# Patient Record
Sex: FEMALE | Race: WHITE | NOT HISPANIC OR LATINO | Employment: OTHER | URBAN - METROPOLITAN AREA
[De-identification: names, ages, dates, MRNs, and addresses within clinical notes are randomized per-mention and may not be internally consistent; named-entity substitution may affect disease eponyms.]

---

## 2017-01-04 ENCOUNTER — TELEPHONE (OUTPATIENT)
Dept: ENDOCRINOLOGY | Facility: CLINIC | Age: 67
End: 2017-01-04

## 2017-01-04 NOTE — TELEPHONE ENCOUNTER
Pt called re: Victoza. Would like confirmation on injection location. Can be reached at [phone]. Detailed VM fine. Message sent to endo pool.

## 2017-01-06 ENCOUNTER — TELEPHONE (OUTPATIENT)
Dept: FAMILY MEDICINE | Facility: CLINIC | Age: 67
End: 2017-01-06

## 2017-01-06 ENCOUNTER — TELEPHONE (OUTPATIENT)
Dept: ENDOCRINOLOGY | Facility: CLINIC | Age: 67
End: 2017-01-06

## 2017-01-06 NOTE — TELEPHONE ENCOUNTER
Endocrine pt of Doron Holman last seen 12/08/16. Pt is calling with concerns about Victoza. Pt has been experiencing mild abdominal discomfort and headache. Please advise pt at [phone] (Detailed VM OK). Message sent to Doron Holman's team.

## 2017-01-06 NOTE — TELEPHONE ENCOUNTER
Telephone call to endocrinology clinic triage nurses. Spoke with Tamir. He provided routing information to triage nurses with the endocrinology clinic.     Message was routed to endocrinology clinic as high priority.       Carla Field RN  [facility]

## 2017-01-06 NOTE — TELEPHONE ENCOUNTER
Called patient and informed her message was routed to her endocrinology clinic. Referred patient to call the endo triage nurses and number was provided. Asked patient to call us back in clinic if she was having troubling connecting with an endo triage nurse. Patient states her understanding.    Carla Field RN  [facility]

## 2017-01-18 DIAGNOSIS — N39.41 URGE INCONTINENCE OF URINE: Primary | ICD-10-CM

## 2017-01-18 RX ORDER — TOLTERODINE 2 MG/1
2 CAPSULE, EXTENDED RELEASE ORAL DAILY
Qty: 30 CAPSULE | Refills: 1 | Status: SHIPPED | OUTPATIENT
Start: 2017-01-18 | End: 2017-03-24

## 2017-01-18 NOTE — TELEPHONE ENCOUNTER
Dr. Jackson -- pt requesting medication marked as discontinued. Please review and advise. Thank you    GENET StewartN, RN  [facility]

## 2017-01-18 NOTE — TELEPHONE ENCOUNTER
Tolterodine      Last Written Prescription Date:  11/8/16  Last Fill Quantity: 30,   # refills: 1  Last Office Visit with G, P or [facility] prescribing provider: 11/21/16  Future Office visit:    Next 5 appointments (look out 90 days)     Feb 16, 2017 11:30 AM   Office Visit with Noam Jackson [place]   [facility]    [address] [place] [id number] [phone]                   Routing refill request to provider for review/approval because:  Drug not active on patient's medication list-pt   Medication Discontinued- Start date 11/8/16-End date 11/14/16

## 2017-02-14 ENCOUNTER — OFFICE VISIT (OUTPATIENT)
Dept: FAMILY MEDICINE | Facility: CLINIC | Age: 67
End: 2017-02-14
Payer: MEDICARE

## 2017-02-14 VITALS
WEIGHT: 228 LBS | TEMPERATURE: 97.3 F | DIASTOLIC BLOOD PRESSURE: 80 MMHG | SYSTOLIC BLOOD PRESSURE: 137 MMHG | HEIGHT: 66 IN | BODY MASS INDEX: 36.64 KG/M2 | OXYGEN SATURATION: 97 % | HEART RATE: 67 BPM

## 2017-02-14 DIAGNOSIS — E78.5 HYPERLIPIDEMIA LDL GOAL <100: ICD-10-CM

## 2017-02-14 DIAGNOSIS — E11.22 TYPE 2 DIABETES MELLITUS WITH STAGE 3 CHRONIC KIDNEY DISEASE, WITHOUT LONG-TERM CURRENT USE OF INSULIN (H): Primary | ICD-10-CM

## 2017-02-14 DIAGNOSIS — R80.9 PROTEINURIA: ICD-10-CM

## 2017-02-14 DIAGNOSIS — I10 ESSENTIAL HYPERTENSION WITH GOAL BLOOD PRESSURE LESS THAN 140/90: ICD-10-CM

## 2017-02-14 DIAGNOSIS — I89.0 LYMPHEDEMA OF BOTH LOWER EXTREMITIES: ICD-10-CM

## 2017-02-14 DIAGNOSIS — E11.40 TYPE 2 DIABETES MELLITUS WITH DIABETIC NEUROPATHY, WITH LONG-TERM CURRENT USE OF INSULIN (H): ICD-10-CM

## 2017-02-14 DIAGNOSIS — N18.30 CKD (CHRONIC KIDNEY DISEASE) STAGE 3, GFR 30-59 ML/MIN (H): ICD-10-CM

## 2017-02-14 DIAGNOSIS — Z12.11 SCREEN FOR COLON CANCER: ICD-10-CM

## 2017-02-14 DIAGNOSIS — N18.30 TYPE 2 DIABETES MELLITUS WITH STAGE 3 CHRONIC KIDNEY DISEASE, WITHOUT LONG-TERM CURRENT USE OF INSULIN (H): Primary | ICD-10-CM

## 2017-02-14 DIAGNOSIS — Z79.4 TYPE 2 DIABETES MELLITUS WITH DIABETIC NEUROPATHY, WITH LONG-TERM CURRENT USE OF INSULIN (H): ICD-10-CM

## 2017-02-14 LAB
CREAT UR-MCNC: 30 MG/DL
HBA1C MFR BLD: 6.9 % (ref 4.3–6)
HGB BLD-MCNC: 9.7 G/DL (ref 11.7–15.7)
PROT UR-MCNC: 3.27 G/L
PROT/CREAT 24H UR: 10.72 G/G CR (ref 0–0.2)

## 2017-02-14 PROCEDURE — 36415 COLL VENOUS BLD VENIPUNCTURE: CPT | Performed by: FAMILY MEDICINE

## 2017-02-14 PROCEDURE — 83036 HEMOGLOBIN GLYCOSYLATED A1C: CPT | Performed by: FAMILY MEDICINE

## 2017-02-14 PROCEDURE — 85018 HEMOGLOBIN: CPT | Performed by: INTERNAL MEDICINE

## 2017-02-14 PROCEDURE — 99214 OFFICE O/P EST MOD 30 MIN: CPT | Performed by: FAMILY MEDICINE

## 2017-02-14 PROCEDURE — 80069 RENAL FUNCTION PANEL: CPT | Performed by: INTERNAL MEDICINE

## 2017-02-14 PROCEDURE — 84156 ASSAY OF PROTEIN URINE: CPT | Performed by: INTERNAL MEDICINE

## 2017-02-14 RX ORDER — AMLODIPINE BESYLATE 10 MG/1
10 TABLET ORAL DAILY
Qty: 90 TABLET | Refills: 3 | Status: SHIPPED | OUTPATIENT
Start: 2017-02-14 | End: 2018-01-09

## 2017-02-14 RX ORDER — DULOXETIN HYDROCHLORIDE 20 MG/1
20 CAPSULE, DELAYED RELEASE ORAL 2 TIMES DAILY
Qty: 60 CAPSULE | Refills: 1 | Status: SHIPPED | OUTPATIENT
Start: 2017-02-14 | End: 2017-03-21

## 2017-02-14 RX ORDER — FUROSEMIDE 80 MG
80 TABLET ORAL DAILY
Qty: 90 TABLET | Refills: 3 | Status: SHIPPED | OUTPATIENT
Start: 2017-02-14 | End: 2017-03-22

## 2017-02-14 RX ORDER — CARVEDILOL 25 MG/1
25 TABLET ORAL 2 TIMES DAILY WITH MEALS
Qty: 180 TABLET | Refills: 3 | Status: SHIPPED | OUTPATIENT
Start: 2017-02-14 | End: 2018-02-21

## 2017-02-14 RX ORDER — ATORVASTATIN CALCIUM 20 MG/1
20 TABLET, FILM COATED ORAL DAILY
Qty: 90 TABLET | Refills: 3 | Status: SHIPPED | OUTPATIENT
Start: 2017-02-14 | End: 2018-05-18

## 2017-02-14 RX ORDER — LISINOPRIL 40 MG/1
40 TABLET ORAL 2 TIMES DAILY
Qty: 180 TABLET | Refills: 3 | Status: ON HOLD | OUTPATIENT
Start: 2017-02-14 | End: 2017-11-13

## 2017-02-14 NOTE — MR AVS SNAPSHOT
After Visit Summary   2/14/2017    Supriya Herr    MRN: [id number]           Patient Information     Date Of Birth          1950        Visit Information        Provider Department      2/14/2017 1:45 PM Noam Jackson MD [facility]        Today's Diagnoses     Type 2 diabetes mellitus with stage 3 chronic kidney disease, without long-term current use of insulin (H)    -  1    Screen for colon cancer        Hyperlipidemia LDL goal <100        Essential hypertension with goal blood pressure less than 140/90        Proteinuria        Lymphedema of both lower extremities        Type 2 diabetes mellitus with diabetic neuropathy, with long-term current use of insulin (H)        CKD (chronic kidney disease) stage 3, GFR 30-59 ml/min           Follow-ups after your visit        Your next 10 appointments already scheduled     Mar 22, 2017  8:30 AM CDT   Lab with  LAB   [facility] ([facility])    [address] [place] [id number]   [phone]            Mar 22, 2017  9:30 AM CDT   (Arrive by 9:00 AM)   Return Visit with Kathryn Basilio MD   [facility] ([facility])    [address] [place] [id number] [phone]            Apr 20, 2017 11:30 AM CDT   (Arrive by 11:00 AM)   RETURN DIABETES with Doron Holman MD   [facility] ([facility])    [address] [place] [id number] [phone]              Future tests that were ordered for you today     Open Future Orders        Priority Expected Expires Ordered    Fecal colorectal cancer screen FIT - Future (S+30) Routine 3/7/2017 3/16/2017 2/14/2017            Who to contact     If you have questions or need follow up information about today's clinic visit or your schedule please contact [facility] directly at  "[phone].  Normal or non-critical lab and imaging results will be communicated to you by MyChart, letter or phone within 4 business days after the clinic has received the results. If you do not hear from us within 7 days, please contact the clinic through [email or web address] or phone. If you have a critical or abnormal lab result, we will notify you by phone as soon as possible.  Submit refill requests through [employer] or call your pharmacy and they will forward the refill request to us. Please allow 3 business days for your refill to be completed.          Additional Information About Your Visit        [email or web address] Information     HowGood lets you send messages to your doctor, view your test results, renew your prescriptions, schedule appointments and more. To sign up, go to [email or web address] . Click on \"Log in\" on the left side of the screen, which will take you to the Welcome page. Then click on \"Sign up Now\" on the right side of the page.     You will be asked to enter the access code listed below, as well as some personal information. Please follow the directions to create your username and password.     Your access code is: [email or web address]  Expires: 5/15/2017  3:27 PM     Your access code will  in 90 days. If you need help or a new code, please call your [facility] clinic or [phone].        Care EveryWhere ID     This is your Care EveryWhere ID. This could be used by other organizations to access your [facility] medical records  [id number]        Your Vitals Were     Pulse Temperature Height Pulse Oximetry BMI (Body Mass Index)       67 97.3  F (36.3  C) (Oral) 5' 6\" (1.676 m) 97% 36.8 kg/m2        Blood Pressure from Last 3 Encounters:   17 137/80   16 138/73   16 128/78    Weight from Last 3 Encounters:   17 228 lb (103.4 kg)   16 228 lb (103.4 kg)   16 228 lb 14.4 oz (103.8 kg)              We Performed the Following     Creatinine urine calculation only     Hemoglobin A1c  "    Hemoglobin     Protein  random urine     Renal panel          Today's Medication Changes          These changes are accurate as of: 2/14/17  3:27 PM.  If you have any questions, ask your nurse or doctor.               Start taking these medicines.        Dose/Directions    DULoxetine 20 MG EC capsule   Commonly known as:  CYMBALTA   Used for:  Type 2 diabetes mellitus with diabetic neuropathy, with long-term current use of insulin (H)   Started by:  Noam Jackson MD        Dose:  20 mg   Take 1 capsule (20 mg) by mouth 2 times daily   Quantity:  60 capsule   Refills:  1         These medicines have changed or have updated prescriptions.        Dose/Directions    atorvastatin 20 MG tablet   Commonly known as:  LIPITOR   This may have changed:  additional instructions   Used for:  Hyperlipidemia LDL goal <100   Changed by:  Noam Jackson MD        Dose:  20 mg   Take 1 tablet (20 mg) by mouth daily   Quantity:  90 tablet   Refills:  3            Where to get your medicines      These medications were sent to [facility]/pharmacy #[id number] - [place], [place] - [address]  [address] [address]     Phone:  [phone]     amLODIPine 10 MG tablet    atorvastatin 20 MG tablet    blood glucose monitoring test strip    carvedilol 25 MG tablet    DULoxetine 20 MG EC capsule    furosemide 80 MG tablet    lisinopril 40 MG tablet                Primary Care Provider Office Phone # Fax #    Noam Jackson [phone]       [facility] [address] [facility] [address]  [facility] [address]        Thank you!     Thank you for choosing [facility]  for your [place]. Our goal is always to provide you with excellent care. Hearing back from our patients is one way we can continue to improve our services. Please take a few minutes to complete the written survey that you may receive in the mail after your visit with us. Thank you!             Your  Updated Medication List - Protect others around you: Learn how to safely use, store and throw away your medicines at www.disposemymeds.org.          This list is accurate as of: 2/14/17  3:27 PM.  Always use your most recent med list.                   Brand Name Dispense Instructions for use    albuterol 108 (90 BASE) MCG/ACT Inhaler    PROAIR HFA/PROVENTIL HFA/VENTOLIN HFA    3 Inhaler    Inhale 2 puffs into the lungs every 6 hours as needed for shortness of breath / dyspnea or wheezing       amLODIPine 10 MG tablet    NORVASC    90 tablet    Take 1 tablet (10 mg) by mouth daily       ASPIRIN NOT PRESCRIBED    INTENTIONAL    0 each    1 each continuous prn Antiplatelet medication not prescribed intentionally due to current nosebleeds       atorvastatin 20 MG tablet    LIPITOR    90 tablet    Take 1 tablet (20 mg) by mouth daily       blood glucose monitoring test strip    ONE TOUCH ULTRA    200 strip    Use to test blood sugars 2 times daily or as directed.       carvedilol 25 MG tablet    COREG    180 tablet    Take 1 tablet (25 mg) by mouth 2 times daily (with meals)       DULoxetine 20 MG EC capsule    CYMBALTA    60 capsule    Take 1 capsule (20 mg) by mouth 2 times daily       furosemide 80 MG tablet    LASIX    90 tablet    Take 1 tablet (80 mg) by mouth daily       insulin glargine 100 UNIT/ML injection    LANTUS SOLOSTAR    3 Month    70 units at bedtime       insulin pen needle 31G X 6 MM    ULTICARE MINI    100 each    Use daily or as directed.       liraglutide 18 MG/3ML soln    VICTOZA PEN    9 mL    Take 0.6mg daily for 7 days, then 1.2mg daily for 7 days, then 1.8mg daily thereafter       lisinopril 40 MG tablet    PRINIVIL/ZESTRIL    180 tablet    Take 1 tablet (40 mg) by mouth 2 times daily       loratadine 10 MG tablet    CLARITIN    90 tablet    Take 1 tablet (10 mg) by mouth daily       metFORMIN 500 MG 24 hr tablet    GLUCOPHAGE XR    120 tablet    Take 2 tablets (1,000 mg) by mouth 2 times  daily       MULTIVITAMIN PO      1 tablet by mouth daily       * order for DME     1 Device    Equipment being ordered: Compression stockings, 20-30 MMHG, knee high       * order for DME     2 Device    Equipment being ordered: TEDS stocking  Below the knee 15-20 mg Dispense 2 Use daily       tolterodine 2 MG 24 hr capsule    DETROL LA    30 capsule    Take 1 capsule (2 mg) by mouth daily       * Notice:  This list has 2 medication(s) that are the same as other medications prescribed for you. Read the directions carefully, and ask your doctor or other care provider to review them with you.

## 2017-02-14 NOTE — NURSING NOTE
"Chief Complaint   Patient presents with     Diabetes       Initial /70 (BP Location: Left arm, Patient Position: Chair, Cuff Size: Adult Large)  Pulse 67  Temp 97.3  F (36.3  C) (Oral)  Ht 5' 6\" (1.676 m)  Wt 228 lb (103.4 kg)  SpO2 97%  BMI 36.8 kg/m2 Estimated body mass index is 36.8 kg/(m^2) as calculated from the following:    Height as of this encounter: 5' 6\" (1.676 m).    Weight as of this encounter: 228 lb (103.4 kg).  Medication Reconciliation: complete   Rody Good MA      "

## 2017-02-14 NOTE — LETTER
February 27, 2017       TO: Supriya Herr  [address]  [facility] [address]       Dear Ms. Herr,    We are writing to inform you of your test results. Your protein leaking is improved to 10 grams, which is still quite high but better than last check. Your diabetes control is excellent. Your creatinine is 1.7, estimated kidney function 30%.        Resulted Orders   Hemoglobin   Result Value Ref Range    Hemoglobin 9.7 (L) 11.7 - 15.7 g/dL   Renal panel   Result Value Ref Range    Sodium 145 (H) 133 - 144 mmol/L    Potassium 4.3 3.4 - 5.3 mmol/L    Chloride 110 (H) 94 - 109 mmol/L    Carbon Dioxide 28 20 - 32 mmol/L    Anion Gap 7 3 - 14 mmol/L    Glucose 79 70 - 99 mg/dL    Urea Nitrogen 30 7 - 30 mg/dL    Creatinine 1.73 (H) 0.52 - 1.04 mg/dL    GFR Estimate 29 (L) >60 mL/min/1.7m2      Comment:      Non  GFR Calc    GFR Estimate If Black 35 (L) >60 mL/min/1.7m2      Comment:       GFR Calc    Calcium 8.6 8.5 - 10.1 mg/dL    Phosphorus 3.7 2.5 - 4.5 mg/dL    Albumin 2.6 (L) 3.4 - 5.0 g/dL   Protein  random urine   Result Value Ref Range    Protein Random Urine 3.27 g/L    Protein Total Urine g/gr Creatinine 10.72 (H) 0 - 0.2 g/g Cr   Creatinine urine calculation only   Result Value Ref Range    Creatinine Urine 30 mg/dL       It was a pleasure to see you at your recent visit. Please contact us at [phone] if you have any questions or concerns. Thank you, we look forward to seeing you at your next visit.       Sincerely,    Kathryn Basilio MD.   of Medicine  Division of Kidney Disease and Hypertension  [facility]  Nephrology Clinic   Third Floor  [address] [place] [address]

## 2017-02-14 NOTE — PROGRESS NOTES
SUBJECTIVE:                                                    Supriya Herr is a 67 year old female who presents to clinic today for the following health issues:      Diabetes Follow-up    Patient is checking blood sugars: 4 times daily.  198 after eating sometimes, varies     Blood sugar testing frequency justification: Uncontrolled diabetes  Results are as follows:             Diabetic concerns: blood sugar frequently over 200     Symptoms of hypoglycemia (low blood sugar): shaky     Paresthesias (numbness or burning in feet) or sores: Yes hands      Date of last diabetic eye exam: last year       Amount of exercise or physical activity:3    Problems taking medications regularly: No    Medication side effects: none  Diet: low salt and carbohydrate counting    Hyperlipidemia Follow-Up      Rate your low fat/cholesterol diet?: good    Taking statin?  Yes, no muscle aches from statin    Other lipid medications/supplements?:  none     Hypertension Follow-up      Outpatient blood pressures are not being checked.    Low Salt Diet: no added salt     Heart Failure Follow-up    Symptoms:    Shortness of breath: none    Lower extremity edema: none    Chest pain: No    Using more pillows than normal: No    Cough at night: No    Weight:    Checking weight daily: No    Weight change: none    Cardiology visits, ER/UC, or hospital admissions since last visit: None    Medication side effects: none       Chronic Kidney Disease Follow-up      Current NSAID use?  No      Problem list and histories reviewed & adjusted, as indicated.  Additional history: as documented    Problem list, Medication list, Allergies, and Medical/Social/Surgical histories reviewed in EPIC and updated as appropriate.    ROS:  Constitutional, HEENT, cardiovascular, pulmonary, gi and gu systems are negative, except as otherwise noted.    OBJECTIVE:                                                    /80  Pulse 67  Temp 97.3  F (36.3  C)  "(Oral)  Ht 5' 6\" (1.676 m)  Wt 228 lb (103.4 kg)  SpO2 97%  BMI 36.8 kg/m2  Body mass index is 36.8 kg/(m^2).  GENERAL: alert, obese, elderly and fatigued  NECK: no adenopathy, no asymmetry, masses, or scars and thyroid normal to palpation  RESP: lungs clear to auscultation - no rales, rhonchi or wheezes  CV: regular rates and rhythm, normal S1 S2, no S3 or S4 and no peripheral edema  ABDOMEN: soft, nontender, no hepatosplenomegaly, no masses and bowel sounds normal  MS: s/p amputaion left BTK  SKIN: no suspicious lesions or rashes  NEURO: Normal strength and tone, mentation intact and speech normal  PSYCH: mentation appears normal, affect normal/bright    Diagnostic Test Results:  Results for orders placed or performed in visit on 02/14/17   Hemoglobin A1c   Result Value Ref Range    Hemoglobin A1C 6.9 (H) 4.3 - 6.0 %   Hemoglobin   Result Value Ref Range    Hemoglobin 9.7 (L) 11.7 - 15.7 g/dL        ASSESSMENT/PLAN:                                                            1. Type 2 diabetes mellitus with stage 3 chronic kidney disease, without long-term current use of insulin (H)  Improved control  - Hemoglobin A1c; Future  - Hemoglobin A1c  - blood glucose monitoring (ONE TOUCH ULTRA) test strip; Use to test blood sugars 2 times daily or as directed.  Dispense: 200 strip; Refill: 3  - Creatinine urine calculation only  Follow up in 3 months.   2. Screen for colon cancer  willdo  - Fecal colorectal cancer screen FIT - Future (S+30); Future    3. Hyperlipidemia LDL goal <100  recheck  - atorvastatin (LIPITOR) 20 MG tablet; Take 1 tablet (20 mg) by mouth daily  Dispense: 90 tablet; Refill: 3    4. Essential hypertension with goal blood pressure less than 140/90  Well controlled   - carvedilol (COREG) 25 MG tablet; Take 1 tablet (25 mg) by mouth 2 times daily (with meals)  Dispense: 180 tablet; Refill: 3  - amLODIPine (NORVASC) 10 MG tablet; Take 1 tablet (10 mg) by mouth daily  Dispense: 90 tablet; Refill: 3    5. " Proteinuria  Recheck on  - lisinopril (PRINIVIL/ZESTRIL) 40 MG tablet; Take 1 tablet (40 mg) by mouth 2 times daily  Dispense: 180 tablet; Refill: 3    6. Lymphedema of both lower extremities  Well controlled   - furosemide (LASIX) 80 MG tablet; Take 1 tablet (80 mg) by mouth daily  Dispense: 90 tablet; Refill: 3    7. Type 2 diabetes mellitus with diabetic neuropathy, with long-term current use of insulin (H)    - DULoxetine (CYMBALTA) 20 MG EC capsule; Take 1 capsule (20 mg) by mouth 2 times daily  Dispense: 60 capsule; Refill: 1    8. CKD (chronic kidney disease) stage 3, GFR 30-59 ml/min  recheck  - Hemoglobin  - Renal panel  - Protein  random urine    Work on weight loss  See Patient Instructions    Noam Jackson MD  [facility]

## 2017-02-15 LAB
ALBUMIN SERPL-MCNC: 2.6 G/DL (ref 3.4–5)
ANION GAP SERPL CALCULATED.3IONS-SCNC: 7 MMOL/L (ref 3–14)
BUN SERPL-MCNC: 30 MG/DL (ref 7–30)
CALCIUM SERPL-MCNC: 8.6 MG/DL (ref 8.5–10.1)
CHLORIDE SERPL-SCNC: 110 MMOL/L (ref 94–109)
CO2 SERPL-SCNC: 28 MMOL/L (ref 20–32)
CREAT SERPL-MCNC: 1.73 MG/DL (ref 0.52–1.04)
GFR SERPL CREATININE-BSD FRML MDRD: 29 ML/MIN/1.7M2
GLUCOSE SERPL-MCNC: 79 MG/DL (ref 70–99)
PHOSPHATE SERPL-MCNC: 3.7 MG/DL (ref 2.5–4.5)
POTASSIUM SERPL-SCNC: 4.3 MMOL/L (ref 3.4–5.3)
SODIUM SERPL-SCNC: 145 MMOL/L (ref 133–144)

## 2017-02-22 ENCOUNTER — TRANSFERRED RECORDS (OUTPATIENT)
Dept: HEALTH INFORMATION MANAGEMENT | Facility: CLINIC | Age: 67
End: 2017-02-22

## 2017-02-24 ENCOUNTER — TELEPHONE (OUTPATIENT)
Dept: FAMILY MEDICINE | Facility: CLINIC | Age: 67
End: 2017-02-24

## 2017-02-24 NOTE — TELEPHONE ENCOUNTER
Spoke with pt, she started the new med cymbalta 2 days ago for her diabetic neuropathy and has had some headache and nausea since starting med. She takes the dose 12 hours apart and takes with food. homecare instruction given treat h/a with OTC pain med. She stated neither symptom is horrible. She will continue with med as she has noted her neuropathy symptoms are better since starting the med. She will call if any questions/concerns    Viridiana Sprague RN

## 2017-02-24 NOTE — TELEPHONE ENCOUNTER
Reason for call: Other   Patient called regarding (reason for call): prescription  Additional comments: Pt stated that since she began taking Cymbalta she has been feeling nauseas and has been getting headaches. She would like to know if she should discontinue taking the medication or not.    Phone Number Pt can be reached at: Home number on file [phone] (home)  Best Time: Any  Can we leave a detailed message on this number? YES

## 2017-03-08 ENCOUNTER — TELEPHONE (OUTPATIENT)
Dept: NEPHROLOGY | Facility: CLINIC | Age: 67
End: 2017-03-08

## 2017-03-08 NOTE — TELEPHONE ENCOUNTER
Hello, this is Chetna's office from Medicine Specialty on the 3rd floor at [facility] located at [address] [place] [address]. We are reminding you of your upcoming Nephrology appointment on 3/22/2017 at 9:30 am. Please arrive an hour prior to your appointment time for labs. Also, please bring an updated medication list including dose of prescribed and over the counter medications you are currently taking or your labeled medication bottles with you to your appointment. If you have any questions or would like to cancel or reschedule your appointment, please call us at [phone].     If you are having labs draw same day you need a lab appointment scheduled 1 hour prior to your visit.    You are welcome to have your labs done 2-7 days before your appointment at any [facility] facility. If you have your labs completed before your appointment, please still come 30 minutes early for check-in.     Thank-You for allowing us to be a member of your Health Care Team,     Celeste Cazares. CMA

## 2017-03-14 DIAGNOSIS — E11.22 TYPE 2 DIABETES MELLITUS WITH STAGE 3 CHRONIC KIDNEY DISEASE, WITHOUT LONG-TERM CURRENT USE OF INSULIN (H): ICD-10-CM

## 2017-03-14 DIAGNOSIS — N18.30 TYPE 2 DIABETES MELLITUS WITH STAGE 3 CHRONIC KIDNEY DISEASE, WITHOUT LONG-TERM CURRENT USE OF INSULIN (H): ICD-10-CM

## 2017-03-14 RX ORDER — METFORMIN HCL 500 MG
1000 TABLET, EXTENDED RELEASE 24 HR ORAL 2 TIMES DAILY
Qty: 120 TABLET | Refills: 2 | Status: SHIPPED | OUTPATIENT
Start: 2017-03-14 | End: 2017-04-20

## 2017-03-14 NOTE — TELEPHONE ENCOUNTER
Metformin  MG Tablet         Last Written Prescription Date: 11/25/16  Last Fill Quantity: 120, # refills: 2  [facility] with [facility] or [facility] prescribing provider:  2/14/17        BP Readings from Last 3 Encounters:   02/14/17 137/80   12/08/16 138/73   11/21/16 128/78     Lab Results   Component Value Date    MICROL 1850 11/21/2016     No results found for: MICROALBUMIN  Creatinine   Date Value Ref Range Status   02/14/2017 1.73 (H) 0.52 - 1.04 mg/dL Final   ]  GFR Estimate   Date Value Ref Range Status   02/14/2017 29 (L) >60 mL/min/1.7m2 Final     Comment:     Non  GFR Calc   10/26/2016 39 (L) >60 mL/min/1.7m2 Final     Comment:     Non  GFR Calc   07/14/2016 23 (L) >60 mL/min/1.7m2 Final     Comment:     Non  GFR Calc     GFR Estimate If Black   Date Value Ref Range Status   02/14/2017 35 (L) >60 mL/min/1.7m2 Final     Comment:      GFR Calc   10/26/2016 47 (L) >60 mL/min/1.7m2 Final     Comment:      GFR Calc   07/14/2016 27 (L) >60 mL/min/1.7m2 Final     Comment:      GFR Calc     Lab Results   Component Value Date    CHOL 284 08/03/2016     Lab Results   Component Value Date    HDL 49 08/03/2016     Lab Results   Component Value Date     08/03/2016    LDL 50 02/13/2014     Lab Results   Component Value Date    TRIG 248 08/03/2016     Lab Results   Component Value Date    CHOLHDLRATIO 4.5 02/20/2015     Lab Results   Component Value Date    AST 9 01/21/2016     Lab Results   Component Value Date    ALT 15 01/21/2016     Lab Results   Component Value Date    A1C 6.9 02/14/2017    A1C 8.6 11/21/2016    A1C 11.1 08/25/2016    A1C 9.7 01/21/2016    A1C 10.0 08/13/2015     Potassium   Date Value Ref Range Status   02/14/2017 4.3 3.4 - 5.3 mmol/L Final

## 2017-03-14 NOTE — TELEPHONE ENCOUNTER
Prescription approved per [facility] Refill Protocol.    Viridiana Sprague RN

## 2017-03-20 DIAGNOSIS — N18.30 CKD (CHRONIC KIDNEY DISEASE) STAGE 3, GFR 30-59 ML/MIN (H): Primary | ICD-10-CM

## 2017-03-20 NOTE — NURSING NOTE
Labs per clinic 2A protocol.  Follow up/CKD 3  Last OV: 10/26/16  Lu Goodwin LPN  Nephrology  [facility]  [phone]

## 2017-03-22 ENCOUNTER — OFFICE VISIT (OUTPATIENT)
Dept: NEPHROLOGY | Facility: CLINIC | Age: 67
End: 2017-03-22
Attending: INTERNAL MEDICINE
Payer: MEDICARE

## 2017-03-22 VITALS
HEART RATE: 72 BPM | OXYGEN SATURATION: 99 % | DIASTOLIC BLOOD PRESSURE: 76 MMHG | BODY MASS INDEX: 36 KG/M2 | SYSTOLIC BLOOD PRESSURE: 159 MMHG | WEIGHT: 224 LBS | TEMPERATURE: 98 F | HEIGHT: 66 IN

## 2017-03-22 DIAGNOSIS — N18.30 CKD (CHRONIC KIDNEY DISEASE) STAGE 3, GFR 30-59 ML/MIN (H): Primary | ICD-10-CM

## 2017-03-22 DIAGNOSIS — I89.0 LYMPHEDEMA OF BOTH LOWER EXTREMITIES: ICD-10-CM

## 2017-03-22 DIAGNOSIS — N18.30 CKD (CHRONIC KIDNEY DISEASE) STAGE 3, GFR 30-59 ML/MIN (H): ICD-10-CM

## 2017-03-22 LAB
ALBUMIN SERPL-MCNC: 2.6 G/DL (ref 3.4–5)
ANION GAP SERPL CALCULATED.3IONS-SCNC: 5 MMOL/L (ref 3–14)
BUN SERPL-MCNC: 35 MG/DL (ref 7–30)
CALCIUM SERPL-MCNC: 9 MG/DL (ref 8.5–10.1)
CHLORIDE SERPL-SCNC: 108 MMOL/L (ref 94–109)
CK SERPL-CCNC: 45 U/L (ref 30–225)
CO2 SERPL-SCNC: 31 MMOL/L (ref 20–32)
CREAT SERPL-MCNC: 1.81 MG/DL (ref 0.52–1.04)
CREAT UR-MCNC: 20 MG/DL
GFR SERPL CREATININE-BSD FRML MDRD: 28 ML/MIN/1.7M2
GLUCOSE SERPL-MCNC: 120 MG/DL (ref 70–99)
HGB BLD-MCNC: 10.2 G/DL (ref 11.7–15.7)
PHOSPHATE SERPL-MCNC: 2.9 MG/DL (ref 2.5–4.5)
POTASSIUM SERPL-SCNC: 4.7 MMOL/L (ref 3.4–5.3)
PROT UR-MCNC: 2.66 G/L
PROT/CREAT 24H UR: 13.09 G/G CR (ref 0–0.2)
SODIUM SERPL-SCNC: 144 MMOL/L (ref 133–144)

## 2017-03-22 PROCEDURE — 80069 RENAL FUNCTION PANEL: CPT | Performed by: INTERNAL MEDICINE

## 2017-03-22 PROCEDURE — 85018 HEMOGLOBIN: CPT | Performed by: INTERNAL MEDICINE

## 2017-03-22 PROCEDURE — 36415 COLL VENOUS BLD VENIPUNCTURE: CPT | Performed by: INTERNAL MEDICINE

## 2017-03-22 PROCEDURE — 86335 IMMUNFIX E-PHORSIS/URINE/CSF: CPT | Performed by: INTERNAL MEDICINE

## 2017-03-22 PROCEDURE — 99213 OFFICE O/P EST LOW 20 MIN: CPT | Mod: ZF

## 2017-03-22 PROCEDURE — 82784 ASSAY IGA/IGD/IGG/IGM EACH: CPT | Performed by: INTERNAL MEDICINE

## 2017-03-22 PROCEDURE — 84165 PROTEIN E-PHORESIS SERUM: CPT | Performed by: INTERNAL MEDICINE

## 2017-03-22 PROCEDURE — 00000402 ZZHCL STATISTIC TOTAL PROTEIN: Performed by: INTERNAL MEDICINE

## 2017-03-22 PROCEDURE — 84156 ASSAY OF PROTEIN URINE: CPT | Performed by: INTERNAL MEDICINE

## 2017-03-22 PROCEDURE — 86255 FLUORESCENT ANTIBODY SCREEN: CPT | Performed by: INTERNAL MEDICINE

## 2017-03-22 PROCEDURE — 86334 IMMUNOFIX E-PHORESIS SERUM: CPT | Performed by: INTERNAL MEDICINE

## 2017-03-22 PROCEDURE — 82550 ASSAY OF CK (CPK): CPT | Performed by: INTERNAL MEDICINE

## 2017-03-22 RX ORDER — FUROSEMIDE 80 MG
80 TABLET ORAL 2 TIMES DAILY
Qty: 180 TABLET | Refills: 3 | Status: SHIPPED | OUTPATIENT
Start: 2017-03-22 | End: 2018-01-11

## 2017-03-22 ASSESSMENT — PAIN SCALES - GENERAL: PAINLEVEL: NO PAIN (0)
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
## 2017-03-22 NOTE — MR AVS SNAPSHOT
"              After Visit Summary   3/22/2017    Supriya Herr    MRN: [id number]           Patient Information     Date Of Birth          1950        Visit Information        Provider Department      3/22/2017 9:30 AM Kathryn Basilio MD [facility]        Today's Diagnoses     Lymphedema of both lower extremities           Follow-ups after your visit        Follow-up notes from your care team     Return in about 3 months (around 6/22/2017).      Your next 10 appointments already scheduled     Apr 20, 2017 11:30 AM CDT   (Arrive by 11:00 AM)   RETURN DIABETES with Doron Holman MD   [facility] ([facility])    [address] [place] [address]  3rd [facility] [address] [phone]              Who to contact     If you have questions or need follow up information about today's clinic visit or your schedule please contact [facility] directly at [phone].  Normal or non-critical lab and imaging results will be communicated to you by [employer], letter or phone within 4 business days after the clinic has received the results. If you do not hear from us within 7 days, please contact the clinic through [employer] or phone. If you have a critical or abnormal lab result, we will notify you by phone as soon as possible.  Submit refill requests through [employer] or call your pharmacy and they will forward the refill request to us. Please allow 3 business days for your refill to be completed.          Additional Information About Your Visit        [employer] Information     [employer] lets you send messages to your doctor, view your test results, renew your prescriptions, schedule appointments and more. To sign up, go to [email or web address] . Click on \"Log in\" on the left side of the screen, which will take you to the Welcome page. Then click on \"Sign up Now\" on the right side of the page.     You will be asked to enter the access code listed below, as well " "as some personal information. Please follow the directions to create your username and password.     Your access code is: [email or web address]  Expires: 5/15/2017  4:27 PM     Your access code will  in 90 days. If you need help or a new code, please call your [place] clinic or [phone].        Care EveryWhere ID     This is your Care EveryWhere ID. This could be used by other organizations to access your [place] medical records  [id number]        Your Vitals Were     Pulse Temperature Height Pulse Oximetry BMI (Body Mass Index)       72 98  F (36.7  C) (Oral) 1.676 m (5' 6\") 99% 36.15 kg/m2        Blood Pressure from Last 3 Encounters:   No data found for BP    Weight from Last 3 Encounters:   No data found for Wt              Today, you had the following     No orders found for display         Today's Medication Changes          These changes are accurate as of: 3/22/17 11:59 PM.  If you have any questions, ask your nurse or doctor.               These medicines have changed or have updated prescriptions.        Dose/Directions    furosemide 80 MG tablet   Commonly known as:  LASIX   This may have changed:    - when to take this  - additional instructions   Used for:  Lymphedema of both lower extremities   Changed by:  Kathryn Basilio MD        Dose:  80 mg   Take 1 tablet (80 mg) by mouth 2 times daily Patient taking 80mg in AM, 40mg in PM   Quantity:  180 tablet   Refills:  3         Stop taking these medicines if you haven't already. Please contact your care team if you have questions.     liraglutide 18 MG/3ML soln   Commonly known as:  VICTOZA PEN   Stopped by:  Kathryn Basilio MD                Where to get your medicines      These medications were sent to [facility]/pharmacy #[id number] - [place], [place] - [address]  [address] [address]     Phone:  [phone]     furosemide 80 MG tablet                Primary Care Provider Office Phone # Fax #    Noam " Luis Jackson [phone]       [facility] [address] [facility] [address]  [facility] [address]        Thank you!     Thank you for choosing [facility]  for your care. Our goal is always to provide you with excellent care. Hearing back from our patients is one way we can continue to improve our services. Please take a few minutes to complete the written survey that you may receive in the mail after your visit with us. Thank you!             Your Updated Medication List - Protect others around you: Learn how to safely use, store and throw away your medicines at www.disposemymeds.org.          This list is accurate as of: 3/22/17 11:59 PM.  Always use your most recent med list.                   Brand Name Dispense Instructions for use    albuterol 108 (90 BASE) MCG/ACT Inhaler    PROAIR HFA/PROVENTIL HFA/VENTOLIN HFA    3 Inhaler    Inhale 2 puffs into the lungs every 6 hours as needed for shortness of breath / dyspnea or wheezing       amLODIPine 10 MG tablet    NORVASC    90 tablet    Take 1 tablet (10 mg) by mouth daily       ASPIRIN NOT PRESCRIBED    INTENTIONAL    0 each    1 each continuous prn Antiplatelet medication not prescribed intentionally due to current nosebleeds       atorvastatin 20 MG tablet    LIPITOR    90 tablet    Take 1 tablet (20 mg) by mouth daily       blood glucose monitoring test strip    ONE TOUCH ULTRA    200 strip    Use to test blood sugars 2 times daily or as directed.       carvedilol 25 MG tablet    COREG    180 tablet    Take 1 tablet (25 mg) by mouth 2 times daily (with meals)       DULoxetine 20 MG EC capsule    CYMBALTA    60 capsule    Take 1 capsule (20 mg) by mouth 2 times daily       furosemide 80 MG tablet    LASIX    180 tablet    Take 1 tablet (80 mg) by mouth 2 times daily Patient taking 80mg in AM, 40mg in PM       insulin glargine 100 UNIT/ML injection    LANTUS SOLOSTAR    3 Month    70 units at bedtime       insulin pen needle 31G X 6 MM     ULTICARE MINI    100 each    Use daily or as directed.       lisinopril 40 MG tablet    PRINIVIL/ZESTRIL    180 tablet    Take 1 tablet (40 mg) by mouth 2 times daily       loratadine 10 MG tablet    CLARITIN    90 tablet    Take 1 tablet (10 mg) by mouth daily       metFORMIN 500 MG 24 hr tablet    GLUCOPHAGE XR    120 tablet    Take 2 tablets (1,000 mg) by mouth 2 times daily       MULTIVITAMIN PO      1 tablet by mouth daily       * order for DME     1 Device    Equipment being ordered: Compression stockings, 20-30 MMHG, knee high       * order for DME     2 Device    Equipment being ordered: TEDS stocking  Below the knee 15-20 mg Dispense 2 Use daily       * Notice:  This list has 2 medication(s) that are the same as other medications prescribed for you. Read the directions carefully, and ask your doctor or other care provider to review them with you.
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
## 2017-03-22 NOTE — NURSING NOTE
"Chief Complaint   Patient presents with     RECHECK     Follow up CKD stage 3       Initial /76  Pulse 72  Temp 98  F (36.7  C) (Oral)  Ht 1.676 m (5' 6\")  Wt 101.6 kg (224 lb)  SpO2 99%  BMI 36.15 kg/m2 Estimated body mass index is 36.15 kg/(m^2) as calculated from the following:    Height as of this encounter: 1.676 m (5' 6\").    Weight as of this encounter: 101.6 kg (224 lb).  Medication Reconciliation: complete   Celeste Cazares. CMA  "

## 2017-03-23 LAB
ALBUMIN SERPL ELPH-MCNC: 3.1 G/DL (ref 3.7–5.1)
ALPHA1 GLOB SERPL ELPH-MCNC: 0.4 G/DL (ref 0.2–0.4)
ALPHA2 GLOB SERPL ELPH-MCNC: 1 G/DL (ref 0.5–0.9)
B-GLOBULIN SERPL ELPH-MCNC: 1 G/DL (ref 0.6–1)
GAMMA GLOB SERPL ELPH-MCNC: 1.2 G/DL (ref 0.7–1.6)
IGA SERPL-MCNC: 368 MG/DL (ref 70–380)
IGG SERPL-MCNC: 1090 MG/DL (ref 695–1620)
IGM SERPL-MCNC: 147 MG/DL (ref 60–265)
M PROTEIN SERPL ELPH-MCNC: 0 G/DL
PROT ELPH PNL UR ELPH: NORMAL
PROT PATTERN SERPL ELPH-IMP: ABNORMAL
PROT PATTERN SERPL IFE-IMP: NORMAL

## 2017-03-24 DIAGNOSIS — N39.41 URGE INCONTINENCE OF URINE: ICD-10-CM

## 2017-03-24 LAB — ANCA IGG TITR SER IF: NORMAL {TITER}

## 2017-03-24 NOTE — TELEPHONE ENCOUNTER
Tolterodine Tart ER 2 MG Cap   Last Written Prescription Date: 1/18/17  Last Fill Quantity: 30,  # refills: 1   Last Office Visit with G, [facility] or [facility] prescribing provider: 2/14/17

## 2017-03-27 RX ORDER — TOLTERODINE 2 MG/1
2 CAPSULE, EXTENDED RELEASE ORAL DAILY
Qty: 30 CAPSULE | Refills: 1 | Status: SHIPPED | OUTPATIENT
Start: 2017-03-27 | End: 2017-05-25

## 2017-03-27 NOTE — TELEPHONE ENCOUNTER
Prescription approved per [facility] Refill Protocol.    Viridiana Sprague RN

## 2017-04-20 ENCOUNTER — OFFICE VISIT (OUTPATIENT)
Dept: ENDOCRINOLOGY | Facility: CLINIC | Age: 67
End: 2017-04-20

## 2017-04-20 VITALS — HEART RATE: 70 BPM | DIASTOLIC BLOOD PRESSURE: 68 MMHG | SYSTOLIC BLOOD PRESSURE: 138 MMHG

## 2017-04-20 DIAGNOSIS — Z79.4 TYPE 2 DIABETES MELLITUS WITH STAGE 3 CHRONIC KIDNEY DISEASE, WITH LONG-TERM CURRENT USE OF INSULIN (H): Primary | ICD-10-CM

## 2017-04-20 DIAGNOSIS — E11.22 TYPE 2 DIABETES MELLITUS WITH STAGE 3 CHRONIC KIDNEY DISEASE, WITH LONG-TERM CURRENT USE OF INSULIN (H): Primary | ICD-10-CM

## 2017-04-20 DIAGNOSIS — N18.30 TYPE 2 DIABETES MELLITUS WITH STAGE 3 CHRONIC KIDNEY DISEASE, WITH LONG-TERM CURRENT USE OF INSULIN (H): Primary | ICD-10-CM

## 2017-04-20 ASSESSMENT — PAIN SCALES - GENERAL: PAINLEVEL: NO PAIN (0)

## 2017-04-20 NOTE — PATIENT INSTRUCTIONS
Stop Metformin    Check your blood sugar at least twice a day    Continue taking Lantus 50 units before bed    See us back in clinic in 2 months with your meter.  We will check your A1c at that time, and if needed start a new medication to replace metformin.        To expedite your medication refill(s), please contact your pharmacy and have them fax a refill request to: [phone].  *Please allow 3 business days for routine medication refills.  *Please allow 5 business days for controlled substance medication refills.  --------------------  For scheduling appointments (including lab work), please request an appointment through [employer], or call: [phone].    For questions for your provider or the endocrine nurse, please send a [employer] message.  For after-hours urgent issues, please dial [phone], and ask to speak with the Endocrinologist On-Call.  --------------------  Please Note: If you are active on [employer], all future test results will be sent by [employer] message only and will no longer be sent by mail. You may also receive communication directly from your physician.

## 2017-04-20 NOTE — MR AVS SNAPSHOT
After Visit Summary   4/20/2017    Supriya Herr    MRN: [id number]           Patient Information     Date Of Birth          1950        Visit Information        Provider Department      4/20/2017 11:30 AM Doron Holman MD [facility] Endocrinology        Care Instructions    Stop Metformin    Check your blood sugar at least twice a day    Continue taking Lantus 50 units before bed    See us back in clinic in 2 months with your meter.  We will check your A1c at that time, and if needed start a new medication to replace metformin.        To expedite your medication refill(s), please contact your pharmacy and have them fax a refill request to: [phone].  *Please allow 3 business days for routine medication refills.  *Please allow 5 business days for controlled substance medication refills.  --------------------  For scheduling appointments (including lab work), please request an appointment through [employer], or call: [phone].    For questions for your provider or the endocrine nurse, please send a [employer] message.  For after-hours urgent issues, please dial [phone], and ask to speak with the Endocrinologist On-Call.  --------------------  Please Note: If you are active on [employer], all future test results will be sent by [employer] message only and will no longer be sent by mail. You may also receive communication directly from your physician.          Follow-ups after your visit        Follow-up notes from your care team     Return in about 2 months (around 6/20/2017).      Your next 10 appointments already scheduled     Jul 19, 2017 12:00 PM CDT   (Arrive by 11:45 AM)   RETURN DIABETES with [facility] ([facility])    [address] [place] [address] [phone]              Who to contact     Please call your clinic at [phone] to:    Ask questions about your health    Make or cancel  appointments    Discuss your medicines    Learn about your test results    Speak to your doctor   If you have compliments or concerns about an experience at your clinic, or if you wish to file a complaint, please contact [facility] Patient Relations at [phone] or email us at [email or web address]         Additional Information About Your Visit        [employer] Information     [employer] is an electronic gateway that provides easy, online access to your medical records. With [employer], you can request a clinic appointment, read your test results, renew a prescription or communicate with your care team.     To sign up for [employer] visit the website at [email or web address]   You will be asked to enter the access code listed below, as well as some personal information. Please follow the directions to create your username and password.     Your access code is: [email or web address]  Expires: 5/15/2017  4:27 PM     Your access code will  in 90 days. If you need help or a new code, please contact your [facility] or call [phone] for assistance.        Care EveryWhere ID     This is your Care EveryWhere ID. This could be used by other organizations to access your [place] medical records  [id number]        Your Vitals Were     Pulse                   70            Blood Pressure from Last 3 Encounters:   17 138/68   17 159/76   17 137/80    Weight from Last 3 Encounters:   17 101.6 kg (224 lb)   17 103.4 kg (228 lb)   16 103.4 kg (228 lb)              Today, you had the following     No orders found for display         Today's Medication Changes          These changes are accurate as of: 17 11:36 AM.  If you have any questions, ask your nurse or doctor.               These medicines have changed or have updated prescriptions.        Dose/Directions    insulin glargine 100 UNIT/ML injection   Commonly known as:   PAN NAJERA   This may have changed:  additional instructions   Used for:  Type 2 diabetes mellitus with diabetic nephropathy (H)        70 units at bedtime   Quantity:  3 Month   Refills:  3         Stop taking these medicines if you haven't already. Please contact your care team if you have questions.     DULoxetine 20 MG EC capsule   Commonly known as:  CYMBALTA   Stopped by:  Doron Holman MD           metFORMIN 500 MG 24 hr tablet   Commonly known as:  GLUCOPHAGE XR   Stopped by:  Doron Holman MD                    Primary Care Provider Office Phone # Fax #    Noam Luis Jackson [phone]       [facility] [address] [address]        Thank you!     Thank you for choosing [facility] ENDOCRINOLOGY  for your care. Our goal is always to provide you with excellent care. Hearing back from our patients is one way we can continue to improve our services. Please take a few minutes to complete the written survey that you may receive in the mail after your visit with us. Thank you!             Your Updated Medication List - Protect others around you: Learn how to safely use, store and throw away your medicines at www.disposemymeds.org.          This list is accurate as of: 4/20/17 11:36 AM.  Always use your most recent med list.                   Brand Name Dispense Instructions for use    albuterol 108 (90 BASE) MCG/ACT Inhaler    PROAIR HFA/PROVENTIL HFA/VENTOLIN HFA    3 Inhaler    Inhale 2 puffs into the lungs every 6 hours as needed for shortness of breath / dyspnea or wheezing       amLODIPine 10 MG tablet    NORVASC    90 tablet    Take 1 tablet (10 mg) by mouth daily       ASPIRIN NOT PRESCRIBED    INTENTIONAL    0 each    1 each continuous prn Antiplatelet medication not prescribed intentionally due to current nosebleeds       atorvastatin 20 MG tablet    LIPITOR    90 tablet    Take 1 tablet (20 mg) by mouth daily       blood glucose monitoring  test strip    ONE TOUCH ULTRA    200 strip    Use to test blood sugars 2 times daily or as directed.       carvedilol 25 MG tablet    COREG    180 tablet    Take 1 tablet (25 mg) by mouth 2 times daily (with meals)       furosemide 80 MG tablet    LASIX    180 tablet    Take 1 tablet (80 mg) by mouth 2 times daily Patient taking 80mg in AM, 40mg in PM       insulin glargine 100 UNIT/ML injection    LANTUS SOLOSTAR    3 Month    70 units at bedtime       insulin pen needle 31G X 6 MM    ULTICARE MINI    100 each    Use daily or as directed.       lisinopril 40 MG tablet    PRINIVIL/ZESTRIL    180 tablet    Take 1 tablet (40 mg) by mouth 2 times daily       loratadine 10 MG tablet    CLARITIN    90 tablet    Take 1 tablet (10 mg) by mouth daily       MULTIVITAMIN PO      1 tablet by mouth daily       * order for DME     1 Device    Equipment being ordered: Compression stockings, 20-30 MMHG, knee high       * order for DME     2 Device    Equipment being ordered: TEDS stocking  Below the knee 15-20 mg Dispense 2 Use daily       tolterodine 2 MG 24 hr capsule    DETROL LA    30 capsule    Take 1 capsule (2 mg) by mouth daily       * Notice:  This list has 2 medication(s) that are the same as other medications prescribed for you. Read the directions carefully, and ask your doctor or other care provider to review them with you.
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
## 2017-04-20 NOTE — NURSING NOTE
Chief Complaint   Patient presents with     RECHECK     F/U TYPE II DM     Kelly Decker, [facility]  Endocrinology & Diabetes 3G
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
## 2017-05-08 ENCOUNTER — TELEPHONE (OUTPATIENT)
Dept: ENDOCRINOLOGY | Facility: CLINIC | Age: 67
End: 2017-05-08

## 2017-05-08 NOTE — TELEPHONE ENCOUNTER
Pt called re: BG numbers. Reporting since metformin D/C'd, several pre-meal AM BG in 200s, 2 pre-meal midday BG in 400s. Reports blurred vision, headache w/ one of the latter. Asymptomatic time of call. Asking if 1 metformin BID would help. Pt taking 50 U Lantus in PM. Please advise at [phone]. DVM fine. Sent to [place].

## 2017-05-09 ENCOUNTER — TELEPHONE (OUTPATIENT)
Dept: FAMILY MEDICINE | Facility: CLINIC | Age: 67
End: 2017-05-09

## 2017-05-09 NOTE — TELEPHONE ENCOUNTER
Reason for Call: Request for an order or referral:    Order or referral being requested: Supriya called and needs a couple of things. First of all she needs a prescription for a new wheelchair and then she saw her endo person who took her off of her metformin and was going to prescribed something else and she has not heard back from their office and wondering what she should do    Date needed: as soon as possible    Has the patient been seen by the PCP for this problem? YES    Additional comments:     Phone number Patient can be reached at:  [phone]    Best Time:  anytime    Can we leave a detailed message on this number?  Not Applicable    Call taken on 5/9/2017 at 11:04 AM by Radha Landeros

## 2017-05-09 NOTE — TELEPHONE ENCOUNTER
Telephone call to patient. Informed her to contact Endocrinology regarding her metformin. She states her understanding. She will schedule a follow up clinic visit with Dr. Jackson in the next few weeks. She declined to schedule while on the phone this morning.     Carla Field RN  [facility]

## 2017-05-11 NOTE — TELEPHONE ENCOUNTER
Metformin is contraindicated due to her renal function, GFR is below 30.  She should increase her Lantus to 55 units, and if BG's remain elevated after 1 week she should increase to 60 units.  She should call if she develops any low glucoses or if the increased Lantus does not improve her glucoses, and we will consider starting Trajenta or Victoza     Doron      Orders relayed to pt. She is asking to discuss with Dr. Holman, message sent requesting call to pt

## 2017-05-13 ENCOUNTER — TELEPHONE (OUTPATIENT)
Dept: ENDOCRINOLOGY | Facility: CLINIC | Age: 67
End: 2017-05-13

## 2017-05-13 NOTE — TELEPHONE ENCOUNTER
Received message that patient requests phone call to discuss diabetes medications.  Our clinic nurse was able to speak with the patient about the recommendations I had given, which included to NOT resume metformin due to GFR and to increase Lantus.  I attempted to call the patient but there was no answer and her voice mail is not set up. I will try to call again tomorrow    Doron Holman MD  PGY-6, Endocrinology Fellow  Pager [phone]

## 2017-05-17 ENCOUNTER — TELEPHONE (OUTPATIENT)
Dept: ENDOCRINOLOGY | Facility: CLINIC | Age: 67
End: 2017-05-17

## 2017-05-17 NOTE — TELEPHONE ENCOUNTER
Called pt x2 received voicemail, left message to call back re: below message.    ----- Message -----     From: Doron Holman MD     Sent: 5/14/2017   2:13 PM       To: Caridad Prieto, RN  Subject: RE: please call pt                               I've tried calling her multiple times this weekend (i was on call this weekend) with no answer and no voice mail.  Could you try to call her again next week to see what her specific question is?  I will be on vacation next week    Doron    ----- Message -----     From: Caridad Prieto RN     Sent: 5/11/2017  11:08 AM       To: Doron Holman MD  Subject: please call pt                                   These orders relayed to pt but she is asking if you could call to discuss. She states she had been on Lantus 70 units. Fasting am fepxm=143 but reports  a couple days ago.  Tiffanie    Metformin is contraindicated due to her renal function, GFR is below 30.  She should increase her Lantus to 55 units, and if BG's remain elevated after 1 week she should increase to 60 units.  She should call if she develops any low glucoses or if the increased Lantus does not improve her glucoses, and we will consider starting Trajenta or Victoza     Doron

## 2017-05-23 ENCOUNTER — TELEPHONE (OUTPATIENT)
Dept: ENDOCRINOLOGY | Facility: CLINIC | Age: 67
End: 2017-05-23

## 2017-05-23 NOTE — TELEPHONE ENCOUNTER
Message from Dr. Holman read to daughter. Pt is having high glucose readings after not taking metformin. Recent reading was 290 (5/22/17).   Message to Dr. Holman.

## 2017-05-24 ENCOUNTER — TELEPHONE (OUTPATIENT)
Dept: FAMILY MEDICINE | Facility: CLINIC | Age: 67
End: 2017-05-24

## 2017-05-24 NOTE — TELEPHONE ENCOUNTER
Spoke with pt she has had some neuropathy in her hand, she notices it a lot when she is doing fine hand work. She stated she was taking a medication for this, we did a medication review of all her meds, she was instructed to put her current medications in one area and get rid of the medications she doesn't currently take, we reviewed each medication and how she is to take them, she verbalized understanding. She is heading out of town for a week we agreed she will make an appt with Dr. Jackson for as soon as she gets back to address the neuropathy concern, she will cont to keep her BGs controlled with diet and exercise    She requested I call her sister Caroline to schedule the appt as she is the one that takes her to appts. VM left for sister to call and schedule the appt, number given for clinic    Viridiana Sprague RN

## 2017-05-24 NOTE — TELEPHONE ENCOUNTER
Pt would like to speak with someone regarding the neuropathy in her hand. She states that her medication is not working and that her hand keeps going numb    Pt can be reached @ [phone] rodney

## 2017-05-25 DIAGNOSIS — N39.41 URGE INCONTINENCE OF URINE: ICD-10-CM

## 2017-05-25 RX ORDER — TOLTERODINE 2 MG/1
CAPSULE, EXTENDED RELEASE ORAL
Qty: 30 CAPSULE | Refills: 3 | Status: SHIPPED | OUTPATIENT
Start: 2017-05-25 | End: 2017-10-20

## 2017-05-25 NOTE — TELEPHONE ENCOUNTER
TOLTERODINE TART ER 2 MG CAP        Last Written Prescription Date: 3/27/17  Last Fill Quantity: 30,  # refills: 1   Last Office Visit with G, [facility] or [facility] prescribing provider: 11/21/16

## 2017-05-25 NOTE — TELEPHONE ENCOUNTER
Prescription approved per [facility] Refill Protocol.    Carla Field RN  [facility]

## 2017-06-01 ENCOUNTER — TELEPHONE (OUTPATIENT)
Dept: ENDOCRINOLOGY | Facility: CLINIC | Age: 67
End: 2017-06-01

## 2017-06-01 NOTE — TELEPHONE ENCOUNTER
I spoke with pt's daughter today. Pt sporadically checks her blood sugar 2-4 times per day. Pt is following the last message from you.  Pt is taking 60 units of lantus. Lows usually occur in the am with readings from . Blood sugar this am was 133.   Pt's high run from 248-325, usually high when sick.  Message to Dr. Holman.

## 2017-06-09 ENCOUNTER — OFFICE VISIT (OUTPATIENT)
Dept: FAMILY MEDICINE | Facility: CLINIC | Age: 67
End: 2017-06-09
Payer: MEDICARE

## 2017-06-09 VITALS
TEMPERATURE: 98.6 F | DIASTOLIC BLOOD PRESSURE: 62 MMHG | HEART RATE: 75 BPM | BODY MASS INDEX: 35.5 KG/M2 | WEIGHT: 220.9 LBS | HEIGHT: 66 IN | OXYGEN SATURATION: 97 % | SYSTOLIC BLOOD PRESSURE: 120 MMHG

## 2017-06-09 DIAGNOSIS — Z11.59 NEED FOR HEPATITIS C SCREENING TEST: ICD-10-CM

## 2017-06-09 DIAGNOSIS — Z12.11 SCREEN FOR COLON CANCER: ICD-10-CM

## 2017-06-09 DIAGNOSIS — E11.21 TYPE 2 DIABETES MELLITUS WITH DIABETIC NEPHROPATHY, WITH LONG-TERM CURRENT USE OF INSULIN (H): Primary | ICD-10-CM

## 2017-06-09 DIAGNOSIS — E08.42 DIABETIC POLYNEUROPATHY ASSOCIATED WITH DIABETES MELLITUS DUE TO UNDERLYING CONDITION (H): ICD-10-CM

## 2017-06-09 DIAGNOSIS — Z79.4 TYPE 2 DIABETES MELLITUS WITH DIABETIC NEPHROPATHY, WITH LONG-TERM CURRENT USE OF INSULIN (H): Primary | ICD-10-CM

## 2017-06-09 LAB
ALBUMIN SERPL-MCNC: 2.5 G/DL (ref 3.4–5)
ALP SERPL-CCNC: 72 U/L (ref 40–150)
ALT SERPL W P-5'-P-CCNC: 26 U/L (ref 0–50)
ANION GAP SERPL CALCULATED.3IONS-SCNC: 9 MMOL/L (ref 3–14)
AST SERPL W P-5'-P-CCNC: 14 U/L (ref 0–45)
BILIRUB SERPL-MCNC: 0.4 MG/DL (ref 0.2–1.3)
BUN SERPL-MCNC: 44 MG/DL (ref 7–30)
CALCIUM SERPL-MCNC: 9 MG/DL (ref 8.5–10.1)
CHLORIDE SERPL-SCNC: 105 MMOL/L (ref 94–109)
CO2 SERPL-SCNC: 29 MMOL/L (ref 20–32)
CREAT SERPL-MCNC: 2.11 MG/DL (ref 0.52–1.04)
GFR SERPL CREATININE-BSD FRML MDRD: 23 ML/MIN/1.7M2
GLUCOSE SERPL-MCNC: 344 MG/DL (ref 70–99)
HBA1C MFR BLD: 9.6 % (ref 4.3–6)
POTASSIUM SERPL-SCNC: 4.9 MMOL/L (ref 3.4–5.3)
PROT SERPL-MCNC: 7.5 G/DL (ref 6.8–8.8)
SODIUM SERPL-SCNC: 143 MMOL/L (ref 133–144)

## 2017-06-09 PROCEDURE — 83036 HEMOGLOBIN GLYCOSYLATED A1C: CPT | Performed by: FAMILY MEDICINE

## 2017-06-09 PROCEDURE — G0472 HEP C SCREEN HIGH RISK/OTHER: HCPCS | Performed by: FAMILY MEDICINE

## 2017-06-09 PROCEDURE — 99214 OFFICE O/P EST MOD 30 MIN: CPT | Performed by: FAMILY MEDICINE

## 2017-06-09 PROCEDURE — 36415 COLL VENOUS BLD VENIPUNCTURE: CPT | Performed by: FAMILY MEDICINE

## 2017-06-09 PROCEDURE — 86803 HEPATITIS C AB TEST: CPT | Performed by: FAMILY MEDICINE

## 2017-06-09 PROCEDURE — 80053 COMPREHEN METABOLIC PANEL: CPT | Performed by: FAMILY MEDICINE

## 2017-06-09 RX ORDER — DULOXETIN HYDROCHLORIDE 30 MG/1
30 CAPSULE, DELAYED RELEASE ORAL 2 TIMES DAILY
Qty: 180 CAPSULE | Refills: 3 | Status: SHIPPED | OUTPATIENT
Start: 2017-06-09 | End: 2018-03-02

## 2017-06-09 NOTE — PROGRESS NOTES
"Chief Complaint   Patient presents with     Diabetes       Initial Pulse 75  Temp 98.6  F (37  C) (Oral)  Ht 5' 6\" (1.676 m)  Wt 220 lb 14.4 oz (100.2 kg)  SpO2 97%  BMI 35.65 kg/m2 Estimated body mass index is 35.65 kg/(m^2) as calculated from the following:    Height as of this encounter: 5' 6\" (1.676 m).    Weight as of this encounter: 220 lb 14.4 oz (100.2 kg).  Medication Reconciliation: complete     Arely Osborn, Student MA       "

## 2017-06-09 NOTE — PROGRESS NOTES
"  SUBJECTIVE:                                                    Supriya Herr is a 67 year old female who presents to clinic today for the following health issues:      Diabetes Follow-up  Was taken off metformin by Endo   was recently in [place] \" did not folow diet, BS higher\"    Patient is checking blood sugars: twice daily.    Results are as follows:         am -          lunchtime -    Diabetic concerns:  blood sugar frequently over 200     Symptoms of hypoglycemia (low blood sugar): One episode of low sugar, shakiness     Paresthesias (numbness or burning in feet) or sores: Yes weakness and numbness, tingling in hands, equal in both hands      Date of last diabetic eye exam: unknown       Amount of exercise or physical activity: None    Problems taking medications regularly: No    Medication side effects: none    Diet: regular (no restrictions)      Encounter Diagnoses   Name Primary?     Type 2 diabetes mellitus with diabetic nephropathy, with long-term current use of insulin (H) Yes     Screen for colon cancer, needs to do      Need for hepatitis C screening test      Diabetic polyneuropathy associated with diabetes mellitus due to underlying condition (H), kiahtter with cymbalta, preston try higher dose         Problem list and histories reviewed & adjusted, as indicated.  Additional history: as documented    Labs reviewed in EPIC    Reviewed and updated as needed this visit by clinical staff       Reviewed and updated as needed this visit by Provider         ROS:  Constitutional, HEENT, cardiovascular, pulmonary, gi and gu systems are negative, except as otherwise noted.    OBJECTIVE:                                                    /62  Pulse 75  Temp 98.6  F (37  C) (Oral)  Ht 5' 6\" (1.676 m)  Wt 220 lb 14.4 oz (100.2 kg)  SpO2 97%  BMI 35.65 kg/m2  Body mass index is 35.65 kg/(m^2).  GENERAL: alert, over weight, elderly and fatigued  NECK: no adenopathy, no asymmetry, masses, or scars and " thyroid normal to palpation  RESP: lungs clear to auscultation - no rales, rhonchi or wheezes  CV: regular rates and rhythm and normal S1 S2, no S3 or S4  ABDOMEN: soft, nontender, no hepatosplenomegaly, no masses and bowel sounds normal  PSYCH: mentation appears normal and affect normal/bright  Diabetic foot exam: DP reduced right, PT reduced right and reduced sensation at distal foot    Diagnostic Test Results:  Results for orders placed or performed in visit on 06/09/17   Hemoglobin A1c   Result Value Ref Range    Hemoglobin A1C 9.6 (H) 4.3 - 6.0 %        ASSESSMENT/PLAN:                                                            1. Type 2 diabetes mellitus with diabetic nephropathy, with long-term current use of insulin (H)  Not Well controlled off metformin   increasse lantus  Follow up with consultant as planned.   - Hemoglobin A1c; Future  - Hemoglobin A1c  - Comprehensive metabolic panel  - insulin glargine (LANTUS SOLOSTAR) 100 UNIT/ML injection; 70 units at bedtime  Dispense: 100 mL; Refill: 3  - DULoxetine (CYMBALTA) 30 MG EC capsule; Take 1 capsule (30 mg) by mouth 2 times daily  Dispense: 180 capsule; Refill: 3  Follow up in 3 months.   2. Screen for colon cancer  Will do  - Fecal colorectal cancer screen FIT; Future    3. Need for hepatitis C screening test  sent  - Hepatitis C Screen Reflex to HCV RNA Quant and Genotype    4. Diabetic polyneuropathy associated with diabetes mellitus due to underlying condition (H)  try  - DULoxetine (CYMBALTA) 30 MG EC capsule; Take 1 capsule (30 mg) by mouth 2 times daily  Dispense: 180 capsule; Refill: 3    Better diet  See Patient Instructions    Noam Jackson MD  [facility]

## 2017-06-09 NOTE — MR AVS SNAPSHOT
After Visit Summary   6/9/2017    Supriya Herr    MRN: [id number]           Patient Information     Date Of Birth          1950        Visit Information        Provider Department      6/9/2017 1:15 PM Noam Jackson [place] [facility]        Today's Diagnoses     Type 2 diabetes mellitus with diabetic nephropathy, with long-term current use of insulin (H)    -  1    Screen for colon cancer        Need for hepatitis C screening test        Diabetic polyneuropathy associated with diabetes mellitus due to underlying condition (H)           Follow-ups after your visit        Your next 10 appointments already scheduled     Jul 19, 2017 12:00 PM CDT   (Arrive by 11:45 AM)   RETURN DIABETES with Arabella Kamara PA-C   [facility] Endocr[facility] ([facility])    [address] [place] [id number] [phone]              Future tests that were ordered for you today     Open Future Orders        Priority Expected Expires Ordered    Fecal colorectal cancer screen FIT Routine 6/30/2017 9/1/2017 6/9/2017    Hemoglobin A1c Routine  6/9/2018 6/9/2017            Who to contact     If you have questions or need follow up information about today's clinic visit or your schedule please contact [facility] directly at [phone].  Normal or non-critical lab and imaging results will be communicated to you by MyChart, letter or phone within 4 business days after the clinic has received the results. If you do not hear from us within 7 days, please contact the clinic through MyChart or phone. If you have a critical or abnormal lab result, we will notify you by phone as soon as possible.  Submit refill requests through [employer] or call your pharmacy and they will forward the refill request to us. Please allow 3 business days for your refill to be completed.          Additional Information About Your Visit        Are You a Humanhart  "Information     [employer]iza lets you send messages to your doctor, view your test results, renew your prescriptions, schedule appointments and more. To sign up, go to [email or web address] . Click on \"Log in\" on the left side of the screen, which will take you to the Welcome page. Then click on \"Sign up Now\" on the right side of the page.     You will be asked to enter the access code listed below, as well as some personal information. Please follow the directions to create your username and password.     Your access code is: [email or web address]  Expires: 2017  3:43 PM     Your access code will  in 90 days. If you need help or a new code, please call your [place] clinic or [phone].        Care EveryWhere ID     This is your [facility] EveryWhere ID. This could be used by other organizations to access your [place] medical records  [id number]        Your Vitals Were     Pulse Temperature Height Pulse Oximetry BMI (Body Mass Index)       75 98.6  F (37  C) (Oral) 5' 6\" (1.676 m) 97% 35.65 kg/m2        Blood Pressure from Last 3 Encounters:   17 120/62   17 138/68   17 159/76    Weight from Last 3 Encounters:   17 220 lb 14.4 oz (100.2 kg)   17 224 lb (101.6 kg)   17 228 lb (103.4 kg)              We Performed the Following     Comprehensive metabolic panel     Hemoglobin A1c     Hepatitis C Screen Reflex to HCV RNA Quant and Genotype          Today's Medication Changes          These changes are accurate as of: 17  3:43 PM.  If you have any questions, ask your nurse or doctor.               Start taking these medicines.        Dose/Directions    DULoxetine 30 MG EC capsule   Commonly known as:  CYMBALTA   Used for:  Type 2 diabetes mellitus with diabetic nephropathy, with long-term current use of insulin (H), Diabetic polyneuropathy associated with diabetes mellitus due to underlying condition (H)   Started by:  Noam Jackson MD        Dose:  30 mg   Take 1 " capsule (30 mg) by mouth 2 times daily   Quantity:  180 capsule   Refills:  3         These medicines have changed or have updated prescriptions.        Dose/Directions    insulin glargine 100 UNIT/ML injection   Commonly known as:  LANTUS SOLOSTAR   This may have changed:  additional instructions   Used for:  Type 2 diabetes mellitus with diabetic nephropathy, with long-term current use of insulin (H)        70 units at bedtime   Quantity:  100 mL   Refills:  3            Where to get your medicines      These medications were sent to [facility]/pharmacy #[id number] - [place], [place] - [address]  [address] [address]     Phone:  [phone]     DULoxetine 30 MG EC capsule    insulin glargine 100 UNIT/ML injection                Primary Care Provider Office Phone # Fax #    Noam Luis Jackson [phone]       [facility] [address]  [facility] [address]        Thank you!     Thank you for choosing [facility]  for your care. Our goal is always to provide you with excellent care. Hearing back from our patients is one way we can continue to improve our services. Please take a few minutes to complete the written survey that you may receive in the mail after your visit with us. Thank you!             Your Updated Medication List - Protect others around you: Learn how to safely use, store and throw away your medicines at www.disposemymeds.org.          This list is accurate as of: 6/9/17  3:43 PM.  Always use your most recent med list.                   Brand Name Dispense Instructions for use    albuterol 108 (90 BASE) MCG/ACT Inhaler    PROAIR HFA/PROVENTIL HFA/VENTOLIN HFA    3 Inhaler    Inhale 2 puffs into the lungs every 6 hours as needed for shortness of breath / dyspnea or wheezing       amLODIPine 10 MG tablet    NORVASC    90 tablet    Take 1 tablet (10 mg) by mouth daily       ASPIRIN NOT PRESCRIBED    INTENTIONAL    0 each    1  each continuous prn Antiplatelet medication not prescribed intentionally due to current nosebleeds       atorvastatin 20 MG tablet    LIPITOR    90 tablet    Take 1 tablet (20 mg) by mouth daily       blood glucose monitoring test strip    ONE TOUCH ULTRA    200 strip    Use to test blood sugars 2 times daily or as directed.       carvedilol 25 MG tablet    COREG    180 tablet    Take 1 tablet (25 mg) by mouth 2 times daily (with meals)       DULoxetine 30 MG EC capsule    CYMBALTA    180 capsule    Take 1 capsule (30 mg) by mouth 2 times daily       furosemide 80 MG tablet    LASIX    180 tablet    Take 1 tablet (80 mg) by mouth 2 times daily Patient taking 80mg in AM, 40mg in PM       insulin glargine 100 UNIT/ML injection    LANTUS SOLOSTAR    100 mL    70 units at bedtime       insulin pen needle 31G X 6 MM    ULTICARE MINI    100 each    Use daily or as directed.       lisinopril 40 MG tablet    PRINIVIL/ZESTRIL    180 tablet    Take 1 tablet (40 mg) by mouth 2 times daily       loratadine 10 MG tablet    CLARITIN    90 tablet    Take 1 tablet (10 mg) by mouth daily       MULTIVITAMIN PO      1 tablet by mouth daily       * order for DME     1 Device    Equipment being ordered: Compression stockings, 20-30 MMHG, knee high       * order for DME     2 Device    Equipment being ordered: TEDS stocking  Below the knee 15-20 mg Dispense 2 Use daily       tolterodine 2 MG 24 hr capsule    DETROL LA    30 capsule    TAKE 1 CAPSULE (2 MG) BY MOUTH DAILY       * Notice:  This list has 2 medication(s) that are the same as other medications prescribed for you. Read the directions carefully, and ask your doctor or other care provider to review them with you.

## 2017-06-10 LAB — HCV AB SERPL QL IA: NORMAL

## 2017-06-15 DIAGNOSIS — Z79.4 TYPE 2 DIABETES MELLITUS WITH DIABETIC NEUROPATHY, WITH LONG-TERM CURRENT USE OF INSULIN (H): ICD-10-CM

## 2017-06-15 DIAGNOSIS — E11.40 TYPE 2 DIABETES MELLITUS WITH DIABETIC NEUROPATHY, WITH LONG-TERM CURRENT USE OF INSULIN (H): ICD-10-CM

## 2017-06-15 RX ORDER — DULOXETIN HYDROCHLORIDE 20 MG/1
CAPSULE, DELAYED RELEASE ORAL
Qty: 60 CAPSULE | Refills: 1 | OUTPATIENT
Start: 2017-06-15

## 2017-06-15 NOTE — TELEPHONE ENCOUNTER
Refill request refused as patient is taking 30 mg tablets.     Carla Field RN  [facility]

## 2017-06-15 NOTE — TELEPHONE ENCOUNTER
DULOXETINE HCL DR 20 MG CAP      Last Written Prescription Date: 6/9/17  Last Fill Quantity: 180, # refills: 3  Last Office Visit with G, P or [facility] prescribing provider: 6/9/17        BP Readings from Last 3 Encounters:   06/09/17 120/62   04/20/17 138/68   03/22/17 159/76     Pulse: (for [place]ima)  Creatinine   Date Value Ref Range Status   06/09/2017 2.11 (H) 0.52 - 1.04 mg/dL Final   ]    Last PHQ-9 score on record=   PHQ-9 SCORE 11/9/2016   Total Score -   Total Score 0

## 2017-07-19 ENCOUNTER — OFFICE VISIT (OUTPATIENT)
Dept: ENDOCRINOLOGY | Facility: CLINIC | Age: 67
End: 2017-07-19

## 2017-07-19 VITALS — HEART RATE: 95 BPM | DIASTOLIC BLOOD PRESSURE: 72 MMHG | SYSTOLIC BLOOD PRESSURE: 144 MMHG

## 2017-07-19 DIAGNOSIS — E11.40 TYPE 2 DIABETES MELLITUS WITH DIABETIC NEUROPATHY, WITH LONG-TERM CURRENT USE OF INSULIN (H): Primary | ICD-10-CM

## 2017-07-19 DIAGNOSIS — Z79.4 TYPE 2 DIABETES MELLITUS WITH DIABETIC NEUROPATHY, WITH LONG-TERM CURRENT USE OF INSULIN (H): Primary | ICD-10-CM

## 2017-07-19 ASSESSMENT — PAIN SCALES - GENERAL: PAINLEVEL: NO PAIN (0)
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
## 2017-07-19 NOTE — MR AVS SNAPSHOT
After Visit Summary   7/19/2017    Supriya Herr    MRN: [id number]           Patient Information     Date Of Birth          1950        Visit Information        Provider Department      7/19/2017 12:00 PM Arabella Kamara PA-C [facility] Endocrinology        Care Instructions    To expedite your medication refill(s), please contact your pharmacy and have them fax a refill request to: [phone].  *Please allow 3 business days for routine medication refills.  *Please allow 5 business days for controlled substance medication refills.  --------------------  For scheduling appointments (including lab work), please request an appointment through [employer], or call: [phone].    For questions for your provider or the endocrine nurse, please send a [employer] message.  For after-hours urgent issues, please dial [phone], and ask to speak with the Endocrinologist On-Call.  --------------------  Please Note: If you are active on [employer], all future test results will be sent by [employer] message only and will no longer be sent by mail. You may also receive communication directly from your physician.            Follow-ups after your visit        Your next 10 appointments already scheduled     Sep 13, 2017 11:00 AM CDT   (Arrive by 10:45 AM)   RETURN DIABETES with [facility] Endocrinology ([facility])    [address] [place] [id number] [phone]              Who to contact     Please call your clinic at [phone] to:    Ask questions about your health    Make or cancel appointments    Discuss your medicines    Learn about your test results    Speak to your doctor   If you have compliments or concerns about an experience at your clinic, or if you wish to file a complaint, please contact [facility] Patient Relations at [phone] or email us at [email or web address]          Additional Information About Your Visit        [employer] Information     [employer] is an electronic gateway that provides easy, online access to your medical records. With [employer], you can request a clinic appointment, read your test results, renew a prescription or communicate with your care team.     To sign up for [employer] visit the website at [email or web address]   You will be asked to enter the access code listed below, as well as some personal information. Please follow the directions to create your username and password.     Your access code is: [email or web address]  Expires: 2017  3:43 PM     Your access code will  in 90 days. If you need help or a new code, please contact your [facility] or call [phone] for assistance.        Care EveryWhere ID     This is your Care EveryWhere ID. This could be used by other organizations to access your [facility] medical records  [id number]        Your Vitals Were     Pulse                   95            Blood Pressure from Last 3 Encounters:   17 144/72   17 120/62   17 138/68    Weight from Last 3 Encounters:   17 100.2 kg (220 lb 14.4 oz)   17 101.6 kg (224 lb)   17 103.4 kg (228 lb)              Today, you had the following     No orders found for display       Primary Care Provider Office Phone # Fax #    Noam Luis [facility] [phone]       [facility] [address] [facility] [address]  [facility] [address]        Equal Access to Services     KALYANI WARREN AH: Hadii aad ku hadasho Soomaali, waaxda luqadaha, qaybta kaalmada adeegyada, waxay sonal hayamy sood . So [facility] [phone].    ATENCIÓN: Si habla español, tiene a santoro disposición servicios gratuitos de asistencia lingüística. Llame al [phone].    We comply with applicable federal civil rights laws and [place] laws. We do not discriminate on the basis of race, color, national origin, age,  disability sex, sexual orientation or gender identity.            Thank you!     Thank you for choosing [facility] ENDOCRINOLOGY  for your care. Our goal is always to provide you with excellent care. Hearing back from our patients is one way we can continue to improve our services. Please take a few minutes to complete the written survey that you may receive in the mail after your visit with us. Thank you!             Your Updated Medication List - Protect others around you: Learn how to safely use, store and throw away your medicines at www.disposemymeds.org.          This list is accurate as of: 7/19/17 12:38 PM.  Always use your most recent med list.                   Brand Name Dispense Instructions for use Diagnosis    albuterol 108 (90 BASE) MCG/ACT Inhaler    PROAIR HFA/PROVENTIL HFA/VENTOLIN HFA    3 Inhaler    Inhale 2 puffs into the lungs every 6 hours as needed for shortness of breath / dyspnea or wheezing    Cough       amLODIPine 10 MG tablet    NORVASC    90 tablet    Take 1 tablet (10 mg) by mouth daily    Essential hypertension with goal blood pressure less than 140/90       ASPIRIN NOT PRESCRIBED    INTENTIONAL    0 each    1 each continuous prn Antiplatelet medication not prescribed intentionally due to current nosebleeds    Anemia due to blood loss, acute       atorvastatin 20 MG tablet    LIPITOR    90 tablet    Take 1 tablet (20 mg) by mouth daily    Hyperlipidemia LDL goal <100       blood glucose monitoring test strip    ONE TOUCH ULTRA    200 strip    Use to test blood sugars 2 times daily or as directed.    Type 2 diabetes mellitus with stage 3 chronic kidney disease, without long-term current use of insulin (H)       carvedilol 25 MG tablet    COREG    180 tablet    Take 1 tablet (25 mg) by mouth 2 times daily (with meals)    Essential hypertension with goal blood pressure less than 140/90       DULoxetine 30 MG EC capsule    CYMBALTA    180 capsule    Take 1 capsule (30 mg) by mouth 2 times  daily    Type 2 diabetes mellitus with diabetic nephropathy, with long-term current use of insulin (H), Diabetic polyneuropathy associated with diabetes mellitus due to underlying condition (H)       furosemide 80 MG tablet    LASIX    180 tablet    Take 1 tablet (80 mg) by mouth 2 times daily Patient taking 80mg in AM, 40mg in PM    Lymphedema of both lower extremities       insulin glargine 100 UNIT/ML injection    LANTUS SOLOSTAR    100 mL    70 units at bedtime    Type 2 diabetes mellitus with diabetic nephropathy, with long-term current use of insulin (H)       insulin pen needle 31G X 6 MM    ULTICARE MINI    100 each    Use daily or as directed.    Type 2 diabetes, HbA1c goal < 7% (H)       lisinopril 40 MG tablet    PRINIVIL/ZESTRIL    180 tablet    Take 1 tablet (40 mg) by mouth 2 times daily    Proteinuria       loratadine 10 MG tablet    CLARITIN    90 tablet    Take 1 tablet (10 mg) by mouth daily    Nasal congestion       MULTIVITAMIN PO      1 tablet by mouth daily        * order for DME     1 Device    Equipment being ordered: Compression stockings, 20-30 MMHG, knee high    Edema, Hypertension goal BP (blood pressure) < 140/90       * order for DME     2 Device    Equipment being ordered: TEDS stocking  Below the knee 15-20 mg Dispense 2 Use daily    Localized edema       tolterodine 2 MG 24 hr capsule    DETROL LA    30 capsule    TAKE 1 CAPSULE (2 MG) BY MOUTH DAILY    Urge incontinence of urine       * Notice:  This list has 2 medication(s) that are the same as other medications prescribed for you. Read the directions carefully, and ask your doctor or other care provider to review them with you.

## 2017-07-19 NOTE — NURSING NOTE
Chief Complaint   Patient presents with     SUSANA Decker, [facility]  Endocrinology & Diabetes 3G

## 2017-07-19 NOTE — PATIENT INSTRUCTIONS
To expedite your medication refill(s), please contact your pharmacy and have them fax a refill request to: [phone].  *Please allow 3 business days for routine medication refills.  *Please allow 5 business days for controlled substance medication refills.  --------------------  For scheduling appointments (including lab work), please request an appointment through [employer], or call: [phone].    For questions for your provider or the endocrine nurse, please send a [employer] message.  For after-hours urgent issues, please dial [phone], and ask to speak with the Endocrinologist On-Call.  --------------------  Please Note: If you are active on [employer], all future test results will be sent by [employer] message only and will no longer be sent by mail. You may also receive communication directly from your physician.
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
## 2017-08-30 ENCOUNTER — TELEPHONE (OUTPATIENT)
Dept: FAMILY MEDICINE | Facility: CLINIC | Age: 67
End: 2017-08-30

## 2017-08-30 DIAGNOSIS — E11.22 TYPE 2 DIABETES MELLITUS WITH STAGE 3 CHRONIC KIDNEY DISEASE, WITHOUT LONG-TERM CURRENT USE OF INSULIN (H): ICD-10-CM

## 2017-08-30 DIAGNOSIS — N18.30 TYPE 2 DIABETES MELLITUS WITH STAGE 3 CHRONIC KIDNEY DISEASE, WITHOUT LONG-TERM CURRENT USE OF INSULIN (H): ICD-10-CM

## 2017-09-13 ENCOUNTER — OFFICE VISIT (OUTPATIENT)
Dept: EDUCATION SERVICES | Facility: CLINIC | Age: 67
End: 2017-09-13

## 2017-09-13 ENCOUNTER — TELEPHONE (OUTPATIENT)
Dept: ENDOCRINOLOGY | Facility: CLINIC | Age: 67
End: 2017-09-13

## 2017-09-13 ENCOUNTER — OFFICE VISIT (OUTPATIENT)
Dept: ENDOCRINOLOGY | Facility: CLINIC | Age: 67
End: 2017-09-13

## 2017-09-13 VITALS — DIASTOLIC BLOOD PRESSURE: 76 MMHG | SYSTOLIC BLOOD PRESSURE: 130 MMHG | HEART RATE: 69 BPM

## 2017-09-13 DIAGNOSIS — Z79.4 TYPE 2 DIABETES MELLITUS WITH DIABETIC NEUROPATHY, WITH LONG-TERM CURRENT USE OF INSULIN (H): Primary | ICD-10-CM

## 2017-09-13 DIAGNOSIS — E11.40 TYPE 2 DIABETES MELLITUS WITH DIABETIC NEUROPATHY, WITH LONG-TERM CURRENT USE OF INSULIN (H): Primary | ICD-10-CM

## 2017-09-13 DIAGNOSIS — E11.9 DIABETES MELLITUS, TYPE 2 (H): Primary | ICD-10-CM

## 2017-09-13 DIAGNOSIS — E11.9 TYPE 2 DIABETES, HBA1C GOAL < 8% (H): Primary | ICD-10-CM

## 2017-09-13 LAB — HBA1C MFR BLD: 9.2 % (ref 4.3–6)

## 2017-09-13 RX ORDER — INSULIN ASPART 100 [IU]/ML
INJECTION, SOLUTION INTRAVENOUS; SUBCUTANEOUS
Qty: 15 ML | Refills: 11 | Status: ON HOLD | OUTPATIENT
Start: 2017-09-13 | End: 2017-11-13

## 2017-09-13 NOTE — PROGRESS NOTES
"DIABETES EDUCATION NOTE:    Supriya Herr presents today for education related to Type 2 diabetes.    Patient is being treated with:  insulin and SMBG  She is accompanied by sister    PATIENT CONCERNS RELATED TO DIABETES SELF MANAGEMENT:   Getting blood sugar down      Current Diabetes Management per Patient:  Taking diabetes medications?   yes:     Diabetes Medication(s)     Insulin Sig    NOVOLOG FLEXPEN 100 UNIT/ML soln Take 6 units plus sliding scale before each meals    insulin glargine (LANTUS SOLOSTAR) 100 UNIT/ML injection 70 units at bedtime        Monitoring  Patient glucose self monitoring as follows: four times daily.   BG meter: unknown meter  BG results: not available     BG values are: Not in goal  Patient's most recent   Lab Results   Component Value Date    A1C 9.6 06/09/2017    is not meeting goal of <8.0        Exercise: minimal exercise    Nutrition:   See daily eating schedule below:     Breakfast: (8-8:30a) 2 slices of toast with cheese, water or 8 oz 1 milk  Snack: apple or orange  Lunch:(12-1:30p) skips or frozen meal, water or chocolate milk  Snack: apple or orange or popcorn  Dinner: (5p-7p) meat and potato and veg, milk, water or tea        Hypoglycemia:   Patient is at risk of hypoglycemia? Yes  Frequency: rare                                  EDUCATION and INSTRUCTION PROVIDED AT THIS VISIT:     INSTRUCTIONS FOR TAKING INSULIN:    1. Take Novolog - 6 units at breakfast, lunch and supper.     - Do a 2 unit \"air shot\" before each injection, be sure a stream of insulin comes out of the needle before you give your injection. After you inject, hold the needle under the skin to the count of 10 to be sure all of the insulin goes in.     - Rotate injection sites, keeping at least 1 inch apart from last injection site and 2 inches away from belly button or surgical scars.    2. Pen - Use a new pen needle for each injection. Always remove pen needle from the insulin pen after use and do not " store insulin pens with the needle on the pen.     3. Store insulin you are not using in the refrigerator (do not freeze). Take new insulin out of the refrigerator a few hours prior to use to bring to room temperature.     4. Once opened Novolog can be kept at room temperature for 28 days after opened.. Do not use the opened insulin after this time has passed, even if there is still medicine inside.     5. Always carry your blood sugar meter and a sugar source like glucose tablets with you in case of a low blood sugar. Treat a low blood sugar (less than 70) with 15 grams of carbohydrate (1 carb choice). Wait 15 minutes, recheck blood sugar. If blood sugar is still below 70, repeat the treatment.    6. Wear Medical ID or carry a wallet card stating you have Diabetes.    7. Call your doctor or diabetes educator if you begin having low blood sugars or if you have questions or concerns.           Patient-stated goal written and given to Supriya Herr.  Verbalized and demonstrated understanding of instructions.     PLAN:  See patient instructions  AVS printed and given to patient    FOLLOW-UP:        Time spent with patient at today's visit was 40 minutes.      Any diabetes medication dose changes were made via the CDE Protocol and Collaborative Practice Agreement with [facility] and  [facility].  A copy of this encounter was provided to patient's referring provider.
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
## 2017-09-13 NOTE — MR AVS SNAPSHOT
After Visit Summary   9/13/2017    Supriya Herr    MRN: [id number]           Patient Information     Date Of Birth          1950        Visit Information        Provider Department      9/13/2017 11:30 AM Tori York RN [facility] Diabetes        Today's Diagnoses     Type 2 diabetes, HbA1C goal < 8% (H)    -  1      Care Instructions    1.)  Start Novolog or Humalog 6 units before each meal.  Take 5-15 minutes before you start eating.    2.) Check your blood sugar before each meal.  Add extra units to the 6 units using the following scale if your blood sugar is over 150.      150-200 = 1 unit  201-250 = 2 units  251-300 = 3 units   301-350 = 4 units  351-400 = 5 units  401-450 = 6 units  451-500 = 7 units  501-550 = 8 units  551-600 = 9 units call your healthcare team    3.)  glucose tablets at the pharmacy and carry them with you.  It would take 3-4 of them to correct a blood sugar 50-70.  It would take 8 of them if you blood sugar is under 50.    4.) Call or e-mail blood sugars to me in 7 days.  Your insulin will most likely need to be adjusted.                      Tori York RN,STELLAE  [address] [address]  Phone: [phone]  [email or web address]                  Follow-ups after your visit        Your next 10 appointments already scheduled     Oct 02, 2017 10:40 AM CDT   (Arrive by 10:25 AM)   RETURN FOOT/ANKLE with Eleazar Pack DPM   [facility] ([facility])    [address] [place] [id number] [phone]            Nov 16, 2017 11:00 AM CST   (Arrive by 10:45 AM)   RETURN DIABETES with Arabella Kamara PA-C   [facility] Endocrinology ([facility])    [address] [place] [address] [phone]              Who to contact     Please call your clinic at [phone] to:    Ask questions about your  health    Make or cancel appointments    Discuss your medicines    Learn about your test results    Speak to your doctor   If you have compliments or concerns about an experience at your clinic, or if you wish to file a complaint, please contact [facility] Patient Relations at [phone] or email us at [email or web address]         Additional Information About Your Visit        [employer] Information     [employer] is an electronic gateway that provides easy, online access to your medical records. With Qranio, you can request a clinic appointment, read your test results, renew a prescription or communicate with your care team.     To sign up for Qranio visit the website at [email or web address]   You will be asked to enter the access code listed below, as well as some personal information. Please follow the directions to create your username and password.     Your access code is: [email or web address]  Expires: 12/10/2017  1:38 PM     Your access code will  in 90 days. If you need help or a new code, please contact your [facility] or call [phone] for assistance.        Care EveryWhere ID     This is your Care EveryWhere ID. This could be used by other organizations to access your [facility] medical records  [id number]         Blood Pressure from Last 3 Encounters:   17 130/76   17 144/72   17 120/62    Weight from Last 3 Encounters:   17 100.2 kg (220 lb 14.4 oz)   17 101.6 kg (224 lb)   17 103.4 kg (228 lb)              Today, you had the following     No orders found for display         Today's Medication Changes          These changes are accurate as of: 17 12:16 PM.  If you have any questions, ask your nurse or doctor.               Start taking these medicines.        Dose/Directions    insulin lispro 100 UNIT/ML injection   Commonly known as:  HumaLOG KWIKpen   Used for:  Type 2 diabetes, HbA1C  goal < 8% (H)   Started by:  Tori York, RN        Take 6 units plus sliding scale before each meals.  Sliding scale: 120-149 1 unit 150-200 1 units 201-250 2 units 251-300 3 units 301-350 4 units 351-400 5 units 401-450 6 units 451-500 7 units 501-550 8 units  551-600 9 units   Quantity:  15 mL   Refills:  11         Stop taking these medicines if you haven't already. Please contact your care team if you have questions.     loratadine 10 MG tablet   Commonly known as:  CLARITIN   Stopped by:  Arabella Kamara PA-C                Where to get your medicines      Some of these will need a paper prescription and others can be bought over the counter.  Ask your nurse if you have questions.     Bring a paper prescription for each of these medications     insulin lispro 100 UNIT/ML injection                Primary Care Provider Office Phone # Fax #    Noam Luis Jackson [phone]       [facility] [address] [address]        Equal Access to Services     [facility]: Hadii aad ku hadasho Soomaali, waaxda luqadaha, qaybta kaalmada adeegyada, waxay idiin hayamy sood . So [facility] [phone].    ATENCIÓN: Si habla español, tiene a santoro disposición servicios gratuitos de asistencia lingüística. [facility] [phone].    We comply with applicable federal civil rights laws and [place] laws. We do not discriminate on the basis of race, color, national origin, age, disability sex, sexual orientation or gender identity.            Thank you!     Thank you for choosing [facility] DIABETES  for your care. Our goal is always to provide you with excellent care. Hearing back from our patients is one way we can continue to improve our services. Please take a few minutes to complete the written survey that you may receive in the mail after your visit with us. Thank you!             Your Updated Medication List - Protect others around you: Learn how to safely use, store and  throw away your medicines at www.disposemymeds.org.          This list is accurate as of: 9/13/17 12:16 PM.  Always use your most recent med list.                   Brand Name Dispense Instructions for use Diagnosis    albuterol 108 (90 BASE) MCG/ACT Inhaler    PROAIR HFA/PROVENTIL HFA/VENTOLIN HFA    3 Inhaler    Inhale 2 puffs into the lungs every 6 hours as needed for shortness of breath / dyspnea or wheezing    Cough       amLODIPine 10 MG tablet    NORVASC    90 tablet    Take 1 tablet (10 mg) by mouth daily    Essential hypertension with goal blood pressure less than 140/90       ASPIRIN NOT PRESCRIBED    INTENTIONAL    0 each    1 each continuous prn Antiplatelet medication not prescribed intentionally due to current nosebleeds    Anemia due to blood loss, acute       atorvastatin 20 MG tablet    LIPITOR    90 tablet    Take 1 tablet (20 mg) by mouth daily    Hyperlipidemia LDL goal <100       blood glucose monitoring test strip    ONE TOUCH ULTRA    200 strip    Use to test blood sugars 2 times daily or as directed.    Type 2 diabetes mellitus with stage 3 chronic kidney disease, without long-term current use of insulin (H)       carvedilol 25 MG tablet    COREG    180 tablet    Take 1 tablet (25 mg) by mouth 2 times daily (with meals)    Essential hypertension with goal blood pressure less than 140/90       DULoxetine 30 MG EC capsule    CYMBALTA    180 capsule    Take 1 capsule (30 mg) by mouth 2 times daily    Type 2 diabetes mellitus with diabetic nephropathy, with long-term current use of insulin (H), Diabetic polyneuropathy associated with diabetes mellitus due to underlying condition (H)       furosemide 80 MG tablet    LASIX    180 tablet    Take 1 tablet (80 mg) by mouth 2 times daily Patient taking 80mg in AM, 40mg in PM    Lymphedema of both lower extremities       insulin glargine 100 UNIT/ML injection    LANTUS SOLOSTAR    100 mL    70 units at bedtime    Type 2 diabetes mellitus with diabetic  nephropathy, with long-term current use of insulin (H)       insulin lispro 100 UNIT/ML injection    HumaLOG KWIKpen    15 mL    Take 6 units plus sliding scale before each meals.  Sliding scale: 120-149 1 unit 150-200 1 units 201-250 2 units 251-300 3 units 301-350 4 units 351-400 5 units 401-450 6 units 451-500 7 units 501-550 8 units  551-600 9 units    Type 2 diabetes, HbA1C goal < 8% (H)       insulin pen needle 31G X 6 MM    ULTICARE MINI    100 each    Use daily or as directed.    Type 2 diabetes, HbA1c goal < 7% (H)       lisinopril 40 MG tablet    PRINIVIL/ZESTRIL    180 tablet    Take 1 tablet (40 mg) by mouth 2 times daily    Proteinuria       MULTIVITAMIN PO      1 tablet by mouth daily        * order for DME     1 Device    Equipment being ordered: Compression stockings, 20-30 MMHG, knee high    Edema, Hypertension goal BP (blood pressure) < 140/90       * order for DME     2 Device    Equipment being ordered: TEDS stocking  Below the knee 15-20 mg Dispense 2 Use daily    Localized edema       tolterodine 2 MG 24 hr capsule    DETROL LA    30 capsule    TAKE 1 CAPSULE (2 MG) BY MOUTH DAILY    Urge incontinence of urine       * Notice:  This list has 2 medication(s) that are the same as other medications prescribed for you. Read the directions carefully, and ask your doctor or other care provider to review them with you.
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
## 2017-09-13 NOTE — NURSING NOTE
Chief Complaint   Patient presents with     RECHECK     F/U TYPE II DM     Kelly Decker, CMA  Endocrinology & Diabetes 3G    Capillary Fingerstick performed for an Hemoglobin A1C test

## 2017-09-13 NOTE — MR AVS SNAPSHOT
After Visit Summary   9/13/2017    Supriya Herr    MRN: [id number]           Patient Information     Date Of Birth          1950        Visit Information        Provider Department      9/13/2017 11:00 AM Arabella Kamara PA-C [facility] Endocrinology         Follow-ups after your visit        Your next 10 appointments already scheduled     Oct 02, 2017 10:40 AM CDT   (Arrive by 10:25 AM)   RETURN FOOT/ANKLE with Eleazar Pack DPM   [facility] ([facility])    [address] [place] [id number] [phone]            Nov 16, 2017 11:00 AM CST   (Arrive by 10:45 AM)   RETURN DIABETES with ILA [facility] Endocrinology ([facility])    [address] [place] [id number] [phone]              Who to contact     Please call your clinic at [phone] to:    Ask questions about your health    Make or cancel appointments    Discuss your medicines    Learn about your test results    Speak to your doctor   If you have compliments or concerns about an experience at your clinic, or if you wish to file a complaint, please contact [facility] Patient Relations at [phone] or email us at [email or web address]         Additional Information About Your Visit        MyChart Information     [employer] is an electronic gateway that provides easy, online access to your medical records. With [employer], you can request a clinic appointment, read your test results, renew a prescription or communicate with your care team.     To sign up for [employer] visit the website at [email or web address]   You will be asked to enter the access code listed below, as well as some personal information. Please follow the directions to create your username and password.     Your access code is: [email or web address]  Expires: 12/10/2017  1:38 PM      Your access code will  in 90 days. If you need help or a new code, please contact your [facility] or call [phone] for assistance.        Care EveryWhere ID     This is your Care EveryWhere ID. This could be used by other organizations to access your [place] medical records  [id number]        Your Vitals Were     Pulse                   69            Blood Pressure from Last 3 Encounters:   17 130/76   17 144/72   17 120/62    Weight from Last 3 Encounters:   17 100.2 kg (220 lb 14.4 oz)   17 101.6 kg (224 lb)   17 103.4 kg (228 lb)              Today, you had the following     No orders found for display         Today's Medication Changes          These changes are accurate as of: 17 11:46 AM.  If you have any questions, ask your nurse or doctor.               Stop taking these medicines if you haven't already. Please contact your care team if you have questions.     loratadine 10 MG tablet   Commonly known as:  CLARITIN   Stopped by:  Arabella Kamara PA-C                    Primary Care Provider Office Phone # Fax #    Noam Luis Jackson [phone]       [address] [place] [address]        Equal Access to Services     KALYANI WARREN AH: Hadii danisha jacobson hadasho Soomaali, waaxda luqadaha, qaybta kaalmada adeegyada, madeline pruitt hayamy sood . So [facility] [phone].    ATENCIÓN: Si habla español, tiene a santoro disposición servicios gratuitos de asistencia lingüística. Llflako al [phone].    We comply with applicable federal civil rights laws and [place] laws. We do not discriminate on the basis of race, color, national origin, age, disability sex, sexual orientation or gender identity.            Thank you!     Thank you for choosing [facility]  for your care. Our goal is always to provide you with excellent care. Hearing back from our patients is one way we can  continue to improve our services. Please take a few minutes to complete the written survey that you may receive in the mail after your visit with us. Thank you!             Your Updated Medication List - Protect others around you: Learn how to safely use, store and throw away your medicines at www.disposemymeds.org.          This list is accurate as of: 9/13/17 11:46 AM.  Always use your most recent med list.                   Brand Name Dispense Instructions for use Diagnosis    albuterol 108 (90 BASE) MCG/ACT Inhaler    PROAIR HFA/PROVENTIL HFA/VENTOLIN HFA    3 Inhaler    Inhale 2 puffs into the lungs every 6 hours as needed for shortness of breath / dyspnea or wheezing    Cough       amLODIPine 10 MG tablet    NORVASC    90 tablet    Take 1 tablet (10 mg) by mouth daily    Essential hypertension with goal blood pressure less than 140/90       ASPIRIN NOT PRESCRIBED    INTENTIONAL    0 each    1 each continuous prn Antiplatelet medication not prescribed intentionally due to current nosebleeds    Anemia due to blood loss, acute       atorvastatin 20 MG tablet    LIPITOR    90 tablet    Take 1 tablet (20 mg) by mouth daily    Hyperlipidemia LDL goal <100       blood glucose monitoring test strip    ONE TOUCH ULTRA    200 strip    Use to test blood sugars 2 times daily or as directed.    Type 2 diabetes mellitus with stage 3 chronic kidney disease, without long-term current use of insulin (H)       carvedilol 25 MG tablet    COREG    180 tablet    Take 1 tablet (25 mg) by mouth 2 times daily (with meals)    Essential hypertension with goal blood pressure less than 140/90       DULoxetine 30 MG EC capsule    CYMBALTA    180 capsule    Take 1 capsule (30 mg) by mouth 2 times daily    Type 2 diabetes mellitus with diabetic nephropathy, with long-term current use of insulin (H), Diabetic polyneuropathy associated with diabetes mellitus due to underlying condition (H)       furosemide 80 MG tablet    LASIX    180 tablet     Take 1 tablet (80 mg) by mouth 2 times daily Patient taking 80mg in AM, 40mg in PM    Lymphedema of both lower extremities       insulin glargine 100 UNIT/ML injection    LANTUS SOLOSTAR    100 mL    70 units at bedtime    Type 2 diabetes mellitus with diabetic nephropathy, with long-term current use of insulin (H)       insulin pen needle 31G X 6 MM    ULTICARE MINI    100 each    Use daily or as directed.    Type 2 diabetes, HbA1c goal < 7% (H)       lisinopril 40 MG tablet    PRINIVIL/ZESTRIL    180 tablet    Take 1 tablet (40 mg) by mouth 2 times daily    Proteinuria       MULTIVITAMIN PO      1 tablet by mouth daily        * order for DME     1 Device    Equipment being ordered: Compression stockings, 20-30 MMHG, knee high    Edema, Hypertension goal BP (blood pressure) < 140/90       * order for DME     2 Device    Equipment being ordered: TEDS stocking  Below the knee 15-20 mg Dispense 2 Use daily    Localized edema       tolterodine 2 MG 24 hr capsule    DETROL LA    30 capsule    TAKE 1 CAPSULE (2 MG) BY MOUTH DAILY    Urge incontinence of urine       * Notice:  This list has 2 medication(s) that are the same as other medications prescribed for you. Read the directions carefully, and ask your doctor or other care provider to review them with you.

## 2017-09-13 NOTE — PATIENT INSTRUCTIONS
1.)  Start Novolog or Humalog 6 units before each meal.  Take 5-15 minutes before you start eating.    2.) Check your blood sugar before each meal.  Add extra units to the 6 units using the following scale if your blood sugar is over 150.      150-200 = 1 unit  201-250 = 2 units  251-300 = 3 units   301-350 = 4 units  351-400 = 5 units  401-450 = 6 units  451-500 = 7 units  501-550 = 8 units  551-600 = 9 units call your healthcare team    3.)  glucose tablets at the pharmacy and carry them with you.  It would take 3-4 of them to correct a blood sugar 50-70.  It would take 8 of them if you blood sugar is under 50.    4.) Call or e-mail blood sugars to me in 7 days.  Your insulin will most likely need to be adjusted.                      Tori York RN,CDE  [address] [address]  Phone: [phone]  [email or web address]

## 2017-09-21 ENCOUNTER — OFFICE VISIT (OUTPATIENT)
Dept: FAMILY MEDICINE | Facility: CLINIC | Age: 67
End: 2017-09-21
Payer: MEDICARE

## 2017-09-21 VITALS
HEART RATE: 77 BPM | BODY MASS INDEX: 36.15 KG/M2 | DIASTOLIC BLOOD PRESSURE: 61 MMHG | OXYGEN SATURATION: 95 % | TEMPERATURE: 96.9 F | SYSTOLIC BLOOD PRESSURE: 137 MMHG | WEIGHT: 224 LBS

## 2017-09-21 DIAGNOSIS — Z23 NEED FOR PROPHYLACTIC VACCINATION AND INOCULATION AGAINST INFLUENZA: ICD-10-CM

## 2017-09-21 DIAGNOSIS — M54.50 ACUTE RIGHT-SIDED LOW BACK PAIN WITHOUT SCIATICA: ICD-10-CM

## 2017-09-21 DIAGNOSIS — I10 HYPERTENSION GOAL BP (BLOOD PRESSURE) < 140/90: ICD-10-CM

## 2017-09-21 DIAGNOSIS — S63.696A SPRAIN OF OTHER SITE OF RIGHT LITTLE FINGER, INITIAL ENCOUNTER: ICD-10-CM

## 2017-09-21 DIAGNOSIS — N18.30 TYPE 2 DIABETES MELLITUS WITH STAGE 3 CHRONIC KIDNEY DISEASE, WITHOUT LONG-TERM CURRENT USE OF INSULIN (H): ICD-10-CM

## 2017-09-21 DIAGNOSIS — M25.512 ACUTE PAIN OF LEFT SHOULDER: Primary | ICD-10-CM

## 2017-09-21 DIAGNOSIS — E11.22 TYPE 2 DIABETES MELLITUS WITH STAGE 3 CHRONIC KIDNEY DISEASE, WITHOUT LONG-TERM CURRENT USE OF INSULIN (H): ICD-10-CM

## 2017-09-21 DIAGNOSIS — N18.30 CKD (CHRONIC KIDNEY DISEASE) STAGE 3, GFR 30-59 ML/MIN (H): ICD-10-CM

## 2017-09-21 LAB
ERYTHROCYTE [DISTWIDTH] IN BLOOD BY AUTOMATED COUNT: 13.3 % (ref 10–15)
HCT VFR BLD AUTO: 29.6 % (ref 35–47)
HGB BLD-MCNC: 9.7 G/DL (ref 11.7–15.7)
MCH RBC QN AUTO: 28.6 PG (ref 26.5–33)
MCHC RBC AUTO-ENTMCNC: 32.8 G/DL (ref 31.5–36.5)
MCV RBC AUTO: 87 FL (ref 78–100)
PLATELET # BLD AUTO: 290 10E9/L (ref 150–450)
RBC # BLD AUTO: 3.39 10E12/L (ref 3.8–5.2)
WBC # BLD AUTO: 6.6 10E9/L (ref 4–11)

## 2017-09-21 PROCEDURE — 99214 OFFICE O/P EST MOD 30 MIN: CPT | Mod: 25 | Performed by: FAMILY MEDICINE

## 2017-09-21 PROCEDURE — 80053 COMPREHEN METABOLIC PANEL: CPT | Performed by: FAMILY MEDICINE

## 2017-09-21 PROCEDURE — G0008 ADMIN INFLUENZA VIRUS VAC: HCPCS | Performed by: FAMILY MEDICINE

## 2017-09-21 PROCEDURE — 90662 IIV NO PRSV INCREASED AG IM: CPT | Performed by: FAMILY MEDICINE

## 2017-09-21 PROCEDURE — 85027 COMPLETE CBC AUTOMATED: CPT | Performed by: FAMILY MEDICINE

## 2017-09-21 PROCEDURE — 80061 LIPID PANEL: CPT | Performed by: FAMILY MEDICINE

## 2017-09-21 PROCEDURE — 36415 COLL VENOUS BLD VENIPUNCTURE: CPT | Performed by: FAMILY MEDICINE

## 2017-09-21 NOTE — MR AVS SNAPSHOT
After Visit Summary   9/21/2017    Supriya Herr    MRN: [id number]           Patient Information     Date Of Birth          1950        Visit Information        Provider Department      9/21/2017 2:15 PM Noam Jackson MD [facility]        Today's Diagnoses     Acute pain of left shoulder    -  1    Type 2 diabetes mellitus with stage 3 chronic kidney disease, without long-term current use of insulin (H)        Need for prophylactic vaccination and inoculation against influenza        Acute right-sided low back pain without sciatica        Hypertension goal BP (blood pressure) < 140/90        Sprain of other site of right little finger, initial encounter           Follow-ups after your visit        Additional Services     PHYSICAL THERAPY REFERRAL       PHYSICAL THERAPY referral:    Call one number to schedule at any [facility] location   [phone].    Treatment: Evaluation & Treatment  Special Instructions/Modalities: diagnosis and treat    DX  Acute pain of left shoulder  (primary encounter diagnosis)  Acute right-sided low back pain without sciatica   Special Programs: patient wants to go to [facility] which is close to her home , she will call her insurance to make sure that they in network    Please be aware that coverage of these services is subject to the terms and limitations of your health insurance plan.  Call member services at your health plan with any benefit or coverage questions.      **Note to Provider** To refer patients to therapy outside of the location list, change the order class to External Referral in the order composer.                  Your next 10 appointments already scheduled     Oct 02, 2017 10:40 AM CDT   (Arrive by 10:25 AM)   RETURN FOOT/ANKLE with Eleazar Pack DPM   [facility] ([facility])    [address] [place] [id number]  "  [phone]            2017 11:00 AM CST   (Arrive by 10:45 AM)   RETURN DIABETES with ILA German [facility] ([facility])    [address] [place] [address] [phone]              Who to contact     If you have questions or need follow up information about today's clinic visit or your schedule please contact [facility] directly at [phone].  Normal or non-critical lab and imaging results will be communicated to you by [employer], letter or phone within 4 business days after the clinic has received the results. If you do not hear from us within 7 days, please contact the clinic through [employer] or phone. If you have a critical or abnormal lab result, we will notify you by phone as soon as possible.  Submit refill requests through [employer] or call your pharmacy and they will forward the refill request to us. Please allow 3 business days for your refill to be completed.          Additional Information About Your Visit        [employer] Information     [employer] lets you send messages to your doctor, view your test results, renew your prescriptions, schedule appointments and more. To sign up, go to [email or web address] . Click on \"Log in\" on the left side of the screen, which will take you to the Welcome page. Then click on \"Sign up Now\" on the right side of the page.     You will be asked to enter the access code listed below, as well as some personal information. Please follow the directions to create your username and password.     Your access code is: [email or web address]  Expires: 12/10/2017  1:38 PM     Your access code will  in 90 days. If you need help or a new code, please call your [facility] or [phone].        Care EveryWhere ID     This is your Care EveryWhere ID. This could be used by other organizations to access your [place] medical records  [id number]        Your Vitals Were     Pulse " Temperature Pulse Oximetry BMI (Body Mass Index)          77 96.9  F (36.1  C) (Oral) 95% 36.15 kg/m2         Blood Pressure from Last 3 Encounters:   09/21/17 137/61   09/13/17 130/76   07/19/17 144/72    Weight from Last 3 Encounters:   09/21/17 224 lb (101.6 kg)   06/09/17 220 lb 14.4 oz (100.2 kg)   03/22/17 224 lb (101.6 kg)              We Performed the Following     CBC with platelets     Comprehensive metabolic panel     FLU VACCINE, INCREASED ANTIGEN, PRESV FREE, AGE 65+ [30684]     Lipid panel reflex to direct LDL     PHYSICAL THERAPY REFERRAL        Primary Care Provider Office Phone # Fax #    Noam Luis Jackson [phone]       [address] [address]        Equal Access to Services     KALYANI WARREN : Hadii danisha jacobson hadasho Soaleksandar, waaxda luqadaha, qaybta kaalmada adetereyada, madeline sood . So [facility] [phone].    ATENCIÓN: Si habla español, tiene a santoro disposición servicios gratuitos de asistencia lingüística. Llame al [phone].    We comply with applicable federal civil rights laws and [place] laws. We do not discriminate on the basis of race, color, national origin, age, disability sex, sexual orientation or gender identity.            Thank you!     Thank you for choosing [facility]  for your care. Our goal is always to provide you with excellent care. Hearing back from our patients is one way we can continue to improve our services. Please take a few minutes to complete the written survey that you may receive in the mail after your visit with us. Thank you!             Your Updated Medication List - Protect others around you: Learn how to safely use, store and throw away your medicines at www.disposemymeds.org.          This list is accurate as of: 9/21/17  2:19 PM.  Always use your most recent med list.                   Brand Name Dispense Instructions for use Diagnosis    albuterol 108 (90 BASE) MCG/ACT  Inhaler    PROAIR HFA/PROVENTIL HFA/VENTOLIN HFA    3 Inhaler    Inhale 2 puffs into the lungs every 6 hours as needed for shortness of breath / dyspnea or wheezing    Cough       amLODIPine 10 MG tablet    NORVASC    90 tablet    Take 1 tablet (10 mg) by mouth daily    Essential hypertension with goal blood pressure less than 140/90       ASPIRIN NOT PRESCRIBED    INTENTIONAL    0 each    1 each continuous prn Antiplatelet medication not prescribed intentionally due to current nosebleeds    Anemia due to blood loss, acute       atorvastatin 20 MG tablet    LIPITOR    90 tablet    Take 1 tablet (20 mg) by mouth daily    Hyperlipidemia LDL goal <100       blood glucose monitoring test strip    ONE TOUCH ULTRA    200 strip    Use to test blood sugars 2 times daily or as directed.    Type 2 diabetes mellitus with stage 3 chronic kidney disease, without long-term current use of insulin (H)       carvedilol 25 MG tablet    COREG    180 tablet    Take 1 tablet (25 mg) by mouth 2 times daily (with meals)    Essential hypertension with goal blood pressure less than 140/90       DULoxetine 30 MG EC capsule    CYMBALTA    180 capsule    Take 1 capsule (30 mg) by mouth 2 times daily    Type 2 diabetes mellitus with diabetic nephropathy, with long-term current use of insulin (H), Diabetic polyneuropathy associated with diabetes mellitus due to underlying condition (H)       furosemide 80 MG tablet    LASIX    180 tablet    Take 1 tablet (80 mg) by mouth 2 times daily Patient taking 80mg in AM, 40mg in PM    Lymphedema of both lower extremities       insulin glargine 100 UNIT/ML injection    LANTUS SOLOSTAR    100 mL    70 units at bedtime    Type 2 diabetes mellitus with diabetic nephropathy, with long-term current use of insulin (H)       insulin pen needle 31G X 6 MM    ULTICARE MINI    100 each    Use daily or as directed.    Type 2 diabetes, HbA1c goal < 7% (H)       lisinopril 40 MG tablet    PRINIVIL/ZESTRIL    180 tablet     Take 1 tablet (40 mg) by mouth 2 times daily    Proteinuria       MULTIVITAMIN PO      1 tablet by mouth daily        NovoLOG FLEXPEN 100 UNIT/ML injection   Generic drug:  insulin aspart     15 mL    Take 6 units plus sliding scale before each meals    Diabetes mellitus, type 2 (H)       * order for DME     1 Device    Equipment being ordered: Compression stockings, 20-30 MMHG, knee high    Edema, Hypertension goal BP (blood pressure) < 140/90       * order for DME     2 Device    Equipment being ordered: TEDS stocking  Below the knee 15-20 mg Dispense 2 Use daily    Localized edema       tolterodine 2 MG 24 hr capsule    DETROL LA    30 capsule    TAKE 1 CAPSULE (2 MG) BY MOUTH DAILY    Urge incontinence of urine       * Notice:  This list has 2 medication(s) that are the same as other medications prescribed for you. Read the directions carefully, and ask your doctor or other care provider to review them with you.

## 2017-09-21 NOTE — NURSING NOTE
"Chief Complaint   Patient presents with     Diabetes     Musculoskeletal Problem       Initial /55 (BP Location: Left arm, Patient Position: Chair, Cuff Size: Adult Large)  Pulse 77  Temp 96.9  F (36.1  C) (Oral)  Wt 224 lb (101.6 kg)  SpO2 95%  BMI 36.15 kg/m2 Estimated body mass index is 36.15 kg/(m^2) as calculated from the following:    Height as of 6/9/17: 5' 6\" (1.676 m).    Weight as of this encounter: 224 lb (101.6 kg).  Medication Reconciliation: complete     Celestina Wei CMA    "

## 2017-09-22 ENCOUNTER — TELEPHONE (OUTPATIENT)
Dept: FAMILY MEDICINE | Facility: CLINIC | Age: 67
End: 2017-09-22

## 2017-09-22 LAB
ALBUMIN SERPL-MCNC: 2.5 G/DL (ref 3.4–5)
ALP SERPL-CCNC: 71 U/L (ref 40–150)
ALT SERPL W P-5'-P-CCNC: 17 U/L (ref 0–50)
ANION GAP SERPL CALCULATED.3IONS-SCNC: 4 MMOL/L (ref 3–14)
AST SERPL W P-5'-P-CCNC: 12 U/L (ref 0–45)
BILIRUB SERPL-MCNC: 0.2 MG/DL (ref 0.2–1.3)
BUN SERPL-MCNC: 32 MG/DL (ref 7–30)
CALCIUM SERPL-MCNC: 8.3 MG/DL (ref 8.5–10.1)
CHLORIDE SERPL-SCNC: 109 MMOL/L (ref 94–109)
CHOLEST SERPL-MCNC: 172 MG/DL
CO2 SERPL-SCNC: 31 MMOL/L (ref 20–32)
CREAT SERPL-MCNC: 2.42 MG/DL (ref 0.52–1.04)
GFR SERPL CREATININE-BSD FRML MDRD: 20 ML/MIN/1.7M2
GLUCOSE SERPL-MCNC: 298 MG/DL (ref 70–99)
HDLC SERPL-MCNC: 46 MG/DL
LDLC SERPL CALC-MCNC: 68 MG/DL
NONHDLC SERPL-MCNC: 126 MG/DL
POTASSIUM SERPL-SCNC: 4.6 MMOL/L (ref 3.4–5.3)
PROT SERPL-MCNC: 6.8 G/DL (ref 6.8–8.8)
SODIUM SERPL-SCNC: 144 MMOL/L (ref 133–144)
TRIGL SERPL-MCNC: 288 MG/DL

## 2017-09-22 NOTE — TELEPHONE ENCOUNTER
Called patient, informed of provider message. Refer to result note 9/21/17.     Carla Field RN  [facility]

## 2017-10-02 ENCOUNTER — OFFICE VISIT (OUTPATIENT)
Dept: ORTHOPEDICS | Facility: CLINIC | Age: 67
End: 2017-10-02

## 2017-10-02 DIAGNOSIS — I73.9 PVD (PERIPHERAL VASCULAR DISEASE) (H): ICD-10-CM

## 2017-10-02 DIAGNOSIS — E11.40 TYPE 2 DIABETES MELLITUS WITH DIABETIC NEUROPATHY, WITH LONG-TERM CURRENT USE OF INSULIN (H): ICD-10-CM

## 2017-10-02 DIAGNOSIS — M21.961 DEFORMITY OF RIGHT FOOT: ICD-10-CM

## 2017-10-02 DIAGNOSIS — Z79.4 TYPE 2 DIABETES MELLITUS WITH DIABETIC NEUROPATHY, WITH LONG-TERM CURRENT USE OF INSULIN (H): ICD-10-CM

## 2017-10-02 DIAGNOSIS — B35.1 DERMATOPHYTOSIS OF NAIL: Primary | ICD-10-CM

## 2017-10-02 DIAGNOSIS — L84 TYLOMA: ICD-10-CM

## 2017-10-02 NOTE — NURSING NOTE
Reason For Visit:   Chief Complaint   Patient presents with     Consult     Diabetic foot Care.        Pain Assessment  Patient Currently in Pain: Denies             Current Outpatient Prescriptions   Medication Sig Dispense Refill     NOVOLOG FLEXPEN 100 UNIT/ML soln Take 6 units plus sliding scale before each meals 15 mL 11     insulin glargine (LANTUS SOLOSTAR) 100 UNIT/ML injection 70 units at bedtime 100 mL 3     DULoxetine (CYMBALTA) 30 MG EC capsule Take 1 capsule (30 mg) by mouth 2 times daily 180 capsule 3     tolterodine (DETROL LA) 2 MG 24 hr capsule TAKE 1 CAPSULE (2 MG) BY MOUTH DAILY 30 capsule 3     furosemide (LASIX) 80 MG tablet Take 1 tablet (80 mg) by mouth 2 times daily Patient taking 80mg in AM, 40mg in  tablet 3     blood glucose monitoring (ONE TOUCH ULTRA) test strip Use to test blood sugars 2 times daily or as directed. 200 strip 3     atorvastatin (LIPITOR) 20 MG tablet Take 1 tablet (20 mg) by mouth daily 90 tablet 3     carvedilol (COREG) 25 MG tablet Take 1 tablet (25 mg) by mouth 2 times daily (with meals) 180 tablet 3     amLODIPine (NORVASC) 10 MG tablet Take 1 tablet (10 mg) by mouth daily 90 tablet 3     lisinopril (PRINIVIL/ZESTRIL) 40 MG tablet Take 1 tablet (40 mg) by mouth 2 times daily 180 tablet 3     order for DME Equipment being ordered: TEDS stocking   Below the knee 15-20 mg  Dispense 2  Use daily 2 Device 1     albuterol (PROAIR HFA, PROVENTIL HFA, VENTOLIN HFA) 108 (90 BASE) MCG/ACT inhaler Inhale 2 puffs into the lungs every 6 hours as needed for shortness of breath / dyspnea or wheezing 3 Inhaler 1     insulin pen needle (ULTICARE MINI) 31G X 6 MM Use daily or as directed. 100 each prn     ORDER FOR DME Equipment being ordered: Compression stockings, 20-30 MMHG, knee high 1 Device 0     ASPIRIN NOT PRESCRIBED, INTENTIONAL, 1 each continuous prn Antiplatelet medication not prescribed intentionally due to current nosebleeds 0 each 0     MULTIVITAMIN OR 1 tablet by  mouth daily            Allergies   Allergen Reactions     Nkda [No Known Drug Allergies]
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
## 2017-10-02 NOTE — MR AVS SNAPSHOT
After Visit Summary   10/2/2017    Supriya Herr    MRN: [id number]           Patient Information     Date Of Birth          1950        Visit Information        Provider Department      10/2/2017 10:40 AM Eleazar Pack DPM [facility]        Today's Diagnoses     Dermatophytosis of nail    -  1    Deformity of right foot        Type 2 diabetes mellitus with diabetic neuropathy, with long-term current use of insulin (H)        PVD (peripheral vascular disease) (H)        Tyloma           Follow-ups after your visit        Additional Services     ORTHOTICS REFERRAL       **This referral order prints off in the [facility]  (Orthotics & Prosthetics) Central Scheduling Office**    The [facility] Central Scheduling Staff will contact the patient to schedule appointments.     Central Scheduling Contact Information: [phone] ([place])    Orthotics: Bilateral Shoe/s  Compression stockings    Please be aware that coverage of these services is subject to the terms and limitations of your health insurance plan.  Call member services at your health plan with any benefit or coverage questions.      Please bring the following to your appointment:    >>   Any x-rays, CTs or MRIs which have been performed.  Contact the facility where they were done to arrange for  prior to your scheduled appointment.    >>   List of current medications   >>   This referral request   >>   Any documents/labs given to you for this referral                  Your next 10 appointments already scheduled     Nov 16, 2017 11:00 AM CST   (Arrive by 10:45 AM)   RETURN DIABETES with [facility] ([facility])    [address] [place] [address]  3rd Floor  [facility] [address] [phone]              Who to contact     Please call your clinic at [phone] to:    Ask questions about your health    Make or cancel  appointments    Discuss your medicines    Learn about your test results    Speak to your doctor   If you have compliments or concerns about an experience at your clinic, or if you wish to file a complaint, please contact [facility] Patient Relations at [phone] or email us at [email or web address]         Additional Information About Your Visit        toucanBoxhart Information     [employer] is an electronic gateway that provides easy, online access to your medical records. With [employer], you can request a clinic appointment, read your test results, renew a prescription or communicate with your care team.     To sign up for [employer] visit the website at [email or web address]   You will be asked to enter the access code listed below, as well as some personal information. Please follow the directions to create your username and password.     Your access code is: [email or web address]  Expires: 12/10/2017  1:38 PM     Your access code will  in 90 days. If you need help or a new code, please contact your [facility] or call [phone] for assistance.        Care EveryWhere ID     This is your Care EveryWhere ID. This could be used by other organizations to access your [facility] medical records  [id number]         Blood Pressure from Last 3 Encounters:   17 137/61   17 130/76   17 144/72    Weight from Last 3 Encounters:   17 101.6 kg (224 lb)   17 100.2 kg (220 lb 14.4 oz)   17 101.6 kg (224 lb)              We Performed the Following     DEBRIDEMENT OF NAILS, 6 OR MORE     ORTHOTICS REFERRAL     TRIM HYPERKERATOTIC SKIN LESION,2-4        Primary Care Provider Office Phone # Fax #    Noam Jackson [phone]       [address] [place] [address]        Equal Access to Services     KALYANI WARREN AH: kristin Jane, madeline almodovar  sonal bond adama pittaan ah. So [facility] [phone].    ATENCIÓN: Si mariyala don, tiene a santoro disposición servicios gratuitos de asistencia lingüística. Renuka al [phone].    We comply with applicable federal civil rights laws and [place] laws. We do not discriminate on the basis of race, color, national origin, age, disability, sex, sexual orientation, or gender identity.            Thank you!     Thank you for choosing [facility]  for your care. Our goal is always to provide you with excellent care. Hearing back from our patients is one way we can continue to improve our services. Please take a few minutes to complete the written survey that you may receive in the mail after your visit with us. Thank you!             Your Updated Medication List - Protect others around you: Learn how to safely use, store and throw away your medicines at www.disposemymeds.org.          This list is accurate as of: 10/2/17 11:07 AM.  Always use your most recent med list.                   Brand Name Dispense Instructions for use Diagnosis    albuterol 108 (90 BASE) MCG/ACT Inhaler    PROAIR HFA/PROVENTIL HFA/VENTOLIN HFA    3 Inhaler    Inhale 2 puffs into the lungs every 6 hours as needed for shortness of breath / dyspnea or wheezing    Cough       amLODIPine 10 MG tablet    NORVASC    90 tablet    Take 1 tablet (10 mg) by mouth daily    Essential hypertension with goal blood pressure less than 140/90       ASPIRIN NOT PRESCRIBED    INTENTIONAL    0 each    1 each continuous prn Antiplatelet medication not prescribed intentionally due to current nosebleeds    Anemia due to blood loss, acute       atorvastatin 20 MG tablet    LIPITOR    90 tablet    Take 1 tablet (20 mg) by mouth daily    Hyperlipidemia LDL goal <100       blood glucose monitoring test strip    ONE TOUCH ULTRA    200 strip    Use to test blood sugars 2 times daily or as directed.    Type 2 diabetes mellitus with stage 3 chronic kidney  disease, without long-term current use of insulin (H)       carvedilol 25 MG tablet    COREG    180 tablet    Take 1 tablet (25 mg) by mouth 2 times daily (with meals)    Essential hypertension with goal blood pressure less than 140/90       DULoxetine 30 MG EC capsule    CYMBALTA    180 capsule    Take 1 capsule (30 mg) by mouth 2 times daily    Type 2 diabetes mellitus with diabetic nephropathy, with long-term current use of insulin (H), Diabetic polyneuropathy associated with diabetes mellitus due to underlying condition (H)       furosemide 80 MG tablet    LASIX    180 tablet    Take 1 tablet (80 mg) by mouth 2 times daily Patient taking 80mg in AM, 40mg in PM    Lymphedema of both lower extremities       insulin glargine 100 UNIT/ML injection    LANTUS SOLOSTAR    100 mL    70 units at bedtime    Type 2 diabetes mellitus with diabetic nephropathy, with long-term current use of insulin (H)       insulin pen needle 31G X 6 MM    ULTICARE MINI    100 each    Use daily or as directed.    Type 2 diabetes, HbA1c goal < 7% (H)       lisinopril 40 MG tablet    PRINIVIL/ZESTRIL    180 tablet    Take 1 tablet (40 mg) by mouth 2 times daily    Proteinuria       MULTIVITAMIN PO      1 tablet by mouth daily        NovoLOG FLEXPEN 100 UNIT/ML injection   Generic drug:  insulin aspart     15 mL    Take 6 units plus sliding scale before each meals    Diabetes mellitus, type 2 (H)       * order for DME     1 Device    Equipment being ordered: Compression stockings, 20-30 MMHG, knee high    Edema, Hypertension goal BP (blood pressure) < 140/90       * order for DME     2 Device    Equipment being ordered: TEDS stocking  Below the knee 15-20 mg Dispense 2 Use daily    Localized edema       tolterodine 2 MG 24 hr capsule    DETROL LA    30 capsule    TAKE 1 CAPSULE (2 MG) BY MOUTH DAILY    Urge incontinence of urine       * Notice:  This list has 2 medication(s) that are the same as other medications prescribed for you. Read the  directions carefully, and ask your doctor or other care provider to review them with you.

## 2017-10-06 ENCOUNTER — TELEPHONE (OUTPATIENT)
Dept: EDUCATION SERVICES | Facility: CLINIC | Age: 67
End: 2017-10-06

## 2017-10-06 NOTE — TELEPHONE ENCOUNTER
Phone call to Supriya to get blood sugar after starting Novolog.  She states they have been anywhere from 115 to 525.  She was unsure whether to take Novolog when she was 115 so she skipped her shot.  We talked about what to do in that situation.  She has not been writing the numbers down but she will start and then call me with them in a week. Tori York RN,CDE

## 2017-10-08 DIAGNOSIS — E78.5 HYPERLIPIDEMIA LDL GOAL <100: ICD-10-CM

## 2017-10-09 RX ORDER — ATORVASTATIN CALCIUM 20 MG/1
TABLET, FILM COATED ORAL
Qty: 90 TABLET | Refills: 3 | Status: ON HOLD | OUTPATIENT
Start: 2017-10-09 | End: 2017-11-02

## 2017-10-09 NOTE — TELEPHONE ENCOUNTER
Prescription approved per [facility] Refill Protocol.    Carla Field, BSN RN  [facility]

## 2017-10-09 NOTE — TELEPHONE ENCOUNTER
ATORVASTATIN 20 MG TABLET       Last Written Prescription Date: 2/14/17  Last Fill Quantity: 90, # refills: 3  Last Office Visit with G, P or [facility] prescribing provider: 9/21/17       Lab Results   Component Value Date    CHOL 172 09/21/2017     Lab Results   Component Value Date    HDL 46 09/21/2017     Lab Results   Component Value Date    LDL 68 09/21/2017     Lab Results   Component Value Date    TRIG 288 09/21/2017     Lab Results   Component Value Date    CHOLHDLRATIO 4.5 02/20/2015

## 2017-10-13 ENCOUNTER — TELEPHONE (OUTPATIENT)
Dept: EDUCATION SERVICES | Facility: CLINIC | Age: 67
End: 2017-10-13

## 2017-10-13 NOTE — TELEPHONE ENCOUNTER
Phone call to Supriya to get blood sugars.  Her blood sugar was 201 this morning.  She says her blood sugar is usually better than that in the morning.  She does not have other numbers to share with me.  Advised to write them down as she checks them because her insulin most likely needs to be adjusted.  We will talk next week. Tori York RN,CDE

## 2017-10-20 DIAGNOSIS — N39.41 URGE INCONTINENCE OF URINE: ICD-10-CM

## 2017-10-20 RX ORDER — TOLTERODINE 2 MG/1
CAPSULE, EXTENDED RELEASE ORAL
Qty: 60 CAPSULE | Refills: 3 | Status: SHIPPED | OUTPATIENT
Start: 2017-10-20 | End: 2018-03-29

## 2017-10-20 NOTE — TELEPHONE ENCOUNTER
Detrol      Last Written Prescription Date: 5/25/2017  Last Fill Quantity: 30,  # refills: 3  Last Office Visit with [facility], P or [facility] prescribing provider:    9/21/2017                                     Next 5 appointments (look out 90 days)     Jan 09, 2018 11:00 AM CST   Office Visit with Noam Jackson MD   [facility]    [address] [place] [address] [phone]                    Prescription approved per [facility] Refill Protocol.  Thanks! Jessi Erickson RN

## 2017-10-23 ENCOUNTER — TELEPHONE (OUTPATIENT)
Dept: FAMILY MEDICINE | Facility: CLINIC | Age: 67
End: 2017-10-23

## 2017-10-23 DIAGNOSIS — E11.22 TYPE 2 DIABETES MELLITUS WITH STAGE 3 CHRONIC KIDNEY DISEASE, WITHOUT LONG-TERM CURRENT USE OF INSULIN (H): ICD-10-CM

## 2017-10-23 DIAGNOSIS — S78.119D: Primary | ICD-10-CM

## 2017-10-23 DIAGNOSIS — N18.30 CKD (CHRONIC KIDNEY DISEASE) STAGE 3, GFR 30-59 ML/MIN (H): ICD-10-CM

## 2017-10-23 DIAGNOSIS — N18.30 TYPE 2 DIABETES MELLITUS WITH STAGE 3 CHRONIC KIDNEY DISEASE, WITHOUT LONG-TERM CURRENT USE OF INSULIN (H): ICD-10-CM

## 2017-10-23 NOTE — TELEPHONE ENCOUNTER
Dr. Jackson    See pt request    DME cued    Advise    Viridiana Sprague, SHANTA   [facility]

## 2017-10-23 NOTE — TELEPHONE ENCOUNTER
Reason for call:  Other   Patient called regarding (reason for call): prescription  Additional comments: Pt is requesting a written order for a new wheelchair. She stated that her current one is falling apart, as she has had it for over 5 years, and she needs a prescription written for one in order to have it covered. If she can't be reached at her number she stated her daugher, Caroline Gray, can be contacted at [phone].      Phone number to reach patient:  Home number on file [phone] (home)    Best Time:  Any    Can we leave a detailed message on this number?  YES

## 2017-10-23 NOTE — TELEPHONE ENCOUNTER
Spoke with pt. She said she would call us back regarding where she would like to get the wheelchair    DME script is at [facility]    Viridiana Sprague RN   [facility]

## 2017-10-24 NOTE — TELEPHONE ENCOUNTER
Script and last clinic visit notes faxed to [facility] per patient request    Carla Field, BSN RN  [facility]

## 2017-11-01 ENCOUNTER — HOSPITAL ENCOUNTER (INPATIENT)
Facility: CLINIC | Age: 67
LOS: 12 days | Discharge: HOME HEALTH | DRG: 603 | End: 2017-11-13
Attending: EMERGENCY MEDICINE | Admitting: FAMILY MEDICINE
Payer: MEDICARE

## 2017-11-01 ENCOUNTER — TELEPHONE (OUTPATIENT)
Dept: FAMILY MEDICINE | Facility: CLINIC | Age: 67
End: 2017-11-01

## 2017-11-01 ENCOUNTER — APPOINTMENT (OUTPATIENT)
Dept: CT IMAGING | Facility: CLINIC | Age: 67
DRG: 603 | End: 2017-11-01
Attending: FAMILY MEDICINE
Payer: MEDICARE

## 2017-11-01 ENCOUNTER — APPOINTMENT (OUTPATIENT)
Dept: ULTRASOUND IMAGING | Facility: CLINIC | Age: 67
DRG: 603 | End: 2017-11-01
Attending: FAMILY MEDICINE
Payer: MEDICARE

## 2017-11-01 DIAGNOSIS — Z79.4 TYPE 2 DIABETES MELLITUS WITH DIABETIC NEUROPATHY, WITH LONG-TERM CURRENT USE OF INSULIN (H): ICD-10-CM

## 2017-11-01 DIAGNOSIS — L80 VITILIGO: ICD-10-CM

## 2017-11-01 DIAGNOSIS — E11.40 TYPE 2 DIABETES MELLITUS WITH DIABETIC NEUROPATHY, WITH LONG-TERM CURRENT USE OF INSULIN (H): ICD-10-CM

## 2017-11-01 DIAGNOSIS — E66.01 MORBID OBESITY (H): ICD-10-CM

## 2017-11-01 DIAGNOSIS — Z72.0 TOBACCO ABUSE: ICD-10-CM

## 2017-11-01 DIAGNOSIS — E11.22 TYPE 2 DIABETES MELLITUS WITH STAGE 4 CHRONIC KIDNEY DISEASE, WITH LONG-TERM CURRENT USE OF INSULIN (H): ICD-10-CM

## 2017-11-01 DIAGNOSIS — G47.33 OSA (OBSTRUCTIVE SLEEP APNEA): ICD-10-CM

## 2017-11-01 DIAGNOSIS — Z79.4 TYPE 2 DIABETES MELLITUS WITH STAGE 4 CHRONIC KIDNEY DISEASE, WITH LONG-TERM CURRENT USE OF INSULIN (H): ICD-10-CM

## 2017-11-01 DIAGNOSIS — S78.119D: ICD-10-CM

## 2017-11-01 DIAGNOSIS — I10 HYPERTENSION GOAL BP (BLOOD PRESSURE) < 140/90: Primary | ICD-10-CM

## 2017-11-01 DIAGNOSIS — B36.9 FUNGAL DERMATITIS: ICD-10-CM

## 2017-11-01 DIAGNOSIS — I50.20 SYSTOLIC CONGESTIVE HEART FAILURE, UNSPECIFIED CONGESTIVE HEART FAILURE CHRONICITY: ICD-10-CM

## 2017-11-01 DIAGNOSIS — N18.4 CHRONIC KIDNEY DISEASE, STAGE 4 (SEVERE) (H): Chronic | ICD-10-CM

## 2017-11-01 DIAGNOSIS — N18.4 TYPE 2 DIABETES MELLITUS WITH STAGE 4 CHRONIC KIDNEY DISEASE, WITH LONG-TERM CURRENT USE OF INSULIN (H): ICD-10-CM

## 2017-11-01 DIAGNOSIS — R80.9 PROTEINURIA: ICD-10-CM

## 2017-11-01 DIAGNOSIS — R80.9 PROTEINURIA, UNSPECIFIED TYPE: ICD-10-CM

## 2017-11-01 DIAGNOSIS — L03.115 CELLULITIS OF RIGHT LEG: ICD-10-CM

## 2017-11-01 PROBLEM — L03.90 CELLULITIS: Status: ACTIVE | Noted: 2017-11-01

## 2017-11-01 LAB
ALBUMIN SERPL-MCNC: 1.9 G/DL (ref 3.4–5)
ALP SERPL-CCNC: 79 U/L (ref 40–150)
ALT SERPL W P-5'-P-CCNC: 15 U/L (ref 0–50)
ANION GAP SERPL CALCULATED.3IONS-SCNC: 9 MMOL/L (ref 3–14)
AST SERPL W P-5'-P-CCNC: 7 U/L (ref 0–45)
BASOPHILS # BLD AUTO: 0 10E9/L (ref 0–0.2)
BASOPHILS NFR BLD AUTO: 0.1 %
BILIRUB SERPL-MCNC: 0.3 MG/DL (ref 0.2–1.3)
BUN SERPL-MCNC: 38 MG/DL (ref 7–30)
CALCIUM SERPL-MCNC: 8.4 MG/DL (ref 8.5–10.1)
CHLORIDE SERPL-SCNC: 106 MMOL/L (ref 94–109)
CK SERPL-CCNC: 26 U/L (ref 30–225)
CO2 SERPL-SCNC: 24 MMOL/L (ref 20–32)
CREAT SERPL-MCNC: 2.82 MG/DL (ref 0.52–1.04)
CREAT SERPL-MCNC: 2.86 MG/DL (ref 0.52–1.04)
CRP SERPL-MCNC: 138 MG/L (ref 0–8)
DIFFERENTIAL METHOD BLD: ABNORMAL
EOSINOPHIL # BLD AUTO: 0.1 10E9/L (ref 0–0.7)
EOSINOPHIL NFR BLD AUTO: 0.4 %
ERYTHROCYTE [DISTWIDTH] IN BLOOD BY AUTOMATED COUNT: 13.5 % (ref 10–15)
ERYTHROCYTE [SEDIMENTATION RATE] IN BLOOD BY WESTERGREN METHOD: 119 MM/H (ref 0–30)
GFR SERPL CREATININE-BSD FRML MDRD: 16 ML/MIN/1.7M2
GFR SERPL CREATININE-BSD FRML MDRD: 17 ML/MIN/1.7M2
GLUCOSE BLDC GLUCOMTR-MCNC: 197 MG/DL (ref 70–99)
GLUCOSE BLDC GLUCOMTR-MCNC: 277 MG/DL (ref 70–99)
GLUCOSE SERPL-MCNC: 245 MG/DL (ref 70–99)
HCT VFR BLD AUTO: 28.7 % (ref 35–47)
HGB BLD-MCNC: 9.4 G/DL (ref 11.7–15.7)
IMM GRANULOCYTES # BLD: 0.1 10E9/L (ref 0–0.4)
IMM GRANULOCYTES NFR BLD: 0.4 %
LACTATE BLD-SCNC: 0.6 MMOL/L (ref 0.7–2)
LYMPHOCYTES # BLD AUTO: 1.3 10E9/L (ref 0.8–5.3)
LYMPHOCYTES NFR BLD AUTO: 7.4 %
MCH RBC QN AUTO: 28.3 PG (ref 26.5–33)
MCHC RBC AUTO-ENTMCNC: 32.8 G/DL (ref 31.5–36.5)
MCV RBC AUTO: 86 FL (ref 78–100)
MONOCYTES # BLD AUTO: 1 10E9/L (ref 0–1.3)
MONOCYTES NFR BLD AUTO: 5.4 %
NEUTROPHILS # BLD AUTO: 15.5 10E9/L (ref 1.6–8.3)
NEUTROPHILS NFR BLD AUTO: 86.3 %
NRBC # BLD AUTO: 0 10*3/UL
NRBC BLD AUTO-RTO: 0 /100
PLATELET # BLD AUTO: 251 10E9/L (ref 150–450)
PLATELET # BLD AUTO: 269 10E9/L (ref 150–450)
POTASSIUM SERPL-SCNC: 3.6 MMOL/L (ref 3.4–5.3)
PROT SERPL-MCNC: 7.2 G/DL (ref 6.8–8.8)
RBC # BLD AUTO: 3.32 10E12/L (ref 3.8–5.2)
SODIUM SERPL-SCNC: 139 MMOL/L (ref 133–144)
WBC # BLD AUTO: 18 10E9/L (ref 4–11)

## 2017-11-01 PROCEDURE — 96365 THER/PROPH/DIAG IV INF INIT: CPT | Performed by: FAMILY MEDICINE

## 2017-11-01 PROCEDURE — 82550 ASSAY OF CK (CPK): CPT | Performed by: FAMILY MEDICINE

## 2017-11-01 PROCEDURE — 25000128 H RX IP 250 OP 636: Performed by: FAMILY MEDICINE

## 2017-11-01 PROCEDURE — 82565 ASSAY OF CREATININE: CPT | Performed by: FAMILY MEDICINE

## 2017-11-01 PROCEDURE — 36415 COLL VENOUS BLD VENIPUNCTURE: CPT | Performed by: FAMILY MEDICINE

## 2017-11-01 PROCEDURE — 99285 EMERGENCY DEPT VISIT HI MDM: CPT | Mod: 25 | Performed by: FAMILY MEDICINE

## 2017-11-01 PROCEDURE — 85652 RBC SED RATE AUTOMATED: CPT | Performed by: FAMILY MEDICINE

## 2017-11-01 PROCEDURE — 73700 CT LOWER EXTREMITY W/O DYE: CPT | Mod: RT

## 2017-11-01 PROCEDURE — 96367 TX/PROPH/DG ADDL SEQ IV INF: CPT | Performed by: FAMILY MEDICINE

## 2017-11-01 PROCEDURE — A9270 NON-COVERED ITEM OR SERVICE: HCPCS | Mod: GY | Performed by: FAMILY MEDICINE

## 2017-11-01 PROCEDURE — 96375 TX/PRO/DX INJ NEW DRUG ADDON: CPT | Performed by: FAMILY MEDICINE

## 2017-11-01 PROCEDURE — 86140 C-REACTIVE PROTEIN: CPT | Performed by: FAMILY MEDICINE

## 2017-11-01 PROCEDURE — 99284 EMERGENCY DEPT VISIT MOD MDM: CPT | Mod: Z6 | Performed by: FAMILY MEDICINE

## 2017-11-01 PROCEDURE — 25000128 H RX IP 250 OP 636: Performed by: EMERGENCY MEDICINE

## 2017-11-01 PROCEDURE — 87040 BLOOD CULTURE FOR BACTERIA: CPT | Performed by: FAMILY MEDICINE

## 2017-11-01 PROCEDURE — 00000146 ZZHCL STATISTIC GLUCOSE BY METER IP

## 2017-11-01 PROCEDURE — 12000008 ZZH R&B INTERMEDIATE UMMC

## 2017-11-01 PROCEDURE — 85025 COMPLETE CBC W/AUTO DIFF WBC: CPT | Performed by: FAMILY MEDICINE

## 2017-11-01 PROCEDURE — 93971 EXTREMITY STUDY: CPT | Mod: RT

## 2017-11-01 PROCEDURE — 80053 COMPREHEN METABOLIC PANEL: CPT | Performed by: FAMILY MEDICINE

## 2017-11-01 PROCEDURE — 96366 THER/PROPH/DIAG IV INF ADDON: CPT | Performed by: FAMILY MEDICINE

## 2017-11-01 PROCEDURE — 25000131 ZZH RX MED GY IP 250 OP 636 PS 637: Mod: GY | Performed by: FAMILY MEDICINE

## 2017-11-01 PROCEDURE — 85049 AUTOMATED PLATELET COUNT: CPT | Performed by: FAMILY MEDICINE

## 2017-11-01 PROCEDURE — 25000128 H RX IP 250 OP 636

## 2017-11-01 PROCEDURE — 83605 ASSAY OF LACTIC ACID: CPT | Performed by: FAMILY MEDICINE

## 2017-11-01 PROCEDURE — 25000132 ZZH RX MED GY IP 250 OP 250 PS 637: Mod: GY | Performed by: FAMILY MEDICINE

## 2017-11-01 RX ORDER — ACETAMINOPHEN 325 MG/1
650 TABLET ORAL 4 TIMES DAILY
Status: DISCONTINUED | OUTPATIENT
Start: 2017-11-02 | End: 2017-11-13 | Stop reason: HOSPADM

## 2017-11-01 RX ORDER — FUROSEMIDE 20 MG
40 TABLET ORAL ONCE
Status: COMPLETED | OUTPATIENT
Start: 2017-11-02 | End: 2017-11-02

## 2017-11-01 RX ORDER — ONDANSETRON 2 MG/ML
4 INJECTION INTRAMUSCULAR; INTRAVENOUS ONCE
Status: COMPLETED | OUTPATIENT
Start: 2017-11-01 | End: 2017-11-01

## 2017-11-01 RX ORDER — CLINDAMYCIN PHOSPHATE 900 MG/50ML
900 INJECTION, SOLUTION INTRAVENOUS ONCE
Status: DISCONTINUED | OUTPATIENT
Start: 2017-11-01 | End: 2017-11-01 | Stop reason: CLARIF

## 2017-11-01 RX ORDER — SODIUM CHLORIDE 9 MG/ML
INJECTION, SOLUTION INTRAVENOUS
Status: COMPLETED
Start: 2017-11-01 | End: 2017-11-01

## 2017-11-01 RX ORDER — OXYCODONE HYDROCHLORIDE 5 MG/1
5 TABLET ORAL EVERY 6 HOURS PRN
Status: DISCONTINUED | OUTPATIENT
Start: 2017-11-01 | End: 2017-11-05

## 2017-11-01 RX ORDER — HEPARIN SODIUM 5000 [USP'U]/.5ML
5000 INJECTION, SOLUTION INTRAVENOUS; SUBCUTANEOUS EVERY 12 HOURS
Status: DISCONTINUED | OUTPATIENT
Start: 2017-11-01 | End: 2017-11-02

## 2017-11-01 RX ORDER — CARVEDILOL 6.25 MG/1
25 TABLET ORAL 2 TIMES DAILY WITH MEALS
Status: DISCONTINUED | OUTPATIENT
Start: 2017-11-01 | End: 2017-11-13 | Stop reason: HOSPADM

## 2017-11-01 RX ORDER — POLYETHYLENE GLYCOL 3350 17 G/17G
17 POWDER, FOR SOLUTION ORAL DAILY PRN
Status: DISCONTINUED | OUTPATIENT
Start: 2017-11-01 | End: 2017-11-13 | Stop reason: HOSPADM

## 2017-11-01 RX ORDER — ONDANSETRON 2 MG/ML
4 INJECTION INTRAMUSCULAR; INTRAVENOUS EVERY 6 HOURS PRN
Status: DISCONTINUED | OUTPATIENT
Start: 2017-11-01 | End: 2017-11-13 | Stop reason: HOSPADM

## 2017-11-01 RX ORDER — DEXTROSE MONOHYDRATE 25 G/50ML
25-50 INJECTION, SOLUTION INTRAVENOUS
Status: DISCONTINUED | OUTPATIENT
Start: 2017-11-01 | End: 2017-11-13 | Stop reason: HOSPADM

## 2017-11-01 RX ORDER — FUROSEMIDE 20 MG
40 TABLET ORAL ONCE
Status: DISCONTINUED | OUTPATIENT
Start: 2017-11-01 | End: 2017-11-01

## 2017-11-01 RX ORDER — DULOXETIN HYDROCHLORIDE 30 MG/1
30 CAPSULE, DELAYED RELEASE ORAL 2 TIMES DAILY
Status: DISCONTINUED | OUTPATIENT
Start: 2017-11-01 | End: 2017-11-13 | Stop reason: HOSPADM

## 2017-11-01 RX ORDER — ATORVASTATIN CALCIUM 20 MG/1
20 TABLET, FILM COATED ORAL DAILY
Status: DISCONTINUED | OUTPATIENT
Start: 2017-11-02 | End: 2017-11-13 | Stop reason: HOSPADM

## 2017-11-01 RX ORDER — PIPERACILLIN SODIUM, TAZOBACTAM SODIUM 3; .375 G/15ML; G/15ML
3.38 INJECTION, POWDER, LYOPHILIZED, FOR SOLUTION INTRAVENOUS ONCE
Status: COMPLETED | OUTPATIENT
Start: 2017-11-01 | End: 2017-11-01

## 2017-11-01 RX ORDER — ONDANSETRON 4 MG/1
4 TABLET, ORALLY DISINTEGRATING ORAL EVERY 6 HOURS PRN
Status: DISCONTINUED | OUTPATIENT
Start: 2017-11-01 | End: 2017-11-13 | Stop reason: HOSPADM

## 2017-11-01 RX ORDER — SODIUM CHLORIDE 9 MG/ML
INJECTION, SOLUTION INTRAVENOUS CONTINUOUS
Status: DISCONTINUED | OUTPATIENT
Start: 2017-11-01 | End: 2017-11-03

## 2017-11-01 RX ORDER — ACETAMINOPHEN 325 MG/1
650 TABLET ORAL EVERY 4 HOURS PRN
Status: DISCONTINUED | OUTPATIENT
Start: 2017-11-01 | End: 2017-11-01

## 2017-11-01 RX ORDER — ALBUTEROL SULFATE 90 UG/1
2 AEROSOL, METERED RESPIRATORY (INHALATION) EVERY 6 HOURS PRN
Status: DISCONTINUED | OUTPATIENT
Start: 2017-11-01 | End: 2017-11-13 | Stop reason: HOSPADM

## 2017-11-01 RX ORDER — NICOTINE POLACRILEX 4 MG
15-30 LOZENGE BUCCAL
Status: DISCONTINUED | OUTPATIENT
Start: 2017-11-01 | End: 2017-11-13 | Stop reason: HOSPADM

## 2017-11-01 RX ORDER — PIPERACILLIN SODIUM, TAZOBACTAM SODIUM 2; .25 G/10ML; G/10ML
2.25 INJECTION, POWDER, LYOPHILIZED, FOR SOLUTION INTRAVENOUS EVERY 6 HOURS
Status: DISCONTINUED | OUTPATIENT
Start: 2017-11-01 | End: 2017-11-03

## 2017-11-01 RX ORDER — AMLODIPINE BESYLATE 10 MG/1
10 TABLET ORAL DAILY
Status: DISCONTINUED | OUTPATIENT
Start: 2017-11-02 | End: 2017-11-04

## 2017-11-01 RX ORDER — NALOXONE HYDROCHLORIDE 0.4 MG/ML
.1-.4 INJECTION, SOLUTION INTRAMUSCULAR; INTRAVENOUS; SUBCUTANEOUS
Status: DISCONTINUED | OUTPATIENT
Start: 2017-11-01 | End: 2017-11-13 | Stop reason: HOSPADM

## 2017-11-01 RX ADMIN — INSULIN GLARGINE 35 UNITS: 100 INJECTION, SOLUTION SUBCUTANEOUS at 22:10

## 2017-11-01 RX ADMIN — ACETAMINOPHEN 650 MG: 325 TABLET, FILM COATED ORAL at 22:05

## 2017-11-01 RX ADMIN — VANCOMYCIN HYDROCHLORIDE 1500 MG: 10 INJECTION, POWDER, LYOPHILIZED, FOR SOLUTION INTRAVENOUS at 14:41

## 2017-11-01 RX ADMIN — SODIUM CHLORIDE 1000 ML: 9 INJECTION, SOLUTION INTRAVENOUS at 14:44

## 2017-11-01 RX ADMIN — PIPERACILLIN SODIUM,TAZOBACTAM SODIUM 3.38 G: 3; .375 INJECTION, POWDER, FOR SOLUTION INTRAVENOUS at 17:04

## 2017-11-01 RX ADMIN — ONDANSETRON 4 MG: 2 INJECTION INTRAMUSCULAR; INTRAVENOUS at 17:18

## 2017-11-01 RX ADMIN — CARVEDILOL 25 MG: 6.25 TABLET, FILM COATED ORAL at 22:05

## 2017-11-01 RX ADMIN — DULOXETINE HYDROCHLORIDE 30 MG: 30 CAPSULE, DELAYED RELEASE ORAL at 22:05

## 2017-11-01 ASSESSMENT — PAIN DESCRIPTION - DESCRIPTORS: DESCRIPTORS: THROBBING;TENDER

## 2017-11-01 NOTE — IP AVS SNAPSHOT
[address] [address]    Phone:  [phone]                                       After Visit Summary   11/1/2017    Supriya Herr    MRN: [id number]           After Visit Summary Signature Page     I have received my discharge instructions, and my questions have been answered. I have discussed any challenges I see with this plan with the nurse or doctor.    ..........................................................................................................................................  Patient/Patient Representative Signature      ..........................................................................................................................................  Patient Representative Print Name and Relationship to Patient    ..................................................               ................................................  Date                                            Time    ..........................................................................................................................................  Reviewed by Signature/Title    ...................................................              ..............................................  Date                                                            Time

## 2017-11-01 NOTE — LETTER
Transition Communication Hand-off for Care Transitions to Next Level of Care Provider    Name: Supriya Herr  MRN #: [id number]  Primary Care Provider: Noam Jackson     Primary Clinic: [address] [place] [address]     Reason for Hospitalization:  Cellulitis of right leg [L03.115]    Admit Date/Time: 11/1/2017  1:53 PM  Discharge Date: 11/13/2017    Payor Source: Payor: MEDICARE / Plan: MEDICARE / Product Type: Medicare /     Discharge Needs Assessment:  Needs       Most Recent Value    Equipment Currently Used at Home wheelchair, manual, wheelchair, power, shower chair    Transportation Available family or friend will provide          Follow-up plan:  Future Appointments  Date Time Provider Department Center   11/16/2017 11:00 AM Arabella Kamara PA-C [facility]   11/20/2017 1:00 PM Noam Jackson MD RD[facility]   12/13/2017 2:00 PM  LAB [facility]CL[facility]   12/13/2017 2:50 PM Kathryn Basilio MD [facility]   1/9/2018 10:30 AM RD LAB RDLAB RDFP   1/9/2018 11:00 AM Noam Jackson MD RDFP RDFP          Referrals     Future Labs/Procedures    Home care nursing referral     Comments:    [facility]  Phone  [phone]  Fax  [phone]    RN skilled nursing visit.   RN to assess respiratory and cardiac status, pain level and activity tolerance, wound for increased signs/symptoms of infection, hydration, nutrition and bowel status and home safety.  RN to provide and teach medication management and set-up every week.  RN to provide and teach wound care 2-3 times per week.    OT to provide lymphedema treatment 2-3 times per week    Your provider has ordered home care nursing services.   If you have not been contacted within 2 days of your discharge please call the inpatient department phone number at [phone] .            Mchugh Recommendations:  Please review TRAN DE LEÓNN RN PHN  Patient Care Management Coordinator  [place]  5, and [facility] 5  Phone: [phone] / Pager: [phone]      AVS/Discharge Summary is the source of truth; this is a helpful guide for improved communication of patient story

## 2017-11-01 NOTE — PHARMACY-VANCOMYCIN DOSING SERVICE
Pharmacy Vancomycin Initial Note  Date of Service 2017  Patient's  1950  67 year old, female    Indication: Skin and Soft Tissue Infection (R leg swelling, pt also experienced chills and she believes a low-grade fever)    Current estimated CrCl = Estimated Creatinine Clearance: 26.8 mL/min (based on Cr of 2.42).  17 sCr = 2.42; baseline is ~1.5    Creatinine for last 3 days: pending    Recent Vancomycin Level(s) for last 3 days  No results found for requested labs within last 72 hours.      Vancomycin IV Administrations (past 72 hours)      No vancomycin orders with administrations in past 72 hours.                Nephrotoxins and other renal medications (Future)    Start     Dose/Rate Route Frequency Ordered Stop    17 1430  vancomycin (VANCOCIN) 1,500 mg in NaCl 0.9 % 250 mL intermittent infusion      1,500 mg  over 90 Minutes Intravenous EVERY 24 HOURS 17 1421            Contrast Orders - past 72 hours     None              Plan:  1.  Start vancomycin  1500 mg IV q24h (~15 mg/kg). Will dose every 24 hours given CKD (sCr pending at this time, baseline is ~1.5 mg/dL).    2.  Goal Trough Level: 10-15 mg/L for SSTI. No history MRSA, low grade fever, blood culture pending.  3.  Pharmacy will check trough levels as appropriate in 1-3 Days.    4. Serum creatinine levels will be ordered daily for the first week of therapy and at least twice weekly for subsequent weeks.    5. [place] method utilized to dose vancomycin therapy: Method 1 and Method 2        Cecy Jeffrey, PharmD

## 2017-11-01 NOTE — IP AVS SNAPSHOT
MRN:[id number]                      After Visit Summary   11/1/2017    Supriya Herr    MRN: [id number]           Thank you!     Thank you for choosing [facility] for your care. Our goal is always to provide you with excellent care. Hearing back from our patients is one way we can continue to improve our services. Please take a few minutes to complete the written survey that you may receive in the mail after you visit with us. Thank you!        Patient Information     Date Of Birth          1950        Designated Caregiver       Most Recent Value    Caregiver    Will someone help with your care after discharge? yes    Name of designated caregiver Caroline- daughter    Phone number of caregiver [phone]    Caregiver address Same as pt      About your hospital stay     You were admitted on:  November 1, 2017 You last received care in the:  [address] [facility] [facility]    You were discharged on:  November 13, 2017        Reason for your hospital stay       You were hospitalized for right thigh cellulitis.  You were treated with IV antibiotics and your infection was very slow to respond.  Gradually with control of the swelling your cellulitis was improving and you were transitioned to oral antibiotics.  Your insulin regimen was also adjusted significantly as were your blood pressure medications.                  Who to Call     For medical emergencies, please call 911.  For non-urgent questions about your medical care, please call your primary care provider or clinic, [phone]          Attending Provider     Provider Specialty    Curyl Mireles MD Emergency Medicine    Dusty Armijo MD Family Practice    Shyam Barcenas MD Family Medicine - Sports Medicine    Demetria Juan DO Family Practice    Chrissie, Rigo Cobb MD Family Practice       Primary Care Provider Office Phone # Fax #    Noam Jackson [phone]       When to contact your care team       Call your  primary doctor if you have any of the following: temperature greater than 101.4, increased swelling, increased pain or increased redness.                  After Care Instructions     Activity       Your activity upon discharge: activity as tolerated            Diet       Follow this diet upon discharge:         Combination Diet 9916-7223 Calories: Moderate Consistent CHO (4-6 CHO units/meal)            Wound care and dressings       Instructions to care for your wound at home: Lymphedema wrap right lower extremity.                  Follow-up Appointments     Follow Up and recommended labs and tests       Follow up with primary care provider, Noam Jackson, within 7 days to evaluate medication change and for hospital follow- up.  The following labs/tests are recommended: BMP, magnesium, phosphorus.      Follow up with nephrology in 2-4 weeks    Follow up with endocrinology in 2-4 weeks                  Your next 10 appointments already scheduled     Nov 16, 2017 11:00 AM CST   (Arrive by 10:45 AM)   RETURN DIABETES with Arabella Kamara PA-C   [facility] ([facility])    [address] [place] [address]  [address] [facility] [id number] [phone]            Nov 20, 2017  1:00 PM CST   Office Visit with Noam Jackson MD   [facility] ([facility])    [address] [place] [id number] [phone]           Bring a current list of meds and any records pertaining to this visit. For Physicals, please bring immunization records and any forms needing to be filled out. Please arrive 10 minutes early to complete paperwork.            Dec 13, 2017  2:00 PM CST   Lab with  LAB   [facility] Lab ([facility])    [address] [place] [id number] [phone]            Dec 13, 2017  2:50 PM CST   (Arrive by 2:20 PM)   Return Visit with Kathryn Basilio MD    [facility] ([facility])    [address]  3rd Floor  [facility] [id number] [phone]            Jan 09, 2018 10:30 AM CST   LAB with RD LAB   [facility] ([facility])    [address] [place] [id number] [phone]           Please do not eat 10-12 hours before your appointment if you are coming in fasting for labs on lipids, cholesterol, or glucose (sugar). This does not apply to pregnant women. Water, hot tea and black coffee (with nothing added) are okay. Do not drink other fluids, diet soda or chew gum.            Jan 09, 2018 11:00 AM CST   Office Visit with Noam Jackson MD   [facility] ([facility])    [address] [place] [address] [phone]           Bring a current list of meds and any records pertaining to this visit. For Physicals, please bring immunization records and any forms needing to be filled out. Please arrive 10 minutes early to complete paperwork.              Additional Services     Home care nursing referral       [facility]  Phone  [phone]  Fax  [phone]    RN skilled nursing visit.   RN to assess respiratory and cardiac status, pain level and activity tolerance, wound for increased signs/symptoms of infection, hydration, nutrition and bowel status and home safety.  RN to provide and teach medication management and set-up every week.  RN to provide and teach wound care 2-3 times per week.    OT to provide lymphedema treatment 2-3 times per week    Your provider has ordered home care nursing services.   If you have not been contacted within 2 days of your discharge please call the inpatient department phone number at [phone] .                  Pending Results     Date and Time Order Name Status Description    11/9/2017 1845 Vancomycin level In process             Statement of Approval   "   Ordered          17 1218  I have reviewed and agree with all the recommendations and orders detailed in this document.  EFFECTIVE NOW     Approved and electronically signed by:  Brayden Carrasco MD             Admission Information     Date & Time Provider Department Dept. Phone    2017 Rigo Dickens MD [address] [facility] [phone]      Your Vitals Were     Blood Pressure Pulse Temperature Respirations Height Weight    129/41 88 97.8  F (36.6  C) (Oral) 16 1.67 m (5' 5.75\") 100.7 kg (222 lb 1.6 oz)    Pulse Oximetry BMI (Body Mass Index)                95% 36.12 kg/m2          MyChart Information     [employer] lets you send messages to your doctor, view your test results, renew your prescriptions, schedule appointments and more. To sign up, go to [email or web address] . Click on \"Log in\" on the left side of the screen, which will take you to the Welcome page. Then click on \"Sign up Now\" on the right side of the page.     You will be asked to enter the access code listed below, as well as some personal information. Please follow the directions to create your username and password.     Your access code is: [email or web address]  Expires: 12/10/2017 12:38 PM     Your access code will  in 90 days. If you need help or a new code, please call your [facility] clinic or [phone].        Care EveryWhere ID     This is your Care EveryWhere ID. This could be used by other organizations to access your [facility] medical records  [id number]        Equal Access to Services     [facility] : Hadii danisha lauren Soaleksandar, waaxda luqadaha, qaybta kaalmamadeline augustine . So [facility] [phone].    ATENCIÓN: Si habla español, tiene a santoro disposición servicios gratuitos de asistencia lingüística. Llame al [phone].    We comply with applicable federal civil rights laws and [place] laws. We do not discriminate on the basis of race, color, national origin, age, " disability, sex, sexual orientation, or gender identity.               Review of your medicines      START taking        Dose / Directions    acetaminophen 325 MG tablet   Commonly known as:  TYLENOL        Dose:  650 mg   Take 2 tablets (650 mg) by mouth 4 times daily   Quantity:  100 tablet   Refills:  0       doxycycline 100 MG tablet   Commonly known as:  VIBRA-TABS        Dose:  100 mg   Start taking on:  11/14/2017   Take 1 tablet (100 mg) by mouth 2 times daily   Quantity:  28 tablet   Refills:  0       hydrALAZINE 100 MG Tabs tablet   Commonly known as:  APRESOLINE   Used for:  Hypertension goal BP (blood pressure) < 140/90        Dose:  100 mg   Take 1 tablet (100 mg) by mouth every 8 hours   Quantity:  180 tablet   Refills:  1       lactobacillus rhamnosus (GG) capsule        Dose:  1 capsule   Take 1 capsule by mouth 2 times daily   Quantity:  60 capsule   Refills:  0       miconazole 2 % powder   Commonly known as:  MICATIN; MICRO GUARD   Used for:  Fungal dermatitis        Apply topically 2 times daily   Quantity:  71 g   Refills:  0       spironolactone 25 MG tablet   Commonly known as:  ALDACTONE   Used for:  Hypertension goal BP (blood pressure) < 140/90        Dose:  25 mg   Take 1 tablet (25 mg) by mouth daily   Quantity:  60 tablet   Refills:  1         CONTINUE these medicines which may have CHANGED, or have new prescriptions. If we are uncertain of the size of tablets/capsules you have at home, strength may be listed as something that might have changed.        Dose / Directions    insulin glargine 100 UNIT/ML injection   Commonly known as:  LANTUS   This may have changed:    - how much to take  - how to take this  - when to take this  - additional instructions   Used for:  Type 2 diabetes mellitus with diabetic neuropathy, with long-term current use of insulin (H)        Dose:  20 Units   Inject 20 Units Subcutaneous At Bedtime   Quantity:  3 mL   Refills:  1       lisinopril 40 MG tablet    Commonly known as:  PRINIVIL/ZESTRIL   This may have changed:  when to take this   Used for:  Hypertension goal BP (blood pressure) < 140/90        Dose:  40 mg   Take 1 tablet (40 mg) by mouth daily   Quantity:  60 tablet   Refills:  1         CONTINUE these medicines which have NOT CHANGED        Dose / Directions    albuterol 108 (90 BASE) MCG/ACT Inhaler   Commonly known as:  PROAIR HFA/PROVENTIL HFA/VENTOLIN HFA   Used for:  Cough        Dose:  2 puff   Inhale 2 puffs into the lungs every 6 hours as needed for shortness of breath / dyspnea or wheezing   Quantity:  3 Inhaler   Refills:  1       amLODIPine 10 MG tablet   Commonly known as:  NORVASC   Used for:  Essential hypertension with goal blood pressure less than 140/90        Dose:  10 mg   Take 1 tablet (10 mg) by mouth daily   Quantity:  90 tablet   Refills:  3       ASPIRIN NOT PRESCRIBED   Commonly known as:  INTENTIONAL   Used for:  Anemia due to blood loss, acute        Dose:  1 each   1 each continuous prn Antiplatelet medication not prescribed intentionally due to current nosebleeds   Quantity:  0 each   Refills:  0       atorvastatin 20 MG tablet   Commonly known as:  LIPITOR   Used for:  Hyperlipidemia LDL goal <100        Dose:  20 mg   Take 1 tablet (20 mg) by mouth daily   Quantity:  90 tablet   Refills:  3       blood glucose monitoring test strip   Commonly known as:  ONETOUCH ULTRA   Used for:  Type 2 diabetes mellitus with stage 3 chronic kidney disease, without long-term current use of insulin (H)        Use to test blood sugars 2 times daily or as directed.   Quantity:  200 strip   Refills:  3       carvedilol 25 MG tablet   Commonly known as:  COREG   Used for:  Essential hypertension with goal blood pressure less than 140/90        Dose:  25 mg   Take 1 tablet (25 mg) by mouth 2 times daily (with meals)   Quantity:  180 tablet   Refills:  3       DULoxetine 30 MG EC capsule   Commonly known as:  CYMBALTA   Used for:  Type 2 diabetes  mellitus with diabetic nephropathy, with long-term current use of insulin (H), Diabetic polyneuropathy associated with diabetes mellitus due to underlying condition (H)        Dose:  30 mg   Take 1 capsule (30 mg) by mouth 2 times daily   Quantity:  180 capsule   Refills:  3       furosemide 80 MG tablet   Commonly known as:  LASIX   Used for:  Lymphedema of both lower extremities        Dose:  80 mg   Take 1 tablet (80 mg) by mouth 2 times daily Patient taking 80mg in AM, 40mg in PM   Quantity:  180 tablet   Refills:  3       insulin pen needle 31G X 6 MM   Commonly known as:  ULTICARE MINI   Used for:  Type 2 diabetes, HbA1c goal < 7% (H)        Use daily or as directed.   Quantity:  100 each   Refills:  prn       MULTIVITAMIN PO        1 tablet by mouth daily   Refills:  0       * order for DME   Used for:  Edema, Hypertension goal BP (blood pressure) < 140/90        Equipment being ordered: Compression stockings, 20-30 MMHG, knee high   Quantity:  1 Device   Refills:  0       * order for DME   Used for:  Localized edema        Equipment being ordered: TEDS stocking  Below the knee 15-20 mg Dispense 2 Use daily   Quantity:  2 Device   Refills:  1       * order for DME   Used for:  CKD (chronic kidney disease) stage 3, GFR 30-59 ml/min, Type 2 diabetes mellitus with stage 3 chronic kidney disease, without long-term current use of insulin (H)        1 wheelchair   Quantity:  1 Device   Refills:  0       tolterodine 2 MG 24 hr capsule   Commonly known as:  DETROL LA   Used for:  Urge incontinence of urine        TAKE 1 CAPSULE (2 MG) BY MOUTH DAILY   Quantity:  60 capsule   Refills:  3       * Notice:  This list has 3 medication(s) that are the same as other medications prescribed for you. Read the directions carefully, and ask your doctor or other care provider to review them with you.      STOP taking     NovoLOG FLEXPEN 100 UNIT/ML injection   Generic drug:  insulin aspart                Where to get your  medicines      These medications were sent to [facility] - [place], [place] - [address]  [address], [facility] [address]     Phone:  [phone]     doxycycline 100 MG tablet    hydrALAZINE 100 MG Tabs tablet    insulin glargine 100 UNIT/ML injection    lisinopril 40 MG tablet    miconazole 2 % powder    spironolactone 25 MG tablet         Some of these will need a paper prescription and others can be bought over the counter. Ask your nurse if you have questions.     Bring a paper prescription for each of these medications     lactobacillus rhamnosus (GG) capsule       You don't need a prescription for these medications     acetaminophen 325 MG tablet               ANTIBIOTIC INSTRUCTION     You've Been Prescribed an Antibiotic - Now What?  Your healthcare team thinks that you or your loved one might have an infection. Some infections can be treated with antibiotics, which are powerful, life-saving drugs. Like all medications, antibiotics have side effects and should only be used when necessary. There are some important things you should know about your antibiotic treatment.      Your healthcare team may run tests before you start taking an antibiotic.    Your team may take samples (e.g., from your blood, urine or other areas) to run tests to look for bacteria. These test can be important to determine if you need an antibiotic at all and, if you do, which antibiotic will work best.      Within a few days, your healthcare team might change or even stop your antibiotic.    Your team may start you on an antibiotic while they are working to find out what is making you sick.    Your team might change your antibiotic because test results show that a different antibiotic would be better to treat your infection.    In some cases, once your team has more information, they learn that you do not need an antibiotic at all. They may find out that you don't have an infection, or that the  antibiotic you're taking won't work against your infection. For example, an infection caused by a virus can't be treated with antibiotics. Staying on an antibiotic when you don't need it is more likely to be harmful than helpful.      You may experience side effects from your antibiotic.    Like all medications, antibiotics have side effects. Some of these can be serious.    Let you healthcare team know if you have any known allergies when you are admitted to the hospital.    One significant side effect of nearly all antibiotics is the risk of severe and sometimes deadly diarrhea caused by Clostridium difficile (C. Difficile). This occurs when a person takes antibiotics because some good germs are destroyed. Antibiotic use allows C. diificile to take over, putting patients at high risk for this serious infection.    As a patient or caregiver, it is important to understand your or your loved one's antibiotic treatment. It is especially important for caregivers to speak up when patients can't speak for themselves. Here are some important questions to ask your healthcare team.    What infection is this antibiotic treating and how do you know I have that infection?    What side effects might occur from this antibiotic?    How long will I need to take this antibiotic?    Is it safe to take this antibiotic with other medications or supplements (e.g., vitamins) that I am taking?     Are there any special directions I need to know about taking this antibiotic? For example, should I take it with food?    How will I be monitored to know whether my infection is responding to the antibiotic?    What tests may help to make sure the right antibiotic is prescribed for me?      Information provided by:  www.cdc.gov/getsmart  U.S. Department of Health and Human Services  Centers for disease Control and Prevention  National Center for Emerging and Zoonotic Infectious Diseases  Division of Healthcare Quality Promotion         Protect  others around you: Learn how to safely use, store and throw away your medicines at www.disposemymeds.org.             Medication List: This is a list of all your medications and when to take them. Check marks below indicate your daily home schedule. Keep this list as a reference.      Medications           Morning Afternoon Evening Bedtime As Needed    acetaminophen 325 MG tablet   Commonly known as:  TYLENOL   Take 2 tablets (650 mg) by mouth 4 times daily   Last time this was given:  650 mg on 11/13/2017 12:30 PM                                albuterol 108 (90 BASE) MCG/ACT Inhaler   Commonly known as:  PROAIR HFA/PROVENTIL HFA/VENTOLIN HFA   Inhale 2 puffs into the lungs every 6 hours as needed for shortness of breath / dyspnea or wheezing   Last time this was given:  2 puffs on 11/5/2017  2:36 PM                                amLODIPine 10 MG tablet   Commonly known as:  NORVASC   Take 1 tablet (10 mg) by mouth daily   Last time this was given:  10 mg on 11/13/2017  8:28 AM                                ASPIRIN NOT PRESCRIBED   Commonly known as:  INTENTIONAL   1 each continuous prn Antiplatelet medication not prescribed intentionally due to current nosebleeds                                atorvastatin 20 MG tablet   Commonly known as:  LIPITOR   Take 1 tablet (20 mg) by mouth daily   Last time this was given:  20 mg on 11/13/2017  8:28 AM                                blood glucose monitoring test strip   Commonly known as:  ONETOUCH ULTRA   Use to test blood sugars 2 times daily or as directed.                                carvedilol 25 MG tablet   Commonly known as:  COREG   Take 1 tablet (25 mg) by mouth 2 times daily (with meals)   Last time this was given:  25 mg on 11/13/2017  9:16 AM                                doxycycline 100 MG tablet   Commonly known as:  VIBRA-TABS   Take 1 tablet (100 mg) by mouth 2 times daily   Start taking on:  11/14/2017                                DULoxetine 30 MG  EC capsule   Commonly known as:  CYMBALTA   Take 1 capsule (30 mg) by mouth 2 times daily   Last time this was given:  30 mg on 11/13/2017  8:28 AM                                furosemide 80 MG tablet   Commonly known as:  LASIX   Take 1 tablet (80 mg) by mouth 2 times daily Patient taking 80mg in AM, 40mg in PM   Last time this was given:  80 mg on 11/13/2017  8:28 AM                                hydrALAZINE 100 MG Tabs tablet   Commonly known as:  APRESOLINE   Take 1 tablet (100 mg) by mouth every 8 hours   Last time this was given:  100 mg on 11/13/2017  8:28 AM                                insulin glargine 100 UNIT/ML injection   Commonly known as:  LANTUS   Inject 20 Units Subcutaneous At Bedtime   Last time this was given:  20 Units on 11/12/2017 11:10 PM                                insulin pen needle 31G X 6 MM   Commonly known as:  ULTICARE MINI   Use daily or as directed.                                lactobacillus rhamnosus (GG) capsule   Take 1 capsule by mouth 2 times daily   Last time this was given:  1 capsule on 11/13/2017  8:28 AM                                lisinopril 40 MG tablet   Commonly known as:  PRINIVIL/ZESTRIL   Take 1 tablet (40 mg) by mouth daily   Last time this was given:  40 mg on 11/13/2017  8:28 AM                                miconazole 2 % powder   Commonly known as:  MICATIN; MICRO GUARD   Apply topically 2 times daily   Last time this was given:  11/12/2017 11:02 PM                                MULTIVITAMIN PO   1 tablet by mouth daily                                * order for DME   Equipment being ordered: Compression stockings, 20-30 MMHG, knee high                                * order for DME   Equipment being ordered: TEDS stocking  Below the knee 15-20 mg Dispense 2 Use daily                                * order for DME   1 wheelchair                                spironolactone 25 MG tablet   Commonly known as:  ALDACTONE   Take 1 tablet (25 mg) by  mouth daily   Last time this was given:  25 mg on 11/13/2017  8:28 AM                                tolterodine 2 MG 24 hr capsule   Commonly known as:  DETROL LA   TAKE 1 CAPSULE (2 MG) BY MOUTH DAILY                                * Notice:  This list has 3 medication(s) that are the same as other medications prescribed for you. Read the directions carefully, and ask your doctor or other care provider to review them with you.

## 2017-11-01 NOTE — TELEPHONE ENCOUNTER
Pt's sister, Caroline called and states that pt's leg is swollen and is weeping fluid. The front of her leg is always red and shiny due to the lymphedema.Thinks this started 1.5 weeks ago. Pt has an rx for a compression stocking, but is not able to get to the facility to get this. Is not with the pt right now.     Spoke with pt. All of a sudden entire top of leg swelled. Is painful. Above her knee. No redness on her leg that she can see, has it wrapped up. Leg feels warmer than normal. No fever, but does have the chills. Fever started 1 week ago. Has had the flu. Feels nauseated. Has had clear drainage from her leg. Puddles on the floor. No pus. Right now it itches so it must be healing. No SOB or chest pain. Heart flutters a little bit and this started yesterday. Recommended ER for evaluation ASAP today. Called and left message for her sister, Caroline with this information as well.     Leslie Ball RN  [facility] [place]

## 2017-11-01 NOTE — ED PROVIDER NOTES
"  History     Chief Complaint   Patient presents with     Lymphadenopathy     Right leg is very swollen, hot and weeping.     HPI  Supriya Herr is a 67 year old female who presents due to right leg swelling, warmth, pain, and weeping.  Patient states she has a history of chronic lower extremity (particularly lower leg) lymphedema on the right.  She noticed some increasing lower leg lymphedema the last couple of days, with blistering.  Today she noticed rapidly increasing swelling of her upper leg on the right also with warmth pain, and tenderness of the right posterior upper leg.  She has experienced some chills and she believes a low-grade fever.  Denies any trauma.  Denies abdominal pain or shortness of breath.  Denies any swelling of other parts of the body.  No other complaints.    I have reviewed the Medications, Allergies, Past Medical and Surgical History, and Social History in the Epic system.    Review of Systems  All other systems were reviewed and are negative    Physical Exam   BP: 173/62  Pulse: 85  Heart Rate: 89  Temp: 99.4  F (37.4  C)  Resp: 16  Height: 167 cm (5' 5.75\")  Weight: 102.4 kg (225 lb 12.8 oz)  SpO2: 95 %      Physical Exam   Constitutional: She is oriented to person, place, and time. She appears well-developed and well-nourished.   HENT:   Head: Normocephalic and atraumatic.   Mouth/Throat: Oropharynx is clear and moist.   Eyes: EOM are normal. Pupils are equal, round, and reactive to light.   Neck: Normal range of motion. Neck supple. No tracheal deviation present. No thyromegaly present.   Cardiovascular: Regular rhythm, normal heart sounds and intact distal pulses.  Tachycardia present.  Exam reveals no gallop and no friction rub.    No murmur heard.  Heart rate is 110 at the time my exam   Pulmonary/Chest: Effort normal and breath sounds normal. She exhibits no tenderness.   Abdominal: Soft. Bowel sounds are normal. She exhibits no distension and no mass. There is no tenderness. "   Musculoskeletal: She exhibits no edema or tenderness.        Legs:  Neurological: She is alert and oriented to person, place, and time. No cranial nerve deficit. Coordination normal.   Skin: Skin is warm and dry. No rash noted.   Psychiatric: She has a normal mood and affect. Her behavior is normal.   Nursing note and vitals reviewed.      ED Course     ED Course     Procedures             Critical Care time:  none             Labs Ordered and Resulted from Time of ED Arrival Up to the Time of Departure from the ED   CBC WITH PLATELETS DIFFERENTIAL - Abnormal; Notable for the following:        Result Value    WBC 18.0 (*)     RBC Count 3.32 (*)     Hemoglobin 9.4 (*)     Hematocrit 28.7 (*)     Absolute Neutrophil 15.5 (*)     All other components within normal limits   COMPREHENSIVE METABOLIC PANEL - Abnormal; Notable for the following:     Glucose 245 (*)     Urea Nitrogen 38 (*)     Creatinine 2.86 (*)     GFR Estimate 16 (*)     GFR Estimate If Black 20 (*)     Calcium 8.4 (*)     Albumin 1.9 (*)     All other components within normal limits   CRP INFLAMMATION - Abnormal; Notable for the following:     CRP Inflammation 138.0 (*)     All other components within normal limits   ERYTHROCYTE SEDIMENTATION RATE AUTO - Abnormal; Notable for the following:     Sed Rate 119 (*)     All other components within normal limits   LACTIC ACID WHOLE BLOOD - Abnormal; Notable for the following:     Lactic Acid 0.6 (*)     All other components within normal limits   GLUCOSE BY METER - Abnormal; Notable for the following:     Glucose 197 (*)     All other components within normal limits            Assessments & Plan (with Medical Decision Making)   Patient with multiple medical problems including diabetes, chronic kidney disease, lymphedema, and prior left traumatic below the knee amputation.  She presents with acute onset of erythema pain and swelling of her right lower extremity which has rapidly progressed over the last  18-24 hours.  Differential diagnosis includes cellulitis, abscess, DVT, necrotizing fasciitis.  She has markedly elevated white blood cell count and inflammatory markers.  Temperature slightly elevated vital signs otherwise stable without signs of sepsis.  Her lactate is not elevated.  Due to the severity of her presentation she was immediately started on broad-spectrum antibiotics and imaging was obtained.  This does not show evidence of DVT or necrotizing fasciitis.  Patient clearly needs to be admitted to the inpatient service.  Due to her multiple comorbidities and the potential for worsening we will admit to the [facility].  I spoke with the hospitalist.  She is stable for transport to the [facility].    I have reviewed the nursing notes.    I have reviewed the findings, diagnosis, plan and need for follow up with the patient.    Current Discharge Medication List          Final diagnoses:   Cellulitis of right leg       11/1/2017   [facility], [place], EMERGENCY DEPARTMENT     Dusty Armijo MD  11/04/17 0815

## 2017-11-01 NOTE — PROGRESS NOTES
[facility]  Transfer Triage Note    Date of call: 11/01/17  Time of call: 3:54 PM    Reason for Transfer:Further diagnostic work up, management, and consultation for specialized care  Diagnosis: Cellulitis; concern for necrotizing fascitis     Outside Records: Available  Additional records requested to be faxed to [phone].    Stability of Patient: Patient is vitally stable, with no critical labs, and will likely remain stable throughout the transfer process    Expected Time of Arrival for Transfer: 0-8 hours    Recommendations for Management and Stabilization: Given    Additional Comments   Call from [facility]. 66 y/o F with PMH of DM, traumatic amputation presenting with worsening right  leg swelling, with rapidly evolving redness and swelling; got vanc and zosyn, concern for nec fascitis; no evidence of clot in US.   CT lower extremity pending   Given concern for necrotizing fascitis and no availability of surgery there decision made to transfer here          Shira Noel MD

## 2017-11-02 ENCOUNTER — APPOINTMENT (OUTPATIENT)
Dept: OCCUPATIONAL THERAPY | Facility: CLINIC | Age: 67
DRG: 603 | End: 2017-11-02
Payer: MEDICARE

## 2017-11-02 LAB
ALBUMIN UR-MCNC: >600 MG/DL
ANION GAP SERPL CALCULATED.3IONS-SCNC: 6 MMOL/L (ref 3–14)
APPEARANCE UR: CLEAR
BACTERIA #/AREA URNS HPF: ABNORMAL /HPF
BASOPHILS # BLD AUTO: 0 10E9/L (ref 0–0.2)
BASOPHILS NFR BLD AUTO: 0.1 %
BILIRUB UR QL STRIP: NEGATIVE
BUN SERPL-MCNC: 39 MG/DL (ref 7–30)
CALCIUM SERPL-MCNC: 7.9 MG/DL (ref 8.5–10.1)
CHLORIDE SERPL-SCNC: 109 MMOL/L (ref 94–109)
CO2 SERPL-SCNC: 25 MMOL/L (ref 20–32)
COLOR UR AUTO: ABNORMAL
CREAT SERPL-MCNC: 2.92 MG/DL (ref 0.52–1.04)
CREAT UR-MCNC: 65 MG/DL
CRP SERPL-MCNC: 220 MG/L (ref 0–8)
DIFFERENTIAL METHOD BLD: ABNORMAL
EOSINOPHIL # BLD AUTO: 0.1 10E9/L (ref 0–0.7)
EOSINOPHIL NFR BLD AUTO: 0.8 %
ERYTHROCYTE [DISTWIDTH] IN BLOOD BY AUTOMATED COUNT: 13.9 % (ref 10–15)
FRACT EXCRET NA UR+SERPL-RTO: 1.3 %
GFR SERPL CREATININE-BSD FRML MDRD: 16 ML/MIN/1.7M2
GLUCOSE BLDC GLUCOMTR-MCNC: 112 MG/DL (ref 70–99)
GLUCOSE BLDC GLUCOMTR-MCNC: 307 MG/DL (ref 70–99)
GLUCOSE BLDC GLUCOMTR-MCNC: 38 MG/DL (ref 70–99)
GLUCOSE BLDC GLUCOMTR-MCNC: 50 MG/DL (ref 70–99)
GLUCOSE BLDC GLUCOMTR-MCNC: 88 MG/DL (ref 70–99)
GLUCOSE BLDC GLUCOMTR-MCNC: 92 MG/DL (ref 70–99)
GLUCOSE SERPL-MCNC: 92 MG/DL (ref 70–99)
GLUCOSE UR STRIP-MCNC: 300 MG/DL
HCT VFR BLD AUTO: 24.8 % (ref 35–47)
HGB BLD-MCNC: 7.7 G/DL (ref 11.7–15.7)
HGB UR QL STRIP: NEGATIVE
IMM GRANULOCYTES # BLD: 0.1 10E9/L (ref 0–0.4)
IMM GRANULOCYTES NFR BLD: 0.3 %
KETONES UR STRIP-MCNC: NEGATIVE MG/DL
LEUKOCYTE ESTERASE UR QL STRIP: ABNORMAL
LYMPHOCYTES # BLD AUTO: 1.5 10E9/L (ref 0.8–5.3)
LYMPHOCYTES NFR BLD AUTO: 10 %
MCH RBC QN AUTO: 27.2 PG (ref 26.5–33)
MCHC RBC AUTO-ENTMCNC: 31 G/DL (ref 31.5–36.5)
MCV RBC AUTO: 88 FL (ref 78–100)
MONOCYTES # BLD AUTO: 0.9 10E9/L (ref 0–1.3)
MONOCYTES NFR BLD AUTO: 6.4 %
MRSA DNA SPEC QL NAA+PROBE: NEGATIVE
NEUTROPHILS # BLD AUTO: 11.9 10E9/L (ref 1.6–8.3)
NEUTROPHILS NFR BLD AUTO: 82.4 %
NITRATE UR QL: NEGATIVE
NRBC # BLD AUTO: 0 10*3/UL
NRBC BLD AUTO-RTO: 0 /100
OSMOLALITY UR: 330 MMOL/KG (ref 100–1200)
PH UR STRIP: 7.5 PH (ref 5–7)
PLATELET # BLD AUTO: 208 10E9/L (ref 150–450)
POTASSIUM SERPL-SCNC: 3.8 MMOL/L (ref 3.4–5.3)
RBC # BLD AUTO: 2.83 10E12/L (ref 3.8–5.2)
RBC #/AREA URNS AUTO: 1 /HPF (ref 0–2)
SODIUM SERPL-SCNC: 140 MMOL/L (ref 133–144)
SODIUM UR-SCNC: 40 MMOL/L
SOURCE: ABNORMAL
SP GR UR STRIP: 1.01 (ref 1–1.03)
SPECIMEN SOURCE: NORMAL
SQUAMOUS #/AREA URNS AUTO: 1 /HPF (ref 0–1)
TRANS CELLS #/AREA URNS HPF: <1 /HPF (ref 0–1)
UROBILINOGEN UR STRIP-MCNC: NORMAL MG/DL (ref 0–2)
UUN UR-MCNC: 398 MG/DL
UUN/CREAT 24H UR: 6 G/G CR
WBC # BLD AUTO: 14.5 10E9/L (ref 4–11)
WBC #/AREA URNS AUTO: 44 /HPF (ref 0–2)

## 2017-11-02 PROCEDURE — 85025 COMPLETE CBC W/AUTO DIFF WBC: CPT | Performed by: FAMILY MEDICINE

## 2017-11-02 PROCEDURE — A9270 NON-COVERED ITEM OR SERVICE: HCPCS | Mod: GY | Performed by: FAMILY MEDICINE

## 2017-11-02 PROCEDURE — 25000131 ZZH RX MED GY IP 250 OP 636 PS 637: Mod: GY | Performed by: FAMILY MEDICINE

## 2017-11-02 PROCEDURE — 25000132 ZZH RX MED GY IP 250 OP 250 PS 637: Mod: GY | Performed by: FAMILY MEDICINE

## 2017-11-02 PROCEDURE — 97110 THERAPEUTIC EXERCISES: CPT | Mod: GO

## 2017-11-02 PROCEDURE — 86140 C-REACTIVE PROTEIN: CPT | Performed by: FAMILY MEDICINE

## 2017-11-02 PROCEDURE — 25000132 ZZH RX MED GY IP 250 OP 250 PS 637: Mod: GY | Performed by: STUDENT IN AN ORGANIZED HEALTH CARE EDUCATION/TRAINING PROGRAM

## 2017-11-02 PROCEDURE — 99211 OFF/OP EST MAY X REQ PHY/QHP: CPT

## 2017-11-02 PROCEDURE — 81001 URINALYSIS AUTO W/SCOPE: CPT | Performed by: FAMILY MEDICINE

## 2017-11-02 PROCEDURE — 84300 ASSAY OF URINE SODIUM: CPT | Performed by: FAMILY MEDICINE

## 2017-11-02 PROCEDURE — 36415 COLL VENOUS BLD VENIPUNCTURE: CPT | Performed by: FAMILY MEDICINE

## 2017-11-02 PROCEDURE — 12000008 ZZH R&B INTERMEDIATE UMMC

## 2017-11-02 PROCEDURE — 82570 ASSAY OF URINE CREATININE: CPT | Performed by: FAMILY MEDICINE

## 2017-11-02 PROCEDURE — 87641 MR-STAPH DNA AMP PROBE: CPT | Performed by: FAMILY MEDICINE

## 2017-11-02 PROCEDURE — 25000125 ZZHC RX 250: Performed by: FAMILY MEDICINE

## 2017-11-02 PROCEDURE — 87086 URINE CULTURE/COLONY COUNT: CPT | Performed by: FAMILY MEDICINE

## 2017-11-02 PROCEDURE — 84540 ASSAY OF URINE/UREA-N: CPT | Performed by: FAMILY MEDICINE

## 2017-11-02 PROCEDURE — 00000146 ZZHCL STATISTIC GLUCOSE BY METER IP

## 2017-11-02 PROCEDURE — 87640 STAPH A DNA AMP PROBE: CPT | Performed by: FAMILY MEDICINE

## 2017-11-02 PROCEDURE — 25000128 H RX IP 250 OP 636: Performed by: FAMILY MEDICINE

## 2017-11-02 PROCEDURE — 80048 BASIC METABOLIC PNL TOTAL CA: CPT | Performed by: FAMILY MEDICINE

## 2017-11-02 PROCEDURE — 97165 OT EVAL LOW COMPLEX 30 MIN: CPT | Mod: GO

## 2017-11-02 PROCEDURE — 83935 ASSAY OF URINE OSMOLALITY: CPT | Performed by: FAMILY MEDICINE

## 2017-11-02 PROCEDURE — 40000133 ZZH STATISTIC OT WARD VISIT

## 2017-11-02 PROCEDURE — 97535 SELF CARE MNGMENT TRAINING: CPT | Mod: GO

## 2017-11-02 RX ORDER — LORATADINE 10 MG/1
10 TABLET ORAL DAILY PRN
Status: DISCONTINUED | OUTPATIENT
Start: 2017-11-02 | End: 2017-11-13 | Stop reason: HOSPADM

## 2017-11-02 RX ORDER — HEPARIN SODIUM 10000 [USP'U]/ML
7500 INJECTION, SOLUTION INTRAVENOUS; SUBCUTANEOUS EVERY 8 HOURS
Status: DISCONTINUED | OUTPATIENT
Start: 2017-11-02 | End: 2017-11-13 | Stop reason: HOSPADM

## 2017-11-02 RX ORDER — HYDRALAZINE HYDROCHLORIDE 20 MG/ML
10 INJECTION INTRAMUSCULAR; INTRAVENOUS EVERY 6 HOURS PRN
Status: DISCONTINUED | OUTPATIENT
Start: 2017-11-02 | End: 2017-11-13 | Stop reason: HOSPADM

## 2017-11-02 RX ORDER — LACTOBACILLUS RHAMNOSUS GG 10B CELL
1 CAPSULE ORAL 2 TIMES DAILY
Status: DISCONTINUED | OUTPATIENT
Start: 2017-11-02 | End: 2017-11-13 | Stop reason: HOSPADM

## 2017-11-02 RX ORDER — FUROSEMIDE 20 MG
40 TABLET ORAL 2 TIMES DAILY
Status: DISCONTINUED | OUTPATIENT
Start: 2017-11-02 | End: 2017-11-03

## 2017-11-02 RX ADMIN — PIPERACILLIN AND TAZOBACTAM 2.25 G: 2; .25 INJECTION, POWDER, LYOPHILIZED, FOR SOLUTION INTRAVENOUS; PARENTERAL at 07:51

## 2017-11-02 RX ADMIN — INSULIN GLARGINE 35 UNITS: 100 INJECTION, SOLUTION SUBCUTANEOUS at 22:10

## 2017-11-02 RX ADMIN — DULOXETINE HYDROCHLORIDE 30 MG: 30 CAPSULE, DELAYED RELEASE ORAL at 21:01

## 2017-11-02 RX ADMIN — ALBUTEROL SULFATE 2 PUFF: 90 AEROSOL, METERED RESPIRATORY (INHALATION) at 08:52

## 2017-11-02 RX ADMIN — Medication 1 CAPSULE: at 21:00

## 2017-11-02 RX ADMIN — FUROSEMIDE 40 MG: 20 TABLET ORAL at 21:00

## 2017-11-02 RX ADMIN — ACETAMINOPHEN 650 MG: 325 TABLET, FILM COATED ORAL at 06:01

## 2017-11-02 RX ADMIN — AMLODIPINE BESYLATE 10 MG: 10 TABLET ORAL at 07:48

## 2017-11-02 RX ADMIN — PIPERACILLIN AND TAZOBACTAM 2.25 G: 2; .25 INJECTION, POWDER, LYOPHILIZED, FOR SOLUTION INTRAVENOUS; PARENTERAL at 21:01

## 2017-11-02 RX ADMIN — CARVEDILOL 25 MG: 6.25 TABLET, FILM COATED ORAL at 08:51

## 2017-11-02 RX ADMIN — INSULIN ASPART 6 UNITS: 100 INJECTION, SOLUTION INTRAVENOUS; SUBCUTANEOUS at 08:53

## 2017-11-02 RX ADMIN — ACETAMINOPHEN 650 MG: 325 TABLET, FILM COATED ORAL at 21:01

## 2017-11-02 RX ADMIN — FUROSEMIDE 40 MG: 20 TABLET ORAL at 07:48

## 2017-11-02 RX ADMIN — HEPARIN SODIUM 5000 UNITS: 5000 INJECTION, SOLUTION INTRAVENOUS; SUBCUTANEOUS at 01:24

## 2017-11-02 RX ADMIN — CARVEDILOL 25 MG: 6.25 TABLET, FILM COATED ORAL at 18:17

## 2017-11-02 RX ADMIN — ACETAMINOPHEN 650 MG: 325 TABLET, FILM COATED ORAL at 17:04

## 2017-11-02 RX ADMIN — SODIUM CHLORIDE: 9 INJECTION, SOLUTION INTRAVENOUS at 07:51

## 2017-11-02 RX ADMIN — HEPARIN SODIUM 7500 UNITS: 10000 INJECTION, SOLUTION INTRAVENOUS; SUBCUTANEOUS at 22:23

## 2017-11-02 RX ADMIN — ACETAMINOPHEN 650 MG: 325 TABLET, FILM COATED ORAL at 13:27

## 2017-11-02 RX ADMIN — DULOXETINE HYDROCHLORIDE 30 MG: 30 CAPSULE, DELAYED RELEASE ORAL at 07:48

## 2017-11-02 RX ADMIN — PIPERACILLIN AND TAZOBACTAM 2.25 G: 2; .25 INJECTION, POWDER, LYOPHILIZED, FOR SOLUTION INTRAVENOUS; PARENTERAL at 14:48

## 2017-11-02 RX ADMIN — INSULIN ASPART 6 UNITS: 100 INJECTION, SOLUTION INTRAVENOUS; SUBCUTANEOUS at 18:22

## 2017-11-02 RX ADMIN — ATORVASTATIN CALCIUM 20 MG: 20 TABLET, FILM COATED ORAL at 07:48

## 2017-11-02 RX ADMIN — DEXTROSE 30 G: 15 GEL ORAL at 12:40

## 2017-11-02 RX ADMIN — SODIUM CHLORIDE: 9 INJECTION, SOLUTION INTRAVENOUS at 18:35

## 2017-11-02 RX ADMIN — MICONAZOLE NITRATE: 2 POWDER TOPICAL at 21:29

## 2017-11-02 RX ADMIN — PIPERACILLIN AND TAZOBACTAM 2.25 G: 2; .25 INJECTION, POWDER, LYOPHILIZED, FOR SOLUTION INTRAVENOUS; PARENTERAL at 01:25

## 2017-11-02 RX ADMIN — ONDANSETRON 4 MG: 4 TABLET, ORALLY DISINTEGRATING ORAL at 11:33

## 2017-11-02 RX ADMIN — MICONAZOLE NITRATE: 2 POWDER TOPICAL at 14:48

## 2017-11-02 RX ADMIN — LORATADINE 10 MG: 10 TABLET ORAL at 07:48

## 2017-11-02 ASSESSMENT — ENCOUNTER SYMPTOMS
CHILLS: 0
VOMITING: 0
RHINORRHEA: 0
MYALGIAS: 1
SHORTNESS OF BREATH: 0
LIGHT-HEADEDNESS: 0
ABDOMINAL PAIN: 0
NAUSEA: 0
DIARRHEA: 0
SORE THROAT: 0
COUGH: 0
BACK PAIN: 1
HEADACHES: 0
FEVER: 0
DYSURIA: 0
CONSTIPATION: 0
ARTHRALGIAS: 1

## 2017-11-02 ASSESSMENT — PAIN DESCRIPTION - DESCRIPTORS
DESCRIPTORS: TENDER
DESCRIPTORS: ACHING
DESCRIPTORS: ACHING

## 2017-11-02 NOTE — PROGRESS NOTES
[facility] Nurse Inpatient Adult Pressure InjuryAssessment    Initial Assessment  Reason for consultation: Evaluate and treat coccyx wound     Assessment:   Coccyx wound due to Pressure Injury    Pressure Injury is:  Identified as Stage 1 in admission charting but appearance is more consistent with a full thickness injury (3 vs 4) that developed hyperkeratotic tissue.  May eventually need sharps debridement of this extraneous tissue.       Status: initial assessment, Unchanged  Stable    TREATMENT  PLAN    Coccyx wound: cleanse with microklenz spray and gauze.  Cover with mepilex border dressing. Change every 3 days and as needed  Orders Written  WO Nurse follow-up plan:weekly  Nursing to notify the Provider(s) and re-consult the WO Nurse if wound(s) deteriorates or new skin concern.    Patient History  According to provider note(s):  67 year old female with a significant past medical history of chronic kidney disease stage 3, congestive heart failure, diabetes, HTN, HLD, chronic lymphedema, JONE and morbid obesity admitted for RLE cellulitis.  Objective Data   Containment of urine/stool: Diaper    Current Diet/ Nutrition:    Active Diet Order      Moderate Consistent CHO Diet    Output:   I/O last 3 completed shifts:  In: 2694.67 [P.O.:840; I.V.:1854.67]  Out: 700 [Urine:700]    Skin Assessment: Ben Ben Score  Av.3  Min: 14  Max: 15                               Labs:   Recent Labs  Lab 17  0828   HGB 7.7*   WBC 14.5*   .0*       Physical Exam    Skin assessment:   Focused skin inspection: buttocks    Wound Location:  Coccyx   Wound History: wound present on admission.  Pt spends the majority of the day in a wheelchair.  Pt states that the current cushion is old and she has a new cushion ordered.    Measurements (length x width x depth, in cm) 5 cm diameter raised ball of tissue with 2 mm line of red moist tissue surrounded by epithelium.  Inferior to this raised area is an approximately 3 cm x 2  cm area of unpigmented scar tissue.    Tunneling N/A  Undermining N/A  Palpation of the wound bed: normal   Periwound skin: intact  Temperature: normal   Drainage:, none  Odor: none  Pain: denies ,     Interventions  Current support surface: Standard  Atmos Air    Current off-loading measures: Pillows under calves  Visual inspection of wound(s) completed  Wound Care:was done per plan of care    Cleansing with sterile NS solution  Supplies: Gathered  Education provided today: offloading     Discussed plan of care with Patient and Nurse    Face to face time: 10 minutes

## 2017-11-02 NOTE — PLAN OF CARE
Problem: Patient Care Overview  Goal: Plan of Care/Patient Progress Review  Outcome: Therapy, progress toward functional goals is gradual  VSS. Tylenol given for headache. A&Ox4. Coccyx area assessed by WOCN and covered with mepilex; c/d/i. R leg redness within drawn area and receding. Up with Ax2 and able to pivot to bedside commode able to make needs known how best to get in and out of bed. Voiding adequately and had a large BM over this shift. BG checked with meals and HS; hypoglycemic without any s/s; juice, lunch, and 30g Glucose 40% given and brought BG to 88. Continue to monitor. PIV infusing MIVF and abx. Continue with POC.
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
## 2017-11-02 NOTE — PROGRESS NOTES
[facility] daily progress [facility]      Date of admission: 11/1/2017  Date of service: 11/2/2017       Assessment and Plan:   Assessment:   Supriya Herr is a 67 year old female with a significant past medical history of chronic kidney disease stage 3, congestive heart failure, diabetes, HTN, HLD, chronic lymphedema, JONE and morbid obesity admitted for RLE cellulitis.  Patient Active Problem List   Diagnosis     Vitiligo     Traumatic amputation of leg above knee (H)     Contact dermatitis and other eczema, due to unspecified cause     Dermatophytosis of nail     Generalized osteoarthrosis, unspecified site     Hypertension goal BP (blood pressure) < 140/90     Mild nonproliferative diabetic retinopathy (H)     Proteinuria     Stage I pressure ulcer     Hyperlipidemia LDL goal <100     Non compliance with medical treatment     Tobacco abuse     Advanced directives, counseling/discussion     Incontinence of urine     Basal cell carcinoma     Senile nuclear sclerosis     JONE (obstructive sleep apnea)     CHF (congestive heart failure) (H)     Health Care Home     CKD (chronic kidney disease) stage 3, GFR 30-59 ml/min     Type 2 diabetes, HbA1C goal < 8% (H)     Type 2 diabetes mellitus with diabetic chronic kidney disease (H)     Morbid obesity (H)     Type 2 diabetes mellitus with stage 3 chronic kidney disease, without long-term current use of insulin (H)     Moderate recurrent major depression (H)     Type 2 diabetes mellitus with diabetic neuropathy, with long-term current use of insulin (H)     Cellulitis      Plan:    ## Right thigh Cellulitis   ## Chronic lymphedema   Unclear bacterial entry point.  Of note patient has right pelvis metal plate present.  Extremely low suspicion for necrotizing fascitis given reassuring CT findings and physical exam.  Significantly improved erythema, pain and swelling since receiving IV antibiotics starting yesterday  - Discussed deescalating  antibiotics, but given co morbidities, marked leukocytosis and elevated CRP on admission, and rapid progression per patient report, decided to keep broad spectrum antibiotics 1 more day at least.  Continue Zosyn and Vancomycin  - Monitor erythema relative to skin markings   - Trend CBC and CRP daily until down-trending  - Follow up pending blood cultures given infection + hardware in pelvis  - PO pain control  - Offload the right thigh with frequent repositioning       ## LORI on CKD stage 3 - baseline creatinine 1.8-2.1  Likely pre-renal given some reported vomiting + third spacing; may be related to ongoing diuretic therapy as well.    - Fractional excretion of urea (as patient is on diuretics) to assess for renal vs pre-renal  - Continue mIVF NS 100ml/hr, strict intake and output  - Hold lisinopril 40mg BID given LORI  - Hold tolterodine 2mg   - Hold lasix for now.  - Avoid nephrotoxic drugs and renally dose medication  - UA with micro, Fena and Uosm  - strict I/O's and daily weights    - Consider renal ultrasound and nephrology consult if worsening LORI     ##Diabetes type 2   Uncontrolled diabetes with last Hgb A1c 9.2 (9/13/17). She follows with endocrinology for her diabetes. Daughter reports non-adherence to current diabetic regimen and that she frequently forgets meal coverage.   - Home regimen: Lantus 70u at bedtime, Novolog 6U with meals and sliding scale  - Hospital regimen: Lantus 35u at bedtime, Novolog 6U with meals and MSSI  - Hypoglycemia protocol  - Moderate consistent carbohydrate diet     ##HTN  - Continue carvedilol 25mg BID  - Continue amlodipine 10mg daily   - Hold lisinopril 40mg BID due to LORI  - Hold lasix due to LORI, monitor I/O, if patient appears to be lasix dependent, will restart  - Hydralazine 10mg PRN BP >180/110     ##HFpEF  Last echocardiogram 12/2013 with normal LV function but technically difficulty study due to patient's obesity.  HFpEF documented in chart, but no impaired  "diastolic filling on echo  - continue carvedilol 25mg BID  - Hold lisinopril in setting of LORI  - consider repeat echocardiogram as outpatient     ##Normocytic Anemia- Stable  Hgb is stable at 9.4 with baseline ~9-10. She has a history of iron deficiency anemia.   - Consider iron studies and iron supplementation as outpatient      ##Neuropathy  - continue cymbalta 30mg BID     ##HLD  - continue lipitor 20mg daily     ##JONE  - Refuses use of CPAP since she is unable to tolerate   - Continue to encourage CPAP use, but not ordered currently     ##Stage 1 pressure ulcer on coccyx   - WOC consult     ## Physical deconditioning  - PT/OT consult           Fluids:NS@100  Electrolytes:WNL, will monitor  Nutrition:Moderate carb control diet  Lines:PIV  Activity: -Up as tolerated, PT, OT  CODE: Full Code  Prophylaxis: Heparin SQ  PCP communication:  - Noam Jackson  - Contacted at admission: No  - Concerns or updates: N/A  Dispo: Expected discharge date 2-3 days pending clinical improvement              Interval History:   Supriya Herr says her right thigh still hurts but it feels \"so much better\". She also reports that the swelling is down significantly and there redness is much lighter in color and has shrunk to well within the marked area on her thigh.  No fevers or chills.              Review of Systems:   Review of Systems   Constitutional: Negative for chills and fever.   HENT: Negative for rhinorrhea and sore throat.    Respiratory: Negative for cough and shortness of breath.    Cardiovascular: Negative for chest pain.   Gastrointestinal: Negative for abdominal pain, constipation, diarrhea, nausea and vomiting.   Genitourinary: Negative for dysuria.   Musculoskeletal: Positive for arthralgias, back pain and myalgias.   Neurological: Negative for light-headedness and headaches.            Physical Exam (Resident / Clinician):     Vitals were reviewed  Patient Vitals for the past 24 hrs:   BP Temp Temp src " Pulse Heart Rate Resp SpO2 Weight   11/02/17 1450 116/42 96  F (35.6  C) Oral 71 - 18 92 % -   11/02/17 0900 - - - - - - - 102.4 kg (225 lb 12.8 oz)   11/02/17 0621 134/58 97.8  F (36.6  C) Oral 78 - 18 96 % -   11/02/17 0001 145/42 99.1  F (37.3  C) Oral - 75 18 93 % -   11/01/17 1945 164/56 99.4  F (37.4  C) Oral - 85 20 93 % -   11/01/17 1815 - - - - 85 (!) 37 95 % -   11/01/17 1730 158/61 - - - 87 18 95 % -   11/01/17 1729 159/57 - - - 89 20 94 % -       Physical Exam   Constitutional: She is oriented to person, place, and time. She appears well-developed and well-nourished. No distress.   HENT:   Head: Normocephalic and atraumatic.   Neck: Neck supple.   Cardiovascular: Normal rate, regular rhythm and normal heart sounds.    No murmur heard.  Pulmonary/Chest: Effort normal. No respiratory distress. She has wheezes (few occasional wheezes). She has no rales.   Abdominal: Soft. She exhibits no distension. There is tenderness (RUQ and epigastric). There is no rebound and no guarding.   Musculoskeletal: She exhibits no edema (1+ pitting edema right lower extremity).   Tenderness along right lateral thigh from hip to knee over erythematous area. Midline back lumbar tenderness.  S/P Left AKA, well healed, no erythema or swelling   Neurological: She is alert and oriented to person, place, and time.   Skin: Skin is warm and dry.   Mild erythema right lateral thigh from hip to just below knee.  Left leg with venous stasis changes below the knee. Anteriorly there is a small 0.5cm eschar without any fluctuance, drainage, surrounding erythema.  Feet are dry, large callous on ball of right foot.  Toes mis-aligned, no overt skin breaks/wounds between toes, but skin is dry and cracking.   Psychiatric: She has a normal mood and affect.     I/O last 3 completed shifts:  In: 1894.67 [P.O.:840; I.V.:1054.67]  Out: 700 [Urine:700]            Data:   ROUTINE LABS (Last four results)  CMP  Recent Labs  Lab 11/02/17  0887  11/01/17 2135 11/01/17  1414     --  139   POTASSIUM 3.8  --  3.6   CHLORIDE 109  --  106   CO2 25  --  24   ANIONGAP 6  --  9   GLC 92  --  245*   BUN 39*  --  38*   CR 2.92* 2.82* 2.86*   GFRESTIMATED 16* 17* 16*   GFRESTBLACK 19* 20* 20*   JODY 7.9*  --  8.4*   PROTTOTAL  --   --  7.2   ALBUMIN  --   --  1.9*   BILITOTAL  --   --  0.3   ALKPHOS  --   --  79   AST  --   --  7   ALT  --   --  15     CBC  Recent Labs  Lab 11/02/17  0828 11/01/17 2135 11/01/17  1414   WBC 14.5*  --  18.0*   RBC 2.83*  --  3.32*   HGB 7.7*  --  9.4*   HCT 24.8*  --  28.7*   MCV 88  --  86   MCH 27.2  --  28.3   MCHC 31.0*  --  32.8   RDW 13.9  --  13.5    251 269     INRNo lab results found in last 7 days.  CRP  Recent Labs  Lab 11/02/17  0828 11/01/17  1414   .0* 138.0*         Blood culture:  Invalid input(s): BC   Urine culture:  No results for input(s): URC in the last 168 hours.  All cultures:    Recent Labs  Lab 11/02/17  0600 11/01/17  1550 11/01/17  1414   CULT Culture negative monitoring continues No growth after 20 hours No growth after 1 day           Medications:     Current Facility-Administered Medications   Medication     hydrALAZINE (APRESOLINE) injection 10 mg     loratadine (CLARITIN) tablet 10 mg     miconazole (MICATIN; MICRO GUARD) 2 % powder     miconazole (MICATIN; MICRO GUARD) 2 % powder     lactobacillus rhamnosus (GG) (CULTURELL) capsule 1 capsule     heparin injection 7,500 Units     insulin aspart (NovoLOG) inj (RAPID ACTING)     albuterol (PROAIR HFA/PROVENTIL HFA/VENTOLIN HFA) Inhaler 2 puff     amLODIPine (NORVASC) tablet 10 mg     atorvastatin (LIPITOR) tablet 20 mg     carvedilol (COREG) tablet 25 mg     DULoxetine (CYMBALTA) EC capsule 30 mg     insulin aspart (NovoLOG) inj (RAPID ACTING)     naloxone (NARCAN) injection 0.1-0.4 mg     0.9% sodium chloride infusion     polyethylene glycol (MIRALAX/GLYCOLAX) Packet 17 g     ondansetron (ZOFRAN-ODT) ODT tab 4 mg    Or     ondansetron  (ZOFRAN) injection 4 mg     glucose 40 % gel 15-30 g    Or     dextrose 50 % injection 25-50 mL    Or     glucagon injection 1 mg     insulin aspart (NovoLOG) inj (RAPID ACTING)     insulin aspart (NovoLOG) inj (RAPID ACTING)     insulin glargine (LANTUS) injection 35 Units     piperacillin-tazobactam (ZOSYN) 2.25 g vial to attach to  ml bag     acetaminophen (TYLENOL) tablet 650 mg     oxyCODONE (ROXICODONE) IR tablet 5 mg       Caring Physician: Brayden Carrasco MD  [facility], [facility]  Pager Contact: see Physician sticky note

## 2017-11-02 NOTE — PHARMACY-ADMISSION MEDICATION HISTORY
Admission medication history interview status for the 11/1/2017 admission is complete. See Epic admission navigator for allergy information, pharmacy, prior to admission medications and immunization status.     Medication history interview sources:  Patient, Patient's daughter (Caroline Gray)    Changes made to PTA medication list (reason)  Added: N/A  Deleted: duplicate atorvastatin  Changed: N/A    Additional medication history information (including reliability of information, actions taken by pharmacist):  - Patient and daughter were moderate historians. Knew she was on four blood pressure meds and the units for each insulin.  - Patient was not sure of her atorvastatin strength or if she was still taking tolterodine. Confirmed with [facility] at[facility]atin 20 mg and patient had regularly been picking up tolterodine  - Patient stated she takes lisinopril 40 mg QD but pharmacy has dosing as 40 mg BID. Medication history in epic and fill history with pharmacy suggest 40 mg BID is correct dosing and patient may be confused  - Received influenza vaccine last week  - Confirmed NKDA  - Sliding scale obtained from endo note on 9/13     Prior to Admission medications    Medication Sig Last Dose Taking? Auth Provider   tolterodine (DETROL LA) 2 MG 24 hr capsule TAKE 1 CAPSULE (2 MG) BY MOUTH DAILY 11/1/2017 at AM Yes Noam Jackson MD   NOVOLOG FLEXPEN 100 UNIT/ML soln Take 6 units plus sliding scale before each meals  Patient taking differently: Take 6 units plus sliding scale before each meals.     150-200 = 1 unit  201-250 = 2 units  251-300 = 3 units   301-350 = 4 units  351-400 = 5 units  401-450 = 6 units  451-500 = 7 units  501-550 = 8 units  551-600 = 9 units call your healthcare team 11/1/2017 at AM Yes Arabella Kamara PA-C   insulin glargine (LANTUS SOLOSTAR) 100 UNIT/ML injection 70 units at bedtime 10/31/2017 at PM Yes Noam Jackson MD   DULoxetine (CYMBALTA) 30 MG EC capsule Take 1 capsule (30  mg) by mouth 2 times daily 11/1/2017 at AM Yes Noam Jackson MD   furosemide (LASIX) 80 MG tablet Take 1 tablet (80 mg) by mouth 2 times daily Patient taking 80mg in AM, 40mg in PM 11/1/2017 at AM Yes Kathryn Basilio MD   atorvastatin (LIPITOR) 20 MG tablet Take 1 tablet (20 mg) by mouth daily 11/1/2017 at AM Yes Noam Jackson MD   carvedilol (COREG) 25 MG tablet Take 1 tablet (25 mg) by mouth 2 times daily (with meals) 11/1/2017 at AM Yes Noam Jackson MD   amLODIPine (NORVASC) 10 MG tablet Take 1 tablet (10 mg) by mouth daily 11/1/2017 at AM Yes Noam Jackson MD   lisinopril (PRINIVIL/ZESTRIL) 40 MG tablet Take 1 tablet (40 mg) by mouth 2 times daily 11/1/2017 at AM Yes Noam Jackson MD   albuterol (PROAIR HFA, PROVENTIL HFA, VENTOLIN HFA) 108 (90 BASE) MCG/ACT inhaler Inhale 2 puffs into the lungs every 6 hours as needed for shortness of breath / dyspnea or wheezing Past Week at Unknown time Yes Noam Jackson MD   MULTIVITAMIN OR 1 tablet by mouth daily 11/1/2017 at AM Yes Reported, Patient   order for DME 1 wheelchair   Noam Jackson MD   blood glucose monitoring (ONE TOUCH ULTRA) test strip Use to test blood sugars 2 times daily or as directed.   Noam Jackson MD   order for DME Equipment being ordered: TEDS stocking   Below the knee 15-20 mg  Dispense 2  Use daily   Noam Jackson MD   insulin pen needle (ULTICARE MINI) 31G X 6 MM Use daily or as directed.   Noam Jackson MD   ORDER FOR DME Equipment being ordered: Compression stockings, 20-30 MMHG, knee high   Noam Jackson MD   ASPIRIN NOT PRESCRIBED, INTENTIONAL, 1 each continuous prn Antiplatelet medication not prescribed intentionally due to current nosebleeds   Ramila Guerrero MD         Medication history completed by: Jermoe Salas, Student Pharmacist
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
## 2017-11-02 NOTE — PLAN OF CARE
Problem: Patient Care Overview  Goal: Plan of Care/Patient Progress Review  Discharge Planner OT   Patient plan for discharge: home with PCA services from daughter  Current status: CGA for bed mobility, Rosita for sit<>stand transfer at EOB, needs increased assist for pivot transfer to and from commode, pt completed  strengthening exercises and was educated on use of adaptive equipment to increase independence in home environment.   Barriers to return to prior living situation: Pt will need to perform safe shower transfer and bathing/toileting task.   Recommendations for discharge: Home with assist and  OT for safety eval.   Rationale for recommendations: To increase safety and independence with ADLs/IADLs and functional mobility.        Entered by: Hyacinth Colon 11/02/2017 2:36 PM

## 2017-11-02 NOTE — PLAN OF CARE
Problem: Patient Care Overview  Goal: Individualization & Mutuality  Outcome: Therapy, progress toward functional goals is gradual     AVSS. No confusion noted, calling appropriately.   Has declined pain meds for R leg tenderness. RLE redness receding, still warm and tender to touch, 2-3+ edema. Able to bear weight to stand and pivot to commode at bedside with assist of 2. CMS intact RLE.   Voiding adequate amt, urine sent to lab.  Tylenol given for headache.  Repositioned off coccyx which has a suspected pressure ulcer (pink area to left of coccyx). Able to reposition self with minimal help.  WOC consult today, needs lymphedema consult.
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
## 2017-11-03 ENCOUNTER — APPOINTMENT (OUTPATIENT)
Dept: OCCUPATIONAL THERAPY | Facility: CLINIC | Age: 67
DRG: 603 | End: 2017-11-03
Payer: MEDICARE

## 2017-11-03 LAB
ANION GAP SERPL CALCULATED.3IONS-SCNC: 8 MMOL/L (ref 3–14)
BACTERIA SPEC CULT: NO GROWTH
BASOPHILS # BLD AUTO: 0 10E9/L (ref 0–0.2)
BASOPHILS NFR BLD AUTO: 0.1 %
BUN SERPL-MCNC: 42 MG/DL (ref 7–30)
C DIFF TOX B STL QL: NEGATIVE
CALCIUM SERPL-MCNC: 7.5 MG/DL (ref 8.5–10.1)
CHLORIDE SERPL-SCNC: 114 MMOL/L (ref 94–109)
CO2 SERPL-SCNC: 22 MMOL/L (ref 20–32)
CREAT SERPL-MCNC: 3.19 MG/DL (ref 0.52–1.04)
CRP SERPL-MCNC: 160 MG/L (ref 0–8)
DIFFERENTIAL METHOD BLD: ABNORMAL
EOSINOPHIL # BLD AUTO: 0.2 10E9/L (ref 0–0.7)
EOSINOPHIL NFR BLD AUTO: 2.1 %
ERYTHROCYTE [DISTWIDTH] IN BLOOD BY AUTOMATED COUNT: 14.4 % (ref 10–15)
GFR SERPL CREATININE-BSD FRML MDRD: 14 ML/MIN/1.7M2
GLUCOSE BLDC GLUCOMTR-MCNC: 104 MG/DL (ref 70–99)
GLUCOSE BLDC GLUCOMTR-MCNC: 138 MG/DL (ref 70–99)
GLUCOSE BLDC GLUCOMTR-MCNC: 140 MG/DL (ref 70–99)
GLUCOSE BLDC GLUCOMTR-MCNC: 73 MG/DL (ref 70–99)
GLUCOSE BLDC GLUCOMTR-MCNC: 77 MG/DL (ref 70–99)
GLUCOSE BLDC GLUCOMTR-MCNC: 80 MG/DL (ref 70–99)
GLUCOSE SERPL-MCNC: 96 MG/DL (ref 70–99)
HCT VFR BLD AUTO: 22.6 % (ref 35–47)
HGB BLD-MCNC: 7 G/DL (ref 11.7–15.7)
IMM GRANULOCYTES # BLD: 0.1 10E9/L (ref 0–0.4)
IMM GRANULOCYTES NFR BLD: 0.6 %
LYMPHOCYTES # BLD AUTO: 1.4 10E9/L (ref 0.8–5.3)
LYMPHOCYTES NFR BLD AUTO: 14.4 %
Lab: NORMAL
MAGNESIUM SERPL-MCNC: 2 MG/DL (ref 1.6–2.3)
MCH RBC QN AUTO: 27.2 PG (ref 26.5–33)
MCHC RBC AUTO-ENTMCNC: 31 G/DL (ref 31.5–36.5)
MCV RBC AUTO: 88 FL (ref 78–100)
MONOCYTES # BLD AUTO: 0.5 10E9/L (ref 0–1.3)
MONOCYTES NFR BLD AUTO: 4.8 %
NEUTROPHILS # BLD AUTO: 7.3 10E9/L (ref 1.6–8.3)
NEUTROPHILS NFR BLD AUTO: 78 %
NRBC # BLD AUTO: 0 10*3/UL
NRBC BLD AUTO-RTO: 0 /100
PHOSPHATE SERPL-MCNC: 4.2 MG/DL (ref 2.5–4.5)
PLATELET # BLD AUTO: 213 10E9/L (ref 150–450)
POTASSIUM SERPL-SCNC: 3.3 MMOL/L (ref 3.4–5.3)
RBC # BLD AUTO: 2.57 10E12/L (ref 3.8–5.2)
SODIUM SERPL-SCNC: 144 MMOL/L (ref 133–144)
SPECIMEN SOURCE: NORMAL
SPECIMEN SOURCE: NORMAL
VANCOMYCIN SERPL-MCNC: 9.6 MG/L
WBC # BLD AUTO: 9.4 10E9/L (ref 4–11)

## 2017-11-03 PROCEDURE — 00000146 ZZHCL STATISTIC GLUCOSE BY METER IP

## 2017-11-03 PROCEDURE — A9270 NON-COVERED ITEM OR SERVICE: HCPCS | Mod: GY | Performed by: FAMILY MEDICINE

## 2017-11-03 PROCEDURE — 80202 ASSAY OF VANCOMYCIN: CPT | Performed by: FAMILY MEDICINE

## 2017-11-03 PROCEDURE — 40000133 ZZH STATISTIC OT WARD VISIT

## 2017-11-03 PROCEDURE — 25000132 ZZH RX MED GY IP 250 OP 250 PS 637: Mod: GY | Performed by: FAMILY MEDICINE

## 2017-11-03 PROCEDURE — 84100 ASSAY OF PHOSPHORUS: CPT | Performed by: FAMILY MEDICINE

## 2017-11-03 PROCEDURE — 25000128 H RX IP 250 OP 636: Performed by: FAMILY MEDICINE

## 2017-11-03 PROCEDURE — 80048 BASIC METABOLIC PNL TOTAL CA: CPT | Performed by: FAMILY MEDICINE

## 2017-11-03 PROCEDURE — 87493 C DIFF AMPLIFIED PROBE: CPT | Performed by: FAMILY MEDICINE

## 2017-11-03 PROCEDURE — 83735 ASSAY OF MAGNESIUM: CPT | Performed by: FAMILY MEDICINE

## 2017-11-03 PROCEDURE — 86140 C-REACTIVE PROTEIN: CPT | Performed by: FAMILY MEDICINE

## 2017-11-03 PROCEDURE — 97140 MANUAL THERAPY 1/> REGIONS: CPT | Mod: GO

## 2017-11-03 PROCEDURE — 36415 COLL VENOUS BLD VENIPUNCTURE: CPT | Performed by: FAMILY MEDICINE

## 2017-11-03 PROCEDURE — 85025 COMPLETE CBC W/AUTO DIFF WBC: CPT | Performed by: FAMILY MEDICINE

## 2017-11-03 PROCEDURE — 12000008 ZZH R&B INTERMEDIATE UMMC

## 2017-11-03 RX ORDER — FUROSEMIDE 10 MG/ML
60 INJECTION INTRAMUSCULAR; INTRAVENOUS
Status: DISCONTINUED | OUTPATIENT
Start: 2017-11-04 | End: 2017-11-04

## 2017-11-03 RX ORDER — PIPERACILLIN SODIUM, TAZOBACTAM SODIUM 2; .25 G/10ML; G/10ML
2.25 INJECTION, POWDER, LYOPHILIZED, FOR SOLUTION INTRAVENOUS EVERY 8 HOURS
Status: DISCONTINUED | OUTPATIENT
Start: 2017-11-03 | End: 2017-11-09

## 2017-11-03 RX ORDER — FUROSEMIDE 20 MG
80 TABLET ORAL 2 TIMES DAILY
Status: DISCONTINUED | OUTPATIENT
Start: 2017-11-03 | End: 2017-11-03

## 2017-11-03 RX ORDER — POTASSIUM CHLORIDE 750 MG/1
40 TABLET, EXTENDED RELEASE ORAL 2 TIMES DAILY
Status: COMPLETED | OUTPATIENT
Start: 2017-11-03 | End: 2017-11-03

## 2017-11-03 RX ORDER — FUROSEMIDE 10 MG/ML
60 INJECTION INTRAMUSCULAR; INTRAVENOUS ONCE
Status: COMPLETED | OUTPATIENT
Start: 2017-11-03 | End: 2017-11-03

## 2017-11-03 RX ADMIN — HEPARIN SODIUM 7500 UNITS: 10000 INJECTION, SOLUTION INTRAVENOUS; SUBCUTANEOUS at 06:32

## 2017-11-03 RX ADMIN — POTASSIUM CHLORIDE 40 MEQ: 750 TABLET, EXTENDED RELEASE ORAL at 19:27

## 2017-11-03 RX ADMIN — PIPERACILLIN AND TAZOBACTAM 2.25 G: 2; .25 INJECTION, POWDER, LYOPHILIZED, FOR SOLUTION INTRAVENOUS; PARENTERAL at 08:26

## 2017-11-03 RX ADMIN — CARVEDILOL 25 MG: 6.25 TABLET, FILM COATED ORAL at 08:21

## 2017-11-03 RX ADMIN — MICONAZOLE NITRATE: 2 POWDER TOPICAL at 19:27

## 2017-11-03 RX ADMIN — ATORVASTATIN CALCIUM 20 MG: 20 TABLET, FILM COATED ORAL at 08:21

## 2017-11-03 RX ADMIN — AMLODIPINE BESYLATE 10 MG: 10 TABLET ORAL at 08:21

## 2017-11-03 RX ADMIN — FUROSEMIDE 80 MG: 20 TABLET ORAL at 09:02

## 2017-11-03 RX ADMIN — Medication 1 CAPSULE: at 19:27

## 2017-11-03 RX ADMIN — CARVEDILOL 25 MG: 6.25 TABLET, FILM COATED ORAL at 17:28

## 2017-11-03 RX ADMIN — DULOXETINE HYDROCHLORIDE 30 MG: 30 CAPSULE, DELAYED RELEASE ORAL at 08:21

## 2017-11-03 RX ADMIN — DULOXETINE HYDROCHLORIDE 30 MG: 30 CAPSULE, DELAYED RELEASE ORAL at 19:29

## 2017-11-03 RX ADMIN — ONDANSETRON 4 MG: 2 INJECTION INTRAMUSCULAR; INTRAVENOUS at 04:38

## 2017-11-03 RX ADMIN — VANCOMYCIN HYDROCHLORIDE 1250 MG: 10 INJECTION, POWDER, LYOPHILIZED, FOR SOLUTION INTRAVENOUS at 13:56

## 2017-11-03 RX ADMIN — INSULIN ASPART 6 UNITS: 100 INJECTION, SOLUTION INTRAVENOUS; SUBCUTANEOUS at 12:22

## 2017-11-03 RX ADMIN — POTASSIUM CHLORIDE 40 MEQ: 750 TABLET, EXTENDED RELEASE ORAL at 09:01

## 2017-11-03 RX ADMIN — Medication 1 CAPSULE: at 08:21

## 2017-11-03 RX ADMIN — HEPARIN SODIUM 7500 UNITS: 10000 INJECTION, SOLUTION INTRAVENOUS; SUBCUTANEOUS at 13:56

## 2017-11-03 RX ADMIN — ACETAMINOPHEN 650 MG: 325 TABLET, FILM COATED ORAL at 15:48

## 2017-11-03 RX ADMIN — PIPERACILLIN AND TAZOBACTAM 2.25 G: 2; .25 INJECTION, POWDER, LYOPHILIZED, FOR SOLUTION INTRAVENOUS; PARENTERAL at 16:19

## 2017-11-03 RX ADMIN — MICONAZOLE NITRATE: 2 POWDER TOPICAL at 08:30

## 2017-11-03 RX ADMIN — INSULIN GLARGINE 35 UNITS: 100 INJECTION, SOLUTION SUBCUTANEOUS at 22:44

## 2017-11-03 RX ADMIN — ALBUTEROL SULFATE 2 PUFF: 90 AEROSOL, METERED RESPIRATORY (INHALATION) at 19:42

## 2017-11-03 RX ADMIN — HEPARIN SODIUM 7500 UNITS: 10000 INJECTION, SOLUTION INTRAVENOUS; SUBCUTANEOUS at 22:44

## 2017-11-03 RX ADMIN — FUROSEMIDE 60 MG: 10 INJECTION, SOLUTION INTRAVENOUS at 14:22

## 2017-11-03 RX ADMIN — SODIUM CHLORIDE: 9 INJECTION, SOLUTION INTRAVENOUS at 04:38

## 2017-11-03 RX ADMIN — PIPERACILLIN AND TAZOBACTAM 2.25 G: 2; .25 INJECTION, POWDER, LYOPHILIZED, FOR SOLUTION INTRAVENOUS; PARENTERAL at 02:53

## 2017-11-03 RX ADMIN — ACETAMINOPHEN 650 MG: 325 TABLET, FILM COATED ORAL at 19:29

## 2017-11-03 ASSESSMENT — ENCOUNTER SYMPTOMS
HEADACHES: 0
SHORTNESS OF BREATH: 0
NAUSEA: 0
COUGH: 0
LIGHT-HEADEDNESS: 0
CHILLS: 0
RHINORRHEA: 0
DIARRHEA: 0
ARTHRALGIAS: 1
DYSURIA: 0
FEVER: 0
ABDOMINAL PAIN: 0
CONSTIPATION: 0
MYALGIAS: 1
BACK PAIN: 1
VOMITING: 0
SORE THROAT: 0

## 2017-11-03 ASSESSMENT — PAIN DESCRIPTION - DESCRIPTORS
DESCRIPTORS: SORE
DESCRIPTORS: SORE

## 2017-11-03 NOTE — PROVIDER NOTIFICATION
Marquita's paged at [phone] re: RLE (thigh to knee) with increased firmness, discomfort.    MD came to see pt, no new orders, will try to reposition off RLE and hip, continue to monitor and notify of any changes.

## 2017-11-03 NOTE — PLAN OF CARE
"Problem: Patient Care Overview  Goal: Individualization & Mutuality  Outcome: No Change     R thigh with increasing firmness/redness towards knee, pt states it feels \"tighter\". Tender to touch but declined pain meds. MD paged, assessed, will continue to monitor and encourage pp to reposition off R hip to see if that helps.   Zofran IV for nausea, whole dose not given as pt felt woozy when it was given. Try PO med if needed.  Voiding, had loose BM. Up to commode with assist of 2.  Mepilex intact on coccyx.     0630- R lower leg (knee to ankle) appears a little more swollen.   Family Medicine paged, spoke with Brayden Carrasco MD, new orders received- stop IVF, start new Lasix dose.      "

## 2017-11-03 NOTE — PLAN OF CARE
Problem: Patient Care Overview  Goal: Plan of Care/Patient Progress Review  OT 7C: cancel, pt requesting Th return later in day upon AM attempt, pt with other providers upon PM attempt, unable to check back in with schedule demands, will reschedule.

## 2017-11-03 NOTE — PLAN OF CARE
Problem: Patient Care Overview  Goal: Plan of Care/Patient Progress Review  Outcome: Improving  A&Ox4, AVSS. Pt c/o headache, scheduled tylenol given, relief reported. Pt denies nausea, tolerating moderate carb diet. R leg redness improving, warm to touch. Pt with wound to L of coccyx, mepilex c/d/i, pt repositioning q2h. Pt's BG was in 300s, sliding scale insulin and 6 units added insulin given per MD order. Pt forgets to notify staff for BG checks before she eats, writer reinforced teaching on BG control. Pt with scant urine output this shift, diuretic given as scheduled, toileting encouraged. Pt had BM, passing flatus. Continue POC.

## 2017-11-03 NOTE — PROGRESS NOTES
[facility] - Inpatient daily progress note      Date of admission: 11/1/2017  Date of service: 11/3/2017       Assessment and Plan:   Assessment:   Supriya Herr is a 67 year old female with a significant past medical history of chronic kidney disease stage 3, congestive heart failure, diabetes, HTN, HLD, chronic lymphedema, JONE and morbid obesity admitted for RLE cellulitis.  Patient Active Problem List   Diagnosis     Vitiligo     Traumatic amputation of leg above knee (H)     Contact dermatitis and other eczema, due to unspecified cause     Dermatophytosis of nail     Generalized osteoarthrosis, unspecified site     Hypertension goal BP (blood pressure) < 140/90     Mild nonproliferative diabetic retinopathy (H)     Proteinuria     Stage I pressure ulcer     Hyperlipidemia LDL goal <100     Non compliance with medical treatment     Tobacco abuse     Advanced directives, counseling/discussion     Incontinence of urine     Basal cell carcinoma     Senile nuclear sclerosis     JONE (obstructive sleep apnea)     CHF (congestive heart failure) (H)     Health Care Home     CKD (chronic kidney disease) stage 3, GFR 30-59 ml/min     Type 2 diabetes, HbA1C goal < 8% (H)     Type 2 diabetes mellitus with diabetic chronic kidney disease (H)     Morbid obesity (H)     Type 2 diabetes mellitus with stage 3 chronic kidney disease, without long-term current use of insulin (H)     Moderate recurrent major depression (H)     Type 2 diabetes mellitus with diabetic neuropathy, with long-term current use of insulin (H)     Cellulitis      Plan:    ## Right thigh Cellulitis: stable  ## Chronic lymphedema   Unclear bacterial entry point.  Of note patient has right pelvis metal plate present. Significantly improved erythema, pain from admission, but no change from yesterday on broad spectrum abx. Swelling worsening  - WBC, inflammatory markers downtrending, however, given worsening swelling and stable erythema,  will continue current broad spectrum antibiotics and monitor for now  - Follow up blood cultures given infection + hardware in pelvis  - PO pain control  - Offload the right thigh with frequent repositioning   - Continue Vancomycin and Zosyn  - Lactobacillus for C.diff and antibiotic associated diarrhea prophylaxis      ##  CKD stage 3 - baseline creatinine 1.8-2.1 from earlier this year, but graphing 1/creatinine vs. Time shows progressive decline of renal function which has been progressing faster over last 12 months.  Likely worsening CKD rather than LORI on CKD.  Fractional excretion of urea 45 suggesting intrinsic renal etiology.  Patient appears euvolemic and no evidence of sepsis.   - IV lock  - Continue to lisinopril 40mg BID   - Resume tolterodine 2mg   - Avoid nephrotoxic meds when possible, renally dose medications  - Resume home lasix 80mg BID (or 40mg in PM if patient insists)  - strict I/O's and daily weights       ##Diabetes type 2   Uncontrolled diabetes with last Hgb A1c 9.2 (9/13/17). She follows with endocrinology for her diabetes. Daughter reports non-adherence to current diabetic regimen and that she frequently forgets meal coverage.  When patient received 35 units lantus and 6 units aspart yesterday AM she became hypoglycemic to 50's, home dose may be too high for her and outpatient insulin regimen may have been increased despite non-adherence.  - Home regimen: Lantus 70u at bedtime, Novolog 6U with meals and sliding scale  - Hospital regimen: Lantus 35u at bedtime, Novolog 6U with meals and MSSI  - Hypoglycemia protocol  - Moderate consistent carbohydrate diet     ##HTN  - Continue carvedilol 25mg BID  - Continue amlodipine 10mg daily   - hold lisinopril 80mg BID (unclear why this dosage or frequency)  - Hydralazine 10mg PRN BP >180/110     ##HFpEF  Last echocardiogram 12/2013 with normal LV function but technically difficulty study due to patient's obesity.  HFpEF documented in chart, but no  impaired diastolic filling on echo  - continue carvedilol 25mg BID  - Hold lisinopril in setting of LORI  - consider repeat echocardiogram as outpatient     ##Normocytic Anemia- Stable  Hgb is stable at 9.4 with baseline ~9-10. She has a history of iron deficiency anemia.   - Consider iron studies and iron supplementation as outpatient      ##Neuropathy  - continue cymbalta 30mg BID     ##HLD  - continue lipitor 20mg daily     ##JONE  - Refuses use of CPAP since she is unable to tolerate   - Continue to encourage CPAP use, but not ordered currently     ##Stage 1 pressure ulcer on coccyx   - WOC consulted     ## Physical deconditioning  - PT/OT consult           Fluids:IVL  Electrolytes:WNL, will monitor  Nutrition:Moderate carb control diet  Lines:PIV  Activity: -Up as tolerated, PT, OT  CODE: Full Code  Prophylaxis: Heparin SQ  PCP communication:  - Noam Jackson  - Contacted at admission: No  - Concerns or updates: N/A  Dispo: Expected discharge date 1-2 days pending clinical improvement              Interval History:   Supriya Herr says her right thigh still hurts.  Had worsening redness and swelling overnight, but she thinks this is in part due to the fact that she was lying on it, and also she has been on fluids.  Leg hurts more than yesterday and redness stable today. No fevers or chills.  No drainage            Review of Systems:   Review of Systems   Constitutional: Negative for chills and fever.   HENT: Negative for rhinorrhea and sore throat.    Respiratory: Negative for cough and shortness of breath.    Cardiovascular: Negative for chest pain.   Gastrointestinal: Negative for abdominal pain, constipation, diarrhea, nausea and vomiting.   Genitourinary: Negative for dysuria.   Musculoskeletal: Positive for arthralgias, back pain and myalgias.   Neurological: Negative for light-headedness and headaches.            Physical Exam (Resident / Clinician):     Vitals were reviewed  Patient Vitals  for the past 24 hrs:   BP Temp Temp src Pulse Heart Rate Resp SpO2 Weight   11/03/17 0433 133/48 96.9  F (36.1  C) Oral - 73 17 96 % -   11/02/17 2245 124/40 98  F (36.7  C) Oral 71 - 15 94 % -   11/02/17 1824 128/40 - - 70 - - - -   11/02/17 1450 116/42 96  F (35.6  C) Oral 71 - 18 92 % -   11/02/17 0900 - - - - - - - 102.4 kg (225 lb 12.8 oz)       Physical Exam   Constitutional: She is oriented to person, place, and time. She appears well-developed and well-nourished. No distress.   HENT:   Head: Normocephalic and atraumatic.   Neck: Neck supple.   Cardiovascular: Normal rate, regular rhythm and normal heart sounds.    No murmur heard.  Pulmonary/Chest: Effort normal. No respiratory distress. She has no wheezes. She has no rales.   Abdominal: Soft. She exhibits no distension. There is tenderness (RUQ and epigastric). There is no rebound and no guarding.   Musculoskeletal: She exhibits edema (2+ pitting edema right lower extremity).   Tenderness along right lateral thigh from hip to knee over erythematous area. Midline back lumbar tenderness.  S/P Left AKA, well healed, no erythema or swelling   Neurological: She is alert and oriented to person, place, and time.   Skin: Skin is warm and dry.   Mild erythema right lateral thigh from hip to just below knee.  Erythema is within marked margins and lighter pink color than prior exam.  Left leg with venous stasis changes below the knee. Anteriorly there is a small 0.5cm eschar without any fluctuance, drainage, surrounding erythema.  Feet are dry, large callous on ball of right foot.  Toes mis-aligned, no overt skin breaks/wounds between toes, but skin is dry and cracking. No change from yesterday's exam   Psychiatric: She has a normal mood and affect.     I/O last 3 completed shifts:  In: 3341.67 [P.O.:840; I.V.:2501.67]  Out: 1100 [Urine:400; Other:700]            Data:   ROUTINE LABS (Last four results)  CMP    Recent Labs  Lab 11/03/17  0601 11/02/17  0828  11/01/17 2135 11/01/17  1414    140  --  139   POTASSIUM 3.3* 3.8  --  3.6   CHLORIDE 114* 109  --  106   CO2 22 25  --  24   ANIONGAP 8 6  --  9   GLC 96 92  --  245*   BUN 42* 39*  --  38*   CR 3.19* 2.92* 2.82* 2.86*   GFRESTIMATED 14* 16* 17* 16*   GFRESTBLACK 17* 19* 20* 20*   JODY 7.5* 7.9*  --  8.4*   MAG 2.0  --   --   --    PHOS 4.2  --   --   --    PROTTOTAL  --   --   --  7.2   ALBUMIN  --   --   --  1.9*   BILITOTAL  --   --   --  0.3   ALKPHOS  --   --   --  79   AST  --   --   --  7   ALT  --   --   --  15     CBC    Recent Labs  Lab 11/03/17  0601 11/02/17  0828 11/01/17 2135 11/01/17  1414   WBC 9.4 14.5*  --  18.0*   RBC 2.57* 2.83*  --  3.32*   HGB 7.0* 7.7*  --  9.4*   HCT 22.6* 24.8*  --  28.7*   MCV 88 88  --  86   MCH 27.2 27.2  --  28.3   MCHC 31.0* 31.0*  --  32.8   RDW 14.4 13.9  --  13.5    208 251 269     INRNo lab results found in last 7 days.  CRP    Recent Labs  Lab 11/03/17  0601 11/02/17  0828 11/01/17  1414   .0* 220.0* 138.0*         Blood culture:  Invalid input(s): BC   Urine culture:  No results for input(s): URC in the last 168 hours.  All cultures:    Recent Labs  Lab 11/02/17  0600 11/01/17  1550 11/01/17  1414   CULT Culture negative monitoring continues No growth after 2 days No growth after 2 days           Medications:     Current Facility-Administered Medications   Medication     furosemide (LASIX) tablet 80 mg     hydrALAZINE (APRESOLINE) injection 10 mg     loratadine (CLARITIN) tablet 10 mg     miconazole (MICATIN; MICRO GUARD) 2 % powder     lactobacillus rhamnosus (GG) (CULTURELL) capsule 1 capsule     heparin injection 7,500 Units     vancomycin place cardoso - receiving intermittent dosing     albuterol (PROAIR HFA/PROVENTIL HFA/VENTOLIN HFA) Inhaler 2 puff     amLODIPine (NORVASC) tablet 10 mg     atorvastatin (LIPITOR) tablet 20 mg     carvedilol (COREG) tablet 25 mg     DULoxetine (CYMBALTA) EC capsule 30 mg     insulin aspart (NovoLOG) inj  (RAPID ACTING)     naloxone (NARCAN) injection 0.1-0.4 mg     polyethylene glycol (MIRALAX/GLYCOLAX) Packet 17 g     ondansetron (ZOFRAN-ODT) ODT tab 4 mg    Or     ondansetron (ZOFRAN) injection 4 mg     glucose 40 % gel 15-30 g    Or     dextrose 50 % injection 25-50 mL    Or     glucagon injection 1 mg     insulin aspart (NovoLOG) inj (RAPID ACTING)     insulin aspart (NovoLOG) inj (RAPID ACTING)     insulin glargine (LANTUS) injection 35 Units     piperacillin-tazobactam (ZOSYN) 2.25 g vial to attach to  ml bag     acetaminophen (TYLENOL) tablet 650 mg     oxyCODONE (ROXICODONE) IR tablet 5 mg       Caring Physician: Brayden Carrasco MD  [facility]  Pager Contact: see Physician sticky note

## 2017-11-03 NOTE — PLAN OF CARE
Problem: Patient Care Overview  Goal: Plan of Care/Patient Progress Review  Edema 7C: Eval complete. Pt with lymphedema stage 2 in R LE, drawn on border on upper thigh with pinkness within borders. GCB applied from MTP to knee crease on R LE. Plan for wrap to be worn x 24-48 hours, though please remove if wrap causes numbness, tingling, or pain.      Recommend follow up with HH lymphedema for fit into compression garment.

## 2017-11-03 NOTE — PROGRESS NOTES
11/03/17 0913   General Information   Discipline OT   Onset of Edema (acute on chronic)   Affected Body Part(s) Right LE   Etiology Comments acute cellulitis, CKD 3, CHF, lymphedema   Pertinent history of current problem (PT: include personal factors and/or comorbidities that impact the POC; OT: include additional occupational profile info) Supriya Herr is a 67 year old female with a significant past medical history of chronic kidney disease stage 3, congestive heart failure, diabetes, HTN, HLD, chronic lymphedema, JONE and morbid obesity admitted for RLE cellulitis.   Special Tests for Edema Related Problem Ultrasound  ((-) on 11/1)   Edema Precautions Acute infection   Edema Precaution Comments on antibiotics   Pain   Patient currently in pain Yes, see Vital Signs flowsheet   Edema Examination / Assessment   Skin Condition Pitting;Non-pitting;Dryness   Skin Condition Comments Pt with drawn on borders on lateral side of upper thigh with pinkness within borders. Skin taut and firm in this area. Pt with red/shiny skin on foot, healing scabs on R digit 3 + 4, eraser sized scab near +20 that pt states has weeped lymph fluid in the past. Skin dusky brown in tone, dryness on R heel, callouses on bottom of foot, and various pinprick scabs on leg. Skin taut with 1+/2+ pitting from ankle to knee crease, non pitting edema in foot. Toes broken from accident and some digits sitting in odd angles. Foot with splotches of brown discoloration.   Capillary Refill Symmetrical   Dorsal Pedal Pulse Symmetrical   Girth Measurements   Girth Measurements Refer to separate Girth Measurement flowsheet   Sensation   Sensation (WFL) (neuropathy in UEs, not in LEs)   Assessment/Plan   Patient presents with Stage 2 Lymphedema   Assessment Pt to benefit from skilled OT to manage R LE edema to promote skin integrity, comfort, and increase I/ease with ADLs. See daily flowsheet for details regarding today's treatment.    Assessment of  Occupational Performance 3-5 Performance Deficits   Identified Performance Deficits functional transfers, dressing, toileting, home management   Clinical Decision Making (Complexity) Low complexity   Planned Edema Interventions Gradient compression bandaging;Exercises;Fit for compression garment;Precautions to prevent infection/exacerbation;Education;ADL training   Treatment Frequency 5 times/wk  (BID OT/edema)   Treatment Duration 1 week   Patient, Family and/or Staff in agreement with plan of care. Yes   Risks and benefits of treatment have been explained. Yes   Total Evaluation Time   Total Evaluation Time (Minutes) 4

## 2017-11-03 NOTE — PLAN OF CARE
Problem: Patient Care Overview  Goal: Plan of Care/Patient Progress Review  VSS. Reports minimal pain. However declines pain medications. Up with 2 assist. Patient has history of L BKA. R thigh ethyema remains unchanged and within borders. Voiding spontaneously. Enteric isolation. Patient has had 4 loose stools today. C diff still needs to be collected.  BG 77 and 122 today. Will continue to monitor for s/s of hypoglycemia. Saline Lock in-between antibiotics. Received IV lasix and po lasix x 1. Resting in-between cares. PLAN:  Collect c diff sample and continue with current plan of care.

## 2017-11-03 NOTE — PLAN OF CARE
Problem: Patient Care Overview  Goal: Plan of Care/Patient Progress Review  PT evaluation cx and deferred.  Patient nonambulatory, OT to evaluate and follow for ADL, transfers and basic mobility needs with no additional PT needs to address functional mobility.  Will complete PT order.

## 2017-11-03 NOTE — PROGRESS NOTES
Care Coordinator Progress Note     Admission Date/Time:  11/1/2017  Attending MD:  Shyam Barcenas MD     Data  Chart reviewed, discussed with interdisciplinary team.   Patient was admitted for: Cellulitis of right leg.    Concerns with insurance coverage for discharge needs: None.  Current Living Situation: Pt lives in a duplex on the bottom floor states her daughter and her SO live on the top floor with her 3 y/o grandson. A ramp is presnet in front of the home and all needs can be met on the main level. Patiens wheelchair is at home, daughter will bring it at d/c.  Support System: Supportive and Involved  Services Involved: PCA (daughter is PCA)  Transportation: daughter will provide transport  Barriers to Discharge: improvement in cellulitits    Coordination of Care  RNCC met with patient at bed side to introduce and explain role in discharge planning.  Patient was unable to recall how many hours of PCA care daughter is authorized to provide nor her county worker information. She requested I speak to her daughter. RNCC provided patient with options for Home Care - patient requested I speak with her daughter to selection an agency and check benfits. RNCC was unable to connect with patients daughter as she was busy as work on a conference call, tried calling again an hour later and was unable to reach her. Home with on oral antibiotic.    Patient needs Home Care Lymphedema and Home WOC RN, patient does not want HH PT.     11/3@1600 Daughter called and chose [facility]; benefit check called in.    [facility]  Phone  [phone]  Fax  [phone]    RN skilled nursing visit.   RN to assess respiratory and cardiac status, pain level and activity tolerance, wound for increased signs/symptoms of infection, hydration, nutrition and bowel status and home safety.  RN to provide and teach medication management and set-up every week.  RN to provide and teach wound care 2-3 times per week.    OT to provide  lymphedema treatment 2-3 times per week    Your provider has ordered home care nursing services.   If you have not been contacted within 2 days of your discharge please call the inpatient department phone number at [phone] .     Plan  Anticipated Discharge Date:  Over the weekend  Anticipated Discharge Plan:  Home with Home Care      Hellen DE LEÓNN RN PHN  Patient Care Management Coordinator for    [facility], [facility], and [facility]A & 5B Off Service Patients  [facility]  Phone: [phone]  Pager: [phone]

## 2017-11-03 NOTE — PROGRESS NOTES
[facility], [place]   Antimicrobial Management Team (AMT) Note              To: [facility]  Unit: 7C  Allergies   Allergen Reactions     Nkda [No Known Drug Allergies]        Brief Summary: Supriya Herr is a 67 year old female with a significant past medical history of chronic kidney disease stage 3, congestive heart failure, diabetes, HTN, HLD, chronic lymphedema, JONE and morbid obesity (BMI 36.5) who presents on 11/1 with right leg swelling, erythema and warmth for the past 2 days.     HPI: Patient reported right lower leg swelling and weeping clear drainage that started 1 week ago. Yesterday, she experienced right upper leg swelling and erythema that progressively worsen and presented to our ED for evaluation. She also reported fevers of 103 with associated chills and abdominal pain, vomiting and nausea that started 6 days ago. Her WBC on admission was 18, ANC 15.5, T 99.4 RR 20, /56, and CRP of 138. Since admission, her WBC has trended down and today is 9.4, ANC 7.3, T 98.6, RR 20, . A MRSA nares PCR was negative on 11/2 and there has been NGTD of blood and urine cultures. Per physician note, the patient states the pain is a lot better and endorses improvement in erythema and swelling.       Assessment:   Cellulitis: Patient with no history of MRSA, no abscess or fluid collection observed on physical exam and CT imaging, and negative MRSA nares PCR, which makes MRSA infection unlikely. Patient did present with clear drainage but it wasn t purulent and no purulent drainage has been noticed upon examination. Only blood and urine culture were drawn and have been NGTD. Therefore, MRSA coverage is likely not needed and we can discontinue the vancomycin. In addition, without evidence of deeper soft tissue involvement and no abscess, there is no need to cover for Gram negative or anaerobic organisms and Zosyn can soon be de-escalated to target MSSA and Streptococci  species. Because the patient does possess several risk factors such as diabetes, chronic lymphedema, and morbid obesity, we can continue Zosyn for another day to ensure clinical improvement before de-escalating. Cefazolin would provide adequate coverage in this patient and would also provide an effective oral alternative if the patient is going to continue therapy as outpatient. Duration of therapy will depend on resolution of cellulitis.        Recommendation/Interventions:   1. Discontinue vancomycin  2. Can continue Zosyn for 1 more day but de-escalate to cefazolin 1 gram Q12h (renally dosed) if the patient continues to improve. Duration of therapy depending on resolution of cellulitis.     Discussed with ID Staff - Dr. Sawyer Thakakr and Carleen Cunningham, PharmD  Ming Goyal, PharmD    Current Anti-infective Orders:  Anti-infectives (Future)    Start     Dose/Rate Route Frequency Ordered Stop    11/03/17 1626  piperacillin-tazobactam (ZOSYN) 2.25 g vial to attach to  ml bag      2.25 g  over 30 Minutes Intravenous EVERY 8 HOURS 11/03/17 0955      11/02/17 1543  vancomycin place cardoso - receiving intermittent dosing      1 each Does not apply SEE ADMIN INSTRUCTIONS 11/02/17 1544            Vitals and other clinical features  Vitals       11/01 0700  -  11/02 0659 11/02 0700  -  11/03 0659 11/03 0700  -  11/03 1611   Most Recent    Temp ( F) 97.8 -  99.4    96 -  98    97.8 -  98.8     98.8 (37.1)    Pulse 78 -  85    70 -  71      77     77    Heart Rate 75 -  89      73    73 -  79     79    Resp 16 -  (!)37    15 -  18      20     20    /58 -  173/62    116/42 -  133/48    142/50 -  156/49     142/50    SpO2 (%) 93 -  96    92 -  96    94 -  97     94        Temperature curve:      Labs  Estimated Creatinine Clearance: 20.3 mL/min (based on Cr of 3.19).  Recent Labs   Lab Test  11/03/17   0601  11/02/17   0828  11/01/17   2135  11/01/17   1414  09/21/17   1439  06/09/17   1238   CR  3.19*  2.92*  2.82*   2.86*  2.42*  2.11*       Recent Labs   Lab Test  11/03/17   0601  11/02/17   0828  11/01/17   2135  11/01/17   1414  09/21/17   1439  03/22/17   0803  02/14/17   1331   06/11/15   1400  06/20/14   1506  02/13/14   1122   12/30/13   1500   WBC  9.4  14.5*   --   18.0*  6.6   --    --    --   6.3  7.6  7.3   < >  7.6   ANEU  7.3  11.9*   --   15.5*   --    --    --    --    --   4.7  4.6   --   5.7   ALYM  1.4  1.5   --   1.3   --    --    --    --    --   1.7  1.8   --   1.1   JOANNE  0.5  0.9   --   1.0   --    --    --    --    --   0.7  0.6   --   0.6   AEOS  0.2  0.1   --   0.1   --    --    --    --    --   0.4  0.2   --   0.2   HGB  7.0*  7.7*   --   9.4*  9.7*  10.2*  9.7*   < >  12.0  10.6*  9.5*   < >  9.4*   HCT  22.6*  24.8*   --   28.7*  29.6*   --    --    --   36.8  34.1*  31.2*   < >  31.9*   MCV  88  88   --   86  87   --    --    --   84  85  78   < >  85   PLT  213  208  251  269  290   --    --    --   281  324  323   < >  373    < > = values in this interval not displayed.       Recent Labs   Lab Test  11/01/17   1414  09/21/17   1439  06/09/17   1238  03/22/17   0803  02/14/17   1331  10/26/16   1211   01/21/16   1548   02/20/15   1139  12/30/13   1500   BILITOTAL  0.3  0.2  0.4   --    --    --    --   <0.1*   --   0.3  <0.1*   ALKPHOS  79  71  72   --    --    --    --   68   --   76  71   ALBUMIN  1.9*  2.5*  2.5*  2.6*  2.6*  2.0*   < >  3.0*   < >  3.0*  3.2*   AST  7  12  14   --    --    --    --   9   --   10  18   ALT  15  17  26   --    --    --    --   15   --   19  23    < > = values in this interval not displayed.       Recent Labs   Lab Test  11/03/17   0601  11/02/17   0828  11/01/17   1550  11/01/17   1414   LACT   --    --   0.6*   --    CRP  160.0*  220.0*   --   138.0*   SED   --    --    --   119*     Recent Labs   Lab Test  11/03/17   0601   VANCOMYCIN  9.6       Culture results with specimen source  Culture Micro   Date Value Ref Range Status   11/02/2017 No growth  Final    11/01/2017 No growth after 2 days  Preliminary   11/01/2017 No growth after 2 days  Preliminary   10/26/2016 (A)  Final    50,000 to 100,000 colonies/mL Escherichia coli  10,000 to 50,000 colonies/mL Beta hemolytic Streptococcus group B Beta Hemolytic   Streptococcus groups A and B are susceptible to ampicillin, penicillin,   vancomycin, and the cephalosporins.  Susceptibility testing is not routinely   done on these organisms isolated from urine.     06/11/2015 (A)  Final    >100,000 colonies/mL Escherichia coli  10,000 to 50,000 colonies/mL mixed urogenital shree Susceptibility testing not   routinely done     12/05/2012   Final    50 to 100,000 colonies/mL Mixed gram positive shree    Specimen Description   Date Value Ref Range Status   11/02/2017 Midstream Urine  Final   11/02/2017 Nares  Final   11/01/2017 Blood Right Arm  Final   11/01/2017 Blood Left Arm  Final   10/26/2016 Midstream Urine  Final   06/11/2015 Midstream Urine  Final        Urine Studies     Recent Labs   Lab Test  11/02/17   0602  10/26/16   1220  06/11/15   1352  12/05/12   1541   URINEPH  7.5*  6.0  5.5  6.0   NITRITE  Negative  Negative  Negative  Negative   LEUKEST  Small*  Small*  Large*  Negative   WBCU  44*  54*  >182*   --        CSF Testing  No lab results found.    Respiratory Virus Testing    No results found for: RVSPEC, IFLUA, FLUAH1, FLUAH3, RX3071, IFLUB, RSVA, RSVB, PIV1, PIV2, PIV3, HMPV, HRVS, ADVBE, ADVC  Last check of C difficile  No results found for: CDBPCT    Imaging:  Ct Hip Right W/o Contrast    Result Date: 11/1/2017  CT HIP RIGHT WITHOUT CONTRAST 11/1/2017 5:02 PM COMPARISON: None. HISTORY: Rapidly spreading cellulitis of right lower extremity in the thigh.  Evaluate for necrotizing fasciitis. TECHNIQUE: Thin-section axial non-contrast CT images of the right thigh were acquired. Multiplanar reconstructions were created. FINDINGS: There are ORIF changes to the right acetabulum. There are severe degenerative changes  of the right hip joint. There are no fractures of the visualized bones. No aggressive bony lesions noted. There is increased density in the subcutaneous fat of the posterior and lateral right thigh extending from the level of the greater trochanter down to the inferior most image. These findings are consistent with cellulitis. There is no evidence for fluid collection or abscess anywhere in the right lower extremity. There is no subcutaneous gas. There is no evidence for fluid or thickening within the fascia around the muscular compartments. There is no evidence for fluid or inflammation within the muscular compartments.     IMPRESSION: Findings consistent with cellulitis of the right thigh. No findings to suggest fasciitis. No fractures. Radiation dose for this scan was reduced using automated exposure control, adjustment of the mA and/or kV according to patient size, or iterative reconstruction technique POORNIMA BOLAÑOS MD    Us Lower Extremity Venous Duplex Right    Result Date: 11/1/2017  US LOWER EXTREMITY VENOUS DUPLEX RIGHT11/1/2017 3:24 PM HISTORY: Pain and swelling, evaluate for DVT. TECHNIQUE:  Venous Doppler US.? Color flow and spectral Doppler with waveform analysis performed. FINDINGS: There is edema or thin ill-defined fluid at the lateral aspect of the right thigh. The peroneal veins are not well visualized. No evidence of lower extremity deep venous thrombosis.     IMPRESSION: No DVT identified. DAPHNEY STEVEN MD

## 2017-11-04 ENCOUNTER — APPOINTMENT (OUTPATIENT)
Dept: OCCUPATIONAL THERAPY | Facility: CLINIC | Age: 67
DRG: 603 | End: 2017-11-04
Payer: MEDICARE

## 2017-11-04 LAB
ANION GAP SERPL CALCULATED.3IONS-SCNC: 8 MMOL/L (ref 3–14)
ANION GAP SERPL CALCULATED.3IONS-SCNC: 8 MMOL/L (ref 3–14)
BASOPHILS # BLD AUTO: 0 10E9/L (ref 0–0.2)
BASOPHILS NFR BLD AUTO: 0.3 %
BUN SERPL-MCNC: 34 MG/DL (ref 7–30)
BUN SERPL-MCNC: 34 MG/DL (ref 7–30)
CALCIUM SERPL-MCNC: 8 MG/DL (ref 8.5–10.1)
CALCIUM SERPL-MCNC: 8.1 MG/DL (ref 8.5–10.1)
CHLORIDE SERPL-SCNC: 113 MMOL/L (ref 94–109)
CHLORIDE SERPL-SCNC: 113 MMOL/L (ref 94–109)
CO2 SERPL-SCNC: 21 MMOL/L (ref 20–32)
CO2 SERPL-SCNC: 22 MMOL/L (ref 20–32)
CREAT SERPL-MCNC: 3.19 MG/DL (ref 0.52–1.04)
CREAT SERPL-MCNC: 3.25 MG/DL (ref 0.52–1.04)
CRP SERPL-MCNC: 96 MG/L (ref 0–8)
DIFFERENTIAL METHOD BLD: ABNORMAL
EOSINOPHIL # BLD AUTO: 0.2 10E9/L (ref 0–0.7)
EOSINOPHIL NFR BLD AUTO: 2 %
ERYTHROCYTE [DISTWIDTH] IN BLOOD BY AUTOMATED COUNT: 14.3 % (ref 10–15)
GFR SERPL CREATININE-BSD FRML MDRD: 14 ML/MIN/1.7M2
GFR SERPL CREATININE-BSD FRML MDRD: 14 ML/MIN/1.7M2
GLUCOSE BLDC GLUCOMTR-MCNC: 127 MG/DL (ref 70–99)
GLUCOSE BLDC GLUCOMTR-MCNC: 137 MG/DL (ref 70–99)
GLUCOSE BLDC GLUCOMTR-MCNC: 67 MG/DL (ref 70–99)
GLUCOSE BLDC GLUCOMTR-MCNC: 77 MG/DL (ref 70–99)
GLUCOSE SERPL-MCNC: 109 MG/DL (ref 70–99)
GLUCOSE SERPL-MCNC: 129 MG/DL (ref 70–99)
HCT VFR BLD AUTO: 24.6 % (ref 35–47)
HGB BLD-MCNC: 7.6 G/DL (ref 11.7–15.7)
IMM GRANULOCYTES # BLD: 0 10E9/L (ref 0–0.4)
IMM GRANULOCYTES NFR BLD: 0.5 %
LYMPHOCYTES # BLD AUTO: 1.5 10E9/L (ref 0.8–5.3)
LYMPHOCYTES NFR BLD AUTO: 19.8 %
MAGNESIUM SERPL-MCNC: 1.9 MG/DL (ref 1.6–2.3)
MAGNESIUM SERPL-MCNC: 2.1 MG/DL (ref 1.6–2.3)
MCH RBC QN AUTO: 27.1 PG (ref 26.5–33)
MCHC RBC AUTO-ENTMCNC: 30.9 G/DL (ref 31.5–36.5)
MCV RBC AUTO: 88 FL (ref 78–100)
MONOCYTES # BLD AUTO: 0.4 10E9/L (ref 0–1.3)
MONOCYTES NFR BLD AUTO: 5.2 %
NEUTROPHILS # BLD AUTO: 5.4 10E9/L (ref 1.6–8.3)
NEUTROPHILS NFR BLD AUTO: 72.2 %
NRBC # BLD AUTO: 0 10*3/UL
NRBC BLD AUTO-RTO: 0 /100
PHOSPHATE SERPL-MCNC: 3.8 MG/DL (ref 2.5–4.5)
PHOSPHATE SERPL-MCNC: 4 MG/DL (ref 2.5–4.5)
PLATELET # BLD AUTO: 310 10E9/L (ref 150–450)
POTASSIUM SERPL-SCNC: 3.9 MMOL/L (ref 3.4–5.3)
POTASSIUM SERPL-SCNC: 4 MMOL/L (ref 3.4–5.3)
RBC # BLD AUTO: 2.8 10E12/L (ref 3.8–5.2)
SODIUM SERPL-SCNC: 142 MMOL/L (ref 133–144)
SODIUM SERPL-SCNC: 142 MMOL/L (ref 133–144)
WBC # BLD AUTO: 7.4 10E9/L (ref 4–11)

## 2017-11-04 PROCEDURE — 25000128 H RX IP 250 OP 636: Performed by: FAMILY MEDICINE

## 2017-11-04 PROCEDURE — 97535 SELF CARE MNGMENT TRAINING: CPT | Mod: GO

## 2017-11-04 PROCEDURE — 25000125 ZZHC RX 250: Performed by: FAMILY MEDICINE

## 2017-11-04 PROCEDURE — 97110 THERAPEUTIC EXERCISES: CPT | Mod: GO

## 2017-11-04 PROCEDURE — 36415 COLL VENOUS BLD VENIPUNCTURE: CPT | Performed by: FAMILY MEDICINE

## 2017-11-04 PROCEDURE — 25000132 ZZH RX MED GY IP 250 OP 250 PS 637: Mod: GY | Performed by: FAMILY MEDICINE

## 2017-11-04 PROCEDURE — 12000008 ZZH R&B INTERMEDIATE UMMC

## 2017-11-04 PROCEDURE — 86140 C-REACTIVE PROTEIN: CPT | Performed by: FAMILY MEDICINE

## 2017-11-04 PROCEDURE — 83735 ASSAY OF MAGNESIUM: CPT | Performed by: FAMILY MEDICINE

## 2017-11-04 PROCEDURE — 80048 BASIC METABOLIC PNL TOTAL CA: CPT | Performed by: FAMILY MEDICINE

## 2017-11-04 PROCEDURE — S0077 INJECTION, CLINDAMYCIN PHOSP: HCPCS | Performed by: FAMILY MEDICINE

## 2017-11-04 PROCEDURE — 97140 MANUAL THERAPY 1/> REGIONS: CPT | Mod: GO

## 2017-11-04 PROCEDURE — 84100 ASSAY OF PHOSPHORUS: CPT | Performed by: FAMILY MEDICINE

## 2017-11-04 PROCEDURE — 85025 COMPLETE CBC W/AUTO DIFF WBC: CPT | Performed by: FAMILY MEDICINE

## 2017-11-04 PROCEDURE — 00000146 ZZHCL STATISTIC GLUCOSE BY METER IP

## 2017-11-04 PROCEDURE — A9270 NON-COVERED ITEM OR SERVICE: HCPCS | Mod: GY | Performed by: FAMILY MEDICINE

## 2017-11-04 PROCEDURE — 40000133 ZZH STATISTIC OT WARD VISIT

## 2017-11-04 PROCEDURE — 40000556 ZZH STATISTIC PERIPHERAL IV START W US GUIDANCE

## 2017-11-04 RX ORDER — CLINDAMYCIN PHOSPHATE 600 MG/50ML
600 INJECTION, SOLUTION INTRAVENOUS EVERY 8 HOURS
Status: DISCONTINUED | OUTPATIENT
Start: 2017-11-04 | End: 2017-11-05

## 2017-11-04 RX ORDER — FUROSEMIDE 10 MG/ML
60 INJECTION INTRAMUSCULAR; INTRAVENOUS ONCE
Status: COMPLETED | OUTPATIENT
Start: 2017-11-04 | End: 2017-11-04

## 2017-11-04 RX ORDER — FUROSEMIDE 20 MG
80 TABLET ORAL 2 TIMES DAILY
Status: DISCONTINUED | OUTPATIENT
Start: 2017-11-05 | End: 2017-11-08

## 2017-11-04 RX ORDER — FUROSEMIDE 20 MG
80 TABLET ORAL 2 TIMES DAILY
Status: DISCONTINUED | OUTPATIENT
Start: 2017-11-05 | End: 2017-11-04

## 2017-11-04 RX ADMIN — FUROSEMIDE 60 MG: 10 INJECTION, SOLUTION INTRAVENOUS at 18:14

## 2017-11-04 RX ADMIN — INSULIN ASPART 6 UNITS: 100 INJECTION, SOLUTION INTRAVENOUS; SUBCUTANEOUS at 17:00

## 2017-11-04 RX ADMIN — FUROSEMIDE 60 MG: 10 INJECTION, SOLUTION INTRAVENOUS at 08:51

## 2017-11-04 RX ADMIN — PIPERACILLIN AND TAZOBACTAM 2.25 G: 2; .25 INJECTION, POWDER, LYOPHILIZED, FOR SOLUTION INTRAVENOUS; PARENTERAL at 00:19

## 2017-11-04 RX ADMIN — HEPARIN SODIUM 7500 UNITS: 10000 INJECTION, SOLUTION INTRAVENOUS; SUBCUTANEOUS at 14:31

## 2017-11-04 RX ADMIN — INSULIN ASPART 6 UNITS: 100 INJECTION, SOLUTION INTRAVENOUS; SUBCUTANEOUS at 07:44

## 2017-11-04 RX ADMIN — INSULIN GLARGINE 35 UNITS: 100 INJECTION, SOLUTION SUBCUTANEOUS at 22:11

## 2017-11-04 RX ADMIN — ATORVASTATIN CALCIUM 20 MG: 20 TABLET, FILM COATED ORAL at 08:51

## 2017-11-04 RX ADMIN — MICONAZOLE NITRATE: 2 POWDER TOPICAL at 08:50

## 2017-11-04 RX ADMIN — PIPERACILLIN AND TAZOBACTAM 2.25 G: 2; .25 INJECTION, POWDER, LYOPHILIZED, FOR SOLUTION INTRAVENOUS; PARENTERAL at 16:58

## 2017-11-04 RX ADMIN — PIPERACILLIN AND TAZOBACTAM 2.25 G: 2; .25 INJECTION, POWDER, LYOPHILIZED, FOR SOLUTION INTRAVENOUS; PARENTERAL at 08:52

## 2017-11-04 RX ADMIN — HEPARIN SODIUM 7500 UNITS: 10000 INJECTION, SOLUTION INTRAVENOUS; SUBCUTANEOUS at 06:31

## 2017-11-04 RX ADMIN — DULOXETINE HYDROCHLORIDE 30 MG: 30 CAPSULE, DELAYED RELEASE ORAL at 20:07

## 2017-11-04 RX ADMIN — CARVEDILOL 25 MG: 6.25 TABLET, FILM COATED ORAL at 08:51

## 2017-11-04 RX ADMIN — CLINDAMYCIN PHOSPHATE 600 MG: 12 INJECTION, SOLUTION INTRAVENOUS at 20:08

## 2017-11-04 RX ADMIN — ALBUTEROL SULFATE 2 PUFF: 90 AEROSOL, METERED RESPIRATORY (INHALATION) at 02:03

## 2017-11-04 RX ADMIN — HEPARIN SODIUM 7500 UNITS: 10000 INJECTION, SOLUTION INTRAVENOUS; SUBCUTANEOUS at 22:13

## 2017-11-04 RX ADMIN — Medication 1 CAPSULE: at 08:51

## 2017-11-04 RX ADMIN — ONDANSETRON 4 MG: 4 TABLET, ORALLY DISINTEGRATING ORAL at 03:59

## 2017-11-04 RX ADMIN — CARVEDILOL 25 MG: 6.25 TABLET, FILM COATED ORAL at 17:00

## 2017-11-04 RX ADMIN — CLINDAMYCIN PHOSPHATE 600 MG: 12 INJECTION, SOLUTION INTRAVENOUS at 13:24

## 2017-11-04 RX ADMIN — ACETAMINOPHEN 650 MG: 325 TABLET, FILM COATED ORAL at 03:59

## 2017-11-04 RX ADMIN — ACETAMINOPHEN 650 MG: 325 TABLET, FILM COATED ORAL at 16:57

## 2017-11-04 RX ADMIN — Medication 1 CAPSULE: at 20:07

## 2017-11-04 RX ADMIN — AMLODIPINE BESYLATE 10 MG: 10 TABLET ORAL at 08:50

## 2017-11-04 RX ADMIN — DULOXETINE HYDROCHLORIDE 30 MG: 30 CAPSULE, DELAYED RELEASE ORAL at 08:51

## 2017-11-04 ASSESSMENT — ENCOUNTER SYMPTOMS
ABDOMINAL PAIN: 0
VOMITING: 0
DYSURIA: 0
NAUSEA: 0
MYALGIAS: 1
FEVER: 0
DIARRHEA: 0
CHILLS: 0
LIGHT-HEADEDNESS: 0
CONSTIPATION: 0
HEADACHES: 0
SHORTNESS OF BREATH: 0
COUGH: 0

## 2017-11-04 ASSESSMENT — PAIN DESCRIPTION - DESCRIPTORS: DESCRIPTORS: ACHING;SORE

## 2017-11-04 NOTE — PROGRESS NOTES
[facility] - Inpatient daily progress note      Date of admission: 11/1/2017  Date of service: November 4, 2017       Assessment and Plan:   Assessment:   Supriya Herr is a 67 year old female with a significant past medical history of chronic kidney disease stage 3, congestive heart failure, uncontrolled diabetes type-2, HTN, HLD, chronic lymphedema, JONE and morbid obesity admitted for RLE cellulitis.  Patient Active Problem List   Diagnosis     Vitiligo     Traumatic amputation of leg above knee (H)     Contact dermatitis and other eczema, due to unspecified cause     Dermatophytosis of nail     Generalized osteoarthrosis, unspecified site     Hypertension goal BP (blood pressure) < 140/90     Mild nonproliferative diabetic retinopathy (H)     Proteinuria     Stage I pressure ulcer     Hyperlipidemia LDL goal <100     Non compliance with medical treatment     Tobacco abuse     Advanced directives, counseling/discussion     Incontinence of urine     Basal cell carcinoma     Senile nuclear sclerosis     JONE (obstructive sleep apnea)     CHF (congestive heart failure) (H)     Health Care Home     CKD (chronic kidney disease) stage 3, GFR 30-59 ml/min     Type 2 diabetes, HbA1C goal < 8% (H)     Type 2 diabetes mellitus with diabetic chronic kidney disease (H)     Morbid obesity (H)     Type 2 diabetes mellitus with stage 3 chronic kidney disease, without long-term current use of insulin (H)     Moderate recurrent major depression (H)     Type 2 diabetes mellitus with diabetic neuropathy, with long-term current use of insulin (H)     Cellulitis      Plan:    ## Right thigh Cellulitis: Worsening  ## Chronic lymphedema: Improved   Progressing erythema of the right thigh. Unclear sight of bacterial entry point. Despite progressing erythema she remains hemodynamically stable with improved inflammatory markers.   - Continue Zosyn and added Clindamycin 600 mg TID for MRSA coverage   - Discontinued  Vancomycin considering worsening renal function  - Lactobacillus for C-diff prophylaxis     - Lasix 60 mg IV once. Transition to PTA Lasix 80 mg PO tomorrow   - Monitor I/O and daily weights.   - Recheck PM potassium   - Discontinue amlodipine considering potential to cause edema.   - Continue to monitor vitals and labs closely   - Physical and occupational therapy for edema   - Encouraged leg elevated     ## Acute on Chronic kidney disease-stage-3  Acute on chronic kidney disease in the setting of uncontrolled DM-Type-2, decreased oral intake, infection, and increased lasix. Baseline creatinine 1.8-2.1 from earlier this year, but graphing 1/creatinine vs. Time shows progressive decline of renal function which has been progressing faster over last 12 months. Fractional excretion of urea 45 suggesting intrinsic renal etiology.  Patient appears euvolemic and no evidence of sepsis.   - Encouraged increased oral intake  - Continue to hold PTA lisinopril 40mg BID   - Resume tolterodine 2mg   - Avoid nephrotoxic meds when possible, renally dose medications  - Resume home lasix 80mg BID (or 40mg in PM if patient insists)  - strict I/O's and daily weights       ## Diabetes- type 2: Uncontrolled    Uncontrolled diabetes with last Hgb A1c 9.2 (9/13/17). She follows with endocrinology for her diabetes. Daughter reports non-adherence to current diabetic regimen and that she frequently forgets meal coverage.  When patient received 35 units lantus and 6 units aspart yesterday AM she became hypoglycemic to 50's, home dose may be too high for her and outpatient insulin regimen may have been increased despite non-adherence.  - Home regimen: Lantus 70u at bedtime, Novolog 6U with meals and sliding scale  - Hospital regimen: Lantus 35u at bedtime, Novolog 6U with meals and MSSI  - Hypoglycemia protocol  - Moderate consistent carbohydrate diet     ## HTN:   - Continue PTA carvedilol 25mg BID  - Discontinue amlodipine 10mg daily in the  setting of edema   - Hold lisinopril 40 mg BID   - Hydralazine 10mg PRN BP >180/110  - Continue PTA Lipitor 20 mg daily      ##HFpEF  Last echocardiogram 12/2013 with normal LV function but technically difficulty study due to patient's obesity.  HFpEF documented in chart, but no impaired diastolic filling on echo  - Continue carvedilol 25mg BID  - Hold lisinopril in setting of LORI  - consider repeat echocardiogram as outpatient     ## Normocytic Anemia- Stable  Hgb is stable at 9.4 with baseline ~9-10. She has a history of iron deficiency anemia.   - Consider iron studies and iron supplementation as outpatient      ## Neuropathy  - continue cymbalta 30mg BID     ##HLD  - Continue lipitor 20mg daily     ## JONE  - Refuses use of CPAP since she is unable to tolerate   - Continue to encourage CPAP use, but not ordered currently     ##Stage 1 pressure ulcer on coccyx   - WOC consulted     ## Physical deconditioning  - PT/OT consult     Fluids: PO  Electrolytes:WNL, will monitor  Nutrition: Moderate carb control diet  Lines:PIV  Activity: -Up as tolerated, PT, OT  CODE: Full Code  Prophylaxis: Heparin SQ  PCP communication:  - Noam Jackson  - Contacted at admission: No  - Concerns or updates: N/A  Dispo: Expected discharge date 1-2 days pending clinical improvement              Interval History:   Patient was seen and evaluated this morning. She reports right thigh pain. Her physical exam revealed worsening erythema. Vitals signs and nurses notes reviewed. Medical management including labs, vitals and care plan were discussed with patient. Patient would be update during rounds with attending physician with additional management and plan.             Review of Systems:   Review of Systems   Constitutional: Negative for chills and fever.   Eyes: Negative for visual disturbance.   Respiratory: Negative for cough and shortness of breath.    Cardiovascular: Negative for chest pain.   Gastrointestinal: Negative for  abdominal pain, constipation, diarrhea, nausea and vomiting.   Genitourinary: Negative for dysuria.   Musculoskeletal: Positive for myalgias.   Neurological: Negative for light-headedness and headaches.          Physical Exam (Resident / Clinician):     Vitals were reviewed  Patient Vitals for the past 24 hrs:   BP Temp Temp src Pulse Heart Rate Resp SpO2 Weight   11/04/17 1405 148/47 97.3  F (36.3  C) Oral - 76 18 94 % -   11/04/17 1200 - - - - - - - 105.2 kg (232 lb)   11/04/17 1140 149/56 98.9  F (37.2  C) Oral 71 - 18 93 % -   11/04/17 0759 165/84 98.5  F (36.9  C) Oral 81 - 18 95 % -   11/03/17 2202 158/48 97.7  F (36.5  C) Oral 73 - 18 93 % -   11/03/17 1509 142/50 98.8  F (37.1  C) Oral - 79 20 94 % -       Physical Exam   Constitutional: She is oriented to person, place, and time. She appears well-developed and well-nourished. No distress.   HENT:   Head: Normocephalic and atraumatic.   Eyes: Conjunctivae are normal.   Neck: Neck supple.   Cardiovascular: Normal rate and regular rhythm.    Pulmonary/Chest:   Basilar crackles    Abdominal: Soft. Bowel sounds are normal. There is tenderness ( ). There is no rebound and no guarding.   Musculoskeletal: She exhibits edema (2+ pitting edema right lower extremity).   Right thigh erythema and warmth noted. Erythema progressed passed marked area.    Neurological: She is alert and oriented to person, place, and time.   Skin: Skin is warm and dry. She is not diaphoretic.   Psychiatric: She has a normal mood and affect.     Intake/Output Summary (Last 24 hours) at 11/04/17 1520  Last data filed at 11/04/17 1405   Gross per 24 hour   Intake             1330 ml   Output             2375 ml   Net            -1045 ml           Data:   ROUTINE LABS (Last four results)  CMP    Recent Labs  Lab 11/04/17  0810 11/03/17  0601 11/02/17  0828 11/01/17  2135 11/01/17  1414    144 140  --  139   POTASSIUM 3.9 3.3* 3.8  --  3.6   CHLORIDE 113* 114* 109  --  106   CO2 21 22 25   --  24   ANIONGAP 8 8 6  --  9   * 96 92  --  245*   BUN 34* 42* 39*  --  38*   CR 3.25* 3.19* 2.92* 2.82* 2.86*   GFRESTIMATED 14* 14* 16* 17* 16*   GFRESTBLACK 17* 17* 19* 20* 20*   JODY 8.1* 7.5* 7.9*  --  8.4*   MAG 2.1 2.0  --   --   --    PHOS 3.8 4.2  --   --   --    PROTTOTAL  --   --   --   --  7.2   ALBUMIN  --   --   --   --  1.9*   BILITOTAL  --   --   --   --  0.3   ALKPHOS  --   --   --   --  79   AST  --   --   --   --  7   ALT  --   --   --   --  15     CBC    Recent Labs  Lab 11/04/17  0810 11/03/17  0601 11/02/17  0828 11/01/17  2135 11/01/17  1414   WBC 7.4 9.4 14.5*  --  18.0*   RBC 2.80* 2.57* 2.83*  --  3.32*   HGB 7.6* 7.0* 7.7*  --  9.4*   HCT 24.6* 22.6* 24.8*  --  28.7*   MCV 88 88 88  --  86   MCH 27.1 27.2 27.2  --  28.3   MCHC 30.9* 31.0* 31.0*  --  32.8   RDW 14.3 14.4 13.9  --  13.5    213 208 251 269     INRNo lab results found in last 7 days.  CRP    Recent Labs  Lab 11/04/17  0810 11/03/17  0601 11/02/17  0828 11/01/17  1414   CRP 96.0* 160.0* 220.0* 138.0*         Blood culture:  Invalid input(s): BC   Urine culture:  No results for input(s): URC in the last 168 hours.  All cultures:    Recent Labs  Lab 11/02/17  0600 11/01/17  1550 11/01/17  1414   CULT No growth No growth after 3 days No growth after 3 days           Medications:     Current Facility-Administered Medications   Medication     clindamycin (CLEOCIN) infusion 600 mg     piperacillin-tazobactam (ZOSYN) 2.25 g vial to attach to  ml bag     hydrALAZINE (APRESOLINE) injection 10 mg     loratadine (CLARITIN) tablet 10 mg     miconazole (MICATIN; MICRO GUARD) 2 % powder     lactobacillus rhamnosus (GG) (CULTURELL) capsule 1 capsule     heparin injection 7,500 Units     albuterol (PROAIR HFA/PROVENTIL HFA/VENTOLIN HFA) Inhaler 2 puff     amLODIPine (NORVASC) tablet 10 mg     atorvastatin (LIPITOR) tablet 20 mg     carvedilol (COREG) tablet 25 mg     DULoxetine (CYMBALTA) EC capsule 30 mg     insulin  aspart (NovoLOG) inj (RAPID ACTING)     naloxone (NARCAN) injection 0.1-0.4 mg     polyethylene glycol (MIRALAX/GLYCOLAX) Packet 17 g     ondansetron (ZOFRAN-ODT) ODT tab 4 mg    Or     ondansetron (ZOFRAN) injection 4 mg     glucose 40 % gel 15-30 g    Or     dextrose 50 % injection 25-50 mL    Or     glucagon injection 1 mg     insulin aspart (NovoLOG) inj (RAPID ACTING)     insulin aspart (NovoLOG) inj (RAPID ACTING)     insulin glargine (LANTUS) injection 35 Units     acetaminophen (TYLENOL) tablet 650 mg     oxyCODONE (ROXICODONE) IR tablet 5 mg       Caring Physician: Brendon Acuna MD  [facility] [place]  Pager Contact: see Physician sticky note

## 2017-11-04 NOTE — PLAN OF CARE
Problem: Patient Care Overview  Goal: Plan of Care/Patient Progress Review  Edema/7C: Pt with wraps donned and loosening. Measurements taken, pt with significant reductions in 7/7 measurements, continues to present with 1+ pitting in legs, non-pitting in foot. 2+ pitting in thighs. Skin cares performed prior to reapplication of GCB with sween 24 from MTPs to knee creases for continued edema management and protection of skin integrity. Okay to wear x 48 hours until therapist returns. Please remove compression if causing numbness, tingling, pain, or becomes soiled.     Recommend  lymphedema services to address continued needs with wrapping or sizing of garments for long term edema management.

## 2017-11-04 NOTE — PROGRESS NOTES
"/56 (BP Location: Right arm)  Pulse 71  Temp 98.9  F (37.2  C) (Oral)  Resp 18  Ht 1.67 m (5' 5.75\")  Wt 105.2 kg (232 lb)  SpO2 93%  BMI 37.73 kg/m2    A&Ox4. VSS on RA. Denies pain/nausea. Left BKA, cellulitis to R lateral thigh; area of erythema marked. Cellulitis appears to be diminishing but also migrating to the distal RLE. RLE has a lymphedema wrap in place, wrapped this morning. Patient using BSC with Ax1-2 stand and pivot, 2 watery to loose green stools with a moderate amount of urine output. No incontinent episodes. Pressure sore to gluteal cleft, dressing CDI. Pleasant and cooperative with care.   "

## 2017-11-04 NOTE — PLAN OF CARE
Problem: Patient Care Overview  Goal: Plan of Care/Patient Progress Review  Outcome: Improving  Pt A/O, VSS. Pt states leg has mild pain but declines any pain medication. R leg erythema marked, unchanged. 3 loose BMs this shift. C diff sample negative. Tolerating regular consistent carb diet. BGs with meals + SS and bedtime. No insulin given with meal tonight, BG 78. Assist of 1-2 to commode. Patient hopeful to DC Sunday. Patient has history of L BKA. Voiding spontaneously in adequate amounts. Cont POC.

## 2017-11-04 NOTE — PLAN OF CARE
Problem: Patient Care Overview  Goal: Plan of Care/Patient Progress Review  Discharge Planner OT   Patient plan for discharge: home with assist   Current status: Pt was educated on EC/WS techniques for fatigue management and pursed lip breathing to improve overall SOB, also completed BUE strengthening exercises.   Barriers to return to prior living situation: pts daughter is pts PCA and is available to assist whenever needed with mobility and ADLs, pt currently limited by increased fatigue and pain  Recommendations for discharge: Home with assist   Rationale for recommendations: To increase safety and independence with ADLs/IADLs and functional mobility       Entered by: Hyacinth Colon 11/04/2017 3:10 PM

## 2017-11-05 ENCOUNTER — APPOINTMENT (OUTPATIENT)
Dept: CT IMAGING | Facility: CLINIC | Age: 67
DRG: 603 | End: 2017-11-05
Payer: MEDICARE

## 2017-11-05 LAB
ABO + RH BLD: NORMAL
ABO + RH BLD: NORMAL
ANION GAP SERPL CALCULATED.3IONS-SCNC: 9 MMOL/L (ref 3–14)
BASOPHILS # BLD AUTO: 0 10E9/L (ref 0–0.2)
BASOPHILS NFR BLD AUTO: 0.5 %
BLD GP AB SCN SERPL QL: NORMAL
BLD PROD TYP BPU: NORMAL
BLD PROD TYP BPU: NORMAL
BLD UNIT ID BPU: 0
BLOOD BANK CMNT PATIENT-IMP: NORMAL
BLOOD PRODUCT CODE: NORMAL
BPU ID: NORMAL
BUN SERPL-MCNC: 33 MG/DL (ref 7–30)
CALCIUM SERPL-MCNC: 7.8 MG/DL (ref 8.5–10.1)
CHLORIDE SERPL-SCNC: 113 MMOL/L (ref 94–109)
CK SERPL-CCNC: 18 U/L (ref 30–225)
CO2 SERPL-SCNC: 21 MMOL/L (ref 20–32)
CREAT SERPL-MCNC: 3.3 MG/DL (ref 0.52–1.04)
CRP SERPL-MCNC: 50 MG/L (ref 0–8)
DIFFERENTIAL METHOD BLD: ABNORMAL
EOSINOPHIL # BLD AUTO: 0.2 10E9/L (ref 0–0.7)
EOSINOPHIL NFR BLD AUTO: 2.5 %
ERYTHROCYTE [DISTWIDTH] IN BLOOD BY AUTOMATED COUNT: 14.3 % (ref 10–15)
ERYTHROCYTE [SEDIMENTATION RATE] IN BLOOD BY WESTERGREN METHOD: >145 MM/H (ref 0–30)
FERRITIN SERPL-MCNC: 117 NG/ML (ref 8–252)
FOLATE SERPL-MCNC: 37 NG/ML
GFR SERPL CREATININE-BSD FRML MDRD: 14 ML/MIN/1.7M2
GLUCOSE BLDC GLUCOMTR-MCNC: 102 MG/DL (ref 70–99)
GLUCOSE BLDC GLUCOMTR-MCNC: 114 MG/DL (ref 70–99)
GLUCOSE BLDC GLUCOMTR-MCNC: 127 MG/DL (ref 70–99)
GLUCOSE BLDC GLUCOMTR-MCNC: 250 MG/DL (ref 70–99)
GLUCOSE BLDC GLUCOMTR-MCNC: 66 MG/DL (ref 70–99)
GLUCOSE BLDC GLUCOMTR-MCNC: 89 MG/DL (ref 70–99)
GLUCOSE BLDC GLUCOMTR-MCNC: 92 MG/DL (ref 70–99)
GLUCOSE SERPL-MCNC: 106 MG/DL (ref 70–99)
HCT VFR BLD AUTO: 23 % (ref 35–47)
HGB BLD-MCNC: 7.1 G/DL (ref 11.7–15.7)
HGB BLD-MCNC: 8.5 G/DL (ref 11.7–15.7)
IMM GRANULOCYTES # BLD: 0 10E9/L (ref 0–0.4)
IMM GRANULOCYTES NFR BLD: 0.3 %
IRON SATN MFR SERPL: 10 % (ref 15–46)
IRON SERPL-MCNC: 25 UG/DL (ref 35–180)
LYMPHOCYTES # BLD AUTO: 1.2 10E9/L (ref 0.8–5.3)
LYMPHOCYTES NFR BLD AUTO: 19.2 %
MAGNESIUM SERPL-MCNC: 2 MG/DL (ref 1.6–2.3)
MCH RBC QN AUTO: 27.6 PG (ref 26.5–33)
MCHC RBC AUTO-ENTMCNC: 30.9 G/DL (ref 31.5–36.5)
MCV RBC AUTO: 90 FL (ref 78–100)
MONOCYTES # BLD AUTO: 0.5 10E9/L (ref 0–1.3)
MONOCYTES NFR BLD AUTO: 7.5 %
NEUTROPHILS # BLD AUTO: 4.3 10E9/L (ref 1.6–8.3)
NEUTROPHILS NFR BLD AUTO: 70 %
NRBC # BLD AUTO: 0 10*3/UL
NRBC BLD AUTO-RTO: 0 /100
NUM BPU REQUESTED: 1
PLATELET # BLD AUTO: 280 10E9/L (ref 150–450)
POTASSIUM SERPL-SCNC: 3.7 MMOL/L (ref 3.4–5.3)
RBC # BLD AUTO: 2.57 10E12/L (ref 3.8–5.2)
RETICS # AUTO: 40 10E9/L (ref 25–95)
RETICS/RBC NFR AUTO: 1.5 % (ref 0.5–2)
SODIUM SERPL-SCNC: 143 MMOL/L (ref 133–144)
SPECIMEN EXP DATE BLD: NORMAL
TIBC SERPL-MCNC: 256 UG/DL (ref 240–430)
TRANSFUSION STATUS PATIENT QL: NORMAL
TRANSFUSION STATUS PATIENT QL: NORMAL
VIT B12 SERPL-MCNC: 919 PG/ML (ref 193–986)
WBC # BLD AUTO: 6.1 10E9/L (ref 4–11)

## 2017-11-05 PROCEDURE — 82746 ASSAY OF FOLIC ACID SERUM: CPT | Performed by: FAMILY MEDICINE

## 2017-11-05 PROCEDURE — 85045 AUTOMATED RETICULOCYTE COUNT: CPT | Performed by: FAMILY MEDICINE

## 2017-11-05 PROCEDURE — P9016 RBC LEUKOCYTES REDUCED: HCPCS | Performed by: FAMILY MEDICINE

## 2017-11-05 PROCEDURE — 85025 COMPLETE CBC W/AUTO DIFF WBC: CPT | Performed by: FAMILY MEDICINE

## 2017-11-05 PROCEDURE — 36415 COLL VENOUS BLD VENIPUNCTURE: CPT | Performed by: FAMILY MEDICINE

## 2017-11-05 PROCEDURE — 40000141 ZZH STATISTIC PERIPHERAL IV START W/O US GUIDANCE

## 2017-11-05 PROCEDURE — 82728 ASSAY OF FERRITIN: CPT | Performed by: FAMILY MEDICINE

## 2017-11-05 PROCEDURE — 25000128 H RX IP 250 OP 636: Performed by: FAMILY MEDICINE

## 2017-11-05 PROCEDURE — 25000132 ZZH RX MED GY IP 250 OP 250 PS 637: Mod: GY | Performed by: FAMILY MEDICINE

## 2017-11-05 PROCEDURE — 83735 ASSAY OF MAGNESIUM: CPT | Performed by: FAMILY MEDICINE

## 2017-11-05 PROCEDURE — S0077 INJECTION, CLINDAMYCIN PHOSP: HCPCS | Performed by: FAMILY MEDICINE

## 2017-11-05 PROCEDURE — 89190 NASAL SMEAR FOR EOSINOPHILS: CPT | Performed by: FAMILY MEDICINE

## 2017-11-05 PROCEDURE — 25000128 H RX IP 250 OP 636

## 2017-11-05 PROCEDURE — 82607 VITAMIN B-12: CPT | Performed by: FAMILY MEDICINE

## 2017-11-05 PROCEDURE — 86901 BLOOD TYPING SEROLOGIC RH(D): CPT | Performed by: FAMILY MEDICINE

## 2017-11-05 PROCEDURE — 86140 C-REACTIVE PROTEIN: CPT | Performed by: FAMILY MEDICINE

## 2017-11-05 PROCEDURE — 83550 IRON BINDING TEST: CPT | Performed by: FAMILY MEDICINE

## 2017-11-05 PROCEDURE — 80048 BASIC METABOLIC PNL TOTAL CA: CPT | Performed by: FAMILY MEDICINE

## 2017-11-05 PROCEDURE — 73700 CT LOWER EXTREMITY W/O DYE: CPT | Mod: RT

## 2017-11-05 PROCEDURE — 86850 RBC ANTIBODY SCREEN: CPT | Performed by: FAMILY MEDICINE

## 2017-11-05 PROCEDURE — 12000008 ZZH R&B INTERMEDIATE UMMC

## 2017-11-05 PROCEDURE — 85018 HEMOGLOBIN: CPT | Performed by: FAMILY MEDICINE

## 2017-11-05 PROCEDURE — 25000125 ZZHC RX 250: Performed by: FAMILY MEDICINE

## 2017-11-05 PROCEDURE — 82550 ASSAY OF CK (CPK): CPT | Performed by: FAMILY MEDICINE

## 2017-11-05 PROCEDURE — 83540 ASSAY OF IRON: CPT | Performed by: FAMILY MEDICINE

## 2017-11-05 PROCEDURE — 86923 COMPATIBILITY TEST ELECTRIC: CPT | Performed by: FAMILY MEDICINE

## 2017-11-05 PROCEDURE — 85652 RBC SED RATE AUTOMATED: CPT | Performed by: FAMILY MEDICINE

## 2017-11-05 PROCEDURE — 00000146 ZZHCL STATISTIC GLUCOSE BY METER IP

## 2017-11-05 PROCEDURE — A9270 NON-COVERED ITEM OR SERVICE: HCPCS | Mod: GY | Performed by: FAMILY MEDICINE

## 2017-11-05 PROCEDURE — 86900 BLOOD TYPING SEROLOGIC ABO: CPT | Performed by: FAMILY MEDICINE

## 2017-11-05 RX ORDER — SODIUM CHLORIDE 9 MG/ML
INJECTION, SOLUTION INTRAVENOUS
Status: COMPLETED
Start: 2017-11-05 | End: 2017-11-05

## 2017-11-05 RX ADMIN — Medication 1 CAPSULE: at 07:47

## 2017-11-05 RX ADMIN — ONDANSETRON 4 MG: 4 TABLET, ORALLY DISINTEGRATING ORAL at 20:30

## 2017-11-05 RX ADMIN — SODIUM CHLORIDE 500 ML: 9 INJECTION, SOLUTION INTRAVENOUS at 16:23

## 2017-11-05 RX ADMIN — CLINDAMYCIN PHOSPHATE 600 MG: 12 INJECTION, SOLUTION INTRAVENOUS at 12:11

## 2017-11-05 RX ADMIN — Medication 1 CAPSULE: at 20:03

## 2017-11-05 RX ADMIN — INSULIN ASPART 6 UNITS: 100 INJECTION, SOLUTION INTRAVENOUS; SUBCUTANEOUS at 17:49

## 2017-11-05 RX ADMIN — ACETAMINOPHEN 650 MG: 325 TABLET, FILM COATED ORAL at 07:46

## 2017-11-05 RX ADMIN — INSULIN GLARGINE 35 UNITS: 100 INJECTION, SOLUTION SUBCUTANEOUS at 21:32

## 2017-11-05 RX ADMIN — ALBUTEROL SULFATE 2 PUFF: 90 AEROSOL, METERED RESPIRATORY (INHALATION) at 14:36

## 2017-11-05 RX ADMIN — HEPARIN SODIUM 7500 UNITS: 10000 INJECTION, SOLUTION INTRAVENOUS; SUBCUTANEOUS at 06:35

## 2017-11-05 RX ADMIN — DULOXETINE HYDROCHLORIDE 30 MG: 30 CAPSULE, DELAYED RELEASE ORAL at 20:03

## 2017-11-05 RX ADMIN — CARVEDILOL 25 MG: 6.25 TABLET, FILM COATED ORAL at 07:47

## 2017-11-05 RX ADMIN — BENZOCAINE AND MENTHOL 1 LOZENGE: 15; 3.6 LOZENGE ORAL at 01:49

## 2017-11-05 RX ADMIN — CLINDAMYCIN PHOSPHATE 600 MG: 12 INJECTION, SOLUTION INTRAVENOUS at 04:14

## 2017-11-05 RX ADMIN — PIPERACILLIN AND TAZOBACTAM 2.25 G: 2; .25 INJECTION, POWDER, LYOPHILIZED, FOR SOLUTION INTRAVENOUS; PARENTERAL at 00:05

## 2017-11-05 RX ADMIN — FUROSEMIDE 80 MG: 20 TABLET ORAL at 17:48

## 2017-11-05 RX ADMIN — CARVEDILOL 25 MG: 6.25 TABLET, FILM COATED ORAL at 17:47

## 2017-11-05 RX ADMIN — PIPERACILLIN AND TAZOBACTAM 2.25 G: 2; .25 INJECTION, POWDER, LYOPHILIZED, FOR SOLUTION INTRAVENOUS; PARENTERAL at 07:47

## 2017-11-05 RX ADMIN — HEPARIN SODIUM 7500 UNITS: 10000 INJECTION, SOLUTION INTRAVENOUS; SUBCUTANEOUS at 16:20

## 2017-11-05 RX ADMIN — ATORVASTATIN CALCIUM 20 MG: 20 TABLET, FILM COATED ORAL at 07:47

## 2017-11-05 RX ADMIN — DULOXETINE HYDROCHLORIDE 30 MG: 30 CAPSULE, DELAYED RELEASE ORAL at 07:47

## 2017-11-05 RX ADMIN — VANCOMYCIN HYDROCHLORIDE 1500 MG: 10 INJECTION, POWDER, LYOPHILIZED, FOR SOLUTION INTRAVENOUS at 17:41

## 2017-11-05 RX ADMIN — PIPERACILLIN AND TAZOBACTAM 2.25 G: 2; .25 INJECTION, POWDER, LYOPHILIZED, FOR SOLUTION INTRAVENOUS; PARENTERAL at 17:23

## 2017-11-05 RX ADMIN — FUROSEMIDE 80 MG: 20 TABLET ORAL at 07:47

## 2017-11-05 ASSESSMENT — ENCOUNTER SYMPTOMS
MYALGIAS: 1
VOMITING: 0
LIGHT-HEADEDNESS: 0
CONSTIPATION: 0
ABDOMINAL PAIN: 0
SHORTNESS OF BREATH: 0
DYSURIA: 0
CHILLS: 0
BACK PAIN: 1
COUGH: 0
FEVER: 0
DIARRHEA: 0
HEADACHES: 0
WOUND: 1
NAUSEA: 0

## 2017-11-05 NOTE — PLAN OF CARE
Problem: Patient Care Overview  Goal: Plan of Care/Patient Progress Review  OT 7C: cancel, pt politely declining therapy 2/2 increased pain and fatigue, will reschedule.

## 2017-11-05 NOTE — PLAN OF CARE
VSS. Up with 1-2 assist and pivots to commode. Pt repositioning self in bed. New mepilex applied to coccyx. Voiding, had a loose BM. Tolerating diet. BG checks 129 and 137. RLE with edema and erythema. Lymphedema wrap in place. Getting IV Abx and received a dose of IV Lasix. Pt states she is not having much pain. Continue with POC.

## 2017-11-05 NOTE — PLAN OF CARE
Problem: Skin and Soft Tissue Infection (Adult)  Goal: Signs and Symptoms of Listed Potential Problems Will be Absent, Minimized or Managed (Skin and Soft Tissue Infection)  Signs and symptoms of listed potential problems will be absent, minimized or managed by discharge/transition of care (reference Skin and Soft Tissue Infection (Adult) CPG).   Outcome: No Change  Pt. is alert and oriented,has bilateral hand numbness and tingling due to diabetic neuropathy.Right leg red ,warm and edematous,painful to touch,right lower leg pt has lymphedema wrap on.pt has pressure ulcer on her left sacrum red skin, small open area , no drainage , new dressing applied. LS clear diminished BS+,pt had BM and voided one adequate amount.BG 66 and 92 ,no insulin given MD aware.pt is up with assist of one to bed commode because pt. has left AKA.pt is receiving zosyn and clindamycin for cellulitis treatment ,Hgb this morning 7.1 ,rounding doctor said will order one unit of blood. /58,HR79,temp 98.6,RR 16 O 2 sats on RA 94%.will continue to monitor.

## 2017-11-05 NOTE — PLAN OF CARE
"Problem: Patient Care Overview  Goal: Plan of Care/Patient Progress Review  /48 (BP Location: Left arm)  Pulse 75  Temp 97.7  F (36.5  C) (Oral)  Resp 16  Ht 1.67 m (5' 5.75\")  Wt 105.2 kg (232 lb)  SpO2 95%  BMI 37.73 kg/m2     VSS on RA, A/Ox4. Up with 1-2 assist and pivots to commode with minimal assistance. Repositioning self in bed. Mepilex applied to coccyx. Voiding adequately, no BM overnight bit had BM yesterday. Tolerating regular diet. BG checks ACHS.. RLE with edema and erythema. Lymphedema wrap in place. Getting IV Abx Zosyn and clindo. Pt stated she had a bad taste in her mouth, throat lozenges ordered and given along with essential oils which pt stated helped. Will cont to monitor, follow POC, and update team with any changes.      "

## 2017-11-05 NOTE — PROGRESS NOTES
[facility] - Inpatient daily progress note      Date of admission: 11/1/2017  Date of service: 11/5/2017       Assessment and Plan:   Assessment:   Supriya Herr is a 67 year old female with a significant past medical history of chronic kidney disease stage 3, congestive heart failure, uncontrolled diabetes type-2, HTN, HLD, chronic lymphedema, JONE and morbid obesity admitted for RLE cellulitis which is not readily improving on IV antibiotics  Patient Active Problem List   Diagnosis     Vitiligo     Traumatic amputation of leg above knee (H)     Contact dermatitis and other eczema, due to unspecified cause     Dermatophytosis of nail     Generalized osteoarthrosis, unspecified site     Hypertension goal BP (blood pressure) < 140/90     Mild nonproliferative diabetic retinopathy (H)     Proteinuria     Stage I pressure ulcer     Hyperlipidemia LDL goal <100     Non compliance with medical treatment     Tobacco abuse     Advanced directives, counseling/discussion     Incontinence of urine     Basal cell carcinoma     Senile nuclear sclerosis     JONE (obstructive sleep apnea)     CHF (congestive heart failure) (H)     Health Care Home     CKD (chronic kidney disease) stage 3, GFR 30-59 ml/min     Type 2 diabetes, HbA1C goal < 8% (H)     Type 2 diabetes mellitus with diabetic chronic kidney disease (H)     Morbid obesity (H)     Type 2 diabetes mellitus with stage 3 chronic kidney disease, without long-term current use of insulin (H)     Moderate recurrent major depression (H)     Type 2 diabetes mellitus with diabetic neuropathy, with long-term current use of insulin (H)     Cellulitis      Plan:    ## Right thigh Cellulitis: Worsening  ## Chronic lymphedema: Stable  Progressing erythema of the right thigh.  Discussed with ID, will plan to re-start vancomycin for MRSA coverage as she seemed to improve initially.  - Venous duplex RLE to R/O DVT for non-infectious cause of swelling/redness/pain  -  Repeat CT RLE  - Restart Vancomycin, renally dosed per pharmacy  - Continue Zosyn  - Discontinue clindamycin  - Elevate RLE, lymph edema wrap RLE below knee    ## CKD G5  Baseline creatinine 1.8-2.1 from earlier this year, but graphing 1/creatinine vs. Time shows progressive decline of renal function which has been progressing faster over last 12 months.  - Continue to hold PTA lisinopril 40mg BID   - Resume tolterodine 2mg   - Avoid nephrotoxic meds when possible, renally dose medications  - Resume home lasix 80mg BID  - strict I/O's and daily weights    - Renal US  - Urine eosinophils     ## Diabetes- type 2: Uncontrolled    Uncontrolled diabetes with last Hgb A1c 9.2 (9/13/17). She follows with endocrinology for her diabetes. Daughter reports non-adherence to current diabetic regimen and that she frequently forgets meal coverage.  Glucose very well controlled on 1/2 home lantus dose; home dose may be too high for her and outpatient insulin regimen may have been increased despite non-adherence.  - Home regimen: Lantus 70u at bedtime, Novolog 6U with meals and sliding scale  - Hospital regimen: Lantus 35u at bedtime, Novolog 6U with meals and MSSI  - Hypoglycemia protocol  - Moderate consistent carbohydrate diet     ## HTN:   - Continue PTA carvedilol 25mg BID  - Hold lisinopril 40 mg BID for now  - Hydralazine 10mg PRN BP >180/110  - Continue PTA Lipitor 20 mg daily      ##HFpEF  Last echocardiogram 12/2013 with normal LV function but technically difficulty study due to patient's obesity.  HFpEF documented in chart, but no impaired diastolic filling on echo  - Continue carvedilol 25mg BID  - Hold lisinopril   - consider repeat echocardiogram as outpatient     ## Normocytic Anemia- Stable  Hgb is stable at 9.4 with baseline ~9-10. She has a history of iron deficiency anemia.   - Consider iron studies and iron supplementation as outpatient      ## Neuropathy  - continue cymbalta 30mg BID     ##HLD  - Continue  lipitor 20mg daily     ## JONE  - Refuses use of CPAP since she is unable to tolerate   - Continue to encourage CPAP use, but not ordered currently     ##Stage 1 pressure ulcer on coccyx   - WOC consulted, mepilex dressing to coccyx     ## Physical deconditioning  - PT/OT consulted     Fluids: PO  Electrolytes:WNL, will monitor  Nutrition: Moderate carb control diet  Lines:PIV  Activity: -Up as tolerated, PT, OT  CODE: Full Code  Prophylaxis: Heparin SQ  PCP communication:  - Noam Jackson  - Contacted at admission: No  - Concerns or updates: N/A  Dispo: Expected discharge date 2-3 days pending clinical improvement              Interval History:   Patient complaining of similar pain in right thigh that is worse than 2-3 days ago.  Also having worsening swelling and redness.  No fevers or chills.            Review of Systems:   Review of Systems   Constitutional: Negative for chills and fever.   Eyes: Negative for visual disturbance.   Respiratory: Negative for cough and shortness of breath.    Cardiovascular: Positive for leg swelling. Negative for chest pain.   Gastrointestinal: Negative for abdominal pain, constipation, diarrhea, nausea and vomiting.   Genitourinary: Negative for dysuria.   Musculoskeletal: Positive for back pain and myalgias.   Skin: Positive for rash and wound.   Neurological: Negative for light-headedness and headaches.          Physical Exam (Resident / Clinician):     Vitals were reviewed  Patient Vitals for the past 24 hrs:   BP Temp Temp src Pulse Heart Rate Resp SpO2 Weight   11/05/17 0816 166/58 98.6  F (37  C) Oral - 79 16 94 % -   11/05/17 0600 166/61 96.3  F (35.7  C) Oral 78 - 16 95 % -   11/04/17 2200 134/48 97.7  F (36.5  C) Oral 75 75 16 95 % -   11/04/17 1656 153/49 - - 82 - - - -   11/04/17 1405 148/47 97.3  F (36.3  C) Oral - 76 18 94 % -   11/04/17 1200 - - - - - - - 105.2 kg (232 lb)   11/04/17 1140 149/56 98.9  F (37.2  C) Oral 71 - 18 93 % -       Physical Exam    Constitutional: She is oriented to person, place, and time. She appears well-developed and well-nourished. No distress.   HENT:   Head: Normocephalic and atraumatic.   Eyes: Conjunctivae are normal.   Neck: Neck supple.   Cardiovascular: Normal rate and regular rhythm.    Pulmonary/Chest: Effort normal and breath sounds normal. No respiratory distress.   Abdominal: Soft. Bowel sounds are normal. There is no tenderness. There is no rebound and no guarding.   Musculoskeletal: She exhibits edema (2+ pitting edema right lower extremity).   Right thigh erythema and warmth noted. Erythema progressed passed marked area inferiorly, slightly inside margin anteriorly.  Palpable edema along lateral right thigh, no fluctuance or expressible drainage.   Neurological: She is alert and oriented to person, place, and time.   Skin: Skin is warm and dry. She is not diaphoretic.   Psychiatric: She has a normal mood and affect.             Data:   ROUTINE LABS (Last four results)  CMP    Recent Labs  Lab 11/05/17  0903 11/04/17  1626 11/04/17  0810 11/03/17  0601  11/01/17  1414    142 142 144  < > 139   POTASSIUM 3.7 4.0 3.9 3.3*  < > 3.6   CHLORIDE 113* 113* 113* 114*  < > 106   CO2 21 22 21 22  < > 24   ANIONGAP 9 8 8 8  < > 9   * 129* 109* 96  < > 245*   BUN 33* 34* 34* 42*  < > 38*   CR 3.30* 3.19* 3.25* 3.19*  < > 2.86*   GFRESTIMATED 14* 14* 14* 14*  < > 16*   GFRESTBLACK 17* 17* 17* 17*  < > 20*   JODY 7.8* 8.0* 8.1* 7.5*  < > 8.4*   MAG  --  1.9 2.1 2.0  --   --    PHOS  --  4.0 3.8 4.2  --   --    PROTTOTAL  --   --   --   --   --  7.2   ALBUMIN  --   --   --   --   --  1.9*   BILITOTAL  --   --   --   --   --  0.3   ALKPHOS  --   --   --   --   --  79   AST  --   --   --   --   --  7   ALT  --   --   --   --   --  15   < > = values in this interval not displayed.  CBC    Recent Labs  Lab 11/05/17  0903 11/04/17  0810 11/03/17  0601 11/02/17  0828   WBC 6.1 7.4 9.4 14.5*   RBC 2.57* 2.80* 2.57* 2.83*   HGB 7.1*  7.6* 7.0* 7.7*   HCT 23.0* 24.6* 22.6* 24.8*   MCV 90 88 88 88   MCH 27.6 27.1 27.2 27.2   MCHC 30.9* 30.9* 31.0* 31.0*   RDW 14.3 14.3 14.4 13.9    310 213 208     INRNo lab results found in last 7 days.  CRP    Recent Labs  Lab 11/05/17  0903 11/04/17  0810 11/03/17  0601 11/02/17  0828   CRP 50.0* 96.0* 160.0* 220.0*         Blood culture:  Invalid input(s): BC   Urine culture:  No results for input(s): URC in the last 168 hours.  All cultures:    Recent Labs  Lab 11/02/17  0600 11/01/17  1550 11/01/17  1414   CULT No growth No growth after 4 days No growth after 4 days           Medications:     Current Facility-Administered Medications   Medication     clindamycin (CLEOCIN) infusion 600 mg     furosemide (LASIX) tablet 80 mg     benzocaine-menthol (CEPACOL) 15-3.6 MG lozenge 1 lozenge     piperacillin-tazobactam (ZOSYN) 2.25 g vial to attach to  ml bag     hydrALAZINE (APRESOLINE) injection 10 mg     loratadine (CLARITIN) tablet 10 mg     miconazole (MICATIN; MICRO GUARD) 2 % powder     lactobacillus rhamnosus (GG) (CULTURELL) capsule 1 capsule     heparin injection 7,500 Units     albuterol (PROAIR HFA/PROVENTIL HFA/VENTOLIN HFA) Inhaler 2 puff     atorvastatin (LIPITOR) tablet 20 mg     carvedilol (COREG) tablet 25 mg     DULoxetine (CYMBALTA) EC capsule 30 mg     insulin aspart (NovoLOG) inj (RAPID ACTING)     naloxone (NARCAN) injection 0.1-0.4 mg     polyethylene glycol (MIRALAX/GLYCOLAX) Packet 17 g     ondansetron (ZOFRAN-ODT) ODT tab 4 mg    Or     ondansetron (ZOFRAN) injection 4 mg     glucose 40 % gel 15-30 g    Or     dextrose 50 % injection 25-50 mL    Or     glucagon injection 1 mg     insulin aspart (NovoLOG) inj (RAPID ACTING)     insulin aspart (NovoLOG) inj (RAPID ACTING)     insulin glargine (LANTUS) injection 35 Units     acetaminophen (TYLENOL) tablet 650 mg       Caring Physician: Brayden Carrasco MD  [facility]  Pager Contact: see Physician sticky note

## 2017-11-05 NOTE — DOWNTIME EVENT NOTE
The EMR was down for 1.75 hours on 11/5/2017.    Megan E. Dressler  was responsible for completing the paper charting during this time period.     The following information was re-entered into the system by Megan E. Dressler: none    The following information will remain in the paper chart: none    Megan E. Dressler  11/5/2017

## 2017-11-05 NOTE — PHARMACY-VANCOMYCIN DOSING SERVICE
Pharmacy Vancomycin Initial Note  Date of Service 2017  Patient's  1950  67 year old, female    Indication: Skin and Soft Tissue Infection    Current estimated CrCl = Estimated Creatinine Clearance: 19.9 mL/min (based on Cr of 3.3).    Creatinine for last 3 days  11/3/2017:  6:01 AM Creatinine 3.19 mg/dL  2017:  8:10 AM Creatinine 3.25 mg/dL;  4:26 PM Creatinine 3.19 mg/dL  2017:  9:03 AM Creatinine 3.30 mg/dL    Recent Vancomycin Level(s) for last 3 days  11/3/2017:  6:01 AM Vancomycin Level 9.6 mg/L      Vancomycin IV Administrations (past 72 hours)                   vancomycin (VANCOCIN) 1,250 mg in NaCl 0.9 % 250 mL intermittent infusion (mg) 1,250 mg New Bag 17 1356                Nephrotoxins and other renal medications (Future)    Start     Dose/Rate Route Frequency Ordered Stop    17 1530  vancomycin (VANCOCIN) 1,500 mg in NaCl 0.9 % 250 mL intermittent infusion      1,500 mg  over 90 Minutes Intravenous EVERY 48 HOURS 17 1520      17 0800  furosemide (LASIX) tablet 80 mg      80 mg Oral 2 TIMES DAILY 17 1840      17 1626  piperacillin-tazobactam (ZOSYN) 2.25 g vial to attach to  ml bag      2.25 g  over 30 Minutes Intravenous EVERY 8 HOURS 17 0955            Contrast Orders - past 72 hours     None                Plan:  1.  Start vancomycin  1500 mg IV q48h.   2.  Goal Trough Level: 10-15 mg/L   3.  Pharmacy will check trough levels as appropriate in 5-7 Days.    4. Serum creatinine levels will be ordered daily for the first week of therapy and at least twice weekly for subsequent weeks.    5. [place] method utilized to dose vancomycin therapy: Method 2    Danny Cooley, PharmD, MS  PGY2 Oncology/Hematology Pharmacy Resident

## 2017-11-06 ENCOUNTER — APPOINTMENT (OUTPATIENT)
Dept: ULTRASOUND IMAGING | Facility: CLINIC | Age: 67
DRG: 603 | End: 2017-11-06
Payer: MEDICARE

## 2017-11-06 ENCOUNTER — APPOINTMENT (OUTPATIENT)
Dept: OCCUPATIONAL THERAPY | Facility: CLINIC | Age: 67
DRG: 603 | End: 2017-11-06
Payer: MEDICARE

## 2017-11-06 LAB
ANION GAP SERPL CALCULATED.3IONS-SCNC: 7 MMOL/L (ref 3–14)
ANION GAP SERPL CALCULATED.3IONS-SCNC: 9 MMOL/L (ref 3–14)
BASOPHILS # BLD AUTO: 0 10E9/L (ref 0–0.2)
BASOPHILS NFR BLD AUTO: 0.2 %
BUN SERPL-MCNC: 31 MG/DL (ref 7–30)
BUN SERPL-MCNC: 33 MG/DL (ref 7–30)
CALCIUM SERPL-MCNC: 8.2 MG/DL (ref 8.5–10.1)
CALCIUM SERPL-MCNC: 8.6 MG/DL (ref 8.5–10.1)
CHLORIDE SERPL-SCNC: 113 MMOL/L (ref 94–109)
CHLORIDE SERPL-SCNC: 114 MMOL/L (ref 94–109)
CO2 SERPL-SCNC: 20 MMOL/L (ref 20–32)
CO2 SERPL-SCNC: 24 MMOL/L (ref 20–32)
CREAT SERPL-MCNC: 3.14 MG/DL (ref 0.52–1.04)
CREAT SERPL-MCNC: 3.14 MG/DL (ref 0.52–1.04)
CRP SERPL-MCNC: 31 MG/L (ref 0–8)
DIFFERENTIAL METHOD BLD: ABNORMAL
EOSINOPHIL # BLD AUTO: 0.2 10E9/L (ref 0–0.7)
EOSINOPHIL NFR BLD AUTO: 2.5 %
EOSINOPHIL SPEC QL WRIGHT STN: NORMAL
ERYTHROCYTE [DISTWIDTH] IN BLOOD BY AUTOMATED COUNT: 14.5 % (ref 10–15)
ERYTHROCYTE [SEDIMENTATION RATE] IN BLOOD BY WESTERGREN METHOD: >140 MM/H (ref 0–30)
GFR SERPL CREATININE-BSD FRML MDRD: 15 ML/MIN/1.7M2
GFR SERPL CREATININE-BSD FRML MDRD: 15 ML/MIN/1.7M2
GLUCOSE BLDC GLUCOMTR-MCNC: 125 MG/DL (ref 70–99)
GLUCOSE BLDC GLUCOMTR-MCNC: 150 MG/DL (ref 70–99)
GLUCOSE BLDC GLUCOMTR-MCNC: 153 MG/DL (ref 70–99)
GLUCOSE BLDC GLUCOMTR-MCNC: 74 MG/DL (ref 70–99)
GLUCOSE BLDC GLUCOMTR-MCNC: 75 MG/DL (ref 70–99)
GLUCOSE BLDC GLUCOMTR-MCNC: 90 MG/DL (ref 70–99)
GLUCOSE SERPL-MCNC: 171 MG/DL (ref 70–99)
GLUCOSE SERPL-MCNC: 72 MG/DL (ref 70–99)
HCT VFR BLD AUTO: 27.5 % (ref 35–47)
HGB BLD-MCNC: 8.7 G/DL (ref 11.7–15.7)
IMM GRANULOCYTES # BLD: 0 10E9/L (ref 0–0.4)
IMM GRANULOCYTES NFR BLD: 0.2 %
LYMPHOCYTES # BLD AUTO: 1.7 10E9/L (ref 0.8–5.3)
LYMPHOCYTES NFR BLD AUTO: 21.4 %
MAGNESIUM SERPL-MCNC: 2 MG/DL (ref 1.6–2.3)
MCH RBC QN AUTO: 27.7 PG (ref 26.5–33)
MCHC RBC AUTO-ENTMCNC: 31.6 G/DL (ref 31.5–36.5)
MCV RBC AUTO: 88 FL (ref 78–100)
MONOCYTES # BLD AUTO: 0.5 10E9/L (ref 0–1.3)
MONOCYTES NFR BLD AUTO: 6.2 %
NEUTROPHILS # BLD AUTO: 5.6 10E9/L (ref 1.6–8.3)
NEUTROPHILS NFR BLD AUTO: 69.5 %
NRBC # BLD AUTO: 0 10*3/UL
NRBC BLD AUTO-RTO: 0 /100
PHOSPHATE SERPL-MCNC: 4.7 MG/DL (ref 2.5–4.5)
PLATELET # BLD AUTO: 333 10E9/L (ref 150–450)
POTASSIUM SERPL-SCNC: 3.7 MMOL/L (ref 3.4–5.3)
POTASSIUM SERPL-SCNC: 4 MMOL/L (ref 3.4–5.3)
RBC # BLD AUTO: 3.14 10E12/L (ref 3.8–5.2)
SODIUM SERPL-SCNC: 143 MMOL/L (ref 133–144)
SODIUM SERPL-SCNC: 144 MMOL/L (ref 133–144)
SPECIMEN SOURCE: NORMAL
WBC # BLD AUTO: 8.1 10E9/L (ref 4–11)

## 2017-11-06 PROCEDURE — 40000133 ZZH STATISTIC OT WARD VISIT

## 2017-11-06 PROCEDURE — 25000132 ZZH RX MED GY IP 250 OP 250 PS 637: Mod: GY | Performed by: FAMILY MEDICINE

## 2017-11-06 PROCEDURE — 80048 BASIC METABOLIC PNL TOTAL CA: CPT | Performed by: FAMILY MEDICINE

## 2017-11-06 PROCEDURE — 85652 RBC SED RATE AUTOMATED: CPT | Performed by: FAMILY MEDICINE

## 2017-11-06 PROCEDURE — A9270 NON-COVERED ITEM OR SERVICE: HCPCS | Mod: GY | Performed by: FAMILY MEDICINE

## 2017-11-06 PROCEDURE — 25000128 H RX IP 250 OP 636: Performed by: FAMILY MEDICINE

## 2017-11-06 PROCEDURE — 84100 ASSAY OF PHOSPHORUS: CPT | Performed by: FAMILY MEDICINE

## 2017-11-06 PROCEDURE — 00000146 ZZHCL STATISTIC GLUCOSE BY METER IP

## 2017-11-06 PROCEDURE — 76882 US LMTD JT/FCL EVL NVASC XTR: CPT | Mod: RT

## 2017-11-06 PROCEDURE — 76770 US EXAM ABDO BACK WALL COMP: CPT

## 2017-11-06 PROCEDURE — 25000125 ZZHC RX 250: Performed by: FAMILY MEDICINE

## 2017-11-06 PROCEDURE — 93971 EXTREMITY STUDY: CPT | Mod: RT

## 2017-11-06 PROCEDURE — 85025 COMPLETE CBC W/AUTO DIFF WBC: CPT | Performed by: FAMILY MEDICINE

## 2017-11-06 PROCEDURE — 12000008 ZZH R&B INTERMEDIATE UMMC

## 2017-11-06 PROCEDURE — 97110 THERAPEUTIC EXERCISES: CPT | Mod: GO

## 2017-11-06 PROCEDURE — 83735 ASSAY OF MAGNESIUM: CPT | Performed by: FAMILY MEDICINE

## 2017-11-06 PROCEDURE — 36415 COLL VENOUS BLD VENIPUNCTURE: CPT | Performed by: FAMILY MEDICINE

## 2017-11-06 PROCEDURE — 97530 THERAPEUTIC ACTIVITIES: CPT | Mod: GO

## 2017-11-06 PROCEDURE — 86140 C-REACTIVE PROTEIN: CPT | Performed by: FAMILY MEDICINE

## 2017-11-06 RX ORDER — HYDRALAZINE HYDROCHLORIDE 25 MG/1
25 TABLET, FILM COATED ORAL EVERY 8 HOURS
Status: DISCONTINUED | OUTPATIENT
Start: 2017-11-06 | End: 2017-11-08

## 2017-11-06 RX ORDER — AMLODIPINE BESYLATE 10 MG/1
10 TABLET ORAL DAILY
Status: DISCONTINUED | OUTPATIENT
Start: 2017-11-06 | End: 2017-11-13 | Stop reason: HOSPADM

## 2017-11-06 RX ADMIN — FUROSEMIDE 80 MG: 20 TABLET ORAL at 18:06

## 2017-11-06 RX ADMIN — HEPARIN SODIUM 7500 UNITS: 10000 INJECTION, SOLUTION INTRAVENOUS; SUBCUTANEOUS at 08:23

## 2017-11-06 RX ADMIN — HEPARIN SODIUM 7500 UNITS: 10000 INJECTION, SOLUTION INTRAVENOUS; SUBCUTANEOUS at 01:23

## 2017-11-06 RX ADMIN — DULOXETINE HYDROCHLORIDE 30 MG: 30 CAPSULE, DELAYED RELEASE ORAL at 08:17

## 2017-11-06 RX ADMIN — CARVEDILOL 25 MG: 6.25 TABLET, FILM COATED ORAL at 18:06

## 2017-11-06 RX ADMIN — ONDANSETRON 4 MG: 4 TABLET, ORALLY DISINTEGRATING ORAL at 20:45

## 2017-11-06 RX ADMIN — FUROSEMIDE 80 MG: 20 TABLET ORAL at 08:17

## 2017-11-06 RX ADMIN — ATORVASTATIN CALCIUM 20 MG: 20 TABLET, FILM COATED ORAL at 08:17

## 2017-11-06 RX ADMIN — PIPERACILLIN AND TAZOBACTAM 2.25 G: 2; .25 INJECTION, POWDER, LYOPHILIZED, FOR SOLUTION INTRAVENOUS; PARENTERAL at 18:16

## 2017-11-06 RX ADMIN — Medication 1 CAPSULE: at 20:39

## 2017-11-06 RX ADMIN — ACETAMINOPHEN 650 MG: 325 TABLET, FILM COATED ORAL at 22:58

## 2017-11-06 RX ADMIN — AMLODIPINE BESYLATE 10 MG: 10 TABLET ORAL at 13:04

## 2017-11-06 RX ADMIN — PIPERACILLIN AND TAZOBACTAM 2.25 G: 2; .25 INJECTION, POWDER, LYOPHILIZED, FOR SOLUTION INTRAVENOUS; PARENTERAL at 10:08

## 2017-11-06 RX ADMIN — Medication 1 CAPSULE: at 08:17

## 2017-11-06 RX ADMIN — DULOXETINE HYDROCHLORIDE 30 MG: 30 CAPSULE, DELAYED RELEASE ORAL at 20:39

## 2017-11-06 RX ADMIN — HYDRALAZINE HYDROCHLORIDE 25 MG: 25 TABLET ORAL at 18:05

## 2017-11-06 RX ADMIN — HEPARIN SODIUM 7500 UNITS: 10000 INJECTION, SOLUTION INTRAVENOUS; SUBCUTANEOUS at 18:05

## 2017-11-06 RX ADMIN — PIPERACILLIN AND TAZOBACTAM 2.25 G: 2; .25 INJECTION, POWDER, LYOPHILIZED, FOR SOLUTION INTRAVENOUS; PARENTERAL at 01:22

## 2017-11-06 RX ADMIN — CARVEDILOL 25 MG: 6.25 TABLET, FILM COATED ORAL at 08:17

## 2017-11-06 RX ADMIN — ACETAMINOPHEN 650 MG: 325 TABLET, FILM COATED ORAL at 01:22

## 2017-11-06 ASSESSMENT — ENCOUNTER SYMPTOMS
FEVER: 0
CHILLS: 0
BACK PAIN: 1
DYSURIA: 0
WOUND: 1
ABDOMINAL PAIN: 0
DIARRHEA: 0
CONSTIPATION: 0
VOMITING: 0
LIGHT-HEADEDNESS: 0
COUGH: 0
NAUSEA: 0
HEADACHES: 0
MYALGIAS: 1
SHORTNESS OF BREATH: 0
NERVOUS/ANXIOUS: 1

## 2017-11-06 ASSESSMENT — PAIN DESCRIPTION - DESCRIPTORS
DESCRIPTORS: THROBBING;TENDER
DESCRIPTORS: TENDER

## 2017-11-06 NOTE — PLAN OF CARE
Problem: Patient Care Overview  Goal: Plan of Care/Patient Progress Review  Edema/7C: Pt with wraps doffed, results of US for DVT negative. Pt's redness on lateral aspects of thigh extending to below knee crease, warmth to touch, drawn border intact. Pt presenting with non-pitting in feet and ankles, 1+ in calves and 2+ proximal to knee and thighs. Skin cares performed prior to reapplication of GCB with sween 24 from MTPs to knee creases for continued edema management and protection of skin integrity. Appropriate to continue use of compression to decrease risk of infection and to facilitate fluid mobilization. Okay to wear x 24 hours until therapist returns. Please remove compression if causing numbness, tingling, pain, or becomes soiled.

## 2017-11-06 NOTE — PLAN OF CARE
BP has been elevated to 160s systolic today, other vitals stable. Pt refusing scheduled tylenol. Ate 50% of meal. BG checks 250 and 114, Lantus given at HS. Pt c/o nausea before bed, prn zofran and aromatherapy given. Voiding and having BMs. Need to collect urine sample, pt aware. Pivots to commode with 1-2 assist. Blood transfusion completed without reaction, hgb recheck 8.5. Right leg CT done - pt's daughter wanted to know CT results, cross-cover MD paged to call daughter with results. Renal and RLE ultrasound have not been done yet d/t higher priority patients, estimated time of ultrasound will be around midnight. General Surgery consult placed by MD. Mepilex to pressure wound on coccyx. Right thigh warm, edematous - erythema still progressed past marked border to back of leg. Getting IV Vanco/Zosyn. Pt requested to have lymphedema wrap removed for the night, leg elevated on pillow. Receiving oral lasix BID. Continue with POC.

## 2017-11-06 NOTE — PROVIDER NOTIFICATION
ALISON paged Jessi Cross at [phone] regarding new erythema and warmth that extend past previous cellulitis markings. New area marked. US completed around 0130. Will continue to monitor per POC.

## 2017-11-06 NOTE — PROGRESS NOTES
[facility] daily progress [facility]      Date of admission: 11/1/2017  Date of service: 11/6/2017       Assessment and Plan:   Assessment:   Supriya Herr is a 67 year old female with a significant past medical history of chronic kidney disease stage 3, congestive heart failure, uncontrolled diabetes type-2, HTN, HLD, chronic lymphedema, JONE and morbid obesity admitted for RLE cellulitis which is worsening clinically on IV antibiotics despite normalized labs  Patient Active Problem List   Diagnosis     Vitiligo     Traumatic amputation of leg above knee (H)     Contact dermatitis and other eczema, due to unspecified cause     Dermatophytosis of nail     Generalized osteoarthrosis, unspecified site     Hypertension goal BP (blood pressure) < 140/90     Mild nonproliferative diabetic retinopathy (H)     Proteinuria     Stage I pressure ulcer     Hyperlipidemia LDL goal <100     Non compliance with medical treatment     Tobacco abuse     Advanced directives, counseling/discussion     Incontinence of urine     Basal cell carcinoma     Senile nuclear sclerosis     JONE (obstructive sleep apnea)     CHF (congestive heart failure) (H)     Health Care Home     CKD (chronic kidney disease) stage 3, GFR 30-59 ml/min     Type 2 diabetes, HbA1C goal < 8% (H)     Type 2 diabetes mellitus with diabetic chronic kidney disease (H)     Morbid obesity (H)     Type 2 diabetes mellitus with stage 3 chronic kidney disease, without long-term current use of insulin (H)     Moderate recurrent major depression (H)     Type 2 diabetes mellitus with diabetic neuropathy, with long-term current use of insulin (H)     Cellulitis      Plan:    ## Right thigh Cellulitis: Worsening  ## Chronic lymphedema: Stable  Progressing erythema of the right thigh.  CT showed worsening fluid and swelling deep and superficial right thigh.  No concern for necrotizing fasciitis, but certainly could be non-necrotizing fasciitis.   Surgery on board.  No fevers and improving per labs/vitasl/blood glucose.  No evidence of DVT on US. No evidence of abscess on CT or US.  CPK normal.  - ID consulted, continue vancomycin and zosyn  - Elevate RLE  - Serial labs    ## CKD G5  Baseline creatinine 1.8-2.1 from earlier this year, but graphing 1/creatinine vs. Time shows progressive decline of renal function which has been progressing faster over last 12 months.  - Continue to hold PTA lisinopril 40mg BID   - Resume tolterodine 2mg   - Avoid nephrotoxic meds when possible, renally dose medications  - Resume home lasix 80mg BID  - strict I/O's and daily weights    - Renal US showed medical renal disease  - Urine eosinophils     ## Diabetes- type 2: Uncontrolled    Uncontrolled diabetes with last Hgb A1c 9.2 (9/13/17). She follows with endocrinology for her diabetes. Daughter reports non-adherence to current diabetic regimen and that she frequently forgets meal coverage.  Glucose very well controlled on 1/2 home lantus dose; home dose may be too high for her and outpatient insulin regimen may have been increased despite non-adherence.  - Home regimen: Lantus 70u at bedtime, Novolog 6U with meals and sliding scale  - Hospital regimen: Lantus 33u at bedtime and MSSI  - Hypoglycemia protocol  - Moderate consistent carbohydrate diet     ## HTN:   - Continue PTA carvedilol 25mg BID  - Hold lisinopril 40 mg BID for now  - Start hydralazine 25mg PO q8h  - Continue PTA Lipitor 20 mg daily      ##HFpEF  Last echocardiogram 12/2013 with normal LV function but technically difficulty study due to patient's obesity.  HFpEF documented in chart, but no impaired diastolic filling on echo  - Continue carvedilol 25mg BID  - Hold lisinopril for renal function  - consider repeat echocardiogram as outpatient     ## Normocytic Anemia- Stable  Hgb is stable at 9.4 with baseline ~9-10. She has a history of iron deficiency anemia.   - Consider iron studies and iron supplementation  as outpatient      ## Neuropathy  - continue cymbalta 30mg BID     ##HLD  - Continue lipitor 20mg daily     ## JONE  - Refuses use of CPAP since she is unable to tolerate   - Continue to encourage CPAP use, but not ordered currently     ##Stage 1 pressure ulcer on coccyx   - WOC consulted, mepilex dressing to coccyx     ## Physical deconditioning  - PT/OT consulted     Fluids: PO  Electrolytes:WNL, will monitor  Nutrition: Moderate carb control diet  Lines:PIV  Activity: -Up as tolerated, PT, OT  CODE: Full Code  Prophylaxis: Heparin SQ  PCP communication:  - Noam Jackson  - Contacted at admission: No  - Concerns or updates: N/A  Dispo: Expected discharge date 2-3 days pending clinical improvement              Interval History:   Patient says that redness continues to spread despite antibioitics.  She is more anxious this morning and says she is concerned about losing her remaining leg.  No fevers or chills.  Swelling is progressing as well and leg it very tender to touch.  No drainage            Review of Systems:   Review of Systems   Constitutional: Negative for chills and fever.   Eyes: Negative for visual disturbance.   Respiratory: Negative for cough and shortness of breath.    Cardiovascular: Positive for leg swelling. Negative for chest pain.   Gastrointestinal: Negative for abdominal pain, constipation, diarrhea, nausea and vomiting.   Genitourinary: Negative for dysuria.   Musculoskeletal: Positive for back pain and myalgias.   Skin: Positive for rash and wound.   Neurological: Negative for light-headedness and headaches.   Psychiatric/Behavioral: The patient is nervous/anxious.           Physical Exam (Resident / Clinician):     Vitals were reviewed  Patient Vitals for the past 24 hrs:   BP Temp Temp src Pulse Heart Rate Resp SpO2 Weight   11/06/17 1525 171/58 97.3  F (36.3  C) Oral - 78 16 93 % -   11/06/17 1144 176/71 98  F (36.7  C) Oral 76 - 16 93 % -   11/06/17 0905 - - - - - - - 110.7 kg  (244 lb)   11/06/17 0614 169/58 97.7  F (36.5  C) Oral 77 - 16 92 % -   11/06/17 0429 176/55 - - - - - - -   11/05/17 2305 177/66 96.6  F (35.9  C) Oral 81 - 16 92 % -       Physical Exam   Constitutional: She is oriented to person, place, and time. She appears well-developed and well-nourished. No distress.   HENT:   Head: Normocephalic and atraumatic.   Eyes: Conjunctivae are normal.   Neck: Neck supple.   Cardiovascular: Normal rate and regular rhythm.    Pulmonary/Chest: Effort normal and breath sounds normal. No respiratory distress.   Abdominal: Soft. Bowel sounds are normal. There is no tenderness. There is no rebound and no guarding.   Musculoskeletal: She exhibits edema (2+ pitting edema right lower extremity).   Right thigh erythema and warmth noted. Erythema progressed passed marked area more inferiorly, slightly inside margin anteriorly.  Slightly darker hue of pink today on lateral thigh.  Palpable edema with marked induration along lateral right thigh, no fluctuance or expressible drainage.   Neurological: She is alert and oriented to person, place, and time.   Skin: Skin is warm and dry. She is not diaphoretic.   Psychiatric: She has a normal mood and affect.             Data:   ROUTINE LABS (Last four results)  CMP    Recent Labs  Lab 11/06/17  1654 11/06/17  0717 11/05/17  0903 11/04/17  1626 11/04/17  0810 11/03/17  0601  11/01/17  1414    143 143 142 142 144  < > 139   POTASSIUM 4.0 3.7 3.7 4.0 3.9 3.3*  < > 3.6   CHLORIDE 113* 114* 113* 113* 113* 114*  < > 106   CO2 24 20 21 22 21 22  < > 24   ANIONGAP 7 9 9 8 8 8  < > 9   * 72 106* 129* 109* 96  < > 245*   BUN 33* 31* 33* 34* 34* 42*  < > 38*   CR 3.14* 3.14* 3.30* 3.19* 3.25* 3.19*  < > 2.86*   GFRESTIMATED 15* 15* 14* 14* 14* 14*  < > 16*   GFRESTBLACK 18* 18* 17* 17* 17* 17*  < > 20*   JODY 8.2* 8.6 7.8* 8.0* 8.1* 7.5*  < > 8.4*   MAG  --  2.0 2.0 1.9 2.1 2.0  < >  --    PHOS  --  4.7*  --  4.0 3.8 4.2  --   --    PROTTOTAL  --    --   --   --   --   --   --  7.2   ALBUMIN  --   --   --   --   --   --   --  1.9*   BILITOTAL  --   --   --   --   --   --   --  0.3   ALKPHOS  --   --   --   --   --   --   --  79   AST  --   --   --   --   --   --   --  7   ALT  --   --   --   --   --   --   --  15   < > = values in this interval not displayed.  CBC    Recent Labs  Lab 11/06/17 0717 11/05/17 2019 11/05/17 0903 11/04/17  0810 11/03/17  0601   WBC 8.1  --  6.1 7.4 9.4   RBC 3.14*  --  2.57* 2.80* 2.57*   HGB 8.7* 8.5* 7.1* 7.6* 7.0*   HCT 27.5*  --  23.0* 24.6* 22.6*   MCV 88  --  90 88 88   MCH 27.7  --  27.6 27.1 27.2   MCHC 31.6  --  30.9* 30.9* 31.0*   RDW 14.5  --  14.3 14.3 14.4     --  280 310 213     INRNo lab results found in last 7 days.  CRP    Recent Labs  Lab 11/06/17 0717 11/05/17 0903 11/04/17  0810 11/03/17  0601   CRP 31.0* 50.0* 96.0* 160.0*         Blood culture:  Invalid input(s): BC   Urine culture:  No results for input(s): URC in the last 168 hours.  All cultures:    Recent Labs  Lab 11/02/17  0600 11/01/17  1550 11/01/17  1414   CULT No growth No growth after 5 days No growth after 5 days           Medications:     Current Facility-Administered Medications   Medication     amLODIPine (NORVASC) tablet 10 mg     insulin glargine (LANTUS) injection 32 Units     hydrALAZINE (APRESOLINE) tablet 25 mg     vancomycin (VANCOCIN) 1,500 mg in NaCl 0.9 % 250 mL intermittent infusion     furosemide (LASIX) tablet 80 mg     benzocaine-menthol (CEPACOL) 15-3.6 MG lozenge 1 lozenge     piperacillin-tazobactam (ZOSYN) 2.25 g vial to attach to  ml bag     hydrALAZINE (APRESOLINE) injection 10 mg     loratadine (CLARITIN) tablet 10 mg     miconazole (MICATIN; MICRO GUARD) 2 % powder     lactobacillus rhamnosus (GG) (CULTURELL) capsule 1 capsule     heparin injection 7,500 Units     albuterol (PROAIR HFA/PROVENTIL HFA/VENTOLIN HFA) Inhaler 2 puff     atorvastatin (LIPITOR) tablet 20 mg     carvedilol (COREG) tablet 25 mg      DULoxetine (CYMBALTA) EC capsule 30 mg     naloxone (NARCAN) injection 0.1-0.4 mg     polyethylene glycol (MIRALAX/GLYCOLAX) Packet 17 g     ondansetron (ZOFRAN-ODT) ODT tab 4 mg    Or     ondansetron (ZOFRAN) injection 4 mg     glucose 40 % gel 15-30 g    Or     dextrose 50 % injection 25-50 mL    Or     glucagon injection 1 mg     insulin aspart (NovoLOG) inj (RAPID ACTING)     insulin aspart (NovoLOG) inj (RAPID ACTING)     acetaminophen (TYLENOL) tablet 650 mg       Caring Physician: Brayden Carrasco MD  [facility], [place]  Pager Contact: see Physician sticky note
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
## 2017-11-06 NOTE — PLAN OF CARE
Problem: Patient Care Overview  Goal: Plan of Care/Patient Progress Review  Outcome: No Change  SBP elevated but below parameters. OVSS on RA. On call MD paged regarding new areas to erythema and warmth past previous marked border. New areas marked. Tylenol given x1 for pain. 0200 BG 75. Apple juice given. Recheck at 0600 74. Ultrasound complete. R leg elevated on pillow. IV TKO with intermittent ABX. Up to bedside commode with Ax1, voiding adequate amounts. Mepilex over coccyx wound. Will continue POC.

## 2017-11-06 NOTE — PLAN OF CARE
"Problem: Patient Care Overview  Goal: Plan of Care/Patient Progress Review  Outcome: Therapy, progress toward functional goals is gradual  VSS. C/o's minimal pain on rt leg, declined scheduled tylenol \"I don't have head ache\". Rt upper thigh/leg with redness/swelling, area marked-unchanged, pt on IV abx. Lymphedema wraps in place to lower leg and elevated as well. Noted sore on rt coccyx area, it looks like an old scar tissue that got pulled with moving in and out of bed, mepilex applied, has WOCN consult. Pt encouraged to reposition to sides. Pt tolerating diet in small amounts. BGs wnl, no insulin given. Up in commode with 1asssit. Had large BM, good uop. Daughter at bedside, very supportive of pt.   P: continue to monitor redness on rt leg, gen status and continue with POC.      "

## 2017-11-06 NOTE — PLAN OF CARE
Problem: Patient Care Overview  Goal: Plan of Care/Patient Progress Review  Discharge Planner OT   Patient plan for discharge: home with assist from family   Current status: Pt performed bed mobility with SBA, CGA for pivot transfer to and from [facility] [place]. Pt dependent in pericares while standing. Pt transferred back to EOB, needed extended rest break to recover from SOB and fatigue. Pt and Th went over PLB technique to ease anxiety and promote lung integrity. Pt then performed 2nd sit<>stand transfer with CGA, pt was able to stand for approximately ~1 minute before needing to sit. Pt returned back to supine, discussed increasing overall activity while in hospital to ensure safe return home with support from pts daughter, pt demos understanding. Pt also performed neck strengthening/stretching exercises.   Barriers to return to prior living situation: general deconditioning, fatigue, SOB   Recommendations for discharge: home with assist from daughter for heavier ADLs  Rationale for recommendations: To ensure safe task completion in home environment.        Entered by: Hyacinth Colon 11/06/2017 4:00 PM

## 2017-11-07 ENCOUNTER — APPOINTMENT (OUTPATIENT)
Dept: OCCUPATIONAL THERAPY | Facility: CLINIC | Age: 67
DRG: 603 | End: 2017-11-07
Payer: MEDICARE

## 2017-11-07 LAB
ALBUMIN SERPL-MCNC: 1.7 G/DL (ref 3.4–5)
ALP SERPL-CCNC: 75 U/L (ref 40–150)
ALT SERPL W P-5'-P-CCNC: 18 U/L (ref 0–50)
ANION GAP SERPL CALCULATED.3IONS-SCNC: 9 MMOL/L (ref 3–14)
AST SERPL W P-5'-P-CCNC: 14 U/L (ref 0–45)
BACTERIA SPEC CULT: NO GROWTH
BACTERIA SPEC CULT: NO GROWTH
BASOPHILS # BLD AUTO: 0 10E9/L (ref 0–0.2)
BASOPHILS NFR BLD AUTO: 0.4 %
BILIRUB SERPL-MCNC: 0.3 MG/DL (ref 0.2–1.3)
BUN SERPL-MCNC: 29 MG/DL (ref 7–30)
CALCIUM SERPL-MCNC: 8.8 MG/DL (ref 8.5–10.1)
CHLORIDE SERPL-SCNC: 112 MMOL/L (ref 94–109)
CO2 SERPL-SCNC: 22 MMOL/L (ref 20–32)
CREAT SERPL-MCNC: 3.11 MG/DL (ref 0.52–1.04)
CRP SERPL-MCNC: 23 MG/L (ref 0–8)
DIFFERENTIAL METHOD BLD: ABNORMAL
EOSINOPHIL # BLD AUTO: 0.2 10E9/L (ref 0–0.7)
EOSINOPHIL NFR BLD AUTO: 2.3 %
ERYTHROCYTE [DISTWIDTH] IN BLOOD BY AUTOMATED COUNT: 14.4 % (ref 10–15)
ERYTHROCYTE [SEDIMENTATION RATE] IN BLOOD BY WESTERGREN METHOD: 109 MM/H (ref 0–30)
GFR SERPL CREATININE-BSD FRML MDRD: 15 ML/MIN/1.7M2
GLUCOSE BLDC GLUCOMTR-MCNC: 112 MG/DL (ref 70–99)
GLUCOSE BLDC GLUCOMTR-MCNC: 115 MG/DL (ref 70–99)
GLUCOSE BLDC GLUCOMTR-MCNC: 119 MG/DL (ref 70–99)
GLUCOSE BLDC GLUCOMTR-MCNC: 141 MG/DL (ref 70–99)
GLUCOSE BLDC GLUCOMTR-MCNC: 167 MG/DL (ref 70–99)
GLUCOSE BLDC GLUCOMTR-MCNC: 180 MG/DL (ref 70–99)
GLUCOSE BLDC GLUCOMTR-MCNC: 44 MG/DL (ref 70–99)
GLUCOSE BLDC GLUCOMTR-MCNC: 51 MG/DL (ref 70–99)
GLUCOSE BLDC GLUCOMTR-MCNC: 54 MG/DL (ref 70–99)
GLUCOSE BLDC GLUCOMTR-MCNC: 60 MG/DL (ref 70–99)
GLUCOSE BLDC GLUCOMTR-MCNC: 77 MG/DL (ref 70–99)
GLUCOSE SERPL-MCNC: 50 MG/DL (ref 70–99)
HCT VFR BLD AUTO: 30.2 % (ref 35–47)
HGB BLD-MCNC: 9.5 G/DL (ref 11.7–15.7)
IMM GRANULOCYTES # BLD: 0 10E9/L (ref 0–0.4)
IMM GRANULOCYTES NFR BLD: 0.3 %
LYMPHOCYTES # BLD AUTO: 2.1 10E9/L (ref 0.8–5.3)
LYMPHOCYTES NFR BLD AUTO: 20.9 %
Lab: NORMAL
Lab: NORMAL
MAGNESIUM SERPL-MCNC: 2 MG/DL (ref 1.6–2.3)
MCH RBC QN AUTO: 27.8 PG (ref 26.5–33)
MCHC RBC AUTO-ENTMCNC: 31.5 G/DL (ref 31.5–36.5)
MCV RBC AUTO: 88 FL (ref 78–100)
MONOCYTES # BLD AUTO: 0.6 10E9/L (ref 0–1.3)
MONOCYTES NFR BLD AUTO: 5.6 %
NEUTROPHILS # BLD AUTO: 7.1 10E9/L (ref 1.6–8.3)
NEUTROPHILS NFR BLD AUTO: 70.5 %
NRBC # BLD AUTO: 0 10*3/UL
NRBC BLD AUTO-RTO: 0 /100
PHOSPHATE SERPL-MCNC: 4.7 MG/DL (ref 2.5–4.5)
PLATELET # BLD AUTO: 364 10E9/L (ref 150–450)
POTASSIUM SERPL-SCNC: 3.6 MMOL/L (ref 3.4–5.3)
PROT SERPL-MCNC: 7.4 G/DL (ref 6.8–8.8)
RBC # BLD AUTO: 3.42 10E12/L (ref 3.8–5.2)
SODIUM SERPL-SCNC: 142 MMOL/L (ref 133–144)
SPECIMEN SOURCE: NORMAL
SPECIMEN SOURCE: NORMAL
VANCOMYCIN SERPL-MCNC: 17.9 MG/L
WBC # BLD AUTO: 10 10E9/L (ref 4–11)

## 2017-11-07 PROCEDURE — 25000132 ZZH RX MED GY IP 250 OP 250 PS 637: Mod: GY | Performed by: FAMILY MEDICINE

## 2017-11-07 PROCEDURE — 36415 COLL VENOUS BLD VENIPUNCTURE: CPT | Performed by: FAMILY MEDICINE

## 2017-11-07 PROCEDURE — 25000128 H RX IP 250 OP 636: Performed by: FAMILY MEDICINE

## 2017-11-07 PROCEDURE — 83735 ASSAY OF MAGNESIUM: CPT | Performed by: FAMILY MEDICINE

## 2017-11-07 PROCEDURE — 97140 MANUAL THERAPY 1/> REGIONS: CPT | Mod: GO

## 2017-11-07 PROCEDURE — 85025 COMPLETE CBC W/AUTO DIFF WBC: CPT | Performed by: FAMILY MEDICINE

## 2017-11-07 PROCEDURE — 80053 COMPREHEN METABOLIC PANEL: CPT | Performed by: FAMILY MEDICINE

## 2017-11-07 PROCEDURE — 25800025 ZZH RX 258: Performed by: FAMILY MEDICINE

## 2017-11-07 PROCEDURE — A9270 NON-COVERED ITEM OR SERVICE: HCPCS | Mod: GY | Performed by: FAMILY MEDICINE

## 2017-11-07 PROCEDURE — 84100 ASSAY OF PHOSPHORUS: CPT | Performed by: FAMILY MEDICINE

## 2017-11-07 PROCEDURE — 86140 C-REACTIVE PROTEIN: CPT | Performed by: FAMILY MEDICINE

## 2017-11-07 PROCEDURE — 80202 ASSAY OF VANCOMYCIN: CPT | Performed by: FAMILY MEDICINE

## 2017-11-07 PROCEDURE — 12000008 ZZH R&B INTERMEDIATE UMMC

## 2017-11-07 PROCEDURE — 40000133 ZZH STATISTIC OT WARD VISIT

## 2017-11-07 PROCEDURE — 85652 RBC SED RATE AUTOMATED: CPT | Performed by: FAMILY MEDICINE

## 2017-11-07 PROCEDURE — 00000146 ZZHCL STATISTIC GLUCOSE BY METER IP

## 2017-11-07 RX ORDER — LISINOPRIL 40 MG/1
40 TABLET ORAL DAILY
Status: DISCONTINUED | OUTPATIENT
Start: 2017-11-07 | End: 2017-11-13 | Stop reason: HOSPADM

## 2017-11-07 RX ADMIN — CARVEDILOL 25 MG: 6.25 TABLET, FILM COATED ORAL at 17:30

## 2017-11-07 RX ADMIN — AMLODIPINE BESYLATE 10 MG: 10 TABLET ORAL at 09:01

## 2017-11-07 RX ADMIN — DULOXETINE HYDROCHLORIDE 30 MG: 30 CAPSULE, DELAYED RELEASE ORAL at 09:01

## 2017-11-07 RX ADMIN — PIPERACILLIN AND TAZOBACTAM 2.25 G: 2; .25 INJECTION, POWDER, LYOPHILIZED, FOR SOLUTION INTRAVENOUS; PARENTERAL at 09:01

## 2017-11-07 RX ADMIN — PIPERACILLIN AND TAZOBACTAM 2.25 G: 2; .25 INJECTION, POWDER, LYOPHILIZED, FOR SOLUTION INTRAVENOUS; PARENTERAL at 17:27

## 2017-11-07 RX ADMIN — HEPARIN SODIUM 7500 UNITS: 10000 INJECTION, SOLUTION INTRAVENOUS; SUBCUTANEOUS at 01:13

## 2017-11-07 RX ADMIN — DEXTROSE 30 G: 15 GEL ORAL at 08:17

## 2017-11-07 RX ADMIN — Medication 25 ML: at 05:25

## 2017-11-07 RX ADMIN — Medication 1 CAPSULE: at 20:16

## 2017-11-07 RX ADMIN — HYDRALAZINE HYDROCHLORIDE 25 MG: 25 TABLET ORAL at 17:30

## 2017-11-07 RX ADMIN — HYDRALAZINE HYDROCHLORIDE 25 MG: 25 TABLET ORAL at 09:01

## 2017-11-07 RX ADMIN — FUROSEMIDE 80 MG: 20 TABLET ORAL at 09:01

## 2017-11-07 RX ADMIN — DULOXETINE HYDROCHLORIDE 30 MG: 30 CAPSULE, DELAYED RELEASE ORAL at 20:16

## 2017-11-07 RX ADMIN — VANCOMYCIN HYDROCHLORIDE 1500 MG: 10 INJECTION, POWDER, LYOPHILIZED, FOR SOLUTION INTRAVENOUS at 20:13

## 2017-11-07 RX ADMIN — ATORVASTATIN CALCIUM 20 MG: 20 TABLET, FILM COATED ORAL at 09:01

## 2017-11-07 RX ADMIN — CARVEDILOL 25 MG: 6.25 TABLET, FILM COATED ORAL at 09:01

## 2017-11-07 RX ADMIN — Medication 1 CAPSULE: at 09:01

## 2017-11-07 RX ADMIN — HEPARIN SODIUM 7500 UNITS: 10000 INJECTION, SOLUTION INTRAVENOUS; SUBCUTANEOUS at 17:29

## 2017-11-07 RX ADMIN — LISINOPRIL 40 MG: 40 TABLET ORAL at 09:01

## 2017-11-07 RX ADMIN — PIPERACILLIN AND TAZOBACTAM 2.25 G: 2; .25 INJECTION, POWDER, LYOPHILIZED, FOR SOLUTION INTRAVENOUS; PARENTERAL at 01:23

## 2017-11-07 RX ADMIN — FUROSEMIDE 80 MG: 20 TABLET ORAL at 17:31

## 2017-11-07 RX ADMIN — HYDRALAZINE HYDROCHLORIDE 25 MG: 25 TABLET ORAL at 01:20

## 2017-11-07 ASSESSMENT — ENCOUNTER SYMPTOMS
COUGH: 0
ABDOMINAL PAIN: 0
SHORTNESS OF BREATH: 0
DIARRHEA: 0
LIGHT-HEADEDNESS: 0
WOUND: 1
FEVER: 0
VOMITING: 0
NAUSEA: 0
DYSURIA: 0
MYALGIAS: 1
CONSTIPATION: 0
HEADACHES: 0
CHILLS: 0

## 2017-11-07 ASSESSMENT — PAIN DESCRIPTION - DESCRIPTORS: DESCRIPTORS: SORE

## 2017-11-07 NOTE — PROGRESS NOTES
CLINICAL NUTRITION SERVICES  Reason for Assessment:  Nutrition education regarding Moderate consistent carb diet education and Q/A session  Diet History:   Pt with history of diabetes. She is aware of most foods that contain carbohydrates but is not good about following a consistent carbohydrate diet at home. Supriya was told by the medical team that she will need dialysis if she does not make lifestyle changes including diet. She is motivated to do this.   Her current insulin regimen at home is lantus and sliding scale insulin.   Nutrition Diagnosis:  Food- and nutrition-related knowledge deficit r/t limited adherence to nutrition-related recommendations AEB patient verbalization  Interventions:  Provided instruction on consistent carbohydrate diet. Discussed appropriate carbohydrate grams for meals (30-60g) and for snacks (15-30g). Reviewed what foods have carbohydrates. Discussed plate method as a tool for healthy eating.   Provided handouts: carbohydrate counting for people with diabetes and guide to carbohydrate counting booklet  Goals:   Patient will verbalize understanding of how many carbohydrate grams allowed with meals and snacks  Return demonstration  Follow-up:    Patient to ask any further nutrition-related questions before discharge.  In addition, pt may request outpatient RD appointment.    Ruth Tucker RD, LD  Pager: [phone]

## 2017-11-07 NOTE — PLAN OF CARE
Problem: Patient Care Overview  Goal: Plan of Care/Patient Progress Review  Edema 7C: Per discussion with MD, applied GCB from R MTP to thigh to promote fluid mobilization and increase I with mobility and ADLs. Plan for pt to wear x 24 hours, though please remove if wraps cause numbness, tingling, pain, or become soiled. Will follow up with pt. Recommend follow up in OP Lymphedema clinic for fit into custom garment.

## 2017-11-07 NOTE — PLAN OF CARE
Problem: Patient Care Overview  Goal: Plan of Care/Patient Progress Review  Outcome: Improving  VSS except high SBP, improved with meds. Redness on RLE improved, still swollen, pt on IV abx; edema wraps in place on rt lower leg. Sore on coccyx area unchanged, pt refused dressing on it, repositioning encouraged and barrier cream applied. BG was 44 in the morning, improved with prn glucose gel and apple juice-MD notified, will adjust insulin dose. Pt tolerating diet. Up with 1 to commode. LS with crackles, denies cough, IS encouraged. PIV sl'd in between IV abx.  P:continue to monitor skin status, BGs and gen status and continue with POC.    Addendum 6:44 PM  BG wnl this raven, no SSI given.

## 2017-11-07 NOTE — PROGRESS NOTES
[facility] - Inpatient daily progress note      Date of admission: 11/1/2017  Date of service: 11/7/2017       Assessment and Plan:   Assessment:   Supriya Herr is a 67 year old female with a significant past medical history of chronic kidney disease stage 3, congestive heart failure, uncontrolled diabetes type-2, HTN, HLD, chronic lymphedema, JONE and morbid obesity admitted for RLE cellulitis which is worsening clinically on IV antibiotics despite normalized labs  Patient Active Problem List   Diagnosis     Vitiligo     Traumatic amputation of leg above knee (H)     Contact dermatitis and other eczema, due to unspecified cause     Dermatophytosis of nail     Generalized osteoarthrosis, unspecified site     Hypertension goal BP (blood pressure) < 140/90     Mild nonproliferative diabetic retinopathy (H)     Proteinuria     Stage I pressure ulcer     Hyperlipidemia LDL goal <100     Non compliance with medical treatment     Tobacco abuse     Advanced directives, counseling/discussion     Incontinence of urine     Basal cell carcinoma     Senile nuclear sclerosis     JONE (obstructive sleep apnea)     CHF (congestive heart failure) (H)     Health Care Home     CKD (chronic kidney disease) stage 3, GFR 30-59 ml/min     Type 2 diabetes, HbA1C goal < 8% (H)     Type 2 diabetes mellitus with diabetic chronic kidney disease (H)     Morbid obesity (H)     Type 2 diabetes mellitus with stage 3 chronic kidney disease, without long-term current use of insulin (H)     Moderate recurrent major depression (H)     Type 2 diabetes mellitus with diabetic neuropathy, with long-term current use of insulin (H)     Cellulitis      Plan:    ## Right thigh Cellulitis: Worsening  ## Chronic lymphedema: Stable  Stable but non-improving erythema/induration of the right thigh.  CT 11/5 showed worsening fluid and swelling deep and superficial right thigh.  No concern for necrotizing fasciitis, but certainly could be  non-necrotizing fasciitis.  Surgery on board.  No fevers and improving per labs/vitasl/blood glucose.  No evidence of DVT on US. No evidence of abscess on CT or US.    - ID on board, continue vancomycin and zosyn renally dosed  - Miconazole powder to right groin  - Elevate RLE  - Serial labs    ## CKD G5  Baseline creatinine 1.8-2.1 from earlier this year, but graphing 1/creatinine vs. Time shows progressive decline of renal function which has been progressing faster over last 12 months.  - Resume lisinopril 40mg daily which is renal dosing for creatinine clearance 10-30 (PTA dose 80mg daily)  - Continue tolterodine 2mg   - Avoid nephrotoxic meds when possible, renally dose medications  - Continue home lasix 80mg BID  - strict I/O's and daily weights    - Renal US showed medical renal disease  - Urine eosinophils  - Discussed at length with patient and daughter how close the patient is to needing dialysis.  Discussed the importance of medication adherence, glucose control, BP control, diet/exercise, smoking cessation, and regular follow ups with her providers.     ## Diabetes- type 2: Uncontrolled    Uncontrolled diabetes with last Hgb A1c 9.2 (9/13/17). She follows with endocrinology for her diabetes. Daughter reports non-adherence to current diabetic regimen and that she frequently forgets meal coverage.  Glucose very well controlled on 1/2 home lantus dose; home dose may be too high for her and outpatient insulin regimen may have been increased despite non-adherence.  - Home regimen: Lantus 70u at bedtime, Novolog 6U with meals and sliding scale  - Hospital regimen: Lantus 30u at bedtime and MSSI to estimate prandial insulin needs  - Hypoglycemia protocol  - Moderate consistent carbohydrate diet     ## HTN:   - Continue PTA carvedilol 25mg BID  - Resume lisinopril (40 mg daily rather than PTA dose of 80mg daily)  - Start hydralazine 25mg PO q8h  - Continue PTA Lipitor 20 mg daily      ##HFpEF  Last echocardiogram  12/2013 with normal LV function but technically difficulty study due to patient's obesity.  HFpEF documented in chart, but no impaired diastolic filling on echo  - Continue carvedilol 25mg BID  - Hold lisinopril for renal function  - consider repeat echocardiogram as outpatient     ## Normocytic Anemia- Stable  Hgb is stable at 9.4 with baseline ~9-10. She has a history of iron deficiency anemia.   - Consider iron studies and iron supplementation as outpatient      ## Neuropathy  - continue cymbalta 30mg BID     ##HLD  - Continue lipitor 20mg daily     ## JONE  - Refuses use of CPAP since she is unable to tolerate   - Continue to encourage CPAP use, but not ordered currently     ##Stage 1 pressure ulcer on coccyx   - WOC consulted, mepilex dressing to coccyx     ## Physical deconditioning  - PT/OT consulted     Fluids: PO  Electrolytes:WNL, will monitor  Nutrition: Moderate carb control diet  Lines:PIV  Activity: -Up as tolerated, PT, OT  CODE: Full Code  Prophylaxis: Heparin SQ  PCP communication:  - Noam Jackson  - Contacted at admission: No  - Concerns or updates: N/A  Dispo: Expected discharge date 2-3 days pending clinical improvement              Interval History:   Patient says that redness is basically unchanged but the leg continues to ache and swelling is still bad.  It is tender to touch.  No fevers or chills.              Review of Systems:   Review of Systems   Constitutional: Negative for chills and fever.   Eyes: Negative for visual disturbance.   Respiratory: Negative for cough and shortness of breath.    Cardiovascular: Positive for leg swelling. Negative for chest pain.   Gastrointestinal: Negative for abdominal pain, constipation, diarrhea, nausea and vomiting.   Genitourinary: Negative for dysuria.   Musculoskeletal: Positive for myalgias.   Skin: Positive for rash and wound.   Neurological: Negative for light-headedness and headaches.          Physical Exam (Resident / Clinician):      Vitals were reviewed  Patient Vitals for the past 24 hrs:   BP Temp Temp src Pulse Heart Rate Resp SpO2 Weight   11/07/17 0353 177/63 96.4  F (35.8  C) Oral 72 - 16 92 % -   11/07/17 0120 175/65 - - 75 - - - -   11/06/17 2253 178/61 97.9  F (36.6  C) Oral - 74 16 92 % -   11/06/17 1946 166/48 98.5  F (36.9  C) Oral - 75 16 92 % -   11/06/17 1805 178/61 - - - - - - -   11/06/17 1525 171/58 97.3  F (36.3  C) Oral - 78 16 93 % -   11/06/17 1144 176/71 98  F (36.7  C) Oral 76 - 16 93 % -   11/06/17 0905 - - - - - - - 110.7 kg (244 lb)       Physical Exam   Constitutional: She is oriented to person, place, and time. She appears well-developed and well-nourished. No distress.   HENT:   Head: Normocephalic and atraumatic.   Eyes: Conjunctivae are normal.   Neck: Neck supple.   Cardiovascular: Normal rate and regular rhythm.    Pulmonary/Chest: Effort normal and breath sounds normal. No respiratory distress.   Abdominal: Soft. Bowel sounds are normal. There is no tenderness. There is no rebound and no guarding.   Musculoskeletal: She exhibits edema (2+ pitting edema right lower extremity).   Right thigh erythema and warmth noted. Unchanged margins or hue from prior exams.  Palpable edema with marked induration along lateral right thigh, no fluctuance or expressible drainage.   Neurological: She is alert and oriented to person, place, and time.   Skin: Skin is warm and dry. She is not diaphoretic.   Psychiatric: She has a normal mood and affect.             Data:   ROUTINE LABS (Last four results)  CMP    Recent Labs  Lab 11/06/17  1654 11/06/17  0717 11/05/17  0903 11/04/17  1626 11/04/17  0810 11/03/17  0601  11/01/17  1414    143 143 142 142 144  < > 139   POTASSIUM 4.0 3.7 3.7 4.0 3.9 3.3*  < > 3.6   CHLORIDE 113* 114* 113* 113* 113* 114*  < > 106   CO2 24 20 21 22 21 22  < > 24   ANIONGAP 7 9 9 8 8 8  < > 9   * 72 106* 129* 109* 96  < > 245*   BUN 33* 31* 33* 34* 34* 42*  < > 38*   CR 3.14* 3.14* 3.30*  3.19* 3.25* 3.19*  < > 2.86*   GFRESTIMATED 15* 15* 14* 14* 14* 14*  < > 16*   GFRESTBLACK 18* 18* 17* 17* 17* 17*  < > 20*   JODY 8.2* 8.6 7.8* 8.0* 8.1* 7.5*  < > 8.4*   MAG  --  2.0 2.0 1.9 2.1 2.0  < >  --    PHOS  --  4.7*  --  4.0 3.8 4.2  --   --    PROTTOTAL  --   --   --   --   --   --   --  7.2   ALBUMIN  --   --   --   --   --   --   --  1.9*   BILITOTAL  --   --   --   --   --   --   --  0.3   ALKPHOS  --   --   --   --   --   --   --  79   AST  --   --   --   --   --   --   --  7   ALT  --   --   --   --   --   --   --  15   < > = values in this interval not displayed.  CBC    Recent Labs  Lab 11/06/17 0717 11/05/17 2019 11/05/17 0903 11/04/17  0810 11/03/17  0601   WBC 8.1  --  6.1 7.4 9.4   RBC 3.14*  --  2.57* 2.80* 2.57*   HGB 8.7* 8.5* 7.1* 7.6* 7.0*   HCT 27.5*  --  23.0* 24.6* 22.6*   MCV 88  --  90 88 88   MCH 27.7  --  27.6 27.1 27.2   MCHC 31.6  --  30.9* 30.9* 31.0*   RDW 14.5  --  14.3 14.3 14.4     --  280 310 213     INRNo lab results found in last 7 days.  CRP    Recent Labs  Lab 11/06/17 0717 11/05/17 0903 11/04/17 0810 11/03/17  0601   CRP 31.0* 50.0* 96.0* 160.0*         Blood culture:  Invalid input(s): BC   Urine culture:  No results for input(s): URC in the last 168 hours.  All cultures:    Recent Labs  Lab 11/02/17  0600 11/01/17  1550 11/01/17  1414   CULT No growth No growth after 5 days No growth after 5 days           Medications:     Current Facility-Administered Medications   Medication     amLODIPine (NORVASC) tablet 10 mg     insulin glargine (LANTUS) injection 32 Units     hydrALAZINE (APRESOLINE) tablet 25 mg     vancomycin (VANCOCIN) 1,500 mg in NaCl 0.9 % 250 mL intermittent infusion     furosemide (LASIX) tablet 80 mg     benzocaine-menthol (CEPACOL) 15-3.6 MG lozenge 1 lozenge     piperacillin-tazobactam (ZOSYN) 2.25 g vial to attach to  ml bag     hydrALAZINE (APRESOLINE) injection 10 mg     loratadine (CLARITIN) tablet 10 mg     miconazole  (MICATIN; MICRO GUARD) 2 % powder     lactobacillus rhamnosus (GG) (CULTURELL) capsule 1 capsule     heparin injection 7,500 Units     albuterol (PROAIR HFA/PROVENTIL HFA/VENTOLIN HFA) Inhaler 2 puff     atorvastatin (LIPITOR) tablet 20 mg     carvedilol (COREG) tablet 25 mg     DULoxetine (CYMBALTA) EC capsule 30 mg     naloxone (NARCAN) injection 0.1-0.4 mg     polyethylene glycol (MIRALAX/GLYCOLAX) Packet 17 g     ondansetron (ZOFRAN-ODT) ODT tab 4 mg    Or     ondansetron (ZOFRAN) injection 4 mg     glucose 40 % gel 15-30 g    Or     dextrose 50 % injection 25-50 mL    Or     glucagon injection 1 mg     insulin aspart (NovoLOG) inj (RAPID ACTING)     insulin aspart (NovoLOG) inj (RAPID ACTING)     acetaminophen (TYLENOL) tablet 650 mg       Caring Physician: Brayden Carrasco MD  [facility]  Pager Contact: see Physician sticky note

## 2017-11-07 NOTE — PLAN OF CARE
Problem: Patient Care Overview  Goal: Plan of Care/Patient Progress Review  Outcome: No Change  VSS. BP max 178/61 - Hydralazine added as scheduled med. Afebrile. Redness to right upper leg unchanged - remains within outlined border. Denies pain or tenderness - declines scheduled Tylenol. Lymphedema wraps in place to lower leg. Elevated on pillow. Mepilex foam in place on coccyx. Small cut noted on left upper leg - Mepilex placed for protection when using commode. Given Zofran for reports of upset stomach. Eating regular diet with decreased appetite. Insulin given as ordered - changes explained to patient and her daughter, asking appropriate questions about diabetes management. Pivots to commode with assist of 1. Voiding, had BM today.      Continue with POC.       Addendum: Requested Tylenol at 2300.

## 2017-11-07 NOTE — PLAN OF CARE
Problem: Patient Care Overview  Goal: Plan of Care/Patient Progress Review  Outcome: Improving  Patient alert and orientated.  Denies any pain. Makes needs known. Blood pressures remains elevated max 177/63 pulse 73, but not within notifying parameters. Afebrile. Scheduled hydralazine given. Blood glucose at 0354 hours was 54. Patient alert and orientated and was not exhibiting any other signs of hypoglycemia. Patient opted to try apple juice first before dextrose as per glucose protocol. After apple juice was given blood glucose only increased to 60. 15 minutes later blood glucose decreased to 51. Dextrose 25ml given with  Immediate effect. Blood glucose increased to 115.      Redness to right upper leg unchanged - remains within outlined border.  Lymphedema wraps in place to right  lower leg. Elevated on pillow, Mepliex foam in coccyx saturated by urine and removed. Pt declined for another dressing to be placed at this time. Barrier cream applied to coccyx. Pivots to commode with assist of 1. Voiding adequate amount had small  BM over night. Denies nausea and vomiting.  Will continue with plan of care.

## 2017-11-08 ENCOUNTER — APPOINTMENT (OUTPATIENT)
Dept: OCCUPATIONAL THERAPY | Facility: CLINIC | Age: 67
DRG: 603 | End: 2017-11-08
Payer: MEDICARE

## 2017-11-08 LAB
ANION GAP SERPL CALCULATED.3IONS-SCNC: 10 MMOL/L (ref 3–14)
BASOPHILS # BLD AUTO: 0.1 10E9/L (ref 0–0.2)
BASOPHILS NFR BLD AUTO: 0.6 %
BUN SERPL-MCNC: 28 MG/DL (ref 7–30)
CALCIUM SERPL-MCNC: 8.5 MG/DL (ref 8.5–10.1)
CHLORIDE SERPL-SCNC: 113 MMOL/L (ref 94–109)
CO2 SERPL-SCNC: 24 MMOL/L (ref 20–32)
CREAT SERPL-MCNC: 3.12 MG/DL (ref 0.52–1.04)
CRP SERPL-MCNC: 15 MG/L (ref 0–8)
DIFFERENTIAL METHOD BLD: ABNORMAL
EOSINOPHIL # BLD AUTO: 0.2 10E9/L (ref 0–0.7)
EOSINOPHIL NFR BLD AUTO: 2.4 %
ERYTHROCYTE [DISTWIDTH] IN BLOOD BY AUTOMATED COUNT: 14.4 % (ref 10–15)
GFR SERPL CREATININE-BSD FRML MDRD: 15 ML/MIN/1.7M2
GLUCOSE BLDC GLUCOMTR-MCNC: 107 MG/DL (ref 70–99)
GLUCOSE BLDC GLUCOMTR-MCNC: 110 MG/DL (ref 70–99)
GLUCOSE BLDC GLUCOMTR-MCNC: 110 MG/DL (ref 70–99)
GLUCOSE BLDC GLUCOMTR-MCNC: 209 MG/DL (ref 70–99)
GLUCOSE BLDC GLUCOMTR-MCNC: 70 MG/DL (ref 70–99)
GLUCOSE BLDC GLUCOMTR-MCNC: 70 MG/DL (ref 70–99)
GLUCOSE SERPL-MCNC: 65 MG/DL (ref 70–99)
HCT VFR BLD AUTO: 29.7 % (ref 35–47)
HGB BLD-MCNC: 9.3 G/DL (ref 11.7–15.7)
IMM GRANULOCYTES # BLD: 0 10E9/L (ref 0–0.4)
IMM GRANULOCYTES NFR BLD: 0.3 %
LYMPHOCYTES # BLD AUTO: 1.6 10E9/L (ref 0.8–5.3)
LYMPHOCYTES NFR BLD AUTO: 19.7 %
MAGNESIUM SERPL-MCNC: 2 MG/DL (ref 1.6–2.3)
MCH RBC QN AUTO: 27.6 PG (ref 26.5–33)
MCHC RBC AUTO-ENTMCNC: 31.3 G/DL (ref 31.5–36.5)
MCV RBC AUTO: 88 FL (ref 78–100)
MONOCYTES # BLD AUTO: 0.4 10E9/L (ref 0–1.3)
MONOCYTES NFR BLD AUTO: 5.1 %
NEUTROPHILS # BLD AUTO: 5.7 10E9/L (ref 1.6–8.3)
NEUTROPHILS NFR BLD AUTO: 71.9 %
NRBC # BLD AUTO: 0 10*3/UL
NRBC BLD AUTO-RTO: 0 /100
PHOSPHATE SERPL-MCNC: 4.6 MG/DL (ref 2.5–4.5)
PLATELET # BLD AUTO: 315 10E9/L (ref 150–450)
POTASSIUM SERPL-SCNC: 3.6 MMOL/L (ref 3.4–5.3)
RBC # BLD AUTO: 3.37 10E12/L (ref 3.8–5.2)
SODIUM SERPL-SCNC: 147 MMOL/L (ref 133–144)
WBC # BLD AUTO: 7.9 10E9/L (ref 4–11)

## 2017-11-08 PROCEDURE — 99211 OFF/OP EST MAY X REQ PHY/QHP: CPT

## 2017-11-08 PROCEDURE — A9270 NON-COVERED ITEM OR SERVICE: HCPCS | Mod: GY | Performed by: FAMILY MEDICINE

## 2017-11-08 PROCEDURE — 85025 COMPLETE CBC W/AUTO DIFF WBC: CPT | Performed by: FAMILY MEDICINE

## 2017-11-08 PROCEDURE — 25000128 H RX IP 250 OP 636: Performed by: FAMILY MEDICINE

## 2017-11-08 PROCEDURE — 83735 ASSAY OF MAGNESIUM: CPT | Performed by: FAMILY MEDICINE

## 2017-11-08 PROCEDURE — 80048 BASIC METABOLIC PNL TOTAL CA: CPT | Performed by: FAMILY MEDICINE

## 2017-11-08 PROCEDURE — 25000132 ZZH RX MED GY IP 250 OP 250 PS 637: Mod: GY | Performed by: FAMILY MEDICINE

## 2017-11-08 PROCEDURE — 12000008 ZZH R&B INTERMEDIATE UMMC

## 2017-11-08 PROCEDURE — 00000146 ZZHCL STATISTIC GLUCOSE BY METER IP

## 2017-11-08 PROCEDURE — 36415 COLL VENOUS BLD VENIPUNCTURE: CPT | Performed by: FAMILY MEDICINE

## 2017-11-08 PROCEDURE — 97110 THERAPEUTIC EXERCISES: CPT | Mod: GO

## 2017-11-08 PROCEDURE — 86140 C-REACTIVE PROTEIN: CPT | Performed by: FAMILY MEDICINE

## 2017-11-08 PROCEDURE — 40000133 ZZH STATISTIC OT WARD VISIT

## 2017-11-08 PROCEDURE — 97140 MANUAL THERAPY 1/> REGIONS: CPT | Mod: GO

## 2017-11-08 PROCEDURE — 97530 THERAPEUTIC ACTIVITIES: CPT | Mod: GO

## 2017-11-08 PROCEDURE — 84100 ASSAY OF PHOSPHORUS: CPT | Performed by: FAMILY MEDICINE

## 2017-11-08 RX ORDER — HYDRALAZINE HYDROCHLORIDE 25 MG/1
50 TABLET, FILM COATED ORAL EVERY 8 HOURS
Status: DISCONTINUED | OUTPATIENT
Start: 2017-11-08 | End: 2017-11-09

## 2017-11-08 RX ORDER — FUROSEMIDE 20 MG
80 TABLET ORAL 2 TIMES DAILY
Status: DISCONTINUED | OUTPATIENT
Start: 2017-11-08 | End: 2017-11-13 | Stop reason: HOSPADM

## 2017-11-08 RX ORDER — FUROSEMIDE 10 MG/ML
80 INJECTION INTRAMUSCULAR; INTRAVENOUS ONCE
Status: COMPLETED | OUTPATIENT
Start: 2017-11-08 | End: 2017-11-08

## 2017-11-08 RX ADMIN — AMLODIPINE BESYLATE 10 MG: 10 TABLET ORAL at 08:14

## 2017-11-08 RX ADMIN — HEPARIN SODIUM 7500 UNITS: 10000 INJECTION, SOLUTION INTRAVENOUS; SUBCUTANEOUS at 01:52

## 2017-11-08 RX ADMIN — PIPERACILLIN AND TAZOBACTAM 2.25 G: 2; .25 INJECTION, POWDER, LYOPHILIZED, FOR SOLUTION INTRAVENOUS; PARENTERAL at 17:36

## 2017-11-08 RX ADMIN — PIPERACILLIN AND TAZOBACTAM 2.25 G: 2; .25 INJECTION, POWDER, LYOPHILIZED, FOR SOLUTION INTRAVENOUS; PARENTERAL at 09:23

## 2017-11-08 RX ADMIN — DULOXETINE HYDROCHLORIDE 30 MG: 30 CAPSULE, DELAYED RELEASE ORAL at 08:13

## 2017-11-08 RX ADMIN — Medication 1 CAPSULE: at 08:13

## 2017-11-08 RX ADMIN — INSULIN GLARGINE 20 UNITS: 100 INJECTION, SOLUTION SUBCUTANEOUS at 21:55

## 2017-11-08 RX ADMIN — FUROSEMIDE 80 MG: 10 INJECTION, SOLUTION INTRAVENOUS at 08:14

## 2017-11-08 RX ADMIN — CARVEDILOL 25 MG: 6.25 TABLET, FILM COATED ORAL at 17:39

## 2017-11-08 RX ADMIN — HYDRALAZINE HYDROCHLORIDE 25 MG: 25 TABLET ORAL at 02:02

## 2017-11-08 RX ADMIN — ATORVASTATIN CALCIUM 20 MG: 20 TABLET, FILM COATED ORAL at 08:13

## 2017-11-08 RX ADMIN — HYDRALAZINE HYDROCHLORIDE 50 MG: 25 TABLET ORAL at 17:39

## 2017-11-08 RX ADMIN — HYDRALAZINE HYDROCHLORIDE 50 MG: 25 TABLET ORAL at 09:23

## 2017-11-08 RX ADMIN — ACETAMINOPHEN 650 MG: 325 TABLET, FILM COATED ORAL at 20:12

## 2017-11-08 RX ADMIN — HEPARIN SODIUM 7500 UNITS: 10000 INJECTION, SOLUTION INTRAVENOUS; SUBCUTANEOUS at 09:23

## 2017-11-08 RX ADMIN — FUROSEMIDE 80 MG: 20 TABLET ORAL at 17:39

## 2017-11-08 RX ADMIN — CARVEDILOL 25 MG: 6.25 TABLET, FILM COATED ORAL at 08:13

## 2017-11-08 RX ADMIN — DULOXETINE HYDROCHLORIDE 30 MG: 30 CAPSULE, DELAYED RELEASE ORAL at 20:12

## 2017-11-08 RX ADMIN — ACETAMINOPHEN 650 MG: 325 TABLET, FILM COATED ORAL at 15:25

## 2017-11-08 RX ADMIN — MICONAZOLE NITRATE: 2 POWDER TOPICAL at 20:18

## 2017-11-08 RX ADMIN — LISINOPRIL 40 MG: 40 TABLET ORAL at 08:14

## 2017-11-08 RX ADMIN — Medication 1 CAPSULE: at 20:12

## 2017-11-08 RX ADMIN — HEPARIN SODIUM 7500 UNITS: 10000 INJECTION, SOLUTION INTRAVENOUS; SUBCUTANEOUS at 17:40

## 2017-11-08 RX ADMIN — PIPERACILLIN AND TAZOBACTAM 2.25 G: 2; .25 INJECTION, POWDER, LYOPHILIZED, FOR SOLUTION INTRAVENOUS; PARENTERAL at 01:59

## 2017-11-08 ASSESSMENT — ENCOUNTER SYMPTOMS
CHILLS: 0
LIGHT-HEADEDNESS: 0
HEADACHES: 0
DIARRHEA: 0
ABDOMINAL PAIN: 0
VOMITING: 0
SHORTNESS OF BREATH: 0
FEVER: 0
NAUSEA: 0
CONSTIPATION: 0
COUGH: 0
WOUND: 1
MYALGIAS: 1
DYSURIA: 0

## 2017-11-08 ASSESSMENT — PAIN DESCRIPTION - DESCRIPTORS
DESCRIPTORS: DULL
DESCRIPTORS: ACHING

## 2017-11-08 NOTE — PROGRESS NOTES
GENERAL SURGERY PROGRESS NOTE    Patient: Supriya Herr  MRN: [id number]    Subjective  No acute overnight events. Pain well controlled. Erythema receeding and patient afebrile. Voiding. BM.     Objective  Temp:  [97.4  F (36.3  C)-98.7  F (37.1  C)] 98.7  F (37.1  C)  Pulse:  [83] 83  Heart Rate:  [75-88] 83  Resp:  [16-20] 18  BP: (153-183)/(50-72) 179/65  SpO2:  [92 %-96 %] 92 %    I/O last 3 completed shifts:  In: 1225 [P.O.:1200; I.V.:25]  Out: 3850 [Urine:2950; Other:900]    General: no acute distress, resting in bed, conversant, answering questions appropriately  Resp: non labored respirations on room air  GI: abdomen soft, non tender, non distended  Ext: right thigh with erythema over lateral aspect (improved from initial marking), no superficial skin changes (no bullae, sloughing or open areas), improving edema, minimal tenderness, no tenderness with dorsi/plantar flexion or flexion at knee.   Neuro: alert, oriented, no focal deficits     Labs: Reviewed.  Significant for a falling CRP now 23 from 97, and no white count now 10.0.     Imaging: Reviewed.  Original imaging concerning for necrotizing fascitis.     Assessment & Plan  Supriya Herr is a 67 year old female with a significant past medical history who reports 1 week of RLE swelling, erythema,warmth and weeping x 1 week. Accompanied by fevers to 103, chills, ab pain, N/V. Consulted d/t concern for nec fasc. Now clinically improved with falling WBC, CRP, and benign exam and receding erythema.     Continue excellent cares per primary team. Surgery will be signing off. Please contact with questions.     Discussed with staff, Dr. Sparks.    Scribed by Gabi Beverly MS3    --  Chiquis Leonard MD  [facility] Resident PGY-1

## 2017-11-08 NOTE — PLAN OF CARE
Problem: Patient Care Overview  Goal: Plan of Care/Patient Progress Review  Outcome: Therapy, progress toward functional goals as expected  Hypertensive, otherwise VSS. Reports RLE discomfort, improved with repositioning. RLE with lymphedema wrap and elevated. Erythema decreasing from marked border. On IV antibiotics, saline locked in-between. On a moderate CHO diet. Bedtime Lantus given. Coccyx with sore, declining dressing but using barrier cream. Up with assist of 1 to the commode.

## 2017-11-08 NOTE — PHARMACY-VANCOMYCIN DOSING SERVICE
Pharmacy Vancomycin Note  Date of Service 2017  Patient's  1950   67 year old, female  Actual Body Weight: 109.8 kg     Indication: Skin and Soft Tissue Infection  Goal Trough Level: 13-18 mg/L (as requested by ID).  Start Day of Therapy:   Current Vancomycin regimen:  1500 mg IV q48h    Current estimated CrCl = Estimated Creatinine Clearance: 21.6 mL/min (based on Cr of 3.11).    Creatinine for last 3 days  2017:  9:03 AM Creatinine 3.30 mg/dL  2017:  7:17 AM Creatinine 3.14 mg/dL;  4:54 PM Creatinine 3.14 mg/dL  2017:  8:25 AM Creatinine 3.11 mg/dL    Recent Vancomycin Levels (past 3 days)  2017:  5:40 PM Vancomycin Level 17.9 mg/L    Vancomycin IV Administrations (past 72 hours)                   vancomycin (VANCOCIN) 1,500 mg in NaCl 0.9 % 250 mL intermittent infusion (mg) 1,500 mg New Bag 17 1741                Nephrotoxins and other renal medications (Future)    Start     Dose/Rate Route Frequency Ordered Stop    17 0800  lisinopril (PRINIVIL/ZESTRIL) tablet 40 mg      40 mg Oral DAILY 17 0649      17 1530  vancomycin (VANCOCIN) 1,500 mg in NaCl 0.9 % 250 mL intermittent infusion      1,500 mg  over 90 Minutes Intravenous EVERY 48 HOURS 17 1520      17 0800  furosemide (LASIX) tablet 80 mg      80 mg Oral 2 TIMES DAILY 17 1840      17 1626  piperacillin-tazobactam (ZOSYN) 2.25 g vial to attach to  ml bag      2.25 g  over 30 Minutes Intravenous EVERY 8 HOURS 17 0955             Contrast Orders - past 72 hours     None        Interpretation of levels and current regimen:  Trough level is Therapeutic.    Has serum creatinine changed > 50% in last 72 hours: No  Urine output: diminished urine output    Plan:  1.  Continue Current Dose  2.  Pharmacy will check trough levels as appropriate in 3-5 Days.    3. Serum creatinine levels will be ordered every other day for at least 10 days while on concomitant  nephrotoxins.      Ciara Austin, PharmD  [phone] (text capable)

## 2017-11-08 NOTE — PROGRESS NOTES
[facility] ID SERVICE: ONGOING CONSULTATION   Supriya Herr : 1950 Sex: female:   Medical record number [id number] Attending Physician: Demetria Juan DO  Date of Service: 2017    PROBLEM LIST:   #Moderate to severe RLE Cellulitis - improving  #Intertrigo  #Lymphedema  #Obesity  #T2DM  #CKD  #HFpEF, HTN, HLD  #Stage I Pressure Ulcer    RECOMMENDATIONS:   - Continue Vancomycin (goal trough 13-18 mg/L) and Piperacillin-Tazobactam  - Miconazole powder to inguinal region and pannus    DISCUSSION:   Ms. Herr is a 66yo woman with multiple medical problems including chronic lymphedema of the right leg, obesity and diabetes who presented with progressive erythema of the leg/thigh, fevers and chills. Initial CT imaging was not concerning for necrotizing fasciitis. However, the area of erythema progressed and repeat CT imaging showed a deep fluid collection with worsening cellulitis. Despite progression on imaging, WBC and CRP downtrended and her exam was not suggestive of necrotizing fasciitis. Given the severity of her infection, would aim for a higher Vancomycin trough of 13-18 mg/L and monitor for improvement. Would also continue Piperacillin-Tazobactam for now given her tinea cruris infection and possible portal for gram negative pathogens, though less likely. Cellulitis is improving, just slowly and warrants ongoing IV therapy.    Above discussed with Infectious Diseases Staff, Dr. Amy Sandoval.  ID will continue to follow.      Karlene Mayers D.O.  [facility] ID Fellow  [phone]       CHIEF INFECTIOUS DISEASES COMPLAINT:  Non-responding cellulitis    INTERVAL HISTORY:   Doing well today. The leg continues to be red, but she feels it is less swollen, slightly less tender, and the erythema is receeding. No fevers, chills, fatigue, sweats.    ROS: A five-point review of systems was obtained and was negative with the exception of that which is described above.  Allergies   Allergen  "Reactions     Nkda [No Known Drug Allergies]      Allergies were reviewed.    No current outpatient prescriptions on file.       CURRENT ANTI-INFECTIVES:   Clindamycin 11/4-11/5  Vancomycin 11/1-present  Piperacillin-Tazobactam 11/1-present    EXAMINATION:   /56 (BP Location: Left arm)  Pulse 78  Temp 98.1  F (36.7  C) (Oral)  Resp 18  Ht 1.67 m (5' 5.75\")  Wt 105.9 kg (233 lb 6.4 oz)  SpO2 96%  BMI 37.96 kg/m2  GENERAL:  Well-developed, well-nourished, sitting on commode in no acute distress.   EYES:  Eyes have anicteric sclerae without conjunctival injection.  ENT:  Oropharynx is moist without exudates or ulcers.  NECK:  Supple. No cervical lymphadenopathy.  LUNGS:  Normal respiratory effort. Lung fields with mild bibasilar crackles.  CARDIOVASCULAR:  Regular rate and rhythm with no murmurs, gallops or rubs.  ABDOMEN:  Normal bowel sounds, soft, nontender. No appreciable hepatosplenomegaly.  SKIN:  RLE wrapped with ACE wrap and not examined today, erythema lateral to the knee improving from yesterday, the erythematous area over the lateral thigh extending towards the hip is slightly less red, less painful to palpation and receeding from the margins (minimally), no crepitus, severe pain on palpation, or fluctance. Intertrigo under panus.   EXT: L AKA  NEUROLOGIC:  Grossly nonfocal. Active x4 extremities.  PSYCH: Appropriate affect. Alert and oriented to person, place and time.  CURRENT LINES: R forearm PIVs x2    NEW DATA/RESULTS:   All interval basic labs, microbiology results and imaging were reviewed.    Culture Micro   Date Value Ref Range Status   11/02/2017 No growth  Final   11/01/2017 No growth  Final   11/01/2017 No growth  Final   10/26/2016 (A)  Final    50,000 to 100,000 colonies/mL Escherichia coli  10,000 to 50,000 colonies/mL Beta hemolytic Streptococcus group B Beta Hemolytic   Streptococcus groups A and B are susceptible to ampicillin, penicillin,   vancomycin, and the cephalosporins.  " Susceptibility testing is not routinely   done on these organisms isolated from urine.     06/11/2015 (A)  Final    >100,000 colonies/mL Escherichia coli  10,000 to 50,000 colonies/mL mixed urogenital shree Susceptibility testing not   routinely done         Recent Labs   Lab Test  11/08/17   0810  11/07/17   0825  11/06/17   0717  11/05/17   0903  11/04/17   0810  11/03/17   0601   CRP  15.0*  23.0*  31.0*  50.0*  96.0*  160.0*     Recent Labs   Lab Test  11/08/17   0810  11/07/17   0825  11/06/17   0717  11/05/17   0903  11/04/17   0810  11/03/17   0601   WBC  7.9  10.0  8.1  6.1  7.4  9.4     Recent Labs   Lab Test  11/08/17   0810  11/07/17   0825  11/06/17   1654  11/06/17   0717   CR  3.12*  3.11*  3.14*  3.14*   GFRESTIMATED  15*  15*  15*  15*       Hematology Studies  Recent Labs   Lab Test  11/08/17   0810  11/07/17   0825  11/06/17   0717  11/05/17   0903  11/04/17   0810  11/03/17   0601   WBC  7.9  10.0  8.1  6.1  7.4  9.4   ANEU  5.7  7.1  5.6  4.3  5.4  7.3   AEOS  0.2  0.2  0.2  0.2  0.2  0.2   HCT  29.7*  30.2*  27.5*  23.0*  24.6*  22.6*   PLT  315  364  333  280  310  213       Metabolic  Recent Labs   Lab Test  11/08/17   0810  11/07/17   0825  11/06/17   1654   NA  147*  142  144   BUN  28  29  33*   CO2  24  22  24   CR  3.12*  3.11*  3.14*   GFRESTIMATED  15*  15*  15*       Hepatic Studies  Recent Labs   Lab Test  11/07/17   0825  11/01/17   1414  09/21/17   1439   BILITOTAL  0.3  0.3  0.2   ALKPHOS  75  79  71   ALBUMIN  1.7*  1.9*  2.5*   AST  14  7  12   ALT  18  15  17       Immunologlobulins  Recent Labs   Lab Test  11/07/17   0825  11/06/17   0717 11/05/17   0903 03/22/17   0803   IGG   --    --    --    --   1090   IGM   --    --    --    --   147   IGA   --    --    --    --   368   SED  109*  >140*  >145*   < >   --     < > = values in this interval not displayed.

## 2017-11-08 NOTE — PLAN OF CARE
Problem: Patient Care Overview  Goal: Plan of Care/Patient Progress Review  Edema/7C: Pt with good tolerance of full leg wraps, donned and intact. Pt presenting with non-pitting in feet an distal LEs, proximal LEs with 2+ pitting into thighs. Skin intact with redness on lateral aspect of thigh, within drawn borders. Skin cares performed prior to reapplication of GCB with sween 24 from MTPs to knee creases for continued edema management and protection of skin integrity. Okay to wear x 24-48 hours until therapist returns. Please remove compression if causing numbness, tingling, pain, or becomes soiled.

## 2017-11-08 NOTE — PROGRESS NOTES
[facility] ID SERVICE: ONGOING CONSULTATION   Supriya Herr : 1950 Sex: female:   Medical record number [id number] Attending Physician: Demetria Juan DO  Date of Service: 2017    PROBLEM LIST:   #Moderate to severe RLE Cellulitis  #Tinea Cruris/Intertrigo  #Lymphedema  #Obesity  #T2DM  #CKD  #HFpEF, HTN, HLD  #Stage I Pressure Ulcer    RECOMMENDATIONS:   - Continue Vancomycin (goal trough 13-18 mg/L) and Piperacillin-Tazobactam  - Miconazole powder to inguinal region and pannus    DISCUSSION:   Ms. Herr is a 66yo woman with multiple medical problems including chronic lymphedema of the right leg, obesity and diabetes who presented with progressive erythema of the leg/thigh, fevers and chills. Initial CT imaging was not concerning for necrotizing fasciitis. However, the area of erythema progressed and repeat CT imaging showed a deep fluid collection with worsening cellulitis. Despite progression on imaging, WBC and CRP downtrended and her exam was not suggestive of necrotizing fasciitis. Given the severity of her infection, would aim for a higher Vancomycin trough of 13-18 mg/L and monitor for improvement. Would also continue Piperacillin-Tazobactam for now given her tinea cruris infection and possible portal for gram negative pathogens, though less likely.     Above discussed with Infectious Diseases Staff, Dr. Amy Sandoval.  ID will continue to follow.      Karlene Mayers D.O.  [facility] ID Fellow  [phone]       CHIEF INFECTIOUS DISEASES COMPLAINT:  Non-responding cellulitis    INTERVAL HISTORY:   Patient endorses slight improvement in the leg - less tender to palpation and feels the margins are receeding slightly. Denies hip or knee pain aside from her chronic OA pain. No fevers, chills, fatigue, sweats.    ROS: A five-point review of systems was obtained and was negative with the exception of that which is described above.  Allergies   Allergen Reactions     Nkda [No Known  "Drug Allergies]      Allergies were reviewed.    No current outpatient prescriptions on file.       CURRENT ANTI-INFECTIVES:   Clindamycin 11/4-11/5  Vancomycin 11/1-present  Piperacillin-Tazobactam 11/1-present    EXAMINATION:   /59 (BP Location: Right arm)  Pulse 72  Temp 97.5  F (36.4  C) (Oral)  Resp 20  Ht 1.67 m (5' 5.75\")  Wt 109.8 kg (242 lb)  SpO2 94%  BMI 39.36 kg/m2  GENERAL:  Well-developed, well-nourished, in bed in no acute distress.   EYES:  Eyes have anicteric sclerae without conjunctival injection.  ENT:  Oropharynx is moist without exudates or ulcers.  NECK:  Supple. No cervical lymphadenopathy.  LUNGS:  Normal respiratory effort. Lung fields with mild bibasilar crackles.  CARDIOVASCULAR:  Regular rate and rhythm with no murmurs, gallops or rubs.  ABDOMEN:  Normal bowel sounds, soft, nontender. No appreciable hepatosplenomegaly.  SKIN:  RLE wrapped with ACE wrap and not examined today, erythema lateral to the knee improving from yesterday, the erythematous area over the lateral thigh extending towards the hip is slighyly less red, less painful to palpation and receeding from the margins (minimally), no crepitus, severe pain on palpation, or fluctance. Intertrigo under panus.   EXT: L AKA  NEUROLOGIC:  Grossly nonfocal. Active x4 extremities.  PSYCH: Appropriate affect. Alert and oriented to person, place and time.  CURRENT LINES: R forearm PIVs x2    NEW DATA/RESULTS:   All interval basic labs, microbiology results and imaging were reviewed.    Culture Micro   Date Value Ref Range Status   11/02/2017 No growth  Final   11/01/2017 No growth  Final   11/01/2017 No growth  Final   10/26/2016 (A)  Final    50,000 to 100,000 colonies/mL Escherichia coli  10,000 to 50,000 colonies/mL Beta hemolytic Streptococcus group B Beta Hemolytic   Streptococcus groups A and B are susceptible to ampicillin, penicillin,   vancomycin, and the cephalosporins.  Susceptibility testing is not routinely   done " on these organisms isolated from urine.     06/11/2015 (A)  Final    >100,000 colonies/mL Escherichia coli  10,000 to 50,000 colonies/mL mixed urogenital shree Susceptibility testing not   routinely done         Recent Labs   Lab Test  11/07/17   0825  11/06/17   0717  11/05/17   0903  11/04/17   0810  11/03/17   0601  11/02/17   0828   CRP  23.0*  31.0*  50.0*  96.0*  160.0*  220.0*     Recent Labs   Lab Test  11/07/17   0825  11/06/17   0717  11/05/17   0903  11/04/17   0810  11/03/17   0601  11/02/17   0828   WBC  10.0  8.1  6.1  7.4  9.4  14.5*     Recent Labs   Lab Test  11/07/17   0825  11/06/17   1654  11/06/17   0717  11/05/17   0903   CR  3.11*  3.14*  3.14*  3.30*   GFRESTIMATED  15*  15*  15*  14*       Hematology Studies  Recent Labs   Lab Test  11/07/17   0825  11/06/17   0717  11/05/17   0903  11/04/17   0810  11/03/17   0601  11/02/17   0828   WBC  10.0  8.1  6.1  7.4  9.4  14.5*   ANEU  7.1  5.6  4.3  5.4  7.3  11.9*   AEOS  0.2  0.2  0.2  0.2  0.2  0.1   HCT  30.2*  27.5*  23.0*  24.6*  22.6*  24.8*   PLT  364  333  280  310  213  208       Metabolic  Recent Labs   Lab Test  11/07/17   0825  11/06/17   1654  11/06/17   0717   NA  142  144  143   BUN  29  33*  31*   CO2  22  24  20   CR  3.11*  3.14*  3.14*   GFRESTIMATED  15*  15*  15*       Hepatic Studies  Recent Labs   Lab Test  11/07/17   0825  11/01/17   1414  09/21/17   1439   BILITOTAL  0.3  0.3  0.2   ALKPHOS  75  79  71   ALBUMIN  1.7*  1.9*  2.5*   AST  14  7  12   ALT  18  15  17       Immunologlobulins  Recent Labs   Lab Test  11/07/17   0825  11/06/17   0717 11/05/17   0903 03/22/17   0803   IGG   --    --    --    --   1090   IGM   --    --    --    --   147   IGA   --    --    --    --   368   SED  109*  >140*  >145*   < >   --     < > = values in this interval not displayed.

## 2017-11-08 NOTE — PLAN OF CARE
Problem: Patient Care Overview  Goal: Plan of Care/Patient Progress Review  Discharge Planner OT   Patient plan for discharge: home with assist  Current status: Pt completing pivot transfers bed <> w/c x 4 with close SBA only. Pt's daughter brought in new w/c for pt, but it was too small. Pt self propelled in hospital w/c x ~300 feet with occasional bumping into hallway items.  Barriers to return to prior living situation: medical appropriateness  Recommendations for discharge: home with daughter's assist (daughter is PCA)  Rationale for recommendations: Pt appears near functional baseline though demonstrates some deconditioning. Anticipate that pt will be able to return home with daughter's assist for ADLs/IADLs PRN.        Entered by: Jackie Atkinson 11/08/2017 3:03 PM     OT 7C

## 2017-11-08 NOTE — PLAN OF CARE
"Problem: Patient Care Overview  Goal: Plan of Care/Patient Progress Review  Outcome: Therapy, progress toward functional goals is gradual  VSS except consistently high SBP in the 160s, MD aware, hydralazine increased today. Denies pain. RLE cellulitis improving, leg with intact lymphedema wraps, pt on IV abx. Pt tolerating small amounts of meals, BGs in the 70-100s, low mostly early in the morning, pt stated \"appetite is just not good\". Pt up to commode with 1assist, large uop, has smear to small amount of soft stools with every void. Coccyx remains red, barrier cream applied, encouraged to reposition. IS/CDB encouraged, LS dim/crackles in the bases.   P: Continue to monitor VS/BG, skin status and continue with POC.      "

## 2017-11-08 NOTE — PROGRESS NOTES
Care Coordinator Progress Note     Admission Date/Time:  11/1/2017  Attending MD:  Shyam Barcenas MD     Data  Chart reviewed, discussed with interdisciplinary team.   Patient was admitted for: Cellulitis of right leg.    Concerns with insurance coverage for discharge needs: None.  Current Living Situation: Pt lives in a duplex on the bottom floor states her daughter and her SO live on the top floor with her 3 y/o grandson. A ramp is presnet in front of the home and all needs can be met on the main level. Patiens wheelchair is at home, daughter will bring it at d/c.  Support System: Supportive and Involved  Services Involved: PCA (daughter is PCA)  Transportation: daughter will provide transport  Barriers to Discharge: improvement in cellulitits    Coordination of Care  11/8: Patient continues with moderate to severe cellulitis showing no to slight improvement on a daily basis. Infectious Disease and Surgery were consulted and have advised Primary Team to continue with IV Vanco and Zosyn. Therapies and Lymphedema are following as well. Dispo plan continues to be Home to duplex patient shares with daughter with Home Care Services once patient is deemed medically ready.    11/3: RNCC met with patient at bed side to introduce and explain role in discharge planning.  Patient was unable to recall how many hours of PCA care daughter is authorized to provide nor her county worker information. She requested I speak to her daughter. RNCC provided patient with options for Home Care - patient requested I speak with her daughter to selection an agency and check benfits. RNCC was unable to connect with patients daughter as she was busy as work on a conference call, tried calling again an hour later and was unable to reach her. Home with on oral antibiotic.    Patient needs Home Care Lymphedema and Home WOC RN, patient does not want HH PT.     11/3@1600 Daughter called and chose [facility]; benefit check called  in.    [facility]  Phone  [phone]  Fax  [phone]    RN skilled nursing visit.   RN to assess respiratory and cardiac status, pain level and activity tolerance, wound for increased signs/symptoms of infection, hydration, nutrition and bowel status and home safety.  RN to provide and teach medication management and set-up every week.  RN to provide and teach wound care 2-3 times per week.    OT to provide lymphedema treatment 2-3 times per week    Your provider has ordered home care nursing services.   If you have not been contacted within 2 days of your discharge please call the inpatient department phone number at [phone] .     Plan  Anticipated Discharge Date:  TBD  Anticipated Discharge Plan:  Home with Home Care      Hellen DE LEÓNN RN PHN  Patient Care Management Coordinator for    [facility], [facility], and 5A & 5B Off Service Patients  [facility]  Phone: [phone]  Pager: [phone]

## 2017-11-08 NOTE — PROGRESS NOTES
[facility] Nurse Inpatient Adult Pressure InjuryAssessment    Follow up assessment  Reason for consultation: Evaluate and treat coccyx wound     Assessment:   Coccyx wound due to Pressure Injury- full thickness    Pressure Injury is:  Identified as Stage 1 in admission charting but appearance is more consistent with a full thickness injury (3 vs 4) that developed hyperkeratotic tissue.  Now healed, continues to have some hyperkeratotic tissue on left lateral side near coccyx.   Status: healed with scar tissue  Stable    TREATMENT  PLAN    Coccyx wound: Cover with mepilex border dressing for protection. Change every 5-7 days and as needed  Orders reviewed and updated  [facility] Nurse follow-up plan:signing off  Nursing to notify the Provider(s) and re-consult the [facility] Nurse if wound(s) deteriorates or new skin concern.    Patient History  According to provider note(s):  67 year old female with a significant past medical history of chronic kidney disease stage 3, congestive heart failure, diabetes, HTN, HLD, chronic lymphedema, JONE and morbid obesity admitted for RLE cellulitis.  Objective Data   Containment of urine/stool: Diaper    Current Diet/ Nutrition:    Active Diet Order      Combination Diet 1740-5581 Calories: Moderate Consistent CHO (4-6 CHO units/meal)    Output:   I/O last 3 completed shifts:  In: 1225 [P.O.:1200; I.V.:25]  Out: 3850 [Urine:2950; Other:900]    Skin Assessment: Ben Ben Score  Avg: 15.2  Min: 14  Max: 17                               Labs:     Recent Labs  Lab 11/08/17  0810 11/07/17  0825   HGB 9.3* 9.5*   WBC 7.9 10.0   CRP  --  23.0*       Physical Exam    Skin assessment:   Focused skin inspection: buttocks    Wound Location:  Coccyx   Wound History: wound present on admission.  Pt spends the majority of the day in a wheelchair.  Pt states that the current cushion is old and she has a new cushion ordered.  She is able to shift her weight from side to side. Reinforced importance of turning and  repositioning. Verbalized understanding.  Measurements (length x width x depth, in cm)- pink scar tissue, 3 cm of raised scar tissue scar tissue on left lateral aspect of the coccyx.  No open areas noted  Tunneling N/A  Undermining N/A  Palpation of the wound bed: normal   Periwound skin: intact  Temperature: normal   Drainage:, none  Odor: none  Pain: denies ,     Interventions  Current support surface: Standard  Atmos Air    Current off-loading measures: Pillows under calves  Visual inspection of wound(s) completed  Wound Care:was done per plan of care    Supplies: Gathered  Education provided today: offloading     Discussed plan of care with Patient and Nurse    Face to face time: 10 minutes

## 2017-11-08 NOTE — PROGRESS NOTES
[facility] - Inpatient daily progress note      Date of admission: 11/1/2017  Date of service: 11/8/2017       Assessment and Plan:   Assessment:   Supriya Herr is a 67 year old female with a significant past medical history of chronic kidney disease stage 3, congestive heart failure, uncontrolled diabetes type-2, HTN, HLD, chronic lymphedema, JONE and morbid obesity admitted for RLE cellulitis which is worsening clinically on IV antibiotics despite normalized labs  Patient Active Problem List   Diagnosis     Vitiligo     Traumatic amputation of leg above knee (H)     Contact dermatitis and other eczema, due to unspecified cause     Dermatophytosis of nail     Generalized osteoarthrosis, unspecified site     Hypertension goal BP (blood pressure) < 140/90     Mild nonproliferative diabetic retinopathy (H)     Proteinuria     Stage I pressure ulcer     Hyperlipidemia LDL goal <100     Non compliance with medical treatment     Tobacco abuse     Advanced directives, counseling/discussion     Incontinence of urine     Basal cell carcinoma     Senile nuclear sclerosis     JONE (obstructive sleep apnea)     CHF (congestive heart failure) (H)     Health Care Home     CKD (chronic kidney disease) stage 3, GFR 30-59 ml/min     Type 2 diabetes, HbA1C goal < 8% (H)     Type 2 diabetes mellitus with diabetic chronic kidney disease (H)     Morbid obesity (H)     Type 2 diabetes mellitus with stage 3 chronic kidney disease, without long-term current use of insulin (H)     Moderate recurrent major depression (H)     Type 2 diabetes mellitus with diabetic neuropathy, with long-term current use of insulin (H)     Cellulitis      Plan:    ## Right thigh Cellulitis: Stable  ## Chronic lymphedema: Worsening  Stable but not significantly improving erythema/induration of the right thigh.  CT 11/5 showed worsening fluid and swelling deep and superficial right thigh.  No concern for necrotizing fasciitis, but certainly  could be non-necrotizing fasciitis.  Surgery on board.  WBC normalized.  Inflammatory markers normalizing.  No evidence of DVT on US. No evidence of abscess on CT or US.    - Lymphedema wrap of right thigh/leg  - Pt up 7 kg from admission, will give IV lasix today x 1 then resume home lasix 80mg BID; consider fluid restriction  - ID on board, continue vancomycin and zosyn renally dosed  - Miconazole powder to right groin  - Elevate RLE as tolerated  - Serial labs    ## CKD G5  Baseline creatinine 1.8-2.1 from earlier this year, but graphing 1/creatinine vs. Time shows progressive decline of renal function which has been progressing faster over last 12 months. Current GFR (13-16) likely new baseline.  - Continue lisinopril 40mg daily which is renal dosing for creatinine clearance 10-30 (PTA dose 80mg daily)  - Continue tolterodine 2mg   - Avoid nephrotoxic meds when possible, renally dose medications  - Continue home lasix 80mg BID, 80mg IV lasix x 1 dose this morning then resume PO dosing  - strict I/O's and daily weights    - Renal US showed medical renal disease     ## Diabetes- type 2: Uncontrolled    Uncontrolled diabetes with last Hgb A1c 9.2 (9/13/17).  Low fasting glucoses this admission, likely related to both medication adherence while hospitalized, improved diet while hospitalized, and poor appetite.  - Home regimen: Lantus 70u at bedtime, Novolog 6U with meals and sliding scale  - Hospital regimen: Lantus 20u at bedtime and LSSI to estimate prandial insulin needs  - Hypoglycemia protocol  - Moderate consistent carbohydrate diet     ## HTN:   - Continue PTA carvedilol 25mg BID  - Continue lisinopril (40 mg daily rather than PTA dose of 80mg daily)  - Increase hydralazine from 25mg to 50mg PO q8h  - Continue PTA amlodipine 10mg daily  - Continue furosemide 80mg BID  - Continue PTA Lipitor 20 mg daily      ##HFpEF  Last echocardiogram 12/2013 with normal LV function but technically difficulty study due to  patient's obesity.  HFpEF documented in chart, but no impaired diastolic filling on echo  - Continue carvedilol 25mg BID  - consider repeat echocardiogram as outpatient     ## Normocytic Anemia- Stable  Hgb is stable with baseline ~9-10. She has a history of iron deficiency anemia.   - Consider iron studies and iron supplementation as outpatient      ## Neuropathy  - continue cymbalta 30mg BID     ##HLD  - Continue lipitor 20mg daily     ## JONE  - Refuses use of CPAP since she is unable to tolerate   - Continue to encourage CPAP use, but not ordered currently     ##Stage 1 pressure ulcer on coccyx   - WOC consulted, mepilex dressing to coccyx which patient is refusing currently     ## Physical deconditioning  - PT/OT consulted, encourage OOB     Fluids: PO  Electrolytes:WNL, will monitor  Nutrition: Moderate carb control diet  Lines:PIV  Activity: -Up as tolerated, PT, OT  CODE: Full Code  Prophylaxis: Heparin SQ  PCP communication:  - Noam Jackson  - Contacted at admission: No  - Concerns or updates: N/A  Dispo: Expected discharge date 2-3 days pending clinical improvement              Interval History:   Patient says that redness is a little better, and swelling is improved where wrap was present.  No worsening pain.  No fevers or chills.              Review of Systems:   Review of Systems   Constitutional: Negative for chills and fever.   Eyes: Negative for visual disturbance.   Respiratory: Negative for cough and shortness of breath.    Cardiovascular: Positive for leg swelling. Negative for chest pain.   Gastrointestinal: Negative for abdominal pain, constipation, diarrhea, nausea and vomiting.   Genitourinary: Negative for dysuria.   Musculoskeletal: Positive for myalgias.   Skin: Positive for rash and wound.   Neurological: Negative for light-headedness and headaches.          Physical Exam (Resident / Clinician):     Vitals were reviewed  Patient Vitals for the past 24 hrs:   BP Temp Temp src  Pulse Heart Rate Resp SpO2 Weight   11/08/17 0336 171/59 98.2  F (36.8  C) Oral 83 - 16 94 % -   11/08/17 0200 175/55 98.3  F (36.8  C) Oral - 83 16 94 % -   11/07/17 2226 183/72 97.4  F (36.3  C) Oral - 88 18 92 % -   11/07/17 1403 157/59 97.5  F (36.4  C) Oral - 81 20 94 % -   11/07/17 1254 153/50 98.1  F (36.7  C) Oral - 75 18 96 % 109.8 kg (242 lb)   11/07/17 0711 177/63 98.4  F (36.9  C) Oral - 73 20 95 % -       Physical Exam   Constitutional: She is oriented to person, place, and time. She appears well-developed and well-nourished. No distress.   HENT:   Head: Normocephalic and atraumatic.   Eyes: Conjunctivae are normal.   Neck: Neck supple.   Cardiovascular: Normal rate and regular rhythm.    Pulmonary/Chest: Effort normal and breath sounds normal. No respiratory distress.   Abdominal: Soft. Bowel sounds are normal. There is no tenderness. There is no rebound and no guarding.   Musculoskeletal: She exhibits edema (2+ pitting edema right lower extremity).   Right thigh erythema and warmth noted. Unchanged margins or hue from prior exams.  Palpable edema with marked induration along lateral right thigh, no fluctuance or expressible drainage.   Neurological: She is alert and oriented to person, place, and time.   Skin: Skin is warm and dry. She is not diaphoretic.   Psychiatric: She has a normal mood and affect.             Data:   ROUTINE LABS (Last four results)  CMP    Recent Labs  Lab 11/07/17  0825 11/06/17  1654 11/06/17  0717 11/05/17  0903 11/04/17  1626 11/04/17  0810  11/01/17  1414    144 143 143 142 142  < > 139   POTASSIUM 3.6 4.0 3.7 3.7 4.0 3.9  < > 3.6   CHLORIDE 112* 113* 114* 113* 113* 113*  < > 106   CO2 22 24 20 21 22 21  < > 24   ANIONGAP 9 7 9 9 8 8  < > 9   GLC 50* 171* 72 106* 129* 109*  < > 245*   BUN 29 33* 31* 33* 34* 34*  < > 38*   CR 3.11* 3.14* 3.14* 3.30* 3.19* 3.25*  < > 2.86*   GFRESTIMATED 15* 15* 15* 14* 14* 14*  < > 16*   GFRESTBLACK 18* 18* 18* 17* 17* 17*  < > 20*    JODY 8.8 8.2* 8.6 7.8* 8.0* 8.1*  < > 8.4*   MAG 2.0  --  2.0 2.0 1.9 2.1  < >  --    PHOS 4.7*  --  4.7*  --  4.0 3.8  < >  --    PROTTOTAL 7.4  --   --   --   --   --   --  7.2   ALBUMIN 1.7*  --   --   --   --   --   --  1.9*   BILITOTAL 0.3  --   --   --   --   --   --  0.3   ALKPHOS 75  --   --   --   --   --   --  79   AST 14  --   --   --   --   --   --  7   ALT 18  --   --   --   --   --   --  15   < > = values in this interval not displayed.  CBC    Recent Labs  Lab 11/07/17 0825 11/06/17 0717 11/05/17  2019 11/05/17  0903 11/04/17  0810   WBC 10.0 8.1  --  6.1 7.4   RBC 3.42* 3.14*  --  2.57* 2.80*   HGB 9.5* 8.7* 8.5* 7.1* 7.6*   HCT 30.2* 27.5*  --  23.0* 24.6*   MCV 88 88  --  90 88   MCH 27.8 27.7  --  27.6 27.1   MCHC 31.5 31.6  --  30.9* 30.9*   RDW 14.4 14.5  --  14.3 14.3    333  --  280 310     INRNo lab results found in last 7 days.  CRP    Recent Labs  Lab 11/07/17  0825 11/06/17 0717 11/05/17  0903 11/04/17  0810   CRP 23.0* 31.0* 50.0* 96.0*         Blood culture:  Invalid input(s): BC   Urine culture:  No results for input(s): URC in the last 168 hours.  All cultures:    Recent Labs  Lab 11/02/17  0600 11/01/17  1550 11/01/17  1414   CULT No growth No growth No growth           Medications:     Current Facility-Administered Medications   Medication     furosemide (LASIX) tablet 80 mg     furosemide (LASIX) injection 80 mg     hydrALAZINE (APRESOLINE) tablet 50 mg     lisinopril (PRINIVIL/ZESTRIL) tablet 40 mg     insulin glargine (LANTUS) injection 25 Units     insulin aspart (NovoLOG) inj (RAPID ACTING)     insulin aspart (NovoLOG) inj (RAPID ACTING)     amLODIPine (NORVASC) tablet 10 mg     vancomycin (VANCOCIN) 1,500 mg in NaCl 0.9 % 250 mL intermittent infusion     benzocaine-menthol (CEPACOL) 15-3.6 MG lozenge 1 lozenge     piperacillin-tazobactam (ZOSYN) 2.25 g vial to attach to  ml bag     hydrALAZINE (APRESOLINE) injection 10 mg     loratadine (CLARITIN) tablet 10 mg      miconazole (MICATIN; MICRO GUARD) 2 % powder     lactobacillus rhamnosus (GG) (CULTURELL) capsule 1 capsule     heparin injection 7,500 Units     albuterol (PROAIR HFA/PROVENTIL HFA/VENTOLIN HFA) Inhaler 2 puff     atorvastatin (LIPITOR) tablet 20 mg     carvedilol (COREG) tablet 25 mg     DULoxetine (CYMBALTA) EC capsule 30 mg     naloxone (NARCAN) injection 0.1-0.4 mg     polyethylene glycol (MIRALAX/GLYCOLAX) Packet 17 g     ondansetron (ZOFRAN-ODT) ODT tab 4 mg    Or     ondansetron (ZOFRAN) injection 4 mg     glucose 40 % gel 15-30 g    Or     dextrose 50 % injection 25-50 mL    Or     glucagon injection 1 mg     acetaminophen (TYLENOL) tablet 650 mg       Caring Physician: Brayden Carrasco MD  [facility]  Pager Contact: see Physician sticky note

## 2017-11-08 NOTE — PLAN OF CARE
Problem: Patient Care Overview  Goal: Plan of Care/Patient Progress Review  Outcome: Improving  Patient alert and oriented. Negliable pain. Blood pressures remain elevated scheduled hydralazine given as ordered. 0200 . RLE with lymphedema wrap and elevated. Erythema decreasing from marked border on upper right leg. On IV antibiotics, saline locked in-between.  Assist x1 to bedside commode. Small BM. Voiding adequate amount of urine. Slept comfortably in between cares.

## 2017-11-09 ENCOUNTER — APPOINTMENT (OUTPATIENT)
Dept: OCCUPATIONAL THERAPY | Facility: CLINIC | Age: 67
DRG: 603 | End: 2017-11-09
Payer: MEDICARE

## 2017-11-09 LAB
ANION GAP SERPL CALCULATED.3IONS-SCNC: 7 MMOL/L (ref 3–14)
BASOPHILS # BLD AUTO: 0 10E9/L (ref 0–0.2)
BASOPHILS NFR BLD AUTO: 0.5 %
BUN SERPL-MCNC: 29 MG/DL (ref 7–30)
CALCIUM SERPL-MCNC: 8.3 MG/DL (ref 8.5–10.1)
CHLORIDE SERPL-SCNC: 112 MMOL/L (ref 94–109)
CO2 SERPL-SCNC: 25 MMOL/L (ref 20–32)
CREAT SERPL-MCNC: 3.3 MG/DL (ref 0.52–1.04)
CRP SERPL-MCNC: 12 MG/L (ref 0–8)
DIFFERENTIAL METHOD BLD: ABNORMAL
EOSINOPHIL # BLD AUTO: 0.2 10E9/L (ref 0–0.7)
EOSINOPHIL NFR BLD AUTO: 2.2 %
ERYTHROCYTE [DISTWIDTH] IN BLOOD BY AUTOMATED COUNT: 14.5 % (ref 10–15)
ERYTHROCYTE [SEDIMENTATION RATE] IN BLOOD BY WESTERGREN METHOD: >140 MM/H (ref 0–30)
GFR SERPL CREATININE-BSD FRML MDRD: 14 ML/MIN/1.7M2
GLUCOSE BLDC GLUCOMTR-MCNC: 133 MG/DL (ref 70–99)
GLUCOSE BLDC GLUCOMTR-MCNC: 152 MG/DL (ref 70–99)
GLUCOSE BLDC GLUCOMTR-MCNC: 191 MG/DL (ref 70–99)
GLUCOSE BLDC GLUCOMTR-MCNC: 192 MG/DL (ref 70–99)
GLUCOSE BLDC GLUCOMTR-MCNC: 337 MG/DL (ref 70–99)
GLUCOSE BLDC GLUCOMTR-MCNC: 98 MG/DL (ref 70–99)
GLUCOSE SERPL-MCNC: 108 MG/DL (ref 70–99)
HCT VFR BLD AUTO: 29.5 % (ref 35–47)
HGB BLD-MCNC: 9.3 G/DL (ref 11.7–15.7)
IMM GRANULOCYTES # BLD: 0 10E9/L (ref 0–0.4)
IMM GRANULOCYTES NFR BLD: 0.3 %
LYMPHOCYTES # BLD AUTO: 1.2 10E9/L (ref 0.8–5.3)
LYMPHOCYTES NFR BLD AUTO: 16.2 %
MAGNESIUM SERPL-MCNC: 1.9 MG/DL (ref 1.6–2.3)
MCH RBC QN AUTO: 27.7 PG (ref 26.5–33)
MCHC RBC AUTO-ENTMCNC: 31.5 G/DL (ref 31.5–36.5)
MCV RBC AUTO: 88 FL (ref 78–100)
MONOCYTES # BLD AUTO: 0.4 10E9/L (ref 0–1.3)
MONOCYTES NFR BLD AUTO: 5.5 %
NEUTROPHILS # BLD AUTO: 5.8 10E9/L (ref 1.6–8.3)
NEUTROPHILS NFR BLD AUTO: 75.3 %
NRBC # BLD AUTO: 0 10*3/UL
NRBC BLD AUTO-RTO: 0 /100
PHOSPHATE SERPL-MCNC: 5 MG/DL (ref 2.5–4.5)
PLATELET # BLD AUTO: 346 10E9/L (ref 150–450)
POTASSIUM SERPL-SCNC: 3.6 MMOL/L (ref 3.4–5.3)
RBC # BLD AUTO: 3.36 10E12/L (ref 3.8–5.2)
SODIUM SERPL-SCNC: 143 MMOL/L (ref 133–144)
WBC # BLD AUTO: 7.7 10E9/L (ref 4–11)

## 2017-11-09 PROCEDURE — A9270 NON-COVERED ITEM OR SERVICE: HCPCS | Mod: GY | Performed by: FAMILY MEDICINE

## 2017-11-09 PROCEDURE — 25000128 H RX IP 250 OP 636: Performed by: FAMILY MEDICINE

## 2017-11-09 PROCEDURE — 25000132 ZZH RX MED GY IP 250 OP 250 PS 637: Mod: GY | Performed by: FAMILY MEDICINE

## 2017-11-09 PROCEDURE — 85025 COMPLETE CBC W/AUTO DIFF WBC: CPT | Performed by: FAMILY MEDICINE

## 2017-11-09 PROCEDURE — 12000008 ZZH R&B INTERMEDIATE UMMC

## 2017-11-09 PROCEDURE — 36415 COLL VENOUS BLD VENIPUNCTURE: CPT | Performed by: FAMILY MEDICINE

## 2017-11-09 PROCEDURE — 00000146 ZZHCL STATISTIC GLUCOSE BY METER IP

## 2017-11-09 PROCEDURE — 40000133 ZZH STATISTIC OT WARD VISIT: Performed by: OCCUPATIONAL THERAPIST

## 2017-11-09 PROCEDURE — 84100 ASSAY OF PHOSPHORUS: CPT | Performed by: FAMILY MEDICINE

## 2017-11-09 PROCEDURE — 40000133 ZZH STATISTIC OT WARD VISIT

## 2017-11-09 PROCEDURE — 83735 ASSAY OF MAGNESIUM: CPT | Performed by: FAMILY MEDICINE

## 2017-11-09 PROCEDURE — 97110 THERAPEUTIC EXERCISES: CPT | Mod: GO

## 2017-11-09 PROCEDURE — 86140 C-REACTIVE PROTEIN: CPT | Performed by: FAMILY MEDICINE

## 2017-11-09 PROCEDURE — 80048 BASIC METABOLIC PNL TOTAL CA: CPT | Performed by: FAMILY MEDICINE

## 2017-11-09 PROCEDURE — 85652 RBC SED RATE AUTOMATED: CPT | Performed by: FAMILY MEDICINE

## 2017-11-09 PROCEDURE — 97140 MANUAL THERAPY 1/> REGIONS: CPT | Mod: GO | Performed by: OCCUPATIONAL THERAPIST

## 2017-11-09 RX ORDER — CEFTRIAXONE 1 G/1
1 INJECTION, POWDER, FOR SOLUTION INTRAMUSCULAR; INTRAVENOUS EVERY 24 HOURS
Status: DISCONTINUED | OUTPATIENT
Start: 2017-11-09 | End: 2017-11-13 | Stop reason: HOSPADM

## 2017-11-09 RX ORDER — HYDRALAZINE HYDROCHLORIDE 25 MG/1
100 TABLET, FILM COATED ORAL EVERY 8 HOURS
Status: DISCONTINUED | OUTPATIENT
Start: 2017-11-09 | End: 2017-11-13 | Stop reason: HOSPADM

## 2017-11-09 RX ADMIN — CARVEDILOL 25 MG: 6.25 TABLET, FILM COATED ORAL at 17:44

## 2017-11-09 RX ADMIN — Medication 1 CAPSULE: at 19:28

## 2017-11-09 RX ADMIN — DULOXETINE HYDROCHLORIDE 30 MG: 30 CAPSULE, DELAYED RELEASE ORAL at 09:44

## 2017-11-09 RX ADMIN — AMLODIPINE BESYLATE 10 MG: 10 TABLET ORAL at 09:44

## 2017-11-09 RX ADMIN — HEPARIN SODIUM 7500 UNITS: 10000 INJECTION, SOLUTION INTRAVENOUS; SUBCUTANEOUS at 09:52

## 2017-11-09 RX ADMIN — LISINOPRIL 40 MG: 40 TABLET ORAL at 09:44

## 2017-11-09 RX ADMIN — FUROSEMIDE 80 MG: 20 TABLET ORAL at 09:52

## 2017-11-09 RX ADMIN — INSULIN GLARGINE 20 UNITS: 100 INJECTION, SOLUTION SUBCUTANEOUS at 21:35

## 2017-11-09 RX ADMIN — MICONAZOLE NITRATE: 2 POWDER TOPICAL at 12:50

## 2017-11-09 RX ADMIN — HYDRALAZINE HYDROCHLORIDE 100 MG: 25 TABLET ORAL at 17:44

## 2017-11-09 RX ADMIN — Medication 1 CAPSULE: at 09:44

## 2017-11-09 RX ADMIN — HEPARIN SODIUM 7500 UNITS: 10000 INJECTION, SOLUTION INTRAVENOUS; SUBCUTANEOUS at 01:58

## 2017-11-09 RX ADMIN — VANCOMYCIN HYDROCHLORIDE 1500 MG: 10 INJECTION, POWDER, LYOPHILIZED, FOR SOLUTION INTRAVENOUS at 17:46

## 2017-11-09 RX ADMIN — HYDRALAZINE HYDROCHLORIDE 50 MG: 25 TABLET ORAL at 02:04

## 2017-11-09 RX ADMIN — HEPARIN SODIUM 7500 UNITS: 10000 INJECTION, SOLUTION INTRAVENOUS; SUBCUTANEOUS at 17:41

## 2017-11-09 RX ADMIN — CEFTRIAXONE 1 G: 1 INJECTION, POWDER, FOR SOLUTION INTRAMUSCULAR; INTRAVENOUS at 12:50

## 2017-11-09 RX ADMIN — DULOXETINE HYDROCHLORIDE 30 MG: 30 CAPSULE, DELAYED RELEASE ORAL at 19:28

## 2017-11-09 RX ADMIN — HYDRALAZINE HYDROCHLORIDE 50 MG: 25 TABLET ORAL at 09:43

## 2017-11-09 RX ADMIN — MICONAZOLE NITRATE: 2 POWDER TOPICAL at 19:26

## 2017-11-09 RX ADMIN — ACETAMINOPHEN 650 MG: 325 TABLET, FILM COATED ORAL at 19:28

## 2017-11-09 RX ADMIN — FUROSEMIDE 80 MG: 20 TABLET ORAL at 15:39

## 2017-11-09 RX ADMIN — ATORVASTATIN CALCIUM 20 MG: 20 TABLET, FILM COATED ORAL at 09:44

## 2017-11-09 RX ADMIN — ACETAMINOPHEN 650 MG: 325 TABLET, FILM COATED ORAL at 12:53

## 2017-11-09 RX ADMIN — CARVEDILOL 25 MG: 6.25 TABLET, FILM COATED ORAL at 09:43

## 2017-11-09 RX ADMIN — PIPERACILLIN AND TAZOBACTAM 2.25 G: 2; .25 INJECTION, POWDER, LYOPHILIZED, FOR SOLUTION INTRAVENOUS; PARENTERAL at 01:58

## 2017-11-09 ASSESSMENT — ENCOUNTER SYMPTOMS
LIGHT-HEADEDNESS: 0
NAUSEA: 0
MYALGIAS: 1
ABDOMINAL PAIN: 0
VOMITING: 0
DYSURIA: 0
CONSTIPATION: 0
FEVER: 0
SHORTNESS OF BREATH: 0
CHILLS: 0
HEADACHES: 0
COUGH: 0
WOUND: 1
DIARRHEA: 0

## 2017-11-09 ASSESSMENT — PAIN DESCRIPTION - DESCRIPTORS
DESCRIPTORS: ACHING

## 2017-11-09 NOTE — PLAN OF CARE
Problem: Patient Care Overview  Goal: Plan of Care/Patient Progress Review  PT 7C: PT orders received, per discussion with OT, pt non-ambulatory at baseline and is being followed by OT for wheelchair mobility and ADLs, no acute PT needs identified at this time. PT will defer and orders will be completed.

## 2017-11-09 NOTE — PLAN OF CARE
Problem: Patient Care Overview  Goal: Plan of Care/Patient Progress Review  Edema/7C: Pt with good tolerance of full leg wraps, donned and intact. Pt presenting with non-pitting in feet an distal LEs, proximal LEs with 2+ pitting into thighs. Skin intact with redness and firm pitting on lateral aspect of thigh, within drawn borders. Skin cares performed prior to reapplication of GCB with sween 24 from MTPs to knee creases for continued edema management and protection of skin integrity. Okay to wear x 48 hours until therapist returns. Please remove compression if causing numbness, tingling, pain, or becomes soiled.

## 2017-11-09 NOTE — PROGRESS NOTES
"CLINICAL NUTRITION SERVICES - ASSESSMENT NOTE     Nutrition Prescription    RECOMMENDATIONS FOR MDs/PROVIDERS TO ORDER:  -Consider ordering HgbA1C, has not had lab since June.    Malnutrition Status:    -Pt does not meet criteria    Recommendations already ordered by Registered Dietitian (RD):  -None at this time    Future/Additional Recommendations:  -Could consider cafeteria passes and/or supplements in future if patient's PO less than adequate.     REASON FOR ASSESSMENT  Supriya Herr is a/an 67 year old female assessed by the dietitian for LOS    NUTRITION HISTORY  Patient with good PO intake PTA. She is a type 2 diabetic but was not following a specific diet for this.     Pt admitted with lower extremity swelling. PMH: chronic kidney disease stage 3, congestive heart failure, diabetes, HTN, HLD, chronic lymphedema, JONE and morbid obesity    CURRENT NUTRITION ORDERS  Diet: Moderate Consistent Carbohydrate  Intake/Tolerance: per flowsheets patient eating 50-75% of meals     Per RN notes from yesterday (11/8) pt notes \"appetite is just not good\".    Pt reports eating 3 meals per day but feels a lot of the food is dry so she isn't eating 100% of meals. She still feels she is getting enough to eat and hopes to keep up eating less when she discharges.    LABS  Phos-5 (trending up)  HgbA1C (6/9/17): 9.6    MEDICATIONS  Lasix  Novolog  Lantus      ANTHROPOMETRICS  Height: 167 cm (5' 5.748\")  Most Recent Weight: 105.9 kg (233 lb 6.4 oz)    Admit/lowest weight: 102.4kg  IBW: 59.1 kg  BMI: Obesity Grade II BMI 35-39.9  Weight History:   Wt Readings from Last 10 Encounters:   11/08/17 105.9 kg (233 lb 6.4 oz)   09/21/17 101.6 kg (224 lb)   06/09/17 100.2 kg (220 lb 14.4 oz)   03/22/17 101.6 kg (224 lb)   02/14/17 103.4 kg (228 lb)   12/08/16 103.4 kg (228 lb)   11/21/16 103.8 kg (228 lb 14.4 oz)   11/09/16 103.4 kg (228 lb)   10/26/16 101.1 kg (222 lb 12.8 oz)   08/25/16 100.9 kg (222 lb 6.4 oz)   Dosing Weight: " 70kg    ASSESSED NUTRITION NEEDS  Estimated Energy Needs: 0510-4818 kcals/day (20 - 25 kcals/kg)  Justification: Obese  Estimated Protein Needs:  grams protein/day (1.2 - 1.5 grams of pro/kg)  Justification: Hypercatabolism with acute illness  Estimated Fluid Needs: 1 mL/kcal  Justification: Maintenance    PHYSICAL FINDINGS  Per WOC note (11/08): PI now healed    MALNUTRITION  % Intake: Decreased intake does not meet criteria  % Weight Loss: None noted  Subcutaneous Fat Loss: None observed  Muscle Loss: None observed  Fluid Accumulation/Edema: Mild/moderate  Malnutrition Diagnosis: Patient does not meet two of the above criteria necessary for diagnosing malnutrition    NUTRITION DIAGNOSIS  Predicted inadequate nutrient intake related to pt report of eating less secondary to food being dry    INTERVENTIONS  Implementation  Nutrition Education: Provided education on food options. Discussed importance of getting adequate nutrition, pt believes she is eating enough and declined supplements at this time. Reviewed importance of healthy diet/BS control at home.     Goals  Patient to consume % of nutritionally adequate meal trays TID, or the equivalent with supplements/snacks.     Monitoring/Evaluation  Progress toward goals will be monitored and evaluated per protocol.    Ruth Tucker RD, LD  Pager: [phone]

## 2017-11-09 NOTE — PROGRESS NOTES
[facility]      Date of admission: 11/1/2017  Date of service: 11/9/2017       Assessment and Plan:   Assessment:   Supriya Herr is a 67 year old female with a significant past medical history of chronic kidney disease stage 3, congestive heart failure, uncontrolled diabetes type-2, HTN, HLD, chronic lymphedema, JONE and morbid obesity admitted for RLE cellulitis which is was very slow to improve on IV antibiotics  Patient Active Problem List   Diagnosis     Vitiligo     Traumatic amputation of leg above knee (H)     Contact dermatitis and other eczema, due to unspecified cause     Dermatophytosis of nail     Generalized osteoarthrosis, unspecified site     Hypertension goal BP (blood pressure) < 140/90     Mild nonproliferative diabetic retinopathy (H)     Proteinuria     Stage I pressure ulcer     Hyperlipidemia LDL goal <100     Non compliance with medical treatment     Tobacco abuse     Advanced directives, counseling/discussion     Incontinence of urine     Basal cell carcinoma     Senile nuclear sclerosis     JONE (obstructive sleep apnea)     CHF (congestive heart failure) (H)     Health Care Home     CKD (chronic kidney disease) stage 3, GFR 30-59 ml/min     Type 2 diabetes, HbA1C goal < 8% (H)     Type 2 diabetes mellitus with diabetic chronic kidney disease (H)     Morbid obesity (H)     Type 2 diabetes mellitus with stage 3 chronic kidney disease, without long-term current use of insulin (H)     Moderate recurrent major depression (H)     Type 2 diabetes mellitus with diabetic neuropathy, with long-term current use of insulin (H)     Cellulitis      Plan:    ## Right thigh Cellulitis: Stable  ## Chronic lymphedema: Worsening  Swelling, pain, redness improving last 24 hours.  Lymphedema wraps helping with swelling.    - Lymphedema wrap of right thigh/leg  - Patient euvoleumi, will contiue PTA diuretics  - ID on board, Continue vancomycin, start  ceftriaxone, discontinue zosyn  - Miconazole powder to right groin  - Elevate RLE as tolerated    ## CKD G4-5  Baseline creatinine 1.8-2.1 from earlier this year, but graphing 1/creatinine vs. Time shows progressive decline of renal function which has been progressing faster over last 12 months. Current GFR (13-16) likely new baseline.  - Continue lisinopril 40mg daily which is renal dosing for creatinine clearance 10-30 (PTA dose 80mg daily)  - Continue tolterodine 2mg for overactive bladder  - Avoid nephrotoxic meds when possible, renally dose medications  - Continue home lasix 80mg BID  - strict I/O's and daily weights       ## Diabetes- type 2: Uncontrolled    Uncontrolled diabetes with last Hgb A1c 9.2 (9/13/17).  Low fasting glucoses this admission, likely related to both medication adherence while hospitalized, improved diet while hospitalized, and poor appetite. No hypoglycemia last 24 hours  - Home regimen: Lantus 70u at bedtime, Novolog 6U with meals and sliding scale  - Hospital regimen: Lantus 20u at bedtime and LSSI to estimate prandial insulin needs  - Hypoglycemia protocol  - Moderate consistent carbohydrate diet     ## HTN: uncontrolled  ## Hyperlipidemia  - Continue PTA carvedilol 25mg BID  - Continue lisinopril (40 mg daily rather than PTA dose of 80mg daily)  - Increase hydralazine from 50mg to 100mg PO q8h  - Continue PTA amlodipine 10mg daily  - Continue furosemide 80mg BID  - Continue PTA Lipitor 20 mg daily      ##HFpEF  Last echocardiogram 12/2013 with normal LV function but technically difficulty study due to patient's obesity.  HFpEF documented in chart, but no impaired diastolic filling on echo  - Continue carvedilol 25mg BID  - consider repeat echocardiogram as outpatient     ## Normocytic Anemia- Stable  Hgb is stable with baseline ~9-10. She has a history of iron deficiency anemia.   - Consider iron studies and iron supplementation as outpatient      ## Neuropathy  - continue cymbalta  30mg BID     ##HLD  - Continue lipitor 20mg daily     ## JONE  - Refuses use of CPAP since she is unable to tolerate      ##Stage 1 pressure ulcer on coccyx   - WOC consulted, mepilex dressing to coccyx (patient intermittently declines this dressing)     ## Physical deconditioning  - PT/OT consulted, encourage OOB     Fluids: PO  Electrolytes:WNL, will monitor  Nutrition: Moderate carb control diet  Lines:PIV  Activity: -Up as tolerated, PT, OT  CODE: Full Code  Prophylaxis: Heparin SQ  PCP communication:  - Noam Jackson  - Contacted at admission: No  - Concerns or updates: N/A  Dispo: Expected discharge date 1-2 days pending clinical improvement              Interval History:   Patient says that redness is much better.  Pain somewhat improved.  Swelling is down esepcially where lymphedema wrap was on lower right thigh.  No fevers or chills.  No drainage.              Review of Systems:   Review of Systems   Constitutional: Negative for chills and fever.   Eyes: Negative for visual disturbance.   Respiratory: Negative for cough and shortness of breath.    Cardiovascular: Positive for leg swelling. Negative for chest pain.   Gastrointestinal: Negative for abdominal pain, constipation, diarrhea, nausea and vomiting.   Genitourinary: Negative for dysuria.   Musculoskeletal: Positive for myalgias.   Skin: Positive for rash and wound.   Neurological: Negative for light-headedness and headaches.          Physical Exam (Resident / Clinician):     Vitals were reviewed  Patient Vitals for the past 24 hrs:   BP Temp Temp src Pulse Heart Rate Resp SpO2 Weight   11/09/17 1258 - - - - - - - 100.7 kg (222 lb 1.6 oz)   11/09/17 1215 150/54 98  F (36.7  C) Oral - 76 18 95 % -   11/09/17 0946 171/59 - - - 88 - - -   11/09/17 0747 168/58 98.1  F (36.7  C) Oral - 84 18 94 % -   11/09/17 0421 169/51 - - - - - - -   11/09/17 0203 182/58 - - 81 - - - -   11/08/17 2200 163/53 97.6  F (36.4  C) Oral 80 - 20 93 % -   11/08/17  2011 135/50 98.3  F (36.8  C) Oral 79 - 20 94 % -   11/08/17 1615 158/56 - - - - - - -   11/08/17 1610 184/66 98.1  F (36.7  C) Oral - 79 18 96 % -       Physical Exam   Constitutional: She is oriented to person, place, and time. She appears well-developed and well-nourished. No distress.   HENT:   Head: Normocephalic and atraumatic.   Eyes: Conjunctivae are normal.   Neck: Neck supple.   Cardiovascular: Normal rate and regular rhythm.    Pulmonary/Chest: Effort normal and breath sounds normal. No respiratory distress.   Abdominal: Soft. Bowel sounds are normal. There is no tenderness. There is no rebound and no guarding.   Musculoskeletal: She exhibits no edema (1+ pitting edema RLE, significant swelling right thigh (improved)).   Right thigh erythema improved from prior exams. Slightly improved margins and lighter hue of pink from prior exams.  Palpable edema with induration along lateral right thigh also somewhat improved, especially on lower thigh where lymphedema wrap was located, no fluctuance or expressible drainage.   Neurological: She is alert and oriented to person, place, and time.   Skin: Skin is warm and dry. She is not diaphoretic.   Psychiatric: She has a normal mood and affect.             Data:   ROUTINE LABS (Last four results)  CMP    Recent Labs  Lab 11/09/17  0811 11/08/17  0810 11/07/17  0825 11/06/17  1654 11/06/17  0717    147* 142 144 143   POTASSIUM 3.6 3.6 3.6 4.0 3.7   CHLORIDE 112* 113* 112* 113* 114*   CO2 25 24 22 24 20   ANIONGAP 7 10 9 7 9   * 65* 50* 171* 72   BUN 29 28 29 33* 31*   CR 3.30* 3.12* 3.11* 3.14* 3.14*   GFRESTIMATED 14* 15* 15* 15* 15*   GFRESTBLACK 17* 18* 18* 18* 18*   JOYD 8.3* 8.5 8.8 8.2* 8.6   MAG 1.9 2.0 2.0  --  2.0   PHOS 5.0* 4.6* 4.7*  --  4.7*   PROTTOTAL  --   --  7.4  --   --    ALBUMIN  --   --  1.7*  --   --    BILITOTAL  --   --  0.3  --   --    ALKPHOS  --   --  75  --   --    AST  --   --  14  --   --    ALT  --   --  18  --   --       CBC    Recent Labs  Lab 11/09/17 0811 11/08/17 0810 11/07/17 0825 11/06/17 0717   WBC 7.7 7.9 10.0 8.1   RBC 3.36* 3.37* 3.42* 3.14*   HGB 9.3* 9.3* 9.5* 8.7*   HCT 29.5* 29.7* 30.2* 27.5*   MCV 88 88 88 88   MCH 27.7 27.6 27.8 27.7   MCHC 31.5 31.3* 31.5 31.6   RDW 14.5 14.4 14.4 14.5    315 364 333     INRNo lab results found in last 7 days.  CRP    Recent Labs  Lab 11/09/17 0811 11/08/17 0810 11/07/17 0825 11/06/17 0717   CRP 12.0* 15.0* 23.0* 31.0*         Blood culture:  Invalid input(s): BC   Urine culture:  No results for input(s): URC in the last 168 hours.  All cultures:  No results for input(s): CULT in the last 168 hours.        Medications:     Current Facility-Administered Medications   Medication     cefTRIAXone (ROCEPHIN) 1 g vial to attach to  mL bag for ADULTS or NS 50 mL bag for PEDS     hydrALAZINE (APRESOLINE) tablet 100 mg     furosemide (LASIX) tablet 80 mg     insulin glargine (LANTUS) injection 20 Units     lisinopril (PRINIVIL/ZESTRIL) tablet 40 mg     insulin aspart (NovoLOG) inj (RAPID ACTING)     insulin aspart (NovoLOG) inj (RAPID ACTING)     amLODIPine (NORVASC) tablet 10 mg     vancomycin (VANCOCIN) 1,500 mg in NaCl 0.9 % 250 mL intermittent infusion     benzocaine-menthol (CEPACOL) 15-3.6 MG lozenge 1 lozenge     hydrALAZINE (APRESOLINE) injection 10 mg     loratadine (CLARITIN) tablet 10 mg     miconazole (MICATIN; MICRO GUARD) 2 % powder     lactobacillus rhamnosus (GG) (CULTURELL) capsule 1 capsule     heparin injection 7,500 Units     albuterol (PROAIR HFA/PROVENTIL HFA/VENTOLIN HFA) Inhaler 2 puff     atorvastatin (LIPITOR) tablet 20 mg     carvedilol (COREG) tablet 25 mg     DULoxetine (CYMBALTA) EC capsule 30 mg     naloxone (NARCAN) injection 0.1-0.4 mg     polyethylene glycol (MIRALAX/GLYCOLAX) Packet 17 g     ondansetron (ZOFRAN-ODT) ODT tab 4 mg    Or     ondansetron (ZOFRAN) injection 4 mg     glucose 40 % gel 15-30 g    Or     dextrose 50 %  injection 25-50 mL    Or     glucagon injection 1 mg     acetaminophen (TYLENOL) tablet 650 mg       Caring Physician: Brayden Carrasco MD  [facility]  Pager Contact: see Physician sticky note

## 2017-11-09 NOTE — PLAN OF CARE
Problem: Patient Care Overview  Goal: Plan of Care/Patient Progress Review  Outcome: Therapy, progress toward functional goals is gradual  Right lateral thigh redness marked, no change overnight. RLE edematous, lymphedema wrap in place. On IV abx. +bowel sounds, passing flatus. BM x1. Denies pain and nausea. Diabetic diet, BG stable overnight; decreased Lantus to 20 units HS. Voiding spontaneously adequate amounts. R below knee amputation, personal wheelchair in room. Up with assist x1 to pivot to [facility]. Hypertensive, BP max 182/58. On scheduled PO Hydralazine, recheck after administration 169/51; did not meet parameters for PRN IV Hydralazine. Other VSS on room air.

## 2017-11-09 NOTE — PLAN OF CARE
Problem: Patient Care Overview  Goal: Plan of Care/Patient Progress Review  Discharge Planner OT   Patient plan for discharge: home with assist from daughter (she is pts PCA).  Current status: Pt sitting upright in bed upon arrival, c/o increased pain and fatigue this session but agreeable to in bed activity. To promote increased strength needed for ADLs and functional transfers, Th has pt perform BUE strengthening exercises with 3lb weights. Pt performed shoulder flex/ext, add/abduction, forward punch outs, elbow flex, elbow ext, and int/ext rotation, 10 reps of each. Pt took rest breaks in between to recover from fatigue and increased SOB, Th talked through PLB techniques. Pt also performed straight leg lifts and knee bends with RLE, 10 reps of each.   Barriers to return to prior living situation: general deconditioning, fatigue, medical status   Recommendations for discharge: home with assist from daughter.  Rationale for recommendations: Pt is very close if not at baseline for functional mobility, which is why PT would not be appropriate during IP stay. Pt will benefit from assist with heavier ADLs/IADLs at home to ensure safety with task completion.        Entered by: Hyacinth Colon 11/09/2017 4:34 PM

## 2017-11-09 NOTE — CONSULTS
"Diabetes Education  Received consult request to see this 67 year old female for diabetes education due to non-adherence and worsening kidney function.  Patient  admitted with cellulitis right leg.  She has a history of type 2 diabetes.  A1c 9.2% 9/13/17.      Met with patient to assess learning needs.  Patient states \" My diabetes is better controlled here than at home, and the insulin is the same.\"  Discussed what she thought might be different, and she identified that food and snacks not readily available in the hospital, unlike at home.  She states she takes her insulin as prescribed, does not forget or omit doses.  She states her motivation to keep her diabetes managed is her 4 year old grandson.  She also states she wants to manage her diabetes in order to prevent dialysis.  She is also pleased that her cellulitis seems to be improving.    She has been working with the outpatient RN CDE and recently started mealtime insulin.  Encouraged patient to continue to work with outpatient CDE for support and to improve her glucose management.    Ramila Rodas MS RN CDE [facility]  [phone]    "

## 2017-11-09 NOTE — PROVIDER NOTIFICATION
[facility] overnight resident at 0219. Pt's /58. Gave scheduled 50 mg PO Hydralazine. Will recheck BP in about an hour.     MD did not return page.

## 2017-11-09 NOTE — PLAN OF CARE
"Problem: Patient Care Overview  Goal: Plan of Care/Patient Progress Review  Outcome: Improving  VSS. BP max 171/59. Scheduled BP meds given, next BP check 150/54. BG checks stable. Patient tolerating diet ok, family is encouraging high protein \"healthy\" foods. IV antibiotics given as ordered. Redness to right upper leg improving, unchanged throughout the day. Patient showered, lymphedema changed wraps. Passing gas, had BM today. Voiding adequately. Declined am scheduled Tylenol, took this afternoon for headache.      Patient's sister at bedside, updated on POC. When patient's daughter arrives this evening she would like an update from MD team.       "

## 2017-11-10 ENCOUNTER — APPOINTMENT (OUTPATIENT)
Dept: OCCUPATIONAL THERAPY | Facility: CLINIC | Age: 67
DRG: 603 | End: 2017-11-10
Payer: MEDICARE

## 2017-11-10 LAB
ANION GAP SERPL CALCULATED.3IONS-SCNC: 7 MMOL/L (ref 3–14)
BUN SERPL-MCNC: 30 MG/DL (ref 7–30)
CALCIUM SERPL-MCNC: 8 MG/DL (ref 8.5–10.1)
CHLORIDE SERPL-SCNC: 110 MMOL/L (ref 94–109)
CO2 SERPL-SCNC: 26 MMOL/L (ref 20–32)
CREAT SERPL-MCNC: 3.21 MG/DL (ref 0.52–1.04)
GFR SERPL CREATININE-BSD FRML MDRD: 14 ML/MIN/1.7M2
GLUCOSE BLDC GLUCOMTR-MCNC: 120 MG/DL (ref 70–99)
GLUCOSE BLDC GLUCOMTR-MCNC: 151 MG/DL (ref 70–99)
GLUCOSE BLDC GLUCOMTR-MCNC: 154 MG/DL (ref 70–99)
GLUCOSE BLDC GLUCOMTR-MCNC: 158 MG/DL (ref 70–99)
GLUCOSE BLDC GLUCOMTR-MCNC: 252 MG/DL (ref 70–99)
GLUCOSE SERPL-MCNC: 200 MG/DL (ref 70–99)
MAGNESIUM SERPL-MCNC: 1.8 MG/DL (ref 1.6–2.3)
PHOSPHATE SERPL-MCNC: 4.7 MG/DL (ref 2.5–4.5)
PLATELET # BLD AUTO: 320 10E9/L (ref 150–450)
POTASSIUM SERPL-SCNC: 3.2 MMOL/L (ref 3.4–5.3)
SODIUM SERPL-SCNC: 143 MMOL/L (ref 133–144)

## 2017-11-10 PROCEDURE — 25000128 H RX IP 250 OP 636: Performed by: FAMILY MEDICINE

## 2017-11-10 PROCEDURE — 36415 COLL VENOUS BLD VENIPUNCTURE: CPT | Performed by: FAMILY MEDICINE

## 2017-11-10 PROCEDURE — 85049 AUTOMATED PLATELET COUNT: CPT | Performed by: FAMILY MEDICINE

## 2017-11-10 PROCEDURE — 84100 ASSAY OF PHOSPHORUS: CPT | Performed by: FAMILY MEDICINE

## 2017-11-10 PROCEDURE — 40000133 ZZH STATISTIC OT WARD VISIT

## 2017-11-10 PROCEDURE — A9270 NON-COVERED ITEM OR SERVICE: HCPCS | Mod: GY | Performed by: FAMILY MEDICINE

## 2017-11-10 PROCEDURE — 97140 MANUAL THERAPY 1/> REGIONS: CPT | Mod: GO

## 2017-11-10 PROCEDURE — 25000132 ZZH RX MED GY IP 250 OP 250 PS 637: Mod: GY | Performed by: FAMILY MEDICINE

## 2017-11-10 PROCEDURE — 80048 BASIC METABOLIC PNL TOTAL CA: CPT | Performed by: FAMILY MEDICINE

## 2017-11-10 PROCEDURE — 12000001 ZZH R&B MED SURG/OB UMMC

## 2017-11-10 PROCEDURE — 00000146 ZZHCL STATISTIC GLUCOSE BY METER IP

## 2017-11-10 PROCEDURE — 25000125 ZZHC RX 250: Performed by: FAMILY MEDICINE

## 2017-11-10 PROCEDURE — 83735 ASSAY OF MAGNESIUM: CPT | Performed by: FAMILY MEDICINE

## 2017-11-10 RX ORDER — POTASSIUM CHLORIDE 750 MG/1
40 TABLET, EXTENDED RELEASE ORAL EVERY 6 HOURS
Status: COMPLETED | OUTPATIENT
Start: 2017-11-10 | End: 2017-11-10

## 2017-11-10 RX ADMIN — CARVEDILOL 25 MG: 6.25 TABLET, FILM COATED ORAL at 17:58

## 2017-11-10 RX ADMIN — HYDRALAZINE HYDROCHLORIDE 100 MG: 25 TABLET ORAL at 09:03

## 2017-11-10 RX ADMIN — HEPARIN SODIUM 7500 UNITS: 10000 INJECTION, SOLUTION INTRAVENOUS; SUBCUTANEOUS at 02:05

## 2017-11-10 RX ADMIN — FUROSEMIDE 80 MG: 20 TABLET ORAL at 15:38

## 2017-11-10 RX ADMIN — MICONAZOLE NITRATE: 2 POWDER TOPICAL at 20:03

## 2017-11-10 RX ADMIN — ONDANSETRON 4 MG: 4 TABLET, ORALLY DISINTEGRATING ORAL at 15:38

## 2017-11-10 RX ADMIN — DULOXETINE HYDROCHLORIDE 30 MG: 30 CAPSULE, DELAYED RELEASE ORAL at 08:57

## 2017-11-10 RX ADMIN — ATORVASTATIN CALCIUM 20 MG: 20 TABLET, FILM COATED ORAL at 08:56

## 2017-11-10 RX ADMIN — POTASSIUM CHLORIDE 40 MEQ: 750 TABLET, EXTENDED RELEASE ORAL at 14:09

## 2017-11-10 RX ADMIN — AMLODIPINE BESYLATE 10 MG: 10 TABLET ORAL at 08:57

## 2017-11-10 RX ADMIN — Medication 1 CAPSULE: at 08:57

## 2017-11-10 RX ADMIN — FUROSEMIDE 80 MG: 20 TABLET ORAL at 08:56

## 2017-11-10 RX ADMIN — HYDRALAZINE HYDROCHLORIDE 100 MG: 25 TABLET ORAL at 02:05

## 2017-11-10 RX ADMIN — POTASSIUM CHLORIDE 40 MEQ: 750 TABLET, EXTENDED RELEASE ORAL at 17:52

## 2017-11-10 RX ADMIN — HEPARIN SODIUM 7500 UNITS: 10000 INJECTION, SOLUTION INTRAVENOUS; SUBCUTANEOUS at 17:53

## 2017-11-10 RX ADMIN — ACETAMINOPHEN 650 MG: 325 TABLET, FILM COATED ORAL at 08:57

## 2017-11-10 RX ADMIN — LISINOPRIL 40 MG: 40 TABLET ORAL at 08:57

## 2017-11-10 RX ADMIN — CEFTRIAXONE 1 G: 1 INJECTION, POWDER, FOR SOLUTION INTRAMUSCULAR; INTRAVENOUS at 12:33

## 2017-11-10 RX ADMIN — HYDRALAZINE HYDROCHLORIDE 100 MG: 25 TABLET ORAL at 17:59

## 2017-11-10 RX ADMIN — DULOXETINE HYDROCHLORIDE 30 MG: 30 CAPSULE, DELAYED RELEASE ORAL at 20:02

## 2017-11-10 RX ADMIN — ACETAMINOPHEN 650 MG: 325 TABLET, FILM COATED ORAL at 02:08

## 2017-11-10 RX ADMIN — Medication 1 CAPSULE: at 20:02

## 2017-11-10 RX ADMIN — MICONAZOLE NITRATE: 2 POWDER TOPICAL at 08:56

## 2017-11-10 RX ADMIN — CARVEDILOL 25 MG: 6.25 TABLET, FILM COATED ORAL at 08:56

## 2017-11-10 RX ADMIN — HEPARIN SODIUM 7500 UNITS: 10000 INJECTION, SOLUTION INTRAVENOUS; SUBCUTANEOUS at 08:56

## 2017-11-10 ASSESSMENT — ENCOUNTER SYMPTOMS
MYALGIAS: 1
FEVER: 0
CONSTIPATION: 0
LIGHT-HEADEDNESS: 0
DIARRHEA: 0
HEADACHES: 0
NAUSEA: 0
ABDOMINAL PAIN: 0
WOUND: 1
VOMITING: 0
COUGH: 0
DYSURIA: 0
CHILLS: 0
SHORTNESS OF BREATH: 0

## 2017-11-10 ASSESSMENT — PAIN DESCRIPTION - DESCRIPTORS
DESCRIPTORS: ACHING;SHARP
DESCRIPTORS: HEADACHE

## 2017-11-10 NOTE — PROGRESS NOTES
[facility] ID SERVICE: ONGOING CONSULTATION   Supriya Herr : 1950 Sex: female:   Medical record number [id number] Attending Physician: Demetria Juan DO  Date of Service: November 10, 2017    PROBLEM LIST:   #Moderate to severe RLE Cellulitis - improving  #Intertrigo  #Lymphedema  #Obesity  #T2DM  #CKD  #HFpEF, HTN, HLD  #Stage I Pressure Ulcer    RECOMMENDATIONS:   - Continue Vancomycin (goal trough 13-18 mg/L) and Ceftriaxone 1g IV q24  - Miconazole powder to inguinal region and pannus    DISCUSSION:   Ms. Herr is a 68yo woman with multiple medical problems including chronic lymphedema of the right leg, obesity and diabetes who presented with progressive erythema of the leg/thigh, fevers and chills. The area of erythema progressed and CT imaging showed a deep fluid collection with worsening cellulitis. Despite progression on imaging, WBC and CRP downtrended and her exam was not suggestive of necrotizing fasciitis. Given the severity of her infection, she has required ongoing IV therapy with higher Vancomycin troughs (13-18 mg/L). Her leg continues to improve, albeit slowly. Would continue Ceftriaxone for gram negative coverage given the location of her cellulitis and intertrigo. Would continue to monitor in hospital on IV abx until significant improvement is demonstrated, will likely need 14 days of IV therapy.    Above discussed with Infectious Diseases Staff, Dr. Amy Sandoval.  ID will continue to follow.      Karlene Mayers D.O.  [facility] ID Fellow  [phone]       CHIEF INFECTIOUS DISEASES COMPLAINT:  Non-responding cellulitis    INTERVAL HISTORY:   Tolerated abx change. Denies fevers, chills, fatigue. Leg continues to slowly improve and is less tender.    ROS: A five-point review of systems was obtained and was negative with the exception of that which is described above.  Allergies   Allergen Reactions     Nkda [No Known Drug Allergies]      Allergies were reviewed.    CURRENT  "ANTI-INFECTIVES:   Clindamycin 11/4-11/5  Piperacillin-Tazobactam 11/1-11/9  Vancomycin 11/1-present  Cefriaxone 11/9-present    EXAMINATION:   /72 (BP Location: Right arm)  Pulse 83  Temp 97.1  F (36.2  C) (Oral)  Resp 16  Ht 1.67 m (5' 5.75\")  Wt 100.7 kg (222 lb 1.6 oz)  SpO2 95%  BMI 36.12 kg/m2  GENERAL:  Well-developed, well-nourished, laying in bed in no acute distress.  EYES:  Eyes have anicteric sclerae without conjunctival injection.  ENT:  Oropharynx is moist without exudates or ulcers.  NECK:  Supple. No cervical lymphadenopathy.  LUNGS:  Normal respiratory effort. Lung fields with mild bibasilar crackles - improved from yesterday.  CARDIOVASCULAR:  Regular rate and rhythm with no murmurs, gallops or rubs.  ABDOMEN:  Normal bowel sounds, soft, nontender. No appreciable hepatosplenomegaly.  SKIN:  RLE wrapped with ACE wrap and not examined today, erythema lateral to the knee improving from yesterday, the erythematous area over the lateral thigh extending towards the hip is less red, less painful to palpation and receeding from the margins, no crepitus, severe pain on palpation, or fluctance. Intertrigo under panus.   EXT: L AKA  NEUROLOGIC:  Grossly nonfocal. Active x4 extremities.  PSYCH: Appropriate affect. Alert and oriented to person, place and time.  CURRENT LINES: R forearm PIVs x2    NEW DATA/RESULTS:   All interval basic labs, microbiology results and imaging were reviewed.    Culture Micro   Date Value Ref Range Status   11/02/2017 No growth  Final   11/01/2017 No growth  Final   11/01/2017 No growth  Final   10/26/2016 (A)  Final    50,000 to 100,000 colonies/mL Escherichia coli  10,000 to 50,000 colonies/mL Beta hemolytic Streptococcus group B Beta Hemolytic   Streptococcus groups A and B are susceptible to ampicillin, penicillin,   vancomycin, and the cephalosporins.  Susceptibility testing is not routinely   done on these organisms isolated from urine.     06/11/2015 (A)  Final "    >100,000 colonies/mL Escherichia coli  10,000 to 50,000 colonies/mL mixed urogenital shree Susceptibility testing not   routinely done         Recent Labs   Lab Test  11/09/17   0811  11/08/17   0810  11/07/17   0825  11/06/17   0717  11/05/17   0903  11/04/17   0810   CRP  12.0*  15.0*  23.0*  31.0*  50.0*  96.0*     Recent Labs   Lab Test  11/09/17   0811  11/08/17   0810  11/07/17   0825  11/06/17   0717  11/05/17   0903  11/04/17   0810   WBC  7.7  7.9  10.0  8.1  6.1  7.4     Recent Labs   Lab Test  11/10/17   1030  11/09/17   0811  11/08/17   0810  11/07/17   0825   CR  3.21*  3.30*  3.12*  3.11*   GFRESTIMATED  14*  14*  15*  15*       Hematology Studies  Recent Labs   Lab Test  11/10/17   0701  11/09/17   0811  11/08/17   0810  11/07/17   0825  11/06/17   0717  11/05/17   0903  11/04/17   0810   WBC   --   7.7  7.9  10.0  8.1  6.1  7.4   ANEU   --   5.8  5.7  7.1  5.6  4.3  5.4   AEOS   --   0.2  0.2  0.2  0.2  0.2  0.2   HCT   --   29.5*  29.7*  30.2*  27.5*  23.0*  24.6*   PLT  320  346  315  364  333  280  310       Metabolic  Recent Labs   Lab Test  11/10/17   1030  11/09/17   0811  11/08/17   0810   NA  143  143  147*   BUN  30  29  28   CO2  26  25  24   CR  3.21*  3.30*  3.12*   GFRESTIMATED  14*  14*  15*       Hepatic Studies  Recent Labs   Lab Test  11/07/17   0825  11/01/17   1414  09/21/17   1439   BILITOTAL  0.3  0.3  0.2   ALKPHOS  75  79  71   ALBUMIN  1.7*  1.9*  2.5*   AST  14  7  12   ALT  18  15  17       Immunologlobulins  Recent Labs   Lab Test  11/09/17   0811  11/07/17   0825  11/06/17   0717   03/22/17   0803   IGG   --    --    --    --   1090   IGM   --    --    --    --   147   IGA   --    --    --    --   368   SED  >140*  109*  >140*   < >   --     < > = values in this interval not displayed.

## 2017-11-10 NOTE — PROGRESS NOTES
[facility] ID SERVICE: ONGOING CONSULTATION   Supriya Herr : 1950 Sex: female:   Medical record number [id number] Attending Physician: Demetria Juan DO  Date of Service: 2017    PROBLEM LIST:   #Moderate to severe RLE Cellulitis - improving  #Intertrigo  #Lymphedema  #Obesity  #T2DM  #CKD  #HFpEF, HTN, HLD  #Stage I Pressure Ulcer    RECOMMENDATIONS:   - Continue Vancomycin (goal trough 13-18 mg/L)  - Discontinue Piperacillin-Tazobactam  - Start Ceftriaxone 1g IV q24  - Miconazole powder to inguinal region and pannus    DISCUSSION:   Ms. Herr is a 68yo woman with multiple medical problems including chronic lymphedema of the right leg, obesity and diabetes who presented with progressive erythema of the leg/thigh, fevers and chills. The area of erythema progressed and CT imaging showed a deep fluid collection with worsening cellulitis. Despite progression on imaging, WBC and CRP downtrended and her exam was not suggestive of necrotizing fasciitis. Given the severity of her infection, she has required ongoing IV therapy with higher Vancomycin troughs (13-18 mg/L). Her leg looks much better today, and can narrow from Piperacillin-Tazobactam to Ceftriaxone, but would want to keep gram negative coverage given the location of her cellulitis and intertrigo. Would continue to monitor in hospital on IV abx until significant improvement is demonstrated.    Above discussed with Infectious Diseases Staff, Dr. Amy Sandoval.  ID will continue to follow.      Karlene Mayers D.O.  [facility] ID Fellow  [phone]       CHIEF INFECTIOUS DISEASES COMPLAINT:  Non-responding cellulitis    INTERVAL HISTORY:   Leg continues to improve. Less red and painful today. No fevers, chills, fatigue, sweats.    ROS: A five-point review of systems was obtained and was negative with the exception of that which is described above.  Allergies   Allergen Reactions     Nkda [No Known Drug Allergies]      Allergies  "were reviewed.    CURRENT ANTI-INFECTIVES:   Clindamycin 11/4-11/5  Vancomycin 11/1-present  Piperacillin-Tazobactam 11/1-present    EXAMINATION:   /51 (BP Location: Right arm)  Pulse 83  Temp 98  F (36.7  C) (Oral)  Resp 18  Ht 1.67 m (5' 5.75\")  Wt 100.7 kg (222 lb 1.6 oz)  SpO2 93%  BMI 36.12 kg/m2  GENERAL:  Well-developed, well-nourished, laying in bed in no acute distress.  EYES:  Eyes have anicteric sclerae without conjunctival injection.  ENT:  Oropharynx is moist without exudates or ulcers.  NECK:  Supple. No cervical lymphadenopathy.  LUNGS:  Normal respiratory effort. Lung fields with mild bibasilar crackles - improved from yesterday.  CARDIOVASCULAR:  Regular rate and rhythm with no murmurs, gallops or rubs.  ABDOMEN:  Normal bowel sounds, soft, nontender. No appreciable hepatosplenomegaly.  SKIN:  RLE wrapped with ACE wrap and not examined today, erythema lateral to the knee improving from yesterday, the erythematous area over the lateral thigh extending towards the hip is less red, less painful to palpation and receeding from the margins, no crepitus, severe pain on palpation, or fluctance. Intertrigo under panus.   EXT: L AKA  NEUROLOGIC:  Grossly nonfocal. Active x4 extremities.  PSYCH: Appropriate affect. Alert and oriented to person, place and time.  CURRENT LINES: R forearm PIVs x2    NEW DATA/RESULTS:   All interval basic labs, microbiology results and imaging were reviewed.    Culture Micro   Date Value Ref Range Status   11/02/2017 No growth  Final   11/01/2017 No growth  Final   11/01/2017 No growth  Final   10/26/2016 (A)  Final    50,000 to 100,000 colonies/mL Escherichia coli  10,000 to 50,000 colonies/mL Beta hemolytic Streptococcus group B Beta Hemolytic   Streptococcus groups A and B are susceptible to ampicillin, penicillin,   vancomycin, and the cephalosporins.  Susceptibility testing is not routinely   done on these organisms isolated from urine.     06/11/2015 (A)  Final "    >100,000 colonies/mL Escherichia coli  10,000 to 50,000 colonies/mL mixed urogenital shree Susceptibility testing not   routinely done         Recent Labs   Lab Test  11/09/17   0811  11/08/17   0810  11/07/17   0825  11/06/17   0717  11/05/17   0903  11/04/17   0810   CRP  12.0*  15.0*  23.0*  31.0*  50.0*  96.0*     Recent Labs   Lab Test  11/09/17   0811  11/08/17   0810  11/07/17   0825  11/06/17   0717  11/05/17   0903  11/04/17   0810   WBC  7.7  7.9  10.0  8.1  6.1  7.4     Recent Labs   Lab Test  11/09/17   0811  11/08/17   0810  11/07/17   0825  11/06/17   1654   CR  3.30*  3.12*  3.11*  3.14*   GFRESTIMATED  14*  15*  15*  15*       Hematology Studies  Recent Labs   Lab Test  11/09/17   0811  11/08/17   0810  11/07/17   0825  11/06/17   0717  11/05/17   0903  11/04/17   0810   WBC  7.7  7.9  10.0  8.1  6.1  7.4   ANEU  5.8  5.7  7.1  5.6  4.3  5.4   AEOS  0.2  0.2  0.2  0.2  0.2  0.2   HCT  29.5*  29.7*  30.2*  27.5*  23.0*  24.6*   PLT  346  315  364  333  280  310       Metabolic  Recent Labs   Lab Test  11/09/17   0811  11/08/17   0810  11/07/17   0825   NA  143  147*  142   BUN  29  28  29   CO2  25  24  22   CR  3.30*  3.12*  3.11*   GFRESTIMATED  14*  15*  15*       Hepatic Studies  Recent Labs   Lab Test  11/07/17   0825  11/01/17   1414  09/21/17   1439   BILITOTAL  0.3  0.3  0.2   ALKPHOS  75  79  71   ALBUMIN  1.7*  1.9*  2.5*   AST  14  7  12   ALT  18  15  17       Immunologlobulins  Recent Labs   Lab Test  11/09/17   0811  11/07/17   0825  11/06/17   0717   03/22/17   0803   IGG   --    --    --    --   1090   IGM   --    --    --    --   147   IGA   --    --    --    --   368   SED  >140*  109*  >140*   < >   --     < > = values in this interval not displayed.

## 2017-11-10 NOTE — PLAN OF CARE
Problem: Patient Care Overview  Goal: Plan of Care/Patient Progress Review  Outcome: Therapy, progress toward functional goals is gradual  Pt. With improvement on R outer thigh. Reddness appears to be receding from markings. P IV with Atb x 2. Poor appetite on Cons. CHO diet, states that 'food does not taste good.' Dietitian requested to see pt. With dgt. Present, with whom she lives, for nutritional support. Mepilex on coccyx intact, add'l Mepilex added  for nonblanchabale area, skin pink and intact . Pt. Educated on freq. repositioning for skin integrity. Pt. Denies pain with scheduled Tyl.Lymphedema wraps replaced. BG covered. Plan per ID to con't ATb thru the wknd. K+ repl'ed w/ oral.

## 2017-11-10 NOTE — PLAN OF CARE
Problem: Patient Care Overview  Goal: Plan of Care/Patient Progress Review  Outcome: Improving  Pt A/O x4, VSS. Pain managed with scheduled Tylenol. Erythema on R upper thigh marked, improving since admission. Lymphedema wrap on LLE. BGs 337 at dinner. SS insulin given per order with meals and at bedtime. Pt had poor appetite tonight. Reports queasy feeling, no emesis. Did not want Zofran 2 small BMs today. Voiding spontaneously in adequate amounts. Pt has L LETTY, pivots with assist of 1 to commode. Daughter at beside and updated by doc this evening. Pt hopeful to DC after weekend. Cont POC.

## 2017-11-10 NOTE — PLAN OF CARE
Problem: Patient Care Overview  Goal: Plan of Care/Patient Progress Review  OT 7C: cancel, pt declining therapy this pm 2/2 nausea, will reschedule.

## 2017-11-10 NOTE — PROGRESS NOTES
[facility] - Inpatient daily progress note      Date of admission: 11/1/2017  Date of service: 11/10/2017       Assessment and Plan:   Assessment:   Supriya Herr is a 67 year old female with a significant past medical history of chronic kidney disease stage 3, congestive heart failure, uncontrolled diabetes type-2, HTN, HLD, chronic lymphedema, JONE and morbid obesity admitted for RLE cellulitis which has been very slow to improve on IV antibiotics  Patient Active Problem List   Diagnosis     Vitiligo     Traumatic amputation of leg above knee (H)     Contact dermatitis and other eczema, due to unspecified cause     Dermatophytosis of nail     Generalized osteoarthrosis, unspecified site     Hypertension goal BP (blood pressure) < 140/90     Mild nonproliferative diabetic retinopathy (H)     Proteinuria     Stage I pressure ulcer     Hyperlipidemia LDL goal <100     Non compliance with medical treatment     Tobacco abuse     Advanced directives, counseling/discussion     Incontinence of urine     Basal cell carcinoma     Senile nuclear sclerosis     JONE (obstructive sleep apnea)     CHF (congestive heart failure) (H)     Health Care Home     CKD (chronic kidney disease) stage 3, GFR 30-59 ml/min     Type 2 diabetes, HbA1C goal < 8% (H)     Type 2 diabetes mellitus with diabetic chronic kidney disease (H)     Morbid obesity (H)     Type 2 diabetes mellitus with stage 3 chronic kidney disease, without long-term current use of insulin (H)     Moderate recurrent major depression (H)     Type 2 diabetes mellitus with diabetic neuropathy, with long-term current use of insulin (H)     Cellulitis      Plan:    ## Right thigh Cellulitis: Improving  ## Chronic lymphedema: improving  Swelling, pain, redness now improving significantly.  Lymphedema wraps helping with swelling.    - Lymphedema wrap of right thigh/leg  - Patient euvoleumic, will contiue PTA diuretics  - ID on board, Continue vancomycin and  ceftriaxone  - Miconazole powder to right groin  - Elevate RLE as tolerated    ## CKD G4-5  Baseline creatinine 1.8-2.1 from earlier this year, but graphing 1/creatinine vs. Time shows progressive decline of renal function which has been progressing faster over last 12 months. Current GFR (13-16) likely new baseline.  - Continue lisinopril 40mg daily which is renal dosing for creatinine clearance 10-30 (PTA dose 80mg daily)  - Continue tolterodine 2mg for overactive bladder  - Avoid nephrotoxic meds when possible, renally dose medications  - Continue home lasix 80mg BID  - strict I/O's and daily weights       ## Diabetes- type 2: Uncontrolled    Uncontrolled diabetes with last Hgb A1c 9.2 (9/13/17).  Low fasting glucoses this admission, likely related to both medication adherence while hospitalized, improved diet while hospitalized, and poor appetite.  - Home regimen: Lantus 70u at bedtime, Novolog 6U with meals and sliding scale  - Hospital regimen: Lantus 20u at bedtime, increase from LSSI to MSSI  - Hypoglycemia protocol  - Moderate consistent carbohydrate diet     ## HTN: uncontrolled  ## Hyperlipidemia  - Continue PTA carvedilol 25mg BID  - Continue lisinopril (40 mg daily rather than PTA dose of 80mg daily)  - Continue [id number] PO q8h (new medication)   - Continue PTA amlodipine 10mg daily  - Continue furosemide 80mg BID  - Continue PTA Lipitor 20 mg daily      ##HFpEF  Last echocardiogram 12/2013 with normal LV function but technically difficulty study due to patient's obesity.  HFpEF documented in chart, but no impaired diastolic filling on echo  - Continue carvedilol 25mg BID  - consider repeat echocardiogram as outpatient     ## Normocytic Anemia- Stable  Hgb is stable with baseline ~9-10. She has a history of iron deficiency anemia.   - Consider iron studies and iron supplementation as outpatient      ## Neuropathy  - continue cymbalta 30mg BID     ##HLD  - Continue lipitor 20mg daily     ##  JONE  - Refuses use of CPAP since she is unable to tolerate      ##Stage 1 pressure ulcer on coccyx   - WOC consulted, mepilex dressing to coccyx (patient intermittently declines this dressing)     ## Physical deconditioning  - PT/OT consulted, encourage OOB     Fluids: PO  Electrolytes:WNL, will monitor  Nutrition: Moderate carb control diet  Lines:PIV  Activity: -Up as tolerated, PT, OT  CODE: Full Code  Prophylaxis: Heparin SQ  PCP communication:  - Noam [facility]  - Contacted at admission: No  - Concerns or updates: N/A  Dispo: Expected discharge date 1-2 days pending clinical improvement              Interval History:   Patient says that redness and swelling are now much better.  Pain is also much better.  BG high yesterday to 300's.  BP control improved.  No fevers or chills.  No drainage.              Review of Systems:   Review of Systems   Constitutional: Negative for chills and fever.   Eyes: Negative for visual disturbance.   Respiratory: Negative for cough and shortness of breath.    Cardiovascular: Positive for leg swelling. Negative for chest pain.   Gastrointestinal: Negative for abdominal pain, constipation, diarrhea, nausea and vomiting.   Genitourinary: Negative for dysuria.   Musculoskeletal: Positive for myalgias.   Skin: Positive for rash and wound.   Neurological: Negative for light-headedness and headaches.          Physical Exam (Resident / Clinician):     Vitals were reviewed  Patient Vitals for the past 24 hrs:   BP Temp Temp src Pulse Heart Rate Resp SpO2 Weight   11/10/17 0829 163/65 97.9  F (36.6  C) Oral - 90 18 93 % -   11/09/17 2229 146/46 97.7  F (36.5  C) Oral - 82 18 93 % -   11/09/17 1945 139/45 97.9  F (36.6  C) Oral - 76 20 94 % -   11/09/17 1535 163/51 98  F (36.7  C) Oral 83 - 18 93 % -   11/09/17 1258 - - - - - - - 100.7 kg (222 lb 1.6 oz)   11/09/17 1215 150/54 98  F (36.7  C) Oral - 76 18 95 % -   11/09/17 0946 171/59 - - - 88 - - -       Physical Exam    Constitutional: She is oriented to person, place, and time. She appears well-developed and well-nourished. No distress.   HENT:   Head: Normocephalic and atraumatic.   Eyes: Conjunctivae are normal.   Neck: Neck supple.   Cardiovascular: Normal rate and regular rhythm.    Pulmonary/Chest: Effort normal and breath sounds normal. No respiratory distress.   Abdominal: Soft. Bowel sounds are normal. There is no tenderness. There is no rebound and no guarding.   Musculoskeletal: She exhibits no edema (1+ pitting edema RLE, significant swelling right thigh (improved)).   Right thigh erythema markedly improved, decreased area of erythema and lighter hue of pink. No fluctuance or expressible drainage.  S/P Left AKA   Neurological: She is alert and oriented to person, place, and time.   Skin: Skin is warm and dry. She is not diaphoretic.   Psychiatric: She has a normal mood and affect.             Data:   ROUTINE LABS (Last four results)  CMP    Recent Labs  Lab 11/09/17  0811 11/08/17  0810 11/07/17  0825 11/06/17  1654 11/06/17  0717    147* 142 144 143   POTASSIUM 3.6 3.6 3.6 4.0 3.7   CHLORIDE 112* 113* 112* 113* 114*   CO2 25 24 22 24 20   ANIONGAP 7 10 9 7 9   * 65* 50* 171* 72   BUN 29 28 29 33* 31*   CR 3.30* 3.12* 3.11* 3.14* 3.14*   GFRESTIMATED 14* 15* 15* 15* 15*   GFRESTBLACK 17* 18* 18* 18* 18*   JODY 8.3* 8.5 8.8 8.2* 8.6   MAG 1.9 2.0 2.0  --  2.0   PHOS 5.0* 4.6* 4.7*  --  4.7*   PROTTOTAL  --   --  7.4  --   --    ALBUMIN  --   --  1.7*  --   --    BILITOTAL  --   --  0.3  --   --    ALKPHOS  --   --  75  --   --    AST  --   --  14  --   --    ALT  --   --  18  --   --      CBC    Recent Labs  Lab 11/10/17  0701 11/09/17  0811 11/08/17  0810 11/07/17  0825 11/06/17  0717   WBC  --  7.7 7.9 10.0 8.1   RBC  --  3.36* 3.37* 3.42* 3.14*   HGB  --  9.3* 9.3* 9.5* 8.7*   HCT  --  29.5* 29.7* 30.2* 27.5*   MCV  --  88 88 88 88   MCH  --  27.7 27.6 27.8 27.7   MCHC  --  31.5 31.3* 31.5 31.6   RDW  --   14.5 14.4 14.4 14.5    346 315 364 333     INRNo lab results found in last 7 days.  CRP    Recent Labs  Lab 11/09/17  0811 11/08/17  0810 11/07/17  0825 11/06/17  0717   CRP 12.0* 15.0* 23.0* 31.0*         Blood culture:  Invalid input(s): BC   Urine culture:  No results for input(s): URC in the last 168 hours.  All cultures:  No results for input(s): CULT in the last 168 hours.        Medications:     Current Facility-Administered Medications   Medication     insulin aspart (NovoLOG) inj (RAPID ACTING)     insulin aspart (NovoLOG) inj (RAPID ACTING)     cefTRIAXone (ROCEPHIN) 1 g vial to attach to  mL bag for ADULTS or NS 50 mL bag for PEDS     hydrALAZINE (APRESOLINE) tablet 100 mg     furosemide (LASIX) tablet 80 mg     insulin glargine (LANTUS) injection 20 Units     lisinopril (PRINIVIL/ZESTRIL) tablet 40 mg     amLODIPine (NORVASC) tablet 10 mg     vancomycin (VANCOCIN) 1,500 mg in NaCl 0.9 % 250 mL intermittent infusion     benzocaine-menthol (CEPACOL) 15-3.6 MG lozenge 1 lozenge     hydrALAZINE (APRESOLINE) injection 10 mg     loratadine (CLARITIN) tablet 10 mg     miconazole (MICATIN; MICRO GUARD) 2 % powder     lactobacillus rhamnosus (GG) (CULTURELL) capsule 1 capsule     heparin injection 7,500 Units     albuterol (PROAIR HFA/PROVENTIL HFA/VENTOLIN HFA) Inhaler 2 puff     atorvastatin (LIPITOR) tablet 20 mg     carvedilol (COREG) tablet 25 mg     DULoxetine (CYMBALTA) EC capsule 30 mg     naloxone (NARCAN) injection 0.1-0.4 mg     polyethylene glycol (MIRALAX/GLYCOLAX) Packet 17 g     ondansetron (ZOFRAN-ODT) ODT tab 4 mg    Or     ondansetron (ZOFRAN) injection 4 mg     glucose 40 % gel 15-30 g    Or     dextrose 50 % injection 25-50 mL    Or     glucagon injection 1 mg     acetaminophen (TYLENOL) tablet 650 mg       Caring Physician: Brayden Carrasco MD  [facility]  Pager Contact: see Physician sticky note

## 2017-11-10 NOTE — PLAN OF CARE
Problem: Patient Care Overview  Goal: Plan of Care/Patient Progress Review  Edema 7C: Pt with reduced edema in R LE, 1+ in distal leg and moderate 1+/2+ in thigh. Redness dissipating, skin more pliable. Plan for pt to wear thigh high GCB to R thigh x 24-48 hours with removal if wraps cause numbness, tingling, or pain. Recommend follow up with OP lymphedema.

## 2017-11-10 NOTE — PLAN OF CARE
Problem: Patient Care Overview  Goal: Plan of Care/Patient Progress Review  AVSS. C/o headache, po tylenol given (scheduled 4x/day, however, pt had declined x2 yesterday), reports leg pain absent/negligible. Right upper thigh erythema, marked, per pt improved. Lymph wraps on right lower extremity. Consistent CHO diet, denied nausea overnight. L BKA, pivots with assist of 1 to commode. Passing gas, had 1 soft BM overnight. Pt eager to DC.

## 2017-11-11 ENCOUNTER — APPOINTMENT (OUTPATIENT)
Dept: OCCUPATIONAL THERAPY | Facility: CLINIC | Age: 67
DRG: 603 | End: 2017-11-11
Payer: MEDICARE

## 2017-11-11 LAB
ANION GAP SERPL CALCULATED.3IONS-SCNC: 8 MMOL/L (ref 3–14)
BUN SERPL-MCNC: 32 MG/DL (ref 7–30)
CALCIUM SERPL-MCNC: 8.6 MG/DL (ref 8.5–10.1)
CHLORIDE SERPL-SCNC: 113 MMOL/L (ref 94–109)
CO2 SERPL-SCNC: 24 MMOL/L (ref 20–32)
CREAT SERPL-MCNC: 3.26 MG/DL (ref 0.52–1.04)
GFR SERPL CREATININE-BSD FRML MDRD: 14 ML/MIN/1.7M2
GLUCOSE BLDC GLUCOMTR-MCNC: 119 MG/DL (ref 70–99)
GLUCOSE BLDC GLUCOMTR-MCNC: 144 MG/DL (ref 70–99)
GLUCOSE BLDC GLUCOMTR-MCNC: 169 MG/DL (ref 70–99)
GLUCOSE BLDC GLUCOMTR-MCNC: 169 MG/DL (ref 70–99)
GLUCOSE BLDC GLUCOMTR-MCNC: 170 MG/DL (ref 70–99)
GLUCOSE BLDC GLUCOMTR-MCNC: 225 MG/DL (ref 70–99)
GLUCOSE SERPL-MCNC: 135 MG/DL (ref 70–99)
MAGNESIUM SERPL-MCNC: 2 MG/DL (ref 1.6–2.3)
PHOSPHATE SERPL-MCNC: 4.6 MG/DL (ref 2.5–4.5)
POTASSIUM SERPL-SCNC: 4.1 MMOL/L (ref 3.4–5.3)
SODIUM SERPL-SCNC: 145 MMOL/L (ref 133–144)
VANCOMYCIN SERPL-MCNC: 26 MG/L

## 2017-11-11 PROCEDURE — 25000132 ZZH RX MED GY IP 250 OP 250 PS 637: Mod: GY | Performed by: FAMILY MEDICINE

## 2017-11-11 PROCEDURE — 83735 ASSAY OF MAGNESIUM: CPT | Performed by: FAMILY MEDICINE

## 2017-11-11 PROCEDURE — 40000133 ZZH STATISTIC OT WARD VISIT

## 2017-11-11 PROCEDURE — A9270 NON-COVERED ITEM OR SERVICE: HCPCS | Mod: GY | Performed by: FAMILY MEDICINE

## 2017-11-11 PROCEDURE — 00000146 ZZHCL STATISTIC GLUCOSE BY METER IP

## 2017-11-11 PROCEDURE — 84100 ASSAY OF PHOSPHORUS: CPT | Performed by: FAMILY MEDICINE

## 2017-11-11 PROCEDURE — 25000131 ZZH RX MED GY IP 250 OP 636 PS 637: Mod: GY | Performed by: FAMILY MEDICINE

## 2017-11-11 PROCEDURE — 12000001 ZZH R&B MED SURG/OB UMMC

## 2017-11-11 PROCEDURE — 25000128 H RX IP 250 OP 636: Performed by: FAMILY MEDICINE

## 2017-11-11 PROCEDURE — 80202 ASSAY OF VANCOMYCIN: CPT | Performed by: FAMILY MEDICINE

## 2017-11-11 PROCEDURE — 97140 MANUAL THERAPY 1/> REGIONS: CPT | Mod: GO

## 2017-11-11 PROCEDURE — 25000125 ZZHC RX 250: Performed by: FAMILY MEDICINE

## 2017-11-11 PROCEDURE — 80048 BASIC METABOLIC PNL TOTAL CA: CPT | Performed by: FAMILY MEDICINE

## 2017-11-11 PROCEDURE — 36415 COLL VENOUS BLD VENIPUNCTURE: CPT | Performed by: FAMILY MEDICINE

## 2017-11-11 RX ADMIN — HEPARIN SODIUM 7500 UNITS: 10000 INJECTION, SOLUTION INTRAVENOUS; SUBCUTANEOUS at 01:27

## 2017-11-11 RX ADMIN — Medication 1 CAPSULE: at 09:04

## 2017-11-11 RX ADMIN — CEFTRIAXONE 1 G: 1 INJECTION, POWDER, FOR SOLUTION INTRAMUSCULAR; INTRAVENOUS at 12:32

## 2017-11-11 RX ADMIN — ATORVASTATIN CALCIUM 20 MG: 20 TABLET, FILM COATED ORAL at 09:04

## 2017-11-11 RX ADMIN — INSULIN GLARGINE 20 UNITS: 100 INJECTION, SOLUTION SUBCUTANEOUS at 22:09

## 2017-11-11 RX ADMIN — Medication 1 CAPSULE: at 20:46

## 2017-11-11 RX ADMIN — ACETAMINOPHEN 650 MG: 325 TABLET, FILM COATED ORAL at 09:05

## 2017-11-11 RX ADMIN — CARVEDILOL 25 MG: 6.25 TABLET, FILM COATED ORAL at 17:10

## 2017-11-11 RX ADMIN — INSULIN GLARGINE 20 UNITS: 100 INJECTION, SOLUTION SUBCUTANEOUS at 01:26

## 2017-11-11 RX ADMIN — HYDRALAZINE HYDROCHLORIDE 100 MG: 25 TABLET ORAL at 01:26

## 2017-11-11 RX ADMIN — ONDANSETRON 4 MG: 4 TABLET, ORALLY DISINTEGRATING ORAL at 09:24

## 2017-11-11 RX ADMIN — ACETAMINOPHEN 650 MG: 325 TABLET, FILM COATED ORAL at 04:21

## 2017-11-11 RX ADMIN — FUROSEMIDE 80 MG: 20 TABLET ORAL at 16:47

## 2017-11-11 RX ADMIN — AMLODIPINE BESYLATE 10 MG: 10 TABLET ORAL at 09:05

## 2017-11-11 RX ADMIN — HEPARIN SODIUM 7500 UNITS: 10000 INJECTION, SOLUTION INTRAVENOUS; SUBCUTANEOUS at 12:33

## 2017-11-11 RX ADMIN — ACETAMINOPHEN 650 MG: 325 TABLET, FILM COATED ORAL at 20:46

## 2017-11-11 RX ADMIN — FUROSEMIDE 80 MG: 20 TABLET ORAL at 09:04

## 2017-11-11 RX ADMIN — HEPARIN SODIUM 7500 UNITS: 10000 INJECTION, SOLUTION INTRAVENOUS; SUBCUTANEOUS at 22:12

## 2017-11-11 RX ADMIN — DULOXETINE HYDROCHLORIDE 30 MG: 30 CAPSULE, DELAYED RELEASE ORAL at 09:05

## 2017-11-11 RX ADMIN — ACETAMINOPHEN 650 MG: 325 TABLET, FILM COATED ORAL at 16:47

## 2017-11-11 RX ADMIN — LISINOPRIL 40 MG: 40 TABLET ORAL at 09:05

## 2017-11-11 RX ADMIN — HYDRALAZINE HYDROCHLORIDE 100 MG: 25 TABLET ORAL at 09:04

## 2017-11-11 RX ADMIN — DULOXETINE HYDROCHLORIDE 30 MG: 30 CAPSULE, DELAYED RELEASE ORAL at 20:46

## 2017-11-11 RX ADMIN — CARVEDILOL 25 MG: 6.25 TABLET, FILM COATED ORAL at 09:05

## 2017-11-11 ASSESSMENT — ENCOUNTER SYMPTOMS
FEVER: 0
DIARRHEA: 0
NAUSEA: 0
WOUND: 1
HEADACHES: 0
LIGHT-HEADEDNESS: 0
COUGH: 0
DYSURIA: 0
VOMITING: 0
MYALGIAS: 1
SHORTNESS OF BREATH: 0
CONSTIPATION: 0
CHILLS: 0
ABDOMINAL PAIN: 0

## 2017-11-11 ASSESSMENT — PAIN DESCRIPTION - DESCRIPTORS
DESCRIPTORS: SORE
DESCRIPTORS: HEADACHE
DESCRIPTORS: HEADACHE

## 2017-11-11 NOTE — PLAN OF CARE
Problem: Patient Care Overview  Goal: Individualization & Mutuality  Outcome: Therapy, progress towards functional goals is fair     Discomfort managed with tylenol, pt states generalized soreness, no particular area.   Upper RLE with receding redness, lymphedema wrap on lower RLE. Denies numbness/tingling.   Up with SBA to commode. Voiding adequate amts.  Encouraged weight shift and repositioning. Mepilex on coccyx c/d/i.  Pt states she hopes to discharge on Monday.

## 2017-11-11 NOTE — PROGRESS NOTES
[facility] Medicine - Inpatient daily progress note      Date of admission: 11/1/2017  Date of service: November 11, 2017       Assessment and Plan:   Assessment:   This is a 67 year old female with a significant past medical history of chronic kidney disease stage 3, congestive heart failure, uncontrolled diabetes type-2, HTN, HLD, chronic lymphedema, JONE and morbid obesity admitted for RLE cellulitis which has been very slow to improve on IV antibiotics  Patient Active Problem List   Diagnosis     Vitiligo     Traumatic amputation of leg above knee (H)     Contact dermatitis and other eczema, due to unspecified cause     Dermatophytosis of nail     Generalized osteoarthrosis, unspecified site     Hypertension goal BP (blood pressure) < 140/90     Mild nonproliferative diabetic retinopathy (H)     Proteinuria     Stage I pressure ulcer     Hyperlipidemia LDL goal <100     Non compliance with medical treatment     Tobacco abuse     Advanced directives, counseling/discussion     Incontinence of urine     Basal cell carcinoma     Senile nuclear sclerosis     JONE (obstructive sleep apnea)     CHF (congestive heart failure) (H)     Health Care Home     CKD (chronic kidney disease) stage 3, GFR 30-59 ml/min     Type 2 diabetes, HbA1C goal < 8% (H)     Type 2 diabetes mellitus with diabetic chronic kidney disease (H)     Morbid obesity (H)     Type 2 diabetes mellitus with stage 3 chronic kidney disease, without long-term current use of insulin (H)     Moderate recurrent major depression (H)     Type 2 diabetes mellitus with diabetic neuropathy, with long-term current use of insulin (H)     Cellulitis      Plan:    ## Right thigh Cellulitis: Improving  ## Chronic lymphedema: improving  Admitted for right thigh cellulitis that initially progressed despite IV antibiotics. Imaging on admission was concerning for necrotizing fascitis. Surgery was consulted and no indication for surgery was recommended. Swelling and  erythema slowly improving on Vancomycin and Ceftriaxone.    - Lymphedema wrap of right thigh/leg  - Patient euvoleumic, will contiue PTA diuretics  - ID on board, Continue vancomycin and ceftriaxone  - Lasix 80 mg BID (PTA)  - Miconazole powder to right groin  - Elevate RLE as tolerated    ## Acute hypernatremia: Likely 2/2 diuretics   ## Hyperphosphatemia: Likely 2/2 chronic kidney disease   - No acute intervention.   - Monitor daily BMP and electrolytes     ## CKD G4-5:   Baseline creatinine 1.8-2.1 from earlier this year, but graphing 1/creatinine vs. Time shows progressive decline of renal function which has been progressing faster over last 12 months. Current GFR (13-16) likely new baseline.  - Continue lisinopril 40mg daily which is renal dosing for creatinine clearance 10-30 (PTA dose 80mg daily)  - Continue tolterodine 2mg for overactive bladder  - Avoid nephrotoxic meds when possible, renally dose medications  - Continue home lasix 80mg BID  - strict I/O's and daily weights       ## Diabetes- type 2: Uncontrolled    Uncontrolled diabetes with last Hgb A1c 9.2 (9/13/17).  Low fasting glucoses this admission, likely related to both medication adherence while hospitalized, improved diet while hospitalized, and poor appetite.  - Home regimen: Lantus 70u at bedtime, Novolog 6U with meals and sliding scale  - Hospital regimen: Lantus 20u at bedtime, increase from LSSI to MSSI  - Hypoglycemia protocol  - Moderate consistent carbohydrate diet     ## HTN: uncontrolled  ## Hyperlipidemia  - Continue PTA carvedilol 25mg BID  - Continue lisinopril (40 mg daily rather than PTA dose of 80mg daily)  - Continue [id number] PO q8h (new medication)   - Continue PTA amlodipine 10mg daily  - Continue furosemide 80mg BID  - Continue PTA Lipitor 20 mg daily      ##HFpEF  Last echocardiogram 12/2013 with normal LV function but technically difficulty study due to patient's obesity.  HFpEF documented in chart, but no impaired  diastolic filling on echo  - Continue carvedilol 25mg BID  - consider repeat echocardiogram as outpatient     ## Normocytic Anemia- Stable  Hgb is stable with baseline ~9-10. She has a history of iron deficiency anemia.   - Consider iron studies and iron supplementation as outpatient      ## Neuropathy  - continue cymbalta 30mg BID     ##HLD  - Continue lipitor 20mg daily     ## JONE  - Refuses use of CPAP since she is unable to tolerate      ##Stage 1 pressure ulcer on coccyx   - WOC consulted, mepilex dressing to coccyx (patient intermittently declines this dressing)     ## Physical deconditioning  - PT/OT consulted, encourage OOB     Fluids: PO  Electrolytes:Hypernatremia and hyperphosphatemia   Nutrition: Moderate carb control diet  Lines:PIV  Activity: -Up as tolerated, PT, OT  CODE: Full Code  Prophylaxis: Heparin SQ  PCP communication:  - Noam Jackson  - Contacted at admission: No  - Concerns or updates: N/A  Dispo: Expected discharge date 1-2 days pending clinical improvement              Interval History:   Patient was seen and evaluated this morning. She reports improved swelling and erythema . Vitals signs and nurses notes reviewed. Medical management including labs, vitals and care plan were discussed with patient. Patient would be update during rounds with attending physician with additional management and plan.               Review of Systems:   Review of Systems   Constitutional: Negative for chills and fever.   Eyes: Negative for visual disturbance.   Respiratory: Negative for cough and shortness of breath.    Cardiovascular: Positive for leg swelling. Negative for chest pain.   Gastrointestinal: Negative for abdominal pain, constipation, diarrhea, nausea and vomiting.   Genitourinary: Negative for dysuria.   Musculoskeletal: Positive for myalgias.   Skin: Positive for rash and wound.   Neurological: Negative for light-headedness and headaches.          Physical Exam (Resident /  Clinician):     Vitals were reviewed  Patient Vitals for the past 24 hrs:   BP Temp Temp src Pulse Heart Rate Resp SpO2   11/11/17 0712 171/63 98  F (36.7  C) Oral 86 - 20 90 %   11/10/17 2339 168/56 97.4  F (36.3  C) Oral - 84 16 94 %   11/10/17 2011 133/47 - - - - - -   11/10/17 2000 (!) 131/39 97.9  F (36.6  C) Oral - 80 16 96 %   11/10/17 1755 161/50 - - - 93 - -   11/10/17 1400 152/72 97.1  F (36.2  C) Oral - 79 16 95 %       Physical Exam   Constitutional: She is oriented to person, place, and time. She appears well-developed and well-nourished. No distress.   HENT:   Head: Normocephalic and atraumatic.   Eyes: Conjunctivae are normal.   Neck: Neck supple.   Cardiovascular: Normal rate and regular rhythm.    Pulmonary/Chest: Effort normal and breath sounds normal. No respiratory distress.   Abdominal: Soft. Bowel sounds are normal. There is no tenderness. There is no rebound and no guarding.   Musculoskeletal: She exhibits no edema (1+ pitting edema RLE, significant swelling right thigh (improved)).   Right thigh erythema markedly improved, decreased area of erythema and lighter hue of pink. No fluctuance or expressible drainage.  S/P Left AKA   Neurological: She is alert and oriented to person, place, and time.   Skin: Skin is warm and dry. She is not diaphoretic.   Psychiatric: She has a normal mood and affect.             Data:   ROUTINE LABS (Last four results)  CMP    Recent Labs  Lab 11/11/17  0722 11/10/17  1030 11/09/17  0811 11/08/17  0810 11/07/17  0825   * 143 143 147* 142   POTASSIUM 4.1 3.2* 3.6 3.6 3.6   CHLORIDE 113* 110* 112* 113* 112*   CO2 24 26 25 24 22   ANIONGAP 8 7 7 10 9   * 200* 108* 65* 50*   BUN 32* 30 29 28 29   CR 3.26* 3.21* 3.30* 3.12* 3.11*   GFRESTIMATED 14* 14* 14* 15* 15*   GFRESTBLACK 17* 17* 17* 18* 18*   JODY 8.6 8.0* 8.3* 8.5 8.8   MAG 2.0 1.8 1.9 2.0 2.0   PHOS 4.6* 4.7* 5.0* 4.6* 4.7*   PROTTOTAL  --   --   --   --  7.4   ALBUMIN  --   --   --   --  1.7*    BILITOTAL  --   --   --   --  0.3   ALKPHOS  --   --   --   --  75   AST  --   --   --   --  14   ALT  --   --   --   --  18     CBC    Recent Labs  Lab 11/10/17  0701 11/09/17  0811 11/08/17  0810 11/07/17  0825 11/06/17  0717   WBC  --  7.7 7.9 10.0 8.1   RBC  --  3.36* 3.37* 3.42* 3.14*   HGB  --  9.3* 9.3* 9.5* 8.7*   HCT  --  29.5* 29.7* 30.2* 27.5*   MCV  --  88 88 88 88   MCH  --  27.7 27.6 27.8 27.7   MCHC  --  31.5 31.3* 31.5 31.6   RDW  --  14.5 14.4 14.4 14.5    346 315 364 333     INRNo lab results found in last 7 days.  CRP    Recent Labs  Lab 11/09/17  0811 11/08/17 0810 11/07/17 0825 11/06/17 0717   CRP 12.0* 15.0* 23.0* 31.0*         Blood culture:  Invalid input(s): BC   Urine culture:  No results for input(s): URC in the last 168 hours.  All cultures:  No results for input(s): CULT in the last 168 hours.    Intake/Output Summary (Last 24 hours) at 11/11/17 1541  Last data filed at 11/11/17 1509   Gross per 24 hour   Intake              120 ml   Output             2150 ml   Net            -2030 ml         Medications:     Current Facility-Administered Medications   Medication     insulin aspart (NovoLOG) inj (RAPID ACTING)     insulin aspart (NovoLOG) inj (RAPID ACTING)     - MEDICATION INSTRUCTIONS -     cefTRIAXone (ROCEPHIN) 1 g vial to attach to  mL bag for ADULTS or NS 50 mL bag for PEDS     hydrALAZINE (APRESOLINE) tablet 100 mg     furosemide (LASIX) tablet 80 mg     insulin glargine (LANTUS) injection 20 Units     lisinopril (PRINIVIL/ZESTRIL) tablet 40 mg     amLODIPine (NORVASC) tablet 10 mg     vancomycin (VANCOCIN) 1,500 mg in NaCl 0.9 % 250 mL intermittent infusion     benzocaine-menthol (CEPACOL) 15-3.6 MG lozenge 1 lozenge     hydrALAZINE (APRESOLINE) injection 10 mg     loratadine (CLARITIN) tablet 10 mg     miconazole (MICATIN; MICRO GUARD) 2 % powder     lactobacillus rhamnosus (GG) (CULTURELL) capsule 1 capsule     heparin injection 7,500 Units     albuterol  (PROAIR HFA/PROVENTIL HFA/VENTOLIN HFA) Inhaler 2 puff     atorvastatin (LIPITOR) tablet 20 mg     carvedilol (COREG) tablet 25 mg     DULoxetine (CYMBALTA) EC capsule 30 mg     naloxone (NARCAN) injection 0.1-0.4 mg     polyethylene glycol (MIRALAX/GLYCOLAX) Packet 17 g     ondansetron (ZOFRAN-ODT) ODT tab 4 mg    Or     ondansetron (ZOFRAN) injection 4 mg     glucose 40 % gel 15-30 g    Or     dextrose 50 % injection 25-50 mL    Or     glucagon injection 1 mg     acetaminophen (TYLENOL) tablet 650 mg       Caring Physician: Brendon Acuna MD  [facility], [facility]  Pager Contact: see Physician sticky note

## 2017-11-11 NOTE — PLAN OF CARE
Problem: Patient Care Overview  Goal: Plan of Care/Patient Progress Review  Outcome: No Change  VSS. BP max 161/50. After scheduled meds given /47. Patient reports intermittent sharp pain in right upper leg. Resolves with repositioning/rest. Right lower leg with lymphedema wraps in place. Requested Tylenol before bed only. Tolerating DM diet fairly. Needs encouragement to eat high protein/controlled carb meals. Has low sugar Ensure brought from daughter at bedside. BG checks stable, sightly elevated. Pivots to commode with assist of 1. Voiding, small soft bm x2. Coccyx/sacrum with small open area with non-blanchable redness surrounding. Wound cares done, sacral mepilex foam now in place. Patient educated on importance of repositioning - verbalized understanding, not demonstrating.      Continue with POC. Continue education about glycemic management, diet, and repositioning.

## 2017-11-11 NOTE — PLAN OF CARE
Problem: Patient Care Overview  Goal: Plan of Care/Patient Progress Review  Edema 7C: GCB removed, skin checked, and GCB reapplied to air redcutions in RLE swelling for infection prevention, skin healing, and better transfers and mobility. Pt educated on utility of OP Lymphedema care follow up for daily compression garment fitting and use. Cellulitis infection related to chronic swelling and how need to reduce swelling will reduce chances of infections.

## 2017-11-11 NOTE — PLAN OF CARE
Problem: Patient Care Overview  Goal: Plan of Care/Patient Progress Review  Outcome: Therapy, progress toward functional goals is gradual  BP hypertensive this morning 171/63, improved after morning BP medications. OVSS, on room air. Tylenol given for headache, effective. Regular diet, fair appetite. BG checks AC/HS. AKA, left leg. Right leg with cellulitis, redness continuing to recede, IV abx continue. Right leg wrapped in lymphedema wrap. PIV SL between abx. Up with 1-assist to bedside commode. Pressure injury to coccyx, mepilex in place. Pt encouraged to reposition frequently. Voiding spont, adequate UOP. Passing flatus, no BM today. Continue with POC.     Addendum: Pt offered assistance with cleaning up, repositioning, and sitting up in chair multiple times today. Pt states she is very tired and would like to rest, declined to clean up/sit in chair today. Pt states she is repositioning herself frequently, declines need for assistance. This RN does visualize her repositioning and rearranging support pillows. Education and emotional support provided.

## 2017-11-12 ENCOUNTER — APPOINTMENT (OUTPATIENT)
Dept: OCCUPATIONAL THERAPY | Facility: CLINIC | Age: 67
DRG: 603 | End: 2017-11-12
Payer: MEDICARE

## 2017-11-12 LAB
GLUCOSE BLDC GLUCOMTR-MCNC: 168 MG/DL (ref 70–99)
GLUCOSE BLDC GLUCOMTR-MCNC: 178 MG/DL (ref 70–99)
GLUCOSE BLDC GLUCOMTR-MCNC: 91 MG/DL (ref 70–99)

## 2017-11-12 PROCEDURE — 40000133 ZZH STATISTIC OT WARD VISIT: Performed by: OCCUPATIONAL THERAPIST

## 2017-11-12 PROCEDURE — 25000128 H RX IP 250 OP 636: Performed by: FAMILY MEDICINE

## 2017-11-12 PROCEDURE — 25000125 ZZHC RX 250: Performed by: FAMILY MEDICINE

## 2017-11-12 PROCEDURE — 25000132 ZZH RX MED GY IP 250 OP 250 PS 637: Mod: GY | Performed by: FAMILY MEDICINE

## 2017-11-12 PROCEDURE — 97530 THERAPEUTIC ACTIVITIES: CPT | Mod: GO | Performed by: OCCUPATIONAL THERAPIST

## 2017-11-12 PROCEDURE — A9270 NON-COVERED ITEM OR SERVICE: HCPCS | Mod: GY | Performed by: FAMILY MEDICINE

## 2017-11-12 PROCEDURE — 00000146 ZZHCL STATISTIC GLUCOSE BY METER IP

## 2017-11-12 PROCEDURE — 97535 SELF CARE MNGMENT TRAINING: CPT | Mod: GO | Performed by: OCCUPATIONAL THERAPIST

## 2017-11-12 PROCEDURE — 12000001 ZZH R&B MED SURG/OB UMMC

## 2017-11-12 PROCEDURE — 40000141 ZZH STATISTIC PERIPHERAL IV START W/O US GUIDANCE

## 2017-11-12 RX ORDER — SPIRONOLACTONE 25 MG/1
25 TABLET ORAL DAILY
Status: DISCONTINUED | OUTPATIENT
Start: 2017-11-12 | End: 2017-11-13 | Stop reason: HOSPADM

## 2017-11-12 RX ORDER — VANCOMYCIN HYDROCHLORIDE 1 G/200ML
1000 INJECTION, SOLUTION INTRAVENOUS
Status: DISCONTINUED | OUTPATIENT
Start: 2017-11-12 | End: 2017-11-13 | Stop reason: HOSPADM

## 2017-11-12 RX ADMIN — INSULIN GLARGINE 20 UNITS: 100 INJECTION, SOLUTION SUBCUTANEOUS at 23:10

## 2017-11-12 RX ADMIN — MICONAZOLE NITRATE: 2 POWDER TOPICAL at 08:29

## 2017-11-12 RX ADMIN — ACETAMINOPHEN 650 MG: 325 TABLET, FILM COATED ORAL at 08:28

## 2017-11-12 RX ADMIN — FUROSEMIDE 80 MG: 20 TABLET ORAL at 08:28

## 2017-11-12 RX ADMIN — HYDRALAZINE HYDROCHLORIDE 100 MG: 25 TABLET ORAL at 16:49

## 2017-11-12 RX ADMIN — VANCOMYCIN HYDROCHLORIDE 1000 MG: 1 INJECTION, SOLUTION INTRAVENOUS at 11:51

## 2017-11-12 RX ADMIN — ACETAMINOPHEN 650 MG: 325 TABLET, FILM COATED ORAL at 23:05

## 2017-11-12 RX ADMIN — SPIRONOLACTONE 25 MG: 25 TABLET ORAL at 14:13

## 2017-11-12 RX ADMIN — Medication 1 CAPSULE: at 20:18

## 2017-11-12 RX ADMIN — DULOXETINE HYDROCHLORIDE 30 MG: 30 CAPSULE, DELAYED RELEASE ORAL at 08:28

## 2017-11-12 RX ADMIN — DULOXETINE HYDROCHLORIDE 30 MG: 30 CAPSULE, DELAYED RELEASE ORAL at 20:18

## 2017-11-12 RX ADMIN — ATORVASTATIN CALCIUM 20 MG: 20 TABLET, FILM COATED ORAL at 08:28

## 2017-11-12 RX ADMIN — HEPARIN SODIUM 7500 UNITS: 10000 INJECTION, SOLUTION INTRAVENOUS; SUBCUTANEOUS at 14:01

## 2017-11-12 RX ADMIN — MICONAZOLE NITRATE: 2 POWDER TOPICAL at 23:02

## 2017-11-12 RX ADMIN — CEFTRIAXONE 1 G: 1 INJECTION, POWDER, FOR SOLUTION INTRAMUSCULAR; INTRAVENOUS at 14:01

## 2017-11-12 RX ADMIN — HYDRALAZINE HYDROCHLORIDE 100 MG: 25 TABLET ORAL at 23:06

## 2017-11-12 RX ADMIN — HEPARIN SODIUM 7500 UNITS: 10000 INJECTION, SOLUTION INTRAVENOUS; SUBCUTANEOUS at 23:03

## 2017-11-12 RX ADMIN — Medication 1 CAPSULE: at 08:28

## 2017-11-12 RX ADMIN — ONDANSETRON 4 MG: 4 TABLET, ORALLY DISINTEGRATING ORAL at 11:51

## 2017-11-12 RX ADMIN — LISINOPRIL 40 MG: 40 TABLET ORAL at 08:28

## 2017-11-12 RX ADMIN — HEPARIN SODIUM 7500 UNITS: 10000 INJECTION, SOLUTION INTRAVENOUS; SUBCUTANEOUS at 06:01

## 2017-11-12 RX ADMIN — CARVEDILOL 25 MG: 6.25 TABLET, FILM COATED ORAL at 08:28

## 2017-11-12 RX ADMIN — FUROSEMIDE 80 MG: 20 TABLET ORAL at 16:50

## 2017-11-12 RX ADMIN — CARVEDILOL 25 MG: 6.25 TABLET, FILM COATED ORAL at 20:12

## 2017-11-12 RX ADMIN — AMLODIPINE BESYLATE 10 MG: 10 TABLET ORAL at 08:27

## 2017-11-12 ASSESSMENT — ENCOUNTER SYMPTOMS
WOUND: 1
CHILLS: 0
FEVER: 0
ABDOMINAL PAIN: 0
DYSURIA: 0
DIARRHEA: 0
LIGHT-HEADEDNESS: 0
VOMITING: 0
CONSTIPATION: 0
HEADACHES: 0
SHORTNESS OF BREATH: 0
NAUSEA: 0
MYALGIAS: 1
COUGH: 0

## 2017-11-12 ASSESSMENT — PAIN DESCRIPTION - DESCRIPTORS: DESCRIPTORS: HEADACHE

## 2017-11-12 NOTE — PROGRESS NOTES
[facility] ID SERVICE: ONGOING CONSULTATION   Supriya Herr : 1950 Sex: female:   Medical record number [id number] Attending Physician: Demetria Juan DO  Date of Service: November 10, 2017    PROBLEM LIST:   #Moderate to severe RLE Cellulitis - improving  #Intertrigo  #Lymphedema  #Obesity  #T2DM  #CKD  #HFpEF, HTN, HLD  #Stage I Pressure Ulcer    RECOMMENDATIONS:   - Continue Vancomycin (goal trough 13-18 mg/L) and Ceftriaxone 1g IV q24, last does tomorrow, which would complete 14 days of IV therapy  Since she still has erythema and this was a severe infection with evidence of severe edema without myositis on CT leg, I would propose an additional 1-2 weeks of doxycycline, which would have the advantage of . When leg has returned to normal coloration could stop.   -continue LE wraps to reduce lymphedema. If another cellulitis episode occurs in the next 1-2 months, would consider PCN prophylaxis (I typically use 500 Po qday)    DISCUSSION:   Ms. Herr is a 66yo woman with multiple medical problems including chronic lymphedema of the right leg, obesity and diabetes who presented with progressive erythema of the leg/thigh, fevers and chills. The area of erythema progressed and CT imaging showed a deep fluid collection with worsening cellulitis. Despite progression on imaging, WBC and CRP downtrended and her exam was not suggestive of necrotizing fasciitis. Given the severity of her infection, she has required ongoing IV therapy with higher Vancomycin troughs (13-18 mg/L). Her leg continues to improve, albeit slowly. Would continue Ceftriaxone for gram negative coverage given the location of her cellulitis and intertrigo.   As above would stop IV therapy tomorrow, but give PO therapy on d/c to reduce the risk of relapse. I suspect in another 1-2 weeks her leg will be free from erythema, at which time doxy can be discontinued.  Thanks for this consult. ID will sign off, but please page with additional  "questions!  Amy Sandoval MD   of Medicine, Division of Infectious Diseases  [facility] [phone]       CHIEF INFECTIOUS DISEASES COMPLAINT:  Severe cellulitis    INTERVAL HISTORY:   Leg has improved dramatically in the last 2 days. She is feling well and her chief complaint is related to SQ anticoagulation. Good appetite.  ROS: A five-point review of systems was obtained and was negative with the exception of that which is described above.  Allergies   Allergen Reactions     Nkda [No Known Drug Allergies]      Allergies were reviewed.    CURRENT ANTI-INFECTIVES:   Clindamycin 11/4-11/5  Piperacillin-Tazobactam 11/1-11/9  Vancomycin 11/1-present  Cefriaxone 11/9-present    EXAMINATION:   /59  Pulse 88  Temp 96.5  F (35.8  C) (Oral)  Resp 18  Ht 1.67 m (5' 5.75\")  Wt 100.7 kg (222 lb 1.6 oz)  SpO2 94%  BMI 36.12 kg/m2  GENERAL:  Well-developed, well-nourished, laying in bed in no acute distress.  EYES:  Eyes have anicteric sclerae without conjunctival injection.  SKIN:  RLE wrapped with ACE wrap and not examined today, erythema lateral to the knee improving. Now with some xeroderma.   EXT: L AKA  NEUROLOGIC:  Grossly nonfocal. Active x4 extremities.  PSYCH: Appropriate affect. Alert and oriented to person, place and time.  CURRENT LINES: R forearm PIVs x2    NEW DATA/RESULTS:   All interval basic labs, microbiology results and imaging were reviewed.    Culture Micro   Date Value Ref Range Status   11/02/2017 No growth  Final   11/01/2017 No growth  Final   11/01/2017 No growth  Final   10/26/2016 (A)  Final    50,000 to 100,000 colonies/mL Escherichia coli  10,000 to 50,000 colonies/mL Beta hemolytic Streptococcus group B Beta Hemolytic   Streptococcus groups A and B are susceptible to ampicillin, penicillin,   vancomycin, and the cephalosporins.  Susceptibility testing is not routinely   done on these organisms isolated from urine.     06/11/2015 (A)  Final    >100,000 colonies/mL " Escherichia coli  10,000 to 50,000 colonies/mL mixed urogenital shree Susceptibility testing not   routinely done         Recent Labs   Lab Test  11/09/17   0811  11/08/17   0810  11/07/17   0825  11/06/17   0717  11/05/17   0903  11/04/17   0810   CRP  12.0*  15.0*  23.0*  31.0*  50.0*  96.0*     Recent Labs   Lab Test  11/09/17   0811  11/08/17   0810  11/07/17   0825  11/06/17   0717  11/05/17   0903  11/04/17   0810   WBC  7.7  7.9  10.0  8.1  6.1  7.4     Recent Labs   Lab Test  11/11/17   0722  11/10/17   1030  11/09/17   0811  11/08/17   0810   CR  3.26*  3.21*  3.30*  3.12*   GFRESTIMATED  14*  14*  14*  15*       Hematology Studies  Recent Labs   Lab Test  11/10/17   0701  11/09/17   0811  11/08/17   0810  11/07/17   0825  11/06/17   0717  11/05/17   0903  11/04/17   0810   WBC   --   7.7  7.9  10.0  8.1  6.1  7.4   ANEU   --   5.8  5.7  7.1  5.6  4.3  5.4   AEOS   --   0.2  0.2  0.2  0.2  0.2  0.2   HCT   --   29.5*  29.7*  30.2*  27.5*  23.0*  24.6*   PLT  320  346  315  364  333  280  310       Metabolic  Recent Labs   Lab Test  11/11/17   0722  11/10/17   1030  11/09/17   0811   NA  145*  143  143   BUN  32*  30  29   CO2  24  26  25   CR  3.26*  3.21*  3.30*   GFRESTIMATED  14*  14*  14*       Hepatic Studies  Recent Labs   Lab Test  11/07/17   0825  11/01/17   1414  09/21/17   1439   BILITOTAL  0.3  0.3  0.2   ALKPHOS  75  79  71   ALBUMIN  1.7*  1.9*  2.5*   AST  14  7  12   ALT  18  15  17       Immunologlobulins  Recent Labs   Lab Test  11/09/17   0811  11/07/17   0825  11/06/17   0717   03/22/17   0803   IGG   --    --    --    --   1090   IGM   --    --    --    --   147   IGA   --    --    --    --   368   SED  >140*  109*  >140*   < >   --     < > = values in this interval not displayed.

## 2017-11-12 NOTE — PHARMACY-VANCOMYCIN DOSING SERVICE
Pharmacy Vancomycin Note  Date of Service 2017  Patient's  1950   67 year old, female, 100.7 Kg    Indication: Skin and Soft Tissue Infection  Goal Trough Level: 13-18 mg/L, as per ID request  Start Day of Therapy: 2017  Current Vancomycin regimen:  1,500 mg IV q 48 h    Current estimated CrCl = Estimated Creatinine Clearance: 19.7 mL/min (based on Cr of 3.26).    Creatinine for last 3 days  2017:  8:11 AM Creatinine 3.30 mg/dL  11/10/2017: 10:30 AM Creatinine 3.21 mg/dL  2017:  7:22 AM Creatinine 3.26 mg/dL    Recent Vancomycin Levels (past 3 days)  2017:  5:35 PM Vancomycin Level 26.0 mg/L    Vancomycin IV Administrations (past 72 hours)                   vancomycin (VANCOCIN) 1,500 mg in NaCl 0.9 % 250 mL intermittent infusion (mg) 1,500 mg New Bag 17 174                Nephrotoxins and other renal medications (Future)    Start     Dose/Rate Route Frequency Ordered Stop    17  vancomycin place cardoso - receiving intermittent dosing      1 each Does not apply SEE ADMIN INSTRUCTIONS 17 1800  furosemide (LASIX) tablet 80 mg      80 mg Oral 2 TIMES DAILY 17 0629      17 0800  lisinopril (PRINIVIL/ZESTRIL) tablet 40 mg      40 mg Oral DAILY 17 0649               Contrast Orders - past 72 hours     None          Interpretation of levels and current regimen:  Trough level is  Supratherapeutic    Has serum creatinine changed > 50% in last 72 hours: No    Renal Function: Worsening    Plan:  1.  Changed Vancomycin to intermittent dosing.  2.  Pharmacy will check trough levels as appropriate in 1-3 Days and will dose Vancomycin based on levels.  3. Serum creatinine levels will be ordered daily for the first week of therapy and at least twice weekly for subsequent weeks.      Raina Bernard        .

## 2017-11-12 NOTE — PLAN OF CARE
Problem: Patient Care Overview  Goal: Plan of Care/Patient Progress Review  Outcome: Therapy, progress toward functional goals is gradual  BP hypertensive, scheduled BP meds given. Scheduled hydralazine held because DPB <60. OVSS, on room air. Tylenol given for headache this morning, effective. Consistent carb diet, tolerating well in small amounts. BG checks AC/HS, SSI given per orders. Intermittent nausea/upset stomach, Zofran given, helpful. AKA left leg. Lower RLE with lymphedema wrap in place-- cup of water spilled on wrap, wrap removed, PT/lymhedema nurse already gone for the day, to be rewrapped tomorrow. Upper RLE with cellulitis, area of marked redness continuing to recede. Please give Pt an ice pack before heparin injections. Pressure injury to coccyx, mepilex in place. Pt encouraged to reposition frequently. PIV SL between IV abx. Voiding spont, adequate UOP. Passing flatus, smear BM x1. Pericares provided. Daughter and grandson at bedside for part of day, attentive and supportive. Pt seems more forgetful today-- thought she had ordered lunch but she hadn't, forgot that we had already checked her BG, etc. Calls appropriately, no impulsive behavior noted. Worked with OT today. Pt up to chair/commode with SBA.

## 2017-11-12 NOTE — PLAN OF CARE
Problem: Patient Care Overview  Goal: Plan of Care/Patient Progress Review  Outcome: Therapy, progress toward functional goals is gradual  Assumed care of pt from [id number]. AVSS. Pt c/o headache, managed with scheduled tylenol. Denies nausea. On CHO diet with fair appetite. PIV infusing TKO for abx. Coccyx ulcer covered with mepilex. AKA Left leg. Right leg covered with compression wraps. Upper right leg with cellulitis, improving. Up with one assist to the commode. Pt states she does not need assistance repositioning but calls appropriately if she needs help. Passing gas and had a smear during the shift. Possible d/c to TCU on Monday. Continue to monitor.

## 2017-11-12 NOTE — PLAN OF CARE
Problem: Patient Care Overview  Goal: Plan of Care/Patient Progress Review  Outcome: Improving  Pt AVSS. Pt sleeping between cares. No c/o's. Denied any pain.  Sched hydralazine dose held due to diastolic BP below parameters. Pt up to BR with SBA. Gait steady. Right leg wrap intact. PIV saline locked. Cont to monitor right leg.

## 2017-11-12 NOTE — PLAN OF CARE
Problem: Patient Care Overview  Goal: Plan of Care/Patient Progress Review  Discharge Planner OT   Patient plan for discharge: Planning to DC home Monday, daughter (PCA) also in agreement  Current status: Patient SBA/CGA for transfer bed to commode and bed to chair.  Patient declined any further ADLs.  Patient requested edema wrap be removed as well as spilled water on wraps.  Daughter reports she can assist with any ADLs as needed at home, encouraged patient to participate with OT.  Patient and daughter report have all needed AE for DC home, has ramp and wheelchair.  Barriers to return to prior living situation: None  Recommendations for discharge: Home with PCA assist and home OT/PT  Rationale for recommendations: Continue OT while inpatient to improve independence in ADLs/mobility        Entered by: Kimberly Hargrove 11/12/2017 2:11 PM

## 2017-11-12 NOTE — PROGRESS NOTES
[facility]      Date of admission: 11/1/2017  Date of service: 11/12/2017       Assessment and Plan:   Assessment:   This is a 67 year old female with a significant past medical history of chronic kidney disease stage 3, congestive heart failure, uncontrolled diabetes type-2, HTN, HLD, chronic lymphedema, JONE and morbid obesity admitted for RLE cellulitis which has been very slowly improving on IV antibiotics  Patient Active Problem List   Diagnosis     Vitiligo     Traumatic amputation of leg above knee (H)     Contact dermatitis and other eczema, due to unspecified cause     Dermatophytosis of nail     Generalized osteoarthrosis, unspecified site     Hypertension goal BP (blood pressure) < 140/90     Mild nonproliferative diabetic retinopathy (H)     Proteinuria     Stage I pressure ulcer     Hyperlipidemia LDL goal <100     Non compliance with medical treatment     Tobacco abuse     Advanced directives, counseling/discussion     Incontinence of urine     Basal cell carcinoma     Senile nuclear sclerosis     JONE (obstructive sleep apnea)     CHF (congestive heart failure) (H)     Health Care Home     CKD (chronic kidney disease) stage 3, GFR 30-59 ml/min     Type 2 diabetes, HbA1C goal < 8% (H)     Type 2 diabetes mellitus with diabetic chronic kidney disease (H)     Morbid obesity (H)     Type 2 diabetes mellitus with stage 3 chronic kidney disease, without long-term current use of insulin (H)     Moderate recurrent major depression (H)     Type 2 diabetes mellitus with diabetic neuropathy, with long-term current use of insulin (H)     Cellulitis      Plan:    ## Right thigh Cellulitis: Improving slowly  ## Chronic lymphedema: improving  Continue IV ceftriaxone and vancomycin for now, likely to transition to PO abx tomorrow  - Lymphedema wrap of right thigh/leg  - Patient euvoleumic, will contiue PTA diuretics  - ID on board, appreciate recommendations  - Lasix  80 mg BID (PTA)  - Miconazole powder to right groin  - Elevate RLE as tolerated    ## CKD G4-5:   Baseline creatinine 1.8-2.1 from earlier this year, but graphing 1/creatinine vs. Time shows progressive decline of renal function which has been progressing faster over last 12 months. Current GFR (13-16) likely new baseline.  - Continue lisinopril 40mg daily which is renal dosing for creatinine clearance 10-30 (PTA dose 80mg daily)  - Continue tolterodine 2mg for overactive bladder  - Avoid nephrotoxic meds when possible, renally dose medications  - Continue home lasix 80mg BID  - strict I/O's and daily weights       ## Diabetes- type 2: Uncontrolled    Uncontrolled diabetes with last Hgb A1c 9.2 (9/13/17).  Low fasting glucoses this admission, likely related to both medication adherence while hospitalized, improved diet while hospitalized, and poor appetite.  - Home regimen: Lantus 70u at bedtime, Novolog 6U with meals and sliding scale  - Hospital regimen: Lantus 20u at bedtime, increase from LSSI to MSSI  - Hypoglycemia protocol  - Moderate consistent carbohydrate diet     ## HTN: uncontrolled  ## Hyperlipidemia  - Continue PTA carvedilol 25mg BID  - Continue lisinopril (40 mg daily rather than PTA dose of 80mg daily)  - Continue [id number] PO q8h (new medication)   - Continue PTA amlodipine 10mg daily  - Continue furosemide 80mg BID  - Continue PTA Lipitor 20 mg daily   - Add spironolactone 25mg daily for refractory HTN     ##HFpEF  Last echocardiogram 12/2013 with normal LV function but technically difficulty study due to patient's obesity.  HFpEF documented in chart, but no impaired diastolic filling on echo  - Continue carvedilol 25mg BID  - consider repeat echocardiogram as outpatient     ## Normocytic Anemia- Stable  Hgb is stable with baseline ~9-10. She has a history of iron deficiency anemia.   - Consider iron studies and iron supplementation as outpatient      ## Neuropathy  - continue cymbalta 30mg  BID     ##HLD  - Continue lipitor 20mg daily     ## JONE  - Refuses use of CPAP since she is unable to tolerate      ##Stage 1 pressure ulcer on coccyx   - WOC consulted, mepilex dressing to coccyx (patient intermittently declines this dressing)     ## Physical deconditioning  - PT/OT consulted, encourage OOB     Fluids: PO  Electrolytes:Hypernatremia and hyperphosphatemia   Nutrition: Moderate carb control diet  Lines:PIV  Activity: -Up as tolerated, PT, OT  CODE: Full Code  Prophylaxis: Heparin SQ  PCP communication:  - Noam Jackson  - Contacted at admission: No  - Concerns or updates: N/A  Dispo: Expected discharge date 1-2 days pending clinical improvement              Interval History:   Daughter complains of some intermittent confusion regarding time of day and meals recently eaten which have resolved spontaneously.  No other acute events.  Patient complains of mild pain right thigh but it is much improved she says.  Similarly she feels that redness and swelling are a little better as well.  No fevers or chills.             Review of Systems:   Review of Systems   Constitutional: Negative for chills and fever.   Eyes: Negative for visual disturbance.   Respiratory: Negative for cough and shortness of breath.    Cardiovascular: Positive for leg swelling. Negative for chest pain.   Gastrointestinal: Negative for abdominal pain, constipation, diarrhea, nausea and vomiting.   Genitourinary: Negative for dysuria.   Musculoskeletal: Positive for myalgias.   Skin: Positive for rash and wound.   Neurological: Negative for light-headedness and headaches.          Physical Exam (Resident / Clinician):     Vitals were reviewed  Patient Vitals for the past 24 hrs:   BP Temp Temp src Pulse Heart Rate Resp SpO2   11/12/17 0829 178/59 - - - - - -   11/12/17 0600 161/61 96.5  F (35.8  C) Oral 88 - 18 94 %   11/12/17 0000 148/49 - - 81 - - -   11/11/17 2204 167/57 97.2  F (36.2  C) Oral 81 - 16 94 %   11/11/17 1713  148/45 - - - 81 - -   11/11/17 1506 159/44 96.5  F (35.8  C) Oral - 82 20 93 %       Physical Exam   Constitutional: She is oriented to person, place, and time. She appears well-developed and well-nourished. No distress.   HENT:   Head: Normocephalic and atraumatic.   Eyes: Conjunctivae are normal.   Neck: Neck supple.   Cardiovascular: Normal rate and regular rhythm.    Pulmonary/Chest: Effort normal and breath sounds normal. No respiratory distress.   Abdominal: Soft. Bowel sounds are normal. There is no tenderness. There is no rebound and no guarding.   Musculoskeletal: She exhibits no edema (1+ pitting edema RLE, significant swelling right thigh (improved)).   Right thigh erythema markedly improved, decreased area of erythema and lighter hue of pink. No fluctuance or expressible drainage.  S/P Left AKA   Neurological: She is alert and oriented to person, place, and time.   Skin: Skin is warm and dry. She is not diaphoretic.   Psychiatric: She has a normal mood and affect.             Data:   ROUTINE LABS (Last four results)  CMP    Recent Labs  Lab 11/11/17  0722 11/10/17  1030 11/09/17  0811 11/08/17  0810 11/07/17  0825   * 143 143 147* 142   POTASSIUM 4.1 3.2* 3.6 3.6 3.6   CHLORIDE 113* 110* 112* 113* 112*   CO2 24 26 25 24 22   ANIONGAP 8 7 7 10 9   * 200* 108* 65* 50*   BUN 32* 30 29 28 29   CR 3.26* 3.21* 3.30* 3.12* 3.11*   GFRESTIMATED 14* 14* 14* 15* 15*   GFRESTBLACK 17* 17* 17* 18* 18*   JODY 8.6 8.0* 8.3* 8.5 8.8   MAG 2.0 1.8 1.9 2.0 2.0   PHOS 4.6* 4.7* 5.0* 4.6* 4.7*   PROTTOTAL  --   --   --   --  7.4   ALBUMIN  --   --   --   --  1.7*   BILITOTAL  --   --   --   --  0.3   ALKPHOS  --   --   --   --  75   AST  --   --   --   --  14   ALT  --   --   --   --  18     CBC    Recent Labs  Lab 11/10/17  0701 11/09/17  0811 11/08/17  0810 11/07/17  0825 11/06/17  0717   WBC  --  7.7 7.9 10.0 8.1   RBC  --  3.36* 3.37* 3.42* 3.14*   HGB  --  9.3* 9.3* 9.5* 8.7*   HCT  --  29.5* 29.7* 30.2*  27.5*   MCV  --  88 88 88 88   MCH  --  27.7 27.6 27.8 27.7   MCHC  --  31.5 31.3* 31.5 31.6   RDW  --  14.5 14.4 14.4 14.5    346 315 364 333     INRNo lab results found in last 7 days.  CRP    Recent Labs  Lab 11/09/17  0811 11/08/17  0810 11/07/17  0825 11/06/17  0717   CRP 12.0* 15.0* 23.0* 31.0*         Blood culture:  Invalid input(s): BC   Urine culture:  No results for input(s): URC in the last 168 hours.  All cultures:  No results for input(s): CULT in the last 168 hours.    Intake/Output Summary (Last 24 hours) at 11/11/17 1541  Last data filed at 11/11/17 1509   Gross per 24 hour   Intake              120 ml   Output             2150 ml   Net            -2030 ml         Medications:     Current Facility-Administered Medications   Medication     vancomycin (VANCOCIN) 1000 mg in dextrose 5% 200 mL PREMIX     spironolactone (ALDACTONE) tablet 25 mg     insulin aspart (NovoLOG) inj (RAPID ACTING)     insulin aspart (NovoLOG) inj (RAPID ACTING)     cefTRIAXone (ROCEPHIN) 1 g vial to attach to  mL bag for ADULTS or NS 50 mL bag for PEDS     hydrALAZINE (APRESOLINE) tablet 100 mg     furosemide (LASIX) tablet 80 mg     insulin glargine (LANTUS) injection 20 Units     lisinopril (PRINIVIL/ZESTRIL) tablet 40 mg     amLODIPine (NORVASC) tablet 10 mg     benzocaine-menthol (CEPACOL) 15-3.6 MG lozenge 1 lozenge     hydrALAZINE (APRESOLINE) injection 10 mg     loratadine (CLARITIN) tablet 10 mg     miconazole (MICATIN; MICRO GUARD) 2 % powder     lactobacillus rhamnosus (GG) (CULTURELL) capsule 1 capsule     heparin injection 7,500 Units     albuterol (PROAIR HFA/PROVENTIL HFA/VENTOLIN HFA) Inhaler 2 puff     atorvastatin (LIPITOR) tablet 20 mg     carvedilol (COREG) tablet 25 mg     DULoxetine (CYMBALTA) EC capsule 30 mg     naloxone (NARCAN) injection 0.1-0.4 mg     polyethylene glycol (MIRALAX/GLYCOLAX) Packet 17 g     ondansetron (ZOFRAN-ODT) ODT tab 4 mg    Or     ondansetron (ZOFRAN) injection 4 mg      glucose 40 % gel 15-30 g    Or     dextrose 50 % injection 25-50 mL    Or     glucagon injection 1 mg     acetaminophen (TYLENOL) tablet 650 mg       Caring Physician: Brayden Carrasco MD  [facility]  Pager Contact: see Physician sticky note

## 2017-11-12 NOTE — PROVIDER NOTIFICATION
Notified Marquita'kian YEUNG of red diffuse rash from shoulders to hips that does not itch.     Pt. States she was seen.

## 2017-11-12 NOTE — PHARMACY-VANCOMYCIN DOSING SERVICE
Pharmacy Vancomycin Note  Date of Service 2017  Patient's  1950   67 year old, female    Indication: Skin and Soft Tissue Infection  Goal Trough Level: 13-18 mg/L  Day of Therapy: 11  Current Vancomycin regimen:  1500 mg IV q48h    Current estimated CrCl = Estimated Creatinine Clearance: 19.7 mL/min (based on Cr of 3.26).    Creatinine for last 3 days  11/10/2017: 10:30 AM Creatinine 3.21 mg/dL  2017:  7:22 AM Creatinine 3.26 mg/dL    Recent Vancomycin Levels (past 3 days)  2017:  5:35 PM Vancomycin Level 26.0 mg/L    Vancomycin IV Administrations (past 72 hours)                   vancomycin (VANCOCIN) 1,500 mg in NaCl 0.9 % 250 mL intermittent infusion (mg) 1,500 mg New Bag 17 1746                Nephrotoxins and other renal medications (Future)    Start     Dose/Rate Route Frequency Ordered Stop    17 1000  vancomycin (VANCOCIN) 1000 mg in dextrose 5% 200 mL PREMIX      1,000 mg  200 mL/hr over 1 Hours Intravenous EVERY 48 HOURS 17 0821      17 1800  furosemide (LASIX) tablet 80 mg      80 mg Oral 2 TIMES DAILY 17 0629      17 0800  lisinopril (PRINIVIL/ZESTRIL) tablet 40 mg      40 mg Oral DAILY 17 0649               Contrast Orders - past 72 hours     None          Interpretation of levels and current regimen:  Trough level is  Supratherapeutic    Has serum creatinine changed > 50% in last 72 hours: No    Urine output:  diminished urine output    Renal Function: Stable    Plan:  1.  Decrease Dose to 1000 mg q48h. Dose held for 17 hours to allow for clearance.  2.  Pharmacy will check trough levels as appropriate in 1-3 Days. Will likely recheck a level prior to next dose to ensure patient is clearing the drug.   3. Serum creatinine levels will be ordered daily for the first week of therapy and at least twice weekly for subsequent weeks.      Danny Cooley, PharmD, MS  PGY2 Oncology/Hematology Pharmacy Resident          .

## 2017-11-13 ENCOUNTER — APPOINTMENT (OUTPATIENT)
Dept: OCCUPATIONAL THERAPY | Facility: CLINIC | Age: 67
DRG: 603 | End: 2017-11-13
Payer: MEDICARE

## 2017-11-13 VITALS
SYSTOLIC BLOOD PRESSURE: 129 MMHG | OXYGEN SATURATION: 95 % | BODY MASS INDEX: 35.69 KG/M2 | WEIGHT: 222.1 LBS | RESPIRATION RATE: 16 BRPM | HEIGHT: 66 IN | DIASTOLIC BLOOD PRESSURE: 41 MMHG | HEART RATE: 88 BPM | TEMPERATURE: 97.8 F

## 2017-11-13 PROBLEM — L03.115 CELLULITIS OF RIGHT LEG: Status: ACTIVE | Noted: 2017-11-13

## 2017-11-13 LAB
ALBUMIN SERPL-MCNC: 1.8 G/DL (ref 3.4–5)
ALP SERPL-CCNC: 70 U/L (ref 40–150)
ALT SERPL W P-5'-P-CCNC: 37 U/L (ref 0–50)
ANION GAP SERPL CALCULATED.3IONS-SCNC: 8 MMOL/L (ref 3–14)
AST SERPL W P-5'-P-CCNC: 30 U/L (ref 0–45)
BASOPHILS # BLD AUTO: 0 10E9/L (ref 0–0.2)
BASOPHILS NFR BLD AUTO: 0.6 %
BILIRUB SERPL-MCNC: 0.3 MG/DL (ref 0.2–1.3)
BUN SERPL-MCNC: 32 MG/DL (ref 7–30)
CALCIUM SERPL-MCNC: 8.4 MG/DL (ref 8.5–10.1)
CHLORIDE SERPL-SCNC: 112 MMOL/L (ref 94–109)
CO2 SERPL-SCNC: 25 MMOL/L (ref 20–32)
CREAT SERPL-MCNC: 3.34 MG/DL (ref 0.52–1.04)
CRP SERPL-MCNC: 6.7 MG/L (ref 0–8)
DIFFERENTIAL METHOD BLD: ABNORMAL
EOSINOPHIL # BLD AUTO: 0.1 10E9/L (ref 0–0.7)
EOSINOPHIL NFR BLD AUTO: 2.2 %
ERYTHROCYTE [DISTWIDTH] IN BLOOD BY AUTOMATED COUNT: 15.1 % (ref 10–15)
ERYTHROCYTE [SEDIMENTATION RATE] IN BLOOD BY WESTERGREN METHOD: 122 MM/H (ref 0–30)
GFR SERPL CREATININE-BSD FRML MDRD: 14 ML/MIN/1.7M2
GLUCOSE BLDC GLUCOMTR-MCNC: 111 MG/DL (ref 70–99)
GLUCOSE BLDC GLUCOMTR-MCNC: 143 MG/DL (ref 70–99)
GLUCOSE BLDC GLUCOMTR-MCNC: 144 MG/DL (ref 70–99)
GLUCOSE BLDC GLUCOMTR-MCNC: 147 MG/DL (ref 70–99)
GLUCOSE SERPL-MCNC: 92 MG/DL (ref 70–99)
HCT VFR BLD AUTO: 31.8 % (ref 35–47)
HGB BLD-MCNC: 9.7 G/DL (ref 11.7–15.7)
IMM GRANULOCYTES # BLD: 0 10E9/L (ref 0–0.4)
IMM GRANULOCYTES NFR BLD: 0.3 %
LYMPHOCYTES # BLD AUTO: 1.6 10E9/L (ref 0.8–5.3)
LYMPHOCYTES NFR BLD AUTO: 25.5 %
MAGNESIUM SERPL-MCNC: 2 MG/DL (ref 1.6–2.3)
MCH RBC QN AUTO: 27.6 PG (ref 26.5–33)
MCHC RBC AUTO-ENTMCNC: 30.5 G/DL (ref 31.5–36.5)
MCV RBC AUTO: 91 FL (ref 78–100)
MONOCYTES # BLD AUTO: 0.5 10E9/L (ref 0–1.3)
MONOCYTES NFR BLD AUTO: 7.5 %
NEUTROPHILS # BLD AUTO: 4 10E9/L (ref 1.6–8.3)
NEUTROPHILS NFR BLD AUTO: 63.9 %
NRBC # BLD AUTO: 0 10*3/UL
NRBC BLD AUTO-RTO: 0 /100
PHOSPHATE SERPL-MCNC: 4.6 MG/DL (ref 2.5–4.5)
PLATELET # BLD AUTO: 329 10E9/L (ref 150–450)
POTASSIUM SERPL-SCNC: 4.2 MMOL/L (ref 3.4–5.3)
PROT SERPL-MCNC: 7.1 G/DL (ref 6.8–8.8)
RBC # BLD AUTO: 3.51 10E12/L (ref 3.8–5.2)
SODIUM SERPL-SCNC: 144 MMOL/L (ref 133–144)
WBC # BLD AUTO: 6.2 10E9/L (ref 4–11)

## 2017-11-13 PROCEDURE — 83735 ASSAY OF MAGNESIUM: CPT | Performed by: FAMILY MEDICINE

## 2017-11-13 PROCEDURE — 00000146 ZZHCL STATISTIC GLUCOSE BY METER IP

## 2017-11-13 PROCEDURE — 85652 RBC SED RATE AUTOMATED: CPT | Performed by: FAMILY MEDICINE

## 2017-11-13 PROCEDURE — 25000128 H RX IP 250 OP 636: Performed by: FAMILY MEDICINE

## 2017-11-13 PROCEDURE — 25000132 ZZH RX MED GY IP 250 OP 250 PS 637: Mod: GY | Performed by: FAMILY MEDICINE

## 2017-11-13 PROCEDURE — A9270 NON-COVERED ITEM OR SERVICE: HCPCS | Mod: GY | Performed by: FAMILY MEDICINE

## 2017-11-13 PROCEDURE — 85025 COMPLETE CBC W/AUTO DIFF WBC: CPT | Performed by: FAMILY MEDICINE

## 2017-11-13 PROCEDURE — 84100 ASSAY OF PHOSPHORUS: CPT | Performed by: FAMILY MEDICINE

## 2017-11-13 PROCEDURE — 86140 C-REACTIVE PROTEIN: CPT | Performed by: FAMILY MEDICINE

## 2017-11-13 PROCEDURE — 40000133 ZZH STATISTIC OT WARD VISIT

## 2017-11-13 PROCEDURE — 80053 COMPREHEN METABOLIC PANEL: CPT | Performed by: FAMILY MEDICINE

## 2017-11-13 PROCEDURE — 36415 COLL VENOUS BLD VENIPUNCTURE: CPT | Performed by: FAMILY MEDICINE

## 2017-11-13 PROCEDURE — 97140 MANUAL THERAPY 1/> REGIONS: CPT | Mod: GO

## 2017-11-13 RX ORDER — SPIRONOLACTONE 25 MG/1
25 TABLET ORAL DAILY
Qty: 60 TABLET | Refills: 1 | Status: SHIPPED | OUTPATIENT
Start: 2017-11-13 | End: 2018-01-05

## 2017-11-13 RX ORDER — HYDRALAZINE HYDROCHLORIDE 100 MG/1
100 TABLET, FILM COATED ORAL EVERY 8 HOURS
Qty: 180 TABLET | Refills: 1 | Status: SHIPPED | OUTPATIENT
Start: 2017-11-13 | End: 2017-12-13

## 2017-11-13 RX ORDER — LISINOPRIL 40 MG/1
40 TABLET ORAL DAILY
Qty: 60 TABLET | Refills: 1 | Status: ON HOLD | OUTPATIENT
Start: 2017-11-13 | End: 2018-01-26

## 2017-11-13 RX ORDER — LACTOBACILLUS RHAMNOSUS GG 10B CELL
1 CAPSULE ORAL 2 TIMES DAILY
Qty: 60 CAPSULE | Refills: 0 | Status: SHIPPED | OUTPATIENT
Start: 2017-11-13 | End: 2018-05-18

## 2017-11-13 RX ORDER — ACETAMINOPHEN 325 MG/1
650 TABLET ORAL 4 TIMES DAILY
Qty: 100 TABLET
Start: 2017-11-13 | End: 2018-02-06

## 2017-11-13 RX ORDER — DOXYCYCLINE HYCLATE 100 MG
100 TABLET ORAL 2 TIMES DAILY
Qty: 28 TABLET | Refills: 0 | Status: SHIPPED | OUTPATIENT
Start: 2017-11-14 | End: 2017-11-20

## 2017-11-13 RX ADMIN — SPIRONOLACTONE 25 MG: 25 TABLET ORAL at 08:28

## 2017-11-13 RX ADMIN — ATORVASTATIN CALCIUM 20 MG: 20 TABLET, FILM COATED ORAL at 08:28

## 2017-11-13 RX ADMIN — ACETAMINOPHEN 650 MG: 325 TABLET, FILM COATED ORAL at 12:30

## 2017-11-13 RX ADMIN — Medication 1 CAPSULE: at 08:28

## 2017-11-13 RX ADMIN — HYDRALAZINE HYDROCHLORIDE 100 MG: 25 TABLET ORAL at 08:28

## 2017-11-13 RX ADMIN — LISINOPRIL 40 MG: 40 TABLET ORAL at 08:28

## 2017-11-13 RX ADMIN — AMLODIPINE BESYLATE 10 MG: 10 TABLET ORAL at 08:28

## 2017-11-13 RX ADMIN — ACETAMINOPHEN 650 MG: 325 TABLET, FILM COATED ORAL at 07:19

## 2017-11-13 RX ADMIN — DULOXETINE HYDROCHLORIDE 30 MG: 30 CAPSULE, DELAYED RELEASE ORAL at 08:28

## 2017-11-13 RX ADMIN — CARVEDILOL 25 MG: 6.25 TABLET, FILM COATED ORAL at 09:16

## 2017-11-13 RX ADMIN — CEFTRIAXONE 1 G: 1 INJECTION, POWDER, FOR SOLUTION INTRAMUSCULAR; INTRAVENOUS at 12:30

## 2017-11-13 RX ADMIN — FUROSEMIDE 80 MG: 20 TABLET ORAL at 08:28

## 2017-11-13 RX ADMIN — HEPARIN SODIUM 7500 UNITS: 10000 INJECTION, SOLUTION INTRAVENOUS; SUBCUTANEOUS at 06:51

## 2017-11-13 NOTE — PLAN OF CARE
Problem: Patient Care Overview  Goal: Plan of Care/Patient Progress Review  Assumed cares [id number]:  A&Ox4, forgetful. HTN but within parameters, AOVSS on RA. ADA left leg, scabbed underneath - no drainage, per pt came in with this scab. At first had rash all over back from shoulders to hips - rash since improving to upper right half of back. Resident came to assess pt on previous shift but did not write note or place new orders. Up with assist of 1 to commode. Voids adequate. Had 2 soft/formed BMs this shift. PIV TKO between scheduled IV abx. Right leg with compression stocking on, not wrapped. Right thigh with continued erythema and itching - applied lotion with improvement per pt. Wound on coccyx covered with mepilex, CDI, underneath skin a bit purply/reddened, applied barrier cream, no drainage. Pt repositions self frequently in bed. Cont to maintain skin integrity, reinforce pressure relief interventions, cont POC.

## 2017-11-13 NOTE — PLAN OF CARE
Problem: Patient Care Overview  Goal: Plan of Care/Patient Progress Review  Outcome: Therapy, progress toward functional goals as expected  HD12 admitted with cellulitis of the right leg. A&Ox4, VSS on room air, Pt. Is ADA of the left leg, UW1 to the commode, voiding, adequate UO, BM today. Pain is controlled with PO tylenol. Diffuse red rash form shoulders to hips, seen by MD, no comments yet. PIV is at TKO. Blood glucose checks AS, HS. Right leg wound is scabbed, abhijit looking with a compression stocking on. Right thigh is red and inflamed, itches some, no open skin, IV antx scheduled. Wound on coccyx is covered with mepilex, UTV. Plan is to discharge in 1-2 days pending improvement.

## 2017-11-13 NOTE — PROGRESS NOTES
Care Coordinator Discharge Note     Admission Date/Time:  11/1/2017  Attending MD:  Shyam Barcenas MD     Data  Chart reviewed, discussed with interdisciplinary team.   Patient was admitted for: Cellulitis of right leg.    Concerns with insurance coverage for discharge needs: None.  Current Living Situation: Pt lives in a duplex on the bottom floor states her daughter and her SO live on the top floor with her 3 y/o grandson. A ramp is present in front of the home and all needs can be met on the main level. Patients wheelchair is at home, daughter will bring it at d/c.  Support System: Supportive and Involved  Services Involved: PCA (daughter is PCA)  Transportation: daughter will provide transport  Barriers to Discharge: none    Coordination of Care  11/13: Per Medical Team and Infectious Disease patient medically stable for discharge. Patients daughter will transport home as planned. [facility] alerted. Patient has no further questions or concerns at this time.    11/8: Patient continues with moderate to severe cellulitis showing no to slight improvement on a daily basis. Infectious Disease and Surgery were consulted and have advised Primary Team to continue with IV Vanco and Zosyn. Therapies and Lymphedema are following as well. Dispo plan continues to be Home to duplex patient shares with daughter with Home Care Services once patient is deemed medically ready.    11/3: RNCC met with patient at bed side to introduce and explain role in discharge planning.  Patient was unable to recall how many hours of PCA care daughter is authorized to provide nor her county worker information. She requested I speak to her daughter. RNCC provided patient with options for Home Care - patient requested I speak with her daughter to selection an agency and check benfits. RNCC was unable to connect with patients daughter as she was busy as work on a conference call, tried calling again an hour later and was unable to  reach her. Home with on oral antibiotic.    Patient needs Home Care Lymphedema and Home WOC RN, patient does not want HH PT.     11/3@1600 Daughter called and chose [facility]; benefit check called in.    [facility]  Phone  [phone]  Fax  [phone]    RN skilled nursing visit.   RN to assess respiratory and cardiac status, pain level and activity tolerance, wound for increased signs/symptoms of infection, hydration, nutrition and bowel status and home safety.  RN to provide and teach medication management and set-up every week.  RN to provide and teach wound care 2-3 times per week.    OT to provide lymphedema treatment 2-3 times per week    Your provider has ordered home care nursing services.   If you have not been contacted within 2 days of your discharge please call the inpatient department phone number at [phone] .     Plan  Anticipated Discharge Date:  11/13/2017  Anticipated Discharge Plan:  Home with Home Care    CTS Handoff Complete: Yes      Hellen DE LEÓNN RN PHN  Patient Care Management Coordinator for    [facility], [facility], and 5A & 5B Off Service Patients  [facility]  Phone: [phone]  Pager: [phone]

## 2017-11-13 NOTE — PLAN OF CARE
Problem: Patient Care Overview  Goal: Plan of Care/Patient Progress Review  OT 7C: cancel, pt planning to d/c this PM.

## 2017-11-13 NOTE — DISCHARGE SUMMARY
[facility]  Discharge Summary    Supriya Herr MRN# [id number]   Age: 67 year old YOB: 1950     Date of Admission:  11/1/2017  Date of Discharge:  11/13/2017  Admitting Physician:  Augusto Kenney MD  Discharge Physician:  Rigo Dickens MD  Contact: [phone]  Discharging Service:  [facility]     Primary Provider:   Noam Jackson  [address] [place] [id number]  [phone]          Discharge Disposition:   Discharged to home    Upon discharge patient is stable after the recent hospitalization, however he is still requiring ongoing care for strengthening and/or medication assistance.  The patient is temporarily homebound secondary to deconditioning from recent illness. Patient is to be discharged home with the following home health services  Home physical therapy is needed to continue to improve his stamina and strength.       Condition on Discharge:   Discharge condition: Stable   Code status on discharge: Full Code          Admission Diagnoses:   Cellulitis of right leg [L03.115]          Principle Discharge Problem:   Cellulitis of right leg         Additional Discharge Problems:     Patient Active Problem List   Diagnosis     Vitiligo     Traumatic amputation of leg above knee (H)     Contact dermatitis and other eczema, due to unspecified cause     Dermatophytosis of nail     Generalized osteoarthrosis, unspecified site     Hypertension goal BP (blood pressure) < 140/90     Mild nonproliferative diabetic retinopathy (H)     Proteinuria     Stage I pressure ulcer     Hyperlipidemia LDL goal <100     Non compliance with medical treatment     Tobacco abuse     Advanced directives, counseling/discussion     Incontinence of urine     Basal cell carcinoma     Senile nuclear sclerosis     JONE (obstructive sleep apnea)     CHF (congestive heart failure) (H)     Health Care Home     Chronic kidney  disease, stage 4 (severe) (H)     Type 2 diabetes, HbA1C goal < 8% (H)     Type 2 diabetes mellitus with diabetic chronic kidney disease (H)     Morbid obesity (H)     Type 2 diabetes mellitus with stage 3 chronic kidney disease, without long-term current use of insulin (H)     Moderate recurrent major depression (H)     Type 2 diabetes mellitus with diabetic neuropathy, with long-term current use of insulin (H)     Cellulitis     Macular cyst, hole, or pseudohole of retina     Cellulitis of right leg            Medications Prior to Admission:     No current facility-administered medications on file prior to encounter.   Current Outpatient Prescriptions on File Prior to Encounter:  tolterodine (DETROL LA) 2 MG 24 hr capsule TAKE 1 CAPSULE (2 MG) BY MOUTH DAILY   DULoxetine (CYMBALTA) 30 MG EC capsule Take 1 capsule (30 mg) by mouth 2 times daily   furosemide (LASIX) 80 MG tablet Take 1 tablet (80 mg) by mouth 2 times daily Patient taking 80mg in AM, 40mg in PM   atorvastatin (LIPITOR) 20 MG tablet Take 1 tablet (20 mg) by mouth daily   carvedilol (COREG) 25 MG tablet Take 1 tablet (25 mg) by mouth 2 times daily (with meals)   amLODIPine (NORVASC) 10 MG tablet Take 1 tablet (10 mg) by mouth daily   albuterol (PROAIR HFA, PROVENTIL HFA, VENTOLIN HFA) 108 (90 BASE) MCG/ACT inhaler Inhale 2 puffs into the lungs every 6 hours as needed for shortness of breath / dyspnea or wheezing   MULTIVITAMIN OR 1 tablet by mouth daily   order for DME 1 wheelchair   [DISCONTINUED] insulin glargine (LANTUS SOLOSTAR) 100 UNIT/ML injection 70 units at bedtime   blood glucose monitoring (ONE TOUCH ULTRA) test strip Use to test blood sugars 2 times daily or as directed.   [DISCONTINUED] lisinopril (PRINIVIL/ZESTRIL) 40 MG tablet Take 1 tablet (40 mg) by mouth 2 times daily   order for DME Equipment being ordered: TEDS stocking Below the knee 15-20 mgDispense 2Use daily   insulin pen needle (ULTICARE MINI) 31G X 6 MM Use daily or as  directed.   ORDER FOR DME Equipment being ordered: Compression stockings, 20-30 MMHG, knee high   ASPIRIN NOT PRESCRIBED, INTENTIONAL, 1 each continuous prn Antiplatelet medication not prescribed intentionally due to current nosebleeds            Discharge Medications:        Review of your medicines      START taking       Dose / Directions    acetaminophen 325 MG tablet   Commonly known as:  TYLENOL        Dose:  650 mg   Take 2 tablets (650 mg) by mouth 4 times daily   Quantity:  100 tablet   Refills:  0       doxycycline 100 MG tablet   Commonly known as:  VIBRA-TABS        Dose:  100 mg   Start taking on:  11/14/2017   Take 1 tablet (100 mg) by mouth 2 times daily   Quantity:  28 tablet   Refills:  0       hydrALAZINE 100 MG Tabs tablet   Commonly known as:  APRESOLINE   Used for:  Hypertension goal BP (blood pressure) < 140/90        Dose:  100 mg   Take 1 tablet (100 mg) by mouth every 8 hours   Quantity:  180 tablet   Refills:  1       lactobacillus rhamnosus (GG) capsule        Dose:  1 capsule   Take 1 capsule by mouth 2 times daily   Quantity:  60 capsule   Refills:  0       miconazole 2 % powder   Commonly known as:  MICATIN; MICRO GUARD   Used for:  Fungal dermatitis        Apply topically 2 times daily   Quantity:  71 g   Refills:  0       spironolactone 25 MG tablet   Commonly known as:  ALDACTONE   Used for:  Hypertension goal BP (blood pressure) < 140/90        Dose:  25 mg   Take 1 tablet (25 mg) by mouth daily   Quantity:  60 tablet   Refills:  1         CONTINUE these medicines which may have CHANGED, or have new prescriptions. If we are uncertain of the size of tablets/capsules you have at home, strength may be listed as something that might have changed.       Dose / Directions    insulin glargine 100 UNIT/ML injection   Commonly known as:  LANTUS   This may have changed:    - how much to take  - how to take this  - when to take this  - additional instructions   Used for:  Type 2 diabetes  mellitus with diabetic neuropathy, with long-term current use of insulin (H)        Dose:  20 Units   Inject 20 Units Subcutaneous At Bedtime   Quantity:  3 mL   Refills:  1       lisinopril 40 MG tablet   Commonly known as:  PRINIVIL/ZESTRIL   This may have changed:  when to take this   Used for:  Hypertension goal BP (blood pressure) < 140/90        Dose:  40 mg   Take 1 tablet (40 mg) by mouth daily   Quantity:  60 tablet   Refills:  1         CONTINUE these medicines which have NOT CHANGED       Dose / Directions    albuterol 108 (90 BASE) MCG/ACT Inhaler   Commonly known as:  PROAIR HFA/PROVENTIL HFA/VENTOLIN HFA   Used for:  Cough        Dose:  2 puff   Inhale 2 puffs into the lungs every 6 hours as needed for shortness of breath / dyspnea or wheezing   Quantity:  3 Inhaler   Refills:  1       amLODIPine 10 MG tablet   Commonly known as:  NORVASC   Used for:  Essential hypertension with goal blood pressure less than 140/90        Dose:  10 mg   Take 1 tablet (10 mg) by mouth daily   Quantity:  90 tablet   Refills:  3       ASPIRIN NOT PRESCRIBED   Commonly known as:  INTENTIONAL   Used for:  Anemia due to blood loss, acute        Dose:  1 each   1 each continuous prn Antiplatelet medication not prescribed intentionally due to current nosebleeds   Quantity:  0 each   Refills:  0       atorvastatin 20 MG tablet   Commonly known as:  LIPITOR   Used for:  Hyperlipidemia LDL goal <100        Dose:  20 mg   Take 1 tablet (20 mg) by mouth daily   Quantity:  90 tablet   Refills:  3       blood glucose monitoring test strip   Commonly known as:  ONETOUCH ULTRA   Used for:  Type 2 diabetes mellitus with stage 3 chronic kidney disease, without long-term current use of insulin (H)        Use to test blood sugars 2 times daily or as directed.   Quantity:  200 strip   Refills:  3       carvedilol 25 MG tablet   Commonly known as:  COREG   Used for:  Essential hypertension with goal blood pressure less than 140/90         Dose:  25 mg   Take 1 tablet (25 mg) by mouth 2 times daily (with meals)   Quantity:  180 tablet   Refills:  3       DULoxetine 30 MG EC capsule   Commonly known as:  CYMBALTA   Used for:  Type 2 diabetes mellitus with diabetic nephropathy, with long-term current use of insulin (H), Diabetic polyneuropathy associated with diabetes mellitus due to underlying condition (H)        Dose:  30 mg   Take 1 capsule (30 mg) by mouth 2 times daily   Quantity:  180 capsule   Refills:  3       furosemide 80 MG tablet   Commonly known as:  LASIX   Used for:  Lymphedema of both lower extremities        Dose:  80 mg   Take 1 tablet (80 mg) by mouth 2 times daily Patient taking 80mg in AM, 40mg in PM   Quantity:  180 tablet   Refills:  3       insulin pen needle 31G X 6 MM   Commonly known as:  ULTICARE MINI   Used for:  Type 2 diabetes, HbA1c goal < 7% (H)        Use daily or as directed.   Quantity:  100 each   Refills:  prn       MULTIVITAMIN PO        1 tablet by mouth daily   Refills:  0       * order for DME   Used for:  Edema, Hypertension goal BP (blood pressure) < 140/90        Equipment being ordered: Compression stockings, 20-30 MMHG, knee high   Quantity:  1 Device   Refills:  0       * order for DME   Used for:  Localized edema        Equipment being ordered: TEDS stocking  Below the knee 15-20 mg Dispense 2 Use daily   Quantity:  2 Device   Refills:  1       * order for DME   Used for:  CKD (chronic kidney disease) stage 3, GFR 30-59 ml/min, Type 2 diabetes mellitus with stage 3 chronic kidney disease, without long-term current use of insulin (H)        1 wheelchair   Quantity:  1 Device   Refills:  0       tolterodine 2 MG 24 hr capsule   Commonly known as:  DETROL LA   Used for:  Urge incontinence of urine        TAKE 1 CAPSULE (2 MG) BY MOUTH DAILY   Quantity:  60 capsule   Refills:  3       * Notice:  This list has 3 medication(s) that are the same as other medications prescribed for you. Read the directions  carefully, and ask your doctor or other care provider to review them with you.      STOP taking          NovoLOG FLEXPEN 100 UNIT/ML injection   Generic drug:  insulin aspart                Where to get your medicines      These medications were sent to [facility] - [place], [place] - [address]  [address], [facility] [address]     Phone:  [phone]      doxycycline 100 MG tablet     hydrALAZINE 100 MG Tabs tablet     insulin glargine 100 UNIT/ML injection     lisinopril 40 MG tablet     miconazole 2 % powder     spironolactone 25 MG tablet         Some of these will need a paper prescription and others can be bought over the counter. Ask your nurse if you have questions.     Bring a paper prescription for each of these medications      lactobacillus rhamnosus (GG) capsule       You don't need a prescription for these medications      acetaminophen 325 MG tablet                  Discharge Instructions and Follow-Up:   Discharge diet: Diabetic (2000 ADA)   Discharge activity: Activity as tolerated   Discharge follow-up: Follow up with primary care provider in 7 days    Follow up with nephrology in 2-4 weeks    Follow up with endocrinology in 2-4 weeks   Lines and drains: None    Wound care: Lymphedema wrap right lower extremity          Brief Admission History and Evaluation:     Per admission history and physical:    Supriya Herr is a 67 year old female with a significant past medical history of chronic kidney disease stage 3, congestive heart failure, diabetes, HTN, HLD, chronic lymphedema, JONE and morbid obesity who presents with right leg swelling, erythema and warmth for the past 2 days. She reports right lower extremity swelling and weeping of clear drainage for the past week and she has been using compression stockings to help with the swelling with no relief. She has a past medical history of chronic lymphedema and used to wear compression wraps, however has not  followed up with lymphedema recently. She had right upper leg swelling, erythema and warmth that started last night and progressively worsened today. Her daughter reports that she was complaining of right hip pain for the past few days. She had a fever of 103F a few days ago with associated chills. She had abdominal pain, vomiting and nausea that started 6 days ago and lasted a few days. She was nauseous earlier today in the ED and had one episode of non-bilious, non bloody emesis. She lives with her daughter who states that her son was recently ill with a fever as well.      ED course: Vital signs were stable with slight hypertension. CBC showed leukocytosis 18.0 and Hgb 9.4. CMP was remarkable for elevated Cr 2.86, BUN 38 and low albumin 1.9. CRP was elevated at 138 and . Normal lactic acid. Right lower extremity doppler was negative for DVT. Non-contrast CT showed findings consistent with cellulitis of the right thigh. She was started on zosyn, vancomycin and clindamycin in the ED.            Hospital Course/Discharge Plan by Problem:      ## Right thigh Cellulitis: Improving slowly  ## Chronic lymphedema: improving  ## Non-necrotizing fasciitis  Patient was admitted to the general medical floor where she stayed for the remainder of her hospitalization.  She was started on IV vancomycin and zosyn.  She made considerable improvement over the first 12 hours.  Vancomycin was discontinued and she subsequently she started to have worsening erythema, swelling, and pain.  Vancomycin was restarted but no significant improvement was noted for several days.  Venous duplex of the RLE was done which ruled out DVT.  Repeat CT scan of the leg was done which showed edema along the deep facial planes of the thigh.  There was no clinical evidence of necrotizing fasciitis but surgery was still re-consulted to ensure no surgical intervention was needed.  Patient was aggressively diuresed to help with fluid status and lower  extremity swelling which helped swelling and erythema.  ID was consulted due to the atypical course for the patient's skin and soft tissue infection.  Zosyn  Was changed to IV ceftriaxone and vancomycin was continued for MRSA coverage.  By this time the patient's inflammatory markers were normalizing and her WBC had completely normalized and she remained afebrile.  Lymphedema wrap was then used to help with swelling; this resulted in significant improvement in swelling and redness.  Redness and swelling continued very gradual improvement over subsequent days.  The patient completed a 14 day course of IV antibiotics and ID recommended and additonal 2 week course of PO doxycycline to start at discharge.  Patient should continue antibiotics until erythema has resolved.  - Lymphedema wrap of right thigh/leg  - Continue PTA lasix 80mg BID   - Miconazole powder to right groin  - Elevate RLE as tolerated  - Continue doxycycline 100mg BID x 2 weeks, may extend course if redness has not resolved  - If patient develops subsequent infection in next 1-2 months, ID recommends PCN prophylaxis (typically 500mg PO daily)     ## CKD G4-5:   ## Overactive bladder  Baseline creatinine 1.8-2.1 from earlier this year, but graphing 1/creatinine vs. Time shows progressive decline of renal function which has been progressing faster over last 12 months. Current GFR (13-16) likely new baseline.  - Continue lisinopril 40mg daily which is renal dosing for creatinine clearance 10-30 (PTA dose 80mg daily)  - Continue tolterodine 2mg for overactive bladder  - Avoid nephrotoxic meds when possible, renally dose medications  - Continue home lasix 80mg BID  - Follow up with nephrology in 2-4 weeks      ## Diabetes- type 2: Uncontrolled    Uncontrolled diabetes with last Hgb A1c 9.2 (9/13/17).  Patient's PTA lantus dose was 70 units QHS.  Patient was given 35 units QHS on admission and her blood glucose dropped to 30's requiring multiple amps of D50.   During hospital stay lantus dose was adjusted to maintain goal fasting glucose levels .  This dose corresponded to 20 units lantus QHS.  She was additionally needing 2-4 units during the day short acting insulin sliding scale coverage.  The fact that the patient's glucose dropped so severely on 1/2 home dose of lantus raises concern for medication adherence, un-regulated diet, or worsening renal function (or more likely a combination of these factors)  - Home regimen: Lantus 70u at bedtime, Novolog 6U with meals and sliding scale  - Hospital regimen: Lantus 20u at bedtime, MSSI coverage scale  - Discharge regimen: lantus 20 units at bedtime, hold short acting insulin for now  - Moderate consistent carbohydrate diet  - Follow up with PCP and endocrinology for further insulin regimen adjustments  - Patient's daughter to help with diet and medication adherence      ## HTN: uncontrolled  ## Hyperlipidemia  - Continue PTA carvedilol 25mg BID  - Continue lisinopril (40 mg daily rather than PTA dose of 80mg daily) - (new dose)  - Continue [id number] PO q8h (new medication)   - Continue PTA amlodipine 10mg daily  - Continue furosemide 80mg BID  - Continue PTA Lipitor 20 mg daily   - Continue spironolactone 25mg daily for refractory HTN (new medication)      ##HFpEF  Last echocardiogram 12/2013 with normal LV function but technically difficulty study due to patient's obesity.  HFpEF documented in chart, but no impaired diastolic filling on echo  - Continue carvedilol 25mg BID  - consider repeat echocardiogram as outpatient      ## Normocytic Anemia- Stable  Hgb is stable with baseline ~9-10. She has a history of iron deficiency anemia.   - Consider iron studies and iron supplementation as outpatient       ## Neuropathy  - continue cymbalta 30mg BID      ##HLD  - Continue lipitor 20mg daily      ## JONE  - Refuses use of CPAP since she is unable to tolerate       ##Stage 1 pressure ulcer on coccyx   - WOC  consulted, mepilex dressing to coccyx (patient intermittently declined this dressing)      ## Physical deconditioning  - PT/OT consulted, encourage OOB              Final Day of Progress before Discharge:         Interval History:  No acute overnight events.  Patient reports swelling and redness continue to improve slowly.  She did develop macular rash on back yesterday that she says is not painful and not itchy.  No fevers or chills.  Wrap on RLE was slightly too tight and needed to be loosened.  .  Blood glucose controlled with no episodes of hypoglycemia.  BP still elevated but not dangerously so.  No other acute issues        Physical Exam:  Vitals were reviewed  Patient Vitals for the past 24 hrs:   BP Temp Temp src Heart Rate Resp SpO2   11/13/17 1042 129/41 - - - - -   11/13/17 0828 173/65 - - - - -   11/13/17 0712 164/60 97.8  F (36.6  C) Oral 85 16 95 %   11/12/17 2208 157/55 97.8  F (36.6  C) Oral 74 18 97 %   11/12/17 1649 151/57 - - - - -   11/12/17 1539 167/60 97.1  F (36.2  C) Oral 91 16 95 %     Constitutional: She is oriented to person, place, and time. She appears well-developed and well-nourished. No distress.   HENT:   Head: Normocephalic and atraumatic.   Eyes: Conjunctivae are normal.   Neck: Neck supple.   Cardiovascular: Normal rate and regular rhythm.    Pulmonary/Chest: Effort normal and breath sounds normal. No respiratory distress.   Abdominal: Soft. Bowel sounds are normal. There is no tenderness. There is no rebound and no guarding.   Musculoskeletal: She exhibits no edema (trace pitting edema RLE, significant swelling right thigh (improved from prior exams).   Right thigh erythema improvedbut still present, unchanged area of erythema but slightly lighter hue of pink. No fluctuance or expressible drainage.  S/P Left AKA   Neurological: She is alert and oriented to person, place, and time.   Skin: Skin is warm and dry. She is not diaphoretic.   Psychiatric: She has a normal mood and  affect.     I/O last 3 completed shifts:  In: 900 [P.O.:780; I.V.:120]  Out: 2350 [Urine:1550; Other:800]         Data:  All laboratory data reviewed  Results for orders placed or performed during the hospital encounter of 11/01/17 (from the past 24 hour(s))   Glucose by meter   Result Value Ref Range    Glucose 178 (H) 70 - 99 mg/dL   Glucose by meter   Result Value Ref Range    Glucose 144 (H) 70 - 99 mg/dL   Glucose by meter   Result Value Ref Range    Glucose 143 (H) 70 - 99 mg/dL   CBC with platelets differential   Result Value Ref Range    WBC 6.2 4.0 - 11.0 10e9/L    RBC Count 3.51 (L) 3.8 - 5.2 10e12/L    Hemoglobin 9.7 (L) 11.7 - 15.7 g/dL    Hematocrit 31.8 (L) 35.0 - 47.0 %    MCV 91 78 - 100 fl    MCH 27.6 26.5 - 33.0 pg    MCHC 30.5 (L) 31.5 - 36.5 g/dL    RDW 15.1 (H) 10.0 - 15.0 %    Platelet Count 329 150 - 450 10e9/L    Diff Method Automated Method     % Neutrophils 63.9 %    % Lymphocytes 25.5 %    % Monocytes 7.5 %    % Eosinophils 2.2 %    % Basophils 0.6 %    % Immature Granulocytes 0.3 %    Nucleated RBCs 0 0 /100    Absolute Neutrophil 4.0 1.6 - 8.3 10e9/L    Absolute Lymphocytes 1.6 0.8 - 5.3 10e9/L    Absolute Monocytes 0.5 0.0 - 1.3 10e9/L    Absolute Eosinophils 0.1 0.0 - 0.7 10e9/L    Absolute Basophils 0.0 0.0 - 0.2 10e9/L    Abs Immature Granulocytes 0.0 0 - 0.4 10e9/L    Absolute Nucleated RBC 0.0    Magnesium   Result Value Ref Range    Magnesium 2.0 1.6 - 2.3 mg/dL   Phosphorus   Result Value Ref Range    Phosphorus 4.6 (H) 2.5 - 4.5 mg/dL   CRP inflammation   Result Value Ref Range    CRP Inflammation 6.7 0.0 - 8.0 mg/L   Erythrocyte sedimentation rate auto   Result Value Ref Range    Sed Rate 122 (H) 0 - 30 mm/h   Comprehensive metabolic panel   Result Value Ref Range    Sodium 144 133 - 144 mmol/L    Potassium 4.2 3.4 - 5.3 mmol/L    Chloride 112 (H) 94 - 109 mmol/L    Carbon Dioxide 25 20 - 32 mmol/L    Anion Gap 8 3 - 14 mmol/L    Glucose 92 70 - 99 mg/dL    Urea Nitrogen 32 (H)  7 - 30 mg/dL    Creatinine 3.34 (H) 0.52 - 1.04 mg/dL    GFR Estimate 14 (L) >60 mL/min/1.7m2    GFR Estimate If Black 17 (L) >60 mL/min/1.7m2    Calcium 8.4 (L) 8.5 - 10.1 mg/dL    Bilirubin Total 0.3 0.2 - 1.3 mg/dL    Albumin 1.8 (L) 3.4 - 5.0 g/dL    Protein Total 7.1 6.8 - 8.8 g/dL    Alkaline Phosphatase 70 40 - 150 U/L    ALT 37 0 - 50 U/L    AST 30 0 - 45 U/L   Glucose by meter   Result Value Ref Range    Glucose 111 (H) 70 - 99 mg/dL     All cardiac studies reviewed by me.  All imaging studies reviewed by me.         Pending Results:   None      It was a pleasure to participate in the care of Supriya Herr.  If you have questions about her care, please do not hesitate to contact the [facility] team via the hospital masters on which we are stationed.      [facility]  [facility], [address] - [phone]

## 2017-11-13 NOTE — PLAN OF CARE
Problem: Patient Care Overview  Goal: Plan of Care/Patient Progress Review  Edema/7C: Pt with comperm donned, wraps removed. Pt presenting with trace 1+ pitting distally, 2+ pitting from knee crease throughout thigh. Skin cares performed prior to reapplication of GCB with sween 24 from MTPs to knee crease for continued edema management and protection of skin integrity. Okay to wear x 48 hours until therapist returns. Please remove compression if causing numbness, tingling, pain, or becomes soiled.     Recommend OP/HH lymphedema services to address continued needs with wrapping or sizing of garments for long term edema management.

## 2017-11-13 NOTE — PLAN OF CARE
Problem: Patient Care Overview  Goal: Plan of Care/Patient Progress Review  Outcome: No Change  VSS, forgetful at times. Pain controlled with scheduled Tylenol. Denies nausea. Right leg with erythema, OT changed lymphedema wrap. Coccyx Mepilex CDI. Tolerating CHO diet. Up with assist of 1 to commode. Left AKA. Went over AVS with pt, no questions at this time. Daughter picking pt up at 1430. Removed PIV. Adequate for discharge.

## 2017-11-14 ENCOUNTER — TELEPHONE (OUTPATIENT)
Dept: FAMILY MEDICINE | Facility: CLINIC | Age: 67
End: 2017-11-14

## 2017-11-14 ENCOUNTER — CARE COORDINATION (OUTPATIENT)
Dept: CARE COORDINATION | Facility: CLINIC | Age: 67
End: 2017-11-14

## 2017-11-14 NOTE — PROGRESS NOTES
Patient was discharged from [facility] on 11/13            [facility]  Discharge Summary     Supriya Herr MRN# [id number]   Age: 67 year old YOB: 1950      Date of Admission:                                      11/1/2017  Date of Discharge:                                      11/13/2017  Admitting Physician:                                   Augusto Kenney MD  Discharge Physician:                                  Rigo Dickens MD  Contact: [phone]  Discharging Service:                                   [facility]

## 2017-11-14 NOTE — TELEPHONE ENCOUNTER
Reason for call:  Order   Order or referral being requested: jessie kelley/ charito meyer is requesting orders for Skilled nursing 2 times a week for 4 weeks, pt, ot and  for 1 time for evaluation  Reason for request: pain management  Date needed: as soon as possible  Has the patient been seen by the PCP for this problem? YES    Additional comments: n/a      Phone number to reach patient:  [phone]    Best Time:  n/a    Can we leave a detailed message on this number?  YES

## 2017-11-14 NOTE — TELEPHONE ENCOUNTER
Left detailed message with home care orders per protocol.     Carla Field, BSN RN  [facility]

## 2017-11-14 NOTE — PLAN OF CARE
Problem: Patient Care Overview  Goal: Plan of Care/Patient Progress Review  Occupational Therapy Discharge Summary     Reason for therapy discharge:    Discharged to home.     Progress towards therapy goal(s). See goals on Care Plan in [facility] electronic health record for goal details.  Goals partially met.  Barriers to achieving goals:   discharge from facility.     Therapy recommendation(s):    Continued therapy is recommended.  Rationale/Recommendations:  Pt could benefit from home OT for safety eval, otherwise continue with assist from daughter for heavier ADLs/IADLs. .

## 2017-11-16 ENCOUNTER — OFFICE VISIT (OUTPATIENT)
Dept: ENDOCRINOLOGY | Facility: CLINIC | Age: 67
End: 2017-11-16

## 2017-11-16 VITALS — HEART RATE: 76 BPM | SYSTOLIC BLOOD PRESSURE: 132 MMHG | DIASTOLIC BLOOD PRESSURE: 68 MMHG

## 2017-11-16 DIAGNOSIS — E11.65 TYPE 2 DIABETES MELLITUS WITH HYPERGLYCEMIA, WITH LONG-TERM CURRENT USE OF INSULIN (H): Primary | ICD-10-CM

## 2017-11-16 DIAGNOSIS — Z79.4 TYPE 2 DIABETES MELLITUS WITH HYPERGLYCEMIA, WITH LONG-TERM CURRENT USE OF INSULIN (H): Primary | ICD-10-CM

## 2017-11-16 RX ORDER — INSULIN ASPART 100 [IU]/ML
INJECTION, SOLUTION INTRAVENOUS; SUBCUTANEOUS
Qty: 15 ML | Refills: 3 | Status: SHIPPED | OUTPATIENT
Start: 2017-11-16 | End: 2017-12-15

## 2017-11-16 ASSESSMENT — PAIN SCALES - GENERAL: PAINLEVEL: NO PAIN (0)
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
## 2017-11-16 NOTE — MR AVS SNAPSHOT
After Visit Summary   11/16/2017    Supriya Herr    MRN: [id number]           Patient Information     Date Of Birth          1950        Visit Information        Provider Department      11/16/2017 11:00 AM Arabella Kamara PA-C [facility] Endocrinology        Today's Diagnoses     Type 2 diabetes mellitus with hyperglycemia, with long-term current use of insulin (H)    -  1      Care Instructions    1. Check your blood sugar fasting each am and before lunch and before dinner DAILY.  2.  Continue Lantus 20 units at bedtime.  3.  Start Novolog 2 units with breakfast,lunch and dinner.  4.  Call me in 1 week with blood sugar data.  5.  See Podiatry.  6.  See me the week before [place].  Arabella Kamara PA-C    To get in contact with our Endocrine Nurse's:     Kerri   Phone: [phone]       Pager: [phone]    Karrie  Phone: [phone]                  Phone: [phone]       Pager: [phone]    *some nurses are not in everyday but voicemail's will be checked daily*            Follow-ups after your visit        Your next 10 appointments already scheduled     Nov 20, 2017  1:00 PM CST   Office Visit with Noam Jackson MD   [facility] ([facility])    [address] [place] [id number] [phone]           Bring a current list of meds and any records pertaining to this visit. For Physicals, please bring immunization records and any forms needing to be filled out. Please arrive 10 minutes early to complete paperwork.            Dec 13, 2017  2:00 PM CST   Lab with  LAB   [facility] ([facility])    [address] [place] [address]  [address]st [facility] [address] [phone]            Dec 13, 2017  2:50 PM CST   (Arrive by 2:20 PM)   Return Visit with Kathryn Basilio MD   [facility] Nephrology ([facility])    [address] [place] [address]  3rd [facility]  [address]   [phone]            Dec 22, 2017  1:40 PM CST   (Arrive by 1:25 PM)   RETURN FOOT/ANKLE with Eleazar Pack DPM   [facility] ([facility])    [address] [place] [address]  4th [facility] [address]   [phone]            Dec 22, 2017  2:30 PM CST   (Arrive by 2:15 PM)   RETURN DIABETES with Arabella Kamara PA-C   [facility] End[facility] ([facility])    [address] [place] [address]  3rd [facility] [id number] [phone]            Jan 09, 2018 10:30 AM CST   [facility]   [facility]    [address] [place] [id number] [phone]           Please do not eat 10-12 hours before your appointment if you are coming in fasting for labs on lipids, cholesterol, or glucose (sugar). This does not apply to pregnant women. Water, hot tea and black coffee (with nothing added) are okay. Do not drink other fluids, diet soda or chew gum.            Jan 09, 2018 11:00 AM CST   [facility]am [facility] MD   [facility]    [address] [place] [id number] [phone]           Bring a current list of meds and any records pertaining to this visit. For Physicals, please bring immunization records and any forms needing to be filled out. Please arrive 10 minutes early to complete paperwork.              Who to contact     Please call your clinic at [phone] to:    Ask questions about your health    Make or cancel appointments    Discuss your medicines    Learn about your test results    Speak to your doctor   If you have compliments or concerns about an experience at your clinic, or if you wish to file a complaint, please contact [facility] Patient Relations at [phone] or email us at [email or web address]          Additional Information About Your Visit        [employer] Information     [employer] is an electronic gateway that provides easy, online access to your medical records. With [employer], you can request a clinic appointment, read your test results, renew a prescription or communicate with your care team.     To sign up for [employer] visit the website at [email or web address]   You will be asked to enter the access code listed below, as well as some personal information. Please follow the directions to create your username and password.     Your access code is: [email or web address]  Expires: 12/10/2017 12:38 PM     Your access code will  in 90 days. If you need help or a new code, please contact your [facility] or call [phone] for assistance.        Care EveryWhere ID     This is your Care EveryWhere ID. This could be used by other organizations to access your [facility] medical records  [id number]        Your Vitals Were     Pulse                   76            Blood Pressure from Last 3 Encounters:   17 132/68   17 129/41   17 137/61    Weight from Last 3 Encounters:   17 100.7 kg (222 lb 1.6 oz)   17 101.6 kg (224 lb)   17 100.2 kg (220 lb 14.4 oz)              Today, you had the following     No orders found for display         Today's Medication Changes          These changes are accurate as of: 17 12:00 PM.  If you have any questions, ask your nurse or doctor.               Start taking these medicines.        Dose/Directions    NovoLOG FLEXPEN 100 UNIT/ML injection   Used for:  Type 2 diabetes mellitus with hyperglycemia, with long-term current use of insulin (H)   Generic drug:  insulin aspart        Inject 2 units SQ with breakfast, lunch and dinner.   Quantity:  15 mL   Refills:  3         These medicines have changed or have updated prescriptions.        Dose/Directions    * blood glucose monitoring test strip   Commonly known as:   ONETOUCH ULTRA   This may have changed:  Another medication with the same name was added. Make sure you understand how and when to take each.   Used for:  Type 2 diabetes mellitus with stage 3 chronic kidney disease, without long-term current use of insulin (H)        Use to test blood sugars 2 times daily or as directed.   Quantity:  200 strip   Refills:  3       * blood glucose monitoring test strip   Commonly known as:  ONETOUCH ULTRA   This may have changed:  You were already taking a medication with the same name, and this prescription was added. Make sure you understand how and when to take each.   Used for:  Type 2 diabetes mellitus with hyperglycemia, with long-term current use of insulin (H)        Test your blood sugar 3-4 times per day.   Quantity:  200 strip   Refills:  3       * Notice:  This list has 2 medication(s) that are the same as other medications prescribed for you. Read the directions carefully, and ask your doctor or other care provider to review them with you.         Where to get your medicines      These medications were sent to [facility]/pharmacy #[id number] - [place], [place] - [address] [facility]  [address] [facility] [address]     Phone:  [phone]     blood glucose monitoring test strip    NovoLOG FLEXPEN 100 UNIT/ML injection                Primary Care Provider Office Phone # Fax #    Noam Luis Jackson [phone]       [address] [facility] [address]  [facility] [address]        Equal Access to Services     KALYANI WARREN : Emeterio jorgeo Soaleksandar, waaxda luqadaha, qaybta kaalmada ademaria alejandra, madeline chaudhari. So [facility] [phone].    ATENCIÓN: Si habla español, tiene a santoro disposición servicios gratuitos de asistencia lingüística. Llame al [phone].    We comply with applicable federal civil rights laws and [place] laws. We do not discriminate on the basis of race, color, national origin, age, disability, sex, sexual  orientation, or gender identity.            Thank you!     Thank you for choosing [facility] ENDOCRINOLOG[facility]  for your care. Our goal is always to provide you with excellent care. Hearing back from our patients is one way we can continue to improve our services. Please take a few minutes to complete the written survey that you may receive in the mail after your visit with us. Thank you!             Your Updated Medication List - Protect others around you: Learn how to safely use, store and throw away your medicines at www.disposemymeds.org.          This list is accurate as of: 11/16/17 12:00 PM.  Always use your most recent med list.                   Brand Name Dispense Instructions for use Diagnosis    acetaminophen 325 MG tablet    TYLENOL    100 tablet    Take 2 tablets (650 mg) by mouth 4 times daily    Cellulitis of right leg       albuterol 108 (90 BASE) MCG/ACT Inhaler    PROAIR HFA/PROVENTIL HFA/VENTOLIN HFA    3 Inhaler    Inhale 2 puffs into the lungs every 6 hours as needed for shortness of breath / dyspnea or wheezing    Cough       amLODIPine 10 MG tablet    NORVASC    90 tablet    Take 1 tablet (10 mg) by mouth daily    Essential hypertension with goal blood pressure less than 140/90       ASPIRIN NOT PRESCRIBED    INTENTIONAL    0 each    1 each continuous prn Antiplatelet medication not prescribed intentionally due to current nosebleeds    Anemia due to blood loss, acute       atorvastatin 20 MG tablet    LIPITOR    90 tablet    Take 1 tablet (20 mg) by mouth daily    Hyperlipidemia LDL goal <100       * blood glucose monitoring test strip    ONETOUCH ULTRA    200 strip    Use to test blood sugars 2 times daily or as directed.    Type 2 diabetes mellitus with stage 3 chronic kidney disease, without long-term current use of insulin (H)       * blood glucose monitoring test strip    ONETOUCH ULTRA    200 strip    Test your blood sugar 3-4 times per day.    Type 2 diabetes mellitus with hyperglycemia,  with long-term current use of insulin (H)       carvedilol 25 MG tablet    COREG    180 tablet    Take 1 tablet (25 mg) by mouth 2 times daily (with meals)    Essential hypertension with goal blood pressure less than 140/90       doxycycline 100 MG tablet    VIBRA-TABS    28 tablet    Take 1 tablet (100 mg) by mouth 2 times daily    Cellulitis of right leg       DULoxetine 30 MG EC capsule    CYMBALTA    180 capsule    Take 1 capsule (30 mg) by mouth 2 times daily    Type 2 diabetes mellitus with diabetic nephropathy, with long-term current use of insulin (H), Diabetic polyneuropathy associated with diabetes mellitus due to underlying condition (H)       furosemide 80 MG tablet    LASIX    180 tablet    Take 1 tablet (80 mg) by mouth 2 times daily Patient taking 80mg in AM, 40mg in PM    Lymphedema of both lower extremities       hydrALAZINE 100 MG Tabs tablet    APRESOLINE    180 tablet    Take 1 tablet (100 mg) by mouth every 8 hours    Hypertension goal BP (blood pressure) < 140/90       insulin glargine 100 UNIT/ML injection    LANTUS    3 mL    Inject 20 Units Subcutaneous At Bedtime    Type 2 diabetes mellitus with diabetic neuropathy, with long-term current use of insulin (H)       insulin pen needle 31G X 6 MM    ULTICARE MINI    100 each    Use daily or as directed.    Type 2 diabetes, HbA1c goal < 7% (H)       lactobacillus rhamnosus (GG) capsule     60 capsule    Take 1 capsule by mouth 2 times daily    Cellulitis of right leg       lisinopril 40 MG tablet    PRINIVIL/ZESTRIL    60 tablet    Take 1 tablet (40 mg) by mouth daily    Hypertension goal BP (blood pressure) < 140/90       miconazole 2 % powder    MICATIN; MICRO GUARD    71 g    Apply topically 2 times daily    Fungal dermatitis       MULTIVITAMIN PO      1 tablet by mouth daily        NovoLOG FLEXPEN 100 UNIT/ML injection   Generic drug:  insulin aspart     15 mL    Inject 2 units SQ with breakfast, lunch and dinner.    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, unspecified laterality, subsequent encounter (H), CKD (chronic kidney disease) stage 3, GFR 30-59 ml/min, Type 2 diabetes mellitus with stage 3 chronic kidney disease, without long-term current use of insulin (H)       spironolactone 25 MG tablet    ALDACTONE    60 tablet    Take 1 tablet (25 mg) by mouth daily    Hypertension goal BP (blood pressure) < 140/90       tolterodine 2 MG 24 hr capsule    DETROL LA    60 capsule    TAKE 1 CAPSULE (2 MG) BY MOUTH DAILY    Urge incontinence of urine       * Notice:  This list has 5 medication(s) that are the same as other medications prescribed for you. Read the directions carefully, and ask your doctor or other care provider to review them with you.

## 2017-11-16 NOTE — PATIENT INSTRUCTIONS
1. Check your blood sugar fasting each am and before lunch and before dinner DAILY.  2.  Continue Lantus 20 units at bedtime.  3.  Start Novolog 2 units with breakfast,lunch and dinner.  4.  Call me in 1 week with blood sugar data.  5.  See Podiatry.  6.  See me the week before [place].  Arabella Kamara PA-C    To get in contact with our Endocrine Nurse's:     Kerri   Phone: [phone]       Pager: [phone]    Karrie  Phone: [phone]                  Phone: [phone]       Pager: [phone]    *some nurses are not in everyday but voicemail's will be checked daily*

## 2017-11-17 ENCOUNTER — TELEPHONE (OUTPATIENT)
Dept: FAMILY MEDICINE | Facility: CLINIC | Age: 67
End: 2017-11-17

## 2017-11-17 NOTE — TELEPHONE ENCOUNTER
Verbal ok for skilled home care orders given to Sara PT per RN protocol    Viridiana Sprague, RN   [facility]

## 2017-11-17 NOTE — PROGRESS NOTES
SUBJECTIVE:   Supriya Herr is a 67 year old female who presents to clinic today for the following health issues:    Hospital Follow-up Visit:    Hospital/Nursing Home/IP Rehab Facility: [facility]  Date of Admission: 11/1/17  Date of Discharge: 11/13/17  Reason(s) for Admission: Cellulitis             Problems taking medications regularly:  Yes       Medication changes since discharge: Yes- 2 units of novolog before meals        Problems adhering to non-medication therapy:  None    Summary of hospitalization:  [facility] discharge summary reviewed  Diagnostic Tests/Treatments reviewed.  Follow up needed: with nephrology  Other Healthcare Providers Involved in Patient s Care:         Homecare and Specialist appointment - in december  Update since discharge: improved. But lives with daughter who works full time, needs more home care help   OT [place] see her tomorrow, trying to get new wheelchair    Post Discharge Medication Reconciliation: discharge medications reconciled, continue medications without change.  Plan of care communicated with patient, family and caregiver     Coding guidelines for this visit:  Type of Medical   Decision Making Face-to-Face Visit       within 7 Days of discharge Face-to-Face Visit        within 14 days of discharge   Moderate Complexity [id number]   High Complexity [id number]          having some tension type HA in temples    Diabetes Follow-up           Diabetic concerns: None     Symptoms of hypoglycemia (low blood sugar): none     Paresthesias (numbness or burning in feet) or sores: Yes chronic     Date of last diabetic eye exam: 2016, is due    Chronic Kidney Disease Follow-up      Current NSAID use?  no    seing Nephrology in 4 weeks          Problem list and histories reviewed & adjusted, as indicated.  Additional history: as documented    Labs reviewed in EPIC    Reviewed and updated as needed this visit by clinical staff       Reviewed and  updated as needed this visit by Provider         ROS:  Constitutional, HEENT, cardiovascular, pulmonary, gi and gu systems are negative, except as otherwise noted.      OBJECTIVE:   /57 (BP Location: Right arm, Patient Position: Chair, Cuff Size: Adult Large)  Pulse 67  Temp 97.5  F (36.4  C) (Oral)  SpO2 96%  There is no height or weight on file to calculate BMI.  GENERAL: alert, frail and fatigued  NECK: no adenopathy, no asymmetry, masses, or scars and thyroid normal to palpation  RESP: lungs clear to auscultation - no rales, rhonchi or wheezes  CV: regular rates and rhythm, normal S1 S2, no S3 or S4 and trACE+ right lower extremity pitting edema to shin    ABDOMEN: soft, nontender, no hepatosplenomegaly, no masses and bowel sounds normal  SKIN: erythematous sloightly red/ not warm patch right thigh -   NEURO: weakness of leg/ arms and mentation intact  PSYCH: mentation appears normal, tangential, anxious, fatigued and judgement and insight intact    Diagnostic Test Results:  Results for orders placed or performed during the hospital encounter of 11/01/17   US Lower Extremity Venous Duplex Right    Narrative    US LOWER EXTREMITY VENOUS DUPLEX RIGHT11/1/2017 3:24 PM    HISTORY: Pain and swelling, evaluate for DVT.    TECHNIQUE:  Venous Doppler US.? Color flow and spectral Doppler with  waveform analysis performed.    FINDINGS: There is edema or thin ill-defined fluid at the lateral  aspect of the right thigh. The peroneal veins are not well visualized.  No evidence of lower extremity deep venous thrombosis.       Impression    IMPRESSION: No DVT identified.    DAPHNEY STEVEN MD   CT Hip Right w/o Contrast    Narrative    CT HIP RIGHT WITHOUT CONTRAST 11/1/2017 5:02 PM     COMPARISON: None.    HISTORY: Rapidly spreading cellulitis of right lower extremity in the  thigh.  Evaluate for necrotizing fasciitis.    TECHNIQUE: Thin-section axial non-contrast CT images of the right  thigh were acquired. Multiplanar  reconstructions were created.    FINDINGS: There are ORIF changes to the right acetabulum. There are  severe degenerative changes of the right hip joint. There are no  fractures of the visualized bones. No aggressive bony lesions noted.    There is increased density in the subcutaneous fat of the posterior  and lateral right thigh extending from the level of the greater  trochanter down to the inferior most image. These findings are  consistent with cellulitis. There is no evidence for fluid collection  or abscess anywhere in the right lower extremity. There is no  subcutaneous gas. There is no evidence for fluid or thickening within  the fascia around the muscular compartments. There is no evidence for  fluid or inflammation within the muscular compartments.      Impression    IMPRESSION: Findings consistent with cellulitis of the right thigh. No  findings to suggest fasciitis. No fractures.      Radiation dose for this scan was reduced using automated exposure  control, adjustment of the mA and/or kV according to patient size, or  iterative reconstruction technique    POORNIMA BOLAÑOS MD   US Lower Extremity Venous Duplex Right Port    Narrative    EXAMINATION: DOPPLER VENOUS ULTRASOUND OF THE RIGHT LOWER EXTREMITY,  11/6/2017 1:25 AM     COMPARISON: 1/1/2017    HISTORY: Right leg swelling    TECHNIQUE:  Gray-scale evaluation with compression, spectral flow, and  color Doppler assessment of the deep venous system of the right leg  from groin to knee, and then at the ankle.    FINDINGS:  In the right lower extremity, the common femoral, femoral, popliteal  and posterior tibial veins demonstrate normal compressibility and  blood flow. The left common femoral vein imaged has a comparison  demonstrates normal compressibility and blood flow.      Impression    IMPRESSION:  No evidence of right lower extremity deep venous thrombosis.    I have personally reviewed the examination and initial interpretation  and I agree  with the findings.    TOMY JIMENEZ MD   CT Femur Right w/o Contrast    Narrative    CT right thigh without contrast    History: Worsening right thigh swelling, redness and pain despite  antibiotics for cellulitis. Concern for fluid collection.    Techniques: Helical acquisition of images through the right thigh was  obtained without administration of contrast.    DLP: 1596 mGy-cm    Comparison: CT November 1, 2017.    Findings:    There is subcutaneous soft tissue stranding involving the superficial  fascial layer extending down to level deep fascial layer. Overall  these findings have progressed since comparison study. There is new  area of fluid attenuation involving the right anterior thigh  compartment along the lateral aspect of the vastus lateralis muscle,  approximately 12 mm in thickness, located 29 cm proximal to the knee  joint for the craniocaudal length of 18 cm (image 54 series 5). There  is also area of new subfascial fluid posteriorly (image 176 series 2  for instance). No subcutaneous emphysema.    Degenerative changes of the knee. Partial visualization of the  surgical hardware in the right iliac bone extending to the acetabulum.  No evidence of fracture, suspicious osteolysis.    There is severe degenerative change of right hip with extensive bony  proliferative change. There is suspect a small hip joint effusion,  likely degenerative. Internal judgment of joints are not well assessed  with CT technique. Small knee joint effusion is present.    Vascular calcifications. There are multiple right inguinal, external  iliac chain lymphadenopathy, similar to prior and likely reactive.  Pelvic phlebolith.      Impression    Impression:  1. Overall progression of soft tissue infection in the right thigh,  which now has area of fluid attenuation deep to the deep fascial layer  along the lateral margin of vastus lateralis muscle (12 mm thickness,  18 cm in craniocaudal dimension) as well as new area of  subfascial  fluid posteriorly. Primary differential in the provided clinical  setting is either necrotizing or non-necrotizing soft tissue infection  including fasciitis.    Findings were communicated to Dr. Carrasco by Dr. Lou at 7:44 PM.    KIMBERLEE LOU   US Renal Complete    Narrative    EXAMINATION: Renal ultrasound, 11/6/2017 12:51 AM     COMPARISON: 10/13/2016    HISTORY: Renal failure    FINDINGS:    Right kidney: Measures 13.1 cm in length. Mildly thinned, echogenic  renal cortex. Unchanged 1.2 cm cortical cyst. No focal mass. No  hydronephrosis.    Left kidney: Measures 13.1 cm in length. Mildly thin, echogenic renal  cortex. No focal mass. No hydronephrosis. 1.5 cm cortical cyst.    Bladder: Unremarkable.      Impression    IMPRESSION:  1. Thin, echogenic renal cortices consistent with medical renal  disease.  2. No hydronephrosis.    I have personally reviewed the examination and initial interpretation  and I agree with the findings.    TOMY JIMENEZ MD   US Extremity Non Vascular Right    Narrative    Exam: Soft tissue ultrasound, 11/6/2017 1:25 AM    Indication: Evaluate for fluid collection    Comparison: CT on 11/5/2017    Findings:   Soft tissue ultrasound of the right thigh. Marked edema along the  lateral thigh. No definite drainable fluid collection. The mass.      Impression    Impression: Marked edema along the right lateral thigh without  appreciable drainable fluid collection.    I have personally reviewed the examination and initial interpretation  and I agree with the findings.    AUGUSTUS FAN MD   CBC with platelets differential   Result Value Ref Range    WBC 18.0 (H) 4.0 - 11.0 10e9/L    RBC Count 3.32 (L) 3.8 - 5.2 10e12/L    Hemoglobin 9.4 (L) 11.7 - 15.7 g/dL    Hematocrit 28.7 (L) 35.0 - 47.0 %    MCV 86 78 - 100 fl    MCH 28.3 26.5 - 33.0 pg    MCHC 32.8 31.5 - 36.5 g/dL    RDW 13.5 10.0 - 15.0 %    Platelet Count 269 150 - 450 10e9/L    Diff Method Automated Method     %  Neutrophils 86.3 %    % Lymphocytes 7.4 %    % Monocytes 5.4 %    % Eosinophils 0.4 %    % Basophils 0.1 %    % Immature Granulocytes 0.4 %    Nucleated RBCs 0 0 /100    Absolute Neutrophil 15.5 (H) 1.6 - 8.3 10e9/L    Absolute Lymphocytes 1.3 0.8 - 5.3 10e9/L    Absolute Monocytes 1.0 0.0 - 1.3 10e9/L    Absolute Eosinophils 0.1 0.0 - 0.7 10e9/L    Absolute Basophils 0.0 0.0 - 0.2 10e9/L    Abs Immature Granulocytes 0.1 0 - 0.4 10e9/L    Absolute Nucleated RBC 0.0    Comprehensive metabolic panel   Result Value Ref Range    Sodium 139 133 - 144 mmol/L    Potassium 3.6 3.4 - 5.3 mmol/L    Chloride 106 94 - 109 mmol/L    Carbon Dioxide 24 20 - 32 mmol/L    Anion Gap 9 3 - 14 mmol/L    Glucose 245 (H) 70 - 99 mg/dL    Urea Nitrogen 38 (H) 7 - 30 mg/dL    Creatinine 2.86 (H) 0.52 - 1.04 mg/dL    GFR Estimate 16 (L) >60 mL/min/1.7m2    GFR Estimate If Black 20 (L) >60 mL/min/1.7m2    Calcium 8.4 (L) 8.5 - 10.1 mg/dL    Bilirubin Total 0.3 0.2 - 1.3 mg/dL    Albumin 1.9 (L) 3.4 - 5.0 g/dL    Protein Total 7.2 6.8 - 8.8 g/dL    Alkaline Phosphatase 79 40 - 150 U/L    ALT 15 0 - 50 U/L    AST 7 0 - 45 U/L   CRP inflammation   Result Value Ref Range    CRP Inflammation 138.0 (H) 0.0 - 8.0 mg/L   Erythrocyte sedimentation rate auto   Result Value Ref Range    Sed Rate 119 (H) 0 - 30 mm/h   Lactic acid   Result Value Ref Range    Lactic Acid 0.6 (L) 0.7 - 2.0 mmol/L   Glucose by meter   Result Value Ref Range    Glucose 197 (H) 70 - 99 mg/dL   Platelet count   Result Value Ref Range    Platelet Count 251 150 - 450 10e9/L   Creatinine   Result Value Ref Range    Creatinine 2.82 (H) 0.52 - 1.04 mg/dL    GFR Estimate 17 (L) >60 mL/min/1.7m2    GFR Estimate If Black 20 (L) >60 mL/min/1.7m2   UA with Microscopic reflex to Culture   Result Value Ref Range    Color Urine Light Yellow     Appearance Urine Clear     Glucose Urine 300 (A) NEG^Negative mg/dL    Bilirubin Urine Negative NEG^Negative    Ketones Urine Negative NEG^Negative  mg/dL    Specific Gravity Urine 1.010 1.003 - 1.035    Blood Urine Negative NEG^Negative    pH Urine 7.5 (H) 5.0 - 7.0 pH    Protein Albumin Urine >600 (A) NEG^Negative mg/dL    Urobilinogen mg/dL Normal 0.0 - 2.0 mg/dL    Nitrite Urine Negative NEG^Negative    Leukocyte Esterase Urine Small (A) NEG^Negative    Source Midstream Urine     WBC Urine 44 (H) 0 - 2 /HPF    RBC Urine 1 0 - 2 /HPF    Bacteria Urine Few (A) NEG^Negative /HPF    Squamous Epithelial /HPF Urine 1 0 - 1 /HPF    Transitional Epi <1 0 - 1 /HPF   Fractional excretion of sodium   Result Value Ref Range    Creatinine Urine 65 mg/dL    Sodium Urine mmol/L 40 mmol/L    %FENA 1.3 %   Osmolality urine   Result Value Ref Range    Urine Osmolality 330 100 - 1200 mmol/kg   Glucose by meter   Result Value Ref Range    Glucose 277 (H) 70 - 99 mg/dL   CK total   Result Value Ref Range    CK Total 26 (L) 30 - 225 U/L   Basic metabolic panel   Result Value Ref Range    Sodium 140 133 - 144 mmol/L    Potassium 3.8 3.4 - 5.3 mmol/L    Chloride 109 94 - 109 mmol/L    Carbon Dioxide 25 20 - 32 mmol/L    Anion Gap 6 3 - 14 mmol/L    Glucose 92 70 - 99 mg/dL    Urea Nitrogen 39 (H) 7 - 30 mg/dL    Creatinine 2.92 (H) 0.52 - 1.04 mg/dL    GFR Estimate 16 (L) >60 mL/min/1.7m2    GFR Estimate If Black 19 (L) >60 mL/min/1.7m2    Calcium 7.9 (L) 8.5 - 10.1 mg/dL   CBC with platelets differential   Result Value Ref Range    WBC 14.5 (H) 4.0 - 11.0 10e9/L    RBC Count 2.83 (L) 3.8 - 5.2 10e12/L    Hemoglobin 7.7 (L) 11.7 - 15.7 g/dL    Hematocrit 24.8 (L) 35.0 - 47.0 %    MCV 88 78 - 100 fl    MCH 27.2 26.5 - 33.0 pg    MCHC 31.0 (L) 31.5 - 36.5 g/dL    RDW 13.9 10.0 - 15.0 %    Platelet Count 208 150 - 450 10e9/L    Diff Method Automated Method     % Neutrophils 82.4 %    % Lymphocytes 10.0 %    % Monocytes 6.4 %    % Eosinophils 0.8 %    % Basophils 0.1 %    % Immature Granulocytes 0.3 %    Nucleated RBCs 0 0 /100    Absolute Neutrophil 11.9 (H) 1.6 - 8.3 10e9/L     Absolute Lymphocytes 1.5 0.8 - 5.3 10e9/L    Absolute Monocytes 0.9 0.0 - 1.3 10e9/L    Absolute Eosinophils 0.1 0.0 - 0.7 10e9/L    Absolute Basophils 0.0 0.0 - 0.2 10e9/L    Abs Immature Granulocytes 0.1 0 - 0.4 10e9/L    Absolute Nucleated RBC 0.0    CRP inflammation   Result Value Ref Range    CRP Inflammation 220.0 (H) 0.0 - 8.0 mg/L   Glucose by meter   Result Value Ref Range    Glucose 92 70 - 99 mg/dL   Glucose by meter   Result Value Ref Range    Glucose 38 (LL) 70 - 99 mg/dL   Glucose by meter   Result Value Ref Range    Glucose 50 (LL) 70 - 99 mg/dL   Glucose by meter   Result Value Ref Range    Glucose 88 70 - 99 mg/dL   Urea nitrogen random urine with Creat Ratio   Result Value Ref Range    Urea Nitrogen, Ur 398 mg/dL    Urea Nitrogen Urine g/g Cr 6 g/g Cr   Glucose by meter   Result Value Ref Range    Glucose 307 (H) 70 - 99 mg/dL   Glucose by meter   Result Value Ref Range    Glucose 112 (H) 70 - 99 mg/dL   CBC with platelets differential   Result Value Ref Range    WBC 9.4 4.0 - 11.0 10e9/L    RBC Count 2.57 (L) 3.8 - 5.2 10e12/L    Hemoglobin 7.0 (L) 11.7 - 15.7 g/dL    Hematocrit 22.6 (L) 35.0 - 47.0 %    MCV 88 78 - 100 fl    MCH 27.2 26.5 - 33.0 pg    MCHC 31.0 (L) 31.5 - 36.5 g/dL    RDW 14.4 10.0 - 15.0 %    Platelet Count 213 150 - 450 10e9/L    Diff Method Automated Method     % Neutrophils 78.0 %    % Lymphocytes 14.4 %    % Monocytes 4.8 %    % Eosinophils 2.1 %    % Basophils 0.1 %    % Immature Granulocytes 0.6 %    Nucleated RBCs 0 0 /100    Absolute Neutrophil 7.3 1.6 - 8.3 10e9/L    Absolute Lymphocytes 1.4 0.8 - 5.3 10e9/L    Absolute Monocytes 0.5 0.0 - 1.3 10e9/L    Absolute Eosinophils 0.2 0.0 - 0.7 10e9/L    Absolute Basophils 0.0 0.0 - 0.2 10e9/L    Abs Immature Granulocytes 0.1 0 - 0.4 10e9/L    Absolute Nucleated RBC 0.0    Basic metabolic panel   Result Value Ref Range    Sodium 144 133 - 144 mmol/L    Potassium 3.3 (L) 3.4 - 5.3 mmol/L    Chloride 114 (H) 94 - 109 mmol/L     Carbon Dioxide 22 20 - 32 mmol/L    Anion Gap 8 3 - 14 mmol/L    Glucose 96 70 - 99 mg/dL    Urea Nitrogen 42 (H) 7 - 30 mg/dL    Creatinine 3.19 (H) 0.52 - 1.04 mg/dL    GFR Estimate 14 (L) >60 mL/min/1.7m2    GFR Estimate If Black 17 (L) >60 mL/min/1.7m2    Calcium 7.5 (L) 8.5 - 10.1 mg/dL   Magnesium   Result Value Ref Range    Magnesium 2.0 1.6 - 2.3 mg/dL   Phosphorus   Result Value Ref Range    Phosphorus 4.2 2.5 - 4.5 mg/dL   CRP inflammation   Result Value Ref Range    CRP Inflammation 160.0 (H) 0.0 - 8.0 mg/L   Vancomycin level   Result Value Ref Range    Vancomycin Level 9.6 mg/L   Glucose by meter   Result Value Ref Range    Glucose 104 (H) 70 - 99 mg/dL   Glucose by meter   Result Value Ref Range    Glucose 77 70 - 99 mg/dL   Glucose by meter   Result Value Ref Range    Glucose 140 (H) 70 - 99 mg/dL   Glucose by meter   Result Value Ref Range    Glucose 80 70 - 99 mg/dL     *Note: Due to a large number of results and/or encounters for the requested time period, some results have not been displayed. A complete set of results can be found in Results Review.       ASSESSMENT/PLAN:             1. Cellulitis of right leg  Slowly improving ut not resoled  Continue doxt times 1-2 weeks, if wors eadd amox  - doxycycline (VIBRA-TABS) 100 MG tablet; Take 1 tablet (100 mg) by mouth 2 times daily  Dispense: 28 tablet; Refill: 0  - amoxicillin (AMOXIL) 500 MG capsule; Take 1 capsule (500 mg) by mouth 2 times daily for 14 days  Dispense: 28 capsule; Refill: 0  - MENTAL HEALTH REFERRAL  - Adult; Outpatient Treatment; Individual/Couples/Family/Group Therapy/Health Psychology[place] [facility] [place] [facility] [phone]; The scheduling team will contact you to schedule your appointment.  If you have any ...    2. Chronic kidney disease, stage 4 (severe) (H)  Worried about dialysis  Follow up with consultant as planned.   - MENTAL HEALTH REFERRAL  - Adult; Outpatient Treatment; Individual/Couples/Family/Group  Therapy/Health Psychology[place] [facility] [place] [facility] [phone]; The scheduling team will contact you to schedule your appointment.  If you have any ...    3. Type 2 diabetes mellitus with diabetic neuropathy, with long-term current use of insulin (H)  Seeing endo, work on diet/ takingmeds, no hypoglycemic spells    4. Very poor mobility  Working on getting wheelchair    5. Acute reaction to stress  Needs therapy  - MENTAL HEALTH REFERRAL  - Adult; Outpatient Treatment; Individual/Couples/Family/Group Therapy/Health Psychology; G: [facility] [phone]; The scheduling team will contact you to schedule your appointment.  If you have any ...    Follow up in 1 month.   See Patient Instructions    Noam Jackson MD  [facility] [place]

## 2017-11-17 NOTE — TELEPHONE ENCOUNTER
Reason for call:  Order   Order or referral being requested: PT orders 2 times a week for 2 weeks  Reason for request: therapeutic exercise, transfer and mobility, home exercise program  Date needed: as soon as possible  Has the patient been seen by the PCP for this problem? NO    Additional comments: n/a      Phone number to reach patient:  Other phone number:  [phone]    Best Time:  n/a    Can we leave a detailed message on this number?  YES

## 2017-11-20 ENCOUNTER — OFFICE VISIT (OUTPATIENT)
Dept: FAMILY MEDICINE | Facility: CLINIC | Age: 67
End: 2017-11-20
Payer: MEDICARE

## 2017-11-20 VITALS
TEMPERATURE: 97.5 F | OXYGEN SATURATION: 96 % | DIASTOLIC BLOOD PRESSURE: 57 MMHG | SYSTOLIC BLOOD PRESSURE: 129 MMHG | HEART RATE: 67 BPM

## 2017-11-20 DIAGNOSIS — F43.0 ACUTE REACTION TO STRESS: ICD-10-CM

## 2017-11-20 DIAGNOSIS — L03.115 CELLULITIS OF RIGHT LEG: ICD-10-CM

## 2017-11-20 DIAGNOSIS — Z79.4 TYPE 2 DIABETES MELLITUS WITH DIABETIC NEUROPATHY, WITH LONG-TERM CURRENT USE OF INSULIN (H): ICD-10-CM

## 2017-11-20 DIAGNOSIS — Z74.09 VERY POOR MOBILITY: ICD-10-CM

## 2017-11-20 DIAGNOSIS — N18.4 CHRONIC KIDNEY DISEASE, STAGE 4 (SEVERE) (H): Primary | Chronic | ICD-10-CM

## 2017-11-20 DIAGNOSIS — E11.40 TYPE 2 DIABETES MELLITUS WITH DIABETIC NEUROPATHY, WITH LONG-TERM CURRENT USE OF INSULIN (H): ICD-10-CM

## 2017-11-20 PROCEDURE — 99214 OFFICE O/P EST MOD 30 MIN: CPT | Performed by: FAMILY MEDICINE

## 2017-11-20 RX ORDER — DOXYCYCLINE HYCLATE 100 MG
100 TABLET ORAL 2 TIMES DAILY
Qty: 28 TABLET | Refills: 0 | Status: SHIPPED | OUTPATIENT
Start: 2017-11-20 | End: 2017-12-08

## 2017-11-20 RX ORDER — AMOXICILLIN 500 MG/1
500 CAPSULE ORAL 2 TIMES DAILY
Qty: 28 CAPSULE | Refills: 0 | Status: SHIPPED | OUTPATIENT
Start: 2017-11-20 | End: 2017-12-04

## 2017-11-20 NOTE — MR AVS SNAPSHOT
After Visit Summary   11/20/2017    Supriya Herr    MRN: [id number]           Patient Information     Date Of Birth          1950        Visit Information        Provider Department      11/20/2017 1:00 PM Noam Jackson MD [facility]        Today's Diagnoses     Chronic kidney disease, stage 4 (severe) (H)    -  1    Cellulitis of right leg        Type 2 diabetes mellitus with diabetic neuropathy, with long-term current use of insulin (H)        Very poor mobility        Acute reaction to stress           Follow-ups after your visit        Additional Services     MENTAL HEALTH REFERRAL  - Adult; Outpatient Treatment; Individual/Couples/Family/Group Therapy/Health Psychology; FMG: [facility] [phone]; The scheduling team will contact you to schedule your appointment.  If you have any ...       All scheduling is subject to the client's specific insurance plan & benefits, provider/location availability, and provider clinical specialities.  Please arrive 15 minutes early for your first appointment and bring your completed paperwork.    Please be aware that coverage of these services is subject to the terms and limitations of your health insurance plan.  Call member services at your health plan with any benefit or coverage questions.                            Your next 10 appointments already scheduled     Dec 13, 2017  2:00 PM CST   Lab with  LAB   [facility] ([facility])    [address] [facility] [address] [phone]            Dec 13, 2017  2:50 PM CST   (Arrive by 2:20 PM)   Return Visit with Kathryn Basilio MD   [facility] Nephrology ([facility])    [address]  [facility] [address] [phone]            Dec 22, 2017  1:40 PM CST   (Arrive by 1:25 PM)   RETURN FOOT/ANKLE with Eleazar Pack DPM   [facility] ([facility])    [address]  4th Floor  [facility] [address]   [phone]            Dec 22, 2017  2:30 PM CST   (Arrive by 2:15 PM)   RETURN DIABETES with Arabella Kamara PA-C   [facility] ([facility])    [address]  3rd Floor  [facility] [address]   [phone]            Jan 09, 2018 10:30 AM CST   LAB with RD LAB   [facility] ([facility])    [address] [place] [address]   [phone]           Please do not eat 10-12 hours before your appointment if you are coming in fasting for labs on lipids, cholesterol, or glucose (sugar). This does not apply to pregnant women. Water, hot tea and black coffee (with nothing added) are okay. Do not drink other fluids, diet soda or chew gum.            Jan 09, 2018 11:00 AM CST   Office Visit with Noam Jackson MD   [facility] ([facility])    [address] [place] [id number] [phone]           Bring a current list of meds and any records pertaining to this visit. For Physicals, please bring immunization records and any forms needing to be filled out. Please arrive 10 minutes early to complete paperwork.              Who to contact     If you have questions or need follow up information about today's clinic visit or your schedule please contact [facility] directly at [phone].  Normal or non-critical lab and imaging results will be communicated to you by MyChart, letter or phone within 4 business days after the clinic has received the results. If you do not hear from us within 7 days, please contact the clinic through MyChart or phone. If you have a critical or abnormal lab result, we will notify you by phone as soon as possible.  Submit refill requests through [employer] or call your pharmacy and they will forward the  "refill request to us. Please allow 3 business days for your refill to be completed.          Additional Information About Your Visit        [employer] Information     [employer] lets you send messages to your doctor, view your test results, renew your prescriptions, schedule appointments and more. To sign up, go to [email or web address] . Click on \"Log in\" on the left side of the screen, which will take you to the Welcome page. Then click on \"Sign up Now\" on the right side of the page.     You will be asked to enter the access code listed below, as well as some personal information. Please follow the directions to create your username and password.     Your access code is: [email or web address]  Expires: 12/10/2017 12:38 PM     Your access code will  in 90 days. If you need help or a new code, please call your [facility] clinic or [phone].        Care EveryWhere ID     This is your Care EveryWhere ID. This could be used by other organizations to access your [facility] medical records  [id number]        Your Vitals Were     Pulse Temperature Pulse Oximetry             67 97.5  F (36.4  C) (Oral) 96%          Blood Pressure from Last 3 Encounters:   17 129/57   17 132/68   17 129/41    Weight from Last 3 Encounters:   17 222 lb 1.6 oz (100.7 kg)   17 224 lb (101.6 kg)   17 220 lb 14.4 oz (100.2 kg)              We Performed the Following     MENTAL HEALTH REFERRAL  - Adult; Outpatient Treatment; Individual/Couples/Family/Group Therapy/Health Psychology; FMG: [facility] [phone]; The scheduling team will contact you to schedule your appointment.  If you have any ...          Today's Medication Changes          These changes are accurate as of: 17  1:39 PM.  If you have any questions, ask your nurse or doctor.               Start taking these medicines.        Dose/Directions    amoxicillin 500 MG capsule   Commonly known as:  AMOXIL   Used for:  Cellulitis of " right leg   Started by:  Noam Jackson MD        Dose:  500 mg   Take 1 capsule (500 mg) by mouth 2 times daily for 14 days   Quantity:  28 capsule   Refills:  0            Where to get your medicines      These medications were sent to [facility] #[id number] - [place], [place] - [address]  [address] [facility] [address]     Phone:  [phone]     amoxicillin 500 MG capsule    doxycycline 100 MG tablet                Primary Care Provider Office Phone # Fax #    Noam Jackson [phone]       [address] [facility] [address]  [facility] [address]        Equal Access to Services     [facility]: Hadii aad ku hadasho Soomaali, waaxda luqadaha, qaybta kaalmada adeegyada, waxcarla pruitt hayamy adetere sood . So [facility] [phone].    ATENCIÓN: Si habla español, tiene a santoro disposición servicios gratuitos de asistencia lingüística. [facility] [phone].    We comply with applicable federal civil rights laws and [place] laws. We do not discriminate on the basis of race, color, national origin, age, disability, sex, sexual orientation, or gender identity.            Thank you!     Thank you for choosing [facility]  for your care. Our goal is always to provide you with excellent care. Hearing back from our patients is one way we can continue to improve our services. Please take a few minutes to complete the written survey that you may receive in the mail after your visit with us. Thank you!             Your Updated Medication List - Protect others around you: Learn how to safely use, store and throw away your medicines at www.disposemymeds.org.          This list is accurate as of: 11/20/17  1:39 PM.  Always use your most recent med list.                   Brand Name Dispense Instructions for use Diagnosis    acetaminophen 325 MG tablet    TYLENOL    100 tablet    Take 2 tablets (650 mg) by mouth 4 times daily    Cellulitis of right leg        albuterol 108 (90 BASE) MCG/ACT Inhaler    PROAIR HFA/PROVENTIL HFA/VENTOLIN HFA    3 Inhaler    Inhale 2 puffs into the lungs every 6 hours as needed for shortness of breath / dyspnea or wheezing    Cough       amLODIPine 10 MG tablet    NORVASC    90 tablet    Take 1 tablet (10 mg) by mouth daily    Essential hypertension with goal blood pressure less than 140/90       amoxicillin 500 MG capsule    AMOXIL    28 capsule    Take 1 capsule (500 mg) by mouth 2 times daily for 14 days    Cellulitis of right leg       ASPIRIN NOT PRESCRIBED    INTENTIONAL    0 each    1 each continuous prn Antiplatelet medication not prescribed intentionally due to current nosebleeds    Anemia due to blood loss, acute       atorvastatin 20 MG tablet    LIPITOR    90 tablet    Take 1 tablet (20 mg) by mouth daily    Hyperlipidemia LDL goal <100       * blood glucose monitoring test strip    ONETOUCH ULTRA    200 strip    Use to test blood sugars 2 times daily or as directed.    Type 2 diabetes mellitus with stage 3 chronic kidney disease, without long-term current use of insulin (H)       * blood glucose monitoring test strip    ONETOUCH ULTRA    200 strip    Test your blood sugar 3-4 times per day.    Type 2 diabetes mellitus with hyperglycemia, with long-term current use of insulin (H)       carvedilol 25 MG tablet    COREG    180 tablet    Take 1 tablet (25 mg) by mouth 2 times daily (with meals)    Essential hypertension with goal blood pressure less than 140/90       doxycycline 100 MG tablet    VIBRA-TABS    28 tablet    Take 1 tablet (100 mg) by mouth 2 times daily    Cellulitis of right leg       DULoxetine 30 MG EC capsule    CYMBALTA    180 capsule    Take 1 capsule (30 mg) by mouth 2 times daily    Type 2 diabetes mellitus with diabetic nephropathy, with long-term current use of insulin (H), Diabetic polyneuropathy associated with diabetes mellitus due to underlying condition (H)       furosemide 80 MG tablet    LASIX    180  tablet    Take 1 tablet (80 mg) by mouth 2 times daily Patient taking 80mg in AM, 40mg in PM    Lymphedema of both lower extremities       hydrALAZINE 100 MG Tabs tablet    APRESOLINE    180 tablet    Take 1 tablet (100 mg) by mouth every 8 hours    Hypertension goal BP (blood pressure) < 140/90       insulin glargine 100 UNIT/ML injection    LANTUS    3 mL    Inject 20 Units Subcutaneous At Bedtime    Type 2 diabetes mellitus with diabetic neuropathy, with long-term current use of insulin (H)       insulin pen needle 31G X 6 MM    ULTICARE MINI    100 each    Use daily or as directed.    Type 2 diabetes, HbA1c goal < 7% (H)       lactobacillus rhamnosus (GG) capsule     60 capsule    Take 1 capsule by mouth 2 times daily    Cellulitis of right leg       lisinopril 40 MG tablet    PRINIVIL/ZESTRIL    60 tablet    Take 1 tablet (40 mg) by mouth daily    Hypertension goal BP (blood pressure) < 140/90       miconazole 2 % powder    MICATIN; MICRO GUARD    71 g    Apply topically 2 times daily    Fungal dermatitis       MULTIVITAMIN PO      1 tablet by mouth daily        NovoLOG FLEXPEN 100 UNIT/ML injection   Generic drug:  insulin aspart     15 mL    Inject 2 units SQ with breakfast, lunch and dinner.    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, unspecified laterality, subsequent encounter (H), CKD (chronic kidney disease) stage 3, GFR 30-59 ml/min, Type 2 diabetes mellitus with stage 3 chronic kidney disease, without long-term current use of insulin (H)        spironolactone 25 MG tablet    ALDACTONE    60 tablet    Take 1 tablet (25 mg) by mouth daily    Hypertension goal BP (blood pressure) < 140/90       tolterodine 2 MG 24 hr capsule    DETROL LA    60 capsule    TAKE 1 CAPSULE (2 MG) BY MOUTH DAILY    Urge incontinence of urine       * Notice:  This list has 5 medication(s) that are the same as other medications prescribed for you. Read the directions carefully, and ask your doctor or other care provider to review them with you.

## 2017-11-20 NOTE — NURSING NOTE
"Chief Complaint   Patient presents with     Hospital F/U       Initial /57 (BP Location: Right arm, Patient Position: Chair, Cuff Size: Adult Large)  Pulse 67  Temp 97.5  F (36.4  C) (Oral)  SpO2 96% Estimated body mass index is 36.12 kg/(m^2) as calculated from the following:    Height as of 11/3/17: 5' 5.75\" (1.67 m).    Weight as of 11/9/17: 222 lb 1.6 oz (100.7 kg).  Medication Reconciliation: complete     Celestina Wei CMA    "

## 2017-11-20 NOTE — LETTER
[facility]  [address] [place] [address]  [facility] [id number] [phone]          November 20, 2017    RE:  Supriya Herr                                                                                                                                                       [address]  [facility] [address]            To whom it may concern:    Supriya Herr is under my professional care for    Cellulitis of right leg  Chronic kidney disease, stage 4 (severe) (H)  Type 2 diabetes mellitus with diabetic neuropathy, with long-term current use of insulin (H)  Very poor mobility    Due to her recent prolonged hospitalization and weakness she is needing more home care assistance ; i.e. up to 4-6 hours a day      Sincerely,        Noam Jackson MD

## 2017-11-27 ENCOUNTER — DOCUMENTATION ONLY (OUTPATIENT)
Dept: CARE COORDINATION | Facility: CLINIC | Age: 67
End: 2017-11-27

## 2017-11-27 NOTE — PROGRESS NOTES
[facility] now requests orders and shares plan of care/discharge summaries for some patients through [employer].  Please REPLY TO THIS MESSAGE in order to give authorization for orders when needed.  This is considered a verbal order, you will still receive a faxed copy of orders for signature.  Thank you for your assistance in improving collaboration for our patients.    ORDER/ Lymphedema therapy evaluation 1 day 1 , previous order .       Please respond to this message asap. Thank you.    Kassy Frazier, OTR/L CLT  [facility]  [phone]

## 2017-11-28 ENCOUNTER — DOCUMENTATION ONLY (OUTPATIENT)
Dept: CARE COORDINATION | Facility: CLINIC | Age: 67
End: 2017-11-28

## 2017-11-28 NOTE — PROGRESS NOTES
[facility] now requests orders and shares plan of care/discharge summaries for some patients through [employer].  Please REPLY TO THIS MESSAGE in order to give authorization for orders when needed.  This is considered a verbal order, you will still receive a faxed copy of orders for signature.  Thank you for your assistance in improving collaboration for our patients.    ORDER/ OT lymphedema therapy continued treatment 1x/week for 1 week, 2x/week for 4 weeks to meet the following goals.     PLAN OF CARE/  The plan will also include falls prevention plan, monitor and treat pain, monitor skin integrity, continence management, monitor for s/s of depression, diabetic foot care, monitor for symptoms of heart failure and to achieve the following goals. GOALS TO BE MET BY 12/23/17  1. Pt will be able to tolerate gradient compression bandaging 23 hrs/day or wearing of compression  garment during waking hours to prevent reaccumulation of extracellular fluid and promote increased skin integrity. progressing  2. Pt will perform fluid mobilization HEP with minimal assistance to improve lymphatic flow and venous return.  3. Pt/CG will be independent in edema management to maintain reduced limb girth on days home care staff are unavailable.   GOALS TO BE MET BY DISCHARGE  1. Pt will verbalize understanding of techniques to independently manage their edema at home.  2. Pt/CG will be independent in donning/doffing, wearing schedule, and care of compression garment to maintain edema long term.  3. Circumferential measurement will be reduced by 3 percent in the R LE to promote improved skin integrity and enable pt to perform safe transfers and improved functional mobility    Please respond to this message as soon as possible. Thank you!    Kassy Frazier, OTR/L CLT  [facility]  Work cell [phone]

## 2017-12-01 ENCOUNTER — TELEPHONE (OUTPATIENT)
Dept: NEPHROLOGY | Facility: CLINIC | Age: 67
End: 2017-12-01

## 2017-12-01 ENCOUNTER — NURSE TRIAGE (OUTPATIENT)
Dept: NURSING | Facility: CLINIC | Age: 67
End: 2017-12-01

## 2017-12-01 NOTE — TELEPHONE ENCOUNTER
Hello, this is Chetna's office from Medicine Specialty on the 3rd floor at [facility] located at [address] [place] [address]. We are reminding you of your upcoming Nephrology appointment on 12/13/2017 at 1:30 pm. Please arrive an hour prior to your appointment time for labs. Also, please bring an updated medication list including dose of prescribed and over the counter medications you are currently taking or your labeled medication bottles with you to your appointment. If you have any questions or would like to cancel or reschedule your appointment, please call us at [phone].     If you are having labs draw same day you need a lab appointment scheduled 1 hour prior to your visit.    You are welcome to have your labs done 2-7 days before your appointment at any [facility] or [facility] facility. If you have your labs completed before your appointment, please still come 30 minutes early for check-in.     Thank-You for allowing us to be a member of your Health Care Team,     Celeste Schofield., CMA

## 2017-12-07 ENCOUNTER — DOCUMENTATION ONLY (OUTPATIENT)
Dept: CARE COORDINATION | Facility: CLINIC | Age: 67
End: 2017-12-07

## 2017-12-07 DIAGNOSIS — R60.0 EDEMA OF LOWER EXTREMITY: Primary | ICD-10-CM

## 2017-12-07 NOTE — PROGRESS NOTES
Please cue up these orders  OK   Orders Placed This Encounter     order for DME     Sig: Equipment being ordered: Right Lower extremity Solaris Ready wrap calf piece:  Size small/length tall , knee piece: Size small , Thigh piece size small/length average.     Dispense:  1 Units     Refill:  0

## 2017-12-07 NOTE — PROGRESS NOTES
[facility] now requests orders and shares plan of care/discharge summaries for some patients through [facility].  Please REPLY TO THIS MESSAGE once order for garments has been faxed to [facility] medical      ORDER/ Right Lower extremity Solaris Ready wrap calf piece:  Size small/length tall , knee piece: Size small , Thigh piece size small/length average.        Fax the above order to [facility] at [phone].     Please respond to this message once this has been completed, thank you!    Call me with any questions/concerns.    Kassy Frazier OTR/L CLT  [phone]

## 2017-12-08 DIAGNOSIS — L03.115 CELLULITIS OF RIGHT LEG: ICD-10-CM

## 2017-12-08 NOTE — TELEPHONE ENCOUNTER
DOXYCYCLINE HYCLATE 100 MG TAB        Last Written Prescription Date:  11/20/17  Last Fill Quantity: 28,   # refills: 0  Last Office Visit: 11/20/17  Future Office visit:    Next 5 appointments (look out 90 days)     Jan 09, 2018 11:00 AM CST   Office Visit with Noam Jackson MD   [facility] ([facility]    [address] [place] [id number] [phone]                   Routing refill request to provider for review/approval because:  Does not meet protocol criteria

## 2017-12-11 ENCOUNTER — TELEPHONE (OUTPATIENT)
Dept: FAMILY MEDICINE | Facility: CLINIC | Age: 67
End: 2017-12-11

## 2017-12-11 DIAGNOSIS — N18.4 CHRONIC KIDNEY DISEASE, STAGE 4 (SEVERE) (H): Chronic | ICD-10-CM

## 2017-12-11 DIAGNOSIS — R80.9 PROTEINURIA: Primary | ICD-10-CM

## 2017-12-11 RX ORDER — DOXYCYCLINE HYCLATE 100 MG
TABLET ORAL
Qty: 28 TABLET | Refills: 0 | Status: SHIPPED | OUTPATIENT
Start: 2017-12-11 | End: 2018-01-09

## 2017-12-11 NOTE — TELEPHONE ENCOUNTER
Return call to [facility] nurse, left a detailed message with orders per protocol.     Carla Field BSN RN  [facility]

## 2017-12-11 NOTE — TELEPHONE ENCOUNTER
[facility] RN Tye is requesting skilled nursing orders for one time a week for six weeks and 3 PRN    Tye can be reached @ [phone] rodney

## 2017-12-11 NOTE — TELEPHONE ENCOUNTER
Dr. Jackson,     Please review/sign or advise regarding refill of Doxycycline 100 mg tablets for right leg cellulitis.     Called patient, who states that she is feeling better. Her pain is less and she denies fever, chills. She reports still feeling a little weak/tired.     Called [facility] nurse, Tye to obtain more information regarding refill request. Left a message with return call request.        Carla Field RN  [facility]

## 2017-12-13 ENCOUNTER — OFFICE VISIT (OUTPATIENT)
Dept: NEPHROLOGY | Facility: CLINIC | Age: 67
End: 2017-12-13
Attending: INTERNAL MEDICINE
Payer: MEDICARE

## 2017-12-13 VITALS
TEMPERATURE: 98.1 F | HEIGHT: 66 IN | BODY MASS INDEX: 32.62 KG/M2 | HEART RATE: 75 BPM | OXYGEN SATURATION: 95 % | DIASTOLIC BLOOD PRESSURE: 62 MMHG | WEIGHT: 203 LBS | SYSTOLIC BLOOD PRESSURE: 106 MMHG

## 2017-12-13 DIAGNOSIS — I50.20 SYSTOLIC CONGESTIVE HEART FAILURE, UNSPECIFIED CONGESTIVE HEART FAILURE CHRONICITY: ICD-10-CM

## 2017-12-13 DIAGNOSIS — R80.9 PROTEINURIA: ICD-10-CM

## 2017-12-13 DIAGNOSIS — N18.5 CKD (CHRONIC KIDNEY DISEASE) STAGE 5, GFR LESS THAN 15 ML/MIN (H): ICD-10-CM

## 2017-12-13 DIAGNOSIS — E11.42 DIABETIC POLYNEUROPATHY ASSOCIATED WITH TYPE 2 DIABETES MELLITUS (H): Primary | ICD-10-CM

## 2017-12-13 DIAGNOSIS — I10 HYPERTENSION GOAL BP (BLOOD PRESSURE) < 140/90: ICD-10-CM

## 2017-12-13 DIAGNOSIS — N18.4 CHRONIC KIDNEY DISEASE, STAGE 4 (SEVERE) (H): Chronic | ICD-10-CM

## 2017-12-13 LAB
ALBUMIN SERPL-MCNC: 2.5 G/DL (ref 3.4–5)
ANION GAP SERPL CALCULATED.3IONS-SCNC: 7 MMOL/L (ref 3–14)
BUN SERPL-MCNC: 64 MG/DL (ref 7–30)
CALCIUM SERPL-MCNC: 8.7 MG/DL (ref 8.5–10.1)
CHLORIDE SERPL-SCNC: 108 MMOL/L (ref 94–109)
CO2 SERPL-SCNC: 26 MMOL/L (ref 20–32)
CREAT SERPL-MCNC: 3.74 MG/DL (ref 0.52–1.04)
CREAT UR-MCNC: 51 MG/DL
DEPRECATED CALCIDIOL+CALCIFEROL SERPL-MC: 18 UG/L (ref 20–75)
GFR SERPL CREATININE-BSD FRML MDRD: 12 ML/MIN/1.7M2
GLUCOSE SERPL-MCNC: 109 MG/DL (ref 70–99)
HGB BLD-MCNC: 9.3 G/DL (ref 11.7–15.7)
PHOSPHATE SERPL-MCNC: 4.2 MG/DL (ref 2.5–4.5)
POTASSIUM SERPL-SCNC: 4 MMOL/L (ref 3.4–5.3)
PROT UR-MCNC: 4.21 G/L
PROT/CREAT 24H UR: 8.32 G/G CR (ref 0–0.2)
PTH-INTACT SERPL-MCNC: 98 PG/ML (ref 12–72)
SODIUM SERPL-SCNC: 140 MMOL/L (ref 133–144)

## 2017-12-13 PROCEDURE — 99213 OFFICE O/P EST LOW 20 MIN: CPT | Mod: ZF

## 2017-12-13 PROCEDURE — 36415 COLL VENOUS BLD VENIPUNCTURE: CPT | Performed by: INTERNAL MEDICINE

## 2017-12-13 PROCEDURE — 83970 ASSAY OF PARATHORMONE: CPT | Performed by: INTERNAL MEDICINE

## 2017-12-13 PROCEDURE — 85018 HEMOGLOBIN: CPT | Performed by: INTERNAL MEDICINE

## 2017-12-13 PROCEDURE — 82306 VITAMIN D 25 HYDROXY: CPT | Performed by: INTERNAL MEDICINE

## 2017-12-13 PROCEDURE — 80069 RENAL FUNCTION PANEL: CPT | Performed by: INTERNAL MEDICINE

## 2017-12-13 PROCEDURE — 84156 ASSAY OF PROTEIN URINE: CPT | Performed by: INTERNAL MEDICINE

## 2017-12-13 RX ORDER — HYDRALAZINE HYDROCHLORIDE 100 MG/1
50 TABLET, FILM COATED ORAL EVERY 8 HOURS
Qty: 180 TABLET | Refills: 1 | COMMUNITY
Start: 2017-12-13 | End: 2017-12-13

## 2017-12-13 RX ORDER — GABAPENTIN 100 MG/1
200 CAPSULE ORAL AT BEDTIME
Qty: 60 CAPSULE | Refills: 11 | Status: SHIPPED | OUTPATIENT
Start: 2017-12-13 | End: 2017-12-22

## 2017-12-13 RX ORDER — HYDRALAZINE HYDROCHLORIDE 100 MG/1
50 TABLET, FILM COATED ORAL 2 TIMES DAILY
Qty: 180 TABLET | Refills: 1 | Status: SHIPPED | OUTPATIENT
Start: 2017-12-13 | End: 2017-12-13

## 2017-12-13 RX ORDER — HYDRALAZINE HYDROCHLORIDE 100 MG/1
50 TABLET, FILM COATED ORAL 2 TIMES DAILY
Qty: 180 TABLET | Refills: 1 | Status: ON HOLD | OUTPATIENT
Start: 2017-12-13 | End: 2018-01-26

## 2017-12-13 ASSESSMENT — PAIN SCALES - GENERAL: PAINLEVEL: NO PAIN (0)

## 2017-12-13 NOTE — NURSING NOTE
"Chief Complaint   Patient presents with     RECHECK     Follow up CKD stage 4.       Initial /62  Pulse 75  Temp 98.1  F (36.7  C) (Oral)  Ht 1.67 m (5' 5.75\")  SpO2 95% Estimated body mass index is 36.12 kg/(m^2) as calculated from the following:    Height as of 11/3/17: 1.67 m (5' 5.75\").    Weight as of 11/9/17: 100.7 kg (222 lb 1.6 oz).  Medication Reconciliation: complete   Celeste Schofield,. CMA    "

## 2017-12-13 NOTE — MR AVS SNAPSHOT
After Visit Summary   12/13/2017    Supriya Herr    MRN: [id number]           Patient Information     Date Of Birth          1950        Visit Information        Provider Department      12/13/2017 2:50 PM Kathryn Basilio MD M [facility]        Today's Diagnoses     Diabetic polyneuropathy associated with type 2 diabetes mellitus (H)    -  1    Hypertension goal BP (blood pressure) < 140/90          Care Instructions    Take hydralazine 50mg twice a day  Monitor blood pressure  P[facility]in [phone]            Follow-ups after your visit        Follow-up notes from your care team     Return in about 10 weeks (around 2/21/2018).      Your next 10 appointments already scheduled     Dec 22, 2017  1:40 PM CST   (Arrive by 1:25 PM)   RETURN FOOT/ANKLE with Eleazar Pack DPM   [facility] ([facility])    [address] [place] [address]  4th [facility] [address]   [phone]            Dec 22, 2017  2:30 PM CST   (Arrive by 2:15 PM)   RETURN DIABETES with Arabella Kamara PA-C   [facility] ([facility])    [address] [place] [address]  3rd [facility] [address]   [phone]            Jan 02, 2018 11:00 AM CST   New Visit with Zari Thakkar LP   [facility] ([facility]  [address] [facility]  [facility] [address]   [phone]            Jan 09, 2018 10:30 AM CST   LAB with RD LAB   [facility] ([facility])    [address] [place] [address]  [facility] [address] [phone]           Please do not eat 10-12 hours before your appointment if you are coming in fasting for labs on lipids, cholesterol, or glucose (sugar). This does not apply to pregnant women. Water, hot tea and black coffee (with nothing added) are okay. Do not drink other fluids, diet soda or chew gum.            Jan 09,  2018 11:00 AM CST   Office Visit with Noam Jackson MD   [facility] ([facility])    [address] [place] [id number] [phone]           Bring a current list of meds and any records pertaining to this visit. For Physicals, please bring immunization records and any forms needing to be filled out. Please arrive 10 minutes early to complete paperwork.            Jan 24, 2018 10:30 AM CST   Lab with  LAB   [facility] ([facility])    [address] [place] [id number] [phone]            Jan 24, 2018 11:30 AM CST   Nurse Visit with  NEPHROLOGY NURSE   [facility] ([facility])    [address] [place] [id number] [phone]            Feb 21, 2018 10:45 AM CST   Lab with  LAB   [facility] ([facility])    [address] [place] [id number] [phone]            Feb 21, 2018 11:35 AM CST   (Arrive by 11:05 AM)   Return Visit with Kathryn Basilio MD   [facility] Nephrology ([facility])    [address] [place] [id number] [phone]              Who to contact     If you have questions or need follow up information about today's clinic visit or your schedule please contact [facility] NE[facility] directly at [phone].  Normal or non-critical lab and imaging results will be communicated to you by MyChart, letter or phone within 4 business days after the clinic has received the results. If you do not hear from us within 7 days, please contact the clinic through MyChart or phone. If you have a critical or abnormal lab result, we will notify you by phone as soon as possible.  Submit refill requests through tarpipe or call your pharmacy and they will forward the refill request to us. Please allow 3 business  "days for your refill to be completed.          Additional Information About Your Visit        GoNog[employer] Information     [employer] lets you send messages to your doctor, view your test results, renew your prescriptions, schedule appointments and more. To sign up, go to [email or web address] . Click on \"Log in\" on the left side of the screen, which will take you to the Welcome page. Then click on \"Sign up Now\" on the right side of the page.     You will be asked to enter the access code listed below, as well as some personal information. Please follow the directions to create your username and password.     Your access code is: [email or web address]  Expires: 3/13/2018  4:12 PM     Your access code will  in 90 days. If you need help or a new code, please call your [place] clinic or [phone].        Care EveryWhere ID     This is your Care EveryWhere ID. This could be used by other organizations to access your [place] medical records  [id number]        Your Vitals Were     Pulse Temperature Height Pulse Oximetry BMI (Body Mass Index)       75 98.1  F (36.7  C) (Oral) 1.67 m (5' 5.75\") 95% 33.01 kg/m2        Blood Pressure from Last 3 Encounters:   17 106/62   17 129/57   17 132/68    Weight from Last 3 Encounters:   17 92.1 kg (203 lb)   17 100.7 kg (222 lb 1.6 oz)   17 101.6 kg (224 lb)              Today, you had the following     No orders found for display         Today's Medication Changes          These changes are accurate as of: 17  4:12 PM.  If you have any questions, ask your nurse or doctor.               Start taking these medicines.        Dose/Directions    gabapentin 100 MG capsule   Commonly known as:  NEURONTIN   Used for:  Diabetic polyneuropathy associated with type 2 diabetes mellitus (H)   Started by:  Kathryn Basilio MD        Dose:  200 mg   Take 2 capsules (200 mg) by mouth At Bedtime   Quantity:  60 capsule   Refills:  11       hydrALAZINE " 100 MG Tabs tablet   Commonly known as:  APRESOLINE   Used for:  Hypertension goal BP (blood pressure) < 140/90   Started by:  Kathryn Basilio MD        Dose:  50 mg   Take 0.5 tablets (50 mg) by mouth 2 times daily   Quantity:  180 tablet   Refills:  1            Where to get your medicines      These medications were sent to [facility]/pharmacy #[id number] - [place], [place] - [address] [facility]  [address] [facility] [place] [address]     Phone:  [phone]     gabapentin 100 MG capsule    hydrALAZINE 100 MG Tabs tablet                Primary Care Provider Office Phone # Fax #    Noam Luis Jackson [phone]       [address]  [facility] [address]        Equal Access to Services     BLAIR [facility] Center[facility] : Hadii aad ku hadasho Soomaali, waaxda luqadaha, qaybta kaalmada adeegyada, waxcarla sood . So [facility] [phone].    ATENCIÓN: Si habla español, tiene a santoro disposición servicios gratuitos de asistencia lingüística. Ll[facility]leveland [facility] [phone].    We comply with applicable federal civil rights laws and [place] laws. We do not discriminate on the basis of race, color, national origin, age, disability, sex, sexual orientation, or gender identity.            Thank you!     Thank you for choosing [facility] NEPH[facility]  for your care. Our goal is always to provide you with excellent care. Hearing back from our patients is one way we can continue to improve our services. Please take a few minutes to complete the written survey that you may receive in the mail after your visit with us. Thank you!             Your Updated Medication List - Protect others around you: Learn how to safely use, store and throw away your medicines at www.disposemymeds.org.          This list is accurate as of: 12/13/17  4:12 PM.  Always use your most recent med list.                   Brand Name Dispense Instructions for use Diagnosis    acetaminophen 325 MG tablet    TYLENOL    100 tablet     Take 2 tablets (650 mg) by mouth 4 times daily    Cellulitis of right leg       albuterol 108 (90 BASE) MCG/ACT Inhaler    PROAIR HFA/PROVENTIL HFA/VENTOLIN HFA    3 Inhaler    Inhale 2 puffs into the lungs every 6 hours as needed for shortness of breath / dyspnea or wheezing    Cough       amLODIPine 10 MG tablet    NORVASC    90 tablet    Take 1 tablet (10 mg) by mouth daily    Essential hypertension with goal blood pressure less than 140/90       ASPIRIN NOT PRESCRIBED    INTENTIONAL    0 each    1 each continuous prn Antiplatelet medication not prescribed intentionally due to current nosebleeds    Anemia due to blood loss, acute       atorvastatin 20 MG tablet    LIPITOR    90 tablet    Take 1 tablet (20 mg) by mouth daily    Hyperlipidemia LDL goal <100       * blood glucose monitoring test strip    ONETOUCH ULTRA    200 strip    Use to test blood sugars 2 times daily or as directed.    Type 2 diabetes mellitus with stage 3 chronic kidney disease, without long-term current use of insulin (H)       * blood glucose monitoring test strip    ONETOUCH ULTRA    200 strip    Test your blood sugar 3-4 times per day.    Type 2 diabetes mellitus with hyperglycemia, with long-term current use of insulin (H)       carvedilol 25 MG tablet    COREG    180 tablet    Take 1 tablet (25 mg) by mouth 2 times daily (with meals)    Essential hypertension with goal blood pressure less than 140/90       doxycycline 100 MG tablet    VIBRA-TABS    28 tablet    TAKE 1 TABLET (100 MG) BY MOUTH 2 TIMES DAILY    Cellulitis of right leg       DULoxetine 30 MG EC capsule    CYMBALTA    180 capsule    Take 1 capsule (30 mg) by mouth 2 times daily    Type 2 diabetes mellitus with diabetic nephropathy, with long-term current use of insulin (H), Diabetic polyneuropathy associated with diabetes mellitus due to underlying condition (H)       furosemide 80 MG tablet    LASIX    180 tablet    Take 1 tablet (80 mg) by mouth 2 times daily Patient  taking 80mg in AM, 40mg in PM    Lymphedema of both lower extremities       gabapentin 100 MG capsule    NEURONTIN    60 capsule    Take 2 capsules (200 mg) by mouth At Bedtime    Diabetic polyneuropathy associated with type 2 diabetes mellitus (H)       hydrALAZINE 100 MG Tabs tablet    APRESOLINE    180 tablet    Take 0.5 tablets (50 mg) by mouth 2 times daily    Hypertension goal BP (blood pressure) < 140/90       insulin glargine 100 UNIT/ML injection    LANTUS    3 mL    Inject 20 Units Subcutaneous At Bedtime    Type 2 diabetes mellitus with diabetic neuropathy, with long-term current use of insulin (H)       insulin pen needle 31G X 6 MM    ULTICARE MINI    100 each    Use daily or as directed.    Type 2 diabetes, HbA1c goal < 7% (H)       lactobacillus rhamnosus (GG) capsule     60 capsule    Take 1 capsule by mouth 2 times daily    Cellulitis of right leg       lisinopril 40 MG tablet    PRINIVIL/ZESTRIL    60 tablet    Take 1 tablet (40 mg) by mouth daily    Hypertension goal BP (blood pressure) < 140/90       miconazole 2 % powder    MICATIN; MICRO GUARD    71 g    Apply topically 2 times daily    Fungal dermatitis       MULTIVITAMIN PO      1 tablet by mouth daily        NovoLOG FLEXPEN 100 UNIT/ML injection   Generic drug:  insulin aspart     15 mL    Inject 2 units SQ with breakfast, lunch and dinner.    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    Edema of lower extremity       spironolactone 25 MG tablet    ALDACTONE    60 tablet    Take 1 tablet (25 mg) by mouth daily    Hypertension goal BP (blood pressure) < 140/90       tolterodine 2 MG 24 hr capsule    DETROL LA    60 capsule    TAKE 1 CAPSULE (2 MG) BY MOUTH DAILY    Urge incontinence of urine       * Notice:  This list has 6 medication(s) that are the same as other medications prescribed for you. Read the directions carefully, and ask your doctor or other care provider to review them with you.
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
## 2017-12-14 PROCEDURE — G0180 MD CERTIFICATION HHA PATIENT: HCPCS | Performed by: FAMILY MEDICINE

## 2017-12-15 ENCOUNTER — TELEPHONE (OUTPATIENT)
Dept: ENDOCRINOLOGY | Facility: CLINIC | Age: 67
End: 2017-12-15

## 2017-12-15 DIAGNOSIS — E11.65 TYPE 2 DIABETES MELLITUS WITH HYPERGLYCEMIA, WITH LONG-TERM CURRENT USE OF INSULIN (H): ICD-10-CM

## 2017-12-15 DIAGNOSIS — Z79.4 TYPE 2 DIABETES MELLITUS WITH HYPERGLYCEMIA, WITH LONG-TERM CURRENT USE OF INSULIN (H): ICD-10-CM

## 2017-12-15 RX ORDER — INSULIN ASPART 100 [IU]/ML
INJECTION, SOLUTION INTRAVENOUS; SUBCUTANEOUS
Qty: 15 ML | Refills: 3 | COMMUNITY
Start: 2017-12-15 | End: 2018-07-17

## 2017-12-15 NOTE — TELEPHONE ENCOUNTER
Blood Glucose numbers reported by Pts daughter:    12/14/2017: 7:31pm  - 348, 10:49am - 194,   12/13/2017:  9:24pm - 369,   12/12//2017: 7:29pm - 281, 10:17am - 129  12/11/2017:  2:40pm - 213, 9: 47am - 126  12/10/2017:  6:33pm - 176, 1:59pm - 199, 10:51am - 182  12/09/2017: 6:02pm - 248, 10:05am - 147  12/08?2017:  10:14pm - 316 10:58am - 130

## 2017-12-15 NOTE — TELEPHONE ENCOUNTER
Spoke w/ Pt's daughter and read the following note: She stated back the dose increase and stated she understood.     Please ask pt to increase her Novolog 4 units with meals.   Continue Lantus 20 units SQ at hs.   Pt can send me blood sugar readings the first week in Jan 2018.   Thanks rose chapman

## 2017-12-21 ENCOUNTER — TELEPHONE (OUTPATIENT)
Dept: FAMILY MEDICINE | Facility: CLINIC | Age: 67
End: 2017-12-21

## 2017-12-21 ENCOUNTER — CARE COORDINATION (OUTPATIENT)
Dept: NEPHROLOGY | Facility: CLINIC | Age: 67
End: 2017-12-21

## 2017-12-21 NOTE — PROGRESS NOTES
Reason for Call    Patient's daughter, Caroline, called asking if it's okay for patient to stop taking gabapentin that was prescribed. It does not seem to be helping her symptoms.    Reviewed with Dr. Basilio who recommended the following:    See if patient is willing to go up on gabapentin to 200 mg BID then 300 mg BID if still not working then ok to stop completely     Left VM for patient's daughter asking her to return call to discuss these recommendations.    Daughter returned call. Confirmed she received these recommendations. She will have patient try 200 mg BID to see how that goes.     Pravin Cordova RN

## 2017-12-21 NOTE — TELEPHONE ENCOUNTER
Reviewed medications with daughter Caroline. Current lisinopril script is     lisinopril (PRINIVIL/ZESTRIL) 40 MG tablet 60 tablet 1 11/13/2017  No   Sig: Take 1 tablet (40 mg) by mouth daily   Class: E-Prescribe   Route: Oral   Order: [id number]   E-Prescribing Status: Receipt confirmed by pharmacy (11/13/2017  9:01 AM CST)       Caroline also had questions about gabapentin, prescribed by nephrologist. Dr. Basilio. Advised daughter to follow up with nephrology clinic with her questions/concerns.     Additionally, Caroline expressed concerns regarding patient's mental functioning, concerns for dementia. Caroline would like this addressed at next appointment with Dr. Jackson. Huddled with Dr. Jackson to inform him of daughter's concerns. Note added to appointment in January.    Closing encounter; no further action needed.    Jaylin Nelson, BSN, RN  [facility]

## 2017-12-21 NOTE — TELEPHONE ENCOUNTER
Reason for Call:  Other call back    Detailed comments: Caroline is calling regarding her mother and has some questions regarding her lisinopril and another one of her medications as she was confused with a dose after her mom got out of the hospital.    Phone Number Patient can be reached at: Home number on file [phone] (home)    Best Time: anytime    Can we leave a detailed message on this number? Not Applicable    Call taken on 12/21/2017 at 11:49 AM by Radha Landeros

## 2017-12-22 ENCOUNTER — OFFICE VISIT (OUTPATIENT)
Dept: ENDOCRINOLOGY | Facility: CLINIC | Age: 67
End: 2017-12-22
Payer: MEDICARE

## 2017-12-22 ENCOUNTER — OFFICE VISIT (OUTPATIENT)
Dept: ORTHOPEDICS | Facility: CLINIC | Age: 67
End: 2017-12-22
Payer: MEDICARE

## 2017-12-22 VITALS — SYSTOLIC BLOOD PRESSURE: 97 MMHG | HEIGHT: 65 IN | DIASTOLIC BLOOD PRESSURE: 56 MMHG | HEART RATE: 78 BPM

## 2017-12-22 DIAGNOSIS — E11.40 TYPE 2 DIABETES MELLITUS WITH DIABETIC NEUROPATHY, WITH LONG-TERM CURRENT USE OF INSULIN (H): ICD-10-CM

## 2017-12-22 DIAGNOSIS — L85.3 XEROSIS CUTIS: Primary | ICD-10-CM

## 2017-12-22 DIAGNOSIS — Z79.4 TYPE 2 DIABETES MELLITUS WITH HYPERGLYCEMIA, WITH LONG-TERM CURRENT USE OF INSULIN (H): Primary | ICD-10-CM

## 2017-12-22 DIAGNOSIS — L84 TYLOMA: ICD-10-CM

## 2017-12-22 DIAGNOSIS — Z79.4 TYPE 2 DIABETES MELLITUS WITH DIABETIC NEUROPATHY, WITH LONG-TERM CURRENT USE OF INSULIN (H): ICD-10-CM

## 2017-12-22 DIAGNOSIS — B35.1 DERMATOPHYTOSIS OF NAIL: ICD-10-CM

## 2017-12-22 DIAGNOSIS — E11.65 TYPE 2 DIABETES MELLITUS WITH HYPERGLYCEMIA, WITH LONG-TERM CURRENT USE OF INSULIN (H): Primary | ICD-10-CM

## 2017-12-22 LAB — HBA1C MFR BLD: 6.8 % (ref 4.3–6)

## 2017-12-22 RX ORDER — UREA 200 MG/G
CREAM TOPICAL DAILY
Qty: 85 G | Refills: 3 | Status: ON HOLD | OUTPATIENT
Start: 2017-12-22 | End: 2018-09-26

## 2017-12-22 ASSESSMENT — PAIN SCALES - GENERAL: PAINLEVEL: NO PAIN (0)

## 2017-12-22 NOTE — PROGRESS NOTES
Chief Complaint:   Chief Complaint   Patient presents with     RECHECK     diabetic foot check          Allergies   Allergen Reactions     Nkda [No Known Drug Allergies]          Subjective: Supriya is a 67 year old female who presents to the clinic today for a diabetic foot exam and management. Since her last appointment here, she was admitted to [facility] with cellulitis of the thigh on the right side. She presents today with her daughter, Caroline. They relate that she spend about 2 weeks in the hospital. She had a fluid collection that was noted in the leg. She was discharged from the hospital on doxycycline p.o. She has finished all of these. She relates to feeling better today, and the leg cellulitis has improved. She has tried be better about her sugars. A1c's are usually above 8. She has not been wearing any compression on the right leg. She also walks in her diabetic shoe without a sock.    Objective    Lab Results   Component Value Date    A1C 9.6 06/09/2017    A1C 6.9 02/14/2017    A1C 8.6 11/21/2016    A1C 11.1 08/25/2016    A1C 9.7 01/21/2016         PT and DP pulses are non-palpable bilaterally. CRT is <3 seconds. Absent pedal hair. Mild non-pitting edema to RLE. Venous stasis discoloration to RLE.   Pinpoint sensation intact to right foot with 10 gram monofilament.  Equinus present bilaterally. No pain with active or passive ROM of the ankle, MTJ, 1st ray, or halluces bilaterally. Left AKA. Severely laterally deviated second digit. Prominent first and second metatarsal heads. Limited active and passive ROM of first - fourth digits at MPJs.  Nails thickened, elongated and discolored with subungual debris bilaterally. No open lesions are noted. Scab noted to lateral right hallux. Skin is very dry. Hyperkeratotic tissue build up noted to plantar first and second metatarsal heads.  No open lesions noted.      Assessment:   - Onychomycosis  - Tyloma  - Foot deformity  - DM type 2, uncontrolled  - Peripheral vascular  disease     Plan:  - Pt seen and evaluated. Diagnosis and treatment options discussed.   - Nails trimmed x 5. Calluses trimmed x 2  - Continue diabetic shoe, but wear socks.  - Continue compression stockings  - Encouraged continued daily moisturizing and foot checks. Rx for Urea cream.   - Encouraged better BS control.  - See again in 10 weeks.

## 2017-12-22 NOTE — MR AVS SNAPSHOT
After Visit Summary   12/22/2017    Supriya Herr    MRN: [id number]           Patient Information     Date Of Birth          1950        Visit Information        Provider Department      12/22/2017 1:40 PM Eleazar Pack DPM [facility]        Today's Diagnoses     Xerosis cutis    -  1    Tyloma           Follow-ups after your visit        Your next 10 appointments already scheduled     Dec 22, 2017  2:30 PM CST   (Arrive by 2:15 PM)   RETURN DIABETES with Arabella Kamara PA-C   [facility] ([facility])    [address] [place] [address]  3rd [facility] [address] [phone]            Jan 02, 2018 11:00 AM CST   New Visit with Zari Thakkar LP   [facility] ([facility]  [address] [place], [place] [id number]  [facility] [id number] [phone]            Jan 09, 2018 10:30 AM CST   [facility] with  [facility]   [facility]    [address] [place] [address]  [facility] [id number] [phone]           Please do not eat 10-12 hours before your appointment if you are coming in fasting for labs on lipids, cholesterol, or glucose (sugar). This does not apply to pregnant women. Water, hot tea and black coffee (with nothing added) are okay. Do not drink other fluids, diet soda or chew gum.            Jan 09, 2018 11:00 AM CST   Office Visit with Noam Jackson MD   [facility]    [address] [place] [address]  [facility] [id number] [phone]           Bring a current list of meds and any records pertaining to this visit. For Physicals, please bring immunization records and any forms needing to be filled out. Please arrive 10 minutes early to complete paperwork.            Jan 24, 2018 10:30 AM CST   Lab with  LAB    [facility] ([facility])    [facility]  [address] [address]   [phone]            Jan 24, 2018 11:30 AM CST   Nurse Visit with  NEPHROLOGY NURSE   [facility] ([facility])    [address] [place] [address]  3rd [facility] [address]   [phone]            Feb 21, 2018 10:45 AM CST   Lab with JENNIFER LAB   [facility] ([facility])    [address] [place] [id number]   [phone]            Feb 21, 2018 11:35 AM CST   (Arrive by 11:05 AM)   Return Visit with Kathryn Basilio MD   [facility] Nephrology ([facility])    [address] [place] [address]   [phone]            Mar 02, 2018 12:40 PM CST   (Arrive by 12:25 PM)   RETURN FOOT/ANKLE with Eleazar Pack DPM   [facility] ([facility])    [address] [place] [id number] [phone]              Who to contact     Please call your clinic at [phone] to:    Ask questions about your health    Make or cancel appointments    Discuss your medicines    Learn about your test results    Speak to your doctor   If you have compliments or concerns about an experience at your clinic, or if you wish to file a complaint, please contact [facility] Patient Relations at [phone] or email us at [email or web address]         Additional Information About Your Visit        Videolicioushart Information     DoorDash is an electronic gateway that provides easy, online access to your medical records. With DoorDash, you can request a clinic appointment, read your test results, renew a prescription or communicate with your care team.     To sign up for DoorDash visit the website at [email or web address]   You will be asked to enter the access code listed below, as well as some personal information. Please follow the  directions to create your username and password.     Your access code is: [email or web address]  Expires: 3/13/2018  4:12 PM     Your access code will  in 90 days. If you need help or a new code, please contact your [facility] or call [phone] for assistance.        Care EveryWhere ID     This is your Care EveryWhere ID. This could be used by other organizations to access your [place] medical records  [id number]         Blood Pressure from Last 3 Encounters:   17 106/62   17 129/57   17 132/68    Weight from Last 3 Encounters:   17 92.1 kg (203 lb)   17 100.7 kg (222 lb 1.6 oz)   17 101.6 kg (224 lb)              Today, you had the following     No orders found for display         Today's Medication Changes          These changes are accurate as of: 17  1:55 PM.  If you have any questions, ask your nurse or doctor.               Start taking these medicines.        Dose/Directions    Urea 20 % Crea cream   Used for:  Xerosis cutis, Tyloma   Started by:  Eleazar Pack DPM        Apply topically daily   Quantity:  85 g   Refills:  3            Where to get your medicines      These medications were sent to [facility]/pharmacy #[id number] [place] [id number]     Phone:  [phone]     Urea 20 % Crea cream                Primary Care Provider Office Phone # Fax #    Noam Luis Jackson [phone]       [address] [address]        Equal Access to Services     BLAIR WARREN : Hadraya lauren Soaleksandar, waaxda luqadaha, qaybta kaalmamadeline augustine. So [facility] [phone].    ATENCIÓN: Si habla español, tiene a santoro disposición servicios gratuitos de asistencia lingüística. Llame al [phone].    We comply with applicable federal civil rights laws and [place] laws. We do not discriminate on the  basis of race, color, national origin, age, disability, sex, sexual orientation, or gender identity.            Thank you!     Thank you for choosing [facility]  for your care. Our goal is always to provide you with excellent care. Hearing back from our patients is one way we can continue to improve our services. Please take a few minutes to complete the written survey that you may receive in the mail after your visit with us. Thank you!             Your Updated Medication List - Protect others around you: Learn how to safely use, store and throw away your medicines at www.disposemymeds.org.          This list is accurate as of: 12/22/17  1:55 PM.  Always use your most recent med list.                   Brand Name Dispense Instructions for use Diagnosis    acetaminophen 325 MG tablet    TYLENOL    100 tablet    Take 2 tablets (650 mg) by mouth 4 times daily    Cellulitis of right leg       albuterol 108 (90 BASE) MCG/ACT Inhaler    PROAIR HFA/PROVENTIL HFA/VENTOLIN HFA    3 Inhaler    Inhale 2 puffs into the lungs every 6 hours as needed for shortness of breath / dyspnea or wheezing    Cough       amLODIPine 10 MG tablet    NORVASC    90 tablet    Take 1 tablet (10 mg) by mouth daily    Essential hypertension with goal blood pressure less than 140/90       ASPIRIN NOT PRESCRIBED    INTENTIONAL    0 each    1 each continuous prn Antiplatelet medication not prescribed intentionally due to current nosebleeds    Anemia due to blood loss, acute       atorvastatin 20 MG tablet    LIPITOR    90 tablet    Take 1 tablet (20 mg) by mouth daily    Hyperlipidemia LDL goal <100       * blood glucose monitoring test strip    ONETOUCH ULTRA    200 strip    Use to test blood sugars 2 times daily or as directed.    Type 2 diabetes mellitus with stage 3 chronic kidney disease, without long-term current use of insulin (H)       * blood glucose monitoring test strip    ONETOUCH ULTRA    200 strip    Test your blood  sugar 3-4 times per day.    Type 2 diabetes mellitus with hyperglycemia, with long-term current use of insulin (H)       carvedilol 25 MG tablet    COREG    180 tablet    Take 1 tablet (25 mg) by mouth 2 times daily (with meals)    Essential hypertension with goal blood pressure less than 140/90       doxycycline 100 MG tablet    VIBRA-TABS    28 tablet    TAKE 1 TABLET (100 MG) BY MOUTH 2 TIMES DAILY    Cellulitis of right leg       DULoxetine 30 MG EC capsule    CYMBALTA    180 capsule    Take 1 capsule (30 mg) by mouth 2 times daily    Type 2 diabetes mellitus with diabetic nephropathy, with long-term current use of insulin (H), Diabetic polyneuropathy associated with diabetes mellitus due to underlying condition (H)       furosemide 80 MG tablet    LASIX    180 tablet    Take 1 tablet (80 mg) by mouth 2 times daily Patient taking 80mg in AM, 40mg in PM    Lymphedema of both lower extremities       gabapentin 100 MG capsule    NEURONTIN    60 capsule    Take 2 capsules (200 mg) by mouth At Bedtime    Diabetic polyneuropathy associated with type 2 diabetes mellitus (H)       hydrALAZINE 100 MG Tabs tablet    APRESOLINE    180 tablet    Take 0.5 tablets (50 mg) by mouth 2 times daily    Hypertension goal BP (blood pressure) < 140/90       insulin glargine 100 UNIT/ML injection    LANTUS    3 mL    Inject 20 Units Subcutaneous At Bedtime    Type 2 diabetes mellitus with diabetic neuropathy, with long-term current use of insulin (H)       insulin pen needle 31G X 6 MM    ULTICARE MINI    100 each    Use daily or as directed.    Type 2 diabetes, HbA1c goal < 7% (H)       lactobacillus rhamnosus (GG) capsule     60 capsule    Take 1 capsule by mouth 2 times daily    Cellulitis of right leg       lisinopril 40 MG tablet    PRINIVIL/ZESTRIL    60 tablet    Take 1 tablet (40 mg) by mouth daily    Hypertension goal BP (blood pressure) < 140/90       miconazole 2 % powder    MICATIN; MICRO GUARD    71 g    Apply topically 2  times daily    Fungal dermatitis       MULTIVITAMIN PO      1 tablet by mouth daily        NovoLOG FLEXPEN 100 UNIT/ML injection   Generic drug:  insulin aspart     15 mL    Inject 4 units SQ with breakfast, lunch and dinner.    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    Edema of lower extremity       spironolactone 25 MG tablet    ALDACTONE    60 tablet    Take 1 tablet (25 mg) by mouth daily    Hypertension goal BP (blood pressure) < 140/90       tolterodine 2 MG 24 hr capsule    DETROL LA    60 capsule    TAKE 1 CAPSULE (2 MG) BY MOUTH DAILY    Urge incontinence of urine       Urea 20 % Crea cream     85 g    Apply topically daily    Xerosis cutis, Tyloma       * Notice:  This list has 6 medication(s) that are the same as other medications prescribed for you. Read the directions carefully, and ask your doctor or other care provider to review them with you.
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
## 2017-12-22 NOTE — MR AVS SNAPSHOT
After Visit Summary   12/22/2017    Supriya Herr    MRN: [id number]           Patient Information     Date Of Birth          1950        Visit Information        Provider Department      12/22/2017 2:30 PM Arabella Kamara PA-C [facility] Endocrinology        Today's Diagnoses     Type 2 diabetes mellitus with hyperglycemia, with long-term current use of insulin (H)    -  1       Follow-ups after your visit        Your next 10 appointments already scheduled     Jan 02, 2018 11:00 AM CST   New Visit with Zari Thakkar LP   [facility]  [address]  [facility] [id number] [phone]            Jan 09, 2018 10:30 AM CST   LAB with  LAB   [facility]    [address] [place] [address]  [facility] [id number] [phone]           Please do not eat 10-12 hours before your appointment if you are coming in fasting for labs on lipids, cholesterol, or glucose (sugar). This does not apply to pregnant women. Water, hot tea and black coffee (with nothing added) are okay. Do not drink other fluids, diet soda or chew gum.            Jan 09, 2018 11:00 AM CST   Office Visit with Noam Jackson MD   [facility]    [address] [place] [address]  [facility] [id number] [phone]           Bring a current list of meds and any records pertaining to this visit. For Physicals, please bring immunization records and any forms needing to be filled out. Please arrive 10 minutes early to complete paperwork.            Jan 24, 2018 10:30 AM CST   Lab with JENNIFER [facility]    [facility] ([facility])    [address] [facility] [address] [address]  1st Floor  [facility] [id number] [phone]            Jan 24, 2018 11:30 AM CST   Nurse Visit with  NEPHROLOGY NURSE   [facility] Solid Organ Transplant ([facility]  [facility])    [address] [place] [id number]   [phone]            Feb 21, 2018 10:45 AM CST   Lab with JENNIFER LAB   [facility] ([facility])    [address] [place] [address]   [phone]            Feb 21, 2018 11:35 AM CST   (Arrive by 11:05 AM)   Return Visit with Kathryn Basilio MD   [facility] Nephrology ([facility])    [address] [place] [address]   [phone]            Mar 02, 2018 11:30 AM CST   (Arrive by 11:15 AM)   RETURN DIABETES with Arabella Kamara PA-C   [facility] Endocrinology ([facility])    [address] [place] [id number] [phone]            Mar 02, 2018 12:40 PM CST   (Arrive by 12:25 PM)   RETURN FOOT/ANKLE with Eleazar Pack DPM   [facility] ([facility])    [address] [place] [id number] [phone]              Who to contact     Please call your clinic at [phone] to:    Ask questions about your health    Make or cancel appointments    Discuss your medicines    Learn about your test results    Speak to your doctor   If you have compliments or concerns about an experience at your clinic, or if you wish to file a complaint, please contact [facility] Patient Relations at [phone] or email us at [email or web address]         Additional Information About Your Visit        [email or web address] Information     [employer] is an electronic gateway that provides easy, online access to your medical records. With [employer], you can request a clinic appointment, read your test results, renew a prescription or communicate with your care team.     To sign up for [employer] visit the website at [email or web address]   You will be asked to enter the access code listed below, as well  "as some personal information. Please follow the directions to create your username and password.     Your access code is: [email or web address]  Expires: 3/13/2018  4:12 PM     Your access code will  in 90 days. If you need help or a new code, please contact your [facility] or call [phone] for assistance.        Care EveryWhere ID     This is your Care EveryWhere ID. This could be used by other organizations to access your [facility] medical records  [id number]        Your Vitals Were     Pulse Height                78 1.651 m (5' 5\")           Blood Pressure from Last 3 Encounters:   17 97/56   17 106/62   17 129/57    Weight from Last 3 Encounters:   17 92.1 kg (203 lb)   17 100.7 kg (222 lb 1.6 oz)   17 101.6 kg (224 lb)              Today, you had the following     No orders found for display         Today's Medication Changes          These changes are accurate as of: 17  3:57 PM.  If you have any questions, ask your nurse or doctor.               Start taking these medicines.        Dose/Directions    Urea 20 % Crea cream   Used for:  Xerosis cutis, Tyloma   Started by:  Eleazar Pack DPM        Apply topically daily   Quantity:  85 g   Refills:  3         Stop taking these medicines if you haven't already. Please contact your care team if you have questions.     gabapentin 100 MG capsule   Commonly known as:  NEURONTIN   Stopped by:  Arabella Kamara PA-C                Where to get your medicines      These medications [facility] to [id number] [address] [facility]  [address] [place] [address]     Phone:  [phone]     Urea 20 % Crea cream                Primary Care Provider Office Phone # Fax #    Noam Jackson [phone]       [address] [address]        Equal Access to Services     KALYANI WARREN AH: Emeterio lauren " Carole, albinada luqadaha, qaybta kaalemily parisi, madeline bustilloheather fara. So [facility] [phone].    ATENCIÓN: Si carolina ochoa, tiene a santoro disposición servicios gratuitos de asistencia lingüística. Renuka al [phone].    We comply with applicable federal civil rights laws and [place] laws. We do not discriminate on the basis of race, color, national origin, age, disability, sex, sexual orientation, or gender identity.            Thank you!     Thank you for choosing [facility]  for your care. Our goal is always to provide you with excellent care. Hearing back from our patients is one way we can continue to improve our services. Please take a few minutes to complete the written survey that you may receive in the mail after your visit with us. Thank you!             Your Updated Medication List - Protect others around you: Learn how to safely use, store and throw away your medicines at www.disposemymeds.org.          This list is accurate as of: 12/22/17  3:57 PM.  Always use your most recent med list.                   Brand Name Dispense Instructions for use Diagnosis    acetaminophen 325 MG tablet    TYLENOL    100 tablet    Take 2 tablets (650 mg) by mouth 4 times daily    Cellulitis of right leg       albuterol 108 (90 BASE) MCG/ACT Inhaler    PROAIR HFA/PROVENTIL HFA/VENTOLIN HFA    3 Inhaler    Inhale 2 puffs into the lungs every 6 hours as needed for shortness of breath / dyspnea or wheezing    Cough       amLODIPine 10 MG tablet    NORVASC    90 tablet    Take 1 tablet (10 mg) by mouth daily    Essential hypertension with goal blood pressure less than 140/90       ASPIRIN NOT PRESCRIBED    INTENTIONAL    0 each    1 each continuous prn Antiplatelet medication not prescribed intentionally due to current nosebleeds    Anemia due to blood loss, acute       atorvastatin 20 MG tablet    LIPITOR    90 tablet    Take 1 tablet (20 mg) by mouth daily    Hyperlipidemia LDL goal  <100       * blood glucose monitoring test strip    ONETOUCH ULTRA    200 strip    Use to test blood sugars 2 times daily or as directed.    Type 2 diabetes mellitus with stage 3 chronic kidney disease, without long-term current use of insulin (H)       * blood glucose monitoring test strip    ONETOUCH ULTRA    200 strip    Test your blood sugar 3-4 times per day.    Type 2 diabetes mellitus with hyperglycemia, with long-term current use of insulin (H)       carvedilol 25 MG tablet    COREG    180 tablet    Take 1 tablet (25 mg) by mouth 2 times daily (with meals)    Essential hypertension with goal blood pressure less than 140/90       doxycycline 100 MG tablet    VIBRA-TABS    28 tablet    TAKE 1 TABLET (100 MG) BY MOUTH 2 TIMES DAILY    Cellulitis of right leg       DULoxetine 30 MG EC capsule    CYMBALTA    180 capsule    Take 1 capsule (30 mg) by mouth 2 times daily    Type 2 diabetes mellitus with diabetic nephropathy, with long-term current use of insulin (H), Diabetic polyneuropathy associated with diabetes mellitus due to underlying condition (H)       furosemide 80 MG tablet    LASIX    180 tablet    Take 1 tablet (80 mg) by mouth 2 times daily Patient taking 80mg in AM, 40mg in PM    Lymphedema of both lower extremities       hydrALAZINE 100 MG Tabs tablet    APRESOLINE    180 tablet    Take 0.5 tablets (50 mg) by mouth 2 times daily    Hypertension goal BP (blood pressure) < 140/90       insulin glargine 100 UNIT/ML injection    LANTUS    3 mL    Inject 20 Units Subcutaneous At Bedtime    Type 2 diabetes mellitus with diabetic neuropathy, with long-term current use of insulin (H)       insulin pen needle 31G X 6 MM    ULTICARE MINI    100 each    Use daily or as directed.    Type 2 diabetes, HbA1c goal < 7% (H)       lactobacillus rhamnosus (GG) capsule     60 capsule    Take 1 capsule by mouth 2 times daily    Cellulitis of right leg       lisinopril 40 MG tablet    PRINIVIL/ZESTRIL    60 tablet    Take  1 tablet (40 mg) by mouth daily    Hypertension goal BP (blood pressure) < 140/90       miconazole 2 % powder    MICATIN; MICRO GUARD    71 g    Apply topically 2 times daily    Fungal dermatitis       MULTIVITAMIN PO      1 tablet by mouth daily        NovoLOG FLEXPEN 100 UNIT/ML injection   Generic drug:  insulin aspart     15 mL    Inject 4 units SQ with breakfast, lunch and dinner.    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    Edema of lower extremity       spironolactone 25 MG tablet    ALDACTONE    60 tablet    Take 1 tablet (25 mg) by mouth daily    Hypertension goal BP (blood pressure) < 140/90       tolterodine 2 MG 24 hr capsule    DETROL LA    60 capsule    TAKE 1 CAPSULE (2 MG) BY MOUTH DAILY    Urge incontinence of urine       Urea 20 % Crea cream     85 g    Apply topically daily    Xerosis cutis, Tyloma       * Notice:  This list has 6 medication(s) that are the same as other medications prescribed for you. Read the directions carefully, and ask your doctor or other care provider to review them with you.

## 2017-12-22 NOTE — NURSING NOTE
Chief Complaint   Patient presents with     RECHECK     diabetic foot check       Pain Assessment  Patient Currently in Pain: Denies               Allergies   Allergen Reactions     Nkda [No Known Drug Allergies]        Current Outpatient Prescriptions   Medication Sig Dispense Refill     NOVOLOG FLEXPEN 100 UNIT/ML soln Inject 4 units SQ with breakfast, lunch and dinner. 15 mL 3     hydrALAZINE (APRESOLINE) 100 MG TABS tablet Take 0.5 tablets (50 mg) by mouth 2 times daily 180 tablet 1     doxycycline (VIBRA-TABS) 100 MG tablet TAKE 1 TABLET (100 MG) BY MOUTH 2 TIMES DAILY 28 tablet 0     order for DME Equipment being ordered: Right Lower extremity Solaris Ready wrap calf piece:  Size small/length tall , knee piece: Size small , Thigh piece size small/length average. 1 Units 0     blood glucose monitoring (ONETOUCH ULTRA) test strip Test your blood sugar 3-4 times per day. 200 strip 3     acetaminophen (TYLENOL) 325 MG tablet Take 2 tablets (650 mg) by mouth 4 times daily 100 tablet      insulin glargine (LANTUS) 100 UNIT/ML injection Inject 20 Units Subcutaneous At Bedtime 3 mL 1     lactobacillus rhamnosus, GG, (CULTURELL) capsule Take 1 capsule by mouth 2 times daily 60 capsule 0     lisinopril (PRINIVIL/ZESTRIL) 40 MG tablet Take 1 tablet (40 mg) by mouth daily 60 tablet 1     miconazole (MICATIN; MICRO GUARD) 2 % powder Apply topically 2 times daily 71 g 0     spironolactone (ALDACTONE) 25 MG tablet Take 1 tablet (25 mg) by mouth daily 60 tablet 1     order for DME 1 wheelchair 1 Device 0     tolterodine (DETROL LA) 2 MG 24 hr capsule TAKE 1 CAPSULE (2 MG) BY MOUTH DAILY 60 capsule 3     DULoxetine (CYMBALTA) 30 MG EC capsule Take 1 capsule (30 mg) by mouth 2 times daily 180 capsule 3     furosemide (LASIX) 80 MG tablet Take 1 tablet (80 mg) by mouth 2 times daily Patient taking 80mg in AM, 40mg in  tablet 3     blood glucose monitoring (ONE TOUCH ULTRA) test strip Use to test blood sugars 2 times daily  or as directed. 200 strip 3     atorvastatin (LIPITOR) 20 MG tablet Take 1 tablet (20 mg) by mouth daily 90 tablet 3     carvedilol (COREG) 25 MG tablet Take 1 tablet (25 mg) by mouth 2 times daily (with meals) 180 tablet 3     amLODIPine (NORVASC) 10 MG tablet Take 1 tablet (10 mg) by mouth daily 90 tablet 3     order for DME Equipment being ordered: TEDS stocking   Below the knee 15-20 mg  Dispense 2  Use daily 2 Device 1     albuterol (PROAIR HFA, PROVENTIL HFA, VENTOLIN HFA) 108 (90 BASE) MCG/ACT inhaler Inhale 2 puffs into the lungs every 6 hours as needed for shortness of breath / dyspnea or wheezing 3 Inhaler 1     insulin pen needle (ULTICARE MINI) 31G X 6 MM Use daily or as directed. 100 each prn     ORDER FOR DME Equipment being ordered: Compression stockings, 20-30 MMHG, knee high 1 Device 0     ASPIRIN NOT PRESCRIBED, INTENTIONAL, 1 each continuous prn Antiplatelet medication not prescribed intentionally due to current nosebleeds 0 each 0     MULTIVITAMIN OR 1 tablet by mouth daily       gabapentin (NEURONTIN) 100 MG capsule Take 2 capsules (200 mg) by mouth At Bedtime (Patient not taking: Reported on 12/22/2017) 60 capsule 11       Shayna Ramsey  12/22/2017

## 2017-12-22 NOTE — LETTER
12/22/2017       RE: Supriya Herr  [address]  [facility] [address]     Dear Colleague,    Thank you for referring your patient, Supriya Herr, to the [facility] at [facility]. Please see a copy of my visit note below.    Chief Complaint:   Chief Complaint   Patient presents with     RECHECK     diabetic foot check          Allergies   Allergen Reactions     Nkda [No Known Drug Allergies]          Subjective: Supriya is a 67 year old female who presents to the clinic today for a diabetic foot exam and management. Since her last appointment here, she was admitted to [facility] with cellulitis of the thigh on the right side. She presents today with her daughter, Caroline. They relate that she spend about 2 weeks in the hospital. She had a fluid collection that was noted in the leg. She was discharged from the hospital on doxycycline p.o. She has finished all of these. She relates to feeling better today, and the leg cellulitis has improved. She has tried be better about her sugars. A1c's are usually above 8. She has not been wearing any compression on the right leg. She also walks in her diabetic shoe without a sock.    Objective    Lab Results   Component Value Date    A1C 9.6 06/09/2017    A1C 6.9 02/14/2017    A1C 8.6 11/21/2016    A1C 11.1 08/25/2016    A1C 9.7 01/21/2016         PT and DP pulses are non-palpable bilaterally. CRT is <3 seconds. Absent pedal hair. Mild non-pitting edema to RLE. Venous stasis discoloration to RLE.   Pinpoint sensation intact to right foot with 10 gram monofilament.  Equinus present bilaterally. No pain with active or passive ROM of the ankle, MTJ, 1st ray, or halluces bilaterally. Left AKA. Severely laterally deviated second digit. Prominent first and second metatarsal heads. Limited active and passive ROM of first - fourth digits at MPJs.  Nails thickened, elongated and discolored with subungual debris bilaterally. No open  lesions are noted. Scab noted to lateral right hallux. Skin is very dry. Hyperkeratotic tissue build up noted to plantar first and second metatarsal heads.  No open lesions noted.      Assessment:   - Onychomycosis  - Tyloma  - Foot deformity  - DM type 2, uncontrolled  - Peripheral vascular disease     Plan:  - Pt seen and evaluated. Diagnosis and treatment options discussed.   - Nails trimmed x 5. Calluses trimmed x 2  - Continue diabetic shoe, but wear socks.  - Continue compression stockings  - Encouraged continued daily moisturizing and foot checks. Rx for Urea cream.   - Encouraged better BS control.  - See again in 10 weeks.    Again, thank you for allowing me to participate in the care of your patient.      Sincerely,    Eleazar Pack DPM
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
## 2017-12-27 ENCOUNTER — CARE COORDINATION (OUTPATIENT)
Dept: NEPHROLOGY | Facility: CLINIC | Age: 67
End: 2017-12-27

## 2017-12-27 NOTE — PROGRESS NOTES
Called patient's daughter, Caroline, to review patient's home BPs since decreasing her hydralazine after her last appointment.  Caroline does not have the home BP readings with her at this time, but will call me back later today to review.    Pravin Cordova RN

## 2017-12-27 NOTE — PROGRESS NOTES
Received call back from patient and her daughter. They report the following BPs over the last 5 days:     12/23: 134/64  12/24: 171/74  12/25: 160/71  12/26: 125/81  12/27: 130/61    Confirmed that patient decreased hydralazine dose to 50 mg BID due to hypotension at her recent appointment with Dr. Basilio on 12/13.     Patient's daughter is worried that patient's home cuff may not be accurate, because patient's home readings are higher than what we've been getting in clinic (BP was 97/56 at endo appointment on 97/56).     I advised that they bring the home cuff to their nurse visit with me in January so we can see how the readings compare. Also offered a sooner nurse visit if needed, but patient and daughter declined. They'll also bring her cuff to an appointment with her PCP on 1/9.     Patient has not had any dizziness or orthostatic symptoms.     Update sent to Dr. Basilio. I will follow up with patient again in 2-3 weeks.    Pravin Cordova RN

## 2018-01-05 NOTE — PROGRESS NOTES
SUBJECTIVE:   Supriya Herr is a 67 year old female who presents to clinic today for the following health issues:      Diabetes Follow-up    Patient is checking blood sugars: twice daily.    Blood sugar testing frequency justification: Adjustment of medication(s)  Results are as follows:       am - 122 today       lunchtime - varies 150-400       suppertime - Varies 150-400        Diabetic concerns: No changes      Symptoms of hypoglycemia (low blood sugar): shaky, tingling- doesn't happen very often      Paresthesias (numbness or burning in feet) or sores: No     Date of last diabetic eye exam: 5/2017?   BP Readings from Last 2 Encounters:   12/22/17 97/56   12/13/17 106/62     Hemoglobin A1C (%)   Date Value   06/09/2017 9.6 (H)   02/14/2017 6.9 (H)     LDL Cholesterol Calculated (mg/dL)   Date Value   09/21/2017 68   08/03/2016 185 (H)         Amount of exercise or physical activity: None    Problems taking medications regularly: Yes,  problems remembering to take    Medication side effects: none    Diet: Diabetic diet         Hypertension Follow-up      Outpatient blood pressures are being checked at home.  Results are 100/50-60.    Low Salt Diet: no added salt    Chronic Kidney Disease Follow-up      Current NSAID use?  No    Seeing nephrology        Problem list and histories reviewed & adjusted, as indicated.  Additional history: as documented    Labs reviewed in EPIC    Reviewed and updated as needed this visit by clinical staff     Reviewed and updated as needed this visit by Provider         ROS:  Constitutional, HEENT, cardiovascular, pulmonary, gi and gu systems are negative, except as otherwise noted.      OBJECTIVE:   BP (!) 83/40  Pulse 62  Wt 198 lb 8 oz (90 kg)  SpO2 97%  BMI 33.03 kg/m2  Body mass index is 33.03 kg/(m^2).  GENERAL: alert, frail, elderly and fatigued  NECK: no adenopathy, no asymmetry, masses, or scars and thyroid normal to palpation  RESP: lungs clear to auscultation - no  rales, rhonchi or wheezes  CV: regular rates and rhythm, normal S1 S2, no S3 or S4 and no peripheral edema  ABDOMEN: soft, nontender, no hepatosplenomegaly, no masses and bowel sounds normal  NEURO: Normal strength and tone, mentation intact and speech normal    Diagnostic Test Results:  Results for orders placed or performed in visit on 01/09/18   CBC with platelets   Result Value Ref Range    WBC 5.5 4.0 - 11.0 10e9/L    RBC Count 3.11 (L) 3.8 - 5.2 10e12/L    Hemoglobin 9.1 (L) 11.7 - 15.7 g/dL    Hematocrit 28.4 (L) 35.0 - 47.0 %    MCV 91 78 - 100 fl    MCH 29.3 26.5 - 33.0 pg    MCHC 32.0 31.5 - 36.5 g/dL    RDW 15.0 10.0 - 15.0 %    Platelet Count 311 150 - 450 10e9/L   Hemoglobin A1c   Result Value Ref Range    Hemoglobin A1C 6.8 (H) 4.3 - 6.0 %     *Note: Due to a large number of results and/or encounters for the requested time period, some results have not been displayed. A complete set of results can be found in Results Review.       ASSESSMENT/PLAN:             1. Type 2 diabetes mellitus with stage 3 chronic kidney disease, without long-term current use of insulin (H)  Well controlled   - Albumin Random Urine Quantitative with Creat Ratio  - TSH WITH FREE T4 REFLEX  - amLODIPine (NORVASC) 5 MG tablet; Take 1 tablet (5 mg) by mouth daily  Dispense: 30 tablet; Refill: 1  - CBC with platelets  - Hemoglobin A1c    2. Feeling light headed  Likely over treated HTN  Cut back norvasc, check BP call me in 1 wek    3. Other fatigue  recheck  - Albumin Random Urine Quantitative with Creat Ratio  - TSH WITH FREE T4 REFLEX  - amLODIPine (NORVASC) 5 MG tablet; Take 1 tablet (5 mg) by mouth daily  Dispense: 30 tablet; Refill: 1  - CBC with platelets  - Hemoglobin A1c    4. Hypertension goal BP (blood pressure) < 140/90  As above, continue as per nephrologist but cut back norvasc    5. Anemia of chronic renal failure, stage 4 (severe) (H)  stable      See Patient Instructions    Noam Jackson MD  [place]  [facility]

## 2018-01-09 ENCOUNTER — OFFICE VISIT (OUTPATIENT)
Dept: FAMILY MEDICINE | Facility: CLINIC | Age: 68
End: 2018-01-09
Payer: MEDICARE

## 2018-01-09 VITALS
WEIGHT: 198.5 LBS | SYSTOLIC BLOOD PRESSURE: 83 MMHG | OXYGEN SATURATION: 97 % | DIASTOLIC BLOOD PRESSURE: 40 MMHG | BODY MASS INDEX: 33.03 KG/M2 | HEART RATE: 62 BPM

## 2018-01-09 DIAGNOSIS — R42 FEELING LIGHT HEADED: ICD-10-CM

## 2018-01-09 DIAGNOSIS — R53.83 OTHER FATIGUE: ICD-10-CM

## 2018-01-09 DIAGNOSIS — D63.1 ANEMIA OF CHRONIC RENAL FAILURE, STAGE 4 (SEVERE) (H): ICD-10-CM

## 2018-01-09 DIAGNOSIS — N18.4 ANEMIA OF CHRONIC RENAL FAILURE, STAGE 4 (SEVERE) (H): ICD-10-CM

## 2018-01-09 DIAGNOSIS — E11.22 TYPE 2 DIABETES MELLITUS WITH STAGE 3 CHRONIC KIDNEY DISEASE, WITHOUT LONG-TERM CURRENT USE OF INSULIN (H): Primary | ICD-10-CM

## 2018-01-09 DIAGNOSIS — I10 HYPERTENSION GOAL BP (BLOOD PRESSURE) < 140/90: ICD-10-CM

## 2018-01-09 DIAGNOSIS — N18.30 TYPE 2 DIABETES MELLITUS WITH STAGE 3 CHRONIC KIDNEY DISEASE, WITHOUT LONG-TERM CURRENT USE OF INSULIN (H): Primary | ICD-10-CM

## 2018-01-09 LAB
CREAT UR-MCNC: 48 MG/DL
ERYTHROCYTE [DISTWIDTH] IN BLOOD BY AUTOMATED COUNT: 15 % (ref 10–15)
HBA1C MFR BLD: 6.8 % (ref 4.3–6)
HCT VFR BLD AUTO: 28.4 % (ref 35–47)
HGB BLD-MCNC: 9.1 G/DL (ref 11.7–15.7)
MCH RBC QN AUTO: 29.3 PG (ref 26.5–33)
MCHC RBC AUTO-ENTMCNC: 32 G/DL (ref 31.5–36.5)
MCV RBC AUTO: 91 FL (ref 78–100)
MICROALBUMIN UR-MCNC: 2880 MG/L
MICROALBUMIN/CREAT UR: 6037.74 MG/G CR (ref 0–25)
PLATELET # BLD AUTO: 311 10E9/L (ref 150–450)
RBC # BLD AUTO: 3.11 10E12/L (ref 3.8–5.2)
WBC # BLD AUTO: 5.5 10E9/L (ref 4–11)

## 2018-01-09 PROCEDURE — 85027 COMPLETE CBC AUTOMATED: CPT | Performed by: FAMILY MEDICINE

## 2018-01-09 PROCEDURE — 82043 UR ALBUMIN QUANTITATIVE: CPT | Performed by: FAMILY MEDICINE

## 2018-01-09 PROCEDURE — 99214 OFFICE O/P EST MOD 30 MIN: CPT | Performed by: FAMILY MEDICINE

## 2018-01-09 PROCEDURE — 83036 HEMOGLOBIN GLYCOSYLATED A1C: CPT | Performed by: FAMILY MEDICINE

## 2018-01-09 PROCEDURE — 84443 ASSAY THYROID STIM HORMONE: CPT | Performed by: FAMILY MEDICINE

## 2018-01-09 PROCEDURE — 36415 COLL VENOUS BLD VENIPUNCTURE: CPT | Performed by: FAMILY MEDICINE

## 2018-01-09 RX ORDER — AMLODIPINE BESYLATE 5 MG/1
5 TABLET ORAL DAILY
Qty: 30 TABLET | Refills: 1 | Status: SHIPPED | OUTPATIENT
Start: 2018-01-09 | End: 2018-02-21

## 2018-01-09 NOTE — MR AVS SNAPSHOT
After Visit Summary   1/9/2018    Supriya Herr    MRN: [id number]           Patient Information     Date Of Birth          1950        Visit Information        Provider Department      1/9/2018 11:00 AM Noam Jackson MD [facility]        Today's Diagnoses     Type 2 diabetes mellitus with stage 3 chronic kidney disease, without long-term current use of insulin (H)    -  1    Feeling light headed        Other fatigue        Hypertension goal BP (blood pressure) < 140/90        Anemia of chronic renal failure, stage 4 (severe) (H)           Follow-ups after your visit        Your next 10 appointments already scheduled     Jan 24, 2018 10:30 AM CST   Lab with  LAB    [facility] ([facility])    [address]  1st Floor  [facility] [address] [phone]            Jan 24, 2018 11:30 AM CST   Nurse Visit with  NEPHROLOGY NURSE   [facility] Solid Organ Transplant ([facility])    [address]  [address]  [facility] [id number] [phone]            Feb 06, 2018 11:00 AM CST   New Visit with Zari Thakkar LP   [facility] ([facility])    [facility]  [address]  [facility] [address]   [phone]            Feb 16, 2018  3:00 PM CST   Return Visit with Zari Thakkar LP   [facility] ([facility])    [facility]  [address]  [facility] [address]   [phone]            Feb 21, 2018 10:45 AM CST   Lab with  LAB    Health Lab ([facility])    [address]  1st Floor  [facility] [address]   [phone]            Feb 21, 2018 11:35 AM CST   (Arrive by 11:05 AM)   Return Visit with Kathryn Basilio MD   [facility] Nephrology ([facility])    [address]  [address]  [facility] [address] [phone]  "           Mar 02, 2018 11:30 AM CST   (Arrive by 11:15 AM)   RETURN DIABETES with Arabella Kamara PA-C   [facility] Endocr[facility]ology ([facility])    [address] [place] [address]  [address] [facility] [address] [phone]            Mar 02, 2018 12:40 PM CST   (Arrive by 12:25 PM)   RETURN FOOT/ANKLE with Eleazar Pack DPM   [facility] ([facility])    [address] [place] [address]  [address] [facility] [address] [phone]              Who to contact     If you have questions or need follow up information about today's clinic visit or your schedule please contact [facility] directly at [phone].  Normal or non-critical lab and imaging results will be communicated to you by MyChart, letter or phone within 4 business days after the clinic has received the results. If you do not hear from us within 7 days, please contact the clinic through MyChart or phone. If you have a critical or abnormal lab result, we will notify you by phone as soon as possible.  Submit refill requests through One[employer] or call your pharmacy and they will forward the refill request to us. Please allow 3 business days for your refill to be completed.          Additional Information About Your Visit        OneClass Information     One[employer] lets you send messages to your doctor, view your test results, renew your prescriptions, schedule appointments and more. To sign up, go to [email or web address] . Click on \"Log in\" on the left side of the screen, which will take you to the Welcome page. Then click on \"Sign up Now\" on the right side of the page.     You will be asked to enter the access code listed below, as well as some personal information. Please follow the directions to create your username and password.     Your access code is: [email or web address]  Expires: 3/13/2018  4:12 PM     Your access code will  in 90 days. If you need help or a new code, " please call your [facility] clinic or [phone].        Care EveryWhere ID     This is your Care EveryWhere ID. This could be used by other organizations to access your [facility] medical records  [id number]        Your Vitals Were     Pulse Pulse Oximetry BMI (Body Mass Index)             62 97% 33.03 kg/m2          Blood Pressure from Last 3 Encounters:   01/09/18 (!) 83/40   12/22/17 97/56   12/13/17 106/62    Weight from Last 3 Encounters:   01/09/18 198 lb 8 oz (90 kg)   12/13/17 203 lb (92.1 kg)   11/09/17 222 lb 1.6 oz (100.7 kg)              We Performed the Following     Albumin Random Urine Quantitative with Creat Ratio     CBC with platelets     Hemoglobin A1c     TSH WITH FREE T4 REFLEX          Today's Medication Changes          These changes are accurate as of: 1/9/18 12:18 PM.  If you have any questions, ask your nurse or doctor.               These medicines have changed or have updated prescriptions.        Dose/Directions    amLODIPine 5 MG tablet   Commonly known as:  NORVASC   This may have changed:    - medication strength  - how much to take   Used for:  Type 2 diabetes mellitus with stage 3 chronic kidney disease, without long-term current use of insulin (H), Other fatigue   Changed by:  Noam Jackson MD        Dose:  5 mg   Take 1 tablet (5 mg) by mouth daily   Quantity:  30 tablet   Refills:  1         Stop taking these medicines if you haven't already. Please contact your care team if you have questions.     doxycycline 100 MG tablet   Commonly known as:  VIBRA-TABS   Stopped by:  Noam Jackson MD                Where to get your medicines      These medications were sent to [facility]/pharmacy #[id number] [place], [place]  [address] [address]     Phone:  [phone]     amLODIPine 5 MG tablet                Primary Care Provider Office Phone # Fax #    Noam Jackson [phone]       [address]  [facility] [address]  [facility] [address]        Equal Access to Services     KALYANI WARREN : Hadii aad ku hadmicaeladebbie Odettealeksandar, waulyssesda curtissulmaha, qaanivalta larissamamadeline augustine. So [facility] [phone].    ATENCIÓN: Si habla español, tiene a santoro disposición servicios gratuitos de asistencia lingüística. Renuka al [phone].    We comply with applicable federal civil rights laws and [place] laws. We do not discriminate on the basis of race, color, national origin, age, disability, sex, sexual orientation, or gender identity.            Thank you!     Thank you for choosing [facility]  for your care. Our goal is always to provide you with excellent care. Hearing back from our patients is one way we can continue to improve our services. Please take a few minutes to complete the written survey that you may receive in the mail after your visit with us. Thank you!             Your Updated Medication List - Protect others around you: Learn how to safely use, store and throw away your medicines at www.disposemymeds.org.          This list is accurate as of: 1/9/18 12:18 PM.  Always use your most recent med list.                   Brand Name Dispense Instructions for use Diagnosis    acetaminophen 325 MG tablet    TYLENOL    100 tablet    Take 2 tablets (650 mg) by mouth 4 times daily    Cellulitis of right leg       albuterol 108 (90 BASE) MCG/ACT Inhaler    PROAIR HFA/PROVENTIL HFA/VENTOLIN HFA    3 Inhaler    Inhale 2 puffs into the lungs every 6 hours as needed for shortness of breath / dyspnea or wheezing    Cough       amLODIPine 5 MG tablet    NORVASC    30 tablet    Take 1 tablet (5 mg) by mouth daily    Type 2 diabetes mellitus with stage 3 chronic kidney disease, without long-term current use of insulin (H), Other fatigue       ASPIRIN NOT PRESCRIBED    INTENTIONAL    0 each    1 each continuous prn Antiplatelet medication not prescribed intentionally due to current nosebleeds     Anemia due to blood loss, acute       atorvastatin 20 MG tablet    LIPITOR    90 tablet    Take 1 tablet (20 mg) by mouth daily    Hyperlipidemia LDL goal <100       * blood glucose monitoring test strip    ONETOUCH ULTRA    200 strip    Use to test blood sugars 2 times daily or as directed.    Type 2 diabetes mellitus with stage 3 chronic kidney disease, without long-term current use of insulin (H)       * blood glucose monitoring test strip    ONETOUCH ULTRA    200 strip    Test your blood sugar 3-4 times per day.    Type 2 diabetes mellitus with hyperglycemia, with long-term current use of insulin (H)       carvedilol 25 MG tablet    COREG    180 tablet    Take 1 tablet (25 mg) by mouth 2 times daily (with meals)    Essential hypertension with goal blood pressure less than 140/90       DULoxetine 30 MG EC capsule    CYMBALTA    180 capsule    Take 1 capsule (30 mg) by mouth 2 times daily    Type 2 diabetes mellitus with diabetic nephropathy, with long-term current use of insulin (H), Diabetic polyneuropathy associated with diabetes mellitus due to underlying condition (H)       furosemide 80 MG tablet    LASIX    180 tablet    Take 1 tablet (80 mg) by mouth 2 times daily Patient taking 80mg in AM, 40mg in PM    Lymphedema of both lower extremities       hydrALAZINE 100 MG Tabs tablet    APRESOLINE    180 tablet    Take 0.5 tablets (50 mg) by mouth 2 times daily    Hypertension goal BP (blood pressure) < 140/90       insulin glargine 100 UNIT/ML injection    LANTUS    3 mL    Inject 20 Units Subcutaneous At Bedtime    Type 2 diabetes mellitus with diabetic neuropathy, with long-term current use of insulin (H)       insulin pen needle 31G X 6 MM    ULTICARE MINI    100 each    Use daily or as directed.    Type 2 diabetes, HbA1c goal < 7% (H)       lactobacillus rhamnosus (GG) capsule     60 capsule    Take 1 capsule by mouth 2 times daily    Cellulitis of right leg       lisinopril 40 MG tablet     PRINIVIL/ZESTRIL    60 tablet    Take 1 tablet (40 mg) by mouth daily    Hypertension goal BP (blood pressure) < 140/90       miconazole 2 % powder    MICATIN; MICRO GUARD    71 g    Apply topically 2 times daily    Fungal dermatitis       MULTIVITAMIN PO      1 tablet by mouth daily        NovoLOG FLEXPEN 100 UNIT/ML injection   Generic drug:  insulin aspart     15 mL    Inject 4 units SQ with breakfast, lunch and dinner.    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    Edema of lower extremity       spironolactone 25 MG tablet    ALDACTONE    60 tablet    Take 1 tablet (25 mg) by mouth daily    Hypertension goal BP (blood pressure) < 140/90       tolterodine 2 MG 24 hr capsule    DETROL LA    60 capsule    TAKE 1 CAPSULE (2 MG) BY MOUTH DAILY    Urge incontinence of urine       Urea 20 % Crea cream     85 g    Apply topically daily    Xerosis cutis, Tyloma       * Notice:  This list has 6 medication(s) that are the same as other medications prescribed for you. Read the directions carefully, and ask your doctor or other care provider to review them with  you.

## 2018-01-09 NOTE — NURSING NOTE
"Chief Complaint   Patient presents with     Diabetes     Memory Loss       Initial BP (!) 83/40  Pulse 62  SpO2 97% Estimated body mass index is 33.01 kg/(m^2) as calculated from the following:    Height as of 12/13/17: 5' 5.75\" (1.67 m).    Weight as of 12/13/17: 203 lb (92.1 kg).  Medication Reconciliation: complete     Celestina Wei CMA    "

## 2018-01-10 ENCOUNTER — CARE COORDINATION (OUTPATIENT)
Dept: NEPHROLOGY | Facility: CLINIC | Age: 68
End: 2018-01-10

## 2018-01-10 DIAGNOSIS — I89.0 LYMPHEDEMA OF BOTH LOWER EXTREMITIES: ICD-10-CM

## 2018-01-10 LAB — TSH SERPL DL<=0.005 MIU/L-ACNC: 2.9 MU/L (ref 0.4–4)

## 2018-01-10 NOTE — PROGRESS NOTES
Reviewed with Dr. Basilio.   She recommends that patient decrease furosemide to 40 mg BID (currently taking 80/40) in addition to decreasing amlodipine as recommended by PCP.    Left VM for daughter to return call to discuss recommendations.    Patient will follow up with me in 2 weeks for labs and a nurse visit. I asked that patient bring her home cuff to that appointment so we can compare readings.    Pravin Cordova RN

## 2018-01-10 NOTE — PROGRESS NOTES
Left VM for patient's daughter, ginny, to return call to discuss patient's blood pressures.    Apparently patient's BP was 83/40 at her PCP appointment yesterday. This is quite low and much different than the home readings she was getting a couple weeks ago when I called her (see note from 12/27). PCP had patient decrease amlodipine from 10 mg daily to 5 mg daily.    Will wait for callback from daughter to discuss and then will update Dr. Basilio.     Pravin Cordova RN

## 2018-01-11 DIAGNOSIS — N18.5 CKD (CHRONIC KIDNEY DISEASE) STAGE 5, GFR LESS THAN 15 ML/MIN (H): Primary | ICD-10-CM

## 2018-01-11 RX ORDER — FUROSEMIDE 80 MG
40 TABLET ORAL 2 TIMES DAILY
Qty: 90 TABLET | Refills: 3 | COMMUNITY
Start: 2018-01-11 | End: 2018-02-21

## 2018-01-11 NOTE — PROGRESS NOTES
Called patient and confirmed she got my message about decreasing furosemide dose to 40 mg BID.     She said her BP today was 139/66. She'll follow up with me for nurse visit and labs in 2 weeks. I also offered a referral for [facility] pharmacy services review meds. Patient said she'll leave that up to her daughter, but will let me know what they decide.     Pravin Cordova RN

## 2018-01-12 ENCOUNTER — TELEPHONE (OUTPATIENT)
Dept: PHARMACY | Facility: OTHER | Age: 68
End: 2018-01-12

## 2018-01-12 NOTE — TELEPHONE ENCOUNTER
MTM referral from: [place] clinic visit (referral by provider)    MTM referral outreach attempt #1 on January 12, 2018 at 2:30 PM      Outcome: Left Message on cell phone! Call daughter to make appts (its the cell phone number)    Mariana Hodge, MTM Coordinator Intern

## 2018-01-15 NOTE — TELEPHONE ENCOUNTER
MTM referral from: [facility] visit (referral by provider)    MTM referral outreach attempt #2 on January 15, 2018 at 12:30 PM      Outcome: Patient not reachable after several attempts, will route to MTM Pharmacist/Provider as an FYI. Thank you for the referral.    Kitty Cole, MTM Coordinator

## 2018-01-22 DIAGNOSIS — N18.4 CHRONIC KIDNEY DISEASE, STAGE 4 (SEVERE) (H): Primary | Chronic | ICD-10-CM

## 2018-01-24 ENCOUNTER — ALLIED HEALTH/NURSE VISIT (OUTPATIENT)
Dept: TRANSPLANT | Facility: CLINIC | Age: 68
DRG: 640 | End: 2018-01-24
Attending: INTERNAL MEDICINE
Payer: MEDICARE

## 2018-01-24 ENCOUNTER — HOSPITAL ENCOUNTER (INPATIENT)
Facility: CLINIC | Age: 68
LOS: 2 days | Discharge: HOME | DRG: 640 | End: 2018-01-26
Attending: EMERGENCY MEDICINE | Admitting: INTERNAL MEDICINE
Payer: MEDICARE

## 2018-01-24 VITALS — SYSTOLIC BLOOD PRESSURE: 95 MMHG | HEART RATE: 66 BPM | DIASTOLIC BLOOD PRESSURE: 60 MMHG | OXYGEN SATURATION: 97 %

## 2018-01-24 DIAGNOSIS — E87.5 HYPERKALEMIA: ICD-10-CM

## 2018-01-24 DIAGNOSIS — Z99.2 ESRD (END STAGE RENAL DISEASE) ON DIALYSIS (H): ICD-10-CM

## 2018-01-24 DIAGNOSIS — N18.6 ESRD (END STAGE RENAL DISEASE) ON DIALYSIS (H): ICD-10-CM

## 2018-01-24 DIAGNOSIS — N18.4 CHRONIC KIDNEY DISEASE, STAGE 4 (SEVERE) (H): Chronic | ICD-10-CM

## 2018-01-24 DIAGNOSIS — E55.9 VITAMIN D DEFICIENCY: Primary | ICD-10-CM

## 2018-01-24 LAB
ALBUMIN SERPL-MCNC: 2.6 G/DL (ref 3.4–5)
ANION GAP SERPL CALCULATED.3IONS-SCNC: 6 MMOL/L (ref 3–14)
ANION GAP SERPL CALCULATED.3IONS-SCNC: 7 MMOL/L (ref 3–14)
BUN SERPL-MCNC: 68 MG/DL (ref 7–30)
BUN SERPL-MCNC: 68 MG/DL (ref 7–30)
CALCIUM SERPL-MCNC: 9 MG/DL (ref 8.5–10.1)
CALCIUM SERPL-MCNC: 9.2 MG/DL (ref 8.5–10.1)
CHLORIDE SERPL-SCNC: 112 MMOL/L (ref 94–109)
CHLORIDE SERPL-SCNC: 116 MMOL/L (ref 94–109)
CO2 SERPL-SCNC: 20 MMOL/L (ref 20–32)
CO2 SERPL-SCNC: 22 MMOL/L (ref 20–32)
CREAT SERPL-MCNC: 4.38 MG/DL (ref 0.52–1.04)
CREAT SERPL-MCNC: 4.53 MG/DL (ref 0.52–1.04)
GFR SERPL CREATININE-BSD FRML MDRD: 10 ML/MIN/1.7M2
GFR SERPL CREATININE-BSD FRML MDRD: 10 ML/MIN/1.7M2
GLUCOSE BLDC GLUCOMTR-MCNC: 119 MG/DL (ref 70–99)
GLUCOSE BLDC GLUCOMTR-MCNC: 133 MG/DL (ref 70–99)
GLUCOSE BLDC GLUCOMTR-MCNC: 149 MG/DL (ref 70–99)
GLUCOSE BLDC GLUCOMTR-MCNC: 241 MG/DL (ref 70–99)
GLUCOSE BLDC GLUCOMTR-MCNC: 61 MG/DL (ref 70–99)
GLUCOSE SERPL-MCNC: 74 MG/DL (ref 70–99)
GLUCOSE SERPL-MCNC: 84 MG/DL (ref 70–99)
HGB BLD-MCNC: 9 G/DL (ref 11.7–15.7)
INTERPRETATION ECG - MUSE: NORMAL
MAGNESIUM SERPL-MCNC: 2.4 MG/DL (ref 1.6–2.3)
PHOSPHATE SERPL-MCNC: 4.7 MG/DL (ref 2.5–4.5)
PHOSPHATE SERPL-MCNC: 4.9 MG/DL (ref 2.5–4.5)
PLATELET # BLD AUTO: 228 10E9/L (ref 150–450)
POTASSIUM SERPL-SCNC: 5.4 MMOL/L (ref 3.4–5.3)
POTASSIUM SERPL-SCNC: 5.8 MMOL/L (ref 3.4–5.3)
POTASSIUM SERPL-SCNC: 6 MMOL/L (ref 3.4–5.3)
POTASSIUM SERPL-SCNC: 6.4 MMOL/L (ref 3.4–5.3)
POTASSIUM SERPL-SCNC: 6.8 MMOL/L (ref 3.4–5.3)
SODIUM SERPL-SCNC: 141 MMOL/L (ref 133–144)
SODIUM SERPL-SCNC: 142 MMOL/L (ref 133–144)

## 2018-01-24 PROCEDURE — 96365 THER/PROPH/DIAG IV INF INIT: CPT

## 2018-01-24 PROCEDURE — 99291 CRITICAL CARE FIRST HOUR: CPT | Mod: 25 | Performed by: EMERGENCY MEDICINE

## 2018-01-24 PROCEDURE — 84100 ASSAY OF PHOSPHORUS: CPT | Performed by: EMERGENCY MEDICINE

## 2018-01-24 PROCEDURE — 99223 1ST HOSP IP/OBS HIGH 75: CPT | Mod: AI | Performed by: INTERNAL MEDICINE

## 2018-01-24 PROCEDURE — A9270 NON-COVERED ITEM OR SERVICE: HCPCS | Mod: GY | Performed by: EMERGENCY MEDICINE

## 2018-01-24 PROCEDURE — 25000128 H RX IP 250 OP 636: Performed by: EMERGENCY MEDICINE

## 2018-01-24 PROCEDURE — 25000132 ZZH RX MED GY IP 250 OP 250 PS 637: Mod: GY | Performed by: STUDENT IN AN ORGANIZED HEALTH CARE EDUCATION/TRAINING PROGRAM

## 2018-01-24 PROCEDURE — 85018 HEMOGLOBIN: CPT | Performed by: INTERNAL MEDICINE

## 2018-01-24 PROCEDURE — 25800025 ZZH RX 258: Performed by: EMERGENCY MEDICINE

## 2018-01-24 PROCEDURE — 36415 COLL VENOUS BLD VENIPUNCTURE: CPT | Performed by: INTERNAL MEDICINE

## 2018-01-24 PROCEDURE — 36415 COLL VENOUS BLD VENIPUNCTURE: CPT | Performed by: STUDENT IN AN ORGANIZED HEALTH CARE EDUCATION/TRAINING PROGRAM

## 2018-01-24 PROCEDURE — 93005 ELECTROCARDIOGRAM TRACING: CPT

## 2018-01-24 PROCEDURE — 96361 HYDRATE IV INFUSION ADD-ON: CPT

## 2018-01-24 PROCEDURE — A9270 NON-COVERED ITEM OR SERVICE: HCPCS | Mod: GY | Performed by: STUDENT IN AN ORGANIZED HEALTH CARE EDUCATION/TRAINING PROGRAM

## 2018-01-24 PROCEDURE — 83735 ASSAY OF MAGNESIUM: CPT | Performed by: EMERGENCY MEDICINE

## 2018-01-24 PROCEDURE — 25000128 H RX IP 250 OP 636: Performed by: STUDENT IN AN ORGANIZED HEALTH CARE EDUCATION/TRAINING PROGRAM

## 2018-01-24 PROCEDURE — 85049 AUTOMATED PLATELET COUNT: CPT | Performed by: STUDENT IN AN ORGANIZED HEALTH CARE EDUCATION/TRAINING PROGRAM

## 2018-01-24 PROCEDURE — 96375 TX/PRO/DX INJ NEW DRUG ADDON: CPT

## 2018-01-24 PROCEDURE — 84132 ASSAY OF SERUM POTASSIUM: CPT | Performed by: STUDENT IN AN ORGANIZED HEALTH CARE EDUCATION/TRAINING PROGRAM

## 2018-01-24 PROCEDURE — 80069 RENAL FUNCTION PANEL: CPT | Performed by: INTERNAL MEDICINE

## 2018-01-24 PROCEDURE — 25000131 ZZH RX MED GY IP 250 OP 636 PS 637: Mod: GY | Performed by: STUDENT IN AN ORGANIZED HEALTH CARE EDUCATION/TRAINING PROGRAM

## 2018-01-24 PROCEDURE — 93010 ELECTROCARDIOGRAM REPORT: CPT | Mod: Z6 | Performed by: EMERGENCY MEDICINE

## 2018-01-24 PROCEDURE — 84132 ASSAY OF SERUM POTASSIUM: CPT | Performed by: EMERGENCY MEDICINE

## 2018-01-24 PROCEDURE — 80048 BASIC METABOLIC PNL TOTAL CA: CPT | Performed by: EMERGENCY MEDICINE

## 2018-01-24 PROCEDURE — 12000008 ZZH R&B INTERMEDIATE UMMC

## 2018-01-24 PROCEDURE — 99285 EMERGENCY DEPT VISIT HI MDM: CPT | Mod: 25

## 2018-01-24 PROCEDURE — 93010 ELECTROCARDIOGRAM REPORT: CPT | Performed by: INTERNAL MEDICINE

## 2018-01-24 PROCEDURE — 25000132 ZZH RX MED GY IP 250 OP 250 PS 637: Mod: GY | Performed by: EMERGENCY MEDICINE

## 2018-01-24 PROCEDURE — 00000146 ZZHCL STATISTIC GLUCOSE BY METER IP

## 2018-01-24 RX ORDER — SODIUM CHLORIDE 9 MG/ML
INJECTION, SOLUTION INTRAVENOUS CONTINUOUS
Status: ACTIVE | OUTPATIENT
Start: 2018-01-24 | End: 2018-01-25

## 2018-01-24 RX ORDER — NALOXONE HYDROCHLORIDE 0.4 MG/ML
.1-.4 INJECTION, SOLUTION INTRAMUSCULAR; INTRAVENOUS; SUBCUTANEOUS
Status: DISCONTINUED | OUTPATIENT
Start: 2018-01-24 | End: 2018-01-26 | Stop reason: HOSPADM

## 2018-01-24 RX ORDER — CARVEDILOL 25 MG/1
25 TABLET ORAL 2 TIMES DAILY WITH MEALS
Status: DISCONTINUED | OUTPATIENT
Start: 2018-01-25 | End: 2018-01-26 | Stop reason: HOSPADM

## 2018-01-24 RX ORDER — LIDOCAINE 40 MG/G
CREAM TOPICAL
Status: DISCONTINUED | OUTPATIENT
Start: 2018-01-24 | End: 2018-01-26 | Stop reason: HOSPADM

## 2018-01-24 RX ORDER — NYSTATIN 100000 U/G
CREAM TOPICAL 2 TIMES DAILY
Status: DISCONTINUED | OUTPATIENT
Start: 2018-01-24 | End: 2018-01-26 | Stop reason: HOSPADM

## 2018-01-24 RX ORDER — CALCITRIOL 0.25 UG/1
0.25 CAPSULE, LIQUID FILLED ORAL DAILY
Qty: 30 CAPSULE | Refills: 3 | Status: SHIPPED | OUTPATIENT
Start: 2018-01-24 | End: 2018-01-24

## 2018-01-24 RX ORDER — ACETAMINOPHEN 325 MG/1
650 TABLET ORAL 4 TIMES DAILY
Status: DISCONTINUED | OUTPATIENT
Start: 2018-01-24 | End: 2018-01-26 | Stop reason: HOSPADM

## 2018-01-24 RX ORDER — UREA 200 MG/G
CREAM TOPICAL DAILY
Status: DISCONTINUED | OUTPATIENT
Start: 2018-01-24 | End: 2018-01-26 | Stop reason: HOSPADM

## 2018-01-24 RX ORDER — AMLODIPINE BESYLATE 5 MG/1
5 TABLET ORAL DAILY
Status: DISCONTINUED | OUTPATIENT
Start: 2018-01-25 | End: 2018-01-26 | Stop reason: HOSPADM

## 2018-01-24 RX ORDER — ATORVASTATIN CALCIUM 20 MG/1
20 TABLET, FILM COATED ORAL DAILY
Status: DISCONTINUED | OUTPATIENT
Start: 2018-01-25 | End: 2018-01-26 | Stop reason: HOSPADM

## 2018-01-24 RX ORDER — HEPARIN SODIUM 5000 [USP'U]/.5ML
5000 INJECTION, SOLUTION INTRAVENOUS; SUBCUTANEOUS EVERY 12 HOURS
Status: DISCONTINUED | OUTPATIENT
Start: 2018-01-24 | End: 2018-01-26 | Stop reason: HOSPADM

## 2018-01-24 RX ORDER — ALBUTEROL SULFATE 90 UG/1
2 AEROSOL, METERED RESPIRATORY (INHALATION) EVERY 6 HOURS PRN
Status: DISCONTINUED | OUTPATIENT
Start: 2018-01-24 | End: 2018-01-26 | Stop reason: HOSPADM

## 2018-01-24 RX ORDER — CALCITRIOL 0.25 UG/1
0.25 CAPSULE, LIQUID FILLED ORAL DAILY
Status: DISCONTINUED | OUTPATIENT
Start: 2018-01-24 | End: 2018-01-26 | Stop reason: HOSPADM

## 2018-01-24 RX ORDER — DEXTROSE MONOHYDRATE 25 G/50ML
25 INJECTION, SOLUTION INTRAVENOUS ONCE
Status: COMPLETED | OUTPATIENT
Start: 2018-01-24 | End: 2018-01-24

## 2018-01-24 RX ORDER — DEXTROSE MONOHYDRATE 25 G/50ML
25-50 INJECTION, SOLUTION INTRAVENOUS
Status: DISCONTINUED | OUTPATIENT
Start: 2018-01-24 | End: 2018-01-26 | Stop reason: HOSPADM

## 2018-01-24 RX ORDER — NICOTINE POLACRILEX 4 MG
15-30 LOZENGE BUCCAL
Status: DISCONTINUED | OUTPATIENT
Start: 2018-01-24 | End: 2018-01-26 | Stop reason: HOSPADM

## 2018-01-24 RX ORDER — SODIUM POLYSTYRENE SULFONATE 15 G/60ML
30 SUSPENSION ORAL; RECTAL ONCE
Status: COMPLETED | OUTPATIENT
Start: 2018-01-24 | End: 2018-01-24

## 2018-01-24 RX ORDER — DULOXETIN HYDROCHLORIDE 30 MG/1
30 CAPSULE, DELAYED RELEASE ORAL 2 TIMES DAILY
Status: DISCONTINUED | OUTPATIENT
Start: 2018-01-24 | End: 2018-01-26 | Stop reason: HOSPADM

## 2018-01-24 RX ORDER — FUROSEMIDE 40 MG
40 TABLET ORAL 2 TIMES DAILY
Status: DISCONTINUED | OUTPATIENT
Start: 2018-01-24 | End: 2018-01-26 | Stop reason: HOSPADM

## 2018-01-24 RX ORDER — HYDRALAZINE HYDROCHLORIDE 50 MG/1
50 TABLET, FILM COATED ORAL 2 TIMES DAILY
Status: DISCONTINUED | OUTPATIENT
Start: 2018-01-25 | End: 2018-01-26 | Stop reason: HOSPADM

## 2018-01-24 RX ORDER — DEXTROSE MONOHYDRATE 100 MG/ML
INJECTION, SOLUTION INTRAVENOUS CONTINUOUS
Status: DISPENSED | OUTPATIENT
Start: 2018-01-24 | End: 2018-01-24

## 2018-01-24 RX ADMIN — HUMAN INSULIN 9 UNITS: 100 INJECTION, SOLUTION SUBCUTANEOUS at 14:20

## 2018-01-24 RX ADMIN — MENTHOL, CAMPHOR: 1.11; 1.11 LOTION TOPICAL at 20:26

## 2018-01-24 RX ADMIN — SODIUM BICARBONATE 50 MEQ: 84 INJECTION INTRAVENOUS at 14:20

## 2018-01-24 RX ADMIN — DULOXETINE HYDROCHLORIDE 30 MG: 30 CAPSULE, DELAYED RELEASE ORAL at 20:24

## 2018-01-24 RX ADMIN — DEXTROSE MONOHYDRATE: 100 INJECTION, SOLUTION INTRAVENOUS at 16:16

## 2018-01-24 RX ADMIN — NYSTATIN: 100000 CREAM TOPICAL at 20:26

## 2018-01-24 RX ADMIN — CALCIUM GLUCONATE 2 G: 94 INJECTION, SOLUTION INTRAVENOUS at 14:20

## 2018-01-24 RX ADMIN — SODIUM POLYSTYRENE SULFONATE 30 G: 15 SUSPENSION ORAL; RECTAL at 14:00

## 2018-01-24 RX ADMIN — CALCITRIOL 0.25 MCG: 0.25 CAPSULE, LIQUID FILLED ORAL at 20:25

## 2018-01-24 RX ADMIN — DEXTROSE MONOHYDRATE 25 G: 25 INJECTION, SOLUTION INTRAVENOUS at 14:20

## 2018-01-24 RX ADMIN — SODIUM CHLORIDE: 9 INJECTION, SOLUTION INTRAVENOUS at 18:19

## 2018-01-24 RX ADMIN — FUROSEMIDE 40 MG: 40 TABLET ORAL at 20:24

## 2018-01-24 RX ADMIN — INSULIN GLARGINE 10 UNITS: 100 INJECTION, SOLUTION SUBCUTANEOUS at 22:44

## 2018-01-24 RX ADMIN — INSULIN ASPART 4 UNITS: 100 INJECTION, SOLUTION INTRAVENOUS; SUBCUTANEOUS at 20:24

## 2018-01-24 ASSESSMENT — ENCOUNTER SYMPTOMS
DIARRHEA: 0
ARTHRALGIAS: 0
NECK STIFFNESS: 0
DIZZINESS: 1
COLOR CHANGE: 0
CONFUSION: 0
DIFFICULTY URINATING: 0
EYE REDNESS: 0
PALPITATIONS: 0
SHORTNESS OF BREATH: 0
FEVER: 0
UNEXPECTED WEIGHT CHANGE: 1
ABDOMINAL PAIN: 0
HEADACHES: 0
NAUSEA: 0
VOMITING: 0

## 2018-01-24 NOTE — PHARMACY-ADMISSION MEDICATION HISTORY
Admission medication history interview status for the 1/24/2018 admission is complete. See Epic admission navigator for allergy information, pharmacy, prior to admission medications and immunization status.     Medication history interview sources:  Self/Daughter (Caroline)    Changes made to PTA medication list (reason)  Added: None  Deleted:   1. Calcitriol 0.25 mcg daily  Changed:   1. Updated dosing for furosemide     Additional medication history information (including reliability of information, actions taken by pharmacist): Patient  Is a good historian of her medications.   Patient did not have her flu shot this year.   Last doses of insulin:-  -20 units insulin glargine last night on  01/23/18  -4 units Novolog flexepen 100 units/ml this morning on 01/24/18      Prior to Admission medications    Medication Sig Last Dose Taking? Auth Provider   furosemide (LASIX) 80 MG tablet Take 0.5 tablets (40 mg) by mouth 2 times daily 1/24/2018 at AM Yes Kathryn Basilio MD   amLODIPine (NORVASC) 5 MG tablet Take 1 tablet (5 mg) by mouth daily Past Month at AM Yes Noam Jackson MD   spironolactone (ALDACTONE) 25 MG tablet Take 1 tablet (25 mg) by mouth daily 1/24/2018 at AM Yes De Albert MD   Urea 20 % CREA cream Apply topically daily Past Week at PM Yes Eleazar Pack DPM   NOVOLOG FLEXPEN 100 UNIT/ML soln Inject 4 units SQ with breakfast, lunch and dinner. 1/24/2018 at AM Yes Arabella Kamara PA-C   hydrALAZINE (APRESOLINE) 100 MG TABS tablet Take 0.5 tablets (50 mg) by mouth 2 times daily 1/24/2018 at AM Yes Kathryn Basilio MD   blood glucose monitoring (ONETOUCH ULTRA) test strip Test your blood sugar 3-4 times per day. Past Week at Unknown time Yes Arabella Kamara PA-C   acetaminophen (TYLENOL) 325 MG tablet Take 2 tablets (650 mg) by mouth 4 times daily  Patient taking differently: Take 650 mg by mouth 4 times daily as needed for mild pain  Past Week at  Unknown time Yes Rigo Dickens MD   insulin glargine (LANTUS) 100 UNIT/ML injection Inject 20 Units Subcutaneous At Bedtime 1/24/2018 at PM Yes Rigo Dickens MD   lactobacillus rhamnosus, GG, (CULTURELL) capsule Take 1 capsule by mouth 2 times daily 1/23/2018 at Unknown time Yes Rigo Dickens MD   lisinopril (PRINIVIL/ZESTRIL) 40 MG tablet Take 1 tablet (40 mg) by mouth daily 1/24/2018 at AM Yes Rigo Dickens MD   miconazole (MICATIN; MICRO GUARD) 2 % powder Apply topically 2 times daily Past Week at Unknown time Yes Rigo Dickens MD   order for DME 1 wheelchair Past Week at Unknown time Yes Noam [facility] MD   tolterodine (DETROL LA) 2 MG 24 hr capsule TAKE 1 CAPSULE (2 MG) BY MOUTH DAILY 1/24/2018 at PM Yes Noam Jackson MD   DULoxetine (CYMBALTA) 30 MG EC capsule Take 1 capsule (30 mg) by mouth 2 times daily 1/24/2018 at AM Yes Noam Jackson MD   blood glucose monitoring (ONE TOUCH ULTRA) test strip Use to test blood sugars 2 times daily or as directed. Past Week at Unknown time Yes Noam Jackson MD   atorvastatin (LIPITOR) 20 MG tablet Take 1 tablet (20 mg) by mouth daily 1/24/2018 at PM Yes Noam Jackson MD   carvedilol (COREG) 25 MG tablet Take 1 tablet (25 mg) by mouth 2 times daily (with meals) 1/24/2018 at AM Yes Noam Jackson MD   albuterol (PROAIR HFA, PROVENTIL HFA, VENTOLIN HFA) 108 (90 BASE) MCG/ACT inhaler Inhale 2 puffs into the lungs every 6 hours as needed for shortness of breath / dyspnea or wheezing 1/23/2018 at Unknown time Yes Noam Jackson MD   insulin pen needle (ULTICARE MINI) 31G X 6 MM Use daily or as directed. 1/23/2018 at Unknown time Yes Noam Jackson MD   MULTIVITAMIN OR 1 tablet by mouth daily 1/23/2018 at PM Yes Reported, Patient         Medication history completed by: Sourav Colon, PD4

## 2018-01-24 NOTE — LETTER
Transition Communication Hand-off for Care Transitions to Next Level of Care Provider    Name: Supriya Herr  MRN #: [id number]  Primary Care Provider: Noam Jackson     Primary Clinic: [address] [place] [id number]     Reason for Hospitalization:  Hyperkalemia [E87.5]  ESRD (end stage renal disease) on dialysis (H) [N18.6, Z99.2]    Admit Date/Time: 1/24/2018 12:33 PM  Discharge Date: 1/26/    Payor Source: Payor: MEDICARE / Plan: MEDICARE / Product Type: Medicare /     Discharge Needs Assessment:  Needs       Most Recent Value    Anticipated Changes Related to Illness none    Equipment Currently Used at Home wheelchair, manual        Follow-up plan:  Future Appointments  Date Time Provider Department Center   2/6/2018 11:00 AM Zari Thakkar LP [facility] [place]   2/6/2018 12:00 PM Brenden Blackwell, [facility]   2/16/2018 3:00 PM Zari Thakkar LP [facility] [place]   2/21/2018 10:45 AM [facility]   2/21/2018 11:35 AM Kathryn Basilio MD [facility]   3/2/2018 11:30 AM Arabella Kamara PA-C [facility]   3/2/2018 12:40 PM Eleazar Pack DPM [facility]      Key Recommendations:  Please review AVS    Hellen CELESTIN RN PHN  Patient Care Management Coordinator  [place] [address] [facility] [address]  Phone: [phone] / Pager: [phone]      AVS/Discharge Summary is the source of truth; this is a helpful guide for improved communication of patient story

## 2018-01-24 NOTE — IP AVS SNAPSHOT
MRN:[id number]                      After Visit Summary   1/24/2018    Supriya Herr    MRN: [id number]           Thank you!     Thank you for choosing [facility] for your care. Our goal is always to provide you with excellent care. Hearing back from our patients is one way we can continue to improve our services. Please take a few minutes to complete the written survey that you may receive in the mail after you visit with us. Thank you!        Patient Information     Date Of Birth          1950        Designated Caregiver       Most Recent Value    Caregiver    Will someone help with your care after discharge? yes    Name of designated caregiver Caroline    Phone number of caregiver see chart    Caregiver address see chart      About your hospital stay     You were admitted on:  January 24, 2018 You last received care in the:  [address] [facility]    You were discharged on:  January 26, 2018        Reason for your hospital stay       You were admitted to the hospital due to a high potassium level in your blood that was treated with fluids and medications to get rid of excess potassium in your body. We will be stopping medications that we think caused the high potassium levels.                  Who to Call     For medical emergencies, please call 911.  For non-urgent questions about your medical care, please call your primary care provider or clinic, [phone]          Attending Provider     Provider Specialty    Santi Sandoval MD Emergency Medicine    Faina Godfrey MD Emergency Medicine    , MD [facility]       Primary Care Provider Office Phone # Fax #    Noam Jackson [phone]       When to contact your care team       Fevers, chills, difficulty breathing, chest pain or pressure.                  After Care Instructions     Activity       Your activity upon discharge: activity as tolerated            Diet       Follow this diet  upon discharge: Orders Placed This Encounter      Combination Diet Renal Diet            Discharge Instructions       You should stop taking the lisinopril and spironolactone as we feel these medications were contributing to your high potassium level. You can continue the lasix, amlodipine, and carvedilol at the same doses you were prior to being in the hospital. We would also like you to stop taking hydralazine. Your blood pressure might increase with these changes, adjustments can be made outside of the hospital in clinic. You should follow up within a week with either your primary care provider or kidney doctor to recheck your potassium level and blood pressure.                  Follow-up Appointments     Adult [facility] Follow-up and recommended labs and tests       Follow up with kidney doctor or PCP within 1 week for blood pressure and potassium check. Would recommend BMP early next week.    Appointments on [place] and/or [facility] (with [facility] or [facility] provider or service). Call [phone] if you haven't heard regarding these appointments within 7 days of discharge.                  Your next 10 appointments already scheduled     Feb 06, 2018 11:00 AM CST   New Visit with Zari Thakkar LP   [facility] ([place] [address] [id number] [phone]            Feb 06, 2018 12:00 PM CST   (Arrive by 11:45 AM)   [facility] ([facility])    [address]  [address] [facility] [address] [phone]           Bring a current list of meds and any records pertaining to this visit. For Physicals, please bring immunization records and any forms needing to be filled out. Please arrive 10 minutes early to complete paperwork.            Feb 16, 2018  3:00 PM CST   Return Visit with Zari Thakkar LP   [facility]  "Services [facility] ([facility])    [facility]  [address]  [facility] [address]   [phone]            Feb 21, 2018 10:45 AM CST   Lab with [facility]   [facility] ([facility])    [address]  1st [address]  [place] [facility] [address]   [phone]            Feb 21, 2018 11:35 AM CST   (Arrive by 11:05 AM)   Return Visit with Kathryn Basilio MD   [facility] ([facility])    [address]  [address]  [place] [facility] [address]   [phone]            Mar 02, 2018 11:30 AM CST   (Arrive by 11:15 AM)   RETURN DIABETES with Arabella Kamara PA-C   [facility] ([facility])    [address]  3rd [address]  [place] [address]   [phone]            Mar 02, 2018 12:40 PM CST   (Arrive by 12:25 PM)   RETURN FOOT/ANKLE with Eleazar Pack DPM   [facility] ([facility])    [address]  4th [address]  [place] [facility] [address]   [phone]              Pending Results     No orders found from 1/22/2018 to 1/25/2018.            Statement of Approval     Ordered          01/26/18 1252  I have reviewed and agree with all the recommendations and orders detailed in this document.  EFFECTIVE NOW     Approved and electronically signed by:  Nicola Thorne MD             Admission Information     Date & Time Provider Department Dept. Phone    1/24/2018 Kirill Black MD [address] [facility] [phone]      Your Vitals Were     Blood Pressure Pulse Temperature Respirations Height Weight    118/28 (BP Location: Right arm) 70 97.1  F (36.2  C) (Oral) 16 1.676 m (5' 6\") 90.7 kg (199 lb 15.3 oz)    Pulse Oximetry BMI (Body Mass Index)                97% 32.27 kg/m2          MyChart Information     MyChart lets you send messages to your doctor, view your test results, renew your prescriptions, schedule " "appointments and more. To sign up, go to [email or web address] . Click on \"Log in\" on the left side of the screen, which will take you to the Welcome page. Then click on \"Sign up Now\" on the right side of the page.     You will be asked to enter the access code listed below, as well as some personal information. Please follow the directions to create your username and password.     Your access code is: [email or web address]  Expires: 3/13/2018  4:12 PM     Your access code will  in 90 days. If you need help or a new code, please call your [facility] clinic or [phone].        Care EveryWhere ID     This is your Care EveryWhere ID. This could be used by other organizations to access your [facility] medical records  [id number]        Equal Access to Services     KALYANI WARREN : Emeterio Lam, kristin crocker, karina parisi, madeline chaudhari. So [facility] [phone].    ATENCIÓN: Si habla español, tiene a santoro disposición servicios gratuitos de asistencia lingüística. Renuka al [phone].    We comply with applicable federal civil rights laws and [place] laws. We do not discriminate on the basis of race, color, national origin, age, disability, sex, sexual orientation, or gender identity.               Review of your medicines      CONTINUE these medicines which may have CHANGED, or have new prescriptions. If we are uncertain of the size of tablets/capsules you have at home, strength may be listed as something that might have changed.        Dose / Directions    acetaminophen 325 MG tablet   Commonly known as:  TYLENOL   This may have changed:    - when to take this  - reasons to take this   Used for:  Cellulitis of right leg        Dose:  650 mg   Take 2 tablets (650 mg) by mouth 4 times daily   Quantity:  100 tablet   Refills:  0         CONTINUE these medicines which have NOT CHANGED        Dose / Directions    albuterol 108 (90 BASE) MCG/ACT Inhaler   Commonly " known as:  PROAIR HFA/PROVENTIL HFA/VENTOLIN HFA   Used for:  Cough        Dose:  2 puff   Inhale 2 puffs into the lungs every 6 hours as needed for shortness of breath / dyspnea or wheezing   Quantity:  3 Inhaler   Refills:  1       amLODIPine 5 MG tablet   Commonly known as:  NORVASC   Used for:  Type 2 diabetes mellitus with stage 3 chronic kidney disease, without long-term current use of insulin (H), Other fatigue        Dose:  5 mg   Take 1 tablet (5 mg) by mouth daily   Quantity:  30 tablet   Refills:  1       ASPIRIN NOT PRESCRIBED   Commonly known as:  INTENTIONAL   Used for:  Anemia due to blood loss, acute        Dose:  1 each   1 each continuous prn Antiplatelet medication not prescribed intentionally due to current nosebleeds   Quantity:  0 each   Refills:  0       atorvastatin 20 MG tablet   Commonly known as:  LIPITOR   Used for:  Hyperlipidemia LDL goal <100        Dose:  20 mg   Take 1 tablet (20 mg) by mouth daily   Quantity:  90 tablet   Refills:  3       * blood glucose monitoring test strip   Commonly known as:  ONETOUCH ULTRA   Used for:  Type 2 diabetes mellitus with stage 3 chronic kidney disease, without long-term current use of insulin (H)        Use to test blood sugars 2 times daily or as directed.   Quantity:  200 strip   Refills:  3       * blood glucose monitoring test strip   Commonly known as:  ONETOUCH ULTRA   Used for:  Type 2 diabetes mellitus with hyperglycemia, with long-term current use of insulin (H)        Test your blood sugar 3-4 times per day.   Quantity:  200 strip   Refills:  3       carvedilol 25 MG tablet   Commonly known as:  COREG   Used for:  Essential hypertension with goal blood pressure less than 140/90        Dose:  25 mg   Take 1 tablet (25 mg) by mouth 2 times daily (with meals)   Quantity:  180 tablet   Refills:  3       DULoxetine 30 MG EC capsule   Commonly known as:  CYMBALTA   Used for:  Type 2 diabetes mellitus with diabetic nephropathy, with long-term  current use of insulin (H), Diabetic polyneuropathy associated with diabetes mellitus due to underlying condition (H)        Dose:  30 mg   Take 1 capsule (30 mg) by mouth 2 times daily   Quantity:  180 capsule   Refills:  3       furosemide 80 MG tablet   Commonly known as:  LASIX   Used for:  Lymphedema of both lower extremities        Dose:  40 mg   Take 40 mg by mouth 2 times daily   Quantity:  90 tablet   Refills:  3       insulin glargine 100 UNIT/ML injection   Commonly known as:  LANTUS   Used for:  Type 2 diabetes mellitus with diabetic neuropathy, with long-term current use of insulin (H)        Dose:  20 Units   Inject 20 Units Subcutaneous At Bedtime   Quantity:  3 mL   Refills:  1       insulin pen needle 31G X 6 MM   Commonly known as:  ULTICARE MINI   Used for:  Type 2 diabetes, HbA1c goal < 7% (H)        Use daily or as directed.   Quantity:  100 each   Refills:  prn       lactobacillus rhamnosus (GG) capsule   Used for:  Cellulitis of right leg        Dose:  1 capsule   Take 1 capsule by mouth 2 times daily   Quantity:  60 capsule   Refills:  0       miconazole 2 % powder   Commonly known as:  MICATIN; MICRO GUARD   Used for:  Fungal dermatitis        Apply topically 2 times daily   Quantity:  71 g   Refills:  0       MULTIVITAMIN PO        1 tablet by mouth daily   Refills:  0       NovoLOG FLEXPEN 100 UNIT/ML injection   Used for:  Type 2 diabetes mellitus with hyperglycemia, with long-term current use of insulin (H)   Generic drug:  insulin aspart        Inject 4 units SQ with breakfast, lunch and dinner.   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   Quantity:  1 Units   Refills:  0       tolterodine 2 MG 24 hr capsule   Commonly known as:  DETROL LA   Used for:  Urge incontinence of urine        TAKE 1 CAPSULE (2 MG) BY MOUTH DAILY   Quantity:  60 capsule   Refills:  3       Urea 20 % Crea cream   Used for:  Xerosis cutis, Tyloma        Apply topically daily   Quantity:  85 g   Refills:  3       * Notice:  This list has 6 medication(s) that are the same as other medications prescribed for you. Read the directions carefully, and ask your doctor or other care provider to review them with you.      STOP taking     hydrALAZINE 100 MG Tabs tablet   Commonly known as:  APRESOLINE           lisinopril 40 MG tablet   Commonly known as:  PRINIVIL/ZESTRIL           spironolactone 25 MG tablet   Commonly known as:  ALDACTONE                    Protect others around you: Learn how to safely use, store and throw away your medicines at www.disposemymeds.org.             Medication List: This is a list of all your medications and when to take them. Check marks below indicate your daily home schedule. Keep this list as a reference.      Medications           Morning Afternoon Evening Bedtime As Needed    acetaminophen 325 MG tablet   Commonly known as:  TYLENOL   Take 2 tablets (650 mg) by mouth 4 times daily                                albuterol 108 (90 BASE) MCG/ACT  Inhaler   Commonly known as:  PROAIR HFA/PROVENTIL HFA/VENTOLIN HFA   Inhale 2 puffs into the lungs every 6 hours as needed for shortness of breath / dyspnea or wheezing                                amLODIPine 5 MG tablet   Commonly known as:  NORVASC   Take 1 tablet (5 mg) by mouth daily   Last time this was given:  5 mg on 1/26/2018  8:48 AM                                ASPIRIN NOT PRESCRIBED   Commonly known as:  INTENTIONAL   1 each continuous prn Antiplatelet medication not prescribed intentionally due to current nosebleeds                                atorvastatin 20 MG tablet   Commonly known as:  LIPITOR   Take 1 tablet (20 mg) by mouth daily   Last time this was given:  20 mg on 1/26/2018  8:48 AM                                * blood glucose monitoring test strip   Commonly known as:  ONETOUCH ULTRA   Use to test blood sugars 2 times daily or as directed.                                * blood glucose monitoring test strip   Commonly known as:  ONETOUCH ULTRA   Test your blood sugar 3-4 times per day.                                carvedilol 25 MG tablet   Commonly known as:  COREG   Take 1 tablet (25 mg) by mouth 2 times daily (with meals)   Last time this was given:  25 mg on 1/26/2018  8:48 AM                                DULoxetine 30 MG EC capsule   Commonly known as:  CYMBALTA   Take 1 capsule (30 mg) by mouth 2 times daily   Last time this was given:  30 mg on 1/26/2018  8:48 AM                                furosemide 80 MG tablet   Commonly known as:  LASIX   Take 40 mg by mouth 2 times daily   Last time this was given:  40 mg on 1/26/2018  8:48 AM                                insulin glargine 100 UNIT/ML injection   Commonly known as:  LANTUS   Inject 20 Units Subcutaneous At Bedtime   Last time this was given:  10 Units on 1/25/2018  9:13 PM                                insulin pen needle 31G X 6 MM   Commonly known as:  ULTICARE MINI   Use daily or as directed.                                 lactobacillus rhamnosus (GG) capsule   Take 1 capsule by mouth 2 times daily                                miconazole 2 % powder   Commonly known as:  MICATIN; MICRO GUARD   Apply topically 2 times daily                                MULTIVITAMIN PO   1 tablet by mouth daily                                NovoLOG FLEXPEN 100 UNIT/ML injection   Inject 4 units SQ with breakfast, lunch and dinner.   Last time this was given:  4 Units on 1/26/2018 12:37 PM   Generic drug:  insulin aspart                                * order for DME   Equipment being ordered: Compression stockings, 20-30 MMHG, knee high                                * order for DME   Equipment being ordered: TEDS stocking  Below the knee 15-20 mg Dispense 2 Use daily                                * order for DME   1 wheelchair                                * order for DME   Equipment being ordered: Right Lower extremity Solaris Ready wrap calf piece:  Size small/length tall , knee piece: Size small , Thigh piece size small/length average.                                tolterodine 2 MG 24 hr capsule   Commonly known as:  DETROL LA   TAKE 1 CAPSULE (2 MG) BY MOUTH DAILY                                Urea 20 % Crea cream   Apply topically daily   Last time this was given:  1/26/2018  8:53 AM                                * Notice:  This list has 6 medication(s) that are the same as other medications prescribed for you. Read the directions carefully, and ask your doctor or other care provider to review them with you.
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
## 2018-01-24 NOTE — ED NOTES
Per family, patient was seen at kidney clinic today, labs drawn. Patient called by clinic for potassium = 6.4 and elevated Cr. Patient c/o dizziness when patient got to the clinic, dizziness now resolved. Also c/o feeling itching ongoing x 1 week. Denies any pain. Reports has chronic shortness of breath. No urinary symptoms.

## 2018-01-24 NOTE — H&P
"    INTERNAL MEDICINE HISTORY & PHYSICAL   Supriya Herr ([id number]) admitted on 1/24/2018  Primary care provider: Noam Jackson         Chief Complaint:     \"I was told my potassium is high\"         History of Present Illness   Supriya Herr is a 68 year old female with history of DM II, CKD, HTN, chronic anemia, and prior left traumatic AKA who presented to clinic for routine check and was found to have hyperkalemia with mild EKG changes.    Prior to today's office visit, Supriya has been at her usual state of health. Her hypertension medications have recently been changing due to low blood pressures she has at home. Her daughter wonders if she previously was not taking her medications regularly and this was leading to falsely elevated BP readings and that now she is taking her BP medications her values have been low. She will occasionally feel faint upon standing. Denies any chest pain/pressure, SOB, headache, fevers, chills, abdominal pain, nausea, loose stools. Has not been around anyone that she knows is ill.          Past Medical History     Patient Active Problem List   Diagnosis     Vitiligo     Traumatic amputation of leg above knee (H)     Contact dermatitis and other eczema, due to unspecified cause     Dermatophytosis of nail     Generalized osteoarthrosis, unspecified site     Hypertension goal BP (blood pressure) < 140/90     Mild nonproliferative diabetic retinopathy (H)     Proteinuria     Stage I pressure ulcer     Hyperlipidemia LDL goal <100     Non compliance with medical treatment     Tobacco abuse     Advanced directives, counseling/discussion     Incontinence of urine     Basal cell carcinoma     Senile nuclear sclerosis     JONE (obstructive sleep apnea)     CHF (congestive heart failure) (H)     Health Care Home     Chronic kidney disease, stage 4 (severe) (H)     Type 2 diabetes, HbA1C goal < 8% (H)     Type 2 diabetes mellitus with diabetic chronic kidney disease (H)     " Morbid obesity (H)     Type 2 diabetes mellitus with stage 3 chronic kidney disease, without long-term current use of insulin (H)     Moderate recurrent major depression (H)     Type 2 diabetes mellitus with diabetic neuropathy, with long-term current use of insulin (H)     Cellulitis     Macular cyst, hole, or pseudohole of retina     Cellulitis of right leg     Anemia of chronic renal failure, stage 4 (severe) (H)           Past Surgical History       Past Surgical History:   Procedure Laterality Date     EYE SURGERY  Feb 2012    Repair of hole in left retina     PHACOEMULSIFICATION WITH STANDARD INTRAOCULAR LENS IMPLANT  5/6/13    left     PHACOEMULSIFICATION WITH STANDARD INTRAOCULAR LENS IMPLANT  5/6/2013    Procedure: PHACOEMULSIFICATION WITH STANDARD INTRAOCULAR LENS IMPLANT;  Left Kelman Phacoemulsification with Intraocular Lens Implant;  Surgeon: Mat Valdes MD;  Location: [facility]     RELEASE TRIGGER FINGER  6/27/2014    Procedure: RELEASE TRIGGER FINGER;  Surgeon: Santi Pedraza MD;  Location: [facility]     SURGICAL HISTORY OF -   1989    amputation above left knee     SURGICAL HISTORY OF -   1989    right foot, open reduction and pinning     SURGICAL HISTORY OF -   1989    pinning right hip     SURGICAL HISTORY OF -   2006    colon screening declined          Medications     No current facility-administered medications on file prior to encounter.   Current Outpatient Prescriptions on File Prior to Encounter:  furosemide (LASIX) 80 MG tablet Take 0.5 tablets (40 mg) by mouth 2 times daily Patient taking 80mg in AM, 40mg in PM   amLODIPine (NORVASC) 5 MG tablet Take 1 tablet (5 mg) by mouth daily   spironolactone (ALDACTONE) 25 MG tablet Take 1 tablet (25 mg) by mouth daily   Urea 20 % CREA cream Apply topically daily   NOVOLOG FLEXPEN 100 UNIT/ML soln Inject 4 units SQ with breakfast, lunch and dinner.   hydrALAZINE (APRESOLINE) 100 MG TABS tablet Take 0.5 tablets (50 mg) by mouth 2 times daily    order for DME Equipment being ordered: Right Lower extremity Solaris Ready wrap calf piece:  Size small/length tall , knee piece: Size small , Thigh piece size small/length average.   blood glucose monitoring (ONETOUCH ULTRA) test strip Test your blood sugar 3-4 times per day.   acetaminophen (TYLENOL) 325 MG tablet Take 2 tablets (650 mg) by mouth 4 times daily   insulin glargine (LANTUS) 100 UNIT/ML injection Inject 20 Units Subcutaneous At Bedtime   lactobacillus rhamnosus, GG, (CULTURELL) capsule Take 1 capsule by mouth 2 times daily   lisinopril (PRINIVIL/ZESTRIL) 40 MG tablet Take 1 tablet (40 mg) by mouth daily   miconazole (MICATIN; MICRO GUARD) 2 % powder Apply topically 2 times daily   order for DME 1 wheelchair   tolterodine (DETROL LA) 2 MG 24 hr capsule TAKE 1 CAPSULE (2 MG) BY MOUTH DAILY   DULoxetine (CYMBALTA) 30 MG EC capsule Take 1 capsule (30 mg) by mouth 2 times daily   blood glucose monitoring (ONE TOUCH ULTRA) test strip Use to test blood sugars 2 times daily or as directed.   atorvastatin (LIPITOR) 20 MG tablet Take 1 tablet (20 mg) by mouth daily   carvedilol (COREG) 25 MG tablet Take 1 tablet (25 mg) by mouth 2 times daily (with meals)   order for DME Equipment being ordered: TEDS stocking Below the knee 15-20 mgDispense 2Use daily   albuterol (PROAIR HFA, PROVENTIL HFA, VENTOLIN HFA) 108 (90 BASE) MCG/ACT inhaler Inhale 2 puffs into the lungs every 6 hours as needed for shortness of breath / dyspnea or wheezing   insulin pen needle (ULTICARE MINI) 31G X 6 MM Use daily or as directed.   ORDER FOR DME Equipment being ordered: Compression stockings, 20-30 MMHG, knee high   ASPIRIN NOT PRESCRIBED, INTENTIONAL, 1 each continuous prn Antiplatelet medication not prescribed intentionally due to current nosebleeds   MULTIVITAMIN OR 1 tablet by mouth daily          Allergies     Allergies   Allergen Reactions     Nkda [No Known Drug Allergies]             Social History     Social History  "    Social History     Marital status:      Spouse name: N/A     Number of children: N/A     Years of education: N/A     Occupational History      Disabled     Social History Main Topics     Smoking status: Light Tobacco Smoker     Packs/day: 0.50     Years: 40.00     Types: Cigarettes     Smokeless tobacco: Never Used      Comment: 5 per day     Alcohol use No     Drug use: No     Sexual activity: No     Other Topics Concern     Parent/Sibling W/ Cabg, Mi Or Angioplasty Before 65f 55m? No     Social History Narrative            Family History     Family History   Problem Relation Age of Onset     DIABETES Mother      Hypertension Mother      HEART DISEASE Mother       of congestive heart failure     Eye Disorder Mother      Arthritis Mother      Obesity Mother      HEART DISEASE Father       from CHF     CEREBROVASCULAR DISEASE Father      Arthritis Father      Musculoskeletal Disorder Other      has MS     Thyroid Disease Other      Eye Disorder Other      cataracts     CANCER Other      throat/liver            Review of Systems     Review Of Systems: negative other than listed above in HPI         Vitals and Exam     Physical exam:  /66  Temp 97.8  F (36.6  C) (Oral)  Resp 15  Ht 1.676 m (5' 6\")  Wt 89.8 kg (198 lb)  SpO2 99%  BMI 31.96 kg/m2  Wt Readings from Last 2 Encounters:   18 89.8 kg (198 lb)   18 90 kg (198 lb 8 oz)     No intake or output data in the 24 hours ending 18 1711    Physical Exam:   General: Pleasant female, comfortable, NAD  HEENT: No Scleral icterus. NCAT, mucus membranes tacky. PERRL, EOMI.  Cardiac: Normal rate, regular rhythm. No m/r/g. Normal S1, S2.  Pulm: CTAB, no wheezes, or crackles. Normal respiratory effort  Abd: Soft, obese, non distended, non tender abdomen. No hepatosplenomegaly.  Skin: No jaundice, beefy red rash under breast  Extremities: No LE edema, left AKA  Joints: No inflammation noted on joints  Neuro: A&Ox3, fluent speech, " no focal deficits  Psych: normal mood, full affect         Labs   CBC  Recent Labs  Lab 01/24/18  1054   HGB 9.0*       BMP  Recent Labs  Lab 01/24/18  1618 01/24/18  1252 01/24/18  1054   NA  --  142 141   POTASSIUM 5.8* 6.8* 6.4*   CHLORIDE  --  116* 112*   CO2  --  20 22   ANIONGAP  --  6 7   GLC  --  74 84   BUN  --  68* 68*   CR  --  4.38* 4.53*   GFRESTIMATED  --  10* 10*   GFRESTBLACK  --  12* 12*   JODY  --  9.2 9.0   MAG  --  2.4*  --    PHOS  --  4.7* 4.9*     INRNo lab results found in last 7 days.    Liver panel  Recent Labs  Lab 01/24/18  1054   ALBUMIN 2.6*     Urinalysis  Recent Labs   Lab Test  11/02/17   0602   12/05/12   1541   COLOR  Light Yellow   < >  Yellow   APPEARANCE  Clear   < >  Clear   URINEGLC  300*   < >  100*   URINEBILI  Negative   < >  Negative   URINEKETONE  Negative   < >  Negative   SG  1.010   < >  1.025   UBLD  Negative   < >  Small*   URINEPH  7.5*   < >  6.0   PROTEIN  >600*   < >  100*   UROBILINOGEN   --    --   0.2   NITRITE  Negative   < >  Negative   LEUKEST  Small*   < >  Negative   RBCU  1   < >   --    WBCU  44*   < >   --     < > = values in this interval not displayed.       Imaging/procedure results:  Renal U/S 11/06/17:  IMPRESSION:  1. Thin, echogenic renal cortices consistent with medical renal  disease.  2. No hydronephrosis.    ASSESSMENT & PLAN :  Supriya Herr is a 68 year old female with history of DM II, CKD, HTN, chronic anemia, and prior left traumatic AKA who presented to clinic for routine check and was found to have hyperkalemia with mild EKG changes being admitted for management.    #Hyperkalemia  #ESRD  Her acute hyperkalemia is likely related to her underlying and progressing CKD and lisinopril and spironolactone. Was shifted in the ED with good response in potassium, also received calcium and kayexalate. Has been seen by nephrology, no need for dialysis at this point. Will give some IV fluids and continue lasix for potassium removal. Hold  lisinopril and spironolactone given creatinine and potassium. At this point, difficult to tell if rise in creatinine is simply progression of CKD vs LORI on CKD, will continue to trend.  -s/p calcium  -q4h K checks  -shift prn  -hold lisinopril and spironolactone  -continue lasix with IV fluids  -nephrology following - appreciate assistance  -UA    #HTN  Medication regimen has been evolving recently with concern for hypotension. Will continue home regimen for now, save for lisinopril as above, and adjust based on BP obtained while admitted. Will have nephrology provide guidance in alterations that may or may not be needed.  -amlodipine 10 mg daily  -hydralazine 50 mg BID  -coreg 25 mg BID  -lasix 40 mg BID    #DM II  Most recent A1C shows patient at goal. Has neuropathy, will continue home medications.  -novolog and glargine  -duloxetine 30 mg BID    #Candidiasis  Beefy rash under breast and in groin, looks like yeast infection.  -topical anti-fungal    FEN: renal  Prophylaxis:  DVT: heparin  Disposition: pending improvement in potassium  Code Status: FULL CODE    Nicola Thorne  Internal Medicine PGY2    Patient was seen and discussed with attending physician Dr. RAMSEY.

## 2018-01-24 NOTE — MR AVS SNAPSHOT
After Visit Summary   1/24/2018    Supriya Herr    MRN: [id number]           Patient Information     Date Of Birth          1950        Visit Information        Provider Department      1/24/2018 11:30 AM  NEPHROLOGY NURSE [facility] Trans[facility]        Today's Diagnoses     Vitamin D deficiency    -  1       Follow-ups after your visit        Your next 10 appointments already scheduled     Jan 26, 2018  3:00 PM CST   Office Visit with Noam Jackson MD   [facility] ([facility])    [address] [place] [id number] [phone]           Bring a current list of meds and any records pertaining to this visit. For Physicals, please bring immunization records and any forms needing to be filled out. Please arrive 10 minutes early to complete paperwork.            Feb 06, 2018 11:00 AM CST   New Visit with Zari Thakkar LP   [facility] ([place] [address] [address]   [phone]            Feb 06, 2018 12:00 PM CST   (Arrive by 11:45 AM)   Office Visit with Brenden Blackwell [facility] Medication Therapy Management ([facility])    [address] [place] [id number] [phone]           Bring a current list of meds and any records pertaining to this visit. For Physicals, please bring immunization records and any forms needing to be filled out. Please arrive 10 minutes early to complete paperwork.            Feb 16, 2018  3:00 PM CST   Return Visit with Zari Thakkar LP   [facility] ([place] [address] [place] [id number]   [phone]            Feb 21, 2018 10:45 AM CST   Lab with  LAB    [facility] ([facility])    [address] [place] [address]  "[id number]   [phone]            Feb 21, 2018 11:35 AM CST   (Arrive by 11:05 AM)   Return Visit with Kathryn Basilio MD   [facility] Nephrology ([facility])    [address]  [address]  [facility] [address]   [phone]            Mar 02, 2018 11:30 AM CST   (Arrive by 11:15 AM)   RETURN DIABETES with Arabella Kamara PA-C   [facility] Endocrinology ([facility])    [address]  3rd Floor  [facility] [id number] [phone]            Mar 02, 2018 12:40 PM CST   (Arrive by 12:25 PM)   RETURN FOOT/ANKLE with Eleazar Pack DPM   [facility] ([facility])    [address]  4th [facility] [id number] [phone]              Who to contact     If you have questions or need follow up information about today's clinic visit or your schedule please contact [facility] directly at [phone].  Normal or non-critical lab and imaging results will be communicated to you by Stemgenthart, letter or phone within 4 business days after the clinic has received the results. If you do not hear from us within 7 days, please contact the clinic through Stemgenthart or phone. If you have a critical or abnormal lab result, we will notify you by phone as soon as possible.  Submit refill requests through My-Hammer or call your pharmacy and they will forward the refill request to us. Please allow 3 business days for your refill to be completed.          Additional Information About Your Visit        Stemgenthart Information     My-Hammer lets you send messages to your doctor, view your test results, renew your prescriptions, schedule appointments and more. To sign up, go to [email or web address] . Click on \"Log in\" on the left side of the screen, which will take you to the Welcome page. Then click on \"Sign up Now\" on the right side of the page.     You will be asked to enter the " access code listed below, as well as some personal information. Please follow the directions to create your username and password.     Your access code is: [email or web address]  Expires: 3/13/2018  4:12 PM     Your access code will  in 90 days. If you need help or a new code, please call your [facility] clinic or [phone].        Care EveryWhere ID     This is your Care EveryWhere ID. This could be used by other organizations to access your [facility] medical records  [id number]        Your Vitals Were     Pulse Pulse Oximetry                66 97%           Blood Pressure from Last 3 Encounters:   18 134/59   18 95/60   18 (!) 83/40    Weight from Last 3 Encounters:   18 89.8 kg (198 lb)   18 90 kg (198 lb 8 oz)   17 92.1 kg (203 lb)              Today, you had the following     No orders found for display         Today's Medication Changes          These changes are accurate as of 18 12:51 PM.  If you have any questions, ask your nurse or doctor.               Start taking these medicines.        Dose/Directions    calcitRIOL 0.25 MCG capsule   Commonly known as:  ROCALTROL   Used for:  Vitamin D deficiency        Dose:  0.25 mcg   Take 1 capsule (0.25 mcg) by mouth daily   Quantity:  30 capsule   Refills:  3            Where to get your medicines      These medications were sent to [facility] #[id number] - [place], [place] - [address] [place] [address]  [address] [facility] [address] [facility] [address]     Phone:  [phone]     calcitRIOL 0.25 MCG capsule                Primary Care Provider Office Phone # Fax #    Noam Jackson [phone]       [address] [facility] [address]  [facility] [address]        Equal Access to Services     KALYANI WARREN : Emeterio Lam, kristin crocker, karina duenasalmada ailin, madeline chaudhari. So [facility] [phone].    ATENCIÓN: Si habla español, tiene a santoro disposición servicios gratuitos de  asistencia lingüística. Renuka al [phone].    We comply with applicable federal civil rights laws and [place] laws. We do not discriminate on the basis of race, color, national origin, age, disability, sex, sexual orientation, or gender identity.            Thank you!     Thank you for choosing [facility]  for your care. Our goal is always to provide you with excellent care. Hearing back from our patients is one way we can continue to improve our services. Please take a few minutes to complete the written survey that you may receive in the mail after your visit with us. Thank you!             Your Updated Medication List - Protect others around you: Learn how to safely use, store and throw away your medicines at www.disposemymeds.org.          This list is accurate as of 1/24/18 12:51 PM.  Always use your most recent med list.                   Brand Name Dispense Instructions for use Diagnosis    acetaminophen 325 MG tablet    TYLENOL    100 tablet    Take 2 tablets (650 mg) by mouth 4 times daily    Cellulitis of right leg       albuterol 108 (90 BASE) MCG/ACT Inhaler    PROAIR HFA/PROVENTIL HFA/VENTOLIN HFA    3 Inhaler    Inhale 2 puffs into the lungs every 6 hours as needed for shortness of breath / dyspnea or wheezing    Cough       amLODIPine 5 MG tablet    NORVASC    30 tablet    Take 1 tablet (5 mg) by mouth daily    Type 2 diabetes mellitus with stage 3 chronic kidney disease, without long-term current use of insulin (H), Other fatigue       ASPIRIN NOT PRESCRIBED    INTENTIONAL    0 each    1 each continuous prn Antiplatelet medication not prescribed intentionally due to current nosebleeds    Anemia due to blood loss, acute       atorvastatin 20 MG tablet    LIPITOR    90 tablet    Take 1 tablet (20 mg) by mouth daily    Hyperlipidemia LDL goal <100       * blood glucose monitoring test strip    ONETOUCH ULTRA    200 strip    Use to test blood sugars 2 times daily or as  directed.    Type 2 diabetes mellitus with stage 3 chronic kidney disease, without long-term current use of insulin (H)       * blood glucose monitoring test strip    ONETOUCH ULTRA    200 strip    Test your blood sugar 3-4 times per day.    Type 2 diabetes mellitus with hyperglycemia, with long-term current use of insulin (H)       calcitRIOL 0.25 MCG capsule    ROCALTROL    30 capsule    Take 1 capsule (0.25 mcg) by mouth daily    Vitamin D deficiency       carvedilol 25 MG tablet    COREG    180 tablet    Take 1 tablet (25 mg) by mouth 2 times daily (with meals)    Essential hypertension with goal blood pressure less than 140/90       DULoxetine 30 MG EC capsule    CYMBALTA    180 capsule    Take 1 capsule (30 mg) by mouth 2 times daily    Type 2 diabetes mellitus with diabetic nephropathy, with long-term current use of insulin (H), Diabetic polyneuropathy associated with diabetes mellitus due to underlying condition (H)       furosemide 80 MG tablet    LASIX    90 tablet    Take 0.5 tablets (40 mg) by mouth 2 times daily Patient taking 80mg in AM, 40mg in PM    Lymphedema of both lower extremities       hydrALAZINE 100 MG Tabs tablet    APRESOLINE    180 tablet    Take 0.5 tablets (50 mg) by mouth 2 times daily    Hypertension goal BP (blood pressure) < 140/90       insulin glargine 100 UNIT/ML injection    LANTUS    3 mL    Inject 20 Units Subcutaneous At Bedtime    Type 2 diabetes mellitus with diabetic neuropathy, with long-term current use of insulin (H)       insulin pen needle 31G X 6 MM    ULTICARE MINI    100 each    Use daily or as directed.    Type 2 diabetes, HbA1c goal < 7% (H)       lactobacillus rhamnosus (GG) capsule     60 capsule    Take 1 capsule by mouth 2 times daily    Cellulitis of right leg       lisinopril 40 MG tablet    PRINIVIL/ZESTRIL    60 tablet    Take 1 tablet (40 mg) by mouth daily    Hypertension goal BP (blood pressure) < 140/90       miconazole 2 % powder    MICATIN; MICRO  GUARD    71 g    Apply topically 2 times daily    Fungal dermatitis       MULTIVITAMIN PO      1 tablet by mouth daily        NovoLOG FLEXPEN 100 UNIT/ML injection   Generic drug:  insulin aspart     15 mL    Inject 4 units SQ with breakfast, lunch and dinner.    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    Edema of lower extremity       spironolactone 25 MG tablet    ALDACTONE    60 tablet    Take 1 tablet (25 mg) by mouth daily    Hypertension goal BP (blood pressure) < 140/90       tolterodine 2 MG 24 hr capsule    DETROL LA    60 capsule    TAKE 1 CAPSULE (2 MG) BY MOUTH DAILY    Urge incontinence of urine       Urea 20 % Crea cream     85 g    Apply topically daily    Xerosis cutis, Tyloma       * Notice:  This list has 6 medication(s) that are the same as other medications prescribed for you. Read the directions carefully, and ask your doctor or other care provider to review them with you.

## 2018-01-24 NOTE — H&P
INTERNAL MEDICINE HISTORY & PHYSICAL   Supriya Herr ([id number]) admitted on 1/24/2018  Primary care provider: Noam Jackson         Chief Complaint:     Hyperkalemia-incidentally found in outpatient renal clinic         History of Present Illness     Supriya Herr is a 68 year old female with history of DMII, CKD, HTN, recently hospitalized for R leg cellulitis, which subsequently resolved, who currently presents to the ED from outpatient clinic with abnormal labs.  She was previously at renal clinic for a follow-up visit earlier today, where she was found to have a K+ of 6.4, and Cr 4.5 and referred to the ED.   She is accompanied by her daughter.  She reports no acute illness, her only complaint was of some minor dizziness and SOB with transferring from her wheelchair to her car.  Her daughter appears to be tearful and quite anxious about her mother's condition, she is especially fearful of her needing dialysis. Her daughter does note many of her mother's blood pressure medication doses have been recently reduced by her outpatient renal physician, given her recent history of low BPs at home and in clinic.  Daughter attributes this to the fact that her mother was previously responsible for taking her own hypertension medications, but had not been very stringent with taking them, as such may have been dosed too high per her noncompliance.  She has subsequently taken over giving her mother her blood pressure medications, which may have led to the over treatment of her hypertension.  She notes her home amlodipine was reduced from 10mg to 5mg, hydralazine from 100mg to 50mg BID, and lasix from 80 to 40mg BID over the past few weeks. Patient denies any heart palpitations, cough, wheezing, n/v, sick contacts or recent travel. She does note urinating 4-5x daily and nocturia of 2-3x; however, this is her baseline normal.  She also denies any urinary frequency, dysuria, or urgency.  She endorses some  minor itching recently, but is likely secondary to dry skin from cold weather, she also does report itching in her intertrigonal folds under the breasts and in the groin for which she has not used anything topically to treat.  She is otherwise doing well with no overt acute changes to her health.         Past Medical History       Patient Active Problem List   Diagnosis     Vitiligo     Traumatic amputation of leg above knee (H)     Contact dermatitis and other eczema, due to unspecified cause     Dermatophytosis of nail     Generalized osteoarthrosis, unspecified site     Hypertension goal BP (blood pressure) < 140/90     Mild nonproliferative diabetic retinopathy (H)     Proteinuria     Stage I pressure ulcer     Hyperlipidemia LDL goal <100     Non compliance with medical treatment     Tobacco abuse     Advanced directives, counseling/discussion     Incontinence of urine     Basal cell carcinoma     Senile nuclear sclerosis     JONE (obstructive sleep apnea)     CHF (congestive heart failure) (H)     Health Care Home     Chronic kidney disease, stage 4 (severe) (H)     Type 2 diabetes, HbA1C goal < 8% (H)     Type 2 diabetes mellitus with diabetic chronic kidney disease (H)     Morbid obesity (H)     Type 2 diabetes mellitus with stage 3 chronic kidney disease, without long-term current use of insulin (H)     Moderate recurrent major depression (H)     Type 2 diabetes mellitus with diabetic neuropathy, with long-term current use of insulin (H)     Cellulitis     Macular cyst, hole, or pseudohole of retina     Cellulitis of right leg     Anemia of chronic renal failure, stage 4 (severe) (H)     Hyperkalemia           Past Surgical History     Past Surgical History:   Procedure Laterality Date     EYE SURGERY  Feb 2012    Repair of hole in left retina     PHACOEMULSIFICATION WITH STANDARD INTRAOCULAR LENS IMPLANT  5/6/13    left     PHACOEMULSIFICATION WITH STANDARD INTRAOCULAR LENS IMPLANT  5/6/2013    Procedure:  PHACOEMULSIFICATION WITH STANDARD INTRAOCULAR LENS IMPLANT;  Left Kelman Phacoemulsification with Intraocular Lens Implant;  Surgeon: Mat Valdes MD;  Location: [facility] OR     RELEASE TRIGGER FINGER  6/27/2014    Procedure: RELEASE TRIGGER FINGER;  Surgeon: Santi Pedraza MD;  Location: [facility] OR     SURGICAL HISTORY OF -   1989    amputation above left knee     SURGICAL HISTORY OF -   1989    right foot, open reduction and pinning     SURGICAL HISTORY OF -   1989    pinning right hip     SURGICAL HISTORY OF -   2006    colon screening declined            Medications     No current facility-administered medications on file prior to encounter.   Current Outpatient Prescriptions on File Prior to Encounter:  furosemide (LASIX) 80 MG tablet Take 40 mg by mouth 2 times daily    amLODIPine (NORVASC) 5 MG tablet Take 1 tablet (5 mg) by mouth daily   spironolactone (ALDACTONE) 25 MG tablet Take 1 tablet (25 mg) by mouth daily   Urea 20 % CREA cream Apply topically daily   NOVOLOG FLEXPEN 100 UNIT/ML soln Inject 4 units SQ with breakfast, lunch and dinner.   hydrALAZINE (APRESOLINE) 100 MG TABS tablet Take 0.5 tablets (50 mg) by mouth 2 times daily   order for DME Equipment being ordered: Right Lower extremity Solaris Ready wrap calf piece:  Size small/length tall , knee piece: Size small , Thigh piece size small/length average.   blood glucose monitoring (ONETOUCH ULTRA) test strip Test your blood sugar 3-4 times per day.   acetaminophen (TYLENOL) 325 MG tablet Take 2 tablets (650 mg) by mouth 4 times daily (Patient taking differently: Take 650 mg by mouth 4 times daily as needed for mild pain )   insulin glargine (LANTUS) 100 UNIT/ML injection Inject 20 Units Subcutaneous At Bedtime   lactobacillus rhamnosus, GG, (CULTURELL) capsule Take 1 capsule by mouth 2 times daily   lisinopril (PRINIVIL/ZESTRIL) 40 MG tablet Take 1 tablet (40 mg) by mouth daily   miconazole (MICATIN; MICRO GUARD) 2 % powder Apply topically 2  times daily   order for DME 1 wheelchair   tolterodine (DETROL LA) 2 MG 24 hr capsule TAKE 1 CAPSULE (2 MG) BY MOUTH DAILY   DULoxetine (CYMBALTA) 30 MG EC capsule Take 1 capsule (30 mg) by mouth 2 times daily   blood glucose monitoring (ONE TOUCH ULTRA) test strip Use to test blood sugars 2 times daily or as directed.   atorvastatin (LIPITOR) 20 MG tablet Take 1 tablet (20 mg) by mouth daily   carvedilol (COREG) 25 MG tablet Take 1 tablet (25 mg) by mouth 2 times daily (with meals)   order for DME Equipment being ordered: TEDS stocking Below the knee 15-20 mgDispense 2Use daily   albuterol (PROAIR HFA, PROVENTIL HFA, VENTOLIN HFA) 108 (90 BASE) MCG/ACT inhaler Inhale 2 puffs into the lungs every 6 hours as needed for shortness of breath / dyspnea or wheezing   insulin pen needle (ULTICARE MINI) 31G X 6 MM Use daily or as directed.   ORDER FOR DME Equipment being ordered: Compression stockings, 20-30 MMHG, knee high   ASPIRIN NOT PRESCRIBED, INTENTIONAL, 1 each continuous prn Antiplatelet medication not prescribed intentionally due to current nosebleeds   MULTIVITAMIN OR 1 tablet by mouth daily            Allergies     Allergies   Allergen Reactions     Nkda [No Known Drug Allergies]             Social History     Social History     Social History     Marital status:      Spouse name: N/A     Number of children: N/A     Years of education: N/A     Occupational History      Disabled     Social History Main Topics     Smoking status: Light Tobacco Smoker     Packs/day: 0.50     Years: 40.00     Types: Cigarettes     Smokeless tobacco: Never Used      Comment: 5 per day     Alcohol use No     Drug use: No     Sexual activity: No     Other Topics Concern     Parent/Sibling W/ Cabg, Mi Or Angioplasty Before 65f 55m? No     Social History Narrative            Family History     Family History   Problem Relation Age of Onset     DIABETES Mother      Hypertension Mother      HEART DISEASE Mother       of  "congestive heart failure     Eye Disorder Mother      Arthritis Mother      Obesity Mother      HEART DISEASE Father       from CHF     CEREBROVASCULAR DISEASE Father      Arthritis Father      Musculoskeletal Disorder Other      has MS     Thyroid Disease Other      Eye Disorder Other      cataracts     CANCER Other      throat/liver            Review of Systems     Review Of Systems  Skin: positive for itching  Eyes: negative for, visual blurring, double vision, positive for deafness in R ear  Ears/Nose/Throat: negative for, nasal congestion  Respiratory: No shortness of breath, cough, or hemoptysis, mild dyspnea on exertion  Cardiovascular: negative for, palpitations and chest pain  Gastrointestinal: negative for nausea and vomiting  Genitourinary: negative for, dysuria, frequency, urgency and hematuria  Neurologic: positive for headaches, dizziness  Hematologic/Lymphatic/Immunologic: negative for, chills, fever and swollen nodes  Endocrine: positive for negative and polydipsia         Vitals and Exam     Physical exam:  /62 (BP Location: Right arm)  Temp 97.3  F (36.3  C) (Oral)  Resp 16  Ht 1.676 m (5' 6\")  Wt 89.8 kg (198 lb)  SpO2 96%  BMI 31.96 kg/m2  Wt Readings from Last 2 Encounters:   18 89.8 kg (198 lb)   18 90 kg (198 lb 8 oz)     No intake or output data in the 24 hours ending 18 1813    Physical Exam:   General: Pleasant female, NAD  HEENT: No Scleral icterus. PERRL, EOMI, dry oral mucusa  Cardiac: Normal rate, regular rhythm. No m/r/g. Normal S1, S2., R DP/PT, no JVD  Pulm: CTAB, no wheezes, or crackles. Normal respiratory effort  Abd: Soft, obese, non distended, non tender abdomen. No hepatosplenomegaly.  Skin: No jaundice, no palpable nodes, erythematous non-purulent beefy appearing macular rash covering intertrigonal skin folds under both breasts and groin, stasis dermatitis on R lower leg,  no other rashes noted  Extremities: L leg amputated above knee, No LE " edema  Joints: No inflammation noted on joints  Neuro: A&Ox3, no focal deficits  Psych: full affect            Labs   CBC  Recent Labs  Lab 01/24/18  1054   HGB 9.0*       BMP  Recent Labs  Lab 01/24/18  1618 01/24/18  1252 01/24/18  1054   NA  --  142 141   POTASSIUM 5.8* 6.8* 6.4*   CHLORIDE  --  116* 112*   CO2  --  20 22   ANIONGAP  --  6 7   GLC  --  74 84   BUN  --  68* 68*   CR  --  4.38* 4.53*   GFRESTIMATED  --  10* 10*   GFRESTBLACK  --  12* 12*   JODY  --  9.2 9.0   MAG  --  2.4*  --    PHOS  --  4.7* 4.9*        INRNo lab results found in last 7 days.    Liver panel  Recent Labs  Lab 01/24/18  1054   ALBUMIN 2.6*       Urinalysis  Recent Labs   Lab Test  11/02/17   0602   12/05/12   1541   COLOR  Light Yellow   < >  Yellow   APPEARANCE  Clear   < >  Clear   URINEGLC  300*   < >  100*   URINEBILI  Negative   < >  Negative   URINEKETONE  Negative   < >  Negative   SG  1.010   < >  1.025   UBLD  Negative   < >  Small*   URINEPH  7.5*   < >  6.0   PROTEIN  >600*   < >  100*   UROBILINOGEN   --    --   0.2   NITRITE  Negative   < >  Negative   LEUKEST  Small*   < >  Negative   RBCU  1   < >   --    WBCU  44*   < >   --     < > = values in this interval not displayed.         Imaging/procedure results:    EKG: normal sinus rhythm with peaked T waves in multiple leads (01/24/18)    ASSESSMENT & PLAN :  Supriya Herr is a 68 year old female with history of DMII, CKD IV, HTN, who currently presents to the ED from outpatient clinic with incidental findings of hyperkalemia and worsening Cr likely 2/2 progression of CKD.     1) Hyperkalemia: likely 2/2 progression of CKD in setting of spironolactone and lisinopril use.  Presented to ED with K 6.8, with EKG showing sinus rhythm but clearly elevated tented Twave appearance.  Longstanding CKD with Alb:Cr 6037 nephrotic syndrome. Her baseline Cr was 1.35, BUN 16, and GFR 39 in 10/2016 and has steadily been worsening to 4.38, 68, and 10 today, respectively.  Steady  rise in Cr and BUN, along with drop in GFR lend to this thought.  BUN:Cr of 15, also points away from LORI pre-renal causes, and aligns more with intrinsic renal issues.  Was given kayexalate, had multiple BMs, K+ dropped to 5.8.  - hold home lisinopril   - hold home spironolactone  - continue home lasix 40mg BID  -  IV NS maintenance fluid   - monitor I/Os  - calcium carbonate for cardiac protection  - K+ checks q4h, give kayexalate until potassium normalizes  - UA with reflex UC  - renal is following     2) HTN: is normotensive since hospitalization.  Continue home regiment, reassess HTN drug management if BPs drop.  - continue home amlodipine 5mg  - hydralazine 50mg BID  - carvedilol 25mg  - hold lisinopril      3) DMII: Hgb A1C 6.8 12/2017, very well controlled. Is managed by outpatient endo.  - continue home regiment Novolog 4U/meals, Lantus 20U bedtime  - adjust with sliding scale    5) Neuropathy: gabapentin    6) Itching: erythematous beefy non-purulent rash noted in skin folds under breasts and in groin, likely 2/2 candida intertrigo.  - miconazole topical cream BID         FEN: Renal diet    Prophylaxis:  DVT: heparin subcutaenous   GI: none  Disposition: normalization of potassium, pending renal decision  Code Status: FULL CODE      Vale MonkSvitlana   [phone]    Patient was seen and discussed with attending physician Tejal Thorne and Dr. ROXY Roy who agrees with above assessment and plan.

## 2018-01-24 NOTE — IP AVS SNAPSHOT
[address] [address]    Phone:  [phone]                                       After Visit Summary   1/24/2018    Supriya Herr    MRN: [id number]           After Visit Summary Signature Page     I have received my discharge instructions, and my questions have been answered. I have discussed any challenges I see with this plan with the nurse or doctor.    ..........................................................................................................................................  Patient/Patient Representative Signature      ..........................................................................................................................................  Patient Representative Print Name and Relationship to Patient    ..................................................               ................................................  Date                                            Time    ..........................................................................................................................................  Reviewed by Signature/Title    ...................................................              ..............................................  Date                                                            Time

## 2018-01-24 NOTE — NURSING NOTE
Reason for Visit    Patient seen at the request of Dr. Basilio for BP check/ labs. Patient here with her daughter, Caroline.     Medication Review    Medication review completed.    Assessment    BP 95/60 (BP Location: Left arm, Patient Position: Chair, Cuff Size: Adult Large)  Pulse 66  SpO2 97%    No edema. Patient reported some dizziness just this morning, but no other episodes of dizziness recently.      Patient has had low BP readings in clinic recently. BP was 83/40 at her PCP's office on 1/9. At that time patient's amlodipine was decreased from 10 mg daily to 5 mg daily and her lasix was decreased from 80 mg BID to 40 mg BID. Home readings are reported at mostly being 130s/70s, however patient reports checking her BP prior to taking her morning medications.    She is feeling well, maybe a little more fatigued. Appetite is okay, no nausea, vomiting, chest pain, or shortness of breath.     Patient had labs done today, but they were not resulted until after her appointment. Labs show potassium of 6.4 and potassium elevated further to 4.5.     Called patient's daughter and recommended that patient be seen in the ER due to lab values. She verbalized understanding and will drive patient. Report called to [facility] ER.      Also reviewed with Dr. Basilio. Patient likely volume depleted. Will plan on decreasing lisinopril to 20 mg daily moving forward after she is evaluated in the ER.     Plan    1. ER today for hyperkalemia; volume depletion   2. Labs again in one week   3. Decrease lisinopril to 20 mg daily (have not reviewed with patient/ daughter yet- will call after ER visit unless patient is admitted)   4. Needs RRT education- will call patient's daughter to discuss     Amount of time spent with patient 60 minutes.    Patient was given an opportunity to ask questions and have those questions answered to her satisfaction.  Patient verbalized understanding of instructions provided and agreed to plan of care.    Pravin  Art, RN, BAN  Nephrology RN Care Coordinator

## 2018-01-24 NOTE — ED NOTES
Bed: ED20  Expected date: 1/24/18  Expected time: 12:21 PM  Means of arrival: Car  Comments:  Supriya Herr coming from home after abnormal labs found at a routine clinic visit. K=6.4 and creat=4.5
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
## 2018-01-25 LAB
ALBUMIN UR-MCNC: 100 MG/DL
ANION GAP SERPL CALCULATED.3IONS-SCNC: 8 MMOL/L (ref 3–14)
APPEARANCE UR: CLEAR
BILIRUB UR QL STRIP: NEGATIVE
BUN SERPL-MCNC: 59 MG/DL (ref 7–30)
CALCIUM SERPL-MCNC: 9 MG/DL (ref 8.5–10.1)
CHLORIDE SERPL-SCNC: 116 MMOL/L (ref 94–109)
CO2 SERPL-SCNC: 20 MMOL/L (ref 20–32)
COLOR UR AUTO: ABNORMAL
CREAT SERPL-MCNC: 4.16 MG/DL (ref 0.52–1.04)
ERYTHROCYTE [DISTWIDTH] IN BLOOD BY AUTOMATED COUNT: 15.3 % (ref 10–15)
GFR SERPL CREATININE-BSD FRML MDRD: 11 ML/MIN/1.7M2
GLUCOSE BLDC GLUCOMTR-MCNC: 170 MG/DL (ref 70–99)
GLUCOSE BLDC GLUCOMTR-MCNC: 170 MG/DL (ref 70–99)
GLUCOSE BLDC GLUCOMTR-MCNC: 80 MG/DL (ref 70–99)
GLUCOSE BLDC GLUCOMTR-MCNC: 84 MG/DL (ref 70–99)
GLUCOSE BLDC GLUCOMTR-MCNC: 95 MG/DL (ref 70–99)
GLUCOSE SERPL-MCNC: 79 MG/DL (ref 70–99)
GLUCOSE UR STRIP-MCNC: NEGATIVE MG/DL
HCT VFR BLD AUTO: 26.9 % (ref 35–47)
HGB BLD-MCNC: 8.2 G/DL (ref 11.7–15.7)
HGB UR QL STRIP: NEGATIVE
INR PPP: 1.06 (ref 0.86–1.14)
INTERPRETATION ECG - MUSE: NORMAL
KETONES UR STRIP-MCNC: NEGATIVE MG/DL
LEUKOCYTE ESTERASE UR QL STRIP: NEGATIVE
MCH RBC QN AUTO: 28 PG (ref 26.5–33)
MCHC RBC AUTO-ENTMCNC: 30.5 G/DL (ref 31.5–36.5)
MCV RBC AUTO: 92 FL (ref 78–100)
MUCOUS THREADS #/AREA URNS LPF: PRESENT /LPF
NITRATE UR QL: NEGATIVE
PH UR STRIP: 6.5 PH (ref 5–7)
PLATELET # BLD AUTO: 230 10E9/L (ref 150–450)
POTASSIUM SERPL-SCNC: 5.2 MMOL/L (ref 3.4–5.3)
POTASSIUM SERPL-SCNC: 5.2 MMOL/L (ref 3.4–5.3)
POTASSIUM SERPL-SCNC: 5.3 MMOL/L (ref 3.4–5.3)
POTASSIUM SERPL-SCNC: 5.7 MMOL/L (ref 3.4–5.3)
RBC # BLD AUTO: 2.93 10E12/L (ref 3.8–5.2)
RBC #/AREA URNS AUTO: 0 /HPF (ref 0–2)
SODIUM SERPL-SCNC: 144 MMOL/L (ref 133–144)
SOURCE: ABNORMAL
SP GR UR STRIP: 1.01 (ref 1–1.03)
SQUAMOUS #/AREA URNS AUTO: <1 /HPF (ref 0–1)
TRANS CELLS #/AREA URNS HPF: <1 /HPF (ref 0–1)
UROBILINOGEN UR STRIP-MCNC: NORMAL MG/DL (ref 0–2)
WBC # BLD AUTO: 4.7 10E9/L (ref 4–11)
WBC #/AREA URNS AUTO: 1 /HPF (ref 0–2)

## 2018-01-25 PROCEDURE — 12000008 ZZH R&B INTERMEDIATE UMMC

## 2018-01-25 PROCEDURE — 85027 COMPLETE CBC AUTOMATED: CPT | Performed by: STUDENT IN AN ORGANIZED HEALTH CARE EDUCATION/TRAINING PROGRAM

## 2018-01-25 PROCEDURE — A9270 NON-COVERED ITEM OR SERVICE: HCPCS | Mod: GY | Performed by: STUDENT IN AN ORGANIZED HEALTH CARE EDUCATION/TRAINING PROGRAM

## 2018-01-25 PROCEDURE — 00000146 ZZHCL STATISTIC GLUCOSE BY METER IP

## 2018-01-25 PROCEDURE — 25000132 ZZH RX MED GY IP 250 OP 250 PS 637: Mod: GY | Performed by: STUDENT IN AN ORGANIZED HEALTH CARE EDUCATION/TRAINING PROGRAM

## 2018-01-25 PROCEDURE — 81001 URINALYSIS AUTO W/SCOPE: CPT | Performed by: STUDENT IN AN ORGANIZED HEALTH CARE EDUCATION/TRAINING PROGRAM

## 2018-01-25 PROCEDURE — 85610 PROTHROMBIN TIME: CPT | Performed by: STUDENT IN AN ORGANIZED HEALTH CARE EDUCATION/TRAINING PROGRAM

## 2018-01-25 PROCEDURE — 40000141 ZZH STATISTIC PERIPHERAL IV START W/O US GUIDANCE

## 2018-01-25 PROCEDURE — G0463 HOSPITAL OUTPT CLINIC VISIT: HCPCS

## 2018-01-25 PROCEDURE — 40000802 ZZH SITE CHECK

## 2018-01-25 PROCEDURE — 84132 ASSAY OF SERUM POTASSIUM: CPT | Performed by: STUDENT IN AN ORGANIZED HEALTH CARE EDUCATION/TRAINING PROGRAM

## 2018-01-25 PROCEDURE — 99233 SBSQ HOSP IP/OBS HIGH 50: CPT | Mod: GC | Performed by: INTERNAL MEDICINE

## 2018-01-25 PROCEDURE — 25000128 H RX IP 250 OP 636: Performed by: STUDENT IN AN ORGANIZED HEALTH CARE EDUCATION/TRAINING PROGRAM

## 2018-01-25 PROCEDURE — 80048 BASIC METABOLIC PNL TOTAL CA: CPT | Performed by: STUDENT IN AN ORGANIZED HEALTH CARE EDUCATION/TRAINING PROGRAM

## 2018-01-25 PROCEDURE — 36415 COLL VENOUS BLD VENIPUNCTURE: CPT | Performed by: STUDENT IN AN ORGANIZED HEALTH CARE EDUCATION/TRAINING PROGRAM

## 2018-01-25 RX ORDER — SODIUM CHLORIDE 9 MG/ML
INJECTION, SOLUTION INTRAVENOUS CONTINUOUS
Status: ACTIVE | OUTPATIENT
Start: 2018-01-25 | End: 2018-01-26

## 2018-01-25 RX ORDER — SODIUM POLYSTYRENE SULFONATE 15 G/60ML
30 SUSPENSION ORAL; RECTAL ONCE
Status: COMPLETED | OUTPATIENT
Start: 2018-01-25 | End: 2018-01-25

## 2018-01-25 RX ADMIN — HYDRALAZINE HYDROCHLORIDE 50 MG: 50 TABLET ORAL at 08:13

## 2018-01-25 RX ADMIN — FUROSEMIDE 40 MG: 40 TABLET ORAL at 08:13

## 2018-01-25 RX ADMIN — NYSTATIN: 100000 CREAM TOPICAL at 20:17

## 2018-01-25 RX ADMIN — CALCITRIOL 0.25 MCG: 0.25 CAPSULE, LIQUID FILLED ORAL at 11:45

## 2018-01-25 RX ADMIN — DULOXETINE HYDROCHLORIDE 30 MG: 30 CAPSULE, DELAYED RELEASE ORAL at 20:18

## 2018-01-25 RX ADMIN — AMLODIPINE BESYLATE 5 MG: 5 TABLET ORAL at 08:13

## 2018-01-25 RX ADMIN — CARVEDILOL 25 MG: 25 TABLET, FILM COATED ORAL at 08:13

## 2018-01-25 RX ADMIN — INSULIN ASPART 4 UNITS: 100 INJECTION, SOLUTION INTRAVENOUS; SUBCUTANEOUS at 12:42

## 2018-01-25 RX ADMIN — SODIUM CHLORIDE: 9 INJECTION, SOLUTION INTRAVENOUS at 11:45

## 2018-01-25 RX ADMIN — INSULIN GLARGINE 10 UNITS: 100 INJECTION, SOLUTION SUBCUTANEOUS at 21:13

## 2018-01-25 RX ADMIN — INSULIN ASPART 4 UNITS: 100 INJECTION, SOLUTION INTRAVENOUS; SUBCUTANEOUS at 08:09

## 2018-01-25 RX ADMIN — CARVEDILOL 25 MG: 25 TABLET, FILM COATED ORAL at 17:57

## 2018-01-25 RX ADMIN — SODIUM CHLORIDE: 9 INJECTION, SOLUTION INTRAVENOUS at 15:39

## 2018-01-25 RX ADMIN — FUROSEMIDE 40 MG: 40 TABLET ORAL at 20:17

## 2018-01-25 RX ADMIN — Medication: at 08:22

## 2018-01-25 RX ADMIN — ATORVASTATIN CALCIUM 20 MG: 20 TABLET, FILM COATED ORAL at 08:13

## 2018-01-25 RX ADMIN — HYDRALAZINE HYDROCHLORIDE 50 MG: 50 TABLET ORAL at 20:18

## 2018-01-25 RX ADMIN — DULOXETINE HYDROCHLORIDE 30 MG: 30 CAPSULE, DELAYED RELEASE ORAL at 08:26

## 2018-01-25 RX ADMIN — SODIUM POLYSTYRENE SULFONATE 30 G: 15 SUSPENSION ORAL; RECTAL at 14:38

## 2018-01-25 RX ADMIN — INSULIN ASPART 4 UNITS: 100 INJECTION, SOLUTION INTRAVENOUS; SUBCUTANEOUS at 18:40

## 2018-01-25 NOTE — PLAN OF CARE
Patient admitted from [facility] for hyperkalemia. PIV on L hand. IV fluids started per orders. BG checked - 133. Dinner tray ordered but patient waiting to eat until her meal coverage Novolog pen is available. Daughter at bedside when patient arrived. Daughter is taking patient's medications and purse home. Patient's WC from home is at bedside. AKA on L leg. Patient able to slide from ED stretcher to bed but may benefit from a wheelchair accessible bathroom. Needs urine sample. Tele applied - NSR with ST depression noted. Potassium checks q 4 hours.
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
## 2018-01-25 NOTE — PLAN OF CARE
Problem: Patient Care Overview  Goal: Plan of Care/Patient Progress Review  Outcome: No Change  Time 9324-4078    Reason for admission: Hyperkalemia  Vitals: Stable. Tele showing NSR.  Activity: Up with assist d/t AKA on L leg. Uses home wheelchair in room.   Pain: Denies. Has PRN Bengay cream available for R knee as needed.   Neuro: A/O. Intact.   Cardiac: WDL. Tele NSR.   Respiratory: WDL.   GI/: Low urine output today. No BM reported this shift.   Diet: Renal diet.   Lines: New PIV today. Restarted IV fluids.   Skin/Wounds: WOC RN assessed rash under breasts and berta cleft.   Endocrine: BGs before meals and bedtime. BG in morning 80s. BG at lunch 170. Patient receives scheduled set meal coverage.   Labs/imaging: K labs q 6 hours. Last K 5.7.     New changes this shift: K 5.7 in afternoon. Kayexalte given and IV fluids started in PIV. Orthostatic BPs positive in morning - patient became too weak to continue to stand on her existing leg and had to sit at the end of BP check per NST report. Patient has been coloring in her coloring book most of day between cares. Sister visited at lunch time. Nephrology consult. Refusing heparin SQ injections - primary team was notified in morning.      Plan: Continue to monitor and treat hyperkalemia.     Continue to monitor and follow POC

## 2018-01-25 NOTE — PROGRESS NOTES
Nephrology Progress Note  01/25/2018         Assessment & Recommendations:   Supriya Herr is a 68 year old year old female    CKD stage 4 to 5: Due to diabetes, nephrotic range proteinuria, slight decline in her GFR as last visit with her nephrologist, due to recent rise potassium her lisinopril and Aldactone was on hold.  IV fluid was given.  We will continue holding lisinopril and Aldactone until she been evaluated by her nephrologist.  Lisinopril most likely can be restarted at the lower dose and monitor for hyperkalemia.    Hyperkalemia: Potassium was up to 6.8, managed by shifting, cardiac stabilization, increased secretion, IV fluid replacement Lasix.  Kayexalate was given.  Since her potassium will need for cardiac monitoring.    BP and volume: her BP is stable but she was orthostatic this AM, 152/43 to 136/27 HR stable at 70. Another 1 liter fluid was ordered. Cont home meds, cont holding Lisinopril and aldactone, might consider hold ing lasix today.     Anemia: Chronic disease hemoglobin 8.2, history of iron deficiency iron sat in November was less than 20, continues iron supplementation.  Need an outpatient colonoscopy.    Diabetes: Managed by primary team.    Recommendations were communicated to primary team via note    Seen and discussed with Dr. Adilson Kenny MD   [phone]    Interval History :   In the last 24 hours Supriya Herr feels fine, no new complaint, she denies any dizziness or lightheadedness, her orthostatic blood pressure was checked this morning which was positive but she was asymptomatic.  Her potassium improved.  Review of Systems:   I reviewed the following systems:  GI: Good appetite.  No nausea or vomiting or diarrhea.   Neuro: No  confusion  Constitutional: No fever or chills  CV: No dyspnea or edema.  No chest pain.    Physical Exam:   I/O last 3 completed shifts:  In: 1208.33 [P.O.:240; I.V.:968.33]  Out: 600 [Urine:600]   /46  Pulse 74  Temp 97.2  " F (36.2  C) (Oral)  Resp 16  Ht 1.676 m (5' 6\")  Wt 89.8 kg (198 lb)  SpO2 98%  BMI 31.96 kg/m2     GENERAL APPEARANCE: NAD  EYES: No scleral icterus, pupils equal  HENT: mouth without ulcers or lesions  PULM: lungs clear to auscultation,  bilaterally, equal air movement, no clubbing  CV: regular rhythm, normal rate, no rub     -JVD not elevated     -edema no  GI: soft, not tender, nondistended, bowel sounds are present  INTEGUMENT: no cyanosis, no rash  NEURO: No asterixis   Access no    Labs:   All labs reviewed by me  Electrolytes/Renal -   Recent Labs   Lab Test  01/25/18   0629  01/25/18   0228  01/24/18   2149   01/24/18   1252  01/24/18   1054  12/13/17   1314  11/13/17   0715  11/11/17   0722   NA  144   --    --    --   142  141  140  144  145*   POTASSIUM  5.3  5.2  5.4*   < >  6.8*  6.4*  4.0  4.2  4.1   CHLORIDE  116*   --    --    --   116*  112*  108  112*  113*   CO2  20   --    --    --   20  22  26  25  24   BUN  59*   --    --    --   68*  68*  64*  32*  32*   CR  4.16*   --    --    --   4.38*  4.53*  3.74*  3.34*  3.26*   GLC  79   --    --    --   74  84  109*  92  135*   JODY  9.0   --    --    --   9.2  9.0  8.7  8.4*  8.6   MAG   --    --    --    --   2.4*   --    --   2.0  2.0   PHOS   --    --    --    --   4.7*  4.9*  4.2  4.6*  4.6*    < > = values in this interval not displayed.       CBC -   Recent Labs   Lab Test  01/25/18   0629  01/24/18   1859  01/24/18   1054  01/09/18   1036   11/13/17   0715   WBC  4.7   --    --   5.5   --   6.2   HGB  8.2*   --   9.0*  9.1*   < >  9.7*   PLT  230  228   --   311   --   329    < > = values in this interval not displayed.       LFTs -   Recent Labs   Lab Test  01/24/18   1054  12/13/17   1314  11/13/17   0715  11/07/17   0825  11/01/17   1414   ALKPHOS   --    --   70  75  79   BILITOTAL   --    --   0.3  0.3  0.3   ALT   --    --   37  18  15   AST   --    --   30  14  7   PROTTOTAL   --    --   7.1  7.4  7.2   ALBUMIN  2.6*  2.5*  1.8*  " 1.7*  1.9*       Iron Panel -   Recent Labs   Lab Test  11/05/17   0903  10/26/16   1211  09/01/15   1632   IRON  25*  38  37   IRONSAT  10*  15  11*   ELENA  117  44  29         Imaging:  All imaging studies reviewed by me.     Current Medications:    acetaminophen  650 mg Oral 4x Daily     amLODIPine  5 mg Oral Daily     atorvastatin  20 mg Oral Daily     calcitRIOL  0.25 mcg Oral Daily     carvedilol  25 mg Oral BID w/meals     DULoxetine  30 mg Oral BID     furosemide  40 mg Oral BID     hydrALAZINE  50 mg Oral BID     Urea   Topical Daily     sodium chloride (PF)  3 mL Intracatheter Q8H     heparin  5,000 Units Subcutaneous Q12H     nystatin   Topical BID     insulin aspart  4 Units Subcutaneous TID w/meals     insulin glargine  10 Units Subcutaneous At Bedtime       Layne Kenny MD

## 2018-01-25 NOTE — PROGRESS NOTES
INTERNAL MEDICINE PROGRESS NOTE    Supriya Herr ([id number]) admitted on 1/24/2018 01/25/2018    Assessment & Plan: Supriya Herr is a 68 year old female with history of DM II, CKD, HTN, chronic anemia, and prior left traumatic AKA who presented to clinic for routine check and was found to have hyperkalemia with mild EKG changes s/p shifting, lasix, and kayexalate.     #Hyperkalemia  #ESRD  Her acute hyperkalemia is likely related to her underlying and progressing CKD and lisinopril and spironolactone. Potassium has improved with lasix, IVF, and kayexalate, however still slightly elevated. Continuing with fluids (orthostatic today), lasix, and additional dose of kayexalate with q6h potassium checks. Will continue to hold lisinopril and spironolactone. At this point, difficult to tell if rise in creatinine is simply progression of CKD vs LORI on CKD, nephrology favors the former.  -s/p calcium in ED  -q4h -> q6h potassium  -shift prn  -hold lisinopril and spironolactone  -continue lasix with IV fluids  -additional dose of kayexalate  -nephrology following - appreciate assistance  -UA - unremarkable     #HTN  Medication regimen has been evolving recently with concern for hypotension. Will continue home regimen for now, save for lisinopril as above, and adjust based on BP obtained while admitted. At this point BP has been stable.  -amlodipine 10 mg daily  -hydralazine 50 mg BID  -coreg 25 mg BID  -lasix 40 mg BID    #HFpEF  Giving gentle fluids as above, will monitor for hypoxemia, holding ACEI and spironolactone as above.     #DM II  Most recent A1C shows patient at goal. Has neuropathy, will continue home medications.  -novolog and glargine per home regimens  -duloxetine 30 mg BID     #Candidiasis  Beefy rash under breast and in groin, looks like yeast infection.  -topical anti-fungal     FEN: renal  Prophylaxis:  DVT: heparin  Disposition: pending improvement in potassium  Code Status: FULL CODE     Nicola  "[facility]  Internal Medicine PGY2     Patient was seen and discussed with attending physician Dr. RAMSEY.  ==================================================================  Subjective:  No acute events overnight. Knee pain has improved, eating and drinking without difficulty. No fevers or chills. Urinating without issue. Voices no complaints this morning.    ROS: negative other than listed above    Objective:  Most recent vital signs:  /46  Pulse 74  Temp 97.2  F (36.2  C) (Oral)  Resp 16  Ht 1.676 m (5' 6\")  Wt 89.8 kg (198 lb)  SpO2 98%  BMI 31.96 kg/m2  Temp:  [97.2  F (36.2  C)-97.3  F (36.3  C)] 97.2  F (36.2  C)  Pulse:  [74-82] 74  Heart Rate:  [64-76] 76  Resp:  [12-16] 16  BP: (127-156)/(42-66) 148/46  SpO2:  [96 %-99 %] 98 %  Wt Readings from Last 2 Encounters:   01/24/18 89.8 kg (198 lb)   01/09/18 90 kg (198 lb 8 oz)     Intake/Output Summary (Last 24 hours) at 01/25/18 1333  Last data filed at 01/25/18 0400   Gross per 24 hour   Intake          1208.33 ml   Output              600 ml   Net           608.33 ml     Physical exam:  General: Patient lying comfortably in bed, NAD, speaking in full sentences  HEENT: EOMI, no scleral icterus or injection, no JVD  Cardiac: RRR, no m/r/g appreciated.   Respiratory: CTAB, no wheezes, rhonchi or crackles appreciated.  GI: NABS, NT/ND, no guarding or rebound  Extremities: No LE edema, left AKA   Skin: No acute lesions appreciated  Neuro: AOx3, CN II-XII grossly intact, fluent speech, no apparent focal deficits    Results for BROOKE ARANA (MRN [id number]) as of 1/25/2018 13:17   Ref. Range 1/25/2018 06:29   Sodium Latest Ref Range: 133 - 144 mmol/L 144   Potassium Latest Ref Range: 3.4 - 5.3 mmol/L 5.3   Chloride Latest Ref Range: 94 - 109 mmol/L 116 (H)   Carbon Dioxide Latest Ref Range: 20 - 32 mmol/L 20   Urea Nitrogen Latest Ref Range: 7 - 30 mg/dL 59 (H)   Creatinine Latest Ref Range: 0.52 - 1.04 mg/dL 4.16 (H)   GFR Estimate Latest Ref Range: " >60 mL/min/1.7m2 11 (L)   GFR Estimate If Black Latest Ref Range: >60 mL/min/1.7m2 13 (L)   Calcium Latest Ref Range: 8.5 - 10.1 mg/dL 9.0   Anion Gap Latest Ref Range: 3 - 14 mmol/L 8   Glucose Latest Ref Range: 70 - 99 mg/dL 79   WBC Latest Ref Range: 4.0 - 11.0 10e9/L 4.7   Hemoglobin Latest Ref Range: 11.7 - 15.7 g/dL 8.2 (L)   Hematocrit Latest Ref Range: 35.0 - 47.0 % 26.9 (L)   Platelet Count Latest Ref Range: 150 - 450 10e9/L 230   RBC Count Latest Ref Range: 3.8 - 5.2 10e12/L 2.93 (L)   MCV Latest Ref Range: 78 - 100 fl 92   MCH Latest Ref Range: 26.5 - 33.0 pg 28.0   MCHC Latest Ref Range: 31.5 - 36.5 g/dL 30.5 (L)   RDW Latest Ref Range: 10.0 - 15.0 % 15.3 (H)   INR Latest Ref Range: 0.86 - 1.14  1.06

## 2018-01-25 NOTE — PLAN OF CARE
Problem: Patient Care Overview  Goal: Plan of Care/Patient Progress Review  Outcome: No Change  Pt was admitted from [facility] after routine check found hyperkalemia and EKG changes. Admitted for monitoring of potassium level and telemetry monitoring. Telemetry is NSR. Pt has history of DM II, CKD, HTN, chronic anemia, and prior left traumatic AKA.  Pt is A&Ox4, able to make needs known. Denies pain. Pt is wheelchair bound at baseline. Able to pivot stand with RLE. Pt is having serial K+ checks. Latest was 5.2. Pt has R PIV saline locked after 1 Liter of fluids. UA was sent. Preliminary report in. Nephrology consulted for management of hyperkalemia and worsening creatinine. Pt is resting appropriately. Will continue to assess per POC.

## 2018-01-25 NOTE — PROGRESS NOTES
"[facility] Nurse Inpatient Wound Assessment    Initial Assessment  Reason for consultation: Evaluate and treat  cleft and pannus/breasts wound      ASSESSMENT:    cleft wound due to Moisture Associated Skin Damage (MASD) and Intertrigo  Status: initial assessment    Pannus and Breasts wounds due to fungal infection and intertrigo  Status: initial assessment  TREATMENT PLAN:     Rachna cleft: every third day cleanse with microklenz and pat dry.  Apply mepilex border.      Pannus and under breasts:   Orders Written Cleanse the area and dry thoroughly. Cut a piece of interdry (#760797) and place it as a single layer and date it. Ensure to leave at least 2\" of tail beyond the fold to promote moisture wicking. May leave same interdry for 5 days if not soiled.    Recommend: switching nystatin ointment (which adds moisture) to powder to aid in drying out the area.     [facility] Nurse follow-up plan: weekly  Nursing to notify the Provider(s) and re-consult the [facility] Nurse if wound(s) deteriorates or new skin concern.    Patient History  According to provider note(s): Supriya Herr is a 68 year old female with history of DM II, CKD, HTN, chronic anemia, and prior left traumatic AKA who presented to clinic for routine check and was found to have hyperkalemia with mild EKG changes being admitted for management.    Objective Data   Containment of urine/stool: Continent of bowel and Continent of bladder    Active Diet Order    Active Diet Order      Combination Diet Renal Diet    Output:  I/O last 3 completed shifts:  In: 1208.33 [P.O.:240; I.V.:968.33]  Out: 600 [Urine:600]    Risk Assessment:   Ben Ben Score  Av  Min: 17  Max: 17                                 Labs:     Recent Labs  Lab 18  0629   HGB 8.2*   INR 1.06   WBC 4.7     No lab results found in last 7 days.        PHYSICAL EXAM:   Skin assessment:  cleft, pannus and breasts    Wound Location:   cleft  Wound History: present on admit, noted " scar from previous wound now well healed.    Measurements (length x width x depth, in cm) 3 x 0.2 x <0.1 cm    Wound Base:  100% epidermis  Palpation of the wound bed: normal   Periwound skin: macerated  Color: normal and consistent with surrounding tissue  Temperature: normal   Drainage: scant  Description of drainage: serous  Odor: none  Pain: score 2/10 with palpation, aching    Wound Location:  Pannus and under breasts.  Known fungal infection.  Nystatin cream ordered by MD for fungal infection.         INTERVENTIONS:   Current support surface: Standard  Atmos Air mattress   Current off-loading measures: Pillows under calves  Visual inspection of wounds(s) completed.  Wound Care: was not done per plan of care.  RN to complete when next OOB  Supplies: mepilex  Education provided today: turning.      Discussed plan of care with Patient, Family and Nurse  Face to face time: 20 minutes

## 2018-01-26 VITALS
HEIGHT: 66 IN | SYSTOLIC BLOOD PRESSURE: 118 MMHG | OXYGEN SATURATION: 97 % | HEART RATE: 70 BPM | DIASTOLIC BLOOD PRESSURE: 28 MMHG | BODY MASS INDEX: 32.14 KG/M2 | WEIGHT: 199.96 LBS | TEMPERATURE: 97.1 F | RESPIRATION RATE: 16 BRPM

## 2018-01-26 LAB
ANION GAP SERPL CALCULATED.3IONS-SCNC: 8 MMOL/L (ref 3–14)
BUN SERPL-MCNC: 55 MG/DL (ref 7–30)
CALCIUM SERPL-MCNC: 8.6 MG/DL (ref 8.5–10.1)
CHLORIDE SERPL-SCNC: 115 MMOL/L (ref 94–109)
CO2 SERPL-SCNC: 22 MMOL/L (ref 20–32)
CREAT SERPL-MCNC: 4.03 MG/DL (ref 0.52–1.04)
ERYTHROCYTE [DISTWIDTH] IN BLOOD BY AUTOMATED COUNT: 15.2 % (ref 10–15)
GFR SERPL CREATININE-BSD FRML MDRD: 11 ML/MIN/1.7M2
GLUCOSE BLDC GLUCOMTR-MCNC: 107 MG/DL (ref 70–99)
GLUCOSE BLDC GLUCOMTR-MCNC: 116 MG/DL (ref 70–99)
GLUCOSE BLDC GLUCOMTR-MCNC: 199 MG/DL (ref 70–99)
GLUCOSE SERPL-MCNC: 100 MG/DL (ref 70–99)
HCT VFR BLD AUTO: 28 % (ref 35–47)
HGB BLD-MCNC: 8.6 G/DL (ref 11.7–15.7)
MCH RBC QN AUTO: 28.4 PG (ref 26.5–33)
MCHC RBC AUTO-ENTMCNC: 30.7 G/DL (ref 31.5–36.5)
MCV RBC AUTO: 92 FL (ref 78–100)
PLATELET # BLD AUTO: 234 10E9/L (ref 150–450)
POTASSIUM SERPL-SCNC: 4.6 MMOL/L (ref 3.4–5.3)
POTASSIUM SERPL-SCNC: 5 MMOL/L (ref 3.4–5.3)
RBC # BLD AUTO: 3.03 10E12/L (ref 3.8–5.2)
SODIUM SERPL-SCNC: 145 MMOL/L (ref 133–144)
WBC # BLD AUTO: 4.4 10E9/L (ref 4–11)

## 2018-01-26 PROCEDURE — 40000556 ZZH STATISTIC PERIPHERAL IV START W US GUIDANCE

## 2018-01-26 PROCEDURE — 25000132 ZZH RX MED GY IP 250 OP 250 PS 637: Mod: GY | Performed by: STUDENT IN AN ORGANIZED HEALTH CARE EDUCATION/TRAINING PROGRAM

## 2018-01-26 PROCEDURE — A9270 NON-COVERED ITEM OR SERVICE: HCPCS | Mod: GY | Performed by: STUDENT IN AN ORGANIZED HEALTH CARE EDUCATION/TRAINING PROGRAM

## 2018-01-26 PROCEDURE — 00000146 ZZHCL STATISTIC GLUCOSE BY METER IP

## 2018-01-26 PROCEDURE — 99238 HOSP IP/OBS DSCHRG MGMT 30/<: CPT | Mod: GC | Performed by: INTERNAL MEDICINE

## 2018-01-26 PROCEDURE — 80048 BASIC METABOLIC PNL TOTAL CA: CPT | Performed by: STUDENT IN AN ORGANIZED HEALTH CARE EDUCATION/TRAINING PROGRAM

## 2018-01-26 PROCEDURE — 85027 COMPLETE CBC AUTOMATED: CPT | Performed by: STUDENT IN AN ORGANIZED HEALTH CARE EDUCATION/TRAINING PROGRAM

## 2018-01-26 PROCEDURE — 84132 ASSAY OF SERUM POTASSIUM: CPT | Performed by: STUDENT IN AN ORGANIZED HEALTH CARE EDUCATION/TRAINING PROGRAM

## 2018-01-26 PROCEDURE — 36415 COLL VENOUS BLD VENIPUNCTURE: CPT | Performed by: STUDENT IN AN ORGANIZED HEALTH CARE EDUCATION/TRAINING PROGRAM

## 2018-01-26 RX ADMIN — Medication: at 08:53

## 2018-01-26 RX ADMIN — ATORVASTATIN CALCIUM 20 MG: 20 TABLET, FILM COATED ORAL at 08:48

## 2018-01-26 RX ADMIN — FUROSEMIDE 40 MG: 40 TABLET ORAL at 08:48

## 2018-01-26 RX ADMIN — CARVEDILOL 25 MG: 25 TABLET, FILM COATED ORAL at 08:48

## 2018-01-26 RX ADMIN — INSULIN ASPART 4 UNITS: 100 INJECTION, SOLUTION INTRAVENOUS; SUBCUTANEOUS at 08:56

## 2018-01-26 RX ADMIN — AMLODIPINE BESYLATE 5 MG: 5 TABLET ORAL at 08:48

## 2018-01-26 RX ADMIN — CALCITRIOL 0.25 MCG: 0.25 CAPSULE, LIQUID FILLED ORAL at 08:51

## 2018-01-26 RX ADMIN — INSULIN ASPART 4 UNITS: 100 INJECTION, SOLUTION INTRAVENOUS; SUBCUTANEOUS at 12:37

## 2018-01-26 RX ADMIN — HYDRALAZINE HYDROCHLORIDE 50 MG: 50 TABLET ORAL at 08:48

## 2018-01-26 RX ADMIN — DULOXETINE HYDROCHLORIDE 30 MG: 30 CAPSULE, DELAYED RELEASE ORAL at 08:48

## 2018-01-26 NOTE — PROGRESS NOTES
Care Coordinator- Discharge Planning     Admission Date/Time:  1/24/2018  Attending MD:  Kirill Black MD     Data  Date of initial CC assessment:  1/26/2018  Chart reviewed, discussed with interdisciplinary team.   Patient was admitted for:   1. Hyperkalemia    2. ESRD (end stage renal disease) on dialysis (H)      Data  Concerns with insurance coverage for discharge needs: None.  Current Living Situation: Pt is wheelchair bound at baseline. Able to pivot stand with RLE. Uses wheelchair for transfer.   Pt lives in a duplex on the bottom floor, her daughter and SO live on the top floor with 5 y/o grandson.   A ramp is present in front of the home and all needs can be met on patients main level.   Support System: Supportive and Involved  Services Involved: PCA (daughter is PCA)  Transportation: daughter provides transport (If patients wheelchair is at home, daughter brings at d/c)  Barriers to Discharge: none    Coordination of Care  Patient was discharged (met goals) from [facility] on 1/4/2018    Referrals: Provided patient/family with options for none needed this admission.    Assessment  D: Chart reviewed and plan of care discussed with MD team. Patient admitted for history of DM II, CKD, HTN, chronic anemia, and prior left traumatic AKA who presented to clinic for routine check and was found to have hyperkalemia with mild EKG changes s/p shifting, lasix, and kayexalate; hyperkalemia is now resolved. Plan for patient to discharge home today. I/A: No discharge needs identified as mobility is at baseline. Patients daughter continues to provide PCA cares at home. P: RN Care Coordinator will remain available for discharge needs that may arise.         Plan  Anticipated Discharge Date:  1/26/2018  Anticipated Discharge Plan:  Home with daughter/PCA    CTS Handoff completed:  YES    Hellen DE LEÓNN RN PHN  Patient Care Management Coordinator  [address] [place] [address] [facility] [address]  Phone: [phone] / Pager:  [phone]

## 2018-01-26 NOTE — PLAN OF CARE
"Problem: Patient Care Overview  Goal: Plan of Care/Patient Progress Review  Outcome: No Change  Blood pressure 122/43, pulse 76, temperature 97.1  F (36.2  C), temperature source Oral, resp. rate 16, height 1.676 m (5' 6\"), weight 89.8 kg (198 lb), SpO2 96 %, not currently breastfeeding.    Pt was admitted from [facility] after routine check found hyperkalemia and EKG changes. Admitted for monitoring of potassium level and telemetry monitoring. Telemetry is NSR. Pt has history of DM II, CKD, HTN, chronic anemia, and prior left traumatic AKA.  Pt is A&Ox4, able to make needs known. Denies pain. Pt is wheelchair bound at baseline. Able to pivot stand with RLE. Uses wheelchair for transfer to bathroom. Voiding adequate amounts this shift. Pt is having serial K+ checks. Latest was 4.6. Pt has PIV saline locked after 1 Liter of fluids. Nephrology consulted for management of hyperkalemia and worsening creatinine. Pt is resting appropriately. Blood glucose was stable overnight. Will continue to assess per POC.      "

## 2018-01-26 NOTE — PROGRESS NOTES
Nephrology Consult Daily Note  01/26/2018          Medical Student Note Fellow *** or Attending Note   Assessment and Recommendation (Student)    Assessment:  Supriya Herr is a 68 year old female with history of CKD stage 4 presenting for hyperkalemia and elevated creatine.     Plan:  1. Hyperkalemia: Potassium initially 6.8, now improved to 5.0. Likely etiology is CKD with use of both spironolactone and lisinopril. Managed with shifting, cardiac stabilization, increased secretion, IV fluid replacement, and lasix. Kayexalate also given.   -- Ok to discharge, follow up appointment scheduled with Dr. Basilio next week   -- Cardiac monitoring with inpatient   -- Decreased lasix to 40 mg PO BID upon discharge   -- Hold spironolactone and lisinopril   -- Start veltassa until nephrology f/u next week      2. Blood pressure and volume: BP has been stable with positive orthostatics on the morning of 1/25, additional fluids given at that time with improvement of symptoms. History of hypertension managed with multiple medications prior to admission. Appears euvolemic on exam.   -- Hold lisinopril in the setting of hyperkalemia  -- Continue home medications including amlodipine, carvedilol, lasix, and hydralazine    3. Anemia: Chronic iron deficiency anemia prior to admission managed with iron supplements. Stable during hospitalization.       Assessment and  Recommendation (Physician)     Haven Romo 1/26/2018 2:20 PM I have communicated the plan with the primary team via ***    Attestation:  This patient has been seen and evaluated by me on *** .     I have reviewed Medications, Vital Signs and Labs.      ***, MD  Nephrology Attending  [facility]  Division of Renal Disease and Hypertension          Medical Student Interval History Fellow *** or Attending Interval History   Medical student: Interval History  Overnight Supriya has continued to be asymptomatic and feels well. Hopeful for discharge today.  "    Review of Systems  Negative fever/chills, dizziness, abdominal pain, nausea/vomiting, shortness of breath In the last 24 hours Supriya Herr 's *** lab value has gone from *** to *** after the intervention of ***. Other notable treatments that impacted the *** include ***     *** dyspnea or edema.  *** chest pain.      Review of Systems:   A 4 point review of systems was negative except as noted above.   notably: *** appetite. *** nausea or vomiting or diarrhea.  *** confusion *** fever or chills      Physical Exam (Student)  Vitals were reviewed  Blood pressure (!) 118/28, pulse 70, temperature 97.1  F (36.2  C), temperature source Oral, resp. rate 16, height 1.676 m (5' 6\"), weight 90.7 kg (199 lb 15.3 oz), SpO2 97 %, not currently breastfeeding.    Constitutional:   awake, alert, cooperative, no apparent distress     Lungs:   No increased work of breathing, good air exchange, clear to auscultation bilaterally, no crackles or wheezing     Cardiovascular:   regular rate and rhythm, normal S1 and S2, no murmur noted     Abdomen:   soft, non-distended, non-tender     Extremities:   Left AKA. Right lower extremity without edema.      Intake/Output Summary (Last 24 hours) at 01/26/18 1414  Last data filed at 01/26/18 0241   Gross per 24 hour   Intake             1115 ml   Output              600 ml   Net              515 ml            Physical Exam (Physician)  Vitals were reviewed  BP (!) 118/28 (BP Location: Right arm)  Pulse 70  Temp 97.1  F (36.2  C) (Oral)  Resp 16  Ht 1.676 m (5' 6\")  Wt 90.7 kg (199 lb 15.3 oz)  SpO2 97%  BMI 32.27 kg/m2     Intake/Output Summary (Last 24 hours) at 01/26/18 1414  Last data filed at 01/26/18 0241   Gross per 24 hour   Intake             1115 ml   Output              600 ml   Net              515 ml        {BRIEF exam - 4 systems required:314829}     Labs:   The following labs were reviewed:   CMP  Recent Labs  Lab 01/26/18  0805 01/26/18  0024 01/25/18  1810 " 01/25/18  1202 01/25/18  0629  01/24/18  1252 01/24/18  1054   *  --   --   --  144  --  142 141   POTASSIUM 5.0 4.6 5.2 5.7* 5.3  < > 6.8* 6.4*   CHLORIDE 115*  --   --   --  116*  --  116* 112*   CO2 22  --   --   --  20  --  20 22   ANIONGAP 8  --   --   --  8  --  6 7   *  --   --   --  79  --  74 84   BUN 55*  --   --   --  59*  --  68* 68*   CR 4.03*  --   --   --  4.16*  --  4.38* 4.53*   GFRESTIMATED 11*  --   --   --  11*  --  10* 10*   GFRESTBLACK 13*  --   --   --  13*  --  12* 12*   JODY 8.6  --   --   --  9.0  --  9.2 9.0   MAG  --   --   --   --   --   --  2.4*  --    PHOS  --   --   --   --   --   --  4.7* 4.9*   ALBUMIN  --   --   --   --   --   --   --  2.6*   < > = values in this interval not displayed.  CBC  Recent Labs  Lab 01/26/18  0805 01/25/18  0629 01/24/18  1859 01/24/18  1054   HGB 8.6* 8.2*  --  9.0*   WBC 4.4 4.7  --   --    RBC 3.03* 2.93*  --   --    HCT 28.0* 26.9*  --   --    MCV 92 92  --   --    MCH 28.4 28.0  --   --    MCHC 30.7* 30.5*  --   --    RDW 15.2* 15.3*  --   --     230 228  --      INR  Recent Labs  Lab 01/25/18  0629   INR 1.06     ABGNo lab results found in last 7 days.   URINE STUDIES  Recent Labs   Lab Test  01/25/18   0233  11/02/17   0602  10/26/16   1220  06/11/15   1352  12/05/12   1541   COLOR  Light Yellow  Light Yellow  Yellow  Light Yellow  Yellow   APPEARANCE  Clear  Clear  Slightly Cloudy  Slightly Cloudy  Clear   URINEGLC  Negative  300*  >500*  30*  100*   URINEBILI  Negative  Negative  Negative  Negative  Negative   URINEKETONE  Negative  Negative  Negative  Negative  Negative   SG  1.007  1.010  1.014  1.009  1.025   UBLD  Negative  Negative  Negative  Trace*  Small*   URINEPH  6.5  7.5*  6.0  5.5  6.0   PROTEIN  100*  >600*  >500*  100*  100*   UROBILINOGEN   --    --    --    --   0.2   NITRITE  Negative  Negative  Negative  Negative  Negative   LEUKEST  Negative  Small*  Small*  Large*  Negative   RBCU  0  1  5*  5*   --    WBCU   1  44*  54*  >182*   --      Recent Labs   Lab Test  12/13/17   1330  03/22/17   0815  02/14/17   1435  10/26/16   1220  07/14/16   1405  12/10/15   1445  09/01/15   1644  06/11/15   1352  01/05/10   1004   UTPG  8.32*  13.09*  10.72*  22.13*  9.07*  6.35*  6.54*  8.04*  2.61*     PTH  Recent Labs   Lab Test  12/13/17   1314  10/26/16   1211  09/01/15   1632   PTHI  98*  66  97*     IRON STUDIES  Recent Labs   Lab Test  11/05/17   0903  10/26/16   1211  09/01/15   1632  06/20/14   1506 01/21/14   IRON  25*  38  37  25*  24   FEB  256  249  335  370   --    IRONSAT  10*  15  11*  7*   --    ELENA  117  44  29  14  12     Imaging:  Results for orders placed or performed during the hospital encounter of 11/01/17   US Lower Extremity Venous Duplex Right    Narrative    US LOWER EXTREMITY VENOUS DUPLEX RIGHT11/1/2017 3:24 PM    HISTORY: Pain and swelling, evaluate for DVT.    TECHNIQUE:  Venous Doppler US.? Color flow and spectral Doppler with  waveform analysis performed.    FINDINGS: There is edema or thin ill-defined fluid at the lateral  aspect of the right thigh. The peroneal veins are not well visualized.  No evidence of lower extremity deep venous thrombosis.       Impression    IMPRESSION: No DVT identified.    DAPHNEY STEVEN MD   CT Hip Right w/o Contrast    Narrative    CT HIP RIGHT WITHOUT CONTRAST 11/1/2017 5:02 PM     COMPARISON: None.    HISTORY: Rapidly spreading cellulitis of right lower extremity in the  thigh.  Evaluate for necrotizing fasciitis.    TECHNIQUE: Thin-section axial non-contrast CT images of the right  thigh were acquired. Multiplanar reconstructions were created.    FINDINGS: There are ORIF changes to the right acetabulum. There are  severe degenerative changes of the right hip joint. There are no  fractures of the visualized bones. No aggressive bony lesions noted.    There is increased density in the subcutaneous fat of the posterior  and lateral right thigh extending from the level of the  greater  trochanter down to the inferior most image. These findings are  consistent with cellulitis. There is no evidence for fluid collection  or abscess anywhere in the right lower extremity. There is no  subcutaneous gas. There is no evidence for fluid or thickening within  the fascia around the muscular compartments. There is no evidence for  fluid or inflammation within the muscular compartments.      Impression    IMPRESSION: Findings consistent with cellulitis of the right thigh. No  findings to suggest fasciitis. No fractures.      Radiation dose for this scan was reduced using automated exposure  control, adjustment of the mA and/or kV according to patient size, or  iterative reconstruction technique    POORNIMA BOLAÑOS MD   US Lower Extremity Venous Duplex Right Port    Narrative    EXAMINATION: DOPPLER VENOUS ULTRASOUND OF THE RIGHT LOWER EXTREMITY,  11/6/2017 1:25 AM     COMPARISON: 1/1/2017    HISTORY: Right leg swelling    TECHNIQUE:  Gray-scale evaluation with compression, spectral flow, and  color Doppler assessment of the deep venous system of the right leg  from groin to knee, and then at the ankle.    FINDINGS:  In the right lower extremity, the common femoral, femoral, popliteal  and posterior tibial veins demonstrate normal compressibility and  blood flow. The left common femoral vein imaged has a comparison  demonstrates normal compressibility and blood flow.      Impression    IMPRESSION:  No evidence of right lower extremity deep venous thrombosis.    I have personally reviewed the examination and initial interpretation  and I agree with the findings.    TOMY JIMENEZ MD   CT Femur Right w/o Contrast    Narrative    CT right thigh without contrast    History: Worsening right thigh swelling, redness and pain despite  antibiotics for cellulitis. Concern for fluid collection.    Techniques: Helical acquisition of images through the right thigh was  obtained without administration of  contrast.    DLP: 1596 mGy-cm    Comparison: CT November 1, 2017.    Findings:    There is subcutaneous soft tissue stranding involving the superficial  fascial layer extending down to level deep fascial layer. Overall  these findings have progressed since comparison study. There is new  area of fluid attenuation involving the right anterior thigh  compartment along the lateral aspect of the vastus lateralis muscle,  approximately 12 mm in thickness, located 29 cm proximal to the knee  joint for the craniocaudal length of 18 cm (image 54 series 5). There  is also area of new subfascial fluid posteriorly (image 176 series 2  for instance). No subcutaneous emphysema.    Degenerative changes of the knee. Partial visualization of the  surgical hardware in the right iliac bone extending to the acetabulum.  No evidence of fracture, suspicious osteolysis.    There is severe degenerative change of right hip with extensive bony  proliferative change. There is suspect a small hip joint effusion,  likely degenerative. Internal judgment of joints are not well assessed  with CT technique. Small knee joint effusion is present.    Vascular calcifications. There are multiple right inguinal, external  iliac chain lymphadenopathy, similar to prior and likely reactive.  Pelvic phlebolith.      Impression    Impression:  1. Overall progression of soft tissue infection in the right thigh,  which now has area of fluid attenuation deep to the deep fascial layer  along the lateral margin of vastus lateralis muscle (12 mm thickness,  18 cm in craniocaudal dimension) as well as new area of subfascial  fluid posteriorly. Primary differential in the provided clinical  setting is either necrotizing or non-necrotizing soft tissue infection  including fasciitis.    Findings were communicated to Dr. Carrasco by Dr. Roman at 7:44 PM.    KIMBERLEE ROMAN    Renal Complete    Narrative    EXAMINATION: Renal ultrasound, 11/6/2017 12:51 AM      COMPARISON: 10/13/2016    HISTORY: Renal failure    FINDINGS:    Right kidney: Measures 13.1 cm in length. Mildly thinned, echogenic  renal cortex. Unchanged 1.2 cm cortical cyst. No focal mass. No  hydronephrosis.    Left kidney: Measures 13.1 cm in length. Mildly thin, echogenic renal  cortex. No focal mass. No hydronephrosis. 1.5 cm cortical cyst.    Bladder: Unremarkable.      Impression    IMPRESSION:  1. Thin, echogenic renal cortices consistent with medical renal  disease.  2. No hydronephrosis.    I have personally reviewed the examination and initial interpretation  and I agree with the findings.    TOMY JIMENEZ MD   US Extremity Non Vascular Right    Narrative    Exam: Soft tissue ultrasound, 11/6/2017 1:25 AM    Indication: Evaluate for fluid collection    Comparison: CT on 11/5/2017    Findings:   Soft tissue ultrasound of the right thigh. Marked edema along the  lateral thigh. No definite drainable fluid collection. The mass.      Impression    Impression: Marked edema along the right lateral thigh without  appreciable drainable fluid collection.    I have personally reviewed the examination and initial interpretation  and I agree with the findings.    AUGUSTUS FAN MD     *Note: Due to a large number of results and/or encounters for the requested time period, some results have not been displayed. A complete set of results can be found in Results Review.          Current Medications:    acetaminophen  650 mg Oral 4x Daily     amLODIPine  5 mg Oral Daily     atorvastatin  20 mg Oral Daily     calcitRIOL  0.25 mcg Oral Daily     carvedilol  25 mg Oral BID w/meals     DULoxetine  30 mg Oral BID     furosemide  40 mg Oral BID     hydrALAZINE  50 mg Oral BID     Urea   Topical Daily     sodium chloride (PF)  3 mL Intracatheter Q8H     heparin  5,000 Units Subcutaneous Q12H     nystatin   Topical BID     insulin aspart  4 Units Subcutaneous TID w/meals     insulin glargine  10 Units Subcutaneous At  Bedtime       Haven Romo

## 2018-01-26 NOTE — PLAN OF CARE
Problem: Goal/Outcome  Goal: Goal Outcome Summary  Outcome: No Change  A&Ox4, VSS on RA. Right PIV infusing NS at 100 ml/hr. Kayexalate given on day shift, having frequent BM's. Lasix given, voiding often. Potassium trending down. Denies pain. Nystatin cream applied under breasts, panus and groin, interdry cloth in place. Mepilex applied to left  cleft. Up to bathroom with A1 and wheelchair. Tele, NSR. Calls to make needs known. Continue  Monitoring and following POC.

## 2018-01-26 NOTE — PLAN OF CARE
Problem: Patient Care Overview  Goal: Plan of Care/Patient Progress Review  Outcome: Adequate for Discharge Date Met: 01/26/18  Pt adequate for discharge, daughter who lives with pt here for discharge teaching. Meds reviewed, appts reviewed. PIV removed by prior RN. All questions and concerns answered, unit # given to pt and family if any further questions come up. Pt left unit with all possessions and in stable condition and went home with daughter.

## 2018-01-26 NOTE — PLAN OF CARE
Problem: Patient Care Overview  Goal: Plan of Care/Patient Progress Review  Outcome: Improving  Pt AO. VSS on RA. Denies pain, nausea, or SOB. Tolerating PO. Voiding. No BM. Potassium stable. Plan d/c home today. Daughter will provide transport home.

## 2018-01-27 NOTE — DISCHARGE SUMMARY
Internal Medicine Discharge Summary  Supriya Herr MRN: [id number]  : 1950  Primary care provider: Noam Jackson  ___________________________________          Date of Admission:  2018  Date of Discharge:  2018  Admitting Physician:  Kirill Black MD  Discharge Physician:  Kirill Black MD  Discharging Service:  [place] [address]     Primary Provider: Noam Jackson         Outpatient Follow Up:   1. BMP check early next week  2. Follow up with PCP or nephrology to review lab work and hypertension medications         Reason for Admission:   Supriya Herr is a 68 year old female with history of DM II, CKD, HTN, chronic anemia, and prior left traumatic AKA who presented to clinic for routine check and was found to have hyperkalemia with mild EKG changes prompting admission. Supriya received fluids, lasix, kayexalate, and adjusted medication regimen which resulted in improvement of her hyperkalemia. Will be following up with labs and further medications adjustments as needed.         Discharge Diagnoses:   Primary diagnoses:   1. Hyperkalemia with EKG changes  POA: Y  2. Chronic kidney disease stage IV  POA: Y    Patient Active Problem List   Diagnosis     Vitiligo     Traumatic amputation of leg above knee (H)     Contact dermatitis and other eczema, due to unspecified cause     Dermatophytosis of nail     Generalized osteoarthrosis, unspecified site     Hypertension goal BP (blood pressure) < 140/90     Mild nonproliferative diabetic retinopathy (H)     Proteinuria     Stage I pressure ulcer     Hyperlipidemia LDL goal <100     Non compliance with medical treatment     Tobacco abuse     Advanced directives, counseling/discussion     Incontinence of urine     Basal cell carcinoma     Senile nuclear sclerosis     JONE (obstructive sleep apnea)     CHF (congestive heart failure) (H)     Health Care Home     Chronic kidney  disease, stage 4 (severe) (H)     Type 2 diabetes, HbA1C goal < 8% (H)     Type 2 diabetes mellitus with diabetic chronic kidney disease (H)     Morbid obesity (H)     Type 2 diabetes mellitus with stage 3 chronic kidney disease, without long-term current use of insulin (H)     Moderate recurrent major depression (H)     Type 2 diabetes mellitus with diabetic neuropathy, with long-term current use of insulin (H)     Cellulitis     Macular cyst, hole, or pseudohole of retina     Cellulitis of right leg     Anemia of chronic renal failure, stage 4 (severe) (H)     Hyperkalemia          Procedures & Significant Findings:   Procedures:   No procedures    Imaging:   No results found.         Consultations:   Nephrology         Hospital Course by Problem:    #Hyperkalemia  #ESRD  Her acute hyperkalemia is likely related to her underlying and progressing CKD coupled with lisinopril and spironolactone. Potassium improved with lasix, IVF, and kayexalate. There was evidence of dehydration (orthostasis) which prompted IV fluids (also to aid potassium removal). Will continue to hold lisinopril and spironolactone at discharge. At this point, difficult to tell if rise in creatinine is simply progression of CKD vs LORI on CKD, nephrology favors the former.  -discontinue lisinopril and spironolactone at discharge  -low potassium diet  -BMP early next week      #HTN  Medication regimen has been evolving recently with concern for hypotension. We continued home regimen while inpatient, save for lisinopril as above, and BP remained at goal and stable. At discharge will discontinue lisinopril and spironolactone due to renal function and potassium. Additionally, after discussion with nephrology, will hold hydralazine. As an outpatient, we expect blood pressure to be above goal given these changes. Would recommend increasing amlodipine and/or carvedilol if needed for ongoing control.  -amlodipine 5 mg daily  -coreg 25 mg BID  -lasix 40 mg  "BID  -discontinue hydralazine, lisinopril, and spironolactone     #HFpEF  Tolerated fluid without issue. In the future, potassium and renal function pending, would consider re introducing spironolactone given recent studies with benefit in patients with HFpEF.      #DM II  Most recent A1C shows patient at goal. Has neuropathy, will continue home medications.  -novolog and glargine per home regimens  -duloxetine 30 mg BID      #Candidiasis  Beefy rash under breast and in groin, looks like yeast infection.  -topical anti-fungal    Physical Exam on day of Discharge:  Blood pressure (!) 118/28, pulse 70, temperature 97.1  F (36.2  C), temperature source Oral, resp. rate 16, height 1.676 m (5' 6\"), weight 90.7 kg (199 lb 15.3 oz), SpO2 97 %, not currently breastfeeding.  General: pleasant female, comfortable, NAD  HEENT: sclera anicteric, mucus membranes moist  Neck: no JVD  Respiratory: clear bilaterally  Heart/CV: normal rate, regular rhythm, no m/r/g  Abdomen/GI: soft, non distended, non tender  Extremities/MSK: trace right LE edema, left AKA  Skin: no jaundice or rashes  Neuro: alert and oriented, fluent speech, no apparent focal deficits  Psych: normal mood/affect    Lines/Tubes:  none         Pending Results:     Unresulted Labs Ordered in the Past 30 Days of this Admission     No orders found from 11/25/2017 to 1/25/2018.             Discharge Medications:     Discharge Medication List as of 1/26/2018  2:30 PM      CONTINUE these medications which have NOT CHANGED    Details   furosemide (LASIX) 80 MG tablet Take 40 mg by mouth 2 times daily , Disp-90 tablet, R-3, Historical      amLODIPine (NORVASC) 5 MG tablet Take 1 tablet (5 mg) by mouth daily, Disp-30 tablet, R-1, E-Prescribe      Urea 20 % CREA cream Apply topically dailyDisp-85 g, [id number]      NOVOLOG FLEXPEN 100 UNIT/ML soln Inject 4 units SQ with breakfast, lunch and dinner., Disp-15 mL, R-3, JONATHON, Historical      !! order for DME Equipment being " ordered: Right Lower extremity Solaris Ready wrap calf piece:  Size small/length tall , knee piece: Size small , Thigh piece size small/length average.Disp-1 Units, R-0, Local Print      !! blood glucose monitoring (ONETOUCH ULTRA) test strip Test your blood sugar 3-4 times per day., Disp-200 strip, R-3, E-Prescribe      acetaminophen (TYLENOL) 325 MG tablet Take 2 tablets (650 mg) by mouth 4 times daily, Disp-100 tablet, No Print Out      insulin glargine (LANTUS) 100 UNIT/ML injection Inject 20 Units Subcutaneous At Bedtime, Disp-3 mL, R-1, E-Prescribe      lactobacillus rhamnosus, GG, (CULTURELL) capsule Take 1 capsule by mouth 2 times daily, Disp-60 capsule, R-0, Local Print      miconazole (MICATIN; MICRO GUARD) 2 % powder Apply topically 2 times dailyDisp-71 g, [id number]      !! order for DME 1 wheelchairDisp-1 Device, R-0, Local Print      tolterodine (DETROL LA) 2 MG 24 hr capsule TAKE 1 CAPSULE (2 MG) BY MOUTH DAILY, Disp-60 capsule, R-3, E-Prescribe      DULoxetine (CYMBALTA) 30 MG EC capsule Take 1 capsule (30 mg) by mouth 2 times daily, Disp-180 capsule, R-3, E-Prescribe      !! blood glucose monitoring (ONE TOUCH ULTRA) test strip Use to test blood sugars 2 times daily or as directed., Disp-200 strip, R-3, E-Prescribe      atorvastatin (LIPITOR) 20 MG tablet Take 1 tablet (20 mg) by mouth daily, Disp-90 tablet, R-3, E-Prescribe      carvedilol (COREG) 25 MG tablet Take 1 tablet (25 mg) by mouth 2 times daily (with meals), Disp-180 tablet, R-3, E-Prescribe      !! order for DME Equipment being ordered: TEDS stocking   Below the knee 15-20 mg  Dispense 2  Use dailyDisp-2 Device, R-1, Local Print      albuterol (PROAIR HFA, PROVENTIL HFA, VENTOLIN HFA) 108 (90 BASE) MCG/ACT inhaler Inhale 2 puffs into the lungs every 6 hours as needed for shortness of breath / dyspnea or wheezing, Disp-3 Inhaler, R-1, E-Prescribe      insulin pen needle (ULTICARE MINI) 31G X 6 MM Use daily or as directed.Disp-100  each, [id number]      !! ORDER FOR DME Equipment being ordered: Compression stockings, 20-30 MMHG, knee highDisp-1 Device, R-0, Fax      ASPIRIN NOT PRESCRIBED, INTENTIONAL, 1 each continuous prn Antiplatelet medication not prescribed intentionally due to current nosebleeds, Disp-0 each, R-0, No Print Out      MULTIVITAMIN OR 1 tablet by mouth daily, Historical       !! - Potential duplicate medications found. Please discuss with provider.      STOP taking these medications       calcitRIOL (ROCALTROL) 0.25 MCG capsule Comments:   Reason for Stopping:         spironolactone (ALDACTONE) 25 MG tablet Comments:   Reason for Stopping:         hydrALAZINE (APRESOLINE) 100 MG TABS tablet Comments:   Reason for Stopping:         lisinopril (PRINIVIL/ZESTRIL) 40 MG tablet Comments:   Reason for Stopping:                    Discharge Instructions and Follow-Up:     Discharge Procedure Orders  Reason for your hospital stay   Order Comments: You were admitted to the hospital due to a high potassium level in your blood that was treated with fluids and medications to get rid of excess potassium in your body. We will be stopping medications that we think caused the high potassium levels.     Adult [facility] Follow-up and recommended labs and tests   Order Comments: Follow up with kidney doctor or PCP within 1 week for blood pressure and potassium check. Would recommend BMP early next week.    Appointments on [place] and/or [facility] (with [facility] or [facility] provider or service). Call [phone] if you haven't heard regarding these appointments within 7 days of discharge.     Activity   Order Comments: Your activity upon discharge: activity as tolerated   Order Specific Question Answer Comments   Is discharge order? Yes      When to contact your care team   Order Comments: Fevers, chills, difficulty breathing, chest pain or pressure.     Discharge Instructions   Order Comments: You should stop taking the lisinopril and  spironolactone as we feel these medications were contributing to your high potassium level. You can continue the lasix, amlodipine, and carvedilol at the same doses you were prior to being in the hospital. We would also like you to stop taking hydralazine. Your blood pressure might increase with these changes, adjustments can be made outside of the hospital in clinic. You should follow up within a week with either your primary care provider or kidney doctor to recheck your potassium level and blood pressure.     Full Code     Diet   Order Comments: Follow this diet upon discharge: Orders Placed This Encounter     Combination Diet Renal Diet   Order Specific Question Answer Comments   Is discharge order? Yes              Discharge Disposition:   Home with family         Condition on Discharge:   Discharge condition: Stable   Code status on discharge: Full Code        Date of service: 1/26/2018    The patient was discussed with Dr. RAMSEY.    Nicola Panchal  Internal Medicine PGY2

## 2018-01-29 ENCOUNTER — CARE COORDINATION (OUTPATIENT)
Dept: CARE COORDINATION | Facility: CLINIC | Age: 68
End: 2018-01-29

## 2018-01-29 ENCOUNTER — CARE COORDINATION (OUTPATIENT)
Dept: NEPHROLOGY | Facility: CLINIC | Age: 68
End: 2018-01-29

## 2018-01-29 NOTE — PROGRESS NOTES
Reason for Call    Patient called post hospital discharge. Spoke to patient's Caroline werner. Patient discharged on Friday 1/26.    Medication Review    Medication review completed.  Discharge medication changes reviewed. Confirmed patient has stopped spironolactone and lisinopril due to hyperkalemia. Hydralazine was also stopped due to low BPs. For blood pressures, patient is now maintained on furosemide 40 mg BID, amlodipine 5 mg daily, and carvedilol 25 mg BID.     Follow Up Post Discharge    Confirmed above instructions with patient's daughter.      Waiting to hear from Dr. Basilio about scheduling a follow up appointment this week. Will call patient's daughter to confirm date and time once I hear back.     Patient Education    - medication changes  - low potassium diet  - need to start dialysis planning     Plan  - will follow up with patient's daughter this afternoon or tomorrow to confirm follow up appointment  - will also arrange dietician appointment as patient did not meet with a dietician while inpatient    Pravin Cordova, RN, BAN  Nephrology RN Care Coordinator

## 2018-01-30 DIAGNOSIS — N18.5 CKD (CHRONIC KIDNEY DISEASE) STAGE 5, GFR LESS THAN 15 ML/MIN (H): Primary | ICD-10-CM

## 2018-01-30 NOTE — PROGRESS NOTES
SUBJECTIVE:   Supriya Herr is a 68 year old female who presents to clinic today for the following health issues:    Hospital Follow-up Visit and Certification of need for wheelchair    Hospital/Nursing Home/IP Rehab Facility: [facility]  Date of Admission: 1/24/18  Date of Discharge: 1/26/18  Reason(s) for Admission: hyperkalemia            Problems taking medications regularly:  None       Medication changes since discharge: None        Problems adhering to non-medication therapy:  None    Summary of hospitalization:  [facility] discharge summary reviewed  Diagnostic Tests/Treatments reviewed.  Follow up needed: labs and will see nephology  Other Healthcare Providers Involved in Patient s Care:      Cards, Nephrology  Update since discharge: improved.     Post Discharge Medication Reconciliation: discharge medications reconciled, continue medications without change.  Plan of care communicated with patient, family and caregiver     Coding guidelines for this visit:  Type of Medical   Decision Making Face-to-Face Visit       within 7 Days of discharge Face-to-Face Visit        within 14 days of discharge   Moderate Complexity [id number]   High Complexity [id number]              Encounter Diagnoses   Name Primary?     Hyperkalemia Yes     Anemia of chronic renal failure, stage 4 (severe) (H)      Traumatic amputation of left lower extremity above knee, subsequent encounter (H)  patient is wheelchair bound, needs new chair      Generalized muscle weakness      Chronic kidney disease, stage 4 (severe) (H)      At risk for falling         Problem list and histories reviewed & adjusted, as indicated.  Additional history: as documented    Labs reviewed in EPIC    Reviewed and updated as needed this visit by clinical staff  Tobacco  Allergies  Meds  Med Hx  Surg Hx  Fam Hx  Soc Hx      Reviewed and updated as needed this visit by Provider         ROS:  Constitutional, HEENT,  "cardiovascular, pulmonary, gi and gu systems are negative, except as otherwise noted.    OBJECTIVE:     /53 (BP Location: Left arm, Patient Position: Chair, Cuff Size: Adult Large)  Pulse 59  Temp 97.8  F (36.6  C) (Oral)  Ht 5' 5.75\" (1.67 m)  Wt 197 lb (89.4 kg)  SpO2 99%  BMI 32.04 kg/m2  Body mass index is 32.04 kg/(m^2).  GENERAL: alert, elderly and fatigued  NECK: no adenopathy, no asymmetry, masses, or scars and thyroid normal to palpation  RESP: lungs clear to auscultation - no rales, rhonchi or wheezes  CV: regular rates and rhythm and trace+ right lower extremity pitting edema to knee    ABDOMEN: soft, nontender, no hepatosplenomegaly, no masses and bowel sounds normal  MS: muscle wasting right leg and s/p above knee amputation right , stump without ulcer  SKIN: no suspicious lesions or rashes  NEURO: weakness of arms/ legs, sensory exam grossly normal and mentation intact  PSYCH: mentation appears normal, affect normal/bright    Diagnostic Test Results:     Results for orders placed or performed in visit on 01/31/18   Renal panel   Result Value Ref Range    Sodium 141 133 - 144 mmol/L    Potassium 5.2 3.4 - 5.3 mmol/L    Chloride 110 (H) 94 - 109 mmol/L    Carbon Dioxide 24 20 - 32 mmol/L    Anion Gap 8 3 - 14 mmol/L    Glucose 105 (H) 70 - 99 mg/dL    Urea Nitrogen 75 (H) 7 - 30 mg/dL    Creatinine 4.16 (H) 0.52 - 1.04 mg/dL    GFR Estimate 11 (L) >60 mL/min/1.7m2    GFR Estimate If Black 13 (L) >60 mL/min/1.7m2    Calcium 8.9 8.5 - 10.1 mg/dL    Phosphorus 4.7 (H) 2.5 - 4.5 mg/dL    Albumin 2.6 (L) 3.4 - 5.0 g/dL   Hemoglobin   Result Value Ref Range    Hemoglobin 8.7 (L) 11.7 - 15.7 g/dL   IRON AND IRON BINDING CAPACITY   Result Value Ref Range    Iron 40 35 - 180 ug/dL    Iron Binding Cap 258 240 - 430 ug/dL    Iron Saturation Index 16 15 - 46 %   FERRITIN   Result Value Ref Range    Ferritin 95 8 - 252 ng/mL     *Note: Due to a large number of results and/or encounters for the requested " time period, some results have not been displayed. A complete set of results can be found in Results Review.        ASSESSMENT/PLAN:             1. Hyperkalemia  Resolved, continue current medications   - Basic metabolic panel    2. Anemia of chronic renal failure, stage 4 (severe) (H)  Iron studies low normal ,     3. Traumatic amputation of left lower extremity above knee, subsequent encounter (H)  Needs new manual wheelchair,  Is unable to independently ambulate due to left lower leg amputation and has not tolerated prostheses . He can only transfer on her other leg/ not walk. She currently has a manual wheelchair but needs a new one as the old one is wearing out. He can do a brief one leg transfer on the right leg but can not ambulate.  Her arm strength is adequate to use a manual chair and her residence is set up to accomodate  a wheelchair.    4. Generalized muscle weakness  Will do what exercises she can but limited by CHF, CRD, anemia    5. Chronic kidney disease, stage 4 (severe) (H)  recheck    6. At risk for falling  Reviewed safety      See letter  See Patient Instructions    Noam Jackson MD  [facility]

## 2018-01-31 ENCOUNTER — OFFICE VISIT (OUTPATIENT)
Dept: NEPHROLOGY | Facility: CLINIC | Age: 68
End: 2018-01-31
Attending: INTERNAL MEDICINE
Payer: MEDICARE

## 2018-01-31 VITALS
RESPIRATION RATE: 20 BRPM | WEIGHT: 199.3 LBS | BODY MASS INDEX: 32.03 KG/M2 | HEART RATE: 60 BPM | HEIGHT: 66 IN | DIASTOLIC BLOOD PRESSURE: 62 MMHG | SYSTOLIC BLOOD PRESSURE: 150 MMHG

## 2018-01-31 DIAGNOSIS — E61.1 IRON DEFICIENCY: Primary | ICD-10-CM

## 2018-01-31 DIAGNOSIS — R80.9 PROTEINURIA, UNSPECIFIED TYPE: ICD-10-CM

## 2018-01-31 DIAGNOSIS — N25.81 SECONDARY RENAL HYPERPARATHYROIDISM (H): ICD-10-CM

## 2018-01-31 DIAGNOSIS — N18.4 TYPE 2 DIABETES MELLITUS WITH STAGE 4 CHRONIC KIDNEY DISEASE, WITH LONG-TERM CURRENT USE OF INSULIN (H): ICD-10-CM

## 2018-01-31 DIAGNOSIS — I10 HYPERTENSION GOAL BP (BLOOD PRESSURE) < 140/90: ICD-10-CM

## 2018-01-31 DIAGNOSIS — N18.5 CKD (CHRONIC KIDNEY DISEASE) STAGE 5, GFR LESS THAN 15 ML/MIN (H): ICD-10-CM

## 2018-01-31 DIAGNOSIS — E87.5 HYPERKALEMIA: ICD-10-CM

## 2018-01-31 DIAGNOSIS — E11.22 TYPE 2 DIABETES MELLITUS WITH STAGE 4 CHRONIC KIDNEY DISEASE, WITH LONG-TERM CURRENT USE OF INSULIN (H): ICD-10-CM

## 2018-01-31 DIAGNOSIS — Z79.4 TYPE 2 DIABETES MELLITUS WITH STAGE 4 CHRONIC KIDNEY DISEASE, WITH LONG-TERM CURRENT USE OF INSULIN (H): ICD-10-CM

## 2018-01-31 LAB
ALBUMIN SERPL-MCNC: 2.6 G/DL (ref 3.4–5)
ANION GAP SERPL CALCULATED.3IONS-SCNC: 8 MMOL/L (ref 3–14)
BUN SERPL-MCNC: 75 MG/DL (ref 7–30)
CALCIUM SERPL-MCNC: 8.9 MG/DL (ref 8.5–10.1)
CHLORIDE SERPL-SCNC: 110 MMOL/L (ref 94–109)
CO2 SERPL-SCNC: 24 MMOL/L (ref 20–32)
CREAT SERPL-MCNC: 4.16 MG/DL (ref 0.52–1.04)
FERRITIN SERPL-MCNC: 95 NG/ML (ref 8–252)
GFR SERPL CREATININE-BSD FRML MDRD: 11 ML/MIN/1.7M2
GLUCOSE SERPL-MCNC: 105 MG/DL (ref 70–99)
HGB BLD-MCNC: 8.7 G/DL (ref 11.7–15.7)
IRON SATN MFR SERPL: 16 % (ref 15–46)
IRON SERPL-MCNC: 40 UG/DL (ref 35–180)
PHOSPHATE SERPL-MCNC: 4.7 MG/DL (ref 2.5–4.5)
POTASSIUM SERPL-SCNC: 5.2 MMOL/L (ref 3.4–5.3)
SODIUM SERPL-SCNC: 141 MMOL/L (ref 133–144)
TIBC SERPL-MCNC: 258 UG/DL (ref 240–430)

## 2018-01-31 PROCEDURE — 80069 RENAL FUNCTION PANEL: CPT | Performed by: INTERNAL MEDICINE

## 2018-01-31 PROCEDURE — G0463 HOSPITAL OUTPT CLINIC VISIT: HCPCS | Mod: ZF

## 2018-01-31 PROCEDURE — 85018 HEMOGLOBIN: CPT | Performed by: INTERNAL MEDICINE

## 2018-01-31 PROCEDURE — 83550 IRON BINDING TEST: CPT | Performed by: INTERNAL MEDICINE

## 2018-01-31 PROCEDURE — 36415 COLL VENOUS BLD VENIPUNCTURE: CPT | Performed by: INTERNAL MEDICINE

## 2018-01-31 PROCEDURE — 83540 ASSAY OF IRON: CPT | Performed by: INTERNAL MEDICINE

## 2018-01-31 PROCEDURE — 82728 ASSAY OF FERRITIN: CPT | Performed by: INTERNAL MEDICINE

## 2018-01-31 RX ORDER — FERROUS SULFATE 325(65) MG
1 TABLET ORAL 2 TIMES DAILY
Qty: 180 TABLET | Refills: 1 | Status: SHIPPED | OUTPATIENT
Start: 2018-01-31 | End: 2018-05-18

## 2018-01-31 ASSESSMENT — PAIN SCALES - GENERAL: PAINLEVEL: NO PAIN (0)
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
## 2018-01-31 NOTE — MR AVS SNAPSHOT
After Visit Summary   1/31/2018    Supriya Herr    MRN: [id number]           Patient Information     Date Of Birth          1950        Visit Information        Provider Department      1/31/2018 10:20 AM Kathryn Basilio MD [facility] Nephrology        Care Instructions    Kidney Smart Class in Conference Room 1.4 on Wednesday 2/7 from 4:00-6:00 PM.    On call nephrologist/ hospital number: [phone].     Scheduling number: [phone]           Follow-ups after your visit        Follow-up notes from your care team     Return in about 3 months (around 4/30/2018).      Your next 10 appointments already scheduled     Feb 01, 2018  1:00 PM CST   Office Visit with Noam Jackson MD   [facility] ([facility]    [address] [place] [id number] [phone]           Bring a current list of meds and any records pertaining to this visit. For Physicals, please bring immunization records and any forms needing to be filled out. Please arrive 10 minutes early to complete paperwork.            Feb 06, 2018 11:00 AM CST   New Visit with Zari Thakkar LP   [facility] ([facility]  [address]  [facility] [id number] [phone]            Feb 06, 2018 12:00 PM CST   (Arrive by 11:45 AM)   Office Visit with Brenden Blackwell RPH   [facility] Therapy [facility] ([facility])    [address]  3rd [facility] [id number] [phone]           Bring a current list of meds and any records pertaining to this visit. For Physicals, please bring immunization records and any forms needing to be filled out. Please arrive 10 minutes early to complete paperwork.            Feb 06, 2018  1:00 PM CST   NUTRITION VISIT with Magalie Dillard RD   [facility] Solid Organ Transplant ([facility]  [place])    [address] [place] [address]  [address]  [facility] [address]   [phone]            Feb 16, 2018  3:00 PM CST   Return Visit with Zari Thakkar LP   [facility] ([facility])    [facility]  [address]  [facility] [address]   [phone]            Feb 21, 2018 10:45 AM CST   Lab with [facility] LAB   [facility] ([facility])    [address] [place] [address]  [address] [facility] [address] [phone]            Feb 21, 2018 11:35 AM CST   (Arrive by 11:05 AM)   Return Visit with Kathryn Basilio MD   [facility] Nephrology ([facility])    [address] [place] [address]  [address]  [facility] [address]   [phone]            Mar 02, 2018 11:30 AM CST   (Arrive by 11:15 AM)   RETURN DIABETES with Arabella Kamara PA-C   [facility] Endocrinology ([facility])    [address] [place] [address]  3rd Floor  [facility] [id number] [phone]            Mar 02, 2018 12:40 PM CST   (Arrive by 12:25 PM)   RETURN FOOT/ANKLE with Eleazar Pack DPM   [facility] ([facility])    [address] [place] [address]  [address] [facility] [address] [phone]              Who to contact     If you have questions or need follow up information about today's clinic visit or your schedule please contact [facility] NEPHROLOGY directly at [phone].  Normal or non-critical lab and imaging results will be communicated to you by MyChart, letter or phone within 4 business days after the clinic has received the results. If you do not hear from us within 7 days, please contact the clinic through MyChart or phone. If you have a critical or abnormal lab result, we will notify you by phone as soon as possible.  Submit refill requests through [employer] or call your pharmacy and they will forward the refill request to us. Please allow 3 business days for your refill  "to be completed.          Additional Information About Your Visit        [employer] Information     [employer] lets you send messages to your doctor, view your test results, renew your prescriptions, schedule appointments and more. To sign up, go to [email or web address] . Click on \"Log in\" on the left side of the screen, which will take you to the Welcome page. Then click on \"Sign up Now\" on the right side of the page.     You will be asked to enter the access code listed below, as well as some personal information. Please follow the directions to create your username and password.     Your access code is: [email or web address]  Expires: 3/13/2018  4:12 PM     Your access code will  in 90 days. If you need help or a new code, please call your [facility] clinic or [phone].        Care EveryWhere ID     This is your Care EveryWhere ID. This could be used by other organizations to access your [facility] medical records  [id number]        Your Vitals Were     Pulse Respirations Height BMI (Body Mass Index)          60 20 1.676 m (5' 6\") 32.17 kg/m2         Blood Pressure from Last 3 Encounters:   18 150/62   18 (!) 118/28   18 95/60    Weight from Last 3 Encounters:   18 90.4 kg (199 lb 4.8 oz)   18 90.7 kg (199 lb 15.3 oz)   18 90 kg (198 lb 8 oz)              Today, you had the following     No orders found for display         Today's Medication Changes          These changes are accurate as of 18 11:47 AM.  If you have any questions, ask your nurse or doctor.               These medicines have changed or have updated prescriptions.        Dose/Directions    acetaminophen 325 MG tablet   Commonly known as:  TYLENOL   This may have changed:    - when to take this  - reasons to take this   Used for:  Cellulitis of right leg        Dose:  650 mg   Take 2 tablets (650 mg) by mouth 4 times daily   Quantity:  100 tablet   Refills:  0                Primary Care Provider Office Phone # " Fax #    Noam Luis Jackson [phone]       [address] [address]        Equal Access to Services     KALYANI WARREN : Emeterio danisha jacobson aniadebbie Carole, waaxda luqadaha, qaybta kaalmada ailin, madeline bond gristere marin indigo chaudhari. So [facility] [phone].    ATENCIÓN: Si habla español, tiene a santoro disposición servicios gratuitos de asistencia lingüística. Xeniaame al [phone].    We comply with applicable federal civil rights laws and [place] laws. We do not discriminate on the basis of race, color, national origin, age, disability, sex, sexual orientation, or gender identity.            Thank you!     Thank you for choosing [facility] NEPHROLOGY  for your care. Our goal is always to provide you with excellent care. Hearing back from our patients is one way we can continue to improve our services. Please take a few minutes to complete the written survey that you may receive in the mail after your visit with us. Thank you!             Your Updated Medication List - Protect others around you: Learn how to safely use, store and throw away your medicines at www.disposemymeds.org.          This list is accurate as of 1/31/18 11:47 AM.  Always use your most recent med list.                   Brand Name Dispense Instructions for use Diagnosis    acetaminophen 325 MG tablet    TYLENOL    100 tablet    Take 2 tablets (650 mg) by mouth 4 times daily    Cellulitis of right leg       albuterol 108 (90 BASE) MCG/ACT Inhaler    PROAIR HFA/PROVENTIL HFA/VENTOLIN HFA    3 Inhaler    Inhale 2 puffs into the lungs every 6 hours as needed for shortness of breath / dyspnea or wheezing    Cough       amLODIPine 5 MG tablet    NORVASC    30 tablet    Take 1 tablet (5 mg) by mouth daily    Type 2 diabetes mellitus with stage 3 chronic kidney disease, without long-term current use of insulin (H), Other fatigue       ASPIRIN NOT PRESCRIBED    INTENTIONAL    0 each    1 each continuous prn  Antiplatelet medication not prescribed intentionally due to current nosebleeds    Anemia due to blood loss, acute       atorvastatin 20 MG tablet    LIPITOR    90 tablet    Take 1 tablet (20 mg) by mouth daily    Hyperlipidemia LDL goal <100       * blood glucose monitoring test strip    ONETOUCH ULTRA    200 strip    Use to test blood sugars 2 times daily or as directed.    Type 2 diabetes mellitus with stage 3 chronic kidney disease, without long-term current use of insulin (H)       * blood glucose monitoring test strip    ONETOUCH ULTRA    200 strip    Test your blood sugar 3-4 times per day.    Type 2 diabetes mellitus with hyperglycemia, with long-term current use of insulin (H)       carvedilol 25 MG tablet    COREG    180 tablet    Take 1 tablet (25 mg) by mouth 2 times daily (with meals)    Essential hypertension with goal blood pressure less than 140/90       DULoxetine 30 MG EC capsule    CYMBALTA    180 capsule    Take 1 capsule (30 mg) by mouth 2 times daily    Type 2 diabetes mellitus with diabetic nephropathy, with long-term current use of insulin (H), Diabetic polyneuropathy associated with diabetes mellitus due to underlying condition (H)       furosemide 80 MG tablet    LASIX    90 tablet    Take 40 mg by mouth 2 times daily    Lymphedema of both lower extremities       insulin glargine 100 UNIT/ML injection    LANTUS    3 mL    Inject 20 Units Subcutaneous At Bedtime    Type 2 diabetes mellitus with diabetic neuropathy, with long-term current use of insulin (H)       insulin pen needle 31G X 6 MM    ULTICARE MINI    100 each    Use daily or as directed.    Type 2 diabetes, HbA1c goal < 7% (H)       lactobacillus rhamnosus (GG) capsule     60 capsule    Take 1 capsule by mouth 2 times daily    Cellulitis of right leg       miconazole 2 % powder    MICATIN; MICRO GUARD    71 g    Apply topically 2 times daily    Fungal dermatitis       MULTIVITAMIN PO      1 tablet by mouth daily        NovoLOG  FLEXPEN 100 UNIT/ML injection   Generic drug:  insulin aspart     15 mL    Inject 4 units SQ with breakfast, lunch and dinner.    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    Edema of lower extremity       tolterodine 2 MG 24 hr capsule    DETROL LA    60 capsule    TAKE 1 CAPSULE (2 MG) BY MOUTH DAILY    Urge incontinence of urine       Urea 20 % Crea cream     85 g    Apply topically daily    Xerosis cutis, Tyloma       * Notice:  This list has 6 medication(s) that are the same as other medications prescribed for you. Read the directions carefully, and ask your doctor or other care provider to review them with you.

## 2018-01-31 NOTE — NURSING NOTE
"Chief Complaint   Patient presents with     RECHECK     Ckd follow up       Initial /62  Pulse 60  Resp 20  Ht 1.676 m (5' 6\")  Wt 90.4 kg (199 lb 4.8 oz)  BMI 32.17 kg/m2 Estimated body mass index is 32.17 kg/(m^2) as calculated from the following:    Height as of this encounter: 1.676 m (5' 6\").    Weight as of this encounter: 90.4 kg (199 lb 4.8 oz).  Medication Reconciliation: complete   SEVERINO WRIGHT CMA      "

## 2018-01-31 NOTE — PATIENT INSTRUCTIONS
Kidney Smart Class in Conference Room 1.4 on Wednesday 2/7 from 4:00-6:00 PM.    On call nephrologist/ hospital number: [phone].     Scheduling number: [phone]

## 2018-02-01 ENCOUNTER — ALLIED HEALTH/NURSE VISIT (OUTPATIENT)
Dept: TRANSPLANT | Facility: CLINIC | Age: 68
End: 2018-02-01
Payer: MEDICARE

## 2018-02-01 ENCOUNTER — CARE COORDINATION (OUTPATIENT)
Dept: NEPHROLOGY | Facility: CLINIC | Age: 68
End: 2018-02-01

## 2018-02-01 ENCOUNTER — OFFICE VISIT (OUTPATIENT)
Dept: FAMILY MEDICINE | Facility: CLINIC | Age: 68
End: 2018-02-01
Payer: MEDICARE

## 2018-02-01 VITALS
BODY MASS INDEX: 31.66 KG/M2 | HEIGHT: 66 IN | OXYGEN SATURATION: 99 % | HEART RATE: 59 BPM | DIASTOLIC BLOOD PRESSURE: 53 MMHG | TEMPERATURE: 97.8 F | SYSTOLIC BLOOD PRESSURE: 117 MMHG | WEIGHT: 197 LBS

## 2018-02-01 DIAGNOSIS — N18.4 ANEMIA OF CHRONIC RENAL FAILURE, STAGE 4 (SEVERE) (H): ICD-10-CM

## 2018-02-01 DIAGNOSIS — M62.81 GENERALIZED MUSCLE WEAKNESS: ICD-10-CM

## 2018-02-01 DIAGNOSIS — Z01.818 ENCOUNTER FOR OTHER PREPROCEDURAL EXAMINATION: ICD-10-CM

## 2018-02-01 DIAGNOSIS — N18.5 CKD (CHRONIC KIDNEY DISEASE) STAGE 5, GFR LESS THAN 15 ML/MIN (H): Primary | ICD-10-CM

## 2018-02-01 DIAGNOSIS — D63.1 ANEMIA OF CHRONIC RENAL FAILURE, STAGE 4 (SEVERE) (H): ICD-10-CM

## 2018-02-01 DIAGNOSIS — N18.5 CHRONIC KIDNEY DISEASE, STAGE 5, KIDNEY FAILURE (H): Primary | ICD-10-CM

## 2018-02-01 DIAGNOSIS — S78.112D: ICD-10-CM

## 2018-02-01 DIAGNOSIS — N18.4 CHRONIC KIDNEY DISEASE, STAGE 4 (SEVERE) (H): Chronic | ICD-10-CM

## 2018-02-01 DIAGNOSIS — Z45.2 ADJUSTMENT AND MANAGEMENT OF VASCULAR ACCESS DEVICE: ICD-10-CM

## 2018-02-01 DIAGNOSIS — E87.5 HYPERKALEMIA: Primary | ICD-10-CM

## 2018-02-01 DIAGNOSIS — Z91.81 AT RISK FOR FALLING: ICD-10-CM

## 2018-02-01 PROCEDURE — 99495 TRANSJ CARE MGMT MOD F2F 14D: CPT | Performed by: FAMILY MEDICINE

## 2018-02-01 PROCEDURE — 80048 BASIC METABOLIC PNL TOTAL CA: CPT | Performed by: FAMILY MEDICINE

## 2018-02-01 PROCEDURE — 97802 MEDICAL NUTRITION INDIV IN: CPT | Mod: ZF | Performed by: DIETITIAN, REGISTERED

## 2018-02-01 PROCEDURE — 36415 COLL VENOUS BLD VENIPUNCTURE: CPT | Performed by: FAMILY MEDICINE

## 2018-02-01 NOTE — PROGRESS NOTES
"Outpatient MNT     Time Spent: 60 minutes  Visit Type: Initial  Referring Physician: Dr Basilio   Reason for RD Visit: CKD stage 5   Pt accompanied by: her daughter, Caroline, and her sister, Caroline     Nutrition Assessment  Pt is likely going to start HD soon. Plan for fistula placement in the near future. Pt d/c'd from the hospital last Friday and is back home, living with her daughter, whom she has lived with for several years. Pt does have a separate living area, but does eat some of the dinners her daughter makes. According to her daughter, pt is more of a \"traditional eater\", whereas the daughter makes more fresh and minimally processed meals, which pt isn't the fondest of. Since discharging from the hospital, pt's daughter has done a lot of research online about foods with potassium, and pt has since cut out the majority of these foods (of note, below diet recall shows a typical day prior to making changes in diet). They are most concerned with potassium foods, as she was recently admitted for hyperkalemia.     Appetite: not eating much since hospital d/c last week and is having cravings for food she no longer can eat     Vitamins, Supplements, Pertinent Meds: iron, MVI, vit D  Herbal Medicines/Supplements: none     Diet Recall  Breakfast English muffin with PB or cereal with milk or bread with butter or cream of wheat or packets of oats made with water    Lunch Bologna, tuna, deli meat s/w with cottage cheese, or PBJ with pickles on the side or with chips    Dinner Roast with carrots and potatoes or Healthy Choice Steamers meals or green giant brussels sprouts/broccoli/cauliflower in light sauce or baked potato and fish sticks or pork/chix with sauteed veggies and small amount of rice    Snacks Goldfish, popcorn, chips, apples, bananas, grapes, candy, ice cream sandwiches, cheese and crackers with summer sausage     Beverages Cocoa with water, chocolate milk, Diet Brisk iced tea, V8 or V8 splash, water, OJ, Diet " Dew, cranberry juice, virgin bloody ginny    Alcohol None    Dining out A few times/week - pizza, [employer], Means meatloaf, [employer] caf[employer]ia  salad, [place]      Physical Activity  Pt is in a wheelchair 24/7, but is able to push self      Procedures with Nutritional Implications  Plan for upcoming dialysis, unsure exactly when this will start     Anthropometrics  Height:   66 in   BMI:    32.1    Weight Status:Obesity Grade I BMI 30-34.9   Weight:  199 lbs            IBW (lb): 130  % IBW: 153    Wt Hx: IP 11/2017 pt weighed 233 lbs. Pt and family report likely combination of fluid losses and some actual weight loss, but overall they report it is hard to tell. They report no muscle loss. Currently no edema per pt report. Weight relatively stable lately.     Adj/dosing BW: 147 lbs/67 kg       Malnutrition  % Intake: No decreased intake noted  % Weight Loss: difficult to assess with combination of fluid and dry weight loss   Subcutaneous Fat Loss: Mild  Muscle Loss: None  Fluid Accumulation/Edema: None noted  Malnutrition Diagnosis: Patient does not meet two of the above criteria necessary for diagnosing malnutrition    Estimated Nutrition Needs  Energy  9805-9291     (25-30 kcal/kg for maintenance)     Protein  40-54    (0.6-0.8 vs 1.2-1.4 g/kg for CKD non dialysis vs dialysis)         Fluid  1 ml/kcal or per MD     Nutrition Diagnosis  Food and nutrition-related knowledge deficit related to no previous CKD diet ed as evidenced by pt/family report, RD referral.     Nutrition Intervention  Reviewed components of renal/dialysis diet: (1) Na+ <2000 mg/day (2) low K+ (3) low Phos (4) low vs high protein pending dialysis    Pt's daughter is most concerned about K+ content of foods. Discussed aiming for 7303-3261 mg/day, however, I do not suggest counting unless she feels inclined to do so. Instead, reviewed both high and low K+ foods and with knowing what foods have K+, she should be ok just avoiding these.  "In addition, K+ is not required to be on the nutrition label so she may have a hard time keeping track numerically. Encouraged pt to go off of her labs and if K+ remains WNL for several times, she can be a little more liberal (ie 1/2 potato 1x/week or something similar), however, with concern of recent hyperkalemia, would suggest being more strict to start.     Reviewed high Phos foods to limit/avoid and also protein goals for now and then if/when dialysis starts.     Reviewed Na+ and food labels online of her frozen veggies with sauce and Healthy Choice Steamers. Provided other names of products that would be ok - Lean Cuisine, Smart Ones, etc. Encouraged pt/family to look at labels as, some products may have more sodium than others. Reviewed her [employer] order (tuna 6\" flatbread with lettuce) and to look online (if nutrition info is available) to help plan ahead. Discussed sodium mostly when eating out. Pt's daughter is wondering about potassium when eating out, which would be harder to track except for avoiding the known high K+ foods. Discussed foods that would be acceptable when on a roadtrip or vacation (gas station items such as appropriate fruit, appropriate protein bars, boiled eggs, mozzarella string cheese, etc) and keeping it simple at restaurants (ordering a plain piece of chicken or steak with side of steamed veggies and a roll instead of meatloaf or a combination dish that is likely prepared ahead of time with more unknown ingredients). Can ask for no added salt at restaurants.     Provided pt's dtr and sister with kidney friendly recipes and meal ideas to help with meal planning.     Patient Understanding: Pt verbalized understanding of education provided.  Expected Compliance: Good  Follow-Up Plans: PRN     Nutrition Goals  1. Na+ <2000 mg/day  2. Avoid high K+ foods with h/o hyperkalemia     Provided pt with contact info.   Magalie Dillard, RD, LD  Pgr " [phone]

## 2018-02-01 NOTE — NURSING NOTE
"Chief Complaint   Patient presents with     Hospital F/U     Eval/Assessment     Wheelchair       Initial /53 (BP Location: Left arm, Patient Position: Chair, Cuff Size: Adult Large)  Pulse 59  Temp 97.8  F (36.6  C) (Oral)  SpO2 99% Estimated body mass index is 32.17 kg/(m^2) as calculated from the following:    Height as of 1/31/18: 5' 6\" (1.676 m).    Weight as of 1/31/18: 199 lb 4.8 oz (90.4 kg).  Medication Reconciliation: complete     Celestina Wei CMA    "

## 2018-02-01 NOTE — MR AVS SNAPSHOT
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, subsequent encounter (H)        Generalized muscle weakness        Chronic kidney disease, stage 4 (severe) (H)        At risk for falling           Follow-ups after your visit        Your next 10 appointments already scheduled     Feb 01, 2018  2:30 PM CST   NUTRITION VISIT with Magalie Dillard RD   [facility] Solid Organ Transplant ([facility])    [address]  [address]  [facility] [id number] [phone]            Feb 06, 2018 11:00 AM CST   New Visit with Zari Thakkar LP   [facility]  [address] [place], [place] [id number]  [facility] [id number] [phone]            Feb 06, 2018 12:00 PM CST   (Arrive by 11:45 AM)   Office Visit with Brenden [facility] ([facility])    [address]  3rd Floor  [facility] [id number] [phone]           Bring a current list of meds and any records pertaining to this visit. For Physicals, please bring immunization records and any forms needing to be filled out. Please arrive 10 minutes early to complete paperwork.            Feb 07, 2018  4:00 PM CST   Nurse Visit with  NEPHROLOGY NURSE   [facility] Solid Organ Transplant ([facility])    [address]  [address]  [facility] [id number] [phone]            Feb 16, 2018  3:00 PM CST   Return Visit with Zari Thakkar LP   [facility]  [place] [facility]    [facility]  [address]  [facility] [address]   [phone]            Feb 21, 2018 10:45 AM CST   Lab with JENNIFER LAB   [facility] ([facility])    [address]  1st [address]  [facility] [address] [phone]            Feb 21, 2018 11:35 AM CST   (Arrive by 11:05 AM)   Return Visit with Kathryn Basilio MD   [facility] Nephrology ([facility])    [address]  [address]  [facility] [address] [phone]            Mar 02, 2018 11:30 AM CST   (Arrive by 11:15 AM)   RETURN DIABETES with Arabella Kamara PA-C   [facility] Endocrinology ([facility])    [address]  3rd Floor  [facility] [address] [phone]            Mar 02, 2018 12:40 PM CST   (Arrive by 12:25 PM)   RETURN FOOT/ANKLE with Eleazar Pack DPM   [facility] Orthopaedic [facility] ([facility])    [address]  4th [address]  [facility] [address] [phone]              Future tests that were ordered for you today     Open Future Orders        Priority Expected Expires Ordered    US Upper Extremity Venous Mapping Bilateral Routine 2/1/2018 2/2/2019 2/1/2018            Who to contact     If you have questions or need follow up information about today's clinic visit or your schedule please contact [facility] directly at [phone].  Normal or non-critical lab and imaging results will be communicated to you by MyChart, letter or phone within 4 business days after the clinic has received the results. If you do not hear from us within 7 days, please contact the clinic through MyChart or phone. If you have a critical or abnormal lab result, we will notify you by phone as soon as possible.  Submit refill requests through [employer] or call your pharmacy and they will forward the refill request to us. Please allow 3 business  "days for your refill to be completed.          Additional Information About Your Visit        MyChart Information     [employer] lets you send messages to your doctor, view your test results, renew your prescriptions, schedule appointments and more. To sign up, go to [email or web address] . Click on \"Log in\" on the left side of the screen, which will take you to the Welcome page. Then click on \"Sign up Now\" on the right side of the page.     You will be asked to enter the access code listed below, as well as some personal information. Please follow the directions to create your username and password.     Your access code is: [email or web address]  Expires: 3/13/2018  4:12 PM     Your access code will  in 90 days. If you need help or a new code, please call your [facility] clinic or [phone].        Care EveryWhere ID     This is your Care EveryWhere ID. This could be used by other organizations to access your [facility] medical records  [id number]        Your Vitals Were     Pulse Temperature Height Pulse Oximetry BMI (Body Mass Index)       59 97.8  F (36.6  C) (Oral) 5' 5.75\" (1.67 m) 99% 32.04 kg/m2        Blood Pressure from Last 3 Encounters:   18 117/53   18 150/62   18 (!) 118/28    Weight from Last 3 Encounters:   18 197 lb (89.4 kg)   18 199 lb 4.8 oz (90.4 kg)   18 199 lb 15.3 oz (90.7 kg)              We Performed the Following     Basic metabolic panel          Today's Medication Changes          These changes are accurate as of 18  1:37 PM.  If you have any questions, ask your nurse or doctor.               These medicines have changed or have updated prescriptions.        Dose/Directions    acetaminophen 325 MG tablet   Commonly known as:  TYLENOL   This may have changed:    - when to take this  - reasons to take this   Used for:  Cellulitis of right leg        Dose:  650 mg   Take 2 tablets (650 mg) by mouth 4 times daily   Quantity:  100 tablet   Refills:  0    "             Primary Care Provider Office Phone # Fax #    Noam Luis Jackson [phone]       [address] [facility] [address]  [facility] [address]        Equal Access to Services     KALYANI WARREN : Hadraya danisha jacobson aniao Sorocali, waaxda luqadaha, qaybta kaalmada ademaria alejandra, madeline bond gristere marin indigo chaudhari. So [facility] [phone].    ATENCIÓN: Si habla español, tiene a santoro disposición servicios gratuitos de asistencia lingüística. Llame al [phone].    We comply with applicable federal civil rights laws and [place] laws. We do not discriminate on the basis of race, color, national origin, age, disability, sex, sexual orientation, or gender identity.            Thank you!     Thank you for choosing [facility]  for your care. Our goal is always to provide you with excellent care. Hearing back from our patients is one way we can continue to improve our services. Please take a few minutes to complete the written survey that you may receive in the mail after your visit with us. Thank you!             Your Updated Medication List - Protect others around you: Learn how to safely use, store and throw away your medicines at www.disposemymeds.org.          This list is accurate as of 2/1/18  1:37 PM.  Always use your most recent med list.                   Brand Name Dispense Instructions for use Diagnosis    acetaminophen 325 MG tablet    TYLENOL    100 tablet    Take 2 tablets (650 mg) by mouth 4 times daily    Cellulitis of right leg       albuterol 108 (90 BASE) MCG/ACT Inhaler    PROAIR HFA/PROVENTIL HFA/VENTOLIN HFA    3 Inhaler    Inhale 2 puffs into the lungs every 6 hours as needed for shortness of breath / dyspnea or wheezing    Cough       amLODIPine 5 MG tablet    NORVASC    30 tablet    Take 1 tablet (5 mg) by mouth daily    Type 2 diabetes mellitus with stage 3 chronic kidney disease, without long-term current use of insulin (H), Other fatigue       ASPIRIN NOT  PRESCRIBED    INTENTIONAL    0 each    1 each continuous prn Antiplatelet medication not prescribed intentionally due to current nosebleeds    Anemia due to blood loss, acute       atorvastatin 20 MG tablet    LIPITOR    90 tablet    Take 1 tablet (20 mg) by mouth daily    Hyperlipidemia LDL goal <100       * blood glucose monitoring test strip    ONETOUCH ULTRA    200 strip    Use to test blood sugars 2 times daily or as directed.    Type 2 diabetes mellitus with stage 3 chronic kidney disease, without long-term current use of insulin (H)       * blood glucose monitoring test strip    ONETOUCH ULTRA    200 strip    Test your blood sugar 3-4 times per day.    Type 2 diabetes mellitus with hyperglycemia, with long-term current use of insulin (H)       carvedilol 25 MG tablet    COREG    180 tablet    Take 1 tablet (25 mg) by mouth 2 times daily (with meals)    Essential hypertension with goal blood pressure less than 140/90       DULoxetine 30 MG EC capsule    CYMBALTA    180 capsule    Take 1 capsule (30 mg) by mouth 2 times daily    Type 2 diabetes mellitus with diabetic nephropathy, with long-term current use of insulin (H), Diabetic polyneuropathy associated with diabetes mellitus due to underlying condition (H)       ferrous sulfate 325 (65 FE) MG tablet    IRON    180 tablet    Take 1 tablet (325 mg) by mouth 2 times daily    Iron deficiency       furosemide 80 MG tablet    LASIX    90 tablet    Take 40 mg by mouth 2 times daily    Lymphedema of both lower extremities       insulin glargine 100 UNIT/ML injection    LANTUS    3 mL    Inject 20 Units Subcutaneous At Bedtime    Type 2 diabetes mellitus with diabetic neuropathy, with long-term current use of insulin (H)       insulin pen needle 31G X 6 MM    ULTICARE MINI    100 each    Use daily or as directed.    Type 2 diabetes, HbA1c goal < 7% (H)       lactobacillus rhamnosus (GG) capsule     60 capsule    Take 1 capsule by mouth 2 times daily    Cellulitis of  right leg       miconazole 2 % powder    MICATIN; MICRO GUARD    71 g    Apply topically 2 times daily    Fungal dermatitis       MULTIVITAMIN PO      1 tablet by mouth daily        NovoLOG FLEXPEN 100 UNIT/ML injection   Generic drug:  insulin aspart     15 mL    Inject 4 units SQ with breakfast, lunch and dinner.    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    Edema of lower extremity       tolterodine 2 MG 24 hr capsule    DETROL LA    60 capsule    TAKE 1 CAPSULE (2 MG) BY MOUTH DAILY    Urge incontinence of urine       Urea 20 % Crea cream     85 g    Apply topically daily    Xerosis cutis, Tyloma       * Notice:  This list has 6 medication(s) that are the same as other medications prescribed for you. Read the directions carefully, and ask your doctor or other care provider to review them with you.

## 2018-02-01 NOTE — LETTER
[facility]  [address]  [facility] [address] [phone]          February 1, 2018    RE:  Supriya Herr                                                                                                                                                       [address]  [facility] [address]            To whom it may concern:    Supriya Herr is under my professional care for nasim health concerns and needs a new manual wheelchair due to    1. 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 He can only transfer on her other leg/ not walk. She currently has a manual wheelchair but needs a new one as the old one is wearing out. He can do a brief one leg transfer on the right leg but can not ambulate.  Her arm strength is adequate to use a manual chair and her residence is set up to accomodate  a wheelchair.     Sincerely,        Noam Jackson MD

## 2018-02-01 NOTE — PROGRESS NOTES
Reason for Call    Met with patient and her daughter yesterday while they were here for an appointment with Dr. Basilio for hospital follow up.     Discussed referral to [facility] for dialysis education. I also gave overview of dialysis options. Patient is 99% sure that she'd like to do in-center [facility]mo, so will also go ahead and refer for access placement.     Also referred patient to meet with our dietician for CKD/ low potassium diet education.    Will follow up with patient and her daughter next week after they've attended [facility].    Pravin Cordova RN

## 2018-02-01 NOTE — MR AVS SNAPSHOT
After Visit Summary   2/1/2018    Supriya Herr    MRN: [id number]           Patient Information     Date Of Birth          1950        Visit Information        Provider Department      2/1/2018 2:30 PM Magalie Dillard RD [facility] Solid Organ Transplant        Today's Diagnoses     Chronic kidney disease, stage 5, kidney failure (H)    -  1       Follow-ups after your visit        Your next 10 appointments already scheduled     Feb 06, 2018 11:00 AM CST   New Visit with Zari Thakkar LP   [facility] ([facility])    [facility]  [address] [place] [id number]   [phone]            Feb 06, 2018 12:00 PM CST   (Arrive by 11:45 AM)   [facility]    [address] [place] [address]  [address] [facility] [id number] [phone]           Bring a current list of meds and any records pertaining to this visit. For Physicals, please bring immunization records and any forms needing to be filled out. Please arrive 10 minutes early to complete paperwork.            Feb 07, 2018  4:00 PM CST   Nurse Visit with  NEPHROLOGY NURSE   [facility] Solid Organ Transplant ([facility])    [address]  [address]  [facility] [address] [phone]            Feb 16, 2018  3:00 PM CST   Return Visit with Zari Thakkar LP   [facility] ([facility]  [address] [place] [id number] [phone]            Feb 21, 2018 10:45 AM CST   Lab with JENNIFER LAB    Health Lab ([facility])    [address] [facility] [address]  [address] [facility] [address] [phone]            Feb 21, 2018 11:35 AM CST   (Arrive by 11:05 AM)   Return Visit with Kathryn Basilio MD   [facility] Nephrology ([facility]  "Center)    [address]  [address]  [facility] [address]   [phone]            Mar 02, 2018 11:30 AM CST   (Arrive by 11:15 AM)   RETURN DIABETES with Arabella Kamara PA-C   [facility] Endocrinology ([facility])    [address]  3rd Floor  [facility] [address]   [phone]            Mar 02, 2018 12:40 PM CST   (Arrive by 12:25 PM)   RETURN FOOT/ANKLE with Eleazar Pack DPM   [facility] ([facility])    [address]  4th Floor  [facility] [address]   [phone]              Future tests that were ordered for you today     Open Future Orders        Priority Expected Expires Ordered    US Upper Extremity Venous Mapping Bilateral Routine 2/1/2018 2/2/2019 2/1/2018            Who to contact     If you have questions or need follow up information about today's clinic visit or your schedule please contact [facility] directly at [phone].  Normal or non-critical lab and imaging results will be communicated to you by [employer], letter or phone within 4 business days after the clinic has received the results. If you do not hear from us within 7 days, please contact the clinic through [email or web address] or phone. If you have a critical or abnormal lab result, we will notify you by phone as soon as possible.  Submit refill requests through [employer] or call your pharmacy and they will forward the refill request to us. Please allow 3 business days for your refill to be completed.          Additional Information About Your Visit        [employer] Information     [employer] lets you send messages to your doctor, view your test results, renew your prescriptions, schedule appointments and more. To sign up, go to [email or web address] . Click on \"Log in\" on the left side of the screen, which will take you to the Welcome page. Then click on \"Sign up Now\" on the right side of the page.     You will be " asked to enter the access code listed below, as well as some personal information. Please follow the directions to create your username and password.     Your access code is: [email or web address]  Expires: 3/13/2018  4:12 PM     Your access code will  in 90 days. If you need help or a new code, please call your [facility] clinic or [phone].        Care EveryWhere ID     This is your Care EveryWhere ID. This could be used by other organizations to access your [facility] medical records  [id number]         Blood Pressure from Last 3 Encounters:   18 117/53   18 150/62   18 (!) 118/28    Weight from Last 3 Encounters:   18 89.4 kg (197 lb)   18 90.4 kg (199 lb 4.8 oz)   18 90.7 kg (199 lb 15.3 oz)              We Performed the Following     MNT INDIVIDUAL INITIAL EA 15 MIN          Today's Medication Changes          These changes are accurate as of 18 11:59 PM.  If you have any questions, ask your nurse or doctor.               These medicines have changed or have updated prescriptions.        Dose/Directions    acetaminophen 325 MG tablet   Commonly known as:  TYLENOL   This may have changed:    - when to take this  - reasons to take this   Used for:  Cellulitis of right leg        Dose:  650 mg   Take 2 tablets (650 mg) by mouth 4 times daily   Quantity:  100 tablet   Refills:  0                Primary Care Provider Office Phone # Fax #    Noam Luis Jackson [phone]       [address] [facility] [address]  [facility] [address]        Equal Access to Services     [facility]: Hadii danisha jorgeo Soaleksandar, waaxda luqadaha, qaybta kaalmamadeline augustine . So [facility] [phone].    ATENCIÓN: Si habla español, tiene a santoro disposición servicios gratuitos de asistencia lingüística. Llame al [phone].    We comply with applicable federal civil rights laws and [place] laws. We do not discriminate on the basis of race, color,  national origin, age, disability, sex, sexual orientation, or gender identity.            Thank you!     Thank you for choosing [facility]  for your care. Our goal is always to provide you with excellent care. Hearing back from our patients is one way we can continue to improve our services. Please take a few minutes to complete the written survey that you may receive in the mail after your visit with us. Thank you!             Your Updated Medication List - Protect others around you: Learn how to safely use, store and throw away your medicines at www.disposemymeds.org.          This list is accurate as of 2/1/18 11:59 PM.  Always use your most recent med list.                   Brand Name Dispense Instructions for use Diagnosis    acetaminophen 325 MG tablet    TYLENOL    100 tablet    Take 2 tablets (650 mg) by mouth 4 times daily    Cellulitis of right leg       albuterol 108 (90 BASE) MCG/ACT Inhaler    PROAIR HFA/PROVENTIL HFA/VENTOLIN HFA    3 Inhaler    Inhale 2 puffs into the lungs every 6 hours as needed for shortness of breath / dyspnea or wheezing    Cough       amLODIPine 5 MG tablet    NORVASC    30 tablet    Take 1 tablet (5 mg) by mouth daily    Type 2 diabetes mellitus with stage 3 chronic kidney disease, without long-term current use of insulin (H), Other fatigue       ASPIRIN NOT PRESCRIBED    INTENTIONAL    0 each    1 each continuous prn Antiplatelet medication not prescribed intentionally due to current nosebleeds    Anemia due to blood loss, acute       atorvastatin 20 MG tablet    LIPITOR    90 tablet    Take 1 tablet (20 mg) by mouth daily    Hyperlipidemia LDL goal <100       * blood glucose monitoring test strip    ONETOUCH ULTRA    200 strip    Use to test blood sugars 2 times daily or as directed.    Type 2 diabetes mellitus with stage 3 chronic kidney disease, without long-term current use of insulin (H)       * blood glucose monitoring test strip    ONETOUCH ULTRA     200 strip    Test your blood sugar 3-4 times per day.    Type 2 diabetes mellitus with hyperglycemia, with long-term current use of insulin (H)       carvedilol 25 MG tablet    COREG    180 tablet    Take 1 tablet (25 mg) by mouth 2 times daily (with meals)    Essential hypertension with goal blood pressure less than 140/90       DULoxetine 30 MG EC capsule    CYMBALTA    180 capsule    Take 1 capsule (30 mg) by mouth 2 times daily    Type 2 diabetes mellitus with diabetic nephropathy, with long-term current use of insulin (H), Diabetic polyneuropathy associated with diabetes mellitus due to underlying condition (H)       ferrous sulfate 325 (65 FE) MG tablet    IRON    180 tablet    Take 1 tablet (325 mg) by mouth 2 times daily    Iron deficiency       furosemide 80 MG tablet    LASIX    90 tablet    Take 40 mg by mouth 2 times daily    Lymphedema of both lower extremities       insulin glargine 100 UNIT/ML injection    LANTUS    3 mL    Inject 20 Units Subcutaneous At Bedtime    Type 2 diabetes mellitus with diabetic neuropathy, with long-term current use of insulin (H)       insulin pen needle 31G X 6 MM    ULTICARE MINI    100 each    Use daily or as directed.    Type 2 diabetes, HbA1c goal < 7% (H)       lactobacillus rhamnosus (GG) capsule     60 capsule    Take 1 capsule by mouth 2 times daily    Cellulitis of right leg       miconazole 2 % powder    MICATIN; MICRO GUARD    71 g    Apply topically 2 times daily    Fungal dermatitis       MULTIVITAMIN PO      1 tablet by mouth daily        NovoLOG FLEXPEN 100 UNIT/ML injection   Generic drug:  insulin aspart     15 mL    Inject 4 units SQ with breakfast, lunch and dinner.    Type 2 diabetes mellitus with hyperglycemia, with long-term current use of insulin (H)       * order for DME     1 Device    Equipment being ordered: Compression stockings, 20-30 MMHG, knee high    Edema, Hypertension goal BP (blood pressure) < 140/90       * order for DME     2 Device     Equipment being ordered: TEDS stocking  Below the knee 15-20 mg Dispense 2 Use daily    Localized edema       * order for DME     1 Device    1 wheelchair    Traumatic amputation of lower extremity above knee, unspecified laterality, subsequent encounter (H), CKD (chronic kidney disease) stage 3, GFR 30-59 ml/min, Type 2 diabetes mellitus with stage 3 chronic kidney disease, without long-term current use of insulin (H)       * order for DME     1 Units    Equipment being ordered: Right Lower extremity Solaris Ready wrap calf piece:  Size small/length tall , knee piece: Size small , Thigh piece size small/length average.    Edema of lower extremity       tolterodine 2 MG 24 hr capsule    DETROL LA    60 capsule    TAKE 1 CAPSULE (2 MG) BY MOUTH DAILY    Urge incontinence of urine       Urea 20 % Crea cream     85 g    Apply topically daily    Xerosis cutis, Tyloma       * Notice:  This list has 6 medication(s) that are the same as other medications prescribed for you. Read the directions carefully, and ask your doctor or other care provider to review them with you.

## 2018-02-02 LAB
ANION GAP SERPL CALCULATED.3IONS-SCNC: 6 MMOL/L (ref 3–14)
BUN SERPL-MCNC: 81 MG/DL (ref 7–30)
CALCIUM SERPL-MCNC: 9.2 MG/DL (ref 8.5–10.1)
CHLORIDE SERPL-SCNC: 111 MMOL/L (ref 94–109)
CO2 SERPL-SCNC: 25 MMOL/L (ref 20–32)
CREAT SERPL-MCNC: 4.26 MG/DL (ref 0.52–1.04)
GFR SERPL CREATININE-BSD FRML MDRD: 10 ML/MIN/1.7M2
GLUCOSE SERPL-MCNC: 53 MG/DL (ref 70–99)
POTASSIUM SERPL-SCNC: 5.1 MMOL/L (ref 3.4–5.3)
SODIUM SERPL-SCNC: 142 MMOL/L (ref 133–144)

## 2018-02-03 ENCOUNTER — TELEPHONE (OUTPATIENT)
Dept: NEPHROLOGY | Facility: CLINIC | Age: 68
End: 2018-02-03

## 2018-02-03 DIAGNOSIS — I95.2 HYPOTENSION DUE TO DRUGS: Primary | ICD-10-CM

## 2018-02-04 NOTE — TELEPHONE ENCOUNTER
Pt daughter called regarding Pt BP is low 90/38 laying and 103/53 standing , no orthostatic symptoms, no dizziness or lightheadedness. She is now on amlodipine 2.5mg daily and coreg 25 bid,     A/P hypotension: drug related   I advise her to hold amlodipine and dec coreg in 1/2. , pt to cont checking her BP and call Dr Basilio office Monday.

## 2018-02-05 ENCOUNTER — CARE COORDINATION (OUTPATIENT)
Dept: NEPHROLOGY | Facility: CLINIC | Age: 68
End: 2018-02-05

## 2018-02-05 ENCOUNTER — TELEPHONE (OUTPATIENT)
Dept: FAMILY MEDICINE | Facility: CLINIC | Age: 68
End: 2018-02-05

## 2018-02-05 DIAGNOSIS — N18.4 TYPE 2 DIABETES MELLITUS WITH STAGE 4 CHRONIC KIDNEY DISEASE, WITH LONG-TERM CURRENT USE OF INSULIN (H): ICD-10-CM

## 2018-02-05 DIAGNOSIS — Z79.4 TYPE 2 DIABETES MELLITUS WITH STAGE 4 CHRONIC KIDNEY DISEASE, WITH LONG-TERM CURRENT USE OF INSULIN (H): ICD-10-CM

## 2018-02-05 DIAGNOSIS — S78.112D: ICD-10-CM

## 2018-02-05 DIAGNOSIS — E11.22 TYPE 2 DIABETES MELLITUS WITH STAGE 4 CHRONIC KIDNEY DISEASE, WITH LONG-TERM CURRENT USE OF INSULIN (H): ICD-10-CM

## 2018-02-05 DIAGNOSIS — N18.4 CHRONIC KIDNEY DISEASE, STAGE 4 (SEVERE) (H): Primary | Chronic | ICD-10-CM

## 2018-02-05 NOTE — TELEPHONE ENCOUNTER
Dr. Jackson    Per daughter pt is prepping for dialysis    her BP did go down this weekend, pt didn't have any symptoms but it was in the 83/47, laying went lower. Did get back to 120s  The pt was taken off a couple of medications    They have class on wednesday in prep for dialysis    Daughter is leaving town for a week, pt sister is coming to help the pt    They would like SN to assess for home care needs    Referral cued    Viridiana Sprague RN   [facility]

## 2018-02-05 NOTE — PROGRESS NOTES
Reason for Call    Received call from patient's daughter, Caroline. She says she called the on-call nephrologist over the weekend due to patient's low BPs. On call nephrologist advised stopping amlodipine and reducing carvedilol from 25 mg BID to 12.5 mg BID.     Caroline reports patient's BPs have normalized with these changes. Reports the following readings over the weekend: 135/58, 115/59, 125/56. No dizziness or orthostatic symptoms.     Caroline wondering if patient should continue with medication changes. Advised to continue with just carvedilol and furosemide for now since BPs look good. Will update Dr. Basilio and see if she has any other recommendations.     Patient and daughter will continue to monitor BP and notify us if SBPs are <110 or >150.     Pravin Cordova, RN, BAN  Nephrology RN Care Coordinator     Phone: [phone]

## 2018-02-05 NOTE — TELEPHONE ENCOUNTER
Left VM for Caroline, referral has been sent for home care    Viridiana Sprague RN   [facility]

## 2018-02-05 NOTE — TELEPHONE ENCOUNTER
Pt's daughter is requesting orders for home care nursing due to low blood pressure and f/u ED.    Caroline can be reached @ [phone] rodney

## 2018-02-06 ENCOUNTER — OFFICE VISIT (OUTPATIENT)
Dept: PHARMACY | Facility: CLINIC | Age: 68
End: 2018-02-06
Payer: MEDICAID

## 2018-02-06 VITALS — HEART RATE: 63 BPM | DIASTOLIC BLOOD PRESSURE: 65 MMHG | SYSTOLIC BLOOD PRESSURE: 108 MMHG

## 2018-02-06 DIAGNOSIS — E11.65 TYPE 2 DIABETES MELLITUS WITH HYPERGLYCEMIA, WITH LONG-TERM CURRENT USE OF INSULIN (H): ICD-10-CM

## 2018-02-06 DIAGNOSIS — R35.0 URINARY FREQUENCY: ICD-10-CM

## 2018-02-06 DIAGNOSIS — G62.9 NEUROPATHY: ICD-10-CM

## 2018-02-06 DIAGNOSIS — N18.5 CKD (CHRONIC KIDNEY DISEASE) STAGE 5, GFR LESS THAN 15 ML/MIN (H): Primary | ICD-10-CM

## 2018-02-06 DIAGNOSIS — Z79.4 TYPE 2 DIABETES MELLITUS WITH HYPERGLYCEMIA, WITH LONG-TERM CURRENT USE OF INSULIN (H): ICD-10-CM

## 2018-02-06 DIAGNOSIS — E78.5 HYPERLIPIDEMIA LDL GOAL <100: ICD-10-CM

## 2018-02-06 DIAGNOSIS — I10 ESSENTIAL HYPERTENSION WITH GOAL BLOOD PRESSURE LESS THAN 140/90: ICD-10-CM

## 2018-02-06 PROCEDURE — 99605 MTMS BY PHARM NP 15 MIN: CPT | Performed by: PHARMACIST

## 2018-02-06 PROCEDURE — 99607 MTMS BY PHARM ADDL 15 MIN: CPT | Performed by: PHARMACIST

## 2018-02-06 NOTE — PROGRESS NOTES
SUBJECTIVE/OBJECTIVE:                           Supriya Herr is a 68 year old female coming in for an initial visit for Medication Therapy Management.  She was referred to me from Dr. Basilio. Caroline Gray, her daughter, joins us today.     Chief Complaint: Questions about dosing times, had a low blood pressure on Saturday: 108/49, 85/40 no dizziness, lightheadedness. Doc on call took her off Amlodipine and halved the Carvedilol.     Recent Hospitalizations: Hyperkalemia 1/2018, Cellulitis in 11/ 2017.    Allergies/ADRs: None  Tobacco: No tobacco use  Alcohol: not currently using  Caffeine: <1 cups/day of tea  Activity: chooses not exercises. Watching   PMH: Reviewed in Epic    Medication Adherence/Access  The patient misses their medication 0-1 times per week.  Pill box. Memory and adherence has improved in the past month or so, right after hospitalization in November she was having issues. Her daughter sets up her medications, and has not been noticing missed doses for the past month.   Pt takes doses twice daily, when she wakes up around 9-12 pm, in the evening 6-9    CKD: Pt is taking Ferrous Sulfate once daily started last week in the ER for anemia. Was having a BM every 4 days , it is a little harder since starting Ferrous Sulfate, but frequency is the same. Calcitriol 0.25mg daily. No edema seen today, she was recently reduced from Furosemide 80mg BID to 40mg BID. Her daughter notices that she has been urinating less frequently, unclear if this correlates with her Furosemide dose reduction.   Hemoglobin   Date Value Ref Range Status   01/31/2018 8.7 (L) 11.7 - 15.7 g/dL Final     Hypertension: Current medications include Carvedilol 12.5mg BID.  Patient does self-monitor BP. Home BP monitoring since dose change  125/56, 135/58.  Patient reports the following medication side effects: history of edema, none today. Pt was eating a lot of salt, prior to last month's hospitalization. Her daughter has recently  started cooking everything from scratch with no salt.     Diabetes:  Pt currently taking Lantus 20 units qevening, Novolog 4 units TID. Pt is not experiencing side effects.  SMBG: one time daily.   Ranges (patient reported): 138, 138, 128, 198, 151, 153, 139, 116, 131, 155, 169, 166, 131, 214  Patient is not experiencing hypoglycemia  Recent symptoms of high blood sugar? none    Neuropathy: Pt is taking Duloxetine 30mg BID, this has not been effective per patient. She has tried Gabapentin in the past. This may have made her more tired, but since she was already fatigued due to the 11/17 hospitalization it is difficult to differentiate. Pt states neuropathy pain is around 4.5/10, but it is mostly numbness. She still has Gabapentin 100mg daily. . Some numbness.     Urinary frequency/ Urgency: Pt is taking Tolterodine 2mg ER Daily. Patient's urination frequency and urgency has decreased lately, either due to dose decrease of Furosemide or progression of CKD.     Hyperlipidemia: Current therapy includes Atorvastatin 20mg once daily.  Pt reports no significant myalgias or other side effects.   The 10-year ASCVD risk score (Rock SOCORRO Jr, et al., 2013) is: 15.1%    Values used to calculate the score:      Age: 68 years      Sex: Female      Is Non- : No      Diabetic: Yes      Tobacco smoker: No      Systolic Blood Pressure: 108 mmHg      Is BP treated: Yes      HDL Cholesterol: 46 mg/dL      Total Cholesterol: 172 mg/dL    Current labs include:  BP Readings from Last 3 Encounters:   02/06/18 108/65   02/01/18 117/53   01/31/18 150/62     Today's Vitals: /65  Pulse 63  Lab Results   Component Value Date    A1C 6.8 01/09/2018   .  Lab Results   Component Value Date    CHOL 172 09/21/2017     Lab Results   Component Value Date    TRIG 288 09/21/2017     Lab Results   Component Value Date    HDL 46 09/21/2017     Lab Results   Component Value Date    LDL 68 09/21/2017       Liver Function Studies  -   Recent Labs   Lab Test  01/31/18   0934   11/13/17   0715   PROTTOTAL   --    --   7.1   ALBUMIN  2.6*   < >  1.8*   BILITOTAL   --    --   0.3   ALKPHOS   --    --   70   AST   --    --   30   ALT   --    --   37    < > = values in this interval not displayed.       Lab Results   Component Value Date    UCRR 48 01/09/2018    MICROL 2880 01/09/2018    UMALCR 6037.74 (H) 01/09/2018       Last Basic Metabolic Panel:  Lab Results   Component Value Date     02/01/2018      Lab Results   Component Value Date    POTASSIUM 5.1 02/01/2018     Lab Results   Component Value Date    CHLORIDE 111 02/01/2018     Lab Results   Component Value Date    BUN 81 02/01/2018     Lab Results   Component Value Date    CR 4.26 02/01/2018     GFR Estimate   Date Value Ref Range Status   02/01/2018 10 (L) >60 mL/min/1.7m2 Final     Comment:     Non  GFR Calc   01/31/2018 11 (L) >60 mL/min/1.7m2 Final     Comment:     Non  GFR Calc   01/26/2018 11 (L) >60 mL/min/1.7m2 Final     Comment:     Non  GFR Calc     TSH   Date Value Ref Range Status   01/09/2018 2.90 0.40 - 4.00 mU/L Final     Most Recent Immunizations   Administered Date(s) Administered     Influenza (High Dose) 3 valent vaccine 09/21/2017     Influenza (IIV3) PF 10/25/2006     Influenza Vaccine IM 3yrs+ 4 Valent IIV4 01/21/2016     Influenza Vaccine, 3 YRS +, IM (QUADRIVALENT W/PRESERVATIVES) 11/21/2016     Pneumo Conj 13-V (2010&after) 08/13/2015     Pneumococcal 23 valent 08/27/2012     TD (ADULT, 7+) 02/08/2006     TDAP Vaccine (Adacel) 07/18/2013     Zoster vaccine, live 08/13/2015       ASSESSMENT:                             Current medications were reviewed today.     Medication Adherence: no issues identified- her inconsistent dosing may be contributing to her lower blood pressures, eg. If she takes her BP medications at noon, then again at 6.     CKD: Needs improvement. Per order patient should increase to Ferrous  Sulfate 325mg BID after a week of use. Pt was unaware of this, but will increase in a week. May need Laxatives if patient has issues. She will start Culturelle again, to keep her regular. We discussed her sodium intake goal (<2000mg daily).    Hypertension: Needs improvement. BP well below goal of 130/80. Considering her recent history of lows, I do not recommend increasing Carvedilol at this time, or adding Amlodipine back on. These were appropriately changed. Pt's sudden diet change from an excess of sodium, to all meals from scratch and no added sodium may be contributing to these new lower BG. Pt to contact if she has more lows and or sx of dizziness.     Diabetes:  Stable. A1c at goal <7.5-8%, but likely falsely lowered due to anemia.     Neuropathy: Needs improvement. Pt is not receiving benefit from Duloxetine and it is not recommended for use in pts with CrCl <30ml/min. Recommend tapering off- 30mg daily x 3 weeks, then off. Will discuss with Dr. Jackson.    Urinary frequency/ urgency: Needs improvement. Pt no longer experiencing urgency or frequency, likely due to CKD. May not need tolterodine and patient is interested in a trial off. Will discuss with Dr. Jackson.     Hyperlipidemia: Stable. KDIGO guidelines recommend Atorvastatin 20mg and LDL is <70.     PLAN:                            Dr. Jackson consider...  1. Tapering off Duloxetine-> 30mg x 3 weeks, then D/C. Pt is not receiving benefit from this and it is not recommended for patients with ESRD.   2. In the future, a trial off Tolterodine. Pt's urinary symptoms have decreased as CKD has worsened. May not be necessary anymore.    Dr. Basilio...  1. Patient's BP has dropped substantially. She is at 108/65 today in our visit, had an episode of <100/50 over the weekend, although she denies hypotensive symptoms. She stopped Amlodipine and cut her Carvedilol dose in half last Saturday per direction of on call MD. We may want to consider further dose  reductions depending on where she levels out at. If she does need to continue BP medications she much prefers once daily formulations such as Amlodipine as they are easier to take. Let me know if you want to make any changes before I see her again next month.     Pt to...  1. Increase Ferrous Sulfate to BID after 1 week. Monitor stool frequency and hardness.   2. Keep doses 10-12 hours apart.   3. Start Culturelle daily.      I spent 70 minutes with this patient today. All changes were made via collaborative practice agreement with Noam Jackson. A copy of the visit note was provided to the patient's primary care provider.    Will follow up in 1 month.    The patient was given a summary of these recommendations as an after visit summary.     Brenden Blackwell, PharmD  [facility] Pharmacist    Phone: [phone]

## 2018-02-06 NOTE — PATIENT INSTRUCTIONS
Recommendations from today's MTM visit:                                                    MTM (medication therapy management) is a service provided by a clinical pharmacist designed to help you get the most of out of your medicines.     1.  Do your best to keep your twice daily medications 10-12 hours apart from each other. If you take them too close together you can have low blood pressures and other side effects.     2. I will send a message to Dr. Jackson about...   -going off of Duloxetine. It has not been effective for you and is typically not used in chronic kidney disease.   -taking a trial off Tolterodine     3. Start taking your Culturelle 1 capsule by mouth daily. See if this improves your bowel movement frequency.     4. I will talk with Dr. Basilio about changing your hypertension medications from twice daily to once daily formulations to simplify your dosing.     5. In one week increase your Ferrous sulfate dose to twice daily. Monitor for decreased bowel movement and stool hardness.      6. Your sodium goal is less than 2000 mg per day.     Next MTM visit: 3/2/18 at 1:30pm    To schedule another MTM appointment, please call the clinic directly or you may call the MTM scheduling line at [phone] or toll-free at [phone].     My Clinical Pharmacist's contact information:                                                      It was a pleasure seeing you today!  Please feel free to contact me with any questions or concerns you have.      Brenden Blackwell, PharmD  MTM Pharmacist     Phone: [phone]     You may receive a survey about the MTM services you received.  I would appreciate your feedback to help me serve you better in the future. Please fill it out and return it when you can. Your comments will be anonymous.

## 2018-02-06 NOTE — MR AVS SNAPSHOT
After Visit Summary   2/6/2018    Supriya Herr    MRN: [id number]           Patient Information     Date Of Birth          1950        Visit Information        Provider Department      2/6/2018 12:00 PM [facility]        Care Instructions    Recommendations from today's MTM visit:                                                    MTM (medication therapy management) is a service provided by a clinical pharmacist designed to help you get the most of out of your medicines.     1.  Do your best to keep your twice daily medications 10-12 hours apart from each other. If you take them too close together you can have low blood pressures and other side effects.     2. I will send a message to Dr. Jackson about...   -going off of Duloxetine. It has not been effective for you and is typically not used in chronic kidney disease.   -taking a trial off Tolterodine     3. Start taking your Culturelle 1 capsule by mouth daily. See if this improves your bowel movement frequency.     4. I will talk with Dr. Basilio about changing your hypertension medications from twice daily to once daily formulations to simplify your dosing.     5. In one week increase your Ferrous sulfate dose to twice daily. Monitor for decreased bowel movement and stool hardness.      6. Your sodium goal is less than 2000 mg per day.     Next MTM visit: 3/2/18 at 1:30pm    To schedule another MTM appointment, please call the clinic directly or you may call the MTM scheduling line at [phone] or toll-free at [phone].     My Clinical Pharmacist's contact information:                                                      It was a pleasure seeing you today!  Please feel free to contact me with any questions or concerns you have.      Brenden Blackwell, PharmD  MTM Pharmacist    Phone: [phone]     You may receive a survey about the MTM services you received.  I would  appreciate your feedback to help me serve you better in the future. Please fill it out and return it when you can. Your comments will be anonymous.              Follow-ups after your visit        Your next 10 appointments already scheduled     Feb 07, 2018  3:30 PM CST   LAB with  LAB    [facility] ([facility])    [address] [place] [id number] [phone]           Please do not eat 10-12 hours before your appointment if you are coming in fasting for labs on lipids, cholesterol, or glucose (sugar). This does not apply to pregnant women. Water, hot tea and black coffee (with nothing added) are okay. Do not drink other fluids, diet soda or chew gum.            Feb 07, 2018  4:00 PM CST   Nurse Visit with  NEPHROLOGY NURSE   [facility] Organ Transplant ([facility])    [address] [place] [address]  [address]  [facility] [address]   [phone]            Feb 16, 2018  3:00 PM CST   New Visit with Zari Thakkar LP   [facility] ([place] [address] [address]   [phone]            Feb 21, 2018 10:45 AM CST   Lab with  LAB    [facility] ([facility])    [address] [place] [address]   [phone]            Feb 21, 2018 11:35 AM CST   (Arrive by 11:05 AM)   Return Visit with Kathryn Basilio MD   [facility] Nephrology ([facility])    [address] [place] [address]  [address]  [facility] [address]   [phone]            Mar 02, 2018 11:30 AM CST   (Arrive by 11:15 AM)   RETURN DIABETES with Arabella Kamara PA-C   [facility] Endocrinology ([facility])    [address] [place] [address]  3rd [facility] [address]   [phone]            Mar 02, 2018 12:40 PM CST   (Arrive by 12:25 PM)   RETURN FOOT/ANKLE with Eleazar Pack  FRAN   [facility] ([facility] [place])    [address]  4th [address]  [facility] [id number] [phone]            Mar 02, 2018  1:30 PM CST   (Arrive by 1:15 PM)   Office Visit with Brenden Blackwell RPH   [facility] Medication Therapy Management ([facility])    [address]  3rd Floor  [facility] [id number] [phone]           Bring a current list of meds and any records pertaining to this visit. For Physicals, please bring immunization records and any forms needing to be filled out. Please arrive 10 minutes early to complete paperwork.            Mar 02, 2018  3:30 PM CST   Office Visit with Noam Jackson MD   [facility] ([facility])    [address] [place] [id number] [phone]           Bring a current list of meds and any records pertaining to this visit. For Physicals, please bring immunization records and any forms needing to be filled out. Please arrive 10 minutes early to complete paperwork.              Who to contact     If you have questions or need follow up information about today's clinic visit or your schedule please contact [facility] MEDICATION THERAPY MANAGEMENT directly at [phone].  Normal or non-critical lab and imaging results will be communicated to you by NextDigesthart, letter or phone within 4 business days after the clinic has received the results. If you do not hear from us within 7 days, please contact the clinic through [employer] or phone. If you have a critical or abnormal lab result, we will notify you by phone as soon as possible.  Submit refill requests through [employer] or call your pharmacy and they will forward the refill request to us. Please allow 3 business days for your refill to be completed.          Additional Information About Your Visit        BlueVox Information     [employer]Vox lets you send messages to your doctor,  "view your test results, renew your prescriptions, schedule appointments and more. To sign up, go to [email or web address] . Click on \"Log in\" on the left side of the screen, which will take you to the Welcome page. Then click on \"Sign up Now\" on the right side of the page.     You will be asked to enter the access code listed below, as well as some personal information. Please follow the directions to create your username and password.     Your access code is: [email or web address]  Expires: 3/13/2018  4:12 PM     Your access code will  in 90 days. If you need help or a new code, please call your [facility] clinic or [phone].        Care EveryWhere ID     This is your Care EveryWhere ID. This could be used by other organizations to access your [facility] medical records  [id number]         Blood Pressure from Last 3 Encounters:   18 117/53   18 150/62   18 (!) 118/28    Weight from Last 3 Encounters:   18 197 lb (89.4 kg)   18 199 lb 4.8 oz (90.4 kg)   18 199 lb 15.3 oz (90.7 kg)              Today, you had the following     No orders found for display         Today's Medication Changes          These changes are accurate as of 18  1:13 PM.  If you have any questions, ask your nurse or doctor.               These medicines have changed or have updated prescriptions.        Dose/Directions    carvedilol 25 MG tablet   Commonly known as:  COREG   This may have changed:  how much to take   Used for:  Essential hypertension with goal blood pressure less than 140/90        Dose:  25 mg   Take 1 tablet (25 mg) by mouth 2 times daily (with meals)   Quantity:  180 tablet   Refills:  3       ferrous sulfate 325 (65 FE) MG tablet   Commonly known as:  IRON   This may have changed:  when to take this   Used for:  Iron deficiency        Dose:  1 tablet   Take 1 tablet (325 mg) by mouth 2 times daily   Quantity:  180 tablet   Refills:  1         Stop taking these medicines if you haven't " already. Please contact your care team if you have questions.     acetaminophen 325 MG tablet   Commonly known as:  TYLENOL                    Primary Care Provider Office Phone # Fax #    Noam Luis Jackson [phone]       [address] [address]        Equal Access to Services     KALYANI WARREN : Hadii aad ku hadasho Soomaali, waaxda luqadaha, qaybta kaalmada adeegyada, waxay gusin haydarcyn adetere giangevercl chaudhari. So [facility] [phone].    ATENCIÓN: Si habla español, tiene a santoro disposición servicios gratuitos de asistencia lingüística. [facility] [phone].    We comply with applicable federal civil rights laws and [place] laws. We do not discriminate on the basis of race, color, national origin, age, disability, sex, sexual orientation, or gender identity.            Thank you!     Thank you for choosing [facility] MEDICATION THERAPY MANAGEMENT  for your care. Our goal is always to provide you with excellent care. Hearing back from our patients is one way we can continue to improve our services. Please take a few minutes to complete the written survey that you may receive in the mail after your visit with us. Thank you!             Your Updated Medication List - Protect others around you: Learn how to safely use, store and throw away your medicines at www.disposemymeds.org.          This list is accurate as of 2/6/18  1:13 PM.  Always use your most recent med list.                   Brand Name Dispense Instructions for use Diagnosis    albuterol 108 (90 BASE) MCG/ACT Inhaler    PROAIR HFA/PROVENTIL HFA/VENTOLIN HFA    3 Inhaler    Inhale 2 puffs into the lungs every 6 hours as needed for shortness of breath / dyspnea or wheezing    Cough       amLODIPine 5 MG tablet    NORVASC    30 tablet    Take 1 tablet (5 mg) by mouth daily    Type 2 diabetes mellitus with stage 3 chronic kidney disease, without long-term current use of insulin (H), Other fatigue       ASPIRIN NOT  PRESCRIBED    INTENTIONAL    0 each    1 each continuous prn Antiplatelet medication not prescribed intentionally due to current nosebleeds    Anemia due to blood loss, acute       atorvastatin 20 MG tablet    LIPITOR    90 tablet    Take 1 tablet (20 mg) by mouth daily    Hyperlipidemia LDL goal <100       * blood glucose monitoring test strip    ONETOUCH ULTRA    200 strip    Use to test blood sugars 2 times daily or as directed.    Type 2 diabetes mellitus with stage 3 chronic kidney disease, without long-term current use of insulin (H)       * blood glucose monitoring test strip    ONETOUCH ULTRA    200 strip    Test your blood sugar 3-4 times per day.    Type 2 diabetes mellitus with hyperglycemia, with long-term current use of insulin (H)       carvedilol 25 MG tablet    COREG    180 tablet    Take 1 tablet (25 mg) by mouth 2 times daily (with meals)    Essential hypertension with goal blood pressure less than 140/90       DULoxetine 30 MG EC capsule    CYMBALTA    180 capsule    Take 1 capsule (30 mg) by mouth 2 times daily    Type 2 diabetes mellitus with diabetic nephropathy, with long-term current use of insulin (H), Diabetic polyneuropathy associated with diabetes mellitus due to underlying condition (H)       ferrous sulfate 325 (65 FE) MG tablet    IRON    180 tablet    Take 1 tablet (325 mg) by mouth 2 times daily    Iron deficiency       furosemide 80 MG tablet    LASIX    90 tablet    Take 40 mg by mouth 2 times daily    Lymphedema of both lower extremities       insulin glargine 100 UNIT/ML injection    LANTUS    3 mL    Inject 20 Units Subcutaneous At Bedtime    Type 2 diabetes mellitus with diabetic neuropathy, with long-term current use of insulin (H)       insulin pen needle 31G X 6 MM    ULTICARE MINI    100 each    Use daily or as directed.    Type 2 diabetes, HbA1c goal < 7% (H)       lactobacillus rhamnosus (GG) capsule     60 capsule    Take 1 capsule by mouth 2 times daily    Cellulitis of  right leg       miconazole 2 % powder    MICATIN; MICRO GUARD    71 g    Apply topically 2 times daily    Fungal dermatitis       MULTIVITAMIN PO      1 tablet by mouth daily        NovoLOG FLEXPEN 100 UNIT/ML injection   Generic drug:  insulin aspart     15 mL    Inject 4 units SQ with breakfast, lunch and dinner.    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    Edema of lower extremity       tolterodine 2 MG 24 hr capsule    DETROL LA    60 capsule    TAKE 1 CAPSULE (2 MG) BY MOUTH DAILY    Urge incontinence of urine       Urea 20 % Crea cream     85 g    Apply topically daily    Xerosis cutis, Tyloma       * Notice:  This list has 6 medication(s) that are the same as other medications prescribed for you. Read the directions carefully, and ask your doctor or other care provider to review them with you.

## 2018-02-06 NOTE — PROGRESS NOTES
Noam Jackson MD Griffin, Peter Alberto, [facility]                     Sounds good, go ahead            Previous Messages       ----- Message -----      From: Brenden Blackwell RPH      Sent: 2/6/2018   2:40 PM        To: MD Dr. Renetta Araiza,     I saw your patient for a medication review and have a couple recommendations:     1. Consider tapering off Duloxetine-> 30mg x 3 weeks, then D/C. Pt does not believe she is receiving benefit from this and it is not recommended for patients with ESRD (CrCl <30ml/min)   2. In the future, we could consider a trial off Tolterodine. Pt's urinary symptoms have decreased as CKD has worsened. May not be necessary anymore. We can restart if needed.     Let me know what you think, I can make these changes for you.     Thank you!     Brenden Blackwell PharmD   [facility] Pharmacist     Phone: [phone]            Talked with Caroline , will start Duloxetine taper, and address Tolterodine D/C in the future.     Brenden Blackwell PharmD  MTM Pharmacist    Phone: [phone]

## 2018-02-06 NOTE — Clinical Note
Dr. Jackson,  I saw your patient for a medication review and have several recommendations:   1. Consider tapering off Duloxetine-> 30mg x 3 weeks, then D/C. Pt is not receiving benefit from this and it is not recommended for patients with ESRD.  2. In the future, consider a trial off Tolterodine. Pt's urinary symptoms have decreased as CKD has worsened. May not be necessary anymore. Can restart if needed.  Let me know what you think, I can make these changes for you.  Thank you!  Brenden Blackwell, PharmD [facility] Pharmacist  Phone: [phone]

## 2018-02-07 ENCOUNTER — TELEPHONE (OUTPATIENT)
Dept: FAMILY MEDICINE | Facility: CLINIC | Age: 68
End: 2018-02-07

## 2018-02-07 ENCOUNTER — ALLIED HEALTH/NURSE VISIT (OUTPATIENT)
Dept: TRANSPLANT | Facility: CLINIC | Age: 68
End: 2018-02-07
Attending: INTERNAL MEDICINE
Payer: MEDICARE

## 2018-02-07 DIAGNOSIS — N18.5 CKD (CHRONIC KIDNEY DISEASE) STAGE 5, GFR LESS THAN 15 ML/MIN (H): Primary | ICD-10-CM

## 2018-02-07 DIAGNOSIS — N18.5 CKD (CHRONIC KIDNEY DISEASE) STAGE 5, GFR LESS THAN 15 ML/MIN (H): ICD-10-CM

## 2018-02-07 LAB
ALBUMIN SERPL-MCNC: 2.6 G/DL (ref 3.4–5)
ANION GAP SERPL CALCULATED.3IONS-SCNC: 8 MMOL/L (ref 3–14)
BUN SERPL-MCNC: 86 MG/DL (ref 7–30)
CALCIUM SERPL-MCNC: 9 MG/DL (ref 8.5–10.1)
CHLORIDE SERPL-SCNC: 107 MMOL/L (ref 94–109)
CO2 SERPL-SCNC: 26 MMOL/L (ref 20–32)
CREAT SERPL-MCNC: 4.6 MG/DL (ref 0.52–1.04)
GFR SERPL CREATININE-BSD FRML MDRD: 9 ML/MIN/1.7M2
GLUCOSE SERPL-MCNC: 132 MG/DL (ref 70–99)
HGB BLD-MCNC: 9.5 G/DL (ref 11.7–15.7)
PHOSPHATE SERPL-MCNC: 4.6 MG/DL (ref 2.5–4.5)
POTASSIUM SERPL-SCNC: 5.2 MMOL/L (ref 3.4–5.3)
SODIUM SERPL-SCNC: 140 MMOL/L (ref 133–144)

## 2018-02-07 PROCEDURE — 80069 RENAL FUNCTION PANEL: CPT

## 2018-02-07 PROCEDURE — 85018 HEMOGLOBIN: CPT

## 2018-02-07 NOTE — TELEPHONE ENCOUNTER
Verbal ok for skilled home care given, Shelia WOMACK per protocol    Viridiana Sprague RN   [facility]

## 2018-02-07 NOTE — TELEPHONE ENCOUNTER
Reason for call:  Order   Order or referral being requested: Skilled nursing, 2 times for 1 week, 3 times for 1 week, 2 times for 2 week, 3 prns, PT and OT one time, declined  and home health aid  Reason for request: High blood pressure and diabetes management  Date needed: as soon as possible  Has the patient been seen by the PCP for this problem? YES    Additional comments: n/a    Phone number to reach patient:  Other phone number:  [phone]    Best Time:  n/a    Can we leave a detailed message on this number?  YES

## 2018-02-07 NOTE — MR AVS SNAPSHOT
After Visit Summary   2/7/2018    Supriya Herr    MRN: [id number]           Patient Information     Date Of Birth          1950        Visit Information        Provider Department      2/7/2018 4:00 PM  NEPHROLOGY NURSE [facility] Solid Organ Transplant        Today's Diagnoses     CKD (chronic kidney disease) stage 5, GFR less than 15 ml/min (H)    -  1       Follow-ups after your visit        Your next 10 appointments already scheduled     Feb 19, 2018  2:00 PM CST   US UPPER EXTREMITY VENOUS MAPPING BILATERAL with UCUSV2   [facility] US ([facility])    [address] [place] [address]  [address] [facility] [id number] [phone]           Please bring a list of your medicines (including vitamins, minerals and over-the-counter drugs). Also, tell your doctor about any allergies you may have. Wear comfortable clothes and leave your valuables at home.  You do not need to do anything special to prepare for your exam.  Please call the Imaging Department at your exam site with any questions.            Feb 19, 2018  3:15 PM CST   (Arrive by 3:00 PM)   Fistula Consult with Angelita Martin MD   [facility] Solid Organ Transplant ([facility])    [address] [place] [address]  [address]  [facility] [address]   [phone]            Feb 21, 2018 10:45 AM CST   Lab with  LAB   [facility] Lab ([facility])    [address] [place] [address]  [address] [facility] [address]   [phone]            Feb 21, 2018 11:35 AM CST   (Arrive by 11:05 AM)   Return Visit with Kathryn Basilio MD   [facility] Nephrology ([facility])    [address] [place] [address]  [address]  [facility] [address]   [phone]            Mar 02, 2018 11:30 AM CST   (Arrive by 11:15 AM)   RETURN DIABETES with Arabella Kamara PA-C   [facility] Endocrinology ([facility])    [address] [place] [address]  Floor  [facility] [address]   [phone]            Mar 02, 2018 12:40 PM CST   (Arrive by 12:25 PM)   RETURN FOOT/ANKLE with Eleazar Pack DPM   [facility] ([facility])    [address]  4th Floor  [facility] [address]   [phone]            Mar 02, 2018  1:30 PM CST   (Arrive by 1:15 PM)   Office Visit with Brenden Blackwell RPH   [facility] Medication Therapy Management ([facility])    [address]  3rd Floor  [facility] [address] [phone]           Bring a current list of meds and any records pertaining to this visit. For Physicals, please bring immunization records and any forms needing to be filled out. Please arrive 10 minutes early to complete paperwork.            Mar 02, 2018  3:30 PM CST   Office Visit with Noam Jackson MD   [facility] ([facility])    [address] [place] [id number] [phone]           Bring a current list of meds and any records pertaining to this visit. For Physicals, please bring immunization records and any forms needing to be filled out. Please arrive 10 minutes early to complete paperwork.              Who to contact     If you have questions or need follow up information about today's clinic visit or your schedule please contact [facility]GAN TRANSPLANT directly at [phone].  Normal or non-critical lab and imaging results will be communicated to you by MyChart, letter or phone within 4 business days after the clinic has received the results. If you do not hear from us within 7 days, please contact the clinic through MyChart or phone. If you have a critical or abnormal lab result, we will notify you by phone as soon as possible.  Submit refill requests through i[employer] or call your pharmacy and they will forward the refill request to us. Please allow 3 business days for your  "refill to be completed.          Additional Information About Your Visit        [email or web address] Information     [email or web address] lets you send messages to your doctor, view your test results, renew your prescriptions, schedule appointments and more. To sign up, go to [email or web address] . Click on \"Log in\" on the left side of the screen, which will take you to the Welcome page. Then click on \"Sign up Now\" on the right side of the page.     You will be asked to enter the access code listed below, as well as some personal information. Please follow the directions to create your username and password.     Your access code is: [email or web address]  Expires: 3/13/2018  4:12 PM     Your access code will  in 90 days. If you need help or a new code, please call your [facility] clinic or [phone].        Care EveryWhere ID     This is your Care EveryWhere ID. This could be used by other organizations to access your [facility] medical records  [id number]         Blood Pressure from Last 3 Encounters:   18 108/65   18 117/53   18 150/62    Weight from Last 3 Encounters:   18 89.4 kg (197 lb)   18 90.4 kg (199 lb 4.8 oz)   18 90.7 kg (199 lb 15.3 oz)              Today, you had the following     No orders found for display         Today's Medication Changes          These changes are accurate as of 18 11:59 PM.  If you have any questions, ask your nurse or doctor.               These medicines have changed or have updated prescriptions.        Dose/Directions    carvedilol 25 MG tablet   Commonly known as:  COREG   This may have changed:  how much to take   Used for:  Essential hypertension with goal blood pressure less than 140/90        Dose:  25 mg   Take 1 tablet (25 mg) by mouth 2 times daily (with meals)   Quantity:  180 tablet   Refills:  3       ferrous sulfate 325 (65 FE) MG tablet   Commonly known as:  IRON   This may have changed:  when to take this   Used for:  Iron deficiency        Dose: "  1 tablet   Take 1 tablet (325 mg) by mouth 2 times daily   Quantity:  180 tablet   Refills:  1                Primary Care Provider Office Phone # Fax #    Noam Luis Jackson [phone]       [address] [facility] [address]  [facility] [address]        Equal Access to Services     KALYANI WARREN : Hadii aad ku hadasho Soomaali, waaxda luqadaha, qaybta kaalmada adeegyada, waxcarla pruitt savannahheather galan adelacl sood . So [facility] [phone].    ATENCIÓN: Si habla español, tiene a santoro disposición servicios gratuitos de asistencia lingüística. [facility] [phone].    We comply with applicable federal civil rights laws and [place] laws. We do not discriminate on the basis of race, color, national origin, age, disability, sex, sexual orientation, or gender identity.            Thank you!     Thank you for choosing [facility] TRANSPL[facility]  for your care. Our goal is always to provide you with excellent care. Hearing back from our patients is one way we can continue to improve our services. Please take a few minutes to complete the written survey that you may receive in the mail after your visit with us. Thank you!             Your Updated Medication List - Protect others around you: Learn how to safely use, store and throw away your medicines at www.disposemymeds.org.          This list is accurate as of 2/7/18 11:59 PM.  Always use your most recent med list.                   Brand Name Dispense Instructions for use Diagnosis    albuterol 108 (90 BASE) MCG/ACT Inhaler    PROAIR HFA/PROVENTIL HFA/VENTOLIN HFA    3 Inhaler    Inhale 2 puffs into the lungs every 6 hours as needed for shortness of breath / dyspnea or wheezing    Cough       amLODIPine 5 MG tablet    NORVASC    30 tablet    Take 1 tablet (5 mg) by mouth daily    Type 2 diabetes mellitus with stage 3 chronic kidney disease, without long-term current use of insulin (H), Other fatigue       ASPIRIN NOT PRESCRIBED    INTENTIONAL    0 each    1  each continuous prn Antiplatelet medication not prescribed intentionally due to current nosebleeds    Anemia due to blood loss, acute       atorvastatin 20 MG tablet    LIPITOR    90 tablet    Take 1 tablet (20 mg) by mouth daily    Hyperlipidemia LDL goal <100       * blood glucose monitoring test strip    ONETOUCH ULTRA    200 strip    Use to test blood sugars 2 times daily or as directed.    Type 2 diabetes mellitus with stage 3 chronic kidney disease, without long-term current use of insulin (H)       * blood glucose monitoring test strip    ONETOUCH ULTRA    200 strip    Test your blood sugar 3-4 times per day.    Type 2 diabetes mellitus with hyperglycemia, with long-term current use of insulin (H)       carvedilol 25 MG tablet    COREG    180 tablet    Take 1 tablet (25 mg) by mouth 2 times daily (with meals)    Essential hypertension with goal blood pressure less than 140/90       DULoxetine 30 MG EC capsule    CYMBALTA    180 capsule    Take 1 capsule (30 mg) by mouth 2 times daily    Type 2 diabetes mellitus with diabetic nephropathy, with long-term current use of insulin (H), Diabetic polyneuropathy associated with diabetes mellitus due to underlying condition (H)       ferrous sulfate 325 (65 FE) MG tablet    IRON    180 tablet    Take 1 tablet (325 mg) by mouth 2 times daily    Iron deficiency       furosemide 80 MG tablet    LASIX    90 tablet    Take 40 mg by mouth 2 times daily    Lymphedema of both lower extremities       insulin glargine 100 UNIT/ML injection    LANTUS    3 mL    Inject 20 Units Subcutaneous At Bedtime    Type 2 diabetes mellitus with diabetic neuropathy, with long-term current use of insulin (H)       insulin pen needle 31G X 6 MM    ULTICARE MINI    100 each    Use daily or as directed.    Type 2 diabetes, HbA1c goal < 7% (H)       lactobacillus rhamnosus (GG) capsule     60 capsule    Take 1 capsule by mouth 2 times daily    Cellulitis of right leg       miconazole 2 % powder     MICATIN; MICRO GUARD    71 g    Apply topically 2 times daily    Fungal dermatitis       MULTIVITAMIN PO      1 tablet by mouth daily        NovoLOG FLEXPEN 100 UNIT/ML injection   Generic drug:  insulin aspart     15 mL    Inject 4 units SQ with breakfast, lunch and dinner.    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    Edema of lower extremity       tolterodine 2 MG 24 hr capsule    DETROL LA    60 capsule    TAKE 1 CAPSULE (2 MG) BY MOUTH DAILY    Urge incontinence of urine       Urea 20 % Crea cream     85 g    Apply topically daily    Xerosis cutis, Tyloma       * Notice:  This list has 6 medication(s) that are the same as other medications prescribed for you. Read the directions carefully, and ask your doctor or other care provider to review them with you.

## 2018-02-08 ENCOUNTER — CARE COORDINATION (OUTPATIENT)
Dept: NEPHROLOGY | Facility: CLINIC | Age: 68
End: 2018-02-08

## 2018-02-08 NOTE — PROGRESS NOTES
Reason for Call    Received call from patient's daughter, Caroline, to review labs from yesterday. Kidney function and potassium stable. Hgb improved to 9.5.     Patient and her daughter, Caroline, attended Kidney Smart class yesterday. Patient confirmed she would like to do in-center hemodialysis when the time comes. She is set up for vein mapping and access consult on 2/19. Caroline is worried about the timing of the fistula surgery and whether or not it will be ready in time for when the patient needs dialysis. I explained that it is difficult to predict exactly when Supriya will need dialysis, but patients do sometimes require starting HD with a temporary catheter if the fistula is not matured in time. Alternatively, discussed that surgeon may favor a graft, so at this time it's difficult to know the timing of everything until after the patient attends the surgical consult.     Caroline said patient is otherwise doing well. SBPs have been 120s-130s at home. She will let us know if this changes as her BP medications may need to be decreased further now that patient is following a low salt diet.     Patient's daughter, who is the primary care support, will be out of town next week. Patient's sister will be staying with her and they've also arranged for a home care nurse to see the patient 2-3 times next week.     Pravin Cordova RN

## 2018-02-08 NOTE — NURSING NOTE
Patient and her daughter attended Kidney Smart class for dialysis/ RRT education.    Pravin Cordova RN

## 2018-02-12 ENCOUNTER — DOCUMENTATION ONLY (OUTPATIENT)
Dept: CARE COORDINATION | Facility: CLINIC | Age: 68
End: 2018-02-12

## 2018-02-14 ENCOUNTER — TELEPHONE (OUTPATIENT)
Dept: FAMILY MEDICINE | Facility: CLINIC | Age: 68
End: 2018-02-14

## 2018-02-14 ENCOUNTER — CARE COORDINATION (OUTPATIENT)
Dept: NEPHROLOGY | Facility: CLINIC | Age: 68
End: 2018-02-14

## 2018-02-14 NOTE — TELEPHONE ENCOUNTER
Reason for call:  Order   Order or referral being requested: Physical Therapy   Reason for request: Physical Therapy Verbal Orders  Date needed: as soon as possible  Has the patient been seen by the PCP for this problem? YES    Additional comments: Demetria from [facility] called and was requesting verbal orders for Physical Therapy 2 times a week for 2 weeks and 1 time a week for 1 week    Phone number to reach patient:  Other phone number: [phone]    Best Time:  Any    Can we leave a detailed message on this number?  YES

## 2018-02-14 NOTE — TELEPHONE ENCOUNTER
Verbal ok for skilled home care visits left per VM with PT Lauren Sprague, SHANTA   [facility]

## 2018-02-14 NOTE — PROGRESS NOTES
Reason for Call    Called patient for CKD 5 follow up.  Says she is feeling well this week; no uremic symptoms or new conc.   BPs this week have been 127/66, 133/64, 141/66, 140/57, 115/60, 118/57, and 146/63.     Labs stable on last check. Will get repeat labs and see Dr. Basilio for appointment in 1 week. Patient will call sooner as needed.    Pravin Cordova, RN, BAN  Nephrology RN Care Coordinator

## 2018-02-19 ENCOUNTER — OFFICE VISIT (OUTPATIENT)
Dept: TRANSPLANT | Facility: CLINIC | Age: 68
End: 2018-02-19
Attending: SURGERY
Payer: MEDICARE

## 2018-02-19 ENCOUNTER — TELEPHONE (OUTPATIENT)
Dept: FAMILY MEDICINE | Facility: CLINIC | Age: 68
End: 2018-02-19

## 2018-02-19 ENCOUNTER — RADIANT APPOINTMENT (OUTPATIENT)
Dept: ULTRASOUND IMAGING | Facility: CLINIC | Age: 68
End: 2018-02-19
Attending: CLINICAL NURSE SPECIALIST
Payer: MEDICARE

## 2018-02-19 VITALS
SYSTOLIC BLOOD PRESSURE: 163 MMHG | DIASTOLIC BLOOD PRESSURE: 52 MMHG | WEIGHT: 191 LBS | OXYGEN SATURATION: 99 % | HEART RATE: 61 BPM | BODY MASS INDEX: 31.06 KG/M2

## 2018-02-19 DIAGNOSIS — N18.5 CKD (CHRONIC KIDNEY DISEASE) STAGE 5, GFR LESS THAN 15 ML/MIN (H): ICD-10-CM

## 2018-02-19 DIAGNOSIS — Z99.2 ENCOUNTER REGARDING VASCULAR ACCESS FOR DIALYSIS FOR ESRD (H): Primary | ICD-10-CM

## 2018-02-19 DIAGNOSIS — Z01.818 ENCOUNTER FOR OTHER PREPROCEDURAL EXAMINATION: ICD-10-CM

## 2018-02-19 DIAGNOSIS — Z45.2 ADJUSTMENT AND MANAGEMENT OF VASCULAR ACCESS DEVICE: ICD-10-CM

## 2018-02-19 DIAGNOSIS — N18.6 ENCOUNTER REGARDING VASCULAR ACCESS FOR DIALYSIS FOR ESRD (H): Primary | ICD-10-CM

## 2018-02-19 PROCEDURE — G0463 HOSPITAL OUTPT CLINIC VISIT: HCPCS | Mod: ZF

## 2018-02-19 ASSESSMENT — PAIN SCALES - GENERAL: PAINLEVEL: NO PAIN (0)

## 2018-02-19 NOTE — PROGRESS NOTES
.Dialysis Access Service  Consult Note    Referred by Dr. Kathryn Basilio for creation of permanent dialysis access.    HPI: Ms. Herr is being seen today for placement of permanent dialysis access due to Chronic renal failure from Type 2 Diabetes.  She is right handed. Ms. Herr is not dialyzing at (Not currently on dialysis).      Past Surgical History:   Procedure Laterality Date     EYE SURGERY  Feb 2012    Repair of hole in left retina     PHACOEMULSIFICATION WITH STANDARD INTRAOCULAR LENS IMPLANT  5/6/13    left     PHACOEMULSIFICATION WITH STANDARD INTRAOCULAR LENS IMPLANT  5/6/2013    Procedure: PHACOEMULSIFICATION WITH STANDARD INTRAOCULAR LENS IMPLANT;  Left Kelman Phacoemulsification with Intraocular Lens Implant;  Surgeon: Mat Valdes MD;  Location: [facility] OR     RELEASE TRIGGER FINGER  6/27/2014    Procedure: RELEASE TRIGGER FINGER;  Surgeon: Santi Pedraza MD;  Location: [facility] OR     SURGICAL HISTORY OF -   1989    amputation above left knee     SURGICAL HISTORY OF -   1989    right foot, open reduction and pinning     SURGICAL HISTORY OF -   1989    pinning right hip     SURGICAL HISTORY OF -   2006    colon screening declined       Risk factors for vascular access are:     Hx of CVC    []    Comment:   Hx of PICC line         []    Comment:   Hx of Pacemaker    []    Comment:   History of failed access:  []           SVC syndrome   []       Heart Failure    []    EF:    Periph arterial disease  [x]      Prior Fracture/Surgery  []    Location:   DVT    []    Location:  Diabetes   [x]     typ 2    Neuropathy   [x]    Comment:  Peripheral neuropathy  Anticoagulation:   []    Agent:      Anticoagulation contraindication: []    Details:                   Pediatric    []                           Hx of transplant   []   Comment:     Current immunosuppression []   Comment:                                  PHYSICAL EXAM:  Pulse:  [61] 61  BP: (163)/(52) 163/52  SpO2:  [99 %] 99 %  healthy,  alert, cooperative and smiling  EXTREMITY EXAM:   Vein Exam: Obvious suitable veins?    Left arm: YES   []           NO  [x]       Comment:    Right arm: YES   []           NO  [x]       Comment:   Arterial Exam:   Radial   L: 3+ R: 3+   Ulnar   L: 2+;R: 2+  Patent Palmar arch  L: YES   [x]  NO   []       R: YES   [x]   NO   []        Capillary refill:  L: <5 sec, R:<5sec    Sensory exam:   Left hand: Normal   []       Abnormal   [x]     Comment:  Diabetic nephropathy   Right hand: Normal   []       Abnormal   [x]     Comment:  Diabetic nephropathy    Motor exam normal:   Left hand: Normal   [x]       Abnormal   []     Comment:     Right hand: Normal   [x]       Abnormal   []     Comment:                       Mapping Reviewed:  Target vein:  left axillary vein  Target artery brachial artery at the distal upper arm    Assessment & Plan: Ms. Herr is a good candidate for placement of permanent dialysis access.  I would recommend left brachial artery to axillary vein AV bovine graft creation under General.     The surgical risks and benefits were reviewed and questions were answered. We discussed the day of surgery plan, anesthesia, postop care, risk of infection, numbness, injury to surrounding structures, bleeding, thrombosis, steal syndrome, possible need for future angioplasty or surgical revision, as well as nonmaturation or need for site abandonment. This was contrasted with morbidity and mortality risk of long-term catheter based hemodialysis access. The patient does wish to proceed with surgery for permanent access creation at this time. The patient was counselled to contact our nurse coordinator, PENELOPE Hollingsworth (Sum), CNS at [phone] with any questions or concerns.  Thank you for the opportunity to participate in Ms. Herr's care.    Please refer for kidney transplant evaluation. Daughter is interested to donate. Pre-emptive transplant would be favorable option.     Neena Woo  MD  Fellow,  Division of Transplantation  [phone]    I have reviewed history, examined patient and discussed plan with the fellow/resident/OLVIN.  I concur with the findings in this note.       Risks of the surgical procedure including but not limited to the rare risk of mortality discussed in detail. Patient verbalized good understanding and had several pertinent questions which were answered satisfactorily.          TT: 35 min, CT: 25 min.    .

## 2018-02-19 NOTE — TELEPHONE ENCOUNTER
Left message with Ivon OT supervisor. Orders given per protocol.    Ana Luisa Garcia RN,  [facility]

## 2018-02-19 NOTE — MR AVS SNAPSHOT
After Visit Summary   2/19/2018    Supriya Herr    MRN: [id number]           Patient Information     Date Of Birth          1950        Visit Information        Provider Department      2/19/2018 3:15 PM Angelita Martin MD [facility]        Today's Diagnoses     Encounter regarding vascular access for dialysis for ESRD (H)    -  1       Follow-ups after your visit        Your next 10 appointments already scheduled     Apr 03, 2018  5:40 PM CDT   (Arrive by 5:25 PM)   NEW FOOT/ANKLE with Alavro Gautam MD   [facility] ([facility])    [address]  4th Floor  [facility] [address] [phone]            Apr 11, 2018  9:45 AM CDT   Lab with  LAB   [facility] ([facility])    [address] [place] [address]  1st Floor  [facility] [id number] [phone]            Apr 11, 2018 10:45 AM CDT   (Arrive by 10:15 AM)   Return Visit with Kathryn Basilio MD   [facility] ([facility])    [address] [place] [address]  [address]  [facility] [id number] [phone]              Who to contact     If you have questions or need follow up information about today's clinic visit or your schedule please contact [facility]ANSPL[facility] directly at [phone].  Normal or non-critical lab and imaging results will be communicated to you by MyChart, letter or phone within 4 business days after the clinic has received the results. If you do not hear from us within 7 days, please contact the clinic through MyChart or phone. If you have a critical or abnormal lab result, we will notify you by phone as soon as possible.  Submit refill requests through [employer] or call your pharmacy and they will forward the refill request to us. Please allow 3 business days for your refill to be completed.          Additional Information About Your Visit        MyChart Information   "   [email or web address] lets you send messages to your doctor, view your test results, renew your prescriptions, schedule appointments and more. To sign up, go to [email or web address] . Click on \"Log in\" on the left side of the screen, which will take you to the Welcome page. Then click on \"Sign up Now\" on the right side of the page.     You will be asked to enter the access code listed below, as well as some personal information. Please follow the directions to create your username and password.     Your access code is: [email or web address]  Expires: 2018  4:04 PM     Your access code will  in 90 days. If you need help or a new code, please call your [facility] clinic or [phone].        Care EveryWhere ID     This is your Care EveryWhere ID. This could be used by other organizations to access your [facility] medical records  [id number]        Your Vitals Were     Pulse Pulse Oximetry BMI (Body Mass Index)             61 99% 31.06 kg/m2          Blood Pressure from Last 3 Encounters:   18 137/70   18 (P) 150/60   18 121/69    Weight from Last 3 Encounters:   18 86.6 kg (191 lb)   18 (P) 86.6 kg (191 lb)   18 86.6 kg (191 lb)              Today, you had the following     No orders found for display         Today's Medication Changes          These changes are accurate as of 18 11:59 PM.  If you have any questions, ask your nurse or doctor.               These medicines have changed or have updated prescriptions.        Dose/Directions    ferrous sulfate 325 (65 FE) MG tablet   Commonly known as:  IRON   This may have changed:  when to take this   Used for:  Iron deficiency        Dose:  1 tablet   Take 1 tablet (325 mg) by mouth 2 times daily   Quantity:  180 tablet   Refills:  1                Primary Care Provider Office Phone # Fax #    Noam Jackson [phone]       [address] [address] [address]        Equal Access to Services     " KALYANI WARREN : Hadii aad ku xin Lam, waaxda luqadaha, qaybta kaalmada ademaria alejandra, madeline sonal savannahheather galan adelacl sood . So [facility] [phone].    ATENCIÓN: Si habla español, tiene a santoro disposición servicios gratuitos de asistencia lingüística. Llame al [phone].    We comply with applicable federal civil rights laws and [place] laws. We do not discriminate on the basis of race, color, national origin, age, disability, sex, sexual orientation, or gender identity.            Thank you!     Thank you for choosing [facility]  for your care. Our goal is always to provide you with excellent care. Hearing back from our patients is one way we can continue to improve our services. Please take a few minutes to complete the written survey that you may receive in the mail after your visit with us. Thank you!             Your Updated Medication List - Protect others around you: Learn how to safely use, store and throw away your medicines at www.disposemymeds.org.          This list is accurate as of 2/19/18 11:59 PM.  Always use your most recent med list.                   Brand Name Dispense Instructions for use Diagnosis    albuterol 108 (90 BASE) MCG/ACT Inhaler    PROAIR HFA/PROVENTIL HFA/VENTOLIN HFA    3 Inhaler    Inhale 2 puffs into the lungs every 6 hours as needed for shortness of breath / dyspnea or wheezing    Cough       amLODIPine 5 MG tablet    NORVASC    30 tablet    Take 1 tablet (5 mg) by mouth daily    Type 2 diabetes mellitus with stage 3 chronic kidney disease, without long-term current use of insulin (H), Other fatigue       ASPIRIN NOT PRESCRIBED    INTENTIONAL    0 each    1 each continuous prn Antiplatelet medication not prescribed intentionally due to current nosebleeds    Anemia due to blood loss, acute       atorvastatin 20 MG tablet    LIPITOR    90 tablet    Take 1 tablet (20 mg) by mouth daily    Hyperlipidemia LDL goal <100       * blood glucose monitoring  test strip    ONETOUCH ULTRA    200 strip    Use to test blood sugars 2 times daily or as directed.    Type 2 diabetes mellitus with stage 3 chronic kidney disease, without long-term current use of insulin (H)       * blood glucose monitoring test strip    ONETOUCH ULTRA    200 strip    Test your blood sugar 3-4 times per day.    Type 2 diabetes mellitus with hyperglycemia, with long-term current use of insulin (H)       ferrous sulfate 325 (65 FE) MG tablet    IRON    180 tablet    Take 1 tablet (325 mg) by mouth 2 times daily    Iron deficiency       insulin glargine 100 UNIT/ML injection    LANTUS    3 mL    Inject 20 Units Subcutaneous At Bedtime    Type 2 diabetes mellitus with diabetic neuropathy, with long-term current use of insulin (H)       insulin pen needle 31G X 6 MM    ULTICARE MINI    100 each    Use daily or as directed.    Type 2 diabetes, HbA1c goal < 7% (H)       lactobacillus rhamnosus (GG) capsule     60 capsule    Take 1 capsule by mouth 2 times daily    Cellulitis of right leg       miconazole 2 % powder    MICATIN; MICRO GUARD    71 g    Apply topically 2 times daily    Fungal dermatitis       MULTIVITAMIN PO      1 tablet by mouth daily        NovoLOG FLEXPEN 100 UNIT/ML injection   Generic drug:  insulin aspart     15 mL    Inject 4 units SQ with breakfast, lunch and dinner.    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    Edema of lower extremity       tolterodine 2 MG 24 hr capsule    DETROL LA    60 capsule    TAKE 1 CAPSULE (2 MG) BY MOUTH DAILY    Urge incontinence of urine       Urea 20 % Crea cream     85 g    Apply topically daily    Xerosis cutis, Tyloma       * Notice:  This list has 6 medication(s) that are the same as other medications prescribed for you. Read the directions carefully, and ask your doctor or other care provider to review them with you.

## 2018-02-19 NOTE — NURSING NOTE
"Chief Complaint   Patient presents with     Consult     Consult for fistula        Initial /52  Pulse 61  Wt 86.6 kg (191 lb)  SpO2 99%  BMI 31.06 kg/m2 Estimated body mass index is 31.06 kg/(m^2) as calculated from the following:    Height as of 2/1/18: 1.67 m (5' 5.75\").    Weight as of this encounter: 86.6 kg (191 lb).  Medication Reconciliation: complete    "

## 2018-02-19 NOTE — LETTER
2/19/2018       RE: Supriya Herr  [address]  [facility] [address]     Dear Colleague,    Thank you for referring your patient, Supriya Herr, to the [facility] at [facility]. Please see a copy of my visit note below.    .Dialysis Access Service  Consult Note    Referred by Dr. Kathryn Basilio for creation of permanent dialysis access.    HPI: Ms. Herr is being seen today for placement of permanent dialysis access due to Chronic renal failure from Type 2 Diabetes.  She is right handed. Ms. Herr is not dialyzing at (Not currently on dialysis).      Past Surgical History:   Procedure Laterality Date     EYE SURGERY  Feb 2012    Repair of hole in left retina     PHACOEMULSIFICATION WITH STANDARD INTRAOCULAR LENS IMPLANT  5/6/13    left     PHACOEMULSIFICATION WITH STANDARD INTRAOCULAR LENS IMPLANT  5/6/2013    Procedure: PHACOEMULSIFICATION WITH STANDARD INTRAOCULAR LENS IMPLANT;  Left Kelman Phacoemulsification with Intraocular Lens Implant;  Surgeon: Mat Valdes MD;  Location: [facility] OR     RELEASE TRIGGER FINGER  6/27/2014    Procedure: RELEASE TRIGGER FINGER;  Surgeon: Santi Pedraza MD;  Location: [facility] OR     SURGICAL HISTORY OF -   1989    amputation above left knee     SURGICAL HISTORY OF -   1989    right foot, open reduction and pinning     SURGICAL HISTORY OF -   1989    pinning right hip     SURGICAL HISTORY OF -   2006    colon screening declined       Risk factors for vascular access are:     Hx of CVC    []    Comment:   Hx of PICC line         []    Comment:   Hx of Pacemaker    []    Comment:   History of failed access:  []           SVC syndrome   []       Heart Failure    []    EF:    Periph arterial disease  [x]      Prior Fracture/Surgery  []    Location:   DVT    []    Location:  Diabetes   [x]     typ 2    Neuropathy   [x]    Comment:  Peripheral neuropathy  Anticoagulation:   []    Agent:       Anticoagulation contraindication: []    Details:                   Pediatric    []                           Hx of transplant   []   Comment:     Current immunosuppression []   Comment:                                  PHYSICAL EXAM:  Pulse:  [61] 61  BP: (163)/(52) 163/52  SpO2:  [99 %] 99 %  healthy, alert, cooperative and smiling  EXTREMITY EXAM:   Vein Exam: Obvious suitable veins?    Left arm: YES   []           NO  [x]       Comment:    Right arm: YES   []           NO  [x]       Comment:   Arterial Exam:   Radial   L: 3+ R: 3+   Ulnar   L: 2+;R: 2+  Patent Palmar arch  L: YES   [x]  NO   []       R: YES   [x]   NO   []        Capillary refill:  L: <5 sec, R:<5sec    Sensory exam:   Left hand: Normal   []       Abnormal   [x]     Comment:  Diabetic nephropathy   Right hand: Normal   []       Abnormal   [x]     Comment:  Diabetic nephropathy    Motor exam normal:   Left hand: Normal   [x]       Abnormal   []     Comment:     Right hand: Normal   [x]       Abnormal   []     Comment:                       Mapping Reviewed:  Target vein:  left axillary vein  Target artery brachial artery at the distal upper arm    Assessment & Plan: Ms. Herr is a good candidate for placement of permanent dialysis access.  I would recommend left brachial artery to axillary vein AV bovine graft creation under General.     The surgical risks and benefits were reviewed and questions were answered. We discussed the day of surgery plan, anesthesia, postop care, risk of infection, numbness, injury to surrounding structures, bleeding, thrombosis, steal syndrome, possible need for future angioplasty or surgical revision, as well as nonmaturation or need for site abandonment. This was contrasted with morbidity and mortality risk of long-term catheter based hemodialysis access. The patient does wish to proceed with surgery for permanent access creation at this time. The patient was counselled to contact our nurse coordinator, Damaris  (Sum) PENELOPE Molina, CNS at [phone] with any questions or concerns.  Thank you for the opportunity to participate in Ms. Herr's care.    Please refer for kidney transplant evaluation. Daughter is interested to donate. Pre-emptive transplant would be favorable option.     Neena Woo MD  Fellow,  Division of Transplantation  [id number]    I have reviewed history, examined patient and discussed plan with the fellow/resident/OLVIN.  I concur with the findings in this note.       Risks of the surgical procedure including but not limited to the rare risk of mortality discussed in detail. Patient verbalized good understanding and had several pertinent questions which were answered satisfactorily.          TT: 35 min, CT: 25 min.    .

## 2018-02-19 NOTE — TELEPHONE ENCOUNTER
Reason for call:  Home Healthcare Reason for Call:  Home Health Care    Ivon with [facility] called regarding (reason for call): 1 additional OT visit    Orders are needed for this patient.     PT:     OT: 1 additional visit to follow up on wheelchair safety     Skilled Nursing:     Pt Provider: Dr. Jackson    Phone Number Homecare Nurse can be reached at: [phone]    Can we leave a detailed message on this number? YES    Phone number patient can be reached at: Home number on file [phone] (home)    Best Time: Any    Call taken on 2/19/2018 at 10:29 AM by Mariam Wasserman      Phone number to reach patient:  Home number on file [phone] (home)    Best Time:  Any    Can we leave a detailed message on this number?  YES

## 2018-02-20 DIAGNOSIS — N18.4 CHRONIC KIDNEY DISEASE, STAGE 4 (SEVERE) (H): Primary | Chronic | ICD-10-CM

## 2018-02-21 ENCOUNTER — OFFICE VISIT (OUTPATIENT)
Dept: NEPHROLOGY | Facility: CLINIC | Age: 68
End: 2018-02-21
Attending: INTERNAL MEDICINE
Payer: MEDICARE

## 2018-02-21 VITALS
WEIGHT: 191 LBS | DIASTOLIC BLOOD PRESSURE: 69 MMHG | HEART RATE: 60 BPM | RESPIRATION RATE: 18 BRPM | HEIGHT: 66 IN | SYSTOLIC BLOOD PRESSURE: 121 MMHG | BODY MASS INDEX: 30.7 KG/M2

## 2018-02-21 DIAGNOSIS — I10 ESSENTIAL HYPERTENSION WITH GOAL BLOOD PRESSURE LESS THAN 140/90: ICD-10-CM

## 2018-02-21 DIAGNOSIS — N18.4 CHRONIC KIDNEY DISEASE, STAGE 4 (SEVERE) (H): Chronic | ICD-10-CM

## 2018-02-21 DIAGNOSIS — R80.9 PROTEINURIA, UNSPECIFIED TYPE: Primary | ICD-10-CM

## 2018-02-21 DIAGNOSIS — I89.0 LYMPHEDEMA OF BOTH LOWER EXTREMITIES: ICD-10-CM

## 2018-02-21 DIAGNOSIS — E11.22 TYPE 2 DIABETES MELLITUS WITH STAGE 4 CHRONIC KIDNEY DISEASE, WITH LONG-TERM CURRENT USE OF INSULIN (H): ICD-10-CM

## 2018-02-21 DIAGNOSIS — N18.4 TYPE 2 DIABETES MELLITUS WITH STAGE 4 CHRONIC KIDNEY DISEASE, WITH LONG-TERM CURRENT USE OF INSULIN (H): ICD-10-CM

## 2018-02-21 DIAGNOSIS — Z79.4 TYPE 2 DIABETES MELLITUS WITH STAGE 4 CHRONIC KIDNEY DISEASE, WITH LONG-TERM CURRENT USE OF INSULIN (H): ICD-10-CM

## 2018-02-21 LAB
ALBUMIN SERPL-MCNC: 2.6 G/DL (ref 3.4–5)
ANION GAP SERPL CALCULATED.3IONS-SCNC: 8 MMOL/L (ref 3–14)
BUN SERPL-MCNC: 85 MG/DL (ref 7–30)
CALCIUM SERPL-MCNC: 9.6 MG/DL (ref 8.5–10.1)
CHLORIDE SERPL-SCNC: 107 MMOL/L (ref 94–109)
CO2 SERPL-SCNC: 27 MMOL/L (ref 20–32)
CREAT SERPL-MCNC: 4.17 MG/DL (ref 0.52–1.04)
DEPRECATED CALCIDIOL+CALCIFEROL SERPL-MC: 26 UG/L (ref 20–75)
GFR SERPL CREATININE-BSD FRML MDRD: 11 ML/MIN/1.7M2
GLUCOSE SERPL-MCNC: 61 MG/DL (ref 70–99)
HGB BLD-MCNC: 9.3 G/DL (ref 11.7–15.7)
PHOSPHATE SERPL-MCNC: 4.3 MG/DL (ref 2.5–4.5)
POTASSIUM SERPL-SCNC: 3.9 MMOL/L (ref 3.4–5.3)
PTH-INTACT SERPL-MCNC: 38 PG/ML (ref 18–80)
SODIUM SERPL-SCNC: 142 MMOL/L (ref 133–144)

## 2018-02-21 PROCEDURE — G0463 HOSPITAL OUTPT CLINIC VISIT: HCPCS | Mod: ZF

## 2018-02-21 PROCEDURE — 36415 COLL VENOUS BLD VENIPUNCTURE: CPT | Performed by: INTERNAL MEDICINE

## 2018-02-21 PROCEDURE — 83970 ASSAY OF PARATHORMONE: CPT | Performed by: INTERNAL MEDICINE

## 2018-02-21 PROCEDURE — 80069 RENAL FUNCTION PANEL: CPT | Performed by: INTERNAL MEDICINE

## 2018-02-21 PROCEDURE — 82306 VITAMIN D 25 HYDROXY: CPT | Performed by: INTERNAL MEDICINE

## 2018-02-21 PROCEDURE — 85018 HEMOGLOBIN: CPT | Performed by: INTERNAL MEDICINE

## 2018-02-21 RX ORDER — CARVEDILOL 12.5 MG/1
12.5 TABLET ORAL 2 TIMES DAILY WITH MEALS
Qty: 60 TABLET | Refills: 11 | Status: SHIPPED | OUTPATIENT
Start: 2018-02-21 | End: 2019-02-10

## 2018-02-21 RX ORDER — FUROSEMIDE 20 MG
20 TABLET ORAL AT BEDTIME
Qty: 30 TABLET | Refills: 3 | Status: SHIPPED | OUTPATIENT
Start: 2018-02-21 | End: 2018-06-07

## 2018-02-21 RX ORDER — FUROSEMIDE 40 MG
40 TABLET ORAL DAILY
Qty: 30 TABLET | Refills: 11 | Status: SHIPPED | OUTPATIENT
Start: 2018-02-21 | End: 2018-06-07

## 2018-02-21 ASSESSMENT — PAIN SCALES - GENERAL: PAINLEVEL: NO PAIN (0)
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
## 2018-02-21 NOTE — MR AVS SNAPSHOT
After Visit Summary   2/21/2018    Supriya Herr    MRN: [id number]           Patient Information     Date Of Birth          1950        Visit Information        Provider Department      2/21/2018 11:35 AM Kathryn Basilio MD [facility] Neph[facility]        Today's Diagnoses     Essential hypertension with goal blood pressure less than 140/90        Lymphedema of both lower extremities          Care Instructions    Blood pressure 125-135 top number          Follow-ups after your visit        Follow-up notes from your care team     Return in about 6 weeks (around 4/4/2018).      Your next 10 appointments already scheduled     Mar 02, 2018 11:30 AM CST   (Arrive by 11:15 AM)   RETURN DIABETES with Arabella Kamara PA-C   [facility] ([facility])    [address] [place] [address] [id number] [phone]            Mar 02, 2018 12:40 PM CST   (Arrive by 12:25 PM)   RETURN FOOT/ANKLE with Eleazar Pack DPM   [facility] ([facility])    [address] [place] [address] [id number] [phone]            Mar 02, 2018  1:30 PM CST   (Arrive by 1:15 PM)   Office Visit with Brenden Blackwell [facility] Therapy [facility] ([facility])    [address] [place] [address] [id number] [phone]           Bring a current list of meds and any records pertaining to this visit. For Physicals, please bring immunization records and any forms needing to be filled out. Please arrive 10 minutes early to complete paperwork.            Mar 02, 2018  3:30 PM CST   Office Visit with Noam Jackson MD   [facility]    [address] [place] [phone]           Bring a current list of meds and any records pertaining to this visit. For  "Physicals, please bring immunization records and any forms needing to be filled out. Please arrive 10 minutes early to complete paperwork.            Apr 11, 2018  9:45 AM CDT   Lab with [facility]   [facility] ([facility])    [address]  1st Floor  [facility] [address] [phone]            Apr 11, 2018 10:45 AM CDT   (Arrive by 10:15 AM)   Return Visit with Kathryn Basilio MD   [facility] ([facility])    [address]  [address]  [facility] [address] [phone]              Who to contact     If you have questions or need follow up information about today's clinic visit or your schedule please contact [facility] directly at [phone].  Normal or non-critical lab and imaging results will be communicated to you by Xplianthart, letter or phone within 4 business days after the clinic has received the results. If you do not hear from us within 7 days, please contact the clinic through Xplianthart or phone. If you have a critical or abnormal lab result, we will notify you by phone as soon as possible.  Submit refill requests through [employer] [email or web address]) or call your pharmacy and they will forward the refill request to us. Please allow 3 business days for your refill to be completed.          Additional Information About Your Visit        [employer] ([email or web address]) Information     [employer] ([email or web address]) lets you send messages to your doctor, view your test results, renew your prescriptions, schedule appointments and more. To sign up, go to [email or web address]) . Click on \"Log in\" on the left side of the screen, which will take you to the Welcome page. Then click on \"Sign up Now\" on the right side of the page.     You will be asked to enter the access code listed below, as well as some personal information. Please follow the directions to create your username and password.     Your access code is: [email or web address]  Expires: 3/13/2018  4:12 PM     Your access code " "will  in 90 days. If you need help or a new code, please call your [facility] clinic or [phone].        Care EveryWhere ID     This is your Care EveryWhere ID. This could be used by other organizations to access your [facility] medical records  [id number]        Your Vitals Were     Pulse Respirations Height BMI (Body Mass Index)          60 18 1.67 m (5' 5.75\") 31.06 kg/m2         Blood Pressure from Last 3 Encounters:   18 121/69   18 163/52   18 108/65    Weight from Last 3 Encounters:   18 86.6 kg (191 lb)   18 86.6 kg (191 lb)   18 89.4 kg (197 lb)              Today, you had the following     No orders found for display         Today's Medication Changes          These changes are accurate as of 18 12:29 PM.  If you have any questions, ask your nurse or doctor.               These medicines have changed or have updated prescriptions.        Dose/Directions    carvedilol 12.5 MG tablet   Commonly known as:  COREG   This may have changed:    - medication strength  - how much to take   Used for:  Essential hypertension with goal blood pressure less than 140/90   Changed by:  Kathryn Basilio MD        Dose:  12.5 mg   Take 1 tablet (12.5 mg) by mouth 2 times daily (with meals)   Quantity:  60 tablet   Refills:  11       ferrous sulfate 325 (65 FE) MG tablet   Commonly known as:  IRON   This may have changed:  when to take this   Used for:  Iron deficiency        Dose:  1 tablet   Take 1 tablet (325 mg) by mouth 2 times daily   Quantity:  180 tablet   Refills:  1       * furosemide 40 MG tablet   Commonly known as:  LASIX   This may have changed:    - medication strength  - when to take this   Used for:  Lymphedema of both lower extremities   Changed by:  Kathryn Basilio MD        Dose:  40 mg   Take 1 tablet (40 mg) by mouth daily   Quantity:  30 tablet   Refills:  11       * furosemide 20 MG tablet   Commonly known as:  LASIX   This may have " changed:  You were already taking a medication with the same name, and this prescription was added. Make sure you understand how and when to take each.   Used for:  Lymphedema of both lower extremities, Essential hypertension with goal blood pressure less than 140/90   Changed by:  Kathryn Basilio MD        Dose:  20 mg   Take 1 tablet (20 mg) by mouth At Bedtime   Quantity:  30 tablet   Refills:  3       * Notice:  This list has 2 medication(s) that are the same as other medications prescribed for you. Read the directions carefully, and ask your doctor or other care provider to review them with you.      Stop taking these medicines if you haven't already. Please contact your care team if you have questions.     amLODIPine 5 MG tablet   Commonly known as:  NORVASC   Stopped by:  Kathryn Basilio MD                Where to get your medicines      These medications were sent to [facility]/pharmacy #[id number] - [place], [place] - [address]  [address] [address]     Phone:  [phone]     carvedilol 12.5 MG tablet    furosemide 20 MG tablet    furosemide 40 MG tablet                Primary Care Provider Office Phone # Fax #    Noam Luis Jackson [phone]       [address]  [facility] [address]        Equal Access to Services     KALYANI WARREN AH: Hadii danisha jacobson hadasho Sorocali, waaxda luqadaha, qaybta kaalmada adeegyada, madeline chaudhari. So [facility] [phone].    ATENCIÓN: Si habla español, tiene a santoro disposición servicios gratuitos de asistencia lingüística. Llame al [phone].    We comply with applicable federal civil rights laws and [place] laws. We do not discriminate on the basis of race, color, national origin, age, disability, sex, sexual orientation, or gender identity.            Thank you!     Thank you for choosing [facility]  for your care. Our goal is always to provide you with excellent care.  Hearing back from our patients is one way we can continue to improve our services. Please take a few minutes to complete the written survey that you may receive in the mail after your visit with us. Thank you!             Your Updated Medication List - Protect others around you: Learn how to safely use, store and throw away your medicines at www.disposemymeds.org.          This list is accurate as of 2/21/18 12:29 PM.  Always use your most recent med list.                   Brand Name Dispense Instructions for use Diagnosis    albuterol 108 (90 BASE) MCG/ACT Inhaler    PROAIR HFA/PROVENTIL HFA/VENTOLIN HFA    3 Inhaler    Inhale 2 puffs into the lungs every 6 hours as needed for shortness of breath / dyspnea or wheezing    Cough       ASPIRIN NOT PRESCRIBED    INTENTIONAL    0 each    1 each continuous prn Antiplatelet medication not prescribed intentionally due to current nosebleeds    Anemia due to blood loss, acute       atorvastatin 20 MG tablet    LIPITOR    90 tablet    Take 1 tablet (20 mg) by mouth daily    Hyperlipidemia LDL goal <100       * blood glucose monitoring test strip    ONETOUCH ULTRA    200 strip    Use to test blood sugars 2 times daily or as directed.    Type 2 diabetes mellitus with stage 3 chronic kidney disease, without long-term current use of insulin (H)       * blood glucose monitoring test strip    ONETOUCH ULTRA    200 strip    Test your blood sugar 3-4 times per day.    Type 2 diabetes mellitus with hyperglycemia, with long-term current use of insulin (H)       carvedilol 12.5 MG tablet    COREG    60 tablet    Take 1 tablet (12.5 mg) by mouth 2 times daily (with meals)    Essential hypertension with goal blood pressure less than 140/90       DULoxetine 30 MG EC capsule    CYMBALTA    180 capsule    Take 1 capsule (30 mg) by mouth 2 times daily    Type 2 diabetes mellitus with diabetic nephropathy, with long-term current use of insulin (H), Diabetic polyneuropathy associated with  diabetes mellitus due to underlying condition (H)       ferrous sulfate 325 (65 FE) MG tablet    IRON    180 tablet    Take 1 tablet (325 mg) by mouth 2 times daily    Iron deficiency       * furosemide 40 MG tablet    LASIX    30 tablet    Take 1 tablet (40 mg) by mouth daily    Lymphedema of both lower extremities       * furosemide 20 MG tablet    LASIX    30 tablet    Take 1 tablet (20 mg) by mouth At Bedtime    Lymphedema of both lower extremities, Essential hypertension with goal blood pressure less than 140/90       insulin glargine 100 UNIT/ML injection    LANTUS    3 mL    Inject 20 Units Subcutaneous At Bedtime    Type 2 diabetes mellitus with diabetic neuropathy, with long-term current use of insulin (H)       insulin pen needle 31G X 6 MM    ULTICARE MINI    100 each    Use daily or as directed.    Type 2 diabetes, HbA1c goal < 7% (H)       lactobacillus rhamnosus (GG) capsule     60 capsule    Take 1 capsule by mouth 2 times daily    Cellulitis of right leg       miconazole 2 % powder    MICATIN; MICRO GUARD    71 g    Apply topically 2 times daily    Fungal dermatitis       MULTIVITAMIN PO      1 tablet by mouth daily        NovoLOG FLEXPEN 100 UNIT/ML injection   Generic drug:  insulin aspart     15 mL    Inject 4 units SQ with breakfast, lunch and dinner.    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    Edema of lower extremity       tolterodine 2 MG 24 hr capsule    DETROL LA    60 capsule    TAKE 1 CAPSULE (2 MG) BY MOUTH DAILY    Urge incontinence of urine       Urea 20 % Crea cream     85 g    Apply topically daily    Xerosis cutis, Tyloma       * Notice:  This list has 8 medication(s) that are the same as other medications prescribed for you. Read the directions carefully, and ask your doctor or other care provider to review them with you.
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
## 2018-02-21 NOTE — NURSING NOTE
"Chief Complaint   Patient presents with     RECHECK     10 wk follow up       Initial /69  Pulse 60  Resp 18  Ht 1.67 m (5' 5.75\")  Wt 86.6 kg (191 lb)  BMI 31.06 kg/m2 Estimated body mass index is 31.06 kg/(m^2) as calculated from the following:    Height as of this encounter: 1.67 m (5' 5.75\").    Weight as of this encounter: 86.6 kg (191 lb).  Medication Reconciliation: complete     SEVERINO WRIGHT CMA      "

## 2018-02-27 ENCOUNTER — TELEPHONE (OUTPATIENT)
Dept: TRANSPLANT | Facility: CLINIC | Age: 68
End: 2018-02-27

## 2018-02-27 DIAGNOSIS — Z76.82 ORGAN TRANSPLANT CANDIDATE: ICD-10-CM

## 2018-02-27 DIAGNOSIS — Z85.828 HISTORY OF SKIN CANCER: ICD-10-CM

## 2018-02-27 DIAGNOSIS — I10 ESSENTIAL HYPERTENSION: ICD-10-CM

## 2018-02-27 DIAGNOSIS — E11.9 DIABETES MELLITUS, TYPE 2 (H): ICD-10-CM

## 2018-02-27 DIAGNOSIS — Z87.891 HISTORY OF TOBACCO USE: ICD-10-CM

## 2018-02-27 DIAGNOSIS — N18.9 CHRONIC RENAL FAILURE: ICD-10-CM

## 2018-02-27 DIAGNOSIS — E78.5 HYPERLIPIDEMIA: ICD-10-CM

## 2018-02-27 DIAGNOSIS — G47.33 OBSTRUCTIVE SLEEP APNEA: ICD-10-CM

## 2018-02-27 NOTE — LETTER
Supriya Herr  [address]  [facility] [address]      Dear Supriya,    Thank you for your interest in the Transplant Center at [facility]. We look forward to being a part of your care team and assisting you through the transplant process.    As we discussed, your transplant coordinator is Mini Farmer (Kidney).  You may call your coordinator at any time with questions or concerns.  Your first scheduled call/tentaitive kidney evaluation date is on hold awaiting daughter call back.  If this needs to change and or scheduled, call [phone].    Please complete the following.    1. Fill out and return the enclosed forms    Authorization for Electronic Communication    Authorization to Discuss Protected Health Information    [employer] Release of Information    2. Sign up for:    [employer], access to your electronic medical record (see enclosed pamphlet)    [employer] a transplant education website (see enclosed booklet)    You can use these tools to learn more about your transplant, communicate with your care team, and track your medical details      Sincerely,      Solid Organ Transplant  [facility]    cc: Referring Physician, PCP

## 2018-03-02 ENCOUNTER — RADIANT APPOINTMENT (OUTPATIENT)
Dept: GENERAL RADIOLOGY | Facility: CLINIC | Age: 68
End: 2018-03-02
Attending: PHYSICIAN ASSISTANT
Payer: MEDICARE

## 2018-03-02 ENCOUNTER — OFFICE VISIT (OUTPATIENT)
Dept: ENDOCRINOLOGY | Facility: CLINIC | Age: 68
End: 2018-03-02
Payer: MEDICARE

## 2018-03-02 ENCOUNTER — OFFICE VISIT (OUTPATIENT)
Dept: PHARMACY | Facility: CLINIC | Age: 68
End: 2018-03-02
Payer: MEDICAID

## 2018-03-02 ENCOUNTER — OFFICE VISIT (OUTPATIENT)
Dept: ORTHOPEDICS | Facility: CLINIC | Age: 68
End: 2018-03-02
Payer: MEDICARE

## 2018-03-02 VITALS — OXYGEN SATURATION: 99 % | DIASTOLIC BLOOD PRESSURE: 70 MMHG | SYSTOLIC BLOOD PRESSURE: 137 MMHG | HEART RATE: 64 BPM

## 2018-03-02 VITALS — WEIGHT: 191 LBS | HEIGHT: 66 IN | BODY MASS INDEX: 30.7 KG/M2

## 2018-03-02 DIAGNOSIS — N18.4 TYPE 2 DIABETES MELLITUS WITH STAGE 4 CHRONIC KIDNEY DISEASE, WITH LONG-TERM CURRENT USE OF INSULIN (H): ICD-10-CM

## 2018-03-02 DIAGNOSIS — M79.671 RIGHT FOOT PAIN: ICD-10-CM

## 2018-03-02 DIAGNOSIS — N39.41 URGE INCONTINENCE OF URINE: ICD-10-CM

## 2018-03-02 DIAGNOSIS — I10 HYPERTENSION GOAL BP (BLOOD PRESSURE) < 140/90: Primary | ICD-10-CM

## 2018-03-02 DIAGNOSIS — E11.40 TYPE 2 DIABETES MELLITUS WITH DIABETIC NEUROPATHY, UNSPECIFIED LONG TERM INSULIN USE STATUS: ICD-10-CM

## 2018-03-02 DIAGNOSIS — N18.4 CHRONIC KIDNEY DISEASE, STAGE 4 (SEVERE) (H): ICD-10-CM

## 2018-03-02 DIAGNOSIS — Z79.4 TYPE 2 DIABETES MELLITUS WITH STAGE 4 CHRONIC KIDNEY DISEASE, WITH LONG-TERM CURRENT USE OF INSULIN (H): ICD-10-CM

## 2018-03-02 DIAGNOSIS — T84.498A: ICD-10-CM

## 2018-03-02 DIAGNOSIS — E78.5 HYPERLIPIDEMIA LDL GOAL <100: ICD-10-CM

## 2018-03-02 DIAGNOSIS — L84 TYLOMA: Primary | ICD-10-CM

## 2018-03-02 DIAGNOSIS — E11.22 TYPE 2 DIABETES MELLITUS WITH STAGE 4 CHRONIC KIDNEY DISEASE, WITH LONG-TERM CURRENT USE OF INSULIN (H): ICD-10-CM

## 2018-03-02 DIAGNOSIS — E11.40 TYPE 2 DIABETES MELLITUS WITH DIABETIC NEUROPATHY (H): ICD-10-CM

## 2018-03-02 DIAGNOSIS — B35.1 DERMATOPHYTOSIS OF NAIL: ICD-10-CM

## 2018-03-02 PROCEDURE — 99607 MTMS BY PHARM ADDL 15 MIN: CPT | Performed by: PHARMACIST

## 2018-03-02 PROCEDURE — 99606 MTMS BY PHARM EST 15 MIN: CPT | Performed by: PHARMACIST

## 2018-03-02 ASSESSMENT — PAIN SCALES - GENERAL: PAINLEVEL: NO PAIN (0)

## 2018-03-02 NOTE — MR AVS SNAPSHOT
After Visit Summary   3/2/2018    Supriya Herr    MRN: [id number]           Patient Information     Date Of Birth          1950        Visit Information        Provider Department      3/2/2018 11:30 AM Arabella Kamara PA-C [facility] Endocrinology        Today's Diagnoses     Type 2 diabetes, uncontrolled, with neuropathy (H)    -  1       Follow-ups after your visit        Your next 10 appointments already scheduled     Mar 02, 2018 12:40 PM CST   (Arrive by 12:25 PM)   RETURN FOOT/ANKLE with Eleazar Pack DPM   [facility] ([facility])    [address] [place] [address]  [address] [facility] [id number] [phone]            Mar 02, 2018  1:30 PM CST   (Arrive by 1:15 PM)   Office Visit with Brenden Blackwell [facility] Medication Therapy Management ([facility])    [address] [place] [address]  3rd Floor  [facility] [id number] [phone]           Bring a current list of meds and any records pertaining to this visit. For Physicals, please bring immunization records and any forms needing to be filled out. Please arrive 10 minutes early to complete paperwork.            Apr 11, 2018  9:45 AM CDT   Lab with  LAB   [facility] Lab ([facility])    [address] [place] [address]  1st Floor  [facility] [id number] [phone]            Apr 11, 2018 10:45 AM CDT   (Arrive by 10:15 AM)   Return Visit with Kathryn Basilio MD   [facility] Nephrology ([facility])    [address] [place] [address]  [address]  [facility] [id number] [phone]              Who to contact     Please call your clinic at [phone] to:    Ask questions about your health    Make or cancel appointments    Discuss your medicines    Learn about your test results    Speak to your doctor            Additional Information About Your Visit        MyChart Information     MyChart is an electronic  gateway that provides easy, online access to your medical records. With eYeka, you can request a clinic appointment, read your test results, renew a prescription or communicate with your care team.     To sign up for eYeka visit the website at [email or web address]   You will be asked to enter the access code listed below, as well as some personal information. Please follow the directions to create your username and password.     Your access code is: [email or web address]  Expires: 3/13/2018  4:12 PM     Your access code will  in 90 days. If you need help or a new code, please contact your [facility] or call [phone] for assistance.        Care EveryWhere ID     This is your Care EveryWhere ID. This could be used by other organizations to access your [facility] medical records  [id number]         Blood Pressure from Last 3 Encounters:   18 (P) 150/60   18 121/69   18 163/52    Weight from Last 3 Encounters:   18 (P) 86.6 kg (191 lb)   18 86.6 kg (191 lb)   18 86.6 kg (191 lb)              Today, you had the following     No orders found for display         Today's Medication Changes          These changes are accurate as of 3/2/18 12:20 PM.  If you have any questions, ask your nurse or doctor.               These medicines have changed or have updated prescriptions.        Dose/Directions    ferrous sulfate 325 (65 FE) MG tablet   Commonly known as:  IRON   This may have changed:  when to take this   Used for:  Iron deficiency        Dose:  1 tablet   Take 1 tablet (325 mg) by mouth 2 times daily   Quantity:  180 tablet   Refills:  1                Primary Care Provider Office Phone # Fax #    Noam Jackson [phone]       [address] [place], [place] [address] [address]        Equal Access to Services     KALYANI WARREN AH: Emeterio Lam, kristin crocker, madeline almodovar  gristere padmajakimberlee laOtisaaheather ah. So [facility] [phone].    ATENCIÓN: Si mariyala don, tiene a santoro disposición servicios gratuitos de asistencia lingüística. Renuka al [phone].    We comply with applicable federal civil rights laws and [place] laws. We do not discriminate on the basis of race, color, national origin, age, disability, sex, sexual orientation, or gender identity.            Thank you!     Thank you for choosing [facility] ENDOCRINOLOGY  for your care. Our goal is always to provide you with excellent care. Hearing back from our patients is one way we can continue to improve our services. Please take a few minutes to complete the written survey that you may receive in the mail after your visit with us. Thank you!             Your Updated Medication List - Protect others around you: Learn how to safely use, store and throw away your medicines at www.disposemymeds.org.          This list is accurate as of 3/2/18 12:20 PM.  Always use your most recent med list.                   Brand Name Dispense Instructions for use Diagnosis    albuterol 108 (90 BASE) MCG/ACT Inhaler    PROAIR HFA/PROVENTIL HFA/VENTOLIN HFA    3 Inhaler    Inhale 2 puffs into the lungs every 6 hours as needed for shortness of breath / dyspnea or wheezing    Cough       ASPIRIN NOT PRESCRIBED    INTENTIONAL    0 each    1 each continuous prn Antiplatelet medication not prescribed intentionally due to current nosebleeds    Anemia due to blood loss, acute       atorvastatin 20 MG tablet    LIPITOR    90 tablet    Take 1 tablet (20 mg) by mouth daily    Hyperlipidemia LDL goal <100       * blood glucose monitoring test strip    ONETOUCH ULTRA    200 strip    Use to test blood sugars 2 times daily or as directed.    Type 2 diabetes mellitus with stage 3 chronic kidney disease, without long-term current use of insulin (H)       * blood glucose monitoring test strip    ONETOUCH ULTRA    200 strip    Test your blood sugar 3-4 times per day.    Type 2  diabetes mellitus with hyperglycemia, with long-term current use of insulin (H)       carvedilol 12.5 MG tablet    COREG    60 tablet    Take 1 tablet (12.5 mg) by mouth 2 times daily (with meals)    Essential hypertension with goal blood pressure less than 140/90       DULoxetine 30 MG EC capsule    CYMBALTA    180 capsule    Take 1 capsule (30 mg) by mouth 2 times daily    Type 2 diabetes mellitus with diabetic nephropathy, with long-term current use of insulin (H), Diabetic polyneuropathy associated with diabetes mellitus due to underlying condition (H)       ferrous sulfate 325 (65 FE) MG tablet    IRON    180 tablet    Take 1 tablet (325 mg) by mouth 2 times daily    Iron deficiency       * furosemide 40 MG tablet    LASIX    30 tablet    Take 1 tablet (40 mg) by mouth daily    Lymphedema of both lower extremities       * furosemide 20 MG tablet    LASIX    30 tablet    Take 1 tablet (20 mg) by mouth At Bedtime    Lymphedema of both lower extremities, Essential hypertension with goal blood pressure less than 140/90       insulin glargine 100 UNIT/ML injection    LANTUS    3 mL    Inject 20 Units Subcutaneous At Bedtime    Type 2 diabetes mellitus with diabetic neuropathy, with long-term current use of insulin (H)       insulin pen needle 31G X 6 MM    ULTICARE MINI    100 each    Use daily or as directed.    Type 2 diabetes, HbA1c goal < 7% (H)       lactobacillus rhamnosus (GG) capsule     60 capsule    Take 1 capsule by mouth 2 times daily    Cellulitis of right leg       miconazole 2 % powder    MICATIN; MICRO GUARD    71 g    Apply topically 2 times daily    Fungal dermatitis       MULTIVITAMIN PO      1 tablet by mouth daily        NovoLOG FLEXPEN 100 UNIT/ML injection   Generic drug:  insulin aspart     15 mL    Inject 4 units SQ with breakfast, lunch and dinner.    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    Edema of lower extremity       tolterodine 2 MG 24 hr capsule    DETROL LA    60 capsule    TAKE 1 CAPSULE (2 MG) BY MOUTH DAILY    Urge incontinence of urine       Urea 20 % Crea cream     85 g    Apply topically daily    Xerosis cutis, Tyloma       * Notice:  This list has 8 medication(s) that are the same as other medications prescribed for you. Read the directions carefully, and ask your doctor or other care provider to review them with you.
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
## 2018-03-02 NOTE — PROGRESS NOTES
"Chief Complaint:   Chief Complaint   Patient presents with     RECHECK     10 week DM foot check.     Allergies   Allergen Reactions     Nkda [No Known Drug Allergies]      Subjective: Supriya is a 68 year old female who presents to the clinic today for diabetic foot evaluation. She would like nails and calluses trimmed today. Denies open sores, increased LE swelling, redness, numbness, tingling, burning, electric sensations, foot pain, rashes. She presents with her friend. They relate that they have had a difficult time scheduling the diabetic shoe molding. Notices a new lump on the side of her right big toe.    Objective  Ht 1.67 m (5' 5.75\")  Wt 86.6 kg (191 lb)  BMI 31.06 kg/m2    PT and DP pulses are non-palpable bilaterally. CRT is <3 seconds. Absent pedal hair. Mild non-pitting edema to RLE. Venous stasis discoloration to RLE.   Gross sensation intact to bilateral LEs.  Equinus present bilaterally. No pain with active or passive ROM of the ankle, MTJ, 1st ray, or halluces bilaterally. Left AKA. Severely laterally deviated second digit. Prominent first and second metatarsal heads. Limited active and passive ROM of first - fourth digits at MPJs. Hard, non-mobile nodule on medial right MPJ. Overlying skin is translucent and there appears to be a linear object, like a screw head, visible underneath. Nodule is painful with palpation.  Nails thickened, elongated and discolored with subungual debris bilaterally. No open lesions are noted. Skin is very dry. Hyperkeratotic tissue build up noted to plantar first and second metatarsal heads.  No open lesions noted.       Lab Results   Component Value Date    A1C 6.8 01/09/2018    A1C 9.6 06/09/2017    A1C 6.9 02/14/2017     right foot xrays indicated in 3 weightbearing views.    Partially threaded screw is noted and is backing out of the MPJ fusion. Fusion site is solid and united.       Assessment:   - Onychomycosis  - Tyloma  - Foot deformity  - DM type 2, " controlled  - Peripheral vascular disease  - MPJ screw backing out.       Plan:  - Pt seen and evaluated. Diagnosis and treatment options discussed.   - X-rays performed to evaluate new nodule. She is currently scheduled to see Dr. Gautam for consult about removal, the first week in April.   - Nails trimmed x 5. Calluses trimmed x 2  - I called the [facility] lab and Supriya has an appointment scheduled for Wednesday for shoe fitting.   - Continue compression stockings  - Encouraged continued daily moisturizing and foot checks.   - See again in 10 weeks.

## 2018-03-02 NOTE — LETTER
"3/2/2018       RE: Supriya Herr  [address]  [facility] [address]     Dear Colleague,    Thank you for referring your patient, Supriya Herr, to the [facility] at [facility]. Please see a copy of my visit note below.    Chief Complaint:   Chief Complaint   Patient presents with     RECHECK     10 week DM foot check.     Allergies   Allergen Reactions     Nkda [No Known Drug Allergies]      Subjective: Supriya is a 68 year old female who presents to the clinic today for diabetic foot evaluation. She would like nails and calluses trimmed today. Denies open sores, increased LE swelling, redness, numbness, tingling, burning, electric sensations, foot pain, rashes. She presents with her friend. They relate that they have had a difficult time scheduling the diabetic shoe molding. Notices a new lump on the side of her right big toe.    Objective  Ht 1.67 m (5' 5.75\")  Wt 86.6 kg (191 lb)  BMI 31.06 kg/m2    PT and DP pulses are non-palpable bilaterally. CRT is <3 seconds. Absent pedal hair. Mild non-pitting edema to RLE. Venous stasis discoloration to RLE.   Gross sensation intact to bilateral LEs.  Equinus present bilaterally. No pain with active or passive ROM of the ankle, MTJ, 1st ray, or halluces bilaterally. Left AKA. Severely laterally deviated second digit. Prominent first and second metatarsal heads. Limited active and passive ROM of first - fourth digits at MPJs. Hard, non-mobile nodule on medial right MPJ. Overlying skin is translucent and there appears to be a linear object, like a screw head, visible underneath. Nodule is painful with palpation.  Nails thickened, elongated and discolored with subungual debris bilaterally. No open lesions are noted. Skin is very dry. Hyperkeratotic tissue build up noted to plantar first and second metatarsal heads.  No open lesions noted.       Lab Results   Component Value Date    A1C 6.8 01/09/2018    A1C " 9.6 06/09/2017    A1C 6.9 02/14/2017     right foot xrays indicated in 3 weightbearing views.    Partially threaded screw is noted and is backing out of the MPJ fusion. Fusion site is solid and united.       Assessment:   - Onychomycosis  - Tyloma  - Foot deformity  - DM type 2, controlled  - Peripheral vascular disease  - MPJ screw backing out.       Plan:  - Pt seen and evaluated. Diagnosis and treatment options discussed.   - X-rays performed to evaluate new nodule. She is currently scheduled to see Dr. Gautam for consult about removal, the first week in April.   - Nails trimmed x 5. Calluses trimmed x 2  - I called the [facility] and Supriya has an appointment scheduled for Wednesday for shoe fitting.   - Continue compression stockings  - Encouraged continued daily moisturizing and foot checks.   - See again in 10 weeks.      Again, thank you for allowing me to participate in the care of your patient.      Sincerely,    Eleazar Pack DPM

## 2018-03-02 NOTE — MR AVS SNAPSHOT
After Visit Summary   3/2/2018    Supriya Herr    MRN: [id number]           Patient Information     Date Of Birth          1950        Visit Information        Provider Department      3/2/2018 1:30 PM Brenden Blackwell [facility] Medication Therapy Management        Care Instructions    Recommendations from today's MTM visit:                                                      1. Double check that you are taking Carvedilol 12.5mg twice daily at home.     2. Hold your Tolterodine ER 2mg. See if your urinary symptoms increase.     Next MTM visit: 4/11/18 at 11:30AM    To schedule another MTM appointment, please call the clinic directly or you may call the MTM scheduling line at [phone] or toll-free at [phone].     My Clinical Pharmacist's contact information:                                                      It was a pleasure seeing you today!  Please feel free to contact me with any questions or concerns you have.      Brenden Blackwell, Rani  MTM Pharmacist    Phone: [phone]      You may receive a survey about the MTM services you received.  I would appreciate your feedback to help me serve you better in the future. Please fill it out and return it when you can. Your comments will be anonymous.            Follow-ups after your visit        Your next 10 appointments already scheduled     Apr 11, 2018  9:45 AM CDT   Lab with  LAB   [facility] ([facility])    [address]  1st Floor  [facility] [address] [phone]            Apr 11, 2018 10:45 AM CDT   (Arrive by 10:15 AM)   Return Visit with Katrhyn Basilio MD   [facility] Nephrology ([facility])    [address]  [address]  [facility] [address] [phone]              Who to contact     If you have questions or need follow up information about today's clinic visit or your schedule please contact [facility] MEDICATION THERAPY  "MANAGEMENT directly at [phone].  Normal or non-critical lab and imaging results will be communicated to you by [employer]kLodgehart, letter or phone within 4 business days after the clinic has received the results. If you do not hear from us within 7 days, please contact the clinic through [employer]odge[employer] or phone. If you have a critical or abnormal lab result, we will notify you by phone as soon as possible.  Submit refill requests through [employer] or call your pharmacy and they will forward the refill request to us. Please allow 3 business days for your refill to be completed.          Additional Information About Your Visit        [employer]Works Information     [employer] lets you send messages to your doctor, view your test results, renew your prescriptions, schedule appointments and more. To sign up, go to [email or web address] . Click on \"Log in\" on the left side of the screen, which will take you to the Welcome page. Then click on \"Sign up Now\" on the right side of the page.     You will be asked to enter the access code listed below, as well as some personal information. Please follow the directions to create your username and password.     Your access code is: [email or web address]  Expires: 3/13/2018  4:12 PM     Your access code will  in 90 days. If you need help or a new code, please call your [place] clinic or [phone].        Care EveryWhere ID     This is your Care EveryWhere ID. This could be used by other organizations to access your [place] medical records  [id number]         Blood Pressure from Last 3 Encounters:   18 (P) 150/60   18 121/69   18 163/52    Weight from Last 3 Encounters:   18 191 lb (86.6 kg)   18 (P) 191 lb (86.6 kg)   18 191 lb (86.6 kg)              Today, you had the following     No orders found for display         Today's Medication Changes          These changes are accurate as of 3/2/18  1:51 PM.  If you have any questions, ask your nurse or doctor.          "      These medicines have changed or have updated prescriptions.        Dose/Directions    ferrous sulfate 325 (65 FE) MG tablet   Commonly known as:  IRON   This may have changed:  when to take this   Used for:  Iron deficiency        Dose:  1 tablet   Take 1 tablet (325 mg) by mouth 2 times daily   Quantity:  180 tablet   Refills:  1                Primary Care Provider Office Phone # Fax #    Noam Luis Jackson [phone]       [address] [facility] [address]  [facility] [address]        Equal Access to Services     KALYANI WARREN : Hadii aad ku hadasho Soomaali, waaxda luqadaha, qaybta kaalmada adeegyada, waxay gusin hayamy sood . So [facility] [phone].    ATENCIÓN: Si habla español, tiene a santoro disposición servicios gratuitos de asistencia lingüística. [facility] [phone].    We comply with applicable federal civil rights laws and [place] laws. We do not discriminate on the basis of race, color, national origin, age, disability, sex, sexual orientation, or gender identity.            Thank you!     Thank you for choosing [facility] MEDICATION THERAPY MANAGEMENT  for your care. Our goal is always to provide you with excellent care. Hearing back from our patients is one way we can continue to improve our services. Please take a few minutes to complete the written survey that you may receive in the mail after your visit with us. Thank you!             Your Updated Medication List - Protect others around you: Learn how to safely use, store and throw away your medicines at www.disposemymeds.org.          This list is accurate as of 3/2/18  1:51 PM.  Always use your most recent med list.                   Brand Name Dispense Instructions for use Diagnosis    albuterol 108 (90 BASE) MCG/ACT Inhaler    PROAIR HFA/PROVENTIL HFA/VENTOLIN HFA    3 Inhaler    Inhale 2 puffs into the lungs every 6 hours as needed for shortness of breath / dyspnea or wheezing    Cough       ASPIRIN NOT  PRESCRIBED    INTENTIONAL    0 each    1 each continuous prn Antiplatelet medication not prescribed intentionally due to current nosebleeds    Anemia due to blood loss, acute       atorvastatin 20 MG tablet    LIPITOR    90 tablet    Take 1 tablet (20 mg) by mouth daily    Hyperlipidemia LDL goal <100       * blood glucose monitoring test strip    ONETOUCH ULTRA    200 strip    Use to test blood sugars 2 times daily or as directed.    Type 2 diabetes mellitus with stage 3 chronic kidney disease, without long-term current use of insulin (H)       * blood glucose monitoring test strip    ONETOUCH ULTRA    200 strip    Test your blood sugar 3-4 times per day.    Type 2 diabetes mellitus with hyperglycemia, with long-term current use of insulin (H)       carvedilol 12.5 MG tablet    COREG    60 tablet    Take 1 tablet (12.5 mg) by mouth 2 times daily (with meals)    Essential hypertension with goal blood pressure less than 140/90       DULoxetine 30 MG EC capsule    CYMBALTA    180 capsule    Take 1 capsule (30 mg) by mouth 2 times daily    Type 2 diabetes mellitus with diabetic nephropathy, with long-term current use of insulin (H), Diabetic polyneuropathy associated with diabetes mellitus due to underlying condition (H)       ferrous sulfate 325 (65 FE) MG tablet    IRON    180 tablet    Take 1 tablet (325 mg) by mouth 2 times daily    Iron deficiency       * furosemide 40 MG tablet    LASIX    30 tablet    Take 1 tablet (40 mg) by mouth daily    Lymphedema of both lower extremities       * furosemide 20 MG tablet    LASIX    30 tablet    Take 1 tablet (20 mg) by mouth At Bedtime    Lymphedema of both lower extremities, Essential hypertension with goal blood pressure less than 140/90       insulin glargine 100 UNIT/ML injection    LANTUS    3 mL    Inject 20 Units Subcutaneous At Bedtime    Type 2 diabetes mellitus with diabetic neuropathy, with long-term current use of insulin (H)       insulin pen needle 31G X 6 MM     ULTICARE MINI    100 each    Use daily or as directed.    Type 2 diabetes, HbA1c goal < 7% (H)       lactobacillus rhamnosus (GG) capsule     60 capsule    Take 1 capsule by mouth 2 times daily    Cellulitis of right leg       miconazole 2 % powder    MICATIN; MICRO GUARD    71 g    Apply topically 2 times daily    Fungal dermatitis       MULTIVITAMIN PO      1 tablet by mouth daily        NovoLOG FLEXPEN 100 UNIT/ML injection   Generic drug:  insulin aspart     15 mL    Inject 4 units SQ with breakfast, lunch and dinner.    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    Edema of lower extremity       tolterodine 2 MG 24 hr capsule    DETROL LA    60 capsule    TAKE 1 CAPSULE (2 MG) BY MOUTH DAILY    Urge incontinence of urine       Urea 20 % Crea cream     85 g    Apply topically daily    Xerosis cutis, Tyloma       * Notice:  This list has 8 medication(s) that are the same as other medications prescribed for you. Read the directions carefully, and ask your doctor or other care provider to review them with you.

## 2018-03-02 NOTE — NURSING NOTE
"Chief Complaint   Patient presents with     RECHECK       Initial There were no vitals taken for this visit. Estimated body mass index is 31.06 kg/(m^2) as calculated from the following:    Height as of 2/21/18: 1.67 m (5' 5.75\").    Weight as of 2/21/18: 86.6 kg (191 lb).  Medication Reconciliation: complete    "

## 2018-03-02 NOTE — NURSING NOTE
"Reason For Visit:   Chief Complaint   Patient presents with     RECHECK     10 week DM foot check. Pt has two spots that are causing pain. Would like information on DM shoes.       Primary MD: Noam Jackson    Ht 1.67 m (5' 5.75\")  Wt 86.6 kg (191 lb)  BMI 31.06 kg/m2       [facility]  [facility]Y #[id number] [address]    Allergies   Allergen Reactions     Nkda [No Known Drug Allergies]        Current Outpatient Prescriptions   Medication     furosemide (LASIX) 40 MG tablet     furosemide (LASIX) 20 MG tablet     ferrous sulfate (IRON) 325 (65 FE) MG tablet     Urea 20 % CREA cream     NOVOLOG FLEXPEN 100 UNIT/ML soln     order for DME     blood glucose monitoring (ONETOUCH ULTRA) test strip     insulin glargine (LANTUS) 100 UNIT/ML injection     lactobacillus rhamnosus, GG, (CULTURELL) capsule     miconazole (MICATIN; MICRO GUARD) 2 % powder     order for DME     tolterodine (DETROL LA) 2 MG 24 hr capsule     DULoxetine (CYMBALTA) 30 MG EC capsule     blood glucose monitoring (ONE TOUCH ULTRA) test strip     atorvastatin (LIPITOR) 20 MG tablet     order for DME     albuterol (PROAIR HFA, PROVENTIL HFA, VENTOLIN HFA) 108 (90 BASE) MCG/ACT inhaler     insulin pen needle (ULTICARE MINI) 31G X 6 MM     ORDER FOR DME     ASPIRIN NOT PRESCRIBED, INTENTIONAL,     MULTIVITAMIN OR     carvedilol (COREG) 12.5 MG tablet     No current facility-administered medications for this visit.        Pain Assessment  Patient Currently in Pain: Yes  Patient's Stated Pain Goal:  (with pressure)  0-10 Pain Scale: 2  Pain Descriptors: Ronald Hamilton, SHERI    "

## 2018-03-02 NOTE — PROGRESS NOTES
SUBJECTIVE/OBJECTIVE:                           Supriya eHrr is a 68 year old female coming in for a follow-up visit for Medication Therapy Management. She was referred to me from Dr. Basilio. Her sister was present throughout our visit today.    Chief Complaint: Follow up from last visit on 2/6/2018 to check on blood pressure management, side effects following duloxetine discontinuation and increase frequency of ferrous sulfate supplement.      Recent Hospitalizations: Hyperkalemia 1/2018, Cellulitis in 11/ 2017.    Allergies/ADRs: None  Tobacco: No tobacco use(quit following November hospital stay)  Alcohol: not currently using  Caffeine: <1 cups/day of tea  Activity: chooses not exercises. Watching   PMH: Reviewed in Epic    Medication Adherence/Access  The patient misses their medication 0-1 times per week. Patient uses a Pill box, which her daughter sets up for her. Memory and adherence has improved in the past month or so, right after hospitalization in November she was having issues. Her daughter sets up her medications, and has not been noticing missed doses for the past month. Patient takes AM medications between 9-10am and evening doses at between 6-9 pm. She states she has been trying to keep her doses as close to 12 hours apart as possible.     Hypertension: Current medications include stopped Carvedilol 12.5mg BID, Furosemide 40 qAM, 20mg qPM.  Patient does self-monitor BP. Home BP monitoring since dose change  147/73 this morning at home today. Patient reports the following medication side effects: history of edema, none today. Pt was eating a lot of salt, prior to last month's hospitalization. Her daughter has recently started cooking everything from scratch with no salt. Pt states dietary changes has been a big adjustment, but she is working hard at it.    Neuropathy: Pt tapered off from Duloxetine 30 mg since our last visit without withdrawal. Her hands go numb in the morning, but improves  throughout the day. She has tried Gabapentin in the past. This may have made her more tired, but since she was already fatigued due to the 11/17 hospitalization it is difficult to differentiate.     Urinary frequency/ Urgency: Pt is taking Tolterodine 2mg ER Daily. Patient's urination frequency and urgency has decreased lately, either due to dose decrease of Furosemide or progression of CKD.     Hyperlipidemia: Current therapy includes Atorvastatin 20mg once daily.  Pt reports no significant myalgias or other side effects.   The 10-year ASCVD risk score (Maxie SOCORRO Jr, et al., 2013) is: 27.3%    Values used to calculate the score:      Age: 68 years      Sex: Female      Is Non- : No      Diabetic: Yes      Tobacco smoker: No      Systolic Blood Pressure: 150 mmHg      Is BP treated: Yes      HDL Cholesterol: 46 mg/dL      Total Cholesterol: 172 mg/dL    CKD: Pt has been tolerating Ferrous Sulfate 325mg BID. She reports it turned her stools black, but they are not hard. She reports having normal daily BMs. She also continues to increase her daily water intake to stay hydrated. Calculated CrCL (based off of sCr of 4.17) is 14 mL/min (using adj body weight).     Current labs include:  BP Readings from Last 3 Encounters:   03/02/18 137/70   03/02/18 (P) 150/60   02/21/18 121/69     Today's Vitals: /70  Pulse 64  SpO2 99%  Lab Results   Component Value Date    A1C 6.8 01/09/2018   .  Lab Results   Component Value Date    CHOL 172 09/21/2017     Lab Results   Component Value Date    TRIG 288 09/21/2017     Lab Results   Component Value Date    HDL 46 09/21/2017     Lab Results   Component Value Date    LDL 68 09/21/2017       Liver Function Studies -   Recent Labs   Lab Test  01/31/18   0934   11/13/17   0715   PROTTOTAL   --    --   7.1   ALBUMIN  2.6*   < >  1.8*   BILITOTAL   --    --   0.3   ALKPHOS   --    --   70   AST   --    --   30   ALT   --    --   37    < > = values in this  interval not displayed.       Lab Results   Component Value Date    UCRR 48 01/09/2018    MICROL 2880 01/09/2018    UMALCR 6037.74 (H) 01/09/2018       Last Basic Metabolic Panel:  Lab Results   Component Value Date     02/01/2018      Lab Results   Component Value Date    POTASSIUM 5.1 02/01/2018     Lab Results   Component Value Date    CHLORIDE 111 02/01/2018     Lab Results   Component Value Date    BUN 81 02/01/2018     Lab Results   Component Value Date    CR 4.26 02/01/2018     GFR Estimate   Date Value Ref Range Status   02/21/2018 11 (L) >60 mL/min/1.7m2 Final     Comment:     Non  GFR Calc   02/07/2018 9 (L) >60 mL/min/1.7m2 Final     Comment:     Non  GFR Calc   02/01/2018 10 (L) >60 mL/min/1.7m2 Final     Comment:     Non  GFR Calc     TSH   Date Value Ref Range Status   01/09/2018 2.90 0.40 - 4.00 mU/L Final     Most Recent Immunizations   Administered Date(s) Administered     Influenza (High Dose) 3 valent vaccine 09/21/2017     Influenza (IIV3) PF 10/25/2006     Influenza Vaccine IM 3yrs+ 4 Valent IIV4 01/21/2016     Influenza Vaccine, 3 YRS +, IM (QUADRIVALENT W/PRESERVATIVES) 11/21/2016     Pneumo Conj 13-V (2010&after) 08/13/2015     Pneumococcal 23 valent 08/27/2012     TD (ADULT, 7+) 02/08/2006     TDAP Vaccine (Adacel) 07/18/2013     Zoster vaccine, live 08/13/2015       ASSESSMENT:                             Current medications were reviewed today.     Medication Adherence/Access: Although her medication list says to take Carvedilol 12.5 mg BID, patient unsure if she is getting that evening dose. Counseled patient to take Carvedilol 12.5 mg twice daily for blood pressure, patient confirms she will double check this at home today.    Hypertension: Improving. Patient is at goal of <140/80 mmHg (per JNC 8 Hypertension guidelines). Per the new ACC/AHA 2017 HTN guidelines goal BP of <130/80 mmHg may be considered at future visits. Given her  recent hospitalization with low blood pressure levels and medication changes, recommend continuing current therapy regimen and assessing at next visit. Patient will continue monitoring at home blood pressures and bring BP machine to next in-clinic visit. There is some concern that patient may not be taking Carvedilol 12.5mg BID, pt will check on that.     Neuropathy: Stable.     Urinary frequency/ Urgency: Needs Improvement. Given patient is not experiencing any symptoms of increased urinary frequency, patient may benefit from tapering off of Tolterodine 2 mg daily and assessing urinary frequency.     Hyperlipidemia: Needs Improvement. Per ACC/AHA guidelines, patient is indicated for high-intensity statin therapy. Due to other medication changes at this visit, will consider increasing daily Atorvastatin from 20 mg (moderate-intensity) to 40 mg (high-intensity) daily at next visit.    CKD: Stable. Continue low sodium diet intake (goal <2000 mg/day) and ferrous sulfate 325 mg BID.     PLAN:                            Pt to...  1. Hold Tolrerodine ER 2 mg dose and monitor increase in urinary symptoms.     Future Considerations...  1. Consider increasing Atorvastatin 20 mg to 40 mg daily.   2. Pending BP readings, consider re-starting Amlodipine or dose increase of Carvedilol to manage hypertension.     I spent 45 minutes with this patient today. All changes were made via collaborative practice agreement with Noam Jackson. A copy of the visit note was provided to the patient's primary care provider.    Will follow up in 1 month on 4/11/18.    The patient was given a summary of these recommendations as an after visit summary.     Thanks,   Arpita Walsh, PharmD Student    Brenden Blackwell, PharmD  [facility] Pharmacist    Phone: [phone]

## 2018-03-02 NOTE — PATIENT INSTRUCTIONS
Recommendations from today's MTM visit:                                                      1. Double check that you are taking Carvedilol 12.5mg twice daily at home.     2. Hold your Tolterodine ER 2mg. See if your urinary symptoms increase.     Next MTM visit: 4/11/18 at 11:30AM    To schedule another MTM appointment, please call the clinic directly or you may call the MTM scheduling line at [phone] or toll-free at [phone].     My Clinical Pharmacist's contact information:                                                      It was a pleasure seeing you today!  Please feel free to contact me with any questions or concerns you have.      Brenden Blackwell, PharmD  MTM Pharmacist    Phone: [phone]      You may receive a survey about the MTM services you received.  I would appreciate your feedback to help me serve you better in the future. Please fill it out and return it when you can. Your comments will be anonymous.
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
## 2018-03-02 NOTE — MR AVS SNAPSHOT
After Visit Summary   3/2/2018    Supriya Herr    MRN: [id number]           Patient Information     Date Of Birth          1950        Visit Information        Provider Department      3/2/2018 12:40 PM Eleazar Pack DPM [facility]        Today's Diagnoses     Tyloma    -  1    Dermatophytosis of nail        Type 2 diabetes mellitus with stage 4 chronic kidney disease, with long-term current use of insulin (H)        Right foot pain        Mechanical complication of internal fixation device such as nail, plate or clementina, initial encounter (H)           Follow-ups after your visit        Additional Services     ORTHOPEDICS ADULT REFERRAL       Your provider has referred you to: [facility]: Orthopaedic Clinic [facility] [phone]   [email or web address]      Dr. Gautam    Please be aware that coverage of these services is subject to the terms and limitations of your health insurance plan.  Call member services at your health plan with any benefit or coverage questions.      Please bring the following to your appointment:    >>   Any x-rays, CTs or MRIs which have been performed.  Contact the facility where they were done to arrange for  prior to your scheduled appointment.    >>   List of current medications   >>   This referral request   >>   Any documents/labs given to you for this referral            ORTHOTICS REFERRAL (Foot and Ankle)       Please be aware that coverage of these services is subject to the terms and limitations of your health insurance plan.  Call member services at your health plan with any benefit or coverage questions.      Please bring the following to your appointment:    >>   Any x-rays, CTs or MRIs which have been performed.  Contact the facility where they were done to arrange for  prior to your scheduled appointment.  Any new CT, MRI or other procedures ordered by your specialist must be performed  at a [facility] or coordinated by your clinic's referral office.    >>   List of current medications   >>   This referral request   >>   Any documents/labs given to you for this referral    ==This Referral PRINTS in the [facility] (ORTHOTICS & PROSTHETICS) Central scheduling office ==     The [facility] Central Scheduling staff will contact patient to arrange appointments. Central Scheduling Phone #:  MIKE Conroy  [phone]     Diabetic shoes with multidensity inserts x3     FOOT AND ANKLE ORTHOTIC PRESCRIPTION:  Full Length Foot Orthotic:  Foam: Bilateral                  Your next 10 appointments already scheduled     Mar 02, 2018  1:30 PM CST   (Arrive by 1:15 PM)   Office Visit with Brenden Blackwell [facility] Therapy [facility] ([facility])    [address] [place] [address]  3rd Floor  [facility] [address] [phone]           Bring a current list of meds and any records pertaining to this visit. For Physicals, please bring immunization records and any forms needing to be filled out. Please arrive 10 minutes early to complete paperwork.            Apr 11, 2018  9:45 AM CDT   Lab with [facility]   [facility] ([facility])    [address]  1st Floor  [facility] [address] [phone]            Apr 11, 2018 10:45 AM CDT   (Arrive by 10:15 AM)   Return Visit with Kathryn Basilio MD   [facility] ([facility])    [address] [place] [address]  [address]  [facility] [address] [phone]              Who to contact     Please call your clinic at [phone] to:    Ask questions about your health    Make or cancel appointments    Discuss your medicines    Learn about your test results    Speak to your doctor            Additional Information About Your Visit        MyChart Information     [employer] is an electronic gateway that provides easy, online access to your  "medical records. With aBIZinaBOX, you can request a clinic appointment, read your test results, renew a prescription or communicate with your care team.     To sign up for aBIZinaBOX visit the website at [email or web address]   You will be asked to enter the access code listed below, as well as some personal information. Please follow the directions to create your username and password.     Your access code is: [email or web address]  Expires: 3/13/2018  4:12 PM     Your access code will  in 90 days. If you need help or a new code, please contact your [facility] or call [phone] for assistance.        Care EveryWhere ID     This is your Care EveryWhere ID. This could be used by other organizations to access your [place] medical records  [id number]        Your Vitals Were     Height BMI (Body Mass Index)                1.67 m (5' 5.75\") 31.06 kg/m2           Blood Pressure from Last 3 Encounters:   18 (P) 150/60   18 121/69   18 163/52    Weight from Last 3 Encounters:   18 86.6 kg (191 lb)   18 (P) 86.6 kg (191 lb)   18 86.6 kg (191 lb)              We Performed the Following     DEBRIDEMENT OF NAIL(S), 1-5     ORTHOPEDICS ADULT REFERRAL     ORTHOTICS REFERRAL (Foot and Ankle)     TRIM HYPERKERATOTIC SKIN LESION,2-4          Today's Medication Changes          These changes are accurate as of 3/2/18  1:04 PM.  If you have any questions, ask your nurse or doctor.               These medicines have changed or have updated prescriptions.        Dose/Directions    ferrous sulfate 325 (65 FE) MG tablet   Commonly known as:  IRON   This may have changed:  when to take this   Used for:  Iron deficiency        Dose:  1 tablet   Take 1 tablet (325 mg) by mouth 2 times daily   Quantity:  180 tablet   Refills:  1                Primary Care Provider Office Phone # Fax #    Noam Jackson [phone]       [address]  [facility] [address]        Equal Access to Services     KALYANI WARREN : Hadii aad ku hadmicaeladebbie Jameeali, waulyssesda luqadaha, qaanivalta kamarcusshell parisi, madeline chaudhari. So [facility] [phone].    ATENCIÓN: Si habla español, tiene a santoro disposición servicios gratuitos de asistencia lingüística. Xeniaame al [phone].    We comply with applicable federal civil rights laws and [place] laws. We do not discriminate on the basis of race, color, national origin, age, disability, sex, sexual orientation, or gender identity.            Thank you!     Thank you for choosing [facility]  for your care. Our goal is always to provide you with excellent care. Hearing back from our patients is one way we can continue to improve our services. Please take a few minutes to complete the written survey that you may receive in the mail after your visit with us. Thank you!             Your Updated Medication List - Protect others around you: Learn how to safely use, store and throw away your medicines at www.disposemymeds.org.          This list is accurate as of 3/2/18  1:04 PM.  Always use your most recent med list.                   Brand Name Dispense Instructions for use Diagnosis    albuterol 108 (90 BASE) MCG/ACT Inhaler    PROAIR HFA/PROVENTIL HFA/VENTOLIN HFA    3 Inhaler    Inhale 2 puffs into the lungs every 6 hours as needed for shortness of breath / dyspnea or wheezing    Cough       ASPIRIN NOT PRESCRIBED    INTENTIONAL    0 each    1 each continuous prn Antiplatelet medication not prescribed intentionally due to current nosebleeds    Anemia due to blood loss, acute       atorvastatin 20 MG tablet    LIPITOR    90 tablet    Take 1 tablet (20 mg) by mouth daily    Hyperlipidemia LDL goal <100       * blood glucose monitoring test strip    ONETOUCH ULTRA    200 strip    Use to test blood sugars 2 times daily or as directed.    Type 2 diabetes mellitus with stage 3 chronic kidney  disease, without long-term current use of insulin (H)       * blood glucose monitoring test strip    ONETOUCH ULTRA    200 strip    Test your blood sugar 3-4 times per day.    Type 2 diabetes mellitus with hyperglycemia, with long-term current use of insulin (H)       carvedilol 12.5 MG tablet    COREG    60 tablet    Take 1 tablet (12.5 mg) by mouth 2 times daily (with meals)    Essential hypertension with goal blood pressure less than 140/90       DULoxetine 30 MG EC capsule    CYMBALTA    180 capsule    Take 1 capsule (30 mg) by mouth 2 times daily    Type 2 diabetes mellitus with diabetic nephropathy, with long-term current use of insulin (H), Diabetic polyneuropathy associated with diabetes mellitus due to underlying condition (H)       ferrous sulfate 325 (65 FE) MG tablet    IRON    180 tablet    Take 1 tablet (325 mg) by mouth 2 times daily    Iron deficiency       * furosemide 40 MG tablet    LASIX    30 tablet    Take 1 tablet (40 mg) by mouth daily    Lymphedema of both lower extremities       * furosemide 20 MG tablet    LASIX    30 tablet    Take 1 tablet (20 mg) by mouth At Bedtime    Lymphedema of both lower extremities, Essential hypertension with goal blood pressure less than 140/90       insulin glargine 100 UNIT/ML injection    LANTUS    3 mL    Inject 20 Units Subcutaneous At Bedtime    Type 2 diabetes mellitus with diabetic neuropathy, with long-term current use of insulin (H)       insulin pen needle 31G X 6 MM    ULTICARE MINI    100 each    Use daily or as directed.    Type 2 diabetes, HbA1c goal < 7% (H)       lactobacillus rhamnosus (GG) capsule     60 capsule    Take 1 capsule by mouth 2 times daily    Cellulitis of right leg       miconazole 2 % powder    MICATIN; MICRO GUARD    71 g    Apply topically 2 times daily    Fungal dermatitis       MULTIVITAMIN PO      1 tablet by mouth daily        NovoLOG FLEXPEN 100 UNIT/ML injection   Generic drug:  insulin aspart     15 mL    Inject 4  units SQ with breakfast, lunch and dinner.    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    Edema of lower extremity       tolterodine 2 MG 24 hr capsule    DETROL LA    60 capsule    TAKE 1 CAPSULE (2 MG) BY MOUTH DAILY    Urge incontinence of urine       Urea 20 % Crea cream     85 g    Apply topically daily    Xerosis cutis, Tyloma       * Notice:  This list has 8 medication(s) that are the same as other medications prescribed for you. Read the directions carefully, and ask your doctor or other care provider to review them with you.

## 2018-03-08 ENCOUNTER — MEDICAL CORRESPONDENCE (OUTPATIENT)
Dept: HEALTH INFORMATION MANAGEMENT | Facility: CLINIC | Age: 68
End: 2018-03-08

## 2018-03-09 DIAGNOSIS — R80.9 PROTEINURIA: ICD-10-CM

## 2018-03-09 RX ORDER — LISINOPRIL 40 MG/1
TABLET ORAL
Qty: 0.01 TABLET | Refills: 0 | OUTPATIENT
Start: 2018-03-09

## 2018-03-09 NOTE — TELEPHONE ENCOUNTER
Writer notes 1/26/18 - medication discontinued upon hospital discharge - patient discharge notes reflect this medication change - called patient - she didn't make request and has discontinued medication - OK to cancel request    Refused Prescriptions:                       Disp   Refills    lisinopril (PRINIVIL/ZESTRIL) 40 MG tablet*0.01 t*0        Sig: TAKE 1 TABLET (40 MG) BY MOUTH 2 TIMES DAILY  Refused By: SARAH TURNER  Reason for Refusal: OTHER  Reason for Refusal Comment: previously discontinued      Closing encounter - no further actions needed at this time    Sarah Turner RN
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
## 2018-03-14 ENCOUNTER — CARE COORDINATION (OUTPATIENT)
Dept: NEPHROLOGY | Facility: CLINIC | Age: 68
End: 2018-03-14

## 2018-03-14 DIAGNOSIS — N18.5 CKD (CHRONIC KIDNEY DISEASE) STAGE 5, GFR LESS THAN 15 ML/MIN (H): Primary | ICD-10-CM

## 2018-03-14 NOTE — PROGRESS NOTES
Reason for Call    Called patient's daughter, Caroline, for CKD 5 follow up and to monitor for uremic symptoms.  Caroline reports patient is doing well. Appetite is good with no nausea or vomiting, no increased fatigued, no itching, no chest pain or shortness of breath. BPs have been at goal with no dizziness or edema.   Patient is scheduled to follow up on 4/11. Daughter would like to check labs sooner to keep an eye on things, and is worried that patient may be a little dry.     Collaboration    Will review with Dr. Basilio.     Pravin Cordova, RN, BAN  Nephrology RN Care Coordinator

## 2018-03-15 PROCEDURE — G0180 MD CERTIFICATION HHA PATIENT: HCPCS | Performed by: FAMILY MEDICINE

## 2018-03-29 ENCOUNTER — ALLIED HEALTH/NURSE VISIT (OUTPATIENT)
Dept: TRANSPLANT | Facility: CLINIC | Age: 68
End: 2018-03-29
Attending: INTERNAL MEDICINE
Payer: MEDICARE

## 2018-03-29 ENCOUNTER — RESULTS ONLY (OUTPATIENT)
Dept: OTHER | Facility: CLINIC | Age: 68
End: 2018-03-29

## 2018-03-29 ENCOUNTER — ALLIED HEALTH/NURSE VISIT (OUTPATIENT)
Dept: TRANSPLANT | Facility: CLINIC | Age: 68
End: 2018-03-29

## 2018-03-29 ENCOUNTER — ALLIED HEALTH/NURSE VISIT (OUTPATIENT)
Dept: RESEARCH | Facility: CLINIC | Age: 68
End: 2018-03-29

## 2018-03-29 ENCOUNTER — RADIANT APPOINTMENT (OUTPATIENT)
Dept: GENERAL RADIOLOGY | Facility: CLINIC | Age: 68
End: 2018-03-29
Attending: PHYSICIAN ASSISTANT
Payer: MEDICARE

## 2018-03-29 ENCOUNTER — OFFICE VISIT (OUTPATIENT)
Dept: TRANSPLANT | Facility: CLINIC | Age: 68
End: 2018-03-29
Attending: TRANSPLANT SURGERY
Payer: MEDICARE

## 2018-03-29 ENCOUNTER — RADIANT APPOINTMENT (OUTPATIENT)
Dept: CARDIOLOGY | Facility: CLINIC | Age: 68
End: 2018-03-29
Attending: PHYSICIAN ASSISTANT
Payer: MEDICARE

## 2018-03-29 ENCOUNTER — APPOINTMENT (OUTPATIENT)
Dept: LAB | Facility: CLINIC | Age: 68
End: 2018-03-29
Payer: MEDICARE

## 2018-03-29 VITALS
DIASTOLIC BLOOD PRESSURE: 52 MMHG | HEIGHT: 66 IN | OXYGEN SATURATION: 97 % | BODY MASS INDEX: 29.89 KG/M2 | SYSTOLIC BLOOD PRESSURE: 159 MMHG | WEIGHT: 186 LBS | TEMPERATURE: 98.6 F | HEART RATE: 61 BPM

## 2018-03-29 DIAGNOSIS — N18.9 CHRONIC RENAL FAILURE: ICD-10-CM

## 2018-03-29 DIAGNOSIS — I10 ESSENTIAL HYPERTENSION: ICD-10-CM

## 2018-03-29 DIAGNOSIS — E11.22 TYPE 2 DIABETES MELLITUS WITH DIABETIC CHRONIC KIDNEY DISEASE, UNSPECIFIED CKD STAGE, UNSPECIFIED LONG TERM INSULIN USE STATUS: ICD-10-CM

## 2018-03-29 DIAGNOSIS — Z00.6 EXAMINATION OF PARTICIPANT IN CLINICAL TRIAL: Primary | ICD-10-CM

## 2018-03-29 DIAGNOSIS — Z76.82 ORGAN TRANSPLANT CANDIDATE: Primary | ICD-10-CM

## 2018-03-29 DIAGNOSIS — Z85.828 HISTORY OF SKIN CANCER: ICD-10-CM

## 2018-03-29 DIAGNOSIS — Z12.2 ENCOUNTER FOR SCREENING FOR MALIGNANT NEOPLASM OF LUNG: ICD-10-CM

## 2018-03-29 DIAGNOSIS — J44.9 CHRONIC OBSTRUCTIVE PULMONARY DISEASE, UNSPECIFIED COPD TYPE (H): ICD-10-CM

## 2018-03-29 DIAGNOSIS — E78.5 HYPERLIPIDEMIA: ICD-10-CM

## 2018-03-29 DIAGNOSIS — Z87.891 HISTORY OF TOBACCO USE: ICD-10-CM

## 2018-03-29 DIAGNOSIS — Z76.82 ORGAN TRANSPLANT CANDIDATE: ICD-10-CM

## 2018-03-29 DIAGNOSIS — Z01.818 PRE-TRANSPLANT EVALUATION FOR CKD (CHRONIC KIDNEY DISEASE): Primary | ICD-10-CM

## 2018-03-29 DIAGNOSIS — E11.9 DIABETES MELLITUS, TYPE 2 (H): ICD-10-CM

## 2018-03-29 DIAGNOSIS — G47.33 OBSTRUCTIVE SLEEP APNEA: ICD-10-CM

## 2018-03-29 DIAGNOSIS — Z76.82 AWAITING ORGAN TRANSPLANT STATUS: Primary | ICD-10-CM

## 2018-03-29 DIAGNOSIS — N18.5 CKD (CHRONIC KIDNEY DISEASE) STAGE 5, GFR LESS THAN 15 ML/MIN (H): ICD-10-CM

## 2018-03-29 DIAGNOSIS — N18.6 ESRD (END STAGE RENAL DISEASE) (H): Primary | ICD-10-CM

## 2018-03-29 PROCEDURE — 86147 CARDIOLIPIN ANTIBODY EA IG: CPT | Performed by: PHYSICIAN ASSISTANT

## 2018-03-29 PROCEDURE — 86644 CMV ANTIBODY: CPT | Performed by: PHYSICIAN ASSISTANT

## 2018-03-29 PROCEDURE — 86905 BLOOD TYPING RBC ANTIGENS: CPT | Performed by: PHYSICIAN ASSISTANT

## 2018-03-29 PROCEDURE — 40000866 ZZHCL STATISTIC HIV 1/2 ANTIGEN/ANTIBODY PRETRANSPLANT ONLY: Performed by: PHYSICIAN ASSISTANT

## 2018-03-29 PROCEDURE — 86787 VARICELLA-ZOSTER ANTIBODY: CPT | Performed by: PHYSICIAN ASSISTANT

## 2018-03-29 PROCEDURE — 86780 TREPONEMA PALLIDUM: CPT | Performed by: PHYSICIAN ASSISTANT

## 2018-03-29 PROCEDURE — 86665 EPSTEIN-BARR CAPSID VCA: CPT | Performed by: PHYSICIAN ASSISTANT

## 2018-03-29 PROCEDURE — 85670 THROMBIN TIME PLASMA: CPT | Performed by: PHYSICIAN ASSISTANT

## 2018-03-29 PROCEDURE — 81241 F5 GENE: CPT | Performed by: PHYSICIAN ASSISTANT

## 2018-03-29 PROCEDURE — 86706 HEP B SURFACE ANTIBODY: CPT | Performed by: PHYSICIAN ASSISTANT

## 2018-03-29 PROCEDURE — 86704 HEP B CORE ANTIBODY TOTAL: CPT | Performed by: PHYSICIAN ASSISTANT

## 2018-03-29 PROCEDURE — 84681 ASSAY OF C-PEPTIDE: CPT | Performed by: PHYSICIAN ASSISTANT

## 2018-03-29 PROCEDURE — G0472 HEP C SCREEN HIGH RISK/OTHER: HCPCS | Performed by: PHYSICIAN ASSISTANT

## 2018-03-29 PROCEDURE — 86480 TB TEST CELL IMMUN MEASURE: CPT | Performed by: PHYSICIAN ASSISTANT

## 2018-03-29 PROCEDURE — 86886 COOMBS TEST INDIRECT TITER: CPT | Performed by: PHYSICIAN ASSISTANT

## 2018-03-29 PROCEDURE — 99207 ZZC DROP WITH A PROCEDURE: CPT | Mod: 25

## 2018-03-29 PROCEDURE — 85730 THROMBOPLASTIN TIME PARTIAL: CPT | Performed by: PHYSICIAN ASSISTANT

## 2018-03-29 PROCEDURE — 85613 RUSSELL VIPER VENOM DILUTED: CPT | Performed by: PHYSICIAN ASSISTANT

## 2018-03-29 PROCEDURE — 81240 F2 GENE: CPT | Performed by: PHYSICIAN ASSISTANT

## 2018-03-29 PROCEDURE — G0499 HEPB SCREEN HIGH RISK INDIV: HCPCS | Performed by: PHYSICIAN ASSISTANT
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
## 2018-03-29 NOTE — MR AVS SNAPSHOT
After Visit Summary   3/29/2018    Supriya Herr    MRN: [id number]           Patient Information     Date Of Birth          1950        Visit Information        Provider Department      3/29/2018 9:30 AM JENNIFER KAUR PATIENT 2 [facility] Solid Organ Transplant        Today's Diagnoses     Pre-transplant evaluation for CKD (chronic kidney disease)    -  1    History of skin cancer        Chronic obstructive pulmonary disease, unspecified COPD type (H)        Encounter for screening for malignant neoplasm of lung        Type 2 diabetes mellitus with diabetic chronic kidney disease, unspecified CKD stage, unspecified long term insulin use status (H)        CKD (chronic kidney disease) stage 5, GFR less than 15 ml/min (H)           Follow-ups after your visit        Additional Services     DERMATOLOGY REFERRAL       Your provider has referred you to: [facility]: Dermatology Clinic - [place] [phone]   [email or web address]    Please be aware that coverage of these services is subject to the terms and limitations of your health insurance plan.  Call member services at your health plan with any benefit or coverage questions.      Please bring the following with you to your appointment:    (1) Any X-Rays, CTs or MRIs which have been performed.  Contact the facility where they were done to arrange for  prior to your scheduled appointment.    (2) List of current medications  (3) This referral request   (4) Any documents/labs given to you for this referral                  Your next 10 appointments already scheduled     Apr 03, 2018 12:30 PM CDT   (Arrive by 12:15 PM)   NEW FOOT/ANKLE with Alvaro Gautam MD   [facility] Orthopaedic Clinic ([facility])    [address] [place] [address] [phone]            Apr 11, 2018  9:45 AM CDT   Lab with  LAB   [facility] Lab ([facility])     [address]  [address] [facility] [address]   [phone]            Apr 11, 2018 10:45 AM CDT   (Arrive by 10:15 AM)   Return Visit with Kathryn Basilio MD   [facility] Nephrology ([facility])    [address] [place] [address]  [address]  [facility] [address]   [phone]            Apr 23, 2018  8:30 AM CDT   US AORTIC IMAGING with UCUSV2   [facility] Imaging Center US ([facility])    [address] [place] [id number] [phone]           Please bring a list of your medicines (including vitamins, minerals and over-the-counter drugs). Also, tell your doctor about any allergies you may have. Wear comfortable clothes and leave your valuables at home.  Adults: No eating or drinking for 8 hours before the exam. You may take medicine with a small sip of water.  Children: - Children 6+ years: No food or drink for 6 hours before exam. - Children 1-5 years: No food or drink for 4 hours before exam. - Infants, breast-fed: may have breast milk up to 2 hours before exam. - Infants, formula: may have bottle until 4 hours before exam.  Please call the Imaging Department at your exam site with any questions.            Apr 23, 2018  9:00 AM CDT   US AORTA/IVC/ILIAC DUPLEX COMPLETE with UCUSV2   [facility] Imaging Center US ([facility])    [address] [place] [address] [phone]           Please bring a list of your medicines (including vitamins, minerals and over-the-counter drugs). Also, tell your doctor about any allergies you may have. Wear comfortable clothes and leave your valuables at home.  Adults: No eating or drinking for 8 hours before the exam. You may take medicine with a small sip of water.  Children: - Children 6+ years: No food or drink for 6 hours before exam. - Children 1-5 years: No food or drink for 4 hours before exam. - Infants, breast-fed: may have breast  milk up to 2 hours before exam. - Infants, formula: may have bottle until 4 hours before exam.  Please call the Imaging Department at your exam site with any questions.            Apr 23, 2018 10:30 AM CDT   FULL PULMONARY FUNCTION with UC PFL D   [facility] Pulmonary Function Testing ([facility])    [address]  [address] [facility] [id number] [phone]            Apr 23, 2018  2:30 PM CDT   (Arrive by 2:15 PM)   NEW PANCREAS/KIDNEY TRANSPLANT WORK-UP with Milton Guadarrama MD   [facility] Heart Care ([facility])    [address]  [address]  [facility] [id number] [phone]            Jun 27, 2018  2:30 PM CDT   (Arrive by 2:15 PM)   New Patient Visit with ABIMAEL Shepard MD   [facility] Dermatology ([facility])    [address]  [address] [facility] [id number] [phone]              Future tests that were ordered for you today     Open Future Orders        Priority Expected Expires Ordered    MA SCREENING DIGITAL BILAT - Future  (s+30) Routine  3/30/2019 3/30/2018            Who to contact     If you have questions or need follow up information about today's clinic visit or your schedule please contact [facility] directly at [phone].  Normal or non-critical lab and imaging results will be communicated to you by [employer], letter or phone within 4 business days after the clinic has received the results. If you do not hear from us within 7 days, please contact the clinic through [employer] or phone. If you have a critical or abnormal lab result, we will notify you by phone as soon as possible.  Submit refill requests through [employer] or call your pharmacy and they will forward the refill request to us. Please allow 3 business days for your refill to be completed.          Additional Information About Your Visit        K[employer]mit Information     [employer] lets you send messages to  "your doctor, view your test results, renew your prescriptions, schedule appointments and more. To sign up, go to [email or web address] . Click on \"Log in\" on the left side of the screen, which will take you to the Welcome page. Then click on \"Sign up Now\" on the right side of the page.     You will be asked to enter the access code listed below, as well as some personal information. Please follow the directions to create your username and password.     Your access code is: [email or web address]  Expires: 2018  4:04 PM     Your access code will  in 90 days. If you need help or a new code, please call your [place] clinic or [phone].        Care EveryWhere ID     This is your Care EveryWhere ID. This could be used by other organizations to access your [place] medical records  [id number]        Your Vitals Were     Pulse Temperature Height Pulse Oximetry BMI (Body Mass Index)       61 98.6  F (37  C) (Oral) 1.67 m (5' 5.75\") 97% 30.25 kg/m2        Blood Pressure from Last 3 Encounters:   18 136/48   18 159/52   18 137/70    Weight from Last 3 Encounters:   18 85.3 kg (188 lb)   18 84.4 kg (186 lb)   18 86.6 kg (191 lb)              We Performed the Following     DERMATOLOGY REFERRAL        Primary Care Provider Office Phone # Fax #    Noam Jackson [phone]       [address] [address]        Equal Access to Services     [facility]: Hadii danisha Lam, kristin crocker, madeline almodovar. So [facility] [phone].    ATENCIÓN: Si habla español, tiene a santoro disposición servicios gratuitos de asistencia lingüística. Renuka al [phone].    We comply with applicable federal civil rights laws and [place] laws. We do not discriminate on the basis of race, color, national origin, age, disability, sex, sexual orientation, or gender identity.            Thank you!  "    Thank you for choosing [facility]  for your care. Our goal is always to provide you with excellent care. Hearing back from our patients is one way we can continue to improve our services. Please take a few minutes to complete the written survey that you may receive in the mail after your visit with us. Thank you!             Your Updated Medication List - Protect others around you: Learn how to safely use, store and throw away your medicines at www.disposemymeds.org.          This list is accurate as of 3/29/18 11:59 PM.  Always use your most recent med list.                   Brand Name Dispense Instructions for use Diagnosis    albuterol 108 (90 BASE) MCG/ACT Inhaler    PROAIR HFA/PROVENTIL HFA/VENTOLIN HFA    3 Inhaler    Inhale 2 puffs into the lungs every 6 hours as needed for shortness of breath / dyspnea or wheezing    Cough       ASPIRIN NOT PRESCRIBED    INTENTIONAL    0 each    1 each continuous prn Antiplatelet medication not prescribed intentionally due to current nosebleeds    Anemia due to blood loss, acute       atorvastatin 20 MG tablet    LIPITOR    90 tablet    Take 1 tablet (20 mg) by mouth daily    Hyperlipidemia LDL goal <100       * blood glucose monitoring test strip    ONETOUCH ULTRA    200 strip    Use to test blood sugars 2 times daily or as directed.    Type 2 diabetes mellitus with stage 3 chronic kidney disease, without long-term current use of insulin (H)       * blood glucose monitoring test strip    ONETOUCH ULTRA    200 strip    Test your blood sugar 3-4 times per day.    Type 2 diabetes mellitus with hyperglycemia, with long-term current use of insulin (H)       CALCITRIOL PO      Take by mouth daily Unsure of dosage        carvedilol 12.5 MG tablet    COREG    60 tablet    Take 1 tablet (12.5 mg) by mouth 2 times daily (with meals)    Essential hypertension with goal blood pressure less than 140/90       ferrous sulfate 325 (65 FE) MG tablet    IRON    180  tablet    Take 1 tablet (325 mg) by mouth 2 times daily    Iron deficiency       * furosemide 40 MG tablet    LASIX    30 tablet    Take 1 tablet (40 mg) by mouth daily    Lymphedema of both lower extremities       * furosemide 20 MG tablet    LASIX    30 tablet    Take 1 tablet (20 mg) by mouth At Bedtime    Lymphedema of both lower extremities, Essential hypertension with goal blood pressure less than 140/90       insulin glargine 100 UNIT/ML injection    LANTUS    3 mL    Inject 20 Units Subcutaneous At Bedtime    Type 2 diabetes mellitus with diabetic neuropathy, with long-term current use of insulin (H)       insulin pen needle 31G X 6 MM    ULTICARE MINI    100 each    Use daily or as directed.    Type 2 diabetes, HbA1c goal < 7% (H)       lactobacillus rhamnosus (GG) capsule     60 capsule    Take 1 capsule by mouth 2 times daily    Cellulitis of right leg       miconazole 2 % powder    MICATIN; MICRO GUARD    71 g    Apply topically 2 times daily    Fungal dermatitis       MULTIVITAMIN PO      1 tablet by mouth daily        NovoLOG FLEXPEN 100 UNIT/ML injection   Generic drug:  insulin aspart     15 mL    Inject 4 units SQ with breakfast, lunch and dinner.    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    Edema of lower extremity       Urea 20 % Crea cream     85 g    Apply topically daily    Xerosis cutis, Tyloma       * Notice:  This list has 7 medication(s) that are the same as other medications prescribed for you. Read the directions carefully, and ask your doctor or other care provider to review them with you.

## 2018-03-29 NOTE — PROGRESS NOTES
"Kidney Transplant Evaluation - 3/29/2018  Supriya Herr attended the pre-transplant patient education class today. Her daughter was with her today.They were both very attentive and asked a few good questions. The [facility] website pre-transplant modules were viewed; class participants were educated on using the site.     Content reviewed:    Living Donation and how to access that program    Paired exchange    Kidney Donor Profile Index (KDPI)    Waiting list issues (right to decline without penalty, high PHS risk donors, what to expect when called with an offer)    Hospital experience,  length of stay , need to stay locally post-discharge (2-4 weeks)    Surgical options (with pictures)                             Post-surgery lifting and driving restrictions    Post-transplant routines, frequency of lab work and clinic visits    Need to stay locally post-discharge (2-4 weeks)    Role of Transplant Coordinator    Participants were informed of the benefits of transplant as well as potential risks such as infection, cancer, and death.  The need for total adherence with immunosuppression medications and following transplant regimens was stressed.  The overall evaluation/approval/listing process was reviewed.        The patient was provided with the following documents:  What You Need to Know About a Kidney Transplant  Adult Kidney Transplant - A Guide for Patients  SRTR Data Sheet - Kidney  Brochure - Kidney Allocation  Brochure - Multiple Listing and Waiting Time Transfer  What Every Patient Needs to Know (UNOS)  UNOS Facts and Figures  Finding a Donor  [facility] - Quick Start Guide  [facility] Consent  Receipt of Information form    Supriya Herr signed the  Receipt of Information for Organ Transplant Recipient.\" She was provided [employer] business card and instructed to call with additional questions.      Summary    Team s concerns/comments: Team expressed concern about Supriya's complaint " that whenever she has anxiety she experiences substernal non radiating chest pain. She will see cardiology. Tomorrow she will see her PCP and will discuss the probable yeast under her belly and getting a low dose chest CT done for surveillance. History of skin cancer on her ankle and orders placed for Dermatology consult.     Candidacy category: Yellow    Action/Plan: Continue with evaluation.    Expected Selection Meeting Discussion: Discuss on 4/4/2018

## 2018-03-29 NOTE — MR AVS SNAPSHOT
After Visit Summary   3/29/2018    Supriya Herr    MRN: [id number]           Patient Information     Date Of Birth          1950        Visit Information        Provider Department      3/29/2018 9:33 AM Mini Farmer RN [facility] Solid Organ Transplant        Today's Diagnoses     Awaiting organ transplant status    -  1       Follow-ups after your visit        Your next 10 appointments already scheduled     Mar 29, 2018  1:00 PM CDT   Ech Complete with UCECHCR2   [facility] Echo ([facility])    [address] [facility]  3rd [facility] [address] [phone]           1. Please bring or wear a comfortable two-piece outfit. 2. You may eat, drink and take your normal medicines. 3. For any questions that cannot be answered, please contact the ordering physician            Mar 29, 2018  2:00 PM CDT   Lab with  LAB   [facility] Lab ([facility])    [address] [place] [address]  1st [facility] [address] [phone]            Mar 29, 2018  2:40 PM CDT   (Arrive by 2:25 PM)   ecg with  CV EKG   [facility] Xray ([facility])    [address] [place] [address]  1st [facility] [address] [phone]            Mar 30, 2018  1:00 PM CDT   PHYSICAL with Noam Jackson MD   [facility] ([facility]    [address] [place] [address] [phone]            Apr 03, 2018 12:30 PM CDT   (Arrive by 12:15 PM)   NEW FOOT/ANKLE with Alvaro Gautam MD   [facility] ([facility])    [address] [place] [address]  4th [facility] [address] [phone]            Apr 11, 2018  9:45 AM CDT   Lab with UC LAB   [facility] Lab ([facility])    [address] [place] [address]  1st [facility] [address] [phone]            Apr 11, 2018 10:45 AM CDT   (Arrive by 10:15  AM)   Return Visit with Kathryn Basilio MD   [facility] ([facility])    [address] [facility]  [address]  [facility] [address] [phone]            Apr 23, 2018  8:30 AM CDT   US AORTIC IMAGING with UCUSV2   [facility] US ([facility])    [address] [address] [phone]           Please bring a list of your medicines (including vitamins, minerals and over-the-counter drugs). Also, tell your doctor about any allergies you may have. Wear comfortable clothes and leave your valuables at home.  Adults: No eating or drinking for 8 hours before the exam. You may take medicine with a small sip of water.  Children: - Children 6+ years: No food or drink for 6 hours before exam. - Children 1-5 years: No food or drink for 4 hours before exam. - Infants, breast-fed: may have breast milk up to 2 hours before exam. - Infants, formula: may have bottle until 4 hours before exam.  Please call the Imaging Department at your exam site with any questions.            Apr 23, 2018  9:00 AM CDT   US AORTA/IVC/ILIAC DUPLEX COMPLETE with UCUSV2   [facility] US ([facility])    [address] [address] [phone]           Please bring a list of your medicines (including vitamins, minerals and over-the-counter drugs). Also, tell your doctor about any allergies you may have. Wear comfortable clothes and leave your valuables at home.  Adults: No eating or drinking for 8 hours before the exam. You may take medicine with a small sip of water.  Children: - Children 6+ years: No food or drink for 6 hours before exam. - Children 1-5 years: No food or drink for 4 hours before exam. - Infants, breast-fed: may have breast milk up to 2 hours before exam. - Infants, formula: may have bottle until 4 hours before exam.  Please call the Imaging Department  "at your exam site with any questions.            2018  2:30 PM CDT   (Arrive by 2:15 PM)   NEW PANCREAS/KIDNEY TRANSPLANT WORK-UP with Milton Guadarrama MD   [facility] ([facility])    [address] [facility]  [address] [place] [address] [phone]              Who to contact     If you have questions or need follow up information about today's clinic visit or your schedule please contact [facility] SOLID ORGAN TRANSPLANT directly at [phone].  Normal or non-critical lab and imaging results will be communicated to you by [employer]hart, letter or phone within 4 business days after the clinic has received the results. If you do not hear from us within 7 days, please contact the clinic through [employer] or phone. If you have a critical or abnormal lab result, we will notify you by phone as soon as possible.  Submit refill requests through [employer]box or call your pharmacy and they will forward the refill request to us. Please allow 3 business days for your refill to be completed.          Additional Information About Your Visit        Dropbox Information     Dropbox lets you send messages to your doctor, view your test results, renew your prescriptions, schedule appointments and more. To sign up, go to [email or web address] . Click on \"Log in\" on the left side of the screen, which will take you to the Welcome page. Then click on \"Sign up Now\" on the right side of the page.     You will be asked to enter the access code listed below, as well as some personal information. Please follow the directions to create your username and password.     Your access code is: [email or web address]  Expires: 2018  4:04 PM     Your access code will  in 90 days. If you need help or a new code, please call your [place] clinic or [phone].        Care EveryWhere ID     This is your Care EveryWhere ID. This could be used by other organizations to access your [place] medical " records  [id number]         Blood Pressure from Last 3 Encounters:   03/29/18 159/52   03/02/18 137/70   03/02/18 (P) 150/60    Weight from Last 3 Encounters:   03/29/18 84.4 kg (186 lb)   03/02/18 86.6 kg (191 lb)   03/02/18 (P) 86.6 kg (191 lb)              Today, you had the following     No orders found for display       Primary Care Provider Office Phone # Fax #    Noam Luis Jackson [phone]       [address]  [facility] [address]        Equal Access to Services     [facility]: Hadii danisha Lam, waaxda lizzette, qaanivalta kaalmada ailin, madeline sood . So [facility] [phone].    ATENCIÓN: Si habla español, tiene a santoro disposición servicios gratuitos de asistencia lingüística. [facility] [phone].    We comply with applicable federal civil rights laws and [place] laws. We do not discriminate on the basis of race, color, national origin, age, disability, sex, sexual orientation, or gender identity.            Thank you!     Thank you for choosing [facility] SOLID ORGAN TRANSPLANT  for your care. Our goal is always to provide you with excellent care. Hearing back from our patients is one way we can continue to improve our services. Please take a few minutes to complete the written survey that you may receive in the mail after your visit with us. Thank you!             Your Updated Medication List - Protect others around you: Learn how to safely use, store and throw away your medicines at www.disposemymeds.org.          This list is accurate as of 3/29/18 12:59 PM.  Always use your most recent med list.                   Brand Name Dispense Instructions for use Diagnosis    albuterol 108 (90 BASE) MCG/ACT Inhaler    PROAIR HFA/PROVENTIL HFA/VENTOLIN HFA    3 Inhaler    Inhale 2 puffs into the lungs every 6 hours as needed for shortness of breath / dyspnea or wheezing    Cough       ASPIRIN NOT PRESCRIBED    INTENTIONAL    0 each     1 each continuous prn Antiplatelet medication not prescribed intentionally due to current nosebleeds    Anemia due to blood loss, acute       atorvastatin 20 MG tablet    LIPITOR    90 tablet    Take 1 tablet (20 mg) by mouth daily    Hyperlipidemia LDL goal <100       * blood glucose monitoring test strip    ONETOUCH ULTRA    200 strip    Use to test blood sugars 2 times daily or as directed.    Type 2 diabetes mellitus with stage 3 chronic kidney disease, without long-term current use of insulin (H)       * blood glucose monitoring test strip    ONETOUCH ULTRA    200 strip    Test your blood sugar 3-4 times per day.    Type 2 diabetes mellitus with hyperglycemia, with long-term current use of insulin (H)       CALCITRIOL PO      Take by mouth daily Unsure of dosage        carvedilol 12.5 MG tablet    COREG    60 tablet    Take 1 tablet (12.5 mg) by mouth 2 times daily (with meals)    Essential hypertension with goal blood pressure less than 140/90       ferrous sulfate 325 (65 FE) MG tablet    IRON    180 tablet    Take 1 tablet (325 mg) by mouth 2 times daily    Iron deficiency       * furosemide 40 MG tablet    LASIX    30 tablet    Take 1 tablet (40 mg) by mouth daily    Lymphedema of both lower extremities       * furosemide 20 MG tablet    LASIX    30 tablet    Take 1 tablet (20 mg) by mouth At Bedtime    Lymphedema of both lower extremities, Essential hypertension with goal blood pressure less than 140/90       insulin glargine 100 UNIT/ML injection    LANTUS    3 mL    Inject 20 Units Subcutaneous At Bedtime    Type 2 diabetes mellitus with diabetic neuropathy, with long-term current use of insulin (H)       insulin pen needle 31G X 6 MM    ULTICARE MINI    100 each    Use daily or as directed.    Type 2 diabetes, HbA1c goal < 7% (H)       lactobacillus rhamnosus (GG) capsule     60 capsule    Take 1 capsule by mouth 2 times daily    Cellulitis of right leg       miconazole 2 % powder    MICATIN; MICRO  GUARD    71 g    Apply topically 2 times daily    Fungal dermatitis       MULTIVITAMIN PO      1 tablet by mouth daily        NovoLOG FLEXPEN 100 UNIT/ML injection   Generic drug:  insulin aspart     15 mL    Inject 4 units SQ with breakfast, lunch and dinner.    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    Edema of lower extremity       Urea 20 % Crea cream     85 g    Apply topically daily    Xerosis cutis, Tyloma       * Notice:  This list has 7 medication(s) that are the same as other medications prescribed for you. Read the directions carefully, and ask your doctor or other care provider to review them with you.

## 2018-03-29 NOTE — MR AVS SNAPSHOT
After Visit Summary   3/29/2018    Supriya Herr    MRN: [id number]           Patient Information     Date Of Birth          1950        Visit Information        Provider Department      3/29/2018 12:56 PM Specialty, Provider [facility] [place],  Clinical Research        Today's Diagnoses     Examination of participant in clinical trial    -  1       Follow-ups after your visit        Your next 10 appointments already scheduled     Mar 29, 2018  1:00 PM CDT   Ech Complete with UCECHCR2   [facility] Echo ([facility])    [address] [place] [address]  3rd [facility] [address] [phone]           1. Please bring or wear a comfortable two-piece outfit. 2. You may eat, drink and take your normal medicines. 3. For any questions that cannot be answered, please contact the ordering physician            Mar 29, 2018  2:00 PM CDT   Lab with UC LAB   [facility] Lab ([facility])    [address] [place] [address]  1st [facility] [address] [phone]            Mar 29, 2018  2:40 PM CDT   (Arrive by 2:25 PM)   ecg with  CV EKG   [facility] Imaging Center Xray ([facility])    [address] [place] [address]  1st [facility] [address] [phone]            Mar 30, 2018  1:00 PM CDT   PHYSICAL with Noam Jackson MD   [facility]    [address] [place] [address] [phone]            Apr 03, 2018 12:30 PM CDT   (Arrive by 12:15 PM)   NEW FOOT/ANKLE with Alvaro Gautam MD   [facility]opa[facility] ([facility])    [address] [place] [address]  4th [facility] [address] [phone]            Apr 11, 2018  9:45 AM CDT   Lab with UC LAB    [facility] Lab ([facility])    [address] [place] [address]  1st [facility] [address] [phone]            Apr 11, 2018 10:45 AM CDT    (Arrive by 10:15 AM)   Return Visit with Kathryn Basilio MD   [facility] Nephrology ([facility])    [address] [facility]  [address]  [facility] [address] [phone]            Apr 23, 2018  8:30 AM CDT   US AORTIC IMAGING with UCUSV2   [facility] US ([facility])    [address] [address] [phone]           Please bring a list of your medicines (including vitamins, minerals and over-the-counter drugs). Also, tell your doctor about any allergies you may have. Wear comfortable clothes and leave your valuables at home.  Adults: No eating or drinking for 8 hours before the exam. You may take medicine with a small sip of water.  Children: - Children 6+ years: No food or drink for 6 hours before exam. - Children 1-5 years: No food or drink for 4 hours before exam. - Infants, breast-fed: may have breast milk up to 2 hours before exam. - Infants, formula: may have bottle until 4 hours before exam.  Please call the Imaging Department at your exam site with any questions.            Apr 23, 2018  9:00 AM CDT   US AORTA/IVC/ILIAC DUPLEX COMPLETE with UCUSV2   [facility] US ([facility])    [address] [address] [phone]           Please bring a list of your medicines (including vitamins, minerals and over-the-counter drugs). Also, tell your doctor about any allergies you may have. Wear comfortable clothes and leave your valuables at home.  Adults: No eating or drinking for 8 hours before the exam. You may take medicine with a small sip of water.  Children: - Children 6+ years: No food or drink for 6 hours before exam. - Children 1-5 years: No food or drink for 4 hours before exam. - Infants, breast-fed: may have breast milk up to 2 hours before exam. - Infants, formula: may have bottle until 4 hours before exam.  Please call the  "Imaging Department at your exam site with any questions.            2018  2:30 PM CDT   (Arrive by 2:15 PM)   NEW PANCREAS/KIDNEY TRANSPLANT WORK-UP with Milton Guadarrama MD   [facility] ([facility])    [address] [facility]  [address] [place] [address] [phone]              Who to contact     If you have questions or need follow up information about today's clinic visit or your schedule please contact [facility] directly at [phone].  Normal or non-critical lab and imaging results will be communicated to you by [employer], letter or phone within 4 business days after the clinic has received the results. If you do not hear from us within 7 days, please contact the clinic through [employer] or phone. If you have a critical or abnormal lab result, we will notify you by phone as soon as possible.  Submit refill requests through [employer] or call your pharmacy and they will forward the refill request to us. Please allow 3 business days for your refill to be completed.          Additional Information About Your Visit        [employer] Information     [employer] lets you send messages to your doctor, view your test results, renew your prescriptions, schedule appointments and more. To sign up, go to [email or web address] . Click on \"Log in\" on the left side of the screen, which will take you to the Welcome page. Then click on \"Sign up Now\" on the right side of the page.     You will be asked to enter the access code listed below, as well as some personal information. Please follow the directions to create your username and password.     Your access code is: [email or web address]  Expires: 2018  4:04 PM     Your access code will  in 90 days. If you need help or a new code, please call your [place] clinic or [phone].        Care EveryWhere ID     This is your Care EveryWhere ID. This could be used by other organizations to access your [place] " medical records  [id number]         Blood Pressure from Last 3 Encounters:   03/29/18 159/52   03/02/18 137/70   03/02/18 (P) 150/60    Weight from Last 3 Encounters:   03/29/18 84.4 kg (186 lb)   03/02/18 86.6 kg (191 lb)   03/02/18 (P) 86.6 kg (191 lb)              Today, you had the following     No orders found for display       Primary Care Provider Office Phone # Fax #    Noam Luis Jackson [phone]       [address]  [facility] [address]        Equal Access to Services     [facility]: Hadii danisha jacobson hadmicaelao Soaleksandar, waaxda luqadaha, qaybta kaalmada travisda, madeline sood . So [facility] [phone].    ATENCIÓN: Si habla español, tiene a santoro disposición servicios gratuitos de asistencia lingüística. Ll[facility] [phone].    We comply with applicable federal civil rights laws and [place] laws. We do not discriminate on the basis of race, color, national origin, age, disability, sex, sexual orientation, or gender identity.            Thank you!     Thank you for choosing [facility]  for your care. Our goal is always to provide you with excellent care. Hearing back from our patients is one way we can continue to improve our services. Please take a few minutes to complete the written survey that you may receive in the mail after your visit with us. Thank you!             Your Updated Medication List - Protect others around you: Learn how to safely use, store and throw away your medicines at www.disposemymeds.org.          This list is accurate as of 3/29/18 12:58 PM.  Always use your most recent med list.                   Brand Name Dispense Instructions for use Diagnosis    albuterol 108 (90 BASE) MCG/ACT Inhaler    PROAIR HFA/PROVENTIL HFA/VENTOLIN HFA    3 Inhaler    Inhale 2 puffs into the lungs every 6 hours as needed for shortness of breath / dyspnea or wheezing    Cough       ASPIRIN NOT PRESCRIBED    INTENTIONAL     0 each    1 each continuous prn Antiplatelet medication not prescribed intentionally due to current nosebleeds    Anemia due to blood loss, acute       atorvastatin 20 MG tablet    LIPITOR    90 tablet    Take 1 tablet (20 mg) by mouth daily    Hyperlipidemia LDL goal <100       * blood glucose monitoring test strip    ONETOUCH ULTRA    200 strip    Use to test blood sugars 2 times daily or as directed.    Type 2 diabetes mellitus with stage 3 chronic kidney disease, without long-term current use of insulin (H)       * blood glucose monitoring test strip    ONETOUCH ULTRA    200 strip    Test your blood sugar 3-4 times per day.    Type 2 diabetes mellitus with hyperglycemia, with long-term current use of insulin (H)       CALCITRIOL PO      Take by mouth daily Unsure of dosage        carvedilol 12.5 MG tablet    COREG    60 tablet    Take 1 tablet (12.5 mg) by mouth 2 times daily (with meals)    Essential hypertension with goal blood pressure less than 140/90       ferrous sulfate 325 (65 FE) MG tablet    IRON    180 tablet    Take 1 tablet (325 mg) by mouth 2 times daily    Iron deficiency       * furosemide 40 MG tablet    LASIX    30 tablet    Take 1 tablet (40 mg) by mouth daily    Lymphedema of both lower extremities       * furosemide 20 MG tablet    LASIX    30 tablet    Take 1 tablet (20 mg) by mouth At Bedtime    Lymphedema of both lower extremities, Essential hypertension with goal blood pressure less than 140/90       insulin glargine 100 UNIT/ML injection    LANTUS    3 mL    Inject 20 Units Subcutaneous At Bedtime    Type 2 diabetes mellitus with diabetic neuropathy, with long-term current use of insulin (H)       insulin pen needle 31G X 6 MM    ULTICARE MINI    100 each    Use daily or as directed.    Type 2 diabetes, HbA1c goal < 7% (H)       lactobacillus rhamnosus (GG) capsule     60 capsule    Take 1 capsule by mouth 2 times daily    Cellulitis of right leg       miconazole 2 % powder    MICATIN;  MICRO GUARD    71 g    Apply topically 2 times daily    Fungal dermatitis       MULTIVITAMIN PO      1 tablet by mouth daily        NovoLOG FLEXPEN 100 UNIT/ML injection   Generic drug:  insulin aspart     15 mL    Inject 4 units SQ with breakfast, lunch and dinner.    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    Edema of lower extremity       Urea 20 % Crea cream     85 g    Apply topically daily    Xerosis cutis, Tyloma       * Notice:  This list has 7 medication(s) that are the same as other medications prescribed for you. Read the directions carefully, and ask your doctor or other care provider to review them with you.

## 2018-03-29 NOTE — PROGRESS NOTES
Psychosocial Assessment  Patient Name/ Age: Supriya Herr 68 year old   Medical Record #: [id number]  Duration of Interview: 35 min  Process:   Face-to-Face Interview                (counseling < 50%)   Present at Appointment: Supriya and her daughter Caroline        : BOYD Gleason  Date:  2018        Type of transplant: Kidney    Donor type: Supriya's daughter reported she is interested in being a donor.      Cadaver and daughter   Prior Transplants:    No Status of Transplant: n/a       Current Living Situation    Location:   [address] [place] [address]  With Whom: lives with their daughter- they live in a duplex and Supriya lives in the basement unit and her daughter lives above       Family/ Social Support:    Supriya has one daughter, Caroline who she lives with in a duplex. Supriya has two living siblings (two siblings are ). Her two living siblings live in the [facility]. Her parents are .  available, helpful   Committed relationship:     other: Single   Other supports: Friends    Supriya recently got on the Elderly Waiver   available, helpful       Activities/ Functional Ability    Current level: Wheelchair, visually impaired (glasses) and independent with ADL's    Supriya's daughter is her PCA. She receives 4.25 hours of PCA services daily. Supriya's daughter receives help with laundry, cooking, shopping, transportation, finances, and setting up/medication reminders.     Transportation Other: daughter       Vocational/Employment/Financial     Employment   retired   Job Description      Income   SS intermediate   Insurance      At this time, patient can afford medication costs:  Yes  Medicare and MA       Medical Status    Current mode of treatment for ESRD none   Complications- Type 2 diabetes Neuropathy in hands       Behavioral    Tobacco Use No Chemical Dependency No   Supriya reported she quit smoking 2017. Supriya reported she does not drink any  alcohol or use any substances. Denied any history of chemical dependency treatment.      Psychiatric Impairment Yes   Supriya reported she has depression but stated it's only seasonal. Supriya also reported she has anxiety. Supriya reported she is not very interested in taking medications but would like to get a light therapy lamp for her seasonal depression. Discussed the importance of managing anxiety and depression prior to transplant. Supriya reported she is willing to see a therapist and will look into it with her daughter. Towards the end of the conversation, Supriya stated she would talk with her physician about medication for anxiety.     Reading ability Good  Education level: high school Recent Legal History No      Coping Style/Strategies: Deep breathing       Ability to Adhere to Complex Medical Regime: Yes     Adherence History: Supriya reported she has a history of forgetting to take her medications. Supriya reported this has improved greatly since her daughter has taken over setting up her medications and giving her medication reminders. Supriya reported she may be getting a medication machine soon as well. Supriya reported she follows her physician's recommendations.        Education  _X_ Medicare  _X_ Rehabilitation  _X_ Donor issues  _X_ Community resources  _X_ Post discharge housing  _X_ Financial resources  _X_ Medical insurance options  _X_ Psych adjustment  _X_ Family adjustment  _X_ Health Care Directive No, provided blank copy   Psychosocial Risks of Transplant Reviewed and Discussed:  _X_ Increased stress related to emotional,            family, social, employment or financial           situation  _X_ Affect on work and/or disability benefits  _X_ Affect on future health and life           insurance  _X_ Transplant outcome expectations may           not be met  _X_ Mental Health Risks: anxiety,           depression, PTSD, guilt, grief and           chronic fatigue     Notable Items:   Supriya reported  she has depression but stated it's only seasonal. Supriya also reported she has anxiety. Supriya reported she is not very interested in taking medications but would like to get a light therapy lamp for her seasonal depression. Discussed the importance of managing anxiety and depression prior to transplant. Supriya reported she is willing to see a therapist and will look into it with her daughter. Towards the end of the conversation, Supriya stated she would talk with her physician about medication for anxiety. Supriya reported she has a history of forgetting to take her medications. Supriya reported this has improved greatly since her daughter has taken over setting up her medications and giving her medication reminders. Supriya reported she may be getting a medication machine soon as well.      Final Evaluation/Assessment   Patient seemed to process information well. Appeared well informed, motivated and able to follow post transplant requirements. Behavior was appropriate during interview. Has adequate income and insurance coverage. Adequate social support. No major contraindications noted for transplant.  At this time patient appears to understand the risks and benefits of transplant.      Recommendation  Acceptable- it is this writer's recommendation patient establish therapy or speak with her physician regarding anxiety.   Selection Criteria Met:  Plan for support Yes   Chemical Dependence Yes   Smoking Yes   Mental Health Yes   Adequate Finances Yes    Signature: BOYD Gleason   Title: Clinical

## 2018-03-29 NOTE — MR AVS SNAPSHOT
After Visit Summary   3/29/2018    Supriya Herr    MRN: [id number]           Patient Information     Date Of Birth          1950        Visit Information        Provider Department      3/29/2018 10:30 AM [facility] P[facility] PATIENT 2 [facility] Solid Organ Transplant        Today's Diagnoses     ESRD (end stage renal disease) (H)    -  1       Follow-ups after your visit        Your next 10 appointments already scheduled     Mar 30, 2018  1:00 PM CDT   PHYSICAL with Noam Jackson MD   [facility] ([facility])    [address] [place] [address]  [address]  [facility] [address] [phone]            Apr 03, 2018 12:30 PM CDT   (Arrive by 12:15 PM)   NEW FOOT/ANKLE with Alvaro Gautam MD   [facility] ([facility])    [address] [place] [address]  4th Floor  [facility] [address] [phone]            Apr 11, 2018  9:45 AM CDT   Lab with JENNIFER LAB   [facility] Lab ([facility])    [address] [place] [address]  [address] [facility] [address] [phone]            Apr 11, 2018 10:45 AM CDT   (Arrive by 10:15 AM)   Return Visit with Kathryn Basilio MD   [facility] Nephrology ([facility])    [address] [place] [address]  [address]  [facility] [address] [phone]            Apr 23, 2018  8:30 AM CDT   US AORTIC IMAGING with UCUSV2   [facility] US ([facility])    [address] [place] [address]  [address] [facility] [address] [phone]           Please bring a list of your medicines (including vitamins, minerals and over-the-counter drugs). Also, tell your doctor about any allergies you may have. Wear comfortable clothes and leave your valuables at home.  Adults: No eating or drinking for 8 hours before the exam. You may take medicine with a small sip of water.  Children: - Children 6+ years: No food or drink for 6 hours before exam. -  Children 1-5 years: No food or drink for 4 hours before exam. - Infants, breast-fed: may have breast milk up to 2 hours before exam. - Infants, formula: may have bottle until 4 hours before exam.  Please call the Imaging Department at your exam site with any questions.            Apr 23, 2018  9:00 AM CDT   US AORTA/IVC/ILIAC DUPLEX COMPLETE with UCUSV2   [facility] US ([facility])    [address] [place] [address]  [address] [facility] [address] [phone]           Please bring a list of your medicines (including vitamins, minerals and over-the-counter drugs). Also, tell your doctor about any allergies you may have. Wear comfortable clothes and leave your valuables at home.  Adults: No eating or drinking for 8 hours before the exam. You may take medicine with a small sip of water.  Children: - Children 6+ years: No food or drink for 6 hours before exam. - Children 1-5 years: No food or drink for 4 hours before exam. - Infants, breast-fed: may have breast milk up to 2 hours before exam. - Infants, formula: may have bottle until 4 hours before exam.  Please call the Imaging Department at your exam site with any questions.            Apr 23, 2018 10:30 AM CDT   FULL PULMONARY FUNCTION with  PFL D   [facility] Pulmonary Function Testing ([facility])    [address] [place] [id number] [phone]            Apr 23, 2018  2:30 PM CDT   (Arrive by 2:15 PM)   NEW PANCREAS/KIDNEY TRANSPLANT WORK-UP with Milton Guadarrama MD   [facility] ([facility])    [address] [place] [address]  [address] [place] [id number] [phone]              Who to contact     If you have questions or need follow up information about today's clinic visit or your schedule please contact [facility]PL[facility] directly at [phone].  Normal or non-critical lab and imaging results will be communicated to you by  "MyChart, letter or phone within 4 business days after the clinic has received the results. If you do not hear from us within 7 days, please contact the clinic through [employer] or phone. If you have a critical or abnormal lab result, we will notify you by phone as soon as possible.  Submit refill requests through [employer] or call your pharmacy and they will forward the refill request to us. Please allow 3 business days for your refill to be completed.          Additional Information About Your Visit        [employer] Information     [employer] lets you send messages to your doctor, view your test results, renew your prescriptions, schedule appointments and more. To sign up, go to [email or web address] . Click on \"Log in\" on the left side of the screen, which will take you to the Welcome page. Then click on \"Sign up Now\" on the right side of the page.     You will be asked to enter the access code listed below, as well as some personal information. Please follow the directions to create your username and password.     Your access code is: [email or web address]  Expires: 2018  4:04 PM     Your access code will  in 90 days. If you need help or a new code, please call your [facility] clinic or [phone].        Care EveryWhere ID     This is your Care EveryWhere ID. This could be used by other organizations to access your [facility] medical records  [id number]         Blood Pressure from Last 3 Encounters:   18 159/52   18 137/70   18 (P) 150/60    Weight from Last 3 Encounters:   18 84.4 kg (186 lb)   18 86.6 kg (191 lb)   18 (P) 86.6 kg (191 lb)              Today, you had the following     No orders found for display       Primary Care Provider Office Phone # Fax #    Noam Jackson [phone]       [address] [address]        Equal Access to Services     KALYANI WARREN : Hadii danisha Lam, kristin crocker, karina ko " madeline parisitere pittaan ah. So [facility] [phone].    ATENCIÓN: Si habla don, tiene a santoro disposición servicios gratuitos de asistencia lingüística. Renuka al [phone].    We comply with applicable federal civil rights laws and [place] laws. We do not discriminate on the basis of race, color, national origin, age, disability, sex, sexual orientation, or gender identity.            Thank you!     Thank you for choosing [facility] SOLID ORGAN TRANSPLANT  for your care. Our goal is always to provide you with excellent care. Hearing back from our patients is one way we can continue to improve our services. Please take a few minutes to complete the written survey that you may receive in the mail after your visit with us. Thank you!             Your Updated Medication List - Protect others around you: Learn how to safely use, store and throw away your medicines at www.disposemymeds.org.          This list is accurate as of 3/29/18  3:28 PM.  Always use your most recent med list.                   Brand Name Dispense Instructions for use Diagnosis    albuterol 108 (90 BASE) MCG/ACT Inhaler    PROAIR HFA/PROVENTIL HFA/VENTOLIN HFA    3 Inhaler    Inhale 2 puffs into the lungs every 6 hours as needed for shortness of breath / dyspnea or wheezing    Cough       ASPIRIN NOT PRESCRIBED    INTENTIONAL    0 each    1 each continuous prn Antiplatelet medication not prescribed intentionally due to current nosebleeds    Anemia due to blood loss, acute       atorvastatin 20 MG tablet    LIPITOR    90 tablet    Take 1 tablet (20 mg) by mouth daily    Hyperlipidemia LDL goal <100       * blood glucose monitoring test strip    ONETOUCH ULTRA    200 strip    Use to test blood sugars 2 times daily or as directed.    Type 2 diabetes mellitus with stage 3 chronic kidney disease, without long-term current use of insulin (H)       * blood glucose monitoring test strip    ONETOUCH ULTRA    200 strip    Test your  blood sugar 3-4 times per day.    Type 2 diabetes mellitus with hyperglycemia, with long-term current use of insulin (H)       CALCITRIOL PO      Take by mouth daily Unsure of dosage        carvedilol 12.5 MG tablet    COREG    60 tablet    Take 1 tablet (12.5 mg) by mouth 2 times daily (with meals)    Essential hypertension with goal blood pressure less than 140/90       ferrous sulfate 325 (65 FE) MG tablet    IRON    180 tablet    Take 1 tablet (325 mg) by mouth 2 times daily    Iron deficiency       * furosemide 40 MG tablet    LASIX    30 tablet    Take 1 tablet (40 mg) by mouth daily    Lymphedema of both lower extremities       * furosemide 20 MG tablet    LASIX    30 tablet    Take 1 tablet (20 mg) by mouth At Bedtime    Lymphedema of both lower extremities, Essential hypertension with goal blood pressure less than 140/90       insulin glargine 100 UNIT/ML injection    LANTUS    3 mL    Inject 20 Units Subcutaneous At Bedtime    Type 2 diabetes mellitus with diabetic neuropathy, with long-term current use of insulin (H)       insulin pen needle 31G X 6 MM    ULTICARE MINI    100 each    Use daily or as directed.    Type 2 diabetes, HbA1c goal < 7% (H)       lactobacillus rhamnosus (GG) capsule     60 capsule    Take 1 capsule by mouth 2 times daily    Cellulitis of right leg       miconazole 2 % powder    MICATIN; MICRO GUARD    71 g    Apply topically 2 times daily    Fungal dermatitis       MULTIVITAMIN PO      1 tablet by mouth daily        NovoLOG FLEXPEN 100 UNIT/ML injection   Generic drug:  insulin aspart     15 mL    Inject 4 units SQ with breakfast, lunch and dinner.    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    Edema of lower extremity       Urea 20 % Crea cream     85 g    Apply topically daily    Xerosis cutis, Tyloma       * Notice:  This list has 7 medication(s) that are the same as other medications prescribed for you. Read the directions carefully, and ask your doctor or other care provider to review them with you.

## 2018-03-29 NOTE — PROGRESS NOTES
Surgical Clinical Trials Office Notification of Study Randomization  Study Name: Randomized Controlled Trial to Evaluate the Effect of Lost Wage Reimbursement to Potential Kidney  Donors On Living Donation Rates (Donor Lost Wages Study) (IRB #[id number])    ICF Version Date / IRB Approval Date: 25-JUL-2017 / 01-DEC-2017    Date of Approach/Consent: 29 MAR 2018  Patient consented to participate in the Donor Lost Wages study and has been randomized to the Control arm of the study (donors ARE NOT eligible for wage reimbursement).  Patient has been informed of the results.

## 2018-03-29 NOTE — MR AVS SNAPSHOT
After Visit Summary   3/29/2018    Supriya Herr    MRN: [id number]           Patient Information     Date Of Birth          1950        Visit Information        Provider Department      3/29/2018 11:45 AM Magalie Dillard RD [facility] Solid Organ Transplant        Today's Diagnoses     Organ transplant candidate    -  1       Follow-ups after your visit        Your next 10 appointments already scheduled     Mar 29, 2018  2:00 PM CDT   Lab with UC LAB   [facility] ([facility])    [address]  1st Floor  [facility] [id number] [phone]            Mar 29, 2018  2:40 PM CDT   (Arrive by 2:25 PM)   ecg with  CV EKG   [facility] X[facility] ([facility])    [address] [place] [address]  1st Floor  [facility] [id number] [phone]            Mar 30, 2018  1:00 PM CDT   PHYSICAL with Noam Jackson MD   [facility] ([facility]    [address] [place] [address]  [address]  [facility] [id number] [phone]            Apr 03, 2018 12:30 PM CDT   (Arrive by 12:15 PM)   NEW FOOT/ANKLE with Alvaro Gautam MD   [facility] ([facility])    [address] [place] [address]  4th Floor  [facility] [id number] [phone]            Apr 11, 2018  9:45 AM CDT   Lab with UC LAB   [facility] ([facility])    [address] [place] [address]  1st Floor  [facility] [id number] [phone]            Apr 11, 2018 10:45 AM CDT   (Arrive by 10:15 AM)   Return Visit with Kathryn Basilio MD   [facility] Nephrology ([facility])    [address] [place] [address]  [address]  [facility] [id number] [phone]            Apr 23, 2018  8:30 AM CDT   US AORTIC IMAGING with UCUSV2   [facility] US ([facility])    [address] [place] [address]  1st Floor  [facility] [address]    [phone]           Please bring a list of your medicines (including vitamins, minerals and over-the-counter drugs). Also, tell your doctor about any allergies you may have. Wear comfortable clothes and leave your valuables at home.  Adults: No eating or drinking for 8 hours before the exam. You may take medicine with a small sip of water.  Children: - Children 6+ years: No food or drink for 6 hours before exam. - Children 1-5 years: No food or drink for 4 hours before exam. - Infants, breast-fed: may have breast milk up to 2 hours before exam. - Infants, formula: may have bottle until 4 hours before exam.  Please call the Imaging Department at your exam site with any questions.            Apr 23, 2018  9:00 AM CDT   US AORTA/IVC/ILIAC DUPLEX COMPLETE with UCUSV2   [facility] US ([facility])    [address] [place] [address] [phone]           Please bring a list of your medicines (including vitamins, minerals and over-the-counter drugs). Also, tell your doctor about any allergies you may have. Wear comfortable clothes and leave your valuables at home.  Adults: No eating or drinking for 8 hours before the exam. You may take medicine with a small sip of water.  Children: - Children 6+ years: No food or drink for 6 hours before exam. - Children 1-5 years: No food or drink for 4 hours before exam. - Infants, breast-fed: may have breast milk up to 2 hours before exam. - Infants, formula: may have bottle until 4 hours before exam.  Please call the Imaging Department at your exam site with any questions.            Apr 23, 2018 10:30 AM CDT   FULL PULMONARY FUNCTION with  PFL D   [facility] Pulmonary Function Testing ([facility])    [address] [place] [address] [phone]            Apr 23, 2018  2:30 PM CDT   (Arrive by 2:15 PM)   NEW PANCREAS/KIDNEY TRANSPLANT WORK-UP with Milton GARDNER  "MD Chiara   [facility] ([facility])    [address]  [address] [place] [address] [phone]              Who to contact     If you have questions or need follow up information about today's clinic visit or your schedule please contact [facility] SOLID ORGAN TRANSPLANT directly at [phone].  Normal or non-critical lab and imaging results will be communicated to you by MyChart, letter or phone within 4 business days after the clinic has received the results. If you do not hear from us within 7 days, please contact the clinic through MyChart or phone. If you have a critical or abnormal lab result, we will notify you by phone as soon as possible.  Submit refill requests through [employer] or call your pharmacy and they will forward the refill request to us. Please allow 3 business days for your refill to be completed.          Additional Information About Your Visit        [employer] Information     [employer] lets you send messages to your doctor, view your test results, renew your prescriptions, schedule appointments and more. To sign up, go to [email or web address] . Click on \"Log in\" on the left side of the screen, which will take you to the Welcome page. Then click on \"Sign up Now\" on the right side of the page.     You will be asked to enter the access code listed below, as well as some personal information. Please follow the directions to create your username and password.     Your access code is: [email or web address]  Expires: 2018  4:04 PM     Your access code will  in 90 days. If you need help or a new code, please call your [place] clinic or [phone].        Care EveryWhere ID     This is your Care EveryWhere ID. This could be used by other organizations to access your [facility] medical records  [id number]         Blood Pressure from Last 3 Encounters:   18 159/52   18 137/70   18 (P) 150/60    Weight from Last 3 Encounters: "   03/29/18 84.4 kg (186 lb)   03/02/18 86.6 kg (191 lb)   03/02/18 (P) 86.6 kg (191 lb)              Today, you had the following     No orders found for display       Primary Care Provider Office Phone # Fax #    Noam Luis Jackson [phone]       [address] [facility] [address]  [facility] [address]        Equal Access to Services     [facility] : Hadii aad ku hadasho Soomaali, waaxda luqadaha, qaybta kaalmada adeegyada, waxay idiin hayaan adeeg kharash la'darcyn . So [facility] [phone].    ATENCIÓN: Si mariyala espbernardo, tiene a santoro disposición servicios gratuitos de asistencia lingüística. [facility] [phone].    We comply with applicable federal civil rights laws and [place] laws. We do not discriminate on the basis of race, color, national origin, age, disability, sex, sexual orientation, or gender identity.            Thank you!     Thank you for choosing [facility] SOLID ORGAN TRANSPLANT  for your care. Our goal is always to provide you with excellent care. Hearing back from our patients is one way we can continue to improve our services. Please take a few minutes to complete the written survey that you may receive in the mail after your visit with us. Thank you!             Your Updated Medication List - Protect others around you: Learn how to safely use, store and throw away your medicines at www.disposemymeds.org.          This list is accurate as of 3/29/18  1:49 PM.  Always use your most recent med list.                   Brand Name Dispense Instructions for use Diagnosis    albuterol 108 (90 BASE) MCG/ACT Inhaler    PROAIR HFA/PROVENTIL HFA/VENTOLIN HFA    3 Inhaler    Inhale 2 puffs into the lungs every 6 hours as needed for shortness of breath / dyspnea or wheezing    Cough       ASPIRIN NOT PRESCRIBED    INTENTIONAL    0 each    1 each continuous prn Antiplatelet medication not prescribed intentionally due to current nosebleeds    Anemia due to blood loss, acute       atorvastatin 20 MG  tablet    LIPITOR    90 tablet    Take 1 tablet (20 mg) by mouth daily    Hyperlipidemia LDL goal <100       * blood glucose monitoring test strip    ONETOUCH ULTRA    200 strip    Use to test blood sugars 2 times daily or as directed.    Type 2 diabetes mellitus with stage 3 chronic kidney disease, without long-term current use of insulin (H)       * blood glucose monitoring test strip    ONETOUCH ULTRA    200 strip    Test your blood sugar 3-4 times per day.    Type 2 diabetes mellitus with hyperglycemia, with long-term current use of insulin (H)       CALCITRIOL PO      Take by mouth daily Unsure of dosage        carvedilol 12.5 MG tablet    COREG    60 tablet    Take 1 tablet (12.5 mg) by mouth 2 times daily (with meals)    Essential hypertension with goal blood pressure less than 140/90       ferrous sulfate 325 (65 FE) MG tablet    IRON    180 tablet    Take 1 tablet (325 mg) by mouth 2 times daily    Iron deficiency       * furosemide 40 MG tablet    LASIX    30 tablet    Take 1 tablet (40 mg) by mouth daily    Lymphedema of both lower extremities       * furosemide 20 MG tablet    LASIX    30 tablet    Take 1 tablet (20 mg) by mouth At Bedtime    Lymphedema of both lower extremities, Essential hypertension with goal blood pressure less than 140/90       insulin glargine 100 UNIT/ML injection    LANTUS    3 mL    Inject 20 Units Subcutaneous At Bedtime    Type 2 diabetes mellitus with diabetic neuropathy, with long-term current use of insulin (H)       insulin pen needle 31G X 6 MM    ULTICARE MINI    100 each    Use daily or as directed.    Type 2 diabetes, HbA1c goal < 7% (H)       lactobacillus rhamnosus (GG) capsule     60 capsule    Take 1 capsule by mouth 2 times daily    Cellulitis of right leg       miconazole 2 % powder    MICATIN; MICRO GUARD    71 g    Apply topically 2 times daily    Fungal dermatitis       MULTIVITAMIN PO      1 tablet by mouth daily        NovoLOG FLEXPEN 100 UNIT/ML injection    Generic drug:  insulin aspart     15 mL    Inject 4 units SQ with breakfast, lunch and dinner.    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    Edema of lower extremity       Urea 20 % Crea cream     85 g    Apply topically daily    Xerosis cutis, Tyloma       * Notice:  This list has 7 medication(s) that are the same as other medications prescribed for you. Read the directions carefully, and ask your doctor or other care provider to review them with you.

## 2018-03-29 NOTE — PROGRESS NOTES
Outpatient MNT: Kidney Transplant Evaluation    Current BMI: 30.25 (HT 65.75 in,  lbs/84 kg)  BMI is within criteria of <35 for kidney transplant     Time Spent: 30 minutes  Visit Type: F/U (last seen by OP RD 2/1/18 for renal diet ed)  Referring Physician: Dr Dennis  Pt accompanied by: her daughter, Caroline     History of previous txp: none  Dialysis: no    Nutrition Assessment  Pt continues living with daughter, who does majority of cooking, yet pt is on her own during the day for her own food choices. Since meeting with pt 2/1/18, she has made significant changes to accommodate a renal diet.     Appetite: reduced mostly in part d/t renal diet restrictions     Vitamins, Supplements, Pertinent Meds: iron, MVI, culturelle   Herbal Medicines/Supplements: none     Diet Recall  Breakfast Cream of wheat or eggs with occasional toast with berries or toast with unsalted butter    Lunch Tuna s/w or LS turkey s/w or salad with lettuce, cucumbers, and a few tomatoes   Dinner S/w, eggs, lean turkey burgers, sauteed peppers with pork, spaghetti with no added salt meat sauce    Snacks LS popcorn, fruit bars   Beverages Water    Alcohol None    Dining out A few times/week, mostly at [facility] with her friends but asks for no added salt      Physical Activity  None, w/c bound      Anthropometrics  Height:   65.75 in   BMI:    30.25    Weight Status:Obesity Grade I BMI 30-34.9   Weight:  186 lbs            IBW (lb): 129  % IBW: 144    Wt Hx: Pt's weight down ~15 lbs x 2 months. Most likely this is d/t renal diet restrictions.     Adj/dosing BW: 143 lbs/65 kg       Labs  Recent Labs   Lab Test  09/21/17   1439  08/03/16   1041  02/20/15   1139   12/08/11   1358   CHOL  172  284*  214*   --   148   HDL  46*  49*  48*   --   55   LDL  68  185*  116   < >  68   TRIG  288*  248*  249*   --   124   CHOLHDLRATIO   --    --   4.5   --   3.0    < > = values in this interval not displayed.     Lab Results   Component Value Date    A1C 6.8  01/09/2018    A1C 9.6 06/09/2017    A1C 6.9 02/14/2017    A1C 8.6 11/21/2016    A1C 11.1 08/25/2016     Potassium   Date Value Ref Range Status   02/21/2018 3.9 3.4 - 5.3 mmol/L Final     PHOSPHORUS: 4.3 on 2/21/18     Malnutrition  % Intake: Decreased intake does not meet criteria for malnutrition   % Weight Loss: Weight loss does not meet criteria for malnutrition   Subcutaneous Fat Loss: None  Muscle Loss: None  Fluid Accumulation/Edema: None noted  Malnutrition Diagnosis: Patient does not meet two of the above criteria necessary for diagnosing malnutrition     Estimated Nutrition Needs  Energy  5499-2169     (25-30 kcal/kg for maintenance)     Protein  39-52    (0.6-0.8 g/kg for CKD)           Fluid  1 ml/kcal or per MD   Micronutrient   Na+: <2000 mg/day  K+: 9869-3240 mg/day  Phos: 800-1000 mg/day            Nutrition Diagnosis  Food and nutrition related knowledge deficit r/t pre kidney transplant eval AEB pt verbalized not hearing pre/post transplant diet guidelines.    Nutrition Intervention  Nutrition education provided:  Discussed sodium intake (low sodium foods and drinks, seasoning food without salt and tips for low sodium diet). Pt doing well with this and reinforced ok to eat out once in a while with higher sodium items as she is strictly restricting her sodium intake. Reviewed prev WNL K+/Phos labs, which she will have repeated today.     Reviewed post txp diet guidelines in brief (will review in further detail post txp):  (1) Review of proper food safety measures d/t immunosuppressant therapy post-op and increased risk for food-borne illness   (2) Stressed importance of not taking any herbal/Chinese/alternative medicines or supplements post txp (d/t risk for rejection, unknown effects on the organs, potential interactions with immunosuppresants).   (3) Med regimen and possible side effects    Patient Understanding: Pt verbalized understanding of education provided.  Expected Compliance:  Good  Follow-Up Plans: PRN     Nutrition Goals  1. Limit Na+ <2000mg/day  2. Pt to verbalize understanding of 3 aspects of post txp education provided  3. Limit high K+/Phos foods pending lab values    Provided pt with contact info.   Magalie Dillard RD, LD  Pgr [phone]

## 2018-03-29 NOTE — LETTER
3/29/2018       RE: Supriya Herr  [address]  [facility] [address]     Dear Colleague,    Thank you for referring your patient, Supriya Herr, to the [facility] SOLID [facility]GAN TRANSPL[facility] at [facility]. Please see a copy of my visit note below.    RE: Supriya Herr    [facility]# [id number]        I saw your patient, Supriya Herr, in consultation in our pretransplant clinic.  She was at clinic to discuss care for her end-stage renal disease.  She was at clinic with her daughter.  Prior to clinic, they attended our pretransplant class.       We talked about the pros and cons of transplantation vs. dialysis.  We discussed the fact that it was important that she think about the pros and cons of each treatment option and make an active decision.  We also discussed the fact that the two were interconnected and she may need to go on dialysis before transplant (if she chose to have a transplant) and that if the transplant failed, she might need dialysis before another transplant.       We also discussed the fact that if she chose to have a transplant, she would need to decide between going on the wait list for a  donor transplant vs. having a living donor transplant.  We talked about the pros and cons of each option.  Although I didn't express an opinion regarding transplantation or dialysis, I suggested that if she chose to have a transplant, a living donor transplant would be preferable in that the surgery is the same, the immunosuppressive drugs and the risks are the same, but the transplant could be done sooner and the results are better.  I told her that the wait for  donor kidney was approximately five years for patients who are newly put on the waiting list.  In addition, we talked about the fact that the disadvantage of a living donor transplant was the risk to the donor.       Finally, the daughter's interested in being a donor. She had a number of  questions regarding donation. Because she was interested in living donation, we spent some time discussing donor risks, including the risks of mortality, morbidity, and long-term risks with a single kidney. I also provided them with a copy of our donor DVD to view at their leisure.     I attempted to answer any remaining questions.  I also told her that should she have any questions, she should feel free to contact us.  We would be glad to answer any questions either over the phone or at another clinic visit.  Her transplant coordinator is Britt Farmer and may be reached at [phone].  Thank you for the opportunity to see her.     I spent 40 minutes with this patient.  Over 90% of that time was spent in counseling and coordination of care.             Yours truly,               Jourdan Dennis MD         Professor of Surgery         ([phone])    [facility]/

## 2018-03-29 NOTE — MR AVS SNAPSHOT
After Visit Summary   3/29/2018    Supriya Herr    MRN: [id number]           Patient Information     Date Of Birth          1950        Visit Information        Provider Department      3/29/2018 12:54 PM Specialty, Provider [facility], [place],  Clinical Research        Today's Diagnoses     Examination of participant in clinical trial    -  1       Follow-ups after your visit        Your next 10 appointments already scheduled     Mar 29, 2018  1:00 PM CDT   Ech Complete with UCECHCR2   [facility] Echo ([facility])    [address] [place] [address]  3rd [facility] [id number] [phone]           1. Please bring or wear a comfortable two-piece outfit. 2. You may eat, drink and take your normal medicines. 3. For any questions that cannot be answered, please contact the ordering physician            Mar 29, 2018  2:00 PM CDT   Lab with UC LAB   [facility] Lab ([facility])    [address] [place] [address]  1st [facility] [address] [phone]            Mar 29, 2018  2:40 PM CDT   (Arrive by 2:25 PM)   ecg with  CV EKG   [facility] Xray ([facility])    [address] [place] [address]  1st [facility] [address] [phone]            Mar 30, 2018  1:00 PM CDT   PHYSICAL with Noam Jcakson MD   [facility]    [address] [place] [id number] [phone]            Apr 03, 2018 12:30 PM CDT   (Arrive by 12:15 PM)   NEW FOOT/ANKLE with Alvaro aGutam MD   [facility] ([facility])    [address] [place] [address]  4th [facility] [address] [phone]            Apr 11, 2018  9:45 AM CDT   Lab with UC LAB    [facility] ([facility])    [address] [place] [address]  1st [facility] [address] [phone]            Apr 11, 2018 10:45 AM CDT    (Arrive by 10:15 AM)   Return Visit with Kathryn Basilio MD   [facility] Nephrology ([facility])    [address] [facility]  [address]  [facility] [id number] [phone]            Apr 23, 2018  8:30 AM CDT   US AORTIC IMAGING with UCUSV2   [facility] US ([facility])    [address] [id number] [phone]           Please bring a list of your medicines (including vitamins, minerals and over-the-counter drugs). Also, tell your doctor about any allergies you may have. Wear comfortable clothes and leave your valuables at home.  Adults: No eating or drinking for 8 hours before the exam. You may take medicine with a small sip of water.  Children: - Children 6+ years: No food or drink for 6 hours before exam. - Children 1-5 years: No food or drink for 4 hours before exam. - Infants, breast-fed: may have breast milk up to 2 hours before exam. - Infants, formula: may have bottle until 4 hours before exam.  Please call the Imaging Department at your exam site with any questions.            Apr 23, 2018  9:00 AM CDT   US AORTA/IVC/ILIAC DUPLEX COMPLETE with UCUSV2   [facility] US ([facility])    [address] [id number] [phone]           Please bring a list of your medicines (including vitamins, minerals and over-the-counter drugs). Also, tell your doctor about any allergies you may have. Wear comfortable clothes and leave your valuables at home.  Adults: No eating or drinking for 8 hours before the exam. You may take medicine with a small sip of water.  Children: - Children 6+ years: No food or drink for 6 hours before exam. - Children 1-5 years: No food or drink for 4 hours before exam. - Infants, breast-fed: may have breast milk up to 2 hours before exam. - Infants, formula: may have bottle until 4 hours before exam.  Please call the  "Imaging Department at your exam site with any questions.            2018  2:30 PM CDT   (Arrive by 2:15 PM)   NEW PANCREAS/KIDNEY TRANSPLANT WORK-UP with Milton Guadarrama MD   [facility] ([facility])    [address] [facility]  [address] [place] [address] [phone]              Who to contact     If you have questions or need follow up information about today's clinic visit or your schedule please [facility] directly at [phone].  Normal or non-critical lab and imaging results will be communicated to you by This[employer], letter or phone within 4 business days after the clinic has received the results. If you do not hear from us within 7 days, please contact the clinic through [employer] or phone. If you have a critical or abnormal lab result, we will notify you by phone as soon as possible.  Submit refill requests through [employer] or call your pharmacy and they will forward the refill request to us. Please allow 3 business days for your refill to be completed.          Additional Information About Your Visit        [employer] Information     [employer] lets you send messages to your doctor, view your test results, renew your prescriptions, schedule appointments and more. To sign up, go to [email or web address] . Click on \"Log in\" on the left side of the screen, which will take you to the Welcome page. Then click on \"Sign up Now\" on the right side of the page.     You will be asked to enter the access code listed below, as well as some personal information. Please follow the directions to create your username and password.     Your access code is: [email or web address]  Expires: 2018  4:04 PM     Your access code will  in 90 days. If you need help or a new code, please call your [facility] clinic or [phone].        Care EveryWhere ID     This is your Care EveryWhere ID. This could be used by other organizations to access your [facility] " medical records  [id number]         Blood Pressure from Last 3 Encounters:   03/29/18 159/52   03/02/18 137/70   03/02/18 (P) 150/60    Weight from Last 3 Encounters:   03/29/18 84.4 kg (186 lb)   03/02/18 86.6 kg (191 lb)   03/02/18 (P) 86.6 kg (191 lb)              Today, you had the following     No orders found for display       Primary Care Provider Office Phone # Fax #    Noam Luis Jackson [phone]       [address]  [facility] [address]        Equal Access to Services     [facility]: Hadii danisha jacobson hadmicaelao Soaleksandar, waaxda luqadaha, qaybta kaalmada travisda, madeilne sood . So [facility] [phone].    ATENCIÓN: Si habla español, tiene a santoro disposición servicios gratuitos de asistencia lingüística. [facility] [phone].    We comply with applicable federal civil rights laws and [place] laws. We do not discriminate on the basis of race, color, national origin, age, disability, sex, sexual orientation, or gender identity.            Thank you!     Thank you for choosing [facility]  for your care. Our goal is always to provide you with excellent care. Hearing back from our patients is one way we can continue to improve our services. Please take a few minutes to complete the written survey that you may receive in the mail after your visit with us. Thank you!             Your Updated Medication List - Protect others around you: Learn how to safely use, store and throw away your medicines at www.disposemymeds.org.          This list is accurate as of 3/29/18 12:56 PM.  Always use your most recent med list.                   Brand Name Dispense Instructions for use Diagnosis    albuterol 108 (90 BASE) MCG/ACT Inhaler    PROAIR HFA/PROVENTIL HFA/VENTOLIN HFA    3 Inhaler    Inhale 2 puffs into the lungs every 6 hours as needed for shortness of breath / dyspnea or wheezing    Cough       ASPIRIN NOT PRESCRIBED    INTENTIONAL     0 each    1 each continuous prn Antiplatelet medication not prescribed intentionally due to current nosebleeds    Anemia due to blood loss, acute       atorvastatin 20 MG tablet    LIPITOR    90 tablet    Take 1 tablet (20 mg) by mouth daily    Hyperlipidemia LDL goal <100       * blood glucose monitoring test strip    ONETOUCH ULTRA    200 strip    Use to test blood sugars 2 times daily or as directed.    Type 2 diabetes mellitus with stage 3 chronic kidney disease, without long-term current use of insulin (H)       * blood glucose monitoring test strip    ONETOUCH ULTRA    200 strip    Test your blood sugar 3-4 times per day.    Type 2 diabetes mellitus with hyperglycemia, with long-term current use of insulin (H)       CALCITRIOL PO      Take by mouth daily Unsure of dosage        carvedilol 12.5 MG tablet    COREG    60 tablet    Take 1 tablet (12.5 mg) by mouth 2 times daily (with meals)    Essential hypertension with goal blood pressure less than 140/90       ferrous sulfate 325 (65 FE) MG tablet    IRON    180 tablet    Take 1 tablet (325 mg) by mouth 2 times daily    Iron deficiency       * furosemide 40 MG tablet    LASIX    30 tablet    Take 1 tablet (40 mg) by mouth daily    Lymphedema of both lower extremities       * furosemide 20 MG tablet    LASIX    30 tablet    Take 1 tablet (20 mg) by mouth At Bedtime    Lymphedema of both lower extremities, Essential hypertension with goal blood pressure less than 140/90       insulin glargine 100 UNIT/ML injection    LANTUS    3 mL    Inject 20 Units Subcutaneous At Bedtime    Type 2 diabetes mellitus with diabetic neuropathy, with long-term current use of insulin (H)       insulin pen needle 31G X 6 MM    ULTICARE MINI    100 each    Use daily or as directed.    Type 2 diabetes, HbA1c goal < 7% (H)       lactobacillus rhamnosus (GG) capsule     60 capsule    Take 1 capsule by mouth 2 times daily    Cellulitis of right leg       miconazole 2 % powder    MICATIN;  MICRO GUARD    71 g    Apply topically 2 times daily    Fungal dermatitis       MULTIVITAMIN PO      1 tablet by mouth daily        NovoLOG FLEXPEN 100 UNIT/ML injection   Generic drug:  insulin aspart     15 mL    Inject 4 units SQ with breakfast, lunch and dinner.    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    Edema of lower extremity       Urea 20 % Crea cream     85 g    Apply topically daily    Xerosis cutis, Tyloma       * Notice:  This list has 7 medication(s) that are the same as other medications prescribed for you. Read the directions carefully, and ask your doctor or other care provider to review them with you.
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
## 2018-03-29 NOTE — PROGRESS NOTES
Surgical Clinical Trials Office Notification of Study Eligibility  Study Name: Randomized Controlled Trial to Evaluate the Effect of Lost Wage Reimbursement to Potential Kidney  Donors On Living Donation Rates (Donor Lost Wages Study) (IRB #[id number])    ICF Version Date / IRB Approval Date: 25-JUL-2017 / 01-DEC-2017    Date of Approach/Consent: 29 MAR 2018    The subject was screened and meets all of the inclusion criteria.   The subject was told:  -that the study involves research  -the purpose of the research study  -the expected duration of the study and the approximate number of subject sought  -of any benefits to the subject or others that may be expected from the research  -how the confidentiality of records would be maintained  -who to contact if they have questions related to the research study or questions regarding research subjects' rights  -that participation is completely voluntary and that their decision to or not to participate will have no impact on their relationships with the N and the staff    The use of historical information (lab or assessments) used for the purpose of the study was approved by subject.  The subject was fully aware that we would be reviewing their medical record for the study.  The subject demonstrated an understanding of what the study involved.  Specifically, how this study differed from standard of care at our center and what was required of the subject as part of the study.  The subject reviewed the consent form and was given the opportunity to ask questions before signing.  Questions and concerns were answered by the study staff and/or study physician.  A copy of the signed informed consent document was provided to the subject.    The consent require the use of a :   [] Yes [x]  No     A 'short form' consent was used:     [] Yes [x] No         If yes, provide name of witness: N/A  The subject required a legally-authorized representative (LAR) to sign on  their behalf:                                                    [] Yes  [x] No   If yes, please record name of LAR: N/A    Questions to Evaluate Subject Comprehension of Study  Adult or Legally Authorized Representative (LAR) for a Dependent:  Question: Adequate Response? If No, explain what actions were taken   What is the purpose of this study and how long will it take? [x] Yes  [] No   Will you be required to do anything extra during the course of the study? [x] Yes  [] No   What risks are involved in participating in this study? [x] Yes  [] No

## 2018-03-29 NOTE — PROGRESS NOTES
Patient was seen by myself, Dr. Hugo Lopez, in conjunction with Aileen Lorenzana, as part of a shared visit.    I personally reviewed past medical and surgical history, vital signs, medications and labs.  Present and past medical history, along with significant physical exam findings were all reviewed with OLVIN.    My jenkins findings:  Supriya Herr is a 68 year old year old female with CKD from Type 2 Diabetes, who presents for Kidney transplant evaluation.    CKD: x 10 years thought due to DMII s/p     DMII: x 20 years. On insulin. Last a1c ok. 30 units of insulin.    CAD risk: CHF with preserved EF. ECHO today. Poor exercise tolerance.  prior stress testing long ago.     Ca screen: due for mammo, colonscopy. PCP to determine pap need.     Compliance improved.    L AKA traumatic etiology.     Smoking hx for 50 years. 1/2 ppd. Hx of COPD, getting pfts.    Derm: hx of BCC on ankle.     intermittent cp at rest, sob, no n/v/d, no f/s/c.     Some unintended weight loss, but still bmi 30.     Key management decisions made by me and discussed with OLVIN:  1. Kidney transplant evaluation - patient is a fair candidate overall. Benefits of a living donor transplant were discussed.  The patient's daughter who accompanied her today was quite interested in details regarding living donation.  I recommended that she either logon to [email or web address] or call her living donor coordinators.    As the patient is not yet on dialysis we will list her as inactive until she has completed her workup.    2. CKD from Type 2 Diabetes: Continue with blood pressure control and diabetes control with her primary care provider and primary nephrologist.  3. CAD risk: needs cardio and stress testing.   4. Ca risk: due for colonoscopy / mammo.   5. COPD / smoking hx: repeat pfts. Low dose CT chest to screen for lung ca through PCP  6. Hx of BCC: f/u derm

## 2018-03-29 NOTE — PROGRESS NOTES
RE: Supriya Herr    [facility]# [id number]        I saw your patient, Supriya Herr, in consultation in our pretransplant clinic.  She was at clinic to discuss care for her end-stage renal disease.  She was at clinic with her daughter.  Prior to clinic, they attended our pretransplant class.       We talked about the pros and cons of transplantation vs. dialysis.  We discussed the fact that it was important that she think about the pros and cons of each treatment option and make an active decision.  We also discussed the fact that the two were interconnected and she may need to go on dialysis before transplant (if she chose to have a transplant) and that if the transplant failed, she might need dialysis before another transplant.       We also discussed the fact that if she chose to have a transplant, she would need to decide between going on the wait list for a  donor transplant vs. having a living donor transplant.  We talked about the pros and cons of each option.  Although I didn't express an opinion regarding transplantation or dialysis, I suggested that if she chose to have a transplant, a living donor transplant would be preferable in that the surgery is the same, the immunosuppressive drugs and the risks are the same, but the transplant could be done sooner and the results are better.  I told her that the wait for  donor kidney was approximately five years for patients who are newly put on the waiting list.  In addition, we talked about the fact that the disadvantage of a living donor transplant was the risk to the donor.       Finally, the daughter's interested in being a donor. She had a number of questions regarding donation. Because she was interested in living donation, we spent some time discussing donor risks, including the risks of mortality, morbidity, and long-term risks with a single kidney. I also provided them with a copy of our donor DVD to view at their leisure.     I  attempted to answer any remaining questions.  I also told her that should she have any questions, she should feel free to contact us.  We would be glad to answer any questions either over the phone or at another clinic visit.  Her transplant coordinator is Britt Farmer and may be reached at [phone].  Thank you for the opportunity to see her.     I spent 40 minutes with this patient.  Over 90% of that time was spent in counseling and coordination of care.             Yours truly,               Jourdan Dennis MD         Professor of Surgery         ([phone])    [facility]/

## 2018-03-30 ENCOUNTER — OFFICE VISIT (OUTPATIENT)
Dept: FAMILY MEDICINE | Facility: CLINIC | Age: 68
End: 2018-03-30
Payer: MEDICARE

## 2018-03-30 ENCOUNTER — CARE COORDINATION (OUTPATIENT)
Dept: NEPHROLOGY | Facility: CLINIC | Age: 68
End: 2018-03-30

## 2018-03-30 VITALS
WEIGHT: 188 LBS | SYSTOLIC BLOOD PRESSURE: 136 MMHG | OXYGEN SATURATION: 98 % | DIASTOLIC BLOOD PRESSURE: 48 MMHG | BODY MASS INDEX: 30.58 KG/M2 | HEART RATE: 65 BPM | TEMPERATURE: 97.7 F

## 2018-03-30 DIAGNOSIS — E11.40 TYPE 2 DIABETES MELLITUS WITH DIABETIC NEUROPATHY, WITH LONG-TERM CURRENT USE OF INSULIN (H): ICD-10-CM

## 2018-03-30 DIAGNOSIS — Z12.31 VISIT FOR SCREENING MAMMOGRAM: Primary | ICD-10-CM

## 2018-03-30 DIAGNOSIS — Z12.4 SCREENING FOR MALIGNANT NEOPLASM OF CERVIX: ICD-10-CM

## 2018-03-30 DIAGNOSIS — F33.8 SEASONAL AFFECTIVE DISORDER (H): ICD-10-CM

## 2018-03-30 DIAGNOSIS — Z79.4 TYPE 2 DIABETES MELLITUS WITH DIABETIC NEUROPATHY, WITH LONG-TERM CURRENT USE OF INSULIN (H): ICD-10-CM

## 2018-03-30 DIAGNOSIS — F41.1 GAD (GENERALIZED ANXIETY DISORDER): ICD-10-CM

## 2018-03-30 DIAGNOSIS — Z11.1 SCREENING EXAMINATION FOR PULMONARY TUBERCULOSIS: ICD-10-CM

## 2018-03-30 DIAGNOSIS — L30.4 INTERTRIGO: ICD-10-CM

## 2018-03-30 DIAGNOSIS — N18.5 CKD (CHRONIC KIDNEY DISEASE) STAGE 5, GFR LESS THAN 15 ML/MIN (H): ICD-10-CM

## 2018-03-30 DIAGNOSIS — Z00.00 MEDICARE ANNUAL WELLNESS VISIT, SUBSEQUENT: ICD-10-CM

## 2018-03-30 PROBLEM — L03.90 CELLULITIS: Status: RESOLVED | Noted: 2017-11-01 | Resolved: 2018-03-30

## 2018-03-30 PROBLEM — L03.115 CELLULITIS OF RIGHT LEG: Status: RESOLVED | Noted: 2017-11-13 | Resolved: 2018-03-30

## 2018-03-30 PROBLEM — E87.5 HYPERKALEMIA: Status: RESOLVED | Noted: 2018-01-24 | Resolved: 2018-03-30

## 2018-03-30 LAB — INTERPRETATION ECG - MUSE: NORMAL

## 2018-03-30 PROCEDURE — G0145 SCR C/V CYTO,THINLAYER,RESCR: HCPCS | Performed by: FAMILY MEDICINE

## 2018-03-30 PROCEDURE — G0439 PPPS, SUBSEQ VISIT: HCPCS | Performed by: FAMILY MEDICINE

## 2018-03-30 PROCEDURE — 87624 HPV HI-RISK TYP POOLED RSLT: CPT | Performed by: FAMILY MEDICINE

## 2018-03-30 PROCEDURE — G0476 HPV COMBO ASSAY CA SCREEN: HCPCS | Performed by: FAMILY MEDICINE

## 2018-03-30 RX ORDER — BENZOCAINE/MENTHOL 6 MG-10 MG
LOZENGE MUCOUS MEMBRANE
Qty: 60 G | Refills: 3 | Status: SHIPPED | OUTPATIENT
Start: 2018-03-30 | End: 2018-07-03

## 2018-03-30 RX ORDER — CLOTRIMAZOLE 1 %
CREAM (GRAM) TOPICAL 2 TIMES DAILY
Qty: 60 G | Refills: 3 | Status: SHIPPED | OUTPATIENT
Start: 2018-03-30 | End: 2018-05-18

## 2018-03-30 RX ORDER — SERTRALINE HYDROCHLORIDE 25 MG/1
25 TABLET, FILM COATED ORAL DAILY
Qty: 30 TABLET | Refills: 3 | Status: SHIPPED | OUTPATIENT
Start: 2018-03-30 | End: 2018-05-18

## 2018-03-30 NOTE — PROGRESS NOTES
Reason for Call    Patient's daughter, Caroline, called to review lab results from yesterday. Creatinine is stable/ slightly improved. Potassium and bicarb WNL. Hgb stable.     Caroline reports patient is doing well with no new symptoms or concerns. They are trying to get patient approved for a day program so she can get out of the house more and stay active.     Patient had her transplant eval completed yesterday. If patient is a candidate, daughter is interested in being evaluated as a donor.    Patient will see Dr. Basilio in 2 weeks. Daughter will keep me updated on any symptom changes/ concerns before then if needed.    Pravin Cordova, RN, BAN  Nephrology RN Care Coordinator

## 2018-03-30 NOTE — LETTER
April 9, 2018      Supriya Herr  [address] [place] [id number]    Dear ,      I am happy to inform you that your cervical cancer screening test (PAP smear) was normal and your Human Papillomavirus (HPV) test was negative.    Per current guidelines, you no longer need to have pap smears completed.     Please continue to be seen every year for an annual wellness visit and other preventative tests.     Please contact my office at [phone] if you have further questions.    Sincerely,      Noam Jackson MD/[facility]

## 2018-03-30 NOTE — PROGRESS NOTES
SUBJECTIVE:   Supriya Herr is a 68 year old female who presents for Preventive Visit.    Are you in the first 12 months of your Medicare Part B coverage?  No    Healthy Habits:    Do you get at least three servings of calcium containing foods daily (dairy, green leafy vegetables, etc.)? no, taking calcium and/or vitamin D supplement: yes     Amount of exercise or daily activities, outside of work: 0 day(s) per week    Problems taking medications regularly No    Medication side effects: No    Have you had an eye exam in the past two years? yes    Do you see a dentist twice per year? no    Do you have sleep apnea, excessive snoring or daytime drowsiness?yes- sleep apnea but cannot wear mask      Ability to successfully perform activities of daily living: Yes, no assistance needed    Home safety:  none identified     Hearing impairment: No    Fall risk:  Fallen 2 or more times in the past year?: No  Any fall with injury in the past year?: No        COGNITIVE SCREEN  1) Repeat 3 items (Banana, Sunrise, Chair)    2) Clock draw: NORMAL  3) 3 item recall: Recalls 2 objects   Results: NORMAL clock, 1-2 items recalled: COGNITIVE IMPAIRMENT LESS LIKELY    Mini-CogTM Copyright S Demetri. Licensed by the author for use in [facility]; reprinted with permission (chuy@.[facility]). All rights reserved.            Hyperlipidemia Follow-Up      Rate your low fat/cholesterol diet?: good    Taking statin?  Yes, no muscle aches from statin    Other lipid medications/supplements?:  none    Hypertension Follow-up      Outpatient blood pressures are not being checked.    Low Salt Diet: no added salt    Vascular Disease Follow-up:  Coronary Artery and Peripheral Vascular Disease      Chest pain or pressure, left side neck or arm pain: No    Shortness of breath/increased sweats/nausea with exertion: No    Pain in calves walking 1-2 blocks: NA    Worsened or new symptoms since last visit: No    Nitroglycerin use: no    Daily  aspirin use: No    Anxiety Follow-Up    Status since last visit: Worsened over a couple of months, has history of SAD    Other associated symptoms:None    Complicating factors:   Significant life event: Yes-  Looking into Transplant/ diaylsis   Current substance abuse: None  Depression symptoms: No  NICOLE-7 SCORE 8/29/2011   Total Score 3       NICOLE-7  Chronic Kidney Disease Follow-up      Current NSAID use?  No    Encounter Diagnoses   Name Primary?     Medicare annual wellness visit, subsequent      Visit for screening mammogram Yes     CKD (chronic kidney disease) stage 5, GFR less than 15 ml/min (H)      Screening for malignant neoplasm of cervix, was told she needed to get this done prior to transplant ( althoug we usually do not do this after age 65)      NICOLE (generalized anxiety disorder)      Seasonal affective disorder (H)      Intertrigo rash in skin folds worse         Reviewed and updated as needed this visit by clinical staff  Allergies  Meds         Reviewed and updated as needed this visit by Provider        Social History   Substance Use Topics     Smoking status: Former Smoker     Packs/day: 0.50     Years: 52.00     Types: Cigarettes     Start date: 1/31/1964     Quit date: 11/1/2017     Smokeless tobacco: Never Used      Comment: 5 per day     Alcohol use No       If you drink alcohol do you typically have >3 drinks per day or >7 drinks per week? No                        Today's PHQ-2 Score:   PHQ-2 ( 1999 Pfizer) 3/30/2018 2/14/2017   Q1: Little interest or pleasure in doing things 1 0   Q2: Feeling down, depressed or hopeless 1 0   PHQ-2 Score 2 0       Do you feel safe in your environment - Yes    Do you have a Health Care Directive?: No: Advance care planning reviewed with patient; information given to patient to review.    Current providers sharing in care for this patient include:   Patient Care Team:  Noam Jackson MD as PCP - General (Family Practice)  Arabella Kelley,  APRN CNP as PCP - sparkle Renner RN CM [facility]  Noam Jackson MD as Referring Physician (Family Practice)  Kathryn Basilio MD as MD (Nephrology)  Pravin Cordova, SHANTA as Nurse Coordinator (Nephrology)    The following health maintenance items are reviewed in Epic and correct as of today:  Health Maintenance   Topic Date Due     HF ACTION PLAN Q3 YR  1950     COPD ACTION PLAN Q1 YR  1950     FIT Q1 YR  01/16/1959     MEDICARE ANNUAL WELLNESS VISIT  01/16/1967     DEPRESSION ACTION PLAN Q1 YR  01/16/1967     DEXA SCAN SCREENING (SYSTEM ASSIGNED)  01/16/2014     EYE EXAM Q1 YEAR  06/05/2015     ADVANCE DIRECTIVE PLANNING Q5 YRS  12/08/2016     PHQ-9 Q6 MONTHS  05/09/2017     PNEUMO 5YR BOOST DUE AFTER AGE 65 (NO IB MSG) (2) 08/27/2017     PNEUMOCOCCAL (2 of 2 - PPSV23) 08/27/2017     FALL RISK ASSESSMENT  02/14/2018     MAMMO SCREEN Q2 YR (SYSTEM ASSIGNED)  03/02/2018     FOOT EXAM Q1 YEAR  06/09/2018     INFLUENZA VACCINE (SYSTEM ASSIGNED)  09/01/2018     BMP Q6 MOS  09/29/2018     A1C Q6 MO  09/29/2018     MICROALBUMIN Q1 YEAR  01/09/2019     ALT Q1 YR  03/29/2019     BMP Q1 YR  03/29/2019     LIPID MONITORING Q1 YEAR  03/29/2019     CBC Q1 YR  03/29/2019     TSH W/ FREE T4 REFLEX Q2 YEAR  01/09/2020     TETANUS IMMUNIZATION (SYSTEM ASSIGNED)  07/18/2023     SPIROMETRY ONETIME  Completed     HEPATITIS C SCREENING  Completed     Labs reviewed in EPIC    Pneumonia Vaccine:Adults age 65+ who received Pneumovax (PPSV23) at 65 years or older: Should be given PCV13 > 1 year after their most recent PPSV23    ROS:  Constitutional, HEENT, cardiovascular, pulmonary, gi and gu systems are negative, except as otherwise noted.    OBJECTIVE:   /48 (BP Location: Left arm, Patient Position: Sitting, Cuff Size: Adult Large)  Pulse 65  Temp 97.7  F (36.5  C) (Oral)  Wt 188 lb (85.3 kg)  SpO2 98%  BMI 30.58 kg/m2 Estimated body mass index is 30.58 kg/(m^2) as calculated from the  "following:    Height as of 3/29/18: 5' 5.75\" (1.67 m).    Weight as of this encounter: 188 lb (85.3 kg).  EXAM:   GENERAL: alert, frail, elderly and fatigued  EYES: Eyes grossly normal to inspection, PERRL and conjunctivae and sclerae normal  HENT: ear canals and TM's normal, nose and mouth without ulcers or lesions  NECK: no adenopathy, no asymmetry, masses, or scars and thyroid normal to palpation  RESP: lungs clear to auscultation - no rales, rhonchi or wheezes  CV: regular rates and rhythm and normal S1 S2, no S3 or S4  ABDOMEN: soft, nontender, no hepatosplenomegaly, no masses and bowel sounds normal  MS: s/p left leg amputaion above knee  muscle wasting right leg  and wheelchair bound  SKIN: maculopapular hypopigmented patches under breast/ skin folds in  - abdomen  NEURO: mentation intact  : normal external genitalia with atropic changes, normal cervix/ pap sent  PSYCH: mentation appears normal, anxious, fatigued and judgement and insight intact    ASSESSMENT / PLAN:   1. Medicare annual wellness visit, subsequent  Moving toward transplant/ Stage 5 CKD  Paperwork signed for adult  and [facility]    2. Visit for screening mammogram  Will do  - MA SCREENING DIGITAL BILAT - Future  (s+30); Future    3. CKD (chronic kidney disease) stage 5, GFR less than 15 ml/min (H)  Follow up with consultant as planned.   - ACE/ARB/ARNI NOT PRESCRIBED, INTENTIONAL,; Please choose reason not prescribed, below    4. Screening for malignant neoplasm of cervix  Above typical age abut family insistent that Transplant team needs this cancer screening test so sent  - Pap imaged thin layer screen with HPV - recommended age 30 - 65 years (select HPV order below)  - HPV High Risk Types DNA Cervical    5. NICOLE (generalized anxiety disorder)  First try light then add  - sertraline (ZOLOFT) 25 MG tablet; Take 1 tablet (25 mg) by mouth daily  Dispense: 30 tablet; Refill: 3    6. Seasonal affective disorder (H)  worse  - order " "for DME; 1 SAD light  Dispense: 1 Device; Refill: 1    7. Intertrigo  use  - clotrimazole (LOTRIMIN) 1 % cream; Apply topically 2 times daily  Dispense: 60 g; Refill: 3  - hydrocortisone (CORTAID) 1 % cream; Apply sparingly to affected area two times daily for 14 days.  Dispense: 60 g; Refill: 3  If worse see DERM  8. Type 2 diabetes mellitus with diabetic neuropathy, with long-term current use of insulin (H)  stable      End of Life Planning:  Patient currently has an advanced directive: Yes.  Practitioner is supportive of decision.    COUNSELING:  Reviewed preventive health counseling, as reflected in patient instructions       Healthy diet/nutrition       Vision screening       Hearing screening       Dental care       Colon cancer screening        Estimated body mass index is 30.58 kg/(m^2) as calculated from the following:    Height as of 3/29/18: 5' 5.75\" (1.67 m).    Weight as of this encounter: 188 lb (85.3 kg).  Weight management plan: Discussed healthy diet and exercise guidelines and patient will follow up in 12 months in clinic to re-evaluate.     reports that she quit smoking about 4 months ago. Her smoking use included Cigarettes. She started smoking about 54 years ago. She has a 26.00 pack-year smoking history. She has never used smokeless tobacco.      Appropriate preventive services were discussed with this patient, including applicable screening as appropriate for cardiovascular disease, diabetes, osteopenia/osteoporosis, and glaucoma.  As appropriate for age/gender, discussed screening for colorectal cancer, prostate cancer, breast cancer, and cervical cancer. Checklist reviewing preventive services available has been given to the patient.    Reviewed patients plan of care and provided an AVS. The Intermediate Care Plan ( asthma action plan, low back pain action plan, and migraine action plan) for Supriya meets the Care Plan requirement. This Care Plan has been established and reviewed with the " Patient and daughter.    Counseling Resources:  ATP IV Guidelines  Pooled Cohorts Equation Calculator  Breast Cancer Risk Calculator  FRAX Risk Assessment  ICSI Preventive Guidelines  Dietary Guidelines for Americans, 2010  USDA's MyPlate  ASA Prophylaxis  Lung CA Screening    Noam Jackson MD  [facility]

## 2018-03-30 NOTE — NURSING NOTE
"Chief Complaint   Patient presents with     Medicare Visit     Forms     adult day program       Initial /48 (BP Location: Left arm, Patient Position: Sitting, Cuff Size: Adult Large)  Pulse 65  Temp 97.7  F (36.5  C) (Oral)  Wt 188 lb (85.3 kg)  SpO2 98%  BMI 30.58 kg/m2 Estimated body mass index is 30.58 kg/(m^2) as calculated from the following:    Height as of 3/29/18: 5' 5.75\" (1.67 m).    Weight as of this encounter: 188 lb (85.3 kg).  Medication Reconciliation: complete     Celestina Wei CMA    "

## 2018-03-30 NOTE — PATIENT INSTRUCTIONS
Preventive Health Recommendations  Female Ages 65 +    Yearly exam:     See your health care provider every year in order to  o Review health changes.   o Discuss preventive care.    o Review your medicines if your doctor has prescribed any.      You no longer need a yearly Pap test unless you've had an abnormal Pap test in the past 10 years. If you have vaginal symptoms, such as bleeding or discharge, be sure to talk with your provider about a Pap test.      Every 1 to 2 years, have a mammogram.  If you are over 69, talk with your health care provider about whether or not you want to continue having screening mammograms.      Every 10 years, have a colonoscopy. Or, have a yearly FIT test (stool test). These exams will check for colon cancer.       Have a cholesterol test every 5 years, or more often if your doctor advises it.       Have a diabetes test (fasting glucose) every three years. If you are at risk for diabetes, you should have this test more often.       At age 65, have a bone density scan (DEXA) to check for osteoporosis (brittle bone disease).    Shots:    Get a flu shot each year.    Get a tetanus shot every 10 years.    Talk to your doctor about your pneumonia vaccines. There are now two you should receive - Pneumovax (PPSV 23) and Prevnar (PCV 13).    Talk to your doctor about the shingles vaccine.    Talk to your doctor about the hepatitis B vaccine.    Nutrition:     Eat at least 5 servings of fruits and vegetables each day.      Eat whole-grain bread, whole-wheat pasta and brown rice instead of white grains and rice.      Talk to your provider about Calcium and Vitamin D.     Lifestyle    Exercise at least 150 minutes a week (30 minutes a day, 5 days a week). This will help you control your weight and prevent disease.      Limit alcohol to one drink per day.      No smoking.       Wear sunscreen to prevent skin cancer.       See your dentist twice a year for an exam and cleaning.      See your  eye doctor every 1 to 2 years to screen for conditions such as glaucoma, macular degeneration, cataracts, etc     Preventive Health Recommendations    Female Ages 65 +    Yearly exam:     See your health care provider every year in order to  o Review health changes.   o Discuss preventive care.    o Review your medicines if your doctor has prescribed any.      You no longer need a yearly Pap test unless you've had an abnormal Pap test in the past 10 years. If you have vaginal symptoms, such as bleeding or discharge, be sure to talk with your provider about a Pap test.      Every 1 to 2 years, have a mammogram.  If you are over 69, talk with your health care provider about whether or not you want to continue having screening mammograms.      Every 10 years, have a colonoscopy. Or, have a yearly FIT test (stool test). These exams will check for colon cancer.       Have a cholesterol test every 5 years, or more often if your doctor advises it.       Have a diabetes test (fasting glucose) every three years. If you are at risk for diabetes, you should have this test more often.       At age 65, have a bone density scan (DEXA) to check for osteoporosis (brittle bone disease).    Shots:    Get a flu shot each year.    Get a tetanus shot every 10 years.    Talk to your doctor about your pneumonia vaccines. There are now two you should receive - Pneumovax (PPSV 23) and Prevnar (PCV 13).    Talk to your doctor about the shingles vaccine.    Talk to your doctor about the hepatitis B vaccine.    Nutrition:     Eat at least 5 servings of fruits and vegetables each day.      Eat whole-grain bread, whole-wheat pasta and brown rice instead of white grains and rice.      Talk to your provider about Calcium and Vitamin D.     Lifestyle    Exercise at least 150 minutes a week (30 minutes a day, 5 days a week). This will help you control your weight and prevent disease.      Limit alcohol to one drink per day.      No smoking.        Wear sunscreen to prevent skin cancer.       See your dentist twice a year for an exam and cleaning.      See your eye doctor every 1 to 2 years to screen for conditions such as glaucoma, macular degeneration and cataracts.

## 2018-03-30 NOTE — PROGRESS NOTES
"   SUBJECTIVE:   CC: Supriya Herr is an 68 year old woman who presents for preventive health visit.     Healthy Habits:    Do you get at least three servings of calcium containing foods daily (dairy, green leafy vegetables, etc.)? {YES/NO, DAIRY INTAKE:096045::\"yes\"}    Amount of exercise or daily activities, outside of work: {AMOUNT EXERCISE:473437}    Problems taking medications regularly {Yes /No default:794232::\"No\"}    Medication side effects: {Yes /No default.:051071::\"No\"}    Have you had an eye exam in the past two years? {YESNOBLANK:482787}    Do you see a dentist twice per year? {YESNOBLANK:827720}    Do you have sleep apnea, excessive snoring or daytime drowsiness?{YESNOBLANK:821624}  {Outside tests to abstract? :917193}    {additional problems to add (Optional):910842}    Today's PHQ-2 Score:   PHQ-2 ( 1999 Pfizer) 2/14/2017 11/9/2016   Q1: Little interest or pleasure in doing things 0 0   Q2: Feeling down, depressed or hopeless 0 0   PHQ-2 Score 0 0     {PHQ-2 LOOK IN ASSESSMENTS (Optional) :412311}  Abuse: Current or Past(Physical, Sexual or Emotional)- {YES/NO/NA:013015}  Do you feel safe in your environment - {YES/NO/NA:736955}    Social History   Substance Use Topics     Smoking status: Former Smoker     Packs/day: 0.50     Years: 52.00     Types: Cigarettes     Start date: 1/31/1964     Quit date: 11/1/2017     Smokeless tobacco: Never Used      Comment: 5 per day     Alcohol use No     If you drink alcohol do you typically have >3 drinks per day or >7 drinks per week? {ETOH :159761}                     Reviewed orders with patient.  Reviewed health maintenance and updated orders accordingly - {Yes/No:510702::\"Yes\"}  {Chronicprobdata (Optional):593312}    {Mammo Decision Support (Optional):191352}    Pertinent mammograms are reviewed under the imaging tab.  History of abnormal Pap smear: {PAP HX:755126}    Reviewed and updated as needed this visit by clinical staff         Reviewed and updated " "as needed this visit by Provider        {HISTORY OPTIONS (Optional):432671}    ROS:  {FEMALE PREVENTATIVE ROS:274707}    OBJECTIVE:   There were no vitals taken for this visit.  EXAM:  {Exam Choices:257002}    ASSESSMENT/PLAN:   {Diag Picklist:478138}    COUNSELING:   {FEMALE COUNSELING MESSAGES:873015::\"Reviewed preventive health counseling, as reflected in patient instructions\"}    {BP Counseling- Complete if BP >= 120/80  (Optional):911158}     reports that she quit smoking about 4 months ago. Her smoking use included Cigarettes. She started smoking about 54 years ago. She has a 26.00 pack-year smoking history. She has never used smokeless tobacco.  {Tobacco Cessation -- Complete if patient is a smoker (Optional):482987}  Estimated body mass index is 30.25 kg/(m^2) as calculated from the following:    Height as of 3/29/18: 5' 5.75\" (1.67 m).    Weight as of 3/29/18: 186 lb (84.4 kg).   {Weight Management Plan (ACO) Complete if BMI is abnormal-  Ages 18-64  BMI >24.9.  Age 65+ with BMI <23 or >30 (Optional):571790}    Counseling Resources:  ATP IV Guidelines  Pooled Cohorts Equation Calculator  Breast Cancer Risk Calculator  FRAX Risk Assessment  ICSI Preventive Guidelines  Dietary Guidelines for Americans, 2010  USDA's MyPlate  ASA Prophylaxis  Lung CA Screening    Noam Jackson MD  [facility] [place]  "

## 2018-03-30 NOTE — MR AVS SNAPSHOT
After Visit Summary   3/30/2018    Supriya Herr    MRN: [id number]           Patient Information     Date Of Birth          1950        Visit Information        Provider Department      3/30/2018 1:00 PM Noam Jackson MD [facility]        Today's Diagnoses     Visit for screening mammogram    -  1    Medicare annual wellness visit, subsequent        CKD (chronic kidney disease) stage 5, GFR less than 15 ml/min (H)        Screening for malignant neoplasm of cervix        NICOLE (generalized anxiety disorder)        Seasonal affective disorder (H)        Intertrigo          Care Instructions      Preventive Health Recommendations  Female Ages 65 +    Yearly exam:     See your health care provider every year in order to  o Review health changes.   o Discuss preventive care.    o Review your medicines if your doctor has prescribed any.      You no longer need a yearly Pap test unless you've had an abnormal Pap test in the past 10 years. If you have vaginal symptoms, such as bleeding or discharge, be sure to talk with your provider about a Pap test.      Every 1 to 2 years, have a mammogram.  If you are over 69, talk with your health care provider about whether or not you want to continue having screening mammograms.      Every 10 years, have a colonoscopy. Or, have a yearly FIT test (stool test). These exams will check for colon cancer.       Have a cholesterol test every 5 years, or more often if your doctor advises it.       Have a diabetes test (fasting glucose) every three years. If you are at risk for diabetes, you should have this test more often.       At age 65, have a bone density scan (DEXA) to check for osteoporosis (brittle bone disease).    Shots:    Get a flu shot each year.    Get a tetanus shot every 10 years.    Talk to your doctor about your pneumonia vaccines. There are now two you should receive - Pneumovax (PPSV 23) and Prevnar (PCV 13).    Talk to  your doctor about the shingles vaccine.    Talk to your doctor about the hepatitis B vaccine.    Nutrition:     Eat at least 5 servings of fruits and vegetables each day.      Eat whole-grain bread, whole-wheat pasta and brown rice instead of white grains and rice.      Talk to your provider about Calcium and Vitamin D.     Lifestyle    Exercise at least 150 minutes a week (30 minutes a day, 5 days a week). This will help you control your weight and prevent disease.      Limit alcohol to one drink per day.      No smoking.       Wear sunscreen to prevent skin cancer.       See your dentist twice a year for an exam and cleaning.      See your eye doctor every 1 to 2 years to screen for conditions such as glaucoma, macular degeneration, cataracts, etc     Preventive Health Recommendations    Female Ages 65 +    Yearly exam:     See your health care provider every year in order to  o Review health changes.   o Discuss preventive care.    o Review your medicines if your doctor has prescribed any.      You no longer need a yearly Pap test unless you've had an abnormal Pap test in the past 10 years. If you have vaginal symptoms, such as bleeding or discharge, be sure to talk with your provider about a Pap test.      Every 1 to 2 years, have a mammogram.  If you are over 69, talk with your health care provider about whether or not you want to continue having screening mammograms.      Every 10 years, have a colonoscopy. Or, have a yearly FIT test (stool test). These exams will check for colon cancer.       Have a cholesterol test every 5 years, or more often if your doctor advises it.       Have a diabetes test (fasting glucose) every three years. If you are at risk for diabetes, you should have this test more often.       At age 65, have a bone density scan (DEXA) to check for osteoporosis (brittle bone disease).    Shots:    Get a flu shot each year.    Get a tetanus shot every 10 years.    Talk to your doctor about your  pneumonia vaccines. There are now two you should receive - Pneumovax (PPSV 23) and Prevnar (PCV 13).    Talk to your doctor about the shingles vaccine.    Talk to your doctor about the hepatitis B vaccine.    Nutrition:     Eat at least 5 servings of fruits and vegetables each day.      Eat whole-grain bread, whole-wheat pasta and brown rice instead of white grains and rice.      Talk to your provider about Calcium and Vitamin D.     Lifestyle    Exercise at least 150 minutes a week (30 minutes a day, 5 days a week). This will help you control your weight and prevent disease.      Limit alcohol to one drink per day.      No smoking.       Wear sunscreen to prevent skin cancer.       See your dentist twice a year for an exam and cleaning.      See your eye doctor every 1 to 2 years to screen for conditions such as glaucoma, macular degeneration and cataracts.          Follow-ups after your visit        Your next 10 appointments already scheduled     Apr 03, 2018 12:30 PM CDT   (Arrive by 12:15 PM)   NEW FOOT/ANKLE with Alvaro Gautam MD   [facility] ([facility])    [address] [place] [address]  4th [facility] [id number] [phone]            Apr 11, 2018  9:45 AM CDT   Lab with  LAB   [facility] ([facility])    [address] [place] [id number] [phone]            Apr 11, 2018 10:45 AM CDT   (Arrive by 10:15 AM)   Return Visit with Kathryn Basilio MD   [facility]ph[facility] ([facility])    [address] [place] [address]  [address]  [facility] [id number] [phone]            Apr 23, 2018  8:30 AM CDT   US AORTIC IMAGING with UCUSV2   [facility] US ([facility])    [address] [place] [id number] [phone]           Please bring a list of your medicines (including vitamins, minerals and over-the-counter  drugs). Also, tell your doctor about any allergies you may have. Wear comfortable clothes and leave your valuables at home.  Adults: No eating or drinking for 8 hours before the exam. You may take medicine with a small sip of water.  Children: - Children 6+ years: No food or drink for 6 hours before exam. - Children 1-5 years: No food or drink for 4 hours before exam. - Infants, breast-fed: may have breast milk up to 2 hours before exam. - Infants, formula: may have bottle until 4 hours before exam.  Please call the Imaging Department at your exam site with any questions.            Apr 23, 2018  9:00 AM CDT   US AORTA/IVC/ILIAC DUPLEX COMPLETE with USV2   [facility] ([facility])    [address] [place] [id number] [phone]           Please bring a list of your medicines (including vitamins, minerals and over-the-counter drugs). Also, tell your doctor about any allergies you may have. Wear comfortable clothes and leave your valuables at home.  Adults: No eating or drinking for 8 hours before the exam. You may take medicine with a small sip of water.  Children: - Children 6+ years: No food or drink for 6 hours before exam. - Children 1-5 years: No food or drink for 4 hours before exam. - Infants, breast-fed: may have breast milk up to 2 hours before exam. - Infants, formula: may have bottle until 4 hours before exam.  Please call the Imaging Department at your exam site with any questions.            Apr 23, 2018 10:30 AM CDT   FULL PULMONARY FUNCTION with  PFL D   [facility] Pulmonary Function Testing ([facility])    [address] [place] [id number] [phone]            Apr 23, 2018  2:30 PM CDT   (Arrive by 2:15 PM)   NEW PANCREAS/KIDNEY TRANSPLANT WORK-UP with Milton Guadarrama MD   [facility] ([facility])    [address] [place] [address]  "[place] [address]   [phone]            2018  2:30 PM CDT   (Arrive by 2:15 PM)   New Patient Visit with ABIMAEL Shepard MD   [facility] ([facility])    [address]  3rd Floor  [facility] [address]   [phone]              Future tests that were ordered for you today     Open Future Orders        Priority Expected Expires Ordered    MA SCREENING DIGITAL BILAT - Future  (s+30) Routine  3/30/2019 3/30/2018            Who to contact     If you have questions or need follow up information about today's clinic visit or your schedule please contact [facility] directly at [phone].  Normal or non-critical lab and imaging results will be communicated to you by [employer], letter or phone within 4 business days after the clinic has received the results. If you do not hear from us within 7 days, please contact the clinic through [employer] or phone. If you have a critical or abnormal lab result, we will notify you by phone as soon as possible.  Submit refill requests through [employer] or call your pharmacy and they will forward the refill request to us. Please allow 3 business days for your refill to be completed.          Additional Information About Your Visit        [employer] Information     [employer] lets you send messages to your doctor, view your test results, renew your prescriptions, schedule appointments and more. To sign up, go to [email or web address] . Click on \"Log in\" on the left side of the screen, which will take you to the Welcome page. Then click on \"Sign up Now\" on the right side of the page.     You will be asked to enter the access code listed below, as well as some personal information. Please follow the directions to create your username and password.     Your access code is: [email or web address]  Expires: 2018  4:04 PM     Your access code will  in 90 days. If you need help or a new code, please call your [facility] or " [phone].        Care EveryWhere ID     This is your Care EveryWhere ID. This could be used by other organizations to access your [facility] medical records  [id number]        Your Vitals Were     Pulse Temperature Pulse Oximetry BMI (Body Mass Index)          65 97.7  F (36.5  C) (Oral) 98% 30.58 kg/m2         Blood Pressure from Last 3 Encounters:   03/30/18 136/48   03/29/18 159/52   03/02/18 137/70    Weight from Last 3 Encounters:   03/30/18 188 lb (85.3 kg)   03/29/18 186 lb (84.4 kg)   03/02/18 191 lb (86.6 kg)              We Performed the Following     HPV High Risk Types DNA Cervical     Pap imaged thin layer screen with HPV - recommended age 30 - 65 years (select HPV order below)          Today's Medication Changes          These changes are accurate as of 3/30/18  1:53 PM.  If you have any questions, ask your nurse or doctor.               Start taking these medicines.        Dose/Directions    ACE/ARB/ARNI NOT PRESCRIBED (INTENTIONAL)   Used for:  CKD (chronic kidney disease) stage 5, GFR less than 15 ml/min (H)   Started by:  Noam Jackson MD        Please choose reason not prescribed, below   Refills:  0       clotrimazole 1 % cream   Commonly known as:  LOTRIMIN   Used for:  Intertrigo   Started by:  Noam Jackson MD        Apply topically 2 times daily   Quantity:  60 g   Refills:  3       hydrocortisone 1 % cream   Commonly known as:  CORTAID   Used for:  Intertrigo   Started by:  Noam Jackson MD        Apply sparingly to affected area two times daily for 14 days.   Quantity:  60 g   Refills:  3       order for DME   Used for:  Seasonal affective disorder (H)   Started by:  Noam Jackson MD        1 SAD light   Quantity:  1 Device   Refills:  1       sertraline 25 MG tablet   Commonly known as:  ZOLOFT   Used for:  NICOLE (generalized anxiety disorder)   Started by:  Noam Jackson MD        Dose:  25 mg   Take 1 tablet (25 mg) by mouth  daily   Quantity:  30 tablet   Refills:  3            Where to get your medicines      These medications were sent to [facility]/pharmacy #[id number] - [place], [place] - [address]  [address] [facility] [address]     Phone:  [phone]     clotrimazole 1 % cream    hydrocortisone 1 % cream    sertraline 25 MG tablet         Some of these will need a paper prescription and others can be bought over the counter.  Ask your nurse if you have questions.     Bring a paper prescription for each of these medications     order for DME       You don't need a prescription for these medications     ACE/ARB/ARNI NOT PRESCRIBED (INTENTIONAL)                Primary Care Provider Office Phone # Fax #    Noam Luis Jackson [phone]       [address] [facility] [address]  [facility] [address]        Equal Access to Services     KALYANI WARREN : Emeterio jorgeo Soaleksandar, waaxda luqadaha, qaybta kaalmada adeegyada, madeline chaudhari. So [facility] [phone].    ATENCIÓN: Si habla español, tiene a santoro disposición servicios gratuitos de asistencia lingüística. [facility] [phone].    We comply with applicable federal civil rights laws and [place] laws. We do not discriminate on the basis of race, color, national origin, age, disability, sex, sexual orientation, or gender identity.            Thank you!     Thank you for choosing [facility]  for your care. Our goal is always to provide you with excellent care. Hearing back from our patients is one way we can continue to improve our services. Please take a few minutes to complete the written survey that you may receive in the mail after your visit with us. Thank you!             Your Updated Medication List - Protect others around you: Learn how to safely use, store and throw away your medicines at www.disposemymeds.org.          This list is accurate as of 3/30/18  1:53 PM.  Always use your most recent med list.                    Brand Name Dispense Instructions for use Diagnosis    ACE/ARB/ARNI NOT PRESCRIBED (INTENTIONAL)      Please choose reason not prescribed, below    CKD (chronic kidney disease) stage 5, GFR less than 15 ml/min (H)       albuterol 108 (90 BASE) MCG/ACT Inhaler    PROAIR HFA/PROVENTIL HFA/VENTOLIN HFA    3 Inhaler    Inhale 2 puffs into the lungs every 6 hours as needed for shortness of breath / dyspnea or wheezing    Cough       ASPIRIN NOT PRESCRIBED    INTENTIONAL    0 each    1 each continuous prn Antiplatelet medication not prescribed intentionally due to current nosebleeds    Anemia due to blood loss, acute       atorvastatin 20 MG tablet    LIPITOR    90 tablet    Take 1 tablet (20 mg) by mouth daily    Hyperlipidemia LDL goal <100       * blood glucose monitoring test strip    ONETOUCH ULTRA    200 strip    Use to test blood sugars 2 times daily or as directed.    Type 2 diabetes mellitus with stage 3 chronic kidney disease, without long-term current use of insulin (H)       * blood glucose monitoring test strip    ONETOUCH ULTRA    200 strip    Test your blood sugar 3-4 times per day.    Type 2 diabetes mellitus with hyperglycemia, with long-term current use of insulin (H)       CALCITRIOL PO      Take by mouth daily Unsure of dosage        carvedilol 12.5 MG tablet    COREG    60 tablet    Take 1 tablet (12.5 mg) by mouth 2 times daily (with meals)    Essential hypertension with goal blood pressure less than 140/90       clotrimazole 1 % cream    LOTRIMIN    60 g    Apply topically 2 times daily    Intertrigo       ferrous sulfate 325 (65 FE) MG tablet    IRON    180 tablet    Take 1 tablet (325 mg) by mouth 2 times daily    Iron deficiency       * furosemide 40 MG tablet    LASIX    30 tablet    Take 1 tablet (40 mg) by mouth daily    Lymphedema of both lower extremities       * furosemide 20 MG tablet    LASIX    30 tablet    Take 1 tablet (20 mg) by mouth At Bedtime    Lymphedema of both lower  extremities, Essential hypertension with goal blood pressure less than 140/90       hydrocortisone 1 % cream    CORTAID    60 g    Apply sparingly to affected area two times daily for 14 days.    Intertrigo       insulin glargine 100 UNIT/ML injection    LANTUS    3 mL    Inject 20 Units Subcutaneous At Bedtime    Type 2 diabetes mellitus with diabetic neuropathy, with long-term current use of insulin (H)       insulin pen needle 31G X 6 MM    ULTICARE MINI    100 each    Use daily or as directed.    Type 2 diabetes, HbA1c goal < 7% (H)       lactobacillus rhamnosus (GG) capsule     60 capsule    Take 1 capsule by mouth 2 times daily    Cellulitis of right leg       miconazole 2 % powder    MICATIN; MICRO GUARD    71 g    Apply topically 2 times daily    Fungal dermatitis       MULTIVITAMIN PO      1 tablet by mouth daily        NovoLOG FLEXPEN 100 UNIT/ML injection   Generic drug:  insulin aspart     15 mL    Inject 4 units SQ with breakfast, lunch and dinner.    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    Edema of lower extremity       order for DME     1  Device    1 SAD light    Seasonal affective disorder (H)       sertraline 25 MG tablet    ZOLOFT    30 tablet    Take 1 tablet (25 mg) by mouth daily    NICOLE (generalized anxiety disorder)       Urea 20 % Crea cream     85 g    Apply topically daily    Xerosis cutis, Tyloma       * Notice:  This list has 7 medication(s) that are the same as other medications prescribed for you. Read the directions carefully, and ask your doctor or other care provider to review them with you.

## 2018-03-31 ENCOUNTER — MEDICAL CORRESPONDENCE (OUTPATIENT)
Dept: HEALTH INFORMATION MANAGEMENT | Facility: CLINIC | Age: 68
End: 2018-03-31

## 2018-04-02 DIAGNOSIS — N18.5 CKD (CHRONIC KIDNEY DISEASE) STAGE 5, GFR LESS THAN 15 ML/MIN (H): Primary | ICD-10-CM

## 2018-04-03 ENCOUNTER — TELEPHONE (OUTPATIENT)
Dept: TRANSPLANT | Facility: CLINIC | Age: 68
End: 2018-04-03

## 2018-04-03 ENCOUNTER — OFFICE VISIT (OUTPATIENT)
Dept: ORTHOPEDICS | Facility: CLINIC | Age: 68
End: 2018-04-03
Attending: PODIATRIST
Payer: MEDICARE

## 2018-04-03 VITALS — WEIGHT: 188 LBS | BODY MASS INDEX: 30.22 KG/M2 | HEIGHT: 66 IN

## 2018-04-03 DIAGNOSIS — M25.571 PAIN IN JOINT, ANKLE AND FOOT, RIGHT: Primary | ICD-10-CM

## 2018-04-03 LAB
A* LOCUS NMDP: NORMAL
A* LOCUS: NORMAL
A* NMDP: NORMAL
A*: NORMAL
ABTEST METHOD: NORMAL
B* LOCUS NMDP: NORMAL
B* LOCUS: NORMAL
B* NMDP: NORMAL
B*: NORMAL
BW-1: NORMAL
BW-2: NORMAL
C* LOCUS NMDP: NORMAL
C* LOCUS: NORMAL
C* NMDP: NORMAL
C*: NORMAL
COPATH REPORT: NORMAL
DPA1* NMDP: NORMAL
DPA1*: NORMAL
DPA1*LOCUS: NORMAL
DPB1* LOCUS NMDP: NORMAL
DPB1* NMDP: NORMAL
DPB1*: NORMAL
DPB1*LOCUS: NORMAL
DQA1*: NORMAL
DQA1*LOCUS NMDP: NORMAL
DQA1*LOCUS: NORMAL
DQB1* LOCUS NMDP: NORMAL
DQB1* LOCUS: NORMAL
DQB1* NMDP: NORMAL
DQB1*: NORMAL
DRB1* LOCUS NMDP: NORMAL
DRB1* LOCUS: NORMAL
DRB1* NMDP: NORMAL
DRB1*: NORMAL
DRB3* LOCUS NMDP: NORMAL
DRB3* LOCUS: NORMAL
DRB5* LOCUS: NORMAL
DRB5* NMDP: NORMAL
DRSSO TEST METHOD: NORMAL
PAP: NORMAL
PROTOCOL CUTOFF: NORMAL
SA1 CELL: NORMAL
SA1 COMMENTS: NORMAL
SA1 HI RISK ABY: NORMAL
SA1 MOD RISK ABY: NORMAL
SA1 TEST METHOD: NORMAL
SA2 CELL: NORMAL
SA2 COMMENTS: NORMAL
SA2 HI RISK ABY UA: NORMAL
SA2 MOD RISK ABY: NORMAL
SA2 TEST METHOD: NORMAL
UNACCEPTABLE ANTIGEN: NORMAL
UNOS CPRA: 58

## 2018-04-03 NOTE — MR AVS SNAPSHOT
After Visit Summary   4/3/2018    Supriya Herr    MRN: [id number]           Patient Information     Date Of Birth          1950        Visit Information        Provider Department      4/3/2018 4:30 PM Alvaro Gautam MD [facility]        Today's Diagnoses     Pain in joint, ankle and foot, right    -  1       Follow-ups after your visit        Your next 10 appointments already scheduled     Apr 11, 2018  9:45 AM CDT   Lab with  LAB   [facility] Lab ([facility])    [address] [place] [address]  [address] [facility] [address]   [phone]            Apr 11, 2018 10:45 AM CDT   (Arrive by 10:15 AM)   Return Visit with Kathryn Basilio MD   [facility] Nephrology ([facility])    [address] [place] [address]  [address]  [facility] [address]   [phone]            Apr 11, 2018  2:30 PM CDT   (Arrive by 2:15 PM)   New Patient Visit with ABIMAEL Shepard MD   [facility] Dermatology ([facility])    [address] [place] [address]  [address] [facility] [address]   [phone]            Apr 23, 2018  8:30 AM CDT   US AORTIC IMAGING with UCUSV2   [facility] [place] US ([facility])    [address] [place] [address] [phone]           Please bring a list of your medicines (including vitamins, minerals and over-the-counter drugs). Also, tell your doctor about any allergies you may have. Wear comfortable clothes and leave your valuables at home.  Adults: No eating or drinking for 8 hours before the exam. You may take medicine with a small sip of water.  Children: - Children 6+ years: No food or drink for 6 hours before exam. - Children 1-5 years: No food or drink for 4 hours before exam. - Infants, breast-fed: may have breast milk up to 2 hours before exam. - Infants, formula: may have bottle until 4 hours before exam.  Please call the Imaging  Department at your exam site with any questions.            Apr 23, 2018  9:00 AM CDT   US AORTA/IVC/ILIAC DUPLEX COMPLETE with UCUSV2   [facility] US ([facility])    [address] [place] [address]  [address] [facility] [id number] [phone]           Please bring a list of your medicines (including vitamins, minerals and over-the-counter drugs). Also, tell your doctor about any allergies you may have. Wear comfortable clothes and leave your valuables at home.  Adults: No eating or drinking for 8 hours before the exam. You may take medicine with a small sip of water.  Children: - Children 6+ years: No food or drink for 6 hours before exam. - Children 1-5 years: No food or drink for 4 hours before exam. - Infants, breast-fed: may have breast milk up to 2 hours before exam. - Infants, formula: may have bottle until 4 hours before exam.  Please call the Imaging Department at your exam site with any questions.            Apr 23, 2018 10:30 AM CDT   FULL PULMONARY FUNCTION with  PFL D   [facility] Pulmonary Function Testing ([facility])    [address] [place] [address]  3[address] [facility] [id number] [phone]            Apr 23, 2018 12:15 PM CDT   (Arrive by 12:00 PM)   MA SCREENING DIGITAL BILATERAL with UCBCMA1   [facility] [place] [facility] ([facility])    [address] [place] [id number] [phone]           Do not use any powder, lotion or deodorant under your arms or on your breast. If you do, we will ask you to remove it before your exam.  Wear comfortable, two-piece clothing.  If you have any allergies, tell your care team.  Bring any previous mammograms from other facilities or have them mailed to the breast center. Three-dimensional (3D) mammograms are available at [place] locations in [facility], [facility], [facility], and [place]. [facility]  "include [facility] in [place]. Benefits of 3D mammograms include: - Improved rate of cancer detection - Decreases your chance of having to go back for more tests, which means fewer: - \"False-positive\" results (This means that there is an abnormal area but it isn't cancer.) - Invasive testing procedures, such as a biopsy or surgery - Can provide clearer images of the breast if you have dense breast tissue. 3D mammography is an optional exam that anyone can have with a 2D mammogram. It doesn't replace or take the place of a 2D mammogram. 2D mammograms remain an effective screening test for all women.  Not all insurance companies cover the cost of a 3D mammogram. Check with your insurance.            Apr 23, 2018  1:00 PM CDT   (Arrive by 12:45 PM)   CT CHEST LOW DOSE NON CONTRAST with UCCT2   [facility] CT ([facility])    [address] [facility]  1st Floor  [facility] [address] [phone]           Please bring any scans or X-rays taken at other hospitals, if similar tests were done. Also bring a list of your medicines, including vitamins, minerals and over-the-counter drugs. It is safest to leave personal items at home.  Be sure to tell your doctor:   If you have any allergies.   If there s any chance you are pregnant.   If you are breastfeeding.  You do not need to do anything special to prepare for this exam.  Please wear loose clothing, such as a sweat suit or jogging clothes. Avoid snaps, zippers and other metal. We may ask you to undress and put on a hospital gown.            Apr 23, 2018  2:30 PM CDT   (Arrive by 2:15 PM)   NEW PANCREAS/KIDNEY TRANSPLANT WORK-UP with Milton Guadarrama MD   [facility] ([facility])    [address]  [address] [place] [address] [phone]            Apr 26, 2018   Procedure with Kurt Maki MD   [facility] [place] Endoscopy ([facility]" "[facility])    [address] [facility] [address]   [phone]           The [facility] is located on the corner of [address] and [address] on the [facility]. It is easily accessible from virtually any point in the [place] area, via [address].              Future tests that were ordered for you today     Open Future Orders        Priority Expected Expires Ordered    HEPATITIS B VACCINE,ADULT,IM Routine  2019            Who to contact     Please call your clinic at [phone] to:    Ask questions about your health    Make or cancel appointments    Discuss your medicines    Learn about your test results    Speak to your doctor            Additional Information About Your Visit        D[employer]ripp[employer] Information     LIANAI is an electronic gateway that provides easy, online access to your medical records. With LIANAI, you can request a clinic appointment, read your test results, renew a prescription or communicate with your care team.     To sign up for LIANAI visit the website at [email or web address]   You will be asked to enter the access code listed below, as well as some personal information. Please follow the directions to create your username and password.     Your access code is: [email or web address]  Expires: 2018  4:04 PM     Your access code will  in 90 days. If you need help or a new code, please contact your [facility] or call [phone] for assistance.        Care EveryWhere ID     This is your Care EveryWhere ID. This could be used by other organizations to access your [facility] medical records  [id number]        Your Vitals Were     Height BMI (Body Mass Index)                1.67 m (5' 5.75\") 30.58 kg/m2           Blood Pressure from Last 3 Encounters:   18 136/48   18 159/52   18 137/70    Weight from Last 3 Encounters:   18 85.3 kg " (188 lb)   03/30/18 85.3 kg (188 lb)   03/29/18 84.4 kg (186 lb)              Today, you had the following     No orders found for display       Primary Care Provider Office Phone # Fax #    Noam Jackson [phone]       [address]  [facility] [address]        Equal Access to Services     [facility]: Hadii aad ku hadasho Soomaali, waaxda luqadaha, qaybta kaalmada adeegyada, waxay idiin hayaan adeeg kharash la'aan . So [facility] [phone].    ATENCIÓN: Si habla espebrnardo, tiene a santoro disposición servicios gratuitos de asistencia lingüística. Renuka al [phone].    We comply with applicable federal civil rights laws and [place] laws. We do not discriminate on the basis of race, color, national origin, age, disability, sex, sexual orientation, or gender identity.            Thank you!     Thank you for choosing [facility]  for your care. Our goal is always to provide you with excellent care. Hearing back from our patients is one way we can continue to improve our services. Please take a few minutes to complete the written survey that you may receive in the mail after your visit with us. Thank you!             Your Updated Medication List - Protect others around you: Learn how to safely use, store and throw away your medicines at www.disposemymeds.org.          This list is accurate as of 4/3/18 11:59 PM.  Always use your most recent med list.                   Brand Name Dispense Instructions for use Diagnosis    ACE/ARB/ARNI NOT PRESCRIBED (INTENTIONAL)      Please choose reason not prescribed, below    CKD (chronic kidney disease) stage 5, GFR less than 15 ml/min (H)       albuterol 108 (90 BASE) MCG/ACT Inhaler    PROAIR HFA/PROVENTIL HFA/VENTOLIN HFA    3 Inhaler    Inhale 2 puffs into the lungs every 6 hours as needed for shortness of breath / dyspnea or wheezing    Cough       ASPIRIN NOT PRESCRIBED    INTENTIONAL    0 each    1 each continuous prn  Antiplatelet medication not prescribed intentionally due to current nosebleeds    Anemia due to blood loss, acute       atorvastatin 20 MG tablet    LIPITOR    90 tablet    Take 1 tablet (20 mg) by mouth daily    Hyperlipidemia LDL goal <100       * blood glucose monitoring test strip    ONETOUCH ULTRA    200 strip    Use to test blood sugars 2 times daily or as directed.    Type 2 diabetes mellitus with stage 3 chronic kidney disease, without long-term current use of insulin (H)       * blood glucose monitoring test strip    ONETOUCH ULTRA    200 strip    Test your blood sugar 3-4 times per day.    Type 2 diabetes mellitus with hyperglycemia, with long-term current use of insulin (H)       CALCITRIOL PO      Take by mouth daily Unsure of dosage        carvedilol 12.5 MG tablet    COREG    60 tablet    Take 1 tablet (12.5 mg) by mouth 2 times daily (with meals)    Essential hypertension with goal blood pressure less than 140/90       clotrimazole 1 % cream    LOTRIMIN    60 g    Apply topically 2 times daily    Intertrigo       ferrous sulfate 325 (65 FE) MG tablet    IRON    180 tablet    Take 1 tablet (325 mg) by mouth 2 times daily    Iron deficiency       * furosemide 40 MG tablet    LASIX    30 tablet    Take 1 tablet (40 mg) by mouth daily    Lymphedema of both lower extremities       * furosemide 20 MG tablet    LASIX    30 tablet    Take 1 tablet (20 mg) by mouth At Bedtime    Lymphedema of both lower extremities, Essential hypertension with goal blood pressure less than 140/90       hydrocortisone 1 % cream    CORTAID    60 g    Apply sparingly to affected area two times daily for 14 days.    Intertrigo       insulin glargine 100 UNIT/ML injection    LANTUS    3 mL    Inject 20 Units Subcutaneous At Bedtime    Type 2 diabetes mellitus with diabetic neuropathy, with long-term current use of insulin (H)       insulin pen needle 31G X 6 MM    ULTICARE MINI    100 each    Use daily or as directed.    Type 2  diabetes, HbA1c goal < 7% (H)       lactobacillus rhamnosus (GG) capsule     60 capsule    Take 1 capsule by mouth 2 times daily    Cellulitis of right leg       miconazole 2 % powder    MICATIN; MICRO GUARD    71 g    Apply topically 2 times daily    Fungal dermatitis       MULTIVITAMIN PO      1 tablet by mouth daily        NovoLOG FLEXPEN 100 UNIT/ML injection   Generic drug:  insulin aspart     15 mL    Inject 4 units SQ with breakfast, lunch and dinner.    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    Edema of lower extremity       order for DME     1 Device    1 SAD light    Seasonal affective disorder (H)       sertraline 25 MG tablet    ZOLOFT    30 tablet    Take 1 tablet (25 mg) by mouth daily    NICOLE (generalized anxiety disorder)       Urea 20 % Crea cream     85 g    Apply topically daily    Xerosis cutis, Tyloma       * Notice:  This list has 7 medication(s) that are the same as other medications prescribed for you. Read the directions carefully, and ask your doctor or other care provider to review them with you.

## 2018-04-03 NOTE — NURSING NOTE
Reason For Visit:   Chief Complaint   Patient presents with     Consult     Right 1st MTP HW loosening. Referred by Dr. Pack           Pain Assessment  Patient Currently in Pain: Yes  0-10 Pain Scale: 4  Primary Pain Location: Foot  Pain Orientation: Right  Pain Descriptors: Dull, Stabbing  Alleviating Factors: Rest  Aggravating Factors:  (cold temperatures)

## 2018-04-03 NOTE — LETTER
4/3/2018       RE: Supriya Herr  [address]  [facility] [address]     Dear Colleague,    Thank you for referring your patient, Supriya Herr, to the [facility] at [facility]. Please see a copy of my visit note below.    CHIEF COMPLAINT:  Status post right first MTP joint arthrodesis with painful retained hardware.      HISTORY OF PRESENT ILLNESS:  Ms. Herr presents today for evaluation in the company of her daughter.  The patient reports to have undergone a first MTP joint arthrodesis many years back.  Reports now to have some pain and discomfort from the presence of the hardware which is located along the most distal medial portion of the great toe.  She also reports to be awaiting an evaluation for a kidney transplant as well as for dialysis.  Overall, she reports that her health seems to be pointing in the right direction.      PHYSICAL EXAMINATION:  On today's visit, she presents with a fused first MTP joint with a slight valgus alignment.  The skin is very thin and fragile and clearly presents with a palpable screw head along the medial portion of the former proximal phalanx of the great toe.      Otherwise, presents with some fixed contractures across her foot without any obvious signs of skin overload or pressure.  Range of motion of the ankle is within normal limits.      RADIOGRAPHIC EVALUATION:  Plain x-rays of the foot were obtained today which were significant for showing a well-placed small fragment screw across the former first MTP joint which has not been displaced or backed out.      ASSESSMENT:  Right great toe painful retained hardware.      PLAN:  I discussed with the patient and her daughter that at this point the options are to proceed with hardware removal versus observation.  Given the fact that she presented with a fair amount of activity with regards to the rest of her health, my recommendation is to postpone any type  of surgery just in case we get into an infection or some sort of complication.  I also discussed with them that we can always pursue hardware removal at their convenience without any major difficulties.      All questions were answered.  The patient was pleased with the discussion.  For the time being, they will proceed accordingly and they will follow up on a p.r.n. basis.  All questions were answered.      TT 15 minutes, CT 10 minutes.       Sincerely,    Alvaro Gautam MD    CC: Patient

## 2018-04-03 NOTE — TELEPHONE ENCOUNTER
Received message from Aileen Lorenzana that she would like Supriya to be seen by derm sooner than the scheduled appointment the end of June. I was able to secure an appointment tomorrow with derm after talking with their triage staff. Scheduling spoke with daughter and she did not want that appointment due to work conflict. I called Caroline this morning and left her a voice mail asking her to call me so we can work out an appointment that will be acceptable to her and Supriya.

## 2018-04-03 NOTE — PROGRESS NOTES
CHIEF COMPLAINT:  Status post right first MTP joint arthrodesis with painful retained hardware.      HISTORY OF PRESENT ILLNESS:  Ms. Herr presents today for evaluation in the company of her daughter.  The patient reports to have undergone a first MTP joint arthrodesis many years back.  Reports now to have some pain and discomfort from the presence of the hardware which is located along the most distal medial portion of the great toe.  She also reports to be awaiting an evaluation for a kidney transplant as well as for dialysis.  Overall, she reports that her health seems to be pointing in the right direction.      PHYSICAL EXAMINATION:  On today's visit, she presents with a fused first MTP joint with a slight valgus alignment.  The skin is very thin and fragile and clearly presents with a palpable screw head along the medial portion of the former proximal phalanx of the great toe.      Otherwise, presents with some fixed contractures across her foot without any obvious signs of skin overload or pressure.  Range of motion of the ankle is within normal limits.      RADIOGRAPHIC EVALUATION:  Plain x-rays of the foot were obtained today which were significant for showing a well-placed small fragment screw across the former first MTP joint which has not been displaced or backed out.      ASSESSMENT:  Right great toe painful retained hardware.      PLAN:  I discussed with the patient and her daughter that at this point the options are to proceed with hardware removal versus observation.  Given the fact that she presented with a fair amount of activity with regards to the rest of her health, my recommendation is to postpone any type of surgery just in case we get into an infection or some sort of complication.  I also discussed with them that we can always pursue hardware removal at their convenience without any major difficulties.      All questions were answered.  The patient was pleased with the discussion.  For the  time being, they will proceed accordingly and they will follow up on a p.r.n. basis.  All questions were answered.      TT 15 minutes, CT 10 minutes.

## 2018-04-04 ENCOUNTER — COMMITTEE REVIEW (OUTPATIENT)
Dept: TRANSPLANT | Facility: CLINIC | Age: 68
End: 2018-04-04

## 2018-04-04 ENCOUNTER — DOCUMENTATION ONLY (OUTPATIENT)
Dept: TRANSPLANT | Facility: CLINIC | Age: 68
End: 2018-04-04

## 2018-04-04 ENCOUNTER — TELEPHONE (OUTPATIENT)
Dept: TRANSPLANT | Facility: CLINIC | Age: 68
End: 2018-04-04

## 2018-04-04 DIAGNOSIS — Z76.82 AWAITING ORGAN TRANSPLANT: Primary | ICD-10-CM

## 2018-04-04 NOTE — TELEPHONE ENCOUNTER
Spoke to Supriya and Caroline separately today about the Selection Committee decision to approve Supriya as a kidney transplant candidate. She has been added to the  donor kidney wait list as INACTIVE pending the successful completion of her evaluation. She does have a qualifying GFR of 13 on 3/29/2018. She is scheduled to have an aortic ultrasound, iliac dopplers, PFT's, mammogram and cardiology consult on 2018. We are also asking for a low dose chest CT for surveillance and she will be called by scheduling with that appointment. Caroline is planning on talking with Supriya's PCP about some home health assistance and monitoring of her coccyx. There is no breakdown but there is pressure from sitting in her wheelchair. I did stress to both of them if she does have skin breakdown she could not have a transplant until it was healed. Caroline will look into getting some offsetting items for her chair. She will also talk with the PCP for a referral for a mental health therapist. Supriya was recently prescribed zoloft and a SAD light. Caroline felt having a therapist to talk to would be good for her mother. Caroline will also talk to the PCP to schedule a colonoscopy and start the Hepatitis B series. Supriya needs to see dermatology for her skin cancers and I will work on getting an appointment sooner than the end of  as scheduled. They both understand she is now accruing waiting time but not yet eligible to receive organ offers until all of the above is successfully completed. Caroline did ask if she should start the evaluation process to become Supriya's donor. She was given the donor phone number.Transplant summary letter and listing letter will be sent.

## 2018-04-04 NOTE — COMMITTEE REVIEW
Abdominal Committee Review Note     Evaluation Date: 3/29/2018  Committee Review Date: 4/4/2018    Organ being evaluated for: Kidney    Transplant Phase: Evaluation  Transplant Status: Active    Transplant Coordinator: Mini Farmer  Transplant Surgeon:       Referring Physician: Angelita Martin    Primary Diagnosis: Diabetes Mellitus - Type II  Secondary Diagnosis:     Committee Review Members:  Nephrology Hugo Lopez MD, Aly Harrington, APRN CNP, Viral Adryan Xavier MD   Nutrition Magalie Dillard, LISANDRO   Pharmacist Kimberly Gamble [facility]    - Clinical Alicia Canada, [facility], Dipika Houston, [facility]   Transplant Aileen Lorenzana PA-C, Deena Tran, RN, Priya Cordero, RN, Caroline Shelton RN, Mini Farmer, SHANTA, Tru Cage MD       Transplant Eligibility: Irreversible chronic kidney disease treated w/dialysis or expected need for dialysis    Committee Review Decision: Approved    Relative Contraindications: None    Absolute Contraindications: None    Committee Chair Tru Cage MD verbally attested to the committee's decision.    Committee Discussion Details: Reviewed medical records and evaluation results to date. Committee has approved patient as a kidney transplant candidate. She is not on dialysis and will be listed as INACTIVE pending the successful completion of her evaluation. She has a qualifying GFR of 13 at evaluation. She will need to complete cardiology,PFT's, aorta ultrasound and iliac dopplers. She needs to see Dermatology for her skin cancer. History of smoking so she needs a low dose chest CT for surveillance.Social work is recommending establishing care with a therapist but her PCP recently started her on Zoloft and a SAD light. Will discuss with Supriya. She needs to complete her health maintenance.Patient will be called, transplant summary letter and inactive listing letter will be sent.

## 2018-04-04 NOTE — LETTER
2018    Supriya Herr  [address]  [facility] [address]   1950    Dear Supriya,    It was a pleasure to see you here recently for consideration of a kidney transplant. Your pre-transplant evaluation began on 2018. Your evaluation results were discussed at our Multidisciplinary Selection Committee on 2018. The Committee has approved you as a candidate for kidney transplant pending the successful completion of the following items:    1. You have an appointment here on 2018 at 2:30 pm with Dr Kd Shepard of Dermatology for a full skin assessment.  2. You have an appointment here on 2018 at 8:30 am for an ultrasound of the aorta.   3. You have an appointment here on 2018 at 9:00 am for aorta/iliac doppler studies.  4. You have an appointment here on 2018 at 10:30 am for PFT's (pulmonary function tests).  5. You have an appointment here on 2018 at 12:15 pm for a mammogram.  6. You have an appointment here on 2018 at 1:00 pm for a low dose chest CT for surveillance with a smoking history.  7. You have an appointment here on 2018 at 2:30 pm with Dr Milton Guadarrama of Cardiology.  8. Please talk with your PCP (primary care provider) to schedule a colonoscopy. Please call me when complete so I can request those records.  9. Please talk with your PCP about a referral to a therapist to help you with your anxiety and depression.  10. Please talk with your PCP about starting the Hepatitis B series vaccinations. Your blood work shows you do not have any immunity to Hepatitis B. Please let me know when your receive your first shot.  11. Please talk with your PCP about ways and tools to use to keep your coccyx ( tailbone) from breaking down. Remember, you can not go to transplant with any open wounds or infections. So, it is important to keep that area from breaking down.     You have already been added onto the kidney transplant wait list on  4/4/2018. You have been placed on INACTIVE status due to needing to successfully complete all of the above. You are now accruing waiting time. You will be notified by our office as to when your status can be changed to ACTIVE.  A separate letter regarding your listing has been mailed.     Please have any potential living donors register now online with our Program to initiate their evaluations at [email or web address] or call the donor line at [phone]. You will be notified by our office in the event of an approved living donor.    It has been a pleasure working with you. If you have any questions please feel free to call me at [phone].    Sincerely,       Britt Farmer RN, BSN Transplant Coordinator  [facility] [address]  [address] [place] [address]  Phone [phone]  Fax [phone]  [email or web address]    CC: Kathryn Akhtar

## 2018-04-04 NOTE — TELEPHONE ENCOUNTER
Left message on Caroline' voice mail that I was able to get a derm appointment for 4/11/2018 at 1430. I asked her to call me to confirm.

## 2018-04-04 NOTE — LETTER
2018    Supriya Herr  [address]  [facility] [address]   1950    Dear Supriya,    This letter is sent to confirm that you have nearly completed your transplant work-up and you are a candidate in the kidney transplant program at the [facility].  You were placed on the kidney inactive waitlist on 2018.  This means you will accumulate waiting time but not receive  donor calls.       Items we will need from you:      We have received approval from you insurance company for the transplant procedure.  It is critical that you notify us if there is any change in your insurance.  It is also important that you familiarize yourself with the details of your specific insurance policy.  Our patient  is available to assist you if you should have any questions regarding your coverage.      An ALA or PRA blood sample will  need to be sent here every 3 months to match you with  donors or any potential living donors.  If you need this testing, special mailing boxes (called mailers) will be sent to you directly from the Outreach Department.  You should take the physician order form and the  to your home laboratory when it is time to for this testing to be done.  Additional mailers can be obtained by calling the Transplant Office and asking to speak to a kidney . I will let you know when to start having these labs drawn.       During this waiting period, we may request additional periodic laboratory tests with your primary physician.  It will be your responsibility to remind your physician to forward your results to the Transplant Office.                  We need to be kept informed of any changes in your medical condition such as:    o changes in your medications,   o significant changes in your health  o significant infections (such as pneumonia or abscesses)  o blood transfusions  o If you start  dialysis  o any condition which requires hospitalization  o any surgery      Remember to complete any routine cancer screening tests required before your transplant.  This includes colonoscopy; prostrate screening for men, and mammogram and gynecologic testing for women, as well as dental work.  Your primary care clinic can assist you with arranging for these exams.  Remind your caregivers to forward copies of the records and final reports.    We want you to know that our program has physician and surgeon coverage 24 hours a day, 365 days a year. If this coverage changes or there are substantial program changes, you will be notified in writing by letter.     Attached is a letter from the [employer] ([employer]). It describes the services and information offered to patients by [employer] and the Organ Procurement and Transplantation Network.    We appreciate having had the opportunity to participate in your care.  If you have questions, please feel free to call the Transplant Office at [phone] or [phone].      Sincerely,       Kidney Transplant Program    Enclosures: Telephone Contact List, Travel Resources, [employer] Letter, Waitlist Information Update and While You Are Waiting  CC:  Kathryn Akhtar

## 2018-04-04 NOTE — PROGRESS NOTES
Patient was added to the  donor kidney wait list as INACTIVE today 2018. She does have a qualifying GFR of 13 on 3/29/2018. Listing letter will be sent.

## 2018-04-05 LAB
FINAL DIAGNOSIS: NORMAL
HPV HR 12 DNA CVX QL NAA+PROBE: NEGATIVE
HPV16 DNA SPEC QL NAA+PROBE: NEGATIVE
HPV18 DNA SPEC QL NAA+PROBE: NEGATIVE
SPECIMEN DESCRIPTION: NORMAL
SPECIMEN SOURCE CVX/VAG CYTO: NORMAL

## 2018-04-06 DIAGNOSIS — I10 ESSENTIAL HYPERTENSION WITH GOAL BLOOD PRESSURE LESS THAN 140/90: ICD-10-CM

## 2018-04-06 DIAGNOSIS — I89.0 LYMPHEDEMA OF BOTH LOWER EXTREMITIES: ICD-10-CM

## 2018-04-06 RX ORDER — CARVEDILOL 25 MG/1
TABLET ORAL
Qty: 180 TABLET | Refills: 2 | OUTPATIENT
Start: 2018-04-06

## 2018-04-06 RX ORDER — FUROSEMIDE 80 MG
TABLET ORAL
Qty: 180 TABLET | Refills: 3 | OUTPATIENT
Start: 2018-04-06

## 2018-04-06 NOTE — TELEPHONE ENCOUNTER
"Requested Prescriptions   Pending Prescriptions Disp Refills     carvedilol (COREG) 25 MG tablet [Pharmacy Med Name: CARVEDILOL 25 MG TABLET] 180 tablet 2     Sig: TAKE 1 TABLET (25 MG) BY MOUTH 2 TIMES DAILY (WITH MEALS)    Beta-Blockers Protocol Passed    4/6/2018  7:48 AM       Passed - Blood pressure under 140/90 in past 12 months    BP Readings from Last 3 Encounters:   03/30/18 136/48   03/29/18 159/52   03/02/18 137/70                Passed - Patient is age 6 or older       Passed - Recent (12 mo) or future (30 days) visit within the authorizing provider's specialty    Patient had office visit in the last 12 months or has a visit in the next 30 days with authorizing provider or within the authorizing provider's specialty.  See \"Patient Info\" tab in inbasket, or \"Choose Columns\" in Meds & Orders section of the refill encounter.          Last Written Prescription Date:  2/21/18  Last Fill Quantity: 60,  # refills: 11 ordered by Dr. Basilio  For 12.5 mg tab BID  Last office visit: 3/30/2018 with prescribing provider:   Future Office Visit:    "

## 2018-04-06 NOTE — TELEPHONE ENCOUNTER
Refill for furosemide 80 mg denied since patient is no longer on this dose. Called patient's daughter and confirmed they do not need furosemide refilled at this time as she has the 20 mg and 40 mg tablets at home. Left message for the pharmacy notifying them of the refill denial.    Pravin Cordova RN

## 2018-04-06 NOTE — TELEPHONE ENCOUNTER
Per documentation, Dr. Basilio had ordered carvedilol 12.5mg bid on 2/21/18.     Refill for 25mg denied    Viridiana Sprague RN   [facility]

## 2018-04-09 ENCOUNTER — TELEPHONE (OUTPATIENT)
Dept: FAMILY MEDICINE | Facility: CLINIC | Age: 68
End: 2018-04-09

## 2018-04-09 ENCOUNTER — HOSPITAL ENCOUNTER (OUTPATIENT)
Facility: CLINIC | Age: 68
End: 2018-04-09
Attending: INTERNAL MEDICINE | Admitting: INTERNAL MEDICINE
Payer: MEDICAID

## 2018-04-09 ENCOUNTER — TELEPHONE (OUTPATIENT)
Dept: ENDOCRINOLOGY | Facility: CLINIC | Age: 68
End: 2018-04-09

## 2018-04-09 ENCOUNTER — TELEPHONE (OUTPATIENT)
Dept: GASTROENTEROLOGY | Facility: CLINIC | Age: 68
End: 2018-04-09

## 2018-04-09 DIAGNOSIS — Z23 NEED FOR HEPATITIS B VACCINATION: Primary | ICD-10-CM

## 2018-04-09 DIAGNOSIS — Z12.11 SPECIAL SCREENING FOR MALIGNANT NEOPLASMS, COLON: ICD-10-CM

## 2018-04-09 PROBLEM — Z12.4 CERVICAL CANCER SCREENING: Status: ACTIVE | Noted: 2018-04-09

## 2018-04-09 NOTE — TELEPHONE ENCOUNTER
Please call patient  And her daughter  1) I did not see the Adult Day Care TB screening form   she will need to come back in for a blood ( gold ) test  2) I did not realize that she needed a whole discussion about her POLST desires i.e. her thoughts on resuscitation, future hospitalizations etc  We were already way over her scheduled time in trying to get her physical done as required by her transplant team so they may need to come back in to have a through POLST discussion

## 2018-04-09 NOTE — TELEPHONE ENCOUNTER
Dr. Jackson    Referral for colonoscopy is cued    Viridiana Sprague, RN   [facility]

## 2018-04-09 NOTE — TELEPHONE ENCOUNTER
LVM with daughter, Caroline Gray to call clinic.    Spoke with patient who states that daughter schedules her appointments for her. Needs lab appt for TB gold test and appointment to discuss POLST.    Ana Luisa Garcia RN  [facility]

## 2018-04-09 NOTE — TELEPHONE ENCOUNTER
Had negative gold test will fax results to adult  day care  Needs hep B vaccine series in the future

## 2018-04-09 NOTE — TELEPHONE ENCOUNTER
Left VM for daughter to return call to clinic    See message from Dr. Jackson    1. Needs Hep b vaccine series  2. Referral for colonoscopy is in.  Your provider has referred you to: [facility]: Colon and Rectal Surgery Clinic [facility] [phone]   [email or web address]   Referral Reason(s): Colonoscopy  Special Concerns: on transplant list  This referral is: Elective (week +)  It is OK to leave a message on patient's voicemail.   Please be aware that coverage of these services is subject to the terms and limitations of your health insurance plan.  Call member services at your health plan with any benefit or coverage questions.      3. POLST form is on triage nurse desk area, fill out with pt/daughter over the phone if they are OK to do that, then Dr. Jackson to sign.    Radha has the other forms that need to be faxed to pt     Viridiana Sprague RN   [facility]

## 2018-04-09 NOTE — TELEPHONE ENCOUNTER
Spoke with daughter  They will look at the POLST form together, it was to difficult for them to discuss over phone as the decisions were to important.  They will fill out the form then get it to us for MD/provider to sign and then fax copy to the day care, they would also need a copy for their records and one for her chart     Daughter did look at the buttock and area is pink, no open areas, no dark purple areas. She does have a piece of foam she sits on but it is not memory foam, may want to consider a ROHO cushion     Advised repositioning when in chair every 1.5 to 2 hours. If any issues arise as we had discussed they should call clinic    They will set up a vaccine appointment for the Hep b    GI referral info given to pt/daughter    Viridiana Sprague RN   [facility]

## 2018-04-09 NOTE — TELEPHONE ENCOUNTER
Dr. Gillis    hep b series needed? See lab results 3/29/18    Need to fax results from lab test on TB test dated 3/29/18  [facility]  Fax     Daughter will fill out the POLST with her mom and get their part done, then will send to provider for his signature    Needs colonoscopy referral    counselor referral to deal with transplant     Daughter will look at her buttock to see if there is a current issue, pt is prone to issues, she will let us know what it is looking like, pt may need to have home care involved for pressure relief as she sits on just the seat of the wheelchair    Referral cued for GI    Advise    Viridiana Sprague RN   [facility]

## 2018-04-11 ENCOUNTER — OFFICE VISIT (OUTPATIENT)
Dept: DERMATOLOGY | Facility: CLINIC | Age: 68
End: 2018-04-11
Payer: MEDICARE

## 2018-04-11 ENCOUNTER — OFFICE VISIT (OUTPATIENT)
Dept: NEPHROLOGY | Facility: CLINIC | Age: 68
End: 2018-04-11
Attending: INTERNAL MEDICINE
Payer: MEDICARE

## 2018-04-11 VITALS
HEART RATE: 58 BPM | WEIGHT: 189 LBS | BODY MASS INDEX: 30.37 KG/M2 | OXYGEN SATURATION: 99 % | HEIGHT: 66 IN | TEMPERATURE: 98.3 F

## 2018-04-11 DIAGNOSIS — I50.20 SYSTOLIC CONGESTIVE HEART FAILURE, UNSPECIFIED CONGESTIVE HEART FAILURE CHRONICITY: ICD-10-CM

## 2018-04-11 DIAGNOSIS — L30.4 INTERTRIGO: ICD-10-CM

## 2018-04-11 DIAGNOSIS — E11.22 TYPE 2 DIABETES MELLITUS WITH DIABETIC CHRONIC KIDNEY DISEASE, UNSPECIFIED CKD STAGE, UNSPECIFIED LONG TERM INSULIN USE STATUS: ICD-10-CM

## 2018-04-11 DIAGNOSIS — N18.5 ANEMIA IN STAGE 5 CHRONIC KIDNEY DISEASE, NOT ON CHRONIC DIALYSIS (H): ICD-10-CM

## 2018-04-11 DIAGNOSIS — L30.9 DERMATITIS: Primary | ICD-10-CM

## 2018-04-11 DIAGNOSIS — L84 CALLUS OF FOOT: ICD-10-CM

## 2018-04-11 DIAGNOSIS — D63.1 ANEMIA IN STAGE 5 CHRONIC KIDNEY DISEASE, NOT ON CHRONIC DIALYSIS (H): ICD-10-CM

## 2018-04-11 DIAGNOSIS — Z85.828 HISTORY OF NONMELANOMA SKIN CANCER: ICD-10-CM

## 2018-04-11 DIAGNOSIS — D50.8 OTHER IRON DEFICIENCY ANEMIA: ICD-10-CM

## 2018-04-11 DIAGNOSIS — E87.5 HYPERKALEMIA: ICD-10-CM

## 2018-04-11 DIAGNOSIS — Z11.1 SCREENING EXAMINATION FOR PULMONARY TUBERCULOSIS: ICD-10-CM

## 2018-04-11 DIAGNOSIS — I10 HYPERTENSION GOAL BP (BLOOD PRESSURE) < 140/90: ICD-10-CM

## 2018-04-11 DIAGNOSIS — N18.5 CKD (CHRONIC KIDNEY DISEASE) STAGE 5, GFR LESS THAN 15 ML/MIN (H): ICD-10-CM

## 2018-04-11 DIAGNOSIS — N18.5 CKD (CHRONIC KIDNEY DISEASE) STAGE 5, GFR LESS THAN 15 ML/MIN (H): Primary | ICD-10-CM

## 2018-04-11 DIAGNOSIS — R80.9 PROTEINURIA, UNSPECIFIED TYPE: ICD-10-CM

## 2018-04-11 LAB
ALBUMIN SERPL-MCNC: 2.7 G/DL (ref 3.4–5)
ANION GAP SERPL CALCULATED.3IONS-SCNC: 7 MMOL/L (ref 3–14)
BUN SERPL-MCNC: 85 MG/DL (ref 7–30)
CALCIUM SERPL-MCNC: 8.9 MG/DL (ref 8.5–10.1)
CHLORIDE SERPL-SCNC: 111 MMOL/L (ref 94–109)
CO2 SERPL-SCNC: 25 MMOL/L (ref 20–32)
CREAT SERPL-MCNC: 3.72 MG/DL (ref 0.52–1.04)
CREAT UR-MCNC: 73 MG/DL
GFR SERPL CREATININE-BSD FRML MDRD: 12 ML/MIN/1.7M2
GLUCOSE SERPL-MCNC: 122 MG/DL (ref 70–99)
HGB BLD-MCNC: 9.4 G/DL (ref 11.7–15.7)
PHOSPHATE SERPL-MCNC: 5.7 MG/DL (ref 2.5–4.5)
POTASSIUM SERPL-SCNC: 4.2 MMOL/L (ref 3.4–5.3)
PROT UR-MCNC: 3.62 G/L
PROT/CREAT 24H UR: 4.98 G/G CR (ref 0–0.2)
SODIUM SERPL-SCNC: 143 MMOL/L (ref 133–144)

## 2018-04-11 PROCEDURE — 85018 HEMOGLOBIN: CPT | Performed by: INTERNAL MEDICINE

## 2018-04-11 PROCEDURE — 80069 RENAL FUNCTION PANEL: CPT | Performed by: INTERNAL MEDICINE

## 2018-04-11 PROCEDURE — 84156 ASSAY OF PROTEIN URINE: CPT | Performed by: INTERNAL MEDICINE

## 2018-04-11 PROCEDURE — 36415 COLL VENOUS BLD VENIPUNCTURE: CPT | Performed by: INTERNAL MEDICINE

## 2018-04-11 PROCEDURE — G0463 HOSPITAL OUTPT CLINIC VISIT: HCPCS | Mod: ZF

## 2018-04-11 PROCEDURE — 86480 TB TEST CELL IMMUN MEASURE: CPT | Performed by: FAMILY MEDICINE

## 2018-04-11 RX ORDER — TRIAMCINOLONE ACETONIDE 0.25 MG/G
OINTMENT TOPICAL
Qty: 80 G | Refills: 1 | Status: SHIPPED | OUTPATIENT
Start: 2018-04-11 | End: 2018-07-03

## 2018-04-11 ASSESSMENT — PAIN SCALES - GENERAL
PAINLEVEL: EXTREME PAIN (8)
PAINLEVEL: NO PAIN (0)

## 2018-04-11 NOTE — LETTER
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 signed out by:  JAMES Fields (ASCP)    Processed and screened at [facility]    CLINICAL HISTO RY:    Post Menopausal, Previous normal pap  Date of Last Pap: 8/27/12,    Papanicolaou Test Limitations:  Cervical cytology is a screening test with   limited sensitivity; regular  screening is critical for cancer prevention; Pap tests are primarily   effective for the diagnosis/prevention of  squamous cell carcinoma, not adenocarcinomas or other cancers.    TESTING LAB LOCATION:  [address]  [phone]    COLLECTION SITE:  Client:  [facility]  Location: [facility] (B)     HPV High Risk Types DNA Cervical    Collection Time: 03/30/18  3:06 PM   Result  Value Ref Range    HPV Source SurePath     HPV 16 DNA Negative NEG^Negative    HPV 18 DNA Negative NEG^Negative    Other HR HPV Negative NEG^Negative    Final Diagnosis This patient's sample is negative for HPV DNA.     Specimen Description Cervical Cells    UNOS Unacceptable Antigens    Collection Time: 04/03/18 12:00 AM   Result Value Ref Range    Protocol Cutoff      Unacceptable Antigen B:46 48 50 DQ:7 8 9    UNOS cPRA    Collection Time: 04/03/18 12:00 AM   Result Value Ref Range    UNOS cPRA 58    Hemoglobin    Collection Time: 04/11/18  9:59 AM   Result Value Ref Range    Hemoglobin 9.4 (L) 11.7 - 15.7 g/dL     CKD Review Creatinine (Serum) GFR, Estimated   Latest Ref Rng 0.52 - 1.04 mg/dL >60 mL/min/1.7m2   5/27/2004 0.60 >80   9/22/2004 12/27/2004 0.60 >80   5/31/2005 0.60 >80   6/13/2005 0.70 >80   8/10/2005     8/12/2005     11/10/2005 0.60 >80   2/8/2006     7/6/2006     10/11/2006     10/25/2006 0.94 65   11/6/2006     4/3/2007 0.56 (L) >90   4/18/2007 4/21/2007 0.67 >90   5/16/2007 5/23/2007 6/27/2007 0.84 74   7/6/2007 11/12/2007 0.72 88   2/27/2008 0.76 83   5/28/2008 6/26/2008 0.63 >90   7/9/2008 11/17/2008 0.88 66   11/20/2008     1/5/2009     3/4/2009 0.59 >90   3/16/2009     7/23/2009 0.73 81   7/30/2009 8/14/2009 12/30/2009 0.64 >90   1/5/2010 5/12/2010 0.68 88   5/17/2010 8/4/2010 0.69 87   10/25/2010     10/29/2010     12/27/2010     12/29/2010     1/27/2011     4/11/2011 0.60 >90   7/13/2011 12/8/2011 12/8/2011 12/8/2011 0.65 >90   12/21/2011 0.73 81   2/9/2012 0.69 86   8/27/2012 0.97 58 (L)   12/4/2012     12/5/2012     12/13/2012     12/13/2012     12/17/2012     3/8/2013     3/8/2013     3/20/2013     4/9/2013     4/11/2013     5/3/2013     5/3/2013 0.90 63   5/3/2013     5/6/2013     5/6/2013     5/6/2013     5/6/2013     5/14/2013     6/20/2013     7/18/2013     7/18/2013     7/25/2013     8/8/2013     11/7/2013 1.17 (H) 47 (L)    11/18/2013 11/21/2013 1.07 (H) 52 (L)   12/11/2013 12/30/2013 12/30/2013 12/30/2013 1.09 (H) 51 (L)   12/30/2013 12/30/2013 12/30/2013 12/30/2013 12/30/2013 12/30/2013 12/31/2013 12/31/2013 1.12 (H) 49 (L)   12/31/2013 12/31/2013 12/31/2013 12/31/2013 1/1/2014 1/1/2014 1/1/2014 1.14 (H) 48 (L)   1/1/2014 1/1/2014 1/1/2014 1/1/2014 1/1/2014 1/2/2014 1/2/2014 1/2/2014 1/6/2014 1/7/2014 1.22    1/9/2014 1/13/2014 1.46    1/20/2014 1/21/2014 1.33    2/13/2014 1.35 (H) 39 (L)   4/16/2014 6/11/2014 6/20/2014 6/20/2014 6/20/2014 6/20/2014 6/20/2014 1.25 (H) 43 (L)   6/27/2014 6/27/2014 6/27/2014 6/27/2014 6/27/2014 2/20/2015 1.14 (H) 48 (L)   6/11/2015 1.08 (H) 51 (L)   FINDINGS:     Right kidney: Measures 13.1 cm in length. Mildly thinned, echogenic  renal cortex. Unchanged 1.2 cm cortical cyst. No focal mass. No  hydronephrosis.     Left kidney: Measures 13.1 cm in length. Mildly thin, echogenic renal  cortex. No focal mass. No hydronephrosis. 1.5 cm cortical cyst.     Bladder: Unremarkable.         IMPRESSION:  1. Thin, echogenic renal cortices consistent with medical renal  disease.  2. No hydronephrosis.      Kathryn Basilio MD

## 2018-04-11 NOTE — NURSING NOTE
Dermatology Rooming Note    Supriya Herr's goals for this visit include:   Chief Complaint   Patient presents with     Derm Problem     Supriya is here for a transant skin check, she's on the list for a kidney transplant.  States a concerning area on her chest.         Viky Baird, JOSE FRANCISCON

## 2018-04-11 NOTE — PROGRESS NOTES
SUBJECTIVE:  Supriya comes in with her daughter today because of some issues with her skin.  She is in a wheelchair.  She has had a distal left  leg  amputationShe has on her right  foot some calloused tissue that has been quite tender, and that was one concern today.  She also has a significant dermatitis  under both breasts and in the groin, suggesting  intertrigo.  It has been very itchy, and it has been recommended that she use an antifungal along with some over-the-counter hydrocortisone for this.      OBJECTIVE:  A very pleasant lady with significant issues related to her other disabilities.  I checked her face, neck, chest, back, breasts, abdomen and legs, and we found numerous nevi and macular lesions, but nothing concerning.  She did have the erythematous, intertrigo-like process in the groin and under the breasts. Erythematous but dry without satellite elements.  No pustules or features of candida were noted. .  On the lower cleavage, there was a similar process.   Overall, a somewhat nonspecific,intertrigo described as very itchy.  Possibly a contact reaction to products use to combat the intertrigo.     ASSESSMENT:  Intertrigo, calluses on the foot. Benign nevi. No evidence of skin cancer or precancers.      PLAN:     1.  Use 40% urea for the foot calluses as a harmless desquamating agent.    2.  For the intertrigo, triamcinolone 0.025, a weak ointment to use once a day to control itch. Follow with body powder, and  proceed that with a dilute acetic acid soak twice a day if at all possible. (Three tablespoons of vinegar to a quart of water will produce 0.25% acetic acid solution).     Recheck or call re:  the intertrigo problem in a month or so if not improved. Reassured about find no dangerous skin lesions.     MEDICATIONS:  Reviewed.        ALLERGIES:  Reviewed.

## 2018-04-11 NOTE — MR AVS SNAPSHOT
After Visit Summary   4/11/2018    Supriya Herr    MRN: [id number]           Patient Information     Date Of Birth          1950        Visit Information        Provider Department      4/11/2018 2:30 PM ABIMAEL Shepard MD [facility] Dermatology        Care Instructions    For the foot calloses, suggest 40% urea cream  (must be 40). Get from [employer]. Apply to sites and if possible cover with plastic wrap then a bandaid or piece of tape - this will slowly soften the callous tissue and allow for a pumice stone or sanding device.     For the rash under the breasts and in the cleavage area, will Rx an mild steroid ointment to use daily along with an absorbing powder to reduce friction. Also consider vinegar soaks (3 TBS cider vinegar to 1 qt tapwater) once or twice a day to these sites. I don't think the problem represents true yeast infection. Also try the ointment on the red patch on the buttocks - once a day.               Follow-ups after your visit        Your next 10 appointments already scheduled     Apr 23, 2018  8:30 AM CDT   US AORTIC IMAGING with UCUSV2   [facility] Imaging Center US ([facility])    [address] [place] [address] [phone]           Please bring a list of your medicines (including vitamins, minerals and over-the-counter drugs). Also, tell your doctor about any allergies you may have. Wear comfortable clothes and leave your valuables at home.  Adults: No eating or drinking for 8 hours before the exam. You may take medicine with a small sip of water.  Children: - Children 6+ years: No food or drink for 6 hours before exam. - Children 1-5 years: No food or drink for 4 hours before exam. - Infants, breast-fed: may have breast milk up to 2 hours before exam. - Infants, formula: may have bottle until 4 hours before exam.  Please call the Imaging Department at your exam site with any questions.            Apr 23, 2018   9:00 AM CDT   US AORTA/IVC/ILIAC DUPLEX COMPLETE with UCUSV2   [facility] Imaging Center [place] ([facility])    [address] [place] [address]  [address] [facility] [id number] [phone]           Please bring a list of your medicines (including vitamins, minerals and over-the-counter drugs). Also, tell your doctor about any allergies you may have. Wear comfortable clothes and leave your valuables at home.  Adults: No eating or drinking for 8 hours before the exam. You may take medicine with a small sip of water.  Children: - Children 6+ years: No food or drink for 6 hours before exam. - Children 1-5 years: No food or drink for 4 hours before exam. - Infants, breast-fed: may have breast milk up to 2 hours before exam. - Infants, formula: may have bottle until 4 hours before exam.  Please call the Imaging Department at your exam site with any questions.            Apr 23, 2018 10:30 AM CDT   FULL PULMONARY FUNCTION with  PFL D   [facility] Pulmonary Function Testing ([facility])    [address] [place] [id number] [phone]            Apr 23, 2018 12:15 PM CDT   (Arrive by 12:00 PM)   MA SCREENING DIGITAL BILATERAL with UCBCMA1   [facility] Breast [place] [facility] ([facility])    [address] [place] [id number] [phone]           Do not use any powder, lotion or deodorant under your arms or on your breast. If you do, we will ask you to remove it before your exam.  Wear comfortable, two-piece clothing.  If you have any allergies, tell your care team.  Bring any previous mammograms from other facilities or have them mailed to the breast center. Three-dimensional (3D) mammograms are available at [place] locations in [facility], [facility], [facility], [place], [place], and [place]. [facility] locations include [place] and [facility] in [place]. Benefits  "of 3D mammograms include: - Improved rate of cancer detection - Decreases your chance of having to go back for more tests, which means fewer: - \"False-positive\" results (This means that there is an abnormal area but it isn't cancer.) - Invasive testing procedures, such as a biopsy or surgery - Can provide clearer images of the breast if you have dense breast tissue. 3D mammography is an optional exam that anyone can have with a 2D mammogram. It doesn't replace or take the place of a 2D mammogram. 2D mammograms remain an effective screening test for all women.  Not all insurance companies cover the cost of a 3D mammogram. Check with your insurance.            Apr 23, 2018  1:00 PM CDT   (Arrive by 12:45 PM)   CT CHEST LOW DOSE NON CONTRAST with UCCT2   [facility] Imaging [place] ([facility])    [address]  1st Floor  [facility] [address] [phone]           Please bring any scans or X-rays taken at other hospitals, if similar tests were done. Also bring a list of your medicines, including vitamins, minerals and over-the-counter drugs. It is safest to leave personal items at home.  Be sure to tell your doctor:   If you have any allergies.   If there s any chance you are pregnant.   If you are breastfeeding.  You do not need to do anything special to prepare for this exam.  Please wear loose clothing, such as a sweat suit or jogging clothes. Avoid snaps, zippers and other metal. We may ask you to undress and put on a hospital gown.            Apr 23, 2018  2:30 PM CDT   (Arrive by 2:15 PM)   NEW PANCREAS/KIDNEY TRANSPLANT WORK-UP with Milton Guadarrama MD   [facility] Heart Care ([facility] [place])    [address]  [address]  [facility] [address] [phone]            Apr 24, 2018  1:00 PM CDT   Infusion 120 with UC SPEC INFUSION, UC 42 ATC   [facility] Specialty and Procedure ([facility])    [address]  [address]  [facility] [address] [phone]            2018   Procedure with Kurt Maki MD   [facility] [place], Endoscopy ([facility])    [address] [address] [phone]           [facility] is located on the corner of [facility] [facility] [address] and [facility] [address] [facility] on the [facility]. It is easily accessible from virtually any point in the [place] area, via [address].            2018 12:40 PM CDT   (Arrive by 12:25 PM)   RETURN FOOT/ANKLE with Eleazar Pack DPM   [facility] ([facility])    [address]  4th Floor  [facility] [id number] [phone]              Who to contact     Please call your clinic at [phone] to:    Ask questions about your health    Make or cancel appointments    Discuss your medicines    Learn about your test results    Speak to your doctor            Additional Information About Your Visit        GTx Information     GTx is an electronic gateway that provides easy, online access to your medical records. With GTx, you can request a clinic appointment, read your test results, renew a prescription or communicate with your care team.     To sign up for GTx visit the website at [email or web address]   You will be asked to enter the access code listed below, as well as some personal information. Please follow the directions to create your username and password.     Your access code is: [email or web address]  Expires: 2018  4:04 PM     Your access code will  in 90 days. If you need help or a new code, please contact your [facility] or call [phone] for assistance.        Care EveryWhere ID     This is your Care EveryWhere ID. This could be used by other organizations to access your [place] medical records  [id number]          Blood Pressure from Last 3 Encounters:   03/30/18 136/48   03/29/18 159/52   03/02/18 137/70    Weight from Last 3 Encounters:   04/11/18 85.7 kg (189 lb)   04/03/18 85.3 kg (188 lb)   03/30/18 85.3 kg (188 lb)              Today, you had the following     No orders found for display       Primary Care Provider Office Phone # Fax #    Noam Luis Jackson [phone]       [address] [facility] [address]  [facility] [address]        Equal Access to Services     [facility]: Hadii aad indira hadasho Soomaali, waaxda luqadaha, qaybta kaalmada ademaria alejandra, madeline sood . So [facility] [phone].    ATENCIÓN: Si habla español, tiene a santoro disposición servicios gratuitos de asistencia lingüística. [facility] [phone].    We comply with applicable federal civil rights laws and [place] laws. We do not discriminate on the basis of race, color, national origin, age, disability, sex, sexual orientation, or gender identity.            Thank you!     Thank you for choosing [facility]  for your care. Our goal is always to provide you with excellent care. Hearing back from our patients is one way we can continue to improve our services. Please take a few minutes to complete the written survey that you may receive in the mail after your visit with us. Thank you!             Your Updated Medication List - Protect others around you: Learn how to safely use, store and throw away your medicines at www.disposemymeds.org.          This list is accurate as of 4/11/18  2:57 PM.  Always use your most recent med list.                   Brand Name Dispense Instructions for use Diagnosis    ACE/ARB/ARNI NOT PRESCRIBED (INTENTIONAL)      Please choose reason not prescribed, below    CKD (chronic kidney disease) stage 5, GFR less than 15 ml/min (H)       albuterol 108 (90 Base) MCG/ACT Inhaler    PROAIR HFA/PROVENTIL HFA/VENTOLIN HFA    3 Inhaler    Inhale 2 puffs into the lungs every 6 hours  as needed for shortness of breath / dyspnea or wheezing    Cough       ASPIRIN NOT PRESCRIBED    INTENTIONAL    0 each    1 each continuous prn Antiplatelet medication not prescribed intentionally due to current nosebleeds    Anemia due to blood loss, acute       atorvastatin 20 MG tablet    LIPITOR    90 tablet    Take 1 tablet (20 mg) by mouth daily    Hyperlipidemia LDL goal <100       * blood glucose monitoring test strip    ONETOUCH ULTRA    200 strip    Use to test blood sugars 2 times daily or as directed.    Type 2 diabetes mellitus with stage 3 chronic kidney disease, without long-term current use of insulin (H)       * blood glucose monitoring test strip    ONETOUCH ULTRA    200 strip    Test your blood sugar 3-4 times per day.    Type 2 diabetes mellitus with hyperglycemia, with long-term current use of insulin (H)       CALCITRIOL PO      Take by mouth daily Unsure of dosage        carvedilol 12.5 MG tablet    COREG    60 tablet    Take 1 tablet (12.5 mg) by mouth 2 times daily (with meals)    Essential hypertension with goal blood pressure less than 140/90       clotrimazole 1 % cream    LOTRIMIN    60 g    Apply topically 2 times daily    Intertrigo       ferrous sulfate 325 (65 Fe) MG tablet    IRON    180 tablet    Take 1 tablet (325 mg) by mouth 2 times daily    Iron deficiency       * furosemide 40 MG tablet    LASIX    30 tablet    Take 1 tablet (40 mg) by mouth daily    Lymphedema of both lower extremities       * furosemide 20 MG tablet    LASIX    30 tablet    Take 1 tablet (20 mg) by mouth At Bedtime    Lymphedema of both lower extremities, Essential hypertension with goal blood pressure less than 140/90       hydrocortisone 1 % cream    CORTAID    60 g    Apply sparingly to affected area two times daily for 14 days.    Intertrigo       insulin glargine 100 UNIT/ML injection    LANTUS    3 mL    Inject 20 Units Subcutaneous At Bedtime    Type 2 diabetes mellitus with diabetic neuropathy, with  long-term current use of insulin (H)       insulin pen needle 31G X 6 MM    ULTICARE MINI    100 each    Use daily or as directed.    Type 2 diabetes, HbA1c goal < 7% (H)       lactobacillus rhamnosus (GG) capsule     60 capsule    Take 1 capsule by mouth 2 times daily    Cellulitis of right leg       miconazole 2 % powder    MICATIN; MICRO GUARD    71 g    Apply topically 2 times daily    Fungal dermatitis       MULTIVITAMIN PO      1 tablet by mouth daily        NovoLOG FLEXPEN 100 UNIT/ML injection   Generic drug:  insulin aspart     15 mL    Inject 4 units SQ with breakfast, lunch and dinner.    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    Edema of lower extremity       order for DME     1 Device    1 SAD light    Seasonal affective disorder (H)       sertraline 25 MG tablet    ZOLOFT    30 tablet    Take 1 tablet (25 mg) by mouth daily    NICOLE (generalized anxiety disorder)       Urea 20 % Crea cream     85 g    Apply topically daily    Xerosis cutis, Tyloma       * Notice:  This list has 7 medication(s) that are the same as other medications prescribed  for you. Read the directions carefully, and ask your doctor or other care provider to review them with you.

## 2018-04-11 NOTE — PROGRESS NOTES
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 (ASCP)    Processed and screened at [facility]    CLINICAL HISTO RY:    Post Menopausal, Previous normal pap  Date of Last Pap: 8/27/12,    Papanicolaou Test Limitations:  Cervical cytology is a screening test with   limited sensitivity; regular  screening is critical for cancer prevention; Pap tests are primarily   effective for the diagnosis/prevention of  squamous cell carcinoma, not adenocarcinomas or other cancers.    TESTING LAB LOCATION:  [address]  [phone]    COLLECTION SITE:  Client:  [facility]  Location: [facility] (B)     HPV High Risk Types DNA Cervical    Collection Time: 03/30/18  3:06 PM   Result Value Ref Range    HPV Source SurePath     HPV 16 DNA Negative NEG^Negative    HPV 18 DNA  Negative NEG^Negative    Other HR HPV Negative NEG^Negative    Final Diagnosis This patient's sample is negative for HPV DNA.     Specimen Description Cervical Cells    UNOS Unacceptable Antigens    Collection Time: 04/03/18 12:00 AM   Result Value Ref Range    Protocol Cutoff      Unacceptable Antigen B:46 48 50 DQ:7 8 9    UNOS cPRA    Collection Time: 04/03/18 12:00 AM   Result Value Ref Range    UNOS cPRA 58    Hemoglobin    Collection Time: 04/11/18  9:59 AM   Result Value Ref Range    Hemoglobin 9.4 (L) 11.7 - 15.7 g/dL     CKD Review Creatinine (Serum) GFR, Estimated   Latest Ref Rng 0.52 - 1.04 mg/dL >60 mL/min/1.7m2   5/27/2004 0.60 >80   9/22/2004 12/27/2004 0.60 >80   5/31/2005 0.60 >80   6/13/2005 0.70 >80   8/10/2005     8/12/2005     11/10/2005 0.60 >80   2/8/2006     7/6/2006     10/11/2006     10/25/2006 0.94 65   11/6/2006     4/3/2007 0.56 (L) >90   4/18/2007 4/21/2007 0.67 >90   5/16/2007 5/23/2007 6/27/2007 0.84 74   7/6/2007 11/12/2007 0.72 88   2/27/2008 0.76 83   5/28/2008 6/26/2008 0.63 >90   7/9/2008 11/17/2008 0.88 66   11/20/2008     1/5/2009     3/4/2009 0.59 >90   3/16/2009     7/23/2009 0.73 81   7/30/2009 8/14/2009 12/30/2009 0.64 >90   1/5/2010 5/12/2010 0.68 88   5/17/2010 8/4/2010 0.69 87   10/25/2010     10/29/2010     12/27/2010     12/29/2010     1/27/2011     4/11/2011 0.60 >90   7/13/2011 12/8/2011 12/8/2011 12/8/2011 0.65 >90   12/21/2011 0.73 81   2/9/2012 0.69 86   8/27/2012 0.97 58 (L)   12/4/2012     12/5/2012     12/13/2012     12/13/2012     12/17/2012     3/8/2013     3/8/2013     3/20/2013     4/9/2013     4/11/2013     5/3/2013     5/3/2013 0.90 63   5/3/2013     5/6/2013     5/6/2013     5/6/2013     5/6/2013     5/14/2013     6/20/2013     7/18/2013     7/18/2013     7/25/2013     8/8/2013     11/7/2013 1.17 (H) 47 (L)   11/18/2013 11/21/2013 1.07 (H) 52 (L)   12/11/2013 12/30/2013 12/30/2013      12/30/2013 1.09 (H) 51 (L)   12/30/2013 12/30/2013 12/30/2013 12/30/2013 12/30/2013 12/30/2013 12/31/2013 12/31/2013 1.12 (H) 49 (L)   12/31/2013 12/31/2013 12/31/2013 12/31/2013 1/1/2014 1/1/2014 1/1/2014 1.14 (H) 48 (L)   1/1/2014 1/1/2014 1/1/2014 1/1/2014 1/1/2014 1/2/2014 1/2/2014 1/2/2014 1/6/2014 1/7/2014 1.22    1/9/2014 1/13/2014 1.46    1/20/2014 1/21/2014 1.33    2/13/2014 1.35 (H) 39 (L)   4/16/2014 6/11/2014 6/20/2014 6/20/2014 6/20/2014 6/20/2014 6/20/2014 1.25 (H) 43 (L)   6/27/2014 6/27/2014 6/27/2014 6/27/2014 6/27/2014 2/20/2015 1.14 (H) 48 (L)   6/11/2015 1.08 (H) 51 (L)   FINDINGS:     Right kidney: Measures 13.1 cm in length. Mildly thinned, echogenic  renal cortex. Unchanged 1.2 cm cortical cyst. No focal mass. No  hydronephrosis.     Left kidney: Measures 13.1 cm in length. Mildly thin, echogenic renal  cortex. No focal mass. No hydronephrosis. 1.5 cm cortical cyst.     Bladder: Unremarkable.         IMPRESSION:  1. Thin, echogenic renal cortices consistent with medical renal  disease.  2. No hydronephrosis.

## 2018-04-11 NOTE — PATIENT INSTRUCTIONS
For the foot calloses, suggest 40% urea cream  (must be 40). Get from [employer]. Apply to sites and if possible cover with plastic wrap then a bandaid or piece of tape - this will slowly soften the callous tissue and allow for a pumice stone or sanding device.     For the rash under the breasts and in the cleavage area, will Rx an mild steroid ointment to use daily along with an absorbing powder to reduce friction. Also consider vinegar soaks (3 TBS cider vinegar to 1 qt tapwater) once or twice a day to these sites. I don't think the problem represents true yeast infection. Also try the ointment on the red patch on the buttocks - once a day.

## 2018-04-11 NOTE — LETTER
4/11/2018       RE: Supriya Herr  [address]  [place] [address]     Dear Colleague,    Thank you for referring your patient, Supriya Herr, to the [facility] DERMATOLOGY at [facility]. Please see a copy of my visit note below.    SUBJECTIVE:  Supriya comes in with her daughter today because of some issues with her skin.  She is in a wheelchair.  She has had a distal left  leg  amputationShe has on her right  foot some calloused tissue that has been quite tender, and that was one concern today.  She also has a significant dermatitis  under both breasts and in the groin, suggesting  intertrigo.  It has been very itchy, and it has been recommended that she use an antifungal along with some over-the-counter hydrocortisone for this.      OBJECTIVE:  A very pleasant lady with significant issues related to her other disabilities.  I checked her face, neck, chest, back, breasts, abdomen and legs, and we found numerous nevi and macular lesions, but nothing concerning.  She did have the erythematous, intertrigo-like process in the groin and under the breasts. Erythematous but dry without satellite elements.  No pustules or features of candida were noted. .  On the lower cleavage, there was a similar process.   Overall, a somewhat nonspecific,intertrigo described as very itchy.  Possibly a contact reaction to products use to combat the intertrigo.     ASSESSMENT:  Intertrigo, calluses on the foot. Benign nevi. No evidence of skin cancer or precancers.      PLAN:     1.  Use 40% urea for the foot calluses as a harmless desquamating agent.    2.  For the intertrigo, triamcinolone 0.025, a weak ointment to use once a day to control itch. Follow with body powder, and  proceed that with a dilute acetic acid soak twice a day if at all possible. (Three tablespoons of vinegar to a quart of water will produce 0.25% acetic acid solution).     Recheck or call re:  the intertrigo  problem in a month or so if not improved. Reassured about find no dangerous skin lesions.     MEDICATIONS:  Reviewed.        ALLERGIES:  Reviewed.           Again, thank you for allowing me to participate in the care of your patient.      Sincerely,    ABIMAEL Shepard MD

## 2018-04-11 NOTE — MR AVS SNAPSHOT
After Visit Summary   4/11/2018    Supriya Herr    MRN: [id number]           Patient Information     Date Of Birth          1950        Visit Information        Provider Department      4/11/2018 10:45 AM Kathryn Basilio MD [facility] Nephrology         Follow-ups after your visit        Your next 10 appointments already scheduled     Apr 11, 2018  2:30 PM CDT   (Arrive by 2:15 PM)   New Patient Visit with ABIMAEL Shepard MD   [facility] Dermatology ([facility])    [address] [place] [id number]   [phone]            Apr 23, 2018  8:30 AM CDT   US AORTIC IMAGING with UCUSV2   [facility] Imaging [place] ([facility])    [address] [place] [id number] [phone]           Please bring a list of your medicines (including vitamins, minerals and over-the-counter drugs). Also, tell your doctor about any allergies you may have. Wear comfortable clothes and leave your valuables at home.  Adults: No eating or drinking for 8 hours before the exam. You may take medicine with a small sip of water.  Children: - Children 6+ years: No food or drink for 6 hours before exam. - Children 1-5 years: No food or drink for 4 hours before exam. - Infants, breast-fed: may have breast milk up to 2 hours before exam. - Infants, formula: may have bottle until 4 hours before exam.  Please call the Imaging Department at your exam site with any questions.            Apr 23, 2018  9:00 AM CDT   US AORTA/IVC/ILIAC DUPLEX COMPLETE with UCUSV2   [facility] Imaging Center US ([facility])    [address] [place] [id number] [phone]           Please bring a list of your medicines (including vitamins, minerals and over-the-counter drugs). Also, tell your doctor about any allergies you may have. Wear comfortable clothes and leave your valuables at home.   "Adults: No eating or drinking for 8 hours before the exam. You may take medicine with a small sip of water.  Children: - Children 6+ years: No food or drink for 6 hours before exam. - Children 1-5 years: No food or drink for 4 hours before exam. - Infants, breast-fed: may have breast milk up to 2 hours before exam. - Infants, formula: may have bottle until 4 hours before exam.  Please call the Imaging Department at your exam site with any questions.            Apr 23, 2018 10:30 AM CDT   FULL PULMONARY FUNCTION with  PFL D   [facility] Pulmonary Function Testing ([facility])    [address]  3rd Floor  [facility] [id number] [phone]            Apr 23, 2018 12:15 PM CDT   (Arrive by 12:00 PM)   MA SCREENING DIGITAL BILATERAL with UCBCMA1   [facility] Breast Center Imaging ([facility])    [address]  2nd [employer] [id number] [phone]           Do not use any powder, lotion or deodorant under your arms or on your breast. If you do, we will ask you to remove it before your exam.  Wear comfortable, two-piece clothing.  If you have any allergies, tell your care team.  Bring any previous mammograms from other facilities or have them mailed to the breast center. Three-dimensional (3D) mammograms are available at [facility] locations in [facility], [facility], and [place]. [facility] locations include [place] [facility] in [place]. Benefits of 3D mammograms include: - Improved rate of cancer detection - Decreases your chance of having to go back for more tests, which means fewer: - \"False-positive\" results (This means that there is an abnormal area but it isn't cancer.) - Invasive testing procedures, such as a biopsy or surgery - Can provide clearer images of the breast if you have dense breast tissue. 3D mammography is an optional exam that anyone can have with " a 2D mammogram. It doesn't replace or take the place of a 2D mammogram. 2D mammograms remain an effective screening test for all women.  Not all insurance companies cover the cost of a 3D mammogram. Check with your insurance.            Apr 23, 2018  1:00 PM CDT   (Arrive by 12:45 PM)   CT CHEST LOW DOSE NON CONTRAST with UCCT2   [facility] Imaging [place] ([facility])    [address]  1st Floor  [place] [id number] [phone]           Please bring any scans or X-rays taken at other hospitals, if similar tests were done. Also bring a list of your medicines, including vitamins, minerals and over-the-counter drugs. It is safest to leave personal items at home.  Be sure to tell your doctor:   If you have any allergies.   If there s any chance you are pregnant.   If you are breastfeeding.  You do not need to do anything special to prepare for this exam.  Please wear loose clothing, such as a sweat suit or jogging clothes. Avoid snaps, zippers and other metal. We may ask you to undress and put on a hospital gown.            Apr 23, 2018  2:30 PM CDT   (Arrive by 2:15 PM)   NEW PANCREAS/KIDNEY TRANSPLANT WORK-UP with Milton Guadarrama MD   [facility] Heart Care ([facility])    [address]  [address]  [place] [phone] [phone]            Apr 24, 2018  1:00 PM CDT   Infusion 120 with UC SPEC INFUSION, UC 42 ATC   [facility] Specialty and Procedure ([facility])    [address]  [address]  [place] [phone] [phone]            Apr 26, 2018   Procedure with Kurt Maki MD   [facility] [place] Endoscopy ([facility])    [address] [id number] [phone]           The [facility] is located on the corner of [facility] and [facility] [address] [facility] on the [facility].  "It is easily accessible from virtually any point in the [facility] area, via V-45 and Z-[id number].              Who to contact     If you have questions or need follow up information about today's clinic visit or your schedule please contact [facility] NEPHROLOGY directly at [phone].  Normal or non-critical lab and imaging results will be communicated to you by MyChart, letter or phone within 4 business days after the clinic has received the results. If you do not hear from us within 7 days, please contact the clinic through MyChart or phone. If you have a critical or abnormal lab result, we will notify you by phone as soon as possible.  Submit refill requests through [employer] or call your pharmacy and they will forward the refill request to us. Please allow 3 business days for your refill to be completed.          Additional Information About Your Visit        MyChart Information     [employer] lets you send messages to your doctor, view your test results, renew your prescriptions, schedule appointments and more. To sign up, go to [email or web address] . Click on \"Log in\" on the left side of the screen, which will take you to the Welcome page. Then click on \"Sign up Now\" on the right side of the page.     You will be asked to enter the access code listed below, as well as some personal information. Please follow the directions to create your username and password.     Your access code is: [email or web address]  Expires: 2018  4:04 PM     Your access code will  in 90 days. If you need help or a new code, please call your [place] clinic or [phone].        Care EveryWhere ID     This is your Care EveryWhere ID. This could be used by other organizations to access your [place] medical records  [id number]        Your Vitals Were     Pulse Temperature Height Pulse Oximetry BMI (Body Mass Index)       58 98.3  F (36.8  C) (Oral) 1.67 m (5' 5.75\") 99% 30.74 kg/m2        Blood Pressure from Last 3 Encounters:   18 " 136/48   03/29/18 159/52   03/02/18 137/70    Weight from Last 3 Encounters:   04/11/18 85.7 kg (189 lb)   04/03/18 85.3 kg (188 lb)   03/30/18 85.3 kg (188 lb)              Today, you had the following     No orders found for display       Primary Care Provider Office Phone # Fax #    Noam Luis Jackson [phone]       [address] [facility] [address]  [facility] [address]        Equal Access to Services     [facility]: Hadii aad ku hadasho Soomaali, waaxda luqadaha, qaybta kaalmada adeegyada, waxcarla sood . So [facility] [phone].    ATENCIÓN: Si habla español, tiene a santoro disposición servicios gratuitos de asistencia lingüística. [facility] [phone].    We comply with applicable federal civil rights laws and [place] laws. We do not discriminate on the basis of race, color, national origin, age, disability, sex, sexual orientation, or gender identity.            Thank you!     Thank you for choosing [facility] NEPHROLOGY  for your care. Our goal is always to provide you with excellent care. Hearing back from our patients is one way we can continue to improve our services. Please take a few minutes to complete the written survey that you may receive in the mail after your visit with us. Thank you!             Your Updated Medication List - Protect others around you: Learn how to safely use, store and throw away your medicines at www.disposemymeds.org.          This list is accurate as of 4/11/18 12:05 PM.  Always use your most recent med list.                   Brand Name Dispense Instructions for use Diagnosis    ACE/ARB/ARNI NOT PRESCRIBED (INTENTIONAL)      Please choose reason not prescribed, below    CKD (chronic kidney disease) stage 5, GFR less than 15 ml/min (H)       albuterol 108 (90 Base) MCG/ACT Inhaler    PROAIR HFA/PROVENTIL HFA/VENTOLIN HFA    3 Inhaler    Inhale 2 puffs into the lungs every 6 hours as needed for shortness of breath / dyspnea or wheezing     Cough       ASPIRIN NOT PRESCRIBED    INTENTIONAL    0 each    1 each continuous prn Antiplatelet medication not prescribed intentionally due to current nosebleeds    Anemia due to blood loss, acute       atorvastatin 20 MG tablet    LIPITOR    90 tablet    Take 1 tablet (20 mg) by mouth daily    Hyperlipidemia LDL goal <100       * blood glucose monitoring test strip    ONETOUCH ULTRA    200 strip    Use to test blood sugars 2 times daily or as directed.    Type 2 diabetes mellitus with stage 3 chronic kidney disease, without long-term current use of insulin (H)       * blood glucose monitoring test strip    ONETOUCH ULTRA    200 strip    Test your blood sugar 3-4 times per day.    Type 2 diabetes mellitus with hyperglycemia, with long-term current use of insulin (H)       CALCITRIOL PO      Take by mouth daily Unsure of dosage        carvedilol 12.5 MG tablet    COREG    60 tablet    Take 1 tablet (12.5 mg) by mouth 2 times daily (with meals)    Essential hypertension with goal blood pressure less than 140/90       clotrimazole 1 % cream    LOTRIMIN    60 g    Apply topically 2 times daily    Intertrigo       ferrous sulfate 325 (65 Fe) MG tablet    IRON    180 tablet    Take 1 tablet (325 mg) by mouth 2 times daily    Iron deficiency       * furosemide 40 MG tablet    LASIX    30 tablet    Take 1 tablet (40 mg) by mouth daily    Lymphedema of both lower extremities       * furosemide 20 MG tablet    LASIX    30 tablet    Take 1 tablet (20 mg) by mouth At Bedtime    Lymphedema of both lower extremities, Essential hypertension with goal blood pressure less than 140/90       hydrocortisone 1 % cream    CORTAID    60 g    Apply sparingly to affected area two times daily for 14 days.    Intertrigo       insulin glargine 100 UNIT/ML injection    LANTUS    3 mL    Inject 20 Units Subcutaneous At Bedtime    Type 2 diabetes mellitus with diabetic neuropathy, with long-term current use of insulin (H)       insulin pen  needle 31G X 6 MM    ULTICARE MINI    100 each    Use daily or as directed.    Type 2 diabetes, HbA1c goal < 7% (H)       lactobacillus rhamnosus (GG) capsule     60 capsule    Take 1 capsule by mouth 2 times daily    Cellulitis of right leg       miconazole 2 % powder    MICATIN; MICRO GUARD    71 g    Apply topically 2 times daily    Fungal dermatitis       MULTIVITAMIN PO      1 tablet by mouth daily        NovoLOG FLEXPEN 100 UNIT/ML injection   Generic drug:  insulin aspart     15 mL    Inject 4 units SQ with breakfast, lunch and dinner.    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    Edema of lower extremity       order for DME     1 Device    1 SAD light    Seasonal affective disorder (H)       sertraline 25 MG tablet    ZOLOFT    30 tablet    Take 1 tablet (25 mg) by mouth daily    NICOLE (generalized anxiety disorder)       Urea 20 % Crea cream     85 g    Apply topically daily    Xerosis cutis, Tyloma       * Notice:  This list has 7 medication(s) that are the same as other medications prescribed for you. Read the directions carefully, and ask your  doctor or other care provider to review them with you.

## 2018-04-12 ENCOUNTER — TELEPHONE (OUTPATIENT)
Dept: FAMILY MEDICINE | Facility: CLINIC | Age: 68
End: 2018-04-12

## 2018-04-12 LAB
M TB TUBERC IFN-G BLD QL: NEGATIVE
M TB TUBERC IFN-G/MITOGEN IGNF BLD: 0 IU/ML

## 2018-04-12 NOTE — TELEPHONE ENCOUNTER
Per patient, blood sugar was 78 when she checked it today before lunch, she was told that she shouldn't let her blood sugar get lower than 120. She is wondering if she should be worried about it. Also stated that it is possible that she accidentally took too much lantus last night, but if so was not on purpose.    Writer informed patient that number is a little bit low for during the day. It is normal for a fasting blood sugar level. Per patient, wasn't very hungry this morning and just wanted toast. Discussed that her lower reading could be d/t that, but given the fact that it is a low number for her, she should monitor her BS closely over the next day or two especially since patient stated that she did not have any accompanying symptoms of low blood sugar. Writer informed patient to continue checking her BS before meals as she is and let us know if she continues to have lower readings. Per patient she is on a strict diet for diabetes and CKD. Patient will call if any further questions or for continued low BS readings.    Ana Luisa Garcia RN  [facility]

## 2018-04-12 NOTE — TELEPHONE ENCOUNTER
Reason for call:  Symptom   Symptom or request: Pt states that she just checked her blood sugar and it was 78. She stated that she feels completely fine, but wants to make sure there's nothing more wrong. This is the first time her sugars have been that low.     Duration (how long have symptoms been present): 1 day  Have you been treated for this before? Yes    Additional comments: Pt stated that she did not eat breakfast today    Phone number to reach patient:  Home number on file [phone] (home)    Best Time:  Any    Can we leave a detailed message on this number?  YES

## 2018-04-13 ENCOUNTER — ALLIED HEALTH/NURSE VISIT (OUTPATIENT)
Dept: NURSING | Facility: CLINIC | Age: 68
End: 2018-04-13
Payer: MEDICARE

## 2018-04-13 DIAGNOSIS — Z23 NEED FOR HEPATITIS B VACCINATION: ICD-10-CM

## 2018-04-13 DIAGNOSIS — Z53.9 NO SHOW: Primary | ICD-10-CM

## 2018-04-13 PROCEDURE — 90746 HEPB VACCINE 3 DOSE ADULT IM: CPT

## 2018-04-13 NOTE — MR AVS SNAPSHOT
After Visit Summary   4/13/2018    Supriya Herr    MRN: [id number]           Patient Information     Date Of Birth          1950        Visit Information        Provider Department      4/13/2018 4:00 PM LISANDRO FRANCIS MA/LPN [facility]        Today's Diagnoses     NO SHOW    -  1    Need for hepatitis B vaccination           Follow-ups after your visit        Your next 10 appointments already scheduled     Apr 23, 2018  8:30 AM CDT   US AORTIC IMAGING with UCUSV2   [facility] ([facility])    [address] [place] [id number] [phone]           Please bring a list of your medicines (including vitamins, minerals and over-the-counter drugs). Also, tell your doctor about any allergies you may have. Wear comfortable clothes and leave your valuables at home.  Adults: No eating or drinking for 8 hours before the exam. You may take medicine with a small sip of water.  Children: - Children 6+ years: No food or drink for 6 hours before exam. - Children 1-5 years: No food or drink for 4 hours before exam. - Infants, breast-fed: may have breast milk up to 2 hours before exam. - Infants, formula: may have bottle until 4 hours before exam.  Please call the Imaging Department at your exam site with any questions.            Apr 23, 2018  9:00 AM CDT   US AORTA/IVC/ILIAC DUPLEX COMPLETE with UCUSV2   [facility] ([facility])    [address] [place] [address] [id number] [phone]           Please bring a list of your medicines (including vitamins, minerals and over-the-counter drugs). Also, tell your doctor about any allergies you may have. Wear comfortable clothes and leave your valuables at home.  Adults: No eating or drinking for 8 hours before the exam. You may take medicine with a small sip of water.  Children: - Children 6+ years: No food or drink for 6 hours  "before exam. - Children 1-5 years: No food or drink for 4 hours before exam. - Infants, breast-fed: may have breast milk up to 2 hours before exam. - Infants, formula: may have bottle until 4 hours before exam.  Please call the Imaging Department at your exam site with any questions.            Apr 23, 2018 10:30 AM CDT   FULL PULMONARY FUNCTION with UC PFL D   [facility] Function Testing ([facility])    [address]  3rd Floor  [facility] [id number] [phone]            Apr 23, 2018 12:15 PM CDT   (Arrive by 12:00 PM)   MA SCREENING DIGITAL BILATERAL with UCBCMA1   [facility] ([facility])    [address]  2nd Floor  [facility] [id number] [phone]           Do not use any powder, lotion or deodorant under your arms or on your breast. If you do, we will ask you to remove it before your exam.  Wear comfortable, two-piece clothing.  If you have any allergies, tell your care team.  Bring any previous mammograms from other facilities or have them mailed to the breast center. Three-dimensional (3D) mammograms are available at [place] locations in [facility], [facility], [facility], and [place]. [facility] locations include [place] and [facility] in [place]. Benefits of 3D mammograms include: - Improved rate of cancer detection - Decreases your chance of having to go back for more tests, which means fewer: - \"False-positive\" results (This means that there is an abnormal area but it isn't cancer.) - Invasive testing procedures, such as a biopsy or surgery - Can provide clearer images of the breast if you have dense breast tissue. 3D mammography is an optional exam that anyone can have with a 2D mammogram. It doesn't replace or take the place of a 2D mammogram. 2D mammograms remain an effective screening test for all women.  Not all insurance companies " cover the cost of a 3D mammogram. Check with your insurance.            Apr 23, 2018  1:00 PM CDT   (Arrive by 12:45 PM)   CT CHEST LOW DOSE NON CONTRAST with UCCT2   [facility] [place] ([facility] [place])    [address]  1st Floor  [place] [id number] [phone]           Please bring any scans or X-rays taken at other hospitals, if similar tests were done. Also bring a list of your medicines, including vitamins, minerals and over-the-counter drugs. It is safest to leave personal items at home.  Be sure to tell your doctor:   If you have any allergies.   If there s any chance you are pregnant.   If you are breastfeeding.  You do not need to do anything special to prepare for this exam.  Please wear loose clothing, such as a sweat suit or jogging clothes. Avoid snaps, zippers and other metal. We may ask you to undress and put on a hospital gown.            Apr 23, 2018  2:30 PM CDT   (Arrive by 2:15 PM)   NEW PANCREAS/KIDNEY TRANSPLANT WORK-UP with Milton Guadarrama MD   [facility] ([facility] [place])    [address]  [address]  [place] [id number] [phone]            Apr 24, 2018  1:00 PM CDT   Infusion 120 with UC SPEC INFUSION, UC 42 ATC   [facility] [place] [facility] ([facility])    [address]  [address]  [place] [id number] [phone]            Apr 26, 2018   Procedure with Kurt Maki MD   [facility] Endoscopy ([facility])    [address] [address] [phone]           The [facility] is located on the corner of [address] and [address] on the [address]. It is easily accessible from virtually any point in [place] area, via [address] and [address].            Apr 27, 2018 12:40 PM CDT   (Arrive by 12:25 PM)   RETURN  "FOOT/ANKLE with Eleazar Pack DPM   [facility] ([facility])    [address]  [address] [facility] [address] [phone]              Who to contact     If you have questions or need follow up information about today's clinic visit or your schedule please contact [facility] directly at [phone].  Normal or non-critical lab and imaging results will be communicated to you by eROIhart, letter or phone within 4 business days after the clinic has received the results. If you do not hear from us within 7 days, please contact the clinic through eROIhart or phone. If you have a critical or abnormal lab result, we will notify you by phone as soon as possible.  Submit refill requests through Crescentrating or call your pharmacy and they will forward the refill request to us. Please allow 3 business days for your refill to be completed.          Additional Information About Your Visit        Crescentrating Information     Crescentrating lets you send messages to your doctor, view your test results, renew your prescriptions, schedule appointments and more. To sign up, go to [email or web address] . Click on \"Log in\" on the left side of the screen, which will take you to the Welcome page. Then click on \"Sign up Now\" on the right side of the page.     You will be asked to enter the access code listed below, as well as some personal information. Please follow the directions to create your username and password.     Your access code is: [email or web address]  Expires: 2018  4:04 PM     Your access code will  in 90 days. If you need help or a new code, please call your [facility] or [phone].        Care EveryWhere ID     This is your Care EveryWhere ID. This could be used by other organizations to access your [place] medical records  [id number]         Blood Pressure from Last 3 Encounters:   18 136/48   18 159/52   18 137/70    Weight " from Last 3 Encounters:   04/11/18 189 lb (85.7 kg)   04/03/18 188 lb (85.3 kg)   03/30/18 188 lb (85.3 kg)              Today, you had the following     No orders found for display       Primary Care Provider Office Phone # Fax #    Noam Luis Jackson [phone]       [address] [facility] [address]  [facility] [address]        Equal Access to Services     [facility] : Hadii aad ku hadasho Soomaali, waaxda luqadaha, qaybta kaalmada adeegyada, waxay idiin hayaan adeeg kharash la'amy . So [facility] [phone].    ATENCIÓN: Si habla español, tiene a santoro disposición servicios gratuitos de asistencia lingüística. [facility] [phone].    We comply with applicable federal civil rights laws and [place] laws. We do not discriminate on the basis of race, color, national origin, age, disability, sex, sexual orientation, or gender identity.            Thank you!     Thank you for choosing [facility]  for your care. Our goal is always to provide you with excellent care. Hearing back from our patients is one way we can continue to improve our services. Please take a few minutes to complete the written survey that you may receive in the mail after your visit with us. Thank you!             Your Updated Medication List - Protect others around you: Learn how to safely use, store and throw away your medicines at www.disposemymeds.org.          This list is accurate as of 4/13/18 11:59 PM.  Always use your most recent med list.                   Brand Name Dispense Instructions for use Diagnosis    ACE/ARB/ARNI NOT PRESCRIBED (INTENTIONAL)      Please choose reason not prescribed, below    CKD (chronic kidney disease) stage 5, GFR less than 15 ml/min (H)       albuterol 108 (90 Base) MCG/ACT Inhaler    PROAIR HFA/PROVENTIL HFA/VENTOLIN HFA    3 Inhaler    Inhale 2 puffs into the lungs every 6 hours as needed for shortness of breath / dyspnea or wheezing    Cough       ASPIRIN NOT PRESCRIBED     INTENTIONAL    0 each    1 each continuous prn Antiplatelet medication not prescribed intentionally due to current nosebleeds    Anemia due to blood loss, acute       atorvastatin 20 MG tablet    LIPITOR    90 tablet    Take 1 tablet (20 mg) by mouth daily    Hyperlipidemia LDL goal <100       * blood glucose monitoring test strip    ONETOUCH ULTRA    200 strip    Use to test blood sugars 2 times daily or as directed.    Type 2 diabetes mellitus with stage 3 chronic kidney disease, without long-term current use of insulin (H)       * blood glucose monitoring test strip    ONETOUCH ULTRA    200 strip    Test your blood sugar 3-4 times per day.    Type 2 diabetes mellitus with hyperglycemia, with long-term current use of insulin (H)       CALCITRIOL PO      Take by mouth daily Unsure of dosage        carvedilol 12.5 MG tablet    COREG    60 tablet    Take 1 tablet (12.5 mg) by mouth 2 times daily (with meals)    Essential hypertension with goal blood pressure less than 140/90       clotrimazole 1 % cream    LOTRIMIN    60 g    Apply topically 2 times daily    Intertrigo       ferrous sulfate 325 (65 Fe) MG tablet    IRON    180 tablet    Take 1 tablet (325 mg) by mouth 2 times daily    Iron deficiency       * furosemide 40 MG tablet    LASIX    30 tablet    Take 1 tablet (40 mg) by mouth daily    Lymphedema of both lower extremities       * furosemide 20 MG tablet    LASIX    30 tablet    Take 1 tablet (20 mg) by mouth At Bedtime    Lymphedema of both lower extremities, Essential hypertension with goal blood pressure less than 140/90       hydrocortisone 1 % cream    CORTAID    60 g    Apply sparingly to affected area two times daily for 14 days.    Intertrigo       insulin glargine 100 UNIT/ML injection    LANTUS    3 mL    Inject 20 Units Subcutaneous At Bedtime    Type 2 diabetes mellitus with diabetic neuropathy, with long-term current use of insulin (H)       insulin pen needle 31G X 6 MM    ULTICARE MINI    100  each    Use daily or as directed.    Type 2 diabetes, HbA1c goal < 7% (H)       lactobacillus rhamnosus (GG) capsule     60 capsule    Take 1 capsule by mouth 2 times daily    Cellulitis of right leg       miconazole 2 % powder    MICATIN; MICRO GUARD    71 g    Apply topically 2 times daily    Fungal dermatitis       MULTIVITAMIN PO      1 tablet by mouth daily        NovoLOG FLEXPEN 100 UNIT/ML injection   Generic drug:  insulin aspart     15 mL    Inject 4 units SQ with breakfast, lunch and dinner.    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    Edema of lower extremity       order for DME     1 Device    1 SAD light    Seasonal affective disorder (H)       sertraline 25 MG tablet    ZOLOFT    30 tablet    Take 1 tablet (25 mg) by mouth daily    NICOLE (generalized anxiety disorder)       triamcinolone 0.025 % ointment    KENALOG    80 g    Apply to sites of dermatitis under the breasts, chest, buttocks - once a day.    Dermatitis       Urea 20 % Crea cream     85 g    Apply topically daily    Xerosis cutis, Tyloma       * Notice:  This list has 7  medication(s) that are the same as other medications prescribed for you. Read the directions carefully, and ask your doctor or other care provider to review them with you.

## 2018-04-19 ENCOUNTER — TELEPHONE (OUTPATIENT)
Dept: GASTROENTEROLOGY | Facility: CLINIC | Age: 68
End: 2018-04-19

## 2018-04-19 DIAGNOSIS — Z12.11 ENCOUNTER FOR SCREENING COLONOSCOPY: Primary | ICD-10-CM

## 2018-04-19 NOTE — TELEPHONE ENCOUNTER
Patient scheduled for colonoscopy     Indication for procedure. Screening     Referring Provider. Noam Jackson MD    ? No     Arrival time verified? 1025 am.     Facility location verified? [address]     Instructions given regarding prep and procedure. Instructed pt's daughter to touch base with PCP about insulin regimen; verbalized understanding. Transportation policy reviewed. Denied prep review; will call with questions.     Prep Type Golytely     Are you taking any anticoagulants or blood thinners? Denies     Instructions given? Yes, email and mail     Electronic implanted devices? Denies     Pre procedure teaching completed? Yes    Transportation from procedure? Yes, daughter or sister     H&P / Pre op physical completed? N/A    Girish Cruz RN

## 2018-04-23 ENCOUNTER — OFFICE VISIT (OUTPATIENT)
Dept: CARDIOLOGY | Facility: CLINIC | Age: 68
End: 2018-04-23
Attending: INTERNAL MEDICINE
Payer: MEDICARE

## 2018-04-23 ENCOUNTER — RADIANT APPOINTMENT (OUTPATIENT)
Dept: ULTRASOUND IMAGING | Facility: CLINIC | Age: 68
End: 2018-04-23
Attending: PHYSICIAN ASSISTANT
Payer: MEDICARE

## 2018-04-23 ENCOUNTER — RADIANT APPOINTMENT (OUTPATIENT)
Dept: MAMMOGRAPHY | Facility: CLINIC | Age: 68
End: 2018-04-23
Attending: FAMILY MEDICINE
Payer: MEDICARE

## 2018-04-23 ENCOUNTER — RADIANT APPOINTMENT (OUTPATIENT)
Dept: CT IMAGING | Facility: CLINIC | Age: 68
End: 2018-04-23
Attending: PHYSICIAN ASSISTANT
Payer: MEDICARE

## 2018-04-23 VITALS
OXYGEN SATURATION: 99 % | BODY MASS INDEX: 32.59 KG/M2 | HEIGHT: 65 IN | WEIGHT: 195.6 LBS | SYSTOLIC BLOOD PRESSURE: 172 MMHG | HEART RATE: 61 BPM | DIASTOLIC BLOOD PRESSURE: 71 MMHG

## 2018-04-23 DIAGNOSIS — Z01.810 PRE-OPERATIVE CARDIOVASCULAR EXAMINATION: ICD-10-CM

## 2018-04-23 DIAGNOSIS — E78.5 HYPERLIPIDEMIA LDL GOAL <100: ICD-10-CM

## 2018-04-23 DIAGNOSIS — G47.33 OBSTRUCTIVE SLEEP APNEA: ICD-10-CM

## 2018-04-23 DIAGNOSIS — E78.5 HYPERLIPIDEMIA: ICD-10-CM

## 2018-04-23 DIAGNOSIS — N18.9 CHRONIC RENAL FAILURE: ICD-10-CM

## 2018-04-23 DIAGNOSIS — I10 ESSENTIAL HYPERTENSION: ICD-10-CM

## 2018-04-23 DIAGNOSIS — E11.9 DIABETES MELLITUS, TYPE 2 (H): ICD-10-CM

## 2018-04-23 DIAGNOSIS — I10 HYPERTENSION GOAL BP (BLOOD PRESSURE) < 140/90: ICD-10-CM

## 2018-04-23 DIAGNOSIS — Z85.828 HISTORY OF SKIN CANCER: ICD-10-CM

## 2018-04-23 DIAGNOSIS — Z76.82 AWAITING ORGAN TRANSPLANT: ICD-10-CM

## 2018-04-23 DIAGNOSIS — N18.5 CKD (CHRONIC KIDNEY DISEASE) STAGE 5, GFR LESS THAN 15 ML/MIN (H): Primary | ICD-10-CM

## 2018-04-23 DIAGNOSIS — Z76.82 ORGAN TRANSPLANT CANDIDATE: ICD-10-CM

## 2018-04-23 DIAGNOSIS — Z87.891 HISTORY OF TOBACCO USE: ICD-10-CM

## 2018-04-23 DIAGNOSIS — Z12.31 VISIT FOR SCREENING MAMMOGRAM: ICD-10-CM

## 2018-04-23 DIAGNOSIS — I50.32 CHRONIC DIASTOLIC HEART FAILURE (H): Primary | ICD-10-CM

## 2018-04-23 LAB — RADIOLOGIST FLAGS: NORMAL

## 2018-04-23 PROCEDURE — 99204 OFFICE O/P NEW MOD 45 MIN: CPT | Mod: ZP | Performed by: INTERNAL MEDICINE

## 2018-04-23 PROCEDURE — G0463 HOSPITAL OUTPT CLINIC VISIT: HCPCS | Mod: 25,ZF

## 2018-04-23 ASSESSMENT — PAIN SCALES - GENERAL: PAINLEVEL: NO PAIN (0)

## 2018-04-23 NOTE — NURSING NOTE
Chief Complaint   Patient presents with     New Patient     pre-kidney tx eval     Vitals were taken and medications were reconciled.  Jagruti Schaefer RMA  12:40 PM

## 2018-04-23 NOTE — PATIENT INSTRUCTIONS
You were seen at the [facility] clinic today.  You saw Dr. Guadarrama  Here are your Instructions:    1.  Lexiscan stress test.  2.  Follow up in 1 year.      Joanna Sellers RN  Nurse Care Coordinator  Office:  [phone] option #1, then #3 & ask for Joanna (nurse line)  Fax:  [phone]  After Hours:  [phone]  Appointments:  [phone] option #1, then option #1
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
## 2018-04-23 NOTE — MR AVS SNAPSHOT
After Visit Summary   4/23/2018    Supriya Herr    MRN: [id number]           Patient Information     Date Of Birth          1950        Visit Information        Provider Department      4/23/2018 2:30 PM Milton Guadarrama MD [facility]        Today's Diagnoses     Diastolic heart failure (H)    -  1      Care Instructions    You were seen at the [facility] clinic today.  You saw Dr. Guadarrama  Here are your Instructions:    1.  Lexiscan stress test.  2.  Follow up in 1 year.      Joanna Sellers RN  Nurse Care Coordinator  Office:  [phone] option #1, then #3 & ask for Joanna (nurse line)  Fax:  [phone]  After Hours:  [phone]  Appointments:  [phone] option #1, then option #1                        Follow-ups after your visit        Additional Services     Follow-Up with Cardiologist       Chiara                  Your next 10 appointments already scheduled     Apr 24, 2018  1:00 PM CDT   Infusion 120 with UC SPEC INFUSION, UC 42 ATC   [facility] Special[facility] Procedure ([facility] [place])    [address]  [address]  [place] [address] [phone]            Apr 26, 2018   Procedure with Kurt Maki MD   [facility], [place], Endoscopy ([facility])    [address] [address] [phone]           The [facility] is located on the corner of [address] and [address] on the [address]. It is easily accessible from virtually any point in the [place] area, via [address] and [address].            Apr 27, 2018 12:40 PM CDT   (Arrive by 12:25 PM)   RETURN FOOT/ANKLE with Eleazar Pack DPM   [facility] ([facility] [place])    [address]  4th Floor  [place] [address] [phone]            May 03, 2018  3:00 PM CDT    Infusion 120 with  SPEC INFUSION, UC 45 ATC   [facility] Specialty [facility] Procedure ([facility])    [address]  [address]  [place] [id number] [phone]            May 11, 2018 11:30 AM CDT   LAB with  LAB   [facility] Lab ([facility])    [address]  1st Floor  [place] [id number] [phone]           Please do not eat 10-12 hours before your appointment if you are coming in fasting for labs on lipids, cholesterol, or glucose (sugar). This does not apply to pregnant women. Water, hot tea and black coffee (with nothing added) are okay. Do not drink other fluids, diet soda or chew gum.            May 21, 2018  9:00 AM CDT   (Arrive by 8:45 AM)   NM INJECTION with UUNMINJ1   [facility], [place], Nuclear Medicine ([facility])    [address] [id number] [phone]            May 21, 2018  9:45 AM CDT   (Arrive by 9:30 AM)   NM SCAN3 with UUNM1   [facility], Nuclear Medicine ([facility])    [address] [address] [phone]            May 21, 2018 10:15 AM CDT   Ekg Stress Nm Lexiscan with [id number]    ELECTROCARDIOLOGY ([facility])    [address] [address]                 Future tests that were ordered for you today     Open Future Orders        Priority Expected Expires Ordered    Follow-Up with Cardiologist Routine 4/23/2019 4/23/2019 4/23/2018    Stress NM Lexiscan Routine  4/23/2019 4/23/2018            Who to contact     If you have questions or need follow up information about today's clinic visit or your schedule please contact [facility] directly at [phone].  Normal or non-critical lab and imaging results will be communicated to you by MyChart, letter or phone within 4 business days  "after the clinic has received the results. If you do not hear from us within 7 days, please contact the clinic through [facility] or phone. If you have a critical or abnormal lab result, we will notify you by phone as soon as possible.  Submit refill requests through [facility] [employer] [facility] or call your pharmacy and they will forward the refill request to us. Please allow 3 business days for your refill to be completed.          Additional Information About Your Visit        [facility] [employer] [facility] Information     [facility] [employer] [facility] lets you send messages to your doctor, view your test results, renew your prescriptions, schedule appointments and more. To sign up, go to [email or web address] . Click on \"Log in\" on the left side of the screen, which will take you to the Welcome page. Then click on \"Sign up Now\" on the right side of the page.     You will be asked to enter the access code listed below, as well as some personal information. Please follow the directions to create your username and password.     Your access code is: [email or web address]  Expires: 2018  4:04 PM     Your access code will  in 90 days. If you need help or a new code, please call your [place] clinic or [phone].        Care EveryWhere ID     This is your Care EveryWhere ID. This could be used by other organizations to access your [place] medical records  [id number]        Your Vitals Were     Pulse Height Pulse Oximetry BMI (Body Mass Index)          61 1.651 m (5' 5\") 99% 32.55 kg/m2         Blood Pressure from Last 3 Encounters:   18 172/71   18 136/48   18 159/52    Weight from Last 3 Encounters:   18 88.7 kg (195 lb 9.6 oz)   18 85.7 kg (189 lb)   18 85.3 kg (188 lb)               Primary Care Provider Office Phone # Fax #    Noam Jackson [phone]       [address]  [place] [address]        Equal Access to Services     KALYANI WARREN AH: Emeterio Lam, kristin crocker, karina " madeline washburntere chaudhari. So [facility] [phone].    ATENCIÓN: Si carolina ochoa, tiene a santoro disposición servicios gratuitos de asistencia lingüística. Renuka al [phone].    We comply with applicable federal civil rights laws and [place] laws. We do not discriminate on the basis of race, color, national origin, age, disability, sex, sexual orientation, or gender identity.            Thank you!     Thank you for choosing [facility]  for your care. Our goal is always to provide you with excellent care. Hearing back from our patients is one way we can continue to improve our services. Please take a few minutes to complete the written survey that you may receive in the mail after your visit with us. Thank you!             Your Updated Medication List - Protect others around you: Learn how to safely use, store and throw away your medicines at www.disposemymeds.org.          This list is accurate as of 4/23/18  3:28 PM.  Always use your most recent med list.                   Brand Name Dispense Instructions for use Diagnosis    ACE/ARB/ARNI NOT PRESCRIBED (INTENTIONAL)      Please choose reason not prescribed, below    CKD (chronic kidney disease) stage 5, GFR less than 15 ml/min (H)       albuterol 108 (90 Base) MCG/ACT Inhaler    PROAIR HFA/PROVENTIL HFA/VENTOLIN HFA    3 Inhaler    Inhale 2 puffs into the lungs every 6 hours as needed for shortness of breath / dyspnea or wheezing    Cough       ASPIRIN NOT PRESCRIBED    INTENTIONAL    0 each    1 each continuous prn Antiplatelet medication not prescribed intentionally due to current nosebleeds    Anemia due to blood loss, acute       atorvastatin 20 MG tablet    LIPITOR    90 tablet    Take 1 tablet (20 mg) by mouth daily    Hyperlipidemia LDL goal <100       * blood glucose monitoring test strip    ONETOUCH ULTRA    200 strip    Use to test blood sugars 2 times daily or as directed.    Type 2 diabetes mellitus with  stage 3 chronic kidney disease, without long-term current use of insulin (H)       * blood glucose monitoring test strip    ONETOUCH ULTRA    200 strip    Test your blood sugar 3-4 times per day.    Type 2 diabetes mellitus with hyperglycemia, with long-term current use of insulin (H)       CALCITRIOL PO      Take by mouth daily Unsure of dosage        carvedilol 12.5 MG tablet    COREG    60 tablet    Take 1 tablet (12.5 mg) by mouth 2 times daily (with meals)    Essential hypertension with goal blood pressure less than 140/90       clotrimazole 1 % cream    LOTRIMIN    60 g    Apply topically 2 times daily    Intertrigo       ferrous sulfate 325 (65 Fe) MG tablet    IRON    180 tablet    Take 1 tablet (325 mg) by mouth 2 times daily    Iron deficiency       * furosemide 40 MG tablet    LASIX    30 tablet    Take 1 tablet (40 mg) by mouth daily    Lymphedema of both lower extremities       * furosemide 20 MG tablet    LASIX    30 tablet    Take 1 tablet (20 mg) by mouth At Bedtime    Lymphedema of both lower extremities, Essential hypertension with goal blood pressure less than 140/90       hydrocortisone 1 % cream    CORTAID    60 g    Apply sparingly to affected area two times daily for 14 days.    Intertrigo       insulin glargine 100 UNIT/ML injection    LANTUS    3 mL    Inject 20 Units Subcutaneous At Bedtime    Type 2 diabetes mellitus with diabetic neuropathy, with long-term current use of insulin (H)       insulin pen needle 31G X 6 MM    ULTICARE MINI    100 each    Use daily or as directed.    Type 2 diabetes, HbA1c goal < 7% (H)       lactobacillus rhamnosus (GG) capsule     60 capsule    Take 1 capsule by mouth 2 times daily    Cellulitis of right leg       miconazole 2 % powder    MICATIN; MICRO GUARD    71 g    Apply topically 2 times daily    Fungal dermatitis       MULTIVITAMIN PO      1 tablet by mouth daily        NovoLOG FLEXPEN 100 UNIT/ML injection   Generic drug:  insulin aspart     15 mL     Inject 4 units SQ with breakfast, lunch and dinner.    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    Edema of lower extremity       order for DME     1 Device    1 SAD light    Seasonal affective disorder (H)       sertraline 25 MG tablet    ZOLOFT    30 tablet    Take 1 tablet (25 mg) by mouth daily    NICOLE (generalized anxiety disorder)       triamcinolone 0.025 % ointment    KENALOG    80 g    Apply to sites of dermatitis under the breasts, chest, buttocks - once a day.    Dermatitis       Urea 20 % Crea cream     85 g    Apply topically daily    Xerosis cutis, Tyloma       * Notice:  This list has 7 medication(s) that are the same as other medications prescribed for you. Read the directions carefully, and ask your doctor or other care provider to review them with you.
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
## 2018-04-24 ENCOUNTER — INFUSION THERAPY VISIT (OUTPATIENT)
Dept: INFUSION THERAPY | Facility: CLINIC | Age: 68
End: 2018-04-24
Attending: INTERNAL MEDICINE
Payer: MEDICARE

## 2018-04-24 VITALS
RESPIRATION RATE: 16 BRPM | HEART RATE: 56 BPM | DIASTOLIC BLOOD PRESSURE: 48 MMHG | TEMPERATURE: 97.3 F | OXYGEN SATURATION: 99 % | SYSTOLIC BLOOD PRESSURE: 159 MMHG

## 2018-04-24 DIAGNOSIS — N18.9 ANEMIA IN CHRONIC KIDNEY DISEASE: Primary | ICD-10-CM

## 2018-04-24 DIAGNOSIS — D63.1 ANEMIA IN CHRONIC KIDNEY DISEASE: Primary | ICD-10-CM

## 2018-04-24 PROCEDURE — 25000128 H RX IP 250 OP 636: Mod: ZF | Performed by: INTERNAL MEDICINE

## 2018-04-24 PROCEDURE — 96365 THER/PROPH/DIAG IV INF INIT: CPT

## 2018-04-24 RX ADMIN — FERRIC CARBOXYMALTOSE INJECTION 750 MG: 50 INJECTION, SOLUTION INTRAVENOUS at 13:44

## 2018-04-24 NOTE — MR AVS SNAPSHOT
After Visit Summary   4/24/2018    Supriya Herr    MRN: [id number]           Patient Information     Date Of Birth          1950        Visit Information        Provider Department      4/24/2018 1:00 PM UC 42 ATC; UC SPEC INFUSION [facility] [place] [facility] Procedure        Today's Diagnoses     Anemia in chronic kidney disease    -  1      Care Instructions    Dear Supriya Herr    Thank you for choosing [facility] ([facility]) for your infusion.  The following information is a summary of our appointment as well as important reminders.          We look forward in seeing you on your next appointment here at [facility].  Please don t hesitate to call us at [phone] to reschedule any of your appointments or to speak with one of the [facility] registered nurses.  It was a pleasure taking care of you today.    Sincerely,    [facility]  Specialty Infusion [facility] Center  [address] [place] [address]  [address]  Phone:  [phone]    Ferric carboxymaltose injection  Brand Name: Injectafer  What is this medicine?  FERRIC CARBOXYMALTOSE (ferr-ik car-box-ee-mol-toes) is an iron complex. Iron is used to make healthy red blood cells, which carry oxygen and nutrients throughout the body. This medicine is used to treat anemia in people with chronic kidney disease or people who cannot take iron by mouth.  How should I use this medicine?  This medicine is for infusion into a vein. It is given by a health care professional in a hospital or clinic setting.  Talk to your pediatrician regarding the use of this medicine in children. Special care may be needed.  What side effects may I notice from receiving this medicine?  Side effects that you should report to your doctor or health care professional as soon as possible:    allergic reactions like skin rash, itching or hives, swelling of the face,  lips, or tongue    breathing problems    changes in blood pressure    feeling faint or lightheaded, falls    flushing, sweating, or hot feelings  Side effects that usually do not require medical attention (report to your doctor or health care professional if they continue or are bothersome):    changes in taste    constipation    dizziness    headache    nausea    pain, redness, or irritation at site where injected    vomiting  What may interact with this medicine?  Do not take this medicine with any of the following medications:    deferoxamine    dimercaprol    other iron products  This medicine may also interact with the following medications:    chloramphenicol    deferasirox  What if I miss a dose?  It is important not to miss your dose. Call your doctor or health care professional if you are unable to keep an appointment.  Where should I keep my medicine?  This drug is given in a hospital or clinic and will not be stored at home.  What should I tell my health care provider before I take this medicine?  They need to know if you have any of these conditions:    anemia not caused by low iron levels    high levels of iron in the blood    liver disease    an unusual or allergic reaction to iron, other medicines, foods, dyes, or preservatives    pregnant or trying to get pregnant    breast-feeding  What should I watch for while using this medicine?  Visit your doctor or health care professional regularly. Tell your doctor if your symptoms do not start to get better or if they get worse. You may need blood work done while you are taking this medicine.  You may need to follow a special diet. Talk to your doctor. Foods that contain iron include: whole grains/cereals, dried fruits, beans, or peas, leafy green vegetables, and organ meats (liver, kidney).  NOTE:This sheet is a summary. It may not cover all possible information. If you have questions about this medicine, talk to your doctor, pharmacist, or health care  provider. Copyright  2018 [employer]        Ferric carboxymaltose injection  Brand Name: Injectafer  What is this medicine?  FERRIC CARBOXYMALTOSE (ferr-ik car-box-ee-mol-toes) is an iron complex. Iron is used to make healthy red blood cells, which carry oxygen and nutrients throughout the body. This medicine is used to treat anemia in people with chronic kidney disease or people who cannot take iron by mouth.  How should I use this medicine?  This medicine is for infusion into a vein. It is given by a health care professional in a hospital or clinic setting.  Talk to your pediatrician regarding the use of this medicine in children. Special care may be needed.  What side effects may I notice from receiving this medicine?  Side effects that you should report to your doctor or health care professional as soon as possible:    allergic reactions like skin rash, itching or hives, swelling of the face, lips, or tongue    breathing problems    changes in blood pressure    feeling faint or lightheaded, falls    flushing, sweating, or hot feelings  Side effects that usually do not require medical attention (report to your doctor or health care professional if they continue or are bothersome):    changes in taste    constipation    dizziness    headache    nausea    pain, redness, or irritation at site where injected    vomiting  What may interact with this medicine?  Do not take this medicine with any of the following medications:    deferoxamine    dimercaprol    other iron products  This medicine may also interact with the following medications:    chloramphenicol    deferasirox  What if I miss a dose?  It is important not to miss your dose. Call your doctor or health care professional if you are unable to keep an appointment.  Where should I keep my medicine?  This drug is given in a hospital or clinic and will not be stored at home.  What should I tell my health care provider before I take this medicine?  They need to know  if you have any of these conditions:    anemia not caused by low iron levels    high levels of iron in the blood    liver disease    an unusual or allergic reaction to iron, other medicines, foods, dyes, or preservatives    pregnant or trying to get pregnant    breast-feeding  What should I watch for while using this medicine?  Visit your doctor or health care professional regularly. Tell your doctor if your symptoms do not start to get better or if they get worse. You may need blood work done while you are taking this medicine.  You may need to follow a special diet. Talk to your doctor. Foods that contain iron include: whole grains/cereals, dried fruits, beans, or peas, leafy green vegetables, and organ meats (liver, kidney).  NOTE:This sheet is a summary. It may not cover all possible information. If you have questions about this medicine, talk to your doctor, pharmacist, or health care provider. Copyright  2018 [employer]                Follow-ups after your visit        Your next 10 appointments already scheduled     Apr 27, 2018 12:40 PM CDT   (Arrive by 12:25 PM)   RETURN FOOT/ANKLE with Eleazar Pack DPM   [facility] ([facility])    [address] [place] [address]  4th Floor  [facility] [id number] [phone]            May 03, 2018  3:00 PM CDT   Infusion 120 with UC SPEC INFUSION, UC 45 ATC   [facility] Specialty and Procedure ([facility])    [address] [place] [address]  [address]  [facility] [id number] [phone]            May 11, 2018 11:30 AM CDT   LAB with UC LAB   [facility] ([facility])    [address] [place] [address]  1st Floor  [facility] [id number] [phone]           Please do not eat 10-12 hours before your appointment if you are coming in fasting for labs on lipids, cholesterol, or glucose (sugar). This does not apply to pregnant women. Water, hot tea and black coffee  (with nothing added) are okay. Do not drink other fluids, diet soda or chew gum.            May 21, 2018  9:00 AM CDT   (Arrive by 8:45 AM)   NM INJECTION with UUNMINJ1   [facility], Nuclear Medicine ([facility])    [address] [facility] [address]   [phone]            May 21, 2018  9:45 AM CDT   (Arrive by 9:30 AM)   NM SCAN3 with UUNM1   [facility], Nuclear Medicine ([facility])    [address] [facility] [address]   [phone]            May 21, 2018 10:15 AM CDT   Ekg Stress Nm Lexiscan with UUEKGNMS   [facility]OLOG[facility] ([facility])    [address] [facility] [address]               May 21, 2018 11:15 AM CDT   (Arrive by 11:00 AM)   NM MPI WITH LEXISCAN with UUNM1   [facility], Nuclear Medicine ([facility])    [address] [facility] [address]   [phone]           For a ONE day exam: Allow 3-4 hours for test. For a TWO day exam: Allow  minutes PER day for test.  On the day of your resting scan: Please stop eating 3 hours before the test. You may drink water or juice.  On the day of your stress test: Stop all caffeine 12 hours before the test. This includes coffee, tea, soda pop, chocolate and certain medicines (such as Anacin, Excedrin and NoDoz). Also avoid decaf coffee and tea, as these contain small amounts of caffeine.  Stop eating 3 hours before the test. You may drink water.  You may need to stop some medicines before the test. Follow your doctor s orders. - If you take a beta blocker: Do not take your beta-blocker on the day before your test, unless specifically told to by your doctor. And do not take it on the day of your test. Bring it with you to take after the test. - If you take Aggrenox or dipyridamole (Persantine, Permole), stop taking it 48  hours before your test. - If you take Viagra, Cialis or Levitra, stop taking it 48 hours before your test. - If you take theophylline or aminophylline, stop taking it 12 hours before your test.  Do not take nitrates on the day of your test. Do not wear your Nitro-Patch.  Please wear a loose two-piece outfit. If you will have an exercise test, bring rubber-soled walking shoes.  When you arrive, please tell us if you have a pacemaker or ICD (implantable defibrillator).  Please call your Imaging Department at your exam site with any questions.            May 30, 2018   Procedure with Barry Morel MD   [facility] [place], Endoscopy ([facility])    [address] [facility] [id number] [phone]           The [facility] is located on the corner of [address] [facility] and [address] on the [facility]. It is easily accessible from virtually any point [place] [place] [facility] [place], via [employer] and [address].            Jun 04, 2018  4:00 PM CDT   Lab with  LAB   [facility] ([facility])    [address]  1st Floor  [facility] [address] [phone]              Future tests that were ordered for you today     Open Future Orders        Priority Expected Expires Ordered    Follow-Up with Cardiologist Routine 4/23/2019 4/23/2019 4/23/2018    Stress NM Lexiscan Routine  4/23/2019 4/23/2018            Who to contact     If you have questions or need follow up information about today's clinic visit or your schedule please contact [facility] SPECIALTY [facility] PROCEDURE directly at [phone].  Normal or non-critical lab and imaging results will be communicated to you by MyChart, letter or phone within 4 business days after the clinic has received the results. If you do not hear from us within 7 days, please contact the clinic through MyChart or phone. If you have a critical or abnormal lab  "result, we will notify you by phone as soon as possible.  Submit refill requests through Outsmart or call your pharmacy and they will forward the refill request to us. Please allow 3 business days for your refill to be completed.          Additional Information About Your Visit        TiqIQhart Information     Outsmart lets you send messages to your doctor, view your test results, renew your prescriptions, schedule appointments and more. To sign up, go to [email or web address] . Click on \"Log in\" on the left side of the screen, which will take you to the Welcome page. Then click on \"Sign up Now\" on the right side of the page.     You will be asked to enter the access code listed below, as well as some personal information. Please follow the directions to create your username and password.     Your access code is: [email or web address]  Expires: 2018  4:04 PM     Your access code will  in 90 days. If you need help or a new code, please call your [place] clinic or [phone].        Care EveryWhere ID     This is your Care EveryWhere ID. This could be used by other organizations to access your [place] medical records  [id number]         Blood Pressure from Last 3 Encounters:   18 172/71   18 136/48   18 159/52    Weight from Last 3 Encounters:   18 88.7 kg (195 lb 9.6 oz)   18 85.7 kg (189 lb)   18 85.3 kg (188 lb)              Today, you had the following     No orders found for display       Primary Care Provider Office Phone # Fax #    Noam Jackson [phone]       [address] [address]        Equal Access to Services     KALYANI WARREN AH: Emeterio Lam, kristin crocker, karina parisi, madeline chaudhari. So [facility] [phone].    ATENCIÓN: Si habla español, tiene a santoro disposición servicios gratuitos de asistencia lingüística. Llame al [phone].    We comply with " applicable federal civil rights laws and [place] laws. We do not discriminate on the basis of race, color, national origin, age, disability, sex, sexual orientation, or gender identity.            Thank you!     Thank you for choosing [facility] SPECIALTY AND PROCEDURE  for your care. Our goal is always to provide you with excellent care. Hearing back from our patients is one way we can continue to improve our services. Please take a few minutes to complete the written survey that you may receive in the mail after your visit with us. Thank you!             Your Updated Medication List - Protect others around you: Learn how to safely use, store and throw away your medicines at www.disposemymeds.org.          This list is accurate as of 4/24/18  1:27 PM.  Always use your most recent med list.                   Brand Name Dispense Instructions for use Diagnosis    ACE/ARB/ARNI NOT PRESCRIBED (INTENTIONAL)      Please choose reason not prescribed, below    CKD (chronic kidney disease) stage 5, GFR less than 15 ml/min (H)       albuterol 108 (90 Base) MCG/ACT Inhaler    PROAIR HFA/PROVENTIL HFA/VENTOLIN HFA    3 Inhaler    Inhale 2 puffs into the lungs every 6 hours as needed for shortness of breath / dyspnea or wheezing    Cough       ASPIRIN NOT PRESCRIBED    INTENTIONAL    0 each    1 each continuous prn Antiplatelet medication not prescribed intentionally due to current nosebleeds    Anemia due to blood loss, acute       atorvastatin 20 MG tablet    LIPITOR    90 tablet    Take 1 tablet (20 mg) by mouth daily    Hyperlipidemia LDL goal <100       * blood glucose monitoring test strip    ONETOUCH ULTRA    200 strip    Use to test blood sugars 2 times daily or as directed.    Type 2 diabetes mellitus with stage 3 chronic kidney disease, without long-term current use of insulin (H)       * blood glucose monitoring test strip    ONETOUCH ULTRA    200 strip    Test your blood sugar 3-4 times per  day.    Type 2 diabetes mellitus with hyperglycemia, with long-term current use of insulin (H)       CALCITRIOL PO      Take by mouth daily Unsure of dosage        carvedilol 12.5 MG tablet    COREG    60 tablet    Take 1 tablet (12.5 mg) by mouth 2 times daily (with meals)    Essential hypertension with goal blood pressure less than 140/90       clotrimazole 1 % cream    LOTRIMIN    60 g    Apply topically 2 times daily    Intertrigo       ferrous sulfate 325 (65 Fe) MG tablet    IRON    180 tablet    Take 1 tablet (325 mg) by mouth 2 times daily    Iron deficiency       * furosemide 40 MG tablet    LASIX    30 tablet    Take 1 tablet (40 mg) by mouth daily    Lymphedema of both lower extremities       * furosemide 20 MG tablet    LASIX    30 tablet    Take 1 tablet (20 mg) by mouth At Bedtime    Lymphedema of both lower extremities, Essential hypertension with goal blood pressure less than 140/90       hydrocortisone 1 % cream    CORTAID    60 g    Apply sparingly to affected area two times daily for 14 days.    Intertrigo       insulin glargine 100 UNIT/ML injection    LANTUS    3 mL    Inject 20 Units Subcutaneous At Bedtime    Type 2 diabetes mellitus with diabetic neuropathy, with long-term current use of insulin (H)       insulin pen needle 31G X 6 MM    ULTICARE MINI    100 each    Use daily or as directed.    Type 2 diabetes, HbA1c goal < 7% (H)       lactobacillus rhamnosus (GG) capsule     60 capsule    Take 1 capsule by mouth 2 times daily    Cellulitis of right leg       miconazole 2 % powder    MICATIN; MICRO GUARD    71 g    Apply topically 2 times daily    Fungal dermatitis       MULTIVITAMIN PO      1 tablet by mouth daily        NovoLOG FLEXPEN 100 UNIT/ML injection   Generic drug:  insulin aspart     15 mL    Inject 4 units SQ with breakfast, lunch and dinner.    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    Edema of lower extremity       order for DME     1 Device    1 SAD light    Seasonal affective disorder (H)       sertraline 25 MG tablet    ZOLOFT    30 tablet    Take 1 tablet (25 mg) by mouth daily    NICOLE (generalized anxiety disorder)       triamcinolone 0.025 % ointment    KENALOG    80 g    Apply to sites of dermatitis under the breasts, chest, buttocks - once a day.    Dermatitis       Urea 20 % Crea cream     85 g    Apply topically daily    Xerosis cutis, Tyloma       * Notice:  This list has 7 medication(s) that are the same as other medications prescribed for you. Read the directions carefully, and ask your doctor or other care provider to review them with you.

## 2018-04-24 NOTE — PROGRESS NOTES
Nursing Note  Supriya Herr [facility] for:   Chief Complaint   Patient presents with     Infusion     Injectafer     During today's [facility] appointment, orders from  were completed.  Frequency: today is dose 1 of 2 total.    Progress note:  Patient identification verified by name and date of birth.  Assessment completed.  Vitals recorded in Doc Flowsheets.  Patient was provided with education regarding infusion and possible side effects.  Patient verbalized understanding.      needed: No  Premedications: were not ordered.  Infusion Rates: 500 ml/hr.  Approximate Infusion length:15 minutes.   Labs: were not ordered for this appointment.  Vascular access: peripheral IV placed today.  Treatment Conditions: patient denies fever, chills, signs of infection, recent illness, on antibiotics, productive cough or elevated temperature.  patient monitored for 30 minutes after medication was infused.  Patient tolerated infusion: well.    Drug Waste Record? No     Discharge Plan:   Follow up plan of care with: ongoing infusions at [facility].  Discharge instructions were reviewed with patient.  Patient/representative verbalized understanding of discharge instructions and all questions answered.  Patient discharged from [facility] in stable condition.    Sara Joaquin RN    Administrations This Visit     ferric carboxymaltose (INJECTAFER) 750 mg in sodium chloride 0.9 % 100 mL intermittent infusion     Admin Date Action Dose Route Administered By             04/24/2018 New Bag 750 mg Intravenous Sara Joaquin RN                          /50  Pulse 59  Temp 97.3  F (36.3  C)  Resp 16  SpO2 99%

## 2018-04-24 NOTE — PATIENT INSTRUCTIONS
Dear Supriya Herr    Thank you for choosing [facility] ([facility]) for your infusion.  The following information is a summary of our appointment as well as important reminders.          We look forward in seeing you on your next appointment here at [facility].  Please don t hesitate to call us at [phone] to reschedule any of your appointments or to speak with one of the [facility] registered nurses.  It was a pleasure taking care of you today.    Sincerely,    [facility]  [address] [place] [address]  [id number]  Phone:  [phone]    Ferric carboxymaltose injection  Brand Name: Injectafer  What is this medicine?  FERRIC CARBOXYMALTOSE (ferr-ik car-box-ee-mol-toes) is an iron complex. Iron is used to make healthy red blood cells, which carry oxygen and nutrients throughout the body. This medicine is used to treat anemia in people with chronic kidney disease or people who cannot take iron by mouth.  How should I use this medicine?  This medicine is for infusion into a vein. It is given by a health care professional in a hospital or clinic setting.  Talk to your pediatrician regarding the use of this medicine in children. Special care may be needed.  What side effects may I notice from receiving this medicine?  Side effects that you should report to your doctor or health care professional as soon as possible:    allergic reactions like skin rash, itching or hives, swelling of the face, lips, or tongue    breathing problems    changes in blood pressure    feeling faint or lightheaded, falls    flushing, sweating, or hot feelings  Side effects that usually do not require medical attention (report to your doctor or health care professional if they continue or are bothersome):    changes in taste    constipation    dizziness    headache    nausea    pain, redness, or irritation at site where  injected    vomiting  What may interact with this medicine?  Do not take this medicine with any of the following medications:    deferoxamine    dimercaprol    other iron products  This medicine may also interact with the following medications:    chloramphenicol    deferasirox  What if I miss a dose?  It is important not to miss your dose. Call your doctor or health care professional if you are unable to keep an appointment.  Where should I keep my medicine?  This drug is given in a hospital or clinic and will not be stored at home.  What should I tell my health care provider before I take this medicine?  They need to know if you have any of these conditions:    anemia not caused by low iron levels    high levels of iron in the blood    liver disease    an unusual or allergic reaction to iron, other medicines, foods, dyes, or preservatives    pregnant or trying to get pregnant    breast-feeding  What should I watch for while using this medicine?  Visit your doctor or health care professional regularly. Tell your doctor if your symptoms do not start to get better or if they get worse. You may need blood work done while you are taking this medicine.  You may need to follow a special diet. Talk to your doctor. Foods that contain iron include: whole grains/cereals, dried fruits, beans, or peas, leafy green vegetables, and organ meats (liver, kidney).  NOTE:This sheet is a summary. It may not cover all possible information. If you have questions about this medicine, talk to your doctor, pharmacist, or health care provider. Copyright  2018 Elsevier        Ferric carboxymaltose injection  Brand Name: Injectafer  What is this medicine?  FERRIC CARBOXYMALTOSE (ferr-ik car-box-ee-mol-toes) is an iron complex. Iron is used to make healthy red blood cells, which carry oxygen and nutrients throughout the body. This medicine is used to treat anemia in people with chronic kidney disease or people who cannot take iron by  mouth.  How should I use this medicine?  This medicine is for infusion into a vein. It is given by a health care professional in a hospital or clinic setting.  Talk to your pediatrician regarding the use of this medicine in children. Special care may be needed.  What side effects may I notice from receiving this medicine?  Side effects that you should report to your doctor or health care professional as soon as possible:    allergic reactions like skin rash, itching or hives, swelling of the face, lips, or tongue    breathing problems    changes in blood pressure    feeling faint or lightheaded, falls    flushing, sweating, or hot feelings  Side effects that usually do not require medical attention (report to your doctor or health care professional if they continue or are bothersome):    changes in taste    constipation    dizziness    headache    nausea    pain, redness, or irritation at site where injected    vomiting  What may interact with this medicine?  Do not take this medicine with any of the following medications:    deferoxamine    dimercaprol    other iron products  This medicine may also interact with the following medications:    chloramphenicol    deferasirox  What if I miss a dose?  It is important not to miss your dose. Call your doctor or health care professional if you are unable to keep an appointment.  Where should I keep my medicine?  This drug is given in a hospital or clinic and will not be stored at home.  What should I tell my health care provider before I take this medicine?  They need to know if you have any of these conditions:    anemia not caused by low iron levels    high levels of iron in the blood    liver disease    an unusual or allergic reaction to iron, other medicines, foods, dyes, or preservatives    pregnant or trying to get pregnant    breast-feeding  What should I watch for while using this medicine?  Visit your doctor or health care professional regularly. Tell your doctor  if your symptoms do not start to get better or if they get worse. You may need blood work done while you are taking this medicine.  You may need to follow a special diet. Talk to your doctor. Foods that contain iron include: whole grains/cereals, dried fruits, beans, or peas, leafy green vegetables, and organ meats (liver, kidney).  NOTE:This sheet is a summary. It may not cover all possible information. If you have questions about this medicine, talk to your doctor, pharmacist, or health care provider. Copyright  2018 Elsevier

## 2018-04-27 ENCOUNTER — OFFICE VISIT (OUTPATIENT)
Dept: ORTHOPEDICS | Facility: CLINIC | Age: 68
End: 2018-04-27
Payer: MEDICARE

## 2018-04-27 VITALS — HEIGHT: 65 IN | BODY MASS INDEX: 32.49 KG/M2 | WEIGHT: 195 LBS

## 2018-04-27 DIAGNOSIS — Z79.4 TYPE 2 DIABETES MELLITUS WITH DIABETIC NEUROPATHY, WITH LONG-TERM CURRENT USE OF INSULIN (H): Primary | ICD-10-CM

## 2018-04-27 DIAGNOSIS — L84 TYLOMA: ICD-10-CM

## 2018-04-27 DIAGNOSIS — E11.40 TYPE 2 DIABETES MELLITUS WITH DIABETIC NEUROPATHY, WITH LONG-TERM CURRENT USE OF INSULIN (H): Primary | ICD-10-CM

## 2018-04-27 NOTE — LETTER
"4/27/2018       RE: Supriya Herr  [address]  [facility] [address]     Dear Colleague,    Thank you for referring your patient, Supriya Herr, to the [facility] at [facility]. Please see a copy of my visit note below.    Chief Complaint:   Chief Complaint   Patient presents with     Foot Pain     pain on bottom of right foot x 2 weeks          Allergies   Allergen Reactions     Nkda [No Known Drug Allergies]          Subjective: Supriya is a 68 year old female who presents to the clinic today for a diabetic foot exam and management.  She presents with her daughter today.  Her daughter relates that she has been out on the deck wearing no shoes in the warmer weather.  Supriya relates that she is getting increased pain in the callus between the first and second toes on the right foot.  She relates that this is been happening for about 2 weeks.  Her nails are short today.  She continues to wear the diabetic shoe on the right foot.    ROS: No n/v/d/f/c/ns/sob/cp    Objective  Data Unavailable Data Unavailable Data Unavailable Data Unavailable 5' 5\" 195 lbs 0 oz  Hyperkeratotic lesion is noted in the first interspace plantarly on the right foot.  This is debrided away today, and a small laceration was noted in the area.  I explored the area but could not find a foreign body.  It was noted that going dorsally into the interspace, there was some purulent drainage noted.  This was underneath some epidermal lysis.  I debrided this away and evacuated all the purulent drainage.  No deep lesions were noted to the area.  Hyperkeratotic lesion noted to the plantar aspect of the first metatarsal head was also debrided.  No wounds or signs of infection noted to this today.  No erythema was noted to the foot.  No calor or edema noted.    Assessment: DM2 with neuropathy  Tylomas x 2.   The interspace tyloma was debrided with the above findings.     Plan:   - Pt seen " and evaluated  - Tyloma was debrided to the dermal level and all purulent drainage evacuated. Area cleansed with MicroKlenz and wrapped with a Silvercel and ACE. This should be performed daily and her daughter relates that she can do this.   - I reiterated that she always needs to be wearing shoegear. Hard-soled slippers in the house. She will wear these.   - Pt to return to clinic on Thursday in wound care. She is going with her family to [place] next Friday.       Again, thank you for allowing me to participate in the care of your patient.      Sincerely,    Eleazar Pack DPM

## 2018-04-27 NOTE — PROGRESS NOTES
"Chief Complaint:   Chief Complaint   Patient presents with     Foot Pain     pain on bottom of right foot x 2 weeks          Allergies   Allergen Reactions     Nkda [No Known Drug Allergies]          Subjective: Supriya is a 68 year old female who presents to the clinic today for a diabetic foot exam and management.  She presents with her daughter today.  Her daughter relates that she has been out on the deck wearing no shoes in the warmer weather.  Supriya relates that she is getting increased pain in the callus between the first and second toes on the right foot.  She relates that this is been happening for about 2 weeks.  Her nails are short today.  She continues to wear the diabetic shoe on the right foot.    ROS: No n/v/d/f/c/ns/sob/cp    Objective  Data Unavailable Data Unavailable Data Unavailable Data Unavailable 5' 5\" 195 lbs 0 oz  Hyperkeratotic lesion is noted in the first interspace plantarly on the right foot.  This is debrided away today, and a small laceration was noted in the area.  I explored the area but could not find a foreign body.  It was noted that going dorsally into the interspace, there was some purulent drainage noted.  This was underneath some epidermal lysis.  I debrided this away and evacuated all the purulent drainage.  No deep lesions were noted to the area.  Hyperkeratotic lesion noted to the plantar aspect of the first metatarsal head was also debrided.  No wounds or signs of infection noted to this today.  No erythema was noted to the foot.  No calor or edema noted.    Assessment: DM2 with neuropathy  Tylomas x 2.   The interspace tyloma was debrided with the above findings.     Plan:   - Pt seen and evaluated  - Tyloma was debrided to the dermal level and all purulent drainage evacuated. Area cleansed with MicroKlenz and wrapped with a Silvercel and ACE. This should be performed daily and her daughter relates that she can do this.   - I reiterated that she always needs to be wearing " shoegear. Hard-soled slippers in the house. She will wear these.   - Pt to return to clinic on Thursday in wound care. She is going with her family to [place] next Friday.

## 2018-04-27 NOTE — MR AVS SNAPSHOT
After Visit Summary   4/27/2018    Supriya Herr    MRN: [id number]           Patient Information     Date Of Birth          1950        Visit Information        Provider Department      4/27/2018 12:40 PM Eleazar Pack DPM [facility]         Follow-ups after your visit        Your next 10 appointments already scheduled     May 03, 2018  2:00 PM CDT   (Arrive by 1:45 PM)   Return Visit with Eleazar Pack DPM   [facility] ([facility])    [address]  4th Floor  [facility] [address]   [phone]            May 03, 2018  3:00 PM CDT   Infusion 120 with UC SPEC INFUSION, UC 45 ATC   [facility] ([facility])    [address]  [address]  [facility] [id number] [phone]            May 11, 2018 11:30 AM CDT   LAB with UC LAB   [facility] ([facility])    [address] [place] [address]  1st Floor  [facility] [id number] [phone]           Please do not eat 10-12 hours before your appointment if you are coming in fasting for labs on lipids, cholesterol, or glucose (sugar). This does not apply to pregnant women. Water, hot tea and black coffee (with nothing added) are okay. Do not drink other fluids, diet soda or chew gum.            May 21, 2018  9:00 AM CDT   (Arrive by 8:45 AM)   NM INJECTION with UUNMINJ1   [facility], Nuclear Medicine ([facility])    [address] [address] [phone]            May 21, 2018  9:45 AM CDT   (Arrive by 9:30 AM)   NM SCAN3 with UUNM1   [facility] Nuclear Medicine ([facility])    [address] [address] [phone]            May 21, 2018 10:15 AM CDT   Ekg Stress Nm Lexiscan with UUEKGNMS   [facility] ELECTROCARDIOLOGY ([place]  [facility])    [address] [facility] [address]               May 21, 2018 11:15 AM CDT   (Arrive by 11:00 AM)   NM MPI WITH LEXISCAN with UUNM1   [facility] [place], Nuclear Medicine ([facility])    [address] [place] [address]   [phone]           For a ONE day exam: Allow 3-4 hours for test. For a TWO day exam: Allow  minutes PER day for test.  On the day of your resting scan: Please stop eating 3 hours before the test. You may drink water or juice.  On the day of your stress test: Stop all caffeine 12 hours before the test. This includes coffee, tea, soda pop, chocolate and certain medicines (such as Anacin, Excedrin and NoDoz). Also avoid decaf coffee and tea, as these contain small amounts of caffeine.  Stop eating 3 hours before the test. You may drink water.  You may need to stop some medicines before the test. Follow your doctor s orders. - If you take a beta blocker: Do not take your beta-blocker on the day before your test, unless specifically told to by your doctor. And do not take it on the day of your test. Bring it with you to take after the test. - If you take Aggrenox or dipyridamole (Persantine, Permole), stop taking it 48 hours before your test. - If you take Viagra, Cialis or Levitra, stop taking it 48 hours before your test. - If you take theophylline or aminophylline, stop taking it 12 hours before your test.  Do not take nitrates on the day of your test. Do not wear your Nitro-Patch.  Please wear a loose two-piece outfit. If you will have an exercise test, bring rubber-soled walking shoes.  When you arrive, please tell us if you have a pacemaker or ICD (implantable defibrillator).  Please call your Imaging Department at your exam site with any questions.            May 30, 2018   Procedure with Barry Morel MD   [facility] [place], Endoscopy ([facility]  "[facility])    [address] [address] [phone]           The [facility] is located on the corner of [address] and [address] on the [address]. It is easily accessible from virtually any point in the [place] area, via [address].            2018  4:00 PM CDT   Lab with [facility] ([facility])    [address]  1st Floor  [facility] [id number] [phone]              Who to contact     Please call your clinic at [phone] to:    Ask questions about your health    Make or cancel appointments    Discuss your medicines    Learn about your test results    Speak to your doctor            Additional Information About Your Visit        [employer] Information     [employer] is an electronic gateway that provides easy, online access to your medical records. With [employer], you can request a clinic appointment, read your test results, renew a prescription or communicate with your care team.     To sign up for [employer] visit the website at [email or web address]   You will be asked to enter the access code listed below, as well as some personal information. Please follow the directions to create your username and password.     Your access code is: [email or web address]  Expires: 2018  4:04 PM     Your access code will  in 90 days. If you need help or a new code, please contact your [facility] or call [phone] for assistance.        Care EveryWhere ID     This is your Care EveryWhere ID. This could be used by other organizations to access your [place] medical records  [id number]        Your Vitals Were     Height BMI (Body Mass Index)                1.651 m (5' 5\") 32.45 kg/m2           Blood Pressure from Last 3 Encounters:   18 159/48   18 172/71   18 136/48    Weight from Last 3 Encounters:   18 88.5 kg (195 lb)   18 88.7 " kg (195 lb 9.6 oz)   04/11/18 85.7 kg (189 lb)              Today, you had the following     No orders found for display       Primary Care Provider Office Phone # Fax #    Noam Jackson [phone]       [address] [facility] [address]  [facility] [address]        Equal Access to Services     [facility]: Hadii aad ku hadasho Soomaali, waaxda luqadaha, qaybta kaalmada adeegyada, waxay idiin hayaan adeeg kharash la'aan . So [facility] [phone].    ATENCIÓN: Si habla español, tiene a santoro disposición servicios gratuitos de asistencia lingüística. Renuka al [phone].    We comply with applicable federal civil rights laws and [place] laws. We do not discriminate on the basis of race, color, national origin, age, disability, sex, sexual orientation, or gender identity.            Thank you!     Thank you for choosing [facility]  for your care. Our goal is always to provide you with excellent care. Hearing back from our patients is one way we can continue to improve our services. Please take a few minutes to complete the written survey that you may receive in the mail after your visit with us. Thank you!             Your Updated Medication List - Protect others around you: Learn how to safely use, store and throw away your medicines at www.disposemymeds.org.          This list is accurate as of 4/27/18  1:16 PM.  Always use your most recent med list.                   Brand Name Dispense Instructions for use Diagnosis    ACE/ARB/ARNI NOT PRESCRIBED (INTENTIONAL)      Please choose reason not prescribed, below    CKD (chronic kidney disease) stage 5, GFR less than 15 ml/min (H)       albuterol 108 (90 Base) MCG/ACT Inhaler    PROAIR HFA/PROVENTIL HFA/VENTOLIN HFA    3 Inhaler    Inhale 2 puffs into the lungs every 6 hours as needed for shortness of breath / dyspnea or wheezing    Cough       ASPIRIN NOT PRESCRIBED    INTENTIONAL    0 each    1 each continuous prn Antiplatelet  medication not prescribed intentionally due to current nosebleeds    Anemia due to blood loss, acute       atorvastatin 20 MG tablet    LIPITOR    90 tablet    Take 1 tablet (20 mg) by mouth daily    Hyperlipidemia LDL goal <100       * blood glucose monitoring test strip    ONETOUCH ULTRA    200 strip    Use to test blood sugars 2 times daily or as directed.    Type 2 diabetes mellitus with stage 3 chronic kidney disease, without long-term current use of insulin (H)       * blood glucose monitoring test strip    ONETOUCH ULTRA    200 strip    Test your blood sugar 3-4 times per day.    Type 2 diabetes mellitus with hyperglycemia, with long-term current use of insulin (H)       CALCITRIOL PO      Take by mouth daily Unsure of dosage        carvedilol 12.5 MG tablet    COREG    60 tablet    Take 1 tablet (12.5 mg) by mouth 2 times daily (with meals)    Essential hypertension with goal blood pressure less than 140/90       clotrimazole 1 % cream    LOTRIMIN    60 g    Apply topically 2 times daily    Intertrigo       ferrous sulfate 325 (65 Fe) MG tablet    IRON    180 tablet    Take 1 tablet (325 mg) by mouth 2 times daily    Iron deficiency       * furosemide 40 MG tablet    LASIX    30 tablet    Take 1 tablet (40 mg) by mouth daily    Lymphedema of both lower extremities       * furosemide 20 MG tablet    LASIX    30 tablet    Take 1 tablet (20 mg) by mouth At Bedtime    Lymphedema of both lower extremities, Essential hypertension with goal blood pressure less than 140/90       hydrocortisone 1 % cream    CORTAID    60 g    Apply sparingly to affected area two times daily for 14 days.    Intertrigo       insulin glargine 100 UNIT/ML injection    LANTUS    3 mL    Inject 20 Units Subcutaneous At Bedtime    Type 2 diabetes mellitus with diabetic neuropathy, with long-term current use of insulin (H)       insulin pen needle 31G X 6 MM    ULTICARE MINI    100 each    Use daily or as directed.    Type 2 diabetes, HbA1c  goal < 7% (H)       lactobacillus rhamnosus (GG) capsule     60 capsule    Take 1 capsule by mouth 2 times daily    Cellulitis of right leg       miconazole 2 % powder    MICATIN; MICRO GUARD    71 g    Apply topically 2 times daily    Fungal dermatitis       MULTIVITAMIN PO      1 tablet by mouth daily        NovoLOG FLEXPEN 100 UNIT/ML injection   Generic drug:  insulin aspart     15 mL    Inject 4 units SQ with breakfast, lunch and dinner.    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    Edema of lower extremity       order for DME     1 Device    1 SAD light    Seasonal affective disorder (H)       sertraline 25 MG tablet    ZOLOFT    30 tablet    Take 1 tablet (25 mg) by mouth daily    NICOLE (generalized anxiety disorder)       triamcinolone 0.025 % ointment    KENALOG    80 g    Apply to sites of dermatitis under the breasts, chest, buttocks - once a day.    Dermatitis       Urea 20 % Crea cream     85 g    Apply topically daily    Xerosis cutis, Tyloma       * Notice:  This list has 7 medication(s) that are the same as other medications prescribed for  you. Read the directions carefully, and ask your doctor or other care provider to review them with you.

## 2018-04-27 NOTE — NURSING NOTE
"Chief Complaint   Patient presents with     Foot Pain     pain on bottom of right foot x 2 weeks       Pain Assessment  Patient Currently in Pain: Yes  0-10 Pain Scale: 5  Primary Pain Location: Foot  Pain Orientation: Right  Alleviating Factors: Rest  Aggravating Factors: Standing, Walking               Ht 1.651 m (5' 5\")  Wt 88.5 kg (195 lb)  BMI 32.45 kg/m2    Allergies   Allergen Reactions     Nkda [No Known Drug Allergies]        Current Outpatient Prescriptions   Medication Sig Dispense Refill     ACE/ARB/ARNI NOT PRESCRIBED, INTENTIONAL, Please choose reason not prescribed, below       albuterol (PROAIR HFA, PROVENTIL HFA, VENTOLIN HFA) 108 (90 BASE) MCG/ACT inhaler Inhale 2 puffs into the lungs every 6 hours as needed for shortness of breath / dyspnea or wheezing 3 Inhaler 1     ASPIRIN NOT PRESCRIBED, INTENTIONAL, 1 each continuous prn Antiplatelet medication not prescribed intentionally due to current nosebleeds 0 each 0     atorvastatin (LIPITOR) 20 MG tablet Take 1 tablet (20 mg) by mouth daily 90 tablet 3     blood glucose monitoring (ONE TOUCH ULTRA) test strip Use to test blood sugars 2 times daily or as directed. 200 strip 3     blood glucose monitoring (ONETOUCH ULTRA) test strip Test your blood sugar 3-4 times per day. 200 strip 3     CALCITRIOL PO Take by mouth daily Unsure of dosage       carvedilol (COREG) 12.5 MG tablet Take 1 tablet (12.5 mg) by mouth 2 times daily (with meals) 60 tablet 11     clotrimazole (LOTRIMIN) 1 % cream Apply topically 2 times daily 60 g 3     ferrous sulfate (IRON) 325 (65 FE) MG tablet Take 1 tablet (325 mg) by mouth 2 times daily 180 tablet 1     furosemide (LASIX) 20 MG tablet Take 1 tablet (20 mg) by mouth At Bedtime 30 tablet 3     furosemide (LASIX) 40 MG tablet Take 1 tablet (40 mg) by mouth daily 30 tablet 11     hydrocortisone (CORTAID) 1 % cream Apply sparingly to affected area two times daily for 14 days. 60 g 3     insulin glargine (LANTUS) 100 UNIT/ML " injection Inject 20 Units Subcutaneous At Bedtime 3 mL 1     insulin pen needle (ULTICARE MINI) 31G X 6 MM Use daily or as directed. 100 each prn     lactobacillus rhamnosus, GG, (CULTURELL) capsule Take 1 capsule by mouth 2 times daily 60 capsule 0     miconazole (MICATIN; MICRO GUARD) 2 % powder Apply topically 2 times daily 71 g 0     MULTIVITAMIN OR 1 tablet by mouth daily       NOVOLOG FLEXPEN 100 UNIT/ML soln Inject 4 units SQ with breakfast, lunch and dinner. 15 mL 3     ORDER FOR DME Equipment being ordered: Compression stockings, 20-30 MMHG, knee high 1 Device 0     order for DME Equipment being ordered: TEDS stocking   Below the knee 15-20 mg  Dispense 2  Use daily 2 Device 1     order for DME 1 wheelchair 1 Device 0     order for DME Equipment being ordered: Right Lower extremity Solaris Ready wrap calf piece:  Size small/length tall , knee piece: Size small , Thigh piece size small/length average. 1 Units 0     order for DME 1 SAD light 1 Device 1     sertraline (ZOLOFT) 25 MG tablet Take 1 tablet (25 mg) by mouth daily 30 tablet 3     triamcinolone (KENALOG) 0.025 % ointment Apply to sites of dermatitis under the breasts, chest, buttocks - once a day. 80 g 1     Urea 20 % CREA cream Apply topically daily 85 g 3       Shayna Ramsey  4/27/2018

## 2018-05-02 LAB
DLCOCOR-%PRED-PRE: 100 %
DLCOCOR-PRE: 21.47 ML/MIN/MMHG
DLCOUNC-%PRED-PRE: 85 %
DLCOUNC-PRE: 18.27 ML/MIN/MMHG
DLCOUNC-PRED: 21.31 ML/MIN/MMHG
ERV-%PRED-PRE: 83 %
ERV-PRE: 0.46 L
ERV-PRED: 0.55 L
EXPTIME-PRE: 6.27 SEC
FEF2575-%PRED-PRE: 80 %
FEF2575-PRE: 1.58 L/SEC
FEF2575-PRED: 1.97 L/SEC
FEFMAX-%PRED-PRE: 87 %
FEFMAX-PRE: 5.2 L/SEC
FEFMAX-PRED: 5.97 L/SEC
FEV1-%PRED-PRE: 76 %
FEV1-PRE: 1.81 L
FEV1FEV6-PRE: 79 %
FEV1FEV6-PRED: 79 %
FEV1FVC-PRE: 80 %
FEV1FVC-PRED: 76 %
FEV1SVC-PRE: 78 %
FEV1SVC-PRED: 72 %
FIFMAX-PRE: 4.24 L/SEC
FRCPLETH-%PRED-PRE: 104 %
FRCPLETH-PRE: 2.93 L
FRCPLETH-PRED: 2.81 L
FVC-%PRED-PRE: 74 %
FVC-PRE: 2.27 L
FVC-PRED: 3.05 L
IC-%PRED-PRE: 67 %
IC-PRE: 1.86 L
IC-PRED: 2.75 L
MVV-%PRED-PRE: 102 %
MVV-PRE: 92 L/MIN
MVV-PRED: 90 L/MIN
RVPLETH-%PRED-PRE: 115 %
RVPLETH-PRE: 2.47 L
RVPLETH-PRED: 2.13 L
TLCPLETH-%PRED-PRE: 91 %
TLCPLETH-PRE: 4.8 L
TLCPLETH-PRED: 5.23 L
VA-%PRED-PRE: 70 %
VA-PRE: 3.78 L
VC-%PRED-PRE: 70 %
VC-PRE: 2.33 L
VC-PRED: 3.3 L

## 2018-05-03 ENCOUNTER — OFFICE VISIT (OUTPATIENT)
Dept: WOUND CARE | Facility: CLINIC | Age: 68
End: 2018-05-03
Payer: MEDICARE

## 2018-05-03 ENCOUNTER — INFUSION THERAPY VISIT (OUTPATIENT)
Dept: INFUSION THERAPY | Facility: CLINIC | Age: 68
End: 2018-05-03
Attending: INTERNAL MEDICINE
Payer: MEDICARE

## 2018-05-03 VITALS
HEART RATE: 62 BPM | DIASTOLIC BLOOD PRESSURE: 45 MMHG | RESPIRATION RATE: 16 BRPM | SYSTOLIC BLOOD PRESSURE: 168 MMHG | TEMPERATURE: 98.2 F

## 2018-05-03 DIAGNOSIS — E11.40 TYPE 2 DIABETES MELLITUS WITH DIABETIC NEUROPATHY, WITH LONG-TERM CURRENT USE OF INSULIN (H): ICD-10-CM

## 2018-05-03 DIAGNOSIS — N18.4 TYPE 2 DIABETES MELLITUS WITH STAGE 4 CHRONIC KIDNEY DISEASE, WITH LONG-TERM CURRENT USE OF INSULIN (H): Primary | ICD-10-CM

## 2018-05-03 DIAGNOSIS — D63.1 ANEMIA IN STAGE 5 CHRONIC KIDNEY DISEASE, NOT ON CHRONIC DIALYSIS (H): Primary | ICD-10-CM

## 2018-05-03 DIAGNOSIS — Z79.4 TYPE 2 DIABETES MELLITUS WITH STAGE 4 CHRONIC KIDNEY DISEASE, WITH LONG-TERM CURRENT USE OF INSULIN (H): Primary | ICD-10-CM

## 2018-05-03 DIAGNOSIS — N18.5 ANEMIA IN STAGE 5 CHRONIC KIDNEY DISEASE, NOT ON CHRONIC DIALYSIS (H): Primary | ICD-10-CM

## 2018-05-03 DIAGNOSIS — Z79.4 TYPE 2 DIABETES MELLITUS WITH DIABETIC NEUROPATHY, WITH LONG-TERM CURRENT USE OF INSULIN (H): ICD-10-CM

## 2018-05-03 DIAGNOSIS — E11.22 TYPE 2 DIABETES MELLITUS WITH STAGE 4 CHRONIC KIDNEY DISEASE, WITH LONG-TERM CURRENT USE OF INSULIN (H): Primary | ICD-10-CM

## 2018-05-03 PROCEDURE — 96365 THER/PROPH/DIAG IV INF INIT: CPT

## 2018-05-03 PROCEDURE — 25000128 H RX IP 250 OP 636: Mod: ZF | Performed by: INTERNAL MEDICINE

## 2018-05-03 RX ADMIN — FERRIC CARBOXYMALTOSE INJECTION 750 MG: 50 INJECTION, SOLUTION INTRAVENOUS at 15:14

## 2018-05-03 NOTE — MR AVS SNAPSHOT
After Visit Summary   5/3/2018    Supriya Herr    MRN: [id number]           Patient Information     Date Of Birth          1950        Visit Information        Provider Department      5/3/2018 3:00 PM JENNIFER 44 ATC;  SPEC INFUSION [facility] Specialty [facility] Procedure        Today's Diagnoses     Anemia in stage 5 chronic kidney disease, 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 (H)    -  1      Care Instructions    Dear Supriya Herr    Thank you for choosing [facility] ([facility]) for your infusion.  The following information is a summary of our appointment as well as important reminders.          We look forward in seeing you on your next appointment here at [facility].  Please don t hesitate to call us at [phone] to reschedule any of your appointments or to speak with one of the [facility] registered nurses.  It was a pleasure taking care of you today.    Sincerely,    [facility]  Specialty Infusion & Procedure Center  [address] [place]  [address]  Phone:  [phone]            Follow-ups after your visit        Your next 10 appointments already scheduled     May 11, 2018 11:30 AM CDT   LAB with  LAB   [facility] ([facility])    [address] [address] [phone]           Please do not eat 10-12 hours before your appointment if you are coming in fasting for labs on lipids, cholesterol, or glucose (sugar). This does not apply to pregnant women. Water, hot tea and black coffee (with nothing added) are okay. Do not drink other fluids, diet soda or chew gum.            May 18, 2018 11:00 AM CDT   Nurse Only with RD FP MA/LPN   [facility]    [address] [place] [address] [phone]            May 21, 2018  9:00 AM CDT   NM INJECTION with UUNMINJ1    [facility], Nuclear Medicine ([facility])    [address] [facility] [address]   [phone]            May 21, 2018  9:45 AM CDT   NM SCAN3 with UUNM1   [facility], Nuclear Medicine ([facility])    [address] [facility] [address]   [phone]            May 21, 2018 10:15 AM CDT   Ekg Stress Nm Lexiscan with UUEKGNMS   [facility] ELECTROCARDIOLOGY ([facility])    [address] [facility] [address]               May 21, 2018 11:15 AM CDT   NM MPI WITH LEXISCAN with UUNM1   [facility] Nuclear Medicine ([facility])    [address] [facility] [address]   [phone]           For a ONE day exam: Allow 3-4 hours for test. For a TWO day exam: Allow  minutes PER day for test.  On the day of your resting scan: Please stop eating 3 hours before the test. You may drink water or juice.  On the day of your stress test: Stop all caffeine 12 hours before the test. This includes coffee, tea, soda pop, chocolate and certain medicines (such as Anacin, Excedrin and NoDoz). Also avoid decaf coffee and tea, as these contain small amounts of caffeine.  Stop eating 3 hours before the test. You may drink water.  You may need to stop some medicines before the test. Follow your doctor s orders. - If you take a beta blocker: Do not take your beta-blocker on the day before your test, unless specifically told to by your doctor. And do not take it on the day of your test. Bring it with you to take after the test. - If you take Aggrenox or dipyridamole (Persantine, Permole), stop taking it 48 hours before your test. - If you take Viagra, Cialis or Levitra, stop taking it 48 hours before your test. - If you take theophylline or aminophylline, stop taking it 12 hours before your test.  Do not take nitrates  on the day of your test. Do not wear your Nitro-Patch.  Please wear a loose two-piece outfit. If you will have an exercise test, bring rubber-soled walking shoes.  When you arrive, please tell us if you have a pacemaker or ICD (implantable defibrillator).  Please call your Imaging Department at your exam site with any questions.            Jun 04, 2018  4:00 PM CDT   Lab with  LAB   [facility] Lab ([facility])    [address] [facility]  1st Floor  [facility] [id number] [phone]            Jun 04, 2018  5:00 PM CDT   (Arrive by 4:30 PM)   Return Visit with Sara Martin PA-C   [facility] Nephrology ([facility])    [address]  [address]  [facility] [id number] [phone]            Jun 13, 2018   Procedure with Barry Morel MD   [facility] [place] Endoscopy ([facility])    [address] [facility] [id number] [phone]           [facility] is located on the corner of [address] and [address] on the [facility]. It is easily accessible from virtually any point in the [place] area, via [address] and [address].            Jun 22, 2018 11:00 AM CDT   (Arrive by 10:45 AM)   New Patient Visit with Sandy Morse MD   [facility] Dermatology ([facility])    [address] [place] [address]  3rd Floor  [facility] [id number] [phone]              Who to contact     If you have questions or need follow up information about today's clinic visit or your schedule please contact [facility] SPECIAL[facility]ED[facility] directly at [phone].  Normal or non-critical lab and imaging results will be communicated to you by MyChart, letter or phone within 4 business days after the clinic has received the results. If you do not hear from us within 7 days, please contact the clinic through MyChart or phone. If you have a  "critical or abnormal lab result, we will notify you by phone as soon as possible.  Submit refill requests through [employer] or call your pharmacy and they will forward the refill request to us. Please allow 3 business days for your refill to be completed.          Additional Information About Your Visit        [employer] Information     [employer] lets you send messages to your doctor, view your test results, renew your prescriptions, schedule appointments and more. To sign up, go to [email or web address] . Click on \"Log in\" on the left side of the screen, which will take you to the Welcome page. Then click on \"Sign up Now\" on the right side of the page.     You will be asked to enter the access code listed below, as well as some personal information. Please follow the directions to create your username and password.     Your access code is: [email or web address]  Expires: 2018  4:04 PM     Your access code will  in 90 days. If you need help or a new code, please call your [facility] clinic or [phone].        Care EveryWhere ID     This is your Care EveryWhere ID. This could be used by other organizations to access your [facility] medical records  [id number]        Your Vitals Were     Pulse Temperature Respirations             61 98.2  F (36.8  C) (Oral) 16          Blood Pressure from Last 3 Encounters:   18 168/49   18 159/48   18 172/71    Weight from Last 3 Encounters:   18 88.5 kg (195 lb)   18 88.7 kg (195 lb 9.6 oz)   18 85.7 kg (189 lb)              Today, you had the following     No orders found for display       Primary Care Provider Office Phone # Fax #    Noam Jackson [phone]       [address] [place] [address]        Equal Access to Services     KALYANI WARREN : Emeterio Lam, wabo crocker, qaybta madeline washburn . [facility] [phone].    ATENCIÓN: Si carolina " español, tiene a santoro disposición servicios gratuitos de asistencia lingüística. Renuka chua [phone].    We comply with applicable federal civil rights laws and [place] laws. We do not discriminate on the basis of race, color, national origin, age, disability, sex, sexual orientation, or gender identity.            Thank you!     Thank you for choosing [facility] SPECIALTY AND PROCEDURE  for your care. Our goal is always to provide you with excellent care. Hearing back from our patients is one way we can continue to improve our services. Please take a few minutes to complete the written survey that you may receive in the mail after your visit with us. Thank you!             Your Updated Medication List - Protect others around you: Learn how to safely use, store and throw away your medicines at www.disposemymeds.org.          This list is accurate as of 5/3/18  4:03 PM.  Always use your most recent med list.                   Brand Name Dispense Instructions for use Diagnosis    ACE/ARB/ARNI NOT PRESCRIBED (INTENTIONAL)      Please choose reason not prescribed, below    CKD (chronic kidney disease) stage 5, GFR less than 15 ml/min (H)       albuterol 108 (90 Base) MCG/ACT Inhaler    PROAIR HFA/PROVENTIL HFA/VENTOLIN HFA    3 Inhaler    Inhale 2 puffs into the lungs every 6 hours as needed for shortness of breath / dyspnea or wheezing    Cough       ASPIRIN NOT PRESCRIBED    INTENTIONAL    0 each    1 each continuous prn Antiplatelet medication not prescribed intentionally due to current nosebleeds    Anemia due to blood loss, acute       atorvastatin 20 MG tablet    LIPITOR    90 tablet    Take 1 tablet (20 mg) by mouth daily    Hyperlipidemia LDL goal <100       * blood glucose monitoring test strip    ONETOUCH ULTRA    200 strip    Use to test blood sugars 2 times daily or as directed.    Type 2 diabetes mellitus with stage 3 chronic kidney disease, without long-term current use of insulin  (H)       * blood glucose monitoring test strip    ONETOUCH ULTRA    200 strip    Test your blood sugar 3-4 times per day.    Type 2 diabetes mellitus with hyperglycemia, with long-term current use of insulin (H)       CALCITRIOL PO      Take by mouth daily Unsure of dosage        carvedilol 12.5 MG tablet    COREG    60 tablet    Take 1 tablet (12.5 mg) by mouth 2 times daily (with meals)    Essential hypertension with goal blood pressure less than 140/90       clotrimazole 1 % cream    LOTRIMIN    60 g    Apply topically 2 times daily    Intertrigo       ferrous sulfate 325 (65 Fe) MG tablet    IRON    180 tablet    Take 1 tablet (325 mg) by mouth 2 times daily    Iron deficiency       * furosemide 40 MG tablet    LASIX    30 tablet    Take 1 tablet (40 mg) by mouth daily    Lymphedema of both lower extremities       * furosemide 20 MG tablet    LASIX    30 tablet    Take 1 tablet (20 mg) by mouth At Bedtime    Lymphedema of both lower extremities, Essential hypertension with goal blood pressure less than 140/90       hydrocortisone 1 % cream    CORTAID    60 g    Apply sparingly to affected area two times daily for 14 days.    Intertrigo       insulin glargine 100 UNIT/ML injection    LANTUS    3 mL    Inject 20 Units Subcutaneous At Bedtime    Type 2 diabetes mellitus with diabetic neuropathy, with long-term current use of insulin (H)       insulin pen needle 31G X 6 MM    ULTICARE MINI    100 each    Use daily or as directed.    Type 2 diabetes, HbA1c goal < 7% (H)       lactobacillus rhamnosus (GG) capsule     60 capsule    Take 1 capsule by mouth 2 times daily    Cellulitis of right leg       miconazole 2 % powder    MICATIN; MICRO GUARD    71 g    Apply topically 2 times daily    Fungal dermatitis       MULTIVITAMIN PO      1 tablet by mouth daily        NovoLOG FLEXPEN 100 UNIT/ML injection   Generic drug:  insulin aspart     15 mL    Inject 4 units SQ with breakfast, lunch and dinner.    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    Edema of lower extremity       order for DME     1 Device    1 SAD light    Seasonal affective disorder (H)       sertraline 25 MG tablet    ZOLOFT    30 tablet    Take 1 tablet (25 mg) by mouth daily    NICOLE (generalized anxiety disorder)       triamcinolone 0.025 % ointment    KENALOG    80 g    Apply to sites of dermatitis under the breasts, chest, buttocks - once a day.    Dermatitis       Urea 20 % Crea cream     85 g    Apply topically daily    Xerosis cutis, Tyloma       * Notice:  This list has 7 medication(s) that are the same as other medications prescribed for you. Read the directions carefully, and ask your doctor or other care provider to review them with you.

## 2018-05-03 NOTE — LETTER
5/3/2018       RE: Supriya Herr  [address]  [facility] [address]     Dear Colleague,    Thank you for referring your patient, Supriya Herr, to the [facility] WOUND CARE at [facility]. Please see a copy of my visit note below.    Chief Complaint:   Chief Complaint   Patient presents with     WOUND CARE     wound care and check right foot          Allergies   Allergen Reactions     Nkda [No Known Drug Allergies]          Subjective: Supriya is a 68 year old female who presents to the clinic today for a follow up of right foot wound. She presents today with her sister. She relates that she has kept the area dressed daily. She initially did have some pain, however this has gone away. She is going to [place] tomorrow for vacation.     Objective  Data Unavailable Data Unavailable Data Unavailable Data Unavailable Data Unavailable 0 lbs 0 oz  At the area of the previous ulceration, this has completely healed today. No surrounding erythema, edema, or open lesions.     Assessment: Healed right foot wound.    Plan:   - Pt seen and evaluated  - No further dressing needed today.   - Wear socks and shoes at all time.   - Pt to return to clinic in 3 months.       Again, thank you for allowing me to participate in the care of your patient.      Sincerely,    Eleazar Pack DPM

## 2018-05-03 NOTE — MR AVS SNAPSHOT
After Visit Summary   5/3/2018    Supriya Herr    MRN: [id number]           Patient Information     Date Of Birth          1950        Visit Information        Provider Department      5/3/2018 2:00 PM Eleazar Pack DPM [facility]        Today's Diagnoses     Type 2 diabetes mellitus with stage 4 chronic kidney disease, with long-term current use of insulin (H)    -  1    Type 2 diabetes mellitus with diabetic neuropathy, with long-term current use of insulin (H)           Follow-ups after your visit        Your next 10 appointments already scheduled     May 03, 2018  3:00 PM CDT   Infusion 120 with UC SPEC INFUSION, UC 45 ATC   [facility] ([facility])    [address]  [address]  [facility] [id number] [phone]            May 11, 2018 11:30 AM CDT   LAB with UC LAB   [facility] ([facility])    [address]  1st Floor  [place] [facility] [id number] [phone]           Please do not eat 10-12 hours before your appointment if you are coming in fasting for labs on lipids, cholesterol, or glucose (sugar). This does not apply to pregnant women. Water, hot tea and black coffee (with nothing added) are okay. Do not drink other fluids, diet soda or chew gum.            May 18, 2018 11:00 AM CDT   Nurse Only with LISANDRO FRANCIS MA/LPN   [facility]    [address] [place] [address]  [address]  [facility] [id number] [phone]            May 21, 2018  9:00 AM CDT   NM INJECTION with UUNMINJ1   [facility], Nuclear Medicine ([facility])    [address] [address]   [phone]            May 21, 2018  9:45 AM CDT   NM SCAN3 with UUNM1   [facility], Nuclear Medicine ([facility])    [address] [place] [address]  [facility] [address]   [phone]            May 21, 2018 10:15 AM CDT   Ekg Stress Nm Lexiscan with UUEKGNMS   [facility] ([facility])    [address] [address]               May 21, 2018 11:15 AM CDT   NM MPI WITH LEXISCAN with UUNM1   [facility] [place], Nuclear Medicine ([facility])    [address] [facility] [address] [phone]           For a ONE day exam: Allow 3-4 hours for test. For a TWO day exam: Allow  minutes PER day for test.  On the day of your resting scan: Please stop eating 3 hours before the test. You may drink water or juice.  On the day of your stress test: Stop all caffeine 12 hours before the test. This includes coffee, tea, soda pop, chocolate and certain medicines (such as Anacin, Excedrin and NoDoz). Also avoid decaf coffee and tea, as these contain small amounts of caffeine.  Stop eating 3 hours before the test. You may drink water.  You may need to stop some medicines before the test. Follow your doctor s orders. - If you take a beta blocker: Do not take your beta-blocker on the day before your test, unless specifically told to by your doctor. And do not take it on the day of your test. Bring it with you to take after the test. - If you take Aggrenox or dipyridamole (Persantine, Permole), stop taking it 48 hours before your test. - If you take Viagra, Cialis or Levitra, stop taking it 48 hours before your test. - If you take theophylline or aminophylline, stop taking it 12 hours before your test.  Do not take nitrates on the day of your test. Do not wear your Nitro-Patch.  Please wear a loose two-piece outfit. If you will have an exercise test, bring rubber-soled walking shoes.  When you arrive, please tell us if you have a pacemaker or ICD (implantable defibrillator).  Please call your Imaging Department at your exam site with any  questions.            2018  4:00 PM CDT   Lab with  LAB   [facility] ([facility])    [address]  1st Floor  [facility] [address] [phone]            2018  5:00 PM CDT   (Arrive by 4:30 PM)   Return Visit with Sara Martin PA-C   [facility] Nephrology ([facility])    [address]  [address]  [facility] [address] [phone]              Who to contact     Please call your clinic at [phone] to:    Ask questions about your health    Make or cancel appointments    Discuss your medicines    Learn about your test results    Speak to your doctor            Additional Information About Your Visit        MyChart Information     [employer] is an electronic gateway that provides easy, online access to your medical records. With [employer], you can request a clinic appointment, read your test results, renew a prescription or communicate with your care team.     To sign up for [employer] visit the website at [email or web address]   You will be asked to enter the access code listed below, as well as some personal information. Please follow the directions to create your username and password.     Your access code is: [email or web address]  Expires: 2018  4:04 PM     Your access code will  in 90 days. If you need help or a new code, please contact your [facility] or call [phone] for assistance.        Care EveryWhere ID     This is your Care EveryWhere ID. This could be used by other organizations to access your [facility] medical records  [id number]         Blood Pressure from Last 3 Encounters:   18 159/48   18 172/71   18 136/48    Weight from Last 3 Encounters:   18 195 lb   18 195 lb 9.6 oz   18 189 lb              Today, you had the following     No orders found for display       Primary Care Provider Office Phone # Fax #    Noam Smith  MD Renetta [phone]       [address] [facility] [address]  [facility] [address]        Equal Access to Services     KALYANI WARREN : Hadii aad ku hadmicaeladebbie Odettealeksandar, kristin curtissulmaha, karina parisi, madeline gusin hayaaheather landrytere marin indigo chaudhari. So [facility] [phone].    ATENCIÓN: Si habla español, tiene a santoro disposición servicios gratuitos de asistencia lingüística. Llame al [phone].    We comply with applicable federal civil rights laws and [place] laws. We do not discriminate on the basis of race, color, national origin, age, disability, sex, sexual orientation, or gender identity.            Thank you!     Thank you for choosing [facility]  for your care. Our goal is always to provide you with excellent care. Hearing back from our patients is one way we can continue to improve our services. Please take a few minutes to complete the written survey that you may receive in the mail after your visit with us. Thank you!             Your Updated Medication List - Protect others around you: Learn how to safely use, store and throw away your medicines at www.disposemymeds.org.          This list is accurate as of 5/3/18  2:02 PM.  Always use your most recent med list.                   Brand Name Dispense Instructions for use Diagnosis    ACE/ARB/ARNI NOT PRESCRIBED (INTENTIONAL)      Please choose reason not prescribed, below    CKD (chronic kidney disease) stage 5, GFR less than 15 ml/min (H)       albuterol 108 (90 Base) MCG/ACT Inhaler    PROAIR HFA/PROVENTIL HFA/VENTOLIN HFA    3 Inhaler    Inhale 2 puffs into the lungs every 6 hours as needed for shortness of breath / dyspnea or wheezing    Cough       ASPIRIN NOT PRESCRIBED    INTENTIONAL    0 each    1 each continuous prn Antiplatelet medication not prescribed intentionally due to current nosebleeds    Anemia due to blood loss, acute       atorvastatin 20 MG tablet    LIPITOR    90 tablet    Take 1 tablet (20 mg) by mouth daily     Hyperlipidemia LDL goal <100       * blood glucose monitoring test strip    ONETOUCH ULTRA    200 strip    Use to test blood sugars 2 times daily or as directed.    Type 2 diabetes mellitus with stage 3 chronic kidney disease, without long-term current use of insulin (H)       * blood glucose monitoring test strip    ONETOUCH ULTRA    200 strip    Test your blood sugar 3-4 times per day.    Type 2 diabetes mellitus with hyperglycemia, with long-term current use of insulin (H)       CALCITRIOL PO      Take by mouth daily Unsure of dosage        carvedilol 12.5 MG tablet    COREG    60 tablet    Take 1 tablet (12.5 mg) by mouth 2 times daily (with meals)    Essential hypertension with goal blood pressure less than 140/90       clotrimazole 1 % cream    LOTRIMIN    60 g    Apply topically 2 times daily    Intertrigo       ferrous sulfate 325 (65 Fe) MG tablet    IRON    180 tablet    Take 1 tablet (325 mg) by mouth 2 times daily    Iron deficiency       * furosemide 40 MG tablet    LASIX    30 tablet    Take 1 tablet (40 mg) by mouth daily    Lymphedema of both lower extremities       * furosemide 20 MG tablet    LASIX    30 tablet    Take 1 tablet (20 mg) by mouth At Bedtime    Lymphedema of both lower extremities, Essential hypertension with goal blood pressure less than 140/90       hydrocortisone 1 % cream    CORTAID    60 g    Apply sparingly to affected area two times daily for 14 days.    Intertrigo       insulin glargine 100 UNIT/ML injection    LANTUS    3 mL    Inject 20 Units Subcutaneous At Bedtime    Type 2 diabetes mellitus with diabetic neuropathy, with long-term current use of insulin (H)       insulin pen needle 31G X 6 MM    ULTICARE MINI    100 each    Use daily or as directed.    Type 2 diabetes, HbA1c goal < 7% (H)       lactobacillus rhamnosus (GG) capsule     60 capsule    Take 1 capsule by mouth 2 times daily    Cellulitis of right leg       miconazole 2 % powder    MICATIN; MICRO GUARD    71 g     Apply topically 2 times daily    Fungal dermatitis       MULTIVITAMIN PO      1 tablet by mouth daily        NovoLOG FLEXPEN 100 UNIT/ML injection   Generic drug:  insulin aspart     15 mL    Inject 4 units SQ with breakfast, lunch and dinner.    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    Edema of lower extremity       order for DME     1 Device    1 SAD light    Seasonal affective disorder (H)       sertraline 25 MG tablet    ZOLOFT    30 tablet    Take 1 tablet (25 mg) by mouth daily    NICOLE (generalized anxiety disorder)       triamcinolone 0.025 % ointment    KENALOG    80 g    Apply to sites of dermatitis under the breasts, chest, buttocks - once a day.    Dermatitis       Urea 20 % Crea cream     85 g    Apply topically daily    Xerosis cutis, Tyloma       * Notice:  This list has 7 medication(s) that are the same as other medications prescribed for you. Read the directions carefully, and ask your doctor or other care provider to review them with you.

## 2018-05-03 NOTE — PROGRESS NOTES
Nursing Note  [facility]:   Chief Complaint   Patient presents with     Infusion     Injectafer infusion     During [facility]'s [facility] appointment, orders from Dr. Kathryn Basilio were completed.  Frequency: today is dose 2 of 2 total.    Progress note:  Patient identification verified by name and date of birth.  Assessment completed.  Vitals recorded in Doc Flowsheets.  Patient was provided with education regarding infusion and possible side effects.  Patient verbalized understanding.      needed: No  Premedications: were not ordered.  Infusion Rates: infusion given over approximately 15 minutes.  Labs: were not ordered for this appointment.  Vascular access: peripheral IV placed today.  Treatment Conditions: non-applicable.  Patient tolerated infusion: well.        Discharge Plan:   Follow up plan of care with: primary medical doctor.  Discharge instructions were reviewed with patient.  Patient/representative verbalized understanding of discharge instructions and all questions answered.  [facility] discharged [facility] in stable condition.    Vandana Rapp RN    Administrations This Visit     ferric carboxymaltose (INJECTAFER) 750 mg in sodium chloride 0.9 % 100 mL intermittent infusion     Admin Date Action Dose Route Administered By             05/03/2018 New Bag 750 mg Intravenous Vandana Rapp RN                          /49  Pulse 61  Temp 98.2  F (36.8  C) (Oral)  Resp 16

## 2018-05-03 NOTE — PROGRESS NOTES
Chief Complaint:   Chief Complaint   Patient presents with     WOUND CARE     wound care and check right foot          Allergies   Allergen Reactions     Nkda [No Known Drug Allergies]          Subjective: Supriya is a 68 year old female who presents to the clinic today for a follow up of right foot wound. She presents today with her sister. She relates that she has kept the area dressed daily. She initially did have some pain, however this has gone away. She is going to [place] tomorrow for vacation.     Objective  Data Unavailable Data Unavailable Data Unavailable Data Unavailable Data Unavailable 0 lbs 0 oz  At the area of the previous ulceration, this has completely healed today. No surrounding erythema, edema, or open lesions.     Assessment: Healed right foot wound.    Plan:   - Pt seen and evaluated  - No further dressing needed today.   - Wear socks and shoes at all time.   - Pt to return to clinic in 3 months.

## 2018-05-03 NOTE — PATIENT INSTRUCTIONS
Dear Supriya Herr    Thank you for choosing [facility] ([facility]) for your infusion.  The following information is a summary of our appointment as well as important reminders.          We look forward in seeing you on your next appointment here at [facility].  Please don t hesitate to call us at [phone] to reschedule any of your appointments or to speak with one of the [facility] registered nurses.  It was a pleasure taking care of you today.    Sincerely,    [facility]  [facility]  [address] [place] [address]  [address]  Phone:  [phone]

## 2018-05-09 DIAGNOSIS — N18.5 CKD (CHRONIC KIDNEY DISEASE) STAGE 5, GFR LESS THAN 15 ML/MIN (H): Primary | ICD-10-CM

## 2018-05-11 DIAGNOSIS — N18.5 CKD (CHRONIC KIDNEY DISEASE) STAGE 5, GFR LESS THAN 15 ML/MIN (H): ICD-10-CM

## 2018-05-11 LAB
ALBUMIN SERPL-MCNC: 2.6 G/DL (ref 3.4–5)
ANION GAP SERPL CALCULATED.3IONS-SCNC: 7 MMOL/L (ref 3–14)
BUN SERPL-MCNC: 67 MG/DL (ref 7–30)
CALCIUM SERPL-MCNC: 8.7 MG/DL (ref 8.5–10.1)
CHLORIDE SERPL-SCNC: 114 MMOL/L (ref 94–109)
CO2 SERPL-SCNC: 26 MMOL/L (ref 20–32)
CREAT SERPL-MCNC: 3.32 MG/DL (ref 0.52–1.04)
GFR SERPL CREATININE-BSD FRML MDRD: 14 ML/MIN/1.7M2
GLUCOSE SERPL-MCNC: 94 MG/DL (ref 70–99)
HGB BLD-MCNC: 9.4 G/DL (ref 11.7–15.7)
PHOSPHATE SERPL-MCNC: 4.4 MG/DL (ref 2.5–4.5)
POTASSIUM SERPL-SCNC: 4 MMOL/L (ref 3.4–5.3)
SODIUM SERPL-SCNC: 146 MMOL/L (ref 133–144)

## 2018-05-14 ENCOUNTER — TELEPHONE (OUTPATIENT)
Dept: FAMILY MEDICINE | Facility: CLINIC | Age: 68
End: 2018-05-14

## 2018-05-14 NOTE — TELEPHONE ENCOUNTER
Dr. Jackson,    Please review form placed on your desk for special diet statement. Please sign.    Called and spoke to Ethel Chloe from [facility] [place]: [phone].     In process of faxing form for provider. They are a part of the federal food gabby program in which they are required to serve certain foods. Patient is on a special diet, and unable to eat certain of the foods they serve.    Per Ethel, she has filled out as much of the form as she can, but would like it to be reviewed by provider to make sure all of the information is correct, and then have the document faxed back to them    Fax number [phone]-- states that their fax number should be on cover sheet.    Ana Luisa Garcia RN  [facility]

## 2018-05-14 NOTE — TELEPHONE ENCOUNTER
Reason for call:  Other   Patient called regarding (reason for call): call back  Additional comments: Ethel from [facility] will be faxing over some paperwork that Dr. Jackson will need to fill out for the patient, and she would like a nurse to call her back so that she can explain what exactly they're looking for to put on the forms.     Phone number to reach patient:  Other phone number:  [phone]    Best Time:  Any    Can we leave a detailed message on this number?  Not Applicable

## 2018-05-18 ENCOUNTER — NURSE TRIAGE (OUTPATIENT)
Dept: NURSING | Facility: CLINIC | Age: 68
End: 2018-05-18

## 2018-05-18 ENCOUNTER — ALLIED HEALTH/NURSE VISIT (OUTPATIENT)
Dept: NURSING | Facility: CLINIC | Age: 68
End: 2018-05-18
Payer: MEDICARE

## 2018-05-18 ENCOUNTER — HOSPITAL ENCOUNTER (INPATIENT)
Facility: CLINIC | Age: 68
LOS: 2 days | Discharge: HOME | DRG: 603 | End: 2018-05-21
Attending: EMERGENCY MEDICINE | Admitting: INTERNAL MEDICINE
Payer: MEDICARE

## 2018-05-18 DIAGNOSIS — E78.5 HYPERLIPIDEMIA LDL GOAL <100: Primary | ICD-10-CM

## 2018-05-18 DIAGNOSIS — Z23 NEED FOR VACCINATION: Primary | ICD-10-CM

## 2018-05-18 DIAGNOSIS — N18.5 CKD (CHRONIC KIDNEY DISEASE) STAGE 5, GFR LESS THAN 15 ML/MIN (H): ICD-10-CM

## 2018-05-18 DIAGNOSIS — Z87.891 FORMER TOBACCO USE: ICD-10-CM

## 2018-05-18 DIAGNOSIS — L03.115 CELLULITIS OF FOOT, RIGHT: ICD-10-CM

## 2018-05-18 PROCEDURE — 99285 EMERGENCY DEPT VISIT HI MDM: CPT | Mod: Z6 | Performed by: EMERGENCY MEDICINE

## 2018-05-18 PROCEDURE — 96367 TX/PROPH/DG ADDL SEQ IV INF: CPT

## 2018-05-18 PROCEDURE — G0010 ADMIN HEPATITIS B VACCINE: HCPCS

## 2018-05-18 PROCEDURE — 96365 THER/PROPH/DIAG IV INF INIT: CPT

## 2018-05-18 PROCEDURE — 99207 ZZC NO CHARGE NURSE ONLY: CPT

## 2018-05-18 PROCEDURE — 90746 HEPB VACCINE 3 DOSE ADULT IM: CPT

## 2018-05-18 PROCEDURE — 99285 EMERGENCY DEPT VISIT HI MDM: CPT | Mod: 25

## 2018-05-18 NOTE — IP AVS SNAPSHOT
MRN:[id number]                      After Visit Summary   5/18/2018    Supriya Herr    MRN: [id number]           Thank you!     Thank you for choosing [facility] for your care. Our goal is always to provide you with excellent care. Hearing back from our patients is one way we can continue to improve our services. Please take a few minutes to complete the written survey that you may receive in the mail after you visit with us. Thank you!        Patient Information     Date Of Birth          1950        Designated Caregiver       Most Recent Value    Caregiver    Will someone help with your care after discharge? yes    Name of designated caregiver Rios    Phone number of caregiver [phone]    Caregiver address [place]      About your hospital stay     You were admitted on:  May 19, 2018 You last received care in the:  [facility] 8A    You were discharged on:  May 21, 2018        Reason for your hospital stay       Cellulitis of rt.foot                  Who to Call     For medical emergencies, please call 911.  For non-urgent questions about your medical care, please call your primary care provider or clinic, [phone]          Attending Provider     Provider Specialty    Arcelia Fine MD Emergency Medicine    [facility]       Primary Care Provider Office Phone # Fax #    Noam Jackson [phone]      After Care Instructions     Activity       Your activity upon discharge: activity as tolerated            Diet       Follow this diet upon discharge: Moderate consistent carbohydrate (2670-0621 joss / 4-6 CHO units per meal)                  Follow-up Appointments     Adult [facility] Follow-up and recommended labs and tests       Follow with Podiatry on Thursday 5/24/18     Appointments on [place] and/or [facility] (with [facility] or [facility] provider or service). Call [phone] if you haven't heard regarding these appointments within 7  days of discharge.                  Your next 10 appointments already scheduled     May 22, 2018  9:00 AM CDT   NM INJECTION with UUNMINJ2   [facility], Nuclear Medicine ([facility])    [address] [id number] [phone]            May 22, 2018  9:45 AM CDT   NM SCAN3 with UUNM1   [facility], Nuclear Medicine ([facility])    [address] [id number] [phone]            May 22, 2018 10:15 AM CDT   Ekg Stress Nm Lexiscan with UUEKGNMS   [facility] ELECTROCARDIOLOGY ([facility])    [address] [address]               May 22, 2018 11:15 AM CDT   NM MPI WITH LEXISCAN with UUNM1   [facility], Nuclear Medicine ([facility])    [address] [id number] [phone]           For a ONE day exam: Allow 3-4 hours for test. For a TWO day exam: Allow  minutes PER day for test.  On the day of your resting scan: Please stop eating 3 hours before the test. You may drink water or juice.  On the day of your stress test: Stop all caffeine 12 hours before the test. This includes coffee, tea, soda pop, chocolate and certain medicines (such as Anacin, Excedrin and NoDoz). Also avoid decaf coffee and tea, as these contain small amounts of caffeine.  Stop eating 3 hours before the test. You may drink water.  You may need to stop some medicines before the test. Follow your doctor s orders. - If you take a beta blocker: Do not take your beta-blocker on the day before your test, unless specifically told to by your doctor. And do not take it on the day of your test. Bring it with you to take after the test. - If you take Aggrenox or dipyridamole (Persantine, Permole), stop taking it 48 hours before your test. - If you take Viagra, Cialis or Levitra, stop taking  it 48 hours before your test. - If you take theophylline or aminophylline, stop taking it 12 hours before your test.  Do not take nitrates on the day of your test. Do not wear your Nitro-Patch.  Please wear a loose two-piece outfit. If you will have an exercise test, bring rubber-soled walking shoes.  When you arrive, please tell us if you have a pacemaker or ICD (implantable defibrillator).  Please call your Imaging Department at your exam site with any questions.            May 24, 2018  4:30 PM CDT   (Arrive by 4:15 PM)   Return Visit with Eleazar Pack DPM   [facility] Wound Care ([facility])    [address]  4th Floor  [facility] [address] [phone]            Jun 04, 2018  4:00 PM CDT   Lab with JENNIFER LAB   [facility] Lab ([facility])    [address]  1st Floor  [facility] [address]   [phone]            Jun 04, 2018  5:00 PM CDT   (Arrive by 4:30 PM)   Return Visit with Sara Martin PA-C   [facility] Nephrology ([facility])    [address]  [address]  [facility] [address] [phone]            Jun 13, 2018   Procedure with Barry Morel MD   [facility] [place], Endoscopy ([facility], [facility])    [address] [address]   [phone]           [facility] is located on the corner of [address] and [address] on the [address] [facility]. It is easily accessible from virtually any point in the [place] area, via [address] and [address].            Jun 22, 2018 11:00 AM CDT   (Arrive by 10:45 AM)   New Patient Visit with Sandy Morse MD   [facility] Dermatology ([facility])    [address]  [address] [facility] [address] [phone]              Pending Results     Date and Time Order Name Status Description    5/19/2018 0018 Wound Culture Aerobic Bacterial  "Preliminary     2018 0018 Blood culture Preliminary     2018 0018 Blood culture Preliminary             Statement of Approval     Ordered          18 1141  I have reviewed and agree with all the recommendations and orders detailed in this document.  EFFECTIVE NOW     Approved and electronically signed by:  Da Justin MD             Admission Information     Date & Time Provider Department Dept. Phone    2018 Da Justin MD  8A [phone]      Your Vitals Were     Blood Pressure Pulse Temperature Respirations Weight Pulse Oximetry    157/55 (BP Location: Right arm) 58 96.9  F (36.1  C) (Oral) 18 92.6 kg (204 lb 3.2 oz) 97%    BMI (Body Mass Index)                   33.98 kg/m2           [facility] Information     [facility] lets you send messages to your doctor, view your test results, renew your prescriptions, schedule appointments and more. To sign up, go to [email or web address] . Click on \"Log in\" on the left side of the screen, which will take you to the Welcome page. Then click on \"Sign up Now\" on the right side of the page.     You will be asked to enter the access code listed below, as well as some personal information. Please follow the directions to create your username and password.     Your access code is: [email or web address]  Expires: 2018  4:04 PM     Your access code will  in 90 days. If you need help or a new code, please call your [facility] clinic or [phone].        Care EveryWhere ID     This is your Care EveryWhere ID. This could be used by other organizations to access your [facility] medical records  [id number]        Equal Access to Services     [facility] : Hadii danisha Lam, wabo crocker, qamadeline boston. So [facility] [phone].    ATENCIÓN: Si habla español, tiene a santoro disposición servicios gratuitos de asistencia lingüística. Llame al [phone].    We comply with applicable federal " civil rights laws and [place] laws. We do not discriminate on the basis of race, color, national origin, age, disability, sex, sexual orientation, or gender identity.               Review of your medicines      START taking        Dose / Directions    amoxicillin-clavulanate 500-125 MG per tablet   Commonly known as:  AUGMENTIN   Indication:  Infection of the Skin and/or Related Soft Tissue   Used for:  Cellulitis of foot, right        Dose:  1 tablet   Take 1 tablet by mouth 2 times daily   Quantity:  14 tablet   Refills:  0       atorvastatin 10 MG tablet   Commonly known as:  LIPITOR   Used for:  Hyperlipidemia LDL goal <100        Dose:  10 mg   Take 1 tablet (10 mg) by mouth daily   Quantity:  30 tablet   Refills:  0         CONTINUE these medicines which have NOT CHANGED        Dose / Directions    ACE/ARB/ARNI NOT PRESCRIBED (INTENTIONAL)   Used for:  CKD (chronic kidney disease) stage 5, GFR less than 15 ml/min (H)        Please choose reason not prescribed, below   Refills:  0       albuterol 108 (90 Base) MCG/ACT Inhaler   Commonly known as:  PROAIR HFA/PROVENTIL HFA/VENTOLIN HFA   Used for:  Cough        Dose:  2 puff   Inhale 2 puffs into the lungs every 6 hours as needed for shortness of breath / dyspnea or wheezing   Quantity:  3 Inhaler   Refills:  1       * blood glucose monitoring test strip   Commonly known as:  ONETOUCH ULTRA   Used for:  Type 2 diabetes mellitus with stage 3 chronic kidney disease, without long-term current use of insulin (H)        Use to test blood sugars 2 times daily or as directed.   Quantity:  200 strip   Refills:  3       * blood glucose monitoring test strip   Commonly known as:  ONETOUCH ULTRA   Used for:  Type 2 diabetes mellitus with hyperglycemia, with long-term current use of insulin (H)        Test your blood sugar 3-4 times per day.   Quantity:  200 strip   Refills:  3       CALCITRIOL PO        Take by mouth daily Unsure of dosage   Refills:  0       carvedilol  12.5 MG tablet   Commonly known as:  COREG   Used for:  Essential hypertension with goal blood pressure less than 140/90        Dose:  12.5 mg   Take 1 tablet (12.5 mg) by mouth 2 times daily (with meals)   Quantity:  60 tablet   Refills:  11       * furosemide 40 MG tablet   Commonly known as:  LASIX   Used for:  Lymphedema of both lower extremities        Dose:  40 mg   Take 1 tablet (40 mg) by mouth daily   Quantity:  30 tablet   Refills:  11       * furosemide 20 MG tablet   Commonly known as:  LASIX   Used for:  Lymphedema of both lower extremities, Essential hypertension with goal blood pressure less than 140/90        Dose:  20 mg   Take 1 tablet (20 mg) by mouth At Bedtime   Quantity:  30 tablet   Refills:  3       hydrocortisone 1 % cream   Commonly known as:  CORTAID   Used for:  Intertrigo        Apply sparingly to affected area two times daily for 14 days.   Quantity:  60 g   Refills:  3       insulin glargine 100 UNIT/ML injection   Commonly known as:  LANTUS   Used for:  Type 2 diabetes mellitus with diabetic neuropathy, with long-term current use of insulin (H)        Dose:  20 Units   Inject 20 Units Subcutaneous At Bedtime   Quantity:  3 mL   Refills:  1       insulin pen needle 31G X 6 MM   Commonly known as:  ULTICARE MINI   Used for:  Type 2 diabetes, HbA1c goal < 7% (H)        Use daily or as directed.   Quantity:  100 each   Refills:  prn       miconazole 2 % powder   Commonly known as:  MICATIN; MICRO GUARD   Used for:  Fungal dermatitis        Apply topically 2 times daily   Quantity:  71 g   Refills:  0       MULTIVITAMIN PO        1 tablet by mouth daily   Refills:  0       NovoLOG FLEXPEN 100 UNIT/ML injection   Used for:  Type 2 diabetes mellitus with hyperglycemia, with long-term current use of insulin (H)   Generic drug:  insulin aspart        Inject 4 units SQ with breakfast, lunch and dinner.   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   Quantity:  1 Units   Refills:  0       order for DME   Used for:  Seasonal affective disorder (H)        1 SAD light   Quantity:  1 Device   Refills:  1       triamcinolone 0.025 % ointment   Commonly known as:  KENALOG   Used for:  Dermatitis        Apply to sites of dermatitis under the breasts, chest, buttocks - once a day.   Quantity:  80 g   Refills:  1       Urea 20 % Crea cream   Used for:  Xerosis cutis, Tyloma        Apply topically daily   Quantity:  85 g   Refills:  3       * Notice:  This list has 7 medication(s) that are the same as other medications prescribed for you. Read the directions carefully, and ask your doctor or other care provider to review them with you.         Where to get your medicines      These medications were sent to [facility]/pharmacy #[id number] - [address] [address]     Phone:  [phone]     amoxicillin-clavulanate 500-125 MG per tablet                Protect others around you: Learn how to safely use, store and throw away your  medicines at www.disposemymeds.org.        ANTIBIOTIC INSTRUCTION     You've Been Prescribed an Antibiotic - Now What?  Your healthcare team thinks that you or your loved one might have an infection. Some infections can be treated with antibiotics, which are powerful, life-saving drugs. Like all medications, antibiotics have side effects and should only be used when necessary. There are some important things you should know about your antibiotic treatment.      Your healthcare team may run tests before you start taking an antibiotic.    Your team may take samples (e.g., from your blood, urine or other areas) to run tests to look for bacteria. These test can be important to determine if you need an antibiotic at all and, if you do, which antibiotic will work best.      Within a few days, your healthcare team might change or even stop your antibiotic.    Your team may start you on an antibiotic while they are working to find out what is making you sick.    Your team might change your antibiotic because test results show that a different antibiotic would be better to treat your infection.    In some cases, once your team has more information, they learn that you do not need an antibiotic at all. They may find out that you don't have an infection, or that the antibiotic you're taking won't work against your infection. For example, an infection caused by a virus can't be treated with antibiotics. Staying on an antibiotic when you don't need it is more likely to be harmful than helpful.      You may experience side effects from your antibiotic.    Like all medications, antibiotics have side effects. Some of these can be serious.    Let you healthcare team know if you have any known allergies when you are admitted to the hospital.    One significant side effect of nearly all antibiotics is the risk of severe and sometimes deadly diarrhea caused by Clostridium difficile (C. Difficile). This occurs when a person takes  antibiotics because some good germs are destroyed. Antibiotic use allows C. diificile to take over, putting patients at high risk for this serious infection.    As a patient or caregiver, it is important to understand your or your loved one's antibiotic treatment. It is especially important for caregivers to speak up when patients can't speak for themselves. Here are some important questions to ask your healthcare team.    What infection is this antibiotic treating and how do you know I have that infection?    What side effects might occur from this antibiotic?    How long will I need to take this antibiotic?    Is it safe to take this antibiotic with other medications or supplements (e.g., vitamins) that I am taking?     Are there any special directions I need to know about taking this antibiotic? For example, should I take it with food?    How will I be monitored to know whether my infection is responding to the antibiotic?    What tests may help to make sure the right antibiotic is prescribed for me?      Information provided by:  www.cdc.gov/getsmart  U.S. Department of Health and Human Services  Centers for disease Control and Prevention  National Center for Emerging and Zoonotic Infectious Diseases  Division of Healthcare Quality Promotion             Medication List: This is a list of all your medications and when to take them. Check marks below indicate your daily home schedule. Keep this list as a reference.      Medications           Morning Afternoon Evening Bedtime As Needed    ACE/ARB/ARNI NOT PRESCRIBED (INTENTIONAL)   Please choose reason not prescribed, below                                albuterol 108 (90 Base) MCG/ACT Inhaler   Commonly known as:  PROAIR HFA/PROVENTIL HFA/VENTOLIN HFA   Inhale 2 puffs into the lungs every 6 hours as needed for shortness of breath / dyspnea or wheezing                                amoxicillin-clavulanate 500-125 MG per tablet   Commonly known as:  AUGMENTIN    Take 1 tablet by mouth 2 times daily   Last time this was given:  1 tablet on 5/21/2018 12:03 PM                                atorvastatin 10 MG tablet   Commonly known as:  LIPITOR   Take 1 tablet (10 mg) by mouth daily   Last time this was given:  10 mg on 5/20/2018  7:50 PM                                * blood glucose monitoring test strip   Commonly known as:  ONETOUCH ULTRA   Use to test blood sugars 2 times daily or as directed.                                * blood glucose monitoring test strip   Commonly known as:  ONETOUCH ULTRA   Test your blood sugar 3-4 times per day.                                CALCITRIOL PO   Take by mouth daily Unsure of dosage                                carvedilol 12.5 MG tablet   Commonly known as:  COREG   Take 1 tablet (12.5 mg) by mouth 2 times daily (with meals)   Last time this was given:  12.5 mg on 5/21/2018  8:26 AM                                * furosemide 40 MG tablet   Commonly known as:  LASIX   Take 1 tablet (40 mg) by mouth daily                                * furosemide 20 MG tablet   Commonly known as:  LASIX   Take 1 tablet (20 mg) by mouth At Bedtime                                hydrocortisone 1 % cream   Commonly known as:  CORTAID   Apply sparingly to affected area two times daily for 14 days.                                insulin glargine 100 UNIT/ML injection   Commonly known as:  LANTUS   Inject 20 Units Subcutaneous At Bedtime   Last time this was given:  20 Units on 5/20/2018 10:33 PM                                insulin pen needle 31G X 6 MM   Commonly known as:  ULTICARE MINI   Use daily or as directed.                                miconazole 2 % powder   Commonly known as:  MICATIN; MICRO GUARD   Apply topically 2 times daily   Last time this was given:  5/21/2018  8:26 AM                                MULTIVITAMIN PO   1 tablet by mouth daily                                NovoLOG FLEXPEN 100 UNIT/ML injection   Inject 4 units  SQ with breakfast, lunch and dinner.   Generic drug:  insulin aspart                                order for DME   Equipment being ordered: Compression stockings, 20-30 MMHG, knee high                                * order for DME   Equipment being ordered: TEDS stocking  Below the knee 15-20 mg Dispense 2 Use daily                                * order for DME   1 wheelchair                                * order for DME   Equipment being ordered: Right Lower extremity Solaris Ready wrap calf piece:  Size small/length tall , knee piece: Size small , Thigh piece size small/length average.                                order for DME   1 SAD light                                triamcinolone 0.025 % ointment   Commonly known as:  KENALOG   Apply to sites of dermatitis under the breasts, chest, buttocks - once a day.   Last time this was given:  5/21/2018  8:26 AM                                Urea 20 % Crea cream   Apply topically daily                                * Notice:  This list has 7 medication(s) that are the same as other medications prescribed for you. Read the directions carefully, and ask your doctor or other care provider to review them with you.

## 2018-05-18 NOTE — IP AVS SNAPSHOT
UR 8A    [address]    [facility] [address]    Phone:  [phone]                                       After Visit Summary   5/18/2018    Supriya Herr    MRN: [id number]           After Visit Summary Signature Page     I have received my discharge instructions, and my questions have been answered. I have discussed any challenges I see with this plan with the nurse or doctor.    ..........................................................................................................................................  Patient/Patient Representative Signature      ..........................................................................................................................................  Patient Representative Print Name and Relationship to Patient    ..................................................               ................................................  Date                                            Time    ..........................................................................................................................................  Reviewed by Signature/Title    ...................................................              ..............................................  Date                                                            Time

## 2018-05-19 ENCOUNTER — APPOINTMENT (OUTPATIENT)
Dept: GENERAL RADIOLOGY | Facility: CLINIC | Age: 68
DRG: 603 | End: 2018-05-19
Attending: EMERGENCY MEDICINE
Payer: MEDICARE

## 2018-05-19 PROBLEM — E11.628 DIABETIC FOOT INFECTION (H): Status: ACTIVE | Noted: 2018-05-19

## 2018-05-19 PROBLEM — L08.9 DIABETIC FOOT INFECTION (H): Status: ACTIVE | Noted: 2018-05-19

## 2018-05-19 LAB
ANION GAP SERPL CALCULATED.3IONS-SCNC: 9 MMOL/L (ref 3–14)
BASOPHILS # BLD AUTO: 0 10E9/L (ref 0–0.2)
BASOPHILS NFR BLD AUTO: 0.4 %
BUN SERPL-MCNC: 77 MG/DL (ref 7–30)
CALCIUM SERPL-MCNC: 8.7 MG/DL (ref 8.5–10.1)
CHLORIDE SERPL-SCNC: 112 MMOL/L (ref 94–109)
CO2 SERPL-SCNC: 25 MMOL/L (ref 20–32)
CREAT SERPL-MCNC: 3.44 MG/DL (ref 0.52–1.04)
CRP SERPL-MCNC: 5.7 MG/L (ref 0–8)
DIFFERENTIAL METHOD BLD: ABNORMAL
EOSINOPHIL # BLD AUTO: 0.3 10E9/L (ref 0–0.7)
EOSINOPHIL NFR BLD AUTO: 3.6 %
ERYTHROCYTE [DISTWIDTH] IN BLOOD BY AUTOMATED COUNT: 13.3 % (ref 10–15)
ERYTHROCYTE [SEDIMENTATION RATE] IN BLOOD BY WESTERGREN METHOD: 93 MM/H (ref 0–30)
GFR SERPL CREATININE-BSD FRML MDRD: 13 ML/MIN/1.7M2
GLUCOSE BLDC GLUCOMTR-MCNC: 111 MG/DL (ref 70–99)
GLUCOSE BLDC GLUCOMTR-MCNC: 111 MG/DL (ref 70–99)
GLUCOSE BLDC GLUCOMTR-MCNC: 122 MG/DL (ref 70–99)
GLUCOSE BLDC GLUCOMTR-MCNC: 92 MG/DL (ref 70–99)
GLUCOSE SERPL-MCNC: 189 MG/DL (ref 70–99)
GRAM STN SPEC: ABNORMAL
HCT VFR BLD AUTO: 29.8 % (ref 35–47)
HGB BLD-MCNC: 9.6 G/DL (ref 11.7–15.7)
IMM GRANULOCYTES # BLD: 0 10E9/L (ref 0–0.4)
IMM GRANULOCYTES NFR BLD: 0.1 %
LACTATE BLD-SCNC: 0.6 MMOL/L (ref 0.7–2)
LYMPHOCYTES # BLD AUTO: 1.9 10E9/L (ref 0.8–5.3)
LYMPHOCYTES NFR BLD AUTO: 26.9 %
MCH RBC QN AUTO: 30.1 PG (ref 26.5–33)
MCHC RBC AUTO-ENTMCNC: 32.2 G/DL (ref 31.5–36.5)
MCV RBC AUTO: 93 FL (ref 78–100)
MONOCYTES # BLD AUTO: 0.6 10E9/L (ref 0–1.3)
MONOCYTES NFR BLD AUTO: 8.2 %
NEUTROPHILS # BLD AUTO: 4.2 10E9/L (ref 1.6–8.3)
NEUTROPHILS NFR BLD AUTO: 60.8 %
NRBC # BLD AUTO: 0 10*3/UL
NRBC BLD AUTO-RTO: 0 /100
PLATELET # BLD AUTO: 232 10E9/L (ref 150–450)
POTASSIUM SERPL-SCNC: 3.9 MMOL/L (ref 3.4–5.3)
RBC # BLD AUTO: 3.19 10E12/L (ref 3.8–5.2)
SODIUM SERPL-SCNC: 146 MMOL/L (ref 133–144)
SPECIMEN SOURCE: ABNORMAL
WBC # BLD AUTO: 6.9 10E9/L (ref 4–11)

## 2018-05-19 PROCEDURE — A9270 NON-COVERED ITEM OR SERVICE: HCPCS | Mod: GY | Performed by: INTERNAL MEDICINE

## 2018-05-19 PROCEDURE — 25000132 ZZH RX MED GY IP 250 OP 250 PS 637: Mod: GY | Performed by: INTERNAL MEDICINE

## 2018-05-19 PROCEDURE — 87147 CULTURE TYPE IMMUNOLOGIC: CPT | Performed by: EMERGENCY MEDICINE

## 2018-05-19 PROCEDURE — 87070 CULTURE OTHR SPECIMN AEROBIC: CPT | Performed by: EMERGENCY MEDICINE

## 2018-05-19 PROCEDURE — 73630 X-RAY EXAM OF FOOT: CPT | Mod: RT

## 2018-05-19 PROCEDURE — 87077 CULTURE AEROBIC IDENTIFY: CPT | Performed by: EMERGENCY MEDICINE

## 2018-05-19 PROCEDURE — 25000125 ZZHC RX 250: Performed by: INTERNAL MEDICINE

## 2018-05-19 PROCEDURE — 12000001 ZZH R&B MED SURG/OB UMMC

## 2018-05-19 PROCEDURE — 87186 SC STD MICRODIL/AGAR DIL: CPT | Performed by: EMERGENCY MEDICINE

## 2018-05-19 PROCEDURE — 83605 ASSAY OF LACTIC ACID: CPT | Performed by: EMERGENCY MEDICINE

## 2018-05-19 PROCEDURE — 25000131 ZZH RX MED GY IP 250 OP 636 PS 637: Mod: GY | Performed by: INTERNAL MEDICINE

## 2018-05-19 PROCEDURE — 25000128 H RX IP 250 OP 636: Performed by: EMERGENCY MEDICINE

## 2018-05-19 PROCEDURE — 86140 C-REACTIVE PROTEIN: CPT | Performed by: EMERGENCY MEDICINE

## 2018-05-19 PROCEDURE — 85025 COMPLETE CBC W/AUTO DIFF WBC: CPT | Performed by: EMERGENCY MEDICINE

## 2018-05-19 PROCEDURE — 85652 RBC SED RATE AUTOMATED: CPT | Performed by: EMERGENCY MEDICINE

## 2018-05-19 PROCEDURE — 87040 BLOOD CULTURE FOR BACTERIA: CPT | Performed by: EMERGENCY MEDICINE

## 2018-05-19 PROCEDURE — 80048 BASIC METABOLIC PNL TOTAL CA: CPT | Performed by: EMERGENCY MEDICINE

## 2018-05-19 PROCEDURE — 25000125 ZZHC RX 250: Performed by: EMERGENCY MEDICINE

## 2018-05-19 PROCEDURE — 00000146 ZZHCL STATISTIC GLUCOSE BY METER IP

## 2018-05-19 PROCEDURE — 87205 SMEAR GRAM STAIN: CPT | Performed by: EMERGENCY MEDICINE

## 2018-05-19 PROCEDURE — A9270 NON-COVERED ITEM OR SERVICE: HCPCS | Mod: GY | Performed by: EMERGENCY MEDICINE

## 2018-05-19 PROCEDURE — 25000132 ZZH RX MED GY IP 250 OP 250 PS 637: Mod: GY | Performed by: EMERGENCY MEDICINE

## 2018-05-19 PROCEDURE — 25000128 H RX IP 250 OP 636: Performed by: INTERNAL MEDICINE

## 2018-05-19 RX ORDER — ATORVASTATIN CALCIUM 10 MG/1
10 TABLET, FILM COATED ORAL DAILY
Status: DISCONTINUED | OUTPATIENT
Start: 2018-05-19 | End: 2018-05-21 | Stop reason: HOSPADM

## 2018-05-19 RX ORDER — FUROSEMIDE 40 MG
40 TABLET ORAL DAILY
Status: CANCELLED | OUTPATIENT
Start: 2018-05-19

## 2018-05-19 RX ORDER — AMOXICILLIN 250 MG
2 CAPSULE ORAL 2 TIMES DAILY
Status: DISCONTINUED | OUTPATIENT
Start: 2018-05-19 | End: 2018-05-21 | Stop reason: HOSPADM

## 2018-05-19 RX ORDER — TRIAMCINOLONE ACETONIDE 0.25 MG/G
OINTMENT TOPICAL DAILY
Status: DISCONTINUED | OUTPATIENT
Start: 2018-05-19 | End: 2018-05-21 | Stop reason: HOSPADM

## 2018-05-19 RX ORDER — FUROSEMIDE 20 MG
20 TABLET ORAL EVERY EVENING
Status: CANCELLED | OUTPATIENT
Start: 2018-05-19

## 2018-05-19 RX ORDER — ALBUTEROL SULFATE 90 UG/1
2 AEROSOL, METERED RESPIRATORY (INHALATION) EVERY 6 HOURS PRN
Status: DISCONTINUED | OUTPATIENT
Start: 2018-05-19 | End: 2018-05-21 | Stop reason: HOSPADM

## 2018-05-19 RX ORDER — LIDOCAINE 40 MG/G
CREAM TOPICAL
Status: DISCONTINUED | OUTPATIENT
Start: 2018-05-19 | End: 2018-05-21 | Stop reason: HOSPADM

## 2018-05-19 RX ORDER — DEXTROSE MONOHYDRATE 25 G/50ML
25-50 INJECTION, SOLUTION INTRAVENOUS
Status: DISCONTINUED | OUTPATIENT
Start: 2018-05-19 | End: 2018-05-21 | Stop reason: HOSPADM

## 2018-05-19 RX ORDER — NICOTINE POLACRILEX 4 MG
15-30 LOZENGE BUCCAL
Status: DISCONTINUED | OUTPATIENT
Start: 2018-05-19 | End: 2018-05-21 | Stop reason: HOSPADM

## 2018-05-19 RX ORDER — ACETAMINOPHEN 500 MG
1000 TABLET ORAL ONCE
Status: COMPLETED | OUTPATIENT
Start: 2018-05-19 | End: 2018-05-19

## 2018-05-19 RX ORDER — UREA 200 MG/G
CREAM TOPICAL DAILY
Status: CANCELLED | OUTPATIENT
Start: 2018-05-19

## 2018-05-19 RX ORDER — ACETAMINOPHEN 325 MG/1
650 TABLET ORAL EVERY 6 HOURS PRN
Status: DISCONTINUED | OUTPATIENT
Start: 2018-05-19 | End: 2018-05-21 | Stop reason: HOSPADM

## 2018-05-19 RX ORDER — AMOXICILLIN 250 MG
1 CAPSULE ORAL 2 TIMES DAILY
Status: DISCONTINUED | OUTPATIENT
Start: 2018-05-19 | End: 2018-05-21 | Stop reason: HOSPADM

## 2018-05-19 RX ORDER — SODIUM CHLORIDE 9 MG/ML
INJECTION, SOLUTION INTRAVENOUS
Status: DISPENSED
Start: 2018-05-19 | End: 2018-05-19

## 2018-05-19 RX ORDER — CARVEDILOL 12.5 MG/1
12.5 TABLET ORAL 2 TIMES DAILY WITH MEALS
Status: DISCONTINUED | OUTPATIENT
Start: 2018-05-19 | End: 2018-05-21 | Stop reason: HOSPADM

## 2018-05-19 RX ORDER — CARVEDILOL 12.5 MG/1
12.5 TABLET ORAL 2 TIMES DAILY WITH MEALS
Status: CANCELLED | OUTPATIENT
Start: 2018-05-19

## 2018-05-19 RX ORDER — NALOXONE HYDROCHLORIDE 0.4 MG/ML
.1-.4 INJECTION, SOLUTION INTRAMUSCULAR; INTRAVENOUS; SUBCUTANEOUS
Status: DISCONTINUED | OUTPATIENT
Start: 2018-05-19 | End: 2018-05-21 | Stop reason: HOSPADM

## 2018-05-19 RX ADMIN — METRONIDAZOLE 500 MG: 500 INJECTION, SOLUTION INTRAVENOUS at 02:27

## 2018-05-19 RX ADMIN — TRIAMCINOLONE ACETONIDE: 0.25 OINTMENT TOPICAL at 07:52

## 2018-05-19 RX ADMIN — ACETAMINOPHEN 1000 MG: 500 TABLET ORAL at 04:27

## 2018-05-19 RX ADMIN — CEFEPIME HYDROCHLORIDE 500 MG: 2 INJECTION, POWDER, FOR SOLUTION INTRAVENOUS at 23:51

## 2018-05-19 RX ADMIN — MICONAZOLE NITRATE: 2 POWDER TOPICAL at 07:52

## 2018-05-19 RX ADMIN — METRONIDAZOLE 500 MG: 500 INJECTION, SOLUTION INTRAVENOUS at 09:20

## 2018-05-19 RX ADMIN — METRONIDAZOLE 500 MG: 500 INJECTION, SOLUTION INTRAVENOUS at 17:53

## 2018-05-19 RX ADMIN — ACETAMINOPHEN 650 MG: 500 TABLET ORAL at 22:03

## 2018-05-19 RX ADMIN — CEFEPIME HYDROCHLORIDE 2 G: 2 INJECTION, POWDER, FOR SOLUTION INTRAVENOUS at 01:40

## 2018-05-19 RX ADMIN — INSULIN GLARGINE 20 UNITS: 100 INJECTION, SOLUTION SUBCUTANEOUS at 22:06

## 2018-05-19 RX ADMIN — ATORVASTATIN CALCIUM 10 MG: 10 TABLET, FILM COATED ORAL at 14:29

## 2018-05-19 RX ADMIN — CARVEDILOL 12.5 MG: 12.5 TABLET, FILM COATED ORAL at 17:53

## 2018-05-19 RX ADMIN — ACETAMINOPHEN 650 MG: 500 TABLET ORAL at 16:51

## 2018-05-19 ASSESSMENT — ENCOUNTER SYMPTOMS
MYALGIAS: 0
LIGHT-HEADEDNESS: 0
DIZZINESS: 0
VOMITING: 0
NAUSEA: 0
CHILLS: 0
FEVER: 0

## 2018-05-19 NOTE — TELEPHONE ENCOUNTER
Reason for Disposition    [1] Looks infected AND [2] large red area or streak (>2 inches or 5 cm)    Additional Information    Negative: [1] Major bleeding (e.g., actively dripping or spurting) AND [2] can't be stopped    Negative: Amputation    Negative: Shock suspected (e.g., cold/pale/clammy skin, too weak to stand, low BP, rapid pulse)    Negative: Sounds like a life-threatening emergency to the triager    Negative: [1] Bleeding AND [2] won't stop after 10 minutes of direct pressure (using correct technique)    Negative: Skin is split open or gaping  (or length > 1/2 inch or 12 mm on the skin, 1/4 inch or 6 mm on the face)    Negative: [1] Deep cut AND [2] can see bone or tendons    Negative: [1] Dirt in the wound AND [2] not removed with 15 minutes of scrubbing    Negative: Skin loss involves more than 10% of surface area (Note: the palm of the hand = 1%)    Negative: High pressure injection injury (e.g., from paint gun, usually work-related)    Negative: Wound causes numbness (i.e., loss of sensation)    Negative: Wound causes weakness (i.e., decreased ability to move hand, finger, toe)    Negative: [1] SEVERE pain AND [2] not improved 2 hours after pain medicine/ice packs    Protocols used: SKIN INJURY-ADULT-AH  Will go to ER.  Yamilet French RN  [facility]
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
## 2018-05-19 NOTE — ED TRIAGE NOTES
Approx.  1 month ago pt. went to MD to have foreign object removed from bottom of  foot.  Has been to wound care.   Now daughter feels wound is infected.  Pt. having more pain.

## 2018-05-19 NOTE — PLAN OF CARE
Problem: Diabetes, Type 2 (Adult)  Goal: Signs and Symptoms of Listed Potential Problems Will be Absent, Minimized or Managed (Diabetes, Type 2)  Signs and symptoms of listed potential problems will be absent, minimized or managed by discharge/transition of care (reference Diabetes, Type 2 (Adult) CPG).  Outcome: No Change  Patient arrived to the unit around 0450 via cart. Transferred into bed with assist of 1. AxO and VSS upon arrival. Diaz any chest pain, SOB, nausea. Reports BM yesterday. Reports no difficulty voiding. Reports baseline numbness and tingling in BUE. PIV SL. Coccyx area appears reddened and blanchable. Wound to thr right foot is located on the bottom of the foot between the great toe and second toe. Dime size wound with warm reddened area marked surrounding wound. Wounds left PROSPER per moonlight orders. Writer sent page to moonlighter about wound care instructions, ordered to leave PROSPER until day team assesses. Patient off coccyx using pillows. LLE is ambulated and skin is intact. Denies any pain at this time. Patient was orientated to room and call light placed within reach. Continue POC.
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
## 2018-05-20 LAB
ANION GAP SERPL CALCULATED.3IONS-SCNC: 9 MMOL/L (ref 3–14)
BUN SERPL-MCNC: 73 MG/DL (ref 7–30)
CALCIUM SERPL-MCNC: 8.5 MG/DL (ref 8.5–10.1)
CHLORIDE SERPL-SCNC: 117 MMOL/L (ref 94–109)
CO2 SERPL-SCNC: 23 MMOL/L (ref 20–32)
CREAT SERPL-MCNC: 3.24 MG/DL (ref 0.52–1.04)
ERYTHROCYTE [DISTWIDTH] IN BLOOD BY AUTOMATED COUNT: 13.2 % (ref 10–15)
GFR SERPL CREATININE-BSD FRML MDRD: 14 ML/MIN/1.7M2
GLUCOSE BLDC GLUCOMTR-MCNC: 130 MG/DL (ref 70–99)
GLUCOSE BLDC GLUCOMTR-MCNC: 139 MG/DL (ref 70–99)
GLUCOSE BLDC GLUCOMTR-MCNC: 148 MG/DL (ref 70–99)
GLUCOSE BLDC GLUCOMTR-MCNC: 88 MG/DL (ref 70–99)
GLUCOSE BLDC GLUCOMTR-MCNC: 92 MG/DL (ref 70–99)
GLUCOSE SERPL-MCNC: 86 MG/DL (ref 70–99)
HCT VFR BLD AUTO: 27.7 % (ref 35–47)
HGB BLD-MCNC: 8.8 G/DL (ref 11.7–15.7)
MCH RBC QN AUTO: 29.8 PG (ref 26.5–33)
MCHC RBC AUTO-ENTMCNC: 31.8 G/DL (ref 31.5–36.5)
MCV RBC AUTO: 94 FL (ref 78–100)
PLATELET # BLD AUTO: 207 10E9/L (ref 150–450)
POTASSIUM SERPL-SCNC: 4.1 MMOL/L (ref 3.4–5.3)
RBC # BLD AUTO: 2.95 10E12/L (ref 3.8–5.2)
SODIUM SERPL-SCNC: 149 MMOL/L (ref 133–144)
WBC # BLD AUTO: 4.7 10E9/L (ref 4–11)

## 2018-05-20 PROCEDURE — 25000125 ZZHC RX 250: Performed by: INTERNAL MEDICINE

## 2018-05-20 PROCEDURE — A9270 NON-COVERED ITEM OR SERVICE: HCPCS | Mod: GY | Performed by: INTERNAL MEDICINE

## 2018-05-20 PROCEDURE — 25000128 H RX IP 250 OP 636: Performed by: INTERNAL MEDICINE

## 2018-05-20 PROCEDURE — 80048 BASIC METABOLIC PNL TOTAL CA: CPT | Performed by: INTERNAL MEDICINE

## 2018-05-20 PROCEDURE — 00000146 ZZHCL STATISTIC GLUCOSE BY METER IP

## 2018-05-20 PROCEDURE — 36415 COLL VENOUS BLD VENIPUNCTURE: CPT | Performed by: INTERNAL MEDICINE

## 2018-05-20 PROCEDURE — 25000132 ZZH RX MED GY IP 250 OP 250 PS 637: Mod: GY | Performed by: INTERNAL MEDICINE

## 2018-05-20 PROCEDURE — 99233 SBSQ HOSP IP/OBS HIGH 50: CPT | Performed by: INTERNAL MEDICINE

## 2018-05-20 PROCEDURE — 85027 COMPLETE CBC AUTOMATED: CPT | Performed by: INTERNAL MEDICINE

## 2018-05-20 PROCEDURE — 12000001 ZZH R&B MED SURG/OB UMMC

## 2018-05-20 RX ORDER — HYDRALAZINE HYDROCHLORIDE 20 MG/ML
10 INJECTION INTRAMUSCULAR; INTRAVENOUS EVERY 4 HOURS PRN
Status: DISCONTINUED | OUTPATIENT
Start: 2018-05-20 | End: 2018-05-21 | Stop reason: HOSPADM

## 2018-05-20 RX ORDER — SODIUM CHLORIDE 9 MG/ML
INJECTION, SOLUTION INTRAVENOUS
Status: DISPENSED
Start: 2018-05-20 | End: 2018-05-20

## 2018-05-20 RX ADMIN — ATORVASTATIN CALCIUM 10 MG: 10 TABLET, FILM COATED ORAL at 19:50

## 2018-05-20 RX ADMIN — Medication: at 18:48

## 2018-05-20 RX ADMIN — METRONIDAZOLE 500 MG: 500 INJECTION, SOLUTION INTRAVENOUS at 01:55

## 2018-05-20 RX ADMIN — CARVEDILOL 12.5 MG: 12.5 TABLET, FILM COATED ORAL at 18:48

## 2018-05-20 RX ADMIN — INSULIN GLARGINE 20 UNITS: 100 INJECTION, SOLUTION SUBCUTANEOUS at 22:33

## 2018-05-20 RX ADMIN — MICONAZOLE NITRATE: 2 POWDER TOPICAL at 08:10

## 2018-05-20 RX ADMIN — ACETAMINOPHEN 650 MG: 500 TABLET ORAL at 19:50

## 2018-05-20 RX ADMIN — CEFEPIME HYDROCHLORIDE 500 MG: 2 INJECTION, POWDER, FOR SOLUTION INTRAVENOUS at 23:57

## 2018-05-20 RX ADMIN — CARVEDILOL 12.5 MG: 12.5 TABLET, FILM COATED ORAL at 08:09

## 2018-05-20 RX ADMIN — METRONIDAZOLE 500 MG: 500 INJECTION, SOLUTION INTRAVENOUS at 20:37

## 2018-05-20 RX ADMIN — TRIAMCINOLONE ACETONIDE: 0.25 OINTMENT TOPICAL at 08:10

## 2018-05-20 RX ADMIN — METRONIDAZOLE 500 MG: 500 INJECTION, SOLUTION INTRAVENOUS at 09:44

## 2018-05-20 ASSESSMENT — ACTIVITIES OF DAILY LIVING (ADL)
TOILETING: 0-->INDEPENDENT
COGNITION: 0 - NO COGNITION ISSUES REPORTED
DRESS: 0-->INDEPENDENT
RETIRED_EATING: 0-->INDEPENDENT
FALL_HISTORY_WITHIN_LAST_SIX_MONTHS: YES
AMBULATION: 0-->INDEPENDENT
TRANSFERRING: 0-->INDEPENDENT
RETIRED_COMMUNICATION: 0-->UNDERSTANDS/COMMUNICATES WITHOUT DIFFICULTY
BATHING: 0-->INDEPENDENT
SWALLOWING: 0-->SWALLOWS FOODS/LIQUIDS WITHOUT DIFFICULTY

## 2018-05-20 NOTE — PLAN OF CARE
Problem: Patient Care Overview  Goal: Plan of Care/Patient Progress Review  Outcome: Improving    VS:   BP trending high >170s, 187/57 @1600, hydralazine ordered for BP >180. Otherwise no other abnormalities noted. No reports of SOB or chest pain. LS clear equal bilaterally, on room air.    Output:   Voids spontaneously w/out difficulty. Last BM 5/18/18, passing gas.    Activity:   Assist of 1, can shift weight well, independent @ home. Amputee of L leg     Skin: Intact w/ the exception of redness under skin folds - breast/groin/abdomin and red blanchable coccyx - Mepilex applied   Pain:   Minimal. Has available Bengay topical cream and tylenol 650 q6h.    Neuro/CMS:   Intact w/ the exception of baseline neuropathy bilaterally of the upper extremities and RLE    Dressing(s):   None    Diet:   Combination reg, tolerating well. N&V reported   Equipment:   Commode   IV's/Drains:   PIV R hand infusing antibiotic    Plan:   Pt can make needs known, continue to monitor. Expected to discharge 1-2 to prior living arrangement, once culture finalizes    Additional Info:

## 2018-05-20 NOTE — PROGRESS NOTES
Progress Note        Supriya Herr [[id number]] (F) - 68 year old          Assessment and Plan:   68 year old female with a history of DM, HTN, HFpEF, CKD stage 5 not on renal replacement, remote hx traumatic left AKA with right foot cellulitis and associated right diabetic foot ulcer.       Right foot cellulitis: likely presented early since inflammatory markers, vitals wnl, but was rapidly progressing and she is high risk given diabetes and other medical comorbidities. Involved area marked.  Right diabetic foot ulcer: mild erosion on plantar aspect of foot between great toe and second toe  Hardware in place: screw in great toe from prior injury    - started on  IV abx regimen: metronidazole + cefepime- will continue the same as foot appears significantly improved   - Podiatry c/s in AM  - blood cultures x2, wound culture x1 pending        Chronic diastolic HF, not in acute exacerbation  Hypertension --hypertensive upon presentation. Only on carvedilol. Has had complications with hypotension and hyperkalemia previously when she was on ACE/ARB and amlodipine      - continue home carvedilol 12.5mg BID   - hold home furosemide 40mg AM, 20mg PM -monitor       Intertrigo: chronic, pruritic, has been seen by Derm      - topical triamcinolone, miconazole powder      DM2: well controlled, A1c 5.4 in March       - glargine 20u QHS   - SSI      Dyslipidemia : resume atorvastatin 10 mg      Anemia of chronic disease: stable. Did have low iron saturation, has gotten IV iron per Renal      CKD stage 5: makes urine. Cr at baseline. Getting evaluated for renal transplant, has had consult for placement of AVF   Scheduled for stress test on Tuesday  as part of transplant workup       Discharge planning: Disposition Plan   Expected discharge in 1-2 days to prior living arrangement once cultures finalizes.     Entered: Da Justin 05/20/2018, 12:29 PM         Subjective: foot pain resolving   reports having a stinging pain in  foot  No fever or chills     Ros:  4-point ROS negative except in subjective/interval history.       Objective         Physical Exam:  /52 (BP Location: Left arm)  Pulse 61  Temp 96.7  F (35.9  C) (Oral)  Resp 16  Wt 88 kg (194 lb 1.6 oz)  SpO2 99%  BMI 32.3 kg/m2  General: No distress, cooperative, pleasant  HEENT:   Head: NC/AT   Eyes: Sclera white, conjunctiva pink.  PERRL. EOMI   OP: MM moist and pink. No erythema or exudates of posterior pharynx   Neck: Supple. No JVD or cervical lymphadenopathy.   Pulmonary: CTAB, normal respiratory effort. No wheezes, rales or rhonchi   Cardiovascular: RRR. S1 and S2 preset. No m/g/r.  Abdomen:BS+, soft, non-tender, non-distended. No guarding or rebound tenderness.   Extremities: warm, well-perfused. Left leg AKA, stump well healed.   Right lower ext with trace pitting edema, chronic venous stasis changes. Minimal erosion plantar surface of right foot, between great toe and second toe with associated callus, significant erythema, tenderness to palpation.   Erythema  Resolving   Neuro: Alert and oriented x 3. Moves all extremities symmetrically.   LABS (Last four results)  CMP  Recent Labs  Lab 05/20/18  0552 05/19/18  0022   * 146*   POTASSIUM 4.1 3.9   CHLORIDE 117* 112*   CO2 23 25   ANIONGAP 9 9   GLC 86 189*   BUN 73* 77*   CR 3.24* 3.44*   GFRESTIMATED 14* 13*   GFRESTBLACK 17* 16*   JODY 8.5 8.7     CBC  Recent Labs  Lab 05/20/18  0552 05/19/18  0022   WBC 4.7 6.9   RBC 2.95* 3.19*   HGB 8.8* 9.6*   HCT 27.7* 29.8*   MCV 94 93   MCH 29.8 30.1   MCHC 31.8 32.2   RDW 13.2 13.3    232     INRNo lab results found in last 7 days.  Arterial Blood GasNo lab results found in last 7 days.  All cultures:    Recent Labs  Lab 05/19/18  0119 05/19/18  0056 05/19/18  0022   CULT No growth after 1 day Moderate growthAcinetobacter baumannii complexSusceptibility testing in progress*  Heavy growthBeta hemolytic Streptococcus group CSusceptibility testing in  progress*  Moderate growthProbableEscherichia coli*  Moderate growthBeta hemolytic Streptococcus group B*  Culture in progress No growth after 1 day            Da Justin  Hospitalist   [facility], [place]     5/20/2018

## 2018-05-20 NOTE — PLAN OF CARE
Problem: Wound (Includes Pressure Injury) (Adult)  Goal: Signs and Symptoms of Listed Potential Problems Will be Absent, Minimized or Managed (Wound)  Signs and symptoms of listed potential problems will be absent, minimized or managed by discharge/transition of care (reference Wound (Includes Pressure Injury) (Adult) CPG).   Outcome: Improving    VS: Afebrile, BP on the high side    O2: Room air sats wnl   Output: Voiding well.    Last BM: 5-18-18   Activity: Up with assist of one to commode    Skin: Red area in skin folds (abd, groin and breasts) Mepilex on coccyx    Pain: Denies need for pain medications   CMS: Baseline right lower extremity    Dressing: Keep right foot open to air per Dr. Justin    Diet: Regular    LDA: PIV right arm   Equipment: Commode    Plan: Continue current plan of care.     Additional Info:

## 2018-05-20 NOTE — PLAN OF CARE
Problem: Patient Care Overview  Goal: Plan of Care/Patient Progress Review    VS: VSS and afebrile.    O2: In room air, denies SOB and chest pain.   Output: Voids spontaneously without difficulty.   Last BM: 5/18/18 and passing gas.    Activity: Up to bedside commode with SBA   Skin:  Redness between buttocks. mepilex applied at  L buttocks for protection. Pt declined assessment under breast and groin.   Pain: Managed with PRN Tylenol 650mg.    CMS: Numbness on RLE. Numbness and tingling on bilateral upper extremity.    Dressing: Mepilex applied at  L buttocks for protection.   Diet: Renal diet   LDA: PIV at R lower jose d: patent and SL   Equipment: Commode and personal belongings at bedside.    Plan: TB.   Additional Info: Clarify dressing change.  Amputated above L knee.

## 2018-05-20 NOTE — PLAN OF CARE
Problem: Patient Care Overview  Goal: Plan of Care/Patient Progress Review  Outcome: No Change  Note for [id number]:  Alert and oriented. /50, given scheduled Coreg. C/o mild back pain managed with prn tylenol (see eMAR) and repositioning. LLE redness within marked areas, wound on foot open to air. Tolerating regular diet and fluids with no N/V. Voiding without difficulty. Bowel sounds active, last BM yesterday per pt. Up with assist x1 pivot transfer. IV saline locked inbetween antibiotics. Able to make needs known. Continue with POC.

## 2018-05-21 VITALS
TEMPERATURE: 96.5 F | SYSTOLIC BLOOD PRESSURE: 169 MMHG | BODY MASS INDEX: 33.98 KG/M2 | OXYGEN SATURATION: 99 % | HEART RATE: 58 BPM | WEIGHT: 204.2 LBS | DIASTOLIC BLOOD PRESSURE: 53 MMHG | RESPIRATION RATE: 16 BRPM

## 2018-05-21 LAB
GLUCOSE BLDC GLUCOMTR-MCNC: 115 MG/DL (ref 70–99)
GLUCOSE BLDC GLUCOMTR-MCNC: 70 MG/DL (ref 70–99)
GLUCOSE BLDC GLUCOMTR-MCNC: 85 MG/DL (ref 70–99)
GLUCOSE BLDC GLUCOMTR-MCNC: 91 MG/DL (ref 70–99)

## 2018-05-21 PROCEDURE — G0463 HOSPITAL OUTPT CLINIC VISIT: HCPCS | Mod: 25

## 2018-05-21 PROCEDURE — 25000125 ZZHC RX 250: Performed by: INTERNAL MEDICINE

## 2018-05-21 PROCEDURE — 97602 WOUND(S) CARE NON-SELECTIVE: CPT

## 2018-05-21 PROCEDURE — 00000146 ZZHCL STATISTIC GLUCOSE BY METER IP

## 2018-05-21 PROCEDURE — 25000132 ZZH RX MED GY IP 250 OP 250 PS 637: Mod: GY | Performed by: INTERNAL MEDICINE

## 2018-05-21 PROCEDURE — A9270 NON-COVERED ITEM OR SERVICE: HCPCS | Mod: GY | Performed by: INTERNAL MEDICINE

## 2018-05-21 PROCEDURE — 99239 HOSP IP/OBS DSCHRG MGMT >30: CPT | Performed by: INTERNAL MEDICINE

## 2018-05-21 RX ORDER — AMOXICILLIN AND CLAVULANATE POTASSIUM 500; 125 MG/1; MG/1
1 TABLET, FILM COATED ORAL EVERY 12 HOURS SCHEDULED
Status: DISCONTINUED | OUTPATIENT
Start: 2018-05-21 | End: 2018-05-21 | Stop reason: HOSPADM

## 2018-05-21 RX ORDER — AMOXICILLIN AND CLAVULANATE POTASSIUM 500; 125 MG/1; MG/1
1 TABLET, FILM COATED ORAL 2 TIMES DAILY
Qty: 14 TABLET | Refills: 0 | Status: SHIPPED | OUTPATIENT
Start: 2018-05-21 | End: 2018-06-18

## 2018-05-21 RX ORDER — ATORVASTATIN CALCIUM 10 MG/1
20 TABLET, FILM COATED ORAL DAILY
Qty: 30 TABLET | COMMUNITY
Start: 2018-05-21 | End: 2018-09-13

## 2018-05-21 RX ADMIN — AMOXICILLIN AND CLAVULANATE POTASSIUM 1 TABLET: 500; 125 TABLET, FILM COATED ORAL at 12:03

## 2018-05-21 RX ADMIN — MICONAZOLE NITRATE: 2 POWDER TOPICAL at 08:26

## 2018-05-21 RX ADMIN — CARVEDILOL 12.5 MG: 12.5 TABLET, FILM COATED ORAL at 08:26

## 2018-05-21 RX ADMIN — TRIAMCINOLONE ACETONIDE: 0.25 OINTMENT TOPICAL at 08:26

## 2018-05-21 RX ADMIN — METRONIDAZOLE 500 MG: 500 INJECTION, SOLUTION INTRAVENOUS at 05:05

## 2018-05-21 RX ADMIN — ACETAMINOPHEN 650 MG: 500 TABLET ORAL at 06:12

## 2018-05-21 ASSESSMENT — ENCOUNTER SYMPTOMS
COLOR CHANGE: 1
NUMBNESS: 1
WEAKNESS: 0

## 2018-05-21 NOTE — DISCHARGE SUMMARY
Discharge Summary    Supriya Herr MRN# [id number]   YOB: 1950 Age: 68 year old     Date of Admission:  5/18/2018  Date of Discharge:  5/21/2018  Admitting Physician:  Da Justin MD  Discharge Physician:  Da Justin MD (Contact: [phone])  Discharging Service:  Internal Medicine     Primary Provider: Noam Jackson          Discharge Diagnosis:   Diabetic foot infections   DM  CKD  HTN  HL  Anxiety                Discharge Disposition:   Discharged to home           Condition on Discharge:   Discharge condition: Stable   Code status on discharge: Full Code           Procedures:   None           Discharge Medications:     Current Discharge Medication List      START taking these medications    Details   amoxicillin-clavulanate (AUGMENTIN) 500-125 MG per tablet Take 1 tablet by mouth 2 times daily  Qty: 14 tablet, Refills: 0    Associated Diagnoses: Cellulitis of foot, right      atorvastatin (LIPITOR) 10 MG tablet Take 1 tablet (10 mg) by mouth daily  Qty: 30 tablet    Associated Diagnoses: Hyperlipidemia LDL goal <100         CONTINUE these medications which have NOT CHANGED    Details   CALCITRIOL PO Take by mouth daily Unsure of dosage      carvedilol (COREG) 12.5 MG tablet Take 1 tablet (12.5 mg) by mouth 2 times daily (with meals)  Qty: 60 tablet, Refills: 11    Associated Diagnoses: Essential hypertension with goal blood pressure less than 140/90      !! furosemide (LASIX) 20 MG tablet Take 1 tablet (20 mg) by mouth At Bedtime  Qty: 30 tablet, Refills: 3    Comments: 20mg in afternoon or evening  Associated Diagnoses: Lymphedema of both lower extremities; Essential hypertension with goal blood pressure less than 140/90      !! furosemide (LASIX) 40 MG tablet Take 1 tablet (40 mg) by mouth daily  Qty: 30 tablet, Refills: 11    Comments: Will take 40mg in AM, 20 mg in PM  Associated Diagnoses: Lymphedema of both lower extremities      insulin glargine (LANTUS) 100 UNIT/ML injection  Inject 20 Units Subcutaneous At Bedtime  Qty: 3 mL, Refills: 1    Associated Diagnoses: Type 2 diabetes mellitus with diabetic neuropathy, with long-term current use of insulin (H)      NOVOLOG FLEXPEN 100 UNIT/ML soln Inject 4 units SQ with breakfast, lunch and dinner.  Qty: 15 mL, Refills: 3    Associated Diagnoses: Type 2 diabetes mellitus with hyperglycemia, with long-term current use of insulin (H)      ACE/ARB/ARNI NOT PRESCRIBED, INTENTIONAL, Please choose reason not prescribed, below    Associated Diagnoses: CKD (chronic kidney disease) stage 5, GFR less than 15 ml/min (H)      albuterol (PROAIR HFA, PROVENTIL HFA, VENTOLIN HFA) 108 (90 BASE) MCG/ACT inhaler Inhale 2 puffs into the lungs every 6 hours as needed for shortness of breath / dyspnea or wheezing  Qty: 3 Inhaler, Refills: 1    Associated Diagnoses: Cough      !! blood glucose monitoring (ONE TOUCH ULTRA) test strip Use to test blood sugars 2 times daily or as directed.  Qty: 200 strip, Refills: 3    Associated Diagnoses: Type 2 diabetes mellitus with stage 3 chronic kidney disease, without long-term current use of insulin (H)      !! blood glucose monitoring (ONETOUCH ULTRA) test strip Test your blood sugar 3-4 times per day.  Qty: 200 strip, Refills: 3    Associated Diagnoses: Type 2 diabetes mellitus with hyperglycemia, with long-term current use of insulin (H)      hydrocortisone (CORTAID) 1 % cream Apply sparingly to affected area two times daily for 14 days.  Qty: 60 g, Refills: 3    Associated Diagnoses: Intertrigo      insulin pen needle (ULTICARE MINI) 31G X 6 MM Use daily or as directed.  Qty: 100 each, Refills: prn    Associated Diagnoses: Type 2 diabetes, HbA1c goal < 7% (H)      miconazole (MICATIN; MICRO GUARD) 2 % powder Apply topically 2 times daily  Qty: 71 g, Refills: 0    Associated Diagnoses: Fungal dermatitis      MULTIVITAMIN OR 1 tablet by mouth daily      !! ORDER FOR DME Equipment being ordered: Compression stockings, 20-30  MMHG, knee high  Qty: 1 Device, Refills: 0    Associated Diagnoses: Edema; Hypertension goal BP (blood pressure) < 140/90      !! order for DME Equipment being ordered: TEDS stocking   Below the knee 15-20 mg  Dispense 2  Use daily  Qty: 2 Device, Refills: 1    Associated Diagnoses: Localized edema      !! order for DME 1 wheelchair  Qty: 1 Device, Refills: 0    Associated Diagnoses: Traumatic amputation of lower extremity above knee, unspecified laterality, subsequent encounter (H); CKD (chronic kidney disease) stage 3, GFR 30-59 ml/min; Type 2 diabetes mellitus with stage 3 chronic kidney disease, without long-term current use of insulin (H)      !! order for DME Equipment being ordered: Right Lower extremity Solaris Ready wrap calf piece:  Size small/length tall , knee piece: Size small , Thigh piece size small/length average.  Qty: 1 Units, Refills: 0    Associated Diagnoses: Edema of lower extremity      !! order for DME 1 SAD light  Qty: 1 Device, Refills: 1    Associated Diagnoses: Seasonal affective disorder (H)      triamcinolone (KENALOG) 0.025 % ointment Apply to sites of dermatitis under the breasts, chest, buttocks - once a day.  Qty: 80 g, Refills: 1    Associated Diagnoses: Dermatitis      Urea 20 % CREA cream Apply topically daily  Qty: 85 g, Refills: 3    Associated Diagnoses: Xerosis cutis; Tyloma       !! - Potential duplicate medications found. Please discuss with provider.                Consultations:   [facility]              Brief History of Illness:   Supriya Herr is a 68 year old female with a history of DM, HTN, HFpEF, CKD stage 5 not on renal replacement, remote hx traumatic left AKA who presents with right foot pain and redness rapidly progressing over the past day or so.          Hospital Course:   68 year old female with a history of DM, HTN, HFpEF, CKD stage 5 not on renal replacement, remote hx traumatic left AKA with right foot cellulitis and associated right diabetic foot ulcer.        Right foot cellulitis: likely presented early since inflammatory markers, vitals wnl, but was rapidly progressing and she is high risk given diabetes and other medical comorbidities. Involved area marked.  Right diabetic foot ulcer: mild erosion on plantar aspect of foot between great toe and second toe  Hardware in place: screw in great toe from prior injury    - started on  IV abx regimen: metronidazole + cefepime- will continue the same as foot appears significantly improved   Wound cultures- grew acinetobacter and Betal hemolytic strep         -antibiotics changed to Augmentin 625 mg BID based on cultures sensitivities      She will follow with  in three days.         Chronic diastolic HF, not in acute exacerbation  Hypertension --hypertensive upon presentation. Only on carvedilol. Has had complications with hypotension and hyperkalemia previously when she was on ACE/ARB and amlodipine      - continue home carvedilol 12.5mg BID   - hold home furosemide 40mg AM, 20mg PM -monitor       Intertrigo: chronic, pruritic, has been seen by Derm      - topical triamcinolone, miconazole powder      DM2: well controlled, A1c 5.4 in March       - glargine 20u QHS   - SSI       Dyslipidemia : resume atorvastatin 10 mg       Anemia of chronic disease: stable. Did have low iron saturation, has gotten IV iron per Renal      CKD stage 5: makes urine. Cr at baseline. Getting evaluated for renal transplant, has had consult for placement of AVF   Scheduled for stress test on Tuesday  as part of transplant workup       D/w  and patient daughter               Final Day of Progress before Discharge:       Physical Exam:  Blood pressure 157/55, pulse 58, temperature 96.9  F (36.1  C), temperature source Oral, resp. rate 18, weight 92.6 kg (204 lb 3.2 oz), SpO2 97 %, not currently breastfeeding.  HEENT:                       Head: NC/AT                       Eyes: Sclera white, conjunctiva pink.  PERRL. EOMI                        OP: MM moist and pink. No erythema or exudates of posterior pharynx   Neck: Supple. No JVD or cervical lymphadenopathy.   Pulmonary: CTAB, normal respiratory effort. No wheezes, rales or rhonchi   Cardiovascular: RRR. S1 and S2 preset. No m/g/r.  Abdomen:BS+, soft, non-tender, non-distended. No guarding or rebound tenderness.   Extremities: warm, well-perfused. Left leg AKA, stump well healed.   Right lower ext with trace pitting edema, chronic venous stasis changes. Minimal erosion plantar surface of right foot, between great toe and second toe with associated callus,  Erythema and tenderness almost resolved   Neuro: Alert and oriented x 3. Moves all extremities symmetrically.            Data:  All laboratory data reviewed             Significant Results:   No results found for this or any previous visit (from the past 48 hour(s)).             Pending Results:   Unresulted Labs Ordered in the Past 30 Days of this Admission     Date and Time Order Name Status Description    5/19/2018 0018 Wound Culture Aerobic Bacterial Preliminary     5/19/2018 0018 Blood culture Preliminary     5/19/2018 0018 Blood culture Preliminary     3/29/2018 0707 FACTOR 5 LEIDEN MUTATION ANALYSIS In process     3/29/2018 0707 FACTOR 2 PROTHROMBIN [id number] MUT ANAL In process                   Discharge Instructions and Follow-Up:     Discharge Procedure Orders  Reason for your hospital stay   Order Comments: Cellulitis of rt.foot     Adult [facility] Follow-up and recommended labs and tests   Order Comments: Follow with Podiatry on Thursday 5/24/18     Appointments on [place] and/or [facility] campus (with [facility] or [facility] provider or service). Call [phone] if you haven't heard regarding these appointments within 7 days of discharge.     Activity   Order Comments: Your activity upon discharge: activity as tolerated   Order Specific Question Answer Comments   Is discharge order? Yes      Full Code     Diet   Order Comments:  Follow this diet upon discharge: Moderate consistent carbohydrate (8468-1686 joss / 4-6 CHO units per meal)   Order Specific Question Answer Comments   Is discharge order? Yes             Attestation:  Da Justin.    Time spent on patient: 35 minutes total including face to face and coordinating care time reviewing current illness, any medication changes, and the care plan for today.

## 2018-05-21 NOTE — DISCHARGE SUMMARY
Pt received paperwork from previous RN, writer went over paperwork with pt and daughter. Printed off wound care instructions for them to follow. Medications being sent to home pharmacy. Pt and daughter had no other questions before discharge.

## 2018-05-21 NOTE — PLAN OF CARE
Problem: Wound (Includes Pressure Injury) (Adult)  Goal: Signs and Symptoms of Listed Potential Problems Will be Absent, Minimized or Managed (Wound)  Signs and symptoms of listed potential problems will be absent, minimized or managed by discharge/transition of care (reference Wound (Includes Pressure Injury) (Adult) CPG).   Outcome: Improving          VS:     Pt A/O X 4. Afebrile. VSS. /55 (BP Location: Right arm)  Pulse 58  Temp 96.9  F (36.1  C) (Oral)  Resp 18  SpO2 97%.  Lungs- clear and equal bilaterally. IS encouraged. Denies nausea, shortness of breath, and chest pain. BG 70 before breakfast and 91 before lunch. No coverage given.      Output:     Bowels- active in all four quadrants. Voids spontaneously without   difficulty.      Activity:     Pt up SBA with wheelchair.     Skin: Pressure sore on L buttock. Covered with mepilex. To be changed EOD. Right foot dressing changed per POC by WOC nurse. Continues to have erythema around wound.      Pain:     Denies   CMS:     CMS and Neuro's are intact. Baseline numbness and tingling present in bilat hands.       Dressing:     Right foot dressing is CDI.      Diet:     Pt is on a Renal and Consistent CHO diet and appetite was good this shift.       LDA:     PIV removed from the right hand.      Equipment:     Wheelchair     Plan:     Pt to discharge home today around 1700.     Additional Info:     Pt said that her daughter will be caring for her at home and dressing changes will be done by daughter daily.

## 2018-05-21 NOTE — PROGRESS NOTES
[facility] Nurse Inpatient Wound Assessment     Initial  Assessment  Reason for consultation: Evaluate and treat right plantar foot wound, left buttock wound    Assessment  Right plantar foot wound due to Diabetic Ulcer vs cellulitis  Status: initial assessment    Left buttock wound due to Pressure Injury vs friction during wheelchair transfers  Status: initial assessment     cleft wound due to Moisture Associated Skin Damage  Status: initial assessment    Treatment Plan  Right plantar foot wound: wash daily with wound cleanser and gauze. Apply a strip of Aquacel Ag (order #[id number]) over skin between great toe and 2nd toe to the plantar aspect of 1st MPJ (discolored area on bottom of foot). Cover with 4x4 and secure with ACE wrap. Patient to wear post-op shoe AT ALL TIMES WHEN OUT OF BED.  Left buttock wound: wash every other day with wound cleanser and gauze. Paint maciel-wound skin with Cavalon No Sting and cover with Mepilex 4x4. Rachna cleft should be treated with Criticaide Paste twice daily.  Orders Written  WO Nurse follow-up plan:weekly  Nursing to notify the Provider(s) and re-consult the WO Nurse if wound(s) deteriorates or new skin concern.    Patient History  According to provider note(s):  68 year old female with a history of DM, HTN, HFpEF, CKD stage 5 not on renal replacement, remote hx traumatic left AKA with right foot cellulitis and associated right diabetic foot ulcer.     Objective Data  Containment of urine/stool: Continent of bowel and Continent of bladder    Active Diet Order    Active Diet Order      Combination Diet Regular Diet Adult; Renal Diet; 1792-5028 Calories: High Consistent CHO (4-7 CHO units/meal)    Output:   I/O last 3 completed shifts:  In: -   Out: 550 [Urine:550]    Risk Assessment:    Ben Ben Score  Av  Min: 16  Max: 18                            Labs:   Recent Labs  Lab 18  0552 18  0022   HGB 8.8* 9.6*   WBC 4.7 6.9   CRP  --  5.7           Recent  Labs  Lab 18  0119 18  0056 18  0022   CULT No growth after 2 days Moderate growthAcinetobacter baumannii complex*  Heavy growthBeta hemolytic Streptococcus group C*  Moderate growthProbableEscherichia coli*  Moderate growthBeta hemolytic Streptococcus group B*  Culture in progress No growth after 2 days       Physical Exam  Skin assessment:   Focused skin inspection: right foot and bilateral buttocks     Wound Location:  Right foot  Date of last photo 18     Wound History: Present on admission and believed to be source of cellulitis  Measurements (length x width x depth, in cm) opening measures 0.2 cm x 0.2 cm  x  0.2 cm- unable to pack   Wound Base: unable to visualize  Tunneling: unknown- wound opening is too small to probe  Undermining: unknown- wound opening is too small to probe  Palpation of the wound bed: boggy   Periwound skin: dry/scaly  Color: pale  Temperature: normal   Drainage:, small  Description of drainage: purulent  Odor: none  Pain: with palpation, sharp       Wound Location:  Left buttock  Date of last photo 18    Wound History: Present on admission. Appears to be friction from sliding during wheel chair transfers. Mild moisture associated skin damage in the  cleft- no open areas.  Measurements (length x width x depth, in cm) 2.5 cm x 0.5 cm  x  0.01 cm of open area, surrounding skin with peeled epidermis  Wound Base: dermis  Palpation of the wound bed: normal   Periwound skin: peeling epidermis  Color: pale and pink  Temperature: normal   Drainage:, none  Description of drainage: none  Odor: none  Pain: denies    Interventions  Current support surface: Standard  Atmos Air mattress    Current off-loading measures: Foam padding and Pillows under calves  Visual inspection of wound(s) completed  Wound Care: done per plan of care    Supplies: ordered: Aquacel Ag  Education provided today: wearing shoes when out of bed, repositioning,  appropriate transfers    Discussed plan of care with Patient and Nurse    Face to face time: 15 minutes      Celine Hall RN

## 2018-05-21 NOTE — PLAN OF CARE
Problem: Diabetes, Type 2 (Adult)  Goal: Signs and Symptoms of Listed Potential Problems Will be Absent, Minimized or Managed (Diabetes, Type 2)  Signs and symptoms of listed potential problems will be absent, minimized or managed by discharge/transition of care (reference Diabetes, Type 2 (Adult) CPG).   Outcome: No Change  Patient is Ax). VSS. Denies any chest pain, SOB, new numbness or tingling. Denies any pain. Wound to the right foot is PROSPER with no drainage. Mepilex to the coccyx is CDI. Encourage repositioning from side to side. Voiding without difficulty in the bathroom with stand pivot to the wheelchair. No BM this shift. BS at 0200 was 85, apple juice given. Patient was able to use call light to make needs known and call light remained within reach for the duration of the shift. Continue POC.    0600: Tylenol given for headache.

## 2018-05-22 ENCOUNTER — HOSPITAL ENCOUNTER (OUTPATIENT)
Dept: NUCLEAR MEDICINE | Facility: CLINIC | Age: 68
Setting detail: NUCLEAR MEDICINE
End: 2018-05-22
Attending: INTERNAL MEDICINE
Payer: MEDICARE

## 2018-05-22 ENCOUNTER — HOSPITAL ENCOUNTER (OUTPATIENT)
Dept: CARDIOLOGY | Facility: CLINIC | Age: 68
Discharge: HOME | End: 2018-05-22
Attending: INTERNAL MEDICINE | Admitting: INTERNAL MEDICINE
Payer: MEDICARE

## 2018-05-22 DIAGNOSIS — I50.32 CHRONIC DIASTOLIC HEART FAILURE (H): ICD-10-CM

## 2018-05-22 DIAGNOSIS — R91.8 PULMONARY NODULES: Primary | ICD-10-CM

## 2018-05-22 LAB
BACTERIA SPEC CULT: ABNORMAL
SPECIMEN SOURCE: ABNORMAL

## 2018-05-22 PROCEDURE — 93017 CV STRESS TEST TRACING ONLY: CPT

## 2018-05-22 PROCEDURE — 78452 HT MUSCLE IMAGE SPECT MULT: CPT

## 2018-05-22 PROCEDURE — A9502 TC99M TETROFOSMIN: HCPCS | Performed by: INTERNAL MEDICINE

## 2018-05-22 PROCEDURE — 93018 CV STRESS TEST I&R ONLY: CPT | Performed by: INTERNAL MEDICINE

## 2018-05-22 PROCEDURE — 93016 CV STRESS TEST SUPVJ ONLY: CPT | Performed by: INTERNAL MEDICINE

## 2018-05-22 PROCEDURE — 25000128 H RX IP 250 OP 636: Performed by: INTERNAL MEDICINE

## 2018-05-22 PROCEDURE — 34300033 ZZH RX 343: Performed by: INTERNAL MEDICINE

## 2018-05-22 PROCEDURE — 40000556 ZZH STATISTIC PERIPHERAL IV START W US GUIDANCE

## 2018-05-22 RX ORDER — ALBUTEROL SULFATE 90 UG/1
2 AEROSOL, METERED RESPIRATORY (INHALATION) EVERY 5 MIN PRN
Status: DISCONTINUED | OUTPATIENT
Start: 2018-05-22 | End: 2018-05-23 | Stop reason: HOSPADM

## 2018-05-22 RX ORDER — ACYCLOVIR 200 MG/1
0-1 CAPSULE ORAL
Status: DISCONTINUED | OUTPATIENT
Start: 2018-05-22 | End: 2018-05-23 | Stop reason: HOSPADM

## 2018-05-22 RX ORDER — AMINOPHYLLINE 25 MG/ML
50-100 INJECTION, SOLUTION INTRAVENOUS
Status: DISCONTINUED | OUTPATIENT
Start: 2018-05-22 | End: 2018-05-23 | Stop reason: HOSPADM

## 2018-05-22 RX ORDER — REGADENOSON 0.08 MG/ML
0.4 INJECTION, SOLUTION INTRAVENOUS ONCE
Status: COMPLETED | OUTPATIENT
Start: 2018-05-22 | End: 2018-05-22

## 2018-05-22 RX ADMIN — TETROFOSMIN 35.6 MCI.: 1.38 INJECTION, POWDER, LYOPHILIZED, FOR SOLUTION INTRAVENOUS at 11:24

## 2018-05-22 RX ADMIN — TETROFOSMIN 9 MCI.: 1.38 INJECTION, POWDER, LYOPHILIZED, FOR SOLUTION INTRAVENOUS at 09:34

## 2018-05-22 RX ADMIN — REGADENOSON 0.4 MG: 0.08 INJECTION, SOLUTION INTRAVENOUS at 10:47

## 2018-05-22 NOTE — PROGRESS NOTES
Plan for f/u of 4/2018 chest CT  1. Refer to pulmonary nodule clinic (orders in place)  2. Left-sided renal cyst- she had a renal US in 11/2017 that showed a 1.5 cm cortical cyst. No f/u needed.  3. She had a mammogram same day as CT (CT showed tiny left breast nodule) that was normal. Annual f/u recommended.

## 2018-05-23 ENCOUNTER — TELEPHONE (OUTPATIENT)
Dept: NEPHROLOGY | Facility: CLINIC | Age: 68
End: 2018-05-23

## 2018-05-23 ENCOUNTER — TELEPHONE (OUTPATIENT)
Dept: TRANSPLANT | Facility: CLINIC | Age: 68
End: 2018-05-23

## 2018-05-23 DIAGNOSIS — N18.5 CKD (CHRONIC KIDNEY DISEASE) STAGE 5, GFR LESS THAN 15 ML/MIN (H): Primary | ICD-10-CM

## 2018-05-23 NOTE — TELEPHONE ENCOUNTER
Scheduled patient to see MARY Shen tomorrow as daughter has concerns with recent labs and wanted to be sure she she be seen in Nephrology before next appointment with Dr. Basilio (not scheduled yet). Labs prior.   Lu Goodwin LPN  Nephrology  [phone]

## 2018-05-23 NOTE — TELEPHONE ENCOUNTER
[facility] Call Center    Phone Message    May a detailed message be left on voicemail: yes    Reason for Call: Other: Patient's daughter Caroline calling back to reschedule appt on 6/4 with Mario that clinic had to cancel.  She wants to know who her mom should see.  She also wants to see someone soon, as her mom has stage 5 kidney failure.  She also has questions about infusions.      Action Taken: Message routed to:  [facility] ([facility]): hammad

## 2018-05-23 NOTE — TELEPHONE ENCOUNTER
Called daughter (unbeknown to me, as I thought I was calling the pt), and she was in question why her mom needed this appt for pulm nodule clinic, she stated that she was driving and would call me back.  Pt is sched for Wed, June 20 at 1pm w/DIncer

## 2018-05-24 ENCOUNTER — OFFICE VISIT (OUTPATIENT)
Dept: WOUND CARE | Facility: CLINIC | Age: 68
End: 2018-05-24
Payer: MEDICARE

## 2018-05-24 ENCOUNTER — OFFICE VISIT (OUTPATIENT)
Dept: NEPHROLOGY | Facility: CLINIC | Age: 68
End: 2018-05-24
Attending: PHYSICIAN ASSISTANT
Payer: MEDICARE

## 2018-05-24 VITALS
HEIGHT: 65 IN | SYSTOLIC BLOOD PRESSURE: 171 MMHG | OXYGEN SATURATION: 98 % | HEART RATE: 54 BPM | BODY MASS INDEX: 32.62 KG/M2 | WEIGHT: 195.8 LBS | DIASTOLIC BLOOD PRESSURE: 73 MMHG

## 2018-05-24 DIAGNOSIS — E87.0 HYPERNATREMIA: ICD-10-CM

## 2018-05-24 DIAGNOSIS — N18.4 TYPE 2 DIABETES MELLITUS WITH STAGE 4 CHRONIC KIDNEY DISEASE, WITH LONG-TERM CURRENT USE OF INSULIN (H): Primary | ICD-10-CM

## 2018-05-24 DIAGNOSIS — E11.22 TYPE 2 DIABETES MELLITUS WITH STAGE 4 CHRONIC KIDNEY DISEASE, WITH LONG-TERM CURRENT USE OF INSULIN (H): Primary | ICD-10-CM

## 2018-05-24 DIAGNOSIS — E11.40 TYPE 2 DIABETES MELLITUS WITH DIABETIC NEUROPATHY, WITH LONG-TERM CURRENT USE OF INSULIN (H): ICD-10-CM

## 2018-05-24 DIAGNOSIS — N18.5 CKD (CHRONIC KIDNEY DISEASE) STAGE 5, GFR LESS THAN 15 ML/MIN (H): ICD-10-CM

## 2018-05-24 DIAGNOSIS — D50.9 IRON DEFICIENCY ANEMIA, UNSPECIFIED IRON DEFICIENCY ANEMIA TYPE: ICD-10-CM

## 2018-05-24 DIAGNOSIS — L97.412 DIABETIC ULCER OF RIGHT MIDFOOT ASSOCIATED WITH TYPE 2 DIABETES MELLITUS, WITH FAT LAYER EXPOSED (H): ICD-10-CM

## 2018-05-24 DIAGNOSIS — Z79.4 TYPE 2 DIABETES MELLITUS WITH STAGE 4 CHRONIC KIDNEY DISEASE, WITH LONG-TERM CURRENT USE OF INSULIN (H): Primary | ICD-10-CM

## 2018-05-24 DIAGNOSIS — I15.0 RENOVASCULAR HYPERTENSION: ICD-10-CM

## 2018-05-24 DIAGNOSIS — L03.031 CELLULITIS OF TOE OF RIGHT FOOT: ICD-10-CM

## 2018-05-24 DIAGNOSIS — Z79.4 TYPE 2 DIABETES MELLITUS WITH DIABETIC NEUROPATHY, WITH LONG-TERM CURRENT USE OF INSULIN (H): ICD-10-CM

## 2018-05-24 DIAGNOSIS — N18.5 CKD (CHRONIC KIDNEY DISEASE) STAGE 5, GFR LESS THAN 15 ML/MIN (H): Primary | ICD-10-CM

## 2018-05-24 DIAGNOSIS — E11.621 DIABETIC ULCER OF RIGHT MIDFOOT ASSOCIATED WITH TYPE 2 DIABETES MELLITUS, WITH FAT LAYER EXPOSED (H): ICD-10-CM

## 2018-05-24 LAB
ALBUMIN SERPL-MCNC: 2.6 G/DL (ref 3.4–5)
ANION GAP SERPL CALCULATED.3IONS-SCNC: 6 MMOL/L (ref 3–14)
BUN SERPL-MCNC: 59 MG/DL (ref 7–30)
CALCIUM SERPL-MCNC: 8.9 MG/DL (ref 8.5–10.1)
CHLORIDE SERPL-SCNC: 115 MMOL/L (ref 94–109)
CO2 SERPL-SCNC: 23 MMOL/L (ref 20–32)
CREAT SERPL-MCNC: 3.26 MG/DL (ref 0.52–1.04)
CREAT UR-MCNC: 40 MG/DL
ERYTHROCYTE [DISTWIDTH] IN BLOOD BY AUTOMATED COUNT: 13.1 % (ref 10–15)
GFR SERPL CREATININE-BSD FRML MDRD: 14 ML/MIN/1.7M2
GLUCOSE SERPL-MCNC: 69 MG/DL (ref 70–99)
HCT VFR BLD AUTO: 29.5 % (ref 35–47)
HGB BLD-MCNC: 9.4 G/DL (ref 11.7–15.7)
MCH RBC QN AUTO: 30.5 PG (ref 26.5–33)
MCHC RBC AUTO-ENTMCNC: 31.9 G/DL (ref 31.5–36.5)
MCV RBC AUTO: 96 FL (ref 78–100)
PHOSPHATE SERPL-MCNC: 4.3 MG/DL (ref 2.5–4.5)
PLATELET # BLD AUTO: 221 10E9/L (ref 150–450)
POTASSIUM SERPL-SCNC: 4.4 MMOL/L (ref 3.4–5.3)
PROT UR-MCNC: 2.38 G/L
PROT/CREAT 24H UR: 5.97 G/G CR (ref 0–0.2)
PTH-INTACT SERPL-MCNC: 55 PG/ML (ref 18–80)
RBC # BLD AUTO: 3.08 10E12/L (ref 3.8–5.2)
SODIUM SERPL-SCNC: 144 MMOL/L (ref 133–144)
WBC # BLD AUTO: 6 10E9/L (ref 4–11)

## 2018-05-24 PROCEDURE — G0463 HOSPITAL OUTPT CLINIC VISIT: HCPCS | Mod: ZF

## 2018-05-24 PROCEDURE — 83970 ASSAY OF PARATHORMONE: CPT | Performed by: PHYSICIAN ASSISTANT

## 2018-05-24 PROCEDURE — 80069 RENAL FUNCTION PANEL: CPT | Performed by: PHYSICIAN ASSISTANT

## 2018-05-24 PROCEDURE — 84156 ASSAY OF PROTEIN URINE: CPT | Performed by: PHYSICIAN ASSISTANT

## 2018-05-24 PROCEDURE — 85027 COMPLETE CBC AUTOMATED: CPT | Performed by: PHYSICIAN ASSISTANT

## 2018-05-24 PROCEDURE — 82306 VITAMIN D 25 HYDROXY: CPT | Performed by: PHYSICIAN ASSISTANT

## 2018-05-24 ASSESSMENT — PAIN SCALES - GENERAL: PAINLEVEL: NO PAIN (0)

## 2018-05-24 NOTE — MR AVS SNAPSHOT
After Visit Summary   5/24/2018    Supriya Herr    MRN: [id number]           Patient Information     Date Of Birth          1950        Visit Information        Provider Department      5/24/2018 3:30 PM Sara Martin PA-C [facility]        Care Instructions    Check blood pressures at home if above 140/90 or less than 110/50.  If remain high, we will need to increase the blood pressure medicine.  Call tomorrow and will follow-up in one week as well.    Monitor for signs of uremia (accumulation of toxins from reduced kidney function) including:  itching, nausea, vomiting, profound fatigue, altered sleep, confusion/mental fog, decreased urine output, or trouble with fluid (increased edema or shortness breath).    Have labs in 1 month.    Follow-up with Dr. Basilio in 2 months.               Follow-ups after your visit        Follow-up notes from your care team     Return in about 2 months (around 7/24/2018).      Your next 10 appointments already scheduled     May 24, 2018  4:30 PM CDT   (Arrive by 4:15 PM)   Return Visit with Eleazar Pack DPM   [facility] ([facility] [place])    [address] [place] [address]  [address] [facility] [id number] [phone]            Jun 13, 2018   Procedure with Barry Morel MD   [facility] [place] ([facility])    [address] [facility] [id number] [phone]           The [facility] is located on the corner of [address] and [address] on the [facility]. It is easily accessible from virtually any point in the [place] area, via [address].            Jun 20, 2018 12:30 PM CDT   LAB with  LAB    [facility] ([facility])    [address]  [address] [facility] [id number] [phone]           Please do not eat 10-12 hours before your appointment if you are  coming in fasting for labs on lipids, cholesterol, or glucose (sugar). This does not apply to pregnant women. Water, hot tea and black coffee (with nothing added) are okay. Do not drink other fluids, diet soda or chew gum.            Jun 20, 2018  1:00 PM CDT   (Arrive by 12:45 PM)   NEW LUNG NODULE with Silvino Hooker MD   [facility] ([facility])    [address]  [address]  [facility] [id number] [phone]            Jun 22, 2018 11:00 AM CDT   (Arrive by 10:45 AM)   New Patient Visit with Sandy Morse MD   [facility] ([facility])    [address]  3rd Floor  [facility] [id number] [phone]            Jul 18, 2018  3:15 PM CDT   (Arrive by 2:45 PM)   Return Visit with Kathryn Basilio MD   [facility] ([facility])    [address]  [address]  [facility] [address] [phone]              Who to contact     If you have questions or need follow up information about today's clinic visit or your schedule please contact [facility] directly at [phone].  Normal or non-critical lab and imaging results will be communicated to you by MyChart, letter or phone within 4 business days after the clinic has received the results. If you do not hear from us within 7 days, please contact the clinic through MyChart or phone. If you have a critical or abnormal lab result, we will notify you by phone as soon as possible.  Submit refill requests through [employer] or call your pharmacy and they will forward the refill request to us. Please allow 3 business days for your refill to be completed.          Additional Information About Your Visit        [employer] Information     [employer] lets you send messages to your doctor, view your test results, renew your prescriptions, schedule appointments and more. To sign up, go to [email or web address] . Click on  "\"Log in\" on the left side of the screen, which will take you to the Welcome page. Then click on \"Sign up Now\" on the right side of the page.     You will be asked to enter the access code listed below, as well as some personal information. Please follow the directions to create your username and password.     Your access code is: [email or web address]  Expires: 2018  4:04 PM     Your access code will  in 90 days. If you need help or a new code, please call your [facility] clinic or [phone].        Care EveryWhere ID     This is your Care EveryWhere ID. This could be used by other organizations to access your [facility] medical records  [id number]        Your Vitals Were     Pulse Height Pulse Oximetry BMI (Body Mass Index)          54 1.651 m (5' 5\") 98% 32.58 kg/m2         Blood Pressure from Last 3 Encounters:   18 171/73   18 169/53   18 168/45    Weight from Last 3 Encounters:   18 88.8 kg (195 lb 12.8 oz)   18 92.6 kg (204 lb 3.2 oz)   18 88.5 kg (195 lb)              Today, you had the following     No orders found for display       Primary Care Provider Office Phone # Fax #    Noam Luis Jackson [phone]       [address] [facility] [address]  [facility] [address]        Equal Access to Services     [facility] AH: Hadii aad ku hadasho Soomaali, waaxda luqadaha, qaybta kaalmada adeegyada, madeline sood . So [facility] [phone].    ATENCIÓN: Si habla español, tiene a santoro disposición servicios gratuitos de asistencia lingüística. Llame al [phone].    We comply with applicable federal civil rights laws and [place] laws. We do not discriminate on the basis of race, color, national origin, age, disability, sex, sexual orientation, or gender identity.            Thank you!     Thank you for choosing [facility]  for your care. Our goal is always to provide you with excellent care. Hearing back from our patients is one way " we can continue to improve our services. Please take a few minutes to complete the written survey that you may receive in the mail after your visit with us. Thank you!             Your Updated Medication List - Protect others around you: Learn how to safely use, store and throw away your medicines at www.disposemymeds.org.          This list is accurate as of 5/24/18  4:07 PM.  Always use your most recent med list.                   Brand Name Dispense Instructions for use Diagnosis    ACE/ARB/ARNI NOT PRESCRIBED (INTENTIONAL)      Please choose reason not prescribed, below    CKD (chronic kidney disease) stage 5, GFR less than 15 ml/min (H)       albuterol 108 (90 Base) MCG/ACT Inhaler    PROAIR HFA/PROVENTIL HFA/VENTOLIN HFA    3 Inhaler    Inhale 2 puffs into the lungs every 6 hours as needed for shortness of breath / dyspnea or wheezing    Cough       amoxicillin-clavulanate 500-125 MG per tablet    AUGMENTIN    14 tablet    Take 1 tablet by mouth 2 times daily    Cellulitis of foot, right       atorvastatin 10 MG tablet    LIPITOR    30 tablet    Take 1 tablet (10 mg) by mouth daily    Hyperlipidemia LDL goal <100       * blood glucose monitoring test strip    ONETOUCH ULTRA    200 strip    Use to test blood sugars 2 times daily or as directed.    Type 2 diabetes mellitus with stage 3 chronic kidney disease, without long-term current use of insulin (H)       * blood glucose monitoring test strip    ONETOUCH ULTRA    200 strip    Test your blood sugar 3-4 times per day.    Type 2 diabetes mellitus with hyperglycemia, with long-term current use of insulin (H)       CALCITRIOL PO      Take by mouth daily Unsure of dosage        carvedilol 12.5 MG tablet    COREG    60 tablet    Take 1 tablet (12.5 mg) by mouth 2 times daily (with meals)    Essential hypertension with goal blood pressure less than 140/90       * furosemide 40 MG tablet    LASIX    30 tablet    Take 1 tablet (40 mg) by mouth daily    Lymphedema of  both lower extremities       * furosemide 20 MG tablet    LASIX    30 tablet    Take 1 tablet (20 mg) by mouth At Bedtime    Lymphedema of both lower extremities, Essential hypertension with goal blood pressure less than 140/90       hydrocortisone 1 % cream    CORTAID    60 g    Apply sparingly to affected area two times daily for 14 days.    Intertrigo       insulin glargine 100 UNIT/ML injection    LANTUS    3 mL    Inject 20 Units Subcutaneous At Bedtime    Type 2 diabetes mellitus with diabetic neuropathy, with long-term current use of insulin (H)       insulin pen needle 31G X 6 MM    ULTICARE MINI    100 each    Use daily or as directed.    Type 2 diabetes, HbA1c goal < 7% (H)       miconazole 2 % powder    MICATIN; MICRO GUARD    71 g    Apply topically 2 times daily    Fungal dermatitis       MULTIVITAMIN PO      1 tablet by mouth daily        NovoLOG FLEXPEN 100 UNIT/ML injection   Generic drug:  insulin aspart     15 mL    Inject 4 units SQ with breakfast, lunch and dinner.    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    Edema of lower extremity        order for DME     1 Device    1 SAD light    Seasonal affective disorder (H)       triamcinolone 0.025 % ointment    KENALOG    80 g    Apply to sites of dermatitis under the breasts, chest, buttocks - once a day.    Dermatitis       Urea 20 % Crea cream     85 g    Apply topically daily    Xerosis cutis, Tyloma       * Notice:  This list has 7 medication(s) that are the same as other medications prescribed for you. Read the directions carefully, and ask your doctor or other care provider to review them with you.

## 2018-05-24 NOTE — PROGRESS NOTES
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 Unremarkable.         IMPRESSION:  1. Thin, echogenic renal cortices consistent with medical renal  disease.  2. No hydronephrosis.    Sara Martin PA-C

## 2018-05-24 NOTE — LETTER
5/24/2018       RE: Supriya Herr  [address]  [facility] [address]     Dear Colleague,    Thank you for referring your patient, Supriya Herr, to the [facility] WOUND [facility] at [facility]. Please see a copy of my visit note below.    Chief Complaints and History of Present Illnesses   Patient presents with     WOUND CARE     Right foot            Allergies   Allergen Reactions     Nkda [No Known Drug Allergies]          Subjective: Supriya is a 68 year old female who presents to the clinic today for a follow up of right foot ulceration.  She presents with her daughter today.  She relates that since last visit, she did go to [place].  During her stay there, she noted that she had quite a bit of redness and swelling on the bottom of the foot.  She did come back to be admitted at [facility] and it was noted that there was cellulitis in the foot.  She was discharged with antibiotics, which she is still taking.  She relates that the area is still very tender and painful for her to walk on.  She relates that the redness has decreased some.  No nausea, vomiting, diarrhea, fever, chills, night sweats, shortness of breath, chest pain.      Objective  Data Unavailable Data Unavailable Data Unavailable Data Unavailable Data Unavailable 0 lbs 0 oz  Attention was directed to the plantar aspect of the first interspace on the right foot.  There is seem to be a small pocket of purulence noted.  I sharply debrided this and I evacuated about 15 cc of purulent drainage that was an abscess in the foot.  As I debrided the hyperkeratotic skin, and noted a tunneling lesion as described below:  A wound is noted at right  plantar foot measuring 1mm x 1mm x 5mm.    Cardenas Classification: 2    Wound base: Not Visible    Edges: hyperkeratotic    Drainage: moderate/serosanguinous and purulent    Odor: no    Undermining: no    Bone Exposure: No    Clinical Signs of Infection: Yes: Locally, there was an  abscess that was completely evacuated.     After obtaining patient consent, the wound was irrigated with copious amounts of saline. A scalpel was then used to debride the wound into the dermal tissue. The wound edges were debrided to healthy, bleeding tissue. Given the patient's lack of sensation, no anesthesia was necessary for the procedure.       Assessment: Right foot abscess.  She is status post admission for cellulitis of the same area.  She is currently on antibiotics.  Now that the abscess has been drained in the foot, I think that she will do much better.  The remaining erythema around the area of abscess should be able to drain.      Plan:   - Pt seen and evaluated  - Wound was debrided and abscess was drained as directed.  This was thoroughly lavaged with saline today.  I did not find any further evidence of infection. She is currently on antibiotics.  - Area was covered with Silver PolyMem and a DSD. This should be changed daily.   - Pt to return to clinic in 5 days to assess healing. Go to ED if s/s of infection return, and these signs were reviewed with them.       Again, thank you for allowing me to participate in the care of your patient.      Sincerely,    Eleazar Pack DPM

## 2018-05-24 NOTE — MR AVS SNAPSHOT
After Visit Summary   5/24/2018    Supriya Herr    MRN: [id number]           Patient Information     Date Of Birth          1950        Visit Information        Provider Department      5/24/2018 4:30 PM Eleazar Pack DPM [facility]        Today's Diagnoses     Type 2 diabetes mellitus with stage 4 chronic kidney disease, with long-term current use of insulin (H)    -  1    Type 2 diabetes mellitus with diabetic neuropathy, with long-term current use of insulin (H)        Diabetic ulcer of right midfoot associated with type 2 diabetes mellitus, with fat layer exposed (H)           Follow-ups after your visit        Your next 10 appointments already scheduled     May 29, 2018  4:30 PM CDT   (Arrive by 4:15 PM)   RETURN FOOT/ANKLE with FRAN Lorenzo [facility] ([facility])    [address] [place] [address]  4th Floor  [facility] [address] [phone]            Jun 13, 2018   Procedure with Barry Morel MD   [facility] ([facility])    [address] [address] [phone]           The [facility] is located on the corner of [address] and [address] on the [facility] [address] [facility]. It is easily accessible from virtually any point [place], via [address].            Jun 20, 2018 12:30 PM CDT   LAB with  LAB    Health Lab ([facility])    [address] [place] [address]  1st [facility] [address] [phone]           Please do not eat 10-12 hours before your appointment if you are coming in fasting for labs on lipids, cholesterol, or glucose (sugar). This does not apply to pregnant women. Water, hot tea and black coffee (with nothing added) are okay. Do not drink other fluids, diet soda or chew gum.            Jun 20, 2018  1:00 PM CDT   (Arrive by 12:45 PM)    NEW LUNG NODULE with Silvino Hooker MD   [facility] ([facility])    [address]  [address]  [facility] [id number] [phone]            2018 11:00 AM CDT   (Arrive by 10:45 AM)   New Patient Visit with Sandy Morse MD   [facility] ([facility])    [address]  3rd Floor  [facility] [id number] [phone]            2018  3:15 PM CDT   (Arrive by 2:45 PM)   Return Visit with Kathryn Basilio MD   [facility] ([facility])    [address]  [address]  [facility] [id number] [phone]              Who to contact     Please call your clinic at [phone] to:    Ask questions about your health    Make or cancel appointments    Discuss your medicines    Learn about your test results    Speak to your doctor            Additional Information About Your Visit        Active [employer] Information     C[employer]verse is an electronic gateway that provides easy, online access to your medical records. With Curoverse, you can request a clinic appointment, read your test results, renew a prescription or communicate with your care team.     To sign up for C[employer] visit the website at [email or web address]   You will be asked to enter the access code listed below, as well as some personal information. Please follow the directions to create your username and password.     Your access code is: [email or web address]  Expires: 2018  4:04 PM     Your access code will  in 90 days. If you need help or a new code, please contact your [facility] or call [phone] for assistance.        Care EveryWhere ID     This is your Care EveryWhere ID. This could be used by other organizations to access your [place] medical records  [id number]         Blood Pressure from Last 3 Encounters:   18 171/73   18 169/53   18  168/45    Weight from Last 3 Encounters:   05/24/18 195 lb 12.8 oz   05/21/18 204 lb 3.2 oz   04/27/18 195 lb              We Performed the Following     DEBRIDEMENT WOUND UP TO 20 SQ CM        Primary Care Provider Office Phone # Fax #    Noam Luis Jackson [phone]       [address] [facility] [address]  [facility] [address]        Equal Access to Services     BLAIR [facility] Center[facility] : Hadii aad ku hadasho Soomaali, waaxda luqadaha, qaybta kaalmada adeegyada, waxay idiin hayaan adeeg khevresh lajagdeep . So [facility] [phone].    ATENCIÓN: Si mariyala espbernardo, tiene a santoro disposición servicios gratuitos de asistencia lingüística. [facility] [phone].    We comply with applicable federal civil rights laws and [place] laws. We do not discriminate on the basis of race, color, national origin, age, disability, sex, sexual orientation, or gender identity.            Thank you!     Thank you for choosing [facility] W[facility]  for your care. Our goal is always to provide you with excellent care. Hearing back from our patients is one way we can continue to improve our services. Please take a few minutes to complete the written survey that you may receive in the mail after your visit with us. Thank you!             Your Updated Medication List - Protect others around you: Learn how to safely use, store and throw away your medicines at www.disposemymeds.org.          This list is accurate as of 5/24/18 11:59 PM.  Always use your most recent med list.                   Brand Name Dispense Instructions for use Diagnosis    ACE/ARB/ARNI NOT PRESCRIBED (INTENTIONAL)      Please choose reason not prescribed, below    CKD (chronic kidney disease) stage 5, GFR less than 15 ml/min (H)       albuterol 108 (90 Base) MCG/ACT Inhaler    PROAIR HFA/PROVENTIL HFA/VENTOLIN HFA    3 Inhaler    Inhale 2 puffs into the lungs every 6 hours as needed for shortness of breath / dyspnea or wheezing    Cough       amoxicillin-clavulanate 500-125  MG per tablet    AUGMENTIN    14 tablet    Take 1 tablet by mouth 2 times daily    Cellulitis of foot, right       atorvastatin 10 MG tablet    LIPITOR    30 tablet    Take 1 tablet (10 mg) by mouth daily    Hyperlipidemia LDL goal <100       * blood glucose monitoring test strip    ONETOUCH ULTRA    200 strip    Use to test blood sugars 2 times daily or as directed.    Type 2 diabetes mellitus with stage 3 chronic kidney disease, without long-term current use of insulin (H)       * blood glucose monitoring test strip    ONETOUCH ULTRA    200 strip    Test your blood sugar 3-4 times per day.    Type 2 diabetes mellitus with hyperglycemia, with long-term current use of insulin (H)       CALCITRIOL PO      Take by mouth daily Unsure of dosage        carvedilol 12.5 MG tablet    COREG    60 tablet    Take 1 tablet (12.5 mg) by mouth 2 times daily (with meals)    Essential hypertension with goal blood pressure less than 140/90       * furosemide 40 MG tablet    LASIX    30 tablet    Take 1 tablet (40 mg) by mouth daily    Lymphedema of both lower extremities       * furosemide 20 MG tablet    LASIX    30 tablet    Take 1 tablet (20 mg) by mouth At Bedtime    Lymphedema of both lower extremities, Essential hypertension with goal blood pressure less than 140/90       hydrocortisone 1 % cream    CORTAID    60 g    Apply sparingly to affected area two times daily for 14 days.    Intertrigo       insulin glargine 100 UNIT/ML injection    LANTUS    3 mL    Inject 20 Units Subcutaneous At Bedtime    Type 2 diabetes mellitus with diabetic neuropathy, with long-term current use of insulin (H)       insulin pen needle 31G X 6 MM    ULTICARE MINI    100 each    Use daily or as directed.    Type 2 diabetes, HbA1c goal < 7% (H)       miconazole 2 % powder    MICATIN; MICRO GUARD    71 g    Apply topically 2 times daily    Fungal dermatitis       MULTIVITAMIN PO      1 tablet by mouth daily        NovoLOG FLEXPEN 100 UNIT/ML  injection   Generic drug:  insulin aspart     15 mL    Inject 4 units SQ with breakfast, lunch and dinner.    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    Edema of lower extremity       order for DME     1 Device    1 SAD light    Seasonal affective disorder (H)       triamcinolone 0.025 % ointment    KENALOG    80 g    Apply to sites of dermatitis under the breasts, chest, buttocks - once a day.    Dermatitis       Urea 20 % Crea cream     85 g    Apply topically daily    Xerosis cutis, Tyloma       * Notice:  This list has 7 medication(s) that are the same as other medications prescribed for you. Read the directions carefully, and ask your doctor or other care provider to review them with you.

## 2018-05-24 NOTE — PATIENT INSTRUCTIONS
Check blood pressures at home if above 140/90 or less than 110/50.  If remain high, we will need to increase the blood pressure medicine.  Call tomorrow and will follow-up in one week as well.    Monitor for signs of uremia (accumulation of toxins from reduced kidney function) including:  itching, nausea, vomiting, profound fatigue, altered sleep, confusion/mental fog, decreased urine output, or trouble with fluid (increased edema or shortness breath).    Have labs in 1 month.    Follow-up with Dr. Basilio in 2 months.

## 2018-05-24 NOTE — LETTER
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     Bladder: Unremarkable.         IMPRESSION:  1. Thin, echogenic renal cortices consistent with medical renal  disease.  2. No hydronephrosis.    Sara Martin PA-C

## 2018-05-24 NOTE — NURSING NOTE
"Chief Complaint   Patient presents with     RECHECK     CKD stage 5  follow up     /73  Pulse 54  Ht 1.651 m (5' 5\")  Wt 88.8 kg (195 lb 12.8 oz)  SpO2 98%  BMI 32.58 kg/m2    SEVERINO WRIGHT CMA    "

## 2018-05-25 ENCOUNTER — CARE COORDINATION (OUTPATIENT)
Dept: NEPHROLOGY | Facility: CLINIC | Age: 68
End: 2018-05-25

## 2018-05-25 PROBLEM — L97.512: Status: ACTIVE | Noted: 2018-05-25

## 2018-05-25 LAB
BACTERIA SPEC CULT: NO GROWTH
BACTERIA SPEC CULT: NO GROWTH
DEPRECATED CALCIDIOL+CALCIFEROL SERPL-MC: 23 UG/L (ref 20–75)
Lab: NORMAL
Lab: NORMAL
SPECIMEN SOURCE: NORMAL
SPECIMEN SOURCE: NORMAL

## 2018-05-25 NOTE — PROGRESS NOTES
Chief Complaints and History of Present Illnesses   Patient presents with     WOUND CARE     Right foot            Allergies   Allergen Reactions     Nkda [No Known Drug Allergies]          Subjective: Supriya is a 68 year old female who presents to the clinic today for a follow up of right foot ulceration.  She presents with her daughter today.  She relates that since last visit, she did go to [place].  During her stay there, she noted that she had quite a bit of redness and swelling on the bottom of the foot.  She did come back to be admitted at [facility] and it was noted that there was cellulitis in the foot.  She was discharged with antibiotics, which she is still taking.  She relates that the area is still very tender and painful for her to walk on.  She relates that the redness has decreased some.  No nausea, vomiting, diarrhea, fever, chills, night sweats, shortness of breath, chest pain.      Objective  Data Unavailable Data Unavailable Data Unavailable Data Unavailable Data Unavailable 0 lbs 0 oz  Attention was directed to the plantar aspect of the first interspace on the right foot.  There is seem to be a small pocket of purulence noted.  I sharply debrided this and I evacuated about 15 cc of purulent drainage that was an abscess in the foot.  As I debrided the hyperkeratotic skin, and noted a tunneling lesion as described below:  A wound is noted at right  plantar foot measuring 1mm x 1mm x 5mm.    Cardenas Classification: 2    Wound base: Not Visible    Edges: hyperkeratotic    Drainage: moderate/serosanguinous and purulent    Odor: no    Undermining: no    Bone Exposure: No    Clinical Signs of Infection: Yes: Locally, there was an abscess that was completely evacuated.     After obtaining patient consent, the wound was irrigated with copious amounts of saline. A scalpel was then used to debride the wound into the dermal tissue. The wound edges were debrided to healthy, bleeding tissue. Given the patient's  lack of sensation, no anesthesia was necessary for the procedure.       Assessment: Right foot abscess.  She is status post admission for cellulitis of the same area.  She is currently on antibiotics.  Now that the abscess has been drained in the foot, I think that she will do much better.  The remaining erythema around the area of abscess should be able to drain.      Plan:   - Pt seen and evaluated  - Wound was debrided and abscess was drained as directed.  This was thoroughly lavaged with saline today.  I did not find any further evidence of infection. She is currently on antibiotics.  - Area was covered with Silver PolyMem and a DSD. This should be changed daily.   - Pt to return to clinic in 5 days to assess healing. Go to ED if s/s of infection return, and these signs were reviewed with them.

## 2018-05-25 NOTE — PROGRESS NOTES
Reason for Call    Patient's daughter, Caroline, called with an update on patient's home BP readings. Did not have readings at appointment yesterday, so wanted to update Sara.     162/71, 173/70, 148/63, 169/72, 166/65, 162/70, 166/80, 142/65, 159/57, 154/64.     BPs are running higher. Caroline says patient has not been following sodium restriction as well as she was a couple months ago (eating out/ fast food more often). They will work on this.     Update sent to MARY Banuelos for further recommendations.     Pravin Cordova, RN

## 2018-05-25 NOTE — PROGRESS NOTES
Per Sara:    Let's have her really try to restrict the sodium as she once was for next week and then let us know. Her daughter tells me that when she restricts sodium as instructed, her mom's BPs really drop. So I do not want to over treat.  Most of those are 150s-160s systolic, if they remain at this level after sodium restriction, we can increase.     Returned call to patient's daughter, Caroline, and relayed these recommendations. She verbalized understanding. She will help patient watch her sodium intake as she has previously. Caroline will be out of town next week, but will have patient continue to monitor BP at home. She'll plan to call me on 6/4 with an update.    Pravin Cordova RN

## 2018-05-26 DIAGNOSIS — E55.9 VITAMIN D DEFICIENCY: ICD-10-CM

## 2018-05-29 ENCOUNTER — RADIANT APPOINTMENT (OUTPATIENT)
Dept: CT IMAGING | Facility: CLINIC | Age: 68
End: 2018-05-29
Attending: PODIATRIST
Payer: MEDICARE

## 2018-05-29 ENCOUNTER — OFFICE VISIT (OUTPATIENT)
Dept: ORTHOPEDICS | Facility: CLINIC | Age: 68
End: 2018-05-29
Payer: MEDICARE

## 2018-05-29 DIAGNOSIS — L97.412 DIABETIC ULCER OF RIGHT MIDFOOT ASSOCIATED WITH TYPE 2 DIABETES MELLITUS, WITH FAT LAYER EXPOSED (H): ICD-10-CM

## 2018-05-29 DIAGNOSIS — E11.621 DIABETIC ULCER OF RIGHT MIDFOOT ASSOCIATED WITH TYPE 2 DIABETES MELLITUS, WITH FAT LAYER EXPOSED (H): ICD-10-CM

## 2018-05-29 DIAGNOSIS — L97.412 DIABETIC ULCER OF RIGHT MIDFOOT ASSOCIATED WITH TYPE 2 DIABETES MELLITUS, WITH FAT LAYER EXPOSED (H): Primary | ICD-10-CM

## 2018-05-29 DIAGNOSIS — E11.621 DIABETIC ULCER OF RIGHT MIDFOOT ASSOCIATED WITH TYPE 2 DIABETES MELLITUS, WITH FAT LAYER EXPOSED (H): Primary | ICD-10-CM

## 2018-05-29 RX ORDER — CALCITRIOL 0.25 UG/1
CAPSULE, LIQUID FILLED ORAL
Qty: 30 CAPSULE | Refills: 3 | Status: SHIPPED | OUTPATIENT
Start: 2018-05-29 | End: 2018-07-27

## 2018-05-29 NOTE — NURSING NOTE
Reason For Visit:   Chief Complaint   Patient presents with     RECHECK     Wound, right foot.        Pain Assessment  Patient Currently in Pain: Other (Comment) (Pt stated that her right foot itches. )          Current Outpatient Prescriptions   Medication Sig Dispense Refill     ACE/ARB/ARNI NOT PRESCRIBED, INTENTIONAL, Please choose reason not prescribed, below       albuterol (PROAIR HFA, PROVENTIL HFA, VENTOLIN HFA) 108 (90 BASE) MCG/ACT inhaler Inhale 2 puffs into the lungs every 6 hours as needed for shortness of breath / dyspnea or wheezing 3 Inhaler 1     amoxicillin-clavulanate (AUGMENTIN) 500-125 MG per tablet Take 1 tablet by mouth 2 times daily 14 tablet 0     atorvastatin (LIPITOR) 10 MG tablet Take 1 tablet (10 mg) by mouth daily 30 tablet      blood glucose monitoring (ONE TOUCH ULTRA) test strip Use to test blood sugars 2 times daily or as directed. 200 strip 3     blood glucose monitoring (ONETOUCH ULTRA) test strip Test your blood sugar 3-4 times per day. 200 strip 3     calcitRIOL (ROCALTROL) 0.25 MCG capsule TAKE 1 CAPSULE (0.25 MCG) BY MOUTH DAILY 30 capsule 3     CALCITRIOL PO Take by mouth daily Unsure of dosage       carvedilol (COREG) 12.5 MG tablet Take 1 tablet (12.5 mg) by mouth 2 times daily (with meals) 60 tablet 11     furosemide (LASIX) 20 MG tablet Take 1 tablet (20 mg) by mouth At Bedtime 30 tablet 3     furosemide (LASIX) 40 MG tablet Take 1 tablet (40 mg) by mouth daily 30 tablet 11     hydrocortisone (CORTAID) 1 % cream Apply sparingly to affected area two times daily for 14 days. 60 g 3     insulin glargine (LANTUS) 100 UNIT/ML injection Inject 20 Units Subcutaneous At Bedtime 3 mL 1     insulin pen needle (ULTICARE MINI) 31G X 6 MM Use daily or as directed. 100 each prn     miconazole (MICATIN; MICRO GUARD) 2 % powder Apply topically 2 times daily 71 g 0     MULTIVITAMIN OR 1 tablet by mouth daily       NOVOLOG FLEXPEN 100 UNIT/ML soln Inject 4 units SQ with breakfast, lunch  and dinner. 15 mL 3     order for DME 1 SAD light 1 Device 1     order for DME Equipment being ordered: Right Lower extremity Solaris Ready wrap calf piece:  Size small/length tall , knee piece: Size small , Thigh piece size small/length average. 1 Units 0     order for DME 1 wheelchair 1 Device 0     order for DME Equipment being ordered: TEDS stocking   Below the knee 15-20 mg  Dispense 2  Use daily 2 Device 1     ORDER FOR DME Equipment being ordered: Compression stockings, 20-30 MMHG, knee high 1 Device 0     triamcinolone (KENALOG) 0.025 % ointment Apply to sites of dermatitis under the breasts, chest, buttocks - once a day. 80 g 1     Urea 20 % CREA cream Apply topically daily 85 g 3          Allergies   Allergen Reactions     Nkda [No Known Drug Allergies]

## 2018-05-29 NOTE — PROGRESS NOTES
Chief Complaint:   Chief Complaint   Patient presents with     RECHECK     Wound, right foot.           Allergies   Allergen Reactions     Nkda [No Known Drug Allergies]          Subjective: Supriya is a 68 year old female who presents to the clinic today for a follow up of right foot wound.  She presents today with her daughter.  She relates that the pain on the bottom of the foot has come back.  She relates that it is painful when she is walking.  Her daughter tells me that she thought she saw little pus pocket noted underneath the skin.  She continue to address this as directed.    Objective  Attention was directed to the plantar aspect of the first interspace on the right foot.  There is a small pocket of purulence noted.  I sharply debrided this and I evacuated about 5 cc's of purulent drainage. As I debrided the hyperkeratotic skin, and noted a tunneling lesion as described below:  A wound is noted at right  plantar foot measuring 1mm x 1mm x 3mm.     Cardenas Classification: 2     Wound base: Not Visible     Edges: hyperkeratotic     Drainage: moderate/serosanguinous and purulent     Odor: no     Undermining: no     Bone Exposure: No     Clinical Signs of Infection: Yes: Locally, there was an abscess that was completely evacuated.      After obtaining patient consent, the wound was irrigated with copious amounts of saline. A scalpel was then used to debride the wound into the dermal tissue. The wound edges were debrided to healthy, bleeding tissue. Given the patient's lack of sensation, no anesthesia was necessary for the procedure.         Assessment: Right foot abscess.  She is status post admission for cellulitis of the same area.  She is currently on antibiotics.       Plan:   - Pt seen and evaluated  -I again drained a small abscess from the area.  In total, there was about 5 cc of purulent drainage.  Because this is the second time of this happening, I am concerned that there might be a deeper abscess.   However, the redness around the area has decreased since last visit.  I think will be prudent at this point to get an MRI to see if there are any deep abscesses that need to be incised and drained.  - Area was covered with Betadine and a dry, sterile dressing today.  This should be changed every day.  - Pt to return to clinic 2 days s/p MRI. Go to ED if s/s of infection return, and these signs were reviewed with them.     - MRI not available for over a week. Will get CT for now.

## 2018-05-29 NOTE — LETTER
5/29/2018       RE: Supriya Herr  [address]  [facility] [address]     Dear Colleague,    Thank you for referring your patient, Supriya Herr, to the [facility] at [facility]. Please see a copy of my visit note below.    Chief Complaint:   Chief Complaint   Patient presents with     RECHECK     Wound, right foot.           Allergies   Allergen Reactions     Nkda [No Known Drug Allergies]          Subjective: Supriya is a 68 year old female who presents to the clinic today for a follow up of right foot wound.  She presents today with her daughter.  She relates that the pain on the bottom of the foot has come back.  She relates that it is painful when she is walking.  Her daughter tells me that she thought she saw little pus pocket noted underneath the skin.  She continue to address this as directed.    Objective  Attention was directed to the plantar aspect of the first interspace on the right foot.  There is a small pocket of purulence noted.  I sharply debrided this and I evacuated about 5 cc's of purulent drainage. As I debrided the hyperkeratotic skin, and noted a tunneling lesion as described below:  A wound is noted at right  plantar foot measuring 1mm x 1mm x 3mm.     Cardenas Classification: 2     Wound base: Not Visible     Edges: hyperkeratotic     Drainage: moderate/serosanguinous and purulent     Odor: no     Undermining: no     Bone Exposure: No     Clinical Signs of Infection: Yes: Locally, there was an abscess that was completely evacuated.      After obtaining patient consent, the wound was irrigated with copious amounts of saline. A scalpel was then used to debride the wound into the dermal tissue. The wound edges were debrided to healthy, bleeding tissue. Given the patient's lack of sensation, no anesthesia was necessary for the procedure.         Assessment: Right foot abscess.  She is status post admission for cellulitis of the  same area.  She is currently on antibiotics.       Plan:   - Pt seen and evaluated  -I again drained a small abscess from the area.  In total, there was about 5 cc of purulent drainage.  Because this is the second time of this happening, I am concerned that there might be a deeper abscess.  However, the redness around the area has decreased since last visit.  I think will be prudent at this point to get an MRI to see if there are any deep abscesses that need to be incised and drained.  - Area was covered with Betadine and a dry, sterile dressing today.  This should be changed every day.  - Pt to return to clinic 2 days s/p MRI. Go to ED if s/s of infection return, and these signs were reviewed with them.     - MRI not available for over a week. Will get CT for now.     Again, thank you for allowing me to participate in the care of your patient.      Sincerely,    Eleazar Pack DPM

## 2018-05-29 NOTE — MR AVS SNAPSHOT
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 Also bring a list of your medicines, including vitamins, minerals and over-the-counter drugs. It is safest to leave personal items at home.  Be sure to tell your doctor:   If you have any allergies.   If there s any chance you are pregnant.   If you are breastfeeding.  You do not need to do anything special to prepare for this exam.  Please wear loose clothing, such as a sweat suit or jogging clothes. Avoid snaps, zippers and other metal. We may ask you to undress and put on a hospital gown.            Jun 08, 2018  1:45 PM CDT   MR FOOT RIGHT W/O CONTRAST with [id number]   [facility] MRI ([facility])    [address] [id number] [phone]           Take your medicines as usual, unless your doctor tells you not to. Bring a list of your current medicines to your exam (including vitamins, minerals and over-the-counter drugs). Also bring the results of similar scans you may have had.  Please remove any body piercings and hair extensions before you arrive.  Follow your doctor s orders. If you do not, we may have to postpone your exam.  You may or  may not receive IV contrast for this exam pending the discretion of the Radiologist.  You do not need to do anything special to prepare.  The MRI machine uses a strong magnet. Please wear clothes without metal (snaps, zippers). A sweatsuit works well, or we may give you a hospital gown.   **IMPORTANT** THE INSTRUCTIONS BELOW ARE ONLY FOR THOSE PATIENTS WHO HAVE BEEN PRESCRIBED SEDATION OR GENERAL ANESTHESIA DURING THEIR MRI PROCEDURE:  IF YOUR DOCTOR PRESCRIBED ORAL SEDATION (take medicine to help you relax during your exam):   You must get the medicine from your doctor (oral medication) before you arrive. Bring the medicine to the exam. Do not take it at home. You ll be told when to take it upon arriving for your exam.   Arrive one hour early. Bring someone who can take you home after the test. Your medicine will make you sleepy. After the exam, you may not drive, take a bus or take a taxi by yourself.  IF YOUR DOCTOR PRESCRIBED IV SEDATION:   Arrive one hour early. Bring someone who can take you home after the test. Your medicine will make you sleepy. After the exam, you may not drive, take a bus or take a taxi by yourself.   No eating 6 hours before your exam. You may have clear liquids up until 4 hours before your exam. (Clear liquids include water, clear tea, black coffee and fruit juice without pulp.)  IF YOUR DOCTOR PRESCRIBED ANESTHESIA (be asleep for your exam):   Arrive 1 1/2 hours early. Bring someone who can take you home after the test. You may not drive, take a bus or take a taxi by yourself.   No eating 8 hours before your exam. You may have clear liquids up until 4 hours before your exam. (Clear liquids include water, clear tea, black coffee and fruit juice without pulp.)   You will spend four to five hours in the recovery room.  Please call the Imaging Department at your exam site with any questions.            Jun 13, 2018   Procedure with Barry Morel MD   [facility], [place], [facility]  ([facility], [facility])    [address] [address]   [phone]           [facility] is located on the corner of [address] and [address] on the [address] [facility]. It is easily accessible from virtually any point in the [place] area, via [address] and [address].            Jun 20, 2018 12:30 PM CDT   LAB with  LAB   [facility] ([facility])    [address] [place] [address]  1st Floor  [facility] [id number] [phone]           Please do not eat 10-12 hours before your appointment if you are coming in fasting for labs on lipids, cholesterol, or glucose (sugar). This does not apply to pregnant women. Water, hot tea and black coffee (with nothing added) are okay. Do not drink other fluids, diet soda or chew gum.            Jun 20, 2018  1:00 PM CDT   (Arrive by 12:45 PM)   NEW LUNG NODULE with Silvino Hooker MD   [facility] ([facility])    [address] [place] [address]  [address]  [facility] [place] [address] [phone]            Jun 22, 2018 11:00 AM CDT   (Arrive by 10:45 AM)   New Patient Visit with Sandy Morse MD   [facility] Dermatology ([facility])    [address] [place] [address]  3rd Floor  [facility] [place] [address] [phone]            Jul 18, 2018  3:15 PM CDT   (Arrive by 2:45 PM)   Return Visit with Kathryn Basilio MD   [facility] Nephrology ([facility])    [address] [place] [address]  [address]  [facility] [place] [address] [phone]              Future tests that were ordered for you today     Open Future Orders        Priority Expected Expires Ordered    CT Foot Left w/o Contrast Routine  5/29/2019 5/29/2018    MR Foot Right w/o Contrast Routine  5/29/2019 5/29/2018            Who to contact     Please call your clinic at [phone] to:    Ask questions about your health    Make or cancel  appointments    Discuss your medicines    Learn about your test results    Speak to your doctor            Additional Information About Your Visit        MyChart Information     codetaghart is an electronic gateway that provides easy, online access to your medical records. With codetaghart, you can request a clinic appointment, read your test results, renew a prescription or communicate with your care team.     To sign up for [email or web address] visit the website at [email or web address]   You will be asked to enter the access code listed below, as well as some personal information. Please follow the directions to create your username and password.     Your access code is: [email or web address]  Expires: 2018  4:04 PM     Your access code will  in 90 days. If you need help or a new code, please contact your [facility] or call [phone] for assistance.        Care EveryWhere ID     This is your Care EveryWhere ID. This could be used by other organizations to access your [place] medical records  [id number]         Blood Pressure from Last 3 Encounters:   18 171/73   18 169/53   18 168/45    Weight from Last 3 Encounters:   18 88.8 kg (195 lb 12.8 oz)   18 92.6 kg (204 lb 3.2 oz)   18 88.5 kg (195 lb)              We Performed the Following     DEBRIDEMENT WOUND UP TO 20 SQ CM        Primary Care Provider Office Phone # Fax #    Noam Luis Jackson [phone]       [address] [facility] [address]  [facility] [place] [facility] [address]        Equal Access to Services     KALYANI WARREN : Hadii aad ku hadasho Soomaali, waaxda luqadaha, qaybta kaalmada adeegyada, madeline sood . So [facility] [phone].    ATENCIÓN: Si habla español, tiene a santoro disposición servicios gratuitos de asistencia lingüística. Llame al [phone].    We comply with applicable federal civil rights laws and [place] laws. We do not discriminate on the basis of race,  color, national origin, age, disability, sex, sexual orientation, or gender identity.            Thank you!     Thank you for choosing [facility]  for your care. Our goal is always to provide you with excellent care. Hearing back from our patients is one way we can continue to improve our services. Please take a few minutes to complete the written survey that you may receive in the mail after your visit with us. Thank you!             Your Updated Medication List - Protect others around you: Learn how to safely use, store and throw away your medicines at www.disposemymeds.org.          This list is accurate as of 5/29/18  5:28 PM.  Always use your most recent med list.                   Brand Name Dispense Instructions for use Diagnosis    ACE/ARB/ARNI NOT PRESCRIBED (INTENTIONAL)      Please choose reason not prescribed, below    CKD (chronic kidney disease) stage 5, GFR less than 15 ml/min (H)       albuterol 108 (90 Base) MCG/ACT Inhaler    PROAIR HFA/PROVENTIL HFA/VENTOLIN HFA    3 Inhaler    Inhale 2 puffs into the lungs every 6 hours as needed for shortness of breath / dyspnea or wheezing    Cough       amoxicillin-clavulanate 500-125 MG per tablet    AUGMENTIN    14 tablet    Take 1 tablet by mouth 2 times daily    Cellulitis of foot, right       atorvastatin 10 MG tablet    LIPITOR    30 tablet    Take 1 tablet (10 mg) by mouth daily    Hyperlipidemia LDL goal <100       * blood glucose monitoring test strip    ONETOUCH ULTRA    200 strip    Use to test blood sugars 2 times daily or as directed.    Type 2 diabetes mellitus with stage 3 chronic kidney disease, without long-term current use of insulin (H)       * blood glucose monitoring test strip    ONETOUCH ULTRA    200 strip    Test your blood sugar 3-4 times per day.    Type 2 diabetes mellitus with hyperglycemia, with long-term current use of insulin (H)       * CALCITRIOL PO      Take by mouth daily Unsure of dosage        * calcitRIOL  0.25 MCG capsule    ROCALTROL    30 capsule    TAKE 1 CAPSULE (0.25 MCG) BY MOUTH DAILY    Vitamin D deficiency       carvedilol 12.5 MG tablet    COREG    60 tablet    Take 1 tablet (12.5 mg) by mouth 2 times daily (with meals)    Essential hypertension with goal blood pressure less than 140/90       * furosemide 40 MG tablet    LASIX    30 tablet    Take 1 tablet (40 mg) by mouth daily    Lymphedema of both lower extremities       * furosemide 20 MG tablet    LASIX    30 tablet    Take 1 tablet (20 mg) by mouth At Bedtime    Lymphedema of both lower extremities, Essential hypertension with goal blood pressure less than 140/90       hydrocortisone 1 % cream    CORTAID    60 g    Apply sparingly to affected area two times daily for 14 days.    Intertrigo       insulin glargine 100 UNIT/ML injection    LANTUS    3 mL    Inject 20 Units Subcutaneous At Bedtime    Type 2 diabetes mellitus with diabetic neuropathy, with long-term current use of insulin (H)       insulin pen needle 31G X 6 MM    ULTICARE MINI    100 each    Use daily or as directed.    Type 2 diabetes, HbA1c goal < 7% (H)       miconazole 2 % powder    MICATIN; MICRO GUARD    71 g    Apply topically 2 times daily    Fungal dermatitis       MULTIVITAMIN PO      1 tablet by mouth daily        NovoLOG FLEXPEN 100 UNIT/ML injection   Generic drug:  insulin aspart     15 mL    Inject 4 units SQ with breakfast, lunch and dinner.    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    Edema of lower extremity       order for DME     1 Device    1 SAD light    Seasonal affective disorder (H)       triamcinolone 0.025 % ointment    KENALOG    80 g    Apply to sites of dermatitis under the breasts, chest, buttocks - once a day.    Dermatitis       Urea 20 % Crea cream     85 g    Apply topically daily    Xerosis cutis, Tyloma       * Notice:  This list has 9 medication(s) that are the same as other medications prescribed for you. Read the directions carefully, and ask your doctor or other care provider to review them with you.

## 2018-05-29 NOTE — TELEPHONE ENCOUNTER
Last Office Visit with Nephrologist:  5/24/2018.  Medication refilled per Nephrology Clinic protocol.    Pravin Cordova RN

## 2018-05-31 ENCOUNTER — TELEPHONE (OUTPATIENT)
Dept: ORTHOPEDICS | Facility: CLINIC | Age: 68
End: 2018-05-31

## 2018-05-31 NOTE — TELEPHONE ENCOUNTER
Health Call Center    Phone Message    May a detailed message be left on voicemail: yes    Reason for Call: Requesting Results   Name/type of test: CT SCAN  Date of test: 5.29.18  Was test done at a location other than [facility] (Please fill in the location if not [facility])?: No      Action Taken: Message routed to:  [facility] ([facility] ortho

## 2018-06-05 ENCOUNTER — TELEPHONE (OUTPATIENT)
Dept: GASTROENTEROLOGY | Facility: CLINIC | Age: 68
End: 2018-06-05

## 2018-06-05 DIAGNOSIS — Z12.11 ENCOUNTER FOR SCREENING COLONOSCOPY: Primary | ICD-10-CM

## 2018-06-05 NOTE — TELEPHONE ENCOUNTER
Patient scheduled for colonoscopy     Indication for procedure. Screening for malignant neoplasms     Referring Provider. Noam Jackson MD    ? No     Arrival time verified? Patient to arrive at 1030 am     Facility location verified? [address]     Instructions given regarding prep and procedure. Prep reviewed; RN answered all questions. Transportation policy reviewed and verbalized understanding.     Prep Type Golytely.     Are you taking any anticoagulants or blood thinners? Denies     Instructions given? Yes     Electronic implanted devices? Denies     Pre procedure teaching completed? Yes    Transportation from procedure? Yes, daughter or sister     H&P / Pre op physical completed? N/A    Girish Cruz RN

## 2018-06-06 ENCOUNTER — CARE COORDINATION (OUTPATIENT)
Dept: NEPHROLOGY | Facility: CLINIC | Age: 68
End: 2018-06-06

## 2018-06-06 DIAGNOSIS — I89.0 LYMPHEDEMA OF BOTH LOWER EXTREMITIES: ICD-10-CM

## 2018-06-06 NOTE — PROGRESS NOTES
Reason for Call    Left  for patient's daughter, Caroline, to return call to follow up on patient's home BP readings. She's had higher readings in clinic recently, but agreed to try to monitor sodium intake more closely again as patient's BPs responded well to this previously.   Will await call back. If BPs still elevated, will review with Dr. Basilio for further recommendations.     Pravin Cordova RN

## 2018-06-06 NOTE — PROGRESS NOTES
Patient's daughter returned call and relayed the following recent home BP readings: 169/72, 147/63, 173/70, 166/80, 139/67, 178/64, 151/67, 183/79, 163/74, 145/69, 160/73, 173/77. Reports edema is well controlled and weight is stable. Currently taking lasix 40/20 and carvedilol 12.5 mg BID. Will update Dr. Basilio for recommendations.     Pravin Cordova RN

## 2018-06-07 RX ORDER — FUROSEMIDE 40 MG
40 TABLET ORAL EVERY MORNING
Qty: 30 TABLET | Refills: 11 | COMMUNITY
Start: 2018-06-07 | End: 2018-10-01

## 2018-06-07 NOTE — PROGRESS NOTES
Per Dr. Basilio:    Let's have her go to 40/40mg on furosemide   Touch base in 1-2 weeks, and ? If heart rate not <70, can increase coreg if further need     Called patient's daughter and reviewed recommendations. She verbalized understanding. They will monitor BP and heart rate and update me next week.    Pravin Cordova, RN, BAN  Nephrology RN Care Coordinator

## 2018-06-08 ENCOUNTER — RADIANT APPOINTMENT (OUTPATIENT)
Dept: MRI IMAGING | Facility: CLINIC | Age: 68
End: 2018-06-08
Attending: PODIATRIST
Payer: MEDICARE

## 2018-06-08 DIAGNOSIS — E11.621 DIABETIC ULCER OF RIGHT MIDFOOT ASSOCIATED WITH TYPE 2 DIABETES MELLITUS, WITH FAT LAYER EXPOSED (H): ICD-10-CM

## 2018-06-08 DIAGNOSIS — L97.412 DIABETIC ULCER OF RIGHT MIDFOOT ASSOCIATED WITH TYPE 2 DIABETES MELLITUS, WITH FAT LAYER EXPOSED (H): ICD-10-CM

## 2018-06-12 ENCOUNTER — TELEPHONE (OUTPATIENT)
Dept: FAMILY MEDICINE | Facility: CLINIC | Age: 68
End: 2018-06-12

## 2018-06-12 NOTE — TELEPHONE ENCOUNTER
Spoke with pt, she will hold the insulin as directed until after her procedure    Viridiana Sprague RN   [facility]

## 2018-06-12 NOTE — TELEPHONE ENCOUNTER
Dr. Jackson    See pt medication question    Advise    Thank you  Viridiana Celestine RN   [facility]

## 2018-06-12 NOTE — TELEPHONE ENCOUNTER
She has type 2 DIABETES MELLITUS so should not take her insulin before the procedure as she will be fasting( I assume)

## 2018-06-12 NOTE — TELEPHONE ENCOUNTER
Reason for call:  Other   Patient called regarding (reason for call): call back  Additional comments: The patient would like a call back from Dr. Jackson if possible. She has a colonoscopy tomorrow and is wondering if she can still take her diabetes medication before the procedure. She would like a call back to discuss this.    Phone number to reach patient:  Home number on file [phone] (home)    Best Time:  Any    Can we leave a detailed message on this number?  YES

## 2018-06-13 ENCOUNTER — HOSPITAL ENCOUNTER (OUTPATIENT)
Facility: CLINIC | Age: 68
Discharge: HOME | End: 2018-06-13
Attending: INTERNAL MEDICINE | Admitting: INTERNAL MEDICINE
Payer: MEDICARE

## 2018-06-13 VITALS
DIASTOLIC BLOOD PRESSURE: 56 MMHG | SYSTOLIC BLOOD PRESSURE: 146 MMHG | OXYGEN SATURATION: 97 % | RESPIRATION RATE: 6 BRPM

## 2018-06-13 LAB
GLUCOSE BLDC GLUCOMTR-MCNC: 63 MG/DL (ref 70–99)
GLUCOSE BLDC GLUCOMTR-MCNC: 83 MG/DL (ref 70–99)

## 2018-06-13 PROCEDURE — G0500 MOD SEDAT ENDO SERVICE >5YRS: HCPCS | Performed by: INTERNAL MEDICINE

## 2018-06-13 PROCEDURE — 82962 GLUCOSE BLOOD TEST: CPT | Mod: 91

## 2018-06-13 PROCEDURE — 45378 DIAGNOSTIC COLONOSCOPY: CPT | Performed by: INTERNAL MEDICINE

## 2018-06-13 PROCEDURE — 99153 MOD SED SAME PHYS/QHP EA: CPT | Performed by: INTERNAL MEDICINE

## 2018-06-13 PROCEDURE — 25000128 H RX IP 250 OP 636: Performed by: INTERNAL MEDICINE

## 2018-06-13 PROCEDURE — 88305 TISSUE EXAM BY PATHOLOGIST: CPT | Performed by: INTERNAL MEDICINE

## 2018-06-13 PROCEDURE — 45385 COLONOSCOPY W/LESION REMOVAL: CPT | Performed by: INTERNAL MEDICINE

## 2018-06-13 PROCEDURE — 25800025 ZZH RX 258: Performed by: INTERNAL MEDICINE

## 2018-06-13 RX ORDER — FENTANYL CITRATE 50 UG/ML
INJECTION, SOLUTION INTRAMUSCULAR; INTRAVENOUS PRN
Status: DISCONTINUED | OUTPATIENT
Start: 2018-06-13 | End: 2018-06-13 | Stop reason: HOSPADM

## 2018-06-13 RX ADMIN — DEXTROSE AND SODIUM CHLORIDE: 5; 450 INJECTION, SOLUTION INTRAVENOUS at 11:30

## 2018-06-13 NOTE — IP AVS SNAPSHOT
[facility], [place], Endoscopy    [address] [facility] [facility] [address] [address]    Phone:  [phone]                                       After Visit Summary   6/13/2018    Supriya Herr    MRN: [id number]           After Visit Summary Signature Page     I have received my discharge instructions, and my questions have been answered. I have discussed any challenges I see with this plan with the nurse or doctor.    ..........................................................................................................................................  Patient/Patient Representative Signature      ..........................................................................................................................................  Patient Representative Print Name and Relationship to Patient    ..................................................               ................................................  Date                                            Time    ..........................................................................................................................................  Reviewed by Signature/Title    ...................................................              ..............................................  Date                                                            Time

## 2018-06-13 NOTE — IP AVS SNAPSHOT
MRN:[id number]                      After Visit Summary   6/13/2018    Supriya Herr    MRN: [id number]           Thank you!     Thank you for choosing [facility] for your care. Our goal is always to provide you with excellent care. Hearing back from our patients is one way we can continue to improve our services. Please take a few minutes to complete the written survey that you may receive in the mail after you visit with us. Thank you!        Patient Information     Date Of Birth          1950        About your hospital stay     You were admitted on:  June 13, 2018 You last received care in the:  [facility], Endoscopy    You were discharged on:  June 13, 2018       Who to Call     For medical emergencies, please call 911.  For non-urgent questions about your medical care, please call your primary care provider or clinic, [phone]  For questions related to your surgery, please call your surgery clinic        Attending Provider     Provider Barry Donohue MD Gastroenterology       Primary Care Provider Office Phone # Fax #    Noam Luis Jackson [phone]      Your next 10 appointments already scheduled     Jun 14, 2018 10:30 AM CDT   (Arrive by 10:15 AM)   Return Visit with Eleazar Pack DPM   [facility] ([facility])    [address] [place] [id number] [phone]            Jun 22, 2018 10:00 AM CDT   LAB with  LAB   [facility] ([facility])    [address] [place] [id number] [phone]           Please do not eat 10-12 hours before your appointment if you are coming in fasting for labs on lipids, cholesterol, or glucose (sugar). This does not apply to pregnant women. Water, hot tea and black coffee (with nothing added) are okay. Do not drink other fluids, diet soda or chew gum.            Jun 22, 2018 11:00 AM  CDT   (Arrive by 10:45 AM)   Return Visit with Sandy Morse MD   [facility] ([facility])    [address]  3rd Floor  [facility] [id number] [phone]            Jul 10, 2018 11:00 AM CDT   (Arrive by 10:45 AM)   NEW LUNG NODULE with Ravin King MD   [facility] ([facility])    [address]  [address]  [facility] [id number] [phone]            Jul 18, 2018  3:15 PM CDT   (Arrive by 2:45 PM)   Return Visit with Kathryn Basilio MD   [facility] ([facility])    [address]  [address]  [facility] [id number] [phone]              Further instructions from your care team       Discharge Instructions after Colonoscopy  or Sigmoidoscopy    Today you had a Colonoscopy    Activity and Diet  You were given medicine for pain. You may be dizzy or sleepy.  For 24 hours:    Do not drive or use heavy equipment.    Do not make important decisions.    Do not drink any alcohol.  You may return to your normal diet and medicines.    Discomfort    Air was placed in your colon during the exam in order to see it. Walking helps to pass the air.    You may take Tylenol (acetaminophen) for pain unless your doctor has told you not to.  Do not take aspirin or ibuprofen (Advil, Motrin, or other anti-inflammatory  drugs) for 4 days.    Follow-up    We took small tissue samples or polyps to study. Your doctor will call you with the results  within two weeks.    When to call:    Call right away if you have:    Unusual pain in belly or chest pain not relieved with passing air.    More than 1 to 2 Tablespoons of bleeding from your rectum.    Fever above 100.6  F (37.5  C).    If you have severe pain, bleeding, or shortness of breath, go to an emergency room.    If you have questions, call:  Monday to Friday, 7 a.m. to 4:30 p.m.  Endoscopy: [phone] (We may have to call  you back)    After hours  Hospital: [phone] (Ask for the GI fellow on call)    Pending Results     No orders found from 6/11/2018 to 6/14/2018.            Admission Information     Date & Time Provider Department Dept. Phone    6/13/2018 Barry Morel MD [facility], [place], Endoscopy [phone]      Your Vitals Were     Blood Pressure Respirations Pulse Oximetry             151/55 6 98%         Care EveryWhere ID     This is your Care EveryWhere ID. This could be used by other organizations to access your [place] medical records  [id number]        Equal Access to Services     [facility]: Hadii aad ku hadasho Soomaali, waaxda luqadaha, qaybta kaalmada adetereyashell, madeline sood . So [facility] [phone].    ATENCIÓN: Si habla español, tiene a santoro disposición servicios gratuitos de asistencia lingüística. [facility] [phone].    We comply with applicable federal civil rights laws and [place] laws. We do not discriminate on the basis of race, color, national origin, age, disability, sex, sexual orientation, or gender identity.               Review of your medicines      UNREVIEWED medicines. Ask your doctor about these medicines        Dose / Directions    ACE/ARB/ARNI NOT PRESCRIBED (INTENTIONAL)   Used for:  CKD (chronic kidney disease) stage 5, GFR less than 15 ml/min (H)        Please choose reason not prescribed, below   Refills:  0       albuterol 108 (90 Base) MCG/ACT Inhaler   Commonly known as:  PROAIR HFA/PROVENTIL HFA/VENTOLIN HFA   Used for:  Cough        Dose:  2 puff   Inhale 2 puffs into the lungs every 6 hours as needed for shortness of breath / dyspnea or wheezing   Quantity:  3 Inhaler   Refills:  1       amoxicillin-clavulanate 500-125 MG per tablet   Commonly known as:  AUGMENTIN   Indication:  Infection of the Skin and/or Related Soft Tissue   Used for:  Cellulitis of foot, right        Dose:  1 tablet   Take 1 tablet by mouth 2 times daily   Quantity:  14  tablet   Refills:  0       atorvastatin 10 MG tablet   Commonly known as:  LIPITOR   Used for:  Hyperlipidemia LDL goal <100        Dose:  10 mg   Take 1 tablet (10 mg) by mouth daily   Quantity:  30 tablet   Refills:  0       * CALCITRIOL PO        Take by mouth daily Unsure of dosage   Refills:  0       * calcitRIOL 0.25 MCG capsule   Commonly known as:  ROCALTROL   Used for:  Vitamin D deficiency        TAKE 1 CAPSULE (0.25 MCG) BY MOUTH DAILY   Quantity:  30 capsule   Refills:  3       carvedilol 12.5 MG tablet   Commonly known as:  COREG   Used for:  Essential hypertension with goal blood pressure less than 140/90        Dose:  12.5 mg   Take 1 tablet (12.5 mg) by mouth 2 times daily (with meals)   Quantity:  60 tablet   Refills:  11       furosemide 40 MG tablet   Commonly known as:  LASIX   Used for:  Lymphedema of both lower extremities        Dose:  40 mg   Take 1 tablet (40 mg) by mouth 2 times daily   Quantity:  30 tablet   Refills:  11       hydrocortisone 1 % cream   Commonly known as:  CORTAID   Used for:  Intertrigo        Apply sparingly to affected area two times daily for 14 days.   Quantity:  60 g   Refills:  3       insulin glargine 100 UNIT/ML injection   Commonly known as:  LANTUS   Used for:  Type 2 diabetes mellitus with diabetic neuropathy, with long-term current use of insulin (H)        Dose:  20 Units   Inject 20 Units Subcutaneous At Bedtime   Quantity:  3 mL   Refills:  1       miconazole 2 % powder   Commonly known as:  MICATIN; MICRO GUARD   Used for:  Fungal dermatitis        Apply topically 2 times daily   Quantity:  71 g   Refills:  0       MULTIVITAMIN PO        1 tablet by mouth daily   Refills:  0       NovoLOG FLEXPEN 100 UNIT/ML injection   Used for:  Type 2 diabetes mellitus with hyperglycemia, with long-term current use of insulin (H)   Generic drug:  insulin aspart        Inject 4 units SQ with breakfast, lunch and dinner.   Quantity:  15 mL   Refills:  3        triamcinolone 0.025 % ointment   Commonly known as:  KENALOG   Used for:  Dermatitis        Apply to sites of dermatitis under the breasts, chest, buttocks - once a day.   Quantity:  80 g   Refills:  1       Urea 20 % Crea cream   Used for:  Xerosis cutis, Tyloma        Apply topically daily   Quantity:  85 g   Refills:  3       * Notice:  This list has 2 medication(s) that are the same as other medications prescribed for you. Read the directions carefully, and ask your doctor or other care provider to review them with you.      CONTINUE these medicines which have NOT CHANGED        Dose / Directions    * blood glucose monitoring test strip   Commonly known as:  ONETOUCH ULTRA   Used for:  Type 2 diabetes mellitus with stage 3 chronic kidney disease, without long-term current use of insulin (H)        Use to test blood sugars 2 times daily or as directed.   Quantity:  200 strip   Refills:  3       * blood glucose monitoring test strip   Commonly known as:  ONETOUCH ULTRA   Used for:  Type 2 diabetes mellitus with hyperglycemia, with long-term current use of insulin (H)        Test your blood sugar 3-4 times per day.   Quantity:  200 strip   Refills:  3       insulin pen needle 31G X 6 MM   Commonly known as:  ULTICARE MINI   Used for:  Type 2 diabetes, HbA1c goal < 7% (H)        Use daily or as directed.   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   Quantity:  1 Units   Refills:  0       order for DME   Used for:  Seasonal affective disorder (H)        1 SAD light   Quantity:  1 Device   Refills:  1       * Notice:  This list has 5 medication(s) that are the same as other medications prescribed for you. Read the directions carefully, and ask your doctor or other care provider to review them with you.             Protect others around you: Learn how to safely use, store and throw away your medicines at [email or web address].             Medication List: This is a list of all your medications and when to take them. Check marks below indicate your daily home schedule. Keep this list as a reference.      Medications           Morning Afternoon Evening Bedtime As Needed    ACE/ARB/ARNI NOT PRESCRIBED (INTENTIONAL)   Please choose reason not prescribed, below                                albuterol 108 (90 Base) MCG/ACT Inhaler   Commonly known as:  PROAIR HFA/PROVENTIL HFA/VENTOLIN HFA   Inhale 2 puffs into the lungs every 6 hours as needed for shortness of breath / dyspnea or wheezing                                amoxicillin-clavulanate 500-125 MG per tablet   Commonly known as:  AUGMENTIN   Take 1 tablet by mouth 2 times daily                                atorvastatin 10 MG tablet   Commonly known as:  LIPITOR   Take 1 tablet (10 mg) by mouth daily                                * blood glucose monitoring test strip   Commonly known as:  ONETOUCH ULTRA   Use to test blood sugars 2 times daily or as directed.                                * blood glucose monitoring test  strip   Commonly known as:  ONETOUCH ULTRA   Test your blood sugar 3-4 times per day.                                * CALCITRIOL PO   Take by mouth daily Unsure of dosage                                * calcitRIOL 0.25 MCG capsule   Commonly known as:  ROCALTROL   TAKE 1 CAPSULE (0.25 MCG) BY MOUTH DAILY                                carvedilol 12.5 MG tablet   Commonly known as:  COREG   Take 1 tablet (12.5 mg) by mouth 2 times daily (with meals)                                furosemide 40 MG tablet   Commonly known as:  LASIX   Take 1 tablet (40 mg) by mouth 2 times daily                                hydrocortisone 1 % cream   Commonly known as:  CORTAID   Apply sparingly to affected area two times daily for 14 days.                                insulin glargine 100 UNIT/ML injection   Commonly known as:  LANTUS   Inject 20 Units Subcutaneous At Bedtime                                insulin pen needle 31G X 6 MM   Commonly known as:  ULTICARE MINI   Use daily or as directed.                                miconazole 2 % powder   Commonly known as:  MICATIN; MICRO GUARD   Apply topically 2 times daily                                MULTIVITAMIN PO   1 tablet by mouth daily                                NovoLOG FLEXPEN 100 UNIT/ML injection   Inject 4 units SQ with breakfast, lunch and dinner.   Generic drug:  insulin aspart                                order for DME   Equipment being ordered: Compression stockings, 20-30 MMHG, knee high                                * order for DME   Equipment being ordered: TEDS stocking  Below the knee 15-20 mg Dispense 2 Use daily                                * order for DME   1 wheelchair                                * order for DME   Equipment being ordered: Right Lower extremity Solaris Ready wrap calf piece:  Size small/length tall , knee piece: Size small , Thigh piece size small/length average.                                order for DME   1 SAD light                                 triamcinolone 0.025 % ointment   Commonly known as:  KENALOG   Apply to sites of dermatitis under the breasts, chest, buttocks - once a day.                                Urea 20 % Crea cream   Apply topically daily                                * Notice:  This list has 7 medication(s) that are the same as other medications prescribed for you. Read the directions carefully, and ask your doctor or other care provider to review them with you.

## 2018-06-13 NOTE — OR NURSING
Pt tolerated colonoscopy well with minimal discomfort. 7 small polyps removed with cold snare. VSS.

## 2018-06-13 NOTE — DISCHARGE INSTRUCTIONS
Discharge Instructions after Colonoscopy  or Sigmoidoscopy    Today you had a Colonoscopy    Activity and Diet  You were given medicine for pain. You may be dizzy or sleepy.  For 24 hours:    Do not drive or use heavy equipment.    Do not make important decisions.    Do not drink any alcohol.  You may return to your normal diet and medicines.    Discomfort    Air was placed in your colon during the exam in order to see it. Walking helps to pass the air.    You may take Tylenol (acetaminophen) for pain unless your doctor has told you not to.  Do not take aspirin or ibuprofen (Advil, Motrin, or other anti-inflammatory  drugs) for 4 days.    Follow-up    We took small tissue samples or polyps to study. Your doctor will call you with the results  within two weeks.    When to call:    Call right away if you have:    Unusual pain in belly or chest pain not relieved with passing air.    More than 1 to 2 Tablespoons of bleeding from your rectum.    Fever above 100.6  F (37.5  C).    If you have severe pain, bleeding, or shortness of breath, go to an emergency room.    If you have questions, call:  Monday to Friday, 7 a.m. to 4:30 p.m.  Endoscopy: [phone] (We may have to call you back)    After hours  Hospital: [phone] (Ask for the GI fellow on call)

## 2018-06-13 NOTE — LETTER
Patient:  Brooke Herr  :   1950  MRN:     [id number]        Ms.Joanne TATIANA Herr  [address] [facility] [facility] [address]        Phyllis 15, 2018    Dear ,    We are writing to inform you of your test results. It was a pleasure to meet you at the time of your recent colonoscopy examination. As you may recall, several small polyps were removed during that examination. Our pathologist has examined these polyps and determined that two were a type called an adenomatous polyps. The others were unimportant hyperplastic polyps.    Adenomatous polyps are polyps which, if not removed, could potentially grow and turn to cancer. Your polyps were all completely removed. There was no evidence of cancer in any of the polyps. Given that you have had an adenomatous polyp, follow-up colonoscopy examination is recommended on a regular basis to check for any new polyps. I would recommend that you have a repeat colonoscopy in 5 years.    Please feel free to call if you have any questions about this letter. I can be reached at [phone].    LACEY Morel MD  Associate Professor of Medicine  Division of Gastroenterology, Hepatology and Nutrition  [facility]      Resulted Orders   Surgical pathology exam   Result Value Ref Range    Copath Report       Patient Name: BROOKE HERR  MR#: [id number]  Specimen #: [id number]  Collected: 2018  Received: 2018  Reported: 2018 13:01  Ordering Phy(s): TEOFILO MOREL    For improved result formatting, select 'View Enhanced Report Format' under   Linked Documents section.    SPECIMEN(S):  A: Colon polyp, ascending  B: Colon polyp, transverse  C: Sigmoid colon polyp  D: Sigmoid colon polyp    FINAL DIAGNOSIS:  A: Large intestine, right/ascending colon, polypectomy  - Tubular adenoma  - No evidence of high-grade dysplasia or invasive malignancy    B: Large intestine, transverse colon, polypectomy  - One tubular adenoma and one sessile  "serrated adenoma  - No evidence of high-grade dysplasia or invasive malignancy  - See comment    C: Large intestine, sigmoid colon, polypectomy x4  - Hyperplastic polyp x2  - No evidence of neoplastic polyp or malignancy    D: Large intestine, rectosigmoid colon, polypectomy  - 2 fragments of hyperplastic polyp  - No evidence of neoplastic polyp  or malignancy    COMMENT:  The specimen from the transverse colon contains multiple fragments of   tissue. 2 of these demonstrate sessile  serrated adenoma without cytological dysplasia and there is a third   fragment which appears to be a tubular  adenoma. Although there is no cytological dysplasia in direct continuity   with the serrated adenoma fragments,  if this tissue all represents a single polyp, then I cannot exclude the   possibility of a sessile serrated  adenoma with cytological dysplasia.    I have personally reviewed all specimens and/or slides, including the   listed special stains, and used them  with my medical judgement to determine or confirm the final diagnosis.    Electronically signed out by:    Homer Boyd M.D., Physicians    CLINICAL HISTORY:  Screening colon  GROSS:  A: The specimen is received in formalin with proper patient   identification, labeled \"ascending polyp\".  The  specimen consists of three pink-tan soft tissue fragments 0.3-0.5 cm in   greate st dimension.  The specimen is  placed in sponges and entirely submitted in cassette 1.    B: The specimen is received in formalin with proper patient   identification, labeled \"transverse polyp\".  The  specimen consists of five pink-tan soft tissue fragments 0.2-0.4 cm in   greatest dimension.  The specimen is  placed in sponges and entirely submitted in cassette 1.    C: The specimen is received in formalin with proper patient   identification, labeled \"four sigmoid polyps\".  The specimen consists of three pink-tan soft tissue fragments 0.5-1.6 cm   in greatest dimension.  The specimen  lid " "and container are exhaustive examined and three fragments are   identified.  The specimen is placed in  sponges and entirely submitted in cassette 1.    D: The specimen is received in formalin with proper patient   identification, labeled \"rectosigmoid polyp\".  The  specimen consists of two pink-tan soft tissue fragments 0.5-0.7 cm in   greatest dimension.  The specimen is  placed in sponges and e ntirely submitted in cassette 1. (Dictated by:   Kiara Valente [facility] 6/13/2018 03:44 PM)    MICROSCOPIC:  A formal microscopic examination was performed.    CPT Codes:  A: 12008-RU3  B: 66039-MS2  C: 29533-YK4  D: 71010-SE1    TESTING LAB LOCATION:  [facility], [address]   [address] [phone]    COLLECTION SITE:  Client: [facility]  Location: [place])                         "

## 2018-06-14 ENCOUNTER — OFFICE VISIT (OUTPATIENT)
Dept: WOUND CARE | Facility: CLINIC | Age: 68
End: 2018-06-14
Payer: MEDICARE

## 2018-06-14 ENCOUNTER — TELEPHONE (OUTPATIENT)
Dept: WOUND CARE | Facility: CLINIC | Age: 68
End: 2018-06-14

## 2018-06-14 DIAGNOSIS — M79.89 SOFT TISSUE MASS: Primary | ICD-10-CM

## 2018-06-14 LAB
COLONOSCOPY: NORMAL
COPATH REPORT: NORMAL

## 2018-06-14 PROCEDURE — 88305 TISSUE EXAM BY PATHOLOGIST: CPT | Performed by: PODIATRIST

## 2018-06-14 RX ORDER — FUROSEMIDE 20 MG
TABLET ORAL
Refills: 3 | COMMUNITY
Start: 2018-05-19 | End: 2018-06-14 | Stop reason: DRUGHIGH

## 2018-06-14 ASSESSMENT — PAIN SCALES - GENERAL: PAINLEVEL: NO PAIN (0)

## 2018-06-14 NOTE — TELEPHONE ENCOUNTER
Health Call Center    Phone Message    May a detailed message be left on voicemail: yes    Reason for Call: Other: Pt came in to see Dr. Pack today. Bandage is covered in blood already. Wondering if that's normal bc they were told not to change bandage until Friday. Please call pt's daughter ASAP to see what they should do.      Action Taken: Message routed to:  [facility] ([facility] Wound

## 2018-06-14 NOTE — LETTER
6/14/2018       RE: Supriya Herr  [address]  [facility] [address]     Dear Colleague,    Thank you for referring your patient, Supriya Herr, to the [facility] WOUND CARE at [facility]. Please see a copy of my visit note below.    Chief Complaints and History of Present Illnesses   Patient presents with     WOUND CARE     pt here for wound care            Allergies   Allergen Reactions     Nkda [No Known Drug Allergies]          Subjective: Supriya is a 68 year old female who presents to the clinic today for a follow up of right foot ulceration. She presents with her daughter today. She relates that the foot is still painful for her. They have not seen any pus drainage coming from the area. MRI was performed.     No n/v/d/f/c/ns/sob/cp    Objective    MRI:   IMPRESSION:  1. 15 x 7 x 14 mm plantar subcutaneous complicated fluid/cystic mass  under the second metatarsal head, nonspecific with differential  consideration include abscess in appropriate clinical setting.  2. No evidence of osteomyelitis.  3. Extensive bony deformities including multiple areas of  fusion/ankylosis, as well as dislocation of the second tarsometatarsal  joint.     KIMBERLEE ROMAN    Images of the removed soft tissue and resultant wound in media tab.   The foot had a callused area on the plantar 2nd met head. With shaving, purulent drainage was noted. I palpated the area and more purulence was noted. Because of this and the MRI findings, I discussed with Supriya about having an I&D. I spoke to her about the risks, complications, benefits, alternatives, and answered all of her questions. Consent was signed. The below data was obtained s/p I&D. I administered 10cc of 1% lidocaine plain in a Puente block for anesthesia after skin prep. Foot was prepped and draped in the usual aseptic manner.   A wound is noted at right  1st interspace measuring 1cm x 1cm x 0.4cm. With exploration, the cystic mass  was noted to be free from any underlying attachment or vascular connections. I removed this today and sent it to the lab for path. This roughly matched the dimensions as described in the MRI.     Cardenas Classification: 2    Wound base: Red/Granulation    Edges: intact    Drainage: scant and moderate/serosanguinous and purulent respectively     Odor: no    Undermining: no    Cystic mass was removed from the ulcer.     Bone Exposure: No    Clinical Signs of Infection: No    After obtaining patient consent, the wound was irrigated with copious amounts of saline. A scalpel was then used to debride the wound into the subcutaneous tissue. The wound edges were debrided to healthy, bleeding tissue. Mass was sent to path.       Assessment: Cystic mass in the right foot under the met head, corresponding to the info in the MRI. This was removed. I suspect that this has been the nidus of infection for some time. I am now hopeful that the area will be able to heal and remain purulent free.     Plan:   - Pt seen and evaluated  - I&D was performed as described. Area was covered with betadine-soaked Adaptic and a DSD. This should be left intact until Saturday and changed then. Her daughter relates that she can perform this.   - Low GFR. She should use extra strength Tylenol for pain control.  - Pt to return to clinic in 4 days for reassessment.       Again, thank you for allowing me to participate in the care of your patient.      Sincerely,    Eleazar Pack DPM

## 2018-06-14 NOTE — NURSING NOTE
Chief Complaint   Patient presents with     WOUND CARE     pt here for wound care       There were no vitals filed for this visit.    There is no height or weight on file to calculate BMI.      GILBERT Hooper, EMT                  No vitals taken per provider

## 2018-06-14 NOTE — PROGRESS NOTES
Chief Complaints and History of Present Illnesses   Patient presents with     WOUND CARE     pt here for wound care            Allergies   Allergen Reactions     Nkda [No Known Drug Allergies]          Subjective: Supriya is a 68 year old female who presents to the clinic today for a follow up of right foot ulceration. She presents with her daughter today. She relates that the foot is still painful for her. They have not seen any pus drainage coming from the area. MRI was performed.     No n/v/d/f/c/ns/sob/cp    Objective    MRI:   IMPRESSION:  1. 15 x 7 x 14 mm plantar subcutaneous complicated fluid/cystic mass  under the second metatarsal head, nonspecific with differential  consideration include abscess in appropriate clinical setting.  2. No evidence of osteomyelitis.  3. Extensive bony deformities including multiple areas of  fusion/ankylosis, as well as dislocation of the second tarsometatarsal  joint.     KIMBERLEE ROMAN    Images of the removed soft tissue and resultant wound in media tab.   The foot had a callused area on the plantar 2nd met head. With shaving, purulent drainage was noted. I palpated the area and more purulence was noted. Because of this and the MRI findings, I discussed with Supriya about having an I&D. I spoke to her about the risks, complications, benefits, alternatives, and answered all of her questions. Consent was signed. The below data was obtained s/p I&D. I administered 10cc of 1% lidocaine plain in a Puente block for anesthesia after skin prep. Foot was prepped and draped in the usual aseptic manner.   A wound is noted at right  1st interspace measuring 1cm x 1cm x 0.4cm. With exploration, the cystic mass was noted to be free from any underlying attachment or vascular connections. I removed this today and sent it to the lab for path. This roughly matched the dimensions as described in the MRI.     Cardenas Classification: 2    Wound base: Red/Granulation    Edges: intact    Drainage:  scant and moderate/serosanguinous and purulent respectively     Odor: no    Undermining: no    Cystic mass was removed from the ulcer.     Bone Exposure: No    Clinical Signs of Infection: No    After obtaining patient consent, the wound was irrigated with copious amounts of saline. A scalpel was then used to debride the wound into the subcutaneous tissue. The wound edges were debrided to healthy, bleeding tissue. Mass was sent to path.       Assessment: Cystic mass in the right foot under the met head, corresponding to the info in the MRI. This was removed. I suspect that this has been the nidus of infection for some time. I am now hopeful that the area will be able to heal and remain purulent free.     Plan:   - Pt seen and evaluated  - I&D was performed as described. Area was covered with betadine-soaked Adaptic and a DSD. This should be left intact until Saturday and changed then. Her daughter relates that she can perform this.   - Low GFR. She should use extra strength Tylenol for pain control.  - Pt to return to clinic in 4 days for reassessment.      English

## 2018-06-14 NOTE — MR AVS SNAPSHOT
After Visit Summary   6/14/2018    Supriya Herr    MRN: [id number]           Patient Information     Date Of Birth          1950        Visit Information        Provider Department      6/14/2018 10:30 AM Eleazar Pack DPM [facility] Wound Care        Today's Diagnoses     Soft tissue mass    -  1       Follow-ups after your visit        Your next 10 appointments already scheduled     Jun 18, 2018  9:20 AM CDT   (Arrive by 9:05 AM)   RETURN FOOT/ANKLE with Eleazar Pack DPM   [facility] Orthopaedic Clinic ([facility])    [address] [place] [address]  4th Floor  [facility] [id number] [phone]            Jun 22, 2018 10:00 AM CDT   LAB with  LAB   [facility] Lab [facility])    [address] [place] [address]  1st Floor  [facility] [id number] [phone]           Please do not eat 10-12 hours before your appointment if you are coming in fasting for labs on lipids, cholesterol, or glucose (sugar). This does not apply to pregnant women. Water, hot tea and black coffee (with nothing added) are okay. Do not drink other fluids, diet soda or chew gum.            Jun 22, 2018 11:00 AM CDT   (Arrive by 10:45 AM)   Return Visit with Sandy Morse MD   [facility] Dermatology ([facility])    [address] [place] [address]  3rd Floor  [facility] [id number] [phone]            Jul 10, 2018 11:00 AM CDT   (Arrive by 10:45 AM)   NEW LUNG NODULE with Ravin King MD   [facility] ([facility])    [address] [place] [address]  [address]  [facility] [id number] [phone]            Jul 18, 2018  3:15 PM CDT   (Arrive by 2:45 PM)   Return Visit with Kathryn Bsailio MD   [facility] Nephrology ([facility])    [address] [place] [address]  [address]  [facility] [id number] [phone]              Who to contact     Please call your clinic at [phone]  to:    Ask questions about your health    Make or cancel appointments    Discuss your medicines    Learn about your test results    Speak to your doctor            Additional Information About Your Visit        Care EveryWhere ID     This is your Care EveryWhere ID. This could be used by other organizations to access your [facility] medical records  [id number]         Blood Pressure from Last 3 Encounters:   06/13/18 146/56   05/24/18 171/73   05/21/18 169/53    Weight from Last 3 Encounters:   05/24/18 195 lb 12.8 oz   05/21/18 204 lb 3.2 oz   04/27/18 195 lb              We Performed the Following     EXCISION TUMOR SOFT TISSUE FOOT/TOE SUBQ 1.5+CM     Surgical pathology exam          Today's Medication Changes          These changes are accurate as of 6/14/18  2:48 PM.  If you have any questions, ask your nurse or doctor.               These medicines have changed or have updated prescriptions.        Dose/Directions    furosemide 40 MG tablet   Commonly known as:  LASIX   This may have changed:  Another medication with the same name was removed. Continue taking this medication, and follow the directions you see here.   Used for:  Lymphedema of both lower extremities   Changed by:  Eleazar Pack DPM        Dose:  40 mg   Take 1 tablet (40 mg) by mouth 2 times daily   Quantity:  30 tablet   Refills:  11                Primary Care Provider Office Phone # Fax #    Noam Luis [facility] [phone]       [address] [facility] [address]  [facility] [address]        Equal Access to Services     BLAIR [facility]: Hadii danisha lauren Soaleksandar, waaxda luqadaha, qaybta kaalmada ailin, madeline sood . So [facility] [phone].    ATENCIÓN: Si habla español, tiene a snatoro disposición servicios gratuitos de asistencia lingüística. Renuka al [phone].    We comply with applicable federal civil rights laws and [place] laws. We do not discriminate on the basis of race, color,  national origin, age, disability, sex, sexual orientation, or gender identity.            Thank you!     Thank you for choosing [facility]  for your care. Our goal is always to provide you with excellent care. Hearing back from our patients is one way we can continue to improve our services. Please take a few minutes to complete the written survey that you may receive in the mail after your visit with us. Thank you!             Your Updated Medication List - Protect others around you: Learn how to safely use, store and throw away your medicines at www.disposemymeds.org.          This list is accurate as of 6/14/18  2:48 PM.  Always use your most recent med list.                   Brand Name Dispense Instructions for use Diagnosis    ACE/ARB/ARNI NOT PRESCRIBED (INTENTIONAL)      Please choose reason not prescribed, below    CKD (chronic kidney disease) stage 5, GFR less than 15 ml/min (H)       albuterol 108 (90 Base) MCG/ACT Inhaler    PROAIR HFA/PROVENTIL HFA/VENTOLIN HFA    3 Inhaler    Inhale 2 puffs into the lungs every 6 hours as needed for shortness of breath / dyspnea or wheezing    Cough       amoxicillin-clavulanate 500-125 MG per tablet    AUGMENTIN    14 tablet    Take 1 tablet by mouth 2 times daily    Cellulitis of foot, right       atorvastatin 10 MG tablet    LIPITOR    30 tablet    Take 1 tablet (10 mg) by mouth daily    Hyperlipidemia LDL goal <100       * blood glucose monitoring test strip    ONETOUCH ULTRA    200 strip    Use to test blood sugars 2 times daily or as directed.    Type 2 diabetes mellitus with stage 3 chronic kidney disease, without long-term current use of insulin (H)       * blood glucose monitoring test strip    ONETOUCH ULTRA    200 strip    Test your blood sugar 3-4 times per day.    Type 2 diabetes mellitus with hyperglycemia, with long-term current use of insulin (H)       * CALCITRIOL PO      Take by mouth daily Unsure of dosage        * calcitRIOL 0.25 MCG  capsule    ROCALTROL    30 capsule    TAKE 1 CAPSULE (0.25 MCG) BY MOUTH DAILY    Vitamin D deficiency       carvedilol 12.5 MG tablet    COREG    60 tablet    Take 1 tablet (12.5 mg) by mouth 2 times daily (with meals)    Essential hypertension with goal blood pressure less than 140/90       furosemide 40 MG tablet    LASIX    30 tablet    Take 1 tablet (40 mg) by mouth 2 times daily    Lymphedema of both lower extremities       hydrocortisone 1 % cream    CORTAID    60 g    Apply sparingly to affected area two times daily for 14 days.    Intertrigo       insulin glargine 100 UNIT/ML injection    LANTUS    3 mL    Inject 20 Units Subcutaneous At Bedtime    Type 2 diabetes mellitus with diabetic neuropathy, with long-term current use of insulin (H)       insulin pen needle 31G X 6 MM    ULTICARE MINI    100 each    Use daily or as directed.    Type 2 diabetes, HbA1c goal < 7% (H)       miconazole 2 % powder    MICATIN; MICRO GUARD    71 g    Apply topically 2 times daily    Fungal dermatitis       MULTIVITAMIN PO      1 tablet by mouth daily        NovoLOG FLEXPEN 100 UNIT/ML injection   Generic drug:  insulin aspart     15 mL    Inject 4 units SQ with breakfast, lunch and dinner.    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    Edema of lower extremity       order for DME     1 Device    1 SAD light    Seasonal affective disorder (H)       triamcinolone 0.025 % ointment    KENALOG    80 g    Apply to sites of dermatitis under the breasts, chest, buttocks - once a day.    Dermatitis       Urea 20 % Crea cream     85 g    Apply topically daily    Xerosis cutis, Tyloma       * Notice:  This list has 7 medication(s) that are the same as other medications prescribed for you. Read the directions carefully, and ask your doctor or other care provider to review them with you.

## 2018-06-15 ENCOUNTER — TELEPHONE (OUTPATIENT)
Dept: GASTROENTEROLOGY | Facility: CLINIC | Age: 68
End: 2018-06-15

## 2018-06-15 DIAGNOSIS — Z86.0101 HISTORY OF ADENOMATOUS POLYP OF COLON: Primary | ICD-10-CM

## 2018-06-15 NOTE — TELEPHONE ENCOUNTER
See colonoscopy report.    Please arrange for repeat colonoscopy in 5 years in GI lab with IV sedation. Order placed.    ALCEY Morel MD  Associate Professor of Medicine  Division of Gastroenterology, Hepatology and Nutrition  [facility]

## 2018-06-18 ENCOUNTER — OFFICE VISIT (OUTPATIENT)
Dept: ORTHOPEDICS | Facility: CLINIC | Age: 68
End: 2018-06-18
Payer: MEDICARE

## 2018-06-18 DIAGNOSIS — L97.512 PLANTAR ULCER OF RIGHT FOOT WITH FAT LAYER EXPOSED (H): Primary | ICD-10-CM

## 2018-06-18 LAB
GRAM STN SPEC: ABNORMAL
Lab: ABNORMAL
SPECIMEN SOURCE: ABNORMAL

## 2018-06-18 PROCEDURE — 87077 CULTURE AEROBIC IDENTIFY: CPT | Performed by: PODIATRIST

## 2018-06-18 PROCEDURE — 87205 SMEAR GRAM STAIN: CPT | Performed by: PODIATRIST

## 2018-06-18 PROCEDURE — 87070 CULTURE OTHR SPECIMN AEROBIC: CPT | Performed by: PODIATRIST

## 2018-06-18 PROCEDURE — 87186 SC STD MICRODIL/AGAR DIL: CPT | Performed by: PODIATRIST

## 2018-06-18 RX ORDER — AMOXICILLIN AND CLAVULANATE POTASSIUM 500; 125 MG/1; MG/1
1 TABLET, FILM COATED ORAL 2 TIMES DAILY
Qty: 14 TABLET | Refills: 0 | Status: SHIPPED | OUTPATIENT
Start: 2018-06-18 | End: 2018-06-24

## 2018-06-18 NOTE — MR AVS SNAPSHOT
After Visit Summary   6/18/2018    Supriya Herr    MRN: [id number]           Patient Information     Date Of Birth          1950        Visit Information        Provider Department      6/18/2018 9:20 AM Eleazar Pack DPM [facility]        Today's Diagnoses     Plantar ulcer of right foot with fat layer exposed (H)    -  1       Follow-ups after your visit        Your next 10 appointments already scheduled     Jun 22, 2018 10:00 AM CDT   LAB with  LAB   [facility] ([facility])    [address] [place] [address]  1st Floor  [facility] [id number] [phone]           Please do not eat 10-12 hours before your appointment if you are coming in fasting for labs on lipids, cholesterol, or glucose (sugar). This does not apply to pregnant women. Water, hot tea and black coffee (with nothing added) are okay. Do not drink other fluids, diet soda or chew gum.            Jun 22, 2018 11:00 AM CDT   (Arrive by 10:45 AM)   Return Visit with Sandy Morse MD   [facility] ([facility])    [address] [place] [address]  3rd Floor  [facility] [id number] [phone]            Jun 22, 2018 12:00 PM CDT   (Arrive by 11:45 AM)   RETURN FOOT/ANKLE with Eleazar Pack DPM   [facility] ([facility])    [address] [place] [address]  4th Floor  [facility] [id number] [phone]            Jul 10, 2018 11:00 AM CDT   (Arrive by 10:45 AM)   NEW LUNG NODULE with Ravin King MD   [facility] ([facility])    [address] [place] [address]  [address]  [facility] [id number] [phone]            Jul 18, 2018  3:15 PM CDT   (Arrive by 2:45 PM)   Return Visit with Kathryn Basilio MD   [facility] ([facility])    [address] [place] [address]  [address]  [facility] [id number] [phone]              Who to contact      Please call your clinic at [phone] to:    Ask questions about your health    Make or cancel appointments    Discuss your medicines    Learn about your test results    Speak to your doctor            Additional Information About Your Visit        Care EveryWhere ID     This is your Care EveryWhere ID. This could be used by other organizations to access your [facility] medical records  [id number]         Blood Pressure from Last 3 Encounters:   06/13/18 146/56   05/24/18 171/73   05/21/18 169/53    Weight from Last 3 Encounters:   05/24/18 88.8 kg (195 lb 12.8 oz)   05/21/18 92.6 kg (204 lb 3.2 oz)   04/27/18 88.5 kg (195 lb)              We Performed the Following     Gram stain     Wound Culture Aerobic Bacterial          Today's Medication Changes          These changes are accurate as of 6/18/18 10:21 AM.  If you have any questions, ask your nurse or doctor.               Start taking these medicines.        Dose/Directions    amoxicillin-clavulanate 500-125 MG per tablet   Commonly known as:  AUGMENTIN   Used for:  Plantar ulcer of right foot with fat layer exposed (H)   Started by:  Eleazar Pack DPM        Dose:  1 tablet   Take 1 tablet by mouth 2 times daily   Quantity:  14 tablet   Refills:  0            Where to get your medicines      These medications were sent to [facility]/pharmacy #[id number] - [address] [address]     Phone:  [phone]     amoxicillin-clavulanate 500-125 MG per tablet                Primary Care Provider Office Phone # Fax #    Noam Jackson [phone]       [address] [address]        Equal Access to Services     [facility]: Emeterio Lam, waaxda luqadaha, qaybta henriquealmadeline ramos. So [facility] [place] [phone].    ATENCIÓN: Si habla español, tiene a santoro disposición servicios gratuitos de asistencia  lingüísticaBandar Grayson al [phone].    We comply with applicable federal civil rights laws and [place] laws. We do not discriminate on the basis of race, color, national origin, age, disability, sex, sexual orientation, or gender identity.            Thank you!     Thank you for choosing [facility]  for your care. Our goal is always to provide you with excellent care. Hearing back from our patients is one way we can continue to improve our services. Please take a few minutes to complete the written survey that you may receive in the mail after your visit with us. Thank you!             Your Updated Medication List - Protect others around you: Learn how to safely use, store and throw away your medicines at www.disposemymeds.org.          This list is accurate as of 6/18/18 10:21 AM.  Always use your most recent med list.                   Brand Name Dispense Instructions for use Diagnosis    ACE/ARB/ARNI NOT PRESCRIBED (INTENTIONAL)      Please choose reason not prescribed, below    CKD (chronic kidney disease) stage 5, GFR less than 15 ml/min (H)       albuterol 108 (90 Base) MCG/ACT Inhaler    PROAIR HFA/PROVENTIL HFA/VENTOLIN HFA    3 Inhaler    Inhale 2 puffs into the lungs every 6 hours as needed for shortness of breath / dyspnea or wheezing    Cough       amoxicillin-clavulanate 500-125 MG per tablet    AUGMENTIN    14 tablet    Take 1 tablet by mouth 2 times daily    Plantar ulcer of right foot with fat layer exposed (H)       atorvastatin 10 MG tablet    LIPITOR    30 tablet    Take 1 tablet (10 mg) by mouth daily    Hyperlipidemia LDL goal <100       * blood glucose monitoring test strip    ONETOUCH ULTRA    200 strip    Use to test blood sugars 2 times daily or as directed.    Type 2 diabetes mellitus with stage 3 chronic kidney disease, without long-term current use of insulin (H)       * blood glucose monitoring test strip    ONETOUCH ULTRA    200 strip    Test your blood sugar 3-4  times per day.    Type 2 diabetes mellitus with hyperglycemia, with long-term current use of insulin (H)       * CALCITRIOL PO      Take by mouth daily Unsure of dosage        * calcitRIOL 0.25 MCG capsule    ROCALTROL    30 capsule    TAKE 1 CAPSULE (0.25 MCG) BY MOUTH DAILY    Vitamin D deficiency       carvedilol 12.5 MG tablet    COREG    60 tablet    Take 1 tablet (12.5 mg) by mouth 2 times daily (with meals)    Essential hypertension with goal blood pressure less than 140/90       furosemide 40 MG tablet    LASIX    30 tablet    Take 1 tablet (40 mg) by mouth 2 times daily    Lymphedema of both lower extremities       hydrocortisone 1 % cream    CORTAID    60 g    Apply sparingly to affected area two times daily for 14 days.    Intertrigo       insulin glargine 100 UNIT/ML injection    LANTUS    3 mL    Inject 20 Units Subcutaneous At Bedtime    Type 2 diabetes mellitus with diabetic neuropathy, with long-term current use of insulin (H)       insulin pen needle 31G X 6 MM    ULTICARE MINI    100 each    Use daily or as directed.    Type 2 diabetes, HbA1c goal < 7% (H)       miconazole 2 % powder    MICATIN; MICRO GUARD    71 g    Apply topically 2 times daily    Fungal dermatitis       MULTIVITAMIN PO      1 tablet by mouth daily        NovoLOG FLEXPEN 100 UNIT/ML injection   Generic drug:  insulin aspart     15 mL    Inject 4 units SQ with breakfast, lunch and dinner.    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    Edema of lower extremity       order for DME     1 Device    1 SAD light    Seasonal affective disorder (H)       triamcinolone 0.025 % ointment    KENALOG    80 g    Apply to sites of dermatitis under the breasts, chest, buttocks - once a day.    Dermatitis       Urea 20 % Crea cream     85 g    Apply topically daily    Xerosis cutis, Tyloma       * Notice:  This list has 7 medication(s) that are the same as other medications prescribed for you. Read the directions carefully, and ask your doctor or other care provider to review them with you.

## 2018-06-18 NOTE — LETTER
6/18/2018       RE: Supriya Herr  [address]  [facility] [address]     Dear Colleague,    Thank you for referring your patient, Supriya Herr, to the [facility] at [facility]. Please see a copy of my visit note below.    Chief Complaint: No chief complaint on file.  Allergies   Allergen Reactions     Nkda [No Known Drug Allergies]      Subjective: Supriya is a 68 year old female who presents to the clinic today for a follow up of right foot ulceration. At Thursday's visit, tissue was removed from the area and sent to pathology. No results on this yet. She is with her daughter today. They have been changing the dressing daily with betadine as directed. Did have some redness over the weekend. Her daughter did outline the area. She relates decreased pain to the area.     Objective    A wound is noted at right  1st interspace measuring  0.5cm x 0.5cm x 0.2cm.      Cardenas Classification: 2     Wound base: Red/Granulation     Edges: intact     Drainage: scant and moderate/serosanguinous and purulent respectively      Odor: no     Undermining: no     Cystic mass was removed from the ulcer.      Bone Exposure: No     Clinical Signs of Infection: Small amount of purulence. Some mild erythema noted to the forefoot. No calor.      After obtaining patient consent, the wound was irrigated with 500cc's of saline through an 18G needle. No sharp debridement performed.     Assessment: Right foot ulceration - improving in size. Some erythema.     Plan:   - Pt seen and evaluated  - Culture was taken of the area. Awaiting previous path.   - Irrigated the area.   - Iodofoam and Allevyn life dressing applied to the area. This should be changed on Wednesday.  - Restarted Augmentin.   - Pt to return to clinic in 5 days.       Again, thank you for allowing me to participate in the care of your patient.      Sincerely,    Eleazar Pack DPM

## 2018-06-18 NOTE — PROGRESS NOTES
Chief Complaint: No chief complaint on file.         Allergies   Allergen Reactions     Nkda [No Known Drug Allergies]          Subjective: Supriya is a 68 year old female who presents to the clinic today for a follow up of right foot ulceration. At Thursday's visit, tissue was removed from the area and sent to pathology. No results on this yet. She is with her daughter today. They have been changing the dressing daily with betadine as directed. Did have some redness over the weekend. Her daughter did outline the area. She relates decreased pain to the area.     Objective    A wound is noted at right  1st interspace measuring 0.5cm x 0.5cm x 0.2cm.      Cardenas Classification: 2     Wound base: Red/Granulation     Edges: intact     Drainage: scant and moderate/serosanguinous and purulent respectively      Odor: no     Undermining: no     Cystic mass was removed from the ulcer.      Bone Exposure: No     Clinical Signs of Infection: Small amount of purulence. Some mild erythema noted to the forefoot. No calor.      After obtaining patient consent, the wound was irrigated with 500cc's of saline through an 18G needle. No sharp debridement performed.     Assessment: Right foot ulceration - improving in size. Some erythema.     Plan:   - Pt seen and evaluated  - Culture was taken of the area. Awaiting previous path.   - Irrigated the area.   - Iodofoam and Allevyn life dressing applied to the area. This should be changed on Wednesday.  - Restarted Augmentin.   - Pt to return to clinic in 5 days.

## 2018-06-18 NOTE — NURSING NOTE
Reason For Visit:   Chief Complaint   Patient presents with     Wound Check     Wound, right foot. Follow up MRI, CT and pathology.        Pain Assessment  Patient Currently in Pain: Denies               Current Outpatient Prescriptions   Medication Sig Dispense Refill     ACE/ARB/ARNI NOT PRESCRIBED, INTENTIONAL, Please choose reason not prescribed, below       albuterol (PROAIR HFA, PROVENTIL HFA, VENTOLIN HFA) 108 (90 BASE) MCG/ACT inhaler Inhale 2 puffs into the lungs every 6 hours as needed for shortness of breath / dyspnea or wheezing 3 Inhaler 1     atorvastatin (LIPITOR) 10 MG tablet Take 1 tablet (10 mg) by mouth daily 30 tablet      blood glucose monitoring (ONE TOUCH ULTRA) test strip Use to test blood sugars 2 times daily or as directed. 200 strip 3     blood glucose monitoring (ONETOUCH ULTRA) test strip Test your blood sugar 3-4 times per day. 200 strip 3     calcitRIOL (ROCALTROL) 0.25 MCG capsule TAKE 1 CAPSULE (0.25 MCG) BY MOUTH DAILY 30 capsule 3     CALCITRIOL PO Take by mouth daily Unsure of dosage       carvedilol (COREG) 12.5 MG tablet Take 1 tablet (12.5 mg) by mouth 2 times daily (with meals) 60 tablet 11     furosemide (LASIX) 40 MG tablet Take 1 tablet (40 mg) by mouth 2 times daily 30 tablet 11     hydrocortisone (CORTAID) 1 % cream Apply sparingly to affected area two times daily for 14 days. 60 g 3     insulin glargine (LANTUS) 100 UNIT/ML injection Inject 20 Units Subcutaneous At Bedtime 3 mL 1     insulin pen needle (ULTICARE MINI) 31G X 6 MM Use daily or as directed. 100 each prn     miconazole (MICATIN; MICRO GUARD) 2 % powder Apply topically 2 times daily 71 g 0     MULTIVITAMIN OR 1 tablet by mouth daily       NOVOLOG FLEXPEN 100 UNIT/ML soln Inject 4 units SQ with breakfast, lunch and dinner. 15 mL 3     order for DME 1 SAD light 1 Device 1     order for DME Equipment being ordered: Right Lower extremity Solaris Ready wrap calf piece:  Size small/length tall , knee piece: Size  small , Thigh piece size small/length average. 1 Units 0     order for DME 1 wheelchair 1 Device 0     order for DME Equipment being ordered: TEDS stocking   Below the knee 15-20 mg  Dispense 2  Use daily 2 Device 1     ORDER FOR DME Equipment being ordered: Compression stockings, 20-30 MMHG, knee high 1 Device 0     triamcinolone (KENALOG) 0.025 % ointment Apply to sites of dermatitis under the breasts, chest, buttocks - once a day. 80 g 1     Urea 20 % CREA cream Apply topically daily 85 g 3          Allergies   Allergen Reactions     Nkda [No Known Drug Allergies]

## 2018-06-20 ENCOUNTER — NURSE TRIAGE (OUTPATIENT)
Dept: NURSING | Facility: CLINIC | Age: 68
End: 2018-06-20

## 2018-06-20 ENCOUNTER — TELEPHONE (OUTPATIENT)
Dept: ENDOCRINOLOGY | Facility: CLINIC | Age: 68
End: 2018-06-20

## 2018-06-20 NOTE — TELEPHONE ENCOUNTER
Reason for Disposition    [1] Blood glucose > 300 mg/dl (16.5 mmol/l) AND [2] two or more times in a row    Additional Information    Negative: Unconscious or difficult to awaken    Negative: Acting confused (e.g., disoriented, slurred speech)    Negative: Very weak (e.g., can't stand)    Negative: Sounds like a life-threatening emergency to the triager    Negative: [1] Vomiting AND [2] signs of dehydration (e.g., very dry mouth, lightheaded, etc.)    Negative: [1] Blood glucose > 240 mg/dl (13 mmol/l) AND [2] rapid breathing    Negative: Blood glucose > 500 mg/dl (27.5 mmol/l)    Negative: [1] Blood glucose > 240 mg/dl (13 mmol/l) AND [2] urine ketones moderate-large (or more than 1+)    Negative: [1] Blood glucose > 240 mg/dl (13 mmol/l) AND [2] blood ketones > 1.5 mmol/l    Negative: [1] Blood glucose > 240 mg/dl (13 mmol/l) AND [2] vomiting AND [3] unable to check for ketones (in blood or urine)    Negative: [1] New onset Diabetes suspected (e.g., frequent urination, weak, weight loss) AND [2] vomiting or rapid breathing    Negative: Vomiting lasts > 4 hours    Negative: Patient sounds very sick or weak to the triager    Negative: Fever > 100.5 F (38.1 C)    Negative: Blood glucose > 400 mg/dl (22 mmol/l)    Protocols used: DIABETES - HIGH BLOOD SUGAR-ADULT-    Supriya calls and says that her blood sugar was over 500, between 5p-6p. Blood sugar was 381, @ 1842. Pt. Refuses to got to an ER tonight. Dr. Doe-[place] [facility] [place] Endocrinologist-was then paged, at: [phone], to call Supriya, at: [phone], via page .

## 2018-06-21 LAB — COPATH REPORT: NORMAL

## 2018-06-21 NOTE — TELEPHONE ENCOUNTER
Daughter Caroline Gray - Pt under great stress - brother in hospice - foot infection - ER for it  The infection came back - started antibiotics on Tuesday of this week - go back to  tomorrow RE infection - waiting for pathology on her foot - increased Lasix 2 weeks ago -   Scheduled first available warren/ Sravanthi Cardona 07/09/2018

## 2018-06-21 NOTE — TELEPHONE ENCOUNTER
[facility] Call Center    Phone Message    May a detailed message be left on voicemail: yes    Reason for Call: Symptoms or Concerns     If patient has red-flag symptoms, warm transfer to triage line    Current symptom or concern: Elevated Blood sugar over 500 last night, has now lowered    Symptoms have been present for:  2 week(s)    Has patient previously been seen for this? Yes    By : Arabella Kamara    Date: 3.2.18    Are there any new or worsening symptoms? No      Action Taken: Message routed to:  [facility] ([facility]): iesha ward

## 2018-06-21 NOTE — TELEPHONE ENCOUNTER
Patient called regarding high blood sugars. Patient stated that BG was >500 a couple of hours ago. It came down to <400 an hour ago but then was a little higher again, 381. She finished her dinner about an hour ago and took 4 units of scheduled novolog with meal. She takes 20 units of lantus at bedtime. Patient feels well otherwise. No fever or chills. Denies polydipsia or polyuria. She indicated that she does not want to go to the hospital. She denies any change in her daily routine today or eating anything different. She could not recall her sugars from the last couple of days. She did mention that she had a popsicle earlier today. BG is 367 now. I advised her to take additional 2 units of novolog now and check BG is an hour and then at bedtime. Also advised her to drink plenty of fluids. I asked her to call me back, if her sugars are not improving. She was also advised to call the clinic in AM as she is overdue for her appointment. Discussed with her in detail and she verbalized understanding.

## 2018-06-22 ENCOUNTER — OFFICE VISIT (OUTPATIENT)
Dept: DERMATOLOGY | Facility: CLINIC | Age: 68
End: 2018-06-22
Attending: PHYSICIAN ASSISTANT
Payer: MEDICARE

## 2018-06-22 ENCOUNTER — OFFICE VISIT (OUTPATIENT)
Dept: ORTHOPEDICS | Facility: CLINIC | Age: 68
End: 2018-06-22
Payer: MEDICARE

## 2018-06-22 DIAGNOSIS — E11.21 TYPE 2 DIABETES MELLITUS WITH DIABETIC NEPHROPATHY, WITH LONG-TERM CURRENT USE OF INSULIN (H): ICD-10-CM

## 2018-06-22 DIAGNOSIS — E11.628 DIABETIC FOOT INFECTION (H): ICD-10-CM

## 2018-06-22 DIAGNOSIS — N18.5 CKD (CHRONIC KIDNEY DISEASE) STAGE 5, GFR LESS THAN 15 ML/MIN (H): Primary | ICD-10-CM

## 2018-06-22 DIAGNOSIS — L97.519 PLANTAR ULCER OF RIGHT FOOT, UNSPECIFIED ULCER STAGE (H): ICD-10-CM

## 2018-06-22 DIAGNOSIS — L08.1 ERYTHRASMA: ICD-10-CM

## 2018-06-22 DIAGNOSIS — Z79.4 TYPE 2 DIABETES MELLITUS WITH DIABETIC NEPHROPATHY, WITH LONG-TERM CURRENT USE OF INSULIN (H): ICD-10-CM

## 2018-06-22 DIAGNOSIS — N18.5 CKD (CHRONIC KIDNEY DISEASE) STAGE 5, GFR LESS THAN 15 ML/MIN (H): ICD-10-CM

## 2018-06-22 DIAGNOSIS — L08.9 DIABETIC FOOT INFECTION (H): ICD-10-CM

## 2018-06-22 DIAGNOSIS — L30.4 INTERTRIGO: Primary | ICD-10-CM

## 2018-06-22 DIAGNOSIS — L97.512 PLANTAR ULCER OF RIGHT FOOT WITH FAT LAYER EXPOSED (H): ICD-10-CM

## 2018-06-22 DIAGNOSIS — L03.031 CELLULITIS OF TOE OF RIGHT FOOT: Primary | ICD-10-CM

## 2018-06-22 LAB
ALBUMIN SERPL-MCNC: 2.7 G/DL (ref 3.4–5)
ANION GAP SERPL CALCULATED.3IONS-SCNC: 8 MMOL/L (ref 3–14)
BACTERIA SPEC CULT: ABNORMAL
BUN SERPL-MCNC: 68 MG/DL (ref 7–30)
CALCIUM SERPL-MCNC: 9.3 MG/DL (ref 8.5–10.1)
CHLORIDE SERPL-SCNC: 112 MMOL/L (ref 94–109)
CO2 SERPL-SCNC: 26 MMOL/L (ref 20–32)
CREAT SERPL-MCNC: 3.34 MG/DL (ref 0.52–1.04)
GFR SERPL CREATININE-BSD FRML MDRD: 14 ML/MIN/1.7M2
GLUCOSE SERPL-MCNC: 70 MG/DL (ref 70–99)
HBA1C MFR BLD: 6 % (ref 0–5.6)
HGB BLD-MCNC: 10.7 G/DL (ref 11.7–15.7)
Lab: ABNORMAL
PHOSPHATE SERPL-MCNC: 4.6 MG/DL (ref 2.5–4.5)
POTASSIUM SERPL-SCNC: 4.1 MMOL/L (ref 3.4–5.3)
SODIUM SERPL-SCNC: 145 MMOL/L (ref 133–144)
SPECIMEN SOURCE: ABNORMAL

## 2018-06-22 RX ORDER — CLINDAMYCIN PHOSPHATE 10 UG/ML
LOTION TOPICAL 2 TIMES DAILY
Qty: 60 ML | Refills: 3 | Status: ON HOLD | OUTPATIENT
Start: 2018-06-22 | End: 2018-09-26

## 2018-06-22 RX ORDER — CLINDAMYCIN HCL 300 MG
300 CAPSULE ORAL 3 TIMES DAILY
Qty: 21 CAPSULE | Refills: 0 | Status: SHIPPED | OUTPATIENT
Start: 2018-06-22 | End: 2018-07-02

## 2018-06-22 ASSESSMENT — PAIN SCALES - GENERAL: PAINLEVEL: NO PAIN (0)

## 2018-06-22 NOTE — NURSING NOTE
Reason For Visit:   Chief Complaint   Patient presents with     RECHECK     Follow up on right foot       Follow up on ulcer on plantar aspect of right foot.     Pain Assessment  Patient Currently in Pain: Yes  0-10 Pain Scale: 1  Primary Pain Location: Foot  Pain Orientation: Right

## 2018-06-22 NOTE — PATIENT INSTRUCTIONS
For the rash under the breasts and in the groin folds:  This is irritation which is due to a combination of yeast and bacteria.     Plan:  - Start vinegar soaks twice daily and pat dry afterwards.   Instructions:  Add 3 tablespoons of vinegar to a quart of water. Soak cotton rags or soft towels in this solution, wring them out so they are damp, and place on the areas. Leave them on for 15-30 minutes, then remove and pat dry.   - Start triamcinolone ointment twice daily (you have this at home already, this is the prescription from your last visit). Apply to all the affected areas listed above.   - Start clindamycin lotion twice daily (this is a new prescription that I sent today). Apply to all the affected areas listed above.     Return in 4-6 weeks to recheck the rash.

## 2018-06-22 NOTE — NURSING NOTE
"Dermatology Rooming Note    Supriya Herr's goals for this visit include:   Chief Complaint   Patient presents with     Derm Problem     Supriya is here today for a dermatitis follow up. Supriya notes' It not getting any better\"      Ronit Morales, RMKENDRA    "

## 2018-06-22 NOTE — MR AVS SNAPSHOT
After Visit Summary   6/22/2018    Supriya Herr    MRN: [id number]           Patient Information     Date Of Birth          1950        Visit Information        Provider Department      6/22/2018 12:00 PM [facility]        Today's Diagnoses     Cellulitis of toe of right foot    -  1    Diabetic foot infection (H)        Plantar ulcer of right foot with fat layer exposed (H)           Follow-ups after your visit        Your next 10 appointments already scheduled     Jun 26, 2018  3:30 PM CDT   (Arrive by 3:15 PM)   RETURN FOOT/ANKLE with Alvaro Gautam MD   [facility] ([facility])    [address]  4th Floor  [facility] [id number] [phone]            Jul 09, 2018  8:00 AM CDT   (Arrive by 7:45 AM)   RETURN DIABETES with Sravanthi Cardona PA-C   [facility] ([facility])    [address]  3rd Floor  [place] [id number] [phone]            Jul 10, 2018 11:00 AM CDT   (Arrive by 10:45 AM)   NEW LUNG NODULE with Ravin King MD   [facility] ([facility])    [address]  [address]  [facility] [id number] [phone]            Jul 18, 2018  3:15 PM CDT   (Arrive by 2:45 PM)   Return Visit with Kathryn Basilio MD   [facility] ([facility])    [address]  [address]  [facility] [id number] [phone]              Who to contact     Please call your clinic at [phone] to:    Ask questions about your health    Make or cancel appointments    Discuss your medicines    Learn about your test results    Speak to your doctor            Additional Information About Your Visit        Care EveryWhere ID     This is your Care EveryWhere ID. This could be used by other organizations to access your [facility]  records  [id number]         Blood Pressure from Last 3 Encounters:   06/13/18 146/56   05/24/18 171/73   05/21/18 169/53    Weight from Last 3 Encounters:   05/24/18 88.8 kg (195 lb 12.8 oz)   05/21/18 92.6 kg (204 lb 3.2 oz)   04/27/18 88.5 kg (195 lb)              We Performed the Following     DEBRIDEMENT WOUND UP TO 20 SQ CM          Today's Medication Changes          These changes are accurate as of 6/22/18  4:50 PM.  If you have any questions, ask your nurse or doctor.               Start taking these medicines.        Dose/Directions    clindamycin 1 % lotion   Commonly known as:  CLEOCIN T   Used for:  Intertrigo   Started by:  Sandy Morse MD        Apply topically 2 times daily   Quantity:  60 mL   Refills:  3       clindamycin 300 MG capsule   Commonly known as:  CLEOCIN   Used for:  Cellulitis of toe of right foot   Started by:  Eleazar Pack DPM        Dose:  300 mg   Take 1 capsule (300 mg) by mouth 3 times daily   Quantity:  21 capsule   Refills:  0            Where to get your medicines      These [id number] - [place], [place] - [address]  [address] [facility] [address]     Phone:  [phone]     clindamycin 1 % lotion    clindamycin 300 MG capsule                Primary Care Provider Office Phone # Fax #    Noam Luis Jackson [phone]       [address] [facility] [address]  [facility] [address]        Equal Access to Services     [facility]: Hadii aad ku hadasho Soomaali, waaxda luqadaha, qaybta kaalmada adeegyada, waxay sonal chaudhari. So [facility] [phone].    ATENCIÓN: Si habla español, tiene a santoro disposición servicios gratuitos de asistencia lingüística. Llame al [phone].    We comply with applicable federal civil rights laws and [place] laws. We do not discriminate on the basis of race, color, national origin, age, disability, sex, sexual orientation, or gender  identity.            Thank you!     Thank you for choosing [facility]  for your care. Our goal is always to provide you with excellent care. Hearing back from our patients is one way we can continue to improve our services. Please take a few minutes to complete the written survey that you may receive in the mail after your visit with us. Thank you!             Your Updated Medication List - Protect others around you: Learn how to safely use, store and throw away your medicines at www.disposemymeds.org.          This list is accurate as of 6/22/18  4:50 PM.  Always use your most recent med list.                   Brand Name Dispense Instructions for use Diagnosis    ACE/ARB/ARNI NOT PRESCRIBED (INTENTIONAL)      Please choose reason not prescribed, below    CKD (chronic kidney disease) stage 5, GFR less than 15 ml/min (H)       albuterol 108 (90 Base) MCG/ACT Inhaler    PROAIR HFA/PROVENTIL HFA/VENTOLIN HFA    3 Inhaler    Inhale 2 puffs into the lungs every 6 hours as needed for shortness of breath / dyspnea or wheezing    Cough       amoxicillin-clavulanate 500-125 MG per tablet    AUGMENTIN    14 tablet    Take 1 tablet by mouth 2 times daily    Plantar ulcer of right foot with fat layer exposed (H)       atorvastatin 10 MG tablet    LIPITOR    30 tablet    Take 1 tablet (10 mg) by mouth daily    Hyperlipidemia LDL goal <100       * blood glucose monitoring test strip    ONETOUCH ULTRA    200 strip    Use to test blood sugars 2 times daily or as directed.    Type 2 diabetes mellitus with stage 3 chronic kidney disease, without long-term current use of insulin (H)       * blood glucose monitoring test strip    ONETOUCH ULTRA    200 strip    Test your blood sugar 3-4 times per day.    Type 2 diabetes mellitus with hyperglycemia, with long-term current use of insulin (H)       * CALCITRIOL PO      Take by mouth daily Unsure of dosage        * calcitRIOL 0.25 MCG capsule    ROCALTROL    30 capsule     TAKE 1 CAPSULE (0.25 MCG) BY MOUTH DAILY    Vitamin D deficiency       carvedilol 12.5 MG tablet    COREG    60 tablet    Take 1 tablet (12.5 mg) by mouth 2 times daily (with meals)    Essential hypertension with goal blood pressure less than 140/90       clindamycin 1 % lotion    CLEOCIN T    60 mL    Apply topically 2 times daily    Intertrigo       clindamycin 300 MG capsule    CLEOCIN    21 capsule    Take 1 capsule (300 mg) by mouth 3 times daily    Cellulitis of toe of right foot       furosemide 40 MG tablet    LASIX    30 tablet    Take 1 tablet (40 mg) by mouth 2 times daily    Lymphedema of both lower extremities       hydrocortisone 1 % cream    CORTAID    60 g    Apply sparingly to affected area two times daily for 14 days.    Intertrigo       insulin glargine 100 UNIT/ML injection    LANTUS    3 mL    Inject 20 Units Subcutaneous At Bedtime    Type 2 diabetes mellitus with diabetic neuropathy, with long-term current use of insulin (H)       insulin pen needle 31G X 6 MM    ULTICARE MINI    100 each    Use daily or as directed.    Type 2 diabetes, HbA1c goal < 7% (H)       miconazole 2 % powder    MICATIN; MICRO GUARD    71 g    Apply topically 2 times daily    Fungal dermatitis       MULTIVITAMIN PO      1 tablet by mouth daily        NovoLOG FLEXPEN 100 UNIT/ML injection   Generic drug:  insulin aspart     15 mL    Inject 4 units SQ with breakfast, lunch and dinner.    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    Edema of lower extremity       order for DME     1 Device    1 SAD light    Seasonal affective disorder (H)       triamcinolone 0.025 % ointment    KENALOG    80 g    Apply to sites of dermatitis under the breasts, chest, buttocks - once a day.    Dermatitis       Urea 20 % Crea cream     85 g    Apply topically daily    Xerosis cutis, Tyloma       * Notice:  This list has 7 medication(s) that are the same as other medications prescribed for you. Read the directions carefully, and ask your doctor or other care provider to review them with you.

## 2018-06-22 NOTE — MR AVS SNAPSHOT
After Visit Summary   6/22/2018    Supriya Herr    MRN: [id number]           Patient Information     Date Of Birth          1950        Visit Information        Provider Department      6/22/2018 11:00 AM Sandy Morse MD [facility]        Today's Diagnoses     Intertrigo    -  1      Care Instructions    For the rash under the breasts and in the groin folds:  This is irritation which is due to a combination of yeast and bacteria.     Plan:  - Start vinegar soaks twice daily and pat dry afterwards.   Instructions:  Add 3 tablespoons of vinegar to a quart of water. Soak cotton rags or soft towels in this solution, wring them out so they are damp, and place on the areas. Leave them on for 15-30 minutes, then remove and pat dry.   - Start triamcinolone ointment twice daily (you have this at home already, this is the prescription from your last visit). Apply to all the affected areas listed above.   - Start clindamycin lotion twice daily (this is a new prescription that I sent today). Apply to all the affected areas listed above.     Return in 4-6 weeks to recheck the rash.           Follow-ups after your visit        Follow-up notes from your care team     Return in about 4 weeks (around 7/20/2018) for 4-6 weeks.      Your next 10 appointments already scheduled     Jun 22, 2018 12:00 PM CDT   (Arrive by 11:45 AM)   RETURN FOOT/ANKLE with Eleazar Pack [facility] ([facility])    [address] [place] [id number] [phone]            Jun 22, 2018 12:30 PM CDT   LAB with [facility] ([facility])    [address] [place] [id number] [phone]           Please do not eat 10-12 hours before your appointment if you are coming in fasting for labs on lipids, cholesterol, or glucose (sugar). This does not apply to pregnant women. Water, hot tea  and black coffee (with nothing added) are okay. Do not drink other fluids, diet soda or chew gum.            Jul 09, 2018  8:00 AM CDT   (Arrive by 7:45 AM)   RETURN DIABETES with Sravanthi Cardona PA-C   [facility] ([facility])    [address]  3rd Floor  [facility] [id number] [phone]            Jul 10, 2018 11:00 AM CDT   (Arrive by 10:45 AM)   NEW LUNG NODULE with Ravin King MD   [facility] ([facility])    [address]  [address]  [facility] [phone] [phone]            Jul 18, 2018  3:15 PM CDT   (Arrive by 2:45 PM)   Return Visit with Kathryn Basilio MD   [facility] ([facility])    [address]  [address]  [facility] [id number] [phone]              Future tests that were ordered for you today     Open Future Orders        Priority Expected Expires Ordered    Renal panel Routine  7/22/2018 6/22/2018    Hemoglobin Routine  7/22/2018 6/22/2018            Who to contact     Please call your clinic at [phone] to:    Ask questions about your health    Make or cancel appointments    Discuss your medicines    Learn about your test results    Speak to your doctor            Additional Information About Your Visit        Care EveryWhere ID     This is your Care EveryWhere ID. This could be used by other organizations to access your [facility] medical records  [id number]         Blood Pressure from Last 3 Encounters:   06/13/18 146/56   05/24/18 171/73   05/21/18 169/53    Weight from Last 3 Encounters:   05/24/18 88.8 kg (195 lb 12.8 oz)   05/21/18 92.6 kg (204 lb 3.2 oz)   04/27/18 88.5 kg (195 lb)              Today, you had the following     No orders found for display         Today's Medication Changes          These changes are accurate as of 6/22/18 11:57 AM.  If you have any questions, ask your nurse or doctor.                Start taking these medicines.        Dose/Directions    clindamycin 1 % lotion   Commonly known as:  CLEOCIN T   Used for:  Intertrigo   Started by:  Sandy Morse MD        Apply topically 2 times daily   Quantity:  60 mL   Refills:  3            Where to get your medicines      [id number] [place], [place] - [address]  [address] [facility] [address]     Phone:  [phone]     clindamycin 1 % lotion                Primary Care Provider Office Phone # Fax #    Noam Luis Jackson [phone]       [address]  [facility] [address]        Equal Access to Services     [facility]: Hadii aad indira hadasho Soaleksandar, waaxda luqadaha, qaybta kaalmada adeegyada, madeline sood . So [facility] [phone].    ATENCIÓN: Si habla español, tiene a santoro disposición servicios gratuitos de asistencia lingüística. Llame al [phone].    We comply with applicable federal civil rights laws and [place] laws. We do not discriminate on the basis of race, color, national origin, age, disability, sex, sexual orientation, or gender identity.            Thank you!     Thank you for choosing [facility]  for your care. Our goal is always to provide you with excellent care. Hearing back from our patients is one way we can continue to improve our services. Please take a few minutes to complete the written survey that you may receive in the mail after your visit with us. Thank you!             Your Updated Medication List - Protect others around you: Learn how to safely use, store and throw away your medicines at www.disposemymeds.org.          This list is accurate as of 6/22/18 11:57 AM.  Always use your most recent med list.                   Brand Name Dispense Instructions for use Diagnosis    ACE/ARB/ARNI NOT PRESCRIBED (INTENTIONAL)      Please choose reason not prescribed, below    CKD (chronic kidney  disease) stage 5, GFR less than 15 ml/min (H)       albuterol 108 (90 Base) MCG/ACT Inhaler    PROAIR HFA/PROVENTIL HFA/VENTOLIN HFA    3 Inhaler    Inhale 2 puffs into the lungs every 6 hours as needed for shortness of breath / dyspnea or wheezing    Cough       amoxicillin-clavulanate 500-125 MG per tablet    AUGMENTIN    14 tablet    Take 1 tablet by mouth 2 times daily    Plantar ulcer of right foot with fat layer exposed (H)       atorvastatin 10 MG tablet    LIPITOR    30 tablet    Take 1 tablet (10 mg) by mouth daily    Hyperlipidemia LDL goal <100       * blood glucose monitoring test strip    ONETOUCH ULTRA    200 strip    Use to test blood sugars 2 times daily or as directed.    Type 2 diabetes mellitus with stage 3 chronic kidney disease, without long-term current use of insulin (H)       * blood glucose monitoring test strip    ONETOUCH ULTRA    200 strip    Test your blood sugar 3-4 times per day.    Type 2 diabetes mellitus with hyperglycemia, with long-term current use of insulin (H)       * CALCITRIOL PO      Take by mouth daily Unsure of dosage        * calcitRIOL 0.25 MCG capsule    ROCALTROL    30 capsule    TAKE 1 CAPSULE (0.25 MCG) BY MOUTH DAILY    Vitamin D deficiency       carvedilol 12.5 MG tablet    COREG    60 tablet    Take 1 tablet (12.5 mg) by mouth 2 times daily (with meals)    Essential hypertension with goal blood pressure less than 140/90       clindamycin 1 % lotion    CLEOCIN T    60 mL    Apply topically 2 times daily    Intertrigo       furosemide 40 MG tablet    LASIX    30 tablet    Take 1 tablet (40 mg) by mouth 2 times daily    Lymphedema of both lower extremities       hydrocortisone 1 % cream    CORTAID    60 g    Apply sparingly to affected area two times daily for 14 days.    Intertrigo       insulin glargine 100 UNIT/ML injection    LANTUS    3 mL    Inject 20 Units Subcutaneous At Bedtime    Type 2 diabetes mellitus with diabetic neuropathy, with long-term current use  of insulin (H)       insulin pen needle 31G X 6 MM    ULTICARE MINI    100 each    Use daily or as directed.    Type 2 diabetes, HbA1c goal < 7% (H)       miconazole 2 % powder    MICATIN; MICRO GUARD    71 g    Apply topically 2 times daily    Fungal dermatitis       MULTIVITAMIN PO      1 tablet by mouth daily        NovoLOG FLEXPEN 100 UNIT/ML injection   Generic drug:  insulin aspart     15 mL    Inject 4 units SQ with breakfast, lunch and dinner.    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    Edema of lower extremity       order for DME     1 Device    1 SAD light    Seasonal affective disorder (H)       triamcinolone 0.025 % ointment    KENALOG    80 g    Apply to sites of dermatitis under the breasts, chest, buttocks - once a day.    Dermatitis       Urea 20 % Crea cream     85 g    Apply topically daily    Xerosis cutis, Tyloma       * Notice:  This list has 7 medication(s) that are the same as other medications prescribed for you. Read the directions carefully, and ask your doctor or other care provider to review them with you.

## 2018-06-22 NOTE — PROGRESS NOTES
Chief Complaint:   Chief Complaint   Patient presents with     RECHECK     Follow up on right foot          Allergies   Allergen Reactions     Nkda [No Known Drug Allergies]          Subjective: Supriya is a 68 year old female who presents to the clinic today for a follow up of right foot ulceration.  She presents with her daughter today.  Her daughter relates that they have been using iodoform in the area.  Supriya relates that the pain has decreased some.  However, she does relate that there is some redness around the toes.    Objective  Data Unavailable Data Unavailable Data Unavailable Data Unavailable Data Unavailable 0 lbs 0 oz     Pathology:  FINAL DIAGNOSIS:   SOFT TISSUE, RIGHT FIRST INTERSPACE, EXCISION:   - Keratin without cyst lining, see comment.   - No evidence of malignancy.     COMMENT:   The specimen is consistent with content of an epidermal inclusion cyst or   compact keratin shavings. No cyst   lining is present in this specimen.    Cx:  Component Results   Component Collected Lab   Specimen Description 06/18/2018  9:49    Right Foot Wound   Special Requests 06/18/2018  9:49 AM 75   Specimen collected in eSwab transport (blue cap)   Culture Micro (Abnormal) 06/18/2018  9:49    Moderate growth   Beta hemolytic Streptococcus group C      Culture Micro (Abnormal) 06/18/2018  9:49    Moderate growth   Staphylococcus aureus      Culture Micro 06/18/2018  9:49    Light growth   Normal skin shree      Culture & Susceptibility   BETA HEMOLYTIC STREPTOCOCCUS GROUP C   Antibiotic Interpretation Sensitivity Unit Method Status   AMPICILLIN Sensitive <=0.06 ug/mL CARLOS Final   CEFOTAXIME Sensitive <=0.25 ug/mL CARLOS Final   CEFTRIAXONE Sensitive <=0.25 ug/mL CARLOS Final   CLINDAMYCIN Sensitive <=0.06 ug/mL CARLOS Final   ERYTHROMYCIN Sensitive <=0.06 ug/mL CARLOS Final   PENICILLIN Sensitive <=0.03 ug/mL CARLOS Final   VANCOMYCIN Sensitive 0.25 ug/mL CARLOS Final         STAPHYLOCOCCUS AUREUS   Antibiotic  Interpretation Sensitivity Unit Method Status   CLINDAMYCIN Sensitive <=0.25 ug/mL CARLOS Final   ERYTHROMYCIN Sensitive 0.5 ug/mL CARLOS Final   GENTAMICIN Sensitive <=0.5 ug/mL CARLOS Final   OXACILLIN Sensitive 0.5 ug/mL CARLOS Final   PENICILLIN Resistant  ug/mL CARLOS Final   TETRACYCLINE Sensitive <=1 ug/mL CARLOS Final   Trimethoprim/Sulfa Sensitive <=0.5/9.5 ug/mL CARLOS Final   VANCOMYCIN Sensitive <=0.5 ug/mL CARLOS Final               A wound is noted at right  plantar 1st interspace measuring 0.4cm x 0.4cm x 0.3cm.    Cardenas Classification: 2    Wound base: Pink/Granulation    Edges: intact    Drainage: moderate/purulent    Odor: no    Undermining: no    Bone Exposure: No    Clinical Signs of Infection: Yes: purulence and cellulitis    After obtaining patient consent, the wound was irrigated with copious amounts of saline. A scalpel was then used to debride the wound into the dermal tissue. The wound edges were debrided to healthy, bleeding tissue. Given the patient's lack of sensation, no anesthesia was necessary for the procedure.       Assessment: Unfortunately, when I checked his dressing off today, moderate amounts of purulent drainage was noted.  This is continually happening with this wound.  At this juncture, I am concerned there is a nidus with in her foot that I cannot get to on the floor.  I have tried to locally block this area and have removed the inclusion cyst.  However, she continues to get purulent drainage and cellulitis.  I have changed her antibiotics to clindamycin today.  With his new culture results, will change the antibiotics to clindamycin.  I have instructed her daughter to continually watch the area.  The erythema was outlined today.  If by tomorrow, the erythema has not progressed from the line, I have instructed them to go to the emergency room for failure of outpatient antibiotics.  They relate they will do this.    Plan:   - Pt seen and evaluated  -Wound was debrided as described.  Again, I  released all the purulence that I can get to.  I flushed the area deeply with 500 cc of normal sterile saline.  There were no deeper pockets noted with probing.  As discussed, I think that there is a nidus in his foot somewhere.  Because of this, I have referred her to see Dr. Gautam.  She has an appointment with him on Tuesday.  I think that she will likely need to have an I&D to the area.  This could be performed as an outpatient.  This is as long as she does better on clindamycin.  - Pt to return to clinic in 4 days to see Dr. Gautam.

## 2018-06-22 NOTE — LETTER
6/22/2018       RE: Supriya Herr  [address]  [facility] [address]     Dear Colleague,    Thank you for referring your patient, Supriya Herr, to the [facility]. Please see a copy of my visit note below.    Chief Complaint:   Chief Complaint   Patient presents with     RECHECK     Follow up on right foot          Allergies   Allergen Reactions     Nkda [No Known Drug Allergies]          Subjective: Supriya is a 68 year old female who presents to the clinic today for a follow up of right foot ulceration.  She presents with her daughter today.  Her daughter relates that they have been using iodoform in the area.  Supriya relates that the pain has decreased some.  However, she does relate that there is some redness around the toes.    Objective  Data Unavailable Data Unavailable Data Unavailable Data Unavailable Data Unavailable 0 lbs 0 oz     Pathology:  FINAL DIAGNOSIS:   SOFT TISSUE, RIGHT FIRST INTERSPACE, EXCISION:   - Keratin without cyst lining, see comment.   - No evidence of malignancy.     COMMENT:   The specimen is consistent with content of an epidermal inclusion cyst or   compact keratin shavings. No cyst   lining is present in this specimen.    Cx:  Component Results   Component Collected Lab   Specimen Description 06/18/2018  9:49    Right Foot Wound   Special Requests 06/18/2018  9:49 AM 75   Specimen collected in eSwab transport (blue cap)   Culture Micro (Abnormal) 06/18/2018  9:49    Moderate growth   Beta hemolytic Streptococcus group C      Culture Micro (Abnormal) 06/18/2018  9:49    Moderate growth   Staphylococcus aureus      Culture Micro 06/18/2018  9:49    Light growth   Normal skin shree      Culture & Susceptibility   BETA HEMOLYTIC STREPTOCOCCUS GROUP C   Antibiotic Interpretation Sensitivity Unit Method Status   AMPICILLIN Sensitive <=0.06 ug/mL CARLOS Final   CEFOTAXIME Sensitive <=0.25  ug/mL CARLOS Final   CEFTRIAXONE Sensitive <=0.25 ug/mL CARLOS Final   CLINDAMYCIN Sensitive <=0.06 ug/mL CARLOS Final   ERYTHROMYCIN Sensitive <=0.06 ug/mL CARLOS Final   PENICILLIN Sensitive <=0.03 ug/mL CARLOS Final   VANCOMYCIN Sensitive 0.25 ug/mL CARLOS Final         STAPHYLOCOCCUS AUREUS   Antibiotic Interpretation Sensitivity Unit Method Status   CLINDAMYCIN Sensitive <=0.25 ug/mL CARLOS Final   ERYTHROMYCIN Sensitive 0.5 ug/mL CARLOS Final   GENTAMICIN Sensitive <=0.5 ug/mL CARLOS Final   OXACILLIN Sensitive 0.5 ug/mL CARLOS Final   PENICILLIN Resistant  ug/mL CARLOS Final   TETRACYCLINE Sensitive <=1 ug/mL CARLOS Final   Trimethoprim/Sulfa Sensitive <=0.5/9.5 ug/mL CARLOS Final   VANCOMYCIN Sensitive <=0.5 ug/mL CARLOS Final               A wound is noted at right  plantar 1st interspace measuring 0.4cm x 0.4cm x 0.3cm.    Cardenas Classification: 2    Wound base: Pink/Granulation    Edges: intact    Drainage: moderate/purulent    Odor: no    Undermining: no    Bone Exposure: No    Clinical Signs of Infection: Yes: purulence and cellulitis    After obtaining patient consent, the wound was irrigated with copious amounts of saline. A scalpel was then used to debride the wound into the dermal tissue. The wound edges were debrided to healthy, bleeding tissue. Given the patient's lack of sensation, no anesthesia was necessary for the procedure.       Assessment: Unfortunately, when I checked his dressing off today, moderate amounts of purulent drainage was noted.  This is continually happening with this wound.  At this juncture, I am concerned there is a nidus with in her foot that I cannot get to on the floor.  I have tried to locally block this area and have removed the inclusion cyst.  However, she continues to get purulent drainage and cellulitis.  I have changed her antibiotics to clindamycin today.  With his new culture results, will change the antibiotics to clindamycin.  I have instructed her daughter to continually watch the area.  The erythema  was outlined today.  If by tomorrow, the erythema has not progressed from the line, I have instructed them to go to the emergency room for failure of outpatient antibiotics.  They relate they will do this.    Plan:   - Pt seen and evaluated  -Wound was debrided as described.  Again, I released all the purulence that I can get to.  I flushed the area deeply with 500 cc of normal sterile saline.  There were no deeper pockets noted with probing.  As discussed, I think that there is a nidus in his foot somewhere.  Because of this, I have referred her to see Dr. Gautam.  She has an appointment with him on Tuesday.  I think that she will likely need to have an I&D to the area.  This could be performed as an outpatient.  This is as long as she does better on clindamycin.  - Pt to return to clinic in 4 days to see Dr. Gautam.      Again, thank you for allowing me to participate in the care of your patient.      Sincerely,    Eleazar Pack DPM
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
## 2018-06-23 ENCOUNTER — HOSPITAL ENCOUNTER (INPATIENT)
Facility: CLINIC | Age: 68
LOS: 4 days | Discharge: HOME HEALTH | DRG: 638 | End: 2018-06-28
Attending: EMERGENCY MEDICINE | Admitting: HOSPITALIST
Payer: MEDICARE

## 2018-06-23 DIAGNOSIS — E11.621 TYPE 2 DIABETES MELLITUS WITH FOOT ULCER (CODE) (H): ICD-10-CM

## 2018-06-23 DIAGNOSIS — N18.6 TYPE 2 DIABETES MELLITUS WITH ESRD (END-STAGE RENAL DISEASE) (H): ICD-10-CM

## 2018-06-23 DIAGNOSIS — E11.621 DIABETIC ULCER OF TOE OF RIGHT FOOT ASSOCIATED WITH TYPE 2 DIABETES MELLITUS, WITH OTHER ULCER SEVERITY (H): ICD-10-CM

## 2018-06-23 DIAGNOSIS — E11.22 TYPE 2 DIABETES MELLITUS WITH ESRD (END-STAGE RENAL DISEASE) (H): ICD-10-CM

## 2018-06-23 DIAGNOSIS — L97.518 NON-PRESSURE CHRONIC ULCER OF OTHER PART OF RIGHT FOOT WITH OTHER SPECIFIED SEVERITY (H): ICD-10-CM

## 2018-06-23 DIAGNOSIS — L97.518 DIABETIC ULCER OF TOE OF RIGHT FOOT ASSOCIATED WITH TYPE 2 DIABETES MELLITUS, WITH OTHER ULCER SEVERITY (H): ICD-10-CM

## 2018-06-23 DIAGNOSIS — N18.6 END STAGE KIDNEY DISEASE (H): ICD-10-CM

## 2018-06-23 PROCEDURE — 25000128 H RX IP 250 OP 636: Performed by: EMERGENCY MEDICINE

## 2018-06-23 PROCEDURE — 85025 COMPLETE CBC W/AUTO DIFF WBC: CPT | Performed by: EMERGENCY MEDICINE

## 2018-06-23 PROCEDURE — 87040 BLOOD CULTURE FOR BACTERIA: CPT | Performed by: EMERGENCY MEDICINE

## 2018-06-23 PROCEDURE — 99285 EMERGENCY DEPT VISIT HI MDM: CPT | Mod: 25 | Performed by: EMERGENCY MEDICINE

## 2018-06-23 PROCEDURE — 96365 THER/PROPH/DIAG IV INF INIT: CPT | Performed by: EMERGENCY MEDICINE

## 2018-06-23 PROCEDURE — 80053 COMPREHEN METABOLIC PANEL: CPT | Performed by: EMERGENCY MEDICINE

## 2018-06-23 PROCEDURE — 85610 PROTHROMBIN TIME: CPT | Performed by: EMERGENCY MEDICINE

## 2018-06-23 PROCEDURE — 99285 EMERGENCY DEPT VISIT HI MDM: CPT | Mod: Z6 | Performed by: EMERGENCY MEDICINE

## 2018-06-23 PROCEDURE — 86140 C-REACTIVE PROTEIN: CPT | Performed by: EMERGENCY MEDICINE

## 2018-06-23 PROCEDURE — 96366 THER/PROPH/DIAG IV INF ADDON: CPT | Performed by: EMERGENCY MEDICINE

## 2018-06-23 PROCEDURE — 85652 RBC SED RATE AUTOMATED: CPT | Performed by: EMERGENCY MEDICINE

## 2018-06-23 RX ORDER — CEFAZOLIN SODIUM 1 G/50ML
15 SOLUTION INTRAVENOUS ONCE
Status: COMPLETED | OUTPATIENT
Start: 2018-06-23 | End: 2018-06-24

## 2018-06-23 RX ORDER — CALCIUM CARBONATE 500 MG/1
500 TABLET, CHEWABLE ORAL ONCE
Status: COMPLETED | OUTPATIENT
Start: 2018-06-23 | End: 2018-06-24

## 2018-06-23 RX ADMIN — VANCOMYCIN HYDROCHLORIDE 1250 MG: 1 INJECTION, POWDER, LYOPHILIZED, FOR SOLUTION INTRAVENOUS at 23:58

## 2018-06-23 NOTE — LETTER
Transition Communication Hand-off for Care Transitions to Next Level of Care Provider    Name: Supriya Herr  : 1950  MRN #: [id number]  Primary Care Provider: Noam Jackson     Primary Clinic: [address] [facility] [address]  [facility] [address]     Reason for Hospitalization:  End stage kidney disease (H) [N18.6]  Diabetic ulcer of toe of right foot associated with type 2 diabetes mellitus, with other ulcer severity (H) [E11.621, L97.518]  Admit Date/Time: 2018 10:38 PM  Discharge Date: 18  Payor Source: Payor: MEDICARE / Plan: MEDICARE / Product Type: Medicare /     Reason for Communication Hand-off Referral: continuation of care    Discharge Plan: home with [facility] and [facility] for twice daily unasyn       Concern for non-adherence with plan of care:   Y/N n  Discharge Needs Assessment:  Needs       Most Recent Value    Equipment Currently Used at Home wheelchair, manual, other (see comments), shower chair [hospital bed]    Transportation Available family or friend will provide, other (see comments) [Pt takes [employer] 2x/week to adult Day Program.]          Already enrolled in Tele-monitoring program and name of program:    Follow-up specialty is recommended: Yes    Follow-up plan:  Future Appointments  Date Time Provider Department Center   2018 6:00 AM Kelly Guerrero, PT [facility]   2018 1:15 PM Jalyn Rodriguez RN [facility]   7/10/2018 11:00 AM Ravin King MD [facility]   2018 3:15 PM Kathryn Basilio MD [facility]   2018 9:00 AM Ewelina Chen MD [facility]       Any outstanding tests or procedures:        Referrals     Future Labs/Procedures    Home care nursing referral     Comments:    [facility]   [phone]  Fax [phone]    RN skilled nursing visit. RN to assess vital signs and weight, respiratory and cardiac status, pain level and activity tolerance, hydration, nutrition and  bowel status and home safety.  PICC line dressing changes    Your provider has ordered home care nursing services. If you have not been contacted within 2 days of your discharge please call the inpatient department phone number at [phone] .    Home Care OT Referral for Hospital Discharge     Comments:    [facility] [phone]  Fax [phone]    OT to eval and treat    Your provider has ordered home care - occupational therapy. If you have not been contacted within 2 days of your discharge please call the department phone number listed on the top of this document.    Home Care PT Referral for Hospital Discharge     Comments:    [facility] [phone]  Fax [phone]    PT to eval and treat    Your provider has ordered home care - physical therapy. If you have not been contacted within 2 days of your discharge please call the department phone number listed on the top of this document.    Home infusion referral     Comments:    Your provider has referred you to: [facility]: [facility] [phone]   [email or web address]    Local Address (if different from home address): N/A    Anticipated Length of Therapy: per MD order    Home Infusion Pharmacist to adjust therapy based on labs and clinical assessments: Yes    Labs:  May draw labs from Venous Catheter: Yes  Home Infusion Pharmacist to order labs based on therapy type and clinical assessments: Yes  Call/Fax Lab Results to: [facility]  Appointments:   [phone]     Agency Staff to assess nursing needs for Infusion Therapy.    Access Device Management:  IV Access Type: PICC  Flush with Heparin and Normal Saline IVP PRN and routine site care (per agency protocol) to maintain access device? Yes    Medication Therapy Management Referral     Comments:    MTM referral reason            Patient had a hospital or ED visit in last 6 months and has more than 10   PTA  or Discharge medications    Patient has 5 PTA or Discharge Medications AND one of the following   diagnoses: DM,HF,COPD,AMI DX,PULM HTN       This service is designed to help you get the most from your medications.  A specially trained pharmacist will work closely with you and your doctors  to solve any problems related to your medications and to help you get the   best results from taking them.      The Medication Therapy Management staff will call you to schedule an appointment.            Key Recommendations:      Gabbi Rangel    AVS/Discharge Summary is the source of truth; this is a helpful guide for improved communication of patient story

## 2018-06-23 NOTE — IP AVS SNAPSHOT
MRN:[id number]                      After Visit Summary   6/23/2018    Supriya Herr    MRN: [id number]           Thank you!     Thank you for choosing [facility] for your care. Our goal is always to provide you with excellent care. Hearing back from our patients is one way we can continue to improve our services. Please take a few minutes to complete the written survey that you may receive in the mail after you visit with us. Thank you!        Patient Information     Date Of Birth          1950        Designated Caregiver       Most Recent Value    Caregiver    Will someone help with your care after discharge? yes    Name of designated caregiver Caroline Sandoval [(daughter)]    Phone number of caregiver [phone]    Caregiver address Same as pt.      About your hospital stay     You were admitted on:  June 24, 2018 You last received care in the:  [address] [place] [address]    You were discharged on:  June 28, 2018        Reason for your hospital stay       Presumed osteomyelitis requiring IV antibiotics.                  Who to Call     For medical emergencies, please call 911.  For non-urgent questions about your medical care, please call your primary care provider or clinic, [phone]          Attending Provider     Provider Specialty    Catia Armstrong MD Emergency Medicine    Rachael, Shyam Plascencia MD Emergency Medicine    Finesse Lamar MD Internal Medicine    Davey Vasquez MD Internal Medicine    [facility], Barron Suárez MD Internal Medicine       Primary Care Provider Office Phone # Fax #    Noam Luis Jackson [phone]      After Care Instructions     Activity       Your activity upon discharge: activity as tolerated            Diet       Follow this diet upon discharge: Orders Placed This Encounter      Moderate Consistent CHO Diet                  Follow-up Appointments     Adult [facility] Follow-up and recommended labs and tests       1. Check CRP,  CBC and BMP weekly while on therapy and have results faxed to the [facility] at [phone].   2. Dr. Chen of ID on 8/2/18 at 9:00 AM.   3. NOTE: Patient will need ongoing antibiotic suppression for pin in toe. Do not stop antibiotics without discussion with ID.   4.         Dr Gautam within 1 week  5.         Primary care physician in the next 1-2 weeks. Hospital follow up.    Appointments on [place] and/or [facility] (with [facility] or [facility] provider or service). Call [phone] if you haven't heard regarding these appointments within 7 days of discharge.                  Your next 10 appointments already scheduled     Jun 29, 2018  1:15 PM CDT   Peripherally Inserted Central Catheter (PICC) & IV Med - [address] [place] [address] [address] with Jalyn Rodriguez, RN   [facility] [place] ([facility], [facility])    [address] [address]              Appointment is located at [address] [facility] [address] [place] [address]            Jul 05, 2018  9:00 AM CDT   (Arrive by 8:45 AM)   New Patient Visit with Zhane Blanc PA-C   [facility] Wound [facility] ([facility])    [address]  4th Floor  [facility] [id number] [phone]            Jul 10, 2018 11:00 AM CDT   (Arrive by 10:45 AM)   NEW LUNG NODULE with Ravin King MD   [facility] ([facility])    [address]  [address]  [facility] [address] [phone]            Jul 18, 2018  3:15 PM CDT   (Arrive by 2:45 PM)   Return Visit with Kathryn Basilio MD   [facility] Nephrology ([facility])    [address]  [address]  [facility] [id number] [phone]            Aug 02, 2018  9:00 AM CDT   (Arrive by 8:45 AM)   Return Visit with Ewelina Chen MD   [facility] and Infectious Diseases ([facility])    [address]  [address]  [facility] [address] [phone]              Additional Services     Home Care OT Referral for Hospital Discharge       [facility] [phone]  Fax [phone]    OT to eval and treat    Your provider has ordered home care - occupational therapy. If you have not been contacted within 2 days of your discharge please call the department phone number listed on the top of this document.            Home Care PT Referral for Hospital Discharge       [facility] [phone]  Fax [phone]    PT to eval and treat    Your provider has ordered home care - physical therapy. If you have not been contacted within 2 days of your discharge please call the department phone number listed on the top of this document.            Home care nursing referral       [facility] [phone]  Fax [phone]    RN skilled nursing visit. RN to assess vital signs and weight, respiratory and cardiac status, pain level and activity tolerance, hydration, nutrition and bowel status and home safety.  PICC line dressing changes    Your provider has ordered home care nursing services. If you have not been contacted within 2 days of your discharge please call the inpatient department phone number at [phone] .            Home infusion referral       Your provider has referred you to: [facility]: [facility] [phone]   [email or web address]    Local Address (if different from home address): N/A    Anticipated Length of Therapy: per MD order    Home Infusion Pharmacist to adjust therapy based on labs and clinical assessments: Yes    Labs:  May draw labs from Venous Catheter: Yes  Home Infusion Pharmacist to order labs based on therapy type and clinical assessments: Yes  Call/Fax Lab Results to: [facility]  Appointments:   [phone]     Agency Staff to assess nursing needs for Infusion  Therapy.    Access Device Management:  IV Access Type: PICC  Flush with Heparin and Normal Saline IVP PRN and routine site care (per agency protocol) to maintain access device? Yes            Medication Therapy Management Referral       MTM referral reason            Patient had a hospital or ED visit in last 6 months and has more than 10   PTA or Discharge medications    Patient has 5 PTA or Discharge Medications AND one of the following   diagnoses: DM,HF,COPD,AMI DX,PULM HTN       This service is designed to help you get the most from your medications.  A specially trained pharmacist will work closely with you and your doctors  to solve any problems related to your medications and to help you get the   best results from taking them.      The Medication Therapy Management staff will call you to schedule an appointment.                  Pending Results     Date and Time Order Name Status Description    6/23/2018 2316 Blood culture Preliminary     6/23/2018 2316 Blood culture Preliminary             Statement of Approval     Ordered          06/28/18 1974  I have reviewed and agree with all the recommendations and orders detailed in this document.  EFFECTIVE NOW     Approved and electronically signed by:  Barron Trammell MD             Admission Information     Date & Time Provider Department Dept. Phone    6/23/2018 Barron Trammell MD [address] [facility] [place] [phone]      Your Vitals Were     Blood Pressure Pulse Temperature Respirations Weight Pulse Oximetry    177/60 (BP Location: Right arm) 56 98.3  F (36.8  C) (Oral) 16 86.6 kg (191 lb) 97%    BMI (Body Mass Index)                   31.78 kg/m2           Care EveryWhere ID     This is your Care EveryWhere ID. This could be used by other organizations to access your [facility] medical records  [id number]        Equal Access to Services     [facility] BRIANA AH: Emeterio Lam, kristin crocker, madeline almodovar  padmajaevercl fountain'aan ah. So [facility] [phone].    ATENCIÓN: Si carolina ochoa, tiene a santoro disposición servicios gratuitos de asistencia lingüística. Renuka al [phone].    We comply with applicable federal civil rights laws and [place] laws. We do not discriminate on the basis of race, color, national origin, age, disability, sex, sexual orientation, or gender identity.               Review of your medicines      START taking        Dose / Directions    acetaminophen 325 MG tablet   Commonly known as:  TYLENOL   Used for:  Diabetic ulcer of toe of right foot associated with type 2 diabetes mellitus, with other ulcer severity (H)        Dose:  650 mg   Take 2 tablets (650 mg) by mouth every 4 hours as needed for mild pain   Quantity:  100 tablet   Refills:  0       ampicillin-sulbactam 3 (2-1) g Solr vial   Commonly known as:  UNASYN   Indication:  diabetic foot infection   Used for:  Diabetic ulcer of toe of right foot associated with type 2 diabetes mellitus, with other ulcer severity (H)        Dose:  3 g   Inject 3 g into the vein every 12 hours   Quantity:  82 each   Refills:  0         CONTINUE these medicines which have NOT CHANGED        Dose / Directions    ACE/ARB/ARNI NOT PRESCRIBED (INTENTIONAL)   Used for:  CKD (chronic kidney disease) stage 5, GFR less than 15 ml/min (H)        Please choose reason not prescribed, below   Refills:  0       albuterol 108 (90 Base) MCG/ACT Inhaler   Commonly known as:  PROAIR HFA/PROVENTIL HFA/VENTOLIN HFA   Used for:  Cough        Dose:  2 puff   Inhale 2 puffs into the lungs every 6 hours as needed for shortness of breath / dyspnea or wheezing   Quantity:  3 Inhaler   Refills:  1       atorvastatin 10 MG tablet   Commonly known as:  LIPITOR   Used for:  Hyperlipidemia LDL goal <100        Dose:  10 mg   Take 1 tablet (10 mg) by mouth daily   Quantity:  30 tablet   Refills:  0       * blood glucose monitoring test strip   Commonly known as:  ONETOUCH ULTRA   Used for:  Type  2 diabetes mellitus with stage 3 chronic kidney disease, without long-term current use of insulin (H)        Use to test blood sugars 2 times daily or as directed.   Quantity:  200 strip   Refills:  3       * blood glucose monitoring test strip   Commonly known as:  ONETOUCH ULTRA   Used for:  Type 2 diabetes mellitus with hyperglycemia, with long-term current use of insulin (H)        Test your blood sugar 3-4 times per day.   Quantity:  200 strip   Refills:  3       * CALCITRIOL PO        Take by mouth daily Unsure of dosage   Refills:  0       * calcitRIOL 0.25 MCG capsule   Commonly known as:  ROCALTROL   Used for:  Vitamin D deficiency        TAKE 1 CAPSULE (0.25 MCG) BY MOUTH DAILY   Quantity:  30 capsule   Refills:  3       carvedilol 12.5 MG tablet   Commonly known as:  COREG   Used for:  Essential hypertension with goal blood pressure less than 140/90        Dose:  12.5 mg   Take 1 tablet (12.5 mg) by mouth 2 times daily (with meals)   Quantity:  60 tablet   Refills:  11       clindamycin 1 % lotion   Commonly known as:  CLEOCIN T   Used for:  Intertrigo        Apply topically 2 times daily   Quantity:  60 mL   Refills:  3       clindamycin 300 MG capsule   Commonly known as:  CLEOCIN   Used for:  Cellulitis of toe of right foot        Dose:  300 mg   Take 1 capsule (300 mg) by mouth 3 times daily   Quantity:  21 capsule   Refills:  0       furosemide 40 MG tablet   Commonly known as:  LASIX   Used for:  Lymphedema of both lower extremities        Dose:  40 mg   Take 1 tablet (40 mg) by mouth 2 times daily   Quantity:  30 tablet   Refills:  11       hydrocortisone 1 % cream   Commonly known as:  CORTAID   Used for:  Intertrigo        Apply sparingly to affected area two times daily for 14 days.   Quantity:  60 g   Refills:  3       insulin glargine 100 UNIT/ML injection   Commonly known as:  LANTUS   Used for:  Type 2 diabetes mellitus with diabetic neuropathy, with long-term current use of insulin (H)         Dose:  20 Units   Inject 20 Units Subcutaneous At Bedtime   Quantity:  3 mL   Refills:  1       insulin pen needle 31G X 6 MM   Commonly known as:  ULTICARE MINI   Used for:  Type 2 diabetes, HbA1c goal < 7% (H)        Use daily or as directed.   Quantity:  100 each   Refills:  prn       miconazole 2 % powder   Commonly known as:  MICATIN; MICRO GUARD   Used for:  Fungal dermatitis        Apply topically 2 times daily   Quantity:  71 g   Refills:  0       MULTIVITAMIN PO        1 tablet by mouth daily   Refills:  0       NovoLOG FLEXPEN 100 UNIT/ML injection   Used for:  Type 2 diabetes mellitus with hyperglycemia, with long-term current use of insulin (H)   Generic drug:  insulin aspart        Inject 4 units SQ with breakfast, lunch and dinner.   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   Quantity:  1 Units   Refills:  0       order for DME   Used for:  Seasonal affective disorder (H)        1 SAD light   Quantity:  1 Device   Refills:  1       triamcinolone 0.025 %  ointment   Commonly known as:  KENALOG   Used for:  Dermatitis        Apply to sites of dermatitis under the breasts, chest, buttocks - once a day.   Quantity:  80 g   Refills:  1       Urea 20 % Crea cream   Used for:  Xerosis cutis, Tyloma        Apply topically daily   Quantity:  85 g   Refills:  3       * Notice:  This list has 7 medication(s) that are the same as other medications prescribed for you. Read the directions carefully, and ask your doctor or other care provider to review them with you.         Where to get your medicines      These medications were sent to [facility] [place], [place] - [address]  [address] [address]     Phone:  [phone]     acetaminophen 325 MG tablet         Some of these will need a paper prescription and others can be bought over the counter. Ask your nurse if you have questions.     You don't need a prescription for these medications     ampicillin-sulbactam 3 (2-1) g Solr vial                Protect others around you: Learn how to safely use, store and throw away your medicines at www.disposemymeds.org.        ANTIBIOTIC INSTRUCTION     You've Been Prescribed an Antibiotic - Now What?  Your healthcare team thinks that you or your loved one might have an infection. Some infections can be treated with antibiotics, which are powerful, life-saving drugs. Like all medications, antibiotics have side effects and should only be used when necessary. There are some important things you should know about your antibiotic treatment.      Your healthcare team may run tests before you start taking an antibiotic.    Your team may take samples (e.g., from your blood, urine or other areas) to run tests to look for bacteria. These test can be important to determine if you need an antibiotic at all and, if you do, which antibiotic will work best.      Within a few days, your healthcare team might change or even stop your antibiotic.    Your  team may start you on an antibiotic while they are working to find out what is making you sick.    Your team might change your antibiotic because test results show that a different antibiotic would be better to treat your infection.    In some cases, once your team has more information, they learn that you do not need an antibiotic at all. They may find out that you don't have an infection, or that the antibiotic you're taking won't work against your infection. For example, an infection caused by a virus can't be treated with antibiotics. Staying on an antibiotic when you don't need it is more likely to be harmful than helpful.      You may experience side effects from your antibiotic.    Like all medications, antibiotics have side effects. Some of these can be serious.    Let you healthcare team know if you have any known allergies when you are admitted to the hospital.    One significant side effect of nearly all antibiotics is the risk of severe and sometimes deadly diarrhea caused by Clostridium difficile (C. Difficile). This occurs when a person takes antibiotics because some good germs are destroyed. Antibiotic use allows C. diificile to take over, putting patients at high risk for this serious infection.    As a patient or caregiver, it is important to understand your or your loved one's antibiotic treatment. It is especially important for caregivers to speak up when patients can't speak for themselves. Here are some important questions to ask your healthcare team.    What infection is this antibiotic treating and how do you know I have that infection?    What side effects might occur from this antibiotic?    How long will I need to take this antibiotic?    Is it safe to take this antibiotic with other medications or supplements (e.g., vitamins) that I am taking?     Are there any special directions I need to know about taking this antibiotic? For example, should I take it with food?    How will I be  monitored to know whether my infection is responding to the antibiotic?    What tests may help to make sure the right antibiotic is prescribed for me?      Information provided by:  www.cdc.gov/getsmart  U.S. Department of Health and Human Services  Centers for disease Control and Prevention  National Center for Emerging and Zoonotic Infectious Diseases  Division of Healthcare Quality Promotion             Medication List: This is a list of all your medications and when to take them. Check marks below indicate your daily home schedule. Keep this list as a reference.      Medications           Morning Afternoon Evening Bedtime As Needed    ACE/ARB/ARNI NOT PRESCRIBED (INTENTIONAL)   Please choose reason not prescribed, below                                acetaminophen 325 MG tablet   Commonly known as:  TYLENOL   Take 2 tablets (650 mg) by mouth every 4 hours as needed for mild pain   Last time this was given:  650 mg on 6/27/2018  5:29 PM                                albuterol 108 (90 Base) MCG/ACT Inhaler   Commonly known as:  PROAIR HFA/PROVENTIL HFA/VENTOLIN HFA   Inhale 2 puffs into the lungs every 6 hours as needed for shortness of breath / dyspnea or wheezing                                ampicillin-sulbactam 3 (2-1) g Solr vial   Commonly known as:  UNASYN   Inject 3 g into the vein every 12 hours   Last time this was given:  3 g on 6/28/2018  4:40 PM                                atorvastatin 10 MG tablet   Commonly known as:  LIPITOR   Take 1 tablet (10 mg) by mouth daily   Last time this was given:  10 mg on 6/28/2018  8:42 AM                                * blood glucose monitoring test strip   Commonly known as:  ONETOUCH ULTRA   Use to test blood sugars 2 times daily or as directed.                                * blood glucose monitoring test strip   Commonly known as:  ONETOUCH ULTRA   Test your blood sugar 3-4 times per day.                                * CALCITRIOL PO   Take by mouth daily  Unsure of dosage   Last time this was given:  0.25 mcg on 6/27/2018  8:23 PM                                * calcitRIOL 0.25 MCG capsule   Commonly known as:  ROCALTROL   TAKE 1 CAPSULE (0.25 MCG) BY MOUTH DAILY   Last time this was given:  0.25 mcg on 6/27/2018  8:23 PM                                carvedilol 12.5 MG tablet   Commonly known as:  COREG   Take 1 tablet (12.5 mg) by mouth 2 times daily (with meals)   Last time this was given:  12.5 mg on 6/28/2018  8:42 AM                                clindamycin 1 % lotion   Commonly known as:  CLEOCIN T   Apply topically 2 times daily                                clindamycin 300 MG capsule   Commonly known as:  CLEOCIN   Take 1 capsule (300 mg) by mouth 3 times daily                                furosemide 40 MG tablet   Commonly known as:  LASIX   Take 1 tablet (40 mg) by mouth 2 times daily   Last time this was given:  40 mg on 6/28/2018  4:40 PM                                hydrocortisone 1 % cream   Commonly known as:  CORTAID   Apply sparingly to affected area two times daily for 14 days.   Last time this was given:  6/28/2018  8:49 AM                                insulin glargine 100 UNIT/ML injection   Commonly known as:  LANTUS   Inject 20 Units Subcutaneous At Bedtime   Last time this was given:  20 Units on 6/27/2018  9:38 PM                                insulin pen needle 31G X 6 MM   Commonly known as:  ULTICARE MINI   Use daily or as directed.                                miconazole 2 % powder   Commonly known as:  MICATIN; MICRO GUARD   Apply topically 2 times daily   Last time this was given:  6/28/2018  8:45 AM                                MULTIVITAMIN PO   1 tablet by mouth daily                                NovoLOG FLEXPEN 100 UNIT/ML injection   Inject 4 units SQ with breakfast, lunch and dinner.   Last time this was given:  4 Units on 6/28/2018  4:40 PM   Generic drug:  insulin aspart                                order for  DME   Equipment being ordered: Compression stockings, 20-30 MMHG, knee high                                * order for DME   Equipment being ordered: TEDS stocking  Below the knee 15-20 mg Dispense 2 Use daily                                * order for DME   1 wheelchair                                * order for DME   Equipment being ordered: Right Lower extremity Solaris Ready wrap calf piece:  Size small/length tall , knee piece: Size small , Thigh piece size small/length average.                                order for DME   1 SAD light                                triamcinolone 0.025 % ointment   Commonly known as:  KENALOG   Apply to sites of dermatitis under the breasts, chest, buttocks - once a day.   Last time this was given:  6/28/2018  8:46 AM                                Urea 20 % Crea cream   Apply topically daily                                * Notice:  This list has 7 medication(s) that are the same as other medications prescribed for you. Read the directions carefully, and ask your doctor or other care provider to review them with you.

## 2018-06-23 NOTE — IP AVS SNAPSHOT
"    [address] [facility]: [phone]                                              INTERAGENCY TRANSFER FORM - PHYSICIAN ORDERS   2018                    Hospital Admission Date: 2018  BROOKE ARANA   : 1950  Sex: Female        Attending Provider: Barron Trammell MD     Allergies:  Nkda [No Known Drug Allergies]    Infection:  None   Service:  INTERNAL MED    Ht:  1.651 m (5' 5\")   Wt:  86.6 kg (191 lb)   Admission Wt:  86.6 kg (191 lb)    BMI:  31.78 kg/m 2   BSA:  1.99 m 2            Patient PCP Information     Provider PCP Type    Noam Jackson MD General    Arabella Kelley, KEIRA, APRN CNP ctic      ED Clinical Impression     Diagnosis Description Comment Added By Time Added    Diabetic ulcer of toe of right foot associated with type 2 diabetes mellitus, with other ulcer severity (H) [E11.621, L97.518] Diabetic ulcer of toe of right foot associated with type 2 diabetes mellitus, with other ulcer severity (H) [E11.621, L97.518]  Catia Armstrong MD 2018 11:16 PM    End stage kidney disease (H) [N18.6] End stage kidney disease (H) [N18.6]  Catia Armstrong MD 2018 11:16 PM      Hospital Problems as of 2018              Priority Class Noted POA    Cellulitis Medium  2018 Yes      Non-Hospital Problems as of 2018              Priority Class Noted    Traumatic amputation of leg above knee (H) Medium  2005    Vitiligo Low  2005    Contact dermatitis and other eczema, due to unspecified cause Medium  2006    Dermatophytosis of nail Medium  2006    Generalized osteoarthrosis, unspecified site Medium  2006    Hypertension goal BP (blood pressure) < 140/90 High  2006    Mild nonproliferative diabetic retinopathy (H) Medium  2007    Proteinuria Medium  2007    Stage I pressure ulcer Medium  3/16/2009    Hyperlipidemia LDL goal <100 Medium  10/31/2010    Non compliance with medical treatment Medium  2010    " Advanced directives, counseling/discussion Low  12/8/2011    Macular cyst, hole, or pseudohole of retina Medium  2/14/2012    Incontinence of urine Medium  12/5/2012    Basal cell carcinoma Medium  5/3/2013    Senile nuclear sclerosis Medium  5/4/2013    JONE (obstructive sleep apnea) Medium  5/9/2013    CHF (congestive heart failure) (H) Medium  12/30/2013    Health Care Home Medium  2/4/2014    Type 2 diabetes, HbA1C goal < 8% (H) Medium  8/14/2015    Type 2 diabetes mellitus with diabetic chronic kidney disease (H) Medium  10/15/2015    Moderate recurrent major depression (H) Medium  12/2/2016    Type 2 diabetes mellitus with diabetic neuropathy, with long-term current use of insulin (H) Medium  2/14/2017    Traumatic amputation of left lower extremity above knee, subsequent encounter (H) Medium  2/5/2018    Former tobacco use Medium  Unknown    Type 2 diabetes mellitus (H) Medium  Unknown    Dyslipidemia Medium  Unknown    Anemia in chronic kidney disease Medium  Unknown    CKD (chronic kidney disease) stage 5, GFR less than 15 ml/min (H) Medium  Unknown    Obesity (BMI 30-39.9) Medium  Unknown    Anxiety and depression Medium  Unknown    Cervical cancer screening Medium  4/9/2018    Diabetic foot infection (H) Medium  5/19/2018    Plantar ulcer of right foot with fat layer exposed (H) Medium  5/25/2018      Code Status History     Date Active Date Inactive Code Status Order ID Comments User Context    6/28/2018 12:54 PM  Full Code [id number]  Barron Trammell MD Outpatient    6/24/2018  4:36 AM 6/28/2018 12:54 PM Full Code [id number]  Kevin Dawkins MD Inpatient    5/21/2018 11:41 AM 6/24/2018  4:36 AM Full Code [id number]  Da Justin MD Outpatient    5/19/2018  4:48 AM 5/21/2018 11:41 AM Full Code [id number]  Sana Boone MD Inpatient    1/26/2018 12:52 PM 5/19/2018  4:48 AM Full Code [id number]  Nicola Thorne MD Outpatient    1/24/2018  5:48 PM 1/26/2018 12:52 PM Full Code [id number]   Nicola Thorne MD Inpatient    11/13/2017  9:54 AM 1/24/2018  5:48 PM Full Code [id number]  Brayden Carrasco MD Outpatient    11/1/2017  9:15 PM 11/13/2017  9:54 AM Full Code [id number]  Jessi Cross MD Inpatient    12/30/2013  7:09 PM 1/2/2014  8:05 PM Full Code [id number]  Faina Medeiros RN Inpatient         Medication Review      START taking        Dose / Directions Comments    acetaminophen 325 MG tablet   Commonly known as:  TYLENOL   Used for:  Diabetic ulcer of toe of right foot associated with type 2 diabetes mellitus, with other ulcer severity (H)        Dose:  650 mg   Take 2 tablets (650 mg) by mouth every 4 hours as needed for mild pain   Quantity:  100 tablet   Refills:  0        ampicillin-sulbactam 3 (2-1) g Solr vial   Commonly known as:  UNASYN   Indication:  diabetic foot infection   Used for:  Diabetic ulcer of toe of right foot associated with type 2 diabetes mellitus, with other ulcer severity (H)        Dose:  3 g   Inject 3 g into the vein every 12 hours   Quantity:  82 each   Refills:  0    - 1. Check CRP, CBC and BMP weekly while on therapy and have results faxed to the [facility] at [phone].     - 2. NOTE: Patient will need ongoing antibiotic suppression for pin in toe. Do not stop antibiotics without discussion with ID.         CONTINUE these medications which have NOT CHANGED        Dose / Directions Comments    ACE/ARB/ARNI NOT PRESCRIBED (INTENTIONAL)   Used for:  CKD (chronic kidney disease) stage 5, GFR less than 15 ml/min (H)        Please choose reason not prescribed, below   Refills:  0        albuterol 108 (90 Base) MCG/ACT Inhaler   Commonly known as:  PROAIR HFA/PROVENTIL HFA/VENTOLIN HFA   Used for:  Cough        Dose:  2 puff   Inhale 2 puffs into the lungs every 6 hours as needed for shortness of breath / dyspnea or wheezing   Quantity:  3 Inhaler   Refills:  1        atorvastatin 10 MG tablet   Commonly known as:  LIPITOR   Used  for:  Hyperlipidemia LDL goal <100        Dose:  10 mg   Take 1 tablet (10 mg) by mouth daily   Quantity:  30 tablet   Refills:  0        * blood glucose monitoring test strip   Commonly known as:  ONETOUCH ULTRA   Used for:  Type 2 diabetes mellitus with stage 3 chronic kidney disease, without long-term current use of insulin (H)        Use to test blood sugars 2 times daily or as directed.   Quantity:  200 strip   Refills:  3        * blood glucose monitoring test strip   Commonly known as:  ONETOUCH ULTRA   Used for:  Type 2 diabetes mellitus with hyperglycemia, with long-term current use of insulin (H)        Test your blood sugar 3-4 times per day.   Quantity:  200 strip   Refills:  3        * CALCITRIOL PO        Take by mouth daily Unsure of dosage   Refills:  0        * calcitRIOL 0.25 MCG capsule   Commonly known as:  ROCALTROL   Used for:  Vitamin D deficiency        TAKE 1 CAPSULE (0.25 MCG) BY MOUTH DAILY   Quantity:  30 capsule   Refills:  3        carvedilol 12.5 MG tablet   Commonly known as:  COREG   Used for:  Essential hypertension with goal blood pressure less than 140/90        Dose:  12.5 mg   Take 1 tablet (12.5 mg) by mouth 2 times daily (with meals)   Quantity:  60 tablet   Refills:  11        clindamycin 1 % lotion   Commonly known as:  CLEOCIN T   Used for:  Intertrigo        Apply topically 2 times daily   Quantity:  60 mL   Refills:  3    Please dispense in roll-on form if possible.       clindamycin 300 MG capsule   Commonly known as:  CLEOCIN   Used for:  Cellulitis of toe of right foot        Dose:  300 mg   Take 1 capsule (300 mg) by mouth 3 times daily   Quantity:  21 capsule   Refills:  0        furosemide 40 MG tablet   Commonly known as:  LASIX   Used for:  Lymphedema of both lower extremities        Dose:  40 mg   Take 1 tablet (40 mg) by mouth 2 times daily   Quantity:  30 tablet   Refills:  11        hydrocortisone 1 % cream   Commonly known as:  CORTAID   Used for:  Intertrigo         Apply sparingly to affected area two times daily for 14 days.   Quantity:  60 g   Refills:  3        insulin glargine 100 UNIT/ML injection   Commonly known as:  LANTUS   Used for:  Type 2 diabetes mellitus with diabetic neuropathy, with long-term current use of insulin (H)        Dose:  20 Units   Inject 20 Units Subcutaneous At Bedtime   Quantity:  3 mL   Refills:  1        insulin pen needle 31G X 6 MM   Commonly known as:  ULTICARE MINI   Used for:  Type 2 diabetes, HbA1c goal < 7% (H)        Use daily or as directed.   Quantity:  100 each   Refills:  prn        miconazole 2 % powder   Commonly known as:  MICATIN; MICRO GUARD   Used for:  Fungal dermatitis        Apply topically 2 times daily   Quantity:  71 g   Refills:  0        MULTIVITAMIN PO        1 tablet by mouth daily   Refills:  0        NovoLOG FLEXPEN 100 UNIT/ML injection   Used for:  Type 2 diabetes mellitus with hyperglycemia, with long-term current use of insulin (H)   Generic drug:  insulin aspart        Inject 4 units SQ with breakfast, lunch and dinner.   Quantity:  15 mL   Refills:  3        order for DME   Used for:  Edema, Hypertension goal BP (blood pressure) < 140/90        Equipment being ordered: Compression stockings, 20-30 MMHG, knee high   Quantity:  1 Device   Refills:  0        * order for DME   Used for:  Localized edema        Equipment being ordered: TEDS stocking  Below the knee 15-20 mg Dispense 2 Use daily   Quantity:  2 Device   Refills:  1        * order for DME   Used for:  Traumatic amputation of lower extremity above knee, unspecified laterality, subsequent encounter (H), CKD (chronic kidney disease) stage 3, GFR 30-59 ml/min, Type 2 diabetes mellitus with stage 3 chronic kidney disease, without long-term current use of insulin (H)        1 wheelchair   Quantity:  1 Device   Refills:  0        * order for DME   Used for:  Edema of lower extremity        Equipment being ordered: Right Lower extremity Solaris Ready  wrap calf piece:  Size small/length tall , knee piece: Size small , Thigh piece size small/length average.   Quantity:  1 Units   Refills:  0        order for DME   Used for:  Seasonal affective disorder (H)        1 SAD light   Quantity:  1 Device   Refills:  1        triamcinolone 0.025 % ointment   Commonly known as:  KENALOG   Used for:  Dermatitis        Apply to sites of dermatitis under the breasts, chest, buttocks - once a day.   Quantity:  80 g   Refills:  1        Urea 20 % Crea cream   Used for:  Xerosis cutis, Tyloma        Apply topically daily   Quantity:  85 g   Refills:  3        * Notice:  This list has 7 medication(s) that are the same as other medications prescribed for you. Read the directions carefully, and ask your doctor or other care provider to review them with you.            Summary of Visit     Reason for your hospital stay       Presumed osteomyelitis requiring IV antibiotics.             After Care     Activity       Your activity upon discharge: activity as tolerated       Diet       Follow this diet upon discharge: Orders Placed This Encounter      Moderate Consistent CHO Diet             Referrals     Home Care OT Referral for Hospital Discharge       [facility] [phone]  Fax [phone]    OT to eval and treat    Your provider has ordered home care - occupational therapy. If you have not been contacted within 2 days of your discharge please call the department phone number listed on the top of this document.       Home Care PT Referral for Hospital Discharge       [facility] [phone]  Fax [phone]    PT to eval and treat    Your provider has ordered home care - physical therapy. If you have not been contacted within 2 days of your discharge please call the department phone number listed on the top of this document.       Home care nursing referral       [facility] [phone]  Fax [phone]    RN skilled nursing visit. RN to assess  vital signs and weight, respiratory and cardiac status, pain level and activity tolerance, hydration, nutrition and bowel status and home safety.  PICC line dressing changes    Your provider has ordered home care nursing services. If you have not been contacted within 2 days of your discharge please call the inpatient department phone number at [phone] .       Home infusion referral       Your provider has referred you to: [facility]: [facility] [phone]   [email or web address]    Local Address (if different from home address): N/A    Anticipated Length of Therapy: per MD order    Home Infusion Pharmacist to adjust therapy based on labs and clinical assessments: Yes    Labs:  May draw labs from Venous Catheter: Yes  Home Infusion Pharmacist to order labs based on therapy type and clinical assessments: Yes  Call/Fax Lab Results to: [facility]  Appointments:   [phone]     Agency Staff to assess nursing needs for Infusion Therapy.    Access Device Management:  IV Access Type: PICC  Flush with Heparin and Normal Saline IVP PRN and routine site care (per agency protocol) to maintain access device? Yes       Medication Therapy Management Referral       MTM referral reason            Patient had a hospital or ED visit in last 6 months and has more than 10   PTA or Discharge medications    Patient has 5 PTA or Discharge Medications AND one of the following   diagnoses: DM,HF,COPD,AMI DX,PULM HTN       This service is designed to help you get the most from your medications.  A specially trained pharmacist will work closely with you and your doctors  to solve any problems related to your medications and to help you get the   best results from taking them.      The Medication Therapy Management staff will call you to schedule an appointment.              MD face to face encounter       Documentation of Face to Face and Certification for  Home Health Services    I certify that patient: Supriya Herr is under my care and that I, or a nurse practitioner or physician's assistant working with me, had a face-to-face encounter that meets the physician face-to-face encounter requirements with this patient on: 6/27/2018.    This encounter with the patient was in whole, or in part, for the following medical condition, which is the primary reason for home health care: complex medical status.    I certify that, based on my findings, the following services are medically necessary home health services: Nursing, Occupational Therapy and Physical Therapy.    My clinical findings support the need for the above services because: Nurse is needed: picc line cares, assessment and treatment., Occupational Therapy Services are needed to assess and treat cognitive ability and address ADL safety due to impairment in mobility. and Physical Therapy Services are needed to assess and treat the following functional impairments: mobility.    Further, I certify that my clinical findings support that this patient is homebound (i.e. absences from home require considerable and taxing effort and are for medical reasons or [employer]ian[employer] services or infrequently or of short duration when for other reasons) because: Requires assistance of another person or specialized equipment to access medical services because patient: Requires supervision of another for safe transfer...    Based on the above findings. I certify that this patient is confined to the home and needs intermittent skilled nursing care, physical therapy and/or speech therapy.  The patient is under my care, and I have initiated the establishment of the plan of care.  This patient will be followed by a physician who will periodically review the plan of care.  Physician/Provider to provide follow up care: Noam Jackson    Attending hospital physician (the Medicare certified [place] provider): Dr. Jackie Trammell  Physician  Signature: See electronic signature associated with these discharge orders.  Date: 6/27/2018                  Your next 10 appointments already scheduled     Jun 29, 2018  1:15 PM CDT   Peripherally Inserted Central Catheter (PICC) & IV Med - [address] with Jalyn Rodriguez, RN   [facility] [place] ([facility], [facility])    [address] [address]              Appointment is located at [address] [place] [address]            Jul 05, 2018  9:00 AM CDT   (Arrive by 8:45 AM)   New Patient Visit with Zhane Blanc PA-C   [facility] ([facility])    [address]  4th Floor  [facility] [id number] [phone]            Jul 10, 2018 11:00 AM CDT   (Arrive by 10:45 AM)   NEW LUNG NODULE with Ravin King MD   [facility] ([facility])    [address]  [address]  [facility] [facility] [id number] [phone]            Jul 18, 2018  3:15 PM CDT   (Arrive by 2:45 PM)   Return Visit with Kathryn Basilio MD   [facility] Nephrology ([facility])    [address]  [address]  [facility] [facility] [address] [phone]            Aug 02, 2018  9:00 AM CDT   (Arrive by 8:45 AM)   Return Visit with Ewelina Chen MD   [facility] and Infectious Diseases ([facility])    [address]  [address]  [facility] [id number] [phone]              Follow-Up Appointment Instructions     Future Labs/Procedures    Adult [facility] Follow-up and recommended labs and tests     Comments:    1. Check CRP, CBC and BMP weekly while on therapy and have results faxed to the [facility] at [phone].   2. Dr. Chen of [place] on 8/2/18 at 9:00 AM.   3. NOTE: Patient will need ongoing antibiotic suppression for pin in toe. Do not stop antibiotics without  discussion with ID.   4.         Dr Gautam within 1 week  5.         Primary care physician in the next 1-2 weeks. Hospital follow up.    Appointments on [place] and/or [place] (with [facility] or [facility] provider or service). Call [phone] if you haven't heard regarding these appointments within 7 days of discharge.      Follow-Up Appointment Instructions     Adult [facility] Follow-up and recommended labs and tests       1. Check CRP, CBC and BMP weekly while on therapy and have results faxed to the [facility] at [phone].   2. Dr. Chen of [place] on 8/2/18 at 9:00 AM.   3. NOTE: Patient will need ongoing antibiotic suppression for pin in toe. Do not stop antibiotics without discussion with ID.   4.         Dr Gautam within 1 week  5.         Primary care physician in the next 1-2 weeks. Hospital follow up.    Appointments on [place] and/or [facility] (with [facility] or [facility] provider or service). Call [phone] if you haven't heard regarding these appointments within 7 days of discharge.             Statement of Approval     Ordered          06/28/18 1254  I have reviewed and agree with all the recommendations and orders detailed in this document.  EFFECTIVE NOW     Approved and electronically signed by:  Barron Trammell MD

## 2018-06-23 NOTE — IP AVS SNAPSHOT
[address] [address]    Phone:  [phone]                                       After Visit Summary   6/23/2018    Supriya Herr    MRN: [id number]           After Visit Summary Signature Page     I have received my discharge instructions, and my questions have been answered. I have discussed any challenges I see with this plan with the nurse or doctor.    ..........................................................................................................................................  Patient/Patient Representative Signature      ..........................................................................................................................................  Patient Representative Print Name and Relationship to Patient    ..................................................               ................................................  Date                                            Time    ..........................................................................................................................................  Reviewed by Signature/Title    ...................................................              ..............................................  Date                                                            Time

## 2018-06-24 ENCOUNTER — APPOINTMENT (OUTPATIENT)
Dept: MRI IMAGING | Facility: CLINIC | Age: 68
DRG: 638 | End: 2018-06-24
Payer: MEDICARE

## 2018-06-24 PROBLEM — L03.90 CELLULITIS: Status: ACTIVE | Noted: 2018-06-24

## 2018-06-24 LAB
ALBUMIN SERPL-MCNC: 2.3 G/DL (ref 3.4–5)
ALBUMIN SERPL-MCNC: 2.6 G/DL (ref 3.4–5)
ALP SERPL-CCNC: 49 U/L (ref 40–150)
ALP SERPL-CCNC: 56 U/L (ref 40–150)
ALT SERPL W P-5'-P-CCNC: 13 U/L (ref 0–50)
ALT SERPL W P-5'-P-CCNC: 17 U/L (ref 0–50)
ANION GAP SERPL CALCULATED.3IONS-SCNC: 10 MMOL/L (ref 3–14)
ANION GAP SERPL CALCULATED.3IONS-SCNC: 13 MMOL/L (ref 3–14)
AST SERPL W P-5'-P-CCNC: 12 U/L (ref 0–45)
AST SERPL W P-5'-P-CCNC: 16 U/L (ref 0–45)
BACTERIA SPEC CULT: NORMAL
BASOPHILS # BLD AUTO: 0 10E9/L (ref 0–0.2)
BASOPHILS NFR BLD AUTO: 0.3 %
BILIRUB SERPL-MCNC: 0.2 MG/DL (ref 0.2–1.3)
BILIRUB SERPL-MCNC: 0.2 MG/DL (ref 0.2–1.3)
BUN SERPL-MCNC: 72 MG/DL (ref 7–30)
BUN SERPL-MCNC: 78 MG/DL (ref 7–30)
CALCIUM SERPL-MCNC: 8.7 MG/DL (ref 8.5–10.1)
CALCIUM SERPL-MCNC: 8.9 MG/DL (ref 8.5–10.1)
CHLORIDE SERPL-SCNC: 110 MMOL/L (ref 94–109)
CHLORIDE SERPL-SCNC: 111 MMOL/L (ref 94–109)
CO2 SERPL-SCNC: 21 MMOL/L (ref 20–32)
CO2 SERPL-SCNC: 26 MMOL/L (ref 20–32)
CREAT SERPL-MCNC: 3.6 MG/DL (ref 0.52–1.04)
CREAT SERPL-MCNC: 3.62 MG/DL (ref 0.52–1.04)
CRP SERPL-MCNC: 5.3 MG/L (ref 0–8)
DIFFERENTIAL METHOD BLD: ABNORMAL
EOSINOPHIL # BLD AUTO: 0.3 10E9/L (ref 0–0.7)
EOSINOPHIL NFR BLD AUTO: 4 %
ERYTHROCYTE [DISTWIDTH] IN BLOOD BY AUTOMATED COUNT: 12.6 % (ref 10–15)
ERYTHROCYTE [DISTWIDTH] IN BLOOD BY AUTOMATED COUNT: 13 % (ref 10–15)
ERYTHROCYTE [SEDIMENTATION RATE] IN BLOOD BY WESTERGREN METHOD: 93 MM/H (ref 0–30)
GFR SERPL CREATININE-BSD FRML MDRD: 12 ML/MIN/1.7M2
GFR SERPL CREATININE-BSD FRML MDRD: 13 ML/MIN/1.7M2
GLUCOSE BLDC GLUCOMTR-MCNC: 106 MG/DL (ref 70–99)
GLUCOSE BLDC GLUCOMTR-MCNC: 186 MG/DL (ref 70–99)
GLUCOSE BLDC GLUCOMTR-MCNC: 83 MG/DL (ref 70–99)
GLUCOSE BLDC GLUCOMTR-MCNC: 95 MG/DL (ref 70–99)
GLUCOSE BLDC GLUCOMTR-MCNC: 96 MG/DL (ref 70–99)
GLUCOSE SERPL-MCNC: 80 MG/DL (ref 70–99)
GLUCOSE SERPL-MCNC: 99 MG/DL (ref 70–99)
GRAM STN SPEC: ABNORMAL
HCT VFR BLD AUTO: 28.7 % (ref 35–47)
HCT VFR BLD AUTO: 31 % (ref 35–47)
HGB BLD-MCNC: 10.1 G/DL (ref 11.7–15.7)
HGB BLD-MCNC: 9.6 G/DL (ref 11.7–15.7)
IMM GRANULOCYTES # BLD: 0 10E9/L (ref 0–0.4)
IMM GRANULOCYTES NFR BLD: 0.1 %
INR PPP: 0.99 (ref 0.86–1.14)
LYMPHOCYTES # BLD AUTO: 2.4 10E9/L (ref 0.8–5.3)
LYMPHOCYTES NFR BLD AUTO: 35.5 %
Lab: ABNORMAL
MCH RBC QN AUTO: 29.9 PG (ref 26.5–33)
MCH RBC QN AUTO: 30.5 PG (ref 26.5–33)
MCHC RBC AUTO-ENTMCNC: 32.6 G/DL (ref 31.5–36.5)
MCHC RBC AUTO-ENTMCNC: 33.4 G/DL (ref 31.5–36.5)
MCV RBC AUTO: 91 FL (ref 78–100)
MCV RBC AUTO: 92 FL (ref 78–100)
MONOCYTES # BLD AUTO: 0.5 10E9/L (ref 0–1.3)
MONOCYTES NFR BLD AUTO: 7.8 %
NEUTROPHILS # BLD AUTO: 3.5 10E9/L (ref 1.6–8.3)
NEUTROPHILS NFR BLD AUTO: 52.3 %
NRBC # BLD AUTO: 0 10*3/UL
NRBC BLD AUTO-RTO: 0 /100
PLATELET # BLD AUTO: 224 10E9/L (ref 150–450)
PLATELET # BLD AUTO: 266 10E9/L (ref 150–450)
POTASSIUM SERPL-SCNC: 3.6 MMOL/L (ref 3.4–5.3)
POTASSIUM SERPL-SCNC: 3.8 MMOL/L (ref 3.4–5.3)
PROT SERPL-MCNC: 6.2 G/DL (ref 6.8–8.8)
PROT SERPL-MCNC: 7.4 G/DL (ref 6.8–8.8)
RBC # BLD AUTO: 3.15 10E12/L (ref 3.8–5.2)
RBC # BLD AUTO: 3.38 10E12/L (ref 3.8–5.2)
SODIUM SERPL-SCNC: 144 MMOL/L (ref 133–144)
SODIUM SERPL-SCNC: 147 MMOL/L (ref 133–144)
SPECIMEN SOURCE: ABNORMAL
SPECIMEN SOURCE: ABNORMAL
SPECIMEN SOURCE: NORMAL
WBC # BLD AUTO: 6.7 10E9/L (ref 4–11)
WBC # BLD AUTO: 6.8 10E9/L (ref 4–11)

## 2018-06-24 PROCEDURE — 80053 COMPREHEN METABOLIC PANEL: CPT | Performed by: INTERNAL MEDICINE

## 2018-06-24 PROCEDURE — 87181 SC STD AGAR DILUTION PER AGT: CPT | Performed by: STUDENT IN AN ORGANIZED HEALTH CARE EDUCATION/TRAINING PROGRAM

## 2018-06-24 PROCEDURE — 99232 SBSQ HOSP IP/OBS MODERATE 35: CPT | Performed by: HOSPITALIST

## 2018-06-24 PROCEDURE — A9270 NON-COVERED ITEM OR SERVICE: HCPCS | Mod: GY | Performed by: NURSE PRACTITIONER

## 2018-06-24 PROCEDURE — 99207 ZZC CDG-CODE CATEGORY CHANGED: CPT | Performed by: HOSPITALIST

## 2018-06-24 PROCEDURE — 87070 CULTURE OTHR SPECIMN AEROBIC: CPT | Performed by: STUDENT IN AN ORGANIZED HEALTH CARE EDUCATION/TRAINING PROGRAM

## 2018-06-24 PROCEDURE — 21400006 ZZH R&B CCU INTERMEDIATE UMMC

## 2018-06-24 PROCEDURE — A9270 NON-COVERED ITEM OR SERVICE: HCPCS | Mod: GY | Performed by: EMERGENCY MEDICINE

## 2018-06-24 PROCEDURE — 87205 SMEAR GRAM STAIN: CPT | Performed by: STUDENT IN AN ORGANIZED HEALTH CARE EDUCATION/TRAINING PROGRAM

## 2018-06-24 PROCEDURE — 87186 SC STD MICRODIL/AGAR DIL: CPT | Performed by: STUDENT IN AN ORGANIZED HEALTH CARE EDUCATION/TRAINING PROGRAM

## 2018-06-24 PROCEDURE — 00000146 ZZHCL STATISTIC GLUCOSE BY METER IP

## 2018-06-24 PROCEDURE — 85027 COMPLETE CBC AUTOMATED: CPT | Performed by: INTERNAL MEDICINE

## 2018-06-24 PROCEDURE — 0HDMXZZ EXTRACTION OF RIGHT FOOT SKIN, EXTERNAL APPROACH: ICD-10-PCS | Performed by: ORTHOPAEDIC SURGERY

## 2018-06-24 PROCEDURE — 25000131 ZZH RX MED GY IP 250 OP 636 PS 637: Mod: GY | Performed by: INTERNAL MEDICINE

## 2018-06-24 PROCEDURE — 87077 CULTURE AEROBIC IDENTIFY: CPT | Performed by: STUDENT IN AN ORGANIZED HEALTH CARE EDUCATION/TRAINING PROGRAM

## 2018-06-24 PROCEDURE — 88305 TISSUE EXAM BY PATHOLOGIST: CPT | Performed by: STUDENT IN AN ORGANIZED HEALTH CARE EDUCATION/TRAINING PROGRAM

## 2018-06-24 PROCEDURE — 25000125 ZZHC RX 250: Performed by: STUDENT IN AN ORGANIZED HEALTH CARE EDUCATION/TRAINING PROGRAM

## 2018-06-24 PROCEDURE — A9270 NON-COVERED ITEM OR SERVICE: HCPCS | Mod: GY | Performed by: INTERNAL MEDICINE

## 2018-06-24 PROCEDURE — A9270 NON-COVERED ITEM OR SERVICE: HCPCS | Mod: GY | Performed by: HOSPITALIST

## 2018-06-24 PROCEDURE — 87176 TISSUE HOMOGENIZATION CULTR: CPT | Performed by: STUDENT IN AN ORGANIZED HEALTH CARE EDUCATION/TRAINING PROGRAM

## 2018-06-24 PROCEDURE — 25000132 ZZH RX MED GY IP 250 OP 250 PS 637: Mod: GY | Performed by: NURSE PRACTITIONER

## 2018-06-24 PROCEDURE — 36415 COLL VENOUS BLD VENIPUNCTURE: CPT | Performed by: INTERNAL MEDICINE

## 2018-06-24 PROCEDURE — 40000556 ZZH STATISTIC PERIPHERAL IV START W US GUIDANCE

## 2018-06-24 PROCEDURE — 73718 MRI LOWER EXTREMITY W/O DYE: CPT | Mod: RT

## 2018-06-24 PROCEDURE — 25000132 ZZH RX MED GY IP 250 OP 250 PS 637: Mod: GY | Performed by: INTERNAL MEDICINE

## 2018-06-24 PROCEDURE — 25000132 ZZH RX MED GY IP 250 OP 250 PS 637: Mod: GY | Performed by: EMERGENCY MEDICINE

## 2018-06-24 PROCEDURE — 88312 SPECIAL STAINS GROUP 1: CPT | Performed by: STUDENT IN AN ORGANIZED HEALTH CARE EDUCATION/TRAINING PROGRAM

## 2018-06-24 PROCEDURE — 25000132 ZZH RX MED GY IP 250 OP 250 PS 637: Mod: GY | Performed by: HOSPITALIST

## 2018-06-24 PROCEDURE — 27210995 ZZH RX 272

## 2018-06-24 RX ORDER — CARVEDILOL 12.5 MG/1
12.5 TABLET ORAL 2 TIMES DAILY WITH MEALS
Status: DISCONTINUED | OUTPATIENT
Start: 2018-06-24 | End: 2018-06-28 | Stop reason: HOSPADM

## 2018-06-24 RX ORDER — FUROSEMIDE 40 MG
40 TABLET ORAL
Status: DISCONTINUED | OUTPATIENT
Start: 2018-06-25 | End: 2018-06-25

## 2018-06-24 RX ORDER — FUROSEMIDE 20 MG
20 TABLET ORAL ONCE
Status: COMPLETED | OUTPATIENT
Start: 2018-06-24 | End: 2018-06-24

## 2018-06-24 RX ORDER — AMPICILLIN AND SULBACTAM 1; .5 G/1; G/1
1.5 INJECTION, POWDER, FOR SOLUTION INTRAMUSCULAR; INTRAVENOUS EVERY 12 HOURS
Status: DISCONTINUED | OUTPATIENT
Start: 2018-06-24 | End: 2018-06-24

## 2018-06-24 RX ORDER — MAGNESIUM HYDROXIDE 1200 MG/15ML
LIQUID ORAL
Status: COMPLETED
Start: 2018-06-24 | End: 2018-06-24

## 2018-06-24 RX ORDER — DEXTROSE MONOHYDRATE 25 G/50ML
25-50 INJECTION, SOLUTION INTRAVENOUS
Status: DISCONTINUED | OUTPATIENT
Start: 2018-06-24 | End: 2018-06-28 | Stop reason: HOSPADM

## 2018-06-24 RX ORDER — MULTIPLE VITAMINS W/ MINERALS TAB 9MG-400MCG
1 TAB ORAL DAILY
Status: DISCONTINUED | OUTPATIENT
Start: 2018-06-24 | End: 2018-06-28 | Stop reason: HOSPADM

## 2018-06-24 RX ORDER — ACETAMINOPHEN 325 MG/1
650 TABLET ORAL EVERY 4 HOURS PRN
Status: DISCONTINUED | OUTPATIENT
Start: 2018-06-24 | End: 2018-06-28 | Stop reason: HOSPADM

## 2018-06-24 RX ORDER — AMPICILLIN AND SULBACTAM 2; 1 G/1; G/1
3 INJECTION, POWDER, FOR SOLUTION INTRAMUSCULAR; INTRAVENOUS EVERY 12 HOURS
Status: DISCONTINUED | OUTPATIENT
Start: 2018-06-25 | End: 2018-06-28 | Stop reason: HOSPADM

## 2018-06-24 RX ORDER — ATORVASTATIN CALCIUM 10 MG/1
10 TABLET, FILM COATED ORAL DAILY
Status: DISCONTINUED | OUTPATIENT
Start: 2018-06-24 | End: 2018-06-28 | Stop reason: HOSPADM

## 2018-06-24 RX ORDER — LIDOCAINE 40 MG/G
CREAM TOPICAL
Status: DISCONTINUED | OUTPATIENT
Start: 2018-06-24 | End: 2018-06-28 | Stop reason: HOSPADM

## 2018-06-24 RX ORDER — LIDOCAINE HYDROCHLORIDE 10 MG/ML
20 INJECTION, SOLUTION EPIDURAL; INFILTRATION; INTRACAUDAL; PERINEURAL ONCE
Status: COMPLETED | OUTPATIENT
Start: 2018-06-24 | End: 2018-06-24

## 2018-06-24 RX ORDER — FUROSEMIDE 40 MG
40 TABLET ORAL DAILY
Status: DISCONTINUED | OUTPATIENT
Start: 2018-06-24 | End: 2018-06-24

## 2018-06-24 RX ORDER — CALCITRIOL 0.25 UG/1
0.25 CAPSULE, LIQUID FILLED ORAL DAILY
Status: DISCONTINUED | OUTPATIENT
Start: 2018-06-24 | End: 2018-06-28 | Stop reason: HOSPADM

## 2018-06-24 RX ORDER — BENZOCAINE/MENTHOL 6 MG-10 MG
LOZENGE MUCOUS MEMBRANE 2 TIMES DAILY
Status: DISCONTINUED | OUTPATIENT
Start: 2018-06-24 | End: 2018-06-28 | Stop reason: HOSPADM

## 2018-06-24 RX ORDER — ONDANSETRON 2 MG/ML
4 INJECTION INTRAMUSCULAR; INTRAVENOUS EVERY 6 HOURS PRN
Status: DISCONTINUED | OUTPATIENT
Start: 2018-06-24 | End: 2018-06-28 | Stop reason: HOSPADM

## 2018-06-24 RX ORDER — CALCIUM CARBONATE 500 MG/1
500 TABLET, CHEWABLE ORAL 3 TIMES DAILY PRN
Status: DISCONTINUED | OUTPATIENT
Start: 2018-06-24 | End: 2018-06-28 | Stop reason: HOSPADM

## 2018-06-24 RX ORDER — FUROSEMIDE 20 MG
20 TABLET ORAL EVERY EVENING
Status: DISCONTINUED | OUTPATIENT
Start: 2018-06-24 | End: 2018-06-24

## 2018-06-24 RX ORDER — TRIAMCINOLONE ACETONIDE 0.25 MG/G
OINTMENT TOPICAL DAILY
Status: DISCONTINUED | OUTPATIENT
Start: 2018-06-24 | End: 2018-06-28 | Stop reason: HOSPADM

## 2018-06-24 RX ORDER — NICOTINE POLACRILEX 4 MG
15-30 LOZENGE BUCCAL
Status: DISCONTINUED | OUTPATIENT
Start: 2018-06-24 | End: 2018-06-28 | Stop reason: HOSPADM

## 2018-06-24 RX ORDER — ALBUTEROL SULFATE 90 UG/1
2 AEROSOL, METERED RESPIRATORY (INHALATION) EVERY 6 HOURS PRN
Status: DISCONTINUED | OUTPATIENT
Start: 2018-06-24 | End: 2018-06-28 | Stop reason: HOSPADM

## 2018-06-24 RX ORDER — NALOXONE HYDROCHLORIDE 0.4 MG/ML
.1-.4 INJECTION, SOLUTION INTRAMUSCULAR; INTRAVENOUS; SUBCUTANEOUS
Status: DISCONTINUED | OUTPATIENT
Start: 2018-06-24 | End: 2018-06-28 | Stop reason: HOSPADM

## 2018-06-24 RX ORDER — ONDANSETRON 4 MG/1
4 TABLET, ORALLY DISINTEGRATING ORAL EVERY 6 HOURS PRN
Status: DISCONTINUED | OUTPATIENT
Start: 2018-06-24 | End: 2018-06-28 | Stop reason: HOSPADM

## 2018-06-24 RX ADMIN — ACETAMINOPHEN 650 MG: 325 TABLET, FILM COATED ORAL at 14:27

## 2018-06-24 RX ADMIN — ATORVASTATIN CALCIUM 10 MG: 10 TABLET, FILM COATED ORAL at 08:09

## 2018-06-24 RX ADMIN — MICONAZOLE NITRATE: 2 POWDER TOPICAL at 08:17

## 2018-06-24 RX ADMIN — CARVEDILOL 12.5 MG: 12.5 TABLET, FILM COATED ORAL at 08:09

## 2018-06-24 RX ADMIN — HYDROCORTISONE: 1 CREAM TOPICAL at 08:17

## 2018-06-24 RX ADMIN — CARVEDILOL 12.5 MG: 12.5 TABLET, FILM COATED ORAL at 18:54

## 2018-06-24 RX ADMIN — FUROSEMIDE 40 MG: 40 TABLET ORAL at 09:38

## 2018-06-24 RX ADMIN — INSULIN GLARGINE 20 UNITS: 100 INJECTION, SOLUTION SUBCUTANEOUS at 21:31

## 2018-06-24 RX ADMIN — FUROSEMIDE 20 MG: 20 TABLET ORAL at 20:49

## 2018-06-24 RX ADMIN — CALCITRIOL 0.25 MCG: 0.25 CAPSULE, LIQUID FILLED ORAL at 20:49

## 2018-06-24 RX ADMIN — TRIAMCINOLONE ACETONIDE: 0.25 OINTMENT TOPICAL at 08:17

## 2018-06-24 RX ADMIN — CALCIUM CARBONATE (ANTACID) CHEW TAB 500 MG 500 MG: 500 CHEW TAB at 00:13

## 2018-06-24 RX ADMIN — LIDOCAINE HYDROCHLORIDE 20 ML: 10 INJECTION, SOLUTION EPIDURAL; INFILTRATION; INTRACAUDAL; PERINEURAL at 09:45

## 2018-06-24 RX ADMIN — FUROSEMIDE 20 MG: 20 TABLET ORAL at 21:28

## 2018-06-24 RX ADMIN — ACETAMINOPHEN 650 MG: 325 TABLET, FILM COATED ORAL at 18:31

## 2018-06-24 RX ADMIN — SODIUM CHLORIDE: 900 IRRIGANT IRRIGATION at 11:30

## 2018-06-24 RX ADMIN — CALCIUM CARBONATE (ANTACID) CHEW TAB 500 MG 500 MG: 500 CHEW TAB at 06:14

## 2018-06-24 ASSESSMENT — ENCOUNTER SYMPTOMS
DIARRHEA: 0
PALPITATIONS: 0
RHINORRHEA: 0
SORE THROAT: 0
CHILLS: 0
FEVER: 0
COLOR CHANGE: 1
WOUND: 1
ENDOCRINE COMMENTS: HIGH BLOOD SUGARS
COUGH: 0
NAUSEA: 0
DIAPHORESIS: 0
VOMITING: 0
ABDOMINAL PAIN: 0

## 2018-06-24 ASSESSMENT — PAIN DESCRIPTION - DESCRIPTORS: DESCRIPTORS: HEADACHE

## 2018-06-24 NOTE — PHARMACY-VANCOMYCIN DOSING SERVICE
Pharmacy Vancomycin Initial Note  Date of Service 2018  Patient's  1950  68 year old, female    Indication: Skin and Soft Tissue Infection    Current estimated CrCl = Estimated Creatinine Clearance: 15.9 mL/min (based on Cr of 3.62).    Creatinine for last 3 days  2018:  1:06 PM Creatinine 3.34 mg/dL  2018: 11:47 PM Creatinine 3.62 mg/dL    Recent Vancomycin Level(s) for last 3 days  No results found for requested labs within last 72 hours.      Vancomycin IV Administrations (past 72 hours)                   vancomycin (VANCOCIN) 1,250 mg in sodium chloride 0.9 % 250 mL intermittent infusion (mg) 1,250 mg New Bag 18 6322                Nephrotoxins and other renal medications (Future)    Start     Dose/Rate Route Frequency Ordered Stop    18 0510  vancomycin place cardoso - receiving intermittent dosing      1 each Does not apply SEE ADMIN INSTRUCTIONS 18 0510            Contrast Orders - past 72 hours     None        Plan:  1.  Start vancomycin 1250 mg IV x 1 dose then, intermittently following levels  2.  Goal Trough Level: 10-15 mg/L   3.  Pharmacy will check trough levels as appropriate in 1-3 Days.    4. Serum creatinine levels will be ordered daily for the first week of therapy and at least twice weekly for subsequent weeks.    5. [place] method utilized to dose vancomycin therapy: Method 2    Sara Fountain, PharmD, BCPS
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
## 2018-06-24 NOTE — PROGRESS NOTES
Pt RUSTAM was transferred to unit 6C by [facility] from [facility].  Admission profile, education, PTA meds complete. She is admitted for worsening erythema/wound progression of right foot. Belongings include personal wheelchair, phone, books, and meds.  Meds dispo'd to security.  VSS, will continue to monitor.

## 2018-06-24 NOTE — PROCEDURES
Ortho Procedure Note    R foot was steriley prepped, Puente block administered to the 1st webspace, with good anesthesia to the ulcer site.  Foot re-prepped.  1.5 cm incision made longitudinally at the ulcer site, underlying tissues explored with scissors and clamp.  This wound did appear to probe to bone level.  Small amount (<1ml) of purulence expressed, cultured.  2 pieces of tissue extracted from the subdermal area, sent as tissue sample.  This tissue appeared collagenous, was more firm than normal subcutaneous tissue.  All communicating tissues/spaces were thoroughly irrigated with saline.  Small amount of packing placed in deepest pocket of wound.  Dressed with petroleum gauze, gauze, kerlix, ACE.    Patient tolerated well, no complications.  Do not anticipate need for further inpatient I&D, wound appears completely decompressed and the surrounding tissues actually appeared to be improving on ABX alone.  Packing should be removed on Tuesday or prior to discharge, whichever is sooner.    Jed Ernst MD  PGY-4 Orthopaedic Surgery  [phone]
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
## 2018-06-24 NOTE — PLAN OF CARE
Problem: Patient Care Overview  Goal: Plan of Care/Patient Progress Review  Outcome: Improving  D: Pt Came in for worsening foot wound on right foot. Left AKA (1989)      I: Monitored vitals and assessed pt status.   Changed: Changed  Running: PIV S.L.  PRN: Tylenol X 2     A: A0x4. VSS. Afebrile. Urinating adequately. Pt is SBA, stand and pivot to bedside commode and personal wheelchair. Calls Appropriately. C/O headache and mild foot pain, gave prn tylenol. Plan to transfer to med/surg unit when available, get properly fitted for new prosthetic for left leg, and get new off loading boot to get pressure off right side. Will be getting intermittent anbx.  Daughter (Caroline) is care giver and would like to be contacted with updates. Phone number on board. Had partial MRI today, due to anxiety, was only able to lay flat for a small amount of time.       P: Continue to monitor Pt status and report changes to treatment team. Med Gold 1     Temp:  [97.7  F (36.5  C)-98.1  F (36.7  C)] 98.1  F (36.7  C)  Pulse:  [59-62] 62  Heart Rate:  [61-67] 61  Resp:  [16-18] 16  BP: (130-160)/(52-67) 145/57  SpO2:  [97 %-99 %] 98 %
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
## 2018-06-25 ENCOUNTER — DOCUMENTATION ONLY (OUTPATIENT)
Dept: ORTHOPEDICS | Facility: CLINIC | Age: 68
End: 2018-06-25

## 2018-06-25 LAB
ANION GAP SERPL CALCULATED.3IONS-SCNC: 11 MMOL/L (ref 3–14)
BUN SERPL-MCNC: 80 MG/DL (ref 7–30)
CALCIUM SERPL-MCNC: 9.2 MG/DL (ref 8.5–10.1)
CHLORIDE SERPL-SCNC: 111 MMOL/L (ref 94–109)
CO2 SERPL-SCNC: 22 MMOL/L (ref 20–32)
CREAT SERPL-MCNC: 3.84 MG/DL (ref 0.52–1.04)
ERYTHROCYTE [DISTWIDTH] IN BLOOD BY AUTOMATED COUNT: 12.8 % (ref 10–15)
GFR SERPL CREATININE-BSD FRML MDRD: 12 ML/MIN/1.7M2
GLUCOSE BLDC GLUCOMTR-MCNC: 100 MG/DL (ref 70–99)
GLUCOSE BLDC GLUCOMTR-MCNC: 108 MG/DL (ref 70–99)
GLUCOSE BLDC GLUCOMTR-MCNC: 166 MG/DL (ref 70–99)
GLUCOSE BLDC GLUCOMTR-MCNC: 78 MG/DL (ref 70–99)
GLUCOSE SERPL-MCNC: 82 MG/DL (ref 70–99)
HCT VFR BLD AUTO: 29.6 % (ref 35–47)
HGB BLD-MCNC: 9.9 G/DL (ref 11.7–15.7)
MCH RBC QN AUTO: 30.7 PG (ref 26.5–33)
MCHC RBC AUTO-ENTMCNC: 33.4 G/DL (ref 31.5–36.5)
MCV RBC AUTO: 92 FL (ref 78–100)
PLATELET # BLD AUTO: 254 10E9/L (ref 150–450)
POTASSIUM SERPL-SCNC: 3.7 MMOL/L (ref 3.4–5.3)
RBC # BLD AUTO: 3.23 10E12/L (ref 3.8–5.2)
SODIUM SERPL-SCNC: 145 MMOL/L (ref 133–144)
VANCOMYCIN SERPL-MCNC: 12.4 MG/L
WBC # BLD AUTO: 7.2 10E9/L (ref 4–11)

## 2018-06-25 PROCEDURE — 40000141 ZZH STATISTIC PERIPHERAL IV START W/O US GUIDANCE

## 2018-06-25 PROCEDURE — 25000132 ZZH RX MED GY IP 250 OP 250 PS 637: Mod: GY | Performed by: INTERNAL MEDICINE

## 2018-06-25 PROCEDURE — 00000146 ZZHCL STATISTIC GLUCOSE BY METER IP

## 2018-06-25 PROCEDURE — 85027 COMPLETE CBC AUTOMATED: CPT | Performed by: HOSPITALIST

## 2018-06-25 PROCEDURE — 36415 COLL VENOUS BLD VENIPUNCTURE: CPT | Performed by: HOSPITALIST

## 2018-06-25 PROCEDURE — 21400006 ZZH R&B CCU INTERMEDIATE UMMC

## 2018-06-25 PROCEDURE — L3260 AMBULATORY SURGICAL BOOT EAC: HCPCS

## 2018-06-25 PROCEDURE — 80202 ASSAY OF VANCOMYCIN: CPT | Performed by: HOSPITALIST

## 2018-06-25 PROCEDURE — 99233 SBSQ HOSP IP/OBS HIGH 50: CPT | Performed by: HOSPITALIST

## 2018-06-25 PROCEDURE — A9270 NON-COVERED ITEM OR SERVICE: HCPCS | Mod: GY | Performed by: INTERNAL MEDICINE

## 2018-06-25 PROCEDURE — G0463 HOSPITAL OUTPT CLINIC VISIT: HCPCS

## 2018-06-25 PROCEDURE — A9270 NON-COVERED ITEM OR SERVICE: HCPCS | Mod: GY | Performed by: NURSE PRACTITIONER

## 2018-06-25 PROCEDURE — 25000128 H RX IP 250 OP 636: Performed by: HOSPITALIST

## 2018-06-25 PROCEDURE — 25000128 H RX IP 250 OP 636

## 2018-06-25 PROCEDURE — 25000132 ZZH RX MED GY IP 250 OP 250 PS 637: Mod: GY | Performed by: NURSE PRACTITIONER

## 2018-06-25 PROCEDURE — 80048 BASIC METABOLIC PNL TOTAL CA: CPT | Performed by: HOSPITALIST

## 2018-06-25 PROCEDURE — 40000982 ZZH STATISTICAL HAIKU-CANTO PHOTO

## 2018-06-25 RX ORDER — FUROSEMIDE 40 MG
40 TABLET ORAL
Status: DISCONTINUED | OUTPATIENT
Start: 2018-06-25 | End: 2018-06-26

## 2018-06-25 RX ADMIN — CARVEDILOL 12.5 MG: 12.5 TABLET, FILM COATED ORAL at 08:16

## 2018-06-25 RX ADMIN — CALCITRIOL 0.25 MCG: 0.25 CAPSULE, LIQUID FILLED ORAL at 19:42

## 2018-06-25 RX ADMIN — FUROSEMIDE 40 MG: 40 TABLET ORAL at 08:16

## 2018-06-25 RX ADMIN — AMPICILLIN SODIUM AND SULBACTAM SODIUM 3 G: 2; 1 INJECTION, POWDER, FOR SOLUTION INTRAMUSCULAR; INTRAVENOUS at 16:00

## 2018-06-25 RX ADMIN — INSULIN GLARGINE 20 UNITS: 100 INJECTION, SOLUTION SUBCUTANEOUS at 21:33

## 2018-06-25 RX ADMIN — VANCOMYCIN HYDROCHLORIDE 1500 MG: 10 INJECTION, POWDER, LYOPHILIZED, FOR SOLUTION INTRAVENOUS at 12:09

## 2018-06-25 RX ADMIN — CARVEDILOL 12.5 MG: 12.5 TABLET, FILM COATED ORAL at 17:28

## 2018-06-25 RX ADMIN — FUROSEMIDE 40 MG: 40 TABLET ORAL at 15:59

## 2018-06-25 RX ADMIN — ATORVASTATIN CALCIUM 10 MG: 10 TABLET, FILM COATED ORAL at 08:15

## 2018-06-25 NOTE — PROGRESS NOTES
"Social Work: Assessment with Discharge Plan    Patient Name:  Supriya Herr  :  1950  Age:  68 year old  MRN:  [id number]  Risk/Complexity Score:  Filed Complexity Screen Score: 10  Completed assessment with:  Pt    Presenting Information   Reason for Referral:  Potential needs at discharge with abx.  Date of Intake:  2018  Referral Source:  Chart Review  Decision Maker:  Pt when able  Alternate Decision Maker:  Per NO policy, decision makers are adult daughter.  Health Care Directive:  Copy in Chart  Living Situation:  House - pt lives in the lower level/basement of a multilevel home.  Pt states her daughter and grandson live in the upper floors.  Previous Functional Status:  Assistance with Other:  ADLs as needed.  Pt states mobility has been limited due to several prosthesis breaking \"about every two years\".  Patient and family understanding of hospitalization:  Pt states that she is being medically treated for a foot wound and antibiotic needs.  Cultural/Language/Spiritual Considerations:  No needs reported.  Adjustment to Illness:  Pt adjusting appropriately.  Pt experiencing grief as her last surviving brother is terminally ill and \"dying\".  Pt states she wants to get well enough and leave the hospital to see him one more time before he dies.    Physical Health  Reason for Admission:    1. Diabetic ulcer of toe of right foot associated with type 2 diabetes mellitus, with other ulcer severity (H)    2. End stage kidney disease (H)    3. Type 2 diabetes mellitus with foot ulcer (CODE) (H)    4. Type 2 diabetes mellitus with ESRD (end-stage renal disease) (H)    5. Non-pressure chronic ulcer of other part of right foot with other specified severity      Services Needed/Recommended:  Other:  TBD pending abx plan.  Pt states she cannot do IV abx at home due to having children in the home and she does not want her grandson to see her with IVs.  Pt would prefer oral antibiotics or getting " "infusions in a clinic setting.    Mental Health/Chemical Dependency  Diagnosis:  Pt experiencing grief with the impending death of her brother.  Support/Services in Place:  Pt states \"I'm ok, its good to talk\".  Services Needed/Recommended:  Pt not interested at this time in other supportive counseling.    Support System  Significant relationship at present time:  Pt states her daughter and grandson are supportive to her.  Pt states she also has a living sister but this sister tends to \"butt in\" on her medical appointments.  Pt states her sister \"went home mad\" because she told her to \"stop telling the doctor the wrong things\".  Pt states this is normal for their relationship.  Family of origin is available for support:  Yes  Other support available:  Yes  Gaps in support system:  Pt states she wants her prosthesis to work so she can \"walk\" again.  Patient is caregiver to:  None     Provider Information   Primary Care Physician:  Noam Jackson   [phone]   Clinic:  [address] [place] [id number]      :  Pt has a , she will ask her daughter to bring that information.    Financial   Income Source:  Not currently working  Financial Concerns:  None reported.  Insurance:    Payor/Plan Subscriber Name Rel Member # Group #   MEDICARE - MEDICARE [place] [facility] [id number]       ATTN CLAIMS, [address]   MEDICAID [place] - [place]* [place]  [id number]       [address] [address], [address]       Discharge Plan   Patient and family discharge goal:  Home with oral antibiotics.  Pt does not want TCU or continued hospitalization if possible.  Provided education on discharge plan:  YES  Patient agreeable to discharge plan:  YES  A list of Medicare Certified Facilities was provided to the patient and/or family to encourage patient choice. Patient's choices for facility are:    NA  Will NH provide Skilled rehabilitation or complex medical:  YES  General information regarding " anticipated insurance coverage and possible out of pocket cost was discussed. Patient and patient's family are aware patient may incur the cost of transportation to the facility, pending insurance payment: YES  Barriers to discharge:  Medical stability    Discharge Recommendations   Anticipated Disposition:  TBD pending medical plan.  Transportation Needs:  Medical:  Wheelchair  Name of Transportation Company and Phone:  [employer] PH: [phone].    Additional comments   SW met with pt to introduce self and role in consult.  SW to follow if pt is needing and or accepting of TCU placement.  Discharge pending medical plan/course of care.    BOYD Medrano, APSW  [address]   Phone: [phone]  Pager: [phone]  Unit: [phone]

## 2018-06-25 NOTE — PROGRESS NOTES
S: Patient was seen at the [facility], room 6410, for the fitting and delivery of a DH2 unloading shoe on orders from Dr. Christina MD for the treatment of Dx: Plantar ulcer on her right foot. Patient and I also discussed the process of acquiring a new AK prosthesis for her left side as her current prosthesis does not fit and, according to the patient, is more than five years old.     O: Patient presented supine in her hospital bed at the time of my arrival and was accompanied by her sister, Caroline. Patient had dressings on the plantar aspect of her right foot and indicated that her ulceration was around the 2nd metatarsal head. Patient's left residual limb was in healthy shape with the exception a callous on the very distal end of her limb. Patient reports that she uses her residual limb as a pivot point when she is transferring to her wheelchair and I advised the patient to not continue with that practice.     G: The goal of the patient's DH2 shoe is to offload her current site of ulceration on her right foot to enable faster healing of that area.     A: I fit the patient's right foot with a size small DH2 shoe. I removed pegs from the plantar cushion in the area associated with her current ulceration in order to properly offload the patient ulceration. Patient was happy with the fit of her shoe. Patient and I discussed care and use of her new shoe and I left the patient with written instructions regarding the care and use of her shoe provided by the . Patient and I also discussed aspects of her upcoming prosthetic care during our visit today.    P: Patient was asked to call our office if there are any questions regarding her DH2 shoe in the future. Patient was also asked to call our office to set up a casting appointment for her new AK prosthesis as soon as she has been discharged from the hospital in order to begin the process for a new prosthetic leg on her left side.

## 2018-06-25 NOTE — PLAN OF CARE
Problem: Patient Care Overview  Goal: Plan of Care/Patient Progress Review  Outcome: No Change    D: Pt admitted for worsening redness on R foot ulcer. Hx of DM II, HFpEF, CKD, traumatic left AKA (1989), recent hospitalization for right foot cellulitis and diabetic foot ulcer    I/A: Vital signs stable, afebrile, A&Ox4, sinus rhythm. Denied pain. Extra 20 mg PO lasix given to equal pt's home dose of 40 mg (orders changed for AM). Up with assist of 1 to commode. Voiding well. Dressing over R foot wound CDI.     P: Transfer to medicine floor when bed available. IV antibiotics ordered for 6/25. Continue to monitor and notify MD with pertinent changes.

## 2018-06-25 NOTE — PROGRESS NOTES
[facility] Nurse Inpatient Wound Assessment     Initial  Assessment  Reason for consultation: Evaluate and treat left buttock and intergluteal cleft wound    Assessment  Left buttock wound due to friction during wheelchair transfers  Status: initial assessment     cleft wound due to Moisture Associated Skin Damage  Status: initial assessment    Treatment Plan  Left buttock and intergluteal cleft wound: wash every other day with wound cleanser and gauze. Paint maciel-wound skin with [place] No Sting film barrier.  Remind pt not to drag the buttocks during transfers, encourage to lift instead.  Orders Written  WO Nurse follow-up plan:weekly  Nursing to notify the Provider(s) and re-consult the WO Nurse if wound(s) deteriorates or new skin concern.    Patient History  According to provider note(s): 68 year old female w DM, HTN, HFpEF, CKD stage 5 not on renal replacement, remote hx of traumatic left AKA ()  recent hospitalization a month ago (-18) for right foot cellulitis, now with R infected plantar ulcer.    Objective Data  Containment of urine/stool: Continent of bowel and Continent of bladder    Active Diet Order    Active Diet Order      Moderate Consistent CHO Diet    Output:   I/O last 3 completed shifts:  In: -   Out: 1275 [Urine:1275]    Risk Assessment:    Ben Ben Score  Av.5  Min: 16  Max: 19                            Labs:   Recent Labs  Lab 18  0516  18  2347 18  1306   HGB 9.9*  < > 10.1* 10.7*   INR  --   --  0.99  --    WBC 7.2  < > 6.7  --    A1C  --   --   --  6.0*   CRP  --   --  5.3  --    < > = values in this interval not displayed.        Recent Labs  Lab 18  1100 18  2348 18  234   CULT Culture in progress  Culture in progress  Incorrectly ordered by PCU/Clinic  Canceled, Test credited  See Accesion number [id number] No growth after 1 day No growth after 1 day       Physical Exam  Skin assessment:   Focused skin inspection:  Coccyx/sacrum, intergluteal cleft, and bilateral buttocks     Wound Location:  Intergluteal cleft and L) buttocks    Wound History: moisture and friction r/t wheel chair transfers      6/25/18  Measurements (length x width x depth, in cm) Intergluteal cleft-6x0.1x<0.1cm with exposed dermis tissue  L) buttock- evidence of friction, no actual open area, thick hyperkeratotic skin  Tunneling: none  Undermining: none  Palpation of the wound bed: normal and boggy   Periwound skin: dry/scaly  Color: pale pink  Temperature: normal   Drainage:, none  Description of drainage:   Odor: none  Pain: minimal with assessment        Interventions  Current support surface: Standard  Atmos Air mattress    Current off-loading measures: Pillows under calves, pt able to reposition independently, foam dressing does not stick well as pt moves a lot  Visual inspection of wound(s) completed  Wound Care: done per plan of care    Supplies: ordered: none  Education provided today: repositioning, appropriate transfers    Discussed plan of care with Patient and Nurse      Scarlet Emerson RN

## 2018-06-25 NOTE — PLAN OF CARE
Problem: Patient Care Overview  Goal: Plan of Care/Patient Progress Review  Outcome: Improving  D: Pt here due to worsening cellulitis on R foot ulcer. Hx of DM II, HFpEF, CKD, traumatic left AKA (1989), recent hospitalization for right foot cellulitis and diabetic foot ulcer.     I/A: Vital signs stable, afebrile, A&Ox4, sinus rhythm. Denied pain. Extra 20 mg PO lasix given to equal pt's home dose of 40 mg (orders changed for AM). Up with assist of 1 to bathroom. Voiding well. Dressing over R foot wound CDI. BG covered with meal coverage and sliding scale novolog.     P: Remove foot dressing/packing on Tuesday 6/26 per Dr. Ernst note 6/24. Transfer to medicine floor when bed available. IV Zosyn started per orders. Continue to monitor and notify Gold 1 with pertinent changes.

## 2018-06-25 NOTE — PROGRESS NOTES
[facility], [place]    Hospitalist Progress Note    Date of Service (when I saw the patient): 06/25/2018    Assessment & Plan     68 year old female with a history of DM, HTN, HFpEF, CKD stage 5 not on renal replacement, remote hx of traumatic left AKA (1989),  recent hospitalization a month ago (5/18-5/21/18) for right foot cellulitis and associated right foot diabetic foot ulcer now presenting with redness around the same ulcer. Seen by orthopedics. She is SP bedside IND and packing of wound with a wick. MRI foot done without contrast is inconclusive for osteomyelitis. But per operative notes, they could probe to the bone. So she likely has osteomyelitis unless proved otherwise. She has hardware in right great toe from prior injury. Prior cultures were polymicrobial. Ho chronic purulence. She has been seen as out pt. Her left limb prosthesis does not fit. Strap of her off loading boot on right side had come off. We have replaced the off loading shoe on right side and orthotics is working on getting her new prosthesis on left side.      # Chronic diabetic Right foot ulcer and diabetic foot cellulitis:   # Right diabetic foot  Abscess SP IND   # Hardware in place: Screw in great toe from prior injury    - Continue vancomycin and Unasyn. FU IND cultures.   - MRI of foot to r/o osteomyelitis was inconclusive as it was non contrast study. Likely needs WBC tagged study. Foot abscess probed to bone, so its osteomyelitis, unless proved otherwise. Get ESR and CRP. ID consult.   - She see orthopedics as out pt and they saw her for IND as well. I could not get hold of them today  - get NESTOR on right side to assess vascular flow. I can not palpate the pulse. No ho claudication.   - use new off loading boot. Waiting for prosthesis on left side.   - HbA1c was 6 and her BG are well controlled in general.   - She does have some neuropathy, based on the ulcer location likely is the cause of it.     #  Chronic diastolic HF, not in acute exacerbation  # Hypertension   - Ct home carvedilol 12.5mg BID    - hol furosemide 40mg AM, 20mg PM  Due to bump in creat.       # Intertrigo: Chronic, pruritic, has been seen by Derm   - topical triamcinolone, miconazole powder      DM2  - Repeat A1c, was 5.4 in March. Now 6.  - Ct glargine 20u QHS  - SSI      # Dyslipidemia   - Ct atorvastatin 10 mg       # Anemia of chronic disease  Stable    # CKD stage 5: Makes urine. Cr slightly gone up. Getting evaluated for renal transplant, has had consult for placement of AVF. Hold lasix for now until creat comes down. Vanc level was not too high     DVT Prophylaxis: Heparin SQ  Code Status: Full Code    Disposition: 2-3 jameel Vasquez MD    Interval History     Improved redness of the right foot. Now she got new off loading boot. No fevers. Not much pain.     -Data reviewed today: I reviewed all new labs and imaging results over the last 24 hours. I personally reviewed no images or EKG's today.    Physical Exam   Temp: 97.6  F (36.4  C) Temp src: Oral BP: 152/56   Heart Rate: 58 Resp: 18 SpO2: 99 % O2 Device: None (Room air)    Vitals:    06/23/18 2233 06/25/18 0126   Weight: 86.6 kg (191 lb) 86.6 kg (191 lb)     Vital Signs with Ranges  Temp:  [97.6  F (36.4  C)-98.1  F (36.7  C)] 97.6  F (36.4  C)  Heart Rate:  [54-64] 58  Resp:  [16-18] 18  BP: (152-166)/(56-87) 152/56  SpO2:  [95 %-99 %] 99 %  I/O last 3 completed shifts:  In: 360 [P.O.:360]  Out: 1850 [Urine:1850]    Constitutional:  Awake, alert, cooperative, no apparent distress  Respiratory: Clear to auscultation bilaterally, no crackles or wheezing  Cardiovascular: Regular rate and rhythm, normal S1 and S2, and no murmur noted  GI: Normal bowel sounds, soft, non-distended, non-tender  Right foot: Redness is better. Swelling better. Wound have been dressed. SP IND on foot abscess    Medications          ampicillin-sulbactam (UNASYN) IV  3 g Intravenous Q12H      atorvastatin  10 mg Oral Daily     calcitRIOL  0.25 mcg Oral Daily     carvedilol  12.5 mg Oral BID w/meals     furosemide  40 mg Oral BID     hydrocortisone   Topical BID     insulin aspart  4 Units Subcutaneous Daily with supper     insulin aspart  4 Units Subcutaneous Daily with lunch     insulin aspart  4 Units Subcutaneous QAM AC     insulin aspart  1-3 Units Subcutaneous TID AC     insulin aspart  1-3 Units Subcutaneous At Bedtime     insulin glargine  20 Units Subcutaneous At Bedtime     miconazole   Topical BID     multivitamin, therapeutic with minerals  1 tablet Oral Daily     sodium chloride (PF)  10 mL Intravenous Once     sodium chloride (PF)  3 mL Intracatheter Q8H     triamcinolone   Topical Daily     vancomycin (VANCOCIN) IV  1,500 mg Intravenous Q48H       Data     Recent Labs  Lab 06/25/18  0516 06/24/18  0623 06/23/18  2347   WBC 7.2 6.8 6.7   HGB 9.9* 9.6* 10.1*   MCV 92 91 92    224 266   INR  --   --  0.99   * 144 147*   POTASSIUM 3.7 3.6 3.8   CHLORIDE 111* 110* 111*   CO2 22 21 26   BUN 80* 78* 72*   CR 3.84* 3.60* 3.62*   ANIONGAP 11 13 10   JODY 9.2 8.7 8.9   GLC 82 80 99   ALBUMIN  --  2.3* 2.6*   PROTTOTAL  --  6.2* 7.4   BILITOTAL  --  0.2 0.2   ALKPHOS  --  49 56   ALT  --  17 13   AST  --  16 12       No results found for this or any previous visit (from the past 24 hour(s)).

## 2018-06-25 NOTE — PHARMACY-VANCOMYCIN DOSING SERVICE
Pharmacy Vancomycin Note  Date of Service 2018  Patient's  1950   68 year old, female    Indication: Skin and Soft Tissue Infection, diabetic foot ulcer, possible osteomyelilits  Goal Trough Level: 15-20 mg/L --> intensified goal given possible osteo  Day of Therapy: 3  Current Vancomycin regimen:  Vancomycin dosed intermittently based on levels, first and only dose was 1250 mg (~ 14 mg/kg) IV x1 at 2358 on     Current estimated CrCl = Estimated Creatinine Clearance: 15 mL/min (based on Cr of 3.84).    Creatinine for last 3 days  2018:  1:06 PM Creatinine 3.34 mg/dL  2018: 11:47 PM Creatinine 3.62 mg/dL  2018:  6:23 AM Creatinine 3.60 mg/dL  2018:  5:16 AM Creatinine 3.84 mg/dL    Recent Vancomycin Levels (past 3 days)  2018:  5:16 AM Vancomycin Level 12.4 mg/L -- this was ~ 29 hours after infusion    Vancomycin IV Administrations (past 72 hours)                   vancomycin (VANCOCIN) 1,250 mg in sodium chloride 0.9 % 250 mL intermittent infusion (mg) 1,250 mg New Bag 18 2358                Nephrotoxins and other renal medications (Future)    Start     Dose/Rate Route Frequency Ordered Stop    18 1500  ampicillin-sulbactam (UNASYN) 3 g vial to attach to  mL bag      3 g  over 1 Hours Intravenous EVERY 12 HOURS 18 1407      18 1100  vancomycin (VANCOCIN) 1,500 mg in sodium chloride 0.9 % 250 mL intermittent infusion      1,500 mg  over 90 Minutes Intravenous EVERY 48 HOURS 18 0942      18 0800  furosemide (LASIX) tablet 40 mg      40 mg Oral 2 TIMES DAILY. 18 2101               Contrast Orders - past 72 hours     None          Interpretation of levels and current regimen:  Spot check level is  Subtherapeutic    Has serum creatinine changed > 50% in last 72 hours: No    Urine output:  Patient is making urine, 1125 mL out on ; 530 mL + 1 undocumented amount out so far on     Renal Function: CKD stage 5, not yet on  renal replacement therapy.    Plan:  1.  Schedule vancomycin 1500 mg (17.3 mg/kg/dose) IV q48h to acheive trough of 15-20 mg/L.  2.  Pharmacy will check trough levels as appropriate in 1-3 Days.    3. Serum creatinine levels will be ordered daily for the first week of therapy and at least twice weekly for subsequent weeks.      Felix Campbell, PharmD -- pager [phone]

## 2018-06-26 ENCOUNTER — APPOINTMENT (OUTPATIENT)
Dept: ULTRASOUND IMAGING | Facility: CLINIC | Age: 68
DRG: 638 | End: 2018-06-26
Attending: HOSPITALIST
Payer: MEDICARE

## 2018-06-26 ENCOUNTER — HOME INFUSION (PRE-WILLOW HOME INFUSION) (OUTPATIENT)
Dept: PHARMACY | Facility: CLINIC | Age: 68
End: 2018-06-26

## 2018-06-26 ENCOUNTER — APPOINTMENT (OUTPATIENT)
Dept: GENERAL RADIOLOGY | Facility: CLINIC | Age: 68
DRG: 638 | End: 2018-06-26
Attending: INTERNAL MEDICINE
Payer: MEDICARE

## 2018-06-26 LAB
CREAT SERPL-MCNC: 3.86 MG/DL (ref 0.52–1.04)
CRP SERPL-MCNC: 3.1 MG/L (ref 0–8)
GFR SERPL CREATININE-BSD FRML MDRD: 12 ML/MIN/1.7M2
GLUCOSE BLDC GLUCOMTR-MCNC: 108 MG/DL (ref 70–99)
GLUCOSE BLDC GLUCOMTR-MCNC: 151 MG/DL (ref 70–99)
GLUCOSE BLDC GLUCOMTR-MCNC: 215 MG/DL (ref 70–99)
GLUCOSE BLDC GLUCOMTR-MCNC: 80 MG/DL (ref 70–99)
GLUCOSE BLDC GLUCOMTR-MCNC: 92 MG/DL (ref 70–99)

## 2018-06-26 PROCEDURE — 99233 SBSQ HOSP IP/OBS HIGH 50: CPT | Performed by: INTERNAL MEDICINE

## 2018-06-26 PROCEDURE — 86140 C-REACTIVE PROTEIN: CPT | Performed by: HOSPITALIST

## 2018-06-26 PROCEDURE — 36569 INSJ PICC 5 YR+ W/O IMAGING: CPT

## 2018-06-26 PROCEDURE — 27210209 ZZH KIT VALVED SINGLE LUMEN

## 2018-06-26 PROCEDURE — 40000982 ZZH STATISTICAL HAIKU-CANTO PHOTO

## 2018-06-26 PROCEDURE — 25000132 ZZH RX MED GY IP 250 OP 250 PS 637: Mod: GY | Performed by: INTERNAL MEDICINE

## 2018-06-26 PROCEDURE — A9270 NON-COVERED ITEM OR SERVICE: HCPCS | Mod: GY | Performed by: INTERNAL MEDICINE

## 2018-06-26 PROCEDURE — 40000141 ZZH STATISTIC PERIPHERAL IV START W/O US GUIDANCE

## 2018-06-26 PROCEDURE — 40000802 ZZH SITE CHECK

## 2018-06-26 PROCEDURE — 12000001 ZZH R&B MED SURG/OB UMMC

## 2018-06-26 PROCEDURE — 36415 COLL VENOUS BLD VENIPUNCTURE: CPT | Performed by: HOSPITALIST

## 2018-06-26 PROCEDURE — 25000131 ZZH RX MED GY IP 250 OP 636 PS 637: Mod: GY | Performed by: INTERNAL MEDICINE

## 2018-06-26 PROCEDURE — 82565 ASSAY OF CREATININE: CPT | Performed by: HOSPITALIST

## 2018-06-26 PROCEDURE — 25000125 ZZHC RX 250: Performed by: INTERNAL MEDICINE

## 2018-06-26 PROCEDURE — 40000986 XR CHEST PORT 1 VW

## 2018-06-26 PROCEDURE — 93922 UPR/L XTREMITY ART 2 LEVELS: CPT

## 2018-06-26 PROCEDURE — 25000128 H RX IP 250 OP 636: Performed by: HOSPITALIST

## 2018-06-26 PROCEDURE — 00000146 ZZHCL STATISTIC GLUCOSE BY METER IP

## 2018-06-26 RX ORDER — ANALGESIC BALM 1.74; 4.06 G/29G; G/29G
OINTMENT TOPICAL EVERY 6 HOURS PRN
Status: DISCONTINUED | OUTPATIENT
Start: 2018-06-26 | End: 2018-06-28 | Stop reason: HOSPADM

## 2018-06-26 RX ORDER — HEPARIN SODIUM,PORCINE 10 UNIT/ML
2-5 VIAL (ML) INTRAVENOUS
Status: DISCONTINUED | OUTPATIENT
Start: 2018-06-26 | End: 2018-06-28 | Stop reason: HOSPADM

## 2018-06-26 RX ORDER — LIDOCAINE 40 MG/G
CREAM TOPICAL
Status: DISCONTINUED | OUTPATIENT
Start: 2018-06-26 | End: 2018-06-28 | Stop reason: HOSPADM

## 2018-06-26 RX ORDER — FUROSEMIDE 40 MG
40 TABLET ORAL
Status: DISCONTINUED | OUTPATIENT
Start: 2018-06-26 | End: 2018-06-28 | Stop reason: HOSPADM

## 2018-06-26 RX ADMIN — INSULIN GLARGINE 20 UNITS: 100 INJECTION, SOLUTION SUBCUTANEOUS at 22:13

## 2018-06-26 RX ADMIN — CALCIUM CARBONATE (ANTACID) CHEW TAB 500 MG 500 MG: 500 CHEW TAB at 08:58

## 2018-06-26 RX ADMIN — FUROSEMIDE 40 MG: 40 TABLET ORAL at 17:51

## 2018-06-26 RX ADMIN — ATORVASTATIN CALCIUM 10 MG: 10 TABLET, FILM COATED ORAL at 08:34

## 2018-06-26 RX ADMIN — MICONAZOLE NITRATE: 2 POWDER TOPICAL at 08:39

## 2018-06-26 RX ADMIN — CARVEDILOL 12.5 MG: 12.5 TABLET, FILM COATED ORAL at 08:34

## 2018-06-26 RX ADMIN — CALCITRIOL 0.25 MCG: 0.25 CAPSULE, LIQUID FILLED ORAL at 21:01

## 2018-06-26 RX ADMIN — AMPICILLIN SODIUM AND SULBACTAM SODIUM 3 G: 2; 1 INJECTION, POWDER, FOR SOLUTION INTRAMUSCULAR; INTRAVENOUS at 17:50

## 2018-06-26 RX ADMIN — ACETAMINOPHEN 650 MG: 325 TABLET, FILM COATED ORAL at 08:58

## 2018-06-26 RX ADMIN — INSULIN ASPART 1 UNITS: 100 INJECTION, SOLUTION INTRAVENOUS; SUBCUTANEOUS at 11:40

## 2018-06-26 RX ADMIN — AMPICILLIN SODIUM AND SULBACTAM SODIUM 3 G: 2; 1 INJECTION, POWDER, FOR SOLUTION INTRAMUSCULAR; INTRAVENOUS at 03:54

## 2018-06-26 RX ADMIN — CARVEDILOL 12.5 MG: 12.5 TABLET, FILM COATED ORAL at 17:51

## 2018-06-26 RX ADMIN — LIDOCAINE HYDROCHLORIDE 2 ML: 10 INJECTION, SOLUTION EPIDURAL; INFILTRATION; INTRACAUDAL; PERINEURAL at 17:24

## 2018-06-26 NOTE — PROGRESS NOTES
Therapy: IV Antibiotics  [place]   Ded: $0  Met: $0    Co-Insurance: 100% (Pt has Medical Assistance. She will be covered at 100%)  Max Out of Pocket: $0  Met: $0    In reference to referral made 6/26/18.    Please contact Intake with any questions, [phone] or In Basket pool, [facility] [phone]).

## 2018-06-26 NOTE — PLAN OF CARE
Transfer  Transferred to: 5A  Via: wheelchair  Reason for transfer: Pt inappropriate for 6C- no cardiac monitoring needed, appropriate for medicine floor  Family: Aware of transfer, daughter is with pt  Belongings: Sent with pt (pt does have abx with security)  Chart: Sent with pt  Medications: Meds from bin sent with pt  Report called to: 5A    Problem: Patient Care Overview  Goal: Plan of Care/Patient Progress Review  Outcome: No Change  D: Worsening redness in R foot ulcer- concern for osteomyelitis.     I: Monitored vitals and assessed pt status.   Running: single lumen left PICC TKO- int abx  PRN: tylenol, tums    A: A0x4. VSS. Afebrile. SB-SR. C/o occasional R foot pain- controlled with tylenol. C/o heartburn this AM- tums given, symptoms resolved. Wheelchair bound- L BKA (pt needs new prosthetic). Pt uses her own wheelchair- and transfers to chair/toilet independently. Sheer injury noted on coccyx- per WOC nurse to use cavilon skin barrier, mepilex do not stay intact. Groin/skin folds irritated/red- treating with miconazole powder. R foot ulcer dressing changed- packing removed and gauze placed. Unable to repack due to small opening. WOC nurse will come to see pt tomorrow to further determine wound care for R foot ulcer. BG checks- SSI, lantus. Diabetic diet- eating well. Voiding adequately. BM this AM. PICC line placed this evening. Pt pleasant, cooperative.   *Per pts daughter when R foot dressing changed- she noted that the foot looked more red, and not improved at all- please pass this along to team in AM. Pts daughter concerned about pt possibly d/cing as early as tomorrow.     P: WOC nurse to see pt tomorrow to evaluate/treat R foot ulcer. PT/OT to eval. D/c with IV abx. Continue to monitor Pt status and report changes to treatment team.
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
## 2018-06-26 NOTE — PLAN OF CARE
Problem: Patient Care Overview  Goal: Plan of Care/Patient Progress Review  Outcome: No Change  Pt appeared to sleep soundly between cares overnight. Denies pain except for aching R Knee which she did not request meds for. Tele SR SB. L arm has marked IV infiltration site which is unchanged and no longer painful. R foot dressing intact. Has reddened groin and abdominal skinfolds. Coccyx red with small abrasion. PT declined to reposition q 2 hrs with turning side to side. States she cant tolerate pillows behind her back. Has L AKA. Transfers self to W/C with just SBA. Had BM overnight. Makes needs known. Will continue to monitor pt.

## 2018-06-26 NOTE — PROGRESS NOTES
[facility], [place]  Internal Medicine Progress Note - Gold services     Assessment & Plan   68 year old female with a history of DM, HTN, HFpEF, CKD stage 5 not on renal replacement, remote hx of traumatic left AKA (1989),  recent hospitalization a month ago (5/18-5/21/18) for right foot cellulitis and associated right foot diabetic foot ulcer now presenting with redness around the same ulcer.      # Chronic diabetic Right foot ulcer and diabetic foot cellulitis:   # Right diabetic foot  Abscess SP IND   # Hardware in place: Screw in great toe from prior injury  MRI foot done without contrast is inconclusive for osteomyelitis. But per operative notes, they could probe to the bone. So she likely has osteomyelitis unless proved otherwise. She has hardware in right great toe from prior injury. Prior cultures were polymicrobial with chronic purulence.  Her left limb prosthesis does not fit. Strap of her off loading boot on right side had come off. We have replaced the off loading shoe on right side and orthotics is working on getting her new prosthesis on left side.  Right NESTOR is slightly low at 0.88.  - Unasyn for total 6 weeks IV abx.  PICC place today. Will follow CRP   - [place] did not plan for further inpatient intervention  - WOCN to follow up tomorrow  - Plan Dr. Pack follow up as outpatient  - use new off loading boot. Waiting for prosthesis on left side.     # Chronic diastolic HF, not in acute exacerbation  # Hypertension   - Ct home carvedilol 12.5mg BID    - Restart furosemide 40mg  BID (was recently increased from 40mg in AM and 20mg in PM as outpatient)  - Follow Cr closely with cr likely high normal baseline for pt.     # Intertrigo  Chronic, pruritic, has been seen by Derm   - topical triamcinolone, miconazole powder     # DM2  - Repeat A1c, was 5.4 in March   - Ct glargine 20u QHS   - SSI     # Dyslipidemia   - Ct atorvastatin 10 mg      # Anemia of chronic  disease  Stable    # Mild Hypernatremia  Likely from poor oral intake.   - Recheck in AM  - Restart Lasix 40mg BID (home dose)      # CKD stage 5  Makes urine. Cr at baseline (3.4-4). Getting evaluated for renal transplant, has had consult for placement of AVF     # Pain Assessment:  Current Pain Score 6/26/2018   Patient currently in pain? denies   Pain score (0-10) -   Pain location -   Pain descriptors -   Supriya langston pain level was assessed and she currently denies pain.      Diet: Moderate Consistent CHO Diet   Active Diet Order      Moderate Consistent CHO Diet  Fluids: None  DVT Prophylaxis: Heparin SQ  Code Status: Full Code    Disposition Plan   Expected discharge:  1-2 days; Pending physical therapy assessment    Barron Trammell MD  Gold 1  Pager [phone]    --------------    Subjective    No issues over night.  Pain is controlled. Eating Ok.  Having BMs.  Updated daughter Caroline in person.    ROS  No fevers  No n/v/d  No HA  No Chest pain    Physical Exam   Vital Signs: Temp: 97.9  F (36.6  C) Temp src: Oral BP: 130/52   Heart Rate: 61 Resp: 18 SpO2: 98 % O2 Device: None (Room air)    Weight: 191 lbs 0 oz    General Appearance: AOx3, elderly female, NAD  HEENT: NCAT,   Respiratory: CTAB  Cardiovascular: RRR, S1S2, no m/g/r  GI: Soft, NTND, no HS, +BS  Lymph/Hematologic: No lymphadenopathy  Skin: Warm and dry. Patchy vitiligo.  Musculoskeletal: mild pedal edema, left BKA; right foot with deformed 2-3rd toes with dressing covered  Neurologic: loss of sensation in right foot, otherwise no focal neuro deficits      Data   Data     Recent Labs  Lab 06/26/18  0533 06/25/18  0516 06/24/18  0623 06/23/18  2347   WBC  --  7.2 6.8 6.7   HGB  --  9.9* 9.6* 10.1*   MCV  --  92 91 92   PLT  --  254 224 266   INR  --   --   --  0.99   NA  --  145* 144 147*   POTASSIUM  --  3.7 3.6 3.8   CHLORIDE  --  111* 110* 111*   CO2  --  22 21 26   BUN  --  80* 78* 72*   CR 3.86* 3.84* 3.60* 3.62*   ANIONGAP  --  11 13 10   JODY  --  9.2  8.7 8.9   GLC  --  82 80 99   ALBUMIN  --   --  2.3* 2.6*   PROTTOTAL  --   --  6.2* 7.4   BILITOTAL  --   --  0.2 0.2   ALKPHOS  --   --  49 56   ALT  --   --  17 13   AST  --   --  16 12     Recent Results (from the past 24 hour(s))   US NESTOR Doppler No Exercise    Narrative    Exam: Bilateral lower extremity resting ankle brachial indices dated  6/26/2018 10:05 AM    Comparison study: Aortic ultrasound 4/23/2018    Clinical history: Right foot chronic ulcer. Left BKA.    Ordering provider: Dr. Vasquez    Technique: Bilateral lower extremity resting ankle brachial indices  obtained.    FINDINGS:    Right:    Arm: 152 mmHg   PT at ankle: 134 mmHg   DP at foot: 122 mmHg   Toe pressure 133 mmHg   NESTOR: 0.88   TBI: 0.76      1st Digit PPG: Normal      Left:   Arm: 153 mmHg   Left BKA.        Impression    Impression:   Right leg: Resting NESTOR is 0.88, mild to moderately abnormal  (0.41-0.90). TBI is 0.76, normal.  Left leg: BKA.      Guidelines:    NESTOR Diagnostic Criteria (Based on criteria published in Circulation  2011; 124: 0362-2464):    > 1.4: Non compressible    1.00 - 1.40: Normal    0.91 - 0.99: Borderline    At or below 0.90: Abnormal    NESTOR Diagnostic Criteria (Based on ACC/AHA guideline 2008):    >/=1.3 - non compressible vessels    1.00  -1.29 - Normal    0.91 - 0.99 - Borderline    0.41 - 0.90 - Mild to moderate PAD    0.00 - 0.40 - Severe PAD

## 2018-06-26 NOTE — PROVIDER NOTIFICATION
D:Primary aware of left arm vancomycin infiltrate, demarcation of site, cold packs applied, photo taken elevated at needed  A:pt states no discomfort currently, site no longer hurts to palpation or exhibiting redness  P:per Primary

## 2018-06-27 ENCOUNTER — HOME INFUSION (PRE-WILLOW HOME INFUSION) (OUTPATIENT)
Dept: PHARMACY | Facility: CLINIC | Age: 68
End: 2018-06-27

## 2018-06-27 ENCOUNTER — APPOINTMENT (OUTPATIENT)
Dept: PHYSICAL THERAPY | Facility: CLINIC | Age: 68
DRG: 638 | End: 2018-06-27
Attending: INTERNAL MEDICINE
Payer: MEDICARE

## 2018-06-27 LAB
BACTERIA SPEC CULT: ABNORMAL
BACTERIA SPEC CULT: ABNORMAL
COPATH REPORT: NORMAL
CREAT SERPL-MCNC: 3.71 MG/DL (ref 0.52–1.04)
GFR SERPL CREATININE-BSD FRML MDRD: 12 ML/MIN/1.7M2
GLUCOSE BLDC GLUCOMTR-MCNC: 103 MG/DL (ref 70–99)
GLUCOSE BLDC GLUCOMTR-MCNC: 104 MG/DL (ref 70–99)
GLUCOSE BLDC GLUCOMTR-MCNC: 153 MG/DL (ref 70–99)
GLUCOSE BLDC GLUCOMTR-MCNC: 208 MG/DL (ref 70–99)
GLUCOSE BLDC GLUCOMTR-MCNC: 94 MG/DL (ref 70–99)
Lab: ABNORMAL
PLATELET # BLD AUTO: 282 10E9/L (ref 150–450)
SPECIMEN SOURCE: ABNORMAL

## 2018-06-27 PROCEDURE — 85049 AUTOMATED PLATELET COUNT: CPT | Performed by: HOSPITALIST

## 2018-06-27 PROCEDURE — 82565 ASSAY OF CREATININE: CPT | Performed by: HOSPITALIST

## 2018-06-27 PROCEDURE — 40000193 ZZH STATISTIC PT WARD VISIT: Performed by: REHABILITATION PRACTITIONER

## 2018-06-27 PROCEDURE — A9270 NON-COVERED ITEM OR SERVICE: HCPCS | Mod: GY | Performed by: INTERNAL MEDICINE

## 2018-06-27 PROCEDURE — 40000802 ZZH SITE CHECK

## 2018-06-27 PROCEDURE — 25000132 ZZH RX MED GY IP 250 OP 250 PS 637: Mod: GY | Performed by: INTERNAL MEDICINE

## 2018-06-27 PROCEDURE — 97530 THERAPEUTIC ACTIVITIES: CPT | Mod: GP | Performed by: REHABILITATION PRACTITIONER

## 2018-06-27 PROCEDURE — A9270 NON-COVERED ITEM OR SERVICE: HCPCS | Mod: GY | Performed by: PHYSICIAN ASSISTANT

## 2018-06-27 PROCEDURE — 99233 SBSQ HOSP IP/OBS HIGH 50: CPT | Performed by: INTERNAL MEDICINE

## 2018-06-27 PROCEDURE — 25000128 H RX IP 250 OP 636: Performed by: INTERNAL MEDICINE

## 2018-06-27 PROCEDURE — 00000146 ZZHCL STATISTIC GLUCOSE BY METER IP

## 2018-06-27 PROCEDURE — 25000132 ZZH RX MED GY IP 250 OP 250 PS 637: Mod: GY | Performed by: PHYSICIAN ASSISTANT

## 2018-06-27 PROCEDURE — 12000001 ZZH R&B MED SURG/OB UMMC

## 2018-06-27 PROCEDURE — 25000132 ZZH RX MED GY IP 250 OP 250 PS 637: Mod: GY | Performed by: HOSPITALIST

## 2018-06-27 PROCEDURE — 25000128 H RX IP 250 OP 636: Performed by: HOSPITALIST

## 2018-06-27 PROCEDURE — 36415 COLL VENOUS BLD VENIPUNCTURE: CPT | Performed by: HOSPITALIST

## 2018-06-27 PROCEDURE — 40000902 ZZH STATISTIC WOC PT EDUCATION, 16-30 MIN

## 2018-06-27 PROCEDURE — 97161 PT EVAL LOW COMPLEX 20 MIN: CPT | Mod: GP | Performed by: REHABILITATION PRACTITIONER

## 2018-06-27 RX ORDER — HEPARIN SODIUM 5000 [USP'U]/.5ML
5000 INJECTION, SOLUTION INTRAVENOUS; SUBCUTANEOUS EVERY 12 HOURS
Status: DISCONTINUED | OUTPATIENT
Start: 2018-06-27 | End: 2018-06-28

## 2018-06-27 RX ADMIN — HEPARIN SODIUM 5000 UNITS: 5000 INJECTION, SOLUTION INTRAVENOUS; SUBCUTANEOUS at 17:30

## 2018-06-27 RX ADMIN — AMPICILLIN SODIUM AND SULBACTAM SODIUM 3 G: 2; 1 INJECTION, POWDER, FOR SOLUTION INTRAMUSCULAR; INTRAVENOUS at 04:49

## 2018-06-27 RX ADMIN — ACETAMINOPHEN 650 MG: 325 TABLET, FILM COATED ORAL at 11:44

## 2018-06-27 RX ADMIN — CALCITRIOL 0.25 MCG: 0.25 CAPSULE, LIQUID FILLED ORAL at 20:23

## 2018-06-27 RX ADMIN — FUROSEMIDE 40 MG: 40 TABLET ORAL at 17:29

## 2018-06-27 RX ADMIN — ANALGESIC BALM: 1.74; 4.06 OINTMENT TOPICAL at 20:23

## 2018-06-27 RX ADMIN — CARVEDILOL 12.5 MG: 12.5 TABLET, FILM COATED ORAL at 08:06

## 2018-06-27 RX ADMIN — INSULIN GLARGINE 20 UNITS: 100 INJECTION, SOLUTION SUBCUTANEOUS at 21:38

## 2018-06-27 RX ADMIN — ATORVASTATIN CALCIUM 10 MG: 10 TABLET, FILM COATED ORAL at 08:06

## 2018-06-27 RX ADMIN — AMPICILLIN SODIUM AND SULBACTAM SODIUM 3 G: 2; 1 INJECTION, POWDER, FOR SOLUTION INTRAMUSCULAR; INTRAVENOUS at 17:30

## 2018-06-27 RX ADMIN — INSULIN ASPART 1 UNITS: 100 INJECTION, SOLUTION INTRAVENOUS; SUBCUTANEOUS at 11:41

## 2018-06-27 RX ADMIN — ACETAMINOPHEN 650 MG: 325 TABLET, FILM COATED ORAL at 17:29

## 2018-06-27 RX ADMIN — FUROSEMIDE 40 MG: 40 TABLET ORAL at 08:06

## 2018-06-27 RX ADMIN — CARVEDILOL 12.5 MG: 12.5 TABLET, FILM COATED ORAL at 17:29

## 2018-06-27 NOTE — PROGRESS NOTES
Care Coordinator Progress Note    Admission Date/Time:  6/23/2018  Attending MD:  Barron Trammell MD    Data  Chart reviewed, discussed with interdisciplinary team.   Patient was admitted for:    Diabetic ulcer of toe of right foot associated with type 2 diabetes mellitus, with other ulcer severity (H)  End stage kidney disease (H)  Type 2 diabetes mellitus with foot ulcer (CODE) (H)  Type 2 diabetes mellitus with ESRD (end-stage renal disease) (H)  Non-pressure chronic ulcer of other part of right foot with other specified severity.    Concerns with insurance coverage for discharge needs: None.  Current Living Situation: Patient lives with adult child.  Support System: Supportive and Involved  Services Involved: Home Care and PCA  Transportation at Discharge: Family or friend will provide  Transportation to Medical Appointments:   - Name of caregiver: daughter Caroline  Barriers to Discharge: medical course    Coordination of Care and Referrals: Provided patient/family with options for Home Care and Home Infusion.        Assessment    Addendum 1229: Spoke with patients daughter Caroline. She will be in today around 4pm to meet with [facility] liaison Tori to go over home infusion.     Patient admitted with infection of right diabetic foot ulcer. History of Dm, HTN, CKD, and remote history of traumatic left AKA.   Notified by MD that she will need to discharge home on twice daily IV unasyn. Met with patient at bedside to discuss discharge planning and needs. Patient lives at home with her daughter who also is her PCA. Open to [facility] for RN, PT, and OT services. AVS updated. Patient agreeable to home infusion and choice agency is [facility]; referral placed and patient will have 100% coverage. PICC line already in place. PLC ordered for both patient and daughter Caroline as she will be the one administering (voice message left for Caroline to discuss). Patient is wheelchair bound at baseline and states is able to get from bed to chair with  assist. Awaiting PT/OT recs. RNCC to continue to follow for discharge planning and needs.      Plan  Anticipated Discharge Date:  TBD  Anticipated Discharge Plan:  Home with services    Gabbi Rangel RN

## 2018-06-27 NOTE — PROGRESS NOTES
06/27/18 0921   Quick Adds   Type of Visit Initial PT Evaluation   Living Environment   Lives With child(odminick), adult   Living Arrangements house   Home Accessibility no concerns;ramps present at home;bed and bath on same level;tub/shower is not walk in   Number of Stairs to Enter Home 0   Number of Stairs Within Home 0   Stair Railings at Home none   Transportation Available family or friend will provide;other (see comments)  (Pt takes [employer] 2x/week to adult Day Program.)   Living Environment Comment Pt lives in the lower level of her home with her daughter.   Self-Care   Dominant Hand right   Usual Activity Tolerance moderate   Current Activity Tolerance fair   Regular Exercise no   Activity/Exercise Type other (see comments)  (Pt uses theraband for UE strength)   Equipment Currently Used at Home wheelchair, manual;other (see comments);shower chair  (hospital bed)   Functional Level Prior   Ambulation 1-->assistive equipment   Transferring 1-->assistive equipment   Toileting 1-->assistive equipment   Bathing 0-->independent   Dressing 0-->independent   Eating 0-->independent   Communication 0-->understands/communicates without difficulty   Swallowing 0-->swallows foods/liquids without difficulty   Cognition 1 - attention or memory deficits   Fall history within last six months yes   Number of times patient has fallen within last six months 1   Which of the above functional risks had a recent onset or change? none   Prior Functional Level Comment Pt uses a manual w/c for mobility, would like to ultimately start walking again once fitted for new prosthesis for L LE   General Information   Onset of Illness/Injury or Date of Surgery - Date 06/23/18   Referring Physician Barron Trammell MD   Pertinent History of Current Problem (include personal factors and/or comorbidities that impact the POC) Pt is 68 year old female with a history of DM, HTN, HFpEF, CKD stage 5 not on renal replacement, remote hx of  traumatic left AKA (1989)  recent hospitalization a month ago (5/18-5/21/18) for right foot cellulitis and associated right foot diabetic foot ulcer now presenting with diabetic foot ulcer and diabetic foot cellulitis.    Precautions/Limitations fall precautions   Weight-Bearing Status - RLE weight-bearing as tolerated   General Info Comments Pt does Adult Day program at [facility] 2x/week. Pt wears eyeglasses at all times.   Cognitive Status Examination   Orientation orientation to person, place and time   Level of Consciousness alert   Follows Commands and Answers Questions 100% of the time   Personal Safety and Judgment intact   Memory impaired  (pt noted has some difficulty w/ST memory)   Pain Assessment   Patient Currently in Pain No   Integumentary/Edema   Integumentary/Edema other (describe)   Integumentary/Edema Comments Pt has mild edema in ankle/calf area and typically wears compression stocking but is not currently wearing   Range of Motion (ROM)   ROM Comment BLE ROM WNL except pt cannot move toes except little toe on R foot   Strength   Strength Comments BLE strength 5/5. DF not tested d/t pain   Bed Mobility   Bed Mobility Comments Pt is MOD IND w/bed mobiltiy, using bed rail to sit up; pt has hospital bed at home w/rails and elevates HOB bc states a flat bed hurts her back   Transfer Skills   Transfer Comments Pt MOD IND bed <>chair using pivot technique   Balance   Balance Comments Balance was not formally assessed however pt sitting balance appeared normal   Sensory Examination   Sensory Perception no deficits were identified   Sensory Perception Comments Pt denies sensory loss or changes since hospital admission   General Therapy Interventions   Planned Therapy Interventions bed mobility training;balance training;gait training;prosthetic fitting/training;ROM;strengthening;stretching;transfer training   Clinical Impression   Criteria for Skilled Therapeutic Intervention yes, treatment indicated   PT  "Diagnosis activity intolerance   Influenced by the following impairments edema, pain limiting function, right foot wound   Functional limitations due to impairments transfers, w/c mobility   Clinical Presentation Evolving/Changing   Clinical Presentation Rationale Clincal judgement   Clinical Decision Making (Complexity) Low complexity   Therapy Frequency` 5 times/week   Predicted Duration of Therapy Intervention (days/wks) 1 week    Anticipated Equipment Needs at Discharge other (see comments)  (raised toilet seat)   Anticipated Discharge Disposition Home with Home Therapy   Risk & Benefits of therapy have been explained Yes   Patient, Family & other staff in agreement with plan of care Yes   Clinical Impression Comments Pt reports her current prosthesis does not fit, pt reports she is currently being re-fit for a new prosthesis. Pt reports she has not ambulated in several years, but would like to return to walking with WW once she is re-fit for properly fitting prosthetic.    [facility] [employer] TM \"6 Clicks\"   2016, Trustees of [facility], under license to [employer].  All rights reserved.   6 Clicks Short Forms Basic Mobility Inpatient Short Form   [facility] AM-PAC  \"6 Clicks\" V.2 Basic Mobility Inpatient Short Form   1. Turning from your back to your side while in a flat bed without using bedrails? 3 - A Little   2. Moving from lying on your back to sitting on the side of a flat bed without using bedrails? 3 - A Little   3. Moving to and from a bed to a chair (including a wheelchair)? 4 - None   4. Standing up from a chair using your arms (e.g., wheelchair, or bedside chair)? 3 - A Little   5. To walk in hospital room? 1 - Total   6. Climbing 3-5 steps with a railing? 1 - Total   Basic Mobility Raw Score (Score out of 24.Lower scores equate to lower levels of function) 15   Total Evaluation Time   Total Evaluation Time (Minutes) 11     "

## 2018-06-27 NOTE — PROGRESS NOTES
Received consult for direction for right foot wound care. Attempted to assess the wound as well, RN just changed the dressing with MD. Reviewed the chart I&D was performed by ortho team on 6/24/18 and wrapped the wound with adaptic, gauze, Kerlix, followed by ACE wraps. Plan is to see Dr. Pack as an outpatient. Recommend to continue with same POC daily. Paged Dr. Adkins (ortho) to place an order for nurses to change the dressing.    POC changed for L) buttock sensitive area as pt feels No sting film barrier is not effective and still sore. Changed to comfeel dressing for dressing covering. Unsure this might be effective either due to location of the wound and pt transfers using that side. See note from Monday for details.

## 2018-06-27 NOTE — PLAN OF CARE
Problem: Patient Care Overview  Goal: Plan of Care/Patient Progress Review  OT: Defer, per discussion with Pt, she is independent with ADL and functional transfers at home, has equipment at home as needed and has assistance from family.  Therapist educating Pt on the ability to use right arm with PICC line for propulsion of wheelchair and Pt able to complete wheelchair mobility around room.  Pt stating no further concerns with home accessibility or ADL completion at home, will complete orders at this time.

## 2018-06-27 NOTE — PLAN OF CARE
Problem: Patient Care Overview  Goal: Plan of Care/Patient Progress Review  Outcome: No Change  /52 (BP Location: Right arm)  Pulse 60  Temp 96.6  F (35.9  C) (Oral)  Resp 16  Wt 86.6 kg (191 lb)  SpO2 97%  Breastfeeding? No  BMI 31.78 kg/m2    Time:  2304-2753    Reason for admission:  Right foot cellulitis.  Neuro:  A&Ox4.  Clear and logical speech.  Glasses on.  Behavior:  Calm and cooperative.  Calls appropriately.  Activity:  Transfers to w/c, SBA.  Vitals:  VSS on RA, except slightly hypetensive.  HK=555/52.  Lines:  L PICC TKO w/ IV abx.    Cardiac:  CHF, HTN  Respiratory:  Sats 97% on RA.  Diminished lung sounds.  GI/:  Voiding.  No BM this shift.  Skin:  Right food wound, moist and red, some bloody drainage.  Dressing changed this shift per POC.  Coccyx wound, pink, no discharge, PROSPER.  Intertrigo rash under both breasts and groin, miconazole applied.  L AKA, prosthesis does not fit.  Endo:  ESRD w/ no dialysis, makes urine.  MT=217 and 103.  Refused Novolog @ HS, says she takes only before meals and will take w/ breakfast.  Lantus given @ HS.  Pain:  Denies pain.  Diet:  Mod CHO diet, good appetite.  Labs/Imaging:  Creat=3.86, Yy=318, Hgb=9.9, CRP=3.1.  Consults:  WOC and PT/OT.    New changes this shift:  R foot wound, dressing changed this shift.  Denies pain.  WOC to see in am.  Seen by Ortho, concern for osteomyelitis, IV Unasyn.  Eval for kidney txp.  ESRD, no dialysis.  Refused Novolog @ HS, Lantus given.      Plan:  F/U OP w/ Dr. Pack. Discharge 1-2 days pending PT assessment.    Continue to monitor and follow POC.

## 2018-06-27 NOTE — PLAN OF CARE
Problem: Infection, Risk/Actual (Adult)  Goal: Infection Prevention/Resolution  Patient will demonstrate the desired outcomes by discharge/transition of care.   Outcome: Improving  Pt is AxOx4. VSS.  /43 (BP Location: Right arm)  Pulse 60  Temp 96.3  F (35.7  C) (Oral)  Resp 16  Wt 86.6 kg (191 lb)  SpO2 97%  Breastfeeding? No  BMI 31.78 kg/m2  Pt wound on her right leg is healing well. MD saw it this AM. RN changed her dressing with no issues. WOC placed orders this afternoon. New dressing applied to her slightly pink buttocks after pt had a BM. Voiding well. Moves well to the toilet. SBA. Tylenol was given for pain x1. Patient learning center appointment is at 1315 Friday. Pt informed and her daughter was over the phone. Pt did not want her powders and creams until she showers. Pt waited on her shower until this afternoon after hearing the news of her brother passing away this morning. Blood sugars were 94 and 153.

## 2018-06-27 NOTE — PROGRESS NOTES
[facility], [place]  Internal Medicine Progress Note - Gold services     Assessment & Plan   68 year old female with a history of DM, HTN, HFpEF, CKD stage 5 not on renal replacement, remote hx of traumatic left AKA (1989),  recent hospitalization a month ago (5/18-5/21/18) for right foot cellulitis and associated right foot diabetic foot ulcer now presenting with redness around the same ulcer.      # Chronic diabetic Right foot ulcer and diabetic foot cellulitis:   # Right diabetic foot  Abscess SP IND   # Hardware in place: Screw in great toe from prior injury  MRI foot done without contrast is inconclusive for osteomyelitis. But per operative notes, they could probe to the bone. So she likely has osteomyelitis unless proved otherwise. She has hardware in right great toe from prior injury. Prior cultures were polymicrobial with chronic purulence.  Her left limb prosthesis does not fit. Strap of her off loading boot on right side had come off. We have replaced the off loading shoe on right side and orthotics is working on getting her new prosthesis on left side.  Right NESTOR is slightly low at 0.88.  - Clinically she is improving with no issues with pain or warmth at the right foot  - Unasyn for total 6 weeks IV abx.  PICC placed.   - [place] did not plan for further inpatient intervention but will have ortho to re-evaluate today  - WOCN to follow up today  - Plan Dr. Pack follow up as outpatient  - use new off loading boot. Waiting for prosthesis on left side.     # Chronic diastolic HF, not in acute exacerbation  # Hypertension   - Ct home carvedilol 12.5mg BID    - Restart furosemide 40mg  BID (was recently increased from 40mg in AM and 20mg in PM as outpatient)  - Follow Cr closely with cr likely high normal baseline for pt.     # Intertrigo  Chronic, pruritic, has been seen by Derm   - topical triamcinolone, miconazole powder     # DM2  - Repeat A1c, was 5.4 in March   - Ct glargine  20u QHS   - SSI     # Dyslipidemia   - Ct atorvastatin 10 mg      # Anemia of chronic disease  Stable    # Mild Hypernatremia  Likely from poor oral intake.   - Recheck in AM  - Continue Lasix 40mg BID (restarted 6/26 home dose)      # CKD stage 5  Makes urine. Cr at baseline (3.4-4). Getting evaluated for renal transplant, has had consult for placement of AVF     # Pain Assessment:  Current Pain Score 6/26/2018   Patient currently in pain? denies   Pain score (0-10) -   Pain location -   Pain descriptors -   Supriya langston pain level was assessed and she currently denies pain.      Diet: Moderate Consistent CHO Diet   Active Diet Order      Moderate Consistent CHO Diet  Fluids: None  DVT Prophylaxis: Heparin SQ  Code Status: Full Code    Disposition Plan   Expected discharge: Likely d/c tomorrow    Barron Trammell MD  Gold 1  Pager [phone]    --------------    Subjective    No issues over night.  Pain is controlled. Eating Ok.  Having BMs.  Last night her daughter Caroline was concerned that the right foot had increased redness.  She personally doesn't feel its worse.  No additional drainage.    ROS  No fevers  No n/v/d  No HA  No Chest pain    Physical Exam   Vital Signs: Temp: 96.6  F (35.9  C) Temp src: Oral BP: 144/61 (Pre-PT) Pulse: 60 Heart Rate: 64 Resp: 16 SpO2: 100 % O2 Device: None (Room air)    Weight: 191 lbs 0 oz    General Appearance: AOx3, elderly female, NAD  HEENT: NCAT,   Respiratory: CTAB  Cardiovascular: RRR, S1S2, no m/g/r  GI: Soft, NTND, no HS, +BS  Lymph/Hematologic: No lymphadenopathy  Skin: Warm and dry. Patchy vitiligo.  Musculoskeletal: mild pedal edema, left BKA; opening appears to have tissue developing but I did not probe it. No warmth and no tenderness. No flunctuance.  Redness with some blanching does go past the pen line.  Neurologic: loss of sensation in right foot, otherwise no focal neuro deficits                  Data   Data     Recent Labs  Lab 06/27/18  0800 06/26/18  0533 06/25/18  0516  06/24/18  0623 06/23/18  2347   WBC  --   --  7.2 6.8 6.7   HGB  --   --  9.9* 9.6* 10.1*   MCV  --   --  92 91 92   PLT  --   --  254 224 266   INR  --   --   --   --  0.99   NA  --   --  145* 144 147*   POTASSIUM  --   --  3.7 3.6 3.8   CHLORIDE  --   --  111* 110* 111*   CO2  --   --  22 21 26   BUN  --   --  80* 78* 72*   CR 3.71* 3.86* 3.84* 3.60* 3.62*   ANIONGAP  --   --  11 13 10   JODY  --   --  9.2 8.7 8.9   GLC  --   --  82 80 99   ALBUMIN  --   --   --  2.3* 2.6*   PROTTOTAL  --   --   --  6.2* 7.4   BILITOTAL  --   --   --  0.2 0.2   ALKPHOS  --   --   --  49 56   ALT  --   --   --  17 13   AST  --   --   --  16 12     Recent Results (from the past 24 hour(s))   XR Chest Port 1 View    Narrative    Study: XR CHEST PORT 1 VW 6/26/2018 5:32 PM    Comparison: CT images 4/23/2018 period radiographs 3/29/2018,  12/30/2013.    History: RN placed PICC - verify tip placement;     Findings:   Portable AP chest radiograph at 80 degrees. Left upper extremity  approach PICC with tip at the atriocaval junction. Metallic  radiodensity of the opacified, likely external to patient. No  pneumothorax. Low lung volumes. Mildly enlarged cardiac silhouette.  Prominent pulmonary vessels. Bibasilar streaky opacities, likely  atelectasis. Irregular contour of anterior left ribs, likely  represents old injury. The degenerative change of the spine. No acute  intra-abdominal abnormalities.      Impression    Impression:   1. Left upper extremity approach PICC with tip at the atriocaval  junction.  2. No acute disease.    I have personally reviewed the examination and initial interpretation  and I agree with the findings.    ZULY HOLBROOK MD

## 2018-06-27 NOTE — PLAN OF CARE
Patient's daughter concerned that the patient's foot looked worse from when it was outlined yesterday by doctor.  She requested someone to come and assess the patient's foot.  Cross cover was notified, and ordered CRP and and Procal, but decided that seeing the patient would not change any of the plans set in place by the day team.  The patient's foot has been seen by Ortho and has been debrided @ bedside.  WOC is to see tomorrow.  RN communicated this to patient's daughter who was accepting to the plan of WOC seeing tomorrow and day team following up and assessing patient tomorrow.  RN will continue to monitor and note any changes to patient's foot overnight.

## 2018-06-27 NOTE — PLAN OF CARE
Problem: Patient Care Overview  Goal: Plan of Care/Patient Progress Review  PT - 5A  Discharge Planner PT   Patient plan for discharge: Not discussed w/pt this session  Current status: Pt is IND w/wheelchair mobility and can transfer SBA w/c<>bed using a pivot transfer. Pt has hx of L transfemoral amputation and is fearful of losing other foot/LE d/t new infection. Pt is WBAT on R foot and should wear off loading boot in all transfers.  Barriers to return to prior living situation: Medical status  Recommendations for discharge: Home w/Home PT   Rationale for recommendations: Pt would benefit from continued strength training as well as gait training once she receives anticipated L LE prosthetic.  Entered by: Grecia Funk 06/27/2018 1:13 PM

## 2018-06-27 NOTE — PROGRESS NOTES
[facility] ID SERVICE: ONGOING CONSULTATION     Patient:  Supriya Herr, Date of birth 1950, Medical record number [id number]  Admission Date: 6/23/2018  Date of Visit:  June 27, 2018  Requesting Provider: Davey Vasquez          Assessment and Recommendations:   Problem List:  1. 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 S/p bedside debridement by orthopedic surgery. Recent wound cultures with MSSA and Group C Strep. Repeat cultures with MSSA, GBS, and Corynebacterium. Complicated by great toe hardware. NESTOR slightly low.   2. Remote traumatic left AKA  3. History of severe RLE celluitis in 11/10/17 and again in 5/18.   4. CKD stage V. Undergoing transplant workup.     Recommendations:  1. Continue Unasyn. Only dosed BID due to LORI so dosing frequency shouldn't be too burdensome.  End date 8/4/18.   2. Awaiting PICC placement, then ok for discharge from ID perspective  3. If patient has ongoing purulent discharge, increasing CRP, etc. will add vancomycin to cover the Corynebacterium.   4. Check CRP, CBC and BMP weekly while on therapy and have results faxed to the [facility] at [phone].   5. F/u with me in clinic on 8/2/18 at 9:00 AM. Please include appt in discharge instructions. I have asked the clinic coordinators to schedule the appt.   6. NOTE: Patient will need ongoing antibiotic suppression for pin in toe. Do not stop antibiotics without discussion with ID.     Discussion:  I would assume that Ms. Herr has underlying osteomyelitis associated with this wound and would treat as such with antibiotics for 6 weeks. I suspect that previous oral Augmentin was not able to penetrate well enough since she is still growing MSSA and streptococci. However if she fails to improve would add vancomycin to cover the Corynebacterium, which can sometimes be pathogenic. IV antibiotics will give her wound the best chance of healing without amputation.  However, she will need ongoing suppressive oral antibiotics even if it does heal to prevent relapse due to presumed infection of the hardware in her great toe.     It has been a pleasure to be involved with this patient's care. We will sign off for now, but please feel free to call with additional questions.     Ewelina Chen MD  Infectious Diseases  [phone]         Interval History:   Afebrile overnight. Anticipating discharge shortly - working with PT to determine discharge to home vs. TCU. Unfortunately, brother who was terminally ill passed away this morning. Tolerating Unasyn without difficulty. Stable kidney function.        History of Present Illness:   Supriya Herr is a 69 yo woman with DM II and advanced CKD undergoing workup for transplant and hoping to avoid dialysis prior to transplant. 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 She had an episode of severe cellulitis in 11/17 that was treated with vancomycin. She was then admitted in 5/18 with an infected foot ulcer. She was then followed by Dr. Pack in podiatry for ongoing ulcer care. Starting in Mid-June she had increased purulent drainage from the wound. She was started on Augmentin and then on clindamycin as well. However, she has had ongoing drainage despite antibiotics and Dr. Pack was concerned about adequate source control. She was admitted on 6/23/18 and underwent bedside debridement by orthopedic surgery. MRI was incomplete due to inability to remain in position by patient, but showed improvement in previous fluid collection. Since admission, she has had no leukocytosis of fever. Today, she is tearful due to fear that she will lose her remaining foot. Otherwise, she is doing well.         Allergies:      Allergies   Allergen Reactions     Nkda [No Known Drug Allergies]             Physical Exam:    Ranges forvital signs:  Temp:  [96.6  F (35.9  C)-97.3  F (36.3  C)] 96.6  F (35.9  C)  Pulse:  [60] 60  Heart Rate:  [59-71] 64  Resp:  [16-18] 16  BP: (144-170)/(41-63) 144/61  SpO2:  [96 %-100 %] 100 %  Exam:  GENERAL:  Well-developed, well-nourished, sitting in chair in no acute distress.   ENT:  Head is normocephalic, atraumatic. Oropharynx is moist without exudates or ulcers.  EYES:  Eyes have anicteric sclerae.    NECK:  Supple.  LUNGS:  Clear to auscultation.  CARDIOVASCULAR:  Regular rate and rhythm with no murmurs, gallops or rubs.  ABDOMEN:  Normal bowel sounds, soft, nontender.  EXT: Left AKA. Right foot with plantar wound dressed.  SKIN:  No acute rashes.  Line is in place without any surrounding erythema.  NEUROLOGIC:  Grossly nonfocal.         Laboratory Data:     Creatinine   Date Value Ref Range Status   06/27/2018 3.71 (H) 0.52 - 1.04 mg/dL Final   06/26/2018 3.86 (H) 0.52 - 1.04 mg/dL Final   06/25/2018 3.84 (H) 0.52 - 1.04 mg/dL Final   06/24/2018 3.60 (H) 0.52 - 1.04 mg/dL Final   06/23/2018 3.62 (H) 0.52 - 1.04 mg/dL Final     WBC   Date Value Ref Range Status   06/25/2018 7.2 4.0 - 11.0 10e9/L Final   06/24/2018 6.8 4.0 - 11.0 10e9/L Final   06/23/2018 6.7 4.0 - 11.0 10e9/L Final   05/24/2018 6.0 4.0 - 11.0 10e9/L Final   05/20/2018 4.7 4.0 - 11.0 10e9/L Final     Hemoglobin   Date Value Ref Range Status   06/25/2018 9.9 (L) 11.7 - 15.7 g/dL Final     Platelet Count   Date Value Ref Range Status   06/25/2018 254 150 - 450 10e9/L Final     Lab Results   Component Value Date     (H) 06/25/2018    BUN 80 (H) 06/25/2018    CO2 22 06/25/2018          Pertinent Recent Microbiology Data:   6/18/18 wound culture with Group C strep and MSSA    Recent Labs  Lab 06/24/18  1100 06/23/18  2348 06/23/18  2347   CULT Moderate growthStaphylococcus aureusSusceptibility testing done on previous specimen*  Moderate growthCorynebacterium striatumIdentification obtained by MALDI-TOF mass spectrometry  research use only database. Test characteristics determined and verified by the [facility] ([facility]) [place], [place].Susceptibility testing not routinely done*  Moderate growthCorynebacterium striatumIdentification obtained by MALDI-TOF mass spectrometry research use only database. Test characteristics determined and verified by the [facility] ([facility]) [place], [place].Susceptibility testing not routinely done*  Light growthStaphylococcus aureus*  Single colonyBeta hemolytic Streptococcus group B*  Light growthGram positive bacilli resembling diphtheroidsNo further identificationSusceptibility testing not routinely done*  Susceptibility testing requested byDr. Chen for routine sensitivities on the Corynebacterium striatum on 18. ME.  Incorrectly ordered by PCU/Clinic  Canceled, Test credited  See Accesion number [id number] No growth after 3 days No growth after 3 days   SDES Abscess Right Foot  Tissue Right Foot  Right Foot  Abscess Right Foot  Right Foot Blood Right Arm Blood Left Arm             Imagin18 MRI:   IMPRESSION:  Incomplete study owing to patient unable to continue rest of imaging.  In particular, osteomyelitis assessment is limited due to lack of T1  sequence.  1. Likely interim reduction of previous complex fluid collection with  possible calcification or gas at plantar soft tissue under the second  metatarsal head.  2. Trace marrow edema in the second metatarsal head, which is  nonspecific and may be reactive osteitis; however, underlying subtle  osteomyelitis cannot be excluded.

## 2018-06-28 ENCOUNTER — APPOINTMENT (OUTPATIENT)
Dept: PHYSICAL THERAPY | Facility: CLINIC | Age: 68
DRG: 638 | End: 2018-06-28
Payer: MEDICARE

## 2018-06-28 ENCOUNTER — TELEPHONE (OUTPATIENT)
Dept: WOUND CARE | Facility: CLINIC | Age: 68
End: 2018-06-28

## 2018-06-28 ENCOUNTER — HOME INFUSION (PRE-WILLOW HOME INFUSION) (OUTPATIENT)
Dept: PHARMACY | Facility: CLINIC | Age: 68
End: 2018-06-28

## 2018-06-28 VITALS
WEIGHT: 191 LBS | OXYGEN SATURATION: 97 % | RESPIRATION RATE: 16 BRPM | HEART RATE: 56 BPM | SYSTOLIC BLOOD PRESSURE: 177 MMHG | TEMPERATURE: 98.3 F | BODY MASS INDEX: 31.78 KG/M2 | DIASTOLIC BLOOD PRESSURE: 60 MMHG

## 2018-06-28 LAB
ANION GAP SERPL CALCULATED.3IONS-SCNC: 12 MMOL/L (ref 3–14)
BUN SERPL-MCNC: 77 MG/DL (ref 7–30)
CALCIUM SERPL-MCNC: 8.8 MG/DL (ref 8.5–10.1)
CHLORIDE SERPL-SCNC: 111 MMOL/L (ref 94–109)
CO2 SERPL-SCNC: 23 MMOL/L (ref 20–32)
CREAT SERPL-MCNC: 3.52 MG/DL (ref 0.52–1.04)
GFR SERPL CREATININE-BSD FRML MDRD: 13 ML/MIN/1.7M2
GLUCOSE BLDC GLUCOMTR-MCNC: 108 MG/DL (ref 70–99)
GLUCOSE BLDC GLUCOMTR-MCNC: 110 MG/DL (ref 70–99)
GLUCOSE BLDC GLUCOMTR-MCNC: 117 MG/DL (ref 70–99)
GLUCOSE SERPL-MCNC: 84 MG/DL (ref 70–99)
POTASSIUM SERPL-SCNC: 3.8 MMOL/L (ref 3.4–5.3)
SODIUM SERPL-SCNC: 146 MMOL/L (ref 133–144)

## 2018-06-28 PROCEDURE — 00000146 ZZHCL STATISTIC GLUCOSE BY METER IP

## 2018-06-28 PROCEDURE — 25000132 ZZH RX MED GY IP 250 OP 250 PS 637: Mod: GY | Performed by: HOSPITALIST

## 2018-06-28 PROCEDURE — 25000132 ZZH RX MED GY IP 250 OP 250 PS 637: Mod: GY | Performed by: PHYSICIAN ASSISTANT

## 2018-06-28 PROCEDURE — 99239 HOSP IP/OBS DSCHRG MGMT >30: CPT | Performed by: INTERNAL MEDICINE

## 2018-06-28 PROCEDURE — 25000128 H RX IP 250 OP 636: Performed by: INTERNAL MEDICINE

## 2018-06-28 PROCEDURE — 25000128 H RX IP 250 OP 636: Performed by: HOSPITALIST

## 2018-06-28 PROCEDURE — 97530 THERAPEUTIC ACTIVITIES: CPT | Mod: GP | Performed by: REHABILITATION PRACTITIONER

## 2018-06-28 PROCEDURE — A9270 NON-COVERED ITEM OR SERVICE: HCPCS | Mod: GY | Performed by: PHYSICIAN ASSISTANT

## 2018-06-28 PROCEDURE — 25000132 ZZH RX MED GY IP 250 OP 250 PS 637: Mod: GY | Performed by: INTERNAL MEDICINE

## 2018-06-28 PROCEDURE — 40000802 ZZH SITE CHECK

## 2018-06-28 PROCEDURE — 80048 BASIC METABOLIC PNL TOTAL CA: CPT | Performed by: INTERNAL MEDICINE

## 2018-06-28 PROCEDURE — 82565 ASSAY OF CREATININE: CPT | Performed by: HOSPITALIST

## 2018-06-28 PROCEDURE — 40000193 ZZH STATISTIC PT WARD VISIT: Performed by: REHABILITATION PRACTITIONER

## 2018-06-28 PROCEDURE — A9270 NON-COVERED ITEM OR SERVICE: HCPCS | Mod: GY | Performed by: INTERNAL MEDICINE

## 2018-06-28 PROCEDURE — 36415 COLL VENOUS BLD VENIPUNCTURE: CPT | Performed by: INTERNAL MEDICINE

## 2018-06-28 RX ORDER — AMPICILLIN AND SULBACTAM 2; 1 G/1; G/1
3 INJECTION, POWDER, FOR SOLUTION INTRAMUSCULAR; INTRAVENOUS EVERY 12 HOURS
Qty: 82 EACH | Refills: 0
Start: 2018-06-28 | End: 2018-07-27

## 2018-06-28 RX ORDER — ACETAMINOPHEN 325 MG/1
650 TABLET ORAL EVERY 4 HOURS PRN
Qty: 100 TABLET | Refills: 0 | Status: SHIPPED | OUTPATIENT
Start: 2018-06-28 | End: 2020-08-07

## 2018-06-28 RX ADMIN — TRIAMCINOLONE ACETONIDE: 0.25 OINTMENT TOPICAL at 08:46

## 2018-06-28 RX ADMIN — ANALGESIC BALM: 1.74; 4.06 OINTMENT TOPICAL at 08:45

## 2018-06-28 RX ADMIN — HYDROCORTISONE: 1 CREAM TOPICAL at 08:49

## 2018-06-28 RX ADMIN — FUROSEMIDE 40 MG: 40 TABLET ORAL at 08:42

## 2018-06-28 RX ADMIN — AMPICILLIN SODIUM AND SULBACTAM SODIUM 3 G: 2; 1 INJECTION, POWDER, FOR SOLUTION INTRAMUSCULAR; INTRAVENOUS at 04:59

## 2018-06-28 RX ADMIN — ATORVASTATIN CALCIUM 10 MG: 10 TABLET, FILM COATED ORAL at 08:42

## 2018-06-28 RX ADMIN — CARVEDILOL 12.5 MG: 12.5 TABLET, FILM COATED ORAL at 08:42

## 2018-06-28 RX ADMIN — HEPARIN SODIUM 5000 UNITS: 5000 INJECTION, SOLUTION INTRAVENOUS; SUBCUTANEOUS at 04:58

## 2018-06-28 RX ADMIN — AMPICILLIN SODIUM AND SULBACTAM SODIUM 3 G: 2; 1 INJECTION, POWDER, FOR SOLUTION INTRAMUSCULAR; INTRAVENOUS at 16:40

## 2018-06-28 RX ADMIN — MICONAZOLE NITRATE: 2 POWDER TOPICAL at 08:45

## 2018-06-28 RX ADMIN — FUROSEMIDE 40 MG: 40 TABLET ORAL at 16:40

## 2018-06-28 NOTE — PROGRESS NOTES
Ortho Brief Note    Patient should follow up with Dr. Pack in clinic after discharge within 1 week. Will attempt to facilitate follow up with Dr. Gautam as well as soon as possible. Will send message to scheduling to facilitate follow up with patient.     Abdiel Sams MD  PGY-4  Orthopaedic Surgery  [phone]

## 2018-06-28 NOTE — DISCHARGE SUMMARY
[facility], [place]    Internal Medicine Discharge Summary- Gold Service    Date of Admission:  6/23/2018  Date of Discharge:  6/28/2018  Discharging Provider: Barron Trammell  Discharge Team: Gold 1    Discharge Diagnoses   # Chronic diabetic Right foot ulcer and diabetic foot cellulitis:   # Right diabetic foot  Abscess s/p I and D     Follow-ups Needed After Discharge   1. Follow up with Dr Gautam within 1 week. This will facilitated by ortho.  2. Follow up with Dr. Chen of ID 8/2/18 at 9:00 AM.  3. Check CRP, CBC and BMP weekly while on therapy and have results faxed to the [facility] at [phone].     Hospital Course   68 year old female with a history of DM, HTN, HFpEF, CKD stage 5 not on renal replacement, remote hx of traumatic left AKA (1989),  recent hospitalization a month ago (5/18-5/21/18) for right foot cellulitis and associated right foot diabetic foot ulcer now presenting with redness around the same ulcer.       # Chronic diabetic Right foot ulcer and diabetic foot cellulitis:   # Right diabetic foot  Abscess SP IND   # Hardware in place: Screw in great toe from prior injury  MRI foot done without contrast is inconclusive for osteomyelitis. But per I and D notes, ortho could probe to the bone. So she likely has osteomyelitis unless proved otherwise. She has hardware in right great toe from prior injury. Prior cultures were polymicrobial with chronic purulence.  Her left limb prosthesis does not fit. Strap of her off loading boot on right side had come off. We have replaced the off loading shoe on right side and orthotics is working on getting her new prosthesis on left side.  Right NESTOR is slightly low at 0.88.  - Recent wound cultures with MSSA and Group C Strep. Repeat cultures with MSSA, GBS, and Corynebacterium.   - Clinically she is improving with no issues with pain or warmth at the right foot  - Unasyn for at least 6 weeks IV abx.  PICC placed.   - Plan  Dr. Gautam follow up as outpatient  - use new off loading boot. Waiting for prosthesis on left side.     ID note recommendations on 6/27:  Recommendations:  1. Continue Unasyn. Only dosed BID due to LORI so dosing frequency shouldn't be too burdensome.  End date 8/4/18.   2. If patient has ongoing purulent discharge, increasing CRP, etc. will add vancomycin to cover the Corynebacterium.   3. Check CRP, CBC and BMP weekly while on therapy and have results faxed to the [facility] at [phone].   4. F/u  in clinic on 8/2/18 at 9:00 AM.   5. NOTE: Patient will need ongoing antibiotic suppression for pin in toe. Do not stop antibiotics without discussion with ID.     # Chronic diastolic HF, not in acute exacerbation  # Hypertension   - Home meds were unchanged on discharge.      # CKD stage 5  Cr at baseline (3.4-4). Getting evaluated for renal transplant, has had consult for placement of AVF    # Discharge Pain Plan:   - During her hospitalization, Supriya experienced pain due to osteomyelitis.  The pain plan for discharge was discussed with Supriya and her family and the plan was created in a collaborative fashion.    - Pharmacologic adjuvants:  Acetaminophen    Consultations This Hospital Stay   ORTHOPAEDIC SURGERY ADULT/PEDS IP CONSULT  WOUND OSTOMY CONTINENCE NURSE  IP CONSULT  ORTHOSIS EXTREMITY LOWER REFERRAL IP CONSULT  INFECTIOUS DISEASE GENERAL ADULT IP CONSULT  PHYSICAL THERAPY ADULT IP CONSULT  OCCUPATIONAL THERAPY ADULT IP CONSULT  PHYSICAL THERAPY ADULT IP CONSULT    Code Status   Full Code    Time Spent on this Encounter   I, Barron Trammell, personally saw the patient today and spent greater than 30 minutes discharging this patient.       Barron Trammell MD  Internal Medicine Staff Hospitalist Service  [facility]  Pager: [phone]  ______________________________________________________________________    Physical Exam   Vital Signs: Temp: 98.3  F (36.8  C) Temp src: Oral  BP: 177/60 Pulse: 56 Heart Rate: 64 Resp: 16 SpO2: 97 % O2 Device: None (Room air)    Weight: 191 lbs 0 oz    General Appearance: AOx3, elderly female, NAD  HEENT: NCAT,   Respiratory: CTAB  Cardiovascular: RRR, S1S2, no m/g/r  GI: Soft, NTND, no HS, +BS  Lymph/Hematologic: No lymphadenopathy  Skin: Warm and dry. Patchy vitiligo.  Musculoskeletal: mild pedal edema, left BKA; opening appears to have tissue developing but I did not probe it. No warmth and no tenderness. No flunctuance.  Redness with some blanching does go past the pen line.  Neurologic: loss of sensation in right foot, otherwise no focal neuro deficits            Significant Results and Procedures   Most Recent 3 CBC's:  Recent Labs   Lab Test  06/27/18   0800  06/25/18   0516  06/24/18   0623  06/23/18   2347   WBC   --   7.2  6.8  6.7   HGB   --   9.9*  9.6*  10.1*   MCV   --   92  91  92   PLT  282  254  224  266     Most Recent 3 BMP's:  Recent Labs   Lab Test  06/28/18   0640  06/27/18   0800  06/26/18   0533  06/25/18   0516  06/24/18   0623   NA  146*   --    --   145*  144   POTASSIUM  3.8   --    --   3.7  3.6   CHLORIDE  111*   --    --   111*  110*   CO2  23   --    --   22  21   BUN  77*   --    --   80*  78*   CR  3.52*  3.71*  3.86*  3.84*  3.60*   ANIONGAP  12   --    --   11  13   JODY  8.8   --    --   9.2  8.7   GLC  84   --    --   82  80     Most Recent 6 Bacteria Isolates From Any Culture (See EPIC Reports for Culture Details):  Recent Labs   Lab Test  06/24/18   1100  06/23/18   2348  06/23/18   2347  06/18/18   0949  05/19/18   0119  05/19/18   0056   CULT  Moderate growth  Staphylococcus aureus  Susceptibility testing done on previous specimen  *  Moderate growth  Corynebacterium striatum  Identification obtained by MALDI-TOF mass spectrometry research use only database. Test   characteristics determined and verified by the [facility] Diagnostic Laboratory   ([facility]) [place], [place].  Susceptibility testing not routinely  done  *  Moderate growth  Corynebacterium striatum  Identification obtained by MALDI-TOF mass spectrometry research use only database. Test   characteristics determined and verified by the [facility] Diagnostic Laboratory   ([facility]) [place], [place].  Susceptibility testing not routinely done  *  Light growth  Staphylococcus aureus  *  Single colony  Beta hemolytic Streptococcus group B  *  Light growth  Gram positive bacilli resembling diphtheroids  No further identification  Susceptibility testing not routinely done  *  Susceptibility testing requested by  Dr. Chen for routine sensitivities on the Corynebacterium striatum on 6.26.18. ME.    Incorrectly ordered by PCU/Clinic  Canceled, Test credited  See Accesion number [id number]  No growth after 4 days  No growth after 4 days  Moderate growth  Beta hemolytic Streptococcus group C  *  Moderate growth  Staphylococcus aureus  *  Light growth  Normal skin shree    No growth  Moderate growth  Acinetobacter baumannii complex  *  Heavy growth  Beta hemolytic Streptococcus group C  *  Moderate growth  Escherichia coli  *  Moderate growth  Beta hemolytic Streptococcus group B  *   ,   Results for orders placed or performed during the hospital encounter of 06/23/18   MR Foot Right w/o Contrast    Narrative    Exam: MR FOOT RIGHT W/O CONTRAST    History: Rule out osteomyelitis.    Techniques: Multiplanar multisequence imaging through the right  forefoot was obtained without administration of intra-articular or  intravenous gadolinium contrast  using routine protocol. Patient was  not able to complete study.    Comparison: June 8, 2018.    Findings:    A marker is placed at the plantar aspect of the second metatarsal  head.     There is redemonstration of fibular sided dislocation at the second  metatarsophalangeal joint. Underlying the marker, there is hypointense  focus in the subcutaneous tissue where there was previously complex  fluid collection  presence. There may be residual small collection  measuring approximately 6 x 8 mm. There is apparent overlying skin  defect.    Underlying marrow demonstrate trace T2 hyperintensity (image 18 series  5). T1 signal not assessed.    Metallic susceptibility artifact in the first digit in the region of  first metatarsophalangeal joint again seen. There is presumed  postsurgical change of third metatarsal distally with fibular  angulation alignment of the third toe again seen. Redemonstration of  fusion mass formation in the area of tarsometatarsal joint.    Mild to moderate dorsal subcutaneous soft tissue edema of the  forefoot. Diffuse muscle edema, may be related to  microangiopathy/polyneuropathy.      Impression    IMPRESSION:  Incomplete study owing to patient unable to continue rest of imaging.  In particular, osteomyelitis assessment is limited due to lack of T1  sequence.  1. Likely interim reduction of previous complex fluid collection with  possible calcification or gas at plantar soft tissue under the second  metatarsal head.  2. Trace marrow edema in the second metatarsal head, which is  nonspecific and may be reactive osteitis; however, underlying subtle  osteomyelitis cannot be excluded.    KIMBERLEE ROMAN   US NESTOR Doppler No Exercise    Narrative    Exam: Bilateral lower extremity resting ankle brachial indices dated  6/26/2018 10:05 AM    Comparison study: Aortic ultrasound 4/23/2018    Clinical history: Right foot chronic ulcer. Left BKA.    Ordering provider: Dr. Vasquez    Technique: Bilateral lower extremity resting ankle brachial indices  obtained.    FINDINGS:    Right:      Arm: 152 mmHg   PT at ankle: 134 mmHg   DP at foot: 122 mmHg   Toe pressure 133 mmHg   NESTOR: 0.88   TBI: 0.76      1st Digit PPG: Normal    Left:     Arm: 153 mmHg   Left BKA.      Impression    Impression:     Right leg: Resting NESTOR is 0.88, mild to moderately abnormal  (0.41-0.90). TBI is 0.76, normal.    Left leg:  LETTY.    Guidelines:    NESTOR Diagnostic Criteria (Based on criteria published in Circulation  2011; 124: 4601-8529):    > 1.4: Non compressible    1.00 - 1.40: Normal    0.91 - 0.99: Borderline    At or below 0.90: Abnormal    NESTOR Diagnostic Criteria (Based on ACC/AHA guideline 2008):    >/=1.3 - non compressible vessels    1.00  -1.29 - Normal    0.91 - 0.99 - Borderline    0.41 - 0.90 - Mild to moderate PAD    0.00 - 0.40 - Severe PAD    I have personally reviewed the examination and initial interpretation  and I agree with the findings.    LUIS ALBERTO COWAN   XR Chest Port 1 View    Narrative    Study: XR CHEST PORT 1 VW 6/26/2018 5:32 PM    Comparison: CT images 4/23/2018 period radiographs 3/29/2018,  12/30/2013.    History: RN placed PICC - verify tip placement;     Findings:   Portable AP chest radiograph at 80 degrees. Left upper extremity  approach PICC with tip at the atriocaval junction. Metallic  radiodensity of the opacified, likely external to patient. No  pneumothorax. Low lung volumes. Mildly enlarged cardiac silhouette.  Prominent pulmonary vessels. Bibasilar streaky opacities, likely  atelectasis. Irregular contour of anterior left ribs, likely  represents old injury. The degenerative change of the spine. No acute  intra-abdominal abnormalities.      Impression    Impression:   1. Left upper extremity approach PICC with tip at the atriocaval  junction.  2. No acute disease.    I have personally reviewed the examination and initial interpretation  and I agree with the findings.    ZULY HOLBROOK MD     *Note: Due to a large number of results and/or encounters for the requested time period, some results have not been displayed. A complete set of results can be found in Results Review.       Primary Care Physician   Naom Jackson    Discharge Disposition   Discharged to home  Condition at discharge: Stable    Discharge Orders     Medication Therapy Management Referral     Home care nursing  referral     Home infusion referral     Home Care PT Referral for Hospital Discharge     Home Care OT Referral for Hospital Discharge     MD face to face encounter   Documentation of Face to Face and Certification for Home Health Services    I certify that patient: Supriya Herr is under my care and that I, or a nurse practitioner or physician's assistant working with me, had a face-to-face encounter that meets the physician face-to-face encounter requirements with this patient on: 6/27/2018.    This encounter with the patient was in whole, or in part, for the following medical condition, which is the primary reason for home health care: complex medical status.    I certify that, based on my findings, the following services are medically necessary home health services: Nursing, Occupational Therapy and Physical Therapy.    My clinical findings support the need for the above services because: Nurse is needed: picc line cares, assessment and treatment., Occupational Therapy Services are needed to assess and treat cognitive ability and address ADL safety due to impairment in mobility. and Physical Therapy Services are needed to assess and treat the following functional impairments: mobility.    Further, I certify that my clinical findings support that this patient is homebound (i.e. absences from home require considerable and taxing effort and are for medical reasons or [facility] services or infrequently or of short duration when for other reasons) because: Requires assistance of another person or specialized equipment to access medical services because patient: Requires supervision of another for safe transfer...    Based on the above findings. I certify that this patient is confined to the home and needs intermittent skilled nursing care, physical therapy and/or speech therapy.  The patient is under my care, and I have initiated the establishment of the plan of care.  This patient will be followed by a physician  who will periodically review the plan of care.  Physician/Provider to provide follow up care: Noam Jackson    Attending hospital physician (the Medicare certified [place] provider): Dr. Jackie Trammell  Physician Signature: See electronic signature associated with these discharge orders.  Date: 6/27/2018     Reason for your hospital stay   Presumed osteomyelitis requiring IV antibiotics.     Activity   Your activity upon discharge: activity as tolerated     Adult [facility] Follow-up and recommended labs and tests   1. Check CRP, CBC and BMP weekly while on therapy and have results faxed to the [facility] at [phone].   2. Dr. Chen of ID on 8/2/18 at 9:00 AM.   3. NOTE: Patient will need ongoing antibiotic suppression for pin in toe. Do not stop antibiotics without discussion with ID.   4.         Dr Gautam within 1 week  5.         Primary care physician in the next 1-2 weeks. Hospital follow up.    Appointments on [place] and/or [facility] (with [facility] or [facility] provider or service). Call [phone] if you haven't heard regarding these appointments within 7 days of discharge.     Full Code     Diet   Follow this diet upon discharge: Orders Placed This Encounter     Moderate Consistent CHO Diet       Discharge Medications   Current Discharge Medication List      START taking these medications    Details   acetaminophen (TYLENOL) 325 MG tablet Take 2 tablets (650 mg) by mouth every 4 hours as needed for mild pain  Qty: 100 tablet, Refills: 0    Associated Diagnoses: Diabetic ulcer of toe of right foot associated with type 2 diabetes mellitus, with other ulcer severity (H)      ampicillin-sulbactam (UNASYN) 3 (2-1) g SOLR vial Inject 3 g into the vein every 12 hours  Qty: 82 each, Refills: 0    Comments: 1. Check CRP, CBC and BMP weekly while on therapy and have results faxed to the [facility] at [phone].   2. NOTE: Patient will need ongoing antibiotic suppression for pin in toe. Do  not stop antibiotics without discussion with ID.  Associated Diagnoses: Diabetic ulcer of toe of right foot associated with type 2 diabetes mellitus, with other ulcer severity (H)         CONTINUE these medications which have NOT CHANGED    Details   atorvastatin (LIPITOR) 10 MG tablet Take 1 tablet (10 mg) by mouth daily  Qty: 30 tablet    Associated Diagnoses: Hyperlipidemia LDL goal <100      !! calcitRIOL (ROCALTROL) 0.25 MCG capsule TAKE 1 CAPSULE (0.25 MCG) BY MOUTH DAILY  Qty: 30 capsule, Refills: 3    Associated Diagnoses: Vitamin D deficiency      !! CALCITRIOL PO Take by mouth daily Unsure of dosage      carvedilol (COREG) 12.5 MG tablet Take 1 tablet (12.5 mg) by mouth 2 times daily (with meals)  Qty: 60 tablet, Refills: 11    Associated Diagnoses: Essential hypertension with goal blood pressure less than 140/90      clindamycin (CLEOCIN) 300 MG capsule Take 1 capsule (300 mg) by mouth 3 times daily  Qty: 21 capsule, Refills: 0    Associated Diagnoses: Cellulitis of toe of right foot      furosemide (LASIX) 40 MG tablet Take 1 tablet (40 mg) by mouth 2 times daily  Qty: 30 tablet, Refills: 11    Associated Diagnoses: Lymphedema of both lower extremities      insulin glargine (LANTUS) 100 UNIT/ML injection Inject 20 Units Subcutaneous At Bedtime  Qty: 3 mL, Refills: 1    Associated Diagnoses: Type 2 diabetes mellitus with diabetic neuropathy, with long-term current use of insulin (H)      NOVOLOG FLEXPEN 100 UNIT/ML soln Inject 4 units SQ with breakfast, lunch and dinner.  Qty: 15 mL, Refills: 3    Associated Diagnoses: Type 2 diabetes mellitus with hyperglycemia, with long-term current use of insulin (H)      ACE/ARB/ARNI NOT PRESCRIBED, INTENTIONAL, Please choose reason not prescribed, below    Associated Diagnoses: CKD (chronic kidney disease) stage 5, GFR less than 15 ml/min (H)      albuterol (PROAIR HFA, PROVENTIL HFA, VENTOLIN HFA) 108 (90 BASE) MCG/ACT inhaler Inhale 2 puffs into the lungs every 6  hours as needed for shortness of breath / dyspnea or wheezing  Qty: 3 Inhaler, Refills: 1    Associated Diagnoses: Cough      !! blood glucose monitoring (ONE TOUCH ULTRA) test strip Use to test blood sugars 2 times daily or as directed.  Qty: 200 strip, Refills: 3    Associated Diagnoses: Type 2 diabetes mellitus with stage 3 chronic kidney disease, without long-term current use of insulin (H)      !! blood glucose monitoring (ONETOUCH ULTRA) test strip Test your blood sugar 3-4 times per day.  Qty: 200 strip, Refills: 3    Associated Diagnoses: Type 2 diabetes mellitus with hyperglycemia, with long-term current use of insulin (H)      clindamycin (CLEOCIN T) 1 % lotion Apply topically 2 times daily  Qty: 60 mL, Refills: 3    Comments: Please dispense in roll-on form if possible.  Associated Diagnoses: Intertrigo      hydrocortisone (CORTAID) 1 % cream Apply sparingly to affected area two times daily for 14 days.  Qty: 60 g, Refills: 3    Associated Diagnoses: Intertrigo      insulin pen needle (ULTICARE MINI) 31G X 6 MM Use daily or as directed.  Qty: 100 each, Refills: prn    Associated Diagnoses: Type 2 diabetes, HbA1c goal < 7% (H)      miconazole (MICATIN; MICRO GUARD) 2 % powder Apply topically 2 times daily  Qty: 71 g, Refills: 0    Associated Diagnoses: Fungal dermatitis      MULTIVITAMIN OR 1 tablet by mouth daily      !! order for DME 1 SAD light  Qty: 1 Device, Refills: 1    Associated Diagnoses: Seasonal affective disorder (H)      !! order for DME Equipment being ordered: Right Lower extremity Solaris Ready wrap calf piece:  Size small/length tall , knee piece: Size small , Thigh piece size small/length average.  Qty: 1 Units, Refills: 0    Associated Diagnoses: Edema of lower extremity      !! order for DME 1 wheelchair  Qty: 1 Device, Refills: 0    Associated Diagnoses: Traumatic amputation of lower extremity above knee, unspecified laterality, subsequent encounter (H); CKD (chronic kidney disease)  stage 3, GFR 30-59 ml/min; Type 2 diabetes mellitus with stage 3 chronic kidney disease, without long-term current use of insulin (H)      !! order for DME Equipment being ordered: TEDS stocking   Below the knee 15-20 mg  Dispense 2  Use daily  Qty: 2 Device, Refills: 1    Associated Diagnoses: Localized edema      !! ORDER FOR DME Equipment being ordered: Compression stockings, 20-30 MMHG, knee high  Qty: 1 Device, Refills: 0    Associated Diagnoses: Edema; Hypertension goal BP (blood pressure) < 140/90      triamcinolone (KENALOG) 0.025 % ointment Apply to sites of dermatitis under the breasts, chest, buttocks - once a day.  Qty: 80 g, Refills: 1    Associated Diagnoses: Dermatitis      Urea 20 % CREA cream Apply topically daily  Qty: 85 g, Refills: 3    Associated Diagnoses: Xerosis cutis; Tyloma       !! - Potential duplicate medications found. Please discuss with provider.        Allergies   Allergies   Allergen Reactions     Nkda [No Known Drug Allergies]

## 2018-06-28 NOTE — PLAN OF CARE
Problem: Patient Care Overview  Goal: Plan of Care/Patient Progress Review  Physical Therapy Discharge Summary    Reason for therapy discharge:    Discharged to home.    Progress towards therapy goal(s). See goals on Care Plan in [facility] electronic health record for goal details.  Goals met    Therapy recommendation(s):    Pt may benefit from additional skilled PT to address transfer and gait once she receives new LLE prosthesis.

## 2018-06-28 NOTE — PLAN OF CARE
Problem: Patient Care Overview  Goal: Plan of Care/Patient Progress Review  Outcome: Improving  Alert and oriented x 4. Vital signs stable. On room air. Denies pain. Blood sugar sable. Good oral intake.Indepdent with care. Wound right foot care completed.  Plan: Continue care. Discharge this afternoon.

## 2018-06-28 NOTE — PLAN OF CARE
Problem: Patient Care Overview  Goal: Plan of Care/Patient Progress Review  Outcome: No Change  /51 (BP Location: Right arm)  Pulse 60  Temp 97  F (36.1  C) (Oral)  Resp 16  Wt 86.6 kg (191 lb)  SpO2 97%  Breastfeeding? No  BMI 31.78 kg/m2  4517-5335:  Afebrile. RR and SaO2 WNL on RA. Bradycardic with HR 60s. Systolic hypertension; scheduled carvedilol given, aromatherapy utilized & relaxation promoted. Alexandria Joseph Ilianaliz was notified when BP was 195/60; rechecked and BP came down closer to baseline at 165/51. Pt denies headache & chest pain. Pt's brother passed today and she has stated she is heartbroken. Appetite good; regular diet. AC ; 4 units of supper Novolog given. HS ; however, pt had just eaten a popsicle and declined Novolog stating she gets low BG by morning. Lantus 20 units given as ordered. R foot dressing changed per pt request and per WOC note. Dressing intact over pink areas of buttocks. Assist of 1 or SBA to BR and transferring to bed. Voids spontaneously. R single lumen PICC used to infuse scheduled Unasyn IV. Reviewed how to hook PICC to IV infusion, flushing, and cleaning of hub. Pt and daughter also had PICC line teaching from [facility] Home infusion nurse. PLC appt also scheduled for Friday at 1315. Continue to monitor and follow POC.

## 2018-06-28 NOTE — PLAN OF CARE
Problem: Patient Care Overview  Goal: Plan of Care/Patient Progress Review  Outcome: Adequate for Discharge Date Met: 06/28/18  Alert and oriented x 4. Vital signs stable. On room air. Denies pain. I/A: Reviewed discharge order with patient and daughter Caroline, answered questions. Patient received her personal items/medications that was stored with security.Discharged to home with home care.

## 2018-06-28 NOTE — PROGRESS NOTES
Home Infusion  Supriya is expected to dc tomorrow and will be going home on IV unasyn q 12 hrs.  Her dtr Caroline Gray will be her primary CG and will managing the IV abx infusions. Caroline Gray has never done home IV therapy before and will not be attending the [facility] for initial teaching.  Met with Supriya and Caroline Gray at bedside and provided them with information about [facility] services.  Explained about administration method, showed them the teaching materials and explained that a home nurse will provide  teaching needed for the administration.  Informed them about supplies and delivery of supplies, storage of medication, dosing times, plan for SNV and 24/7 availability of I staff while on IV therapy.   We plan for Supriya to receive her 5p dose of unasyn at the [facility] tomorrow and her dtr will pick her up after work.  [facility] will be providing nursing services and they will see Sup[facility]ya and Caroline Gray first thing [facility] and provide education on IV administration.  Supriya and Caroline Gray verbalized understanding of all information given.   Caroline Gray is willing and able to learn and manage home IV therapy.  She asked many relevant questions and is in agreement with the plan.  Brochure left with [facility] contact information.  [facility] will contact Caroline Gray tomorrow regarding delivery time of IV med and supplies.  Tori Patiño Home [facility]fusion Liaison  [phone] pager

## 2018-06-28 NOTE — PROGRESS NOTES
Care Coordinator - Discharge Planning    Admission Date/Time:  6/23/2018  Attending MD:  Barron Trammell MD     Data  Date of initial CC assessment:    Chart reviewed, discussed with interdisciplinary team.   Patient was admitted for:   1. Diabetic ulcer of toe of right foot associated with type 2 diabetes mellitus, with other ulcer severity (H)    2. End stage kidney disease (H)    3. Type 2 diabetes mellitus with foot ulcer (CODE) (H)    4. Type 2 diabetes mellitus with ESRD (end-stage renal disease) (H)    5. Non-pressure chronic ulcer of other part of right foot with other specified severity         Assessment   Full assessment completed in previous note    Coordination of Care and Referrals: Provided patient/family with options for Home Infusion.  Per MD, patient medically ready to discharge home today. [facility] liaison notified. She met with patient and daughter yesterday for teaching. Plan is for patient to receive 5pm dose here. Daughter will pick patient up after work this evening. [facility] will meet patient and daughter at home for additional teaching and delivery tomorrow. Bedside RN updated.      Plan  Anticipated Discharge Date:  today  Anticipated Discharge Plan:  Home with home infusion        Gabbi Rangel, RN

## 2018-06-28 NOTE — PROGRESS NOTES
[facility], [place]    Internal Medicine Discharge Summary- Gold Service    Date of Admission:  6/23/2018  Date of Discharge:  6/28/2018  Discharging Provider: Barron Trammell  Discharge Team: Gold 1    Discharge Diagnoses   # Chronic diabetic Right foot ulcer and diabetic foot cellulitis:   # Right diabetic foot  Abscess s/p I and D     Follow-ups Needed After Discharge   1. Follow up with Dr Gautam within 1 week. This will facilitated by ortho.  2. Follow up with Dr. Chen of ID 8/2/18 at 9:00 AM.  3. Check CRP, CBC and BMP weekly while on therapy and have results faxed to the [facility] at [phone].     Hospital Course   68 year old female with a history of DM, HTN, HFpEF, CKD stage 5 not on renal replacement, remote hx of traumatic left AKA (1989),  recent hospitalization a month ago (5/18-5/21/18) for right foot cellulitis and associated right foot diabetic foot ulcer now presenting with redness around the same ulcer.       # Chronic diabetic Right foot ulcer and diabetic foot cellulitis:   # Right diabetic foot  Abscess SP IND   # Hardware in place: Screw in great toe from prior injury  MRI foot done without contrast is inconclusive for osteomyelitis. But per I and D notes, ortho could probe to the bone. So she likely has osteomyelitis unless proved otherwise. She has hardware in right great toe from prior injury. Prior cultures were polymicrobial with chronic purulence.  Her left limb prosthesis does not fit. Strap of her off loading boot on right side had come off. We have replaced the off loading shoe on right side and orthotics is working on getting her new prosthesis on left side.  Right NESTOR is slightly low at 0.88.  - Recent wound cultures with MSSA and Group C Strep. Repeat cultures with MSSA, GBS, and Corynebacterium.   - Clinically she is improving with no issues with pain or warmth at the right foot  - Unasyn for at least 6 weeks IV abx.  PICC placed.   - Plan  Dr. Gautam follow up as outpatient  - use new off loading boot. Waiting for prosthesis on left side.     ID note recommendations on 6/27:  Recommendations:  1. Continue Unasyn. Only dosed BID due to LORI so dosing frequency shouldn't be too burdensome.  End date 8/4/18.   2. If patient has ongoing purulent discharge, increasing CRP, etc. will add vancomycin to cover the Corynebacterium.   3. Check CRP, CBC and BMP weekly while on therapy and have results faxed to the [facility] at [phone].   4. F/u  in clinic on 8/2/18 at 9:00 AM.   5. NOTE: Patient will need ongoing antibiotic suppression for pin in toe. Do not stop antibiotics without discussion with ID.     # Chronic diastolic HF, not in acute exacerbation  # Hypertension   - Home meds were unchanged on discharge.      # CKD stage 5  Cr at baseline (3.4-4). Getting evaluated for renal transplant, has had consult for placement of AVF    # Discharge Pain Plan:   - During her hospitalization, Supriya experienced pain due to osteomyelitis.  The pain plan for discharge was discussed with Supriya and her family and the plan was created in a collaborative fashion.    - Pharmacologic adjuvants:  Acetaminophen    Consultations This Hospital Stay   ORTHOPAEDIC SURGERY ADULT/PEDS IP CONSULT  WOUND OSTOMY CONTINENCE NURSE  IP CONSULT  ORTHOSIS EXTREMITY LOWER REFERRAL IP CONSULT  INFECTIOUS DISEASE GENERAL ADULT IP CONSULT  PHYSICAL THERAPY ADULT IP CONSULT  OCCUPATIONAL THERAPY ADULT IP CONSULT  PHYSICAL THERAPY ADULT IP CONSULT    Code Status   Full Code    Time Spent on this Encounter   I, Barron Trammell, personally saw the patient today and spent greater than 30 minutes discharging this patient.       Barron Trammell MD  Internal Medicine Staff Hospitalist Service  [facility]  Pager: [phone]  ______________________________________________________________________    Physical Exam   Vital Signs: Temp: 98.3  F (36.8  C) Temp src: Oral  BP: 177/60 Pulse: 56 Heart Rate: 64 Resp: 16 SpO2: 97 % O2 Device: None (Room air)    Weight: 191 lbs 0 oz    General Appearance: AOx3, elderly female, NAD  HEENT: NCAT,   Respiratory: CTAB  Cardiovascular: RRR, S1S2, no m/g/r  GI: Soft, NTND, no HS, +BS  Lymph/Hematologic: No lymphadenopathy  Skin: Warm and dry. Patchy vitiligo.  Musculoskeletal: mild pedal edema, left BKA; opening appears to have tissue developing but I did not probe it. No warmth and no tenderness. No flunctuance.  Redness with some blanching does go past the pen line.  Neurologic: loss of sensation in right foot, otherwise no focal neuro deficits            Significant Results and Procedures   Most Recent 3 CBC's:  Recent Labs   Lab Test  06/27/18   0800  06/25/18   0516  06/24/18   0623  06/23/18   2347   WBC   --   7.2  6.8  6.7   HGB   --   9.9*  9.6*  10.1*   MCV   --   92  91  92   PLT  282  254  224  266     Most Recent 3 BMP's:  Recent Labs   Lab Test  06/28/18   0640  06/27/18   0800  06/26/18   0533  06/25/18   0516  06/24/18   0623   NA  146*   --    --   145*  144   POTASSIUM  3.8   --    --   3.7  3.6   CHLORIDE  111*   --    --   111*  110*   CO2  23   --    --   22  21   BUN  77*   --    --   80*  78*   CR  3.52*  3.71*  3.86*  3.84*  3.60*   ANIONGAP  12   --    --   11  13   JODY  8.8   --    --   9.2  8.7   GLC  84   --    --   82  80     Most Recent 6 Bacteria Isolates From Any Culture (See EPIC Reports for Culture Details):  Recent Labs   Lab Test  06/24/18   1100  06/23/18   2348  06/23/18   2347  06/18/18   0949  05/19/18   0119  05/19/18   0056   CULT  Moderate growth  Staphylococcus aureus  Susceptibility testing done on previous specimen  *  Moderate growth  Corynebacterium striatum  Identification obtained by MALDI-TOF mass spectrometry research use only database. Test   characteristics determined and verified by the [facility]   ([facility]) [place], [place].  Susceptibility testing not routinely  done  *  Moderate growth  Corynebacterium striatum  Identification obtained by MALDI-TOF mass spectrometry research use only database. Test   characteristics determined and verified by the [facility]   ([facility]) [place], [place].  Susceptibility testing not routinely done  *  Light growth  Staphylococcus aureus  *  Single colony  Beta hemolytic Streptococcus group B  *  Light growth  Gram positive bacilli resembling diphtheroids  No further identification  Susceptibility testing not routinely done  *  Susceptibility testing requested by  Dr. Chen for routine sensitivities on the Corynebacterium striatum on 6.26.18. ME.    Incorrectly ordered by PCU/Clinic  Canceled, Test credited  See Accesion number [id number]  No growth after 4 days  No growth after 4 days  Moderate growth  Beta hemolytic Streptococcus group C  *  Moderate growth  Staphylococcus aureus  *  Light growth  Normal skin shree    No growth  Moderate growth  Acinetobacter baumannii complex  *  Heavy growth  Beta hemolytic Streptococcus group C  *  Moderate growth  Escherichia coli  *  Moderate growth  Beta hemolytic Streptococcus group B  *   ,   Results for orders placed or performed during the hospital encounter of 06/23/18   MR Foot Right w/o Contrast    Narrative    Exam: MR FOOT RIGHT W/O CONTRAST    History: Rule out osteomyelitis.    Techniques: Multiplanar multisequence imaging through the right  forefoot was obtained without administration of intra-articular or  intravenous gadolinium contrast  using routine protocol. Patient was  not able to complete study.    Comparison: June 8, 2018.    Findings:    A marker is placed at the plantar aspect of the second metatarsal  head.     There is redemonstration of fibular sided dislocation at the second  metatarsophalangeal joint. Underlying the marker, there is hypointense  focus in the subcutaneous tissue where there was previously complex  fluid collection  presence. There may be residual small collection  measuring approximately 6 x 8 mm. There is apparent overlying skin  defect.    Underlying marrow demonstrate trace T2 hyperintensity (image 18 series  5). T1 signal not assessed.    Metallic susceptibility artifact in the first digit in the region of  first metatarsophalangeal joint again seen. There is presumed  postsurgical change of third metatarsal distally with fibular  angulation alignment of the third toe again seen. Redemonstration of  fusion mass formation in the area of tarsometatarsal joint.    Mild to moderate dorsal subcutaneous soft tissue edema of the  forefoot. Diffuse muscle edema, may be related to  microangiopathy/polyneuropathy.      Impression    IMPRESSION:  Incomplete study owing to patient unable to continue rest of imaging.  In particular, osteomyelitis assessment is limited due to lack of T1  sequence.  1. Likely interim reduction of previous complex fluid collection with  possible calcification or gas at plantar soft tissue under the second  metatarsal head.  2. Trace marrow edema in the second metatarsal head, which is  nonspecific and may be reactive osteitis; however, underlying subtle  osteomyelitis cannot be excluded.    KIMBERLEE ROMAN   US NESTOR Doppler No Exercise    Narrative    Exam: Bilateral lower extremity resting ankle brachial indices dated  6/26/2018 10:05 AM    Comparison study: Aortic ultrasound 4/23/2018    Clinical history: Right foot chronic ulcer. Left BKA.    Ordering provider: Dr. Vasquez    Technique: Bilateral lower extremity resting ankle brachial indices  obtained.    FINDINGS:    Right:      Arm: 152 mmHg   PT at ankle: 134 mmHg   DP at foot: 122 mmHg   Toe pressure 133 mmHg   NESTOR: 0.88   TBI: 0.76      1st Digit PPG: Normal    Left:     Arm: 153 mmHg   Left BKA.      Impression    Impression:     Right leg: Resting NESTOR is 0.88, mild to moderately abnormal  (0.41-0.90). TBI is 0.76, normal.    Left leg:  LETTY.    Guidelines:    NESTOR Diagnostic Criteria (Based on criteria published in Circulation  2011; 124: 2764-4948):    > 1.4: Non compressible    1.00 - 1.40: Normal    0.91 - 0.99: Borderline    At or below 0.90: Abnormal    NESTOR Diagnostic Criteria (Based on ACC/AHA guideline 2008):    >/=1.3 - non compressible vessels    1.00  -1.29 - Normal    0.91 - 0.99 - Borderline    0.41 - 0.90 - Mild to moderate PAD    0.00 - 0.40 - Severe PAD    I have personally reviewed the examination and initial interpretation  and I agree with the findings.    LUIS ALBERTO COWAN   XR Chest Port 1 View    Narrative    Study: XR CHEST PORT 1 VW 6/26/2018 5:32 PM    Comparison: CT images 4/23/2018 period radiographs 3/29/2018,  12/30/2013.    History: RN placed PICC - verify tip placement;     Findings:   Portable AP chest radiograph at 80 degrees. Left upper extremity  approach PICC with tip at the atriocaval junction. Metallic  radiodensity of the opacified, likely external to patient. No  pneumothorax. Low lung volumes. Mildly enlarged cardiac silhouette.  Prominent pulmonary vessels. Bibasilar streaky opacities, likely  atelectasis. Irregular contour of anterior left ribs, likely  represents old injury. The degenerative change of the spine. No acute  intra-abdominal abnormalities.      Impression    Impression:   1. Left upper extremity approach PICC with tip at the atriocaval  junction.  2. No acute disease.    I have personally reviewed the examination and initial interpretation  and I agree with the findings.    ZULY HOLBROOK MD     *Note: Due to a large number of results and/or encounters for the requested time period, some results have not been displayed. A complete set of results can be found in Results Review.       Primary Care Physician   Noam Jackson    Discharge Disposition   Discharged to home  Condition at discharge: Stable    Discharge Orders     Medication Therapy Management Referral     Home care nursing  referral     Home infusion referral     Home Care PT Referral for Hospital Discharge     Home Care OT Referral for Hospital Discharge     MD face to face encounter   Documentation of Face to Face and Certification for Home Health Services    I certify that patient: Supriya Herr is under my care and that I, or a nurse practitioner or physician's assistant working with me, had a face-to-face encounter that meets the physician face-to-face encounter requirements with this patient on: 6/27/2018.    This encounter with the patient was in whole, or in part, for the following medical condition, which is the primary reason for home health care: complex medical status.    I certify that, based on my findings, the following services are medically necessary home health services: Nursing, Occupational Therapy and Physical Therapy.    My clinical findings support the need for the above services because: Nurse is needed: picc line cares, assessment and treatment., Occupational Therapy Services are needed to assess and treat cognitive ability and address ADL safety due to impairment in mobility. and Physical Therapy Services are needed to assess and treat the following functional impairments: mobility.    Further, I certify that my clinical findings support that this patient is homebound (i.e. absences from home require considerable and taxing effort and are for medical reasons or [employer] services or infrequently or of short duration when for other reasons) because: Requires assistance of another person or specialized equipment to access medical services because patient: Requires supervision of another for safe transfer...    Based on the above findings. I certify that this patient is confined to the home and needs intermittent skilled nursing care, physical therapy and/or speech therapy.  The patient is under my care, and I have initiated the establishment of the plan of care.  This patient will be followed by a physician  who will periodically review the plan of care.  Physician/Provider to provide follow up care: Noam Jackson    Attending hospital physician (the Medicare certified [facility] provider): Dr. Jackie Trammell  Physician Signature: See electronic signature associated with these discharge orders.  Date: 6/27/2018     Reason for your hospital stay   Presumed osteomyelitis requiring IV antibiotics.     Activity   Your activity upon discharge: activity as tolerated     [facility] Follow-up and recommended labs and tests   1. Check CRP, CBC and BMP weekly while on therapy and have results faxed to the [facility] at [phone].   2. Dr. Chen of ID on 8/2/18 at 9:00 AM.   3. NOTE: Patient will need ongoing antibiotic suppression for pin in toe. Do not stop antibiotics without discussion with ID.   4.         Dr Gautam within 1 week  5.         Primary care physician in the next 1-2 weeks. Hospital follow up.    Appointments on [place] and/or [facility] (with [facility] or [facility] provider or service). Call [phone] if you haven't heard regarding these appointments within 7 days of discharge.     Full Code     Diet   Follow this diet upon discharge: Orders Placed This Encounter     Moderate Consistent CHO Diet       Discharge Medications   Current Discharge Medication List      START taking these medications    Details   acetaminophen (TYLENOL) 325 MG tablet Take 2 tablets (650 mg) by mouth every 4 hours as needed for mild pain  Qty: 100 tablet, Refills: 0    Associated Diagnoses: Diabetic ulcer of toe of right foot associated with type 2 diabetes mellitus, with other ulcer severity (H)      ampicillin-sulbactam (UNASYN) 3 (2-1) g SOLR vial Inject 3 g into the vein every 12 hours  Qty: 82 each, Refills: 0    Comments: 1. Check CRP, CBC and BMP weekly while on therapy and have results faxed to the [facility] at [phone].   2. NOTE: Patient will need ongoing antibiotic suppression for pin in toe. Do  not stop antibiotics without discussion with ID.  Associated Diagnoses: Diabetic ulcer of toe of right foot associated with type 2 diabetes mellitus, with other ulcer severity (H)         CONTINUE these medications which have NOT CHANGED    Details   atorvastatin (LIPITOR) 10 MG tablet Take 1 tablet (10 mg) by mouth daily  Qty: 30 tablet    Associated Diagnoses: Hyperlipidemia LDL goal <100      !! calcitRIOL (ROCALTROL) 0.25 MCG capsule TAKE 1 CAPSULE (0.25 MCG) BY MOUTH DAILY  Qty: 30 capsule, Refills: 3    Associated Diagnoses: Vitamin D deficiency      !! CALCITRIOL PO Take by mouth daily Unsure of dosage      carvedilol (COREG) 12.5 MG tablet Take 1 tablet (12.5 mg) by mouth 2 times daily (with meals)  Qty: 60 tablet, Refills: 11    Associated Diagnoses: Essential hypertension with goal blood pressure less than 140/90      clindamycin (CLEOCIN) 300 MG capsule Take 1 capsule (300 mg) by mouth 3 times daily  Qty: 21 capsule, Refills: 0    Associated Diagnoses: Cellulitis of toe of right foot      furosemide (LASIX) 40 MG tablet Take 1 tablet (40 mg) by mouth 2 times daily  Qty: 30 tablet, Refills: 11    Associated Diagnoses: Lymphedema of both lower extremities      insulin glargine (LANTUS) 100 UNIT/ML injection Inject 20 Units Subcutaneous At Bedtime  Qty: 3 mL, Refills: 1    Associated Diagnoses: Type 2 diabetes mellitus with diabetic neuropathy, with long-term current use of insulin (H)      NOVOLOG FLEXPEN 100 UNIT/ML soln Inject 4 units SQ with breakfast, lunch and dinner.  Qty: 15 mL, Refills: 3    Associated Diagnoses: Type 2 diabetes mellitus with hyperglycemia, with long-term current use of insulin (H)      ACE/ARB/ARNI NOT PRESCRIBED, INTENTIONAL, Please choose reason not prescribed, below    Associated Diagnoses: CKD (chronic kidney disease) stage 5, GFR less than 15 ml/min (H)      albuterol (PROAIR HFA, PROVENTIL HFA, VENTOLIN HFA) 108 (90 BASE) MCG/ACT inhaler Inhale 2 puffs into the lungs every 6  hours as needed for shortness of breath / dyspnea or wheezing  Qty: 3 Inhaler, Refills: 1    Associated Diagnoses: Cough      !! blood glucose monitoring (ONE TOUCH ULTRA) test strip Use to test blood sugars 2 times daily or as directed.  Qty: 200 strip, Refills: 3    Associated Diagnoses: Type 2 diabetes mellitus with stage 3 chronic kidney disease, without long-term current use of insulin (H)      !! blood glucose monitoring (ONETOUCH ULTRA) test strip Test your blood sugar 3-4 times per day.  Qty: 200 strip, Refills: 3    Associated Diagnoses: Type 2 diabetes mellitus with hyperglycemia, with long-term current use of insulin (H)      clindamycin (CLEOCIN T) 1 % lotion Apply topically 2 times daily  Qty: 60 mL, Refills: 3    Comments: Please dispense in roll-on form if possible.  Associated Diagnoses: Intertrigo      hydrocortisone (CORTAID) 1 % cream Apply sparingly to affected area two times daily for 14 days.  Qty: 60 g, Refills: 3    Associated Diagnoses: Intertrigo      insulin pen needle (ULTICARE MINI) 31G X 6 MM Use daily or as directed.  Qty: 100 each, Refills: prn    Associated Diagnoses: Type 2 diabetes, HbA1c goal < 7% (H)      miconazole (MICATIN; MICRO GUARD) 2 % powder Apply topically 2 times daily  Qty: 71 g, Refills: 0    Associated Diagnoses: Fungal dermatitis      MULTIVITAMIN OR 1 tablet by mouth daily      !! order for DME 1 SAD light  Qty: 1 Device, Refills: 1    Associated Diagnoses: Seasonal affective disorder (H)      !! order for DME Equipment being ordered: Right Lower extremity Solaris Ready wrap calf piece:  Size small/length tall , knee piece: Size small , Thigh piece size small/length average.  Qty: 1 Units, Refills: 0    Associated Diagnoses: Edema of lower extremity      !! order for DME 1 wheelchair  Qty: 1 Device, Refills: 0    Associated Diagnoses: Traumatic amputation of lower extremity above knee, unspecified laterality, subsequent encounter (H); CKD (chronic kidney disease)  stage 3, GFR 30-59 ml/min; Type 2 diabetes mellitus with stage 3 chronic kidney disease, without long-term current use of insulin (H)      !! order for DME Equipment being ordered: TEDS stocking   Below the knee 15-20 mg  Dispense 2  Use daily  Qty: 2 Device, Refills: 1    Associated Diagnoses: Localized edema      !! ORDER FOR DME Equipment being ordered: Compression stockings, 20-30 MMHG, knee high  Qty: 1 Device, Refills: 0    Associated Diagnoses: Edema; Hypertension goal BP (blood pressure) < 140/90      triamcinolone (KENALOG) 0.025 % ointment Apply to sites of dermatitis under the breasts, chest, buttocks - once a day.  Qty: 80 g, Refills: 1    Associated Diagnoses: Dermatitis      Urea 20 % CREA cream Apply topically daily  Qty: 85 g, Refills: 3    Associated Diagnoses: Xerosis cutis; Tyloma       !! - Potential duplicate medications found. Please discuss with provider.        Allergies   Allergies   Allergen Reactions     Nkda [No Known Drug Allergies]

## 2018-06-28 NOTE — PLAN OF CARE
Problem: Patient Care Overview  Goal: Plan of Care/Patient Progress Review  PT - per plan established by the Physical Therapist, according to functional mobility the  discharge recommendation is home with assist at home. Pt is SBA for all transfers with othro shoe on. Pt demo W/c mob, up to 100'x 1 due to wanting to safe strength for D.c home.   On 6/ 27 pt demo W/c mob up and down ramps down on 2 nd floor to simulated home ramp. Pt stating concern over IV in L arm but able to demo well. Pt stating no concern with all mob when at home.   Discharge Planner PT   Patient plan for discharge: home   Current status: see above.   Barriers to return to prior living situation: weakness,   Recommendations for discharge: home with home PT and othro to follow on fit of L leg  Rationale for recommendations: pt is back to near baseline. Good support at home.     At time of D/C pt met 4/4 goals  Pt to have home PT to follow.   No PT equipment needs at D/c            Entered by: Kristian Bernard 06/28/2018 2:30 PM

## 2018-06-28 NOTE — PLAN OF CARE
Problem: Patient Care Overview  Goal: Plan of Care/Patient Progress Review    Patient alert and oriented x4. Vitals stable on room air. Patient transfers self to wheelchair with SBA. Patient denies pain. Left AKA. Right foot dressing intact.coccyx dressing in place . Reddened groin and abdominal skin folds. No BM overnight. Left arm marked from iv infiltration. Patient denies pain. Continue to monitor and follow plan of care.

## 2018-06-29 ENCOUNTER — HOME INFUSION (PRE-WILLOW HOME INFUSION) (OUTPATIENT)
Dept: PHARMACY | Facility: CLINIC | Age: 68
End: 2018-06-29

## 2018-06-29 ENCOUNTER — CARE COORDINATION (OUTPATIENT)
Dept: CARE COORDINATION | Facility: CLINIC | Age: 68
End: 2018-06-29

## 2018-06-29 ENCOUNTER — PATIENT OUTREACH (OUTPATIENT)
Dept: CARE COORDINATION | Facility: CLINIC | Age: 68
End: 2018-06-29

## 2018-06-29 DIAGNOSIS — L03.90 CELLULITIS: Primary | ICD-10-CM

## 2018-06-29 NOTE — LETTER
Health Care Home - Access Care Plan    About Me  Patient Name:  Supriya Herr    YOB: 1950  Age:                             68 year old   [place] MRN:            [id number] Telephone Information:     Home Phone [phone]   Mobile [phone]       Address:    [address]  [facility] [address] Email address:  No e-mail address on record      Emergency Contact(s)  Name Relationship Lgl Grd Work Phone Home Phone Mobile Phone   1. BAUTISTA RIOJAS Daughter   [phone]   2. BAUTISTA GARCIA Sister   [phone]             Health Maintenance: Routine Health maintenance Reviewed: Due/Overdue     My Access Plan  Medical Emergency 911   Questions or concerns during clinic hours Primary Clinic Line, I will call the clinic directly: [facility] [place] - [phone]   24 Hour Appointment Line [phone] or  [phone]) (toll free)   24 Hour Nurse Line [phone] (toll free)   Questions or concerns outside clinic hours 24 Hour Appointment Line, I will call the after-hours on-call line:   [facility] [phone] or [phone]) (toll-free)   Preferred Urgent Care     Preferred Hospital [facility]  [phone]   Preferred Pharmacy [facility]     Behavioral Health Crisis Line The National Suicide Prevention Lifeline at 1-654.375.1510 or 911     My Care Team Members  Patient Care Team       Relationship Specialty Notifications Start End    Noam Jackson MD PCP - General Family Practice  8/14/15     Phone: [phone] Fax: [phone]         [address] [facility] [address]    Arabella Kelley APRN CNP (Inactive) PCP - ctic   12/2/00     Phone: [phone] Fax: [phone]         XXX RESIGNED XXX [address] [facility] [address]    Jud WOMACK CM [facility]     2/4/14     Phone:  [phone]         oNam Jackson MD Referring Physician Family Practice  5/29/15     Comment:  REFERRING TO NEPHROLOGY    Phone: [phone] Fax: [phone]         [address] [facility] [address]    Kathryn Basilio MD MD Nephrology  5/29/15     Phone: [phone] Fax: [phone]         [address]  [address] [facility] [address]    Pravin Cordoav, SHANTA Nurse Coordinator Nephrology Abnormal results only, Admissions 1/24/18     Eleazar Pack DPM MD Podiatrist Primary Podiatric Medicine  6/22/18     Phone: [phone] Fax: [phone]         [address] [facility] [address]    Alvaro Gautam MD MD Orthopedics  6/22/18     Phone: [phone] Fax: [phone]         [address] [facility] [address]    [facility] [place] [facility] ([place])  6/26/18     Phone: [phone]         Valdes, Hellen R, RN Clinic Care Coordinator Primary Care -   6/29/18     Phone: [phone] Fax: [phone]        Erick Pérez Home Care Nurse   6/29/18     Comment:   home care RN case manager    Phone: [phone]                My Medical and Care Information  Problem List   Patient Active Problem List   Diagnosis     Vitiligo     Traumatic amputation of leg above knee (H)     Contact dermatitis and other eczema, due to unspecified cause     Dermatophytosis of nail     Generalized osteoarthrosis, unspecified site     Hypertension goal BP (blood pressure) < 140/90     Mild nonproliferative diabetic retinopathy (H)     Proteinuria     Stage I pressure ulcer     Hyperlipidemia LDL goal <100     Non compliance with medical treatment     Advanced directives, counseling/discussion     Incontinence of urine     Basal cell carcinoma     Senile nuclear sclerosis     JONE (obstructive sleep apnea)     CHF (congestive heart failure) (H)     Health Care Home     Type 2 diabetes, HbA1C goal < 8% (H)     Type 2 diabetes mellitus with diabetic  chronic kidney disease (H)     Moderate recurrent major depression (H)     Type 2 diabetes mellitus with diabetic neuropathy, with long-term current use of insulin (H)     Macular cyst, hole, or pseudohole of retina     Traumatic amputation of left lower extremity above knee, subsequent encounter (H)     Former tobacco use     Type 2 diabetes mellitus (H)     Dyslipidemia     Anemia in chronic kidney disease     CKD (chronic kidney disease) stage 5, GFR less than 15 ml/min (H)     Obesity (BMI 30-39.9)     Anxiety and depression     Cervical cancer screening     Diabetic foot infection (H)     Plantar ulcer of right foot with fat layer exposed (H)     Cellulitis      Current Medications and Allergies:  See printed Medication Report

## 2018-06-29 NOTE — PROGRESS NOTES
Clinic Care Coordination Contact  Care Team Conversations    Called and left msg for Erick RN case manager, with my contact information and instruction to contact me with questions, concerns and when patient is discharged from home care services.     Carleen Valdes R.N.  Clinic Care Coordinator  [facility] Primary Care [facility]  [phone]

## 2018-06-29 NOTE — PROGRESS NOTES
Patient has RN visit within 24-48 hours of Discharge so no post DC follow up call is needed                Jun 29, 2018  1:15 PM CDT   Peripherally Inserted Central Catheter (PICC) & IV Med - [address] [place] [address] with Jalyn Rodriguez, SHANTA   [facility], [place], Patient Learning Center ([facility], [facility])     [address] [facility] [address]

## 2018-06-29 NOTE — PROGRESS NOTES
Clinic Care Coordination Contact  Care Team Conversations    Patient called back. She is aware that her voice mail is not set up and requests that all calls go to her daughter, Caroline. Consent to communicate form on file. Caroline's contact information is in demographics. Spoke with Caroline per request of patient. Home care nurse is there currently doing intake visit so we agreed that I would call back later. Spoke briefly to Marisel, home care nurse. Patient doing well. [facility] will be sending additional supplies for IV antibiotic therapy. Caroline shows proficiency with being able to set up IV therapy. Home care will draw weekly CRP, CMP, CBC labs as requested by discharging physician and fax to [facility]. Verbal order approval given for skilled nursing visits 2 times per week, PT/OT eval and treat through August 4th when antibiotic therapy will be discontinued (pending ID approval). My contact information is given to Marisel for any concerns, orders, etc. Will follow up with home care team in 4 weeks. RN CC intro letter and 24 hour access information mailed to home. Will contact daughter later as above.     Carleen Valdes R.N.  Clinic Care Coordinator  [facility] Primary Care [facility]  [phone]

## 2018-06-29 NOTE — PROGRESS NOTES
This is a recent snapshot of the patient's [facility] medical record.  For current drug dose and complete information and questions, call [phone] or In [facility], [facility] ([id number])  CSN Number:  [id number]

## 2018-06-29 NOTE — LETTER
[place] CARE COORDINATION    June 29, 2018    Supriya Herr  [address]  [facility] [address]      Dear Supriya,    I am a clinic care coordinator who works with Noam Jackson MD at [facility].  I wanted to introduce myself and provide you with my contact information so that you can call me with questions or concerns about your health care. Below is a description of clinic care coordination and how I can further assist you.     The clinic care coordinator is a registered nurse and/or  who understand the health care system. The goal of clinic care coordination is to help you manage your health and improve access to the [facility] system in the most efficient manner. The registered nurse can assist you in meeting your health care goals by providing education, coordinating services, and strengthening the communication among your providers. The  can assist you with financial, behavioral, psychosocial, chemical dependency, counseling, and/or psychiatric resources.    Please feel free to contact me at [phone], with any questions or concerns. We at [facility] are focused on providing you with the highest-quality healthcare experience possible and that all starts with you.     Sincerely,     Carleen Valdes    Enclosed: I have enclosed a copy of a 24 Hour Access Plan. This has helpful phone numbers for you to call when needed. Please keep this in an easy to access place to use as needed.

## 2018-06-29 NOTE — PROGRESS NOTES
This is a recent snapshot of the patient's [facility] medical record.  For current drug dose and complete information and questions, call [phone] or In [facility], [facility] ([id number])  CSN Number:  [id number]

## 2018-06-29 NOTE — PROGRESS NOTES
Clinic Care Coordination Contact  Care Coordination Communication    Referral Source: IP Report    Clinical Data: Patient was hospitalized at [facility] from 6-23-18 to 6-28-18 with diagnosis of right foot ulcer and diabetic foot cellulitis.     Home Care Contact:              Home Care Agency: [facility]              Contact name () and phone number: Erick Pérez [phone]              Care Coordination contacted home care: Yes              Anticipated start of care date: TBD    Patient Contact: unable to contact patient-voice mail not set up. Will attempt again in 1-2 business days  Clinic Care Coordinator  [facility] Primary Care [facility]  [phone]

## 2018-06-29 NOTE — PROGRESS NOTES
Clinic Care Coordination Contact  Care Team Conversations    Reviewed with daughter. Home care intake went well. The antibiotic therapy was way easier than she thought. No current concerns. Hospital follow up appt scheduled with PCP 7-5-18. Will attempt to meet with patient in clinic if able. Daughter agrees with plan    Carleen Valdes R.N.  Clinic Care Coordinator  [facility] Primary Care [facility]  [phone]

## 2018-06-30 ENCOUNTER — HOME INFUSION (PRE-WILLOW HOME INFUSION) (OUTPATIENT)
Dept: PHARMACY | Facility: CLINIC | Age: 68
End: 2018-06-30

## 2018-06-30 LAB
BACTERIA SPEC CULT: NO GROWTH
BACTERIA SPEC CULT: NO GROWTH
Lab: NORMAL
Lab: NORMAL
SPECIMEN SOURCE: NORMAL
SPECIMEN SOURCE: NORMAL

## 2018-07-02 ENCOUNTER — TELEPHONE (OUTPATIENT)
Dept: ENDOCRINOLOGY | Facility: CLINIC | Age: 68
End: 2018-07-02

## 2018-07-02 ENCOUNTER — ALLIED HEALTH/NURSE VISIT (OUTPATIENT)
Dept: PHARMACY | Facility: CLINIC | Age: 68
End: 2018-07-02
Attending: HOSPITALIST
Payer: MEDICAID

## 2018-07-02 DIAGNOSIS — Z79.4 TYPE 2 DIABETES MELLITUS WITH COMPLICATION, WITH LONG-TERM CURRENT USE OF INSULIN (H): Primary | ICD-10-CM

## 2018-07-02 DIAGNOSIS — M86.9 OSTEOMYELITIS OF FOOT, UNSPECIFIED LATERALITY, UNSPECIFIED TYPE (H): ICD-10-CM

## 2018-07-02 DIAGNOSIS — N18.9 CHRONIC KIDNEY DISEASE, UNSPECIFIED CKD STAGE: ICD-10-CM

## 2018-07-02 DIAGNOSIS — E11.8 TYPE 2 DIABETES MELLITUS WITH COMPLICATION, WITH LONG-TERM CURRENT USE OF INSULIN (H): Primary | ICD-10-CM

## 2018-07-02 DIAGNOSIS — I10 HYPERTENSION, UNSPECIFIED TYPE: ICD-10-CM

## 2018-07-02 DIAGNOSIS — E78.5 HYPERLIPIDEMIA, UNSPECIFIED HYPERLIPIDEMIA TYPE: ICD-10-CM

## 2018-07-02 DIAGNOSIS — R52 PAIN: ICD-10-CM

## 2018-07-02 PROCEDURE — 99606 MTMS BY PHARM EST 15 MIN: CPT | Performed by: PHARMACIST

## 2018-07-02 PROCEDURE — 99607 MTMS BY PHARM ADDL 15 MIN: CPT | Performed by: PHARMACIST

## 2018-07-02 NOTE — TELEPHONE ENCOUNTER
[facility] Call Center    Phone Message    May a detailed message be left on voicemail: yes    Reason for Call: Other: pt's daughter ginny requesting call back to discuss an appt with Arabella Kamara. Ginny stated that the pt's blood sugars have been very high and the pts needs to be seen asap. Ginny stated that the pt needs to be seen by Arabella Kamara because she knows the pt's case     Action Taken: Message routed to:  [facility] ([facility]): endo clinic coordinators

## 2018-07-02 NOTE — PROGRESS NOTES
SUBJECTIVE/OBJECTIVE:                Supriya Herr is a 68 year old female called for a transitions of care visit.  She was discharged from [facility] on 6/28/18 for Osteomyelitis.  Went in because her foot was bright red. Spoke with her daughter Caroline today.     Chief Complaint: Pt has no new concerns. Went over med list.    Allergies/ADRs: None  Tobacco: 0-1 pack per day - is not interested in quitting sneaks in a cigarette in here and there, but unable to smoke where she is staying.   Alcohol: not currently using  PMH: Reviewed in Epic    Medication Adherence/Access:  Patient uses pill box(es) and has assistance with medication administration: family member, daughter.  Patient takes medications 2 time(s) per day.  Per patient, misses medication 1 times per week. Missed 1 evening in the past week. Been pretty consistent 95%.     CKD: Pt is taking Calcitirol 0.25mg daily. PTH WNL.     Osteomyelitis: Pt is taking Unasyn 3g BID x 6 weeks. Nurse comes by once weekly on Tuesdays.     Hypertension: Current medications include Carvedilol 12.5mg BID, Furosemide 40mg BID (does not weigh at home due to being in wheel chair), a little lymphoedema in leg, possibly due to infection.  Patient does self-monitor BP - 150s in hospital, at home 160s today, 139 yesterday, this week had 124/56. Patient reports no current medication side effects.    Hyperlipidemia: Current therapy includes Atorvastatin 10mg once daily.  Pt reports no significant myalgias or other side effects. Does complain on unilateral arm pain, unclear cause.    The 10-year ASCVD risk score ([place] SOCORRO Jr, et al., 2013) is: 53.5%    Values used to calculate the score:      Age: 68 years      Sex: Female      Is Non- : No      Diabetic: Yes      Tobacco smoker: Yes      Systolic Blood Pressure: 177 mmHg      Is BP treated: Yes      HDL Cholesterol: 45 mg/dL      Total Cholesterol: 179 mg/dL    Diabetes: Pt currently taking Lantus 20 units daily,  Novolog 4 units with meals. Pt is not experiencing side effects. Caroline believes she does not miss doses of insulin, but is unsure.   SMBG: three times daily.   Ranges (patient reported): had a 400 on Saturday. Supposed to see Twila Kamara, but missed appt due to hospitalizations.   FBG: Today: 112, usually really good in the morning.   During the day can spike up. Had a 500 before hospitalization.   Patient is not experiencing hypoglycemia since being home.   Recent symptoms of high blood sugar? none  Eye exam: due. Last visit was 05/2017  Foot exam: up to date  Microalbumin is < 30 mg/g. Pt is not taking an ACEi/ARB.  Aspirin: pt not taking  Diet/Exercise: not reviewed during this visit    Pain: Pt is taking Tylenol 650 mg every 4 hours PRN for pain.    Current labs include:  BP Readings from Last 3 Encounters:   06/28/18 177/60   06/13/18 146/56   05/24/18 171/73     Today's Vitals: There were no vitals taken for this visit.  Lab Results   Component Value Date    A1C 6.0 06/22/2018   .  Lab Results   Component Value Date    CHOL 179 03/29/2018     Lab Results   Component Value Date    TRIG 131 03/29/2018     Lab Results   Component Value Date    HDL 45 03/29/2018     Lab Results   Component Value Date     03/29/2018       Liver Function Studies -   Recent Labs   Lab Test  06/24/18   0623   PROTTOTAL  6.2*   ALBUMIN  2.3*   BILITOTAL  0.2   ALKPHOS  49   AST  16   ALT  17       Lab Results   Component Value Date    UCRR 40 05/24/2018    MICROL 2880 01/09/2018    UMALCR 6037.74 (H) 01/09/2018       Last Basic Metabolic Panel:  Lab Results   Component Value Date     06/28/2018      Lab Results   Component Value Date    POTASSIUM 3.8 06/28/2018     Lab Results   Component Value Date    CHLORIDE 111 06/28/2018     Lab Results   Component Value Date    BUN 77 06/28/2018     Lab Results   Component Value Date    CR 3.52 06/28/2018     GFR Estimate   Date Value Ref Range Status   06/28/2018 13 (L) >60  mL/min/1.7m2 Final     Comment:     Non  GFR Calc   06/27/2018 12 (L) >60 mL/min/1.7m2 Final     Comment:     Non  GFR Calc   06/26/2018 12 (L) >60 mL/min/1.7m2 Final     Comment:     Non  GFR Calc     Most Recent Immunizations   Administered Date(s) Administered     HepB-Adult 05/18/2018     Influenza (High Dose) 3 valent vaccine 09/21/2017     Influenza (IIV3) PF 10/25/2006     Influenza Vaccine IM 3yrs+ 4 Valent IIV4 01/21/2016     Influenza Vaccine, 3 YRS +, IM (QUADRIVALENT W/PRESERVATIVES) 11/21/2016     Pneumo Conj 13-V (2010&after) 08/13/2015     Pneumococcal 23 valent 08/27/2012     TD (ADULT, 7+) 02/08/2006     TDAP Vaccine (Adacel) 07/18/2013     Zoster vaccine, live 08/13/2015     ASSESSMENT:                 Current medications were reviewed today.      Medication Adherence: good, no issues identified    CKD: Needs improvement. Pt may benefit from switching from calcitriol to vitamin D3. KDIGO MBD 2017 update recommends against routine use of Calcitriol. Phosphates are mildly elevated as well. Will discuss with Dr. Basilio.     Osteoimyelitis: Stable.     Hypertension: Needs improvement. Pt not at goal of 130/80 mmHg per ACC/AHA/...PCNA 2017 HTN guideline. Pt may benefit from maximizing Coreg dose to reach goal.    Hyperlipidemia: Needs Improvement. Per ACC/AHA 2013 cholesterol guideline, pt is indicated for high-intensity statin therapy. Considering pt's current bone infection, this may not be a priority but will mention to PCP along with the Coreg recommendation.    Diabetes: Needs improvement. Pt would benefit from making up for the missed appointment with MARY Bob. Per pt she cant get in with MARY Kamara until September. If needed I can call patient and import BG/ make recs before pt sees MARY Kamara.     Pain: Stable.    PLAN:                Pt to do:  1. Make up for the missed appointment with MARY Bob, to discuss optimal BG control.  2.  Continue to monitor and keep a BG journal.    Dr. Basilio to consider:   1. Switching Calcitriol to Vitamin D3 2000 units daily.     Dr. Jackson to consider:  1. Increasing Carvedilol to 25mg BID. Pt BP has not been at goal <130/80.  2. Once pt is more stable, could consider increase Atorvastatin to high intensity dose, due to high ASCVD risk score.     I spent 30 minutes with this patient today. I offer these suggestions for consideration by the PCP. A copy of the visit note was provided to the patient's primary care provider.    Will follow up as needed.    The patient was mailed a summary of these recommendations as an after visit summary.    Truman Lezama, pharmacy student, on 7/2/2018 at 1:55 PM    Brenden Blackwell, PharmD  [facility] Pharmacist    Phone: [phone]

## 2018-07-02 NOTE — MR AVS SNAPSHOT
After Visit Summary   7/2/2018    Supriya Herr    MRN: [id number]           Patient Information     Date Of Birth          1950        Visit Information        Provider Department      7[facility]2/2018 12:30 [facility]        Today's Diagnoses     Type 2 diabetes mellitus with complication, with long-term current use of insulin (H)    -  1    Chronic kidney disease, unspecified CKD stage        Osteomyelitis of foot, unspecified laterality, unspecified type (H)        Hypertension, unspecified type        Hyperlipidemia, unspecified hyperlipidemia type        Pain          Care Instructions    Recommendations from today's MTM visit:                                                      1. Please follow up with MARY Bob, to make up for your missed visit due to the hospitalization.    2. I would encourage you to keep a blood sugar journal so you can discuss about the numbers during visits.    Next MTM visit: I will follow up with you after your visit with Twila.     To schedule another MTM appointment, please call the clinic directly or you may call the MTM scheduling line at [phone] or toll-free at [phone].     My Clinical Pharmacist's contact information:                                                      It was a pleasure seeing you today!  Please feel free to contact me with any questions or concerns you have.     Truman Lezama, pharmacy student, on 7/2/2018 at 2:14 PM    Brenden Blackwell, PharmD  MTM Pharmacist    Phone: [phone]     You may receive a survey about the MTM services you received.  I would appreciate your feedback to help me serve you better in the future. Please fill it out and return it when you can. Your comments will be anonymous.              Follow-ups after your visit        Your next 10 appointments already scheduled     Jul 03, 2018  2:30 PM CDT   Office Visit with Noam Jackson MD   [place]  [facility] ([facility])    [address] [place] [address]  [address]  [facility] [id number] [phone]           Bring a current list of meds and any records pertaining to this visit. For Physicals, please bring immunization records and any forms needing to be filled out. Please arrive 10 minutes early to complete paperwork.            Jul 05, 2018  9:00 AM CDT   (Arrive by 8:45 AM)   New Patient Visit with Zhane Blanc PA-C   [facility] ([facility])    [address] [place] [address]  4th Floor  [facility] [id number] [phone]            Jul 10, 2018 11:00 AM CDT   (Arrive by 10:45 AM)   NEW LUNG NODULE with Ravin King MD   [facility] ([facility])    [address] [place] [address]  [address]  [facility] [id number] [phone]            Jul 18, 2018  3:15 PM CDT   (Arrive by 2:45 PM)   Return Visit with Kathryn Basilio MD   [facility] Neph[facility] ([facility])    [address] [place] [address]  [address]  [facility] [id number] [phone]            Jul 31, 2018  6:50 PM CDT   (Arrive by 6:35 PM)   RETURN FOOT/ANKLE with Alvaro Gautam MD   [facility] ([facility])    [address] [place] [address]  4th Floor  [facility] [id number] [phone]            Aug 02, 2018  9:00 AM CDT   (Arrive by 8:45 AM)   Return Visit with Ewelina Chen MD   [facility] ([facility])    [address] [place] [address]  [address]  [facility] [id number] [phone]              Who to contact     If you have questions or need follow up information about today's clinic visit or your schedule please contact [facility] MEDICATION THERAPY MANAGEMENT directly at [phone].  Normal or non-critical lab and imaging results will be communicated to you by MyChart, letter or phone within 4 business days after the  clinic has received the results. If you do not hear from us within 7 days, please contact the clinic through [employer] or phone. If you have a critical or abnormal lab result, we will notify you by phone as soon as possible.  Submit refill requests through [employer] or call your pharmacy and they will forward the refill request to us. Please allow 3 business days for your refill to be completed.          Additional Information About Your Visit        Care EveryWhere ID     This is your Care EveryWhere ID. This could be used by other organizations to access your [place] medical records  [id number]         Blood Pressure from Last 3 Encounters:   06/28/18 177/60   06/13/18 146/56   05/24/18 171/73    Weight from Last 3 Encounters:   06/25/18 191 lb (86.6 kg)   05/24/18 195 lb 12.8 oz (88.8 kg)   05/21/18 204 lb 3.2 oz (92.6 kg)              Today, you had the following     No orders found for display         Today's Medication Changes          These changes are accurate as of 7/2/18  4:09 PM.  If you have any questions, ask your nurse or doctor.               These medicines have changed or have updated prescriptions.        Dose/Directions    blood glucose monitoring test strip   Commonly known as:  ONETOUCH ULTRA   This may have changed:  Another medication with the same name was removed. Continue taking this medication, and follow the directions you see here.   Used for:  Type 2 diabetes mellitus with hyperglycemia, with long-term current use of insulin (H)   Changed by:  Brenden Blackwell RPH        Test your blood sugar 3-4 times per day.   Quantity:  200 strip   Refills:  3       calcitRIOL 0.25 MCG capsule   Commonly known as:  ROCALTROL   This may have changed:  Another medication with the same name was removed. Continue taking this medication, and follow the directions you see here.   Used for:  Vitamin D deficiency   Changed by:  Brenden Blackwell RPH        TAKE 1 CAPSULE (0.25 MCG) BY MOUTH  DAILY   Quantity:  30 capsule   Refills:  3         Stop taking these medicines if you haven't already. Please contact your care team if you have questions.     MULTIVITAMIN PO   Stopped by:  Brenden Blackwell, [facility]                    Primary Care Provider Office Phone # Fax #    Noam Luis Jackson [phone]       [address] [facility] [address]  [facility] [address]        Equal Access to Services     [facility]: Hadii aad ku hadasho Soomaali, waaxda luqadaha, qaybta kaalmada adeegyada, waxay idiin hayaan adeeg kharash la'aan . So [facility] [phone].    ATENCIÓN: Si carolina ochoa, tiene a santoro disposición servicios gratuitos de asistencia lingüística. Xeniaame al [phone].    We comply with applicable federal civil rights laws and [place] laws. We do not discriminate on the basis of race, color, national origin, age, disability, sex, sexual orientation, or gender identity.            Thank you!     Thank you for choosing [facility] MEDICATION THERAPY MANAGEMENT  for your care. Our goal is always to provide you with excellent care. Hearing back from our patients is one way we can continue to improve our services. Please take a few minutes to complete the written survey that you may receive in the mail after your visit with us. Thank you!             Your Updated Medication List - Protect others around you: Learn how to safely use, store and throw away your medicines at www.disposemymeds.org.          This list is accurate as of 7/2/18  4:09 PM.  Always use your most recent med list.                   Brand Name Dispense Instructions for use Diagnosis    ACE/ARB/ARNI NOT PRESCRIBED (INTENTIONAL)      Please choose reason not prescribed, below    CKD (chronic kidney disease) stage 5, GFR less than 15 ml/min (H)       acetaminophen 325 MG tablet    TYLENOL    100 tablet    Take 2 tablets (650 mg) by mouth every 4 hours as needed for mild pain    Diabetic ulcer of toe of right foot associated with  type 2 diabetes mellitus, with other ulcer severity (H)       albuterol 108 (90 Base) MCG/ACT Inhaler    PROAIR HFA/PROVENTIL HFA/VENTOLIN HFA    3 Inhaler    Inhale 2 puffs into the lungs every 6 hours as needed for shortness of breath / dyspnea or wheezing    Cough       ampicillin-sulbactam 3 (2-1) g Solr vial    UNASYN    82 each    Inject 3 g into the vein every 12 hours    Diabetic ulcer of toe of right foot associated with type 2 diabetes mellitus, with other ulcer severity (H)       atorvastatin 10 MG tablet    LIPITOR    30 tablet    Take 1 tablet (10 mg) by mouth daily    Hyperlipidemia LDL goal <100       blood glucose monitoring test strip    ONETOUCH ULTRA    200 strip    Test your blood sugar 3-4 times per day.    Type 2 diabetes mellitus with hyperglycemia, with long-term current use of insulin (H)       calcitRIOL 0.25 MCG capsule    ROCALTROL    30 capsule    TAKE 1 CAPSULE (0.25 MCG) BY MOUTH DAILY    Vitamin D deficiency       carvedilol 12.5 MG tablet    COREG    60 tablet    Take 1 tablet (12.5 mg) by mouth 2 times daily (with meals)    Essential hypertension with goal blood pressure less than 140/90       clindamycin 1 % lotion    CLEOCIN T    60 mL    Apply topically 2 times daily    Intertrigo       furosemide 40 MG tablet    LASIX    30 tablet    Take 1 tablet (40 mg) by mouth 2 times daily    Lymphedema of both lower extremities       hydrocortisone 1 % cream    CORTAID    60 g    Apply sparingly to affected area two times daily for 14 days.    Intertrigo       insulin glargine 100 UNIT/ML injection    LANTUS    3 mL    Inject 20 Units Subcutaneous At Bedtime    Type 2 diabetes mellitus with diabetic neuropathy, with long-term current use of insulin (H)       insulin pen needle 31G X 6 MM    ULTICARE MINI    100 each    Use daily or as directed.    Type 2 diabetes, HbA1c goal < 7% (H)       NovoLOG FLEXPEN 100 UNIT/ML injection   Generic drug:  insulin aspart     15 mL    Inject 4 units SQ  with breakfast, lunch and dinner.    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    Edema of lower extremity       order for DME     1 Device    1 SAD light    Seasonal affective disorder (H)       triamcinolone 0.025 % ointment    KENALOG    80 g    Apply to sites of dermatitis under the breasts, chest, buttocks - once a day.    Dermatitis       Urea 20 % Crea cream     85 g    Apply topically daily    Xerosis cutis, Tyloma       * Notice:  This list has 3 medication(s) that are the same as other medications prescribed for you. Read the directions carefully, and ask your doctor or other care provider to review them with you.

## 2018-07-02 NOTE — PATIENT INSTRUCTIONS
Recommendations from today's MTM visit:                                                      1. Please follow up with MARY Bob, to make up for your missed visit due to the hospitalization.    2. I would encourage you to keep a blood sugar journal so you can discuss about the numbers during visits.    Next MTM visit: I will follow up with you after your visit with Twila.     To schedule another MTM appointment, please call the clinic directly or you may call the MTM scheduling line at [phone] or toll-free at [phone].     My Clinical Pharmacist's contact information:                                                      It was a pleasure seeing you today!  Please feel free to contact me with any questions or concerns you have.     Truman Lezama, pharmacy student, on 7/2/2018 at 2:14 PM    Brenden Blackwell, PharmD  MTM Pharmacist    Phone: [phone]     You may receive a survey about the MTM services you received.  I would appreciate your feedback to help me serve you better in the future. Please fill it out and return it when you can. Your comments will be anonymous.

## 2018-07-02 NOTE — PROGRESS NOTES
This is a recent snapshot of the patient's [facility] medical record.  For current drug dose and complete information and questions, call [phone] or In Basket pool, [facility]v home infusion ([id number])  CSN Number:  [id number]

## 2018-07-02 NOTE — PROGRESS NOTES
This is a recent snapshot of the patient's [facility] medical record.  For current drug dose and complete information and questions, call [phone] or In Basket pool, fv home infusion ([id number])  CSN Number:  [id number]

## 2018-07-03 ENCOUNTER — OFFICE VISIT (OUTPATIENT)
Dept: FAMILY MEDICINE | Facility: CLINIC | Age: 68
End: 2018-07-03
Payer: MEDICARE

## 2018-07-03 VITALS
TEMPERATURE: 97.9 F | DIASTOLIC BLOOD PRESSURE: 64 MMHG | HEART RATE: 61 BPM | SYSTOLIC BLOOD PRESSURE: 136 MMHG | OXYGEN SATURATION: 96 %

## 2018-07-03 DIAGNOSIS — F43.0 ACUTE REACTION TO STRESS: Primary | ICD-10-CM

## 2018-07-03 DIAGNOSIS — Z79.4 TYPE 2 DIABETES MELLITUS WITH DIABETIC NEUROPATHY, WITH LONG-TERM CURRENT USE OF INSULIN (H): ICD-10-CM

## 2018-07-03 DIAGNOSIS — E11.40 TYPE 2 DIABETES MELLITUS WITH DIABETIC NEUROPATHY, WITH LONG-TERM CURRENT USE OF INSULIN (H): ICD-10-CM

## 2018-07-03 DIAGNOSIS — N18.5 CKD (CHRONIC KIDNEY DISEASE) STAGE 5, GFR LESS THAN 15 ML/MIN (H): ICD-10-CM

## 2018-07-03 DIAGNOSIS — F41.9 ANXIETY AND DEPRESSION: ICD-10-CM

## 2018-07-03 DIAGNOSIS — F32.A ANXIETY AND DEPRESSION: ICD-10-CM

## 2018-07-03 LAB
ANION GAP SERPL CALCULATED.3IONS-SCNC: 11 MMOL/L (ref 3–14)
BASOPHILS # BLD AUTO: 0 10E9/L (ref 0–0.2)
BASOPHILS NFR BLD AUTO: 0.7 %
BUN SERPL-MCNC: 71 MG/DL (ref 7–30)
CALCIUM SERPL-MCNC: 8.7 MG/DL (ref 8.5–10.1)
CHLORIDE SERPL-SCNC: 112 MMOL/L (ref 94–109)
CO2 SERPL-SCNC: 25 MMOL/L (ref 20–32)
CREAT SERPL-MCNC: 3.45 MG/DL (ref 0.52–1.04)
CRP SERPL-MCNC: 3.7 MG/L (ref 0–8)
DIFFERENTIAL METHOD BLD: ABNORMAL
EOSINOPHIL # BLD AUTO: 0.3 10E9/L (ref 0–0.7)
EOSINOPHIL NFR BLD AUTO: 5.1 %
ERYTHROCYTE [DISTWIDTH] IN BLOOD BY AUTOMATED COUNT: 12.7 % (ref 10–15)
GFR SERPL CREATININE-BSD FRML MDRD: 13 ML/MIN/1.7M2
GLUCOSE SERPL-MCNC: 73 MG/DL (ref 70–99)
HCT VFR BLD AUTO: 29.2 % (ref 35–47)
HGB BLD-MCNC: 9.5 G/DL (ref 11.7–15.7)
IMM GRANULOCYTES # BLD: 0 10E9/L (ref 0–0.4)
IMM GRANULOCYTES NFR BLD: 0 %
LYMPHOCYTES # BLD AUTO: 1.8 10E9/L (ref 0.8–5.3)
LYMPHOCYTES NFR BLD AUTO: 32.1 %
MCH RBC QN AUTO: 29.6 PG (ref 26.5–33)
MCHC RBC AUTO-ENTMCNC: 32.5 G/DL (ref 31.5–36.5)
MCV RBC AUTO: 91 FL (ref 78–100)
MONOCYTES # BLD AUTO: 0.3 10E9/L (ref 0–1.3)
MONOCYTES NFR BLD AUTO: 5.1 %
NEUTROPHILS # BLD AUTO: 3.3 10E9/L (ref 1.6–8.3)
NEUTROPHILS NFR BLD AUTO: 57 %
NRBC # BLD AUTO: 0 10*3/UL
NRBC BLD AUTO-RTO: 0 /100
PLATELET # BLD AUTO: 253 10E9/L (ref 150–450)
POTASSIUM SERPL-SCNC: 3.6 MMOL/L (ref 3.4–5.3)
RBC # BLD AUTO: 3.21 10E12/L (ref 3.8–5.2)
SODIUM SERPL-SCNC: 148 MMOL/L (ref 133–144)
WBC # BLD AUTO: 5.7 10E9/L (ref 4–11)

## 2018-07-03 PROCEDURE — 86140 C-REACTIVE PROTEIN: CPT | Performed by: FAMILY MEDICINE

## 2018-07-03 PROCEDURE — 99214 OFFICE O/P EST MOD 30 MIN: CPT | Performed by: FAMILY MEDICINE

## 2018-07-03 PROCEDURE — 80048 BASIC METABOLIC PNL TOTAL CA: CPT | Performed by: FAMILY MEDICINE

## 2018-07-03 PROCEDURE — 85025 COMPLETE CBC W/AUTO DIFF WBC: CPT | Performed by: FAMILY MEDICINE

## 2018-07-03 NOTE — TELEPHONE ENCOUNTER
I spoke with her daughter and Dr Jackson increased her lantus to 25 units . They will call later in the week if numbers are still high .

## 2018-07-03 NOTE — MR AVS SNAPSHOT
After Visit Summary   7/3/2018    Supriya Herr    MRN: [id number]           Patient Information     Date Of Birth          1950        Visit Information        Provider Department      7/3/2018 2:30 PM Noam Jackson MD [facility]        Today's Diagnoses     Acute reaction to stress    -  1    Type 2 diabetes mellitus with diabetic neuropathy, with long-term current use of insulin (H)           Follow-ups after your visit        Additional Services     MENTAL HEALTH REFERRAL  - Adult; Outpatient Treatment; Individual/Couples/Family/Group Therapy/Health Psychology; FMG: [facility] [phone]; We will contact you to schedule the appointment or please call with any questions       All scheduling is subject to the client's specific insurance plan & benefits, provider/location availability, and provider clinical specialities.  Please arrive 15 minutes early for your first appointment and bring your completed paperwork.    Please be aware that coverage of these services is subject to the terms and limitations of your health insurance plan.  Call member services at your health plan with any benefit or coverage questions.                            Your next 10 appointments already scheduled     Jul 05, 2018  9:00 AM CDT   (Arrive by 8:45 AM)   New Patient Visit with Zhane Blanc PA-C   [facility] ([facility] [place])    [address]  4th Floor  [facility] [address] [phone]            Jul 10, 2018 11:00 AM CDT   (Arrive by 10:45 AM)   NEW LUNG NODULE with Ravin King MD   [facility] ([facility] [place] [facility])    [address]  [address]  [facility] [id number] [phone]            Jul 18, 2018  3:15 PM CDT   (Arrive by 2:45 PM)   Return Visit with Kathryn Basilio MD   [facility] ([facility])    [address] [place] [address]  [address]  [address]  [facility] [address]   [phone]            Jul 31, 2018  6:50 PM CDT   (Arrive by 6:35 PM)   RETURN FOOT/ANKLE with Alvaro Gautam MD   [facility] ([facility] [place])    [address] [place] [address] [address]  4th Floor  [facility] [address]   [phone]            Aug 02, 2018  9:00 AM CDT   (Arrive by 8:45 AM)   Return Visit with Ewelina Chen MD   [facility] ([facility])    [address] [place] [address] [address]  [address]  [facility] [address]   [phone]              Who to contact     If you have questions or need follow up information about today's clinic visit or your schedule please contact [facility] directly at [phone].  Normal or non-critical lab and imaging results will be communicated to you by MyChart, letter or phone within 4 business days after the clinic has received the results. If you do not hear from us within 7 days, please contact the clinic through MyChart or phone. If you have a critical or abnormal lab result, we will notify you by phone as soon as possible.  Submit refill requests through [employer] or call your pharmacy and they will forward the refill request to us. Please allow 3 business days for your refill to be completed.          Additional Information About Your Visit        Care EveryWhere ID     This is your Care EveryWhere ID. This could be used by other organizations to access your [place] medical records  [id number]        Your Vitals Were     Pulse Temperature Pulse Oximetry             61 97.9  F (36.6  C) (Oral) 96%          Blood Pressure from Last 3 Encounters:   07/03/18 136/64   06/28/18 177/60   06/13/18 146/56    Weight from Last 3 Encounters:   06/25/18 191 lb (86.6 kg)   05/24/18 195 lb 12.8 oz (88.8 kg)   05/21/18 204 lb 3.2 oz (92.6 kg)              We Performed the Following     MENTAL HEALTH REFERRAL  - Adult; Outpatient Treatment;  Individual/Couples/Family/Group Therapy/Health Psychology; FMG: [facility] [phone]; We will contact you to schedule the appointment or please call with any questions          Today's Medication Changes          These changes are accurate as of 7/3/18  3:13 PM.  If you have any questions, ask your nurse or doctor.               These medicines have changed or have updated prescriptions.        Dose/Directions    insulin glargine 100 UNIT/ML injection   Commonly known as:  LANTUS   This may have changed:  how much to take   Used for:  Type 2 diabetes mellitus with diabetic neuropathy, with long-term current use of insulin (H)   Changed by:  Noam Jackson MD        Dose:  25 Units   Inject 25 Units Subcutaneous At Bedtime   Quantity:  3 mL   Refills:  1            Where to get your medicines      These medications were sent to [facility]/pharmacy #[id number] - [place], [place] - [address]  [address] [facility] [address]     Phone:  [phone]     insulin glargine 100 UNIT/ML injection                Primary Care Provider Office Phone # Fax #    Noam Jackson [phone]       [address]  [facility] [address]        Equal Access to Services     [facility]: Hadii aad ku hadasho Soomaali, waaxda luqadaha, qaybta kaalmada adeegyada, waxay sonal chaudhari. So [facility] [phone].    ATENCIÓN: Si habla español, tiene a santoro disposición servicios gratuitos de asistencia lingüística. Renuka al [phone].    We comply with applicable federal civil rights laws and [place] laws. We do not discriminate on the basis of race, color, national origin, age, disability, sex, sexual orientation, or gender identity.            Thank you!     Thank you for choosing [facility]  for your care. Our goal is always to provide you with excellent care. Hearing back from our patients is one way we can continue to improve  our services. Please take a few minutes to complete the written survey that you may receive in the mail after your visit with us. Thank you!             Your Updated Medication List - Protect others around you: Learn how to safely use, store and throw away your medicines at www.disposemymeds.org.          This list is accurate as of 7/3/18  3:13 PM.  Always use your most recent med list.                   Brand Name Dispense Instructions for use Diagnosis    ACE/ARB/ARNI NOT PRESCRIBED (INTENTIONAL)      Please choose reason not prescribed, below    CKD (chronic kidney disease) stage 5, GFR less than 15 ml/min (H)       acetaminophen 325 MG tablet    TYLENOL    100 tablet    Take 2 tablets (650 mg) by mouth every 4 hours as needed for mild pain    Diabetic ulcer of toe of right foot associated with type 2 diabetes mellitus, with other ulcer severity (H)       albuterol 108 (90 Base) MCG/ACT Inhaler    PROAIR HFA/PROVENTIL HFA/VENTOLIN HFA    3 Inhaler    Inhale 2 puffs into the lungs every 6 hours as needed for shortness of breath / dyspnea or wheezing    Cough       ampicillin-sulbactam 3 (2-1) g Solr vial    UNASYN    82 each    Inject 3 g into the vein every 12 hours    Diabetic ulcer of toe of right foot associated with type 2 diabetes mellitus, with other ulcer severity (H)       atorvastatin 10 MG tablet    LIPITOR    30 tablet    Take 1 tablet (10 mg) by mouth daily    Hyperlipidemia LDL goal <100       blood glucose monitoring test strip    ONETOUCH ULTRA    200 strip    Test your blood sugar 3-4 times per day.    Type 2 diabetes mellitus with hyperglycemia, with long-term current use of insulin (H)       calcitRIOL 0.25 MCG capsule    ROCALTROL    30 capsule    TAKE 1 CAPSULE (0.25 MCG) BY MOUTH DAILY    Vitamin D deficiency       carvedilol 12.5 MG tablet    COREG    60 tablet    Take 1 tablet (12.5 mg) by mouth 2 times daily (with meals)    Essential hypertension with goal blood pressure less than 140/90        clindamycin 1 % lotion    CLEOCIN T    60 mL    Apply topically 2 times daily    Intertrigo       furosemide 40 MG tablet    LASIX    30 tablet    Take 1 tablet (40 mg) by mouth 2 times daily    Lymphedema of both lower extremities       insulin glargine 100 UNIT/ML injection    LANTUS    3 mL    Inject 25 Units Subcutaneous At Bedtime    Type 2 diabetes mellitus with diabetic neuropathy, with long-term current use of insulin (H)       insulin pen needle 31G X 6 MM    ULTICARE MINI    100 each    Use daily or as directed.    Type 2 diabetes, HbA1c goal < 7% (H)       NovoLOG FLEXPEN 100 UNIT/ML injection   Generic drug:  insulin aspart     15 mL    Inject 4 units SQ with breakfast, lunch and dinner.    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    Edema of lower extremity       order for DME     1 Device    1 SAD light    Seasonal affective disorder (H)       Urea 20 % Crea cream     85 g    Apply topically daily    Xerosis cutis, Tyloma       * Notice:  This list has 3 medication(s) that are the same as other medications prescribed for you. Read the  directions carefully, and ask your doctor or other care provider to review them with you.

## 2018-07-03 NOTE — PROGRESS NOTES
SUBJECTIVE:   Supriya Herr is a 68 year old female who presents to clinic today for the following health issues:        Hospital Follow-up Visit:    Hospital/Nursing Home/IP Rehab Facility: [facility]  Date of Admission: 06/23/2018  Date of Discharge: 06/28/2018  Reason(s) for Admission: right foot infection             Problems taking medications regularly:  None       Medication changes since discharge: Unasyn        Problems adhering to non-medication therapy:  None  Summary of hospitalization:  [facility] discharge summary reviewed  Diagnostic Tests/Treatments reviewed.  Follow up needed: wound care, transplant team, renal specialist  Other Healthcare Providers Involved in Patient s Care:         Specialist appointment - see note  Update since discharge: improved. Needing home care help as sonny is getting burnt out    Post Discharge Medication Reconciliation: discharge medications reconciled, continue medications without change.  Plan of care communicated with patient and family     Coding guidelines for this visit:  Type of Medical   Decision Making Face-to-Face Visit       within 7 Days of discharge Face-to-Face Visit        within 14 days of discharge   Moderate Complexity [id number]   High Complexity [id number]              Diabetes Follow-up    Patient is checking blood sugars: twice daily.    Blood sugar testing frequency justification: Uncontrolled diabetes and Adjustment of medication(s)  Results are as follows:         As high as 500 this week    Diabetic concerns: blood sugar frequently over 200     Symptoms of hypoglycemia (low blood sugar): none     Paresthesias (numbness or burning in feet) or sores: Yes , chronic     Date of last diabetic eye exam: 2017    BP Readings from Last 2 Encounters:   07/03/18 136/64   06/28/18 177/60     Hemoglobin A1C (%)   Date Value   06/22/2018 6.0 (H)   03/29/2018 5.4     LDL Cholesterol Calculated (mg/dL)   Date Value    03/29/2018 108 (H)   09/21/2017 68       Diabetes Management Resources    Problem list and histories reviewed & adjusted, as indicated.  Additional history: as documented    Labs reviewed in EPIC    Reviewed and updated as needed this visit by clinical staff       Reviewed and updated as needed this visit by Provider         ROS:  Constitutional, HEENT, cardiovascular, pulmonary, gi and gu systems are negative, except as otherwise noted.    OBJECTIVE:     /64  Pulse 61  Temp 97.9  F (36.6  C) (Oral)  SpO2 96%  There is no height or weight on file to calculate BMI.  GENERAL: alert, frail, elderly and fatigued  NECK: no adenopathy, no asymmetry, masses, or scars and thyroid normal to palpation  RESP: lungs clear to auscultation - no rales, rhonchi or wheezes  CV: regular rates and rhythm  PSYCH: mentation appears normal, tearful, anxious, fatigued and judgement and insight intact    Diagnostic Test Results:  Results for orders placed or performed in visit on 07/01/18   Basic metabolic panel   Result Value Ref Range    Sodium 148 (H) 133 - 144 mmol/L    Potassium 3.6 3.4 - 5.3 mmol/L    Chloride 112 (H) 94 - 109 mmol/L    Carbon Dioxide 25 20 - 32 mmol/L    Anion Gap 11 3 - 14 mmol/L    Glucose 73 70 - 99 mg/dL    Urea Nitrogen 71 (H) 7 - 30 mg/dL    Creatinine 3.45 (H) 0.52 - 1.04 mg/dL    GFR Estimate 13 (L) >60 mL/min/1.7m2    GFR Estimate If Black 16 (L) >60 mL/min/1.7m2    Calcium 8.7 8.5 - 10.1 mg/dL   CBC with platelets differential   Result Value Ref Range    WBC 5.7 4.0 - 11.0 10e9/L    RBC Count 3.21 (L) 3.8 - 5.2 10e12/L    Hemoglobin 9.5 (L) 11.7 - 15.7 g/dL    Hematocrit 29.2 (L) 35.0 - 47.0 %    MCV 91 78 - 100 fl    MCH 29.6 26.5 - 33.0 pg    MCHC 32.5 31.5 - 36.5 g/dL    RDW 12.7 10.0 - 15.0 %    Platelet Count 253 150 - 450 10e9/L    Diff Method Automated Method     % Neutrophils 57.0 %    % Lymphocytes 32.1 %    % Monocytes 5.1 %    % Eosinophils 5.1 %    % Basophils 0.7 %    % Immature  Granulocytes 0.0 %    Nucleated RBCs 0 0 /100    Absolute Neutrophil 3.3 1.6 - 8.3 10e9/L    Absolute Lymphocytes 1.8 0.8 - 5.3 10e9/L    Absolute Monocytes 0.3 0.0 - 1.3 10e9/L    Absolute Eosinophils 0.3 0.0 - 0.7 10e9/L    Absolute Basophils 0.0 0.0 - 0.2 10e9/L    Abs Immature Granulocytes 0.0 0 - 0.4 10e9/L    Absolute Nucleated RBC 0.0    CRP inflammation   Result Value Ref Range    CRP Inflammation 3.7 0.0 - 8.0 mg/L     *Note: Due to a large number of results and/or encounters for the requested time period, some results have not been displayed. A complete set of results can be found in Results Review.       ASSESSMENT/PLAN:             1. Type 2 diabetes mellitus with diabetic neuropathy, with long-term current use of insulin (H)  Running higher which sacre s her   for now increase  - insulin glargine (LANTUS SOLOSTAR) 100 UNIT/ML pen; Inject 25 Units Subcutaneous At Bedtime  Dispense: 3 mL; Refill: 1  Follow up with consultant as planned. ( Dr Kamara)  2. Acute reaction to stress  Worse  Needs therapy  - MENTAL HEALTH REFERRAL  - Adult; Outpatient Treatment; Individual/Couples/Family/Group Therapy/Health Psychology[place] [facility] [place] [facility] [phone]; We will contact you to schedule the appointment or please call with any questions  - start sertraline (ZOLOFT) 50 MG tablet; Take 1/2 tablet (25 mg) for 1-2 weeks, then increase to 1 tablet orally daily  Dispense: 30 tablet; Refill: 0  Follow up in 1 month.   3. CKD (chronic kidney disease) stage 5, GFR less than 15 ml/min (H)  worseing  Follow up with consultant as planned.     4. Anxiety and depression  See above  Care coordinator helping      Follow up in 1 month.   See Patient Instructions    Noam Jackson MD  [facility]

## 2018-07-04 ENCOUNTER — HOME INFUSION (PRE-WILLOW HOME INFUSION) (OUTPATIENT)
Dept: PHARMACY | Facility: CLINIC | Age: 68
End: 2018-07-04

## 2018-07-05 ENCOUNTER — HOME INFUSION (PRE-WILLOW HOME INFUSION) (OUTPATIENT)
Dept: PHARMACY | Facility: CLINIC | Age: 68
End: 2018-07-05

## 2018-07-05 ENCOUNTER — OFFICE VISIT (OUTPATIENT)
Dept: WOUND CARE | Facility: CLINIC | Age: 68
End: 2018-07-05
Payer: MEDICARE

## 2018-07-05 DIAGNOSIS — L97.512 PLANTAR ULCER OF RIGHT FOOT WITH FAT LAYER EXPOSED (H): Primary | ICD-10-CM

## 2018-07-05 DIAGNOSIS — Z79.4 TYPE 2 DIABETES MELLITUS WITH COMPLICATION, WITH LONG-TERM CURRENT USE OF INSULIN (H): ICD-10-CM

## 2018-07-05 DIAGNOSIS — E11.8 TYPE 2 DIABETES MELLITUS WITH COMPLICATION, WITH LONG-TERM CURRENT USE OF INSULIN (H): ICD-10-CM

## 2018-07-05 ASSESSMENT — PAIN SCALES - GENERAL: PAINLEVEL: NO PAIN (0)

## 2018-07-05 NOTE — PROGRESS NOTES
Chief Complaint:   Chief Complaint   Patient presents with     WOUND CARE     New patient consult, had I & D on rigth foot on 6/24/18.     Subjective: Supriya is a 68 year old female who presents to the clinic today for follow up of diabetic foot ulceration. Supriya was admitted to [facility] from 6/23 - 6/28, discharged with IV unasyn for antibiotic therapy. Pertinent hospital records and laboratory studies reviewed. Home health nursing coming to house 3 times per week, her daughter helps otherwise. They have been applying bandages to wound after cleaning it. Still avoiding the shower. Has appointment with Dr. Gautam at the end of this month for discussion of possible 2nd met head excision per Dr. Pack.    ROS: Denies confusion, n/v, fever, chills, purulent drainage, foot pain or redness, foot swelling.    Allergies   Allergen Reactions     Nkda [No Known Drug Allergies]      Current Outpatient Rx   Medication Sig Dispense Refill     ACE/ARB/ARNI NOT PRESCRIBED, INTENTIONAL, Please choose reason not prescribed, below       acetaminophen (TYLENOL) 325 MG tablet Take 2 tablets (650 mg) by mouth every 4 hours as needed for mild pain 100 tablet 0     albuterol (PROAIR HFA, PROVENTIL HFA, VENTOLIN HFA) 108 (90 BASE) MCG/ACT inhaler Inhale 2 puffs into the lungs every 6 hours as needed for shortness of breath / dyspnea or wheezing 3 Inhaler 1     ampicillin-sulbactam (UNASYN) 3 (2-1) g SOLR vial Inject 3 g into the vein every 12 hours 82 each 0     atorvastatin (LIPITOR) 10 MG tablet Take 1 tablet (10 mg) by mouth daily 30 tablet      blood glucose monitoring (ONETOUCH ULTRA) test strip Test your blood sugar 3-4 times per day. 200 strip 3     calcitRIOL (ROCALTROL) 0.25 MCG capsule TAKE 1 CAPSULE (0.25 MCG) BY MOUTH DAILY 30 capsule 3     carvedilol (COREG) 12.5 MG tablet Take 1 tablet (12.5 mg) by mouth 2 times daily (with meals) 60 tablet 11     clindamycin (CLEOCIN T) 1 % lotion Apply topically 2 times daily 60 mL 3      furosemide (LASIX) 40 MG tablet Take 1 tablet (40 mg) by mouth 2 times daily 30 tablet 11     insulin glargine (LANTUS SOLOSTAR) 100 UNIT/ML pen Inject 25 Units Subcutaneous At Bedtime 3 mL 1     insulin pen needle (ULTICARE MINI) 31G X 6 MM Use daily or as directed. 100 each prn     NOVOLOG FLEXPEN 100 UNIT/ML soln Inject 4 units SQ with breakfast, lunch and dinner. 15 mL 3     order for DME 1 SAD light 1 Device 1     order for DME Equipment being ordered: Right Lower extremity Solaris Ready wrap calf piece:  Size small/length tall , knee piece: Size small , Thigh piece size small/length average. 1 Units 0     order for DME 1 wheelchair 1 Device 0     order for DME Equipment being ordered: TEDS stocking   Below the knee 15-20 mg  Dispense 2  Use daily 2 Device 1     ORDER FOR DME Equipment being ordered: Compression stockings, 20-30 MMHG, knee high 1 Device 0     sertraline (ZOLOFT) 50 MG tablet Take 1/2 tablet (25 mg) for 1-2 weeks, then increase to 1 tablet orally daily 30 tablet 0     Urea 20 % CREA cream Apply topically daily 85 g 3       Objective:   There were no vitals taken for this visit.    General: Patient is well groomed, appears stated age, is alert and cooperative, and is in no acute distress.  Skin: LE skin color, texture and turgor are normal.    A diabetic ulcer is noted at right  plantar foot, in 1st metatarsal interspace measuring 0.6 cm x 0.4 cm x 0.2 cm. Nontender to palpation.  Tissue Depth: subcutaneous  Wound base: Pink/Granulation  Edges: intact  Drainage: none/none  Odor: No   Undermining: No  Tunneling:  No   Bone Exposure: No  Clinical Signs of Infection: No    Lab Results   Component Value Date    WBC 5.7 07/03/2018     Lab Results   Component Value Date    RBC 3.21 07/03/2018     Lab Results   Component Value Date    HGB 9.5 07/03/2018     Lab Results   Component Value Date    HCT 29.2 07/03/2018     No components found for: MCT  Lab Results   Component Value Date    MCV 91 07/03/2018      Lab Results   Component Value Date    MCH 29.6 07/03/2018     Lab Results   Component Value Date    MCHC 32.5 07/03/2018     Lab Results   Component Value Date    RDW 12.7 07/03/2018     Lab Results   Component Value Date     07/03/2018     Last Basic Metabolic Panel:  Lab Results   Component Value Date     07/03/2018      Lab Results   Component Value Date    POTASSIUM 3.6 07/03/2018     Lab Results   Component Value Date    CHLORIDE 112 07/03/2018     Lab Results   Component Value Date    JODY 8.7 07/03/2018     Lab Results   Component Value Date    CO2 25 07/03/2018     Lab Results   Component Value Date    BUN 71 07/03/2018     Lab Results   Component Value Date    CR 3.45 07/03/2018     Lab Results   Component Value Date    GLC 73 07/03/2018     CRP 7/3/18   3.7  A1C  6/22/18   6.0%    Assessment:   - Diabetic ulcer of right foot  - Right foot deformity  - Hx of left AKA  - DM type 2, controlled    Plan:   - No signs or symptoms of infection today.   - Continue IV antibiotic therapy  - Continue DH shoe  - Recommend spraying with microklenz, pat dry. Apply medihoney and padded bandage daily to every other day.  - Await appointment with ID and Dr. Gautam.   - Follow up with me or Dr. Pack in 3 weeks

## 2018-07-05 NOTE — NURSING NOTE
Chief Complaint   Patient presents with     WOUND CARE     New patient consult, had I & D on rigth foot on 6/24/18.       There were no vitals filed for this visit.    There is no height or weight on file to calculate BMI.      WOUND EVALUATION    No vitals obtained, ferny Blanc PA-C.    -JOSE FRANCISCO VillarrealN

## 2018-07-05 NOTE — MR AVS SNAPSHOT
After Visit Summary   7/5/2018    Supriya Herr    MRN: [id number]           Patient Information     Date Of Birth          1950        Visit Information        Provider Department      7/5/2018 9:00 AM Zhane Blanc PA-C [facility]        Today's Diagnoses     Plantar ulcer of right foot with fat layer exposed (H)    -  1    Type 2 diabetes mellitus with complication, with long-term current use of insulin (H)           Follow-ups after your visit        Your next 10 appointments already scheduled     Jul 10, 2018 11:00 AM CDT   (Arrive by 10:45 AM)   NEW LUNG NODULE with Ravin King MD   [facility] ([facility])    [address]  [address]  [place] [id number] [phone]            Jul 18, 2018  3:15 PM CDT   (Arrive by 2:45 PM)   Return Visit with Kathryn Basilio MD   [facility] ([facility])    [address]  [address]  [place] [id number] [phone]            Jul 31, 2018  6:50 PM CDT   (Arrive by 6:35 PM)   RETURN FOOT/ANKLE with Alvaro Gautam MD   [facility] ([facility])    [address]  4th Floor  [place] [id number] [phone]            Aug 02, 2018  9:00 AM CDT   (Arrive by 8:45 AM)   Return Visit with Ewelina Chen MD   [facility] ([facility])    [address]  [address]  [place] [id number] [phone]              Who to contact     Please call your clinic at [phone] to:    Ask questions about your health    Make or cancel appointments    Discuss your medicines    Learn about your test results    Speak to your doctor            Additional Information About Your Visit        Care EveryWhere ID     This is your Care EveryWhere ID. This could be used by other organizations to access your [place]  medical records  [id number]         Blood Pressure from Last 3 Encounters:   07/03/18 136/64   06/28/18 177/60   06/13/18 146/56    Weight from Last 3 Encounters:   06/25/18 191 lb   05/24/18 195 lb 12.8 oz   05/21/18 204 lb 3.2 oz              Today, you had the following     No orders found for display       Primary Care Provider Office Phone # Fax #    Noam Luis Jackson [phone]       [address] [facility] [address]  [facility] [address]        Equal Access to Services     [facility]: Hadii aad ku hadasho Soomaali, waaxda luqadaha, qaybta kaalmada ailin, madeline sood . So [facility] [phone].    ATENCIÓN: Si habla español, tiene a santoro disposición servicios gratuitos de asistencia lingüística. [facility] [phone].    We comply with applicable federal civil rights laws and [place] laws. We do not discriminate on the basis of race, color, national origin, age, disability, sex, sexual orientation, or gender identity.            Thank you!     Thank you for choosing [facility]  for your care. Our goal is always to provide you with excellent care. Hearing back from our patients is one way we can continue to improve our services. Please take a few minutes to complete the written survey that you may receive in the mail after your visit with us. Thank you!             Your Updated Medication List - Protect others around you: Learn how to safely use, store and throw away your medicines at www.disposemymeds.org.          This list is accurate as of 7/5/18  9:53 AM.  Always use your most recent med list.                   Brand Name Dispense Instructions for use Diagnosis    ACE/ARB/ARNI NOT PRESCRIBED (INTENTIONAL)      Please choose reason not prescribed, below    CKD (chronic kidney disease) stage 5, GFR less than 15 ml/min (H)       acetaminophen 325 MG tablet    TYLENOL    100 tablet    Take 2 tablets (650 mg) by mouth every 4 hours as needed for mild pain     Diabetic ulcer of toe of right foot associated with type 2 diabetes mellitus, with other ulcer severity (H)       albuterol 108 (90 Base) MCG/ACT Inhaler    PROAIR HFA/PROVENTIL HFA/VENTOLIN HFA    3 Inhaler    Inhale 2 puffs into the lungs every 6 hours as needed for shortness of breath / dyspnea or wheezing    Cough       ampicillin-sulbactam 3 (2-1) g Solr vial    UNASYN    82 each    Inject 3 g into the vein every 12 hours    Diabetic ulcer of toe of right foot associated with type 2 diabetes mellitus, with other ulcer severity (H)       atorvastatin 10 MG tablet    LIPITOR    30 tablet    Take 1 tablet (10 mg) by mouth daily    Hyperlipidemia LDL goal <100       blood glucose monitoring test strip    ONETOUCH ULTRA    200 strip    Test your blood sugar 3-4 times per day.    Type 2 diabetes mellitus with hyperglycemia, with long-term current use of insulin (H)       calcitRIOL 0.25 MCG capsule    ROCALTROL    30 capsule    TAKE 1 CAPSULE (0.25 MCG) BY MOUTH DAILY    Vitamin D deficiency       carvedilol 12.5 MG tablet    COREG    60 tablet    Take 1 tablet (12.5 mg) by mouth 2 times daily (with meals)    Essential hypertension with goal blood pressure less than 140/90       clindamycin 1 % lotion    CLEOCIN T    60 mL    Apply topically 2 times daily    Intertrigo       furosemide 40 MG tablet    LASIX    30 tablet    Take 1 tablet (40 mg) by mouth 2 times daily    Lymphedema of both lower extremities       insulin glargine 100 UNIT/ML injection    LANTUS    3 mL    Inject 25 Units Subcutaneous At Bedtime    Type 2 diabetes mellitus with diabetic neuropathy, with long-term current use of insulin (H)       insulin pen needle 31G X 6 MM    ULTICARE MINI    100 each    Use daily or as directed.    Type 2 diabetes, HbA1c goal < 7% (H)       NovoLOG FLEXPEN 100 UNIT/ML injection   Generic drug:  insulin aspart     15 mL    Inject 4 units SQ with breakfast, lunch and dinner.    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    Edema of lower extremity       order for DME     1 Device    1 SAD light    Seasonal affective disorder (H)       sertraline 50 MG tablet    ZOLOFT    30 tablet    Take 1/2 tablet (25 mg) for 1-2 weeks, then increase to 1 tablet orally daily    Acute reaction to stress       Urea 20 % Crea cream     85 g    Apply topically daily    Xerosis cutis, Tyloma       * Notice:  This list has 3 medication(s) that are the same as other medications prescribed for you. Read the directions carefully, and ask your doctor or other care provider to review them with you.

## 2018-07-05 NOTE — PROGRESS NOTES
This is a recent snapshot of the patient's [facility] medical record.  For current drug dose and complete information and questions, call [phone] or In Basket pool, [facility] home infusion ([id number])  CSN Number:  [id number]

## 2018-07-05 NOTE — LETTER
7/5/2018       RE: Supriya Herr  [address]  [facility] [address]     Dear Colleague,    Thank you for referring your patient, Supriya Herr, to the [facility] WOUND [facility]ARE at [facility]. Please see a copy of my visit note below.    Chief Complaint:   Chief Complaint   Patient presents with     WOUND CARE     New patient consult, had I & D on rigth foot on 6/24/18.     Subjective: Supriya is a 68 year old female who presents to the clinic today for follow up of diabetic foot ulceration. Supriya was admitted to [facility] from 6/23 - 6/28, discharged with IV unasyn for antibiotic therapy. Pertinent hospital records and laboratory studies reviewed. Home health nursing coming to house 3 times per week, her daughter helps otherwise. They have been applying bandages to wound after cleaning it. Still avoiding the shower. Has appointment with Dr. Gautam at the end of this month for discussion of possible 2nd met head excision per Dr. Pack.    ROS: Denies confusion, n/v, fever, chills, purulent drainage, foot pain or redness, foot swelling.    Allergies   Allergen Reactions     Nkda [No Known Drug Allergies]      Current Outpatient Rx   Medication Sig Dispense Refill     ACE/ARB/ARNI NOT PRESCRIBED, INTENTIONAL, Please choose reason not prescribed, below       acetaminophen (TYLENOL) 325 MG tablet Take 2 tablets (650 mg) by mouth every 4 hours as needed for mild pain 100 tablet 0     albuterol (PROAIR HFA, PROVENTIL HFA, VENTOLIN HFA) 108 (90 BASE) MCG/ACT inhaler Inhale 2 puffs into the lungs every 6 hours as needed for shortness of breath / dyspnea or wheezing 3 Inhaler 1     ampicillin-sulbactam (UNASYN) 3 (2-1) g SOLR vial Inject 3 g into the vein every 12 hours 82 each 0     atorvastatin (LIPITOR) 10 MG tablet Take 1 tablet (10 mg) by mouth daily 30 tablet      blood glucose monitoring (ONETOUCH ULTRA) test strip Test your blood sugar 3-4 times per day. 200 strip 3      calcitRIOL (ROCALTROL) 0.25 MCG capsule TAKE 1 CAPSULE (0.25 MCG) BY MOUTH DAILY 30 capsule 3     carvedilol (COREG) 12.5 MG tablet Take 1 tablet (12.5 mg) by mouth 2 times daily (with meals) 60 tablet 11     clindamycin (CLEOCIN T) 1 % lotion Apply topically 2 times daily 60 mL 3     furosemide (LASIX) 40 MG tablet Take 1 tablet (40 mg) by mouth 2 times daily 30 tablet 11     insulin glargine (LANTUS SOLOSTAR) 100 UNIT/ML pen Inject 25 Units Subcutaneous At Bedtime 3 mL 1     insulin pen needle (ULTICARE MINI) 31G X 6 MM Use daily or as directed. 100 each prn     NOVOLOG FLEXPEN 100 UNIT/ML soln Inject 4 units SQ with breakfast, lunch and dinner. 15 mL 3     order for DME 1 SAD light 1 Device 1     order for DME Equipment being ordered: Right Lower extremity Solaris Ready wrap calf piece:  Size small/length tall , knee piece: Size small , Thigh piece size small/length average. 1 Units 0     order for DME 1 wheelchair 1 Device 0     order for DME Equipment being ordered: TEDS stocking   Below the knee 15-20 mg  Dispense 2  Use daily 2 Device 1     ORDER FOR DME Equipment being ordered: Compression stockings, 20-30 MMHG, knee high 1 Device 0     sertraline (ZOLOFT) 50 MG tablet Take 1/2 tablet (25 mg) for 1-2 weeks, then increase to 1 tablet orally daily 30 tablet 0     Urea 20 % CREA cream Apply topically daily 85 g 3       Objective:   There were no vitals taken for this visit.    General: Patient is well groomed, appears stated age, is alert and cooperative, and is in no acute distress.  Skin: LE skin color, texture and turgor are normal.    A diabetic ulcer is noted at right  plantar foot, in 1st metatarsal interspace measuring 0.6 cm x 0.4 cm x 0.2 cm. Nontender to palpation.  Tissue Depth: subcutaneous  Wound base: Pink/Granulation  Edges: intact  Drainage: none/none  Odor: No   Undermining: No  Tunneling:  No   Bone Exposure: No  Clinical Signs of Infection: No    Lab Results   Component Value Date    WBC 5.7  07/03/2018     Lab Results   Component Value Date    RBC 3.21 07/03/2018     Lab Results   Component Value Date    HGB 9.5 07/03/2018     Lab Results   Component Value Date    HCT 29.2 07/03/2018     No components found for: MCT  Lab Results   Component Value Date    MCV 91 07/03/2018     Lab Results   Component Value Date    MCH 29.6 07/03/2018     Lab Results   Component Value Date    MCHC 32.5 07/03/2018     Lab Results   Component Value Date    RDW 12.7 07/03/2018     Lab Results   Component Value Date     07/03/2018     Last Basic Metabolic Panel:  Lab Results   Component Value Date     07/03/2018      Lab Results   Component Value Date    POTASSIUM 3.6 07/03/2018     Lab Results   Component Value Date    CHLORIDE 112 07/03/2018     Lab Results   Component Value Date    JODY 8.7 07/03/2018     Lab Results   Component Value Date    CO2 25 07/03/2018     Lab Results   Component Value Date    BUN 71 07/03/2018     Lab Results   Component Value Date    CR 3.45 07/03/2018     Lab Results   Component Value Date    GLC 73 07/03/2018     CRP 7/3/18   3.7  A1C  6/22/18   6.0%    Assessment:   - Diabetic ulcer of right foot  - Right foot deformity  - Hx of left AKA  - DM type 2, controlled    Plan:   - No signs or symptoms of infection today.   - Continue IV antibiotic therapy  - Continue DH shoe  - Recommend spraying with microklenz, pat dry. Apply medihoney and padded bandage daily to every other day.  - Await appointment with ID and Dr. Gautam.   - Follow up with me or Dr. Pack in 3 weeks      Again, thank you for allowing me to participate in the care of your patient.      Sincerely,    Zhane Blanc PA-C

## 2018-07-06 ENCOUNTER — HOME INFUSION (PRE-WILLOW HOME INFUSION) (OUTPATIENT)
Dept: PHARMACY | Facility: CLINIC | Age: 68
End: 2018-07-06

## 2018-07-06 ENCOUNTER — TELEPHONE (OUTPATIENT)
Dept: PHARMACY | Facility: CLINIC | Age: 68
End: 2018-07-06

## 2018-07-06 ENCOUNTER — CARE COORDINATION (OUTPATIENT)
Dept: NEPHROLOGY | Facility: CLINIC | Age: 68
End: 2018-07-06

## 2018-07-06 DIAGNOSIS — E61.1 IRON DEFICIENCY: ICD-10-CM

## 2018-07-06 RX ORDER — FERROUS SULFATE 325(65) MG
TABLET ORAL
Qty: 180 TABLET | Refills: 1 | OUTPATIENT
Start: 2018-07-06

## 2018-07-06 NOTE — TELEPHONE ENCOUNTER
PA Initiation    Medication: ampicillin-sulbactam 3gm sdv  Insurance Company: [employer] - Phone [phone] Fax [phone]  Pharmacy Filling the Rx: [facility]  Filling Pharmacy Phone: [phone]  Filling Pharmacy Fax:    Start Date: 7/6/2018    Central Prior Authorization Team   Phone: [phone]

## 2018-07-06 NOTE — TELEPHONE ENCOUNTER
[facility] PRIOR AUTHORIZATION REQUEST     Drug: ampicillin-sulbactam 3gm sdv  J Code:  NDC: 27966780590  ICD 10 code: M86.171, B95.61    Date(s) of Service: 06/28/2018    Provider: Opal Chen  Provider NPI: [id number]      [facility]  NPI: [id number]

## 2018-07-06 NOTE — PROGRESS NOTES
Reason for Call    Called patient's daughter to check in. Patient has had a lot going on; recently hospitalized with an infected foot ulcer. Is now home and receiving IV antibiotics twice per day.    Reviewed most recent labs. Kidney function has remained stable. Hgb also stable.     Daughter reports that BP has been variable between 120s-150s/70s-80s. Told her that infusion may be contributing to BP changes. Will wait to review with Dr. Basilio at upcoming appointment.     Received a refill request for ferrous sulfate. This was discontinued while patient got a course of IV iron in April and May. Confirmed with patient's daughter that patient is not currently taking ferrous sulfate. Will need to reassess iron labs at upcoming appointment.     Daughter says patient otherwise appears to be doing well with no new symptoms.     Plan    1. Follow up with Dr. Basilio as scheduled on 7/18    Patient was given an opportunity to ask questions and have those questions answered to her satisfaction.  Patient verbalized understanding of instructions provided and agreed to plan of care.    Pravin Cordova, RN, BAN  Nephrology RN Care Coordinator

## 2018-07-06 NOTE — TELEPHONE ENCOUNTER
Ferrous sulfate was discontinued when patient was getting IV iron. Called patient's daughter and confirmed that patient is not currently taking ferrous sulfate. Will re-assess iron levels at patient's upcoming appointment on 7/18.    Pravin Cordova RN

## 2018-07-09 ENCOUNTER — DOCUMENTATION ONLY (OUTPATIENT)
Dept: TRANSPLANT | Facility: CLINIC | Age: 68
End: 2018-07-09

## 2018-07-09 NOTE — PROGRESS NOTES
This is a recent snapshot of the patient's [facility] medical record.  For current drug dose and complete information and questions, call [phone] or In Basket pool, [facility] ([id number])  CSN Number:  [id number]

## 2018-07-09 NOTE — PROGRESS NOTES
This is a recent snapshot of the patient's [facility] medical record.  For current drug dose and complete information and questions, call [phone] or In Basket pool, [facility] ([id number])  CSN Number:  [id number]

## 2018-07-09 NOTE — TELEPHONE ENCOUNTER
Prior Authorization Not Needed per Insurance    Medication: ampicillin-sulbactam 3gm sdv  Insurance Company: [employer] - Phone [phone] Fax [phone]  Pharmacy Filling the Rx: [facility]  Pharmacy Notified: Yes

## 2018-07-10 ENCOUNTER — OFFICE VISIT (OUTPATIENT)
Dept: PULMONOLOGY | Facility: CLINIC | Age: 68
End: 2018-07-10
Attending: INTERNAL MEDICINE
Payer: MEDICARE

## 2018-07-10 ENCOUNTER — TELEPHONE (OUTPATIENT)
Dept: FAMILY MEDICINE | Facility: CLINIC | Age: 68
End: 2018-07-10

## 2018-07-10 ENCOUNTER — ALLIED HEALTH/NURSE VISIT (OUTPATIENT)
Dept: PHARMACY | Facility: CLINIC | Age: 68
End: 2018-07-10
Payer: MEDICAID

## 2018-07-10 ENCOUNTER — MEDICAL CORRESPONDENCE (OUTPATIENT)
Dept: HEALTH INFORMATION MANAGEMENT | Facility: CLINIC | Age: 68
End: 2018-07-10

## 2018-07-10 VITALS
TEMPERATURE: 97.6 F | RESPIRATION RATE: 16 BRPM | OXYGEN SATURATION: 96 % | HEART RATE: 57 BPM | DIASTOLIC BLOOD PRESSURE: 62 MMHG | SYSTOLIC BLOOD PRESSURE: 145 MMHG

## 2018-07-10 VITALS — SYSTOLIC BLOOD PRESSURE: 177 MMHG | OXYGEN SATURATION: 97 % | DIASTOLIC BLOOD PRESSURE: 72 MMHG | HEART RATE: 56 BPM

## 2018-07-10 DIAGNOSIS — I10 HYPERTENSION GOAL BP (BLOOD PRESSURE) < 140/90: ICD-10-CM

## 2018-07-10 DIAGNOSIS — Z79.4 TYPE 2 DIABETES MELLITUS WITH COMPLICATION, WITH LONG-TERM CURRENT USE OF INSULIN (H): Primary | ICD-10-CM

## 2018-07-10 DIAGNOSIS — Z53.9 DIAGNOSIS NOT YET DEFINED: Primary | ICD-10-CM

## 2018-07-10 DIAGNOSIS — R91.8 PULMONARY NODULES: Primary | ICD-10-CM

## 2018-07-10 DIAGNOSIS — E55.9 VITAMIN D DEFICIENCY: ICD-10-CM

## 2018-07-10 DIAGNOSIS — E11.8 TYPE 2 DIABETES MELLITUS WITH COMPLICATION, WITH LONG-TERM CURRENT USE OF INSULIN (H): Primary | ICD-10-CM

## 2018-07-10 LAB
ANION GAP SERPL CALCULATED.3IONS-SCNC: 9 MMOL/L (ref 3–14)
BUN SERPL-MCNC: 66 MG/DL (ref 7–30)
CALCIUM SERPL-MCNC: 8.6 MG/DL (ref 8.5–10.1)
CHLORIDE SERPL-SCNC: 112 MMOL/L (ref 94–109)
CO2 SERPL-SCNC: 24 MMOL/L (ref 20–32)
CREAT SERPL-MCNC: 3.46 MG/DL (ref 0.52–1.04)
CRP SERPL-MCNC: 3.2 MG/L (ref 0–8)
ERYTHROCYTE [DISTWIDTH] IN BLOOD BY AUTOMATED COUNT: 13.1 % (ref 10–15)
GFR SERPL CREATININE-BSD FRML MDRD: 13 ML/MIN/1.7M2
GLUCOSE SERPL-MCNC: 90 MG/DL (ref 70–99)
HCT VFR BLD AUTO: 28.7 % (ref 35–47)
HGB BLD-MCNC: 9.4 G/DL (ref 11.7–15.7)
MCH RBC QN AUTO: 30.3 PG (ref 26.5–33)
MCHC RBC AUTO-ENTMCNC: 32.8 G/DL (ref 31.5–36.5)
MCV RBC AUTO: 93 FL (ref 78–100)
PLATELET # BLD AUTO: 244 10E9/L (ref 150–450)
POTASSIUM SERPL-SCNC: 3.8 MMOL/L (ref 3.4–5.3)
RBC # BLD AUTO: 3.1 10E12/L (ref 3.8–5.2)
SODIUM SERPL-SCNC: 145 MMOL/L (ref 133–144)
WBC # BLD AUTO: 6.3 10E9/L (ref 4–11)

## 2018-07-10 PROCEDURE — G0463 HOSPITAL OUTPT CLINIC VISIT: HCPCS | Mod: ZF

## 2018-07-10 PROCEDURE — 99606 MTMS BY PHARM EST 15 MIN: CPT | Performed by: PHARMACIST

## 2018-07-10 PROCEDURE — 99607 MTMS BY PHARM ADDL 15 MIN: CPT | Performed by: PHARMACIST

## 2018-07-10 PROCEDURE — 86140 C-REACTIVE PROTEIN: CPT | Performed by: FAMILY MEDICINE

## 2018-07-10 PROCEDURE — 80048 BASIC METABOLIC PNL TOTAL CA: CPT | Performed by: FAMILY MEDICINE

## 2018-07-10 PROCEDURE — 85027 COMPLETE CBC AUTOMATED: CPT | Performed by: FAMILY MEDICINE

## 2018-07-10 RX ORDER — CHOLECALCIFEROL (VITAMIN D3) 50 MCG
2000 TABLET ORAL DAILY
Qty: 100 TABLET | Refills: 3 | Status: ON HOLD | OUTPATIENT
Start: 2018-07-10 | End: 2018-09-26

## 2018-07-10 ASSESSMENT — PAIN SCALES - GENERAL: PAINLEVEL: NO PAIN (0)

## 2018-07-10 NOTE — TELEPHONE ENCOUNTER
Called Ramila with [facility] verbal orders given per nursing protocol for OT to evaluate and treat.     Ana Luisa Garcia RN  [facility]

## 2018-07-10 NOTE — MR AVS SNAPSHOT
After Visit Summary   7/10/2018    Supriya Herr    MRN: [id number]           Patient Information     Date Of Birth          1950        Visit Information        Provider Department      7/10/2018 12:00 PM Brenden Blackwell [facility] Medication Therapy Management        Today's Diagnoses     Vitamin D deficiency    -  1      Care Instructions    Recommendations from today's MTM visit:                                                      1. Stop Calcitriol, start Vitamin D 2000 units daily.     2. Increase Carvedilol to 25mg twice daily.    3. Cut back on the snacks between meals. Keep it to only 1-2 snacks daily and keep it less than 30 grams of carbohydrates.     Next MTM visit:     To schedule another MTM appointment, please call the clinic directly or you may call the MTM scheduling line at [phone] or toll-free at [phone].     My Clinical Pharmacist's contact information:                                                      It was a pleasure seeing you today!  Please feel free to contact me with any questions or concerns you have.      Brenden Blackwell, PharmD  [place] Pharmacist    Phone: [phone]     You may receive a survey about the MTM services you received.  I would appreciate your feedback to help me serve you better in the future. Please fill it out and return it when you can. Your comments will be anonymous.              Follow-ups after your visit        Your next 10 appointments already scheduled     Jul 18, 2018  3:15 PM CDT   (Arrive by 2:45 PM)   Return Visit with Kathryn Basilio MD   [facility] ([facility])    [address] [place] [address]  [address]  [facility] [address] [phone]            Jul 31, 2018  6:50 PM CDT   (Arrive by 6:35 PM)   RETURN FOOT/ANKLE with Alvaro Gautam MD   [facility] ([facility])    [address]  4th Floor  [facility] [address]    [phone]            Aug 02, 2018  9:00 AM CDT   (Arrive by 8:45 AM)   Return Visit with Ewelina Chen MD   [facility] ([facility])    [address]  [address]  [facility] [address]   [phone]            Sep 20, 2018  3:00 PM CDT   (Arrive by 2:45 PM)   RETURN DIABETES with Arabella Kamara PA-C   [facility] ([facility])    [address]  3rd Floor  [facility] [address] [phone]            Jan 22, 2019  3:20 PM CST   CT CHEST W/O CONTRAST with UCCT2   [facility] [place] [facility] ([facility])    [address]  1st Floor  [facility] [id number] [phone]           Please bring any scans or X-rays taken at other hospitals, if similar tests were done. Also bring a list of your medicines, including vitamins, minerals and over-the-counter drugs. It is safest to leave personal items at home.  Be sure to tell your doctor:   If you have any allergies.   If there s any chance you are pregnant.   If you are breastfeeding.  You do not need to do anything special to prepare for this exam.  Please wear loose clothing, such as a sweat suit or jogging clothes. Avoid snaps, zippers and other metal. We may ask you to undress and put on a hospital gown.            Jan 22, 2019  4:30 PM CST   (Arrive by 4:15 PM)   Return Visit with Ravin King MD   [facility] ([facility] [place])    [address]  [address]  [facility] [id number] [phone]              Future tests that were ordered for you today     Open Future Orders        Priority Expected Expires Ordered    CT Chest w/o contrast Routine  7/10/2019 7/10/2018            Who to contact     If you have questions or need follow up information about today's clinic visit or your schedule please contact [facility] MEDICATION THERAPY MANAGEMENT  directly at [phone].  Normal or non-critical lab and imaging results will be communicated to you by MyChart, letter or phone within 4 business days after the clinic has received the results. If you do not hear from us within 7 days, please contact the clinic through MyChart or phone. If you have a critical or abnormal lab result, we will notify you by phone as soon as possible.  Submit refill requests through [employer] or call your pharmacy and they will forward the refill request to us. Please allow 3 business days for your refill to be completed.          Additional Information About Your Visit        Care EveryWhere ID     This is your Care EveryWhere ID. This could be used by other organizations to access your [place] medical records  [id number]         Blood Pressure from Last 3 Encounters:   07/10/18 145/62   07/03/18 136/64   06/28/18 177/60    Weight from Last 3 Encounters:   06/25/18 191 lb (86.6 kg)   05/24/18 195 lb 12.8 oz (88.8 kg)   05/21/18 204 lb 3.2 oz (92.6 kg)              Today, you had the following     No orders found for display         Today's Medication Changes          These changes are accurate as of 7/10/18 12:43 PM.  If you have any questions, ask your nurse or doctor.               Start taking these medicines.        Dose/Directions    vitamin D 2000 units tablet   Used for:  Vitamin D deficiency   Started by:  Brenden Blackwell, [facility]        Dose:  2000 Units   Take 2,000 Units by mouth daily   Quantity:  100 tablet   Refills:  3            Where to get your medicines      These medications were sent to [facility]/pharmacy #[id number] - [facility] [address]  [address] [facility] [place] [facility] [address]     Phone:  [phone]     vitamin D 2000 units tablet                Primary Care Provider Office Phone # Fax #    Noam Jackson [phone]       [address] [address]        Equal Access to Services     KALYANI WARREN AH:  Hadii aad indira hoffmanmicaelao Sorocali, waaxda luqadaha, qaybta kaalmada ailin, madeline gusadama chaudhari. So [facility] [phone].    ATENCIÓN: Si carolina ochoa, tiene a santoro disposición servicios gratuitos de asistencia lingüística. Llame al [phone].    We comply with applicable federal civil rights laws and [place] laws. We do not discriminate on the basis of race, color, national origin, age, disability, sex, sexual orientation, or gender identity.            Thank you!     Thank you for choosing [facility] MEDICATION THERAPY MANAGEMENT  for your care. Our goal is always to provide you with excellent care. Hearing back from our patients is one way we can continue to improve our services. Please take a few minutes to complete the written survey that you may receive in the mail after your visit with us. Thank you!             Your Updated Medication List - Protect others around you: Learn how to safely use, store and throw away your medicines at www.disposemymeds.org.          This list is accurate as of 7/10/18 12:43 PM.  Always use your most recent med list.                   Brand Name Dispense Instructions for use Diagnosis    ACE/ARB/ARNI NOT PRESCRIBED (INTENTIONAL)      Please choose reason not prescribed, below    CKD (chronic kidney disease) stage 5, GFR less than 15 ml/min (H)       acetaminophen 325 MG tablet    TYLENOL    100 tablet    Take 2 tablets (650 mg) by mouth every 4 hours as needed for mild pain    Diabetic ulcer of toe of right foot associated with type 2 diabetes mellitus, with other ulcer severity (H)       albuterol 108 (90 Base) MCG/ACT Inhaler    PROAIR HFA/PROVENTIL HFA/VENTOLIN HFA    3 Inhaler    Inhale 2 puffs into the lungs every 6 hours as needed for shortness of breath / dyspnea or wheezing    Cough       ampicillin-sulbactam 3 (2-1) g Solr vial    UNASYN    82 each    Inject 3 g into the vein every 12 hours    Diabetic ulcer of toe of right foot associated with type 2  diabetes mellitus, with other ulcer severity (H)       atorvastatin 10 MG tablet    LIPITOR    30 tablet    Take 1 tablet (10 mg) by mouth daily    Hyperlipidemia LDL goal <100       blood glucose monitoring test strip    ONETOUCH ULTRA    200 strip    Test your blood sugar 3-4 times per day.    Type 2 diabetes mellitus with hyperglycemia, with long-term current use of insulin (H)       calcitRIOL 0.25 MCG capsule    ROCALTROL    30 capsule    TAKE 1 CAPSULE (0.25 MCG) BY MOUTH DAILY    Vitamin D deficiency       carvedilol 12.5 MG tablet    COREG    60 tablet    Take 1 tablet (12.5 mg) by mouth 2 times daily (with meals)    Essential hypertension with goal blood pressure less than 140/90       clindamycin 1 % lotion    CLEOCIN T    60 mL    Apply topically 2 times daily    Intertrigo       furosemide 40 MG tablet    LASIX    30 tablet    Take 1 tablet (40 mg) by mouth 2 times daily    Lymphedema of both lower extremities       insulin glargine 100 UNIT/ML injection    LANTUS    3 mL    Inject 25 Units Subcutaneous At Bedtime    Type 2 diabetes mellitus with diabetic neuropathy, with long-term current use of insulin (H)       insulin pen needle 31G X 6 MM    ULTICARE MINI    100 each    Use daily or as directed.    Type 2 diabetes, HbA1c goal < 7% (H)       NovoLOG FLEXPEN 100 UNIT/ML injection   Generic drug:  insulin aspart     15 mL    Inject 4 units SQ with breakfast, lunch and dinner.    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    Edema of lower extremity       order for DME     1 Device    1 SAD light    Seasonal affective disorder (H)       sertraline 50 MG tablet    ZOLOFT    30 tablet    Take 1/2 tablet (25 mg) for 1-2 weeks, then increase to 1 tablet orally daily    Acute reaction to stress       Urea 20 % Crea cream     85 g    Apply topically daily    Xerosis cutis, Tyloma       vitamin D 2000 units tablet     100 tablet    Take 2,000 Units by mouth daily    Vitamin D deficiency       * Notice:  This list has 3 medication(s) that are the same as other medications prescribed for you. Read the directions carefully, and ask your doctor or other care provider to review them with you.

## 2018-07-10 NOTE — MR AVS SNAPSHOT
After Visit Summary   7/10/2018    Supriya Herr    MRN: [id number]           Patient Information     Date Of Birth          1950        Visit Information        Provider Department      7/10/2018 11:00 AM Ravin King MD [facility]        Today's Diagnoses     Pulmonary nodules    -  1       Follow-ups after your visit        Follow-up notes from your care team     Return in about 6 months (around 1/10/2019).      Your next 10 appointments already scheduled     Jul 10, 2018 12:00 PM CDT   TELEMEDICINE with Brenden Blackwell [facility] ([facility])    [address]  3rd Floor  [facility] [id number] [phone]           Note: this is not an onsite visit; there is no need to come to the facility.            Jul 18, 2018  3:15 PM CDT   (Arrive by 2:45 PM)   Return Visit with Kathryn Basilio MD   [facility]ph[facility] ([facility])    [address]  [address]  [facility] [id number] [phone]            Jul 31, 2018  6:50 PM CDT   (Arrive by 6:35 PM)   RETURN FOOT/ANKLE with Alvaro Gautam MD   [facility] ([facility])    [address] [place] [address]  4th Floor  [facility] [id number] [phone]            Aug 02, 2018  9:00 AM CDT   (Arrive by 8:45 AM)   Return Visit with Ewelina Chen MD   [facility] ([facility])    [address] [place] [address]  [address]  [facility] [id number] [phone]            Sep 20, 2018  3:00 PM CDT   (Arrive by 2:45 PM)   RETURN DIABETES with Arabella Kamara PA-C   [facility]ocrin[facility] ([facility])    [address]  3rd Floor  [facility] [id number] [phone]              Future tests that were ordered for you today     Open Future Orders        Priority Expected  Expires Ordered    CT Chest w/o contrast Routine  7/10/2019 7/10/2018            Who to contact     If you have questions or need follow up information about today's clinic visit or your schedule please contact [facility] directly at [phone].  Normal or non-critical lab and imaging results will be communicated to you by MyChart, letter or phone within 4 business days after the clinic has received the results. If you do not hear from us within 7 days, please contact the clinic through MyChart or phone. If you have a critical or abnormal lab result, we will notify you by phone as soon as possible.  Submit refill requests through [employer] or call your pharmacy and they will forward the refill request to us. Please allow 3 business days for your refill to be completed.          Additional Information About Your Visit        Care EveryWhere ID     This is your Care EveryWhere ID. This could be used by other organizations to access your [facility] medical records  [id number]        Your Vitals Were     Pulse Temperature Respirations Pulse Oximetry          57 97.6  F (36.4  C) (Oral) 16 96%         Blood Pressure from Last 3 Encounters:   07/10/18 145/62   07/03/18 136/64   06/28/18 177/60    Weight from Last 3 Encounters:   06/25/18 86.6 kg (191 lb)   05/24/18 88.8 kg (195 lb 12.8 oz)   05/21/18 92.6 kg (204 lb 3.2 oz)               Primary Care Provider Office Phone # Fax #    Noam Luis Jackson [phone]       [address] [address]        Equal Access to Services     [facility]: Hadii aad ku hadasho Soomaali, waaxda luqadaha, qaybta kaalmada madeline parisi . So [facility] [phone].    ATENCIÓN: Si habla español, tiene a santoro disposición servicios gratuitos de asistencia lingüística. Llame al [phone].    We comply with applicable federal civil rights laws and [place] laws. We do not discriminate on the  basis of race, color, national origin, age, disability, sex, sexual orientation, or gender identity.            Thank you!     Thank you for choosing [facility]  for your care. Our goal is always to provide you with excellent care. Hearing back from our patients is one way we can continue to improve our services. Please take a few minutes to complete the written survey that you may receive in the mail after your visit with us. Thank you!             Your Updated Medication List - Protect others around you: Learn how to safely use, store and throw away your medicines at www.disposemymeds.org.          This list is accurate as of 7/10/18 11:25 AM.  Always use your most recent med list.                   Brand Name Dispense Instructions for use Diagnosis    ACE/ARB/ARNI NOT PRESCRIBED (INTENTIONAL)      Please choose reason not prescribed, below    CKD (chronic kidney disease) stage 5, GFR less than 15 ml/min (H)       acetaminophen 325 MG tablet    TYLENOL    100 tablet    Take 2 tablets (650 mg) by mouth every 4 hours as needed for mild pain    Diabetic ulcer of toe of right foot associated with type 2 diabetes mellitus, with other ulcer severity (H)       albuterol 108 (90 Base) MCG/ACT Inhaler    PROAIR HFA/PROVENTIL HFA/VENTOLIN HFA    3 Inhaler    Inhale 2 puffs into the lungs every 6 hours as needed for shortness of breath / dyspnea or wheezing    Cough       ampicillin-sulbactam 3 (2-1) g Solr vial    UNASYN    82 each    Inject 3 g into the vein every 12 hours    Diabetic ulcer of toe of right foot associated with type 2 diabetes mellitus, with other ulcer severity (H)       atorvastatin 10 MG tablet    LIPITOR    30 tablet    Take 1 tablet (10 mg) by mouth daily    Hyperlipidemia LDL goal <100       blood glucose monitoring test strip    ONETOUCH ULTRA    200 strip    Test your blood sugar 3-4 times per day.    Type 2 diabetes mellitus with hyperglycemia, with long-term current use of  insulin (H)       calcitRIOL 0.25 MCG capsule    ROCALTROL    30 capsule    TAKE 1 CAPSULE (0.25 MCG) BY MOUTH DAILY    Vitamin D deficiency       carvedilol 12.5 MG tablet    COREG    60 tablet    Take 1 tablet (12.5 mg) by mouth 2 times daily (with meals)    Essential hypertension with goal blood pressure less than 140/90       clindamycin 1 % lotion    CLEOCIN T    60 mL    Apply topically 2 times daily    Intertrigo       furosemide 40 MG tablet    LASIX    30 tablet    Take 1 tablet (40 mg) by mouth 2 times daily    Lymphedema of both lower extremities       insulin glargine 100 UNIT/ML injection    LANTUS    3 mL    Inject 25 Units Subcutaneous At Bedtime    Type 2 diabetes mellitus with diabetic neuropathy, with long-term current use of insulin (H)       insulin pen needle 31G X 6 MM    ULTICARE MINI    100 each    Use daily or as directed.    Type 2 diabetes, HbA1c goal < 7% (H)       NovoLOG FLEXPEN 100 UNIT/ML injection   Generic drug:  insulin aspart     15 mL    Inject 4 units SQ with breakfast, lunch and dinner.    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    Edema of lower extremity       order for DME     1 Device    1 SAD light    Seasonal affective disorder (H)       sertraline 50 MG tablet    ZOLOFT    30 tablet    Take 1/2 tablet (25 mg) for 1-2 weeks, then increase to 1 tablet orally daily    Acute reaction to stress       Urea 20 % Crea cream     85 g    Apply topically daily    Xerosis cutis, Tyloma       * Notice:  This list has 3 medication(s) that are the same as other medications prescribed for you. Read the directions carefully, and ask your doctor or other care provider to review them with you.

## 2018-07-10 NOTE — NURSING NOTE
"Oncology Rooming Note    July 10, 2018 10:59 AM   Supriya Herr is a 68 year old female who presents for:    Chief Complaint   Patient presents with     Oncology Clinic Visit     New patient; Lung Nodule     Initial Vitals: /62  Pulse 57  Temp 97.6  F (36.4  C) (Oral)  Resp 16  SpO2 96% Estimated body mass index is 31.78 kg/(m^2) as calculated from the following:    Height as of 5/24/18: 1.651 m (5' 5\").    Weight as of 6/25/18: 86.6 kg (191 lb). There is no height or weight on file to calculate BSA.  No Pain (0) Comment: Data Unavailable   No LMP recorded. Patient is postmenopausal.  Allergies reviewed: Yes  Medications reviewed: Yes    Medications: Medication refills not needed today.  Pharmacy name entered into [employer]:    [facility]  [id number] - [address]    Clinical concerns: new lung nodule     6 minutes for nursing intake (face to face time)     Arabella Clark CMA              "

## 2018-07-10 NOTE — PROGRESS NOTES
Arabella Kamara PA-C [place], Peter Alberto [facility]                     Rosendo Wilcox:   That would be helpful.   Thank you.   Arabella Kamara PA-C            Previous Messages       ----- Message -----      From: Brenden Blackwell RPH      Sent: 7/2/2018   3:59 PM        To: ILA German PA,     I saw your patient for a medication review post hospitalization. Unfortunately, she missed her appt with you due to this. I recommended she reschedule ASAP as her BG have been fluctuating wildly (although fasting has been under control per patient's daughter).     She mentioned that she might not be able to get into see you until September. If you like I can go through her blood sugars with her to bridge the gap.     Let me know if you want me to reach out to her later this month! Thanks!     Brenden Blackwell, PharmD   [facility] Pharmacist     Phone: [phone]
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
## 2018-07-10 NOTE — TELEPHONE ENCOUNTER
Reason for Call:  Home Health Care    Ramila with [facility] called regarding (reason for call): extending orders    Orders are needed for this patient.     PT:     OT: Eval and treat - were not able to meet with the patient before the old orders .    Skilled Nursing:     Pt Provider: Dr. Jackson    Phone Number Homecare Nurse can be reached at: [phone]    Can we leave a detailed message on this number? YES    Phone number patient can be reached at: Home number on file [phone] (home)    Best Time: Any    Call taken on 7/10/2018 at 8:36 AM by Mariam Wasserman

## 2018-07-10 NOTE — PROGRESS NOTES
Kathryn Basilio MD Griffin, Peter Alberto, [facility]                     I am fine with cholecalciferol instead. I think we have protocols and will look to see what they say, but agree it is good to use pairs, until PTH more elevated   thanks            Previous Messages       ----- Message -----      From: Brenden Blackwell, [facility]      Sent: 7/2/2018   4:04 PM        To: MD Dr. Chetna Lambert,     I saw your patient for a medication review post hospitalization for osteomyelitis. She is ESRD near dialysis and I had a question about her Calcitriol. Do you think Vitamin D3 be more appropriate? Her PTH has never been grossly elevated, Calcium WNL, phosphates mildly elevated. Per KDIGO 2017 update, they don't necessarily recommend treating Hyperparathyroidism with Calcitriol before dialysis.     I just wanted to make sure I wasn't missing something. Thanks Dr. Basilio!     Brenden Blackwell, PharmD   [place] Pharmacist     Phone: [phone]
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
## 2018-07-10 NOTE — PROGRESS NOTES
Noam Jackson MD Griffin, Peter Alberto [facility]                     soungs good, go ahead and make changes            Previous Messages       ----- Message -----      From: Brenden Blackwell RPH      Sent: 7/2/2018   3:57 PM        To: MD Dr. Renetta Araiza,   I saw your patient for a medication review post hospitalization. Her blood pressure has remained elevated over the last several visits and has been fluctuating at home.     Here are my recommendations:   1. Increasing Carvedilol to 25mg BID. Pt BP has not been at goal <130/80.   2. Once pt is more stable, could consider increase Atorvastatin to high intensity dose, due to high ASCVD risk score.     Thank you, let me know what you think!     Brenden Blackwell PharmD   [facility] Pharmacist     Phone: [phone]              FYI holding on these changes. Statin change not priority, and patent's pulse is ~56. I would like to avoid further suppressing it at this time with Carvedilol dose increase. Consulting nephrology on options.     Brenden Blackwell PharmD  [facility] Pharmacist    Phone: [phone]

## 2018-07-10 NOTE — PROGRESS NOTES
SUBJECTIVE/OBJECTIVE:                Supriya Herr is a 68 year old female called for a follow up to a transitions of care visit.  She was discharged from [facility] on 6/28/18 for Osteomyelitis.  Went in because her foot was bright red. Her daughter Caroline came with her today.     Chief Complaint: Follow up from last visit: check BG and replace Calcitriol with Vitamin D3 2000 units daily.     Allergies/ADRs: None  Tobacco: 0-1 pack per day - is not interested in quitting sneaks in a cigarette in here and there, but unable to smoke where she is staying.   Alcohol: not currently using  PMH: Reviewed in Epic    Medication Adherence/Access:  Patient uses pill box(es) and has assistance with medication administration: family member, daughter.  Patient takes medications 2 time(s) per day.  Per patient, misses medication 1 times per week. Missed 1 evening in the past week. Been pretty consistent 95%.     CKD: Pt is taking Calcitriol 0.25mg daily. PTH WNL. Phos elevated. Dr. Chetna isaacsed D/C of Calcitriol at last visit.     Hypertension: Current medications include Carvedilol 12.5mg BID, Furosemide 40mg BID (does not weigh at home due to being in wheel chair), a little lymphoedema in leg, possibly due to infection.  Patient does self-monitor BP - 140-150 at home, lowest has been 130.  Patient reports no current medication side effects. Does have some peripheral edema.     Diabetes: Pt currently taking Lantus 25 units daily, Novolog 4 units with meals. Pt is not experiencing side effects. Caroline believes she does not miss doses of insulin, but is unsure.   SMBG: three times daily.   Ranges (patient reported): had a 400 on Saturday. Supposed to see Twila Kamara, but missed appt due to hospitalizations.   Date FBG/ 2hours post Lunch/2hours post Dinner /2hours post Bedtime   7/9 109      7/8 101 130 218    7/7 124 276 294    7/6 77 120 96    7/5  101 96    7/4 128 128 353 182   7/3 84  137    7/2 109 110 288    7/1 128 114 235 126    6/30 112  243    6/29 100   195   6/28 116 136 436      FBG: Today: 112, usually really good in the morning.   During the day can spike up. Had a 500 before hospitalization.   Patient is not experiencing hypoglycemia since being home.   Recent symptoms of high blood sugar? none  Eye exam: due. Last visit was 05/2017  Foot exam: up to date  Microalbumin is < 30 mg/g. Pt is not taking an ACEi/ARB.  Aspirin: pt not taking  Diet/Exercise: Discussed with patient and daughter. Pt does have a penchant for sweets and snacking between meals. This may be contributing to her high dinner numbers.    Current labs include:  BP Readings from Last 3 Encounters:   07/10/18 145/62   07/03/18 136/64   06/28/18 177/60     Today's Vitals: /72  Pulse 56  SpO2 97%  Lab Results   Component Value Date    A1C 6.0 06/22/2018   .  Lab Results   Component Value Date    CHOL 179 03/29/2018     Lab Results   Component Value Date    TRIG 131 03/29/2018     Lab Results   Component Value Date    HDL 45 03/29/2018     Lab Results   Component Value Date     03/29/2018       Liver Function Studies -   Recent Labs   Lab Test  06/24/18   0623   PROTTOTAL  6.2*   ALBUMIN  2.3*   BILITOTAL  0.2   ALKPHOS  49   AST  16   ALT  17       Lab Results   Component Value Date    UCRR 40 05/24/2018    MICROL 2880 01/09/2018    UMALCR 6037.74 (H) 01/09/2018       Last Basic Metabolic Panel:  Lab Results   Component Value Date     06/28/2018      Lab Results   Component Value Date    POTASSIUM 3.8 06/28/2018     Lab Results   Component Value Date    CHLORIDE 111 06/28/2018     Lab Results   Component Value Date    BUN 77 06/28/2018     Lab Results   Component Value Date    CR 3.52 06/28/2018     GFR Estimate   Date Value Ref Range Status   07/03/2018 13 (L) >60 mL/min/1.7m2 Final     Comment:     Non  GFR Calc   06/28/2018 13 (L) >60 mL/min/1.7m2 Final     Comment:     Non  GFR Calc   06/27/2018 12 (L) >60  mL/min/1.7m2 Final     Comment:     Non  GFR Calc     Most Recent Immunizations   Administered Date(s) Administered     HepB-Adult 05/18/2018     Influenza (High Dose) 3 valent vaccine 09/21/2017     Influenza (IIV3) PF 10/25/2006     Influenza Vaccine IM 3yrs+ 4 Valent IIV4 01/21/2016     Influenza Vaccine, 3 YRS +, IM (QUADRIVALENT W/PRESERVATIVES) 11/21/2016     Pneumo Conj 13-V (2010&after) 08/13/2015     Pneumococcal 23 valent 08/27/2012     TD (ADULT, 7+) 02/08/2006     TDAP Vaccine (Adacel) 07/18/2013     Zoster vaccine, live 08/13/2015     ASSESSMENT:                 Current medications were reviewed today.      Medication Adherence: good, no issues identified    CKD: Stable. Pt to stop Calcitriol and start Vitamin D as okayed by Dr. Basilio.     Hypertension: Needs improvement. BP not at goal <130/80. Originally wanted to increase Carvedilol but pt's pulse is between 56-58 today, and I would like to avoid further depressing it. Cross it off of her AVS. She has been on Lisinopril/ Spironolactone in the past, but these were stopped due to Hyperkalemia. Will discuss the possibility of restarting Lisinopril with Nephrology. Otherwise Amlodipine and Hydralazine are options. Would have to monitor for edema after start.     Diabetes: For the most part patients BG have been well controlled. Majority of highs are occurring at dinner time. Daughter and pt agree this may have something to do with her grazing between meals. Pt was instructed to limit herself to 1-2 snacks daily with <30 grams of carbs. Endocrinology could consider increasing her lunch time dose of Novolog as well. Will discuss with endo.     PLAN:                Dr. Basilio to consider:   1. Starting ACE-I/ ARB, monitor for hyperkalemia. Alternatives to consider Amlodipine and Hydralazine.     Pt to...  1. Stop Calcitriol, start vitamin D 2000 units daily.     I spent 60 minutes with this patient today. I offer these suggestions for  consideration by the PCP. A copy of the visit note was provided to the patient's primary care provider.    Will follow up as needed.    The patient was mailed a summary of these recommendations as an after visit summary.    Sydni RodriguezD  [facility] Pharmacist    Phone: [phone]

## 2018-07-10 NOTE — PATIENT INSTRUCTIONS
Recommendations from today's MTM visit:                                                      1. Stop Calcitriol, start Vitamin D 2000 units daily.     2.. Cut back on the snacks between meals. Keep it to only 1-2 snacks daily and keep it less than 30 grams of carbohydrates.     Next MTM visit:     To schedule another MTM appointment, please call the clinic directly or you may call the MTM scheduling line at [phone] or toll-free at [phone].     My Clinical Pharmacist's contact information:                                                      It was a pleasure seeing you today!  Please feel free to contact me with any questions or concerns you have.      Brenden Blackwell, PharmD  [facility] Pharmacist    Phone: [phone]     You may receive a survey about the MTM services you received.  I would appreciate your feedback to help me serve you better in the future. Please fill it out and return it when you can. Your comments will be anonymous.

## 2018-07-11 ENCOUNTER — DOCUMENTATION ONLY (OUTPATIENT)
Dept: CARE COORDINATION | Facility: CLINIC | Age: 68
End: 2018-07-11

## 2018-07-11 ENCOUNTER — HOME INFUSION (PRE-WILLOW HOME INFUSION) (OUTPATIENT)
Dept: PHARMACY | Facility: CLINIC | Age: 68
End: 2018-07-11

## 2018-07-11 DIAGNOSIS — N18.5 CKD (CHRONIC KIDNEY DISEASE) STAGE 5, GFR LESS THAN 15 ML/MIN (H): Primary | ICD-10-CM

## 2018-07-11 NOTE — PROGRESS NOTES
[facility] now requests orders and shares plan of care/discharge summaries for some patients through [facility].  Please REPLY TO THIS MESSAGE OR ROUTE BACK TO THE AUTHOR in order to give authorization for orders when needed.  This is considered a verbal order, you will still receive a faxed copy of orders for signature.  Thank you for your assistance in improving collaboration for our patients.    ORDER  PT nancy completed 7/10/18. Continue PT 1 x wk x 4 weeks     MD SUMMARY/PLAN OF CARE Pt presents with LAK amputee, R LE diabetic wound, w/c dependence, poor safety awareness and poor technique with transfers, impaired balance, and limited kamar to activity, impacting her safety and mobility in home and community. Pt would benefit from skilled home Physical Therapy to  include transfer training with focus on decreasing WB on wound on R foot, therapeutic activities, establish and progress Home Exercise Program addressing LE balance, strength and endurance.     Goals  During course of therapy visits, patient will be able to verbalize/demonstrate pain control techniques to allow for full participation in the rehabilitation process.  Patient will demponstrate independent ability to complete sit/stand transition from w/chair to bed and back with minimal WB on R forefoot.  Patient will demponstrate independent ability to complete sit/stand transition from w/chair to toilet and back with minimal WB on R forefoot..  At discharge, patient will demonstrate consistant performance of a home exercise routine for continued balance, strength and endurance gains    [place] Driver Physical Therapist  [facility]  [address] [place] [id number]  [email or web address]  [email or web address]   Office: [phone] Cell:[phone]

## 2018-07-11 NOTE — PROGRESS NOTES
Arabella Kamara PA-C [place], Peter [facility]to [facility]                     Great.   Thank you.   Rose kamara            Previous Messages       ----- Message -----      From: Brenden Blackwell RPH      Sent: 7/11/2018   9:31 AM        To: Arabella Kamara PA-C     I'll have her start with 2 units with her snack. THanks for the quick response!   ----- Message -----      From: Arabella Kamara PA-C      Sent: 7/11/2018   8:51 AM        To: Brenden Blackwell [facility]     Can you have her cover her snack in the afternoon?   Thanks rose kamara     ----- Message -----      From: Brenden Blackwell RPH      Sent: 7/10/2018   4:22 PM        To: ILA German Arabella,     Saw patient on 7/10 to more fully evaluate her blood sugars. Breakfast and Lunch have been very well controlled, but looks like her dinnertime numbers have been running between . She attributes this to grazing (does not believe she missed any doses of insulin). She is going to cut back on her carb intake (1-2 snacks daily <30 g carbs).     Could consider a small increase of her lunchtime insulin.     Let me know if you want me to make any changes for her. Thanks. See note on 7/10 for better formatted BG chart.       Date FBG Lunch Dinner Bedtime   7/9 109   7/8 101 130 218   7/7 124 276 294   7/6 77 120 96   7/5 101 96   7/4 128 128 353 182   7/3 84 137   7/2 109 110 288   7/1 128 114 235 126   6/30 112 243   6/29 100 195   6/28 116 136 436              Spoke with Caroline, her daughter. She will have Supriya give 2 units of Novolog to cover a single snack between Lunch and Dinner. If she has multiple snacks she was instructed to do this only once to avoid over dosing.     Brenden Blackwell, PharmD  [facility] Pharmacist    Phone: [phone]

## 2018-07-12 ENCOUNTER — HOSPITAL ENCOUNTER (EMERGENCY)
Facility: CLINIC | Age: 68
Discharge: HOME | End: 2018-07-12
Attending: EMERGENCY MEDICINE | Admitting: EMERGENCY MEDICINE
Payer: MEDICARE

## 2018-07-12 ENCOUNTER — TELEPHONE (OUTPATIENT)
Dept: FAMILY MEDICINE | Facility: CLINIC | Age: 68
End: 2018-07-12

## 2018-07-12 ENCOUNTER — TELEPHONE (OUTPATIENT)
Dept: INFECTIOUS DISEASES | Facility: CLINIC | Age: 68
End: 2018-07-12

## 2018-07-12 ENCOUNTER — HOME INFUSION (PRE-WILLOW HOME INFUSION) (OUTPATIENT)
Dept: PHARMACY | Facility: CLINIC | Age: 68
End: 2018-07-12

## 2018-07-12 VITALS
OXYGEN SATURATION: 100 % | RESPIRATION RATE: 18 BRPM | WEIGHT: 192.24 LBS | HEART RATE: 59 BPM | SYSTOLIC BLOOD PRESSURE: 174 MMHG | BODY MASS INDEX: 31.99 KG/M2 | DIASTOLIC BLOOD PRESSURE: 72 MMHG | TEMPERATURE: 98.2 F

## 2018-07-12 DIAGNOSIS — T50.905A ADVERSE EFFECT OF DRUG, INITIAL ENCOUNTER: ICD-10-CM

## 2018-07-12 LAB
BACTERIA SPEC CULT: ABNORMAL
SPECIMEN SOURCE: ABNORMAL

## 2018-07-12 PROCEDURE — 96365 THER/PROPH/DIAG IV INF INIT: CPT | Performed by: EMERGENCY MEDICINE

## 2018-07-12 PROCEDURE — 99284 EMERGENCY DEPT VISIT MOD MDM: CPT | Mod: Z6 | Performed by: EMERGENCY MEDICINE

## 2018-07-12 PROCEDURE — 25000128 H RX IP 250 OP 636: Performed by: EMERGENCY MEDICINE

## 2018-07-12 PROCEDURE — 99284 EMERGENCY DEPT VISIT MOD MDM: CPT | Mod: 25 | Performed by: EMERGENCY MEDICINE

## 2018-07-12 RX ADMIN — SODIUM CHLORIDE 500 MG: 9 INJECTION, SOLUTION INTRAVENOUS at 12:37

## 2018-07-12 ASSESSMENT — ENCOUNTER SYMPTOMS
EYE REDNESS: 0
FEVER: 0
DIFFICULTY URINATING: 0
ABDOMINAL PAIN: 0
CONFUSION: 0
SHORTNESS OF BREATH: 0
ARTHRALGIAS: 0
COLOR CHANGE: 0
NECK STIFFNESS: 0
FATIGUE: 1
HEADACHES: 0

## 2018-07-12 NOTE — DISCHARGE INSTRUCTIONS
Please make an appointment to follow up with ID, Dr. Chen. .    She will call in a prescription for deliveries of Ertapenem 500 mg once a day starting tomorrow.  You will get the first dose here.    You can take benadryl for itching.    Return if trouble breathing, fever or feeling worse.

## 2018-07-12 NOTE — PROGRESS NOTES
This is a recent snapshot of the patient's [facility] medical record.  For current drug dose and complete information and questions, call [phone] or In Basket pool, [facility] home infusion ([id number])  CSN Number:  [id number]

## 2018-07-12 NOTE — TELEPHONE ENCOUNTER
7/12/2018   Infectious Diseases Telephone Note:     Was paged by ED today because Ms. Herr has a drug rash likely secondary to Unasyn. Would like to keep her on a beta lactam based on her organisms (staph, strep). Ceftriaxone would have fairly low cross-reactivity but may be difficult to assess tolerance in setting of evolving existing drug rash. Therefore, will switch to ertapenem, renally dosed. First dose in ED.     I have spoken with [facility] and they will switch her home deliveries to ertapenem. They will calculate renal dosing. End date remains 8/4/18.     Ewelina Chen MD  Infectious Diseases  [phone]
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
## 2018-07-12 NOTE — TELEPHONE ENCOUNTER
Called Ramila with [facility], verbal orders provided per protocol.     Carla Field BSN RN  [facility]

## 2018-07-12 NOTE — ED AVS SNAPSHOT
[facility], [place], Emergency Department    [address] [place] [address]    Phone:  [phone]                                       Supriya Herr   MRN: [id number]    Department:  [facility], Emergency Department   Date of Visit:  7/12/2018           After Visit Summary Signature Page     I have received my discharge instructions, and my questions have been answered. I have discussed any challenges I see with this plan with the nurse or doctor.    ..........................................................................................................................................  Patient/Patient Representative Signature      ..........................................................................................................................................  Patient Representative Print Name and Relationship to Patient    ..................................................               ................................................  Date                                            Time    ..........................................................................................................................................  Reviewed by Signature/Title    ...................................................              ..............................................  Date                                                            Time

## 2018-07-12 NOTE — ED AVS SNAPSHOT
[facility], Emergency Department    [address] [address]    Phone:  [phone]                                       Supriya Herr   MRN: [id number]    Department:  [facility], Emergency Department   Date of Visit:  7/12/2018           Patient Information     Date Of Birth          1950        Your diagnoses for this visit were:     Adverse effect of drug, initial encounter        You were seen by Stanton White MD.        Discharge Instructions       Please make an appointment to follow up with ID, Dr. Chen. .    She will call in a prescription for deliveries of Ertapenem 500 mg once a day starting tomorrow.  You will get the first dose here.    You can take benadryl for itching.    Return if trouble breathing, fever or feeling worse.      Your next 10 appointments already scheduled     Jul 18, 2018  2:15 PM CDT   Lab with  LAB   [facility] ([facility])    [address]  1st Floor  [facility] [id number] [phone]            Jul 18, 2018  3:15 PM CDT   (Arrive by 2:45 PM)   Return Visit with Kathryn Basilio MD   [facility] Nephrology ([facility])    [address]  [address]  [facility] [id number] [phone]            Jul 31, 2018  6:50 PM CDT   (Arrive by 6:35 PM)   RETURN FOOT/ANKLE with Alvaro Gautam MD   [facility] ([facility])    [address]  4th Floor  [facility] [id number] [phone]            Aug 02, 2018  9:00 AM CDT   (Arrive by 8:45 AM)   Return Visit with Ewelina Chen MD   [facility] Infectious Diseases ([facility])    [address]  [address]  [facility] [id number] [phone]            Sep 20, 2018  3:00 PM CDT   (Arrive by 2:45 PM)   RETURN DIABETES with Arabella Kamara PA-C   [facility] Endocrinology ([facility])    [address]  3rd Floor  [facility] [id number] [phone]            Jan 22, 2019  3:20 PM CST   CT CHEST W/O CONTRAST with UCCT2   [facility] ([facility])    [address]  1st Floor  [facility] [id number] [phone]           Please bring any scans or X-rays taken at other hospitals, if similar tests were done. Also bring a list of your medicines, including vitamins, minerals and over-the-counter drugs. It is safest to leave personal items at home.  Be sure to tell your doctor:   If you have any allergies.   If there s any chance you are pregnant.   If you are breastfeeding.  You do not need to do anything special to prepare for this exam.  Please wear loose clothing, such as a sweat suit or jogging clothes. Avoid snaps, zippers and other metal. We may ask you to undress and put on a hospital gown.            Jan 22, 2019  4:30 PM CST   (Arrive by 4:15 PM)   Return Visit with Ravin King MD   [facility] ([facility])    [address] [place] [address]  [address]  [facility] [id number] [phone]              24 Hour Appointment Hotline       To make an appointment at any [facility], call [phone] ([phone]). If you don't have a family doctor or clinic, we will help you find one. [facility] clinics are conveniently located to serve the needs of you and your family.             Review of your medicines      Our records show that you are taking the medicines listed below. If these are incorrect, please call your family doctor or clinic.        Dose / Directions Last dose taken    ACE/ARB/ARNI NOT PRESCRIBED (INTENTIONAL)        Please choose reason not prescribed, below   Refills:  0        acetaminophen 325 MG tablet   Commonly known as:  TYLENOL   Dose:  650 mg   Quantity:  100 tablet        Take 2 tablets (650 mg) by mouth every 4 hours as needed for mild pain   Refills:  0         albuterol 108 (90 Base) MCG/ACT Inhaler   Commonly known as:  PROAIR HFA/PROVENTIL HFA/VENTOLIN HFA   Dose:  2 puff   Quantity:  3 Inhaler        Inhale 2 puffs into the lungs every 6 hours as needed for shortness of breath / dyspnea or wheezing   Refills:  1        ampicillin-sulbactam 3 (2-1) g Solr vial   Commonly known as:  UNASYN   Dose:  3 g   Indication:  diabetic foot infection   Quantity:  82 each        Inject 3 g into the vein every 12 hours   Refills:  0        atorvastatin 10 MG tablet   Commonly known as:  LIPITOR   Dose:  10 mg   Quantity:  30 tablet        Take 1 tablet (10 mg) by mouth daily   Refills:  0        blood glucose monitoring test strip   Commonly known as:  ONETOUCH ULTRA   Quantity:  200 strip        Test your blood sugar 3-4 times per day.   Refills:  3        calcitRIOL 0.25 MCG capsule   Commonly known as:  ROCALTROL   Quantity:  30 capsule        TAKE 1 CAPSULE (0.25 MCG) BY MOUTH DAILY   Refills:  3        carvedilol 12.5 MG tablet   Commonly known as:  COREG   Dose:  12.5 mg   Quantity:  60 tablet        Take 1 tablet (12.5 mg) by mouth 2 times daily (with meals)   Refills:  11        clindamycin 1 % lotion   Commonly known as:  CLEOCIN T   Quantity:  60 mL        Apply topically 2 times daily   Refills:  3        furosemide 40 MG tablet   Commonly known as:  LASIX   Dose:  40 mg   Quantity:  30 tablet        Take 1 tablet (40 mg) by mouth 2 times daily   Refills:  11        insulin glargine 100 UNIT/ML injection   Commonly known as:  LANTUS   Dose:  25 Units   Quantity:  3 mL        Inject 25 Units Subcutaneous At Bedtime   Refills:  1        insulin pen needle 31G X 6 MM   Commonly known as:  ULTICARE MINI   Quantity:  100 each        Use daily or as directed.   Refills:  prn        NovoLOG FLEXPEN 100 UNIT/ML injection   Quantity:  15 mL   Generic drug:  insulin aspart        Inject 4 units SQ with breakfast, lunch and dinner.   Refills:  3        order for DME    Quantity:  1 Device        Equipment being ordered: Compression stockings, 20-30 MMHG, knee high   Refills:  0        * order for DME   Quantity:  2 Device        Equipment being ordered: TEDS stocking  Below the knee 15-20 mg Dispense 2 Use daily   Refills:  1        * order for DME   Quantity:  1 Device        1 wheelchair   Refills:  0        * order for DME   Quantity:  1 Units        Equipment being ordered: Right Lower extremity Solaris Ready wrap calf piece:  Size small/length tall , knee piece: Size small , Thigh piece size small/length average.   Refills:  0        order for DME   Quantity:  1 Device        1 SAD light   Refills:  1        sertraline 50 MG tablet   Commonly known as:  ZOLOFT   Quantity:  30 tablet        Take 1/2 tablet (25 mg) for 1-2 weeks, then increase to 1 tablet orally daily   Refills:  0        Urea 20 % Crea cream   Quantity:  85 g        Apply topically daily   Refills:  3        vitamin D 2000 units tablet   Dose:  2000 Units   Quantity:  100 tablet        Take 2,000 Units by mouth daily   Refills:  3        * Notice:  This list has 3 medication(s) that are the same as other medications prescribed for you. Read the directions carefully, and ask your doctor or other care provider to review them with you.            Orders Needing Specimen Collection     None      Pending Results     No orders found from 7/10/2018 to 7/13/2018.            Pending Culture Results     No orders found from 7/10/2018 to 7/13/2018.            Pending Results Instructions     If you had any lab results that were not finalized at the time of your Discharge, you can call the ED Lab Result RN at [phone]. You will be contacted by this team for any positive Lab results or changes in treatment. The nurses are available 7 days a week from 10A to 6:30P.  You can leave a message 24 hours per day and they will return your call.        Thank you for choosing [facility]       Thank you for choosing [facility] for  your care. Our goal is always to provide you with excellent care. Hearing back from our patients is one way we can continue to improve our services. Please take a few minutes to complete the written survey that you may receive in the mail after you visit with us. Thank you!        Care EveryWhere ID     This is your Care EveryWhere ID. This could be used by other organizations to access your [facility] medical records  [id number]        Equal Access to Services     KALYANI WARREN : Emeterio Lam, kristin crocker, karina parisi, madeline chaudhari. So [facility] [phone].    ATENCIÓN: Si habla español, tiene a santoro disposición servicios gratuitos de asistencia lingüística. Renuka al [phone].    We comply with applicable federal civil rights laws and [place] laws. We do not discriminate on the basis of race, color, national origin, age, disability, sex, sexual orientation, or gender identity.            After Visit Summary       This is your record. Keep this with you and show to your community pharmacist(s) and doctor(s) at your next visit.

## 2018-07-12 NOTE — TELEPHONE ENCOUNTER
Reason for call:  Order   Order or referral being requested: Occupational Therapy Orders  Reason for request: NA  Date needed: as soon as possible  Has the patient been seen by the PCP for this problem? YES    Additional comments: Ramila with [facility] called to get verbal orders from Dr. Jackson for occupational therapy for times a month for one month for ADLs and DME.    Phone number to reach patient:  Other phone number: [phone]    Best Time: Any    Can we leave a detailed message on this number?  YES

## 2018-07-12 NOTE — ED TRIAGE NOTES
Pt presents ambulatory to triage from home with son in law. Pt states for past 2 days has noticed rashes throughout her body primarily at her arms. Pt is receiving home infusions of abx to tx infection of right foot. Pt hx diabetes and is below the knee amputation. Pt denies SOB.

## 2018-07-13 ENCOUNTER — TELEPHONE (OUTPATIENT)
Dept: INFECTIOUS DISEASES | Facility: CLINIC | Age: 68
End: 2018-07-13

## 2018-07-13 DIAGNOSIS — E11.9 TYPE 2 DIABETES, HBA1C GOAL < 7% (H): ICD-10-CM

## 2018-07-13 NOTE — TELEPHONE ENCOUNTER
Prescription approved per [facility] Refill Protocol.    Viridiana Sprague RN   [facility]

## 2018-07-13 NOTE — TELEPHONE ENCOUNTER
[facility]    Phone Message    May a detailed message be left on voicemail: yes    Reason for Call: Other: pt daughter calling to get her mom in for a F/u hospital EB - rash on whole body- itchy and Red  pt discharge paperwork said to follow up in the clinic. Pt is scheduled for Aug 2 and she would need to be seen sooner.    Action Taken: Message routed to:  [facility] ([facility]): ID

## 2018-07-14 ENCOUNTER — HOSPITAL ENCOUNTER (EMERGENCY)
Facility: CLINIC | Age: 68
Discharge: HOME | End: 2018-07-14
Attending: EMERGENCY MEDICINE | Admitting: EMERGENCY MEDICINE
Payer: MEDICARE

## 2018-07-14 ENCOUNTER — HOME INFUSION (PRE-WILLOW HOME INFUSION) (OUTPATIENT)
Dept: PHARMACY | Facility: CLINIC | Age: 68
End: 2018-07-14

## 2018-07-14 VITALS
WEIGHT: 191 LBS | RESPIRATION RATE: 14 BRPM | DIASTOLIC BLOOD PRESSURE: 78 MMHG | HEIGHT: 66 IN | TEMPERATURE: 98.3 F | BODY MASS INDEX: 30.7 KG/M2 | OXYGEN SATURATION: 98 % | SYSTOLIC BLOOD PRESSURE: 138 MMHG | HEART RATE: 98 BPM

## 2018-07-14 DIAGNOSIS — T50.905A ADVERSE EFFECT OF DRUG, INITIAL ENCOUNTER: ICD-10-CM

## 2018-07-14 PROCEDURE — 96374 THER/PROPH/DIAG INJ IV PUSH: CPT | Performed by: EMERGENCY MEDICINE

## 2018-07-14 PROCEDURE — 25000128 H RX IP 250 OP 636: Performed by: EMERGENCY MEDICINE

## 2018-07-14 PROCEDURE — 99284 EMERGENCY DEPT VISIT MOD MDM: CPT | Mod: 25 | Performed by: EMERGENCY MEDICINE

## 2018-07-14 PROCEDURE — 99283 EMERGENCY DEPT VISIT LOW MDM: CPT | Mod: Z6 | Performed by: EMERGENCY MEDICINE

## 2018-07-14 RX ORDER — METHYLPREDNISOLONE 4 MG
TABLET, DOSE PACK ORAL
Qty: 21 TABLET | Refills: 0 | Status: SHIPPED | OUTPATIENT
Start: 2018-07-14 | End: 2018-08-21

## 2018-07-14 RX ORDER — DEXAMETHASONE SODIUM PHOSPHATE 10 MG/ML
10 INJECTION, SOLUTION INTRAMUSCULAR; INTRAVENOUS ONCE
Status: COMPLETED | OUTPATIENT
Start: 2018-07-14 | End: 2018-07-14

## 2018-07-14 RX ADMIN — DEXAMETHASONE SODIUM PHOSPHATE 10 MG: 10 INJECTION, SOLUTION INTRAMUSCULAR; INTRAVENOUS at 18:54

## 2018-07-14 ASSESSMENT — ENCOUNTER SYMPTOMS
CHEST TIGHTNESS: 0
DIZZINESS: 0
FEVER: 0
SHORTNESS OF BREATH: 0

## 2018-07-14 NOTE — ED TRIAGE NOTES
Coming in with a systemic rash. Came in to ED  on Thursday and discontinued unisom and started benadryl at that time. Today it is worse with blistering.

## 2018-07-14 NOTE — DISCHARGE INSTRUCTIONS
Medicine Reaction: Allergic  You are having an allergic reaction to a medicine you have taken. This causes an itchy rash and sometimes swelling of various parts of the body. It may also cause trouble swallowing or breathing. The rash may take a few hours or up to 2 weeks to go away. In the future, remember to tell your healthcare provider about your allergy to this medicine so that medicines of this type won't be used again.  Any medicine can cause an allergic reaction. But the most allergic reactions are caused by:    Penicillin and related medicines    Aspirin    Ibuprofen    Seizure medicines  Vaccines may also trigger allergies. People whose parents or siblings have allergies are at a higher risk of developing a medicine allergy. Allergy testing may sometimes be needed to figure out the cause.  Symptoms may occur within minutes, hours, or even weeks after exposure to the medicine. It can be a mild or severe reaction, or potentially life threatening. Most of us think of allergic reactions when we have a rash or itchy skin. Symptoms can include:    Rash, hives, redness, welts, blisters    Itching, burning, stinging, pain    Dry, flaky, cracking, scaly skin    Belly (abdominal) cramps or nausea or stomach pain    Fever.  Sometimes fever is the only symptom of a drug reaction. In older adults, the risk of fever increases with the number of medicines the person takes.  More severe symptoms include:    Swelling of the face or lips, or drooling    Trouble swallowing, feeling like your throat is closing    Trouble breathing, wheezing    Hoarse voice or trouble speaking    Severe nausea or vomiting or diarrhea      Feeling faint or lightheaded, rapid heart rate  Home care    The goal of treatment is to help relieve the symptoms, and get you feeling better. Mild to medium medicine reactions usually respond quickly to antihistamines and steroids. The rash will usually fade over several days. But it can sometimes last a  couple of weeks. Over the next couple of days, there may be times when it is gets a little worse, and then better again. Here are some things to do:    Throw the medicine away and don t take it again. The next reaction may be much worse.    Add this medicine reaction to your electronic medical record.    When getting a new medicine, always tell the healthcare provider that you are allergic to this medicine. Make certain the provider writes it down in your medical record.    Avoid tight clothing and anything that heats up your skin (hot showers or baths, direct sunlight). Heat will make itching worse.    An ice pack will relieve local areas of intense itching and redness. To make an ice pack, put ice cubes in a plastic bag that seals at the top. Wrap the bag in a clean, thin towel or cloth. Don t put ice directly on the skin.    To help prevent an infection, don't scratch the affected area. Scratching may worsen the reaction. It can damage your skin and lead to an infection. Always check the affected site for signs of an infection.    Your provider may give you a prescription antihistamine.    If you are not given a prescription antihistamine, oral diphenhydramine is an over-the-counter antihistamine available at pharmacies and grocery stores. This may be used to reduce itching if large areas of the skin are involved. This antihistamine may make you sleepy, so be careful using it in the daytime or when going to school, working, or driving. Note: Don t use diphenhydramine if you have glaucoma or if you are a man with trouble urinating due to an enlarged prostate. There are other antihistamines that cause less drowsiness and are a good choice for daytime use. Ask your pharmacist for suggestions.    Don't use diphenhydramine cream on your skin. It can cause a further skin reaction for some people.    Contact your healthcare provider and ask what can be used on the affected area to help decrease the itching.  Follow-up  care  Follow up with your healthcare provider, or as advised if your symptoms do not continue to improve or they get worse.  Call 911  Call 911 if any of these occur:    Shortness of breath    Cool, moist, pale skin    Swelling in the face, eyelids, mouth, tongue, or lips    Drooling    Trouble breathing or swallowing, wheezing    New or worsening swelling in the mouth, throat, or tongue    Hoarse voice or trouble speaking     Fainting or loss of consciousness    Rapid heart rate    Feeling of dizziness or weakness or a sudden drop in blood pressure    Feeling of doom    Feeling lightheaded    Severe nausea, vomiting, or diarrhea  When to seek medical advice  Call your healthcare provider right away if any of these occur:    Continuing or recurring symptoms    Nausea, abdominal cramps or stomach pain    Spreading areas of itching, redness or swelling    Signs of infection:  ? Spreading redness  ? Increased pain or swelling  ? Fever of 100.4 F (38 C) or above lasting for 24 to 48 hours, or as directed by your provider  ? Fluid or colored drainage from the affected area  Date Last Reviewed: 3/1/2017    0201-8100 [employer]. [address] [place] [address]. All rights reserved. This information is not intended as a substitute for professional medical care. Always follow your healthcare professional's instructions.      Follow-up with your regular doctor.  Return to the emergency department for any new or worsening symptoms.  If you develop any blistering or lesions in her mouth or blisters on your skin please also return to the emergency department

## 2018-07-14 NOTE — ED AVS SNAPSHOT
[facility], Emergency Department    [address] [address]    Phone:  [phone]                                       Supriya Herr   MRN: [id number]    Department:  [facility], Emergency Department   Date of Visit:  7/14/2018           Patient Information     Date Of Birth          1950        Your diagnoses for this visit were:     Adverse effect of drug, initial encounter        You were seen by Destin Abarham DO.        Discharge Instructions         Medicine Reaction: Allergic  You are having an allergic reaction to a medicine you have taken. This causes an itchy rash and sometimes swelling of various parts of the body. It may also cause trouble swallowing or breathing. The rash may take a few hours or up to 2 weeks to go away. In the future, remember to tell your healthcare provider about your allergy to this medicine so that medicines of this type won't be used again.  Any medicine can cause an allergic reaction. But the most allergic reactions are caused by:    Penicillin and related medicines    Aspirin    Ibuprofen    Seizure medicines  Vaccines may also trigger allergies. People whose parents or siblings have allergies are at a higher risk of developing a medicine allergy. Allergy testing may sometimes be needed to figure out the cause.  Symptoms may occur within minutes, hours, or even weeks after exposure to the medicine. It can be a mild or severe reaction, or potentially life threatening. Most of us think of allergic reactions when we have a rash or itchy skin. Symptoms can include:    Rash, hives, redness, welts, blisters    Itching, burning, stinging, pain    Dry, flaky, cracking, scaly skin    Belly (abdominal) cramps or nausea or stomach pain    Fever.  Sometimes fever is the only symptom of a drug reaction. In older adults, the risk of fever increases with the number of medicines the person takes.  More severe symptoms include:    Swelling of the face  or lips, or drooling    Trouble swallowing, feeling like your throat is closing    Trouble breathing, wheezing    Hoarse voice or trouble speaking    Severe nausea or vomiting or diarrhea      Feeling faint or lightheaded, rapid heart rate  Home care    The goal of treatment is to help relieve the symptoms, and get you feeling better. Mild to medium medicine reactions usually respond quickly to antihistamines and steroids. The rash will usually fade over several days. But it can sometimes last a couple of weeks. Over the next couple of days, there may be times when it is gets a little worse, and then better again. Here are some things to do:    Throw the medicine away and don t take it again. The next reaction may be much worse.    Add this medicine reaction to your electronic medical record.    When getting a new medicine, always tell the healthcare provider that you are allergic to this medicine. Make certain the provider writes it down in your medical record.    Avoid tight clothing and anything that heats up your skin (hot showers or baths, direct sunlight). Heat will make itching worse.    An ice pack will relieve local areas of intense itching and redness. To make an ice pack, put ice cubes in a plastic bag that seals at the top. Wrap the bag in a clean, thin towel or cloth. Don t put ice directly on the skin.    To help prevent an infection, don't scratch the affected area. Scratching may worsen the reaction. It can damage your skin and lead to an infection. Always check the affected site for signs of an infection.    Your provider may give you a prescription antihistamine.    If you are not given a prescription antihistamine, oral diphenhydramine is an over-the-counter antihistamine available at pharmacies and grocery stores. This may be used to reduce itching if large areas of the skin are involved. This antihistamine may make you sleepy, so be careful using it in the daytime or when going to school,  working, or driving. Note: Don t use diphenhydramine if you have glaucoma or if you are a man with trouble urinating due to an enlarged prostate. There are other antihistamines that cause less drowsiness and are a good choice for daytime use. Ask your pharmacist for suggestions.    Don't use diphenhydramine cream on your skin. It can cause a further skin reaction for some people.    Contact your healthcare provider and ask what can be used on the affected area to help decrease the itching.  Follow-up care  Follow up with your healthcare provider, or as advised if your symptoms do not continue to improve or they get worse.  Call 911  Call 911 if any of these occur:    Shortness of breath    Cool, moist, pale skin    Swelling in the face, eyelids, mouth, tongue, or lips    Drooling    Trouble breathing or swallowing, wheezing    New or worsening swelling in the mouth, throat, or tongue    Hoarse voice or trouble speaking     Fainting or loss of consciousness    Rapid heart rate    Feeling of dizziness or weakness or a sudden drop in blood pressure    Feeling of doom    Feeling lightheaded    Severe nausea, vomiting, or diarrhea  When to seek medical advice  Call your healthcare provider right away if any of these occur:    Continuing or recurring symptoms    Nausea, abdominal cramps or stomach pain    Spreading areas of itching, redness or swelling    Signs of infection:  ? Spreading redness  ? Increased pain or swelling  ? Fever of 100.4 F (38 C) or above lasting for 24 to 48 hours, or as directed by your provider  ? Fluid or colored drainage from the affected area  Date Last Reviewed: 3/1/2017    2344-5639 [employer]. [address] [place], [place] [id number]. All rights reserved. This information is not intended as a substitute for professional medical care. Always follow your healthcare professional's instructions.      Follow-up with your regular doctor.  Return to the emergency department for any  new or worsening symptoms.  If you develop any blistering or lesions in her mouth or blisters on your skin please also return to the emergency department    Your next 10 appointments already scheduled     Jul 18, 2018  3:15 PM CDT   (Arrive by 2:45 PM)   Return Visit with Kathryn Basilio MD   [facility] Nephrology ([facility])    [address]  [address]  [facility] [address]   [phone]            Jul 18, 2018  4:00 PM CDT   Lab with  LAB   [facility] Lab ([facility])    [address] [place] [address]  1st Floor  [facility] [address]   [phone]            Jul 31, 2018  6:50 PM CDT   (Arrive by 6:35 PM)   RETURN FOOT/ANKLE with Alvaro Gautam MD   [facility] ([facility])    [address] [place] [address]  4th Floor  [facility] [address] [phone]            Aug 02, 2018  9:00 AM CDT   (Arrive by 8:45 AM)   Return Visit with Ewelina Chen MD   [facility] Infectious Diseases ([facility])    [address]  [address]  [place] [facility] [place] [address] [phone]            Sep 20, 2018  3:00 PM CDT   (Arrive by 2:45 PM)   RETURN DIABETES with Arabella Kamara PA-C   [facility] Endocrinology ([facility])    [address] [place] [address]  3rd Floor  [facility] [address] [phone]            Jan 22, 2019  3:20 PM CST   CT CHEST W/O CONTRAST with UCCT2   [facility] [place] [facility] ([facility])    [address] [place] [address]  1st Floor  [facility] [address] [phone]           Please bring any scans or X-rays taken at other hospitals, if similar tests were done. Also bring a list of your medicines, including vitamins, minerals and over-the-counter drugs. It is safest to leave personal items at home.  Be sure to tell your doctor:   If you have any allergies.   If there s any chance you are pregnant.   If  you are breastfeeding.  You do not need to do anything special to prepare for this exam.  Please wear loose clothing, such as a sweat suit or jogging clothes. Avoid snaps, zippers and other metal. We may ask you to undress and put on a hospital gown.            Jan 22, 2019  4:30 PM CST   (Arrive by 4:15 PM)   Return Visit with Ravin King MD   [facility] ([facility])    [address]  [address]  [facility] [address] [phone]              24 Hour Appointment Hotline       To make an appointment at any [facility], call [phone] ([phone]). If you don't have a family doctor or clinic, we will help you find one. [facility] clinics are conveniently located to serve the needs of you and your family.             Review of your medicines      START taking        Dose / Directions Last dose taken    methylPREDNISolone 4 MG tablet   Commonly known as:  MEDROL DOSEPAK   Quantity:  21 tablet        Follow package instructions   Refills:  0          Our records show that you are taking the medicines listed below. If these are incorrect, please call your family doctor or clinic.        Dose / Directions Last dose taken    ACE/ARB/ARNI NOT PRESCRIBED (INTENTIONAL)        Please choose reason not prescribed, below   Refills:  0        acetaminophen 325 MG tablet   Commonly known as:  TYLENOL   Dose:  650 mg   Quantity:  100 tablet        Take 2 tablets (650 mg) by mouth every 4 hours as needed for mild pain   Refills:  0        albuterol 108 (90 Base) MCG/ACT Inhaler   Commonly known as:  PROAIR HFA/PROVENTIL HFA/VENTOLIN HFA   Dose:  2 puff   Quantity:  3 Inhaler        Inhale 2 puffs into the lungs every 6 hours as needed for shortness of breath / dyspnea or wheezing   Refills:  1        ampicillin-sulbactam 3 (2-1) g Solr vial   Commonly known as:  UNASYN   Dose:  3 g   Indication:  diabetic foot infection   Quantity:  82 each        Inject 3 g  into the vein every 12 hours   Refills:  0        atorvastatin 10 MG tablet   Commonly known as:  LIPITOR   Dose:  10 mg   Quantity:  30 tablet        Take 1 tablet (10 mg) by mouth daily   Refills:  0        blood glucose monitoring test strip   Commonly known as:  ONETOUCH ULTRA   Quantity:  200 strip        Test your blood sugar 3-4 times per day.   Refills:  3        calcitRIOL 0.25 MCG capsule   Commonly known as:  ROCALTROL   Quantity:  30 capsule        TAKE 1 CAPSULE (0.25 MCG) BY MOUTH DAILY   Refills:  3        carvedilol 12.5 MG tablet   Commonly known as:  COREG   Dose:  12.5 mg   Quantity:  60 tablet        Take 1 tablet (12.5 mg) by mouth 2 times daily (with meals)   Refills:  11        clindamycin 1 % lotion   Commonly known as:  CLEOCIN T   Quantity:  60 mL        Apply topically 2 times daily   Refills:  3        furosemide 40 MG tablet   Commonly known as:  LASIX   Dose:  40 mg   Quantity:  30 tablet        Take 1 tablet (40 mg) by mouth 2 times daily   Refills:  11        insulin glargine 100 UNIT/ML injection   Commonly known as:  LANTUS   Dose:  25 Units   Quantity:  3 mL        Inject 25 Units Subcutaneous At Bedtime   Refills:  1        insulin pen needle 31G X 6 MM   Commonly known as:  ULTICARE MINI   Quantity:  100 each        Use daily or as directed.   Refills:  1        NovoLOG FLEXPEN 100 UNIT/ML injection   Quantity:  15 mL   Generic drug:  insulin aspart        Inject 4 units SQ with breakfast, lunch and dinner.   Refills:  3        order for DME   Quantity:  1 Device        Equipment being ordered: Compression stockings, 20-30 MMHG, knee high   Refills:  0        * order for DME   Quantity:  2 Device        Equipment being ordered: TEDS stocking  Below the knee 15-20 mg Dispense 2 Use daily   Refills:  1        * order for DME   Quantity:  1 Device        1 wheelchair   Refills:  0        * order for DME   Quantity:  1 Units        Equipment being ordered: Right Lower extremity  Solaris Ready wrap calf piece:  Size small/length tall , knee piece: Size small , Thigh piece size small/length average.   Refills:  0        order for DME   Quantity:  1 Device        1 SAD light   Refills:  1        sertraline 50 MG tablet   Commonly known as:  ZOLOFT   Quantity:  30 tablet        Take 1/2 tablet (25 mg) for 1-2 weeks, then increase to 1 tablet orally daily   Refills:  0        Urea 20 % Crea cream   Quantity:  85 g        Apply topically daily   Refills:  3        vitamin D 2000 units tablet   Dose:  2000 Units   Quantity:  100 tablet        Take 2,000 Units by mouth daily   Refills:  3        * Notice:  This list has 3 medication(s) that are the same as other medications prescribed for you. Read the directions carefully, and ask your doctor or other care provider to review them with you.            Prescriptions were sent or printed at these locations (1 Prescription)                   Other Prescriptions                Printed at Department/Unit printer (1 of 1)         methylPREDNISolone (MEDROL DOSEPAK) 4 MG tablet                Orders Needing Specimen Collection     None      Pending Results     No orders found from 7/12/2018 to 7/15/2018.            Pending Culture Results     No orders found from 7/12/2018 to 7/15/2018.            Pending Results Instructions     If you had any lab results that were not finalized at the time of your Discharge, you can call the ED Lab Result RN at [phone]. You will be contacted by this team for any positive Lab results or changes in treatment. The nurses are available 7 days a week from 10A to 6:30P.  You can leave a message 24 hours per day and they will return your call.        Thank you for choosing [facility]       Thank you for choosing [facility] for your care. Our goal is always to provide you with excellent care. Hearing back from our patients is one way we can continue to improve our services. Please take a few minutes to complete the written  survey that you may receive in the mail after you visit with us. Thank you!        Care EveryWhere ID     This is your Care EveryWhere ID. This could be used by other organizations to access your [facility] medical records  [id number]        Equal Access to Services     KALYANI WARREN : Emeterio Lam, wabo crocker, qaybta kaalmada ailin, madeline chaudhari. So [facility] [phone].    ATENCIÓN: Si habla español, tiene a santoro disposición servicios gratuitos de asistencia lingüística. Llame al [phone].    We comply with applicable federal civil rights laws and [place] laws. We do not discriminate on the basis of race, color, national origin, age, disability, sex, sexual orientation, or gender identity.            After Visit Summary       This is your record. Keep this with you and show to your community pharmacist(s) and doctor(s) at your next visit.
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
## 2018-07-14 NOTE — ED AVS SNAPSHOT
[facility], [place], Emergency Department    [address] [place] [id number]    Phone:  [phone]                                       Supriya Herr   MRN: [id number]    Department:  [facility], Emergency Department   Date of Visit:  7/14/2018           After Visit Summary Signature Page     I have received my discharge instructions, and my questions have been answered. I have discussed any challenges I see with this plan with the nurse or doctor.    ..........................................................................................................................................  Patient/Patient Representative Signature      ..........................................................................................................................................  Patient Representative Print Name and Relationship to Patient    ..................................................               ................................................  Date                                            Time    ..........................................................................................................................................  Reviewed by Signature/Title    ...................................................              ..............................................  Date                                                            Time

## 2018-07-16 ENCOUNTER — TELEPHONE (OUTPATIENT)
Dept: DERMATOLOGY | Facility: CLINIC | Age: 68
End: 2018-07-16

## 2018-07-16 NOTE — PROGRESS NOTES
This is a recent snapshot of the patient's [facility] medical record.  For current drug dose and complete information and questions, call [phone] or In Basket pool, [facility] home infusion ([id number])  CSN Number:  [id number]

## 2018-07-16 NOTE — TELEPHONE ENCOUNTER
Spoke with Caroline ( daughter) and scheduled appt for 7/19 with Dr. Kim. Will keep allergy appt for now and cancel if it is not needed.

## 2018-07-16 NOTE — TELEPHONE ENCOUNTER
[facility] Call Center    Phone Message    May a detailed message be left on voicemail: yes    Reason for Call: Other: Patient was recently seen in ED twice for allergic reaction to IV Infusion.  Scheduled for New appt and would like for her mother to be seen sooner than next Friday with anyone anytime sooner.       Action Taken: Message routed to:  [facility] debbie

## 2018-07-16 NOTE — TELEPHONE ENCOUNTER
Spoke w/Erick. Pt has derm appt on 7/19 and they will go to that appt to address rash. If derm feels she needs to be seen sooner for infectious concerns, they will let us know or Erick will call us back. But for now, plan is for pt to be seen by derm.  Viky Shi RN

## 2018-07-16 NOTE — TELEPHONE ENCOUNTER
[facility] Call Center    Phone Message    May a detailed message be left on voicemail: yes    Reason for Call: Other: Home care nurse Erick wondering if pt can be seen sooner bc of rash. Medications are not helping. Pt is scheduled for 08/02/2018 but need a sooner appt.      Action Taken: Message routed to:  [facility]): Infectious Disease

## 2018-07-16 NOTE — PROGRESS NOTES
This is a recent snapshot of the patient's [facility] medical record.  For current drug dose and complete information and questions, call [phone] or In Basket pool, [facility] home infusion ([id number])  CSN Number:  [id number]

## 2018-07-17 ENCOUNTER — TELEPHONE (OUTPATIENT)
Dept: ENDOCRINOLOGY | Facility: CLINIC | Age: 68
End: 2018-07-17

## 2018-07-17 ENCOUNTER — TELEPHONE (OUTPATIENT)
Dept: TRANSPLANT | Facility: CLINIC | Age: 68
End: 2018-07-17

## 2018-07-17 ENCOUNTER — PATIENT OUTREACH (OUTPATIENT)
Dept: CARE COORDINATION | Facility: CLINIC | Age: 68
End: 2018-07-17

## 2018-07-17 DIAGNOSIS — E11.65 TYPE 2 DIABETES MELLITUS WITH HYPERGLYCEMIA, WITH LONG-TERM CURRENT USE OF INSULIN (H): ICD-10-CM

## 2018-07-17 DIAGNOSIS — Z79.4 TYPE 2 DIABETES MELLITUS WITH DIABETIC NEUROPATHY, WITH LONG-TERM CURRENT USE OF INSULIN (H): ICD-10-CM

## 2018-07-17 DIAGNOSIS — E11.40 TYPE 2 DIABETES MELLITUS WITH DIABETIC NEUROPATHY, WITH LONG-TERM CURRENT USE OF INSULIN (H): ICD-10-CM

## 2018-07-17 DIAGNOSIS — Z79.4 TYPE 2 DIABETES MELLITUS WITH HYPERGLYCEMIA, WITH LONG-TERM CURRENT USE OF INSULIN (H): ICD-10-CM

## 2018-07-17 DIAGNOSIS — L03.90 CELLULITIS: Primary | ICD-10-CM

## 2018-07-17 LAB
AST SERPL W P-5'-P-CCNC: 12 U/L (ref 0–45)
BASOPHILS # BLD AUTO: 0 10E9/L (ref 0–0.2)
BASOPHILS NFR BLD AUTO: 0 %
CRP SERPL-MCNC: 17.9 MG/L (ref 0–8)
DIFFERENTIAL METHOD BLD: ABNORMAL
EOSINOPHIL # BLD AUTO: 0.1 10E9/L (ref 0–0.7)
EOSINOPHIL NFR BLD AUTO: 0.8 %
ERYTHROCYTE [DISTWIDTH] IN BLOOD BY AUTOMATED COUNT: 12.7 % (ref 10–15)
ERYTHROCYTE [SEDIMENTATION RATE] IN BLOOD BY WESTERGREN METHOD: 75 MM/H (ref 0–30)
HCT VFR BLD AUTO: 29.3 % (ref 35–47)
HGB BLD-MCNC: 9.5 G/DL (ref 11.7–15.7)
IMM GRANULOCYTES # BLD: 0 10E9/L (ref 0–0.4)
IMM GRANULOCYTES NFR BLD: 0.4 %
LYMPHOCYTES # BLD AUTO: 1 10E9/L (ref 0.8–5.3)
LYMPHOCYTES NFR BLD AUTO: 12.5 %
MCH RBC QN AUTO: 29.9 PG (ref 26.5–33)
MCHC RBC AUTO-ENTMCNC: 32.4 G/DL (ref 31.5–36.5)
MCV RBC AUTO: 92 FL (ref 78–100)
MONOCYTES # BLD AUTO: 0.3 10E9/L (ref 0–1.3)
MONOCYTES NFR BLD AUTO: 4 %
NEUTROPHILS # BLD AUTO: 6.2 10E9/L (ref 1.6–8.3)
NEUTROPHILS NFR BLD AUTO: 82.3 %
NRBC # BLD AUTO: 0 10*3/UL
NRBC BLD AUTO-RTO: 0 /100
PLATELET # BLD AUTO: 289 10E9/L (ref 150–450)
RBC # BLD AUTO: 3.18 10E12/L (ref 3.8–5.2)
WBC # BLD AUTO: 7.6 10E9/L (ref 4–11)

## 2018-07-17 PROCEDURE — 84450 TRANSFERASE (AST) (SGOT): CPT | Performed by: INTERNAL MEDICINE

## 2018-07-17 PROCEDURE — 85025 COMPLETE CBC W/AUTO DIFF WBC: CPT | Performed by: INTERNAL MEDICINE

## 2018-07-17 PROCEDURE — 85652 RBC SED RATE AUTOMATED: CPT | Performed by: INTERNAL MEDICINE

## 2018-07-17 PROCEDURE — 86140 C-REACTIVE PROTEIN: CPT | Performed by: INTERNAL MEDICINE

## 2018-07-17 RX ORDER — INSULIN ASPART 100 [IU]/ML
4 INJECTION, SOLUTION INTRAVENOUS; SUBCUTANEOUS
Qty: 15 ML | Refills: 3 | COMMUNITY
Start: 2018-07-17 | End: 2019-02-15

## 2018-07-17 NOTE — TELEPHONE ENCOUNTER
Lets increase the dose of Lantus to 28 U and the dose of Novolog to 7 U with meals. Arabella can reevaluate tomorrow. Please let her know.  Daughter notified.

## 2018-07-17 NOTE — TELEPHONE ENCOUNTER
Health Call Center    Phone Message    May a detailed message be left on voicemail: yes    Reason for Call: Other: Patient was seen in the ER for rash due to allergic reaction to medication. They started her on dexamethasone & since then her blood sugars have been elevated. 7/14/18: 8 p.m. 144; 7/15/18:  10:30 a.m. 218, 3:30 p.m. 300, 7:15 p.m. 226; 7/16/18 8:30 a.m. 340, 12:00 p.m. 331, 7:30 p.m. 402, and 10:00 p.m. 295 (she gave herself an additional 2 units of lantus).  Please contact Erick, her  Home Care nurse, back at [phone].        Action Taken: Message routed to:  [facility]): Endocrinology

## 2018-07-17 NOTE — TELEPHONE ENCOUNTER
Caroline, pt's daughter, wanted to make sure coordinator was aware her mom was recently hospitalized. Pt currently getting IV infusions at home for potential bone infection on her foot. Pt has f/u appointment with dermatology this Thursday for rash. Caroline states she will call coordinator once pt's foot is healed so pt can be reviewed for active status on kidney transplant wait list.

## 2018-07-17 NOTE — PROGRESS NOTES
Clinic Care Coordination Contact    Clinic Care Coordination Contact  OUTREACH    Referral Information:       Primary Diagnosis: Orthopedic    Chief Complaint   Patient presents with     Clinic Care Coordination - Follow-up     RN        Universal Utilization: frequent hospitalizations; ED visits  Clinic Utilization  Difficulty keeping appointments:: Yes  Utilization    Last refreshed: 7/16/2018  5:34 PM:  No Show Count (past year) 6       Last refreshed: 7/16/2018  5:34 PM:  ED visits 2       Last refreshed: 7/16/2018  5:34 PM:  Hospital admissions 4          Current as of: 7/16/2018  5:34 PM             Clinical Concerns:  Current Medical Concerns:  Body rash. Currently undergoing IV antibiotic therapy    Current Behavioral Concerns: none    Education Provided to patient: home treatment measures for rash; symptoms of and treatment for vaginal yeast   Pain  Chronic pain (GOAL):: No  Health Maintenance Reviewed:    Clinical Pathway: None    Medication Management:  Daughter giving IV antibiotics as prescribed     Functional Status:       Living Situation:       Diet/Exercise/Sleep:  Tube Feeding: No  Exercise:: Unable to exercise  Inadequate activity/exercise (GOAL):: No    Transportation:  Transportation concerns (GOAL):: No  Transportation means:: [employer]     Psychosocial:        Financial/Insurance:      No concerns     Resources and Interventions:  Current Resources:    ;                         Goals:         Patient/Caregiver understanding: good       Future Appointments              Tomorrow Arabella Kamara PA-C [facility] Endocrinology, [facility]    Tomorrow UC LAB [facility] Lab, [facility]    Tomorrow Kathryn Basilio MD [facility] Nephrology, [facility]    In 2 days [facility]omy Kim [facility]    In 1 week Zhane Blanc PA-C [facility], [facility]    In 1 week Owen Hurt [facility]    In 2 weeks Alvaro Gautam MD [facility] Orthopaedic  Clinic, [facility]    In 2 weeks Ewelina Chen MD [facility] and Infectious Diseases, [facility]    In 2 months Arabella Kamara PA-C [facility] Endocrinology, [facility]    In 6 months [facility] CT, [facility]    In 6 months Ravin King MD [facility], [facility]          Plan: continue home treatment measures as directed. Keep apps with dermatology and endocrinology as scheduled this week.     Carleen Valdes R.N.  Clinic Care Coordinator  [facility] Primary Care [facility]  [phone]

## 2018-07-17 NOTE — TELEPHONE ENCOUNTER
Supriya is a [facility] pt that will see Arabella tomorrow but was placed on  Medrol dose pack Sunday ( 6 day plan)   4 mg  6  Tabs Sunday, 5 tabs   Monday and 4 tabs  Today then  3, 2,1 for the  6 days.  . BS listed below. She is currently on Lantus 25 units and Novolog   4 units with meals.What should they do  to keep her numbers down while on this dose pack? .

## 2018-07-17 NOTE — TELEPHONE ENCOUNTER
I spoke with her daughter and she will give her lantus 28 units tonight  And Novolog 7 units with meals. Scheduled to see Arabella tomorrow for further orders.

## 2018-07-18 ENCOUNTER — OFFICE VISIT (OUTPATIENT)
Dept: ENDOCRINOLOGY | Facility: CLINIC | Age: 68
End: 2018-07-18
Payer: MEDICARE

## 2018-07-18 ENCOUNTER — OFFICE VISIT (OUTPATIENT)
Dept: NEPHROLOGY | Facility: CLINIC | Age: 68
End: 2018-07-18
Attending: INTERNAL MEDICINE
Payer: MEDICARE

## 2018-07-18 ENCOUNTER — HOME INFUSION (PRE-WILLOW HOME INFUSION) (OUTPATIENT)
Dept: PHARMACY | Facility: CLINIC | Age: 68
End: 2018-07-18

## 2018-07-18 VITALS
DIASTOLIC BLOOD PRESSURE: 74 MMHG | TEMPERATURE: 98.3 F | SYSTOLIC BLOOD PRESSURE: 177 MMHG | HEART RATE: 57 BPM | OXYGEN SATURATION: 99 %

## 2018-07-18 VITALS — HEART RATE: 59 BPM | DIASTOLIC BLOOD PRESSURE: 70 MMHG | SYSTOLIC BLOOD PRESSURE: 180 MMHG

## 2018-07-18 DIAGNOSIS — I10 HYPERTENSION GOAL BP (BLOOD PRESSURE) < 140/90: ICD-10-CM

## 2018-07-18 DIAGNOSIS — Z79.4 TYPE 2 DIABETES MELLITUS WITH DIABETIC NEUROPATHY, WITH LONG-TERM CURRENT USE OF INSULIN (H): Primary | ICD-10-CM

## 2018-07-18 DIAGNOSIS — E11.40 TYPE 2 DIABETES MELLITUS WITH DIABETIC NEUROPATHY, WITH LONG-TERM CURRENT USE OF INSULIN (H): Primary | ICD-10-CM

## 2018-07-18 DIAGNOSIS — E87.5 HYPERKALEMIA: ICD-10-CM

## 2018-07-18 DIAGNOSIS — I50.20 SYSTOLIC CONGESTIVE HEART FAILURE, UNSPECIFIED CONGESTIVE HEART FAILURE CHRONICITY: ICD-10-CM

## 2018-07-18 DIAGNOSIS — L97.512 PLANTAR ULCER OF RIGHT FOOT WITH FAT LAYER EXPOSED (H): ICD-10-CM

## 2018-07-18 DIAGNOSIS — N18.5 CKD (CHRONIC KIDNEY DISEASE) STAGE 5, GFR LESS THAN 15 ML/MIN (H): Primary | ICD-10-CM

## 2018-07-18 DIAGNOSIS — N18.5 CKD (CHRONIC KIDNEY DISEASE) STAGE 5, GFR LESS THAN 15 ML/MIN (H): ICD-10-CM

## 2018-07-18 LAB
ALBUMIN SERPL-MCNC: 2.6 G/DL (ref 3.4–5)
ANION GAP SERPL CALCULATED.3IONS-SCNC: 9 MMOL/L (ref 3–14)
BUN SERPL-MCNC: 91 MG/DL (ref 7–30)
CALCIUM SERPL-MCNC: 8.4 MG/DL (ref 8.5–10.1)
CHLORIDE SERPL-SCNC: 111 MMOL/L (ref 94–109)
CO2 SERPL-SCNC: 23 MMOL/L (ref 20–32)
CREAT SERPL-MCNC: 3.95 MG/DL (ref 0.52–1.04)
FERRITIN SERPL-MCNC: 450 NG/ML (ref 8–252)
GFR SERPL CREATININE-BSD FRML MDRD: 11 ML/MIN/1.7M2
GLUCOSE SERPL-MCNC: 129 MG/DL (ref 70–99)
HGB BLD-MCNC: 9.8 G/DL (ref 11.7–15.7)
IRON SATN MFR SERPL: 39 % (ref 15–46)
IRON SERPL-MCNC: 76 UG/DL (ref 35–180)
PHOSPHATE SERPL-MCNC: 4 MG/DL (ref 2.5–4.5)
POTASSIUM SERPL-SCNC: 4.2 MMOL/L (ref 3.4–5.3)
SODIUM SERPL-SCNC: 142 MMOL/L (ref 133–144)
TIBC SERPL-MCNC: 196 UG/DL (ref 240–430)

## 2018-07-18 PROCEDURE — 83540 ASSAY OF IRON: CPT | Performed by: INTERNAL MEDICINE

## 2018-07-18 PROCEDURE — 83550 IRON BINDING TEST: CPT | Performed by: INTERNAL MEDICINE

## 2018-07-18 PROCEDURE — 82728 ASSAY OF FERRITIN: CPT | Performed by: INTERNAL MEDICINE

## 2018-07-18 PROCEDURE — 85018 HEMOGLOBIN: CPT | Performed by: INTERNAL MEDICINE

## 2018-07-18 PROCEDURE — 80069 RENAL FUNCTION PANEL: CPT | Performed by: INTERNAL MEDICINE

## 2018-07-18 PROCEDURE — G0463 HOSPITAL OUTPT CLINIC VISIT: HCPCS | Mod: ZF

## 2018-07-18 ASSESSMENT — PAIN SCALES - GENERAL
PAINLEVEL: NO PAIN (0)
PAINLEVEL: NO PAIN (0)
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
## 2018-07-18 NOTE — MR AVS SNAPSHOT
After Visit Summary   7/18/2018    Supriya Herr    MRN: [id number]           Patient Information     Date Of Birth          1950        Visit Information        Provider Department      7/18/2018 3:15 PM Kathryn Basilio MD [facility] Nephrology        Today's Diagnoses     CKD (chronic kidney disease) stage 5, GFR less than 15 ml/min (H)    -  1    Hypertension goal BP (blood pressure) < 140/90        Systolic congestive heart failure, unspecified congestive heart failure chronicity (H)        Plantar ulcer of right foot with fat layer exposed (H)        Hyperkalemia           Follow-ups after your visit        Follow-up notes from your care team     Return in about 2 months (around 9/18/2018).      Your next 10 appointments already scheduled     Aug 13, 2018  3:45 PM CDT   Return Visit with Kathryn Basilio MD   [facility] ([facility])    [address] [place] [id number] [phone]            Aug 21, 2018  5:00 PM CDT   Office Visit with Noam Jackson MD   [facility]    [address] [place] [id number] [phone]           Bring a current list of meds and any records pertaining to this visit. For Physicals, please bring immunization records and any forms needing to be filled out. Please arrive 10 minutes early to complete paperwork.            Sep 13, 2018  2:30 PM CDT   (Arrive by 2:15 PM)   Office Visit with Brenden Blackwell [facility] ([facility])    [address] [id number] [phone]           Bring a current list of meds and any records pertaining to this visit. For Physicals, please bring immunization records and any forms needing to be filled out. Please arrive 10 minutes early to complete paperwork.            Sep 13, 2018  3:30 PM CDT   Lab with  [facility]   [facility] ([facility])    [address]  1st Floor  [facility] [id number] [phone]            Sep 13, 2018  4:30 PM CDT   (Arrive by 4:00 PM)   Return Visit with Kathryn Basilio MD   [facility]ph[facility] ([facility])    [address]  [address]  [facility] [id number] [phone]            Sep 20, 2018  3:00 PM CDT   (Arrive by 2:45 PM)   RETURN DIABETES with Arabella Kamara PA-C   [facility]ocr[facility] ([facility])    [address]  3rd Floor  [facility] [id number] [phone]            Jan 22, 2019  3:20 PM CST   CT CHEST W/O CONTRAST with [facility]   [facility] CT ([facility])    [address]  1st Floor  [facility] [id number] [phone]           Please bring any scans or X-rays taken at other hospitals, if similar tests were done. Also bring a list of your medicines, including vitamins, minerals and over-the-counter drugs. It is safest to leave personal items at home.  Be sure to tell your doctor:   If you have any allergies.   If there s any chance you are pregnant.   If you are breastfeeding.  You do not need to do anything special to prepare for this exam.  Please wear loose clothing, such as a sweat suit or jogging clothes. Avoid snaps, zippers and other metal. We may ask you to undress and put on a hospital gown.            Jan 22, 2019  4:30 PM CST   (Arrive by 4:15 PM)   Return Visit with Ravin King MD   [facility] ([facility])    [address]  [address]  [facility] [id number] [phone]              Who to contact     If you have questions or need follow up information about today's clinic visit or your schedule please contact [facility] NEPHROLOGY directly at [phone].  Normal or non-critical lab and imaging results will be communicated to you  by [employer], letter or phone within 4 business days after the clinic has received the results. If you do not hear from us within 7 days, please contact the clinic through [employer] or phone. If you have a critical or abnormal lab result, we will notify you by phone as soon as possible.  Submit refill requests through [employer] or call your pharmacy and they will forward the refill request to us. Please allow 3 business days for your refill to be completed.          Additional Information About Your Visit        [employer]-Change Information     [employer] gives you secure access to your electronic health record. If you see a primary care provider, you can also send messages to your care team and make appointments. If you have questions, please call your primary care clinic.  If you do not have a primary care provider, please call [phone] and they will assist you.        Care EveryWhere ID     This is your Care EveryWhere ID. This could be used by other organizations to access your [facility] medical records  [id number]        Your Vitals Were     Pulse Temperature Pulse Oximetry             57 98.3  F (36.8  C) (Oral) 99%          Blood Pressure from Last 3 Encounters:   08/02/18 165/60   07/18/18 177/74   07/18/18 180/70    Weight from Last 3 Encounters:   07/14/18 86.6 kg (191 lb)   07/12/18 87.2 kg (192 lb 3.9 oz)   06/25/18 86.6 kg (191 lb)              Today, you had the following     No orders found for display       Primary Care Provider Office Phone # Fax #    Noam Luis Jackson [phone]       [address] [facility] [address]  [facility] [place] [address]        Equal Access to Services     [facility]: Hadii aad ku hadasho Soomaali, waaxda luqadaha, qaybta kaalmada ailin, madeline chaudhari. So [facility] [phone].    ATENCIÓN: Si habla español, tiene a santoro disposición servicios gratuitos de asistencia lingüística. Renuka al [phone].    We comply with applicable federal civil rights  laws and [place] laws. We do not discriminate on the basis of race, color, national origin, age, disability, sex, sexual orientation, or gender identity.            Thank you!     Thank you for choosing [facility] NEPHROLOG[facility]  for your care. Our goal is always to provide you with excellent care. Hearing back from our patients is one way we can continue to improve our services. Please take a few minutes to complete the written survey that you may receive in the mail after your visit with us. Thank you!             Your Updated Medication List - Protect others around you: Learn how to safely use, store and throw away your medicines at www.disposemymeds.org.          This list is accurate as of 7/18/18 11:59 PM.  Always use your most recent med list.                   Brand Name Dispense Instructions for use Diagnosis    ACE/ARB/ARNI NOT PRESCRIBED (INTENTIONAL)      Please choose reason not prescribed, below    CKD (chronic kidney disease) stage 5, GFR less than 15 ml/min (H)       acetaminophen 325 MG tablet    TYLENOL    100 tablet    Take 2 tablets (650 mg) by mouth every 4 hours as needed for mild pain    Diabetic ulcer of toe of right foot associated with type 2 diabetes mellitus, with other ulcer severity (H)       albuterol 108 (90 Base) MCG/ACT inhaler    PROAIR HFA/PROVENTIL HFA/VENTOLIN HFA    3 Inhaler    Inhale 2 puffs into the lungs every 6 hours as needed for shortness of breath / dyspnea or wheezing    Cough       ampicillin-sulbactam 3 (2-1) g Solr vial    UNASYN    82 each    Inject 3 g into the vein every 12 hours    Diabetic ulcer of toe of right foot associated with type 2 diabetes mellitus, with other ulcer severity (H)       atorvastatin 10 MG tablet    LIPITOR    30 tablet    Take 1 tablet (10 mg) by mouth daily    Hyperlipidemia LDL goal <100       BENADRYL PO      Take 25 mg by mouth        calcitRIOL 0.25 MCG capsule    ROCALTROL    30 capsule    TAKE 1 CAPSULE (0.25 MCG) BY MOUTH DAILY     Vitamin D deficiency       carvedilol 12.5 MG tablet    COREG    60 tablet    Take 1 tablet (12.5 mg) by mouth 2 times daily (with meals)    Essential hypertension with goal blood pressure less than 140/90       clindamycin 1 % lotion    CLEOCIN T    60 mL    Apply topically 2 times daily    Intertrigo       furosemide 40 MG tablet    LASIX    30 tablet    Take 1 tablet (40 mg) by mouth 2 times daily    Lymphedema of both lower extremities       insulin glargine 100 UNIT/ML injection    LANTUS    3 mL    Inject 25 Units Subcutaneous At Bedtime    Type 2 diabetes mellitus with diabetic neuropathy, with long-term current use of insulin (H)       insulin pen needle 31G X 6 MM    ULTICARE MINI    100 each    Use daily or as directed.    Type 2 diabetes, HbA1c goal < 7% (H)       methylPREDNISolone 4 MG tablet    MEDROL DOSEPAK    21 tablet    Follow package instructions        NovoLOG FLEXPEN 100 UNIT/ML injection   Generic drug:  insulin aspart     15 mL    4 Units Inject 7units SQ with breakfast, lunch and dinner.    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     Edema of lower extremity       order for DME     1 Device    1 SAD light    Seasonal affective disorder (H)       sertraline 50 MG tablet    ZOLOFT    30 tablet    Take 1/2 tablet (25 mg) for 1-2 weeks, then increase to 1 tablet orally daily    Acute reaction to stress       Urea 20 % Crea cream     85 g    Apply topically daily    Xerosis cutis, Tyloma       vitamin D 2000 units tablet     100 tablet    Take 2,000 Units by mouth daily    Vitamin D deficiency       * Notice:  This list has 3 medication(s) that are the same as other medications prescribed for you. Read the directions carefully, and ask your doctor or other care provider to review them with you.

## 2018-07-18 NOTE — PATIENT INSTRUCTIONS
1.  Take Lantus 27 units at bedtime and Novolog 7 units with meals and 2 units with afternoon snack while taking prednisone ( steroids ).  2.  Take Lantus 25 units at bedtime and Novolog 4 units with meals and 2 units with afternoon snack on Saturday 7/21/2018 when you are no longer taking prednisone.  3.  See eye doctor.  Arabella Kamara PA-C

## 2018-07-18 NOTE — NURSING NOTE
Chief Complaint   Patient presents with     Blood Draw     Labs drawn via PICC by RN. Line flushed and saline locked. Lab only appointment.     Kylah Johnson RN
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
## 2018-07-18 NOTE — MR AVS SNAPSHOT
After Visit Summary   7/18/2018    Supriya Herr    MRN: [id number]           Patient Information     Date Of Birth          1950        Visit Information        Provider Department      7/18/2018 12:30 PM Arabella Kamara PA-C [facility] Endocrinology        Care Instructions    1.  Take Lantus 27 units at bedtime and Novolog 7 units with meals and 2 units with afternoon snack while taking prednisone ( steroids ).  2.  Take Lantus 25 units at bedtime and Novolog 4 units with meals and 2 units with afternoon snack on Saturday 7/21/2018 when you are no longer taking prednisone.  3.  See eye doctor.  Arabella Kamara PA-C          Follow-ups after your visit        Your next 10 appointments already scheduled     Jul 18, 2018  2:15 PM CDT   Lab with  LAB   [facility] ([facility])    [address] [place] [id number] [phone]            Jul 18, 2018  3:15 PM CDT   (Arrive by 2:45 PM)   Return Visit with Kathryn Basilio MD   [facility] Nephrology ([facility])    [address] [place] [address]  [address] [place] [address]   [phone]            Jul 19, 2018  8:00 AM CDT   (Arrive by 7:45 AM)   Return Visit with Romy Kim MD   [facility] Dermatology ([facility])    [address] [place] [id number] [phone]            Jul 25, 2018  9:00 AM CDT   (Arrive by 8:45 AM)   RETURN FOOT/ANKLE with Zhane Blanc PA-C   [facility] ([facility])    [address] [place] [address]  [address]th [facility] [address] [phone]            Jul 27, 2018 12:00 PM CDT   (Arrive by 11:45 AM)   New Allergy with Owen Hurt MD   [facility]at[facility] ([facility])    [address] [place] [id number] [phone]            Jul 27, 2018  1:40 PM CDT   (Arrive by 1:25 PM)   NEW GENERAL  with Blaise Salas OD   [facility] ([facility] [place])    [address]  4th Floor  [facility] [address] [phone]            Jul 31, 2018  6:50 PM CDT   (Arrive by 6:35 PM)   RETURN FOOT/ANKLE with Alvrao Gautam MD   [facility] ([facility] [place])    [address]  4th Floor  [facility] [address] [phone]            Aug 02, 2018  9:00 AM CDT   (Arrive by 8:45 AM)   Return Visit with Ewelina Chen MD   [facility] ([facility] [place] [facility])    [address]  [address]  [facility] [address] [phone]            Sep 20, 2018  3:00 PM CDT   (Arrive by 2:45 PM)   RETURN DIABETES with Arabella Kamara PA-C   [facility] ([facility] [place] [facility])    [address]  3rd Floor  [facility] [address] [phone]            Jan 22, 2019  4:30 PM CST   (Arrive by 4:15 PM)   Return Visit with Ravin King MD   [facility] ([facility] [place] [facility])    [address]  [address]  [facility] [id number] [phone]              Who to contact     Please call your clinic at [phone] to:    Ask questions about your health    Make or cancel appointments    Discuss your medicines    Learn about your test results    Speak to your doctor            Additional Information About Your Visit        Care EveryWhere ID     This is your Care EveryWhere ID. This could be used by other organizations to access your [facility] medical records  [id number]        Your Vitals Were     Pulse                   59            Blood Pressure from Last 3 Encounters:   07/18/18 180/70   07/14/18 138/78   07/12/18 174/72    Weight from Last 3 Encounters:   07/14/18 86.6 kg (191 lb)   07/12/18 87.2 kg (192 lb 3.9 oz)   06/25/18 86.6 kg (191 lb)              Today, you had the following     No  orders found for display       Primary Care Provider Office Phone # Fax #    Noam Luis Jackson [phone]       [address] [address]        Equal Access to Services     KALYANI WARREN : Emeterio danisha jacobson xin Soaleksandar, waaxda luqadaha, qaybta kaalmada adetereyada, madeline marin indigo chaudhari. So [facility] [phone].    ATENCIÓN: Si habla español, tiene a santoro disposición servicios gratuitos de asistencia lingüística. Llame al [phone].    We comply with applicable federal civil rights laws and [place] laws. We do not discriminate on the basis of race, color, national origin, age, disability, sex, sexual orientation, or gender identity.            Thank you!     Thank you for choosing [facility]  for your care. Our goal is always to provide you with excellent care. Hearing back from our patients is one way we can continue to improve our services. Please take a few minutes to complete the written survey that you may receive in the mail after your visit with us. Thank you!             Your Updated Medication List - Protect others around you: Learn how to safely use, store and throw away your medicines at www.disposemymeds.org.          This list is accurate as of 7/18/18  1:20 PM.  Always use your most recent med list.                   Brand Name Dispense Instructions for use Diagnosis    ACE/ARB/ARNI NOT PRESCRIBED (INTENTIONAL)      Please choose reason not prescribed, below    CKD (chronic kidney disease) stage 5, GFR less than 15 ml/min (H)       acetaminophen 325 MG tablet    TYLENOL    100 tablet    Take 2 tablets (650 mg) by mouth every 4 hours as needed for mild pain    Diabetic ulcer of toe of right foot associated with type 2 diabetes mellitus, with other ulcer severity (H)       albuterol 108 (90 Base) MCG/ACT Inhaler    PROAIR HFA/PROVENTIL HFA/VENTOLIN HFA    3 Inhaler    Inhale 2 puffs into the lungs every 6 hours as needed for  shortness of breath / dyspnea or wheezing    Cough       ampicillin-sulbactam 3 (2-1) g Solr vial    UNASYN    82 each    Inject 3 g into the vein every 12 hours    Diabetic ulcer of toe of right foot associated with type 2 diabetes mellitus, with other ulcer severity (H)       atorvastatin 10 MG tablet    LIPITOR    30 tablet    Take 1 tablet (10 mg) by mouth daily    Hyperlipidemia LDL goal <100       blood glucose monitoring test strip    ONETOUCH ULTRA    200 strip    Test your blood sugar 3-4 times per day.    Type 2 diabetes mellitus with hyperglycemia, with long-term current use of insulin (H)       calcitRIOL 0.25 MCG capsule    ROCALTROL    30 capsule    TAKE 1 CAPSULE (0.25 MCG) BY MOUTH DAILY    Vitamin D deficiency       carvedilol 12.5 MG tablet    COREG    60 tablet    Take 1 tablet (12.5 mg) by mouth 2 times daily (with meals)    Essential hypertension with goal blood pressure less than 140/90       clindamycin 1 % lotion    CLEOCIN T    60 mL    Apply topically 2 times daily    Intertrigo       furosemide 40 MG tablet    LASIX    30 tablet    Take 1 tablet (40 mg) by mouth 2 times daily    Lymphedema of both lower extremities       insulin glargine 100 UNIT/ML injection    LANTUS    3 mL    Inject 28 Units Subcutaneous At Bedtime    Type 2 diabetes mellitus with diabetic neuropathy, with long-term current use of insulin (H)       insulin pen needle 31G X 6 MM    ULTICARE MINI    100 each    Use daily or as directed.    Type 2 diabetes, HbA1c goal < 7% (H)       methylPREDNISolone 4 MG tablet    MEDROL DOSEPAK    21 tablet    Follow package instructions        NovoLOG FLEXPEN 100 UNIT/ML injection   Generic drug:  insulin aspart     15 mL    Inject 7units SQ with breakfast, lunch and dinner.    Type 2 diabetes mellitus with hyperglycemia, with long-term current use of insulin (H)       order for DME     1 Device    Equipment being ordered: Compression stockings, 20-30 MMHG, knee high    Edema,  Hypertension goal BP (blood pressure) < 140/90       * order for DME     2 Device    Equipment being ordered: TEDS stocking  Below the knee 15-20 mg Dispense 2 Use daily    Localized edema       * order for DME     1 Device    1 wheelchair    Traumatic amputation of lower extremity above knee, unspecified laterality, subsequent encounter (H), CKD (chronic kidney disease) stage 3, GFR 30-59 ml/min, Type 2 diabetes mellitus with stage 3 chronic kidney disease, without long-term current use of insulin (H)       * order for DME     1 Units    Equipment being ordered: Right Lower extremity Solaris Ready wrap calf piece:  Size small/length tall , knee piece: Size small , Thigh piece size small/length average.    Edema of lower extremity       order for DME     1 Device    1 SAD light    Seasonal affective disorder (H)       sertraline 50 MG tablet    ZOLOFT    30 tablet    Take 1/2 tablet (25 mg) for 1-2 weeks, then increase to 1 tablet orally daily    Acute reaction to stress       Urea 20 % Crea cream     85 g    Apply topically daily    Xerosis cutis, Tyloma       vitamin D 2000 units tablet     100 tablet    Take 2,000 Units by mouth daily    Vitamin D deficiency       * Notice:  This list has 3 medication(s) that are the same as other medications prescribed for you. Read the directions carefully, and ask your doctor or other care provider to review them with you.

## 2018-07-18 NOTE — NURSING NOTE
Chief Complaint   Patient presents with     RECHECK     CKD      /74  Pulse 57  Temp 98.3  F (36.8  C) (Oral)  SpO2 99%  Gerardo Lopez, CMA
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
## 2018-07-18 NOTE — LETTER
July 24, 2018         TO: Supriya Herr  [address]  [facility] [address]         Dear Ms. Herr,    We are writing to inform you of your test results. Labs as discussed, GFR 11.        Resulted Orders   Renal panel   Result Value Ref Range    Sodium 142 133 - 144 mmol/L    Potassium 4.2 3.4 - 5.3 mmol/L    Chloride 111 (H) 94 - 109 mmol/L    Carbon Dioxide 23 20 - 32 mmol/L    Anion Gap 9 3 - 14 mmol/L    Glucose 129 (H) 70 - 99 mg/dL    Urea Nitrogen 91 (H) 7 - 30 mg/dL    Creatinine 3.95 (H) 0.52 - 1.04 mg/dL    GFR Estimate 11 (L) >60 mL/min/1.7m2      Comment:      Non  GFR Calc    GFR Estimate If Black 14 (L) >60 mL/min/1.7m2      Comment:       GFR Calc    Calcium 8.4 (L) 8.5 - 10.1 mg/dL    Phosphorus 4.0 2.5 - 4.5 mg/dL    Albumin 2.6 (L) 3.4 - 5.0 g/dL   Hemoglobin   Result Value Ref Range    Hemoglobin 9.8 (L) 11.7 - 15.7 g/dL   IRON AND IRON BINDING CAPACITY   Result Value Ref Range    Iron 76 35 - 180 ug/dL    Iron Binding Cap 196 (L) 240 - 430 ug/dL    Iron Saturation Index 39 15 - 46 %   FERRITIN   Result Value Ref Range    Ferritin 450 (H) 8 - 252 ng/mL       It was a pleasure to see you at your recent visit. Please contact us at [phone] if you have any questions or concerns. Thank you, we look forward to seeing you at your next visit.       Sincerely,    Kathryn Basilio MD.   of Medicine  Division of Kidney Disease and Hypertension  [address] [address]

## 2018-07-19 ENCOUNTER — OFFICE VISIT (OUTPATIENT)
Dept: DERMATOLOGY | Facility: CLINIC | Age: 68
End: 2018-07-19
Payer: MEDICARE

## 2018-07-19 DIAGNOSIS — L27.0 DRUG RASH: Primary | ICD-10-CM

## 2018-07-19 DIAGNOSIS — L30.4 INTERTRIGO: ICD-10-CM

## 2018-07-19 RX ORDER — TRIAMCINOLONE ACETONIDE 1 MG/G
OINTMENT TOPICAL 2 TIMES DAILY PRN
Qty: 453.6 G | Refills: 0 | Status: ON HOLD | OUTPATIENT
Start: 2018-07-19 | End: 2018-09-26

## 2018-07-19 ASSESSMENT — PAIN SCALES - GENERAL: PAINLEVEL: NO PAIN (0)

## 2018-07-19 NOTE — NURSING NOTE
Dermatology Rooming Note    Supriya Herr's goals for this visit include:   Chief Complaint   Patient presents with     Derm Problem     Supriya is here for an ER follow up. She has a generalized rash. It started abotu 2 weeks ago. It started at the IV site.      Yoon Garcia LPN

## 2018-07-19 NOTE — PATIENT INSTRUCTIONS
"Pediatric Dermatology  [facility]  [address]  [place] [address]  [phone]    Gentle Skin Care  Below is a list of products our providers recommend for gentle skin care.  Moisturizers:    Lighter; Cetaphil Cream, CeraVe, Aveeno and Vanicream Light     Thicker; Aquaphor Ointment, Vaseline, Petrolium Jelly, Eucerin and Vanicream    Avoid Lotions (too thin)  Mild Cleansers:    Dove- Fragrance Free    CeraVe     Vanicream Cleansing Bar    Cetaphil Cleanser     Aquaphor 2 in1 Gentle Wash and Shampoo       Laundry Products:    All Free and Clear    Cheer Free    Generic Brands are okay as long as they are  Fragrance Free      Avoid fabric softeners  and dryer sheets   Sunscreens: SPF 30 or greater     Sunscreens that contain Zinc Oxide or Titanium Dioxide should be applied, these are physical blockers. Spray or  chemical  sunscreens should be avoided.        Shampoo and Conditioners:    Free and Clear by Vanicream    Aquaphor 2 in 1 Gentle Wash and Shampoo    California Baby  super sensitive   Oils:    Mineral Oil     Emu Oil     For some patients, coconut and sunflower seed oil      Generic Products are an okay substitute, but make sure they are fragrance free.  *Avoid product that have fragrance added to them. Organic does not mean  fragrance free.  In fact patients with sensitive skin can become quite irritated by organic products.     1. Daily bathing is recommended. Make sure you are applying a good moisturizer after bathing every time.  2. Use Moisturizing creams at least twice daily to the whole body. Your provider may recommend a lighter or heavier moisturizer based on your child s severity and that time of year it is.  3. Creams are more moisturizing than lotions  4. Products should be fragrance free- soaps, creams, detergents.  Products such as Tom and Tom as well as the Cetaphil \"Baby\" line contain fragrance and may irritate your child's sensitive skin.    Care " Plan:  1. Keep bathing and showering short, less than 15 minutes   2. Always use lukewarm warm when possible. AVOID very HOT or COLD water  3. DO NOT use bubble bath  4. Limit the use of soaps. Focus on the skin folds, face, armpits, groin and feet  5. Do NOT vigorously scrub when you cleanse your skin  6. After bathing, PAT your skin lightly with a towel. DO NOT rub or scrub when drying  7. ALWAYS apply a moisturizer immediately after bathing. This helps to  lock in  the moisture. * IF YOU WERE PRESCRIBED A TOPICAL MEDICATION, APPLY YOUR MEDICATION FIRST THEN COVER WITH YOUR DAILY MOISTURIZER  8. Reapply moisturizing agents at least twice daily to your whole body  9. Do not use products such as powders, perfumes, or colognes on your skin  10. Avoid saunas and steam baths. This temperature is too HOT  11. Avoid tight or  scratchy  clothing such as wool  12. Always wash new clothing before wearing them for the first time  13. Sometimes a humidifier or vaporizer can be used at night can help the dry skin. Remember to keep it clean to avoid mold growth.
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
## 2018-07-19 NOTE — PROGRESS NOTES
This is a recent snapshot of the patient's [facility] medical record.  For current drug dose and complete information and questions, call [phone] or In Basket pool, [facility] ([id number])  CSN Number:  [id number]

## 2018-07-19 NOTE — MR AVS SNAPSHOT
After Visit Summary   7/19/2018    Supriya Herr    MRN: [id number]           Patient Information     Date Of Birth          1950        Visit Information        Provider Department      7/19/2018 8:00 AM Romy Kim MD [facility] Dermatology        Today's Diagnoses     Drug rash    -  1      Care Instructions    Pediatric Dermatology  [facility]  [address] [place] [address] [address]  [place] [address]  [phone]    Gentle Skin Care  Below is a list of products our providers recommend for gentle skin care.  Moisturizers:    Lighter; Cetaphil Cream, CeraVe, Aveeno and Vanicream Light     Thicker; Aquaphor Ointment, Vaseline, Petrolium Jelly, Eucerin and Vanicream    Avoid Lotions (too thin)  Mild Cleansers:    Dove- Fragrance Free    CeraVe     Vanicream Cleansing Bar    Cetaphil Cleanser     Aquaphor 2 in1 Gentle Wash and Shampoo       Laundry Products:    All Free and Clear    Cheer Free    Generic Brands are okay as long as they are  Fragrance Free      Avoid fabric softeners  and dryer sheets   Sunscreens: SPF 30 or greater     Sunscreens that contain Zinc Oxide or Titanium Dioxide should be applied, these are physical blockers. Spray or  chemical  sunscreens should be avoided.        Shampoo and Conditioners:    Free and Clear by Vanicream    Aquaphor 2 in 1 Gentle Wash and Shampoo    California Baby  super sensitive   Oils:    Mineral Oil     Emu Oil     For some patients, coconut and sunflower seed oil      Generic Products are an okay substitute, but make sure they are fragrance free.  *Avoid product that have fragrance added to them. Organic does not mean  fragrance free.  In fact patients with sensitive skin can become quite irritated by organic products.     1. Daily bathing is recommended. Make sure you are applying a good moisturizer after bathing every time.  2. Use Moisturizing creams at least twice daily to the whole body. Your provider may recommend a lighter or  "heavier moisturizer based on your child s severity and that time of year it is.  3. Creams are more moisturizing than lotions  4. Products should be fragrance free- soaps, creams, detergents.  Products such as Tom and Tom as well as the Cetaphil \"Baby\" line contain fragrance and may irritate your child's sensitive skin.    Care Plan:  1. Keep bathing and showering short, less than 15 minutes   2. Always use lukewarm warm when possible. AVOID very HOT or COLD water  3. DO NOT use bubble bath  4. Limit the use of soaps. Focus on the skin folds, face, armpits, groin and feet  5. Do NOT vigorously scrub when you cleanse your skin  6. After bathing, PAT your skin lightly with a towel. DO NOT rub or scrub when drying  7. ALWAYS apply a moisturizer immediately after bathing. This helps to  lock in  the moisture. * IF YOU WERE PRESCRIBED A TOPICAL MEDICATION, APPLY YOUR MEDICATION FIRST THEN COVER WITH YOUR DAILY MOISTURIZER  8. Reapply moisturizing agents at least twice daily to your whole body  9. Do not use products such as powders, perfumes, or colognes on your skin  10. Avoid saunas and steam baths. This temperature is too HOT  11. Avoid tight or  scratchy  clothing such as wool  12. Always wash new clothing before wearing them for the first time  13. Sometimes a humidifier or vaporizer can be used at night can help the dry skin. Remember to keep it clean to avoid mold growth.            Follow-ups after your visit        Your next 10 appointments already scheduled     Jul 25, 2018  9:00 AM CDT   (Arrive by 8:45 AM)   RETURN FOOT/ANKLE with Zhane Blanc PA-C   [facility] ([facility])    [address] [place] [id number] [phone]            Jul 27, 2018  1:40 PM CDT   (Arrive by 1:25 PM)   NEW GENERAL with Blaise Salas OD   [facility] ([facility])    [address] [place] [address] " [id number] [phone]            Jul 31, 2018  6:50 PM CDT   (Arrive by 6:35 PM)   RETURN FOOT/ANKLE with Alvaro Gautam MD   [facility] ([facility])    [address]  4th Floor  [facility] [id number] [phone]            Aug 02, 2018  9:00 AM CDT   (Arrive by 8:45 AM)   Return Visit with Ewelina Chen MD   [facility] ([facility])    [address] [place] [address]  [address]  [facility] [id number] [phone]            Sep 20, 2018  3:00 PM CDT   (Arrive by 2:45 PM)   RETURN DIABETES with Arabella Kamara PA-C   [facility] ([facility])    [address] [place] [address]  3rd Floor  [facility] [id number] [phone]            Jan 22, 2019  3:20 PM CST   CT CHEST W/O CONTRAST with UCCT2   [facility] ([facility])    [address] [place] [address]  1st Floor  [facility] [id number] [phone]           Please bring any scans or X-rays taken at other hospitals, if similar tests were done. Also bring a list of your medicines, including vitamins, minerals and over-the-counter drugs. It is safest to leave personal items at home.  Be sure to tell your doctor:   If you have any allergies.   If there s any chance you are pregnant.   If you are breastfeeding.  You do not need to do anything special to prepare for this exam.  Please wear loose clothing, such as a sweat suit or jogging clothes. Avoid snaps, zippers and other metal. We may ask you to undress and put on a hospital gown.            Jan 22, 2019  4:30 PM CST   (Arrive by 4:15 PM)   Return Visit with Ravin King MD   [facility] ([facility])    [address]  [address]  [facility] [id number] [phone]              Who to contact     Please call your clinic at [phone] to:    Ask questions about  your health    Make or cancel appointments    Discuss your medicines    Learn about your test results    Speak to your doctor            Additional Information About Your Visit        Care EveryWhere ID     This is your Care EveryWhere ID. This could be used by other organizations to access your [facility] medical records  [id number]         Blood Pressure from Last 3 Encounters:   07/18/18 177/74   07/18/18 180/70   07/14/18 138/78    Weight from Last 3 Encounters:   07/14/18 86.6 kg (191 lb)   07/12/18 87.2 kg (192 lb 3.9 oz)   06/25/18 86.6 kg (191 lb)              Today, you had the following     No orders found for display         Today's Medication Changes          These changes are accurate as of 7/19/18  8:46 AM.  If you have any questions, ask your nurse or doctor.               Start taking these medicines.        Dose/Directions    triamcinolone 0.1 % ointment   Commonly known as:  KENALOG   Used for:  Drug rash   Started by:  Romy Kim MD        Apply topically 2 times daily as needed (itching) To body except for face, groin, and armpits   Quantity:  453.6 g   Refills:  0            Where to get your medicines      These medications were sent to [id number] - [place], [place] - [address]  [address] [address]     Phone:  [phone]     triamcinolone 0.1 % ointment                Primary Care Provider Office Phone # Fax #    Noam Luis Jackson [phone]       [address] [address]        Equal Access to Services     BLAIR WARREN AH: Hadii danisha jacobson hadmicaelao Soaleksandar, waaxda luqadaha, qaybta kaalmada adeegmanfred, madeline chaudhari. So [facility] [phone].    ATENCIÓN: Si habla español, tiene a santoro disposición servicios gratuitos de asistencia lingüística. Llame al [phone].    We comply with applicable federal civil rights laws and [place] laws. We do not discriminate on the basis of race,  color, national origin, age, disability, sex, sexual orientation, or gender identity.            Thank you!     Thank you for choosing [facility] DERMATOLOG[facility]  for your care. Our goal is always to provide you with excellent care. Hearing back from our patients is one way we can continue to improve our services. Please take a few minutes to complete the written survey that you may receive in the mail after your visit with us. Thank you!             Your Updated Medication List - Protect others around you: Learn how to safely use, store and throw away your medicines at www.disposemymeds.org.          This list is accurate as of 7/19/18  8:46 AM.  Always use your most recent med list.                   Brand Name Dispense Instructions for use Diagnosis    ACE/ARB/ARNI NOT PRESCRIBED (INTENTIONAL)      Please choose reason not prescribed, below    CKD (chronic kidney disease) stage 5, GFR less than 15 ml/min (H)       acetaminophen 325 MG tablet    TYLENOL    100 tablet    Take 2 tablets (650 mg) by mouth every 4 hours as needed for mild pain    Diabetic ulcer of toe of right foot associated with type 2 diabetes mellitus, with other ulcer severity (H)       albuterol 108 (90 Base) MCG/ACT Inhaler    PROAIR HFA/PROVENTIL HFA/VENTOLIN HFA    3 Inhaler    Inhale 2 puffs into the lungs every 6 hours as needed for shortness of breath / dyspnea or wheezing    Cough       ampicillin-sulbactam 3 (2-1) g Solr vial    UNASYN    82 each    Inject 3 g into the vein every 12 hours    Diabetic ulcer of toe of right foot associated with type 2 diabetes mellitus, with other ulcer severity (H)       atorvastatin 10 MG tablet    LIPITOR    30 tablet    Take 1 tablet (10 mg) by mouth daily    Hyperlipidemia LDL goal <100       BENADRYL PO      Take 25 mg by mouth        blood glucose monitoring test strip    ONETOUCH ULTRA    200 strip    Test your blood sugar 3-4 times per day.    Type 2 diabetes mellitus with hyperglycemia, with long-term  current use of insulin (H)       calcitRIOL 0.25 MCG capsule    ROCALTROL    30 capsule    TAKE 1 CAPSULE (0.25 MCG) BY MOUTH DAILY    Vitamin D deficiency       carvedilol 12.5 MG tablet    COREG    60 tablet    Take 1 tablet (12.5 mg) by mouth 2 times daily (with meals)    Essential hypertension with goal blood pressure less than 140/90       clindamycin 1 % lotion    CLEOCIN T    60 mL    Apply topically 2 times daily    Intertrigo       furosemide 40 MG tablet    LASIX    30 tablet    Take 1 tablet (40 mg) by mouth 2 times daily    Lymphedema of both lower extremities       insulin glargine 100 UNIT/ML injection    LANTUS    3 mL    Inject 28 Units Subcutaneous At Bedtime    Type 2 diabetes mellitus with diabetic neuropathy, with long-term current use of insulin (H)       insulin pen needle 31G X 6 MM    ULTICARE MINI    100 each    Use daily or as directed.    Type 2 diabetes, HbA1c goal < 7% (H)       methylPREDNISolone 4 MG tablet    MEDROL DOSEPAK    21 tablet    Follow package instructions        NovoLOG FLEXPEN 100 UNIT/ML injection   Generic drug:  insulin aspart     15 mL    Inject 7units SQ with breakfast, lunch and dinner.    Type 2 diabetes mellitus with hyperglycemia, with long-term current use of insulin (H)       order for DME     1 Device    Equipment being ordered: Compression stockings, 20-30 MMHG, knee high    Edema, Hypertension goal BP (blood pressure) < 140/90       * order for DME     2 Device    Equipment being ordered: TEDS stocking  Below the knee 15-20 mg Dispense 2 Use daily    Localized edema       * order for DME     1 Device    1 wheelchair    Traumatic amputation of lower extremity above knee, unspecified laterality, subsequent encounter (H), CKD (chronic kidney disease) stage 3, GFR 30-59 ml/min, Type 2 diabetes mellitus with stage 3 chronic kidney disease, without long-term current use of insulin (H)       * order for DME     1 Units    Equipment being ordered: Right Lower  extremity Solaris Ready wrap calf piece:  Size small/length tall , knee piece: Size small , Thigh piece size small/length average.    Edema of lower extremity       order for DME     1 Device    1 SAD light    Seasonal affective disorder (H)       sertraline 50 MG tablet    ZOLOFT    30 tablet    Take 1/2 tablet (25 mg) for 1-2 weeks, then increase to 1 tablet orally daily    Acute reaction to stress       triamcinolone 0.1 % ointment    KENALOG    453.6 g    Apply topically 2 times daily as needed (itching) To body except for face, groin, and armpits    Drug rash       Urea 20 % Crea cream     85 g    Apply topically daily    Xerosis cutis, Tyloma       vitamin D 2000 units tablet     100 tablet    Take 2,000 Units by mouth daily    Vitamin D deficiency       * Notice:  This list has 3 medication(s) that are the same as other medications prescribed for you. Read the directions carefully, and ask your doctor or other care provider to review them with you.
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
## 2018-07-20 ENCOUNTER — TELEPHONE (OUTPATIENT)
Dept: FAMILY MEDICINE | Facility: CLINIC | Age: 68
End: 2018-07-20

## 2018-07-20 DIAGNOSIS — S78.111D: Primary | ICD-10-CM

## 2018-07-20 NOTE — TELEPHONE ENCOUNTER
Reason for call:  Order   Order or referral being requested: toilet riser  Reason for request: for safety on and off the toilet, lifetime need  Date needed: as soon as possible  Has the patient been seen by the PCP for this problem? Not Applicable    Additional comments: please include dx, any of dx should suffice    Phone number to reach patient:  Home number on file [phone] (home)    Best Time:  n/a    Can we leave a detailed message on this number?  YES

## 2018-07-21 DIAGNOSIS — Z79.4 TYPE 2 DIABETES MELLITUS WITH DIABETIC NEUROPATHY, WITH LONG-TERM CURRENT USE OF INSULIN (H): ICD-10-CM

## 2018-07-21 DIAGNOSIS — E11.40 TYPE 2 DIABETES MELLITUS WITH DIABETIC NEUROPATHY, WITH LONG-TERM CURRENT USE OF INSULIN (H): ICD-10-CM

## 2018-07-23 ENCOUNTER — HOME INFUSION (PRE-WILLOW HOME INFUSION) (OUTPATIENT)
Dept: PHARMACY | Facility: CLINIC | Age: 68
End: 2018-07-23

## 2018-07-23 NOTE — TELEPHONE ENCOUNTER
Left VM for Ramila OT [facility]    Order has been signed and faxed    No further action needed    Viridiana Sprague RN   [facility]

## 2018-07-23 NOTE — TELEPHONE ENCOUNTER
"Requested Prescriptions   Pending Prescriptions Disp Refills     BASAGLAR 100 UNIT/ML injection [Pharmacy Med Name: BASAGLAR 100 UNIT/ML KWIKPEN]  1    insulin glargine (LANTUS SOLOSTAR) 100 UNIT/ML pen  Last Written Prescription Date:  07/17/2018  Last Fill Quantity: 3,  # refills: 1   Last office visit: 7/3/2018 with prescribing provider:  07/03/2018   Future Office Visit:     Sig: INJECT 25 UNITS SUBCUTANEOUS AT BEDTIME    Long Acting Insulin Protocol Failed    7/21/2018 10:56 AM       Failed - Blood pressure less than 140/90 in past 6 months    BP Readings from Last 3 Encounters:   07/18/18 177/74   07/18/18 180/70   07/14/18 138/78                Passed - LDL on file in past 12 months    Recent Labs   Lab Test  03/29/18   1410   LDL  108*            Passed - Microalbumin on file in past 12 months    Recent Labs   Lab Test  01/09/18   1041   MICROL  2880   UMALCR  6037.74*            Passed - Serum creatinine on file in past 12 months    Recent Labs   Lab Test  07/18/18   1417   CR  3.95*            Passed - HgbA1C in past 3 or 6 months    If HgbA1C is 8 or greater, it needs to be on file within the past 3 months.  If less than 8, must be on file within the past 6 months.     Recent Labs   Lab Test  06/22/18   1306  12/22/17   A1C  6.0*   < >   --    HEMOGLOBINA1   --    --   6.8*    < > = values in this interval not displayed.            Passed - Patient is age 18 or older       Passed - Recent (6 mo) or future (30 days) visit within the authorizing provider's specialty    Patient had office visit in the last 6 months or has a visit in the next 30 days with authorizing provider or within the authorizing provider's specialty.  See \"Patient Info\" tab in inbasket, or \"Choose Columns\" in Meds & Orders section of the refill encounter.              "

## 2018-07-23 NOTE — TELEPHONE ENCOUNTER
Dr. Jackson    See message    DME cued    Viridiana Sprague, RN   [facility] [place]
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
## 2018-07-24 LAB
AST SERPL W P-5'-P-CCNC: 24 U/L (ref 0–45)
BASOPHILS # BLD AUTO: 0 10E9/L (ref 0–0.2)
BASOPHILS NFR BLD AUTO: 0.5 %
BUN SERPL-MCNC: 78 MG/DL (ref 7–30)
CREAT SERPL-MCNC: 3.69 MG/DL (ref 0.52–1.04)
CRP SERPL-MCNC: 10.2 MG/L (ref 0–8)
DIFFERENTIAL METHOD BLD: ABNORMAL
EOSINOPHIL # BLD AUTO: 0.4 10E9/L (ref 0–0.7)
EOSINOPHIL NFR BLD AUTO: 6.8 %
ERYTHROCYTE [DISTWIDTH] IN BLOOD BY AUTOMATED COUNT: 12.7 % (ref 10–15)
ERYTHROCYTE [SEDIMENTATION RATE] IN BLOOD BY WESTERGREN METHOD: 97 MM/H (ref 0–30)
GFR SERPL CREATININE-BSD FRML MDRD: 12 ML/MIN/1.7M2
HCT VFR BLD AUTO: 30.1 % (ref 35–47)
HGB BLD-MCNC: 9.7 G/DL (ref 11.7–15.7)
IMM GRANULOCYTES # BLD: 0 10E9/L (ref 0–0.4)
IMM GRANULOCYTES NFR BLD: 0.2 %
LYMPHOCYTES # BLD AUTO: 1.9 10E9/L (ref 0.8–5.3)
LYMPHOCYTES NFR BLD AUTO: 29.1 %
MCH RBC QN AUTO: 29.8 PG (ref 26.5–33)
MCHC RBC AUTO-ENTMCNC: 32.2 G/DL (ref 31.5–36.5)
MCV RBC AUTO: 93 FL (ref 78–100)
MONOCYTES # BLD AUTO: 0.6 10E9/L (ref 0–1.3)
MONOCYTES NFR BLD AUTO: 9 %
NEUTROPHILS # BLD AUTO: 3.5 10E9/L (ref 1.6–8.3)
NEUTROPHILS NFR BLD AUTO: 54.4 %
NRBC # BLD AUTO: 0 10*3/UL
NRBC BLD AUTO-RTO: 0 /100
PLATELET # BLD AUTO: 278 10E9/L (ref 150–450)
RBC # BLD AUTO: 3.25 10E12/L (ref 3.8–5.2)
WBC # BLD AUTO: 6.5 10E9/L (ref 4–11)

## 2018-07-24 PROCEDURE — 85652 RBC SED RATE AUTOMATED: CPT | Performed by: INTERNAL MEDICINE

## 2018-07-24 PROCEDURE — 82565 ASSAY OF CREATININE: CPT | Performed by: INTERNAL MEDICINE

## 2018-07-24 PROCEDURE — 85025 COMPLETE CBC W/AUTO DIFF WBC: CPT | Performed by: INTERNAL MEDICINE

## 2018-07-24 PROCEDURE — 86140 C-REACTIVE PROTEIN: CPT | Performed by: INTERNAL MEDICINE

## 2018-07-24 PROCEDURE — 84520 ASSAY OF UREA NITROGEN: CPT | Performed by: INTERNAL MEDICINE

## 2018-07-24 PROCEDURE — 84450 TRANSFERASE (AST) (SGOT): CPT | Performed by: INTERNAL MEDICINE

## 2018-07-24 RX ORDER — INSULIN GLARGINE 100 [IU]/ML
28 INJECTION, SOLUTION SUBCUTANEOUS AT BEDTIME
Qty: 3 ML | Refills: 1 | Status: SHIPPED | OUTPATIENT
Start: 2018-07-24 | End: 2018-08-14

## 2018-07-24 NOTE — TELEPHONE ENCOUNTER
Dr. Jackson,     Please review/sign or advise for refill of basaglar 100 unit/mL injection.     Routing because pt last on insulin glargine (lantus solostar) last singed by you on 07/03/18. Per [facility] pharmacy requesting basaglar for insurance purposes. Dose was increased by nurse from [facility] on 07/17/18 from 25 units to 28 units at HS. Cued for 28 units.    Ana Luisa Garcia RN  [facility]

## 2018-07-25 ENCOUNTER — TELEPHONE (OUTPATIENT)
Dept: INFECTIOUS DISEASES | Facility: CLINIC | Age: 68
End: 2018-07-25

## 2018-07-25 ENCOUNTER — HOME INFUSION (PRE-WILLOW HOME INFUSION) (OUTPATIENT)
Dept: PHARMACY | Facility: CLINIC | Age: 68
End: 2018-07-25

## 2018-07-26 ENCOUNTER — HOME INFUSION (PRE-WILLOW HOME INFUSION) (OUTPATIENT)
Dept: PHARMACY | Facility: CLINIC | Age: 68
End: 2018-07-26

## 2018-07-27 ENCOUNTER — OFFICE VISIT (OUTPATIENT)
Dept: OPHTHALMOLOGY | Facility: CLINIC | Age: 68
End: 2018-07-27
Payer: MEDICARE

## 2018-07-27 ENCOUNTER — ALLIED HEALTH/NURSE VISIT (OUTPATIENT)
Dept: PHARMACY | Facility: CLINIC | Age: 68
End: 2018-07-27
Payer: MEDICAID

## 2018-07-27 DIAGNOSIS — H35.413 BILATERAL RETINAL LATTICE DEGENERATION: ICD-10-CM

## 2018-07-27 DIAGNOSIS — I10 HYPERTENSION GOAL BP (BLOOD PRESSURE) < 140/90: Primary | ICD-10-CM

## 2018-07-27 DIAGNOSIS — Z96.1 PSEUDOPHAKIA OF LEFT EYE: ICD-10-CM

## 2018-07-27 DIAGNOSIS — H25.11 NUCLEAR SCLEROSIS OF RIGHT EYE: ICD-10-CM

## 2018-07-27 DIAGNOSIS — E11.3393 MODERATE NONPROLIFERATIVE DIABETIC RETINOPATHY OF BOTH EYES WITHOUT MACULAR EDEMA ASSOCIATED WITH TYPE 2 DIABETES MELLITUS (H): Primary | ICD-10-CM

## 2018-07-27 DIAGNOSIS — M86.9 OSTEOMYELITIS, UNSPECIFIED SITE, UNSPECIFIED TYPE (H): ICD-10-CM

## 2018-07-27 DIAGNOSIS — H52.13 MYOPIA OF BOTH EYES: ICD-10-CM

## 2018-07-27 DIAGNOSIS — Z79.4 TYPE 2 DIABETES MELLITUS WITH COMPLICATION, WITH LONG-TERM CURRENT USE OF INSULIN (H): ICD-10-CM

## 2018-07-27 DIAGNOSIS — Z86.69 HISTORY OF RETINAL TEAR: ICD-10-CM

## 2018-07-27 DIAGNOSIS — E11.8 TYPE 2 DIABETES MELLITUS WITH COMPLICATION, WITH LONG-TERM CURRENT USE OF INSULIN (H): ICD-10-CM

## 2018-07-27 DIAGNOSIS — M86.9 OSTEOMYELITIS (H): ICD-10-CM

## 2018-07-27 PROCEDURE — 99606 MTMS BY PHARM EST 15 MIN: CPT | Performed by: PHARMACIST

## 2018-07-27 PROCEDURE — 99607 MTMS BY PHARM ADDL 15 MIN: CPT | Performed by: PHARMACIST

## 2018-07-27 ASSESSMENT — REFRACTION_WEARINGRX
OS_SPHERE: -3.50
OD_CYLINDER: +2.25
OD_ADD: +2.75
OS_ADD: +2.75
OD_SPHERE: -4.75
OS_AXIS: 134
SPECS_TYPE: BIFOCAL
OS_CYLINDER: +2.00
OD_AXIS: 093

## 2018-07-27 ASSESSMENT — TONOMETRY
IOP_METHOD: ICARE
OD_IOP_MMHG: 10
OS_IOP_MMHG: 10

## 2018-07-27 ASSESSMENT — CUP TO DISC RATIO
OS_RATIO: 0.25
OD_RATIO: 0.3

## 2018-07-27 ASSESSMENT — REFRACTION_MANIFEST
OD_SPHERE: -4.75
OS_AXIS: 135
OS_SPHERE: -3.50
OS_ADD: +2.75
OD_AXIS: 093
OD_ADD: +2.75
OS_CYLINDER: +2.00
OD_CYLINDER: +1.75

## 2018-07-27 ASSESSMENT — EXTERNAL EXAM - RIGHT EYE: OD_EXAM: NORMAL

## 2018-07-27 ASSESSMENT — SLIT LAMP EXAM - LIDS
COMMENTS: NORMAL
COMMENTS: NORMAL

## 2018-07-27 ASSESSMENT — EXTERNAL EXAM - LEFT EYE: OS_EXAM: NORMAL

## 2018-07-27 ASSESSMENT — VISUAL ACUITY
METHOD: SNELLEN - LINEAR
OS_CC+: -3
OD_CC+: +1
OD_CC: 20/30
CORRECTION_TYPE: GLASSES
OS_CC: 20/25

## 2018-07-27 ASSESSMENT — CONF VISUAL FIELD
METHOD: COUNTING FINGERS
OD_NORMAL: 1
OS_NORMAL: 1

## 2018-07-27 NOTE — PROGRESS NOTES
SUBJECTIVE/OBJECTIVE:                Supriya Herr is a 68 year old female called for a follow up appointment.  She was discharged from [facility] on 6/28/18 for adverse drug effect from antibiotic treating Osteomyelitis.  Spoke with Caroline her daughter today per pt request.     Chief Complaint: Follow up from last visit: check BG and consider Losartan start.     Allergies/ADRs: None  Tobacco: 0-1 pack per day - is not interested in quitting sneaks in a cigarette in here and there, but unable to smoke where she is staying.   Alcohol: not currently using  PMH: Reviewed in Epic    Medication Adherence/Access:  Patient uses pill box(es) and has assistance with medication administration: family member, daughter.  Patient takes medications 2 time(s) per day.  Per patient, misses medication 1 times per week. Missed 1 evening in the past week. Been pretty consistent 95%. She has been sleeping in more recently, so her morning dose times varies.     Hypertension: Current medications include Carvedilol 12.5mg BID, Furosemide 40mg BID (does not weigh at home due to being in wheel chair), a little lymphoedema in leg, possibly due to infection.  Patient reports no current medication side effects. Blood sugars at home have been running from: 136/60, 132/67, 135/78. Had a high one in the office due to missing her morning pills. Dr. Basilio agrees a trial of low dose Losartan would be reasonable in the future. Pt states last clinic visit BP was high because she missed her morning medications.     Diabetes: Pt currently taking Lantus 25 units daily, Novolog 4 units with meals, 2 units before snacks. Pt is not experiencing side effects. Caroline believes she does not miss doses of insulin, but is unsure.   SMBG: three times daily.   Ranges (patient reported): Saw Arabellazaira Kamara in July.   Date FBG/ 2hours post Lunch/2hours post Dinner /2hours post Bedtime   7/26 104 126     7/25  128 /436 236   7/24 84 117     7/23  193 314    7/22 104 151 134     7/21 126  104    7/20 151 220     7/19 160 272 320        Date FBG/ 2hours post Lunch/2hours post Dinner /2hours post Bedtime   7/9 109      7/8 101 130 218    7/7 124 276 294    7/6 77 120 96    7/5  101 96    7/4 128 128 353 182   7/3 84  137    7/2 109 110 288    7/1 128 114 235 126   6/30 112  243    6/29 100   195   6/28 116 136 436      Patient is not experiencing hypoglycemia since being home.   Recent symptoms of high blood sugar? none  Eye exam: due. Last visit was 05/2017  Foot exam: up to date  Microalbumin is not < 30 mg/g. Pt is not taking an ACEi/ARB.  Aspirin: pt not taking  Diet/Exercise: Discussed with patient and daughter. Pt does have a penchant for sweets and snacking between meals. This may be contributing to her high dinner numbers.    Osteomyelitis :  Ertapenem daily. Switched from Unasyn due to rash with blistering on 7/12/18.     Current labs include:  BP Readings from Last 3 Encounters:   07/18/18 177/74   07/18/18 180/70   07/14/18 138/78     Today's Vitals: There were no vitals taken for this visit.  Lab Results   Component Value Date    A1C 6.0 06/22/2018   .  Lab Results   Component Value Date    CHOL 179 03/29/2018     Lab Results   Component Value Date    TRIG 131 03/29/2018     Lab Results   Component Value Date    HDL 45 03/29/2018     Lab Results   Component Value Date     03/29/2018       Liver Function Studies -   Recent Labs   Lab Test  06/24/18   0623   PROTTOTAL  6.2*   ALBUMIN  2.3*   BILITOTAL  0.2   ALKPHOS  49   AST  16   ALT  17       Lab Results   Component Value Date    UCRR 40 05/24/2018    MICROL 2880 01/09/2018    UMALCR 6037.74 (H) 01/09/2018       Last Basic Metabolic Panel:  Lab Results   Component Value Date     06/28/2018      Lab Results   Component Value Date    POTASSIUM 3.8 06/28/2018     Lab Results   Component Value Date    CHLORIDE 111 06/28/2018     Lab Results   Component Value Date    BUN 77 06/28/2018     Lab Results   Component Value  Date    CR 3.52 06/28/2018     GFR Estimate   Date Value Ref Range Status   07/24/2018 12 (L) >60 mL/min/1.7m2 Final     Comment:     Non  GFR Calc   07/18/2018 11 (L) >60 mL/min/1.7m2 Final     Comment:     Non  GFR Calc   07/10/2018 13 (L) >60 mL/min/1.7m2 Final     Comment:     Non  GFR Calc     Most Recent Immunizations   Administered Date(s) Administered     HepB-Adult 05/18/2018     Influenza (High Dose) 3 valent vaccine 09/21/2017     Influenza (IIV3) PF 10/25/2006     Influenza Vaccine IM 3yrs+ 4 Valent IIV4 01/21/2016     Influenza Vaccine, 3 YRS +, IM (QUADRIVALENT W/PRESERVATIVES) 11/21/2016     Pneumo Conj 13-V (2010&after) 08/13/2015     Pneumococcal 23 valent 08/27/2012     TD (ADULT, 7+) 02/08/2006     TDAP Vaccine (Adacel) 07/18/2013     Zoster vaccine, live 08/13/2015     ASSESSMENT:                 Current medications were reviewed today.      Medication Adherence: good, no issues identified    Hypertension: Stable. See diabetes discussion. BP not at goal.     Diabetes: Stable. Losartan low dose is indicated considering her proteinuria. I will hold off starting it until her infection resolves. Over the past year or so her Creatinine has increased from the 1.1-1.3 to 3.4-4. Will discuss again in September. Pt is following up with Arabella Kamara to manage BG.     Osteomyelitis : Stable.     PLAN:                We will consider starting Losartan 12.5mg at next visit.     I spent 20 minutes with this patient today. I offer these suggestions for consideration by the PCP. A copy of the visit note was provided to the patient's primary care provider.    Will follow up in 1.5 months.    The patient declined a summary of these recommendations as an after visit summary.    Brenden Blackwell, PharmD  [facility] Pharmacist    Phone: [phone]

## 2018-07-27 NOTE — MR AVS SNAPSHOT
After Visit Summary   7/27/2018    Supriya Herr    MRN: [id number]           Patient Information     Date Of Birth          1950        Visit Information        Provider Department      7/27/2018 9:30 AM Brenden Blackwell RPH [facility] Medication Therapy Management        Today's Diagnoses     Hypertension goal BP (blood pressure) < 140/90    -  1    Type 2 diabetes mellitus with complication, with long-term current use of insulin (H)        Osteomyelitis (H)        Osteomyelitis, unspecified site, unspecified type (H)           Follow-ups after your visit        Your next 10 appointments already scheduled     Jul 27, 2018  1:40 PM CDT   (Arrive by 1:25 PM)   NEW GENERAL with Blaise Salas OD   [facility] Ophthalmology ([facility] [place])    [address]  4th Floor  [facility] [id number] [phone]            Jul 31, 2018  6:50 PM CDT   (Arrive by 6:35 PM)   RETURN FOOT/ANKLE with Alvaro Gautam MD   [facility] ([facility] [place])    [address]  4th Floor  [facility] [id number] [phone]            Aug 02, 2018  9:00 AM CDT   (Arrive by 8:45 AM)   Return Visit with Ewelina Chen MD   [facility] ([facility])    [address] [place] [address]  [address]  [facility] [id number] [phone]            Aug 13, 2018  3:45 PM CDT   Return Visit with Kathryn Basilio MD   [facility] Renal ([facility])    [address] [place] [id number] [phone]            Sep 13, 2018  2:30 PM CDT   (Arrive by 2:15 PM)   Office Visit with Brenden Blackwell RPH   [facility] Medication Therapy Management ([facility])    [address] [place] [address]  3rd Floor  [facility] [id number] [phone]           Bring a current list of meds and any records pertaining to this visit. For Physicals,  please bring immunization records and any forms needing to be filled out. Please arrive 10 minutes early to complete paperwork.            Sep 13, 2018  3:30 PM CDT   Lab with  LAB   [facility] ([facility])    [address] [place] [address]  1st Floor  [facility] [id number] [phone]            Sep 13, 2018  4:30 PM CDT   (Arrive by 4:00 PM)   Return Visit with Kathryn Basilio MD   [facility] Nephrology ([facility])    [address] [place] [address]  [address]  [facility] [id number] [phone]            Sep 20, 2018  3:00 PM CDT   (Arrive by 2:45 PM)   RETURN DIABETES with Arabella Kamara PA-C   [facility] Endocrinology ([facility])    [address] [place] [address]  3rd Floor  [facility] [id number] [phone]            Jan 22, 2019  3:20 PM CST   CT CHEST W/O CONTRAST with UCCT2   [facility] ([facility])    [address] [place] [address]  1st Floor  [facility] [id number] [phone]           Please bring any scans or X-rays taken at other hospitals, if similar tests were done. Also bring a list of your medicines, including vitamins, minerals and over-the-counter drugs. It is safest to leave personal items at home.  Be sure to tell your doctor:   If you have any allergies.   If there s any chance you are pregnant.   If you are breastfeeding.  You do not need to do anything special to prepare for this exam.  Please wear loose clothing, such as a sweat suit or jogging clothes. Avoid snaps, zippers and other metal. We may ask you to undress and put on a hospital gown.            Jan 22, 2019  4:30 PM CST   (Arrive by 4:15 PM)   Return Visit with Ravin King MD   [facility] ([facility])    [address] [place] [address]  [address]  [facility] [id number] [phone]              Who to contact     If you have questions or need follow up information  about today's clinic visit or your schedule please contact [facility] MEDICATION THERAPY MANAGEMENT directly at [phone].  Normal or non-critical lab and imaging results will be communicated to you by MyChart, letter or phone within 4 business days after the clinic has received the results. If you do not hear from us within 7 days, please contact the clinic through MyChart or phone. If you have a critical or abnormal lab result, we will notify you by phone as soon as possible.  Submit refill requests through eCert or call your pharmacy and they will forward the refill request to us. Please allow 3 business days for your refill to be completed.          Additional Information About Your Visit        Care EveryWhere ID     This is your Care EveryWhere ID. This could be used by other organizations to access your [employer] medical records  [id number]         Blood Pressure from Last 3 Encounters:   07/18/18 177/74   07/18/18 180/70   07/14/18 138/78    Weight from Last 3 Encounters:   07/14/18 191 lb (86.6 kg)   07/12/18 192 lb 3.9 oz (87.2 kg)   06/25/18 191 lb (86.6 kg)              Today, you had the following     No orders found for display         Today's Medication Changes          These changes are accurate as of 7/27/18  9:59 AM.  If you have any questions, ask your nurse or doctor.               Stop taking these medicines if you haven't already. Please contact your care team if you have questions.     ampicillin-sulbactam 3 (2-1) g Solr vial   Commonly known as:  UNASYN   Stopped by:  Brenden Blackwell [facility]           calcitRIOL 0.25 MCG capsule   Commonly known as:  ROCALTROL   Stopped by:  Brenden Blackwell [facility]                    Primary Care Provider Office Phone # Fax #    Noam Jackson [phone]       [address] [place] [address]        Equal Access to Services     KALYANI WARREN : Emeterio Lam, kristin crocker, karina ko  madeline parisitere pittaan ah. So [facility] [phone].    ATENCIÓN: Si habla don, tiene a santoro disposición servicios gratuitos de asistencia lingüística. Renuka al [phone].    We comply with applicable federal civil rights laws and [place] laws. We do not discriminate on the basis of race, color, national origin, age, disability, sex, sexual orientation, or gender identity.            Thank you!     Thank you for choosing [facility] MEDICATION THERAPY MANAGEMENT  for your care. Our goal is always to provide you with excellent care. Hearing back from our patients is one way we can continue to improve our services. Please take a few minutes to complete the written survey that you may receive in the mail after your visit with us. Thank you!             Your Updated Medication List - Protect others around you: Learn how to safely use, store and throw away your medicines at www.disposemymeds.org.          This list is accurate as of 7/27/18  9:59 AM.  Always use your most recent med list.                   Brand Name Dispense Instructions for use Diagnosis    ACE/ARB/ARNI NOT PRESCRIBED (INTENTIONAL)      Please choose reason not prescribed, below    CKD (chronic kidney disease) stage 5, GFR less than 15 ml/min (H)       acetaminophen 325 MG tablet    TYLENOL    100 tablet    Take 2 tablets (650 mg) by mouth every 4 hours as needed for mild pain    Diabetic ulcer of toe of right foot associated with type 2 diabetes mellitus, with other ulcer severity (H)       albuterol 108 (90 Base) MCG/ACT Inhaler    PROAIR HFA/PROVENTIL HFA/VENTOLIN HFA    3 Inhaler    Inhale 2 puffs into the lungs every 6 hours as needed for shortness of breath / dyspnea or wheezing    Cough       atorvastatin 10 MG tablet    LIPITOR    30 tablet    Take 1 tablet (10 mg) by mouth daily    Hyperlipidemia LDL goal <100       BENADRYL PO      Take 25 mg by mouth        blood glucose monitoring test strip    ONETOUCH ULTRA     200 strip    Test your blood sugar 3-4 times per day.    Type 2 diabetes mellitus with hyperglycemia, with long-term current use of insulin (H)       carvedilol 12.5 MG tablet    COREG    60 tablet    Take 1 tablet (12.5 mg) by mouth 2 times daily (with meals)    Essential hypertension with goal blood pressure less than 140/90       clindamycin 1 % lotion    CLEOCIN T    60 mL    Apply topically 2 times daily    Intertrigo       FLORAJEN ACIDOPHILUS PO      Take 1 capsule by mouth daily        furosemide 40 MG tablet    LASIX    30 tablet    Take 1 tablet (40 mg) by mouth 2 times daily    Lymphedema of both lower extremities       * insulin glargine 100 UNIT/ML injection    LANTUS    3 mL    Inject 28 Units Subcutaneous At Bedtime    Type 2 diabetes mellitus with diabetic neuropathy, with long-term current use of insulin (H)       * BASAGLAR 100 UNIT/ML injection     3 mL    Inject 28 Units Subcutaneous At Bedtime    Type 2 diabetes mellitus with diabetic neuropathy, with long-term current use of insulin (H)       insulin pen needle 31G X 6 MM    ULTICARE MINI    100 each    Use daily or as directed.    Type 2 diabetes, HbA1c goal < 7% (H)       methylPREDNISolone 4 MG tablet    MEDROL DOSEPAK    21 tablet    Follow package instructions        NovoLOG FLEXPEN 100 UNIT/ML injection   Generic drug:  insulin aspart     15 mL    Inject 7units SQ with breakfast, lunch and dinner.    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, subsequent encounter (H)       sertraline 50 MG tablet    ZOLOFT    30 tablet    Take 1/2 tablet (25 mg) for 1-2 weeks, then increase to 1 tablet orally daily    Acute reaction to stress       triamcinolone 0.1 % ointment    KENALOG    453.6 g    Apply topically 2 times daily as needed (itching) To body except for face, groin, and armpits    Drug rash       Urea 20 % Crea cream     85 g    Apply topically daily    Xerosis cutis, Tyloma       vitamin D 2000 units tablet     100 tablet    Take 2,000 Units by mouth daily    Vitamin D deficiency       * Notice:  This list has 5 medication(s) that are the same as other medications prescribed for you. Read the directions carefully, and ask your doctor or other care provider to review them with you.

## 2018-07-27 NOTE — NURSING NOTE
Chief Complaints and History of Present Illnesses   Patient presents with     Diabetic Eye Exam     HPI    Affected eye(s):  Both   Symptoms:     No blurred vision   No floaters   No flashes         Do you have eye pain now?:  No      Comments:  Patient notes vision at near is not good, has to take gls off to see better.     Last BGL: 098 this morning  Last A1C: unsure  Lab Results       Component                Value               Date                       A1C                      6.0                 06/22/2018                 A1C                      5.4                 03/29/2018                 A1C                      6.8                 01/09/2018                 A1C                      9.6                 06/09/2017                 A1C                      6.9                 02/14/2017              Tyesha Nazario July 27, 2018 1:37 PM

## 2018-07-27 NOTE — PROGRESS NOTES
This is a recent snapshot of the patient's [facility] medical record.  For current drug dose and complete information and questions, call [phone] or In Basket pool, [facility] home infusion ([id number])  CSN Number:  [id number]

## 2018-07-27 NOTE — PROGRESS NOTES
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 Continue management as directed and return to clinic in 6 months for dilated exam, or sooner, as needed.    (H52.13) Myopia of both eyes  Comment: Myopia OU  Plan: REFRACTION [[id number]]         Educated patient on condition and clinical findings. Dispensed spectacle prescription for full time wear. Educated patient on possibility of adaptation period, if symptoms do not improve return to clinic for further testing.    (H25.11) Nuclear sclerosis of right eye  Comment: Not visually significant  Plan:  Monitor annually.    (Z96.1) Pseudophakia of left eye  Comment: Stable  Plan:  Monitor annually.    (Z86.69) History of retinal tear  Comment: Asymptomatic, stable  Plan:  Educated patient on signs and symptoms of retinal detachment including increase in flashes, floaters, or a change in vision. If symptoms present, return to clinic immediately.    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    Return to clinic in 6 months for dilated eye exam.    Complete documentation of historical and exam elements from today's encounter can  be found in the full encounter summary report (not reduplicated in this progress  note). I personally obtained the chief complaint(s) and history of present illness. I  confirmed and edited as necessary the review of systems, past medical/surgical  history, family history, social history, and examination findings as documented by  others; and I examined the patient myself. I personally reviewed the relevant tests,  images, and reports as documented above. I formulated and edited as necessary  the  assessment and plan and discussed the findings and management plan with the  patient and family.    Blaise Salas OD, FAAO

## 2018-07-27 NOTE — TELEPHONE ENCOUNTER
Called pt and spoke with pt's daughter about pt's rash. She reports the rash is not any worse and seems to be holding at this point. She reports they are not even using the benadryl or anti-itch ointment that they know they have for her, but will use it if they need to. She reports they are looking forward to seeing Dr Chen in clinic next week. Dr Chen updated.  Carly Coppola RN

## 2018-07-27 NOTE — MR AVS SNAPSHOT
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      Your next 10 appointments already scheduled     Jul 31, 2018  6:00 PM CDT   (Arrive by 5:45 PM)   RETURN FOOT/ANKLE with Alvaro Gautam MD   [facility] ([facility])    [address]  4th [facility] [id number] [phone]            Aug 02, 2018  9:00 AM CDT   (Arrive by 8:45 AM)   Return Visit with Ewelina Chen MD   [facility] ([facility])    [address] [place] [address]  [address]  [facility] [id number] [phone]            Aug 13, 2018  3:45 PM CDT   Return Visit with Kathryn Basilio MD   [facility] ([facility])    [address] [place] [id number] [phone]            Sep 13, 2018  2:30 PM CDT   (Arrive by 2:15 PM)   [facility]    [address] [place] [address]  3rd Floor  [facility] [id number] [phone]           Bring a current list of meds and any records pertaining to this visit. For Physicals, please bring immunization records and any forms needing to be filled out. Please arrive  10 minutes early to complete paperwork.            Sep 13, 2018  3:30 PM CDT   Lab with  LAB   [facility] ([facility])    [address]  1st Floor  [facility] [address] [phone]            Sep 13, 2018  4:30 PM CDT   (Arrive by 4:00 PM)   Return Visit with Kathryn Basilio MD   [facility] Nephrology ([facility])    [address]  [address]  [facility] [id number] [phone]            Sep 20, 2018  3:00 PM CDT   (Arrive by 2:45 PM)   RETURN DIABETES with Arabella Kamara PA-C   [facility] Endocrinology ([facility])    [address] [place] [address]  3rd Floor  [facility] [address] [phone]            Jan 22, 2019  3:20 PM CST   CT CHEST W/O CONTRAST with UCCT2   [facility] [place] [facility] ([facility])    [address]  1st Floor  [facility] [address] [phone]           Please bring any scans or X-rays taken at other hospitals, if similar tests were done. Also bring a list of your medicines, including vitamins, minerals and over-the-counter drugs. It is safest to leave personal items at home.  Be sure to tell your doctor:   If you have any allergies.   If there s any chance you are pregnant.   If you are breastfeeding.  You do not need to do anything special to prepare for this exam.  Please wear loose clothing, such as a sweat suit or jogging clothes. Avoid snaps, zippers and other metal. We may ask you to undress and put on a hospital gown.            Jan 22, 2019  4:30 PM CST   (Arrive by 4:15 PM)   Return Visit with Ravin King MD   [facility] ([facility])    [address]  [address]  [facility] [address] [phone]              Who to contact     Please call your clinic at [phone] to:    Ask questions about your health    Make or cancel appointments    Discuss your medicines    Learn  about your test results    Speak to your doctor            Additional Information About Your Visit        Care EveryWhere ID     This is your Care EveryWhere ID. This could be used by other organizations to access your [place] medical records  [id number]         Blood Pressure from Last 3 Encounters:   07/18/18 177/74   07/18/18 180/70   07/14/18 138/78    Weight from Last 3 Encounters:   07/14/18 86.6 kg (191 lb)   07/12/18 87.2 kg (192 lb 3.9 oz)   06/25/18 86.6 kg (191 lb)              We Performed the Following     REFRACTION [39716]          Today's Medication Changes          These changes are accurate as of 7/27/18 11:59 PM.  If you have any questions, ask your nurse or doctor.               Stop taking these medicines if you haven't already. Please contact your care team if you have questions.     ampicillin-sulbactam 3 (2-1) g Solr vial   Commonly known as:  UNASYN   Stopped by:  Brenden Blackwell [facility]           calcitRIOL 0.25 MCG capsule   Commonly known as:  ROCALTROL   Stopped by:  Brenden Blackwell [facility]                    Primary Care Provider Office Phone # Fax #    Noam Luis Jackson [phone]       [address] [place] [address]        Equal Access to Services     KALYANI WARREN AH: Hadii danisha ku hadasho Soomaali, waaxda luqadaha, qaybta kaalmada adeegyada, madeline pruitt hayamy sood . So [facility] [phone].    ATENCIÓN: Si habla español, tiene a santoro disposición servicios gratuitos de asistencia lingüística. Llame al [phone].    We comply with applicable federal civil rights laws and [place] laws. We do not discriminate on the basis of race, color, national origin, age, disability, sex, sexual orientation, or gender identity.            Thank you!     Thank you for choosing [facility]  for your care. Our goal is always to provide you with excellent care. Hearing back from our patients is one way we can continue to improve  our services. Please take a few minutes to complete the written survey that you may receive in the mail after your visit with us. Thank you!             Your Updated Medication List - Protect others around you: Learn how to safely use, store and throw away your medicines at www.disposemymeds.org.          This list is accurate as of 7/27/18 11:59 PM.  Always use your most recent med list.                   Brand Name Dispense Instructions for use Diagnosis    ACE/ARB/ARNI NOT PRESCRIBED (INTENTIONAL)      Please choose reason not prescribed, below    CKD (chronic kidney disease) stage 5, GFR less than 15 ml/min (H)       acetaminophen 325 MG tablet    TYLENOL    100 tablet    Take 2 tablets (650 mg) by mouth every 4 hours as needed for mild pain    Diabetic ulcer of toe of right foot associated with type 2 diabetes mellitus, with other ulcer severity (H)       albuterol 108 (90 Base) MCG/ACT Inhaler    PROAIR HFA/PROVENTIL HFA/VENTOLIN HFA    3 Inhaler    Inhale 2 puffs into the lungs every 6 hours as needed for shortness of breath / dyspnea or wheezing    Cough       atorvastatin 10 MG tablet    LIPITOR    30 tablet    Take 1 tablet (10 mg) by mouth daily    Hyperlipidemia LDL goal <100       BENADRYL PO      Take 25 mg by mouth        blood glucose monitoring test strip    ONETOUCH ULTRA    200 strip    Test your blood sugar 3-4 times per day.    Type 2 diabetes mellitus with hyperglycemia, with long-term current use of insulin (H)       carvedilol 12.5 MG tablet    COREG    60 tablet    Take 1 tablet (12.5 mg) by mouth 2 times daily (with meals)    Essential hypertension with goal blood pressure less than 140/90       clindamycin 1 % lotion    CLEOCIN T    60 mL    Apply topically 2 times daily    Intertrigo       FLORAJEN ACIDOPHILUS PO      Take 1 capsule by mouth daily        furosemide 40 MG tablet    LASIX    30 tablet    Take 1 tablet (40 mg) by mouth 2 times daily    Lymphedema of both lower extremities        * insulin glargine 100 UNIT/ML injection    LANTUS    3 mL    Inject 28 Units Subcutaneous At Bedtime    Type 2 diabetes mellitus with diabetic neuropathy, with long-term current use of insulin (H)       * BASAGLAR 100 UNIT/ML injection     3 mL    Inject 28 Units Subcutaneous At Bedtime    Type 2 diabetes mellitus with diabetic neuropathy, with long-term current use of insulin (H)       insulin pen needle 31G X 6 MM    ULTICARE MINI    100 each    Use daily or as directed.    Type 2 diabetes, HbA1c goal < 7% (H)       methylPREDNISolone 4 MG tablet    MEDROL DOSEPAK    21 tablet    Follow package instructions        NovoLOG FLEXPEN 100 UNIT/ML injection   Generic drug:  insulin aspart     15 mL    Inject 7units SQ with breakfast, lunch and dinner.    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    Edema of lower extremity       order for DME     1 Device    1 SAD light    Seasonal affective disorder (H)       order for DME     1 Device    Equipment being ordered: toilet riser, lifetime need DX amputation of leg above the knee, S78.119    Traumatic amputation  of right lower extremity above knee, subsequent encounter (H)       sertraline 50 MG tablet    ZOLOFT    30 tablet    Take 1/2 tablet (25 mg) for 1-2 weeks, then increase to 1 tablet orally daily    Acute reaction to stress       triamcinolone 0.1 % ointment    KENALOG    453.6 g    Apply topically 2 times daily as needed (itching) To body except for face, groin, and armpits    Drug rash       Urea 20 % Crea cream     85 g    Apply topically daily    Xerosis cutis, Tyloma       vitamin D 2000 units tablet     100 tablet    Take 2,000 Units by mouth daily    Vitamin D deficiency       * Notice:  This list has 5 medication(s) that are the same as other medications prescribed for you. Read the directions carefully, and ask your doctor or other care provider to review them with you.

## 2018-07-30 NOTE — PROGRESS NOTES
This is a recent snapshot of the patient's [facility] medical record.  For current drug dose and complete information and questions, call [phone] or In Basket pool, [facility] home infusion ([id number])  CSN Number:  [id number]

## 2018-07-31 ENCOUNTER — OFFICE VISIT (OUTPATIENT)
Dept: ORTHOPEDICS | Facility: CLINIC | Age: 68
End: 2018-07-31
Payer: MEDICARE

## 2018-07-31 ENCOUNTER — DOCUMENTATION ONLY (OUTPATIENT)
Dept: CARE COORDINATION | Facility: CLINIC | Age: 68
End: 2018-07-31

## 2018-07-31 DIAGNOSIS — M25.571 PAIN IN JOINT, ANKLE AND FOOT, RIGHT: Primary | ICD-10-CM

## 2018-07-31 LAB
AST SERPL W P-5'-P-CCNC: 24 U/L (ref 0–45)
BASOPHILS # BLD AUTO: 0.1 10E9/L (ref 0–0.2)
BASOPHILS NFR BLD AUTO: 1 %
BUN SERPL-MCNC: 63 MG/DL (ref 7–30)
CREAT SERPL-MCNC: 3.36 MG/DL (ref 0.52–1.04)
CRP SERPL-MCNC: <2.9 MG/L (ref 0–8)
DIFFERENTIAL METHOD BLD: ABNORMAL
EOSINOPHIL # BLD AUTO: 0.4 10E9/L (ref 0–0.7)
EOSINOPHIL NFR BLD AUTO: 8.3 %
ERYTHROCYTE [DISTWIDTH] IN BLOOD BY AUTOMATED COUNT: 12.8 % (ref 10–15)
ERYTHROCYTE [SEDIMENTATION RATE] IN BLOOD BY WESTERGREN METHOD: 84 MM/H (ref 0–30)
GFR SERPL CREATININE-BSD FRML MDRD: 14 ML/MIN/1.7M2
HCT VFR BLD AUTO: 29.1 % (ref 35–47)
HGB BLD-MCNC: 9.3 G/DL (ref 11.7–15.7)
IMM GRANULOCYTES # BLD: 0 10E9/L (ref 0–0.4)
IMM GRANULOCYTES NFR BLD: 0 %
LYMPHOCYTES # BLD AUTO: 1.3 10E9/L (ref 0.8–5.3)
LYMPHOCYTES NFR BLD AUTO: 25.3 %
MCH RBC QN AUTO: 30.1 PG (ref 26.5–33)
MCHC RBC AUTO-ENTMCNC: 32 G/DL (ref 31.5–36.5)
MCV RBC AUTO: 94 FL (ref 78–100)
MONOCYTES # BLD AUTO: 0.2 10E9/L (ref 0–1.3)
MONOCYTES NFR BLD AUTO: 4 %
NEUTROPHILS # BLD AUTO: 3.1 10E9/L (ref 1.6–8.3)
NEUTROPHILS NFR BLD AUTO: 61.4 %
NRBC # BLD AUTO: 0 10*3/UL
NRBC BLD AUTO-RTO: 0 /100
PLATELET # BLD AUTO: 246 10E9/L (ref 150–450)
RBC # BLD AUTO: 3.09 10E12/L (ref 3.8–5.2)
WBC # BLD AUTO: 5.1 10E9/L (ref 4–11)

## 2018-07-31 PROCEDURE — 84520 ASSAY OF UREA NITROGEN: CPT | Performed by: INTERNAL MEDICINE

## 2018-07-31 PROCEDURE — 86140 C-REACTIVE PROTEIN: CPT | Performed by: INTERNAL MEDICINE

## 2018-07-31 PROCEDURE — 82565 ASSAY OF CREATININE: CPT | Performed by: INTERNAL MEDICINE

## 2018-07-31 PROCEDURE — 85025 COMPLETE CBC W/AUTO DIFF WBC: CPT | Performed by: INTERNAL MEDICINE

## 2018-07-31 PROCEDURE — 85652 RBC SED RATE AUTOMATED: CPT | Performed by: INTERNAL MEDICINE

## 2018-07-31 PROCEDURE — 84450 TRANSFERASE (AST) (SGOT): CPT | Performed by: INTERNAL MEDICINE

## 2018-07-31 NOTE — PROGRESS NOTES
[facility] now requests orders and shares plan of care/discharge summaries for some patients through [employer].  Please REPLY TO THIS MESSAGE OR ROUTE BACK TO THE AUTHOR in order to give authorization for orders when needed.  This is considered a verbal order, you will still receive a faxed copy of orders for signature.  Thank you for your assistance in improving collaboration for our patients.    ORDER    Skilled Nursing  1 week 1  2 week 3  1 week 1  4 as needed    Main focus  PICC line cares, home safety, disease process education, medication education, labs, nutrition and hydration assess and educate    Erick Pérez RN   [facility]  [phone]

## 2018-07-31 NOTE — PROGRESS NOTES
CHIEF COMPLAINT:  Right foot plantar chronic nonhealing ulcer.      HISTORY OF PRESENT ILLNESS:  Mrs. Herr presents today for further evaluation with regards to the right foot.  The patient reports to have had some issues with the foot and to be currently on IV antibiotics.  Continues having serious issues with regards to her kidney disease and reports to be eligible for a kidney transplant; however, she cannot be on the list given the fact that she has been given a diagnosis of osteomyelitis in the right foot.  This diagnosis has been made by MRI.  No biopsy has been performed.      PHYSICAL EXAMINATION:  On today's visit, she presents with a well-healed wound along the plantar aspect.  Presents with a very small scab which is extremely healthy with no redness, swelling or signs of active infection.  Presented with an extremely prominent second metatarsal head plantarly, as well as a very prominent sesamoid complex on the first MTP joint which is fused.      RADIOGRAPHIC EVALUATION:  Plain x-rays were reviewed today which were not showing any signs of osteomyelitis.  X-rays are from 05/2018.      ASSESSMENT:  Resolved chronic nonhealing ulcer, right foot.      PLAN:  I discussed with the patient and her daughter that at this point the recommendation will be to proceed with a second metatarsal excision and a sesamoidectomy of the first MTP joint for the right foot.  I discussed with them that this is approximately a half an hour procedure and it will require her to do slide transfers for the first 2 weeks and she will be nonweightbearing on the right foot and she still does not have prosthesis available for the left leg.      I discussed with them the most likely postoperative course and complications which include but are not limited to infection, bleeding, nerve damage, and residual pain.      I believe that this would be the safest way to give her a foot that would be sturdy enough to proceed with some minor  ambulation as long as she has a prosthesis on the left leg.      The alternative would be to continue with chronic antibiotic suppression and having some very limited standing, given the fact that the foot is very fragile and prone to chronic ulcers.      I encouraged them to proceed with an assessment for surgical risks and if this is acceptable to them to contact us with regards to the surgery for the right foot.      All questions were answered.  The patient will follow up on a p.r.n. basis.      TT 15 minutes, CT 10 minutes.

## 2018-07-31 NOTE — MR AVS SNAPSHOT
After Visit Summary   7/31/2018    Supriya Herr    MRN: [id number]           Patient Information     Date Of Birth          1950        Visit Information        Provider Department      7/31/2018 6:00 PM Alvaro Gautam MD [facility]         Follow-ups after your visit        Your next 10 appointments already scheduled     Aug 02, 2018  9:00 AM CDT   (Arrive by 8:45 AM)   Return Visit with Ewelina Chen MD   [facility] ([facility])    [address] [place] [address]  [address]  [facility] [id number] [phone]            Aug 13, 2018  3:45 PM CDT   Return Visit with Kathryn Basilio MD   [facility] ([facility])    [address] [place] [id number] [phone]            Sep 13, 2018  2:30 PM CDT   (Arrive by 2:15 PM)   Office Visit with Brenden Blackwell [facility] ([facility])    [address] [place] [address]  3rd Floor  [facility] [id number] [phone]           Bring a current list of meds and any records pertaining to this visit. For Physicals, please bring immunization records and any forms needing to be filled out. Please arrive 10 minutes early to complete paperwork.            Sep 13, 2018  3:30 PM CDT   Lab with  LAB   [facility] ([facility])    [address] [place] [address]  1st Floor  [facility] [id number] [phone]            Sep 13, 2018  4:30 PM CDT   (Arrive by 4:00 PM)   Return Visit with Kathryn Basilio MD   [facility] Nephrology ([facility])    [address] [place] [address]  [address]  [facility] [id number] [phone]            Sep 20, 2018  3:00 PM CDT   (Arrive by 2:45 PM)   RETURN DIABETES with Arabella Kamara PA-C   [facility] Endocrinology ([facility])    [address] [place] [address]  Floor  [facility] [id number] [phone]            Jan 22, 2019  3:20 PM CST   CT CHEST W/O CONTRAST with UCCT2   [facility] ([facility])    [address]  1st Floor  [facility] [id number] [phone]           Please bring any scans or X-rays taken at other hospitals, if similar tests were done. Also bring a list of your medicines, including vitamins, minerals and over-the-counter drugs. It is safest to leave personal items at home.  Be sure to tell your doctor:   If you have any allergies.   If there s any chance you are pregnant.   If you are breastfeeding.  You do not need to do anything special to prepare for this exam.  Please wear loose clothing, such as a sweat suit or jogging clothes. Avoid snaps, zippers and other metal. We may ask you to undress and put on a hospital gown.            Jan 22, 2019  4:30 PM CST   (Arrive by 4:15 PM)   Return Visit with Ravin King MD   [facility] ([facility])    [address] [place] [address]  [address]  [facility] [id number] [phone]              Who to contact     Please call your clinic at [phone] to:    Ask questions about your health    Make or cancel appointments    Discuss your medicines    Learn about your test results    Speak to your doctor            Additional Information About Your Visit        Care EveryWhere ID     This is your Care EveryWhere ID. This could be used by other organizations to access your [facility] medical records  [id number]         Blood Pressure from Last 3 Encounters:   07/18/18 177/74   07/18/18 180/70   07/14/18 138/78    Weight from Last 3 Encounters:   07/14/18 86.6 kg (191 lb)   07/12/18 87.2 kg (192 lb 3.9 oz)   06/25/18 86.6 kg (191 lb)              Today, you had the following     No orders found for display       Primary Care Provider Office Phone # Fax #    Noam Jackson [phone]        [address] [facility] [address]  [facility] [place] [address]        Equal Access to Services     KALYANI WARREN : Hadii danisha jacobson xin Soaleksandar, waulyssesda lujasmeetadaha, qaanivalta kamarcusshell galanvalorieshell, madeline pruitt savannahheather giangevercl chaudhari. So [facility] [phone].    ATENCIÓN: Si habla español, tiene a santoro disposición servicios gratuitos de asistencia lingüística. Llame al [phone].    We comply with applicable federal civil rights laws and [place] laws. We do not discriminate on the basis of race, color, national origin, age, disability, sex, sexual orientation, or gender identity.            Thank you!     Thank you for choosing [facility]  for your care. Our goal is always to provide you with excellent care. Hearing back from our patients is one way we can continue to improve our services. Please take a few minutes to complete the written survey that you may receive in the mail after your visit with us. Thank you!             Your Updated Medication List - Protect others around you: Learn how to safely use, store and throw away your medicines at www.disposemymeds.org.          This list is accurate as of 7/31/18  6:15 PM.  Always use your most recent med list.                   Brand Name Dispense Instructions for use Diagnosis    ACE/ARB/ARNI NOT PRESCRIBED (INTENTIONAL)      Please choose reason not prescribed, below    CKD (chronic kidney disease) stage 5, GFR less than 15 ml/min (H)       acetaminophen 325 MG tablet    TYLENOL    100 tablet    Take 2 tablets (650 mg) by mouth every 4 hours as needed for mild pain    Diabetic ulcer of toe of right foot associated with type 2 diabetes mellitus, with other ulcer severity (H)       albuterol 108 (90 Base) MCG/ACT Inhaler    PROAIR HFA/PROVENTIL HFA/VENTOLIN HFA    3 Inhaler    Inhale 2 puffs into the lungs every 6 hours as needed for shortness of breath / dyspnea or wheezing    Cough       atorvastatin 10 MG tablet    LIPITOR    30 tablet    Take 1 tablet (10 mg) by  mouth daily    Hyperlipidemia LDL goal <100       BENADRYL PO      Take 25 mg by mouth        blood glucose monitoring test strip    ONETOUCH ULTRA    200 strip    Test your blood sugar 3-4 times per day.    Type 2 diabetes mellitus with hyperglycemia, with long-term current use of insulin (H)       carvedilol 12.5 MG tablet    COREG    60 tablet    Take 1 tablet (12.5 mg) by mouth 2 times daily (with meals)    Essential hypertension with goal blood pressure less than 140/90       clindamycin 1 % lotion    CLEOCIN T    60 mL    Apply topically 2 times daily    Intertrigo       FLORAJEN ACIDOPHILUS PO      Take 1 capsule by mouth daily        furosemide 40 MG tablet    LASIX    30 tablet    Take 1 tablet (40 mg) by mouth 2 times daily    Lymphedema of both lower extremities       * insulin glargine 100 UNIT/ML injection    LANTUS    3 mL    Inject 28 Units Subcutaneous At Bedtime    Type 2 diabetes mellitus with diabetic neuropathy, with long-term current use of insulin (H)       * BASAGLAR 100 UNIT/ML injection     3 mL    Inject 28 Units Subcutaneous At Bedtime    Type 2 diabetes mellitus with diabetic neuropathy, with long-term current use of insulin (H)       insulin pen needle 31G X 6 MM    ULTICARE MINI    100 each    Use daily or as directed.    Type 2 diabetes, HbA1c goal < 7% (H)       methylPREDNISolone 4 MG tablet    MEDROL DOSEPAK    21 tablet    Follow package instructions        NovoLOG FLEXPEN 100 UNIT/ML injection   Generic drug:  insulin aspart     15 mL    Inject 7units SQ with breakfast, lunch and dinner.    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, subsequent encounter (H)       sertraline 50 MG tablet    ZOLOFT    30 tablet    Take 1/2 tablet (25 mg) for 1-2 weeks, then increase to 1 tablet orally daily    Acute reaction to stress       triamcinolone 0.1 % ointment    KENALOG    453.6 g    Apply topically 2 times daily as needed (itching) To body except for face, groin, and armpits    Drug rash       Urea 20 % Crea cream     85 g    Apply topically daily    Xerosis cutis, Tyloma       vitamin D 2000 units tablet     100 tablet    Take 2,000 Units by mouth daily    Vitamin D deficiency       * Notice:  This list has 5 medication(s) that are the same as other medications prescribed for you. Read the directions carefully, and ask your doctor or other care provider to review them with you.

## 2018-07-31 NOTE — NURSING NOTE
Reason For Visit:   Chief Complaint   Patient presents with     RECHECK     Right foot wound           Pain Assessment  Patient Currently in Pain: No

## 2018-07-31 NOTE — LETTER
7/31/2018       RE: Supriya Herr  [address]  [facility] [address]     Dear Colleague,    Thank you for referring your patient, Supriya Herr, to the [facility] at [facility]. Please see a copy of my visit note below.    CHIEF COMPLAINT:  Right foot plantar chronic nonhealing ulcer.      HISTORY OF PRESENT ILLNESS:  Mrs. Herr presents today for further evaluation with regards to the right foot.  The patient reports to have had some issues with the foot and to be currently on IV antibiotics.  Continues having serious issues with regards to her kidney disease and reports to be eligible for a kidney transplant; however, she cannot be on the list given the fact that she has been given a diagnosis of osteomyelitis in the right foot.  This diagnosis has been made by MRI.  No biopsy has been performed.      PHYSICAL EXAMINATION:  On today's visit, she presents with a well-healed wound along the plantar aspect.  Presents with a very small scab which is extremely healthy with no redness, swelling or signs of active infection.  Presented with an extremely prominent second metatarsal head plantarly, as well as a very prominent sesamoid complex on the first MTP joint which is fused.      RADIOGRAPHIC EVALUATION:  Plain x-rays were reviewed today which were not showing any signs of osteomyelitis.  X-rays are from 05/2018.      ASSESSMENT:  Resolved chronic nonhealing ulcer, right foot.      PLAN:  I discussed with the patient and her daughter that at this point the recommendation will be to proceed with a second metatarsal excision and a sesamoidectomy of the first MTP joint for the right foot.  I discussed with them that this is approximately a half an hour procedure and it will require her to do slide transfers for the first 2 weeks and she will be nonweightbearing on the right foot and she still does not have prosthesis available for the left leg.      I  discussed with them the most likely postoperative course and complications which include but are not limited to infection, bleeding, nerve damage, and residual pain.      I believe that this would be the safest way to give her a foot that would be sturdy enough to proceed with some minor ambulation as long as she has a prosthesis on the left leg.      The alternative would be to continue with chronic antibiotic suppression and having some very limited standing, given the fact that the foot is very fragile and prone to chronic ulcers.      I encouraged them to proceed with an assessment for surgical risks and if this is acceptable to them to contact us with regards to the surgery for the right foot.      All questions were answered.  The patient will follow up on a p.r.n. basis.      TT 15 minutes, CT 10 minutes.         Again, thank you for allowing me to participate in the care of your patient.      Sincerely,    Alvaro Gautam MD

## 2018-08-01 ENCOUNTER — HOME INFUSION (PRE-WILLOW HOME INFUSION) (OUTPATIENT)
Dept: PHARMACY | Facility: CLINIC | Age: 68
End: 2018-08-01

## 2018-08-02 ENCOUNTER — HOME INFUSION (PRE-WILLOW HOME INFUSION) (OUTPATIENT)
Dept: PHARMACY | Facility: CLINIC | Age: 68
End: 2018-08-02

## 2018-08-02 ENCOUNTER — OFFICE VISIT (OUTPATIENT)
Dept: INFECTIOUS DISEASES | Facility: CLINIC | Age: 68
End: 2018-08-02
Attending: INTERNAL MEDICINE
Payer: MEDICARE

## 2018-08-02 VITALS — HEART RATE: 57 BPM | DIASTOLIC BLOOD PRESSURE: 60 MMHG | TEMPERATURE: 97.9 F | SYSTOLIC BLOOD PRESSURE: 165 MMHG

## 2018-08-02 DIAGNOSIS — T84.7XXD HARDWARE COMPLICATING WOUND INFECTION, SUBSEQUENT ENCOUNTER: Primary | ICD-10-CM

## 2018-08-02 DIAGNOSIS — N18.6 ESRD (END STAGE RENAL DISEASE) (H): ICD-10-CM

## 2018-08-02 DIAGNOSIS — T50.905A ADVERSE EFFECT OF DRUG, INITIAL ENCOUNTER: ICD-10-CM

## 2018-08-02 PROCEDURE — G0463 HOSPITAL OUTPT CLINIC VISIT: HCPCS | Mod: ZF

## 2018-08-02 RX ORDER — DOXYCYCLINE 100 MG/1
100 CAPSULE ORAL 2 TIMES DAILY
Qty: 60 CAPSULE | Refills: 0 | Status: SHIPPED | OUTPATIENT
Start: 2018-08-02 | End: 2018-08-26

## 2018-08-02 ASSESSMENT — ENCOUNTER SYMPTOMS
SLEEP DISTURBANCES DUE TO BREATHING: 0
LIGHT-HEADEDNESS: 0
EXERCISE INTOLERANCE: 0
HYPOTENSION: 0
SKIN CHANGES: 0
NAIL CHANGES: 0
SYNCOPE: 0
PALPITATIONS: 0
POOR WOUND HEALING: 0
NERVOUS/ANXIOUS: 1
PANIC: 0
ORTHOPNEA: 0
INSOMNIA: 1
LEG PAIN: 0
DECREASED CONCENTRATION: 0
DEPRESSION: 1
HYPERTENSION: 1

## 2018-08-02 ASSESSMENT — PAIN SCALES - GENERAL: PAINLEVEL: NO PAIN (0)
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
## 2018-08-02 NOTE — PROGRESS NOTES
This is a recent snapshot of the patient's [facility] medical record.  For current drug dose and complete information and questions, call [phone] or In [facility] pool, [facility] ([id number])  CSN Number:  [id number]

## 2018-08-02 NOTE — MR AVS SNAPSHOT
After Visit Summary   8/2/2018    Supriya Herr    MRN: [id number]           Patient Information     Date Of Birth          1950        Visit Information        Provider Department      8/2/2018 9:00 AM Ewelina Chen MD [facility] and Infectious Diseases        Today's Diagnoses     Hardware complicating wound infection, subsequent encounter    -  1       Follow-ups after your visit        Your next 10 appointments already scheduled     Aug 13, 2018  3:45 PM CDT   Return Visit with Kathryn Basilio MD   [facility] Renal ([facility])    [address] [address]   [phone]            Sep 13, 2018  2:30 PM CDT   (Arrive by 2:15 PM)   Office Visit with Brenden Blackwell [facility] Medication Therapy Management ([facility])    [address]  [address] [facility] [address] [phone]           Bring a current list of meds and any records pertaining to this visit. For Physicals, please bring immunization records and any forms needing to be filled out. Please arrive 10 minutes early to complete paperwork.            Sep 13, 2018  3:30 PM CDT   Lab with  LAB   [facility] Lab ([facility])    [address]  [address] [facility] [id number] [phone]            Sep 13, 2018  4:30 PM CDT   (Arrive by 4:00 PM)   Return Visit with Kathryn Basilio MD   [facility] Nephrology ([facility])    [address]  [address]  [facility] [id number] [phone]            Sep 20, 2018  3:00 PM CDT   (Arrive by 2:45 PM)   RETURN DIABETES with Arabella Kamara PA-C   [facility] Endocrinology ([facility])    [address]  3rd Floor  [facility] [id number] [phone]            Jan 22, 2019  3:20 PM CST   CT CHEST W/O CONTRAST with UCCT2   [facility] Imaging Center CT ([facility])    [address]  1st Floor  [facility] [id number] [phone]           Please bring any scans or X-rays taken at other hospitals, if similar tests were done. Also bring a list of your medicines, including vitamins, minerals and over-the-counter drugs. It is safest to leave personal items at home.  Be sure to tell your doctor:   If you have any allergies.   If there s any chance you are pregnant.   If you are breastfeeding.  You do not need to do anything special to prepare for this exam.  Please wear loose clothing, such as a sweat suit or jogging clothes. Avoid snaps, zippers and other metal. We may ask you to undress and put on a hospital gown.            Jan 22, 2019  4:30 PM CST   (Arrive by 4:15 PM)   Return Visit with Ravin King MD   [facility] ([facility])    [address]  [address]  [facility] [id number] [phone]              Who to contact     If you have questions or need follow up information about today's clinic visit or your schedule please contact [facility] AND INFECTIOUS DISEASES directly at [phone].  Normal or non-critical lab and imaging results will be communicated to you by MyChart, letter or phone within 4 business days after the clinic has received the results. If you do not hear from us within 7 days, please contact the clinic through MyChart or phone. If you have a critical or abnormal lab result, we will notify you by phone as soon as possible.  Submit refill requests through [employer] or call your pharmacy and they will forward the refill request to us. Please allow 3 business days for your refill to be completed.          Additional Information About Your Visit        Care EveryWhere ID     This is your Care EveryWhere ID. This could be used by other organizations to access your [place] medical records  [id number]        Your Vitals Were     Pulse Temperature                 57 97.9  F (36.6  C) (Oral)           Blood Pressure from Last 3 Encounters:   08/02/18 165/60   07/18/18 177/74   07/18/18 180/70    Weight from Last 3 Encounters:   07/14/18 86.6 kg (191 lb)   07/12/18 87.2 kg (192 lb 3.9 oz)   06/25/18 86.6 kg (191 lb)              We Performed the Following     PICC removal          Today's Medication Changes          These changes are accurate as of 8/2/18  9:41 AM.  If you have any questions, ask your nurse or doctor.               Start taking these medicines.        Dose/Directions    doxycycline 100 MG capsule   Commonly known as:  VIBRAMYCIN   Used for:  Hardware complicating wound infection, subsequent encounter   Started by:  Ewelina Chen MD        Dose:  100 mg   Take 1 capsule (100 mg) by mouth 2 times daily   Quantity:  60 capsule   Refills:  0         These medicines have changed or have updated prescriptions.        Dose/Directions    * insulin glargine 100 UNIT/ML injection   Commonly known as:  LANTUS   This may have changed:  Another medication with the same name was changed. Make sure you understand how and when to take each.   Used for:  Type 2 diabetes mellitus with diabetic neuropathy, with long-term current use of insulin (H)        Dose:  25 Units   Inject 25 Units Subcutaneous At Bedtime   Quantity:  3 mL   Refills:  1       * BASAGLAR 100 UNIT/ML injection   This may have changed:  how much to take   Used for:  Type 2 diabetes mellitus with diabetic neuropathy, with long-term current use of insulin (H)        Dose:  28 Units   Inject 28 Units Subcutaneous At Bedtime   Quantity:  3 mL   Refills:  1       * Notice:  This list has 2 medication(s) that are the same as other medications prescribed for you. Read the directions carefully, and ask your doctor or other care provider to review them with you.         Where to get your medicines      These medications were sent to [facility]/pharmacy #[id number] - [address]  [facility] [address]     Phone:  [phone]     doxycycline 100 MG capsule                Primary Care Provider Office Phone # Fax #    Noam Luis Jackson [phone]       [address] [facility] [address]  [facility] [address]        Equal Access to Services     KALYANI WARREN : Hadii aad ku hadasho Soomaali, waaxda luqadaha, qaybta kaalmada adeegyada, waxay gusin haydarcyn adetere giangevercl chaudhari. So [facility] [phone].    ATENCIÓN: Si habla español, tiene a santoro disposición servicios gratuitos de asistencia lingüística. Xeniaflako al [phone].    We comply with applicable federal civil rights laws and [place] laws. We do not discriminate on the basis of race, color, national origin, age, disability, sex, sexual orientation, or gender identity.            Thank you!     Thank you for choosing [facility] AND INFECTIOUS DISEASES  for your care. Our goal is always to provide you with excellent care. Hearing back from our patients is one way we can continue to improve our services. Please take a few minutes to complete the written survey that you may receive in the mail after your visit with us. Thank you!             Your Updated Medication List - Protect others around you: Learn how to safely use, store and throw away your medicines at www.disposemymeds.org.          This list is accurate as of 8/2/18  9:41 AM.  Always use your most recent med list.                   Brand Name Dispense Instructions for use Diagnosis    ACE/ARB/ARNI NOT PRESCRIBED (INTENTIONAL)      Please choose reason not prescribed, below    CKD (chronic kidney disease) stage 5, GFR less than 15 ml/min (H)       acetaminophen 325 MG tablet    TYLENOL    100 tablet    Take 2 tablets (650 mg) by mouth every 4 hours as needed for mild pain    Diabetic ulcer of toe of right foot associated with type 2 diabetes mellitus, with other ulcer severity (H)       albuterol 108 (90 Base) MCG/ACT Inhaler    PROAIR  HFA/PROVENTIL HFA/VENTOLIN HFA    3 Inhaler    Inhale 2 puffs into the lungs every 6 hours as needed for shortness of breath / dyspnea or wheezing    Cough       atorvastatin 10 MG tablet    LIPITOR    30 tablet    Take 1 tablet (10 mg) by mouth daily    Hyperlipidemia LDL goal <100       BENADRYL PO      Take 25 mg by mouth        blood glucose monitoring test strip    ONETOUCH ULTRA    200 strip    Test your blood sugar 3-4 times per day.    Type 2 diabetes mellitus with hyperglycemia, with long-term current use of insulin (H)       carvedilol 12.5 MG tablet    COREG    60 tablet    Take 1 tablet (12.5 mg) by mouth 2 times daily (with meals)    Essential hypertension with goal blood pressure less than 140/90       clindamycin 1 % lotion    CLEOCIN T    60 mL    Apply topically 2 times daily    Intertrigo       doxycycline 100 MG capsule    VIBRAMYCIN    60 capsule    Take 1 capsule (100 mg) by mouth 2 times daily    Hardware complicating wound infection, subsequent encounter       FLORAJEN ACIDOPHILUS PO      Take 1 capsule by mouth daily        furosemide 40 MG tablet    LASIX    30 tablet    Take 1 tablet (40 mg) by mouth 2 times daily    Lymphedema of both lower extremities       * insulin glargine 100 UNIT/ML injection    LANTUS    3 mL    Inject 25 Units Subcutaneous At Bedtime    Type 2 diabetes mellitus with diabetic neuropathy, with long-term current use of insulin (H)       * BASAGLAR 100 UNIT/ML injection     3 mL    Inject 28 Units Subcutaneous At Bedtime    Type 2 diabetes mellitus with diabetic neuropathy, with long-term current use of insulin (H)       insulin pen needle 31G X 6 MM    ULTICARE MINI    100 each    Use daily or as directed.    Type 2 diabetes, HbA1c goal < 7% (H)       methylPREDNISolone 4 MG tablet    MEDROL DOSEPAK    21 tablet    Follow package instructions        NovoLOG FLEXPEN 100 UNIT/ML injection   Generic drug:  insulin aspart     15 mL    4 Units Inject 7units SQ with  breakfast, lunch and dinner.    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, subsequent encounter (H)       sertraline 50 MG tablet    ZOLOFT    30 tablet    Take 1/2 tablet (25 mg) for 1-2 weeks, then increase to 1 tablet orally daily    Acute reaction to stress       triamcinolone 0.1 % ointment    KENALOG    453.6 g    Apply topically 2 times daily as needed (itching) To body except for face, groin, and armpits    Drug rash       Urea 20 % Crea cream     85 g    Apply topically daily    Xerosis cutis, Tyloma       vitamin D 2000 units tablet     100 tablet    Take 2,000 Units by mouth daily    Vitamin D deficiency       * Notice:  This list has 5 medication(s) that are the same as other medications prescribed for you. Read the directions carefully, and ask  your doctor or other care provider to review them with you.

## 2018-08-02 NOTE — NURSING NOTE
PICC line pulled without complications, occlusive dressing and pressure wrap (coban) applied. Insertion site is c/d/i- no bleeding or signs of infection observed. Catheter intact. Pt education given along with home care instructions. Pt left in care of self and daughter.  Viky Shi RN

## 2018-08-02 NOTE — NURSING NOTE
/60  Pulse 57  Temp 97.9  F (36.6  C) (Oral)  Chief Complaint   Patient presents with     RECHECK     follow up with foot infection, tzimmer cma
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
## 2018-08-03 RX ORDER — FLUOCINONIDE 0.5 MG/G
OINTMENT TOPICAL
Qty: 60 G | Refills: 1 | Status: ON HOLD | OUTPATIENT
Start: 2018-08-03 | End: 2018-09-26

## 2018-08-04 DIAGNOSIS — E11.65 TYPE 2 DIABETES MELLITUS WITH HYPERGLYCEMIA, WITH LONG-TERM CURRENT USE OF INSULIN (H): ICD-10-CM

## 2018-08-04 DIAGNOSIS — Z79.4 TYPE 2 DIABETES MELLITUS WITH HYPERGLYCEMIA, WITH LONG-TERM CURRENT USE OF INSULIN (H): ICD-10-CM

## 2018-08-06 NOTE — PROGRESS NOTES
This is a recent snapshot of the patient's [facility] medical record.  For current drug dose and complete information and questions, call [phone] or In Basket pool, [facility] ([id number])  CSN Number:  [id number]

## 2018-08-08 ENCOUNTER — TELEPHONE (OUTPATIENT)
Dept: INFECTIOUS DISEASES | Facility: CLINIC | Age: 68
End: 2018-08-08

## 2018-08-08 DIAGNOSIS — T78.40XD ALLERGIC REACTION, SUBSEQUENT ENCOUNTER: Primary | ICD-10-CM

## 2018-08-08 NOTE — TELEPHONE ENCOUNTER
[facility] Call Center    Phone Message    May a detailed message be left on voicemail: yes    Reason for Call: Other: Pharmacy calling stating that fluocinonide (LIDEX) is not covered by pts insurance. They are asking for an alternative to be submitted. However, if Dr. Chen wants fluocinonide (LIDEX) specifically, it needs a PA.     Action Taken: Message routed to:  [facility] ([facility]):  INFECTIOUS DISEASE

## 2018-08-09 ENCOUNTER — MYC MEDICAL ADVICE (OUTPATIENT)
Dept: INFECTIOUS DISEASES | Facility: CLINIC | Age: 68
End: 2018-08-09

## 2018-08-09 NOTE — TELEPHONE ENCOUNTER
Prior Authorization Retail Medication Request    Medication/Dose: fluocinonide (LIDEX) 0.05 % ointment  ICD code (if different than what is on RX):  Additional ICD code Allergic reaction, subsequent encounter [T78.40XD]   Previously Tried and Failed:  Pt did not tolerate triamcinolone.  Rationale:      Insurance Name:  Medicare [id number]; Medicaid [id number]   Insurance ID:        Pharmacy Information (if different than what is on RX)  Name:    Phone:

## 2018-08-09 NOTE — TELEPHONE ENCOUNTER
Central Prior Authorization Team   Phone: [phone]      PA Initiation    Medication: fluocinonide (LIDEX) 0.05 % ointment-PA initiated  Insurance Company: [employer] Part D - Phone [phone] Fax [phone]  Pharmacy Filling the Rx: [id number] - [place], [place] - [address] [place] [address]  Filling Pharmacy Phone: [phone]  Filling Pharmacy Fax: [phone]  Start Date: 8/9/2018

## 2018-08-09 NOTE — TELEPHONE ENCOUNTER
Prior Authorization Approval    Authorization Effective Date: 5/11/2018  Authorization Expiration Date: 8/9/2019  Medication: fluocinonide (LIDEX) 0.05 % ointment-APPROVED  Approved Dose/Quantity:   Reference #: [id number]   Insurance Company: [employer] Part D - Phone [phone] Fax [phone]  Expected CoPay:       CoPay Card Available:      Foundation Assistance Needed:    Which Pharmacy is filling the prescription (Not needed for infusion/clinic administered): [id number] - [place], [place] - [address] [place] [address]  Pharmacy Notified: Yes  Patient Notified: No-Pharmacy will notify patient when ready.

## 2018-08-13 ENCOUNTER — OFFICE VISIT (OUTPATIENT)
Dept: NEPHROLOGY | Facility: CLINIC | Age: 68
End: 2018-08-13
Payer: MEDICARE

## 2018-08-13 VITALS — HEART RATE: 70 BPM | DIASTOLIC BLOOD PRESSURE: 54 MMHG | SYSTOLIC BLOOD PRESSURE: 149 MMHG

## 2018-08-13 DIAGNOSIS — I10 HYPERTENSION GOAL BP (BLOOD PRESSURE) < 140/90: ICD-10-CM

## 2018-08-13 DIAGNOSIS — E11.628 DIABETIC FOOT INFECTION (H): ICD-10-CM

## 2018-08-13 DIAGNOSIS — D63.1 ANEMIA IN STAGE 5 CHRONIC KIDNEY DISEASE, NOT ON CHRONIC DIALYSIS (H): ICD-10-CM

## 2018-08-13 DIAGNOSIS — N18.5 ANEMIA IN STAGE 5 CHRONIC KIDNEY DISEASE, NOT ON CHRONIC DIALYSIS (H): ICD-10-CM

## 2018-08-13 DIAGNOSIS — R80.9 PROTEINURIA, UNSPECIFIED TYPE: Primary | ICD-10-CM

## 2018-08-13 DIAGNOSIS — N18.5 CKD (CHRONIC KIDNEY DISEASE) STAGE 5, GFR LESS THAN 15 ML/MIN (H): ICD-10-CM

## 2018-08-13 DIAGNOSIS — L08.9 DIABETIC FOOT INFECTION (H): ICD-10-CM
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
## 2018-08-14 DIAGNOSIS — E11.40 TYPE 2 DIABETES MELLITUS WITH DIABETIC NEUROPATHY, WITH LONG-TERM CURRENT USE OF INSULIN (H): ICD-10-CM

## 2018-08-14 DIAGNOSIS — Z79.4 TYPE 2 DIABETES MELLITUS WITH DIABETIC NEUROPATHY, WITH LONG-TERM CURRENT USE OF INSULIN (H): ICD-10-CM

## 2018-08-14 NOTE — TELEPHONE ENCOUNTER
Requested Prescriptions   Pending Prescriptions Disp Refills     BASAGLAR 100 UNIT/ML injection [Pharmacy Med Name: BASAGLAR 100 UNIT/ML [id number]]    Last Written Prescription Date:  7-24-18  Last Fill Quantity: 3mL,  # refills: 1   Last office visit: 7/3/2018 with prescribing provider:  Dr. Jackson   Future Office Visit:   Next 5 appointments (look out 90 days)     Aug 21, 2018  5:00 PM CDT   Office Visit with Noam Jackson MD   [facility] ([facility]    [address] [place] [id number] [phone]            Sep 13, 2018  2:30 PM CDT   (Arrive by 2:15 PM)   Office Visit with Brenden Blackwell [facility] ([facility])    [address] [id number] [phone]                   1     Sig: INJECT 28 UNITS SUBCUTANEOUS AT BEDTIME    Long Acting Insulin Protocol Failed    8/14/2018  1:56 AM       Failed - Blood pressure less than 140/90 in past 6 months    BP Readings from Last 3 Encounters:   08/13/18 149/54   08/02/18 165/60   07/18/18 177/74                Passed - LDL on file in past 12 months    Recent Labs   Lab Test  03/29/18   1410   LDL  108*            Passed - Microalbumin on file in past 12 months    Recent Labs   Lab Test  01/09/18   1041   MICROL  2880   UMALCR  6037.74*            Passed - Serum creatinine on file in past 12 months    Recent Labs   Lab Test  07/31/18   1148   CR  3.36*            Passed - HgbA1C in past 3 or 6 months    If HgbA1C is 8 or greater, it needs to be on file within the past 3 months.  If less than 8, must be on file within the past 6 months.     Recent Labs   Lab Test  06/22/18   1306  12/22/17   A1C  6.0*   < >   --    HEMOGLOBINA1   --    --   6.8*    < > = values in this interval not displayed.            Passed - Patient is age 18 or older       Passed - Recent (6 mo) or future (30 days) visit within the  "authorizing provider's specialty    Patient had office visit in the last 6 months or has a visit in the next 30 days with authorizing provider or within the authorizing provider's specialty.  See \"Patient Info\" tab in inbasket, or \"Choose Columns\" in Meds & Orders section of the refill encounter.              "

## 2018-08-15 RX ORDER — INSULIN GLARGINE 100 [IU]/ML
INJECTION, SOLUTION SUBCUTANEOUS
Qty: 3 ML | Refills: 1 | Status: SHIPPED | OUTPATIENT
Start: 2018-08-15 | End: 2018-08-28

## 2018-08-15 NOTE — TELEPHONE ENCOUNTER
Prescription approved per [facility] Refill Protocol.    Ana Luisa Garcia RN  [facility]

## 2018-08-16 DIAGNOSIS — I10 ESSENTIAL HYPERTENSION WITH GOAL BLOOD PRESSURE LESS THAN 140/90: ICD-10-CM

## 2018-08-16 DIAGNOSIS — I89.0 LYMPHEDEMA OF BOTH LOWER EXTREMITIES: ICD-10-CM

## 2018-08-16 RX ORDER — FUROSEMIDE 20 MG
TABLET ORAL
Qty: 90 TABLET | Refills: 11 | Status: SHIPPED | OUTPATIENT
Start: 2018-08-16 | End: 2018-08-21

## 2018-08-17 ENCOUNTER — DOCUMENTATION ONLY (OUTPATIENT)
Dept: CARE COORDINATION | Facility: CLINIC | Age: 68
End: 2018-08-17

## 2018-08-17 ENCOUNTER — TELEPHONE (OUTPATIENT)
Dept: FAMILY MEDICINE | Facility: CLINIC | Age: 68
End: 2018-08-17

## 2018-08-17 DIAGNOSIS — Z79.4 TYPE 2 DIABETES MELLITUS WITH DIABETIC NEUROPATHY, WITH LONG-TERM CURRENT USE OF INSULIN (H): ICD-10-CM

## 2018-08-17 DIAGNOSIS — E11.40 TYPE 2 DIABETES MELLITUS WITH DIABETIC NEUROPATHY, WITH LONG-TERM CURRENT USE OF INSULIN (H): ICD-10-CM

## 2018-08-17 NOTE — TELEPHONE ENCOUNTER
Reason for call:  Other   Patient called regarding (reason for call): call back  Additional comments: The patient called and stated that she normally has been getting two pens when she refills the BASAGLAR 100 UNIT/ML injection and this last time she got it filled she only got one. She also stated that when she gets it, it does not seem to last as long as it is supposed to. She would like a call back to discuss both of these things.      Phone number to reach patient:  Home number on file [phone] (home)    Best Time: Any    Can we leave a detailed message on this number?  YES

## 2018-08-17 NOTE — PROGRESS NOTES
[facility] now requests orders and shares plan of care/discharge summaries for some patients through [email or web address].  Please REPLY TO THIS MESSAGE OR ROUTE BACK TO THE AUTHOR in order to give authorization for orders when needed.  This is considered a verbal order, you will still receive a faxed copy of orders for signature.  Thank you for your assistance in improving collaboration for our patients.    ORDER    Wound care orders      Daily wound care to right planter foot wound  cleanse with wound cleanser  cover with non aderent dressing until healed.  Family to complete on non nursing days    left gluetus treatment  Apply barrier cream to left gluetus and apply transparant dressing every 3 days if needed to protect skin and prn to aide in skin protection from sheering  Family to perform on non nursing day.    Erick Pérez RN   [facility]  [phone]

## 2018-08-20 NOTE — TELEPHONE ENCOUNTER
Called number listed below. VM box not set up. Unable to leave .     Ana Luisa Garcia RN  [facility]

## 2018-08-21 ENCOUNTER — CARE COORDINATION (OUTPATIENT)
Dept: NEPHROLOGY | Facility: CLINIC | Age: 68
End: 2018-08-21

## 2018-08-21 ENCOUNTER — OFFICE VISIT (OUTPATIENT)
Dept: FAMILY MEDICINE | Facility: CLINIC | Age: 68
End: 2018-08-21
Payer: MEDICARE

## 2018-08-21 VITALS
DIASTOLIC BLOOD PRESSURE: 64 MMHG | OXYGEN SATURATION: 98 % | HEART RATE: 64 BPM | SYSTOLIC BLOOD PRESSURE: 130 MMHG | TEMPERATURE: 98.2 F

## 2018-08-21 DIAGNOSIS — L30.1 DYSHIDROTIC ECZEMA: Primary | ICD-10-CM

## 2018-08-21 DIAGNOSIS — N18.5 CKD (CHRONIC KIDNEY DISEASE) STAGE 5, GFR LESS THAN 15 ML/MIN (H): ICD-10-CM

## 2018-08-21 DIAGNOSIS — Z13.89 SCREENING FOR DIABETIC PERIPHERAL NEUROPATHY: ICD-10-CM

## 2018-08-21 PROCEDURE — 99214 OFFICE O/P EST MOD 30 MIN: CPT | Performed by: FAMILY MEDICINE

## 2018-08-21 RX ORDER — TRIAMCINOLONE ACETONIDE 1 MG/G
OINTMENT TOPICAL
Qty: 30 G | Refills: 0 | Status: SHIPPED | OUTPATIENT
Start: 2018-08-21 | End: 2018-09-14

## 2018-08-21 NOTE — PROGRESS NOTES
SUBJECTIVE:   Supriya Herr is a 68 year old female who presents to clinic today for the following health issues:    Rash  Onset: 2 weeks     Description:   Location: hands   Character: flakey, red. Was painful at first   Itching (Pruritis): not anymore     Progression of Symptoms:  same    Accompanying Signs & Symptoms:  Fever: no   Body aches or joint pain: no   Sore throat symptoms: no   Recent cold symptoms: no     History:   Previous similar rash: no     Precipitating factors:   Exposure to similar rash: no   New exposures: None   Recent travel: no     Alleviating factors:  Nothing     Therapies Tried and outcome: Aveeno lotion     Other questions/concerns: discuss need for surgery     Hypertension Follow-up      Outpatient blood pressures are not being checked.    Low Salt Diet: no added salt    Chronic Kidney Disease Follow-up      Current NSAID use?  No    Hoping for transplant  Osteomyelitis improving on plantar aspect of foot, on doxy now  Encounter Diagnoses   Name Primary?     CKD (chronic kidney disease) stage 5, GFR less than 15 ml/min (H)      Dyshidrotic eczema Yes     Screening for diabetic peripheral neuropathy         Problem list and histories reviewed & adjusted, as indicated.  Additional history: as documented    Labs reviewed in EPIC    Reviewed and updated as needed this visit by clinical staff  Tobacco  Allergies  Meds  Med Hx  Surg Hx  Fam Hx  Soc Hx      Reviewed and updated as needed this visit by Provider         ROS:  Constitutional, HEENT, cardiovascular, pulmonary, gi and gu systems are negative, except as otherwise noted.    OBJECTIVE:     /64 (BP Location: Right arm, Patient Position: Sitting, Cuff Size: Adult Large)  Pulse 64  Temp 98.2  F (36.8  C) (Oral)  SpO2 98%  There is no height or weight on file to calculate BMI.  GENERAL: alert, no distress, frail and elderly  NECK: no adenopathy, no asymmetry, masses, or scars and thyroid normal to palpation  RESP:  lungs clear to auscultation - no rales, rhonchi or wheezes  CV: regular rates and rhythm, normal S1 S2, no S3 or S4 and no peripheral edema  ABDOMEN: soft, nontender, no hepatosplenomegaly, no masses and bowel sounds normal  MS: muscle wasting in leg, abnormal right pedal pulse(s)  and arthritis of the ankle with healing / scabbed over lesion distal plantar aspect of foot  SKIN: slightly scaly , non tender patches dorsum of both hands  NEURO: mentation intact  PSYCH: mentation appears normal, fatigued, speech pressured and judgement and insight intact    Diagnostic Test Results:  Results for orders placed or performed in visit on 07/18/18   Renal panel   Result Value Ref Range    Sodium 142 133 - 144 mmol/L    Potassium 4.2 3.4 - 5.3 mmol/L    Chloride 111 (H) 94 - 109 mmol/L    Carbon Dioxide 23 20 - 32 mmol/L    Anion Gap 9 3 - 14 mmol/L    Glucose 129 (H) 70 - 99 mg/dL    Urea Nitrogen 91 (H) 7 - 30 mg/dL    Creatinine 3.95 (H) 0.52 - 1.04 mg/dL    GFR Estimate 11 (L) >60 mL/min/1.7m2    GFR Estimate If Black 14 (L) >60 mL/min/1.7m2    Calcium 8.4 (L) 8.5 - 10.1 mg/dL    Phosphorus 4.0 2.5 - 4.5 mg/dL    Albumin 2.6 (L) 3.4 - 5.0 g/dL   Hemoglobin   Result Value Ref Range    Hemoglobin 9.8 (L) 11.7 - 15.7 g/dL   IRON AND IRON BINDING CAPACITY   Result Value Ref Range    Iron 76 35 - 180 ug/dL    Iron Binding Cap 196 (L) 240 - 430 ug/dL    Iron Saturation Index 39 15 - 46 %   FERRITIN   Result Value Ref Range    Ferritin 450 (H) 8 - 252 ng/mL     *Note: Due to a large number of results and/or encounters for the requested time period, some results have not been displayed. A complete set of results can be found in Results Review.       ASSESSMENT/PLAN:             1. CKD (chronic kidney disease) stage 5, GFR less than 15 ml/min (H)  Hoping for transplant but stable for now  ' really wants to go on a trip now, as she is dojng better and this is a huge qulaity of life issue I urged them to go soon then plan  surgery    2. Dyshidrotic eczema  limti sum, use vanoicream  - triamcinolone (KENALOG) 0.1 % ointment; Apply sparingly to affected area three times daily for 14 days.  Dispense: 30 g; Refill: 0  3. Healing osteomyelitis, I agree with plan to remove hardwear, seeing how well she is recovering from her infection I am hopeful this would help  4. Screening for diabetic peripheral neuropathy  done  - FOOT EXAM  NO CHARGE [45888.114]    See Patient Instructions    Noam Jackson MD  [facility] [place]

## 2018-08-21 NOTE — LETTER
My Depression Action Plan  Name: Supriya Herr   Date of Birth 1950  Date: 8/20/2018    My doctor: Noam Jackson   My clinic: [address] [address] [phone]          GREEN    ZONE   Good Control    What it looks like:     Things are going generally well. You have normal up s and down s. You may even feel depressed from time to time, but bad moods usually last less than a day.   What you need to do:  1. Continue to care for yourself (see self care plan)  2. Check your depression survival kit and update it as needed  3. Follow your physician s recommendations including any medication.  4. Do not stop taking medication unless you consult with your physician first.           YELLOW         ZONE Getting Worse    What it looks like:     Depression is starting to interfere with your life.     It may be hard to get out of bed; you may be starting to isolate yourself from others.    Symptoms of depression are starting to last most all day and this has happened for several days.     You may have suicidal thoughts but they are not constant.   What you need to do:     1. Call your care team, your response to treatment will improve if you keep your care team informed of your progress. Yellow periods are signs an adjustment may need to be made.     2. Continue your self-care, even if you have to fake it!    3. Talk to someone in your support network    4. Open up your depression survival kit           RED    ZONE Medical Alert - Get Help    What it looks like:     Depression is seriously interfering with your life.     You may experience these or other symptoms: You can t get out of bed most days, can t work or engage in other necessary activities, you have trouble taking care of basic hygiene, or basic responsibilities, thoughts of suicide or death that will not go away, self-injurious behavior.     What you need to  do:  1. Call your care team and request a same-day appointment. If they are not available (weekends or after hours) call your local crisis line, emergency room or 911.            Depression Self Care Plan / Survival Kit    Self-Care for Depression  Here s the deal. Your body and mind are really not as separate as most people think.  What you do and think affects how you feel and how you feel influences what you do and think. This means if you do things that people who feel good do, it will help you feel better.  Sometimes this is all it takes.  There is also a place for medication and therapy depending on how severe your depression is, so be sure to consult with your medical provider and/ or Behavioral Health Consultant if your symptoms are worsening or not improving.     In order to better manage my stress, I will:    Exercise  Get some form of exercise, every day. This will help reduce pain and release endorphins, the  feel good  chemicals in your brain. This is almost as good as taking antidepressants!  This is not the same as joining a gym and then never going! (they count on that by the way ) It can be as simple as just going for a walk or doing some gardening, anything that will get you moving.      Hygiene   Maintain good hygiene (Get out of bed in the morning, Make your bed, Brush your teeth, Take a shower, and Get dressed like you were going to work, even if you are unemployed).  If your clothes don't fit try to get ones that do.    Diet  I will strive to eat foods that are good for me, drink plenty of water, and avoid excessive sugar, caffeine, alcohol, and other mood-altering substances.  Some foods that are helpful in depression are: complex carbohydrates, B vitamins, flaxseed, fish or fish oil, fresh fruits and vegetables.    Psychotherapy  I agree to participate in Individual Therapy (if recommended).    Medication  If prescribed medications, I agree to take them.  Missing doses can result in serious  side effects.  I understand that drinking alcohol, or other illicit drug use, may cause potential side effects.  I will not stop my medication abruptly without first discussing it with my provider.    Staying Connected With Others  I will stay in touch with my friends, family members, and my primary care provider/team.    Use your imagination  Be creative.  We all have a creative side; it doesn t matter if it s oil painting, sand castles, or mud pies! This will also kick up the endorphins.    Witness Beauty  (AKA stop and smell the roses) Take a look outside, even in mid-winter. Notice colors, textures. Watch the squirrels and birds.     Service to others  Be of service to others.  There is always someone else in need.  By helping others we can  get out of ourselves  and remember the really important things.  This also provides opportunities for practicing all the other parts of the program.    Humor  Laugh and be silly!  Adjust your TV habits for less news and crime-drama and more comedy.    Control your stress  Try breathing deep, massage therapy, biofeedback, and meditation. Find time to relax each day.     My support system    Clinic Contact:  Phone number:    Contact 1:  Phone number:    Contact 2:  Phone number:    Mandaen/:  Phone number:    Therapist:  Phone number:    Local crisis center:    Phone number:    Other community support:  Phone number:

## 2018-08-21 NOTE — PROGRESS NOTES
Daughter called to clarify patient's furosemide dose. Has been taking 40 mg BID. But a script for 40 mg in morning and 20 mg in evening was sent to patient's pharmacy.  Looks like patient had both doses listed in her chart. Dr. Basilio's last note indicated that patient was taking 40 mg BID and did not recommend any changes. So I called patient's daughter to clarify that patient should continue same dose. I deleted wrong dose from patient's chart.    Pravin Cordova RN

## 2018-08-21 NOTE — MR AVS SNAPSHOT
After Visit Summary   8/21/2018    Supriya Herr    MRN: [id number]           Patient Information     Date Of Birth          1950        Visit Information        Provider Department      8/21/2018 5:00 PM Noam Jackson MD [facility] [place]        Today's Diagnoses     Dyshidrotic eczema    -  1    CKD (chronic kidney disease) stage 5, GFR less than 15 ml/min (H)        Screening for diabetic peripheral neuropathy           Follow-ups after your visit        Your next 10 appointments already scheduled     Sep 13, 2018  2:30 PM CDT   (Arrive by 2:15 PM)   Office Visit with Brenden Blackwell [facility] ([facility])    [address] [place] [address] [id number] [phone]           Bring a current list of meds and any records pertaining to this visit. For Physicals, please bring immunization records and any forms needing to be filled out. Please arrive 10 minutes early to complete paperwork.            Sep 13, 2018  3:30 PM CDT   Lab with  LAB   [facility] Lab ([facility])    [address] [place] [address] [id number] [phone]            Sep 13, 2018  4:30 PM CDT   (Arrive by 4:00 PM)   Return Visit with Kathryn Basilio MD   [facility] Nephrology ([facility])    [address] [place] [address]  [address]  [facility] [id number] [phone]            Sep 20, 2018  3:00 PM CDT   (Arrive by 2:45 PM)   RETURN DIABETES with Arabella Kamara PA-C   [facility] Endocrinology ([facility])    [address] [place] [id number] [phone]            Jan 22, 2019  3:20 PM CST   CT CHEST W/O CONTRAST with UCCT2   [facility] Imaging [place] [facility] ([facility])    [address] [place] [address] [id number] [phone]           Please bring any  scans or X-rays taken at other hospitals, if similar tests were done. Also bring a list of your medicines, including vitamins, minerals and over-the-counter drugs. It is safest to leave personal items at home.  Be sure to tell your doctor:   If you have any allergies.   If there s any chance you are pregnant.   If you are breastfeeding.  You do not need to do anything special to prepare for this exam.  Please wear loose clothing, such as a sweat suit or jogging clothes. Avoid snaps, zippers and other metal. We may ask you to undress and put on a hospital gown.            Jan 22, 2019  4:30 PM CST   (Arrive by 4:15 PM)   Return Visit with Ravin King MD   [facility] ([facility])    [address]  [address]  [facility] [address] [phone]              Who to contact     If you have questions or need follow up information about today's clinic visit or your schedule please contact [facility] directly at [phone].  Normal or non-critical lab and imaging results will be communicated to you by [employer], letter or phone within 4 business days after the clinic has received the results. If you do not hear from us within 7 days, please contact the clinic through [employer] or phone. If you have a critical or abnormal lab result, we will notify you by phone as soon as possible.  Submit refill requests through [employer] or call your pharmacy and they will forward the refill request to us. Please allow 3 business days for your refill to be completed.          Additional Information About Your Visit        [employer] Information     [employer] gives you secure access to your electronic health record. If you see a primary care provider, you can also send messages to your care team and make appointments. If you have questions, please call your primary care clinic.  If you do not have a primary care provider, please call [phone] and they will assist you.         Care EveryWhere ID     This is your Care EveryWhere ID. This could be used by other organizations to access your [facility] medical records  [id number]        Your Vitals Were     Pulse Temperature Pulse Oximetry             64 98.2  F (36.8  C) (Oral) 98%          Blood Pressure from Last 3 Encounters:   08/21/18 130/64   08/13/18 149/54   08/02/18 165/60    Weight from Last 3 Encounters:   07/14/18 191 lb (86.6 kg)   07/12/18 192 lb 3.9 oz (87.2 kg)   06/25/18 191 lb (86.6 kg)              We Performed the Following     DEPRESSION ACTION PLAN (DAP)     FOOT EXAM  NO CHARGE [61567.114]          Today's Medication Changes          These changes are accurate as of 8/21/18  6:11 PM.  If you have any questions, ask your nurse or doctor.               These medicines have changed or have updated prescriptions.        Dose/Directions    furosemide 40 MG tablet   Commonly known as:  LASIX   This may have changed:  Another medication with the same name was removed. Continue taking this medication, and follow the directions you see here.   Used for:  Lymphedema of both lower extremities   Changed by:  Pravin Cordova, SHANTA        Dose:  40 mg   Take 1 tablet (40 mg) by mouth 2 times daily   Quantity:  30 tablet   Refills:  11       * triamcinolone 0.1 % ointment   Commonly known as:  KENALOG   This may have changed:  Another medication with the same name was added. Make sure you understand how and when to take each.   Used for:  Drug rash   Changed by:  Noam Jackson MD        Apply topically 2 times daily as needed (itching) To body except for face, groin, and armpits   Quantity:  453.6 g   Refills:  0       * triamcinolone 0.1 % ointment   Commonly known as:  KENALOG   This may have changed:  You were already taking a medication with the same name, and this prescription was added. Make sure you understand how and when to take each.   Used for:  Dyshidrotic eczema   Changed by:  Noam Jackson MD         Apply sparingly to affected area three times daily for 14 days.   Quantity:  30 g   Refills:  0       * Notice:  This list has 2 medication(s) that are the same as other medications prescribed for you. Read the directions carefully, and ask your doctor or other care provider to review them with you.      Stop taking these medicines if you haven't already. Please contact your care team if you have questions.     BENADRYL PO   Stopped by:  Noam Jackson MD           FLORAJEN ACIDOPHILUS PO   Stopped by:  Noam Jackson MD           methylPREDNISolone 4 MG tablet   Commonly known as:  MEDROL DOSEPAK   Stopped by:  Noam Jackson MD                Where to get your medicines      Some of these will need a paper prescription and others can be bought over the counter.  Ask your nurse if you have questions.     Bring a paper prescription for each of these medications     triamcinolone 0.1 % ointment                Primary Care Provider Office Phone # Fax #    Noam Jackson [phone]       [address] [facility] [address]  [facility] [address]        Equal Access to Services     [facility]: Hadii aad ku hadasho Soomaali, waaxda luqadaha, qaybta kaalmada adeegyada, waxay idiin hayamy sood . So [facility] [phone].    ATENCIÓN: Si habla español, tiene a santoro disposición servicios gratuitos de asistencia lingüística. Llame al [phone].    We comply with applicable federal civil rights laws and [place] laws. We do not discriminate on the basis of race, color, national origin, age, disability, sex, sexual orientation, or gender identity.            Thank you!     Thank you for choosing [facility]  for your care. Our goal is always to provide you with excellent care. Hearing back from our patients is one way we can continue to improve our services. Please take a few minutes to complete the written survey that you may receive in the mail  after your visit with us. Thank you!             Your Updated Medication List - Protect others around you: Learn how to safely use, store and throw away your medicines at www.disposemymeds.org.          This list is accurate as of 8/21/18  6:11 PM.  Always use your most recent med list.                   Brand Name Dispense Instructions for use Diagnosis    ACE/ARB/ARNI NOT PRESCRIBED (INTENTIONAL)      Please choose reason not prescribed, below    CKD (chronic kidney disease) stage 5, GFR less than 15 ml/min (H)       acetaminophen 325 MG tablet    TYLENOL    100 tablet    Take 2 tablets (650 mg) by mouth every 4 hours as needed for mild pain    Diabetic ulcer of toe of right foot associated with type 2 diabetes mellitus, with other ulcer severity (H)       albuterol 108 (90 Base) MCG/ACT inhaler    PROAIR HFA/PROVENTIL HFA/VENTOLIN HFA    3 Inhaler    Inhale 2 puffs into the lungs every 6 hours as needed for shortness of breath / dyspnea or wheezing    Cough       atorvastatin 10 MG tablet    LIPITOR    30 tablet    Take 1 tablet (10 mg) by mouth daily    Hyperlipidemia LDL goal <100       carvedilol 12.5 MG tablet    COREG    60 tablet    Take 1 tablet (12.5 mg) by mouth 2 times daily (with meals)    Essential hypertension with goal blood pressure less than 140/90       clindamycin 1 % lotion    CLEOCIN T    60 mL    Apply topically 2 times daily    Intertrigo       Clobetasol Propionate 0.025 % Crea     60 g    Externally apply 5 g topically 2 times daily as needed Apply sparingly to arms twice daily as needed.    Allergic reaction, subsequent encounter       doxycycline 100 MG capsule    VIBRAMYCIN    60 capsule    Take 1 capsule (100 mg) by mouth 2 times daily    Hardware complicating wound infection, subsequent encounter       FLORAJEN3 PO           fluocinonide 0.05 % ointment    LIDEX    60 g    Apply sparingly to affected area twice daily as needed.  Do not apply to face.    Adverse effect of drug, initial  encounter       furosemide 40 MG tablet    LASIX    30 tablet    Take 1 tablet (40 mg) by mouth 2 times daily    Lymphedema of both lower extremities       * insulin glargine 100 UNIT/ML injection    LANTUS    3 mL    Inject 25 Units Subcutaneous At Bedtime    Type 2 diabetes mellitus with diabetic neuropathy, with long-term current use of insulin (H)       * BASAGLAR 100 UNIT/ML injection     3 mL    INJECT 28 UNITS SUBCUTANEOUS AT BEDTIME    Type 2 diabetes mellitus with diabetic neuropathy, with long-term current use of insulin (H)       insulin pen needle 31G X 6 MM    ULTICARE MINI    100 each    Use daily or as directed.    Type 2 diabetes, HbA1c goal < 7% (H)       NovoLOG FLEXPEN 100 UNIT/ML injection   Generic drug:  insulin aspart     15 mL    4 Units Inject 7units SQ with breakfast, lunch and dinner.    Type 2 diabetes mellitus with hyperglycemia, with long-term current use of insulin (H)       ONETOUCH ULTRA test strip   Generic drug:  blood glucose monitoring     400 strip    TEST YOUR BLOOD SUGAR 3-4 TIMES PER DAY.    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    Dyshidrotic eczema       Urea 20 % Crea cream     85 g    Apply topically daily    Xerosis cutis, Tyloma       vitamin D 2000 units tablet     100 tablet    Take 2,000 Units by mouth daily    Vitamin D deficiency       * Notice:  This list has 7 medication(s) that are the same as other medications prescribed for you. Read the directions carefully, and ask your doctor or other care provider to review them with you.

## 2018-08-23 ENCOUNTER — TELEPHONE (OUTPATIENT)
Dept: ORTHOPEDICS | Facility: CLINIC | Age: 68
End: 2018-08-23

## 2018-08-23 NOTE — TELEPHONE ENCOUNTER
Patient is scheduled for surgery with Dr. Gautam      Spoke or left message with: Patient's daughterCaroline    Date of Surgery: 9/26/18    Location: [place]    Pre-op with surgeon (if applicable): Not required    H&P: Scheduled with PAC 9/14/18    Post ops: 2 weeks & 6 weeks, scheduled    Additional imaging/appointments: N/A    Surgery packet: Received in clinic    Additional comments: N/A

## 2018-08-26 DIAGNOSIS — T84.7XXD HARDWARE COMPLICATING WOUND INFECTION, SUBSEQUENT ENCOUNTER: ICD-10-CM

## 2018-08-27 RX ORDER — DOXYCYCLINE 100 MG/1
CAPSULE ORAL
Qty: 60 CAPSULE | Refills: 2 | Status: SHIPPED | OUTPATIENT
Start: 2018-08-27 | End: 2018-10-01 | Stop reason: ALTCHOICE

## 2018-08-28 DIAGNOSIS — T84.7XXD HARDWARE COMPLICATING WOUND INFECTION, SUBSEQUENT ENCOUNTER: ICD-10-CM

## 2018-08-28 RX ORDER — INSULIN GLARGINE 100 [IU]/ML
INJECTION, SOLUTION SUBCUTANEOUS
Qty: 3 ML | Refills: 11 | Status: SHIPPED | OUTPATIENT
Start: 2018-08-28 | End: 2018-10-01

## 2018-08-28 RX ORDER — DOXYCYCLINE 100 MG/1
CAPSULE ORAL
Qty: 60 CAPSULE | Refills: 0 | OUTPATIENT
Start: 2018-08-28

## 2018-08-28 NOTE — TELEPHONE ENCOUNTER
Called patient. She stated that she needs a refill sent to the pharmacy. Stated that she goes through almost an entire pen in 1 week, needs more refills.    Prescription approved per [facility] Refill Protocol (basaglar)    Ana Luisa Garcia RN  [facility]

## 2018-08-30 ENCOUNTER — MEDICAL CORRESPONDENCE (OUTPATIENT)
Dept: HEALTH INFORMATION MANAGEMENT | Facility: CLINIC | Age: 68
End: 2018-08-30

## 2018-09-04 DIAGNOSIS — E11.21 TYPE 2 DIABETES MELLITUS WITH DIABETIC NEPHROPATHY, WITH LONG-TERM CURRENT USE OF INSULIN (H): ICD-10-CM

## 2018-09-04 DIAGNOSIS — Z79.4 TYPE 2 DIABETES MELLITUS WITH DIABETIC NEPHROPATHY, WITH LONG-TERM CURRENT USE OF INSULIN (H): ICD-10-CM

## 2018-09-04 DIAGNOSIS — E08.42 DIABETIC POLYNEUROPATHY ASSOCIATED WITH DIABETES MELLITUS DUE TO UNDERLYING CONDITION (H): ICD-10-CM

## 2018-09-04 NOTE — TELEPHONE ENCOUNTER
"Requested Prescriptions   Pending Prescriptions Disp Refills     DULoxetine (CYMBALTA) 30 MG EC capsule [Pharmacy Med Name: DULOXETINE HCL DR 30 MG CAP] 180 capsule 3    This medication was discontinued. Sig: TAKE 1 CAPSULE (30 MG) BY MOUTH 2 TIMES DAILY    Serotonin-Norepinephrine Reuptake Inhibitors  Passed    9/4/2018  2:02 AM       Passed - Blood pressure under 140/90 in past 12 months    BP Readings from Last 3 Encounters:   08/21/18 130/64   08/13/18 149/54   08/02/18 165/60                Passed - Recent (12 mo) or future (30 days) visit within the authorizing provider's specialty    Patient had office visit in the last 12 months or has a visit in the next 30 days with authorizing provider or within the authorizing provider's specialty.  See \"Patient Info\" tab in inbasket, or \"Choose Columns\" in Meds & Orders section of the refill encounter.           Passed - Patient is age 18 or older       Passed - No active pregnancy on record       Passed - No positive pregnancy test in past 12 months          "

## 2018-09-06 ENCOUNTER — MYC MEDICAL ADVICE (OUTPATIENT)
Dept: FAMILY MEDICINE | Facility: CLINIC | Age: 68
End: 2018-09-06

## 2018-09-06 RX ORDER — DULOXETIN HYDROCHLORIDE 30 MG/1
CAPSULE, DELAYED RELEASE ORAL
Qty: 180 CAPSULE | Refills: 3 | Status: SHIPPED | OUTPATIENT
Start: 2018-09-06 | End: 2018-09-13

## 2018-09-06 NOTE — TELEPHONE ENCOUNTER
[facility] message sent to patient.    Ana Luisa Garcia RN  [facility]

## 2018-09-06 NOTE — TELEPHONE ENCOUNTER
Medication Refill Request    Medication(s) requested:  DULoxetine (CYMBALTA) 30 MG EC capsule     Last Office Visit: 8/21/18  Labs: up to date   Next Office Visit: none scheduled at this time     Rx approved and refilled per [facility] refill protocol    Laura Yin RN  09/06/18  2:42 PM

## 2018-09-10 ENCOUNTER — MYC MEDICAL ADVICE (OUTPATIENT)
Dept: FAMILY MEDICINE | Facility: CLINIC | Age: 68
End: 2018-09-10

## 2018-09-10 NOTE — TELEPHONE ENCOUNTER
Patient requesting to discuss her mothers medical information in her chart - sent [employer] message education on appropriate communication     Closing encounter - no further actions needed at this time    David Boothe RN

## 2018-09-11 DIAGNOSIS — N18.5 CKD (CHRONIC KIDNEY DISEASE) STAGE 5, GFR LESS THAN 15 ML/MIN (H): Primary | ICD-10-CM

## 2018-09-13 ENCOUNTER — OFFICE VISIT (OUTPATIENT)
Dept: PHARMACY | Facility: CLINIC | Age: 68
End: 2018-09-13

## 2018-09-13 ENCOUNTER — OFFICE VISIT (OUTPATIENT)
Dept: NEPHROLOGY | Facility: CLINIC | Age: 68
End: 2018-09-13
Attending: INTERNAL MEDICINE
Payer: MEDICARE

## 2018-09-13 ENCOUNTER — MYC MEDICAL ADVICE (OUTPATIENT)
Dept: FAMILY MEDICINE | Facility: CLINIC | Age: 68
End: 2018-09-13

## 2018-09-13 VITALS — SYSTOLIC BLOOD PRESSURE: 139 MMHG | DIASTOLIC BLOOD PRESSURE: 69 MMHG | HEART RATE: 69 BPM

## 2018-09-13 VITALS — WEIGHT: 183 LBS | BODY MASS INDEX: 29.54 KG/M2

## 2018-09-13 DIAGNOSIS — M86.9 OSTEOMYELITIS, UNSPECIFIED SITE, UNSPECIFIED TYPE (H): ICD-10-CM

## 2018-09-13 DIAGNOSIS — Z79.4 TYPE 2 DIABETES MELLITUS WITH COMPLICATION, WITH LONG-TERM CURRENT USE OF INSULIN (H): ICD-10-CM

## 2018-09-13 DIAGNOSIS — E11.8 TYPE 2 DIABETES MELLITUS WITH COMPLICATION, WITH LONG-TERM CURRENT USE OF INSULIN (H): ICD-10-CM

## 2018-09-13 DIAGNOSIS — R11.0 NAUSEA: Primary | ICD-10-CM

## 2018-09-13 DIAGNOSIS — N18.5 CKD (CHRONIC KIDNEY DISEASE) STAGE 5, GFR LESS THAN 15 ML/MIN (H): ICD-10-CM

## 2018-09-13 DIAGNOSIS — I10 HYPERTENSION GOAL BP (BLOOD PRESSURE) < 140/90: Primary | ICD-10-CM

## 2018-09-13 LAB
ALBUMIN SERPL-MCNC: 2.4 G/DL (ref 3.4–5)
ANION GAP SERPL CALCULATED.3IONS-SCNC: 8 MMOL/L (ref 3–14)
BUN SERPL-MCNC: 76 MG/DL (ref 7–30)
CALCIUM SERPL-MCNC: 8.4 MG/DL (ref 8.5–10.1)
CHLORIDE SERPL-SCNC: 111 MMOL/L (ref 94–109)
CO2 SERPL-SCNC: 23 MMOL/L (ref 20–32)
CREAT SERPL-MCNC: 3.35 MG/DL (ref 0.52–1.04)
GFR SERPL CREATININE-BSD FRML MDRD: 14 ML/MIN/1.7M2
GLUCOSE SERPL-MCNC: 109 MG/DL (ref 70–99)
HGB BLD-MCNC: 9.7 G/DL (ref 11.7–15.7)
PHOSPHATE SERPL-MCNC: 4.7 MG/DL (ref 2.5–4.5)
POTASSIUM SERPL-SCNC: 4.2 MMOL/L (ref 3.4–5.3)
SODIUM SERPL-SCNC: 142 MMOL/L (ref 133–144)

## 2018-09-13 PROCEDURE — 80069 RENAL FUNCTION PANEL: CPT | Performed by: INTERNAL MEDICINE

## 2018-09-13 PROCEDURE — 36415 COLL VENOUS BLD VENIPUNCTURE: CPT | Performed by: INTERNAL MEDICINE

## 2018-09-13 PROCEDURE — 99607 MTMS BY PHARM ADDL 15 MIN: CPT | Performed by: PHARMACIST

## 2018-09-13 PROCEDURE — 99606 MTMS BY PHARM EST 15 MIN: CPT | Performed by: PHARMACIST

## 2018-09-13 PROCEDURE — 99207 C MD CERTIFICATION HHA PATIENT: CPT | Performed by: FAMILY MEDICINE

## 2018-09-13 PROCEDURE — G0463 HOSPITAL OUTPT CLINIC VISIT: HCPCS | Mod: ZF

## 2018-09-13 PROCEDURE — 85018 HEMOGLOBIN: CPT | Performed by: INTERNAL MEDICINE

## 2018-09-13 RX ORDER — ONDANSETRON 4 MG/1
4 TABLET, FILM COATED ORAL EVERY 12 HOURS PRN
Qty: 18 TABLET | Refills: 1 | Status: SHIPPED | OUTPATIENT
Start: 2018-09-13 | End: 2018-09-14

## 2018-09-13 ASSESSMENT — PAIN SCALES - GENERAL: PAINLEVEL: NO PAIN (0)

## 2018-09-13 NOTE — MR AVS SNAPSHOT
After Visit Summary   9/13/2018    Supriya Herr    MRN: [id number]           Patient Information     Date Of Birth          1950        Visit Information        Provider Department      9/13/2018 2:30 PM Brenden Blackwell [facility] Medication Therapy Management        Care Instructions    Recommendations from today's MTM visit:                                                      1. Try taking Famotidine (Pepcid) 20mg once daily for your stomach pain. If this does not improve it, try taking Omeprazole 20mg for 2 weeks.     2. Restart your probiotics.     3. Ask Dr. Basilio about Losartan. Would want to hold off starting until after vacation and surgery.     Next MTM visit: 11/8/18 at 12:30    To schedule another MTM appointment, please call the clinic directly or you may call the MTM scheduling line at [phone] or toll-free at [phone].     My Clinical Pharmacist's contact information:                                                      It was a pleasure seeing you today!  Please feel free to contact me with any questions or concerns you have.      Brenden Blackwell, PharmD  MTM Pharmacist    Phone: [phone]     You may receive a survey about the MT services you received.  I would appreciate your feedback to help me serve you better in the future. Please fill it out and return it when you can. Your comments will be anonymous.              Follow-ups after your visit        Your next 10 appointments already scheduled     Sep 13, 2018  3:30 PM CDT   Lab with  LAB   [facility] Lab ([facility])    [address]  1st [address]  [place] [address] [phone]            Sep 13, 2018  4:30 PM CDT   (Arrive by 4:00 PM)   Return Visit with Kathryn Basilio MD   [facility] Nephrology ([facility])    [address]  [address]  [place] [address] [phone]            Sep 14, 2018 10:30 AM CDT    (Arrive by 10:15 AM)   PAC EVALUATION with PENELOPE Kapoor CNP   [facility] ([facility])    [address]  [address] [place] [facility] [id number] [phone]            Sep 14, 2018 11:30 AM CDT   (Arrive by 11:15 AM)   PAC RN ASSESSMENT with  Pac Rn   [facility] ([facility] [place])    [address] [facility] [id number] [phone]            Sep 14, 2018 12:00 PM CDT   (Arrive by 11:45 AM)   PAC Anesthesia Consult with Laura Pac Anesthesiologist   [facility] ([facility] [place])    [address]  [address] [facility] [address] [phone]            Sep 26, 2018   Procedure with Alvaro Gautam MD   [facility] [place] Same Day Surgery (--)    [address] [id number] [phone]            Oct 12, 2018  3:30 PM CDT   (Arrive by 3:15 PM)   Post-Op with Laura U Ortho Nurse   [facility] ([facility] [place])    [address] [facility] [id number] [phone]            Nov 06, 2018 10:30 AM CST   (Arrive by 10:15 AM)   RETURN FOOT/ANKLE with Alvaro Gautam MD   [facility] ([facility] [place])    [address]  [address] [facility] [address] [phone]            Nov 08, 2018 11:30 AM CST   (Arrive by 11:15 AM)   RETURN DIABETES with Arabella Kamara PA-C   [facility] Endocrinology ([facility])    [address]  [address] [facility] [address] [phone]            Nov 08, 2018 12:30 PM CST   (Arrive by 12:15 PM)   Office Visit with Brenden Blackwell RPH   [facility] Medication Therapy Management ([facility] [place])    [address]  [address] [facility] [id number] [phone]           Bring a current list of  meds and any records pertaining to this visit. For Physicals, please bring immunization records and any forms needing to be filled out. Please arrive 10 minutes early to complete paperwork.              Who to contact     If you have questions or need follow up information about today's clinic visit or your schedule please contact [facility] MEDICATION THERAPY MANAGEMENT directly at [phone].  Normal or non-critical lab and imaging results will be communicated to you by [email or web address], letter or phone within 4 business days after the clinic has received the results. If you do not hear from us within 7 days, please contact the clinic through [facility] [employer] or phone. If you have a critical or abnormal lab result, we will notify you by phone as soon as possible.  Submit refill requests through [employer] or call your pharmacy and they will forward the refill request to us. Please allow 3 business days for your refill to be completed.          Additional Information About Your Visit        [email or web address] Information     [employer] gives you secure access to your electronic health record. If you see a primary care provider, you can also send messages to your care team and make appointments. If you have questions, please call your primary care clinic.  If you do not have a primary care provider, please call [phone] and they will assist you.        Care EveryWhere ID     This is your Care EveryWhere ID. This could be used by other organizations to access your [facility] medical records  [id number]         Blood Pressure from Last 3 Encounters:   08/21/18 130/64   08/13/18 149/54   08/02/18 165/60    Weight from Last 3 Encounters:   07/14/18 191 lb (86.6 kg)   07/12/18 192 lb 3.9 oz (87.2 kg)   06/25/18 191 lb (86.6 kg)              Today, you had the following     No orders found for display         Today's Medication Changes          These changes are accurate as of 9/13/18  3:20 PM.  If you have any questions, ask your nurse or doctor.                These medicines have changed or have updated prescriptions.        Dose/Directions    ATORVASTATIN CALCIUM PO   This may have changed:  Another medication with the same name was removed. Continue taking this medication, and follow the directions you see here.   Changed by:  Brenden Blackwell RPH        Dose:  20 mg   Take 20 mg by mouth daily   Refills:  0       BASAGLAR 100 UNIT/ML injection   This may have changed:    - how much to take  - how to take this  - when to take this  - additional instructions  - Another medication with the same name was removed. Continue taking this medication, and follow the directions you see here.   Used for:  Type 2 diabetes mellitus with diabetic neuropathy, with long-term current use of insulin (H)        INJECT 28 UNITS SUBCUTANEOUS AT BEDTIME   Quantity:  3 mL   Refills:  11       sertraline 25 MG tablet   Commonly known as:  ZOLOFT   This may have changed:  Another medication with the same name was removed. Continue taking this medication, and follow the directions you see here.   Used for:  NICOLE (generalized anxiety disorder)   Changed by:  Brenden Blackwell RPH        TAKE 1 TABLET (25 MG) BY MOUTH DAILY   Quantity:  30 tablet   Refills:  3         Stop taking these medicines if you haven't already. Please contact your care team if you have questions.     DULoxetine 30 MG EC capsule   Commonly known as:  CYMBALTA   Stopped by:  Brenden Blackwell RPH                    Primary Care Provider Office Phone # Fax #    Noam Luis [facility] [phone]       [address] [facility] [address]  [facility] [address]        Equal Access to Services     [facility] AH: Hadii danisha jorgeo Soaleksandar, waaxda luqadaha, qaybta kaalmada ailin, madeline sood . So [facility] [phone].    ATENCIÓN: Si habla español, tiene a santoro disposición servicios gratuitos de asistencia lingüística. Llame al [phone].    We comply with  applicable federal civil rights laws and [place] laws. We do not discriminate on the basis of race, color, national origin, age, disability, sex, sexual orientation, or gender identity.            Thank you!     Thank you for choosing [facility] MEDICATION THERAPY MANAGEMENT  for your care. Our goal is always to provide you with excellent care. Hearing back from our patients is one way we can continue to improve our services. Please take a few minutes to complete the written survey that you may receive in the mail after your visit with us. Thank you!             Your Updated Medication List - Protect others around you: Learn how to safely use, store and throw away your medicines at www.disposemymeds.org.          This list is accurate as of 9/13/18  3:20 PM.  Always use your most recent med list.                   Brand Name Dispense Instructions for use Diagnosis    ACE/ARB/ARNI NOT PRESCRIBED (INTENTIONAL)      Please choose reason not prescribed, below    CKD (chronic kidney disease) stage 5, GFR less than 15 ml/min (H)       acetaminophen 325 MG tablet    TYLENOL    100 tablet    Take 2 tablets (650 mg) by mouth every 4 hours as needed for mild pain    Diabetic ulcer of toe of right foot associated with type 2 diabetes mellitus, with other ulcer severity (H)       albuterol 108 (90 Base) MCG/ACT inhaler    PROAIR HFA/PROVENTIL HFA/VENTOLIN HFA    3 Inhaler    Inhale 2 puffs into the lungs every 6 hours as needed for shortness of breath / dyspnea or wheezing    Cough       ATORVASTATIN CALCIUM PO      Take 20 mg by mouth daily        BASAGLAR 100 UNIT/ML injection     3 mL    INJECT 28 UNITS SUBCUTANEOUS AT BEDTIME    Type 2 diabetes mellitus with diabetic neuropathy, with long-term current use of insulin (H)       carvedilol 12.5 MG tablet    COREG    60 tablet    Take 1 tablet (12.5 mg) by mouth 2 times daily (with meals)    Essential hypertension with goal blood pressure less than 140/90       clindamycin 1 %  lotion    CLEOCIN T    60 mL    Apply topically 2 times daily    Intertrigo       doxycycline 100 MG capsule    VIBRAMYCIN    60 capsule    TAKE 1 CAPSULE (100 MG) BY MOUTH 2 TIMES DAILY    Hardware complicating wound infection, subsequent encounter       FLORAJEN3 PO           fluocinonide 0.05 % ointment    LIDEX    60 g    Apply sparingly to affected area twice daily as needed.  Do not apply to face.    Adverse effect of drug, initial encounter       furosemide 40 MG tablet    LASIX    30 tablet    Take 1 tablet (40 mg) by mouth 2 times daily    Lymphedema of both lower extremities       insulin pen needle 31G X 6 MM    ULTICARE MINI    100 each    Use daily or as directed.    Type 2 diabetes, HbA1c goal < 7% (H)       NovoLOG FLEXPEN 100 UNIT/ML injection   Generic drug:  insulin aspart     15 mL    4 Units Inject 7units SQ with breakfast, lunch and dinner.    Type 2 diabetes mellitus with hyperglycemia, with long-term current use of insulin (H)       ONETOUCH ULTRA test strip   Generic drug:  blood glucose monitoring     400 strip    TEST YOUR BLOOD SUGAR 3-4 TIMES PER DAY.    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    Dyshidrotic eczema       Urea 20 % Crea cream     85 g    Apply topically daily    Xerosis cutis, Tyloma       vitamin D 2000 units tablet     100 tablet    Take 2,000 Units by mouth daily    Vitamin D deficiency       * Notice:  This list has 5 medication(s) that are the same as other medications prescribed for you. Read the directions carefully, and ask your doctor or other care provider to review them with you.
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
## 2018-09-13 NOTE — PROGRESS NOTES
SUBJECTIVE/OBJECTIVE:                Supriya Herr is a 68 year old female coming in for a follow up appointment.  She as referred from Nephrology.     Chief Complaint: Follow up from last visit: pt having stomach pain after taking Doxycycline.     Allergies/ADRs: None  Tobacco: 0-1 pack per day - is not interested in quitting sneaks in a cigarette in here and there, but unable to smoke where she is staying.   Alcohol: not currently using  PMH: Reviewed in Epic    Medication Adherence/Access:  Patient uses pill box(es) and has assistance with medication administration: family member, daughter.  Patient takes medications 2 time(s) per day.  Per patient, misses medication 1 times per week. Missed 1 evening in the past week. Been pretty consistent 95%. She has been sleeping in more recently, so her morning dose times varies.     Hypertension: Current medications include Carvedilol 12.5mg BID, Furosemide 40mg BID (does not weigh at home due to being in wheel chair), a little lymphoedema in leg, possibly due to infection.  Patient reports no current medication side effects. We discussed   Home BPs: 137/62, 141/67, 157/71, 126/64, 142/76, 146/76, 149/71, 155/77, 124/68,   In clinic with personal cuff: 141/72, 139/58    Diabetes: Pt currently taking Lantus 25 units daily, Novolog 4 units with meals, 2 units before snacks. Pt is not experiencing side effects.   SMBG: three times daily.   Ranges (patient reported):101, 162, 253, 105, 82, 167, 115, 97, 398, 128, 101, 108, 159, 142. Unfortunately the meter is not set up correctly, unable to decipher when each of these were taken. Updated time and date today.  atient is not experiencing hypoglycemia since being home.   Recent symptoms of high blood sugar? none  Eye exam: due. Last visit was 05/2017  Foot exam: up to date  Microalbumin is not < 30 mg/g. Pt is not taking an ACEi/ARB.  Aspirin: pt not taking  Diet/Exercise: Discussed with patient and daughter. Pt does have a  penchant for sweets and snacking between meals. This may be contributing to her high dinner numbers.    Osteomyelitis:  Pt is taking Doxycycline 100mg BID, states it hurts her stomach. Doesn't feel nauseous, just hurts. Started after the IV antbiotics were completed. She is having surgery to remove hardware in her foot near the infection 9/26.  She has not been taking her probiotics.     Current labs include: Today:139/69 average of 3 BPs  BP Readings from Last 3 Encounters:   09/13/18 139/69   08/21/18 130/64   08/13/18 149/54     ASSESSMENT:                 Current medications were reviewed today.      Medication Adherence: good, no issues identified    Hypertension: BP not at goal <130/80, but below 140/90. I would like to start an ARB in the future due to CKD/proteinuria, but patient is going on vacation next week (limiting potential lab follow up), and then has surgery after that to remove potential infected hardware in her foot. Lab follow up is essential considering she had hyperkalemia in the past due to Lisinopril and has CKD stage 5. She is seeing Dr. Basilio today. To bring BP down, we could consider Carvedilol increase as well.     Diabetes: Stable. BG majority in range of . No frequent hypoglycemia.     Osteomyelitis :  Needs improvement. Recommended patient try Pepcid (Famotidine), see if this improves stomach pain after taking Doxycycline. If it does try Omeprazole 20mg x 2 weeks. Also instructed to restart Probiotics.     PLAN:                Pt to...  1. Take Famotidine 20mg daily, if this is ineffective at reducing stomach pain after doxycycline, try Omeprazole 20mg x 2 weeks.   2. Restart Probiotic.    I spent 45 minutes with this patient today. I offer these suggestions for consideration by the PCP. A copy of the visit note was provided to the patient's primary care provider.    Will follow up in 1 month.    The patient declined a summary of these recommendations as an after visit  summary.    Brenden Blackwell, PharmD  [facility] Pharmacist    Phone: [phone]

## 2018-09-13 NOTE — PATIENT INSTRUCTIONS
Recommendations from today's MTM visit:                                                      1. Try taking Famotidine (Pepcid) 20mg once daily for your stomach pain. If this does not improve it, try taking Omeprazole 20mg for 2 weeks.     2. Restart your probiotics.     3. Ask Dr. Basilio about Losartan. Would want to hold off starting until after vacation and surgery.     Next MTM visit: 11/8/18 at 12:30    To schedule another MTM appointment, please call the clinic directly or you may call the MTM scheduling line at [phone] or toll-free at [phone].     My Clinical Pharmacist's contact information:                                                      It was a pleasure seeing you today!  Please feel free to contact me with any questions or concerns you have.      Brenden Blackwell, PharmD  MTM Pharmacist    Phone: [phone]     You may receive a survey about the MTM services you received.  I would appreciate your feedback to help me serve you better in the future. Please fill it out and return it when you can. Your comments will be anonymous.

## 2018-09-13 NOTE — MR AVS SNAPSHOT
After Visit Summary   9/13/2018    Supriya Herr    MRN: [id number]           Patient Information     Date Of Birth          1950        Visit Information        Provider Department      9/13/2018 4:30 PM Kathryn Basilio MD [facility] Nephrology        Today's Diagnoses     Nausea    -  1       Follow-ups after your visit        Follow-up notes from your care team     Return in about 5 weeks (around 10/18/2018).      Your next 10 appointments already scheduled     Sep 26, 2018   Procedure with Alvaro Gautam MD   [facility], [place], Same Day Surgery (--)    [address] [facility] [address]   [phone]            Oct 12, 2018  3:30 PM CDT   (Arrive by 3:15 PM)   Post-Op with  U Ortho Nurse   [facility] ([facility] [place])    [address] [place] [address]   [phone]            Nov 06, 2018 10:30 AM CST   (Arrive by 10:15 AM)   RETURN FOOT/ANKLE with Alvaro Gautam MD   [facility] ([facility] [place])    [address] [place] [address]   [phone]            Nov 08, 2018 11:30 AM CST   (Arrive by 11:15 AM)   RETURN DIABETES with Arabella Kamara PA-C   [facility] Endocrinology ([facility])    [address] [place] [address]   [phone]            Nov 08, 2018 12:30 PM CST   (Arrive by 12:15 PM)   [facility] ([facility]    [address] [place] [address]   [phone]           Bring a current list of meds and any records pertaining to this visit. For Physicals, please bring immunization records and any forms needing to be filled out. Please arrive 10 minutes early to complete paperwork.            Jan 22, 2019  3:20 PM CST   CT CHEST W/O CONTRAST with UCCT2    [facility] CT ([facility] [place])    [address]  1st Floor  [facility] [address] [phone]           How do I prepare for my exam? (Food and drink instructions) No Food and Drink Restrictions.  How do I prepare for my exam? (Other instructions) You do not need to do anything special to prepare for this exam. For a sinus scan: Use your nose spray (nasal decongestant spray) as directed.  What should I wear: Please wear loose clothing, such as a sweat suit or jogging clothes. Avoid snaps, zippers and other metal. We may ask you to undress and put on a hospital gown.  How long does the exam take: Most scans take less than 20 minutes.  What should I bring: Please bring any scans or X-rays taken at other hospitals, if similar tests were done. Also bring a list of your medicines, including vitamins, minerals and over-the-counter drugs. It is safest to leave personal items at home.  Do I need a : No  is needed.  What do I need to tell my doctor? Be sure to tell your doctor: * If you have any allergies. * If there s any chance you are pregnant. * If you are breastfeeding.  What should I do after the exam: No restrictions, You may resume normal activities.  What is this test: A CT (computed tomography) scan is a series of pictures that allows us to look inside your body. The scanner creates images of the body in cross sections, much like slices of bread. This helps us see any problems more clearly.  Who should I call with questions: If you have any questions, please call the Imaging Department where you will have your exam. Directions, parking instructions, and other information is available on our website, [email or web address].            Jan 22, 2019  4:30 PM CST   (Arrive by 4:15 PM)   Return Visit with Ravin King MD   [facility] ([facility] [place])    [address]  [address]  [facility] [address]    [phone]              Who to contact     If you have questions or need follow up information about today's clinic visit or your schedule please contact [facility] NEPHROLOGY directly at [phone].  Normal or non-critical lab and imaging results will be communicated to you by XOJEThart, letter or phone within 4 business days after the clinic has received the results. If you do not hear from us within 7 days, please contact the clinic through XOJEThart or phone. If you have a critical or abnormal lab result, we will notify you by phone as soon as possible.  Submit refill requests through katena or call your pharmacy and they will forward the refill request to us. Please allow 3 business days for your refill to be completed.          Additional Information About Your Visit        katena Information     katena gives you secure access to your electronic health record. If you see a primary care provider, you can also send messages to your care team and make appointments. If you have questions, please call your primary care clinic.  If you do not have a primary care provider, please call [phone] and they will assist you.        Care EveryWhere ID     This is your Care EveryWhere ID. This could be used by other organizations to access your [place] medical records  [id number]        Your Vitals Were     BMI (Body Mass Index)                   29.54 kg/m2            Blood Pressure from Last 3 Encounters:   09/14/18 120/68   09/13/18 139/69   08/21/18 130/64    Weight from Last 3 Encounters:   09/14/18 84.4 kg (186 lb)   09/13/18 83 kg (183 lb)   07/14/18 86.6 kg (191 lb)              Today, you had the following     No orders found for display         Today's Medication Changes          These changes are accurate as of 9/13/18 11:59 PM.  If you have any questions, ask your nurse or doctor.               Start taking these medicines.        Dose/Directions    ondansetron 4 MG tablet   Commonly known as:  ZOFRAN    Used for:  Nausea   Started by:  Kathryn Basilio MD        Dose:  4 mg   Take 1 tablet (4 mg) by mouth every 12 hours as needed for nausea   Quantity:  18 tablet   Refills:  1         These medicines have changed or have updated prescriptions.        Dose/Directions    ATORVASTATIN CALCIUM PO   This may have changed:  Another medication with the same name was removed. Continue taking this medication, and follow the directions you see here.   Changed by:  Brenden Blackwell RPH        Dose:  20 mg   Take 20 mg by mouth every morning   Refills:  0       BASAGLAR 100 UNIT/ML injection   This may have changed:    - how much to take  - how to take this  - when to take this  - additional instructions  - Another medication with the same name was removed. Continue taking this medication, and follow the directions you see here.   Used for:  Type 2 diabetes mellitus with diabetic neuropathy, with long-term current use of insulin (H)        INJECT 28 UNITS SUBCUTANEOUS AT BEDTIME   Quantity:  3 mL   Refills:  11       sertraline 25 MG tablet   Commonly known as:  ZOLOFT   This may have changed:  Another medication with the same name was removed. Continue taking this medication, and follow the directions you see here.   Used for:  NICOLE (generalized anxiety disorder)   Changed by:  Brenden Blackwell RPH        TAKE 1 TABLET (25 MG) BY MOUTH DAILY   Quantity:  30 tablet   Refills:  3         Stop taking these medicines if you haven't already. Please contact your care team if you have questions.     DULoxetine 30 MG EC capsule   Commonly known as:  CYMBALTA   Stopped by:  Brenden Blackwell RPH                Where to get your medicines      These medications were sent to [address] [address]    Hours:  TRANSPLANT PHONE NUMBER [phone] Phone:  [phone]     ondansetron 4 MG tablet                 Primary Care Provider Office Phone # Fax #    Noam Luis Jackson [phone]       [address] [facility] [address]  [facility] [address]        Equal Access to Services     KALYANI WARREN : Hadraya danisha jacobson aniao Sorocali, waaxda luqadaha, qaybta kaalmada adetereyada, madeline marin laOtisamy chaudhari. So [facility] [phone].    ATENCIÓN: Si habla español, tiene a santoro disposición servicios gratuitos de asistencia lingüística. Llame al [phone].    We comply with applicable federal civil rights laws and [place] laws. We do not discriminate on the basis of race, color, national origin, age, disability, sex, sexual orientation, or gender identity.            Thank you!     Thank you for choosing [facility] NEPHROLOGY  for your care. Our goal is always to provide you with excellent care. Hearing back from our patients is one way we can continue to improve our services. Please take a few minutes to complete the written survey that you may receive in the mail after your visit with us. Thank you!             Your Updated Medication List - Protect others around you: Learn how to safely use, store and throw away your medicines at www.disposemymeds.org.          This list is accurate as of 9/13/18 11:59 PM.  Always use your most recent med list.                   Brand Name Dispense Instructions for use Diagnosis    ACE/ARB/ARNI NOT PRESCRIBED (INTENTIONAL)      Please choose reason not prescribed, below    CKD (chronic kidney disease) stage 5, GFR less than 15 ml/min (H)       acetaminophen 325 MG tablet    TYLENOL    100 tablet    Take 2 tablets (650 mg) by mouth every 4 hours as needed for mild pain    Diabetic ulcer of toe of right foot associated with type 2 diabetes mellitus, with other ulcer severity (H)       albuterol 108 (90 Base) MCG/ACT inhaler    PROAIR HFA/PROVENTIL HFA/VENTOLIN HFA    3 Inhaler    Inhale 2 puffs into the lungs every 6 hours as needed for shortness of breath / dyspnea  or wheezing    Cough       ATORVASTATIN CALCIUM PO      Take 20 mg by mouth every morning        BASAGLAR 100 UNIT/ML injection     3 mL    INJECT 28 UNITS SUBCUTANEOUS AT BEDTIME    Type 2 diabetes mellitus with diabetic neuropathy, with long-term current use of insulin (H)       carvedilol 12.5 MG tablet    COREG    60 tablet    Take 1 tablet (12.5 mg) by mouth 2 times daily (with meals)    Essential hypertension with goal blood pressure less than 140/90       clindamycin 1 % lotion    CLEOCIN T    60 mL    Apply topically 2 times daily    Intertrigo       doxycycline 100 MG capsule    VIBRAMYCIN    60 capsule    TAKE 1 CAPSULE (100 MG) BY MOUTH 2 TIMES DAILY    Hardware complicating wound infection, subsequent encounter       FLORAJEN3 PO      Take 1 tablet by mouth every morning        fluocinonide 0.05 % ointment    LIDEX    60 g    Apply sparingly to affected area twice daily as needed.  Do not apply to face.    Adverse effect of drug, initial encounter       furosemide 40 MG tablet    LASIX    30 tablet    Take 40 mg by mouth every morning TWICE A DAY IF NEEDED    Lymphedema of both lower extremities       insulin pen needle 31G X 6 MM    ULTICARE MINI    100 each    Use daily or as directed.    Type 2 diabetes, HbA1c goal < 7% (H)       NovoLOG FLEXPEN 100 UNIT/ML injection   Generic drug:  insulin aspart     15 mL    4 Units Inject 4 units SQ with breakfast, lunch and dinner. IF SNACK BETWEEN LUNCH AND DINNER SHE TAKES AN EXTRA 2 UNITS    Type 2 diabetes mellitus with hyperglycemia, with long-term current use of insulin (H)       ondansetron 4 MG tablet    ZOFRAN    18 tablet    Take 1 tablet (4 mg) by mouth every 12 hours as needed for nausea    Nausea       ONETOUCH ULTRA test strip   Generic drug:  blood glucose monitoring     400 strip    TEST YOUR BLOOD SUGAR 3-4 TIMES PER DAY.    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, subsequent encounter (H)       sertraline 25 MG tablet    ZOLOFT    30 tablet    TAKE 1 TABLET (25 MG) BY MOUTH DAILY    NICOLE (generalized anxiety disorder)       triamcinolone 0.1 % ointment    KENALOG    453.6 g    Apply topically 2 times daily as needed (itching) To body except for face, groin, and armpits    Drug rash       Urea 20 % Crea cream     85 g    Apply topically daily    Xerosis cutis, Tyloma       vitamin D 2000 units tablet     100 tablet    Take 2,000 Units by mouth daily    Vitamin D deficiency       * Notice:  This list has 3 medication(s) that are the same as other medications prescribed for you. Read the directions carefully, and ask your doctor or other care provider to review them with you.

## 2018-09-13 NOTE — NURSING NOTE
Chief Complaint   Patient presents with     RECHECK     CKD     Wt 83 kg (183 lb)  BMI 29.54 kg/m2  Lorie Dover MA
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
## 2018-09-14 ENCOUNTER — APPOINTMENT (OUTPATIENT)
Dept: SURGERY | Facility: CLINIC | Age: 68
End: 2018-09-14
Payer: MEDICARE

## 2018-09-14 ENCOUNTER — OFFICE VISIT (OUTPATIENT)
Dept: SURGERY | Facility: CLINIC | Age: 68
End: 2018-09-14
Payer: MEDICARE

## 2018-09-14 ENCOUNTER — ANESTHESIA EVENT (OUTPATIENT)
Dept: SURGERY | Facility: CLINIC | Age: 68
End: 2018-09-14
Payer: MEDICARE

## 2018-09-14 ENCOUNTER — DOCUMENTATION ONLY (OUTPATIENT)
Dept: ORTHOPEDICS | Facility: CLINIC | Age: 68
End: 2018-09-14

## 2018-09-14 VITALS
BODY MASS INDEX: 29.89 KG/M2 | SYSTOLIC BLOOD PRESSURE: 120 MMHG | RESPIRATION RATE: 18 BRPM | HEART RATE: 66 BPM | TEMPERATURE: 98.2 F | WEIGHT: 186 LBS | OXYGEN SATURATION: 100 % | HEIGHT: 66 IN | DIASTOLIC BLOOD PRESSURE: 68 MMHG

## 2018-09-14 DIAGNOSIS — Z01.818 PREOP EXAMINATION: Primary | ICD-10-CM

## 2018-09-14 DIAGNOSIS — Z86.39 H/O HYPERKALEMIA: ICD-10-CM

## 2018-09-14 ASSESSMENT — LIFESTYLE VARIABLES: TOBACCO_USE: 1

## 2018-09-14 NOTE — TELEPHONE ENCOUNTER
[employer] message sent to patient.     Ana Luisa Garcia RN  [facility]

## 2018-09-14 NOTE — H&P
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 considerations:   - RCRI : {PREOP REVISED CARDIAC INDEX (RCI):429854::\"No serious cardiac risks\"}.  *** risk of major adverse cardiac event.   - Anesthesia considerations:  Refer to PAC assessment in anesthesia records  - VTE risk:  - JONE # of risks ***/8 = ***  - Post-op delirium risk: ***  - If afib, [id number] score ***.  Risk category ***.    - Risk of PONV score = ***.  If > 2, anti-emetic intervention recommended.  - If on chronic opiates, morphine equivalent = *** (if > 60mg/24 hr--> needs pain team consult)    Patient was discussed with { PAC Providers:[id number]}.    PENELOPE Acuna CNP  Preoperative Assessment Center  [facility]  Clinic and Surgery Center  Phone: [phone]  Fax: [phone]  "

## 2018-09-14 NOTE — PROGRESS NOTES
Patient and her daughter presented to clinic today after their PAC appointment for her pre-op physical. They had questions about TCU placement after surgery. They were told that our , [facility] is the one that will help with this placement. They were asked to come up with at least 3 facilities that they would prefer to go to in case their 1st choice does not have any open beds. They stated that [facility] is their first choice but will work on finding additional facilities. They were told that a message was left with our  regarding this request. Dr. Gautam is in agreement with this request as well. Patient and her daughter were told to expect a call from Viky to further discuss her options.

## 2018-09-14 NOTE — PATIENT INSTRUCTIONS
Preparing for Your Surgery      Name:  Supriya Herr   MRN:  [id number]   :  1950   Today's Date:  2018     Arriving for surgery:  Surgery date:  18  Arrival time:  5:30AM  Please come to:     [facility] [address]  [address] [place]  [address]    - parking is available in front of [facility] from 5:15AM to 8:00PM. If you prefer, park your car in the Green Lot.    -Proceed to the 3rd floor, check in at the Adult Surgery Waiting Lounge. [phone]    If an escort is needed stop at the Information Desk in the lobby. Inform the information person that you are here for surgery. An escort to the Adult Surgery Waiting Lounge will be provided.        What can I eat or drink?  -  You may have solid food or milk products until 8 hours prior to your surgery. 18, 11:30PM  -  You may have water, apple juice or 7up/Sprite until 2 hours prior to your surgery. 18, 5:30AM    Which medicines can I take?   Take 20 units of Basaglar insulin the evening prior to surgery  -  Do NOT take these medications in the morning, the day of surgery:    Vitamin D  Furosemide(lasix)   Novolog Insulin Probiotic   Doxycycline    -  Please take these medications the day of surgery:    Albuterol-use if needed and bring day of surgery   Atorvastatin  Carvedilol       How do I prepare myself?  -  Take two showers: one the night before surgery; and one the morning of surgery.         Use Scrubcare or Hibiclens to wash from neck down.  You may use your own shampoo and conditioner. No other hair products.   -  Do NOT use lotion, powder, deodorant, or antiperspirant the day of your surgery.  -  Do NOT wear any makeup, fingernail polish or jewelry.  - Do not bring your own medications to the hospital, except for inhalers and eye drops.  -  Bring your ID and insurance card.    Questions or Concerns:  -If you have questions or concerns regarding the day of surgery,  please call [phone].     -For questions after surgery please call your surgeons office.           AFTER YOUR SURGERY  Breathing exercises   Breathing exercises help you recover faster. Take deep breaths and let the air out slowly. This will:     Help you wake up after surgery.    Help prevent complications like pneumonia.  Preventing complications will help you go home sooner.   Nausea and vomiting   You may feel sick to your stomach after surgery; if so, let your nurse know.    Pain control:  After surgery, you may have pain. Our goal is to help you manage your pain. Pain medicine will help you feel comfortable enough to do activities that will help you heal.  These activities may include breathing exercises, walking and physical therapy.   To help your health care team treat your pain we will ask: 1) If you have pain  2) where it is located 3) describe your pain in your words  Methods of pain control include medications given by mouth, vein or by nerve block for some surgeries.  Sequential Compression Device (SCD) or Pneumo Boots:  You may need to wear SCD S on your legs or feet. These are wraps connected to a machine that pumps in air and releases it. The repeated pumping helps prevent blood clots from forming.
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
## 2018-09-14 NOTE — MR AVS SNAPSHOT
After Visit Summary   2018    Supriya Herr    MRN: [id number]           Patient Information     Date Of Birth          1950        Visit Information        Provider Department      2018 10:30 AM Edilia García APRN CNP [facility]        Care Instructions    Preparing for Your Surgery      Name:  Supriya Herr   MRN:  [id number]   :  1950   Today's Date:  2018     Arriving for surgery:  Surgery date:  18  Arrival time:  5:30AM  Please come to:     [facility] [address]  [address] [place]  [address]    - parking is available in front of [facility] from 5:15AM to 8:00PM. If you prefer, park your car in the Green Lot.    -Proceed to the 3rd floor, check in at the Adult Surgery Waiting Lounge. [phone]    If an escort is needed stop at the Information Desk in the lobby. Inform the information person that you are here for surgery. An escort to the Adult Surgery Waiting Lounge will be provided.        What can I eat or drink?  -  You may have solid food or milk products until 8 hours prior to your surgery. 18, 11:30PM  -  You may have water, apple juice or 7up/Sprite until 2 hours prior to your surgery. 18, 5:30AM    Which medicines can I take?   Take 20 units of Basaglar insulin the evening prior to surgery  -  Do NOT take these medications in the morning, the day of surgery:    Vitamin D  Furosemide(lasix)   Novolog Insulin Probiotic   Doxycycline    -  Please take these medications the day of surgery:    Albuterol-use if needed and bring day of surgery   Atorvastatin  Carvedilol       How do I prepare myself?  -  Take two showers: one the night before surgery; and one the morning of surgery.         Use Scrubcare or Hibiclens to wash from neck down.  You may use your own shampoo and conditioner. No other hair products.   -  Do NOT use lotion, powder, deodorant,  or antiperspirant the day of your surgery.  -  Do NOT wear any makeup, fingernail polish or jewelry.  - Do not bring your own medications to the hospital, except for inhalers and eye drops.  -  Bring your ID and insurance card.    Questions or Concerns:  -If you have questions or concerns regarding the day of surgery, please call [phone].     -For questions after surgery please call your surgeons office.           AFTER YOUR SURGERY  Breathing exercises   Breathing exercises help you recover faster. Take deep breaths and let the air out slowly. This will:     Help you wake up after surgery.    Help prevent complications like pneumonia.  Preventing complications will help you go home sooner.   Nausea and vomiting   You may feel sick to your stomach after surgery; if so, let your nurse know.    Pain control:  After surgery, you may have pain. Our goal is to help you manage your pain. Pain medicine will help you feel comfortable enough to do activities that will help you heal.  These activities may include breathing exercises, walking and physical therapy.   To help your health care team treat your pain we will ask: 1) If you have pain  2) where it is located 3) describe your pain in your words  Methods of pain control include medications given by mouth, vein or by nerve block for some surgeries.  Sequential Compression Device (SCD) or Pneumo Boots:  You may need to wear SCD S on your legs or feet. These are wraps connected to a machine that pumps in air and releases it. The repeated pumping helps prevent blood clots from forming.           Follow-ups after your visit        Your next 10 appointments already scheduled     Sep 14, 2018 12:00 PM CDT   (Arrive by 11:45 AM)   PAC Anesthesia Consult with  Pac Anesthesiologist   [facility] ([facility])    [address] [place] [address] [phone]            Sep 26, 2018   Procedure  with Alvaro Gautam MD   [facility] [place], Same Day Surgery (--)    [address]  [facility] [place] [address]   [phone]            Oct 12, 2018  3:30 PM CDT   (Arrive by 3:15 PM)   Post-Op with  U Ortho Nurse   [facility] ([facility])    [address] [facility]  [facility] [address] [phone]            Nov 06, 2018 10:30 AM CST   (Arrive by 10:15 AM)   RETURN FOOT/ANKLE with Alvaro Gautam MD   [facility] ([facility])    [address] [place] [address]  [address] [facility] [address] [phone]            Nov 08, 2018 11:30 AM CST   (Arrive by 11:15 AM)   RETURN DIABETES with Arabella Kamara PA-C   [facility] ([facility])    [address] [place] [address]  [address] [facility] [address] [phone]            Nov 08, 2018 12:30 PM CST   (Arrive by 12:15 PM)   Office Visit with Brenden Blackwell [facility] Medication Therapy Management ([facility])    [address] [place] [id number] [phone]           Bring a current list of meds and any records pertaining to this visit. For Physicals, please bring immunization records and any forms needing to be filled out. Please arrive 10 minutes early to complete paperwork.            Jan 22, 2019  3:20 PM CST   CT CHEST W/O CONTRAST with UCCT2   [facility] Imaging Center CT ([facility])    [address] [place] [address] [phone]           How do I prepare for my exam? (Food and drink instructions) No Food and Drink Restrictions.  How do I prepare for my exam? (Other instructions) You do not need to do anything special to prepare for this exam. For a sinus scan: Use your nose spray (nasal decongestant spray) as directed.  What should I wear: Please wear loose clothing, such as a sweat suit or jogging  clothes. Avoid snaps, zippers and other metal. We may ask you to undress and put on a hospital gown.  How long does the exam take: Most scans take less than 20 minutes.  What should I bring: Please bring any scans or X-rays taken at other hospitals, if similar tests were done. Also bring a list of your medicines, including vitamins, minerals and over-the-counter drugs. It is safest to leave personal items at home.  Do I need a : No  is needed.  What do I need to tell my doctor? Be sure to tell your doctor: * If you have any allergies. * If there s any chance you are pregnant. * If you are breastfeeding.  What should I do after the exam: No restrictions, You may resume normal activities.  What is this test: A CT (computed tomography) scan is a series of pictures that allows us to look inside your body. The scanner creates images of the body in cross sections, much like slices of bread. This helps us see any problems more clearly.  Who should I call with questions: If you have any questions, please call the Imaging Department where you will have your exam. Directions, parking instructions, and other information is available on our website, [email or web address].            Jan 22, 2019  4:30 PM CST   (Arrive by 4:15 PM)   Return Visit with Ravin King MD   [facility] ([facility])    [address] [place] [address]  [address] [place] [address] [phone]              Who to contact     Please call your clinic at [phone] to:    Ask questions about your health    Make or cancel appointments    Discuss your medicines    Learn about your test results    Speak to your doctor            Additional Information About Your Visit        MyChart Information     [employer] gives you secure access to your electronic health record. If you see a primary care provider, you can also send messages to your care team and make appointments. If you have questions, please call  "your primary care clinic.  If you do not have a primary care provider, please call [phone] and they will assist you.      Flattr is an electronic gateway that provides easy, online access to your medical records. With Flattr, you can request a clinic appointment, read your test results, renew a prescription or communicate with your care team.     To access your existing account, please contact your [facility] or call [phone] for assistance.        Care EveryWhere ID     This is your Care EveryWhere ID. This could be used by other organizations to access your [facility] medical records  [id number]        Your Vitals Were     Pulse Temperature Respirations Height Pulse Oximetry BMI (Body Mass Index)    66 98.2  F (36.8  C) (Oral) 18 1.676 m (5' 6\") 100% 30.02 kg/m2       Blood Pressure from Last 3 Encounters:   09/14/18 120/68   09/13/18 139/69   08/21/18 130/64    Weight from Last 3 Encounters:   09/14/18 84.4 kg (186 lb)   09/13/18 83 kg (183 lb)   07/14/18 86.6 kg (191 lb)              Today, you had the following     No orders found for display         Today's Medication Changes          These changes are accurate as of 9/14/18 11:42 AM.  If you have any questions, ask your nurse or doctor.               These medicines have changed or have updated prescriptions.        Dose/Directions    acetaminophen 325 MG tablet   Commonly known as:  TYLENOL   This may have changed:  when to take this   Used for:  Diabetic ulcer of toe of right foot associated with type 2 diabetes mellitus, with other ulcer severity (H)        Dose:  650 mg   Take 2 tablets (650 mg) by mouth every 4 hours as needed for mild pain   Quantity:  100 tablet   Refills:  0       albuterol 108 (90 Base) MCG/ACT inhaler   Commonly known as:  PROAIR HFA/PROVENTIL HFA/VENTOLIN HFA   This may have changed:  when to take this   Used for:  Cough        Dose:  2 puff   Inhale 2 puffs into the lungs every 6 hours as " needed for shortness of breath / dyspnea or wheezing   Quantity:  3 Inhaler   Refills:  1       BASAGLAR 100 UNIT/ML injection   This may have changed:    - how much to take  - how to take this  - when to take this  - additional instructions   Used for:  Type 2 diabetes mellitus with diabetic neuropathy, with long-term current use of insulin (H)        INJECT 28 UNITS SUBCUTANEOUS AT BEDTIME   Quantity:  3 mL   Refills:  11       clindamycin 1 % lotion   Commonly known as:  CLEOCIN T   This may have changed:    - when to take this  - reasons to take this   Used for:  Intertrigo        Apply topically 2 times daily   Quantity:  60 mL   Refills:  3       fluocinonide 0.05 % ointment   Commonly known as:  LIDEX   This may have changed:    - when to take this  - reasons to take this  - additional instructions   Used for:  Adverse effect of drug, initial encounter        Apply sparingly to affected area twice daily as needed.  Do not apply to face.   Quantity:  60 g   Refills:  1       sertraline 25 MG tablet   Commonly known as:  ZOLOFT   This may have changed:  See the new instructions.   Used for:  NICOLE (generalized anxiety disorder)        TAKE 1 TABLET (25 MG) BY MOUTH DAILY   Quantity:  30 tablet   Refills:  3       Urea 20 % Crea cream   This may have changed:    - when to take this  - reasons to take this   Used for:  Xerosis cutis, Tyloma        Apply topically daily   Quantity:  85 g   Refills:  3       vitamin D 2000 units tablet   This may have changed:  when to take this   Used for:  Vitamin D deficiency        Dose:  2000 Units   Take 2,000 Units by mouth daily   Quantity:  100 tablet   Refills:  3                Primary Care Provider Office Phone # Fax #    Noam Jackson [phone]       [address] [address] [address]        Equal Access to Services     KALYANI WARREN AH: kristin Jane qaybta kaalmada adeegyada, waxay idiin hayaan  adama giangevercl fountain'aaheather ah. So [facility] [phone].    ATENCIÓN: Si carolina ochoa, tiene a santoro disposición servicios gratuitos de asistencia lingüística. Renuka al [phone].    We comply with applicable federal civil rights laws and [place] laws. We do not discriminate on the basis of race, color, national origin, age, disability, sex, sexual orientation, or gender identity.            Thank you!     Thank you for choosing [facility]  for your care. Our goal is always to provide you with excellent care. Hearing back from our patients is one way we can continue to improve our services. Please take a few minutes to complete the written survey that you may receive in the mail after your visit with us. Thank you!             Your Updated Medication List - Protect others around you: Learn how to safely use, store and throw away your medicines at www.disposemymeds.org.          This list is accurate as of 9/14/18 11:42 AM.  Always use your most recent med list.                   Brand Name Dispense Instructions for use Diagnosis    ACE/ARB/ARNI NOT PRESCRIBED (INTENTIONAL)      Please choose reason not prescribed, below    CKD (chronic kidney disease) stage 5, GFR less than 15 ml/min (H)       acetaminophen 325 MG tablet    TYLENOL    100 tablet    Take 2 tablets (650 mg) by mouth every 4 hours as needed for mild pain    Diabetic ulcer of toe of right foot associated with type 2 diabetes mellitus, with other ulcer severity (H)       albuterol 108 (90 Base) MCG/ACT inhaler    PROAIR HFA/PROVENTIL HFA/VENTOLIN HFA    3 Inhaler    Inhale 2 puffs into the lungs every 6 hours as needed for shortness of breath / dyspnea or wheezing    Cough       ATORVASTATIN CALCIUM PO      Take 20 mg by mouth every morning        BASAGLAR 100 UNIT/ML injection     3 mL    INJECT 28 UNITS SUBCUTANEOUS AT BEDTIME    Type 2 diabetes mellitus with diabetic neuropathy, with long-term current use of insulin (H)        carvedilol 12.5 MG tablet    COREG    60 tablet    Take 1 tablet (12.5 mg) by mouth 2 times daily (with meals)    Essential hypertension with goal blood pressure less than 140/90       clindamycin 1 % lotion    CLEOCIN T    60 mL    Apply topically 2 times daily    Intertrigo       doxycycline 100 MG capsule    VIBRAMYCIN    60 capsule    TAKE 1 CAPSULE (100 MG) BY MOUTH 2 TIMES DAILY    Hardware complicating wound infection, subsequent encounter       FLORAJEN3 PO      Take 1 tablet by mouth every morning        fluocinonide 0.05 % ointment    LIDEX    60 g    Apply sparingly to affected area twice daily as needed.  Do not apply to face.    Adverse effect of drug, initial encounter       furosemide 40 MG tablet    LASIX    30 tablet    Take 40 mg by mouth every morning TWICE A DAY IF NEEDED    Lymphedema of both lower extremities       insulin pen needle 31G X 6 MM    ULTICARE MINI    100 each    Use daily or as directed.    Type 2 diabetes, HbA1c goal < 7% (H)       NovoLOG FLEXPEN 100 UNIT/ML injection   Generic drug:  insulin aspart     15 mL    4 Units Inject 4 units SQ with breakfast, lunch and dinner. IF SNACK BETWEEN LUNCH AND DINNER SHE TAKES AN EXTRA 2 UNITS    Type 2 diabetes mellitus with hyperglycemia, with long-term current use of insulin (H)       ondansetron 4 MG tablet    ZOFRAN    18 tablet    Take 1 tablet (4 mg) by mouth every 12 hours as needed for nausea    Nausea       ONETOUCH ULTRA test strip   Generic drug:  blood glucose monitoring     400 strip    TEST YOUR BLOOD SUGAR 3-4 TIMES PER DAY.    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, subsequent encounter (H)       sertraline 25 MG tablet    ZOLOFT    30 tablet    TAKE 1 TABLET (25 MG) BY MOUTH DAILY    NICOLE (generalized anxiety disorder)       triamcinolone 0.1 % ointment    KENALOG    453.6 g    Apply topically 2 times daily as needed (itching) To body except for face, groin, and armpits    Drug rash       Urea 20 % Crea cream     85 g    Apply topically daily    Xerosis cutis, Tyloma       vitamin D 2000 units tablet     100 tablet    Take 2,000 Units by mouth daily    Vitamin D deficiency       * Notice:  This list has 3 medication(s) that are the same as other medications prescribed for you. Read the directions carefully, and ask your doctor or other care provider to review them with you.

## 2018-09-19 ENCOUNTER — PATIENT OUTREACH (OUTPATIENT)
Dept: CARE COORDINATION | Facility: CLINIC | Age: 68
End: 2018-09-19

## 2018-09-19 DIAGNOSIS — E11.628 DIABETIC FOOT INFECTION (H): Primary | ICD-10-CM

## 2018-09-19 DIAGNOSIS — L08.9 DIABETIC FOOT INFECTION (H): Primary | ICD-10-CM

## 2018-09-19 NOTE — PROGRESS NOTES
Clinic Care Coordination Contact  Care Team Conversations    Per chart review patient and daughter are requesting TCU stay after orthopedic foot surgery for hardware removal scheduled with Dr. Gautam for 9-26-18. Left ms for patient to call back with status update.     Carleen Valdes R.N.  Clinic Care Coordinator  [facility]  [phone]

## 2018-09-21 ENCOUNTER — PATIENT OUTREACH (OUTPATIENT)
Dept: CARE COORDINATION | Facility: CLINIC | Age: 68
End: 2018-09-21

## 2018-09-21 NOTE — PROGRESS NOTES
Clinic Care Coordination Contact  Care Team Conversations    See 9-19-18 RN CC encounter. Daughter returned my call. [facility]  is helping patient in reapplication process for medical assistance. Needs this asap as she is having foot surgery 9-26 and needs the medical assistance to be approved so she can recover in a TCU as daughter cannot take care of her since she works full time. Orthopedic SW [facility] will assist but she says not until two days before the surgery. She will research TCU's that may take MA pending. Daughter overwhelmed with process. Offered support. She will follow up with me with outcome.     Carleen Valdes R.N.  Clinic Care Coordinator  [facility]  [phone]

## 2018-09-24 ENCOUNTER — TELEPHONE (OUTPATIENT)
Dept: ONCOLOGY | Facility: CLINIC | Age: 68
End: 2018-09-24

## 2018-09-24 NOTE — TELEPHONE ENCOUNTER
Reached out to patient and spoke with daughter, Caroline, who answered the phone. We received a MyChart reply to our outreach letter about the program and patient's daughter responded with some questions. Called to clarify and explain program. Daughter was waiting for call on the other line, but said we could talk to the patient another time to see if she was interested.     Tobacco Treatment Team at the [facility] attempted to reach Ms. Herr on 9/24/2018 regarding the tobacco cessation program to help Ms. Herr to quit smoking. We will attempt to reach Ms. Herr another time.

## 2018-09-25 ENCOUNTER — OFFICE VISIT (OUTPATIENT)
Dept: DERMATOLOGY | Facility: CLINIC | Age: 68
End: 2018-09-25
Payer: MEDICARE

## 2018-09-25 ENCOUNTER — TELEPHONE (OUTPATIENT)
Dept: CARE COORDINATION | Facility: CLINIC | Age: 68
End: 2018-09-25

## 2018-09-25 DIAGNOSIS — Z01.818 PREOP EXAMINATION: ICD-10-CM

## 2018-09-25 DIAGNOSIS — R21 RASH: Primary | ICD-10-CM

## 2018-09-25 DIAGNOSIS — Z86.39 H/O HYPERKALEMIA: ICD-10-CM

## 2018-09-25 LAB
ANION GAP SERPL CALCULATED.3IONS-SCNC: 7 MMOL/L (ref 3–14)
BUN SERPL-MCNC: 70 MG/DL (ref 7–30)
CALCIUM SERPL-MCNC: 8.4 MG/DL (ref 8.5–10.1)
CHLORIDE SERPL-SCNC: 114 MMOL/L (ref 94–109)
CO2 SERPL-SCNC: 23 MMOL/L (ref 20–32)
CREAT SERPL-MCNC: 3.02 MG/DL (ref 0.52–1.04)
GFR SERPL CREATININE-BSD FRML MDRD: 15 ML/MIN/1.7M2
GLUCOSE SERPL-MCNC: 155 MG/DL (ref 70–99)
POTASSIUM SERPL-SCNC: 4.3 MMOL/L (ref 3.4–5.3)
SODIUM SERPL-SCNC: 144 MMOL/L (ref 133–144)

## 2018-09-25 RX ORDER — TRIAMCINOLONE ACETONIDE 1 MG/G
CREAM TOPICAL 2 TIMES DAILY
Qty: 80 G | Refills: 1 | Status: ON HOLD | OUTPATIENT
Start: 2018-09-25 | End: 2018-09-26

## 2018-09-25 ASSESSMENT — PAIN SCALES - GENERAL: PAINLEVEL: NO PAIN (0)

## 2018-09-25 NOTE — NURSING NOTE
Dermatology Rooming Note    Supriya Herr's goals for this visit include:   Chief Complaint   Patient presents with     Derm Problem     Supriya is here today for a rash on her arms and hands. - 1 month      FABIEN Oneill
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
## 2018-09-26 ENCOUNTER — TELEPHONE (OUTPATIENT)
Dept: ORTHOPEDICS | Facility: CLINIC | Age: 68
End: 2018-09-26

## 2018-09-26 ENCOUNTER — HOSPITAL ENCOUNTER (OUTPATIENT)
Facility: CLINIC | Age: 68
Discharge: NURSING, REHAB OR LONG-TERM CARE | End: 2018-09-26
Attending: ORTHOPAEDIC SURGERY | Admitting: ORTHOPAEDIC SURGERY
Payer: MEDICARE

## 2018-09-26 ENCOUNTER — ANESTHESIA (OUTPATIENT)
Dept: SURGERY | Facility: CLINIC | Age: 68
End: 2018-09-26
Payer: MEDICARE

## 2018-09-26 VITALS
RESPIRATION RATE: 18 BRPM | TEMPERATURE: 98.1 F | HEIGHT: 69 IN | WEIGHT: 183.2 LBS | OXYGEN SATURATION: 98 % | DIASTOLIC BLOOD PRESSURE: 68 MMHG | SYSTOLIC BLOOD PRESSURE: 143 MMHG | BODY MASS INDEX: 27.13 KG/M2

## 2018-09-26 DIAGNOSIS — M79.671 RIGHT FOOT PAIN: Primary | ICD-10-CM

## 2018-09-26 LAB
GLUCOSE BLDC GLUCOMTR-MCNC: 111 MG/DL (ref 70–99)
GLUCOSE BLDC GLUCOMTR-MCNC: 127 MG/DL (ref 70–99)
GLUCOSE BLDC GLUCOMTR-MCNC: 48 MG/DL (ref 70–99)
GLUCOSE BLDC GLUCOMTR-MCNC: 67 MG/DL (ref 70–99)
GLUCOSE BLDC GLUCOMTR-MCNC: 71 MG/DL (ref 70–99)
GLUCOSE BLDC GLUCOMTR-MCNC: 75 MG/DL (ref 70–99)
GLUCOSE BLDC GLUCOMTR-MCNC: 81 MG/DL (ref 70–99)

## 2018-09-26 PROCEDURE — 71000027 ZZH RECOVERY PHASE 2 EACH 15 MINS: Performed by: ORTHOPAEDIC SURGERY

## 2018-09-26 PROCEDURE — 25000128 H RX IP 250 OP 636: Performed by: ORTHOPAEDIC SURGERY

## 2018-09-26 PROCEDURE — 25000128 H RX IP 250 OP 636: Performed by: NURSE ANESTHETIST, CERTIFIED REGISTERED

## 2018-09-26 PROCEDURE — 37000009 ZZH ANESTHESIA TECHNICAL FEE, EACH ADDTL 15 MIN: Performed by: ORTHOPAEDIC SURGERY

## 2018-09-26 PROCEDURE — 25000132 ZZH RX MED GY IP 250 OP 250 PS 637: Mod: GY | Performed by: ANESTHESIOLOGY

## 2018-09-26 PROCEDURE — 36000051 ZZH SURGERY LEVEL 2 1ST 30 MIN - UMMC: Performed by: ORTHOPAEDIC SURGERY

## 2018-09-26 PROCEDURE — 36000053 ZZH SURGERY LEVEL 2 EA 15 ADDTL MIN - UMMC: Performed by: ORTHOPAEDIC SURGERY

## 2018-09-26 PROCEDURE — 71000015 ZZH RECOVERY PHASE 1 LEVEL 2 EA ADDTL HR: Performed by: ORTHOPAEDIC SURGERY

## 2018-09-26 PROCEDURE — 25000566 ZZH SEVOFLURANE, EA 15 MIN: Performed by: ORTHOPAEDIC SURGERY

## 2018-09-26 PROCEDURE — 71000014 ZZH RECOVERY PHASE 1 LEVEL 2 FIRST HR: Performed by: ORTHOPAEDIC SURGERY

## 2018-09-26 PROCEDURE — 25000125 ZZHC RX 250: Performed by: NURSE ANESTHETIST, CERTIFIED REGISTERED

## 2018-09-26 PROCEDURE — 82962 GLUCOSE BLOOD TEST: CPT

## 2018-09-26 PROCEDURE — A9270 NON-COVERED ITEM OR SERVICE: HCPCS | Mod: GY | Performed by: ANESTHESIOLOGY

## 2018-09-26 PROCEDURE — 37000008 ZZH ANESTHESIA TECHNICAL FEE, 1ST 30 MIN: Performed by: ORTHOPAEDIC SURGERY

## 2018-09-26 PROCEDURE — 40000171 ZZH STATISTIC PRE-PROCEDURE ASSESSMENT III: Performed by: ORTHOPAEDIC SURGERY

## 2018-09-26 PROCEDURE — 25000125 ZZHC RX 250: Performed by: ORTHOPAEDIC SURGERY

## 2018-09-26 PROCEDURE — 27210794 ZZH OR GENERAL SUPPLY STERILE: Performed by: ORTHOPAEDIC SURGERY

## 2018-09-26 PROCEDURE — 27110028 ZZH OR GENERAL SUPPLY NON-STERILE: Performed by: ORTHOPAEDIC SURGERY

## 2018-09-26 RX ORDER — PROPOFOL 10 MG/ML
INJECTION, EMULSION INTRAVENOUS PRN
Status: DISCONTINUED | OUTPATIENT
Start: 2018-09-26 | End: 2018-09-26

## 2018-09-26 RX ORDER — FENTANYL CITRATE 50 UG/ML
INJECTION, SOLUTION INTRAMUSCULAR; INTRAVENOUS PRN
Status: DISCONTINUED | OUTPATIENT
Start: 2018-09-26 | End: 2018-09-26

## 2018-09-26 RX ORDER — NALOXONE HYDROCHLORIDE 0.4 MG/ML
.1-.4 INJECTION, SOLUTION INTRAMUSCULAR; INTRAVENOUS; SUBCUTANEOUS
Status: DISCONTINUED | OUTPATIENT
Start: 2018-09-26 | End: 2018-09-26 | Stop reason: HOSPADM

## 2018-09-26 RX ORDER — HYDROCODONE BITARTRATE AND ACETAMINOPHEN 5; 325 MG/1; MG/1
1 TABLET ORAL
Status: DISCONTINUED | OUTPATIENT
Start: 2018-09-26 | End: 2018-09-26 | Stop reason: HOSPADM

## 2018-09-26 RX ORDER — CITRIC ACID/SODIUM CITRATE 334-500MG
30 SOLUTION, ORAL ORAL ONCE
Status: DISCONTINUED | OUTPATIENT
Start: 2018-09-26 | End: 2018-09-26 | Stop reason: HOSPADM

## 2018-09-26 RX ORDER — FAMOTIDINE 20 MG/1
20 TABLET, FILM COATED ORAL DAILY
Qty: 60 TABLET | Refills: 0 | DISCHARGE
Start: 2018-09-26 | End: 2018-12-15

## 2018-09-26 RX ORDER — ONDANSETRON 2 MG/ML
4 INJECTION INTRAMUSCULAR; INTRAVENOUS EVERY 30 MIN PRN
Status: DISCONTINUED | OUTPATIENT
Start: 2018-09-26 | End: 2018-09-26 | Stop reason: HOSPADM

## 2018-09-26 RX ORDER — SODIUM CHLORIDE, SODIUM LACTATE, POTASSIUM CHLORIDE, CALCIUM CHLORIDE 600; 310; 30; 20 MG/100ML; MG/100ML; MG/100ML; MG/100ML
INJECTION, SOLUTION INTRAVENOUS CONTINUOUS
Status: DISCONTINUED | OUTPATIENT
Start: 2018-09-26 | End: 2018-09-26 | Stop reason: HOSPADM

## 2018-09-26 RX ORDER — CLINDAMYCIN PHOSPHATE 900 MG/50ML
900 INJECTION, SOLUTION INTRAVENOUS SEE ADMIN INSTRUCTIONS
Status: DISCONTINUED | OUTPATIENT
Start: 2018-09-26 | End: 2018-09-26 | Stop reason: HOSPADM

## 2018-09-26 RX ORDER — FENTANYL CITRATE 50 UG/ML
25-50 INJECTION, SOLUTION INTRAMUSCULAR; INTRAVENOUS
Status: DISCONTINUED | OUTPATIENT
Start: 2018-09-26 | End: 2018-09-26 | Stop reason: HOSPADM

## 2018-09-26 RX ORDER — ONDANSETRON 2 MG/ML
INJECTION INTRAMUSCULAR; INTRAVENOUS PRN
Status: DISCONTINUED | OUTPATIENT
Start: 2018-09-26 | End: 2018-09-26

## 2018-09-26 RX ORDER — HYDROMORPHONE HYDROCHLORIDE 1 MG/ML
.3-.5 INJECTION, SOLUTION INTRAMUSCULAR; INTRAVENOUS; SUBCUTANEOUS EVERY 10 MIN PRN
Status: DISCONTINUED | OUTPATIENT
Start: 2018-09-26 | End: 2018-09-26 | Stop reason: HOSPADM

## 2018-09-26 RX ORDER — ACETAMINOPHEN 325 MG/1
975 TABLET ORAL ONCE
Status: COMPLETED | OUTPATIENT
Start: 2018-09-26 | End: 2018-09-26

## 2018-09-26 RX ORDER — HYDROXYZINE HYDROCHLORIDE 10 MG/1
10 TABLET, FILM COATED ORAL
Status: DISCONTINUED | OUTPATIENT
Start: 2018-09-26 | End: 2018-09-26 | Stop reason: HOSPADM

## 2018-09-26 RX ORDER — EPHEDRINE SULFATE 50 MG/ML
INJECTION, SOLUTION INTRAMUSCULAR; INTRAVENOUS; SUBCUTANEOUS PRN
Status: DISCONTINUED | OUTPATIENT
Start: 2018-09-26 | End: 2018-09-26

## 2018-09-26 RX ORDER — BUPIVACAINE HYDROCHLORIDE 5 MG/ML
INJECTION, SOLUTION PERINEURAL PRN
Status: DISCONTINUED | OUTPATIENT
Start: 2018-09-26 | End: 2018-09-26 | Stop reason: HOSPADM

## 2018-09-26 RX ORDER — LIDOCAINE 40 MG/G
CREAM TOPICAL
Status: DISCONTINUED | OUTPATIENT
Start: 2018-09-26 | End: 2018-09-26 | Stop reason: HOSPADM

## 2018-09-26 RX ORDER — CLINDAMYCIN PHOSPHATE 900 MG/50ML
900 INJECTION, SOLUTION INTRAVENOUS
Status: COMPLETED | OUTPATIENT
Start: 2018-09-26 | End: 2018-09-26

## 2018-09-26 RX ORDER — ONDANSETRON 4 MG/1
4 TABLET, ORALLY DISINTEGRATING ORAL EVERY 30 MIN PRN
Status: DISCONTINUED | OUTPATIENT
Start: 2018-09-26 | End: 2018-09-26 | Stop reason: HOSPADM

## 2018-09-26 RX ORDER — HYDROXYZINE HYDROCHLORIDE 10 MG/1
10 TABLET, FILM COATED ORAL EVERY 4 HOURS PRN
Qty: 30 TABLET | Refills: 0 | DISCHARGE
Start: 2018-09-26 | End: 2018-11-01

## 2018-09-26 RX ORDER — CITRIC ACID/SODIUM CITRATE 334-500MG
30 SOLUTION, ORAL ORAL ONCE
Status: COMPLETED | OUTPATIENT
Start: 2018-09-26 | End: 2018-09-26

## 2018-09-26 RX ORDER — SODIUM CHLORIDE, SODIUM LACTATE, POTASSIUM CHLORIDE, CALCIUM CHLORIDE 600; 310; 30; 20 MG/100ML; MG/100ML; MG/100ML; MG/100ML
INJECTION, SOLUTION INTRAVENOUS CONTINUOUS PRN
Status: DISCONTINUED | OUTPATIENT
Start: 2018-09-26 | End: 2018-09-26

## 2018-09-26 RX ORDER — MEPERIDINE HYDROCHLORIDE 25 MG/ML
12.5 INJECTION INTRAMUSCULAR; INTRAVENOUS; SUBCUTANEOUS
Status: DISCONTINUED | OUTPATIENT
Start: 2018-09-26 | End: 2018-09-26 | Stop reason: HOSPADM

## 2018-09-26 RX ORDER — HYDROCODONE BITARTRATE AND ACETAMINOPHEN 5; 325 MG/1; MG/1
TABLET ORAL
Qty: 20 TABLET | Refills: 0 | Status: SHIPPED | OUTPATIENT
Start: 2018-09-26 | End: 2018-11-01

## 2018-09-26 RX ORDER — LIDOCAINE HYDROCHLORIDE 20 MG/ML
INJECTION, SOLUTION INFILTRATION; PERINEURAL PRN
Status: DISCONTINUED | OUTPATIENT
Start: 2018-09-26 | End: 2018-09-26

## 2018-09-26 RX ADMIN — SODIUM CHLORIDE, POTASSIUM CHLORIDE, SODIUM LACTATE AND CALCIUM CHLORIDE: 600; 310; 30; 20 INJECTION, SOLUTION INTRAVENOUS at 07:45

## 2018-09-26 RX ADMIN — Medication 10 MG: at 08:19

## 2018-09-26 RX ADMIN — SODIUM CITRATE AND CITRIC ACID MONOHYDRATE 30 ML: 500; 334 SOLUTION ORAL at 07:20

## 2018-09-26 RX ADMIN — MIDAZOLAM 1 MG: 1 INJECTION INTRAMUSCULAR; INTRAVENOUS at 07:47

## 2018-09-26 RX ADMIN — FENTANYL CITRATE 50 MCG: 50 INJECTION, SOLUTION INTRAMUSCULAR; INTRAVENOUS at 07:58

## 2018-09-26 RX ADMIN — ACETAMINOPHEN 975 MG: 325 TABLET, FILM COATED ORAL at 07:14

## 2018-09-26 RX ADMIN — PROPOFOL 200 MG: 10 INJECTION, EMULSION INTRAVENOUS at 07:58

## 2018-09-26 RX ADMIN — Medication 10 MG: at 08:00

## 2018-09-26 RX ADMIN — LIDOCAINE HYDROCHLORIDE 100 MG: 20 INJECTION, SOLUTION INFILTRATION; PERINEURAL at 07:58

## 2018-09-26 RX ADMIN — CLINDAMYCIN PHOSPHATE 900 MG: 18 INJECTION, SOLUTION INTRAVENOUS at 08:04

## 2018-09-26 RX ADMIN — Medication 10 MG: at 08:11

## 2018-09-26 RX ADMIN — ONDANSETRON 4 MG: 2 INJECTION INTRAMUSCULAR; INTRAVENOUS at 08:23

## 2018-09-26 NOTE — ANESTHESIA CARE TRANSFER NOTE
Patient: Supriya Herr    Procedure(s):  Right Foot Removal Of Hardware, Sesamoidectomy With Second Metatarsal Head Excision  - Wound Class: I-Clean   - Wound Class: I-Clean    Diagnosis: Right Foot Infection, Chronic Ulcer   Diagnosis Additional Information: No value filed.    Anesthesia Type:   General, LMA     Note:  Airway :Face Mask  Patient transferred to:PACU  Handoff Report: Identifed the Patient, Identified the Reponsible Provider, Reviewed the pertinent medical history, Discussed the surgical course, Reviewed Intra-OP anesthesia mangement and issues during anesthesia, Set expectations for post-procedure period and Allowed opportunity for questions and acknowledgement of understanding      Vitals: (Last set prior to Anesthesia Care Transfer)    CRNA VITALS  9/26/2018 0817 - 9/26/2018 0851      9/26/2018             Pulse: 68    SpO2: 100 %                Electronically Signed By: PENELOPE Smalls CRNA  September 26, 2018  8:51 AM

## 2018-09-26 NOTE — BRIEF OP NOTE
[facility] Brief Operative Note    Patient: Supriya Herr  : 1950  Date of Service: 2018 11:05 AM      Pre-operative diagnosis: Right Foot Infection, Chronic Ulcer    Post-operative diagnosis Right Foot Infection, Chronic Ulcer    Procedure: Procedure(s):  Right Foot Removal Of Hardware, Sesamoidectomy With Second Metatarsal Head Excision  - Wound Class: I-Clean   - Wound Class: I-Clean   Surgeon: Dr. Alvaro Gautam MD   Assistants(s): Karlene Saha PA-C   Estimated blood loss: 20 ml's    Specimens: None   Findings: None       Plan:  Same Day surgery discharge to TCU once criteria met.  CAM boot to remain on right  lower extremity and NWB at all times.  Norco for pain.  No dressing change on own.  Leave dressing on until 2 weeks follow up appointment.  F/U in clinic in 2 weeks    I was asked by Dr. Gautam to assist with surgery. I positioned and prepped the patient. I retracted soft tissue.   I suctioned fluids when needed. I provided traction for dissection. I helped to ligate blood vessels. I helped Dr. Gautam identify and protect important structures. The procedure was medically necessary for an assistant because Dr. Gautam needed the operative exposure and assistance that I provided. This allowed him to safely and efficiently operate. It was also important that I help ligate blood vessels to maintain hemostasis and reduce the bleeding risk. I helped with the closure of the operative incisions as well as helping with the boot/cast/splint.  The assistance that I provided reduced operative time which meant less general anesthetic for the patient. No qualified residents were available to assist.    Karlene Saha PA-C

## 2018-09-26 NOTE — OR NURSING
Blood sugar 48 at 0621.  Dr. Nance at bedside.  Apple juice 4 oz given.  Recheck blood sugar at 0650 75.  Dr. Ogden aware.  Blood sugar 71 at 0728.  [facility] notified.

## 2018-09-26 NOTE — TELEPHONE ENCOUNTER
[facility] Call Center    Phone Message    May a detailed message be left on voicemail: no    Reason for Call: Other: Viky at [facility] needing call back asap. Pt came to them today for her after care after her surgery today with Dr. Gautam and none of the pt's orders for medications are signed. Pt needs to start these tomorrow morning     Action Taken: Message routed to:  [facility] ([facility] ortho clinic

## 2018-09-26 NOTE — OR NURSING
Glucose checked at 0925. Result 67. Dr. Ogden notified. Will give apple juice and recheck glucose.

## 2018-09-26 NOTE — ANESTHESIA POSTPROCEDURE EVALUATION
Patient: Supriya Herr    Procedure(s):  Right Foot Removal Of Hardware, Sesamoidectomy With Second Metatarsal Head Excision  - Wound Class: I-Clean   - Wound Class: I-Clean    Diagnosis:Right Foot Infection, Chronic Ulcer   Diagnosis Additional Information: No value filed.    Anesthesia Type:  General, LMA    Note:  Anesthesia Post Evaluation    Patient location during evaluation: PACU  Patient participation: Able to fully participate in evaluation  Level of consciousness: awake and alert  Pain management: adequate  Airway patency: patent  Cardiovascular status: acceptable  Respiratory status: acceptable  Hydration status: acceptable  PONV: none     Anesthetic complications: None    Comments: Few episodes of hypoglycemia pre-op and post-op that promptly responded to oral liquid sweet intake.         Last vitals:  Vitals:    09/26/18 0915 09/26/18 0930 09/26/18 0945   BP: 143/69 153/67 148/60   Resp: 18 13 10   Temp: 36.7  C (98.1  F)     SpO2: 100% 99% 99%         Electronically Signed By: Dorene Ogden MD  September 26, 2018  10:08 AM

## 2018-09-26 NOTE — DISCHARGE INSTRUCTIONS
[facility]   Adult Discharge Orders & Instructions     For 24 hours after surgery    1. Get plenty of rest.  A responsible adult must stay with you for at least 24 hours after you leave the hospital.   2. Do not drive or use heavy equipment.  If you have weakness or tingling, don't drive or use heavy equipment until this feeling goes away.  3. Do not drink alcohol.  4. Avoid strenuous or risky activities.  Ask for help when climbing stairs.   5. You may feel lightheaded.  IF so, sit for a few minutes before standing.  Have someone help you get up.   6. If you have nausea (feel sick to your stomach): Drink only clear liquids such as apple juice, ginger ale, broth or 7-Up.  Rest may also help.  Be sure to drink enough fluids.  Move to a regular diet as you feel able.  7. You may have a slight fever. Call the doctor if your fever is over 100 F (37.7 C) (taken under the tongue) or lasts longer than 24 hours.  8. You may have a dry mouth, a sore throat, muscle aches or trouble sleeping.  These should go away after 24 hours.  9. Do not make important or legal decisions.   Call your doctor for any of the followin.  Signs of infection (fever, growing tenderness at the surgery site, a large amount of drainage or bleeding, severe pain, foul-smelling drainage, redness, swelling).    2. It has been over 8 to 10 hours since surgery and you are still not able to urinate (pass water).    3.  Headache for over 24 hours.    4.  Numbness, tingling or weakness the day after surgery (if you had spinal anesthesia).  To contact a doctor, call ________________________________________ POSTOPERATIVE INSTRUCTIONS FOOT AND ANKLE SURGERY   Alvaro Gautam M.D.    Crutches/walker: You must use crutches/walker when up until instructed otherwise.    Bloody drainage on the dressing cast: This is normal. Cast and dressing material act as a sponge.    Bruising of toes: It is normal to experience some bruising in the toes after  surgery.    Driving: For surgery on your right foot/ankle, you may not drive unless otherwise instructed. For surgery on the left foot/ankle, driving is not advised for the first week.    Elevate foot/ankle: Keep your operated foot/ankle elevated at or above your heart 95% of the day the first 24-48 hours following surgery (only use the bathroom). Excessive swelling of the operative foot/ankle causes increased pain, problems with the incision healing and problems with the surgical repair healing.  o The foot should be elevated when you sit to eat.  o Avoid sitting with your foot hanging down.  o You may lie on your side for periods during the day/night; however you should still keep       your foot elevated.  It is not advisable to lie on your stomach.    Ice:  Use ice on foot/ankle over dressing/cast for 2-3 days or more.    Lightheadedness when you get up:  To prevent lightheadedness, sit up and let your foot hang down for 3-5 minutes BEFORE you get up.    Can you move your toes? Only if you did not have surgery on your toes.  It is important to actively wiggle you toes at least 5-10 minutes each hour. This will help prevent blood clots.    Dressing/Cast: Keep clean and dry.    Bathing:  Take a sponge bath/tub instead of a shower.  If you choose to shower, cover the cast/dressing with a waterproof covering (heavy duty plastic bag, or purchased shower cover) and sit on a chair/stool. Have someone available to help you if needed.    Cam Walker:  If issued, wear the CAM walker (boot) 24 hours/day until your follow up appointment unless instructed otherwise.    Pain Medications:    o Take with food to avoid nausea related to the medication.  o Take the medication regularly as prescribed to avoid pain becoming difficult to manage.  Generally your pain should improve by the third day. It is not unusual for the pain to be worse a day or two after surgery than it is on the day of surgery.  o Do not take the pain  medication more frequently than prescribed.  If the medication does not relieve the pain or does not last the prescribed time, contact your physician.  o As your pain lessens you may diminish your pain medicine intake by taking one pain pill with one plain Tylenol.  (Unless you have a medical condition restricting the use of Tylenol)  o Tylenol is an ingredient in some pain medications.  To avoid liver damage, it must be limited to 8 tablets (500 mg or Extra Strength) or 12 tablets (325 mg or regular strength) per day.  o Do not supplement your pain medication with any type of anti-inflammatory medication (Ibuprofen, Motrin, Aleve, Celebrex, etc.) without first contacting your physician.  o If you need a refill, please call to allow 48-72 hours before you run out of medication, to avoid running out during evening, weekend or holiday hours.    Fever: It is not unusual to run a low grade fever after surgery.  If your temperature is elevated above 100 degrees, lasts for longer than 24 hours or is questionable in any way, contact the physician.    Constipation: all pain medications along with inactivity can cause constipation.  o Increase your fluid intake to AT LEAST 1 quart of water per day.  o Increase your dietary fiber (Bran cereals, whole grains, fruits, and vegetables.) Eat the  P  fruits: peaches, plums, pears, prunes. Anise/black licorice is also known to act as a natural laxative.  o Avoid the BRAT diet, (bananas, rice, applesauce, and toast) as it may increase constipation.  o If no bowel movement occurs 3 days following surgery, a mild over the counter laxative is recommended.      Be sure you have your post-operative follow up appointments made.  Call    [facility] appointment and triage number  Safety Tips for Using Crutches    Crutch Fit:    Assume good standing posture with shoulders relaxed and crutch tips 6-8 inches out from the side of the foot.    The underarm pad should fall  "2-3 fingers width below the armpit.    The handgrip is positioned level with the wrist to allow 30  flexion at the elbow.    Safety Tips:    Bear weight on your hands, not on your armpits.    Do not add extra padding to the underarm pad. This will, in effect, lengthen the crutches and increase risk of nerve injury.    Wear flat, properly fitting shoes. Do not walk in stocking feet, high heels or slippers.    Household hazards:  --Throw rugs should be removed from floors.  --Stairs should be cleared of obstacles.  --Use extra caution on slippery, highly polished, littered or uneven floor surfaces.  --Check for electric cords.    Check crutch tips for excessive wear and keep wing nuts tight.    While walking, look forward with  head up  and  eyes open.  Take equal length steps.    Use BOTH crutches.    Stairs Sequence:    UP: \"Good\" leg first, followed by  bad  leg, then crutches.    DOWN: Crutches, followed by  bad  leg, \"good\" leg.     Walking with Crutches:    Move both crutches forward at the same time.    Non-Weight Bearing (NWB):  Hold the involved leg up and swing through the crutches with the involved leg. The involved leg does not touch the floor.    Toe Touch Weight Bearing (TTWB): Move the involved leg forward. Rest it lightly on the floor for balance only. Step through the crutches with the uninvolved leg.    Partial Weight Bearing (PWB): Move the involved leg forward. Step down the weight of the leg only.  Step through the crutches with the uninvolved leg.    Weight Bearing As Tolerated (WBAT): Move the involved leg forward. Put as much pressure through the involved leg as you can tolerate comfortably. Then step through the crutches with the uninvolved leg.    Rev. 4/2014        "

## 2018-09-26 NOTE — IP AVS SNAPSHOT
MRN:[id number]                      After Visit Summary   9/26/2018    Supriya Herr    MRN: [id number]           Thank you!     Thank you for choosing [facility] for your care. Our goal is always to provide you with excellent care. Hearing back from our patients is one way we can continue to improve our services. Please take a few minutes to complete the written survey that you may receive in the mail after you visit with us. Thank you!        Patient Information     Date Of Birth          1950        About your hospital stay     You were admitted on:  September 26, 2018 You last received care in the:   PACU    You were discharged on:  September 26, 2018       Who to Call     For medical emergencies, please call 911.  For non-urgent questions about your medical care, please call your primary care provider or clinic, [phone]  For questions related to your surgery, please call your surgery clinic        Attending Provider     Provider Alvaro Danielson MD Orthopedics       Primary Care Provider Office Phone # Fax #    Noam Luis Jackson [phone]      After Care Instructions     Activity - Up with assistive device           Advance Diet as Tolerated       Follow this diet upon discharge: Regular            Discharge Instructions       Patient to arrange for return to clinic appointment in two weeks.            Elevate affected extremity           General info for SNF       Length of Stay Estimate: Short Term Care: Estimated # of Days <30  Condition at Discharge: Stable  Level of care:skilled   Rehabilitation Potential: Good  Admission H&P remains valid and up-to-date: Yes  Recent Chemotherapy: N/A  Use Nursing Home Standing Orders: Yes            Glucose monitor nursing POCT       Before meals and at bedtime            Ice to affected area       Ice pack to affected area PRN (as needed).            Mantoux instructions       Give two-step  Mantoux (PPD) Per Facility Policy Yes            No dressing change       until follow up clinic appointment.            No weight bearing           Weight bearing status       NWB right lower extremity            Wound care       Site:   Right lower extremity  Instructions:  Leave boot and dressings on until 2 week follow up.  Do not remove.                  Your next 10 appointments already scheduled     Oct 12, 2018  3:30 PM CDT   (Arrive by 3:15 PM)   Post-Op with  U Ortho Nurse   [facility] ([facility] [place])    [address]  4th Floor  [facility] [address] [phone]            Oct 17, 2018  2:30 PM CDT   Lab with  LAB   [facility] ([facility])    [address] [place] [address]  [address] [facility] [address] [phone]            Oct 17, 2018  4:00 PM CDT   (Arrive by 3:30 PM)   Return Visit with Kathryn Basilio MD   [facility] Nephrology ([facility])    [address] [place] [address]  [address]  [facility] [address] [phone]            Nov 06, 2018 10:30 AM CST   (Arrive by 10:15 AM)   RETURN FOOT/ANKLE with Alvaro Gautam MD   [facility] ([facility])    [address]  4th [facility] [address] [phone]            Nov 08, 2018 11:30 AM CST   (Arrive by 11:15 AM)   RETURN DIABETES with Arabella Kamara PA-C   [facility] Endocrinology ([facility])    [address] [place] [address]  3rd [facility] [address] [phone]            Nov 08, 2018 12:30 PM CST   (Arrive by 12:15 PM)   Office Visit with Brenden Blackwell RPH   [facility] Medication Therapy Management ([facility])    [address]  [address] [facility] [address] [phone]           Bring a current list of meds and any records pertaining to this visit. For Physicals, please bring immunization  records and any forms needing to be filled out. Please arrive 10 minutes early to complete paperwork.            Jan 22, 2019  3:20 PM CST   CT CHEST W/O CONTRAST with UCCT2   [facility] CT ([facility])    [address]  [address] [facility] [address] [phone]           How do I prepare for my exam? (Food and drink instructions) No Food and Drink Restrictions.  How do I prepare for my exam? (Other instructions) You do not need to do anything special to prepare for this exam. For a sinus scan: Use your nose spray (nasal decongestant spray) as directed.  What should I wear: Please wear loose clothing, such as a sweat suit or jogging clothes. Avoid snaps, zippers and other metal. We may ask you to undress and put on a hospital gown.  How long does the exam take: Most scans take less than 20 minutes.  What should I bring: Please bring any scans or X-rays taken at other hospitals, if similar tests were done. Also bring a list of your medicines, including vitamins, minerals and over-the-counter drugs. It is safest to leave personal items at home.  Do I need a : No  is needed.  What do I need to tell my doctor? Be sure to tell your doctor: * If you have any allergies. * If there s any chance you are pregnant. * If you are breastfeeding.  What should I do after the exam: No restrictions, You may resume normal activities.  What is this test: A CT (computed tomography) scan is a series of pictures that allows us to look inside your body. The scanner creates images of the body in cross sections, much like slices of bread. This helps us see any problems more clearly.  Who should I call with questions: If you have any questions, please call the Imaging Department where you will have your exam. Directions, parking instructions, and other information is available on our website, [email or web address].            Jan 22, 2019  4:30 PM CST   (Arrive by 4:15 PM)    Return Visit with Ravin King MD   [facility] ([facility])    [address]  [address]  [facility] [address] [phone]              Additional Services     Occupational Therapy Adult Consult       Evaluate and treat as clinically indicated.    Reason:  Non weight bearing right lower extremity s/p Right foot second metatarsal resection            Physical Therapy Adult Consult       Evaluate and treat as clinically indicated.    Reason:  Non weight bearing right lower extremity s/p Right foot second metatarsal resection                  Further instructions from your care team       [facility]   Adult Discharge Orders & Instructions     For 24 hours after surgery    1. Get plenty of rest.  A responsible adult must stay with you for at least 24 hours after you leave the hospital.   2. Do not drive or use heavy equipment.  If you have weakness or tingling, don't drive or use heavy equipment until this feeling goes away.  3. Do not drink alcohol.  4. Avoid strenuous or risky activities.  Ask for help when climbing stairs.   5. You may feel lightheaded.  IF so, sit for a few minutes before standing.  Have someone help you get up.   6. If you have nausea (feel sick to your stomach): Drink only clear liquids such as apple juice, ginger ale, broth or 7-Up.  Rest may also help.  Be sure to drink enough fluids.  Move to a regular diet as you feel able.  7. You may have a slight fever. Call the doctor if your fever is over 100 F (37.7 C) (taken under the tongue) or lasts longer than 24 hours.  8. You may have a dry mouth, a sore throat, muscle aches or trouble sleeping.  These should go away after 24 hours.  9. Do not make important or legal decisions.   Call your doctor for any of the followin.  Signs of infection (fever, growing tenderness at the surgery site, a large amount of drainage or bleeding, severe pain, foul-smelling drainage,  redness, swelling).    2. It has been over 8 to 10 hours since surgery and you are still not able to urinate (pass water).    3.  Headache for over 24 hours.    4.  Numbness, tingling or weakness the day after surgery (if you had spinal anesthesia).  To contact a doctor, call ________________________________________ POSTOPERATIVE INSTRUCTIONS FOOT AND ANKLE SURGERY   Alvaro Gautam M.D.    Crutches/walker: You must use crutches/walker when up until instructed otherwise.    Bloody drainage on the dressing cast: This is normal. Cast and dressing material act as a sponge.    Bruising of toes: It is normal to experience some bruising in the toes after surgery.    Driving: For surgery on your right foot/ankle, you may not drive unless otherwise instructed. For surgery on the left foot/ankle, driving is not advised for the first week.    Elevate foot/ankle: Keep your operated foot/ankle elevated at or above your heart 95% of the day the first 24-48 hours following surgery (only use the bathroom). Excessive swelling of the operative foot/ankle causes increased pain, problems with the incision healing and problems with the surgical repair healing.  o The foot should be elevated when you sit to eat.  o Avoid sitting with your foot hanging down.  o You may lie on your side for periods during the day/night; however you should still keep       your foot elevated.  It is not advisable to lie on your stomach.    Ice:  Use ice on foot/ankle over dressing/cast for 2-3 days or more.    Lightheadedness when you get up:  To prevent lightheadedness, sit up and let your foot hang down for 3-5 minutes BEFORE you get up.    Can you move your toes? Only if you did not have surgery on your toes.  It is important to actively wiggle you toes at least 5-10 minutes each hour. This will help prevent blood clots.    Dressing/Cast: Keep clean and dry.    Bathing:  Take a sponge bath/tub instead of a shower.  If you choose to shower, cover the  cast/dressing with a waterproof covering (heavy duty plastic bag, or purchased shower cover) and sit on a chair/stool. Have someone available to help you if needed.    Cam Walker:  If issued, wear the CAM walker (boot) 24 hours/day until your follow up appointment unless instructed otherwise.    Pain Medications:    o Take with food to avoid nausea related to the medication.  o Take the medication regularly as prescribed to avoid pain becoming difficult to manage.  Generally your pain should improve by the third day. It is not unusual for the pain to be worse a day or two after surgery than it is on the day of surgery.  o Do not take the pain medication more frequently than prescribed.  If the medication does not relieve the pain or does not last the prescribed time, contact your physician.  o As your pain lessens you may diminish your pain medicine intake by taking one pain pill with one plain Tylenol.  (Unless you have a medical condition restricting the use of Tylenol)  o Tylenol is an ingredient in some pain medications.  To avoid liver damage, it must be limited to 8 tablets (500 mg or Extra Strength) or 12 tablets (325 mg or regular strength) per day.  o Do not supplement your pain medication with any type of anti-inflammatory medication (Ibuprofen, Motrin, Aleve, Celebrex, etc.) without first contacting your physician.  o If you need a refill, please call to allow 48-72 hours before you run out of medication, to avoid running out during evening, weekend or holiday hours.    Fever: It is not unusual to run a low grade fever after surgery.  If your temperature is elevated above 100 degrees, lasts for longer than 24 hours or is questionable in any way, contact the physician.    Constipation: all pain medications along with inactivity can cause constipation.  o Increase your fluid intake to AT LEAST 1 quart of water per day.  o Increase your dietary fiber (Bran cereals, whole grains, fruits, and vegetables.) Eat  "the  P  fruits: peaches, plums, pears, prunes. Anise/black licorice is also known to act as a natural laxative.  o Avoid the BRAT diet, (bananas, rice, applesauce, and toast) as it may increase constipation.  o If no bowel movement occurs 3 days following surgery, a mild over the counter laxative is recommended.      Be sure you have your post-operative follow up appointments made.  Call    [facility] appointment and triage number  Safety Tips for Using Crutches    Crutch Fit:    Assume good standing posture with shoulders relaxed and crutch tips 6-8 inches out from the side of the foot.    The underarm pad should fall 2-3 fingers width below the armpit.    The handgrip is positioned level with the wrist to allow 30  flexion at the elbow.    Safety Tips:    Bear weight on your hands, not on your armpits.    Do not add extra padding to the underarm pad. This will, in effect, lengthen the crutches and increase risk of nerve injury.    Wear flat, properly fitting shoes. Do not walk in stocking feet, high heels or slippers.    Household hazards:  --Throw rugs should be removed from floors.  --Stairs should be cleared of obstacles.  --Use extra caution on slippery, highly polished, littered or uneven floor surfaces.  --Check for electric cords.    Check crutch tips for excessive wear and keep wing nuts tight.    While walking, look forward with  head up  and  eyes open.  Take equal length steps.    Use BOTH crutches.    Stairs Sequence:    UP: \"Good\" leg first, followed by  bad  leg, then crutches.    DOWN: Crutches, followed by  bad  leg, \"good\" leg.     Walking with Crutches:    Move both crutches forward at the same time.    Non-Weight Bearing (NWB):  Hold the involved leg up and swing through the crutches with the involved leg. The involved leg does not touch the floor.    Toe Touch Weight Bearing (TTWB): Move the involved leg forward. Rest it lightly on the floor for balance only. Step through " "the crutches with the uninvolved leg.    Partial Weight Bearing (PWB): Move the involved leg forward. Step down the weight of the leg only.  Step through the crutches with the uninvolved leg.    Weight Bearing As Tolerated (WBAT): Move the involved leg forward. Put as much pressure through the involved leg as you can tolerate comfortably. Then step through the crutches with the uninvolved leg.    Rev. 4/2014          Pending Results     No orders found from 9/24/2018 to 9/27/2018.            Admission Information     Date & Time Provider Department Dept. Phone    9/26/2018 Alvaro Gautam MD [facility] PACU [phone]      Your Vitals Were     Blood Pressure Temperature Respirations Height Weight Pulse Oximetry    153/67 98.1  F (36.7  C) (Oral) 13 1.753 m (5' 9\") 83.1 kg (183 lb 3.2 oz) 99%    BMI (Body Mass Index)                   27.05 kg/m2           [facility] Information     [facility] gives you secure access to your electronic health record. If you see a primary care provider, you can also send messages to your care team and make appointments. If you have questions, please call your primary care clinic.  If you do not have a primary care provider, please call [phone] and they will assist you.        Care EveryWhere ID     This is your Care EveryWhere ID. This could be used by other organizations to access your [facility] medical records  [id number]        Equal Access to Services     KALYANI WARREN : Hadii danisha jorgeo Soaleksandar, waaxda luqadaha, qaybta kaalmada adetereyada, madeline sood . So [facility] [phone].    ATENCIÓN: Si habla español, tiene a santoro disposición servicios gratuitos de asistencia lingüística. Llame al [phone].    We comply with applicable federal civil rights laws and [place] laws. We do not discriminate on the basis of race, color, national origin, age, disability, sex, sexual orientation, or gender identity.               Review of your medicines    "   UNREVIEWED medicines. Ask your doctor about these medicines        Dose / Directions    FLORAJEN3 PO        Dose:  1 tablet   Take 1 tablet by mouth every morning   Refills:  0         START taking        Dose / Directions    HYDROcodone-acetaminophen 5-325 MG per tablet   Commonly known as:  NORCO   Used for:  Right foot pain        Pain 1-5 take 1 tab, 6-10 take 2 tabs every 4 hours as needed   Quantity:  20 tablet   Refills:  0       hydrOXYzine 10 MG tablet   Commonly known as:  ATARAX   Used for:  Right foot pain        Dose:  10 mg   Take 1 tablet (10 mg) by mouth every 4 hours as needed for itching (pain, anxiety)   Quantity:  30 tablet   Refills:  0         CONTINUE these medicines which may have CHANGED, or have new prescriptions. If we are uncertain of the size of tablets/capsules you have at home, strength may be listed as something that might have changed.        Dose / Directions    acetaminophen 325 MG tablet   Commonly known as:  TYLENOL   This may have changed:  when to take this   Used for:  Diabetic ulcer of toe of right foot associated with type 2 diabetes mellitus, with other ulcer severity (H)        Dose:  650 mg   Take 2 tablets (650 mg) by mouth every 4 hours as needed for mild pain   Quantity:  100 tablet   Refills:  0       albuterol 108 (90 Base) MCG/ACT inhaler   Commonly known as:  PROAIR HFA/PROVENTIL HFA/VENTOLIN HFA   This may have changed:  when to take this   Used for:  Cough        Dose:  2 puff   Inhale 2 puffs into the lungs every 6 hours as needed for shortness of breath / dyspnea or wheezing   Quantity:  3 Inhaler   Refills:  1       BASAGLAR 100 UNIT/ML injection   This may have changed:    - how much to take  - how to take this  - when to take this  - additional instructions   Used for:  Type 2 diabetes mellitus with diabetic neuropathy, with long-term current use of insulin (H)        INJECT 28 UNITS SUBCUTANEOUS AT BEDTIME   Quantity:  3 mL   Refills:  11       famotidine  20 MG tablet   Commonly known as:  PEPCID   This may have changed:  how much to take   Used for:  Right foot pain        Dose:  20 mg   Take 1 tablet (20 mg) by mouth daily   Quantity:  60 tablet   Refills:  0       sertraline 25 MG tablet   Commonly known as:  ZOLOFT   This may have changed:  See the new instructions.   Used for:  NICOLE (generalized anxiety disorder)        TAKE 1 TABLET (25 MG) BY MOUTH DAILY   Quantity:  30 tablet   Refills:  3         CONTINUE these medicines which have NOT CHANGED        Dose / Directions    ATORVASTATIN CALCIUM PO        Dose:  20 mg   Take 20 mg by mouth every morning   Refills:  0       carvedilol 12.5 MG tablet   Commonly known as:  COREG   Used for:  Essential hypertension with goal blood pressure less than 140/90        Dose:  12.5 mg   Take 1 tablet (12.5 mg) by mouth 2 times daily (with meals)   Quantity:  60 tablet   Refills:  11       doxycycline 100 MG capsule   Commonly known as:  VIBRAMYCIN   Used for:  Hardware complicating wound infection, subsequent encounter        TAKE 1 CAPSULE (100 MG) BY MOUTH 2 TIMES DAILY   Quantity:  60 capsule   Refills:  2       furosemide 40 MG tablet   Commonly known as:  LASIX   Used for:  Lymphedema of both lower extremities        Dose:  40 mg   Take 40 mg by mouth every morning TWICE A DAY IF NEEDED   Quantity:  30 tablet   Refills:  11       insulin pen needle 31G X 6 MM   Commonly known as:  ULTICARE MINI   Used for:  Type 2 diabetes, HbA1c goal < 7% (H)        Use daily or as directed.   Quantity:  100 each   Refills:  1       NovoLOG FLEXPEN 100 UNIT/ML injection   Used for:  Type 2 diabetes mellitus with hyperglycemia, with long-term current use of insulin (H)   Generic drug:  insulin aspart        Dose:  4 Units   4 Units Inject 4 units SQ with breakfast, lunch and dinner. IF SNACK BETWEEN LUNCH AND DINNER SHE TAKES AN EXTRA 2 UNITS   Quantity:  15 mL   Refills:  3       ondansetron 4 MG tablet   Commonly known as:  ZOFRAN    Used for:  Nausea        Dose:  4 mg   Take 1 tablet (4 mg) by mouth every 12 hours as needed for nausea   Quantity:  18 tablet   Refills:  1       ONETOUCH ULTRA test strip   Used for:  Type 2 diabetes mellitus with hyperglycemia, with long-term current use of insulin (H)   Generic drug:  blood glucose monitoring        TEST YOUR BLOOD SUGAR 3-4 TIMES PER DAY.   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 Read the directions carefully, and ask your doctor or  other care provider to review them with you.      STOP taking     ACE/ARB/ARNI NOT PRESCRIBED (INTENTIONAL)           clindamycin 1 % lotion   Commonly known as:  CLEOCIN T           fluocinonide 0.05 % ointment   Commonly known as:  LIDEX           triamcinolone 0.1 % cream   Commonly known as:  KENALOG           triamcinolone 0.1 % ointment   Commonly known as:  KENALOG           Urea 20 % Crea cream           vitamin D 2000 units tablet                Where to get your medicines      Some of these will need a paper prescription and others can be bought over the counter. Ask your nurse if you have questions.     Bring a paper prescription for each of these medications     HYDROcodone-acetaminophen 5-325 MG per tablet       You don't need a prescription for these medications     famotidine 20 MG tablet    hydrOXYzine 10 MG tablet                Protect others around you: Learn how to safely use, store and throw away your medicines at www.disposemymeds.org.        Information about OPIOIDS     PRESCRIPTION OPIOIDS: WHAT YOU NEED TO KNOW   We gave you an opioid (narcotic) pain medicine. It is important to manage your pain, but opioids are not always the best choice. You should first try all the other options your care team gave you. Take this medicine for as short a time (and as few doses) as possible.    Some activities can increase your pain, such as bandage changes or therapy sessions. It may help to take your pain medicine 30 to 60 minutes before these activities. Reduce your stress by getting enough sleep, working on hobbies you enjoy and practicing relaxation or meditation. Talk to your care team about ways to manage your pain beyond prescription opioids.    These medicines have risks:    DO NOT drive when on new or higher doses of pain medicine. These medicines can affect your alertness and reaction times, and you could be arrested for driving under the influence (DUI). If you need to use opioids long-term,  talk to your care team about driving.    DO NOT operate heavy machinery    DO NOT do any other dangerous activities while taking these medicines.    DO NOT drink any alcohol while taking these medicines.     If the opioid prescribed includes acetaminophen, DO NOT take with any other medicines that contain acetaminophen. Read all labels carefully. Look for the word  acetaminophen  or  Tylenol.  Ask your pharmacist if you have questions or are unsure.    You can get addicted to pain medicines, especially if you have a history of addiction (chemical, alcohol or substance dependence). Talk to your care team about ways to reduce this risk.    All opioids tend to cause constipation. Drink plenty of water and eat foods that have a lot of fiber, such as fruits, vegetables, prune juice, apple juice and high-fiber cereal. Take a laxative (Miralax, milk of magnesia, Colace, Senna) if you don t move your bowels at least every other day. Other side effects include upset stomach, sleepiness, dizziness, throwing up, tolerance (needing more of the medicine to have the same effect), physical dependence and slowed breathing.    Store your pills in a secure place, locked if possible. We will not replace any lost or stolen medicine. If you don t finish your medicine, please throw away (dispose) as directed by your pharmacist. The [place] [employer] has more information about safe disposal: https://www.pca.[facility].[place].us/living-green/managing-unwanted-medications             Medication List: This is a list of all your medications and when to take them. Check marks below indicate your daily home schedule. Keep this list as a reference.      Medications           Morning Afternoon Evening Bedtime As Needed    acetaminophen 325 MG tablet   Commonly known as:  TYLENOL   Take 2 tablets (650 mg) by mouth every 4 hours as needed for mild pain   Last time this was given:  975 mg on 9/26/2018  7:14 AM                                 albuterol 108 (90 Base) MCG/ACT inhaler   Commonly known as:  PROAIR HFA/PROVENTIL HFA/VENTOLIN HFA   Inhale 2 puffs into the lungs every 6 hours as needed for shortness of breath / dyspnea or wheezing                                ATORVASTATIN CALCIUM PO   Take 20 mg by mouth every morning                                BASAGLAR 100 UNIT/ML injection   INJECT 28 UNITS SUBCUTANEOUS AT BEDTIME                                carvedilol 12.5 MG tablet   Commonly known as:  COREG   Take 1 tablet (12.5 mg) by mouth 2 times daily (with meals)                                doxycycline 100 MG capsule   Commonly known as:  VIBRAMYCIN   TAKE 1 CAPSULE (100 MG) BY MOUTH 2 TIMES DAILY                                famotidine 20 MG tablet   Commonly known as:  PEPCID   Take 1 tablet (20 mg) by mouth daily                                FLORAJEN3 PO   Take 1 tablet by mouth every morning                                furosemide 40 MG tablet   Commonly known as:  LASIX   Take 40 mg by mouth every morning TWICE A DAY IF NEEDED                                HYDROcodone-acetaminophen 5-325 MG per tablet   Commonly known as:  NORCO   Pain 1-5 take 1 tab, 6-10 take 2 tabs every 4 hours as needed                                hydrOXYzine 10 MG tablet   Commonly known as:  ATARAX   Take 1 tablet (10 mg) by mouth every 4 hours as needed for itching (pain, anxiety)                                insulin pen needle 31G X 6 MM   Commonly known as:  ULTICARE MINI   Use daily or as directed.                                NovoLOG FLEXPEN 100 UNIT/ML injection   4 Units Inject 4 units SQ with breakfast, lunch and dinner. IF SNACK BETWEEN LUNCH AND DINNER SHE TAKES AN EXTRA 2 UNITS   Generic drug:  insulin aspart                                ondansetron 4 MG tablet   Commonly known as:  ZOFRAN   Take 1 tablet (4 mg) by mouth every 12 hours as needed for nausea                                ONETOUCH ULTRA test strip   TEST YOUR BLOOD  SUGAR 3-4 TIMES PER DAY.   Generic drug:  blood glucose monitoring                                order for DME   Equipment being ordered: Compression stockings, 20-30 MMHG, knee high                                * order for DME   Equipment being ordered: TEDS stocking  Below the knee 15-20 mg Dispense 2 Use daily                                * order for DME   1 wheelchair                                * order for DME   Equipment being ordered: Right Lower extremity Solaris Ready wrap calf piece:  Size small/length tall , knee piece: Size small , Thigh piece size small/length average.                                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 Read the directions carefully, and ask your doctor or other care provider to review them with you.

## 2018-09-26 NOTE — PROVIDER NOTIFICATION
Rash/redness on both hands, buttocks, under breasts, under abdominal folds, and in bilateral groins.  Scabs on buttocks from itching.   Small sore on bottom of right foot.

## 2018-09-26 NOTE — OR NURSING
Red rash both hands, both groins. Has red rash at coccyx site with open area in crease. Meriplex dressing applied.

## 2018-09-26 NOTE — IP AVS SNAPSHOT
[facility]    [address] [facility] [address]    Phone:  [phone]                                       After Visit Summary   9/26/2018    Supriya Herr    MRN: [id number]           After Visit Summary Signature Page     I have received my discharge instructions, and my questions have been answered. I have discussed any challenges I see with this plan with the nurse or doctor.    ..........................................................................................................................................  Patient/Patient Representative Signature      ..........................................................................................................................................  Patient Representative Print Name and Relationship to Patient    ..................................................               ................................................  Date                                   Time    ..........................................................................................................................................  Reviewed by Signature/Title    ...................................................              ..............................................  Date                                               Time          22EPIC Rev 08/18

## 2018-09-27 NOTE — TELEPHONE ENCOUNTER
I returned a call to the number listed for Viky at [facility].  This is the wrong phone number.  I have tried 3 times using google to call [facility].  I have been unable to establish if Supriya is there.  Hopefully they will call back this morning.

## 2018-09-28 ENCOUNTER — MEDICAL CORRESPONDENCE (OUTPATIENT)
Dept: HEALTH INFORMATION MANAGEMENT | Facility: CLINIC | Age: 68
End: 2018-09-28

## 2018-09-28 ENCOUNTER — TELEPHONE (OUTPATIENT)
Dept: FAMILY MEDICINE | Facility: CLINIC | Age: 68
End: 2018-09-28

## 2018-09-28 DIAGNOSIS — L30.9 DERMATITIS: ICD-10-CM

## 2018-09-28 DIAGNOSIS — L30.4 INTERTRIGO: ICD-10-CM

## 2018-09-28 DIAGNOSIS — E55.9 VITAMIN D DEFICIENCY: Primary | ICD-10-CM

## 2018-09-28 RX ORDER — CLINDAMYCIN PHOSPHATE 10 UG/ML
LOTION TOPICAL 2 TIMES DAILY
Qty: 60 ML | Refills: 1 | Status: SHIPPED | OUTPATIENT
Start: 2018-09-28 | End: 2018-10-01

## 2018-09-28 RX ORDER — CHOLECALCIFEROL (VITAMIN D3) 50 MCG
2000 TABLET ORAL DAILY
Qty: 100 TABLET | Refills: 3 | Status: SHIPPED | OUTPATIENT
Start: 2018-09-28 | End: 2018-10-01

## 2018-09-28 RX ORDER — TRIAMCINOLONE ACETONIDE 0.25 MG/G
OINTMENT TOPICAL
Qty: 80 G | Refills: 1 | Status: SHIPPED | OUTPATIENT
Start: 2018-09-28 | End: 2018-10-01

## 2018-09-29 ENCOUNTER — TELEPHONE (OUTPATIENT)
Dept: FAMILY MEDICINE | Facility: CLINIC | Age: 68
End: 2018-09-29

## 2018-09-29 ENCOUNTER — MYC MEDICAL ADVICE (OUTPATIENT)
Dept: FAMILY MEDICINE | Facility: CLINIC | Age: 68
End: 2018-09-29

## 2018-09-29 DIAGNOSIS — I89.0 LYMPHEDEMA OF BOTH LOWER EXTREMITIES: ICD-10-CM

## 2018-09-29 DIAGNOSIS — L30.9 DERMATITIS: ICD-10-CM

## 2018-09-29 DIAGNOSIS — Z79.4 TYPE 2 DIABETES MELLITUS WITH DIABETIC NEUROPATHY, WITH LONG-TERM CURRENT USE OF INSULIN (H): ICD-10-CM

## 2018-09-29 DIAGNOSIS — E11.40 TYPE 2 DIABETES MELLITUS WITH DIABETIC NEUROPATHY, WITH LONG-TERM CURRENT USE OF INSULIN (H): ICD-10-CM

## 2018-09-29 DIAGNOSIS — L30.4 INTERTRIGO: ICD-10-CM

## 2018-09-29 DIAGNOSIS — E55.9 VITAMIN D DEFICIENCY: ICD-10-CM

## 2018-09-29 NOTE — TELEPHONE ENCOUNTER
"Nurse from [facility] calling on FNA Doctor Line requesting to have the on call provider for Dr. Jackson paged. Reports \"the answering service paged the wrong doctor to me, she was an OB doctor (Dr. Pérez) and she gave me this number to re page the right doctor.\"    FNA nurse transferred caller to [facility] [place] answering service at [phone] for assistance.    Helena Schaefer RN  [facility]    "

## 2018-10-01 ENCOUNTER — MYC MEDICAL ADVICE (OUTPATIENT)
Dept: FAMILY MEDICINE | Facility: CLINIC | Age: 68
End: 2018-10-01

## 2018-10-01 ENCOUNTER — TELEPHONE (OUTPATIENT)
Dept: FAMILY MEDICINE | Facility: CLINIC | Age: 68
End: 2018-10-01

## 2018-10-01 DIAGNOSIS — E11.40 TYPE 2 DIABETES MELLITUS WITH DIABETIC NEUROPATHY, WITH LONG-TERM CURRENT USE OF INSULIN (H): ICD-10-CM

## 2018-10-01 DIAGNOSIS — I89.0 LYMPHEDEMA OF BOTH LOWER EXTREMITIES: ICD-10-CM

## 2018-10-01 DIAGNOSIS — Z79.4 TYPE 2 DIABETES MELLITUS WITH DIABETIC NEUROPATHY, WITH LONG-TERM CURRENT USE OF INSULIN (H): ICD-10-CM

## 2018-10-01 RX ORDER — TRIAMCINOLONE ACETONIDE 0.25 MG/G
OINTMENT TOPICAL
Qty: 80 G | Refills: 1 | Status: SHIPPED | OUTPATIENT
Start: 2018-10-01 | End: 2018-10-08

## 2018-10-01 RX ORDER — CLINDAMYCIN PHOSPHATE 10 UG/ML
LOTION TOPICAL 2 TIMES DAILY
Qty: 60 ML | Refills: 1 | Status: SHIPPED | OUTPATIENT
Start: 2018-10-01 | End: 2018-10-24

## 2018-10-01 RX ORDER — TRIAMCINOLONE ACETONIDE 1 MG/G
CREAM TOPICAL
Qty: 30 G | Refills: 0 | Status: SHIPPED | OUTPATIENT
Start: 2018-10-01 | End: 2019-02-15

## 2018-10-01 RX ORDER — CHOLECALCIFEROL (VITAMIN D3) 50 MCG
2000 TABLET ORAL DAILY
Qty: 100 TABLET | Refills: 3 | Status: SHIPPED | OUTPATIENT
Start: 2018-10-01 | End: 2019-02-15

## 2018-10-01 RX ORDER — INSULIN GLARGINE 100 [IU]/ML
INJECTION, SOLUTION SUBCUTANEOUS
Qty: 3 ML | Refills: 11 | Status: SHIPPED | OUTPATIENT
Start: 2018-10-01 | End: 2018-10-09

## 2018-10-01 RX ORDER — FUROSEMIDE 40 MG
40 TABLET ORAL DAILY
Qty: 30 TABLET | Refills: 11 | Status: SHIPPED | OUTPATIENT
Start: 2018-10-01 | End: 2018-10-03 | Stop reason: SINTOL

## 2018-10-01 RX ORDER — INSULIN GLARGINE 100 [IU]/ML
INJECTION, SOLUTION SUBCUTANEOUS
Qty: 3 ML | Refills: 11 | Status: SHIPPED | OUTPATIENT
Start: 2018-10-01 | End: 2018-10-01

## 2018-10-01 RX ORDER — FUROSEMIDE 40 MG
40 TABLET ORAL 2 TIMES DAILY PRN
Qty: 30 TABLET | Refills: 11 | Status: SHIPPED | OUTPATIENT
Start: 2018-10-01 | End: 2018-10-01

## 2018-10-01 NOTE — TELEPHONE ENCOUNTER
Dr. Jackson,    Please see daughter's mychart message. Cued rx for 22 units of basaglar.Please review/sign or advise.    Cheryl Law RN  [facility]

## 2018-10-01 NOTE — TELEPHONE ENCOUNTER
Called SHANTA Lundberg at [facility], notified of new  Basaglar orders. Tamika verified understanding. She also had questions regarding Lasix dose. Currently the medication order is Take 40 mg by mouth every morning TWICE A DAY IF NEEDED - Oral. Patient and daughter would like to know if this should be PRN or scheduled? Patients daughter also stated triamcinolone (KENALOG) 0.025 % ointment is the wrong strength. New rx cued.    Please review/sign or advise    Cheryl Law RN  [facility]

## 2018-10-01 NOTE — TELEPHONE ENCOUNTER
Notified [facility] of updated orders. Faxed copy of updated order to Tamika at [phone]. [employer] message sent to patient's daughter with updated plan and information.    Cheryl Law RN  [facility]

## 2018-10-01 NOTE — TELEPHONE ENCOUNTER
Reason for call:  Other   Patient called regarding (reason for call): call back  Additional comments: Orly christina Chappell called and needed clarification on an order for the patient. She would like a call back whenever someone is available.    Phone number to reach patient:  Other phone number:  [phone]    Best Time: Any    Can we leave a detailed message on this number?  NO

## 2018-10-01 NOTE — TELEPHONE ENCOUNTER
"Dr. Jackson-     Returned call from Orly. Patient's daughter is requesting the  Lasix dose be scheduled, not PRN, as this is how Supriya takes it at home.      Called Caroline Gray, patient's daughter to get clarification regarding medications. See below:    Basaglar - per patient's daughter, the 25 unit dose was increased to 28 units because patient was taking steroids, the dose was to stay at 28 units until steroid course was done. Upon admission into [facility] the dose stayed at 28 units. Over the weekend it was bottoming out the patients AM blood sugars and adjustments were made. Currently, Supriya is receiving 22 units of basaglar at bedtime and her blood sugars have been in acceptable range. Caroline Gray thinks because she is eating dinner at a different time and her diet isn't as it usually is at home, she is requiring less insulin at night. Per pt's daughter would like pt to stay at 22 units of basaglar at bedtime. Order cued    Lasix- per patient's daughter, pt was taking 40 mg in the morning and 20 mg in the afternoon for her edema. The 20 mg dose in the afternoon was dropped as she was getting hypotensive. Currently at [facility] the patient is getting this medication on a PRN basis? Caroline Gray would like the patient to get lasix 40 mg in the morning scheduled. Order cued    Doxycycline- currently, the patient is getting this BID. Caroline Gray is wondering if this can be discontinued, as her understanding is that this would be a \"bridge\" antibiotic before surgery. She is concerned with the affect it is having on Caroline Gray's kidneys.     Pepcid- Caroline Gray would like this to be PRN, as the only reason it was order is for stomach upset related to the doxycycline.     Zofran- Caroline Gray would like this to be PRN, as the only reason it was order is for stomach upset related to the doxycycline.     However, if doxycycline is discontinued, Caroline Gray would like both pepcid and zofran to be discontinued.     Please review/sign or " advise.    Cheryl Law RN-- I'm so sorry for all the messages and confusion  [facility]

## 2018-10-01 NOTE — TELEPHONE ENCOUNTER
Called [facility] RN, relayed new medication orders for Lasix and Basaglar. RN verbalized understanding. Copies of orders faxed to [phone]    [employer] message sent to patient's daughter with update.     Cheryl Law RN  [facility]

## 2018-10-01 NOTE — TELEPHONE ENCOUNTER
Dr. Jackson-    [facility].     Initial dose change on 18, per OV note on 18, patient was instructed to do the followin.Take Lantus 27 units at bedtime and Novolog 7 units with meals and 2 units with afternoon snack while taking prednisone ( steroids ).  2. Take Lantus 25 units at bedtime and Novolog 4 units with meals and 2 units with afternoon snack on 2018 when you are no longer taking prednisone.  Arabella Kamara PA-C    Current prescription 28 units at bedtime    Per chart review, pt's standard dose of 25 units was never resumed, and patient has been getting 28 units at bedtime. After talking to a nurse, Tamika ([phone]), with [facility], patient's AM blood sugars 40-50's over the weekend. Tamika called on call doctors and received order to decrease Basaglar at bedime as follows:     Saturday Night (18)- 24 units     (18) blood sugar - 56     Night (18) - 22 units and bedtime snack (not ususal for patient)    (10/01/18) blood sugar - 101      Med cued. Please review/sign or advice    Cheryl Law RN  [facility]

## 2018-10-01 NOTE — TELEPHONE ENCOUNTER
Reason for Call:  Other call back    Detailed comments: Patient home resident's would like a call back to talk about a few medication questions. They would like to know why she needs to take furosemide (LASIX) 40 MG tablet, And also to talk about changing the dosage of insulin medication.     Phone Number Patient can be reached at: Other phone number:  [phone]*    Best Time: Anytime    Can we leave a detailed message on this number? YES    Call taken on 10/1/2018 at 11:29 AM by Caroline Reeves

## 2018-10-02 ENCOUNTER — MEDICAL CORRESPONDENCE (OUTPATIENT)
Dept: HEALTH INFORMATION MANAGEMENT | Facility: CLINIC | Age: 68
End: 2018-10-02

## 2018-10-02 ENCOUNTER — CARE COORDINATION (OUTPATIENT)
Dept: TRANSPLANT | Facility: CLINIC | Age: 68
End: 2018-10-02

## 2018-10-02 ENCOUNTER — MYC MEDICAL ADVICE (OUTPATIENT)
Dept: FAMILY MEDICINE | Facility: CLINIC | Age: 68
End: 2018-10-02

## 2018-10-02 NOTE — OP NOTE
Procedure Date: 09/26/2018      PREOPERATIVE DIAGNOSES:   1.  Chronic nonhealing ulcers, right foot.   2.  Painful retained hardware, right foot.      POSTOPERATIVE DIAGNOSES:   1.  Chronic nonhealing ulcers, right foot.   2.  Painful retained hardware, right foot.      PROCEDURES:   1.  Right foot medial sesamoid excision.   2.  Right foot proximal phalanx hardware removal.   3.  Right second metatarsal head excision.      SURGEON:  Alvaro Gautam MD      ASSISTANT:  ILA Griffiths Ms.'s assistance was required in order to help with positioning, the surgery itself, holding retractors, closing and application of immobilization devices.  At the time of the surgery, there was no available help from an orthopedic trainee.      COMPLICATIONS:  None.       DRAINS:  None.      ESTIMATED BLOOD LOSS:  Less than 20 mL.      ANESTHESIA:  General endotracheal.      INDICATIONS:  Please refer to clinic notes for further details regarding indications in Mrs. Herr's case.      DESCRIPTION OF PROCEDURE:  On 09/26/2018 patient was taken to surgery.  After successful induction of general endotracheal anesthesia, she was placed supine on the operating table.  The right lower extremity was prepped and draped in sterile fashion, and after exsanguination by gravity, tourniquet cuff was inflated to 300 mmHg on the proximal third of the right thigh.      The pause for the cause was performed according to our institution's policy which confirmed laterality of the procedure.      An incision was made along the medial aspect of the first MTP joint.  Through this incision we proceeded with identification of the medial sesamoid, which was resected without any major difficulties.  This confirmed the absence of any bony prominence along the plantar aspect of the first MTP joint.      Another incision was made along the medial aspect of the proximal phalanx, and we confirmed the extraction of one screw in 1 piece  without any difficulties.      Finally, another incision was made along the dorsal aspect of the second metatarsal, and after further dissection we could proceed with osteotomy in an oblique fashion from dorsal and distal to proximal and plantar, which allowed us to proceed with extraction of the metatarsal head without any major complications.      Finally, tourniquet cuff was deflated.  Satisfactory hemostasis was accomplished.  Wound was closed in layers.  Sterile dressings were applied.  The patient was placed in a short CAM Walker and transferred in stable condition to PACU.      PLAN:  The patient will remain nonweightbearing x2 weeks until satisfactory healing of the wound is accomplished.  At the 2-week appointment, sutures will be removed if indicated.  If in fact she presents with complete healing of the skin, she will be allowed to proceed with weightbearing as tolerated with protective shoe wear in the form of a custom orthotic as well as a protective shoe.  If in fact she does not present with a well-fitting custom orthotics and the swelling is improved enough, she will be molded for a custom made orthotic.      The patient will be extremely careful with the ambulation that she performs without any protection.      The patient will be reevaluated at 6 weeks from surgery.  At that time, no x-rays will be obtained.         TONEY CAMACHO MD             D: 10/02/2018   T: 10/02/2018   MT: SUSHILA      Name:     BROOKE ARANA   MRN:      [id number]        Account:        [id number]   :      1950           Procedure Date: 2018      Document: [id number]

## 2018-10-02 NOTE — TELEPHONE ENCOUNTER
Called care facility. Spoke with nurse and updated her that doxycycline has been discontinued. Also received update on patient's blood sugars last night. This AM sugar was 88 after receiving 22 units of lantus and a snack at bedtime last night. Will update patient's daughter via [employer] message.     Cheryl Law RN  [facility]

## 2018-10-03 ENCOUNTER — TELEPHONE (OUTPATIENT)
Dept: FAMILY MEDICINE | Facility: CLINIC | Age: 68
End: 2018-10-03

## 2018-10-03 DIAGNOSIS — I10 HYPERTENSION GOAL BP (BLOOD PRESSURE) < 140/90: Primary | ICD-10-CM

## 2018-10-03 DIAGNOSIS — L89.309 PRESSURE INJURY OF SKIN OF BUTTOCK, UNSPECIFIED INJURY STAGE, UNSPECIFIED LATERALITY: ICD-10-CM

## 2018-10-03 RX ORDER — FUROSEMIDE 20 MG
20 TABLET ORAL 2 TIMES DAILY
Qty: 60 TABLET | Refills: 0 | Status: SHIPPED | OUTPATIENT
Start: 2018-10-03 | End: 2018-10-23

## 2018-10-03 NOTE — TELEPHONE ENCOUNTER
Reason for call:  Other   Patient called regarding (reason for call): call back  Additional comments: Orly from [facility] called regarding the patient's elevated blood pressures since being arriving. She stated that she arrived with a baseline of 151/70, and on 9/30 it was 173/78, 10/1 it was 177/63, 10/2 it was 193/70, and this morning it was 202/63. She is currently on medication, but they are wondering if the dosage needs to be changed.     Phone number to reach patient:  Other phone number:  [phone] [facility]     Best Time:  Any    Can we leave a detailed message on this number?  Not Applicable

## 2018-10-03 NOTE — TELEPHONE ENCOUNTER
Dr. Jackson/Provider Pool:    Per telephone message pt has been having increased blood pressures, SHANTA Villalba,  stated that she arrived with a baseline of 151/70, and on 9/30 it was 173/78, 10/1 it was 177/63, 10/2 it was 193/70, and this morning it was 202/63.    Currently, patient is taking the following medications for blood pressure.     carvedilol (COREG) 12.5 MG tablet- taking BID at am and pm med pass     furosemide (LASIX) 40 MG tablet - Daily in am    Please advise. Daughter, Caroline Gray, reported that at home patient was taking lasix 40 mg in AM and 20 mg in afternoon, but this was discontinued as it was making the patient too hypotensive.     Called Orly. Asked if patient was having any symptoms associated with hypertension such as chest pain, shortness of breath, nose bleeds, numbness and tingling or confusion and dizziness. Orly denied patient experiencing these symptoms.     Advised [facility] RN, that if patient begins to have symptoms of chest pain, shortness of breath, nose bleeds, numbness and tingling, confusion or dizziness to seek emergency medical care.     Cheryl Law RN  [facility]

## 2018-10-04 ENCOUNTER — TELEPHONE (OUTPATIENT)
Dept: FAMILY MEDICINE | Facility: CLINIC | Age: 68
End: 2018-10-04

## 2018-10-04 NOTE — TELEPHONE ENCOUNTER
"Dr. Jackson,     Can you please call patient 's daughter. She called very upset this morning about discrepancies with mother's medications as well as her mother's care at [facility].  Patient 's daughter states that [facility] staff contipatient's daughter back to our clinic. She has called multiple times with SHANTA Luna (see notes from 10/1) and this writer this morning. Med list was corrected with Cheryl and all daughter's concerns were heard on 10/1. Patient's daughter called again today complaining of the \"disconnect\" between surgical team, our office and [facility]. Writer attempted to go over med list and address any new concerns but patient became increasingly argumentative and writer was unable to get to any outcome with patient's daughter.     Patient daughter requesting call back from Dr. Jackson.     Please advise.    Thanks! Jessi Erickson RN    "

## 2018-10-04 NOTE — TELEPHONE ENCOUNTER
Spoke with Miranda lofton [employer]. Completed a med reconciliation.   Per the AVS from 9/26 the patient's surgical team instructed patient to STOP     STOP taking           ACE/ARB/ARNI NOT PRESCRIBED (INTENTIONAL)               clindamycin 1 % lotion   Commonly known as:  CLEOCIN T               fluocinonide 0.05 % ointment   Commonly known as:  LIDEX               triamcinolone 0.1 % cream   Commonly known as:  KENALOG               triamcinolone 0.1 % ointment   Commonly known as:  KENALOG               Urea 20 % Crea cream               vitamin D 2000 units tablet               Writer will contact the surgical team to try to confirm if this is still current. Miranda lofton [employer] has AVS from patient from 9/24 that states patient is to take these meds.     Will update [facility] once answered.     Thanks! Jessi Erickson RN

## 2018-10-04 NOTE — TELEPHONE ENCOUNTER
Reason for call:  Other   Patient called regarding (reason for call): prescription  Additional comments: Miranda from [facility] is requesting a call back from a nurse. She has some questions about a cream that the patient has been prescribed, and she needs to know where/when to apply it. The patient has some discoloration on her knuckles and abdomin but they aren't sure if the cream is supposed to be used on both.     Phone number to reach patient:  Other phone number:  [phone]    Best Time:  Any    Can we leave a detailed message on this number?  Not Applicable

## 2018-10-04 NOTE — TELEPHONE ENCOUNTER
Miranda at [facility] called back with an updated fax number to send the orders to, as the one they gave initially isn't working. The new fax number is [phone]

## 2018-10-04 NOTE — TELEPHONE ENCOUNTER
Dr. Jackson,    RNs at [facility] are wondering what diagnosis is associated with her hydroxyzine. The order looks like it was written by Karlene Saha PA-C.     Would you like to discontinue this?    Please advise.    Called [facility] to give RN updated orders regarding carvedilol and furosemide. New orders faxed to [phone]    Writer asked Ta RN about most recent blood pressures: 10/04/2018 at 0124 - 162/78; 10/03/2018 1654 - 173/70    Writer asked RN about wound on bottom. RN from [facility] will call back after assessment.     Cheryl Law, SHANTA  [facility]

## 2018-10-04 NOTE — TELEPHONE ENCOUNTER
Spoke with Caroline Gray on the phone. See TE on 10/4/18.     Closing encounter. No further action needed.    Cheryl Law RN  [facility]

## 2018-10-04 NOTE — TELEPHONE ENCOUNTER
Dr. Gautam and Team,     Patient AVS from 9/26 states:    STOP taking           ACE/ARB/ARNI NOT PRESCRIBED (INTENTIONAL)               clindamycin 1 % lotion   Commonly known as:  CLEOCIN T               fluocinonide 0.05 % ointment   Commonly known as:  LIDEX               triamcinolone 0.1 % cream   Commonly known as:  KENALOG               triamcinolone 0.1 % ointment   Commonly known as:  KENALOG               Urea 20 % Crea cream               vitamin D 2000 units tablet               [facility] nurse from [facility] does not have these orders and the daughter of patient is currently trying to get these meds ordered for patient.     Please advise.     Thanks! Jessi Erickson RN

## 2018-10-05 ENCOUNTER — PATIENT OUTREACH (OUTPATIENT)
Dept: CARE COORDINATION | Facility: CLINIC | Age: 68
End: 2018-10-05

## 2018-10-05 NOTE — PROGRESS NOTES
"Clinic Care Coordination Contact  Care Team Conversations    Received voicemail from daughter asking for help with a \"cluster of a situation between [place] and the [facility]\". Left msg on machine for daughter to call back to discuss.    Carleen Valdes R.N.  Clinic Care Coordinator  [facility]  [phone]              "

## 2018-10-06 DIAGNOSIS — E78.5 HYPERLIPIDEMIA LDL GOAL <100: ICD-10-CM

## 2018-10-08 ENCOUNTER — NURSING HOME VISIT (OUTPATIENT)
Dept: GERIATRICS | Facility: CLINIC | Age: 68
End: 2018-10-08
Payer: MEDICARE

## 2018-10-08 ENCOUNTER — TELEPHONE (OUTPATIENT)
Dept: FAMILY MEDICINE | Facility: CLINIC | Age: 68
End: 2018-10-08

## 2018-10-08 VITALS
RESPIRATION RATE: 18 BRPM | DIASTOLIC BLOOD PRESSURE: 61 MMHG | TEMPERATURE: 97.8 F | OXYGEN SATURATION: 97 % | WEIGHT: 186 LBS | HEIGHT: 65 IN | BODY MASS INDEX: 30.99 KG/M2 | HEART RATE: 68 BPM | SYSTOLIC BLOOD PRESSURE: 157 MMHG

## 2018-10-08 DIAGNOSIS — F33.1 MODERATE RECURRENT MAJOR DEPRESSION (H): ICD-10-CM

## 2018-10-08 DIAGNOSIS — L97.519 PLANTAR ULCER OF RIGHT FOOT, UNSPECIFIED ULCER STAGE (H): ICD-10-CM

## 2018-10-08 DIAGNOSIS — E11.40 TYPE 2 DIABETES MELLITUS WITH DIABETIC NEUROPATHY, WITH LONG-TERM CURRENT USE OF INSULIN (H): ICD-10-CM

## 2018-10-08 DIAGNOSIS — Z79.4 TYPE 2 DIABETES MELLITUS WITH DIABETIC NEUROPATHY, WITH LONG-TERM CURRENT USE OF INSULIN (H): ICD-10-CM

## 2018-10-08 DIAGNOSIS — Z48.89 AFTERCARE FOLLOWING SURGERY: Primary | ICD-10-CM

## 2018-10-08 DIAGNOSIS — I10 ESSENTIAL HYPERTENSION: ICD-10-CM

## 2018-10-08 DIAGNOSIS — N18.5 CKD (CHRONIC KIDNEY DISEASE) STAGE 5, GFR LESS THAN 15 ML/MIN (H): ICD-10-CM

## 2018-10-08 DIAGNOSIS — D63.1 ANEMIA IN STAGE 5 CHRONIC KIDNEY DISEASE, NOT ON CHRONIC DIALYSIS (H): ICD-10-CM

## 2018-10-08 DIAGNOSIS — E11.22 TYPE 2 DIABETES MELLITUS WITH DIABETIC CHRONIC KIDNEY DISEASE, UNSPECIFIED CKD STAGE, UNSPECIFIED WHETHER LONG TERM INSULIN USE (H): ICD-10-CM

## 2018-10-08 DIAGNOSIS — N18.5 ANEMIA IN STAGE 5 CHRONIC KIDNEY DISEASE, NOT ON CHRONIC DIALYSIS (H): ICD-10-CM

## 2018-10-08 PROCEDURE — 99310 SBSQ NF CARE HIGH MDM 45: CPT | Performed by: NURSE PRACTITIONER

## 2018-10-08 NOTE — TELEPHONE ENCOUNTER
[facility] called and wanted the okay to transfer care to one of the in house MDs at [facility] because of the complexity of care of the patient.   It was stated that the family is okay with the transfer of care.  Please call and give okay.  Gretchen at [phone] and it was stated it is okay to leave a message
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
## 2018-10-08 NOTE — TELEPHONE ENCOUNTER
Dr. Jackson,    Please see phone message below.     Please advise.    Ana Luisa Garcia RN  [facility]
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
## 2018-10-08 NOTE — TELEPHONE ENCOUNTER
Called and spoke with Gretchen, verbal TO given with okay per provider Dr. Jackson for transfer of care to in house MD at [facility] because of pt complexity of care.     Ana Luisa Garcia RN  [facility]

## 2018-10-08 NOTE — TELEPHONE ENCOUNTER
Called Gretchen, [phone], received busy signal. Will retry later.    Ana Luisa Garcia RN  [facility]

## 2018-10-08 NOTE — TELEPHONE ENCOUNTER
"Requested Prescriptions   Pending Prescriptions Disp Refills     atorvastatin (LIPITOR) 20 MG tablet [Pharmacy Med Name: ATORVASTATIN 20 MG TABLET] 90 tablet 3    Last Written Prescription Date:  -  Last Fill Quantity: -,  # refills: -   Last office visit: 8/21/2018 with prescribing provider:  08/21/2018   Future Office Visit:   Next 5 appointments (look out 90 days)     Nov 08, 2018 12:30 PM CST   (Arrive by 12:15 PM)   Office Visit with [facility] ([facility])    [address] [place] [id number] [phone]                  Sig: TAKE 1 TABLET BY MOUTH ONCE DAILY    Statins Protocol Failed    10/6/2018  2:42 AM       Failed - No abnormal creatine kinase in past 12 months    Recent Labs   Lab Test  11/05/17   0903   CKT  18*               Passed - LDL on file in past 12 months    Recent Labs   Lab Test  03/29/18   1410   LDL  108*            Passed - Recent (12 mo) or future (30 days) visit within the authorizing provider's specialty    Patient had office visit in the last 12 months or has a visit in the next 30 days with authorizing provider or within the authorizing provider's specialty.  See \"Patient Info\" tab in inbasket, or \"Choose Columns\" in Meds & Orders section of the refill encounter.           Passed - Patient is age 18 or older       Passed - No active pregnancy on record       Passed - No positive pregnancy test in past 12 months          "

## 2018-10-09 ENCOUNTER — OFFICE VISIT (OUTPATIENT)
Dept: ORTHOPEDICS | Facility: CLINIC | Age: 68
End: 2018-10-09
Payer: MEDICARE

## 2018-10-09 ENCOUNTER — TELEPHONE (OUTPATIENT)
Dept: ONCOLOGY | Facility: CLINIC | Age: 68
End: 2018-10-09

## 2018-10-09 DIAGNOSIS — M25.571 PAIN IN JOINT, ANKLE AND FOOT, RIGHT: Primary | ICD-10-CM

## 2018-10-09 RX ORDER — INSULIN GLARGINE 100 [IU]/ML
20 INJECTION, SOLUTION SUBCUTANEOUS AT BEDTIME
Qty: 3 ML | Refills: 11
Start: 2018-10-09 | End: 2019-02-15

## 2018-10-09 RX ORDER — ATORVASTATIN CALCIUM 20 MG/1
TABLET, FILM COATED ORAL
Qty: 90 TABLET | Refills: 3 | Status: SHIPPED | OUTPATIENT
Start: 2018-10-09 | End: 2020-03-13

## 2018-10-09 NOTE — MR AVS SNAPSHOT
After Visit Summary   10/9/2018    Supriya Herr    MRN: [id number]           Patient Information     Date Of Birth          1950        Visit Information        Provider Department      10/9/2018 1:00 PM Alvaro Gautam MD [facility]        Today's Diagnoses     Pain in joint, ankle and foot, right    -  1       Follow-ups after your visit        Follow-up notes from your care team     Return in about 4 weeks (around 11/6/2018).      Your next 10 appointments already scheduled     Oct 17, 2018  2:30 PM CDT   Lab with  LAB   [facility] ([facility] [place])    [address]  1st Floor  [facility] [address]   [phone]            Oct 17, 2018  4:00 PM CDT   (Arrive by 3:30 PM)   Return Visit with Kathryn Basilio MD   [facility] Nephrology ([facility])    [address]  [address]  [facility] [address]   [phone]            Nov 06, 2018 10:30 AM CST   (Arrive by 10:15 AM)   RETURN FOOT/ANKLE with Alvaro Gautam MD   [facility] ([facility])    [address]  4th Floor  [facility] [address] [phone]            Nov 08, 2018 11:30 AM CST   (Arrive by 11:15 AM)   RETURN DIABETES with Arabella Kamara PA-C   [facility] Endocrinology ([facility])    [address]  3rd Floor  [facility] [address] [phone]            Nov 08, 2018 12:30 PM CST   (Arrive by 12:15 PM)   Office Visit with Brenden Blackwell [facility] ([facility])    [address]  3rd [facility] [address] [phone]           Bring a current list of meds and any records pertaining to this visit. For Physicals, please bring immunization records and any forms needing to be filled out. Please arrive 10 minutes early to complete paperwork.             Jan 22, 2019  3:20 PM CST   CT CHEST W/O CONTRAST with UCCT2   [facility] CT ([facility])    [address]  1st Floor  [facility] [address] [phone]           How do I prepare for my exam? (Food and drink instructions) No Food and Drink Restrictions.  How do I prepare for my exam? (Other instructions) You do not need to do anything special to prepare for this exam. For a sinus scan: Use your nose spray (nasal decongestant spray) as directed.  What should I wear: Please wear loose clothing, such as a sweat suit or jogging clothes. Avoid snaps, zippers and other metal. We may ask you to undress and put on a hospital gown.  How long does the exam take: Most scans take less than 20 minutes.  What should I bring: Please bring any scans or X-rays taken at other hospitals, if similar tests were done. Also bring a list of your medicines, including vitamins, minerals and over-the-counter drugs. It is safest to leave personal items at home.  Do I need a : No  is needed.  What do I need to tell my doctor? Be sure to tell your doctor: * If you have any allergies. * If there s any chance you are pregnant. * If you are breastfeeding.  What should I do after the exam: No restrictions, You may resume normal activities.  What is this test: A CT (computed tomography) scan is a series of pictures that allows us to look inside your body. The scanner creates images of the body in cross sections, much like slices of bread. This helps us see any problems more clearly.  Who should I call with questions: If you have any questions, please call the Imaging Department where you will have your exam. Directions, parking instructions, and other information is available on our website, [email or web address].            Jan 22, 2019  4:30 PM CST   (Arrive by 4:15 PM)   Return Visit with Ravin King MD   [facility] ([facility] [place])     [address]  [address]  [facility] [id number] [phone]              Who to contact     Please call your clinic at [phone] to:    Ask questions about your health    Make or cancel appointments    Discuss your medicines    Learn about your test results    Speak to your doctor            Additional Information About Your Visit        MyChart Information     [employer] gives you secure access to your electronic health record. If you see a primary care provider, you can also send messages to your care team and make appointments. If you have questions, please call your primary care clinic.  If you do not have a primary care provider, please call [phone] and they will assist you.      [employer] is an electronic gateway that provides easy, online access to your medical records. With [employer], you can request a clinic appointment, read your test results, renew a prescription or communicate with your care team.     To access your existing account, please contact your [facility] or call [phone] for assistance.        Care EveryWhere ID     This is your Care EveryWhere ID. This could be used by other organizations to access your [place] medical records  [id number]         Blood Pressure from Last 3 Encounters:   10/08/18 157/61   09/26/18 143/68   09/14/18 120/68    Weight from Last 3 Encounters:   10/08/18 84.4 kg (186 lb)   09/26/18 83.1 kg (183 lb 3.2 oz)   09/14/18 84.4 kg (186 lb)              Today, you had the following     No orders found for display       Primary Care Provider Office Phone # Fax #    Noam Luis Jackson [phone]       [address] [place], [place] [address] [address] [address]        Equal Access to Services     KALYANI WARREN : Hadraya Lam, kristin crocker, madeline almodovar. So [facility] [phone].    ATENCIÓN: Si habla español, tiene a santoro disposición servicios  jordon de asistencia lingüística. Renuka chua [phone].    We comply with applicable federal civil rights laws and [place] laws. We do not discriminate on the basis of race, color, national origin, age, disability, sex, sexual orientation, or gender identity.            Thank you!     Thank you for choosing [facility]  for your care. Our goal is always to provide you with excellent care. Hearing back from our patients is one way we can continue to improve our services. Please take a few minutes to complete the written survey that you may receive in the mail after your visit with us. Thank you!             Your Updated Medication List - Protect others around you: Learn how to safely use, store and throw away your medicines at www.disposemymeds.org.          This list is accurate as of 10/9/18 11:59 PM.  Always use your most recent med list.                   Brand Name Dispense Instructions for use Diagnosis    acetaminophen 325 MG tablet    TYLENOL    100 tablet    Take 2 tablets (650 mg) by mouth every 4 hours as needed for mild pain    Diabetic ulcer of toe of right foot associated with type 2 diabetes mellitus, with other ulcer severity (H)       albuterol 108 (90 Base) MCG/ACT inhaler    PROAIR HFA/PROVENTIL HFA/VENTOLIN HFA    3 Inhaler    Inhale 2 puffs into the lungs every 6 hours as needed for shortness of breath / dyspnea or wheezing    Cough       * ATORVASTATIN CALCIUM PO      Take 20 mg by mouth every morning        * atorvastatin 20 MG tablet    LIPITOR    90 tablet    TAKE 1 TABLET BY MOUTH ONCE DAILY    Hyperlipidemia LDL goal <100       BASAGLAR 100 UNIT/ML injection     3 mL    Inject 20 Units Subcutaneous At Bedtime Inject 22 U SubCut at HS    Type 2 diabetes mellitus with diabetic neuropathy, with long-term current use of insulin (H)       carvedilol 12.5 MG tablet    COREG    60 tablet    Take 1 tablet (12.5 mg) by mouth 2 times daily (with meals)    Essential hypertension  with goal blood pressure less than 140/90       clindamycin 1 % lotion    CLEOCIN T    60 mL    Apply topically 2 times daily    Intertrigo       famotidine 20 MG tablet    PEPCID    60 tablet    Take 1 tablet (20 mg) by mouth daily    Right foot pain       furosemide 20 MG tablet    LASIX    60 tablet    Take 1 tablet (20 mg) by mouth 2 times daily    Hypertension goal BP (blood pressure) < 140/90       HYDROcodone-acetaminophen 5-325 MG per tablet    NORCO    20 tablet    Pain 1-5 take 1 tab, 6-10 take 2 tabs every 4 hours as needed    Right foot pain       hydrOXYzine 10 MG tablet    ATARAX    30 tablet    Take 1 tablet (10 mg) by mouth every 4 hours as needed for itching (pain, anxiety)    Right foot pain       insulin pen needle 31G X 6 MM    ULTICARE MINI    100 each    Use daily or as directed.    Type 2 diabetes, HbA1c goal < 7% (H)       NovoLOG FLEXPEN 100 UNIT/ML injection   Generic drug:  insulin aspart     15 mL    4 Units Inject 4 units SQ with breakfast, lunch and dinner. IF SNACK BETWEEN LUNCH AND DINNER SHE TAKES AN EXTRA 2 UNITS    Type 2 diabetes mellitus with hyperglycemia, with long-term current use of insulin (H)       ondansetron 4 MG tablet    ZOFRAN    18 tablet    Take 1 tablet (4 mg) by mouth every 12 hours as needed for nausea    Nausea       ONETOUCH ULTRA test strip   Generic drug:  blood glucose monitoring     400 strip    TEST YOUR BLOOD SUGAR 3-4 TIMES PER DAY.    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    Dermatitis       vitamin D 2000 units tablet     100 tablet    Take 2,000 Units by mouth daily    Vitamin D deficiency       * Notice:  This list has 5 medication(s) that are the same as other medications prescribed for you. Read the directions carefully, and ask your doctor or other care provider to review them with you.

## 2018-10-09 NOTE — TELEPHONE ENCOUNTER
Tobacco Treatment Team at the [facility] attempted to reach Ms. Herr on 10/9/2018 regarding the tobacco cessation program to help Ms. Herr to quit smoking. We will attempt to reach Ms. Herr another time.       Spoke with patient's daughter the other week regarding program who was not with the patient. Said I could call patient another time to discuss program.

## 2018-10-09 NOTE — NURSING NOTE
Reason For Visit:   Chief Complaint   Patient presents with     Right Foot - Surgical Followup       There were no vitals taken for this visit.    Pain Assessment  Patient Currently in Pain: Yes  0-10 Pain Scale: 1  Primary Pain Location: Foot  Pain Orientation: Right  Pain Descriptors: Pins and needles  Aggravating Factors: Movement    Jean Paul Patino, ATC

## 2018-10-09 NOTE — LETTER
10/9/2018       RE: Supriya Herr  [address]  [facility] [address]     Dear Colleague,    Thank you for referring your patient, Supriya Herr, to the [facility]. Please see a copy of my visit note below.    CHIEF COMPLAINT:  Status post right second metatarsal head excision with partial first metatarsal excision of hardware removal performed on 09/26/2018.      HISTORY OF PRESENT ILLNESS:  Ms. Herr presents today for further followup.  Denies to have any problems or complaints.  Reports to be doing well.      PHYSICAL EXAMINATION:  On today's visit, she presents with well-healed surgical incisions.  CMS is unchanged.  There are no pressure points.  On today's visit, we proceeded with removal of the sutures, which was done without any complications.      ASSESSMENT:  Status post right second metatarsal head excision with partial first metatarsal excision of hardware removal.      PLAN:  Discussed with patient that at this point we are going to proceed with ambulating as tolerated with an unloading device.  A diabetic CAM Walker, I think will be the deal and she reports to already have one.      The patient will follow up in 6 weeks from surgery.  At that time, no x-rays will be obtained.  All questions were answered.  The patient was pleased with the discussion.         Alvaro Gautam MD

## 2018-10-09 NOTE — PROGRESS NOTES
CHIEF COMPLAINT:  Status post right second metatarsal head excision with partial first metatarsal excision of hardware removal performed on 09/26/2018.      HISTORY OF PRESENT ILLNESS:  Ms. Herr presents today for further followup.  Denies to have any problems or complaints.  Reports to be doing well.      PHYSICAL EXAMINATION:  On today's visit, she presents with well-healed surgical incisions.  CMS is unchanged.  There are no pressure points.  On today's visit, we proceeded with removal of the sutures, which was done without any complications.      ASSESSMENT:  Status post right second metatarsal head excision with partial first metatarsal excision of hardware removal.      PLAN:  Discussed with patient that at this point we are going to proceed with ambulating as tolerated with an unloading device.  A diabetic CAM Walker, I think will be the deal and she reports to already have one.      The patient will follow up in 6 weeks from surgery.  At that time, no x-rays will be obtained.  All questions were answered.  The patient was pleased with the discussion.

## 2018-10-09 NOTE — TELEPHONE ENCOUNTER
Routing refill request to provider for review/approval because:  Labs out of range:  CKT    Viridiana Sprague RN   [facility]

## 2018-10-10 ENCOUNTER — NURSING HOME VISIT (OUTPATIENT)
Dept: GERIATRICS | Facility: CLINIC | Age: 68
End: 2018-10-10
Payer: MEDICARE

## 2018-10-10 VITALS
BODY MASS INDEX: 30.99 KG/M2 | HEIGHT: 65 IN | RESPIRATION RATE: 18 BRPM | HEART RATE: 68 BPM | DIASTOLIC BLOOD PRESSURE: 70 MMHG | OXYGEN SATURATION: 98 % | TEMPERATURE: 98.2 F | WEIGHT: 186 LBS | SYSTOLIC BLOOD PRESSURE: 150 MMHG

## 2018-10-10 DIAGNOSIS — Z79.4 TYPE 2 DIABETES MELLITUS WITH DIABETIC NEUROPATHY, WITH LONG-TERM CURRENT USE OF INSULIN (H): Primary | ICD-10-CM

## 2018-10-10 DIAGNOSIS — R53.81 PHYSICAL DECONDITIONING: ICD-10-CM

## 2018-10-10 DIAGNOSIS — E11.40 TYPE 2 DIABETES MELLITUS WITH DIABETIC NEUROPATHY, WITH LONG-TERM CURRENT USE OF INSULIN (H): Primary | ICD-10-CM

## 2018-10-10 DIAGNOSIS — I10 HYPERTENSION GOAL BP (BLOOD PRESSURE) < 140/90: ICD-10-CM

## 2018-10-10 PROCEDURE — 99309 SBSQ NF CARE MODERATE MDM 30: CPT | Performed by: NURSE PRACTITIONER
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
## 2018-10-15 ENCOUNTER — DISCHARGE SUMMARY NURSING HOME (OUTPATIENT)
Dept: GERIATRICS | Facility: CLINIC | Age: 68
End: 2018-10-15
Payer: MEDICARE

## 2018-10-15 VITALS
DIASTOLIC BLOOD PRESSURE: 77 MMHG | RESPIRATION RATE: 18 BRPM | HEART RATE: 70 BPM | TEMPERATURE: 97.7 F | OXYGEN SATURATION: 98 % | BODY MASS INDEX: 30.99 KG/M2 | HEIGHT: 65 IN | SYSTOLIC BLOOD PRESSURE: 152 MMHG | WEIGHT: 186 LBS

## 2018-10-15 DIAGNOSIS — N18.5 ANEMIA IN STAGE 5 CHRONIC KIDNEY DISEASE, NOT ON CHRONIC DIALYSIS (H): ICD-10-CM

## 2018-10-15 DIAGNOSIS — I10 ESSENTIAL HYPERTENSION: ICD-10-CM

## 2018-10-15 DIAGNOSIS — F33.1 MODERATE RECURRENT MAJOR DEPRESSION (H): ICD-10-CM

## 2018-10-15 DIAGNOSIS — L97.519 PLANTAR ULCER OF RIGHT FOOT, UNSPECIFIED ULCER STAGE (H): ICD-10-CM

## 2018-10-15 DIAGNOSIS — N18.30 CKD (CHRONIC KIDNEY DISEASE) STAGE 3, GFR 30-59 ML/MIN (H): ICD-10-CM

## 2018-10-15 DIAGNOSIS — D63.1 ANEMIA IN STAGE 5 CHRONIC KIDNEY DISEASE, NOT ON CHRONIC DIALYSIS (H): ICD-10-CM

## 2018-10-15 DIAGNOSIS — E11.22 TYPE 2 DIABETES MELLITUS WITH DIABETIC CHRONIC KIDNEY DISEASE, UNSPECIFIED CKD STAGE, UNSPECIFIED WHETHER LONG TERM INSULIN USE (H): ICD-10-CM

## 2018-10-15 DIAGNOSIS — Z48.89 AFTERCARE FOLLOWING SURGERY: Primary | ICD-10-CM

## 2018-10-15 PROCEDURE — 99316 NF DSCHRG MGMT 30 MIN+: CPT | Performed by: NURSE PRACTITIONER
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
## 2018-10-16 ENCOUNTER — TELEPHONE (OUTPATIENT)
Dept: GERIATRICS | Facility: CLINIC | Age: 68
End: 2018-10-16

## 2018-10-16 DIAGNOSIS — N18.5 CKD (CHRONIC KIDNEY DISEASE) STAGE 5, GFR LESS THAN 15 ML/MIN (H): Primary | ICD-10-CM

## 2018-10-17 ENCOUNTER — OFFICE VISIT (OUTPATIENT)
Dept: NEPHROLOGY | Facility: CLINIC | Age: 68
End: 2018-10-17
Attending: INTERNAL MEDICINE
Payer: MEDICARE

## 2018-10-17 VITALS
SYSTOLIC BLOOD PRESSURE: 167 MMHG | DIASTOLIC BLOOD PRESSURE: 72 MMHG | TEMPERATURE: 98.2 F | OXYGEN SATURATION: 97 % | HEART RATE: 66 BPM

## 2018-10-17 DIAGNOSIS — N18.5 CKD (CHRONIC KIDNEY DISEASE) STAGE 5, GFR LESS THAN 15 ML/MIN (H): Primary | ICD-10-CM

## 2018-10-17 DIAGNOSIS — N18.5 CKD (CHRONIC KIDNEY DISEASE) STAGE 5, GFR LESS THAN 15 ML/MIN (H): ICD-10-CM

## 2018-10-17 LAB
ALBUMIN SERPL-MCNC: 2.5 G/DL (ref 3.4–5)
ANION GAP SERPL CALCULATED.3IONS-SCNC: 7 MMOL/L (ref 3–14)
BUN SERPL-MCNC: 66 MG/DL (ref 7–30)
CALCIUM SERPL-MCNC: 8.2 MG/DL (ref 8.5–10.1)
CHLORIDE SERPL-SCNC: 113 MMOL/L (ref 94–109)
CO2 SERPL-SCNC: 23 MMOL/L (ref 20–32)
CREAT SERPL-MCNC: 3.37 MG/DL (ref 0.52–1.04)
GFR SERPL CREATININE-BSD FRML MDRD: 14 ML/MIN/1.7M2
GLUCOSE SERPL-MCNC: 136 MG/DL (ref 70–99)
HGB BLD-MCNC: 9 G/DL (ref 11.7–15.7)
PHOSPHATE SERPL-MCNC: 4.6 MG/DL (ref 2.5–4.5)
POTASSIUM SERPL-SCNC: 4.5 MMOL/L (ref 3.4–5.3)
PTH-INTACT SERPL-MCNC: 127 PG/ML (ref 18–80)
SODIUM SERPL-SCNC: 143 MMOL/L (ref 133–144)

## 2018-10-17 PROCEDURE — 82306 VITAMIN D 25 HYDROXY: CPT | Performed by: INTERNAL MEDICINE

## 2018-10-17 PROCEDURE — G0463 HOSPITAL OUTPT CLINIC VISIT: HCPCS | Mod: ZF

## 2018-10-17 PROCEDURE — 80069 RENAL FUNCTION PANEL: CPT | Performed by: INTERNAL MEDICINE

## 2018-10-17 PROCEDURE — 36415 COLL VENOUS BLD VENIPUNCTURE: CPT | Performed by: INTERNAL MEDICINE

## 2018-10-17 PROCEDURE — 85018 HEMOGLOBIN: CPT | Performed by: INTERNAL MEDICINE

## 2018-10-17 PROCEDURE — 83970 ASSAY OF PARATHORMONE: CPT | Performed by: INTERNAL MEDICINE

## 2018-10-17 NOTE — LETTER
10/17/2018      RE: Supriya Herr  [address]  [facility] [address]       Patient not seen due to ride unable to wait for her.   Chart reviewed and patient will be called  BP high - will plan to increase furosemide    Kathryn Abundio Basilio MD

## 2018-10-17 NOTE — TELEPHONE ENCOUNTER
Discharge planned for today was patient driven, therapy wanted to work with her longer, but her daughter felt she would do better at home. Patient's daughter Caroline has changed her mind regarding discharge. Caroline is going to [employer] for work 10/17 to 10/27. She has decided that Supriya will come home on 10/27/18 and then they will resume her day program at [facility] and do outpatient therapy if needed.

## 2018-10-17 NOTE — NURSING NOTE
Chief Complaint   Patient presents with     RECHECK     CKD (chronic kidney disease) stage 5     /72  Pulse 66  Temp 98.2  F (36.8  C) (Oral)  SpO2 97%  Lorie Dover MA

## 2018-10-17 NOTE — MR AVS SNAPSHOT
After Visit Summary   10/17/2018    Supriya Herr    MRN: [id number]           Patient Information     Date Of Birth          1950        Visit Information        Provider Department      10/17/2018 4:00 PM Kathryn Basilio MD [facility] Nephrology        Today's Diagnoses     CKD (chronic kidney disease) stage 5, GFR less than 15 ml/min (H)    -  1       Follow-ups after your visit        Follow-up notes from your care team     Return in about 4 weeks (around 11/14/2018).      Your next 10 appointments already scheduled     Oct 19, 2018  9:15 AM CDT   (Arrive by 9:00 AM)   Return Visit with Lita Reynoso MD   [facility] Dermatology ([facility])    [address] [place] [id number]   [phone]            Nov 06, 2018 10:30 AM CST   (Arrive by 10:15 AM)   RETURN FOOT/ANKLE with Alvaro Gautam MD   [facility] ([facility])    [address] [place] [address]   [phone]            Nov 08, 2018 11:30 AM CST   (Arrive by 11:15 AM)   RETURN DIABETES with Arabella Kamara PA-C   [facility] Endocrinology ([facility])    [address] [place] [address]   [phone]            Nov 08, 2018 12:30 PM CST   (Arrive by 12:15 PM)   Office Visit with Brenden Blackwell RPH   [facility] Medication Therapy Management ([facility])    [address] [place] [id number] [phone]           Bring a current list of meds and any records pertaining to this visit. For Physicals, please bring immunization records and any forms needing to be filled out. Please arrive 10 minutes early to complete paperwork.            Jan 22, 2019  3:20 PM CST   CT CHEST W/O CONTRAST with UCCT2   [facility] Imaging Center CT ([facility])    [address] [place] [address]  Se  1st Floor  [facility] [id number] [phone]           How do I prepare for my exam? (Food and drink instructions) No Food and Drink Restrictions.  How do I prepare for my exam? (Other instructions) You do not need to do anything special to prepare for this exam. For a sinus scan: Use your nose spray (nasal decongestant spray) as directed.  What should I wear: Please wear loose clothing, such as a sweat suit or jogging clothes. Avoid snaps, zippers and other metal. We may ask you to undress and put on a hospital gown.  How long does the exam take: Most scans take less than 20 minutes.  What should I bring: Please bring any scans or X-rays taken at other hospitals, if similar tests were done. Also bring a list of your medicines, including vitamins, minerals and over-the-counter drugs. It is safest to leave personal items at home.  Do I need a : No  is needed.  What do I need to tell my doctor? Be sure to tell your doctor: * If you have any allergies. * If there s any chance you are pregnant. * If you are breastfeeding.  What should I do after the exam: No restrictions, You may resume normal activities.  What is this test: A CT (computed tomography) scan is a series of pictures that allows us to look inside your body. The scanner creates images of the body in cross sections, much like slices of bread. This helps us see any problems more clearly.  Who should I call with questions: If you have any questions, please call the Imaging Department where you will have your exam. Directions, parking instructions, and other information is available on our website, [email or web address].            Jan 22, 2019  4:30 PM CST   (Arrive by 4:15 PM)   Return Visit with Ravin King MD   [facility] ([facility])    [address]  [address]  [facility] [id number] [phone]              Future tests that were ordered for you today     Open Future  Orders        Priority Expected Expires Ordered    Protein  random urine with Creat Ratio Routine 10/17/2018 10/17/2019 10/17/2018            Who to contact     If you have questions or need follow up information about today's clinic visit or your schedule please contact [facility] NEPHROLOGY directly at [phone].  Normal or non-critical lab and imaging results will be communicated to you by MyChart, letter or phone within 4 business days after the clinic has received the results. If you do not hear from us within 7 days, please contact the clinic through [employer] or phone. If you have a critical or abnormal lab result, we will notify you by phone as soon as possible.  Submit refill requests through [employer] or call your pharmacy and they will forward the refill request to us. Please allow 3 business days for your refill to be completed.          Additional Information About Your Visit        MyChart Information     [employer] gives you secure access to your electronic health record. If you see a primary care provider, you can also send messages to your care team and make appointments. If you have questions, please call your primary care clinic.  If you do not have a primary care provider, please call [phone] and they will assist you.        Care EveryWhere ID     This is your Care EveryWhere ID. This could be used by other organizations to access your [place] medical records  [id number]        Your Vitals Were     Pulse Temperature Pulse Oximetry             66 98.2  F (36.8  C) (Oral) 97%          Blood Pressure from Last 3 Encounters:   10/17/18 167/72   10/15/18 152/77   10/10/18 150/70    Weight from Last 3 Encounters:   10/15/18 84.4 kg (186 lb)   10/10/18 84.4 kg (186 lb)   10/08/18 84.4 kg (186 lb)              Today, you had the following     No orders found for display       Primary Care Provider Office Phone # Fax #    Noam Jackson [phone]       [address] [address]  [facility] [address]        Equal Access to Services     KALYANI WARREN : Hadii aad ku hadmicaeladebbie Jameeali, waulyssesda luqadaha, qaybta kaalmashell parisi, madeline chaudhari. So [facility] [phone].    ATENCIÓN: Si habla español, tiene a santoro disposición servicios gratuitos de asistencia lingüística. Xeniaame al [phone].    We comply with applicable federal civil rights laws and [place] laws. We do not discriminate on the basis of race, color, national origin, age, disability, sex, sexual orientation, or gender identity.            Thank you!     Thank you for choosing [facility] NEPHROLOGY  for your care. Our goal is always to provide you with excellent care. Hearing back from our patients is one way we can continue to improve our services. Please take a few minutes to complete the written survey that you may receive in the mail after your visit with us. Thank you!             Your Updated Medication List - Protect others around you: Learn how to safely use, store and throw away your medicines at www.disposemymeds.org.          This list is accurate as of 10/17/18 11:59 PM.  Always use your most recent med list.                   Brand Name Dispense Instructions for use Diagnosis    acetaminophen 325 MG tablet    TYLENOL    100 tablet    Take 2 tablets (650 mg) by mouth every 4 hours as needed for mild pain    Diabetic ulcer of toe of right foot associated with type 2 diabetes mellitus, with other ulcer severity (H)       albuterol 108 (90 Base) MCG/ACT inhaler    PROAIR HFA/PROVENTIL HFA/VENTOLIN HFA    3 Inhaler    Inhale 2 puffs into the lungs every 6 hours as needed for shortness of breath / dyspnea or wheezing    Cough       atorvastatin 20 MG tablet    LIPITOR    90 tablet    TAKE 1 TABLET BY MOUTH ONCE DAILY    Hyperlipidemia LDL goal <100       BASAGLAR 100 UNIT/ML injection     3 mL    Inject 20 Units Subcutaneous At Bedtime Inject 22 U SubCut at HS    Type 2 diabetes mellitus with  diabetic neuropathy, with long-term current use of insulin (H)       carvedilol 12.5 MG tablet    COREG    60 tablet    Take 1 tablet (12.5 mg) by mouth 2 times daily (with meals)    Essential hypertension with goal blood pressure less than 140/90       clindamycin 1 % lotion    CLEOCIN T    60 mL    Apply topically 2 times daily    Intertrigo       famotidine 20 MG tablet    PEPCID    60 tablet    Take 1 tablet (20 mg) by mouth daily    Right foot pain       furosemide 20 MG tablet    LASIX    60 tablet    Take 1 tablet (20 mg) by mouth 2 times daily    Hypertension goal BP (blood pressure) < 140/90       HYDROcodone-acetaminophen 5-325 MG per tablet    NORCO    20 tablet    Pain 1-5 take 1 tab, 6-10 take 2 tabs every 4 hours as needed    Right foot pain       hydrOXYzine 10 MG tablet    ATARAX    30 tablet    Take 1 tablet (10 mg) by mouth every 4 hours as needed for itching (pain, anxiety)    Right foot pain       insulin pen needle 31G X 6 MM    ULTICARE MINI    100 each    Use daily or as directed.    Type 2 diabetes, HbA1c goal < 7% (H)       NovoLOG FLEXPEN 100 UNIT/ML injection   Generic drug:  insulin aspart     15 mL    4 Units Inject 4 units SQ with breakfast, lunch and dinner. IF SNACK BETWEEN LUNCH AND DINNER SHE TAKES AN EXTRA 2 UNITS    Type 2 diabetes mellitus with hyperglycemia, with long-term current use of insulin (H)       ondansetron 4 MG tablet    ZOFRAN    18 tablet    Take 1 tablet (4 mg) by mouth every 12 hours as needed for nausea    Nausea       ONETOUCH ULTRA test strip   Generic drug:  blood glucose monitoring     400 strip    TEST YOUR BLOOD SUGAR 3-4 TIMES PER DAY.    Type 2 diabetes mellitus with hyperglycemia, with long-term current use of insulin (H)       order for DME     1 Device    Equipment being ordered: Compression stockings, 20-30 MMHG, knee high    Edema, Hypertension goal BP (blood pressure) < 140/90       * order for DME     2 Device    Equipment being ordered: [place]  stocking  Below the knee 15-20 mg Dispense 2 Use daily    Localized edema       * order for DME     1 Device    1 wheelchair    Traumatic amputation of lower extremity above knee, unspecified laterality, subsequent encounter (H), CKD (chronic kidney disease) stage 3, GFR 30-59 ml/min (H), Type 2 diabetes mellitus with stage 3 chronic kidney disease, without long-term current use of insulin (H)       * order for DME     1 Units    Equipment being ordered: Right Lower extremity Solaris Ready wrap calf piece:  Size small/length tall , knee piece: Size small , Thigh piece size small/length average.    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    Dermatitis       vitamin D 2000 units tablet     100 tablet    Take 2,000 Units by mouth daily    Vitamin D deficiency       * Notice:  This list has 3 medication(s) that are the same as other medications prescribed for you. Read the directions carefully, and ask your doctor or other care provider to review them with you.

## 2018-10-18 LAB — DEPRECATED CALCIDIOL+CALCIFEROL SERPL-MC: 27 UG/L (ref 20–75)

## 2018-10-18 NOTE — PROGRESS NOTES
Patient not seen due to ride unable to wait for her.   Chart reviewed and patient will be called  BP high - will plan to increase furosemide    Kathryn Abundio Basilio

## 2018-10-19 ENCOUNTER — OFFICE VISIT (OUTPATIENT)
Dept: DERMATOLOGY | Facility: CLINIC | Age: 68
End: 2018-10-19
Payer: MEDICARE

## 2018-10-19 DIAGNOSIS — L20.89 OTHER ATOPIC DERMATITIS: Primary | ICD-10-CM

## 2018-10-19 DIAGNOSIS — L85.3 XEROSIS OF SKIN: ICD-10-CM

## 2018-10-19 ASSESSMENT — PAIN SCALES - GENERAL: PAINLEVEL: NO PAIN (0)
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
## 2018-10-19 NOTE — MR AVS SNAPSHOT
After Visit Summary   10/19/2018    Supriya Herr    MRN: [id number]           Patient Information     Date Of Birth          1950        Visit Information        Provider Department      10/19/2018 9:15 AM Lita Reynoso MD [facility] Dermatology        Today's Diagnoses     Other atopic dermatitis    -  1    Xerosis of skin           Follow-ups after your visit        Your next 10 appointments already scheduled     Nov 15, 2018  4:30 PM CST   Return Visit with Orly Dumont MD   [facility] ([facility])    [address] [facility] [address] [phone]            Nov 30, 2018 11:00 AM CST   Office Visit with Noam Jackson [place]   [facility] [place]    [address] [id number] [phone]           Bring a current list of meds and any records pertaining to this visit. For Physicals, please bring immunization records and any forms needing to be filled out. Please arrive 10 minutes early to complete paperwork.            Dec 07, 2018  1:40 PM CST   (Arrive by 1:25 PM)   [facility]    [address] [place] [address]  [address] [facility] [id number] [phone]            Dec 07, 2018  2:30 PM CST   Lab with  LAB    [facility] ([facility])    [address] [place] [address]  1st [facility] [id number] [phone]            Dec 07, 2018  3:00 PM CST   (Arrive by 2:45 PM)   SHORT with Brenden Blackwell [facility] ([facility])    [address] [place] [address]  3rd [facility] [id number] [phone]            Dec 07, 2018  3:30 PM CST   (Arrive by 3:00 PM)   Return Visit with Silva Guerrero NP   [facility] ([facility])    [address]  [address]  [facility] [address]   [phone]            Jan 22, 2019  3:20 PM CST   CT CHEST W/O CONTRAST with UCCT2   [facility] ([facility])    [address]  1st Floor  [facility] [address]   [phone]           How do I prepare for my exam? (Food and drink instructions) No Food and Drink Restrictions.  How do I prepare for my exam? (Other instructions) You do not need to do anything special to prepare for this exam. For a sinus scan: Use your nose spray (nasal decongestant spray) as directed.  What should I wear: Please wear loose clothing, such as a sweat suit or jogging clothes. Avoid snaps, zippers and other metal. We may ask you to undress and put on a hospital gown.  How long does the exam take: Most scans take less than 20 minutes.  What should I bring: Please bring any scans or X-rays taken at other hospitals, if similar tests were done. Also bring a list of your medicines, including vitamins, minerals and over-the-counter drugs. It is safest to leave personal items at home.  Do I need a : No  is needed.  What do I need to tell my doctor? Be sure to tell your doctor: * If you have any allergies. * If there s any chance you are pregnant. * If you are breastfeeding.  What should I do after the exam: No restrictions, You may resume normal activities.  What is this test: A CT (computed tomography) scan is a series of pictures that allows us to look inside your body. The scanner creates images of the body in cross sections, much like slices of bread. This helps us see any problems more clearly.  Who should I call with questions: If you have any questions, please call the Imaging Department where you will have your exam. Directions, parking instructions, and other information is available on our website, [email or web address].            Jan 22, 2019  4:30 PM CST   (Arrive by 4:15 PM)   Return  Visit with Ravin King MD   [facility] ([facility])    [address]  [address]  [facility] [id number] [phone]            Feb 08, 2019 11:00 AM CST   (Arrive by 10:45 AM)   RETURN DIABETES with Arabella Kamara PA-C   [facility] ([facility])    [address]  3rd Floor  [facility] [address] [phone]              Who to contact     Please call your clinic at [phone] to:    Ask questions about your health    Make or cancel appointments    Discuss your medicines    Learn about your test results    Speak to your doctor            Additional Information About Your Visit        [employer] Information     [employer] gives you secure access to your electronic health record. If you see a primary care provider, you can also send messages to your care team and make appointments. If you have questions, please call your primary care clinic.  If you do not have a primary care provider, please call [phone] and they will assist you.      [employer] is an electronic gateway that provides easy, online access to your medical records. With [employer], you can request a clinic appointment, read your test results, renew a prescription or communicate with your care team.     To access your existing account, please contact your [facility] or call [phone] for assistance.        Care EveryWhere ID     This is your Care EveryWhere ID. This could be used by other organizations to access your [place] medical records  [id number]         Blood Pressure from Last 3 Encounters:   11/08/18 185/71   11/01/18 193/82   10/24/18 177/72    Weight from Last 3 Encounters:   11/06/18 86.4 kg (190 lb 6.4 oz)   11/01/18 86.6 kg (191 lb)   10/24/18 84.4 kg (186 lb)              Today, you had the following     No orders found for display       Primary Care Provider Office Phone # Fax #    Noam  Luis Jackson [phone]       [address] [facility] [address]  [facility] [address]        Equal Access to Services     KALYANI WARREN : Hadraya danisha jacobson xin Lam, waulyssesda luqaxel, qaanivalta kaalmada ailin, madeline bond gristere marin lajosé antonioheather chaudhari. So [facility] [phone].    ATENCIÓN: Si habla español, tiene a santoro disposición servicios gratuitos de asistencia lingüística. Llame al [phone].    We comply with applicable federal civil rights laws and [place] laws. We do not discriminate on the basis of race, color, national origin, age, disability, sex, sexual orientation, or gender identity.            Thank you!     Thank you for choosing [facility] DERMATOLOGY  for your care. Our goal is always to provide you with excellent care. Hearing back from our patients is one way we can continue to improve our services. Please take a few minutes to complete the written survey that you may receive in the mail after your visit with us. Thank you!             Your Updated Medication List - Protect others around you: Learn how to safely use, store and throw away your medicines at www.disposemymeds.org.          This list is accurate as of 10/19/18 11:59 PM.  Always use your most recent med list.                   Brand Name Dispense Instructions for use Diagnosis    acetaminophen 325 MG tablet    TYLENOL    100 tablet    Take 2 tablets (650 mg) by mouth every 4 hours as needed for mild pain    Diabetic ulcer of toe of right foot associated with type 2 diabetes mellitus, with other ulcer severity (H)       albuterol 108 (90 Base) MCG/ACT inhaler    PROAIR HFA/PROVENTIL HFA/VENTOLIN HFA    3 Inhaler    Inhale 2 puffs into the lungs every 6 hours as needed for shortness of breath / dyspnea or wheezing    Cough       atorvastatin 20 MG tablet    LIPITOR    90 tablet    TAKE 1 TABLET BY MOUTH ONCE DAILY    Hyperlipidemia LDL goal <100       BASAGLAR 100 UNIT/ML injection     3 mL    Inject 20 Units Subcutaneous At  Bedtime Inject 22 U SubCut at HS    Type 2 diabetes mellitus with diabetic neuropathy, with long-term current use of insulin (H)       carvedilol 12.5 MG tablet    COREG    60 tablet    Take 1 tablet (12.5 mg) by mouth 2 times daily (with meals)    Essential hypertension with goal blood pressure less than 140/90       famotidine 20 MG tablet    PEPCID    60 tablet    Take 1 tablet (20 mg) by mouth daily    Right foot pain       insulin pen needle 31G X 6 MM    ULTICARE MINI    100 each    Use daily or as directed.    Type 2 diabetes, HbA1c goal < 7% (H)       NovoLOG FLEXPEN 100 UNIT/ML injection   Generic drug:  insulin aspart     15 mL    4 Units Inject 4 units SQ with breakfast, lunch and dinner. IF SNACK BETWEEN LUNCH AND DINNER SHE TAKES AN EXTRA 2 UNITS    Type 2 diabetes mellitus with hyperglycemia, with long-term current use of insulin (H)       ONETOUCH ULTRA test strip   Generic drug:  blood glucose monitoring     400 strip    TEST YOUR BLOOD SUGAR 3-4 TIMES PER DAY.    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    Edema of lower extremity        order for DME     1 Device    1 SAD light    Seasonal affective disorder (H)       order for DME     1 Device    Equipment being ordered: toilet riser, lifetime need DX amputation of leg above the knee, S78.119    Traumatic amputation of right lower extremity above knee, subsequent encounter (H)       sertraline 25 MG tablet    ZOLOFT    30 tablet    TAKE 1 TABLET (25 MG) BY MOUTH DAILY    NICOLE (generalized anxiety disorder)       triamcinolone 0.1 % cream    KENALOG    30 g    Apply to sites of dermatitis- the abdomen, armpits, groin as needed.    Dermatitis       vitamin D 2000 units tablet     100 tablet    Take 2,000 Units by mouth daily    Vitamin D deficiency       * Notice:  This list has 3 medication(s) that are the same as other medications prescribed for you. Read the directions carefully, and ask your doctor or other care provider to review them with you.

## 2018-10-19 NOTE — NURSING NOTE
Chief Complaint   Patient presents with     Derm Problem     rash/discoloration on both hands, white spots and bright pink spots; worsening symptoms     Serene Banks, EMT

## 2018-10-23 DIAGNOSIS — I10 HYPERTENSION GOAL BP (BLOOD PRESSURE) < 140/90: ICD-10-CM

## 2018-10-23 RX ORDER — FUROSEMIDE 40 MG
40 TABLET ORAL 2 TIMES DAILY
Qty: 60 TABLET | Refills: 11 | Status: SHIPPED | OUTPATIENT
Start: 2018-10-23 | End: 2018-10-24 | Stop reason: DRUGHIGH
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
## 2018-10-24 ENCOUNTER — NURSING HOME VISIT (OUTPATIENT)
Dept: GERIATRICS | Facility: CLINIC | Age: 68
End: 2018-10-24
Payer: MEDICARE

## 2018-10-24 VITALS
RESPIRATION RATE: 18 BRPM | HEIGHT: 65 IN | TEMPERATURE: 97.7 F | DIASTOLIC BLOOD PRESSURE: 72 MMHG | SYSTOLIC BLOOD PRESSURE: 177 MMHG | OXYGEN SATURATION: 98 % | HEART RATE: 71 BPM | WEIGHT: 186 LBS | BODY MASS INDEX: 30.99 KG/M2

## 2018-10-24 DIAGNOSIS — D63.1 ANEMIA IN STAGE 5 CHRONIC KIDNEY DISEASE, NOT ON CHRONIC DIALYSIS (H): ICD-10-CM

## 2018-10-24 DIAGNOSIS — N18.5 ANEMIA IN STAGE 5 CHRONIC KIDNEY DISEASE, NOT ON CHRONIC DIALYSIS (H): ICD-10-CM

## 2018-10-24 DIAGNOSIS — N18.5 CKD (CHRONIC KIDNEY DISEASE) STAGE 5, GFR LESS THAN 15 ML/MIN (H): ICD-10-CM

## 2018-10-24 DIAGNOSIS — L97.519 PLANTAR ULCER OF RIGHT FOOT, UNSPECIFIED ULCER STAGE (H): ICD-10-CM

## 2018-10-24 DIAGNOSIS — Z48.89 AFTERCARE FOLLOWING SURGERY: Primary | ICD-10-CM

## 2018-10-24 DIAGNOSIS — E11.22 TYPE 2 DIABETES MELLITUS WITH DIABETIC CHRONIC KIDNEY DISEASE, UNSPECIFIED CKD STAGE, UNSPECIFIED WHETHER LONG TERM INSULIN USE (H): ICD-10-CM

## 2018-10-24 DIAGNOSIS — I10 ESSENTIAL HYPERTENSION: ICD-10-CM

## 2018-10-24 DIAGNOSIS — F33.1 MODERATE RECURRENT MAJOR DEPRESSION (H): ICD-10-CM

## 2018-10-24 PROCEDURE — 99309 SBSQ NF CARE MODERATE MDM 30: CPT | Performed by: NURSE PRACTITIONER

## 2018-10-24 RX ORDER — CLINDAMYCIN PHOSPHATE 10 MG/G
GEL TOPICAL 2 TIMES DAILY
COMMUNITY
End: 2020-04-01

## 2018-10-24 RX ORDER — EMOLLIENT BASE
CREAM (GRAM) TOPICAL 2 TIMES DAILY
COMMUNITY
End: 2019-02-15
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
## 2018-10-26 ENCOUNTER — DOCUMENTATION ONLY (OUTPATIENT)
Dept: OTHER | Facility: CLINIC | Age: 68
End: 2018-10-26

## 2018-10-29 NOTE — TELEPHONE ENCOUNTER
Tobacco Treatment Team at the [facility] attempted to reach Ms. Herr on 10/29/2018 regarding the tobacco cessation program to help Ms. Herr to quit smoking. We will attempt to reach Ms. Herr another time.

## 2018-10-30 ENCOUNTER — DOCUMENTATION ONLY (OUTPATIENT)
Dept: CARE COORDINATION | Facility: CLINIC | Age: 68
End: 2018-10-30

## 2018-10-30 NOTE — PROGRESS NOTES
Dear Dr. Jackson  Medicare Home Health regulations requires [facility] to provide an initial assessment visit either within 48 hours of the patient's return home, or on the physician ordered Start of Care date.    There will be a delay in the Initial Assessment for Supriya Herr; MRN [id number]  We anticipate the Start of care will be 10/31/18 .  We have been attempting to reach pt the past 2 days unsuccessfully.       Sincerely [facility]  Dipika Lozano  [phone]

## 2018-10-31 ENCOUNTER — TELEPHONE (OUTPATIENT)
Dept: FAMILY MEDICINE | Facility: CLINIC | Age: 68
End: 2018-10-31

## 2018-10-31 ENCOUNTER — TELEPHONE (OUTPATIENT)
Dept: NEPHROLOGY | Facility: CLINIC | Age: 68
End: 2018-10-31

## 2018-10-31 DIAGNOSIS — F41.1 GAD (GENERALIZED ANXIETY DISORDER): ICD-10-CM

## 2018-10-31 DIAGNOSIS — N18.5 CKD (CHRONIC KIDNEY DISEASE) STAGE 5, GFR LESS THAN 15 ML/MIN (H): Primary | ICD-10-CM

## 2018-10-31 RX ORDER — SERTRALINE HYDROCHLORIDE 25 MG/1
TABLET, FILM COATED ORAL
Qty: 30 TABLET | Refills: 0 | OUTPATIENT
Start: 2018-10-31

## 2018-10-31 NOTE — TELEPHONE ENCOUNTER
[facility] Call Center    Phone Message    May a detailed message be left on voicemail: yes    Reason for Call: Order(s): Other:   Reason for requested: Labs  Date needed: 11/1/18  Provider name: Chetna      Action Taken: Message routed to:  [facility] ([facility]): Please enter lab orders for tomorrow 11/1/18

## 2018-10-31 NOTE — TELEPHONE ENCOUNTER
"Requested Prescriptions   Pending Prescriptions Disp Refills     sertraline (ZOLOFT) 25 MG tablet [Pharmacy Med Name: SERTRALINE HCL 25 MG TABLET] 30 tablet 0    Last Written Prescription Date:  8/27/18  Last Fill Quantity: 30,  # refills: 3   Last office visit: 8/21/2018 with prescribing provider:  8/21/18   Future Office Visit:   Next 5 appointments (look out 90 days)     Nov 08, 2018 12:30 PM CST   (Arrive by 12:15 PM)   Office Visit with Brenden Blackwell [facility] ([facility])    [address] [id number] [phone]            Nov 09, 2018  3:00 PM CST   Office Visit with Noam Jackson MD   [facility] ([facility])    [address] [place] [id number] [phone]                Sig: TAKE ONE TABLET BY MOUTH DAILY    SSRIs Protocol Passed    10/31/2018 10:59 AM       Passed - Recent (12 mo) or future (30 days) visit within the authorizing provider's specialty    Patient had office visit in the last 12 months or has a visit in the next 30 days with authorizing provider or within the authorizing provider's specialty.  See \"Patient Info\" tab in inbasket, or \"Choose Columns\" in Meds & Orders section of the refill encounter.             Passed - Patient is age 18 or older       Passed - No active pregnancy on record       Passed - No positive pregnancy test in last 12 months        "

## 2018-10-31 NOTE — TELEPHONE ENCOUNTER
[facility] RN Kerri is asking for orders for skilled nursing start of care that are needed by today    Kerri can be reached @ [phone] rodney

## 2018-10-31 NOTE — TELEPHONE ENCOUNTER
Called pharmacy. Pt has refills on file. Last RX sent 08/27/18.    Ana Luisa Garcia RN  [facility]

## 2018-10-31 NOTE — TELEPHONE ENCOUNTER
Called Kerri. Verbal orders given per nursing protocol for skilled nursing start of care.    Ana Luisa Garcia RN  [facility]

## 2018-11-01 ENCOUNTER — TELEPHONE (OUTPATIENT)
Dept: FAMILY MEDICINE | Facility: CLINIC | Age: 68
End: 2018-11-01

## 2018-11-01 ENCOUNTER — OFFICE VISIT (OUTPATIENT)
Dept: NEPHROLOGY | Facility: CLINIC | Age: 68
End: 2018-11-01
Attending: INTERNAL MEDICINE
Payer: MEDICARE

## 2018-11-01 VITALS
SYSTOLIC BLOOD PRESSURE: 193 MMHG | RESPIRATION RATE: 18 BRPM | WEIGHT: 191 LBS | OXYGEN SATURATION: 98 % | TEMPERATURE: 98.2 F | HEART RATE: 64 BPM | DIASTOLIC BLOOD PRESSURE: 82 MMHG | BODY MASS INDEX: 31.78 KG/M2

## 2018-11-01 DIAGNOSIS — N18.5 CKD (CHRONIC KIDNEY DISEASE) STAGE 5, GFR LESS THAN 15 ML/MIN (H): ICD-10-CM

## 2018-11-01 DIAGNOSIS — E11.22 TYPE 2 DIABETES MELLITUS WITH STAGE 4 CHRONIC KIDNEY DISEASE, WITH LONG-TERM CURRENT USE OF INSULIN (H): ICD-10-CM

## 2018-11-01 DIAGNOSIS — D63.1 ANEMIA DUE TO STAGE 5 CHRONIC KIDNEY DISEASE, NOT ON CHRONIC DIALYSIS (H): ICD-10-CM

## 2018-11-01 DIAGNOSIS — N18.5 CKD (CHRONIC KIDNEY DISEASE) STAGE 5, GFR LESS THAN 15 ML/MIN (H): Primary | ICD-10-CM

## 2018-11-01 DIAGNOSIS — N18.4 TYPE 2 DIABETES MELLITUS WITH STAGE 4 CHRONIC KIDNEY DISEASE, WITH LONG-TERM CURRENT USE OF INSULIN (H): ICD-10-CM

## 2018-11-01 DIAGNOSIS — N18.5 ANEMIA DUE TO STAGE 5 CHRONIC KIDNEY DISEASE, NOT ON CHRONIC DIALYSIS (H): ICD-10-CM

## 2018-11-01 DIAGNOSIS — Z79.4 TYPE 2 DIABETES MELLITUS WITH STAGE 4 CHRONIC KIDNEY DISEASE, WITH LONG-TERM CURRENT USE OF INSULIN (H): ICD-10-CM

## 2018-11-01 LAB
ALBUMIN SERPL-MCNC: 2.6 G/DL (ref 3.4–5)
ANION GAP SERPL CALCULATED.3IONS-SCNC: 9 MMOL/L (ref 3–14)
BUN SERPL-MCNC: 53 MG/DL (ref 7–30)
CALCIUM SERPL-MCNC: 8.6 MG/DL (ref 8.5–10.1)
CHLORIDE SERPL-SCNC: 114 MMOL/L (ref 94–109)
CO2 SERPL-SCNC: 23 MMOL/L (ref 20–32)
CREAT SERPL-MCNC: 3.17 MG/DL (ref 0.52–1.04)
CREAT UR-MCNC: 65 MG/DL
GFR SERPL CREATININE-BSD FRML MDRD: 14 ML/MIN/1.7M2
GLUCOSE SERPL-MCNC: 131 MG/DL (ref 70–99)
HGB BLD-MCNC: 9 G/DL (ref 11.7–15.7)
PHOSPHATE SERPL-MCNC: 3.8 MG/DL (ref 2.5–4.5)
POTASSIUM SERPL-SCNC: 3.8 MMOL/L (ref 3.4–5.3)
PROT UR-MCNC: 4.37 G/L
PROT/CREAT 24H UR: 6.71 G/G CR (ref 0–0.2)
SODIUM SERPL-SCNC: 146 MMOL/L (ref 133–144)

## 2018-11-01 PROCEDURE — G0463 HOSPITAL OUTPT CLINIC VISIT: HCPCS | Mod: ZF

## 2018-11-01 PROCEDURE — 84156 ASSAY OF PROTEIN URINE: CPT | Performed by: INTERNAL MEDICINE

## 2018-11-01 PROCEDURE — 85018 HEMOGLOBIN: CPT | Performed by: INTERNAL MEDICINE

## 2018-11-01 PROCEDURE — 80069 RENAL FUNCTION PANEL: CPT | Performed by: INTERNAL MEDICINE

## 2018-11-01 PROCEDURE — 36415 COLL VENOUS BLD VENIPUNCTURE: CPT | Performed by: INTERNAL MEDICINE

## 2018-11-01 RX ORDER — FUROSEMIDE 40 MG
40 TABLET ORAL 2 TIMES DAILY
Qty: 60 TABLET | Refills: 11 | Status: SHIPPED | OUTPATIENT
Start: 2018-11-01 | End: 2019-02-15

## 2018-11-01 ASSESSMENT — PAIN SCALES - GENERAL: PAINLEVEL: MODERATE PAIN (4)

## 2018-11-01 NOTE — TELEPHONE ENCOUNTER
Gave verbal orders as requested    Closing encounter - no further actions needed at this time    David Boothe RN

## 2018-11-01 NOTE — NURSING NOTE
Chief Complaint   Patient presents with     RECHECK     CKD       /82  Pulse 64  Temp 98.2  F (36.8  C) (Oral)  Resp 18  Wt 86.6 kg (191 lb)  SpO2 98%  BMI 31.78 kg/m2    Jennifer Hollis [facility]  11/1/2018 4:32 PM

## 2018-11-01 NOTE — TELEPHONE ENCOUNTER
Reason for call:  Order   Order or referral being requested: Home care orders  Reason for request: NA  Date needed: as soon as possible  Has the patient been seen by the PCP for this problem? YES    Additional comments: Diana with [facility] care called to get verbal orders from Dr. Jackson for skilled nursing visits once a week for one week, two times a week for two weeks and once a week for two weeks with three as needed. She would also like PT and OT orders to come out and evaluate and treat-one time in the next ten days to assess. She would also like wound care orders for an ulcer on the patients right foot-specifically to wash the area with wound cleanser, apply abdominal pad for cushioning and wrap in rolled gauze daily and the patients daughter will complete this on days when a wound nurse doesn't come.    Phone number to reach patient:  Other phone number: [phone]    Best Time: Any    Can we leave a detailed message on this number?  YES
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
## 2018-11-01 NOTE — MR AVS SNAPSHOT
After Visit Summary   11/1/2018    Supriya Herr    MRN: [id number]           Patient Information     Date Of Birth          1950        Visit Information        Provider Department      11/1/2018 4:30 PM Kathryn Basilio MD [facility] Nephrology        Today's Diagnoses     CKD (chronic kidney disease) stage 5, GFR less than 15 ml/min (H)    -  1    Type 2 diabetes mellitus with stage 4 chronic kidney disease, with long-term current use of insulin (H)        Anemia due to stage 5 chronic kidney disease, 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 (H)           Follow-ups after your visit        Follow-up notes from your care team     Return in about 4 weeks (around 11/29/2018).      Your next 10 appointments already scheduled     Nov 06, 2018 10:30 AM CST   (Arrive by 10:15 AM)   RETURN FOOT/ANKLE with Alvaro Gautam MD   [facility] ([facility])    [address] [place] [address]  4th [facility] [id number] [phone]            Nov 08, 2018 11:30 AM CST   (Arrive by 11:15 AM)   RETURN DIABETES with Arabella Kamara PA-C   [facility] Endocrin[facility] ([facility])    [address] [place] [id number] [phone]            Nov 08, 2018 12:30 PM CST   (Arrive by 12:15 PM)   Office Visit with Brenden Blackwell [facility] ([facility])    [address] [place] [id number] [phone]           Bring a current list of meds and any records pertaining to this visit. For Physicals, please bring immunization records and any forms needing to be filled out. Please arrive 10 minutes early to complete paperwork.            Nov 09, 2018  3:00 PM CST   Office Visit with Noam Jackson MD   [facility] ([facility])    [address] [place] [id number] [phone]            Bring a current list of meds and any records pertaining to this visit. For Physicals, please bring immunization records and any forms needing to be filled out. Please arrive 10 minutes early to complete paperwork.            Dec 07, 2018  2:30 PM CST   Lab with  LAB   [facility] ([facility])    [address]  1st Floor  [facility] [address]   [phone]            Dec 07, 2018  3:30 PM CST   (Arrive by 3:00 PM)   Return Visit with Silva Guerrero NP   [facility] Nephrology ([facility])    [address]  [address]  [facility] [address]   [phone]            Jan 22, 2019  3:20 PM CST   CT CHEST W/O CONTRAST with UCCT2   [facility] [place] [facility] ([facility])    [address]  1st [facility] [id number] [phone]           How do I prepare for my exam? (Food and drink instructions) No Food and Drink Restrictions.  How do I prepare for my exam? (Other instructions) You do not need to do anything special to prepare for this exam. For a sinus scan: Use your nose spray (nasal decongestant spray) as directed.  What should I wear: Please wear loose clothing, such as a sweat suit or jogging clothes. Avoid snaps, zippers and other metal. We may ask you to undress and put on a hospital gown.  How long does the exam take: Most scans take less than 20 minutes.  What should I bring: Please bring any scans or X-rays taken at other hospitals, if similar tests were done. Also bring a list of your medicines, including vitamins, minerals and over-the-counter drugs. It is safest to leave personal items at home.  Do I need a : No  is needed.  What do I need to tell my doctor? Be sure to tell your doctor: * If you have any allergies. * If there s any chance you are pregnant. * If you are breastfeeding.  What should I do after the exam: No restrictions, You may resume normal  activities.  What is this test: A CT (computed tomography) scan is a series of pictures that allows us to look inside your body. The scanner creates images of the body in cross sections, much like slices of bread. This helps us see any problems more clearly.  Who should I call with questions: If you have any questions, please call the Imaging Department where you will have your exam. Directions, parking instructions, and other information is available on our website, [email or web address].            Jan 22, 2019  4:30 PM CST   (Arrive by 4:15 PM)   Return Visit with Ravin King MD   [facility] ([facility])    [address]  [address]  [place] [address] [phone]              Future tests that were ordered for you today     Open Future Orders        Priority Expected Expires Ordered    Iron and iron binding capacity Routine  12/1/2018 11/1/2018    Ferritin Routine  12/1/2018 11/1/2018            Who to contact     If you have questions or need follow up information about today's clinic visit or your schedule please contact [facility] NEPHROLOGY directly at [phone].  Normal or non-critical lab and imaging results will be communicated to you by [employer] [facility], letter or phone within 4 business days after the clinic has received the results. If you do not hear from us within 7 days, please contact the clinic through [email or web address] or phone. If you have a critical or abnormal lab result, we will notify you by phone as soon as possible.  Submit refill requests through Ahandyhand or call your pharmacy and they will forward the refill request to us. Please allow 3 business days for your refill to be completed.          Additional Information About Your Visit        [employer] Transcript [email or web address] Information     [employer] gives you secure access to your electronic health record. If you see a primary care provider, you can also send messages to your care team and make appointments. If you have  questions, please call your primary care clinic.  If you do not have a primary care provider, please call [phone] and they will assist you.        Care EveryWhere ID     This is your Care EveryWhere ID. This could be used by other organizations to access your [place] medical records  [id number]        Your Vitals Were     Pulse Temperature Respirations Pulse Oximetry BMI (Body Mass Index)       64 98.2  F (36.8  C) (Oral) 18 98% 31.78 kg/m2        Blood Pressure from Last 3 Encounters:   11/01/18 193/82   10/24/18 177/72   10/17/18 167/72    Weight from Last 3 Encounters:   11/01/18 86.6 kg (191 lb)   10/24/18 84.4 kg (186 lb)   10/15/18 84.4 kg (186 lb)                 Today's Medication Changes          These changes are accurate as of 11/1/18  5:13 PM.  If you have any questions, ask your nurse or doctor.               These medicines have changed or have updated prescriptions.        Dose/Directions    BASAGLAR 100 UNIT/ML injection   This may have changed:  additional instructions   Used for:  Type 2 diabetes mellitus with diabetic neuropathy, with long-term current use of insulin (H)        Dose:  20 Units   Inject 20 Units Subcutaneous At Bedtime Inject 22 U SubCut at HS   Quantity:  3 mL   Refills:  11       furosemide 40 MG tablet   Commonly known as:  LASIX   This may have changed:    - medication strength  - how much to take   Used for:  CKD (chronic kidney disease) stage 5, GFR less than 15 ml/min (H), Type 2 diabetes mellitus with stage 4 chronic kidney disease, with long-term current use of insulin (H), Anemia due to stage 5 chronic kidney disease, 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 (H)   Changed by:  Kathryn Basilio MD        Dose:  40 mg   Take 1 tablet (40 mg) by mouth 2 times daily   Quantity:  60 tablet   Refills:  11            Where to get your medicines      These medications were sent to [facility]/pharmacy #[id number] - [place], [place] - [address] [place] [address]  [id number]     Phone:  [phone]     furosemide 40 MG tablet                Primary Care Provider Office Phone # Fax #    Noam Luis Jackson [phone]       [address] [address]        Equal Access to Services     [facility] BRIANA : Hadii aad ku hadasho Soomaali, waaxda luqadaha, qaybta kaalmada adeegyada, waxay sonal haydarcyn adama adelacl sood . So [facility] [phone].    ATENCIÓN: Si habla español, tiene a santoro disposición servicios gratuitos de asistencia lingüística. Llame al [phone].    We comply with applicable federal civil rights laws and [place] laws. We do not discriminate on the basis of race, color, national origin, age, disability, sex, sexual orientation, or gender identity.            Thank you!     Thank you for choosing [facility] NEPHROLOGY  for your care. Our goal is always to provide you with excellent care. Hearing back from our patients is one way we can continue to improve our services. Please take a few minutes to complete the written survey that you may receive in the mail after your visit with us. Thank you!             Your Updated Medication List - Protect others around you: Learn how to safely use, store and throw away your medicines at www.disposemymeds.org.          This list is accurate as of 11/1/18  5:13 PM.  Always use your most recent med list.                   Brand Name Dispense Instructions for use Diagnosis    acetaminophen 325 MG tablet    TYLENOL    100 tablet    Take 2 tablets (650 mg) by mouth every 4 hours as needed for mild pain    Diabetic ulcer of toe of right foot associated with type 2 diabetes mellitus, with other ulcer severity (H)       albuterol 108 (90 Base) MCG/ACT inhaler    PROAIR HFA/PROVENTIL HFA/VENTOLIN HFA    3 Inhaler    Inhale 2 puffs into the lungs every 6 hours as needed for shortness of breath / dyspnea or wheezing    Cough       atorvastatin 20 MG tablet    LIPITOR    90 tablet    TAKE 1 TABLET BY  MOUTH ONCE DAILY    Hyperlipidemia LDL goal <100       BASAGLAR 100 UNIT/ML injection     3 mL    Inject 20 Units Subcutaneous At Bedtime Inject 22 U SubCut at HS    Type 2 diabetes mellitus with diabetic neuropathy, with long-term current use of insulin (H)       carvedilol 12.5 MG tablet    COREG    60 tablet    Take 1 tablet (12.5 mg) by mouth 2 times daily (with meals)    Essential hypertension with goal blood pressure less than 140/90       clindamycin 1 % topical gel    CLINDAMAX     Apply topically 2 times daily        emollient cream      Apply topically 2 times daily        famotidine 20 MG tablet    PEPCID    60 tablet    Take 1 tablet (20 mg) by mouth daily    Right foot pain       furosemide 40 MG tablet    LASIX    60 tablet    Take 1 tablet (40 mg) by mouth 2 times daily    CKD (chronic kidney disease) stage 5, GFR less than 15 ml/min (H), Type 2 diabetes mellitus with stage 4 chronic kidney disease, with long-term current use of insulin (H), Anemia due to stage 5 chronic kidney disease, 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 (H)       insulin pen needle 31G X 6 MM    ULTICARE MINI    100 each    Use daily or as directed.    Type 2 diabetes, HbA1c goal < 7% (H)       NovoLOG FLEXPEN 100 UNIT/ML injection   Generic drug:  insulin aspart     15 mL    4 Units Inject 4 units SQ with breakfast, lunch and dinner. IF SNACK BETWEEN LUNCH AND DINNER SHE TAKES AN EXTRA 2 UNITS    Type 2 diabetes mellitus with hyperglycemia, with long-term current use of insulin (H)       ONETOUCH ULTRA test strip   Generic drug:  blood glucose monitoring     400 strip    TEST YOUR BLOOD SUGAR 3-4 TIMES PER DAY.    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    Dermatitis       vitamin D 2000 units tablet     100 tablet    Take 2,000 Units by mouth daily    Vitamin D deficiency       * Notice:  This list has 3 medication(s) that are the same as other medications prescribed for you. Read the directions carefully, and ask your doctor or other care provider to review them with you.
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
## 2018-11-05 ENCOUNTER — TELEPHONE (OUTPATIENT)
Dept: FAMILY MEDICINE | Facility: CLINIC | Age: 68
End: 2018-11-05

## 2018-11-05 NOTE — TELEPHONE ENCOUNTER
Pt is doing well per daughter, she is having many appointments right now  Huddle with Dr. Jackson, if pt is doing well ok to reschedule appointment for 3 to 4 weeks from now    Daughter will call and reschedule appointment as requested by provider  Appointment for Friday cancelled    Viridiana Sprague RN   [facility] [place]

## 2018-11-05 NOTE — TELEPHONE ENCOUNTER
Reason for call:  Other   Patient called regarding (reason for call): call back  Additional comments: The patients daughter called and wanted to know if it was necessary for her mother to come to the appointment she has on Friday 11/9 with Dr. Jackson. She would like a call back to discuss this.    Phone number to reach patient:  Cell number on file:    Telephone Information:   Mobile [phone]       Best Time: Any    Can we leave a detailed message on this number?  YES

## 2018-11-06 ENCOUNTER — OFFICE VISIT (OUTPATIENT)
Dept: ORTHOPEDICS | Facility: CLINIC | Age: 68
End: 2018-11-06
Payer: MEDICARE

## 2018-11-06 VITALS — BODY MASS INDEX: 30.6 KG/M2 | HEIGHT: 66 IN | WEIGHT: 190.4 LBS

## 2018-11-06 DIAGNOSIS — M25.571 PAIN IN JOINT, ANKLE AND FOOT, RIGHT: Primary | ICD-10-CM

## 2018-11-06 NOTE — LETTER
11/6/2018       RE: Supriya Herr  [address]  [place] [address]     Dear Colleague,    Thank you for referring your patient, Supriya Herr, to the [facility]. Please see a copy of my visit note below.    CHIEF COMPLAINT:  Status post second metatarsal head excision with medial sesamoid and hardware removal performed 09/26/2018.      HISTORY OF PRESENT ILLNESS:  Ms. Herr presents today for further followup in the accompaniment of her daughter.  Denies to have any problems or complaints.  Reports to be compliant.      PHYSICAL EXAMINATION:  On today's visit, she presents with complete healing of all surgical incisions and absence of any ulcerations.  The foot presents an excellent appearance.      ASSESSMENT:  Status post right second metatarsal head excision with medial sesamoid excision and hardware removal.      PLAN:  I discussed with the patient and her daughter that she is making excellent progress.  She can proceed with activities as tolerated.  She will follow up with us on a p.r.n. basis.  The patient will have further followup up with Dr. Pack in order to maintain her regular foot care.       Again, thank you for allowing me to participate in the care of your patient.      Sincerely,    Alvaro Gautam MD

## 2018-11-06 NOTE — PROGRESS NOTES
CHIEF COMPLAINT:  Status post second metatarsal head excision with medial sesamoid and hardware removal performed 09/26/2018.      HISTORY OF PRESENT ILLNESS:  Ms. Herr presents today for further followup in the accompaniment of her daughter.  Denies to have any problems or complaints.  Reports to be compliant.      PHYSICAL EXAMINATION:  On today's visit, she presents with complete healing of all surgical incisions and absence of any ulcerations.  The foot presents an excellent appearance.      ASSESSMENT:  Status post right second metatarsal head excision with medial sesamoid excision and hardware removal.      PLAN:  I discussed with the patient and her daughter that she is making excellent progress.  She can proceed with activities as tolerated.  She will follow up with us on a p.r.n. basis.  The patient will have further followup up with Dr. Pack in order to maintain her regular foot care.

## 2018-11-06 NOTE — NURSING NOTE
"Reason For Visit: No chief complaint on file.      Ht 1.676 m (5' 6\")  Wt 86.4 kg (190 lb 6.4 oz)  BMI 30.73 kg/m2    Pain Assessment  Patient Currently in Pain: No  Patient's Stated Pain Goal: No pain  0-10 Pain Scale: 0  Primary Pain Location: Ankle  Pain Orientation: Right  Aggravating Factors: Movement    Yrn Zelaya [facility]    "

## 2018-11-06 NOTE — MR AVS SNAPSHOT
After Visit Summary   11/6/2018    Supriya Herr    MRN: [id number]           Patient Information     Date Of Birth          1950        Visit Information        Provider Department      11/6/2018 10:30 AM [facility]        Today's Diagnoses     Pain in joint, ankle and foot, right    -  1       Follow-ups after your visit        Your next 10 appointments already scheduled     Nov 08, 2018 11:30 AM CST   (Arrive by 11:15 AM)   RETURN DIABETES with Arabella Kamara PA-C   [facility] ([facility])    [address]  [address] [facility] [address] [phone]            Nov 08, 2018 12:30 PM CST   (Arrive by 12:15 PM)   Office Visit with Brenden Blackwell RPH   [facility] ([facility])    [address] [place] [address]  [address] [facility] [id number] [phone]           Bring a current list of meds and any records pertaining to this visit. For Physicals, please bring immunization records and any forms needing to be filled out. Please arrive 10 minutes early to complete paperwork.            Dec 07, 2018  1:40 PM CST   (Arrive by 1:25 PM)   RETURN FOOT/ANKLE with Eleazar Pack DPM   [facility] ([facility])    [address] [place] [address]  4th Floor  [facility] [address] [phone]            Dec 07, 2018  2:30 PM CST   Lab with  LAB   [facility] ([facility])    [address] [place] [address]  1st Floor  [facility] [id number] [phone]            Dec 07, 2018  3:30 PM CST   (Arrive by 3:00 PM)   Return Visit with Silva Guerrero NP   [facility] ([facility])    [address] [place] [address]  [address]  [facility] [id number] [phone]            Jan 22, 2019  3:20 PM CST   CT CHEST W/O CONTRAST with UCCT2   [facility]  ([facility])    [address]  1st Floor  [facility] [id number] [phone]           How do I prepare for my exam? (Food and drink instructions) No Food and Drink Restrictions.  How do I prepare for my exam? (Other instructions) You do not need to do anything special to prepare for this exam. For a sinus scan: Use your nose spray (nasal decongestant spray) as directed.  What should I wear: Please wear loose clothing, such as a sweat suit or jogging clothes. Avoid snaps, zippers and other metal. We may ask you to undress and put on a hospital gown.  How long does the exam take: Most scans take less than 20 minutes.  What should I bring: Please bring any scans or X-rays taken at other hospitals, if similar tests were done. Also bring a list of your medicines, including vitamins, minerals and over-the-counter drugs. It is safest to leave personal items at home.  Do I need a : No  is needed.  What do I need to tell my doctor? Be sure to tell your doctor: * If you have any allergies. * If there s any chance you are pregnant. * If you are breastfeeding.  What should I do after the exam: No restrictions, You may resume normal activities.  What is this test: A CT (computed tomography) scan is a series of pictures that allows us to look inside your body. The scanner creates images of the body in cross sections, much like slices of bread. This helps us see any problems more clearly.  Who should I call with questions: If you have any questions, please call the Imaging Department where you will have your exam. Directions, parking instructions, and other information is available on our website, "[email or web address][email or web address].            Jan 22, 2019  4:30 PM CST   (Arrive by 4:15 PM)   Return Visit with Ravin King MD   [facility] ([facility])    [address]  [address]  [facility] [id number] [phone]              Who to  "contact     Please call your clinic at [phone] to:    Ask questions about your health    Make or cancel appointments    Discuss your medicines    Learn about your test results    Speak to your doctor            Additional Information About Your Visit        [employer] Information     [employer] gives you secure access to your electronic health record. If you see a primary care provider, you can also send messages to your care team and make appointments. If you have questions, please call your primary care clinic.  If you do not have a primary care provider, please call [phone] and they will assist you.      [employer] is an electronic gateway that provides easy, online access to your medical records. With [employer], you can request a clinic appointment, read your test results, renew a prescription or communicate with your care team.     To access your existing account, please contact your [facility] or call [phone] for assistance.        Care EveryWhere ID     This is your Care EveryWhere ID. This could be used by other organizations to access your [place] medical records  [id number]        Your Vitals Were     Height BMI (Body Mass Index)                1.676 m (5' 6\") 30.73 kg/m2           Blood Pressure from Last 3 Encounters:   11/01/18 193/82   10/24/18 177/72   10/17/18 167/72    Weight from Last 3 Encounters:   11/06/18 86.4 kg (190 lb 6.4 oz)   11/01/18 86.6 kg (191 lb)   10/24/18 84.4 kg (186 lb)              Today, you had the following     No orders found for display         Today's Medication Changes          These changes are accurate as of 11/6/18 11:17 AM.  If you have any questions, ask your nurse or doctor.               These medicines have changed or have updated prescriptions.        Dose/Directions    BASAGLAR 100 UNIT/ML injection   This may have changed:  additional instructions   Used for:  Type 2 diabetes mellitus with diabetic neuropathy, with long-term " current use of insulin (H)        Dose:  20 Units   Inject 20 Units Subcutaneous At Bedtime Inject 22 U SubCut at HS   Quantity:  3 mL   Refills:  11       furosemide 40 MG tablet   Commonly known as:  LASIX   This may have changed:  additional instructions   Used for:  CKD (chronic kidney disease) stage 5, GFR less than 15 ml/min (H), Type 2 diabetes mellitus with stage 4 chronic kidney disease, with long-term current use of insulin (H), Anemia due to stage 5 chronic kidney disease, 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 (H)        Dose:  40 mg   Take 1 tablet (40 mg) by mouth 2 times daily   Quantity:  60 tablet   Refills:  11                Primary Care Provider Office Phone # Fax #    Noam Luis Jackson [phone]       [address] [address]        Equal Access to Services     [facility] : Emeterio Lam, wabo crocker, qaybta kaalmashell parisi, madeline sood . So [facility] [phone].    ATENCIÓN: Si habla español, tiene a santoro disposición servicios gratuitos de asistencia lingüística. Renuka al [phone].    We comply with applicable federal civil rights laws and [place] laws. We do not discriminate on the basis of race, color, national origin, age, disability, sex, sexual orientation, or gender identity.            Thank you!     Thank you for choosing [facility]  for your care. Our goal is always to provide you with excellent care. Hearing back from our patients is one way we can continue to improve our services. Please take a few minutes to complete the written survey that you may receive in the mail after your visit with us. Thank you!             Your Updated Medication List - Protect others around you: Learn how to safely use, store and throw away your medicines at www.disposemymeds.org.          This list is accurate as of 11/6/18 11:17 AM.  Always use your most recent med list.                   Brand Name  Dispense Instructions for use Diagnosis    acetaminophen 325 MG tablet    TYLENOL    100 tablet    Take 2 tablets (650 mg) by mouth every 4 hours as needed for mild pain    Diabetic ulcer of toe of right foot associated with type 2 diabetes mellitus, with other ulcer severity (H)       albuterol 108 (90 Base) MCG/ACT inhaler    PROAIR HFA/PROVENTIL HFA/VENTOLIN HFA    3 Inhaler    Inhale 2 puffs into the lungs every 6 hours as needed for shortness of breath / dyspnea or wheezing    Cough       atorvastatin 20 MG tablet    LIPITOR    90 tablet    TAKE 1 TABLET BY MOUTH ONCE DAILY    Hyperlipidemia LDL goal <100       BASAGLAR 100 UNIT/ML injection     3 mL    Inject 20 Units Subcutaneous At Bedtime Inject 22 U SubCut at HS    Type 2 diabetes mellitus with diabetic neuropathy, with long-term current use of insulin (H)       carvedilol 12.5 MG tablet    COREG    60 tablet    Take 1 tablet (12.5 mg) by mouth 2 times daily (with meals)    Essential hypertension with goal blood pressure less than 140/90       clindamycin 1 % topical gel    CLINDAMAX     Apply topically 2 times daily        emollient cream      Apply topically 2 times daily        famotidine 20 MG tablet    PEPCID    60 tablet    Take 1 tablet (20 mg) by mouth daily    Right foot pain       furosemide 40 MG tablet    LASIX    60 tablet    Take 1 tablet (40 mg) by mouth 2 times daily    CKD (chronic kidney disease) stage 5, GFR less than 15 ml/min (H), Type 2 diabetes mellitus with stage 4 chronic kidney disease, with long-term current use of insulin (H), Anemia due to stage 5 chronic kidney disease, 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 (H)       insulin pen needle 31G X 6 MM    ULTICARE MINI    100 each    Use daily or as directed.    Type 2 diabetes, HbA1c goal < 7% (H)       NovoLOG FLEXPEN 100 UNIT/ML injection   Generic drug:  insulin aspart     15 mL    4 Units Inject 4 units SQ with breakfast, lunch and dinner. IF SNACK BETWEEN LUNCH AND DINNER SHE TAKES AN EXTRA  2 UNITS    Type 2 diabetes mellitus with hyperglycemia, with long-term current use of insulin (H)       ONETOUCH ULTRA test strip   Generic drug:  blood glucose monitoring     400 strip    TEST YOUR BLOOD SUGAR 3-4 TIMES PER DAY.    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, subsequent encounter (H)       sertraline 25 MG tablet    ZOLOFT    30 tablet    TAKE 1 TABLET (25 MG) BY MOUTH DAILY    NICLOE (generalized anxiety disorder)       triamcinolone 0.1 % cream    KENALOG    30 g    Apply to sites of dermatitis- the abdomen, armpits, groin as needed.    Dermatitis       vitamin D 2000 units tablet     100 tablet    Take 2,000 Units by mouth daily    Vitamin D deficiency       * Notice:  This list has 3 medication(s) that are the same as other medications  prescribed for you. Read the directions carefully, and ask your doctor or other care provider to review them with you.

## 2018-11-08 ENCOUNTER — OFFICE VISIT (OUTPATIENT)
Dept: ENDOCRINOLOGY | Facility: CLINIC | Age: 68
End: 2018-11-08
Payer: MEDICARE

## 2018-11-08 ENCOUNTER — OFFICE VISIT (OUTPATIENT)
Dept: PHARMACY | Facility: CLINIC | Age: 68
End: 2018-11-08
Payer: MEDICARE

## 2018-11-08 VITALS — HEART RATE: 61 BPM | DIASTOLIC BLOOD PRESSURE: 71 MMHG | SYSTOLIC BLOOD PRESSURE: 185 MMHG

## 2018-11-08 DIAGNOSIS — I10 HYPERTENSION GOAL BP (BLOOD PRESSURE) < 140/90: ICD-10-CM

## 2018-11-08 DIAGNOSIS — Z79.4 TYPE 2 DIABETES MELLITUS WITH COMPLICATION, WITH LONG-TERM CURRENT USE OF INSULIN (H): Primary | ICD-10-CM

## 2018-11-08 DIAGNOSIS — K21.9 GASTROESOPHAGEAL REFLUX DISEASE WITHOUT ESOPHAGITIS: ICD-10-CM

## 2018-11-08 DIAGNOSIS — E11.65 TYPE 2 DIABETES MELLITUS WITH HYPERGLYCEMIA, WITH LONG-TERM CURRENT USE OF INSULIN (H): Primary | ICD-10-CM

## 2018-11-08 DIAGNOSIS — Z79.4 TYPE 2 DIABETES MELLITUS WITH HYPERGLYCEMIA, WITH LONG-TERM CURRENT USE OF INSULIN (H): Primary | ICD-10-CM

## 2018-11-08 DIAGNOSIS — E11.8 TYPE 2 DIABETES MELLITUS WITH COMPLICATION, WITH LONG-TERM CURRENT USE OF INSULIN (H): Primary | ICD-10-CM

## 2018-11-08 LAB — HBA1C MFR BLD: 5.4 % (ref 4.3–6)

## 2018-11-08 PROCEDURE — 99606 MTMS BY PHARM EST 15 MIN: CPT | Mod: GY | Performed by: PHARMACIST

## 2018-11-08 NOTE — PATIENT INSTRUCTIONS
Recommendations from today's MTM visit:                                                      1. I will talk with Dr. Basilio about starting another blood pressure medication.     2. Try taking Famotidine (Pepcid) for your chest discomfort at night when you lay down.     Next MTM visit: 12/7/18 at 2:30 AM    To schedule another MTM appointment, please call the clinic directly or you may call the MTM scheduling line at [phone] or toll-free at [phone].     My Clinical Pharmacist's contact information:                                                      It was a pleasure talking with you today!  Please feel free to contact me with any questions or concerns you have.      Brenden Blackwell, PharmD  MTM Pharmacist    Phone: [phone]     You may receive a survey about the MTM services you received.  I would appreciate your feedback to help me serve you better in the future. Please fill it out and return it when you can. Your comments will be anonymous.
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
## 2018-11-08 NOTE — MR AVS SNAPSHOT
After Visit Summary   11/8/2018    Supriya Herr    MRN: [id number]           Patient Information     Date Of Birth          1950        Visit Information        Provider Department      11/8/2018 12:30 PM Brenden Blackwell RPH [facility] Medication Therapy Management        Care Instructions    Recommendations from today's MTM visit:                                                      1. I will talk with Dr. Basilio about starting another blood pressure medication.     2. Try taking Famotidine (Pepcid) for your chest discomfort at night when you lay down.     Next MTM visit: 12/7/18 at 2:30 AM    To schedule another MTM appointment, please call the clinic directly or you may call the MTM scheduling line at [phone] or toll-free at [phone].     My Clinical Pharmacist's contact information:                                                      It was a pleasure talking with you today!  Please feel free to contact me with any questions or concerns you have.      Brenden Blackwell, Rani  MTM Pharmacist    Phone: [phone]     You may receive a survey about the MTM services you received.  I would appreciate your feedback to help me serve you better in the future. Please fill it out and return it when you can. Your comments will be anonymous.              Follow-ups after your visit        Your next 10 appointments already scheduled     Dec 07, 2018  1:40 PM CST   (Arrive by 1:25 PM)   RETURN FOOT/ANKLE with Eleazar Pack [facility] ([facility])    [address] [place] [address]  4th [facility] [address] [phone]            Dec 07, 2018  2:30 PM CST   Lab with [facility] ([facility])    [address] [place] [id number] [phone]            Dec 07, 2018  3:00 PM CST   (Arrive by 2:45 PM)   SHORT with DEEPALI Rivas [facility] Medication Therapy  Management ([facility])    [address]  3rd Floor  [facility] [address]   [phone]            Dec 07, 2018  3:30 PM CST   (Arrive by 3:00 PM)   Return Visit with Silva Guerrero NP   [facility] Neph[facility] ([facility])    [address] [facility] [address]  [address]  [facility] [address]   [phone]            Jan 22, 2019  3:20 PM CST   CT CHEST W/O CONTRAST with UCCT2   [facility] [place] [facility] ([facility])    [address]  1st Floor  [facility] [address]   [phone]           How do I prepare for my exam? (Food and drink instructions) No Food and Drink Restrictions.  How do I prepare for my exam? (Other instructions) You do not need to do anything special to prepare for this exam. For a sinus scan: Use your nose spray (nasal decongestant spray) as directed.  What should I wear: Please wear loose clothing, such as a sweat suit or jogging clothes. Avoid snaps, zippers and other metal. We may ask you to undress and put on a hospital gown.  How long does the exam take: Most scans take less than 20 minutes.  What should I bring: Please bring any scans or X-rays taken at other hospitals, if similar tests were done. Also bring a list of your medicines, including vitamins, minerals and over-the-counter drugs. It is safest to leave personal items at home.  Do I need a : No  is needed.  What do I need to tell my doctor? Be sure to tell your doctor: * If you have any allergies. * If there s any chance you are pregnant. * If you are breastfeeding.  What should I do after the exam: No restrictions, You may resume normal activities.  What is this test: A CT (computed tomography) scan is a series of pictures that allows us to look inside your body. The scanner creates images of the body in cross sections, much like slices of bread. This helps us see any problems more clearly.  Who should I call with questions:  If you have any questions, please call the Imaging Department where you will have your exam. Directions, parking instructions, and other information is available on our website, [email or web address].            Jan 22, 2019  4:30 PM CST   (Arrive by 4:15 PM)   Return Visit with Ravin King MD   [facility] ([facility])    [address]  [address]  [facility] [address] [phone]            Feb 08, 2019 11:00 AM CST   (Arrive by 10:45 AM)   RETURN DIABETES with Arabella Kamara PA-C   [facility] End[facility]inology ([facility])    [address]  3rd Floor  [facility] [address] [phone]              Who to contact     If you have questions or need follow up information about today's clinic visit or your schedule please contact [facility] MEDICATION THERAPY MANAGEMENT directly at [phone].  Normal or non-critical lab and imaging results will be communicated to you by [employer], letter or phone within 4 business days after the clinic has received the results. If you do not hear from us within 7 days, please contact the clinic through [employer] or phone. If you have a critical or abnormal lab result, we will notify you by phone as soon as possible.  Submit refill requests through [employer] or call your pharmacy and they will forward the refill request to us. Please allow 3 business days for your refill to be completed.          Additional Information About Your Visit        [employer] Information     [employer] gives you secure access to your electronic health record. If you see a primary care provider, you can also send messages to your care team and make appointments. If you have questions, please call your primary care clinic.  If you do not have a primary care provider, please call [phone] and they will assist you.        Care EveryWhere ID     This is your Care EveryWhere ID. This could be used by other organizations  to access your [facility] medical records  [id number]         Blood Pressure from Last 3 Encounters:   11/08/18 185/71   11/01/18 193/82   10/24/18 177/72    Weight from Last 3 Encounters:   11/06/18 190 lb 6.4 oz (86.4 kg)   11/01/18 191 lb (86.6 kg)   10/24/18 186 lb (84.4 kg)              Today, you had the following     No orders found for display         Today's Medication Changes          These changes are accurate as of 11/8/18 12:50 PM.  If you have any questions, ask your nurse or doctor.               These medicines have changed or have updated prescriptions.        Dose/Directions    BASAGLAR 100 UNIT/ML injection   This may have changed:  additional instructions   Used for:  Type 2 diabetes mellitus with diabetic neuropathy, with long-term current use of insulin (H)        Dose:  20 Units   Inject 20 Units Subcutaneous At Bedtime Inject 22 U SubCut at HS   Quantity:  3 mL   Refills:  11       furosemide 40 MG tablet   Commonly known as:  LASIX   This may have changed:  additional instructions   Used for:  CKD (chronic kidney disease) stage 5, GFR less than 15 ml/min (H), Type 2 diabetes mellitus with stage 4 chronic kidney disease, with long-term current use of insulin (H), Anemia due to stage 5 chronic kidney disease, 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 (H)        Dose:  40 mg   Take 1 tablet (40 mg) by mouth 2 times daily   Quantity:  60 tablet   Refills:  11                Primary Care Provider Office Phone # Fax #    Noam Luis Jackson [phone]       [address] [address]        Equal Access to Services     KALYANI WARREN AH: Emeterio lauren Soaleksandar, waaxda luqadaha, qaybta kaalmada ailin, madeline chaudhari. So [facility] [phone].    ATENCIÓN: Si habla español, tiene a santoro disposición servicios gratuitos de asistencia lingüística. Llame al [phone].    We comply with applicable federal civil rights laws and [place] laws. We do  not discriminate on the basis of race, color, national origin, age, disability, sex, sexual orientation, or gender identity.            Thank you!     Thank you for choosing [facility] MEDICATION THERAPY MANAGEMENT  for your care. Our goal is always to provide you with excellent care. Hearing back from our patients is one way we can continue to improve our services. Please take a few minutes to complete the written survey that you may receive in the mail after your visit with us. Thank you!             Your Updated Medication List - Protect others around you: Learn how to safely use, store and throw away your medicines at www.disposemymeds.org.          This list is accurate as of 11/8/18 12:50 PM.  Always use your most recent med list.                   Brand Name Dispense Instructions for use Diagnosis    acetaminophen 325 MG tablet    TYLENOL    100 tablet    Take 2 tablets (650 mg) by mouth every 4 hours as needed for mild pain    Diabetic ulcer of toe of right foot associated with type 2 diabetes mellitus, with other ulcer severity (H)       albuterol 108 (90 Base) MCG/ACT inhaler    PROAIR HFA/PROVENTIL HFA/VENTOLIN HFA    3 Inhaler    Inhale 2 puffs into the lungs every 6 hours as needed for shortness of breath / dyspnea or wheezing    Cough       atorvastatin 20 MG tablet    LIPITOR    90 tablet    TAKE 1 TABLET BY MOUTH ONCE DAILY    Hyperlipidemia LDL goal <100       BASAGLAR 100 UNIT/ML injection     3 mL    Inject 20 Units Subcutaneous At Bedtime Inject 22 U SubCut at HS    Type 2 diabetes mellitus with diabetic neuropathy, with long-term current use of insulin (H)       carvedilol 12.5 MG tablet    COREG    60 tablet    Take 1 tablet (12.5 mg) by mouth 2 times daily (with meals)    Essential hypertension with goal blood pressure less than 140/90       clindamycin 1 % topical gel    CLINDAMAX     Apply topically 2 times daily        emollient cream      Apply topically 2 times daily        famotidine 20 MG  tablet    PEPCID    60 tablet    Take 1 tablet (20 mg) by mouth daily    Right foot pain       furosemide 40 MG tablet    LASIX    60 tablet    Take 1 tablet (40 mg) by mouth 2 times daily    CKD (chronic kidney disease) stage 5, GFR less than 15 ml/min (H), Type 2 diabetes mellitus with stage 4 chronic kidney disease, with long-term current use of insulin (H), Anemia due to stage 5 chronic kidney disease, 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 (H)       insulin pen needle 31G X 6 MM    ULTICARE MINI    100 each    Use daily or as directed.    Type 2 diabetes, HbA1c goal < 7% (H)       NovoLOG FLEXPEN 100 UNIT/ML injection   Generic drug:  insulin aspart     15 mL    4 Units Inject 4 units SQ with breakfast, lunch and dinner. IF SNACK BETWEEN LUNCH AND DINNER SHE TAKES AN EXTRA 2 UNITS    Type 2 diabetes mellitus with hyperglycemia, with long-term current use of insulin (H)       ONETOUCH ULTRA test strip   Generic drug:  blood glucose monitoring     400 strip    TEST YOUR BLOOD SUGAR 3-4 TIMES PER DAY.    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    Edema of lower  extremity       order for DME     1 Device    1 SAD light    Seasonal affective disorder (H)       order for DME     1 Device    Equipment being ordered: toilet riser, lifetime need DX amputation of leg above the knee, S78.119    Traumatic amputation of right lower extremity above knee, subsequent encounter (H)       sertraline 25 MG tablet    ZOLOFT    30 tablet    TAKE 1 TABLET (25 MG) BY MOUTH DAILY    NICOLE (generalized anxiety disorder)       triamcinolone 0.1 % cream    KENALOG    30 g    Apply to sites of dermatitis- the abdomen, armpits, groin as needed.    Dermatitis       vitamin D 2000 units tablet     100 tablet    Take 2,000 Units by mouth daily    Vitamin D deficiency       * Notice:  This list has 3 medication(s) that are the same as other medications prescribed for you. Read the directions carefully, and ask your doctor or other care provider to review them with you.
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
## 2018-11-08 NOTE — MR AVS SNAPSHOT
After Visit Summary   11/8/2018    Supriya Herr    MRN: [id number]           Patient Information     Date Of Birth          1950        Visit Information        Provider Department      11/8/2018 11:30 AM Arabella Kamara PA-C [facility]        Today's Diagnoses     Type 2 diabetes mellitus (H)    -  1    Type 2 diabetes mellitus with hyperglycemia, with long-term current use of insulin (H)          Care Instructions    Cardiology number - [phone]          Follow-ups after your visit        Additional Services     CARDIOLOGY EVAL ADULT REFERRAL       Preferred location:  PREFERRED PROVIDERS:  Evaluate for chest pain 2-3 times per week. Pt has type 2 diabetes mellitus and past smoking hx.      Please be aware that coverage of these services is subject to the terms and limitations of your health insurance plan.  Call member services at your health plan with any benefit or coverage questions.      Please bring the following to your appointment:  Any x-rays, CTs or MRIs which have been performed. Contact the facility where they were done to arrange for  prior to your scheduled appointment.    List of current medications  This referral request   Any documents/labs given to you for this referral                  Your next 10 appointments already scheduled     Nov 08, 2018 12:30 PM CST   (Arrive by 12:15 PM)   [facility] ([facility]    [address] [place] [address] [phone]           Bring a current list of meds and any records pertaining to this visit. For Physicals, please bring immunization records and any forms needing to be filled out. Please arrive 10 minutes early to complete paperwork.            Dec 07, 2018  1:40 PM CST   (Arrive by 1:25 PM)   RETURN FOOT/ANKLE with Eleazar Pack DPM   [facility] (  [facility])    [address]  4th Floor  [facility] [address]   [phone]            Dec 07, 2018  2:30 PM CST   Lab with JENNIFER LAB   [facility] ([facility])    [address]  1st Floor  [facility] [address]   [phone]            Dec 07, 2018  3:30 PM CST   (Arrive by 3:00 PM)   Return Visit with Silva Guerrero NP   [facility] Nephrology ([facility])    [address] [place] [address]  [address]  [facility] [address]   [phone]            Jan 22, 2019  3:20 PM CST   CT CHEST W/O CONTRAST with UCCT2   [facility] CT ([facility])    [address] [place] [address]  [address] [facility] [id number] [phone]           How do I prepare for my exam? (Food and drink instructions) No Food and Drink Restrictions.  How do I prepare for my exam? (Other instructions) You do not need to do anything special to prepare for this exam. For a sinus scan: Use your nose spray (nasal decongestant spray) as directed.  What should I wear: Please wear loose clothing, such as a sweat suit or jogging clothes. Avoid snaps, zippers and other metal. We may ask you to undress and put on a hospital gown.  How long does the exam take: Most scans take less than 20 minutes.  What should I bring: Please bring any scans or X-rays taken at other hospitals, if similar tests were done. Also bring a list of your medicines, including vitamins, minerals and over-the-counter drugs. It is safest to leave personal items at home.  Do I need a : No  is needed.  What do I need to tell my doctor? Be sure to tell your doctor: * If you have any allergies. * If there s any chance you are pregnant. * If you are breastfeeding.  What should I do after the exam: No restrictions, You may resume normal activities.  What is this test: A CT (computed tomography) scan is a series of pictures that allows us to look inside your  body. The scanner creates images of the body in cross sections, much like slices of bread. This helps us see any problems more clearly.  Who should I call with questions: If you have any questions, please call the Imaging Department where you will have your exam. Directions, parking instructions, and other information is available on our website, [email or web address].            Jan 22, 2019  4:30 PM CST   (Arrive by 4:15 PM)   Return Visit with Ravin King MD   [facility] ([facility])    [address]  [address]  [facility] [id number] [phone]            Feb 08, 2019 11:00 AM CST   (Arrive by 10:45 AM)   RETURN DIABETES with Arabella Kamara PA-C   [facility] ([facility])    [address] [place] [address]  3rd Floor  [facility] [address] [phone]              Who to contact     Please call your clinic at [phone] to:    Ask questions about your health    Make or cancel appointments    Discuss your medicines    Learn about your test results    Speak to your doctor            Additional Information About Your Visit        [employer] Information     [employer] gives you secure access to your electronic health record. If you see a primary care provider, you can also send messages to your care team and make appointments. If you have questions, please call your primary care clinic.  If you do not have a primary care provider, please call [phone] and they will assist you.      [employer] is an electronic gateway that provides easy, online access to your medical records. With [employer], you can request a clinic appointment, read your test results, renew a prescription or communicate with your care team.     To access your existing account, please contact your [facility] or call [phone] for assistance.        Care EveryWhere ID     This is your Care EveryWhere ID. This could be used  by other organizations to access your [facility] medical records  [id number]        Your Vitals Were     Pulse                   61            Blood Pressure from Last 3 Encounters:   11/08/18 185/71   11/01/18 193/82   10/24/18 177/72    Weight from Last 3 Encounters:   11/06/18 86.4 kg (190 lb 6.4 oz)   11/01/18 86.6 kg (191 lb)   10/24/18 84.4 kg (186 lb)              We Performed the Following     CARDIOLOGY EVAL ADULT REFERRAL     Hemoglobin A1c POCT          Today's Medication Changes          These changes are accurate as of 11/8/18 12:20 PM.  If you have any questions, ask your nurse or doctor.               These medicines have changed or have updated prescriptions.        Dose/Directions    BASAGLAR 100 UNIT/ML injection   This may have changed:  additional instructions   Used for:  Type 2 diabetes mellitus with diabetic neuropathy, with long-term current use of insulin (H)        Dose:  20 Units   Inject 20 Units Subcutaneous At Bedtime Inject 22 U SubCut at HS   Quantity:  3 mL   Refills:  11       furosemide 40 MG tablet   Commonly known as:  LASIX   This may have changed:  additional instructions   Used for:  CKD (chronic kidney disease) stage 5, GFR less than 15 ml/min (H), Type 2 diabetes mellitus with stage 4 chronic kidney disease, with long-term current use of insulin (H), Anemia due to stage 5 chronic kidney disease, 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 (H)        Dose:  40 mg   Take 1 tablet (40 mg) by mouth 2 times daily   Quantity:  60 tablet   Refills:  11                Primary Care Provider Office Phone # Fax #    Noam Luis Jackson [phone]       [address] [address]        Equal Access to Services     [facility]: Hadii danisha Lam, waaxda luqadaha, qaybta kaalmada ailin, madeline chaudhari. [facility] [phone].    ATENCIÓN: Si habla español, tiene a santoro disposición servicios gratuitos de asistencia lingüística.  Renuka chua [phone].    We comply with applicable federal civil rights laws and [place] laws. We do not discriminate on the basis of race, color, national origin, age, disability, sex, sexual orientation, or gender identity.            Thank you!     Thank you for choosing [facility] ENDOCRINOLOG[facility]  for your care. Our goal is always to provide you with excellent care. Hearing back from our patients is one way we can continue to improve our services. Please take a few minutes to complete the written survey that you may receive in the mail after your visit with us. Thank you!             Your Updated Medication List - Protect others around you: Learn how to safely use, store and throw away your medicines at www.disposemymeds.org.          This list is accurate as of 11/8/18 12:20 PM.  Always use your most recent med list.                   Brand Name Dispense Instructions for use Diagnosis    acetaminophen 325 MG tablet    TYLENOL    100 tablet    Take 2 tablets (650 mg) by mouth every 4 hours as needed for mild pain    Diabetic ulcer of toe of right foot associated with type 2 diabetes mellitus, with other ulcer severity (H)       albuterol 108 (90 Base) MCG/ACT inhaler    PROAIR HFA/PROVENTIL HFA/VENTOLIN HFA    3 Inhaler    Inhale 2 puffs into the lungs every 6 hours as needed for shortness of breath / dyspnea or wheezing    Cough       atorvastatin 20 MG tablet    LIPITOR    90 tablet    TAKE 1 TABLET BY MOUTH ONCE DAILY    Hyperlipidemia LDL goal <100       BASAGLAR 100 UNIT/ML injection     3 mL    Inject 20 Units Subcutaneous At Bedtime Inject 22 U SubCut at HS    Type 2 diabetes mellitus with diabetic neuropathy, with long-term current use of insulin (H)       carvedilol 12.5 MG tablet    COREG    60 tablet    Take 1 tablet (12.5 mg) by mouth 2 times daily (with meals)    Essential hypertension with goal blood pressure less than 140/90       clindamycin 1 % topical gel    CLINDAMAX     Apply topically 2 times  daily        emollient cream      Apply topically 2 times daily        famotidine 20 MG tablet    PEPCID    60 tablet    Take 1 tablet (20 mg) by mouth daily    Right foot pain       furosemide 40 MG tablet    LASIX    60 tablet    Take 1 tablet (40 mg) by mouth 2 times daily    CKD (chronic kidney disease) stage 5, GFR less than 15 ml/min (H), Type 2 diabetes mellitus with stage 4 chronic kidney disease, with long-term current use of insulin (H), Anemia due to stage 5 chronic kidney disease, 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 (H)       insulin pen needle 31G X 6 MM    ULTICARE MINI    100 each    Use daily or as directed.    Type 2 diabetes, HbA1c goal < 7% (H)       NovoLOG FLEXPEN 100 UNIT/ML injection   Generic drug:  insulin aspart     15 mL    4 Units Inject 4 units SQ with breakfast, lunch and dinner. IF SNACK BETWEEN LUNCH AND DINNER SHE TAKES AN EXTRA 2 UNITS    Type 2 diabetes mellitus with hyperglycemia, with long-term current use of insulin (H)       ONETOUCH ULTRA test strip   Generic drug:  blood glucose monitoring     400 strip    TEST YOUR BLOOD SUGAR 3-4 TIMES PER DAY.    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    Dermatitis       vitamin D 2000 units tablet     100 tablet    Take 2,000 Units by mouth daily    Vitamin D deficiency       * Notice:  This list has 3 medication(s) that are the same as other medications prescribed for you. Read the directions carefully, and ask your doctor or other care provider to review them with you.

## 2018-11-08 NOTE — NURSING NOTE
Chief Complaint   Patient presents with     RECHECK     DM2     Capillary puncture performed for Hemoglobin A1C test. Patient tolerated well.    Karlene Estes MA

## 2018-11-08 NOTE — PROGRESS NOTES
SUBJECTIVE/OBJECTIVE:                Supriya Herr is a 68 year old female coming in for a follow up appointment.  She as referred from Nephrology.     Chief Complaint: Follow up from last visit: pt's BP has been poorly controlled at home    Allergies/ADRs: None  Tobacco: 0-1 pack per day - is not interested in quitting sneaks in a cigarette in here and there, but unable to smoke where she is staying.   Alcohol: not currently using  PMH: Reviewed in Epic    Medication Adherence/Access:  Patient uses pill box(es) and has assistance with medication administration: family member, daughter.  Patient takes medications 2 time(s) per day.    Hypertension: Current medications include Carvedilol 12.5mg BID, Furosemide 40mg qAM and 20mg qPM(does not weigh at home due to being in wheel chair), improved edema in leg, possibly due to infection.  Patient reports no current medication side effects. We discussed   Home BPs: 160-208s/80s    Diabetes: Pt currently taking Lantus 22 units daily, Novolog 4 units with meals, 2 units before snacks. Pt is not experiencing side effects. Pt was recently admitted in   SMBG: three times daily.   Ranges (BGM):  Past week:  AM: 100-130   Lunch: 150s mostly (max 300)  Dinner: 177-200 (before and after meals; max 355)  Recent symptoms of high blood sugar? none  Eye exam: due. Last visit was 05/2017  Foot exam: up to date  Microalbumin is not < 30 mg/g. Pt is not taking an ACEi/ARB.  Aspirin: pt not taking  Diet/Exercise: Discussed with patient and daughter. Pt does have a penchant for sweets and snacking between meals. This may be contributing to her high dinner numbers.  Lab Results   Component Value Date    A1C 6.0 06/22/2018    A1C 5.4 03/29/2018    A1C 6.8 01/09/2018    A1C 9.6 06/09/2017    A1C 6.9 02/14/2017       GERD: Current medications include: Pepcid (famotidine) 20mg prn. Pt c/o heartburn three times per week, unclear cause they thing it may be heartburn. Pt has not been taking her  Famotidine.     Current labs include:   BP Readings from Last 3 Encounters:   11/08/18 185/71   11/01/18 193/82   10/24/18 177/72     ASSESSMENT:                 Current medications were reviewed today.      Medication Adherence: good, no issues identified    Hypertension: Needs improvement. BP not at goal in clinic or at home. Discussed in the past starting an low dose ARB with close monitoring.     Diabetes: Stable. FBG at goal , majority of other BG <200. Due for A1c in December.     GERD: Needs improvement. Pt instructed to start Famotidine.     PLAN:                Pt to...  1. Start Famotidine for evening heartburn.    Dr. Basilio, [facility],  1. Pt's BP has been running between 160-200, we have discussed trialing an ARB in the past with close lab follow up. Considering current BP, may be a good time. Ethel will be following up 12/7.     I spent 15 minutes with this patient today. I offer these suggestions for consideration by the PCP. A copy of the visit note was provided to the patient's primary care provider.    Will follow up in 1 month.    The patient was given a summary of these recommendations as an after visit summary.    Brenden Blackwell, PharmD  [facility] Pharmacist    Phone: [phone]

## 2018-11-09 ENCOUNTER — DOCUMENTATION ONLY (OUTPATIENT)
Dept: CARE COORDINATION | Facility: CLINIC | Age: 68
End: 2018-11-09

## 2018-11-10 NOTE — PROGRESS NOTES
[facility] now requests orders and shares plan of care/discharge summaries for some patients through [employer].  Please REPLY TO THIS MESSAGE OR ROUTE BACK TO THE AUTHOR in order to give authorization for orders when needed.  This is considered a verbal order, you will still receive a faxed copy of orders for signature.  Thank you for your assistance in improving collaboration for our patients.    ORDER    OT 1w3 cognition and DME for pressure relief    The plan will also include falls prevention plan, monitor and treat pain, monitor skin integrity, monitor for s/s of depression, diabetic foot care, monitor for symptoms of heart failure and to achieve the following goals.    With in three weeks  Pt will participate in cognitive evaluation to assist in determining pts current level of safety and function with ADLs/IADLs, such as shopping.     Pt/caregivers will be instructed in home safety/service recommendations based on cognitive testing    Pt to participate in wheelchair seating/positioning evaluation to improve posture/promote wound healing/prevent further skin breakdown.

## 2018-11-12 ENCOUNTER — TELEPHONE (OUTPATIENT)
Dept: FAMILY MEDICINE | Facility: CLINIC | Age: 68
End: 2018-11-12

## 2018-11-12 NOTE — TELEPHONE ENCOUNTER
Verbal ok for skilled home care PT orders given per secure vm to Sara Sprague RN   [facility]

## 2018-11-12 NOTE — PROGRESS NOTES
Kathryn Basilio MD Griffin, Peter Alberto, [facility]                     I am ok with this but I'd like to titrate up furosemide first (she was only on 20mg bid, I will increase to 40/20 then 40/40 and she may need more), but K has been great so ACE/ARB is fine thereafter   thanks            Previous Messages       ----- Message -----      From: Brenden Blackwell, [facility]      Sent: 11/8/2018   1:21 PM        To: Silva Guerrero, KEIRA, *     Hello Dr. Basilio, Silva Guerrero,     Patient's BP has been quite high at home recently (160-200s/80), also elevated in clinic. It may be time to trial an ARB with close lab follow up. Silva will be following up with her on 12/7. Let me know what you think.     See labs below:       BP Readings from Last 3 Encounters:   11/08/18 : 185/71   11/01/18 : 193/82   10/24/18 : 177/72     Potassium        Date                     Value               Ref Range           Status                11/01/2018               3.8                 3.4 - 5.3 mmol*     Final             ----------   Creatinine        Date                     Value               Ref Range           Status                11/01/2018               3.17 (H)            0.52 - 1.04 mg*     Final             ----------          Will wait until Dr. Basilio increases Furosemide before starting Losartan.     Brenden Blackwell, PharmD  [facility] Pharmacist    Phone: [phone]

## 2018-11-12 NOTE — TELEPHONE ENCOUNTER
Reason for call:  Order   Order or referral being requested: Physical therapy orders  Reason for request: NA  Date needed: as soon as possible  Has the patient been seen by the PCP for this problem? YES    Additional comments: Sara dunlap physical therapist called to get verbal orders from Dr. Jackson for physical therapy one time a week for three weeks to work on therapeutic exercise, standing drills, transfer training and to set up a home exercise program.    Phone number to reach patient:  Other phone number: [phone]    Best Time: Any    Can we leave a detailed message on this number?  YES

## 2018-11-14 ENCOUNTER — DOCUMENTATION ONLY (OUTPATIENT)
Dept: CARE COORDINATION | Facility: CLINIC | Age: 68
End: 2018-11-14

## 2018-11-15 ENCOUNTER — OFFICE VISIT (OUTPATIENT)
Dept: CARDIOLOGY | Facility: CLINIC | Age: 68
End: 2018-11-15
Payer: MEDICARE

## 2018-11-15 VITALS — OXYGEN SATURATION: 99 % | SYSTOLIC BLOOD PRESSURE: 160 MMHG | DIASTOLIC BLOOD PRESSURE: 64 MMHG | HEART RATE: 61 BPM

## 2018-11-15 DIAGNOSIS — I10 ESSENTIAL HYPERTENSION: ICD-10-CM

## 2018-11-15 DIAGNOSIS — R07.89 ATYPICAL CHEST PAIN: Primary | ICD-10-CM

## 2018-11-15 DIAGNOSIS — N18.5 CKD (CHRONIC KIDNEY DISEASE) STAGE 5, GFR LESS THAN 15 ML/MIN (H): ICD-10-CM

## 2018-11-15 PROCEDURE — 99214 OFFICE O/P EST MOD 30 MIN: CPT | Performed by: INTERNAL MEDICINE

## 2018-11-15 NOTE — PROGRESS NOTES
I am delighted to see Supriya Herr in consultation for chest pain. She is accompanied by her daughter with whom she lives and is her main caretaker.    History of Present Illness:  As you know, the patient is a 68 year old  Female who saw my colleague Dr. Milton Guadarrama on 4/23/18 as part of renal transplant evaluation. She has a h/o diabetes, hypertension, and chronic kidney disease. Evaluation at the time included normal echocardiogram and normal lexiscan nuclear stress test. Aspirin initiation was recommended, if safe from renal standpoint, as well as aggressive management of BP.    She had been doing well with stable BP control and reportedly was listed for transplant. This summer developed a foot infection which made her inactive for transplant. She underwent surgery, then went to TCU. In TCU she was not getting her usual meds and BP readings were high. She has been experiencing intermittent chest pain since she returned home from TCU - she describes symptoms as high mid chest pressure, occurs in the evenings, frequently when she's stressed. She takes a couple of deep breaths and it goes away. Inhalers help also. She has symptoms a few times a week, does not seem to be worse. BP has been high at home. Lasix dose just increased yesterday.    She denies syncope, palpitations. 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 She sleeps in a hospital bed with head of bed elevated about 90 degrees - has been doing this for several years.     The following portions of the patient's history were reviewed and updated as appropriate: allergies, current medications, past family history, past medical history, past social history, past surgical history, and the problem list.    Past Medical History:  Hypertension  Diabetes type II  Chronic kidney disease, no on dialysis. Daughter potential donor.  Anemia  Anxiety/depression  Hyperlipidemia  Traumatic amputation of left leg  Former smoker but still  smokes a few cigarettes    Medications:   Atorvastatin 20 every day  Carvedilol 12.5 bid  Lasix 60 qam/40 qpm (increased yesterday from 40/20)  Insulin  Zoloft  Vitamin D      Allergies:    Allergies   Allergen Reactions     Penicillins Rash     Unasyn Rash     No evidence SJS, but very uncomfortable and precipitated multiple provider visits. Would not use penicillins again if other options available.          Family History:   Family History   Problem Relation Age of Onset     Diabetes Mother      Hypertension Mother      HEART DISEASE Mother       of congestive heart failure     Eye Disorder Mother      Arthritis Mother      Obesity Mother      HEART DISEASE Father       from CHF     Cerebrovascular Disease Father      Arthritis Father      Musculoskeletal Disorder Other      has MS     Thyroid Disease Other      Eye Disorder Other      cataracts     Cancer Other      throat/liver     Skin Cancer No family hx of      Melanoma No family hx of      Glaucoma No family hx of      Macular Degeneration No family hx of        Psychosocial history:  reports that she has been smoking Cigarettes.  She started smoking about 54 years ago. She has a 26.00 pack-year smoking history. She has never used smokeless tobacco. She reports that she does not drink alcohol or use illicit drugs.    Review of systems:   Cardiovascular: No palpitations, chest pain, shortness of breath at rest, dyspnea with exertion, orthopnea, paroxysmal nocturia dyspnea, nocturia, dizziness, syncope.    In addition,   Constitutional: No change in weight, sleep or appetite.  Normal energy.  No fever or chills  Eyes: Negative for vision changes or eye problems  ENT: No problems with ears, nose or throat.  No difficulty swallowing.  Resp: No coughing, wheezing or shortness of breath  GI: No nausea, vomiting,  heartburn, abdominal pain, diarrhea, constipation or change in bowel habits  : No urinary frequency or dysuria, bladder or kidney  problems  Musculoskeletal: No significant muscle or joint pains  Neurologic: No headaches, numbness, tingling, weakness, problems with balance or coordination  Psychiatric: No problems with anxiety, depression or mental health  Heme/immune/allergy: No history of bleeding or clotting problems or anemia.  No allergies or immune system problems  Integumentary: No rashes,worrisome lesions or skin problems      Physical examination  Vitals: 190/64, HR 61  BMI= 30    Constitutional: In general, the patient is a pleasant female in no apparent distress. Grayish hue.  Eyes: PERRLA.  EOMI.  Sclerae white, not injected.  ENT/mouth: Normiocephalic and atraumatic.  Nares clear.  Pharynx without erythema or exudate.  Dentition intact.  No adenopathy.  No thyromegaly. Carotids +2/2 bilaterally without bruits.  No jugular venous distension.   Card/Vasc: The PMI is in the 5th ICS in the midclavicular line. There is no heave. Regular rate and rhythm. Normal S1, S2. No murmur, rub, click, or gallop. Pulses are normal bilaterally throughout. 1-2+ right peripheral edema with venous stasis changes.  Respiratory: Clear to asculation.  No ronchi, wheezes, rales.  No dullness to percussion.   GI: Abdomen is soft, nontender, nondistended. No organomegaly. No AAA.  No bruits.   Integument: No significant bruises or rashes  Neurological: The neurological examination reveal a patient who was oriented to person, place, and time.    Psych: Normal  Heme/Lymph/Immun: no significant adenopathy  Left BKA    I have reviewed the following labs/imaging:  Nuclear stress test 5/22/18: normal EF 58%, no ischemia  Echo 3/29/18: EF 60-65%, no valve disease. LVH noted.  Labs: 3/29/18: cholesterol 179, HDL 45, ,   11/1/18: K 3.8, cr 3.17, hgb 9  A1C 6/22/18 was 6.0    I have personally and independently reviewed the following:  EKG 3/29/18: normal sinus, normal intervals    Assessment :  Chest pressure. Noncardiac by description. Normal stress  test a few months ago. Suggest ongoing aggressive control of BP - reportedly well controlled in the summer but may have been perturbed by recent foot surgery, medicine changes in TCU, ongoing right knee pain.    No repeat stress testing necessary.   Would not pursue coronary angiogram given renal dysfunction.  No cardiovascular issues to proceed with transplant.    Plan:  Continue BP management.    I spent a total of 30 minutes face to face with  Supriya Herr during today's office visit. Over 50% of this time was spent counseling the patient and/or coordinating care regarding BP management strategies.      The patient is to return as needed. The patient understood the treatment plan as outlined above.  There were no barriers to learning.      Orly Dumont MD

## 2018-11-15 NOTE — PATIENT INSTRUCTIONS
"You were seen today in the Cardiovascular Clinic at [facility].     Cardiology Providers you saw during your visit: Dr. Orly Dumont    Diagnosis:  Chest discomfort, hypertension    Results: discussed with patient      Orders:   none    Medication Changes:   none    Recommendations:   As above    Follow-up:  As needed       Please feel free to call me with any questions or concerns.        Questions and [phone].   First press #1 for the [employer] and then press #3 for \"Medical Questions\" to reach us Cardiology Nurses.      On Call Cardiologist for after hours or on weekends: [phone]   option #4 and ask to speak to the on-call Cardiologist.          If you need a medication refill please contact your pharmacy.  Please allow 3 business days for your refill to be completed.    "

## 2018-11-15 NOTE — MR AVS SNAPSHOT
"              After Visit Summary   11/15/2018    Supriya Herr    MRN: [id number]           Patient Information     Date Of Birth          1950        Visit Information        Provider Department      11/15/2018 4:30 PM Orly Dumont MD [facility]        Care Instructions    You were seen today in the Cardiovascular Clinic at [facility].     Cardiology Providers you saw during your visit: Dr. Orly Dumont    Diagnosis:  Chest discomfort, hypertension    Results: discussed with patient      Orders:   none    Medication Changes:   none    Recommendations:   As above    Follow-up:  As needed       Please feel free to call me with any questions or concerns.        Questions and [phone].   First press #1 for the [employer] and then press #3 for \"Medical Questions\" to reach us Cardiology Nurses.      On Call Cardiologist for after hours or on weekends: [phone]   option #4 and ask to speak to the on-call Cardiologist.          If you need a medication refill please contact your pharmacy.  Please allow 3 business days for your refill to be completed.            Follow-ups after your visit        Your next 10 appointments already scheduled     2018 11:00 AM CST   Office Visit with Noam [place]   [facility] [place]    [address] [place] [address] [phone]           Bring a current list of meds and any records pertaining to this visit. For Physicals, please bring immunization records and any forms needing to be filled out. Please arrive 10 minutes early to complete paperwork.            Dec 07, 2018  1:40 PM CST   (Arrive by 1:25 PM)   RETURN FOOT/ANKLE with Eleazar Pack DPM   [facility] ([facility])    [address]  4th Floor  [facility] [address] [phone]            Dec 07, 2018 "  2:30 PM CST   Lab with  LAB   [facility] ([facility])    [address]  1st Floor  [place] [address]   [phone]            Dec 07, 2018  3:00 PM CST   (Arrive by 2:45 PM)   SHORT with Brenden Blackwell RPH   [facility] ([facility])    [address]  3rd Floor  [place] [address]   [phone]            Dec 07, 2018  3:30 PM CST   (Arrive by 3:00 PM)   Return Visit with Silva Guerrero NP   [facility] ([facility])    [address]  [address]  [place] [address]   [phone]            Jan 22, 2019  3:20 PM CST   CT CHEST W/O CONTRAST with UCCT2   [facility] [place] [facility] ([facility])    [address]  1st Floor  [facility] [id number] [phone]           How do I prepare for my exam? (Food and drink instructions) No Food and Drink Restrictions.  How do I prepare for my exam? (Other instructions) You do not need to do anything special to prepare for this exam. For a sinus scan: Use your nose spray (nasal decongestant spray) as directed.  What should I wear: Please wear loose clothing, such as a sweat suit or jogging clothes. Avoid snaps, zippers and other metal. We may ask you to undress and put on a hospital gown.  How long does the exam take: Most scans take less than 20 minutes.  What should I bring: Please bring any scans or X-rays taken at other hospitals, if similar tests were done. Also bring a list of your medicines, including vitamins, minerals and over-the-counter drugs. It is safest to leave personal items at home.  Do I need a : No  is needed.  What do I need to tell my doctor? Be sure to tell your doctor: * If you have any allergies. * If there s any chance you are pregnant. * If you are breastfeeding.  What should I do after the exam: No restrictions, You may resume normal  activities.  What is this test: A CT (computed tomography) scan is a series of pictures that allows us to look inside your body. The scanner creates images of the body in cross sections, much like slices of bread. This helps us see any problems more clearly.  Who should I call with questions: If you have any questions, please call the Imaging Department where you will have your exam. Directions, parking instructions, and other information is available on our website, [employer]/imaging.            Jan 22, 2019  4:30 PM CST   (Arrive by 4:15 PM)   Return Visit with Ravin King MD   [facility] ([facility])    [address]  [address]  [place] [facility] [place] [id number] [phone]            Feb 08, 2019 11:00 AM CST   (Arrive by 10:45 AM)   RETURN DIABETES with Arabella Kamara PA-C   [facility] ([facility])    [address]  3rd Floor  [facility] [facility] [place] [id number] [phone]              Who to contact     If you have questions or need follow up information about today's clinic visit or your schedule please contact [facility] directly at [phone].  Normal or non-critical lab and imaging results will be communicated to you by [facility], letter or phone within 4 business days after the clinic has received the results. If you do not hear from us within 7 days, please contact the clinic through [facility] [employer] [facility] or phone. If you have a critical or abnormal lab result, we will notify you by phone as soon as possible.  Submit refill requests through [employer] or call your pharmacy and they will forward the refill request to us. Please allow 3 business days for your refill to be completed.          Additional Information About Your Visit        [employer] Information     [employer] gives you secure access to your electronic health record. If you see a primary care provider, you can also send  messages to your care team and make appointments. If you have questions, please call your primary care clinic.  If you do not have a primary care provider, please call [phone] and they will assist you.        Care EveryWhere ID     This is your Care EveryWhere ID. This could be used by other organizations to access your [facility] medical records  [id number]        Your Vitals Were     Pulse Pulse Oximetry                61 99%           Blood Pressure from Last 3 Encounters:   11/15/18 160/64   11/08/18 185/71   11/01/18 193/82    Weight from Last 3 Encounters:   11/06/18 190 lb 6.4 oz (86.4 kg)   11/01/18 191 lb (86.6 kg)   10/24/18 186 lb (84.4 kg)              Today, you had the following     No orders found for display         Today's Medication Changes          These changes are accurate as of 11/15/18  4:49 PM.  If you have any questions, ask your nurse or doctor.               These medicines have changed or have updated prescriptions.        Dose/Directions    BASAGLAR 100 UNIT/ML injection   This may have changed:  additional instructions   Used for:  Type 2 diabetes mellitus with diabetic neuropathy, with long-term current use of insulin (H)        Dose:  20 Units   Inject 20 Units Subcutaneous At Bedtime Inject 22 U SubCut at HS   Quantity:  3 mL   Refills:  11       furosemide 40 MG tablet   Commonly known as:  LASIX   This may have changed:  additional instructions   Used for:  CKD (chronic kidney disease) stage 5, GFR less than 15 ml/min (H), Type 2 diabetes mellitus with stage 4 chronic kidney disease, with long-term current use of insulin (H), Anemia due to stage 5 chronic kidney disease, 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 (H)        Dose:  40 mg   Take 1 tablet (40 mg) by mouth 2 times daily   Quantity:  60 tablet   Refills:  11                Primary Care Provider Office Phone # Fax #    Noam Jackson [phone]       [address] [place] [address]        Equal  Access to Services     [facility]: Hadii danisha jacobson xin Lam, waulyssesda luqadaha, qaybta kamarcusmadeline augustine. So [facility] [phone].    ATENCIÓN: Si habla español, tiene a santoro disposición servicios gratuitos de asistencia lingüística. Llame al [phone].    We comply with applicable federal civil rights laws and [place] laws. We do not discriminate on the basis of race, color, national origin, age, disability, sex, sexual orientation, or gender identity.            Thank you!     Thank you for choosing [facility]  for your care. Our goal is always to provide you with excellent care. Hearing back from our patients is one way we can continue to improve our services. Please take a few minutes to complete the written survey that you may receive in the mail after your visit with us. Thank you!             Your Updated Medication List - Protect others around you: Learn how to safely use, store and throw away your medicines at www.disposemymeds.org.          This list is accurate as of 11/15/18  4:49 PM.  Always use your most recent med list.                   Brand Name Dispense Instructions for use Diagnosis    acetaminophen 325 MG tablet    TYLENOL    100 tablet    Take 2 tablets (650 mg) by mouth every 4 hours as needed for mild pain    Diabetic ulcer of toe of right foot associated with type 2 diabetes mellitus, with other ulcer severity (H)       albuterol 108 (90 Base) MCG/ACT inhaler    PROAIR HFA/PROVENTIL HFA/VENTOLIN HFA    3 Inhaler    Inhale 2 puffs into the lungs every 6 hours as needed for shortness of breath / dyspnea or wheezing    Cough       atorvastatin 20 MG tablet    LIPITOR    90 tablet    TAKE 1 TABLET BY MOUTH ONCE DAILY    Hyperlipidemia LDL goal <100       BASAGLAR 100 UNIT/ML injection     3 mL    Inject 20 Units Subcutaneous At Bedtime Inject 22 U SubCut at HS    Type 2 diabetes mellitus with diabetic  neuropathy, with long-term current use of insulin (H)       carvedilol 12.5 MG tablet    COREG    60 tablet    Take 1 tablet (12.5 mg) by mouth 2 times daily (with meals)    Essential hypertension with goal blood pressure less than 140/90       clindamycin 1 % topical gel    CLINDAMAX     Apply topically 2 times daily        emollient cream      Apply topically 2 times daily        famotidine 20 MG tablet    PEPCID    60 tablet    Take 1 tablet (20 mg) by mouth daily    Right foot pain       furosemide 40 MG tablet    LASIX    60 tablet    Take 1 tablet (40 mg) by mouth 2 times daily    CKD (chronic kidney disease) stage 5, GFR less than 15 ml/min (H), Type 2 diabetes mellitus with stage 4 chronic kidney disease, with long-term current use of insulin (H), Anemia due to stage 5 chronic kidney disease, 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 (H)       insulin pen needle 31G X 6 MM    ULTICARE MINI    100 each    Use daily or as directed.    Type 2 diabetes, HbA1c goal < 7% (H)       NovoLOG FLEXPEN 100 UNIT/ML injection   Generic drug:  insulin aspart     15 mL    4 Units Inject 4 units SQ with breakfast, lunch and dinner. IF SNACK BETWEEN LUNCH AND DINNER SHE TAKES AN EXTRA 2 UNITS    Type 2 diabetes mellitus with hyperglycemia, with long-term current use of insulin (H)       ONETOUCH ULTRA test strip   Generic drug:  blood glucose monitoring     400 strip    TEST YOUR BLOOD SUGAR 3-4 TIMES PER DAY.    Type 2 diabetes mellitus with hyperglycemia, with long-term current use of insulin (H)       order for DME     1 Device    Equipment being ordered: Compression stockings, 20-30 MMHG, knee high    Edema, Hypertension goal BP (blood pressure) < 140/90       * order for DME     2 Device    Equipment being ordered: TEDS stocking  Below the knee 15-20 mg Dispense 2 Use daily    Localized edema       * order for DME     1 Device    1 wheelchair    Traumatic amputation of lower extremity above knee, unspecified laterality, subsequent  encounter (H), CKD (chronic kidney disease) stage 3, GFR 30-59 ml/min (H), Type 2 diabetes mellitus with stage 3 chronic kidney disease, without long-term current use of insulin (H)       * order for DME     1 Units    Equipment being ordered: Right Lower extremity Solar[employer] Ready wrap calf piece:  Size small/length tall , knee piece: Size small , Thigh piece size small/length average.    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    Dermatitis       vitamin D 2000 units tablet     100 tablet    Take 2,000 Units by mouth daily    Vitamin D deficiency       * Notice:  This list has 3 medication(s) that are the same as other medications prescribed for you. Read the directions carefully, and ask your doctor or other care provider to review them with you.

## 2018-11-15 NOTE — LETTER
11/15/2018      RE: Supriya Herr  [address]  [facility] [id number]       Dear Colleague,    Thank you for the opportunity to participate in the care of your patient, Supriya Herr, at the [facility]. Please see a copy of my visit note below.    I am delighted to see Supriya Herr in consultation for chest pain. She is accompanied by her daughter with whom she lives and is her main caretaker.    History of Present Illness:  As you know, the patient is a 68 year old  Female who saw my colleague Dr. Milton Guadarrama on 4/23/18 as part of renal transplant evaluation. She has a h/o diabetes, hypertension, and chronic kidney disease. Evaluation at the time included normal echocardiogram and normal lexiscan nuclear stress test. Aspirin initiation was recommended, if safe from renal standpoint, as well as aggressive management of BP.    She had been doing well with stable BP control and reportedly was listed for transplant. This summer developed a foot infection which made her inactive for transplant. She underwent surgery, then went to TCU. In TCU she was not getting her usual meds and BP readings were high. She has been experiencing intermittent chest pain since she returned home from TCU - she describes symptoms as high mid chest pressure, occurs in the evenings, frequently when she's stressed. She takes a couple of deep breaths and it goes away. Inhalers help also. She has symptoms a few times a week, does not seem to be worse. BP has been high at home. Lasix dose just increased yesterday.    She denies syncope, palpitations. 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 She sleeps in a hospital bed with head of bed elevated about 90 degrees - has been doing this for several years.     The following portions of the patient's history were reviewed and updated as appropriate: allergies,  current medications, past family history, past medical history, past social history, past surgical history, and the problem list.    Past Medical History:  Hypertension  Diabetes type II  Chronic kidney disease, no on dialysis. Daughter potential donor.  Anemia  Anxiety/depression  Hyperlipidemia  Traumatic amputation of left leg  Former smoker but still smokes a few cigarettes    Medications:   Atorvastatin 20 every day  Carvedilol 12.5 bid  Lasix 60 qam/40 qpm (increased yesterday from 40/20)  Insulin  Zoloft  Vitamin D      Allergies:    Allergies   Allergen Reactions     Penicillins Rash     Unasyn Rash     No evidence SJS, but very uncomfortable and precipitated multiple provider visits. Would not use penicillins again if other options available.          Family History:   Family History   Problem Relation Age of Onset     Diabetes Mother      Hypertension Mother      HEART DISEASE Mother       of congestive heart failure     Eye Disorder Mother      Arthritis Mother      Obesity Mother      HEART DISEASE Father       from CHF     Cerebrovascular Disease Father      Arthritis Father      Musculoskeletal Disorder Other      has MS     Thyroid Disease Other      Eye Disorder Other      cataracts     Cancer Other      throat/liver     Skin Cancer No family hx of      Melanoma No family hx of      Glaucoma No family hx of      Macular Degeneration No family hx of        Psychosocial history:  reports that she has been smoking Cigarettes.  She started smoking about 54 years ago. She has a 26.00 pack-year smoking history. She has never used smokeless tobacco. She reports that she does not drink alcohol or use illicit drugs.    Review of systems:   Cardiovascular: No palpitations, chest pain, shortness of breath at rest, dyspnea with exertion, orthopnea, paroxysmal nocturia dyspnea, nocturia, dizziness, syncope.    In addition,   Constitutional: No change in weight, sleep or appetite.  Normal energy.  No fever  or chills  Eyes: Negative for vision changes or eye problems  ENT: No problems with ears, nose or throat.  No difficulty swallowing.  Resp: No coughing, wheezing or shortness of breath  GI: No nausea, vomiting,  heartburn, abdominal pain, diarrhea, constipation or change in bowel habits  : No urinary frequency or dysuria, bladder or kidney problems  Musculoskeletal: No significant muscle or joint pains  Neurologic: No headaches, numbness, tingling, weakness, problems with balance or coordination  Psychiatric: No problems with anxiety, depression or mental health  Heme/immune/allergy: No history of bleeding or clotting problems or anemia.  No allergies or immune system problems  Integumentary: No rashes,worrisome lesions or skin problems      Physical examination  Vitals: 190/64, HR 61  BMI= 30    Constitutional: In general, the patient is a pleasant female in no apparent distress. Grayish hue.  Eyes: PERRLA.  EOMI.  Sclerae white, not injected.  ENT/mouth: Normiocephalic and atraumatic.  Nares clear.  Pharynx without erythema or exudate.  Dentition intact.  No adenopathy.  No thyromegaly. Carotids +2/2 bilaterally without bruits.  No jugular venous distension.   Card/Vasc: The PMI is in the 5th ICS in the midclavicular line. There is no heave. Regular rate and rhythm. Normal S1, S2. No murmur, rub, click, or gallop. Pulses are normal bilaterally throughout. 1-2+ right peripheral edema with venous stasis changes.  Respiratory: Clear to asculation.  No ronchi, wheezes, rales.  No dullness to percussion.   GI: Abdomen is soft, nontender, nondistended. No organomegaly. No AAA.  No bruits.   Integument: No significant bruises or rashes  Neurological: The neurological examination reveal a patient who was oriented to person, place, and time.    Psych: Normal  Heme/Lymph/Immun: no significant adenopathy  Left BKA    I have reviewed the following labs/imaging:  Nuclear stress test 5/22/18: normal EF 58%, no ischemia  Echo  3/29/18: EF 60-65%, no valve disease. LVH noted.  Labs: 3/29/18: cholesterol 179, HDL 45, ,   11/1/18: K 3.8, cr 3.17, hgb 9  A1C 6/22/18 was 6.0    I have personally and independently reviewed the following:  EKG 3/29/18: normal sinus, normal intervals    Assessment :  Chest pressure. Noncardiac by description. Normal stress test a few months ago. Suggest ongoing aggressive control of BP - reportedly well controlled in the summer but may have been perturbed by recent foot surgery, medicine changes in TCU, ongoing right knee pain.    No repeat stress testing necessary.   Would not pursue coronary angiogram given renal dysfunction.  No cardiovascular issues to proceed with transplant.    Plan:  Continue BP management.    I spent a total of 30 minutes face to face with  Supriya Herr during today's office visit. Over 50% of this time was spent counseling the patient and/or coordinating care regarding BP management strategies.      The patient is to return as needed. The patient understood the treatment plan as outlined above.  There were no barriers to learning.      Orly Dumont MD

## 2018-11-25 DIAGNOSIS — E55.9 VITAMIN D DEFICIENCY: ICD-10-CM

## 2018-11-26 RX ORDER — CALCITRIOL 0.25 UG/1
CAPSULE, LIQUID FILLED ORAL
Qty: 30 CAPSULE | Refills: 3 | Status: SHIPPED | OUTPATIENT
Start: 2018-11-26 | End: 2018-11-30

## 2018-11-26 NOTE — TELEPHONE ENCOUNTER
Last Office Visit with Nephrologist:  11/1/2018.  Medication refilled per Nephrology Clinic protocol.    Pravin Cordova RN

## 2018-11-27 ENCOUNTER — TELEPHONE (OUTPATIENT)
Dept: TRANSPLANT | Facility: CLINIC | Age: 68
End: 2018-11-27

## 2018-11-27 NOTE — TELEPHONE ENCOUNTER
Called Caroline to talk about the status of her mother, Supriya. Supriya lives with Caroline and Caroline takes care of her and assists with all her appointments. Caroline was under the impression that after her mothers foot wound healed, she could be active on the wait list. Actually, Supriya has 2 pulmonary nodules with moderate risk of developing cancer. She is under the care of Dr. King, who has placed her in a 2 year surveillance plan to monitor the nodules. Her next imaging and appointment is 1.22.19.   I reviewed that with that great of a risk (6-65%), if we made her active and she received a kidney, with being immunocompromised, the risk is too great of her mother developing cancer. Caroline agrees, however is disappointed. I suggested she discuss the risk factor with Dr. King in January, and inquire about when/if her mother can be cleared for transplant with these nodules.   Caroline also inquired about being a living donor and the process. Reviewed with her what is required and timeline.   She will consider.

## 2018-11-30 ENCOUNTER — DOCUMENTATION ONLY (OUTPATIENT)
Dept: CARE COORDINATION | Facility: CLINIC | Age: 68
End: 2018-11-30

## 2018-11-30 ENCOUNTER — OFFICE VISIT (OUTPATIENT)
Dept: FAMILY MEDICINE | Facility: CLINIC | Age: 68
End: 2018-11-30
Payer: MEDICARE

## 2018-11-30 ENCOUNTER — TELEPHONE (OUTPATIENT)
Dept: FAMILY MEDICINE | Facility: CLINIC | Age: 68
End: 2018-11-30

## 2018-11-30 VITALS
DIASTOLIC BLOOD PRESSURE: 59 MMHG | OXYGEN SATURATION: 100 % | TEMPERATURE: 98.2 F | SYSTOLIC BLOOD PRESSURE: 138 MMHG | HEART RATE: 63 BPM

## 2018-11-30 DIAGNOSIS — Z09 HOSPITAL DISCHARGE FOLLOW-UP: Primary | ICD-10-CM

## 2018-11-30 DIAGNOSIS — L89.899 PRESSURE INJURY OF SKIN OF RIGHT FOOT, UNSPECIFIED INJURY STAGE: ICD-10-CM

## 2018-11-30 DIAGNOSIS — Z79.4 TYPE 2 DIABETES MELLITUS WITH DIABETIC NEUROPATHY, WITH LONG-TERM CURRENT USE OF INSULIN (H): ICD-10-CM

## 2018-11-30 DIAGNOSIS — S78.112D: ICD-10-CM

## 2018-11-30 DIAGNOSIS — N18.5 CKD (CHRONIC KIDNEY DISEASE) STAGE 5, GFR LESS THAN 15 ML/MIN (H): ICD-10-CM

## 2018-11-30 DIAGNOSIS — Z99.3 WHEELCHAIR BOUND: ICD-10-CM

## 2018-11-30 DIAGNOSIS — E11.40 TYPE 2 DIABETES MELLITUS WITH DIABETIC NEUROPATHY, WITH LONG-TERM CURRENT USE OF INSULIN (H): ICD-10-CM

## 2018-11-30 DIAGNOSIS — Z23 NEED FOR HEPATITIS B VACCINATION: ICD-10-CM

## 2018-11-30 DIAGNOSIS — E55.9 VITAMIN D DEFICIENCY: ICD-10-CM

## 2018-11-30 DIAGNOSIS — M62.81 GENERALIZED MUSCLE WEAKNESS: ICD-10-CM

## 2018-11-30 PROCEDURE — 90746 HEPB VACCINE 3 DOSE ADULT IM: CPT | Performed by: FAMILY MEDICINE

## 2018-11-30 PROCEDURE — 90471 IMMUNIZATION ADMIN: CPT | Performed by: FAMILY MEDICINE

## 2018-11-30 PROCEDURE — 99214 OFFICE O/P EST MOD 30 MIN: CPT | Mod: 25 | Performed by: FAMILY MEDICINE

## 2018-11-30 RX ORDER — CALCITRIOL 0.25 UG/1
CAPSULE, LIQUID FILLED ORAL
Qty: 90 CAPSULE | Refills: 3 | Status: SHIPPED | OUTPATIENT
Start: 2018-11-30 | End: 2019-07-09

## 2018-11-30 NOTE — PROGRESS NOTES
SUBJECTIVE:   Supriya Herr is a 68 year old female who presents to clinic today for the following health issues:    Hospital Follow-up Visit:    Hospital/Nursing Home/ Rehab Facility: [facility]  Date of Admission: 9/26/18  Date of Discharge: 10/27/18  Reason(s) for Admission: surgery  Encounter Diagnoses   Name Primary?     Hospital discharge follow-up Yes     CKD (chronic kidney disease) stage 5, GFR less than 15 ml/min (H)      Traumatic amputation of left lower extremity above knee, subsequent encounter (H)      Type 2 diabetes mellitus with diabetic neuropathy, with long-term current use of insulin (H)      Generalized muscle weakness      Pressure injury of skin of right foot, unspecified injury stage      Vitamin D deficiency      Need for hepatitis B vaccination                 Problems taking medications regularly:  None       Medication changes since discharge: Yes       Problems adhering to non-medication therapy:  None- finished PT yesterday, doing OT    Summary of hospitalization:  [facility] discharge summary reviewed  Diagnostic Tests/Treatments reviewed.  Follow up needed: Ortho/ nephrology,   Other Healthcare Providers Involved in Patient s Care:         Specialist appointment - as schediuled  Update since discharge: fluctuating course.   Wheelchair bound, needs new pad for chair  Due to weakness needs lift at home( had fallen to ground trying to self transfer)  Also to help prevent pressure sores needs new mattress/ and cover    Post Discharge Medication Reconciliation: discharge medications reconciled, continue medications without change.  Plan of care communicated with patient and family     Coding guidelines for this visit:  Type of Medical   Decision Making Face-to-Face Visit       within 7 Days of discharge Face-to-Face Visit        within 14 days of discharge   Moderate Complexity 39085 [id number]   High Complexity 25461 [id number]          Other questions/concerns: check foot        Diabetes Follow-up       *    BP Readings from Last 2 Encounters:   11/30/18 138/59   11/15/18 160/64     Hemoglobin A1C (%)   Date Value   06/22/2018 6.0 (H)   03/29/2018 5.4     LDL Cholesterol Calculated (mg/dL)   Date Value   03/29/2018 108 (H)   09/21/2017 68       Diabetes Management Resources  Chronic Kidney Disease Follow-up      Current NSAID use?  No      Problem list and histories reviewed & adjusted, as indicated.  Additional history: as documented    Labs reviewed in EPIC    Reviewed and updated as needed this visit by clinical staff       Reviewed and updated as needed this visit by Provider         ROS:  Constitutional, HEENT, cardiovascular, pulmonary, gi and gu systems are negative, except as otherwise noted.    OBJECTIVE:     /59 (BP Location: Left arm, Patient Position: Sitting, Cuff Size: Adult Large)  Pulse 63  Temp 98.2  F (36.8  C) (Oral)  SpO2 100%  There is no height or weight on file to calculate BMI.  GENERAL: alert, frail, elderly, fatigued and sitting in wheelchair  EYES: Eyes grossly normal to inspection, PERRL and conjunctivae and sclerae normal  HENT: ear canals and TM's normal, nose and mouth without ulcers or lesions  NECK: no adenopathy, no asymmetry, masses, or scars and thyroid normal to palpation  RESP: lungs clear to auscultation - no rales, rhonchi or wheezes  CV: regular rates and rhythm and normal S1 S2, no S3 or S4  ABDOMEN: soft, nontender, no hepatosplenomegaly, no masses and bowel sounds normal  MS: muscle wasting arms/rt leg, trace edema to shin right  and abnormal decreazsed pulse(s) right. status post amputation left leg  SKIN: mildly erythematous abrasions right foot/ 3rd toe -   NEURO: mentation intact  PSYCH: mentation appears normal, anxious, speech pressured and judgement and insight intact  LYMPH: no cervical, supraclavicular, axillary, or inguinal adenopathy    Diagnostic Test Results:  none     ASSESSMENT/PLAN:             1. Hospital discharge  follow-up   Wheelchair bound, needs new pad for chair  Due to weakness needs lift at home( had fallen to ground trying to self transfer)  Also to help prevent pressure sores needs new mattress/ and cover  Doing home PHYSICAL THERAPY/OT  2. CKD (chronic kidney disease) stage 5, GFR less than 15 ml/min (H)  On transplant list?    3. Traumatic amputation of left lower extremity above knee, subsequent encounter (H)  Discussed skin cares    4. Type 2 diabetes mellitus with diabetic neuropathy, with long-term current use of insulin (H)  controll better    5. Generalized muscle weakness  Working on PHYSICAL THERAPY but needs lift    6. Pressure injury of skin of right foot, unspecified injury stage  preston use podiatrist next week./ needs new shoes/ discuuxed cares    7. Vitamin D deficiency  Ok  - calcitRIOL (ROCALTROL) 0.25 MCG capsule; TAKE 1 CAPSULE (0.25 MCG) BY MOUTH DAILY  Dispense: 90 capsule; Refill: 3    8. Need for hepatitis B vaccination  don3  - HEPATITIS B VACCINE,ADULT,IM  - ADMIN 1st VACCINE    9. Wheelchair bound  As above      Follow up in 3 months.     Noam Jackson MD  [facility] [place]

## 2018-11-30 NOTE — TELEPHONE ENCOUNTER
Reason for call:  Order   Order or referral being requested: Requesting to continue occupational therapy for 1 time a week for 3 weeks for DME needs.   Reason for request: DME needs  Date needed: as soon as possible  Has the patient been seen by the PCP for this problem? YES    Additional comments: na    Phone number to reach patient:  Other phone number:    Lynette Boyle OT: [phone]    Best Time:  any    Can we leave a detailed message on this number?  YES

## 2018-11-30 NOTE — PROGRESS NOTES
[facility] now requests orders and shares plan of care/discharge summaries for some patients through [employer].  Please REPLY TO THIS MESSAGE OR ROUTE BACK TO THE AUTHOR in order to give authorization for orders when needed.  This is considered a verbal order, you will still receive a faxed copy of orders for signature.  Thank you for your assistance in improving collaboration for our patients.    ORDER  Skilled Nursing   1 week 3  2 as needed    Main goal of plan  Disease process education, skin assessment, cardiac assessment, wound assessment      Wound care order  Wound to right third toe   1. Remove dressing  2. cleanse wound with soap and water  3. Apply small amount of medihoney  4. Cover with bandaid  Change daily. Family to perform on non nursing days    Erick Pérez RN   [facility]  [phone]

## 2018-11-30 NOTE — TELEPHONE ENCOUNTER
Verbal OK for skilled OT orders, VM on OTs secure VM    Viridiana Sprague RN   [facility] [place]

## 2018-12-06 ENCOUNTER — TELEPHONE (OUTPATIENT)
Dept: FAMILY MEDICINE | Facility: CLINIC | Age: 68
End: 2018-12-06

## 2018-12-06 ENCOUNTER — MEDICAL CORRESPONDENCE (OUTPATIENT)
Dept: HEALTH INFORMATION MANAGEMENT | Facility: CLINIC | Age: 68
End: 2018-12-06

## 2018-12-06 DIAGNOSIS — N18.5 CKD (CHRONIC KIDNEY DISEASE) STAGE 5, GFR LESS THAN 15 ML/MIN (H): Primary | ICD-10-CM

## 2018-12-06 NOTE — TELEPHONE ENCOUNTER
Erick with  HC called. Verbal orders given per protocol for the following:    Skilled Nursing:  ~ 1 time/week x 3 weeks   ~ 2 PRN visits     Wound care order:  Wound to right third toe   1. Remove dressing  2. cleanse wound with soap and water  3. Apply small amount of medihoney  4. Cover with bandaid  Change daily. Family to perform on non nursing days    Cheryl Law RN  Triage Nurse

## 2018-12-07 ENCOUNTER — OFFICE VISIT (OUTPATIENT)
Dept: PHARMACY | Facility: CLINIC | Age: 68
End: 2018-12-07

## 2018-12-07 ENCOUNTER — OFFICE VISIT (OUTPATIENT)
Dept: NEPHROLOGY | Facility: CLINIC | Age: 68
End: 2018-12-07
Payer: MEDICARE

## 2018-12-07 ENCOUNTER — OFFICE VISIT (OUTPATIENT)
Dept: ORTHOPEDICS | Facility: CLINIC | Age: 68
End: 2018-12-07
Payer: MEDICARE

## 2018-12-07 VITALS — DIASTOLIC BLOOD PRESSURE: 71 MMHG | SYSTOLIC BLOOD PRESSURE: 131 MMHG

## 2018-12-07 DIAGNOSIS — N18.5 ANEMIA DUE TO STAGE 5 CHRONIC KIDNEY DISEASE, NOT ON CHRONIC DIALYSIS (H): ICD-10-CM

## 2018-12-07 DIAGNOSIS — E11.40 TYPE 2 DIABETES MELLITUS WITH DIABETIC NEUROPATHY, WITH LONG-TERM CURRENT USE OF INSULIN (H): ICD-10-CM

## 2018-12-07 DIAGNOSIS — N18.5 CKD (CHRONIC KIDNEY DISEASE) STAGE 5, GFR LESS THAN 15 ML/MIN (H): ICD-10-CM

## 2018-12-07 DIAGNOSIS — E11.22 TYPE 2 DIABETES MELLITUS WITH STAGE 4 CHRONIC KIDNEY DISEASE, WITH LONG-TERM CURRENT USE OF INSULIN (H): ICD-10-CM

## 2018-12-07 DIAGNOSIS — Z89.612 S/P AKA (ABOVE KNEE AMPUTATION) UNILATERAL, LEFT (H): ICD-10-CM

## 2018-12-07 DIAGNOSIS — B35.1 DERMATOPHYTOSIS OF NAIL: ICD-10-CM

## 2018-12-07 DIAGNOSIS — Z79.4 TYPE 2 DIABETES MELLITUS WITH DIABETIC NEUROPATHY, WITH LONG-TERM CURRENT USE OF INSULIN (H): ICD-10-CM

## 2018-12-07 DIAGNOSIS — N18.5 CKD (CHRONIC KIDNEY DISEASE) STAGE 5, GFR LESS THAN 15 ML/MIN (H): Primary | ICD-10-CM

## 2018-12-07 DIAGNOSIS — I15.1 HYPERTENSION SECONDARY TO OTHER RENAL DISORDERS: Primary | ICD-10-CM

## 2018-12-07 DIAGNOSIS — N18.4 TYPE 2 DIABETES MELLITUS WITH STAGE 4 CHRONIC KIDNEY DISEASE, WITH LONG-TERM CURRENT USE OF INSULIN (H): ICD-10-CM

## 2018-12-07 DIAGNOSIS — D63.1 ANEMIA DUE TO STAGE 5 CHRONIC KIDNEY DISEASE, NOT ON CHRONIC DIALYSIS (H): ICD-10-CM

## 2018-12-07 DIAGNOSIS — M20.42 HAMMERTOES OF BOTH FEET: ICD-10-CM

## 2018-12-07 DIAGNOSIS — N25.81 SECONDARY RENAL HYPERPARATHYROIDISM (H): ICD-10-CM

## 2018-12-07 DIAGNOSIS — L98.9 SKIN LESION: Primary | ICD-10-CM

## 2018-12-07 DIAGNOSIS — I10 BENIGN ESSENTIAL HYPERTENSION: ICD-10-CM

## 2018-12-07 DIAGNOSIS — Z79.4 TYPE 2 DIABETES MELLITUS WITH STAGE 4 CHRONIC KIDNEY DISEASE, WITH LONG-TERM CURRENT USE OF INSULIN (H): ICD-10-CM

## 2018-12-07 DIAGNOSIS — M20.41 HAMMERTOES OF BOTH FEET: ICD-10-CM

## 2018-12-07 LAB
ALBUMIN SERPL-MCNC: 2.7 G/DL (ref 3.4–5)
ANION GAP SERPL CALCULATED.3IONS-SCNC: 7 MMOL/L (ref 3–14)
BASOPHILS # BLD AUTO: 0.1 10E9/L (ref 0–0.2)
BASOPHILS NFR BLD AUTO: 1 %
BUN SERPL-MCNC: 74 MG/DL (ref 7–30)
CALCIUM SERPL-MCNC: 8.3 MG/DL (ref 8.5–10.1)
CHLORIDE SERPL-SCNC: 111 MMOL/L (ref 94–109)
CO2 SERPL-SCNC: 25 MMOL/L (ref 20–32)
CREAT SERPL-MCNC: 3.99 MG/DL (ref 0.52–1.04)
CREAT UR-MCNC: 55 MG/DL
DIFFERENTIAL METHOD BLD: ABNORMAL
EOSINOPHIL # BLD AUTO: 0.3 10E9/L (ref 0–0.7)
EOSINOPHIL NFR BLD AUTO: 4.4 %
ERYTHROCYTE [DISTWIDTH] IN BLOOD BY AUTOMATED COUNT: 12.4 % (ref 10–15)
FERRITIN SERPL-MCNC: 348 NG/ML (ref 8–252)
GFR SERPL CREATININE-BSD FRML MDRD: 11 ML/MIN/1.7M2
GLUCOSE SERPL-MCNC: 117 MG/DL (ref 70–99)
HCT VFR BLD AUTO: 32.3 % (ref 35–47)
HGB BLD-MCNC: 10.3 G/DL (ref 11.7–15.7)
IMM GRANULOCYTES # BLD: 0 10E9/L (ref 0–0.4)
IMM GRANULOCYTES NFR BLD: 0.2 %
IRON SATN MFR SERPL: 18 % (ref 15–46)
IRON SERPL-MCNC: 42 UG/DL (ref 35–180)
LYMPHOCYTES # BLD AUTO: 1.3 10E9/L (ref 0.8–5.3)
LYMPHOCYTES NFR BLD AUTO: 22.7 %
MCH RBC QN AUTO: 29.7 PG (ref 26.5–33)
MCHC RBC AUTO-ENTMCNC: 31.9 G/DL (ref 31.5–36.5)
MCV RBC AUTO: 93 FL (ref 78–100)
MONOCYTES # BLD AUTO: 0.5 10E9/L (ref 0–1.3)
MONOCYTES NFR BLD AUTO: 7.9 %
NEUTROPHILS # BLD AUTO: 3.7 10E9/L (ref 1.6–8.3)
NEUTROPHILS NFR BLD AUTO: 63.8 %
NRBC # BLD AUTO: 0 10*3/UL
NRBC BLD AUTO-RTO: 0 /100
PHOSPHATE SERPL-MCNC: 4.8 MG/DL (ref 2.5–4.5)
PLATELET # BLD AUTO: 237 10E9/L (ref 150–450)
POTASSIUM SERPL-SCNC: 4.3 MMOL/L (ref 3.4–5.3)
PROT UR-MCNC: 3.09 G/L
PROT/CREAT 24H UR: 5.6 G/G CR (ref 0–0.2)
RBC # BLD AUTO: 3.47 10E12/L (ref 3.8–5.2)
SODIUM SERPL-SCNC: 143 MMOL/L (ref 133–144)
TIBC SERPL-MCNC: 240 UG/DL (ref 240–430)
WBC # BLD AUTO: 5.7 10E9/L (ref 4–11)

## 2018-12-07 PROCEDURE — 80069 RENAL FUNCTION PANEL: CPT

## 2018-12-07 PROCEDURE — 36415 COLL VENOUS BLD VENIPUNCTURE: CPT

## 2018-12-07 PROCEDURE — 83550 IRON BINDING TEST: CPT

## 2018-12-07 PROCEDURE — 84156 ASSAY OF PROTEIN URINE: CPT

## 2018-12-07 PROCEDURE — 99606 MTMS BY PHARM EST 15 MIN: CPT | Performed by: PHARMACIST

## 2018-12-07 PROCEDURE — 85025 COMPLETE CBC W/AUTO DIFF WBC: CPT

## 2018-12-07 PROCEDURE — 82728 ASSAY OF FERRITIN: CPT

## 2018-12-07 PROCEDURE — 83540 ASSAY OF IRON: CPT

## 2018-12-07 PROCEDURE — G0463 HOSPITAL OUTPT CLINIC VISIT: HCPCS | Mod: ZF

## 2018-12-07 ASSESSMENT — PAIN SCALES - GENERAL: PAINLEVEL: NO PAIN (0)

## 2018-12-07 NOTE — MR AVS SNAPSHOT
After Visit Summary   12/7/2018    Supriya Herr    MRN: [id number]           Patient Information     Date Of Birth          1950        Visit Information        Provider Department      12/7/2018 1:40 PM Eleazar Pack DPM [facility]        Today's Diagnoses     Skin lesion    -  1      Care Instructions    cheng 3 months    Derm - ASAP          Follow-ups after your visit        Additional Services     DERMATOLOGY REFERRAL       Your provider has referred you to: [facility]: Dermatology Clinic [facility] [phone]   [email or web address]     Please be aware that coverage of these services is subject to the terms and limitations of your health insurance plan.  Call member services at your health plan with any benefit or coverage questions.      Please bring the following with you to your appointment:    (1) Any X-Rays, CTs or MRIs which have been performed.  Contact the facility where they were done to arrange for  prior to your scheduled appointment.    (2) List of current medications  (3) This referral request   (4) Any documents/labs given to you for this referral                  Follow-up notes from your care team     Return in about 3 months (around 3/7/2019).      Your next 10 appointments already scheduled     Dec 07, 2018  2:30 PM CST   Lab with  LAB   [facility] ([facility])    [address] [place] [id number] [phone]            Dec 07, 2018  3:00 PM CST   (Arrive by 2:45 PM)   SHORT with Brenden Blackwell RPH   [facility] Medication Therapy Management ([facility])    [address] [place] [id number] [phone]            Dec 07, 2018  3:30 PM CST   (Arrive by 3:00 PM)   Return Visit with Silva Guerrero NP   [facility] Nephrology ([facility])    [address] [place] [address]  Se  [address]  [facility] [address]   [phone]            Jan 22, 2019  3:20 PM CST   CT CHEST W/O CONTRAST with UCCT2   [facility] CT ([facility])    [address]  1st Floor  [facility] [address]   [phone]           How do I prepare for my exam? (Food and drink instructions) No Food and Drink Restrictions.  How do I prepare for my exam? (Other instructions) You do not need to do anything special to prepare for this exam. For a sinus scan: Use your nose spray (nasal decongestant spray) as directed.  What should I wear: Please wear loose clothing, such as a sweat suit or jogging clothes. Avoid snaps, zippers and other metal. We may ask you to undress and put on a hospital gown.  How long does the exam take: Most scans take less than 20 minutes.  What should I bring: Please bring any scans or X-rays taken at other hospitals, if similar tests were done. Also bring a list of your medicines, including vitamins, minerals and over-the-counter drugs. It is safest to leave personal items at home.  Do I need a : No  is needed.  What do I need to tell my doctor? Be sure to tell your doctor: * If you have any allergies. * If there s any chance you are pregnant. * If you are breastfeeding.  What should I do after the exam: No restrictions, You may resume normal activities.  What is this test: A CT (computed tomography) scan is a series of pictures that allows us to look inside your body. The scanner creates images of the body in cross sections, much like slices of bread. This helps us see any problems more clearly.  Who should I call with questions: If you have any questions, please call the Imaging Department where you will have your exam. Directions, parking instructions, and other information is available on our website, [email or web address].            Jan 22, 2019  4:30 PM CST   (Arrive by 4:15 PM)   Return Visit with Ravin King MD   [facility]  [facility] ([facility])    [address]  [address]  [facility] [id number] [phone]            Feb 08, 2019 11:00 AM CST   (Arrive by 10:45 AM)   RETURN DIABETES with Arabella Kamara PA-C   [facility] Endocrinology ([facility])    [address]  3rd Floor  [facility] [id number] [phone]              Future tests that were ordered for you today     Open Future Orders        Priority Expected Expires Ordered    Renal panel Routine 12/7/2018 3/6/2019 12/6/2018    CBC with platelets differential Routine 12/7/2018 3/6/2019 12/6/2018    Protein  random urine with Creat Ratio Routine 12/7/2018 3/6/2019 12/6/2018            Who to contact     Please call your clinic at [phone] to:    Ask questions about your health    Make or cancel appointments    Discuss your medicines    Learn about your test results    Speak to your doctor            Additional Information About Your Visit        [employer] Information     [employer] gives you secure access to your electronic health record. If you see a primary care provider, you can also send messages to your care team and make appointments. If you have questions, please call your primary care clinic.  If you do not have a primary care provider, please call [phone] and they will assist you.      [employer] is an electronic gateway that provides easy, online access to your medical records. With [employer], you can request a clinic appointment, read your test results, renew a prescription or communicate with your care team.     To access your existing account, please contact your [facility] or call [phone] for assistance.        Care EveryWhere ID     This is your Care EveryWhere ID. This could be used by other organizations to access your [facility] medical records  [id number]         Blood Pressure from Last 3 Encounters:   11/30/18 138/59   11/15/18  160/64   11/08/18 185/71    Weight from Last 3 Encounters:   11/06/18 86.4 kg (190 lb 6.4 oz)   11/01/18 86.6 kg (191 lb)   10/24/18 84.4 kg (186 lb)              We Performed the Following     DERMATOLOGY REFERRAL          Today's Medication Changes          These changes are accurate as of 12/7/18  1:54 PM.  If you have any questions, ask your nurse or doctor.               These medicines have changed or have updated prescriptions.        Dose/Directions    insulin glargine 100 UNIT/ML pen   This may have changed:  additional instructions   Used for:  Type 2 diabetes mellitus with diabetic neuropathy, with long-term current use of insulin (H)        Dose:  20 Units   Inject 20 Units Subcutaneous At Bedtime Inject 22 U SubCut at HS   Quantity:  3 mL   Refills:  11                Primary Care Provider Office Phone # Fax #    Noam Luis Jackson [phone]       [address] [facility] [address]  [facility] [address]        Equal Access to Services     [facility] AH: Hadii aad ku hadasho Soomaali, waaxda luqadaha, qaybta kaalmada adeegyada, waxay idiin hayamy sood . So [facility] [phone].    ATENCIÓN: Si habla español, tiene a santoro disposición servicios gratuitos de asistencia lingüística. Llame al [phone].    We comply with applicable federal civil rights laws and [place] laws. We do not discriminate on the basis of race, color, national origin, age, disability, sex, sexual orientation, or gender identity.            Thank you!     Thank you for choosing [facility]  for your care. Our goal is always to provide you with excellent care. Hearing back from our patients is one way we can continue to improve our services. Please take a few minutes to complete the written survey that you may receive in the mail after your visit with us. Thank you!             Your Updated Medication List - Protect others around you: Learn how to safely use, store and throw away your  medicines at www.disposemymeds.org.          This list is accurate as of 12/7/18  1:54 PM.  Always use your most recent med list.                   Brand Name Dispense Instructions for use Diagnosis    acetaminophen 325 MG tablet    TYLENOL    100 tablet    Take 2 tablets (650 mg) by mouth every 4 hours as needed for mild pain    Diabetic ulcer of toe of right foot associated with type 2 diabetes mellitus, with other ulcer severity (H)       albuterol 108 (90 Base) MCG/ACT inhaler    PROAIR HFA/PROVENTIL HFA/VENTOLIN HFA    3 Inhaler    Inhale 2 puffs into the lungs every 6 hours as needed for shortness of breath / dyspnea or wheezing    Cough       atorvastatin 20 MG tablet    LIPITOR    90 tablet    TAKE 1 TABLET BY MOUTH ONCE DAILY    Hyperlipidemia LDL goal <100       calcitRIOL 0.25 MCG capsule    ROCALTROL    90 capsule    TAKE 1 CAPSULE (0.25 MCG) BY MOUTH DAILY    Vitamin D deficiency       carvedilol 12.5 MG tablet    COREG    60 tablet    Take 1 tablet (12.5 mg) by mouth 2 times daily (with meals)    Essential hypertension with goal blood pressure less than 140/90       clindamycin 1 % external gel    CLINDAMAX     Apply topically 2 times daily        emollient external cream      Apply topically 2 times daily        famotidine 20 MG tablet    PEPCID    60 tablet    Take 1 tablet (20 mg) by mouth daily    Right foot pain       furosemide 40 MG tablet    LASIX    60 tablet    Take 1 tablet (40 mg) by mouth 2 times daily    CKD (chronic kidney disease) stage 5, GFR less than 15 ml/min (H), Type 2 diabetes mellitus with stage 4 chronic kidney disease, with long-term current use of insulin (H), Anemia due to stage 5 chronic kidney disease, 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 (H)       insulin glargine 100 UNIT/ML pen     3 mL    Inject 20 Units Subcutaneous At Bedtime Inject 22 U SubCut at HS    Type 2 diabetes mellitus with diabetic neuropathy, with long-term current use of insulin (H)       insulin pen needle 31G X 6 MM  miscellaneous    ULTICARE MINI    100 each    Use daily or as directed.    Type 2 diabetes, HbA1c goal < 7% (H)       NovoLOG FLEXPEN 100 UNIT/ML pen   Generic drug:  insulin aspart     15 mL    4 Units Inject 4 units SQ with breakfast, lunch and dinner. IF SNACK BETWEEN LUNCH AND DINNER SHE TAKES AN EXTRA 2 UNITS    Type 2 diabetes mellitus with hyperglycemia, with long-term current use of insulin (H)       ONETOUCH ULTRA test strip   Generic drug:  blood glucose monitoring     400 strip    TEST YOUR BLOOD SUGAR 3-4 TIMES PER DAY.    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    Dermatitis       vitamin D3 2000 units tablet    CHOLECALCIFEROL    100 tablet    Take 2,000 Units by mouth daily    Vitamin D deficiency       * Notice:  This list has 3 medication(s) that are the same as other medications prescribed for you. Read the directions carefully, and ask your doctor or other care provider to review them with you.

## 2018-12-07 NOTE — LETTER
12/7/2018       RE: Supriya Herr  [address]  [facility] [address]     Dear Colleague,    Thank you for referring your patient, Supriya Herr, to the [facility]. Please see a copy of my visit note below.    Chief Complaint:   Chief Complaint   Patient presents with     RECHECK     diabetic nail care          Allergies   Allergen Reactions     Penicillins Rash     Unasyn Rash     No evidence SJS, but very uncomfortable and precipitated multiple provider visits. Would not use penicillins again if other options available.          Subjective: Supriya is a 68 year old female who presents to the clinic today for a diabetic foot exam and management. Since the last visit, she did have the second met head on the right foot resected.  She relates that this is healed well.  However, she does keep a Band-Aid on the surgical incision.  She was if she is not having any pain in the foot.  She relates that on the top of the right foot, she has noticed a new lesion that is formed.  She does not how long it has been there for.  She relates that it does not hurt.  This area does not bleed.  She has seen dermatology in the past.  She relates that she has had a basal cell carcinoma removed from the ankle of the right foot in the past.    Objective  Data Unavailable Data Unavailable Data Unavailable Data Unavailable Data Unavailable 0 lbs 0 oz      Non-palpable DP and PT pulses BL.   Equinus noted BL. Pes planus with rigid toe deformities noted to lesser digits on the right. Left AKA noted.   Nails are thickened, discolored, elongated, with subungual debris consistent with onychomycosis.    Lesion on the dorsal foot as pictured above. Scaling lesion on a red base. No open lesion associated. No bleeding. No pain to the area.         Assessment: DM2 with left AKA and neuropathy - presenting for a diabetic foot exam.     Plan:   - Pt seen and evaluated  -  Nails debrided x 5.  - Rx for diabetic shoes.   - For the skin lesion, recommend dermatology follow up. She will get this scheduled ASAP. She has a hx of BCC of the medial right ankle.  - Pt to return to clinic in 3 months or sooner if problems arise.       Again, thank you for allowing me to participate in the care of your patient.      Sincerely,    Eleazar Pack DPM

## 2018-12-07 NOTE — PROGRESS NOTES
SUBJECTIVE/OBJECTIVE:                Supriya Herr is a 68 year old female coming in for a follow up appointment.  She as referred from Nephrology.     Chief Complaint: HTN    Allergies/ADRs: None  Tobacco: 0-1 pack per day - is not interested in quitting sneaks in a cigarette in here and there, but unable to smoke where she is staying.   Alcohol: not currently using  PMH: Reviewed in Epic    Medication Adherence/Access:  Patient uses pill box(es) and has assistance with medication administration: family member, daughter.  Patient takes medications 2 time(s) per day.    Hypertension: Current medications include Carvedilol 12.5mg BID, Furosemide 60mg qAM and 40mg qPM(does not weigh at home due to being in wheel chair), minimal edema in leg, not wearing compression stockings until infection fully heals per patient.  Patient reports no current medication side effects.   Home BPs: 150/80's, 155/45    Vitals today 3 test average: 141/71    Current labs include:   BP Readings from Last 3 Encounters:   12/07/18 131/71   11/30/18 138/59   11/15/18 160/64     ASSESSMENT:                 Current medications were reviewed today.      Medication Adherence: good, no issues identified    Hypertension: Needs improvement. BP at goal <140/90, near goal 130/80. Creatinine has increased since last month, likely due to the increase in diuretics. Could consider using an ARB, potassium is WNL, but would have to monitor closely. Patient is seeing Ethel Geurrero today.       PLAN:                Continue current therapy. BP fairly well controlled today, could consider addition of low dose ARB as discussed previously with Dr. Basilio. Patient seeing Ethel Guerrero today.     I spent 15 minutes with this patient today. I offer these suggestions for consideration by the PCP. A copy of the visit note was provided to the patient's primary care provider.    Will follow up in 2-3 months.    The patient declined a summary of these recommendations as  an after visit summary.    Brenden Blackwell, PharmD  [facility] Pharmacist    Phone: [phone]

## 2018-12-07 NOTE — MR AVS SNAPSHOT
After Visit Summary   12/7/2018    Supriya Herr    MRN: [id number]           Patient Information     Date Of Birth          1950        Visit Information        Provider Department      12/7/2018 3:00 PM [facility]        Today's Diagnoses     Hypertension secondary to other renal disorders    -  1       Follow-ups after your visit        Your next 10 appointments already scheduled     Jan 22, 2019  3:20 PM CST   CT CHEST W/O CONTRAST with UCCT2   [facility] ([facility])    [address] [address] [phone]           How do I prepare for my exam? (Food and drink instructions) No Food and Drink Restrictions.  How do I prepare for my exam? (Other instructions) You do not need to do anything special to prepare for this exam. For a sinus scan: Use your nose spray (nasal decongestant spray) as directed.  What should I wear: Please wear loose clothing, such as a sweat suit or jogging clothes. Avoid snaps, zippers and other metal. We may ask you to undress and put on a hospital gown.  How long does the exam take: Most scans take less than 20 minutes.  What should I bring: Please bring any scans or X-rays taken at other hospitals, if similar tests were done. Also bring a list of your medicines, including vitamins, minerals and over-the-counter drugs. It is safest to leave personal items at home.  Do I need a : No  is needed.  What do I need to tell my doctor? Be sure to tell your doctor: * If you have any allergies. * If there s any chance you are pregnant. * If you are breastfeeding.  What should I do after the exam: No restrictions, You may resume normal activities.  What is this test: A CT (computed tomography) scan is a series of pictures that allows us to look inside your body. The scanner creates images of the body in cross sections, much like  slices of bread. This helps us see any problems more clearly.  Who should I call with questions: If you have any questions, please call the Imaging Department where you will have your exam. Directions, parking instructions, and other information is available on our website, [email or web address].            Jan 22, 2019  4:30 PM CST   (Arrive by 4:15 PM)   Return Visit with Ravin King MD   [facility] ([facility])    [address]  [address]  [facility] [address] [phone]            Feb 08, 2019 11:00 AM CST   (Arrive by 10:45 AM)   RETURN DIABETES with Arabella Kamara PA-C   [facility] ([facility])    [address]  3rd Floor  [facility] [place] [facility] [address] [phone]              Who to contact     If you have questions or need follow up information about today's clinic visit or your schedule please contact [facility] MEDICATION THERAPY MANAGEMENT directly at [phone].  Normal or non-critical lab and imaging results will be communicated to you by MyChart, letter or phone within 4 business days after the clinic has received the results. If you do not hear from us within 7 days, please contact the clinic through In Flowhart or phone. If you have a critical or abnormal lab result, we will notify you by phone as soon as possible.  Submit refill requests through [employer] or call your pharmacy and they will forward the refill request to us. Please allow 3 business days for your refill to be completed.          Additional Information About Your Visit        In Flowhart Information     [employer] gives you secure access to your electronic health record. If you see a primary care provider, you can also send messages to your care team and make appointments. If you have questions, please call your primary care clinic.  If you do not have a primary care provider, please call [phone] and they will assist you.         Care EveryWhere ID     This is your Care EveryWhere ID. This could be used by other organizations to access your [facility] medical records  [id number]         Blood Pressure from Last 3 Encounters:   12/07/18 131/71   12/07/18 131/71   11/30/18 138/59    Weight from Last 3 Encounters:   11/06/18 190 lb 6.4 oz (86.4 kg)   11/01/18 191 lb (86.6 kg)   10/24/18 186 lb (84.4 kg)              Today, you had the following     No orders found for display         Today's Medication Changes          These changes are accurate as of 12/7/18  3:44 PM.  If you have any questions, ask your nurse or doctor.               These medicines have changed or have updated prescriptions.        Dose/Directions    insulin glargine 100 UNIT/ML pen   This may have changed:  additional instructions   Used for:  Type 2 diabetes mellitus with diabetic neuropathy, with long-term current use of insulin (H)        Dose:  20 Units   Inject 20 Units Subcutaneous At Bedtime Inject 22 U SubCut at HS   Quantity:  3 mL   Refills:  11                Primary Care Provider Office Phone # Fax #    Noam Luis Jackson [phone]       [address] [facility] [address]  [facility] [address]        Equal Access to Services     KALYANI WARREN : Emeterio Lam, waulyssesda lizzette, qaybta kaalmada adetereyada, madeline chaudhari. So [facility] [phone].    ATENCIÓN: Si habla español, tiene a santoro disposición servicios gratuitos de asistencia lingüística. Llame al [phone].    We comply with applicable federal civil rights laws and [place] laws. We do not discriminate on the basis of race, color, national origin, age, disability, sex, sexual orientation, or gender identity.            Thank you!     Thank you for choosing [facility] MEDICATION THERAPY MANAGEMENT  for your care. Our goal is always to provide you with excellent care. Hearing back from our patients is one way we can continue to improve our services.  Please take a few minutes to complete the written survey that you may receive in the mail after your visit with us. Thank you!             Your Updated Medication List - Protect others around you: Learn how to safely use, store and throw away your medicines at www.disposemymeds.org.          This list is accurate as of 12/7/18  3:44 PM.  Always use your most recent med list.                   Brand Name Dispense Instructions for use Diagnosis    acetaminophen 325 MG tablet    TYLENOL    100 tablet    Take 2 tablets (650 mg) by mouth every 4 hours as needed for mild pain    Diabetic ulcer of toe of right foot associated with type 2 diabetes mellitus, with other ulcer severity (H)       albuterol 108 (90 Base) MCG/ACT inhaler    PROAIR HFA/PROVENTIL HFA/VENTOLIN HFA    3 Inhaler    Inhale 2 puffs into the lungs every 6 hours as needed for shortness of breath / dyspnea or wheezing    Cough       atorvastatin 20 MG tablet    LIPITOR    90 tablet    TAKE 1 TABLET BY MOUTH ONCE DAILY    Hyperlipidemia LDL goal <100       calcitRIOL 0.25 MCG capsule    ROCALTROL    90 capsule    TAKE 1 CAPSULE (0.25 MCG) BY MOUTH DAILY    Vitamin D deficiency       carvedilol 12.5 MG tablet    COREG    60 tablet    Take 1 tablet (12.5 mg) by mouth 2 times daily (with meals)    Essential hypertension with goal blood pressure less than 140/90       clindamycin 1 % external gel    CLINDAMAX     Apply topically 2 times daily        emollient external cream      Apply topically 2 times daily        famotidine 20 MG tablet    PEPCID    60 tablet    Take 1 tablet (20 mg) by mouth daily    Right foot pain       furosemide 40 MG tablet    LASIX    60 tablet    Take 1 tablet (40 mg) by mouth 2 times daily    CKD (chronic kidney disease) stage 5, GFR less than 15 ml/min (H), Type 2 diabetes mellitus with stage 4 chronic kidney disease, with long-term current use of insulin (H), Anemia due to stage 5 chronic kidney disease, 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 (H)        insulin glargine 100 UNIT/ML pen     3 mL    Inject 20 Units Subcutaneous At Bedtime Inject 22 U SubCut at HS    Type 2 diabetes mellitus with diabetic neuropathy, with long-term current use of insulin (H)       insulin pen needle 31G X 6 MM miscellaneous    ULTICARE MINI    100 each    Use daily or as directed.    Type 2 diabetes, HbA1c goal < 7% (H)       NovoLOG FLEXPEN 100 UNIT/ML pen   Generic drug:  insulin aspart     15 mL    4 Units Inject 4 units SQ with breakfast, lunch and dinner. IF SNACK BETWEEN LUNCH AND DINNER SHE TAKES AN EXTRA 2 UNITS    Type 2 diabetes mellitus with hyperglycemia, with long-term current use of insulin (H)       ONETOUCH ULTRA test strip   Generic drug:  blood glucose monitoring     400 strip    TEST YOUR BLOOD SUGAR 3-4 TIMES PER DAY.    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    Dermatitis       vitamin D3 2000 units tablet    CHOLECALCIFEROL    100 tablet    Take 2,000 Units by mouth daily    Vitamin D deficiency       * Notice:  This list has 3 medication(s) that are the same as other medications prescribed for you. Read the directions carefully, and ask your doctor or other care provider to review them with you.

## 2018-12-07 NOTE — NURSING NOTE
Reason For Visit:   Chief Complaint   Patient presents with     RECHECK     diabetic nail care       There were no vitals taken for this visit.    Pain Assessment  Patient Currently in Pain: Mariano Lu, ATC

## 2018-12-07 NOTE — LETTER
12/7/2018      RE: Supriya Herr  [address]  [facility] [address]       Nephrology Clinic Visit 12/7/18    Assessment and Plan:    1. CKD5 w/proteinuria- Creat 3.9, eGFR 11 ml/mn, UPCR 5.6 g/gCr. No uremic symptoms   - Etiology of CKD felt to be DM, HTN, Vascular dz,    - Active on transplant list   - Will need AVG when time comes to initiate HD   - No need to initiate at this time, no uremic symptoms, lytes, bicarb fine   - Does not use NSAIDs   - Blood pressures at goal   - DM with excellent control. A1c 5.4% in Nov 2018   - On statin for CV risk reduction    2. Volume status - Euvolemic. No edema. Blood pressures at goal. No current weight given w/c.   Remains on Lasix 60 mg am, 40 mg pm    3. HTN - Well controlled. Blood pressures 130-140/ on the following regimen:    - lasix 60/40   - Coreg 12.5 mg bid   - Continue home monitoring   - Blood pressure goal < 140/80    4. Electrolytes - No acute concerns. K 4.3, Na 143    5. Acid base - No acute concerns. Bicarb 25    6. BMD - Ca corrected 9.3, Phos 4.8, Albumin 2.7   - Vit D 27 (10/18)   -  (10/18)   - On Calcitriol and Cholecalciferol    7. Anemia - Hgb 10.3   - Iron studies 12/18: Ferritin 348, Fe 42, IS 18   - Receives intermit IV iron infusions   - No need for KELLIE at this time   - Colonoscopy 2018, tubular adenomas    8. DM2 - Well controlled. A1c 5.4%. On Insulin     9. Disposition - RTC 6 wks for f/u    Assessment and plan was discussed with patient and she voiced her understanding and agreement.    Reason for Visit:  CKD5/HTN f/u    HPI:  Ms Herr is a 69 yo female with CKD5 and nephrotic range proteinuria, DM2, HTN, in today for routine CKD f/u. Last seen by Dr Basilio on 11/1/18. Furosemide was increased to 60 mg AM/40 mg PM with improvement in edema. Baseline creat in the 3-4 range since 11/17    Interval Hx:   No hospital admissions    ROS:   Feeling well. Working with PT/OT at home. Has good appetite. Blood sugars generally in  target. Home blood pressures in the 130-140/ range. Denies dyspnea, chest pain, abdominal pain. No bowel or bladder concerns. Doesn't yet have prosthesis.     Chronic Health Problems:    CKD5  Proteinuria  AKA, left (10/18)  T2DM  Vit D def  HTN  HLD  Anxiety/depression  Vitiligo  Non proliferative diabetic retinopathy  BCC  JONE  Anemia  Prior tobacco abuse    Family Hx:   Family History   Problem Relation Age of Onset     Diabetes Mother      Hypertension Mother      Heart Disease Mother          of congestive heart failure     Eye Disorder Mother      Arthritis Mother      Obesity Mother      Heart Disease Father          from CHF     Cerebrovascular Disease Father      Arthritis Father      Musculoskeletal Disorder Other         has MS     Thyroid Disease Other      Eye Disorder Other         cataracts     Cancer Other         throat/liver     Skin Cancer No family hx of      Melanoma No family hx of      Glaucoma No family hx of      Macular Degeneration No family hx of      Personal Hx:   Lives in lower level of duplex, daughter upper level.   Social History     Tobacco Use     Smoking status: Light Tobacco Smoker     Packs/day: 0.50     Years: 52.00     Pack years: 26.00     Types: Cigarettes     Start date: 1964     Last attempt to quit: 2017     Years since quittin.1     Smokeless tobacco: Never Used     Tobacco comment: 1 per day or less   Substance Use Topics     Alcohol use: No       Allergies:  Allergies   Allergen Reactions     Penicillins Rash     Unasyn Rash     No evidence SJS, but very uncomfortable and precipitated multiple provider visits. Would not use penicillins again if other options available.        Medications:  Current Outpatient Medications   Medication Sig     acetaminophen (TYLENOL) 325 MG tablet Take 2 tablets (650 mg) by mouth every 4 hours as needed for mild pain     albuterol (PROAIR HFA, PROVENTIL HFA, VENTOLIN HFA) 108 (90 BASE) MCG/ACT inhaler Inhale 2 puffs  into the lungs every 6 hours as needed for shortness of breath / dyspnea or wheezing     atorvastatin (LIPITOR) 20 MG tablet TAKE 1 TABLET BY MOUTH ONCE DAILY     BASAGLAR 100 UNIT/ML injection Inject 20 Units Subcutaneous At Bedtime Inject 22 U SubCut at HS (Patient taking differently: Inject 20 Units Subcutaneous At Bedtime )     calcitRIOL (ROCALTROL) 0.25 MCG capsule TAKE 1 CAPSULE (0.25 MCG) BY MOUTH DAILY     carvedilol (COREG) 12.5 MG tablet Take 1 tablet (12.5 mg) by mouth 2 times daily (with meals)     Cholecalciferol (VITAMIN D) 2000 units tablet Take 2,000 Units by mouth daily     clindamycin (CLINDAMAX) 1 % topical gel Apply topically 2 times daily     emollient (VANICREAM) cream Apply topically 2 times daily     famotidine (PEPCID) 20 MG tablet Take 1 tablet (20 mg) by mouth daily (Patient not taking: Reported on 11/1/2018)     furosemide (LASIX) 40 MG tablet Take 1 tablet (40 mg) by mouth 2 times daily (Patient taking differently: Take 40 mg by mouth 2 times daily )     insulin pen needle (ULTICARE MINI) 31G X 6 MM Use daily or as directed.     NOVOLOG FLEXPEN 100 UNIT/ML soln 4 Units Inject 4 units SQ with breakfast, lunch and dinner. IF SNACK BETWEEN LUNCH AND DINNER SHE TAKES AN EXTRA 2 UNITS     ONETOUCH ULTRA test strip TEST YOUR BLOOD SUGAR 3-4 TIMES PER DAY.     order for DME Equipment being ordered: toilet riser, lifetime need  DX amputation of leg above the knee, S78.119     order for DME 1 SAD light     order for DME Equipment being ordered: Right Lower extremity Solaris Ready wrap calf piece:  Size small/length tall , knee piece: Size small , Thigh piece size small/length average.     order for DME 1 wheelchair     order for DME Equipment being ordered: TEDS stocking   Below the knee 15-20 mg  Dispense 2  Use daily     ORDER FOR DME Equipment being ordered: Compression stockings, 20-30 MMHG, knee high     sertraline (ZOLOFT) 25 MG tablet TAKE 1 TABLET (25 MG) BY MOUTH DAILY     triamcinolone  (KENALOG) 0.1 % cream Apply to sites of dermatitis- the abdomen, armpits, groin as needed.     No current facility-administered medications for this visit.       Vitals:  /71 , 131/71, 145/71    Exam:  GEN: Pleasant female in NAD. Sister present  CARDIAC: RRR  LUNGS: CTA  ABDOMEN: Soft NT  EXT: Left LORI, Right no edema  NEURO: alert. Oriented. No asterixis    LABS:   CMP  Recent Labs   Lab Test 12/07/18  1411 11/01/18  1530 10/17/18  1413 09/25/18  1553    146* 143 144   POTASSIUM 4.3 3.8 4.5 4.3   CHLORIDE 111* 114* 113* 114*   CO2 25 23 23 23   ANIONGAP 7 9 7 7   * 131* 136* 155*   BUN 74* 53* 66* 70*   CR 3.99* 3.17* 3.37* 3.02*   GFRESTIMATED 11* 14* 14* 15*   GFRESTBLACK 13* 18* 16* 19*   JODY 8.3* 8.6 8.2* 8.4*     Recent Labs   Lab Test 07/31/18  1148 07/24/18  0900 07/17/18  0830 06/24/18  0623 06/23/18  2347 03/29/18  1410 11/13/17  0715   BILITOTAL  --   --   --  0.2 0.2 0.2 0.3   ALKPHOS  --   --   --  49 56 56 70   ALT  --   --   --  17 13 15 37   AST 24 24 12 16 12 14 30     CBC  Recent Labs   Lab Test 12/07/18  1411 11/01/18  1530 10/17/18  1413 09/13/18  1559 07/31/18  1148 07/24/18  0900  07/17/18  0830   HGB 10.3* 9.0* 9.0* 9.7* 9.3* 9.7*   < > 9.5*   WBC 5.7  --   --   --  5.1 6.5  --  7.6   RBC 3.47*  --   --   --  3.09* 3.25*  --  3.18*   HCT 32.3*  --   --   --  29.1* 30.1*  --  29.3*   MCV 93  --   --   --  94 93  --  92   MCH 29.7  --   --   --  30.1 29.8  --  29.9   MCHC 31.9  --   --   --  32.0 32.2  --  32.4   RDW 12.4  --   --   --  12.8 12.7  --  12.7     --   --   --  246 278  --  289    < > = values in this interval not displayed.     URINE STUDIES  Recent Labs   Lab Test 03/29/18  1448 01/25/18  0233 11/02/17  0602 10/26/16  1220  12/05/12  1541   COLOR Straw Light Yellow Light Yellow Yellow   < > Yellow   APPEARANCE Clear Clear Clear Slightly Cloudy   < > Clear   URINEGLC 50* Negative 300* >500*   < > 100*   URINEBILI Negative Negative Negative Negative   < >  Negative   URINEKETONE Negative Negative Negative Negative   < > Negative   SG 1.008 1.007 1.010 1.014   < > 1.025   UBLD Negative Negative Negative Negative   < > Small*   URINEPH 5.0 6.5 7.5* 6.0   < > 6.0   PROTEIN >499* 100* >600* >500*   < > 100*   UROBILINOGEN  --   --   --   --   --  0.2   NITRITE Negative Negative Negative Negative   < > Negative   LEUKEST Negative Negative Small* Small*   < > Negative   RBCU 1 0 1 5*   < >  --    WBCU 2 1 44* 54*   < >  --     < > = values in this interval not displayed.     Recent Labs   Lab Test 12/07/18  1417 11/01/18  1533 05/24/18  1441 04/11/18  1025   UTPG 5.60* 6.71* 5.97* 4.98*     PTH  Recent Labs   Lab Test 10/17/18  1413 05/24/18  1435 02/21/18  1108   PTHI 127* 55 38     IRON STUDIES  Recent Labs   Lab Test 12/07/18  1411 07/18/18  1417 03/29/18  1410   IRON 42 76 27*    196* 281   IRONSAT 18 39 9*   ELENA 348* 450* 105       Silva Guerrero, NP

## 2018-12-07 NOTE — PROGRESS NOTES
Chief Complaint:   Chief Complaint   Patient presents with     RECHECK     diabetic nail care          Allergies   Allergen Reactions     Penicillins Rash     Unasyn Rash     No evidence SJS, but very uncomfortable and precipitated multiple provider visits. Would not use penicillins again if other options available.          Subjective: Supriya is a 68 year old female who presents to the clinic today for a diabetic foot exam and management. Since the last visit, she did have the second met head on the right foot resected.  She relates that this is healed well.  However, she does keep a Band-Aid on the surgical incision.  She was if she is not having any pain in the foot.  She relates that on the top of the right foot, she has noticed a new lesion that is formed.  She does not how long it has been there for.  She relates that it does not hurt.  This area does not bleed.  She has seen dermatology in the past.  She relates that she has had a basal cell carcinoma removed from the ankle of the right foot in the past.    Objective  Data Unavailable Data Unavailable Data Unavailable Data Unavailable Data Unavailable 0 lbs 0 oz      Non-palpable DP and PT pulses BL.   Equinus noted BL. Pes planus with rigid toe deformities noted to lesser digits on the right. Left AKA noted.   Nails are thickened, discolored, elongated, with subungual debris consistent with onychomycosis.    Lesion on the dorsal foot as pictured above. Scaling lesion on a red base. No open lesion associated. No bleeding. No pain to the area.         Assessment: DM2 with left AKA and neuropathy - presenting for a diabetic foot exam.     Plan:   - Pt seen and evaluated  - Nails debrided x 5.  - Rx for diabetic shoes.   - For the skin lesion, recommend dermatology follow up. She will get this scheduled ASAP. She has a hx of BCC of the medial right ankle.  - Pt to return to clinic in 3 months or sooner if problems arise.

## 2018-12-10 NOTE — PROGRESS NOTES
Nephrology Clinic Visit 12/7/18    Assessment and Plan:    1. CKD5 w/proteinuria- Creat 3.9, eGFR 11 ml/mn, UPCR 5.6 g/gCr. No uremic symptoms   - Etiology of CKD felt to be DM, HTN, Vascular dz,    - Active on transplant list   - Will need AVG when time comes to initiate HD   - No need to initiate at this time, no uremic symptoms, lytes, bicarb fine   - Does not use NSAIDs   - Blood pressures at goal   - DM with excellent control. A1c 5.4% in Nov 2018   - On statin for CV risk reduction    2. Volume status - Euvolemic. No edema. Blood pressures at goal. No current weight given w/c.   Remains on Lasix 60 mg am, 40 mg pm    3. HTN - Well controlled. Blood pressures 130-140/ on the following regimen:    - lasix 60/40   - Coreg 12.5 mg bid   - Continue home monitoring   - Blood pressure goal < 140/80    4. Electrolytes - No acute concerns. K 4.3, Na 143    5. Acid base - No acute concerns. Bicarb 25    6. BMD - Ca corrected 9.3, Phos 4.8, Albumin 2.7   - Vit D 27 (10/18)   -  (10/18)   - On Calcitriol and Cholecalciferol    7. Anemia - Hgb 10.3   - Iron studies 12/18: Ferritin 348, Fe 42, IS 18   - Receives intermit IV iron infusions   - No need for KELLIE at this time   - Colonoscopy 2018, tubular adenomas    8. DM2 - Well controlled. A1c 5.4%. On Insulin     9. Disposition - RTC 6 wks for f/u    Assessment and plan was discussed with patient and she voiced her understanding and agreement.    Reason for Visit:  CKD5/HTN f/u    HPI:  Ms Herr is a 67 yo female with CKD5 and nephrotic range proteinuria, DM2, HTN, in today for routine CKD f/u. Last seen by Dr Basilio on 11/1/18. Furosemide was increased to 60 mg AM/40 mg PM with improvement in edema. Baseline creat in the 3-4 range since 11/17    Interval Hx:   No hospital admissions    ROS:   Feeling well. Working with PT/OT at home. Has good appetite. Blood sugars generally in target. Home blood pressures in the 130-140/ range. Denies dyspnea, chest pain,  abdominal pain. No bowel or bladder concerns. Doesn't yet have prosthesis.     Chronic Health Problems:    CKD5  Proteinuria  AKA, left (10/18)  T2DM  Vit D def  HTN  HLD  Anxiety/depression  Vitiligo  Non proliferative diabetic retinopathy  BCC  JONE  Anemia  Prior tobacco abuse    Family Hx:   Family History   Problem Relation Age of Onset     Diabetes Mother      Hypertension Mother      Heart Disease Mother          of congestive heart failure     Eye Disorder Mother      Arthritis Mother      Obesity Mother      Heart Disease Father          from CHF     Cerebrovascular Disease Father      Arthritis Father      Musculoskeletal Disorder Other         has MS     Thyroid Disease Other      Eye Disorder Other         cataracts     Cancer Other         throat/liver     Skin Cancer No family hx of      Melanoma No family hx of      Glaucoma No family hx of      Macular Degeneration No family hx of      Personal Hx:   Lives in lower level of duplex, daughter upper level.   Social History     Tobacco Use     Smoking status: Light Tobacco Smoker     Packs/day: 0.50     Years: 52.00     Pack years: 26.00     Types: Cigarettes     Start date: 1964     Last attempt to quit: 2017     Years since quittin.1     Smokeless tobacco: Never Used     Tobacco comment: 1 per day or less   Substance Use Topics     Alcohol use: No       Allergies:  Allergies   Allergen Reactions     Penicillins Rash     Unasyn Rash     No evidence SJS, but very uncomfortable and precipitated multiple provider visits. Would not use penicillins again if other options available.        Medications:  Current Outpatient Medications   Medication Sig     acetaminophen (TYLENOL) 325 MG tablet Take 2 tablets (650 mg) by mouth every 4 hours as needed for mild pain     albuterol (PROAIR HFA, PROVENTIL HFA, VENTOLIN HFA) 108 (90 BASE) MCG/ACT inhaler Inhale 2 puffs into the lungs every 6 hours as needed for shortness of breath / dyspnea or  wheezing     atorvastatin (LIPITOR) 20 MG tablet TAKE 1 TABLET BY MOUTH ONCE DAILY     BASAGLAR 100 UNIT/ML injection Inject 20 Units Subcutaneous At Bedtime Inject 22 U SubCut at HS (Patient taking differently: Inject 20 Units Subcutaneous At Bedtime )     calcitRIOL (ROCALTROL) 0.25 MCG capsule TAKE 1 CAPSULE (0.25 MCG) BY MOUTH DAILY     carvedilol (COREG) 12.5 MG tablet Take 1 tablet (12.5 mg) by mouth 2 times daily (with meals)     Cholecalciferol (VITAMIN D) 2000 units tablet Take 2,000 Units by mouth daily     clindamycin (CLINDAMAX) 1 % topical gel Apply topically 2 times daily     emollient (VANICREAM) cream Apply topically 2 times daily     famotidine (PEPCID) 20 MG tablet Take 1 tablet (20 mg) by mouth daily (Patient not taking: Reported on 11/1/2018)     furosemide (LASIX) 40 MG tablet Take 1 tablet (40 mg) by mouth 2 times daily (Patient taking differently: Take 40 mg by mouth 2 times daily )     insulin pen needle (ULTICARE MINI) 31G X 6 MM Use daily or as directed.     NOVOLOG FLEXPEN 100 UNIT/ML soln 4 Units Inject 4 units SQ with breakfast, lunch and dinner. IF SNACK BETWEEN LUNCH AND DINNER SHE TAKES AN EXTRA 2 UNITS     ONETOUCH ULTRA test strip TEST YOUR BLOOD SUGAR 3-4 TIMES PER DAY.     order for DME Equipment being ordered: toilet riser, lifetime need  DX amputation of leg above the knee, S78.119     order for DME 1 SAD light     order for DME Equipment being ordered: Right Lower extremity Solaris Ready wrap calf piece:  Size small/length tall , knee piece: Size small , Thigh piece size small/length average.     order for DME 1 wheelchair     order for DME Equipment being ordered: TEDS stocking   Below the knee 15-20 mg  Dispense 2  Use daily     ORDER FOR DME Equipment being ordered: Compression stockings, 20-30 MMHG, knee high     sertraline (ZOLOFT) 25 MG tablet TAKE 1 TABLET (25 MG) BY MOUTH DAILY     triamcinolone (KENALOG) 0.1 % cream Apply to sites of dermatitis- the abdomen, armpits,  groin as needed.     No current facility-administered medications for this visit.       Vitals:  /71 , 131/71, 145/71    Exam:  GEN: Pleasant female in NAD. Sister present  CARDIAC: RRR  LUNGS: CTA  ABDOMEN: Soft NT  EXT: Left LORI, Right no edema  NEURO: alert. Oriented. No asterixis    LABS:   CMP  Recent Labs   Lab Test 12/07/18  1411 11/01/18  1530 10/17/18  1413 09/25/18  1553    146* 143 144   POTASSIUM 4.3 3.8 4.5 4.3   CHLORIDE 111* 114* 113* 114*   CO2 25 23 23 23   ANIONGAP 7 9 7 7   * 131* 136* 155*   BUN 74* 53* 66* 70*   CR 3.99* 3.17* 3.37* 3.02*   GFRESTIMATED 11* 14* 14* 15*   GFRESTBLACK 13* 18* 16* 19*   JODY 8.3* 8.6 8.2* 8.4*     Recent Labs   Lab Test 07/31/18  1148 07/24/18  0900 07/17/18  0830 06/24/18  0623 06/23/18  2347 03/29/18  1410 11/13/17  0715   BILITOTAL  --   --   --  0.2 0.2 0.2 0.3   ALKPHOS  --   --   --  49 56 56 70   ALT  --   --   --  17 13 15 37   AST 24 24 12 16 12 14 30     CBC  Recent Labs   Lab Test 12/07/18  1411 11/01/18  1530 10/17/18  1413 09/13/18  1559 07/31/18  1148 07/24/18  0900  07/17/18  0830   HGB 10.3* 9.0* 9.0* 9.7* 9.3* 9.7*   < > 9.5*   WBC 5.7  --   --   --  5.1 6.5  --  7.6   RBC 3.47*  --   --   --  3.09* 3.25*  --  3.18*   HCT 32.3*  --   --   --  29.1* 30.1*  --  29.3*   MCV 93  --   --   --  94 93  --  92   MCH 29.7  --   --   --  30.1 29.8  --  29.9   MCHC 31.9  --   --   --  32.0 32.2  --  32.4   RDW 12.4  --   --   --  12.8 12.7  --  12.7     --   --   --  246 278  --  289    < > = values in this interval not displayed.     URINE STUDIES  Recent Labs   Lab Test 03/29/18  1448 01/25/18  0233 11/02/17  0602 10/26/16  1220  12/05/12  1541   COLOR Straw Light Yellow Light Yellow Yellow   < > Yellow   APPEARANCE Clear Clear Clear Slightly Cloudy   < > Clear   URINEGLC 50* Negative 300* >500*   < > 100*   URINEBILI Negative Negative Negative Negative   < > Negative   URINEKETONE Negative Negative Negative Negative   < > Negative    SG 1.008 1.007 1.010 1.014   < > 1.025   UBLD Negative Negative Negative Negative   < > Small*   URINEPH 5.0 6.5 7.5* 6.0   < > 6.0   PROTEIN >499* 100* >600* >500*   < > 100*   UROBILINOGEN  --   --   --   --   --  0.2   NITRITE Negative Negative Negative Negative   < > Negative   LEUKEST Negative Negative Small* Small*   < > Negative   RBCU 1 0 1 5*   < >  --    WBCU 2 1 44* 54*   < >  --     < > = values in this interval not displayed.     Recent Labs   Lab Test 12/07/18  1417 11/01/18  1533 05/24/18  1441 04/11/18  1025   UTPG 5.60* 6.71* 5.97* 4.98*     PTH  Recent Labs   Lab Test 10/17/18  1413 05/24/18  1435 02/21/18  1108   PTHI 127* 55 38     IRON STUDIES  Recent Labs   Lab Test 12/07/18  1411 07/18/18  1417 03/29/18  1410   IRON 42 76 27*    196* 281   IRONSAT 18 39 9*   ELENA 348* 450* 105       Silva Guerrero, NP

## 2018-12-12 ENCOUNTER — PATIENT OUTREACH (OUTPATIENT)
Dept: CARE COORDINATION | Facility: CLINIC | Age: 68
End: 2018-12-12

## 2018-12-12 DIAGNOSIS — E11.628 DIABETIC FOOT INFECTION (H): Primary | ICD-10-CM

## 2018-12-12 DIAGNOSIS — L08.9 DIABETIC FOOT INFECTION (H): Primary | ICD-10-CM

## 2018-12-12 DIAGNOSIS — Z79.4 TYPE 2 DIABETES MELLITUS WITH HYPERGLYCEMIA, WITH LONG-TERM CURRENT USE OF INSULIN (H): ICD-10-CM

## 2018-12-12 DIAGNOSIS — E11.65 TYPE 2 DIABETES MELLITUS WITH HYPERGLYCEMIA, WITH LONG-TERM CURRENT USE OF INSULIN (H): ICD-10-CM

## 2018-12-12 NOTE — TELEPHONE ENCOUNTER
NOVOLOG 100 UNITS/ML FLEXPEN       Inject 2 units SQ with breakfast, lunch and dinner.      Medication not active on medlist    Last Office Visit : 11-8-18  Future Office visit:  2-8-19    Routing refill request to provider for review/approval because:  Insulin - refilled per clinic  Requested dosage not on med list- was  discontinue 12-15-17  Current Rx med/dosage listed as historical  ? Current dosage or what pharmacy has file.

## 2018-12-12 NOTE — PROGRESS NOTES
Clinic Care Coordination Contact  Care Team Conversations    Received copy of nephrology office visit note. Left msg for daughter to call back for pro-active outreach and follow up plan from visit.     Carleen Valdes R.N.  Clinic Care Coordinator  [facility]  [phone]

## 2018-12-14 ENCOUNTER — OFFICE VISIT (OUTPATIENT)
Dept: DERMATOLOGY | Facility: CLINIC | Age: 68
End: 2018-12-14
Attending: PODIATRIST
Payer: MEDICARE

## 2018-12-14 DIAGNOSIS — Z53.9 DIAGNOSIS NOT YET DEFINED: Primary | ICD-10-CM

## 2018-12-14 DIAGNOSIS — L40.9 PSORIASIS: Primary | ICD-10-CM

## 2018-12-14 PROCEDURE — G0180 MD CERTIFICATION HHA PATIENT: HCPCS | Performed by: FAMILY MEDICINE

## 2018-12-14 RX ORDER — TRIAMCINOLONE ACETONIDE 1 MG/G
OINTMENT TOPICAL
Qty: 80 G | Refills: 3 | Status: SHIPPED | OUTPATIENT
Start: 2018-12-14 | End: 2019-02-15

## 2018-12-14 ASSESSMENT — PAIN SCALES - GENERAL: PAINLEVEL: NO PAIN (0)

## 2018-12-14 NOTE — PATIENT INSTRUCTIONS
Continue using triamcinolone 0.1% ointment on any pink scaly spots on the upper arms, forearms, under the breasts, and foot twice a day for 1 month.     Follow up in 2 months.
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
## 2018-12-14 NOTE — NURSING NOTE
"Chief Complaint   Patient presents with     Derm Problem     Supriya is here today for a lesion on her right foot. She states \" I have a white spot with red on my right foot not sure how long it has been there\"     Raina Juarez, RMA  "

## 2018-12-21 ENCOUNTER — TELEPHONE (OUTPATIENT)
Dept: FAMILY MEDICINE | Facility: CLINIC | Age: 68
End: 2018-12-21

## 2018-12-21 DIAGNOSIS — E11.65 TYPE 2 DIABETES MELLITUS WITH HYPERGLYCEMIA, WITH LONG-TERM CURRENT USE OF INSULIN (H): ICD-10-CM

## 2018-12-21 DIAGNOSIS — Z79.4 TYPE 2 DIABETES MELLITUS WITH HYPERGLYCEMIA, WITH LONG-TERM CURRENT USE OF INSULIN (H): ICD-10-CM

## 2018-12-21 NOTE — TELEPHONE ENCOUNTER
Called Carmelo with  HC, verbal orders given per protocol for home care visits x1/week and 1 PRN.       Per Carmelo, patient needs new script for test strips sent per protocol    LOV: 11/30/2018    Cheryl Law, RN  Triage Nurse

## 2018-12-21 NOTE — TELEPHONE ENCOUNTER
Reason for call:  Other   Patient called regarding (reason for call): call back  Additional comments: Carmelo with [facility] needs a verbal order for a once a week x1 and 1 prn home care visit for pt.    Phone number to reach patient:  Carmelo can be reached at [phone].  It is a confidential voicemail.      Best Time:  N/a      Can we leave a detailed message on this number?  YES

## 2018-12-22 ENCOUNTER — CONSENT FORM (OUTPATIENT)
Dept: FAMILY MEDICINE | Facility: CLINIC | Age: 68
End: 2018-12-22

## 2018-12-23 NOTE — PROGRESS NOTES
A&O x 4 VSS, LS clear. Neuros & CMS intact with baseline neuropathy numbness in all extremities. Pain well controlled; she didn't require pain medication this shift. BS + LBM yesterday. Voiding adeqately. Tolerating a regular/diabetic diet without issue. Denies CP or SOB. Foot wound PROSPER and redness subsiding since ED visit. Independent with w/c mobility.      "[facility] Hospital Medicine  Progress Note    Patient Name: Rigo Baez  MRN: [id number]  Patient Class: IP- Inpatient   Admission Date: 5/8/2017  Length of Stay: 1 days  Attending Physician: Alfredito Swift MD  Primary Care Provider: [facility] Medicine Team: [facility] MED F Tanya Fisher PA-C    Subjective:     Principal Problem:Localization-related (focal) (partial) symptomatic epilepsy and epileptic syndromes with complex partial seizures, intractable, without status epilepticus    HPI:  25 year old male with PMHx of seizure disorder presenting for EEG monitoring to characterize convulsions. Pt denies any other past medical history as well as tobacco use, alcohol abuse, and illicit drug use. Pt reports episodes began at age 20 and have always occurred during sleep. Pt does not recall the events. Pt endorses post-ictal confusion, fatigue following episodes, and tongue biting. Pt denies urinary and bowel incontinence. Pt reports he has not taken his keppra in over a month because "it didn't work." Pt reports last seizure occurred last night during sleep. Today, pt c/o myalgias and tongue soreness 2/2 event last night. Pt denies chest pain, SOB, abdominal pain, N/V/D, fever, chills, and recent sick contacts.    Hospital Course:  Pt admitted for EEG monitoring to characterize seizures  5/9: No seizures since admit. Sleep deprivation tonight, benadryl 50 mg and tramadol 100 gm tomorrow morning per EMU.      Interval History: Pt had no acute events overnight. This AM, pt is sitting upright in bed watching TV. Pt has no complaints at this time. Pt denies seizures since admit.    Review of Systems   Constitutional: Negative for chills, diaphoresis, fatigue and fever.   Respiratory: Negative for cough, chest tightness, shortness of breath and wheezing.    Cardiovascular: Negative for chest pain, palpitations and leg swelling.   Gastrointestinal: Negative for abdominal distention, " Family.../Staff... abdominal pain, diarrhea, nausea and vomiting.   Musculoskeletal: Positive for myalgias. Negative for back pain, gait problem, neck pain and neck stiffness.   Neurological: Positive for seizures. Negative for dizziness, syncope, weakness, light-headedness and headaches.     Objective:     Vital Signs (Most Recent):  Temp: 97.9 °F (36.6 °C) (05/09/17 0825)  Pulse: 62 (05/09/17 0825)  Resp: 16 (05/09/17 0825)  BP: 137/63 (05/09/17 0825)  SpO2: 97 % (05/09/17 0825) Vital Signs (24h Range):  Temp:  [97.7 °F (36.5 °C)-98.1 °F (36.7 °C)] 97.9 °F (36.6 °C)  Pulse:  [] 62  Resp:  [14-19] 16  SpO2:  [95 %-97 %] 97 %  BP: (113-137)/(56-78) 137/63     Weight: 99.8 kg (220 lb)  Body mass index is 34.46 kg/(m^2).    Intake/Output Summary (Last 24 hours) at 05/09/17 1128  Last data filed at 05/09/17 0820   Gross per 24 hour   Intake              460 ml   Output                0 ml   Net              460 ml      Physical Exam   Constitutional: He is oriented to person, place, and time. He appears well-developed and well-nourished. No distress.   Cardiovascular: Normal rate, regular rhythm, normal heart sounds and intact distal pulses.    No murmur heard.  Abdominal: Soft. Bowel sounds are normal. He exhibits no distension. There is no tenderness.   Musculoskeletal: Normal range of motion.   Neurological: He is alert and oriented to person, place, and time. No cranial nerve deficit.   Skin: Skin is warm and dry. He is not diaphoretic. No erythema.   Psychiatric: He has a normal mood and affect. His behavior is normal.       Significant Labs: All pertinent labs within the past 24 hours have been reviewed.    Significant Imaging: I have reviewed all pertinent imaging results/findings within the past 24 hours.    Assessment/Plan:      * Localization-related (focal) (partial) symptomatic epilepsy and epileptic syndromes with complex partial seizures, intractable, without status epilepticus  - Continuous EEG monitoring. EMU  consulted  - Keppra held on admit per EMU recs  - Seizure precautions and activations per protcol  5/9: No seizures since admit. Sleep deprivation tonight, benadryl 50 mg and tramadol 100 gm tomorrow morning per EMU.  - IV Valium PRN for GTC greater than 5 min - hospital medicine to call epilepsy on call before administering       VTE Risk Mitigation         Ordered     Low Risk of VTE  Once      05/08/17 1051          Tanya Fisher PA-C  Department of Hospital Medicine   [facility]

## 2018-12-28 ENCOUNTER — DOCUMENTATION ONLY (OUTPATIENT)
Dept: FAMILY MEDICINE | Facility: CLINIC | Age: 68
End: 2018-12-28

## 2018-12-28 DIAGNOSIS — N18.5 CKD (CHRONIC KIDNEY DISEASE) STAGE 5, GFR LESS THAN 15 ML/MIN (H): ICD-10-CM

## 2018-12-28 DIAGNOSIS — E11.40 TYPE 2 DIABETES MELLITUS WITH DIABETIC NEUROPATHY, WITH LONG-TERM CURRENT USE OF INSULIN (H): Primary | ICD-10-CM

## 2018-12-28 DIAGNOSIS — Z79.4 TYPE 2 DIABETES MELLITUS WITH DIABETIC NEUROPATHY, WITH LONG-TERM CURRENT USE OF INSULIN (H): Primary | ICD-10-CM

## 2018-12-28 DIAGNOSIS — E11.22 TYPE 2 DIABETES MELLITUS WITH DIABETIC CHRONIC KIDNEY DISEASE (H): ICD-10-CM

## 2018-12-28 DIAGNOSIS — Z99.3 WHEELCHAIR BOUND: ICD-10-CM

## 2018-12-28 NOTE — PROGRESS NOTES
Supriya Herr would benefit from a mattress overlay for her hospital bed. For insurance coverage she requires:  Notes from documentation during the visit 11/30/18  Detailed order:  OK for mattress overlay due to DM, kidney disease, immobility  Length of need 99 months/lifetime  Height 65  Weight 168 lbs    DME vendor is [employer]  Fax number [phone]    Thank you for your assistance  Lynette Boyle, OTR/L  [phone]

## 2018-12-28 NOTE — PROGRESS NOTES
Prescription for Mattress overlay faxed to [facility]   Phone: [phone]  [phone]

## 2019-01-06 DIAGNOSIS — Z79.4 TYPE 2 DIABETES MELLITUS WITH DIABETIC NEUROPATHY, WITH LONG-TERM CURRENT USE OF INSULIN (H): ICD-10-CM

## 2019-01-06 DIAGNOSIS — E11.40 TYPE 2 DIABETES MELLITUS WITH DIABETIC NEUROPATHY, WITH LONG-TERM CURRENT USE OF INSULIN (H): ICD-10-CM

## 2019-01-07 ENCOUNTER — MEDICAL CORRESPONDENCE (OUTPATIENT)
Dept: HEALTH INFORMATION MANAGEMENT | Facility: CLINIC | Age: 69
End: 2019-01-07

## 2019-01-07 RX ORDER — INSULIN GLARGINE 100 [IU]/ML
INJECTION, SOLUTION SUBCUTANEOUS
Qty: 9 ML | Refills: 1 | Status: SHIPPED | OUTPATIENT
Start: 2019-01-07 | End: 2019-02-15

## 2019-01-07 NOTE — TELEPHONE ENCOUNTER
"Requested Prescriptions   Pending Prescriptions Disp Refills     insulin glargine (BASAGLAR KWIKPEN) 100 UNIT/ML pen [Pharmacy Med Name: BASAGLAR 100 UNIT/ML KWIKPEN]  1     Sig: INJECT 28 UNITS SUBCUTANEOUS AT BEDTIME    Long Acting Insulin Protocol Passed - 1/6/2019  9:07 AM       Passed - Blood pressure less than 140/90 in past 6 months    BP Readings from Last 3 Encounters:   12/07/18 131/71   12/07/18 131/71   11/30/18 138/59                Passed - LDL on file in past 12 months    Recent Labs   Lab Test 03/29/18  1410   *            Passed - Microalbumin on file in past 12 months    Recent Labs   Lab Test 01/09/18  1041   MICROL 2,880   UMALCR 6,037.74*            Passed - Serum creatinine on file in past 12 months    Recent Labs   Lab Test 12/07/18  1411   CR 3.99*            Passed - HgbA1C in past 3 or 6 months    If HgbA1C is 8 or greater, it needs to be on file within the past 3 months.  If less than 8, must be on file within the past 6 months.     Recent Labs   Lab Test 11/08/18 06/22/18  1306   A1C  --  6.0*   HEMOGLOBINA1 5.4  --             Passed - Medication is active on med list       Passed - Patient is age 18 or older       Passed - Recent (6 mo) or future (30 days) visit within the authorizing provider's specialty    Patient had office visit in the last 6 months or has a visit in the next 30 days with authorizing provider or within the authorizing provider's specialty.  See \"Patient Info\" tab in inbasket, or \"Choose Columns\" in Meds & Orders section of the refill encounter.          Last Written Prescription Date:  10/9/18  Last Fill Quantity: 3ml,  # refills: 11   Last office visit: 11/30/2018 with prescribing provider:     Future Office Visit:      "

## 2019-01-07 NOTE — TELEPHONE ENCOUNTER
Routing refill request to provider for review/approval because:  Labs out of range:  CR, microalbumin    Viridiana Sprague RN   [facility]

## 2019-01-15 ENCOUNTER — PATIENT OUTREACH (OUTPATIENT)
Dept: CARE COORDINATION | Facility: CLINIC | Age: 69
End: 2019-01-15

## 2019-01-15 NOTE — PROGRESS NOTES
Clinic Care Coordination Contact  Care Team Conversations    Daughter did not return call from last outreach attempt. Will close to care coordination services at this time.     Carleen Valdes R.N.  Clinic Care Coordinator  [facility]  [phone]

## 2019-01-16 DIAGNOSIS — N18.5 CKD (CHRONIC KIDNEY DISEASE) STAGE 5, GFR LESS THAN 15 ML/MIN (H): Primary | ICD-10-CM

## 2019-01-18 ENCOUNTER — OFFICE VISIT (OUTPATIENT)
Dept: NEPHROLOGY | Facility: CLINIC | Age: 69
End: 2019-01-18
Payer: MEDICARE

## 2019-01-18 VITALS
SYSTOLIC BLOOD PRESSURE: 146 MMHG | DIASTOLIC BLOOD PRESSURE: 72 MMHG | OXYGEN SATURATION: 97 % | HEART RATE: 66 BPM | TEMPERATURE: 98.4 F

## 2019-01-18 DIAGNOSIS — N18.5 CKD (CHRONIC KIDNEY DISEASE) STAGE 5, GFR LESS THAN 15 ML/MIN (H): Primary | ICD-10-CM

## 2019-01-18 DIAGNOSIS — D50.9 IRON DEFICIENCY ANEMIA, UNSPECIFIED IRON DEFICIENCY ANEMIA TYPE: ICD-10-CM

## 2019-01-18 DIAGNOSIS — N18.5 CKD (CHRONIC KIDNEY DISEASE) STAGE 5, GFR LESS THAN 15 ML/MIN (H): ICD-10-CM

## 2019-01-18 DIAGNOSIS — I10 HYPERTENSION GOAL BP (BLOOD PRESSURE) < 140/90: ICD-10-CM

## 2019-01-18 DIAGNOSIS — F41.1 GAD (GENERALIZED ANXIETY DISORDER): ICD-10-CM

## 2019-01-18 LAB
ALBUMIN SERPL-MCNC: 2.7 G/DL (ref 3.4–5)
ANION GAP SERPL CALCULATED.3IONS-SCNC: 6 MMOL/L (ref 3–14)
BASOPHILS # BLD AUTO: 0.1 10E9/L (ref 0–0.2)
BASOPHILS NFR BLD AUTO: 1.1 %
BUN SERPL-MCNC: 70 MG/DL (ref 7–30)
CALCIUM SERPL-MCNC: 8.5 MG/DL (ref 8.5–10.1)
CHLORIDE SERPL-SCNC: 108 MMOL/L (ref 94–109)
CO2 SERPL-SCNC: 28 MMOL/L (ref 20–32)
CREAT SERPL-MCNC: 3.68 MG/DL (ref 0.52–1.04)
DIFFERENTIAL METHOD BLD: ABNORMAL
EOSINOPHIL # BLD AUTO: 0.2 10E9/L (ref 0–0.7)
EOSINOPHIL NFR BLD AUTO: 4.1 %
ERYTHROCYTE [DISTWIDTH] IN BLOOD BY AUTOMATED COUNT: 12.4 % (ref 10–15)
GFR SERPL CREATININE-BSD FRML MDRD: 12 ML/MIN/{1.73_M2}
GLUCOSE SERPL-MCNC: 166 MG/DL (ref 70–99)
HCT VFR BLD AUTO: 32.4 % (ref 35–47)
HGB BLD-MCNC: 10.2 G/DL (ref 11.7–15.7)
IMM GRANULOCYTES # BLD: 0 10E9/L (ref 0–0.4)
IMM GRANULOCYTES NFR BLD: 0.2 %
LYMPHOCYTES # BLD AUTO: 1.5 10E9/L (ref 0.8–5.3)
LYMPHOCYTES NFR BLD AUTO: 28 %
MCH RBC QN AUTO: 29.4 PG (ref 26.5–33)
MCHC RBC AUTO-ENTMCNC: 31.5 G/DL (ref 31.5–36.5)
MCV RBC AUTO: 93 FL (ref 78–100)
MONOCYTES # BLD AUTO: 0.4 10E9/L (ref 0–1.3)
MONOCYTES NFR BLD AUTO: 7.7 %
NEUTROPHILS # BLD AUTO: 3.2 10E9/L (ref 1.6–8.3)
NEUTROPHILS NFR BLD AUTO: 58.9 %
NRBC # BLD AUTO: 0 10*3/UL
NRBC BLD AUTO-RTO: 0 /100
PHOSPHATE SERPL-MCNC: 4.5 MG/DL (ref 2.5–4.5)
PLATELET # BLD AUTO: 222 10E9/L (ref 150–450)
POTASSIUM SERPL-SCNC: 4.5 MMOL/L (ref 3.4–5.3)
PTH-INTACT SERPL-MCNC: 126 PG/ML (ref 18–80)
RBC # BLD AUTO: 3.47 10E12/L (ref 3.8–5.2)
SODIUM SERPL-SCNC: 142 MMOL/L (ref 133–144)
WBC # BLD AUTO: 5.4 10E9/L (ref 4–11)

## 2019-01-18 PROCEDURE — 80069 RENAL FUNCTION PANEL: CPT

## 2019-01-18 PROCEDURE — 83970 ASSAY OF PARATHORMONE: CPT

## 2019-01-18 PROCEDURE — G0463 HOSPITAL OUTPT CLINIC VISIT: HCPCS | Mod: ZF

## 2019-01-18 PROCEDURE — 36415 COLL VENOUS BLD VENIPUNCTURE: CPT

## 2019-01-18 PROCEDURE — 82306 VITAMIN D 25 HYDROXY: CPT

## 2019-01-18 PROCEDURE — 85025 COMPLETE CBC W/AUTO DIFF WBC: CPT

## 2019-01-18 RX ORDER — AMLODIPINE BESYLATE 5 MG/1
5 TABLET ORAL DAILY
Qty: 90 TABLET | Refills: 3 | Status: SHIPPED | OUTPATIENT
Start: 2019-01-18 | End: 2019-05-24

## 2019-01-18 ASSESSMENT — PAIN SCALES - GENERAL: PAINLEVEL: NO PAIN (0)

## 2019-01-18 NOTE — LETTER
1/18/2019      RE: Supriya Herr  [address]  [facility] [id number]       Nephrology Clinic Visit 1/18/19    Assessment and Plan:     1. CKD5 w/proteinuria- Stable. Creat 3.6, eGFR 12 ml/mn, UPCR 5.6 g/gCr. No uremic symptoms   - Etiology of CKD felt to be DM, HTN, Vascular dz,    -  Inactive on transplant wait list   - Will need AVG when time comes to initiate HD   - No need to initiate at this time, no uremic symptoms, lytes, bicarb fine   - Does not use NSAIDs   - Blood pressures not at goal   - DM with excellent control. A1c 5.4% in Nov 2018   - On statin for CV risk reduction     2. Volume status - Euvolemic. No edema. Blood pressures elevated. No current weight given amputation.   Remains on Lasix 60 mg am, 40 mg pm     3. HTN - Uncontrolled. Blood pressures 146-164/63-72. Outside blood pressures in the 150-160/ range. HR 66 current regimen:    - lasix 60/40   - Coreg 12.5 mg bid      - Not increasing coreg due to HR of 66      - Begin Amlodipine 5 mg qd   - Continue home monitoring   - Blood pressure goal < 140/80     4. Electrolytes - No acute concerns. K 4.5, Na 142     5. Acid base - No acute concerns. Bicarb 28     6. BMD - Ca corrected 9.5, Phos 4.5, Albumin 2.7   - Vit D 27 (10/18)   -  (1/19)   - On Calcitriol and Cholecalciferol     7. Anemia - Hgb 10.2   - Iron studies 12/18: Ferritin 348, Fe 42, IS 18   - Receives intermit IV iron infusions   - No need for KELLIE at this time   - Colonoscopy 2018, tubular adenomas     8. DM2 - Well controlled. A1c 5.4%. On Insulin     9. Depression - Uncontrolled with emotional lability.    - Increase Zoloft to 50 mg qd     10. Disposition - RTC 4 wks for f/u with me 3 months with Dr Basilio     Assessment and plan was discussed with patient and she voiced her understanding and agreement.     Reason for Visit:  CKD5/HTN f/u     HPI:  Ms Herr is a 70 yo female with CKD5 and nephrotic range proteinuria, DM2, HTN, in today for routine CKD f/u. Last  seen by me on 18. Baseline creat in the 3-4 range since      Interval Hx:   No hospital admissions     ROS:   Patient reports increased emotional lability and crying easily. She is now attending an adult day care program three days/week that she finds very enjoyable. Blood pressures at the day care program which is held at [facility] have been in the 150-160/ range. She thinks the meals she gets there are more salty than she prepares for herself. She has not been checking blood pressures herself at home. No other concerns. Has good appetite. Blood sugars generally in target. Denies dyspnea, chest pain, abdominal pain. No bowel or bladder concerns. Doesn't yet have prosthesis.      Chronic Health Problems:     CKD5  Proteinuria  AKA, left (10/18)  T2DM  Vit D def  HTN  HLD  Anxiety/depression  Vitiligo  Non proliferative diabetic retinopathy  BCC  JONE  Anemia  Prior tobacco abuse    Personal Hx:   Lives in lower level of duplex, daughter upper level.  NS    Family Hx:   Family History   Problem Relation Age of Onset     Diabetes Mother      Hypertension Mother      Heart Disease Mother          of congestive heart failure     Eye Disorder Mother      Arthritis Mother      Obesity Mother      Heart Disease Father          from CHF     Cerebrovascular Disease Father      Arthritis Father      Musculoskeletal Disorder Other         has MS     Thyroid Disease Other      Eye Disorder Other         cataracts     Cancer Other         throat/liver     Skin Cancer No family hx of      Melanoma No family hx of      Glaucoma No family hx of      Macular Degeneration No family hx of      Allergies:  Allergies   Allergen Reactions     Penicillins Rash     Unasyn Rash     No evidence SJS, but very uncomfortable and precipitated multiple provider visits. Would not use penicillins again if other options available.        Medications:  Current Outpatient Medications   Medication Sig     acetaminophen (TYLENOL) 325 MG  tablet Take 2 tablets (650 mg) by mouth every 4 hours as needed for mild pain     albuterol (PROAIR HFA, PROVENTIL HFA, VENTOLIN HFA) 108 (90 BASE) MCG/ACT inhaler Inhale 2 puffs into the lungs every 6 hours as needed for shortness of breath / dyspnea or wheezing     amLODIPine (NORVASC) 5 MG tablet Take 1 tablet (5 mg) by mouth daily     atorvastatin (LIPITOR) 20 MG tablet TAKE 1 TABLET BY MOUTH ONCE DAILY     BASAGLAR 100 UNIT/ML injection Inject 20 Units Subcutaneous At Bedtime Inject 22 U SubCut at HS (Patient taking differently: Inject 20 Units Subcutaneous At Bedtime )     blood glucose (ONETOUCH ULTRA) test strip TEST YOUR BLOOD SUGAR 3-4 TIMES PER DAY.     calcitRIOL (ROCALTROL) 0.25 MCG capsule TAKE 1 CAPSULE (0.25 MCG) BY MOUTH DAILY     carvedilol (COREG) 12.5 MG tablet Take 1 tablet (12.5 mg) by mouth 2 times daily (with meals)     Cholecalciferol (VITAMIN D) 2000 units tablet Take 2,000 Units by mouth daily     clindamycin (CLINDAMAX) 1 % topical gel Apply topically 2 times daily     emollient (VANICREAM) cream Apply topically 2 times daily     furosemide (LASIX) 40 MG tablet Take 1 tablet (40 mg) by mouth 2 times daily (Patient taking differently: Take 40 mg by mouth 2 times daily )     insulin aspart (NOVOLOG FLEXPEN) 100 UNIT/ML pen Inject 4 units SQ with breakfast, lunch and dinner.     insulin glargine (BASAGLAR KWIKPEN) 100 UNIT/ML pen INJECT 28 UNITS SUBCUTANEOUS AT BEDTIME     insulin pen needle (ULTICARE MINI) 31G X 6 MM Use daily or as directed.     NOVOLOG FLEXPEN 100 UNIT/ML soln 4 Units Inject 4 units SQ with breakfast, lunch and dinner. IF SNACK BETWEEN LUNCH AND DINNER SHE TAKES AN EXTRA 2 UNITS     order for DME Equipment being ordered: Mattress Overlay for Hospital Bed. Height 65. Weight 168 lbs.  Length of need: Lifetime     order for DME Equipment being ordered: toilet riser, lifetime need  DX amputation of leg above the knee, S78.119     order for DME 1 SAD light     order for DME  Equipment being ordered: Right Lower extremity Solaris Ready wrap calf piece:  Size small/length tall , knee piece: Size small , Thigh piece size small/length average.     order for DME 1 wheelchair     order for DME Equipment being ordered: TEDS stocking   Below the knee 15-20 mg  Dispense 2  Use daily     ORDER FOR DME Equipment being ordered: Compression stockings, 20-30 MMHG, knee high     sertraline (ZOLOFT) 50 MG tablet Take 1 tablet (50 mg) by mouth daily     triamcinolone (KENALOG) 0.1 % cream Apply to sites of dermatitis- the abdomen, armpits, groin as needed.     triamcinolone (KENALOG) 0.1 % external ointment Apply thin layer twice daily to pink scaly rash on the right arm, underneath the breasts, groin folds, and right foot.     No current facility-administered medications for this visit.       Vitals:  /72   Pulse 66   Temp 98.4  F (36.9  C)   SpO2 97%     Exam:  GEN: Pleasant female in NAD. Daughter present  CARDIAC: RRR  LUNGS: CTA  ABDOMEN: Soft NT  EXT: Left LORI, Right no edema  NEURO: alert. Oriented. No asterixis    LABS:   CMP  Recent Labs   Lab Test 01/18/19  1457 12/07/18  1411 11/01/18  1530 10/17/18  1413    143 146* 143   POTASSIUM 4.5 4.3 3.8 4.5   CHLORIDE 108 111* 114* 113*   CO2 28 25 23 23   ANIONGAP 6 7 9 7   * 117* 131* 136*   BUN 70* 74* 53* 66*   CR 3.68* 3.99* 3.17* 3.37*   GFRESTIMATED 12* 11* 14* 14*   GFRESTBLACK 14* 13* 18* 16*   JODY 8.5 8.3* 8.6 8.2*     Recent Labs   Lab Test 07/31/18  1148 07/24/18  0900 07/17/18  0830 06/24/18  0623 06/23/18  2347 03/29/18  1410 11/13/17  0715   BILITOTAL  --   --   --  0.2 0.2 0.2 0.3   ALKPHOS  --   --   --  49 56 56 70   ALT  --   --   --  17 13 15 37   AST 24 24 12 16 12 14 30     CBC  Recent Labs   Lab Test 01/18/19  1457 12/07/18  1411 11/01/18  1530 10/17/18  1413  07/31/18  1148 07/24/18  0900   HGB 10.2* 10.3* 9.0* 9.0*   < > 9.3* 9.7*   WBC 5.4 5.7  --   --   --  5.1 6.5   RBC 3.47* 3.47*  --   --   --  3.09*  3.25*   HCT 32.4* 32.3*  --   --   --  29.1* 30.1*   MCV 93 93  --   --   --  94 93   MCH 29.4 29.7  --   --   --  30.1 29.8   MCHC 31.5 31.9  --   --   --  32.0 32.2   RDW 12.4 12.4  --   --   --  12.8 12.7    237  --   --   --  246 278    < > = values in this interval not displayed.     URINE STUDIES  Recent Labs   Lab Test 03/29/18  1448 01/25/18  0233 11/02/17  0602 10/26/16  1220  12/05/12  1541   COLOR Straw Light Yellow Light Yellow Yellow   < > Yellow   APPEARANCE Clear Clear Clear Slightly Cloudy   < > Clear   URINEGLC 50* Negative 300* >500*   < > 100*   URINEBILI Negative Negative Negative Negative   < > Negative   URINEKETONE Negative Negative Negative Negative   < > Negative   SG 1.008 1.007 1.010 1.014   < > 1.025   UBLD Negative Negative Negative Negative   < > Small*   URINEPH 5.0 6.5 7.5* 6.0   < > 6.0   PROTEIN >499* 100* >600* >500*   < > 100*   UROBILINOGEN  --   --   --   --   --  0.2   NITRITE Negative Negative Negative Negative   < > Negative   LEUKEST Negative Negative Small* Small*   < > Negative   RBCU 1 0 1 5*   < >  --    WBCU 2 1 44* 54*   < >  --     < > = values in this interval not displayed.     Recent Labs   Lab Test 12/07/18  1417 11/01/18  1533 05/24/18  1441 04/11/18  1025   UTPG 5.60* 6.71* 5.97* 4.98*     PTH  Recent Labs   Lab Test 01/18/19  1457 10/17/18  1413 05/24/18  1435   PTHI 126* 127* 55     IRON STUDIES  Recent Labs   Lab Test 12/07/18  1411 07/18/18  1417 03/29/18  1410   IRON 42 76 27*    196* 281   IRONSAT 18 39 9*   ELENA 348* 450* 105       Silva Guerrero, KEIRA

## 2019-01-18 NOTE — NURSING NOTE
"Chief Complaint   Patient presents with     RECHECK     Follow Up CKD     Vital signs:  Temp: 98.4  F (36.9  C)   BP: 146/72 Pulse: 66     SpO2: 97 %          Estimated body mass index is 30.73 kg/m  as calculated from the following:    Height as of 11/6/18: 1.676 m (5' 6\").    Weight as of 11/6/18: 86.4 kg (190 lb 6.4 oz).        Bety Abreu    "

## 2019-01-19 LAB — DEPRECATED CALCIDIOL+CALCIFEROL SERPL-MC: 27 UG/L (ref 20–75)

## 2019-01-19 NOTE — PROGRESS NOTES
Nephrology Clinic Visit 1/18/19    Assessment and Plan:     1. CKD5 w/proteinuria- Stable. Creat 3.6, eGFR 12 ml/mn, UPCR 5.6 g/gCr. No uremic symptoms   - Etiology of CKD felt to be DM, HTN, Vascular dz,    - Inactive on transplant wait list   - Will need AVG when time comes to initiate HD   - No need to initiate at this time, no uremic symptoms, lytes, bicarb fine   - Does not use NSAIDs   - Blood pressures not at goal   - DM with excellent control. A1c 5.4% in Nov 2018   - On statin for CV risk reduction     2. Volume status - Euvolemic. No edema. Blood pressures elevated. No current weight given amputation.   Remains on Lasix 60 mg am, 40 mg pm     3. HTN - Uncontrolled. Blood pressures 146-164/63-72. Outside blood pressures in the 150-160/ range. HR 66 current regimen:    - lasix 60/40   - Coreg 12.5 mg bid      - Not increasing coreg due to HR of 66      - Begin Amlodipine 5 mg qd   - Continue home monitoring   - Blood pressure goal < 140/80     4. Electrolytes - No acute concerns. K 4.5, Na 142     5. Acid base - No acute concerns. Bicarb 28     6. BMD - Ca corrected 9.5, Phos 4.5, Albumin 2.7   - Vit D 27 (10/18)   -  (1/19)   - On Calcitriol and Cholecalciferol     7. Anemia - Hgb 10.2   - Iron studies 12/18: Ferritin 348, Fe 42, IS 18   - Receives intermit IV iron infusions   - No need for KELLIE at this time   - Colonoscopy 2018, tubular adenomas     8. DM2 - Well controlled. A1c 5.4%. On Insulin     9. Depression - Uncontrolled with emotional lability.    - Increase Zoloft to 50 mg qd     10. Disposition - RTC 4 wks for f/u with me 3 months with Dr Basilio     Assessment and plan was discussed with patient and she voiced her understanding and agreement.     Reason for Visit:  CKD5/HTN f/u     HPI:  Ms Herr is a 68 yo female with CKD5 and nephrotic range proteinuria, DM2, HTN, in today for routine CKD f/u. Last seen by me on 12/7/18. Baseline creat in the 3-4 range since 11/17     Interval Hx:    No hospital admissions     ROS:   Patient reports increased emotional lability and crying easily. She is now attending an adult day care program three days/week that she finds very enjoyable. Blood pressures at the day care program which is held at [facility] have been in the 150-160/ range. She thinks the meals she gets there are more salty than she prepares for herself. She has not been checking blood pressures herself at home. No other concerns. Has good appetite. Blood sugars generally in target. Denies dyspnea, chest pain, abdominal pain. No bowel or bladder concerns. Doesn't yet have prosthesis.      Chronic Health Problems:     CKD5  Proteinuria  AKA, left (10/18)  T2DM  Vit D def  HTN  HLD  Anxiety/depression  Vitiligo  Non proliferative diabetic retinopathy  BCC  JONE  Anemia  Prior tobacco abuse    Personal Hx:   Lives in lower level of duplex, daughter upper level. NS    Family Hx:   Family History   Problem Relation Age of Onset     Diabetes Mother      Hypertension Mother      Heart Disease Mother          of congestive heart failure     Eye Disorder Mother      Arthritis Mother      Obesity Mother      Heart Disease Father          from CHF     Cerebrovascular Disease Father      Arthritis Father      Musculoskeletal Disorder Other         has MS     Thyroid Disease Other      Eye Disorder Other         cataracts     Cancer Other         throat/liver     Skin Cancer No family hx of      Melanoma No family hx of      Glaucoma No family hx of      Macular Degeneration No family hx of      Allergies:  Allergies   Allergen Reactions     Penicillins Rash     Unasyn Rash     No evidence SJS, but very uncomfortable and precipitated multiple provider visits. Would not use penicillins again if other options available.        Medications:  Current Outpatient Medications   Medication Sig     acetaminophen (TYLENOL) 325 MG tablet Take 2 tablets (650 mg) by mouth every 4 hours as needed for mild pain      albuterol (PROAIR HFA, PROVENTIL HFA, VENTOLIN HFA) 108 (90 BASE) MCG/ACT inhaler Inhale 2 puffs into the lungs every 6 hours as needed for shortness of breath / dyspnea or wheezing     amLODIPine (NORVASC) 5 MG tablet Take 1 tablet (5 mg) by mouth daily     atorvastatin (LIPITOR) 20 MG tablet TAKE 1 TABLET BY MOUTH ONCE DAILY     BASAGLAR 100 UNIT/ML injection Inject 20 Units Subcutaneous At Bedtime Inject 22 U SubCut at HS (Patient taking differently: Inject 20 Units Subcutaneous At Bedtime )     blood glucose (ONETOUCH ULTRA) test strip TEST YOUR BLOOD SUGAR 3-4 TIMES PER DAY.     calcitRIOL (ROCALTROL) 0.25 MCG capsule TAKE 1 CAPSULE (0.25 MCG) BY MOUTH DAILY     carvedilol (COREG) 12.5 MG tablet Take 1 tablet (12.5 mg) by mouth 2 times daily (with meals)     Cholecalciferol (VITAMIN D) 2000 units tablet Take 2,000 Units by mouth daily     clindamycin (CLINDAMAX) 1 % topical gel Apply topically 2 times daily     emollient (VANICREAM) cream Apply topically 2 times daily     furosemide (LASIX) 40 MG tablet Take 1 tablet (40 mg) by mouth 2 times daily (Patient taking differently: Take 40 mg by mouth 2 times daily )     insulin aspart (NOVOLOG FLEXPEN) 100 UNIT/ML pen Inject 4 units SQ with breakfast, lunch and dinner.     insulin glargine (BASAGLAR KWIKPEN) 100 UNIT/ML pen INJECT 28 UNITS SUBCUTANEOUS AT BEDTIME     insulin pen needle (ULTICARE MINI) 31G X 6 MM Use daily or as directed.     NOVOLOG FLEXPEN 100 UNIT/ML soln 4 Units Inject 4 units SQ with breakfast, lunch and dinner. IF SNACK BETWEEN LUNCH AND DINNER SHE TAKES AN EXTRA 2 UNITS     order for DME Equipment being ordered: Mattress Overlay for Hospital Bed. Height 65. Weight 168 lbs.  Length of need: Lifetime     order for DME Equipment being ordered: toilet riser, lifetime need  DX amputation of leg above the knee, S78.119     order for DME 1 SAD light     order for DME Equipment being ordered: Right Lower extremity Solaris Ready wrap calf piece:  Size  small/length tall , knee piece: Size small , Thigh piece size small/length average.     order for DME 1 wheelchair     order for DME Equipment being ordered: TEDS stocking   Below the knee 15-20 mg  Dispense 2  Use daily     ORDER FOR DME Equipment being ordered: Compression stockings, 20-30 MMHG, knee high     sertraline (ZOLOFT) 50 MG tablet Take 1 tablet (50 mg) by mouth daily     triamcinolone (KENALOG) 0.1 % cream Apply to sites of dermatitis- the abdomen, armpits, groin as needed.     triamcinolone (KENALOG) 0.1 % external ointment Apply thin layer twice daily to pink scaly rash on the right arm, underneath the breasts, groin folds, and right foot.     No current facility-administered medications for this visit.       Vitals:  /72   Pulse 66   Temp 98.4  F (36.9  C)   SpO2 97%     Exam:  GEN: Pleasant female in NAD. Daughter present  CARDIAC: RRR  LUNGS: CTA  ABDOMEN: Soft NT  EXT: Left LORI, Right no edema  NEURO: alert. Oriented. No asterixis    LABS:   CMP  Recent Labs   Lab Test 01/18/19  1457 12/07/18  1411 11/01/18  1530 10/17/18  1413    143 146* 143   POTASSIUM 4.5 4.3 3.8 4.5   CHLORIDE 108 111* 114* 113*   CO2 28 25 23 23   ANIONGAP 6 7 9 7   * 117* 131* 136*   BUN 70* 74* 53* 66*   CR 3.68* 3.99* 3.17* 3.37*   GFRESTIMATED 12* 11* 14* 14*   GFRESTBLACK 14* 13* 18* 16*   JODY 8.5 8.3* 8.6 8.2*     Recent Labs   Lab Test 07/31/18  1148 07/24/18  0900 07/17/18  0830 06/24/18  0623 06/23/18  2347 03/29/18  1410 11/13/17  0715   BILITOTAL  --   --   --  0.2 0.2 0.2 0.3   ALKPHOS  --   --   --  49 56 56 70   ALT  --   --   --  17 13 15 37   AST 24 24 12 16 12 14 30     CBC  Recent Labs   Lab Test 01/18/19  1457 12/07/18  1411 11/01/18  1530 10/17/18  1413  07/31/18  1148 07/24/18  0900   HGB 10.2* 10.3* 9.0* 9.0*   < > 9.3* 9.7*   WBC 5.4 5.7  --   --   --  5.1 6.5   RBC 3.47* 3.47*  --   --   --  3.09* 3.25*   HCT 32.4* 32.3*  --   --   --  29.1* 30.1*   MCV 93 93  --   --   --  94 93    MCH 29.4 29.7  --   --   --  30.1 29.8   MCHC 31.5 31.9  --   --   --  32.0 32.2   RDW 12.4 12.4  --   --   --  12.8 12.7    237  --   --   --  246 278    < > = values in this interval not displayed.     URINE STUDIES  Recent Labs   Lab Test 03/29/18  1448 01/25/18  0233 11/02/17  0602 10/26/16  1220  12/05/12  1541   COLOR Straw Light Yellow Light Yellow Yellow   < > Yellow   APPEARANCE Clear Clear Clear Slightly Cloudy   < > Clear   URINEGLC 50* Negative 300* >500*   < > 100*   URINEBILI Negative Negative Negative Negative   < > Negative   URINEKETONE Negative Negative Negative Negative   < > Negative   SG 1.008 1.007 1.010 1.014   < > 1.025   UBLD Negative Negative Negative Negative   < > Small*   URINEPH 5.0 6.5 7.5* 6.0   < > 6.0   PROTEIN >499* 100* >600* >500*   < > 100*   UROBILINOGEN  --   --   --   --   --  0.2   NITRITE Negative Negative Negative Negative   < > Negative   LEUKEST Negative Negative Small* Small*   < > Negative   RBCU 1 0 1 5*   < >  --    WBCU 2 1 44* 54*   < >  --     < > = values in this interval not displayed.     Recent Labs   Lab Test 12/07/18  1417 11/01/18  1533 05/24/18  1441 04/11/18  1025   UTPG 5.60* 6.71* 5.97* 4.98*     PTH  Recent Labs   Lab Test 01/18/19  1457 10/17/18  1413 05/24/18  1435   PTHI 126* 127* 55     IRON STUDIES  Recent Labs   Lab Test 12/07/18  1411 07/18/18  1417 03/29/18  1410   IRON 42 76 27*    196* 281   IRONSAT 18 39 9*   ELENA 348* 450* 105       Silva Guerrero, KEIRA

## 2019-01-21 ENCOUNTER — TELEPHONE (OUTPATIENT)
Dept: PULMONOLOGY | Facility: CLINIC | Age: 69
End: 2019-01-21

## 2019-01-21 NOTE — TELEPHONE ENCOUNTER
Called Daughter (Caroline)  Left message.  Called Carlitos home phone left message as well.  Sent [employer] message in regarding Dr King's canceled 1/22 Clinic

## 2019-01-30 NOTE — PROGRESS NOTES
[facility] now requests orders and shares plan of care/discharge summaries for some patients through [employer].  Please REPLY TO THIS MESSAGE OR ROUTE BACK TO THE AUTHOR in order to give authorization for orders when needed.  This is considered a verbal order, you will still receive a faxed copy of orders for signature.  Thank you for your assistance in improving collaboration for our patients.    ORDER    Requesting order for social work assessment to assist with community resources and elderly care waiver.    Erick Pérez RN   [facility]  [phone]      
No complaints

## 2019-02-10 DIAGNOSIS — I10 ESSENTIAL HYPERTENSION WITH GOAL BLOOD PRESSURE LESS THAN 140/90: ICD-10-CM

## 2019-02-11 RX ORDER — CARVEDILOL 12.5 MG/1
12.5 TABLET ORAL 2 TIMES DAILY WITH MEALS
Qty: 60 TABLET | Refills: 11 | Status: SHIPPED | OUTPATIENT
Start: 2019-02-11 | End: 2019-08-07 | Stop reason: DRUGHIGH

## 2019-02-14 DIAGNOSIS — N18.5 CKD (CHRONIC KIDNEY DISEASE) STAGE 5, GFR LESS THAN 15 ML/MIN (H): Primary | ICD-10-CM

## 2019-02-15 ENCOUNTER — OFFICE VISIT (OUTPATIENT)
Dept: NEPHROLOGY | Facility: CLINIC | Age: 69
End: 2019-02-15
Payer: MEDICARE

## 2019-02-15 VITALS
HEART RATE: 63 BPM | TEMPERATURE: 97.8 F | OXYGEN SATURATION: 97 % | DIASTOLIC BLOOD PRESSURE: 75 MMHG | SYSTOLIC BLOOD PRESSURE: 132 MMHG

## 2019-02-15 DIAGNOSIS — E11.22 TYPE 2 DIABETES MELLITUS WITH STAGE 4 CHRONIC KIDNEY DISEASE, WITH LONG-TERM CURRENT USE OF INSULIN (H): ICD-10-CM

## 2019-02-15 DIAGNOSIS — I10 HYPERTENSION GOAL BP (BLOOD PRESSURE) < 140/90: Primary | ICD-10-CM

## 2019-02-15 DIAGNOSIS — E11.40 TYPE 2 DIABETES MELLITUS WITH DIABETIC NEUROPATHY, WITH LONG-TERM CURRENT USE OF INSULIN (H): ICD-10-CM

## 2019-02-15 DIAGNOSIS — E55.9 VITAMIN D DEFICIENCY: ICD-10-CM

## 2019-02-15 DIAGNOSIS — D63.1 ANEMIA DUE TO STAGE 5 CHRONIC KIDNEY DISEASE, NOT ON CHRONIC DIALYSIS (H): ICD-10-CM

## 2019-02-15 DIAGNOSIS — N18.5 CKD (CHRONIC KIDNEY DISEASE) STAGE 5, GFR LESS THAN 15 ML/MIN (H): ICD-10-CM

## 2019-02-15 DIAGNOSIS — N18.4 TYPE 2 DIABETES MELLITUS WITH STAGE 4 CHRONIC KIDNEY DISEASE, WITH LONG-TERM CURRENT USE OF INSULIN (H): ICD-10-CM

## 2019-02-15 DIAGNOSIS — Z79.4 TYPE 2 DIABETES MELLITUS WITH DIABETIC NEUROPATHY, WITH LONG-TERM CURRENT USE OF INSULIN (H): ICD-10-CM

## 2019-02-15 DIAGNOSIS — N18.5 ANEMIA DUE TO STAGE 5 CHRONIC KIDNEY DISEASE, NOT ON CHRONIC DIALYSIS (H): ICD-10-CM

## 2019-02-15 DIAGNOSIS — Z79.4 TYPE 2 DIABETES MELLITUS WITH STAGE 4 CHRONIC KIDNEY DISEASE, WITH LONG-TERM CURRENT USE OF INSULIN (H): ICD-10-CM

## 2019-02-15 DIAGNOSIS — L30.9 DERMATITIS: ICD-10-CM

## 2019-02-15 LAB
ALBUMIN SERPL-MCNC: 2.8 G/DL (ref 3.4–5)
ANION GAP SERPL CALCULATED.3IONS-SCNC: 6 MMOL/L (ref 3–14)
BASOPHILS # BLD AUTO: 0 10E9/L (ref 0–0.2)
BASOPHILS NFR BLD AUTO: 0.7 %
BUN SERPL-MCNC: 75 MG/DL (ref 7–30)
CALCIUM SERPL-MCNC: 8.6 MG/DL (ref 8.5–10.1)
CHLORIDE SERPL-SCNC: 110 MMOL/L (ref 94–109)
CO2 SERPL-SCNC: 28 MMOL/L (ref 20–32)
CREAT SERPL-MCNC: 3.53 MG/DL (ref 0.52–1.04)
DIFFERENTIAL METHOD BLD: ABNORMAL
EOSINOPHIL # BLD AUTO: 0.2 10E9/L (ref 0–0.7)
EOSINOPHIL NFR BLD AUTO: 4 %
ERYTHROCYTE [DISTWIDTH] IN BLOOD BY AUTOMATED COUNT: 12.5 % (ref 10–15)
FERRITIN SERPL-MCNC: 316 NG/ML (ref 8–252)
GFR SERPL CREATININE-BSD FRML MDRD: 12 ML/MIN/{1.73_M2}
GLUCOSE SERPL-MCNC: 126 MG/DL (ref 70–99)
HCT VFR BLD AUTO: 31.6 % (ref 35–47)
HGB BLD-MCNC: 10.3 G/DL (ref 11.7–15.7)
IMM GRANULOCYTES # BLD: 0 10E9/L (ref 0–0.4)
IMM GRANULOCYTES NFR BLD: 0.2 %
IRON SATN MFR SERPL: 24 % (ref 15–46)
IRON SERPL-MCNC: 61 UG/DL (ref 35–180)
LYMPHOCYTES # BLD AUTO: 1.5 10E9/L (ref 0.8–5.3)
LYMPHOCYTES NFR BLD AUTO: 26.6 %
MCH RBC QN AUTO: 30.3 PG (ref 26.5–33)
MCHC RBC AUTO-ENTMCNC: 32.6 G/DL (ref 31.5–36.5)
MCV RBC AUTO: 93 FL (ref 78–100)
MONOCYTES # BLD AUTO: 0.4 10E9/L (ref 0–1.3)
MONOCYTES NFR BLD AUTO: 7.6 %
NEUTROPHILS # BLD AUTO: 3.4 10E9/L (ref 1.6–8.3)
NEUTROPHILS NFR BLD AUTO: 60.9 %
NRBC # BLD AUTO: 0 10*3/UL
NRBC BLD AUTO-RTO: 0 /100
PHOSPHATE SERPL-MCNC: 4.2 MG/DL (ref 2.5–4.5)
PLATELET # BLD AUTO: 232 10E9/L (ref 150–450)
POTASSIUM SERPL-SCNC: 4.3 MMOL/L (ref 3.4–5.3)
RBC # BLD AUTO: 3.4 10E12/L (ref 3.8–5.2)
SODIUM SERPL-SCNC: 144 MMOL/L (ref 133–144)
TIBC SERPL-MCNC: 249 UG/DL (ref 240–430)
WBC # BLD AUTO: 5.5 10E9/L (ref 4–11)

## 2019-02-15 PROCEDURE — 83550 IRON BINDING TEST: CPT

## 2019-02-15 PROCEDURE — 80069 RENAL FUNCTION PANEL: CPT

## 2019-02-15 PROCEDURE — 85025 COMPLETE CBC W/AUTO DIFF WBC: CPT

## 2019-02-15 PROCEDURE — 83540 ASSAY OF IRON: CPT

## 2019-02-15 PROCEDURE — 36415 COLL VENOUS BLD VENIPUNCTURE: CPT

## 2019-02-15 PROCEDURE — 82728 ASSAY OF FERRITIN: CPT

## 2019-02-15 RX ORDER — FUROSEMIDE 40 MG
40 TABLET ORAL 2 TIMES DAILY
Qty: 60 TABLET | Refills: 3 | Status: SHIPPED | OUTPATIENT
Start: 2019-02-15 | End: 2019-07-09

## 2019-02-15 RX ORDER — CHOLECALCIFEROL (VITAMIN D3) 50 MCG
2000 TABLET ORAL DAILY
Qty: 100 TABLET | Refills: 3 | Status: SHIPPED | OUTPATIENT
Start: 2019-02-15 | End: 2019-07-09

## 2019-02-15 RX ORDER — INSULIN GLARGINE 100 [IU]/ML
22 INJECTION, SOLUTION SUBCUTANEOUS AT BEDTIME
Qty: 2 ML | Refills: 3 | Status: SHIPPED | OUTPATIENT
Start: 2019-02-15 | End: 2020-04-01

## 2019-02-15 RX ORDER — TRIAMCINOLONE ACETONIDE 1 MG/G
CREAM TOPICAL
Qty: 30 G | Refills: 0 | Status: SHIPPED | OUTPATIENT
Start: 2019-02-15 | End: 2020-06-11

## 2019-02-15 RX ORDER — INSULIN GLARGINE 100 [IU]/ML
20 INJECTION, SOLUTION SUBCUTANEOUS AT BEDTIME
Qty: 2 ML | Refills: 3 | Status: SHIPPED | OUTPATIENT
Start: 2019-02-15 | End: 2019-02-15

## 2019-02-15 NOTE — LETTER
2/15/2019     RE: Supriya Herr  [address]  [facility] [address]     Dear Colleague,    Thank you for referring your patient, Supriya Herr, to the [facility] NEPHROLOGY at [facility]. Please see a copy of my visit note below.    Nephrology Clinic Visit 2/15/19    Assessment and Plan:     1. CKD5 w/proteinuria- Stable. Creat 3.5, eGFR 12 ml/mn, UPCR 5.6 g/gCr. No uremic symptoms   - Etiology of CKD felt to be DM, HTN, Vascular dz,    - Inactive on transplant wait list   - Will need AVG when time comes to initiate HD   - No need to initiate at this time, no uremic symptoms, lytes, bicarb fine   - Does not use NSAIDs   - Blood pressures approaching goal of < 130/80   - DM with excellent control. A1c 5.4% in Nov 2018   - On statin for CV risk reduction     2. Volume status - Euvolemic. No edema. Blood pressures  improved. No current weight given amputation.   Remains on Lasix 60 mg am, 40 mg pm     3. HTN -  Improved control. Clinic blood pressures 132-154/75-77. Outside blood pressures in the 130-140/ range. HR 63  Current regimen:    - lasix 60/40   - Coreg 12.5 mg bid   - Amlodipine 5 mg every day       - Will continue current regimen      -  Did not increase Coreg due to HR in the 60's    - Continue home monitoring   - Blood pressure goal < 130/80     4. Electrolytes - No acute concerns. K 4.3, Na 144     5. Acid base - No acute concerns. Bicarb 28     6. BMD - Ca corrected 9.5, Phos 4.2, Albumin 2.8   - Vit D 27 (10/18)   -  (1/19)   - On Calcitriol and Cholecalciferol. Daughter misunderstood and patient was only getting Calcitriol. Will resume Meseret as well.      7. Anemia - Hgb 10.3   - Iron studies 2/19: Ferritin 316, Fe 61, IS 24.    - Receives intermit IV iron infusions   - No need for KELLIE at this time   - Colonoscopy 2018, tubular adenomas     8. DM2 - Well controlled. A1c 5.4%. On Insulin      9. Depression - Controlled with increase in Zoloft to 50  mg every day.       10. Disposition - RTC  in April for f/u with Dr Basilio     Assessment and plan was discussed with patient and she voiced her understanding and agreement.     Reason for Visit:  CKD5/HTN f/u     HPI:  Ms Herr is a 68 yo female with CKD5 and nephrotic range proteinuria, DM2, HTN, in today for routine CKD f/u. Last seen by me on 19.  Amlodipine was started given elevated blood pressures and Zoloft increased due to emotional lability. Baseline creat in the 3-4 range since      Interval Hx:   No hospital admissions     ROS:   Blood pressures at day program now declining into the 130-140/ range after starting Amlodipine. Patient notes decreased emotional lability since increase in Zoloft. No other concerns or complaints. Has good appetite. Blood sugars generally in target. Denies dyspnea, chest pain, abdominal pain. No bowel or bladder concerns. Doesn't yet have prosthesis.      Chronic Health Problems:     CKD5  Proteinuria  AKA, left (10/18)  T2DM  Vit D def  HTN  HLD  Anxiety/depression  Vitiligo  Non proliferative diabetic retinopathy  BCC  JONE  Anemia  Prior tobacco abuse     Personal Hx:   Lives in lower level of duplex, daughter upper level. NS    Family Hx:   Family History   Problem Relation Age of Onset     Diabetes Mother      Hypertension Mother      Heart Disease Mother          of congestive heart failure     Eye Disorder Mother      Arthritis Mother      Obesity Mother      Heart Disease Father          from CHF     Cerebrovascular Disease Father      Arthritis Father      Musculoskeletal Disorder Other         has MS     Thyroid Disease Other      Eye Disorder Other         cataracts     Cancer Other         throat/liver     Skin Cancer No family hx of      Melanoma No family hx of      Glaucoma No family hx of      Macular Degeneration No family hx of          Allergies:  Allergies   Allergen Reactions     Penicillins Rash     Unasyn Rash     No evidence SJS, but  very uncomfortable and precipitated multiple provider visits. Would not use penicillins again if other options available.        Medications:  Current Outpatient Medications   Medication Sig     acetaminophen (TYLENOL) 325 MG tablet Take 2 tablets (650 mg) by mouth every 4 hours as needed for mild pain     albuterol (PROAIR HFA, PROVENTIL HFA, VENTOLIN HFA) 108 (90 BASE) MCG/ACT inhaler Inhale 2 puffs into the lungs every 6 hours as needed for shortness of breath / dyspnea or wheezing     amLODIPine (NORVASC) 5 MG tablet Take 1 tablet (5 mg) by mouth daily     atorvastatin (LIPITOR) 20 MG tablet TAKE 1 TABLET BY MOUTH ONCE DAILY     blood glucose (ONETOUCH ULTRA) test strip TEST YOUR BLOOD SUGAR 3-4 TIMES PER DAY.     calcitRIOL (ROCALTROL) 0.25 MCG capsule TAKE 1 CAPSULE (0.25 MCG) BY MOUTH DAILY     carvedilol (COREG) 12.5 MG tablet TAKE 1 TABLET (12.5 MG) BY MOUTH 2 TIMES DAILY (WITH MEALS)     clindamycin (CLINDAMAX) 1 % topical gel Apply topically 2 times daily     furosemide (LASIX) 40 MG tablet Take 1 tablet (40 mg) by mouth 2 times daily     insulin aspart (NOVOLOG FLEXPEN) 100 UNIT/ML pen Inject 4 units SQ with breakfast, lunch and dinner.     insulin glargine (BASAGLAR KWIKPEN) 100 UNIT/ML pen Inject 22 Units Subcutaneous At Bedtime Inject 22 U SubCut at HS     insulin pen needle (ULTICARE MINI) 31G X 6 MM Use daily or as directed.     order for DME Equipment being ordered: Mattress Overlay for Hospital Bed. Height 65. Weight 168 lbs.  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     order for DME 1 wheelchair     sertraline (ZOLOFT) 50 MG tablet Take 1 tablet (50 mg) by mouth daily      triamcinolone (KENALOG) 0.1 % external cream Apply to sites of dermatitis- the abdomen, armpits, groin as needed.     order for DME Equipment being ordered: TEDS stocking   Below the knee 15-20 mg  Dispense 2  Use daily     ORDER FOR DME Equipment being ordered: Compression stockings, 20-30 MMHG, knee high     vitamin D3 (CHOLECALCIFEROL) 2000 units tablet Take 1 tablet by mouth daily (Patient not taking: Reported on 2/15/2019)     No current facility-administered medications for this visit.       Vitals:  /75   Pulse 63   Temp 97.8  F (36.6  C)   SpO2 97%     Exam:  GEN: Pleasant female in NAD. Daughter present  CARDIAC: RRR  LUNGS: CTA  ABDOMEN: Soft NT  EXT: Left LORI, Right no edema  NEURO: alert. Oriented. No asterixis    LABS:   CMP  Recent Labs   Lab Test 02/15/19  1459 01/18/19  1457 12/07/18  1411 11/01/18  1530    142 143 146*   POTASSIUM 4.3 4.5 4.3 3.8   CHLORIDE 110* 108 111* 114*   CO2 28 28 25 23   ANIONGAP 6 6 7 9   * 166* 117* 131*   BUN 75* 70* 74* 53*   CR 3.53* 3.68* 3.99* 3.17*   GFRESTIMATED 12* 12* 11* 14*   GFRESTBLACK 14* 14* 13* 18*   JODY 8.6 8.5 8.3* 8.6     Recent Labs   Lab Test 07/31/18  1148 07/24/18  0900 07/17/18  0830 06/24/18  0623 06/23/18  2347 03/29/18  1410 11/13/17  0715   BILITOTAL  --   --   --  0.2 0.2 0.2 0.3   ALKPHOS  --   --   --  49 56 56 70   ALT  --   --   --  17 13 15 37   AST 24 24 12 16 12 14 30     CBC  Recent Labs   Lab Test 02/15/19  1459 01/18/19  1457 12/07/18  1411 11/01/18  1530  07/31/18  1148   HGB 10.3* 10.2* 10.3* 9.0*   < > 9.3*   WBC 5.5 5.4 5.7  --   --  5.1   RBC 3.40* 3.47* 3.47*  --   --  3.09*   HCT 31.6* 32.4* 32.3*  --   --  29.1*   MCV 93 93 93  --   --  94   MCH 30.3 29.4 29.7  --   --  30.1   MCHC 32.6 31.5 31.9  --   --  32.0   RDW 12.5 12.4 12.4  --   --  12.8    222 237  --   --  246    < > = values in this interval not displayed.     URINE STUDIES  Recent Labs   Lab Test 03/29/18  1448 01/25/18  0233  11/02/17  0602 10/26/16  1220  12/05/12  1541   COLOR Straw Light Yellow Light Yellow Yellow   < > Yellow   APPEARANCE Clear Clear Clear Slightly Cloudy   < > Clear   URINEGLC 50* Negative 300* >500*   < > 100*   URINEBILI Negative Negative Negative Negative   < > Negative   URINEKETONE Negative Negative Negative Negative   < > Negative   SG 1.008 1.007 1.010 1.014   < > 1.025   UBLD Negative Negative Negative Negative   < > Small*   URINEPH 5.0 6.5 7.5* 6.0   < > 6.0   PROTEIN >499* 100* >600* >500*   < > 100*   UROBILINOGEN  --   --   --   --   --  0.2   NITRITE Negative Negative Negative Negative   < > Negative   LEUKEST Negative Negative Small* Small*   < > Negative   RBCU 1 0 1 5*   < >  --    WBCU 2 1 44* 54*   < >  --     < > = values in this interval not displayed.     Recent Labs   Lab Test 12/07/18  1417 11/01/18  1533 05/24/18  1441 04/11/18  1025   UTPG 5.60* 6.71* 5.97* 4.98*     PTH  Recent Labs   Lab Test 01/18/19  1457 10/17/18  1413 05/24/18  1435   PTHI 126* 127* 55     IRON STUDIES  Recent Labs   Lab Test 02/15/19  1459 12/07/18  1411 07/18/18  1417   IRON 61 42 76    240 196*   IRONSAT 24 18 39   ELENA 316* 348* 450*       Silva Guerrero NP    Again, thank you for allowing me to participate in the care of your patient.      Sincerely,    Silva Guerrero NP

## 2019-02-16 NOTE — PROGRESS NOTES
Nephrology Clinic Visit 2/15/19    Assessment and Plan:     1. CKD5 w/proteinuria- Stable. Creat 3.5, eGFR 12 ml/mn, UPCR 5.6 g/gCr. No uremic symptoms   - Etiology of CKD felt to be DM, HTN, Vascular dz,    - Inactive on transplant wait list   - Will need AVG when time comes to initiate HD   - No need to initiate at this time, no uremic symptoms, lytes, bicarb fine   - Does not use NSAIDs   - Blood pressures approaching goal of < 130/80   - DM with excellent control. A1c 5.4% in Nov 2018   - On statin for CV risk reduction     2. Volume status - Euvolemic. No edema. Blood pressures improved. No current weight given amputation.   Remains on Lasix 60 mg am, 40 mg pm     3. HTN - Improved control. Clinic blood pressures 132-154/75-77. Outside blood pressures in the 130-140/ range. HR 63  Current regimen:    - lasix 60/40   - Coreg 12.5 mg bid   - Amlodipine 5 mg every day       - Will continue current regimen      - Did not increase Coreg due to HR in the 60's    - Continue home monitoring   - Blood pressure goal < 130/80     4. Electrolytes - No acute concerns. K 4.3, Na 144     5. Acid base - No acute concerns. Bicarb 28     6. BMD - Ca corrected 9.5, Phos 4.2, Albumin 2.8   - Vit D 27 (10/18)   -  (1/19)   - On Calcitriol and Cholecalciferol. Daughter misunderstood and patient was only getting Calcitriol. Will resume Meseret as well.      7. Anemia - Hgb 10.3   - Iron studies 2/19: Ferritin 316, Fe 61, IS 24.    - Receives intermit IV iron infusions   - No need for KELLIE at this time   - Colonoscopy 2018, tubular adenomas     8. DM2 - Well controlled. A1c 5.4%. On Insulin      9. Depression - Controlled with increase in Zoloft to 50 mg every day.       10. Disposition - RTC in April for f/u with Dr Basilio     Assessment and plan was discussed with patient and she voiced her understanding and agreement.     Reason for Visit:  CKD5/HTN f/u     HPI:  Ms Herr is a 70 yo female with CKD5 and nephrotic range  proteinuria, DM2, HTN, in today for routine CKD f/u. Last seen by me on 19. Amlodipine was started given elevated blood pressures and Zoloft increased due to emotional lability. Baseline creat in the 3-4 range since      Interval Hx:   No hospital admissions     ROS:   Blood pressures at day program now declining into the 130-140/ range after starting Amlodipine. Patient notes decreased emotional lability since increase in Zoloft. No other concerns or complaints. Has good appetite. Blood sugars generally in target. Denies dyspnea, chest pain, abdominal pain. No bowel or bladder concerns. Doesn't yet have prosthesis.      Chronic Health Problems:     CKD5  Proteinuria  AKA, left (10/18)  T2DM  Vit D def  HTN  HLD  Anxiety/depression  Vitiligo  Non proliferative diabetic retinopathy  BCC  JONE  Anemia  Prior tobacco abuse     Personal Hx:   Lives in lower level of duplex, daughter upper level. NS    Family Hx:   Family History   Problem Relation Age of Onset     Diabetes Mother      Hypertension Mother      Heart Disease Mother          of congestive heart failure     Eye Disorder Mother      Arthritis Mother      Obesity Mother      Heart Disease Father          from CHF     Cerebrovascular Disease Father      Arthritis Father      Musculoskeletal Disorder Other         has MS     Thyroid Disease Other      Eye Disorder Other         cataracts     Cancer Other         throat/liver     Skin Cancer No family hx of      Melanoma No family hx of      Glaucoma No family hx of      Macular Degeneration No family hx of          Allergies:  Allergies   Allergen Reactions     Penicillins Rash     Unasyn Rash     No evidence SJS, but very uncomfortable and precipitated multiple provider visits. Would not use penicillins again if other options available.        Medications:  Current Outpatient Medications   Medication Sig     acetaminophen (TYLENOL) 325 MG tablet Take 2 tablets (650 mg) by mouth every 4 hours  as needed for mild pain     albuterol (PROAIR HFA, PROVENTIL HFA, VENTOLIN HFA) 108 (90 BASE) MCG/ACT inhaler Inhale 2 puffs into the lungs every 6 hours as needed for shortness of breath / dyspnea or wheezing     amLODIPine (NORVASC) 5 MG tablet Take 1 tablet (5 mg) by mouth daily     atorvastatin (LIPITOR) 20 MG tablet TAKE 1 TABLET BY MOUTH ONCE DAILY     blood glucose (ONETOUCH ULTRA) test strip TEST YOUR BLOOD SUGAR 3-4 TIMES PER DAY.     calcitRIOL (ROCALTROL) 0.25 MCG capsule TAKE 1 CAPSULE (0.25 MCG) BY MOUTH DAILY     carvedilol (COREG) 12.5 MG tablet TAKE 1 TABLET (12.5 MG) BY MOUTH 2 TIMES DAILY (WITH MEALS)     clindamycin (CLINDAMAX) 1 % topical gel Apply topically 2 times daily     furosemide (LASIX) 40 MG tablet Take 1 tablet (40 mg) by mouth 2 times daily     insulin aspart (NOVOLOG FLEXPEN) 100 UNIT/ML pen Inject 4 units SQ with breakfast, lunch and dinner.     insulin glargine (BASAGLAR KWIKPEN) 100 UNIT/ML pen Inject 22 Units Subcutaneous At Bedtime Inject 22 U SubCut at HS     insulin pen needle (ULTICARE MINI) 31G X 6 MM Use daily or as directed.     order for DME Equipment being ordered: Mattress Overlay for Hospital Bed. Height 65. Weight 168 lbs.  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     order for DME 1 wheelchair     sertraline (ZOLOFT) 50 MG tablet Take 1 tablet (50 mg) by mouth daily     triamcinolone (KENALOG) 0.1 % external cream Apply to sites of dermatitis- the abdomen, armpits, groin as needed.     order for DME Equipment being ordered: TEDS stocking   Below the knee 15-20 mg  Dispense 2  Use daily     ORDER FOR DME Equipment being ordered: Compression stockings, 20-30  MMHG, knee high     vitamin D3 (CHOLECALCIFEROL) 2000 units tablet Take 1 tablet by mouth daily (Patient not taking: Reported on 2/15/2019)     No current facility-administered medications for this visit.       Vitals:  /75   Pulse 63   Temp 97.8  F (36.6  C)   SpO2 97%     Exam:  GEN: Pleasant female in NAD. Daughter present  CARDIAC: RRR  LUNGS: CTA  ABDOMEN: Soft NT  EXT: Left LORI, Right no edema  NEURO: alert. Oriented. No asterixis    LABS:   CMP  Recent Labs   Lab Test 02/15/19  1459 01/18/19  1457 12/07/18  1411 11/01/18  1530    142 143 146*   POTASSIUM 4.3 4.5 4.3 3.8   CHLORIDE 110* 108 111* 114*   CO2 28 28 25 23   ANIONGAP 6 6 7 9   * 166* 117* 131*   BUN 75* 70* 74* 53*   CR 3.53* 3.68* 3.99* 3.17*   GFRESTIMATED 12* 12* 11* 14*   GFRESTBLACK 14* 14* 13* 18*   JODY 8.6 8.5 8.3* 8.6     Recent Labs   Lab Test 07/31/18  1148 07/24/18  0900 07/17/18  0830 06/24/18  0623 06/23/18  2347 03/29/18  1410 11/13/17  0715   BILITOTAL  --   --   --  0.2 0.2 0.2 0.3   ALKPHOS  --   --   --  49 56 56 70   ALT  --   --   --  17 13 15 37   AST 24 24 12 16 12 14 30     CBC  Recent Labs   Lab Test 02/15/19  1459 01/18/19  1457 12/07/18  1411 11/01/18  1530  07/31/18  1148   HGB 10.3* 10.2* 10.3* 9.0*   < > 9.3*   WBC 5.5 5.4 5.7  --   --  5.1   RBC 3.40* 3.47* 3.47*  --   --  3.09*   HCT 31.6* 32.4* 32.3*  --   --  29.1*   MCV 93 93 93  --   --  94   MCH 30.3 29.4 29.7  --   --  30.1   MCHC 32.6 31.5 31.9  --   --  32.0   RDW 12.5 12.4 12.4  --   --  12.8    222 237  --   --  246    < > = values in this interval not displayed.     URINE STUDIES  Recent Labs   Lab Test 03/29/18  1448 01/25/18  0233 11/02/17  0602 10/26/16  1220  12/05/12  1541   COLOR Straw Light Yellow Light Yellow Yellow   < > Yellow   APPEARANCE Clear Clear Clear Slightly Cloudy   < > Clear   URINEGLC 50* Negative 300* >500*   < > 100*   URINEBILI Negative Negative Negative Negative   < > Negative   URINEKETONE Negative  Negative Negative Negative   < > Negative   SG 1.008 1.007 1.010 1.014   < > 1.025   UBLD Negative Negative Negative Negative   < > Small*   URINEPH 5.0 6.5 7.5* 6.0   < > 6.0   PROTEIN >499* 100* >600* >500*   < > 100*   UROBILINOGEN  --   --   --   --   --  0.2   NITRITE Negative Negative Negative Negative   < > Negative   LEUKEST Negative Negative Small* Small*   < > Negative   RBCU 1 0 1 5*   < >  --    WBCU 2 1 44* 54*   < >  --     < > = values in this interval not displayed.     Recent Labs   Lab Test 12/07/18  1417 11/01/18  1533 05/24/18  1441 04/11/18  1025   UTPG 5.60* 6.71* 5.97* 4.98*     PTH  Recent Labs   Lab Test 01/18/19  1457 10/17/18  1413 05/24/18  1435   PTHI 126* 127* 55     IRON STUDIES  Recent Labs   Lab Test 02/15/19  1459 12/07/18  1411 07/18/18  1417   IRON 61 42 76    240 196*   IRONSAT 24 18 39   ELENA 316* 348* 450*       Silva Guerrero, KEIRA

## 2019-02-19 ENCOUNTER — ANCILLARY PROCEDURE (OUTPATIENT)
Dept: CT IMAGING | Facility: CLINIC | Age: 69
End: 2019-02-19
Payer: MEDICARE

## 2019-02-19 ENCOUNTER — OFFICE VISIT (OUTPATIENT)
Dept: PULMONOLOGY | Facility: CLINIC | Age: 69
End: 2019-02-19
Attending: INTERNAL MEDICINE
Payer: MEDICARE

## 2019-02-19 VITALS
BODY MASS INDEX: 30.75 KG/M2 | DIASTOLIC BLOOD PRESSURE: 53 MMHG | TEMPERATURE: 98.4 F | SYSTOLIC BLOOD PRESSURE: 148 MMHG | HEIGHT: 66 IN | RESPIRATION RATE: 18 BRPM | HEART RATE: 67 BPM | OXYGEN SATURATION: 98 %

## 2019-02-19 DIAGNOSIS — R91.8 PULMONARY NODULES: Primary | ICD-10-CM

## 2019-02-19 DIAGNOSIS — R91.8 PULMONARY NODULES: ICD-10-CM

## 2019-02-19 PROCEDURE — G0463 HOSPITAL OUTPT CLINIC VISIT: HCPCS | Mod: ZF

## 2019-02-19 ASSESSMENT — PAIN SCALES - GENERAL: PAINLEVEL: NO PAIN (0)
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
## 2019-02-19 NOTE — NURSING NOTE
"Oncology Rooming Note    February 19, 2019 4:34 PM   Supriya Herr is a 69 year old female who presents for:    Chief Complaint   Patient presents with     Oncology Clinic Visit     Return visit related to Pulmonary Nodules     Initial Vitals: /53 (BP Location: Left arm, Patient Position: Sitting, Cuff Size: Adult Regular)   Pulse 67   Temp 98.4  F (36.9  C) (Tympanic)   Resp 18   Ht 1.676 m (5' 5.98\")   SpO2 98%   BMI 30.75 kg/m   Estimated body mass index is 30.75 kg/m  as calculated from the following:    Height as of this encounter: 1.676 m (5' 5.98\").    Weight as of 11/6/18: 86.4 kg (190 lb 6.4 oz). Body surface area is 2.01 meters squared.  No Pain (0) Comment: Data Unavailable   No LMP recorded. Patient is postmenopausal.  Allergies reviewed: Yes  Medications reviewed: Yes    Medications: Medication refills not needed today.  Pharmacy name entered into [employer]:    [id number] - [place], [place] - [address] [place] [address] HOME MEDICAL SUPPLY [facility] - [place], [place] - [address] [place] [address] MEDICAL EQUIPMENT    Clinical concerns: No new concerns. Provider was notified.      Opal Ramirez LPN            "

## 2019-02-26 DIAGNOSIS — F41.1 GAD (GENERALIZED ANXIETY DISORDER): ICD-10-CM

## 2019-02-26 NOTE — TELEPHONE ENCOUNTER
"Requested Prescriptions   Pending Prescriptions Disp Refills     sertraline (ZOLOFT) 50 MG tablet 90 tablet 3    Last Written Prescription Date:  1/18/19  Last Fill Quantity: 90,  # refills: 3   Last office visit: 11/30/2018 with prescribing provider:  11/30/18   Future Office Visit:   Next 5 appointments (look out 90 days)    Apr 08, 2019  4:00 PM CDT  PHYSICAL with Noam Jackson MD  [facility] [address] [place] [address] [phone]            Sig: Take 1 tablet (50 mg) by mouth daily    SSRIs Protocol Passed - 2/26/2019  2:29 PM       Passed - Recent (12 mo) or future (30 days) visit within the authorizing provider's specialty    Patient had office visit in the last 12 months or has a visit in the next 30 days with authorizing provider or within the authorizing provider's specialty.  See \"Patient Info\" tab in inbasket, or \"Choose Columns\" in Meds & Orders section of the refill encounter.             Passed - Medication is active on med list       Passed - Patient is age 18 or older       Passed - No active pregnancy on record       Passed - No positive pregnancy test in last 12 months          "

## 2019-03-02 DIAGNOSIS — F41.1 GAD (GENERALIZED ANXIETY DISORDER): ICD-10-CM

## 2019-03-04 NOTE — TELEPHONE ENCOUNTER
"Requested Prescriptions   Pending Prescriptions Disp Refills     sertraline (ZOLOFT) 25 MG tablet [Pharmacy Med Name: SERTRALINE HCL 25 MG TABLET] 30 tablet 3    Last Written Prescription Date:  01/18/2019  Last Fill Quantity: 90,  # refills: 3   Last office visit: 11/30/2018 with prescribing provider:  11/30/2018   Future Office Visit:   Next 5 appointments (look out 90 days)    Apr 08, 2019  4:00 PM CDT  PHYSICAL with Noam Jackson MD  [facility] [place] [address] [phone]        Sig: TAKE ONE TABLET BY MOUTH DAILY    SSRIs Protocol Passed - 3/2/2019  8:41 AM       Passed - Recent (12 mo) or future (30 days) visit within the authorizing provider's specialty    Patient had office visit in the last 12 months or has a visit in the next 30 days with authorizing provider or within the authorizing provider's specialty.  See \"Patient Info\" tab in inbasket, or \"Choose Columns\" in Meds & Orders section of the refill encounter.             Passed - Medication is active on med list       Passed - Patient is age 18 or older       Passed - No active pregnancy on record       Passed - No positive pregnancy test in last 12 months        "

## 2019-03-04 NOTE — TELEPHONE ENCOUNTER
Left  for family/pt return call to clinic    What dose is she taking of her sertraline    Dose was increased on 2/15/19 by Silva Guerrero NP with nephrology    Viridiana Sprague RN   [facility]

## 2019-03-05 ENCOUNTER — NURSE TRIAGE (OUTPATIENT)
Dept: NURSING | Facility: CLINIC | Age: 69
End: 2019-03-05

## 2019-03-05 RX ORDER — SERTRALINE HYDROCHLORIDE 25 MG/1
TABLET, FILM COATED ORAL
Qty: 30 TABLET | Refills: 3 | OUTPATIENT
Start: 2019-03-05

## 2019-03-05 NOTE — TELEPHONE ENCOUNTER
Message sent to pharmacy. Sertraline 25 mg tablet discontinued. Please dispense from RX sent 01/18/19 for 50 mg tablet. Please refer to OV note with nephrology 01/18/19 for assessment/plan for depression.    Ana Luisa Garcia RN  [facility]

## 2019-03-06 NOTE — TELEPHONE ENCOUNTER
Reason for Call/Nurse Assessment:  70 y/o female calls about new onset of sinus and cough, cheeks under eyes are swollen over sinus pockets, right eye sticky with feeling of sand in it, scratchy itchy watery eyes, using VICKs vapor rub and cough drops, had patient take temp while we were on the phone, temp is 97.5 - she wants to know what to do or what to take.    RN Action/Disposiiton:  Reviewed protocols for Sinus pain and congestion, advised likely part of a cold, discussed Home Care advice, such as nasal washes, NSAID's, otc for nasal congestion, Afrin spray or Sudafed to help dry up some of the liquid congestion, RN advised to call back with any changes, worsening of symptoms, and questions or concerns. Patient verbalized understanding of and agreement with plan and had no further questions.     Kelly Muse RN - [facility] Nurse Advisor  03/05/2019          8:16PM    Additional Information    Negative: Severe difficulty breathing (e.g., struggling for each breath, speaks in single words)    Negative: Sounds like a life-threatening emergency to the triager    Negative: [1] Difficulty breathing AND [2] not from stuffy nose (e.g., not relieved by cleaning out the nose)    Negative: [1] SEVERE headache AND [2] fever    Negative: [1] Redness or swelling on the cheek, forehead or around the eye AND [2] fever    Negative: Fever > 104 F (40 C)    Negative: Patient sounds very sick or weak to the triager    Negative: [1] SEVERE pain AND [2] not improved 2 hours after pain medicine    Negative: [1] Redness or swelling on the cheek, forehead or around the eye AND [2] no fever    Negative: [1] Fever > 101 F (38.3 C) AND [2] age > 60    Negative: [1] Fever > 101 F (38.3 C) AND [2] bedridden (e.g., nursing home patient, CVA, chronic illness, recovering from surgery)    Negative: [1] Fever > 100.5 F (38.1 C) AND [2] diabetes mellitus or weak immune system (e.g., HIV positive, cancer chemo, splenectomy, organ transplant,  chronic steroids)    Negative: Fever present > 3 days (72 hours)    Negative: [1] Fever returns after gone for over 24 hours AND [2] symptoms worse or not improved    Negative: [1] Sinus pain (not just congestion) AND [2] fever    Negative: Earache    Negative: [1] Sinus congestion (pressure, fullness) AND [2] present > 10 days    Negative: [1] Nasal discharge AND [2] present > 10 days    Negative: [1] Using nasal washes and pain medicine > 24 hours AND [2] sinus pain (around cheekbone or eye) persists    Negative: Lots of coughing    [1] Sinus congestion as part of a cold AND [2] present < 10 days (all triage questions negative)    Protocols used: SINUS PAIN OR CONGESTION-ADULT-

## 2019-03-11 NOTE — TELEPHONE ENCOUNTER
Patient's daughter returned call to clinic. Patient's daughter states the patient is taking 50 mg and they did not request a refill of   sertraline (ZOLOFT) 25 MG tablet.    Thank You!  Cheryl Law RN  Triage Nurse

## 2019-03-22 ENCOUNTER — OFFICE VISIT (OUTPATIENT)
Dept: ORTHOPEDICS | Facility: CLINIC | Age: 69
End: 2019-03-22
Payer: MEDICARE

## 2019-03-22 DIAGNOSIS — B35.1 OM (ONYCHOMYCOSIS): ICD-10-CM

## 2019-03-22 DIAGNOSIS — R60.0 EDEMA LEG: ICD-10-CM

## 2019-03-22 DIAGNOSIS — I73.9 PVD (PERIPHERAL VASCULAR DISEASE) (H): Primary | ICD-10-CM

## 2019-03-22 DIAGNOSIS — E11.40 TYPE 2 DIABETES MELLITUS WITH DIABETIC NEUROPATHY, WITH LONG-TERM CURRENT USE OF INSULIN (H): ICD-10-CM

## 2019-03-22 DIAGNOSIS — Z79.4 TYPE 2 DIABETES MELLITUS WITH DIABETIC NEUROPATHY, WITH LONG-TERM CURRENT USE OF INSULIN (H): ICD-10-CM

## 2019-03-22 NOTE — LETTER
3/22/2019       RE: Supriya Herr  [address]  [facility] [address]     Dear Colleague,    Thank you for referring your patient, Supriya Herr, to the [facility]. Please see a copy of my visit note below.    Chief Complaint   Patient presents with     RECHECK     Diabetic foot care       Allergies   Allergen Reactions     Penicillins Rash     Unasyn Rash     No evidence SJS, but very uncomfortable and precipitated multiple provider visits. Would not use penicillins again if other options available.        Subjective: Supriya is a 69 year old female who presents to the clinic today for a diabetic foot exam and management. Lesion on ankle dx'ed as psoriasis. Relates no new acute LE complaints. She did get her diabetic shoes. Relates that her right leg did get swollen and she has a wound open on the anterior right leg that started as a blister. This has scabbed over.     Objective  Non-palpable DP and PT pulses BL.   Equinus noted BL. Pes planus with rigid toe deformities noted to lesser digits on the right. Left AKA noted.   Nails are thickened, discolored, elongated, with subungual debris consistent with onychomycosis.    Scabbed venous ulcer on the anterior right leg. No s/s of infection.  No open lesion associated. No bleeding. No pain to the area.      Assessment: DM2 with left AKA and neuropathy - presenting for a diabetic foot exam.   Onychomycosis.      Plan:   - Pt seen and evaluated  - Nails debrided x 5.  - Rx for open toes compression socks.  - See again in 3 months.    Again, thank you for allowing me to participate in the care of your patient.      Sincerely,    Eleazar Pack DPM

## 2019-04-05 ENCOUNTER — OFFICE VISIT (OUTPATIENT)
Dept: ENDOCRINOLOGY | Facility: CLINIC | Age: 69
End: 2019-04-05
Payer: MEDICARE

## 2019-04-05 ENCOUNTER — TELEPHONE (OUTPATIENT)
Dept: OPHTHALMOLOGY | Facility: CLINIC | Age: 69
End: 2019-04-05

## 2019-04-05 ENCOUNTER — OFFICE VISIT (OUTPATIENT)
Dept: OPHTHALMOLOGY | Facility: CLINIC | Age: 69
End: 2019-04-05
Payer: MEDICARE

## 2019-04-05 VITALS
SYSTOLIC BLOOD PRESSURE: 126 MMHG | HEART RATE: 62 BPM | WEIGHT: 192 LBS | BODY MASS INDEX: 31 KG/M2 | DIASTOLIC BLOOD PRESSURE: 72 MMHG

## 2019-04-05 DIAGNOSIS — H25.811 COMBINED FORMS OF AGE-RELATED CATARACT OF RIGHT EYE: ICD-10-CM

## 2019-04-05 DIAGNOSIS — Z96.1 PSEUDOPHAKIA OF LEFT EYE: ICD-10-CM

## 2019-04-05 DIAGNOSIS — Z79.4 TYPE 2 DIABETES MELLITUS WITH DIABETIC NEUROPATHY, WITH LONG-TERM CURRENT USE OF INSULIN (H): Primary | ICD-10-CM

## 2019-04-05 DIAGNOSIS — E11.40 TYPE 2 DIABETES MELLITUS WITH DIABETIC NEUROPATHY, WITH LONG-TERM CURRENT USE OF INSULIN (H): Primary | ICD-10-CM

## 2019-04-05 DIAGNOSIS — E11.3393 MODERATE NONPROLIFERATIVE DIABETIC RETINOPATHY OF BOTH EYES WITHOUT MACULAR EDEMA ASSOCIATED WITH TYPE 2 DIABETES MELLITUS (H): Primary | ICD-10-CM

## 2019-04-05 LAB — HBA1C MFR BLD: 6 % (ref 4.3–6)

## 2019-04-05 ASSESSMENT — VISUAL ACUITY
OS_CC: 20/30
OD_CC+: +
METHOD: SNELLEN - LINEAR
CORRECTION_TYPE: GLASSES
OD_CC: 20/50
OS_CC+: -2
OD_PH_CC: 20/30-2

## 2019-04-05 ASSESSMENT — TONOMETRY
OD_IOP_MMHG: 9
IOP_METHOD: ICARE
OS_IOP_MMHG: 9

## 2019-04-05 ASSESSMENT — CUP TO DISC RATIO
OD_RATIO: 0.3
OS_RATIO: 0.25

## 2019-04-05 ASSESSMENT — CONF VISUAL FIELD
OS_NORMAL: 1
OD_NORMAL: 1
METHOD: COUNTING FINGERS

## 2019-04-05 ASSESSMENT — EXTERNAL EXAM - LEFT EYE: OS_EXAM: NORMAL

## 2019-04-05 ASSESSMENT — PAIN SCALES - GENERAL: PAINLEVEL: NO PAIN (0)

## 2019-04-05 ASSESSMENT — SLIT LAMP EXAM - LIDS
COMMENTS: NORMAL
COMMENTS: NORMAL

## 2019-04-05 ASSESSMENT — EXTERNAL EXAM - RIGHT EYE: OD_EXAM: NORMAL
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
## 2019-04-05 NOTE — NURSING NOTE
Chief Complaints and History of Present Illnesses   Patient presents with     Annual Eye Exam     Chief Complaint(s) and History of Present Illness(es)     Annual Eye Exam     Laterality: both eyes    Onset: months ago    Frequency: constantly    Timing: throughout the day    Context: near vision    Course: stable    Associated symptoms: Negative for tearing and eye pain    Treatments tried: no treatments    Pain scale: 0/10              Comments     In November had some tearing and goop in eyes. Does not want to get new glasses because it is expensive. Current bifocals not great for near. Patient denies eye pain or irritation. Does not use any eye drops.      Andreia Do COT 12:37 PM April 5, 2019

## 2019-04-05 NOTE — PATIENT INSTRUCTIONS
1. Pt's A1C is good at 6.0 % today and her blood sugar readings are good.  2. No change in insulin doses today.  3. Pt's BP good today= 122/76.  4. See me in 3 months.  Arabella Kamara PA-C
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
## 2019-04-05 NOTE — PROGRESS NOTES
Assessment & Plan       Supriya Herr is a 69 year old female with the following diagnoses:   1. Moderate nonproliferative diabetic retinopathy of both eyes without macular edema associated with type 2 diabetes mellitus (H) - Both Eyes    2. Combined forms of age-related cataract of right eye - Right Eye    3. Pseudophakia of left eye - Left Eye       DBR consult  Cataract removal needed  IOL clear and centered    Consult retinal clinic [facility] then cataract extraction [facility]       Patient disposition:   Return for Follow Up.          Complete documentation of historical and exam elements from today's encounter can be found in the full encounter summary report (not reduplicated in this progress note). I personally obtained the chief complaint(s) and history of present illness.  I confirmed and edited as necessary the review of systems, past medical/surgical history, family history, social history, and examination findings as documented by others; and I examined the patient myself. I personally reviewed the relevant tests, images, and reports as documented above. I formulated and edited as necessary the assessment and plan and discussed the findings and management plan with the patient and family.  Dr. Lauri Baeza

## 2019-04-08 ENCOUNTER — OFFICE VISIT (OUTPATIENT)
Dept: FAMILY MEDICINE | Facility: CLINIC | Age: 69
End: 2019-04-08
Payer: MEDICARE

## 2019-04-08 VITALS
DIASTOLIC BLOOD PRESSURE: 72 MMHG | TEMPERATURE: 98.3 F | OXYGEN SATURATION: 98 % | SYSTOLIC BLOOD PRESSURE: 131 MMHG | HEART RATE: 67 BPM

## 2019-04-08 DIAGNOSIS — Z79.4 TYPE 2 DIABETES MELLITUS WITH DIABETIC NEUROPATHY, WITH LONG-TERM CURRENT USE OF INSULIN (H): ICD-10-CM

## 2019-04-08 DIAGNOSIS — Z00.00 ROUTINE GENERAL MEDICAL EXAMINATION AT A HEALTH CARE FACILITY: Primary | ICD-10-CM

## 2019-04-08 DIAGNOSIS — N18.5 CKD (CHRONIC KIDNEY DISEASE) STAGE 5, GFR LESS THAN 15 ML/MIN (H): ICD-10-CM

## 2019-04-08 DIAGNOSIS — B37.2 INTERTRIGINOUS CANDIDIASIS: ICD-10-CM

## 2019-04-08 DIAGNOSIS — E11.40 TYPE 2 DIABETES MELLITUS WITH DIABETIC NEUROPATHY, WITH LONG-TERM CURRENT USE OF INSULIN (H): ICD-10-CM

## 2019-04-08 DIAGNOSIS — F33.1 MODERATE RECURRENT MAJOR DEPRESSION (H): ICD-10-CM

## 2019-04-08 PROCEDURE — 90732 PPSV23 VACC 2 YRS+ SUBQ/IM: CPT | Performed by: FAMILY MEDICINE

## 2019-04-08 PROCEDURE — G0009 ADMIN PNEUMOCOCCAL VACCINE: HCPCS | Performed by: FAMILY MEDICINE

## 2019-04-08 PROCEDURE — G0439 PPPS, SUBSEQ VISIT: HCPCS | Performed by: FAMILY MEDICINE

## 2019-04-08 RX ORDER — FUROSEMIDE 80 MG
60 TABLET ORAL EVERY MORNING
COMMUNITY
End: 2019-08-02

## 2019-04-08 NOTE — PROGRESS NOTES
"  SUBJECTIVE:   Supriya Herr is a 69 year old female who presents for Preventive Visit.   just saw endocrine, labs stable  Are you in the first 12 months of your Medicare Part B coverage?  No    Physical Health:    In general, how would you rate your overall physical health? fair    Outside of work, how many days during the week do you exercise? 2-3 days/week    Outside of work, approximately how many minutes a day do you exercise?15-30 minutes    If you drink alcohol do you typically have >3 drinks per day or >7 drinks per week? No    Do you usually eat at least 4 servings of fruit and vegetables a day, include whole grains & fiber and avoid regularly eating high fat or \"junk\" foods? NO    Do you have any problems taking medications regularly?  No    Do you have any side effects from medications? none    Needs assistance for the following daily activities: transportation, shopping, preparing meals, housework, laundry, money management and taking medicine    Which of the following safety concerns are present in your home?  none identified     Hearing impairment: No    In the past 6 months, have you been bothered by leaking of urine? yes    Mental Health:    In general, how would you rate your overall mental or emotional health? fair  PHQ-2 Score:      Do you feel safe in your environment? Yes    Do you have a Health Care Directive? Yes: Advance Directive has been received and scanned.    Additional concerns to address?  YES    Fall risk:       Cognitive Screenin) Repeat 3 items (Leader, Season, Table)    2) Clock draw: NORMAL  3) 3 item recall: Recalls 3 objects  Results: 3 items recalled: COGNITIVE IMPAIRMENT LESS LIKELY    Mini-CogTM Copyright CLYDE Mosquera. Licensed by the author for use in [facility]; reprinted with permission (chuy@.[facility]). All rights reserved.      Do you have sleep apnea, excessive snoring or daytime drowsiness?: yes        Diabetes Follow-up         Diabetic concerns: " None     Symptoms of hypoglycemia (low blood sugar): none          Date of last diabetic eye exam: last week    BP Readings from Last 2 Encounters:   19 131/72   19 126/72     Hemoglobin A1C (%)   Date Value   2018 6.0 (H)   2018 5.4     LDL Cholesterol Calculated (mg/dL)   Date Value   2018 108 (H)   2017 68       Diabetes Management Resources  Hypertension Follow-up      Outpatient blood pressures are not being checked.    Low Salt Diet: no added salt    Heart Failure Follow-up    Symptoms:    Shortness of breath: happens with exertion only - stable    Lower extremity edema: none    Chest pain: No    Using more pillows than normal: No    Cough at night: No     Depression Followup    Status since last visit: Improved as she has felt better    See PHQ-9 for current symptoms.  Other associated symptoms: None    Complicating factors:   Significant life event:  Yes-  Kidney failure   Current substance abuse:  None  Anxiety or Panic symptoms:  No    PHQ 2016   PHQ-9 Total Score 0   Q9: Thoughts of better off dead/self-harm past 2 weeks Not at all       PHQ-9  English  PHQ-9   Any Language  Suicide Assessment Five-step Evaluation and Treatment (SAFE-T)  Chronic Kidney Disease Follow-up      Current NSAID use?  No      Reviewed and updated as needed this visit by clinical staff         Reviewed and updated as needed this visit by Provider        Social History     Tobacco Use     Smoking status: Light Tobacco Smoker     Packs/day: 0.50     Years: 52.00     Pack years: 26.00     Types: Cigarettes     Start date: 1964     Last attempt to quit: 2017     Years since quittin.4     Smokeless tobacco: Never Used     Tobacco comment: 1 per day or less   Substance Use Topics     Alcohol use: No                           Current providers sharing in care for this patient include:   Patient Care Team:  Noam Jackson MD as PCP - General (Family Practice)  Jud WOMACK  CM [facility]  Noam Jackson MD as Referring Physician (Family Practice)  Kathryn Basilio MD as MD (Nephrology)  Noam Jackson MD as Assigned PCP  Pravin Cordova, SHANTA as Nurse Coordinator (Nephrology)  Eleazar Pack DPM as MD (Podiatrist Primary Podiatric Medicine)  Alvaro Gautam MD as MD (Orthopedics)  Erick Pérez as Home Care Nurse  Marisel as Home Care Nurse  Lita Reynoso MD as MD (Dermatology)  [facility] ([facility] ([facility] ([facility]  Haven Che MD (Dermatology)  Eleazar Pack DPM as Referring Physician (Podiatrist Primary Podiatric Medicine)  Silva Guerrero NP as Nurse Practitioner (Nurse Practitioner - Adult Health)  Lauir Baeza OD (Optometry)    The following health maintenance items are reviewed in Epic and correct as of today:  Health Maintenance   Topic Date Due     HF ACTION PLAN Q3 YR  1950     COPD ACTION PLAN Q1 YR  1950     FIT Q1 YR  01/16/1959     DEXA SCAN SCREENING (SYSTEM ASSIGNED)  01/16/2014     ZOSTER IMMUNIZATION (2 of 3) 10/08/2015     PHQ-9 Q6 MONTHS  05/09/2017     MICROALBUMIN Q1 YEAR  01/09/2019     LIPID MONITORING Q1 YEAR  03/29/2019     MEDICARE ANNUAL WELLNESS VISIT  03/30/2019     ALT Q1 YR  06/24/2019     FALL RISK ASSESSMENT  07/03/2019     BMP Q6 MOS  08/15/2019     FOOT EXAM Q1 YEAR  08/21/2019     A1C Q6 MO  10/05/2019     TSH W/ FREE T4 REFLEX Q2 YEAR  01/09/2020     BMP Q1 YR  02/15/2020     CBC Q1 YR  02/15/2020     EYE EXAM Q1 YEAR  04/05/2020     MAMMO SCREEN Q2 YR (SYSTEM ASSIGNED)  04/23/2020     DTAP/TDAP/TD IMMUNIZATION (3 - Td) 07/18/2023     ADVANCE DIRECTIVE PLANNING Q5 YRS  10/26/2023     SPIROMETRY ONETIME  Completed     DEPRESSION ACTION PLAN  Completed     INFLUENZA VACCINE  Completed     HEPATITIS C SCREENING  Completed     IPV IMMUNIZATION  Aged Out     MENINGITIS IMMUNIZATION  Aged Out     Labs reviewed in EPIC  Pneumonia  "Vaccine:Adults age 65+ who received Pneumovax (PPSV23) at 65 years or older: Should be given PCV13 > 1 year after their most recent PPSV23  Mammogram Screening: Mammogram Screening: Patient over age 50, mutual decision to screen reflected in health maintenance.  Declines colon cancer screening  ROS:  Constitutional, HEENT, cardiovascular, pulmonary, gi and gu systems are negative, except as otherwise noted.    OBJECTIVE:   /72   Pulse 67   Temp 98.3  F (36.8  C) (Oral)   SpO2 98%  Estimated body mass index is 31 kg/m  as calculated from the following:    Height as of 2/19/19: 1.676 m (5' 5.98\").    Weight as of 4/5/19: 87.1 kg (192 lb).  EXAM:   GENERAL: alert, no distress, frail, elderly and fatigued  EYES: Eyes grossly normal to inspection, PERRL and conjunctivae and sclerae normal  HENT: ear canals and TM's normal, nose and mouth without ulcers or lesions  NECK: no adenopathy, no asymmetry, masses, or scars and thyroid normal to palpation  RESP: lungs clear to auscultation - no rales, rhonchi or wheezes  BREAST: normal without masses, tenderness or nipple discharge and no palpable axillary masses or adenopathy  CV: regular rates and rhythm, normal S1 S2, no S3 or S4 and no peripheral edema  ABDOMEN: soft, nontender, no hepatosplenomegaly, no masses and bowel sounds normal  SKIN: erythematous scaley patches between and under breast   she declines a pelvic or groin skin exam -   NEURO: mentation intact  PSYCH: mentation appears normal, affect normal/bright    Diagnostic Test Results:  Results for orders placed or performed in visit on 04/05/19   Hemoglobin A1c POCT   Result Value Ref Range    Hemoglobin A1C 6.0 4.3 - 6 %     *Note: Due to a large number of results and/or encounters for the requested time period, some results have not been displayed. A complete set of results can be found in Results Review.       ASSESSMENT / PLAN:   1. Routine general medical examination at a health care facility  Doing " "better overall with better qaulity of life    2. Type 2 diabetes mellitus with diabetic neuropathy, with long-term current use of insulin (H)  Now with e  Follow up with consultant as planned.     3. Moderate recurrent major depression (H)  Improved, janice=tune medications     4. CKD (chronic kidney disease) stage 5, GFR less than 15 ml/min (H)  Unchanged  Follow up with consultant as planned.     5. Intertriginous candidiasis  Has creams but declines to use them        End of Life Planning:  Patient currently has an advanced directive: No.  I have verified the patient's ablity to prepare an advanced directive/make health care decisions.  Literature was provided to assist patient in preparing an advanced directive.    COUNSELING:  Reviewed preventive health counseling, as reflected in patient instructions       Healthy diet/nutrition       Vision screening       Hearing screening       Dental care       Bladder control       Fall risk prevention       Immunizations    Vaccinated for: Pneumococcal             Osteoporosis Prevention/Bone Health    BP Readings from Last 1 Encounters:   04/08/19 131/72     Estimated body mass index is 31 kg/m  as calculated from the following:    Height as of 2/19/19: 1.676 m (5' 5.98\").    Weight as of 4/5/19: 87.1 kg (192 lb).           reports that she quit smoking cigarettes.  She started smoking about 55 years ago. She has a 26.00 pack-year smoking history. She has never used smokeless tobacco.      Appropriate preventive services were discussed with this patient, including applicable screening as appropriate for cardiovascular disease, diabetes, osteopenia/osteoporosis, and glaucoma.  As appropriate for age/gender, discussed screening for colorectal cancer, prostate cancer, breast cancer, and cervical cancer. Checklist reviewing preventive services available has been given to the patient.    Reviewed patients plan of care and provided an AVS. The Intermediate Care Plan ( asthma " action plan, low back pain action plan, and migraine action plan) for Supriya meets the Care Plan requirement. This Care Plan has been established and reviewed with the Patient and daughter.    Counseling Resources:  ATP IV Guidelines  Pooled Cohorts Equation Calculator  Breast Cancer Risk Calculator  FRAX Risk Assessment  ICSI Preventive Guidelines  Dietary Guidelines for Americans, 2010  USDA's MyPlate  ASA Prophylaxis  Lung CA Screening    Noam Jackson MD  [facility] [place]

## 2019-04-08 NOTE — PATIENT INSTRUCTIONS
Patient Education   Personalized Prevention Plan  You are due for the preventive services outlined below.  Your care team is available to assist you in scheduling these services.  If you have already completed any of these items, please share that information with your care team to update in your medical record.  Health Maintenance Due   Topic Date Due     Heart Failure Action Plan Reviewed Every 3 Years  1950     COPD ACTION PLAN Q1 YR  1950     Colon Cancer Screening - FIT Test - yearly  01/16/1959     Bone Density Screening (Dexa)  01/16/2014     Zoster (Shingles) Vaccine (2 of 3) 10/08/2015     Depression Assessment - every 6 months  05/09/2017     Microalbumin Lab - yearly  01/09/2019     Cholesterol Lab - yearly  03/29/2019     Annual Wellness Visit  03/30/2019

## 2019-04-09 DIAGNOSIS — E11.3393: Primary | ICD-10-CM

## 2019-04-11 ENCOUNTER — OFFICE VISIT (OUTPATIENT)
Dept: OPHTHALMOLOGY | Facility: CLINIC | Age: 69
End: 2019-04-11
Attending: OPHTHALMOLOGY
Payer: MEDICARE

## 2019-04-11 ENCOUNTER — NURSE TRIAGE (OUTPATIENT)
Dept: NURSING | Facility: CLINIC | Age: 69
End: 2019-04-11

## 2019-04-11 DIAGNOSIS — Z96.1 PSEUDOPHAKIA OF LEFT EYE: ICD-10-CM

## 2019-04-11 DIAGNOSIS — E11.3393: ICD-10-CM

## 2019-04-11 DIAGNOSIS — E11.3393 MODERATE NONPROLIFERATIVE DIABETIC RETINOPATHY OF BOTH EYES WITHOUT MACULAR EDEMA ASSOCIATED WITH TYPE 2 DIABETES MELLITUS (H): Primary | ICD-10-CM

## 2019-04-11 DIAGNOSIS — H25.013 CORTICAL AGE-RELATED CATARACT OF BOTH EYES: ICD-10-CM

## 2019-04-11 PROCEDURE — G0463 HOSPITAL OUTPT CLINIC VISIT: HCPCS | Mod: ZF,25

## 2019-04-11 PROCEDURE — 92134 CPTRZ OPH DX IMG PST SGM RTA: CPT | Mod: ZF | Performed by: OPHTHALMOLOGY

## 2019-04-11 PROCEDURE — 92250 FUNDUS PHOTOGRAPHY W/I&R: CPT | Mod: ZF,59 | Performed by: OPHTHALMOLOGY

## 2019-04-11 PROCEDURE — 92235 FLUORESCEIN ANGRPH MLTIFRAME: CPT | Mod: ZF | Performed by: OPHTHALMOLOGY

## 2019-04-11 ASSESSMENT — REFRACTION_WEARINGRX
OS_CYLINDER: +2.00
OS_AXIS: 134
OD_SPHERE: -4.75
OS_SPHERE: -3.50
SPECS_TYPE: BIFOCAL
OD_ADD: +2.75
OD_AXIS: 093
OD_CYLINDER: +2.25
OS_ADD: +2.75

## 2019-04-11 ASSESSMENT — CONF VISUAL FIELD
OS_NORMAL: 1
OD_NORMAL: 1
METHOD: COUNTING FINGERS

## 2019-04-11 ASSESSMENT — VISUAL ACUITY
OD_CC: 20/60
OS_CC: 20/40
CORRECTION_TYPE: GLASSES
METHOD: SNELLEN - LINEAR
OD_CC+: +1

## 2019-04-11 ASSESSMENT — CUP TO DISC RATIO
OS_RATIO: 0.3
OD_RATIO: 0.3

## 2019-04-11 ASSESSMENT — TONOMETRY
IOP_METHOD: TONOPEN
OS_IOP_MMHG: 12
OD_IOP_MMHG: 13

## 2019-04-11 ASSESSMENT — SLIT LAMP EXAM - LIDS
COMMENTS: NORMAL
COMMENTS: NORMAL

## 2019-04-11 NOTE — PROGRESS NOTES
CC: retina hemes  HPI: Supriya Herr is a  69 year old year-old patient referredf by Dr Baeza for evaluation and treat of DMII with mild nonproliferative diabetic retinopathy and cataract.     PMH:  History of Diabetes mellitus on insuline. HbA1c 6.0 4.19  Past ocular history: History of cataract surgery left eye.   history of Macular hole left eye. Status post Pars plana vitrectomy, membrane peel, air-fluid exchange, SF6 gas infusion, left eye 2/14/2012 by Dr. Daniel     Retinal Imaging:  OCT 4-11-19  RE: few foveal cystic changes; retina thinning and partial thickness Macula hole   LE:  Retina irregularity, trace sup Epiretinal membrane; retina thinning     optos pic 04/11/19 consistent with exam    fluorescein angiography: 04/11/19   Right eye: blockage of fluorescein angiography on the areas of heme; few microaneurysms; mild late Diabetic macular edema and PP leakage  No neovascularization elsewhere; no neovascularization of the disc.  Left eye: few microaneurysms; mild late Diabetic macular edema; staining of the peripheral Chorioretinal  scars    Assessment & Plan:  1. Moderate nonproliferative diabetic retinopathy and mild Diabetic macular edema both eyes  Consider to observe for now   Could consider treatment with laser or anti-vegf if worsening   Blood pressure (<120/80) and blood glucose (HbA1c <7.0) control discussed with patient. Patient advised that failure to adequately control each may lead to vision loss. The patient expressed understanding.    2. Mod Hypertensive retinopathy   blood pressure control     3. Cataract right eye   Becoming visually significant   Will need cataract evaluation   intraocular lens calcs and BAT     4. Pseudophakia left eye  Observe    5. History of Macula hole repair left eye by Dr. Daniel  Based on care everywhere records   Macula hole closed  observe     Plan:  Recommend follow up in 3 months with Optical Coherence Tomography, intraocular lens calcs; prescription    Sooner as needed     ~~~~~~~~~~~~~~~~~~~~~~~~~~~~~~~~~~   Complete documentation of historical and exam elements from today's encounter can be found in the full encounter summary report (not reduplicated in this progress note).  I personally obtained the chief complaint(s) and history of present illness.  I confirmed and edited as necessary the review of systems, past medical/surgical history, family history, social history, and examination findings as documented by others; and I examined the patient myself.  I personally reviewed the relevant tests, images, and reports as documented above.  I formulated and edited as necessary the assessment and plan and discussed the findings and management plan with the patient and family    Leanne Jett MD   of Ophthalmology.  Retina Service   Department of Ophthalmology and Visual Neurosciences   [facility]  Phone: [phone]   Fax: [phone]

## 2019-04-11 NOTE — LETTER
4/11/2019       RE: Supriya Herr  [address]  [facility] [address]     Dear Lauri,    Thank you for referring your patient, Supriya Herr, to the EYE CLINIC at [facility]. Please see a copy of my visit note below.     CC: retina hemes  HPI: Supriya Herr is a  69 year old year-old patient referredf by Dr Baeza for evaluation and treat of DMII with mild nonproliferative diabetic retinopathy and cataract.     PMH:  History of Diabetes mellitus on insuline. HbA1c 6.0 4.19  Past ocular history: History of cataract surgery left eye.   history of Macular hole left eye. Status post Pars plana vitrectomy, membrane peel, air-fluid exchange, SF6 gas infusion, left eye 2/14/2012 by Dr. Daniel     Retinal Imaging:  OCT 4-11-19  RE: few foveal cystic changes; retina thinning and partial thickness Macula hole   LE:  Retina irregularity, trace sup Epiretinal membrane; retina thinning     optos pic 04/11/19 consistent with exam    fluorescein angiography: 04/11/19   Right eye: blockage of fluorescein angiography on the areas of heme; few microaneurysms; mild late Diabetic macular edema and PP leakage  No neovascularization elsewhere; no neovascularization of the disc.  Left eye: few microaneurysms; mild late Diabetic macular edema; staining of the peripheral Chorioretinal  scars    Assessment & Plan:  1. Moderate nonproliferative diabetic retinopathy and mild Diabetic macular edema both eyes  Consider to observe for now   Could consider treatment with laser or anti-vegf if worsening   Blood pressure (<120/80) and blood glucose (HbA1c <7.0) control discussed with patient. Patient advised that failure to adequately control each may lead to vision loss. The patient expressed understanding.    2. Mod Hypertensive retinopathy   blood pressure control     3. Cataract right eye   Becoming visually significant   Will need cataract evaluation   intraocular lens calcs and BAT     4.  Pseudophakia left eye  Observe    5. History of Macula hole repair left eye by Dr. Daniel  Based on care everywhere records   Macula hole closed  observe     Plan:  Recommend follow up in 3 months with Optical Coherence Tomography, intraocular lens calcs; prescription   Sooner as needed     Again, thank you for allowing me to participate in the care of your patient.      Sincerely,      Leanne Jett MD   of Ophthalmology.  Retina Service   Department of Ophthalmology and Visual Neurosciences   [facility]  Phone: [phone]   Fax: [phone]

## 2019-04-11 NOTE — NURSING NOTE
"Chief Complaint(s) and History of Present Illness(es)     Retinal Evaluation     In both eyes.  Associated symptoms include dryness and tearing.  Negative for eye pain and redness.              Comments     Pt is here for a retinal evaluation for Moderate nonproliferative diabetic retinopathy of both eyes without macular edema associated with type 2 diabetes, and cataract in RE. Pt notes vision is about the same since her last visit. Pt notes she has to take glasses off to read small print. Pt also c/o BE, RE>LE tearing x last 2 months. Pt notes along with the tearing \"crusty\"stuff forms on the outer corner of the RE only x 2 months. Pt also notes itching in BE x last 2 months as well. Pt denies any use of eye drops.     A1C 6.1 taken last week     Lab Results       Component                Value               Date                       A1C                      6.0                 06/22/2018                 A1C                      5.4                 03/29/2018                 A1C                      6.8                 01/09/2018                 A1C                      9.6                 06/09/2017                 A1C                      6.9                 02/14/2017                Sherice Goyal, [facility] 7:23 AM April 11, 2019                       "

## 2019-04-12 NOTE — TELEPHONE ENCOUNTER
Pt states she needs a new battery for her One Touch Ultra II. Advised pt batteries are purchased by pt; no Rx needed. She removed current battery. Advised pt take this along to the store to buy right size replacement.    She states she needs Rx for test strips also. She will call during clinic hours for this. Carlie Glez RN/BINDU          Reason for Disposition    [1] Caller requesting NON-URGENT health information AND [2] PCP's office is the best resource    Additional Information    Negative: [1] Caller is not with the adult (patient) AND [2] reporting urgent symptoms    Negative: Lab result questions    Negative: Medication questions    Negative: Caller cannot be reached by phone    Negative: Caller has already spoken to PCP or another triager    Negative: RN needs further essential information from caller in order to complete triage    Negative: Requesting regular office appointment    Protocols used: INFORMATION ONLY CALL-[facility]

## 2019-04-23 DIAGNOSIS — N18.5 CKD (CHRONIC KIDNEY DISEASE) STAGE 5, GFR LESS THAN 15 ML/MIN (H): Primary | ICD-10-CM

## 2019-04-24 ENCOUNTER — OFFICE VISIT (OUTPATIENT)
Dept: NEPHROLOGY | Facility: CLINIC | Age: 69
End: 2019-04-24
Attending: INTERNAL MEDICINE
Payer: MEDICARE

## 2019-04-24 VITALS — OXYGEN SATURATION: 97 % | SYSTOLIC BLOOD PRESSURE: 159 MMHG | DIASTOLIC BLOOD PRESSURE: 73 MMHG | HEART RATE: 66 BPM

## 2019-04-24 DIAGNOSIS — R80.1 PERSISTENT PROTEINURIA: ICD-10-CM

## 2019-04-24 DIAGNOSIS — D63.1 ANEMIA IN STAGE 5 CHRONIC KIDNEY DISEASE, NOT ON CHRONIC DIALYSIS (H): ICD-10-CM

## 2019-04-24 DIAGNOSIS — N18.5 CKD (CHRONIC KIDNEY DISEASE) STAGE 5, GFR LESS THAN 15 ML/MIN (H): Primary | ICD-10-CM

## 2019-04-24 DIAGNOSIS — N18.5 CKD (CHRONIC KIDNEY DISEASE) STAGE 5, GFR LESS THAN 15 ML/MIN (H): ICD-10-CM

## 2019-04-24 DIAGNOSIS — N18.5 ANEMIA IN STAGE 5 CHRONIC KIDNEY DISEASE, NOT ON CHRONIC DIALYSIS (H): ICD-10-CM

## 2019-04-24 DIAGNOSIS — D50.9 IRON DEFICIENCY ANEMIA, UNSPECIFIED IRON DEFICIENCY ANEMIA TYPE: ICD-10-CM

## 2019-04-24 LAB
ALBUMIN SERPL-MCNC: 2.6 G/DL (ref 3.4–5)
ANION GAP SERPL CALCULATED.3IONS-SCNC: 9 MMOL/L (ref 3–14)
BUN SERPL-MCNC: 98 MG/DL (ref 7–30)
CALCIUM SERPL-MCNC: 8.7 MG/DL (ref 8.5–10.1)
CHLORIDE SERPL-SCNC: 110 MMOL/L (ref 94–109)
CO2 SERPL-SCNC: 23 MMOL/L (ref 20–32)
CREAT SERPL-MCNC: 4.52 MG/DL (ref 0.52–1.04)
FERRITIN SERPL-MCNC: 298 NG/ML (ref 8–252)
GFR SERPL CREATININE-BSD FRML MDRD: 9 ML/MIN/{1.73_M2}
GLUCOSE SERPL-MCNC: 176 MG/DL (ref 70–99)
HGB BLD-MCNC: 8.2 G/DL (ref 11.7–15.7)
IRON SATN MFR SERPL: 15 % (ref 15–46)
IRON SERPL-MCNC: 34 UG/DL (ref 35–180)
PHOSPHATE SERPL-MCNC: 5.6 MG/DL (ref 2.5–4.5)
POTASSIUM SERPL-SCNC: 4.1 MMOL/L (ref 3.4–5.3)
SODIUM SERPL-SCNC: 142 MMOL/L (ref 133–144)
TIBC SERPL-MCNC: 227 UG/DL (ref 240–430)

## 2019-04-24 PROCEDURE — 85018 HEMOGLOBIN: CPT | Performed by: INTERNAL MEDICINE

## 2019-04-24 PROCEDURE — G0463 HOSPITAL OUTPT CLINIC VISIT: HCPCS | Mod: ZF

## 2019-04-24 PROCEDURE — 83540 ASSAY OF IRON: CPT | Performed by: INTERNAL MEDICINE

## 2019-04-24 PROCEDURE — 36415 COLL VENOUS BLD VENIPUNCTURE: CPT | Performed by: INTERNAL MEDICINE

## 2019-04-24 PROCEDURE — 82728 ASSAY OF FERRITIN: CPT | Performed by: INTERNAL MEDICINE

## 2019-04-24 PROCEDURE — 80069 RENAL FUNCTION PANEL: CPT | Performed by: INTERNAL MEDICINE

## 2019-04-24 PROCEDURE — 83550 IRON BINDING TEST: CPT | Performed by: INTERNAL MEDICINE

## 2019-04-24 ASSESSMENT — PAIN SCALES - GENERAL: PAINLEVEL: MODERATE PAIN (4)

## 2019-04-24 NOTE — PROGRESS NOTES
Nephrology Clinic Visit 2/15/19    Assessment and Plan:     1. CKD5 w/proteinuria- eGFR 12 but today down to 9. She is not symptomatically significantly changed, but azotemia worse  - will see if surgery needs any repeat US or just a matter of scheduling surgery   - RTC in a few weeks and likely need to proceed with surgery unless renal function shows improvement  - has fatigue   - Inactive on transplant wait list   - Will need AVG when time comes to initiate HD    2. Volume status - Euvolemic. No significant edema. Blood pressures higher than goal.    - continue same furosemide dose, can consider increasing if BP remains higher     3. HTN - Improved control. Clinic blood pressures 132-154/75-77. Outside blood pressures in the 130-140/ range. HR 63  Current regimen:    - lasix 60/40   - Coreg 12.5 mg bid   - Amlodipine 5 mg every day - can increase to 10mg (? More swelling)      - Will continue current regimen      - Did not increase Coreg due to HR in the 60's    - Continue home monitoring   - Blood pressure goal < 130/80     4. Electrolytes - No acute concerns. K 4.3, Na 144     5. Acid base - No acute concerns. Bicarb stable     6. BMD - Ca corrected 9.5, Phos 4.2, Albumin 2.8   - Vit D 27 (10/18)   -  (1/19)   - On Calcitriol and Cholecalciferol. Daughter misunderstood and patient was only getting Calcitriol. Will resume Meseret as well.      7. Anemia - Hgb down signifcantly  - iron sat low, thus will set up IV injectafer  - No need for KELLIE at this time   - Colonoscopy 2018, tubular adenomas     8. DM2 - Well controlled. A1c 5.4%. On Insulin      9. Depression - on zoloft     10. Disposition - RTC in 2-4 weeks with me or Ethel.   - spoke with daughter on the phone.      Assessment and plan was discussed with patient and she voiced her understanding and agreement.     Reason for Visit:  CKD5/HTN f/u     HPI:  Ms Herr is a 68 yo female with CKD5 and nephrotic range proteinuria, DM2, HTN, in today for  routine CKD f/u. Last seen by me on 19. Amlodipine was started given elevated blood pressures and Zoloft increased due to emotional lability. Baseline creat in the 3-4 range since   She had consult for AVG 2018   She is eating 'too well'  Her Scr today is up a little and GFR is 9, BUN in 90s. She does not seem hypovolemic, in fact BP is higher than goal.  Her blood sugars are great. Her potassium is great.  No hospital admissions or illness since last visit      ROS: 4 point ROS neg other than the symptoms noted above in the HPI.         Chronic Health Problems:     CKD5  Proteinuria  AKA, left (10/18)  T2DM  Vit D def  HTN  HLD  Anxiety/depression  Vitiligo  Non proliferative diabetic retinopathy  BCC  JONE  Anemia  Prior tobacco abuse     Personal Hx:   Lives in lower level of duplex, daughter upper level. NS    Family Hx:   Family History   Problem Relation Age of Onset     Diabetes Mother      Hypertension Mother      Heart Disease Mother          of congestive heart failure     Eye Disorder Mother      Arthritis Mother      Obesity Mother      Heart Disease Father          from CHF     Cerebrovascular Disease Father      Arthritis Father      Musculoskeletal Disorder Other         has MS     Thyroid Disease Other      Eye Disorder Other         cataracts     Cancer Other         throat/liver     Skin Cancer No family hx of      Melanoma No family hx of      Glaucoma No family hx of      Macular Degeneration No family hx of          Allergies:  Allergies   Allergen Reactions     Penicillins Rash     Unasyn Rash     No evidence SJS, but very uncomfortable and precipitated multiple provider visits. Would not use penicillins again if other options available.        Medications:  Current Outpatient Medications   Medication Sig     acetaminophen (TYLENOL) 325 MG tablet Take 2 tablets (650 mg) by mouth every 4 hours as needed for mild pain     albuterol (PROAIR HFA, PROVENTIL HFA, VENTOLIN HFA) 108  (90 BASE) MCG/ACT inhaler Inhale 2 puffs into the lungs every 6 hours as needed for shortness of breath / dyspnea or wheezing     amLODIPine (NORVASC) 5 MG tablet Take 1 tablet (5 mg) by mouth daily     atorvastatin (LIPITOR) 20 MG tablet TAKE 1 TABLET BY MOUTH ONCE DAILY     blood glucose (ONETOUCH ULTRA) test strip TEST YOUR BLOOD SUGAR 3-4 TIMES PER DAY.     calcitRIOL (ROCALTROL) 0.25 MCG capsule TAKE 1 CAPSULE (0.25 MCG) BY MOUTH DAILY     carvedilol (COREG) 12.5 MG tablet TAKE 1 TABLET (12.5 MG) BY MOUTH 2 TIMES DAILY (WITH MEALS)     clindamycin (CLINDAMAX) 1 % topical gel Apply topically 2 times daily     furosemide (LASIX) 40 MG tablet Take 1 tablet (40 mg) by mouth 2 times daily (Patient taking differently: Take 40 mg by mouth every evening )     furosemide (LASIX) 80 MG tablet Take 60 mg by mouth every morning     insulin aspart (NOVOLOG FLEXPEN) 100 UNIT/ML pen Inject 4 units SQ with breakfast, lunch and dinner.     insulin glargine (BASAGLAR KWIKPEN) 100 UNIT/ML pen Inject 22 Units Subcutaneous At Bedtime Inject 22 U SubCut at HS     insulin pen needle (ULTICARE MINI) 31G X 6 MM Use daily or as directed.     order for DME Compression socks - knee  15-20 mmHg  Open toed.  Use daily.     order for DME Equipment being ordered: Mattress Overlay for Hospital Bed. Height 65. Weight 168 lbs.  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     order for DME 1 wheelchair     order for DME Equipment being ordered: TEDS stocking   Below the knee 15-20 mg  Dispense 2  Use daily     ORDER FOR DME Equipment being ordered: Compression stockings, 20-30 MMHG, knee high     sertraline (ZOLOFT) 50 MG tablet Take 1  tablet (50 mg) by mouth daily     triamcinolone (KENALOG) 0.1 % external cream Apply to sites of dermatitis- the abdomen, armpits, groin as needed.     vitamin D3 (CHOLECALCIFEROL) 2000 units tablet Take 1 tablet by mouth daily     No current facility-administered medications for this visit.       Vitals:  /73   Pulse 66   SpO2 97%     Exam:  GEN: Pleasant female in NAD. Daughter present  CARDIAC: RRR  LUNGS: CTA  ABDOMEN: Soft NT  EXT: Left LORI, Right trace edema  NEURO: alert. Oriented. No asterixis    LABS:   CMP  Recent Labs   Lab Test 04/24/19  1514 02/15/19  1459 01/18/19  1457 12/07/18  1411    144 142 143   POTASSIUM 4.1 4.3 4.5 4.3   CHLORIDE 110* 110* 108 111*   CO2 23 28 28 25   ANIONGAP 9 6 6 7   * 126* 166* 117*   BUN 98* 75* 70* 74*   CR 4.52* 3.53* 3.68* 3.99*   GFRESTIMATED 9* 12* 12* 11*   GFRESTBLACK 11* 14* 14* 13*   JODY 8.7 8.6 8.5 8.3*     Recent Labs   Lab Test 07/31/18  1148 07/24/18  0900 07/17/18  0830 06/24/18  0623 06/23/18  2347 03/29/18  1410 11/13/17  0715   BILITOTAL  --   --   --  0.2 0.2 0.2 0.3   ALKPHOS  --   --   --  49 56 56 70   ALT  --   --   --  17 13 15 37   AST 24 24 12 16 12 14 30     CBC  Recent Labs   Lab Test 04/24/19  1514 02/15/19  1459 01/18/19  1457 12/07/18  1411  07/31/18  1148   HGB 8.2* 10.3* 10.2* 10.3*   < > 9.3*   WBC  --  5.5 5.4 5.7  --  5.1   RBC  --  3.40* 3.47* 3.47*  --  3.09*   HCT  --  31.6* 32.4* 32.3*  --  29.1*   MCV  --  93 93 93  --  94   MCH  --  30.3 29.4 29.7  --  30.1   MCHC  --  32.6 31.5 31.9  --  32.0   RDW  --  12.5 12.4 12.4  --  12.8   PLT  --  232 222 237  --  246    < > = values in this interval not displayed.     URINE STUDIES  Recent Labs   Lab Test 03/29/18  1448 01/25/18  0233 11/02/17  0602 10/26/16  1220  12/05/12  1541   COLOR Straw Light Yellow Light Yellow Yellow   < > Yellow   APPEARANCE Clear Clear Clear Slightly Cloudy   < > Clear   URINEGLC 50* Negative 300* >500*   < > 100*   URINEBILI Negative  Negative Negative Negative   < > Negative   URINEKETONE Negative Negative Negative Negative   < > Negative   SG 1.008 1.007 1.010 1.014   < > 1.025   UBLD Negative Negative Negative Negative   < > Small*   URINEPH 5.0 6.5 7.5* 6.0   < > 6.0   PROTEIN >499* 100* >600* >500*   < > 100*   UROBILINOGEN  --   --   --   --   --  0.2   NITRITE Negative Negative Negative Negative   < > Negative   LEUKEST Negative Negative Small* Small*   < > Negative   RBCU 1 0 1 5*   < >  --    WBCU 2 1 44* 54*   < >  --     < > = values in this interval not displayed.     Recent Labs   Lab Test 12/07/18  1417 11/01/18  1533 05/24/18  1441 04/11/18  1025   UTPG 5.60* 6.71* 5.97* 4.98*     PTH  Recent Labs   Lab Test 01/18/19  1457 10/17/18  1413 05/24/18  1435   PTHI 126* 127* 55     IRON STUDIES  Recent Labs   Lab Test 04/24/19  1514 02/15/19  1459 12/07/18  1411   IRON 34* 61 42   * 249 240   IRONSAT 15 24 18   ELENA 298* 316* 348*

## 2019-04-24 NOTE — LETTER
4/24/2019      RE: Supriya Herr  [address]  [facility] [address]       Nephrology Clinic Visit 2/15/19    Assessment and Plan:     1. CKD5 w/proteinuria-  eGFR 12 but today down to 9. She is not symptomatically significantly changed, but azotemia worse  - will see if surgery needs any repeat US or just a matter of scheduling surgery   - RTC in a few weeks and likely need to proceed with surgery unless renal function shows improvement  - has fatigue   - Inactive on transplant wait list   - Will need AVG when time comes to initiate HD    2. Volume status - Euvolemic. No significant edema. Blood pressures higher than goal.    - continue same furosemide dose, can consider increasing if BP remains higher     3. HTN - Improved control. Clinic blood pressures 132-154/75-77. Outside blood pressures in the 130-140/ range. HR 63  Current regimen:    - lasix 60/40   - Coreg 12.5 mg bid   - Amlodipine 5 mg every day - can increase to 10mg (? More swelling)      - Will continue current regimen      - Did not increase Coreg due to HR in the 60's    - Continue home monitoring   - Blood pressure goal < 130/80     4. Electrolytes - No acute concerns. K 4.3, Na 144     5. Acid base - No acute concerns. Bicarb stable     6. BMD - Ca corrected 9.5, Phos 4.2, Albumin 2.8   - Vit D 27 (10/18)   -  (1/19)   - On Calcitriol and Cholecalciferol. Daughter misunderstood and patient was only getting Calcitriol. Will resume Meseret as well.      7. Anemia - Hgb down signifcantly  - iron sat low, thus will set up IV injectafer  - No need for KELLIE at this time   - Colonoscopy 2018, tubular adenomas     8. DM2 - Well controlled. A1c 5.4%. On Insulin      9. Depression - on zoloft     10. Disposition - RTC in  2-4 weeks with me or Ethel.   - spoke with daughter on the phone.      Assessment and plan was discussed with patient and she voiced her understanding and agreement.     Reason for Visit:  CKD5/HTN f/u     HPI:  Ms Herr is  a 68 yo female with CKD5 and nephrotic range proteinuria, DM2, HTN, in today for routine CKD f/u. Last seen by me on 19. Amlodipine was started given elevated blood pressures and Zoloft increased due to emotional lability. Baseline creat in the 3-4 range since   She had consult for AVG 2018   She is eating 'too well'  Her Scr today is up a little and GFR is 9, BUN in 90s. She does not seem hypovolemic, in fact BP is higher than goal.  Her blood sugars are great. Her potassium is great.  No hospital admissions or illness since last visit      ROS: 4 point ROS neg other than the symptoms noted above in the HPI.         Chronic Health Problems:     CKD5  Proteinuria  AKA, left (10/18)  T2DM  Vit D def  HTN  HLD  Anxiety/depression  Vitiligo  Non proliferative diabetic retinopathy  BCC  JONE  Anemia  Prior tobacco abuse     Personal Hx:   Lives in lower level of duplex, daughter upper level. NS    Family Hx:   Family History   Problem Relation Age of Onset     Diabetes Mother      Hypertension Mother      Heart Disease Mother          of congestive heart failure     Eye Disorder Mother      Arthritis Mother      Obesity Mother      Heart Disease Father          from CHF     Cerebrovascular Disease Father      Arthritis Father      Musculoskeletal Disorder Other         has MS     Thyroid Disease Other      Eye Disorder Other         cataracts     Cancer Other         throat/liver     Skin Cancer No family hx of      Melanoma No family hx of      Glaucoma No family hx of      Macular Degeneration No family hx of          Allergies:  Allergies   Allergen Reactions     Penicillins Rash     Unasyn Rash     No evidence SJS, but very uncomfortable and precipitated multiple provider visits. Would not use penicillins again if other options available.        Medications:  Current Outpatient Medications   Medication Sig     acetaminophen (TYLENOL) 325 MG tablet Take 2 tablets (650 mg) by mouth every 4 hours as  needed for mild pain     albuterol (PROAIR HFA, PROVENTIL HFA, VENTOLIN HFA) 108 (90 BASE) MCG/ACT inhaler Inhale 2 puffs into the lungs every 6 hours as needed for shortness of breath / dyspnea or wheezing     amLODIPine (NORVASC) 5 MG tablet Take 1 tablet (5 mg) by mouth daily     atorvastatin (LIPITOR) 20 MG tablet TAKE 1 TABLET BY MOUTH ONCE DAILY     blood glucose (ONETOUCH ULTRA) test strip TEST YOUR BLOOD SUGAR 3-4 TIMES PER DAY.     calcitRIOL (ROCALTROL) 0.25 MCG capsule TAKE 1 CAPSULE (0.25 MCG) BY MOUTH DAILY     carvedilol (COREG) 12.5 MG tablet TAKE 1 TABLET (12.5 MG) BY MOUTH 2 TIMES DAILY (WITH MEALS)     clindamycin (CLINDAMAX) 1 % topical gel Apply topically 2 times daily     furosemide (LASIX) 40 MG tablet Take 1 tablet (40 mg) by mouth 2 times daily (Patient taking differently: Take 40 mg by mouth every evening )     furosemide (LASIX) 80 MG tablet Take 60 mg by mouth every morning     insulin aspart (NOVOLOG FLEXPEN) 100 UNIT/ML pen Inject 4 units SQ with breakfast, lunch and dinner.     insulin glargine (BASAGLAR KWIKPEN) 100 UNIT/ML pen Inject 22 Units Subcutaneous At Bedtime Inject 22 U SubCut at HS     insulin pen needle (ULTICARE MINI) 31G X 6 MM Use daily or as directed.     order for DME Compression socks - knee  15-20 mmHg  Open toed.  Use daily.     order for DME Equipment being ordered: Mattress Overlay for Hospital Bed. Height 65. Weight 168 lbs.  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     order for DME 1 wheelchair     order for DME Equipment being ordered: TEDS stocking   Below the knee 15-20 mg  Dispense 2  Use daily     ORDER FOR DME Equipment being ordered:  Compression stockings, 20-30 MMHG, knee high     sertraline (ZOLOFT) 50 MG tablet Take 1 tablet (50 mg) by mouth daily     triamcinolone (KENALOG) 0.1 % external cream Apply to sites of dermatitis- the abdomen, armpits, groin as needed.     vitamin D3 (CHOLECALCIFEROL) 2000 units tablet Take 1 tablet by mouth daily     No current facility-administered medications for this visit.       Vitals:  /73   Pulse 66   SpO2 97%     Exam:  GEN: Pleasant female in NAD. Daughter present  CARDIAC: RRR  LUNGS: CTA  ABDOMEN: Soft NT  EXT: Left LORI, Right trace edema  NEURO: alert. Oriented. No asterixis    LABS:   CMP  Recent Labs   Lab Test 04/24/19  1514 02/15/19  1459 01/18/19  1457 12/07/18  1411    144 142 143   POTASSIUM 4.1 4.3 4.5 4.3   CHLORIDE 110* 110* 108 111*   CO2 23 28 28 25   ANIONGAP 9 6 6 7   * 126* 166* 117*   BUN 98* 75* 70* 74*   CR 4.52* 3.53* 3.68* 3.99*   GFRESTIMATED 9* 12* 12* 11*   GFRESTBLACK 11* 14* 14* 13*   JODY 8.7 8.6 8.5 8.3*     Recent Labs   Lab Test 07/31/18  1148 07/24/18  0900 07/17/18  0830 06/24/18  0623 06/23/18  2347 03/29/18  1410 11/13/17  0715   BILITOTAL  --   --   --  0.2 0.2 0.2 0.3   ALKPHOS  --   --   --  49 56 56 70   ALT  --   --   --  17 13 15 37   AST 24 24 12 16 12 14 30     CBC  Recent Labs   Lab Test 04/24/19  1514 02/15/19  1459 01/18/19  1457 12/07/18  1411  07/31/18  1148   HGB 8.2* 10.3* 10.2* 10.3*   < > 9.3*   WBC  --  5.5 5.4 5.7  --  5.1   RBC  --  3.40* 3.47* 3.47*  --  3.09*   HCT  --  31.6* 32.4* 32.3*  --  29.1*   MCV  --  93 93 93  --  94   MCH  --  30.3 29.4 29.7  --  30.1   MCHC  --  32.6 31.5 31.9  --  32.0   RDW  --  12.5 12.4 12.4  --  12.8   PLT  --  232 222 237  --  246    < > = values in this interval not displayed.     URINE STUDIES  Recent Labs   Lab Test 03/29/18  1448 01/25/18  0233 11/02/17  0602 10/26/16  1220  12/05/12  1541   COLOR Straw Light Yellow Light Yellow Yellow   < > Yellow   APPEARANCE Clear Clear Clear Slightly  Cloudy   < > Clear   URINEGLC 50* Negative 300* >500*   < > 100*   URINEBILI Negative Negative Negative Negative   < > Negative   URINEKETONE Negative Negative Negative Negative   < > Negative   SG 1.008 1.007 1.010 1.014   < > 1.025   UBLD Negative Negative Negative Negative   < > Small*   URINEPH 5.0 6.5 7.5* 6.0   < > 6.0   PROTEIN >499* 100* >600* >500*   < > 100*   UROBILINOGEN  --   --   --   --   --  0.2   NITRITE Negative Negative Negative Negative   < > Negative   LEUKEST Negative Negative Small* Small*   < > Negative   RBCU 1 0 1 5*   < >  --    WBCU 2 1 44* 54*   < >  --     < > = values in this interval not displayed.     Recent Labs   Lab Test 12/07/18  1417 11/01/18  1533 05/24/18  1441 04/11/18  1025   UTPG 5.60* 6.71* 5.97* 4.98*     PTH  Recent Labs   Lab Test 01/18/19  1457 10/17/18  1413 05/24/18  1435   PTHI 126* 127* 55     IRON STUDIES  Recent Labs   Lab Test 04/24/19  1514 02/15/19  1459 12/07/18  1411   IRON 34* 61 42   * 249 240   IRONSAT 15 24 18   ELENA 298* 316* 348*       Kathryn Abundio Basilio MD

## 2019-04-29 ENCOUNTER — CARE COORDINATION (OUTPATIENT)
Dept: NEPHROLOGY | Facility: CLINIC | Age: 69
End: 2019-04-29

## 2019-04-29 ENCOUNTER — INFUSION THERAPY VISIT (OUTPATIENT)
Dept: INFUSION THERAPY | Facility: CLINIC | Age: 69
End: 2019-04-29
Attending: INTERNAL MEDICINE
Payer: MEDICARE

## 2019-04-29 VITALS
OXYGEN SATURATION: 98 % | RESPIRATION RATE: 18 BRPM | DIASTOLIC BLOOD PRESSURE: 74 MMHG | SYSTOLIC BLOOD PRESSURE: 187 MMHG | TEMPERATURE: 98.3 F

## 2019-04-29 DIAGNOSIS — D50.9 IRON DEFICIENCY ANEMIA, UNSPECIFIED IRON DEFICIENCY ANEMIA TYPE: Primary | ICD-10-CM

## 2019-04-29 PROCEDURE — 25800030 ZZH RX IP 258 OP 636: Mod: ZF | Performed by: INTERNAL MEDICINE

## 2019-04-29 PROCEDURE — 25000128 H RX IP 250 OP 636: Mod: ZF | Performed by: INTERNAL MEDICINE

## 2019-04-29 PROCEDURE — 96365 THER/PROPH/DIAG IV INF INIT: CPT

## 2019-04-29 RX ADMIN — FERRIC CARBOXYMALTOSE INJECTION 750 MG: 50 INJECTION, SOLUTION INTRAVENOUS at 15:17

## 2019-04-29 NOTE — PROGRESS NOTES
Nursing Note  Supriya Herr presents today to [facility] for: Injectafer  During today's [facility] appointment, orders from Dr. Basilio were completed.  Frequency: today is dose 1 of 2 total.    Progress note:  Patient identification verified by name and date of birth.  Assessment completed.  Vitals recorded in Doc Flowsheets.  Patient was provided with education regarding infusion and possible side effects.  Patient verbalized understanding.      needed: No  Premedications: were not ordered.  Infusion Rates: given over 15 minutes + 30 minute observation period.  Approximate Infusion length:15 minutes. + 30 minute observation  Labs: were not ordered for this appointment.  Vascular access: peripheral IV was placed.  Patient tolerated infusion: well.    Drug Waste Record? No     Discharge Plan:   Follow up plan of care with: ongoing infusions at [facility].  Discharge instructions were reviewed with patient.  Patient/representative verbalized understanding of discharge instructions and all questions answered.  Patient discharged from [facility] in stable condition.    Kat Franco RN       Administrations This Visit     ferric carboxymaltose (INJECTAFER) 750 mg in sodium chloride 0.9 % 100 mL intermittent infusion     Admin Date  04/29/2019 Action  New Bag Dose  750 mg Rate  500 mL/hr Route  Intravenous Administered By  Kat Franco, RN                BP (!) 170/102 (BP Location: Left arm)   Temp 98.3  F (36.8  C) (Oral)   Resp 18   SpO2 98%

## 2019-04-29 NOTE — TELEPHONE ENCOUNTER
I would need to see her and do a urinalysis. Request declined. RX was sent for patient on 01/18/19 for year supply. Pt should have refills on file.    Ana Luisa Garcia RN  [facility]

## 2019-04-29 NOTE — PROGRESS NOTES
Nephrology RN Care Coordinator called and spoke to daughter, Caroline Gray regarding scheduling a follow up office visit with Nephrology per instructions of Dr. Basilio's last visit with patient April 24th.     Scheduled a follow up with Ethel Guerrero for May 24th at 11am with labwork at 10am    Daughter asking about when patient would be having a consult with Vascular surgery. Per daughter, Dr. Basilio was going to set this up at the last visit last week, but they haven't heard anything about a plan.    Writer will follow up with Dr. Basilio regarding this as well as route to Ethel for fyi. Will inform patient and her daughter if I hear any plan regarding vascular access.     Caroline Gray verbalizes understanding of above appointment and knows that her or her mother can call us at [phone] if they have any additional questions or concerns.    Brooke Tobar, RN, BSN  Nephrology Care Coordinator  [facility]  [email or web address]  [phone]

## 2019-05-02 DIAGNOSIS — Z45.2 ENCOUNTER FOR ADJUSTMENT AND MANAGEMENT OF VASCULAR ACCESS DEVICE: ICD-10-CM

## 2019-05-02 DIAGNOSIS — N18.5 CHRONIC KIDNEY DISEASE, STAGE 5, KIDNEY FAILURE (H): Primary | ICD-10-CM

## 2019-05-02 DIAGNOSIS — Z01.818 ENCOUNTER FOR OTHER PREPROCEDURAL EXAMINATION: ICD-10-CM

## 2019-05-06 ENCOUNTER — INFUSION THERAPY VISIT (OUTPATIENT)
Dept: INFUSION THERAPY | Facility: CLINIC | Age: 69
End: 2019-05-06
Attending: INTERNAL MEDICINE
Payer: MEDICARE

## 2019-05-06 VITALS
RESPIRATION RATE: 18 BRPM | DIASTOLIC BLOOD PRESSURE: 71 MMHG | TEMPERATURE: 98.5 F | OXYGEN SATURATION: 100 % | SYSTOLIC BLOOD PRESSURE: 164 MMHG

## 2019-05-06 DIAGNOSIS — D50.9 IRON DEFICIENCY ANEMIA, UNSPECIFIED IRON DEFICIENCY ANEMIA TYPE: Primary | ICD-10-CM

## 2019-05-06 PROCEDURE — 96365 THER/PROPH/DIAG IV INF INIT: CPT

## 2019-05-06 PROCEDURE — 25000128 H RX IP 250 OP 636: Mod: ZF | Performed by: INTERNAL MEDICINE

## 2019-05-06 PROCEDURE — 25800030 ZZH RX IP 258 OP 636: Mod: ZF | Performed by: INTERNAL MEDICINE

## 2019-05-06 RX ADMIN — FERRIC CARBOXYMALTOSE INJECTION 750 MG: 50 INJECTION, SOLUTION INTRAVENOUS at 16:11

## 2019-05-06 NOTE — PROGRESS NOTES
Message sent again to the Clinic Coordinators to schedule and confirm with patient and daughter    --- Message -----  From: Damaris Molina, PENELOPE CNS  Sent: 5/2/2019   3:12 PM  To: Brooke Tobar, RN, *  Subject: Repeat vein mapping and fistula F/U with Jesus Morfin,  Please schedule appts on 5/24/2018 Friday  1. Repeat vein mapping - prior to lab and clinic visits  2. Fistula F/U with Sum at 2:30 pm  And call patient's daughter for appts  Thanks  Jesus Tobar RN on 5/6/2019 at 10:54 AM

## 2019-05-06 NOTE — PROGRESS NOTES
Nursing Note  Supriya Herr presents today to [facility] for: Injectafer  During [facility]'s [facility] appointment, orders from Dr. Basilio were completed.  Frequency: today is dose 2 of 2 total.    Progress note:  Patient identification verified by name and date of birth.  Assessment completed.  Vitals recorded in Doc Flowsheets.  Patient was provided with education regarding infusion and possible side effects.  Patient verbalized understanding.     present during visit today: Not Applicable.    Premedications: were not ordered.    Infusion length and rate:  infusion given over approximately 15 minutes; followed by 30 minute observation.    Labs: were not ordered for this appointment.    Vascular access: peripheral IV placed today.    Treatment Conditions: non-applicable.    Post Infusion Assessment:  Patient tolerated infusion without incident.       Discharge Plan:   Follow up plan of care with: ongoing infusions at [facility].  Discharge instructions were reviewed with patient.  Patient/representative verbalized understanding of discharge instructions and all questions answered.  Patient discharged from [facility] in stable condition.    Kamryn Stephenson RN  Administrations This Visit     ferric carboxymaltose (INJECTAFER) 750 mg in sodium chloride 0.9 % 100 mL intermittent infusion     Admin Date  05/06/2019 Action  New Bag Dose  750 mg Rate  500 mL/hr Route  Intravenous Administered By  Kamryn Stephenson RN

## 2019-05-24 ENCOUNTER — ANCILLARY PROCEDURE (OUTPATIENT)
Dept: ULTRASOUND IMAGING | Facility: CLINIC | Age: 69
End: 2019-05-24
Attending: CLINICAL NURSE SPECIALIST
Payer: MEDICARE

## 2019-05-24 ENCOUNTER — OFFICE VISIT (OUTPATIENT)
Dept: TRANSPLANT | Facility: CLINIC | Age: 69
End: 2019-05-24
Attending: CLINICAL NURSE SPECIALIST
Payer: MEDICARE

## 2019-05-24 ENCOUNTER — OFFICE VISIT (OUTPATIENT)
Dept: NEPHROLOGY | Facility: CLINIC | Age: 69
End: 2019-05-24
Payer: MEDICARE

## 2019-05-24 VITALS
WEIGHT: 192.6 LBS | BODY MASS INDEX: 30.95 KG/M2 | HEIGHT: 66 IN | SYSTOLIC BLOOD PRESSURE: 200 MMHG | HEART RATE: 54 BPM | DIASTOLIC BLOOD PRESSURE: 70 MMHG

## 2019-05-24 DIAGNOSIS — E55.9 VITAMIN D DEFICIENCY: ICD-10-CM

## 2019-05-24 DIAGNOSIS — Z01.818 ENCOUNTER FOR OTHER PREPROCEDURAL EXAMINATION: ICD-10-CM

## 2019-05-24 DIAGNOSIS — Z09 FOLLOW-UP EXAMINATION: ICD-10-CM

## 2019-05-24 DIAGNOSIS — N18.5 CKD (CHRONIC KIDNEY DISEASE) STAGE 5, GFR LESS THAN 15 ML/MIN (H): ICD-10-CM

## 2019-05-24 DIAGNOSIS — E83.52 HYPERCALCEMIA: ICD-10-CM

## 2019-05-24 DIAGNOSIS — N18.5 CKD (CHRONIC KIDNEY DISEASE) STAGE 5, GFR LESS THAN 15 ML/MIN (H): Primary | ICD-10-CM

## 2019-05-24 DIAGNOSIS — Z99.2 ENCOUNTER REGARDING VASCULAR ACCESS FOR DIALYSIS FOR END-STAGE RENAL DISEASE (H): ICD-10-CM

## 2019-05-24 DIAGNOSIS — D63.1 ANEMIA IN STAGE 5 CHRONIC KIDNEY DISEASE, NOT ON CHRONIC DIALYSIS (H): ICD-10-CM

## 2019-05-24 DIAGNOSIS — N18.5 ANEMIA IN STAGE 5 CHRONIC KIDNEY DISEASE, NOT ON CHRONIC DIALYSIS (H): ICD-10-CM

## 2019-05-24 DIAGNOSIS — N18.5 CHRONIC KIDNEY DISEASE, STAGE 5, KIDNEY FAILURE (H): ICD-10-CM

## 2019-05-24 DIAGNOSIS — N18.6 ENCOUNTER REGARDING VASCULAR ACCESS FOR DIALYSIS FOR END-STAGE RENAL DISEASE (H): ICD-10-CM

## 2019-05-24 DIAGNOSIS — I10 HYPERTENSION GOAL BP (BLOOD PRESSURE) < 140/90: ICD-10-CM

## 2019-05-24 DIAGNOSIS — Z45.2 ENCOUNTER FOR ADJUSTMENT AND MANAGEMENT OF VASCULAR ACCESS DEVICE: ICD-10-CM

## 2019-05-24 LAB
ALBUMIN SERPL-MCNC: 3 G/DL (ref 3.4–5)
ANION GAP SERPL CALCULATED.3IONS-SCNC: 8 MMOL/L (ref 3–14)
BUN SERPL-MCNC: 98 MG/DL (ref 7–30)
CALCIUM SERPL-MCNC: 9.8 MG/DL (ref 8.5–10.1)
CHLORIDE SERPL-SCNC: 113 MMOL/L (ref 94–109)
CO2 SERPL-SCNC: 23 MMOL/L (ref 20–32)
CREAT SERPL-MCNC: 4.15 MG/DL (ref 0.52–1.04)
CREAT UR-MCNC: 28 MG/DL
GFR SERPL CREATININE-BSD FRML MDRD: 10 ML/MIN/{1.73_M2}
GLUCOSE SERPL-MCNC: 66 MG/DL (ref 70–99)
PHOSPHATE SERPL-MCNC: 5.9 MG/DL (ref 2.5–4.5)
POTASSIUM SERPL-SCNC: 4.7 MMOL/L (ref 3.4–5.3)
PROT UR-MCNC: 1.74 G/L
PROT/CREAT 24H UR: 6.28 G/G CR (ref 0–0.2)
SODIUM SERPL-SCNC: 144 MMOL/L (ref 133–144)

## 2019-05-24 PROCEDURE — 36415 COLL VENOUS BLD VENIPUNCTURE: CPT

## 2019-05-24 PROCEDURE — 84156 ASSAY OF PROTEIN URINE: CPT

## 2019-05-24 PROCEDURE — G0463 HOSPITAL OUTPT CLINIC VISIT: HCPCS | Mod: ZF

## 2019-05-24 PROCEDURE — 80069 RENAL FUNCTION PANEL: CPT

## 2019-05-24 RX ORDER — AMLODIPINE BESYLATE 5 MG/1
5 TABLET ORAL 2 TIMES DAILY
Qty: 180 TABLET | Refills: 3 | Status: SHIPPED | OUTPATIENT
Start: 2019-05-24 | End: 2020-07-28

## 2019-05-24 ASSESSMENT — MIFFLIN-ST. JEOR: SCORE: 1410.06

## 2019-05-24 ASSESSMENT — PAIN SCALES - GENERAL: PAINLEVEL: EXTREME PAIN (8)

## 2019-05-24 NOTE — NURSING NOTE
"Chief Complaint   Patient presents with     Surgical Followup     fistula follow up     Blood pressure 200/70, pulse 54, height 1.676 m (5' 5.98\"), weight 87.4 kg (192 lb 9.6 oz), not currently breastfeeding.    Patient states medication are the same    Jim Contreras CMA on 5/24/2019 at 2:41 PM    "

## 2019-05-24 NOTE — LETTER
5/24/2019       RE: Supriya Herr  [address]  [facility] [address]     Dear Colleague,    Thank you for referring your patient, Supriya Herr, to the [facility] SOLID ORGAN TRANSPLANT at [facility]. Please see a copy of my visit note below.    Dialysis Access Service   Progress Note    S:  Ms. Herr is being seen today for followup of her dialysis access plan due to Chronic renal failure from Type 2 Diabetes.  She is right handed. Ms. Herr is not dialyzing at (Not currently on dialysis). We saw patient for surgical consult of dialysis vascular access on 2/19/2018. Left upper arm AV graft (Bovine) construction was recommended. She returns to clinic visit today to re-evaluate left upper arm AV graft (Bovine) construction D/T she is getting closer to initiate dialysis per Nephrology team recommendation. 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        O:  Pulse:  [54] 54  BP: (200-204)/(70-73) 200/70  GENERAL: alert, cooperative  Circulation:   Arterial Exam:   Radial                             L: 3+ R: 3+   Ulnar                               L: 2+;R: 2+  Patent Palmar arch         L: YES   [x]  NO   []       R: YES   [x]   NO   []        Capillary refill:                 L: <5 sec, R:<5sec     Sensory exam:               Left hand:          Normal   []       Abnormal   [x]     Comment:  Diabetic nephropathy               Right hand:        Normal   []       Abnormal   [x]     Comment:  Diabetic nephropathy     Motor exam normal:               Left hand:          Normal   [x]       Abnormal   []     Comment:                 Right hand:        Normal   [x]       Abnormal   []     Comment:               Repeat Mapping today and Reviewed:  Target vein:  left axillary vein  Target artery brachial artery at the distal upper arm  Arterial evaluation (peak systolic velocities):  Right:  Innominate: 119 cm/sec  Subclavian: 82 cm/sec  Brachial: 66 cm/sec, 5 mm  diameter  Radial: 74 cm/sec, 2 mm diameter  Ulnar: 95 cm/sec  Left:  Subclavian: 75 cm/sec  Brachial: 63 cm/sec, 4 mm diameter  Radial: 76 cm/sec, 2 mm diameter  Ulnar: 93 cm/sec  Venous evaluation  Right Upper Extremity:    IJV: Thrombus: no, Phasic: Yes,  84 cm/sec   Innominate vein: Thrombus: no, Phasic: Yes, 43 cm/sec   SCV medial: Thrombus: no, Phasic: Yes, 80 cm/sec   SCV mid: Thrombus: no, Phasic: Yes, 98 cm/sec   SCV lateral: Thrombus: no, Phasic: Yes, 82 cm/sec   Axillary vein:Thrombus: no, Phasic: Yes,  96 cm/sec  Right cephalic vein:   Proximal humerus: Thrombus: no, Wall thickened: no,  2 mm, 1.6 cm from the skin  Mid humerus: Thrombus: no, Wall thickened: no,  3 mm, 0.8 cm from the skin  Distal humerus: Thrombus: no, Wall thickened: no,  3 mm, 0.7 cm from the skin  Antecubital fossa: Thrombus: no, Wall thickened: no,  3 mm, 0.6 cm from the skin  Proximal forearm: Thrombus: no, Wall thickened: no,  2 mm, 0.5 cm from the skin  Mid forearm: Thrombus: no, Wall thickened: no,  2 mm, 0.5 cm from the skin  Wrist: Thrombus: no, Wall thickened: no,  1 mm, 0.1 cm from the skin  Right basilic vein:   Proximal arm: Thrombus: no, Wall thickened: no,  5 mm, 1.9 cm from the skin   Mid arm: Thrombus: no, Wall thickened: no,  2 mm, 2.1 cm from the skin   Distal arm: Thrombus: no, Wall thickened: no,  3 mm, 0.8 cm from the skin   Antecubital fossa: Not seen  Right brachial vein:   Proximal arm: Thrombus: no, Wall thickened: no,  2, 2 mm   Mid arm: Thrombus: no, Wall thickened: no,  1, 1 mm   Distal arm: Not seen   Antecubital fossa: Not seen  Left Upper Extremity:   IJV: Thrombus: no, Phasic: Yes, 79 cm/sec   Innominate vein: Thrombus: no, Phasic: Yes, 48 cm/sec   SCV medial: Thrombus: no, Phasic: Yes, 73 cm/sec   SCV mid: Thrombus: no, Phasic: Yes, 62 cm/sec   SCV lateral: Thrombus: no, Phasic: Yes, 24 cm/sec   Axillary vein: Thrombus: no, Phasic: Yes, 28 cm/sec  Left cephalic vein:    Proximal humerus: Thrombus: no,  Wall thickened: no,  3 mm, 2.1 cm from the skin   Mid humerus: Thrombus: no, Wall thickened: no,  2 mm, 1.1 cm from the skin   Distal humerus: Thrombus: no, Wall thickened: no,  2 mm, 0.6 cm from the skin   Antecubital fossa: Thrombus: no, Wall thickened: no,  3 mm, 0.8 cm from the skin.   Proximal forearm: Thrombus: no, Wall thickened: no,  0.4 mm, 0.6 cm from the skin   Mid forearm: Thrombus: no, Wall thickened: Yes,  1 mm, 0.6 cm from the skin   Wrist: Thrombus: no, Wall thickened: no,  1 mm, 0.4 cm from the skin  Left basilic vein:   Proximal arm: Thrombus: no, Wall thickened: no,  2 mm, 2.1 cm from the skin   Mid arm: Thrombus: no, Wall thickened: no,  2 mm, 2.1 cm from the skin   Distal arm: Thrombus: no, Wall thickened: no,  2 mm, 1.7 cm from the skin   Antecubital fossa: Not seen  Left brachial vein:   Proximal arm: Thrombus: no, Wall thickened: no,  1, 2 mm,    Mid arm: Thrombus: no, Wall thickened: no,  2, 1 mm   Distal arm: Thrombus: no, Wall thickened: no,  1, 2 mm   Antecubital fossa: Thrombus: no, Wall thickened  No, 2.2, mm                                                              Impression:   1. Cephalic, basilic and brachial veins measured as described above.  2. No upper extremity deep venous thrombus identified.  3. No superficial venous thrombus noted. Wall thickening of the left cephalic vein, mid forearm.  4. Patent upper extremity arteries as detailed.     Assessment & Plan: Ms. Herr is a good candidate for placement of permanent dialysis access.  I would recommend left brachial artery to axillary vein AV bovine graft creation under General/block. I would recommend PAC prior to surgery.     The surgical risks and benefits were reviewed and questions were answered. We discussed the day of surgery plan, anesthesia, postop care, risk of infection, numbness, injury to surrounding structures, bleeding, thrombosis, steal syndrome, possible need for future angioplasty or surgical revision, as  well as nonmaturation or need for site abandonment. This was contrasted with morbidity and mortality risk of long-term catheter based hemodialysis access. The patient does wish to proceed with surgery for permanent access creation at this time. We will follow up with nephrology team for timing to schedule surgery.  The patient was counselled to contact our nurse coordinator, PENELOPE Hollingsworth CNS (Sum) at [phone] with any questions or concerns.  Thank you for the opportunity to participate in Ms. Herr's care.      TT: 15 min  CT: 15 min    KASSI Yoder (Sum)  Dialysis Vascular Access/SOT Clinical Nurse Specialist    Solid Organ Transplant Service - [facility]   Phone # [phone]  Pager # [phone]

## 2019-05-24 NOTE — LETTER
5/24/2019       RE: Supriya Herr  [address]  [facility] [address]     Dear Colleague,    Thank you for referring your patient, Supriya Herr, to the [facility] SOLID ORGAN TRANSPLANT at [facility]. Please see a copy of my visit note below.    Dialysis Access Service   Progress Note    S:  Ms. Herr is being seen today for followup of her dialysis access plan due to Chronic renal failure from Type 2 Diabetes.  She is right handed. Ms. Herr is not dialyzing at (Not currently on dialysis). We saw patient for surgical consult of dialysis vascular access on 2/19/2018. Left upper arm AV graft (Bovine) construction was recommended. She returns to clinic visit today to re-evaluate left upper arm AV graft (Bovine) construction D/T she is getting closer to initiate dialysis per Nephrology team recommendation. 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        O:  Pulse:  [54] 54  BP: (200-204)/(70-73) 200/70  GENERAL: alert, cooperative  Circulation:   Arterial Exam:   Radial                             L: 3+ R: 3+   Ulnar                               L: 2+;R: 2+  Patent Palmar arch         L: YES   [x]  NO   []       R: YES   [x]   NO   []        Capillary refill:                 L: <5 sec, R:<5sec     Sensory exam:               Left hand:          Normal   []       Abnormal   [x]     Comment:  Diabetic nephropathy               Right hand:        Normal   []       Abnormal   [x]     Comment:  Diabetic nephropathy     Motor exam normal:               Left hand:          Normal   [x]       Abnormal   []     Comment:                 Right hand:        Normal   [x]       Abnormal   []     Comment:               Repeat Mapping today and Reviewed:  Target vein:  left axillary vein  Target artery brachial artery at the distal upper arm  Arterial evaluation (peak systolic velocities):  Right:  Innominate: 119 cm/sec  Subclavian: 82 cm/sec  Brachial: 66 cm/sec, 5 mm  diameter  Radial: 74 cm/sec, 2 mm diameter  Ulnar: 95 cm/sec  Left:  Subclavian: 75 cm/sec  Brachial: 63 cm/sec, 4 mm diameter  Radial: 76 cm/sec, 2 mm diameter  Ulnar: 93 cm/sec  Venous evaluation  Right Upper Extremity:    IJV: Thrombus: no, Phasic: Yes,  84 cm/sec   Innominate vein: Thrombus: no, Phasic: Yes, 43 cm/sec   SCV medial: Thrombus: no, Phasic: Yes, 80 cm/sec   SCV mid: Thrombus: no, Phasic: Yes, 98 cm/sec   SCV lateral: Thrombus: no, Phasic: Yes, 82 cm/sec   Axillary vein:Thrombus: no, Phasic: Yes,  96 cm/sec  Right cephalic vein:   Proximal humerus: Thrombus: no, Wall thickened: no,  2 mm, 1.6 cm from the skin  Mid humerus: Thrombus: no, Wall thickened: no,  3 mm, 0.8 cm from the skin  Distal humerus: Thrombus: no, Wall thickened: no,  3 mm, 0.7 cm from the skin  Antecubital fossa: Thrombus: no, Wall thickened: no,  3 mm, 0.6 cm from the skin  Proximal forearm: Thrombus: no, Wall thickened: no,  2 mm, 0.5 cm from the skin  Mid forearm: Thrombus: no, Wall thickened: no,  2 mm, 0.5 cm from the skin  Wrist: Thrombus: no, Wall thickened: no,  1 mm, 0.1 cm from the skin  Right basilic vein:   Proximal arm: Thrombus: no, Wall thickened: no,  5 mm, 1.9 cm from the skin   Mid arm: Thrombus: no, Wall thickened: no,  2 mm, 2.1 cm from the skin   Distal arm: Thrombus: no, Wall thickened: no,  3 mm, 0.8 cm from the skin   Antecubital fossa: Not seen  Right brachial vein:   Proximal arm: Thrombus: no, Wall thickened: no,  2, 2 mm   Mid arm: Thrombus: no, Wall thickened: no,  1, 1 mm   Distal arm: Not seen   Antecubital fossa: Not seen  Left Upper Extremity:   IJV: Thrombus: no, Phasic: Yes, 79 cm/sec   Innominate vein: Thrombus: no, Phasic: Yes, 48 cm/sec   SCV medial: Thrombus: no, Phasic: Yes, 73 cm/sec   SCV mid: Thrombus: no, Phasic: Yes, 62 cm/sec   SCV lateral: Thrombus: no, Phasic: Yes, 24 cm/sec   Axillary vein: Thrombus: no, Phasic: Yes, 28 cm/sec  Left cephalic vein:    Proximal humerus: Thrombus: no,  Wall thickened: no,  3 mm, 2.1 cm from the skin   Mid humerus: Thrombus: no, Wall thickened: no,  2 mm, 1.1 cm from the skin   Distal humerus: Thrombus: no, Wall thickened: no,  2 mm, 0.6 cm from the skin   Antecubital fossa: Thrombus: no, Wall thickened: no,  3 mm, 0.8 cm from the skin.   Proximal forearm: Thrombus: no, Wall thickened: no,  0.4 mm, 0.6 cm from the skin   Mid forearm: Thrombus: no, Wall thickened: Yes,  1 mm, 0.6 cm from the skin   Wrist: Thrombus: no, Wall thickened: no,  1 mm, 0.4 cm from the skin  Left basilic vein:   Proximal arm: Thrombus: no, Wall thickened: no,  2 mm, 2.1 cm from the skin   Mid arm: Thrombus: no, Wall thickened: no,  2 mm, 2.1 cm from the skin   Distal arm: Thrombus: no, Wall thickened: no,  2 mm, 1.7 cm from the skin   Antecubital fossa: Not seen  Left brachial vein:   Proximal arm: Thrombus: no, Wall thickened: no,  1, 2 mm,    Mid arm: Thrombus: no, Wall thickened: no,  2, 1 mm   Distal arm: Thrombus: no, Wall thickened: no,  1, 2 mm   Antecubital fossa: Thrombus: no, Wall thickened  No, 2.2, mm                                                              Impression:   1. Cephalic, basilic and brachial veins measured as described above.  2. No upper extremity deep venous thrombus identified.  3. No superficial venous thrombus noted. Wall thickening of the left cephalic vein, mid forearm.  4. Patent upper extremity arteries as detailed.     Assessment & Plan: Ms. Herr is a good candidate for placement of permanent dialysis access.  I would recommend left brachial artery to axillary vein AV bovine graft creation under General/block. I would recommend PAC prior to surgery.     The surgical risks and benefits were reviewed and questions were answered. We discussed the day of surgery plan, anesthesia, postop care, risk of infection, numbness, injury to surrounding structures, bleeding, thrombosis, steal syndrome, possible need for future angioplasty or surgical revision, as  well as nonmaturation or need for site abandonment. This was contrasted with morbidity and mortality risk of long-term catheter based hemodialysis access. The patient does wish to proceed with surgery for permanent access creation at this time. We will follow up with nephrology team for timing to schedule surgery.  The patient was counselled to contact our nurse coordinator, PENELOPE Hollingsworth CNS (Sum) at [phone] with any questions or concerns.  Thank you for the opportunity to participate in Ms. Herr's care.      TT: 15 min  CT: 15 min    KASSI Yoder (Sum)  Dialysis Vascular Access/SOT Clinical Nurse Specialist    Solid Organ Transplant Service - [facility]   Phone # [phone]  Pager # [phone]    Again, thank you for allowing me to participate in the care of your patient.      Sincerely,    PENELOPE Barrera

## 2019-05-24 NOTE — PROGRESS NOTES
Dialysis Access Service   Progress Note    S:  Ms. Herr is being seen today for followup of her dialysis access plan due to Chronic renal failure from Type 2 Diabetes.  She is right handed. Ms. Herr is not dialyzing at (Not currently on dialysis). We saw patient for surgical consult of dialysis vascular access on 2/19/2018. Left upper arm AV graft (Bovine) construction was recommended. She returns to clinic visit today to re-evaluate left upper arm AV graft (Bovine) construction D/T she is getting closer to initiate dialysis per Nephrology team recommendation. 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        O:  Pulse:  [54] 54  BP: (200-204)/(70-73) 200/70  GENERAL: alert, cooperative  Circulation:   Arterial Exam:   Radial                             L: 3+ R: 3+   Ulnar                               L: 2+;R: 2+  Patent Palmar arch         L: YES   [x]  NO   []       R: YES   [x]   NO   []        Capillary refill:                 L: <5 sec, R:<5sec     Sensory exam:               Left hand:          Normal   []       Abnormal   [x]     Comment:  Diabetic nephropathy               Right hand:        Normal   []       Abnormal   [x]     Comment:  Diabetic nephropathy     Motor exam normal:               Left hand:          Normal   [x]       Abnormal   []     Comment:                 Right hand:        Normal   [x]       Abnormal   []     Comment:              Repeat Mapping today and Reviewed:  Target vein:  left axillary vein  Target artery brachial artery at the distal upper arm  Arterial evaluation (peak systolic velocities):  Right:  Innominate: 119 cm/sec  Subclavian: 82 cm/sec  Brachial: 66 cm/sec, 5 mm diameter  Radial: 74 cm/sec, 2 mm diameter  Ulnar: 95 cm/sec  Left:  Subclavian: 75 cm/sec  Brachial: 63 cm/sec, 4 mm diameter  Radial: 76 cm/sec, 2 mm diameter  Ulnar: 93 cm/sec  Venous evaluation  Right Upper Extremity:    IJV: Thrombus: no, Phasic: Yes,  84 cm/sec   Innominate vein: Thrombus: no,  Phasic: Yes, 43 cm/sec   SCV medial: Thrombus: no, Phasic: Yes, 80 cm/sec   SCV mid: Thrombus: no, Phasic: Yes, 98 cm/sec   SCV lateral: Thrombus: no, Phasic: Yes, 82 cm/sec   Axillary vein:Thrombus: no, Phasic: Yes,  96 cm/sec  Right cephalic vein:   Proximal humerus: Thrombus: no, Wall thickened: no,  2 mm, 1.6 cm from the skin  Mid humerus: Thrombus: no, Wall thickened: no,  3 mm, 0.8 cm from the skin  Distal humerus: Thrombus: no, Wall thickened: no,  3 mm, 0.7 cm from the skin  Antecubital fossa: Thrombus: no, Wall thickened: no,  3 mm, 0.6 cm from the skin  Proximal forearm: Thrombus: no, Wall thickened: no,  2 mm, 0.5 cm from the skin  Mid forearm: Thrombus: no, Wall thickened: no,  2 mm, 0.5 cm from the skin  Wrist: Thrombus: no, Wall thickened: no,  1 mm, 0.1 cm from the skin  Right basilic vein:   Proximal arm: Thrombus: no, Wall thickened: no,  5 mm, 1.9 cm from the skin   Mid arm: Thrombus: no, Wall thickened: no,  2 mm, 2.1 cm from the skin   Distal arm: Thrombus: no, Wall thickened: no,  3 mm, 0.8 cm from the skin   Antecubital fossa: Not seen  Right brachial vein:   Proximal arm: Thrombus: no, Wall thickened: no,  2, 2 mm   Mid arm: Thrombus: no, Wall thickened: no,  1, 1 mm   Distal arm: Not seen   Antecubital fossa: Not seen  Left Upper Extremity:   IJV: Thrombus: no, Phasic: Yes, 79 cm/sec   Innominate vein: Thrombus: no, Phasic: Yes, 48 cm/sec   SCV medial: Thrombus: no, Phasic: Yes, 73 cm/sec   SCV mid: Thrombus: no, Phasic: Yes, 62 cm/sec   SCV lateral: Thrombus: no, Phasic: Yes, 24 cm/sec   Axillary vein: Thrombus: no, Phasic: Yes, 28 cm/sec  Left cephalic vein:    Proximal humerus: Thrombus: no, Wall thickened: no,  3 mm, 2.1 cm from the skin   Mid humerus: Thrombus: no, Wall thickened: no,  2 mm, 1.1 cm from the skin   Distal humerus: Thrombus: no, Wall thickened: no,  2 mm, 0.6 cm from the skin   Antecubital fossa: Thrombus: no, Wall thickened: no,  3 mm, 0.8 cm from the skin.   Proximal  forearm: Thrombus: no, Wall thickened: no,  0.4 mm, 0.6 cm from the skin   Mid forearm: Thrombus: no, Wall thickened: Yes,  1 mm, 0.6 cm from the skin   Wrist: Thrombus: no, Wall thickened: no,  1 mm, 0.4 cm from the skin  Left basilic vein:   Proximal arm: Thrombus: no, Wall thickened: no,  2 mm, 2.1 cm from the skin   Mid arm: Thrombus: no, Wall thickened: no,  2 mm, 2.1 cm from the skin   Distal arm: Thrombus: no, Wall thickened: no,  2 mm, 1.7 cm from the skin   Antecubital fossa: Not seen  Left brachial vein:   Proximal arm: Thrombus: no, Wall thickened: no,  1, 2 mm,    Mid arm: Thrombus: no, Wall thickened: no,  2, 1 mm   Distal arm: Thrombus: no, Wall thickened: no,  1, 2 mm   Antecubital fossa: Thrombus: no, Wall thickened  No, 2.2, mm                                                              Impression:   1. Cephalic, basilic and brachial veins measured as described above.  2. No upper extremity deep venous thrombus identified.  3. No superficial venous thrombus noted. Wall thickening of the left cephalic vein, mid forearm.  4. Patent upper extremity arteries as detailed.     Assessment & Plan: Ms. Herr is a good candidate for placement of permanent dialysis access.  I would recommend left brachial artery to axillary vein AV bovine graft creation under General/block. I would recommend PAC prior to surgery.     The surgical risks and benefits were reviewed and questions were answered. We discussed the day of surgery plan, anesthesia, postop care, risk of infection, numbness, injury to surrounding structures, bleeding, thrombosis, steal syndrome, possible need for future angioplasty or surgical revision, as well as nonmaturation or need for site abandonment. This was contrasted with morbidity and mortality risk of long-term catheter based hemodialysis access. The patient does wish to proceed with surgery for permanent access creation at this time. We will follow up with nephrology team for timing to  schedule surgery.  The patient was counselled to contact our nurse coordinator, PENELOPE Hollingsworth CNS (Sum) at [phone] with any questions or concerns.  Thank you for the opportunity to participate in Ms. Herr's care.      TT: 15 min  CT: 15 min    KASSI Yoder (Sum)  Dialysis Vascular Access/SOT Clinical Nurse Specialist    Solid Organ Transplant Service - [facility]   Phone # [phone]  Pager # [phone]

## 2019-05-25 NOTE — PROGRESS NOTES
Nephrology Clinic Visit 5/24/19    Assessment and Plan:     1. CKD5 w/proteinuria- Renal function is slowly declining. Creat 4.1, eGFR 10 ml/mn, UPCR 6.28 g/gCr. No uremic symptoms   - Etiology of CKD felt to be DM, HTN, Vascular dz,    - Inactive on transplant wait list   - Will need AVG when time comes to initiate HD. New vein mapping today confirms this plan. Dr Martin would not have time available until sometime in July.    - Had long talk with patient and daughter Caroline. Caroline was very tearful and frightened. She felt like she had done something wrong to worsen her mother's kidney function. She is fearful that starting dialysis will antonio the beginning of the end for her mother. We discussed the wisdom of careful planning for Supriya's transition to dialysis to hopefully prevent and emergent situation. After tears were shed by all, patient and daughter agreed to initiation likely by the end of summer.    - No need to initiate at this time, no uremic symptoms, lytes, bicarb fine   - Does not use NSAIDs   - Blood pressures uncontrolled.   - DM with excellent control. A1c 6.0 % 4/19   - On statin for CV risk reduction     2. Volume status - Euvolemic. No edema. Blood pressures uncontrolled. No current weight given amputation.   Remains on Lasix 60 mg am, 40 mg pm     3. HTN - Uncontrolled. Clinic blood pressures 180-190/ in setting of pain/. Outside blood pressures in the 150-170/ range.   Current regimen:    - lasix 60/40   - Coreg 12.5 mg bid   - Amlodipine 5 mg every day       - Will increase Amlodipine to 5 mg bid      - Did not increase Coreg due to HR in the 60's    - Continue home monitoring   - Blood pressure goal < 130/80     4. Electrolytes - No acute concerns. K 4.7, Na 144     5. Acid base - No acute concerns. Bicarb 23     6. BMD - Ca corrected 10.6, Phos 5.9, Albumin 3.0   - Vit D 27 (10/18)   -  (1/19)   - Hold Vit D and Calcitriol      7. Anemia - Hgb 8.2 (4/19)   - Iron studies 4/19:  Ferritin 298, Fe 34, IS 15.    - Received Injectafer  and    - Has not required KELLIE given baseline creat ~ 10   - Colonoscopy 2018, tubular adenomas     8. DM2 - Well controlled. A1c 6.0 %. On Insulin      9. Depression - Controlled with increase in Zoloft to 50 mg every day.       10. Disposition - RTC 1 month for f/u     Assessment and plan was discussed with patient and she voiced her understanding and agreement.     Reason for Visit:  CKD5/HTN f/u     HPI:  Ms Herr is a 70 yo female with CKD5 and nephrotic range proteinuria, DM2, HTN, in today for routine CKD f/u. Last seen by Dr Basilio 19. Baseline creat in the 3-4 range since      Interval Hx:   No hospital admissions     ROS:   Blood pressures at day program have risen over the last several weeks into the 150-170/ range. No clear etiology. Patient taking her medications as prescribed, following all of her dietary restrictions.   Patient currently in quite a lot of pain after having to lay flat for her vein mapping.   No other specific concerns voiced by patient.   Has good appetite. Blood sugars generally in target. Denies dyspnea, chest pain, abdominal pain. No bowel or bladder concerns. Doesn't yet have prosthesis.      Chronic Health Problems:     CKD5  Proteinuria  AKA, left (10/18)  T2DM  Vit D def  HTN  HLD  Anxiety/depression  Vitiligo  Non proliferative diabetic retinopathy  BCC  JONE  Anemia  Prior tobacco abuse     Personal Hx:   Lives in lower level of [facility], daughter Caroline upper level. NS    Family Hx:   Family History   Problem Relation Age of Onset     Diabetes Mother      Hypertension Mother      Heart Disease Mother          of congestive heart failure     Eye Disorder Mother      Arthritis Mother      Obesity Mother      Heart Disease Father          from CHF     Cerebrovascular Disease Father      Arthritis Father      Musculoskeletal Disorder Other         has MS     Thyroid Disease Other      Eye Disorder Other          cataracts     Cancer Other         throat/liver     Skin Cancer No family hx of      Melanoma No family hx of      Glaucoma No family hx of      Macular Degeneration No family hx of          Allergies:  Allergies   Allergen Reactions     Penicillins Rash     Unasyn Rash     No evidence SJS, but very uncomfortable and precipitated multiple provider visits. Would not use penicillins again if other options available.        Medications:  Current Outpatient Medications   Medication Sig     amLODIPine (NORVASC) 5 MG tablet Take 1 tablet (5 mg) by mouth 2 times daily     acetaminophen (TYLENOL) 325 MG tablet Take 2 tablets (650 mg) by mouth every 4 hours as needed for mild pain     albuterol (PROAIR HFA, PROVENTIL HFA, VENTOLIN HFA) 108 (90 BASE) MCG/ACT inhaler Inhale 2 puffs into the lungs every 6 hours as needed for shortness of breath / dyspnea or wheezing     atorvastatin (LIPITOR) 20 MG tablet TAKE 1 TABLET BY MOUTH ONCE DAILY     blood glucose (ONETOUCH ULTRA) test strip TEST YOUR BLOOD SUGAR 3-4 TIMES PER DAY.     calcitRIOL (ROCALTROL) 0.25 MCG capsule TAKE 1 CAPSULE (0.25 MCG) BY MOUTH DAILY     carvedilol (COREG) 12.5 MG tablet TAKE 1 TABLET (12.5 MG) BY MOUTH 2 TIMES DAILY (WITH MEALS)     clindamycin (CLINDAMAX) 1 % topical gel Apply topically 2 times daily     furosemide (LASIX) 40 MG tablet Take 1 tablet (40 mg) by mouth 2 times daily (Patient taking differently: Take 40 mg by mouth every evening )     furosemide (LASIX) 80 MG tablet Take 60 mg by mouth every morning     insulin aspart (NOVOLOG FLEXPEN) 100 UNIT/ML pen Inject 4 units SQ with breakfast, lunch and dinner.     insulin glargine (BASAGLAR KWIKPEN) 100 UNIT/ML pen Inject 22 Units Subcutaneous At Bedtime Inject 22 U SubCut at HS     insulin pen needle (ULTICARE MINI) 31G X 6 MM Use daily or as directed.     order for DME Compression socks - knee  15-20 mmHg  Open toed.  Use daily.     order for DME Equipment being ordered: Mattress Overlay  for Hospital Bed. Height 65. Weight 168 lbs.  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     order for DME 1 wheelchair     order for DME Equipment being ordered: TEDS stocking   Below the knee 15-20 mg  Dispense 2  Use daily     ORDER FOR DME Equipment being ordered: Compression stockings, 20-30 MMHG, knee high     sertraline (ZOLOFT) 50 MG tablet Take 1 tablet (50 mg) by mouth daily     triamcinolone (KENALOG) 0.1 % external cream Apply to sites of dermatitis- the abdomen, armpits, groin as needed.     vitamin D3 (CHOLECALCIFEROL) 2000 units tablet Take 1 tablet by mouth daily     No current facility-administered medications for this visit.       Vitals:  Vitals not recorded by CMA.   The 3 rooming blood pressures were in the 180-190/ range  Repeat manual blood pressure by me : 180/60    Exam:  GEN: Pleasant female in NAD. Daughter present.  CARDIAC: RRR  LUNGS: CTA  ABDOMEN: Soft NT  EXT: Left LORI, Right tr edema  NEURO: alert. Oriented. No asterixis    LABS:   CMP  Recent Labs   Lab Test 05/24/19  1410 04/24/19  1514 02/15/19  1459 01/18/19  1457    142 144 142   POTASSIUM 4.7 4.1 4.3 4.5   CHLORIDE 113* 110* 110* 108   CO2 23 23 28 28   ANIONGAP 8 9 6 6   GLC 66* 176* 126* 166*   BUN 98* 98* 75* 70*   CR 4.15* 4.52* 3.53* 3.68*   GFRESTIMATED 10* 9* 12* 12*   GFRESTBLACK 12* 11* 14* 14*   JODY 9.8 8.7 8.6 8.5     Recent Labs   Lab Test 07/31/18  1148 07/24/18  0900 07/17/18  0830 06/24/18  0623 06/23/18  2347 03/29/18  1410 11/13/17  0715   BILITOTAL  --   --   --  0.2 0.2 0.2 0.3   ALKPHOS  --   --   --  49 56 56 70   ALT  --   --   --  17 13 15 37   AST 24 24 12 16 12 14 30     CBC  Recent Labs    Lab Test 04/24/19  1514 02/15/19  1459 01/18/19  1457 12/07/18  1411  07/31/18  1148   HGB 8.2* 10.3* 10.2* 10.3*   < > 9.3*   WBC  --  5.5 5.4 5.7  --  5.1   RBC  --  3.40* 3.47* 3.47*  --  3.09*   HCT  --  31.6* 32.4* 32.3*  --  29.1*   MCV  --  93 93 93  --  94   MCH  --  30.3 29.4 29.7  --  30.1   MCHC  --  32.6 31.5 31.9  --  32.0   RDW  --  12.5 12.4 12.4  --  12.8   PLT  --  232 222 237  --  246    < > = values in this interval not displayed.     URINE STUDIES  Recent Labs   Lab Test 03/29/18  1448 01/25/18  0233 11/02/17  0602 10/26/16  1220  12/05/12  1541   COLOR Straw Light Yellow Light Yellow Yellow   < > Yellow   APPEARANCE Clear Clear Clear Slightly Cloudy   < > Clear   URINEGLC 50* Negative 300* >500*   < > 100*   URINEBILI Negative Negative Negative Negative   < > Negative   URINEKETONE Negative Negative Negative Negative   < > Negative   SG 1.008 1.007 1.010 1.014   < > 1.025   UBLD Negative Negative Negative Negative   < > Small*   URINEPH 5.0 6.5 7.5* 6.0   < > 6.0   PROTEIN >499* 100* >600* >500*   < > 100*   UROBILINOGEN  --   --   --   --   --  0.2   NITRITE Negative Negative Negative Negative   < > Negative   LEUKEST Negative Negative Small* Small*   < > Negative   RBCU 1 0 1 5*   < >  --    WBCU 2 1 44* 54*   < >  --     < > = values in this interval not displayed.     Recent Labs   Lab Test 05/24/19  1420 12/07/18  1417 11/01/18  1533 05/24/18  1441   UTPG 6.28* 5.60* 6.71* 5.97*     PTH  Recent Labs   Lab Test 01/18/19  1457 10/17/18  1413 05/24/18  1435   PTHI 126* 127* 55     IRON STUDIES  Recent Labs   Lab Test 04/24/19  1514 02/15/19  1459 12/07/18  1411   IRON 34* 61 42   * 249 240   IRONSAT 15 24 18   ELENA 298* 316* 348*       Silva Guerrero, NP

## 2019-05-28 ENCOUNTER — CARE COORDINATION (OUTPATIENT)
Dept: NEPHROLOGY | Facility: CLINIC | Age: 69
End: 2019-05-28

## 2019-05-28 DIAGNOSIS — Z79.4 TYPE 2 DIABETES MELLITUS WITH DIABETIC NEUROPATHY, WITH LONG-TERM CURRENT USE OF INSULIN (H): ICD-10-CM

## 2019-05-28 DIAGNOSIS — E11.40 TYPE 2 DIABETES MELLITUS WITH DIABETIC NEUROPATHY, WITH LONG-TERM CURRENT USE OF INSULIN (H): ICD-10-CM

## 2019-05-28 NOTE — PROGRESS NOTES
Per Ethel:    Pravin can you call daughter Caroline and have her hold both the Vit D and Calcitriol? Ca is a little bit elevated. I'll be checking an Ionized Ca next visit.     Called and left a VM for patient's daughter, Caroline, regarding these recommendations to hold calcitriol and vitamin D. Asked her to call back with any questions. I will be calling again next week to review patient's home BP readings after increasing amlodipine.    Pravin Cordova RN

## 2019-05-29 NOTE — TELEPHONE ENCOUNTER
"Requested Prescriptions   Pending Prescriptions Disp Refills     insulin glargine (BASAGLAR KWIKPEN) 100 UNIT/ML pen [Pharmacy Med Name: BASAGLAR 100 UNIT/ML KWIKPEN]  1     Sig: INJECT 28 UNITS SUBCUTANEOUS AT BEDTIME  Last Written Prescription Date:  02/15/2019  Last Fill Quantity: 2,  # refills: 3   Last office visit: 4/8/2019 with prescribing provider:  04/08/2019   Future Office Visit:         Long Acting Insulin Protocol Failed - 5/28/2019  6:23 PM        Failed - Blood pressure less than 140/90 in past 6 months     BP Readings from Last 3 Encounters:   05/24/19 200/70   05/06/19 164/71   04/29/19 187/74                 Failed - LDL on file in past 12 months     Recent Labs   Lab Test 03/29/18  1410   *             Failed - Microalbumin on file in past 12 months     Recent Labs   Lab Test 01/09/18  1041   MICROL 2,880   UMALCR 6,037.74*             Passed - Serum creatinine on file in past 12 months     Recent Labs   Lab Test 05/24/19  1410   CR 4.15*             Passed - HgbA1C in past 3 or 6 months     If HgbA1C is 8 or greater, it needs to be on file within the past 3 months.  If less than 8, must be on file within the past 6 months.     Recent Labs   Lab Test 04/05/19 06/22/18  1306   A1C  --   --  6.0*   HEMOGLOBINA1 6.0   < >  --     < > = values in this interval not displayed.             Passed - Medication is active on med list        Passed - Patient is age 18 or older        Passed - Recent (6 mo) or future (30 days) visit within the authorizing provider's specialty     Patient had office visit in the last 6 months or has a visit in the next 30 days with authorizing provider or within the authorizing provider's specialty.  See \"Patient Info\" tab in inbasket, or \"Choose Columns\" in Meds & Orders section of the refill encounter.            "

## 2019-05-29 NOTE — TELEPHONE ENCOUNTER
Dr. Jackson,    Please review/sign or advise for refill of insulin glargine (BASAGLAR KWIKPEN) 100 UNIT/ML pen.    Routing because there is a discrepancy in the dose. Request was for 28 units subcutaneous at HS. Last prescription on file from [facility] is from 02/15 to inject 22 units.    Please advise.    Ana Luisa Garcia RN  [facility]

## 2019-05-29 NOTE — PATIENT INSTRUCTIONS
You will be scheduled left upper arm AV graft surgery:  1. No blood draw and blood pressure from left arm  2. You will be scheduled an appointment with Pre op anesthesiology clinic (PAC) within 30 days prior to surgery  3. You need a ride after surgery and someone stay with you overnight on the surgery day  4. You will receive a call from a  for your PAC and surgery appointments  5. Wash your entire left arm with antibiotics soap the night before and morning of surgery    If you have questions and concerns, please contact dialysis access program [facility] SOLITARIO ([facility]) at [phone].  Thank you for choosing [facility] for your care.    Damaris Molina ([facility]) MS, APRN, CNS  Dialysis access program  Phone # [phone]

## 2019-05-30 RX ORDER — INSULIN GLARGINE 100 [IU]/ML
INJECTION, SOLUTION SUBCUTANEOUS
Qty: 45 ML | Refills: 1 | Status: SHIPPED | OUTPATIENT
Start: 2019-05-30 | End: 2019-08-02

## 2019-05-31 NOTE — PROGRESS NOTES
Patient's daughter, Caroline, called back. She got my message about stopping calcitriol and Vitamin D, and her mom has been off of those since 5/29.     She reports the following home BP readings since increasing amlodipine to 5 mg BID: 162/69, 154/70, 161/72, 160/74. Caroline does not think patient has had any increased edema.    Update sent to Ethel to review and advise.    Pravin Cordova RN

## 2019-05-31 NOTE — PROGRESS NOTES
Left second  for patient's daughter. Asked her to call me back to confirm that she has stopped Vit D and calcitriol. Would also like an update on patient's blood pressures since increasing amlodipine last week.    Pravin Cordova RN

## 2019-06-03 NOTE — PROGRESS NOTES
Reviewed patient's blood pressures with Ethel. She recommends no changes at this time. Patient to continue to monitor blood pressure at home and I will follow up with her/ her daughter again in one week.    Pravin Cordova, RN, BAN  Nephrology RN Care Coordinator

## 2019-06-06 ENCOUNTER — TELEPHONE (OUTPATIENT)
Dept: FAMILY MEDICINE | Facility: CLINIC | Age: 69
End: 2019-06-06

## 2019-06-06 DIAGNOSIS — Z79.4 TYPE 2 DIABETES MELLITUS WITH DIABETIC NEUROPATHY, WITH LONG-TERM CURRENT USE OF INSULIN (H): ICD-10-CM

## 2019-06-06 DIAGNOSIS — N18.5 CKD (CHRONIC KIDNEY DISEASE) STAGE 5, GFR LESS THAN 15 ML/MIN (H): Primary | ICD-10-CM

## 2019-06-06 DIAGNOSIS — Z74.09 IMMOBILITY: ICD-10-CM

## 2019-06-06 DIAGNOSIS — S78.112D: ICD-10-CM

## 2019-06-06 DIAGNOSIS — E11.40 TYPE 2 DIABETES MELLITUS WITH DIABETIC NEUROPATHY, WITH LONG-TERM CURRENT USE OF INSULIN (H): ICD-10-CM

## 2019-06-06 NOTE — TELEPHONE ENCOUNTER
Panel Management Review      Patient has the following on her problem list:     Diabetes    ASA: Not Required     Last A1C  Lab Results   Component Value Date    A1C 6.0 06/22/2018    A1C 5.4 03/29/2018    A1C 6.8 01/09/2018    A1C 9.6 06/09/2017    A1C 6.9 02/14/2017     A1C tested: Passed    Last LDL:    Lab Results   Component Value Date    CHOL 179 03/29/2018     Lab Results   Component Value Date    HDL 45 03/29/2018     Lab Results   Component Value Date     03/29/2018     Lab Results   Component Value Date    TRIG 131 03/29/2018     Lab Results   Component Value Date    CHOLHDLRATIO 4.5 02/20/2015     Lab Results   Component Value Date    NHDL 135 03/29/2018       Is the patient on a Statin? YES             Is the patient on Aspirin? NO    Medications     HMG CoA Reductase Inhibitors     atorvastatin (LIPITOR) 20 MG tablet             Last three blood pressure readings:  BP Readings from Last 3 Encounters:   05/24/19 200/70   05/06/19 164/71   04/29/19 187/74       Date of last diabetes office visit: 04/08/2019     Tobacco History:     History   Smoking Status     Current Every Day Smoker     Packs/day: 0.50     Years: 52.00     Types: Cigarettes     Start date: 1/31/1964     Last attempt to quit: 11/1/2017   Smokeless Tobacco     Never Used     Comment: 1 per day or less         Hypertension   Last three blood pressure readings:  BP Readings from Last 3 Encounters:   05/24/19 200/70   05/06/19 164/71   04/29/19 187/74     Blood pressure: FAILED    HTN Guidelines:  Less than 140/90      Composite cancer screening  Chart review shows that this patient is due/due soon for the following None  Summary:    Patient is due/failing the following:   BP CHECK    Action needed:   Routed to provider for review.    Type of outreach:    None, routed to provider for review.    Questions for provider review:    Spoke with her daughter as she is being closely followed by a lot of specialists and she has her blood  pressures checked multiple times a week. Should I continue to follow her as well? Regarding quality.                                                                                                                                    Petros Morse CMA.       Chart routed to none.

## 2019-06-06 NOTE — TELEPHONE ENCOUNTER
I have searched under meds for any DMEs for an order for mattress and also reviewed all of media for 2018 - nothing found    Attempted to reach azeem Wilcox. Left message  to call back.  Naty Boyle RN

## 2019-06-06 NOTE — TELEPHONE ENCOUNTER
[facility] calling back. Brenden has been her care coordinator since Nov 2018.   Previous care coordinator said she  had written an order    Pt never got the mattress.     She needs a new order to [facility], on [address] in [facility]. Fax [phone] (phone [phone])    Brenden does not know the specifics of what type of mattress. He will ask daughter to call here to give us the details of what type of mattress is on her hospital bed.    Naty Boyle, RN, BSN

## 2019-06-06 NOTE — TELEPHONE ENCOUNTER
Reason for call:  Other   Patient called regarding (reason for call): call back  Additional comments: Brenden a care coordinator with [employer] called with questions regarding an order for a mattress that was put in for the patient some time in 2018. He would like a call back to try to determine when the order was put in and any details he may need about the order.     Phone number to reach patient:  Other phone number: [phone]    Best Time: Any    Can we leave a detailed message on this number?  YES

## 2019-06-07 NOTE — TELEPHONE ENCOUNTER
Called patient's daughter, Caroline Gray. She is in a meeting right now and she will call at a later time    Cheryl Law, RN  Triage Nurse

## 2019-06-11 ENCOUNTER — CARE COORDINATION (OUTPATIENT)
Dept: NEPHROLOGY | Facility: CLINIC | Age: 69
End: 2019-06-11

## 2019-06-11 NOTE — TELEPHONE ENCOUNTER
Lynette from [facility] called and would like to know what she needs to get done to make sure patient can get her mattress. She put in the original order for the DME on 12/28/2018. She says to give her a call if she needs to resend the order so that this patient can get her mattress. Can call Lynette back at [phone]. Can leave detailed message at that number.

## 2019-06-11 NOTE — PROGRESS NOTES
Patient's daughter called to report recent BP reaidngs: 155/65, 147/70, 147/67, 162/73.     Reviewed with Ethel. No new orders/ med changes at this time. Daughter to continue to monitor at home and will review at next appointment.    Pravin Cordova, RN, BAN  Nephrology RN Care Coordinator

## 2019-06-11 NOTE — TELEPHONE ENCOUNTER
Dr. Renetta RUIZ cued    Left vm for Daughter Caroline Gray to return call    Viridiana Sprague RN   [facility]

## 2019-06-11 NOTE — TELEPHONE ENCOUNTER
Dr. Jackson    Spoke with Lynette Boyle OTR/L  [phone]    OK for mattress overlay due to DM, kidney disease, immobility  Length of need 99 months/lifetime  Height,  Weight,     DME cued    Needs also to be sent with DME  11/30 or more recent OV notes     DME vendor is [facility]           Fax number [phone]    Viridiana Sprague RN   [facility]

## 2019-06-20 NOTE — PROGRESS NOTES
Chief Complaint   Patient presents with     RECHECK     3 month follow up foot care            Allergies   Allergen Reactions     Penicillins Rash     Unasyn Rash     No evidence SJS, but very uncomfortable and precipitated multiple provider visits. Would not use penicillins again if other options available.          Subjective: Supriya is a 69 year old female who presents to the clinic today for a diabetic foot exam and management. She relates that she did get compression socks with the open toes. She intermittently wears these. Relates that she thinks she feels a bump on the bottom of the right foot.     Objective  Non-palpable DP and PT pulses BL.   Equinus noted BL. Pes planus with rigid toe deformities noted to lesser digits on the right. Left AKA noted.   Nails are thickened, discolored, elongated, with subungual debris consistent with onychomycosis.    Scabbed venous ulcer on the anterior right leg. No s/s of infection.  No open lesion associated. No bleeding. No pain to the area. Small scar noted to the plantar right 1st interspace. No s/s of infection. No hyperkeratosis.      Assessment: DM2 with left AKA and neuropathy - presenting for a diabetic foot exam.   Onychomycosis.      Plan:   - Pt seen and evaluated  - Nails debrided x 5.  - Cont compression socks.  - Watch area on the right foot.   - See again in 3 months.

## 2019-06-21 ENCOUNTER — OFFICE VISIT (OUTPATIENT)
Dept: ORTHOPEDICS | Facility: CLINIC | Age: 69
End: 2019-06-21
Payer: MEDICARE

## 2019-06-21 DIAGNOSIS — B35.1 OM (ONYCHOMYCOSIS): ICD-10-CM

## 2019-06-21 DIAGNOSIS — Z79.4 TYPE 2 DIABETES MELLITUS WITH DIABETIC NEUROPATHY, WITH LONG-TERM CURRENT USE OF INSULIN (H): Primary | ICD-10-CM

## 2019-06-21 DIAGNOSIS — E11.40 TYPE 2 DIABETES MELLITUS WITH DIABETIC NEUROPATHY, WITH LONG-TERM CURRENT USE OF INSULIN (H): Primary | ICD-10-CM

## 2019-06-21 NOTE — LETTER
6/21/2019       RE: Supriya Herr  [address]  [place] [address]     Dear Colleague,    Thank you for referring your patient, Supriya Herr, to [facility]. Please see a copy of my visit note below.    Chief Complaint   Patient presents with     RECHECK     3 month follow up foot care       Allergies   Allergen Reactions     Penicillins Rash     Unasyn Rash     No evidence SJS, but very uncomfortable and precipitated multiple provider visits. Would not use penicillins again if other options available.        Subjective: Supriya is a 69 year old female who presents to the clinic today for a diabetic foot exam and management.  She relates that she did get compression socks with the open toes. She intermittently wears these. Relates that she thinks she feels a bump on the bottom of the right foot.     Objective  Non-palpable DP and PT pulses BL.   Equinus noted BL. Pes planus with rigid toe deformities noted to lesser digits on the right. Left AKA noted.   Nails are thickened, discolored, elongated, with subungual debris consistent with onychomycosis.    Scabbed venous ulcer on the anterior right leg. No s/s of infection.  No open lesion associated. No bleeding. No pain to the area. Small scar noted to the plantar right 1st interspace. No s/s of infection. No hyperkeratosis.      Assessment: DM2 with left AKA and neuropathy - presenting for a diabetic foot exam.   Onychomycosis.      Plan:   - Pt seen and evaluated  - Nails debrided x 5.  - Cont compression socks.  - Watch area on the right foot.   - See again in 3 months.    Again, thank you for allowing me to participate in the care of your patient.      Sincerely,    Eleazar Pack DPM

## 2019-06-21 NOTE — NURSING NOTE
Reason For Visit:   Chief Complaint   Patient presents with     RECHECK     3 month follow up foot care       There were no vitals taken for this visit.    Pain Assessment  Patient Currently in Pain: Mariano Lu, ATC

## 2019-07-08 DIAGNOSIS — E11.3393: ICD-10-CM

## 2019-07-08 DIAGNOSIS — H26.9 NUCLEAR CATARACT, NONSENILE: Primary | ICD-10-CM

## 2019-07-09 ENCOUNTER — OFFICE VISIT (OUTPATIENT)
Dept: NEPHROLOGY | Facility: CLINIC | Age: 69
End: 2019-07-09
Payer: MEDICARE

## 2019-07-09 VITALS
HEART RATE: 66 BPM | DIASTOLIC BLOOD PRESSURE: 70 MMHG | SYSTOLIC BLOOD PRESSURE: 146 MMHG | BODY MASS INDEX: 31.11 KG/M2 | OXYGEN SATURATION: 98 % | HEIGHT: 66 IN

## 2019-07-09 DIAGNOSIS — E83.52 HYPERCALCEMIA: ICD-10-CM

## 2019-07-09 DIAGNOSIS — I10 HYPERTENSION GOAL BP (BLOOD PRESSURE) < 140/90: Primary | ICD-10-CM

## 2019-07-09 DIAGNOSIS — D63.1 ANEMIA IN STAGE 5 CHRONIC KIDNEY DISEASE, NOT ON CHRONIC DIALYSIS (H): ICD-10-CM

## 2019-07-09 DIAGNOSIS — E55.9 VITAMIN D DEFICIENCY: ICD-10-CM

## 2019-07-09 DIAGNOSIS — N18.5 CKD (CHRONIC KIDNEY DISEASE) STAGE 5, GFR LESS THAN 15 ML/MIN (H): ICD-10-CM

## 2019-07-09 DIAGNOSIS — N18.5 ANEMIA IN STAGE 5 CHRONIC KIDNEY DISEASE, NOT ON CHRONIC DIALYSIS (H): ICD-10-CM

## 2019-07-09 DIAGNOSIS — D63.1 ANEMIA DUE TO STAGE 5 CHRONIC KIDNEY DISEASE, NOT ON CHRONIC DIALYSIS (H): ICD-10-CM

## 2019-07-09 DIAGNOSIS — N18.5 ANEMIA DUE TO STAGE 5 CHRONIC KIDNEY DISEASE, NOT ON CHRONIC DIALYSIS (H): ICD-10-CM

## 2019-07-09 LAB
ALBUMIN SERPL-MCNC: 2.9 G/DL (ref 3.4–5)
ANION GAP SERPL CALCULATED.3IONS-SCNC: 8 MMOL/L (ref 3–14)
BASOPHILS # BLD AUTO: 0 10E9/L (ref 0–0.2)
BASOPHILS NFR BLD AUTO: 0.6 %
BUN SERPL-MCNC: 93 MG/DL (ref 7–30)
CA-I BLD-MCNC: 4.7 MG/DL (ref 4.4–5.2)
CALCIUM SERPL-MCNC: 8.4 MG/DL (ref 8.5–10.1)
CHLORIDE SERPL-SCNC: 111 MMOL/L (ref 94–109)
CO2 SERPL-SCNC: 23 MMOL/L (ref 20–32)
CREAT SERPL-MCNC: 5.52 MG/DL (ref 0.52–1.04)
DIFFERENTIAL METHOD BLD: ABNORMAL
EOSINOPHIL # BLD AUTO: 0.3 10E9/L (ref 0–0.7)
EOSINOPHIL NFR BLD AUTO: 4 %
ERYTHROCYTE [DISTWIDTH] IN BLOOD BY AUTOMATED COUNT: 12.9 % (ref 10–15)
FERRITIN SERPL-MCNC: 673 NG/ML (ref 8–252)
GFR SERPL CREATININE-BSD FRML MDRD: 7 ML/MIN/{1.73_M2}
GLUCOSE SERPL-MCNC: 121 MG/DL (ref 70–99)
HCT VFR BLD AUTO: 29.7 % (ref 35–47)
HGB BLD-MCNC: 9.3 G/DL (ref 11.7–15.7)
IMM GRANULOCYTES # BLD: 0 10E9/L (ref 0–0.4)
IMM GRANULOCYTES NFR BLD: 0.3 %
IRON SATN MFR SERPL: 23 % (ref 15–46)
IRON SERPL-MCNC: 55 UG/DL (ref 35–180)
LYMPHOCYTES # BLD AUTO: 1.6 10E9/L (ref 0.8–5.3)
LYMPHOCYTES NFR BLD AUTO: 24.9 %
MCH RBC QN AUTO: 30.3 PG (ref 26.5–33)
MCHC RBC AUTO-ENTMCNC: 31.3 G/DL (ref 31.5–36.5)
MCV RBC AUTO: 97 FL (ref 78–100)
MONOCYTES # BLD AUTO: 0.6 10E9/L (ref 0–1.3)
MONOCYTES NFR BLD AUTO: 10.2 %
NEUTROPHILS # BLD AUTO: 3.8 10E9/L (ref 1.6–8.3)
NEUTROPHILS NFR BLD AUTO: 60 %
NRBC # BLD AUTO: 0 10*3/UL
NRBC BLD AUTO-RTO: 0 /100
PHOSPHATE SERPL-MCNC: 5.8 MG/DL (ref 2.5–4.5)
PLATELET # BLD AUTO: 202 10E9/L (ref 150–450)
POTASSIUM SERPL-SCNC: 4.7 MMOL/L (ref 3.4–5.3)
RBC # BLD AUTO: 3.07 10E12/L (ref 3.8–5.2)
SODIUM SERPL-SCNC: 142 MMOL/L (ref 133–144)
TIBC SERPL-MCNC: 242 UG/DL (ref 240–430)
WBC # BLD AUTO: 6.3 10E9/L (ref 4–11)

## 2019-07-09 PROCEDURE — 36415 COLL VENOUS BLD VENIPUNCTURE: CPT

## 2019-07-09 PROCEDURE — 83550 IRON BINDING TEST: CPT

## 2019-07-09 PROCEDURE — 80069 RENAL FUNCTION PANEL: CPT

## 2019-07-09 PROCEDURE — G0463 HOSPITAL OUTPT CLINIC VISIT: HCPCS | Mod: ZF

## 2019-07-09 PROCEDURE — 83540 ASSAY OF IRON: CPT

## 2019-07-09 PROCEDURE — 85025 COMPLETE CBC W/AUTO DIFF WBC: CPT

## 2019-07-09 PROCEDURE — 82330 ASSAY OF CALCIUM: CPT

## 2019-07-09 PROCEDURE — 82728 ASSAY OF FERRITIN: CPT

## 2019-07-09 RX ORDER — FUROSEMIDE 40 MG
40 TABLET ORAL EVERY EVENING
Qty: 30 TABLET | Refills: 3 | Status: SHIPPED | OUTPATIENT
Start: 2019-07-09 | End: 2019-09-16

## 2019-07-09 RX ORDER — CHOLECALCIFEROL (VITAMIN D3) 50 MCG
2000 TABLET ORAL DAILY
Qty: 100 TABLET | Refills: 3
Start: 2019-07-09 | End: 2020-03-12

## 2019-07-09 RX ORDER — FUROSEMIDE 40 MG
40 TABLET ORAL EVERY EVENING
Qty: 30 TABLET | Refills: 3
Start: 2019-07-09 | End: 2019-07-09

## 2019-07-09 ASSESSMENT — PAIN SCALES - GENERAL: PAINLEVEL: NO PAIN (0)

## 2019-07-09 NOTE — LETTER
"7/9/2019      RE: Supriya Herr  [address]  [facility] [address]       Nephrology Clinic Visit 7/9/19    Assessment and Plan:     1. CKD5 w/proteinuria- Renal function is slowly declining. Creat 4.1, eGFR 10 ml/mn, UPCR 6.28 g/gCr. No uremic symptoms   - Etiology of CKD felt to be DM, HTN, Vascular dz,    - Inactive on transplant wait list  as of 4/18 due to being \"too sick\". Will message coordinator   - Has been seen by Dr Martin and will need AVG for HD.    - Patient and daughter becoming more accepting of Supriya starting HD. They would like to take their annual mother daughter trip prior to starting HD.    - We have decided that if eGFR is ~ 5 ml/mn next visit, we will begin dialysis unit search and schedule her access placement in preparation for HD initiation. She is hoping to be able to find an HD unit where she could run later in the day so she can continue at the day program.    - No need to initiate at this time, no uremic symptoms, lytes, bicarb fine   - Does not use NSAIDs   - Blood pressures acceptable today.   - DM with excellent control. A1c 6.0 % 4/19   - On statin for CV risk reduction     2. Volume status - Euvolemic. No edema. Blood pressures  borderline. No current weight given amputation.   Remains on Lasix 60 mg am, 40 mg pm     3. HTN -  Control much improved. Clinic blood pressures 137-149/69-70. HR 66. Outside blood pressures in the 150-160/ range, but no way to evaluate device.   Current regimen:    - lasix 60/40   - Coreg 12.5 mg bid   - Amlodipine 5 mg bid       -  Have not increased Coreg due to HR in the 60's    - Continue home monitoring   - Blood pressure goal < 130/80     4. Electrolytes - No acute concerns. K 4.7, Na 142     5. Acid base - No acute concerns. Bicarb 23     6. BMD - ICa 4.7, Phos 5.8, Albumin 2.9   - Vit D 27 (10/18)   -  (1/19)   - OK to restart Vit D, but will hold Calcitriol given recent hypercalcemia   - Patient will work on dietary " modification of phos, however if Phos ~ 6.0 next visit will start Renvela.      7. Anemia - Hgb 9.3   - Iron studies : Ferritin 673, Fe 55, IS 23.    - Has not required KELLIE given baseline hgb ~ 10   - Colonoscopy 2018, tubular adenomas     8. DM2 - Well controlled. A1c 6.0 %. On Insulin      9. Depression - Controlled with increase in Zoloft to 50 mg every day.       10. Disposition - RTC 1 month for f/u     Assessment and plan was discussed with patient and she voiced her understanding and agreement.     Reason for Visit:  CKD5/HTN f/u     HPI:  Ms Herr is a 68 yo female with CKD5 and nephrotic range proteinuria, DM2, HTN, in today for routine CKD f/u. Last seen by me on 19. Amlodipine increased at last visit. Patient nearing dialysis with ongoing increase in creat.      Interval Hx:   No hospital admissions     ROS:   Blood pressures at day program remain in the 150-160/ range, but clinic blood pressures much improved.   No acute concerns.   Anxious around our upcoming discussion regarding starting HD  Ashlie are planning a mother/daughter trip for prior to starting HD  Has good appetite. Blood sugars generally in target. Denies dyspnea, chest pain, abdominal pain. No bowel or bladder concerns. Doesn't yet have prosthesis.      Chronic Health Problems:     CKD5  Proteinuria  AKA, left (10/18)  T2DM  Vit D def  HTN  HLD  Anxiety/depression  Vitiligo  Non proliferative diabetic retinopathy  BCC  JONE  Anemia  Prior tobacco abuse     Personal Hx:   [facility], daughter Caroline upper level. NS. Attends day program at [facility] Monday through Thursday    Family Hx:   Family History   Problem Relation Age of Onset     Diabetes Mother      Hypertension Mother      Heart Disease Mother          of congestive heart failure     Eye Disorder Mother      Arthritis Mother      Obesity Mother      Heart Disease Father          from CHF     Cerebrovascular Disease Father       "Arthritis Father      Musculoskeletal Disorder Other         has MS     Thyroid Disease Other      Eye Disorder Other         cataracts     Cancer Other         throat/liver     Skin Cancer No family hx of      Melanoma No family hx of      Glaucoma No family hx of      Macular Degeneration No family hx of          Allergies:  Allergies   Allergen Reactions     Penicillins Rash     Unasyn Rash     No evidence SJS, but very uncomfortable and precipitated multiple provider visits. Would not use penicillins again if other options available.        Medications:  Current Outpatient Medications   Medication Sig     acetaminophen (TYLENOL) 325 MG tablet Take 2 tablets (650 mg) by mouth every 4 hours as needed for mild pain     albuterol (PROAIR HFA, PROVENTIL HFA, VENTOLIN HFA) 108 (90 BASE) MCG/ACT inhaler Inhale 2 puffs into the lungs every 6 hours as needed for shortness of breath / dyspnea or wheezing     amLODIPine (NORVASC) 5 MG tablet Take 1 tablet (5 mg) by mouth 2 times daily     atorvastatin (LIPITOR) 20 MG tablet TAKE 1 TABLET BY MOUTH ONCE DAILY     blood glucose (ONETOUCH ULTRA) test strip TEST YOUR BLOOD SUGAR 3-4 TIMES PER DAY.     carvedilol (COREG) 12.5 MG tablet TAKE 1 TABLET (12.5 MG) BY MOUTH 2 TIMES DAILY (WITH MEALS)     clindamycin (CLINDAMAX) 1 % topical gel Apply topically 2 times daily     furosemide (LASIX) 40 MG tablet Take 1 tablet (40 mg) by mouth every evening Taking 60 mg AM/40 mg PM     insulin aspart (NOVOLOG FLEXPEN) 100 UNIT/ML pen Inject 4 units SQ with breakfast, lunch and dinner.     insulin glargine (BASAGLAR KWIKPEN) 100 UNIT/ML pen Inject 22 Units Subcutaneous At Bedtime Inject 22 U SubCut at HS     insulin pen needle (ULTICARE MINI) 31G X 6 MM Use daily or as directed.     order for DME Equipment being ordered: mattress overlay for hospital bed  Wt. 192#  Height 5'5\"  99 months/Lifetime     order for DME Compression socks - knee  15-20 mmHg  Open toed.  Use daily.     order for " DME Equipment being ordered: Mattress Overlay for Hospital Bed. Height 65. Weight 168 lbs.  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     order for DME 1 wheelchair     order for DME Equipment being ordered: TEDS stocking   Below the knee 15-20 mg  Dispense 2  Use daily     ORDER FOR DME Equipment being ordered: Compression stockings, 20-30 MMHG, knee high     sertraline (ZOLOFT) 50 MG tablet Take 1 tablet (50 mg) by mouth daily     triamcinolone (KENALOG) 0.1 % external cream Apply to sites of dermatitis- the abdomen, armpits, groin as needed.     vitamin D3 (CHOLECALCIFEROL) 2000 units (50 mcg) tablet Take 1 tablet (2,000 Units) by mouth daily     furosemide (LASIX) 80 MG tablet Take 60 mg by mouth every morning     insulin glargine (BASAGLAR KWIKPEN) 100 UNIT/ML pen INJECT 28 UNITS SUBCUTANEOUS AT BEDTIME (Patient not taking: Reported on 7/9/2019)     No current facility-administered medications for this visit.       Vitals:  B/P 137-149/69-70  P 66  No weight    Exam:  GEN: Pleasant female in NAD. Daughter present.  CARDIAC: RRR  LUNGS: CTA  ABDOMEN: Soft NT  EXT: Left LORI, Right tr edema  NEURO: alert. Oriented. No asterixis    LABS:   CMP  Recent Labs   Lab Test 07/09/19  1513 05/24/19  1410 04/24/19  1514 02/15/19  1459    144 142 144   POTASSIUM 4.7 4.7 4.1 4.3   CHLORIDE 111* 113* 110* 110*   CO2 23 23 23 28   ANIONGAP 8 8 9 6   * 66* 176* 126*   BUN 93* 98* 98* 75*   CR 5.52* 4.15* 4.52* 3.53*   GFRESTIMATED 7* 10* 9* 12*   GFRESTBLACK 8* 12* 11* 14*   JODY 8.4* 9.8 8.7 8.6     Recent Labs   Lab Test 07/31/18  1148 07/24/18  0900 07/17/18  0830 06/24/18  0623 06/23/18  9591  03/29/18  1410 11/13/17  0715   BILITOTAL  --   --   --  0.2 0.2 0.2 0.3   ALKPHOS  --   --   --  49 56 56 70   ALT  --   --   --  17 13 15 37   AST 24 24 12 16 12 14 30     CBC  Recent Labs   Lab Test 07/09/19  1513 04/24/19  1514 02/15/19  1459 01/18/19  1457 12/07/18  1411   HGB 9.3* 8.2* 10.3* 10.2* 10.3*   WBC 6.3  --  5.5 5.4 5.7   RBC 3.07*  --  3.40* 3.47* 3.47*   HCT 29.7*  --  31.6* 32.4* 32.3*   MCV 97  --  93 93 93   MCH 30.3  --  30.3 29.4 29.7   MCHC 31.3*  --  32.6 31.5 31.9   RDW 12.9  --  12.5 12.4 12.4     --  232 222 237     URINE STUDIES  Recent Labs   Lab Test 03/29/18  1448 01/25/18  0233 11/02/17  0602 10/26/16  1220  12/05/12  1541   COLOR Straw Light Yellow Light Yellow Yellow   < > Yellow   APPEARANCE Clear Clear Clear Slightly Cloudy   < > Clear   URINEGLC 50* Negative 300* >500*   < > 100*   URINEBILI Negative Negative Negative Negative   < > Negative   URINEKETONE Negative Negative Negative Negative   < > Negative   SG 1.008 1.007 1.010 1.014   < > 1.025   UBLD Negative Negative Negative Negative   < > Small*   URINEPH 5.0 6.5 7.5* 6.0   < > 6.0   PROTEIN >499* 100* >600* >500*   < > 100*   UROBILINOGEN  --   --   --   --   --  0.2   NITRITE Negative Negative Negative Negative   < > Negative   LEUKEST Negative Negative Small* Small*   < > Negative   RBCU 1 0 1 5*   < >  --    WBCU 2 1 44* 54*   < >  --     < > = values in this interval not displayed.     Recent Labs   Lab Test 05/24/19  1420 12/07/18  1417 11/01/18  1533 05/24/18  1441   UTPG 6.28* 5.60* 6.71* 5.97*     PTH  Recent Labs   Lab Test 01/18/19  1457 10/17/18  1413 05/24/18  1435   PTHI 126* 127* 55     IRON STUDIES  Recent Labs   Lab Test 07/09/19  1513 04/24/19  1514 02/15/19  1459   IRON 55 34* 61    227* 249   IRONSAT 23 15 24   ELENA 673* 298* 316*       Silva Guerrero, APRN, CNP

## 2019-07-09 NOTE — LETTER
"7/9/2019       RE: Supriya Herr  [address]  [facility] [address]     Dear Colleague,    Thank you for referring your patient, Supriya Herr, to the [facility] NEPHROLOGY at [facility]. Please see a copy of my visit note below.    Nephrology Clinic Visit 7/9/19    Assessment and Plan:     1. CKD5 w/proteinuria- Renal function is slowly declining. Creat 4.1, eGFR 10 ml/mn, UPCR 6.28 g/gCr. No uremic symptoms   - Etiology of CKD felt to be DM, HTN, Vascular dz,    - Inactive on transplant wait list  as of 4/18 due to being \"too sick\". Will message coordinator   - Has been seen by Dr Martin and will need AVG for HD.    - Patient and daughter becoming more accepting of Supriya starting HD. They would like to take their annual mother daughter trip prior to starting HD.    - We have decided that if eGFR is ~ 5 ml/mn next visit, we will begin dialysis unit search and schedule her access placement in preparation for HD initiation. She is hoping to be able to find an HD unit where she could run later in the day so she can continue at the day program.    - No need to initiate at this time, no uremic symptoms, lytes, bicarb fine   - Does not use NSAIDs   - Blood pressures acceptable today.   - DM with excellent control. A1c 6.0 % 4/19   - On statin for CV risk reduction     2. Volume status - Euvolemic. No edema. Blood pressures  borderline. No current weight given amputation.   Remains on Lasix 60 mg am, 40 mg pm     3. HTN -  Control much improved. Clinic blood pressures 137-149/69-70. HR 66. Outside blood pressures in the 150-160/ range, but no way to evaluate device.   Current regimen:    - lasix 60/40   - Coreg 12.5 mg bid   - Amlodipine 5 mg bid       -  Have not increased Coreg due to HR in the 60's    - Continue home monitoring   - Blood pressure goal < 130/80     4. Electrolytes - No acute concerns. K 4.7, Na 142     5. Acid base - No acute concerns. Bicarb " 23     6. BMD - ICa 4.7, Phos 5.8, Albumin 2.9   - Vit D 27 (10/18)   -  (1/19)   - OK to restart Vit D, but will hold Calcitriol given recent hypercalcemia   - Patient will work on dietary modification of phos, however if Phos ~ 6.0 next visit will start Renvela.      7. Anemia - Hgb 9.3   - Iron studies 7/19: Ferritin 673, Fe 55, IS 23.    - Has not required KELLIE given baseline hgb ~ 10   - Colonoscopy 2018, tubular adenomas     8. DM2 - Well controlled. A1c 6.0 %. On Insulin      9. Depression - Controlled with increase in Zoloft to 50 mg every day.       10. Disposition - RTC 1 month for f/u     Assessment and plan was discussed with patient and she voiced her understanding and agreement.     Reason for Visit:  CKD5/HTN f/u     HPI:  Ms Herr is a 68 yo female with CKD5 and nephrotic range proteinuria, DM2, HTN, in today for routine CKD f/u. Last seen by me on 5/24/19. Amlodipine increased at last visit. Patient nearing dialysis with ongoing increase in creat.      Interval Hx:   No hospital admissions     ROS:   Blood pressures at day program remain in the 150-160/ range, but clinic blood pressures much improved.   No acute concerns.   Anxious around our upcoming discussion regarding starting HD  Caroline and Supriya are planning a mother/daughter trip for prior to starting HD  Has good appetite. Blood sugars generally in target. Denies dyspnea, chest pain, abdominal pain. No bowel or bladder concerns. Doesn't yet have prosthesis.      Chronic Health Problems:     CKD5  Proteinuria  AKA, left (10/18)  T2DM  Vit D def  HTN  HLD  Anxiety/depression  Vitiligo  Non proliferative diabetic retinopathy  BCC  JONE  Anemia  Prior tobacco abuse     Personal Hx:   Lives in lower level of [facility], daughter Caroline upper level. NS. Attends day program at [facility] Monday through Thursday    Family Hx:   Family History   Problem Relation Age of Onset     Diabetes Mother      Hypertension Mother      Heart Disease Mother           of congestive heart failure     Eye Disorder Mother      Arthritis Mother      Obesity Mother      Heart Disease Father          from CHF     Cerebrovascular Disease Father      Arthritis Father      Musculoskeletal Disorder Other         has MS     Thyroid Disease Other      Eye Disorder Other         cataracts     Cancer Other         throat/liver     Skin Cancer No family hx of      Melanoma No family hx of      Glaucoma No family hx of      Macular Degeneration No family hx of          Allergies:  Allergies   Allergen Reactions     Penicillins Rash     Unasyn Rash     No evidence SJS, but very uncomfortable and precipitated multiple provider visits. Would not use penicillins again if other options available.        Medications:  Current Outpatient Medications   Medication Sig     acetaminophen (TYLENOL) 325 MG tablet Take 2 tablets (650 mg) by mouth every 4 hours as needed for mild pain     albuterol (PROAIR HFA, PROVENTIL HFA, VENTOLIN HFA) 108 (90 BASE) MCG/ACT inhaler Inhale 2 puffs into the lungs every 6 hours as needed for shortness of breath / dyspnea or wheezing     amLODIPine (NORVASC) 5 MG tablet Take 1 tablet (5 mg) by mouth 2 times daily     atorvastatin (LIPITOR) 20 MG tablet TAKE 1 TABLET BY MOUTH ONCE DAILY     blood glucose (ONETOUCH ULTRA) test strip TEST YOUR BLOOD SUGAR 3-4 TIMES PER DAY.     carvedilol (COREG) 12.5 MG tablet TAKE 1 TABLET (12.5 MG) BY MOUTH 2 TIMES DAILY (WITH MEALS)     clindamycin (CLINDAMAX) 1 % topical gel Apply topically 2 times daily     furosemide (LASIX) 40 MG tablet Take 1 tablet (40 mg) by mouth every evening Taking 60 mg AM/40 mg PM     insulin aspart (NOVOLOG FLEXPEN) 100 UNIT/ML pen Inject 4 units SQ with breakfast, lunch and dinner.     insulin glargine (BASAGLAR KWIKPEN) 100 UNIT/ML pen Inject 22 Units Subcutaneous At Bedtime Inject 22 U SubCut at HS     insulin pen needle (ULTICARE MINI) 31G X 6 MM Use daily or as directed.     order for DME Equipment  "being ordered: mattress overlay for hospital bed  Wt. 192#  Height 5'5\"  99 months/Lifetime     order for DME Compression socks - knee  15-20 mmHg  Open toed.  Use daily.     order for DME Equipment being ordered: Mattress Overlay for Hospital Bed. Height 65. Weight 168 lbs.  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     order for DME 1 wheelchair     order for DME Equipment being ordered: TEDS stocking   Below the knee 15-20 mg  Dispense 2  Use daily     ORDER FOR DME Equipment being ordered: Compression stockings, 20-30 MMHG, knee high     sertraline (ZOLOFT) 50 MG tablet Take 1 tablet (50 mg) by mouth daily     triamcinolone (KENALOG) 0.1 % external cream Apply to sites of dermatitis- the abdomen, armpits, groin as needed.     vitamin D3 (CHOLECALCIFEROL) 2000 units (50 mcg) tablet Take 1 tablet (2,000 Units) by mouth daily     furosemide (LASIX) 80 MG tablet Take 60 mg by mouth every morning     insulin glargine (BASAGLAR KWIKPEN) 100 UNIT/ML pen INJECT 28 UNITS SUBCUTANEOUS AT BEDTIME (Patient not taking: Reported on 7/9/2019)     No current facility-administered medications for this visit.       Vitals:  B/P 137-149/69-70  P 66  No weight    Exam:  GEN: Pleasant female in NAD. Daughter present.  CARDIAC: RRR  LUNGS: CTA  ABDOMEN: Soft NT  EXT: Left LORI, Right tr edema  NEURO: alert. Oriented. No asterixis    LABS:   CMP  Recent Labs   Lab Test 07/09/19  1513 05/24/19  1410 04/24/19  1514 02/15/19  1459    144 142 144   POTASSIUM 4.7 4.7 4.1 4.3   CHLORIDE 111* 113* 110* 110*   CO2 23 23 23 28   ANIONGAP 8 8 9 6   * 66* 176* 126*   BUN 93* 98* 98* 75*   CR 5.52* 4.15* 4.52* 3.53* "   GFRESTIMATED 7* 10* 9* 12*   GFRESTBLACK 8* 12* 11* 14*   JODY 8.4* 9.8 8.7 8.6     Recent Labs   Lab Test 07/31/18  1148 07/24/18  0900 07/17/18  0830 06/24/18  0623 06/23/18  2347 03/29/18  1410 11/13/17  0715   BILITOTAL  --   --   --  0.2 0.2 0.2 0.3   ALKPHOS  --   --   --  49 56 56 70   ALT  --   --   --  17 13 15 37   AST 24 24 12 16 12 14 30     CBC  Recent Labs   Lab Test 07/09/19  1513 04/24/19  1514 02/15/19  1459 01/18/19  1457 12/07/18  1411   HGB 9.3* 8.2* 10.3* 10.2* 10.3*   WBC 6.3  --  5.5 5.4 5.7   RBC 3.07*  --  3.40* 3.47* 3.47*   HCT 29.7*  --  31.6* 32.4* 32.3*   MCV 97  --  93 93 93   MCH 30.3  --  30.3 29.4 29.7   MCHC 31.3*  --  32.6 31.5 31.9   RDW 12.9  --  12.5 12.4 12.4     --  232 222 237     URINE STUDIES  Recent Labs   Lab Test 03/29/18  1448 01/25/18  0233 11/02/17  0602 10/26/16  1220  12/05/12  1541   COLOR Straw Light Yellow Light Yellow Yellow   < > Yellow   APPEARANCE Clear Clear Clear Slightly Cloudy   < > Clear   URINEGLC 50* Negative 300* >500*   < > 100*   URINEBILI Negative Negative Negative Negative   < > Negative   URINEKETONE Negative Negative Negative Negative   < > Negative   SG 1.008 1.007 1.010 1.014   < > 1.025   UBLD Negative Negative Negative Negative   < > Small*   URINEPH 5.0 6.5 7.5* 6.0   < > 6.0   PROTEIN >499* 100* >600* >500*   < > 100*   UROBILINOGEN  --   --   --   --   --  0.2   NITRITE Negative Negative Negative Negative   < > Negative   LEUKEST Negative Negative Small* Small*   < > Negative   RBCU 1 0 1 5*   < >  --    WBCU 2 1 44* 54*   < >  --     < > = values in this interval not displayed.     Recent Labs   Lab Test 05/24/19  1420 12/07/18  1417 11/01/18  1533 05/24/18  1441   UTPG 6.28* 5.60* 6.71* 5.97*     PTH  Recent Labs   Lab Test 01/18/19  1457 10/17/18  1413 05/24/18  1435   PTHI 126* 127* 55     IRON STUDIES  Recent Labs   Lab Test 07/09/19  1513 04/24/19  1514 02/15/19  1459   IRON 55 34* 61    227* 249   IRONSAT 23 15 24    ELENA 673* 298* 316*       Silva Guerrero, APRN, CNP

## 2019-07-10 NOTE — PROGRESS NOTES
"Nephrology Clinic Visit 7/9/19    Assessment and Plan:     1. CKD5 w/proteinuria- Renal function is slowly declining. Creat 4.1, eGFR 10 ml/mn, UPCR 6.28 g/gCr. No uremic symptoms   - Etiology of CKD felt to be DM, HTN, Vascular dz,    - Inactive on transplant wait list as of 4/18 due to being \"too sick\". Will message coordinator   - Has been seen by Dr Martin and will need AVG for HD.    - Patient and daughter becoming more accepting of Supriya starting HD. They would like to take their annual mother daughter trip prior to starting HD.    - We have decided that if eGFR is ~ 5 ml/mn next visit, we will begin dialysis unit search and schedule her access placement in preparation for HD initiation. She is hoping to be able to find an HD unit where she could run later in the day so she can continue at the day program.    - No need to initiate at this time, no uremic symptoms, lytes, bicarb fine   - Does not use NSAIDs   - Blood pressures acceptable today.   - DM with excellent control. A1c 6.0 % 4/19   - On statin for CV risk reduction     2. Volume status - Euvolemic. No edema. Blood pressures borderline. No current weight given amputation.   Remains on Lasix 60 mg am, 40 mg pm     3. HTN - Control much improved. Clinic blood pressures 137-149/69-70. HR 66. Outside blood pressures in the 150-160/ range, but no way to evaluate device.   Current regimen:    - lasix 60/40   - Coreg 12.5 mg bid   - Amlodipine 5 mg bid       - Have not increased Coreg due to HR in the 60's    - Continue home monitoring   - Blood pressure goal < 130/80     4. Electrolytes - No acute concerns. K 4.7, Na 142     5. Acid base - No acute concerns. Bicarb 23     6. BMD - ICa 4.7, Phos 5.8, Albumin 2.9   - Vit D 27 (10/18)   -  (1/19)   - OK to restart Vit D, but will hold Calcitriol given recent hypercalcemia   - Patient will work on dietary modification of phos, however if Phos ~ 6.0 next visit will start Renvela.      7. Anemia - Hgb " 9.3   - Iron studies : Ferritin 673, Fe 55, IS 23.    - Has not required KELLIE given baseline hgb ~ 10   - Colonoscopy 2018, tubular adenomas     8. DM2 - Well controlled. A1c 6.0 %. On Insulin      9. Depression - Controlled with increase in Zoloft to 50 mg every day.       10. Disposition - RTC 1 month for f/u     Assessment and plan was discussed with patient and she voiced her understanding and agreement.     Reason for Visit:  CKD5/HTN f/u     HPI:  Ms Herr is a 68 yo female with CKD5 and nephrotic range proteinuria, DM2, HTN, in today for routine CKD f/u. Last seen by me on 19. Amlodipine increased at last visit. Patient nearing dialysis with ongoing increase in creat.      Interval Hx:   No hospital admissions     ROS:   Blood pressures at day program remain in the 150-160/ range, but clinic blood pressures much improved.   No acute concerns.   Anxious around our upcoming discussion regarding starting HD  Ashlie are planning a mother/daughter trip for prior to starting HD  Has good appetite. Blood sugars generally in target. Denies dyspnea, chest pain, abdominal pain. No bowel or bladder concerns. Doesn't yet have prosthesis.      Chronic Health Problems:     CKD5  Proteinuria  AKA, left (10/18)  T2DM  Vit D def  HTN  HLD  Anxiety/depression  Vitiligo  Non proliferative diabetic retinopathy  BCC  JONE  Anemia  Prior tobacco abuse     Personal Hx:   Lives in lower level of [facility], daughter Caroline upper level. [place]. Attends day program at [facility] Monday through Thursday    Family Hx:   Family History   Problem Relation Age of Onset     Diabetes Mother      Hypertension Mother      Heart Disease Mother          of congestive heart failure     Eye Disorder Mother      Arthritis Mother      Obesity Mother      Heart Disease Father          from CHF     Cerebrovascular Disease Father      Arthritis Father      Musculoskeletal Disorder Other         has MS     Thyroid Disease Other      Eye  "Disorder Other         cataracts     Cancer Other         throat/liver     Skin Cancer No family hx of      Melanoma No family hx of      Glaucoma No family hx of      Macular Degeneration No family hx of          Allergies:  Allergies   Allergen Reactions     Penicillins Rash     Unasyn Rash     No evidence SJS, but very uncomfortable and precipitated multiple provider visits. Would not use penicillins again if other options available.        Medications:  Current Outpatient Medications   Medication Sig     acetaminophen (TYLENOL) 325 MG tablet Take 2 tablets (650 mg) by mouth every 4 hours as needed for mild pain     albuterol (PROAIR HFA, PROVENTIL HFA, VENTOLIN HFA) 108 (90 BASE) MCG/ACT inhaler Inhale 2 puffs into the lungs every 6 hours as needed for shortness of breath / dyspnea or wheezing     amLODIPine (NORVASC) 5 MG tablet Take 1 tablet (5 mg) by mouth 2 times daily     atorvastatin (LIPITOR) 20 MG tablet TAKE 1 TABLET BY MOUTH ONCE DAILY     blood glucose (ONETOUCH ULTRA) test strip TEST YOUR BLOOD SUGAR 3-4 TIMES PER DAY.     carvedilol (COREG) 12.5 MG tablet TAKE 1 TABLET (12.5 MG) BY MOUTH 2 TIMES DAILY (WITH MEALS)     clindamycin (CLINDAMAX) 1 % topical gel Apply topically 2 times daily     furosemide (LASIX) 40 MG tablet Take 1 tablet (40 mg) by mouth every evening Taking 60 mg AM/40 mg PM     insulin aspart (NOVOLOG FLEXPEN) 100 UNIT/ML pen Inject 4 units SQ with breakfast, lunch and dinner.     insulin glargine (BASAGLAR KWIKPEN) 100 UNIT/ML pen Inject 22 Units Subcutaneous At Bedtime Inject 22 U SubCut at HS     insulin pen needle (ULTICARE MINI) 31G X 6 MM Use daily or as directed.     order for DME Equipment being ordered: mattress overlay for hospital bed  Wt. 192#  Height 5'5\"  99 months/Lifetime     order for DME Compression socks - knee  15-20 mmHg  Open toed.  Use daily.     order for DME Equipment being ordered: Mattress Overlay for Hospital Bed. Height 65. Weight 168 lbs.  Length of " need: Lifetime     order for DME Equipment being ordered: toilet riser, lifetime need  DX amputation of leg above the knee, S78.119     order for DME 1 SAD light     order for DME Equipment being ordered: Right Lower extremity Solaris Ready wrap calf piece:  Size small/length tall , knee piece: Size small , Thigh piece size small/length average.     order for DME 1 wheelchair     order for DME Equipment being ordered: TEDS stocking   Below the knee 15-20 mg  Dispense 2  Use daily     ORDER FOR DME Equipment being ordered: Compression stockings, 20-30 MMHG, knee high     sertraline (ZOLOFT) 50 MG tablet Take 1 tablet (50 mg) by mouth daily     triamcinolone (KENALOG) 0.1 % external cream Apply to sites of dermatitis- the abdomen, armpits, groin as needed.     vitamin D3 (CHOLECALCIFEROL) 2000 units (50 mcg) tablet Take 1 tablet (2,000 Units) by mouth daily     furosemide (LASIX) 80 MG tablet Take 60 mg by mouth every morning     insulin glargine (BASAGLAR KWIKPEN) 100 UNIT/ML pen INJECT 28 UNITS SUBCUTANEOUS AT BEDTIME (Patient not taking: Reported on 7/9/2019)     No current facility-administered medications for this visit.       Vitals:  B/P 137-149/69-70  P 66  No weight    Exam:  GEN: Pleasant female in NAD. Daughter present.  CARDIAC: RRR  LUNGS: CTA  ABDOMEN: Soft NT  EXT: Left LORI, Right tr edema  NEURO: alert. Oriented. No asterixis    LABS:   CMP  Recent Labs   Lab Test 07/09/19  1513 05/24/19  1410 04/24/19  1514 02/15/19  1459    144 142 144   POTASSIUM 4.7 4.7 4.1 4.3   CHLORIDE 111* 113* 110* 110*   CO2 23 23 23 28   ANIONGAP 8 8 9 6   * 66* 176* 126*   BUN 93* 98* 98* 75*   CR 5.52* 4.15* 4.52* 3.53*   GFRESTIMATED 7* 10* 9* 12*   GFRESTBLACK 8* 12* 11* 14*   JODY 8.4* 9.8 8.7 8.6     Recent Labs   Lab Test 07/31/18  1148 07/24/18  0900 07/17/18  0830 06/24/18  0623 06/23/18  2347 03/29/18  1410 11/13/17  0715   BILITOTAL  --   --   --  0.2 0.2 0.2 0.3   ALKPHOS  --   --   --  49 56 56 70    ALT  --   --   --  17 13 15 37   AST 24 24 12 16 12 14 30     CBC  Recent Labs   Lab Test 07/09/19  1513 04/24/19  1514 02/15/19  1459 01/18/19  1457 12/07/18  1411   HGB 9.3* 8.2* 10.3* 10.2* 10.3*   WBC 6.3  --  5.5 5.4 5.7   RBC 3.07*  --  3.40* 3.47* 3.47*   HCT 29.7*  --  31.6* 32.4* 32.3*   MCV 97  --  93 93 93   MCH 30.3  --  30.3 29.4 29.7   MCHC 31.3*  --  32.6 31.5 31.9   RDW 12.9  --  12.5 12.4 12.4     --  232 222 237     URINE STUDIES  Recent Labs   Lab Test 03/29/18  1448 01/25/18  0233 11/02/17  0602 10/26/16  1220  12/05/12  1541   COLOR Straw Light Yellow Light Yellow Yellow   < > Yellow   APPEARANCE Clear Clear Clear Slightly Cloudy   < > Clear   URINEGLC 50* Negative 300* >500*   < > 100*   URINEBILI Negative Negative Negative Negative   < > Negative   URINEKETONE Negative Negative Negative Negative   < > Negative   SG 1.008 1.007 1.010 1.014   < > 1.025   UBLD Negative Negative Negative Negative   < > Small*   URINEPH 5.0 6.5 7.5* 6.0   < > 6.0   PROTEIN >499* 100* >600* >500*   < > 100*   UROBILINOGEN  --   --   --   --   --  0.2   NITRITE Negative Negative Negative Negative   < > Negative   LEUKEST Negative Negative Small* Small*   < > Negative   RBCU 1 0 1 5*   < >  --    WBCU 2 1 44* 54*   < >  --     < > = values in this interval not displayed.     Recent Labs   Lab Test 05/24/19  1420 12/07/18  1417 11/01/18  1533 05/24/18  1441   UTPG 6.28* 5.60* 6.71* 5.97*     PTH  Recent Labs   Lab Test 01/18/19  1457 10/17/18  1413 05/24/18  1435   PTHI 126* 127* 55     IRON STUDIES  Recent Labs   Lab Test 07/09/19  1513 04/24/19  1514 02/15/19  1459   IRON 55 34* 61    227* 249   IRONSAT 23 15 24   ELENA 673* 298* 316*       Silva Guerrero, APRN, CNP

## 2019-07-11 ENCOUNTER — OFFICE VISIT (OUTPATIENT)
Dept: OPHTHALMOLOGY | Facility: CLINIC | Age: 69
End: 2019-07-11
Attending: OPHTHALMOLOGY
Payer: MEDICARE

## 2019-07-11 DIAGNOSIS — E11.3393: ICD-10-CM

## 2019-07-11 DIAGNOSIS — H04.123 DRY EYE SYNDROME OF BOTH EYES: Primary | ICD-10-CM

## 2019-07-11 PROCEDURE — 68761 CLOSE TEAR DUCT OPENING: CPT | Mod: ZF,50 | Performed by: OPHTHALMOLOGY

## 2019-07-11 PROCEDURE — 92015 DETERMINE REFRACTIVE STATE: CPT | Mod: GY,ZF

## 2019-07-11 PROCEDURE — G0463 HOSPITAL OUTPT CLINIC VISIT: HCPCS | Mod: ZF,25

## 2019-07-11 PROCEDURE — 92134 CPTRZ OPH DX IMG PST SGM RTA: CPT | Mod: ZF | Performed by: OPHTHALMOLOGY

## 2019-07-11 ASSESSMENT — REFRACTION_WEARINGRX
OD_AXIS: 093
OD_CYLINDER: +2.25
OD_ADD: +2.75
OS_AXIS: 134
OD_SPHERE: -4.75
OS_ADD: +2.75
OS_SPHERE: -3.50
SPECS_TYPE: BIFOCAL
OS_CYLINDER: +2.00

## 2019-07-11 ASSESSMENT — VISUAL ACUITY
OD_CC: 20/60
OS_CC: 20/40
METHOD: SNELLEN - LINEAR
OD_CC+: -1
CORRECTION_TYPE: GLASSES

## 2019-07-11 ASSESSMENT — TONOMETRY
IOP_METHOD: TONOPEN
OD_IOP_MMHG: 10
OS_IOP_MMHG: 11

## 2019-07-11 ASSESSMENT — REFRACTION_MANIFEST
OD_AXIS: 090
OD_CYLINDER: +1.50
OD_ADD: +2.75
OS_AXIS: 135
OS_ADD: +2.75
OD_SPHERE: -5.00
OS_CYLINDER: +1.75
OS_SPHERE: -3.50

## 2019-07-11 ASSESSMENT — SLIT LAMP EXAM - LIDS
COMMENTS: NORMAL
COMMENTS: NORMAL

## 2019-07-11 ASSESSMENT — CONF VISUAL FIELD
OS_NORMAL: 1
OD_NORMAL: 1
METHOD: COUNTING FINGERS

## 2019-07-11 ASSESSMENT — CUP TO DISC RATIO
OS_RATIO: 0.3
OD_RATIO: 0.3

## 2019-07-11 NOTE — PROGRESS NOTES
CC: retina hemes  HPI: Supriya Herr is a  69 year old year-old patient referred by Dr Baeza for evaluation and treat of DMII with mild nonproliferative diabetic retinopathy and cataract.     Interval history:  Vision seems stable to slightly worsened. Reports blood sugar control good in low 100s. Pt complains of B/L eye dryness/irritation.    PMH:  History of Diabetes mellitus on insulin. HbA1c 6.0 4.19  Past ocular history: History of cataract surgery left eye.   history of Macular hole left eye. Status post Pars plana vitrectomy, membrane peel, air-fluid exchange, SF6 gas infusion, left eye 2/14/2012 by Dr. Daniel     Retinal Imaging:  OCT 7-11-19  RE: few foveal cystic changes with/ worsened IRF; retina thinning and partial thickness Macula hole.  -> 308  LE:  Retina irregularity, trace sup Epiretinal membrane; retina thinning. Tiny cystic edema.  -> 271    optos pic 04/11/19 consistent with exam    fluorescein angiography: 04/11/19   Right eye: blockage of fluorescein angiography on the areas of heme; few microaneurysms; mild late Diabetic macular edema and PP leakage  No neovascularization elsewhere; no neovascularization of the disc.  Left eye: few microaneurysms; mild late Diabetic macular edema; staining of the peripheral Chorioretinal  scars    Assessment & Plan:  1. Moderate nonproliferative diabetic retinopathy and mild Diabetic macular edema both eyes  Worsened edema in right eye, stable in left eye   patient could benefit for focal laser vs anti VEGF inj  Discussed right eye avastin inj vs observation, discussed with patient, she would rather observe.   Blood pressure (<120/80) and blood glucose (HbA1c <7.0) control discussed with patient. Patient advised that failure to adequately control each may lead to vision loss. The patient expressed understanding.    2. Mod Hypertensive retinopathy   Stage 5 kidney disease  Considering HD   blood pressure control has been poor in  160s/90s-100s  Likely contributing to macular edema    3. Cataract right eye   Becoming visually significant   intraocular lens calcs today  best corrected visual acuity 20/50 right eye   Plan for Cataract extraction intraocular lens placement in next few months - will not schedule yet    4. Pseudophakia left eye  best corrected visual acuity 20/30 left eye   Observe    5. History of Macula hole repair left eye by Dr. Daniel  Based on care everywhere records   Macula hole closed  Observe    6. Dry eye syndrome   Recommend punctal plugs today  artificial tears  As needed and warm compresses      Plan: return to clinic 4 weeks for Optical Coherence Tomography Mac both eyes, possible IVTA OD    Sooner as needed     Fernando Simmons MD PGY3 Ophthalmology    ~~~~~~~~~~~~~~~~~~~~~~~~~~~~~~~~~~   Complete documentation of historical and exam elements from today's encounter can be found in the full encounter summary report (not reduplicated in this progress note).  I personally obtained the chief complaint(s) and history of present illness.  I confirmed and edited as necessary the review of systems, past medical/surgical history, family history, social history, and examination findings as documented by others; and I examined the patient myself.  I personally reviewed the relevant tests, images, and reports as documented above.  I formulated and edited as necessary the assessment and plan and discussed the findings and management plan with the patient and family and I was present for the entire procedure performed by the resident/fellow.    Leanne Jett MD   of Ophthalmology.  Retina Service   Department of Ophthalmology and Visual Neurosciences   [facility]  Phone: [phone]   Fax: [phone]

## 2019-07-11 NOTE — NURSING NOTE
Chief Complaints and History of Present Illnesses   Patient presents with     Diabetic Retinopathy Follow Up     Chief Complaint(s) and History of Present Illness(es)     Diabetic Retinopathy Follow Up     Laterality: both eyes    Onset: gradual    Onset: months ago    Quality: States va is the same since last visit      Frequency: constantly    Associated symptoms: tearing (+itchy)    Pain scale: 0/10              Comments     BS 98  Lab Results       Component                Value               Date                       A1C                      6.0                 06/22/2018                 A1C                      5.4                 03/29/2018                 A1C                      6.8                 01/09/2018                 A1C                      9.6                 06/09/2017                 A1C                      6.9                 02/14/2017            Sara Navarro COT 12:11 PM July 11, 2019

## 2019-07-18 ENCOUNTER — TELEPHONE (OUTPATIENT)
Dept: FAMILY MEDICINE | Facility: CLINIC | Age: 69
End: 2019-07-18

## 2019-07-18 NOTE — TELEPHONE ENCOUNTER
[employer] [facility] called they have some questions about some equipment that DR. Jackson ordered for the patient.

## 2019-07-22 NOTE — TELEPHONE ENCOUNTER
Dr. Jackson--    Spoke with [employer] about order for mattress overlay that you signed on 06/11/19.     They need OV notes from 04/08/19 addended with a statement that the patient needs the overlay along with the bolded pt conditions below that we supplied to them in a recent fax that they sent. Also she stated that each page needs to be signed or initialed. They state that if not appropriate to add/addend, then pt needs a new face to face.     -compromised circulation status  -completely immobile [pt cannot make changes in body position without assistance]  -limited mobility  -pressure ulcer on trunk or pelvis    Call back number for [employer] (Joseph) [phone]. Please notify them if addended notes are faxed or if pt needing face to face.     Ana Luisa Garcia RN  [facility]

## 2019-07-22 NOTE — TELEPHONE ENCOUNTER
Pt's daughter, Caroline called and was notified of provider response/issues with getting paid claim through medicare for the mattress. She verbalized understanding and will look into cost out of pocket to see if they might just want to purchase without going through insurance.    Ana Luisa Garcia RN  [facility]

## 2019-07-22 NOTE — TELEPHONE ENCOUNTER
Copied message from provider: Need face to face, I can not ammend notes.    Called pt's daughter Caroline Gray. LVM to call clinic to notify her of situation. Called Joseph with [employer] and left detailed VM with provider's response.    Ana Luisa Garcia RN  [facility]

## 2019-08-02 ENCOUNTER — OFFICE VISIT (OUTPATIENT)
Dept: ENDOCRINOLOGY | Facility: CLINIC | Age: 69
End: 2019-08-02
Payer: MEDICARE

## 2019-08-02 VITALS — DIASTOLIC BLOOD PRESSURE: 70 MMHG | SYSTOLIC BLOOD PRESSURE: 157 MMHG | HEART RATE: 61 BPM

## 2019-08-02 DIAGNOSIS — E11.40 TYPE 2 DIABETES MELLITUS WITH DIABETIC NEUROPATHY, WITH LONG-TERM CURRENT USE OF INSULIN (H): Primary | ICD-10-CM

## 2019-08-02 DIAGNOSIS — Z79.4 TYPE 2 DIABETES MELLITUS WITH DIABETIC NEUROPATHY, WITH LONG-TERM CURRENT USE OF INSULIN (H): Primary | ICD-10-CM

## 2019-08-02 LAB — HBA1C MFR BLD: 5.5 % (ref 4.3–6)
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
## 2019-08-02 NOTE — NURSING NOTE
Chief Complaint   Patient presents with     RECHECK     Type 2 Diabetes     Capillary puncture performed for Hemoglobin A1C test. Patient tolerated well.    Karlene Estes MA

## 2019-08-05 ENCOUNTER — TELEPHONE (OUTPATIENT)
Dept: NEPHROLOGY | Facility: CLINIC | Age: 69
End: 2019-08-05

## 2019-08-06 DIAGNOSIS — E11.3213 TYPE 2 DIABETES MELLITUS WITH MILD NONPROLIFERATIVE RETINOPATHY OF BOTH EYES AND MACULAR EDEMA, UNSPECIFIED WHETHER LONG TERM INSULIN USE (H): Primary | ICD-10-CM

## 2019-08-07 ENCOUNTER — OFFICE VISIT (OUTPATIENT)
Dept: NEPHROLOGY | Facility: CLINIC | Age: 69
End: 2019-08-07
Payer: MEDICARE

## 2019-08-07 VITALS
TEMPERATURE: 98.4 F | SYSTOLIC BLOOD PRESSURE: 153 MMHG | OXYGEN SATURATION: 97 % | DIASTOLIC BLOOD PRESSURE: 69 MMHG | HEART RATE: 67 BPM

## 2019-08-07 DIAGNOSIS — N18.5 ANEMIA DUE TO STAGE 5 CHRONIC KIDNEY DISEASE, NOT ON CHRONIC DIALYSIS (H): ICD-10-CM

## 2019-08-07 DIAGNOSIS — D63.1 ANEMIA DUE TO STAGE 5 CHRONIC KIDNEY DISEASE, NOT ON CHRONIC DIALYSIS (H): ICD-10-CM

## 2019-08-07 DIAGNOSIS — N18.5 CKD (CHRONIC KIDNEY DISEASE) STAGE 5, GFR LESS THAN 15 ML/MIN (H): ICD-10-CM

## 2019-08-07 DIAGNOSIS — N18.5 CKD (CHRONIC KIDNEY DISEASE) STAGE 5, GFR LESS THAN 15 ML/MIN (H): Primary | ICD-10-CM

## 2019-08-07 DIAGNOSIS — E11.21 TYPE 2 DIABETES MELLITUS WITH DIABETIC NEPHROPATHY, WITH LONG-TERM CURRENT USE OF INSULIN (H): ICD-10-CM

## 2019-08-07 DIAGNOSIS — I10 ESSENTIAL HYPERTENSION: ICD-10-CM

## 2019-08-07 DIAGNOSIS — Z79.4 TYPE 2 DIABETES MELLITUS WITH DIABETIC NEPHROPATHY, WITH LONG-TERM CURRENT USE OF INSULIN (H): ICD-10-CM

## 2019-08-07 DIAGNOSIS — E55.9 VITAMIN D DEFICIENCY: ICD-10-CM

## 2019-08-07 LAB
ALBUMIN SERPL-MCNC: 2.9 G/DL (ref 3.4–5)
ANION GAP SERPL CALCULATED.3IONS-SCNC: 6 MMOL/L (ref 3–14)
BUN SERPL-MCNC: 92 MG/DL (ref 7–30)
CALCIUM SERPL-MCNC: 8.2 MG/DL (ref 8.5–10.1)
CHLORIDE SERPL-SCNC: 112 MMOL/L (ref 94–109)
CO2 SERPL-SCNC: 22 MMOL/L (ref 20–32)
CREAT SERPL-MCNC: 4.85 MG/DL (ref 0.52–1.04)
FERRITIN SERPL-MCNC: 543 NG/ML (ref 8–252)
GFR SERPL CREATININE-BSD FRML MDRD: 8 ML/MIN/{1.73_M2}
GLUCOSE SERPL-MCNC: 243 MG/DL (ref 70–99)
HGB BLD-MCNC: 9.3 G/DL (ref 11.7–15.7)
IRON SATN MFR SERPL: 27 % (ref 15–46)
IRON SERPL-MCNC: 61 UG/DL (ref 35–180)
PHOSPHATE SERPL-MCNC: 5.4 MG/DL (ref 2.5–4.5)
POTASSIUM SERPL-SCNC: 4.4 MMOL/L (ref 3.4–5.3)
SODIUM SERPL-SCNC: 140 MMOL/L (ref 133–144)
TIBC SERPL-MCNC: 226 UG/DL (ref 240–430)

## 2019-08-07 PROCEDURE — 82728 ASSAY OF FERRITIN: CPT

## 2019-08-07 PROCEDURE — 80069 RENAL FUNCTION PANEL: CPT

## 2019-08-07 PROCEDURE — 85018 HEMOGLOBIN: CPT

## 2019-08-07 PROCEDURE — 83540 ASSAY OF IRON: CPT

## 2019-08-07 PROCEDURE — G0463 HOSPITAL OUTPT CLINIC VISIT: HCPCS | Mod: ZF

## 2019-08-07 PROCEDURE — 36415 COLL VENOUS BLD VENIPUNCTURE: CPT

## 2019-08-07 PROCEDURE — 83550 IRON BINDING TEST: CPT

## 2019-08-07 RX ORDER — CARVEDILOL 25 MG/1
25 TABLET ORAL 2 TIMES DAILY WITH MEALS
Qty: 180 TABLET | Refills: 3 | Status: SHIPPED | OUTPATIENT
Start: 2019-08-07 | End: 2020-08-04

## 2019-08-07 ASSESSMENT — PAIN SCALES - GENERAL: PAINLEVEL: MILD PAIN (2)

## 2019-08-07 NOTE — NURSING NOTE
"Chief Complaint   Patient presents with     RECHECK     Follow up CKD     Vital signs:  Temp: 98.4  F (36.9  C)   BP: (!) 153/69 Pulse: 67     SpO2: 97 %          Estimated body mass index is 31.11 kg/m  as calculated from the following:    Height as of 7/9/19: 1.676 m (5' 5.98\").    Weight as of 5/24/19: 87.4 kg (192 lb 9.6 oz).      Bety Abreu CMA    "

## 2019-08-07 NOTE — PATIENT INSTRUCTIONS
1. Increase Coreg to 25 mg twice per day  2. Blood pressure goal is 120-140/  3. Anticipate a decline in pulse rate to the low 60's or upper 50's. If pulse declines into the mid 50 range let me know.

## 2019-08-07 NOTE — LETTER
"8/7/2019      RE: Supriya Herr  [address]  [facility] [id number]       Nephrology Clinic Visit 8/7/19    Assessment and Plan:     1. CKD5 w/proteinuria- Renal function stable this month. Creat 4.8, eGFR 8 ml/mn, UPCR 6.28 g/gCr. No uremic symptoms   - Etiology of CKD felt to be DM, HTN, Vascular dz,    - Inactive on transplant wait list as of 4/18 due to being \"too sick\".    - Has been seen by Dr Martin and will need AVG for HD.    - Patient and daughter more accepting of Supriya starting HD. They are happy that they will be able to make their annual mother daughter trip prior to starting HD.    - We have decided that if eGFR is ~ 5 ml/mn, or if feeling unwell, will begin dialysis unit search and schedule her access placement in preparation for HD initiation. She is hoping to be able to find an HD unit where she could run later in the day so she can continue at the day program.    - No need to initiate at this time, no uremic symptoms, lytes, bicarb fine   - Does not use NSAIDs   - Blood pressures still not consistently controlled   - DM with excellent control. A1c 5.5 % 7/19   - On statin for CV risk reduction     2. Volume status - Euvolemic. Has mild edema. Blood pressures  remain uncontrolled. No current weight given amputation.   Remains on Lasix 60 mg am, 40 mg pm     3. HTN -  Uncontrolled. Clinic blood pressures 150's/ x 3 and Day program blood pressures in the 150-160/ range, but no way to evaluate device.   Current regimen:    - lasix 60/40   - Coreg 12.5 mg bid   - Amlodipine 5 mg bid       -  Will increase Coreg to 25 mg bid.     - Continue home monitoring   - Blood pressure goal < 130/80     4. Electrolytes - No acute concerns. K 4.4, Na 140     5. Acid base - No acute concerns. Bicarb 22     6. BMD - Ca corrected 9.0, Phos 5.4, Albumin 2.9   - Vit D 27,  (1/19)   - Continue Vit D 2000 U every day, but will hold Calcitriol given recent hypercalcemia   - Patient will work on " dietary modification of phos, however if Phos ~ 6.0 next visit will start Renvela.      7. Anemia - Hgb 9.3   - Iron studies : Ferritin 673, Fe 55, IS 23.    - Has not required KELLIE given baseline hgb ~ 10   - Colonoscopy 2018, tubular adenomas     8. DM2 - Well controlled. A1c 5.5 %. On Insulin      9. Depression - Controlled with increase in Zoloft to 50 mg every day.       10. Disposition - RTC 1 month for f/u     Assessment and plan was discussed with patient and she voiced her understanding and agreement.     Reason for Visit:  CKD5/HTN f/u     HPI:  Ms Herr is a 68 yo female with CKD5 and nephrotic range proteinuria, DM2, HTN, in today for routine CKD f/u. Last seen by me on 19. Decision was made to have access placed and begin search for HD unit if GFR was 5.       Interval Hx:   No hospital admissions     ROS:   Quite nervous about this visit today. Concerned it will be time to start HD.   Blood pressures at day program remain in the 150-160/ range  No acute concerns.   Ashlie have planned a mother/daughter trip next week for one week. Going to [place] and several other locals.   Has good appetite. Glycemic control is great. Denies dyspnea, chest pain, abdominal pain. No bowel or bladder concerns. Decided to not get a new prosthesis.      Chronic Health Problems:     CKD5  Proteinuria  AKA, left (10/18)  T2DM  Vit D def  HTN  HLD  Anxiety/depression  Vitiligo  Non proliferative diabetic retinopathy  BCC  JONE  Anemia  Prior tobacco abuse     Personal Hx:   Lives in lower level of [facility], daughter Caroline upper level. [place]. Attends day program at [facility] Monday through Thursday    Family Hx:   Family History   Problem Relation Age of Onset     Diabetes Mother      Hypertension Mother      Heart Disease Mother          of congestive heart failure     Eye Disorder Mother      Arthritis Mother      Obesity Mother      Heart Disease Father          from CHF     Cerebrovascular Disease  "Father      Arthritis Father      Musculoskeletal Disorder Other         has MS     Thyroid Disease Other      Eye Disorder Other         cataracts     Cancer Other         throat/liver     Skin Cancer No family hx of      Melanoma No family hx of      Glaucoma No family hx of      Macular Degeneration No family hx of          Allergies:  Allergies   Allergen Reactions     Penicillins Rash     Unasyn Rash     No evidence SJS, but very uncomfortable and precipitated multiple provider visits. Would not use penicillins again if other options available.        Medications:  Current Outpatient Medications   Medication Sig     acetaminophen (TYLENOL) 325 MG tablet Take 2 tablets (650 mg) by mouth every 4 hours as needed for mild pain     albuterol (PROAIR HFA, PROVENTIL HFA, VENTOLIN HFA) 108 (90 BASE) MCG/ACT inhaler Inhale 2 puffs into the lungs every 6 hours as needed for shortness of breath / dyspnea or wheezing     amLODIPine (NORVASC) 5 MG tablet Take 1 tablet (5 mg) by mouth 2 times daily     atorvastatin (LIPITOR) 20 MG tablet TAKE 1 TABLET BY MOUTH ONCE DAILY     blood glucose (ONETOUCH ULTRA) test strip TEST YOUR BLOOD SUGAR 3-4 TIMES PER DAY.     carvedilol (COREG) 25 MG tablet Take 1 tablet (25 mg) by mouth 2 times daily (with meals)     clindamycin (CLINDAMAX) 1 % topical gel Apply topically 2 times daily     furosemide (LASIX) 40 MG tablet Take 1 tablet (40 mg) by mouth every evening Taking 60 mg AM/40 mg PM     insulin aspart (NOVOLOG FLEXPEN) 100 UNIT/ML pen Inject 4 units SQ with breakfast, lunch and dinner.     insulin glargine (BASAGLAR KWIKPEN) 100 UNIT/ML pen Inject 22 Units Subcutaneous At Bedtime Inject 22 U SubCut at HS     insulin pen needle (ULTICARE MINI) 31G X 6 MM Use daily or as directed.     order for DME Equipment being ordered: mattress overlay for hospital bed  Wt. 192#  Height 5'5\"  99 months/Lifetime     order for DME Compression socks - knee  15-20 mmHg  Open toed.  Use daily.     " order for DME Equipment being ordered: Mattress Overlay for Hospital Bed. Height 65. Weight 168 lbs.  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     order for DME 1 wheelchair     order for DME Equipment being ordered: TEDS stocking   Below the knee 15-20 mg  Dispense 2  Use daily     ORDER FOR DME Equipment being ordered: Compression stockings, 20-30 MMHG, knee high     sertraline (ZOLOFT) 50 MG tablet Take 1 tablet (50 mg) by mouth daily     triamcinolone (KENALOG) 0.1 % external cream Apply to sites of dermatitis- the abdomen, armpits, groin as needed.     vitamin D3 (CHOLECALCIFEROL) 2000 units (50 mcg) tablet Take 1 tablet (2,000 Units) by mouth daily     No current facility-administered medications for this visit.       Vitals:  B/P 150-153/67-69  P 67  No weight    Exam:  GEN: Pleasant female in NAD. Daughter present.  CARDIAC: RRR  LUNGS: CTA  ABDOMEN: Soft NT  EXT: Left LORI, Right 1+ edema  NEURO: alert. Oriented. No asterixis    LABS:   CMP  Recent Labs   Lab Test 08/07/19  1504 07/09/19  1513 05/24/19  1410 04/24/19  1514    142 144 142   POTASSIUM 4.4 4.7 4.7 4.1   CHLORIDE 112* 111* 113* 110*   CO2 22 23 23 23   ANIONGAP 6 8 8 9   * 121* 66* 176*   BUN 92* 93* 98* 98*   CR 4.85* 5.52* 4.15* 4.52*   GFRESTIMATED 8* 7* 10* 9*   GFRESTBLACK 10* 8* 12* 11*   JODY 8.2* 8.4* 9.8 8.7     Recent Labs   Lab Test 07/31/18  1148 07/24/18  0900 07/17/18  0830 06/24/18  0623 06/23/18  2347 03/29/18  1410 11/13/17  0715   BILITOTAL  --   --   --  0.2 0.2 0.2 0.3   ALKPHOS  --   --   --  49 56 56 70   ALT  --   --   --  17 13 15 37   AST 24 24 12 16 12 14 30     CBC  Recent Labs   Lab Test  08/07/19  1504 07/09/19  1513 04/24/19  1514 02/15/19  1459 01/18/19  1457 12/07/18  1411   HGB 9.3* 9.3* 8.2* 10.3* 10.2* 10.3*   WBC  --  6.3  --  5.5 5.4 5.7   RBC  --  3.07*  --  3.40* 3.47* 3.47*   HCT  --  29.7*  --  31.6* 32.4* 32.3*   MCV  --  97  --  93 93 93   MCH  --  30.3  --  30.3 29.4 29.7   MCHC  --  31.3*  --  32.6 31.5 31.9   RDW  --  12.9  --  12.5 12.4 12.4   PLT  --  202  --  232 222 237     URINE STUDIES  Recent Labs   Lab Test 03/29/18  1448 01/25/18  0233 11/02/17  0602 10/26/16  1220  12/05/12  1541   COLOR Straw Light Yellow Light Yellow Yellow   < > Yellow   APPEARANCE Clear Clear Clear Slightly Cloudy   < > Clear   URINEGLC 50* Negative 300* >500*   < > 100*   URINEBILI Negative Negative Negative Negative   < > Negative   URINEKETONE Negative Negative Negative Negative   < > Negative   SG 1.008 1.007 1.010 1.014   < > 1.025   UBLD Negative Negative Negative Negative   < > Small*   URINEPH 5.0 6.5 7.5* 6.0   < > 6.0   PROTEIN >499* 100* >600* >500*   < > 100*   UROBILINOGEN  --   --   --   --   --  0.2   NITRITE Negative Negative Negative Negative   < > Negative   LEUKEST Negative Negative Small* Small*   < > Negative   RBCU 1 0 1 5*   < >  --    WBCU 2 1 44* 54*   < >  --     < > = values in this interval not displayed.     Recent Labs   Lab Test 05/24/19  1420 12/07/18  1417 11/01/18  1533 05/24/18  1441   UTPG 6.28* 5.60* 6.71* 5.97*     PTH  Recent Labs   Lab Test 01/18/19  1457 10/17/18  1413 05/24/18  1435   PTHI 126* 127* 55     IRON STUDIES  Recent Labs   Lab Test 08/07/19  1504 07/09/19  1513 04/24/19  1514   IRON 61 55 34*   * 242 227*   IRONSAT 27 23 15   ELENA 543* 673* 298*       Silva Guerrero, APRN, CNP

## 2019-08-07 NOTE — PROGRESS NOTES
"Nephrology Clinic Visit 8/7/19    Assessment and Plan:     1. CKD5 w/proteinuria- Renal function stable this month. Creat 4.8, eGFR 8 ml/mn, UPCR 6.28 g/gCr. No uremic symptoms   - Etiology of CKD felt to be DM, HTN, Vascular dz,    - Inactive on transplant wait list as of 4/18 due to being \"too sick\".    - Has been seen by Dr Martin and will need AVG for HD.    - Patient and daughter more accepting of Supriya starting HD. They are happy that they will be able to make their annual mother daughter trip prior to starting HD.    - We have decided that if eGFR is ~ 5 ml/mn, or if feeling unwell, will begin dialysis unit search and schedule her access placement in preparation for HD initiation. She is hoping to be able to find an HD unit where she could run later in the day so she can continue at the day program.    - No need to initiate at this time, no uremic symptoms, lytes, bicarb fine   - Does not use NSAIDs   - Blood pressures still not consistently controlled   - DM with excellent control. A1c 5.5 % 7/19   - On statin for CV risk reduction     2. Volume status - Euvolemic. Has mild edema. Blood pressures remain uncontrolled. No current weight given amputation.   Remains on Lasix 60 mg am, 40 mg pm     3. HTN - Uncontrolled. Clinic blood pressures 150's/ x 3 and Day program blood pressures in the 150-160/ range, but no way to evaluate device.   Current regimen:    - lasix 60/40   - Coreg 12.5 mg bid   - Amlodipine 5 mg bid       - Will increase Coreg to 25 mg bid.     - Continue home monitoring   - Blood pressure goal < 130/80     4. Electrolytes - No acute concerns. K 4.4, Na 140     5. Acid base - No acute concerns. Bicarb 22     6. BMD - Ca corrected 9.0, Phos 5.4, Albumin 2.9   - Vit D 27,  (1/19)   - Continue Vit D 2000 U every day, but will hold Calcitriol given recent hypercalcemia   - Patient will work on dietary modification of phos, however if Phos ~ 6.0 next visit will start Renvela.      7. " Anemia - Hgb 9.3   - Iron studies : Ferritin 673, Fe 55, IS 23.    - Has not required KELLIE given baseline hgb ~ 10   - Colonoscopy 2018, tubular adenomas     8. DM2 - Well controlled. A1c 5.5 %. On Insulin      9. Depression - Controlled with increase in Zoloft to 50 mg every day.       10. Disposition - RTC 1 month for f/u     Assessment and plan was discussed with patient and she voiced her understanding and agreement.     Reason for Visit:  CKD5/HTN f/u     HPI:  Ms Herr is a 70 yo female with CKD5 and nephrotic range proteinuria, DM2, HTN, in today for routine CKD f/u. Last seen by me on 19. Decision was made to have access placed and begin search for HD unit if GFR was 5.       Interval Hx:   No hospital admissions     ROS:   Quite nervous about this visit today. Concerned it will be time to start HD.   Blood pressures at day program remain in the 150-160/ range  No acute concerns.   Caroline and Supriya have planned a mother/daughter trip next week for one week. Going to [place] and several other locals.   Has good appetite. Glycemic control is great. Denies dyspnea, chest pain, abdominal pain. No bowel or bladder concerns. Decided to not get a new prosthesis.      Chronic Health Problems:     CKD5  Proteinuria  AKA, left (10/18)  T2DM  Vit D def  HTN  HLD  Anxiety/depression  Vitiligo  Non proliferative diabetic retinopathy  BCC  JONE  Anemia  Prior tobacco abuse     Personal Hx:   Lives in lower level of [facility], daughter Caroline upper level. NS. Attends day program at [facility] Monday through Thursday    Family Hx:   Family History   Problem Relation Age of Onset     Diabetes Mother      Hypertension Mother      Heart Disease Mother          of congestive heart failure     Eye Disorder Mother      Arthritis Mother      Obesity Mother      Heart Disease Father          from CHF     Cerebrovascular Disease Father      Arthritis Father      Musculoskeletal Disorder Other         has MS     Thyroid  "Disease Other      Eye Disorder Other         cataracts     Cancer Other         throat/liver     Skin Cancer No family hx of      Melanoma No family hx of      Glaucoma No family hx of      Macular Degeneration No family hx of          Allergies:  Allergies   Allergen Reactions     Penicillins Rash     Unasyn Rash     No evidence SJS, but very uncomfortable and precipitated multiple provider visits. Would not use penicillins again if other options available.        Medications:  Current Outpatient Medications   Medication Sig     acetaminophen (TYLENOL) 325 MG tablet Take 2 tablets (650 mg) by mouth every 4 hours as needed for mild pain     albuterol (PROAIR HFA, PROVENTIL HFA, VENTOLIN HFA) 108 (90 BASE) MCG/ACT inhaler Inhale 2 puffs into the lungs every 6 hours as needed for shortness of breath / dyspnea or wheezing     amLODIPine (NORVASC) 5 MG tablet Take 1 tablet (5 mg) by mouth 2 times daily     atorvastatin (LIPITOR) 20 MG tablet TAKE 1 TABLET BY MOUTH ONCE DAILY     blood glucose (ONETOUCH ULTRA) test strip TEST YOUR BLOOD SUGAR 3-4 TIMES PER DAY.     carvedilol (COREG) 25 MG tablet Take 1 tablet (25 mg) by mouth 2 times daily (with meals)     clindamycin (CLINDAMAX) 1 % topical gel Apply topically 2 times daily     furosemide (LASIX) 40 MG tablet Take 1 tablet (40 mg) by mouth every evening Taking 60 mg AM/40 mg PM     insulin aspart (NOVOLOG FLEXPEN) 100 UNIT/ML pen Inject 4 units SQ with breakfast, lunch and dinner.     insulin glargine (BASAGLAR KWIKPEN) 100 UNIT/ML pen Inject 22 Units Subcutaneous At Bedtime Inject 22 U SubCut at HS     insulin pen needle (ULTICARE MINI) 31G X 6 MM Use daily or as directed.     order for DME Equipment being ordered: mattress overlay for hospital bed  Wt. 192#  Height 5'5\"  99 months/Lifetime     order for DME Compression socks - knee  15-20 mmHg  Open toed.  Use daily.     order for DME Equipment being ordered: Mattress Overlay for Hospital Bed. Height 65. Weight 168 " lbs.  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     order for DME 1 wheelchair     order for DME Equipment being ordered: TEDS stocking   Below the knee 15-20 mg  Dispense 2  Use daily     ORDER FOR DME Equipment being ordered: Compression stockings, 20-30 MMHG, knee high     sertraline (ZOLOFT) 50 MG tablet Take 1 tablet (50 mg) by mouth daily     triamcinolone (KENALOG) 0.1 % external cream Apply to sites of dermatitis- the abdomen, armpits, groin as needed.     vitamin D3 (CHOLECALCIFEROL) 2000 units (50 mcg) tablet Take 1 tablet (2,000 Units) by mouth daily     No current facility-administered medications for this visit.       Vitals:  B/P 150-153/67-69  P 67  No weight    Exam:  GEN: Pleasant female in NAD. Daughter present.  CARDIAC: RRR  LUNGS: CTA  ABDOMEN: Soft NT  EXT: Left LORI, Right 1+ edema  NEURO: alert. Oriented. No asterixis    LABS:   CMP  Recent Labs   Lab Test 08/07/19  1504 07/09/19  1513 05/24/19  1410 04/24/19  1514    142 144 142   POTASSIUM 4.4 4.7 4.7 4.1   CHLORIDE 112* 111* 113* 110*   CO2 22 23 23 23   ANIONGAP 6 8 8 9   * 121* 66* 176*   BUN 92* 93* 98* 98*   CR 4.85* 5.52* 4.15* 4.52*   GFRESTIMATED 8* 7* 10* 9*   GFRESTBLACK 10* 8* 12* 11*   JODY 8.2* 8.4* 9.8 8.7     Recent Labs   Lab Test 07/31/18  1148 07/24/18  0900 07/17/18  0830 06/24/18  0623 06/23/18  2347 03/29/18  1410 11/13/17  0715   BILITOTAL  --   --   --  0.2 0.2 0.2 0.3   ALKPHOS  --   --   --  49 56 56 70   ALT  --   --   --  17 13 15 37   AST 24 24 12 16 12 14 30     CBC  Recent Labs   Lab Test 08/07/19  1504 07/09/19  1513 04/24/19  1514 02/15/19  1459 01/18/19  1457 12/07/18  1411   HGB 9.3*  9.3* 8.2* 10.3* 10.2* 10.3*   WBC  --  6.3  --  5.5 5.4 5.7   RBC  --  3.07*  --  3.40* 3.47* 3.47*   HCT  --  29.7*  --  31.6* 32.4* 32.3*   MCV  --  97  --  93 93 93   MCH  --  30.3  --  30.3 29.4 29.7   MCHC  --  31.3*  --  32.6 31.5 31.9   RDW  --  12.9  --  12.5 12.4 12.4   PLT  --  202  --  232 222 237     URINE STUDIES  Recent Labs   Lab Test 03/29/18  1448 01/25/18  0233 11/02/17  0602 10/26/16  1220  12/05/12  1541   COLOR Straw Light Yellow Light Yellow Yellow   < > Yellow   APPEARANCE Clear Clear Clear Slightly Cloudy   < > Clear   URINEGLC 50* Negative 300* >500*   < > 100*   URINEBILI Negative Negative Negative Negative   < > Negative   URINEKETONE Negative Negative Negative Negative   < > Negative   SG 1.008 1.007 1.010 1.014   < > 1.025   UBLD Negative Negative Negative Negative   < > Small*   URINEPH 5.0 6.5 7.5* 6.0   < > 6.0   PROTEIN >499* 100* >600* >500*   < > 100*   UROBILINOGEN  --   --   --   --   --  0.2   NITRITE Negative Negative Negative Negative   < > Negative   LEUKEST Negative Negative Small* Small*   < > Negative   RBCU 1 0 1 5*   < >  --    WBCU 2 1 44* 54*   < >  --     < > = values in this interval not displayed.     Recent Labs   Lab Test 05/24/19  1420 12/07/18  1417 11/01/18  1533 05/24/18  1441   UTPG 6.28* 5.60* 6.71* 5.97*     PTH  Recent Labs   Lab Test 01/18/19  1457 10/17/18  1413 05/24/18  1435   PTHI 126* 127* 55     IRON STUDIES  Recent Labs   Lab Test 08/07/19  1504 07/09/19  1513 04/24/19  1514   IRON 61 55 34*   * 242 227*   IRONSAT 27 23 15   ELENA 543* 673* 298*       Silva Guerrero, APRN, CNP

## 2019-08-14 ENCOUNTER — TELEPHONE (OUTPATIENT)
Dept: TRANSPLANT | Facility: CLINIC | Age: 69
End: 2019-08-14

## 2019-08-14 DIAGNOSIS — Z76.82 ORGAN TRANSPLANT CANDIDATE: ICD-10-CM

## 2019-08-14 DIAGNOSIS — N18.6 ESRD (END STAGE RENAL DISEASE) (H): ICD-10-CM

## 2019-08-14 DIAGNOSIS — Z01.810 ENCOUNTER FOR PRE-OPERATIVE CARDIOVASCULAR CLEARANCE: Primary | ICD-10-CM

## 2019-08-14 NOTE — TELEPHONE ENCOUNTER
Coordinator called and left msg with daughter. Coordinator attempted to call pt but her mailbox is full and was unable to leave a msg. Explained orders will be placed for return waitlist appointments for active status consideration. Scheduling should be contacting soon to arrange. Contact information provided for any questions.    Items needed:  1. Pulmonary (already scheduled)  2. Mammo  3. Neph  4. SW  5. Updated HLA  6. Cards and María (due in Nov)  7. Transplant surgeon   8. Potential imaging (inbkt msg sent to surgical NP to verify)

## 2019-08-15 ENCOUNTER — OFFICE VISIT (OUTPATIENT)
Dept: OPHTHALMOLOGY | Facility: CLINIC | Age: 69
End: 2019-08-15
Attending: OPHTHALMOLOGY
Payer: MEDICARE

## 2019-08-15 ENCOUNTER — TELEPHONE (OUTPATIENT)
Dept: TRANSPLANT | Facility: CLINIC | Age: 69
End: 2019-08-15

## 2019-08-15 DIAGNOSIS — H04.123 DRY EYE SYNDROME OF BOTH EYES: ICD-10-CM

## 2019-08-15 DIAGNOSIS — E11.3393 MODERATE NONPROLIFERATIVE DIABETIC RETINOPATHY OF BOTH EYES WITHOUT MACULAR EDEMA ASSOCIATED WITH TYPE 2 DIABETES MELLITUS (H): ICD-10-CM

## 2019-08-15 DIAGNOSIS — H25.811 COMBINED FORMS OF AGE-RELATED CATARACT OF RIGHT EYE: ICD-10-CM

## 2019-08-15 DIAGNOSIS — Z96.1 PSEUDOPHAKIA OF LEFT EYE: ICD-10-CM

## 2019-08-15 DIAGNOSIS — E11.3213 TYPE 2 DIABETES MELLITUS WITH MILD NONPROLIFERATIVE RETINOPATHY OF BOTH EYES AND MACULAR EDEMA, UNSPECIFIED WHETHER LONG TERM INSULIN USE (H): Primary | ICD-10-CM

## 2019-08-15 PROCEDURE — 67028 INJECTION EYE DRUG: CPT | Mod: RT,ZF | Performed by: OPHTHALMOLOGY

## 2019-08-15 PROCEDURE — G0463 HOSPITAL OUTPT CLINIC VISIT: HCPCS | Mod: ZF

## 2019-08-15 PROCEDURE — 92134 CPTRZ OPH DX IMG PST SGM RTA: CPT | Mod: ZF | Performed by: OPHTHALMOLOGY

## 2019-08-15 PROCEDURE — 25000128 H RX IP 250 OP 636: Mod: ZF | Performed by: OPHTHALMOLOGY

## 2019-08-15 PROCEDURE — C9257 BEVACIZUMAB INJECTION: HCPCS | Mod: ZF | Performed by: OPHTHALMOLOGY

## 2019-08-15 RX ADMIN — Medication 1.25 MG: at 17:26

## 2019-08-15 ASSESSMENT — VISUAL ACUITY
OS_CC+: +1
OD_CC+: +1
CORRECTION_TYPE: GLASSES
OS_CC: 20/50
METHOD: SNELLEN - LINEAR
OD_CC: 20/60

## 2019-08-15 ASSESSMENT — REFRACTION_WEARINGRX
SPECS_TYPE: BIFOCAL
OS_CYLINDER: +2.00
OS_SPHERE: -3.50
OD_AXIS: 093
OD_ADD: +2.75
OD_CYLINDER: +2.25
OS_ADD: +2.75
OS_AXIS: 134
OD_SPHERE: -4.75

## 2019-08-15 ASSESSMENT — CONF VISUAL FIELD
METHOD: COUNTING FINGERS
OS_NORMAL: 1
OD_NORMAL: 1

## 2019-08-15 ASSESSMENT — SLIT LAMP EXAM - LIDS
COMMENTS: NORMAL
COMMENTS: NORMAL

## 2019-08-15 ASSESSMENT — TONOMETRY
IOP_METHOD: TONOPEN
OS_IOP_MMHG: 12
OD_IOP_MMHG: 10

## 2019-08-15 ASSESSMENT — CUP TO DISC RATIO
OS_RATIO: 0.3
OD_RATIO: 0.3

## 2019-08-15 NOTE — PROGRESS NOTES
CC: retina hemes  HPI: Supriya Herr is a  69 year old year-old patient referred by Dr Baeza for evaluation and treat of DMII with mild nonproliferative diabetic retinopathy and cataract.     Interval history:  Vision seems stable compared to last visait. Reports blood sugar control good and last A1c was 5.5 8/5/2019.       PMH:  History of Diabetes mellitus on insulin.   Past ocular history: History of cataract surgery left eye.   history of Macular hole left eye. Status post Pars plana vitrectomy, membrane peel, air-fluid exchange, SF6 gas infusion, left eye 2/14/2012 by Dr. Daniel     Retinal Imaging:  OCT 7-11-19  RE: few foveal cystic changes with/ worsened IRF; retina thinning and partial thickness Macula hole.  -> 308 -> 373  LE:  Retina irregularity, trace sup Epiretinal membrane; retina thinning. Tiny cystic edema.  -> 271 -> 260    optos pic 04/11/19 consistent with exam    fluorescein angiography: 04/11/19   Right eye: blockage of fluorescein angiography on the areas of heme; few microaneurysms; mild late Diabetic macular edema and PP leakage  No neovascularization elsewhere; no neovascularization of the disc.  Left eye: few microaneurysms; mild late Diabetic macular edema; staining of the peripheral Chorioretinal  scars    Assessment & Plan:  1. Moderate nonproliferative diabetic retinopathy and mild Diabetic macular edema both eyes  Worsened edema in right eye, stable in left eye     2. 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 Patient advised that failure to adequately control each may lead to vision loss. The patient expressed understanding.    3. Mod  Hypertensive retinopathy   Stage 5 kidney disease  Considering HD   blood pressure control has been poor in 160s/90s-100s  Likely contributing to macular edema    4. Cataract right eye   Becoming visually significant   intraocular lens calcs today  best corrected visual acuity 20/50 right eye   Plan for Cataract extraction intraocular lens placement in next few months     5. Pseudophakia left eye  best corrected visual acuity 20/30 left eye   Observe    6. History of Macula hole repair left eye by Dr. Daniel  Based on care everywhere records   Macula hole closed  Observe    7. Dry eye syndrome   Status post punctal plugs   artificial tears  As needed and warm compresses      Plan: return to clinic 4 weeks for OCT each eye    Porfirio Coy MD  Vitreoretinal surgery fellow  [facility]    ~~~~~~~~~~~~~~~~~~~~~~~~~~~~~~~~~~   Complete documentation of historical and exam elements from today's encounter can be found in the full encounter summary report (not reduplicated in this progress note).  I personally obtained the chief complaint(s) and history of present illness.  I confirmed and edited as necessary the review of systems, past medical/surgical history, family history, social history, and examination findings as documented by others; and I examined the patient myself.  I personally reviewed the relevant tests, images, and reports as documented above.  I formulated and edited as necessary the assessment and plan and discussed the findings and management plan with the patient and family and I was present for the entire procedure performed by the resident/fellow.    Leanne Jett MD   of Ophthalmology.  Retina Service   Department of Ophthalmology and Visual Neurosciences   [facility]  Phone: [phone]   Fax: [phone]

## 2019-08-15 NOTE — NURSING NOTE
Chief Complaints and History of Present Illnesses   Patient presents with     Follow Up     1 month follow up Moderate nonproliferative diabetic retinopathy and mild Diabetic macular edema both eyes     Chief Complaint(s) and History of Present Illness(es)     Follow Up     Comments: 1 month follow up Moderate nonproliferative diabetic retinopathy and mild Diabetic macular edema both eyes              Comments     Pt states vision is about the same as last visit. Itching in both eyes since last visit.   Still seeing floaters in both eyes, no changes.  DM2 BS: 69 this afternoon.  Lab Results       Component                Value               Date                  A1C: 5.5 taken about 2 weeks ago per pt.       A1C                      6.0                 06/22/2018                 A1C                      5.4                 03/29/2018                 A1C                      6.8                 01/09/2018                 A1C                      9.6                 06/09/2017                 A1C                      6.9                 02/14/2017              Salvatore BARBER August 15, 2019 3:20 PM

## 2019-08-19 ENCOUNTER — TELEPHONE (OUTPATIENT)
Dept: TRANSPLANT | Facility: CLINIC | Age: 69
End: 2019-08-19

## 2019-08-19 DIAGNOSIS — I89.0 LYMPHEDEMA OF BOTH LOWER EXTREMITIES: ICD-10-CM

## 2019-08-19 DIAGNOSIS — I10 ESSENTIAL HYPERTENSION WITH GOAL BLOOD PRESSURE LESS THAN 140/90: ICD-10-CM

## 2019-08-19 RX ORDER — FUROSEMIDE 20 MG
TABLET ORAL
Qty: 270 TABLET | Refills: 3 | Status: SHIPPED | OUTPATIENT
Start: 2019-08-19 | End: 2019-09-16 | Stop reason: DRUGHIGH

## 2019-08-26 ENCOUNTER — TELEPHONE (OUTPATIENT)
Dept: TRANSPLANT | Facility: CLINIC | Age: 69
End: 2019-08-26

## 2019-08-26 NOTE — TELEPHONE ENCOUNTER
Pt's daughter called to see if [facility] appointments could be rescheduled after pt's pulmonary appointment on 11/12/19. Daughter and pt want to make sure pt is cleared by pulmonary before proceeding with other waitlist appointments needed for active status consideration on the kidney transplant waitlist. Daughter will schedule mammo and let coordinator know once it's scheduled.     Msg sent to scheduling pool to contact daughter to reschedule all return waitlist appointments.

## 2019-08-28 ENCOUNTER — TELEPHONE (OUTPATIENT)
Dept: TRANSPLANT | Facility: CLINIC | Age: 69
End: 2019-08-28

## 2019-08-29 ENCOUNTER — TELEPHONE (OUTPATIENT)
Dept: FAMILY MEDICINE | Facility: CLINIC | Age: 69
End: 2019-08-29

## 2019-08-29 NOTE — TELEPHONE ENCOUNTER
Called patient. Pt states that she checked her BP right after taking albuterol. Upper/systolic number was 181. Writer notified patient that she can absolutely have higher blood pressure after taking albuterol and can have faster heart rate as well. Pt took albuterol around 2:15 or 2:45 pm. She will wait a couple more hours and then re-check her BP. States that her BP has been elevated around 150's 160's. Writer advised that if BP continues to be elevated, then please call us back so we can consult with her PCP or come in for BP follow up appt. She verbalized understanding.     Pt currently declines any headache, dizziness, lightheaded.    Ana Luisa Hankins RN  [facility]

## 2019-08-29 NOTE — TELEPHONE ENCOUNTER
Pt is calling to see if using an Albuterol inhaler can cause her blood pressure to increase.  Pt states that she used the inhaler today and her blood pressure increased.

## 2019-09-03 ENCOUNTER — TELEPHONE (OUTPATIENT)
Dept: NEPHROLOGY | Facility: CLINIC | Age: 69
End: 2019-09-03

## 2019-09-03 NOTE — TELEPHONE ENCOUNTER
[facility] Call Center    Phone Message    May a detailed message be left on voicemail: yes    Reason for Call: Other: Patient's daughter want to know for the appointment with Ethel Guerrero on Sept 13 can the patient use labs from Sept 6th appointment with her primary, instead of having them an hour prior to seeing Yolanda. Please call to discuss     Action Taken: Other: p nephrology

## 2019-09-03 NOTE — TELEPHONE ENCOUNTER
Reviewed with Ethel Guerrero NP, this is fine. Message left for patients sister.  Lu Goodwin LPN  Nephrology  [phone]

## 2019-09-06 ENCOUNTER — OFFICE VISIT (OUTPATIENT)
Dept: FAMILY MEDICINE | Facility: CLINIC | Age: 69
End: 2019-09-06
Payer: MEDICARE

## 2019-09-06 VITALS
OXYGEN SATURATION: 96 % | BODY MASS INDEX: 33.32 KG/M2 | HEART RATE: 66 BPM | WEIGHT: 212.3 LBS | TEMPERATURE: 98.1 F | DIASTOLIC BLOOD PRESSURE: 62 MMHG | RESPIRATION RATE: 14 BRPM | HEIGHT: 67 IN | SYSTOLIC BLOOD PRESSURE: 152 MMHG

## 2019-09-06 DIAGNOSIS — I73.9 PVD (PERIPHERAL VASCULAR DISEASE) (H): ICD-10-CM

## 2019-09-06 DIAGNOSIS — Z79.4 TYPE 2 DIABETES MELLITUS WITH DIABETIC NEUROPATHY, WITH LONG-TERM CURRENT USE OF INSULIN (H): ICD-10-CM

## 2019-09-06 DIAGNOSIS — E11.40 TYPE 2 DIABETES MELLITUS WITH DIABETIC NEUROPATHY, WITH LONG-TERM CURRENT USE OF INSULIN (H): ICD-10-CM

## 2019-09-06 DIAGNOSIS — R60.0 EDEMA LEG: ICD-10-CM

## 2019-09-06 DIAGNOSIS — Z74.09 IMMOBILITY: ICD-10-CM

## 2019-09-06 DIAGNOSIS — S78.112D: ICD-10-CM

## 2019-09-06 DIAGNOSIS — N18.5 CKD (CHRONIC KIDNEY DISEASE) STAGE 5, GFR LESS THAN 15 ML/MIN (H): ICD-10-CM

## 2019-09-06 DIAGNOSIS — J01.30 ACUTE NON-RECURRENT SPHENOIDAL SINUSITIS: Primary | ICD-10-CM

## 2019-09-06 PROCEDURE — 99214 OFFICE O/P EST MOD 30 MIN: CPT | Performed by: FAMILY MEDICINE

## 2019-09-06 RX ORDER — AZITHROMYCIN 250 MG/1
TABLET, FILM COATED ORAL
Qty: 6 TABLET | Refills: 0 | Status: SHIPPED | OUTPATIENT
Start: 2019-09-06 | End: 2019-09-13

## 2019-09-06 ASSESSMENT — MIFFLIN-ST. JEOR: SCORE: 1515.62

## 2019-09-06 NOTE — PROGRESS NOTES
"Subjective     Supriya Herr is a 69 year old female who presents to clinic today for the following health issues:    HPI   Acute Illness   Acute illness concerns: Congestion  Onset: 1.5 months    Fever: no    Chills/Sweats: YES- chills    Headache (location?): no    Sinus Pressure: at times behind eyes    Conjunctivitis:  YES- watering and retinas been bleeding    Ear Pain: no    Rhinorrhea: YES    Congestion: YES    Sore Throat: no      Cough: YES-non-productive    Wheeze: YES    Decreased Appetite: no     Nausea: no     Vomiting: no     Diarrhea:  YES    Dysuria/Freq.: no     Fatigue/Achiness: YES, more tired    Sick/Strep Exposure: no     Therapies Tried and outcome: drinking lots of water    Encounter Diagnoses   Name Primary?     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 6 tablet 0     order for DME Compression socks - knee  15-20 mmHg  Open toed.  Use daily. 1 Units 0     order for DME Equipment being ordered: mattress overlay for hospital bed  Wt. 192#  Height 5'5\"  99 months/Lifetime 1 Units 0     acetaminophen (TYLENOL) 325 MG tablet Take 2 tablets (650 mg) by mouth every 4 hours as needed for mild pain 100 tablet 0     albuterol (PROAIR HFA, PROVENTIL HFA, VENTOLIN HFA) 108 (90 BASE) MCG/ACT inhaler Inhale 2 puffs into the lungs every 6 hours as needed for shortness of breath / dyspnea " or wheezing 3 Inhaler 1     amLODIPine (NORVASC) 5 MG tablet Take 1 tablet (5 mg) by mouth 2 times daily 180 tablet 3     atorvastatin (LIPITOR) 20 MG tablet TAKE 1 TABLET BY MOUTH ONCE DAILY 90 tablet 3     blood glucose (ONETOUCH ULTRA) test strip TEST YOUR BLOOD SUGAR 3-4 TIMES PER DAY. 400 strip 1     carvedilol (COREG) 25 MG tablet Take 1 tablet (25 mg) by mouth 2 times daily (with meals) 180 tablet 3     clindamycin (CLINDAMAX) 1 % topical gel Apply topically 2 times daily       furosemide (LASIX) 20 MG tablet TAKE 2 TABLETS IN MORNING AND 1 TABLET IN AFTERNOON 270 tablet 3     furosemide (LASIX) 40 MG tablet Take 1 tablet (40 mg) by mouth every evening Taking 60 mg AM/40 mg PM 30 tablet 3     insulin aspart (NOVOLOG FLEXPEN) 100 UNIT/ML pen Inject 4 units SQ with breakfast, lunch and dinner. 20 mL 1     insulin glargine (BASAGLAR KWIKPEN) 100 UNIT/ML pen Inject 22 Units Subcutaneous At Bedtime Inject 22 U SubCut at HS 2 mL 3     insulin pen needle (ULTICARE MINI) 31G X 6 MM Use daily or as directed. 100 each 1     order for DME Equipment being ordered: Mattress Overlay for Hospital Bed. Height 65. Weight 168 lbs.  Length of need: Lifetime 1 Device 0     order for DME Equipment being ordered: toilet riser, lifetime need  DX amputation of leg above the knee, S78.119 1 Device 0     order for DME 1 SAD light 1 Device 1     order for DME Equipment being ordered: Right Lower extremity Solaris Ready wrap calf piece:  Size small/length tall , knee piece: Size small , Thigh piece size small/length average. 1 Units 0     order for DME 1 wheelchair 1 Device 0     order for DME Equipment being ordered: TEDS stocking   Below the knee 15-20 mg  Dispense 2  Use daily 2 Device 1     ORDER FOR DME Equipment being ordered: Compression stockings, 20-30 MMHG, knee high 1 Device 0     sertraline (ZOLOFT) 50 MG tablet Take 1 tablet (50 mg) by mouth daily 90 tablet 3     triamcinolone (KENALOG) 0.1 % external cream Apply to sites  "of dermatitis- the abdomen, armpits, groin as needed. 30 g 0     vitamin D3 (CHOLECALCIFEROL) 2000 units (50 mcg) tablet Take 1 tablet (2,000 Units) by mouth daily 100 tablet 3         Reviewed and updated as needed this visit by Provider         Review of Systems   ROS COMP: Constitutional, HEENT, cardiovascular, pulmonary, gi and gu systems are negative, except as otherwise noted.      Objective    BP (!) 152/62   Pulse 66   Temp 98.1  F (36.7  C) (Temporal)   Resp 14   Ht 1.702 m (5' 7\")   Wt 96.3 kg (212 lb 4.8 oz)   SpO2 96%   BMI 33.25 kg/m    Body mass index is 33.25 kg/m .  Physical Exam   GENERAL: alert, over weight and fatigued  HENT: normal cephalic/atraumatic, ear canals and TM's normal, nose and mouth without ulcers or lesions, rhinorrhea yellow, oropharynx clear, oral mucous membranes moist and sinuses: maxillary, ethmoid, sphenoid tenderness on left  NECK: no adenopathy, no asymmetry, masses, or scars and thyroid normal to palpation  RESP: lungs clear to auscultation - no rales, rhonchi or wheezes  CV: regular rates and rhythm, normal S1 S2, no S3 or S4 and trace+ right lower extremity pitting edema to knee    SKIN: no suspicious lesions or rashes  PSYCH: mentation appears normal, affect normal/bright    Diagnostic Test Results:  Labs reviewed in Epic        Assessment & Plan     1. Acute non-recurrent sphenoidal sinusitis  firsttry steam/ claritin and flonase , if not better in a week add  - azithromycin (ZITHROMAX) 250 MG tablet; Take 2 tablets (500 mg) by mouth daily for 1 day, THEN 1 tablet (250 mg) daily for 4 days.  Dispense: 6 tablet; Refill: 0    2. PVD (peripheral vascular disease) (H)  use  - order for DME; Compression socks - knee  15-20 mmHg  Open toed.  Use daily.  Dispense: 1 Units; Refill: 0    3. Edema leg  use  - order for DME; Compression socks - knee  15-20 mmHg  Open toed.  Use daily.  Dispense: 1 Units; Refill: 0    4. CKD (chronic kidney disease) stage 5, GFR less than 15 " "ml/min (H)  Follow up with consultant as planned.   - order for DME; Equipment being ordered: mattress overlay for hospital bed  Wt. 192#  Height 5'5\"  99 months/Lifetime  Dispense: 1 Units; Refill: 0    5. Immobility  needs  - order for DME; Equipment being ordered: mattress overlay for hospital bed  Wt. 192#  Height 5'5\"  99 months/Lifetime  Dispense: 1 Units; Refill: 0    6. 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 192#  Height 5'5\"  99 months/Lifetime  Dispense: 1 Units; Refill: 0    7. Type 2 diabetes mellitus with diabetic neuropathy, with long-term current use of insulin (H)    - order for DME; Equipment being ordered: mattress overlay for hospital bed  Wt. 192#  Height 5'5\"  99 months/Lifetime  Dispense: 1 Units; Refill: 0     Tobacco Cessation:   reports that she has been smoking cigarettes.  She started smoking about 55 years ago. She has a 26.00 pack-year smoking history. She has never used smokeless tobacco.  Tobacco Cessation Action Plan: Information offered: Patient not interested at this time           Follow up with consultant as planned.     No follow-ups on file.    Noam Jackson MD  [facility] [place]      "

## 2019-09-10 DIAGNOSIS — E11.3213 TYPE 2 DIABETES MELLITUS WITH MILD NONPROLIFERATIVE RETINOPATHY OF BOTH EYES AND MACULAR EDEMA, UNSPECIFIED WHETHER LONG TERM INSULIN USE (H): Primary | ICD-10-CM

## 2019-09-12 ENCOUNTER — OFFICE VISIT (OUTPATIENT)
Dept: OPHTHALMOLOGY | Facility: CLINIC | Age: 69
End: 2019-09-12
Attending: OPHTHALMOLOGY
Payer: MEDICARE

## 2019-09-12 DIAGNOSIS — E11.311 DIABETIC MACULAR EDEMA (H): Primary | ICD-10-CM

## 2019-09-12 PROCEDURE — 25000128 H RX IP 250 OP 636: Mod: ZF | Performed by: OPHTHALMOLOGY

## 2019-09-12 PROCEDURE — 92134 CPTRZ OPH DX IMG PST SGM RTA: CPT | Mod: ZF | Performed by: OPHTHALMOLOGY

## 2019-09-12 PROCEDURE — G0463 HOSPITAL OUTPT CLINIC VISIT: HCPCS | Mod: 25,ZF

## 2019-09-12 PROCEDURE — C9257 BEVACIZUMAB INJECTION: HCPCS | Mod: ZF | Performed by: OPHTHALMOLOGY

## 2019-09-12 PROCEDURE — 67028 INJECTION EYE DRUG: CPT | Mod: RT,ZF | Performed by: OPHTHALMOLOGY

## 2019-09-12 RX ADMIN — Medication 1.25 MG: at 16:28

## 2019-09-12 ASSESSMENT — VISUAL ACUITY
OS_CC+: -1
CORRECTION_TYPE: GLASSES
OD_CC: 20/60
OD_PH_CC: 20/50
OS_CC: 20/30
METHOD: SNELLEN - LINEAR

## 2019-09-12 ASSESSMENT — TONOMETRY
OD_IOP_MMHG: 12
OS_IOP_MMHG: 14
IOP_METHOD: TONOPEN

## 2019-09-12 ASSESSMENT — CONF VISUAL FIELD
METHOD: COUNTING FINGERS
OS_NORMAL: 1
OD_NORMAL: 1

## 2019-09-12 ASSESSMENT — REFRACTION_WEARINGRX
OD_AXIS: 093
OD_CYLINDER: +2.25
OS_AXIS: 134
OD_SPHERE: -4.75
SPECS_TYPE: BIFOCAL
OS_SPHERE: -3.50
OS_CYLINDER: +2.00
OS_ADD: +2.75
OD_ADD: +2.75

## 2019-09-12 ASSESSMENT — SLIT LAMP EXAM - LIDS
COMMENTS: NORMAL
COMMENTS: NORMAL

## 2019-09-12 ASSESSMENT — CUP TO DISC RATIO
OD_RATIO: 0.3
OS_RATIO: 0.3

## 2019-09-12 NOTE — NURSING NOTE
"Chief Complaints and History of Present Illnesses   Patient presents with     Follow Up     4 week follow-up  Moderate nonproliferative diabetic retinopathy and mild Diabetic macular edema both eyes     Chief Complaint(s) and History of Present Illness(es)     Follow Up     Comments: 4 week follow-up  Moderate nonproliferative diabetic retinopathy and mild Diabetic macular edema both eyes              Comments     Pt denies any significant vision changes in either eye since last visit.  Complains of occasional \"gold\" flashes of light LE and occasional itchiness BE.  Hx of floaters- unchanged.  Denies any pain, pressure, discharge, and tearing.  Type 2 DM- Checks BS daily, was 98 this morning and jumped to 192 this afternoon.  Lab Results       Component                Value               Date                       A1C                      6.0                 06/22/2018                 A1C                      5.4                 03/29/2018                 A1C                      6.8                 01/09/2018                 A1C                      9.6                 06/09/2017                 A1C                      6.9                 02/14/2017              Linda Dash OT 3:30 PM September 12, 2019                   "

## 2019-09-12 NOTE — PROGRESS NOTES
CC: follow up macula edema    HPI: Supriya Herr is a  69 year old year-old patient referred by Dr Baeza for evaluation and treat of DMII with mild nonproliferative diabetic retinopathy and cataract.     Interval history:  Vision seems stable compared to last visait. Reports blood sugar control good and last A1c was 5.5 8/5/2019.   No changes since last visit. Is ready for cataract surgery       PMH:  History of Diabetes mellitus on insulin.   Past ocular history: History of cataract surgery left eye.   history of Macular hole left eye.   Status post Pars plana vitrectomy, membrane peel, air-fluid exchange, SF6 gas infusion, left eye 2/14/2012 by Dr. Daniel     Retinal Imaging:  OCT 9/12/19  RE: few foveal cystic with much improved IRF; retina thinning and partial thickness Macula hole.  -> 308 -> 373 -> 330 -> 266  LE:  Retina irregularity, trace sup Epiretinal membrane; retina thinning. Tiny cystic edema.  -> 271 -> 260 -> 261    optos pic 04/11/19 consistent with exam    fluorescein angiography: 04/11/19   Right eye: blockage of fluorescein angiography on the areas of heme; few microaneurysms; mild late Diabetic macular edema and PP leakage  No neovascularization elsewhere; no neovascularization of the disc.  Left eye: few microaneurysms; mild late Diabetic macular edema; staining of the peripheral Chorioretinal  scars    Assessment & Plan:  1. Moderate nonproliferative diabetic retinopathy and mild - Diabetic macular edema both eyes  - Worsened edema in right eye, stable in left eye     2. 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   - Patient advised that failure to adequately control each may lead to vision loss. The patient  expressed understanding.    Plan for avastin inj today 09/12/19 (#2)    3. Mod Hypertensive retinopathy   - Stage 5 kidney disease  - Considering HD   - blood pressure control has been poor in 160s/90s-100s  - Likely contributing to macular edema    4. Cataract right eye   - Becoming visually significant   - intraocular lens calcs 7/11/19  - best corrected visual acuity 20/50 right eye   - Plan for Cataract extraction intraocular lens placement in next few months     5. Pseudophakia left eye  best corrected visual acuity 20/30 left eye   Observe    6. History of Macula hole repair left eye by Dr. Daniel  S/p PPV, mp, air-fluid exchange, SF6 gas infusion, left eye 2/14/2012 by Dr. Daniel    Macula hole closed  Observe    7. Dry eye syndrome   Status post punctal plugs   artificial tears  As needed and warm compresses      Plan: DOROTEO today right eye (#2), return to clinic 6 weeks for OCT each eye    Mich Devlin MD  Ophthalmology Resident  PGY-3     ~~~~~~~~~~~~~~~~~~~~~~~~~~~~~~~~~~   Complete documentation of historical and exam elements from today's encounter can be found in the full encounter summary report (not reduplicated in this progress note).  I personally obtained the chief complaint(s) and history of present illness.  I confirmed and edited as necessary the review of systems, past medical/surgical history, family history, social history, and examination findings as documented by others; and I examined the patient myself.  I personally reviewed the relevant tests, images, and reports as documented above.  I formulated and edited as necessary the assessment and plan and discussed the findings and management plan with the patient and family and I was present for the entire procedure performed by the resident/fellow.    Leanne Jett MD   of Ophthalmology.  Retina Service   Department of Ophthalmology and Visual Neurosciences   [facility]  Phone: [phone]   Fax:  [phone]

## 2019-09-13 ENCOUNTER — ANCILLARY PROCEDURE (OUTPATIENT)
Dept: GENERAL RADIOLOGY | Facility: CLINIC | Age: 69
End: 2019-09-13
Payer: MEDICARE

## 2019-09-13 ENCOUNTER — ANCILLARY PROCEDURE (OUTPATIENT)
Dept: ULTRASOUND IMAGING | Facility: CLINIC | Age: 69
End: 2019-09-13
Payer: MEDICARE

## 2019-09-13 ENCOUNTER — OFFICE VISIT (OUTPATIENT)
Dept: NEPHROLOGY | Facility: CLINIC | Age: 69
End: 2019-09-13
Payer: MEDICARE

## 2019-09-13 VITALS — SYSTOLIC BLOOD PRESSURE: 170 MMHG | DIASTOLIC BLOOD PRESSURE: 66 MMHG | OXYGEN SATURATION: 99 % | HEART RATE: 62 BPM

## 2019-09-13 DIAGNOSIS — I10 ESSENTIAL HYPERTENSION: ICD-10-CM

## 2019-09-13 DIAGNOSIS — N18.5 CKD (CHRONIC KIDNEY DISEASE) STAGE 5, GFR LESS THAN 15 ML/MIN (H): ICD-10-CM

## 2019-09-13 DIAGNOSIS — D63.1 ANEMIA DUE TO STAGE 5 CHRONIC KIDNEY DISEASE, NOT ON CHRONIC DIALYSIS (H): ICD-10-CM

## 2019-09-13 DIAGNOSIS — E55.9 VITAMIN D DEFICIENCY: ICD-10-CM

## 2019-09-13 DIAGNOSIS — R06.02 SHORTNESS OF BREATH: ICD-10-CM

## 2019-09-13 DIAGNOSIS — R60.0 UNILATERAL EDEMA OF LOWER EXTREMITY: ICD-10-CM

## 2019-09-13 DIAGNOSIS — N18.5 ANEMIA DUE TO STAGE 5 CHRONIC KIDNEY DISEASE, NOT ON CHRONIC DIALYSIS (H): ICD-10-CM

## 2019-09-13 DIAGNOSIS — N18.5 CKD (CHRONIC KIDNEY DISEASE) STAGE 5, GFR LESS THAN 15 ML/MIN (H): Primary | ICD-10-CM

## 2019-09-13 LAB
ALBUMIN SERPL-MCNC: 3 G/DL (ref 3.4–5)
ANION GAP SERPL CALCULATED.3IONS-SCNC: 6 MMOL/L (ref 3–14)
BUN SERPL-MCNC: 80 MG/DL (ref 7–30)
CALCIUM SERPL-MCNC: 8.9 MG/DL (ref 8.5–10.1)
CHLORIDE SERPL-SCNC: 113 MMOL/L (ref 94–109)
CO2 SERPL-SCNC: 23 MMOL/L (ref 20–32)
CREAT SERPL-MCNC: 4.58 MG/DL (ref 0.52–1.04)
GFR SERPL CREATININE-BSD FRML MDRD: 9 ML/MIN/{1.73_M2}
GLUCOSE SERPL-MCNC: 170 MG/DL (ref 70–99)
HGB BLD-MCNC: 8.9 G/DL (ref 11.7–15.7)
PHOSPHATE SERPL-MCNC: 5.3 MG/DL (ref 2.5–4.5)
POTASSIUM SERPL-SCNC: 5.1 MMOL/L (ref 3.4–5.3)
SODIUM SERPL-SCNC: 142 MMOL/L (ref 133–144)

## 2019-09-13 PROCEDURE — G0463 HOSPITAL OUTPT CLINIC VISIT: HCPCS

## 2019-09-13 PROCEDURE — 85018 HEMOGLOBIN: CPT

## 2019-09-13 PROCEDURE — 36415 COLL VENOUS BLD VENIPUNCTURE: CPT

## 2019-09-13 PROCEDURE — 80069 RENAL FUNCTION PANEL: CPT

## 2019-09-13 NOTE — LETTER
"9/13/2019       RE: Supriya Herr  [address]  [facility] [address]     Dear Colleague,    Thank you for referring your patient, Supriya Herr, to the [facility] NEPHROLOGY at [facility]. Please see a copy of my visit note below.    Chief Complaint   Patient presents with     Follow Up     1 month / labs drawn     Medication Refill     inhaler, diabetic medications, b/p       Blood pressure (!) 175/64, pulse 62, SpO2 99 %, not currently breastfeeding.    Rachel Morse [facility]       Nephrology Clinic Visit 9/13/19    Assessment and Plan:     1. CKD5 w/proteinuria- Renal function stable this month. Creat 4.5, eGFR 9 ml/mn, UPCR 6.28 g/gCr. No uremic symptoms   - Etiology of CKD felt to be DM, HTN, Vascular dz,    - Inactive on transplant wait list as of 4/18 due to being \"too sick\".    - Has been seen by Dr Martin and will need AVG for HD.    - Patient and daughter more accepting of Supriya starting HD. They are pleased that they were able to make their annual mother daughter trip prior to starting HD.    - We have decided that if eGFR is ~ 5 ml/mn, or if feeling unwell, will begin dialysis unit search and schedule her access placement in preparation for HD initiation. She is hoping to be able to find an HD unit where she could run later in the day so she can continue at the day program.    - No need to initiate at this time, no uremic symptoms, lytes, bicarb fine   - Does not use NSAIDs   - Blood pressures still not consistently controlled   - DM with excellent control. A1c 5.5 % 8/19   - On statin for CV risk reduction     2. Volume status - Appears to be more hypervolemic but was eating more salt on her trip. Has mild dyspnea. Blood pressures remain uncontrolled. Weight 212# on 9/6/19. CXR today showing mild interstitial edema. Doppler US neg for DVT    - Remains on Lasix 60 mg am, 40 mg pm    - Will increase Lasix to 60 mg bid.     - Resume Na restricted diet    "   3. HTN - Uncontrolled. Clinic blood pressures 170's/ x 3 and Day program blood pressures in the 160/ range, but no way to evaluate device.   Current regimen:    - lasix 60/40   - Coreg 25 mg bid   - Amlodipine 5 mg bid       - Will increase Lasix to 60 mg bid.     - Continue home monitoring   - Blood pressure goal < 130/80     4. Electrolytes - No acute concerns. K 5.1, Na 142    - Increasing Lasix will help with K     5. Acid base - No acute concerns. Bicarb 23     6. BMD - Ca corrected 9.7, Phos 5.3, Albumin 3.0   - Vit D 27,  (1/19)   - Continue Vit D 2000 U every day, but will hold Calcitriol given recent hypercalcemia   - Patient will work on dietary modification of phos, however if Phos ~ 6.0 next visit will start Renvela.      7. Anemia - Hgb 8.9 ( suspect dilutional)   - Iron studies 8/19: Ferritin 543, Fe 61, IS 27.    - Has not required KELLIE given baseline hgb ~ 10   - Colonoscopy 2018, tubular adenomas     8. DM2 - Well controlled. A1c 5.5 %. On Insulin      9. Depression - Controlled with increase in Zoloft to 50 mg every day.       10. Disposition - RTC 1 month for f/u     Assessment and plan was discussed with patient and she voiced her understanding and agreement.     Reason for Visit:  CKD5/HTN f/u     HPI:  Ms Herr is a 68 yo female with CKD5 and nephrotic range proteinuria, DM2, HTN, in today for routine CKD f/u. Last seen by me on 8/7/19. Decision was made to have access placed and begin search for HD unit when GFR is 5.  Coreg was increased last visit to 25 mg bid.      Interval Hx:   No hospital admissions     ROS:   Always very nervous about her visits. Concerned it will be time to start HD.   Blood pressures at day program remain in the 160/ range  No acute concerns.   Caroline and Supriya had a great trip to [place]. They traveled by car. Patient/Caroline notice increased LE edema since the trip and more shortness of breath. Patient has been using her inhaler more frequently. No cough or  fever. Probably ate more salt during the trip than usual.     Has good appetite. Denies chest pain, abdominal pain. No bowel or bladder concerns. Decided to not get a new prosthesis.      Chronic Health Problems:     CKD5  Proteinuria  AKA, left (10/18)  T2DM  Vit D def  HTN  HLD  Anxiety/depression  Vitiligo  Non proliferative diabetic retinopathy  BCC  JONE  Anemia  Prior tobacco abuse     Personal Hx:   Lives in lower level of [facility], daughter Caroline upper level. NS. Attends day program at [facility] Monday through Thursday    Family Hx:   Family History   Problem Relation Age of Onset     Diabetes Mother      Hypertension Mother      Heart Disease Mother          of congestive heart failure     Eye Disorder Mother      Arthritis Mother      Obesity Mother      Heart Disease Father          from CHF     Cerebrovascular Disease Father      Arthritis Father      Musculoskeletal Disorder Other         has MS     Thyroid Disease Other      Eye Disorder Other         cataracts     Cancer Other         throat/liver     Skin Cancer No family hx of      Melanoma No family hx of      Glaucoma No family hx of      Macular Degeneration No family hx of          Allergies:  Allergies   Allergen Reactions     Penicillins Rash     Unasyn Rash     No evidence SJS, but very uncomfortable and precipitated multiple provider visits. Would not use penicillins again if other options available.        Medications:  Current Outpatient Medications   Medication Sig     acetaminophen (TYLENOL) 325 MG tablet Take 2 tablets (650 mg) by mouth every 4 hours as needed for mild pain     albuterol (PROAIR HFA, PROVENTIL HFA, VENTOLIN HFA) 108 (90 BASE) MCG/ACT inhaler Inhale 2 puffs into the lungs every 6 hours as needed for shortness of breath / dyspnea or wheezing     amLODIPine (NORVASC) 5 MG tablet Take 1 tablet (5 mg) by mouth 2 times daily     atorvastatin (LIPITOR) 20 MG tablet TAKE 1 TABLET BY MOUTH ONCE DAILY     blood glucose  "(ONETOUCH ULTRA) test strip TEST YOUR BLOOD SUGAR 3-4 TIMES PER DAY.     carvedilol (COREG) 25 MG tablet Take 1 tablet (25 mg) by mouth 2 times daily (with meals)     clindamycin (CLINDAMAX) 1 % topical gel Apply topically 2 times daily     furosemide (LASIX) 20 MG tablet TAKE 2 TABLETS IN MORNING AND 1 TABLET IN AFTERNOON     furosemide (LASIX) 40 MG tablet Take 1 tablet (40 mg) by mouth every evening Taking 60 mg AM/40 mg PM     insulin aspart (NOVOLOG FLEXPEN) 100 UNIT/ML pen Inject 4 units SQ with breakfast, lunch and dinner.     insulin glargine (BASAGLAR KWIKPEN) 100 UNIT/ML pen Inject 22 Units Subcutaneous At Bedtime Inject 22 U SubCut at HS     insulin pen needle (ULTICARE MINI) 31G X 6 MM Use daily or as directed.     order for DME Compression socks - knee  15-20 mmHg  Open toed.  Use daily.     order for DME Equipment being ordered: mattress overlay for hospital bed  Wt. 192#  Height 5'5\"  99 months/Lifetime     order for DME Equipment being ordered: Mattress Overlay for Hospital Bed. Height 65. Weight 168 lbs.  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     order for DME 1 wheelchair     order for DME Equipment being ordered: TEDS stocking   Below the knee 15-20 mg  Dispense 2  Use daily     ORDER FOR DME Equipment being ordered: Compression stockings, 20-30 MMHG, knee high     sertraline (ZOLOFT) 50 MG tablet Take 1 tablet (50 mg) by mouth daily     triamcinolone (KENALOG) 0.1 % external cream Apply to sites of dermatitis- the abdomen, armpits, groin as needed.     vitamin D3 (CHOLECALCIFEROL) 2000 units (50 mcg) tablet Take 1 tablet (2,000 Units) by mouth daily     Current Facility-Administered " Medications   Medication     bevacizumab (AVASTIN) intravitreal inj 1.25 mg      Vitals:  B/P 170-175/64-67  P 67  212# 9/6/19    Exam:  GEN: Pleasant female in NAD. Daughter present.  CARDIAC: RRR  LUNGS: CTA  ABDOMEN: Soft NT  EXT: Left LORI, Right 1+ edema  NEURO: alert. Oriented. No asterixis    LABS:   CMP  Recent Labs   Lab Test 09/13/19  1508 08/07/19  1504 07/09/19  1513 05/24/19  1410    140 142 144   POTASSIUM 5.1 4.4 4.7 4.7   CHLORIDE 113* 112* 111* 113*   CO2 23 22 23 23   ANIONGAP 6 6 8 8   * 243* 121* 66*   BUN 80* 92* 93* 98*   CR 4.58* 4.85* 5.52* 4.15*   GFRESTIMATED 9* 8* 7* 10*   GFRESTBLACK 10* 10* 8* 12*   JODY 8.9 8.2* 8.4* 9.8     Recent Labs   Lab Test 07/31/18  1148 07/24/18  0900 07/17/18  0830 06/24/18  0623 06/23/18  2347 03/29/18  1410 11/13/17  0715   BILITOTAL  --   --   --  0.2 0.2 0.2 0.3   ALKPHOS  --   --   --  49 56 56 70   ALT  --   --   --  17 13 15 37   AST 24 24 12 16 12 14 30     CBC  Recent Labs   Lab Test 09/13/19  1508 08/07/19  1504 07/09/19  1513 04/24/19  1514 02/15/19  1459 01/18/19  1457 12/07/18  1411   HGB 8.9* 9.3* 9.3* 8.2* 10.3* 10.2* 10.3*   WBC  --   --  6.3  --  5.5 5.4 5.7   RBC  --   --  3.07*  --  3.40* 3.47* 3.47*   HCT  --   --  29.7*  --  31.6* 32.4* 32.3*   MCV  --   --  97  --  93 93 93   MCH  --   --  30.3  --  30.3 29.4 29.7   MCHC  --   --  31.3*  --  32.6 31.5 31.9   RDW  --   --  12.9  --  12.5 12.4 12.4   PLT  --   --  202  --  232 222 237     URINE STUDIES  Recent Labs   Lab Test 03/29/18  1448 01/25/18  0233 11/02/17  0602 10/26/16  1220  12/05/12  1541   COLOR Straw Light Yellow Light Yellow Yellow   < > Yellow   APPEARANCE Clear Clear Clear Slightly Cloudy   < > Clear   URINEGLC 50* Negative 300* >500*   < > 100*   URINEBILI Negative Negative Negative Negative   < > Negative   URINEKETONE Negative Negative Negative Negative   < > Negative   SG 1.008 1.007 1.010 1.014   < > 1.025   UBLD Negative Negative Negative Negative   < >  Small*   URINEPH 5.0 6.5 7.5* 6.0   < > 6.0   PROTEIN >499* 100* >600* >500*   < > 100*   UROBILINOGEN  --   --   --   --   --  0.2   NITRITE Negative Negative Negative Negative   < > Negative   LEUKEST Negative Negative Small* Small*   < > Negative   RBCU 1 0 1 5*   < >  --    WBCU 2 1 44* 54*   < >  --     < > = values in this interval not displayed.     Recent Labs   Lab Test 05/24/19  1420 12/07/18  1417 11/01/18  1533 05/24/18  1441   UTPG 6.28* 5.60* 6.71* 5.97*     PTH  Recent Labs   Lab Test 01/18/19  1457 10/17/18  1413 05/24/18  1435   PTHI 126* 127* 55     IRON STUDIES  Recent Labs   Lab Test 08/07/19  1504 07/09/19  1513 04/24/19  1514   IRON 61 55 34*   * 242 227*   IRONSAT 27 23 15   ELENA 543* 673* 298*     XR CHEST 2 VW  9/13/2019 4:40 PM       HISTORY: Shortness of breath     COMPARISON: 6/26/2018     FINDINGS: Low lung volumes. Small hiatal hernia. Interstitial  prominence. Enlarged heart. No pleural effusion. Streaky left basilar  opacity..                                                                      IMPRESSION:   1. Enlarged heart with low lung volumes and interstitial prominence,  mild interstitial edema.  2. Streaky left basilar opacity, likely atelectasis.    Exam: Ultrasound of the deep venous system of right dated 9/13/2019  4:58 PM     Clinical information: Bilateral lower extremity edema which are  suppressed     Comparison: 11/6/2017     Ordering provider: TONIA SIEGEL     Technique: Gray-scale evaluation with compression and Doppler  assessment of deep venous system for spontaneous and phasic flow, as  well as the presence of distal augmentation. Color flow images  obtained as needed. Gray-scale images with compression of the great  saphenous vein obtained as needed.     Findings:     Right leg:     CFV: Thrombus: No, Phasic: Yes,  Femoral vein, proximal: Thrombus: No, Phasic: Yes,  Femoral vein, mid: Thrombus: No, Phasic: Yes,  Femoral vein, distal: Thrombus: No,  Phasic: Yes,  Popliteal vein: Thrombus: No, Phasic: Yes,  PTV: Thrombus: Not visualized,  Peroneal vein: Thrombus: Not visualized  GSV: Thrombus: No,     Left leg:     CFV: Thrombus: No, Phasic: Yes,                                                                      Impression:     Right leg: No deep venous thrombosis demonstrated in the right lower  extremity deep veins     Silva Guerrero, APRN, CNP          Again, thank you for allowing me to participate in the care of your patient.      Sincerely,    Silva Guerrero, NP

## 2019-09-13 NOTE — PROGRESS NOTES
Chief Complaint   Patient presents with     Follow Up     1 month / labs drawn     Medication Refill     inhaler, diabetic medications, b/p       Blood pressure (!) 175/64, pulse 62, SpO2 99 %, not currently breastfeeding.    Rachel Morse, CMA

## 2019-09-15 NOTE — PROGRESS NOTES
"Nephrology Clinic Visit 9/13/19    Assessment and Plan:     1. CKD5 w/proteinuria- Renal function stable this month. Creat 4.5, eGFR 9 ml/mn, UPCR 6.28 g/gCr. No uremic symptoms   - Etiology of CKD felt to be DM, HTN, Vascular dz,    - Inactive on transplant wait list as of 4/18 due to being \"too sick\".    - Has been seen by Dr Martin and will need AVG for HD.    - Patient and daughter more accepting of Supriya starting HD. They are pleased that they were able to make their annual mother daughter trip prior to starting HD.    - We have decided that if eGFR is ~ 5 ml/mn, or if feeling unwell, will begin dialysis unit search and schedule her access placement in preparation for HD initiation. She is hoping to be able to find an HD unit where she could run later in the day so she can continue at the day program.    - No need to initiate at this time, no uremic symptoms, lytes, bicarb fine   - Does not use NSAIDs   - Blood pressures still not consistently controlled   - DM with excellent control. A1c 5.5 % 8/19   - On statin for CV risk reduction     2. Volume status - Appears to be more hypervolemic but was eating more salt on her trip. Has mild dyspnea. Blood pressures remain uncontrolled. Weight 212# on 9/6/19. CXR today showing mild interstitial edema. Doppler US neg for DVT    - Remains on Lasix 60 mg am, 40 mg pm    - Will increase Lasix to 60 mg bid.     - Resume Na restricted diet      3. HTN - Uncontrolled. Clinic blood pressures 170's/ x 3 and Day program blood pressures in the 160/ range, but no way to evaluate device.   Current regimen:    - lasix 60/40   - Coreg 25 mg bid   - Amlodipine 5 mg bid       - Will increase Lasix to 60 mg bid.     - Continue home monitoring   - Blood pressure goal < 130/80     4. Electrolytes - No acute concerns. K 5.1, Na 142    - Increasing Lasix will help with K     5. Acid base - No acute concerns. Bicarb 23     6. BMD - Ca corrected 9.7, Phos 5.3, Albumin 3.0   - Vit D 27, "  (1/19)   - Continue Vit D 2000 U every day, but will hold Calcitriol given recent hypercalcemia   - Patient will work on dietary modification of phos, however if Phos ~ 6.0 next visit will start Renvela.      7. Anemia - Hgb 8.9 ( suspect dilutional)   - Iron studies 8/19: Ferritin 543, Fe 61, IS 27.    - Has not required KELLIE given baseline hgb ~ 10   - Colonoscopy 2018, tubular adenomas     8. DM2 - Well controlled. A1c 5.5 %. On Insulin      9. Depression - Controlled with increase in Zoloft to 50 mg every day.       10. Disposition - RTC 1 month for f/u     Assessment and plan was discussed with patient and she voiced her understanding and agreement.     Reason for Visit:  CKD5/HTN f/u     HPI:  Ms Herr is a 70 yo female with CKD5 and nephrotic range proteinuria, DM2, HTN, in today for routine CKD f/u. Last seen by me on 8/7/19. Decision was made to have access placed and begin search for HD unit when GFR is 5.  Coreg was increased last visit to 25 mg bid.      Interval Hx:   No hospital admissions     ROS:   Always very nervous about her visits. Concerned it will be time to start HD.   Blood pressures at day program remain in the 160/ range  No acute concerns.   Caroline and Supriya had a great trip to [place]. They traveled by car. Patient/Caroline notice increased LE edema since the trip and more shortness of breath. Patient has been using her inhaler more frequently. No cough or fever. Probably ate more salt during the trip than usual.     Has good appetite. Denies chest pain, abdominal pain. No bowel or bladder concerns. Decided to not get a new prosthesis.      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 NS. Attends day program at [facility] Monday through  Thursday    Family Hx:   Family History   Problem Relation Age of Onset     Diabetes Mother      Hypertension Mother      Heart Disease Mother          of congestive heart failure     Eye Disorder Mother      Arthritis Mother      Obesity Mother      Heart Disease Father          from CHF     Cerebrovascular Disease Father      Arthritis Father      Musculoskeletal Disorder Other         has MS     Thyroid Disease Other      Eye Disorder Other         cataracts     Cancer Other         throat/liver     Skin Cancer No family hx of      Melanoma No family hx of      Glaucoma No family hx of      Macular Degeneration No family hx of          Allergies:  Allergies   Allergen Reactions     Penicillins Rash     Unasyn Rash     No evidence SJS, but very uncomfortable and precipitated multiple provider visits. Would not use penicillins again if other options available.        Medications:  Current Outpatient Medications   Medication Sig     acetaminophen (TYLENOL) 325 MG tablet Take 2 tablets (650 mg) by mouth every 4 hours as needed for mild pain     albuterol (PROAIR HFA, PROVENTIL HFA, VENTOLIN HFA) 108 (90 BASE) MCG/ACT inhaler Inhale 2 puffs into the lungs every 6 hours as needed for shortness of breath / dyspnea or wheezing     amLODIPine (NORVASC) 5 MG tablet Take 1 tablet (5 mg) by mouth 2 times daily     atorvastatin (LIPITOR) 20 MG tablet TAKE 1 TABLET BY MOUTH ONCE DAILY     blood glucose (ONETOUCH ULTRA) test strip TEST YOUR BLOOD SUGAR 3-4 TIMES PER DAY.     carvedilol (COREG) 25 MG tablet Take 1 tablet (25 mg) by mouth 2 times daily (with meals)     clindamycin (CLINDAMAX) 1 % topical gel Apply topically 2 times daily     furosemide (LASIX) 20 MG tablet TAKE 2 TABLETS IN MORNING AND 1 TABLET IN AFTERNOON     furosemide (LASIX) 40 MG tablet Take 1 tablet (40 mg) by mouth every evening Taking 60 mg AM/40 mg PM     insulin aspart (NOVOLOG FLEXPEN) 100 UNIT/ML pen Inject 4 units SQ with breakfast, lunch  "and dinner.     insulin glargine (BASAGLAR KWIKPEN) 100 UNIT/ML pen Inject 22 Units Subcutaneous At Bedtime Inject 22 U SubCut at HS     insulin pen needle (ULTICARE MINI) 31G X 6 MM Use daily or as directed.     order for DME Compression socks - knee  15-20 mmHg  Open toed.  Use daily.     order for DME Equipment being ordered: mattress overlay for hospital bed  Wt. 192#  Height 5'5\"  99 months/Lifetime     order for DME Equipment being ordered: Mattress Overlay for Hospital Bed. Height 65. Weight 168 lbs.  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     order for DME 1 wheelchair     order for DME Equipment being ordered: TEDS stocking   Below the knee 15-20 mg  Dispense 2  Use daily     ORDER FOR DME Equipment being ordered: Compression stockings, 20-30 MMHG, knee high     sertraline (ZOLOFT) 50 MG tablet Take 1 tablet (50 mg) by mouth daily     triamcinolone (KENALOG) 0.1 % external cream Apply to sites of dermatitis- the abdomen, armpits, groin as needed.     vitamin D3 (CHOLECALCIFEROL) 2000 units (50 mcg) tablet Take 1 tablet (2,000 Units) by mouth daily     Current Facility-Administered Medications   Medication     bevacizumab (AVASTIN) intravitreal inj 1.25 mg      Vitals:  B/P 170-175/64-67  P 67  212# 9/6/19    Exam:  GEN: Pleasant female in NAD. Daughter present.  CARDIAC: RRR  LUNGS: CTA  ABDOMEN: Soft NT  EXT: Left LORI, Right 1+ edema  NEURO: alert. Oriented. No asterixis    LABS:   CMP  Recent Labs   Lab Test 09/13/19  1508 08/07/19  1504 07/09/19  1513 05/24/19  1410    140 142 144   POTASSIUM 5.1 4.4 4.7 4.7   CHLORIDE 113* 112* 111* 113*   CO2 23 22 23 23   ANIONGAP 6 6 8 8   * " 243* 121* 66*   BUN 80* 92* 93* 98*   CR 4.58* 4.85* 5.52* 4.15*   GFRESTIMATED 9* 8* 7* 10*   GFRESTBLACK 10* 10* 8* 12*   JODY 8.9 8.2* 8.4* 9.8     Recent Labs   Lab Test 07/31/18  1148 07/24/18  0900 07/17/18  0830 06/24/18  0623 06/23/18  2347 03/29/18  1410 11/13/17  0715   BILITOTAL  --   --   --  0.2 0.2 0.2 0.3   ALKPHOS  --   --   --  49 56 56 70   ALT  --   --   --  17 13 15 37   AST 24 24 12 16 12 14 30     CBC  Recent Labs   Lab Test 09/13/19  1508 08/07/19  1504 07/09/19  1513 04/24/19  1514 02/15/19  1459 01/18/19  1457 12/07/18  1411   HGB 8.9* 9.3* 9.3* 8.2* 10.3* 10.2* 10.3*   WBC  --   --  6.3  --  5.5 5.4 5.7   RBC  --   --  3.07*  --  3.40* 3.47* 3.47*   HCT  --   --  29.7*  --  31.6* 32.4* 32.3*   MCV  --   --  97  --  93 93 93   MCH  --   --  30.3  --  30.3 29.4 29.7   MCHC  --   --  31.3*  --  32.6 31.5 31.9   RDW  --   --  12.9  --  12.5 12.4 12.4   PLT  --   --  202  --  232 222 237     URINE STUDIES  Recent Labs   Lab Test 03/29/18  1448 01/25/18  0233 11/02/17  0602 10/26/16  1220  12/05/12  1541   COLOR Straw Light Yellow Light Yellow Yellow   < > Yellow   APPEARANCE Clear Clear Clear Slightly Cloudy   < > Clear   URINEGLC 50* Negative 300* >500*   < > 100*   URINEBILI Negative Negative Negative Negative   < > Negative   URINEKETONE Negative Negative Negative Negative   < > Negative   SG 1.008 1.007 1.010 1.014   < > 1.025   UBLD Negative Negative Negative Negative   < > Small*   URINEPH 5.0 6.5 7.5* 6.0   < > 6.0   PROTEIN >499* 100* >600* >500*   < > 100*   UROBILINOGEN  --   --   --   --   --  0.2   NITRITE Negative Negative Negative Negative   < > Negative   LEUKEST Negative Negative Small* Small*   < > Negative   RBCU 1 0 1 5*   < >  --    WBCU 2 1 44* 54*   < >  --     < > = values in this interval not displayed.     Recent Labs   Lab Test 05/24/19  1420 12/07/18  1417 11/01/18  1533 05/24/18  1441   UTPG 6.28* 5.60* 6.71* 5.97*     PTH  Recent Labs   Lab Test 01/18/19  1457  10/17/18  1413 05/24/18  1435   PTHI 126* 127* 55     IRON STUDIES  Recent Labs   Lab Test 08/07/19  1504 07/09/19  1513 04/24/19  1514   IRON 61 55 34*   * 242 227*   IRONSAT 27 23 15   ELENA 543* 673* 298*     XR CHEST 2 VW  9/13/2019 4:40 PM       HISTORY: Shortness of breath     COMPARISON: 6/26/2018     FINDINGS: Low lung volumes. Small hiatal hernia. Interstitial  prominence. Enlarged heart. No pleural effusion. Streaky left basilar  opacity..                                                                      IMPRESSION:   1. Enlarged heart with low lung volumes and interstitial prominence,  mild interstitial edema.  2. Streaky left basilar opacity, likely atelectasis.    Exam: Ultrasound of the deep venous system of right dated 9/13/2019  4:58 PM     Clinical information: Bilateral lower extremity edema which are  suppressed     Comparison: 11/6/2017     Ordering provider: TONIA SIEGEL     Technique: Gray-scale evaluation with compression and Doppler  assessment of deep venous system for spontaneous and phasic flow, as  well as the presence of distal augmentation. Color flow images  obtained as needed. Gray-scale images with compression of the great  saphenous vein obtained as needed.     Findings:     Right leg:     CFV: Thrombus: No, Phasic: Yes,  Femoral vein, proximal: Thrombus: No, Phasic: Yes,  Femoral vein, mid: Thrombus: No, Phasic: Yes,  Femoral vein, distal: Thrombus: No, Phasic: Yes,  Popliteal vein: Thrombus: No, Phasic: Yes,  PTV: Thrombus: Not visualized,  Peroneal vein: Thrombus: Not visualized  GSV: Thrombus: No,     Left leg:     CFV: Thrombus: No, Phasic: Yes,                                                                      Impression:     Right leg: No deep venous thrombosis demonstrated in the right lower  extremity deep veins     Silva Guerrero, APRN, CNP

## 2019-09-16 ENCOUNTER — CARE COORDINATION (OUTPATIENT)
Dept: NEPHROLOGY | Facility: CLINIC | Age: 69
End: 2019-09-16

## 2019-09-16 DIAGNOSIS — D63.1 ANEMIA DUE TO STAGE 5 CHRONIC KIDNEY DISEASE, NOT ON CHRONIC DIALYSIS (H): ICD-10-CM

## 2019-09-16 DIAGNOSIS — N18.5 CKD (CHRONIC KIDNEY DISEASE) STAGE 5, GFR LESS THAN 15 ML/MIN (H): ICD-10-CM

## 2019-09-16 DIAGNOSIS — N18.5 ANEMIA IN STAGE 5 CHRONIC KIDNEY DISEASE (H): Primary | ICD-10-CM

## 2019-09-16 DIAGNOSIS — D63.1 ANEMIA IN STAGE 5 CHRONIC KIDNEY DISEASE (H): Primary | ICD-10-CM

## 2019-09-16 DIAGNOSIS — N18.5 ANEMIA DUE TO STAGE 5 CHRONIC KIDNEY DISEASE, NOT ON CHRONIC DIALYSIS (H): ICD-10-CM

## 2019-09-16 RX ORDER — FUROSEMIDE 40 MG
60 TABLET ORAL 2 TIMES DAILY
Qty: 90 TABLET | Refills: 3 | Status: SHIPPED | OUTPATIENT
Start: 2019-09-16 | End: 2019-10-16

## 2019-09-19 ENCOUNTER — TELEPHONE (OUTPATIENT)
Dept: PHARMACY | Facility: CLINIC | Age: 69
End: 2019-09-19

## 2019-09-19 DIAGNOSIS — N18.5 CKD (CHRONIC KIDNEY DISEASE) STAGE 5, GFR LESS THAN 15 ML/MIN (H): Primary | ICD-10-CM

## 2019-09-19 DIAGNOSIS — D63.1 ANEMIA OF CHRONIC RENAL FAILURE, STAGE 5 (H): ICD-10-CM

## 2019-09-19 DIAGNOSIS — N18.5 ANEMIA OF CHRONIC RENAL FAILURE, STAGE 5 (H): ICD-10-CM

## 2019-09-19 NOTE — TELEPHONE ENCOUNTER
Anemia Management Note - Enrollment  SUBJECTIVE/OBJECTIVE:    Referred by Ethel Guerrero NP on 2020  Primary Diagnosis: Anemia in Chronic Kidney Disease (N18.5, D63.1)     Secondary Diagnosis:  Chronic Kidney Disease, Stage 5 (N18.5)  Hgb goal range:  9-10  Epo/Darbo: TBD.  Elevated BP   Iron regimen:  Ferrous Sulfate daily. (19)  Labs : 2020  Recent KELLIE use, transfusion, IV iron: Unknown  RX/TX plans : TBD  No history of stroke and blood clots.  Hx of HTN, CHF and Basal Cell Carcinoma ()      Contact:  Ok to leave message regarding Medical, Billing and Scheduling information per consent to communicate dated 10/19/2015    OK to speak with Caroline Sandoval (daughter) and Caroline Baird (sister) regarding Medical, Billing and Scheduling information per consent to communicate dated 10/19/2015        Anemia Latest Ref Rng & Units 2018 2019 2/15/2019 2019 2019 2019 2019   Hemoglobin 11.7 - 15.7 g/dL 10.3(L) 10.2(L) 10.3(L) 8.2(L) 9.3(L) 9.3(L) 8.9(L)   TSAT 15 - 46 %  15  27 -   Ferritin 8 - 252 ng/mL 348(H) - 316(H) 298(H) 673(H) 543(H) -       BP Readings from Last 3 Encounters:   19 (!) 170/66   19 (!) 152/62   19 (!) 153/69     Wt Readings from Last 2 Encounters:   19 96.3 kg (212 lb 4.8 oz)   19 87.4 kg (192 lb 9.6 oz)     Current Outpatient Medications   Medication Sig Dispense Refill     acetaminophen (TYLENOL) 325 MG tablet Take 2 tablets (650 mg) by mouth every 4 hours as needed for mild pain 100 tablet 0     albuterol (PROAIR HFA, PROVENTIL HFA, VENTOLIN HFA) 108 (90 BASE) MCG/ACT inhaler Inhale 2 puffs into the lungs every 6 hours as needed for shortness of breath / dyspnea or wheezing 3 Inhaler 1     amLODIPine (NORVASC) 5 MG tablet Take 1 tablet (5 mg) by mouth 2 times daily 180 tablet 3     atorvastatin (LIPITOR) 20 MG tablet TAKE 1 TABLET BY MOUTH ONCE DAILY 90 tablet 3     blood glucose (ONETOUCH ULTRA) test strip  "TEST YOUR BLOOD SUGAR 3-4 TIMES PER DAY. 400 strip 1     carvedilol (COREG) 25 MG tablet Take 1 tablet (25 mg) by mouth 2 times daily (with meals) 180 tablet 3     clindamycin (CLINDAMAX) 1 % topical gel Apply topically 2 times daily       furosemide (LASIX) 40 MG tablet Take 1.5 tablets (60 mg) by mouth 2 times daily 90 tablet 3     insulin aspart (NOVOLOG FLEXPEN) 100 UNIT/ML pen Inject 4 units SQ with breakfast, lunch and dinner. 20 mL 1     insulin glargine (BASAGLAR KWIKPEN) 100 UNIT/ML pen Inject 22 Units Subcutaneous At Bedtime Inject 22 U SubCut at HS 2 mL 3     insulin pen needle (ULTICARE MINI) 31G X 6 MM Use daily or as directed. 100 each 1     order for DME Compression socks - knee  15-20 mmHg  Open toed.  Use daily. 1 Units 0     order for DME Equipment being ordered: mattress overlay for hospital bed  Wt. 192#  Height 5'5\"  99 months/Lifetime 1 Units 0     order for DME Equipment being ordered: Mattress Overlay for Hospital Bed. Height 65. Weight 168 lbs.  Length of need: Lifetime 1 Device 0     order for DME Equipment being ordered: toilet riser, lifetime need  DX amputation of leg above the knee, S78.119 1 Device 0     order for DME 1 SAD light 1 Device 1     order for DME Equipment being ordered: Right Lower extremity Solaris Ready wrap calf piece:  Size small/length tall , knee piece: Size small , Thigh piece size small/length average. 1 Units 0     order for DME 1 wheelchair 1 Device 0     order for DME Equipment being ordered: TEDS stocking   Below the knee 15-20 mg  Dispense 2  Use daily 2 Device 1     ORDER FOR DME Equipment being ordered: Compression stockings, 20-30 MMHG, knee high 1 Device 0     sertraline (ZOLOFT) 50 MG tablet Take 1 tablet (50 mg) by mouth daily 90 tablet 3     triamcinolone (KENALOG) 0.1 % external cream Apply to sites of dermatitis- the abdomen, armpits, groin as needed. 30 g 0     vitamin D3 (CHOLECALCIFEROL) 2000 units (50 mcg) tablet Take 1 tablet (2,000 Units) by " mouth daily 100 tablet 3     ASSESSMENT:  Hgb Not at goal/Intentional hold   Ferritin: At goal (>100ng/mL)  TSat: not at goal (>30%) but ferritin >500ng/mL.  IV iron not indicated at this time per anemia protocol.  Iron regimen recommended: NA  Recommended KELLIE regimen when BP is stable  Blood Pressure: HTN.     PLAN:  1. Will follow up ith Pt/daughter early next week for enrollment in Anemia Management Service.  9/24/19: Spoke with Caroline  2. Disccussed:  anemia overview, monitoring service and goal hemoglobin range and rationale and risks of KELLIE blood clots, stroke and increase in blood pressure  3. Dose location: in clinic TBD  4. Labs: [place]  5. Pharmacy: [facility]  6. Will send new patient letter with medication guide to patient after we discuss Anemia services.     Hx of Elevated BP's. Last clinic reading was 170/66 Lasix dose was adjusted.  Will start KELLIE therapy once BP is stable.     9/24/2019: Spoke with Caroline (daughter), she will start Ferrous Sulfate 1xday with dinner.  BP continues to run in the 160's.  Labs will be rechecked on 9/27/2019.  Caroline will be out of town until late 10/4/2019.  If Hgb needs urgent attn call Supriya sister (Caroline). Otherwise will follow up on 10/07/19.  Caroline agreed with this plan.     Next call date:  09/24/2019  Assess BP  F/U 10/07/2019    Leslie Rivas RN   Anemia Services  [facility]  [address] [place] [id number]   [email or web address]   Office : [phone]  Fax: [phone]

## 2019-09-27 DIAGNOSIS — Z76.82 ORGAN TRANSPLANT CANDIDATE: ICD-10-CM

## 2019-09-27 DIAGNOSIS — N18.6 ESRD (END STAGE RENAL DISEASE) (H): ICD-10-CM

## 2019-09-27 DIAGNOSIS — N18.5 ANEMIA OF CHRONIC RENAL FAILURE, STAGE 5 (H): ICD-10-CM

## 2019-09-27 DIAGNOSIS — D63.1 ANEMIA OF CHRONIC RENAL FAILURE, STAGE 5 (H): ICD-10-CM

## 2019-09-27 DIAGNOSIS — N18.5 CKD (CHRONIC KIDNEY DISEASE) STAGE 5, GFR LESS THAN 15 ML/MIN (H): ICD-10-CM

## 2019-09-27 DIAGNOSIS — Z01.810 ENCOUNTER FOR PRE-OPERATIVE CARDIOVASCULAR CLEARANCE: ICD-10-CM

## 2019-09-27 LAB
ALBUMIN SERPL-MCNC: 3.1 G/DL (ref 3.4–5)
ANION GAP SERPL CALCULATED.3IONS-SCNC: 7 MMOL/L (ref 3–14)
BUN SERPL-MCNC: 98 MG/DL (ref 7–30)
CALCIUM SERPL-MCNC: 8.7 MG/DL (ref 8.5–10.1)
CHLORIDE SERPL-SCNC: 114 MMOL/L (ref 94–109)
CO2 SERPL-SCNC: 22 MMOL/L (ref 20–32)
CREAT SERPL-MCNC: 5.2 MG/DL (ref 0.52–1.04)
FERRITIN SERPL-MCNC: 557 NG/ML (ref 8–252)
GFR SERPL CREATININE-BSD FRML MDRD: 8 ML/MIN/{1.73_M2}
GLUCOSE SERPL-MCNC: 110 MG/DL (ref 70–99)
HCT VFR BLD AUTO: 27.8 % (ref 35–47)
HGB BLD-MCNC: 8.7 G/DL (ref 11.7–15.7)
IRON SATN MFR SERPL: 22 % (ref 15–46)
IRON SERPL-MCNC: 52 UG/DL (ref 35–180)
PHOSPHATE SERPL-MCNC: 6 MG/DL (ref 2.5–4.5)
POTASSIUM SERPL-SCNC: 4.8 MMOL/L (ref 3.4–5.3)
SODIUM SERPL-SCNC: 143 MMOL/L (ref 133–144)
TIBC SERPL-MCNC: 237 UG/DL (ref 240–430)

## 2019-09-30 LAB
PROTOCOL CUTOFF: NORMAL
SA1 CELL: NORMAL
SA1 COMMENTS: NORMAL
SA1 HI RISK ABY: NORMAL
SA1 MOD RISK ABY: NORMAL
SA1 TEST METHOD: NORMAL
SA2 CELL: NORMAL
SA2 COMMENTS: NORMAL
SA2 HI RISK ABY UA: NORMAL
SA2 MOD RISK ABY: NORMAL
SA2 TEST METHOD: NORMAL
UNACCEPTABLE ANTIGEN: NORMAL
UNOS CPRA: 58

## 2019-10-07 ENCOUNTER — TELEPHONE (OUTPATIENT)
Dept: PHARMACY | Facility: CLINIC | Age: 69
End: 2019-10-07

## 2019-10-07 NOTE — TELEPHONE ENCOUNTER
Anemia Management Note  SUBJECTIVE/OBJECTIVE:  Referred by Ethel Guerrero NP on 2020  Primary Diagnosis: Anemia in Chronic Kidney Disease (N18.5, D63.1)     Secondary Diagnosis:  Chronic Kidney Disease, Stage 5 (N18.5)  Hgb goal range:  9-10   Epo/Darbo: Aranesp 40mcg every 14 days for Hgb <10. In Clinic  Iron regimen:  Ferrous Sulfate daily. (19)  Labs : 2020  Recent KELLIE use, transfusion, IV iron: Unknown  RX/TX plans : TBD  No history of stroke and blood clots.  Hx of HTN, CHF and Basal Cell Carcinoma ()        Contact:            Ok to leave message regarding Medical, Billing and Scheduling information per consent to communicate dated 10/19/2015                              OK to speak with Caroline Sandoval (daughter) and Caroline Baird (sister) regarding Medical, Billing and Scheduling information per consent to communicate dated 10/19/2015    Anemia Latest Ref Rng & Units 2019 2/15/2019 2019 2019 2019 2019 2019   Hemoglobin 11.7 - 15.7 g/dL 10.2(L) 10.3(L) 8.2(L) 9.3(L) 9.3(L) 8.9(L) 8.7(L)   TSAT 15 - 46 % - 24 15 23 27 - 22   Ferritin 8 - 252 ng/mL - 316(H) 298(H) 673(H) 543(H) - 557(H)     BP Readings from Last 3 Encounters:   19 (!) 170/66   19 (!) 152/62   19 (!) 153/69     Wt Readings from Last 2 Encounters:   19 96.3 kg (212 lb 4.8 oz)   19 87.4 kg (192 lb 9.6 oz)       Call placed to Caroline (daughter) to discuss BP's.   Blood pressure is improving. Last systolic from Thursday 10/3/2019 was 149.  Will move forward with the Aranesp.     ASSESSMENT:  Hgb:Not at goal/Initiation of therapy  TSat: not at goal (>30%) but ferritin >500ng/mL.  IV iron not indicated at this time per anemia protocol. Ferritin: At goal (>100ng/mL)    PLAN:  Dose with aranesp on 10/16/19 and RTC for hgb then aranesp if needed in 2 week(s)    Orders needed to be renewed (for next follow-up date) in EPIC: None    Iron labs due:  10/26/2019    Plan  discussed with:  Caroline   Plan provided by:  mingo    NEXT FOLLOW-UP DATE:  10/17/2019    Anemia Management Service  Leslie Rivas RN  Phone: [phone]  Fax: [phone]

## 2019-10-09 DIAGNOSIS — E11.65 TYPE 2 DIABETES MELLITUS WITH HYPERGLYCEMIA, WITH LONG-TERM CURRENT USE OF INSULIN (H): ICD-10-CM

## 2019-10-09 DIAGNOSIS — Z79.4 TYPE 2 DIABETES MELLITUS WITH HYPERGLYCEMIA, WITH LONG-TERM CURRENT USE OF INSULIN (H): ICD-10-CM

## 2019-10-10 NOTE — TELEPHONE ENCOUNTER
NOVOLOG FLEXPEN 100 UNIT/ML soln      Last Written Prescription Date:  12-12-18  Last Fill Quantity: 20 ml,   # refills: 1  Last Office Visit : 8-2-19  Future Office visit:  none    Routing refill request to provider for review/approval because:  Insulin - refilled per clinic

## 2019-10-11 ENCOUNTER — OFFICE VISIT (OUTPATIENT)
Dept: ORTHOPEDICS | Facility: CLINIC | Age: 69
End: 2019-10-11
Payer: MEDICARE

## 2019-10-11 DIAGNOSIS — Z79.4 TYPE 2 DIABETES MELLITUS WITH DIABETIC NEUROPATHY, WITH LONG-TERM CURRENT USE OF INSULIN (H): Primary | ICD-10-CM

## 2019-10-11 DIAGNOSIS — E11.40 TYPE 2 DIABETES MELLITUS WITH DIABETIC NEUROPATHY, WITH LONG-TERM CURRENT USE OF INSULIN (H): Primary | ICD-10-CM

## 2019-10-11 DIAGNOSIS — B35.1 OM (ONYCHOMYCOSIS): ICD-10-CM

## 2019-10-11 DIAGNOSIS — L98.9 SKIN LESION: ICD-10-CM

## 2019-10-11 NOTE — PROGRESS NOTES
Chief Complaint:   Chief Complaint   Patient presents with     RECHECK     3 month follow up foot care sore on top of foot          Allergies   Allergen Reactions     Penicillins Rash     Unasyn Rash     No evidence SJS, but very uncomfortable and precipitated multiple provider visits. Would not use penicillins again if other options available.          Subjective: Supriya is a 69 year old female who presents to the clinic today for a diabetic foot exam and management. She relates that she has an area of irritation to the top of the right foot from the shoe buckle.      Objective  Non-palpable DP and PT pulses BL.   Equinus noted BL. Pes planus with rigid toe deformities noted to lesser digits on the right. Left AKA noted.   Nails are thickened, discolored, elongated, with subungual debris consistent with onychomycosis.    Scabbed venous ulcer on the anterior right leg. No s/s of infection.  No open lesion associated. No bleeding. No pain to the area. Small scar noted to the plantar right 1st interspace. No s/s of infection. No hyperkeratosis. Irritation erythema to the dorsal right foot.      Assessment: DM2 with left AKA and neuropathy - presenting for a diabetic foot exam.   Onychomycosis.      Plan:   - Pt seen and evaluated  - Nails debrided x 5.  - Cont compression socks.  - Watch area on the right foot. Cover with Duoderm  - See again in 3 months.

## 2019-10-11 NOTE — LETTER
10/11/2019       RE: Supriya Herr  [address]  [facility] [address]     Dear Colleague,    Thank you for referring your patient, Supriya Herr, to the [facility] at [facility]. Please see a copy of my visit note below.    Chief Complaint:   Chief Complaint   Patient presents with     RECHECK     3 month follow up foot care sore on top of foot        Allergies   Allergen Reactions     Penicillins Rash     Unasyn Rash     No evidence SJS, but very uncomfortable and precipitated multiple provider visits. Would not use penicillins again if other options available.        Subjective: Supriya is a 69 year old female who presents to the clinic today for a diabetic foot exam and management. She relates that she has an area of irritation to the top of the right foot from the shoe buckle.      Objective  Non-palpable DP and PT pulses BL.   Equinus noted BL. Pes planus with rigid toe deformities noted to lesser digits on the right. Left AKA noted.   Nails are thickened, discolored, elongated, with subungual debris consistent with onychomycosis.    Scabbed venous ulcer on the anterior right leg. No s/s of infection.  No open lesion associated. No bleeding. No pain to the area. Small scar noted to the plantar right 1st interspace. No s/s of infection. No hyperkeratosis.  Irritation erythema to the dorsal right foot.      Assessment: DM2 with left AKA and neuropathy - presenting for a diabetic foot exam.   Onychomycosis.      Plan:   - Pt seen and evaluated  - Nails debrided x 5.  - Cont compression socks.  - Watch area on the right foot.  Cover with Duoderm  - See again in 3 months.    Again, thank you for allowing me to participate in the care of your patient.      Sincerely,    Eleazar Pack DPM

## 2019-10-11 NOTE — NURSING NOTE
Reason For Visit:   Chief Complaint   Patient presents with     RECHECK     3 month follow up foot care sore on top of foot       There were no vitals taken for this visit.    Pain Assessment  Patient Currently in Pain: Mariano Lu ATC

## 2019-10-16 ENCOUNTER — OFFICE VISIT (OUTPATIENT)
Dept: NEPHROLOGY | Facility: CLINIC | Age: 69
End: 2019-10-16
Payer: MEDICARE

## 2019-10-16 VITALS
TEMPERATURE: 98.1 F | OXYGEN SATURATION: 98 % | DIASTOLIC BLOOD PRESSURE: 78 MMHG | HEART RATE: 65 BPM | SYSTOLIC BLOOD PRESSURE: 162 MMHG

## 2019-10-16 DIAGNOSIS — N18.5 CKD (CHRONIC KIDNEY DISEASE) STAGE 5, GFR LESS THAN 15 ML/MIN (H): ICD-10-CM

## 2019-10-16 DIAGNOSIS — E83.39 HYPERPHOSPHATEMIA: Primary | ICD-10-CM

## 2019-10-16 DIAGNOSIS — D63.1 ANEMIA IN STAGE 5 CHRONIC KIDNEY DISEASE, NOT ON CHRONIC DIALYSIS (H): ICD-10-CM

## 2019-10-16 DIAGNOSIS — D63.1 ANEMIA DUE TO STAGE 5 CHRONIC KIDNEY DISEASE, NOT ON CHRONIC DIALYSIS (H): ICD-10-CM

## 2019-10-16 DIAGNOSIS — N18.5 ANEMIA DUE TO STAGE 5 CHRONIC KIDNEY DISEASE, NOT ON CHRONIC DIALYSIS (H): ICD-10-CM

## 2019-10-16 DIAGNOSIS — N18.5 ANEMIA IN STAGE 5 CHRONIC KIDNEY DISEASE, NOT ON CHRONIC DIALYSIS (H): ICD-10-CM

## 2019-10-16 DIAGNOSIS — J30.1 SEASONAL ALLERGIC RHINITIS DUE TO POLLEN: ICD-10-CM

## 2019-10-16 LAB
ALBUMIN SERPL-MCNC: 2.9 G/DL (ref 3.4–5)
ANION GAP SERPL CALCULATED.3IONS-SCNC: 8 MMOL/L (ref 3–14)
BUN SERPL-MCNC: 88 MG/DL (ref 7–30)
CALCIUM SERPL-MCNC: 8.2 MG/DL (ref 8.5–10.1)
CHLORIDE SERPL-SCNC: 114 MMOL/L (ref 94–109)
CO2 SERPL-SCNC: 20 MMOL/L (ref 20–32)
CREAT SERPL-MCNC: 4.62 MG/DL (ref 0.52–1.04)
FERRITIN SERPL-MCNC: 524 NG/ML (ref 8–252)
GFR SERPL CREATININE-BSD FRML MDRD: 9 ML/MIN/{1.73_M2}
GLUCOSE SERPL-MCNC: 133 MG/DL (ref 70–99)
HGB BLD-MCNC: 8.8 G/DL (ref 11.7–15.7)
IRON SATN MFR SERPL: 21 % (ref 15–46)
IRON SERPL-MCNC: 54 UG/DL (ref 35–180)
PHOSPHATE SERPL-MCNC: 6.1 MG/DL (ref 2.5–4.5)
POTASSIUM SERPL-SCNC: 4.6 MMOL/L (ref 3.4–5.3)
SODIUM SERPL-SCNC: 142 MMOL/L (ref 133–144)
TIBC SERPL-MCNC: 250 UG/DL (ref 240–430)

## 2019-10-16 PROCEDURE — 80069 RENAL FUNCTION PANEL: CPT

## 2019-10-16 PROCEDURE — 82728 ASSAY OF FERRITIN: CPT

## 2019-10-16 PROCEDURE — 36415 COLL VENOUS BLD VENIPUNCTURE: CPT

## 2019-10-16 PROCEDURE — 85018 HEMOGLOBIN: CPT

## 2019-10-16 PROCEDURE — 83550 IRON BINDING TEST: CPT

## 2019-10-16 PROCEDURE — 83540 ASSAY OF IRON: CPT

## 2019-10-16 PROCEDURE — G0463 HOSPITAL OUTPT CLINIC VISIT: HCPCS | Mod: ZF

## 2019-10-16 RX ORDER — FUROSEMIDE 40 MG
80 TABLET ORAL 2 TIMES DAILY
Qty: 180 TABLET | Refills: 3 | Status: SHIPPED | OUTPATIENT
Start: 2019-10-16 | End: 2020-01-14

## 2019-10-16 RX ORDER — SEVELAMER CARBONATE 800 MG/1
800 TABLET, FILM COATED ORAL
Qty: 90 TABLET | Refills: 3 | Status: SHIPPED | OUTPATIENT
Start: 2019-10-16 | End: 2019-11-05

## 2019-10-16 RX ORDER — CETIRIZINE HYDROCHLORIDE 5 MG/1
5 TABLET ORAL DAILY
COMMUNITY
End: 2019-10-16

## 2019-10-16 RX ORDER — FERROUS SULFATE 325(65) MG
325 TABLET ORAL
Qty: 90 TABLET | Refills: 3
Start: 2019-10-16 | End: 2020-01-09

## 2019-10-16 RX ORDER — CETIRIZINE HYDROCHLORIDE 10 MG/1
10 TABLET ORAL DAILY
Qty: 10 TABLET | Refills: 0
Start: 2019-10-16 | End: 2020-01-28

## 2019-10-16 ASSESSMENT — PAIN SCALES - GENERAL: PAINLEVEL: NO PAIN (0)

## 2019-10-16 NOTE — NURSING NOTE
Frequency: every 2 weeks  Most recent or today's HGB: 8.8   Date: 10/16/19  Date of lat dose: Initial dose  HGB associated with last dose given: N/A    Blood Pressure:164/73    Diagnosis: ckd    Ordered by: Ethel Guerrero MD  VIS Offered: yes    Double Checked by: Bety ALTAMIRANO    See MAR for administration details    Pt's first name, last name and  verified prior to medication administration, injection given without complications or questions.       Aly Pérez, EMT

## 2019-10-16 NOTE — LETTER
10/16/2019      RE: Supriya Herr  [address]  [facility] [address]       Nephrology Clinic Visit 10/16/19    Assessment and Plan:     1. CKD5 w/proteinuria- Renal function stable this month. Creat 4.6, eGFR 9 ml/mn, UPCR 6.28 g/gCr (5/19). No uremic symptoms   - Etiology of CKD felt to be DM, HTN, Vascular dz,    - Inactive on transplant wait list as of 4/18. Just has to complete pulmonary w/u to be listed. She is working on this.    - Has been seen by Dr Martin and will need AVG for HD.    - Patient and daughter more accepting of Supriya starting HD. They are pleased that they were able to make their annual mother daughter trip prior to starting HD.    - We have decided that if eGFR is ~ 5 ml/mn, or if feeling unwell, will begin dialysis unit search and schedule her access placement in preparation for HD initiation. She is hoping to be able to find an HD unit where she could run later in the day so she can continue at the day program.    - No need to initiate at this time, no uremic symptoms, lytes, bicarb fine   - Does not use NSAIDs   - Blood pressures still not consistently controlled   - DM with excellent control. A1c 5.5 % 8/19   - On statin for CV risk reduction     2. Volume status - Improved volume status, but still with edema and mild dyspnea. Blood pressures remain uncontrolled. Weight not obtained today. Was 212# on 9/6/19. Some of her volume is third spaced given low albumin of 2.9    - On Lasix 60 mg bid    - Will increase Lasix to 80 mg bid.     - Continue Na restricted diet      3. HTN - Uncontrolled. Clinic blood pressures 160-170/. Day program blood pressures in the 140-150/ range, but no way to evaluate device.   Current regimen:    - lasix 60 bid   - Coreg 25 mg bid   - Amlodipine 5 mg bid       - Will increase Lasix to 80 mg bid.     - Continue home monitoring   - Blood pressure goal < 130/80     4. Electrolytes - No acute concerns. K 4.6, Na 140     5. Acid base - No acute  concerns. Bicarb 20     6. BMD - Ca corrected 9.0, Phos 6.1, Albumin 2.9   - Vit D 27,  ()   - Continue Vit D 2000 U every day, but will hold Calcitriol given recent hypercalcemia   - Start Renvela 800 mg TID with meals.      7. Anemia - Hgb 8.8    - Iron studies 10/19: Ferritin 524, Fe 54, IS 21.    - Has been enrolled in anemia management. Given Darbo 40 mcg today   - Colonoscopy 2018, tubular adenomas     8. DM2 - Well controlled. A1c 5.5 %. On Insulin      9. Depression - Controlled on Zoloft 50 mg every day.       10. Disposition - RTC  6 wks for f/u     Assessment and plan was discussed with patient and she voiced her understanding and agreement.     Reason for Visit:  CKD5/HTN f/u     HPI:  Ms Herr is a 70 yo female with CKD5 and nephrotic range proteinuria, DM2, HTN, in today for routine CKD f/u. Last seen by me on 19. Decision was made to have access placed and begin search for HD unit when GFR is 5.  Furosemide increased to 60 mg bid last visit.       Interval Hx:   No hospital admissions     ROS:   Edema has improved since increasing Furosemide as has her blood pressures, but continues to notice mild dyspnea when bending over.   Day program blood pressures 140-150/.   Keeping her leg elevated more consistently  Has good appetite. Denies chest pain, abdominal pain. No bowel or bladder concerns. Decided to not get a new prosthesis.      Chronic Health Problems:     CKD5  Proteinuria  AKA, left (10/18)  T2DM  Vit D def  HTN  HLD  Anxiety/depression  Vitiligo  Non proliferative diabetic retinopathy  BCC  JONE  Anemia  Prior tobacco abuse     Personal Hx:   Lives in lower level of [facility], daughter Caroline upper level. NS. Attends day program at [facility] Monday through Thursday    Family Hx:   Family History   Problem Relation Age of Onset     Diabetes Mother      Hypertension Mother      Heart Disease Mother          of congestive heart failure     Eye Disorder Mother      Arthritis Mother       Obesity Mother      Heart Disease Father          from CHF     Cerebrovascular Disease Father      Arthritis Father      Musculoskeletal Disorder Other         has MS     Thyroid Disease Other      Eye Disorder Other         cataracts     Cancer Other         throat/liver     Skin Cancer No family hx of      Melanoma No family hx of      Glaucoma No family hx of      Macular Degeneration No family hx of          Allergies:  Allergies   Allergen Reactions     Penicillins Rash     Unasyn Rash     No evidence SJS, but very uncomfortable and precipitated multiple provider visits. Would not use penicillins again if other options available.        Medications:  Current Outpatient Medications   Medication Sig     acetaminophen (TYLENOL) 325 MG tablet Take 2 tablets (650 mg) by mouth every 4 hours as needed for mild pain     albuterol (PROAIR HFA, PROVENTIL HFA, VENTOLIN HFA) 108 (90 BASE) MCG/ACT inhaler Inhale 2 puffs into the lungs every 6 hours as needed for shortness of breath / dyspnea or wheezing     amLODIPine (NORVASC) 5 MG tablet Take 1 tablet (5 mg) by mouth 2 times daily     atorvastatin (LIPITOR) 20 MG tablet TAKE 1 TABLET BY MOUTH ONCE DAILY     blood glucose (ONETOUCH ULTRA) test strip TEST YOUR BLOOD SUGAR 3-4 TIMES PER DAY.     carvedilol (COREG) 25 MG tablet Take 1 tablet (25 mg) by mouth 2 times daily (with meals)     cetirizine (ZYRTEC) 10 MG tablet Take 1 tablet (10 mg) by mouth daily     clindamycin (CLINDAMAX) 1 % topical gel Apply topically 2 times daily     ferrous sulfate (FEROSUL) 325 (65 Fe) MG tablet Take 1 tablet (325 mg) by mouth daily (with breakfast)     furosemide (LASIX) 40 MG tablet Take 2 tablets (80 mg) by mouth 2 times daily     insulin aspart (NOVOLOG FLEXPEN) 100 UNIT/ML pen INJECT 4 UNITS SUBCUTANEOUSLY WITH BREAKFAST, LUNCH AND DINNER.     insulin glargine (BASAGLAR KWIKPEN) 100 UNIT/ML pen Inject 22 Units Subcutaneous At Bedtime Inject 22 U SubCut at HS     insulin pen  "needle (ULTICARE MINI) 31G X 6 MM Use daily or as directed.     order for DME Compression socks - knee  15-20 mmHg  Open toed.  Use daily.     order for DME Equipment being ordered: mattress overlay for hospital bed  Wt. 192#  Height 5'5\"  99 months/Lifetime     order for DME Equipment being ordered: Mattress Overlay for Hospital Bed. Height 65. Weight 168 lbs.  Length of need: Lifetime     order for DME 1 SAD light     order for DME 1 wheelchair     sertraline (ZOLOFT) 50 MG tablet Take 1 tablet (50 mg) by mouth daily     sevelamer (RENVELA) 800 MG tablet Take 1 tablet (800 mg) by mouth 3 times daily (with meals)     triamcinolone (KENALOG) 0.1 % external cream Apply to sites of dermatitis- the abdomen, armpits, groin as needed.     vitamin D3 (CHOLECALCIFEROL) 2000 units (50 mcg) tablet Take 1 tablet (2,000 Units) by mouth daily     Current Facility-Administered Medications   Medication     darbepoetin cadny-polysorbate (ARANESP) injection 40 mcg      Vitals:  B/P 162-174/70-78  P 65    Exam:  GEN: Pleasant female in NAD. Daughter present.  CARDIAC: RRR  LUNGS: CTA  ABDOMEN: Soft NT  EXT: Left LORI, Right 1+ edema  NEURO: alert. Oriented. No asterixis    LABS:   CMP  Recent Labs   Lab Test 10/16/19  1513 09/27/19  1220 09/13/19  1508 08/07/19  1504    143 142 140   POTASSIUM 4.6 4.8 5.1 4.4   CHLORIDE 114* 114* 113* 112*   CO2 20 22 23 22   ANIONGAP 8 7 6 6   * 110* 170* 243*   BUN 88* 98* 80* 92*   CR 4.62* 5.20* 4.58* 4.85*   GFRESTIMATED 9* 8* 9* 8*   GFRESTBLACK 10* 9* 10* 10*   JODY 8.2* 8.7 8.9 8.2*     Recent Labs   Lab Test 07/31/18  1148 07/24/18  0900 07/17/18  0830 06/24/18  0623 06/23/18  2347 03/29/18  1410 11/13/17  0715   BILITOTAL  --   --   --  0.2 0.2 0.2 0.3   ALKPHOS  --   --   --  49 56 56 70   ALT  --   --   --  17 13 15 37   AST 24 24 12 16 12 14 30     CBC  Recent Labs   Lab Test 10/16/19  1513 09/27/19  1220 09/13/19  1508 08/07/19  1504 07/09/19  1513  02/15/19  1459 " 01/18/19  1457 12/07/18  1411   HGB 8.8* 8.7* 8.9* 9.3* 9.3*   < > 10.3* 10.2* 10.3*   WBC  --   --   --   --  6.3  --  5.5 5.4 5.7   RBC  --   --   --   --  3.07*  --  3.40* 3.47* 3.47*   HCT  --  27.8*  --   --  29.7*  --  31.6* 32.4* 32.3*   MCV  --   --   --   --  97  --  93 93 93   MCH  --   --   --   --  30.3  --  30.3 29.4 29.7   MCHC  --   --   --   --  31.3*  --  32.6 31.5 31.9   RDW  --   --   --   --  12.9  --  12.5 12.4 12.4   PLT  --   --   --   --  202  --  232 222 237    < > = values in this interval not displayed.     URINE STUDIES  Recent Labs   Lab Test 03/29/18  1448 01/25/18  0233 11/02/17  0602 10/26/16  1220  12/05/12  1541   COLOR Straw Light Yellow Light Yellow Yellow   < > Yellow   APPEARANCE Clear Clear Clear Slightly Cloudy   < > Clear   URINEGLC 50* Negative 300* >500*   < > 100*   URINEBILI Negative Negative Negative Negative   < > Negative   URINEKETONE Negative Negative Negative Negative   < > Negative   SG 1.008 1.007 1.010 1.014   < > 1.025   UBLD Negative Negative Negative Negative   < > Small*   URINEPH 5.0 6.5 7.5* 6.0   < > 6.0   PROTEIN >499* 100* >600* >500*   < > 100*   UROBILINOGEN  --   --   --   --   --  0.2   NITRITE Negative Negative Negative Negative   < > Negative   LEUKEST Negative Negative Small* Small*   < > Negative   RBCU 1 0 1 5*   < >  --    WBCU 2 1 44* 54*   < >  --     < > = values in this interval not displayed.     Recent Labs   Lab Test 05/24/19  1420 12/07/18  1417 11/01/18  1533 05/24/18  1441   UTPG 6.28* 5.60* 6.71* 5.97*     PTH  Recent Labs   Lab Test 01/18/19  1457 10/17/18  1413 05/24/18  1435   PTHI 126* 127* 55     IRON STUDIES  Recent Labs   Lab Test 10/16/19  1513 09/27/19  1220 08/07/19  1504   IRON 54 52 61    237* 226*   IRONSAT 21 22 27   ELENA 524* 557* 543*       Silva Guerrero, APRN, CNP

## 2019-10-16 NOTE — NURSING NOTE
"Chief Complaint   Patient presents with     RECHECK     Follow Up CKD       Vital signs:  Temp: 98.1  F (36.7  C)   BP: (!) 162/78 Pulse: 65     SpO2: 98 %          Estimated body mass index is 33.25 kg/m  as calculated from the following:    Height as of 9/6/19: 1.702 m (5' 7\").    Weight as of 9/6/19: 96.3 kg (212 lb 4.8 oz).      Bety Abreu, CMA    "

## 2019-10-17 ENCOUNTER — TELEPHONE (OUTPATIENT)
Dept: PHARMACY | Facility: CLINIC | Age: 69
End: 2019-10-17

## 2019-10-17 NOTE — TELEPHONE ENCOUNTER
Anemia Management Note  SUBJECTIVE/OBJECTIVE:  Referred by Ethel Guerrero NP on 2020  Primary Diagnosis: Anemia in Chronic Kidney Disease (N18.5, D63.1)     Secondary Diagnosis:  Chronic Kidney Disease, Stage 5 (N18.5)  Hgb goal range:  9-10   Epo/Darbo: Aranesp 40mcg every 14 days for Hgb <10. In Clinic  Iron regimen:  Ferrous Sulfate daily. (19)  Labs : 2020  Recent KELLIE use, transfusion, IV iron: Unknown  RX/TX plans : TBD  No history of stroke and blood clots.  Hx of HTN, CHF and Basal Cell Carcinoma ()        Contact:            Ok to leave message regarding Medical, Billing and Scheduling information per consent to communicate dated 10/19/2015                              OK to speak with Caroline Sandoval (daughter) and Caroline Baird (sister) regarding Medical, Billing and Scheduling information per consent to communicate dated 10/19/2015    Anemia Latest Ref Rng & Units 2/15/2019 2019 2019 2019 2019 2019 10/16/2019   KELLIE Dose - - - - - - - 40mcg   Hemoglobin 11.7 - 15.7 g/dL 10.3(L) 8.2(L) 9.3(L) 9.3(L) 8.9(L) 8.7(L) 8.8(L)   TSAT 15 - 46 % 24 15 23 27 - 22 21   Ferritin 8 - 252 ng/mL 316(H) 298(H) 673(H) 543(H) - 557(H) 524(H)     BP Readings from Last 3 Encounters:   10/16/19 (!) 162/78   19 (!) 170/66   19 (!) 152/62     Wt Readings from Last 2 Encounters:   19 96.3 kg (212 lb 4.8 oz)   19 87.4 kg (192 lb 9.6 oz)           ASSESSMENT:  Hgb:Not at goal/Initiation of therapy  TSat: not at goal (>30%) but ferritin >500ng/mL.  IV iron not indicated at this time per anemia protocol. Ferritin: At goal (>100ng/mL)    PLAN:  Dose with aranesp and RTC for hgb then aranesp if needed in 2 week(s)    Orders needed to be renewed (for next follow-up date) in EPIC: None    Iron labs due:  2019    Plan discussed with:  Caroline  Plan provided by:  mingo    NEXT FOLLOW-UP DATE:  10/31/2019    Anemia Management Service  Leslie Rivas RN  Phone:  [phone]  Fax: [phone]

## 2019-10-18 NOTE — PROGRESS NOTES
Nephrology Clinic Visit 10/16/19    Assessment and Plan:     1. CKD5 w/proteinuria- Renal function stable this month. Creat 4.6, eGFR 9 ml/mn, UPCR 6.28 g/gCr (5/19). No uremic symptoms   - Etiology of CKD felt to be DM, HTN, Vascular dz,    - Inactive on transplant wait list as of 4/18. Just has to complete pulmonary w/u to be listed. She is working on this.    - Has been seen by Dr Martin and will need AVG for HD.    - Patient and daughter more accepting of Supriya starting HD. They are pleased that they were able to make their annual mother daughter trip prior to starting HD.    - We have decided that if eGFR is ~ 5 ml/mn, or if feeling unwell, will begin dialysis unit search and schedule her access placement in preparation for HD initiation. She is hoping to be able to find an HD unit where she could run later in the day so she can continue at the day program.    - No need to initiate at this time, no uremic symptoms, lytes, bicarb fine   - Does not use NSAIDs   - Blood pressures still not consistently controlled   - DM with excellent control. A1c 5.5 % 8/19   - On statin for CV risk reduction     2. Volume status - Improved volume status, but still with edema and mild dyspnea. Blood pressures remain uncontrolled. Weight not obtained today. Was 212# on 9/6/19. Some of her volume is third spaced given low albumin of 2.9    - On Lasix 60 mg bid    - Will increase Lasix to 80 mg bid.     - Continue Na restricted diet      3. HTN - Uncontrolled. Clinic blood pressures 160-170/. Day program blood pressures in the 140-150/ range, but no way to evaluate device.   Current regimen:    - lasix 60 bid   - Coreg 25 mg bid   - Amlodipine 5 mg bid       - Will increase Lasix to 80 mg bid.     - Continue home monitoring   - Blood pressure goal < 130/80     4. Electrolytes - No acute concerns. K 4.6, Na 140     5. Acid base - No acute concerns. Bicarb 20     6. BMD - Ca corrected 9.0, Phos 6.1, Albumin 2.9   - Vit D 27, PTH  126 ()   - Continue Vit D 2000 U every day, but will hold Calcitriol given recent hypercalcemia   - Start Renvela 800 mg TID with meals.      7. Anemia - Hgb 8.8    - Iron studies 10/19: Ferritin 524, Fe 54, IS 21.    - Has been enrolled in anemia management. Given Darbo 40 mcg today   - Colonoscopy 2018, tubular adenomas     8. DM2 - Well controlled. A1c 5.5 %. On Insulin      9. Depression - Controlled on Zoloft 50 mg every day.       10. Disposition - RTC 6 wks for f/u     Assessment and plan was discussed with patient and she voiced her understanding and agreement.     Reason for Visit:  CKD5/HTN f/u     HPI:  Ms Herr is a 68 yo female with CKD5 and nephrotic range proteinuria, DM2, HTN, in today for routine CKD f/u. Last seen by me on 19. Decision was made to have access placed and begin search for HD unit when GFR is 5.  Furosemide increased to 60 mg bid last visit.       Interval Hx:   No hospital admissions     ROS:   Edema has improved since increasing Furosemide as has her blood pressures, but continues to notice mild dyspnea when bending over.   Day program blood pressures 140-150/.   Keeping her leg elevated more consistently  Has good appetite. Denies chest pain, abdominal pain. No bowel or bladder concerns. Decided to not get a new prosthesis.      Chronic Health Problems:     CKD5  Proteinuria  AKA, left (10/18)  T2DM  Vit D def  HTN  HLD  Anxiety/depression  Vitiligo  Non proliferative diabetic retinopathy  BCC  JONE  Anemia  Prior tobacco abuse     Personal Hx:   Lives in lower level of [facility], daughter Caroline upper level. NS. Attends day program at [facility] Monday through Thursday    Family Hx:   Family History   Problem Relation Age of Onset     Diabetes Mother      Hypertension Mother      Heart Disease Mother          of congestive heart failure     Eye Disorder Mother      Arthritis Mother      Obesity Mother      Heart Disease Father          from CHF     Cerebrovascular  Disease Father      Arthritis Father      Musculoskeletal Disorder Other         has MS     Thyroid Disease Other      Eye Disorder Other         cataracts     Cancer Other         throat/liver     Skin Cancer No family hx of      Melanoma No family hx of      Glaucoma No family hx of      Macular Degeneration No family hx of          Allergies:  Allergies   Allergen Reactions     Penicillins Rash     Unasyn Rash     No evidence SJS, but very uncomfortable and precipitated multiple provider visits. Would not use penicillins again if other options available.        Medications:  Current Outpatient Medications   Medication Sig     acetaminophen (TYLENOL) 325 MG tablet Take 2 tablets (650 mg) by mouth every 4 hours as needed for mild pain     albuterol (PROAIR HFA, PROVENTIL HFA, VENTOLIN HFA) 108 (90 BASE) MCG/ACT inhaler Inhale 2 puffs into the lungs every 6 hours as needed for shortness of breath / dyspnea or wheezing     amLODIPine (NORVASC) 5 MG tablet Take 1 tablet (5 mg) by mouth 2 times daily     atorvastatin (LIPITOR) 20 MG tablet TAKE 1 TABLET BY MOUTH ONCE DAILY     blood glucose (ONETOUCH ULTRA) test strip TEST YOUR BLOOD SUGAR 3-4 TIMES PER DAY.     carvedilol (COREG) 25 MG tablet Take 1 tablet (25 mg) by mouth 2 times daily (with meals)     cetirizine (ZYRTEC) 10 MG tablet Take 1 tablet (10 mg) by mouth daily     clindamycin (CLINDAMAX) 1 % topical gel Apply topically 2 times daily     ferrous sulfate (FEROSUL) 325 (65 Fe) MG tablet Take 1 tablet (325 mg) by mouth daily (with breakfast)     furosemide (LASIX) 40 MG tablet Take 2 tablets (80 mg) by mouth 2 times daily     insulin aspart (NOVOLOG FLEXPEN) 100 UNIT/ML pen INJECT 4 UNITS SUBCUTANEOUSLY WITH BREAKFAST, LUNCH AND DINNER.     insulin glargine (BASAGLAR KWIKPEN) 100 UNIT/ML pen Inject 22 Units Subcutaneous At Bedtime Inject 22 U SubCut at HS     insulin pen needle (ULTICARE MINI) 31G X 6 MM Use daily or as directed.     order for DME Compression  "socks - knee  15-20 mmHg  Open toed.  Use daily.     order for DME Equipment being ordered: mattress overlay for hospital bed  Wt. 192#  Height 5'5\"  99 months/Lifetime     order for DME Equipment being ordered: Mattress Overlay for Hospital Bed. Height 65. Weight 168 lbs.  Length of need: Lifetime     order for DME 1 SAD light     order for DME 1 wheelchair     sertraline (ZOLOFT) 50 MG tablet Take 1 tablet (50 mg) by mouth daily     sevelamer (RENVELA) 800 MG tablet Take 1 tablet (800 mg) by mouth 3 times daily (with meals)     triamcinolone (KENALOG) 0.1 % external cream Apply to sites of dermatitis- the abdomen, armpits, groin as needed.     vitamin D3 (CHOLECALCIFEROL) 2000 units (50 mcg) tablet Take 1 tablet (2,000 Units) by mouth daily     Current Facility-Administered Medications   Medication     darbepoetin candy-polysorbate (ARANESP) injection 40 mcg      Vitals:  B/P 162-174/70-78  P 65    Exam:  GEN: Pleasant female in NAD. Daughter present.  CARDIAC: RRR  LUNGS: CTA  ABDOMEN: Soft NT  EXT: Left LORI, Right 1+ edema  NEURO: alert. Oriented. No asterixis    LABS:   CMP  Recent Labs   Lab Test 10/16/19  1513 09/27/19  1220 09/13/19  1508 08/07/19  1504    143 142 140   POTASSIUM 4.6 4.8 5.1 4.4   CHLORIDE 114* 114* 113* 112*   CO2 20 22 23 22   ANIONGAP 8 7 6 6   * 110* 170* 243*   BUN 88* 98* 80* 92*   CR 4.62* 5.20* 4.58* 4.85*   GFRESTIMATED 9* 8* 9* 8*   GFRESTBLACK 10* 9* 10* 10*   JODY 8.2* 8.7 8.9 8.2*     Recent Labs   Lab Test 07/31/18  1148 07/24/18  0900 07/17/18  0830 06/24/18  0623 06/23/18  2347 03/29/18  1410 11/13/17  0715   BILITOTAL  --   --   --  0.2 0.2 0.2 0.3   ALKPHOS  --   --   --  49 56 56 70   ALT  --   --   --  17 13 15 37   AST 24 24 12 16 12 14 30     CBC  Recent Labs   Lab Test 10/16/19  1513 09/27/19  1220 09/13/19  1508 08/07/19  1504 07/09/19  1513  02/15/19  1459 01/18/19  1457 12/07/18  1411   HGB 8.8* 8.7* 8.9* 9.3* 9.3*   < > 10.3* 10.2* 10.3*   WBC  --   --   " --   --  6.3  --  5.5 5.4 5.7   RBC  --   --   --   --  3.07*  --  3.40* 3.47* 3.47*   HCT  --  27.8*  --   --  29.7*  --  31.6* 32.4* 32.3*   MCV  --   --   --   --  97  --  93 93 93   MCH  --   --   --   --  30.3  --  30.3 29.4 29.7   MCHC  --   --   --   --  31.3*  --  32.6 31.5 31.9   RDW  --   --   --   --  12.9  --  12.5 12.4 12.4   PLT  --   --   --   --  202  --  232 222 237    < > = values in this interval not displayed.     URINE STUDIES  Recent Labs   Lab Test 03/29/18  1448 01/25/18  0233 11/02/17  0602 10/26/16  1220  12/05/12  1541   COLOR Straw Light Yellow Light Yellow Yellow   < > Yellow   APPEARANCE Clear Clear Clear Slightly Cloudy   < > Clear   URINEGLC 50* Negative 300* >500*   < > 100*   URINEBILI Negative Negative Negative Negative   < > Negative   URINEKETONE Negative Negative Negative Negative   < > Negative   SG 1.008 1.007 1.010 1.014   < > 1.025   UBLD Negative Negative Negative Negative   < > Small*   URINEPH 5.0 6.5 7.5* 6.0   < > 6.0   PROTEIN >499* 100* >600* >500*   < > 100*   UROBILINOGEN  --   --   --   --   --  0.2   NITRITE Negative Negative Negative Negative   < > Negative   LEUKEST Negative Negative Small* Small*   < > Negative   RBCU 1 0 1 5*   < >  --    WBCU 2 1 44* 54*   < >  --     < > = values in this interval not displayed.     Recent Labs   Lab Test 05/24/19  1420 12/07/18  1417 11/01/18  1533 05/24/18  1441   UTPG 6.28* 5.60* 6.71* 5.97*     PTH  Recent Labs   Lab Test 01/18/19  1457 10/17/18  1413 05/24/18  1435   PTHI 126* 127* 55     IRON STUDIES  Recent Labs   Lab Test 10/16/19  1513 09/27/19  1220 08/07/19  1504   IRON 54 52 61    237* 226*   IRONSAT 21 22 27   ELENA 524* 557* 543*       Silva Guerrero, APRN, CNP

## 2019-10-24 ENCOUNTER — OFFICE VISIT (OUTPATIENT)
Dept: OPHTHALMOLOGY | Facility: CLINIC | Age: 69
End: 2019-10-24
Attending: OPHTHALMOLOGY
Payer: MEDICARE

## 2019-10-24 DIAGNOSIS — E11.3213 TYPE 2 DIABETES MELLITUS WITH MILD NONPROLIFERATIVE RETINOPATHY OF BOTH EYES AND MACULAR EDEMA, UNSPECIFIED WHETHER LONG TERM INSULIN USE (H): Primary | ICD-10-CM

## 2019-10-24 PROCEDURE — G0463 HOSPITAL OUTPT CLINIC VISIT: HCPCS | Mod: ZF

## 2019-10-24 PROCEDURE — 92134 CPTRZ OPH DX IMG PST SGM RTA: CPT | Mod: ZF | Performed by: OPHTHALMOLOGY

## 2019-10-24 ASSESSMENT — REFRACTION_WEARINGRX
OD_ADD: +2.75
OD_SPHERE: -4.75
OS_CYLINDER: +2.00
OD_CYLINDER: +2.25
OS_AXIS: 134
SPECS_TYPE: BIFOCAL
OS_ADD: +2.75
OS_SPHERE: -3.50
OD_AXIS: 093

## 2019-10-24 ASSESSMENT — TONOMETRY
IOP_METHOD: TONOPEN
OS_IOP_MMHG: 8
OD_IOP_MMHG: 9

## 2019-10-24 ASSESSMENT — CONF VISUAL FIELD
OD_NORMAL: 1
METHOD: COUNTING FINGERS
OS_NORMAL: 1

## 2019-10-24 ASSESSMENT — VISUAL ACUITY
METHOD: SNELLEN - LINEAR
OS_PH_CC: 20/40
OS_CC+: -1
OD_CC+: -2
OD_PH_CC: 20/50
OD_CC: 20/50
OD_PH_CC+: +2
CORRECTION_TYPE: GLASSES
OS_CC: 20/50

## 2019-10-24 ASSESSMENT — CUP TO DISC RATIO
OS_RATIO: 0.3
OD_RATIO: 0.3

## 2019-10-24 ASSESSMENT — SLIT LAMP EXAM - LIDS
COMMENTS: NORMAL
COMMENTS: NORMAL

## 2019-10-24 NOTE — NURSING NOTE
Chief Complaints and History of Present Illnesses   Patient presents with     Follow Up     Type 2 diabetes mellitus with mild nonproliferative retinopathy of both eyes and macular edema, unspecified whether long term insulin use      Chief Complaint(s) and History of Present Illness(es)     Follow Up     Associated symptoms: tearing (At night.) and floaters.  Negative for eye pain, dryness and flashes    Comments: Type 2 diabetes mellitus with mild nonproliferative retinopathy of both eyes and macular edema, unspecified whether long term insulin use               Comments     Pt states that VA in RE is better and LE is stable since last visit.  Pt has no pain or other concerns at this time.    DM 2.  BS were 104 this afternoon.  Lab Results       Component                Value               Date                       A1C                      6.0                 06/22/2018                 A1C                      5.4                 03/29/2018                 A1C                      6.8                 01/09/2018                 A1C                      9.6                 06/09/2017                 A1C                      6.9                 02/14/2017              TRINA Go October 24, 2019 3:23 PM

## 2019-10-28 ENCOUNTER — TELEPHONE (OUTPATIENT)
Dept: OPHTHALMOLOGY | Facility: CLINIC | Age: 69
End: 2019-10-28

## 2019-10-28 NOTE — TELEPHONE ENCOUNTER
Last glasses Rx from July 2019 mailed per request  Yrn Hennessy RN RN 7:19 AM 10/29/19        [facility]    Phone Message    May a detailed message be left on voicemail: yes    Reason for Call: Other: Caroline is pts daughter wanting to know from Dr. Leanne Jett if at the last visit they checked pts eye for new glasses? Did pt get a prescription for glasses? Wasn't in pts aftervisit papers. If not can she get an eye glass prescription sent to her. Please call Caroline back at [phone], thank you     Action Taken: Message routed to:  [facility] ([facility] Eye

## 2019-10-30 ENCOUNTER — ALLIED HEALTH/NURSE VISIT (OUTPATIENT)
Dept: TRANSPLANT | Facility: CLINIC | Age: 69
End: 2019-10-30
Payer: MEDICARE

## 2019-10-30 VITALS — DIASTOLIC BLOOD PRESSURE: 65 MMHG | HEART RATE: 76 BPM | SYSTOLIC BLOOD PRESSURE: 164 MMHG

## 2019-10-30 DIAGNOSIS — N18.5 ANEMIA IN STAGE 5 CHRONIC KIDNEY DISEASE, NOT ON CHRONIC DIALYSIS (H): Primary | ICD-10-CM

## 2019-10-30 DIAGNOSIS — N18.5 ANEMIA OF CHRONIC RENAL FAILURE, STAGE 5 (H): ICD-10-CM

## 2019-10-30 DIAGNOSIS — N18.5 CKD (CHRONIC KIDNEY DISEASE) STAGE 5, GFR LESS THAN 15 ML/MIN (H): ICD-10-CM

## 2019-10-30 DIAGNOSIS — D63.1 ANEMIA OF CHRONIC RENAL FAILURE, STAGE 5 (H): ICD-10-CM

## 2019-10-30 DIAGNOSIS — D63.1 ANEMIA IN STAGE 5 CHRONIC KIDNEY DISEASE, NOT ON CHRONIC DIALYSIS (H): Primary | ICD-10-CM

## 2019-10-30 LAB
ALBUMIN SERPL-MCNC: 2.9 G/DL (ref 3.4–5)
ANION GAP SERPL CALCULATED.3IONS-SCNC: 10 MMOL/L (ref 3–14)
BUN SERPL-MCNC: 106 MG/DL (ref 7–30)
CALCIUM SERPL-MCNC: 7.7 MG/DL (ref 8.5–10.1)
CHLORIDE SERPL-SCNC: 110 MMOL/L (ref 94–109)
CO2 SERPL-SCNC: 21 MMOL/L (ref 20–32)
CREAT SERPL-MCNC: 5.1 MG/DL (ref 0.52–1.04)
FERRITIN SERPL-MCNC: 412 NG/ML (ref 8–252)
GFR SERPL CREATININE-BSD FRML MDRD: 8 ML/MIN/{1.73_M2}
GLUCOSE SERPL-MCNC: 151 MG/DL (ref 70–99)
HCT VFR BLD AUTO: 27.9 % (ref 35–47)
HGB BLD-MCNC: 9 G/DL (ref 11.7–15.7)
IRON SATN MFR SERPL: 24 % (ref 15–46)
IRON SERPL-MCNC: 54 UG/DL (ref 35–180)
PHOSPHATE SERPL-MCNC: 7.4 MG/DL (ref 2.5–4.5)
POTASSIUM SERPL-SCNC: 4.5 MMOL/L (ref 3.4–5.3)
SODIUM SERPL-SCNC: 142 MMOL/L (ref 133–144)
TIBC SERPL-MCNC: 221 UG/DL (ref 240–430)

## 2019-10-30 PROCEDURE — 40000269 ZZH STATISTIC NO CHARGE FACILITY FEE: Mod: ZF

## 2019-10-30 PROCEDURE — 36415 COLL VENOUS BLD VENIPUNCTURE: CPT

## 2019-10-30 PROCEDURE — 25000128 H RX IP 250 OP 636: Mod: ZF

## 2019-10-30 PROCEDURE — 82728 ASSAY OF FERRITIN: CPT

## 2019-10-30 PROCEDURE — 96372 THER/PROPH/DIAG INJ SC/IM: CPT | Mod: ZF

## 2019-10-30 PROCEDURE — 85018 HEMOGLOBIN: CPT

## 2019-10-30 PROCEDURE — 80069 RENAL FUNCTION PANEL: CPT

## 2019-10-30 PROCEDURE — 83540 ASSAY OF IRON: CPT

## 2019-10-30 PROCEDURE — 83550 IRON BINDING TEST: CPT

## 2019-10-30 PROCEDURE — 85014 HEMATOCRIT: CPT

## 2019-10-30 RX ADMIN — DARBEPOETIN ALFA 40 MCG: 40 INJECTION, SOLUTION INTRAVENOUS; SUBCUTANEOUS at 15:15

## 2019-10-30 NOTE — NURSING NOTE
Frequency: Every 14 days  Most recent or today's HGB: 9   Date: 10/30/2019  Date of lat dose: 10/16/19  HGB associated with last dose given: 8.8    Blood Pressure: 164/65    Diagnosis: Anemia in CKD stage V    Ordered by: Silva Guerrero NP  VIS Offered: Yes    Double Checked by: Lorie MONROE CMA    See MAR for administration details    Pt's first name, last name and  verified prior to medication administration, injection given without complications or questions.

## 2019-10-31 ENCOUNTER — TELEPHONE (OUTPATIENT)
Dept: PHARMACY | Facility: CLINIC | Age: 69
End: 2019-10-31

## 2019-10-31 NOTE — TELEPHONE ENCOUNTER
Anemia Management Note  SUBJECTIVE/OBJECTIVE:  Referred by Ethel Guerrero NP on 2020  Primary Diagnosis: Anemia in Chronic Kidney Disease (N18.5, D63.1)     Secondary Diagnosis:  Chronic Kidney Disease, Stage 5 (N18.5)  Hgb goal range:  9-10   Epo/Darbo: Aranesp 40mcg every 14 days for Hgb <10. In Clinic  Iron regimen:  Ferrous Sulfate daily. (19)  Labs : 2020  Recent KELLIE use, transfusion, IV iron: Unknown  RX/TX plans : TBD  No history of stroke and blood clots.  Hx of HTN, CHF and Basal Cell Carcinoma ()        Contact:            Ok to leave message regarding Medical, Billing and Scheduling information per consent to communicate dated 10/19/2015                              OK to speak with Caroline Sandoval (daughter) and Caroline Baird (sister) regarding Medical, Billing and Scheduling information per consent to communicate dated 10/19/2015    Anemia Latest Ref Rng & Units 2019 2019 2019 2019 2019 10/16/2019 10/30/2019   KELLIE Dose - - - - - - 40mcg 40mcg   Hemoglobin 11.7 - 15.7 g/dL 8.2(L) 9.3(L) 9.3(L) 8.9(L) 8.7(L) 8.8(L) 9.0(L)   TSAT 15 - 46 % 15 23 27 - 22 21 24   Ferritin 8 - 252 ng/mL 298(H) 673(H) 543(H) - 557(H) 524(H) 412(H)     BP Readings from Last 3 Encounters:   10/30/19 (!) 164/65   10/16/19 (!) 162/78   19 (!) 170/66     Wt Readings from Last 2 Encounters:   19 96.3 kg (212 lb 4.8 oz)   19 87.4 kg (192 lb 9.6 oz)           ASSESSMENT:  Hgb:At goal - recommend dose  TSat: not at goal of >30% Ferritin: At goal (>100ng/mL)    PLAN:  Dose with aranesp and RTC for hgb then aranesp if needed in 2 week(s)    Orders needed to be renewed (for next follow-up date) in EPIC: None    Iron labs due:  2019    Plan discussed with:  No call made, appt scheduled for 2019  Plan provided by:  mingo    NEXT FOLLOW-UP DATE:  2019    Anemia Management Service  Leslie Rivas RN  Phone: [phone]  Fax: [phone]

## 2019-11-02 ENCOUNTER — HEALTH MAINTENANCE LETTER (OUTPATIENT)
Age: 69
End: 2019-11-02

## 2019-11-04 ENCOUNTER — CARE COORDINATION (OUTPATIENT)
Dept: NEPHROLOGY | Facility: CLINIC | Age: 69
End: 2019-11-04

## 2019-11-04 DIAGNOSIS — E83.39 HYPERPHOSPHATEMIA: Primary | ICD-10-CM

## 2019-11-04 NOTE — PROGRESS NOTES
Received message from patient's daughter that Renvela 800 mg tablets is not covered by patient's insurance. Called pharmacy and they said patient's insurance prefers the powder form of Renvela. Message sent to Ethel to review and advise if okay to change to powder form.    Pravin Cordova RN

## 2019-11-05 RX ORDER — SEVELAMER CARBONATE FOR ORAL SUSPENSION 800 MG/1
0.8 POWDER, FOR SUSPENSION ORAL
Qty: 90 PACKET | Refills: 3 | Status: SHIPPED | OUTPATIENT
Start: 2019-11-05 | End: 2020-07-09 | Stop reason: ALTCHOICE

## 2019-11-05 NOTE — PROGRESS NOTES
Received order from Ethel Dowd NP to change Renvela rx to the powder form. 0.8 G packet TID with meals.  Rx sent. Updated patient's daughter, Caroline.    Pravin Cordova RN

## 2019-11-06 ENCOUNTER — ALLIED HEALTH/NURSE VISIT (OUTPATIENT)
Dept: NURSING | Facility: CLINIC | Age: 69
End: 2019-11-06
Payer: MEDICARE

## 2019-11-06 DIAGNOSIS — Z23 NEED FOR PROPHYLACTIC VACCINATION AND INOCULATION AGAINST INFLUENZA: Primary | ICD-10-CM

## 2019-11-06 PROCEDURE — G0008 ADMIN INFLUENZA VIRUS VAC: HCPCS

## 2019-11-06 PROCEDURE — 99207 ZZC NO CHARGE NURSE ONLY: CPT

## 2019-11-06 PROCEDURE — 90662 IIV NO PRSV INCREASED AG IM: CPT

## 2019-11-12 ENCOUNTER — ALLIED HEALTH/NURSE VISIT (OUTPATIENT)
Dept: TRANSPLANT | Facility: CLINIC | Age: 69
End: 2019-11-12
Attending: INTERNAL MEDICINE
Payer: MEDICARE

## 2019-11-12 ENCOUNTER — ANCILLARY PROCEDURE (OUTPATIENT)
Dept: CT IMAGING | Facility: CLINIC | Age: 69
End: 2019-11-12
Attending: INTERNAL MEDICINE
Payer: MEDICARE

## 2019-11-12 ENCOUNTER — OFFICE VISIT (OUTPATIENT)
Dept: PULMONOLOGY | Facility: CLINIC | Age: 69
End: 2019-11-12
Attending: INTERNAL MEDICINE
Payer: MEDICARE

## 2019-11-12 VITALS
OXYGEN SATURATION: 98 % | TEMPERATURE: 98 F | SYSTOLIC BLOOD PRESSURE: 167 MMHG | HEART RATE: 60 BPM | DIASTOLIC BLOOD PRESSURE: 75 MMHG | RESPIRATION RATE: 16 BRPM

## 2019-11-12 DIAGNOSIS — R91.8 PULMONARY NODULES: ICD-10-CM

## 2019-11-12 DIAGNOSIS — D63.1 ANEMIA IN STAGE 5 CHRONIC KIDNEY DISEASE, NOT ON CHRONIC DIALYSIS (H): Primary | ICD-10-CM

## 2019-11-12 DIAGNOSIS — N18.5 ANEMIA OF CHRONIC RENAL FAILURE, STAGE 5 (H): ICD-10-CM

## 2019-11-12 DIAGNOSIS — R91.8 PULMONARY NODULES: Primary | ICD-10-CM

## 2019-11-12 DIAGNOSIS — D63.1 ANEMIA OF CHRONIC RENAL FAILURE, STAGE 5 (H): ICD-10-CM

## 2019-11-12 DIAGNOSIS — N18.5 CKD (CHRONIC KIDNEY DISEASE) STAGE 5, GFR LESS THAN 15 ML/MIN (H): ICD-10-CM

## 2019-11-12 DIAGNOSIS — N18.5 ANEMIA IN STAGE 5 CHRONIC KIDNEY DISEASE, NOT ON CHRONIC DIALYSIS (H): Primary | ICD-10-CM

## 2019-11-12 LAB
FERRITIN SERPL-MCNC: 518 NG/ML (ref 8–252)
HCT VFR BLD AUTO: 32.6 % (ref 35–47)
HGB BLD-MCNC: 10.7 G/DL (ref 11.7–15.7)
IRON SATN MFR SERPL: 28 % (ref 15–46)
IRON SERPL-MCNC: 63 UG/DL (ref 35–180)
TIBC SERPL-MCNC: 227 UG/DL (ref 240–430)

## 2019-11-12 PROCEDURE — 83540 ASSAY OF IRON: CPT

## 2019-11-12 PROCEDURE — 83550 IRON BINDING TEST: CPT

## 2019-11-12 PROCEDURE — 85014 HEMATOCRIT: CPT

## 2019-11-12 PROCEDURE — 40000269 ZZH STATISTIC NO CHARGE FACILITY FEE: Mod: ZF

## 2019-11-12 PROCEDURE — G0463 HOSPITAL OUTPT CLINIC VISIT: HCPCS | Mod: ZF

## 2019-11-12 PROCEDURE — 85018 HEMOGLOBIN: CPT

## 2019-11-12 PROCEDURE — 82728 ASSAY OF FERRITIN: CPT

## 2019-11-12 ASSESSMENT — PAIN SCALES - GENERAL: PAINLEVEL: NO PAIN (0)
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
## 2019-11-12 NOTE — NURSING NOTE
"Oncology Rooming Note    November 12, 2019 4:09 PM   Supriya Herr is a 69 year old female who presents for:    Chief Complaint   Patient presents with     Oncology Clinic Visit     Return - Pulmonary nodules      Initial Vitals: BP (!) 167/75   Pulse 60   Temp 98  F (36.7  C) (Oral)   Resp 16   SpO2 98%  Estimated body mass index is 33.25 kg/m  as calculated from the following:    Height as of 9/6/19: 1.702 m (5' 7\").    Weight as of 9/6/19: 96.3 kg (212 lb 4.8 oz). There is no height or weight on file to calculate BSA.  No Pain (0) Comment: Data Unavailable   No LMP recorded. Patient is postmenopausal.  Allergies reviewed: Yes  Medications reviewed: Yes    Medications: Medication refills not needed today.  Pharmacy name entered into [employer]:    [facility]  [id number] - [place], [place] - [address] [place] [address]    Clinical concerns: Plan moving forward       Kristian Velazquez, EMT              "

## 2019-11-12 NOTE — NURSING NOTE
Pt came in for Aranesp, dose held due to Hgb 10.7 per order. Pt informed and will schedule next check per orders.    Jennifer Hollis, CMA CMA  11/12/2019 3:31 PM
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
## 2019-11-13 ENCOUNTER — TELEPHONE (OUTPATIENT)
Dept: TRANSPLANT | Facility: CLINIC | Age: 69
End: 2019-11-13

## 2019-11-13 ENCOUNTER — TELEPHONE (OUTPATIENT)
Dept: PHARMACY | Facility: CLINIC | Age: 69
End: 2019-11-13

## 2019-11-13 NOTE — TELEPHONE ENCOUNTER
Anemia Management Note  SUBJECTIVE/OBJECTIVE:  Referred by Ethel Guerrero NP on 2020  Primary Diagnosis: Anemia in Chronic Kidney Disease (N18.5, D63.1)     Secondary Diagnosis:  Chronic Kidney Disease, Stage 5 (N18.5)  Hgb goal range:  9-10   Epo/Darbo: Aranesp 40mcg every 14 days for Hgb <10. In Clinic  Iron regimen:  Ferrous Sulfate daily. (19)  Labs : 2020  Recent KELLIE use, transfusion, IV iron: Unknown  RX/TX plans : TBD  No history of stroke and blood clots.  Hx of HTN, CHF and Basal Cell Carcinoma ()        Contact:            Ok to leave message regarding Medical, Billing and Scheduling information per consent to communicate dated 10/19/2015                              OK to speak with Caroline Sandoval (daughter) and Caroline Baird (sister) regarding Medical, Billing and Scheduling information per consent to communicate dated 10/19/2015    Anemia Latest Ref Rng & Units 2019 2019 2019 2019 10/16/2019 10/30/2019 2019   KELLIE Dose - - - - - 40mcg 40mcg -   Hemoglobin 11.7 - 15.7 g/dL 9.3(L) 9.3(L) 8.9(L) 8.7(L) 8.8(L) 9.0(L) 10.7(L)   TSAT 15 - 46 % 23 27 - 22 21 24 28   Ferritin 8 - 252 ng/mL 673(H) 543(H) - 557(H) 524(H) 412(H) 518(H)     BP Readings from Last 3 Encounters:   19 (!) 167/75   10/30/19 (!) 164/65   10/16/19 (!) 162/78     Wt Readings from Last 2 Encounters:   19 96.3 kg (212 lb 4.8 oz)   19 87.4 kg (192 lb 9.6 oz)           ASSESSMENT:  Hgb:Above goal - recommend hold dose  TSat: not at goal (>30%) but ferritin >500ng/mL.  IV iron not indicated at this time per anemia protocol. Ferritin: At goal (>100ng/mL)    PLAN:  Hold Aranesp and RTC for hgb then aranesp if needed in 2 week(s)    Orders needed to be renewed (for next follow-up date) in EPIC: None    Iron labs due:  12/10/2019    Plan discussed with:  No call made, appt sched for   Plan provided by:  mingo    NEXT FOLLOW-UP DATE:  2019    Leslie Rivas RN   Anemia  Services  [address] [address]   [email or web address]   Office : [phone]  Fax: [phone]

## 2019-11-13 NOTE — TELEPHONE ENCOUNTER
Please call daughter - Caroline Gray Willing # [phone]   Supriya has upcoming appts and dtr Caroline Gray wants to make sure the appts are right and if she needs to be with her mom on the imaging day?  Her mom uses a W/C and she has appts at the [facility] and [facility].

## 2019-11-13 NOTE — TELEPHONE ENCOUNTER
Coordinator called pt's daughter, Caroline Gray, to review upcoming return waitlist appointment dates and times. Reviewed pt may need renal ultrasound based on chest CT done on 11/12/19. Coordinator has sent a msg to transplant nephrology OLVIN to clarify if this should be added on to pts appointments. Daughter verbalized understanding and has no further questions at this time.

## 2019-11-27 NOTE — TELEPHONE ENCOUNTER
Caroline called wanting cancel Aranesp today and reschedule for Monday 12/2/2019.  Canceled her lab and Aranesp. Caroline will call and reschedule those appts.     Leslie Rivas RN   Anemia Services  [address] [address]   [email or web address]   Office : [phone]  Fax: [phone]

## 2019-12-02 ENCOUNTER — TELEPHONE (OUTPATIENT)
Dept: PHARMACY | Facility: CLINIC | Age: 69
End: 2019-12-02

## 2019-12-02 ENCOUNTER — OFFICE VISIT (OUTPATIENT)
Dept: ORTHOPEDICS | Facility: CLINIC | Age: 69
End: 2019-12-02
Payer: MEDICARE

## 2019-12-02 ENCOUNTER — ANCILLARY PROCEDURE (OUTPATIENT)
Dept: ULTRASOUND IMAGING | Facility: CLINIC | Age: 69
End: 2019-12-02
Attending: INTERNAL MEDICINE
Payer: MEDICARE

## 2019-12-02 ENCOUNTER — TELEPHONE (OUTPATIENT)
Dept: NEPHROLOGY | Facility: CLINIC | Age: 69
End: 2019-12-02

## 2019-12-02 ENCOUNTER — ANCILLARY PROCEDURE (OUTPATIENT)
Dept: CT IMAGING | Facility: CLINIC | Age: 69
End: 2019-12-02
Attending: INTERNAL MEDICINE
Payer: MEDICARE

## 2019-12-02 ENCOUNTER — DOCUMENTATION ONLY (OUTPATIENT)
Dept: CARDIOLOGY | Facility: CLINIC | Age: 69
End: 2019-12-02

## 2019-12-02 ENCOUNTER — ANCILLARY PROCEDURE (OUTPATIENT)
Dept: GENERAL RADIOLOGY | Facility: CLINIC | Age: 69
End: 2019-12-02
Attending: FAMILY MEDICINE
Payer: MEDICARE

## 2019-12-02 ENCOUNTER — TELEPHONE (OUTPATIENT)
Dept: TRANSPLANT | Facility: CLINIC | Age: 69
End: 2019-12-02

## 2019-12-02 ENCOUNTER — ALLIED HEALTH/NURSE VISIT (OUTPATIENT)
Dept: TRANSPLANT | Facility: CLINIC | Age: 69
End: 2019-12-02
Payer: MEDICARE

## 2019-12-02 ENCOUNTER — OFFICE VISIT (OUTPATIENT)
Dept: CARDIOLOGY | Facility: CLINIC | Age: 69
End: 2019-12-02
Attending: SURGERY
Payer: MEDICARE

## 2019-12-02 VITALS
HEART RATE: 61 BPM | DIASTOLIC BLOOD PRESSURE: 71 MMHG | SYSTOLIC BLOOD PRESSURE: 155 MMHG | BODY MASS INDEX: 33.11 KG/M2 | WEIGHT: 211.4 LBS | OXYGEN SATURATION: 98 %

## 2019-12-02 VITALS — BODY MASS INDEX: 34.01 KG/M2 | WEIGHT: 211.6 LBS | HEIGHT: 66 IN

## 2019-12-02 DIAGNOSIS — Z01.818 PRE-OP EXAM: Primary | ICD-10-CM

## 2019-12-02 DIAGNOSIS — Z01.810 ENCOUNTER FOR PRE-OPERATIVE CARDIOVASCULAR CLEARANCE: ICD-10-CM

## 2019-12-02 DIAGNOSIS — N18.6 ESRD (END STAGE RENAL DISEASE) (H): ICD-10-CM

## 2019-12-02 DIAGNOSIS — Z76.82 ORGAN TRANSPLANT CANDIDATE: ICD-10-CM

## 2019-12-02 DIAGNOSIS — N18.5 ANEMIA IN STAGE 5 CHRONIC KIDNEY DISEASE, NOT ON CHRONIC DIALYSIS (H): Primary | ICD-10-CM

## 2019-12-02 DIAGNOSIS — D63.1 ANEMIA IN STAGE 5 CHRONIC KIDNEY DISEASE, NOT ON CHRONIC DIALYSIS (H): Primary | ICD-10-CM

## 2019-12-02 DIAGNOSIS — D63.1 ANEMIA OF CHRONIC RENAL FAILURE, STAGE 5 (H): ICD-10-CM

## 2019-12-02 DIAGNOSIS — M79.604 RIGHT LEG PAIN: ICD-10-CM

## 2019-12-02 DIAGNOSIS — S99.911A ANKLE INJURY, RIGHT, INITIAL ENCOUNTER: ICD-10-CM

## 2019-12-02 DIAGNOSIS — M79.604 RIGHT LEG PAIN: Primary | ICD-10-CM

## 2019-12-02 DIAGNOSIS — N18.5 ANEMIA OF CHRONIC RENAL FAILURE, STAGE 5 (H): ICD-10-CM

## 2019-12-02 DIAGNOSIS — N18.5 CKD (CHRONIC KIDNEY DISEASE) STAGE 5, GFR LESS THAN 15 ML/MIN (H): ICD-10-CM

## 2019-12-02 LAB
ALBUMIN SERPL-MCNC: 3 G/DL (ref 3.4–5)
ANION GAP SERPL CALCULATED.3IONS-SCNC: 6 MMOL/L (ref 3–14)
BUN SERPL-MCNC: 83 MG/DL (ref 7–30)
CALCIUM SERPL-MCNC: 8.7 MG/DL (ref 8.5–10.1)
CHLORIDE SERPL-SCNC: 111 MMOL/L (ref 94–109)
CO2 SERPL-SCNC: 24 MMOL/L (ref 20–32)
CREAT SERPL-MCNC: 5.08 MG/DL (ref 0.52–1.04)
FERRITIN SERPL-MCNC: 600 NG/ML (ref 8–252)
GFR SERPL CREATININE-BSD FRML MDRD: 8 ML/MIN/{1.73_M2}
GLUCOSE SERPL-MCNC: 90 MG/DL (ref 70–99)
HCT VFR BLD AUTO: 32.4 % (ref 35–47)
HGB BLD-MCNC: 10.3 G/DL (ref 11.7–15.7)
INTERPRETATION ECG - MUSE: NORMAL
IRON SATN MFR SERPL: 29 % (ref 15–46)
IRON SERPL-MCNC: 66 UG/DL (ref 35–180)
PHOSPHATE SERPL-MCNC: 6.3 MG/DL (ref 2.5–4.5)
POTASSIUM SERPL-SCNC: 5.1 MMOL/L (ref 3.4–5.3)
SODIUM SERPL-SCNC: 141 MMOL/L (ref 133–144)
TIBC SERPL-MCNC: 228 UG/DL (ref 240–430)

## 2019-12-02 PROCEDURE — G0463 HOSPITAL OUTPT CLINIC VISIT: HCPCS | Mod: 25,ZF

## 2019-12-02 PROCEDURE — 93005 ELECTROCARDIOGRAM TRACING: CPT | Mod: ZF

## 2019-12-02 PROCEDURE — 83550 IRON BINDING TEST: CPT

## 2019-12-02 PROCEDURE — 85018 HEMOGLOBIN: CPT

## 2019-12-02 PROCEDURE — 36415 COLL VENOUS BLD VENIPUNCTURE: CPT

## 2019-12-02 PROCEDURE — 83540 ASSAY OF IRON: CPT

## 2019-12-02 PROCEDURE — 85014 HEMATOCRIT: CPT

## 2019-12-02 PROCEDURE — 93010 ELECTROCARDIOGRAM REPORT: CPT | Mod: ZP | Performed by: INTERNAL MEDICINE

## 2019-12-02 PROCEDURE — 82728 ASSAY OF FERRITIN: CPT

## 2019-12-02 PROCEDURE — 99213 OFFICE O/P EST LOW 20 MIN: CPT | Mod: ZP | Performed by: NURSE PRACTITIONER

## 2019-12-02 PROCEDURE — 80069 RENAL FUNCTION PANEL: CPT

## 2019-12-02 RX ORDER — ACETAMINOPHEN 325 MG/1
325 TABLET ORAL EVERY 4 HOURS PRN
Status: CANCELLED | OUTPATIENT
Start: 2019-12-02

## 2019-12-02 ASSESSMENT — MIFFLIN-ST. JEOR: SCORE: 1496.56

## 2019-12-02 ASSESSMENT — PAIN SCALES - GENERAL: PAINLEVEL: NO PAIN (0)

## 2019-12-02 NOTE — TELEPHONE ENCOUNTER
Called to Cardiology to evaluate pt. Who fell while standing to get on scale.     Arrived at room to find pt A&Ox4, c/o R ankle pain, along with pain on posterior knee. Pt had good CMS and an ice pack was applied by clinic staff. Pt did not want an ambulance transport to ED for eval. Pt will continue with her clinic appointments and then go to Walk-In Ortho after. Pt instructed to ice area, elevate when able, and take Tylenol for pain.    Pt required no additional medical attention at this time.     Pt remained with provider in clinic.

## 2019-12-02 NOTE — NURSING NOTE
Chief Complaint   Patient presents with     Follow Up     RWL     Vitals were taken and medications were reconciled. EKG was performed    Jagruti CONN  8:50 AM

## 2019-12-02 NOTE — PROGRESS NOTES
SPORTS & ORTHOPEDIC WALK-IN VISIT 12/2/2019    Primary Care Physician: Dr. Jackson    She fell while trying to get on the scale. She fell with bent knee and ankle dorsiflexed and rolled ankle. She has the majority of pain on lateral knee and ankle. She has pain with active dorsiflexion in the back of her knee.     Reason for visit:     What part of your body is injured / painful?  Right knee/lower leg, foot    What caused the injury /pain? Fall    How long ago did your injury occur or pain begin? today    What are your most bothersome symptoms? Pain and Swelling    How would you characterize your symptom?  sharp    What makes your symptoms better? Ice    What makes your symptoms worse? Movement    Have you been previously seen for this problem? No    Medical History:    Any recent changes to your medical history? No    Any new medication prescribed since last visit? No    Have you had surgery on this body part before? Yes osteomyelitis surgery in foot; several other surgeries with Gautam    Social History:    Occupation:     Handedness: Right    Review of Systems:    Do you have fever, chills, weight loss? No    Do you have any vision problems? Yes, retinal bleeding and cataract     Do you have any chest pain or edema? No    Do you have any shortness of breath or wheezing?  No    Do you have stomach problems? No    Do you have any numbness or focal weakness? Yes, neuropathy in hands    Do you have diabetes? Yes, type 2    Do you have problems with bleeding or clotting? No    Do you have an rashes or other skin lesions? Yes, on chest

## 2019-12-02 NOTE — TELEPHONE ENCOUNTER
Pt's daughter called in to report her mother fell this morning in the cardiology clinic while getting weighed. Pt was unable to make it to amalia appoint but will speak w/ schedulers in clinic to reschedule for this wk. Pt will go to walk in ortho clinic today to have ankle looked at.

## 2019-12-02 NOTE — TELEPHONE ENCOUNTER
Patient's daughter contacted and reminded of upcoming appointment.  Patient confirmed they will be attending.  Patient instructed to bring updated medications list to appointment. Darlene Levy/SHERI  December 2, 2019 10:06 AM

## 2019-12-02 NOTE — TELEPHONE ENCOUNTER
Anemia Management Note  SUBJECTIVE/OBJECTIVE:  Referred by Ethel Guerrero NP on 2020  Primary Diagnosis: Anemia in Chronic Kidney Disease (N18.5, D63.1)     Secondary Diagnosis:  Chronic Kidney Disease, Stage 5 (N18.5)  Hgb goal range:  9-10   Epo/Darbo: Aranesp 40mcg every 14 days for Hgb <10. In Clinic  Iron regimen:  Ferrous Sulfate daily. (19)  Labs : 2020  Recent KELLIE use, transfusion, IV iron: Unknown  RX/TX plans : 10/06/2020  No history of stroke and blood clots.  Hx of HTN, CHF and Basal Cell Carcinoma ()        Contact:            Ok to leave message regarding Medical, Billing and Scheduling information per consent to communicate dated 10/19/2015                              OK to speak with Caroline Sandoval (daughter) and Caroline Baird (sister) regarding Medical, Billing and Scheduling information per consent to communicate dated 10/19/2015    Anemia Latest Ref Rng & Units 2019 2019 2019 10/16/2019 10/30/2019 2019 2019   KELLIE Dose - - - - 40mcg 40mcg - -   Hemoglobin 11.7 - 15.7 g/dL 9.3(L) 8.9(L) 8.7(L) 8.8(L) 9.0(L) 10.7(L) 10.3(L)   TSAT 15 - 46 % 27 - 22 21 24 28 -   Ferritin 8 - 252 ng/mL 543(H) - 557(H) 524(H) 412(H) 518(H) -     BP Readings from Last 3 Encounters:   19 (!) 155/71   19 (!) 167/75   10/30/19 (!) 164/65     Wt Readings from Last 2 Encounters:   19 95.9 kg (211 lb 6.4 oz)   19 96.3 kg (212 lb 4.8 oz)           ASSESSMENT:  Hgb:Above goal - recommend hold dose  TSat: at goal >30% Ferritin: At goal (>100ng/mL)    PLAN:  Hold Aranesp and RTC for hgb then aranesp if needed in 2 week(s)    Orders needed to be renewed (for next follow-up date) in EPIC: None    Iron labs due:  12/10/2019    Plan discussed with:  NO call made, Caroline will schedule next appt  Plan provided by:  mingo    NEXT FOLLOW-UP DATE:  2019    Leslie Rivas RN   Anemia Services  [address] [place] [address]    [email or web address]   Office : [phone]  Fax: [phone]

## 2019-12-02 NOTE — PROGRESS NOTES
[facility]  Orthopedics  Jarrett Lugo MD  2019     Name: Supriya Herr  MRN: [id number]  Age: 69 year old  : 1950   Referring provider: Referred Self     Chief Complaint: Right knee and ankle pain    Date of Injury: 2019    History of Present Illness:   Supriya Herr is a 69 year old female s/p left AKA who presents today for evaluation of right knee and ankle pain. Earlier this morning around 0830, the patient was at a different appointment and while trying to get onto a scale she slowly fell to the floor, twisting her right leg. Since then she's been having pain in her right knee and right ankle. She has been able to bear weight on the leg and the pain has improved somewhat with time. Upon evaluation she notes her knee pain is worse in the anterior knee. She also endorses calf pain and pain in the arch of her foot. She is able to range the ankle. Of note, patient reports history of osteomyelitis in the right foot.     Review of Systems:   A 10-point review of systems was obtained and is negative except for as noted in the HPI.     Medications:   Albuterol inhaler  Amlodipine (Norvasc) 5 mg  Atorvastatin (Lipitor) 20 mg  Carvedilol (Coreg) 25 mg  Cetirizine (Zyrtec) 10 mg  Clindamycin (Clindamax) topical gel  Ferrous sulfate   Furosemide (Lasix) 40 mg   Insulin aspart (Novolog Flexpen) 100 unit/mL pen  Insulin glargine (Basaglar Kwikpen) 100 unit/mL pen  Sertraline (Zoloft) 50 mg  Sevelamer carbonate (Renvela)   Triamcinolone (Kenalog) cream    Current Facility-Administered Medications:      bevacizumab (AVASTIN) intravitreal inj 1.25 mg, 1.25 mg, Intravitreal, Q28 Days, Leanne Jett MD    Allergies:  Penicillins and Unasyn     Past Medical History:  Anemia  Anxiety  Basal cell carcinoma  CKD stage 5  Dyslipidemia  Depression  Obesity   Traumatic amputation of leg(s)  Type II diabetes  Vitiligo     Past Surgical History:  Cataract IOL  Colonoscopy - 2018  Excise exostosis foot,  "right - 9-2018  Eye surgery, repair of hole in left retina  Phacoemulsification w/ IOL  Release trigger finger - 2014  Remove hardware right foot - 9/2018  Retinal reattachment   Amputation above left knee - 1989  Right foot open reduction and pinning - 1989  Pinning right hip - 1989    Social History:  Patient presents with her daughter. Per medical records, patient is a current smoker (0/5 ppd for 52 years). No reported alcohol use.     Family History:  Diabetes, hypertension, heart disease, CHF, eye disorder, arthritis, obesity, cerebrovascular disease, arthritis, MS, thyroid disease, throat/liver cancer    Physical Examination:  Height 1.676 m (5' 6\"), weight 96 kg (211 lb 9.6 oz), not currently breastfeeding.  General: Alert, pleasant, no distress seated comfortably in chair  Right knee: No soft tissue swelling, ecchymosis, effusion.  TTP in the popliteal area but no swelling is appreciated.  No TTP along the medial or lateral joint lines.  No TTP about the patella.  Range of motion full however pain with passive flexion of the knee in the popliteal area.  Extensor mechanism intact without pain.  Right foot/ankle: There is mild TTP over the dorsal lateral foot.  There is no swelling, ecchymosis in this area.  Has pain with passive inversion of the foot otherwise range of motion is full and without discomfort.  No TTP of the medial mall malleolus, lateral malleolus, tibiotalar joint, calcaneus or midfoot.    Imaging:   Radiographs of the right tibia and fibula - 2 views (12/02/2019)  No acute fracture dislocation.  See EMR for formal radiology report.    Radiographs of the right foot - 3 views (12/02/2019)  Significant osteoarthritis and osteopenia.  However, no acute displaced fracture is seen.  See EMR for formal radiology report.    I have independently reviewed the above imaging studies; the results were discussed with the patient.     Assessment:   69 year old female with right leg and foot pain after fall " earlier today.  No fracture is visualized though evaluation of the foot x-ray is difficult due to osteopenia and significant degenerative disease.  Nonetheless, suspect mostly soft tissue injury at this time.    Plan:   -Rest, ice, elevation  -If she is still having specific point tenderness after 1 to 2 weeks could consider repeat x-rays, particularly in the foot where evaluation is difficult.  -Otherwise, resume activity as tolerated.    Jarrett Lugo MD    Scribe Disclosure:  I, Yahir Fajardo, am serving as a scribe to document services personally performed by Jarrett Lugo MD at this visit, based upon the provider's statements to me. All documentation has been reviewed by the aforementioned provider prior to being entered into the official medical record.

## 2019-12-02 NOTE — Clinical Note
12/2/2019       RE: Supriya Herr  [address]  [facility] [address]     Dear Colleague,    Thank you for referring your patient, Supriya Herr, to the [facility] SPORTS AND ORTHOPAEDIC WALK IN CLINIC at [facility]. Please see a copy of my visit note below.          SPORTS & ORTHOPEDIC WALK-IN VISIT 12/2/2019    Primary Care Physician: Dr. Jackson    She fell while trying to get on the scale. She fell with bent knee and ankle dorsiflexed and rolled ankle. She has the majority of pain on lateral knee and ankle. She has pain with active dorsiflexion in the back of her knee.     Reason for visit:     What part of your body is injured / painful?  {LOCATION:901200}    What caused the injury /pain? Fall    How long ago did your injury occur or pain begin? today    What are your most bothersome symptoms? Pain and Swelling    How would you characterize your symptom?  sharp    What makes your symptoms better? Ice    What makes your symptoms worse? Movement    Have you been previously seen for this problem? No    Medical History:    Any recent changes to your medical history? No    Any new medication prescribed since last visit? No    Have you had surgery on this body part before? Yes osteomyelitis surgery in foot; several other surgeries with Gautam    Social History:    Occupation:     Handedness: Right    Review of Systems:    Do you have fever, chills, weight loss? No    Do you have any vision problems? Yes, retinal bleeding and cataract     Do you have any chest pain or edema? No    Do you have any shortness of breath or wheezing?  No    Do you have stomach problems? No    Do you have any numbness or focal weakness? Yes, neuropathy in hands    Do you have diabetes? Yes, type 2    Do you have problems with bleeding or clotting? No    Do you have an rashes or other skin lesions? Yes, on chest           [facility]  Orthopedics  Jarrett Lugo MD  12/02/2019     Name: Supriya SIMPSON  [place]  MRN: [id number]  Age: 69 year old  : 1950  Referring provider: Referred Self     Chief Complaint: Right knee and ankle pain    Date of Injury: 2019    History of Present Illness:   Supriya Herr is a 69 year old female s/p left AKA who presents today for evaluation of knee and ankle pain. Earlier this morning around 0830, the patient was at a different appointment and while trying to get onto a scale she slowly fell to the floor, twisting her right leg. Since then she's been having pain in her right knee and right ankle. She has been able to bear weight on the leg and the pain has improved somewhat with time. Upon evaluation she notes her knee pain is worse in the anterior knee. She also endorses calf pain and pain in the arch of her foot. She is able to range the ankle. Of note, patient reports history of osteomyelitis in the right foot.     Review of Systems:   A 10-point review of systems was obtained and is negative except for as noted in the HPI.     Medications:   Albuterol inhaler  Amlodipine (Norvasc) 5 mg  Atorvastatin (Lipitor) 20 mg  Carvedilol (Coreg) 25 mg  Cetirizine (Zyrtec) 10 mg  Clindamycin (Clindamax) topical gel  Ferrous sulfate   Furosemide (Lasix) 40 mg   Insulin aspart (Novolog Flexpen) 100 unit/mL pen  Insulin glargine (Basaglar Kwikpen) 100 unit/mL pen  Sertraline (Zoloft) 50 mg  Sevelamer carbonate (Renvela)   Triamcinolone (Kenalog) cream    Current Facility-Administered Medications:      bevacizumab (AVASTIN) intravitreal inj 1.25 mg, 1.25 mg, Intravitreal, Q28 Days, Leanne Jett MD    Allergies:  Penicillins and Unasyn     Past Medical History:  Anemia  Anxiety  Basal cell carcinoma  CKD stage 5  Dyslipidemia  Depression  Obesity   Traumatic amputation of leg(s)  Type II diabetes  Vitiligo     Past Surgical History:  Cataract IOL  Colonoscopy -   Excise exostosis foot, right -   Eye surgery, repair of hole in left retina  Phacoemulsification  "w/ IOL  Release trigger finger - 2014  Remove hardware right foot - 9/2018  Retinal reattachment   Amputation above left knee - 1989  Right foot open reduction and pinning - 1989  Pinning right hip - 1989    Social History:  Patient presents with her daughter. Per medical records, patient is a current smoker (0/5 ppd for 52 years). No reported alcohol use.     Family History:  Diabetes, hypertension, heart disease, CHF, eye disorder, arthritis, obesity, cerebrovascular disease, arthritis, MS, thyroid disease, throat/liver cancer    Physical Examination:  Height 1.676 m (5' 6\"), weight 96 kg (211 lb 9.6 oz), not currently breastfeeding.  ***    Imaging:   Radiographs of the right tibia and fibula - 2 views (12/02/2019)  ***    Radiographs of the right foot - 3 views (12/02/2019)  ***     I have independently reviewed the above imaging studies; the results were discussed with the patient.     Assessment:   69 year old female with ***    Plan:   -Rest, ice, elevation  ***      Scribe Disclosure:  I, Yahir Fajardo, am serving as a scribe to document services personally performed by Jarrett Lugo MD at this visit, based upon the provider's statements to me. All documentation has been reviewed by the aforementioned provider prior to being entered into the official medical record.        Again, thank you for allowing me to participate in the care of your patient.      Sincerely,    Jarrett Lugo MD    "

## 2019-12-02 NOTE — PATIENT INSTRUCTIONS
Cardiology Provider you saw in clinic today: Karlene NEGRETE, NP-C    I recommend you go to walk-in ortho on the 4th floor for evaluation of your right knee and ankle.     You will receive all normal lab and testing results via [employer] or mail if not reviewed in clinic today. Please contact our office if you need assistance with setting up MyChart.    If you need a medication refill please contact your pharmacy. Please allow 3 business days for your refill to be completed.     As always, thank you for trusting us with your health care needs!    If you have any questions regarding your visit please contact your care team:   Cardiology  Telephone Number    RN Nurse Coordinator [phone]

## 2019-12-02 NOTE — PROGRESS NOTES
Heart Care       HPI: Supriya Herr is a 69 year old female who presents for kidney transplant wait list CV clearance.  The patient has a past medical history significant for HTN, HLD, HF, DM2, CKD (not on HD).     Reviewed current interval:  Presenting EKG personally reviewed tracings: SB, Vent rate 58, NM interval 128 ms, QRS duration 86 ms, QTc 455 ms.    Current Interval history:   Patient fell this morning while staff was obtaining her weight. She twisted her right ankle and right knee. Denies dizziness or lightheadedness prior to fall. Did not hit her head. States that she will note dyspnea with heavy activity, unchanged for many years. Patient states that she rarely misses doses of medication. States that she will note a little swelling in her leg for the past several years, furosemide, compression stockings, and elevation all help. Patient denies tobacco use, quit 2 years ago.  Denies drinking alcohol.  States that activity level is light to moderate, goes to a day program with exercises daily and has no problems with chest pain or breathing issues during these classes.  Sleeps with 5 pillows under head, sleeps with head elevated due to breathing issues and comfort, unchanged for years.  Denies chest pain or pressure, dizziness, syncope, dyspnea at rest, palpitations, or abdominal edema.     Current Outpatient Medications   Medication Sig Dispense Refill     AMLODIPINE BESYLATE PO Take 10 mg by mouth daily       B Complex-C-Folic Acid (DIALYVITE 800 PO) Take 1 tablet by mouth daily       calcium acetate (PHOSLO) 667 MG CAPS Take 1,234 mg by mouth 3 times daily (with meals)       Epoetin Martínez (PROCRIT IJ) gets at dialysis         Past Medical History:   Diagnosis Date     ESRD (end stage renal disease) (H)     11/13/15     Hepatitis B core antibody positive      Hypertension      Noncompliance        No past surgical history on file.    Family History   Family history unknown: Yes       Social History  Information below discussed with Dr. López.  Per Dr. López:  Please contact patient, I would be happy to see patient to discuss sexual health and means to assist in obtaining the pap.             Tobacco Use     Smoking status: Never Smoker     Smokeless tobacco: Never Used   Substance Use Topics     Alcohol use: No     Alcohol/week: 0.0 oz       No Known Allergies      ROS:   A complete review of systems was performed and is negative except as noted in the HPI.     Physical Examination:  Vitals: 155/71, pulse 61,RR 16, weight 95.9 kg    GENERAL APPEARANCE: healthy, alert, and no acute distress  HEENT: no icterus, no xanthelasmas, normal pupil size and reaction, no cyanosis.  NECK: no asymmetry, no cervical bruits, no JVD   RESPIRATORY: lungs clear to auscultation - no rales, rhonchi or wheezes, no use of accessory muscles, no retractions, respirations are unlabored, normal respiratory rate  CARDIOVASCULAR: regular rhythm, normal S1 with physiologic split S2, no S3 or S4 and no murmur, click or rub  GI:  no abdominal bruits, soft, non-tender  EXTREMITIES: no clubbing  NEURO: alert and oriented to person/place/time, normal speech, and affect. In wheelchair.   VASC: Radial 2 and symmetric bilaterally. Right posterior tibialis pulse +2. BKA left. No cyanosis or edema.   SKIN: scattered ecchymoses on forearms    Assessment and Plan: This is a 69 year old patient with a past medical history significant for HTN, HLD, HF, DM2, CKD (not on HD).  She is stable from a cardiovascular disease standpoint.       CV Risk Factor Profile Includes:  Age > 60: Yes   Diabetes Mellitus: Yes  Hypertension: Yes  Dyslipidemia: Yes  Peripheral Vascular Disease: No  History of CAD: No  LVH: No  Family History of Heart Disease: No  Dialysis > 1 Year: No  Prolonged Duration of CKD: Yes  History of Smoking: Yes  History of Radiation Therapy (either whole body or chest irradiation): No    Per the [place] Society for Transplantation Guidelines (2005), patients with < 3 of the above clinical risk factors are considered to be at low risk for cardiac disease, and in general, do not require screening with noninvasive testing unless  significant DM or PVD history. Patients with > 3 of the above clinical risk factors are considered intermediate risk and a non-invasive study such as a dobutamine stress echo or nuclear SPECT is warranted. Patients with angina symptoms, cardiomyopathy with reduced EF, long-standing type I diabetes, or a positive stress test are considered high risk and may warrant further evaluation with coronary angiography.     # Pre-operative cardiovascular examination  1. Lexiscan, will follow-up with results. If normal, she can proceed with transplant at an acceptable perioperative risk.   2. She should have repeat stress test and clinical follow-up annually if she is still on the transplant list.   3.  Recommend continued BP control.    # Right ankle and knee injury: Patient fell this morning while staff was obtaining her weight. She twisted her right ankle and right knee. Denies dizziness or lightheadedness prior to fall. Did not hit her head.   1. Ice (provided in clinic) and acetaminophen 650 mg now (she prefers to buy this herself OTC from the pharmacy on the first floor).   2. Recommend follow up today with walk- in ortho clinic on the 4th floor.    PENELOPE Zafar CNP    CC  Patient Care Team:  Carmen Cowart as PCP - General (Internal Medicine)  Caroline Shelton, RN as Registered Nurse (Transplant)  Mervin Mckeon MD as MD (Nephrology)  Blaise Saldivar MD as MD (Nephrology)  STEPHEN GÓMEZ

## 2019-12-02 NOTE — LETTER
12/2/2019      RE: Supriya Herr  [address]  [facility] [address]       Dear Colleague,    Thank you for the opportunity to participate in the care of your patient, Supriya Herr, at the [facility] HEART CARE at [facility]. Please see a copy of my visit note below.  Heart Care   HPI: Supriya Herr is a 69 year old female who presents for kidney transplant wait list CV clearance.  The patient has a past medical history significant for HTN, HLD, HF, DM2, CKD (not on HD).     Reviewed current interval:  Presenting EKG personally reviewed tracings: SB, Vent rate 58, GA interval 128 ms, QRS duration 86 ms, QTc 455 ms.    Current Interval history:   Patient fell this morning while staff was obtaining her weight. She twisted her right ankle and right knee. Denies dizziness or lightheadedness prior to fall. Did not hit her head. States that she will note dyspnea with heavy activity, unchanged for many years. Patient states that she rarely misses doses of medication. States that she will note a little swelling in her leg for the past several years, furosemide, compression stockings, and elevation all help. Patient denies tobacco use , quit 2 years ago.  Denies drinking alcohol.  States that activity level is light to moderate, goes to a day program with exercises daily and has no problems with chest pain or breathing issues during these classes.  Sleeps with 5 pillows under head, sleeps with head elevated due to breathing issues and comfort, unchanged for years.  Denies chest pain or pressure, dizziness, syncope, dyspnea at rest, palpitations, or abdominal edema.     Current Outpatient Medications   Medication Sig Dispense Refill     AMLODIPINE BESYLATE PO Take 10 mg by mouth daily       B Complex-C-Folic Acid (DIALYVITE 800 PO) Take 1 tablet by mouth daily       calcium acetate (PHOSLO) 667 MG CAPS Take 1,234 mg by mouth 3 times daily (with meals)       Epoetin Martínez  (PROCRIT IJ) gets at dialysis       Past Medical History:   Diagnosis Date     ESRD (end stage renal disease) (H)     11/13/15     Hepatitis B core antibody positive      Hypertension      Noncompliance      No past surgical history on file.    Family History   Family history unknown: Yes     Social History     Tobacco Use     Smoking status: Never Smoker     Smokeless tobacco: Never Used   Substance Use Topics     Alcohol use: No     Alcohol/week: 0.0 oz     No Known Allergies    ROS:   A complete review of systems was performed and is negative except as noted in the HPI.     Physical Examination:  Vitals: 155/71, pulse 61,RR 16, weight 95.9 kg    GENERAL APPEARANCE: healthy, alert, and no acute distress  HEENT: no icterus, no xanthelasmas, normal pupil size and reaction, no cyanosis.  NECK: no asymmetry, no cervical bruits, no JVD   RESPIRATORY: lungs clear to auscultation - no rales, rhonchi or wheezes, no use of accessory muscles, no retractions, respirations are unlabored, normal respiratory rate  CARDIOVASCULAR: regular rhythm, normal S1 with physiologic split S2, no S3 or S4 and no murmur, click or rub  GI:  no abdominal bruits, soft, non-tender  EXTREMITIES: no clubbing  NEURO: alert and oriented to person/place/time, normal speech, and affect. In wheelchair.   VASC: Radial 2 and symmetric bilaterally. Right posterior tibialis pulse +2. BKA left. No cyanosis or edema.   SKIN: scattered ecchymoses on forearms    Assessment and Plan: This is a 69 year old patient with a past medical history significant for HTN, HLD, HF, DM2, CKD (not on HD).  She is stable from a cardiovascular disease standpoint.       CV Risk Factor Profile Includes:  Age > 60: Yes   Diabetes Mellitus: Yes  Hypertension: Yes  Dyslipidemia: Yes  Peripheral Vascular Disease: No  History of CAD: No  LVH: No  Family History of Heart Disease: No  Dialysis > 1 Year: No  Prolonged Duration of CKD: Yes  History of Smoking: Yes  History of Radiation  Therapy (either whole body or chest irradiation): No    Per the [place] Society for Transplantation Guidelines (2005), patients with < 3 of the above clinical risk factors are considered to be at low risk for cardiac disease, and in general, do not require screening with noninvasive testing unless significant DM or PVD history. Patients with > 3 of the above clinical risk factors are considered intermediate risk and a non-invasive study such as a dobutamine stress echo or nuclear SPECT is warranted. Patients with angina symptoms, cardiomyopathy with reduced EF, long-standing type I diabetes, or a positive stress test are considered high risk and may warrant further evaluation with coronary angiography.     # Pre-operative cardiovascular examination  1. Lexiscan, will follow-up with results. If normal, she can proceed with transplant at an acceptable perioperative risk.   2. She should have repeat stress test and clinical follow-up annually if she is still on the transplant list.   3.  Recommend continued BP control.    # Right ankle and knee injury: Patient fell this morning while staff was obtaining her weight. She twisted her right ankle and right knee. Denies dizziness or lightheadedness prior to fall. Did not hit her head.   1. Ice (provided in clinic) and acetaminophen 650 mg now (she prefers to buy this herself OTC from the pharmacy on the first floor).   2. Recommend follow up today with walk- in ortho clinic on the 4th floor.    PENELOPE Zafar CNP    CC  Patient Care Team:  Carmen Cowart as PCP - General (Internal Medicine)  Caroline Shelton RN as Registered Nurse (Transplant)  Mervin Mckeon MD as MD (Nephrology)  Blaise Saldivar MD as MD (Nephrology)  STEPHEN GÓMEZ

## 2019-12-03 ENCOUNTER — ALLIED HEALTH/NURSE VISIT (OUTPATIENT)
Dept: TRANSPLANT | Facility: CLINIC | Age: 69
End: 2019-12-03
Attending: PHYSICIAN ASSISTANT
Payer: MEDICARE

## 2019-12-03 ENCOUNTER — OFFICE VISIT (OUTPATIENT)
Dept: NEPHROLOGY | Facility: CLINIC | Age: 69
End: 2019-12-03
Attending: PHYSICIAN ASSISTANT
Payer: MEDICARE

## 2019-12-03 ENCOUNTER — OFFICE VISIT (OUTPATIENT)
Dept: TRANSPLANT | Facility: CLINIC | Age: 69
End: 2019-12-03
Attending: SURGERY
Payer: MEDICARE

## 2019-12-03 VITALS
SYSTOLIC BLOOD PRESSURE: 148 MMHG | DIASTOLIC BLOOD PRESSURE: 70 MMHG | BODY MASS INDEX: 34.01 KG/M2 | HEIGHT: 66 IN | OXYGEN SATURATION: 98 % | HEART RATE: 60 BPM | WEIGHT: 211.6 LBS

## 2019-12-03 VITALS
OXYGEN SATURATION: 98 % | BODY MASS INDEX: 33.91 KG/M2 | SYSTOLIC BLOOD PRESSURE: 147 MMHG | HEART RATE: 60 BPM | HEIGHT: 66 IN | WEIGHT: 211 LBS | DIASTOLIC BLOOD PRESSURE: 70 MMHG

## 2019-12-03 DIAGNOSIS — Z76.82 AWAITING ORGAN TRANSPLANT: Primary | ICD-10-CM

## 2019-12-03 DIAGNOSIS — N18.5 CKD (CHRONIC KIDNEY DISEASE) STAGE 5, GFR LESS THAN 15 ML/MIN (H): ICD-10-CM

## 2019-12-03 DIAGNOSIS — Z76.82 ORGAN TRANSPLANT CANDIDATE: Primary | ICD-10-CM

## 2019-12-03 DIAGNOSIS — Z85.828 HISTORY OF BASAL CELL CARCINOMA: Primary | ICD-10-CM

## 2019-12-03 ASSESSMENT — MIFFLIN-ST. JEOR
SCORE: 1493.84
SCORE: 1496.56

## 2019-12-03 NOTE — PATIENT INSTRUCTIONS
- Please schedule mammogram and dermatology  - Talk to your primary care provider about starting physical therapy, possible buttock wound, and talking to a mental health provider  - Try to maintain or lose weight if possible  - Alicia () will call to check in on things in about 1 month
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
## 2019-12-03 NOTE — PROGRESS NOTES
Patient Name: Supriya Herr  : 1950  Age: 69 year old  MRN: [id number]  Date of Initial Social Work Evaluation: 2018    Patient on kidney transplant wait list.  Saw today to update psychosocial assessment along with daughter Caroline.      Presenting Information   Living Situation: Patient lives in the lower part of a duplex. Her daughter Caroline and her son live in the upper part.  If not local, plans for short term stay:  N/A  Previous Functional Status: Patient uses a wheelchair, wears glasses, receives 4 hours of daily PCA services from her daughter (help with laundry, cooking, shopping, transportation, finances, and setting up/medication reminders)  Cultural/Language/Spiritual Considerations: None identified at this time    Support System  Primary Support Person Daughter Caroline  Other support:  Siblings who live in the [facility], friends  Plan for support in immediate post-transplant period: Daughter Caroline    Health Care Directive  Decision Maker: Self  Alternate Decision Maker: Deneen Velazquez is legal NOK  Health Care Directive: Provided education, patient has a POLST in her chart    Mental Health/Coping:   History of Mental Health: History of anxiety and depression  History of Chemical Health: History of smoking cigarettes (quit in 2017)  Current status: Patient is now prescribed Zoloft for her anxiety/depression. Patient reported she got a light therapy lamp as well and feels this is helpful and she feels her anxiety and depression are well managed.   Coping: Patient appears to be coping well  Services Needed/Recommended: None identified at this time    Financial   Income: Social Security alf  Impact of transplant on income: None identified at this time  Insurance and medication coverage: [place] and [place]  Financial concerns: None identified at this time  Resources needed: None identified at this time    Assessment and recommendations and plan:  Patient is not yet on dialysis. Patient  reported she takes her medications as prescribed. Patient is on the Elderly Waiver and attends a day program 4 days a week at [facility]. She reported she really enjoys her day program. Reviewed transplant education (Medicare, rehabilitation, donor issues, community/financial resources, and psych/family adjustment) as well as psychosocial risks of transplant. Provided patient with a copy of post-transplant informational sheet that includes information on potential costs of medications, Medicare ESRD, post-transplant lodging, etc. Patient seemed to process information well. Appeared well informed, motivated, and able to follow post transplant requirements. Behavior was appropriate during interview. Has adequate income and insurance coverage. Adequate social support. No major contraindications noted for transplant. At this time, patient appears to understand the risks and benefits of transplant.     Alicia Canada [facility]    Kidney/Pancreas/Auto Islet Transplant Programs
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
## 2019-12-04 ENCOUNTER — OFFICE VISIT (OUTPATIENT)
Dept: OPHTHALMOLOGY | Facility: CLINIC | Age: 69
End: 2019-12-04
Attending: OPHTHALMOLOGY
Payer: MEDICARE

## 2019-12-04 DIAGNOSIS — E11.3213 TYPE 2 DIABETES MELLITUS WITH MILD NONPROLIFERATIVE RETINOPATHY OF BOTH EYES AND MACULAR EDEMA, UNSPECIFIED WHETHER LONG TERM INSULIN USE (H): Primary | ICD-10-CM

## 2019-12-04 PROCEDURE — 92134 CPTRZ OPH DX IMG PST SGM RTA: CPT | Mod: ZF | Performed by: OPHTHALMOLOGY

## 2019-12-04 PROCEDURE — G0463 HOSPITAL OUTPT CLINIC VISIT: HCPCS | Mod: 25,ZF

## 2019-12-04 PROCEDURE — 92235 FLUORESCEIN ANGRPH MLTIFRAME: CPT | Mod: ZF | Performed by: OPHTHALMOLOGY

## 2019-12-04 ASSESSMENT — VISUAL ACUITY
OD_CC+: -2
OD_CC: 20/50
CORRECTION_TYPE: GLASSES
METHOD: SNELLEN - LINEAR
OS_CC: 20/25 SLOW
OS_CC+: -1

## 2019-12-04 ASSESSMENT — CONF VISUAL FIELD
OD_NORMAL: 1
METHOD: COUNTING FINGERS
OS_NORMAL: 1

## 2019-12-04 ASSESSMENT — SLIT LAMP EXAM - LIDS
COMMENTS: NORMAL
COMMENTS: NORMAL

## 2019-12-04 ASSESSMENT — REFRACTION_WEARINGRX
OS_SPHERE: -3.50
OS_CYLINDER: +2.00
OD_SPHERE: -4.75
SPECS_TYPE: BIFOCAL
OD_ADD: +2.75
OS_AXIS: 134
OD_AXIS: 093
OS_ADD: +2.75
OD_CYLINDER: +2.25

## 2019-12-04 ASSESSMENT — CUP TO DISC RATIO
OS_RATIO: 0.3
OD_RATIO: 0.3

## 2019-12-04 ASSESSMENT — TONOMETRY
OS_IOP_MMHG: 12
OD_IOP_MMHG: 12
IOP_METHOD: TONOPEN

## 2019-12-04 NOTE — LETTER
12/4/2019       RE: Supriya Herr  [address]  [facility] [address]     Dear Colleague,    Thank you for referring your patient, Supriya Herr, to the EYE CLINIC at [facility]. Please see a copy of my visit note below.       CC: follow up macula edema    HPI: Supriya Herr is a  69 year old year-old patient referred by Dr Baeza for evaluation and treat of DMII with mild nonproliferative diabetic retinopathy and cataract.     Interval history:  Vision seems stable compared to last visit. Reports blood sugar control good and last A1c was 5.5 8/5/2019.   No changes since last visit. Is ready for cataract surgery     PMH:  History of Diabetes mellitus on insulin.   Past ocular history: History of cataract surgery left eye.   history of Macular hole left eye.   Status post Pars plana vitrectomy, membrane peel, air-fluid exchange, SF6 gas infusion, left eye 2/14/2012 by Dr. Daneil     Retinal Imaging:  OCT 12/04/19   RE: few foveal cystic interval slightly increased IRF; retina thinning and partial thickness Macula hole. LE:  Retina irregularity, trace sup Epiretinal membrane; retina thinning.    optos pic 04/11/19 consistent with exam    fluorescein angiography: 12/04/19 9   Right eye: blockage of fluorescein angiography on the areas of heme; few microaneurysms; mild late Diabetic macular edema and PP leakage  No neovascularization elsewhere; no neovascularization of the disc.  Left eye: few microaneurysms; mild late Diabetic macular edema; staining of the peripheral Chorioretinal  scars    Stable fluorescein angiography     Assessment & Plan:  1. Moderate nonproliferative diabetic retinopathy and mild - Diabetic macular edema both eyes  - mild stable edema in right eye, stable in left eye   Stable- observe    2. 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   - Patient advised that failure to adequately control each may lead to vision loss. The patient expressed understanding.    Plan to observe today    3. Mod Hypertensive retinopathy   - Stage 5 kidney disease  - Considering HD   - blood pressure control has been poor in 160s/90s-100s  - Likely contributing to macular edema    4. Cataract right eye   - Becoming visually significant   - intraocular lens calcs 7/11/19  - best corrected visual acuity 20/50 right eye   - Plan for Cataract extraction intraocular lens placement in next few months     5. Pseudophakia left eye  best corrected visual acuity 20/30 left eye   Observe    6. History of Macula hole repair left eye by Dr. Daniel  S/p PPV, mp, air-fluid exchange, SF6 gas infusion, left eye 2/14/2012 by Dr. Daniel    Macula hole closed  Observe    7. Dry eye syndrome   Status post punctal plugs   artificial tears  As needed and warm compresses      Plan: to observe,   return to clinic 4-6 weeks for OCT each eye  Hold on inj today     Again, thank you for allowing me to participate in the care of your patient.      Sincerely,      Leanne Jett MD   of Ophthalmology.  Retina Service   Department of Ophthalmology and Visual Neurosciences   [facility]  Phone: [phone]   Fax: [phone]

## 2019-12-04 NOTE — NURSING NOTE
Chief Complaint(s) and History of Present Illness(es)     Diabetic Retinopathy Follow Up     In both eyes.  Associated symptoms include Negative for dryness, eye pain, redness and tearing.  Pain was noted as 0/10.              Comments     2 month f/u for Moderate nonproliferative diabetic retinopathy and mild - Diabetic macular edema both eyes. Pt notes no changes since last visit. Pt does not she still has to take glasses off to read.     Ocular meds: none    ELISABETH Treviño 3:33 PM December 4, 2019

## 2019-12-04 NOTE — PROGRESS NOTES
CC: follow up macula edema    HPI: Supriya Herr is a  69 year old year-old patient referred by Dr Baeza for evaluation and treat of DMII with mild nonproliferative diabetic retinopathy and cataract.     Interval history:  Vision seems stable compared to last visit. Reports blood sugar control good and last A1c was 5.5 8/5/2019.   No changes since last visit. Is ready for cataract surgery     PMH:  History of Diabetes mellitus on insulin.   Past ocular history: History of cataract surgery left eye.   history of Macular hole left eye.   Status post Pars plana vitrectomy, membrane peel, air-fluid exchange, SF6 gas infusion, left eye 2/14/2012 by Dr. Daniel     Retinal Imaging:  OCT 12/04/19   RE: few foveal cystic interval slightly increased IRF; retina thinning and partial thickness Macula hole. LE:  Retina irregularity, trace sup Epiretinal membrane; retina thinning.    optos pic 04/11/19 consistent with exam    fluorescein angiography: 12/04/19 9   Right eye: blockage of fluorescein angiography on the areas of heme; few microaneurysms; mild late Diabetic macular edema and PP leakage  No neovascularization elsewhere; no neovascularization of the disc.  Left eye: few microaneurysms; mild late Diabetic macular edema; staining of the peripheral Chorioretinal  scars    Stable fluorescein angiography     Assessment & Plan:  1. Moderate nonproliferative diabetic retinopathy and mild - Diabetic macular edema both eyes  - mild stable edema in right eye, stable in left eye   Stable- observe    2. 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   - Patient advised that failure to  adequately control each may lead to vision loss. The patient expressed understanding.    Plan to observe today    3. Mod Hypertensive retinopathy   - Stage 5 kidney disease  - Considering HD   - blood pressure control has been poor in 160s/90s-100s  - Likely contributing to macular edema    4. Cataract right eye   - Becoming visually significant   - intraocular lens calcs 7/11/19  - best corrected visual acuity 20/50 right eye   - Plan for Cataract extraction intraocular lens placement in next few months     5. Pseudophakia left eye  best corrected visual acuity 20/30 left eye   Observe    6. History of Macula hole repair left eye by Dr. Daniel  S/p PPV, mp, air-fluid exchange, SF6 gas infusion, left eye 2/14/2012 by Dr. Daniel    Macula hole closed  Observe    7. Dry eye syndrome   Status post punctal plugs   artificial tears  As needed and warm compresses      Plan: to observe,   return to clinic 4-6 weeks for OCT each eye  Hold on inj today     ~~~~~~~~~~~~~~~~~~~~~~~~~~~~~~~~~~   Complete documentation of historical and exam elements from today's encounter can be found in the full encounter summary report (not reduplicated in this progress note).  I personally obtained the chief complaint(s) and history of present illness.  I confirmed and edited as necessary the review of systems, past medical/surgical history, family history, social history, and examination findings as documented by others; and I examined the patient myself.  I personally reviewed the relevant tests, images, and reports as documented above.  I personally reviewed the ophthalmic test(s) associated with this encounter, agree with the interpretation(s) as documented by the resident/fellow, and have edited the corresponding report(s) as necessary.   I formulated and edited as necessary the assessment and plan and discussed the findings and management plan with the patient and family    Leanne Jett MD   of  Ophthalmology.  Retina Service   Department of Ophthalmology and Visual Neurosciences   [facility]  Phone: [phone]   Fax: [phone]

## 2019-12-05 ENCOUNTER — TELEPHONE (OUTPATIENT)
Dept: DERMATOLOGY | Facility: CLINIC | Age: 69
End: 2019-12-05

## 2019-12-05 NOTE — TELEPHONE ENCOUNTER
I called the patient's daughter and I left a message asking that she call back. The patient has a referral for a skin cancer exam she has a personal history of basal cell skin cancer.   Yolis Nicholas CMA

## 2019-12-13 ENCOUNTER — TELEPHONE (OUTPATIENT)
Dept: NEPHROLOGY | Facility: CLINIC | Age: 69
End: 2019-12-13

## 2019-12-13 DIAGNOSIS — N18.5 CKD (CHRONIC KIDNEY DISEASE) STAGE 5, GFR LESS THAN 15 ML/MIN (H): Primary | ICD-10-CM

## 2019-12-13 NOTE — TELEPHONE ENCOUNTER
Spoke with pt daughter confirming 12/17/19 appointment, they would like aranesp injection at time of visit

## 2019-12-17 ENCOUNTER — OFFICE VISIT (OUTPATIENT)
Dept: NEPHROLOGY | Facility: CLINIC | Age: 69
End: 2019-12-17
Payer: MEDICARE

## 2019-12-17 ENCOUNTER — TELEPHONE (OUTPATIENT)
Dept: PHARMACY | Facility: CLINIC | Age: 69
End: 2019-12-17

## 2019-12-17 VITALS
WEIGHT: 210 LBS | OXYGEN SATURATION: 98 % | HEART RATE: 60 BPM | SYSTOLIC BLOOD PRESSURE: 145 MMHG | HEIGHT: 66 IN | BODY MASS INDEX: 33.75 KG/M2 | DIASTOLIC BLOOD PRESSURE: 78 MMHG

## 2019-12-17 DIAGNOSIS — N18.5 ANEMIA IN STAGE 5 CHRONIC KIDNEY DISEASE, NOT ON CHRONIC DIALYSIS (H): ICD-10-CM

## 2019-12-17 DIAGNOSIS — N18.5 CKD (CHRONIC KIDNEY DISEASE) STAGE 5, GFR LESS THAN 15 ML/MIN (H): ICD-10-CM

## 2019-12-17 DIAGNOSIS — N18.5 ANEMIA DUE TO STAGE 5 CHRONIC KIDNEY DISEASE, NOT ON CHRONIC DIALYSIS (H): ICD-10-CM

## 2019-12-17 DIAGNOSIS — D63.1 ANEMIA OF CHRONIC RENAL FAILURE, STAGE 5 (H): ICD-10-CM

## 2019-12-17 DIAGNOSIS — D63.1 ANEMIA DUE TO STAGE 5 CHRONIC KIDNEY DISEASE, NOT ON CHRONIC DIALYSIS (H): ICD-10-CM

## 2019-12-17 DIAGNOSIS — N18.5 CKD (CHRONIC KIDNEY DISEASE) STAGE 5, GFR LESS THAN 15 ML/MIN (H): Primary | ICD-10-CM

## 2019-12-17 DIAGNOSIS — N18.5 ANEMIA OF CHRONIC RENAL FAILURE, STAGE 5 (H): ICD-10-CM

## 2019-12-17 DIAGNOSIS — E83.39 HYPERPHOSPHATEMIA: ICD-10-CM

## 2019-12-17 DIAGNOSIS — D63.1 ANEMIA IN STAGE 5 CHRONIC KIDNEY DISEASE, NOT ON CHRONIC DIALYSIS (H): ICD-10-CM

## 2019-12-17 DIAGNOSIS — I10 ESSENTIAL HYPERTENSION: ICD-10-CM

## 2019-12-17 DIAGNOSIS — E55.9 VITAMIN D DEFICIENCY: ICD-10-CM

## 2019-12-17 LAB
ALBUMIN SERPL-MCNC: 3 G/DL (ref 3.4–5)
ANION GAP SERPL CALCULATED.3IONS-SCNC: 8 MMOL/L (ref 3–14)
BUN SERPL-MCNC: 84 MG/DL (ref 7–30)
CALCIUM SERPL-MCNC: 8.5 MG/DL (ref 8.5–10.1)
CHLORIDE SERPL-SCNC: 114 MMOL/L (ref 94–109)
CO2 SERPL-SCNC: 21 MMOL/L (ref 20–32)
CREAT SERPL-MCNC: 5.26 MG/DL (ref 0.52–1.04)
CREAT UR-MCNC: 44 MG/DL
GFR SERPL CREATININE-BSD FRML MDRD: 8 ML/MIN/{1.73_M2}
GLUCOSE SERPL-MCNC: 57 MG/DL (ref 70–99)
HCT VFR BLD AUTO: 31.1 % (ref 35–47)
HGB BLD-MCNC: 9.9 G/DL (ref 11.7–15.7)
PHOSPHATE SERPL-MCNC: 6.4 MG/DL (ref 2.5–4.5)
POTASSIUM SERPL-SCNC: 4.8 MMOL/L (ref 3.4–5.3)
PROT UR-MCNC: 2.88 G/L
PROT/CREAT 24H UR: 6.51 G/G CR (ref 0–0.2)
PTH-INTACT SERPL-MCNC: 350 PG/ML (ref 18–80)
SODIUM SERPL-SCNC: 142 MMOL/L (ref 133–144)

## 2019-12-17 PROCEDURE — 80069 RENAL FUNCTION PANEL: CPT

## 2019-12-17 PROCEDURE — 85014 HEMATOCRIT: CPT

## 2019-12-17 PROCEDURE — 25000128 H RX IP 250 OP 636: Mod: EC

## 2019-12-17 PROCEDURE — 85018 HEMOGLOBIN: CPT

## 2019-12-17 PROCEDURE — 84156 ASSAY OF PROTEIN URINE: CPT

## 2019-12-17 PROCEDURE — 82306 VITAMIN D 25 HYDROXY: CPT

## 2019-12-17 PROCEDURE — G0463 HOSPITAL OUTPT CLINIC VISIT: HCPCS | Mod: ZF

## 2019-12-17 PROCEDURE — 83970 ASSAY OF PARATHORMONE: CPT

## 2019-12-17 PROCEDURE — 36415 COLL VENOUS BLD VENIPUNCTURE: CPT

## 2019-12-17 PROCEDURE — 96372 THER/PROPH/DIAG INJ SC/IM: CPT

## 2019-12-17 RX ORDER — FERROUS SULFATE 325(65) MG
TABLET ORAL
Qty: 180 TABLET | Refills: 1 | Status: SHIPPED | OUTPATIENT
Start: 2019-12-17 | End: 2020-04-01

## 2019-12-17 RX ADMIN — DARBEPOETIN ALFA 40 MCG: 40 INJECTION, SOLUTION INTRAVENOUS; SUBCUTANEOUS at 12:11

## 2019-12-17 ASSESSMENT — PAIN SCALES - GENERAL: PAINLEVEL: MODERATE PAIN (5)

## 2019-12-17 ASSESSMENT — MIFFLIN-ST. JEOR: SCORE: 1489.3

## 2019-12-17 NOTE — LETTER
12/17/2019      RE: Supriya Herr  [address]  [facility] [address]       Nephrology Clinic Visit 12/17/19    Assessment and Plan:     1. CKD5 w/proteinuria- Creat  5.2 with improved volume status, eGFR 8 ml/mn, UPCR 6.28 g/gCr (5/19). No uremic symptoms   - Etiology of CKD felt to be DM, HTN, Vascular dz,    - Remains Inactive on transplant wait list as of 4/18 but working with transplant team to become active on the list.     - Has been seen by Dr Martin and will need AVG for HD.    - Patient and daughter more accepting of Supriya starting HD.    - We have decided that if eGFR is ~ 5 ml/mn, or if feeling unwell, will begin dialysis unit search and schedule her access placement in preparation for HD initiation. She is hoping to be able to find a HD unit where she could run later in the day so she can continue at the day program. We reviewed more extensively today the risk of waiting too long to initiate HD being hospital admission and having a tunneled line placed. Both voice understanding and willing to continue to assess at each visit   - No need to initiate at this time, no uremic symptoms, lytes, bicarb fine    - Does not use NSAIDs   - Blood pressures improved control.     - DM with excellent control. A1c 5.5 % 8/19   - On statin for CV risk reduction     2. Volume status - Improved volume status. Edema has essentially resolved. Blood pressures  improved. Weight per patient today 210# , was 212 # on 9/6/19.     - Continue Lasix 80 mg bid    - Continue Na restricted diet      3. HTN - Uncontrolled  but improved. Clinic blood pressures 130-150/. Day program blood pressures in the 140-150/ range, but no way to evaluate device.   Current regimen:    - lasix 80 bid   - Coreg 25 mg bid   - Amlodipine 5 mg bid    - Continue home monitoring   - Blood pressure goal < 130/80     4. Electrolytes - No acute concerns. K 4.8, Na 142     5. Acid base - No acute concerns. Bicarb 21     6. BMD - Ca corrected  9.0, Phos 6.4, Albumin 3.0   - Vit D 27,  ()   - Continue Vit D 2000 U every day, but will hold Calcitriol given recent hypercalcemia   - Continue Renvela 800 mg TID with meals.      7. Anemia - Hgb 9.9    - Iron studies : Ferritin 600, Fe 66, IS 29.    - Has been enrolled in anemia management. Given Darbo 40 mcg today   - Colonoscopy 2018, tubular adenomas     8. DM2 - Well controlled. A1c 5.5 %. On Insulin      9. Depression - Controlled on Zoloft 50 mg every day.       10. Disposition - RTC 6 wks for f/u     Assessment and plan was discussed with patient and she voiced her understanding and agreement.     Reason for Visit:  CKD5/HTN f/u     HPI:  Ms Herr is a 68 yo female with CKD5 and nephrotic range proteinuria, DM2, HTN, in today for routine CKD f/u. Last seen by me on 10/16/19. Decision was made to have access placed and begin search for HD unit when GFR is 5.  Furosemide increased to 80 mg bid last visit. Renvela also started at last visit       Interval Hx:   No hospital admissions     ROS:   Edema has resolved since increasing her Furosemide.   Blood pressures improved. Home readings 140-150/.   No further dyspnea. .   Keeping her leg elevated more consistently  Has good appetite. Denies chest pain, abdominal pain. No bowel or bladder concerns. Decided to not get a new prosthesis.      Chronic Health Problems:     CKD5  Proteinuria  AKA, left (10/18)  T2DM  Vit D def  HTN  HLD  Anxiety/depression  Vitiligo  Non proliferative diabetic retinopathy  BCC  JONE  Anemia  Prior tobacco abuse     Personal Hx:   Lives in lower level of [facility], daughter Caroline upper level. NS. Attends day program at [facility] Monday through Thursday    Family Hx:   Family History   Problem Relation Age of Onset     Diabetes Mother      Hypertension Mother      Heart Disease Mother          of congestive heart failure     Eye Disorder Mother      Arthritis Mother      Obesity Mother      Heart Disease Father           from CHF     Cerebrovascular Disease Father      Arthritis Father      Musculoskeletal Disorder Other         has MS     Thyroid Disease Other      Eye Disorder Other         cataracts     Cancer Other         throat/liver     Skin Cancer No family hx of      Melanoma No family hx of      Glaucoma No family hx of      Macular Degeneration No family hx of          Allergies:  Allergies   Allergen Reactions     Penicillins Rash     Unasyn Rash     No evidence SJS, but very uncomfortable and precipitated multiple provider visits. Would not use penicillins again if other options available.        Medications:  Current Outpatient Medications   Medication Sig     acetaminophen (TYLENOL) 325 MG tablet Take 2 tablets (650 mg) by mouth every 4 hours as needed for mild pain     albuterol (PROAIR HFA, PROVENTIL HFA, VENTOLIN HFA) 108 (90 BASE) MCG/ACT inhaler Inhale 2 puffs into the lungs every 6 hours as needed for shortness of breath / dyspnea or wheezing     amLODIPine (NORVASC) 5 MG tablet Take 1 tablet (5 mg) by mouth 2 times daily     atorvastatin (LIPITOR) 20 MG tablet TAKE 1 TABLET BY MOUTH ONCE DAILY     blood glucose (ONETOUCH ULTRA) test strip TEST YOUR BLOOD SUGAR 3-4 TIMES PER DAY.     carvedilol (COREG) 25 MG tablet Take 1 tablet (25 mg) by mouth 2 times daily (with meals)     cetirizine (ZYRTEC) 10 MG tablet Take 1 tablet (10 mg) by mouth daily     clindamycin (CLINDAMAX) 1 % topical gel Apply topically 2 times daily     furosemide (LASIX) 40 MG tablet Take 2 tablets (80 mg) by mouth 2 times daily     insulin aspart (NOVOLOG FLEXPEN) 100 UNIT/ML pen INJECT 4 UNITS SUBCUTANEOUSLY WITH BREAKFAST, LUNCH AND DINNER.     insulin glargine (BASAGLAR KWIKPEN) 100 UNIT/ML pen Inject 22 Units Subcutaneous At Bedtime Inject 22 U SubCut at HS     insulin pen needle (ULTICARE MINI) 31G X 6 MM Use daily or as directed.     order for DME Compression socks - knee  15-20 mmHg  Open toed.  Use daily.     order for DME  "Equipment being ordered: mattress overlay for hospital bed  Wt. 192#  Height 5'5\"  99 months/Lifetime     order for DME Equipment being ordered: Mattress Overlay for Hospital Bed. Height 65. Weight 168 lbs.  Length of need: Lifetime     order for DME 1 SAD light     order for DME 1 wheelchair     sertraline (ZOLOFT) 50 MG tablet Take 1 tablet (50 mg) by mouth daily     sevelamer carbonate, RENVELA, (RENVELA) 0.8 GM PACK Packet Take 1 packet (0.8 g) by mouth 3 times daily (with meals)     triamcinolone (KENALOG) 0.1 % external cream Apply to sites of dermatitis- the abdomen, armpits, groin as needed.     vitamin D3 (CHOLECALCIFEROL) 2000 units (50 mcg) tablet Take 1 tablet (2,000 Units) by mouth daily     ferrous sulfate (FEROSUL) 325 (65 Fe) MG tablet TAKE 1 TABLET (325 MG) BY MOUTH 2 TIMES DAILY     Current Facility-Administered Medications   Medication     bevacizumab (AVASTIN) intravitreal inj 1.25 mg     darbepoetin candy-polysorbate (ARANESP) injection 40 mcg      Vitals:  B/P 137-154/61-78  P 66  WT: patient reported 95.3 kg    Exam:  GEN: Pleasant female in NAD. Daughter present.  CARDIAC: RRR  LUNGS: CTA  ABDOMEN: Soft NT  EXT: Left LORI, Right no edema  NEURO: alert. Oriented. No asterixis    LABS:   CMP  Recent Labs   Lab Test 12/17/19  1010 12/02/19  0947 10/30/19  1440 10/16/19  1513    141 142 142   POTASSIUM 4.8 5.1 4.5 4.6   CHLORIDE 114* 111* 110* 114*   CO2 21 24 21 20   ANIONGAP 8 6 10 8   GLC 57* 90 151* 133*   BUN 84* 83* 106* 88*   CR 5.26* 5.08* 5.10* 4.62*   GFRESTIMATED 8* 8* 8* 9*   GFRESTBLACK 9* 9* 9* 10*   JODY 8.5 8.7 7.7* 8.2*     Recent Labs   Lab Test 07/31/18  1148 07/24/18  0900 07/17/18  0830 06/24/18  0623 06/23/18  2347 03/29/18  1410 11/13/17  0715   BILITOTAL  --   --   --  0.2 0.2 0.2 0.3   ALKPHOS  --   --   --  49 56 56 70   ALT  --   --   --  17 13 15 37   AST 24 24 12 16 12 14 30     CBC  Recent Labs   Lab Test 12/17/19  1010 12/02/19  0947 11/12/19  1520 10/30/19  1440 "  07/09/19  1513  02/15/19  1459 01/18/19  1457 12/07/18  1411   HGB 9.9* 10.3* 10.7* 9.0*   < > 9.3*   < > 10.3* 10.2* 10.3*   WBC  --   --   --   --   --  6.3  --  5.5 5.4 5.7   RBC  --   --   --   --   --  3.07*  --  3.40* 3.47* 3.47*   HCT 31.1* 32.4* 32.6* 27.9*   < > 29.7*  --  31.6* 32.4* 32.3*   MCV  --   --   --   --   --  97  --  93 93 93   MCH  --   --   --   --   --  30.3  --  30.3 29.4 29.7   MCHC  --   --   --   --   --  31.3*  --  32.6 31.5 31.9   RDW  --   --   --   --   --  12.9  --  12.5 12.4 12.4   PLT  --   --   --   --   --  202  --  232 222 237    < > = values in this interval not displayed.     URINE STUDIES  Recent Labs   Lab Test 03/29/18  1448 01/25/18  0233 11/02/17  0602 10/26/16  1220  12/05/12  1541   COLOR Straw Light Yellow Light Yellow Yellow   < > Yellow   APPEARANCE Clear Clear Clear Slightly Cloudy   < > Clear   URINEGLC 50* Negative 300* >500*   < > 100*   URINEBILI Negative Negative Negative Negative   < > Negative   URINEKETONE Negative Negative Negative Negative   < > Negative   SG 1.008 1.007 1.010 1.014   < > 1.025   UBLD Negative Negative Negative Negative   < > Small*   URINEPH 5.0 6.5 7.5* 6.0   < > 6.0   PROTEIN >499* 100* >600* >500*   < > 100*   UROBILINOGEN  --   --   --   --   --  0.2   NITRITE Negative Negative Negative Negative   < > Negative   LEUKEST Negative Negative Small* Small*   < > Negative   RBCU 1 0 1 5*   < >  --    WBCU 2 1 44* 54*   < >  --     < > = values in this interval not displayed.     Recent Labs   Lab Test 12/17/19  1013 05/24/19  1420 12/07/18  1417 11/01/18  1533   UTPG 6.51* 6.28* 5.60* 6.71*     PTH  Recent Labs   Lab Test 12/17/19  1010 01/18/19  1457 10/17/18  1413   PTHI 350* 126* 127*     IRON STUDIES  Recent Labs   Lab Test 12/02/19  0947 11/12/19  1520 10/30/19  1440   IRON 66 63 54   * 227* 221*   IRONSAT 29 28 24   ELENA 600* 518* 412*       Silva Guerrero, APRN, CNP

## 2019-12-17 NOTE — LETTER
12/17/2019       RE: Supriya Herr  [address]  [facility] [address]     Dear Colleague,    Thank you for referring your patient, Supriya Herr, to the [facility] NEPHROLOGY at [facility]. Please see a copy of my visit note below.    Nephrology Clinic Visit 12/17/19    Assessment and Plan:     1. CKD5 w/proteinuria- Creat  5.2 with improved volume status, eGFR 8 ml/mn, UPCR 6.28 g/gCr (5/19). No uremic symptoms   - Etiology of CKD felt to be DM, HTN, Vascular dz,    - Remains Inactive on transplant wait list as of 4/18 but working with transplant team to become active on the list.     - Has been seen by Dr Martin and will need AVG for HD.    - Patient and daughter more accepting of Supriya starting HD.    - We have decided that if eGFR is ~ 5 ml/mn, or if feeling unwell, will begin dialysis unit search and schedule her access placement in preparation for HD initiation. She is hoping to be able to find a HD unit where she could run later in the day so she can continue at the day program. We reviewed more extensively today the risk of waiting too long to initiate HD being hospital admission and having a tunneled line placed. Both voice understanding and willing to continue to assess at each visit   - No need to initiate at this time, no uremic symptoms, lytes, bicarb fine    - Does not use NSAIDs   - Blood pressures improved control.     - DM with excellent control. A1c 5.5 % 8/19   - On statin for CV risk reduction     2. Volume status - Improved volume status. Edema has essentially resolved. Blood pressures  improved. Weight per patient today 210# , was 212 # on 9/6/19.     - Continue Lasix 80 mg bid    - Continue Na restricted diet      3. HTN - Uncontrolled  but improved. Clinic blood pressures 130-150/. Day program blood pressures in the 140-150/ range, but no way to evaluate device.   Current regimen:    - lasix 80 bid   - Coreg 25 mg bid   - Amlodipine 5 mg  bid    - Continue home monitoring   - Blood pressure goal < 130/80     4. Electrolytes - No acute concerns. K 4.8, Na 142     5. Acid base - No acute concerns. Bicarb 21     6. BMD - Ca corrected 9.0, Phos 6.4, Albumin 3.0   - Vit D 27,  (1/19)   - Continue Vit D 2000 U every day, but will hold Calcitriol given recent hypercalcemia   - Continue Renvela 800 mg TID with meals.      7. Anemia - Hgb 9.9    - Iron studies 12/19: Ferritin 600, Fe 66, IS 29.    - Has been enrolled in anemia management. Given Darbo 40 mcg today   - Colonoscopy 2018, tubular adenomas     8. DM2 - Well controlled. A1c 5.5 %. On Insulin      9. Depression - Controlled on Zoloft 50 mg every day.       10. Disposition - RTC 6 wks for f/u     Assessment and plan was discussed with patient and she voiced her understanding and agreement.     Reason for Visit:  CKD5/HTN f/u     HPI:  Ms Herr is a 68 yo female with CKD5 and nephrotic range proteinuria, DM2, HTN, in today for routine CKD f/u. Last seen by me on 10/16/19. Decision was made to have access placed and begin search for HD unit when GFR is 5.  Furosemide increased to 80 mg bid last visit. Renvela also started at last visit       Interval Hx:   No hospital admissions     ROS:   Edema has resolved since increasing her Furosemide.   Blood pressures improved. Home readings 140-150/.   No further dyspnea. .   Keeping her leg elevated more consistently  Has good appetite. Denies chest pain, abdominal pain. No bowel or bladder concerns. Decided to not get a new prosthesis.      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 NS. Attends day program at [employer] Monday through Thursday    Family Hx:   Family History   Problem Relation Age of Onset     Diabetes  Mother      Hypertension Mother      Heart Disease Mother          of congestive heart failure     Eye Disorder Mother      Arthritis Mother      Obesity Mother      Heart Disease Father          from CHF     Cerebrovascular Disease Father      Arthritis Father      Musculoskeletal Disorder Other         has MS     Thyroid Disease Other      Eye Disorder Other         cataracts     Cancer Other         throat/liver     Skin Cancer No family hx of      Melanoma No family hx of      Glaucoma No family hx of      Macular Degeneration No family hx of          Allergies:  Allergies   Allergen Reactions     Penicillins Rash     Unasyn Rash     No evidence SJS, but very uncomfortable and precipitated multiple provider visits. Would not use penicillins again if other options available.        Medications:  Current Outpatient Medications   Medication Sig     acetaminophen (TYLENOL) 325 MG tablet Take 2 tablets (650 mg) by mouth every 4 hours as needed for mild pain     albuterol (PROAIR HFA, PROVENTIL HFA, VENTOLIN HFA) 108 (90 BASE) MCG/ACT inhaler Inhale 2 puffs into the lungs every 6 hours as needed for shortness of breath / dyspnea or wheezing     amLODIPine (NORVASC) 5 MG tablet Take 1 tablet (5 mg) by mouth 2 times daily     atorvastatin (LIPITOR) 20 MG tablet TAKE 1 TABLET BY MOUTH ONCE DAILY     blood glucose (ONETOUCH ULTRA) test strip TEST YOUR BLOOD SUGAR 3-4 TIMES PER DAY.     carvedilol (COREG) 25 MG tablet Take 1 tablet (25 mg) by mouth 2 times daily (with meals)     cetirizine (ZYRTEC) 10 MG tablet Take 1 tablet (10 mg) by mouth daily     clindamycin (CLINDAMAX) 1 % topical gel Apply topically 2 times daily     furosemide (LASIX) 40 MG tablet Take 2 tablets (80 mg) by mouth 2 times daily     insulin aspart (NOVOLOG FLEXPEN) 100 UNIT/ML pen INJECT 4 UNITS SUBCUTANEOUSLY WITH BREAKFAST, LUNCH AND DINNER.     insulin glargine (BASAGLAR KWIKPEN) 100 UNIT/ML pen Inject 22 Units Subcutaneous At Bedtime  "Inject 22 U SubCut at HS     insulin pen needle (ULTICARE MINI) 31G X 6 MM Use daily or as directed.     order for DME Compression socks - knee  15-20 mmHg  Open toed.  Use daily.     order for DME Equipment being ordered: mattress overlay for hospital bed  Wt. 192#  Height 5'5\"  99 months/Lifetime     order for DME Equipment being ordered: Mattress Overlay for Hospital Bed. Height 65. Weight 168 lbs.  Length of need: Lifetime     order for DME 1 SAD light     order for DME 1 wheelchair     sertraline (ZOLOFT) 50 MG tablet Take 1 tablet (50 mg) by mouth daily     sevelamer carbonate, RENVELA, (RENVELA) 0.8 GM PACK Packet Take 1 packet (0.8 g) by mouth 3 times daily (with meals)     triamcinolone (KENALOG) 0.1 % external cream Apply to sites of dermatitis- the abdomen, armpits, groin as needed.     vitamin D3 (CHOLECALCIFEROL) 2000 units (50 mcg) tablet Take 1 tablet (2,000 Units) by mouth daily     ferrous sulfate (FEROSUL) 325 (65 Fe) MG tablet TAKE 1 TABLET (325 MG) BY MOUTH 2 TIMES DAILY     Current Facility-Administered Medications   Medication     bevacizumab (AVASTIN) intravitreal inj 1.25 mg     darbepoetin candy-polysorbate (ARANESP) injection 40 mcg      Vitals:  B/P 137-154/61-78  P 66  WT: patient reported 95.3 kg    Exam:  GEN: Pleasant female in NAD. Daughter present.  CARDIAC: RRR  LUNGS: CTA  ABDOMEN: Soft NT  EXT: Left LORI, Right no edema  NEURO: alert. Oriented. No asterixis    LABS:   CMP  Recent Labs   Lab Test 12/17/19  1010 12/02/19  0947 10/30/19  1440 10/16/19  1513    141 142 142   POTASSIUM 4.8 5.1 4.5 4.6   CHLORIDE 114* 111* 110* 114*   CO2 21 24 21 20   ANIONGAP 8 6 10 8   GLC 57* 90 151* 133*   BUN 84* 83* 106* 88*   CR 5.26* 5.08* 5.10* 4.62*   GFRESTIMATED 8* 8* 8* 9*   GFRESTBLACK 9* 9* 9* 10*   JODY 8.5 8.7 7.7* 8.2*     Recent Labs   Lab Test 07/31/18  1148 07/24/18  0900 07/17/18  0830 06/24/18  0623 06/23/18  2347 03/29/18  1410 11/13/17  0715   BILITOTAL  --   --   --  0.2 " 0.2 0.2 0.3   ALKPHOS  --   --   --  49 56 56 70   ALT  --   --   --  17 13 15 37   AST 24 24 12 16 12 14 30     CBC  Recent Labs   Lab Test 12/17/19  1010 12/02/19  0947 11/12/19  1520 10/30/19  1440  07/09/19  1513  02/15/19  1459 01/18/19  1457 12/07/18  1411   HGB 9.9* 10.3* 10.7* 9.0*   < > 9.3*   < > 10.3* 10.2* 10.3*   WBC  --   --   --   --   --  6.3  --  5.5 5.4 5.7   RBC  --   --   --   --   --  3.07*  --  3.40* 3.47* 3.47*   HCT 31.1* 32.4* 32.6* 27.9*   < > 29.7*  --  31.6* 32.4* 32.3*   MCV  --   --   --   --   --  97  --  93 93 93   MCH  --   --   --   --   --  30.3  --  30.3 29.4 29.7   MCHC  --   --   --   --   --  31.3*  --  32.6 31.5 31.9   RDW  --   --   --   --   --  12.9  --  12.5 12.4 12.4   PLT  --   --   --   --   --  202  --  232 222 237    < > = values in this interval not displayed.     URINE STUDIES  Recent Labs   Lab Test 03/29/18  1448 01/25/18  0233 11/02/17  0602 10/26/16  1220  12/05/12  1541   COLOR Straw Light Yellow Light Yellow Yellow   < > Yellow   APPEARANCE Clear Clear Clear Slightly Cloudy   < > Clear   URINEGLC 50* Negative 300* >500*   < > 100*   URINEBILI Negative Negative Negative Negative   < > Negative   URINEKETONE Negative Negative Negative Negative   < > Negative   SG 1.008 1.007 1.010 1.014   < > 1.025   UBLD Negative Negative Negative Negative   < > Small*   URINEPH 5.0 6.5 7.5* 6.0   < > 6.0   PROTEIN >499* 100* >600* >500*   < > 100*   UROBILINOGEN  --   --   --   --   --  0.2   NITRITE Negative Negative Negative Negative   < > Negative   LEUKEST Negative Negative Small* Small*   < > Negative   RBCU 1 0 1 5*   < >  --    WBCU 2 1 44* 54*   < >  --     < > = values in this interval not displayed.     Recent Labs   Lab Test 12/17/19  1013 05/24/19  1420 12/07/18  1417 11/01/18  1533   UTPG 6.51* 6.28* 5.60* 6.71*     PTH  Recent Labs   Lab Test 12/17/19  1010 01/18/19  1457 10/17/18  1413   PTHI 350* 126* 127*     IRON STUDIES  Recent Labs   Lab Test 12/02/19  0947  11/12/19  1520 10/30/19  1440   IRON 66 63 54   * 227* 221*   IRONSAT 29 28 24   ELENA 600* 518* 412*       Silva Guerrero, APRN, CNP

## 2019-12-17 NOTE — TELEPHONE ENCOUNTER
Anemia Management Note  SUBJECTIVE/OBJECTIVE:  Referred by Ethel Guerrero NP on 2020  Primary Diagnosis: Anemia in Chronic Kidney Disease (N18.5, D63.1)     Secondary Diagnosis:  Chronic Kidney Disease, Stage 5 (N18.5)  Hgb goal range:  9-10   Epo/Darbo: Aranesp 40mcg every 14 days for Hgb <10. In Clinic  Iron regimen:  Ferrous Sulfate daily. (19)  Labs : 2020  Recent KELLIE use, transfusion, IV iron: Unknown  RX/TX plans : 10/06/2020  No history of stroke and blood clots.  Hx of HTN, CHF and Basal Cell Carcinoma ()        Contact:            Ok to leave message regarding Medical, Billing and Scheduling information per consent to communicate dated 10/19/2015                              OK to speak with Caroline Sandoval (daughter) and Caroline Baird (sister) regarding Medical, Billing and Scheduling information per consent to communicate dated 10/19/2015    Anemia Latest Ref Rng & Units 2019 2019 10/16/2019 10/30/2019 2019 2019 2019   KELLIE Dose - - - 40mcg 40mcg - - 40mcg   Hemoglobin 11.7 - 15.7 g/dL 8.9(L) 8.7(L) 8.8(L) 9.0(L) 10.7(L) 10.3(L) 9.9(L)   TSAT 15 - 46 % - 22 21 24 28 29 -   Ferritin 8 - 252 ng/mL - 557(H) 524(H) 412(H) 518(H) 600(H) -     BP Readings from Last 3 Encounters:   19 (!) 145/78   19 (!) 147/70   19 (!) 148/70     Wt Readings from Last 2 Encounters:   19 95.3 kg (210 lb)   19 95.7 kg (211 lb)           ASSESSMENT:  Hgb:At goal - recommend dose  TSat: not at goal (>30%) but ferritin >500ng/mL.  IV iron not indicated at this time per anemia protocol. Ferritin: At goal (>100ng/mL)    PLAN:  Dose with aranesp and RTC for hgb then aranesp if needed in 2 week(s)    Orders needed to be renewed (for next follow-up date) in EPIC: None    Iron labs due:  2019    Plan discussed with:  NO call made, appt is sched for 1/3/2020  Plan provided by:  mingo    NEXT FOLLOW-UP DATE:  1/3/2020    Leslie Rivas RN   Anemia  Services  [address] [address]   [email or web address]   Office : [phone]  Fax: [phone]

## 2019-12-18 LAB — DEPRECATED CALCIDIOL+CALCIFEROL SERPL-MC: 27 UG/L (ref 20–75)

## 2019-12-18 NOTE — PROGRESS NOTES
Nephrology Clinic Visit 12/17/19    Assessment and Plan:     1. CKD5 w/proteinuria- Creat 5.2 with improved volume status, eGFR 8 ml/mn, UPCR 6.28 g/gCr (5/19). No uremic symptoms   - Etiology of CKD felt to be DM, HTN, Vascular dz,    - Remains Inactive on transplant wait list as of 4/18 but working with transplant team to become active on the list.     - Has been seen by Dr Martin and will need AVG for HD.    - Patient and daughter more accepting of Supriya starting HD.    - We have decided that if eGFR is ~ 5 ml/mn, or if feeling unwell, will begin dialysis unit search and schedule her access placement in preparation for HD initiation. She is hoping to be able to find a HD unit where she could run later in the day so she can continue at the day program. We reviewed more extensively today the risk of waiting too long to initiate HD being hospital admission and having a tunneled line placed. Both voice understanding and willing to continue to assess at each visit   - No need to initiate at this time, no uremic symptoms, lytes, bicarb fine    - Does not use NSAIDs   - Blood pressures improved control.     - DM with excellent control. A1c 5.5 % 8/19   - On statin for CV risk reduction     2. Volume status - Improved volume status. Edema has essentially resolved. Blood pressures improved. Weight per patient today 210# , was 212 # on 9/6/19.     - Continue Lasix 80 mg bid    - Continue Na restricted diet      3. HTN - Uncontrolled but improved. Clinic blood pressures 130-150/. Day program blood pressures in the 140-150/ range, but no way to evaluate device.   Current regimen:    - lasix 80 bid   - Coreg 25 mg bid   - Amlodipine 5 mg bid    - Continue home monitoring   - Blood pressure goal < 130/80     4. Electrolytes - No acute concerns. K 4.8, Na 142     5. Acid base - No acute concerns. Bicarb 21     6. BMD - Ca corrected 9.0, Phos 6.4, Albumin 3.0   - Vit D 27,  (1/19)   - Continue Vit D 2000 U every day,  but will hold Calcitriol given recent hypercalcemia   - Continue Renvela 800 mg TID with meals.      7. Anemia - Hgb 9.9    - Iron studies : Ferritin 600, Fe 66, IS 29.    - Has been enrolled in anemia management. Given Darbo 40 mcg today   - Colonoscopy 2018, tubular adenomas     8. DM2 - Well controlled. A1c 5.5 %. On Insulin      9. Depression - Controlled on Zoloft 50 mg every day.       10. Disposition - RTC 6 wks for f/u     Assessment and plan was discussed with patient and she voiced her understanding and agreement.     Reason for Visit:  CKD5/HTN f/u     HPI:  Ms Herr is a 68 yo female with CKD5 and nephrotic range proteinuria, DM2, HTN, in today for routine CKD f/u. Last seen by me on 10/16/19. Decision was made to have access placed and begin search for HD unit when GFR is 5.  Furosemide increased to 80 mg bid last visit. Renvela also started at last visit       Interval Hx:   No hospital admissions     ROS:   Edema has resolved since increasing her Furosemide.   Blood pressures improved. Home readings 140-150/.   No further dyspnea. .   Keeping her leg elevated more consistently  Has good appetite. Denies chest pain, abdominal pain. No bowel or bladder concerns. Decided to not get a new prosthesis.      Chronic Health Problems:     CKD5  Proteinuria  AKA, left (10/18)  T2DM  Vit D def  HTN  HLD  Anxiety/depression  Vitiligo  Non proliferative diabetic retinopathy  BCC  JONE  Anemia  Prior tobacco abuse     Personal Hx:   Lives in [facility], daughter Caroline upper [facility]. NS. Attends day program at [employer] [facility] Monday through Thursday    Family Hx:   Family History   Problem Relation Age of Onset     Diabetes Mother      Hypertension Mother      Heart Disease Mother          of congestive heart failure     Eye Disorder Mother      Arthritis Mother      Obesity Mother      Heart Disease Father          from CHF     Cerebrovascular Disease Father      Arthritis Father       Musculoskeletal Disorder Other         has MS     Thyroid Disease Other      Eye Disorder Other         cataracts     Cancer Other         throat/liver     Skin Cancer No family hx of      Melanoma No family hx of      Glaucoma No family hx of      Macular Degeneration No family hx of          Allergies:  Allergies   Allergen Reactions     Penicillins Rash     Unasyn Rash     No evidence SJS, but very uncomfortable and precipitated multiple provider visits. Would not use penicillins again if other options available.        Medications:  Current Outpatient Medications   Medication Sig     acetaminophen (TYLENOL) 325 MG tablet Take 2 tablets (650 mg) by mouth every 4 hours as needed for mild pain     albuterol (PROAIR HFA, PROVENTIL HFA, VENTOLIN HFA) 108 (90 BASE) MCG/ACT inhaler Inhale 2 puffs into the lungs every 6 hours as needed for shortness of breath / dyspnea or wheezing     amLODIPine (NORVASC) 5 MG tablet Take 1 tablet (5 mg) by mouth 2 times daily     atorvastatin (LIPITOR) 20 MG tablet TAKE 1 TABLET BY MOUTH ONCE DAILY     blood glucose (ONETOUCH ULTRA) test strip TEST YOUR BLOOD SUGAR 3-4 TIMES PER DAY.     carvedilol (COREG) 25 MG tablet Take 1 tablet (25 mg) by mouth 2 times daily (with meals)     cetirizine (ZYRTEC) 10 MG tablet Take 1 tablet (10 mg) by mouth daily     clindamycin (CLINDAMAX) 1 % topical gel Apply topically 2 times daily     furosemide (LASIX) 40 MG tablet Take 2 tablets (80 mg) by mouth 2 times daily     insulin aspart (NOVOLOG FLEXPEN) 100 UNIT/ML pen INJECT 4 UNITS SUBCUTANEOUSLY WITH BREAKFAST, LUNCH AND DINNER.     insulin glargine (BASAGLAR KWIKPEN) 100 UNIT/ML pen Inject 22 Units Subcutaneous At Bedtime Inject 22 U SubCut at HS     insulin pen needle (ULTICARE MINI) 31G X 6 MM Use daily or as directed.     order for DME Compression socks - knee  15-20 mmHg  Open toed.  Use daily.     order for DME Equipment being ordered: mattress overlay for hospital bed  Wt. 192#  Height  "5'5\"  99 months/Lifetime     order for DME Equipment being ordered: Mattress Overlay for Hospital Bed. Height 65. Weight 168 lbs.  Length of need: Lifetime     order for DME 1 SAD light     order for DME 1 wheelchair     sertraline (ZOLOFT) 50 MG tablet Take 1 tablet (50 mg) by mouth daily     sevelamer carbonate, RENVELA, (RENVELA) 0.8 GM PACK Packet Take 1 packet (0.8 g) by mouth 3 times daily (with meals)     triamcinolone (KENALOG) 0.1 % external cream Apply to sites of dermatitis- the abdomen, armpits, groin as needed.     vitamin D3 (CHOLECALCIFEROL) 2000 units (50 mcg) tablet Take 1 tablet (2,000 Units) by mouth daily     ferrous sulfate (FEROSUL) 325 (65 Fe) MG tablet TAKE 1 TABLET (325 MG) BY MOUTH 2 TIMES DAILY     Current Facility-Administered Medications   Medication     bevacizumab (AVASTIN) intravitreal inj 1.25 mg     darbepoetin candy-polysorbate (ARANESP) injection 40 mcg      Vitals:  B/P 137-154/61-78  P 66  WT: patient reported 95.3 kg    Exam:  GEN: Pleasant female in NAD. Daughter present.  CARDIAC: RRR  LUNGS: CTA  ABDOMEN: Soft NT  EXT: Left LORI, Right no edema  NEURO: alert. Oriented. No asterixis    LABS:   CMP  Recent Labs   Lab Test 12/17/19  1010 12/02/19  0947 10/30/19  1440 10/16/19  1513    141 142 142   POTASSIUM 4.8 5.1 4.5 4.6   CHLORIDE 114* 111* 110* 114*   CO2 21 24 21 20   ANIONGAP 8 6 10 8   GLC 57* 90 151* 133*   BUN 84* 83* 106* 88*   CR 5.26* 5.08* 5.10* 4.62*   GFRESTIMATED 8* 8* 8* 9*   GFRESTBLACK 9* 9* 9* 10*   JODY 8.5 8.7 7.7* 8.2*     Recent Labs   Lab Test 07/31/18  1148 07/24/18  0900 07/17/18  0830 06/24/18  0623 06/23/18  2347 03/29/18  1410 11/13/17  0715   BILITOTAL  --   --   --  0.2 0.2 0.2 0.3   ALKPHOS  --   --   --  49 56 56 70   ALT  --   --   --  17 13 15 37   AST 24 24 12 16 12 14 30     CBC  Recent Labs   Lab Test 12/17/19  1010 12/02/19  0947 11/12/19  1520 10/30/19  1440  07/09/19  1513  02/15/19  1459 01/18/19  1457 12/07/18  1411   HGB 9.9* " 10.3* 10.7* 9.0*   < > 9.3*   < > 10.3* 10.2* 10.3*   WBC  --   --   --   --   --  6.3  --  5.5 5.4 5.7   RBC  --   --   --   --   --  3.07*  --  3.40* 3.47* 3.47*   HCT 31.1* 32.4* 32.6* 27.9*   < > 29.7*  --  31.6* 32.4* 32.3*   MCV  --   --   --   --   --  97  --  93 93 93   MCH  --   --   --   --   --  30.3  --  30.3 29.4 29.7   MCHC  --   --   --   --   --  31.3*  --  32.6 31.5 31.9   RDW  --   --   --   --   --  12.9  --  12.5 12.4 12.4   PLT  --   --   --   --   --  202  --  232 222 237    < > = values in this interval not displayed.     URINE STUDIES  Recent Labs   Lab Test 03/29/18  1448 01/25/18  0233 11/02/17  0602 10/26/16  1220  12/05/12  1541   COLOR Straw Light Yellow Light Yellow Yellow   < > Yellow   APPEARANCE Clear Clear Clear Slightly Cloudy   < > Clear   URINEGLC 50* Negative 300* >500*   < > 100*   URINEBILI Negative Negative Negative Negative   < > Negative   URINEKETONE Negative Negative Negative Negative   < > Negative   SG 1.008 1.007 1.010 1.014   < > 1.025   UBLD Negative Negative Negative Negative   < > Small*   URINEPH 5.0 6.5 7.5* 6.0   < > 6.0   PROTEIN >499* 100* >600* >500*   < > 100*   UROBILINOGEN  --   --   --   --   --  0.2   NITRITE Negative Negative Negative Negative   < > Negative   LEUKEST Negative Negative Small* Small*   < > Negative   RBCU 1 0 1 5*   < >  --    WBCU 2 1 44* 54*   < >  --     < > = values in this interval not displayed.     Recent Labs   Lab Test 12/17/19  1013 05/24/19  1420 12/07/18  1417 11/01/18  1533   UTPG 6.51* 6.28* 5.60* 6.71*     PTH  Recent Labs   Lab Test 12/17/19  1010 01/18/19  1457 10/17/18  1413   PTHI 350* 126* 127*     IRON STUDIES  Recent Labs   Lab Test 12/02/19  0947 11/12/19  1520 10/30/19  1440   IRON 66 63 54   * 227* 221*   IRONSAT 29 28 24   ELENA 600* 518* 412*       Silva Guerrero, APRN, CNP

## 2019-12-18 NOTE — TELEPHONE ENCOUNTER
"Addition message sent through [email or web address]    \"Hi Cheryl, this is Caroline again.  Apparently my mom needs orders for her wound on her bottom at [facility]?  I would have thought this was part of her transfer orders as the hospital also noted the wound on her butt and again somehow I guess they need instruction as to how to care for her wound - it's a pressure sore and her now being non-weight being I think it may get worse.  I would \"assume\" they are checking her out at [facility], but I am not confident she is getting the treatment she needs for this.  Could you advise them of this and steps to correct?  My mom said that she is using a pillow to help, but I'm not sure if they need to be doing more than this as well?\"    Called Orly from [facility] regarding butt wound. Orly unaware that patient had wound on her butt. Instructed Orly to go look at and assess the wound and determine what kind of order would be needed in order to provide correct wound care for this patient.    Asked Orly if she had reassessed patient's blood pressure. Orly stated patient has refused a blood pressure check in the afternoon as she was resting.    Orly will return call to clinic with wound assessment.    Cheryl Law RN  [facility]    " Reorder echocardiogram now

## 2019-12-24 ENCOUNTER — TELEPHONE (OUTPATIENT)
Dept: FAMILY MEDICINE | Facility: CLINIC | Age: 69
End: 2019-12-24

## 2019-12-26 ENCOUNTER — HOSPITAL ENCOUNTER (OUTPATIENT)
Dept: MAMMOGRAPHY | Facility: CLINIC | Age: 69
Discharge: HOME | End: 2019-12-26
Attending: FAMILY MEDICINE | Admitting: FAMILY MEDICINE
Payer: MEDICARE

## 2019-12-26 ENCOUNTER — MEDICAL CORRESPONDENCE (OUTPATIENT)
Dept: HEALTH INFORMATION MANAGEMENT | Facility: CLINIC | Age: 69
End: 2019-12-26

## 2019-12-26 DIAGNOSIS — Z12.31 VISIT FOR SCREENING MAMMOGRAM: ICD-10-CM

## 2019-12-26 PROCEDURE — 77063 BREAST TOMOSYNTHESIS BI: CPT

## 2019-12-27 ENCOUNTER — TELEPHONE (OUTPATIENT)
Dept: TRANSPLANT | Facility: CLINIC | Age: 69
End: 2019-12-27

## 2019-12-27 NOTE — TELEPHONE ENCOUNTER
Attempted to contact patient via telephone to follow up on patient's thoughts on going through with a transplant. Voicemail box was not set up at time of call. Will attempt again at a later time/date.     Alicia [place] [facility]    Kidney/Pancreas/Auto Islet Transplant Programs

## 2020-01-03 ENCOUNTER — OFFICE VISIT (OUTPATIENT)
Dept: DERMATOLOGY | Facility: CLINIC | Age: 70
End: 2020-01-03
Attending: PHYSICIAN ASSISTANT
Payer: MEDICARE

## 2020-01-03 ENCOUNTER — APPOINTMENT (OUTPATIENT)
Dept: TRANSPLANT | Facility: CLINIC | Age: 70
End: 2020-01-03
Payer: MEDICARE

## 2020-01-03 ENCOUNTER — TELEPHONE (OUTPATIENT)
Dept: DERMATOLOGY | Facility: CLINIC | Age: 70
End: 2020-01-03

## 2020-01-03 ENCOUNTER — OFFICE VISIT (OUTPATIENT)
Dept: ORTHOPEDICS | Facility: CLINIC | Age: 70
End: 2020-01-03
Payer: MEDICARE

## 2020-01-03 ENCOUNTER — TELEPHONE (OUTPATIENT)
Dept: PHARMACY | Facility: CLINIC | Age: 70
End: 2020-01-03

## 2020-01-03 DIAGNOSIS — N18.5 CKD (CHRONIC KIDNEY DISEASE) STAGE 5, GFR LESS THAN 15 ML/MIN (H): ICD-10-CM

## 2020-01-03 DIAGNOSIS — E11.40 TYPE 2 DIABETES MELLITUS WITH DIABETIC NEUROPATHY, WITH LONG-TERM CURRENT USE OF INSULIN (H): Primary | ICD-10-CM

## 2020-01-03 DIAGNOSIS — Z79.4 TYPE 2 DIABETES MELLITUS WITH DIABETIC NEUROPATHY, WITH LONG-TERM CURRENT USE OF INSULIN (H): Primary | ICD-10-CM

## 2020-01-03 DIAGNOSIS — D63.1 ANEMIA OF CHRONIC RENAL FAILURE, STAGE 5 (H): ICD-10-CM

## 2020-01-03 DIAGNOSIS — N18.5 ANEMIA OF CHRONIC RENAL FAILURE, STAGE 5 (H): ICD-10-CM

## 2020-01-03 DIAGNOSIS — L40.8 INVERSE PSORIASIS: Primary | ICD-10-CM

## 2020-01-03 DIAGNOSIS — B35.1 OM (ONYCHOMYCOSIS): ICD-10-CM

## 2020-01-03 LAB
HCT VFR BLD AUTO: 32.7 % (ref 35–47)
HGB BLD-MCNC: 10.5 G/DL (ref 11.7–15.7)

## 2020-01-03 PROCEDURE — 36415 COLL VENOUS BLD VENIPUNCTURE: CPT

## 2020-01-03 PROCEDURE — 85014 HEMATOCRIT: CPT

## 2020-01-03 PROCEDURE — 85018 HEMOGLOBIN: CPT

## 2020-01-03 RX ORDER — DESONIDE 0.5 MG/G
OINTMENT TOPICAL 2 TIMES DAILY
Qty: 60 G | Refills: 3 | Status: SHIPPED | OUTPATIENT
Start: 2020-01-03 | End: 2020-04-01

## 2020-01-03 RX ORDER — CALCIPOTRIENE 50 UG/G
OINTMENT TOPICAL
Qty: 90 G | Refills: 3 | Status: SHIPPED | OUTPATIENT
Start: 2020-01-03 | End: 2020-06-11

## 2020-01-03 ASSESSMENT — PAIN SCALES - GENERAL: PAINLEVEL: NO PAIN (0)

## 2020-01-03 NOTE — TELEPHONE ENCOUNTER
Prior Authorization Approval    Authorization Effective Date: 1/1/2020  Authorization Expiration Date: 1/2/2021  Medication: Otezla 10, 20, 30mg tablets   Approved Dose/Quantity: Titration & maintenance  Reference #: [facility] key# [id number]   Insurance Company: Silver Script Part D - Phone [phone] Fax [phone]  Expected CoPay:       CoPay Card Available: No    Foundation Assistance Needed:    Which Pharmacy is filling the prescription (Not needed for infusion/clinic administered): [facility] - [place], [place] - [address]  Pharmacy Notified:    Patient Notified:

## 2020-01-03 NOTE — LETTER
1/3/2020       RE: Supriya Herr  [address]  [facility] [address]     Dear Colleague,    Thank you for referring your patient, Supriya Herr, to the [facility] at [facility]. Please see a copy of my visit note below.    Chief Complaint   Patient presents with     RECHECK     3 month follow up foot care            Allergies   Allergen Reactions     Penicillins Rash     Unasyn Rash     No evidence SJS, but very uncomfortable and precipitated multiple provider visits. Would not use penicillins again if other options available.          Subjective: Supriya is a 69 year old female who presents to the clinic today for a diabetic foot exam and management. She relates that she has  No new foot complaints.      Objective    Hemoglobin A1C   Date Value Ref Range Status   06/22/2018 6.0 (H) 0 - 5.6 % Final     Comment:     Normal <5.7% Prediabetes 5.7-6.4%  Diabetes 6.5% or higher - adopted from ADA   consensus guidelines.           Non-palpable DP and PT pulses BL.   Equinus noted BL. Pes planus with rigid toe deformities noted to lesser digits on the right. Left AKA noted.   Nails are thickened, discolored, elongated, with subungual debris consistent with onychomycosis.    Scabbed venous ulcer on the anterior right leg. No s/s of infection.  No open lesion associated. No bleeding. No pain to the area. Small scar noted to the plantar right 1st interspace. No s/s of infection. No hyperkeratosis. Irritation erythema to the dorsal right foot.      Assessment: DM2 with left AKA and neuropathy - presenting for a diabetic foot exam.   Onychomycosis.      Plan:   - Pt seen and evaluated  - Nails debrided x 5.  - Cont compression socks.  - See again in 3 months.    Again, thank you for allowing me to participate in the care of your patient.      Sincerely,    Eleazar Pack DPM

## 2020-01-03 NOTE — PATIENT INSTRUCTIONS
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     You will start a medication called Otezla (apremilast). This is not immune suppressive and should not impact your transplant eligibility. It does not effect the kidney. Please check with the kidney doctors.    You will get a dose pack to slowly increase the dose the first month, then take 30 mg twice a day after that (see below).     The main side effect of Otezla is diarrhea. If you do get diarrhea on the full dose we can think about decreasing the dose. Call us. You may have some weight loss. There can be association with depression, but it goes away when you stop the medication.     There are no lab tests that need to be done.     This is a slow medication and can take up to 6 months to work.    To areas of psoriasis in folds:  Mon-Fri calcipotriene twice a day  Sat-Sun desonide twice a day    To areas of psoriasis on arms and legs:  Triamcinolone twice a day      Apremilast Dosing:    Day 1: 10 mg in morning    Day 2: 10 mg in morning    Day 3: 10 mg in morning    Day 4: 20 mg in morning    Day 5: 20 mg in morning    Day 6 and thereafter: 30 mg once daily     Other Instructions:    You may take this medication with or without food    Do not crush, split or chew the tabs    Please, monitor your weight closely    Report changes in mood to the dermatology clinic right away    Who should I call with questions?    [facility]: [phone]     [facility]: [phone]    For urgent needs outside of business hours call the [facility] at [phone] and ask for the dermatology resident on call                    Sun protective clothing and Resources     Navitell ([email or web address])  Athleta ([email or web address])  Bastille Networks ([email or web address])  Carve Designs (carboolino) - affordable  Skinz ([email or web address])    Long sleeve - Haven Cool DRI UPF 50 or  Gulf PFG UPF 50  Quinton Leyva Gulf PFG UPF 50  Swimshirt/Rash Guard - Delores UPF 50 (on Amazon)  Neck - Outdoor Research Ubertubes ([email or web address])

## 2020-01-03 NOTE — TELEPHONE ENCOUNTER
Anemia Management Note  SUBJECTIVE/OBJECTIVE:  Referred by Ethel Guerrero NP on 2020  Primary Diagnosis: Anemia in Chronic Kidney Disease (N18.5, D63.1)     Secondary Diagnosis:  Chronic Kidney Disease, Stage 5 (N18.5)  Hgb goal range:  9-10   Epo/Darbo: Aranesp 40mcg every 14 days for Hgb <10. In Clinic  Iron regimen:  Ferrous Sulfate daily. (19)  Labs : 2020  Recent KELLIE use, transfusion, IV iron: Unknown  RX/TX plans : 10/06/2020  No history of stroke and blood clots.  Hx of HTN, CHF and Basal Cell Carcinoma ()        Contact:            Ok to leave message regarding Medical, Billing and Scheduling information per consent to communicate dated 10/19/2015                              OK to speak with Caroline Sandoval (daughter) and Caroline Baird (sister) regarding Medical, Billing and Scheduling information per consent to communicate dated 10/19/2015    Anemia Latest Ref Rng & Units 2019 10/16/2019 10/30/2019 2019 2019 2019 1/3/2020   KELLIE Dose - - 40mcg 40mcg - - 40mcg -   Hemoglobin 11.7 - 15.7 g/dL 8.7(L) 8.8(L) 9.0(L) 10.7(L) 10.3(L) 9.9(L) 10.5(L)   TSAT 15 - 46 % 22 21 24 28 29 - -   Ferritin 8 - 252 ng/mL 557(H) 524(H) 412(H) 518(H) 600(H) - -     BP Readings from Last 3 Encounters:   19 (!) 145/78   19 (!) 147/70   19 (!) 148/70     Wt Readings from Last 2 Encounters:   19 95.3 kg (210 lb)   19 95.7 kg (211 lb)           ASSESSMENT:  Hgb:Above goal - recommend hold dose  TSat: not at goal of >30% Ferritin: At goal (>100ng/mL)    PLAN:  Hold Aranesp and RTC for hgb then aranesp if needed in 2 week(s)    Orders needed to be renewed (for next follow-up date) in EPIC: None    Iron labs due:  Due        NEXT FOLLOW-UP DATE:  2020    Leslie Rivas RN   Anemia Services  [facility]  [address] [place] [id number]   [email or web address]   Office : [phone]  Fax: [phone]

## 2020-01-03 NOTE — NURSING NOTE
Dermatology Rooming Note    Supriya Herr's goals for this visit include:   Chief Complaint   Patient presents with     Skin Check     Supriya is here today for a skin check.     FABIEN Oneill
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
## 2020-01-03 NOTE — TELEPHONE ENCOUNTER
PA Initiation    Medication: Otezla 10, 20, 30mg tablets (Titration & maintenance)  Insurance Company: Silver Script Part D - Phone [phone] Fax [phone]  Pharmacy Filling the Rx: [facility] - [place], [place] - [address]  Filling Pharmacy Phone: [phone]  Filling Pharmacy Fax:    Start Date: 1/3/2020
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
## 2020-01-08 ENCOUNTER — MYC MEDICAL ADVICE (OUTPATIENT)
Dept: FAMILY MEDICINE | Facility: CLINIC | Age: 70
End: 2020-01-08

## 2020-01-08 NOTE — TELEPHONE ENCOUNTER
Spoke with patient. Told her that letter was sent on 12/27, but for some reason patient did not receive this and has not been given results. Gave patient normal results and mailed copy to her.    Hiral Grmialdo RN   [facility]

## 2020-01-08 NOTE — LETTER
Supriya Herr  [address]  [place] [address]      December 27, 2019  Date of Exam: 12/26/19    Dear Supriya Herr:    Thank you for your recent visit.  Breast Imaging Result: We are pleased to inform you that the results of your recent breast imaging show no evidence of malignancy (cancer).    If you are experiencing any breast problems such as a lump or localized pain we request that you discuss this with your health care provider if you haven t already done so, as additional testing may be necessary.    As you know, early detection of cancer is very important. Although mammography is the most accurate method for early detection, not all cancers are found through mammography. A thorough examination includes a combination of mammography, physical examination and breast self-examination. Currently the American College of Radiology and the Society of Breast Imaging recommend an annual mammogram for all women beginning at the age of 40.    A report of your breast imaging results was sent to: [facility]    Your breast imaging will become part of your medical file here at [facility] for at least 10 years. You are responsible for informing any new health care provider or breast imaging facility of the date and location of this examination.    We appreciate the opportunity to participate in your health care.    Sincerely,    Sawyer Lucas MD  Interpreting Radiologist  [facility]

## 2020-01-09 ENCOUNTER — OFFICE VISIT (OUTPATIENT)
Dept: OPHTHALMOLOGY | Facility: CLINIC | Age: 70
End: 2020-01-09
Attending: OPHTHALMOLOGY
Payer: MEDICARE

## 2020-01-09 DIAGNOSIS — E11.311 DIABETIC MACULAR EDEMA (H): ICD-10-CM

## 2020-01-09 PROCEDURE — 92134 CPTRZ OPH DX IMG PST SGM RTA: CPT | Mod: ZF | Performed by: OPHTHALMOLOGY

## 2020-01-09 PROCEDURE — G0463 HOSPITAL OUTPT CLINIC VISIT: HCPCS | Mod: ZF

## 2020-01-09 ASSESSMENT — REFRACTION_WEARINGRX
OS_SPHERE: -3.50
OS_AXIS: 134
OD_SPHERE: -4.75
OS_CYLINDER: +2.00
OS_ADD: +2.75
OD_AXIS: 093
OD_CYLINDER: +2.25
OD_ADD: +2.75
SPECS_TYPE: BIFOCAL

## 2020-01-09 ASSESSMENT — SLIT LAMP EXAM - LIDS
COMMENTS: NORMAL
COMMENTS: NORMAL

## 2020-01-09 ASSESSMENT — VISUAL ACUITY
OD_CC: 20/60
METHOD: SNELLEN - LINEAR
CORRECTION_TYPE: GLASSES
OD_PH_CC: 20/50
OS_CC: 20/25

## 2020-01-09 ASSESSMENT — TONOMETRY
IOP_METHOD: TONOPEN
OD_IOP_MMHG: 09
OS_IOP_MMHG: 12

## 2020-01-09 ASSESSMENT — CONF VISUAL FIELD
METHOD: COUNTING FINGERS
OS_NORMAL: 1
OD_NORMAL: 1

## 2020-01-09 ASSESSMENT — CUP TO DISC RATIO
OD_RATIO: 0.3
OS_RATIO: 0.3

## 2020-01-09 NOTE — NURSING NOTE
Chief Complaints and History of Present Illnesses   Patient presents with     Diabetic Retinopathy Follow Up     Chief Complaint(s) and History of Present Illness(es)     Diabetic Retinopathy Follow Up     Laterality: both eyes    Onset: gradual    Onset: months ago    Quality: States va is the same since last visit      Frequency: constantly    Associated symptoms: tearing, itching and flashes    Pain scale: 0/10              Comments     Sara Navarro COT 2:58 PM January 9, 2020

## 2020-01-09 NOTE — PROGRESS NOTES
CC: follow up macula edema    HPI: Supriya Herr is a  69 year old year-old patient referred by Dr Baeza for evaluation and treat of DMII with mild nonproliferative diabetic retinopathy and cataract.     Interval history:  Vision seems stable compared to last visit. Reports blood sugar control good and last A1c was 5.5 8/5/2019.   No changes since last visit. Is ready for cataract surgery     PMH:  History of Diabetes mellitus on insulin.   Past ocular history: History of cataract surgery left eye.   history of Macular hole left eye.   Status post Pars plana vitrectomy, membrane peel, air-fluid exchange, SF6 gas infusion, left eye 2/14/2012 by Dr. Daniel     Retinal Imaging:  OCT 1-9-20  RE: few foveal cystic interval slightly increased IRF; retina thinning and partial thickness Macula hole. Stable   LE:  Retina irregularity, trace sup Epiretinal membrane; retina thinning.    optos pic 04/11/19 consistent with exam    fluorescein angiography: 12/04/19 9   Right eye: blockage of fluorescein angiography on the areas of heme; few microaneurysms; mild late Diabetic macular edema and PP leakage  No neovascularization elsewhere; no neovascularization of the disc.  Left eye: few microaneurysms; mild late Diabetic macular edema; staining of the peripheral Chorioretinal  scars    Stable fluorescein angiography     Assessment & Plan:  1. Moderate nonproliferative diabetic retinopathy and mild - Diabetic macular edema both eyes  - mild stable edema in right eye, stable in left eye   Stable- observe    2. 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   - Patient advised that failure to  adequately control each may lead to vision loss. The patient expressed understanding.    Plan to observe today    3. Mod Hypertensive retinopathy   - Stage 5 kidney disease  - Considering HD   - blood pressure control has been poor in 160s/90s-100s  - Likely contributing to macular edema    4. Cataract right eye   - Becoming visually significant   - intraocular lens calcs 7/11/19  - best corrected visual acuity 20/50 right eye   - Plan for Cataract extraction intraocular lens placement in next few months     5. Pseudophakia left eye  best corrected visual acuity 20/30 left eye   Observe    6. History of Macula hole repair left eye by Dr. Daniel  S/p PPV, mp, air-fluid exchange, SF6 gas infusion, left eye 2/14/2012 by Dr. Daniel    Macula hole closed  Observe    7. Dry eye syndrome   Status post punctal plugs   artificial tears  As needed and warm compresses      Plan: to observe,     return to clinic 8 weeks for OCT each eye  Hold on inj today     ~~~~~~~~~~~~~~~~~~~~~~~~~~~~~~~~~~   Complete documentation of historical and exam elements from today's encounter can be found in the full encounter summary report (not reduplicated in this progress note).  I personally obtained the chief complaint(s) and history of present illness.  I confirmed and edited as necessary the review of systems, past medical/surgical history, family history, social history, and examination findings as documented by others; and I examined the patient myself.  I personally reviewed the relevant tests, images, and reports as documented above.  I personally reviewed the ophthalmic test(s) associated with this encounter, agree with the interpretation(s) as documented by the resident/fellow, and have edited the corresponding report(s) as necessary.   I formulated and edited as necessary the assessment and plan and discussed the findings and management plan with the patient and family    Leanne Jett MD   of  Ophthalmology.  Retina Service   Department of Ophthalmology and Visual Neurosciences   [facility]  Phone: [phone]   Fax: [phone]

## 2020-01-09 NOTE — LETTER
1/9/2020       RE: Supriya Herr  [address]  [place] [facility] [address]     Dear Colleague,    Thank you for referring your patient, Supriya Herr, to the EYE CLINIC at [facility]. Please see a copy of my visit note below.       CC: follow up macula edema    HPI: Supriya Herr is a  69 year old year-old patient referred by Dr Baeza for evaluation and treat of DMII with mild nonproliferative diabetic retinopathy and cataract.     Interval history:  Vision seems stable compared to last visit. Reports blood sugar control good and last A1c was 5.5 8/5/2019.   No changes since last visit. Is ready for cataract surgery     PMH:  History of Diabetes mellitus on insulin.   Past ocular history: History of cataract surgery left eye.   history of Macular hole left eye.   Status post Pars plana vitrectomy, membrane peel, air-fluid exchange, SF6 gas infusion, left eye 2/14/2012 by Dr. Daniel     Retinal Imaging:  OCT 1-9-20  RE: few foveal cystic interval slightly increased IRF; retina thinning and partial thickness Macula hole. Stable   LE:  Retina irregularity, trace sup Epiretinal membrane; retina thinning.    optos pic 04/11/19 consistent with exam    fluorescein angiography: 12/04/19 9   Right eye: blockage of fluorescein angiography on the areas of heme; few microaneurysms; mild late Diabetic macular edema and PP leakage  No neovascularization elsewhere; no neovascularization of the disc.  Left eye: few microaneurysms; mild late Diabetic macular edema; staining of the peripheral Chorioretinal  scars    Stable fluorescein angiography     Assessment & Plan:  1. Moderate nonproliferative diabetic retinopathy and mild - Diabetic macular edema both eyes  - mild stable edema in right eye, stable in left eye   Stable- observe    2. 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   - Patient advised that failure to adequately control each may lead to vision loss. The patient expressed understanding.    Plan to observe today    3. Mod Hypertensive retinopathy   - Stage 5 kidney disease  - Considering HD   - blood pressure control has been poor in 160s/90s-100s  - Likely contributing to macular edema    4. Cataract right eye   - Becoming visually significant   - intraocular lens calcs 7/11/19  - best corrected visual acuity 20/50 right eye   - Plan for Cataract extraction intraocular lens placement in next few months     5. Pseudophakia left eye  best corrected visual acuity 20/30 left eye   Observe    6. History of Macula hole repair left eye by Dr. Daniel  S/p PPV, mp, air-fluid exchange, SF6 gas infusion, left eye 2/14/2012 by Dr. Daniel    Macula hole closed  Observe    7. Dry eye syndrome   Status post punctal plugs   artificial tears  As needed and warm compresses      Plan: to observe,     return to clinic 8 weeks for OCT each eye  Hold on inj today   ~~~~~~~~~~~~~~~~~~~~~~~~~~~~~~~~~~   Complete documentation of historical and exam elements from today's encounter can be found in the full encounter summary report (not reduplicated in this progress note).  I personally obtained the chief complaint(s) and history of present illness.  I confirmed and edited as necessary the review of systems, past medical/surgical history, family history, social history, and examination findings as documented by others; and I examined the patient myself.  I personally reviewed the relevant tests, images, and reports as documented above.  I personally reviewed the ophthalmic test(s) associated with this encounter, agree with the interpretation(s) as documented by the  resident/fellow, and have edited the corresponding report(s) as necessary.   I formulated and edited as necessary the assessment and plan and discussed the findings and management plan with the patient and family- Leanne Jett MD    Again, thank you for allowing me to participate in the care of your patient.      Sincerely,    Leanne Jett M.D.   of Ophthalmology  Vitreoretinal Surgeon  [facility]  Department of Ophthalmology & Visual Neurosciences  [facility]  Phone:  [phone]   Fax:  [phone]

## 2020-01-14 ENCOUNTER — TELEPHONE (OUTPATIENT)
Dept: DERMATOLOGY | Facility: CLINIC | Age: 70
End: 2020-01-14

## 2020-01-14 ENCOUNTER — TELEPHONE (OUTPATIENT)
Dept: ENDOCRINOLOGY | Facility: CLINIC | Age: 70
End: 2020-01-14

## 2020-01-14 DIAGNOSIS — D63.1 ANEMIA DUE TO STAGE 5 CHRONIC KIDNEY DISEASE, NOT ON CHRONIC DIALYSIS (H): ICD-10-CM

## 2020-01-14 DIAGNOSIS — Z79.4 TYPE 2 DIABETES MELLITUS WITH HYPERGLYCEMIA, WITH LONG-TERM CURRENT USE OF INSULIN (H): Primary | ICD-10-CM

## 2020-01-14 DIAGNOSIS — N18.5 CKD (CHRONIC KIDNEY DISEASE) STAGE 5, GFR LESS THAN 15 ML/MIN (H): ICD-10-CM

## 2020-01-14 DIAGNOSIS — E11.65 TYPE 2 DIABETES MELLITUS WITH HYPERGLYCEMIA, WITH LONG-TERM CURRENT USE OF INSULIN (H): Primary | ICD-10-CM

## 2020-01-14 DIAGNOSIS — N18.5 ANEMIA DUE TO STAGE 5 CHRONIC KIDNEY DISEASE, NOT ON CHRONIC DIALYSIS (H): ICD-10-CM

## 2020-01-14 RX ORDER — FUROSEMIDE 40 MG
TABLET ORAL
Qty: 180 TABLET | Refills: 3 | Status: SHIPPED | OUTPATIENT
Start: 2020-01-14 | End: 2020-04-01

## 2020-01-14 NOTE — TELEPHONE ENCOUNTER
Prior Authorization Retail Medication Request    Medication/Dose: calcipotriene (DOVONOX) 0.005 %  ICD code (if different than what is on RX):  L40.8  Previously Tried and Failed:    Rationale:      Insurance Name: Medicare  Insurance ID:  [id number]       Pharmacy Information (if different than what is on RX)  [facility]  Phone:  [phone]

## 2020-01-14 NOTE — TELEPHONE ENCOUNTER
Health Call Center    Phone Message    May a detailed message be left on voicemail: yes    Reason for Call: Other: Pt's daughter called to schedule a follow up appointment with Arabella Kamara for the pt, and is wondeirng if the pt should get labs done prior to the appointment. She said it has been a while since her A1C was checked. Please give her a call back to let her know if labs will be needed.     Action Taken: Message routed to:  [facility] ([facility] Endocrinology

## 2020-01-14 NOTE — TELEPHONE ENCOUNTER
Prior Authorization Retail Medication Request    Medication/Dose: desonide (DESOWEN) 0.05 %   ICD code (if different than what is on RX):  L40.8  Previously Tried and Failed:    Rationale:      Insurance Name:  Medicare  Insurance ID:  [id number]      Pharmacy Information (if different than what is on RX)  Name:  [facility]  Phone:  [phone]

## 2020-01-15 ENCOUNTER — TELEPHONE (OUTPATIENT)
Dept: TRANSPLANT | Facility: CLINIC | Age: 70
End: 2020-01-15

## 2020-01-15 NOTE — TELEPHONE ENCOUNTER
Patient's daughter called to set up the next Aranesp injection please contact Caroline to schedule injection for 2 weeks out.

## 2020-01-15 NOTE — TELEPHONE ENCOUNTER
PA Initiation    Medication: calcipotriene (DOVONOX) 0.005 % external ointment - INITIATED  Insurance Company: [facility] [employer] - Phone [phone] Fax [phone]  Pharmacy Filling the Rx: [id number] - [place], [place] - [address] [place] [address]  Filling Pharmacy Phone: [phone]  Filling Pharmacy Fax: [phone]  Start Date: 1/15/2020

## 2020-01-15 NOTE — TELEPHONE ENCOUNTER
PA Initiation    Medication: desonide (DESOWEN) 0.05 % external ointment - INITIATED  Insurance Company: [employer] - Phone [phone] Fax [phone]  Pharmacy Filling the Rx: [id number] - [place], [place] - [address] [place] [address]  Filling Pharmacy Phone: [phone]  Filling Pharmacy Fax: [phone]  Start Date: 1/15/2020

## 2020-01-16 NOTE — TELEPHONE ENCOUNTER
Prior Authorization Approval    Authorization Effective Date: 1/1/2020  Authorization Expiration Date: 1/14/2021  Medication: desonide (DESOWEN) 0.05 % external ointment - APPROVED  Insurance Company: [facility] [employer] - Phone [phone] Fax [phone]  Which Pharmacy is filling the prescription (Not needed for infusion/clinic administered): [id number] - [address]  Pharmacy Notified: Yes  Patient Notified: Yes    Rosa HERNANDEZ Team

## 2020-01-16 NOTE — TELEPHONE ENCOUNTER
Prior Authorization Approval    Authorization Effective Date: 1/1/2020  Authorization Expiration Date: 1/14/2021  Medication: calcipotriene (DOVONOX) 0.005 % external ointment - APPROVED    Insurance Company: [facility] [employer] - Phone [phone] Fax [phone]   Which Pharmacy is filling the prescription (Not needed for infusion/clinic administered): [id number] - [place], [place] - [address] [place] [address]  Pharmacy Notified: Yes  Patient Notified: Yes    Rosa HERNANDEZ Team

## 2020-01-17 ENCOUNTER — ALLIED HEALTH/NURSE VISIT (OUTPATIENT)
Dept: TRANSPLANT | Facility: CLINIC | Age: 70
End: 2020-01-17
Payer: MEDICARE

## 2020-01-17 ENCOUNTER — TELEPHONE (OUTPATIENT)
Dept: PHARMACY | Facility: CLINIC | Age: 70
End: 2020-01-17

## 2020-01-17 DIAGNOSIS — N18.5 CKD (CHRONIC KIDNEY DISEASE) STAGE 5, GFR LESS THAN 15 ML/MIN (H): ICD-10-CM

## 2020-01-17 DIAGNOSIS — E11.65 TYPE 2 DIABETES MELLITUS WITH HYPERGLYCEMIA, WITH LONG-TERM CURRENT USE OF INSULIN (H): ICD-10-CM

## 2020-01-17 DIAGNOSIS — N18.5 ANEMIA OF CHRONIC RENAL FAILURE, STAGE 5 (H): ICD-10-CM

## 2020-01-17 DIAGNOSIS — D63.1 ANEMIA OF CHRONIC RENAL FAILURE, STAGE 5 (H): ICD-10-CM

## 2020-01-17 DIAGNOSIS — N18.5 ANEMIA IN STAGE 5 CHRONIC KIDNEY DISEASE, NOT ON CHRONIC DIALYSIS (H): Primary | ICD-10-CM

## 2020-01-17 DIAGNOSIS — Z79.4 TYPE 2 DIABETES MELLITUS WITH HYPERGLYCEMIA, WITH LONG-TERM CURRENT USE OF INSULIN (H): ICD-10-CM

## 2020-01-17 DIAGNOSIS — E55.9 VITAMIN D DEFICIENCY: ICD-10-CM

## 2020-01-17 DIAGNOSIS — D63.1 ANEMIA IN STAGE 5 CHRONIC KIDNEY DISEASE, NOT ON CHRONIC DIALYSIS (H): Primary | ICD-10-CM

## 2020-01-17 DIAGNOSIS — F41.1 GAD (GENERALIZED ANXIETY DISORDER): ICD-10-CM

## 2020-01-17 LAB
ALBUMIN SERPL-MCNC: 2.7 G/DL (ref 3.4–5)
ALT SERPL W P-5'-P-CCNC: 14 U/L (ref 0–50)
ANION GAP SERPL CALCULATED.3IONS-SCNC: 8 MMOL/L (ref 3–14)
AST SERPL W P-5'-P-CCNC: 6 U/L (ref 0–45)
BUN SERPL-MCNC: 96 MG/DL (ref 7–30)
CALCIUM SERPL-MCNC: 8.4 MG/DL (ref 8.5–10.1)
CHLORIDE SERPL-SCNC: 114 MMOL/L (ref 94–109)
CHOLEST SERPL-MCNC: 145 MG/DL
CO2 SERPL-SCNC: 23 MMOL/L (ref 20–32)
CREAT SERPL-MCNC: 5.35 MG/DL (ref 0.52–1.04)
FERRITIN SERPL-MCNC: 471 NG/ML (ref 8–252)
GFR SERPL CREATININE-BSD FRML MDRD: 7 ML/MIN/{1.73_M2}
GLUCOSE SERPL-MCNC: 127 MG/DL (ref 70–99)
HCT VFR BLD AUTO: 30.3 % (ref 35–47)
HDLC SERPL-MCNC: 55 MG/DL
HGB BLD-MCNC: 9.5 G/DL (ref 11.7–15.7)
IRON SATN MFR SERPL: 29 % (ref 15–46)
IRON SERPL-MCNC: 61 UG/DL (ref 35–180)
LDLC SERPL CALC-MCNC: 74 MG/DL
NONHDLC SERPL-MCNC: 90 MG/DL
PHOSPHATE SERPL-MCNC: 6.9 MG/DL (ref 2.5–4.5)
POTASSIUM SERPL-SCNC: 5.3 MMOL/L (ref 3.4–5.3)
PTH-INTACT SERPL-MCNC: 383 PG/ML (ref 18–80)
SODIUM SERPL-SCNC: 146 MMOL/L (ref 133–144)
TIBC SERPL-MCNC: 212 UG/DL (ref 240–430)
TRIGL SERPL-MCNC: 78 MG/DL
TSH SERPL DL<=0.005 MIU/L-ACNC: 2.93 MU/L (ref 0.4–4)

## 2020-01-17 PROCEDURE — 83970 ASSAY OF PARATHORMONE: CPT

## 2020-01-17 PROCEDURE — 83540 ASSAY OF IRON: CPT

## 2020-01-17 PROCEDURE — 36415 COLL VENOUS BLD VENIPUNCTURE: CPT

## 2020-01-17 PROCEDURE — 83550 IRON BINDING TEST: CPT

## 2020-01-17 PROCEDURE — 85014 HEMATOCRIT: CPT

## 2020-01-17 PROCEDURE — 96372 THER/PROPH/DIAG INJ SC/IM: CPT

## 2020-01-17 PROCEDURE — 82728 ASSAY OF FERRITIN: CPT

## 2020-01-17 PROCEDURE — 80069 RENAL FUNCTION PANEL: CPT

## 2020-01-17 PROCEDURE — 25000128 H RX IP 250 OP 636: Mod: ZF

## 2020-01-17 PROCEDURE — 85018 HEMOGLOBIN: CPT

## 2020-01-17 PROCEDURE — 82306 VITAMIN D 25 HYDROXY: CPT

## 2020-01-17 RX ADMIN — DARBEPOETIN ALFA 40 MCG: 40 INJECTION, SOLUTION INTRAVENOUS; SUBCUTANEOUS at 14:11

## 2020-01-17 NOTE — PROGRESS NOTES
Frequency: every two weeks  Most recent or today's HGB: 9.5  Date: 1/17/20  Date of lat dose: 12/17/19  HGB associated with last dose given: 9.9    Blood Pressure:157/72    Diagnosis: CKD stage 5    Ordered by: Ethel marquez NP    VIS Offered: yes    Double Checked by: carmen Daniels MA

## 2020-01-17 NOTE — TELEPHONE ENCOUNTER
Anemia Management Note  SUBJECTIVE/OBJECTIVE:  Referred by Ethel Guerrero NP on 2020  Primary Diagnosis: Anemia in Chronic Kidney Disease (N18.5, D63.1)     Secondary Diagnosis:  Chronic Kidney Disease, Stage 5 (N18.5)  Hgb goal range:  9-10   Epo/Darbo: Aranesp 40mcg every 14 days for Hgb <10. In Clinic  Iron regimen:  Ferrous Sulfate daily. (19)  Labs : 2020  Recent KELLIE use, transfusion, IV iron: Unknown  RX/TX plans : 10/06/2020  No history of stroke and blood clots.  Hx of HTN, CHF and Basal Cell Carcinoma ()        Contact:            Ok to leave message regarding Medical, Billing and Scheduling information per consent to communicate dated 10/19/2015                              OK to speak with Caroline Sandoval (daughter) and Caroline Baird (sister) regarding Medical, Billing and Scheduling information per consent to communicate dated 10/19/2015    Anemia Latest Ref Rng & Units 10/16/2019 10/30/2019 2019 2019 2019 1/3/2020 2020   KELLIE Dose - 40mcg 40mcg - - 40mcg - 40mcg   Hemoglobin 11.7 - 15.7 g/dL 8.8(L) 9.0(L) 10.7(L) 10.3(L) 9.9(L) 10.5(L) 9.5(L)   TSAT 15 - 46 % 21 24 28 29 - - 29   Ferritin 8 - 252 ng/mL 524(H) 412(H) 518(H) 600(H) - - 471(H)     BP Readings from Last 3 Encounters:   19 (!) 145/78   19 (!) 147/70   19 (!) 148/70     Wt Readings from Last 2 Encounters:   19 95.3 kg (210 lb)   19 95.7 kg (211 lb)           ASSESSMENT:  Hgb:At goal - recommend dose  TSat: not at goal of >30% Ferritin: At goal (>100ng/mL)    PLAN:  Dose with aranesp and RTC for hgb then aranesp if needed in 2 week(s)    Orders needed to be renewed (for next follow-up date) in EPIC: None    Iron labs due:  4 weeks    Plan discussed with:  Caroline (daughter)  Plan provided by:  mingo    NEXT FOLLOW-UP DATE:  2020    Leslie Rivas RN   Anemia Services  [address] [address]   [email or web address]    Office : [phone]  Fax: [phone]

## 2020-01-19 LAB — DEPRECATED CALCIDIOL+CALCIFEROL SERPL-MC: 23 UG/L (ref 20–75)

## 2020-01-27 NOTE — PROGRESS NOTES
Subjective     Supriya Herr is a 70 year old female who presents to clinic today for the following health issues:    HPI   Acute Illness   Acute illness concerns:   Onset: week    Fever: no    Chills/Sweats: no    Headache (location?): YES    Sinus Pressure:YES    Conjunctivitis:  No    Ear Pain: YES: left    Rhinorrhea: YES    Congestion: YES    Sore Throat: YES     Cough: YES-productive of yellow sputum    Wheeze: no     Decreased Appetite: no     Nausea: no    Vomiting: no    Diarrhea:  no    Dysuria/Freq.: no    Fatigue/Achiness: YES    Sick/Strep Exposure: no     Therapies Tried and outcome:          Current Outpatient Medications   Medication Sig Dispense Refill     acetaminophen (TYLENOL) 325 MG tablet Take 2 tablets (650 mg) by mouth every 4 hours as needed for mild pain 100 tablet 0     albuterol (PROAIR HFA/PROVENTIL HFA/VENTOLIN HFA) 108 (90 Base) MCG/ACT inhaler Inhale 2 puffs into the lungs every 6 hours as needed for shortness of breath / dyspnea or wheezing 3 Inhaler 1     amLODIPine (NORVASC) 5 MG tablet Take 1 tablet (5 mg) by mouth 2 times daily 180 tablet 3     apremilast (OTEZLA) 30 MG tablet Take 1 tablet (30 mg) by mouth 2 times daily 60 tablet 2     atorvastatin (LIPITOR) 20 MG tablet TAKE 1 TABLET BY MOUTH ONCE DAILY 90 tablet 3     azithromycin (ZITHROMAX) 250 MG tablet Take 2 tablets (500 mg) by mouth daily for 1 day, THEN 1 tablet (250 mg) daily for 4 days. 6 tablet 0     blood glucose (NO BRAND SPECIFIED) lancets standard Use to test blood sugar 3 to 4  times daily or as directed. 400 each 3     blood glucose (NO BRAND SPECIFIED) test strip Use to test blood sugar 3 to 4 times daily or as directed. 400 strip 3     blood glucose (ONETOUCH ULTRA) test strip TEST YOUR BLOOD SUGAR 3-4 TIMES PER DAY. 400 strip 1     blood glucose monitoring (NO BRAND SPECIFIED) meter device kit Use to test blood sugar 3 to 4 times daily or as directed. 1 kit 0     calcipotriene (DOVONOX) 0.005 % external  "ointment Use M-F in areas of psoriasis 90 g 3     carvedilol (COREG) 25 MG tablet Take 1 tablet (25 mg) by mouth 2 times daily (with meals) 180 tablet 3     clindamycin (CLINDAMAX) 1 % topical gel Apply topically 2 times daily       desonide (DESOWEN) 0.05 % external ointment Apply topically 2 times daily Use Sat Sun to areas of psoriasis 60 g 3     ferrous sulfate (FEROSUL) 325 (65 Fe) MG tablet TAKE 1 TABLET (325 MG) BY MOUTH 2 TIMES DAILY 180 tablet 1     furosemide (LASIX) 40 MG tablet TAKE 1 TABLET (40 MG) BY MOUTH 2 TIMES DAILY (Patient taking differently: 80 mg 2 times daily ) 180 tablet 3     insulin aspart (NOVOLOG FLEXPEN) 100 UNIT/ML pen INJECT 4 UNITS SUBCUTANEOUSLY WITH BREAKFAST, LUNCH AND DINNER. 15 mL 3     insulin glargine (BASAGLAR KWIKPEN) 100 UNIT/ML pen Inject 22 Units Subcutaneous At Bedtime Inject 22 U SubCut at HS 2 mL 3     insulin pen needle (ULTICARE MINI) 31G X 6 MM Use daily or as directed. 100 each 1     order for DME Compression socks - knee  15-20 mmHg  Open toed.  Use daily. 1 Units 0     order for DME Equipment being ordered: mattress overlay for hospital bed  Wt. 192#  Height 5'5\"  99 months/Lifetime 1 Units 0     order for DME 1 SAD light 1 Device 1     order for DME 1 wheelchair 1 Device 0     sertraline (ZOLOFT) 50 MG tablet Take 1 tablet (50 mg) by mouth daily 90 tablet 3     sevelamer carbonate, RENVELA, (RENVELA) 0.8 GM PACK Packet Take 1 packet (0.8 g) by mouth 3 times daily (with meals) 90 packet 3     triamcinolone (KENALOG) 0.1 % external cream Apply to sites of dermatitis- the abdomen, armpits, groin as needed. 30 g 0     vitamin D3 (CHOLECALCIFEROL) 2000 units (50 mcg) tablet Take 1 tablet (2,000 Units) by mouth daily 100 tablet 3         Reviewed and updated as needed this visit by Provider         Review of Systems   ROS COMP: Constitutional, HEENT, cardiovascular, pulmonary, gi and gu systems are negative, except as otherwise noted.      Objective    /68   " Pulse 63   Temp 97.4  F (36.3  C) (Oral)   SpO2 97%   There is no height or weight on file to calculate BMI.  Physical Exam   GENERAL: alert and fatigued  HENT: ear canals and TM's normal tender .l face tender, nose and mouth without ulcers or lesions  NECK: no adenopathy, no asymmetry, masses, or scars and thyroid normal to palpation  RESP: no rales  and rhonchi R upper anterior and L upper anterior  CV: regular rates and rhythm    Diagnostic Test Results:  Labs reviewed in Epic        Assessment & Plan     1. Acute sinusitis with symptoms greater than 10 days  use  - azithromycin (ZITHROMAX) 250 MG tablet; Take 2 tablets (500 mg) by mouth daily for 1 day, THEN 1 tablet (250 mg) daily for 4 days.  Dispense: 6 tablet; Refill: 0    2. Cough    - albuterol (PROAIR HFA/PROVENTIL HFA/VENTOLIN HFA) 108 (90 Base) MCG/ACT inhaler; Inhale 2 puffs into the lungs every 6 hours as needed for shortness of breath / dyspnea or wheezing  Dispense: 3 Inhaler; Refill: 1  - azithromycin (ZITHROMAX) 250 MG tablet; Take 2 tablets (500 mg) by mouth daily for 1 day, THEN 1 tablet (250 mg) daily for 4 days.  Dispense: 6 tablet; Refill: 0    3. NICOLE (generalized anxiety disorder)  Well controlled   - sertraline (ZOLOFT) 50 MG tablet; Take 1 tablet (50 mg) by mouth daily  Dispense: 90 tablet; Refill: 3        See Patient Instructions    No follow-ups on file.    Noam Jackson MD  [facility]

## 2020-01-28 ENCOUNTER — OFFICE VISIT (OUTPATIENT)
Dept: FAMILY MEDICINE | Facility: CLINIC | Age: 70
End: 2020-01-28
Payer: MEDICARE

## 2020-01-28 VITALS
OXYGEN SATURATION: 97 % | DIASTOLIC BLOOD PRESSURE: 68 MMHG | HEART RATE: 63 BPM | TEMPERATURE: 97.4 F | SYSTOLIC BLOOD PRESSURE: 132 MMHG

## 2020-01-28 DIAGNOSIS — F41.1 GAD (GENERALIZED ANXIETY DISORDER): ICD-10-CM

## 2020-01-28 DIAGNOSIS — J01.90 ACUTE SINUSITIS WITH SYMPTOMS GREATER THAN 10 DAYS: Primary | ICD-10-CM

## 2020-01-28 DIAGNOSIS — R05.9 COUGH: ICD-10-CM

## 2020-01-28 PROCEDURE — 99213 OFFICE O/P EST LOW 20 MIN: CPT | Performed by: FAMILY MEDICINE

## 2020-01-28 RX ORDER — AZITHROMYCIN 250 MG/1
TABLET, FILM COATED ORAL
Qty: 6 TABLET | Refills: 0 | Status: SHIPPED | OUTPATIENT
Start: 2020-01-28 | End: 2020-03-03

## 2020-01-28 RX ORDER — ALBUTEROL SULFATE 90 UG/1
2 AEROSOL, METERED RESPIRATORY (INHALATION) EVERY 6 HOURS PRN
Qty: 3 INHALER | Refills: 1 | Status: SHIPPED | OUTPATIENT
Start: 2020-01-28

## 2020-01-31 ENCOUNTER — ALLIED HEALTH/NURSE VISIT (OUTPATIENT)
Dept: TRANSPLANT | Facility: CLINIC | Age: 70
End: 2020-01-31
Payer: MEDICARE

## 2020-01-31 DIAGNOSIS — D63.1 ANEMIA IN STAGE 5 CHRONIC KIDNEY DISEASE, NOT ON CHRONIC DIALYSIS (H): Primary | ICD-10-CM

## 2020-01-31 DIAGNOSIS — D63.1 ANEMIA OF CHRONIC RENAL FAILURE, STAGE 5 (H): ICD-10-CM

## 2020-01-31 DIAGNOSIS — N18.5 ANEMIA OF CHRONIC RENAL FAILURE, STAGE 5 (H): ICD-10-CM

## 2020-01-31 DIAGNOSIS — N18.5 CKD (CHRONIC KIDNEY DISEASE) STAGE 5, GFR LESS THAN 15 ML/MIN (H): ICD-10-CM

## 2020-01-31 DIAGNOSIS — N18.5 ANEMIA IN STAGE 5 CHRONIC KIDNEY DISEASE, NOT ON CHRONIC DIALYSIS (H): Primary | ICD-10-CM

## 2020-01-31 LAB
FERRITIN SERPL-MCNC: 549 NG/ML (ref 8–252)
HCT VFR BLD AUTO: 31.9 % (ref 35–47)
HGB BLD-MCNC: 9.9 G/DL (ref 11.7–15.7)
IRON SATN MFR SERPL: 25 % (ref 15–46)
IRON SERPL-MCNC: 52 UG/DL (ref 35–180)
TIBC SERPL-MCNC: 210 UG/DL (ref 240–430)

## 2020-01-31 PROCEDURE — 85018 HEMOGLOBIN: CPT

## 2020-01-31 PROCEDURE — 83540 ASSAY OF IRON: CPT

## 2020-01-31 PROCEDURE — 96372 THER/PROPH/DIAG INJ SC/IM: CPT

## 2020-01-31 PROCEDURE — 85014 HEMATOCRIT: CPT

## 2020-01-31 PROCEDURE — 25000128 H RX IP 250 OP 636: Mod: ZF

## 2020-01-31 PROCEDURE — 82728 ASSAY OF FERRITIN: CPT

## 2020-01-31 PROCEDURE — 83550 IRON BINDING TEST: CPT

## 2020-01-31 PROCEDURE — 36415 COLL VENOUS BLD VENIPUNCTURE: CPT

## 2020-01-31 RX ADMIN — DARBEPOETIN ALFA 40 MCG: 40 INJECTION, SOLUTION INTRAVENOUS; SUBCUTANEOUS at 14:27

## 2020-01-31 NOTE — PROGRESS NOTES
Freequency: Every two weeks  Most recent or today's HGB: 9.9   Date: 20  Date of lat dose: 40mcg  HGB associated with last dose given: 9.9    Blood Pressure:145/67    Diagnosis: CKD Stage 5    Ordered by: Ethel Guerrero MD  VIS Offered:  yes    Double Checked by: carmen Daniels MA    See MAR for administration details    Pt's first name, last name and  verified prior to medication administration, injection given without complications or questions.

## 2020-02-03 ENCOUNTER — TELEPHONE (OUTPATIENT)
Dept: PHARMACY | Facility: CLINIC | Age: 70
End: 2020-02-03

## 2020-02-03 NOTE — TELEPHONE ENCOUNTER
Anemia Management Note  SUBJECTIVE/OBJECTIVE:  Referred by Ethel Guerrero NP on 2020  Primary Diagnosis: Anemia in Chronic Kidney Disease (N18.5, D63.1)     Secondary Diagnosis:  Chronic Kidney Disease, Stage 5 (N18.5)  Hgb goal range:  9-10   Epo/Darbo: Aranesp 40mcg every 14 days for Hgb <10. In Clinic  Iron regimen:  Ferrous Sulfate daily. (19)  Labs : 2020  Recent KELLIE use, transfusion, IV iron: Unknown  RX/TX plans : 10/06/2020  No history of stroke and blood clots.  Hx of HTN, CHF and Basal Cell Carcinoma ()        Contact:            Ok to leave message regarding Medical, Billing and Scheduling information per consent to communicate dated 10/19/2015                              OK to speak with Caroline Sandoval (daughter) and Caroline Baird (sister) regarding Medical, Billing and Scheduling information per consent to communicate dated 10/19/2015    Anemia Latest Ref Rng & Units 10/30/2019 2019 2019 2019 1/3/2020 2020 2020   KELLIE Dose - 40mcg - - 40mcg - 40mcg 40mcg   Hemoglobin 11.7 - 15.7 g/dL 9.0(L) 10.7(L) 10.3(L) 9.9(L) 10.5(L) 9.5(L) 9.9(L)   TSAT 15 - 46 % 24 28 29 - - 29 25   Ferritin 8 - 252 ng/mL 412(H) 518(H) 600(H) - - 471(H) 549(H)     BP Readings from Last 3 Encounters:   20 132/68   19 (!) 145/78   19 (!) 147/70     Wt Readings from Last 2 Encounters:   19 95.3 kg (210 lb)   19 95.7 kg (211 lb)           ASSESSMENT:  Hgb:At goal - recommend dose  TSat: not at goal (>30%) but ferritin >500ng/mL.  IV iron not indicated at this time per anemia protocol. Ferritin: At goal (>100ng/mL)    PLAN:  Dose with aranesp and RTC for hgb then aranesp if needed in 2 week(s)    Orders needed to be renewed (for next follow-up date) in EPIC: None    Iron labs due:  4 weeks    Plan discussed with:  No call made,  Daughter sched appt for 2020  Plan provided by:  mingo    NEXT FOLLOW-UP DATE:  2020    Leslie Rivas RN   Anemia  Services  [address] [address]   [email or web address]   Office : [phone]  Fax: [phone]

## 2020-02-10 ENCOUNTER — HEALTH MAINTENANCE LETTER (OUTPATIENT)
Age: 70
End: 2020-02-10

## 2020-02-11 DIAGNOSIS — N18.5 CKD (CHRONIC KIDNEY DISEASE) STAGE 5, GFR LESS THAN 15 ML/MIN (H): Primary | ICD-10-CM

## 2020-02-14 ENCOUNTER — ALLIED HEALTH/NURSE VISIT (OUTPATIENT)
Dept: TRANSPLANT | Facility: CLINIC | Age: 70
End: 2020-02-14
Payer: MEDICARE

## 2020-02-14 ENCOUNTER — TELEPHONE (OUTPATIENT)
Dept: PHARMACY | Facility: CLINIC | Age: 70
End: 2020-02-14

## 2020-02-14 ENCOUNTER — OFFICE VISIT (OUTPATIENT)
Dept: ENDOCRINOLOGY | Facility: CLINIC | Age: 70
End: 2020-02-14
Payer: MEDICARE

## 2020-02-14 VITALS — HEART RATE: 66 BPM | DIASTOLIC BLOOD PRESSURE: 72 MMHG | SYSTOLIC BLOOD PRESSURE: 159 MMHG

## 2020-02-14 VITALS — DIASTOLIC BLOOD PRESSURE: 72 MMHG | HEART RATE: 64 BPM | SYSTOLIC BLOOD PRESSURE: 157 MMHG

## 2020-02-14 DIAGNOSIS — E11.40 TYPE 2 DIABETES MELLITUS WITH DIABETIC NEUROPATHY, WITH LONG-TERM CURRENT USE OF INSULIN (H): Primary | ICD-10-CM

## 2020-02-14 DIAGNOSIS — Z79.4 TYPE 2 DIABETES MELLITUS WITH DIABETIC NEUROPATHY, WITH LONG-TERM CURRENT USE OF INSULIN (H): Primary | ICD-10-CM

## 2020-02-14 DIAGNOSIS — N18.5 ANEMIA OF CHRONIC RENAL FAILURE, STAGE 5 (H): ICD-10-CM

## 2020-02-14 DIAGNOSIS — N18.5 CKD (CHRONIC KIDNEY DISEASE) STAGE 5, GFR LESS THAN 15 ML/MIN (H): ICD-10-CM

## 2020-02-14 DIAGNOSIS — N18.5 ANEMIA IN STAGE 5 CHRONIC KIDNEY DISEASE, NOT ON CHRONIC DIALYSIS (H): Primary | ICD-10-CM

## 2020-02-14 DIAGNOSIS — D63.1 ANEMIA OF CHRONIC RENAL FAILURE, STAGE 5 (H): ICD-10-CM

## 2020-02-14 DIAGNOSIS — D63.1 ANEMIA IN STAGE 5 CHRONIC KIDNEY DISEASE, NOT ON CHRONIC DIALYSIS (H): Primary | ICD-10-CM

## 2020-02-14 LAB
ALBUMIN SERPL-MCNC: 2.8 G/DL (ref 3.4–5)
ANION GAP SERPL CALCULATED.3IONS-SCNC: 10 MMOL/L (ref 3–14)
BUN SERPL-MCNC: 106 MG/DL (ref 7–30)
CALCIUM SERPL-MCNC: 8.7 MG/DL (ref 8.5–10.1)
CHLORIDE SERPL-SCNC: 113 MMOL/L (ref 94–109)
CO2 SERPL-SCNC: 22 MMOL/L (ref 20–32)
CREAT SERPL-MCNC: 5.76 MG/DL (ref 0.52–1.04)
FERRITIN SERPL-MCNC: 494 NG/ML (ref 8–252)
GFR SERPL CREATININE-BSD FRML MDRD: 7 ML/MIN/{1.73_M2}
GLUCOSE SERPL-MCNC: 143 MG/DL (ref 70–99)
HBA1C MFR BLD: 5.2 % (ref 4.3–6)
HCT VFR BLD AUTO: 32.1 % (ref 35–47)
HGB BLD-MCNC: 9.8 G/DL (ref 11.7–15.7)
IRON SATN MFR SERPL: 30 % (ref 15–46)
IRON SERPL-MCNC: 63 UG/DL (ref 35–180)
PHOSPHATE SERPL-MCNC: 8.2 MG/DL (ref 2.5–4.5)
POTASSIUM SERPL-SCNC: 5.2 MMOL/L (ref 3.4–5.3)
SODIUM SERPL-SCNC: 144 MMOL/L (ref 133–144)
TIBC SERPL-MCNC: 207 UG/DL (ref 240–430)

## 2020-02-14 PROCEDURE — 80069 RENAL FUNCTION PANEL: CPT

## 2020-02-14 PROCEDURE — 85018 HEMOGLOBIN: CPT

## 2020-02-14 PROCEDURE — 25000128 H RX IP 250 OP 636: Mod: ZF,EC

## 2020-02-14 PROCEDURE — 36415 COLL VENOUS BLD VENIPUNCTURE: CPT

## 2020-02-14 PROCEDURE — 83550 IRON BINDING TEST: CPT

## 2020-02-14 PROCEDURE — 85014 HEMATOCRIT: CPT

## 2020-02-14 PROCEDURE — 96372 THER/PROPH/DIAG INJ SC/IM: CPT | Mod: ZF

## 2020-02-14 PROCEDURE — 82728 ASSAY OF FERRITIN: CPT

## 2020-02-14 PROCEDURE — 83540 ASSAY OF IRON: CPT

## 2020-02-14 RX ORDER — FLASH GLUCOSE SENSOR
1 KIT MISCELLANEOUS
Qty: 8 EACH | Refills: 3 | Status: SHIPPED | OUTPATIENT
Start: 2020-02-14 | End: 2020-02-19

## 2020-02-14 RX ORDER — FLASH GLUCOSE SENSOR
1 KIT MISCELLANEOUS DAILY
Qty: 1 DEVICE | Refills: 0 | Status: SHIPPED | OUTPATIENT
Start: 2020-02-14 | End: 2020-02-19

## 2020-02-14 RX ADMIN — DARBEPOETIN ALFA 40 MCG: 40 INJECTION, SOLUTION INTRAVENOUS; SUBCUTANEOUS at 12:27

## 2020-02-14 ASSESSMENT — PAIN SCALES - GENERAL: PAINLEVEL: NO PAIN (0)
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
## 2020-02-14 NOTE — PATIENT INSTRUCTIONS
You are doing a great job.  Keep up the good work!!  Continue Lantus 22 units at bedtime.  Continue Novolog 4 units with meals.  I placed an order for the Freestyle Avery device/sensor. Check your blood sugar fasting each am, before lunch, before dinner and at bedtime DAILY.  See me in 4 months.  Arabella Kamara PA-C

## 2020-02-14 NOTE — TELEPHONE ENCOUNTER
Anemia Management Note  SUBJECTIVE/OBJECTIVE:  Referred by Ethel Guerrero NP on 2020  Primary Diagnosis: Anemia in Chronic Kidney Disease (N18.5, D63.1)     Secondary Diagnosis:  Chronic Kidney Disease, Stage 5 (N18.5)  Hgb goal range:  9-10   Epo/Darbo: Aranesp 40mcg every 14 days for Hgb <10. In Clinic  Iron regimen:  Ferrous Sulfate daily. (19)  Labs : 2020  Recent KELLIE use, transfusion, IV iron: Unknown  RX/TX plans : 10/06/2020  No history of stroke and blood clots.  Hx of HTN, CHF and Basal Cell Carcinoma ()        Contact:            Ok to leave message regarding Medical, Billing and Scheduling information per consent to communicate dated 10/19/2015                              OK to speak with Caroline Sandoval (daughter) and Caroline Baird (sister) regarding Medical, Billing and Scheduling information per consent to communicate dated 10/19/2015    Anemia Latest Ref Rng & Units 2019 2019 2019 1/3/2020 2020 2020 2020   KELLIE Dose - - - 40mcg - 40mcg 40mcg 40mcg   Hemoglobin 11.7 - 15.7 g/dL 10.7(L) 10.3(L) 9.9(L) 10.5(L) 9.5(L) 9.9(L) 9.8(L)   TSAT 15 - 46 % 28 29 - - 29 25 30   Ferritin 8 - 252 ng/mL 518(H) 600(H) - - 471(H) 549(H) 494(H)     BP Readings from Last 3 Encounters:   20 (!) 157/72   20 (!) 159/72   20 132/68     Wt Readings from Last 2 Encounters:   19 95.3 kg (210 lb)   19 95.7 kg (211 lb)           ASSESSMENT:  Hgb:At goal - recommend dose  TSat: at goal >30% Ferritin: At goal (>100ng/mL)    PLAN:  Dose with aranesp and RTC for hgb then aranesp if needed in 2 week(s)    Orders needed to be renewed (for next follow-up date) in EPIC: None    Iron labs due:  4 weeks    Plan discussed with:  No call made, daughter schedules appts.   Plan provided by:  mingo    NEXT FOLLOW-UP DATE:  2020    Leslie Rivas RN   Anemia Services  [address] [address]    [email or web address]   Office : [phone]  Fax: [phone]

## 2020-02-18 ENCOUNTER — TELEPHONE (OUTPATIENT)
Dept: TRANSPLANT | Facility: CLINIC | Age: 70
End: 2020-02-18

## 2020-02-18 DIAGNOSIS — N18.5 CKD (CHRONIC KIDNEY DISEASE) STAGE 5, GFR LESS THAN 15 ML/MIN (H): Primary | ICD-10-CM

## 2020-02-18 NOTE — TELEPHONE ENCOUNTER
Called pt to follow up on thoughts on going forward with transplant. Pt was at her day program and this writer suggested a call back at a later time. Pt agreed.     Alicia Canada, [facility]    Kidney/Pancreas/Auto Islet Transplant Programs

## 2020-02-19 DIAGNOSIS — Z79.4 TYPE 2 DIABETES MELLITUS WITH DIABETIC NEUROPATHY, WITH LONG-TERM CURRENT USE OF INSULIN (H): ICD-10-CM

## 2020-02-19 DIAGNOSIS — E11.40 TYPE 2 DIABETES MELLITUS WITH DIABETIC NEUROPATHY, WITH LONG-TERM CURRENT USE OF INSULIN (H): ICD-10-CM

## 2020-02-19 RX ORDER — FLASH GLUCOSE SENSOR
1 KIT MISCELLANEOUS SEE ADMIN INSTRUCTIONS
Qty: 1 DEVICE | Refills: 0 | Status: SHIPPED | OUTPATIENT
Start: 2020-02-19 | End: 2020-10-28

## 2020-02-19 RX ORDER — FLASH GLUCOSE SENSOR
1 KIT MISCELLANEOUS
Qty: 6 EACH | Refills: 3 | Status: SHIPPED | OUTPATIENT
Start: 2020-02-19 | End: 2020-10-28

## 2020-02-28 ENCOUNTER — ALLIED HEALTH/NURSE VISIT (OUTPATIENT)
Dept: TRANSPLANT | Facility: CLINIC | Age: 70
End: 2020-02-28
Payer: MEDICARE

## 2020-02-28 DIAGNOSIS — N18.5 ANEMIA IN STAGE 5 CHRONIC KIDNEY DISEASE, NOT ON CHRONIC DIALYSIS (H): Primary | ICD-10-CM

## 2020-02-28 DIAGNOSIS — D63.1 ANEMIA IN STAGE 5 CHRONIC KIDNEY DISEASE, NOT ON CHRONIC DIALYSIS (H): Primary | ICD-10-CM

## 2020-02-28 DIAGNOSIS — N18.5 CKD (CHRONIC KIDNEY DISEASE) STAGE 5, GFR LESS THAN 15 ML/MIN (H): ICD-10-CM

## 2020-02-28 LAB
ALBUMIN SERPL-MCNC: 2.8 G/DL (ref 3.4–5)
ANION GAP SERPL CALCULATED.3IONS-SCNC: 12 MMOL/L (ref 3–14)
BUN SERPL-MCNC: 107 MG/DL (ref 7–30)
CALCIUM SERPL-MCNC: 8.9 MG/DL (ref 8.5–10.1)
CHLORIDE SERPL-SCNC: 111 MMOL/L (ref 94–109)
CO2 SERPL-SCNC: 19 MMOL/L (ref 20–32)
CREAT SERPL-MCNC: 6.12 MG/DL (ref 0.52–1.04)
ERYTHROCYTE [DISTWIDTH] IN BLOOD BY AUTOMATED COUNT: 13.9 % (ref 10–15)
GFR SERPL CREATININE-BSD FRML MDRD: 6 ML/MIN/{1.73_M2}
GLUCOSE SERPL-MCNC: 97 MG/DL (ref 70–99)
HCT VFR BLD AUTO: 33 % (ref 35–47)
HGB BLD-MCNC: 10.2 G/DL (ref 11.7–15.7)
MCH RBC QN AUTO: 29.3 PG (ref 26.5–33)
MCHC RBC AUTO-ENTMCNC: 30.9 G/DL (ref 31.5–36.5)
MCV RBC AUTO: 95 FL (ref 78–100)
PHOSPHATE SERPL-MCNC: 8.8 MG/DL (ref 2.5–4.5)
PLATELET # BLD AUTO: 197 10E9/L (ref 150–450)
POTASSIUM SERPL-SCNC: 5.1 MMOL/L (ref 3.4–5.3)
RBC # BLD AUTO: 3.48 10E12/L (ref 3.8–5.2)
SODIUM SERPL-SCNC: 141 MMOL/L (ref 133–144)
WBC # BLD AUTO: 6.1 10E9/L (ref 4–11)

## 2020-02-28 PROCEDURE — 36415 COLL VENOUS BLD VENIPUNCTURE: CPT

## 2020-02-28 PROCEDURE — 85027 COMPLETE CBC AUTOMATED: CPT

## 2020-02-28 PROCEDURE — 80069 RENAL FUNCTION PANEL: CPT

## 2020-03-02 ENCOUNTER — TELEPHONE (OUTPATIENT)
Dept: PHARMACY | Facility: CLINIC | Age: 70
End: 2020-03-02

## 2020-03-02 ENCOUNTER — TELEPHONE (OUTPATIENT)
Dept: NEPHROLOGY | Facility: CLINIC | Age: 70
End: 2020-03-02

## 2020-03-02 NOTE — TELEPHONE ENCOUNTER
Patient contacted and reminded of upcoming appointment.  Patient confirmed they will be attending.  Patient instructed to bring updated medications list to appointment.Darlene Levy/SHERI  March 2, 2020 5:04 PM

## 2020-03-02 NOTE — TELEPHONE ENCOUNTER
Anemia Management Note  SUBJECTIVE/OBJECTIVE:  Referred by Ethel Guerrero NP on 2020  Primary Diagnosis: Anemia in Chronic Kidney Disease (N18.5, D63.1)     Secondary Diagnosis:  Chronic Kidney Disease, Stage 5 (N18.5)  Hgb goal range:  9-10   Epo/Darbo: Aranesp 40mcg every 14 days for Hgb <10. In Clinic  Iron regimen:  Ferrous Sulfate daily. (19)  Labs : 2020  Recent KELLIE use, transfusion, IV iron: Unknown  RX/TX plans : 10/06/2020  No history of stroke and blood clots.  Hx of HTN, CHF and Basal Cell Carcinoma ()        Contact:            Ok to leave message regarding Medical, Billing and Scheduling information per consent to communicate dated 10/19/2015                              OK to speak with Caroline Sandoval (daughter) and Caroline Baird (sister) regarding Medical, Billing and Scheduling information per consent to communicate dated 10/19/2015    Anemia Latest Ref Rng & Units 2019 2019 1/3/2020 2020 2020 2020 2020   KELLIE Dose - - 40mcg - 40mcg 40mcg 40mcg -   Hemoglobin 11.7 - 15.7 g/dL 10.3(L) 9.9(L) 10.5(L) 9.5(L) 9.9(L) 9.8(L) 10.2(L)   TSAT 15 - 46 % 29 - - 29 25 30 -   Ferritin 8 - 252 ng/mL 600(H) - - 471(H) 549(H) 494(H) -     BP Readings from Last 3 Encounters:   20 (!) 157/72   20 (!) 159/72   20 132/68     Wt Readings from Last 2 Encounters:   19 95.3 kg (210 lb)   19 95.7 kg (211 lb)           ASSESSMENT:  Hgb:Above goal - recommend hold dose  TSat: at goal >30% Ferritin: At goal (>100ng/mL)    PLAN:  Hold Aranesp and RTC for hgb then aranesp if needed in 2 week(s)    Orders needed to be renewed (for next follow-up date) in EPIC: None    Iron labs due:  3/13/2020    Plan discussed with:  No call made, appt scheduled for 3/13/20  Plan provided by:  mingo    NEXT FOLLOW-UP DATE:  3/16/2020    Leslie Rivas RN   Anemia Services  [address] [place] [address]    [email or web address]   Office : [phone]  Fax: [phone]

## 2020-03-03 ENCOUNTER — DOCUMENTATION ONLY (OUTPATIENT)
Dept: TRANSPLANT | Facility: CLINIC | Age: 70
End: 2020-03-03

## 2020-03-03 ENCOUNTER — OFFICE VISIT (OUTPATIENT)
Dept: NEPHROLOGY | Facility: CLINIC | Age: 70
End: 2020-03-03
Payer: MEDICARE

## 2020-03-03 ENCOUNTER — TELEPHONE (OUTPATIENT)
Dept: TRANSPLANT | Facility: CLINIC | Age: 70
End: 2020-03-03

## 2020-03-03 VITALS
HEART RATE: 68 BPM | DIASTOLIC BLOOD PRESSURE: 74 MMHG | TEMPERATURE: 98 F | OXYGEN SATURATION: 98 % | SYSTOLIC BLOOD PRESSURE: 150 MMHG

## 2020-03-03 DIAGNOSIS — D63.1 ANEMIA DUE TO STAGE 5 CHRONIC KIDNEY DISEASE, NOT ON CHRONIC DIALYSIS (H): ICD-10-CM

## 2020-03-03 DIAGNOSIS — I10 ESSENTIAL HYPERTENSION: ICD-10-CM

## 2020-03-03 DIAGNOSIS — N18.5 ANEMIA DUE TO STAGE 5 CHRONIC KIDNEY DISEASE, NOT ON CHRONIC DIALYSIS (H): ICD-10-CM

## 2020-03-03 DIAGNOSIS — E55.9 VITAMIN D DEFICIENCY: ICD-10-CM

## 2020-03-03 DIAGNOSIS — E83.39 HYPERPHOSPHATEMIA: ICD-10-CM

## 2020-03-03 DIAGNOSIS — N18.5 CKD (CHRONIC KIDNEY DISEASE) STAGE 5, GFR LESS THAN 15 ML/MIN (H): Primary | ICD-10-CM

## 2020-03-03 PROCEDURE — G0463 HOSPITAL OUTPT CLINIC VISIT: HCPCS | Mod: ZF

## 2020-03-03 ASSESSMENT — PAIN SCALES - GENERAL: PAINLEVEL: WORST PAIN (10)

## 2020-03-03 NOTE — LETTER
3/3/2020      RE: Supriya Herr  [address]  [facility] [id number]       Nephrology Clinic Visit 3/3/20    Assessment and Plan:     1. CKD5 w/proteinuria- Creat  6.1, eGFR 6 ml/mn, UPCR 6.5 g/gCr (12/19). Mild uremic symptoms   - Etiology of CKD felt to be DM, HTN, Vascular dz,    - Remains Inactive on transplant wait list but working with transplant team to become active on the list.     - Has been seen by Dr Martin and will need AVG for HD.    - We discussed the wisdom of moving forward with AVG placement and then initiation of dialysis once cleared by the Surgeon. Although both Supriya and Caroline were emotional about this decision, they are prepared and agreeable. We discussed that if intra operatively the Surgeon feels that an AVF would be possible then a Tunneled line will be placed while AVF maturing. I reviewed that I anticipate that HD will be started within the next 4-6 wks. Will have [facility] begin HD search once patient has had her AVG placement    - Does not use NSAIDs   - Blood pressures are borderline, more elevated today with anxiety around the visit    - DM with excellent control. A1c 5.2 % 2/20   - On statin for CV risk reduction     2. Volume status - Improved volume status. Edema has resolved with generally improved blood pressures. No weight today. Was 210# last visit and 212 # on 9/6/19.     - Continue Lasix 80 mg bid    - Continue Na restricted diet      3. HTN - Uncontrolled but improved. Clinic blood pressures 150 x 3/ but she is anxiouss. Day program blood pressures in the 140-150/ range, but no way to evaluate device.   Current regimen:    - lasix 80 bid   - Coreg 25 mg bid   - Amlodipine 5 mg bid    - Continue home monitoring   - Blood pressure goal < 130/80     4. Electrolytes - No acute concerns. K trending up. Has been running in the low 5's since 1/20. Currently 5.1, Na 141     5. Acid base - No acute concerns. Bicarb 21   - Has developed metabolic acidosis 2/2 worsening renal  function   - Will correct on HD     6. BMD - Ca corrected 9.8, Phos 8.8 9 (rising), Albumin 2.8 (declining)   - Vit D 23,  ()   - Continue Vit D 2000 U every day, but will hold Calcitriol given borderline hypercalcemia   - Continue Renvela 800 mg TID with meals. Will improve once we start HD     7. Anemia - Hgb 10.2    - Iron studies : Ferritin 600, Fe 66, IS 29.    - Enrolled in anemia management. Given Darbo 40 mcg q 14 dys   - On iron bid   - Colonoscopy , tubular adenomas     8. DM2 - Well controlled. A1c 5.2 %. On Insulin      9. Depression - Controlled on Zoloft 50 mg every day.       10. Disposition - RTC  4 wks for f/u and final HD preparations     Assessment and plan was discussed with patient and she voiced her understanding and agreement.     Reason for Visit:  CKD5/HTN f/u     HPI:  Ms Herr is a [age] yo female with CKD5 and nephrotic range proteinuria, DM2, HTN, in today for routine CKD f/u. Last seen by me on 19. Decision was made to have access placed and begin search for HD unit when GFR is 5.    Baseline creat now in the 6 range       Interval Hx:   No hospital admissions     ROS:   Edema has resolved since increasing her Furosemide.   Blood pressures improved. Home readings 140-150/.   No further dyspnea. .   Keeping her leg elevated more consistently  Has good appetite. Denies chest pain, abdominal pain. No bowel or bladder concerns. Decided to not get a new prosthesis.      Chronic Health Problems:     CKD5  Proteinuria  AKA, left (10/18)  T2DM  Vit D def  HTN  HLD  Anxiety/depression  Vitiligo  Non proliferative diabetic retinopathy  BCC  JONE  Anemia of CKD  Prior tobacco abuse     Personal Hx:   L[facility] in [facility], daughter Caroline upper level. NS. Attends day program at [facility] Monday through Thursday    Family Hx:   Family History   Problem Relation Age of Onset     Diabetes Mother      Hypertension Mother      Heart Disease Mother          of  congestive heart failure     Eye Disorder Mother      Arthritis Mother      Obesity Mother      Heart Disease Father          from CHF     Cerebrovascular Disease Father      Arthritis Father      Musculoskeletal Disorder Other         has MS     Thyroid Disease Other      Eye Disorder Other         cataracts     Cancer Other         throat/liver     Skin Cancer No family hx of      Melanoma No family hx of      Glaucoma No family hx of      Macular Degeneration No family hx of          Allergies:  Allergies   Allergen Reactions     Penicillins Rash     Unasyn Rash     No evidence SJS, but very uncomfortable and precipitated multiple provider visits. Would not use penicillins again if other options available.        Medications:  Current Outpatient Medications   Medication Sig     acetaminophen (TYLENOL) 325 MG tablet Take 2 tablets (650 mg) by mouth every 4 hours as needed for mild pain     albuterol (PROAIR HFA/PROVENTIL HFA/VENTOLIN HFA) 108 (90 Base) MCG/ACT inhaler Inhale 2 puffs into the lungs every 6 hours as needed for shortness of breath / dyspnea or wheezing     amLODIPine (NORVASC) 5 MG tablet Take 1 tablet (5 mg) by mouth 2 times daily     apremilast (OTEZLA) 30 MG tablet Take 1 tablet (30 mg) by mouth 2 times daily     atorvastatin (LIPITOR) 20 MG tablet TAKE 1 TABLET BY MOUTH ONCE DAILY     blood glucose (NO BRAND SPECIFIED) lancets standard Use to test blood sugar 3 to 4  times daily or as directed.     blood glucose (NO BRAND SPECIFIED) test strip Use to test blood sugar 3 to 4 times daily or as directed.     blood glucose (ONETOUCH ULTRA) test strip TEST YOUR BLOOD SUGAR 3-4 TIMES PER DAY.     blood glucose monitoring (NO BRAND SPECIFIED) meter device kit Use to test blood sugar 3 to 4 times daily or as directed.     calcipotriene (DOVONOX) 0.005 % external ointment Use M-F in areas of psoriasis     carvedilol (COREG) 25 MG tablet Take 1 tablet (25 mg) by mouth 2 times daily (with meals)      "clindamycin (CLINDAMAX) 1 % topical gel Apply topically 2 times daily     Continuous Blood Gluc  (FREESTYLE PHOENIX READER) XX BELKYS 1 each See Admin Instructions Use per 's instructions to monitor glucose continuously.     Continuous Blood Gluc Sensor (FREESTYLE PHOENIX 14 DAY SENSOR) XX MISC 1 each every 14 days     desonide (DESOWEN) 0.05 % external ointment Apply topically 2 times daily Use Sat Sun to areas of psoriasis (Patient taking differently: Apply topically as needed Use Sat Sun to areas of psoriasis)     ferrous sulfate (FEROSUL) 325 (65 Fe) MG tablet TAKE 1 TABLET (325 MG) BY MOUTH 2 TIMES DAILY     furosemide (LASIX) 40 MG tablet TAKE 1 TABLET (40 MG) BY MOUTH 2 TIMES DAILY (Patient taking differently: 80 mg 2 times daily )     insulin aspart (NOVOLOG FLEXPEN) 100 UNIT/ML pen INJECT 4 UNITS SUBCUTANEOUSLY WITH BREAKFAST, LUNCH AND DINNER.     insulin glargine (BASAGLAR KWIKPEN) 100 UNIT/ML pen Inject 22 Units Subcutaneous At Bedtime Inject 22 U SubCut at HS     insulin pen needle (ULTICARE MINI) 31G X 6 MM Use daily or as directed.     order for DME Compression socks - knee  15-20 mmHg  Open toed.  Use daily.     order for DME Equipment being ordered: mattress overlay for hospital bed  Wt. 192#  Height 5'5\"  99 months/Lifetime     order for DME 1 SAD light     order for DME 1 wheelchair     sertraline (ZOLOFT) 50 MG tablet Take 1 tablet (50 mg) by mouth daily     sevelamer carbonate, RENVELA, (RENVELA) 0.8 GM PACK Packet Take 1 packet (0.8 g) by mouth 3 times daily (with meals)     triamcinolone (KENALOG) 0.1 % external cream Apply to sites of dermatitis- the abdomen, armpits, groin as needed.     vitamin D3 (CHOLECALCIFEROL) 2000 units (50 mcg) tablet Take 1 tablet (2,000 Units) by mouth daily     Current Facility-Administered Medications   Medication     bevacizumab (AVASTIN) intravitreal inj 1.25 mg      Vitals:  B/P 150-155/73-77  P 68  WT: not obtained    Exam:  GEN: Pleasant female " in NAD. Daughter present.  CARDIAC: RRR  LUNGS: CTA  ABDOMEN: Soft NT  EXT: Left LORI, Right no edema  NEURO: alert. Oriented. No asterixis    LABS:   CMP  Recent Labs   Lab Test 02/28/20  1156 02/14/20  1041 01/17/20  1204 12/17/19  1010    144 146* 142   POTASSIUM 5.1 5.2 5.3 4.8   CHLORIDE 111* 113* 114* 114*   CO2 19* 22 23 21   ANIONGAP 12 10 8 8   GLC 97 143* 127* 57*   * 106* 96* 84*   CR 6.12* 5.76* 5.35* 5.26*   GFRESTIMATED 6* 7* 7* 8*   GFRESTBLACK 7* 8* 9* 9*   JODY 8.9 8.7 8.4* 8.5     Recent Labs   Lab Test 01/17/20  1204 07/31/18  1148 07/24/18  0900 07/17/18  0830 06/24/18  0623 06/23/18  2347 03/29/18  1410 11/13/17  0715   BILITOTAL  --   --   --   --  0.2 0.2 0.2 0.3   ALKPHOS  --   --   --   --  49 56 56 70   ALT 14  --   --   --  17 13 15 37   AST 6 24 24 12 16 12 14 30     CBC  Recent Labs   Lab Test 02/28/20  1156 02/14/20  1041 01/31/20  1205 01/17/20  1250  07/09/19  1513  02/15/19  1459 01/18/19  1457   HGB 10.2* 9.8* 9.9* 9.5*   < > 9.3*   < > 10.3* 10.2*   WBC 6.1  --   --   --   --  6.3  --  5.5 5.4   RBC 3.48*  --   --   --   --  3.07*  --  3.40* 3.47*   HCT 33.0* 32.1* 31.9* 30.3*   < > 29.7*  --  31.6* 32.4*   MCV 95  --   --   --   --  97  --  93 93   MCH 29.3  --   --   --   --  30.3  --  30.3 29.4   MCHC 30.9*  --   --   --   --  31.3*  --  32.6 31.5   RDW 13.9  --   --   --   --  12.9  --  12.5 12.4     --   --   --   --  202  --  232 222    < > = values in this interval not displayed.     URINE STUDIES  Recent Labs   Lab Test 03/29/18  1448 01/25/18  0233 11/02/17  0602 10/26/16  1220  12/05/12  1541   COLOR Straw Light Yellow Light Yellow Yellow   < > Yellow   APPEARANCE Clear Clear Clear Slightly Cloudy   < > Clear   URINEGLC 50* Negative 300* >500*   < > 100*   URINEBILI Negative Negative Negative Negative   < > Negative   URINEKETONE Negative Negative Negative Negative   < > Negative   SG 1.008 1.007 1.010 1.014   < > 1.025   UBLD Negative Negative Negative  Negative   < > Small*   URINEPH 5.0 6.5 7.5* 6.0   < > 6.0   PROTEIN >499* 100* >600* >500*   < > 100*   UROBILINOGEN  --   --   --   --   --  0.2   NITRITE Negative Negative Negative Negative   < > Negative   LEUKEST Negative Negative Small* Small*   < > Negative   RBCU 1 0 1 5*   < >  --    WBCU 2 1 44* 54*   < >  --     < > = values in this interval not displayed.     Recent Labs   Lab Test 12/17/19  1013 05/24/19  1420 12/07/18  1417 11/01/18  1533   UTPG 6.51* 6.28* 5.60* 6.71*     PTH  Recent Labs   Lab Test 01/17/20  1204 12/17/19  1010 01/18/19  1457   PTHI 383* 350* 126*     IRON STUDIES  Recent Labs   Lab Test 02/14/20  1041 01/31/20  1205 01/17/20  1250   IRON 63 52 61   * 210* 212*   IRONSAT 30 25 29   ELENA 494* 549* 471*       Silva Guerrero, APRN, CNP          Silva Guerrero, NP

## 2020-03-03 NOTE — TELEPHONE ENCOUNTER
Coordinator spoke with pt's daughter, Caroline. Caroline states her mom is interested in transplant. Reviewed needed items for active status consideration:    1. María scan for cardiology clearance. Ok to hold off on scheduling until after physical therapy is completed.    2. Physical Therapy: d/t pt's deconditioning. Pt's PCP should be able to write order if insurance requires one. Coordinator unaware if pt will need to be assessed by one of our providers after she completed physical therapy. Will need physical therapy notes to determine if f/u appointment with one of our providers will be needed    3. Pt declined having our transplant nephrology PA assess if pt has coccyx ulcer. Will need documentation from a provider if pt has or does not have ulcer. If pt does have ulcer it will need to be healed prior to active status    4. Updated HLA/PRA, which can be drawn when all other items are completed    Reviewed depending upon how long the above items take there might be additional items needed for active status consideration. Requested daughter call back with updates on pt's progress.

## 2020-03-03 NOTE — NURSING NOTE
Chief Complaint   Patient presents with     RECHECK     CKD Stage 5       BP (!) 152/73 (BP Location: Right arm, Patient Position: Sitting, Cuff Size: Adult Large)   Pulse 68   Temp 98  F (36.7  C) (Oral)   SpO2 98%         Sujata Mcdonald CMA    3/3/2020 3:48 PM

## 2020-03-04 DIAGNOSIS — N18.5 CHRONIC KIDNEY DISEASE, STAGE 5, KIDNEY FAILURE (H): Primary | ICD-10-CM

## 2020-03-04 DIAGNOSIS — Z01.818 PRE-OP EVALUATION: ICD-10-CM

## 2020-03-04 NOTE — PROGRESS NOTES
Nephrology Clinic Visit 3/3/20    Assessment and Plan:     1. CKD5 w/proteinuria- Creat 6.1, eGFR 6 ml/mn, UPCR 6.5 g/gCr (12/19). Mild uremic symptoms   - Etiology of CKD felt to be DM, HTN, Vascular dz,    - Remains Inactive on transplant wait list but working with transplant team to become active on the list.     - Has been seen by Dr Martin and will need AVG for HD.    - We discussed the wisdom of moving forward with AVG placement and then initiation of dialysis once cleared by the Surgeon. Although both Supriya and Caroline were emotional about this decision, they are prepared and agreeable. We discussed that if intra operatively the Surgeon feels that an AVF would be possible then a Tunneled line will be placed while AVF maturing. I reviewed that I anticipate that HD will be started within the next 4-6 wks. Will have [facility] begin HD search once patient has had her AVG placement    - Does not use NSAIDs   - Blood pressures are borderline, more elevated today with anxiety around the visit    - DM with excellent control. A1c 5.2 % 2/20   - On statin for CV risk reduction     2. Volume status - Improved volume status. Edema has resolved with generally improved blood pressures. No weight today. Was 210# last visit and 212 # on 9/6/19.     - Continue Lasix 80 mg bid    - Continue Na restricted diet      3. HTN - Uncontrolled but improved. Clinic blood pressures 150 x 3/ but she is anxiouss. Day program blood pressures in the 140-150/ range, but no way to evaluate device.   Current regimen:    - lasix 80 bid   - Coreg 25 mg bid   - Amlodipine 5 mg bid    - Continue home monitoring   - Blood pressure goal < 130/80     4. Electrolytes - No acute concerns. K trending up. Has been running in the low 5's since 1/20. Currently 5.1, Na 141     5. Acid base - No acute concerns. Bicarb 21   - Has developed metabolic acidosis 2/2 worsening renal function   - Will correct on HD     6. BMD - Ca corrected 9.8, Phos 8.8 9 (rising),  Albumin 2.8 (declining)   - Vit D 23,  ()   - Continue Vit D 2000 U every day, but will hold Calcitriol given borderline hypercalcemia   - Continue Renvela 800 mg TID with meals. Will improve once we start HD     7. Anemia - Hgb 10.2    - Iron studies : Ferritin 600, Fe 66, IS 29.    - Enrolled in anemia management. Given Darbo 40 mcg q 14 dys   - On iron bid   - Colonoscopy , tubular adenomas     8. DM2 - Well controlled. A1c 5.2 %. On Insulin      9. Depression - Controlled on Zoloft 50 mg every day.       10. Disposition - RTC 4 wks for f/u and final HD preparations     Assessment and plan was discussed with patient and she voiced her understanding and agreement.     Reason for Visit:  CKD5/HTN f/u     HPI:  Ms Herr is a 71 yo female with CKD5 and nephrotic range proteinuria, DM2, HTN, in today for routine CKD f/u. Last seen by me on 19. Decision was made to have access placed and begin search for HD unit when GFR is 5.    Baseline creat now in the 6 range       Interval Hx:   No hospital admissions     ROS:   Edema has resolved since increasing her Furosemide.   Blood pressures improved. Home readings 140-150/.   No further dyspnea. .   Keeping her leg elevated more consistently  Has good appetite. Denies chest pain, abdominal pain. No bowel or bladder concerns. Decided to not get a new prosthesis.      Chronic Health Problems:     CKD5  Proteinuria  AKA, left (10/18)  T2DM  Vit D def  HTN  HLD  Anxiety/depression  Vitiligo  Non proliferative diabetic retinopathy  BCC  JONE  Anemia of CKD  Prior tobacco abuse     Personal Hx:   Lives in [facility], daughter Caroline upper level. NS. Attends day program at [facility] Monday through Thursday    Family Hx:   Family History   Problem Relation Age of Onset     Diabetes Mother      Hypertension Mother      Heart Disease Mother          of congestive heart failure     Eye Disorder Mother      Arthritis Mother      Obesity Mother       Heart Disease Father          from CHF     Cerebrovascular Disease Father      Arthritis Father      Musculoskeletal Disorder Other         has MS     Thyroid Disease Other      Eye Disorder Other         cataracts     Cancer Other         throat/liver     Skin Cancer No family hx of      Melanoma No family hx of      Glaucoma No family hx of      Macular Degeneration No family hx of          Allergies:  Allergies   Allergen Reactions     Penicillins Rash     Unasyn Rash     No evidence SJS, but very uncomfortable and precipitated multiple provider visits. Would not use penicillins again if other options available.        Medications:  Current Outpatient Medications   Medication Sig     acetaminophen (TYLENOL) 325 MG tablet Take 2 tablets (650 mg) by mouth every 4 hours as needed for mild pain     albuterol (PROAIR HFA/PROVENTIL HFA/VENTOLIN HFA) 108 (90 Base) MCG/ACT inhaler Inhale 2 puffs into the lungs every 6 hours as needed for shortness of breath / dyspnea or wheezing     amLODIPine (NORVASC) 5 MG tablet Take 1 tablet (5 mg) by mouth 2 times daily     apremilast (OTEZLA) 30 MG tablet Take 1 tablet (30 mg) by mouth 2 times daily     atorvastatin (LIPITOR) 20 MG tablet TAKE 1 TABLET BY MOUTH ONCE DAILY     blood glucose (NO BRAND SPECIFIED) lancets standard Use to test blood sugar 3 to 4  times daily or as directed.     blood glucose (NO BRAND SPECIFIED) test strip Use to test blood sugar 3 to 4 times daily or as directed.     blood glucose (ONETOUCH ULTRA) test strip TEST YOUR BLOOD SUGAR 3-4 TIMES PER DAY.     blood glucose monitoring (NO BRAND SPECIFIED) meter device kit Use to test blood sugar 3 to 4 times daily or as directed.     calcipotriene (DOVONOX) 0.005 % external ointment Use M-F in areas of psoriasis     carvedilol (COREG) 25 MG tablet Take 1 tablet (25 mg) by mouth 2 times daily (with meals)     clindamycin (CLINDAMAX) 1 % topical gel Apply topically 2 times daily     Continuous Blood Gluc  " (FREESTYLE PHOENIX READER) XX BELKYS 1 each See Admin Instructions Use per 's instructions to monitor glucose continuously.     Continuous Blood Gluc Sensor (FREESTYLE PHOENIX 14 DAY SENSOR) XX MISC 1 each every 14 days     desonide (DESOWEN) 0.05 % external ointment Apply topically 2 times daily Use Sat Sun to areas of psoriasis (Patient taking differently: Apply topically as needed Use Sat Sun to areas of psoriasis)     ferrous sulfate (FEROSUL) 325 (65 Fe) MG tablet TAKE 1 TABLET (325 MG) BY MOUTH 2 TIMES DAILY     furosemide (LASIX) 40 MG tablet TAKE 1 TABLET (40 MG) BY MOUTH 2 TIMES DAILY (Patient taking differently: 80 mg 2 times daily )     insulin aspart (NOVOLOG FLEXPEN) 100 UNIT/ML pen INJECT 4 UNITS SUBCUTANEOUSLY WITH BREAKFAST, LUNCH AND DINNER.     insulin glargine (BASAGLAR KWIKPEN) 100 UNIT/ML pen Inject 22 Units Subcutaneous At Bedtime Inject 22 U SubCut at HS     insulin pen needle (ULTICARE MINI) 31G X 6 MM Use daily or as directed.     order for DME Compression socks - knee  15-20 mmHg  Open toed.  Use daily.     order for DME Equipment being ordered: mattress overlay for hospital bed  Wt. 192#  Height 5'5\"  99 months/Lifetime     order for DME 1 SAD light     order for DME 1 wheelchair     sertraline (ZOLOFT) 50 MG tablet Take 1 tablet (50 mg) by mouth daily     sevelamer carbonate, RENVELA, (RENVELA) 0.8 GM PACK Packet Take 1 packet (0.8 g) by mouth 3 times daily (with meals)     triamcinolone (KENALOG) 0.1 % external cream Apply to sites of dermatitis- the abdomen, armpits, groin as needed.     vitamin D3 (CHOLECALCIFEROL) 2000 units (50 mcg) tablet Take 1 tablet (2,000 Units) by mouth daily     Current Facility-Administered Medications   Medication     bevacizumab (AVASTIN) intravitreal inj 1.25 mg      Vitals:  B/P 150-155/73-77  P 68  WT: not obtained    Exam:  GEN: Pleasant female in NAD. Daughter present.  CARDIAC: RRR  LUNGS: CTA  ABDOMEN: Soft NT  EXT: Left LORI, Right no " edema  NEURO: alert. Oriented. No asterixis    LABS:   CMP  Recent Labs   Lab Test 02/28/20  1156 02/14/20  1041 01/17/20  1204 12/17/19  1010    144 146* 142   POTASSIUM 5.1 5.2 5.3 4.8   CHLORIDE 111* 113* 114* 114*   CO2 19* 22 23 21   ANIONGAP 12 10 8 8   GLC 97 143* 127* 57*   * 106* 96* 84*   CR 6.12* 5.76* 5.35* 5.26*   GFRESTIMATED 6* 7* 7* 8*   GFRESTBLACK 7* 8* 9* 9*   JODY 8.9 8.7 8.4* 8.5     Recent Labs   Lab Test 01/17/20  1204 07/31/18  1148 07/24/18  0900 07/17/18  0830 06/24/18  0623 06/23/18  2347 03/29/18  1410 11/13/17  0715   BILITOTAL  --   --   --   --  0.2 0.2 0.2 0.3   ALKPHOS  --   --   --   --  49 56 56 70   ALT 14  --   --   --  17 13 15 37   AST 6 24 24 12 16 12 14 30     CBC  Recent Labs   Lab Test 02/28/20  1156 02/14/20  1041 01/31/20  1205 01/17/20  1250  07/09/19  1513  02/15/19  1459 01/18/19  1457   HGB 10.2* 9.8* 9.9* 9.5*   < > 9.3*   < > 10.3* 10.2*   WBC 6.1  --   --   --   --  6.3  --  5.5 5.4   RBC 3.48*  --   --   --   --  3.07*  --  3.40* 3.47*   HCT 33.0* 32.1* 31.9* 30.3*   < > 29.7*  --  31.6* 32.4*   MCV 95  --   --   --   --  97  --  93 93   MCH 29.3  --   --   --   --  30.3  --  30.3 29.4   MCHC 30.9*  --   --   --   --  31.3*  --  32.6 31.5   RDW 13.9  --   --   --   --  12.9  --  12.5 12.4     --   --   --   --  202  --  232 222    < > = values in this interval not displayed.     URINE STUDIES  Recent Labs   Lab Test 03/29/18  1448 01/25/18  0233 11/02/17  0602 10/26/16  1220  12/05/12  1541   COLOR Straw Light Yellow Light Yellow Yellow   < > Yellow   APPEARANCE Clear Clear Clear Slightly Cloudy   < > Clear   URINEGLC 50* Negative 300* >500*   < > 100*   URINEBILI Negative Negative Negative Negative   < > Negative   URINEKETONE Negative Negative Negative Negative   < > Negative   SG 1.008 1.007 1.010 1.014   < > 1.025   UBLD Negative Negative Negative Negative   < > Small*   URINEPH 5.0 6.5 7.5* 6.0   < > 6.0   PROTEIN >499* 100* >600* >500*   <  > 100*   UROBILINOGEN  --   --   --   --   --  0.2   NITRITE Negative Negative Negative Negative   < > Negative   LEUKEST Negative Negative Small* Small*   < > Negative   RBCU 1 0 1 5*   < >  --    WBCU 2 1 44* 54*   < >  --     < > = values in this interval not displayed.     Recent Labs   Lab Test 12/17/19  1013 05/24/19  1420 12/07/18  1417 11/01/18  1533   UTPG 6.51* 6.28* 5.60* 6.71*     PTH  Recent Labs   Lab Test 01/17/20  1204 12/17/19  1010 01/18/19  1457   PTHI 383* 350* 126*     IRON STUDIES  Recent Labs   Lab Test 02/14/20  1041 01/31/20  1205 01/17/20  1250   IRON 63 52 61   * 210* 212*   IRONSAT 30 25 29   ELENA 494* 549* 471*       Silva Guerrero, APRN, CNP

## 2020-03-05 ENCOUNTER — PREP FOR PROCEDURE (OUTPATIENT)
Dept: TRANSPLANT | Facility: CLINIC | Age: 70
End: 2020-03-05

## 2020-03-05 DIAGNOSIS — Z99.2 ENCOUNTER REGARDING VASCULAR ACCESS FOR DIALYSIS FOR ESRD (H): ICD-10-CM

## 2020-03-05 DIAGNOSIS — N18.5 CHRONIC KIDNEY DISEASE, STAGE 5, KIDNEY FAILURE (H): Primary | ICD-10-CM

## 2020-03-05 DIAGNOSIS — N18.6 ENCOUNTER REGARDING VASCULAR ACCESS FOR DIALYSIS FOR ESRD (H): ICD-10-CM

## 2020-03-09 NOTE — TELEPHONE ENCOUNTER
FUTURE VISIT INFORMATION      SURGERY INFORMATION:    Date: 3/26/2000    Location: [facility] OR    Surgeon:  Angelita Martin MD     Anesthesia Type:  Combined General with Block    Procedure: Left upper arm brachial artery to axillary vein arteriovenous bovine graft creation with intraoperative ultrasound     RECORDS REQUESTED FROM:       Primary Care Provider: Noam Jackson MD [facility]    Pertinent Medical History: JONE, hypertension, CHF    Most recent EKG+ Tracin19    Most recent ECHO: 3/29/18    Most recent Cardiac Stress Test: 19    Most recent PFT's: 18    Most recent Sleep Study:  13

## 2020-03-12 ENCOUNTER — MYC REFILL (OUTPATIENT)
Dept: NEPHROLOGY | Facility: CLINIC | Age: 70
End: 2020-03-12

## 2020-03-12 ENCOUNTER — MYC REFILL (OUTPATIENT)
Dept: FAMILY MEDICINE | Facility: CLINIC | Age: 70
End: 2020-03-12

## 2020-03-12 DIAGNOSIS — E55.9 VITAMIN D DEFICIENCY: ICD-10-CM

## 2020-03-12 DIAGNOSIS — E78.5 HYPERLIPIDEMIA LDL GOAL <100: ICD-10-CM

## 2020-03-12 RX ORDER — ATORVASTATIN CALCIUM 20 MG/1
20 TABLET, FILM COATED ORAL DAILY
Qty: 90 TABLET | Refills: 3 | Status: CANCELLED | OUTPATIENT
Start: 2020-03-12

## 2020-03-12 NOTE — TELEPHONE ENCOUNTER
"Requested Prescriptions   Pending Prescriptions Disp Refills     atorvastatin (LIPITOR) 20 MG tablet [Pharmacy Med Name: ATORVASTATIN 20 MG TABLET] 90 tablet 3     Sig: TAKE 1 TABLET BY MOUTH EVERY DAY  Last Written Prescription Date:  10/09/2018  Last Fill Quantity: 90,  # refills: 3   Last office visit: 1/28/2020 with prescribing provider:  01/28/2020   Future Office Visit:         Statins Protocol Passed - 3/12/2020 12:40 PM        Passed - LDL on file in past 12 months     Recent Labs   Lab Test 01/17/20  1204   LDL 74             Passed - No abnormal creatine kinase in past 12 months     Recent Labs   Lab Test 11/05/17  0903   CKT 18*                Passed - Recent (12 mo) or future (30 days) visit within the authorizing provider's specialty     Patient has had an office visit with the authorizing provider or a provider within the authorizing providers department within the previous 12 mos or has a future within next 30 days. See \"Patient Info\" tab in inbasket, or \"Choose Columns\" in Meds & Orders section of the refill encounter.              Passed - Medication is active on med list        Passed - Patient is age 18 or older        Passed - No active pregnancy on record        Passed - No positive pregnancy test in past 12 months           "

## 2020-03-13 ENCOUNTER — ANESTHESIA EVENT (OUTPATIENT)
Dept: SURGERY | Facility: CLINIC | Age: 70
End: 2020-03-13
Payer: MEDICARE

## 2020-03-13 ENCOUNTER — ALLIED HEALTH/NURSE VISIT (OUTPATIENT)
Dept: TRANSPLANT | Facility: CLINIC | Age: 70
End: 2020-03-13
Payer: MEDICARE

## 2020-03-13 ENCOUNTER — PRE VISIT (OUTPATIENT)
Dept: SURGERY | Facility: CLINIC | Age: 70
End: 2020-03-13

## 2020-03-13 ENCOUNTER — TELEPHONE (OUTPATIENT)
Dept: TRANSPLANT | Facility: CLINIC | Age: 70
End: 2020-03-13

## 2020-03-13 ENCOUNTER — OFFICE VISIT (OUTPATIENT)
Dept: SURGERY | Facility: CLINIC | Age: 70
End: 2020-03-13
Attending: CLINICAL NURSE SPECIALIST
Payer: MEDICARE

## 2020-03-13 VITALS
RESPIRATION RATE: 16 BRPM | BODY MASS INDEX: 33.89 KG/M2 | SYSTOLIC BLOOD PRESSURE: 137 MMHG | HEART RATE: 59 BPM | DIASTOLIC BLOOD PRESSURE: 73 MMHG | HEIGHT: 66 IN | OXYGEN SATURATION: 96 % | TEMPERATURE: 97.7 F

## 2020-03-13 DIAGNOSIS — N18.5 ANEMIA IN STAGE 5 CHRONIC KIDNEY DISEASE, NOT ON CHRONIC DIALYSIS (H): Primary | ICD-10-CM

## 2020-03-13 DIAGNOSIS — N18.5 CKD (CHRONIC KIDNEY DISEASE) STAGE 5, GFR LESS THAN 15 ML/MIN (H): ICD-10-CM

## 2020-03-13 DIAGNOSIS — N18.5 ANEMIA OF CHRONIC RENAL FAILURE, STAGE 5 (H): ICD-10-CM

## 2020-03-13 DIAGNOSIS — Z01.818 PREOP EXAMINATION: Primary | ICD-10-CM

## 2020-03-13 DIAGNOSIS — D63.1 ANEMIA OF CHRONIC RENAL FAILURE, STAGE 5 (H): ICD-10-CM

## 2020-03-13 DIAGNOSIS — D63.1 ANEMIA IN STAGE 5 CHRONIC KIDNEY DISEASE, NOT ON CHRONIC DIALYSIS (H): Primary | ICD-10-CM

## 2020-03-13 LAB
FERRITIN SERPL-MCNC: 561 NG/ML (ref 8–252)
HCT VFR BLD AUTO: 31.6 % (ref 35–47)
HGB BLD-MCNC: 9.8 G/DL (ref 11.7–15.7)
IRON SATN MFR SERPL: 38 % (ref 15–46)
IRON SERPL-MCNC: 80 UG/DL (ref 35–180)
TIBC SERPL-MCNC: 210 UG/DL (ref 240–430)

## 2020-03-13 PROCEDURE — 40000269 ZZH STATISTIC NO CHARGE FACILITY FEE: Mod: ZF

## 2020-03-13 PROCEDURE — 36415 COLL VENOUS BLD VENIPUNCTURE: CPT

## 2020-03-13 PROCEDURE — 96372 THER/PROPH/DIAG INJ SC/IM: CPT

## 2020-03-13 PROCEDURE — 82728 ASSAY OF FERRITIN: CPT

## 2020-03-13 PROCEDURE — 83550 IRON BINDING TEST: CPT

## 2020-03-13 PROCEDURE — 25000128 H RX IP 250 OP 636: Mod: ZF

## 2020-03-13 PROCEDURE — 85018 HEMOGLOBIN: CPT

## 2020-03-13 PROCEDURE — 83540 ASSAY OF IRON: CPT

## 2020-03-13 PROCEDURE — 85014 HEMATOCRIT: CPT

## 2020-03-13 RX ORDER — CHOLECALCIFEROL (VITAMIN D3) 50 MCG
2000 TABLET ORAL DAILY
Qty: 100 TABLET | Refills: 3 | Status: SHIPPED | OUTPATIENT
Start: 2020-03-13 | End: 2020-05-20

## 2020-03-13 RX ORDER — ATORVASTATIN CALCIUM 20 MG/1
20 TABLET, FILM COATED ORAL EVERY EVENING
Qty: 90 TABLET | Refills: 2 | Status: SHIPPED | OUTPATIENT
Start: 2020-03-13 | End: 2020-08-11

## 2020-03-13 RX ADMIN — DARBEPOETIN ALFA 40 MCG: 40 INJECTION, SOLUTION INTRAVENOUS; SUBCUTANEOUS at 11:43

## 2020-03-13 ASSESSMENT — LIFESTYLE VARIABLES: TOBACCO_USE: 1

## 2020-03-13 ASSESSMENT — ENCOUNTER SYMPTOMS: SEIZURES: 0

## 2020-03-13 NOTE — TELEPHONE ENCOUNTER
Received Epic message from [place] office:  Caroline Gray,  daughter # [phone] regarding upcoming procedure Fistula placement 03/26/2020 with Dr. Martin     Reason for call: Caroline Gray has question about the procedure  Call back needed? Yes    Writer called back to patient's daughter Caroline Gray and left VM to call back.

## 2020-03-13 NOTE — TELEPHONE ENCOUNTER
Patient's daughter Caroline Gray called back.  Patient is scheduled left brachial artery to axillary vein AV bovine graft creation under General/block with Dr. Martin on 3/26/2020. Cr. 6.28, GFR 6 on 2/28/20. She has Hx of left BKA  Patient s daughter called who has concerns of patient is unable to transfer herself after left arm surgery with block and LLE BKA. Normally, Patient uses both arms to support herself to transfer to and from wheelchair. Daughter stressed that she is unable to assist patient for any transferring from wheelchair to car or at home activities (to bed, toilet etc.). Patient s daughter requested to admit patient to [facility] after surgery, to allow time to recover from block anesthesia. Daughter declines to bring patient back to clinic to discuss with Dr. Martin, because she does not want to delay surgery.  Emailed to Dr. Martin regarding above patient's daughter request to discuss with the patient and daughter on the day of surgery. Left upper arm AV fistula (BC) could be considered if intraoperative US shows she has adequate vein size.  Awaiting Dr. Martin replied e-mail.

## 2020-03-13 NOTE — PATIENT INSTRUCTIONS
Preparing for Your Surgery      Name:  Supriya Herr   MRN:  [id number]   :  1950   Today's Date:  3/13/2020     Arriving for surgery:  Surgery date:  2020  Arrival time:  5:15 am  Due to the heightened awareness and concerns related to Covid 19 we are asking that only one person accompany you for your procedure and stay.   Please come to:     [facility]   [address] [place]  [address]    - ? parking is available in front of the hospital   -    Please proceed to the Surgery Lounge on the 3rd floor. [phone]?   - ?If you are in need of directions, wheelchair or escort please stop at the Information Desk in the lobby.       What can I eat or drink?  -  You may have solid food or milk products until 8 hours prior to your surgery.(3/25/20 at 11 pm)  -  You may have water, apple juice or 7up/Sprite until 2 hours prior to your surgery.(until 5:15 am arrival time)    Which medicines can I take?        Hold Aspirin, vitamins and supplements one week prior to surgery.        Hold Ibuprofen for 24 hours and/or Naproxen for 48 hours prior to surgery.         Hold Otezla (apremilast) for 3 days before surgery. Take last dose on 3/22/20     -  Do NOT take these medications in the morning, the day of surgery:  Ferrous Sulfate   Regular (short acting) insulin  Sevelamer carbonate (Renvela)      Please take 80% of usual pm insulin glargine dose the evening before surgery (17 units)      -  Please take these medications the day of surgery:  Acetaminophen if needed  Inhaler as usual and bring to hospital  Amlodipine  Atorvastatin  Carvedilol  Furosemide   Vitamin D      How do I prepare myself?  -  Take two showers: one the night before surgery; and one the morning of surgery.         Use Scrubcare or Hibiclens to wash from neck down, leave soap on your skin for up to one minute.        Do not get soap in your eyes or ears.  You may use your own shampoo  and conditioner; no other hair products.   -  Do NOT use lotion, powder, deodorant, or antiperspirant the day of your surgery.  -  Do NOT wear any makeup, fingernail polish or jewelry.  - Do not bring your own medications to the hospital, except for inhalers and eye   drops.  -  Bring your ID and insurance card.      -If you are scheduled to go home the Same Day as surgery you must have a responsible adult as a  and to stay with you overnight the first 24 hours after surgery.     Questions or Concerns:    -Please call the Pre Admission Nursing Office at [phone].       -If you have health changes between today and your surgery please call your surgeon.     For questions after surgery please call your surgeons office.

## 2020-03-13 NOTE — PROGRESS NOTES
Frequency: Every 14 days  Most recent or today's HGB: 9.8   Date: 3/13/20  Date of lat dose: 20  HGB associated with last dose given: 9.9    Blood Pressure:165/51    Diagnosis: CKD    Ordered by: Ethel Guerrero NP  VIS Offered: Yes    Double Checked by: Sujata Mcdonald CMS    See MAR for administration details    Pt's first name, last name and  verified prior to medication administration, injection given without complications or questions.   Darlene Levy/SHERI  2020 11:08 AM
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
## 2020-03-16 ENCOUNTER — MYC MEDICAL ADVICE (OUTPATIENT)
Dept: OPHTHALMOLOGY | Facility: CLINIC | Age: 70
End: 2020-03-16

## 2020-03-16 ENCOUNTER — TELEPHONE (OUTPATIENT)
Dept: PHARMACY | Facility: CLINIC | Age: 70
End: 2020-03-16

## 2020-03-16 ENCOUNTER — TELEPHONE (OUTPATIENT)
Dept: TRANSPLANT | Facility: CLINIC | Age: 70
End: 2020-03-16

## 2020-03-16 ENCOUNTER — CARE COORDINATION (OUTPATIENT)
Dept: NEPHROLOGY | Facility: CLINIC | Age: 70
End: 2020-03-16

## 2020-03-16 DIAGNOSIS — N18.5 CKD (CHRONIC KIDNEY DISEASE) STAGE 5, GFR LESS THAN 15 ML/MIN (H): Primary | ICD-10-CM

## 2020-03-16 NOTE — PROGRESS NOTES
Dr. Basilio spoke with patient's daughter, and sent updated note to Dr. Martin's team. Will close this encounter as a duplicate.    Adrianna Rajput RN

## 2020-03-16 NOTE — TELEPHONE ENCOUNTER
Anemia Management Note  SUBJECTIVE/OBJECTIVE:  Referred by Ethel Guerrero NP on 2020  Primary Diagnosis: Anemia in Chronic Kidney Disease (N18.5, D63.1)     Secondary Diagnosis:  Chronic Kidney Disease, Stage 5 (N18.5)  Hgb goal range:  9-10   Epo/Darbo: Aranesp 40mcg every 14 days for Hgb <10. In Clinic  Iron regimen:  Ferrous Sulfate daily. (19)  Labs : 2020  Recent KELLIE use, transfusion, IV iron: Unknown  RX/TX plans : 10/06/2020  No history of stroke and blood clots.  Hx of HTN, CHF and Basal Cell Carcinoma ()        Contact:            Ok to leave message regarding Medical, Billing and Scheduling information per consent to communicate dated 10/19/2015                              OK to speak with Caroline Sandoval (daughter) and Caroline Baird (sister) regarding Medical, Billing and Scheduling information per consent to communicate dated 10/19/2015    Anemia Latest Ref Rng & Units 2019 1/3/2020 2020 2020 2020 2020 3/13/2020   KELLIE Dose - 40mcg - 40mcg 40mcg 40mcg - 40mcg   Hemoglobin 11.7 - 15.7 g/dL 9.9(L) 10.5(L) 9.5(L) 9.9(L) 9.8(L) 10.2(L) 9.8(L)   TSAT 15 - 46 % - - 29 25 30 - 38   Ferritin 8 - 252 ng/mL - - 471(H) 549(H) 494(H) - 561(H)     BP Readings from Last 3 Encounters:   20 137/73   20 (!) 150/74   20 (!) 157/72     Wt Readings from Last 2 Encounters:   19 95.3 kg (210 lb)   19 95.7 kg (211 lb)           ASSESSMENT:  Hgb:At goal - recommend dose  TSat: at goal >30% Ferritin: At goal (>100ng/mL)    PLAN:  Dose with aranesp and RTC for hgb then aranesp if needed in 2 week(s)    Orders needed to be renewed (for next follow-up date) in EPIC: None    Iron labs due:  4 weeks    Plan discussed with:  No call made. Daughter schedules all appts.   Plan provided by:  mingo    NEXT FOLLOW-UP DATE:  3/30/2020    Leslie Rivas RN   Anemia Services  [address] [address]    [email or web address]   Office : [phone]  Fax: [phone]

## 2020-03-16 NOTE — PROGRESS NOTES
Spoke with Eufemia with Dr. Martin's office. She had called Caroline to re-schedule the vascular surgery next week due to COVID-19, and the daughter expressed concern of this being cancelled. She's worried about her mom needing dialysis urgently.    Left message for Caroline to call back.    Adrianna Rajput RN

## 2020-03-17 DIAGNOSIS — E11.3213 TYPE 2 DIABETES MELLITUS WITH MILD NONPROLIFERATIVE RETINOPATHY OF BOTH EYES AND MACULAR EDEMA, UNSPECIFIED WHETHER LONG TERM INSULIN USE (H): Primary | ICD-10-CM

## 2020-03-17 NOTE — TELEPHONE ENCOUNTER
Based on COVID-19 command center message:  COVID-19 update: Elective surgeries postponed effective March 18  effective Wednesday, March 18, all elective, non-urgent procedures, including procedures in the operating room, gastrointestinal endoscopy, interventional radiology, and cardiology procedures will be postponed until further notice.    Writer notified Dr. Martin's AA Celeste to reschedule all elective vascular access surgeries in next 2 weeks to April 2020.   Received a call from Dr. Martin, recommended LUE AV graft surgery needs to be rescheduled in April 2020. Very unlikely patient will be admitted to hospital post op unless any complications occurred or urgent medical needs required. If patient required initiation of dialysis prior to surgery, CVC tunneled line placement as an outpatient could be considered. Dr. Martin asked writer to discuss with Dr. Basilio. Writer verbalized acceptance and understanding of plan.  Writer spoke with Dr. Basilio regarding Dr. Martin recommendations above,  and asked Dr. Basilio to call patient's daughter to discuss. Dr. Basilio verbalized acceptance and understanding of plan, patient's daughter's phone # provided.    Epic staff message from Dr. Basilio below:  Kathryn Basilio MD Kao, Yeewan S, APRN CNS; Angelita Martin MD; Adrianna Rajput, SHANTA; Silva Guerrero NP Hi Raja and Sum   Re: Supriya, I spoke with her daughter Caroline Gray   Her GFR is 6, BUN over 100   Clinically she is mildly symptomatic but may hang in there a few more weeks, but really, she needs access and high risk to need catheter in coming weeks without.     Another big concern is the fact that she uses her arms to transfer from bed to chair and she is in wheelchair and needs arms for that.   Daughter is concerned that she wont be able to transfer herself as she cannot lift weight and will be numb.  I am wondering if surgery / observation to possible nursing home placement  may be necessary, or do you think she will be able to transfer after surgery and go home , but if not then need to be admitted?     Thanks   Kathryn

## 2020-03-20 ENCOUNTER — TELEPHONE (OUTPATIENT)
Dept: TRANSPLANT | Facility: CLINIC | Age: 70
End: 2020-03-20

## 2020-03-20 ENCOUNTER — CARE COORDINATION (OUTPATIENT)
Dept: NEPHROLOGY | Facility: CLINIC | Age: 70
End: 2020-03-20

## 2020-03-20 NOTE — TELEPHONE ENCOUNTER
Per COVID-19 System Command Center  effective Wednesday, March 18, all elective, non-urgent procedures, including procedures in the operating room, gastrointestinal endoscopy, interventional radiology, and cardiology procedures will be postponed until further notice.    Left upper arm brachial artery to axillary vein AV (bovine) graft creation with intraoperative ultrasound has been rescheduled on 5/7/2020 with OR time at 7:45 am.     Writer called patient's daughter Caroline Gray regarding elective surgeries postponed, which ordered by the [facility] government D/T COVID-19 crisis. Daughter expressed concerns about patient's kidney function may declined further while waiting for surgery. Writer explained that Dr. Basilio has communicated with the team, she will be seeing the patient on 4/10/2020 to re-evaluate her kidney functions for dialysis plan. Daughter would like to discuss with Dr. Basilio, nephrology RN CC phone # provided for contact. Patient's daughter Caroline Gray verbalized acceptance and understanding of plan.   [place] [facility]  [place] daughter to review the entire surgical appts.
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
## 2020-03-23 NOTE — TELEPHONE ENCOUNTER
FUTURE VISIT INFORMATION      SURGERY INFORMATION:    Date: 20    Location: [facility] OR    Surgeon:  Angelita Martin MD     Anesthesia Type:  Combined General with Block     Procedure: Left upper arm brachial artery to axillary vein arteriovenous bovine graft creation with intraoperative ultrasound     RECORDS REQUESTED FROM:       Primary Care Provider: Noam Jackson MD [facility]    Pertinent Medical History: JONE, hypertension, CHF    Most recent EKG+ Tracin19    Most recent ECHO: 3/29/18    Most recent Cardiac Stress Test: 19    Most recent PFT's: 18    Most recent Sleep Study: 13

## 2020-03-25 ENCOUNTER — TELEPHONE (OUTPATIENT)
Dept: DERMATOLOGY | Facility: CLINIC | Age: 70
End: 2020-03-25

## 2020-03-25 NOTE — TELEPHONE ENCOUNTER
I called and left Caroline Gray (Daughter) a VM asking for them to give us a call back in regards to their upcoming appointment . Due to the COVID-19  virus [place] [facility] [place] asking patients who do not need to be seen in a urgent matter if they can reschedule for 4-6 weeks. Clinic number provided and notified that they can respond via [facility] [employer] if needed..

## 2020-03-27 ENCOUNTER — ALLIED HEALTH/NURSE VISIT (OUTPATIENT)
Dept: TRANSPLANT | Facility: CLINIC | Age: 70
End: 2020-03-27
Payer: MEDICARE

## 2020-03-27 DIAGNOSIS — N18.5 CKD (CHRONIC KIDNEY DISEASE) STAGE 5, GFR LESS THAN 15 ML/MIN (H): ICD-10-CM

## 2020-03-27 DIAGNOSIS — N18.9 CHRONIC KIDNEY DISEASE: Primary | ICD-10-CM

## 2020-03-27 DIAGNOSIS — N18.5 ANEMIA OF CHRONIC RENAL FAILURE, STAGE 5 (H): ICD-10-CM

## 2020-03-27 DIAGNOSIS — D63.1 ANEMIA OF CHRONIC RENAL FAILURE, STAGE 5 (H): ICD-10-CM

## 2020-03-27 LAB
ALBUMIN SERPL-MCNC: 2.8 G/DL (ref 3.4–5)
ANION GAP SERPL CALCULATED.3IONS-SCNC: 8 MMOL/L (ref 3–14)
BUN SERPL-MCNC: 94 MG/DL (ref 7–30)
CALCIUM SERPL-MCNC: 9.1 MG/DL (ref 8.5–10.1)
CHLORIDE SERPL-SCNC: 112 MMOL/L (ref 94–109)
CO2 SERPL-SCNC: 23 MMOL/L (ref 20–32)
CREAT SERPL-MCNC: 6.45 MG/DL (ref 0.52–1.04)
GFR SERPL CREATININE-BSD FRML MDRD: 6 ML/MIN/{1.73_M2}
GLUCOSE SERPL-MCNC: 100 MG/DL (ref 70–99)
HCT VFR BLD AUTO: 33.9 % (ref 35–47)
HGB BLD-MCNC: 10.4 G/DL (ref 11.7–15.7)
PHOSPHATE SERPL-MCNC: 7 MG/DL (ref 2.5–4.5)
POTASSIUM SERPL-SCNC: 4.4 MMOL/L (ref 3.4–5.3)
SODIUM SERPL-SCNC: 142 MMOL/L (ref 133–144)

## 2020-03-27 PROCEDURE — 85014 HEMATOCRIT: CPT

## 2020-03-27 PROCEDURE — 80069 RENAL FUNCTION PANEL: CPT

## 2020-03-27 PROCEDURE — 85018 HEMOGLOBIN: CPT

## 2020-03-27 PROCEDURE — 36415 COLL VENOUS BLD VENIPUNCTURE: CPT

## 2020-03-30 ENCOUNTER — TELEPHONE (OUTPATIENT)
Dept: PHARMACY | Facility: CLINIC | Age: 70
End: 2020-03-30

## 2020-03-30 NOTE — TELEPHONE ENCOUNTER
Anemia Management Note  SUBJECTIVE/OBJECTIVE:  Referred by Ethel Guerrero NP on 2020  Primary Diagnosis: Anemia in Chronic Kidney Disease (N18.5, D63.1)     Secondary Diagnosis:  Chronic Kidney Disease, Stage 5 (N18.5)  Hgb goal range:  9-10   Epo/Darbo: Aranesp 40mcg every 14 days for Hgb <10. In Clinic  Iron regimen:  Ferrous Sulfate daily. (19)  Labs : 2020  Recent KELLIE use, transfusion, IV iron: Unknown  RX/TX plans : 10/06/2020  No history of stroke and blood clots.  Hx of HTN, CHF and Basal Cell Carcinoma ()        Contact:            Ok to leave message regarding Medical, Billing and Scheduling information per consent to communicate dated 10/19/2015                              OK to speak with Caroline Sandoval (daughter) and Caroline Baird (sister) regarding Medical, Billing and Scheduling information per consent to communicate dated 10/19/2015    Anemia Latest Ref Rng & Units 1/3/2020 2020 2020 2020 2020 3/13/2020 3/27/2020   KELLIE Dose - - 40mcg 40mcg 40mcg - 40mcg -   Hemoglobin 11.7 - 15.7 g/dL 10.5(L) 9.5(L) 9.9(L) 9.8(L) 10.2(L) 9.8(L) 10.4(L)   TSAT 15 - 46 % - 29 25 30 - 38 -   Ferritin 8 - 252 ng/mL - 471(H) 549(H) 494(H) - 561(H) -     BP Readings from Last 3 Encounters:   20 137/73   20 (!) 150/74   20 (!) 157/72     Wt Readings from Last 2 Encounters:   19 95.3 kg (210 lb)   19 95.7 kg (211 lb)           ASSESSMENT:  Hgb:Above goal - recommend hold dose  TSat: at goal >30% Ferritin: At goal (>100ng/mL)    PLAN:  Hold Aranesp and RTC for hgb then aranesp if needed in 2 week(s)    Orders needed to be renewed (for next follow-up date) in EPIC: None    Iron labs due:  4/10/2020    Plan discussed with:  No call made at this time.   Plan provided by:  mingo    NEXT FOLLOW-UP DATE:  2020    Leslie Rivas RN   Anemia Services  [address] [address]   [email or web address]   Office :  [phone]  Fax: [phone]

## 2020-04-01 ENCOUNTER — DOCUMENTATION ONLY (OUTPATIENT)
Dept: ENDOCRINOLOGY | Facility: CLINIC | Age: 70
End: 2020-04-01

## 2020-04-01 ENCOUNTER — VIRTUAL VISIT (OUTPATIENT)
Dept: NEPHROLOGY | Facility: CLINIC | Age: 70
End: 2020-04-01
Payer: MEDICARE

## 2020-04-01 DIAGNOSIS — E11.40 TYPE 2 DIABETES MELLITUS WITH DIABETIC NEUROPATHY, WITH LONG-TERM CURRENT USE OF INSULIN (H): ICD-10-CM

## 2020-04-01 DIAGNOSIS — L40.8 INVERSE PSORIASIS: ICD-10-CM

## 2020-04-01 DIAGNOSIS — Z79.4 TYPE 2 DIABETES MELLITUS WITH DIABETIC NEUROPATHY, WITH LONG-TERM CURRENT USE OF INSULIN (H): ICD-10-CM

## 2020-04-01 DIAGNOSIS — D63.1 ANEMIA DUE TO STAGE 5 CHRONIC KIDNEY DISEASE, NOT ON CHRONIC DIALYSIS (H): ICD-10-CM

## 2020-04-01 DIAGNOSIS — N18.5 ANEMIA DUE TO STAGE 5 CHRONIC KIDNEY DISEASE, NOT ON CHRONIC DIALYSIS (H): ICD-10-CM

## 2020-04-01 DIAGNOSIS — N18.5 CKD (CHRONIC KIDNEY DISEASE) STAGE 5, GFR LESS THAN 15 ML/MIN (H): ICD-10-CM

## 2020-04-01 RX ORDER — FERROUS SULFATE 325(65) MG
325 TABLET ORAL
Qty: 90 TABLET | Refills: 3 | Status: SHIPPED | OUTPATIENT
Start: 2020-04-01 | End: 2020-08-07

## 2020-04-01 RX ORDER — FUROSEMIDE 80 MG
80 TABLET ORAL 2 TIMES DAILY
Qty: 180 TABLET | Refills: 3 | Status: SHIPPED | OUTPATIENT
Start: 2020-04-01 | End: 2020-08-04

## 2020-04-01 RX ORDER — INSULIN GLARGINE 100 [IU]/ML
18 INJECTION, SOLUTION SUBCUTANEOUS AT BEDTIME
Qty: 2 ML | Refills: 3 | Status: SHIPPED | OUTPATIENT
Start: 2020-04-01 | End: 2020-07-10

## 2020-04-01 RX ORDER — INSULIN GLARGINE 100 [IU]/ML
18 INJECTION, SOLUTION SUBCUTANEOUS AT BEDTIME
Qty: 2 ML | Refills: 3 | Status: SHIPPED | OUTPATIENT
Start: 2020-04-01 | End: 2020-04-01

## 2020-04-01 NOTE — PROGRESS NOTES
Nephrology Telephone Visit 4/1/20    Assessment and Plan:     1. CKD5 w/proteinuria- Creat 6.4, eGFR 6 ml/mn, UPCR 6.5 g/gCr (12/19).    - No significant uremic symptoms. She is actually feeling improved since our last visit   - Etiology of CKD felt to be DM, HTN, Vascular dz,    - Remains Inactive on transplant wait list but working with transplant team to become active on the list.     - Has been seen by Dr Martin and will need AVG for HD. Unfortunately surgery has been delayed due to COVID-19 and hospital holding off on non emergent surgeries/procedures. She has a tentative date of 5/7/20. She really has no acute indication to initiate dialysis. Her volume status and electrolytes are fine.    - Discussed with Caroline and Supriya the uncertainty of when patient will begin HD and with what access. Ultimately if she becomes acutely ill she will need a TDC. Also given her mobility issues due to leg amputation she will be unable to transfer using her surgical arm and may require either admission post op or TCU. None of these options are ideal given the current virus situation. The dialysis units themselves are risky at this point, so holding off for as long as possible is the plan. Both Caroline and Supriya are in complete agreement.    - Patient will have labs and phone visit scheduled for 4/17/20. We will make decisions on a day to day basis. Patient and Caroline understand that if there are any acute changes ie: sudden shortness of breath, unexplained significant edema, changes in LOC, that patient should present to ED for admission and initiation of HD   - Does not use NSAIDs   - Blood pressures are acceptable   - DM with excellent control. A1c 5.2 % 2/20   - On statin for CV risk reduction     2. Volume status - Caroline reports patient has no edema.      - Continue Lasix 80 mg bid    - Continue Na restricted diet      3. HTN - Home blood pressures 130-150/. No edema.    Current regimen:    - lasix 80 bid   - Coreg 25 mg  bid   - Amlodipine 5 mg bid    - Continue home monitoring     4. Electrolytes - No acute concerns. K 4.4. Na 142   - Caroline is cooking and being very careful about dietary restrictions     5. Acid base - No acute concerns. Bicarb 23     6. BMD - Ca corrected 10.0, Phos much improved. Down to 7.0 from  8.8 last visit. Albumin 2.8    - Vit D 23,  (1/20)    - Continue Vit D 2000 U every day, but will hold Calcitriol given borderline hypercalcemia   - Continue Renvela 800 mg TID with meals. Admits that she wasn't taking regularly. Now being more careful so will hold off on increasing the dose     7. Anemia - Hgb 10.4   - Iron studies 3/20: Ferritin 561, Fe 80, IS 38.    - Enrolled in anemia management. Given Darbo 40 mcg q 14 dys   - On iron qd   - Colonoscopy 2018, tubular adenomas   - Due for Hgb/Aranesp next week. Will hold off on patient going to lab until 4/17/20 for lab draw.      8. DM2 - Having episodes of hypoglycemia with FBS readings as low as 60's. A1c 5.2 %. On Insulin    - Decrease Basaglar to 18 unit(s) HS ( from 22 unit(s) HS)     9. Depression - Controlled on Zoloft 50 mg every day.       10. Disposition - Next phone visit will be 4/17/20. Patient will have labs prior.     Assessment and plan was discussed with patient and she voiced her understanding and agreement.     Reason for Visit:  CKD5/HTN f/u     HPI:  Ms Herr is a 71 yo female with CKD5 and nephrotic range proteinuria, DM2, HTN, present today for routine CKD f/u. Last seen by me on 3/3/20. Decision was made at the last visit to have access placed and begin search for HD unit. Unfortunately with development of world wide COVID-19 pandemic all non emergent surgeries/procedures have been cancelled. She has a tentative surgery date of 5/7/20 but this date is fluid.   Baseline creat now in the 6 range       Interval Hx:   No hospital admissions     ROS:   Spoke with daughter Caroline and patient  Patient no longer going to day program due to  shelter in place order. Subsequently she is eating more healthy.   Appetite is good  Energy level better than our last visit  No LE edema  Denies new dyspnea. She does not chronic DEVRIES  Home blood pressures 130-150  Has been having hypoglycemia episodes  Voiding w/o problem    Chronic Health Problems:     CKD5  Proteinuria  AKA, left (10/18)  T2DM  Vit D def  HTN  HLD  Anxiety/depression  Vitiligo  Non proliferative diabetic retinopathy  BCC  JONE  Anemia of CKD  Prior tobacco abuse     Personal Hx:   Lives in lower level of [facility], daughter Caroline upper level. NS. Attends day program at [facility] [place] Monday through Thursday    Family Hx:   Family History   Problem Relation Age of Onset     Diabetes Mother      Hypertension Mother      Heart Disease Mother          of congestive heart failure     Eye Disorder Mother      Arthritis Mother      Obesity Mother      Heart Disease Father          from CHF     Cerebrovascular Disease Father      Arthritis Father      Musculoskeletal Disorder Other         has MS     Thyroid Disease Other      Eye Disorder Other         cataracts     Cancer Other         throat/liver     Skin Cancer No family hx of      Melanoma No family hx of      Glaucoma No family hx of      Macular Degeneration No family hx of      Anesthesia Reaction No family hx of      Deep Vein Thrombosis No family hx of          Allergies:  Allergies   Allergen Reactions     Penicillins Rash     Unasyn Rash     No evidence SJS, but very uncomfortable and precipitated multiple provider visits. Would not use penicillins again if other options available.        Medications:  Current Outpatient Medications   Medication Sig     apremilast (OTEZLA) 30 MG tablet Take 1 tablet (30 mg) by mouth daily     ferrous sulfate (FEROSUL) 325 (65 Fe) MG tablet Take 1 tablet (325 mg) by mouth daily (with breakfast) Absorbed best on an empty stomach. If stomach upset occurs, can take with meals.     furosemide (LASIX) 80 MG tablet  "Take 1 tablet (80 mg) by mouth 2 times daily     insulin glargine (BASAGLAR KWIKPEN) 100 UNIT/ML pen Inject 18 Units Subcutaneous At Bedtime     acetaminophen (TYLENOL) 325 MG tablet Take 2 tablets (650 mg) by mouth every 4 hours as needed for mild pain     albuterol (PROAIR HFA/PROVENTIL HFA/VENTOLIN HFA) 108 (90 Base) MCG/ACT inhaler Inhale 2 puffs into the lungs every 6 hours as needed for shortness of breath / dyspnea or wheezing     amLODIPine (NORVASC) 5 MG tablet Take 1 tablet (5 mg) by mouth 2 times daily     atorvastatin (LIPITOR) 20 MG tablet Take 1 tablet (20 mg) by mouth every evening     blood glucose (NO BRAND SPECIFIED) lancets standard Use to test blood sugar 3 to 4  times daily or as directed.     blood glucose (NO BRAND SPECIFIED) test strip Use to test blood sugar 3 to 4 times daily or as directed.     blood glucose (ONETOUCH ULTRA) test strip TEST YOUR BLOOD SUGAR 3-4 TIMES PER DAY.     blood glucose monitoring (NO BRAND SPECIFIED) meter device kit Use to test blood sugar 3 to 4 times daily or as directed.     calcipotriene (DOVONOX) 0.005 % external ointment Use M-F in areas of psoriasis     carvedilol (COREG) 25 MG tablet Take 1 tablet (25 mg) by mouth 2 times daily (with meals)     Continuous Blood Gluc  (FREESTYLE [place] READER) XX BELKYS 1 each See Admin Instructions Use per 's instructions to monitor glucose continuously.     Continuous Blood Gluc Sensor (FREESTYLE [place] 14 DAY SENSOR) XX MISC 1 each every 14 days     insulin aspart (NOVOLOG FLEXPEN) 100 UNIT/ML pen INJECT 4 UNITS SUBCUTANEOUSLY WITH BREAKFAST, LUNCH AND DINNER.     insulin pen needle (ULTICARE MINI) 31G X 6 MM Use daily or as directed.     order for DME Compression socks - knee  15-20 mmHg  Open toed.  Use daily.     order for DME Equipment being ordered: mattress overlay for hospital bed  Wt. 192#  Height 5'5\"  99 months/Lifetime     order for DME 1 SAD light     order for DME 1 wheelchair     sertraline " (ZOLOFT) 50 MG tablet Take 1 tablet (50 mg) by mouth daily (Patient taking differently: Take 50 mg by mouth At Bedtime )     sevelamer carbonate, RENVELA, (RENVELA) 0.8 GM PACK Packet Take 1 packet (0.8 g) by mouth 3 times daily (with meals)     triamcinolone (KENALOG) 0.1 % external cream Apply to sites of dermatitis- the abdomen, armpits, groin as needed.     vitamin D3 (CHOLECALCIFEROL) 2000 units (50 mcg) tablet Take 1 tablet (2,000 Units) by mouth daily (Patient taking differently: Take 2,000 Units by mouth every evening )     Current Facility-Administered Medications   Medication     bevacizumab (AVASTIN) intravitreal inj 1.25 mg      Vitals:  No vitals obtained    Exam:  No exam performed    LABS:   CMP  Recent Labs   Lab Test 03/27/20  1225 02/28/20  1156 02/14/20  1041 01/17/20  1204    141 144 146*   POTASSIUM 4.4 5.1 5.2 5.3   CHLORIDE 112* 111* 113* 114*   CO2 23 19* 22 23   ANIONGAP 8 12 10 8   * 97 143* 127*   BUN 94* 107* 106* 96*   CR 6.45* 6.12* 5.76* 5.35*   GFRESTIMATED 6* 6* 7* 7*   GFRESTBLACK 7* 7* 8* 9*   JODY 9.1 8.9 8.7 8.4*     Recent Labs   Lab Test 01/17/20  1204 07/31/18  1148 07/24/18  0900 07/17/18  0830 06/24/18  0623 06/23/18  2347 03/29/18  1410 11/13/17  0715   BILITOTAL  --   --   --   --  0.2 0.2 0.2 0.3   ALKPHOS  --   --   --   --  49 56 56 70   ALT 14  --   --   --  17 13 15 37   AST 6 24 24 12 16 12 14 30     CBC  Recent Labs   Lab Test 03/27/20  1225 03/13/20  1022 02/28/20  1156 02/14/20  1041  07/09/19  1513  02/15/19  1459 01/18/19  1457   HGB 10.4* 9.8* 10.2* 9.8*   < > 9.3*   < > 10.3* 10.2*   WBC  --   --  6.1  --   --  6.3  --  5.5 5.4   RBC  --   --  3.48*  --   --  3.07*  --  3.40* 3.47*   HCT 33.9* 31.6* 33.0* 32.1*   < > 29.7*  --  31.6* 32.4*   MCV  --   --  95  --   --  97  --  93 93   MCH  --   --  29.3  --   --  30.3  --  30.3 29.4   MCHC  --   --  30.9*  --   --  31.3*  --  32.6 31.5   RDW  --   --  13.9  --   --  12.9  --  12.5 12.4   PLT  --    --  197  --   --  202  --  232 222    < > = values in this interval not displayed.     URINE STUDIES  Recent Labs   Lab Test 03/29/18  1448 01/25/18  0233 11/02/17  0602 10/26/16  1220  12/05/12  1541   COLOR Straw Light Yellow Light Yellow Yellow   < > Yellow   APPEARANCE Clear Clear Clear Slightly Cloudy   < > Clear   URINEGLC 50* Negative 300* >500*   < > 100*   URINEBILI Negative Negative Negative Negative   < > Negative   URINEKETONE Negative Negative Negative Negative   < > Negative   SG 1.008 1.007 1.010 1.014   < > 1.025   UBLD Negative Negative Negative Negative   < > Small*   URINEPH 5.0 6.5 7.5* 6.0   < > 6.0   PROTEIN >499* 100* >600* >500*   < > 100*   UROBILINOGEN  --   --   --   --   --  0.2   NITRITE Negative Negative Negative Negative   < > Negative   LEUKEST Negative Negative Small* Small*   < > Negative   RBCU 1 0 1 5*   < >  --    WBCU 2 1 44* 54*   < >  --     < > = values in this interval not displayed.     Recent Labs   Lab Test 12/17/19  1013 05/24/19  1420 12/07/18  1417 11/01/18  1533   UTPG 6.51* 6.28* 5.60* 6.71*     PTH  Recent Labs   Lab Test 01/17/20  1204 12/17/19  1010 01/18/19  1457   PTHI 383* 350* 126*     IRON STUDIES  Recent Labs   Lab Test 03/13/20  1022 02/14/20  1041 01/31/20  1205   IRON 80 63 52   * 207* 210*   IRONSAT 38 30 25   ELENA 561* 494* 549*       Silva Guerrero, APRN, CNP

## 2020-04-01 NOTE — PROGRESS NOTES
Forms received from: [facility]     Forms were CMN for [employer] sara     Emailed to provider for signature 4/1/20    Emailed to [facility] 4/6/20

## 2020-04-17 ENCOUNTER — ALLIED HEALTH/NURSE VISIT (OUTPATIENT)
Dept: TRANSPLANT | Facility: CLINIC | Age: 70
End: 2020-04-17
Payer: MEDICARE

## 2020-04-17 ENCOUNTER — VIRTUAL VISIT (OUTPATIENT)
Dept: NEPHROLOGY | Facility: CLINIC | Age: 70
End: 2020-04-17
Payer: MEDICARE

## 2020-04-17 DIAGNOSIS — N18.5 CKD (CHRONIC KIDNEY DISEASE) STAGE 5, GFR LESS THAN 15 ML/MIN (H): ICD-10-CM

## 2020-04-17 DIAGNOSIS — N18.5 CKD (CHRONIC KIDNEY DISEASE) STAGE 5, GFR LESS THAN 15 ML/MIN (H): Primary | ICD-10-CM

## 2020-04-17 DIAGNOSIS — D63.1 ANEMIA IN STAGE 5 CHRONIC KIDNEY DISEASE, NOT ON CHRONIC DIALYSIS (H): Primary | ICD-10-CM

## 2020-04-17 DIAGNOSIS — N18.5 ANEMIA OF CHRONIC RENAL FAILURE, STAGE 5 (H): ICD-10-CM

## 2020-04-17 DIAGNOSIS — N18.5 ANEMIA IN STAGE 5 CHRONIC KIDNEY DISEASE, NOT ON CHRONIC DIALYSIS (H): Primary | ICD-10-CM

## 2020-04-17 DIAGNOSIS — D63.1 ANEMIA OF CHRONIC RENAL FAILURE, STAGE 5 (H): ICD-10-CM

## 2020-04-17 LAB
ALBUMIN SERPL-MCNC: 2.7 G/DL (ref 3.4–5)
ANION GAP SERPL CALCULATED.3IONS-SCNC: 9 MMOL/L (ref 3–14)
BUN SERPL-MCNC: 85 MG/DL (ref 7–30)
CALCIUM SERPL-MCNC: 8.8 MG/DL (ref 8.5–10.1)
CHLORIDE SERPL-SCNC: 111 MMOL/L (ref 94–109)
CO2 SERPL-SCNC: 23 MMOL/L (ref 20–32)
CREAT SERPL-MCNC: 5.84 MG/DL (ref 0.52–1.04)
FERRITIN SERPL-MCNC: 579 NG/ML (ref 8–252)
GFR SERPL CREATININE-BSD FRML MDRD: 7 ML/MIN/{1.73_M2}
GLUCOSE SERPL-MCNC: 148 MG/DL (ref 70–99)
HCT VFR BLD AUTO: 31.7 % (ref 35–47)
HGB BLD-MCNC: 10 G/DL (ref 11.7–15.7)
IRON SATN MFR SERPL: 29 % (ref 15–46)
IRON SERPL-MCNC: 63 UG/DL (ref 35–180)
PHOSPHATE SERPL-MCNC: 6 MG/DL (ref 2.5–4.5)
POTASSIUM SERPL-SCNC: 4.4 MMOL/L (ref 3.4–5.3)
SODIUM SERPL-SCNC: 142 MMOL/L (ref 133–144)
TIBC SERPL-MCNC: 216 UG/DL (ref 240–430)

## 2020-04-17 PROCEDURE — 85014 HEMATOCRIT: CPT

## 2020-04-17 PROCEDURE — 83540 ASSAY OF IRON: CPT

## 2020-04-17 PROCEDURE — 82728 ASSAY OF FERRITIN: CPT

## 2020-04-17 PROCEDURE — 85018 HEMOGLOBIN: CPT

## 2020-04-17 PROCEDURE — 80069 RENAL FUNCTION PANEL: CPT

## 2020-04-17 PROCEDURE — 36415 COLL VENOUS BLD VENIPUNCTURE: CPT

## 2020-04-17 PROCEDURE — 83550 IRON BINDING TEST: CPT

## 2020-04-17 NOTE — PROGRESS NOTES
Nephrology Telephone Visit 4/17/20    Assessment and Plan:     1. CKD5 w/proteinuria- Stable. Creat 5.8, eGFR 7 ml/mn, UPCR 6.5 g/gCr (12/19).    - No significant uremic symptoms. She continues to feel well.    - Etiology of CKD felt to be DM, HTN, Vascular dz,    - Remains Inactive on transplant wait list but working with transplant team to become active on the list.     - Has been seen by Dr Martin and will need AVG for HD. Unfortunately surgery has been delayed due to COVID-19 and hospital holding off on non emergent surgeries/procedures. She has a tentative date of 5/7/20. She really has no acute indication to initiate dialysis. Her volume status and electrolytes are fine.    - Discussed with Caroline and Supriya the uncertainty of when patient will begin HD and with what access. Ultimately if she becomes acutely ill she will need a TDC. Also given her mobility issues due to leg amputation she will be unable to transfer using her surgical arm. Given COVID don't want to risk going to a TCU, so Caroline will obtain a slide board and practice this week with Supriya. If she can use effectively and surgery not postponed will go forward with AVG on 5/7. If she can't use the slide board then we will postpone the surgery and initiate on TDC when she becomes more symptomatic. Both Caroline and Supriya are in complete agreement. I'll contact Caroline next Friday for an update on how it is going with the slide board.    - Patient will have labs and pre op on 5/4/20.    - We will make decisions on a day to day basis. Patient and Caroline understand that if there are any acute changes ie: sudden shortness of breath, unexplained significant edema, changes in LOC, that patient should present to ED for admission and initiation of HD   - Does not use NSAIDs   - Blood pressures are acceptable   - DM with excellent control. A1c 5.2 % 2/20   - On statin for CV risk reduction     2. Volume status - Caroline reports patient has no edema.      - Continue  Lasix 80 mg bid    - Continue Na restricted diet      3. HTN - Home blood pressures 130-160/. No edema.  Generally takes her blood pressures upon awakening prior to AM meds  Current regimen:    - lasix 80 bid   - Coreg 25 mg bid   - Amlodipine 5 mg bid    - Check blood pressures later in the day   - Continue home monitoring     4. Electrolytes - No acute concerns. K 4.4. Na 142   - Caroline is cooking and being very careful about dietary restrictions     5. Acid base - No acute concerns. Bicarb 23     6. BMD - Ca corrected 9.8, Phos much improved. Down to 6.0 from 7.0 last visit. Albumin 2.7   - Vit D 23,  (1/20)    - Continue Vit D 2000 U every day, but will hold Calcitriol given borderline hypercalcemia   - Continue Renvela 800 mg TID with meals. Admits that she wasn't taking regularly. Now being more careful so will hold off on increasing the dose     7. Anemia - Hgb 10.0   - Iron studies 4/20: Ferritin 579, Fe 63, IS 29.    - Enrolled in anemia management. Given Darbo 40 mcg q 14 dys   - On iron qd   - Colonoscopy 2018, tubular adenomas   - Next lab draw 5/4/20     8. DM2 - Resolution of hypoglycemia episodes with reduction in Lantus. A1c 5.2 %. On Insulin    - Continue Basaglar 18 unit(s) HS      9. Depression - Controlled on Zoloft 50 mg every day.       10. Disposition - RTC one month Patient will have labs prior.     Assessment and plan was discussed with patient and she voiced her understanding and agreement.     Reason for Visit:  CKD5/HTN f/u     HPI:  Ms Herr is a 71 yo female with CKD5 and nephrotic range proteinuria, DM2, HTN, present today for routine CKD f/u. Last seen by me on 3/3/20. Decision was made at the last visit to have access placed and begin search for HD unit. Unfortunately with development of world wide COVID-19 pandemic all non emergent surgeries/procedures have been cancelled. She has a tentative surgery date of 5/7/20 but this date is fluid.   Baseline creat now in the 6 range        Interval Hx:   No hospital admissions     ROS:   Spoke with daughter Caroline and patient  Patient no longer going to day program due to shelter in place order. Subsequently she is eating more healthy.   Appetite is good  Energy level better than our last visit  No LE edema  Denies new dyspnea. She does not chronic DEVRIES  Home blood pressures 130-150  Has been having hypoglycemia episodes  Voiding w/o problem    Chronic Health Problems:     CKD5  Proteinuria  AKA, left (10/18)  T2DM  Vit D def  HTN  HLD  Anxiety/depression  Vitiligo  Non proliferative diabetic retinopathy  BCC  JONE  Anemia of CKD  Prior tobacco abuse     Personal Hx:   Lives in lower level of [facility], daughter Caroline upper level. NS. Attends day program at [facility] Monday through Thursday    Family Hx:   Family History   Problem Relation Age of Onset     Diabetes Mother      Hypertension Mother      Heart Disease Mother          of congestive heart failure     Eye Disorder Mother      Arthritis Mother      Obesity Mother      Heart Disease Father          from CHF     Cerebrovascular Disease Father      Arthritis Father      Musculoskeletal Disorder Other         has MS     Thyroid Disease Other      Eye Disorder Other         cataracts     Cancer Other         throat/liver     Skin Cancer No family hx of      Melanoma No family hx of      Glaucoma No family hx of      Macular Degeneration No family hx of      Anesthesia Reaction No family hx of      Deep Vein Thrombosis No family hx of          Allergies:  Allergies   Allergen Reactions     Penicillins Rash     Unasyn Rash     No evidence SJS, but very uncomfortable and precipitated multiple provider visits. Would not use penicillins again if other options available.        Medications:  Current Outpatient Medications   Medication Sig     acetaminophen (TYLENOL) 325 MG tablet Take 2 tablets (650 mg) by mouth every 4 hours as needed for mild pain     albuterol (PROAIR HFA/PROVENTIL HFA/VENTOLIN  HFA) 108 (90 Base) MCG/ACT inhaler Inhale 2 puffs into the lungs every 6 hours as needed for shortness of breath / dyspnea or wheezing     amLODIPine (NORVASC) 5 MG tablet Take 1 tablet (5 mg) by mouth 2 times daily     apremilast (OTEZLA) 30 MG tablet Take 1 tablet (30 mg) by mouth daily     atorvastatin (LIPITOR) 20 MG tablet Take 1 tablet (20 mg) by mouth every evening     blood glucose (NO BRAND SPECIFIED) lancets standard Use to test blood sugar 3 to 4  times daily or as directed.     blood glucose (NO BRAND SPECIFIED) test strip Use to test blood sugar 3 to 4 times daily or as directed.     blood glucose (ONETOUCH ULTRA) test strip TEST YOUR BLOOD SUGAR 3-4 TIMES PER DAY.     blood glucose monitoring (NO BRAND SPECIFIED) meter device kit Use to test blood sugar 3 to 4 times daily or as directed.     calcipotriene (DOVONOX) 0.005 % external ointment Use M-F in areas of psoriasis     carvedilol (COREG) 25 MG tablet Take 1 tablet (25 mg) by mouth 2 times daily (with meals)     Continuous Blood Gluc  (FREESTYLE [place] READER) XX BELKYS 1 each See Admin Instructions Use per 's instructions to monitor glucose continuously.     Continuous Blood Gluc Sensor (FREESTYLE [place] 14 DAY SENSOR) XX MISC 1 each every 14 days     ferrous sulfate (FEROSUL) 325 (65 Fe) MG tablet Take 1 tablet (325 mg) by mouth daily (with breakfast) Absorbed best on an empty stomach. If stomach upset occurs, can take with meals.     furosemide (LASIX) 80 MG tablet Take 1 tablet (80 mg) by mouth 2 times daily     insulin aspart (NOVOLOG FLEXPEN) 100 UNIT/ML pen INJECT 4 UNITS SUBCUTANEOUSLY WITH BREAKFAST, LUNCH AND DINNER.     insulin glargine (BASAGLAR KWIKPEN) 100 UNIT/ML pen Inject 18 Units Subcutaneous At Bedtime     insulin pen needle (ULTICARE MINI) 31G X 6 MM Use daily or as directed.     order for DME Compression socks - knee  15-20 mmHg  Open toed.  Use daily.     order for DME Equipment being ordered: mattress overlay  "for hospital bed  Wt. 192#  Height 5'5\"  99 months/Lifetime     order for DME 1 SAD light     order for DME 1 wheelchair     sertraline (ZOLOFT) 50 MG tablet Take 1 tablet (50 mg) by mouth daily (Patient taking differently: Take 50 mg by mouth At Bedtime )     sevelamer carbonate, RENVELA, (RENVELA) 0.8 GM PACK Packet Take 1 packet (0.8 g) by mouth 3 times daily (with meals)     triamcinolone (KENALOG) 0.1 % external cream Apply to sites of dermatitis- the abdomen, armpits, groin as needed.     vitamin D3 (CHOLECALCIFEROL) 2000 units (50 mcg) tablet Take 1 tablet (2,000 Units) by mouth daily (Patient taking differently: Take 2,000 Units by mouth every evening )     Current Facility-Administered Medications   Medication     bevacizumab (AVASTIN) intravitreal inj 1.25 mg      Vitals:  No vitals obtained    Exam:  No exam performed    LABS:   CMP  Recent Labs   Lab Test 04/17/20  1118 03/27/20  1225 02/28/20  1156 02/14/20  1041    142 141 144   POTASSIUM 4.4 4.4 5.1 5.2   CHLORIDE 111* 112* 111* 113*   CO2 23 23 19* 22   ANIONGAP 9 8 12 10   * 100* 97 143*   BUN 85* 94* 107* 106*   CR 5.84* 6.45* 6.12* 5.76*   GFRESTIMATED 7* 6* 6* 7*   GFRESTBLACK 8* 7* 7* 8*   JODY 8.8 9.1 8.9 8.7     Recent Labs   Lab Test 01/17/20  1204 07/31/18  1148 07/24/18  0900 07/17/18  0830 06/24/18  0623 06/23/18  2347 03/29/18  1410 11/13/17  0715   BILITOTAL  --   --   --   --  0.2 0.2 0.2 0.3   ALKPHOS  --   --   --   --  49 56 56 70   ALT 14  --   --   --  17 13 15 37   AST 6 24 24 12 16 12 14 30     CBC  Recent Labs   Lab Test 04/17/20  1118 03/27/20  1225 03/13/20  1022 02/28/20  1156  07/09/19  1513  02/15/19  1459 01/18/19  1457   HGB 10.0* 10.4* 9.8* 10.2*   < > 9.3*   < > 10.3* 10.2*   WBC  --   --   --  6.1  --  6.3  --  5.5 5.4   RBC  --   --   --  3.48*  --  3.07*  --  3.40* 3.47*   HCT 31.7* 33.9* 31.6* 33.0*   < > 29.7*  --  31.6* 32.4*   MCV  --   --   --  95  --  97  --  93 93   MCH  --   --   --  29.3  --  " 30.3  --  30.3 29.4   MCHC  --   --   --  30.9*  --  31.3*  --  32.6 31.5   RDW  --   --   --  13.9  --  12.9  --  12.5 12.4   PLT  --   --   --  197  --  202  --  232 222    < > = values in this interval not displayed.     URINE STUDIES  Recent Labs   Lab Test 03/29/18  1448 01/25/18  0233 11/02/17  0602 10/26/16  1220  12/05/12  1541   COLOR Straw Light Yellow Light Yellow Yellow   < > Yellow   APPEARANCE Clear Clear Clear Slightly Cloudy   < > Clear   URINEGLC 50* Negative 300* >500*   < > 100*   URINEBILI Negative Negative Negative Negative   < > Negative   URINEKETONE Negative Negative Negative Negative   < > Negative   SG 1.008 1.007 1.010 1.014   < > 1.025   UBLD Negative Negative Negative Negative   < > Small*   URINEPH 5.0 6.5 7.5* 6.0   < > 6.0   PROTEIN >499* 100* >600* >500*   < > 100*   UROBILINOGEN  --   --   --   --   --  0.2   NITRITE Negative Negative Negative Negative   < > Negative   LEUKEST Negative Negative Small* Small*   < > Negative   RBCU 1 0 1 5*   < >  --    WBCU 2 1 44* 54*   < >  --     < > = values in this interval not displayed.     Recent Labs   Lab Test 12/17/19  1013 05/24/19  1420 12/07/18  1417 11/01/18  1533   UTPG 6.51* 6.28* 5.60* 6.71*     PTH  Recent Labs   Lab Test 01/17/20  1204 12/17/19  1010 01/18/19  1457   PTHI 383* 350* 126*     IRON STUDIES  Recent Labs   Lab Test 04/17/20  1118 03/13/20  1022 02/14/20  1041   IRON 63 80 63   * 210* 207*   IRONSAT 29 38 30   ELENA 579* 561* 494*     This telephone visit will be conducted via a call between you and your physician/provider. We have found that certain health care needs can be provided without the need for a physical exam.  This service lets us provide the care you need with a short phone conversation.  If a prescription is necessary we can send it directly to your pharmacy.  If lab work is needed we can place an order for that and you can then stop by our lab to have the test done at a later time.    Patient has  given verbal consent for Telephone visit?  Yes    Phone call duration:  30  minutes    Silva Guerrero, APRN, CNP

## 2020-04-21 ENCOUNTER — TELEPHONE (OUTPATIENT)
Dept: ORTHOPEDICS | Facility: CLINIC | Age: 70
End: 2020-04-21

## 2020-04-21 ENCOUNTER — TELEPHONE (OUTPATIENT)
Dept: PHARMACY | Facility: CLINIC | Age: 70
End: 2020-04-21

## 2020-04-21 NOTE — TELEPHONE ENCOUNTER
Anemia Management Note  SUBJECTIVE/OBJECTIVE:  Referred by Ethel Guerrero NP on 2020  Primary Diagnosis: Anemia in Chronic Kidney Disease (N18.5, D63.1)     Secondary Diagnosis:  Chronic Kidney Disease, Stage 5 (N18.5)  Hgb goal range:  9-10   Epo/Darbo: Aranesp 40mcg every 14 days for Hgb <10. In Clinic  Iron regimen:  Ferrous Sulfate daily. (19)  Labs : 2020  Recent KELLIE use, transfusion, IV iron: Unknown  RX/TX plans : 10/06/2020  No history of stroke and blood clots.  Hx of HTN, CHF and Basal Cell Carcinoma ()        Contact:            Ok to leave message regarding Medical, Billing and Scheduling information per consent to communicate dated 10/19/2015                              OK to speak with Caroline Sandoval (daughter) and Caroline Baird (sister) regarding Medical, Billing and Scheduling information per consent to communicate dated 10/19/2015    Anemia Latest Ref Rng & Units 2020 2020 2020 2020 3/13/2020 3/27/2020 2020   KELLIE Dose - 40mcg 40mcg 40mcg - 40mcg - -   Hemoglobin 11.7 - 15.7 g/dL 9.5(L) 9.9(L) 9.8(L) 10.2(L) 9.8(L) 10.4(L) 10.0(L)   TSAT 15 - 46 % 29 25 30 - 38 - 29   Ferritin 8 - 252 ng/mL 471(H) 549(H) 494(H) - 561(H) - 579(H)     BP Readings from Last 3 Encounters:   20 137/73   20 (!) 150/74   20 (!) 157/72     Wt Readings from Last 2 Encounters:   19 95.3 kg (210 lb)   19 95.7 kg (211 lb)           ASSESSMENT:  Hgb:Above goal - recommend hold dose  TSat: not at goal (>30%) but ferritin >500ng/mL.  IV iron not indicated at this time per anemia protocol. Ferritin: At goal (>100ng/mL)    PLAN:  Hold Aranesp and RTC for hgb then aranesp if needed in 2 week(s)    Orders needed to be renewed (for next follow-up date) in EPIC: None    Iron labs due:  5/15/2020    Plan discussed with:  No call made. No therapy needed  Plan provided by:  mingo    NEXT FOLLOW-UP DATE:  2020    Leslie Rivas RN   Anemia  Services  [address] [address]   [email or web address]   Office : [phone]  Fax: [phone]

## 2020-04-30 ENCOUNTER — VIRTUAL VISIT (OUTPATIENT)
Dept: EDUCATION SERVICES | Facility: CLINIC | Age: 70
End: 2020-04-30
Payer: MEDICARE

## 2020-04-30 DIAGNOSIS — E11.22 TYPE 2 DIABETES MELLITUS WITH DIABETIC CHRONIC KIDNEY DISEASE, UNSPECIFIED CKD STAGE, UNSPECIFIED WHETHER LONG TERM INSULIN USE (H): Primary | ICD-10-CM

## 2020-04-30 DIAGNOSIS — Z45.2 ENCOUNTER FOR ADJUSTMENT AND MANAGEMENT OF VASCULAR ACCESS DEVICE: ICD-10-CM

## 2020-04-30 DIAGNOSIS — N18.5 CHRONIC KIDNEY DISEASE, STAGE 5, KIDNEY FAILURE (H): Primary | ICD-10-CM

## 2020-04-30 DIAGNOSIS — Z11.59 ENCOUNTER FOR SCREENING FOR OTHER VIRAL DISEASES: Primary | ICD-10-CM

## 2020-04-30 NOTE — PROGRESS NOTES
"Supriya Herr is a 70 year old female who is being evaluated via a billable telephone visit.      The patient has been notified of following:     \"This telephone visit will be conducted via a call between you and your physician/provider. We have found that certain health care needs can be provided without the need for a physical exam.  This service lets us provide the care you need with a short phone conversation.  If a prescription is necessary we can send it directly to your pharmacy.  If lab work is needed we can place an order for that and you can then stop by our lab to have the test done at a later time.    Telephone visits are billed at different rates depending on your insurance coverage. During this emergency period, for some insurers they may be billed the same as an in-person visit.  Please reach out to your insurance provider with any questions.    If during the course of the call the physician/provider feels a telephone visit is not appropriate, you will not be charged for this service.\"    Patient has given verbal consent for Telephone visit?  yes    What phone number would you like to be contacted at? Home     How would you like to obtain your AVS? E-mail    Phone call duration: 30 minutes    Tori York RN      DIABETES EDUCATION NOTE:    Supriya Herr presents today for education related to type 2 diabetes.    Patient is being treated with: insulin (Basaglar and Novolog)  She is accompanied by her daughter, Caroline Gray.    This was a telephone visit due to shelter-in-place order related to Covid-19 outbreak.    PATIENT CONCERNS RELATED TO DIABETES SELF MANAGEMENT:   Sensor not working as expected    ASSESSMENT:    Current Diabetes Management per Patient:  Taking diabetes medications? Yes    Monitoring  Patient glucose self monitoring as follows: randomly  BG meter: One Touch Ultra 2  meter  BG results: higher than sensor readings    BG values are: in and out of target  Patient's most recent "   Lab Results   Component Value Date    A1C 6.0 06/22/2018    is meeting goal of <8.0% but she is anemic.      Hypoglycemia:   Patient is at risk of hypoglycemia? yes                          EDUCATION and INSTRUCTION PROVIDED AT THIS VISIT:    Supriya has recently started the [employer] monitoring system and it is reading low.  She is withholding a lot of Novolog because she is low all of the time.  There are wide discrepancies between the sensor and the meter readings.    We had a long discussion about the difference between meter and sensor readings.  She will use the meter readings as the correct one.  This has not eliminated the need for fingersticks in fact, she may be doing more because of the lows.  It is hard to know if this is a bad sensor or if there are other factors such as movement or placement that are leading to all these lows.    Her daughter was e-mailed the instructions to upload the reader.  We will go from there.     Patient-stated goal written and given to Supriya Herr.  Verbalized and demonstrated understanding of instructions.     PLAN:  See patient instructions  AVS printed and given to patient    FOLLOW-UP:    Time spent with patient at today's visit was 30 minutes.      Any diabetes medication dose changes were made via the CDE Protocol and Collaborative Practice Agreement with [facility] and  [facility].  A copy of this encounter was provided to patient's referring provider.

## 2020-05-04 ENCOUNTER — PRE VISIT (OUTPATIENT)
Dept: SURGERY | Facility: CLINIC | Age: 70
End: 2020-05-04

## 2020-05-04 ENCOUNTER — ALLIED HEALTH/NURSE VISIT (OUTPATIENT)
Dept: TRANSPLANT | Facility: CLINIC | Age: 70
End: 2020-05-04
Payer: MEDICARE

## 2020-05-04 ENCOUNTER — OFFICE VISIT (OUTPATIENT)
Dept: SURGERY | Facility: CLINIC | Age: 70
End: 2020-05-04
Payer: MEDICARE

## 2020-05-04 ENCOUNTER — TELEPHONE (OUTPATIENT)
Dept: PHARMACY | Facility: CLINIC | Age: 70
End: 2020-05-04

## 2020-05-04 VITALS
HEIGHT: 66 IN | TEMPERATURE: 98.7 F | WEIGHT: 210 LBS | SYSTOLIC BLOOD PRESSURE: 163 MMHG | RESPIRATION RATE: 19 BRPM | HEART RATE: 66 BPM | BODY MASS INDEX: 33.75 KG/M2 | DIASTOLIC BLOOD PRESSURE: 70 MMHG | OXYGEN SATURATION: 98 %

## 2020-05-04 DIAGNOSIS — N18.5 CKD (CHRONIC KIDNEY DISEASE) STAGE 5, GFR LESS THAN 15 ML/MIN (H): ICD-10-CM

## 2020-05-04 DIAGNOSIS — N18.5 ANEMIA IN STAGE 5 CHRONIC KIDNEY DISEASE, NOT ON CHRONIC DIALYSIS (H): Primary | ICD-10-CM

## 2020-05-04 DIAGNOSIS — Z01.818 PREOP EXAMINATION: Primary | ICD-10-CM

## 2020-05-04 DIAGNOSIS — D63.1 ANEMIA OF CHRONIC RENAL FAILURE, STAGE 5 (H): ICD-10-CM

## 2020-05-04 DIAGNOSIS — N18.5 ANEMIA OF CHRONIC RENAL FAILURE, STAGE 5 (H): ICD-10-CM

## 2020-05-04 DIAGNOSIS — D63.1 ANEMIA IN STAGE 5 CHRONIC KIDNEY DISEASE, NOT ON CHRONIC DIALYSIS (H): Primary | ICD-10-CM

## 2020-05-04 LAB
ALBUMIN SERPL-MCNC: 2.7 G/DL (ref 3.4–5)
ANION GAP SERPL CALCULATED.3IONS-SCNC: 9 MMOL/L (ref 3–14)
BUN SERPL-MCNC: 90 MG/DL (ref 7–30)
CALCIUM SERPL-MCNC: 8.6 MG/DL (ref 8.5–10.1)
CHLORIDE SERPL-SCNC: 114 MMOL/L (ref 94–109)
CO2 SERPL-SCNC: 20 MMOL/L (ref 20–32)
CREAT SERPL-MCNC: 5.91 MG/DL (ref 0.52–1.04)
GFR SERPL CREATININE-BSD FRML MDRD: 7 ML/MIN/{1.73_M2}
GLUCOSE SERPL-MCNC: 88 MG/DL (ref 70–99)
HCT VFR BLD AUTO: 29.8 % (ref 35–47)
HGB BLD-MCNC: 9.4 G/DL (ref 11.7–15.7)
PHOSPHATE SERPL-MCNC: 6.7 MG/DL (ref 2.5–4.5)
POTASSIUM SERPL-SCNC: 4.6 MMOL/L (ref 3.4–5.3)
SODIUM SERPL-SCNC: 144 MMOL/L (ref 133–144)

## 2020-05-04 PROCEDURE — 85014 HEMATOCRIT: CPT

## 2020-05-04 PROCEDURE — 96372 THER/PROPH/DIAG INJ SC/IM: CPT | Mod: ZF

## 2020-05-04 PROCEDURE — 36415 COLL VENOUS BLD VENIPUNCTURE: CPT

## 2020-05-04 PROCEDURE — 85018 HEMOGLOBIN: CPT

## 2020-05-04 PROCEDURE — 25000128 H RX IP 250 OP 636: Mod: ZF,EC

## 2020-05-04 PROCEDURE — 40000269 ZZH STATISTIC NO CHARGE FACILITY FEE: Mod: ZF

## 2020-05-04 PROCEDURE — 80069 RENAL FUNCTION PANEL: CPT

## 2020-05-04 RX ORDER — DESONIDE 0.5 MG/G
OINTMENT TOPICAL PRN
COMMUNITY
Start: 2020-02-18 | End: 2024-09-11

## 2020-05-04 RX ADMIN — DARBEPOETIN ALFA 40 MCG: 40 INJECTION, SOLUTION INTRAVENOUS; SUBCUTANEOUS at 12:50

## 2020-05-04 ASSESSMENT — PATIENT HEALTH QUESTIONNAIRE - PHQ9: SUM OF ALL RESPONSES TO PHQ QUESTIONS 1-9: 0

## 2020-05-04 ASSESSMENT — MIFFLIN-ST. JEOR: SCORE: 1484.3

## 2020-05-04 ASSESSMENT — PAIN SCALES - GENERAL: PAINLEVEL: NO PAIN (0)

## 2020-05-04 NOTE — PATIENT INSTRUCTIONS
Preparing for Your Surgery      Name:  Supriya Herr   MRN:  [id number]   :  1950   Today's Date:  2020     Arriving for surgery:  Surgery date:  2020  Arrival time:  5:00 am  Due to the COVID 19 crisis, we are trying to keep our patients safe from others who might have respiratory illnesses so the hospital is implementing a no visitor policy.  Please come to:       [facility], [place] [address] [place] [address]  [id number]    - ? parking is available in front of the hospital    -    Please proceed to the Surgery Lounge on the 3rd floor. [phone]?  - ?If you are in need of directions, wheelchair or escort please stop at the Information Desk in the lobby.     What can I eat or drink?  -  You may have solid food or milk products until 8 hours prior to your surgery.(20 at 11 pm)  -  You may have water, apple juice or 7up/Sprite until 2 hours prior to your surgery.(until 5 am arrival time)    Which medicines can I take?         Hold Aspirin, vitamins and supplements one week prior to surgery.       Hold Ibuprofen for 24 hours and/or Naproxen for 48 hours prior to surgery.        Continue to hold Apremilast (otezla) as directed until after surgery     -  Do NOT take these medications in the morning, the day of surgery:  Regular (short acting) insulin   Ferrous sulfate (Ferosul)  Sevelamer carbonate (Renvela)           Please take 80% of usual pm glargine insulin the evening before surgery  (14 units)      -  Please take these medications the day of surgery:  Amlodipine   Furosemide   Carvedilol  Vitamin D  Acetaminophen if needed  Inhaler as usual and bring to hospital       How do I prepare myself?  -  Take two showers: one the night before surgery; and one the morning of surgery.         Use Scrubcare or Hibiclens to wash from neck down, leave soap on your skin for up to one minute.       Do not get soap in your eyes or ears.  You may use your own  shampoo and conditioner; no other hair products.   -  Do NOT use lotion, powder, deodorant, or antiperspirant the day of your surgery.  -  Do NOT wear any makeup, fingernail polish or jewelry.  - Do not bring your own medications to the hospital, except for inhalers and eye   drops.  -  Bring your ID and insurance card.      -If you are scheduled to go home the Same Day as surgery you must have a responsible adult as a  and to stay with you overnight the first 24 hours after surgery.     Questions or Concerns:    -Please contact the Pre Admission Nursing Office  at [phone].       -If you have health changes between today and your surgery please call your surgeon.     For questions after surgery please call your surgeons office.

## 2020-05-04 NOTE — PROGRESS NOTES
Frequency: Every 14 days  Most recent or today's HGB: 9.4   Date: 20  Date of lat dose: 3/13/20  HGB associated with last dose given: 9.8    Blood Pressure:163/70    Diagnosis: Anemia in CKD stage V    Ordered by: Silva Guerrero NP  VIS Offered: Yes    Double Checked by: Aly SERVIN    See MAR for administration details    Pt's first name, last name and  verified prior to medication administration, injection given without complications or questions.
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
## 2020-05-04 NOTE — TELEPHONE ENCOUNTER
Anemia Management Note  SUBJECTIVE/OBJECTIVE:  Referred by Ethel Guerrero NP on 2020  Primary Diagnosis: Anemia in Chronic Kidney Disease (N18.5, D63.1)     Secondary Diagnosis:  Chronic Kidney Disease, Stage 5 (N18.5)  Hgb goal range:  9-10   Epo/Darbo: Aranesp 40mcg every 14 days for Hgb <10. In Clinic  Iron regimen:  Ferrous Sulfate daily. (19)  Labs : 2020  Recent KELLIE use, transfusion, IV iron: Unknown  RX/TX plans : 10/06/2020  No history of stroke and blood clots.  Hx of HTN, CHF and Basal Cell Carcinoma ()        Contact:            Ok to leave message regarding Medical, Billing and Scheduling information per consent to communicate dated 10/19/2015                              OK to speak with Caroline Sandoval (daughter) and Caroline Baird (sister) regarding Medical, Billing and Scheduling information per consent to communicate dated 10/19/2015    Anemia Latest Ref Rng & Units 2020 2020 2020 3/13/2020 3/27/2020 2020 2020   KELLIE Dose - 40mcg 40mcg - 40mcg - - 40mcg   Hemoglobin 11.7 - 15.7 g/dL 9.9(L) 9.8(L) 10.2(L) 9.8(L) 10.4(L) 10.0(L) 9.4(L)   TSAT 15 - 46 % 25 30 - 38 - 29 -   Ferritin 8 - 252 ng/mL 549(H) 494(H) - 561(H) - 579(H) -     BP Readings from Last 3 Encounters:   20 (!) 163/70   20 137/73   20 (!) 150/74     Wt Readings from Last 2 Encounters:   20 95.3 kg (210 lb)   19 95.3 kg (210 lb)           ASSESSMENT:  Hgb:Above goal - recommend hold dose  TSat: not at goal (>30%) but ferritin >500ng/mL.  IV iron not indicated at this time per anemia protocol. Ferritin: At goal (>100ng/mL)    PLAN:  Dose with aranesp and RTC for hgb then aranesp if needed in 2 week(s)    Orders needed to be renewed (for next follow-up date) in EPIC: None    Iron labs due:  5/15/2020    Plan discussed with:  No call made  Plan provided by:  mingo    NEXT FOLLOW-UP DATE:  2020    Leslie Rivas RN   Anemia Services  [address] [id number]  [place] [address]   [email or web address]   Office : [phone]  Fax: [phone]

## 2020-05-05 ENCOUNTER — OFFICE VISIT (OUTPATIENT)
Dept: URGENT CARE | Facility: URGENT CARE | Age: 70
End: 2020-05-05
Attending: SURGERY
Payer: MEDICARE

## 2020-05-05 DIAGNOSIS — Z11.59 ENCOUNTER FOR SCREENING FOR OTHER VIRAL DISEASES: ICD-10-CM

## 2020-05-05 PROCEDURE — 99207 ZZC NO BILLABLE SERVICE THIS VISIT: CPT

## 2020-05-05 PROCEDURE — 87635 SARS-COV-2 COVID-19 AMP PRB: CPT | Performed by: SURGERY

## 2020-05-05 PROCEDURE — 99000 SPECIMEN HANDLING OFFICE-LAB: CPT | Performed by: SURGERY

## 2020-05-06 LAB
SARS-COV-2 RNA SPEC QL NAA+PROBE: NOT DETECTED
SPECIMEN SOURCE: NORMAL

## 2020-05-07 ENCOUNTER — ANESTHESIA (OUTPATIENT)
Dept: SURGERY | Facility: CLINIC | Age: 70
End: 2020-05-07
Payer: MEDICARE

## 2020-05-07 ENCOUNTER — DOCUMENTATION ONLY (OUTPATIENT)
Dept: CARE COORDINATION | Facility: CLINIC | Age: 70
End: 2020-05-07

## 2020-05-07 ENCOUNTER — SURGERY (OUTPATIENT)
Age: 70
End: 2020-05-07
Payer: MEDICARE

## 2020-05-07 ENCOUNTER — HOSPITAL ENCOUNTER (OUTPATIENT)
Facility: CLINIC | Age: 70
Setting detail: OBSERVATION
Discharge: HOME | End: 2020-05-08
Attending: SURGERY | Admitting: SURGERY
Payer: MEDICARE

## 2020-05-07 DIAGNOSIS — Z45.2 ENCOUNTER FOR ADJUSTMENT AND MANAGEMENT OF VASCULAR ACCESS DEVICE: ICD-10-CM

## 2020-05-07 DIAGNOSIS — N18.5 CHRONIC KIDNEY DISEASE, STAGE 5, KIDNEY FAILURE (H): ICD-10-CM

## 2020-05-07 DIAGNOSIS — Z99.2 ENCOUNTER REGARDING VASCULAR ACCESS FOR DIALYSIS FOR ESRD (H): ICD-10-CM

## 2020-05-07 DIAGNOSIS — Z11.59 ENCOUNTER FOR SCREENING FOR OTHER VIRAL DISEASES: ICD-10-CM

## 2020-05-07 DIAGNOSIS — N18.5 CKD (CHRONIC KIDNEY DISEASE) STAGE 5, GFR LESS THAN 15 ML/MIN (H): Primary | ICD-10-CM

## 2020-05-07 DIAGNOSIS — N18.6 ENCOUNTER REGARDING VASCULAR ACCESS FOR DIALYSIS FOR ESRD (H): ICD-10-CM

## 2020-05-07 PROBLEM — R11.0 POSTOPERATIVE NAUSEA: Status: ACTIVE | Noted: 2020-05-07

## 2020-05-07 PROBLEM — Z98.890 POSTOPERATIVE NAUSEA: Status: ACTIVE | Noted: 2020-05-07

## 2020-05-07 LAB
APTT PPP: 36 SEC (ref 22–37)
BASOPHILS # BLD AUTO: 0 10E9/L (ref 0–0.2)
BASOPHILS NFR BLD AUTO: 0.5 %
CREAT SERPL-MCNC: 6.19 MG/DL (ref 0.52–1.04)
DIFFERENTIAL METHOD BLD: ABNORMAL
EOSINOPHIL # BLD AUTO: 0.2 10E9/L (ref 0–0.7)
EOSINOPHIL NFR BLD AUTO: 3.1 %
ERYTHROCYTE [DISTWIDTH] IN BLOOD BY AUTOMATED COUNT: 13.5 % (ref 10–15)
GFR SERPL CREATININE-BSD FRML MDRD: 6 ML/MIN/{1.73_M2}
GLUCOSE BLDC GLUCOMTR-MCNC: 117 MG/DL (ref 70–99)
GLUCOSE BLDC GLUCOMTR-MCNC: 118 MG/DL (ref 70–99)
GLUCOSE BLDC GLUCOMTR-MCNC: 124 MG/DL (ref 70–99)
GLUCOSE SERPL-MCNC: 120 MG/DL (ref 70–99)
HCT VFR BLD AUTO: 31 % (ref 35–47)
HGB BLD-MCNC: 9.6 G/DL (ref 11.7–15.7)
IMM GRANULOCYTES # BLD: 0 10E9/L (ref 0–0.4)
IMM GRANULOCYTES NFR BLD: 0.5 %
INR PPP: 1.14 (ref 0.86–1.14)
INTERPRETATION ECG - MUSE: NORMAL
LYMPHOCYTES # BLD AUTO: 1.2 10E9/L (ref 0.8–5.3)
LYMPHOCYTES NFR BLD AUTO: 18.9 %
MCH RBC QN AUTO: 28.7 PG (ref 26.5–33)
MCHC RBC AUTO-ENTMCNC: 31 G/DL (ref 31.5–36.5)
MCV RBC AUTO: 93 FL (ref 78–100)
MONOCYTES # BLD AUTO: 0.6 10E9/L (ref 0–1.3)
MONOCYTES NFR BLD AUTO: 8.4 %
NEUTROPHILS # BLD AUTO: 4.5 10E9/L (ref 1.6–8.3)
NEUTROPHILS NFR BLD AUTO: 68.6 %
NRBC # BLD AUTO: 0 10*3/UL
NRBC BLD AUTO-RTO: 0 /100
PLATELET # BLD AUTO: 183 10E9/L (ref 150–450)
POTASSIUM SERPL-SCNC: 4.6 MMOL/L (ref 3.4–5.3)
RBC # BLD AUTO: 3.34 10E12/L (ref 3.8–5.2)
WBC # BLD AUTO: 6.6 10E9/L (ref 4–11)

## 2020-05-07 PROCEDURE — 85025 COMPLETE CBC W/AUTO DIFF WBC: CPT | Performed by: CLINICAL NURSE SPECIALIST

## 2020-05-07 PROCEDURE — 25000125 ZZHC RX 250: Performed by: NURSE ANESTHETIST, CERTIFIED REGISTERED

## 2020-05-07 PROCEDURE — C1763 CONN TISS, NON-HUMAN: HCPCS | Performed by: SURGERY

## 2020-05-07 PROCEDURE — 25000131 ZZH RX MED GY IP 250 OP 636 PS 637: Mod: GY | Performed by: STUDENT IN AN ORGANIZED HEALTH CARE EDUCATION/TRAINING PROGRAM

## 2020-05-07 PROCEDURE — 96372 THER/PROPH/DIAG INJ SC/IM: CPT | Mod: 59

## 2020-05-07 PROCEDURE — 25800030 ZZH RX IP 258 OP 636: Performed by: ANESTHESIOLOGY

## 2020-05-07 PROCEDURE — 82947 ASSAY GLUCOSE BLOOD QUANT: CPT | Performed by: CLINICAL NURSE SPECIALIST

## 2020-05-07 PROCEDURE — 36415 COLL VENOUS BLD VENIPUNCTURE: CPT | Performed by: CLINICAL NURSE SPECIALIST

## 2020-05-07 PROCEDURE — 93005 ELECTROCARDIOGRAM TRACING: CPT | Mod: 59

## 2020-05-07 PROCEDURE — 25000128 H RX IP 250 OP 636: Performed by: STUDENT IN AN ORGANIZED HEALTH CARE EDUCATION/TRAINING PROGRAM

## 2020-05-07 PROCEDURE — 25000125 ZZHC RX 250: Performed by: ANESTHESIOLOGY

## 2020-05-07 PROCEDURE — 85730 THROMBOPLASTIN TIME PARTIAL: CPT | Performed by: CLINICAL NURSE SPECIALIST

## 2020-05-07 PROCEDURE — 25000566 ZZH SEVOFLURANE, EA 15 MIN: Performed by: SURGERY

## 2020-05-07 PROCEDURE — 71000014 ZZH RECOVERY PHASE 1 LEVEL 2 FIRST HR: Performed by: SURGERY

## 2020-05-07 PROCEDURE — 27210794 ZZH OR GENERAL SUPPLY STERILE: Performed by: SURGERY

## 2020-05-07 PROCEDURE — 93010 ELECTROCARDIOGRAM REPORT: CPT | Mod: 59 | Performed by: INTERNAL MEDICINE

## 2020-05-07 PROCEDURE — 25000128 H RX IP 250 OP 636: Performed by: ANESTHESIOLOGY

## 2020-05-07 PROCEDURE — P9041 ALBUMIN (HUMAN),5%, 50ML: HCPCS | Performed by: NURSE ANESTHETIST, CERTIFIED REGISTERED

## 2020-05-07 PROCEDURE — 37000008 ZZH ANESTHESIA TECHNICAL FEE, 1ST 30 MIN: Performed by: SURGERY

## 2020-05-07 PROCEDURE — 00000146 ZZHCL STATISTIC GLUCOSE BY METER IP

## 2020-05-07 PROCEDURE — G0378 HOSPITAL OBSERVATION PER HR: HCPCS

## 2020-05-07 PROCEDURE — 36000057 ZZH SURGERY LEVEL 3 1ST 30 MIN - UMMC: Performed by: SURGERY

## 2020-05-07 PROCEDURE — 82565 ASSAY OF CREATININE: CPT | Performed by: CLINICAL NURSE SPECIALIST

## 2020-05-07 PROCEDURE — 36000059 ZZH SURGERY LEVEL 3 EA 15 ADDTL MIN UMMC: Performed by: SURGERY

## 2020-05-07 PROCEDURE — 25000132 ZZH RX MED GY IP 250 OP 250 PS 637: Mod: GY | Performed by: STUDENT IN AN ORGANIZED HEALTH CARE EDUCATION/TRAINING PROGRAM

## 2020-05-07 PROCEDURE — 25000128 H RX IP 250 OP 636: Performed by: NURSE ANESTHETIST, CERTIFIED REGISTERED

## 2020-05-07 PROCEDURE — 85610 PROTHROMBIN TIME: CPT | Performed by: CLINICAL NURSE SPECIALIST

## 2020-05-07 PROCEDURE — 71000015 ZZH RECOVERY PHASE 1 LEVEL 2 EA ADDTL HR: Performed by: SURGERY

## 2020-05-07 PROCEDURE — 71000027 ZZH RECOVERY PHASE 2 EACH 15 MINS: Performed by: SURGERY

## 2020-05-07 PROCEDURE — 37000009 ZZH ANESTHESIA TECHNICAL FEE, EACH ADDTL 15 MIN: Performed by: SURGERY

## 2020-05-07 PROCEDURE — 40000171 ZZH STATISTIC PRE-PROCEDURE ASSESSMENT III: Performed by: SURGERY

## 2020-05-07 PROCEDURE — 25000132 ZZH RX MED GY IP 250 OP 250 PS 637: Mod: GY | Performed by: SURGERY

## 2020-05-07 PROCEDURE — 25000125 ZZHC RX 250: Performed by: STUDENT IN AN ORGANIZED HEALTH CARE EDUCATION/TRAINING PROGRAM

## 2020-05-07 PROCEDURE — 84132 ASSAY OF SERUM POTASSIUM: CPT | Performed by: CLINICAL NURSE SPECIALIST

## 2020-05-07 PROCEDURE — 25000128 H RX IP 250 OP 636: Performed by: CLINICAL NURSE SPECIALIST

## 2020-05-07 PROCEDURE — 25800030 ZZH RX IP 258 OP 636: Performed by: NURSE ANESTHETIST, CERTIFIED REGISTERED

## 2020-05-07 DEVICE — GRAFT ARTERY BOVINE CAROTID ARTEGRAFT 7MMX40CM AG840: Type: IMPLANTABLE DEVICE | Site: ARM | Status: FUNCTIONAL

## 2020-05-07 RX ORDER — LIDOCAINE 40 MG/G
CREAM TOPICAL
Status: DISCONTINUED | OUTPATIENT
Start: 2020-05-07 | End: 2020-05-08 | Stop reason: HOSPADM

## 2020-05-07 RX ORDER — BUPIVACAINE HYDROCHLORIDE 5 MG/ML
INJECTION, SOLUTION EPIDURAL; INTRACAUDAL PRN
Status: DISCONTINUED | OUTPATIENT
Start: 2020-05-07 | End: 2020-05-07

## 2020-05-07 RX ORDER — GLYCOPYRROLATE 0.2 MG/ML
INJECTION, SOLUTION INTRAMUSCULAR; INTRAVENOUS PRN
Status: DISCONTINUED | OUTPATIENT
Start: 2020-05-07 | End: 2020-05-07

## 2020-05-07 RX ORDER — CLOPIDOGREL BISULFATE 75 MG/1
75 TABLET ORAL DAILY
Qty: 30 TABLET | Refills: 0 | Status: SHIPPED | OUTPATIENT
Start: 2020-05-07 | End: 2020-05-08

## 2020-05-07 RX ORDER — ONDANSETRON 4 MG/1
4 TABLET, ORALLY DISINTEGRATING ORAL EVERY 30 MIN PRN
Status: DISCONTINUED | OUTPATIENT
Start: 2020-05-07 | End: 2020-05-07 | Stop reason: HOSPADM

## 2020-05-07 RX ORDER — FENTANYL CITRATE 50 UG/ML
25-50 INJECTION, SOLUTION INTRAMUSCULAR; INTRAVENOUS
Status: DISCONTINUED | OUTPATIENT
Start: 2020-05-07 | End: 2020-05-07 | Stop reason: HOSPADM

## 2020-05-07 RX ORDER — VANCOMYCIN HYDROCHLORIDE 1 G/200ML
1000 INJECTION, SOLUTION INTRAVENOUS
Status: COMPLETED | OUTPATIENT
Start: 2020-05-07 | End: 2020-05-07

## 2020-05-07 RX ORDER — OXYCODONE HYDROCHLORIDE 5 MG/1
5-10 TABLET ORAL EVERY 4 HOURS PRN
Qty: 15 TABLET | Refills: 0 | Status: SHIPPED | OUTPATIENT
Start: 2020-05-07 | End: 2020-05-08

## 2020-05-07 RX ORDER — FENTANYL CITRATE 50 UG/ML
INJECTION, SOLUTION INTRAMUSCULAR; INTRAVENOUS PRN
Status: DISCONTINUED | OUTPATIENT
Start: 2020-05-07 | End: 2020-05-07

## 2020-05-07 RX ORDER — NALOXONE HYDROCHLORIDE 0.4 MG/ML
.1-.4 INJECTION, SOLUTION INTRAMUSCULAR; INTRAVENOUS; SUBCUTANEOUS
Status: DISCONTINUED | OUTPATIENT
Start: 2020-05-07 | End: 2020-05-07

## 2020-05-07 RX ORDER — AMLODIPINE BESYLATE 5 MG/1
5 TABLET ORAL 2 TIMES DAILY
Status: DISCONTINUED | OUTPATIENT
Start: 2020-05-07 | End: 2020-05-08 | Stop reason: HOSPADM

## 2020-05-07 RX ORDER — FUROSEMIDE 80 MG
80 TABLET ORAL 2 TIMES DAILY
Status: DISCONTINUED | OUTPATIENT
Start: 2020-05-07 | End: 2020-05-08 | Stop reason: HOSPADM

## 2020-05-07 RX ORDER — ONDANSETRON 4 MG/1
4 TABLET, ORALLY DISINTEGRATING ORAL EVERY 6 HOURS PRN
Status: DISCONTINUED | OUTPATIENT
Start: 2020-05-07 | End: 2020-05-08 | Stop reason: HOSPADM

## 2020-05-07 RX ORDER — ONDANSETRON 2 MG/ML
4 INJECTION INTRAMUSCULAR; INTRAVENOUS EVERY 30 MIN PRN
Status: DISCONTINUED | OUTPATIENT
Start: 2020-05-07 | End: 2020-05-07 | Stop reason: HOSPADM

## 2020-05-07 RX ORDER — FERROUS SULFATE 325(65) MG
325 TABLET ORAL
Status: DISCONTINUED | OUTPATIENT
Start: 2020-05-08 | End: 2020-05-08 | Stop reason: HOSPADM

## 2020-05-07 RX ORDER — PROCHLORPERAZINE 25 MG
12.5 SUPPOSITORY, RECTAL RECTAL EVERY 12 HOURS PRN
Status: DISCONTINUED | OUTPATIENT
Start: 2020-05-07 | End: 2020-05-08 | Stop reason: HOSPADM

## 2020-05-07 RX ORDER — PROPOFOL 10 MG/ML
INJECTION, EMULSION INTRAVENOUS PRN
Status: DISCONTINUED | OUTPATIENT
Start: 2020-05-07 | End: 2020-05-07

## 2020-05-07 RX ORDER — PROCHLORPERAZINE MALEATE 5 MG
5 TABLET ORAL EVERY 6 HOURS PRN
Status: DISCONTINUED | OUTPATIENT
Start: 2020-05-07 | End: 2020-05-08 | Stop reason: HOSPADM

## 2020-05-07 RX ORDER — NEOSTIGMINE METHYLSULFATE 1 MG/ML
VIAL (ML) INJECTION PRN
Status: DISCONTINUED | OUTPATIENT
Start: 2020-05-07 | End: 2020-05-07

## 2020-05-07 RX ORDER — EPHEDRINE SULFATE 50 MG/ML
INJECTION, SOLUTION INTRAMUSCULAR; INTRAVENOUS; SUBCUTANEOUS PRN
Status: DISCONTINUED | OUTPATIENT
Start: 2020-05-07 | End: 2020-05-07

## 2020-05-07 RX ORDER — SODIUM CHLORIDE 9 MG/ML
INJECTION, SOLUTION INTRAVENOUS CONTINUOUS
Status: DISCONTINUED | OUTPATIENT
Start: 2020-05-07 | End: 2020-05-07 | Stop reason: HOSPADM

## 2020-05-07 RX ORDER — LIDOCAINE 40 MG/G
CREAM TOPICAL
Status: DISCONTINUED | OUTPATIENT
Start: 2020-05-07 | End: 2020-05-07 | Stop reason: HOSPADM

## 2020-05-07 RX ORDER — ACETAMINOPHEN 325 MG/1
650 TABLET ORAL EVERY 4 HOURS PRN
Status: DISCONTINUED | OUTPATIENT
Start: 2020-05-07 | End: 2020-05-08 | Stop reason: HOSPADM

## 2020-05-07 RX ORDER — SEVELAMER CARBONATE FOR ORAL SUSPENSION 800 MG/1
0.8 POWDER, FOR SUSPENSION ORAL
Status: DISCONTINUED | OUTPATIENT
Start: 2020-05-08 | End: 2020-05-08 | Stop reason: HOSPADM

## 2020-05-07 RX ORDER — OXYCODONE HYDROCHLORIDE 5 MG/1
5 TABLET ORAL
Status: DISCONTINUED | OUTPATIENT
Start: 2020-05-07 | End: 2020-05-07

## 2020-05-07 RX ORDER — DEXTROSE MONOHYDRATE 25 G/50ML
25-50 INJECTION, SOLUTION INTRAVENOUS
Status: DISCONTINUED | OUTPATIENT
Start: 2020-05-07 | End: 2020-05-08 | Stop reason: HOSPADM

## 2020-05-07 RX ORDER — OXYCODONE HYDROCHLORIDE 5 MG/1
5-10 TABLET ORAL
Status: DISCONTINUED | OUTPATIENT
Start: 2020-05-07 | End: 2020-05-08

## 2020-05-07 RX ORDER — FLUMAZENIL 0.1 MG/ML
0.2 INJECTION, SOLUTION INTRAVENOUS
Status: DISCONTINUED | OUTPATIENT
Start: 2020-05-07 | End: 2020-05-07 | Stop reason: HOSPADM

## 2020-05-07 RX ORDER — FENTANYL CITRATE 50 UG/ML
25-50 INJECTION, SOLUTION INTRAMUSCULAR; INTRAVENOUS EVERY 5 MIN PRN
Status: DISCONTINUED | OUTPATIENT
Start: 2020-05-07 | End: 2020-05-07 | Stop reason: HOSPADM

## 2020-05-07 RX ORDER — CALCIUM CHLORIDE 100 MG/ML
INJECTION INTRAVENOUS; INTRAVENTRICULAR PRN
Status: DISCONTINUED | OUTPATIENT
Start: 2020-05-07 | End: 2020-05-07

## 2020-05-07 RX ORDER — ONDANSETRON 2 MG/ML
4 INJECTION INTRAMUSCULAR; INTRAVENOUS EVERY 6 HOURS PRN
Status: DISCONTINUED | OUTPATIENT
Start: 2020-05-07 | End: 2020-05-08 | Stop reason: HOSPADM

## 2020-05-07 RX ORDER — NICOTINE POLACRILEX 4 MG
15-30 LOZENGE BUCCAL
Status: DISCONTINUED | OUTPATIENT
Start: 2020-05-07 | End: 2020-05-08 | Stop reason: HOSPADM

## 2020-05-07 RX ORDER — ATORVASTATIN CALCIUM 20 MG/1
20 TABLET, FILM COATED ORAL EVERY MORNING
Status: DISCONTINUED | OUTPATIENT
Start: 2020-05-08 | End: 2020-05-08 | Stop reason: HOSPADM

## 2020-05-07 RX ORDER — NALOXONE HYDROCHLORIDE 0.4 MG/ML
.1-.4 INJECTION, SOLUTION INTRAMUSCULAR; INTRAVENOUS; SUBCUTANEOUS
Status: DISCONTINUED | OUTPATIENT
Start: 2020-05-07 | End: 2020-05-08 | Stop reason: HOSPADM

## 2020-05-07 RX ORDER — ONDANSETRON 4 MG/1
4 TABLET, FILM COATED ORAL EVERY 8 HOURS PRN
Qty: 9 TABLET | Refills: 0 | Status: SHIPPED | OUTPATIENT
Start: 2020-05-07 | End: 2020-05-08

## 2020-05-07 RX ORDER — CLOPIDOGREL BISULFATE 75 MG/1
75 TABLET ORAL DAILY
Status: DISCONTINUED | OUTPATIENT
Start: 2020-05-08 | End: 2020-05-08 | Stop reason: HOSPADM

## 2020-05-07 RX ORDER — LIDOCAINE HYDROCHLORIDE 20 MG/ML
INJECTION, SOLUTION INFILTRATION; PERINEURAL PRN
Status: DISCONTINUED | OUTPATIENT
Start: 2020-05-07 | End: 2020-05-07

## 2020-05-07 RX ORDER — ALBUTEROL SULFATE 0.83 MG/ML
2.5 SOLUTION RESPIRATORY (INHALATION) EVERY 6 HOURS PRN
Status: DISCONTINUED | OUTPATIENT
Start: 2020-05-07 | End: 2020-05-08 | Stop reason: HOSPADM

## 2020-05-07 RX ORDER — ONDANSETRON 2 MG/ML
INJECTION INTRAMUSCULAR; INTRAVENOUS PRN
Status: DISCONTINUED | OUTPATIENT
Start: 2020-05-07 | End: 2020-05-07

## 2020-05-07 RX ORDER — HYDROMORPHONE HYDROCHLORIDE 1 MG/ML
.3-.5 INJECTION, SOLUTION INTRAMUSCULAR; INTRAVENOUS; SUBCUTANEOUS EVERY 5 MIN PRN
Status: DISCONTINUED | OUTPATIENT
Start: 2020-05-07 | End: 2020-05-07 | Stop reason: HOSPADM

## 2020-05-07 RX ORDER — HEPARIN SODIUM 1000 [USP'U]/ML
INJECTION, SOLUTION INTRAVENOUS; SUBCUTANEOUS PRN
Status: DISCONTINUED | OUTPATIENT
Start: 2020-05-07 | End: 2020-05-07

## 2020-05-07 RX ORDER — TRIAMCINOLONE ACETONIDE 1 MG/G
CREAM TOPICAL 2 TIMES DAILY
Status: DISCONTINUED | OUTPATIENT
Start: 2020-05-07 | End: 2020-05-08 | Stop reason: HOSPADM

## 2020-05-07 RX ORDER — SODIUM CHLORIDE 9 MG/ML
INJECTION, SOLUTION INTRAVENOUS CONTINUOUS
Status: CANCELLED | OUTPATIENT
Start: 2020-05-07

## 2020-05-07 RX ORDER — CARVEDILOL 25 MG/1
25 TABLET ORAL 2 TIMES DAILY WITH MEALS
Status: DISCONTINUED | OUTPATIENT
Start: 2020-05-07 | End: 2020-05-08 | Stop reason: HOSPADM

## 2020-05-07 RX ORDER — ACETAMINOPHEN 325 MG/1
650 TABLET ORAL
Status: DISCONTINUED | OUTPATIENT
Start: 2020-05-07 | End: 2020-05-07

## 2020-05-07 RX ORDER — ALBUMIN, HUMAN INJ 5% 5 %
SOLUTION INTRAVENOUS CONTINUOUS PRN
Status: DISCONTINUED | OUTPATIENT
Start: 2020-05-07 | End: 2020-05-07

## 2020-05-07 RX ORDER — NALOXONE HYDROCHLORIDE 0.4 MG/ML
.1-.4 INJECTION, SOLUTION INTRAMUSCULAR; INTRAVENOUS; SUBCUTANEOUS
Status: DISCONTINUED | OUTPATIENT
Start: 2020-05-07 | End: 2020-05-07 | Stop reason: HOSPADM

## 2020-05-07 RX ORDER — AMOXICILLIN 250 MG
1-2 CAPSULE ORAL 2 TIMES DAILY
Qty: 30 TABLET | Refills: 0 | Status: SHIPPED | OUTPATIENT
Start: 2020-05-07 | End: 2020-05-08

## 2020-05-07 RX ORDER — VITAMIN B COMPLEX
2000 TABLET ORAL EVERY EVENING
Status: DISCONTINUED | OUTPATIENT
Start: 2020-05-07 | End: 2020-05-08 | Stop reason: HOSPADM

## 2020-05-07 RX ORDER — SODIUM CHLORIDE, SODIUM LACTATE, POTASSIUM CHLORIDE, CALCIUM CHLORIDE 600; 310; 30; 20 MG/100ML; MG/100ML; MG/100ML; MG/100ML
INJECTION, SOLUTION INTRAVENOUS CONTINUOUS
Status: DISCONTINUED | OUTPATIENT
Start: 2020-05-07 | End: 2020-05-07 | Stop reason: HOSPADM

## 2020-05-07 RX ADMIN — MIDAZOLAM 1 MG: 1 INJECTION INTRAMUSCULAR; INTRAVENOUS at 09:28

## 2020-05-07 RX ADMIN — CALCIUM CHLORIDE 500 MG: 100 INJECTION, SOLUTION INTRAVENOUS at 10:23

## 2020-05-07 RX ADMIN — FENTANYL CITRATE 50 MCG: 50 INJECTION, SOLUTION INTRAMUSCULAR; INTRAVENOUS at 12:57

## 2020-05-07 RX ADMIN — FENTANYL CITRATE 50 MCG: 50 INJECTION, SOLUTION INTRAMUSCULAR; INTRAVENOUS at 07:54

## 2020-05-07 RX ADMIN — FENTANYL CITRATE 50 MCG: 50 INJECTION, SOLUTION INTRAMUSCULAR; INTRAVENOUS at 07:03

## 2020-05-07 RX ADMIN — NEOSTIGMINE METHYLSULFATE 4.5 MG: 1 INJECTION, SOLUTION INTRAVENOUS at 12:22

## 2020-05-07 RX ADMIN — PROCHLORPERAZINE EDISYLATE 5 MG: 5 INJECTION INTRAMUSCULAR; INTRAVENOUS at 13:46

## 2020-05-07 RX ADMIN — SODIUM CHLORIDE: 9 INJECTION, SOLUTION INTRAVENOUS at 07:15

## 2020-05-07 RX ADMIN — LIDOCAINE HYDROCHLORIDE 3 ML: 10 INJECTION, SOLUTION EPIDURAL; INFILTRATION; INTRACAUDAL; PERINEURAL at 07:20

## 2020-05-07 RX ADMIN — ACETAMINOPHEN 650 MG: 325 TABLET, FILM COATED ORAL at 17:16

## 2020-05-07 RX ADMIN — ACETAMINOPHEN 650 MG: 325 TABLET, FILM COATED ORAL at 22:53

## 2020-05-07 RX ADMIN — SERTRALINE HYDROCHLORIDE 50 MG: 50 TABLET ORAL at 22:54

## 2020-05-07 RX ADMIN — NOREPINEPHRINE BITARTRATE 6.4 MCG: 1 INJECTION, SOLUTION, CONCENTRATE INTRAVENOUS at 08:34

## 2020-05-07 RX ADMIN — GLYCOPYRROLATE 0.9 MG: 0.2 INJECTION, SOLUTION INTRAMUSCULAR; INTRAVENOUS at 12:22

## 2020-05-07 RX ADMIN — NOREPINEPHRINE BITARTRATE 6.4 MCG: 1 INJECTION, SOLUTION, CONCENTRATE INTRAVENOUS at 08:18

## 2020-05-07 RX ADMIN — INSULIN GLARGINE 18 UNITS: 100 INJECTION, SOLUTION SUBCUTANEOUS at 23:39

## 2020-05-07 RX ADMIN — LIDOCAINE HYDROCHLORIDE 80 MG: 20 INJECTION, SOLUTION INFILTRATION; PERINEURAL at 07:54

## 2020-05-07 RX ADMIN — AMLODIPINE BESYLATE 5 MG: 5 TABLET ORAL at 22:54

## 2020-05-07 RX ADMIN — PROPOFOL 80 MG: 10 INJECTION, EMULSION INTRAVENOUS at 07:54

## 2020-05-07 RX ADMIN — ALBUMIN HUMAN: 0.05 INJECTION, SOLUTION INTRAVENOUS at 08:44

## 2020-05-07 RX ADMIN — VANCOMYCIN HYDROCHLORIDE 1000 MG: 1 INJECTION, SOLUTION INTRAVENOUS at 06:41

## 2020-05-07 RX ADMIN — ROCURONIUM BROMIDE 35 MG: 10 INJECTION INTRAVENOUS at 07:54

## 2020-05-07 RX ADMIN — PROPOFOL 20 MG: 10 INJECTION, EMULSION INTRAVENOUS at 09:45

## 2020-05-07 RX ADMIN — NOREPINEPHRINE BITARTRATE 6.4 MCG: 1 INJECTION, SOLUTION, CONCENTRATE INTRAVENOUS at 09:13

## 2020-05-07 RX ADMIN — MELATONIN 2000 UNITS: at 22:53

## 2020-05-07 RX ADMIN — FUROSEMIDE 80 MG: 80 TABLET ORAL at 22:53

## 2020-05-07 RX ADMIN — BUPIVACAINE HYDROCHLORIDE 30 ML: 5 INJECTION, SOLUTION EPIDURAL; INTRACAUDAL at 07:20

## 2020-05-07 RX ADMIN — CALCIUM CHLORIDE 500 MG: 100 INJECTION, SOLUTION INTRAVENOUS at 10:12

## 2020-05-07 RX ADMIN — CARVEDILOL 25 MG: 25 TABLET, FILM COATED ORAL at 22:54

## 2020-05-07 RX ADMIN — PROPOFOL 10 MG: 10 INJECTION, EMULSION INTRAVENOUS at 10:06

## 2020-05-07 RX ADMIN — NOREPINEPHRINE BITARTRATE 6.4 MCG: 1 INJECTION, SOLUTION, CONCENTRATE INTRAVENOUS at 09:48

## 2020-05-07 RX ADMIN — ONDANSETRON 4 MG: 2 INJECTION INTRAMUSCULAR; INTRAVENOUS at 07:40

## 2020-05-07 RX ADMIN — PROCHLORPERAZINE EDISYLATE 5 MG: 5 INJECTION INTRAMUSCULAR; INTRAVENOUS at 17:23

## 2020-05-07 RX ADMIN — HEPARIN SODIUM 2000 UNITS: 1000 INJECTION INTRAVENOUS; SUBCUTANEOUS at 10:25

## 2020-05-07 RX ADMIN — ONDANSETRON 4 MG: 2 INJECTION INTRAMUSCULAR; INTRAVENOUS at 12:46

## 2020-05-07 RX ADMIN — Medication 10 MG: at 08:09

## 2020-05-07 ASSESSMENT — MIFFLIN-ST. JEOR
SCORE: 1453.75
SCORE: 1434.75

## 2020-05-07 NOTE — PROGRESS NOTES
Please schedule for when patient is out of the hospital. Will need these done as soon as possible. Thank you!

## 2020-05-07 NOTE — OR NURSING
Pre op block done no complication or patient complaints.  Pt instructed to be careful with lt arm which is numb at this time.  Pt unable to lift lt arm but able to wiggle fingers.  Pillow under to protect.

## 2020-05-07 NOTE — ANESTHESIA PROCEDURE NOTES
Peripheral Nerve Block Procedure Note  Staff -   Anesthesiologist:  Lyndon Alvarez MD  Resident/Fellow: Alejandra Sweeney MD    Performed By: with residents  Procedure performed by resident/CRNA in presence of a teaching physician.        Location: Pre-op  Procedure Start/Stop TImes:      5/7/2020 7:10 AM     5/7/2020 7:20 AM    patient identified, IV checked, site marked, risks and benefits discussed, informed consent, monitors and equipment checked, pre-op evaluation, at physician/surgeon's request and post-op pain management      Correct Patient: Yes      Correct Position: Yes      Correct Site: Yes      Correct Procedure: Yes      Correct Laterality:  Yes    Site Marked:  Yes  Procedure details:     Procedure:  Supraclavicular    ASA:  3    Diagnosis:  Perioperative anesthesia and analgesia    Laterality:  Left    Position:  Supine    Sterile Prep: chloraprep      Local skin infiltration:  1% lidocaine    amount (mL):  3    Needle:  Short bevel    Needle gauge:  21    Needle length (mm):  110    Ultrasound: Yes      Ultrasound used to identify targeted nerve, plexus, or vascular structure and placed a needle adjacent to it      Permanent Image entered into patiient's record      Abnormal pain on injection: No      Blood Aspirated: No      Bleeding at site: No      Bolus via:  Needle    Infusion Method:  Single Shot    Complications:  None

## 2020-05-07 NOTE — ANESTHESIA CARE TRANSFER NOTE
Patient: Supriya Herr    Procedure(s):  Left upper arm brachial artery to axillary vein arteriovenous bovine graft creation with intraoperative ultrasound    Diagnosis: Chronic kidney disease, stage 5, kidney failure (H) [N18.5]  Encounter regarding vascular access for dialysis for ESRD (H) [N18.6, Z99.2]  Diagnosis Additional Information: No value filed.    Anesthesia Type:   No value filed.     Note:  Airway :Nasal Cannula  Patient transferred to:PACU  Comments: Spont resp, VSS, report to RNHandoff Report: Identifed the Patient, Identified the Reponsible Provider, Reviewed the pertinent medical history, Discussed the surgical course, Reviewed Intra-OP anesthesia mangement and issues during anesthesia, Set expectations for post-procedure period and Allowed opportunity for questions and acknowledgement of understanding      Vitals: (Last set prior to Anesthesia Care Transfer)    CRNA VITALS  5/7/2020 1202 - 5/7/2020 1238      5/7/2020             NIBP:  120/76    Pulse:  74    Temp:  37.1  C (98.8  F)    SpO2:  99 %    Resp Rate (observed):  16    EKG:  Sinus rhythm                Electronically Signed By: PENELOPE Lacey CRNA  May 7, 2020  12:38 PM

## 2020-05-07 NOTE — OR NURSING
Spoke with Dr. Tinajero regarding daughters concerns and patient not improving nausea, headache and abd pain. Dr. Tinajero will come see patient and decide whether to admit to obs or continue to discharge.

## 2020-05-07 NOTE — DISCHARGE INSTRUCTIONS
[facility]  Same-Day Surgery   Adult Discharge Orders & Instructions     For 24 hours after surgery    1. Get plenty of rest.  A responsible adult must stay with you for at least 24 hours after you leave the hospital.   2. Do not drive or use heavy equipment.  If you have weakness or tingling, don't drive or use heavy equipment until this feeling goes away.  3. Do not drink alcohol.  4. Avoid strenuous or risky activities.  Ask for help when climbing stairs.   5. You may feel lightheaded.  IF so, sit for a few minutes before standing.  Have someone help you get up.   6. If you have nausea (feel sick to your stomach): Drink only clear liquids such as apple juice, ginger ale, broth or 7-Up.  Rest may also help.  Be sure to drink enough fluids.  Move to a regular diet as you feel able.  7. You may have a slight fever. Call the doctor if your fever is over 100 F (37.7 C) (taken under the tongue) or lasts longer than 24 hours.  8. You may have a dry mouth, a sore throat, muscle aches or trouble sleeping.  These should go away after 24 hours.  9. Do not make important or legal decisions.   Call your doctor for any of the followin.  Signs of infection (fever, growing tenderness at the surgery site, a large amount of drainage or bleeding, severe pain, foul-smelling drainage, redness, swelling).    2. It has been over 8 to 10 hours since surgery and you are still not able to urinate (pass water).    3.  Headache for over 24 hours.    To contact a doctor, call:  [ ] Dr Martin's office at [phone] (Monday thru Friday 8:00am to 4:30pm)        [phone] and ask for the resident on call (answered 24 hours a day)      Emergency Department: [facility]: [phone]

## 2020-05-07 NOTE — OR NURSING
Called patients daughter Caroline Gray [phone], Reviewed discharge instructions at length with patients daughter. Questions asked and answered appropriately. Daughter expressed significant safety concerns with having patient at home while she is BKA and unable to utilize her LUE secondary to regional anesthesia. Assured the daughter that I would discuss concerns with treatment team. Patient remains nauseated, with headache and abd discomfort which she relates to not being able to eat. Patient remains in wheelchair.

## 2020-05-07 NOTE — ANESTHESIA POSTPROCEDURE EVALUATION
Anesthesia POST Procedure Evaluation    Patient: Supriya Herr   MRN:     [id number] Gender:   female   Age:    70 year old :      1950        Preoperative Diagnosis: Chronic kidney disease, stage 5, kidney failure (H) [N18.5]  Encounter regarding vascular access for dialysis for ESRD (H) [N18.6, Z99.2]   Procedure(s):  Left upper arm brachial artery to axillary vein arteriovenous bovine graft creation with intraoperative ultrasound   Postop Comments: No value filed.     Anesthesia Type: No value filed.       Disposition: Outpatient   Postop Pain Control: Uneventful            Sign Out: Well controlled pain   PONV: No   Neuro/Psych: Uneventful            Sign Out: Acceptable/Baseline neuro status   Airway/Respiratory: Uneventful            Sign Out: Acceptable/Baseline resp. status   CV/Hemodynamics: Uneventful            Sign Out: Acceptable CV status   Other NRE: NONE   DID A NON-ROUTINE EVENT OCCUR? No         Last Anesthesia Record Vitals:  CRNA VITALS  2020 1202 - 2020 1302      2020             NIBP:  120/76    Pulse:  74    Temp:  37.1  C (98.8  F)    SpO2:  99 %    Resp Rate (observed):  16    EKG:  Sinus rhythm          Last PACU Vitals:  Vitals Value Taken Time   /57 2020  2:30 PM   Temp 37.1  C (98.7  F) 2020  2:10 PM   Pulse 66 2020  2:30 PM   Resp 16 2020  2:10 PM   SpO2 95 % 2020  2:32 PM   Temp src     NIBP 120/76 2020 12:36 PM   Pulse 74 2020 12:36 PM   SpO2 99 % 2020 12:36 PM   Resp     Temp 37.1  C (98.8  F) 2020 12:36 PM   Ht Rate     Temp 2     Vitals shown include unvalidated device data.      Electronically Signed By: Ravin Valdes MD, May 7, 2020, 4:18 PM

## 2020-05-07 NOTE — OR NURSING
4 prescription called into [facility] pharmacy on [facility]. Patient will need to provide physical copy of Oxycodone script.

## 2020-05-07 NOTE — OP NOTE
Transplant Surgery  Operative Note    Date:  5/7/2020  Preop Dx:  Chronic Kidney Disease  Postop Dx: Chronic Kidney Disease  Procedure: Left Upper Extremity Brachial Artery to Axillary Vein Arteriovenous Bovine Graft   Surgeon: Angelita Martin M.D.  ASSISTANT:  AMY Tinajero MD fellow. LACEY Hamlin MD resident.    Anesthesia: General  EBL: 200 ml  Drains: no drain  Specimen: none  Findings: Intraoperative ultrasound demonstrated a 4.4mm brachial artery, 4.2mm axillary vein, cephalic vein in the upper arm was diminutive. Flow rate of 505cc/min after completion of graft, partially pulsatile, even at the most proximal, venous portion, 2+ radial pulse postoperatively  Complications: None.    Indication: The patient has chronic kidney disease with need for hemodialysis access.  After discussing the risks and benefits of surgery and potential complications, the patient provided informed consent.     DETAILS OF PROCEDURE:  The patient was positioned supine, and the left upper extremity was positioned, prepped and draped in the usual sterile fashion. An ultrasound was performed to assess the size, quality, and position of the artery and vein. The patient received preoperative IV antibiotics. An incision was made and extended through the subcutaneous tissues above the antecubital fossa with a second just below the hairline in the axilla. The brachial artery and axillary vein were circumferentially dissected. Counter incisions were made as needed and a Bety-Weck  was used to place a 7mm 6 inner diameter 42cm AVG in the subcutaneous space of the upper arm. The graft was distended with heparinzed saline and seen to have good lay.   The patient was  Heparinized with 2000U. Proximal and distal control of the  axillary vein was obtained first. A generous venotomy was made and the graft was tailored to have an appropriate size and good lay. The anastomosis was completed using running 6-0 prolene and distended with heparinized  saline prior to tying. Hemostasis was obained and we turned attention to the artery. Hemostasis in the antecubital incision  was obtained, an arteriotomy was made. The arterial end of the graft was trimmed and narrowed to 4mm diameter and anastomosed end to side with the brachial artery using 7-0 Prolene.  The clamps were removed in sequence. The graft flow was good- measured as above. The distal radial artery pulse was excellent- unchanged from prior and capillary refill good. Hemostasis was achieved. The wound was closed in layers with absorbable suture and dressed with Dermabond. Counts were correct. Faculty was present for the key portions of the procedure. The patient was then awakened and transferred to PACU in good condition.     I was present during the key portions of the procedure, and I was immediately available for the entire procedure.

## 2020-05-07 NOTE — OR NURSING
Patient c/o abd pain and continuous nausea although sleepy she is arousable. Dr. Tinajero paged to have patient discharged with Zofran.

## 2020-05-07 NOTE — OR NURSING
Dr. Tinajero at bedside with patient, decision made to register to Obs. Patients daughter called and notified. Patient assisted back to cart from wheelchair. At that time patient was incontinent of urine and small amount of feces. Patient cleaned up and resting in cart. Patient medicated with anti-emetics. And encouraged to take ice chips and sips of water.

## 2020-05-08 VITALS
TEMPERATURE: 99.2 F | WEIGHT: 203.26 LBS | HEIGHT: 66 IN | DIASTOLIC BLOOD PRESSURE: 68 MMHG | BODY MASS INDEX: 32.67 KG/M2 | OXYGEN SATURATION: 98 % | RESPIRATION RATE: 18 BRPM | HEART RATE: 59 BPM | SYSTOLIC BLOOD PRESSURE: 164 MMHG

## 2020-05-08 LAB
ANION GAP SERPL CALCULATED.3IONS-SCNC: 10 MMOL/L (ref 3–14)
BASOPHILS # BLD AUTO: 0 10E9/L (ref 0–0.2)
BASOPHILS NFR BLD AUTO: 0.4 %
BUN SERPL-MCNC: 93 MG/DL (ref 7–30)
CALCIUM SERPL-MCNC: 8.2 MG/DL (ref 8.5–10.1)
CHLORIDE SERPL-SCNC: 115 MMOL/L (ref 94–109)
CO2 SERPL-SCNC: 18 MMOL/L (ref 20–32)
CREAT SERPL-MCNC: 6.43 MG/DL (ref 0.52–1.04)
DIFFERENTIAL METHOD BLD: ABNORMAL
EOSINOPHIL # BLD AUTO: 0 10E9/L (ref 0–0.7)
EOSINOPHIL NFR BLD AUTO: 0.4 %
ERYTHROCYTE [DISTWIDTH] IN BLOOD BY AUTOMATED COUNT: 14 % (ref 10–15)
GFR SERPL CREATININE-BSD FRML MDRD: 6 ML/MIN/{1.73_M2}
GLUCOSE BLDC GLUCOMTR-MCNC: 149 MG/DL (ref 70–99)
GLUCOSE BLDC GLUCOMTR-MCNC: 53 MG/DL (ref 70–99)
GLUCOSE BLDC GLUCOMTR-MCNC: 55 MG/DL (ref 70–99)
GLUCOSE BLDC GLUCOMTR-MCNC: 57 MG/DL (ref 70–99)
GLUCOSE BLDC GLUCOMTR-MCNC: 60 MG/DL (ref 70–99)
GLUCOSE BLDC GLUCOMTR-MCNC: 74 MG/DL (ref 70–99)
GLUCOSE BLDC GLUCOMTR-MCNC: 77 MG/DL (ref 70–99)
GLUCOSE BLDC GLUCOMTR-MCNC: 78 MG/DL (ref 70–99)
GLUCOSE SERPL-MCNC: 83 MG/DL (ref 70–99)
HCT VFR BLD AUTO: 24.6 % (ref 35–47)
HGB BLD-MCNC: 7.5 G/DL (ref 11.7–15.7)
HGB BLD-MCNC: 7.6 G/DL (ref 11.7–15.7)
IMM GRANULOCYTES # BLD: 0 10E9/L (ref 0–0.4)
IMM GRANULOCYTES NFR BLD: 0.3 %
LYMPHOCYTES # BLD AUTO: 1.5 10E9/L (ref 0.8–5.3)
LYMPHOCYTES NFR BLD AUTO: 21 %
MAGNESIUM SERPL-MCNC: 2.6 MG/DL (ref 1.6–2.3)
MCH RBC QN AUTO: 29.4 PG (ref 26.5–33)
MCHC RBC AUTO-ENTMCNC: 30.5 G/DL (ref 31.5–36.5)
MCV RBC AUTO: 97 FL (ref 78–100)
MONOCYTES # BLD AUTO: 0.7 10E9/L (ref 0–1.3)
MONOCYTES NFR BLD AUTO: 9.7 %
NEUTROPHILS # BLD AUTO: 4.7 10E9/L (ref 1.6–8.3)
NEUTROPHILS NFR BLD AUTO: 68.2 %
NRBC # BLD AUTO: 0 10*3/UL
NRBC BLD AUTO-RTO: 0 /100
PHOSPHATE SERPL-MCNC: 6.2 MG/DL (ref 2.5–4.5)
PLATELET # BLD AUTO: 172 10E9/L (ref 150–450)
POTASSIUM SERPL-SCNC: 4.9 MMOL/L (ref 3.4–5.3)
RBC # BLD AUTO: 2.55 10E12/L (ref 3.8–5.2)
SODIUM SERPL-SCNC: 142 MMOL/L (ref 133–144)
WBC # BLD AUTO: 6.9 10E9/L (ref 4–11)

## 2020-05-08 PROCEDURE — 36415 COLL VENOUS BLD VENIPUNCTURE: CPT | Performed by: NURSE PRACTITIONER

## 2020-05-08 PROCEDURE — 84100 ASSAY OF PHOSPHORUS: CPT | Performed by: STUDENT IN AN ORGANIZED HEALTH CARE EDUCATION/TRAINING PROGRAM

## 2020-05-08 PROCEDURE — 85025 COMPLETE CBC W/AUTO DIFF WBC: CPT | Performed by: STUDENT IN AN ORGANIZED HEALTH CARE EDUCATION/TRAINING PROGRAM

## 2020-05-08 PROCEDURE — 25000132 ZZH RX MED GY IP 250 OP 250 PS 637: Mod: GY | Performed by: SURGERY

## 2020-05-08 PROCEDURE — 85018 HEMOGLOBIN: CPT | Mod: 91 | Performed by: NURSE PRACTITIONER

## 2020-05-08 PROCEDURE — 00000146 ZZHCL STATISTIC GLUCOSE BY METER IP

## 2020-05-08 PROCEDURE — G0378 HOSPITAL OBSERVATION PER HR: HCPCS

## 2020-05-08 PROCEDURE — 80048 BASIC METABOLIC PNL TOTAL CA: CPT | Performed by: STUDENT IN AN ORGANIZED HEALTH CARE EDUCATION/TRAINING PROGRAM

## 2020-05-08 PROCEDURE — 36415 COLL VENOUS BLD VENIPUNCTURE: CPT | Performed by: STUDENT IN AN ORGANIZED HEALTH CARE EDUCATION/TRAINING PROGRAM

## 2020-05-08 PROCEDURE — 83735 ASSAY OF MAGNESIUM: CPT | Performed by: STUDENT IN AN ORGANIZED HEALTH CARE EDUCATION/TRAINING PROGRAM

## 2020-05-08 PROCEDURE — 25000132 ZZH RX MED GY IP 250 OP 250 PS 637: Mod: GY | Performed by: STUDENT IN AN ORGANIZED HEALTH CARE EDUCATION/TRAINING PROGRAM

## 2020-05-08 RX ORDER — CLOPIDOGREL BISULFATE 75 MG/1
75 TABLET ORAL DAILY
Qty: 30 TABLET | Refills: 0 | Status: SHIPPED | OUTPATIENT
Start: 2020-05-08 | End: 2020-06-11

## 2020-05-08 RX ORDER — ONDANSETRON 4 MG/1
4 TABLET, FILM COATED ORAL EVERY 8 HOURS PRN
Qty: 9 TABLET | Refills: 0 | Status: SHIPPED | OUTPATIENT
Start: 2020-05-08 | End: 2020-06-11

## 2020-05-08 RX ORDER — OXYCODONE HYDROCHLORIDE 5 MG/1
5 TABLET ORAL EVERY 4 HOURS PRN
Status: DISCONTINUED | OUTPATIENT
Start: 2020-05-08 | End: 2020-05-08 | Stop reason: HOSPADM

## 2020-05-08 RX ORDER — AMOXICILLIN 250 MG
1-2 CAPSULE ORAL 2 TIMES DAILY PRN
Qty: 30 TABLET | Refills: 0 | Status: SHIPPED | OUTPATIENT
Start: 2020-05-08 | End: 2020-06-11

## 2020-05-08 RX ADMIN — ATORVASTATIN CALCIUM 20 MG: 20 TABLET, FILM COATED ORAL at 09:11

## 2020-05-08 RX ADMIN — FERROUS SULFATE TAB 325 MG (65 MG ELEMENTAL FE) 325 MG: 325 (65 FE) TAB at 09:11

## 2020-05-08 RX ADMIN — DEXTROSE 15 G: 15 GEL ORAL at 09:20

## 2020-05-08 RX ADMIN — DEXTROSE 15 G: 15 GEL ORAL at 10:20

## 2020-05-08 RX ADMIN — CARVEDILOL 25 MG: 25 TABLET, FILM COATED ORAL at 09:11

## 2020-05-08 RX ADMIN — CLOPIDOGREL BISULFATE 75 MG: 75 TABLET ORAL at 09:11

## 2020-05-08 RX ADMIN — ACETAMINOPHEN 650 MG: 325 TABLET, FILM COATED ORAL at 05:04

## 2020-05-08 RX ADMIN — FUROSEMIDE 80 MG: 80 TABLET ORAL at 09:11

## 2020-05-08 RX ADMIN — DEXTROSE 15 G: 15 GEL ORAL at 10:02

## 2020-05-08 RX ADMIN — DEXTROSE 15 G: 15 GEL ORAL at 08:56

## 2020-05-08 RX ADMIN — SEVELAMER CARBONATE 0.8 G: 800 POWDER, FOR SUSPENSION ORAL at 09:11

## 2020-05-08 RX ADMIN — AMLODIPINE BESYLATE 5 MG: 5 TABLET ORAL at 09:11

## 2020-05-08 RX ADMIN — DEXTROSE 15 G: 15 GEL ORAL at 09:41

## 2020-05-08 NOTE — PROGRESS NOTES
Observation Goals:       - vital signs normal or at patient baseline - yes except hypertensive but pt states this is normal and that she missed her AM HTN meds  -tolerating oral intake to maintain hydration - yes  -adequate pain control on oral analgesics - yes (pain in knee at baseline, denied any other pain)  -returns to baseline functional status - yes     [id number]

## 2020-05-08 NOTE — PLAN OF CARE
DISCHARGE                         5/8/2020  2:20 PM  ----------------------------------------------------------------------------  Discharged to: Home  Via: Automobile  Accompanied by: Family  Discharge Instructions: diet, activity, medications, follow up appointments, when to call the MD, aftercare instructions, and to watchout for s/s of infection.  Prescriptions: To be filled by [facility] discharge pharmacy per pt's request; medication list reviewed & sent with pt  Follow Up Appointments: arranged; information given  Belongings: All sent with pt  IV: out  Telemetry: off  Pt exhibits understanding of above discharge instructions; all questions answered.    Discharge Paperwork: Signed, copied, and sent home with patient.

## 2020-05-08 NOTE — PROGRESS NOTES
Care Coordinator - Discharge Planning    Admission Date/Time:  5/7/2020  Attending MD:  Angelita Martin MD     Data  Chart reviewed, discussed with interdisciplinary team.   Patient was admitted for:   1. Chronic kidney disease, stage 5, kidney failure (H)    2. Encounter regarding vascular access for dialysis for ESRD (H)    3. Chronic kidney disease, stage 5, kidney failure (H)    4. Encounter regarding vascular access for dialysis for ESRD (H)    5. Encounter for adjustment and management of vascular access device    Pt was admitted to observation following a planned AV fistula placement on 5/7/2020.     Assessment   Concerns with insurance coverage for discharge needs: None.  Current Living Situation: Patient states she lives with her daughter, Caroline Gray.  Support System: Supportive and Involved daughter  Services Involved: PCA (Pt states that her daughter is her PCA), DME  Transportation at Discharge: Family or friend will provide  Transportation to Medical Appointments: Daughter, Caroline Gray    Pt states she is discharging to home early this afternoon when her daughter arrives. Pt states no discharge concerns or additional home care needs at this time. Pt has her home wheelchair at bedside.       Plan  Anticipated Discharge Date: 5/8/2020  Anticipated Discharge Plan: Discharge to home with resumption of services  CC will continue to monitor patient's medical condition and progress towards discharge.  Ekaterina Alas RN BSN  6C Unit Care Coordinator  Phone number: [phone]  Pager: [phone]

## 2020-05-08 NOTE — PROGRESS NOTES
Observation Goals:      - vital signs normal or at patient baseline - yes except hypertensive but pt states this is normal and that she missed her AM HTN meds  -tolerating oral intake to maintain hydration - yes  -adequate pain control on oral analgesics - yes (pain in knee at baseline, denied any other pain)  -returns to baseline functional status - yes    [id number]

## 2020-05-08 NOTE — DISCHARGE SUMMARY
[facility], [place]    Discharge Summary  Transplant Surgery    Date of Admission:  5/7/2020  Date of Discharge:  5/8/2020  Discharging Provider: Angelita Martin MD / Reina Alba NP     Discharge Diagnoses   Principal Problem:    Encounter regarding vascular access for dialysis for ESRD (H)  Active Problems:    Anemia    Chronic kidney disease, stage 5, kidney failure (H)    Postoperative nausea      History of Present Illness   Supriya Herr is an 70 year old female with history of CKD stage 5, DM2, HTN, asthma, and left AKA. Underwent a Left Upper Extremity Brachial Artery to Axillary Vein Arteriovenous Bovine Graft on 5/7/2020. Kept overnight due to post-op nausea.     Hospital Course   S/p graft placement: Pulsatile thrill + on day of discharge. Baseline sensation in left hand. Plavix x30 days.    Anemia: Hgb drop 9.6 to 7.6 post-op. No sign of post-op bleeding, but did have some perioperative blood loss. I&O also positive >1.5L. Likely some hemodilution. Recheck Hgb stable morning of discharge. No symptoms.    Postoperative nausea: Resolved morning after discharge.     Significant Results and Procedures   Date:  5/7/2020  Preop Dx:  Chronic Kidney Disease  Postop Dx: Chronic Kidney Disease  Procedure: Left Upper Extremity Brachial Artery to Axillary Vein Arteriovenous Bovine Graft   Surgeon: Angelita Martin M.D.    Code Status   Full    Primary Care Physician   Noam Jackson    Physical Exam   Temp: 98.5  F (36.9  C) Temp src: Oral BP: (!) 163/62 Pulse: 59 Heart Rate: 71 Resp: 18 SpO2: 99 % O2 Device: None (Room air) Oxygen Delivery: 2 LPM  Vitals:    05/07/20 0541 05/07/20 1850   Weight: 90.3 kg (199 lb 1.2 oz) 92.2 kg (203 lb 4.2 oz)     Vital Signs with Ranges  Temp:  [98  F (36.7  C)-99.5  F (37.5  C)] 98.5  F (36.9  C)  Pulse:  [58-73] 59  Heart Rate:  [57-79] 71  Resp:  [12-22] 18  BP: ()/() 163/62  SpO2:  [94 %-100 %] 99 %  I/O last 3 completed  shifts:  In: 2110 [P.O.:960; I.V.:900]  Out: 500 [Urine:300; Blood:200]    Constitutional: NAD  Respiratory:  Room air, unlabored  Cardiovascular: Perfused, graft + pulsatile thrill  GI: Soft  Genitourinary: No conrad  Skin: Warm, pink  Musculoskeletal: Left AKA, left hand warm  Neurologic: A&Ox4    ROS: Minimal pain, baseline sensation in left hand, no nausea    Time Spent on this Encounter   I, PENELOPE Iniguez CNP, personally saw the patient today and spent less than or equal to 30 minutes discharging this patient.    Discharge Disposition   Discharged to home  Condition at discharge: Stable    Consultations This Hospital Stay   None    Discharge Orders      Weight bearing status - As tolerated     No driving or operating machinery     until the day after procedure     No Alcohol    For 24 hours post procedure     Diet Instructions    Resume pre-procedure diet     Shower    No shower for 48 hours post procedure. May shower Postoperative Day (POD)  2     Reason for your hospital stay    AV graft placement     Adult [facility] Follow-up and recommended labs and tests    Follow up with Damaris Molina on May 21 as scheduled    Appointments on [facility] and/or [facility] (with [facility] or [facility] provider or service). Call [phone] if you haven't heard regarding these appointments within 7 days of discharge.     Monitor and record    Don't use left arm until Saturday, then do not lift greater than 20 pounds.     When to contact your care team    Please call with left hand cramping, sensation changes, new swelling, bleeding, or if you do not feel the thrill in your arm at the graft site. Weekdays, please call Damaris Molina [phone]. Afterhours, call the hospital at [phone] and ask for the Kidney Transplant/Dialysis Access Surgery fellow on call.     Discharge Medications   Current Discharge Medication List      START taking these medications    Details   clopidogrel (PLAVIX) 75 MG tablet Take 1 tablet (75  mg) by mouth daily  Qty: 30 tablet, Refills: 0    Associated Diagnoses: Chronic kidney disease, stage 5, kidney failure (H); Encounter regarding vascular access for dialysis for ESRD (H)      ondansetron (ZOFRAN) 4 MG tablet Take 1 tablet (4 mg) by mouth every 8 hours as needed for nausea  Qty: 9 tablet, Refills: 0    Associated Diagnoses: Encounter for adjustment and management of vascular access device      oxyCODONE (ROXICODONE) 5 MG tablet Take 1-2 tablets (5-10 mg) by mouth every 4 hours as needed for moderate to severe pain  Qty: 15 tablet, Refills: 0    Associated Diagnoses: Chronic kidney disease, stage 5, kidney failure (H); Encounter regarding vascular access for dialysis for ESRD (H)      senna-docusate (SENOKOT-S/PERICOLACE) 8.6-50 MG tablet Take 1-2 tablets by mouth 2 times daily  Qty: 30 tablet, Refills: 0    Associated Diagnoses: Chronic kidney disease, stage 5, kidney failure (H); Encounter regarding vascular access for dialysis for ESRD (H)         CONTINUE these medications which have NOT CHANGED    Details   albuterol (PROAIR HFA/PROVENTIL HFA/VENTOLIN HFA) 108 (90 Base) MCG/ACT inhaler Inhale 2 puffs into the lungs every 6 hours as needed for shortness of breath / dyspnea or wheezing  Qty: 3 Inhaler, Refills: 1    Comments: Pharmacy may dispense brand covered by insurance (Proair, or proventil or ventolin or generic albuterol inhaler)  Associated Diagnoses: Cough      atorvastatin (LIPITOR) 20 MG tablet Take 1 tablet (20 mg) by mouth every evening  Qty: 90 tablet, Refills: 2    Comments: DX Code Needed  .  Associated Diagnoses: Hyperlipidemia LDL goal <100      calcipotriene (DOVONOX) 0.005 % external ointment Use M-F in areas of psoriasis  Qty: 90 g, Refills: 3    Comments: Use Monday through Friday, twice daily  Associated Diagnoses: Inverse psoriasis      carvedilol (COREG) 25 MG tablet Take 1 tablet (25 mg) by mouth 2 times daily (with meals)  Qty: 180 tablet, Refills: 3    Associated  Diagnoses: Essential hypertension      desonide (DESOWEN) 0.05 % external ointment Apply topically as needed      ferrous sulfate (FEROSUL) 325 (65 Fe) MG tablet Take 1 tablet (325 mg) by mouth daily (with breakfast) Absorbed best on an empty stomach. If stomach upset occurs, can take with meals.  Qty: 90 tablet, Refills: 3    Associated Diagnoses: Anemia due to stage 5 chronic kidney disease, 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 (H)      furosemide (LASIX) 80 MG tablet Take 1 tablet (80 mg) by mouth 2 times daily  Qty: 180 tablet, Refills: 3    Associated Diagnoses: CKD (chronic kidney disease) stage 5, GFR less than 15 ml/min (H); Anemia due to stage 5 chronic kidney disease, 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 (H)      insulin aspart (NOVOLOG FLEXPEN) 100 UNIT/ML pen INJECT 4 UNITS SUBCUTANEOUSLY WITH BREAKFAST, LUNCH AND DINNER.  Qty: 15 mL, Refills: 3    Comments: DX Code Needed  .  Associated Diagnoses: Type 2 diabetes mellitus with hyperglycemia, with long-term current use of insulin (H)      insulin glargine (BASAGLAR KWIKPEN) 100 UNIT/ML pen Inject 18 Units Subcutaneous At Bedtime  Qty: 2 mL, Refills: 3    Comments: If Basaglar is not covered by insurance, may substitute Lantus at same dose and frequency.    Associated Diagnoses: Type 2 diabetes mellitus with diabetic neuropathy, with long-term current use of insulin (H)      sertraline (ZOLOFT) 50 MG tablet Take 1 tablet (50 mg) by mouth daily  Qty: 90 tablet, Refills: 3    Associated Diagnoses: NICOLE (generalized anxiety disorder)      sevelamer carbonate, RENVELA, (RENVELA) 0.8 GM PACK Packet Take 1 packet (0.8 g) by mouth 3 times daily (with meals)  Qty: 90 packet, Refills: 3    Associated Diagnoses: Hyperphosphatemia      triamcinolone (KENALOG) 0.1 % external cream Apply to sites of dermatitis- the abdomen, armpits, groin as needed.  Qty: 30 g, Refills: 0    Associated Diagnoses: Dermatitis      acetaminophen (TYLENOL) 325 MG tablet Take 2 tablets (650 mg) by mouth every 4  hours as needed for mild pain  Qty: 100 tablet, Refills: 0    Associated Diagnoses: Diabetic ulcer of toe of right foot associated with type 2 diabetes mellitus, with other ulcer severity (H)      amLODIPine (NORVASC) 5 MG tablet Take 1 tablet (5 mg) by mouth 2 times daily  Qty: 180 tablet, Refills: 3    Associated Diagnoses: Hypertension goal BP (blood pressure) < 140/90      apremilast (OTEZLA) 30 MG tablet Take 1 tablet (30 mg) by mouth daily  Qty: 90 tablet, Refills: 3    Associated Diagnoses: Inverse psoriasis      blood glucose (NO BRAND SPECIFIED) lancets standard Use to test blood sugar 3 to 4  times daily or as directed.  Qty: 400 each, Refills: 3    Associated Diagnoses: Type 2 diabetes mellitus with hyperglycemia, with long-term current use of insulin (H)      !! blood glucose (NO BRAND SPECIFIED) test strip Use to test blood sugar 3 to 4 times daily or as directed.  Qty: 400 strip, Refills: 3    Associated Diagnoses: Type 2 diabetes mellitus with hyperglycemia, with long-term current use of insulin (H)      !! blood glucose (ONETOUCH ULTRA) test strip TEST YOUR BLOOD SUGAR 3-4 TIMES PER DAY.  Qty: 400 strip, Refills: 1    Associated Diagnoses: Type 2 diabetes mellitus with diabetic nephropathy, with long-term current use of insulin (H)      blood glucose monitoring (NO BRAND SPECIFIED) meter device kit Use to test blood sugar 3 to 4 times daily or as directed.  Qty: 1 kit, Refills: 0    Associated Diagnoses: Type 2 diabetes mellitus with hyperglycemia, with long-term current use of insulin (H)      Continuous Blood Gluc  (FREESTYLE PHOENIX READER) XX BELKYS 1 each See Admin Instructions Use per 's instructions to monitor glucose continuously.  Qty: 1 Device, Refills: 0    Associated Diagnoses: Type 2 diabetes mellitus with diabetic neuropathy, with long-term current use of insulin (H)      Continuous Blood Gluc Sensor (FREESTYLE PHOENIX 14 DAY SENSOR) XX MISC 1 each every 14 days  Qty: 6  "each, Refills: 3    Associated Diagnoses: Type 2 diabetes mellitus with diabetic neuropathy, with long-term current use of insulin (H)      insulin pen needle (ULTICARE MINI) 31G X 6 MM Use daily or as directed.  Qty: 100 each, Refills: 1    Associated Diagnoses: Type 2 diabetes, HbA1c goal < 7% (H)      !! order for DME Compression socks - knee  15-20 mmHg  Open toed.  Use daily.  Qty: 1 Units, Refills: 0    Associated Diagnoses: PVD (peripheral vascular disease) (H); Edema leg      !! order for DME Equipment being ordered: mattress overlay for hospital bed  Wt. 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 order for DME 1 SAD light  Qty: 1 Device, Refills: 1    Associated Diagnoses: Seasonal affective disorder (H)      !! order for DME 1 wheelchair  Qty: 1 Device, Refills: 0    Associated Diagnoses: Traumatic amputation of lower extremity above knee, unspecified laterality, subsequent encounter; CKD (chronic kidney disease) stage 3, GFR 30-59 ml/min (H); Type 2 diabetes mellitus with stage 3 chronic kidney disease, without long-term current use of insulin (H)      vitamin D3 (CHOLECALCIFEROL) 2000 units (50 mcg) tablet Take 1 tablet (2,000 Units) by mouth daily  Qty: 100 tablet, Refills: 3    Associated Diagnoses: Vitamin D deficiency       !! - Potential duplicate medications found. Please discuss with provider.        Allergies   Allergies   Allergen Reactions     Penicillins Rash     Unasyn Rash     No evidence SJS, but very uncomfortable and precipitated multiple provider visits. Would not use penicillins again if other options available.      Data   Most Recent 3 CBC's:  Recent Labs   Lab Test 05/08/20  0756 " 05/08/20  0540 05/07/20  0607  02/28/20  1156   WBC  --  6.9 6.6  --  6.1   HGB 7.6* 7.5* 9.6*   < > 10.2*   MCV  --  97 93  --  95   PLT  --  172 183  --  197    < > = values in this interval not displayed.     Most Recent 3 BMP's:  Recent Labs   Lab Test 05/08/20  0540 05/07/20  0607 05/04/20  1211 04/17/20  1118     --  144 142   POTASSIUM 4.9 4.6 4.6 4.4   CHLORIDE 115*  --  114* 111*   CO2 18*  --  20 23   BUN 93*  --  90* 85*   CR 6.43* 6.19* 5.91* 5.84*   ANIONGAP 10  --  9 9   JODY 8.2*  --  8.6 8.8   GLC 83 120* 88 148*   I have reviewed history, examined patient and discussed plan with the fellow/resident/OLVIN.    I concur with the findings in this note.    Time spent on discharge activities: 45 minutes.

## 2020-05-11 ENCOUNTER — PATIENT OUTREACH (OUTPATIENT)
Dept: CARE COORDINATION | Facility: CLINIC | Age: 70
End: 2020-05-11

## 2020-05-12 ENCOUNTER — VIRTUAL VISIT (OUTPATIENT)
Dept: NEPHROLOGY | Facility: CLINIC | Age: 70
End: 2020-05-12
Payer: MEDICARE

## 2020-05-12 DIAGNOSIS — N18.5 ANEMIA DUE TO STAGE 5 CHRONIC KIDNEY DISEASE, NOT ON CHRONIC DIALYSIS (H): ICD-10-CM

## 2020-05-12 DIAGNOSIS — I10 ESSENTIAL HYPERTENSION: ICD-10-CM

## 2020-05-12 DIAGNOSIS — N18.5 CKD (CHRONIC KIDNEY DISEASE) STAGE 5, GFR LESS THAN 15 ML/MIN (H): Primary | ICD-10-CM

## 2020-05-12 DIAGNOSIS — D63.1 ANEMIA DUE TO STAGE 5 CHRONIC KIDNEY DISEASE, NOT ON CHRONIC DIALYSIS (H): ICD-10-CM

## 2020-05-12 DIAGNOSIS — E55.9 VITAMIN D DEFICIENCY: ICD-10-CM

## 2020-05-13 ENCOUNTER — CARE COORDINATION (OUTPATIENT)
Dept: NEPHROLOGY | Facility: CLINIC | Age: 70
End: 2020-05-13

## 2020-05-13 NOTE — PROGRESS NOTES
Nephrology Telephone Visit 5/12/20    Assessment and Plan:     1. CKD5 w/proteinuria- Creat 6.4, eGFR 6 ml/mn, UPCR 6.5 g/gCr (12/19).    - No significant uremic symptoms but we are going to initiate HD in order to prevent complications.    - Etiology of CKD felt to be DM, HTN, Vascular dz,    - Remains Inactive on transplant wait list but working with transplant team to become active on the list.     - AVG was created on 5/7/20 by Dr Martin. Has f/u with him on 5/20/20.    - Will begin OP HD search with intention of beginning HD by the end of this month. Caroline and Supriya would like to stay as close to home as possible and request a MWF afternoon schedule if possible. [facility] is beginning the admission process.     - Does not use NSAIDs   - Blood pressures are acceptable   - DM with excellent control. A1c 5.2 % 2/20   - On statin for CV risk reduction     2. Volume status - Caroline reports patient has no edema.      - Continue Lasix 80 mg bid    - Continue Na restricted diet      3. HTN - Home blood pressures 130-140/. No edema.  Generally takes her blood pressures upon awakening prior to AM meds  Current regimen:    - lasix 80 bid   - Coreg 25 mg bid   - Amlodipine 5 mg bid    - Continue home monitoring     4. Electrolytes - No acute concerns. K 4.9. Na 142   - Caroline is cooking and being very careful about dietary restrictions     5. Acid base - Bicarb down to 18   - Will be corrected on HD     6. BMD - Ca corrected 9.8, Phos much improved. Down to 6.2 from 7.0 last visit. Albumin 2.7   - Vit D 23,  (1/20)    - Continue Vit D 2000 U every day, but will hold Calcitriol given borderline hypercalcemia   - Continue Renvela 800 mg TID with meals. Admits that she wasn't taking regularly. Now being more careful so will hold off on increasing the dose. Will also see some improvement once she starts HD     7. Anemia - Hgb down to 7.6 post op from 10.0   - Iron studies 4/20: Ferritin 579, Fe 63, IS 29.    - Enrolled in  anemia management. Given Darbo 40 mcg q 14 dys. Will be switching to Epo 5000 U IV q run   - On iron every day but will switch to Venofer 50 mg IV q week   - Colonoscopy 2018, tubular adenomas     8. DM2 - Resolution of hypoglycemia episodes with reduction in Lantus. A1c 5.2 %. On Insulin    - Continue Basaglar 18 unit(s) HS      9. Depression - Controlled on Zoloft 50 mg every day.       10. Disposition - No further clinic f/u required. Complete HD initiation.     Assessment and plan was discussed with patient and she voiced her understanding and agreement.     Reason for Visit:  CKD5/HTN f/u     HPI:  Ms Herr is a 69 yo female with CKD5 and nephrotic range proteinuria, DM2, HTN, present today for routine CKD f/u. Our Last telephone visit was 20. In the interim Supriya had her AVG surgery and we are now finalizing plans for initiation of OP HD  Creat now moving through the 6 range       Interval Hx:   Admission -20 for AVG surgery and post op nausea     ROS:   Spoke with daughter Caroline and patient  Had AVG surgery on 20. The AVF site is healing, Supriya is able to transfer  Patient no longer going to day program due to shelter in place order. Subsequently she is eating more healthy.   Appetite is good. Had post op nausea but this has resolved  Energy level is good  No LE edema  Denies new dyspnea. She does not chronic DEVRIES  Home blood pressures 130-140  Voiding w/o problem    Chronic Health Problems:     CKD5  Proteinuria  AKA, left (10/18)  T2DM  Vit D def  HTN  HLD  Anxiety/depression  Vitiligo  Non proliferative diabetic retinopathy  BCC  JONE  Anemia of CKD  Prior tobacco abuse     Personal Hx:   L[facility] in lower [facility], daughter Caroline upper level. NS. Attends day program at [facility] Monday through Thursday    Family Hx:   Family History   Problem Relation Age of Onset     Diabetes Mother      Hypertension Mother      Heart Disease Mother          of congestive heart failure     Eye  Disorder Mother      Arthritis Mother      Obesity Mother      Heart Disease Father          from CHF     Cerebrovascular Disease Father      Arthritis Father      Musculoskeletal Disorder Other         has MS     Thyroid Disease Other      Eye Disorder Other         cataracts     Cancer Other         throat/liver     Skin Cancer No family hx of      Melanoma No family hx of      Glaucoma No family hx of      Macular Degeneration No family hx of      Anesthesia Reaction No family hx of      Deep Vein Thrombosis No family hx of          Allergies:  Allergies   Allergen Reactions     Penicillins Rash     Unasyn Rash     No evidence SJS, but very uncomfortable and precipitated multiple provider visits. Would not use penicillins again if other options available.        Medications:  Current Outpatient Medications   Medication Sig     acetaminophen (TYLENOL) 325 MG tablet Take 2 tablets (650 mg) by mouth every 4 hours as needed for mild pain (Patient taking differently: Take 650 mg by mouth every 4 hours as needed for mild pain (Pt has not taken in past 2 weeks 20) )     albuterol (PROAIR HFA/PROVENTIL HFA/VENTOLIN HFA) 108 (90 Base) MCG/ACT inhaler Inhale 2 puffs into the lungs every 6 hours as needed for shortness of breath / dyspnea or wheezing (Patient taking differently: Inhale 2 puffs into the lungs every 6 hours as needed for shortness of breath / dyspnea or wheezing (Pt has not used in past 2 weeks 20) )     amLODIPine (NORVASC) 5 MG tablet Take 1 tablet (5 mg) by mouth 2 times daily     apremilast (OTEZLA) 30 MG tablet Take 1 tablet (30 mg) by mouth daily (Patient taking differently: Take 30 mg by mouth every morning )     atorvastatin (LIPITOR) 20 MG tablet Take 1 tablet (20 mg) by mouth every evening (Patient taking differently: Take 20 mg by mouth every morning )     blood glucose (NO BRAND SPECIFIED) lancets standard Use to test blood sugar 3 to 4  times daily or as directed.     blood glucose  "(NO BRAND SPECIFIED) test strip Use to test blood sugar 3 to 4 times daily or as directed.     blood glucose (ONETOUCH ULTRA) test strip TEST YOUR BLOOD SUGAR 3-4 TIMES PER DAY.     blood glucose monitoring (NO BRAND SPECIFIED) meter device kit Use to test blood sugar 3 to 4 times daily or as directed.     calcipotriene (DOVONOX) 0.005 % external ointment Use M-F in areas of psoriasis     carvedilol (COREG) 25 MG tablet Take 1 tablet (25 mg) by mouth 2 times daily (with meals)     clopidogrel (PLAVIX) 75 MG tablet Take 1 tablet (75 mg) by mouth daily     Continuous Blood Gluc  (FREESTYLE PHOENIX READER) XX BELKYS 1 each See Admin Instructions Use per 's instructions to monitor glucose continuously.     Continuous Blood Gluc Sensor (FREESTYLE PHOENIX 14 DAY SENSOR) XX MISC 1 each every 14 days     desonide (DESOWEN) 0.05 % external ointment Apply topically as needed     ferrous sulfate (FEROSUL) 325 (65 Fe) MG tablet Take 1 tablet (325 mg) by mouth daily (with breakfast) Absorbed best on an empty stomach. If stomach upset occurs, can take with meals.     furosemide (LASIX) 80 MG tablet Take 1 tablet (80 mg) by mouth 2 times daily     insulin aspart (NOVOLOG FLEXPEN) 100 UNIT/ML pen INJECT 4 UNITS SUBCUTANEOUSLY WITH BREAKFAST, LUNCH AND DINNER.     insulin glargine (BASAGLAR KWIKPEN) 100 UNIT/ML pen Inject 18 Units Subcutaneous At Bedtime     insulin pen needle (ULTICARE MINI) 31G X 6 MM Use daily or as directed.     ondansetron (ZOFRAN) 4 MG tablet Take 1 tablet (4 mg) by mouth every 8 hours as needed for nausea     order for DME Compression socks - knee  15-20 mmHg  Open toed.  Use daily.     order for DME Equipment being ordered: mattress overlay for hospital bed  Wt. 192#  Height 5'5\"  99 months/Lifetime     order for DME 1 SAD light     order for DME 1 wheelchair     senna-docusate (SENOKOT-S/PERICOLACE) 8.6-50 MG tablet Take 1-2 tablets by mouth 2 times daily as needed for constipation     " sertraline (ZOLOFT) 50 MG tablet Take 1 tablet (50 mg) by mouth daily (Patient taking differently: Take 50 mg by mouth At Bedtime )     sevelamer carbonate, RENVELA, (RENVELA) 0.8 GM PACK Packet Take 1 packet (0.8 g) by mouth 3 times daily (with meals)     triamcinolone (KENALOG) 0.1 % external cream Apply to sites of dermatitis- the abdomen, armpits, groin as needed.     vitamin D3 (CHOLECALCIFEROL) 2000 units (50 mcg) tablet Take 1 tablet (2,000 Units) by mouth daily (Patient taking differently: Take 2,000 Units by mouth every evening )     Current Facility-Administered Medications   Medication     bevacizumab (AVASTIN) intravitreal inj 1.25 mg      Vitals:  No vitals obtained    Exam:  No exam performed    LABS:   CMP  Recent Labs   Lab Test 05/08/20  0540 05/07/20  0607 05/04/20  1211 04/17/20  1118 03/27/20  1225     --  144 142 142   POTASSIUM 4.9 4.6 4.6 4.4 4.4   CHLORIDE 115*  --  114* 111* 112*   CO2 18*  --  20 23 23   ANIONGAP 10  --  9 9 8   GLC 83 120* 88 148* 100*   BUN 93*  --  90* 85* 94*   CR 6.43* 6.19* 5.91* 5.84* 6.45*   GFRESTIMATED 6* 6* 7* 7* 6*   GFRESTBLACK 7* 7* 8* 8* 7*   JODY 8.2*  --  8.6 8.8 9.1     Recent Labs   Lab Test 01/17/20  1204 07/31/18  1148 07/24/18  0900 07/17/18  0830 06/24/18  0623 06/23/18  2347 03/29/18  1410 11/13/17  0715   BILITOTAL  --   --   --   --  0.2 0.2 0.2 0.3   ALKPHOS  --   --   --   --  49 56 56 70   ALT 14  --   --   --  17 13 15 37   AST 6 24 24 12 16 12 14 30     CBC  Recent Labs   Lab Test 05/08/20  0756 05/08/20  0540 05/07/20  0607 05/04/20  1211 04/17/20  1118  02/28/20  1156  07/09/19  1513   HGB 7.6* 7.5* 9.6* 9.4* 10.0*   < > 10.2*   < > 9.3*   WBC  --  6.9 6.6  --   --   --  6.1  --  6.3   RBC  --  2.55* 3.34*  --   --   --  3.48*  --  3.07*   HCT  --  24.6* 31.0* 29.8* 31.7*   < > 33.0*   < > 29.7*   MCV  --  97 93  --   --   --  95  --  97   MCH  --  29.4 28.7  --   --   --  29.3  --  30.3   MCHC  --  30.5* 31.0*  --   --   --  30.9*  --   31.3*   RDW  --  14.0 13.5  --   --   --  13.9  --  12.9   PLT  --  172 183  --   --   --  197  --  202    < > = values in this interval not displayed.     URINE STUDIES  Recent Labs   Lab Test 03/29/18  1448 01/25/18  0233 11/02/17  0602 10/26/16  1220  12/05/12  1541   COLOR Straw Light Yellow Light Yellow Yellow   < > Yellow   APPEARANCE Clear Clear Clear Slightly Cloudy   < > Clear   URINEGLC 50* Negative 300* >500*   < > 100*   URINEBILI Negative Negative Negative Negative   < > Negative   URINEKETONE Negative Negative Negative Negative   < > Negative   SG 1.008 1.007 1.010 1.014   < > 1.025   UBLD Negative Negative Negative Negative   < > Small*   URINEPH 5.0 6.5 7.5* 6.0   < > 6.0   PROTEIN >499* 100* >600* >500*   < > 100*   UROBILINOGEN  --   --   --   --   --  0.2   NITRITE Negative Negative Negative Negative   < > Negative   LEUKEST Negative Negative Small* Small*   < > Negative   RBCU 1 0 1 5*   < >  --    WBCU 2 1 44* 54*   < >  --     < > = values in this interval not displayed.     Recent Labs   Lab Test 12/17/19  1013 05/24/19  1420 12/07/18  1417 11/01/18  1533   UTPG 6.51* 6.28* 5.60* 6.71*     PTH  Recent Labs   Lab Test 01/17/20  1204 12/17/19  1010 01/18/19  1457   PTHI 383* 350* 126*     IRON STUDIES  Recent Labs   Lab Test 04/17/20  1118 03/13/20  1022 02/14/20  1041   IRON 63 80 63   * 210* 207*   IRONSAT 29 38 30   ELENA 579* 561* 494*     This telephone visit will be conducted via a call between you and your physician/provider. We have found that certain health care needs can be provided without the need for a physical exam.  This service lets us provide the care you need with a short phone conversation.  If a prescription is necessary we can send it directly to your pharmacy.  If lab work is needed we can place an order for that and you can then stop by our lab to have the test done at a later time.    Patient has given verbal consent for Telephone visit?  Yes    Phone call duration:   30  minutes    Silva Guerrero, APRN, CNP

## 2020-05-13 NOTE — PROGRESS NOTES
Caroline Supriya's daughter returned [facility] phone call. Informed Supriya that I would be faxing her mother's paperwork to [employer] and that she should be hearing from them soon.

## 2020-05-15 ENCOUNTER — VIRTUAL VISIT (OUTPATIENT)
Dept: ORTHOPEDICS | Facility: CLINIC | Age: 70
End: 2020-05-15
Payer: MEDICARE

## 2020-05-15 DIAGNOSIS — B35.1 OM (ONYCHOMYCOSIS): ICD-10-CM

## 2020-05-15 DIAGNOSIS — Z79.4 TYPE 2 DIABETES MELLITUS WITH DIABETIC NEUROPATHY, WITH LONG-TERM CURRENT USE OF INSULIN (H): Primary | ICD-10-CM

## 2020-05-15 DIAGNOSIS — Z89.612 S/P AKA (ABOVE KNEE AMPUTATION) UNILATERAL, LEFT (H): ICD-10-CM

## 2020-05-15 DIAGNOSIS — E11.40 TYPE 2 DIABETES MELLITUS WITH DIABETIC NEUROPATHY, WITH LONG-TERM CURRENT USE OF INSULIN (H): Primary | ICD-10-CM

## 2020-05-15 NOTE — PROGRESS NOTES
"Supriya Herr is a 70 year old female who is being evaluated via a billable video visit.      The patient has been notified of following:     \"This video visit will be conducted via a call between you and your physician/provider. We have found that certain health care needs can be provided without the need for an in-person physical exam.  This service lets us provide the care you need with a video conversation.  If a prescription is necessary we can send it directly to your pharmacy.  If lab work is needed we can place an order for that and you can then stop by our lab to have the test done at a later time.    Video visits are billed at different rates depending on your insurance coverage.  Please reach out to your insurance provider with any questions.    If during the course of the call the physician/provider feels a video visit is not appropriate, you will not be charged for this service.\"    Patient has given verbal consent for Video visit? Yes    How would you like to obtain your AVS? Riki    Patient would like the video invitation sent by: Send to e-mail at: [email or web address]    Will anyone else be joining your video visit? No        Video-Visit Details    Type of service:  Video Visit    Video Start Time: 1202  Video End Time: 12:12 PM    Originating Location (pt. Location): Home    Distant Location (provider location):  [facility]     Platform used for Video Visit: Marisa Pack DPM    Chief Complaint:   Chief Complaint   Patient presents with     RECHECK     follow up foot pain          Allergies   Allergen Reactions     Penicillins Rash     Unasyn Rash     No evidence SJS, but very uncomfortable and precipitated multiple provider visits. Would not use penicillins again if other options available.          Subjective: Supriya is a 70 year old female who presents via video for pain in her foot. Relates that the pain is in the ball of the right 2nd toe. Not unusual " and been there for years. Has elongated nails.     Objective  Data Unavailable Data Unavailable Data Unavailable Data Unavailable Data Unavailable 0 lbs 0 oz  Nails elongated. Right 2nd digit is laterally deviated.     Assessment: DM2 with left AKA and right onychomycosis.     Plan:   - Pt seen and evaluated  - Needs in person visit s/p COVID restrictions. Currently has an appt in derm on 6/5/20. Can see her that day and run to derm exam room to do nails.   - Pt to return to clinic in 2 weeks.

## 2020-05-20 ENCOUNTER — MYC REFILL (OUTPATIENT)
Dept: FAMILY MEDICINE | Facility: CLINIC | Age: 70
End: 2020-05-20

## 2020-05-20 ENCOUNTER — MYC REFILL (OUTPATIENT)
Dept: NEPHROLOGY | Facility: CLINIC | Age: 70
End: 2020-05-20

## 2020-05-20 ENCOUNTER — MYC REFILL (OUTPATIENT)
Dept: ENDOCRINOLOGY | Facility: CLINIC | Age: 70
End: 2020-05-20

## 2020-05-20 ENCOUNTER — OFFICE VISIT (OUTPATIENT)
Dept: TRANSPLANT | Facility: CLINIC | Age: 70
End: 2020-05-20
Attending: CLINICAL NURSE SPECIALIST
Payer: MEDICARE

## 2020-05-20 VITALS
WEIGHT: 199 LBS | OXYGEN SATURATION: 100 % | HEART RATE: 62 BPM | DIASTOLIC BLOOD PRESSURE: 55 MMHG | SYSTOLIC BLOOD PRESSURE: 150 MMHG | BODY MASS INDEX: 32.12 KG/M2

## 2020-05-20 DIAGNOSIS — T81.49XA WOUND INFECTION AFTER SURGERY: ICD-10-CM

## 2020-05-20 DIAGNOSIS — Z11.59 ENCOUNTER FOR SCREENING FOR OTHER VIRAL DISEASES: ICD-10-CM

## 2020-05-20 DIAGNOSIS — N18.5 CHRONIC KIDNEY DISEASE, STAGE 5, KIDNEY FAILURE (H): Primary | ICD-10-CM

## 2020-05-20 DIAGNOSIS — N18.5 CKD (CHRONIC KIDNEY DISEASE) STAGE 5, GFR LESS THAN 15 ML/MIN (H): Primary | ICD-10-CM

## 2020-05-20 DIAGNOSIS — E11.65 TYPE 2 DIABETES MELLITUS WITH HYPERGLYCEMIA, WITH LONG-TERM CURRENT USE OF INSULIN (H): ICD-10-CM

## 2020-05-20 DIAGNOSIS — F41.1 GAD (GENERALIZED ANXIETY DISORDER): ICD-10-CM

## 2020-05-20 DIAGNOSIS — Z79.4 TYPE 2 DIABETES MELLITUS WITH HYPERGLYCEMIA, WITH LONG-TERM CURRENT USE OF INSULIN (H): ICD-10-CM

## 2020-05-20 DIAGNOSIS — N18.5 ANEMIA OF CHRONIC RENAL FAILURE, STAGE 5 (H): ICD-10-CM

## 2020-05-20 DIAGNOSIS — Z09 FOLLOW-UP EXAMINATION AFTER VASCULAR SURGERY: ICD-10-CM

## 2020-05-20 DIAGNOSIS — D63.1 ANEMIA OF CHRONIC RENAL FAILURE, STAGE 5 (H): ICD-10-CM

## 2020-05-20 DIAGNOSIS — E55.9 VITAMIN D DEFICIENCY: ICD-10-CM

## 2020-05-20 DIAGNOSIS — E78.5 HYPERLIPIDEMIA LDL GOAL <100: ICD-10-CM

## 2020-05-20 DIAGNOSIS — N18.5 CKD (CHRONIC KIDNEY DISEASE) STAGE 5, GFR LESS THAN 15 ML/MIN (H): ICD-10-CM

## 2020-05-20 DIAGNOSIS — T82.9XXD COMPLICATION OF VASCULAR ACCESS FOR DIALYSIS, SUBSEQUENT ENCOUNTER: ICD-10-CM

## 2020-05-20 DIAGNOSIS — Z48.89 ENCOUNTER FOR POST SURGICAL WOUND CHECK: ICD-10-CM

## 2020-05-20 LAB
ALBUMIN SERPL-MCNC: 2.6 G/DL (ref 3.4–5)
ANION GAP SERPL CALCULATED.3IONS-SCNC: 6 MMOL/L (ref 3–14)
BUN SERPL-MCNC: 68 MG/DL (ref 7–30)
CALCIUM SERPL-MCNC: 8.8 MG/DL (ref 8.5–10.1)
CHLORIDE SERPL-SCNC: 113 MMOL/L (ref 94–109)
CO2 SERPL-SCNC: 21 MMOL/L (ref 20–32)
CREAT SERPL-MCNC: 5.59 MG/DL (ref 0.52–1.04)
FERRITIN SERPL-MCNC: 526 NG/ML (ref 8–252)
GFR SERPL CREATININE-BSD FRML MDRD: 7 ML/MIN/{1.73_M2}
GLUCOSE SERPL-MCNC: 107 MG/DL (ref 70–99)
HCT VFR BLD AUTO: 25.9 % (ref 35–47)
HGB BLD-MCNC: 8 G/DL (ref 11.7–15.7)
IRON SATN MFR SERPL: 26 % (ref 15–46)
IRON SERPL-MCNC: 51 UG/DL (ref 35–180)
PHOSPHATE SERPL-MCNC: 5.8 MG/DL (ref 2.5–4.5)
POTASSIUM SERPL-SCNC: 4.5 MMOL/L (ref 3.4–5.3)
SODIUM SERPL-SCNC: 140 MMOL/L (ref 133–144)
TIBC SERPL-MCNC: 197 UG/DL (ref 240–430)

## 2020-05-20 PROCEDURE — 86704 HEP B CORE ANTIBODY TOTAL: CPT

## 2020-05-20 PROCEDURE — G0499 HEPB SCREEN HIGH RISK INDIV: HCPCS

## 2020-05-20 PROCEDURE — G0463 HOSPITAL OUTPT CLINIC VISIT: HCPCS

## 2020-05-20 PROCEDURE — 83550 IRON BINDING TEST: CPT

## 2020-05-20 PROCEDURE — 85018 HEMOGLOBIN: CPT

## 2020-05-20 PROCEDURE — 36415 COLL VENOUS BLD VENIPUNCTURE: CPT

## 2020-05-20 PROCEDURE — 85014 HEMATOCRIT: CPT

## 2020-05-20 PROCEDURE — 82728 ASSAY OF FERRITIN: CPT

## 2020-05-20 PROCEDURE — 86706 HEP B SURFACE ANTIBODY: CPT

## 2020-05-20 PROCEDURE — G0472 HEP C SCREEN HIGH RISK/OTHER: HCPCS

## 2020-05-20 PROCEDURE — 80069 RENAL FUNCTION PANEL: CPT

## 2020-05-20 PROCEDURE — 83540 ASSAY OF IRON: CPT

## 2020-05-20 RX ORDER — CLINDAMYCIN HCL 300 MG
300 CAPSULE ORAL 4 TIMES DAILY
Qty: 40 CAPSULE | Refills: 0 | Status: SHIPPED | OUTPATIENT
Start: 2020-05-20 | End: 2020-08-07

## 2020-05-20 RX ORDER — ATORVASTATIN CALCIUM 20 MG/1
20 TABLET, FILM COATED ORAL EVERY EVENING
Qty: 90 TABLET | Refills: 2 | Status: CANCELLED | OUTPATIENT
Start: 2020-05-20

## 2020-05-20 ASSESSMENT — PAIN SCALES - GENERAL: PAINLEVEL: NO PAIN (0)

## 2020-05-20 NOTE — PROGRESS NOTES
Dialysis Access Service   Progress Note    S:  Ms. Herr is being seen today for surgical followup of her dialysis access.  She reports a little issues with the wound, and  no steal syndrome of the distal extremity. C/O small amount of drainage from left upper axillary incision site that enough to wet her clothing a week after surgery. Drainage and then stopped afterwards. She note a tear drop of drainage noted this morning again, no Derma Love covered incision site. No issues of left elbow crease incision site.  Denies pain, swelling, redness or warm to touch around incision sites. No pain or swelling in left arm, tingling/numbness or a change of color/temperature in left hand and fingers. S/P Left Upper Extremity Brachial Artery to Axillary Vein Arteriovenous Bovine Graft on 5/7/2020.    O:  Pulse:  [62] 62  BP: (150)/(55) 150/55  SpO2:  [100 %] 100 %  GENERAL: alert, cooperative  Circulation:   Radial pulse 3+  Ulnar pulse  3+   Capillary refill:  capillary refill < 2 sec    Sensory exam:   arm: Normal   [x]           Abnormal   []          Comment:    hand: Normal   [x]           Abnormal   []          Comment:   Motor exam:   arm: Normal   [x]           Abnormal   []          Comment:    hand: Normal   [x]           Abnormal   []          Comment:    Access: L upper extremity wound(s) healing, non-tender. No venous hypertension, ++ thrill and bruit via hand held present. Left upper arm axillary incision has small opening gap with teardrop drainage noted, without Derma Love covered wound. Slight redness along the edges of incision site. No edema, redness or warm to touch around incision. A small fluid collection area around AC fossa incision with Derma Love intact. No edema, redness or warm to touch around incision.     Assessment & Plan: Ms. Herr's dialysis access has matured well at this time point.    1. Apply steri strip to reinforce axillary incision site  2. Clindamycin 300 mg every 6 hours by mouth  for 10 days  3. Elevate left arm with support as tolerated  4. Check thrill from LUE AV graft twice a day  5. Follow up with Dr. Martin in 2 weeks    We would like to see the patient back in the clinic in 2 weeks time to assess progress. The patient was counselled to contact our nurse coordinator, PENELOPE Hollingsworth CNS (Sum) at [phone] with any questions or concerns.  Thank you for the opportunity to participate in Ms. Herr's care.    TT: 20 min  CT: 20 min    KASSI Yoder (Sum)  Dialysis Vascular Access/SOT Clinical Nurse Specialist    Solid Organ Transplant Service - [facility]   Phone # [phone]  Pager # [phone]

## 2020-05-20 NOTE — LETTER
5/20/2020         RE: Supriya Herr  [address]  [facility] [address]        Dear Colleague,    Thank you for referring your patient, Supriya Herr, to the [facility] SOLID ORGAN TRANSPLANT. Please see a copy of my visit note below.    Dialysis Access Service   Progress Note    S:  Ms. Herr is being seen today for surgical followup of her dialysis access.  She reports a little issues with the wound, and  no steal syndrome of the distal extremity. C/O small amount of drainage from left upper axillary incision site that enough to wet her clothing a week after surgery. Drainage and then stopped afterwards. She note a tear drop of drainage noted this morning again, no Derma Love covered incision site. No issues of left elbow crease incision site.  Denies pain, swelling, redness or warm to touch around incision sites. No pain or swelling in left arm, tingling/numbness or a change of color/temperature in left hand and fingers. S/P Left Upper Extremity Brachial Artery to Axillary Vein Arteriovenous Bovine Graft on 5/7/2020.    O:  Pulse:  [62] 62  BP: (150)/(55) 150/55  SpO2:  [100 %] 100 %  GENERAL: alert, cooperative  Circulation:   Radial pulse 3+  Ulnar pulse  3+   Capillary refill:  capillary refill < 2 sec    Sensory exam:   arm: Normal   [x]           Abnormal   []          Comment:    hand: Normal   [x]           Abnormal   []          Comment:   Motor exam:   arm: Normal   [x]           Abnormal   []          Comment:    hand: Normal   [x]           Abnormal   []          Comment:    Access: L upper extremity wound(s) healing, non-tender. No venous hypertension, ++ thrill and bruit via hand held present. Left upper arm axillary incision has small opening gap with teardrop drainage noted, without Derma Love covered wound. Slight redness along the edges of incision site. No edema, redness or warm to touch around incision. A small fluid collection area around AC fossa incision with Sandia Love  intact. No edema, redness or warm to touch around incision.     Assessment & Plan: Ms. Herr's dialysis access has matured well at this time point.    1. Apply steri strip to reinforce axillary incision site  2. Clindamycin 300 mg every 6 hours by mouth for 10 days  3. Elevate left arm with support as tolerated  4. Check thrill from LUE AV graft twice a day  5. Follow up with Dr. Martin in 2 weeks    We would like to see the patient back in the clinic in 2 weeks time to assess progress. The patient was counselled to contact our nurse coordinator, PENELOPE Hollingsworth CNS (Sum) at [phone] with any questions or concerns.  Thank you for the opportunity to participate in Ms. Herr's care.    TT: 20 min  CT: 20 min    KASSI Yoder (Sum)  Dialysis Vascular Access/SOT Clinical Nurse Specialist    Solid Organ Transplant Service - [facility]   Phone # [phone]  Pager # [phone]    Again, thank you for allowing me to participate in the care of your patient.        Sincerely,        PENELOPE Barrera

## 2020-05-21 ENCOUNTER — MYC MEDICAL ADVICE (OUTPATIENT)
Dept: FAMILY MEDICINE | Facility: CLINIC | Age: 70
End: 2020-05-21

## 2020-05-21 ENCOUNTER — TELEPHONE (OUTPATIENT)
Dept: PHARMACY | Facility: CLINIC | Age: 70
End: 2020-05-21

## 2020-05-21 DIAGNOSIS — F41.1 GAD (GENERALIZED ANXIETY DISORDER): ICD-10-CM

## 2020-05-21 DIAGNOSIS — N18.5 CKD (CHRONIC KIDNEY DISEASE) STAGE 5, GFR LESS THAN 15 ML/MIN (H): Primary | ICD-10-CM

## 2020-05-21 LAB
HBV CORE AB SERPL QL IA: NONREACTIVE
HBV SURFACE AB SERPL IA-ACNC: 0.33 M[IU]/ML
HBV SURFACE AG SERPL QL IA: NONREACTIVE
HCV AB SERPL QL IA: NONREACTIVE

## 2020-05-21 RX ORDER — CHOLECALCIFEROL (VITAMIN D3) 50 MCG
2000 TABLET ORAL DAILY
Qty: 100 TABLET | Refills: 3 | Status: SHIPPED | OUTPATIENT
Start: 2020-05-21 | End: 2021-06-01

## 2020-05-21 NOTE — TELEPHONE ENCOUNTER
Spoke with pharmacy, last script from Jan came up as inactive in their system.     New script sent over, they will fill today.     Ember Shah RN   [facility]

## 2020-05-21 NOTE — TELEPHONE ENCOUNTER
Follow-up with anemia management service:    Spoke with daughter, scheduled labs and Aranesp for 6/5/20 at 11a  And 11:30.     Anemia Latest Ref Rng & Units 3/27/2020 4/17/2020 5/4/2020 5/7/2020 5/8/2020 5/8/2020 5/20/2020   KELLIE Dose - - - 40mcg - - - -   Hemoglobin 11.7 - 15.7 g/dL 10.4(L) 10.0(L) 9.4(L) 9.6(L) 7.5(L) 7.6(L) 8.0(L)   TSAT 15 - 46 % - 29 - - - - 26   Ferritin 8 - 252 ng/mL - 579(H) - - - - 526(H)           Follow-up call date: 6/5/20    Leslie Rivas RN   Anemia Services  [address] [place] [address]   [email or web address]   Office : [phone]  Fax: [phone]

## 2020-05-21 NOTE — TELEPHONE ENCOUNTER
Pharmacy has refills on file for atorvastatin and will fill.     Ember Shah RN   [facility]

## 2020-05-21 NOTE — PROGRESS NOTES
Ordered TB Gold Plus, as PPD was inadvertently ordered.    Patient cannot have this added onto stored blood, will draw at different date.

## 2020-05-22 ENCOUNTER — TELEPHONE (OUTPATIENT)
Dept: NEPHROLOGY | Facility: CLINIC | Age: 70
End: 2020-05-22

## 2020-05-22 NOTE — TELEPHONE ENCOUNTER
Caroline, patient's daughter, called [facility] to report more drainage coming from her mother's surgical site, as had been advised by PENELOPE Yang. She reports it is clear and not yellow or green, nor foul smell to the drainage. Supriya is afebrile.     The drainage is enough that it soaks through her mother's shirt and onto her bra. She is concerned that the small steri-strip on the wound is not enough reinforcement to successfully close the site and is using gauze to cover it, as well.    [facility] will notify the team involved to see if there are better dressings she could wear or other intervention is necessary. Patient will continue to take her antibiotics every 6 hours as ordered.

## 2020-05-24 ENCOUNTER — TELEPHONE (OUTPATIENT)
Dept: TRANSPLANT | Facility: CLINIC | Age: 70
End: 2020-05-24

## 2020-05-24 NOTE — TELEPHONE ENCOUNTER
Caroline, pt's daughter, called to speak to DR Estrella. He is currently unavailable.    Spoke with Caroline she reports that she spoke with DR Estrella or Dr Hu yesterday at 6 pm regarding concerns of her mom's blood clots in her nose. She had intermittent clots coming out her nose. She reprots clots came out 856, 930, 10, 1055 and 230 am. Supriya feels her nose now is just a big clot and it's no longer oozing, but was told she must speak with the doctor about it today. Not interested in speaking to RN.   Paged DR Estrella and DR Garcia.  paged Dr Hu.

## 2020-05-26 ENCOUNTER — TELEPHONE (OUTPATIENT)
Dept: TRANSPLANT | Facility: CLINIC | Age: 70
End: 2020-05-26

## 2020-05-26 DIAGNOSIS — T82.9XXD COMPLICATION OF VASCULAR ACCESS FOR DIALYSIS, SUBSEQUENT ENCOUNTER: ICD-10-CM

## 2020-05-26 DIAGNOSIS — T82.49XA CLOTTED DIALYSIS ACCESS, INITIAL ENCOUNTER (H): ICD-10-CM

## 2020-05-26 DIAGNOSIS — N18.6 ESRD ON DIALYSIS (H): Primary | ICD-10-CM

## 2020-05-26 DIAGNOSIS — Z99.2 ESRD ON DIALYSIS (H): Primary | ICD-10-CM

## 2020-06-01 ENCOUNTER — TELEPHONE (OUTPATIENT)
Dept: DERMATOLOGY | Facility: CLINIC | Age: 70
End: 2020-06-01

## 2020-06-01 NOTE — TELEPHONE ENCOUNTER
Received a VM from patient's daughter Caroline Gray over the weekend. She reported that patient's LUE AV graft axillary incision wound has a increased drainage after clinic visit.  Writer called back to daughter when returned to office on 5/26/2020. Daughter reports drainage from LUE AV graft axillary incision wound subsided after taking antibiotics. Steri strip is intact, no other S/S of infection. She also reports patient had nose bleed over the weekend and spoke with a [place] surgeon to hold Plavix. Nose bleed subsided as well.  Writer recommended to continue monitor LUE AV graft axillary incision site. If drainage persists, will schedule to see patient in clinic to assess sooner than 6/8/2020 F/U appt. Instructed daughter to call writer in the next a couple of days. Writer provided contact phone number and instructed patient's daughter Caroline Gray to call writer with any questions or concerns. She verbalized acceptance and understanding of plan.

## 2020-06-05 ENCOUNTER — CARE COORDINATION (OUTPATIENT)
Dept: NEPHROLOGY | Facility: CLINIC | Age: 70
End: 2020-06-05

## 2020-06-05 ENCOUNTER — HOSPITAL ENCOUNTER (EMERGENCY)
Facility: CLINIC | Age: 70
Discharge: HOME | End: 2020-06-05
Attending: EMERGENCY MEDICINE | Admitting: EMERGENCY MEDICINE
Payer: MEDICARE

## 2020-06-05 ENCOUNTER — TELEPHONE (OUTPATIENT)
Dept: NEPHROLOGY | Facility: CLINIC | Age: 70
End: 2020-06-05

## 2020-06-05 ENCOUNTER — OFFICE VISIT (OUTPATIENT)
Dept: ORTHOPEDICS | Facility: CLINIC | Age: 70
End: 2020-06-05
Payer: MEDICARE

## 2020-06-05 VITALS
DIASTOLIC BLOOD PRESSURE: 79 MMHG | HEART RATE: 69 BPM | BODY MASS INDEX: 31.98 KG/M2 | OXYGEN SATURATION: 100 % | TEMPERATURE: 98.4 F | SYSTOLIC BLOOD PRESSURE: 172 MMHG | WEIGHT: 199 LBS | RESPIRATION RATE: 15 BRPM | HEIGHT: 66 IN

## 2020-06-05 DIAGNOSIS — N18.9 ANEMIA DUE TO CHRONIC KIDNEY DISEASE, UNSPECIFIED CKD STAGE: ICD-10-CM

## 2020-06-05 DIAGNOSIS — Z89.612 S/P AKA (ABOVE KNEE AMPUTATION) UNILATERAL, LEFT (H): ICD-10-CM

## 2020-06-05 DIAGNOSIS — B35.1 OM (ONYCHOMYCOSIS): ICD-10-CM

## 2020-06-05 DIAGNOSIS — D63.1 ANEMIA OF CHRONIC RENAL FAILURE, STAGE 5 (H): ICD-10-CM

## 2020-06-05 DIAGNOSIS — N18.5 ANEMIA IN STAGE 5 CHRONIC KIDNEY DISEASE, NOT ON CHRONIC DIALYSIS (H): ICD-10-CM

## 2020-06-05 DIAGNOSIS — D63.1 ANEMIA DUE TO CHRONIC KIDNEY DISEASE, UNSPECIFIED CKD STAGE: ICD-10-CM

## 2020-06-05 DIAGNOSIS — H93.11 TINNITUS, RIGHT: ICD-10-CM

## 2020-06-05 DIAGNOSIS — D62 ANEMIA DUE TO BLOOD LOSS, ACUTE: ICD-10-CM

## 2020-06-05 DIAGNOSIS — N18.5 ANEMIA OF CHRONIC RENAL FAILURE, STAGE 5 (H): ICD-10-CM

## 2020-06-05 DIAGNOSIS — L84 TYLOMA: ICD-10-CM

## 2020-06-05 DIAGNOSIS — E11.49 TYPE II OR UNSPECIFIED TYPE DIABETES MELLITUS WITH NEUROLOGICAL MANIFESTATIONS, NOT STATED AS UNCONTROLLED(250.60) (H): ICD-10-CM

## 2020-06-05 DIAGNOSIS — D63.1 ANEMIA IN STAGE 5 CHRONIC KIDNEY DISEASE, NOT ON CHRONIC DIALYSIS (H): ICD-10-CM

## 2020-06-05 DIAGNOSIS — N18.5 CKD (CHRONIC KIDNEY DISEASE) STAGE 5, GFR LESS THAN 15 ML/MIN (H): ICD-10-CM

## 2020-06-05 DIAGNOSIS — E11.40 TYPE 2 DIABETES MELLITUS WITH DIABETIC NEUROPATHY, WITH LONG-TERM CURRENT USE OF INSULIN (H): Primary | ICD-10-CM

## 2020-06-05 DIAGNOSIS — Z79.4 TYPE 2 DIABETES MELLITUS WITH DIABETIC NEUROPATHY, WITH LONG-TERM CURRENT USE OF INSULIN (H): Primary | ICD-10-CM

## 2020-06-05 LAB
ABO + RH BLD: NORMAL
ABO + RH BLD: NORMAL
ALBUMIN SERPL-MCNC: 2.6 G/DL (ref 3.4–5)
ALBUMIN SERPL-MCNC: 2.8 G/DL (ref 3.4–5)
ALP SERPL-CCNC: 53 U/L (ref 40–150)
ALT SERPL W P-5'-P-CCNC: 12 U/L (ref 0–50)
ANION GAP SERPL CALCULATED.3IONS-SCNC: 10 MMOL/L (ref 3–14)
ANION GAP SERPL CALCULATED.3IONS-SCNC: 10 MMOL/L (ref 3–14)
APTT PPP: 29 SEC (ref 22–37)
AST SERPL W P-5'-P-CCNC: 6 U/L (ref 0–45)
BASOPHILS # BLD AUTO: 0 10E9/L (ref 0–0.2)
BASOPHILS NFR BLD AUTO: 0.5 %
BILIRUB DIRECT SERPL-MCNC: <0.1 MG/DL (ref 0–0.2)
BILIRUB SERPL-MCNC: 0.1 MG/DL (ref 0.2–1.3)
BLD GP AB SCN SERPL QL: NORMAL
BLD PROD TYP BPU: NORMAL
BLD UNIT ID BPU: 0
BLD UNIT ID BPU: 0
BLOOD BANK CMNT PATIENT-IMP: NORMAL
BLOOD PRODUCT CODE: NORMAL
BLOOD PRODUCT CODE: NORMAL
BPU ID: NORMAL
BPU ID: NORMAL
BUN SERPL-MCNC: 137 MG/DL (ref 7–30)
BUN SERPL-MCNC: 139 MG/DL (ref 7–30)
CALCIUM SERPL-MCNC: 8.1 MG/DL (ref 8.5–10.1)
CALCIUM SERPL-MCNC: 8.2 MG/DL (ref 8.5–10.1)
CHLORIDE SERPL-SCNC: 112 MMOL/L (ref 94–109)
CHLORIDE SERPL-SCNC: 112 MMOL/L (ref 94–109)
CO2 SERPL-SCNC: 17 MMOL/L (ref 20–32)
CO2 SERPL-SCNC: 18 MMOL/L (ref 20–32)
CREAT SERPL-MCNC: 7.33 MG/DL (ref 0.52–1.04)
CREAT SERPL-MCNC: 7.34 MG/DL (ref 0.52–1.04)
DIFFERENTIAL METHOD BLD: ABNORMAL
EOSINOPHIL # BLD AUTO: 0.1 10E9/L (ref 0–0.7)
EOSINOPHIL NFR BLD AUTO: 1.7 %
ERYTHROCYTE [DISTWIDTH] IN BLOOD BY AUTOMATED COUNT: 14.2 % (ref 10–15)
GFR SERPL CREATININE-BSD FRML MDRD: 5 ML/MIN/{1.73_M2}
GFR SERPL CREATININE-BSD FRML MDRD: 5 ML/MIN/{1.73_M2}
GLUCOSE BLDC GLUCOMTR-MCNC: 105 MG/DL (ref 70–99)
GLUCOSE SERPL-MCNC: 114 MG/DL (ref 70–99)
GLUCOSE SERPL-MCNC: 90 MG/DL (ref 70–99)
HCT VFR BLD AUTO: 16.3 % (ref 35–47)
HCT VFR BLD AUTO: 16.6 % (ref 35–47)
HGB BLD-MCNC: 5 G/DL (ref 11.7–15.7)
HGB BLD-MCNC: 5.1 G/DL (ref 11.7–15.7)
IMM GRANULOCYTES # BLD: 0 10E9/L (ref 0–0.4)
IMM GRANULOCYTES NFR BLD: 0.3 %
INR PPP: 1.16 (ref 0.86–1.14)
LYMPHOCYTES # BLD AUTO: 1.1 10E9/L (ref 0.8–5.3)
LYMPHOCYTES NFR BLD AUTO: 19.3 %
MCH RBC QN AUTO: 29.8 PG (ref 26.5–33)
MCHC RBC AUTO-ENTMCNC: 30.7 G/DL (ref 31.5–36.5)
MCV RBC AUTO: 97 FL (ref 78–100)
MONOCYTES # BLD AUTO: 0.3 10E9/L (ref 0–1.3)
MONOCYTES NFR BLD AUTO: 5.7 %
NEUTROPHILS # BLD AUTO: 4.3 10E9/L (ref 1.6–8.3)
NEUTROPHILS NFR BLD AUTO: 72.5 %
NRBC # BLD AUTO: 0 10*3/UL
NRBC BLD AUTO-RTO: 0 /100
NUM BPU REQUESTED: 2
PHOSPHATE SERPL-MCNC: 6.7 MG/DL (ref 2.5–4.5)
PLATELET # BLD AUTO: 168 10E9/L (ref 150–450)
POTASSIUM SERPL-SCNC: 4.4 MMOL/L (ref 3.4–5.3)
POTASSIUM SERPL-SCNC: 4.8 MMOL/L (ref 3.4–5.3)
PROT SERPL-MCNC: 6.1 G/DL (ref 6.8–8.8)
RBC # BLD AUTO: 1.71 10E12/L (ref 3.8–5.2)
SODIUM SERPL-SCNC: 139 MMOL/L (ref 133–144)
SODIUM SERPL-SCNC: 139 MMOL/L (ref 133–144)
SPECIMEN EXP DATE BLD: NORMAL
TRANSFUSION STATUS PATIENT QL: NORMAL
WBC # BLD AUTO: 5.9 10E9/L (ref 4–11)

## 2020-06-05 PROCEDURE — 85610 PROTHROMBIN TIME: CPT | Performed by: EMERGENCY MEDICINE

## 2020-06-05 PROCEDURE — 96374 THER/PROPH/DIAG INJ IV PUSH: CPT | Performed by: EMERGENCY MEDICINE

## 2020-06-05 PROCEDURE — 86901 BLOOD TYPING SEROLOGIC RH(D): CPT | Performed by: EMERGENCY MEDICINE

## 2020-06-05 PROCEDURE — 85025 COMPLETE CBC W/AUTO DIFF WBC: CPT | Performed by: EMERGENCY MEDICINE

## 2020-06-05 PROCEDURE — 86923 COMPATIBILITY TEST ELECTRIC: CPT | Performed by: EMERGENCY MEDICINE

## 2020-06-05 PROCEDURE — 86481 TB AG RESPONSE T-CELL SUSP: CPT

## 2020-06-05 PROCEDURE — 80053 COMPREHEN METABOLIC PANEL: CPT | Performed by: EMERGENCY MEDICINE

## 2020-06-05 PROCEDURE — 25000128 H RX IP 250 OP 636: Performed by: EMERGENCY MEDICINE

## 2020-06-05 PROCEDURE — 36430 TRANSFUSION BLD/BLD COMPNT: CPT | Performed by: EMERGENCY MEDICINE

## 2020-06-05 PROCEDURE — 85730 THROMBOPLASTIN TIME PARTIAL: CPT | Performed by: EMERGENCY MEDICINE

## 2020-06-05 PROCEDURE — 00000146 ZZHCL STATISTIC GLUCOSE BY METER IP

## 2020-06-05 PROCEDURE — P9016 RBC LEUKOCYTES REDUCED: HCPCS | Performed by: EMERGENCY MEDICINE

## 2020-06-05 PROCEDURE — 99285 EMERGENCY DEPT VISIT HI MDM: CPT | Mod: Z6 | Performed by: EMERGENCY MEDICINE

## 2020-06-05 PROCEDURE — 99285 EMERGENCY DEPT VISIT HI MDM: CPT | Mod: 25 | Performed by: EMERGENCY MEDICINE

## 2020-06-05 PROCEDURE — 86850 RBC ANTIBODY SCREEN: CPT | Performed by: EMERGENCY MEDICINE

## 2020-06-05 PROCEDURE — 82248 BILIRUBIN DIRECT: CPT | Performed by: EMERGENCY MEDICINE

## 2020-06-05 PROCEDURE — 86900 BLOOD TYPING SEROLOGIC ABO: CPT | Performed by: EMERGENCY MEDICINE

## 2020-06-05 RX ORDER — FUROSEMIDE 10 MG/ML
40 INJECTION INTRAMUSCULAR; INTRAVENOUS ONCE
Status: COMPLETED | OUTPATIENT
Start: 2020-06-05 | End: 2020-06-05

## 2020-06-05 RX ADMIN — FUROSEMIDE 40 MG: 10 INJECTION, SOLUTION INTRAMUSCULAR; INTRAVENOUS at 19:23

## 2020-06-05 ASSESSMENT — ENCOUNTER SYMPTOMS
SHORTNESS OF BREATH: 0
VOMITING: 0
NAUSEA: 0
BRUISES/BLEEDS EASILY: 1
LIGHT-HEADEDNESS: 1
ABDOMINAL PAIN: 0
DIARRHEA: 0

## 2020-06-05 ASSESSMENT — MIFFLIN-ST. JEOR: SCORE: 1434.41

## 2020-06-05 NOTE — ED TRIAGE NOTES
"Triage Assessment & Note:    /48   Pulse 63   Temp 97.9  F (36.6  C) (Oral)   Resp 16   Ht 1.676 m (5' 6\")   Wt 90.3 kg (199 lb)   SpO2 100%   BMI 32.12 kg/m        Patient presents with: Pt with kidney dx comes to triage from clinic with reports of low hemoglobin. Pt reports that she was on blood thinners and was getting bad nose bleeds. Pt has since stopped taking blood thinners and is no longer having bleeding issues. Pt reports some weakness and being tired. No reports of cough, SOB, CP, fever, or travel.     Home Treatments/Remedies: Home medications    Febrile / Afebrile: afebrile    Duration of C/o: n/a    Alina Blanco RN  June 5, 2020        "

## 2020-06-05 NOTE — DISCHARGE INSTRUCTIONS
Please make an appointment to follow up with your nephrologist as scheduled on Monday and have laboratory studies redrawn.     Return to the ED if you develop shortness of breath, chest pain, bleeding from your nose that is not stopped by pressure, blood in the stool or vomit, vomiting, fever, light headedness, dizziness, numbness, weakness, loss of consciousness, confusion, or any new or worsening concerns.

## 2020-06-05 NOTE — ED AVS SNAPSHOT
[facility], [place], Emergency Department  [address] [place] [id number]  Phone:  [phone]                                    Supriya Herr   MRN: [id number]    Department:  [facility], Emergency Department   Date of Visit:  6/5/2020           After Visit Summary Signature Page    I have received my discharge instructions, and my questions have been answered. I have discussed any challenges I see with this plan with the nurse or doctor.    ..........................................................................................................................................  Patient/Patient Representative Signature      ..........................................................................................................................................  Patient Representative Print Name and Relationship to Patient    ..................................................               ................................................  Date                                   Time    ..........................................................................................................................................  Reviewed by Signature/Title    ...................................................              ..............................................  Date                                               Time          22EPIC Rev 08/18

## 2020-06-05 NOTE — TELEPHONE ENCOUNTER
Critical value Hgb 5.0  Informed Ethel Guerrero, Elicia Morton, RNCC    Lu Goodwin LPN  Nephrology  [phone]

## 2020-06-05 NOTE — PROGRESS NOTES
Chief Complaint   Patient presents with     RECHECK     Right foot pain.  1 week ago              Allergies   Allergen Reactions     Penicillins Rash     Unasyn Rash     No evidence SJS, but very uncomfortable and precipitated multiple provider visits. Would not use penicillins again if other options available.          Subjective: Supriya is a 69 year old female who presents to the clinic today for a diabetic foot exam and management. She relates that she has No new foot complaints.      Objective    Hemoglobin A1C   Date Value Ref Range Status   06/22/2018 6.0 (H) 0 - 5.6 % Final     Comment:     Normal <5.7% Prediabetes 5.7-6.4%  Diabetes 6.5% or higher - adopted from ADA   consensus guidelines.           Non-palpable DP and PT pulses BL.   Equinus noted BL. Pes planus with rigid toe deformities noted to lesser digits on the right. Left AKA noted.   Nails are thickened, discolored, elongated, with subungual debris consistent with onychomycosis.    Scabbed venous ulcer on the anterior right leg. No s/s of infection.  No open lesion associated. No bleeding. No pain to the area. Small scar noted to the plantar right 1st interspace. No s/s of infection. Hyperkeratosis to the area. Irritation erythema to the dorsal right foot.      Assessment: DM2 with left AKA and neuropathy - presenting for a diabetic foot exam.   Onychomycosis.   Tyloma     Plan:   - Pt seen and evaluated  - Nails debrided x 5.  - Tyloma debrided x 1  - Cont compression socks.  - See again in 3 months.

## 2020-06-05 NOTE — PROGRESS NOTES
"Caroline, patient's daughter, called [facility] to clarify what is coming up today and for the next several days.    Supriya is to do labs today, as well as see [facility] for aranesp injection. While at the [facility], Supriya is to ask the nurse to evaluate for jaundice, as two people over the past week have informed Supriya that she appears \"yellow.\" Will notify Leslie Rivas of this.    Arm looks improved, according to Caroline. No more drainage, and Supriya afebrile.    Blood pressures are 140s/60s, HR 60s-70s.    She should contact the after hours number for anything urgent after 5 pm, and report chest discomfort/palpitations/confusions/anuria.     Appointment on Monday with Dr. Grajeda to evaluate fistula for approval to use post infection. Once cleared and Yen receives final viral testing (Quant Gold) result, she should be able to dialyze next week.     Caroline was very appreciative of the information.    "

## 2020-06-05 NOTE — PROGRESS NOTES
Change to plan. Due to Hgb of 5, patient needing ED evaluation and possible blood transfusion, etc. Caroline informed via phone and agreeable to bring her mother back.

## 2020-06-05 NOTE — TELEPHONE ENCOUNTER
Patient needing to be seen in ED given low hemoglobin and possible bleeding from AVG.   Called Caroline to ask that she bring her mother to the ED. Caroline states that her mother received Aranesp today, though unable to see that yet in chart.    Caroline will bring Supriya back to ED at [facility] for evaluation.
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
## 2020-06-05 NOTE — LETTER
6/5/2020       RE: Supriya Herr  [address]  [place] [address]      Dear Colleague,    Thank you for referring your patient, Supriya Herr, to the [facility]. Please see a copy of my visit note below.    Chief Complaint   Patient presents with     RECHECK     Right foot pain.  1 week ago              Allergies   Allergen Reactions     Penicillins Rash     Unasyn Rash     No evidence SJS, but very uncomfortable and precipitated multiple provider visits. Would not use penicillins again if other options available.          Subjective: Supriya is a 69 year old female who presents to the clinic today for a diabetic foot exam and management. She relates that she has No new foot complaints.      Objective    Hemoglobin A1C   Date Value Ref Range Status   06/22/2018 6.0 (H) 0 - 5.6 % Final     Comment:     Normal <5.7% Prediabetes 5.7-6.4%  Diabetes 6.5% or higher - adopted from ADA   consensus guidelines.           Non-palpable DP and PT pulses BL.   Equinus noted BL. Pes planus with rigid toe deformities noted to lesser digits on the right. Left AKA noted.   Nails are thickened, discolored, elongated, with subungual debris consistent with onychomycosis.    Scabbed venous ulcer on the anterior right leg. No s/s of infection.  No open lesion associated. No bleeding. No pain to the area. Small scar noted to the plantar right 1st interspace. No s/s of infection. Hyperkeratosis to the area. Irritation erythema to the dorsal right foot.      Assessment: DM2 with left AKA and neuropathy - presenting for a diabetic foot exam.   Onychomycosis.   Tyloma     Plan:   - Pt seen and evaluated  - Nails debrided x 5.  - Tyloma debrided x 1  - Cont compression socks.  - See again in 3 months.    Again, thank you for allowing me to participate in the care of your patient.      Sincerely,      Eleazar Pack DPM

## 2020-06-08 ENCOUNTER — OFFICE VISIT (OUTPATIENT)
Dept: TRANSPLANT | Facility: CLINIC | Age: 70
End: 2020-06-08
Attending: SURGERY
Payer: MEDICARE

## 2020-06-08 ENCOUNTER — TELEPHONE (OUTPATIENT)
Dept: PHARMACY | Facility: CLINIC | Age: 70
End: 2020-06-08

## 2020-06-08 ENCOUNTER — TELEPHONE (OUTPATIENT)
Dept: TRANSPLANT | Facility: CLINIC | Age: 70
End: 2020-06-08

## 2020-06-08 VITALS
TEMPERATURE: 98 F | DIASTOLIC BLOOD PRESSURE: 65 MMHG | SYSTOLIC BLOOD PRESSURE: 160 MMHG | HEART RATE: 63 BPM | OXYGEN SATURATION: 98 %

## 2020-06-08 DIAGNOSIS — Z09 FOLLOW-UP EXAMINATION AFTER VASCULAR SURGERY: ICD-10-CM

## 2020-06-08 DIAGNOSIS — N18.5 CKD (CHRONIC KIDNEY DISEASE) STAGE 5, GFR LESS THAN 15 ML/MIN (H): Primary | ICD-10-CM

## 2020-06-08 DIAGNOSIS — Z48.89 ENCOUNTER FOR POST SURGICAL WOUND CHECK: ICD-10-CM

## 2020-06-08 DIAGNOSIS — N18.5 CKD (CHRONIC KIDNEY DISEASE) STAGE 5, GFR LESS THAN 15 ML/MIN (H): ICD-10-CM

## 2020-06-08 LAB
ALBUMIN SERPL-MCNC: 2.7 G/DL (ref 3.4–5)
ANION GAP SERPL CALCULATED.3IONS-SCNC: 11 MMOL/L (ref 3–14)
BUN SERPL-MCNC: 144 MG/DL (ref 7–30)
CALCIUM SERPL-MCNC: 8.2 MG/DL (ref 8.5–10.1)
CHLORIDE SERPL-SCNC: 114 MMOL/L (ref 94–109)
CO2 SERPL-SCNC: 17 MMOL/L (ref 20–32)
CREAT SERPL-MCNC: 7.05 MG/DL (ref 0.52–1.04)
GAMMA INTERFERON BACKGROUND BLD IA-ACNC: 0.04 IU/ML
GFR SERPL CREATININE-BSD FRML MDRD: 5 ML/MIN/{1.73_M2}
GLUCOSE SERPL-MCNC: 100 MG/DL (ref 70–99)
HGB BLD-MCNC: 6.5 G/DL (ref 11.7–15.7)
M TB IFN-G BLD-IMP: NEGATIVE
M TB IFN-G CD4+ BCKGRND COR BLD-ACNC: 6.1 IU/ML
MITOGEN IGNF BCKGRD COR BLD-ACNC: 0.02 IU/ML
MITOGEN IGNF BCKGRD COR BLD-ACNC: 0.04 IU/ML
PHOSPHATE SERPL-MCNC: 7 MG/DL (ref 2.5–4.5)
POTASSIUM SERPL-SCNC: 4.4 MMOL/L (ref 3.4–5.3)
SODIUM SERPL-SCNC: 142 MMOL/L (ref 133–144)

## 2020-06-08 PROCEDURE — G0463 HOSPITAL OUTPT CLINIC VISIT: HCPCS | Mod: ZF

## 2020-06-08 PROCEDURE — 85018 HEMOGLOBIN: CPT

## 2020-06-08 PROCEDURE — 80069 RENAL FUNCTION PANEL: CPT

## 2020-06-08 NOTE — LETTER
6/8/2020         RE: Supriya Herr  [address]  [place] [address]      Chief Complaint   Patient presents with     RECHECK     Follow-up     Blood pressure (!) 160/65, pulse 63, temperature 98  F (36.7  C), temperature source Oral, SpO2 98 %, not currently breastfeeding.    Gay Rodas, [facility]      Dialysis Access Service   Progress Note    S:  Ms. Herr is being seen today for surgical followup of her dialysis access.  She reports no issues with the wound, and  no steal syndrome of the distal extremity. She reports LUE AV graft axillary incision wound drainage subsided and she finished Clindamycin 10 days doses. She had nose bleed 10 days ago and spoke with a [place] surgeon to hold Plavix and took   mg for  2 weeks. Nose bleed subsided at this time. She is ready for initiation of dialysis. Denies pain, swelling, redness or warm to touch around incision sites. No pain or swelling in left arm, tingling/numbness or a change of color/temperature in left hand and fingers. S/P Left Upper Extremity Brachial Artery to Axillary Vein Arteriovenous Bovine Graft on 5/7/2020.     O:  Temp:  [98  F (36.7  C)] 98  F (36.7  C)  Pulse:  [63] 63  BP: (160)/(65) 160/65  SpO2:  [98 %] 98 %  GENERAL: alert, cooperative  Circulation:   Radial pulse 3+  Ulnar pulse  3+   Capillary refill:  capillary refill < 2 sec    Sensory exam:   arm: Normal   [x]           Abnormal   []          Comment:    hand: Normal   [x]           Abnormal   []          Comment:   Motor exam:   arm: Normal   [x]           Abnormal   []          Comment:    hand: Normal   [x]           Abnormal   []          Comment:    Access: L upper extremity wound(s) healed. non-tender. No venous hypertension, ++ thrill but pulsatile. Left upper arm axillary incision has small opening gap, wound is clean and dry. No edema, redness or warm to touch around incision.    Assessment & Plan: Ms. Herr's dialysis access has matured well at this time point.     1. Apply steri strip to reinforce axillary incision site  2. May use LUE AV graft for dialysis as needed  3. Fistulogram of AV graft may be needed if there is any issues on dialysis  4. Follow up in clinic as needed     We would like to see the patient back in the clinic as needed to assess progress. The patient was counselled to contact our nurse coordinator, PENELOPE Hollingsworth CNS (Sum) at [phone] with any questions or concerns.  Thank you for the opportunity to participate in Ms. Herr's care.     KASSI Yoder (Sum)  Dialysis Vascular Access/SOT Clinical Nurse Specialist    Solid Organ Transplant Service - [facility]   Phone # [phone]  Pager # [phone]    I have reviewed history, examined patient and discussed plan with the fellow/resident/OLVIN.  I concur with the findings in this note.       Patient was counseled on complications of incision and short and long term care to minimize infection/thrombosis risk.      TT: 15 min  CT: 10 min    .      Angelita Martin MD

## 2020-06-08 NOTE — PROGRESS NOTES
Patient needing labs pre visit with Dr. Basilio this week. Was seen in ED for 2 U of PRBC due to hemoglobin of 5.

## 2020-06-08 NOTE — PROGRESS NOTES
Dialysis Access Service   Progress Note    S:  Ms. Herr is being seen today for surgical followup of her dialysis access.  She reports no issues with the wound, and  no steal syndrome of the distal extremity. She reports LUE AV graft axillary incision wound drainage subsided and she finished Clindamycin 10 days doses. She had nose bleed 10 days ago and spoke with a [place] surgeon to hold Plavix and took   mg for  2 weeks. Nose bleed subsided at this time. She is ready for initiation of dialysis. Denies pain, swelling, redness or warm to touch around incision sites. No pain or swelling in left arm, tingling/numbness or a change of color/temperature in left hand and fingers. S/P Left Upper Extremity Brachial Artery to Axillary Vein Arteriovenous Bovine Graft on 5/7/2020.     O:  Temp:  [98  F (36.7  C)] 98  F (36.7  C)  Pulse:  [63] 63  BP: (160)/(65) 160/65  SpO2:  [98 %] 98 %  GENERAL: alert, cooperative  Circulation:   Radial pulse 3+  Ulnar pulse  3+   Capillary refill:  capillary refill < 2 sec    Sensory exam:   arm: Normal   [x]           Abnormal   []          Comment:    hand: Normal   [x]           Abnormal   []          Comment:   Motor exam:   arm: Normal   [x]           Abnormal   []          Comment:    hand: Normal   [x]           Abnormal   []          Comment:    Access: L upper extremity wound(s) healed. non-tender. No venous hypertension, ++ thrill but pulsatile. Left upper arm axillary incision has small opening gap, wound is clean and dry. No edema, redness or warm to touch around incision.    Assessment & Plan: Ms. Herr's dialysis access has matured well at this time point.    1. Apply steri strip to reinforce axillary incision site  2. May use LUE AV graft for dialysis as needed  3. Fistulogram of AV graft may be needed if there is any issues on dialysis  4. Follow up in clinic as needed     We would like to see the patient back in the clinic as needed to assess progress. The patient  was counselled to contact our nurse coordinator, PENELOPE Hollingsworth CNS (Sum) at [phone] with any questions or concerns.  Thank you for the opportunity to participate in Ms. Herr's care.     KASSI Yoder (Sum)  Dialysis Vascular Access/SOT Clinical Nurse Specialist    Solid Organ Transplant Service - [facility]   Phone # [phone]  Pager # [phone]    I have reviewed history, examined patient and discussed plan with the fellow/resident/OLVIN.  I concur with the findings in this note.       Patient was counseled on complications of incision and short and long term care to minimize infection/thrombosis risk.      TT: 15 min  CT: 10 min    .

## 2020-06-08 NOTE — TELEPHONE ENCOUNTER
DATE:  6/8/2020   TIME OF RECEIPT FROM LAB:  4:26p  LAB TEST:  hgb  LAB VALUE:  6.5  RESULTS GIVEN WITH READ-BACK TO (PROVIDER):  Dr. Basilio  TIME LAB VALUE REPORTED TO PROVIDER:   4:46p

## 2020-06-08 NOTE — PROGRESS NOTES
Chief Complaint   Patient presents with     RECHECK     Follow-up     Blood pressure (!) 160/65, pulse 63, temperature 98  F (36.7  C), temperature source Oral, SpO2 98 %, not currently breastfeeding.    Gay Rodas, CMA

## 2020-06-08 NOTE — TELEPHONE ENCOUNTER
Anemia Management Note  SUBJECTIVE/OBJECTIVE:  Referred by Ethel Guerrero NP on 2020  Primary Diagnosis: Anemia in Chronic Kidney Disease (N18.5, D63.1)     Secondary Diagnosis:  Chronic Kidney Disease, Stage 5 (N18.5)  Hgb goal range:  9-10   Epo/Darbo: Aranesp 40mcg every 14 days for Hgb <10. In Clinic  Iron regimen:  Ferrous Sulfate daily. (19)  Labs : 2020  Recent KELLIE use, transfusion, IV iron: Unknown  RX/TX plans : 10/06/2020  No history of stroke and blood clots.  Hx of HTN, CHF and Basal Cell Carcinoma ()        Contact:            Ok to leave message regarding Medical, Billing and Scheduling information per consent to communicate dated 10/19/2015                              OK to speak with Caroline Sandoval (daughter) and Caroline Baird (sister) regarding Medical, Billing and Scheduling information per consent to communicate dated 10/19/2015    Anemia Latest Ref Rng & Units 2020   KELLIE Dose - 40mcg - - - - - -   Hemoglobin 11.7 - 15.7 g/dL 9.4(L) 9.6(L) 7.5(L) 7.6(L) 8.0(L) 5.0(LL) 5.1(LL)   TSAT 15 - 46 % - - - - 26 - -   Ferritin 8 - 252 ng/mL - - - - 526(H) - -     BP Readings from Last 3 Encounters:   20 (!) 172/79   20 (!) 150/55   20 (!) 164/68     Wt Readings from Last 2 Encounters:   20 90.3 kg (199 lb)   20 90.3 kg (199 lb)       Per note on 20 Caroline states that Supriya did receive an Aranesp injection at clinic. Dose is not documented in Epic.  She received 1 unit PRBCs in the ED. Per ED notes, Supriya has been experieincing daily nose bleeds. Will monitor Hgb closely.     ASSESSMENT:  Hgb:Not at goal/Non-compliance or med error and Other: Nose Bleeds.   TSat: not at goal (>30%) but ferritin >500ng/mL.  IV iron not indicated at this time per anemia protocol. Ferritin: At goal (>100ng/mL)    PLAN:  Dose with aranesp and RTC for hgb then aranesp if needed in 2 week(s)    Orders needed  to be renewed (for next follow-up date) in [facility]: None    Iron labs due:  6/17/20    Plan discussed with:  NO call made today. Review chart.  Plan provided by:  mingo    NEXT FOLLOW-UP DATE:  6/19/20    Leslie Rivas RN   Anemia Services  [address] [address]   [email or web address]   Office : [phone]  Fax: [phone]

## 2020-06-10 ENCOUNTER — VIRTUAL VISIT (OUTPATIENT)
Dept: NEPHROLOGY | Facility: CLINIC | Age: 70
End: 2020-06-10
Attending: INTERNAL MEDICINE
Payer: MEDICARE

## 2020-06-10 DIAGNOSIS — N18.5 CKD (CHRONIC KIDNEY DISEASE) STAGE 5, GFR LESS THAN 15 ML/MIN (H): Primary | ICD-10-CM

## 2020-06-10 DIAGNOSIS — R80.1 PERSISTENT PROTEINURIA: ICD-10-CM

## 2020-06-10 DIAGNOSIS — I10 ESSENTIAL HYPERTENSION: ICD-10-CM

## 2020-06-10 ASSESSMENT — PAIN SCALES - GENERAL: PAINLEVEL: NO PAIN (0)

## 2020-06-10 NOTE — PROGRESS NOTES
"Supriya Herr is a 70 year old female who is being evaluated via a billable video visit.      The patient has been notified of following:     \"This video visit will be conducted via a call between you and your physician/provider. We have found that certain health care needs can be provided without the need for an in-person physical exam.  This service lets us provide the care you need with a video conversation.  If a prescription is necessary we can send it directly to your pharmacy.  If lab work is needed we can place an order for that and you can then stop by our lab to have the test done at a later time.    Video visits are billed at different rates depending on your insurance coverage.  Please reach out to your insurance provider with any questions.    If during the course of the call the physician/provider feels a video visit is not appropriate, you will not be charged for this service.\"    Patient has given verbal consent for Video visit? Yes    [facility]    Patient would like the video invitation sent by: Text to cell phone: [phone]    Will anyone else be joining your video visit? No      Video-Visit Details    Type of service:  Video Visit    Video Start Time: 1030  Video End Time: 1050    Originating Location (pt. Location): Home    Distant Location (provider location):  [facility] NEPHROLOGY     Platform used for Video Visit: [employer]    Kathryn Abundio Basilio MD        Assessment and Plan:     1. CKD5 w/proteinuria- Creat up to 7 with nose bleed and anemia, high BUn eGFR 5-6 ml/mn, UPCR 6.5 g/gCr (12/19).    - No significant uremic symptoms but given significant azotemia, will start dialysis in next couple days  - her volume and appetite are stable   - Etiology of CKD felt to be DM, HTN, Vascular dz,    - Remains Inactive on transplant wait list but working with transplant team to become active on the list.     - AVG was created on 5/7/20 by Dr Martin. Had " followup and OK to use    - Will start HD at ?[place] [facility]   -      2. Volume status - Caroline reports patient has no edema.      - Continue Lasix 80 mg bid    - Continue Na restricted diet      3. HTN - Home blood pressures 130-140/. No edema.  Generally takes her blood pressures upon awakening prior to AM meds  Current regimen:    - lasix 80 bid   - Coreg 25 mg bid   - Amlodipine 5 mg bid    - Continue home monitoring     4. Electrolytes - No acute concerns.      5. Acid base - Bicarb low   - Will be corrected on HD     6. BMD - Ca corrected 9.8, Phos much improved. Down to 6.2 from 7.0 last visit. Albumin 2.7   - Vit D 23,  (1/20)    - Continue Vit D 2000 U every day, but will hold Calcitriol given borderline hypercalcemia   - Continue Renvela 800 mg TID with meals. Admits that she wasn't taking regularly. Now being more careful so will hold off on increasing the dose. Will also see some improvement once she starts HD     7. Anemia - Hgb down to 7.6 post op from 10.0   - Iron studies 4/20: Ferritin 579, Fe 63, IS 29.    - Enrolled in anemia management. Given Darbo 40 mcg q 14 dys. Will be switching to Epo 5000 U IV q run   - On iron every day but will switch to Venofer 50 mg IV q week   - Colonoscopy 2018, tubular adenomas     8. DM2 - Resolution of hypoglycemia episodes with reduction in Lantus. A1c 5.2 %. On Insulin    - Continue Basaglar 18 unit(s) HS      9. Depression - Controlled on Zoloft 50 mg every day.       10. Disposition - No further clinic f/u required. Complete HD initiation.     Assessment and plan was discussed with patient and she voiced her understanding and agreement.     Reason for Visit:  CKD5/HTN f/u     HPI:  Ms Herr is a 69 yo female with CKD5 and nephrotic range proteinuria, DM2, HTN, present today for routine CKD f/u. Since I last saw her, she had her access placed, and GFR down to 5-6 in setting of hgb 5 due to acute nose bleeding. She got blood transfusion and went home. She is  ready to start dialysis. Her access has healed well enough, there was a small incisional drainage and steri strips were placed. Her access is able to be used.  She states other than mildly more fatigued, she feels OK otherwise.       Interval Hx:   Admission -20 for AVG surgery and ER visit for severe anemia , hgb 5, got transfused     ROS:   As in HPI  + fatigue  No SOB  No swelling  No headache  No nausea / vomiting  + nose bleeding stopped 10+ days ago    Chronic Health Problems:     CKD5  Proteinuria  AKA, left (10/18)  T2DM  Vit D def  HTN  HLD  Anxiety/depression  Vitiligo  Non proliferative diabetic retinopathy  BCC  JONE  Anemia of CKD  Prior tobacco abuse     Personal Hx:   Lives in lower [place] [facility], daughter Caroline upper level. [place]. Attends day program at [employer] Monday through Thursday    Family Hx:   Family History   Problem Relation Age of Onset     Diabetes Mother      Hypertension Mother      Heart Disease Mother          of congestive heart failure     Eye Disorder Mother      Arthritis Mother      Obesity Mother      Heart Disease Father          from CHF     Cerebrovascular Disease Father      Arthritis Father      Musculoskeletal Disorder Other         has MS     Thyroid Disease Other      Eye Disorder Other         cataracts     Cancer Other         throat/liver     Skin Cancer No family hx of      Melanoma No family hx of      Glaucoma No family hx of      Macular Degeneration No family hx of      Anesthesia Reaction No family hx of      Deep Vein Thrombosis No family hx of          Allergies:  Allergies   Allergen Reactions     Penicillins Rash     Unasyn Rash     No evidence SJS, but very uncomfortable and precipitated multiple provider visits. Would not use penicillins again if other options available.        Medications:  Current Outpatient Medications   Medication Sig     acetaminophen (TYLENOL) 325 MG tablet Take 2 tablets (650 mg) by mouth every 4 hours as needed for  mild pain (Patient taking differently: Take 650 mg by mouth every 4 hours as needed for mild pain (Pt has not taken in past 2 weeks 5/4/20) )     albuterol (PROAIR HFA/PROVENTIL HFA/VENTOLIN HFA) 108 (90 Base) MCG/ACT inhaler Inhale 2 puffs into the lungs every 6 hours as needed for shortness of breath / dyspnea or wheezing (Patient taking differently: Inhale 2 puffs into the lungs every 6 hours as needed for shortness of breath / dyspnea or wheezing (Pt has not used in past 2 weeks 5/4/20) )     amLODIPine (NORVASC) 5 MG tablet Take 1 tablet (5 mg) by mouth 2 times daily     apremilast (OTEZLA) 30 MG tablet Take 1 tablet (30 mg) by mouth daily (Patient taking differently: Take 30 mg by mouth every morning )     atorvastatin (LIPITOR) 20 MG tablet Take 1 tablet (20 mg) by mouth every evening (Patient taking differently: Take 20 mg by mouth every morning )     blood glucose (NO BRAND SPECIFIED) lancets standard Use to test blood sugar 3 to 4  times daily or as directed.     blood glucose (NO BRAND SPECIFIED) test strip Use to test blood sugar 3 to 4 times daily or as directed.     blood glucose (ONETOUCH ULTRA) test strip TEST YOUR BLOOD SUGAR 3-4 TIMES PER DAY.     blood glucose monitoring (NO BRAND SPECIFIED) meter device kit Use to test blood sugar 3 to 4 times daily or as directed.     carvedilol (COREG) 25 MG tablet Take 1 tablet (25 mg) by mouth 2 times daily (with meals)     Continuous Blood Gluc  (FREESTYLE PHOENIX READER) XX BELKYS 1 each See Admin Instructions Use per 's instructions to monitor glucose continuously.     Continuous Blood Gluc Sensor (FREESTYLE PHOENIX 14 DAY SENSOR) XX MISC 1 each every 14 days     desonide (DESOWEN) 0.05 % external ointment Apply topically as needed     ferrous sulfate (FEROSUL) 325 (65 Fe) MG tablet Take 1 tablet (325 mg) by mouth daily (with breakfast) Absorbed best on an empty stomach. If stomach upset occurs, can take with meals.     furosemide (LASIX) 80  "MG tablet Take 1 tablet (80 mg) by mouth 2 times daily     insulin aspart (NOVOLOG FLEXPEN) 100 UNIT/ML pen INJECT 4 UNITS SUBCUTANEOUSLY WITH BREAKFAST, LUNCH AND DINNER.     insulin glargine (BASAGLAR KWIKPEN) 100 UNIT/ML pen Inject 18 Units Subcutaneous At Bedtime     insulin pen needle (ULTICARE MINI) 31G X 6 MM Use daily or as directed.     sertraline (ZOLOFT) 50 MG tablet Take 1 tablet (50 mg) by mouth daily     sevelamer carbonate, RENVELA, (RENVELA) 0.8 GM PACK Packet Take 1 packet (0.8 g) by mouth 3 times daily (with meals)     vitamin D3 (CHOLECALCIFEROL) 2000 units (50 mcg) tablet Take 1 tablet (2,000 Units) by mouth daily     calcipotriene (DOVONOX) 0.005 % external ointment Use M-F in areas of psoriasis (Patient not taking: Reported on 6/8/2020)     clopidogrel (PLAVIX) 75 MG tablet Take 1 tablet (75 mg) by mouth daily (Patient not taking: Reported on 6/8/2020)     ondansetron (ZOFRAN) 4 MG tablet Take 1 tablet (4 mg) by mouth every 8 hours as needed for nausea (Patient not taking: Reported on 6/8/2020)     order for DME Compression socks - knee  15-20 mmHg  Open toed.  Use daily. (Patient not taking: Reported on 6/10/2020)     order for DME Equipment being ordered: mattress overlay for hospital bed  Wt. 192#  Height 5'5\"  99 months/Lifetime     order for DME 1 SAD light     order for DME 1 wheelchair     senna-docusate (SENOKOT-S/PERICOLACE) 8.6-50 MG tablet Take 1-2 tablets by mouth 2 times daily as needed for constipation (Patient not taking: Reported on 6/8/2020)     triamcinolone (KENALOG) 0.1 % external cream Apply to sites of dermatitis- the abdomen, armpits, groin as needed. (Patient not taking: Reported on 6/8/2020)     Current Facility-Administered Medications   Medication     bevacizumab (AVASTIN) intravitreal inj 1.25 mg      Vitals:  No vitals obtained    Exam:  General: alert, oriented  Neuro: speech normal  Psych: normal mentation and thought process      LABS:   CMP  Recent Labs   Lab " Test 06/08/20  1605 06/05/20  1307 06/05/20  1110 05/20/20  1252    139 139 140   POTASSIUM 4.4 4.8 4.4 4.5   CHLORIDE 114* 112* 112* 113*   CO2 17* 17* 18* 21   ANIONGAP 11 10 10 6   * 114* 90 107*   * 139* 137* 68*   CR 7.05* 7.34* 7.33* 5.59*   GFRESTIMATED 5* 5* 5* 7*   GFRESTBLACK 6* 6* 6* 8*   JODY 8.2* 8.1* 8.2* 8.8     Recent Labs   Lab Test 06/05/20  1307 01/17/20  1204 07/31/18  1148 07/24/18  0900  06/24/18  0623 06/23/18  2347 03/29/18  1410   BILITOTAL 0.1*  --   --   --   --  0.2 0.2 0.2   ALKPHOS 53  --   --   --   --  49 56 56   ALT 12 14  --   --   --  17 13 15   AST 6 6 24 24   < > 16 12 14    < > = values in this interval not displayed.     CBC  Recent Labs   Lab Test 06/08/20  1605 06/05/20  1307 06/05/20  1110 05/20/20  1252  05/08/20  0540 05/07/20  0607  02/28/20  1156   HGB 6.5* 5.1* 5.0* 8.0*   < > 7.5* 9.6*   < > 10.2*   WBC  --  5.9  --   --   --  6.9 6.6  --  6.1   RBC  --  1.71*  --   --   --  2.55* 3.34*  --  3.48*   HCT  --  16.6* 16.3* 25.9*  --  24.6* 31.0*   < > 33.0*   MCV  --  97  --   --   --  97 93  --  95   MCH  --  29.8  --   --   --  29.4 28.7  --  29.3   MCHC  --  30.7*  --   --   --  30.5* 31.0*  --  30.9*   RDW  --  14.2  --   --   --  14.0 13.5  --  13.9   PLT  --  168  --   --   --  172 183  --  197    < > = values in this interval not displayed.     URINE STUDIES  Recent Labs   Lab Test 03/29/18  1448 01/25/18  0233 11/02/17  0602 10/26/16  1220  12/05/12  1541   COLOR Straw Light Yellow Light Yellow Yellow   < > Yellow   APPEARANCE Clear Clear Clear Slightly Cloudy   < > Clear   URINEGLC 50* Negative 300* >500*   < > 100*   URINEBILI Negative Negative Negative Negative   < > Negative   URINEKETONE Negative Negative Negative Negative   < > Negative   SG 1.008 1.007 1.010 1.014   < > 1.025   UBLD Negative Negative Negative Negative   < > Small*   URINEPH 5.0 6.5 7.5* 6.0   < > 6.0   PROTEIN >499* 100* >600* >500*   < > 100*   UROBILINOGEN  --   --   --    --   --  0.2   NITRITE Negative Negative Negative Negative   < > Negative   LEUKEST Negative Negative Small* Small*   < > Negative   RBCU 1 0 1 5*   < >  --    WBCU 2 1 44* 54*   < >  --     < > = values in this interval not displayed.     Recent Labs   Lab Test 12/17/19  1013 05/24/19  1420 12/07/18  1417 11/01/18  1533   UTPG 6.51* 6.28* 5.60* 6.71*     PTH  Recent Labs   Lab Test 01/17/20  1204 12/17/19  1010 01/18/19  1457   PTHI 383* 350* 126*     IRON STUDIES  Recent Labs   Lab Test 05/20/20  1252 04/17/20  1118 03/13/20  1022   IRON 51 63 80   * 216* 210*   IRONSAT 26 29 38   ELENA 526* 579* 561*     Kathryn Abundio Basilio MD

## 2020-06-10 NOTE — LETTER
"6/10/2020       RE: Supriya Herr  [address]  [facility] [address]     Dear Colleague,    Thank you for referring your patient, Supriya Herr, to the [facility] NEPHROLOGY at [facility]. Please see a copy of my visit note below.    Pt complains of nausea and right ear pain.    Supriya Herr is a 70 year old female who is being evaluated via a billable video visit.      The patient has been notified of following:     \"This video visit will be conducted via a call between you and your physician/provider. We have found that certain health care needs can be provided without the need for an in-person physical exam.  This service lets us provide the care you need with a video conversation.  If a prescription is necessary we can send it directly to your pharmacy.  If lab work is needed we can place an order for that and you can then stop by our lab to have the test done at a later time.    Video visits are billed at different rates depending on your insurance coverage.  Please reach out to your insurance provider with any questions.    If during the course of the call the physician/provider feels a video visit is not appropriate, you will not be charged for this service.\"    Patient has given verbal consent for Video visit? Yes    How would you like to obtain your AVS? MyChart    Patient would like the video invitation sent by: Text to cell phone: [phone]    Will anyone else be joining your video visit? No      Video-Visit Details    Type of service:  Video Visit    Video Start Time: 1030  Video End Time: 1050    Originating Location (pt. Location): Home    Distant Location (provider location):  [facility] NEPHROLOGY     Platform used for Video Visit: Marisa Basilio MD        Assessment and Plan:     1. CKD5 w/proteinuria- Creat up to 7 with nose bleed and anemia, high BUn eGFR 5-6 ml/mn, UPCR 6.5 g/gCr (12/19).    - No significant uremic symptoms " but given significant azotemia, will start dialysis in next couple days  - her volume and appetite are stable   - Etiology of CKD felt to be DM, HTN, Vascular dz,    - Remains Inactive on transplant wait list but working with transplant team to become active on the list.     - AVG was created on 5/7/20 by Dr Martin. Had followup and OK to use    - Will start HD at ?[place] [facility]   -      2. Volume status - Caroline reports patient has no edema.      - Continue Lasix 80 mg bid    - Continue Na restricted diet      3. HTN - Home blood pressures 130-140/. No edema.  Generally takes her blood pressures upon awakening prior to AM meds  Current regimen:    - lasix 80 bid   - Coreg 25 mg bid   - Amlodipine 5 mg bid    - Continue home monitoring     4. Electrolytes - No acute concerns.      5. Acid base - Bicarb low   - Will be corrected on HD     6. BMD - Ca corrected 9.8, Phos much improved. Down to 6.2 from 7.0 last visit. Albumin 2.7   - Vit D 23,  (1/20)    - Continue Vit D 2000 U every day, but will hold Calcitriol given borderline hypercalcemia   - Continue Renvela 800 mg TID with meals. Admits that she wasn't taking regularly. Now being more careful so will hold off on increasing the dose. Will also see some improvement once she starts HD     7. Anemia - Hgb down to 7.6 post op from 10.0   - Iron studies 4/20: Ferritin 579, Fe 63, IS 29.    - Enrolled in anemia management. Given Darbo 40 mcg q 14 dys. Will be switching to Epo 5000 U IV q run   - On iron every day but will switch to Venofer 50 mg IV q week   - Colonoscopy 2018, tubular adenomas     8. DM2 - Resolution of hypoglycemia episodes with reduction in Lantus. A1c 5.2 %. On Insulin    - Continue Basaglar 18 unit(s) HS      9. Depression - Controlled on Zoloft 50 mg every day.       10. Disposition - No further clinic f/u required. Complete HD initiation.     Assessment and plan was discussed with patient and she voiced her understanding and  agreement.     Reason for Visit:  CKD5/HTN f/u     HPI:  Ms Herr is a 69 yo female with CKD5 and nephrotic range proteinuria, DM2, HTN, present today for routine CKD f/u. Since I last saw her, she had her access placed, and GFR down to 5-6 in setting of hgb 5 due to acute nose bleeding. She got blood transfusion and went home. She is ready to start dialysis. Her access has healed well enough, there was a small incisional drainage and steri strips were placed. Her access is able to be used.  She states other than mildly more fatigued, she feels OK otherwise.       Interval Hx:   Admission -20 for AVG surgery and ER visit for severe anemia , hgb 5, got transfused     ROS:   As in HPI  + fatigue  No SOB  No swelling  No headache  No nausea / vomiting  + nose bleeding stopped 10+ days ago    Chronic Health Problems:     CKD5  Proteinuria  AKA, left (10/18)  T2DM  Vit D def  HTN  HLD  Anxiety/depression  Vitiligo  Non proliferative diabetic retinopathy  BCC  JONE  Anemia of CKD  Prior tobacco abuse     Personal Hx:   Lives in [facility], daughter Caroline [facility]. [place]. Attends day program at [employer] Monday through Thursday    Family Hx:   Family History   Problem Relation Age of Onset     Diabetes Mother      Hypertension Mother      Heart Disease Mother          of congestive heart failure     Eye Disorder Mother      Arthritis Mother      Obesity Mother      Heart Disease Father          from CHF     Cerebrovascular Disease Father      Arthritis Father      Musculoskeletal Disorder Other         has MS     Thyroid Disease Other      Eye Disorder Other         cataracts     Cancer Other         throat/liver     Skin Cancer No family hx of      Melanoma No family hx of      Glaucoma No family hx of      Macular Degeneration No family hx of      Anesthesia Reaction No family hx of      Deep Vein Thrombosis No family hx of          Allergies:  Allergies   Allergen Reactions     Penicillins Rash      Unasyn Rash     No evidence SJS, but very uncomfortable and precipitated multiple provider visits. Would not use penicillins again if other options available.        Medications:  Current Outpatient Medications   Medication Sig     acetaminophen (TYLENOL) 325 MG tablet Take 2 tablets (650 mg) by mouth every 4 hours as needed for mild pain (Patient taking differently: Take 650 mg by mouth every 4 hours as needed for mild pain (Pt has not taken in past 2 weeks 5/4/20) )     albuterol (PROAIR HFA/PROVENTIL HFA/VENTOLIN HFA) 108 (90 Base) MCG/ACT inhaler Inhale 2 puffs into the lungs every 6 hours as needed for shortness of breath / dyspnea or wheezing (Patient taking differently: Inhale 2 puffs into the lungs every 6 hours as needed for shortness of breath / dyspnea or wheezing (Pt has not used in past 2 weeks 5/4/20) )     amLODIPine (NORVASC) 5 MG tablet Take 1 tablet (5 mg) by mouth 2 times daily     apremilast (OTEZLA) 30 MG tablet Take 1 tablet (30 mg) by mouth daily (Patient taking differently: Take 30 mg by mouth every morning )     atorvastatin (LIPITOR) 20 MG tablet Take 1 tablet (20 mg) by mouth every evening (Patient taking differently: Take 20 mg by mouth every morning )     blood glucose (NO BRAND SPECIFIED) lancets standard Use to test blood sugar 3 to 4  times daily or as directed.     blood glucose (NO BRAND SPECIFIED) test strip Use to test blood sugar 3 to 4 times daily or as directed.     blood glucose (ONETOUCH ULTRA) test strip TEST YOUR BLOOD SUGAR 3-4 TIMES PER DAY.     blood glucose monitoring (NO BRAND SPECIFIED) meter device kit Use to test blood sugar 3 to 4 times daily or as directed.     carvedilol (COREG) 25 MG tablet Take 1 tablet (25 mg) by mouth 2 times daily (with meals)     Continuous Blood Gluc  (FREESTYLE [place] READER) XX BELKYS 1 each See Admin Instructions Use per 's instructions to monitor glucose continuously.     Continuous Blood Gluc Sensor (FREESTYLE  "[place] 14 DAY SENSOR) XX MISC 1 each every 14 days     desonide (DESOWEN) 0.05 % external ointment Apply topically as needed     ferrous sulfate (FEROSUL) 325 (65 Fe) MG tablet Take 1 tablet (325 mg) by mouth daily (with breakfast) Absorbed best on an empty stomach. If stomach upset occurs, can take with meals.     furosemide (LASIX) 80 MG tablet Take 1 tablet (80 mg) by mouth 2 times daily     insulin aspart (NOVOLOG FLEXPEN) 100 UNIT/ML pen INJECT 4 UNITS SUBCUTANEOUSLY WITH BREAKFAST, LUNCH AND DINNER.     insulin glargine (BASAGLAR KWIKPEN) 100 UNIT/ML pen Inject 18 Units Subcutaneous At Bedtime     insulin pen needle (ULTICARE MINI) 31G X 6 MM Use daily or as directed.     sertraline (ZOLOFT) 50 MG tablet Take 1 tablet (50 mg) by mouth daily     sevelamer carbonate, RENVELA, (RENVELA) 0.8 GM PACK Packet Take 1 packet (0.8 g) by mouth 3 times daily (with meals)     vitamin D3 (CHOLECALCIFEROL) 2000 units (50 mcg) tablet Take 1 tablet (2,000 Units) by mouth daily     calcipotriene (DOVONOX) 0.005 % external ointment Use M-F in areas of psoriasis (Patient not taking: Reported on 6/8/2020)     clopidogrel (PLAVIX) 75 MG tablet Take 1 tablet (75 mg) by mouth daily (Patient not taking: Reported on 6/8/2020)     ondansetron (ZOFRAN) 4 MG tablet Take 1 tablet (4 mg) by mouth every 8 hours as needed for nausea (Patient not taking: Reported on 6/8/2020)     order for DME Compression socks - knee  15-20 mmHg  Open toed.  Use daily. (Patient not taking: Reported on 6/10/2020)     order for DME Equipment being ordered: mattress overlay for hospital bed  Wt. 192#  Height 5'5\"  99 months/Lifetime     order for DME 1 SAD light     order for DME 1 wheelchair     senna-docusate (SENOKOT-S/PERICOLACE) 8.6-50 MG tablet Take 1-2 tablets by mouth 2 times daily as needed for constipation (Patient not taking: Reported on 6/8/2020)     triamcinolone (KENALOG) 0.1 % external cream Apply to sites of dermatitis- the abdomen, armpits, groin " as needed. (Patient not taking: Reported on 6/8/2020)     Current Facility-Administered Medications   Medication     bevacizumab (AVASTIN) intravitreal inj 1.25 mg      Vitals:  No vitals obtained    Exam:  General: alert, oriented  Neuro: speech normal  Psych: normal mentation and thought process      LABS:   CMP  Recent Labs   Lab Test 06/08/20  1605 06/05/20  1307 06/05/20  1110 05/20/20  1252    139 139 140   POTASSIUM 4.4 4.8 4.4 4.5   CHLORIDE 114* 112* 112* 113*   CO2 17* 17* 18* 21   ANIONGAP 11 10 10 6   * 114* 90 107*   * 139* 137* 68*   CR 7.05* 7.34* 7.33* 5.59*   GFRESTIMATED 5* 5* 5* 7*   GFRESTBLACK 6* 6* 6* 8*   JODY 8.2* 8.1* 8.2* 8.8     Recent Labs   Lab Test 06/05/20  1307 01/17/20  1204 07/31/18  1148 07/24/18  0900  06/24/18  0623 06/23/18  2347 03/29/18  1410   BILITOTAL 0.1*  --   --   --   --  0.2 0.2 0.2   ALKPHOS 53  --   --   --   --  49 56 56   ALT 12 14  --   --   --  17 13 15   AST 6 6 24 24   < > 16 12 14    < > = values in this interval not displayed.     CBC  Recent Labs   Lab Test 06/08/20  1605 06/05/20  1307 06/05/20  1110 05/20/20  1252  05/08/20  0540 05/07/20  0607  02/28/20  1156   HGB 6.5* 5.1* 5.0* 8.0*   < > 7.5* 9.6*   < > 10.2*   WBC  --  5.9  --   --   --  6.9 6.6  --  6.1   RBC  --  1.71*  --   --   --  2.55* 3.34*  --  3.48*   HCT  --  16.6* 16.3* 25.9*  --  24.6* 31.0*   < > 33.0*   MCV  --  97  --   --   --  97 93  --  95   MCH  --  29.8  --   --   --  29.4 28.7  --  29.3   MCHC  --  30.7*  --   --   --  30.5* 31.0*  --  30.9*   RDW  --  14.2  --   --   --  14.0 13.5  --  13.9   PLT  --  168  --   --   --  172 183  --  197    < > = values in this interval not displayed.     URINE STUDIES  Recent Labs   Lab Test 03/29/18  1448 01/25/18  0233 11/02/17  0602 10/26/16  1220  12/05/12  1541   COLOR Straw Light Yellow Light Yellow Yellow   < > Yellow   APPEARANCE Clear Clear Clear Slightly Cloudy   < > Clear   URINEGLC 50* Negative 300* >500*   < > 100*    URINEBILI Negative Negative Negative Negative   < > Negative   URINEKETONE Negative Negative Negative Negative   < > Negative   SG 1.008 1.007 1.010 1.014   < > 1.025   UBLD Negative Negative Negative Negative   < > Small*   URINEPH 5.0 6.5 7.5* 6.0   < > 6.0   PROTEIN >499* 100* >600* >500*   < > 100*   UROBILINOGEN  --   --   --   --   --  0.2   NITRITE Negative Negative Negative Negative   < > Negative   LEUKEST Negative Negative Small* Small*   < > Negative   RBCU 1 0 1 5*   < >  --    WBCU 2 1 44* 54*   < >  --     < > = values in this interval not displayed.     Recent Labs   Lab Test 12/17/19  1013 05/24/19  1420 12/07/18  1417 11/01/18  1533   UTPG 6.51* 6.28* 5.60* 6.71*     PTH  Recent Labs   Lab Test 01/17/20  1204 12/17/19  1010 01/18/19  1457   PTHI 383* 350* 126*     IRON STUDIES  Recent Labs   Lab Test 05/20/20  1252 04/17/20  1118 03/13/20  1022   IRON 51 63 80   * 216* 210*   IRONSAT 26 29 38   ELENA 526* 579* 561*     Kathryn Abundio Basilio MD              Again, thank you for allowing me to participate in the care of your patient.      Sincerely,    Kathryn Basilio MD

## 2020-06-11 ENCOUNTER — OFFICE VISIT (OUTPATIENT)
Dept: AUDIOLOGY | Facility: CLINIC | Age: 70
End: 2020-06-11
Payer: MEDICARE

## 2020-06-11 ENCOUNTER — CARE COORDINATION (OUTPATIENT)
Dept: NEPHROLOGY | Facility: CLINIC | Age: 70
End: 2020-06-11

## 2020-06-11 ENCOUNTER — OFFICE VISIT (OUTPATIENT)
Dept: OTOLARYNGOLOGY | Facility: CLINIC | Age: 70
End: 2020-06-11
Payer: MEDICARE

## 2020-06-11 VITALS — RESPIRATION RATE: 16 BRPM | HEIGHT: 66 IN | BODY MASS INDEX: 32.12 KG/M2

## 2020-06-11 DIAGNOSIS — H90.A22 SENSORINEURAL HEARING LOSS (SNHL) OF LEFT EAR WITH RESTRICTED HEARING OF RIGHT EAR: ICD-10-CM

## 2020-06-11 DIAGNOSIS — H90.A31 MIXED CONDUCTIVE AND SENSORINEURAL HEARING LOSS OF RIGHT EAR WITH RESTRICTED HEARING OF LEFT EAR: Primary | ICD-10-CM

## 2020-06-11 DIAGNOSIS — H90.3 SENSORINEURAL HEARING LOSS (SNHL) OF BOTH EARS: ICD-10-CM

## 2020-06-11 DIAGNOSIS — H90.8 MIXED HEARING LOSS, UNILATERAL: Primary | ICD-10-CM

## 2020-06-11 DIAGNOSIS — H69.91 DISORDER OF RIGHT EUSTACHIAN TUBE: ICD-10-CM

## 2020-06-11 DIAGNOSIS — R04.0 EPISTAXIS: ICD-10-CM

## 2020-06-11 DIAGNOSIS — H65.91 OME (OTITIS MEDIA WITH EFFUSION), RIGHT: ICD-10-CM

## 2020-06-11 DIAGNOSIS — H93.11 RIGHT-SIDED TINNITUS: ICD-10-CM

## 2020-06-11 PROCEDURE — 92550 TYMPANOMETRY & REFLEX THRESH: CPT | Performed by: AUDIOLOGIST

## 2020-06-11 PROCEDURE — 99203 OFFICE O/P NEW LOW 30 MIN: CPT | Performed by: OTOLARYNGOLOGY

## 2020-06-11 PROCEDURE — 92557 COMPREHENSIVE HEARING TEST: CPT | Performed by: AUDIOLOGIST

## 2020-06-11 PROCEDURE — 99207 ZZC NO CHARGE LOS: CPT | Performed by: AUDIOLOGIST

## 2020-06-11 ASSESSMENT — PAIN SCALES - GENERAL: PAINLEVEL: NO PAIN (0)

## 2020-06-11 NOTE — PATIENT INSTRUCTIONS
General Scheduling Information  To schedule your CT/MRI scan, please contact [place] [facility] at [phone]   [address] [place] [address]    To schedule your Surgery, please contact our Specialty Schedulers at [phone]    ENT Clinic Locations Clinic Hours Telephone Number     [facility]  [address] [place] [address]   Tuesday:       8:00am -- 4:00pm    Wednesday:  8:00am - 4:00pm   To schedule an appointment with   Dr. Zuñiga,   please contact our   Specialty Scheduling Department at:     [phone]       [facility]  [address] [place] [address]   Friday:          8:00am - 4:00pm         Urgent Care Locations Clinic Hours Telephone Numbers     [facility] Corey  [address] [place] [address]     Monday-Friday:     11:00pm - 9:00pm    Saturday-Sunday:  9:00am - 5:00pm   [phone]     [facility] Martinez  [address] [place] [address]     Monday-Friday:      5:00pm - 9:00pm     Saturday-Sunday:  9:00am - 5:00pm   [phone]

## 2020-06-11 NOTE — PROGRESS NOTES
I am seeing this patient in consultation for right tinnitus at the request of the provider Dr. Live Deluca.      Chief Complaint - Tinnitus, hearing loss    History of Present Illness - Supriya Herr is a 70 year old female who presents to me today with ringing and hearing loss in the right ear. She hears pulse in the right ear  It has been present and noticeable for approximately many months. The patient has noticed increased difficulty hearing certain sounds and difficulty in understanding others per daughter. Overall she feels hearing is okay. There is no history of chronic ear disease or ear surgery.     She also recently had epistaxis. She attributed this to a blood thinner that is now discontinued. No epistaxis since 2-3 weeks ago. Her hgb was recently 5.1. She was transfused and was at 6.5 3 days ago. she is on dialysis for ESRD.     Past Medical History -   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)     Contact dermatitis and other eczema, due to unspecified cause     Dermatophytosis of nail     Generalized osteoarthrosis, unspecified site     Hypertension goal BP (blood pressure) < 140/90     Moderate nonproliferative diabetic retinopathy associated with type 2 diabetes mellitus (H)     Proteinuria     Stage I pressure ulcer     Hyperlipidemia LDL goal <100     Non compliance with medical treatment     Incontinence of urine     Basal cell carcinoma     Senile nuclear sclerosis     JONE (obstructive sleep apnea)     CHF (congestive heart failure) (H)     Health Care Home     Type 2 diabetes mellitus with diabetic chronic kidney disease (H)     Moderate recurrent major depression (H)     Type 2 diabetes mellitus with diabetic neuropathy, with long-term current use of insulin (H)     Macular cyst, hole, or pseudohole of retina     Traumatic amputation of left lower extremity above knee, subsequent encounter     Former tobacco use     Type 2  diabetes mellitus (H)     Dyslipidemia     Anemia in chronic kidney disease     CKD (chronic kidney disease) stage 5, GFR less than 15 ml/min (H)     Obesity (BMI 30-39.9)     Anxiety and depression     Cervical cancer screening     Diabetic foot infection (H)     Plantar ulcer of right foot with fat layer exposed (H)     Cellulitis     Intertrigo     Drug rash     Wheelchair bound     Combined forms of age-related cataract of right eye     Pseudophakia of left eye     Anemia, iron deficiency     Chronic kidney disease, stage 5, kidney failure (H)     Encounter regarding vascular access for dialysis for ESRD (H)     Postoperative nausea       Current Medications -   Current Outpatient Medications:      acetaminophen (TYLENOL) 325 MG tablet, Take 2 tablets (650 mg) by mouth every 4 hours as needed for mild pain (Patient taking differently: Take 650 mg by mouth every 4 hours as needed for mild pain (Pt has not taken in past 2 weeks 5/4/20) ), Disp: 100 tablet, Rfl: 0     albuterol (PROAIR HFA/PROVENTIL HFA/VENTOLIN HFA) 108 (90 Base) MCG/ACT inhaler, Inhale 2 puffs into the lungs every 6 hours as needed for shortness of breath / dyspnea or wheezing (Patient taking differently: Inhale 2 puffs into the lungs every 6 hours as needed for shortness of breath / dyspnea or wheezing (Pt has not used in past 2 weeks 5/4/20) ), Disp: 3 Inhaler, Rfl: 1     amLODIPine (NORVASC) 5 MG tablet, Take 1 tablet (5 mg) by mouth 2 times daily, Disp: 180 tablet, Rfl: 3     apremilast (OTEZLA) 30 MG tablet, Take 1 tablet (30 mg) by mouth daily (Patient taking differently: Take 30 mg by mouth every morning ), Disp: 90 tablet, Rfl: 3     atorvastatin (LIPITOR) 20 MG tablet, Take 1 tablet (20 mg) by mouth every evening (Patient taking differently: Take 20 mg by mouth every morning ), Disp: 90 tablet, Rfl: 2     blood glucose (NO BRAND SPECIFIED) lancets standard, Use to test blood sugar 3 to 4  times daily or as directed., Disp: 400 each, Rfl:  3     blood glucose (NO BRAND SPECIFIED) test strip, Use to test blood sugar 3 to 4 times daily or as directed., Disp: 400 strip, Rfl: 2     blood glucose (ONETOUCH ULTRA) test strip, TEST YOUR BLOOD SUGAR 3-4 TIMES PER DAY., Disp: 400 strip, Rfl: 1     blood glucose monitoring (NO BRAND SPECIFIED) meter device kit, Use to test blood sugar 3 to 4 times daily or as directed., Disp: 1 kit, Rfl: 0     calcipotriene (DOVONOX) 0.005 % external ointment, Use M-F in areas of psoriasis (Patient not taking: Reported on 6/8/2020), Disp: 90 g, Rfl: 3     carvedilol (COREG) 25 MG tablet, Take 1 tablet (25 mg) by mouth 2 times daily (with meals), Disp: 180 tablet, Rfl: 3     clopidogrel (PLAVIX) 75 MG tablet, Take 1 tablet (75 mg) by mouth daily (Patient not taking: Reported on 6/8/2020), Disp: 30 tablet, Rfl: 0     Continuous Blood Gluc  (FREESTYLE [place]IX READER) XX [facility], 1 each See Admin Instructions Use per 's instructions to monitor glucose continuously., Disp: 1 Device, Rfl: 0     Continuous Blood Gluc Sensor (FREESTYLE [place] 14 DAY SENSOR) XX MISC, 1 each every 14 days, Disp: 6 each, Rfl: 3     desonide (DESOWEN) 0.05 % external ointment, Apply topically as needed, Disp: , Rfl:      ferrous sulfate (FEROSUL) 325 (65 Fe) MG tablet, Take 1 tablet (325 mg) by mouth daily (with breakfast) Absorbed best on an empty stomach. If stomach upset occurs, can take with meals., Disp: 90 tablet, Rfl: 3     furosemide (LASIX) 80 MG tablet, Take 1 tablet (80 mg) by mouth 2 times daily, Disp: 180 tablet, Rfl: 3     insulin aspart (NOVOLOG FLEXPEN) 100 UNIT/ML pen, INJECT 4 UNITS SUBCUTANEOUSLY WITH BREAKFAST, LUNCH AND DINNER., Disp: 15 mL, Rfl: 3     insulin glargine (BASAGLAR KWIKPEN) 100 UNIT/ML pen, Inject 18 Units Subcutaneous At Bedtime, Disp: 2 mL, Rfl: 3     insulin pen needle (ULTICARE MINI) 31G X 6 MM, Use daily or as directed., Disp: 100 each, Rfl: 1     ondansetron (ZOFRAN) 4 MG tablet, Take 1 tablet (4 mg)  "by mouth every 8 hours as needed for nausea (Patient not taking: Reported on 6/8/2020), Disp: 9 tablet, Rfl: 0     order for DME, Compression socks - knee 15-20 mmHg Open toed. Use daily. (Patient not taking: Reported on 6/10/2020), Disp: 1 Units, Rfl: 0     order for DME, Equipment being ordered: mattress overlay for hospital bed Wt. 192# Height 5'5\" 99 months/Lifetime, Disp: 1 Units, Rfl: 0     order for DME, 1 SAD light, Disp: 1 Device, Rfl: 1     order for DME, 1 wheelchair, Disp: 1 Device, Rfl: 0     senna-docusate (SENOKOT-S/PERICOLACE) 8.6-50 MG tablet, Take 1-2 tablets by mouth 2 times daily as needed for constipation (Patient not taking: Reported on 6/8/2020), Disp: 30 tablet, Rfl: 0     sertraline (ZOLOFT) 50 MG tablet, Take 1 tablet (50 mg) by mouth daily, Disp: 90 tablet, Rfl: 3     sevelamer carbonate, RENVELA, (RENVELA) 0.8 GM PACK Packet, Take 1 packet (0.8 g) by mouth 3 times daily (with meals), Disp: 90 packet, Rfl: 3     triamcinolone (KENALOG) 0.1 % external cream, Apply to sites of dermatitis- the abdomen, armpits, groin as needed. (Patient not taking: Reported on 6/8/2020), Disp: 30 g, Rfl: 0     vitamin D3 (CHOLECALCIFEROL) 2000 units (50 mcg) tablet, Take 1 tablet (2,000 Units) by mouth daily, Disp: 100 tablet, Rfl: 3    Current Facility-Administered Medications:      bevacizumab (AVASTIN) intravitreal inj 1.25 mg, 1.25 mg, Intravitreal, Q28 Days, Leanne Jett MD    Allergies -   Allergies   Allergen Reactions     Penicillins Rash     Unasyn Rash     No evidence SJS, but very uncomfortable and precipitated multiple provider visits. Would not use penicillins again if other options available.        Social History -   Social History     Socioeconomic History     Marital status:      Spouse name: Not on file     Number of children: 1     Years of education: Not on file     Highest education level: Not on file   Occupational History     Employer: DISABLED   Social Needs     " Financial resource strain: Not on file     Food insecurity     Worry: Not on file     Inability: Not on file     Transportation needs     Medical: Not on file     Non-medical: Not on file   Tobacco Use     Smoking status: Former Smoker     Packs/day: 0.50     Years: 52.00     Pack years: 26.00     Types: Cigarettes     Start date: 1964     Last attempt to quit: 2017     Years since quittin.6     Smokeless tobacco: Never Used     Tobacco comment: 1 per day or less   Substance and Sexual Activity     Alcohol use: No     Drug use: No     Sexual activity: Never     Partners: Male   Lifestyle     Physical activity     Days per week: Not on file     Minutes per session: Not on file     Stress: Not on file   Relationships     Social connections     Talks on phone: Not on file     Gets together: Not on file     Attends Tenriism service: Not on file     Active member of club or organization: Not on file     Attends meetings of clubs or organizations: Not on file     Relationship status: Not on file     Intimate partner violence     Fear of current or ex partner: Not on file     Emotionally abused: Not on file     Physically abused: Not on file     Forced sexual activity: Not on file   Other Topics Concern     Parent/sibling w/ CABG, MI or angioplasty before 65F 55M? No   Social History Narrative    Lives with daughter in [place] in the [place] level.  Has a five year old grandson.        Family History -   Family History   Problem Relation Age of Onset     Diabetes Mother      Hypertension Mother      Heart Disease Mother          of congestive heart failure     Eye Disorder Mother      Arthritis Mother      Obesity Mother      Heart Disease Father          from CHF     Cerebrovascular Disease Father      Arthritis Father      Musculoskeletal Disorder Other         has MS     Thyroid Disease Other      Eye Disorder Other         cataracts     Cancer Other         throat/liver     Skin Cancer No family hx  "of      Melanoma No family hx of      Glaucoma No family hx of      Macular Degeneration No family hx of      Anesthesia Reaction No family hx of      Deep Vein Thrombosis No family hx of        Review of Systems - As per HPI and PMHx, otherwise 10+ system review is negative.    Physical Exam  Resp 16   Ht 1.676 m (5' 6\")   BMI 32.12 kg/m    General - The patient is in no distress. Alert and oriented to person and place, answers questions and cooperates with examination appropriately.   Neurologic - CN II-XII are grossly intact. No focal neurologic deficits.   Voice and Breathing - The patient was breathing comfortably without the use of accessory muscles. There was no wheezing, stridor, or stertor.  The patients voice was clear and strong.  Ears - Right tympanic membrane intact. She has a right middle ear effusion, no acute infection. There are some bubbles. She can valsalva and more bubbles can be seen. No acute infection. The left tympanic membrane intact, no effusion.   Eyes - Extraocular movements intact, and the pupils were reactive to light.  Sclera were not icteric or injected, conjunctiva were pink and moist.  Nose - septum deviated to the left, some dryness. No significant blood, maybe some dried old blood on the right head of inferior turbinate. Left airway more narrow. Some dried mucous.  Neck - Soft, non-tender. Palpation of the occipital, submental, submandibular, internal jugular chain, and supraclavicular nodes did not demonstrate any abnormal lymph nodes or masses. No parotid masses. Palpation of the thyroid was soft and smooth, with no nodules or goiter appreciated.  The trachea was mobile and midline.      Audiologic Studies - An audiogram and tympanogram were performed today as part of the evaluation and personally reviewed. The tympanogram shows a normal Type B curve right ear, type A left. She has a mixed hearing loss right and a down-sloping sensorineural hearing loss both ears. "       Assessment and Plan - Supriya Herr is a 70 year old female who presents to me today with a right otitis media with effusion and bilateral down-sloping sensorineural hearing loss. I offered myringotomy, but she deferred. She can get bubbles on valsalva. She can try that multiple times a day and see if this resolves. If not, return for myringotomy with tube. She can also try hearing aids. Her epistaxis is resolved, but if this reoccurs she can return for possible nasal cautery.       Jean Paul Zuñiga MD  Otolaryngology  [facility]

## 2020-06-11 NOTE — LETTER
6/11/2020         RE: Supriya Herr  [address]  [facility] [address]        Dear Colleague,    Thank you for referring your patient, Supriya Herr, to the [facility]. Please see a copy of my visit note below.    I am seeing this patient in consultation for right tinnitus at the request of the provider Dr. Live Deluca.      Chief Complaint - Tinnitus, hearing loss    History of Present Illness - Supriya Herr is a 70 year old female who presents to me today with ringing and hearing loss in the right ear. She hears pulse in the right ear  It has been present and noticeable for approximately many months. The patient has noticed increased difficulty hearing certain sounds and difficulty in understanding others per daughter. Overall she feels hearing is okay. There is no history of chronic ear disease or ear surgery.     She also recently had epistaxis. She attributed this to a blood thinner that is now discontinued. No epistaxis since 2-3 weeks ago. Her hgb was recently 5.1. She was transfused and was at 6.5 3 days ago. she is on dialysis for ESRD.     Past Medical History -   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)     Contact dermatitis and other eczema, due to unspecified cause     Dermatophytosis of nail     Generalized osteoarthrosis, unspecified site     Hypertension goal BP (blood pressure) < 140/90     Moderate nonproliferative diabetic retinopathy associated with type 2 diabetes mellitus (H)     Proteinuria     Stage I pressure ulcer     Hyperlipidemia LDL goal <100     Non compliance with medical treatment     Incontinence of urine     Basal cell carcinoma     Senile nuclear sclerosis     JONE (obstructive sleep apnea)     CHF (congestive heart failure) (H)     Health Care Home     Type 2 diabetes mellitus with diabetic chronic kidney disease (H)     Moderate recurrent major depression (H)     Type 2  diabetes mellitus with diabetic neuropathy, with long-term current use of insulin (H)     Macular cyst, hole, or pseudohole of retina     Traumatic amputation of left lower extremity above knee, subsequent encounter     Former tobacco use     Type 2 diabetes mellitus (H)     Dyslipidemia     Anemia in chronic kidney disease     CKD (chronic kidney disease) stage 5, GFR less than 15 ml/min (H)     Obesity (BMI 30-39.9)     Anxiety and depression     Cervical cancer screening     Diabetic foot infection (H)     Plantar ulcer of right foot with fat layer exposed (H)     Cellulitis     Intertrigo     Drug rash     Wheelchair bound     Combined forms of age-related cataract of right eye     Pseudophakia of left eye     Anemia, iron deficiency     Chronic kidney disease, stage 5, kidney failure (H)     Encounter regarding vascular access for dialysis for ESRD (H)     Postoperative nausea       Current Medications -   Current Outpatient Medications:      acetaminophen (TYLENOL) 325 MG tablet, Take 2 tablets (650 mg) by mouth every 4 hours as needed for mild pain (Patient taking differently: Take 650 mg by mouth every 4 hours as needed for mild pain (Pt has not taken in past 2 weeks 5/4/20) ), Disp: 100 tablet, Rfl: 0     albuterol (PROAIR HFA/PROVENTIL HFA/VENTOLIN HFA) 108 (90 Base) MCG/ACT inhaler, Inhale 2 puffs into the lungs every 6 hours as needed for shortness of breath / dyspnea or wheezing (Patient taking differently: Inhale 2 puffs into the lungs every 6 hours as needed for shortness of breath / dyspnea or wheezing (Pt has not used in past 2 weeks 5/4/20) ), Disp: 3 Inhaler, Rfl: 1     amLODIPine (NORVASC) 5 MG tablet, Take 1 tablet (5 mg) by mouth 2 times daily, Disp: 180 tablet, Rfl: 3     apremilast (OTEZLA) 30 MG tablet, Take 1 tablet (30 mg) by mouth daily (Patient taking differently: Take 30 mg by mouth every morning ), Disp: 90 tablet, Rfl: 3     atorvastatin (LIPITOR) 20 MG tablet, Take 1 tablet (20 mg)  by mouth every evening (Patient taking differently: Take 20 mg by mouth every morning ), Disp: 90 tablet, Rfl: 2     blood glucose (NO BRAND SPECIFIED) lancets standard, Use to test blood sugar 3 to 4  times daily or as directed., Disp: 400 each, Rfl: 3     blood glucose (NO BRAND SPECIFIED) test strip, Use to test blood sugar 3 to 4 times daily or as directed., Disp: 400 strip, Rfl: 2     blood glucose (ONETOUCH ULTRA) test strip, TEST YOUR BLOOD SUGAR 3-4 TIMES PER DAY., Disp: 400 strip, Rfl: 1     blood glucose monitoring (NO BRAND SPECIFIED) meter device kit, Use to test blood sugar 3 to 4 times daily or as directed., Disp: 1 kit, Rfl: 0     calcipotriene (DOVONOX) 0.005 % external ointment, Use M-F in areas of psoriasis (Patient not taking: Reported on 6/8/2020), Disp: 90 g, Rfl: 3     carvedilol (COREG) 25 MG tablet, Take 1 tablet (25 mg) by mouth 2 times daily (with meals), Disp: 180 tablet, Rfl: 3     clopidogrel (PLAVIX) 75 MG tablet, Take 1 tablet (75 mg) by mouth daily (Patient not taking: Reported on 6/8/2020), Disp: 30 tablet, Rfl: 0     Continuous Blood Gluc  (FREESTYLE PHOENIX READER) XX [facility], 1 each See Admin Instructions Use per 's instructions to monitor glucose continuously., Disp: 1 Device, Rfl: 0     Continuous Blood Gluc Sensor (FREESTYLE PHOENIX 14 DAY SENSOR) XX MISC, 1 each every 14 days, Disp: 6 each, Rfl: 3     desonide (DESOWEN) 0.05 % external ointment, Apply topically as needed, Disp: , Rfl:      ferrous sulfate (FEROSUL) 325 (65 Fe) MG tablet, Take 1 tablet (325 mg) by mouth daily (with breakfast) Absorbed best on an empty stomach. If stomach upset occurs, can take with meals., Disp: 90 tablet, Rfl: 3     furosemide (LASIX) 80 MG tablet, Take 1 tablet (80 mg) by mouth 2 times daily, Disp: 180 tablet, Rfl: 3     insulin aspart (NOVOLOG FLEXPEN) 100 UNIT/ML pen, INJECT 4 UNITS SUBCUTANEOUSLY WITH BREAKFAST, LUNCH AND DINNER., Disp: 15 mL, Rfl: 3     insulin glargine  "(BASAGLAR KWIKPEN) 100 UNIT/ML pen, Inject 18 Units Subcutaneous At Bedtime, Disp: 2 mL, Rfl: 3     insulin pen needle (ULTICARE MINI) 31G X 6 MM, Use daily or as directed., Disp: 100 each, Rfl: 1     ondansetron (ZOFRAN) 4 MG tablet, Take 1 tablet (4 mg) by mouth every 8 hours as needed for nausea (Patient not taking: Reported on 6/8/2020), Disp: 9 tablet, Rfl: 0     order for DME, Compression socks - knee 15-20 mmHg Open toed. Use daily. (Patient not taking: Reported on 6/10/2020), Disp: 1 Units, Rfl: 0     order for DME, Equipment being ordered: mattress overlay for hospital bed Wt. 192# Height 5'5\" 99 months/Lifetime, Disp: 1 Units, Rfl: 0     order for DME, 1 SAD light, Disp: 1 Device, Rfl: 1     order for DME, 1 wheelchair, Disp: 1 Device, Rfl: 0     senna-docusate (SENOKOT-S/PERICOLACE) 8.6-50 MG tablet, Take 1-2 tablets by mouth 2 times daily as needed for constipation (Patient not taking: Reported on 6/8/2020), Disp: 30 tablet, Rfl: 0     sertraline (ZOLOFT) 50 MG tablet, Take 1 tablet (50 mg) by mouth daily, Disp: 90 tablet, Rfl: 3     sevelamer carbonate, RENVELA, (RENVELA) 0.8 GM PACK Packet, Take 1 packet (0.8 g) by mouth 3 times daily (with meals), Disp: 90 packet, Rfl: 3     triamcinolone (KENALOG) 0.1 % external cream, Apply to sites of dermatitis- the abdomen, armpits, groin as needed. (Patient not taking: Reported on 6/8/2020), Disp: 30 g, Rfl: 0     vitamin D3 (CHOLECALCIFEROL) 2000 units (50 mcg) tablet, Take 1 tablet (2,000 Units) by mouth daily, Disp: 100 tablet, Rfl: 3    Current Facility-Administered Medications:      bevacizumab (AVASTIN) intravitreal inj 1.25 mg, 1.25 mg, Intravitreal, Q28 Days, Leanne Jett MD    Allergies -   Allergies   Allergen Reactions     Penicillins Rash     Unasyn Rash     No evidence SJS, but very uncomfortable and precipitated multiple provider visits. Would not use penicillins again if other options available.        Social History -   Social History "     Socioeconomic History     Marital status:      Spouse name: Not on file     Number of children: 1     Years of education: Not on file     Highest education level: Not on file   Occupational History     Employer: DISABLED   Social Needs     Financial resource strain: Not on file     Food insecurity     Worry: Not on file     Inability: Not on file     Transportation needs     Medical: Not on file     Non-medical: Not on file   Tobacco Use     Smoking status: Former Smoker     Packs/day: 0.50     Years: 52.00     Pack years: 26.00     Types: Cigarettes     Start date: 1964     Last attempt to quit: 2017     Years since quittin.6     Smokeless tobacco: Never Used     Tobacco comment: 1 per day or less   Substance and Sexual Activity     Alcohol use: No     Drug use: No     Sexual activity: Never     Partners: Male   Lifestyle     Physical activity     Days per week: Not on file     Minutes per session: Not on file     Stress: Not on file   Relationships     Social connections     Talks on phone: Not on file     Gets together: Not on file     Attends Buddhism service: Not on file     Active member of club or organization: Not on file     Attends meetings of clubs or organizations: Not on file     Relationship status: Not on file     Intimate partner violence     Fear of current or ex partner: Not on file     Emotionally abused: Not on file     Physically abused: Not on file     Forced sexual activity: Not on file   Other Topics Concern     Parent/sibling w/ CABG, MI or angioplasty before 65F 55M? No   Social History Narrative    Lives with daughter in [place] in the [place].  Has a five year old grandson.        Family History -   Family History   Problem Relation Age of Onset     Diabetes Mother      Hypertension Mother      Heart Disease Mother          of congestive heart failure     Eye Disorder Mother      Arthritis Mother      Obesity Mother      Heart Disease Father           "from CHF     Cerebrovascular Disease Father      Arthritis Father      Musculoskeletal Disorder Other         has MS     Thyroid Disease Other      Eye Disorder Other         cataracts     Cancer Other         throat/liver     Skin Cancer No family hx of      Melanoma No family hx of      Glaucoma No family hx of      Macular Degeneration No family hx of      Anesthesia Reaction No family hx of      Deep Vein Thrombosis No family hx of        Review of Systems - As per HPI and PMHx, otherwise 10+ system review is negative.    Physical Exam  Resp 16   Ht 1.676 m (5' 6\")   BMI 32.12 kg/m    General - The patient is in no distress. Alert and oriented to person and place, answers questions and cooperates with examination appropriately.   Neurologic - CN II-XII are grossly intact. No focal neurologic deficits.   Voice and Breathing - The patient was breathing comfortably without the use of accessory muscles. There was no wheezing, stridor, or stertor.  The patients voice was clear and strong.  Ears - Right tympanic membrane intact. She has a right middle ear effusion, no acute infection. There are some bubbles. She can valsalva and more bubbles can be seen. No acute infection. The left tympanic membrane intact, no effusion.   Eyes - Extraocular movements intact, and the pupils were reactive to light.  Sclera were not icteric or injected, conjunctiva were pink and moist.  Nose - septum deviated to the left, some dryness. No significant blood, maybe some dried old blood on the right head of inferior turbinate. Left airway more narrow. Some dried mucous.  Neck - Soft, non-tender. Palpation of the occipital, submental, submandibular, internal jugular chain, and supraclavicular nodes did not demonstrate any abnormal lymph nodes or masses. No parotid masses. Palpation of the thyroid was soft and smooth, with no nodules or goiter appreciated.  The trachea was mobile and midline.      Audiologic Studies - An audiogram and " tympanogram were performed today as part of the evaluation and personally reviewed. The tympanogram shows a normal Type B curve right ear, type A left. She has a mixed hearing loss right and a down-sloping sensorineural hearing loss both ears.       Assessment and Plan - Supriya Herr is a 70 year old female who presents to me today with a right otitis media with effusion and bilateral down-sloping sensorineural hearing loss. I offered myringotomy, but she deferred. She can get bubbles on valsalva. She can try that multiple times a day and see if this resolves. If not, return for myringotomy with tube. She can also try hearing aids. Her epistaxis is resolved, but if this reoccurs she can return for possible nasal cautery.       Jean Paul Zuñiga MD  Otolaryngology  [facility]        Again, thank you for allowing me to participate in the care of your patient.        Sincerely,        Jean Paul Zuñiga MD

## 2020-06-11 NOTE — PROGRESS NOTES
AUDIOLOGY REPORT:    Patient was referred to [facility] from ENT by Dr. Zuñiga for a hearing examination. Patient reports decline in hearing in her right ear as well as a pulsatile tinnitus (which may be in time with her heartbeat) beginning 2-4 weeks ago. They were accompanied today by their daughter.    Testing:    Otoscopy:   Otoscopic exam indicates ears are clear of cerumen bilaterally     Tympanograms:    RIGHT: restricted eardrum mobility      LEFT:   normal eardrum mobility    Reflexes (reported by stimulus ear): 1000 Hz  RIGHT: Ipsilateral is elevated  RIGHT: Contralateral is absent at frequencies tested  LEFT:   Ipsilateral is absent at frequencies tested  LEFT:   Contralateral is absent at frequencies tested    Thresholds:   Pure Tone Thresholds assessed using conventional audiometry with good reliability from 250-8000 Hz bilaterally using insert earphones and circumaural headphones     RIGHT:  mild and moderate-severe mixed hearing loss    LEFT:    normal hearing sensitivity through 2000 Hz then a mild to moderately severe sensorineural hearing loss    Speech Reception Threshold:    RIGHT: 30 dB HL    LEFT:   25 dB HL    Word Recognition Score:     RIGHT: 92% at 70 dB HL using NU-6 recorded word list.    LEFT:   96% at 65 dB HL using NU-6 recorded word list.    Discussed results with the patient.     Patient was returned to ENT for follow up.     Lul Morillo CCC-A  Licensed Audiologist  6/11/2020

## 2020-06-11 NOTE — PROGRESS NOTES
[facility] Manager, Dar, called [facility] to inform her a later chairtime opened up for Supriya on T, Th,Sa schedule.     Supriya will start dialysis there T, Th, Sa at 11:30 as of this coming Saturday. Daughter, Caroline, notified and approves of this time and very happy about the time and location.     Dar will contact admissions to notify them of the change, patient has already been approved there.

## 2020-06-18 ENCOUNTER — MYC REFILL (OUTPATIENT)
Dept: NEPHROLOGY | Facility: CLINIC | Age: 70
End: 2020-06-18

## 2020-06-18 ENCOUNTER — TELEPHONE (OUTPATIENT)
Dept: PHARMACY | Facility: CLINIC | Age: 70
End: 2020-06-18

## 2020-06-18 DIAGNOSIS — E83.39 HYPERPHOSPHATEMIA: ICD-10-CM

## 2020-06-18 NOTE — TELEPHONE ENCOUNTER
Referred by Ethel Guerrero NP on 09/16/2020    Started Dialysis on 6/13/20 at [facility].    Anemia Services will be closed.     Anemia Latest Ref Rng & Units 5/8/2020 5/8/2020 5/20/2020 6/5/2020 6/5/2020 6/5/2020 6/8/2020   KELLIE Dose - - - - 40mcg - - -   Hemoglobin 11.7 - 15.7 g/dL 7.5(L) 7.6(L) 8.0(L) - 5.0(LL) 5.1(LL) 6.5(LL)   TSAT 15 - 46 % - - 26 - - - -   Ferritin 8 - 252 ng/mL - - 526(H) - - - -   PRBCs - - - - 1 unit  - - -       Anemia labs discontinued, therapy plans cancelled, removed from active patient list, and referring provider is aware.    Leslie Rivas RN   Anemia Services  [address] [address]   [email or web address]   Office : [phone]  Fax: [phone]

## 2020-06-21 RX ORDER — SEVELAMER CARBONATE FOR ORAL SUSPENSION 800 MG/1
0.8 POWDER, FOR SUSPENSION ORAL
Qty: 90 PACKET | Refills: 3 | Status: CANCELLED | OUTPATIENT
Start: 2020-06-21

## 2020-06-22 NOTE — TELEPHONE ENCOUNTER
Caroline was informed that Supriya should be getting these refills from her dialysis nephrologist. She states that she tried reaching them on Saturday, but they were unaware of this prescription (recently started dialysis).    She ended up getting this refilled through a [facility] pharmacy due to a shortage of this medication, however, will follow up with her dialysis center for further refills.

## 2020-06-29 DIAGNOSIS — T82.898A PROBLEM WITH DIALYSIS ACCESS, INITIAL ENCOUNTER (H): Primary | ICD-10-CM

## 2020-06-30 DIAGNOSIS — T82.898A PROBLEM WITH DIALYSIS ACCESS, INITIAL ENCOUNTER (H): Primary | ICD-10-CM

## 2020-06-30 DIAGNOSIS — Z11.59 ENCOUNTER FOR SCREENING FOR OTHER VIRAL DISEASES: Primary | ICD-10-CM

## 2020-07-01 ENCOUNTER — TELEPHONE (OUTPATIENT)
Dept: NEPHROLOGY | Facility: CLINIC | Age: 70
End: 2020-07-01

## 2020-07-01 NOTE — TELEPHONE ENCOUNTER
Caroline, patient's daughter, had called to discuss her mother's upcoming procedure in IR. She had concerns about this, as her mother is a poor historian and could not explain to her what her dialysis team at [facility] had shared with Supriya.     [facility] was in communication with Dr. Basilio, the dialysis physician overseeing Supriya's care. Dr. Basilio reports that she needs the fistulogram for narrowing/turbulence, and that the machine at dialysis detects recirculating.    Called Caroline back this AM to discuss this. She was very appreciative of the information and will contact Supriya's family practitioner for insulin instructions for her upcoming IR procedure.

## 2020-07-06 ENCOUNTER — HOSPITAL ENCOUNTER (OUTPATIENT)
Dept: ULTRASOUND IMAGING | Facility: CLINIC | Age: 70
Discharge: HOME | End: 2020-07-06
Attending: NURSE PRACTITIONER | Admitting: NURSE PRACTITIONER
Payer: MEDICARE

## 2020-07-06 DIAGNOSIS — T82.898A PROBLEM WITH DIALYSIS ACCESS, INITIAL ENCOUNTER (H): ICD-10-CM

## 2020-07-06 LAB — RADIOLOGIST FLAGS: ABNORMAL

## 2020-07-06 PROCEDURE — 93990 DOPPLER FLOW TESTING: CPT

## 2020-07-08 ENCOUNTER — MYC REFILL (OUTPATIENT)
Dept: FAMILY MEDICINE | Facility: CLINIC | Age: 70
End: 2020-07-08

## 2020-07-08 ENCOUNTER — MYC REFILL (OUTPATIENT)
Dept: ENDOCRINOLOGY | Facility: CLINIC | Age: 70
End: 2020-07-08

## 2020-07-08 DIAGNOSIS — F41.1 GAD (GENERALIZED ANXIETY DISORDER): ICD-10-CM

## 2020-07-08 DIAGNOSIS — Z79.4 TYPE 2 DIABETES MELLITUS WITH HYPERGLYCEMIA, WITH LONG-TERM CURRENT USE OF INSULIN (H): ICD-10-CM

## 2020-07-08 DIAGNOSIS — E11.65 TYPE 2 DIABETES MELLITUS WITH HYPERGLYCEMIA, WITH LONG-TERM CURRENT USE OF INSULIN (H): ICD-10-CM

## 2020-07-08 NOTE — TELEPHONE ENCOUNTER
Last Clinic Visit: 2/14/20 with recommended 4 month follow up, no visits scheduled.  Both prescriptions last authorized 5/22/20, however, pharmacy sent to is temporarily closed, Resubmitting same prescriptions to requested pharmacy

## 2020-07-09 ENCOUNTER — TELEPHONE (OUTPATIENT)
Dept: ENDOCRINOLOGY | Facility: CLINIC | Age: 70
End: 2020-07-09

## 2020-07-09 DIAGNOSIS — E11.40 TYPE 2 DIABETES MELLITUS WITH DIABETIC NEUROPATHY, WITH LONG-TERM CURRENT USE OF INSULIN (H): ICD-10-CM

## 2020-07-09 DIAGNOSIS — E83.39 HYPERPHOSPHATEMIA: Primary | ICD-10-CM

## 2020-07-09 DIAGNOSIS — Z79.4 TYPE 2 DIABETES MELLITUS WITH DIABETIC NEUROPATHY, WITH LONG-TERM CURRENT USE OF INSULIN (H): ICD-10-CM

## 2020-07-09 RX ORDER — SEVELAMER HYDROCHLORIDE 800 MG/1
800 TABLET, FILM COATED ORAL
Qty: 270 TABLET | Refills: 3 | Status: SHIPPED | OUTPATIENT
Start: 2020-07-09 | End: 2020-09-02

## 2020-07-09 NOTE — TELEPHONE ENCOUNTER
Called pt - pt has upcoming procedure    Pt on basglar at 22 at bedtime and novolog at 4 units with meals    Pending surgery - pt to take basglar at 20 units at bedtime

## 2020-07-09 NOTE — TELEPHONE ENCOUNTER
Response sent to patient via [employer].     Ember Shah RN   [facility]

## 2020-07-09 NOTE — TELEPHONE ENCOUNTER
[facility] Call Center    Phone Message    May a detailed message be left on voicemail: yes , Per Patient daughter is wanting to get a call back after 3, today. 7/9/2020    Reason for Call: Medication Question or concern regarding medication   Prescription Clarification  Name of Medication: insulin aspart (NOVOLOG FLEXPEN) 100 UNIT/ML pen   Prescribing Provider: Chow    Pharmacy: n/a   What on the order needs clarification? Per Patients daughter is wanting to get a call back to get clarification on the dosage for Patient before surgery. Patients daughter states last surgery Patient was given 14 and Patients daughter states provider is wanting to give Patient 22 before surgery. Patient daughter states this surgery is in the middle of the day and is concerrned about the dosage.  please advise.             Action Taken: Message routed to:  [facility] Endo    Travel Screening: Not Applicable

## 2020-07-10 ENCOUNTER — RESULTS ONLY (OUTPATIENT)
Dept: LAB | Age: 70
End: 2020-07-10

## 2020-07-10 ENCOUNTER — TELEPHONE (OUTPATIENT)
Dept: INTERVENTIONAL RADIOLOGY/VASCULAR | Facility: CLINIC | Age: 70
End: 2020-07-10

## 2020-07-10 DIAGNOSIS — Z11.59 ENCOUNTER FOR SCREENING FOR OTHER VIRAL DISEASES: ICD-10-CM

## 2020-07-10 PROCEDURE — 99207 ZZC NO BILLABLE SERVICE THIS VISIT: CPT

## 2020-07-10 PROCEDURE — U0003 INFECTIOUS AGENT DETECTION BY NUCLEIC ACID (DNA OR RNA); SEVERE ACUTE RESPIRATORY SYNDROME CORONAVIRUS 2 (SARS-COV-2) (CORONAVIRUS DISEASE [COVID-19]), AMPLIFIED PROBE TECHNIQUE, MAKING USE OF HIGH THROUGHPUT TECHNOLOGIES AS DESCRIBED BY CMS-2020-01-R: HCPCS | Performed by: NURSE PRACTITIONER

## 2020-07-10 RX ORDER — INSULIN GLARGINE 100 [IU]/ML
18 INJECTION, SOLUTION SUBCUTANEOUS AT BEDTIME
Qty: 2 ML | Refills: 3
Start: 2020-07-10 | End: 2021-02-08

## 2020-07-10 NOTE — TELEPHONE ENCOUNTER
Program below is ok for glargine at 14 units the night prior to surgery (pt normally on 18 units) - results relayed to daughter who verbalized understanding.

## 2020-07-10 NOTE — TELEPHONE ENCOUNTER
Caroline Sandoval [phone]     Her nephrologist changed the dose to basaglar 18 units  insulin glargine (BASAGLAR KWIKPEN) 100 UNIT/ML pen  2 mL  3  4/1/2020   No    Sig - Route: Inject 18 Units Subcutaneous At Bedtime - Subcutaneous      Asking On-call to please re- review dosage from upcoming surgery.   Suzanne Valentine RN on 7/10/2020 at 9:20 AM       RE    Conversation   (Newest Message First)   Jalyn Madrigal MD           7/9/20 1:42 PM   Note      Called pt - pt has upcoming procedure     Pt on basglar at 22 at bedtime and novolog at 4 units with meals     Pending surgery - pt to take basglar at 20 units at bedtime          And      Reason for Call: Medication Question or concern regarding medication   Prescription Clarification  Name of Medication: insulin aspart (NOVOLOG FLEXPEN) 100 UNIT/ML pen   Prescribing Provider: Chow               Pharmacy: n/a              What on the order needs clarification? Per Patients daughter is wanting to get a call back to get clarification on the dosage for Patient before surgery. Patients daughter states last surgery Patient was given 14 and Patients daughter states provider is wanting to give Patient 22 before surgery. Patient daughter states this surgery is in the middle of the day and is concerrned about the dosage.  please advise.

## 2020-07-11 LAB
SARS-COV-2 RNA SPEC QL NAA+PROBE: NOT DETECTED
SPECIMEN SOURCE: NORMAL

## 2020-07-13 ENCOUNTER — APPOINTMENT (OUTPATIENT)
Dept: MEDSURG UNIT | Facility: CLINIC | Age: 70
End: 2020-07-13
Attending: INTERNAL MEDICINE
Payer: MEDICARE

## 2020-07-13 ENCOUNTER — APPOINTMENT (OUTPATIENT)
Dept: INTERVENTIONAL RADIOLOGY/VASCULAR | Facility: CLINIC | Age: 70
End: 2020-07-13
Attending: NURSE PRACTITIONER
Payer: MEDICARE

## 2020-07-13 ENCOUNTER — HOSPITAL ENCOUNTER (OUTPATIENT)
Facility: CLINIC | Age: 70
Discharge: HOME | End: 2020-07-13
Attending: INTERNAL MEDICINE | Admitting: RADIOLOGY
Payer: MEDICARE

## 2020-07-13 VITALS
SYSTOLIC BLOOD PRESSURE: 120 MMHG | DIASTOLIC BLOOD PRESSURE: 78 MMHG | RESPIRATION RATE: 18 BRPM | HEART RATE: 78 BPM | TEMPERATURE: 98.5 F | OXYGEN SATURATION: 98 %

## 2020-07-13 DIAGNOSIS — T82.898A PROBLEM WITH DIALYSIS ACCESS, INITIAL ENCOUNTER (H): ICD-10-CM

## 2020-07-13 LAB
ANION GAP SERPL CALCULATED.3IONS-SCNC: 5 MMOL/L (ref 3–14)
APTT PPP: 32 SEC (ref 22–37)
BUN SERPL-MCNC: 46 MG/DL (ref 7–30)
CALCIUM SERPL-MCNC: 9 MG/DL (ref 8.5–10.1)
CHLORIDE SERPL-SCNC: 109 MMOL/L (ref 94–109)
CO2 SERPL-SCNC: 27 MMOL/L (ref 20–32)
CREAT SERPL-MCNC: 5.58 MG/DL (ref 0.52–1.04)
ERYTHROCYTE [DISTWIDTH] IN BLOOD BY AUTOMATED COUNT: 14.5 % (ref 10–15)
GFR SERPL CREATININE-BSD FRML MDRD: 7 ML/MIN/{1.73_M2}
GLUCOSE SERPL-MCNC: 115 MG/DL (ref 70–99)
HCT VFR BLD AUTO: 29.8 % (ref 35–47)
HGB BLD-MCNC: 9 G/DL (ref 11.7–15.7)
INR PPP: 1.11 (ref 0.86–1.14)
MCH RBC QN AUTO: 30.8 PG (ref 26.5–33)
MCHC RBC AUTO-ENTMCNC: 30.2 G/DL (ref 31.5–36.5)
MCV RBC AUTO: 102 FL (ref 78–100)
PLATELET # BLD AUTO: 253 10E9/L (ref 150–450)
POTASSIUM SERPL-SCNC: 4 MMOL/L (ref 3.4–5.3)
RBC # BLD AUTO: 2.92 10E12/L (ref 3.8–5.2)
SODIUM SERPL-SCNC: 141 MMOL/L (ref 133–144)
WBC # BLD AUTO: 9 10E9/L (ref 4–11)

## 2020-07-13 PROCEDURE — 27210732 ZZH ACCESSORY CR1

## 2020-07-13 PROCEDURE — C1725 CATH, TRANSLUMIN NON-LASER: HCPCS

## 2020-07-13 PROCEDURE — 25500064 ZZH RX 255 OP 636: Performed by: INTERNAL MEDICINE

## 2020-07-13 PROCEDURE — 99152 MOD SED SAME PHYS/QHP 5/>YRS: CPT

## 2020-07-13 PROCEDURE — 40000556 ZZH STATISTIC PERIPHERAL IV START W US GUIDANCE

## 2020-07-13 PROCEDURE — 85027 COMPLETE CBC AUTOMATED: CPT | Performed by: RADIOLOGY

## 2020-07-13 PROCEDURE — 27210845 ZZH DEVICE INFLATION CR5

## 2020-07-13 PROCEDURE — 27210888 ZZH ACCESSORY CR7

## 2020-07-13 PROCEDURE — 25000128 H RX IP 250 OP 636: Performed by: STUDENT IN AN ORGANIZED HEALTH CARE EDUCATION/TRAINING PROGRAM

## 2020-07-13 PROCEDURE — 36902 INTRO CATH DIALYSIS CIRCUIT: CPT

## 2020-07-13 PROCEDURE — 25000125 ZZHC RX 250: Performed by: STUDENT IN AN ORGANIZED HEALTH CARE EDUCATION/TRAINING PROGRAM

## 2020-07-13 PROCEDURE — C1769 GUIDE WIRE: HCPCS

## 2020-07-13 PROCEDURE — 27211134 ZZH SHEATH CR2

## 2020-07-13 PROCEDURE — C1887 CATHETER, GUIDING: HCPCS

## 2020-07-13 PROCEDURE — 40000166 ZZH STATISTIC PP CARE STAGE 1

## 2020-07-13 PROCEDURE — 85610 PROTHROMBIN TIME: CPT | Mod: GZ | Performed by: RADIOLOGY

## 2020-07-13 PROCEDURE — 99153 MOD SED SAME PHYS/QHP EA: CPT

## 2020-07-13 PROCEDURE — 80048 BASIC METABOLIC PNL TOTAL CA: CPT | Performed by: RADIOLOGY

## 2020-07-13 PROCEDURE — 85730 THROMBOPLASTIN TIME PARTIAL: CPT | Performed by: RADIOLOGY

## 2020-07-13 PROCEDURE — 25800030 ZZH RX IP 258 OP 636: Performed by: PHYSICIAN ASSISTANT

## 2020-07-13 RX ORDER — NICOTINE POLACRILEX 4 MG
15-30 LOZENGE BUCCAL
Status: DISCONTINUED | OUTPATIENT
Start: 2020-07-13 | End: 2020-07-13 | Stop reason: HOSPADM

## 2020-07-13 RX ORDER — DEXTROSE MONOHYDRATE 25 G/50ML
25-50 INJECTION, SOLUTION INTRAVENOUS
Status: DISCONTINUED | OUTPATIENT
Start: 2020-07-13 | End: 2020-07-13 | Stop reason: HOSPADM

## 2020-07-13 RX ORDER — IODIXANOL 320 MG/ML
100 INJECTION, SOLUTION INTRAVASCULAR ONCE
Status: COMPLETED | OUTPATIENT
Start: 2020-07-13 | End: 2020-07-13

## 2020-07-13 RX ORDER — HEPARIN SODIUM 200 [USP'U]/100ML
1 INJECTION, SOLUTION INTRAVENOUS CONTINUOUS PRN
Status: DISCONTINUED | OUTPATIENT
Start: 2020-07-13 | End: 2020-07-13 | Stop reason: HOSPADM

## 2020-07-13 RX ORDER — FLUMAZENIL 0.1 MG/ML
0.2 INJECTION, SOLUTION INTRAVENOUS
Status: DISCONTINUED | OUTPATIENT
Start: 2020-07-13 | End: 2020-07-13 | Stop reason: HOSPADM

## 2020-07-13 RX ORDER — NALOXONE HYDROCHLORIDE 0.4 MG/ML
.1-.4 INJECTION, SOLUTION INTRAMUSCULAR; INTRAVENOUS; SUBCUTANEOUS
Status: DISCONTINUED | OUTPATIENT
Start: 2020-07-13 | End: 2020-07-13 | Stop reason: HOSPADM

## 2020-07-13 RX ORDER — LIDOCAINE 40 MG/G
CREAM TOPICAL
Status: DISCONTINUED | OUTPATIENT
Start: 2020-07-13 | End: 2020-07-13 | Stop reason: HOSPADM

## 2020-07-13 RX ORDER — FENTANYL CITRATE 50 UG/ML
25-50 INJECTION, SOLUTION INTRAMUSCULAR; INTRAVENOUS EVERY 5 MIN PRN
Status: DISCONTINUED | OUTPATIENT
Start: 2020-07-13 | End: 2020-07-13 | Stop reason: HOSPADM

## 2020-07-13 RX ORDER — SODIUM CHLORIDE 9 MG/ML
INJECTION, SOLUTION INTRAVENOUS CONTINUOUS
Status: DISCONTINUED | OUTPATIENT
Start: 2020-07-13 | End: 2020-07-13 | Stop reason: HOSPADM

## 2020-07-13 RX ADMIN — SODIUM CHLORIDE: 9 INJECTION, SOLUTION INTRAVENOUS at 11:55

## 2020-07-13 RX ADMIN — HEPARIN SODIUM 1 BAG: 200 INJECTION, SOLUTION INTRAVENOUS at 14:22

## 2020-07-13 RX ADMIN — IODIXANOL 75 ML: 320 INJECTION, SOLUTION INTRAVASCULAR at 15:07

## 2020-07-13 RX ADMIN — MIDAZOLAM 4 MG: 1 INJECTION INTRAMUSCULAR; INTRAVENOUS at 14:59

## 2020-07-13 RX ADMIN — LIDOCAINE HYDROCHLORIDE 3 ML: 10 INJECTION, SOLUTION EPIDURAL; INFILTRATION; INTRACAUDAL; PERINEURAL at 15:00

## 2020-07-13 RX ADMIN — FENTANYL CITRATE 200 MCG: 50 INJECTION, SOLUTION INTRAMUSCULAR; INTRAVENOUS at 14:59

## 2020-07-13 ASSESSMENT — PAIN DESCRIPTION - DESCRIPTORS: DESCRIPTORS: CONSTANT;DISCOMFORT;PRESSURE

## 2020-07-13 NOTE — DISCHARGE INSTRUCTIONS
[facility]      Interventional Radiology  Discharge Instructions Following Fistulogram      ? You may resume normal activities as tolerated, but avoid any strenuous activity or heavy lifting (>10 lbs.) involving your access arm.    ? Elevate and rest your arm as much as possible for the first 24 hours after the procedure.  This will promote healing and reduce swelling.     ? If bleeding or oozing should occur, apply fingertip pressure to puncture site to control bleeding, but avoid excessive pressure as this may clot off the fistula.  Hold light pressure for 10 minutes, or until bleeding/oozing is controlled.  If excessive bleeding is noted or if you are having difficulty controlling the bleeding with direct pressure, call 911.     ? If you develop fever, chills, excessive pain/tenderness or drainage at the puncture site, or have questions call your Doctor or Dialysis Center.     ? If you received sedation for your procedure: An adult should stay with you for 24 hours, Do Not drive a motor vehicle, operate machinery, or drink alcoholic beverages for 24 hours.     ? You may resume your normal medications immediately    ? If you notice sudden loss of pulse or thrill (buzzing feeling) in your fistula, contact your Doctor or Dialysis unit immediately.     Additional Instructions      [facility] INTERVENTIONAL RADIOLOGY DEPARTMENT  Procedure Physician:                                                Date of procedure: July 13, 2020  Telephone Numbers: [phone] Monday-Friday 8:00 am to 4:30 pm  [phone] After 4:30 pm Monday-Friday, Weekends & Holidays.   Ask for the Interventional Radiologist on call.  Someone is on call 24 hrs/day  [facility] toll free number: [phone] Monday-Friday 8:00 am to 4:30 pm  [facility] Emergency Dept: [phone]

## 2020-07-13 NOTE — PROGRESS NOTES
Prep complete for Left Fistulogram. Daughter Caroline will be picking up patient post procedure cell# [phone].

## 2020-07-13 NOTE — IP AVS SNAPSHOT
[facility], [place], Interventional Radiology  [address] [address]  Phone:  [phone]                                    After Visit Summary   7/13/2020    Supriya Herr    MRN: [id number]           After Visit Summary Signature Page    I have received my discharge instructions, and my questions have been answered. I have discussed any challenges I see with this plan with the nurse or doctor.    ..........................................................................................................................................  Patient/Patient Representative Signature      ..........................................................................................................................................  Patient Representative Print Name and Relationship to Patient    ..................................................               ................................................  Date                                   Time    ..........................................................................................................................................  Reviewed by Signature/Title    ...................................................              ..............................................  Date                                               Time          22EPIC Rev 08/18

## 2020-07-13 NOTE — PROCEDURES
[facility], [place]    Procedure: IR left fistulogram and angioplasty    Date/Time: 7/13/2020 3:12 PM  Performed by: Frankie Cohen MD  Authorized by: Frankie Cohen MD   IR Fellow Physician: Frankie Cohen    UNIVERSAL PROTOCOL   Site Marked: Yes  Prior Images Obtained and Reviewed:  Yes  Required items: Required blood products, implants, devices and special equipment available    Patient identity confirmed:  Verbally with patient, arm band, provided demographic data and hospital-assigned identification number  Patient was reevaluated immediately before administering moderate or deep sedation or anesthesia  Confirmation Checklist:  Patient's identity using two indicators, relevant allergies, procedure was appropriate and matched the consent or emergent situation and correct equipment/implants were available  Time out: Immediately prior to the procedure a time out was called    Universal Protocol: the Joint Commission Universal Protocol was followed    Preparation: Patient was prepped and draped in usual sterile fashion    ESBL (mL):  5         ANESTHESIA    Anesthesia: Local infiltration  Local Anesthetic:  Lidocaine 1% without epinephrine  Anesthetic Total (mL):  3      SEDATION    Patient Sedated: Yes    Sedation Type:  Moderate (conscious) sedation  Sedation:  Fentanyl and midazolam  Vital signs: Vital signs monitored during sedation    See dictated procedure note for full details.  Findings: Severe stenosis at the Venous anastomosis --> Balloon plasty performed with resultant good flow.    Specimens: none    Complications: None    Condition: Stable    PROCEDURE   Patient Tolerance:  Patient tolerated the procedure well with no immediate complications    Length of time physician/provider present for 1:1 monitoring during sedation: 60

## 2020-07-13 NOTE — PRE-PROCEDURE
GENERAL PRE-PROCEDURE:   Procedure:  Left Fistulogram with possible angioplasty    Written consent obtained?: Yes    Risks and benefits: Risks, benefits and alternatives were discussed    Consent given by:  Patient  Patient states understanding of procedure being performed: Yes    Patient's understanding of procedure matches consent: Yes    Procedure consent matches procedure scheduled: Yes    Expected level of sedation:  Moderate  Appropriately NPO:  Yes  ASA Class:  Class 1- healthy patient  Mallampati  :  Grade 2- soft palate, base of uvula, tonsillar pillars, and portion of posterior pharyngeal wall visible  Lungs:  Lungs clear with good breath sounds bilaterally  Heart:  Normal heart sounds and rate  History & Physical reviewed:  History and physical reviewed and no updates needed  Statement of review:  I have reviewed the lab findings, diagnostic data, medications, and the plan for sedation

## 2020-07-13 NOTE — PROGRESS NOTES
Patient arrived via cart from IR s/p Left upper extremity Fistulogram and angioplasty. VSS. Left arm tip stop CDI, no hematoma. + Bruit/+ Thrill. Patient denies pain. Tolerating regular diet. Patients daughter will be picking up patient at 1700.

## 2020-07-13 NOTE — PROGRESS NOTES
1705  IV DC'd intact & pressure dressing applied to site.  Daughter is here to  Supriya.  1710  Discharged to care of daughter per her own WC accompanied by staff RN.  Left upper arm fistulogram site remains FDI.  Sling remains in place.  CTRN

## 2020-07-13 NOTE — IP AVS SNAPSHOT
MRN:[id number]                      After Visit Summary   7/13/2020    Supriya Herr    MRN: [id number]           Visit Information        Department      7/13/2020 10:43 AM [facility], [place], Interventional Radiology          Review of your medicines      UNREVIEWED medicines. Ask your doctor about these medicines       Dose / Directions   acetaminophen 325 MG tablet  Commonly known as:  TYLENOL  Used for:  Diabetic ulcer of toe of right foot associated with type 2 diabetes mellitus, with other ulcer severity (H)      Dose:  650 mg  Take 2 tablets (650 mg) by mouth every 4 hours as needed for mild pain  Quantity:  100 tablet  Refills:  0     albuterol 108 (90 Base) MCG/ACT inhaler  Commonly known as:  PROAIR HFA/PROVENTIL HFA/VENTOLIN HFA  Used for:  Cough      Dose:  2 puff  Inhale 2 puffs into the lungs every 6 hours as needed for shortness of breath / dyspnea or wheezing  Quantity:  3 Inhaler  Refills:  1     amLODIPine 5 MG tablet  Commonly known as:  NORVASC  Used for:  Hypertension goal BP (blood pressure) < 140/90      Dose:  5 mg  Take 1 tablet (5 mg) by mouth 2 times daily  Quantity:  180 tablet  Refills:  3     apremilast 30 MG tablet  Commonly known as:  OTEZLA  Used for:  Inverse psoriasis      Dose:  30 mg  Take 1 tablet (30 mg) by mouth daily  Quantity:  90 tablet  Refills:  3     atorvastatin 20 MG tablet  Commonly known as:  LIPITOR  Used for:  Hyperlipidemia LDL goal <100      Dose:  20 mg  Take 1 tablet (20 mg) by mouth every evening  Quantity:  90 tablet  Refills:  2     carvedilol 25 MG tablet  Commonly known as:  COREG  Used for:  Essential hypertension      Dose:  25 mg  Take 1 tablet (25 mg) by mouth 2 times daily (with meals)  Quantity:  180 tablet  Refills:  3     clindamycin 300 MG capsule  Commonly known as:  CLEOCIN  Used for:  Chronic kidney disease, stage 5, kidney failure (H), Wound infection after surgery, Complication of vascular access for dialysis, subsequent  encounter  Ask about: Should I take this medication?      Dose:  300 mg  Take 1 capsule (300 mg) by mouth 4 times daily for 10 days  Quantity:  40 capsule  Refills:  0     desonide 0.05 % external ointment  Commonly known as:  DESOWEN      Apply topically as needed  Refills:  0     ferrous sulfate 325 (65 Fe) MG tablet  Commonly known as:  FEROSUL  Used for:  Anemia due to stage 5 chronic kidney disease, 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 (H)      Dose:  325 mg  Take 1 tablet (325 mg) by mouth daily (with breakfast) Absorbed best on an empty stomach. If stomach upset occurs, can take with meals.  Quantity:  90 tablet  Refills:  3     furosemide 80 MG tablet  Commonly known as:  LASIX  Used for:  CKD (chronic kidney disease) stage 5, GFR less than 15 ml/min (H), Anemia due to stage 5 chronic kidney disease, 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 (H)      Dose:  80 mg  Take 1 tablet (80 mg) by mouth 2 times daily  Quantity:  180 tablet  Refills:  3     insulin aspart 100 UNIT/ML pen  Commonly known as:  NovoLOG FLEXPEN  Used for:  Type 2 diabetes mellitus with hyperglycemia, with long-term current use of insulin (H)      INJECT 4 UNITS SUBCUTANEOUSLY WITH BREAKFAST, LUNCH AND DINNER.  Quantity:  15 mL  Refills:  3     insulin glargine 100 UNIT/ML pen  Used for:  Type 2 diabetes mellitus with diabetic neuropathy, with long-term current use of insulin (H)      Dose:  18 Units  Inject 18 Units Subcutaneous At Bedtime Pt to take 14 units if pt is NPO for surgery  Quantity:  2 mL  Refills:  3     sertraline 50 MG tablet  Commonly known as:  ZOLOFT  Used for:  NICOLE (generalized anxiety disorder)      Dose:  50 mg  Take 1 tablet (50 mg) by mouth daily  Quantity:  90 tablet  Refills:  3     sevelamer HCl 800 MG tablet  Commonly known as:  RENAGEL  Used for:  Hyperphosphatemia      Dose:  800 mg  Take 1 tablet (800 mg) by mouth 3 times daily (with meals)  Quantity:  270 tablet  Refills:  3     vitamin D3 50 mcg (2000 units) tablet  Commonly known as:   CHOLECALCIFEROL  Used for:  Vitamin D deficiency      Dose:  2,000 Units  Take 1 tablet (2,000 Units) by mouth daily  Quantity:  100 tablet  Refills:  3        CONTINUE these medicines which have NOT CHANGED       Dose / Directions   blood glucose lancets standard  Commonly known as:  NO BRAND SPECIFIED  Used for:  Type 2 diabetes mellitus with hyperglycemia, with long-term current use of insulin (H)      Use to test blood sugar 3 to 4  times daily or as directed.  Quantity:  400 each  Refills:  3     blood glucose monitoring meter device kit  Commonly known as:  NO BRAND SPECIFIED  Used for:  Type 2 diabetes mellitus with hyperglycemia, with long-term current use of insulin (H)      Use to test blood sugar 3 to 4 times daily or as directed.  Quantity:  1 kit  Refills:  0     * blood glucose test strip  Commonly known as:  ONETOUCH ULTRA  Used for:  Type 2 diabetes mellitus with diabetic nephropathy, with long-term current use of insulin (H)      TEST YOUR BLOOD SUGAR 3-4 TIMES PER DAY.  Quantity:  400 strip  Refills:  1     * blood glucose test strip  Commonly known as:  NO BRAND SPECIFIED  Used for:  Type 2 diabetes mellitus with hyperglycemia, with long-term current use of insulin (H)      Use to test blood sugar 3 to 4 times daily or as directed.  Quantity:  400 strip  Refills:  2     FreeStyle Avery 14 Day Sensor Misc  Used for:  Type 2 diabetes mellitus with diabetic neuropathy, with long-term current use of insulin (H)      Dose:  1 each  1 each every 14 days  Quantity:  6 each  Refills:  3     FreeStyle Avery [place] Emma  Used for:  Type 2 diabetes mellitus with diabetic neuropathy, with long-term current use of insulin (H)      Dose:  1 each  1 each See Admin Instructions Use per 's instructions to monitor glucose continuously.  Quantity:  1 Device  Refills:  0     insulin pen needle 31G X 6 MM miscellaneous  Commonly known as:  ULTICARE MINI  Used for:  Type 2 diabetes, HbA1c goal < 7% (H)       "Use daily or as directed.  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, unspecified laterality, subsequent encounter, CKD (chronic kidney disease) stage 3, GFR 30-59 ml/min (H), Type 2 diabetes mellitus with stage 3 chronic kidney disease, without long-term current use of insulin (H)      1 wheelchair  Quantity:  1 Device  Refills:  0     order for DME  Used for:  Seasonal affective disorder (H)      1 SAD light  Quantity:  1 Device  Refills:  1     order for DME  Used for:  CKD (chronic kidney disease) stage 5, GFR less than 15 ml/min (H), Immobility, 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 192#  Height 5'5\"  99 months/Lifetime  Quantity:  1 Units  Refills:  0         * This list has 2 medication(s) that are the same as other medications prescribed for you. Read the directions carefully, and ask your doctor or other care provider to review them with you.                  Protect others around you: Learn how to safely use, store and throw away your medicines at www.disposemymeds.org.       Follow-ups after your visit       Your next 10 appointments already scheduled    Jul 22, 2020  3:20 PM CDT  RETURN RETINA with Leanne Jett MD  [facility] ([facility]) [address]  [address] [address]  [facility] [address]  [phone]      Aug 07, 2020 10:00 AM CDT  (Arrive by 9:45 AM)  Return Visit with Jarrett Dior MD  [facility] ([facility]) [address]  [address] [facility] [address]  [phone]         Care Instructions       Further instructions from your care team         [facility]      Interventional Radiology  Discharge " Instructions Following Fistulogram      ? You may resume normal activities as tolerated, but avoid any strenuous activity or heavy lifting (>10 lbs.) involving your access arm.    ? Elevate and rest your arm as much as possible for the first 24 hours after the procedure.  This will promote healing and reduce swelling.     ? If bleeding or oozing should occur, apply fingertip pressure to puncture site to control bleeding, but avoid excessive pressure as this may clot off the fistula.  Hold light pressure for 10 minutes, or until bleeding/oozing is controlled.  If excessive bleeding is noted or if you are having difficulty controlling the bleeding with direct pressure, call 911.     ? If you develop fever, chills, excessive pain/tenderness or drainage at the puncture site, or have questions call your Doctor or Dialysis Center.     ? If you received sedation for your procedure: An adult should stay with you for 24 hours, Do Not drive a motor vehicle, operate machinery, or drink alcoholic beverages for 24 hours.     ? You may resume your normal medications immediately    ? If you notice sudden loss of pulse or thrill (buzzing feeling) in your fistula, contact your Doctor or Dialysis unit immediately.     Additional Instructions      [facility] INTERVENTIONAL RADIOLOGY DEPARTMENT  Procedure Physician:                                                Date of procedure: July 13, 2020  Telephone Numbers: [phone] Monday-Friday 8:00 am to 4:30 pm  [phone] After 4:30 pm Monday-Friday, Weekends & Holidays.   Ask for the Interventional Radiologist on call.  Someone is on call 24 hrs/day  [facility] toll free number: [phone] Monday-Friday 8:00 am to 4:30 pm  [facility] Emergency Dept: [phone]            Additional Information About Your Visit       [employer] Information    [facility] gives you secure access to your electronic health record. If you see a primary care provider, you can also send messages to your care team and make  appointments. If you have questions, please call your primary care clinic.  If you do not have a primary care provider, please call [phone] and they will assist you.       Care EveryWhere ID    This is your Care EveryWhere ID. This could be used by other organizations to access your [facility] medical records  [id number]       Your Vitals Were  Most recent update: 7/13/2020  4:11 PM    Blood Pressure   118/78 (BP Location: Right arm)    Pulse   72    Temperature   98.5  F (36.9  C) (Oral)    Respirations   16    Pulse Oximetry   99%          Primary Care Provider Office Phone # Fax #    Noam Luis Jackson [phone]      Equal Access to Services    KALYANI WARREN : Hadii danisha jorgeo Soaleksandar, waaxda luqadaha, qaybta kaalmada adeegyada, madeline sood . So [facility] [phone].    ATENCIÓN: Si habla español, tiene a santoro disposición servicios gratuitos de asistencia lingüística. Llame al [phone].    We comply with applicable federal and state civil rights laws, including the [place] Human Rights Act. We do not discriminate on the basis of race, color, creed, Yazdanism, national origin, marital status, age, disability, sex, sexual orientation, or gender identity.       Thank you!    Thank you for choosing [facility] for your care. Our goal is always to provide you with excellent care. Hearing back from our patients is one way we can continue to improve our services. Please take a few minutes to complete the written survey that you may receive in the mail after you visit with us. Thank you!            Medication List      Medications          Morning Afternoon Evening Bedtime As Needed    blood glucose lancets standard  Also known as:  NO BRAND SPECIFIED  INSTRUCTIONS:  Use to test blood sugar 3 to 4  times daily or as directed.                     blood glucose monitoring meter device kit  Also known as:  NO BRAND SPECIFIED  INSTRUCTIONS:  Use to test blood sugar  "3 to 4 times daily or as directed.                     * blood glucose test strip  Also known as:  ONETOUCH ULTRA  INSTRUCTIONS:  TEST YOUR BLOOD SUGAR 3-4 TIMES PER DAY.                     * blood glucose test strip  Also known as:  NO BRAND SPECIFIED  INSTRUCTIONS:  Use to test blood sugar 3 to 4 times daily or as directed.                     FreeStyle Avery 14 Day Sensor Misc  INSTRUCTIONS:  1 each every 14 days                     FreeStyle Avery [place] Emma  INSTRUCTIONS:  1 each See Admin Instructions Use per 's instructions to monitor glucose continuously.                     insulin pen needle 31G X 6 MM miscellaneous  Also known as:  ULTICARE MINI  INSTRUCTIONS:  Use daily or as directed.                     order for DME  INSTRUCTIONS:  1 wheelchair                     order for DME  INSTRUCTIONS:  1 SAD light                     order for DME  INSTRUCTIONS:  Equipment being ordered: mattress overlay for hospital bed  Wt. 192#  Height 5'5\"  99 months/Lifetime                        * This list has 2 medication(s) that are the same as other medications prescribed for you. Read the directions carefully, and ask your doctor or other care provider to review them with you.            ASK your doctor about these medications          Morning Afternoon Evening Bedtime As Needed    acetaminophen 325 MG tablet  Also known as:  TYLENOL  INSTRUCTIONS:  Take 2 tablets (650 mg) by mouth every 4 hours as needed for mild pain                     albuterol 108 (90 Base) MCG/ACT inhaler  Also known as:  PROAIR HFA/PROVENTIL HFA/VENTOLIN HFA  INSTRUCTIONS:  Inhale 2 puffs into the lungs every 6 hours as needed for shortness of breath / dyspnea or wheezing  Doctor's comments:  Pharmacy may dispense brand covered by insurance (Proair, or proventil or ventolin or generic albuterol inhaler)                     amLODIPine 5 MG tablet  Also known as:  NORVASC  INSTRUCTIONS:  Take 1 tablet (5 mg) by mouth 2 times " daily                     apremilast 30 MG tablet  Also known as:  OTEZLA  INSTRUCTIONS:  Take 1 tablet (30 mg) by mouth daily                     atorvastatin 20 MG tablet  Also known as:  LIPITOR  INSTRUCTIONS:  Take 1 tablet (20 mg) by mouth every evening  Doctor's comments:  DX Code Needed  .                     carvedilol 25 MG tablet  Also known as:  COREG  INSTRUCTIONS:  Take 1 tablet (25 mg) by mouth 2 times daily (with meals)                     clindamycin 300 MG capsule  Also known as:  CLEOCIN  INSTRUCTIONS:  Take 1 capsule (300 mg) by mouth 4 times daily for 10 days  Ask about: Should I take this medication?                     desonide 0.05 % external ointment  Also known as:  DESOWEN  INSTRUCTIONS:  Apply topically as needed                     ferrous sulfate 325 (65 Fe) MG tablet  Also known as:  FEROSUL  INSTRUCTIONS:  Take 1 tablet (325 mg) by mouth daily (with breakfast) Absorbed best on an empty stomach. If stomach upset occurs, can take with meals.                     furosemide 80 MG tablet  Also known as:  LASIX  INSTRUCTIONS:  Take 1 tablet (80 mg) by mouth 2 times daily                     insulin aspart 100 UNIT/ML pen  Also known as:  NovoLOG FLEXPEN  INSTRUCTIONS:  INJECT 4 UNITS SUBCUTANEOUSLY WITH BREAKFAST, LUNCH AND DINNER.  Doctor's comments:  DX Code Needed  .                     insulin glargine 100 UNIT/ML pen  INSTRUCTIONS:  Inject 18 Units Subcutaneous At Bedtime Pt to take 14 units if pt is NPO for surgery  Doctor's comments:  If Basaglar is not covered by insurance, may substitute Lantus at same dose and frequency.                       sertraline 50 MG tablet  Also known as:  ZOLOFT  INSTRUCTIONS:  Take 1 tablet (50 mg) by mouth daily                     sevelamer HCl 800 MG tablet  Also known as:  RENAGEL  INSTRUCTIONS:  Take 1 tablet (800 mg) by mouth 3 times daily (with meals)                     vitamin D3 50 mcg (2000 units) tablet  Also known as:   CHOLECALCIFEROL  INSTRUCTIONS:  Take 1 tablet (2,000 Units) by mouth daily

## 2020-07-13 NOTE — PROGRESS NOTES
Patient Name: Supriya Herr  Medical Record Number: [id number]  Today's Date: 7/13/2020    Procedure: IR fistulogram left  Proceduralist: Dr. Mcbride and Dr. Cohen    Procedure Start: 1335  Procedure end: 1500  Sedation medications administered: 4 mg versed and 200 mcg fentanyl     Report given to: 2A  : n/a    Other Notes: Pt arrived to IR room 4 from . Consent reviewed. Pt denies any questions or concerns regarding procedure. Pt positioned supine and monitored per protocol. Pt tolerated procedure without any noted complications. Pt transferred back to .

## 2020-07-21 DIAGNOSIS — E11.3213 TYPE 2 DIABETES MELLITUS WITH MILD NONPROLIFERATIVE RETINOPATHY OF BOTH EYES AND MACULAR EDEMA, UNSPECIFIED WHETHER LONG TERM INSULIN USE (H): Primary | ICD-10-CM

## 2020-07-22 ENCOUNTER — OFFICE VISIT (OUTPATIENT)
Dept: OPHTHALMOLOGY | Facility: CLINIC | Age: 70
End: 2020-07-22
Attending: OPHTHALMOLOGY
Payer: MEDICARE

## 2020-07-22 DIAGNOSIS — H25.013 CORTICAL SENILE CATARACT OF BOTH EYES: ICD-10-CM

## 2020-07-22 DIAGNOSIS — H25.811 COMBINED FORMS OF AGE-RELATED CATARACT OF RIGHT EYE: Primary | ICD-10-CM

## 2020-07-22 DIAGNOSIS — E11.3213 TYPE 2 DIABETES MELLITUS WITH MILD NONPROLIFERATIVE RETINOPATHY OF BOTH EYES AND MACULAR EDEMA, UNSPECIFIED WHETHER LONG TERM INSULIN USE (H): ICD-10-CM

## 2020-07-22 PROCEDURE — G0463 HOSPITAL OUTPT CLINIC VISIT: HCPCS | Mod: ZF

## 2020-07-22 PROCEDURE — 92134 CPTRZ OPH DX IMG PST SGM RTA: CPT | Mod: ZF | Performed by: OPHTHALMOLOGY

## 2020-07-22 PROCEDURE — 76519 ECHO EXAM OF EYE: CPT | Mod: ZF | Performed by: OPHTHALMOLOGY

## 2020-07-22 ASSESSMENT — SLIT LAMP EXAM - LIDS
COMMENTS: NORMAL
COMMENTS: NORMAL

## 2020-07-22 ASSESSMENT — VISUAL ACUITY
OD_CC: 20/60
OD_CC+: +2
OS_CC: 20/25
OS_CC+: -2
METHOD: SNELLEN - LINEAR

## 2020-07-22 ASSESSMENT — TONOMETRY
OD_IOP_MMHG: 9
IOP_METHOD: TONOPEN
OS_IOP_MMHG: 8

## 2020-07-22 ASSESSMENT — CUP TO DISC RATIO
OS_RATIO: 0.3
OD_RATIO: 0.3

## 2020-07-22 ASSESSMENT — CONF VISUAL FIELD
METHOD: COUNTING FINGERS
OD_NORMAL: 1
OS_NORMAL: 1

## 2020-07-22 NOTE — NURSING NOTE
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 She says vision is the same as last visit.     Dixon Agustin COT 3:31 PM July 22, 2020

## 2020-07-22 NOTE — PROGRESS NOTES
CC: follow up macula edema and blurry vision right eye    HPI: Supriya Herr is a  70 year old year-old patient initially referred by Dr Baeza for evaluation and treat of DMII with mild nonproliferative diabetic retinopathy and cataract.     Interval history: Vision seems stable compared to last visit. Reports blood sugar control good and last A1c was 5.5 on 8/5/2019.   No changes since last visit. Is ready for cataract surgery     PMH:  History of Diabetes mellitus on insulin.   Past ocular history: History of cataract surgery left eye.   history of Macular hole left eye.   Status post Pars plana vitrectomy, membrane peel, air-fluid exchange, SF6 gas infusion, left eye 2/14/2012 by Dr. Daniel     Retinal Imaging:  OCT 7/22/2020  RE: few foveal cystic interval improved IRF; retina thinning and partial thickness Macula hole. Stable   LE:  Retina irregularity, trace sup Epiretinal membrane; retina thinning.    optos pic 04/11/19 consistent with exam    fluorescein angiography: 12/04/19 9   Right eye: blockage of fluorescein angiography on the areas of heme; few microaneurysms; mild late Diabetic macular edema and PP leakage  No neovascularization elsewhere; no neovascularization of the disc.  Left eye: few microaneurysms; mild late Diabetic macular edema; staining of the peripheral Chorioretinal  scars    intraocular lens calcs:  AL right eye 25.85  AL left eye: 25.39    left eye pseudophakia ZCBOO 18.5     Assessment & Plan:  1. Moderate nonproliferative diabetic retinopathy and mild - Diabetic macular edema both eyes  - mild stable edema in right eye, stable in left eye   Stable- observe    2. Cataract right eye   - Becoming visually significant   - intraocular lens calcs 7/22/2020   - best corrected visual acuity 20/50 right eye   - patient amenable to proceed w cataract surgery, plan around dialysis schedule     I reviewed the indications, risks, benefits, and alternatives of the proposed surgical  procedure. We discussed at length cataracts and the effect of the cataracts on vision.   We discussed the fact that modern cataract surgery is usually successful at alleviating symptoms of glare when the cataract is the causative factor. Other risks were discussed with patient including, but not limited to, failure to improve vision or further loss of vision,  and need for additional surgery, bleeding, infection, loss of vision and the remote possibility of complications of anesthesia. 1:1000 risk of infection/bleed/loss of eye; 1:100 risk of RD and need for further surgery. Patient agreed to proceed with surgery.  I provided multiple opportunities for the questions, answered all questions to the best of my ability, and confirmed that my answers and my discussion were understood.     3. right eye - retinal macroaneurysm   at the sup temp margin of disc may contribute to macular edema;   - MA seems to be getting thrombosed   - status post avastin inj x2 for cystoid macular edema with improvement  - Last DOROTEO 8/15/19 (#1)  - Blood pressure (<120/80) and blood glucose (HbA1c <7.0) control discussed with patient.   - Patient advised that failure to adequately control each may lead to vision loss. The patient expressed understanding.- improved on exam today     4. Mod Hypertensive retinopathy   - Stage 5 kidney disease  - Considering HD   - blood pressure control has been poor in 160s/90s-100s  - Likely contributing to macular edema    5. Pseudophakia left eye  best corrected visual acuity 20/30 left eye   Observe    6. History of Macula hole repair left eye by Dr. Daniel  S/p PPV, mp, air-fluid exchange, SF6 gas infusion, left eye 2/14/2012 by Dr. Daniel    Macula hole closed  Observe    7. Dry eye syndrome   Status post punctal plugs   artificial tears  As needed and warm compresses     [place] MD  Vitreoretinal Surgery Fellow  [facility]      ~~~~~~~~~~~~~~~~~~~~~~~~~~~~~~~~~~   Complete  documentation of historical and exam elements from today's encounter can be found in the full encounter summary report (not reduplicated in this progress note).  I personally obtained the chief complaint(s) and history of present illness.  I confirmed and edited as necessary the review of systems, past medical/surgical history, family history, social history, and examination findings as documented by others; and I examined the patient myself.  I personally reviewed the relevant tests, images, and reports as documented above.  I personally reviewed the ophthalmic test(s) associated with this encounter, agree with the interpretation(s) as documented by the resident/fellow, and have edited the corresponding report(s) as necessary.   I formulated and edited as necessary the assessment and plan and discussed the findings and management plan with the patient and family    Leanne Jett MD   of Ophthalmology.  Retina Service   Department of Ophthalmology and Visual Neurosciences   [facility]  Phone: [phone]   Fax: [phone]

## 2020-07-23 ENCOUNTER — TELEPHONE (OUTPATIENT)
Dept: OPHTHALMOLOGY | Facility: CLINIC | Age: 70
End: 2020-07-23

## 2020-07-23 NOTE — TELEPHONE ENCOUNTER
Called patient daughter to schedule right eye procedure with Dr. Jett, The daughter Caroline was driving and asked that I call her back after 12 pm today to schedule      I will try calling back later.

## 2020-07-23 NOTE — TELEPHONE ENCOUNTER
I called Daughter Caroline back to schedule right eye procedure with Dr. Jett for her mother.    Daughter states that due to her mother having dialysis twice per week . They could only do surgery on Mondays or Wednesdays.   I advised the daughter that I would send a message to her mothers care team.     I left the patient's daughter with my direct line [phone] to call back if she had any other questions or concerns.

## 2020-07-24 NOTE — TELEPHONE ENCOUNTER
FUTURE VISIT INFORMATION      SURGERY INFORMATION:    H&P dos 8/10 surgery with Dr. Jtet    Consult: ov     RECORDS REQUESTED FROM:       Primary Care Provider: Noam Jackson MD [facility]    Pertinent Medical History: deonte, hypertension    Most recent EKG+ Tracin20    Most recent ECHO: 3/29/18    Most recent Cardiac Stress Test: 19    Most recent PFT's: 18

## 2020-07-28 ENCOUNTER — MYC REFILL (OUTPATIENT)
Dept: ENDOCRINOLOGY | Facility: CLINIC | Age: 70
End: 2020-07-28

## 2020-07-28 ENCOUNTER — MYC REFILL (OUTPATIENT)
Dept: NEPHROLOGY | Facility: CLINIC | Age: 70
End: 2020-07-28

## 2020-07-28 DIAGNOSIS — Z79.4 TYPE 2 DIABETES MELLITUS WITH HYPERGLYCEMIA, WITH LONG-TERM CURRENT USE OF INSULIN (H): ICD-10-CM

## 2020-07-28 DIAGNOSIS — I10 HYPERTENSION GOAL BP (BLOOD PRESSURE) < 140/90: ICD-10-CM

## 2020-07-28 DIAGNOSIS — E11.65 TYPE 2 DIABETES MELLITUS WITH HYPERGLYCEMIA, WITH LONG-TERM CURRENT USE OF INSULIN (H): ICD-10-CM

## 2020-07-28 RX ORDER — AMLODIPINE BESYLATE 5 MG/1
5 TABLET ORAL 2 TIMES DAILY
Qty: 180 TABLET | Refills: 3 | Status: SHIPPED | OUTPATIENT
Start: 2020-07-28 | End: 2021-07-26

## 2020-07-29 RX ORDER — INSULIN ASPART 100 [IU]/ML
INJECTION, SOLUTION INTRAVENOUS; SUBCUTANEOUS
Qty: 15 ML | Refills: 3 | Status: SHIPPED | OUTPATIENT
Start: 2020-07-29 | End: 2021-10-20

## 2020-07-29 NOTE — TELEPHONE ENCOUNTER
insulin aspart (NOVOLOG FLEXPEN) 100 UNIT/ML pen       Last Written Prescription Date:  10-10-19  Last Fill Quantity: 15 ml,   # refills: 3  Last Office Visit : 2-14-20  Future Office visit:  none    Routing refill request to provider for review/approval because:  Insulin - refilled per clinic

## 2020-08-04 ENCOUNTER — TELEPHONE (OUTPATIENT)
Dept: OPHTHALMOLOGY | Facility: CLINIC | Age: 70
End: 2020-08-04

## 2020-08-04 DIAGNOSIS — N18.5 CKD (CHRONIC KIDNEY DISEASE) STAGE 5, GFR LESS THAN 15 ML/MIN (H): ICD-10-CM

## 2020-08-04 DIAGNOSIS — Z11.59 ENCOUNTER FOR SCREENING FOR OTHER VIRAL DISEASES: Primary | ICD-10-CM

## 2020-08-04 DIAGNOSIS — N18.5 ANEMIA DUE TO STAGE 5 CHRONIC KIDNEY DISEASE, NOT ON CHRONIC DIALYSIS (H): ICD-10-CM

## 2020-08-04 DIAGNOSIS — I10 ESSENTIAL HYPERTENSION: ICD-10-CM

## 2020-08-04 DIAGNOSIS — D63.1 ANEMIA DUE TO STAGE 5 CHRONIC KIDNEY DISEASE, NOT ON CHRONIC DIALYSIS (H): ICD-10-CM

## 2020-08-04 RX ORDER — CARVEDILOL 25 MG/1
25 TABLET ORAL 2 TIMES DAILY WITH MEALS
Qty: 180 TABLET | Refills: 3 | Status: SHIPPED | OUTPATIENT
Start: 2020-08-04 | End: 2021-08-12

## 2020-08-04 RX ORDER — FUROSEMIDE 80 MG
80 TABLET ORAL 2 TIMES DAILY
Qty: 180 TABLET | Refills: 3 | Status: SHIPPED | OUTPATIENT
Start: 2020-08-04 | End: 2021-11-05

## 2020-08-04 NOTE — TELEPHONE ENCOUNTER
Called and spoke with Caroline Gray (daughter) we discussed Dr. Jett not being available Monday or Wednesday this month for surgery. Caroline Gray was in a meeting and so we couldn't finish setting up everything today and I will call back tomorrow between 8:30-9:30 to finish scheduling.

## 2020-08-05 NOTE — TELEPHONE ENCOUNTER
Patient is scheduled for surgery with Dr. Leanne Jett     Spoke with: Caroline Gray     Date of Surgery: 08/11/20     Location: [facility]:  [address] [place] [address]     Informed patient they will need an adult : Yes     H&P will be completed at: PAC 08/07     Post Op scheduled on 08/12 and 08/19     Surgery packet was mailed 08/05     Additional comments: Advised RN will call 1 - 2 business days prior with arrival time and instructions.

## 2020-08-05 NOTE — TELEPHONE ENCOUNTER
FUTURE VISIT INFORMATION        SURGERY INFORMATION:    H&P dos 8/10 surgery with Dr. Jett    Consult: ov      RECORDS REQUESTED FROM:         Primary Care Provider: Noam Jackson MD [facility]     Pertinent Medical History: deonte, hypertension     Most recent EKG+ Tracin20     Most recent ECHO: 3/29/18     Most recent Cardiac Stress Test: 19     Most recent PFT's: 18

## 2020-08-07 ENCOUNTER — PRE VISIT (OUTPATIENT)
Dept: SURGERY | Facility: CLINIC | Age: 70
End: 2020-08-07

## 2020-08-07 ENCOUNTER — ANESTHESIA EVENT (OUTPATIENT)
Dept: SURGERY | Facility: AMBULATORY SURGERY CENTER | Age: 70
End: 2020-08-07

## 2020-08-07 ENCOUNTER — VIRTUAL VISIT (OUTPATIENT)
Dept: SURGERY | Facility: CLINIC | Age: 70
End: 2020-08-07
Payer: MEDICARE

## 2020-08-07 ENCOUNTER — OFFICE VISIT (OUTPATIENT)
Dept: DERMATOLOGY | Facility: CLINIC | Age: 70
End: 2020-08-07
Payer: MEDICARE

## 2020-08-07 VITALS — WEIGHT: 183.86 LBS | BODY MASS INDEX: 29.55 KG/M2 | HEIGHT: 66 IN

## 2020-08-07 DIAGNOSIS — Z01.818 PREOP EXAMINATION: Primary | ICD-10-CM

## 2020-08-07 DIAGNOSIS — Z11.59 ENCOUNTER FOR SCREENING FOR OTHER VIRAL DISEASES: ICD-10-CM

## 2020-08-07 DIAGNOSIS — H26.9 CATARACT OF RIGHT EYE, UNSPECIFIED CATARACT TYPE: ICD-10-CM

## 2020-08-07 DIAGNOSIS — L40.8 INVERSE PSORIASIS: Primary | ICD-10-CM

## 2020-08-07 LAB
SARS-COV-2 RNA SPEC QL NAA+PROBE: NOT DETECTED
SPECIMEN SOURCE: NORMAL

## 2020-08-07 PROCEDURE — U0003 INFECTIOUS AGENT DETECTION BY NUCLEIC ACID (DNA OR RNA); SEVERE ACUTE RESPIRATORY SYNDROME CORONAVIRUS 2 (SARS-COV-2) (CORONAVIRUS DISEASE [COVID-19]), AMPLIFIED PROBE TECHNIQUE, MAKING USE OF HIGH THROUGHPUT TECHNOLOGIES AS DESCRIBED BY CMS-2020-01-R: HCPCS | Performed by: OPHTHALMOLOGY

## 2020-08-07 RX ORDER — SEVELAMER CARBONATE 800 MG/1
1600 TABLET, FILM COATED ORAL
COMMUNITY
Start: 2020-07-13 | End: 2021-04-09

## 2020-08-07 RX ORDER — BETAMETHASONE DIPROPIONATE 0.05 %
OINTMENT (GRAM) TOPICAL
Qty: 50 G | Refills: 5 | Status: SHIPPED | OUTPATIENT
Start: 2020-08-07 | End: 2020-10-28

## 2020-08-07 ASSESSMENT — PAIN SCALES - GENERAL
PAINLEVEL: NO PAIN (0)
PAINLEVEL: NO PAIN (0)

## 2020-08-07 ASSESSMENT — LIFESTYLE VARIABLES: TOBACCO_USE: 1

## 2020-08-07 ASSESSMENT — MIFFLIN-ST. JEOR: SCORE: 1365.75

## 2020-08-07 NOTE — PATIENT INSTRUCTIONS
Preparing for Your Surgery      Name:  Supriya Herr   MRN:  [id number]   :  1950   Today's Date:  2020         Arriving for surgery:  Surgery date:  2020  Arrival time:  6:30 am    Restrictions due to COVID 19:  No visitors at the Surgery Center   parking is not available     Please come to:    [facility]  [address] [place] [id number]     Parking has been suspended due to the Covid 19 Crisis    Please arrive at the [facility] Entrance  Follow instructions for entering the building  You will be escorted to the 5th floor by a staff member       What can I eat or drink?    -  You may eat and drink normally until 8 hours before surgery. (Until midnight)  -  You may have clear liquids up to 4 hours before surgery. (Until 4 am)  Examples of clear liquids:  Water  Clear broth  Juices (apple, white grape, white cranberry  and cider) without pulp  Noncarbonated, powder based beverages  (lemonade and Pastor-Aid)  Sodas (Sprite, 7-Up, ginger ale and seltzer)  Coffee or tea (without milk or cream)  Gatorade    --No alcohol for at least 24 hours before surgery    Which medicines can I take?    Hold aspirin for 7 days before surgery.   Hold multivitamins for 7 days before surgery.  Hold supplements for 7 days before surgery.  Hold Ibuprofen (Advil, Motrin) for 1 day before surgery--unless otherwise directed by surgeon.  Hold Naproxen (Aleve) for 4 days before surgery.    -  DO NOT take the following medications the day of surgery:  Novolog insulin (short acting insulin)        Take 14 units of Basaglar insulin the evening before surgery       -  PLEASE TAKE the following medications the day of surgery   All other usual am prescription medications       How do I prepare myself?  - Please take 2 showers before surgery using Scrubcare or Hibiclens soap.    Use this soap only from the neck to your toes.     Leave the soap on your skin for one  minute--then rinse thoroughly.      You may use your own shampoo and conditioner; no other hair products.   - Please remove all jewelry and body piercings.  - No lotions, deodorants or fragrance.  - No makeup or fingernail polish.   - Bring your ID and insurance card.        - All patients are required to have a Covid-19 test within 4 days of surgery/procedure.      -Patients will be contacted by the [facility] scheduling team within 1 week of surgery to make an appointment.      - Patients may call the Scheduling team at [phone] if they have not been scheduled within 4 days of  surgery.      ALL PATIENTS ARE REQUIRED TO HAVE A RESPONSIBLE ADULT TO DRIVE AND BE IN ATTENDANCE WITH THEM FOR 24 HOURS FOLLOWING SURGERY       Questions or Concerns:    -For questions regarding the day of surgery please contact the Ambulatory Surgery Center at [phone].    -If you have health changes between today and your surgery please contact your surgeon.     For questions after surgery please call your surgeons office.
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
## 2020-08-07 NOTE — NURSING NOTE
Dermatology Rooming Note    Supriya Herr's goals for this visit include:   Chief Complaint   Patient presents with     Skin Check     Supriya is here today for a skin check      FABIEN Oneill
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
## 2020-08-07 NOTE — PROGRESS NOTES
"Supriya Herr is a 70 year old female who is being evaluated via a billable video visit.      The patient has been notified of following:     \"This video visit will be conducted via a call between you and your physician/provider. We have found that certain health care needs can be provided without the need for an in-person physical exam.  This service lets us provide the care you need with a video conversation.  If a prescription is necessary we can send it directly to your pharmacy.  If lab work is needed we can place an order for that and you can then stop by our lab to have the test done at a later time.    Video visits are billed at different rates depending on your insurance coverage.  Please reach out to your insurance provider with any questions.    If during the course of the call the physician/provider feels a video visit is not appropriate, you will not be charged for this service.\"    Patient has given verbal consent for Video visit? Yes  How would you like to obtain your AVS? Theodorahart  If you are dropped from the video visit, the video invite should be resent to: Other e-mail: Riki  Will anyone else be joining your video visit? No        Indio Mathews      "
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
## 2020-08-11 ENCOUNTER — DOCUMENTATION ONLY (OUTPATIENT)
Dept: DERMATOLOGY | Facility: CLINIC | Age: 70
End: 2020-08-11

## 2020-08-11 ENCOUNTER — HOSPITAL ENCOUNTER (OUTPATIENT)
Facility: AMBULATORY SURGERY CENTER | Age: 70
End: 2020-08-11
Attending: OPHTHALMOLOGY
Payer: MEDICARE

## 2020-08-11 ENCOUNTER — ANESTHESIA (OUTPATIENT)
Dept: SURGERY | Facility: AMBULATORY SURGERY CENTER | Age: 70
End: 2020-08-11

## 2020-08-11 VITALS
SYSTOLIC BLOOD PRESSURE: 142 MMHG | WEIGHT: 200 LBS | TEMPERATURE: 97.1 F | BODY MASS INDEX: 32.28 KG/M2 | OXYGEN SATURATION: 100 % | RESPIRATION RATE: 20 BRPM | DIASTOLIC BLOOD PRESSURE: 52 MMHG | HEART RATE: 60 BPM

## 2020-08-11 DIAGNOSIS — Z48.810 AFTERCARE FOLLOWING SURGERY OF A SENSE ORGAN: ICD-10-CM

## 2020-08-11 DIAGNOSIS — H25.811 COMBINED FORMS OF AGE-RELATED CATARACT OF RIGHT EYE: Primary | ICD-10-CM

## 2020-08-11 LAB — GLUCOSE BLDC GLUCOMTR-MCNC: 105 MG/DL (ref 70–99)

## 2020-08-11 DEVICE — EYE IMP IOL AMO PCL TECNIS ZCB00 14.0: Type: IMPLANTABLE DEVICE | Site: EYE | Status: FUNCTIONAL

## 2020-08-11 RX ORDER — TETRACAINE HYDROCHLORIDE 5 MG/ML
SOLUTION OPHTHALMIC PRN
Status: DISCONTINUED | OUTPATIENT
Start: 2020-08-11 | End: 2020-08-11 | Stop reason: HOSPADM

## 2020-08-11 RX ORDER — MOXIFLOXACIN 5 MG/ML
1 SOLUTION/ DROPS OPHTHALMIC
Status: COMPLETED | OUTPATIENT
Start: 2020-08-11 | End: 2020-08-11

## 2020-08-11 RX ORDER — DICLOFENAC SODIUM 1 MG/ML
1 SOLUTION/ DROPS OPHTHALMIC
Status: COMPLETED | OUTPATIENT
Start: 2020-08-11 | End: 2020-08-11

## 2020-08-11 RX ORDER — SODIUM CHLORIDE, SODIUM LACTATE, POTASSIUM CHLORIDE, CALCIUM CHLORIDE 600; 310; 30; 20 MG/100ML; MG/100ML; MG/100ML; MG/100ML
INJECTION, SOLUTION INTRAVENOUS CONTINUOUS
Status: DISCONTINUED | OUTPATIENT
Start: 2020-08-11 | End: 2020-08-12 | Stop reason: HOSPADM

## 2020-08-11 RX ORDER — KETOROLAC TROMETHAMINE 4 MG/ML
1 SOLUTION/ DROPS OPHTHALMIC 4 TIMES DAILY
Qty: 5 ML | Refills: 0 | Status: SHIPPED | OUTPATIENT
Start: 2020-08-11 | End: 2020-11-04

## 2020-08-11 RX ORDER — MOXIFLOXACIN 5 MG/ML
1 SOLUTION/ DROPS OPHTHALMIC 4 TIMES DAILY
Qty: 3 ML | Refills: 0 | Status: SHIPPED | OUTPATIENT
Start: 2020-08-11 | End: 2020-10-28

## 2020-08-11 RX ORDER — ONDANSETRON 2 MG/ML
4 INJECTION INTRAMUSCULAR; INTRAVENOUS EVERY 30 MIN PRN
Status: DISCONTINUED | OUTPATIENT
Start: 2020-08-11 | End: 2020-08-12 | Stop reason: HOSPADM

## 2020-08-11 RX ORDER — LIDOCAINE 40 MG/G
CREAM TOPICAL
Status: DISCONTINUED | OUTPATIENT
Start: 2020-08-11 | End: 2020-08-11 | Stop reason: HOSPADM

## 2020-08-11 RX ORDER — FENTANYL CITRATE 50 UG/ML
25-50 INJECTION, SOLUTION INTRAMUSCULAR; INTRAVENOUS
Status: DISCONTINUED | OUTPATIENT
Start: 2020-08-11 | End: 2020-08-11 | Stop reason: HOSPADM

## 2020-08-11 RX ORDER — FENTANYL CITRATE 50 UG/ML
INJECTION, SOLUTION INTRAMUSCULAR; INTRAVENOUS PRN
Status: DISCONTINUED | OUTPATIENT
Start: 2020-08-11 | End: 2020-08-11

## 2020-08-11 RX ORDER — NALOXONE HYDROCHLORIDE 0.4 MG/ML
.1-.4 INJECTION, SOLUTION INTRAMUSCULAR; INTRAVENOUS; SUBCUTANEOUS
Status: DISCONTINUED | OUTPATIENT
Start: 2020-08-11 | End: 2020-08-12 | Stop reason: HOSPADM

## 2020-08-11 RX ORDER — PREDNISOLONE ACETATE 10 MG/ML
1 SUSPENSION/ DROPS OPHTHALMIC 4 TIMES DAILY
Qty: 5 ML | Refills: 0 | Status: SHIPPED | OUTPATIENT
Start: 2020-08-11 | End: 2020-10-28

## 2020-08-11 RX ORDER — DEXAMETHASONE SODIUM PHOSPHATE 4 MG/ML
INJECTION, SOLUTION INTRA-ARTICULAR; INTRALESIONAL; INTRAMUSCULAR; INTRAVENOUS; SOFT TISSUE PRN
Status: DISCONTINUED | OUTPATIENT
Start: 2020-08-11 | End: 2020-08-11 | Stop reason: HOSPADM

## 2020-08-11 RX ORDER — ONDANSETRON 4 MG/1
4 TABLET, ORALLY DISINTEGRATING ORAL EVERY 30 MIN PRN
Status: DISCONTINUED | OUTPATIENT
Start: 2020-08-11 | End: 2020-08-12 | Stop reason: HOSPADM

## 2020-08-11 RX ORDER — PROPOFOL 10 MG/ML
INJECTION, EMULSION INTRAVENOUS PRN
Status: DISCONTINUED | OUTPATIENT
Start: 2020-08-11 | End: 2020-08-11

## 2020-08-11 RX ORDER — CYCLOPENTOLAT/TROPIC/PHENYLEPH 1%-1%-2.5%
1 DROPS (EA) OPHTHALMIC (EYE)
Status: COMPLETED | OUTPATIENT
Start: 2020-08-11 | End: 2020-08-11

## 2020-08-11 RX ORDER — ACETAMINOPHEN 325 MG/1
975 TABLET ORAL ONCE
Status: COMPLETED | OUTPATIENT
Start: 2020-08-11 | End: 2020-08-11

## 2020-08-11 RX ORDER — BALANCED SALT SOLUTION 6.4; .75; .48; .3; 3.9; 1.7 MG/ML; MG/ML; MG/ML; MG/ML; MG/ML; MG/ML
SOLUTION OPHTHALMIC PRN
Status: DISCONTINUED | OUTPATIENT
Start: 2020-08-11 | End: 2020-08-11 | Stop reason: HOSPADM

## 2020-08-11 RX ORDER — SODIUM CHLORIDE, SODIUM LACTATE, POTASSIUM CHLORIDE, CALCIUM CHLORIDE 600; 310; 30; 20 MG/100ML; MG/100ML; MG/100ML; MG/100ML
500 INJECTION, SOLUTION INTRAVENOUS CONTINUOUS
Status: DISCONTINUED | OUTPATIENT
Start: 2020-08-11 | End: 2020-08-11 | Stop reason: HOSPADM

## 2020-08-11 RX ADMIN — Medication 1 DROP: at 07:23

## 2020-08-11 RX ADMIN — MOXIFLOXACIN 1 DROP: 5 SOLUTION/ DROPS OPHTHALMIC at 07:23

## 2020-08-11 RX ADMIN — ACETAMINOPHEN 975 MG: 325 TABLET ORAL at 07:21

## 2020-08-11 RX ADMIN — DICLOFENAC SODIUM 1 DROP: 1 SOLUTION/ DROPS OPHTHALMIC at 07:36

## 2020-08-11 RX ADMIN — DICLOFENAC SODIUM 1 DROP: 1 SOLUTION/ DROPS OPHTHALMIC at 07:23

## 2020-08-11 RX ADMIN — DICLOFENAC SODIUM 1 DROP: 1 SOLUTION/ DROPS OPHTHALMIC at 07:31

## 2020-08-11 RX ADMIN — PROPOFOL 30 MG: 10 INJECTION, EMULSION INTRAVENOUS at 08:10

## 2020-08-11 RX ADMIN — MOXIFLOXACIN 1 DROP: 5 SOLUTION/ DROPS OPHTHALMIC at 07:36

## 2020-08-11 RX ADMIN — SODIUM CHLORIDE, SODIUM LACTATE, POTASSIUM CHLORIDE, CALCIUM CHLORIDE 500 ML: 600; 310; 30; 20 INJECTION, SOLUTION INTRAVENOUS at 07:35

## 2020-08-11 RX ADMIN — FENTANYL CITRATE 25 MCG: 50 INJECTION, SOLUTION INTRAMUSCULAR; INTRAVENOUS at 08:12

## 2020-08-11 RX ADMIN — MOXIFLOXACIN 1 DROP: 5 SOLUTION/ DROPS OPHTHALMIC at 07:31

## 2020-08-11 RX ADMIN — Medication 1 DROP: at 07:36

## 2020-08-11 RX ADMIN — Medication 1 DROP: at 07:31

## 2020-08-11 NOTE — DISCHARGE INSTRUCTIONS
[facility]  Home Care Following Anesthesia  For 24 hours after surgery:  1. Get plenty of rest.  A responsible adult must stay with you for at least 24 hours after you leave the surgery center.  2. Do not drive or use heavy equipment.  If you have weakness or tingling, don't drive or use heavy equipment until this feeling goes away.   3. Do not drink alcohol.   4. Avoid strenuous or risky activities.  Ask for help when climbing stairs.  5. You may feel lightheaded.  IF so, sit for a few minutes before standing.  Have someone help you get up.   6. If you have nausea (feel sick to your stomach): Drink only clear liquids such as apple juice, ginger ale, broth or 7-Up.  Rest may also help.  Be sure to drink enough fluids.  Move to a regular diet as you feel able.   7. You may have a slight fever.  Call the doctor if your fever is over 100 F (37.7 C) (taken under the tongue) or lasts longer than 24 hours.  8. You may have a dry mouth, a sore throat, muscle aches or trouble sleeping. These should go away after 24 hours.  9. Do not make important or legal decisions.               Tips for taking pain medications  To get the best pain relief possible, remember these points:    Take pain medications as directed, before pain becomes severe.    Pain medication can upset your stomach: taking it with food may help.    Constipation is a common side effect of pain medication. Drink plenty of  fluids.    Eat foods high in fiber. Take a stool softener if recommended by your doctor or pharmacist.    Do not drink alcohol, drive or operate machinery while taking pain medications.    Ask about other ways to control pain, such as with heat, ice or relaxation.    Tylenol/Acetaminophen Consumption  To help encourage the safe use of acetaminophen, the makers of TYLENOL  have lowered the maximum daily dose for single-ingredient Extra Strength TYLENOL  (acetaminophen) products sold in the [place] from 8  pills per day (4,000 mg) to 6 pills per day (3,000 mg). The dosing interval has also changed from 2 pills every 4-6 hours to 2 pills every 6 hours.    If you feel your pain relief is insufficient, you may take Tylenol/Acetaminophen in addition to your narcotic pain medication.     Be careful not to exceed 3,000 mg of Tylenol/Acetaminophen in a 24 hour period from all sources.    If you are taking extra strength Tylenol/acetaminophen (500 mg), the maximum dose is 6 tablets in 24 hours.    If you are taking regular strength acetaminophen (325 mg), the maximum dose is 9 tablets in 24 hours.    Call a doctor for any of the followin. Signs of infection (fever, growing tenderness at the surgery site, a large amount of drainage or bleeding, severe pain, foul-smelling drainage, redness, swelling).  2. It has been over 8 to 10 hours since surgery and you are still not able to urinate (pass water).  3. Headache for over 24 hours.  4. Numbness, tingling or weakness the day after surgery (if you had spinal anesthesia).  5. Signs of Covid-19 infection (temperature over 100 degrees, shortness of breath, cough, loss of taste/smell, generalized body aches, persistent headache, chills, sore throat, nausea/vomiting/diarrhea)  Your doctor is:       Dr. Leanne Jett, Ophthalmology: [phone]               Or dial [phone] and ask for the resident on call for:  Ophthalmology  For emergency care, call the:  [facility]:  [phone] (TTY for hearing impaired: [phone])

## 2020-08-11 NOTE — OP NOTE
SURGEON:   JOSEFA BATEMAN   Assistant surgeon: Dr Barry Haro (resident) and Dr Cleveland Colorado (Fellow)    PREOPERATIVE DIAGNOSIS: 1. visually significant cataract right eye   POSTOPERATIVE DIAGNOSIS: same  NAME OF THE PROCEDURE: Phacoemulsification with intraocular lens implantation, ZCBOO 14.0D right eye   ANESTHESIA: Monitored anesthesia care and peribulbar block   COMPLICATIONS: none  INDICATIONS: Supriya Herr is a 70 year old with diagnosis of visually significant cataract, here for cataract surgery RIGHT EYE    DESCRIPTION OF THE PROCEDURE:  The patient was taken to the operative room where intravenous sedation was administered and a peribulbar block consisting of a 1:1 mixture of 2%lidocaine and 0.75% marcaine and wydase, was administered to the operative eye with adequate anesthesia and akinesia.    The operative eye was prepped and draped in the usual sterile surgical fashion for ophthalmic surgery, including the installation of one drop of 5% Povidone Iodine.  A sterile drape was placed over the face and body and a lid speculum was inserted.      With the use of a Supersharp blade and 0.12 forceps, a paracentesis was created at the 12 o'clock position, and viscoelastic was injected into the anterior chamber using a canula.  A 2.75 mm keratome was then used to construct a clear corneal incision at the 9 o'clock position.  Using Utrata forceps and cystotome needle, a continuous curvilinear capsulorrhexis was created and hydrodissection was undertaken with the use of BSS. The nucleus was found to be freely mobile and then removed by phacoemulsification using a stop and chop technique.  The remaining elements of cortex were then removed with irrigation/aspiration.  An IOL, was injected into the capsular bag and was rotated into a good position with a Sinskey hook. The remaining elements of viscoelastic were then removed with irrigation/aspiration. The wounds were hydrodissect and were  watertight.    The lid speculum was removed.  Subconjunctival injection of Dexamethasone and vanco were administered. The eye was cleaned with wet and dry gauze. Maxitrol ointment was placed on the eye.  A patch and Beth shield were placed over the eye.  The patient was discharge in stable condition having tolerated the procedure well.    Implant Name Type Inv. Item Serial No.  Lot No. LRB No. Used Action   EYE IMP IOL JEFFERY PCL TECNIS ZCB00 14.0 Lens/Eye Implant EYE IMP IOL JEFFERY PCL TECNIS ZCB00 14.0 [id number] [employer]  Right 1 Implanted     The surgery was assisted by Dr.Tahsin Colorado and Barry Haro. Due to the delicate and complex nature of this surgery, an assistant was required. Dr.Tahsin Colorado assisted with Cataract extraction. I was present for the entire surgery.

## 2020-08-11 NOTE — ANESTHESIA POSTPROCEDURE EVALUATION
Anesthesia POST Procedure Evaluation    Patient: Supriya Herr   MRN:     [id number] Gender:   female   Age:    70 year old :      1950        Preoperative Diagnosis: Combined forms of age-related cataract of right eye [H25.811]   Procedure(s):  PHACOEMULSIFICATION, CATARACT, WITH INTRAOCULAR LENS IMPLANT   Postop Comments: No value filed.     Anesthesia Type: No value filed.       Disposition: Outpatient   Postop Pain Control: Uneventful            Sign Out: Well controlled pain   PONV: No   Neuro/Psych: Uneventful            Sign Out: Acceptable/Baseline neuro status   Airway/Respiratory: Uneventful            Sign Out: Acceptable/Baseline resp. status   CV/Hemodynamics: Uneventful            Sign Out: Acceptable CV status   Other NRE: NONE   DID A NON-ROUTINE EVENT OCCUR? No         Last Anesthesia Record Vitals:  CRNA VITALS  2020 0833 - 2020 0933      2020             EKG:  Sinus bradycardia          Last PACU Vitals:  Vitals Value Taken Time   /57 2020  9:05 AM   Temp 36.2  C (97.2  F) 2020  9:05 AM   Pulse     Resp 20 2020  9:05 AM   SpO2 100 % 2020  9:05 AM   Temp src     NIBP 144/60 2020  8:57 AM   Pulse 61 2020  9:02 AM   SpO2 100 % 2020  9:02 AM   Resp     Temp     Ht Rate 61 2020  9:02 AM   Temp 2           Electronically Signed By: Nicola Webb MD, 2020, 12:20 PM

## 2020-08-11 NOTE — ANESTHESIA CARE TRANSFER NOTE
Patient: Supriya Herr    Procedure(s):  PHACOEMULSIFICATION, CATARACT, WITH INTRAOCULAR LENS IMPLANT    Diagnosis: Combined forms of age-related cataract of right eye [H25.811]  Diagnosis Additional Information: No value filed.    Anesthesia Type:   MAC     Note:  Anesthesia Care Transfer Notewriter    Vitals: (Last set prior to Anesthesia Care Transfer)    CRNA VITALS  8/11/2020 0833 - 8/11/2020 0933      8/11/2020             EKG:  Sinus bradycardia                Electronically Signed By: Nicola Webb MD  August 11, 2020  12:21 PM

## 2020-08-11 NOTE — OR NURSING
Discharge instructions reviewed with daughter Caroline via phone. No questions or concerns at this time.

## 2020-08-12 ENCOUNTER — OFFICE VISIT (OUTPATIENT)
Dept: OPHTHALMOLOGY | Facility: CLINIC | Age: 70
End: 2020-08-12
Attending: OPHTHALMOLOGY
Payer: MEDICARE

## 2020-08-12 DIAGNOSIS — Z48.810 AFTERCARE FOLLOWING SURGERY OF A SENSORY ORGAN: Primary | ICD-10-CM

## 2020-08-12 PROCEDURE — G0463 HOSPITAL OUTPT CLINIC VISIT: HCPCS | Mod: ZF

## 2020-08-12 ASSESSMENT — SLIT LAMP EXAM - LIDS
COMMENTS: NORMAL
COMMENTS: PROTECTIVE PTOSIS

## 2020-08-12 ASSESSMENT — REFRACTION_WEARINGRX
SPECS_TYPE: BIFOCAL
OS_CYLINDER: +2.00
OD_AXIS: 093
OD_ADD: +2.75
OD_CYLINDER: +2.25
OS_AXIS: 134
OD_SPHERE: -4.75
OS_SPHERE: -3.50
OS_ADD: +2.75

## 2020-08-12 ASSESSMENT — TONOMETRY
IOP_METHOD: ICARE
OS_IOP_MMHG: 10
OD_IOP_MMHG: 9

## 2020-08-12 ASSESSMENT — VISUAL ACUITY
CORRECTION_TYPE: GLASSES
METHOD: SNELLEN - LINEAR
OD_SC+: -2
OD_SC: 20/40
OS_CC: 20/30-2
OS_CC+: +1

## 2020-08-12 NOTE — NURSING NOTE
Chief Complaints and History of Present Illnesses   Patient presents with     Post Op (Ophthalmology) Right Eye     Chief Complaint(s) and History of Present Illness(es)     Post Op (Ophthalmology) Right Eye     Laterality: right eye    Onset: 1 day ago    Associated symptoms: Negative for floaters, flashes, pain with eye movement and eye pain    Pain scale: 0/10              Comments     POD#1 OD s/p CE/IOL phaco    Wore cerda shield all night, no problems sleeping.   Slight pain of R brow, attributed to tape.      Ocular meds: obtained but not instilled yet     Viky Lewis COT 2:48 PM August 12, 2020

## 2020-08-12 NOTE — PROGRESS NOTES
Postoperative day 1 status post PHACOEMULSIFICATION, CATARACT, WITH INTRAOCULAR LENS IMPLANT RIGHT EYE  8-11-20    Slept well  Retina attached  Doing well    Plan:  No heavy lifting   Beth shield at all times  Retina detachment and endophthalmitis precautions were discussed with the patient and was asked to return if any of the those occur    Medications to operative eye  Ketorolac (gray top) four times a day    Predforte  (pink top) four times a day  (shake the bottle before)  Ofloxacin (tan top) four times a day    Put the eyedrops 5 minutes a part  Eye shield or glasses at all times x 3 weeks  Sleep with the shield  No heavy lifting   Follow-up in one week  Retina detachment and endophthalmitis precautions were discussed with the patient (increased blurry vision, drainage, new flashes, floaters or a curtain in the visual field) and was asked to return if any of the those occur      Follow up in one week    Barry Haro MD  Ophthalmology PGY-3  [facility]     ~~~~~~~~~~~~~~~~~~~~~~~~~~~~~~~~~~   Complete documentation of historical and exam elements from today's encounter can be found in the full encounter summary report (not reduplicated in this progress note).  I personally obtained the chief complaint(s) and history of present illness.  I confirmed and edited as necessary the review of systems, past medical/surgical history, family history, social history, and examination findings as documented by others; and I examined the patient myself.  I personally reviewed the relevant tests, images, and reports as documented above.  I formulated and edited as necessary the assessment and plan and discussed the findings and management plan with the patient and family    Leanne Jett MD  .  Retina Service   Department of Ophthalmology and Visual Neurosciences   [facility]  Phone: [phone]   Fax: [phone]

## 2020-08-12 NOTE — PATIENT INSTRUCTIONS
Medications to operative eye  Ketorolac (gray top) four times a day    Predforte  (pink top) four times a day  (shake the bottle before)  Ofloxacin (tan top) four times a day    Put the eyedrops 5 minutes a part  Eye shield or glasses at all times x 3 weeks  Sleep with the shield  No heavy lifting

## 2020-08-19 ENCOUNTER — OFFICE VISIT (OUTPATIENT)
Dept: OPHTHALMOLOGY | Facility: CLINIC | Age: 70
End: 2020-08-19
Attending: OPHTHALMOLOGY
Payer: MEDICARE

## 2020-08-19 DIAGNOSIS — Z48.810 AFTERCARE FOLLOWING SURGERY OF A SENSORY ORGAN: Primary | ICD-10-CM

## 2020-08-19 PROCEDURE — G0463 HOSPITAL OUTPT CLINIC VISIT: HCPCS | Mod: ZF

## 2020-08-19 ASSESSMENT — TONOMETRY
OD_IOP_MMHG: 12
OS_IOP_MMHG: 12
IOP_METHOD: TONOPEN

## 2020-08-19 ASSESSMENT — REFRACTION_WEARINGRX
SPECS_TYPE: BIFOCAL
OD_ADD: +2.75
OS_AXIS: 134
OD_CYLINDER: +2.25
OD_AXIS: 093
OS_ADD: +2.75
OS_CYLINDER: +2.00
OD_SPHERE: -4.75
OS_SPHERE: -3.50

## 2020-08-19 ASSESSMENT — VISUAL ACUITY
OD_PH_SC+: +2
OD_PH_SC: 20/40
METHOD: SNELLEN - LINEAR
OD_SC+: -1
OD_SC: 20/40
OS_CC: 20/30
OS_CC+: -1

## 2020-08-19 ASSESSMENT — SLIT LAMP EXAM - LIDS
COMMENTS: NORMAL
COMMENTS: NORMAL

## 2020-08-19 NOTE — NURSING NOTE
Chief Complaints and History of Present Illnesses   Patient presents with     Post Op (Ophthalmology) Right Eye     Chief Complaint(s) and History of Present Illness(es)     Post Op (Ophthalmology) Right Eye     Pain scale: 0/10              Comments     POP right eye CE/IOL 08/11/2020.  She states she is seeing better since surgery.  She is using Ketorolac four times daily, Prednisolone four times daily and Ofloxacin four times daily.  Lucinda Lawson, COA, COA 3:16 PM 08/19/2020

## 2020-08-19 NOTE — PATIENT INSTRUCTIONS
1. Stop Ofloxacin (tan cap)  2. Taper Prednisolone (white cap) to 3x/day for 1 week, then 2x/day for 1 week, then 1x/day for 1 week, then stop  3. Taper Ketorolac (gray cap) to 3x/day for 1 week, then 2x/day for 1 week, then 1x/day for 1 week, then stop  4. Continue to wear the eye shield at bedtime for a total of 3 weeks  5. Okay to slowly resume normal activity   6. Follow up in 2-3 weeks for dilated eye exam and to check glasses prescription.

## 2020-08-19 NOTE — PROGRESS NOTES
Postoperative day 8 status post PHACOEMULSIFICATION, CATARACT, WITH INTRAOCULAR LENS IMPLANT 8-11-20    Slept well  Retina attached  Doing well    Plan:  No heavy lifting   Beth shield at all times  Retina detachment and endophthalmitis precautions were discussed with the patient and was asked to return if any of the those occur    Medications to operative eye  Stop Ofloxacin (tan cap)  Taper Prednisolone (white cap) to 3x/day for 1 week, then 2x/day for 1 week, then 1x/day for 1 week, then stop  Taper Ketorolac (gray cap) to 3x/day for 1 week, then 2x/day for 1 week, then 1x/day for 1 week, then stop  Put the eyedrops 5 minutes a part  Eye shield or glasses at all times x 3 weeks  Sleep with the shield  No heavy lifting   Follow-up in one week  Retina detachment and endophthalmitis precautions were discussed with the patient (increased blurry vision, drainage, new flashes, floaters or a curtain in the visual field) and was asked to return if any of the those occur    Follow up in 3 weeks for DFE, OCT, Optos and prescription     Barry Haro MD  Ophthalmology PGY-3  [facility]     ~~~~~~~~~~~~~~~~~~~~~~~~~~~~~~~~~~   Complete documentation of historical and exam elements from today's encounter can be found in the full encounter summary report (not reduplicated in this progress note).  I personally obtained the chief complaint(s) and history of present illness.  I confirmed and edited as necessary the review of systems, past medical/surgical history, family history, social history, and examination findings as documented by others; and I examined the patient myself.  I personally reviewed the relevant tests, images, and reports as documented above.  I formulated and edited as necessary the assessment and plan and discussed the findings and management plan with the patient and family    Leanne Jett MD  .  Retina Service   Department of Ophthalmology and Visual Neurosciences    [facility]  Phone: [phone]   Fax: [phone]

## 2020-09-02 ENCOUNTER — OFFICE VISIT (OUTPATIENT)
Dept: OPHTHALMOLOGY | Facility: CLINIC | Age: 70
End: 2020-09-02
Attending: OPHTHALMOLOGY
Payer: MEDICARE

## 2020-09-02 DIAGNOSIS — E11.3213 TYPE 2 DIABETES MELLITUS WITH MILD NONPROLIFERATIVE RETINOPATHY OF BOTH EYES AND MACULAR EDEMA, UNSPECIFIED WHETHER LONG TERM INSULIN USE (H): Primary | ICD-10-CM

## 2020-09-02 DIAGNOSIS — E11.311 DIABETIC MACULAR EDEMA (H): Primary | ICD-10-CM

## 2020-09-02 DIAGNOSIS — E83.39 HYPERPHOSPHATEMIA: ICD-10-CM

## 2020-09-02 PROCEDURE — 92015 DETERMINE REFRACTIVE STATE: CPT | Mod: GY,ZF

## 2020-09-02 PROCEDURE — 92134 CPTRZ OPH DX IMG PST SGM RTA: CPT | Mod: ZF | Performed by: OPHTHALMOLOGY

## 2020-09-02 PROCEDURE — G0463 HOSPITAL OUTPT CLINIC VISIT: HCPCS | Mod: ZF

## 2020-09-02 RX ORDER — SEVELAMER HYDROCHLORIDE 800 MG/1
TABLET, FILM COATED ORAL
Qty: 720 TABLET | Refills: 3 | Status: SHIPPED | OUTPATIENT
Start: 2020-09-02 | End: 2020-11-04

## 2020-09-02 ASSESSMENT — REFRACTION_MANIFEST
OS_AXIS: 135
OD_CYLINDER: +1.00
OS_CYLINDER: +1.75
OD_SPHERE: -1.00
OS_SPHERE: -4.00
OD_AXIS: 075

## 2020-09-02 ASSESSMENT — VISUAL ACUITY
METHOD: SNELLEN - LINEAR
OD_PH_SC+: +2
OD_PH_SC: 20/30
OS_CC+: -2
OS_CC: 20/25
OD_SC: 20/30

## 2020-09-02 ASSESSMENT — REFRACTION_WEARINGRX
OS_CYLINDER: +1.75
OS_SPHERE: -3.50
OD_AXIS: 093
OD_SPHERE: -5.25
OD_CYLINDER: +1.50

## 2020-09-02 ASSESSMENT — TONOMETRY
IOP_METHOD: TONOPEN
OS_IOP_MMHG: 11
OD_IOP_MMHG: 09

## 2020-09-02 ASSESSMENT — SLIT LAMP EXAM - LIDS
COMMENTS: NORMAL
COMMENTS: NORMAL

## 2020-09-02 ASSESSMENT — CUP TO DISC RATIO
OS_RATIO: 0.3
OD_RATIO: 0.3

## 2020-09-02 NOTE — PATIENT INSTRUCTIONS
Medications to operative eye  Taper Prednisolone (white cap)  1x/day for 1 week, then stop  Taper Ketorolac (gray cap)1x/day for 1 week, then stop  Put the eyedrops 5 minutes a part  Retina detachment and endophthalmitis precautions were discussed with the patient (increased blurry vision, drainage, new flashes, floaters or a curtain in the visual field) and was asked to return if any of the those occur    Follow up in 8 weeks for DFE, OCT, Optos and fluorescein angiography transits right eye   Prescription given today

## 2020-09-02 NOTE — NURSING NOTE
Chief Complaints and History of Present Illnesses   Patient presents with     Post Op (Ophthalmology) Right Eye     Chief Complaint(s) and History of Present Illness(es)     Post Op (Ophthalmology) Right Eye     Laterality: right eye    Associated symptoms: itching    Pain scale: 0/10              Comments     Postoperative week 3 status post PHACOEMULSIFICATION, CATARACT, WITH INTRAOCULAR LENS IMPLANT 8-11-20  Doing good w vision   Ketorolac took last drop today  Blood sugar up and down 122 today  Lisbeth KELLY 3:24 PM September 2, 2020       Lisbeth KELLY 3:23 PM September 2, 2020

## 2020-09-02 NOTE — PROGRESS NOTES
status post PHACOEMULSIFICATION, CATARACT, WITH INTRAOCULAR LENS IMPLANT 8-11-20 right eye     Retina attached  Doing well    OCULAR IMAGING  OCT 9-2-20  right eye: few foveal cystic interval improved IRF; retina thinning and partial thickness Macula hole. Stable   left eye: temporal retina thinning; no cystic changes      Plan:    Medications to operative eye  Taper Prednisolone (white cap)  1x/day for 1 week, then stop  Taper Ketorolac (gray cap)1x/day for 1 week, then stop  Put the eyedrops 5 minutes a part  Retina detachment and endophthalmitis precautions were discussed with the patient (increased blurry vision, drainage, new flashes, floaters or a curtain in the visual field) and was asked to return if any of the those occur    Follow up in 8 weeks for DFE, OCT, Optos and fluorescein angiography transits right eye   Prescription given today       ~~~~~~~~~~~~~~~~~~~~~~~~~~~~~~~~~~   Complete documentation of historical and exam elements from today's encounter can be found in the full encounter summary report (not reduplicated in this progress note).  I personally obtained the chief complaint(s) and history of present illness.  I confirmed and edited as necessary the review of systems, past medical/surgical history, family history, social history, and examination findings as documented by others; and I examined the patient myself.  I personally reviewed the relevant tests, images, and reports as documented above.  I formulated and edited as necessary the assessment and plan and discussed the findings and management plan with the patient and family    Leanne Jett MD   of Ophthalmology.  Retina Service   Department of Ophthalmology and Visual Neurosciences   [facility]  Phone: [phone]   Fax: [phone]

## 2020-09-24 ENCOUNTER — NURSE TRIAGE (OUTPATIENT)
Dept: NURSING | Facility: CLINIC | Age: 70
End: 2020-09-24

## 2020-09-24 ENCOUNTER — TELEPHONE (OUTPATIENT)
Dept: TRANSPLANT | Facility: CLINIC | Age: 70
End: 2020-09-24

## 2020-09-24 ENCOUNTER — MYC MEDICAL ADVICE (OUTPATIENT)
Dept: FAMILY MEDICINE | Facility: CLINIC | Age: 70
End: 2020-09-24

## 2020-09-24 ENCOUNTER — TELEPHONE (OUTPATIENT)
Dept: INTERVENTIONAL RADIOLOGY/VASCULAR | Facility: CLINIC | Age: 70
End: 2020-09-24

## 2020-09-24 DIAGNOSIS — T82.49XA CLOTTED DIALYSIS ACCESS, INITIAL ENCOUNTER (H): ICD-10-CM

## 2020-09-24 DIAGNOSIS — N18.6 ESRD ON DIALYSIS (H): Primary | ICD-10-CM

## 2020-09-24 DIAGNOSIS — T82.9XXD COMPLICATION OF VASCULAR ACCESS FOR DIALYSIS, SUBSEQUENT ENCOUNTER: ICD-10-CM

## 2020-09-24 DIAGNOSIS — Z99.2 ESRD ON DIALYSIS (H): Primary | ICD-10-CM

## 2020-09-24 DIAGNOSIS — T82.898A PROBLEM WITH DIALYSIS ACCESS, INITIAL ENCOUNTER (H): Primary | ICD-10-CM

## 2020-09-24 RX ORDER — HEPARIN SODIUM 200 [USP'U]/100ML
1 INJECTION, SOLUTION INTRAVENOUS CONTINUOUS PRN
Status: CANCELLED | OUTPATIENT
Start: 2020-09-24

## 2020-09-24 NOTE — TELEPHONE ENCOUNTER
Daughter Caroline Gray is pt's rep and handles all her medical issues. Caroline gray states today she rec'd call from dialysis stating pt's AV fistula is clotted so she will require surgery tomorrow to fix /replace this. They were told surgery is scheduled for 8 am, arrive at 6:30 am and that they would get call tonight w/ pre-op instructions for meds, insulin, etc. No call rec'd yet. Advised Caroline Gray the surgery dept is likely still working on calls even after closing at 5pm. Advised c/b if no call by 7-8 pm and then we will page on-call provider.

## 2020-09-24 NOTE — TELEPHONE ENCOUNTER
Daughter Caroline Gray is pt's rep and handles all her medical issues. Caroline gray states today she rec'd call from dialysis stating pt's AV fistula is clotted so she will require surgery tomorrow to fix /replace this. They were told surgery is scheduled for 8 am, arrive at 6:30 am and that they would get call tonight w/ pre-op instructions for meds, insulin, etc. No call rec'd yet. Advised Caroline Gray the surgery dept is likely still working on calls even after closing at 5pm. Advised c/b if no call by 7-8 pm and then we will page on-call provider. Advised check pt's phone as someone may have called her by mistake (pt never answers phone)

## 2020-09-24 NOTE — TELEPHONE ENCOUNTER
Additional Information    Negative: [1] Caller is not with the adult (patient) AND [2] reporting urgent symptoms    Negative: Lab result questions    Negative: Medication questions    Negative: Caller can't be reached by phone    Negative: Caller has already spoken to PCP or another triager    Negative: RN needs further essential information from caller in order to complete triage    Negative: Requesting regular office appointment    Negative: [1] Caller requesting NON-URGENT health information AND [2] PCP's office is the best resource    Negative: Health Information question, no triage required and triager able to answer question    General information question, no triage required and triager able to answer question    Protocols used: INFORMATION ONLY CALL-A-

## 2020-09-24 NOTE — TELEPHONE ENCOUNTER
Received a call from [facility] dialysis RN Karley, who reports that patient's LUE AV graft clotted today prior to dialysis run. Last dialysis on 9/22/2020, K+ 4.7 on 9/17/2020. Writer will place order and IR  will schedule IR declot AV graft tomorrow. A  will call patient's daughter for appt.  Dialysis RN verbalized acceptance and understanding of plan.  Task sent to IR and [facility]  for appt. IR was paged as well.

## 2020-09-25 ENCOUNTER — HOSPITAL ENCOUNTER (OUTPATIENT)
Facility: CLINIC | Age: 70
Discharge: HOME | End: 2020-09-25
Attending: INTERNAL MEDICINE | Admitting: RADIOLOGY
Payer: MEDICARE

## 2020-09-25 ENCOUNTER — APPOINTMENT (OUTPATIENT)
Dept: INTERVENTIONAL RADIOLOGY/VASCULAR | Facility: CLINIC | Age: 70
End: 2020-09-25
Attending: CLINICAL NURSE SPECIALIST
Payer: MEDICARE

## 2020-09-25 ENCOUNTER — APPOINTMENT (OUTPATIENT)
Dept: MEDSURG UNIT | Facility: CLINIC | Age: 70
End: 2020-09-25
Attending: INTERNAL MEDICINE
Payer: MEDICARE

## 2020-09-25 ENCOUNTER — APPOINTMENT (OUTPATIENT)
Dept: ULTRASOUND IMAGING | Facility: CLINIC | Age: 70
End: 2020-09-25
Attending: INTERNAL MEDICINE
Payer: MEDICARE

## 2020-09-25 VITALS
HEART RATE: 65 BPM | BODY MASS INDEX: 32.14 KG/M2 | TEMPERATURE: 99.3 F | RESPIRATION RATE: 16 BRPM | HEIGHT: 66 IN | SYSTOLIC BLOOD PRESSURE: 157 MMHG | OXYGEN SATURATION: 98 % | WEIGHT: 200 LBS | DIASTOLIC BLOOD PRESSURE: 64 MMHG

## 2020-09-25 DIAGNOSIS — N18.6 ESRD ON DIALYSIS (H): ICD-10-CM

## 2020-09-25 DIAGNOSIS — Z99.2 ESRD ON DIALYSIS (H): ICD-10-CM

## 2020-09-25 DIAGNOSIS — T82.49XA CLOTTED DIALYSIS ACCESS, INITIAL ENCOUNTER (H): ICD-10-CM

## 2020-09-25 DIAGNOSIS — T82.9XXD COMPLICATION OF VASCULAR ACCESS FOR DIALYSIS, SUBSEQUENT ENCOUNTER: ICD-10-CM

## 2020-09-25 LAB
ANION GAP SERPL CALCULATED.3IONS-SCNC: 10 MMOL/L (ref 3–14)
BUN SERPL-MCNC: 81 MG/DL (ref 7–30)
CALCIUM SERPL-MCNC: 8.8 MG/DL (ref 8.5–10.1)
CHLORIDE SERPL-SCNC: 108 MMOL/L (ref 94–109)
CO2 SERPL-SCNC: 20 MMOL/L (ref 20–32)
CREAT SERPL-MCNC: 6.87 MG/DL (ref 0.52–1.04)
ERYTHROCYTE [DISTWIDTH] IN BLOOD BY AUTOMATED COUNT: 13.8 % (ref 10–15)
GFR SERPL CREATININE-BSD FRML MDRD: 5 ML/MIN/{1.73_M2}
GLUCOSE BLDC GLUCOMTR-MCNC: 84 MG/DL (ref 70–99)
GLUCOSE BLDC GLUCOMTR-MCNC: 86 MG/DL (ref 70–99)
GLUCOSE SERPL-MCNC: 89 MG/DL (ref 70–99)
HCT VFR BLD AUTO: 38 % (ref 35–47)
HGB BLD-MCNC: 11.4 G/DL (ref 11.7–15.7)
INR PPP: 1.14 (ref 0.86–1.14)
MCH RBC QN AUTO: 29.6 PG (ref 26.5–33)
MCHC RBC AUTO-ENTMCNC: 30 G/DL (ref 31.5–36.5)
MCV RBC AUTO: 99 FL (ref 78–100)
PLATELET # BLD AUTO: 149 10E9/L (ref 150–450)
POTASSIUM SERPL-SCNC: 5.3 MMOL/L (ref 3.4–5.3)
RBC # BLD AUTO: 3.85 10E12/L (ref 3.8–5.2)
SODIUM SERPL-SCNC: 137 MMOL/L (ref 133–144)
WBC # BLD AUTO: 8 10E9/L (ref 4–11)

## 2020-09-25 PROCEDURE — 25000128 H RX IP 250 OP 636: Performed by: RADIOLOGY

## 2020-09-25 PROCEDURE — C1725 CATH, TRANSLUMIN NON-LASER: HCPCS

## 2020-09-25 PROCEDURE — 25000125 ZZHC RX 250: Performed by: RADIOLOGY

## 2020-09-25 PROCEDURE — 27210732 ZZH ACCESSORY CR1

## 2020-09-25 PROCEDURE — 36906 THRMBC/NFS DIALYSIS CIRCUIT: CPT

## 2020-09-25 PROCEDURE — 27211134 ZZH SHEATH CR2

## 2020-09-25 PROCEDURE — 93971 EXTREMITY STUDY: CPT | Mod: RT

## 2020-09-25 PROCEDURE — 25800030 ZZH RX IP 258 OP 636: Performed by: RADIOLOGY

## 2020-09-25 PROCEDURE — C1769 GUIDE WIRE: HCPCS

## 2020-09-25 PROCEDURE — 25500064 ZZH RX 255 OP 636: Performed by: RADIOLOGY

## 2020-09-25 PROCEDURE — C1757 CATH, THROMBECTOMY/EMBOLECT: HCPCS

## 2020-09-25 PROCEDURE — 80048 BASIC METABOLIC PNL TOTAL CA: CPT | Mod: AY | Performed by: STUDENT IN AN ORGANIZED HEALTH CARE EDUCATION/TRAINING PROGRAM

## 2020-09-25 PROCEDURE — 99153 MOD SED SAME PHYS/QHP EA: CPT

## 2020-09-25 PROCEDURE — C1874 STENT, COATED/COV W/DEL SYS: HCPCS

## 2020-09-25 PROCEDURE — 27210775 ZZH KIT CR11

## 2020-09-25 PROCEDURE — 27211268 ZZ H BALLOON (NON-PTA) CR8

## 2020-09-25 PROCEDURE — 25000125 ZZHC RX 250: Performed by: STUDENT IN AN ORGANIZED HEALTH CARE EDUCATION/TRAINING PROGRAM

## 2020-09-25 PROCEDURE — 99152 MOD SED SAME PHYS/QHP 5/>YRS: CPT

## 2020-09-25 PROCEDURE — 27210889 ZZH ACCESSORY CR8

## 2020-09-25 PROCEDURE — 40000168 ZZH STATISTIC PP CARE STAGE 3

## 2020-09-25 PROCEDURE — C1887 CATHETER, GUIDING: HCPCS

## 2020-09-25 PROCEDURE — 27210845 ZZH DEVICE INFLATION CR5

## 2020-09-25 PROCEDURE — 85027 COMPLETE CBC AUTOMATED: CPT | Performed by: STUDENT IN AN ORGANIZED HEALTH CARE EDUCATION/TRAINING PROGRAM

## 2020-09-25 PROCEDURE — 27210908 ZZH NEEDLE CR4

## 2020-09-25 PROCEDURE — 82962 GLUCOSE BLOOD TEST: CPT | Mod: GZ

## 2020-09-25 PROCEDURE — 25000128 H RX IP 250 OP 636: Performed by: STUDENT IN AN ORGANIZED HEALTH CARE EDUCATION/TRAINING PROGRAM

## 2020-09-25 PROCEDURE — 85610 PROTHROMBIN TIME: CPT | Performed by: STUDENT IN AN ORGANIZED HEALTH CARE EDUCATION/TRAINING PROGRAM

## 2020-09-25 RX ORDER — HEPARIN SODIUM 200 [USP'U]/100ML
1 INJECTION, SOLUTION INTRAVENOUS CONTINUOUS PRN
Status: DISCONTINUED | OUTPATIENT
Start: 2020-09-25 | End: 2020-09-25 | Stop reason: HOSPADM

## 2020-09-25 RX ORDER — ATORVASTATIN CALCIUM 20 MG/1
20 TABLET, FILM COATED ORAL DAILY
COMMUNITY
End: 2021-01-17

## 2020-09-25 RX ORDER — IODIXANOL 320 MG/ML
100 INJECTION, SOLUTION INTRAVASCULAR ONCE
Status: COMPLETED | OUTPATIENT
Start: 2020-09-25 | End: 2020-09-25

## 2020-09-25 RX ORDER — FENTANYL CITRATE 50 UG/ML
25-50 INJECTION, SOLUTION INTRAMUSCULAR; INTRAVENOUS EVERY 5 MIN PRN
Status: DISCONTINUED | OUTPATIENT
Start: 2020-09-25 | End: 2020-09-25 | Stop reason: HOSPADM

## 2020-09-25 RX ORDER — CLINDAMYCIN PHOSPHATE 900 MG/50ML
900 INJECTION, SOLUTION INTRAVENOUS EVERY 8 HOURS
Status: DISCONTINUED | OUTPATIENT
Start: 2020-09-25 | End: 2020-09-25 | Stop reason: HOSPADM

## 2020-09-25 RX ORDER — NICOTINE POLACRILEX 4 MG
15-30 LOZENGE BUCCAL
Status: DISCONTINUED | OUTPATIENT
Start: 2020-09-25 | End: 2020-09-25 | Stop reason: HOSPADM

## 2020-09-25 RX ORDER — HEPARIN SODIUM 1000 [USP'U]/ML
2000 INJECTION, SOLUTION INTRAVENOUS; SUBCUTANEOUS ONCE
Status: COMPLETED | OUTPATIENT
Start: 2020-09-25 | End: 2020-09-25

## 2020-09-25 RX ORDER — HEPARIN SODIUM 1000 [USP'U]/ML
7000 INJECTION, SOLUTION INTRAVENOUS; SUBCUTANEOUS ONCE
Status: COMPLETED | OUTPATIENT
Start: 2020-09-25 | End: 2020-09-25

## 2020-09-25 RX ORDER — FLUMAZENIL 0.1 MG/ML
0.2 INJECTION, SOLUTION INTRAVENOUS
Status: DISCONTINUED | OUTPATIENT
Start: 2020-09-25 | End: 2020-09-25 | Stop reason: HOSPADM

## 2020-09-25 RX ORDER — DEXTROSE MONOHYDRATE 25 G/50ML
25-50 INJECTION, SOLUTION INTRAVENOUS
Status: DISCONTINUED | OUTPATIENT
Start: 2020-09-25 | End: 2020-09-25 | Stop reason: HOSPADM

## 2020-09-25 RX ORDER — NALOXONE HYDROCHLORIDE 0.4 MG/ML
.1-.4 INJECTION, SOLUTION INTRAMUSCULAR; INTRAVENOUS; SUBCUTANEOUS
Status: DISCONTINUED | OUTPATIENT
Start: 2020-09-25 | End: 2020-09-25 | Stop reason: HOSPADM

## 2020-09-25 RX ADMIN — LIDOCAINE HYDROCHLORIDE 5 ML: 10 INJECTION, SOLUTION EPIDURAL; INFILTRATION; INTRACAUDAL; PERINEURAL at 10:59

## 2020-09-25 RX ADMIN — FENTANYL CITRATE 200 MCG: 50 INJECTION, SOLUTION INTRAMUSCULAR; INTRAVENOUS at 09:00

## 2020-09-25 RX ADMIN — CLINDAMYCIN PHOSPHATE 900 MG: 900 INJECTION, SOLUTION INTRAVENOUS at 11:07

## 2020-09-25 RX ADMIN — HEPARIN SODIUM 2000 UNITS: 1000 INJECTION, SOLUTION INTRAVENOUS; SUBCUTANEOUS at 10:55

## 2020-09-25 RX ADMIN — MIDAZOLAM 4 MG: 1 INJECTION INTRAMUSCULAR; INTRAVENOUS at 09:03

## 2020-09-25 RX ADMIN — HEPARIN SODIUM 1 BAG: 200 INJECTION, SOLUTION INTRAVENOUS at 09:04

## 2020-09-25 RX ADMIN — HEPARIN SODIUM 7000 UNITS: 1000 INJECTION, SOLUTION INTRAVENOUS; SUBCUTANEOUS at 10:03

## 2020-09-25 RX ADMIN — ALTEPLASE 8 MG/HR: 2.2 INJECTION, POWDER, LYOPHILIZED, FOR SOLUTION INTRAVENOUS at 10:02

## 2020-09-25 RX ADMIN — IODIXANOL 60 ML: 320 INJECTION, SOLUTION INTRAVASCULAR at 11:23

## 2020-09-25 ASSESSMENT — PAIN DESCRIPTION - DESCRIPTORS: DESCRIPTORS: ACHING

## 2020-09-25 ASSESSMENT — MIFFLIN-ST. JEOR: SCORE: 1438.94

## 2020-09-25 NOTE — IP AVS SNAPSHOT
MRN:[id number]                      After Visit Summary   9/25/2020    Supriya Herr    MRN: [id number]           Visit Information        Department      9/25/2020  6:31 AM [address] [facility]          Review of your medicines      UNREVIEWED medicines. Ask your doctor about these medicines       Dose / Directions   albuterol 108 (90 Base) MCG/ACT inhaler  Commonly known as:  PROAIR HFA/PROVENTIL HFA/VENTOLIN HFA  Used for:  Cough      Dose:  2 puff  Inhale 2 puffs into the lungs every 6 hours as needed for shortness of breath / dyspnea or wheezing  Quantity:  3 Inhaler  Refills:  1     amLODIPine 5 MG tablet  Commonly known as:  NORVASC  Used for:  Hypertension goal BP (blood pressure) < 140/90      Dose:  5 mg  Take 1 tablet (5 mg) by mouth 2 times daily  Quantity:  180 tablet  Refills:  3     apremilast 30 MG tablet  Commonly known as:  OTEZLA  Used for:  Inverse psoriasis      Dose:  30 mg  Take 1 tablet (30 mg) by mouth daily  Quantity:  90 tablet  Refills:  3     atorvastatin 20 MG tablet  Commonly known as:  LIPITOR      Dose:  20 mg  Take 20 mg by mouth daily  Refills:  0     betamethasone dipropionate 0.05 % external ointment  Commonly known as:  DIPROSONE  Used for:  Inverse psoriasis      Use twice daily on the weekend to inflamed areas  Quantity:  50 g  Refills:  5     carvedilol 25 MG tablet  Commonly known as:  COREG  Used for:  Essential hypertension      Dose:  25 mg  Take 1 tablet (25 mg) by mouth 2 times daily (with meals)  Quantity:  180 tablet  Refills:  3     desonide 0.05 % external ointment  Commonly known as:  DESOWEN      Apply topically as needed  Refills:  0     furosemide 80 MG tablet  Commonly known as:  LASIX  Used for:  CKD (chronic kidney disease) stage 5, GFR less than 15 ml/min (H), Anemia due to stage 5 chronic kidney disease, 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 (H)      Dose:  80 mg  Take 1 tablet (80 mg) by mouth 2 times daily  Quantity:  180 tablet  Refills:  3      insulin glargine 100 UNIT/ML pen  Used for:  Type 2 diabetes mellitus with diabetic neuropathy, with long-term current use of insulin (H)      Dose:  18 Units  Inject 18 Units Subcutaneous At Bedtime Pt to take 14 units if pt is NPO for surgery  Quantity:  2 mL  Refills:  3     ketorolac tromethamine 0.4 % Soln ophthalmic solution  Commonly known as:  ACULAR-LS  Used for:  Combined forms of age-related cataract of right eye, Aftercare following surgery of a sense organ      Dose:  1 drop  Apply 1 drop to eye 4 times daily Instill into operative eye(s) per physician instructions.  Quantity:  5 mL  Refills:  0     moxifloxacin 0.5 % ophthalmic solution  Commonly known as:  VIGAMOX  Used for:  Combined forms of age-related cataract of right eye, Aftercare following surgery of a sense organ      Dose:  1 drop  Apply 1 drop to eye 4 times daily Instill into operative eye(s) per physician instructions  Quantity:  3 mL  Refills:  0     NovoLOG FLEXPEN 100 UNIT/ML pen  Used for:  Type 2 diabetes mellitus with hyperglycemia, with long-term current use of insulin (H)  Generic drug:  insulin aspart      INJECT 4 UNITS SUBCUTANEOUSLY WITH BREAKFAST, LUNCH AND DINNER.  Quantity:  15 mL  Refills:  3     prednisoLONE acetate 1 % ophthalmic suspension  Commonly known as:  PRED FORTE  Used for:  Combined forms of age-related cataract of right eye, Aftercare following surgery of a sense organ      Dose:  1 drop  Apply 1 drop to eye 4 times daily Instill into operative eye(s) per physician instructions.  Quantity:  5 mL  Refills:  0     Renvela 800 MG tablet  Generic drug:  sevelamer carbonate      Dose:  1 tablet  Take 1 tablet by mouth 3 times daily  Refills:  0     sertraline 50 MG tablet  Commonly known as:  ZOLOFT  Used for:  NICOLE (generalized anxiety disorder)      Dose:  50 mg  Take 1 tablet (50 mg) by mouth daily  Quantity:  90 tablet  Refills:  3     sevelamer HCl 800 MG tablet  Commonly known as:  RENAGEL  Used for:   Hyperphosphatemia      Take 2 tablets (1,600 mg) by mouth 3 times daily (with meals). May also take 1 tablet (800 mg) 2 times daily as needed (with snacks).  Quantity:  720 tablet  Refills:  3     vitamin D3 50 mcg (2000 units) tablet  Commonly known as:  CHOLECALCIFEROL  Used for:  Vitamin D deficiency      Dose:  2,000 Units  Take 1 tablet (2,000 Units) by mouth daily  Quantity:  100 tablet  Refills:  3        CONTINUE these medicines which have NOT CHANGED       Dose / Directions   blood glucose lancets standard  Commonly known as:  NO BRAND SPECIFIED  Used for:  Type 2 diabetes mellitus with hyperglycemia, with long-term current use of insulin (H)      Use to test blood sugar 3 to 4  times daily or as directed.  Quantity:  400 each  Refills:  3     blood glucose monitoring meter device kit  Commonly known as:  NO BRAND SPECIFIED  Used for:  Type 2 diabetes mellitus with hyperglycemia, with long-term current use of insulin (H)      Use to test blood sugar 3 to 4 times daily or as directed.  Quantity:  1 kit  Refills:  0     * blood glucose test strip  Commonly known as:  ONETOUCH ULTRA  Used for:  Type 2 diabetes mellitus with diabetic nephropathy, with long-term current use of insulin (H)      TEST YOUR BLOOD SUGAR 3-4 TIMES PER DAY.  Quantity:  400 strip  Refills:  1     * blood glucose test strip  Commonly known as:  NO BRAND SPECIFIED  Used for:  Type 2 diabetes mellitus with hyperglycemia, with long-term current use of insulin (H)      Use to test blood sugar 4 times daily or as directed.  Quantity:  400 strip  Refills:  2     FreeStyle Avery 14 Day Sensor Misc  Used for:  Type 2 diabetes mellitus with diabetic neuropathy, with long-term current use of insulin (H)      Dose:  1 each  1 each every 14 days  Quantity:  6 each  Refills:  3     FreeStyle Avery [place] Emma  Used for:  Type 2 diabetes mellitus with diabetic neuropathy, with long-term current use of insulin (H)      Dose:  1 each  1 each See Admin  "Instructions Use per 's instructions to monitor glucose continuously.  Quantity:  1 Device  Refills:  0     insulin pen needle 31G X 6 MM miscellaneous  Commonly known as:  ULTICARE MINI  Used for:  Type 2 diabetes, HbA1c goal < 7% (H)      Use daily or as directed.  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, unspecified laterality, subsequent encounter, CKD (chronic kidney disease) stage 3, GFR 30-59 ml/min (H), Type 2 diabetes mellitus with stage 3 chronic kidney disease, without long-term current use of insulin (H)      1 wheelchair  Quantity:  1 Device  Refills:  0     order for DME  Used for:  Seasonal affective disorder (H)      1 SAD light  Quantity:  1 Device  Refills:  1     order for DME  Used for:  CKD (chronic kidney disease) stage 5, GFR less than 15 ml/min (H), Immobility, 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 192#  Height 5'5\"  99 months/Lifetime  Quantity:  1 Units  Refills:  0         * This list has 2 medication(s) that are the same as other medications prescribed for you. Read the directions carefully, and ask your doctor or other care provider to review them with you.                  Protect others around you: Learn how to safely use, store and throw away your medicines at www.disposemymeds.org.       Follow-ups after your visit       Care Instructions       Further instructions from your care team         [facility]      Interventional Radiology  Discharge Instructions Following Fistulogram      ? You may resume normal activities as tolerated, but avoid any strenuous activity or heavy lifting (>10 lbs.) involving your access arm.    ? Elevate and rest your arm as much as possible for the first " 24 hours after the procedure.  This will promote healing and reduce swelling.     ? If bleeding or oozing should occur, apply fingertip pressure to puncture site to control bleeding, but avoid excessive pressure as this may clot off the fistula.  Hold light pressure for 10 minutes, or until bleeding/oozing is controlled.  If excessive bleeding is noted or if you are having difficulty controlling the bleeding with direct pressure, call 911.     ? If you develop fever, chills, excessive pain/tenderness or drainage at the puncture site, or have questions call your Doctor or Dialysis Center.     ? If you received sedation for your procedure: An adult should stay with you for 24 hours, Do Not drive a motor vehicle, operate machinery, or drink alcoholic beverages for 24 hours.     ? You may resume your normal medications immediately    ? If you notice sudden loss of pulse or thrill (buzzing feeling) in your fistula, contact your Doctor or Dialysis unit immediately.         [facility] INTERVENTIONAL RADIOLOGY DEPARTMENT  Procedure Physician: Dr. Jenkins and Dr. Domingo                                                       Date of procedure: September 25, 2020  Telephone Numbers: [phone] Monday-Friday 8:00 am to 4:30 pm  [phone] After 4:30 pm Monday-Friday, Weekends & Holidays.   Ask for the Interventional Radiologist on call.  Someone is on call 24 hrs/day  [facility] toll free number: [phone] Monday-Friday 8:00 am to 4:30 pm  [facility] Emergency Dept: [phone]            Additional Information About Your Visit       [employer] [facility] Information    [employer] gives you secure access to your electronic health record. If you see a primary care provider, you can also send messages to your care team and make appointments. If you have questions, please call your primary care clinic.  If you do not have a primary care provider, please call [phone] and they will assist you.       Care EveryWhere ID    This is your Care EveryWhere  "ID. This could be used by other organizations to access your [facility] medical records  [id number]       Your Vitals Were  Most recent update: 9/25/2020  1:17 PM    Blood Pressure   155/60   (BP Location: Right arm)          Pulse   65          Temperature   99.3  F (37.4  C) (Oral)          Respirations   16          Height   1.676 m (5' 6\")             Weight   90.7 kg (200 lb)    Pulse Oximetry   97%    BMI (Body Mass Index)   32.28 kg/m           Primary Care Provider Office Phone # Fax #    Noam Luis Jackson [phone]      Equal Access to Services    [facility]: Hadii aad indira hadasho Soomaali, waaxda luqadaha, qaybta kaalmada adeegyada, madeline sood . So [facility] [phone].    ATENCIÓN: Si habla español, tiene a santoro disposición servicios gratuitos de asistencia lingüística. Llame al [phone].    We comply with applicable federal and state civil rights laws, including the [place] Human Rights Act. We do not discriminate on the basis of race, color, creed, Pentecostalism, national origin, marital status, age, disability, sex, sexual orientation, or gender identity.       Thank you!    Thank you for choosing [facility] for your care. Our goal is always to provide you with excellent care. Hearing back from our patients is one way we can continue to improve our services. Please take a few minutes to complete the written survey that you may receive in the mail after you visit with us. Thank you!            Medication List      Medications          Morning Afternoon Evening Bedtime As Needed    blood glucose lancets standard  Also known as:  NO BRAND SPECIFIED  INSTRUCTIONS:  Use to test blood sugar 3 to 4  times daily or as directed.                     blood glucose monitoring meter device kit  Also known as:  NO BRAND SPECIFIED  INSTRUCTIONS:  Use to test blood sugar 3 to 4 times daily or as directed.                     * blood glucose test strip  Also known as: " " ONETOUCH ULTRA  INSTRUCTIONS:  TEST YOUR BLOOD SUGAR 3-4 TIMES PER DAY.                     * blood glucose test strip  Also known as:  NO BRAND SPECIFIED  INSTRUCTIONS:  Use to test blood sugar 4 times daily or as directed.                     FreeStyle Avery 14 Day Sensor Misc  INSTRUCTIONS:  1 each every 14 days                     FreeStyle Avery [place] Emma  INSTRUCTIONS:  1 each See Admin Instructions Use per 's instructions to monitor glucose continuously.                     insulin pen needle 31G X 6 MM miscellaneous  Also known as:  ULTICARE MINI  INSTRUCTIONS:  Use daily or as directed.                     order for DME  INSTRUCTIONS:  1 wheelchair                     order for DME  INSTRUCTIONS:  1 SAD light                     order for DME  INSTRUCTIONS:  Equipment being ordered: mattress overlay for hospital bed  Wt. 192#  Height 5'5\"  99 months/Lifetime                        * This list has 2 medication(s) that are the same as other medications prescribed for you. Read the directions carefully, and ask your doctor or other care provider to review them with you.            ASK your doctor about these medications          Morning Afternoon Evening Bedtime As Needed    albuterol 108 (90 Base) MCG/ACT inhaler  Also known as:  PROAIR HFA/PROVENTIL HFA/VENTOLIN HFA  INSTRUCTIONS:  Inhale 2 puffs into the lungs every 6 hours as needed for shortness of breath / dyspnea or wheezing  Doctor's comments:  Pharmacy may dispense brand covered by insurance (Proair, or proventil or ventolin or generic albuterol inhaler)                     amLODIPine 5 MG tablet  Also known as:  NORVASC  INSTRUCTIONS:  Take 1 tablet (5 mg) by mouth 2 times daily                     apremilast 30 MG tablet  Also known as:  OTEZLA  INSTRUCTIONS:  Take 1 tablet (30 mg) by mouth daily                     atorvastatin 20 MG tablet  Also known as:  LIPITOR  INSTRUCTIONS:  Take 20 mg by mouth daily                     " betamethasone dipropionate 0.05 % external ointment  Also known as:  DIPROSONE  INSTRUCTIONS:  Use twice daily on the weekend to inflamed areas                     carvedilol 25 MG tablet  Also known as:  COREG  INSTRUCTIONS:  Take 1 tablet (25 mg) by mouth 2 times daily (with meals)                     desonide 0.05 % external ointment  Also known as:  DESOWEN  INSTRUCTIONS:  Apply topically as needed                     furosemide 80 MG tablet  Also known as:  LASIX  INSTRUCTIONS:  Take 1 tablet (80 mg) by mouth 2 times daily                     insulin glargine 100 UNIT/ML pen  INSTRUCTIONS:  Inject 18 Units Subcutaneous At Bedtime Pt to take 14 units if pt is NPO for surgery  Doctor's comments:  If Basaglar is not covered by insurance, may substitute Lantus at same dose and frequency.                       ketorolac tromethamine 0.4 % Soln ophthalmic solution  Also known as:  ACULAR-LS  INSTRUCTIONS:  Apply 1 drop to eye 4 times daily Instill into operative eye(s) per physician instructions.                     moxifloxacin 0.5 % ophthalmic solution  Also known as:  VIGAMOX  INSTRUCTIONS:  Apply 1 drop to eye 4 times daily Instill into operative eye(s) per physician instructions                     NovoLOG FLEXPEN 100 UNIT/ML pen  INSTRUCTIONS:  INJECT 4 UNITS SUBCUTANEOUSLY WITH BREAKFAST, LUNCH AND DINNER.  Generic drug:  insulin aspart                     prednisoLONE acetate 1 % ophthalmic suspension  Also known as:  PRED FORTE  INSTRUCTIONS:  Apply 1 drop to eye 4 times daily Instill into operative eye(s) per physician instructions.                     Renvela 800 MG tablet  INSTRUCTIONS:  Take 1 tablet by mouth 3 times daily  Generic drug:  sevelamer carbonate                     sertraline 50 MG tablet  Also known as:  ZOLOFT  INSTRUCTIONS:  Take 1 tablet (50 mg) by mouth daily                     sevelamer HCl 800 MG tablet  Also known as:  RENAGEL  INSTRUCTIONS:  Take 2 tablets (1,600 mg) by mouth 3  times daily (with meals). May also take 1 tablet (800 mg) 2 times daily as needed (with snacks).                     vitamin D3 50 mcg (2000 units) tablet  Also known as:  CHOLECALCIFEROL  INSTRUCTIONS:  Take 1 tablet (2,000 Units) by mouth daily

## 2020-09-25 NOTE — PROGRESS NOTES
Pt arrived back from fistulogram. VSS. LUE tip stop x2 CDI ,no hematoma. bruit +, pulse +, thrill -. Notified provider, who came to bedside to access site. Site looks good, will continue to monitor.

## 2020-09-25 NOTE — IP AVS SNAPSHOT
[address] [address]                                    After Visit Summary   9/25/2020    Supriya Herr    MRN: [id number]           After Visit Summary Signature Page    I have received my discharge instructions, and my questions have been answered. I have discussed any challenges I see with this plan with the nurse or doctor.    ..........................................................................................................................................  Patient/Patient Representative Signature      ..........................................................................................................................................  Patient Representative Print Name and Relationship to Patient    ..................................................               ................................................  Date                                   Time    ..........................................................................................................................................  Reviewed by Signature/Title    ...................................................              ..............................................  Date                                               Time          22EPIC Rev 08/18

## 2020-09-25 NOTE — PROGRESS NOTES
Pt arrived to 2A for Left fistulogram declot and possible tunneled dialysis catheter placement. Pt is a left leg amputee. VSS, exception 99.3F. Pt is diabetic, BG 86. Patient's right leg is red, warm to touch and painful; MD notified, will plan to obtain US post procedure. Labs WNL. H&P uptodate and consent signed.

## 2020-09-25 NOTE — DISCHARGE INSTRUCTIONS
[facility]      Interventional Radiology  Discharge Instructions Following Fistulogram      ? You may resume normal activities as tolerated, but avoid any strenuous activity or heavy lifting (>10 lbs.) involving your access arm.    ? Elevate and rest your arm as much as possible for the first 24 hours after the procedure.  This will promote healing and reduce swelling.     ? If bleeding or oozing should occur, apply fingertip pressure to puncture site to control bleeding, but avoid excessive pressure as this may clot off the fistula.  Hold light pressure for 10 minutes, or until bleeding/oozing is controlled.  If excessive bleeding is noted or if you are having difficulty controlling the bleeding with direct pressure, call 911.     ? If you develop fever, chills, excessive pain/tenderness or drainage at the puncture site, or have questions call your Doctor or Dialysis Center.     ? If you received sedation for your procedure: An adult should stay with you for 24 hours, Do Not drive a motor vehicle, operate machinery, or drink alcoholic beverages for 24 hours.     ? You may resume your normal medications immediately    ? If you notice sudden loss of pulse or thrill (buzzing feeling) in your fistula, contact your Doctor or Dialysis unit immediately.         [facility] INTERVENTIONAL RADIOLOGY DEPARTMENT  Procedure Physician: Dr. Jenkins and Dr. Domingo                                                       Date of procedure: September 25, 2020  Telephone Numbers: [phone] Monday-Friday 8:00 am to 4:30 pm  [phone] After 4:30 pm Monday-Friday, Weekends & Holidays.   Ask for the Interventional Radiologist on call.  Someone is on call 24 hrs/day  [facility] toll free number: [phone] Monday-Friday 8:00 am to 4:30 pm  [facility] Emergency Dept: [phone]

## 2020-09-25 NOTE — PROGRESS NOTES
Patient Name: Supriya Herr  Medical Record Number: [id number]  Today's Date: 9/25/2020    Procedure: fistulogram left upper extermity/ stent placement  Proceduralist: Dr.Shrestha Mcneal    Procedure Start: 0910  Procedure end: 1110  Sedation medications administered: 4mg versed 200mcg fentanyl     Report given to: 2A RN     Pt arrived to IR room 5 from . Consent reviewed. Pt denies any questions or concerns regarding procedure.Pt c/o RLE redness and pain MD aware of possible cellulitis DP pulse on RLE + doppler MD aware. Pt positioned supine and monitored per protocol. Stent placed to ELEANORE, pt rcd antibiotics per verbal orders. MD to order US for RLE. Pt tolerated procedure without any noted complications. Pt transferred back to .

## 2020-09-25 NOTE — TELEPHONE ENCOUNTER
Spoke patients daughter. Patient is in procedure right now. Daughter will follow up with nurse when she picks her up to see if they spoke about her possible cellulitis symptoms. Patient is having pain in leg with warmth to touch and redness. If patient did not get her leg looked at then her daughter will bring her to urgent care or ER to be evaluated as we do not have any openings today.    Hiral Grimaldo RN   [facility]

## 2020-09-25 NOTE — PROCEDURES
[facility]    Procedure: Left arm fistulogram    Date/Time: 9/25/2020 11:27 AM  Performed by: Jayden Jenkins MD  Authorized by: Jayden Jenkins MD     UNIVERSAL PROTOCOL   Site Marked: NA  Prior Images Obtained and Reviewed:  Yes  Required items: Required blood products, implants, devices and special equipment available    Patient identity confirmed:  Verbally with patient, arm band, provided demographic data and hospital-assigned identification number  Patient was reevaluated immediately before administering moderate or deep sedation or anesthesia  Confirmation Checklist:  Patient's identity using two indicators, relevant allergies, procedure was appropriate and matched the consent or emergent situation and correct equipment/implants were available  Time out: Immediately prior to the procedure a time out was called    Universal Protocol: the Joint Commission Universal Protocol was followed    Preparation: Patient was prepped and draped in usual sterile fashion    ESBL (mL):  5         ANESTHESIA    Anesthesia: Local infiltration  Local Anesthetic:  Lidocaine 1% without epinephrine      SEDATION    Patient Sedated: Yes    Sedation:  Fentanyl and midazolam  Vital signs: Vital signs monitored during sedation    See dictated procedure note for full details.  Findings: Atretic vein, likely extravasation with 6mm angioplasty.    Placement of 7mm x 10 cm stent graft, self expanding.    Flow at end was brisk with restoration of thrill and pulse.     Specimens: none    Complications: None    Condition: Stable    PROCEDURE   Patient Tolerance:  Patient tolerated the procedure well with no immediate complications    Length of time physician/provider present for 1:1 monitoring during sedation: 120

## 2020-09-25 NOTE — PRE-PROCEDURE
GENERAL PRE-PROCEDURE:   Date/Time:  9/25/2020 8:09 AM    Written consent obtained?: Yes    Risks and benefits: Risks, benefits and alternatives were discussed    Consent given by:  Patient  Patient states understanding of procedure being performed: Yes    Patient's understanding of procedure matches consent: Yes    Procedure consent matches procedure scheduled: Yes    Expected level of sedation:  Moderate  Appropriately NPO:  Yes  ASA Class:  Class 3- Severe systemic disease, definite functional limitations  Mallampati  :  Grade 2- soft palate, base of uvula, tonsillar pillars, and portion of posterior pharyngeal wall visible  Lungs:  Lungs clear with good breath sounds bilaterally  Heart:  Normal heart sounds and rate  History & Physical reviewed:  History and physical reviewed and no updates needed  Statement of review:  I have reviewed the lab findings, diagnostic data, medications, and the plan for sedation

## 2020-09-25 NOTE — PROVIDER NOTIFICATION
Pt's distal LUE procedure site oozing. Light finger tip pressure held for 5 minutes, oozing stopped. LUE elevated on pillow. Strong pulse remains over fistula.   Edward Miguel notified and aware. Will continue to monitor.

## 2020-09-25 NOTE — TELEPHONE ENCOUNTER
Per daughter  Has a high long antonio in her leg, warm to the touch, it was ruled out for a clot at her IR appointment today    Advised she take her mom to the ED for eval for possible cellulitis    Viridiana Sprague RN   [facility]

## 2020-09-25 NOTE — PROGRESS NOTES
VSS. Pt tolerated fluids and solids. Voided without issues.  R leg US completed, no DVT.  LUE tip stop x2 slight oozing after activity (bed to wheelchair). Held pressure for 5mins and notified provider. Provider came to bedside, pt ok to discharge. Reviewed discharge information with patient. Pt verbalized understanding and did not have any further questions. Pt wheeled out of the unit at 1522.

## 2020-09-28 ENCOUNTER — OFFICE VISIT (OUTPATIENT)
Dept: FAMILY MEDICINE | Facility: CLINIC | Age: 70
End: 2020-09-28
Payer: MEDICARE

## 2020-09-28 VITALS
SYSTOLIC BLOOD PRESSURE: 120 MMHG | HEIGHT: 66 IN | OXYGEN SATURATION: 98 % | TEMPERATURE: 98 F | HEART RATE: 62 BPM | BODY MASS INDEX: 32.28 KG/M2 | DIASTOLIC BLOOD PRESSURE: 60 MMHG

## 2020-09-28 DIAGNOSIS — L03.115 CELLULITIS OF RIGHT LEG: Primary | ICD-10-CM

## 2020-09-28 DIAGNOSIS — I73.9 PVD (PERIPHERAL VASCULAR DISEASE) (H): ICD-10-CM

## 2020-09-28 PROCEDURE — 99214 OFFICE O/P EST MOD 30 MIN: CPT | Performed by: FAMILY MEDICINE

## 2020-09-28 RX ORDER — CLINDAMYCIN HCL 300 MG
300 CAPSULE ORAL
COMMUNITY
Start: 2020-09-25 | End: 2020-09-28

## 2020-09-28 RX ORDER — CLINDAMYCIN HCL 300 MG
300 CAPSULE ORAL 4 TIMES DAILY
Qty: 28 CAPSULE | Refills: 0 | Status: SHIPPED | OUTPATIENT
Start: 2020-09-28 | End: 2020-10-05

## 2020-09-28 ASSESSMENT — PAIN SCALES - GENERAL: PAINLEVEL: MODERATE PAIN (4)

## 2020-09-28 NOTE — PROGRESS NOTES
Subjective     Supriya Herr is a 70 year old female who presents to clinic today for the following health issues:    HPI       Cellulitis   Onset/Duration: 1 week   Description  Location: Right leg   Character: red  Itching: Some has and some of it hasn't   Intensity:  mild  Progression of Symptoms:  improving  Accompanying signs and symptoms:   Fever: Last Friday temp 102   Body aches or joint pain: no  Sore throat symptoms: no  Recent cold symptoms: When patient eats her nose runs  History:           Previous episodes of similar rash: Yes   New exposures:  None  Recent travel: no  Exposure to similar rash: no  Precipitating or alleviating factors: Clindamycin Outcome effective    Therapies tried and outcome:  Clindamycin Outcome effective    Encounter Diagnoses   Name Primary?     Cellulitis of right leg Yes     PVD (peripheral vascular disease) (H)                Review of Systems   Constitutional, HEENT, cardiovascular, pulmonary, gi and gu systems are negative, except as otherwise noted.      Objective    There were no vitals taken for this visit.  There is no height or weight on file to calculate BMI.  Physical Exam   GENERAL: alert, frail, elderly and fatigued  RESP: lungs clear to auscultation - no rales, rhonchi or wheezes  CV: regular rates and rhythm  SKIN: right lower leg shows mild swelling/ erythema with a scabbed over 1.2 cm shin wound            Assessment & Plan     Cellulitis of right leg  Continue cares. extend abx to 17 days  Use warm soak/ gentle compression  Follow up by phone in 2 weeks if not improving.   - clindamycin (CLEOCIN) 300 MG capsule; Take 1 capsule (300 mg) by mouth 4 times daily for 7 days    PVD (peripheral vascular disease) (H)  As above  Follow up with consultant as planned.                 See Patient Instructions    No follow-ups on file.    Noam Jackson MD  [facility] PRIMARY [facility]

## 2020-09-29 DIAGNOSIS — T82.898A OTHER SPECIFIED COMPLICATION OF VASCULAR PROSTHETIC DEVICES, IMPLANTS AND GRAFTS, INITIAL ENCOUNTER (H): ICD-10-CM

## 2020-09-29 DIAGNOSIS — T82.9XXA COMPLICATION OF VASCULAR ACCESS FOR DIALYSIS, INITIAL ENCOUNTER: ICD-10-CM

## 2020-09-29 DIAGNOSIS — Z99.2 ESRD ON DIALYSIS (H): Primary | ICD-10-CM

## 2020-09-29 DIAGNOSIS — N18.6 ESRD ON DIALYSIS (H): Primary | ICD-10-CM

## 2020-09-30 ENCOUNTER — ANCILLARY PROCEDURE (OUTPATIENT)
Dept: ULTRASOUND IMAGING | Facility: CLINIC | Age: 70
End: 2020-09-30
Attending: CLINICAL NURSE SPECIALIST
Payer: MEDICARE

## 2020-09-30 ENCOUNTER — TELEPHONE (OUTPATIENT)
Dept: INTERVENTIONAL RADIOLOGY/VASCULAR | Facility: CLINIC | Age: 70
End: 2020-09-30

## 2020-09-30 DIAGNOSIS — N18.6 ESRD ON DIALYSIS (H): Primary | ICD-10-CM

## 2020-09-30 DIAGNOSIS — T82.9XXA COMPLICATION OF VASCULAR ACCESS FOR DIALYSIS, INITIAL ENCOUNTER: ICD-10-CM

## 2020-09-30 DIAGNOSIS — Z99.2 ESRD ON DIALYSIS (H): ICD-10-CM

## 2020-09-30 DIAGNOSIS — T82.898A OTHER SPECIFIED COMPLICATION OF VASCULAR PROSTHETIC DEVICES, IMPLANTS AND GRAFTS, INITIAL ENCOUNTER (H): ICD-10-CM

## 2020-09-30 DIAGNOSIS — N18.6 ESRD ON DIALYSIS (H): ICD-10-CM

## 2020-09-30 DIAGNOSIS — Z99.2 ESRD ON DIALYSIS (H): Primary | ICD-10-CM

## 2020-09-30 LAB — RADIOLOGIST FLAGS: ABNORMAL

## 2020-10-01 ENCOUNTER — APPOINTMENT (OUTPATIENT)
Dept: INTERVENTIONAL RADIOLOGY/VASCULAR | Facility: CLINIC | Age: 70
End: 2020-10-01
Attending: CLINICAL NURSE SPECIALIST
Payer: MEDICARE

## 2020-10-01 ENCOUNTER — APPOINTMENT (OUTPATIENT)
Dept: MEDSURG UNIT | Facility: CLINIC | Age: 70
End: 2020-10-01
Attending: RADIOLOGY
Payer: MEDICARE

## 2020-10-01 ENCOUNTER — HOSPITAL ENCOUNTER (OUTPATIENT)
Facility: CLINIC | Age: 70
Discharge: HOME | End: 2020-10-01
Attending: RADIOLOGY | Admitting: RADIOLOGY
Payer: MEDICARE

## 2020-10-01 VITALS
RESPIRATION RATE: 16 BRPM | HEART RATE: 62 BPM | OXYGEN SATURATION: 96 % | DIASTOLIC BLOOD PRESSURE: 53 MMHG | SYSTOLIC BLOOD PRESSURE: 143 MMHG

## 2020-10-01 DIAGNOSIS — Z99.2 ESRD ON DIALYSIS (H): ICD-10-CM

## 2020-10-01 DIAGNOSIS — N18.6 ESRD ON DIALYSIS (H): ICD-10-CM

## 2020-10-01 DIAGNOSIS — T82.9XXA COMPLICATION OF VASCULAR ACCESS FOR DIALYSIS, INITIAL ENCOUNTER: ICD-10-CM

## 2020-10-01 LAB — GLUCOSE BLDC GLUCOMTR-MCNC: 88 MG/DL (ref 70–99)

## 2020-10-01 PROCEDURE — 76937 US GUIDE VASCULAR ACCESS: CPT

## 2020-10-01 PROCEDURE — 255N000002 HC RX 255 OP 636: Performed by: RADIOLOGY

## 2020-10-01 PROCEDURE — 999N000132 HC STATISTIC PP CARE STAGE 1

## 2020-10-01 PROCEDURE — C1887 CATHETER, GUIDING: HCPCS

## 2020-10-01 PROCEDURE — 250N000009 HC RX 250: Performed by: RADIOLOGY

## 2020-10-01 PROCEDURE — 99153 MOD SED SAME PHYS/QHP EA: CPT

## 2020-10-01 PROCEDURE — 36902 INTRO CATH DIALYSIS CIRCUIT: CPT

## 2020-10-01 PROCEDURE — 250N000011 HC RX IP 250 OP 636: Performed by: PHYSICIAN ASSISTANT

## 2020-10-01 PROCEDURE — 76937 US GUIDE VASCULAR ACCESS: CPT | Mod: 26 | Performed by: RADIOLOGY

## 2020-10-01 PROCEDURE — 999N001017 HC STATISTIC GLUCOSE BY METER IP

## 2020-10-01 PROCEDURE — 272N000211 HC BALLOON (NON-PTA) CR8

## 2020-10-01 PROCEDURE — 272N000504 HC NEEDLE CR4

## 2020-10-01 PROCEDURE — 250N000011 HC RX IP 250 OP 636: Performed by: RADIOLOGY

## 2020-10-01 PROCEDURE — 272N000302 HC DEVICE INFLATION CR5

## 2020-10-01 PROCEDURE — C1769 GUIDE WIRE: HCPCS

## 2020-10-01 PROCEDURE — C1757 CATH, THROMBECTOMY/EMBOLECT: HCPCS

## 2020-10-01 PROCEDURE — 272N000564 HC SHEATH CR2

## 2020-10-01 PROCEDURE — 99152 MOD SED SAME PHYS/QHP 5/>YRS: CPT

## 2020-10-01 PROCEDURE — C1725 CATH, TRANSLUMIN NON-LASER: HCPCS

## 2020-10-01 PROCEDURE — 272N000147 HC KIT CR7

## 2020-10-01 PROCEDURE — 36902 INTRO CATH DIALYSIS CIRCUIT: CPT | Performed by: RADIOLOGY

## 2020-10-01 PROCEDURE — 99152 MOD SED SAME PHYS/QHP 5/>YRS: CPT | Performed by: RADIOLOGY

## 2020-10-01 PROCEDURE — 272N000199 HC ACCESSORY CR8

## 2020-10-01 RX ORDER — IODIXANOL 320 MG/ML
150 INJECTION, SOLUTION INTRAVASCULAR ONCE
Status: COMPLETED | OUTPATIENT
Start: 2020-10-01 | End: 2020-10-01

## 2020-10-01 RX ORDER — NALOXONE HYDROCHLORIDE 0.4 MG/ML
.1-.4 INJECTION, SOLUTION INTRAMUSCULAR; INTRAVENOUS; SUBCUTANEOUS
Status: DISCONTINUED | OUTPATIENT
Start: 2020-10-01 | End: 2020-10-01 | Stop reason: HOSPADM

## 2020-10-01 RX ORDER — HEPARIN SODIUM 200 [USP'U]/100ML
1 INJECTION, SOLUTION INTRAVENOUS CONTINUOUS PRN
Status: DISCONTINUED | OUTPATIENT
Start: 2020-10-01 | End: 2020-10-01 | Stop reason: HOSPADM

## 2020-10-01 RX ORDER — NICOTINE POLACRILEX 4 MG
15-30 LOZENGE BUCCAL
Status: DISCONTINUED | OUTPATIENT
Start: 2020-10-01 | End: 2020-10-01 | Stop reason: HOSPADM

## 2020-10-01 RX ORDER — LIDOCAINE 40 MG/G
CREAM TOPICAL
Status: DISCONTINUED | OUTPATIENT
Start: 2020-10-01 | End: 2020-10-01 | Stop reason: HOSPADM

## 2020-10-01 RX ORDER — ACETAMINOPHEN 500 MG
500 TABLET ORAL EVERY 6 HOURS PRN
Status: DISCONTINUED | OUTPATIENT
Start: 2020-10-01 | End: 2020-10-01 | Stop reason: HOSPADM

## 2020-10-01 RX ORDER — LIDOCAINE HYDROCHLORIDE 10 MG/ML
1-30 INJECTION, SOLUTION EPIDURAL; INFILTRATION; INTRACAUDAL; PERINEURAL
Status: DISCONTINUED | OUTPATIENT
Start: 2020-10-01 | End: 2020-10-01 | Stop reason: HOSPADM

## 2020-10-01 RX ORDER — HEPARIN SODIUM 1000 [USP'U]/ML
500-6000 INJECTION, SOLUTION INTRAVENOUS; SUBCUTANEOUS
Status: COMPLETED | OUTPATIENT
Start: 2020-10-01 | End: 2020-10-01

## 2020-10-01 RX ORDER — FENTANYL CITRATE 50 UG/ML
25-50 INJECTION, SOLUTION INTRAMUSCULAR; INTRAVENOUS EVERY 5 MIN PRN
Status: DISCONTINUED | OUTPATIENT
Start: 2020-10-01 | End: 2020-10-01 | Stop reason: HOSPADM

## 2020-10-01 RX ORDER — SODIUM CHLORIDE 9 MG/ML
INJECTION, SOLUTION INTRAVENOUS CONTINUOUS
Status: DISCONTINUED | OUTPATIENT
Start: 2020-10-01 | End: 2020-10-01 | Stop reason: HOSPADM

## 2020-10-01 RX ORDER — DEXTROSE MONOHYDRATE 25 G/50ML
25-50 INJECTION, SOLUTION INTRAVENOUS
Status: DISCONTINUED | OUTPATIENT
Start: 2020-10-01 | End: 2020-10-01 | Stop reason: HOSPADM

## 2020-10-01 RX ORDER — CLINDAMYCIN PHOSPHATE 900 MG/50ML
900 INJECTION, SOLUTION INTRAVENOUS ONCE
Status: COMPLETED | OUTPATIENT
Start: 2020-10-01 | End: 2020-10-01

## 2020-10-01 RX ORDER — FLUMAZENIL 0.1 MG/ML
0.2 INJECTION, SOLUTION INTRAVENOUS
Status: DISCONTINUED | OUTPATIENT
Start: 2020-10-01 | End: 2020-10-01 | Stop reason: HOSPADM

## 2020-10-01 RX ADMIN — LIDOCAINE HYDROCHLORIDE 2 ML: 10 INJECTION, SOLUTION EPIDURAL; INFILTRATION; INTRACAUDAL; PERINEURAL at 14:33

## 2020-10-01 RX ADMIN — FENTANYL CITRATE 50 MCG: 50 INJECTION, SOLUTION INTRAMUSCULAR; INTRAVENOUS at 14:41

## 2020-10-01 RX ADMIN — FENTANYL CITRATE 50 MCG: 50 INJECTION, SOLUTION INTRAMUSCULAR; INTRAVENOUS at 14:24

## 2020-10-01 RX ADMIN — HEPARIN SODIUM 4500 UNITS: 1000 INJECTION INTRAVENOUS; SUBCUTANEOUS at 14:49

## 2020-10-01 RX ADMIN — IODIXANOL 50 ML: 320 INJECTION, SOLUTION INTRAVASCULAR at 15:18

## 2020-10-01 RX ADMIN — CLINDAMYCIN PHOSPHATE 900 MG: 900 INJECTION, SOLUTION INTRAVENOUS at 13:48

## 2020-10-01 RX ADMIN — MIDAZOLAM 1 MG: 1 INJECTION INTRAMUSCULAR; INTRAVENOUS at 14:41

## 2020-10-01 RX ADMIN — HEPARIN SODIUM 2 BAG: 200 INJECTION, SOLUTION INTRAVENOUS at 14:42

## 2020-10-01 RX ADMIN — FENTANYL CITRATE 50 MCG: 50 INJECTION, SOLUTION INTRAMUSCULAR; INTRAVENOUS at 15:03

## 2020-10-01 RX ADMIN — MIDAZOLAM 1 MG: 1 INJECTION INTRAMUSCULAR; INTRAVENOUS at 14:24

## 2020-10-01 RX ADMIN — MIDAZOLAM 1 MG: 1 INJECTION INTRAMUSCULAR; INTRAVENOUS at 15:04

## 2020-10-01 NOTE — PROCEDURES
[facility]     Procedure: IR Procedure Note    Date/Time: 10/1/2020 3:13 PM  Performed by: Ilya Wall MD  Authorized by: Ilya Wall MD     UNIVERSAL PROTOCOL   Site Marked: NA  Prior Images Obtained and Reviewed:  Yes  Required items: Required blood products, implants, devices and special equipment available    Patient identity confirmed:  Verbally with patient, arm band, provided demographic data and hospital-assigned identification number  Patient was reevaluated immediately before administering moderate or deep sedation or anesthesia  Confirmation Checklist:  Patient's identity using two indicators, relevant allergies, procedure was appropriate and matched the consent or emergent situation and correct equipment/implants were available  Time out: Immediately prior to the procedure a time out was called    Universal Protocol: the Joint Commission Universal Protocol was followed    Preparation: Patient was prepped and draped in usual sterile fashion           ANESTHESIA    Anesthesia: Local infiltration  Local Anesthetic:  Lidocaine 1% without epinephrine      SEDATION    Patient Sedated: Yes    Sedation Type:  Moderate (conscious) sedation  Vital signs: Vital signs monitored during sedation    Fluoroscopy Time: 10 minute(s)  See dictated procedure note for full details.  Findings: -Fisutlogram with plasty of arterial anastomosis.     Specimens: none    Complications: None    Condition: Stable    Plan: -Patient to return as needed.    -If the patient returns soon, with issues or clotted access the plan would be for a tunneled CVC placement.    -referral to surgery for new access work up.     PROCEDURE   Patient Tolerance:  Patient tolerated the procedure well with no immediate complications    Length of time physician/provider present for 1:1 monitoring during sedation: 45

## 2020-10-01 NOTE — IP AVS SNAPSHOT
MRN:[id number]                      After Visit Summary   10/1/2020    Supriya Herr    MRN: [id number]           Visit Information        Department      10/1/2020 11:32 AM [facility] Interventional Radiology          Review of your medicines      UNREVIEWED medicines. Ask your doctor about these medicines       Dose / Directions   albuterol 108 (90 Base) MCG/ACT inhaler  Commonly known as: PROAIR HFA/PROVENTIL HFA/VENTOLIN HFA  Used for: Cough      Dose: 2 puff  Inhale 2 puffs into the lungs every 6 hours as needed for shortness of breath / dyspnea or wheezing  Quantity: 3 Inhaler  Refills: 1     amLODIPine 5 MG tablet  Commonly known as: NORVASC  Used for: Hypertension goal BP (blood pressure) < 140/90      Dose: 5 mg  Take 1 tablet (5 mg) by mouth 2 times daily  Quantity: 180 tablet  Refills: 3     apremilast 30 MG tablet  Commonly known as: OTEZLA  Used for: Inverse psoriasis      Dose: 30 mg  Take 1 tablet (30 mg) by mouth daily  Quantity: 90 tablet  Refills: 3     atorvastatin 20 MG tablet  Commonly known as: LIPITOR      Dose: 20 mg  Take 20 mg by mouth daily  Refills: 0     betamethasone dipropionate 0.05 % external ointment  Commonly known as: DIPROSONE  Used for: Inverse psoriasis      Use twice daily on the weekend to inflamed areas  Quantity: 50 g  Refills: 5     carvedilol 25 MG tablet  Commonly known as: COREG  Used for: Essential hypertension      Dose: 25 mg  Take 1 tablet (25 mg) by mouth 2 times daily (with meals)  Quantity: 180 tablet  Refills: 3     clindamycin 300 MG capsule  Commonly known as: CLEOCIN  Used for: Cellulitis of right leg      Dose: 300 mg  Take 1 capsule (300 mg) by mouth 4 times daily for 7 days  Quantity: 28 capsule  Refills: 0     desonide 0.05 % external ointment  Commonly known as: DESOWEN      Apply topically as needed  Refills: 0     furosemide 80 MG tablet  Commonly known as: LASIX  Used for: CKD (chronic kidney disease) stage 5, GFR less  than 15 ml/min (H), Anemia due to stage 5 chronic kidney disease, 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 (H)      Dose: 80 mg  Take 1 tablet (80 mg) by mouth 2 times daily  Quantity: 180 tablet  Refills: 3     insulin glargine 100 UNIT/ML pen  Used for: Type 2 diabetes mellitus with diabetic neuropathy, with long-term current use of insulin (H)      Dose: 18 Units  Inject 18 Units Subcutaneous At Bedtime Pt to take 14 units if pt is NPO for surgery  Quantity: 2 mL  Refills: 3     ketorolac tromethamine 0.4 % Soln ophthalmic solution  Commonly known as: ACULAR-LS  Used for: Combined forms of age-related cataract of right eye, Aftercare following surgery of a sense organ      Dose: 1 drop  Apply 1 drop to eye 4 times daily Instill into operative eye(s) per physician instructions.  Quantity: 5 mL  Refills: 0     moxifloxacin 0.5 % ophthalmic solution  Commonly known as: VIGAMOX  Used for: Combined forms of age-related cataract of right eye, Aftercare following surgery of a sense organ      Dose: 1 drop  Apply 1 drop to eye 4 times daily Instill into operative eye(s) per physician instructions  Quantity: 3 mL  Refills: 0     NovoLOG FLEXPEN 100 UNIT/ML pen  Used for: Type 2 diabetes mellitus with hyperglycemia, with long-term current use of insulin (H)  Generic drug: insulin aspart      INJECT 4 UNITS SUBCUTANEOUSLY WITH BREAKFAST, LUNCH AND DINNER.  Quantity: 15 mL  Refills: 3     prednisoLONE acetate 1 % ophthalmic suspension  Commonly known as: PRED FORTE  Used for: Combined forms of age-related cataract of right eye, Aftercare following surgery of a sense organ      Dose: 1 drop  Apply 1 drop to eye 4 times daily Instill into operative eye(s) per physician instructions.  Quantity: 5 mL  Refills: 0     Renvela 800 MG tablet  Generic drug: sevelamer carbonate      Dose: 1 tablet  Take 1 tablet by mouth 3 times daily  Refills: 0     sertraline 50 MG tablet  Commonly known as: ZOLOFT  Used for: NICOLE (generalized anxiety disorder)       Dose: 50 mg  Take 1 tablet (50 mg) by mouth daily  Quantity: 90 tablet  Refills: 3     sevelamer HCl 800 MG tablet  Commonly known as: RENAGEL  Used for: Hyperphosphatemia      Take 2 tablets (1,600 mg) by mouth 3 times daily (with meals). May also take 1 tablet (800 mg) 2 times daily as needed (with snacks).  Quantity: 720 tablet  Refills: 3     vitamin D3 50 mcg (2000 units) tablet  Commonly known as: CHOLECALCIFEROL  Used for: Vitamin D deficiency      Dose: 2,000 Units  Take 1 tablet (2,000 Units) by mouth daily  Quantity: 100 tablet  Refills: 3        CONTINUE these medicines which have NOT CHANGED       Dose / Directions   blood glucose lancets standard  Commonly known as: NO BRAND SPECIFIED  Used for: Type 2 diabetes mellitus with hyperglycemia, with long-term current use of insulin (H)      Use to test blood sugar 3 to 4  times daily or as directed.  Quantity: 400 each  Refills: 3     blood glucose monitoring meter device kit  Commonly known as: NO BRAND SPECIFIED  Used for: Type 2 diabetes mellitus with hyperglycemia, with long-term current use of insulin (H)      Use to test blood sugar 3 to 4 times daily or as directed.  Quantity: 1 kit  Refills: 0     * blood glucose test strip  Commonly known as: ONETOUCH ULTRA  Used for: Type 2 diabetes mellitus with diabetic nephropathy, with long-term current use of insulin (H)      TEST YOUR BLOOD SUGAR 3-4 TIMES PER DAY.  Quantity: 400 strip  Refills: 1     * blood glucose test strip  Commonly known as: NO BRAND SPECIFIED  Used for: Type 2 diabetes mellitus with hyperglycemia, with long-term current use of insulin (H)      Use to test blood sugar 4 times daily or as directed.  Quantity: 400 strip  Refills: 2     FreeStyle Avery 14 Day Sensor Misc  Used for: Type 2 diabetes mellitus with diabetic neuropathy, with long-term current use of insulin (H)      Dose: 1 each  1 each every 14 days  Quantity: 6 each  Refills: 3     FreeStyle Avery [place] Emma  Used for: Type  "2 diabetes mellitus with diabetic neuropathy, with long-term current use of insulin (H)      Dose: 1 each  1 each See Admin Instructions Use per 's instructions to monitor glucose continuously.  Quantity: 1 Device  Refills: 0     insulin pen needle 31G X 6 MM miscellaneous  Commonly known as: ULTICARE MINI  Used for: Type 2 diabetes, HbA1c goal < 7% (H)      Use daily or as directed.  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 192#  Height 5'5\"  99 months/Lifetime  Quantity: 1 Units  Refills: 0         * This list has 2 medication(s) that are the same as other medications prescribed for you. Read the directions carefully, and ask your doctor or other care provider to review them with you.                  Protect others around you: Learn how to safely use, store and throw away your medicines at www.disposemymeds.org.       Follow-ups after your visit       Care Instructions       Further instructions from your care team         [facility]      Interventional Radiology  Discharge Instructions Following Fistulogram      ? You may resume normal activities as tolerated, but avoid any strenuous activity or heavy " lifting (>10 lbs.) involving your access arm.    ? Elevate and rest your arm as much as possible for the first 24 hours after the procedure.  This will promote healing and reduce swelling.     ? If bleeding or oozing should occur, apply fingertip pressure to puncture site to control bleeding, but avoid excessive pressure as this may clot off the fistula.  Hold light pressure for 10 minutes, or until bleeding/oozing is controlled.  If excessive bleeding is noted or if you are having difficulty controlling the bleeding with direct pressure, call 911.     ? If you develop fever, chills, excessive pain/tenderness or drainage at the puncture site, or have questions call your Doctor or Dialysis Center.     ? If you received sedation for your procedure: An adult should stay with you for 24 hours, Do Not drive a motor vehicle, operate machinery, or drink alcoholic beverages for 24 hours.     ? You may resume your normal medications immediately    ? If you notice sudden loss of pulse or thrill (buzzing feeling) in your fistula, contact your Doctor or Dialysis unit immediately.     Additional Instructions:    Plan: -Patient to return as needed.    -If the patient returns soon, with issues or clotted access the plan would be for a tunneled CVC placement.    -referral to surgery for new access work up.       [facility] INTERVENTIONAL RADIOLOGY DEPARTMENT  Procedure Physician:  Dr. Wall                                                         Date of procedure: October 1, 2020  Telephone Numbers: [phone] Monday-Friday 8:00 am to 4:30 pm  [phone] After 4:30 pm Monday-Friday, Weekends & Holidays.   Ask for the Interventional Radiologist on call.  Someone is on call 24 hrs/day  [facility] toll free number: [phone] Monday-Friday 8:00 am to 4:30 pm  [facility] Emergency Dept: [phone]            Additional Information About Your Visit       [facility] Information    [id number] gives you secure access to your electronic health record. If  you see a primary care provider, you can also send messages to your care team and make appointments. If you have questions, please call your primary care clinic.  If you do not have a primary care provider, please call [phone] and they will assist you.       Care EveryWhere ID    This is your Care EveryWhere ID. This could be used by other organizations to access your [facility] medical records  [id number]       Your Vitals Were  Most recent update: 10/1/2020  4:07 PM    Blood Pressure   151/50   (BP Location: Right arm)    Pulse   65    Respirations   16    Pulse Oximetry   99%           Primary Care Provider Office Phone # Fax #    Noam Luis Jackson [phone]      Equal Access to Services    [facility] : Hadii danisha jacobson hadasho Soomaali, waaxda luqadaha, qaybta kaalmada adeegyada, madeline sood . So [facility] [phone].    ATENCIÓN: Si habla español, tiene a santoro disposición servicios gratuitos de asistencia lingüística. [facility] [phone].    We comply with applicable federal and state civil rights laws, including the [place] Human Rights Act. We do not discriminate on the basis of race, color, creed, Alevism, national origin, marital status, age, disability, sex, sexual orientation, or gender identity.       Thank you!    Thank you for choosing [facility] for your care. Our goal is always to provide you with excellent care. Hearing back from our patients is one way we can continue to improve our services. Please take a few minutes to complete the written survey that you may receive in the mail after you visit with us. Thank you!            Medication List      Medications          Morning Afternoon Evening Bedtime As Needed    blood glucose lancets standard  Also known as: NO BRAND SPECIFIED  INSTRUCTIONS: Use to test blood sugar 3 to 4  times daily or as directed.                     blood glucose monitoring meter device kit  Also known as: NO BRAND  "SPECIFIED  INSTRUCTIONS: Use to test blood sugar 3 to 4 times daily or as directed.                     * blood glucose test strip  Also known as: ONETOUCH ULTRA  INSTRUCTIONS: TEST YOUR BLOOD SUGAR 3-4 TIMES PER DAY.                     * blood glucose test strip  Also known as: NO BRAND SPECIFIED  INSTRUCTIONS: Use to test blood sugar 4 times daily or as directed.                     FreeStyle Avery 14 Day Sensor Misc  INSTRUCTIONS: 1 each every 14 days                     FreeStyle Avery [place] Emma  INSTRUCTIONS: 1 each See Admin Instructions Use per 's instructions to monitor glucose continuously.                     insulin pen needle 31G X 6 MM miscellaneous  Also known as: ULTICARE MINI  INSTRUCTIONS: Use daily or as directed.                     order for DME  INSTRUCTIONS: 1 wheelchair                     order for DME  INSTRUCTIONS: 1 SAD light                     order for DME  INSTRUCTIONS: Equipment being ordered: mattress overlay for hospital bed  Wt. 192#  Height 5'5\"  99 months/Lifetime                        * This list has 2 medication(s) that are the same as other medications prescribed for you. Read the directions carefully, and ask your doctor or other care provider to review them with you.            ASK your doctor about these medications          Morning Afternoon Evening Bedtime As Needed    albuterol 108 (90 Base) MCG/ACT inhaler  Also known as: PROAIR HFA/PROVENTIL HFA/VENTOLIN HFA  INSTRUCTIONS: Inhale 2 puffs into the lungs every 6 hours as needed for shortness of breath / dyspnea or wheezing  Doctor's comments: Pharmacy may dispense brand covered by insurance (Proair, or proventil or ventolin or generic albuterol inhaler)                     amLODIPine 5 MG tablet  Also known as: NORVASC  INSTRUCTIONS: Take 1 tablet (5 mg) by mouth 2 times daily                     apremilast 30 MG tablet  Also known as: OTEZLA  INSTRUCTIONS: Take 1 tablet (30 mg) by mouth daily            "          atorvastatin 20 MG tablet  Also known as: LIPITOR  INSTRUCTIONS: Take 20 mg by mouth daily                     betamethasone dipropionate 0.05 % external ointment  Also known as: DIPROSONE  INSTRUCTIONS: Use twice daily on the weekend to inflamed areas                     carvedilol 25 MG tablet  Also known as: COREG  INSTRUCTIONS: Take 1 tablet (25 mg) by mouth 2 times daily (with meals)                     clindamycin 300 MG capsule  Also known as: CLEOCIN  INSTRUCTIONS: Take 1 capsule (300 mg) by mouth 4 times daily for 7 days  LAST TAKEN: Ask your nurse or doctor                     desonide 0.05 % external ointment  Also known as: DESOWEN  INSTRUCTIONS: Apply topically as needed                     furosemide 80 MG tablet  Also known as: LASIX  INSTRUCTIONS: Take 1 tablet (80 mg) by mouth 2 times daily                     insulin glargine 100 UNIT/ML pen  INSTRUCTIONS: Inject 18 Units Subcutaneous At Bedtime Pt to take 14 units if pt is NPO for surgery  Doctor's comments: If Basaglar is not covered by insurance, may substitute Lantus at same dose and frequency.                       ketorolac tromethamine 0.4 % Soln ophthalmic solution  Also known as: ACULAR-LS  INSTRUCTIONS: Apply 1 drop to eye 4 times daily Instill into operative eye(s) per physician instructions.                     moxifloxacin 0.5 % ophthalmic solution  Also known as: VIGAMOX  INSTRUCTIONS: Apply 1 drop to eye 4 times daily Instill into operative eye(s) per physician instructions                     NovoLOG FLEXPEN 100 UNIT/ML pen  INSTRUCTIONS: INJECT 4 UNITS SUBCUTANEOUSLY WITH BREAKFAST, LUNCH AND DINNER.  Generic drug: insulin aspart                     prednisoLONE acetate 1 % ophthalmic suspension  Also known as: PRED FORTE  INSTRUCTIONS: Apply 1 drop to eye 4 times daily Instill into operative eye(s) per physician instructions.                     Renvela 800 MG tablet  INSTRUCTIONS: Take 1 tablet by mouth 3 times  daily  Generic drug: sevelamer carbonate                     sertraline 50 MG tablet  Also known as: ZOLOFT  INSTRUCTIONS: Take 1 tablet (50 mg) by mouth daily                     sevelamer HCl 800 MG tablet  Also known as: RENAGEL  INSTRUCTIONS: Take 2 tablets (1,600 mg) by mouth 3 times daily (with meals). May also take 1 tablet (800 mg) 2 times daily as needed (with snacks).                     vitamin D3 50 mcg (2000 units) tablet  Also known as: CHOLECALCIFEROL  INSTRUCTIONS: Take 1 tablet (2,000 Units) by mouth daily

## 2020-10-01 NOTE — PRE-PROCEDURE
GENERAL PRE-PROCEDURE:   Procedure:  Fistulogram    Written consent obtained?: Yes    Risks and benefits: Risks, benefits and alternatives were discussed    Consent given by:  Patient  Patient states understanding of procedure being performed: Yes    Patient's understanding of procedure matches consent: Yes    Procedure consent matches procedure scheduled: Yes    Expected level of sedation:  Moderate  Appropriately NPO:  Yes  ASA Class:  Class 3- Severe systemic disease, definite functional limitations  Mallampati  :  Grade 2- soft palate, base of uvula, tonsillar pillars, and portion of posterior pharyngeal wall visible  Lungs:  Lungs clear with good breath sounds bilaterally  Heart:  Normal heart sounds and rate  History & Physical reviewed:  History and physical reviewed and no updates needed  Statement of review:  I have reviewed the lab findings, diagnostic data, medications, and the plan for sedation

## 2020-10-01 NOTE — DISCHARGE INSTRUCTIONS
[facility]      Interventional Radiology  Discharge Instructions Following Fistulogram      ? You may resume normal activities as tolerated, but avoid any strenuous activity or heavy lifting (>10 lbs.) involving your access arm.    ? Elevate and rest your arm as much as possible for the first 24 hours after the procedure.  This will promote healing and reduce swelling.     ? If bleeding or oozing should occur, apply fingertip pressure to puncture site to control bleeding, but avoid excessive pressure as this may clot off the fistula.  Hold light pressure for 10 minutes, or until bleeding/oozing is controlled.  If excessive bleeding is noted or if you are having difficulty controlling the bleeding with direct pressure, call 911.     ? If you develop fever, chills, excessive pain/tenderness or drainage at the puncture site, or have questions call your Doctor or Dialysis Center.     ? If you received sedation for your procedure: An adult should stay with you for 24 hours, Do Not drive a motor vehicle, operate machinery, or drink alcoholic beverages for 24 hours.     ? You may resume your normal medications immediately    ? If you notice sudden loss of pulse or thrill (buzzing feeling) in your fistula, contact your Doctor or Dialysis unit immediately.     Additional Instructions:    Plan: -Patient to return as needed.    -If the patient returns soon, with issues or clotted access the plan would be for a tunneled CVC placement.    -referral to surgery for new access work up.       [facility] INTERVENTIONAL RADIOLOGY DEPARTMENT  Procedure Physician:  Dr. Wall                                                         Date of procedure: October 1, 2020  Telephone Numbers: [phone] Monday-Friday 8:00 am to 4:30 pm  [phone] After 4:30 pm Monday-Friday, Weekends & Holidays.   Ask for the Interventional Radiologist on call.  Someone is on call 24 hrs/day  [facility] toll free  number: [phone] Monday-Friday 8:00 am to 4:30 pm  [facility] Emergency Dept: [phone]

## 2020-10-01 NOTE — IP AVS SNAPSHOT
[facility] Interventional Radiology  [address] [facility] [address]  Phone: [phone]                                    After Visit Summary   10/1/2020    Supriya Herr    MRN: [id number]           After Visit Summary Signature Page    I have received my discharge instructions, and my questions have been answered. I have discussed any challenges I see with this plan with the nurse or doctor.    ..........................................................................................................................................  Patient/Patient Representative Signature      ..........................................................................................................................................  Patient Representative Print Name and Relationship to Patient    ..................................................               ................................................  Date                                   Time    ..........................................................................................................................................  Reviewed by Signature/Title    ...................................................              ..............................................  Date                                               Time          22EPIC Rev 08/18

## 2020-10-01 NOTE — PROGRESS NOTES
Patient arrived on 2A post LUE fistulogram with plasty.  VSS.  Denies pain.  LUE site C/D/I, +thrill.  PO food and fluids at bedside.  1 hr bedrest, patient aware.

## 2020-10-01 NOTE — IR NOTE
Patient Name: Supriya Herr  Medical Record Number: [id number]  Today's Date: 10/1/2020    Procedure: fistulogram with intervention  Proceduralist: Dr CLYDE Wall    Sedation start time: 1424  Sedation end time: 1509  Sedation medications administered: 3 mg versed, 150 mcg fentanyl  Total sedation time: 45 min  Sedation Notes: none    Additional medications: 4500 unit(s) heparin IV    Procedure start time: 1429  Puncture time: 1432  Procedure end time: 1509  Out of room time: 1525    Report given to: Violeta WOMACK   : none    Other Notes: Pt arrived to IR room 5 from . Consent reviewed, pt confirmed. Pt denies any questions or concerns regarding procedure. Pt positioned supine and monitored per protocol. Plasty x2. Site cleansed and dressed per protocol. Pt tolerated procedure without any noted complications. Pt transferred back to .

## 2020-10-01 NOTE — PROGRESS NOTES
Patient tolerated recovery stage well. VSS, LUE site clean/dry/intact, no hematoma, and denies pain. Patient tolerated PO food and fluids. Teaching was done and discharge instructions were given.  PIV was removed. Patient discharged from the hospital via wheel chair to home with daughter @ 1650.

## 2020-10-05 ENCOUNTER — OFFICE VISIT (OUTPATIENT)
Dept: TRANSPLANT | Facility: CLINIC | Age: 70
End: 2020-10-05
Attending: SURGERY
Payer: MEDICARE

## 2020-10-05 VITALS
HEART RATE: 63 BPM | TEMPERATURE: 98.3 F | OXYGEN SATURATION: 97 % | SYSTOLIC BLOOD PRESSURE: 160 MMHG | WEIGHT: 194 LBS | BODY MASS INDEX: 31.31 KG/M2 | DIASTOLIC BLOOD PRESSURE: 70 MMHG

## 2020-10-05 DIAGNOSIS — Z99.2 ESRD ON DIALYSIS (H): Primary | ICD-10-CM

## 2020-10-05 DIAGNOSIS — N18.6 ESRD ON DIALYSIS (H): Primary | ICD-10-CM

## 2020-10-05 PROCEDURE — G0463 HOSPITAL OUTPT CLINIC VISIT: HCPCS

## 2020-10-05 PROCEDURE — 99214 OFFICE O/P EST MOD 30 MIN: CPT | Mod: 95 | Performed by: SURGERY

## 2020-10-05 ASSESSMENT — PAIN SCALES - GENERAL: PAINLEVEL: NO PAIN (0)
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
## 2020-10-05 NOTE — NURSING NOTE
Chief Complaint   Patient presents with     RECHECK     fistula follow up         BP (!) 160/70 (BP Location: Right arm, Patient Position: Sitting, Cuff Size: Adult Regular)   Pulse 63   Temp 98.3  F (36.8  C)   Wt 88 kg (194 lb 0.1 oz)   SpO2 97%   BMI 31.31 kg/m        BP (!) 160/70 (BP Location: Right arm, Patient Position: Sitting, Cuff Size: Adult Regular)   Pulse 63   Temp 98.3  F (36.8  C)   Wt 88 kg (194 lb 0.1 oz)   SpO2 97%   BMI 31.31 kg/m
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
## 2020-10-08 DIAGNOSIS — Z99.2 ESRD ON DIALYSIS (H): Primary | ICD-10-CM

## 2020-10-08 DIAGNOSIS — Z01.818 PRE-OP EXAM: ICD-10-CM

## 2020-10-08 DIAGNOSIS — N18.6 ESRD ON DIALYSIS (H): Primary | ICD-10-CM

## 2020-10-09 ENCOUNTER — PREP FOR PROCEDURE (OUTPATIENT)
Dept: TRANSPLANT | Facility: CLINIC | Age: 70
End: 2020-10-09

## 2020-10-09 DIAGNOSIS — N18.6 ENCOUNTER REGARDING VASCULAR ACCESS FOR DIALYSIS FOR END-STAGE RENAL DISEASE (H): ICD-10-CM

## 2020-10-09 DIAGNOSIS — Z99.2 ENCOUNTER REGARDING VASCULAR ACCESS FOR DIALYSIS FOR END-STAGE RENAL DISEASE (H): ICD-10-CM

## 2020-10-09 DIAGNOSIS — N18.6 ESRD ON DIALYSIS (H): Primary | ICD-10-CM

## 2020-10-09 DIAGNOSIS — Z99.2 ESRD ON DIALYSIS (H): Primary | ICD-10-CM

## 2020-10-12 NOTE — TELEPHONE ENCOUNTER
FUTURE VISIT INFORMATION      SURGERY INFORMATION:    Date: 20    [facility]    Surgeon:  Kennedy Banks MD Kandaswamy, Raja, MD    Anesthesia Type:  MAC with block    Procedure: CREATION, ARTERIOVENOUS FISTULA, UPPER EXTREMITY WITH INTRAOPERATIVE ULATRASOUND CREATION, GRAFT, ARTERIOVENOUS, UPPER EXTREMITY, USING BOVINE GRAFT WITH INTRAOPERATIVE ULATRASOUND    Consult: ov 10/5    RECORDS REQUESTED FROM:       [facility]    Pertinent Medical History: JONE, hypertension, CHF, PVD    Most recent EKG+ Tracin20    Most recent ECHO: 3/29/18    Most recent Cardiac Stress Test: 19    Most recent PFT's: 18    Most recent Sleep Study:  13

## 2020-10-14 DIAGNOSIS — Z11.59 ENCOUNTER FOR SCREENING FOR OTHER VIRAL DISEASES: Primary | ICD-10-CM

## 2020-10-20 ENCOUNTER — MYC REFILL (OUTPATIENT)
Dept: ENDOCRINOLOGY | Facility: CLINIC | Age: 70
End: 2020-10-20

## 2020-10-20 DIAGNOSIS — Z79.4 TYPE 2 DIABETES MELLITUS WITH HYPERGLYCEMIA, WITH LONG-TERM CURRENT USE OF INSULIN (H): ICD-10-CM

## 2020-10-20 DIAGNOSIS — E11.65 TYPE 2 DIABETES MELLITUS WITH HYPERGLYCEMIA, WITH LONG-TERM CURRENT USE OF INSULIN (H): ICD-10-CM

## 2020-10-28 ENCOUNTER — OFFICE VISIT (OUTPATIENT)
Dept: OPHTHALMOLOGY | Facility: CLINIC | Age: 70
End: 2020-10-28
Attending: OPHTHALMOLOGY
Payer: MEDICARE

## 2020-10-28 DIAGNOSIS — E11.3213 TYPE 2 DIABETES MELLITUS WITH MILD NONPROLIFERATIVE RETINOPATHY OF BOTH EYES AND MACULAR EDEMA, UNSPECIFIED WHETHER LONG TERM INSULIN USE (H): Primary | ICD-10-CM

## 2020-10-28 DIAGNOSIS — E11.3213 TYPE 2 DIABETES MELLITUS WITH MILD NONPROLIFERATIVE RETINOPATHY OF BOTH EYES AND MACULAR EDEMA, UNSPECIFIED WHETHER LONG TERM INSULIN USE (H): ICD-10-CM

## 2020-10-28 PROCEDURE — 99207 FUNDUS PHOTOS OU (BOTH EYES): CPT | Performed by: OPHTHALMOLOGY

## 2020-10-28 PROCEDURE — 92134 CPTRZ OPH DX IMG PST SGM RTA: CPT | Performed by: OPHTHALMOLOGY

## 2020-10-28 PROCEDURE — 92250 FUNDUS PHOTOGRAPHY W/I&R: CPT | Performed by: OPHTHALMOLOGY

## 2020-10-28 PROCEDURE — 99024 POSTOP FOLLOW-UP VISIT: CPT | Performed by: OPHTHALMOLOGY

## 2020-10-28 PROCEDURE — 92235 FLUORESCEIN ANGRPH MLTIFRAME: CPT | Performed by: OPHTHALMOLOGY

## 2020-10-28 PROCEDURE — G0463 HOSPITAL OUTPT CLINIC VISIT: HCPCS

## 2020-10-28 ASSESSMENT — REFRACTION_WEARINGRX
OS_SPHERE: -4.00
OS_CYLINDER: +1.75
OS_AXIS: 135
OS_SPHERE: -3.50
OD_CYLINDER: +1.00
OD_SPHERE: -5.25
OD_CYLINDER: +1.50
OD_AXIS: 075
OS_CYLINDER: +1.75
OD_SPHERE: -1.00
OD_AXIS: 093

## 2020-10-28 ASSESSMENT — VISUAL ACUITY
OS_PH_CC: 20/20
OS_PH_CC+: -3
METHOD: SNELLEN - LINEAR
OD_CC+: -2
OS_CC: 20/30
OD_CC: 20/25

## 2020-10-28 ASSESSMENT — CONF VISUAL FIELD
OD_NORMAL: 1
METHOD: COUNTING FINGERS
OS_NORMAL: 1

## 2020-10-28 ASSESSMENT — CUP TO DISC RATIO
OD_RATIO: 0.3
OS_RATIO: 0.3

## 2020-10-28 ASSESSMENT — PATIENT HEALTH QUESTIONNAIRE - PHQ9: SUM OF ALL RESPONSES TO PHQ QUESTIONS 1-9: 3

## 2020-10-28 ASSESSMENT — SLIT LAMP EXAM - LIDS
COMMENTS: NORMAL
COMMENTS: NORMAL

## 2020-10-28 ASSESSMENT — TONOMETRY
IOP_METHOD: TONOPEN
OD_IOP_MMHG: 8
OS_IOP_MMHG: 8

## 2020-10-28 NOTE — PROGRESS NOTES
CC: follow up Cataract extraction intraocular lens and Diabetic retinopathy   HPI; Postoperative status post PHACOEMULSIFICATION, CATARACT, WITH INTRAOCULAR LENS IMPLANT 8-11-20 right eye   Retina attached  Doing well    Imaging;    Optical Coherence Tomography: 10/28/20   right eye: microaneurysms and cystoid macular edema - stable  Left eye: mild retina irregularity; no cystic changes     fluorescein angiography: 10/28/20   Right eye: blockage of fluorescein angiography on the areas of heme; few microaneurysms; mild late Diabetic macular edema and PP leakage  No neovascularization elsewhere; no neovascularization of the disc.  Left eye: few microaneurysms; mild late Diabetic macular edema; staining of the peripheral Chorioretinal  scars    Assessment & Plan:  1. Moderate nonproliferative diabetic retinopathy and mild - Diabetic macular edema both eyes  - mild stable edema in right eye, stable in left eye   Stable- observe    3. right eye - retinal macroaneurysm   at the sup temp margin of disc may contribute to macular edema;   - MA seems to be getting thrombosed   - status post avastin inj x2 for cystoid macular edema with improvement  - Last DOROTEO 8/15/19 (#1)  - now stable mild cystoid macular edema- will observe. Consider avastin if worsen    4. Mod Hypertensive retinopathy   - Stage 5 kidney disease  - Considering HD   - blood pressure control has been poor in 160s/90s-100s  - Likely contributing to macular edema    5. Pseudophakia both eyes  8-11-20 right eye   Patient happy with her vision     6. History of Macula hole repair left eye by Dr. Daniel  S/p PPV, mp, air-fluid exchange, SF6 gas infusion, left eye 2/14/2012 by Dr. Daniel    Macula hole closed  Observe    7. Dry eye syndrome   Status post punctal plugs   artificial tears  As needed and warm compresses     PLAN:  - Blood pressure (<120/80) and blood glucose (HbA1c <7.0) control discussed with patient.   - Patient advised that failure to adequately  control each may lead to vision loss. The patient expressed understanding.- improved on exam today   - follow up in 2 months with Optical Coherence Tomography.   ~~~~~~~~~~~~~~~~~~~~~~~~~~~~~~~~~~   Complete documentation of historical and exam elements from today's encounter can be found in the full encounter summary report (not reduplicated in this progress note).  I personally obtained the chief complaint(s) and history of present illness.  I confirmed and edited as necessary the review of systems, past medical/surgical history, family history, social history, and examination findings as documented by others; and I examined the patient myself.  I personally reviewed the relevant tests, images, and reports as documented above.  I formulated and edited as necessary the assessment and plan and discussed the findings and management plan with the patient and family    Leanne Jett MD   of Ophthalmology.  Retina Service   Department of Ophthalmology and Visual Neurosciences   [facility]  Phone: [phone]   Fax: [phone]

## 2020-10-28 NOTE — NURSING NOTE
Chief Complaints and History of Present Illnesses   Patient presents with     Diabetic Eye Exam Follow Up     Chief Complaint(s) and History of Present Illness(es)     Diabetic Eye Exam Follow Up     Associated symptoms: floaters.  Negative for blurred vision and flashes    Diabetes Type: Type 2    Pain scale: 0/10              Comments     Type 2 diabetes mellitus with mild nonproliferative retinopathy of both eyes and macular edema, unspecified whether long term insulin use (H). She feels vision is better with her new glasses.     Dixno Agustin COT 1:01 PM October 28, 2020

## 2020-11-04 ENCOUNTER — PRE VISIT (OUTPATIENT)
Dept: SURGERY | Facility: CLINIC | Age: 70
End: 2020-11-04

## 2020-11-04 ENCOUNTER — OFFICE VISIT (OUTPATIENT)
Dept: SURGERY | Facility: CLINIC | Age: 70
End: 2020-11-04
Payer: MEDICARE

## 2020-11-04 ENCOUNTER — ANESTHESIA EVENT (OUTPATIENT)
Dept: SURGERY | Facility: CLINIC | Age: 70
End: 2020-11-04
Payer: MEDICARE

## 2020-11-04 VITALS
SYSTOLIC BLOOD PRESSURE: 152 MMHG | RESPIRATION RATE: 14 BRPM | WEIGHT: 238.1 LBS | OXYGEN SATURATION: 98 % | HEART RATE: 62 BPM | DIASTOLIC BLOOD PRESSURE: 66 MMHG | HEIGHT: 66 IN | BODY MASS INDEX: 38.27 KG/M2

## 2020-11-04 DIAGNOSIS — Z01.818 PREOP EXAMINATION: Primary | ICD-10-CM

## 2020-11-04 PROCEDURE — 99204 OFFICE O/P NEW MOD 45 MIN: CPT | Performed by: CLINICAL NURSE SPECIALIST

## 2020-11-04 RX ORDER — SERTRALINE HYDROCHLORIDE 25 MG/1
50 TABLET, FILM COATED ORAL AT BEDTIME
COMMUNITY
End: 2021-02-16

## 2020-11-04 RX ORDER — SEVELAMER CARBONATE 800 MG/1
800 TABLET, FILM COATED ORAL
COMMUNITY
End: 2021-11-04

## 2020-11-04 RX ORDER — ACETAMINOPHEN 325 MG/1
325-650 TABLET ORAL EVERY 8 HOURS PRN
Status: ON HOLD | COMMUNITY
End: 2020-11-18

## 2020-11-04 ASSESSMENT — PAIN SCALES - GENERAL: PAINLEVEL: NO PAIN (0)

## 2020-11-04 ASSESSMENT — MIFFLIN-ST. JEOR: SCORE: 1611.75

## 2020-11-04 ASSESSMENT — LIFESTYLE VARIABLES: TOBACCO_USE: 1

## 2020-11-04 NOTE — PATIENT INSTRUCTIONS
Preparing for Your Surgery      Name:  Supriya Herr   MRN:  [id number]   :  1950   Today's Date:  2020       Arriving for surgery:  Surgery date:  20  Arrival time:  05:00 am    Restrictions due to COVID 19:  Patients are allowed one visitor in the pre-op period  All visitors must wear a mask  No visitors under 18  No ill visitors   parking is available for anyone with mobility limitations or disabilities.  ([place]  24 hours/ 7 days a week; [facility]  7 am- 3:30 pm, Mon- Fri)    Please come to:   [facility] [place] [address]  [address] [place]  [address]    - ?Park in the Patient Visitor Parking Ramp on [facility].     -    Please proceed to the Surgery Lounge on the 3rd floor. [phone]?     - ?If you are in need of directions, wheelchair or escort please stop at the Information Desk in the lobby.  Inform the information person that you are here for surgery; a wheelchair and escort will be provided to the Surgery Lounge .?     What can I eat or drink?  -  You may eat and drink normally for up to 8 hours before your surgery.   -  You may have clear liquids until 2 hours before surgery.   Examples of clear liquids:  Water  Clear broth  Juices (apple, white grape, white cranberry  and cider) without pulp  Noncarbonated, powder based beverages  (lemonade and Pastor-Aid)  Sodas (Sprite, 7-Up, ginger ale and seltzer)  Coffee or tea (without milk or cream)  Gatorade    -  No Alcohol for at least 24 hours before surgery     Which medicines can I take?  Hold otezla x 2 days before surgery.  Hold Aspirin for 7 days before surgery.   Hold Multivitamins for 7 days before surgery.  Hold Supplements for 7 days before surgery.  Hold Ibuprofen (Advil, Motrin) for 1 day before surgery--unless otherwise directed by surgeon.  Hold Naproxen (Aleve) for 4 days before surgery.    -  DO NOT take these medications the day of surgery:  novolog insulin and sevelamer  if both are normally taken in the morning.  TAKE 14 UNITS OF GLARGINE INSULIN THE NOC BEFORE SURGERY  -  PLEASE TAKE these medications the day of surgery:  Tylenol if needed; take all other scheduled morning medications.  Bring inhaler if using.  How do I prepare myself?  - Please take 2 showers before surgery using Scrubcare or Hibiclens soap.    Use this soap only from the neck to your toes.     Leave the soap on your skin for one minute--then rinse thoroughly.      You may use your own shampoo and conditioner; no other hair products.   - Please remove all jewelry and body piercings.  - No lotions, deodorants or fragrance.  - No makeup or fingernail polish.   - Bring your ID and insurance card.    - All patients are required to have a Covid-19 test within 4 days of surgery/procedure.      -Patients will be contacted by the [facility] scheduling team within 1 week of surgery to make an appointment.      - Patients may call the Scheduling team at [phone] if they have not been scheduled within 4 days of  surgery.      ALL PATIENTS GOING HOME THE SAME DAY OF SURGERY ARE REQUIRED TO HAVE A RESPONSIBLE ADULT TO DRIVE AND BE IN ATTENDANCE WITH THEM FOR 24 HOURS FOLLOWING SURGERY     Questions or Concerns:    - For any questions regarding the day of surgery or your hospital stay, please contact the Pre Admission Nursing Office at [phone].       - If you have health changes between today and your surgery please call your surgeon.       For questions after surgery please call your surgeons office.

## 2020-11-04 NOTE — PROGRESS NOTES
Preoperative Assessment Center Medication History Note    Medication history completed via phone call on November 4, 2020 by this writer. See Epic admission navigator for prior to admission medications. Operating room staff will still need to confirm medications and last dose information on day of surgery.     Medication history interview sources:  patient, patient's daughter, payor information  Haley Ceballos [phone]     Changes made to PTA medication list (reason)  Added: probiotic, epogen, venofer.   Deleted: ketorolac eye drops  Changed: renvela dose    Additional medication history information (including reliability of information, actions taken by pharmacist):    -- Clindamycin for cellulitis w/in past 1 month, has cleared up.   -- No recent (within 30 days) course of systemic steroids  -- Patient declines being on any other prescription or over-the-counter medications     Prior to Admission medications    Medication Sig Last Dose Taking? Auth Provider   acetaminophen (TYLENOL) 325 MG tablet Take 325-650 mg by mouth every 8 hours as needed for mild pain Taking Yes Unknown, Entered By History   albuterol (PROAIR HFA/PROVENTIL HFA/VENTOLIN HFA) 108 (90 Base) MCG/ACT inhaler Inhale 2 puffs into the lungs every 6 hours as needed for shortness of breath / dyspnea or wheezing Taking Yes Noam Jackson MD   amLODIPine (NORVASC) 5 MG tablet Take 1 tablet (5 mg) by mouth 2 times daily Taking Yes Silva Guerrero NP   apremilast (OTEZLA) 30 MG tablet Take 1 tablet (30 mg) by mouth daily Taking Yes Jarrett Dior MD   atorvastatin (LIPITOR) 20 MG tablet Take 20 mg by mouth daily Taking Yes Reported, Patient   carvedilol (COREG) 25 MG tablet Take 1 tablet (25 mg) by mouth 2 times daily (with meals) Taking Yes Karen Lynn NP   desonide (DESOWEN) 0.05 % external ointment Apply topically as needed Taking Yes Reported, Patient   Epoetin Martínez (EPOGEN IJ) Inject 800 Units into the vein three times  "a week tues thurs sat at dialysis Taking Yes Unknown, Entered By History   furosemide (LASIX) 80 MG tablet Take 1 tablet (80 mg) by mouth 2 times daily Taking Yes Karen Lynn NP   insulin aspart (NOVOLOG FLEXPEN) 100 UNIT/ML pen INJECT 4 UNITS SUBCUTANEOUSLY WITH BREAKFAST, LUNCH AND DINNER. Taking Yes Arabella Kamara PA-C   insulin glargine (BASAGLAR KWIKPEN) 100 UNIT/ML pen Inject 18 Units Subcutaneous At Bedtime Pt to take 14 units if pt is NPO for surgery Taking Yes Jalyn Madrigal MD   Iron Sucrose (VENOFER IV) Inject 50 mg into the vein twice a week At dialysis session (tues/Sat) Taking Yes Unknown, Entered By History   Probiotic Product (PROBIOTIC PO) Take 1 capsule by mouth daily Taking Yes Unknown, Entered By History   RENVELA 800 MG tablet Take 1,600 mg by mouth 3 times daily (with meals)  Taking Yes Reported, Patient   sertraline (ZOLOFT) 25 MG tablet Take 50 mg by mouth At Bedtime Taking Yes Unknown, Entered By History   sevelamer carbonate (RENVELA) 800 MG tablet Take 800 mg by mouth Take with snacks or supplements Taking Yes Unknown, Entered By History   vitamin D3 (CHOLECALCIFEROL) 2000 units (50 mcg) tablet Take 1 tablet (2,000 Units) by mouth daily  Patient taking differently: Take 2,000 Units by mouth At Bedtime  Taking Yes Silva Guerrero NP   blood glucose (ONETOUCH ULTRA) test strip TEST YOUR BLOOD SUGAR 3-4 TIMES PER DAY.   Silva Guerrero NP   insulin pen needle (ULTICARE MINI) 31G X 6 MM Use daily or as directed.   Noam Jackson MD   order for DME Equipment being ordered: mattress overlay for hospital bed  Wt. 192#  Height 5'5\"  99 months/Lifetime   Noam Jackson MD   order for DME 1 wheelchair   Noam Jackson MD        Medication history completed by: Jakub Evans [facility]    "
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
## 2020-11-10 ENCOUNTER — TELEPHONE (OUTPATIENT)
Dept: TRANSPLANT | Facility: CLINIC | Age: 70
End: 2020-11-10
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
## 2020-11-10 NOTE — TELEPHONE ENCOUNTER
Spoke with daughter, Caroline, who reports the Mom had cellulitsis with a small scab the end of September, which has not all cleared. Dr. Jackson did f/u with patient after her UC visit. Patient has not started PT due to COVID concerns but is looking forward to doing so when is safe. Requested any updates with health concerns in the meantime. Caroline verbalized plan with writer.

## 2020-11-11 DIAGNOSIS — Z99.2 ESRD ON DIALYSIS (H): Primary | ICD-10-CM

## 2020-11-11 DIAGNOSIS — N18.6 ESRD ON DIALYSIS (H): Primary | ICD-10-CM

## 2020-11-11 DIAGNOSIS — Z45.2 ENCOUNTER FOR ADJUSTMENT AND MANAGEMENT OF VASCULAR ACCESS DEVICE: ICD-10-CM

## 2020-11-11 RX ORDER — CLINDAMYCIN PHOSPHATE 900 MG/50ML
900 INJECTION, SOLUTION INTRAVENOUS
Status: CANCELLED | OUTPATIENT
Start: 2020-11-16

## 2020-11-13 ENCOUNTER — OFFICE VISIT (OUTPATIENT)
Dept: ORTHOPEDICS | Facility: CLINIC | Age: 70
End: 2020-11-13
Payer: MEDICARE

## 2020-11-13 DIAGNOSIS — B35.1 OM (ONYCHOMYCOSIS): ICD-10-CM

## 2020-11-13 DIAGNOSIS — E11.40 TYPE 2 DIABETES MELLITUS WITH DIABETIC NEUROPATHY, WITH LONG-TERM CURRENT USE OF INSULIN (H): Primary | ICD-10-CM

## 2020-11-13 DIAGNOSIS — Z79.4 TYPE 2 DIABETES MELLITUS WITH DIABETIC NEUROPATHY, WITH LONG-TERM CURRENT USE OF INSULIN (H): Primary | ICD-10-CM

## 2020-11-13 DIAGNOSIS — L84 TYLOMA: ICD-10-CM

## 2020-11-13 DIAGNOSIS — Z89.612 S/P AKA (ABOVE KNEE AMPUTATION) UNILATERAL, LEFT (H): ICD-10-CM

## 2020-11-13 DIAGNOSIS — Z11.59 ENCOUNTER FOR SCREENING FOR OTHER VIRAL DISEASES: ICD-10-CM

## 2020-11-13 PROCEDURE — 99213 OFFICE O/P EST LOW 20 MIN: CPT | Performed by: PODIATRIST

## 2020-11-13 PROCEDURE — U0003 INFECTIOUS AGENT DETECTION BY NUCLEIC ACID (DNA OR RNA); SEVERE ACUTE RESPIRATORY SYNDROME CORONAVIRUS 2 (SARS-COV-2) (CORONAVIRUS DISEASE [COVID-19]), AMPLIFIED PROBE TECHNIQUE, MAKING USE OF HIGH THROUGHPUT TECHNOLOGIES AS DESCRIBED BY CMS-2020-01-R: HCPCS | Performed by: PATHOLOGY

## 2020-11-13 NOTE — PROGRESS NOTES
Chief Complaint   Patient presents with     RECHECK     Follow up foot care              Allergies   Allergen Reactions     Penicillins Rash     Unasyn Rash     No evidence SJS, but very uncomfortable and precipitated multiple provider visits. Would not use penicillins again if other options available.          Subjective: Supriya is a 69 year old female who presents to the clinic today for a diabetic foot exam and management. She relates that she has No new foot complaints.      Objective    Hemoglobin A1C   Date Value Ref Range Status   06/22/2018 6.0 (H) 0 - 5.6 % Final     Comment:     Normal <5.7% Prediabetes 5.7-6.4%  Diabetes 6.5% or higher - adopted from ADA   consensus guidelines.           Non-palpable DP and PT pulses BL.   Equinus noted BL. Pes planus with rigid toe deformities noted to lesser digits on the right. Left AKA noted.   Nails are thickened, discolored, elongated, with subungual debris consistent with onychomycosis.    Scabbed venous ulcer on the anterior right leg. No s/s of infection.  No open lesion associated. No bleeding. No pain to the area. Small scar noted to the plantar right 1st interspace. No s/s of infection. Hyperkeratosis to the area. Irritation erythema to the dorsal right foot.      Assessment: DM2 with left AKA and neuropathy - presenting for a diabetic foot exam.   Onychomycosis.   Tyloma     Plan:   - Pt seen and evaluated  - Nails debrided x 5.  - Tyloma debrided x 1  - Cont compression socks.  - See again in 3 months.

## 2020-11-13 NOTE — NURSING NOTE
Reason For Visit:   Chief Complaint   Patient presents with     RECHECK     Follow up foot care       There were no vitals taken for this visit.    Pain Assessment  Patient Currently in Pain: Mariano Lu, ATC

## 2020-11-13 NOTE — LETTER
11/13/2020         RE: Supriya Herr  [address]  [facility] [address]        Dear Colleague,    Thank you for referring your patient, Supriya Herr, to the [facility] [place]. Please see a copy of my visit note below.    Chief Complaint   Patient presents with     RECHECK     Follow up foot care              Allergies   Allergen Reactions     Penicillins Rash     Unasyn Rash     No evidence SJS, but very uncomfortable and precipitated multiple provider visits. Would not use penicillins again if other options available.          Subjective: Supriya is a 69 year old female who presents to the clinic today for a diabetic foot exam and management. She relates that she has No new foot complaints.      Objective    Hemoglobin A1C   Date Value Ref Range Status   06/22/2018 6.0 (H) 0 - 5.6 % Final     Comment:     Normal <5.7% Prediabetes 5.7-6.4%  Diabetes 6.5% or higher - adopted from ADA   consensus guidelines.           Non-palpable DP and PT pulses BL.   Equinus noted BL. Pes planus with rigid toe deformities noted to lesser digits on the right. Left AKA noted.   Nails are thickened, discolored, elongated, with subungual debris consistent with onychomycosis.    Scabbed venous ulcer on the anterior right leg. No s/s of infection.  No open lesion associated. No bleeding. No pain to the area. Small scar noted to the plantar right 1st interspace. No s/s of infection. Hyperkeratosis to the area. Irritation erythema to the dorsal right foot.      Assessment: DM2 with left AKA and neuropathy - presenting for a diabetic foot exam.   Onychomycosis.   Tyloma     Plan:   - Pt seen and evaluated  - Nails debrided x 5.  - Tyloma debrided x 1  - Cont compression socks.  - See again in 3 months.      Eleazar Pack, FRAN

## 2020-11-14 LAB
SARS-COV-2 RNA SPEC QL NAA+PROBE: NOT DETECTED
SPECIMEN SOURCE: NORMAL

## 2020-11-16 ENCOUNTER — ANESTHESIA (OUTPATIENT)
Dept: SURGERY | Facility: CLINIC | Age: 70
End: 2020-11-16
Payer: MEDICARE

## 2020-11-16 ENCOUNTER — ANCILLARY PROCEDURE (OUTPATIENT)
Dept: ULTRASOUND IMAGING | Facility: CLINIC | Age: 70
End: 2020-11-16
Payer: MEDICARE

## 2020-11-16 ENCOUNTER — HEALTH MAINTENANCE LETTER (OUTPATIENT)
Age: 70
End: 2020-11-16

## 2020-11-16 ENCOUNTER — HOSPITAL ENCOUNTER (OUTPATIENT)
Facility: CLINIC | Age: 70
Discharge: HOME | End: 2020-11-18
Attending: SURGERY | Admitting: SURGERY
Payer: MEDICARE

## 2020-11-16 DIAGNOSIS — Z99.2 ESRD ON DIALYSIS (H): ICD-10-CM

## 2020-11-16 DIAGNOSIS — Z99.2 ENCOUNTER REGARDING VASCULAR ACCESS FOR DIALYSIS FOR END-STAGE RENAL DISEASE (H): ICD-10-CM

## 2020-11-16 DIAGNOSIS — N18.6 ENCOUNTER REGARDING VASCULAR ACCESS FOR DIALYSIS FOR END-STAGE RENAL DISEASE (H): ICD-10-CM

## 2020-11-16 DIAGNOSIS — Z45.2 ENCOUNTER FOR ADJUSTMENT AND MANAGEMENT OF VASCULAR ACCESS DEVICE: ICD-10-CM

## 2020-11-16 DIAGNOSIS — N18.6 ESRD ON DIALYSIS (H): ICD-10-CM

## 2020-11-16 LAB
ABO + RH BLD: NORMAL
ABO + RH BLD: NORMAL
APTT PPP: 34 SEC (ref 22–37)
BASOPHILS # BLD AUTO: 0 10E9/L (ref 0–0.2)
BASOPHILS NFR BLD AUTO: 0.7 %
BLD GP AB SCN SERPL QL: NORMAL
BLOOD BANK CMNT PATIENT-IMP: NORMAL
CREAT SERPL-MCNC: 5.08 MG/DL (ref 0.52–1.04)
DIFFERENTIAL METHOD BLD: ABNORMAL
EOSINOPHIL # BLD AUTO: 0.2 10E9/L (ref 0–0.7)
EOSINOPHIL NFR BLD AUTO: 3.8 %
ERYTHROCYTE [DISTWIDTH] IN BLOOD BY AUTOMATED COUNT: 13.7 % (ref 10–15)
GFR SERPL CREATININE-BSD FRML MDRD: 8 ML/MIN/{1.73_M2}
GLUCOSE BLDC GLUCOMTR-MCNC: 110 MG/DL (ref 70–99)
GLUCOSE BLDC GLUCOMTR-MCNC: 131 MG/DL (ref 70–99)
GLUCOSE BLDC GLUCOMTR-MCNC: 302 MG/DL (ref 70–99)
GLUCOSE BLDC GLUCOMTR-MCNC: 86 MG/DL (ref 70–99)
GLUCOSE BLDC GLUCOMTR-MCNC: 95 MG/DL (ref 70–99)
GLUCOSE SERPL-MCNC: 99 MG/DL (ref 70–99)
HCT VFR BLD AUTO: 33.7 % (ref 35–47)
HGB BLD-MCNC: 10.5 G/DL (ref 11.7–15.7)
IMM GRANULOCYTES # BLD: 0 10E9/L (ref 0–0.4)
IMM GRANULOCYTES NFR BLD: 0.2 %
INR PPP: 1.15 (ref 0.86–1.14)
LYMPHOCYTES # BLD AUTO: 1.8 10E9/L (ref 0.8–5.3)
LYMPHOCYTES NFR BLD AUTO: 29.4 %
MCH RBC QN AUTO: 30.2 PG (ref 26.5–33)
MCHC RBC AUTO-ENTMCNC: 31.2 G/DL (ref 31.5–36.5)
MCV RBC AUTO: 97 FL (ref 78–100)
MONOCYTES # BLD AUTO: 0.6 10E9/L (ref 0–1.3)
MONOCYTES NFR BLD AUTO: 9.8 %
NEUTROPHILS # BLD AUTO: 3.4 10E9/L (ref 1.6–8.3)
NEUTROPHILS NFR BLD AUTO: 56.1 %
NRBC # BLD AUTO: 0 10*3/UL
NRBC BLD AUTO-RTO: 0 /100
PLATELET # BLD AUTO: 227 10E9/L (ref 150–450)
POTASSIUM SERPL-SCNC: 5.4 MMOL/L (ref 3.4–5.3)
RBC # BLD AUTO: 3.48 10E12/L (ref 3.8–5.2)
SPECIMEN EXP DATE BLD: NORMAL
WBC # BLD AUTO: 6.1 10E9/L (ref 4–11)

## 2020-11-16 PROCEDURE — 258N000003 HC RX IP 258 OP 636: Performed by: SURGERY

## 2020-11-16 PROCEDURE — 360N000023 HC SURGERY LEVEL 3 EA 15 ADDTL MIN UMMC: Performed by: SURGERY

## 2020-11-16 PROCEDURE — 258N000003 HC RX IP 258 OP 636

## 2020-11-16 PROCEDURE — 250N000013 HC RX MED GY IP 250 OP 250 PS 637: Performed by: SURGERY

## 2020-11-16 PROCEDURE — 250N000009 HC RX 250

## 2020-11-16 PROCEDURE — 250N000009 HC RX 250: Performed by: CLINICAL NURSE SPECIALIST

## 2020-11-16 PROCEDURE — 999N001017 HC STATISTIC GLUCOSE BY METER IP

## 2020-11-16 PROCEDURE — 36415 COLL VENOUS BLD VENIPUNCTURE: CPT | Performed by: CLINICAL NURSE SPECIALIST

## 2020-11-16 PROCEDURE — 250N000013 HC RX MED GY IP 250 OP 250 PS 637: Performed by: PHYSICIAN ASSISTANT

## 2020-11-16 PROCEDURE — 86900 BLOOD TYPING SEROLOGIC ABO: CPT | Performed by: ANESTHESIOLOGY

## 2020-11-16 PROCEDURE — 96372 THER/PROPH/DIAG INJ SC/IM: CPT | Mod: 59 | Performed by: PHYSICIAN ASSISTANT

## 2020-11-16 PROCEDURE — 84132 ASSAY OF SERUM POTASSIUM: CPT | Performed by: CLINICAL NURSE SPECIALIST

## 2020-11-16 PROCEDURE — 120N000011 HC R&B TRANSPLANT UMMC

## 2020-11-16 PROCEDURE — 250N000011 HC RX IP 250 OP 636

## 2020-11-16 PROCEDURE — 250N000011 HC RX IP 250 OP 636: Performed by: SURGERY

## 2020-11-16 PROCEDURE — 360N000022 HC SURGERY LEVEL 3 1ST 30 MIN - UMMC: Performed by: SURGERY

## 2020-11-16 PROCEDURE — 250N000012 HC RX MED GY IP 250 OP 636 PS 637: Performed by: PHYSICIAN ASSISTANT

## 2020-11-16 PROCEDURE — 272N000002 HC OR SUPPLY OTHER OPNP: Performed by: SURGERY

## 2020-11-16 PROCEDURE — 76998 US GUIDE INTRAOP: CPT | Mod: 26 | Performed by: SURGERY

## 2020-11-16 PROCEDURE — 250N000009 HC RX 250: Performed by: STUDENT IN AN ORGANIZED HEALTH CARE EDUCATION/TRAINING PROGRAM

## 2020-11-16 PROCEDURE — 85610 PROTHROMBIN TIME: CPT | Performed by: CLINICAL NURSE SPECIALIST

## 2020-11-16 PROCEDURE — 370N000001 HC ANESTHESIA TECHNICAL FEE, 1ST 30 MIN: Performed by: SURGERY

## 2020-11-16 PROCEDURE — 370N000002 HC ANESTHESIA TECHNICAL FEE, EACH ADDTL 15 MIN: Performed by: SURGERY

## 2020-11-16 PROCEDURE — 85730 THROMBOPLASTIN TIME PARTIAL: CPT | Performed by: CLINICAL NURSE SPECIALIST

## 2020-11-16 PROCEDURE — 82947 ASSAY GLUCOSE BLOOD QUANT: CPT | Performed by: CLINICAL NURSE SPECIALIST

## 2020-11-16 PROCEDURE — 82565 ASSAY OF CREATININE: CPT | Performed by: CLINICAL NURSE SPECIALIST

## 2020-11-16 PROCEDURE — 250N000011 HC RX IP 250 OP 636: Performed by: STUDENT IN AN ORGANIZED HEALTH CARE EDUCATION/TRAINING PROGRAM

## 2020-11-16 PROCEDURE — 85025 COMPLETE CBC W/AUTO DIFF WBC: CPT | Performed by: CLINICAL NURSE SPECIALIST

## 2020-11-16 PROCEDURE — 86901 BLOOD TYPING SEROLOGIC RH(D): CPT | Performed by: ANESTHESIOLOGY

## 2020-11-16 PROCEDURE — 999N000139 HC STATISTIC PRE-PROCEDURE ASSESSMENT II: Performed by: SURGERY

## 2020-11-16 PROCEDURE — 86850 RBC ANTIBODY SCREEN: CPT | Performed by: ANESTHESIOLOGY

## 2020-11-16 PROCEDURE — 761N000003 HC RECOVERY PHASE 1 LEVEL 2 FIRST HR: Performed by: SURGERY

## 2020-11-16 PROCEDURE — 272N000001 HC OR GENERAL SUPPLY STERILE: Performed by: SURGERY

## 2020-11-16 PROCEDURE — 36821 AV FUSION DIRECT ANY SITE: CPT | Performed by: SURGERY

## 2020-11-16 RX ORDER — HEPARIN SODIUM 1000 [USP'U]/ML
INJECTION, SOLUTION INTRAVENOUS; SUBCUTANEOUS PRN
Status: DISCONTINUED | OUTPATIENT
Start: 2020-11-16 | End: 2020-11-16

## 2020-11-16 RX ORDER — CLINDAMYCIN PHOSPHATE 900 MG/50ML
900 INJECTION, SOLUTION INTRAVENOUS
Status: COMPLETED | OUTPATIENT
Start: 2020-11-16 | End: 2020-11-16

## 2020-11-16 RX ORDER — FENTANYL CITRATE 50 UG/ML
25-50 INJECTION, SOLUTION INTRAMUSCULAR; INTRAVENOUS
Status: DISCONTINUED | OUTPATIENT
Start: 2020-11-16 | End: 2020-11-16 | Stop reason: HOSPADM

## 2020-11-16 RX ORDER — SEVELAMER CARBONATE 800 MG/1
800 TABLET, FILM COATED ORAL
Status: DISCONTINUED | OUTPATIENT
Start: 2020-11-16 | End: 2020-11-18 | Stop reason: HOSPADM

## 2020-11-16 RX ORDER — EPHEDRINE SULFATE 50 MG/ML
INJECTION, SOLUTION INTRAMUSCULAR; INTRAVENOUS; SUBCUTANEOUS PRN
Status: DISCONTINUED | OUTPATIENT
Start: 2020-11-16 | End: 2020-11-16

## 2020-11-16 RX ORDER — SEVELAMER CARBONATE 800 MG/1
1600 TABLET, FILM COATED ORAL
Status: DISCONTINUED | OUTPATIENT
Start: 2020-11-16 | End: 2020-11-18 | Stop reason: HOSPADM

## 2020-11-16 RX ORDER — BUPIVACAINE HYDROCHLORIDE 2.5 MG/ML
INJECTION, SOLUTION EPIDURAL; INFILTRATION; INTRACAUDAL PRN
Status: DISCONTINUED | OUTPATIENT
Start: 2020-11-16 | End: 2020-11-16

## 2020-11-16 RX ORDER — LABETALOL HYDROCHLORIDE 5 MG/ML
10 INJECTION, SOLUTION INTRAVENOUS EVERY 10 MIN PRN
Status: DISCONTINUED | OUTPATIENT
Start: 2020-11-16 | End: 2020-11-16 | Stop reason: HOSPADM

## 2020-11-16 RX ORDER — PROPOFOL 10 MG/ML
INJECTION, EMULSION INTRAVENOUS CONTINUOUS PRN
Status: DISCONTINUED | OUTPATIENT
Start: 2020-11-16 | End: 2020-11-16

## 2020-11-16 RX ORDER — ALBUTEROL SULFATE 90 UG/1
2 AEROSOL, METERED RESPIRATORY (INHALATION) EVERY 6 HOURS PRN
Status: DISCONTINUED | OUTPATIENT
Start: 2020-11-16 | End: 2020-11-18 | Stop reason: HOSPADM

## 2020-11-16 RX ORDER — NALOXONE HYDROCHLORIDE 0.4 MG/ML
.1-.4 INJECTION, SOLUTION INTRAMUSCULAR; INTRAVENOUS; SUBCUTANEOUS
Status: DISCONTINUED | OUTPATIENT
Start: 2020-11-16 | End: 2020-11-16 | Stop reason: HOSPADM

## 2020-11-16 RX ORDER — OXYCODONE HYDROCHLORIDE 5 MG/1
5 TABLET ORAL
Status: DISCONTINUED | OUTPATIENT
Start: 2020-11-16 | End: 2020-11-18 | Stop reason: HOSPADM

## 2020-11-16 RX ORDER — AMLODIPINE BESYLATE 5 MG/1
5 TABLET ORAL 2 TIMES DAILY
Status: DISCONTINUED | OUTPATIENT
Start: 2020-11-16 | End: 2020-11-18 | Stop reason: HOSPADM

## 2020-11-16 RX ORDER — DEXAMETHASONE SODIUM PHOSPHATE 4 MG/ML
INJECTION, SOLUTION INTRA-ARTICULAR; INTRALESIONAL; INTRAMUSCULAR; INTRAVENOUS; SOFT TISSUE PRN
Status: DISCONTINUED | OUTPATIENT
Start: 2020-11-16 | End: 2020-11-16

## 2020-11-16 RX ORDER — AMOXICILLIN 250 MG
1-2 CAPSULE ORAL 2 TIMES DAILY
Qty: 30 TABLET | Refills: 0 | Status: SHIPPED | OUTPATIENT
Start: 2020-11-16 | End: 2020-11-18

## 2020-11-16 RX ORDER — PROPOFOL 10 MG/ML
INJECTION, EMULSION INTRAVENOUS PRN
Status: DISCONTINUED | OUTPATIENT
Start: 2020-11-16 | End: 2020-11-16

## 2020-11-16 RX ORDER — ONDANSETRON 4 MG/1
4 TABLET, ORALLY DISINTEGRATING ORAL EVERY 30 MIN PRN
Status: DISCONTINUED | OUTPATIENT
Start: 2020-11-16 | End: 2020-11-16 | Stop reason: HOSPADM

## 2020-11-16 RX ORDER — CARVEDILOL 25 MG/1
25 TABLET ORAL 2 TIMES DAILY WITH MEALS
Status: DISCONTINUED | OUTPATIENT
Start: 2020-11-16 | End: 2020-11-18 | Stop reason: HOSPADM

## 2020-11-16 RX ORDER — ONDANSETRON 2 MG/ML
4 INJECTION INTRAMUSCULAR; INTRAVENOUS EVERY 6 HOURS PRN
Status: DISCONTINUED | OUTPATIENT
Start: 2020-11-16 | End: 2020-11-18 | Stop reason: HOSPADM

## 2020-11-16 RX ORDER — SODIUM CHLORIDE, SODIUM LACTATE, POTASSIUM CHLORIDE, CALCIUM CHLORIDE 600; 310; 30; 20 MG/100ML; MG/100ML; MG/100ML; MG/100ML
INJECTION, SOLUTION INTRAVENOUS CONTINUOUS
Status: DISCONTINUED | OUTPATIENT
Start: 2020-11-16 | End: 2020-11-16 | Stop reason: HOSPADM

## 2020-11-16 RX ORDER — LIDOCAINE HYDROCHLORIDE 20 MG/ML
INJECTION, SOLUTION INFILTRATION; PERINEURAL PRN
Status: DISCONTINUED | OUTPATIENT
Start: 2020-11-16 | End: 2020-11-16

## 2020-11-16 RX ORDER — BUPIVACAINE HYDROCHLORIDE 5 MG/ML
INJECTION, SOLUTION EPIDURAL; INTRACAUDAL PRN
Status: DISCONTINUED | OUTPATIENT
Start: 2020-11-16 | End: 2020-11-16

## 2020-11-16 RX ORDER — NICOTINE POLACRILEX 4 MG
15-30 LOZENGE BUCCAL
Status: DISCONTINUED | OUTPATIENT
Start: 2020-11-16 | End: 2020-11-18 | Stop reason: HOSPADM

## 2020-11-16 RX ORDER — ATORVASTATIN CALCIUM 20 MG/1
20 TABLET, FILM COATED ORAL DAILY
Status: DISCONTINUED | OUTPATIENT
Start: 2020-11-16 | End: 2020-11-18 | Stop reason: HOSPADM

## 2020-11-16 RX ORDER — HYDRALAZINE HYDROCHLORIDE 20 MG/ML
2.5-5 INJECTION INTRAMUSCULAR; INTRAVENOUS EVERY 10 MIN PRN
Status: DISCONTINUED | OUTPATIENT
Start: 2020-11-16 | End: 2020-11-16 | Stop reason: HOSPADM

## 2020-11-16 RX ORDER — GLYCOPYRROLATE 0.2 MG/ML
INJECTION, SOLUTION INTRAMUSCULAR; INTRAVENOUS PRN
Status: DISCONTINUED | OUTPATIENT
Start: 2020-11-16 | End: 2020-11-16

## 2020-11-16 RX ORDER — SODIUM CHLORIDE, SODIUM LACTATE, POTASSIUM CHLORIDE, CALCIUM CHLORIDE 600; 310; 30; 20 MG/100ML; MG/100ML; MG/100ML; MG/100ML
INJECTION, SOLUTION INTRAVENOUS CONTINUOUS PRN
Status: DISCONTINUED | OUTPATIENT
Start: 2020-11-16 | End: 2020-11-16

## 2020-11-16 RX ORDER — LIDOCAINE 40 MG/G
CREAM TOPICAL
Status: DISCONTINUED | OUTPATIENT
Start: 2020-11-16 | End: 2020-11-18 | Stop reason: HOSPADM

## 2020-11-16 RX ORDER — ACETAMINOPHEN 325 MG/1
325-650 TABLET ORAL EVERY 8 HOURS PRN
Status: DISCONTINUED | OUTPATIENT
Start: 2020-11-16 | End: 2020-11-18 | Stop reason: HOSPADM

## 2020-11-16 RX ORDER — FLUMAZENIL 0.1 MG/ML
0.2 INJECTION, SOLUTION INTRAVENOUS
Status: DISCONTINUED | OUTPATIENT
Start: 2020-11-16 | End: 2020-11-16 | Stop reason: HOSPADM

## 2020-11-16 RX ORDER — ONDANSETRON 4 MG/1
4 TABLET, ORALLY DISINTEGRATING ORAL EVERY 6 HOURS PRN
Status: DISCONTINUED | OUTPATIENT
Start: 2020-11-16 | End: 2020-11-18 | Stop reason: HOSPADM

## 2020-11-16 RX ORDER — ONDANSETRON 2 MG/ML
4 INJECTION INTRAMUSCULAR; INTRAVENOUS EVERY 30 MIN PRN
Status: DISCONTINUED | OUTPATIENT
Start: 2020-11-16 | End: 2020-11-16 | Stop reason: HOSPADM

## 2020-11-16 RX ORDER — DEXTROSE MONOHYDRATE 25 G/50ML
25-50 INJECTION, SOLUTION INTRAVENOUS
Status: DISCONTINUED | OUTPATIENT
Start: 2020-11-16 | End: 2020-11-18 | Stop reason: HOSPADM

## 2020-11-16 RX ORDER — ACETAMINOPHEN 325 MG/1
650 TABLET ORAL
Status: DISCONTINUED | OUTPATIENT
Start: 2020-11-16 | End: 2020-11-16

## 2020-11-16 RX ORDER — LIDOCAINE 40 MG/G
CREAM TOPICAL
Status: DISCONTINUED | OUTPATIENT
Start: 2020-11-16 | End: 2020-11-16 | Stop reason: HOSPADM

## 2020-11-16 RX ORDER — VITAMIN B COMPLEX
50 TABLET ORAL AT BEDTIME
Status: DISCONTINUED | OUTPATIENT
Start: 2020-11-16 | End: 2020-11-18 | Stop reason: HOSPADM

## 2020-11-16 RX ORDER — NALOXONE HYDROCHLORIDE 0.4 MG/ML
.1-.4 INJECTION, SOLUTION INTRAMUSCULAR; INTRAVENOUS; SUBCUTANEOUS
Status: DISCONTINUED | OUTPATIENT
Start: 2020-11-16 | End: 2020-11-18 | Stop reason: HOSPADM

## 2020-11-16 RX ORDER — NALOXONE HYDROCHLORIDE 0.4 MG/ML
.1-.4 INJECTION, SOLUTION INTRAMUSCULAR; INTRAVENOUS; SUBCUTANEOUS
Status: DISCONTINUED | OUTPATIENT
Start: 2020-11-16 | End: 2020-11-16

## 2020-11-16 RX ORDER — OXYCODONE HYDROCHLORIDE 5 MG/1
5 TABLET ORAL EVERY 6 HOURS PRN
Qty: 6 TABLET | Refills: 0 | Status: SHIPPED | OUTPATIENT
Start: 2020-11-16 | End: 2020-11-18

## 2020-11-16 RX ORDER — PROCHLORPERAZINE MALEATE 5 MG
5 TABLET ORAL EVERY 6 HOURS PRN
Status: DISCONTINUED | OUTPATIENT
Start: 2020-11-16 | End: 2020-11-18 | Stop reason: HOSPADM

## 2020-11-16 RX ORDER — FUROSEMIDE 40 MG
80 TABLET ORAL 2 TIMES DAILY
Status: DISCONTINUED | OUTPATIENT
Start: 2020-11-16 | End: 2020-11-18 | Stop reason: HOSPADM

## 2020-11-16 RX ORDER — ACETAMINOPHEN 325 MG/1
650 TABLET ORAL EVERY 4 HOURS PRN
Qty: 50 TABLET | Refills: 0 | Status: SHIPPED | OUTPATIENT
Start: 2020-11-16

## 2020-11-16 RX ORDER — SODIUM CHLORIDE 9 MG/ML
INJECTION, SOLUTION INTRAVENOUS CONTINUOUS
Status: DISCONTINUED | OUTPATIENT
Start: 2020-11-16 | End: 2020-11-16 | Stop reason: HOSPADM

## 2020-11-16 RX ORDER — ONDANSETRON 2 MG/ML
INJECTION INTRAMUSCULAR; INTRAVENOUS PRN
Status: DISCONTINUED | OUTPATIENT
Start: 2020-11-16 | End: 2020-11-16

## 2020-11-16 RX ADMIN — ATORVASTATIN CALCIUM 20 MG: 20 TABLET, FILM COATED ORAL at 17:19

## 2020-11-16 RX ADMIN — PROPOFOL 120 MCG/KG/MIN: 10 INJECTION, EMULSION INTRAVENOUS at 07:46

## 2020-11-16 RX ADMIN — GLYCOPYRROLATE 0.2 MG: 0.2 INJECTION, SOLUTION INTRAMUSCULAR; INTRAVENOUS at 07:39

## 2020-11-16 RX ADMIN — CLINDAMYCIN IN 5 PERCENT DEXTROSE 900 MG: 18 INJECTION, SOLUTION INTRAVENOUS at 08:15

## 2020-11-16 RX ADMIN — ROCURONIUM BROMIDE 100 MG: 10 INJECTION INTRAVENOUS at 07:43

## 2020-11-16 RX ADMIN — DEXAMETHASONE SODIUM PHOSPHATE 4 MG: 4 INJECTION, SOLUTION INTRA-ARTICULAR; INTRALESIONAL; INTRAMUSCULAR; INTRAVENOUS; SOFT TISSUE at 08:20

## 2020-11-16 RX ADMIN — BUPIVACAINE HYDROCHLORIDE 20 ML: 5 INJECTION, SOLUTION EPIDURAL; INTRACAUDAL at 07:15

## 2020-11-16 RX ADMIN — ONDANSETRON 4 MG: 2 INJECTION INTRAMUSCULAR; INTRAVENOUS at 11:23

## 2020-11-16 RX ADMIN — SUGAMMADEX 100 MG: 100 INJECTION, SOLUTION INTRAVENOUS at 10:49

## 2020-11-16 RX ADMIN — ACETAMINOPHEN 650 MG: 325 TABLET, FILM COATED ORAL at 11:51

## 2020-11-16 RX ADMIN — LIDOCAINE HYDROCHLORIDE 50 MG: 20 INJECTION, SOLUTION INFILTRATION; PERINEURAL at 07:43

## 2020-11-16 RX ADMIN — ONDANSETRON 4 MG: 2 INJECTION INTRAMUSCULAR; INTRAVENOUS at 10:47

## 2020-11-16 RX ADMIN — Medication 10 MG: at 07:59

## 2020-11-16 RX ADMIN — Medication 50 MCG: at 21:14

## 2020-11-16 RX ADMIN — INSULIN GLARGINE 14 UNITS: 100 INJECTION, SOLUTION SUBCUTANEOUS at 21:14

## 2020-11-16 RX ADMIN — SODIUM CHLORIDE, POTASSIUM CHLORIDE, SODIUM LACTATE AND CALCIUM CHLORIDE: 600; 310; 30; 20 INJECTION, SOLUTION INTRAVENOUS at 07:30

## 2020-11-16 RX ADMIN — SERTRALINE HYDROCHLORIDE 50 MG: 50 TABLET ORAL at 21:14

## 2020-11-16 RX ADMIN — PROPOFOL 30 MG: 10 INJECTION, EMULSION INTRAVENOUS at 10:58

## 2020-11-16 RX ADMIN — FENTANYL CITRATE 50 MCG: 50 INJECTION, SOLUTION INTRAMUSCULAR; INTRAVENOUS at 07:09

## 2020-11-16 RX ADMIN — HEPARIN SODIUM 1000 UNITS: 1000 INJECTION INTRAVENOUS; SUBCUTANEOUS at 09:46

## 2020-11-16 RX ADMIN — CARVEDILOL 25 MG: 25 TABLET, FILM COATED ORAL at 17:19

## 2020-11-16 RX ADMIN — SEVELAMER CARBONATE 1600 MG: 800 TABLET, FILM COATED ORAL at 17:19

## 2020-11-16 RX ADMIN — PROPOFOL 120 MG: 10 INJECTION, EMULSION INTRAVENOUS at 07:43

## 2020-11-16 ASSESSMENT — ACTIVITIES OF DAILY LIVING (ADL)
WALKING_OR_CLIMBING_STAIRS: OTHER (SEE COMMENTS)
ADLS_ACUITY_SCORE: 16
WALKING_OR_CLIMBING_STAIRS_DIFFICULTY: YES
EQUIPMENT_CURRENTLY_USED_AT_HOME: WHEELCHAIR, MANUAL
CONCENTRATING,_REMEMBERING_OR_MAKING_DECISIONS_DIFFICULTY: YES
DIFFICULTY_COMMUNICATING: NO
DRESSING/BATHING_DIFFICULTY: YES
DRESSING/BATHING: DRESSING DIFFICULTY, DEPENDENT
VISION_MANAGEMENT: GLASSES
FALL_HISTORY_WITHIN_LAST_SIX_MONTHS: NO
TOILETING_ISSUES: OTHER (SEE COMMENTS)
WEAR_GLASSES_OR_BLIND: YES
TOILETING_ASSISTANCE: TOILETING DIFFICULTY, REQUIRES EQUIPMENT
DOING_ERRANDS_INDEPENDENTLY_DIFFICULTY: YES
DIFFICULTY_EATING/SWALLOWING: NO
ADLS_ACUITY_SCORE: 16

## 2020-11-16 NOTE — ANESTHESIA CARE TRANSFER NOTE
Patient: Supriya Herr    Procedure(s):  CREATION, ARTERIOVENOUS FISTULA, UPPER EXTREMITY WITH INTRAOPERATIVE ULTRASOUND    Diagnosis: ESRD on dialysis (H) [N18.6, Z99.2]  Encounter regarding vascular access for dialysis for end-stage renal disease (H) [N18.6, Z99.2]  Diagnosis Additional Information: No value filed.    Anesthesia Type:   General, Peripheral Nerve Block     Note:  Airway :Face Mask  Patient transferred to:PACU  Comments: Airway :Face Mask  Patient transferred to:PACU  Comments: Prior to extubation, patient was reversed with 200 mg of Sugammadex and shortly afterwards observed with spontaneous breathing with regular pattern and proper tidal volumes. Patient woke up, opened her eyes to verbal stimuli and followed basic commands. Patient was suctioned and  extubated without complication.   Transported to PACU on 6L O2 via face mask.   VSS upon arrival to PACU.  Patient denies nausea or pain at this time.   Care transfer plan communicated and patient care transferred to PACU RN       Tyler Givens MD  Handoff Report: Identifed the Patient, Identified the Reponsible Provider, Reviewed the pertinent medical history, Discussed the surgical course, Reviewed Intra-OP anesthesia mangement and issues during anesthesia, Set expectations for post-procedure period and Allowed opportunity for questions and acknowledgement of understanding      Vitals: (Last set prior to Anesthesia Care Transfer)    CRNA VITALS  11/16/2020 1040 - 11/16/2020 1119      11/16/2020             Pulse:  69    Ht Rate:  69    SpO2:  100 %    Resp Rate (observed):  (!) 1                Electronically Signed By: Tyler Gievns MD  November 16, 2020  11:19 AM

## 2020-11-16 NOTE — PROGRESS NOTES
Post Op Check    11/16/2020    Pt reports some numbness and limited movement of R fingers. Movement in fingers is improving post peripheral nerve block. Pain well controlled. Complains of dry throat that is better with grapes and water sips. Denies n/v or SOB.    Temp:  [98  F (36.7  C)-98.7  F (37.1  C)] 98.7  F (37.1  C)  Pulse:  [61-72] 72  Resp:  [14-18] 18  BP: (112-161)/() 112/42  SpO2:  [97 %-100 %] 97 %    Gen: NAD, resting comfortably  CV: regular rate  Resp: nonlabored respirations on room air  Abd: soft, nontender  Ext: WWP, R AV fistula incision clean and dry with dermabond intact, some bruising around incision site, palpable thrill present. L graft fistula with palpable thrill  2+ bilateral radial pulses  Some movement of R fingers, sensation intact.       A/P: Supriya Herr is a 70 year old female h/o renal failure on permanent dialysis POD#0 s/p AV fistula creation of R brachial artery to cephalic vein.    No acute post-op issues. Continue plan of care per primary team. Please call with any questions.    Carla Gayle  General Surgery Resident, PGY-1

## 2020-11-16 NOTE — PROGRESS NOTES
Admitted/transferred from:   Time of arrival on unit 1300  2 RN full  skin assessment completed by Alfreda Alvarado & Amy Montanez.  Skin assessment finding: issues found - Pt has left AKA, R heel intact, large reddened rash under pannus, around belly button and in groin, right elbow, uses ointment at home, right   Interventions/actions: skin interventions Will try to keep dry, pt plans to dc tomorrow to her normal skin care routine     Will continue to monitor.

## 2020-11-16 NOTE — ANESTHESIA PROCEDURE NOTES
Peripheral Nerve Block Procedure Note      Staff -   Anesthesiologist:  Isaias Durant MD  Resident/Fellow: Chloé Merrill MD  Performed By: resident  Location: Pre-op  Procedure Start/Stop TImes:      11/16/2020 7:05 AM     11/16/2020 7:15 AM    patient identified, IV checked, site marked, risks and benefits discussed, informed consent, monitors and equipment checked, pre-op evaluation, at physician/surgeon's request and post-op pain management      Correct Patient: Yes      Correct Position: Yes      Correct Site: Yes      Correct Procedure: Yes      Correct Laterality:  Yes    Site Marked:  Yes  Procedure details:     Procedure:  Supraclavicular    ASA:  3    Diagnosis:  Postoperative pain relief    Laterality:  Right    Sterile Prep: chloraprep, mask and sterile gloves      Local skin infiltration:  None    Needle:  Insulated    Needle gauge:  21    Needle length (mm):  110    Ultrasound: Yes      Ultrasound used to identify targeted nerve, plexus, or vascular structure and placed a needle adjacent to it      Permanent Image entered into patiient's record      Abnormal pain on injection: No      Blood Aspirated: No      Paresthesias:  No    Bleeding at site: No      Bolus via:  Needle    Infusion Method:  Single Shot    Complications:  None  Assessment/Narrative:     Injection made incrementally with aspirations every (mL):  5

## 2020-11-16 NOTE — ANESTHESIA PROCEDURE NOTES
Airway   Date/Time: 11/16/2020 7:45 AM   Patient location during procedure: OR    Staff -   Anesthesiologist:  Destin Gandara MD  Resident/Fellow: Tyler Givens MD  Performed By: resident    Consent for Airway   Urgency: elective    Indications and Patient Condition  Indications for airway management: maciel-procedural  Induction type:intravenousMask difficulty assessment: 1 - vent by mask    Final Airway Details  Final airway type: endotracheal airway  Successful airway:ETT - single  Endotracheal Airway Details   ETT size (mm): 7.5  Cuffed: yes  Successful intubation technique: direct laryngoscopy  Grade View of Cords: 1  Adjucts: stylet  Measured from: gums/teeth  Secured at (cm): 22  Secured with: silk tape  Bite block used: None    Post intubation assessment   Placement verified by: capnometry, equal breath sounds and chest rise   Number of attempts at approach: 1  Secured with:silk tape  Ease of procedure: easy  Dentition: Intact and Unchanged

## 2020-11-16 NOTE — OR NURSING
MD Tinajero paged at 1145: PACU 18- Herr, J: Daughter Caroline spoke to MD Banks and said patient was being admitted. Is patient being admitted or discharged? Thanks! Vivi REN RN [id number] or [phone]    MD Tinajero to bedside, patient will be admitted. Daughter updated.

## 2020-11-16 NOTE — PLAN OF CARE
[id number] - Supriya transferred up to 7A from PACU around 1300 s/p AV fistula placement. VSS on room air. Pt is alert and oriented. Denies pain or nausea. On clear liquid diet. Site on right arm is c/d/I w dermabond. Pt has functioning AT fistula on ULE. Both fistulas have +bruit & thrill. Left leg has AKA, right leg in tact. Pt has long history of fungal rash in her groin and under her pannus and beyond. (See skin check note). Pt failed discharge d/t mobility with block on right arm and inability to use arm for transfers. Pt has her personal wheelchair and glasses, phone and clothes at the bedside. Plan to discharge tomorrow. Will continue to monitor and notify team w concerns.

## 2020-11-16 NOTE — ANESTHESIA POSTPROCEDURE EVALUATION
Anesthesia POST Procedure Evaluation    Patient: Supriya Herr   MRN:     [id number] Gender:   female   Age:    70 year old :      1950        Preoperative Diagnosis: ESRD on dialysis (H) [N18.6, Z99.2]  Encounter regarding vascular access for dialysis for end-stage renal disease (H) [N18.6, Z99.2]   Procedure(s):  CREATION, ARTERIOVENOUS FISTULA, UPPER EXTREMITY WITH INTRAOPERATIVE ULTRASOUND   Postop Comments: No value filed.     Anesthesia Type: General, Peripheral Nerve Block       Disposition: Outpatient   Postop Pain Control: Uneventful            Sign Out: Well controlled pain   PONV: No   Neuro/Psych: Uneventful            Sign Out: Acceptable/Baseline neuro status   Airway/Respiratory: Uneventful            Sign Out: Acceptable/Baseline resp. status   CV/Hemodynamics: Uneventful            Sign Out: Acceptable CV status   Other NRE: NONE   DID A NON-ROUTINE EVENT OCCUR? No         Last Anesthesia Record Vitals:  CRNA VITALS  2020 1040 - 2020 1140      2020             Pulse:  69    Ht Rate:  69    SpO2:  100 %    Resp Rate (observed):  (!) 1          Last PACU Vitals:  Vitals Value Taken Time   /88 20 1220   Temp 36.7  C (98  F) 20 1200   Pulse 71 20 1224   Resp 18 20 1200   SpO2 98 % 20 1224   Temp src     NIBP     Pulse     SpO2     Resp     Temp     Ht Rate     Temp 2     Vitals shown include unvalidated device data.      Electronically Signed By: Destin Gandara MD, 2020, 12:25 PM

## 2020-11-16 NOTE — OP NOTE
Transplant Surgery  Operative Note  PREOP DIAGNOSIS: End Stage Renal Failure   POSTOP DIAGNOSIS: Same  OPERATION: Arteriovenous Fistula creation, right brachial artery to cephalic vein. Intraoperative ultrasound  FACULTY: Kennedy Banks MD  FELLOW: Samuel Tinajero MD- Fellow   ASSISTANT: Angelita Martin MD Co-surgeon  ANESTHESIA: General and Scalene Block  EBL: 10 ml.  SPECIMEN: none      INDICATION: The patient was referred by Dr. Basilio for permanent dialysis access creation due to renal failure. She has a previously placed brachial axillary left arm AV graft that is functioning but has been complicated by recurrent venous outflow stenosis, with concern that it may not maintain patency long-term. The indications, benefits, and risks of permanent vascular access creation were discussed, questions answered and informed consent was provided.   FINDINGS:   Artery quality was: Normal, with diameter of 6mm.  Anastomosis diameter: 7 mm.  Vein quality was: Normal with diameter of 4mm.   Blood flow was 1.1 L/min as measured by Transonic flow probe.  COMPLICATIONS: None.    PROCEDURE: The patient was positioned supine, and the right upper extremity was positioned, prepped and draped in the usual sterile fashion. An ultrasound was performed to assess the size, quality, and position of the artery and vein. The patient received preoperative IV antibiotics. An incision was made and extended through the subcutaneous tissues above the antecubital crease. The brachial artery and cephalic vein were circumferentially dissected. The patient was heparinized. Arterial clamps were placed and arteriotomy was made.  The distal aspect of the vein was then transected, the proximal vein dilated with heparinized saline and secured with a Heifitz clip. The vein was tailored and anastomosed with 7-0 Prolene. The clamps were removed sequentially. Hemostasis was obtained.  Fistula flow was excellent. The distal radial artery and ulnar  artery pulses were excellent and capillary refill excellent. The wound was closed in layers with absorbable suture and dressed with Dermabond. Counts were correct. Faculty was present for the key portions of the procedure. The patient was then awakened and transferred to PACU in good condition.

## 2020-11-16 NOTE — PROGRESS NOTES
0730 Patient received Block Prior to surgery; Consent was obtained, Patient was connected to continuous cardiac/BP/SpO2 monitoring for Block procedure - Timeout was completed at 0705 prior to Block; Patient received 50mcg Fentanyl via IVP; Block was completed at 0715; Patient tolerated procedure well; will continue to monitor.

## 2020-11-17 LAB
GLUCOSE BLDC GLUCOMTR-MCNC: 106 MG/DL (ref 70–99)
GLUCOSE BLDC GLUCOMTR-MCNC: 109 MG/DL (ref 70–99)
GLUCOSE BLDC GLUCOMTR-MCNC: 163 MG/DL (ref 70–99)
GLUCOSE BLDC GLUCOMTR-MCNC: 231 MG/DL (ref 70–99)

## 2020-11-17 PROCEDURE — G0378 HOSPITAL OBSERVATION PER HR: HCPCS

## 2020-11-17 PROCEDURE — G0257 UNSCHED DIALYSIS ESRD PT HOS: HCPCS

## 2020-11-17 PROCEDURE — 999N001017 HC STATISTIC GLUCOSE BY METER IP

## 2020-11-17 PROCEDURE — 250N000013 HC RX MED GY IP 250 OP 250 PS 637: Performed by: PHYSICIAN ASSISTANT

## 2020-11-17 PROCEDURE — 90937 HEMODIALYSIS REPEATED EVAL: CPT

## 2020-11-17 PROCEDURE — 258N000003 HC RX IP 258 OP 636: Performed by: SURGERY

## 2020-11-17 PROCEDURE — 250N000009 HC RX 250: Performed by: SURGERY

## 2020-11-17 PROCEDURE — 99223 1ST HOSP IP/OBS HIGH 75: CPT | Performed by: PHYSICIAN ASSISTANT

## 2020-11-17 RX ADMIN — CARVEDILOL 25 MG: 25 TABLET, FILM COATED ORAL at 08:44

## 2020-11-17 RX ADMIN — SODIUM CHLORIDE 250 ML: 9 INJECTION, SOLUTION INTRAVENOUS at 12:22

## 2020-11-17 RX ADMIN — AMLODIPINE BESYLATE 5 MG: 5 TABLET ORAL at 20:11

## 2020-11-17 RX ADMIN — Medication 50 MCG: at 21:52

## 2020-11-17 RX ADMIN — SEVELAMER CARBONATE 1600 MG: 800 TABLET, FILM COATED ORAL at 08:44

## 2020-11-17 RX ADMIN — ATORVASTATIN CALCIUM 20 MG: 20 TABLET, FILM COATED ORAL at 08:45

## 2020-11-17 RX ADMIN — SODIUM CHLORIDE 300 ML: 9 INJECTION, SOLUTION INTRAVENOUS at 12:21

## 2020-11-17 RX ADMIN — LIDOCAINE HYDROCHLORIDE 0.5 ML: 10 INJECTION, SOLUTION EPIDURAL; INFILTRATION; INTRACAUDAL; PERINEURAL at 12:21

## 2020-11-17 RX ADMIN — LIDOCAINE HYDROCHLORIDE 0.5 ML: 10 INJECTION, SOLUTION EPIDURAL; INFILTRATION; INTRACAUDAL; PERINEURAL at 12:20

## 2020-11-17 RX ADMIN — AMLODIPINE BESYLATE 5 MG: 5 TABLET ORAL at 08:45

## 2020-11-17 RX ADMIN — Medication: at 12:22

## 2020-11-17 RX ADMIN — SEVELAMER CARBONATE 1600 MG: 800 TABLET, FILM COATED ORAL at 16:53

## 2020-11-17 RX ADMIN — CARVEDILOL 25 MG: 25 TABLET, FILM COATED ORAL at 17:56

## 2020-11-17 RX ADMIN — SERTRALINE HYDROCHLORIDE 50 MG: 50 TABLET ORAL at 21:52

## 2020-11-17 RX ADMIN — INSULIN GLARGINE 14 UNITS: 100 INJECTION, SOLUTION SUBCUTANEOUS at 21:56

## 2020-11-17 ASSESSMENT — ACTIVITIES OF DAILY LIVING (ADL)
ADLS_ACUITY_SCORE: 16

## 2020-11-17 NOTE — PLAN OF CARE
/61 (BP Location: Right leg)   Pulse 70   Temp 98.3  F (36.8  C) (Oral)   Resp 18   SpO2 98%  AVSS on RA. Patient denies pain. Patient denies nausea. . Urine Output - has not voided yet this shift. Bowel Function - has not had a BM this shift. Slept fair. Pt c/o numbness in her right arm where her newly placed fistula is. 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 She stated that she was unable to move her arm yesterday evening/night. At about 0200 she noticed a significant increase in her ability to move her right arm. The plan is for her to have dialysis and then to discharge.

## 2020-11-17 NOTE — UTILIZATION REVIEW
"  Admission Status; Secondary Review Determination         Under the authority of the Utilization Management Committee, the utilization review process indicated a secondary review on the above patient.  The review outcome is based on review of the medical records, discussions with staff, and applying clinical experience noted on the date of the review.          (x) Observation Status Appropriate - This patient does not meet hospital inpatient criteria and is placed in observation status. If this patient's primary payer is Medicare and was admitted as an inpatient, Condition Code 44 should be used and patient status changed to \"observation\".     RATIONALE FOR DETERMINATION   70 year old female h/o renal failure on permanent dialysis POD#0 s/p AV fistula creation of R brachial artery to cephalic vein.  Expected stay less than 2 midnights at the time of admission in a Medicare patient, plan is to dialyze on discharge today according to the chart. The severity of illness, intensity of service provided, expected LOS and risk for adverse outcome make the care appropriate for further observation; however, doesn't meet criteria for hospital inpatient admission. DUDLEY VALVERDE PA notified of this determination.    This document was produced using voice recognition software.      The information on this document is developed by the utilization review team in order for the business office to ensure compliance.  This only denotes the appropriateness of proper admission status and does not reflect the quality of care rendered.         The definitions of Inpatient Status and Observation Status used in making the determination above are those provided in the CMS Coverage Manual, Chapter 1 and Chapter 6, section 70.4.      Sincerely,     MARICARMEN LOYOLA MD    System Medical Director  Utilization Management  [facility].      "

## 2020-11-17 NOTE — PROGRESS NOTES
Care Management Discharge Note    Discharge Date: 11/17/20       Discharge Disposition:      Discharge Services:      Discharge DME:      Discharge Transportation:  Daughter Caroline Gray will pick her up    Private pay costs discussed: Not applicable    PAS Confirmation Code:    Patient/family educated on Medicare website which has current facility and service quality ratings:  Pt was made Inpatient to observation status--I explained the MOON letter and she asked me to sign it for her--copy with her and in the chart.    Education Provided on the Discharge Plan:  resume OP HD  Persons Notified of Discharge Plans: Pt  Patient/Family in Agreement with the Plan:  Yes    Handoff Referral Completed: Yes    Additional Information:          Jackie Cohen RN

## 2020-11-17 NOTE — PROGRESS NOTES
BP 96/52 (BP Location: Right leg)   Pulse 75   Temp 98.9  F (37.2  C) (Oral)   Resp 18   SpO2 99%   Neuro: A&Ox4. Numbness to left hand( fistula)  Cardiac: Afebrile, VSS.   Respiratory: RA   GI/: Voiding spontaneously. No BM this shift.   Diet/appetite: Tolerating diet. Denies nausea   Activity: bedrest.   Pain: Denies   Skin: No new deficits noted.  Lines: PIV, new AV fistula.  Drains:None  No new complaints.Will monitor and follow plan of care.
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
## 2020-11-17 NOTE — PLAN OF CARE
VS: stable  Neuro: A&O  Mobility: up assist x2; wheelchair user; feeling weak/pain in right arm, Left BKA  Discomfort: right arm pain; receiving tylenol  LDAs: neck PIV and thigh PIV intact  Labs: none ordered this AM  Glu: achs; 109  : oliguric, refused lasix but agreed to dialyze   GI: no BM this shift  Plan:   plans to discharge after dialysis

## 2020-11-17 NOTE — DISCHARGE SUMMARY
[facility]     Discharge Summary  Solid Organ Transplant    Date of Admission:  11/16/2020  Date of Discharge:  11/18/20  Discharging Provider: Usha Byers NP    Discharge Diagnoses   Active Problems:    ESRD on dialysis (H)    Encounter regarding vascular access for dialysis for end-stage renal disease (H)    Encounter for adjustment and management of vascular access device      Discharge Disposition   Discharged to home  Condition at discharge: Stable      Hospital Course   Supriya eHrr was admitted on 11/16/2020.  The following problems were addressed during her hospitalization:    ESRD on HD, s/p creation of Right UE Arteriovenous fistula 11/16:  She has a previously placed brachial axillary left arm AV graft that is functioning but has been complicated by recurrent venous outflow stenosis, with concern that it may not maintain patency long-term. She underwent a right brachial artery to cephalic vein Arteriovenous Fistula creation with Dr. Banks on 11/16/20.  Postoperatively she had continued numbness and limited mobility due to the peripheral nerve block. On POD #2 she had improved mobility to RUE and hand along with adequate pain control. Therefore she underwent hemodialysis per her home routine. PT saw and evaluated her on POD #2 and cleared her for safe discharge home.     Acute postoperative pain: Patient reported pain controlled on tylenol PRN and will continue this at discharge. She declined a prescription for any narcotics.    Consultations This Hospital Stay   NEPHROLOGY GENERAL ADULT IP CONSULT  PHYSICAL THERAPY ADULT IP CONSULT    Code Status   Full Code    Time Spent on this Encounter   I, Usha Byers NP, personally saw the patient today and spent greater than 30 minutes discharging this patient.       Usha Byers NP  [facility]   ______________________________________________________________________    Physical Exam   Temp: 98.6  F (37  C) Temp src: Oral BP: 115/54 Pulse: 68   Resp: 20 SpO2: 99 % O2 Device: None (Room air)    There were no vitals filed for this visit.  Vital Signs with Ranges  Temp:  [97.5  F (36.4  C)-98.6  F (37  C)] 98.6  F (37  C)  Pulse:  [54-76] 68  Resp:  [15-30] 20  BP: (107-157)/() 115/54  SpO2:  [98 %-100 %] 99 %  No intake/output data recorded.      Gen: NAD, resting comfortably  CV: regular rate and rhythm  Resp: nonlabored respirations on room air  Abd: soft, nontender  Ext: RUE: warm, R AV fistula incision clean and dry with dermabond intact, some bruising around incision site, palpable thrill present. Able to move all 5 digits, wrist, and elbow of Right arm. Sensation intact.  L graft fistula with palpable thrill  2+ bilateral radial pulses         Primary Care Physician   Noam Jackson    Discharge Orders      Weight bearing status - As tolerated     No driving or operating machinery     until the day after procedure     No Alcohol    For 24 hours post procedure     Diet Instructions    Resume pre-procedure diet     Shower    No shower for 48 hours post procedure. May shower Postoperative Day (POD)  2     Reason for your hospital stay    ESRD on dialysis   Encounter regarding vascular access for dialysis for end-stage renal disease     When to contact your care team    WHEN TO CONTACT YOUR  COORDINATOR:  Transplant Coordinator [phone]  Notify your coordinator if you have worsening pain to your right arm,  Cool or worsening numbness to right upper extremity, increased redness or drainage from your incision, fever greater than 101.5F.   Notify your coordinator immediately if you are ever unable to feel your fistula.   If it is outside of office hours, please call the hospital switchboard at [phone] and ask to have the kidney transplant/vascular access surgery fellow paged for  urgent medical questions, or present to the emergency department.     Activity    Do the forearm exercises with a stress ball.  You should squeeze and release the ball rapidly for 10 minutes 6 times per day.  This will help the fistula mature quicker     Adult [facility] Follow-up and recommended labs and tests    Resume all prior to admission medications  Follow up with Dr. Kennedy Banks, vascular access clinic in 2 weeks for postoperative evaluation.    Appointments on [place] and/or [facility] (with [facility] or [facility] provider or service). Call [phone] if you haven't heard regarding these appointments within 7 days of discharge.       Significant Results and Procedures   Results for orders placed or performed during the hospital encounter of 11/16/20   POC US GUIDANCE NEEDLE PLACEMENT    Impression    R Supraclavicular block     *Note: Due to a large number of results and/or encounters for the requested time period, some results have not been displayed. A complete set of results can be found in Results Review.       Discharge Medications   Current Discharge Medication List      CONTINUE these medications which have CHANGED    Details   acetaminophen (TYLENOL) 325 MG tablet Take 2 tablets (650 mg) by mouth every 4 hours as needed for mild pain  Qty: 50 tablet, Refills: 0    Associated Diagnoses: ESRD on dialysis (H)         CONTINUE these medications which have NOT CHANGED    Details   albuterol (PROAIR HFA/PROVENTIL HFA/VENTOLIN HFA) 108 (90 Base) MCG/ACT inhaler Inhale 2 puffs into the lungs every 6 hours as needed for shortness of breath / dyspnea or wheezing  Qty: 3 Inhaler, Refills: 1    Comments: Pharmacy may dispense brand covered by insurance (Proair, or proventil or ventolin or generic albuterol inhaler)  Associated Diagnoses: Cough      amLODIPine (NORVASC) 5 MG tablet Take 1 tablet (5 mg) by mouth 2 times daily  Qty: 180 tablet, Refills: 3    Associated Diagnoses: Hypertension goal BP (blood  pressure) < 140/90      apremilast (OTEZLA) 30 MG tablet Take 1 tablet (30 mg) by mouth daily  Qty: 90 tablet, Refills: 3    Associated Diagnoses: Inverse psoriasis      atorvastatin (LIPITOR) 20 MG tablet Take 20 mg by mouth daily      blood glucose (ONETOUCH ULTRA) test strip TEST YOUR BLOOD SUGAR 3-4 TIMES PER DAY.  Qty: 400 strip, Refills: 1    Associated Diagnoses: Type 2 diabetes mellitus with diabetic nephropathy, with long-term current use of insulin (H)      carvedilol (COREG) 25 MG tablet Take 1 tablet (25 mg) by mouth 2 times daily (with meals)  Qty: 180 tablet, Refills: 3    Associated Diagnoses: Essential hypertension      Epoetin Martínez (EPOGEN IJ) Inject 800 Units into the vein three times a week tues thurs sat at dialysis      furosemide (LASIX) 80 MG tablet Take 1 tablet (80 mg) by mouth 2 times daily  Qty: 180 tablet, Refills: 3    Associated Diagnoses: CKD (chronic kidney disease) stage 5, GFR less than 15 ml/min (H); Anemia due to stage 5 chronic kidney disease, 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 (H)      insulin aspart (NOVOLOG FLEXPEN) 100 UNIT/ML pen INJECT 4 UNITS SUBCUTANEOUSLY WITH BREAKFAST, LUNCH AND DINNER.  Qty: 15 mL, Refills: 3    Associated Diagnoses: Type 2 diabetes mellitus with hyperglycemia, with long-term current use of insulin (H)      insulin glargine (BASAGLAR KWIKPEN) 100 UNIT/ML pen Inject 18 Units Subcutaneous At Bedtime Pt to take 14 units if pt is NPO for surgery  Qty: 2 mL, Refills: 3    Comments: If Basaglar is not covered by insurance, may substitute Lantus at same dose and frequency.    Associated Diagnoses: Type 2 diabetes mellitus with diabetic neuropathy, with long-term current use of insulin (H)      insulin pen needle (ULTICARE MINI) 31G X 6 MM Use daily or as directed.  Qty: 100 each, Refills: 1    Associated Diagnoses: Type 2 diabetes, HbA1c goal < 7% (H)      Iron Sucrose (VENOFER IV) Inject 50 mg into the vein twice a week At dialysis session (tues/Sat)      !! order for  "DME Equipment being ordered: mattress overlay for hospital bed  Wt. 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 order for DME 1 wheelchair  Qty: 1 Device, Refills: 0    Associated Diagnoses: Traumatic amputation of lower extremity above knee, unspecified laterality, subsequent encounter; CKD (chronic kidney disease) stage 3, GFR 30-59 ml/min; Type 2 diabetes mellitus with stage 3 chronic kidney disease, without long-term current use of insulin (H)      Probiotic Product (PROBIOTIC PO) Take 1 capsule by mouth daily      !! RENVELA 800 MG tablet Take 1,600 mg by mouth 3 times daily (with meals)       sertraline (ZOLOFT) 25 MG tablet Take 50 mg by mouth At Bedtime      !! sevelamer carbonate (RENVELA) 800 MG tablet Take 800 mg by mouth Take with snacks or supplements      vitamin D3 (CHOLECALCIFEROL) 2000 units (50 mcg) tablet Take 1 tablet (2,000 Units) by mouth daily  Qty: 100 tablet, Refills: 3    Associated Diagnoses: Vitamin D deficiency      desonide (DESOWEN) 0.05 % external ointment Apply topically as needed       !! - Potential duplicate medications found. Please discuss with provider.        Allergies   Allergies   Allergen Reactions     Penicillins Rash     Unasyn Rash     No evidence SJS, but very uncomfortable and precipitated multiple provider visits. Would not use penicillins again if other options available.        "

## 2020-11-18 VITALS
HEART RATE: 68 BPM | RESPIRATION RATE: 20 BRPM | OXYGEN SATURATION: 99 % | TEMPERATURE: 98.6 F | SYSTOLIC BLOOD PRESSURE: 115 MMHG | DIASTOLIC BLOOD PRESSURE: 54 MMHG

## 2020-11-18 LAB
ANION GAP SERPL CALCULATED.3IONS-SCNC: 5 MMOL/L (ref 3–14)
BUN SERPL-MCNC: 44 MG/DL (ref 7–30)
CALCIUM SERPL-MCNC: 9.1 MG/DL (ref 8.5–10.1)
CHLORIDE SERPL-SCNC: 106 MMOL/L (ref 94–109)
CO2 SERPL-SCNC: 29 MMOL/L (ref 20–32)
CREAT SERPL-MCNC: 4.23 MG/DL (ref 0.52–1.04)
GFR SERPL CREATININE-BSD FRML MDRD: 10 ML/MIN/{1.73_M2}
GLUCOSE BLDC GLUCOMTR-MCNC: 120 MG/DL (ref 70–99)
GLUCOSE BLDC GLUCOMTR-MCNC: 148 MG/DL (ref 70–99)
GLUCOSE BLDC GLUCOMTR-MCNC: 252 MG/DL (ref 70–99)
GLUCOSE BLDC GLUCOMTR-MCNC: 390 MG/DL (ref 70–99)
GLUCOSE SERPL-MCNC: 161 MG/DL (ref 70–99)
MAGNESIUM SERPL-MCNC: 2.1 MG/DL (ref 1.6–2.3)
PHOSPHATE SERPL-MCNC: 4.2 MG/DL (ref 2.5–4.5)
POTASSIUM SERPL-SCNC: 4.7 MMOL/L (ref 3.4–5.3)
SODIUM SERPL-SCNC: 140 MMOL/L (ref 133–144)

## 2020-11-18 PROCEDURE — 999N001017 HC STATISTIC GLUCOSE BY METER IP

## 2020-11-18 PROCEDURE — 80048 BASIC METABOLIC PNL TOTAL CA: CPT | Performed by: INTERNAL MEDICINE

## 2020-11-18 PROCEDURE — G0378 HOSPITAL OBSERVATION PER HR: HCPCS

## 2020-11-18 PROCEDURE — 250N000012 HC RX MED GY IP 250 OP 636 PS 637: Performed by: PHYSICIAN ASSISTANT

## 2020-11-18 PROCEDURE — 36415 COLL VENOUS BLD VENIPUNCTURE: CPT | Performed by: INTERNAL MEDICINE

## 2020-11-18 PROCEDURE — 250N000013 HC RX MED GY IP 250 OP 250 PS 637: Performed by: PHYSICIAN ASSISTANT

## 2020-11-18 PROCEDURE — 999N000147 HC STATISTIC PT IP EVAL DEFER

## 2020-11-18 PROCEDURE — 96372 THER/PROPH/DIAG INJ SC/IM: CPT | Performed by: PHYSICIAN ASSISTANT

## 2020-11-18 PROCEDURE — 250N000013 HC RX MED GY IP 250 OP 250 PS 637: Mod: GY | Performed by: PHYSICIAN ASSISTANT

## 2020-11-18 PROCEDURE — 83735 ASSAY OF MAGNESIUM: CPT | Performed by: INTERNAL MEDICINE

## 2020-11-18 PROCEDURE — 84100 ASSAY OF PHOSPHORUS: CPT | Performed by: INTERNAL MEDICINE

## 2020-11-18 RX ADMIN — ATORVASTATIN CALCIUM 20 MG: 20 TABLET, FILM COATED ORAL at 08:12

## 2020-11-18 RX ADMIN — SEVELAMER CARBONATE 1600 MG: 800 TABLET, FILM COATED ORAL at 08:12

## 2020-11-18 RX ADMIN — SEVELAMER CARBONATE 1600 MG: 800 TABLET, FILM COATED ORAL at 12:05

## 2020-11-18 RX ADMIN — AMLODIPINE BESYLATE 5 MG: 5 TABLET ORAL at 08:12

## 2020-11-18 RX ADMIN — CARVEDILOL 25 MG: 25 TABLET, FILM COATED ORAL at 08:12

## 2020-11-18 RX ADMIN — INSULIN ASPART 1 UNITS: 100 INJECTION, SOLUTION INTRAVENOUS; SUBCUTANEOUS at 12:10

## 2020-11-18 ASSESSMENT — PAIN DESCRIPTION - DESCRIPTORS: DESCRIPTORS: SORE

## 2020-11-18 NOTE — DISCHARGE SUMMARY
Dialysis Discharge Summary Brief    [facility]  Division of Nephrology  Nephrology Discharge Dialysis Orders  Ph: [phone]  Fax: [phone]    Supriya Herr  MRN: [id number]  YOB: 1950    [facility] Unit: [facility]  Primary Nephrologist: Dr. Basilio/Karen Lynn NP    Date of Admission: 11/16/2020  Date of Discharge: 11/18/2020     Please page me at  with any questions. Thanks much. Pat Jacinto PA-C    Supriya Herr is a 70 yr old female with PMH of DMII, ESRD, HTN, CHF, JONE, traumatic AKA, admitted s/p RUE AVF creation 11/16/20.     ESKD: due to DMII; dialyzes TTS at [facility] with Dr. Basilio. Access: LUE AVG. Run time: 3.5 hrs. EDW: 85.5 kg.   - Dialyzed Tues 11/17        Access:   - continue to use LUE AVG for now  - s/p RUE AVF creation 11/16/20     Volume: EDW 85.5 kg; usual UF 1.5 to 2.5 kg; O2 98% on RA        BP: elevated at baseline, lowest SBP while on dialysis is ~110. PTA amlodipine 5 mg qday, Coreg 25 mg bid, lasix 80 mg bid     Anemia: PTA on venofer 50 mg 2x/week, epogen 800 units per HD, recent hgb 10's     BMD: PTA sevelamer 1600 mg tid WM       Discharge Diagnosis:    ICD-10-CM    1. ESRD on dialysis (H)  N18.6 Create fistula arteriovenous upper extremity    Z99.2 Create fistula arteriovenous upper extremity     CBC with platelets differential     Creatinine     HOLD: Antiplatelets, NSAIDs and anticoagulants - pt may take routine po meds with 30 mL water AM of surgery     clindamycin (CLEOCIN) infusion 900 mg     Glucose     Potassium     INR     Partial thromboplastin time     ABO/Rh type and screen     Glucose by meter     Glucose by meter     Glucose by meter     Glucose by meter     acetaminophen (TYLENOL) 325 MG tablet     oxyCODONE (ROXICODONE) 5 MG tablet     senna-docusate (SENOKOT-S/PERICOLACE) 8.6-50 MG tablet     Glucose by meter     Glucose by meter     Glucose by meter     Glucose by meter      Glucose by meter     Glucose by meter     Glucose by meter     Glucose by meter     Glucose by meter     Glucose by meter     Glucose by meter     Glucose by meter     Glucose by meter     Glucose by meter     Basic metabolic panel     Magnesium     Phosphorus     Glucose by meter     Glucose by meter     Glucose by meter     Glucose by meter     Glucose by meter     Glucose by meter     CANCELED: Create Graft Arteriovenous Upper Extremity Bovine     CANCELED: Create Graft Arteriovenous Upper Extremity Bovine     CANCELED: Creatinine     CANCELED: Hemoglobin     CANCELED: Potassium    Added automatically from request for surgery [id number]   2. Encounter regarding vascular access for dialysis for end-stage renal disease (H)  N18.6 Create fistula arteriovenous upper extremity    Z99.2 Create fistula arteriovenous upper extremity     CANCELED: Create Graft Arteriovenous Upper Extremity Bovine     CANCELED: Create Graft Arteriovenous Upper Extremity Bovine    Added automatically from request for surgery [id number]   3. ESRD on dialysis (H)  N18.6 Create fistula arteriovenous upper extremity    Z99.2 Create fistula arteriovenous upper extremity     CBC with platelets differential     Creatinine     HOLD: Antiplatelets, NSAIDs and anticoagulants - pt may take routine po meds with 30 mL water AM of surgery     clindamycin (CLEOCIN) infusion 900 mg     Glucose     Potassium     INR     Partial thromboplastin time     ABO/Rh type and screen     Glucose by meter     Glucose by meter     Glucose by meter     Glucose by meter     acetaminophen (TYLENOL) 325 MG tablet     oxyCODONE (ROXICODONE) 5 MG tablet     senna-docusate (SENOKOT-S/PERICOLACE) 8.6-50 MG tablet     Glucose by meter     Glucose by meter     Glucose by meter     Glucose by meter     Glucose by meter     Glucose by meter     Glucose by meter     Glucose by meter     Glucose by meter     Glucose by meter     Glucose by meter     Glucose by meter     Glucose by meter      Glucose by meter     Basic metabolic panel     Magnesium     Phosphorus     Glucose by meter     Glucose by meter     Glucose by meter     Glucose by meter     Glucose by meter     Glucose by meter     CANCELED: Create Graft Arteriovenous Upper Extremity Bovine     CANCELED: Create Graft Arteriovenous Upper Extremity Bovine     CANCELED: Creatinine     CANCELED: Hemoglobin     CANCELED: Potassium   4. Encounter regarding vascular access for dialysis for end-stage renal disease (H)  N18.6 Create fistula arteriovenous upper extremity    Z99.2 Create fistula arteriovenous upper extremity     CANCELED: Create Graft Arteriovenous Upper Extremity Bovine     CANCELED: Create Graft Arteriovenous Upper Extremity Bovine   5. Encounter for adjustment and management of vascular access device  Z45.2 HOLD: Antiplatelets, NSAIDs and anticoagulants - pt may take routine po meds with 30 mL water AM of surgery     clindamycin (CLEOCIN) infusion 900 mg          [x] Resume all previous dialysis orders with exception as noted below    New Orders (if not applicable put NA):  Estimated Dry Weight No change   Dialysis Duration    Dialysis Access Continue to use LUE AVG for now   Antibiotics (dose per dialysis, end date)            Labs to be drawn at dialysis    Other major changes to dialysis prescription (e.g. Dialysate bath, heparin, blood flow rate, etc)      Medication changes (also fax the unit a copy of the discharge summary)              Name of physician completing this form: Pat Jacinto PA-C

## 2020-11-18 NOTE — PROVIDER NOTIFICATION
Provider Notification  MD notified both of pt's PIVs infiltrated, provider ordered okay for no IV access per patient's request.     MD notified pt's 0200 BG is 390, recheck was 252.

## 2020-11-18 NOTE — PLAN OF CARE
Afebrile, VSS on RA.  Alert and oriented x4.  Denies pain or nausea.  up assist x2; wheelchair user; feeling weak/pain in right arm, Left BKA.  Neck and thigh PIV intact, SL.  .  Tolerating regular diet.  Possible discharge tomorrow.  Continue plan of care.

## 2020-11-18 NOTE — PLAN OF CARE
BP (!) 140/60 (BP Location: Right leg)   Pulse 76   Temp 98  F (36.7  C) (Oral)   Resp 20   SpO2 98%    Shift: 5099-2735  VS: VSS on RA, afebrile.   Neuro: AOx4  B check 390, recheck 252 w/o intervention.   Respiratory: WDL  Pain/Nausea/PRN: C/o arthritic pain in R knee, menthol gel ordered.   Diet: Reg  LDA: No IV access, team aware.   GI/: No BM or void overnight. Pt refused scheduled Lasix d/t difficulty getting to the bathroom.   Skin: WDL  Mobility: A2  Plan: PT to consult today to determine safe discharge plan.     Will continue with POC, will notify MD with changes or concerns.

## 2020-11-18 NOTE — PLAN OF CARE
PT DEFER/7A: pt near prior level of function for transfers to/from w/c. Pt demos near 360 degree turn for transfer, therapist providing education on safety with pivot. However, pt shows no signs of difficulty reaching position, as this is how she's been doing so at home. Does not use slide board or walker for mobility. Pt does report pain with R UE 2/2 fissula, but pain is tolerable. Pt is educated on the importance of having assistance available for transfers until R UE pain resolves. Pt daughter and son in law live above her in a duplex, and can provide 24 hr assistance if needed. Recommend HH PT to progress strength and IND with transfers, pt is concerned about home therapy 2/2 COVID. Pt has safe plan in place, no further PT needs. PT orders will be completed at this time.

## 2020-11-18 NOTE — PROGRESS NOTES
Observation Progress Note:    Pt alert and oriented x4. VSS. Pt reports intermittent sore pain in RUE where new fistula is, pt declines meds or hot/cold therapy. R fistula WNL, site WDL. L graft site dressing CDI, also WNL. Pt with no IV access. Per report, provider OK with it since pt possibly discharging today. Appetite good, denies nausea. BG check this morning 120, no insulin coverage needed. Pt declined morning dose of lasix. Per pt, RUE hurting too much and lasix will cause her to constantly void. Which means she will have to use arm frequently causing more pain. PT/OT saw pt early morning. Per report, pt did well and has been cleared for discharge home per therapy. Writer will continue to monitor and assess pt with every encounter. Will await provider orders for discharge.

## 2020-11-18 NOTE — PLAN OF CARE
DISCHARGE   Discharged to: Home  Via: Automobile  Accompanied by: RN down to pharmacy then lobby where daughter is waiting  Discharge Instructions: diet, activity, medications, follow up appointments, when to call the MD, and what to watchout for (i.e. s/s of infection, increasing SOB, palpitations, chest pain,)  Prescriptions: To be filled by [facility] Discharge pharmacy per pt's request; medication list reviewed & sent with pt  Follow Up Appointments: arranged; information given  Belongings: All sent with pt  IV: out  Telemetry: off  Pt exhibits understanding of above discharge instructions; all questions answered.  Discharge Paperwork: faxed  _____    Pt able to transfer from bed to pt's home wheelchair by herself. Per provider, pt cleared for discharge. Discharge education done at the bedside with pt. All questions answered and addressed. Writer RN brought pt down via wheelchair to pharmacy then front lobby where daughter was waiting. All personal items packed and given to pt.

## 2020-11-19 ENCOUNTER — TELEPHONE (OUTPATIENT)
Dept: TRANSPLANT | Facility: CLINIC | Age: 70
End: 2020-11-19

## 2020-11-19 NOTE — TELEPHONE ENCOUNTER
Daughter called stated the patient is having a lot of pain in the arm where the fistula was placed Admin did valeria Fuller the NP.

## 2020-11-23 ENCOUNTER — OFFICE VISIT (OUTPATIENT)
Dept: TRANSPLANT | Facility: CLINIC | Age: 70
End: 2020-11-23
Attending: SURGERY
Payer: MEDICARE

## 2020-11-23 VITALS
SYSTOLIC BLOOD PRESSURE: 194 MMHG | OXYGEN SATURATION: 98 % | TEMPERATURE: 98 F | HEART RATE: 67 BPM | DIASTOLIC BLOOD PRESSURE: 94 MMHG

## 2020-11-23 DIAGNOSIS — Z99.2 ESRD ON DIALYSIS (H): Primary | ICD-10-CM

## 2020-11-23 DIAGNOSIS — N18.6 ESRD ON DIALYSIS (H): Primary | ICD-10-CM

## 2020-11-23 PROCEDURE — 99024 POSTOP FOLLOW-UP VISIT: CPT | Mod: 95 | Performed by: SURGERY

## 2020-11-23 PROCEDURE — G0463 HOSPITAL OUTPT CLINIC VISIT: HCPCS

## 2020-11-23 NOTE — NURSING NOTE
Chief Complaint   Patient presents with     RECHECK     F/U Fistula and Right hand     Blood pressure (!) 194/94, pulse 67, temperature 98  F (36.7  C), temperature source Oral, SpO2 98 %, not currently breastfeeding.    Gay Rodas, CMA
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
## 2020-11-25 DIAGNOSIS — T82.590A MALFUNCTION OF ARTERIOVENOUS GRAFT, INITIAL ENCOUNTER (H): ICD-10-CM

## 2020-11-25 DIAGNOSIS — N18.6 ESRD ON DIALYSIS (H): Primary | ICD-10-CM

## 2020-11-25 DIAGNOSIS — Z99.2 ESRD ON DIALYSIS (H): Primary | ICD-10-CM

## 2020-11-25 DIAGNOSIS — T82.898S OTHER SPECIFIED COMPLICATION OF VASCULAR PROSTHETIC DEVICES, IMPLANTS AND GRAFTS, SEQUELA: ICD-10-CM

## 2020-11-30 ENCOUNTER — OFFICE VISIT (OUTPATIENT)
Dept: TRANSPLANT | Facility: CLINIC | Age: 70
End: 2020-11-30
Attending: CLINICAL NURSE SPECIALIST
Payer: MEDICARE

## 2020-11-30 VITALS — HEART RATE: 66 BPM | OXYGEN SATURATION: 99 % | DIASTOLIC BLOOD PRESSURE: 80 MMHG | SYSTOLIC BLOOD PRESSURE: 194 MMHG

## 2020-11-30 DIAGNOSIS — T82.590A MALFUNCTION OF ARTERIOVENOUS GRAFT, INITIAL ENCOUNTER (H): Primary | ICD-10-CM

## 2020-11-30 PROCEDURE — 99024 POSTOP FOLLOW-UP VISIT: CPT | Mod: 95 | Performed by: SURGERY
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
## 2020-11-30 NOTE — NURSING NOTE
Chief Complaint   Patient presents with     RECHECK     2 Week f/u     Blood pressure (!) 194/80, pulse 66, SpO2 99 %, not currently breastfeeding.    Gay Rodas, CMA

## 2020-12-02 ENCOUNTER — ANCILLARY PROCEDURE (OUTPATIENT)
Dept: ULTRASOUND IMAGING | Facility: CLINIC | Age: 70
End: 2020-12-02
Attending: SURGERY
Payer: MEDICARE

## 2020-12-02 DIAGNOSIS — E11.22 TYPE 2 DIABETES MELLITUS WITH DIABETIC CHRONIC KIDNEY DISEASE, UNSPECIFIED CKD STAGE, UNSPECIFIED WHETHER LONG TERM INSULIN USE (H): Primary | ICD-10-CM

## 2020-12-02 DIAGNOSIS — N18.6 ESRD ON DIALYSIS (H): ICD-10-CM

## 2020-12-02 DIAGNOSIS — Z99.2 ESRD ON DIALYSIS (H): ICD-10-CM

## 2020-12-02 DIAGNOSIS — T82.590A MALFUNCTION OF ARTERIOVENOUS GRAFT, INITIAL ENCOUNTER (H): ICD-10-CM

## 2020-12-02 DIAGNOSIS — T82.898S OTHER SPECIFIED COMPLICATION OF VASCULAR PROSTHETIC DEVICES, IMPLANTS AND GRAFTS, SEQUELA: ICD-10-CM

## 2020-12-02 PROCEDURE — 93990 DOPPLER FLOW TESTING: CPT | Mod: GC | Performed by: RADIOLOGY

## 2020-12-02 RX ORDER — LANCING DEVICE/LANCETS
KIT MISCELLANEOUS
Qty: 1 EACH | Refills: 1 | Status: SHIPPED | OUTPATIENT
Start: 2020-12-02 | End: 2021-11-09

## 2020-12-10 DIAGNOSIS — M79.641 PAIN OF RIGHT HAND: Primary | ICD-10-CM

## 2020-12-10 RX ORDER — GABAPENTIN 100 MG/1
100 CAPSULE ORAL 3 TIMES DAILY PRN
Qty: 60 CAPSULE | Refills: 3 | Status: SHIPPED | OUTPATIENT
Start: 2020-12-10 | End: 2021-04-26

## 2020-12-14 ENCOUNTER — OFFICE VISIT (OUTPATIENT)
Dept: TRANSPLANT | Facility: CLINIC | Age: 70
End: 2020-12-14
Attending: SURGERY
Payer: MEDICARE

## 2020-12-14 VITALS — DIASTOLIC BLOOD PRESSURE: 95 MMHG | SYSTOLIC BLOOD PRESSURE: 188 MMHG | HEART RATE: 68 BPM | OXYGEN SATURATION: 100 %

## 2020-12-14 DIAGNOSIS — T82.590A MALFUNCTION OF ARTERIOVENOUS GRAFT, INITIAL ENCOUNTER (H): Primary | ICD-10-CM

## 2020-12-14 PROCEDURE — 99024 POSTOP FOLLOW-UP VISIT: CPT | Performed by: SURGERY

## 2020-12-14 NOTE — NURSING NOTE
Chief Complaint   Patient presents with     RECHECK     2 week f/u     Blood pressure (!) 188/95, pulse 68, SpO2 100 %, not currently breastfeeding.    Gay Rodas, CMA
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
## 2020-12-20 ENCOUNTER — MEDICAL CORRESPONDENCE (OUTPATIENT)
Dept: HEALTH INFORMATION MANAGEMENT | Facility: CLINIC | Age: 70
End: 2020-12-20

## 2021-01-11 ENCOUNTER — VIRTUAL VISIT (OUTPATIENT)
Dept: TRANSPLANT | Facility: CLINIC | Age: 71
End: 2021-01-11
Attending: SURGERY
Payer: MEDICARE

## 2021-01-11 DIAGNOSIS — T82.590A MALFUNCTION OF ARTERIOVENOUS GRAFT, INITIAL ENCOUNTER (H): Primary | ICD-10-CM

## 2021-01-11 PROCEDURE — 99207 PR SATISFY VISIT NUMBER: CPT | Mod: TEL | Performed by: SURGERY

## 2021-01-11 NOTE — LETTER
"1/11/2021      RE: Supriya Herr  [address]  [facility] [address]       Supriya is a 70 year old who is being evaluated via a billable telephone visit.      What phone number would you like to be contacted at? [phone]  How would you like to obtain your AVS? MyChart  [id number]}     BMI:   Estimated body mass index is 38.43 kg/m  as calculated from the following:    Height as of 11/4/20: 1.676 m (5' 6\").    Weight as of 11/4/20: 108 kg (238 lb 1.6 oz).   Weight management plan: Discussed healthy diet and exercise guidelines          No follow-ups on file.    Kennedy Banks MD  [facility]    Subjective     Supriya is a 70 year old who is assessed for follow-up of a right brachiocephalic AV fistula created two months ago. The initial flow from her fistula was very high, prompting concerns for steal. She initially did have coldness/numbness in her right hand with severe hand pain in both hands when her LEFT graft was accessed for dialysis. Her symptoms are difficult to characterize, however, given her long history of neuropathy in both hands and some arthritic pains.     She has had significant improvement in some of these symptoms over the past two months. She no longer has any hand pains or coldness during dialysis. Her left graft is still functioning well. She is able to tolerate the full run and does not need to come off early. She has some weakness and fumbling with her right hand, though this is slowly improving. She does state that her fingertips remain persistently cold and tingly, and that this feels somewhat different than her neuropathy from previously. She has just started taking gabapentin to attempt to relieve these symptoms. She is using the exercise ball and doing hand exercises to attempt to improve these symptoms. Her last duplex demonstrated antegrade flow into her hand past the fistula anastomosis without evidence of steal.    HPI       Review of Systems "   CONSTITUTIONAL: NEGATIVE for fever, chills, change in weight  INTEGUMENTARY/SKIN: NEGATIVE for worrisome rashes, moles or lesions  EYES: NEGATIVE for vision changes or irritation  ENT/MOUTH: NEGATIVE for ear, mouth and throat problems  RESP: NEGATIVE for significant cough or SOB  BREAST: NEGATIVE for masses, tenderness or discharge  CV: NEGATIVE for chest pain, palpitations or peripheral edema  GI: NEGATIVE for nausea, abdominal pain, heartburn, or change in bowel habits  : NEGATIVE for frequency, dysuria, or hematuria  MUSCULOSKELETAL: positive as above  NEURO: NEGATIVE for weakness, dizziness or paresthesias  ENDOCRINE: NEGATIVE for temperature intolerance, skin/hair changes  HEME: NEGATIVE for bleeding problems  PSYCHIATRIC: NEGATIVE for changes in mood or affect      Objective           Vitals:  No vitals were obtained today due to virtual visit.    Physical Exam   Deferred- telephone visit without video capability    No new results to review.      Assessment/Plan:    70 yo F s/p RUE AVF complicated by right hand coldness, numbness, and some pain/weakness. Picture confounded by extant neuropathy. Symptoms have overall improved, though she does feel some differences from prior to creation of fistula.     -I offered digital flow assessments to further characterize blood flow in her hand and see if she might benefit from banding or fistula revision. I am uncertain if this will be of benefit given the antegrade flow on her last fistula and have also offered repeat duplex. At this point, both the patient and her daughter feel that the risk and difficulty of additional surgery outweighs possible benefit and, as she has had some improvement and stability in these symptoms, they would defer both additional testing and further procedures.    -I advised them to discuss with her nephrologist what a safe amount of gabapentin would be for her and to increase her dose to a scheduled/baseline dose accordingly and see if  some of these symptoms improve, or if they remain persistent.    -We will revisit this in a few months to see how she is progressing. She is tolerating dialysis through her left graft at this point without issue. Her new fistula has matured well. It is possible she may have significant symptoms in using this fistula that would require further intervention- we should consider having her use her fistula now while her left graft is also available so that she would not require a line if revision is needed.         Phone call duration: 20 minutes  MD Kennedy Lance MD

## 2021-01-11 NOTE — PROGRESS NOTES
"Supriya is a 70 year old who is being evaluated via a billable telephone visit.      What phone number would you like to be contacted at? [phone]  How would you like to obtain your AVS? [facility]  [id number]}     BMI:   Estimated body mass index is 38.43 kg/m  as calculated from the following:    Height as of 11/4/20: 1.676 m (5' 6\").    Weight as of 11/4/20: 108 kg (238 lb 1.6 oz).   Weight management plan: Discussed healthy diet and exercise guidelines          No follow-ups on file.    Kennedy Banks MD  [facility]    Subjective     Supriya is a 70 year old who is assessed for follow-up of a right brachiocephalic AV fistula created two months ago. The initial flow from her fistula was very high, prompting concerns for steal. She initially did have coldness/numbness in her right hand with severe hand pain in both hands when her LEFT graft was accessed for dialysis. Her symptoms are difficult to characterize, however, given her long history of neuropathy in both hands and some arthritic pains.     She has had significant improvement in some of these symptoms over the past two months. She no longer has any hand pains or coldness during dialysis. Her left graft is still functioning well. She is able to tolerate the full run and does not need to come off early. She has some weakness and fumbling with her right hand, though this is slowly improving. She does state that her fingertips remain persistently cold and tingly, and that this feels somewhat different than her neuropathy from previously. She has just started taking gabapentin to attempt to relieve these symptoms. She is using the exercise ball and doing hand exercises to attempt to improve these symptoms. Her last duplex demonstrated antegrade flow into her hand past the fistula anastomosis without evidence of steal.    HPI       Review of Systems   CONSTITUTIONAL: NEGATIVE for fever, chills, change in weight  INTEGUMENTARY/SKIN: " NEGATIVE for worrisome rashes, moles or lesions  EYES: NEGATIVE for vision changes or irritation  ENT/MOUTH: NEGATIVE for ear, mouth and throat problems  RESP: NEGATIVE for significant cough or SOB  BREAST: NEGATIVE for masses, tenderness or discharge  CV: NEGATIVE for chest pain, palpitations or peripheral edema  GI: NEGATIVE for nausea, abdominal pain, heartburn, or change in bowel habits  : NEGATIVE for frequency, dysuria, or hematuria  MUSCULOSKELETAL: positive as above  NEURO: NEGATIVE for weakness, dizziness or paresthesias  ENDOCRINE: NEGATIVE for temperature intolerance, skin/hair changes  HEME: NEGATIVE for bleeding problems  PSYCHIATRIC: NEGATIVE for changes in mood or affect      Objective           Vitals:  No vitals were obtained today due to virtual visit.    Physical Exam   Deferred- telephone visit without video capability    No new results to review.      Assessment/Plan:    70 yo F s/p RUE AVF complicated by right hand coldness, numbness, and some pain/weakness. Picture confounded by extant neuropathy. Symptoms have overall improved, though she does feel some differences from prior to creation of fistula.     -I offered digital flow assessments to further characterize blood flow in her hand and see if she might benefit from banding or fistula revision. I am uncertain if this will be of benefit given the antegrade flow on her last fistula and have also offered repeat duplex. At this point, both the patient and her daughter feel that the risk and difficulty of additional surgery outweighs possible benefit and, as she has had some improvement and stability in these symptoms, they would defer both additional testing and further procedures.    -I advised them to discuss with her nephrologist what a safe amount of gabapentin would be for her and to increase her dose to a scheduled/baseline dose accordingly and see if some of these symptoms improve, or if they remain persistent.    -We will revisit this  in a few months to see how she is progressing. She is tolerating dialysis through her left graft at this point without issue. Her new fistula has matured well. It is possible she may have significant symptoms in using this fistula that would require further intervention- we should consider having her use her fistula now while her left graft is also available so that she would not require a line if revision is needed.         Phone call duration: 20 minutes  Kennedy Banks MD

## 2021-01-11 NOTE — LETTER
"    1/11/2021         RE: Supriya Herr  [address]  [facility] [address]        Dear Colleague,    Thank you for referring your patient, Supriya Herr, to the [facility]. Please see a copy of my visit note below.    Supriya is a 70 year old who is being evaluated via a billable telephone visit.      What phone number would you like to be contacted at? [phone]  How would you [facility]  [id number]}     BMI:   Estimated body mass index is 38.43 kg/m  as calculated from the following:    Height as of 11/4/20: 1.676 m (5' 6\").    Weight as of 11/4/20: 108 kg (238 lb 1.6 oz).   Weight management plan: Discussed healthy diet and exercise guidelines          No follow-ups on file.    Kennedy Banks MD  [facility]    Subjective     Supriya is a 70 year old who is assessed for follow-up of a right brachiocephalic AV fistula created two months ago. The initial flow from her fistula was very high, prompting concerns for steal. She initially did have coldness/numbness in her right hand with severe hand pain in both hands when her LEFT graft was accessed for dialysis. Her symptoms are difficult to characterize, however, given her long history of neuropathy in both hands and some arthritic pains.     She has had significant improvement in some of these symptoms over the past two months. She no longer has any hand pains or coldness during dialysis. Her left graft is still functioning well. She is able to tolerate the full run and does not need to come off early. She has some weakness and fumbling with her right hand, though this is slowly improving. She does state that her fingertips remain persistently cold and tingly, and that this feels somewhat different than her neuropathy from previously. She has just started taking gabapentin to attempt to relieve these symptoms. She is using the exercise ball and doing hand exercises to attempt to " improve these symptoms. Her last duplex demonstrated antegrade flow into her hand past the fistula anastomosis without evidence of steal.    HPI       Review of Systems   CONSTITUTIONAL: NEGATIVE for fever, chills, change in weight  INTEGUMENTARY/SKIN: NEGATIVE for worrisome rashes, moles or lesions  EYES: NEGATIVE for vision changes or irritation  ENT/MOUTH: NEGATIVE for ear, mouth and throat problems  RESP: NEGATIVE for significant cough or SOB  BREAST: NEGATIVE for masses, tenderness or discharge  CV: NEGATIVE for chest pain, palpitations or peripheral edema  GI: NEGATIVE for nausea, abdominal pain, heartburn, or change in bowel habits  : NEGATIVE for frequency, dysuria, or hematuria  MUSCULOSKELETAL: positive as above  NEURO: NEGATIVE for weakness, dizziness or paresthesias  ENDOCRINE: NEGATIVE for temperature intolerance, skin/hair changes  HEME: NEGATIVE for bleeding problems  PSYCHIATRIC: NEGATIVE for changes in mood or affect      Objective           Vitals:  No vitals were obtained today due to virtual visit.    Physical Exam   Deferred- telephone visit without video capability    No new results to review.      Assessment/Plan:    72 yo F s/p RUE AVF complicated by right hand coldness, numbness, and some pain/weakness. Picture confounded by extant neuropathy. Symptoms have overall improved, though she does feel some differences from prior to creation of fistula.     -I offered digital flow assessments to further characterize blood flow in her hand and see if she might benefit from banding or fistula revision. I am uncertain if this will be of benefit given the antegrade flow on her last fistula and have also offered repeat duplex. At this point, both the patient and her daughter feel that the risk and difficulty of additional surgery outweighs possible benefit and, as she has had some improvement and stability in these symptoms, they would defer both additional testing and further procedures.    -I  advised them to discuss with her nephrologist what a safe amount of gabapentin would be for her and to increase her dose to a scheduled/baseline dose accordingly and see if some of these symptoms improve, or if they remain persistent.    -We will revisit this in a few months to see how she is progressing. She is tolerating dialysis through her left graft at this point without issue. Her new fistula has matured well. It is possible she may have significant symptoms in using this fistula that would require further intervention- we should consider having her use her fistula now while her left graft is also available so that she would not require a line if revision is needed.         Phone call duration: 20 minutes  Kennedy Banks MD      Again, thank you for allowing me to participate in the care of your patient.        Sincerely,        Kennedy Banks MD

## 2021-01-12 ENCOUNTER — MYC MEDICAL ADVICE (OUTPATIENT)
Dept: FAMILY MEDICINE | Facility: CLINIC | Age: 71
End: 2021-01-12

## 2021-01-13 NOTE — TELEPHONE ENCOUNTER
Routing to provider - Renetta - please review and advise as appropriate    Patient has questions regarding COVID19 - vaccinatino

## 2021-01-16 DIAGNOSIS — E78.00 HYPERCHOLESTEROLEMIA: Primary | ICD-10-CM

## 2021-01-18 RX ORDER — ATORVASTATIN CALCIUM 20 MG/1
TABLET, FILM COATED ORAL
Qty: 90 TABLET | Refills: 0 | Status: SHIPPED | OUTPATIENT
Start: 2021-01-18 | End: 2021-04-30

## 2021-01-18 NOTE — TELEPHONE ENCOUNTER
"Requested Prescriptions   Pending Prescriptions Disp Refills     atorvastatin (LIPITOR) 20 MG tablet [Pharmacy Med Name: ATORVASTATIN 20 MG TABLET] 90 tablet 1     Sig: TAKE 1 TABLET BY MOUTH EVERY EVENING       Statins Protocol Passed - 1/16/2021  4:38 PM        Passed - LDL on file in past 12 months     Recent Labs   Lab Test 01/17/20  1204   LDL 74             Passed - No abnormal creatine kinase in past 12 months     Recent Labs   Lab Test 11/05/17  0903   CKT 18*                Passed - Recent (12 mo) or future (30 days) visit within the authorizing provider's specialty     Patient has had an office visit with the authorizing provider or a provider within the authorizing providers department within the previous 12 mos or has a future within next 30 days. See \"Patient Info\" tab in inbasket, or \"Choose Columns\" in Meds & Orders section of the refill encounter.              Passed - Medication is active on med list        Passed - Patient is age 18 or older        Passed - No active pregnancy on record        Passed - No positive pregnancy test in past 12 months           Routing refill request to provider for review/approval because:  Medication is reported/historical        "

## 2021-01-28 ENCOUNTER — MYC MEDICAL ADVICE (OUTPATIENT)
Dept: FAMILY MEDICINE | Facility: CLINIC | Age: 71
End: 2021-01-28

## 2021-01-28 NOTE — TELEPHONE ENCOUNTER
Dr. Jackson,    See my chart message    Thanks  Hiral Grimaldo, RN   [facility]

## 2021-02-08 ENCOUNTER — MYC MEDICAL ADVICE (OUTPATIENT)
Dept: FAMILY MEDICINE | Facility: CLINIC | Age: 71
End: 2021-02-08

## 2021-02-08 DIAGNOSIS — Z79.4 TYPE 2 DIABETES MELLITUS WITH DIABETIC NEUROPATHY, WITH LONG-TERM CURRENT USE OF INSULIN (H): ICD-10-CM

## 2021-02-08 DIAGNOSIS — E11.40 TYPE 2 DIABETES MELLITUS WITH DIABETIC NEUROPATHY, WITH LONG-TERM CURRENT USE OF INSULIN (H): ICD-10-CM

## 2021-02-09 RX ORDER — INSULIN GLARGINE 100 [IU]/ML
18 INJECTION, SOLUTION SUBCUTANEOUS AT BEDTIME
Qty: 15 ML | Refills: 0 | Status: SHIPPED | OUTPATIENT
Start: 2021-02-09 | End: 2021-04-22

## 2021-02-09 NOTE — TELEPHONE ENCOUNTER
insulin glargine (BASAGLAR KWIKPEN) 100 UNIT/ML pen      Last Written Prescription Date:  7/10/2020  Last Fill Quantity: 2 ml,   # refills: 3  Last Office Visit : 2/14/2020  Future Office visit:  none    Routing refill request to provider for review/approval because:  Insulin - refilled per clinic  *appointment past due- message sent to Endocrine scheduling

## 2021-02-12 ENCOUNTER — OFFICE VISIT (OUTPATIENT)
Dept: FAMILY MEDICINE | Facility: CLINIC | Age: 71
End: 2021-02-12
Payer: MEDICARE

## 2021-02-12 VITALS
HEART RATE: 68 BPM | DIASTOLIC BLOOD PRESSURE: 88 MMHG | TEMPERATURE: 98.1 F | OXYGEN SATURATION: 100 % | SYSTOLIC BLOOD PRESSURE: 138 MMHG

## 2021-02-12 DIAGNOSIS — E11.40 TYPE 2 DIABETES MELLITUS WITH DIABETIC NEUROPATHY, WITH LONG-TERM CURRENT USE OF INSULIN (H): ICD-10-CM

## 2021-02-12 DIAGNOSIS — L03.011 FELON OF FINGER OF RIGHT HAND: Primary | ICD-10-CM

## 2021-02-12 DIAGNOSIS — Z79.4 TYPE 2 DIABETES MELLITUS WITH DIABETIC NEUROPATHY, WITH LONG-TERM CURRENT USE OF INSULIN (H): ICD-10-CM

## 2021-02-12 DIAGNOSIS — I10 HYPERTENSION GOAL BP (BLOOD PRESSURE) < 140/90: ICD-10-CM

## 2021-02-12 PROCEDURE — 99214 OFFICE O/P EST MOD 30 MIN: CPT | Performed by: FAMILY MEDICINE

## 2021-02-12 RX ORDER — DOXYCYCLINE HYCLATE 100 MG
100 TABLET ORAL 2 TIMES DAILY
COMMUNITY
End: 2021-04-09

## 2021-02-12 NOTE — PROGRESS NOTES
Assessment & Plan     Felon of finger of right hand  Now I and D's dressing placed  conintue antibiotic   soak in warm water 4-45 times/ day and cove r with bacitracin    Type 2 diabetes mellitus with diabetic neuropathy, with long-term current use of insulin (H)  Stable  Lab Results   Component Value Date    A1C 6.0 06/22/2018    A1C 5.4 03/29/2018    A1C 6.8 01/09/2018    A1C 9.6 06/09/2017    A1C 6.9 02/14/2017         Hypertension goal BP (blood pressure) < 140/90  Now Well controlled                See Patient Instructions    No follow-ups on file.    Noam Jackson MD  [facility] 71 [facility] who presents for the following health issues  accompanied by her daughter:    HPI       Finger Pain / discolored  Onset/Duration: 3 weeks initially , in ER 2/6/21 last week  Description (location/number): right hand , middle finger, ring finger  Accompanying signs and symptoms (pain, redness): YES  History: prior warts: no  Therapies tried and outcome: doxycycline , color change and swollen          Diabetes Follow-up       BP Readings from Last 2 Encounters:   02/12/21 138/88   12/14/20 (!) 188/95     Hemoglobin A1C (%)   Date Value   06/22/2018 6.0 (H)   03/29/2018 5.4     LDL Cholesterol Calculated (mg/dL)   Date Value   01/17/2020 74   03/29/2018 108 (H)         Chronic Kidney Disease Follow-up        you miss taking your medication? 0      Review of Systems   Constitutional, HEENT, cardiovascular, pulmonary, gi and gu systems are negative, except as otherwise noted.      Objective    /88   Pulse 68   Temp 98.1  F (36.7  C) (Temporal)   SpO2 100%   There is no height or weight on file to calculate BMI.  Physical Exam   GENERAL: alert and mild distress  CV: regular rates and rhythm  SKIN: distal right 3rd finger shows eschar and slightly fluctuant mass at finger tip   Effected area cleaned   with alcoholol.   Number 15 scapel used to make a stab  incision.  Purlent drainage was removed .   Appropriate wound care dressing applied.  Pt tolerated preocedure well.

## 2021-02-16 DIAGNOSIS — F41.1 GAD (GENERALIZED ANXIETY DISORDER): Primary | ICD-10-CM

## 2021-02-17 ENCOUNTER — MYC MEDICAL ADVICE (OUTPATIENT)
Dept: FAMILY MEDICINE | Facility: CLINIC | Age: 71
End: 2021-02-17

## 2021-02-17 DIAGNOSIS — L03.019 CELLULITIS OF FINGER, UNSPECIFIED LATERALITY: Primary | ICD-10-CM

## 2021-02-17 NOTE — TELEPHONE ENCOUNTER
Patient reporting ongoing symptom concerns with regard to finger infection    Last office visit 2/12/21    Felon of finger of right hand  Now I and D's dressing placed  conintue antibiotic   soak in warm water 4-45 times/ day and cove r with bacitracin    SKIN: distal right 3rd finger shows eschar and slightly fluctuant mass at finger tip   Effected area cleaned   with alcoholol.   Number 15 scapel used to make a stab incision.  Purlent drainage was removed .   Appropriate wound care dressing applied.  Pt tolerated preocedure well.

## 2021-02-17 NOTE — TELEPHONE ENCOUNTER
"Requested Prescriptions   Pending Prescriptions Disp Refills     sertraline (ZOLOFT) 50 MG tablet 90 tablet      Sig: Take 1 tablet (50 mg) by mouth At Bedtime       SSRIs Protocol Passed - 2/16/2021  8:54 PM        Passed - Recent (12 mo) or future (30 days) visit within the authorizing provider's specialty     Patient has had an office visit with the authorizing provider or a provider within the authorizing providers department within the previous 12 mos or has a future within next 30 days. See \"Patient Info\" tab in inbasket, or \"Choose Columns\" in Meds & Orders section of the refill encounter.              Passed - Medication is active on med list        Passed - Patient is age 18 or older        Passed - No active pregnancy on record        Passed - No positive pregnancy test in last 12 months           Routing refill request to provider for review/approval because:  Medication is reported/historical        "

## 2021-02-19 RX ORDER — SULFAMETHOXAZOLE/TRIMETHOPRIM 800-160 MG
1 TABLET ORAL 2 TIMES DAILY
Qty: 20 TABLET | Refills: 0 | Status: SHIPPED | OUTPATIENT
Start: 2021-02-19 | End: 2021-03-01

## 2021-02-19 NOTE — TELEPHONE ENCOUNTER
Switch to new antibiotic  Orders Placed This Encounter     sulfamethoxazole-trimethoprim (BACTRIM DS) 800-160 MG tablet     Sig: Take 1 tablet by mouth 2 times daily for 10 days     Dispense:  20 tablet     Refill:  0

## 2021-02-19 NOTE — TELEPHONE ENCOUNTER
Dr. Jackson,    Please see [employer] messages.    Pharmacy cued.    Kamryn Bloom RN  [facility]

## 2021-02-22 ENCOUNTER — MYC MEDICAL ADVICE (OUTPATIENT)
Dept: FAMILY MEDICINE | Facility: CLINIC | Age: 71
End: 2021-02-22

## 2021-03-04 ENCOUNTER — OFFICE VISIT (OUTPATIENT)
Dept: FAMILY MEDICINE | Facility: CLINIC | Age: 71
End: 2021-03-04
Payer: MEDICARE

## 2021-03-04 VITALS
DIASTOLIC BLOOD PRESSURE: 78 MMHG | HEART RATE: 68 BPM | SYSTOLIC BLOOD PRESSURE: 128 MMHG | TEMPERATURE: 98.6 F | OXYGEN SATURATION: 98 %

## 2021-03-04 DIAGNOSIS — Z99.2 TYPE 2 DIABETES MELLITUS WITH CHRONIC KIDNEY DISEASE ON CHRONIC DIALYSIS, WITH LONG-TERM CURRENT USE OF INSULIN (H): ICD-10-CM

## 2021-03-04 DIAGNOSIS — E11.22 TYPE 2 DIABETES MELLITUS WITH CHRONIC KIDNEY DISEASE ON CHRONIC DIALYSIS, WITH LONG-TERM CURRENT USE OF INSULIN (H): ICD-10-CM

## 2021-03-04 DIAGNOSIS — Z79.4 TYPE 2 DIABETES MELLITUS WITH CHRONIC KIDNEY DISEASE ON CHRONIC DIALYSIS, WITH LONG-TERM CURRENT USE OF INSULIN (H): ICD-10-CM

## 2021-03-04 DIAGNOSIS — R23.4 ESCHAR OF FINGER: Primary | ICD-10-CM

## 2021-03-04 DIAGNOSIS — N18.6 TYPE 2 DIABETES MELLITUS WITH CHRONIC KIDNEY DISEASE ON CHRONIC DIALYSIS, WITH LONG-TERM CURRENT USE OF INSULIN (H): ICD-10-CM

## 2021-03-04 PROCEDURE — 99214 OFFICE O/P EST MOD 30 MIN: CPT | Performed by: FAMILY MEDICINE

## 2021-03-04 NOTE — PROGRESS NOTES
"    Assessment & Plan     Eschar of finger  Use finger splint to protect it  Soak bid  Follow up with consultant as planned.   - DERMATOLOGY ADULT REFERRAL; Future    Type 2 diabetes mellitus with chronic kidney disease on chronic dialysis, with long-term current use of insulin (H)  Stable  Follow up with consultant as planned.                    No follow-ups on file.    Noam Jackson MD  [facility] NELLY Epps is a 71 year old who presents for the following health issues     HPI         Finger Infection Follow up- less red but still painful/ has thick brown scar, finger aches/ burning sesatio at tines   \" keeps hitting it\"  Onset: ongoing  Description: Swollen, discolored and bump  Intensity: mild, severe  Progression of Symptoms:  improving and constant  Accompanying Signs & Symptoms: pain, swollen and burning  Previous history of similar problem: has pustrule  Precipitating factors:        Worsened by: warm water and hitting finger  Alleviating factors:        Improved by: soaks  Therapies tried and outcome: soaking        Review of Systems   Constitutional, HEENT, cardiovascular, pulmonary, gi and gu systems are negative, except as otherwise noted.      Objective    /78 (BP Location: Right leg, Patient Position: Sitting, Cuff Size: Adult Large)   Pulse 68   Temp 98.6  F (37  C) (Temporal)   SpO2 98%   There is no height or weight on file to calculate BMI.  Physical Exam   GENERAL: alert and mild distress  SKIN: thick brown eschar 3  fingertip, slightl swelling, no redness                 "

## 2021-03-11 NOTE — PROGRESS NOTES
Chief Complaint   Patient presents with     RECHECK     foot care            Allergies   Allergen Reactions     Penicillins Rash     Unasyn Rash     No evidence SJS, but very uncomfortable and precipitated multiple provider visits. Would not use penicillins again if other options available.          Subjective: Supriya is a 69 year old female who presents to the clinic today for a diabetic foot exam and management.  Her nails do get long.  She relates that she bumped the right third toe.  There is now a scab on top of the toe.  No pain noted.     Objective    Hemoglobin A1C   Date Value Ref Range Status   06/22/2018 6.0 (H) 0 - 5.6 % Final     Comment:     Normal <5.7% Prediabetes 5.7-6.4%  Diabetes 6.5% or higher - adopted from ADA   consensus guidelines.           Non-palpable DP and PT pulses BL.   Equinus noted BL. Pes planus with rigid toe deformities noted to lesser digits on the right. Left AKA noted.   Nails are thickened, discolored, elongated, with subungual debris consistent with onychomycosis.    Scabbed venous ulcer on the anterior right leg. No s/s of infection.  No open lesion associated. No bleeding. No pain to the area. Small scar noted to the plantar right 1st interspace. No s/s of infection. Hyperkeratosis to the area.  There is a scab noted to the dorsal 3rd digit. No s/s of infection noted.      Assessment: DM2 with left AKA and neuropathy - presenting for a diabetic foot exam.   Onychomycosis.   Tyloma     Plan:   - Pt seen and evaluated  - Nails debrided x 5.  - Tyloma debrided x 1  - Cont compression socks.  - Silicone toe sleeve to the 3rd digit.   - See again in 3 months.

## 2021-03-12 ENCOUNTER — TELEPHONE (OUTPATIENT)
Dept: FAMILY MEDICINE | Facility: CLINIC | Age: 71
End: 2021-03-12

## 2021-03-12 ENCOUNTER — OFFICE VISIT (OUTPATIENT)
Dept: ORTHOPEDICS | Facility: CLINIC | Age: 71
End: 2021-03-12
Payer: MEDICARE

## 2021-03-12 DIAGNOSIS — E11.49 TYPE II OR UNSPECIFIED TYPE DIABETES MELLITUS WITH NEUROLOGICAL MANIFESTATIONS, NOT STATED AS UNCONTROLLED(250.60) (H): Primary | ICD-10-CM

## 2021-03-12 DIAGNOSIS — B35.1 OM (ONYCHOMYCOSIS): ICD-10-CM

## 2021-03-12 DIAGNOSIS — L84 TYLOMA: ICD-10-CM

## 2021-03-12 DIAGNOSIS — Z89.612 S/P AKA (ABOVE KNEE AMPUTATION) UNILATERAL, LEFT (H): ICD-10-CM

## 2021-03-12 PROCEDURE — 99214 OFFICE O/P EST MOD 30 MIN: CPT | Performed by: PODIATRIST

## 2021-03-12 NOTE — TELEPHONE ENCOUNTER
Forms completed and faxed back to [employer] @ [phone]. Placed into [employer] to scan into patients chart.   Hernán Goyal MA

## 2021-03-12 NOTE — NURSING NOTE
Reason For Visit:   Chief Complaint   Patient presents with     RECHECK     foot care         Diana Lu, ATC

## 2021-03-12 NOTE — LETTER
3/12/2021         RE: Supriya Herr  [address]  [facility] [address]        Dear Colleague,    Thank you for referring your patient, Supriya Herr, to the [facility] [place]. Please see a copy of my visit note below.    Chief Complaint   Patient presents with     RECHECK     foot care            Allergies   Allergen Reactions     Penicillins Rash     Unasyn Rash     No evidence SJS, but very uncomfortable and precipitated multiple provider visits. Would not use penicillins again if other options available.          Subjective: Supriya is a 69 year old female who presents to the clinic today for a diabetic foot exam and management.  Her nails do get long.  She relates that she bumped the right third toe.  There is now a scab on top of the toe.  No pain noted.     Objective    Hemoglobin A1C   Date Value Ref Range Status   06/22/2018 6.0 (H) 0 - 5.6 % Final     Comment:     Normal <5.7% Prediabetes 5.7-6.4%  Diabetes 6.5% or higher - adopted from ADA   consensus guidelines.           Non-palpable DP and PT pulses BL.   Equinus noted BL. Pes planus with rigid toe deformities noted to lesser digits on the right. Left AKA noted.   Nails are thickened, discolored, elongated, with subungual debris consistent with onychomycosis.    Scabbed venous ulcer on the anterior right leg. No s/s of infection.  No open lesion associated. No bleeding. No pain to the area. Small scar noted to the plantar right 1st interspace. No s/s of infection. Hyperkeratosis to the area.  There is a scab noted to the dorsal 3rd digit. No s/s of infection noted.      Assessment: DM2 with left AKA and neuropathy - presenting for a diabetic foot exam.   Onychomycosis.   Tyloma     Plan:   - Pt seen and evaluated  - Nails debrided x 5.  - Tyloma debrided x 1  - Cont compression socks.  - Silicone toe sleeve to the 3rd digit.   - See again in 3 months.    Eleazar Pack, FRAN

## 2021-03-12 NOTE — PATIENT INSTRUCTIONS
For the 3rd toe, use a silicone toe sleeve when in shoes. Take off when not wearing shoes and at night.     Prescription for diabetic shoe.

## 2021-03-15 ENCOUNTER — TELEPHONE (OUTPATIENT)
Dept: DERMATOLOGY | Facility: CLINIC | Age: 71
End: 2021-03-15

## 2021-03-15 ENCOUNTER — TELEPHONE (OUTPATIENT)
Dept: FAMILY MEDICINE | Facility: CLINIC | Age: 71
End: 2021-03-15

## 2021-03-15 NOTE — TELEPHONE ENCOUNTER
[facility] Call Center    Phone Message    May a detailed message be left on voicemail: no     Reason for Call: Other: Suzie Garza RN from Dr Jackson's office called. Dr Jackson believes Pt has Dry Gangrene and wants Pt to be seen sooner than 4/23.    Pt was not able to take the 3/18/21 that was open at the time of call. And there were no other sooner appts. Please call Pt to advise if they can be seen sooner.    Thanks!    Action Taken: Message routed to:  [facility] Derm    Travel Screening: Not Applicable

## 2021-03-15 NOTE — TELEPHONE ENCOUNTER
Call placed to Derm, the derm clinic will call daughter Caroline Gray in the next day or to to see when they can get her in sooner, daughter alexander Sprague RN   [facility]

## 2021-03-15 NOTE — TELEPHONE ENCOUNTER
Would you mind calling the U and see if you can fernando her in sooner   she has what looks like dry gangrene on her finger and a month and a 1/2 is too long to wait

## 2021-03-19 ENCOUNTER — OFFICE VISIT (OUTPATIENT)
Dept: DERMATOLOGY | Facility: CLINIC | Age: 71
End: 2021-03-19
Payer: MEDICARE

## 2021-03-19 DIAGNOSIS — T82.898A STEAL SYNDROME AS COMPLICATION OF DIALYSIS ACCESS, INITIAL ENCOUNTER (H): ICD-10-CM

## 2021-03-19 DIAGNOSIS — M79.644 PAIN OF FINGER OF RIGHT HAND: Primary | ICD-10-CM

## 2021-03-19 DIAGNOSIS — N18.6 ESRD ON DIALYSIS (H): Primary | ICD-10-CM

## 2021-03-19 DIAGNOSIS — Z99.2 ESRD ON DIALYSIS (H): Primary | ICD-10-CM

## 2021-03-19 PROCEDURE — 87077 CULTURE AEROBIC IDENTIFY: CPT | Performed by: PATHOLOGY

## 2021-03-19 PROCEDURE — 11042 DBRDMT SUBQ TIS 1ST 20SQCM/<: CPT | Performed by: DERMATOLOGY

## 2021-03-19 PROCEDURE — 99214 OFFICE O/P EST MOD 30 MIN: CPT | Mod: 25 | Performed by: DERMATOLOGY

## 2021-03-19 PROCEDURE — 87106 FUNGI IDENTIFICATION YEAST: CPT | Performed by: PATHOLOGY

## 2021-03-19 PROCEDURE — 87070 CULTURE OTHR SPECIMN AEROBIC: CPT | Performed by: PATHOLOGY

## 2021-03-19 RX ORDER — DOXYCYCLINE 100 MG/1
100 CAPSULE ORAL 2 TIMES DAILY
Qty: 28 CAPSULE | Refills: 0 | Status: SHIPPED | OUTPATIENT
Start: 2021-03-19 | End: 2021-04-02

## 2021-03-19 ASSESSMENT — PAIN SCALES - GENERAL: PAINLEVEL: MILD PAIN (2)

## 2021-03-19 NOTE — PATIENT INSTRUCTIONS
Two teaspoons plain bleach per gallon of water, soak 5-10 minutes per day  Keep covered with gentamicin ointment and bandaid  Ok to apply topical morphine get 3xper day for pain  Doxycycline tab twice daily           [facility]    You have been prescribed a medication(s) that will be sent to our [facility]. Please contact the pharmacy within the next few days to get registered in their system and provide your insurance information.  The pharmacy will be unable to process any medication(s) until they have this information.    Please call the pharmacy to complete this step by calling the pharmacy at [phone] with 24-48 hours after being seen in the clinic.     Once you are registered in the pharmacy system, the pharmacy staff will complete a benefits check and contact you with the cost before any medications are billed or mailed.     There are no additional costs to you for mailing the medication(s).

## 2021-03-19 NOTE — LETTER
3/19/2021       RE: Supriya Herr  [address]  [facility] [address]     Dear Colleague,    Thank you for referring your patient, Supriya Herr, to the [facility] [place] [facility]. Please see a copy of my visit note below.    Dermatology Clinic Visit Note    DPL:  1. Dialysis dependent with fistula in the R arm, concern for steal phenomenon  2. Ischemic ulcer of the R 3rd finger, suspect due to #1 also in the setting of neuropathy  3. Psoriasis, inverse and guttate   - M-F calcipotriene BID, Sa-[place] betamethasone ointment  - apremilast with renal dosing (started 1/2020)  4. Hx morbilliform drug eruption 2/2 Unasyn 7/2018 (resolved)  5. Vitiligo  6. Hx NMSC  - BCC (reported), R ankle    CHIEF COMPLAINT:  Eschar on finger.      HISTORY OF PRESENT ILLNESS:  Supriya Herr a 71-year-old female presenting to Dermatology Clinic for evaluation of an eschar on the right third finger.  The lesion has been present for many months.  She states that she initially developed right middle finger redness and swelling in January.  She had had subsequent skin breakdown.  She was seen in urgent care on 02/06/2021.  She was given a 10-day course of oral doxycycline at that time.  She notes that this helped to improve the redness and swelling, but she continues to have a crusted papule in the area.  Her daughter states that her right hand has had poor circulation since she has had a new fistula placed, which was moved to the right arm.  When she was seen by Dr. Jackson on 02/12/2021, she had been soaking the finger in water daily and applying bacitracin.  He noted purulent discharge.  A wound culture was not performed at that time.  The patient notes that when soaking her fingers, she has associated pain.  No past history of similar lesions, although patient has known type 2 diabetes with diabetic neuropathy in the hands and in the feet for  which she is on gabapentin.     Daughter provides additional history today.      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)     Contact dermatitis and other eczema, due to unspecified cause     Dermatophytosis of nail     Generalized osteoarthrosis, unspecified site     Hypertension goal BP (blood pressure) < 140/90     Moderate nonproliferative diabetic retinopathy associated with type 2 diabetes mellitus (H)     Proteinuria     Stage I pressure ulcer     Hyperlipidemia LDL goal <100     Non compliance with medical treatment     Incontinence of urine     Basal cell carcinoma     Senile nuclear sclerosis     JONE (obstructive sleep apnea)     CHF (congestive heart failure) (H)     Health Care Home     Type 2 diabetes mellitus with diabetic chronic kidney disease (H)     Moderate recurrent major depression (H)     Type 2 diabetes mellitus with diabetic neuropathy, with long-term current use of insulin (H)     Macular cyst, hole, or pseudohole of retina     Traumatic amputation of left lower extremity above knee, subsequent encounter     Former tobacco use     Type 2 diabetes mellitus (H)     Dyslipidemia     Anemia in chronic kidney disease     CKD (chronic kidney disease) stage 5, GFR less than 15 ml/min (H)     Obesity (BMI 30-39.9)     Anxiety and depression     Cervical cancer screening     Diabetic foot infection (H)     Plantar ulcer of right foot with fat layer exposed (H)     Cellulitis     Intertrigo     Drug rash     Wheelchair bound     Combined forms of age-related cataract of right eye     Pseudophakia of left eye     Anemia, iron deficiency     Chronic kidney disease, stage 5, kidney failure (H)     Encounter regarding vascular access for dialysis for ESRD (H)     Postoperative nausea     PVD (peripheral vascular disease) (H)     ESRD on dialysis (H)     Encounter regarding vascular access for dialysis for end-stage renal disease (H)      Encounter for adjustment and management of vascular access device       Allergies   Allergen Reactions     Penicillins Rash     Unasyn Rash     No evidence SJS, but very uncomfortable and precipitated multiple provider visits. Would not use penicillins again if other options available.          Current Outpatient Medications   Medication     acetaminophen (TYLENOL) 325 MG tablet     albuterol (PROAIR HFA/PROVENTIL HFA/VENTOLIN HFA) 108 (90 Base) MCG/ACT inhaler     amLODIPine (NORVASC) 5 MG tablet     apremilast (OTEZLA) 30 MG tablet     atorvastatin (LIPITOR) 20 MG tablet     blood glucose (ONE TOUCH DELICA) lancing device     blood glucose (ONETOUCH ULTRA) test strip     carvedilol (COREG) 25 MG tablet     desonide (DESOWEN) 0.05 % external ointment     doxycycline hyclate (VIBRA-TABS) 100 MG tablet     doxycycline monohydrate (MONODOX) 100 MG capsule     Epoetin Martínez (EPOGEN IJ)     furosemide (LASIX) 80 MG tablet     gabapentin (NEURONTIN) 100 MG capsule     gentamicin (GARAMYCIN) 0.3 % ophthalmic ointment     insulin aspart (NOVOLOG FLEXPEN) 100 UNIT/ML pen     insulin glargine (BASAGLAR KWIKPEN) 100 UNIT/ML pen     insulin pen needle (ULTICARE MINI) 31G X 6 MM     Iron Sucrose (VENOFER IV)     morphine 0.1% in solosite gel     order for DME     order for DME     Probiotic Product (PROBIOTIC PO)     RENVELA 800 MG tablet     sertraline (ZOLOFT) 50 MG tablet     sevelamer carbonate (RENVELA) 800 MG tablet     vitamin D3 (CHOLECALCIFEROL) 2000 units (50 mcg) tablet     No current facility-administered medications for this visit.        SOCIAL HISTORY:  The patient's daughter is her primary caretaker.  She lives in [place].      REVIEW OF SYSTEMS:  Positive for pain and swelling in the right third and fourth finger.      PHYSICAL EXAMINATION:   GENERAL:  The patient is a healthy-appearing female in a wheelchair in no distress.   HEENT:  Conjunctivae clear.  Glasses in place.   PULMONARY:  Breathing comfortably  on room air.   SKIN:  Exam was focused on the hands.  Examination of the right hand is cooler to the touch than the left hand.  The distal right third fingertip with an approximately 8 mm black eschar with surrounding collarette of scale and tenderness to palpation.   -Distal onycholysis of the right third finger.   -Depigmented patches over the bilateral dorsal hands.      PROCEDURE NOTE:  The patient's wound was soaked in hydrogen peroxide diluted 1:1 with water for 10 minutes.  A disposable curette was used to debride the area.  Bacterial culture obtained. Vaseline and dressing placed.      ASSESSMENT AND PLAN:    Presumed ischemic ulcer on the right third digit.  Currently eschar is over the area, making the ulcer unstageable. High risk of secondary infection given necrotic tissue, DM2, relative immunosuppressed status and neuropathy.    -Recommended soaking at home daily with dilute bleach water and application of topical morphine gel up to t.i.d. for pain.  Continue doxycycline, pending culture results.    -We will reach out to the patient's vascular surgeon to discuss other options for improving perfusion in the hand due to concern that this could result in either recurrence, gangrene or secondary infections in this or other fingers.      The patient to return in 1 week's time.     Ciara Melvin MD   of Dermatology  [facility]

## 2021-03-19 NOTE — NURSING NOTE
Chief Complaint   Patient presents with     Derm Problem     Supriya is here for follow up of gangrene on her middle finger on right hand.      Gracie Rdz LPN

## 2021-03-19 NOTE — PROGRESS NOTES
Dermatology Clinic Visit Note    DPL:  1. Dialysis dependent with fistula in the R arm, concern for steal phenomenon  2. Ischemic ulcer of the R 3rd finger, suspect due to #1 also in the setting of neuropathy  3. Psoriasis, inverse and guttate   - M-F calcipotriene BID, Sa-[place] betamethasone ointment  - apremilast with renal dosing (started 1/2020)  4. Hx morbilliform drug eruption 2/2 Unasyn 7/2018 (resolved)  5. Vitiligo  6. Hx NMSC  - BCC (reported), R ankle    CHIEF COMPLAINT:  Eschar on finger.      HISTORY OF PRESENT ILLNESS:  Supriya Herr a 71-year-old female presenting to Dermatology Clinic for evaluation of an eschar on the right third finger.  The lesion has been present for many months.  She states that she initially developed right middle finger redness and swelling in January.  She had had subsequent skin breakdown.  She was seen in urgent care on 02/06/2021.  She was given a 10-day course of oral doxycycline at that time.  She notes that this helped to improve the redness and swelling, but she continues to have a crusted papule in the area.  Her daughter states that her right hand has had poor circulation since she has had a new fistula placed, which was moved to the right arm.  When she was seen by Dr. Jackson on 02/12/2021, she had been soaking the finger in water daily and applying bacitracin.  He noted purulent discharge.  A wound culture was not performed at that time.  The patient notes that when soaking her fingers, she has associated pain.  No past history of similar lesions, although patient has known type 2 diabetes with diabetic neuropathy in the hands and in the feet for which she is on gabapentin.     Daughter provides additional history today.      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)     Contact dermatitis and other eczema, due to unspecified cause     Dermatophytosis of nail     Generalized osteoarthrosis,  unspecified site     Hypertension goal BP (blood pressure) < 140/90     Moderate nonproliferative diabetic retinopathy associated with type 2 diabetes mellitus (H)     Proteinuria     Stage I pressure ulcer     Hyperlipidemia LDL goal <100     Non compliance with medical treatment     Incontinence of urine     Basal cell carcinoma     Senile nuclear sclerosis     JONE (obstructive sleep apnea)     CHF (congestive heart failure) (H)     Health Care Home     Type 2 diabetes mellitus with diabetic chronic kidney disease (H)     Moderate recurrent major depression (H)     Type 2 diabetes mellitus with diabetic neuropathy, with long-term current use of insulin (H)     Macular cyst, hole, or pseudohole of retina     Traumatic amputation of left lower extremity above knee, subsequent encounter     Former tobacco use     Type 2 diabetes mellitus (H)     Dyslipidemia     Anemia in chronic kidney disease     CKD (chronic kidney disease) stage 5, GFR less than 15 ml/min (H)     Obesity (BMI 30-39.9)     Anxiety and depression     Cervical cancer screening     Diabetic foot infection (H)     Plantar ulcer of right foot with fat layer exposed (H)     Cellulitis     Intertrigo     Drug rash     Wheelchair bound     Combined forms of age-related cataract of right eye     Pseudophakia of left eye     Anemia, iron deficiency     Chronic kidney disease, stage 5, kidney failure (H)     Encounter regarding vascular access for dialysis for ESRD (H)     Postoperative nausea     PVD (peripheral vascular disease) (H)     ESRD on dialysis (H)     Encounter regarding vascular access for dialysis for end-stage renal disease (H)     Encounter for adjustment and management of vascular access device       Allergies   Allergen Reactions     Penicillins Rash     Unasyn Rash     No evidence SJS, but very uncomfortable and precipitated multiple provider visits. Would not use penicillins again if other options available.          Current Outpatient  Medications   Medication     acetaminophen (TYLENOL) 325 MG tablet     albuterol (PROAIR HFA/PROVENTIL HFA/VENTOLIN HFA) 108 (90 Base) MCG/ACT inhaler     amLODIPine (NORVASC) 5 MG tablet     apremilast (OTEZLA) 30 MG tablet     atorvastatin (LIPITOR) 20 MG tablet     blood glucose (ONE TOUCH DELICA) lancing device     blood glucose (ONETOUCH ULTRA) test strip     carvedilol (COREG) 25 MG tablet     desonide (DESOWEN) 0.05 % external ointment     doxycycline hyclate (VIBRA-TABS) 100 MG tablet     doxycycline monohydrate (MONODOX) 100 MG capsule     Epoetin Martínez (EPOGEN IJ)     furosemide (LASIX) 80 MG tablet     gabapentin (NEURONTIN) 100 MG capsule     gentamicin (GARAMYCIN) 0.3 % ophthalmic ointment     insulin aspart (NOVOLOG FLEXPEN) 100 UNIT/ML pen     insulin glargine (BASAGLAR KWIKPEN) 100 UNIT/ML pen     insulin pen needle (ULTICARE MINI) 31G X 6 MM     Iron Sucrose (VENOFER IV)     morphine 0.1% in solosite gel     order for DME     order for DME     Probiotic Product (PROBIOTIC PO)     RENVELA 800 MG tablet     sertraline (ZOLOFT) 50 MG tablet     sevelamer carbonate (RENVELA) 800 MG tablet     vitamin D3 (CHOLECALCIFEROL) 2000 units (50 mcg) tablet     No current facility-administered medications for this visit.        SOCIAL HISTORY:  The patient's daughter is her primary caretaker.  She lives in [place].      REVIEW OF SYSTEMS:  Positive for pain and swelling in the right third and fourth finger.      PHYSICAL EXAMINATION:   GENERAL:  The patient is a healthy-appearing female in a wheelchair in no distress.   HEENT:  Conjunctivae clear.  Glasses in place.   PULMONARY:  Breathing comfortably on room air.   SKIN:  Exam was focused on the hands.  Examination of the right hand is cooler to the touch than the left hand.  The distal right third fingertip with an approximately 8 mm black eschar with surrounding collarette of scale and tenderness to palpation.   -Distal onycholysis of the right third  finger.   -Depigmented patches over the bilateral dorsal hands.      PROCEDURE NOTE:  The patient's wound was soaked in hydrogen peroxide diluted 1:1 with water for 10 minutes.  A disposable curette was used to debride the area.  Bacterial culture obtained. Vaseline and dressing placed.      ASSESSMENT AND PLAN:    Presumed ischemic ulcer on the right third digit.  Currently eschar is over the area, making the ulcer unstageable. High risk of secondary infection given necrotic tissue, DM2, relative immunosuppressed status and neuropathy.    -Recommended soaking at home daily with dilute bleach water and application of topical morphine gel up to t.i.d. for pain.  Continue doxycycline, pending culture results.    -We will reach out to the patient's vascular surgeon to discuss other options for improving perfusion in the hand due to concern that this could result in either recurrence, gangrene or secondary infections in this or other fingers.      The patient to return in 1 week's time.     Ciara Melvin MD   of Dermatology  [facility]

## 2021-03-21 LAB
BACTERIA SPEC CULT: ABNORMAL
Lab: ABNORMAL
SPECIMEN SOURCE: ABNORMAL

## 2021-03-22 ENCOUNTER — OFFICE VISIT (OUTPATIENT)
Dept: TRANSPLANT | Facility: CLINIC | Age: 71
End: 2021-03-22
Attending: CLINICAL NURSE SPECIALIST
Payer: MEDICARE

## 2021-03-22 ENCOUNTER — ANCILLARY PROCEDURE (OUTPATIENT)
Dept: ULTRASOUND IMAGING | Facility: CLINIC | Age: 71
End: 2021-03-22
Attending: CLINICAL NURSE SPECIALIST
Payer: MEDICARE

## 2021-03-22 VITALS — OXYGEN SATURATION: 97 % | SYSTOLIC BLOOD PRESSURE: 140 MMHG | DIASTOLIC BLOOD PRESSURE: 74 MMHG | HEART RATE: 64 BPM

## 2021-03-22 DIAGNOSIS — E11.3213 TYPE 2 DIABETES MELLITUS WITH MILD NONPROLIFERATIVE RETINOPATHY OF BOTH EYES AND MACULAR EDEMA, UNSPECIFIED WHETHER LONG TERM INSULIN USE (H): Primary | ICD-10-CM

## 2021-03-22 DIAGNOSIS — T82.898A STEAL SYNDROME AS COMPLICATION OF DIALYSIS ACCESS, INITIAL ENCOUNTER (H): Primary | ICD-10-CM

## 2021-03-22 DIAGNOSIS — Z99.2 ESRD ON DIALYSIS (H): ICD-10-CM

## 2021-03-22 DIAGNOSIS — N18.6 ESRD ON DIALYSIS (H): ICD-10-CM

## 2021-03-22 DIAGNOSIS — T82.898A STEAL SYNDROME AS COMPLICATION OF DIALYSIS ACCESS, INITIAL ENCOUNTER (H): ICD-10-CM

## 2021-03-22 DIAGNOSIS — M79.644 PAIN OF FINGER OF RIGHT HAND: Primary | ICD-10-CM

## 2021-03-22 PROCEDURE — 93922 UPR/L XTREMITY ART 2 LEVELS: CPT | Mod: V7 | Performed by: RADIOLOGY

## 2021-03-22 PROCEDURE — 99213 OFFICE O/P EST LOW 20 MIN: CPT | Performed by: SURGERY

## 2021-03-22 PROCEDURE — 93990 DOPPLER FLOW TESTING: CPT | Mod: V7 | Performed by: RADIOLOGY

## 2021-03-22 RX ORDER — NYSTATIN 100000 U/G
OINTMENT TOPICAL
Qty: 15 G | Refills: 1 | Status: SHIPPED | OUTPATIENT
Start: 2021-03-22 | End: 2021-03-23
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
## 2021-03-24 ENCOUNTER — OFFICE VISIT (OUTPATIENT)
Dept: OPHTHALMOLOGY | Facility: CLINIC | Age: 71
End: 2021-03-24
Attending: OPHTHALMOLOGY
Payer: MEDICARE

## 2021-03-24 DIAGNOSIS — E11.3213 TYPE 2 DIABETES MELLITUS WITH MILD NONPROLIFERATIVE RETINOPATHY OF BOTH EYES AND MACULAR EDEMA, UNSPECIFIED WHETHER LONG TERM INSULIN USE (H): ICD-10-CM

## 2021-03-24 PROCEDURE — 92012 INTRM OPH EXAM EST PATIENT: CPT | Mod: GC | Performed by: OPHTHALMOLOGY

## 2021-03-24 PROCEDURE — 92134 CPTRZ OPH DX IMG PST SGM RTA: CPT | Performed by: OPHTHALMOLOGY

## 2021-03-24 PROCEDURE — G0463 HOSPITAL OUTPT CLINIC VISIT: HCPCS

## 2021-03-24 ASSESSMENT — TONOMETRY
OD_IOP_MMHG: 9
OS_IOP_MMHG: 9
IOP_METHOD: ICARE

## 2021-03-24 ASSESSMENT — REFRACTION_WEARINGRX
OD_AXIS: 093
OD_SPHERE: -5.25
OS_CYLINDER: +1.75
OD_CYLINDER: +1.00
OD_SPHERE: -1.00
OS_AXIS: 135
OS_CYLINDER: +1.75
OD_CYLINDER: +1.50
OD_AXIS: 075
OS_SPHERE: -4.00
OS_SPHERE: -3.50

## 2021-03-24 ASSESSMENT — VISUAL ACUITY
OD_CC+: -3
OS_CC: 20/25
METHOD: SNELLEN - LINEAR
OS_CC+: -3
OD_CC: 20/20

## 2021-03-24 ASSESSMENT — CONF VISUAL FIELD
OS_NORMAL: 1
OD_NORMAL: 1
METHOD: COUNTING FINGERS

## 2021-03-24 ASSESSMENT — SLIT LAMP EXAM - LIDS
COMMENTS: NORMAL
COMMENTS: NORMAL

## 2021-03-24 ASSESSMENT — CUP TO DISC RATIO
OS_RATIO: 0.3
OD_RATIO: 0.3

## 2021-03-24 NOTE — NURSING NOTE
Chief Complaints and History of Present Illnesses   Patient presents with     Diabetic Eye Exam Follow Up     Chief Complaint(s) and History of Present Illness(es)     Diabetic Eye Exam Follow Up     Vision: is stable    Diabetes Type: Type 2    Blood Sugars: fluctuates    Pain scale: 0/10              Comments     Supriya is here to continue care for Type 2 diabetes mellitus with mild nonproliferative retinopathy of both eyes and macular edema, unspecified whether long term insulin use (H). She feels vision is about the same as last visit.    Dixon Agustin COT 3:13 PM March 24, 2021

## 2021-03-24 NOTE — PROGRESS NOTES
CC: follow upDiabetic retinopathy   HPI: 71 year old here for NPDR and DME. VA stable      Imaging;    Optical Coherence Tomography: 3-24-21   right eye: microaneurysms and cystoid macular edema - improved  Left eye: mild retina irregularity; no cystic changes     fluorescein angiography: 10/28/20   Right eye: blockage of fluorescein angiography on the areas of heme; few microaneurysms; mild late Diabetic macular edema and PP leakage  No neovascularization elsewhere; no neovascularization of the disc.  Left eye: few microaneurysms; mild late Diabetic macular edema; staining of the peripheral Chorioretinal  scars    Assessment & Plan:  1. Moderate nonproliferative diabetic retinopathy and mild - Diabetic macular edema both eyes  - mild stable edema in right eye, stable in left eye   Stable- observe    3. right eye - retinal macroaneurysm   at the sup temp margin of disc may contribute to macular edema;   - MA seems to be getting thrombosed   - status post avastin inj x2 for cystoid macular edema with improvement  - Last DOROTEO 8/15/19 (#1)  - now stable mild cystoid macular edema- will observe. Consider avastin if worsen    4. Mod Hypertensive retinopathy   - Stage 5 kidney disease  - Considering HD   - blood pressure control has been poor in 160s/90s-100s  - Likely contributing to macular edema    5. Pseudophakia both eyes  8-11-20 right eye   Patient happy with her vision     6. History of Macula hole repair left eye by Dr. Daniel  S/p PPV, mp, air-fluid exchange, SF6 gas infusion, left eye 2/14/2012 by Dr. Daniel    Macula hole closed  Observe    7. Dry eye syndrome   Status post punctal plugs   artificial tears  As needed and warm compresses     PLAN:  - Blood pressure (<120/80) and blood glucose (HbA1c <7.0) control discussed with patient.   - Patient advised that failure to adequately control each may lead to vision loss. The patient expressed understanding.- improved on exam today   - follow up in 4 months with  Optical Coherence Tomography.     Ezra Chappell MD  Ophthalmology Resident, PGY-3    ~~~~~~~~~~~~~~~~~~~~~~~~~~~~~~~~~~   Complete documentation of historical and exam elements from today's encounter can be found in the full encounter summary report (not reduplicated in this progress note).  I personally obtained the chief complaint(s) and history of present illness.  I confirmed and edited as necessary the review of systems, past medical/surgical history, family history, social history, and examination findings as documented by others; and I examined the patient myself.  I personally reviewed the relevant tests, images, and reports as documented above.  I personally reviewed the ophthalmic test(s) associated with this encounter, agree with the interpretation(s) as documented by the resident/fellow, and have edited the corresponding report(s) as necessary.   I formulated and edited as necessary the assessment and plan and discussed the findings and management plan with the patient and family    Leanne Jett MD   of Ophthalmology.  Retina Service   Department of Ophthalmology and Visual Neurosciences   [facility]  Phone: [phone]   Fax: [phone]
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
## 2021-03-26 ENCOUNTER — OFFICE VISIT (OUTPATIENT)
Dept: DERMATOLOGY | Facility: CLINIC | Age: 71
End: 2021-03-26
Payer: MEDICARE

## 2021-03-26 DIAGNOSIS — M79.644 PAIN OF FINGER OF RIGHT HAND: ICD-10-CM

## 2021-03-26 DIAGNOSIS — L40.8 INVERSE PSORIASIS: ICD-10-CM

## 2021-03-26 PROCEDURE — 99214 OFFICE O/P EST MOD 30 MIN: CPT | Mod: GC | Performed by: DERMATOLOGY

## 2021-03-26 RX ORDER — NYSTATIN 100000 U/G
OINTMENT TOPICAL
Qty: 15 G | Refills: 1 | Status: SHIPPED | OUTPATIENT
Start: 2021-03-26 | End: 2022-01-28

## 2021-03-26 ASSESSMENT — PAIN SCALES - GENERAL: PAINLEVEL: MODERATE PAIN (5)
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
## 2021-03-26 NOTE — PATIENT INSTRUCTIONS
Examples of good moisturizers:   - Ointments: vaseline, Cerave healing ointment, Vaniply, coconut oil   - Creams: Cerave, Vanicream, Neutrogena Norwegian Formula Hand Cream

## 2021-03-26 NOTE — NURSING NOTE
Dermatology Rooming Note    Supriya Herr's goals for this visit include:   Chief Complaint   Patient presents with     Derm Problem     supriya is coming in today for a follow up on the right middle finger, states that a crust has formed around it, has not started taking the nystatin     Hilary Feng CMA on 3/26/2021 at 9:01 AM

## 2021-04-01 ENCOUNTER — PREP FOR PROCEDURE (OUTPATIENT)
Dept: TRANSPLANT | Facility: CLINIC | Age: 71
End: 2021-04-01

## 2021-04-01 ENCOUNTER — HOSPITAL ENCOUNTER (OUTPATIENT)
Facility: CLINIC | Age: 71
End: 2021-04-01
Attending: SURGERY | Admitting: SURGERY
Payer: MEDICARE

## 2021-04-01 DIAGNOSIS — Z11.59 ENCOUNTER FOR SCREENING FOR OTHER VIRAL DISEASES: ICD-10-CM

## 2021-04-01 DIAGNOSIS — N18.6 ESRD (END STAGE RENAL DISEASE) (H): ICD-10-CM

## 2021-04-01 DIAGNOSIS — N18.6 ESRD (END STAGE RENAL DISEASE) (H): Primary | ICD-10-CM

## 2021-04-01 DIAGNOSIS — T82.898A STEAL SYNDROME AS COMPLICATION OF DIALYSIS ACCESS (H): ICD-10-CM

## 2021-04-05 ENCOUNTER — TELEPHONE (OUTPATIENT)
Dept: FAMILY MEDICINE | Facility: CLINIC | Age: 71
End: 2021-04-05

## 2021-04-05 NOTE — TELEPHONE ENCOUNTER
Forms completed and faxed back to [facility] @ [phone]. Placed in abstraction to be scanned into patient's chart.    Lee Maguire, Patient Representative

## 2021-04-09 ENCOUNTER — OFFICE VISIT (OUTPATIENT)
Dept: FAMILY MEDICINE | Facility: CLINIC | Age: 71
End: 2021-04-09
Payer: MEDICARE

## 2021-04-09 ENCOUNTER — ANESTHESIA EVENT (OUTPATIENT)
Dept: SURGERY | Facility: CLINIC | Age: 71
End: 2021-04-09
Payer: MEDICARE

## 2021-04-09 VITALS
OXYGEN SATURATION: 99 % | HEART RATE: 59 BPM | BODY MASS INDEX: 32.71 KG/M2 | SYSTOLIC BLOOD PRESSURE: 136 MMHG | HEIGHT: 66 IN | WEIGHT: 203.5 LBS | TEMPERATURE: 97.5 F | DIASTOLIC BLOOD PRESSURE: 74 MMHG

## 2021-04-09 DIAGNOSIS — N18.6 ENCOUNTER REGARDING VASCULAR ACCESS FOR DIALYSIS FOR END-STAGE RENAL DISEASE (H): ICD-10-CM

## 2021-04-09 DIAGNOSIS — N18.6 TYPE 2 DIABETES MELLITUS WITH CHRONIC KIDNEY DISEASE ON CHRONIC DIALYSIS, WITH LONG-TERM CURRENT USE OF INSULIN (H): ICD-10-CM

## 2021-04-09 DIAGNOSIS — Z99.2 ENCOUNTER REGARDING VASCULAR ACCESS FOR DIALYSIS FOR END-STAGE RENAL DISEASE (H): ICD-10-CM

## 2021-04-09 DIAGNOSIS — E11.22 TYPE 2 DIABETES MELLITUS WITH CHRONIC KIDNEY DISEASE ON CHRONIC DIALYSIS, WITH LONG-TERM CURRENT USE OF INSULIN (H): ICD-10-CM

## 2021-04-09 DIAGNOSIS — Z79.4 TYPE 2 DIABETES MELLITUS WITH CHRONIC KIDNEY DISEASE ON CHRONIC DIALYSIS, WITH LONG-TERM CURRENT USE OF INSULIN (H): ICD-10-CM

## 2021-04-09 DIAGNOSIS — Z01.818 PREOP GENERAL PHYSICAL EXAM: Primary | ICD-10-CM

## 2021-04-09 DIAGNOSIS — Z01.818 PREOP GENERAL PHYSICAL EXAM: ICD-10-CM

## 2021-04-09 DIAGNOSIS — Z99.2 TYPE 2 DIABETES MELLITUS WITH CHRONIC KIDNEY DISEASE ON CHRONIC DIALYSIS, WITH LONG-TERM CURRENT USE OF INSULIN (H): ICD-10-CM

## 2021-04-09 DIAGNOSIS — Z11.59 ENCOUNTER FOR SCREENING FOR OTHER VIRAL DISEASES: ICD-10-CM

## 2021-04-09 LAB
ANION GAP SERPL CALCULATED.3IONS-SCNC: 13 MMOL/L (ref 3–14)
APTT PPP: 30 SEC (ref 22–37)
BUN SERPL-MCNC: 53 MG/DL (ref 7–30)
CALCIUM SERPL-MCNC: 9.1 MG/DL (ref 8.5–10.1)
CHLORIDE SERPL-SCNC: 101 MMOL/L (ref 94–109)
CO2 SERPL-SCNC: 22 MMOL/L (ref 20–32)
CREAT SERPL-MCNC: 5.28 MG/DL (ref 0.52–1.04)
ERYTHROCYTE [DISTWIDTH] IN BLOOD BY AUTOMATED COUNT: 12.5 % (ref 10–15)
GFR SERPL CREATININE-BSD FRML MDRD: 8 ML/MIN/{1.73_M2}
GLUCOSE SERPL-MCNC: 111 MG/DL (ref 70–99)
HBA1C MFR BLD: 6.2 % (ref 0–5.6)
HCT VFR BLD AUTO: 34.1 % (ref 35–47)
HGB BLD-MCNC: 11.3 G/DL (ref 11.7–15.7)
INR PPP: 1.14 (ref 0.86–1.14)
MCH RBC QN AUTO: 32.2 PG (ref 26.5–33)
MCHC RBC AUTO-ENTMCNC: 33.1 G/DL (ref 31.5–36.5)
MCV RBC AUTO: 97 FL (ref 78–100)
PLATELET # BLD AUTO: 192 10E9/L (ref 150–450)
POTASSIUM SERPL-SCNC: 3.9 MMOL/L (ref 3.4–5.3)
RBC # BLD AUTO: 3.51 10E12/L (ref 3.8–5.2)
SARS-COV-2 RNA RESP QL NAA+PROBE: NORMAL
SODIUM SERPL-SCNC: 136 MMOL/L (ref 133–144)
SPECIMEN SOURCE: NORMAL
WBC # BLD AUTO: 6.2 10E9/L (ref 4–11)

## 2021-04-09 PROCEDURE — 85730 THROMBOPLASTIN TIME PARTIAL: CPT | Performed by: FAMILY MEDICINE

## 2021-04-09 PROCEDURE — 99214 OFFICE O/P EST MOD 30 MIN: CPT | Performed by: FAMILY MEDICINE

## 2021-04-09 PROCEDURE — 85610 PROTHROMBIN TIME: CPT | Performed by: FAMILY MEDICINE

## 2021-04-09 PROCEDURE — 85027 COMPLETE CBC AUTOMATED: CPT | Performed by: FAMILY MEDICINE

## 2021-04-09 PROCEDURE — U0003 INFECTIOUS AGENT DETECTION BY NUCLEIC ACID (DNA OR RNA); SEVERE ACUTE RESPIRATORY SYNDROME CORONAVIRUS 2 (SARS-COV-2) (CORONAVIRUS DISEASE [COVID-19]), AMPLIFIED PROBE TECHNIQUE, MAKING USE OF HIGH THROUGHPUT TECHNOLOGIES AS DESCRIBED BY CMS-2020-01-R: HCPCS | Performed by: SURGERY

## 2021-04-09 PROCEDURE — 80048 BASIC METABOLIC PNL TOTAL CA: CPT | Performed by: FAMILY MEDICINE

## 2021-04-09 PROCEDURE — 36415 COLL VENOUS BLD VENIPUNCTURE: CPT | Performed by: FAMILY MEDICINE

## 2021-04-09 PROCEDURE — 93000 ELECTROCARDIOGRAM COMPLETE: CPT | Performed by: FAMILY MEDICINE

## 2021-04-09 PROCEDURE — U0005 INFEC AGEN DETEC AMPLI PROBE: HCPCS | Performed by: SURGERY

## 2021-04-09 PROCEDURE — 83036 HEMOGLOBIN GLYCOSYLATED A1C: CPT | Performed by: FAMILY MEDICINE

## 2021-04-09 RX ORDER — NALOXONE HYDROCHLORIDE 0.4 MG/ML
0.2 INJECTION, SOLUTION INTRAMUSCULAR; INTRAVENOUS; SUBCUTANEOUS
Status: CANCELLED | OUTPATIENT
Start: 2021-04-09

## 2021-04-09 RX ORDER — FENTANYL CITRATE 50 UG/ML
25-50 INJECTION, SOLUTION INTRAMUSCULAR; INTRAVENOUS
Status: CANCELLED | OUTPATIENT
Start: 2021-04-09

## 2021-04-09 RX ORDER — FLUMAZENIL 0.1 MG/ML
0.2 INJECTION, SOLUTION INTRAVENOUS
Status: CANCELLED | OUTPATIENT
Start: 2021-04-09

## 2021-04-09 RX ORDER — NALOXONE HYDROCHLORIDE 0.4 MG/ML
0.4 INJECTION, SOLUTION INTRAMUSCULAR; INTRAVENOUS; SUBCUTANEOUS
Status: CANCELLED | OUTPATIENT
Start: 2021-04-09

## 2021-04-09 RX ORDER — CLINDAMYCIN PHOSPHATE 900 MG/50ML
900 INJECTION, SOLUTION INTRAVENOUS
Status: CANCELLED | OUTPATIENT
Start: 2021-04-12

## 2021-04-09 ASSESSMENT — MIFFLIN-ST. JEOR: SCORE: 1449.82

## 2021-04-09 NOTE — PATIENT INSTRUCTIONS

## 2021-04-09 NOTE — OR NURSING
PreOp phone call - pt/family concerns  Received: Today  Message Contents   Silva Watson RN Ramanathan, Karthik Vishwanathan, MD; Damaris Molina APRN CNS             Dear Dr. Banks & team,     I just completed this pt's PreOp phone call. Of note:     1. Pt's daughter & caretaker Caroline would like to confirm the plan for after surgery - will patient be staying overnight? Currently case is listed as Same Day Surgery.     2. Daughter describes increased care burden and notes some caretaker fatigue due to mother's increased dependence and worsening physical condition, as well as notable memory changes in last month. I encouraged her to speak with PCP Dr. Jackson, but she is also interested in talking with you about home nursing care or support options (respite care) - could social work/care coordination please speak with Caroline during this visit?       Thank you for your review,     Ethel Watson RN   PreAdmission Screening   [facility]
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
## 2021-04-10 ENCOUNTER — TELEPHONE (OUTPATIENT)
Dept: LAB | Facility: CLINIC | Age: 71
End: 2021-04-10

## 2021-04-10 LAB
LABORATORY COMMENT REPORT: ABNORMAL
SARS-COV-2 RNA RESP QL NAA+PROBE: POSITIVE
SPECIMEN SOURCE: ABNORMAL

## 2021-04-11 NOTE — TELEPHONE ENCOUNTER
"-Coronavirus (COVID-19) Notification    Caller Name (Patient, parent, daughter/son, grandparent, etc)    Reason for call  Notify of Positive Coronavirus (COVID-19) lab results, assess symptoms,  review  [facility] recommendations    Lab Result    Lab test:  2019-nCoV rRt-PCR or SARS-CoV-2 PCR    Oropharyngeal AND/OR nasopharyngeal swabs is POSITIVE for 2019-nCoV RNA/SARS-COV-2 PCR (COVID-19 virus)    RN Recommendations/Instructions per [facility] Coronavirus COVID-19 recommendations    Brief introduction script  Introduce self then review script:  \"I am calling on behalf of [employer].  We were notified that your Coronavirus test (COVID-19) for was POSITIVE for the virus.  I have some information to relay to you but first I wanted to mention that the [place] Dept of Health will be contacting you shortly [it's possible MD already called Patient] to talk to you more about how you are feeling and other people you have had contact with who might now also have the virus.  Also,  [employer] [facility] is Partnering with the [facility] for Covid-19 research, you may be contacted directly by research staff.\"    Assessment (Inquire about Patient's current symptoms)   Assessment   Current Symptoms at time of phone call: (if no symptoms, document No symptoms] Nasal congestion   Symptoms onset (if applicable) 4/9/2021     If at time of call, Patients symptoms hare worsened, the Patient should contact 911 or have someone drive them to Emergency Dept promptly:      If Patient calling 911, inform 911 personal that you have tested positive for the Coronavirus (COVID-19).  Place mask on and await 911 to arrive.    If Emergency Dept, If possible, please have another adult drive you to the Emergency Dept but you need to wear mask when in contact with other people.      Monoclonal Antibody Administration    You may be eligible to receive a new treatment with a monoclonal antibody for preventing hospitalization in " "patients at high risk for complications from COVID-19.   This medication is still experimental and available on a limited basis; it is given through an IV and must be given at an infusion center. Please note that not all people who are eligible will receive the medication since it is in limited supply.     Are you interested in being considered for this medication?  No.   Does the patient fit the criteria: Patient declined    If patient qualifies based on above criteria:  \"You will be contacted if you are selected to receive this treatment in the next 1-2 business days.   This is time sensitive and if you are not selected in the next 1-2 business days, you will not receive the medication.  If you do not receive a call to schedule, you have not been selected.\"      Review information with Patient    Your result was positive. This means you have COVID-19 (coronavirus).  We have sent you a letter that reviews the information that I'll be reviewing with you now.    How can I protect others?    If you have symptoms: stay home and away from others (self-isolate) until:    You've had no fever--and no medicine that reduces fever--for 1 full day (24 hours). And       Your other symptoms have gotten better. For example, your cough or breathing has improved. And     At least 10 days have passed since your symptoms started. (If you've been told by a doctor that you have a weak immune system, wait 20 days.)     If you don't have symptoms: Stay home and away from others (self-isolate) until at least 10 days have passed since your first positive COVID-19 test. (Date test collected)    During this time:    Stay in your own room, including for meals. Use your own bathroom if you can.    Stay away from others in your home. No hugging, kissing or shaking hands. No visitors.     Don't go to work, school or anywhere else.     Clean  high touch  surfaces often (doorknobs, counters, handles, etc.). Use a household cleaning spray or wipes. " You'll find a full list on the EPA website at www.epa.gov/pesticide-registration/list-n-disinfectants-use-against-sars-cov-2.     Cover your mouth and nose with a mask, tissue or other face covering to avoid spreading germs.    Wash your hands and face often with soap and water.    Make a list of people you have been in close contact with recently, even if either of you wore a face covering.   ; Start your list from 2 days before you became ill or had a positive test.  ; Include anyone that was within 6 feet of you for a cumulative total of 15 minutes or more in 24 hours. (Example: if you sat next to Saulo for 5 minutes in the morning and 10 minutes in the afternoon, then you were in close contact for 15 minutes total that day. Saulo would be added to your list.)    A public health worker will call or text you. It is important that you answer. They will ask you questions about possible exposures to COVID-19, such as people you have been in direct contact with and places you have visited.    Tell the people on your list that you have COVID-19; they should stay away from others for 14 days starting from the last time they were in contact with you (unless you are told something different from a public health worker).     Caregivers in these groups are at risk for severe illness due to COVID-19:  o People 65 years and older  o People who live in a nursing home or long-term care facility  o People with chronic disease (lung, heart, cancer, diabetes, kidney, liver, immunologic)  o People who have a weakened immune system, including those who:  - Are in cancer treatment  - Take medicine that weakens the immune system, such as corticosteroids  - Had a bone marrow or organ transplant  - Have an immune deficiency  - Have poorly controlled HIV or AIDS  - Are obese (body mass index of 40 or higher)  - Smoke regularly    Caregivers should wear gloves while washing dishes, handling laundry and cleaning bedrooms and  bathrooms.    Wash and dry laundry with special caution. Don't shake dirty laundry, and use the warmest water setting you can.    If you have a weakened immune system, ask your doctor about other actions you should take.    For more tips, go to www.cdc.gov/coronavirus/2019-ncov/downloads/10Things.pdf.    You should not go back to work until you meet the guidelines above for ending your home isolation. You don't need to be retested for COVID-19 before going back to work--studies show that you won't spread the virus if it's been at least 10 days since your symptoms started (or 20 days, if you have a weak immune system).    Employers: This document serves as formal notice of your employee's medical guidelines for going back to work. They must meet the above guidelines before going back to work in person.    How can I take care of myself?    1. Get lots of rest. Drink extra fluids (unless a doctor has told you not to).    2. Take Tylenol (acetaminophen) for fever or pain. If you have liver or kidney problems, ask your family doctor if it's okay to take Tylenol.     Take either:     650 mg (two 325 mg pills) every 4 to 6 hours, or     1,000 mg (two 500 mg pills) every 8 hours as needed.     Note: Don't take more than 3,000 mg in one day. Acetaminophen is found in many medicines (both prescribed and over-the-counter medicines). Read all labels to be sure you don't take too much.    For children, check the Tylenol bottle for the right dose (based on their age or weight).    3. If you have other health problems (like cancer, heart failure, an organ transplant or severe kidney disease): Call your specialty clinic if you don't feel better in the next 2 days.    4. Know when to call 911: Emergency warning signs include:    Trouble breathing or shortness of breath    Pain or pressure in the chest that doesn't go away    Feeling confused like you haven't felt before, or not being able to wake up    Bluish-colored lips or  face    5. Sign up for [facility]. We know it's scary to hear that you have COVID-19. We want to track your symptoms to make sure you're okay over the next 2 weeks. Please look for an email from [facility]--this is a free, online program that we'll use to keep in touch. To sign up, follow the link in the email. Learn more at [email or web address].    Where can I get more information?    [facility]: [email or web address]    Coronavirus Basics: www.health.[facility].[place]./diseases/coronavirus/basics.html    What to Do If You're Sick: www.cdc.gov/coronavirus/2019-ncov/about/steps-when-sick.html    Ending Home Isolation: www.cdc.gov/coronavirus/2019-ncov/hcp/disposition-in-home-patients.html     Caring for Someone with COVID-19: www.cdc.gov/coronavirus/2019-ncov/if-you-are-sick/care-for-someone.html     [facility] clinical trials (COVID-19 research studies): clinicalaffairs.[facility]-clinical-trials     A Positive COVID-19 letter will be sent via [employer] or the mail. (Exception, no letters sent to Presurgerical/Preprocedure Patients)    Jalyn Nicole LPN

## 2021-04-12 ENCOUNTER — MYC MEDICAL ADVICE (OUTPATIENT)
Dept: FAMILY MEDICINE | Facility: CLINIC | Age: 71
End: 2021-04-12

## 2021-04-12 ENCOUNTER — ANESTHESIA (OUTPATIENT)
Dept: SURGERY | Facility: CLINIC | Age: 71
End: 2021-04-12
Payer: MEDICARE

## 2021-04-12 DIAGNOSIS — Z01.818 PREOP TESTING: ICD-10-CM

## 2021-04-12 DIAGNOSIS — N18.6 ESRD ON DIALYSIS (H): Primary | ICD-10-CM

## 2021-04-12 DIAGNOSIS — Z99.2 ESRD ON DIALYSIS (H): Primary | ICD-10-CM

## 2021-04-13 ENCOUNTER — PREP FOR PROCEDURE (OUTPATIENT)
Dept: TRANSPLANT | Facility: CLINIC | Age: 71
End: 2021-04-13

## 2021-04-13 ENCOUNTER — TELEPHONE (OUTPATIENT)
Dept: DERMATOLOGY | Facility: CLINIC | Age: 71
End: 2021-04-13

## 2021-04-13 DIAGNOSIS — N18.6 ESRD (END STAGE RENAL DISEASE) (H): Primary | ICD-10-CM

## 2021-04-13 DIAGNOSIS — Z99.2 ESRD ON DIALYSIS (H): Primary | ICD-10-CM

## 2021-04-13 DIAGNOSIS — T82.898A STEAL SYNDROME AS COMPLICATION OF DIALYSIS ACCESS (H): ICD-10-CM

## 2021-04-13 DIAGNOSIS — Z01.818 PREOP TESTING: ICD-10-CM

## 2021-04-13 DIAGNOSIS — N18.6 ESRD ON DIALYSIS (H): Primary | ICD-10-CM

## 2021-04-13 NOTE — TELEPHONE ENCOUNTER
Patient daughter is sending over a detail message due to her mother. Caroline (Daughter) had to canceled her appointment on Friday 4/16/21 with Dr. Herrera due her getting surgery on her finger, Caroline anted pt to be seen sooner then next available.    Sherita Jeffrey 4/13/21

## 2021-04-14 DIAGNOSIS — Z99.2 ESRD ON DIALYSIS (H): ICD-10-CM

## 2021-04-14 DIAGNOSIS — Z01.818 PREOP TESTING: ICD-10-CM

## 2021-04-14 DIAGNOSIS — N18.6 ESRD ON DIALYSIS (H): ICD-10-CM

## 2021-04-14 LAB
LABORATORY COMMENT REPORT: NORMAL
SARS-COV-2 RNA RESP QL NAA+PROBE: NEGATIVE
SARS-COV-2 RNA RESP QL NAA+PROBE: NORMAL
SPECIMEN SOURCE: NORMAL
SPECIMEN SOURCE: NORMAL

## 2021-04-14 PROCEDURE — U0005 INFEC AGEN DETEC AMPLI PROBE: HCPCS | Performed by: CLINICAL NURSE SPECIALIST

## 2021-04-14 PROCEDURE — U0003 INFECTIOUS AGENT DETECTION BY NUCLEIC ACID (DNA OR RNA); SEVERE ACUTE RESPIRATORY SYNDROME CORONAVIRUS 2 (SARS-COV-2) (CORONAVIRUS DISEASE [COVID-19]), AMPLIFIED PROBE TECHNIQUE, MAKING USE OF HIGH THROUGHPUT TECHNOLOGIES AS DESCRIBED BY CMS-2020-01-R: HCPCS | Performed by: CLINICAL NURSE SPECIALIST

## 2021-04-15 ASSESSMENT — LIFESTYLE VARIABLES: TOBACCO_USE: 1
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
## 2021-04-16 ENCOUNTER — HOSPITAL ENCOUNTER (OUTPATIENT)
Facility: CLINIC | Age: 71
Discharge: HOME | End: 2021-04-17
Attending: SURGERY | Admitting: SURGERY
Payer: MEDICARE

## 2021-04-16 DIAGNOSIS — T82.898A STEAL SYNDROME AS COMPLICATION OF DIALYSIS ACCESS (H): ICD-10-CM

## 2021-04-16 DIAGNOSIS — N18.6 ESRD (END STAGE RENAL DISEASE) (H): Primary | ICD-10-CM

## 2021-04-16 LAB
APTT PPP: 28 SEC (ref 22–37)
BASOPHILS # BLD AUTO: 0 10E9/L (ref 0–0.2)
BASOPHILS NFR BLD AUTO: 0.5 %
CREAT SERPL-MCNC: 4.35 MG/DL (ref 0.52–1.04)
DIFFERENTIAL METHOD BLD: ABNORMAL
EOSINOPHIL # BLD AUTO: 0.2 10E9/L (ref 0–0.7)
EOSINOPHIL NFR BLD AUTO: 3 %
ERYTHROCYTE [DISTWIDTH] IN BLOOD BY AUTOMATED COUNT: 12.7 % (ref 10–15)
GFR SERPL CREATININE-BSD FRML MDRD: 9 ML/MIN/{1.73_M2}
GLUCOSE BLDC GLUCOMTR-MCNC: 139 MG/DL (ref 70–99)
GLUCOSE BLDC GLUCOMTR-MCNC: 144 MG/DL (ref 70–99)
GLUCOSE BLDC GLUCOMTR-MCNC: 157 MG/DL (ref 70–99)
GLUCOSE BLDC GLUCOMTR-MCNC: 213 MG/DL (ref 70–99)
GLUCOSE BLDC GLUCOMTR-MCNC: 291 MG/DL (ref 70–99)
GLUCOSE SERPL-MCNC: 155 MG/DL (ref 70–99)
HCT VFR BLD AUTO: 33.6 % (ref 35–47)
HGB BLD-MCNC: 10.9 G/DL (ref 11.7–15.7)
IMM GRANULOCYTES # BLD: 0 10E9/L (ref 0–0.4)
IMM GRANULOCYTES NFR BLD: 0.3 %
INR PPP: 1.14 (ref 0.86–1.14)
LYMPHOCYTES # BLD AUTO: 1.8 10E9/L (ref 0.8–5.3)
LYMPHOCYTES NFR BLD AUTO: 28.2 %
MCH RBC QN AUTO: 31.9 PG (ref 26.5–33)
MCHC RBC AUTO-ENTMCNC: 32.4 G/DL (ref 31.5–36.5)
MCV RBC AUTO: 98 FL (ref 78–100)
MONOCYTES # BLD AUTO: 0.7 10E9/L (ref 0–1.3)
MONOCYTES NFR BLD AUTO: 11.3 %
NEUTROPHILS # BLD AUTO: 3.6 10E9/L (ref 1.6–8.3)
NEUTROPHILS NFR BLD AUTO: 56.7 %
NRBC # BLD AUTO: 0 10*3/UL
NRBC BLD AUTO-RTO: 0 /100
PLATELET # BLD AUTO: 194 10E9/L (ref 150–450)
POTASSIUM SERPL-SCNC: 3.8 MMOL/L (ref 3.4–5.3)
RBC # BLD AUTO: 3.42 10E12/L (ref 3.8–5.2)
WBC # BLD AUTO: 6.4 10E9/L (ref 4–11)

## 2021-04-16 PROCEDURE — 250N000011 HC RX IP 250 OP 636: Performed by: NURSE ANESTHETIST, CERTIFIED REGISTERED

## 2021-04-16 PROCEDURE — 82947 ASSAY GLUCOSE BLOOD QUANT: CPT | Performed by: SURGERY

## 2021-04-16 PROCEDURE — 250N000009 HC RX 250: Performed by: STUDENT IN AN ORGANIZED HEALTH CARE EDUCATION/TRAINING PROGRAM

## 2021-04-16 PROCEDURE — 250N000009 HC RX 250: Performed by: NURSE ANESTHETIST, CERTIFIED REGISTERED

## 2021-04-16 PROCEDURE — 84132 ASSAY OF SERUM POTASSIUM: CPT | Performed by: SURGERY

## 2021-04-16 PROCEDURE — 36415 COLL VENOUS BLD VENIPUNCTURE: CPT | Performed by: SURGERY

## 2021-04-16 PROCEDURE — 258N000003 HC RX IP 258 OP 636: Performed by: STUDENT IN AN ORGANIZED HEALTH CARE EDUCATION/TRAINING PROGRAM

## 2021-04-16 PROCEDURE — 258N000003 HC RX IP 258 OP 636: Performed by: NURSE ANESTHETIST, CERTIFIED REGISTERED

## 2021-04-16 PROCEDURE — 250N000013 HC RX MED GY IP 250 OP 250 PS 637: Performed by: SURGERY

## 2021-04-16 PROCEDURE — 710N000010 HC RECOVERY PHASE 1, LEVEL 2, PER MIN: Performed by: SURGERY

## 2021-04-16 PROCEDURE — 999N000141 HC STATISTIC PRE-PROCEDURE NURSING ASSESSMENT: Performed by: SURGERY

## 2021-04-16 PROCEDURE — 82565 ASSAY OF CREATININE: CPT | Performed by: SURGERY

## 2021-04-16 PROCEDURE — 85730 THROMBOPLASTIN TIME PARTIAL: CPT | Performed by: SURGERY

## 2021-04-16 PROCEDURE — 250N000012 HC RX MED GY IP 250 OP 636 PS 637: Performed by: SURGERY

## 2021-04-16 PROCEDURE — 85025 COMPLETE CBC W/AUTO DIFF WBC: CPT | Performed by: SURGERY

## 2021-04-16 PROCEDURE — 999N001017 HC STATISTIC GLUCOSE BY METER IP

## 2021-04-16 PROCEDURE — 96372 THER/PROPH/DIAG INJ SC/IM: CPT | Mod: 59 | Performed by: SURGERY

## 2021-04-16 PROCEDURE — 360N000076 HC SURGERY LEVEL 3, PER MIN: Performed by: SURGERY

## 2021-04-16 PROCEDURE — 85610 PROTHROMBIN TIME: CPT | Performed by: SURGERY

## 2021-04-16 PROCEDURE — 250N000011 HC RX IP 250 OP 636: Performed by: SURGERY

## 2021-04-16 PROCEDURE — 258N000003 HC RX IP 258 OP 636: Performed by: SURGERY

## 2021-04-16 PROCEDURE — 250N000025 HC SEVOFLURANE, PER MIN: Performed by: SURGERY

## 2021-04-16 PROCEDURE — 250N000009 HC RX 250: Performed by: SURGERY

## 2021-04-16 PROCEDURE — 272N000001 HC OR GENERAL SUPPLY STERILE: Performed by: SURGERY

## 2021-04-16 PROCEDURE — 120N000002 HC R&B MED SURG/OB UMMC

## 2021-04-16 PROCEDURE — 250N000011 HC RX IP 250 OP 636: Performed by: STUDENT IN AN ORGANIZED HEALTH CARE EDUCATION/TRAINING PROGRAM

## 2021-04-16 PROCEDURE — 370N000017 HC ANESTHESIA TECHNICAL FEE, PER MIN: Performed by: SURGERY

## 2021-04-16 RX ORDER — NYSTATIN 100000 U/G
OINTMENT TOPICAL 2 TIMES DAILY
Status: DISCONTINUED | OUTPATIENT
Start: 2021-04-16 | End: 2021-04-17 | Stop reason: HOSPADM

## 2021-04-16 RX ORDER — NALOXONE HYDROCHLORIDE 0.4 MG/ML
0.4 INJECTION, SOLUTION INTRAMUSCULAR; INTRAVENOUS; SUBCUTANEOUS
Status: DISCONTINUED | OUTPATIENT
Start: 2021-04-16 | End: 2021-04-16

## 2021-04-16 RX ORDER — POLYETHYLENE GLYCOL 3350 17 G/17G
17 POWDER, FOR SOLUTION ORAL DAILY
Status: DISCONTINUED | OUTPATIENT
Start: 2021-04-17 | End: 2021-04-17 | Stop reason: HOSPADM

## 2021-04-16 RX ORDER — NALOXONE HYDROCHLORIDE 0.4 MG/ML
0.2 INJECTION, SOLUTION INTRAMUSCULAR; INTRAVENOUS; SUBCUTANEOUS
Status: DISCONTINUED | OUTPATIENT
Start: 2021-04-16 | End: 2021-04-16 | Stop reason: HOSPADM

## 2021-04-16 RX ORDER — ACETAMINOPHEN 325 MG/1
975 TABLET ORAL EVERY 8 HOURS
Status: DISCONTINUED | OUTPATIENT
Start: 2021-04-16 | End: 2021-04-17 | Stop reason: HOSPADM

## 2021-04-16 RX ORDER — FUROSEMIDE 40 MG
80 TABLET ORAL 2 TIMES DAILY
Status: DISCONTINUED | OUTPATIENT
Start: 2021-04-16 | End: 2021-04-17 | Stop reason: HOSPADM

## 2021-04-16 RX ORDER — SEVELAMER CARBONATE 800 MG/1
800 TABLET, FILM COATED ORAL
Status: DISCONTINUED | OUTPATIENT
Start: 2021-04-16 | End: 2021-04-17 | Stop reason: HOSPADM

## 2021-04-16 RX ORDER — NALOXONE HYDROCHLORIDE 0.4 MG/ML
0.2 INJECTION, SOLUTION INTRAMUSCULAR; INTRAVENOUS; SUBCUTANEOUS
Status: DISCONTINUED | OUTPATIENT
Start: 2021-04-16 | End: 2021-04-17 | Stop reason: HOSPADM

## 2021-04-16 RX ORDER — GABAPENTIN 100 MG/1
100 CAPSULE ORAL 3 TIMES DAILY PRN
Status: DISCONTINUED | OUTPATIENT
Start: 2021-04-16 | End: 2021-04-17 | Stop reason: HOSPADM

## 2021-04-16 RX ORDER — HYDROMORPHONE HCL IN WATER/PF 6 MG/30 ML
0.2 PATIENT CONTROLLED ANALGESIA SYRINGE INTRAVENOUS
Status: DISCONTINUED | OUTPATIENT
Start: 2021-04-16 | End: 2021-04-17 | Stop reason: HOSPADM

## 2021-04-16 RX ORDER — EPHEDRINE SULFATE 50 MG/ML
INJECTION, SOLUTION INTRAMUSCULAR; INTRAVENOUS; SUBCUTANEOUS PRN
Status: DISCONTINUED | OUTPATIENT
Start: 2021-04-16 | End: 2021-04-16

## 2021-04-16 RX ORDER — NALOXONE HYDROCHLORIDE 0.4 MG/ML
0.2 INJECTION, SOLUTION INTRAMUSCULAR; INTRAVENOUS; SUBCUTANEOUS
Status: DISCONTINUED | OUTPATIENT
Start: 2021-04-16 | End: 2021-04-16

## 2021-04-16 RX ORDER — DEXTROSE MONOHYDRATE 25 G/50ML
25-50 INJECTION, SOLUTION INTRAVENOUS
Status: DISCONTINUED | OUTPATIENT
Start: 2021-04-16 | End: 2021-04-17 | Stop reason: HOSPADM

## 2021-04-16 RX ORDER — ALBUTEROL SULFATE 90 UG/1
2 AEROSOL, METERED RESPIRATORY (INHALATION) EVERY 6 HOURS PRN
Status: DISCONTINUED | OUTPATIENT
Start: 2021-04-16 | End: 2021-04-17 | Stop reason: HOSPADM

## 2021-04-16 RX ORDER — NICOTINE POLACRILEX 4 MG
15-30 LOZENGE BUCCAL
Status: DISCONTINUED | OUTPATIENT
Start: 2021-04-16 | End: 2021-04-16

## 2021-04-16 RX ORDER — PROPOFOL 10 MG/ML
INJECTION, EMULSION INTRAVENOUS PRN
Status: DISCONTINUED | OUTPATIENT
Start: 2021-04-16 | End: 2021-04-16

## 2021-04-16 RX ORDER — ONDANSETRON 4 MG/1
4 TABLET, ORALLY DISINTEGRATING ORAL EVERY 30 MIN PRN
Status: DISCONTINUED | OUTPATIENT
Start: 2021-04-16 | End: 2021-04-16 | Stop reason: HOSPADM

## 2021-04-16 RX ORDER — PROPOFOL 10 MG/ML
INJECTION, EMULSION INTRAVENOUS CONTINUOUS PRN
Status: DISCONTINUED | OUTPATIENT
Start: 2021-04-16 | End: 2021-04-16

## 2021-04-16 RX ORDER — BUPIVACAINE HYDROCHLORIDE 5 MG/ML
INJECTION, SOLUTION EPIDURAL; INTRACAUDAL PRN
Status: DISCONTINUED | OUTPATIENT
Start: 2021-04-16 | End: 2021-04-16

## 2021-04-16 RX ORDER — CLINDAMYCIN PHOSPHATE 900 MG/50ML
900 INJECTION, SOLUTION INTRAVENOUS
Status: COMPLETED | OUTPATIENT
Start: 2021-04-16 | End: 2021-04-16

## 2021-04-16 RX ORDER — SODIUM CHLORIDE, SODIUM LACTATE, POTASSIUM CHLORIDE, CALCIUM CHLORIDE 600; 310; 30; 20 MG/100ML; MG/100ML; MG/100ML; MG/100ML
INJECTION, SOLUTION INTRAVENOUS CONTINUOUS
Status: DISCONTINUED | OUTPATIENT
Start: 2021-04-16 | End: 2021-04-16 | Stop reason: HOSPADM

## 2021-04-16 RX ORDER — DEXAMETHASONE SODIUM PHOSPHATE 4 MG/ML
INJECTION, SOLUTION INTRA-ARTICULAR; INTRALESIONAL; INTRAMUSCULAR; INTRAVENOUS; SOFT TISSUE PRN
Status: DISCONTINUED | OUTPATIENT
Start: 2021-04-16 | End: 2021-04-16

## 2021-04-16 RX ORDER — ONDANSETRON 4 MG/1
4 TABLET, ORALLY DISINTEGRATING ORAL EVERY 6 HOURS PRN
Status: DISCONTINUED | OUTPATIENT
Start: 2021-04-16 | End: 2021-04-17 | Stop reason: HOSPADM

## 2021-04-16 RX ORDER — AMOXICILLIN 250 MG
1 CAPSULE ORAL 2 TIMES DAILY
Status: DISCONTINUED | OUTPATIENT
Start: 2021-04-16 | End: 2021-04-17 | Stop reason: HOSPADM

## 2021-04-16 RX ORDER — LIDOCAINE HYDROCHLORIDE 20 MG/ML
INJECTION, SOLUTION INFILTRATION; PERINEURAL PRN
Status: DISCONTINUED | OUTPATIENT
Start: 2021-04-16 | End: 2021-04-16

## 2021-04-16 RX ORDER — SODIUM CHLORIDE 9 MG/ML
INJECTION, SOLUTION INTRAVENOUS CONTINUOUS
Status: DISCONTINUED | OUTPATIENT
Start: 2021-04-16 | End: 2021-04-16 | Stop reason: HOSPADM

## 2021-04-16 RX ORDER — AMLODIPINE BESYLATE 5 MG/1
5 TABLET ORAL 2 TIMES DAILY
Status: DISCONTINUED | OUTPATIENT
Start: 2021-04-16 | End: 2021-04-17 | Stop reason: HOSPADM

## 2021-04-16 RX ORDER — FENTANYL CITRATE 50 UG/ML
INJECTION, SOLUTION INTRAMUSCULAR; INTRAVENOUS PRN
Status: DISCONTINUED | OUTPATIENT
Start: 2021-04-16 | End: 2021-04-16

## 2021-04-16 RX ORDER — LIDOCAINE 40 MG/G
CREAM TOPICAL
Status: DISCONTINUED | OUTPATIENT
Start: 2021-04-16 | End: 2021-04-16 | Stop reason: HOSPADM

## 2021-04-16 RX ORDER — ONDANSETRON 2 MG/ML
4 INJECTION INTRAMUSCULAR; INTRAVENOUS EVERY 6 HOURS PRN
Status: DISCONTINUED | OUTPATIENT
Start: 2021-04-16 | End: 2021-04-17 | Stop reason: HOSPADM

## 2021-04-16 RX ORDER — NICOTINE POLACRILEX 4 MG
15-30 LOZENGE BUCCAL
Status: DISCONTINUED | OUTPATIENT
Start: 2021-04-16 | End: 2021-04-17 | Stop reason: HOSPADM

## 2021-04-16 RX ORDER — NALOXONE HYDROCHLORIDE 0.4 MG/ML
0.4 INJECTION, SOLUTION INTRAMUSCULAR; INTRAVENOUS; SUBCUTANEOUS
Status: DISCONTINUED | OUTPATIENT
Start: 2021-04-16 | End: 2021-04-16 | Stop reason: HOSPADM

## 2021-04-16 RX ORDER — FENTANYL CITRATE 50 UG/ML
25-50 INJECTION, SOLUTION INTRAMUSCULAR; INTRAVENOUS
Status: DISCONTINUED | OUTPATIENT
Start: 2021-04-16 | End: 2021-04-16 | Stop reason: HOSPADM

## 2021-04-16 RX ORDER — FLUMAZENIL 0.1 MG/ML
0.2 INJECTION, SOLUTION INTRAVENOUS
Status: DISCONTINUED | OUTPATIENT
Start: 2021-04-16 | End: 2021-04-16 | Stop reason: HOSPADM

## 2021-04-16 RX ORDER — CARVEDILOL 25 MG/1
25 TABLET ORAL 2 TIMES DAILY WITH MEALS
Status: DISCONTINUED | OUTPATIENT
Start: 2021-04-16 | End: 2021-04-17 | Stop reason: HOSPADM

## 2021-04-16 RX ORDER — BUPIVACAINE HYDROCHLORIDE 2.5 MG/ML
INJECTION, SOLUTION EPIDURAL; INFILTRATION; INTRACAUDAL PRN
Status: DISCONTINUED | OUTPATIENT
Start: 2021-04-16 | End: 2021-04-16

## 2021-04-16 RX ORDER — NALOXONE HYDROCHLORIDE 0.4 MG/ML
0.4 INJECTION, SOLUTION INTRAMUSCULAR; INTRAVENOUS; SUBCUTANEOUS
Status: DISCONTINUED | OUTPATIENT
Start: 2021-04-16 | End: 2021-04-17 | Stop reason: HOSPADM

## 2021-04-16 RX ORDER — BISACODYL 10 MG
10 SUPPOSITORY, RECTAL RECTAL DAILY PRN
Status: DISCONTINUED | OUTPATIENT
Start: 2021-04-16 | End: 2021-04-17 | Stop reason: HOSPADM

## 2021-04-16 RX ORDER — HYDROMORPHONE HYDROCHLORIDE 1 MG/ML
.3-.5 INJECTION, SOLUTION INTRAMUSCULAR; INTRAVENOUS; SUBCUTANEOUS EVERY 5 MIN PRN
Status: DISCONTINUED | OUTPATIENT
Start: 2021-04-16 | End: 2021-04-16 | Stop reason: HOSPADM

## 2021-04-16 RX ORDER — DEXTROSE MONOHYDRATE 25 G/50ML
25-50 INJECTION, SOLUTION INTRAVENOUS
Status: DISCONTINUED | OUTPATIENT
Start: 2021-04-16 | End: 2021-04-16

## 2021-04-16 RX ORDER — ONDANSETRON 2 MG/ML
INJECTION INTRAMUSCULAR; INTRAVENOUS PRN
Status: DISCONTINUED | OUTPATIENT
Start: 2021-04-16 | End: 2021-04-16

## 2021-04-16 RX ORDER — PROCHLORPERAZINE MALEATE 5 MG
5 TABLET ORAL EVERY 6 HOURS PRN
Status: DISCONTINUED | OUTPATIENT
Start: 2021-04-16 | End: 2021-04-17 | Stop reason: HOSPADM

## 2021-04-16 RX ORDER — DOCUSATE SODIUM 100 MG/1
100 CAPSULE, LIQUID FILLED ORAL 2 TIMES DAILY
Status: DISCONTINUED | OUTPATIENT
Start: 2021-04-16 | End: 2021-04-17 | Stop reason: HOSPADM

## 2021-04-16 RX ORDER — ACETAMINOPHEN 325 MG/1
650 TABLET ORAL EVERY 4 HOURS PRN
Status: DISCONTINUED | OUTPATIENT
Start: 2021-04-19 | End: 2021-04-17 | Stop reason: HOSPADM

## 2021-04-16 RX ORDER — GLYCOPYRROLATE 0.2 MG/ML
INJECTION, SOLUTION INTRAMUSCULAR; INTRAVENOUS PRN
Status: DISCONTINUED | OUTPATIENT
Start: 2021-04-16 | End: 2021-04-16

## 2021-04-16 RX ORDER — ONDANSETRON 2 MG/ML
4 INJECTION INTRAMUSCULAR; INTRAVENOUS EVERY 30 MIN PRN
Status: DISCONTINUED | OUTPATIENT
Start: 2021-04-16 | End: 2021-04-16 | Stop reason: HOSPADM

## 2021-04-16 RX ORDER — VITAMIN B COMPLEX
50 TABLET ORAL AT BEDTIME
Status: DISCONTINUED | OUTPATIENT
Start: 2021-04-16 | End: 2021-04-17 | Stop reason: HOSPADM

## 2021-04-16 RX ADMIN — SODIUM CHLORIDE: 9 INJECTION, SOLUTION INTRAVENOUS at 09:04

## 2021-04-16 RX ADMIN — PHENYLEPHRINE HYDROCHLORIDE 100 MCG: 10 INJECTION INTRAVENOUS at 09:34

## 2021-04-16 RX ADMIN — DEXAMETHASONE SODIUM PHOSPHATE 4 MG: 4 INJECTION, SOLUTION INTRA-ARTICULAR; INTRALESIONAL; INTRAMUSCULAR; INTRAVENOUS; SOFT TISSUE at 09:30

## 2021-04-16 RX ADMIN — PHENYLEPHRINE HYDROCHLORIDE 0.3 MCG/KG/MIN: 10 INJECTION INTRAVENOUS at 10:22

## 2021-04-16 RX ADMIN — SODIUM CHLORIDE, POTASSIUM CHLORIDE, SODIUM LACTATE AND CALCIUM CHLORIDE: 600; 310; 30; 20 INJECTION, SOLUTION INTRAVENOUS at 12:42

## 2021-04-16 RX ADMIN — BUPIVACAINE HYDROCHLORIDE 15 ML: 5 INJECTION, SOLUTION EPIDURAL; INTRACAUDAL at 08:30

## 2021-04-16 RX ADMIN — ONDANSETRON 4 MG: 2 INJECTION INTRAMUSCULAR; INTRAVENOUS at 10:52

## 2021-04-16 RX ADMIN — Medication 10 MG: at 09:34

## 2021-04-16 RX ADMIN — ONDANSETRON 4 MG: 2 INJECTION INTRAMUSCULAR; INTRAVENOUS at 12:10

## 2021-04-16 RX ADMIN — ROCURONIUM BROMIDE 50 MG: 10 INJECTION INTRAVENOUS at 09:19

## 2021-04-16 RX ADMIN — SERTRALINE HYDROCHLORIDE 50 MG: 50 TABLET, FILM COATED ORAL at 22:59

## 2021-04-16 RX ADMIN — PROPOFOL 70 MG: 10 INJECTION, EMULSION INTRAVENOUS at 09:19

## 2021-04-16 RX ADMIN — HYDROMORPHONE HYDROCHLORIDE 0.3 MG: 1 INJECTION, SOLUTION INTRAMUSCULAR; INTRAVENOUS; SUBCUTANEOUS at 12:10

## 2021-04-16 RX ADMIN — Medication 10 MG: at 09:26

## 2021-04-16 RX ADMIN — CLINDAMYCIN PHOSPHATE 900 MG: 900 INJECTION, SOLUTION INTRAVENOUS at 09:30

## 2021-04-16 RX ADMIN — CARVEDILOL 25 MG: 25 TABLET, FILM COATED ORAL at 21:23

## 2021-04-16 RX ADMIN — PROPOFOL 20 MCG/KG/MIN: 10 INJECTION, EMULSION INTRAVENOUS at 09:19

## 2021-04-16 RX ADMIN — Medication 50 MCG: at 22:59

## 2021-04-16 RX ADMIN — LIDOCAINE HYDROCHLORIDE 15 ML: 20 INJECTION, SOLUTION INFILTRATION; PERINEURAL at 08:30

## 2021-04-16 RX ADMIN — LIDOCAINE HYDROCHLORIDE 100 MG: 20 INJECTION, SOLUTION INFILTRATION; PERINEURAL at 09:19

## 2021-04-16 RX ADMIN — PHENYLEPHRINE HYDROCHLORIDE 50 MCG: 10 INJECTION INTRAVENOUS at 10:22

## 2021-04-16 RX ADMIN — PHENYLEPHRINE HYDROCHLORIDE 50 MCG: 10 INJECTION INTRAVENOUS at 10:01

## 2021-04-16 RX ADMIN — INSULIN GLARGINE 18 UNITS: 100 INJECTION, SOLUTION SUBCUTANEOUS at 23:00

## 2021-04-16 RX ADMIN — FENTANYL CITRATE 100 MCG: 50 INJECTION, SOLUTION INTRAMUSCULAR; INTRAVENOUS at 09:19

## 2021-04-16 RX ADMIN — HYDROMORPHONE HYDROCHLORIDE 0.2 MG: 1 INJECTION, SOLUTION INTRAMUSCULAR; INTRAVENOUS; SUBCUTANEOUS at 12:30

## 2021-04-16 RX ADMIN — SUGAMMADEX 200 MG: 100 INJECTION, SOLUTION INTRAVENOUS at 11:30

## 2021-04-16 RX ADMIN — GLYCOPYRROLATE 0.2 MG: 0.2 INJECTION, SOLUTION INTRAMUSCULAR; INTRAVENOUS at 09:32

## 2021-04-16 RX ADMIN — Medication 5 MG: at 09:51

## 2021-04-16 RX ADMIN — AMLODIPINE BESYLATE 5 MG: 5 TABLET ORAL at 21:22

## 2021-04-16 ASSESSMENT — ACTIVITIES OF DAILY LIVING (ADL): ADLS_ACUITY_SCORE: 18

## 2021-04-16 ASSESSMENT — MIFFLIN-ST. JEOR: SCORE: 1468.5

## 2021-04-16 NOTE — ANESTHESIA PROCEDURE NOTES
Infraclavicular Procedure Note  Pre-Procedure   Staff -        Anesthesiologist:  Vidal Kincaid MD       Resident/Fellow: Keira Hagen MD       Performed By: resident       Location: pre-op       Pre-Anesthestic Checklist: patient identified, IV checked, site marked, risks and benefits discussed, informed consent, monitors and equipment checked, pre-op evaluation, at physician/surgeon's request and post-op pain management  Timeout:       Correct Patient: Yes        Correct Procedure: Yes        Correct Site: Yes        Correct Position: Yes        Correct Laterality: Yes        Site Marked: Yes  Procedure Documentation  Procedure: Infraclavicular       Diagnosis: POST OPERATIVE PAIN       Laterality: right       Patient Position: supine       Patient Prep/Sterile Barriers: sterile gloves, mask       Skin prep: Chloraprep       Needle Type: short bevel       Needle Gauge: 21.        Needle Length (millimeters): 110        - Ultrasound guided       - Ultrasound used to identify targeted nerve, plexus, vascular marker, or fascial plane and place a needle adjacent to it in real-time       - Ultrasound was used to visualize the spread of anesthetic in close proximity to the above referenced structure       - A permanent image is entered into the patient's record.    Assessment/Narrative         The placement was negative for: blood aspirated, painful injection and site bleeding       Paresthesias: No.     Bolus given via needle..        Secured via.        Insertion/Infusion Method: Single Shot       Complications: none       Injection made incrementally with aspirations every 5 mL.

## 2021-04-16 NOTE — OP NOTE
Transplant Surgery  Operative Note  PREOP DIAGNOSIS: End Stage Renal Failure, right brachiocephalic AV fistula with steal syndrome  POSTOP DIAGNOSIS: Same  OPERATION: Arteriovenous fistula banding- right upper arm brachiocephalic AV fistula. Intraop ultrasound used.  FACULTY: Kennedy Banks MD  FELLOW: Cheo Garcia MD- Fellow  ANESTHESIA: General and Scalene block  EBL: 20 ml.  SPECIMEN: none    INDICATION: The patient was referred by Dr. Basilio for permanent dialysis access creation due to renal failure. She underwent brachiocephalic AV fistula creation five months ago. Her fistula matured very well, but she has had some persistent symptoms concerning for steal syndrome. She initially improved with hand exercises, but has subsequently developed a fingertip wound on her right middle finger that is not healing well. Thus, we elected to attempt fistula banding to improve perfusion of her hand. The indications, benefits, and risks of permanent vascular access creation were discussed, questions answered and informed consent was provided.   FINDINGS:   The fistula had matured very well. Initially the flow measured 1.6L/min. Radial and ulnar pulses were Dopplerable and monophasic. An 8cm segment of proximal fistula beginning at the anastomosis was dissected free. The fistula was partially occluded to measure the amount of narrowing needed. Two vertical suture bands were performed. Flow at completion was 450mL/min as measured with the VeriQ probe. The radial and ulnar pulses were much stronger and triphasic. Fingertips were warm with brisk cap refill both prior to and following surgery.  COMPLICATIONS: None.    PROCEDURE: The patient was positioned supine, and the right upper extremity was positioned, prepped and draped in the usual sterile fashion. A timeout was performed by all members of the surgical team (circulator, anesthesia, technologist, surgeon) confirming the correct patient, procedure, position,  laterality, and fire risk. All present were in agreement. Prior to prep I ultrasounded and marked the course of her radial and ulnar arteries at the wrist and distal forearm. I directly observed the change in flow with fistula occlusion and partial compression.     We reopened her prior incision. Using a combination of blunt dissection and cautery, the fistula was identified and dissected free of the subcutaneous tissues. We were able to see the prolene sutures marking the initial anastomosis. We dissected a length of 8cm of the proximal fistula free of the subcutaneous tissues. The fistula had matured very well, with a very strong thrill and a diameter of 8mm. I sterilely dopplered the previously marked radial and ulnar pulses and noted monophasic flow. When I fully occluded the fistula, the flow dramatically improved to triphasic with a notably palpable radial pulse. At ~50% occlusion, these radial and ulnar arterial flow characteristics did not change. We measured a flow between 400 and 550mL/min at ~50% occlusion on several occasions, helping guide our decision on the degree of narrowing to pursue.    I used a 6-0 prolene to plicate the fistula wall on itself for a length of 4cm, resulting in a flow reduction to 950mL/min. I then used another 6-0 prolene to plicate the fistula wall again directly opposite the site of the first plication for a similar length. The fistula was reduced in diameter to 5mm. Flow was measured at 450mL/min on several occasions. The radial and ulnar pulses were much more strongly dopplerable with triphasic wave forms, and the radial pulse was palpable.     The wound was closed in layers with absorbable suture and dressed with Dermabond. Counts were correct. Faculty was present for the key portions of the procedure. The patient was then awakened and transferred to PACU in good condition.

## 2021-04-16 NOTE — ANESTHESIA CARE TRANSFER NOTE
Patient: Supriya Herr    Procedure(s):  Banding of right upper arm arteriovenous fistula    Diagnosis: ESRD (end stage renal disease) (H) [N18.6]  Steal syndrome as complication of dialysis access (H) [T82.898A]  Diagnosis Additional Information: No value filed.    Anesthesia Type:   General, Peripheral Nerve Block     Note:    Oropharynx: spontaneously breathing  Level of Consciousness: awake  Oxygen Supplementation: face mask  Level of Supplemental Oxygen (L/min / FiO2): 6  Independent Airway: airway patency satisfactory and stable  Dentition: dentition unchanged  Vital Signs Stable: post-procedure vital signs reviewed and stable  Report to RN Given: handoff report given  Patient transferred to: PACU  Comments: Pt extubated in the OR without incident or complications. Pt VSS upon arrival to the PACU. Pt has no c/o pain/N/V. Pt care report given to receiving RN.   Handoff Report: Identifed the Patient, Identified the Reponsible Provider, Reviewed the pertinent medical history, Discussed the surgical course, Reviewed Intra-OP anesthesia mangement and issues during anesthesia, Set expectations for post-procedure period and Allowed opportunity for questions and acknowledgement of understanding      Vitals: (Last set prior to Anesthesia Care Transfer)  CRNA VITALS  4/16/2021 1126 - 4/16/2021 1202      4/16/2021             Resp Rate (observed):  (!) 5        Electronically Signed By: PENELOPE Ruiz CRNA  April 16, 2021  12:02 PM

## 2021-04-16 NOTE — ANESTHESIA POSTPROCEDURE EVALUATION
Patient: Supriya Herr    Procedure(s):  Banding of right upper arm arteriovenous fistula    Diagnosis:ESRD (end stage renal disease) (H) [N18.6]  Steal syndrome as complication of dialysis access (H) [T82.898A]  Diagnosis Additional Information: No value filed.    Anesthesia Type:  General, Peripheral Nerve Block    Note:  Disposition: Outpatient   Postop Pain Control: Uneventful            Sign Out: Well controlled pain   PONV: No   Neuro/Psych: Uneventful            Sign Out: Acceptable/Baseline neuro status   Airway/Respiratory: Uneventful            Sign Out: Acceptable/Baseline resp. status   CV/Hemodynamics: Uneventful            Sign Out: Acceptable CV status   Other NRE: NONE   DID A NON-ROUTINE EVENT OCCUR? No         Last vitals:  Vitals:    04/16/21 1215 04/16/21 1230 04/16/21 1245   BP: (!) 148/62 (!) 156/61 (!) 154/53   Pulse: 68 64 63   Resp: 12 16 18   Temp:   36.4  C (97.6  F)   SpO2:          Last vitals prior to Anesthesia Care Transfer:  CRNA VITALS  4/16/2021 1126 - 4/16/2021 1226      4/16/2021             Resp Rate (observed):  (!) 5          Electronically Signed By: Destin Meadows MD  April 16, 2021  1:15 PM

## 2021-04-16 NOTE — ANESTHESIA PROCEDURE NOTES
Airway       Patient location during procedure: OR  Staff -        Anesthesiologist:  Destin Meadows MD       CRNA: Lauren Velarde APRN CRNA       Performed By: CRNAIndications and Patient Condition       Indications for airway management: maciel-procedural       Induction type:intravenous       Mask difficulty assessment: 2 - vent by mask + OA or adjuvant +/- NMBA    Final Airway Details       Final airway type: endotracheal airway       Successful airway: ETT - single  Endotracheal Airway Details        ETT size (mm): 7.0       Cuffed: yes       Successful intubation technique: direct laryngoscopy       DL Blade Type: MAC 3       Grade View of Cords: 1       Adjucts: stylet       Position: Right       Measured from: lips       Secured at (cm): 23       Bite block used: None    Post intubation assessment        Placement verified by: capnometry, equal breath sounds and chest rise        Number of attempts at approach: 1       Number of other approaches attempted: 0       Secured with: pink tape       Ease of procedure: easy (vocal cords open and clear)       Dentition: Unchanged (no teeth)

## 2021-04-16 NOTE — DISCHARGE SUMMARY
[facility] Discharge Summary    Supriya Herr MRN# [id number]   Age: 71 year old YOB: 1950     Date of Admission:  4/16/2021  Date of Discharge::  4/17/2021  Admitting Physician:  Kennedy Banks MD  Discharge Physician:  Kennedy Banks MD               Admission Diagnoses:   End stage renal disease s/p Right upper extremity brachiocephalic fistula placement, complicated by steal syndrome          Discharge Diagnosis:   same          Procedures:   Procedure(s): Brachiocephalic fistula banding  Findings: initial flow 1.6L/min on VeriQ measurement with monophasic radial and ulnar doppler signals (nonpalpable radial). Following banding, fistula flow 450mL/min with triphasic doppler signals in ulnar and radial, with palpable radial pulses. Fingers warm with brisk cap refill prior to and following surgery.       No other procedures performed during this admission           Medications Prior to Admission:     Medications Prior to Admission   Medication Sig Dispense Refill Last Dose     acetaminophen (TYLENOL) 325 MG tablet Take 2 tablets (650 mg) by mouth every 4 hours as needed for mild pain 50 tablet 0 Past Week at Unknown time     albuterol (PROAIR HFA/PROVENTIL HFA/VENTOLIN HFA) 108 (90 Base) MCG/ACT inhaler Inhale 2 puffs into the lungs every 6 hours as needed for shortness of breath / dyspnea or wheezing 3 Inhaler 1 Past Month at Unknown time     amLODIPine (NORVASC) 5 MG tablet Take 1 tablet (5 mg) by mouth 2 times daily 180 tablet 3 4/16/2021 at 0530     apremilast (OTEZLA) 30 MG tablet Take 1 tablet (30 mg) by mouth daily 90 tablet 3 4/15/2021 at Unknown time     atorvastatin (LIPITOR) 20 MG tablet TAKE 1 TABLET BY MOUTH EVERY EVENING 90 tablet 0 4/15/2021 at Unknown time     carvedilol (COREG) 25 MG tablet Take 1 tablet (25 mg) by mouth 2 times daily (with meals) 180 tablet 3 4/16/2021 at 0530     Epoetin Martínez (EPOGEN IJ) Inject 800 Units into the vein  three times a week tues thurs sat at dialysis   4/15/2021 at Unknown time     furosemide (LASIX) 80 MG tablet Take 1 tablet (80 mg) by mouth 2 times daily 180 tablet 3 4/15/2021 at Unknown time     gabapentin (NEURONTIN) 100 MG capsule Take 1 capsule (100 mg) by mouth 3 times daily as needed (pain) 60 capsule 3 4/16/2021 at 0530     insulin aspart (NOVOLOG FLEXPEN) 100 UNIT/ML pen INJECT 4 UNITS SUBCUTANEOUSLY WITH BREAKFAST, LUNCH AND DINNER. 15 mL 3 4/15/2021 at 1700     insulin glargine (BASAGLAR KWIKPEN) 100 UNIT/ML pen Inject 18 Units Subcutaneous At Bedtime Pt to take 14 units if pt is NPO for surgery 15 mL 0 4/15/2021 at Unknown time     Iron Sucrose (VENOFER IV) Inject 50 mg into the vein twice a week At dialysis session (tues/Sat)   4/15/2021 at Unknown time     nystatin (MYCOSTATIN) 234649 UNIT/GM external ointment Twice daily to finger rash until healed. 15 g 1 4/15/2021 at Unknown time     Probiotic Product (PROBIOTIC PO) Take 1 capsule by mouth daily   Past Week at Unknown time     sertraline (ZOLOFT) 50 MG tablet Take 1 tablet (50 mg) by mouth At Bedtime 90 tablet 1 4/15/2021 at Unknown time     sevelamer carbonate (RENVELA) 800 MG tablet Take 800 mg by mouth Take with snacks or supplements Take 2 capsules with meals and 1 with snacks.   4/15/2021 at Unknown time     vitamin D3 (CHOLECALCIFEROL) 2000 units (50 mcg) tablet Take 1 tablet (2,000 Units) by mouth daily (Patient taking differently: Take 2,000 Units by mouth At Bedtime ) 100 tablet 3 4/15/2021 at Unknown time     blood glucose (ONE TOUCH DELICA) lancing device Device to be used with lancets.needs lancets device for delica lancets 1 each 1      blood glucose (ONETOUCH ULTRA) test strip TEST YOUR BLOOD SUGAR 3-4 TIMES PER DAY. 400 strip 1      desonide (DESOWEN) 0.05 % external ointment Apply topically as needed   More than a month at Unknown time     insulin pen needle (ULTICARE MINI) 31G X 6 MM Use daily or as directed. 100 each 1      order  "for DME Equipment being ordered: mattress overlay for hospital bed  Wt. 192#  Height 5'5\"  99 months/Lifetime 1 Units 0      order for DME 1 wheelchair 1 Device 0              Discharge Medications:   Additional medication:    Tramadol 50mg PO q6hrs PRN- pain          Consultations:   No consultations were requested during this admission          Brief History of Illness:   Supriya Herr is a very pleasant 72 yo F well-known to the transplant service. She has end stage renal disease due to type 2 diabetes mellitus, and has a previously placed left upper extremity brachial axillary AV graft. This access has been troubled with outflow stenosis, and she was referred for creation of alternate access with suspected impending failure of her graft. She underwent right upper extremity brachiocephalic AV fistula creation. This fistula was complicated by very high flow with steal syndrome in her right hand. Initially her symptoms were manageable with hand and arm exercising, but she recently developed an ulcer on her middle finger. It is unclear what the inciting factor for the wound is, but it does not appear to be healing due to poor blood flow. Thus we have elected to proceed with banding of the fistula.           Hospital Course:   The patient's hospital course was unremarkable.  She recovered as anticipated and experienced no post-operative complications. She had a strongly palpable radial pulse and a good thrill in her fistula. Her pain was controlled with oral analgesia and the block. She was able to take a regular diet without issue. She is able to transfer and engage in self care better as her scalene block wears off, and wishes to return home today. She underwent hemodialysis prior to discharge.    Discharge Physical Exam:  Gen: alert, oriented. No distress  HEENT: Normocephalic. No icterus. Moist oral mucosa  Cardio: RRR, no murmur  Pulm: nonlabored respirations. LCTAB  Abd: soft, nontender  Ext: wwp. Left upper " extremity with palpable radial pulse and palpable thrill in graft. Right upper extremity examined in detail. Strong palpable thrill in fistula. Palpable radial pulse. Doppler signal appreciated in both radial and ulnar pulses.          Discharge Instructions and Follow-Up:   Discharge diet: Regular- resume home renal/diabetic diet   Discharge activity: Activity as tolerated  RESTRICTIONS:  -Please continue to follow fistula precautions with both arms.  -Please avoid heavy lifting or activity with your RIGHT arm for two weeks to allow your wound to heal  -Please do not soak or submerge your arm in pools or lakes for two weeks to allow it to heal and prevent infection  -You may shower tomorrow   Discharge follow-up: Follow up with Jesus Molina in 2 weeks. Please obtain fistula duplex and distal brachial artery flow at that appointment Follow-up with Dr. Banks in 3-4 weeks. Call for appointment sooner with any concerns or questions.    Wound care: Incision closed with dissolving stitches and dermabond.            Discharge Disposition:   Discharged to home      Attestation:  I have reviewed today's vital signs, medications, labs and imaging.  Total time: 30 minutes    Kennedy Banks MD

## 2021-04-17 ENCOUNTER — APPOINTMENT (OUTPATIENT)
Dept: PHYSICAL THERAPY | Facility: CLINIC | Age: 71
End: 2021-04-17
Attending: SURGERY
Payer: MEDICARE

## 2021-04-17 VITALS
TEMPERATURE: 96 F | HEART RATE: 61 BPM | SYSTOLIC BLOOD PRESSURE: 136 MMHG | BODY MASS INDEX: 31.54 KG/M2 | RESPIRATION RATE: 16 BRPM | HEIGHT: 68 IN | DIASTOLIC BLOOD PRESSURE: 58 MMHG | OXYGEN SATURATION: 100 % | WEIGHT: 208.1 LBS

## 2021-04-17 LAB
ANION GAP SERPL CALCULATED.3IONS-SCNC: 9 MMOL/L (ref 3–14)
BUN SERPL-MCNC: 68 MG/DL (ref 7–30)
CALCIUM SERPL-MCNC: 9.1 MG/DL (ref 8.5–10.1)
CHLORIDE SERPL-SCNC: 105 MMOL/L (ref 94–109)
CO2 SERPL-SCNC: 23 MMOL/L (ref 20–32)
CREAT SERPL-MCNC: 5.98 MG/DL (ref 0.52–1.04)
GFR SERPL CREATININE-BSD FRML MDRD: 6 ML/MIN/{1.73_M2}
GLUCOSE BLDC GLUCOMTR-MCNC: 174 MG/DL (ref 70–99)
GLUCOSE BLDC GLUCOMTR-MCNC: 197 MG/DL (ref 70–99)
GLUCOSE BLDC GLUCOMTR-MCNC: 249 MG/DL (ref 70–99)
GLUCOSE SERPL-MCNC: 142 MG/DL (ref 70–99)
MAGNESIUM SERPL-MCNC: 2.7 MG/DL (ref 1.6–2.3)
PHOSPHATE SERPL-MCNC: 5.8 MG/DL (ref 2.5–4.5)
POTASSIUM SERPL-SCNC: 4.2 MMOL/L (ref 3.4–5.3)
SODIUM SERPL-SCNC: 137 MMOL/L (ref 133–144)

## 2021-04-17 PROCEDURE — 36415 COLL VENOUS BLD VENIPUNCTURE: CPT | Performed by: SURGERY

## 2021-04-17 PROCEDURE — 82962 GLUCOSE BLOOD TEST: CPT

## 2021-04-17 PROCEDURE — G0257 UNSCHED DIALYSIS ESRD PT HOS: HCPCS

## 2021-04-17 PROCEDURE — 250N000013 HC RX MED GY IP 250 OP 250 PS 637: Performed by: SURGERY

## 2021-04-17 PROCEDURE — 96372 THER/PROPH/DIAG INJ SC/IM: CPT

## 2021-04-17 PROCEDURE — 258N000003 HC RX IP 258 OP 636: Performed by: INTERNAL MEDICINE

## 2021-04-17 PROCEDURE — 250N000013 HC RX MED GY IP 250 OP 250 PS 637

## 2021-04-17 PROCEDURE — 250N000012 HC RX MED GY IP 250 OP 636 PS 637

## 2021-04-17 PROCEDURE — 90937 HEMODIALYSIS REPEATED EVAL: CPT

## 2021-04-17 PROCEDURE — 250N000013 HC RX MED GY IP 250 OP 250 PS 637: Performed by: PHYSICIAN ASSISTANT

## 2021-04-17 PROCEDURE — 99223 1ST HOSP IP/OBS HIGH 75: CPT | Mod: 24 | Performed by: INTERNAL MEDICINE

## 2021-04-17 PROCEDURE — 84100 ASSAY OF PHOSPHORUS: CPT | Performed by: SURGERY

## 2021-04-17 PROCEDURE — 97530 THERAPEUTIC ACTIVITIES: CPT | Mod: GP | Performed by: PHYSICAL THERAPIST

## 2021-04-17 PROCEDURE — 83735 ASSAY OF MAGNESIUM: CPT | Performed by: SURGERY

## 2021-04-17 PROCEDURE — 80048 BASIC METABOLIC PNL TOTAL CA: CPT | Performed by: SURGERY

## 2021-04-17 PROCEDURE — 97161 PT EVAL LOW COMPLEX 20 MIN: CPT | Mod: GP | Performed by: PHYSICAL THERAPIST

## 2021-04-17 PROCEDURE — 87340 HEPATITIS B SURFACE AG IA: CPT | Performed by: INTERNAL MEDICINE

## 2021-04-17 PROCEDURE — 86706 HEP B SURFACE ANTIBODY: CPT | Mod: GZ | Performed by: INTERNAL MEDICINE

## 2021-04-17 RX ORDER — TRAMADOL HYDROCHLORIDE 50 MG/1
25 TABLET ORAL EVERY 6 HOURS PRN
Qty: 20 TABLET | Refills: 0 | Status: SHIPPED | OUTPATIENT
Start: 2021-04-17 | End: 2021-04-17

## 2021-04-17 RX ORDER — ATORVASTATIN CALCIUM 20 MG/1
20 TABLET, FILM COATED ORAL DAILY
Status: DISCONTINUED | OUTPATIENT
Start: 2021-04-17 | End: 2021-04-17 | Stop reason: HOSPADM

## 2021-04-17 RX ORDER — SEVELAMER CARBONATE 800 MG/1
1600 TABLET, FILM COATED ORAL
Status: DISCONTINUED | OUTPATIENT
Start: 2021-04-17 | End: 2021-04-17 | Stop reason: HOSPADM

## 2021-04-17 RX ORDER — TRAMADOL HYDROCHLORIDE 50 MG/1
25 TABLET ORAL EVERY 6 HOURS PRN
Qty: 20 TABLET | Refills: 0 | Status: SHIPPED | OUTPATIENT
Start: 2021-04-17 | End: 2021-08-17

## 2021-04-17 RX ADMIN — INSULIN ASPART 4 UNITS: 100 INJECTION, SOLUTION INTRAVENOUS; SUBCUTANEOUS at 08:38

## 2021-04-17 RX ADMIN — SEVELAMER CARBONATE 1600 MG: 800 TABLET, FILM COATED ORAL at 11:54

## 2021-04-17 RX ADMIN — GABAPENTIN 100 MG: 100 CAPSULE ORAL at 09:18

## 2021-04-17 RX ADMIN — TRAMADOL HYDROCHLORIDE 25 MG: 50 TABLET ORAL at 20:15

## 2021-04-17 RX ADMIN — ATORVASTATIN CALCIUM 20 MG: 20 TABLET, FILM COATED ORAL at 08:46

## 2021-04-17 RX ADMIN — Medication: at 16:42

## 2021-04-17 RX ADMIN — SODIUM CHLORIDE 250 ML: 9 INJECTION, SOLUTION INTRAVENOUS at 16:42

## 2021-04-17 RX ADMIN — AMLODIPINE BESYLATE 5 MG: 5 TABLET ORAL at 10:53

## 2021-04-17 RX ADMIN — INSULIN ASPART 4 UNITS: 100 INJECTION, SOLUTION INTRAVENOUS; SUBCUTANEOUS at 18:35

## 2021-04-17 RX ADMIN — AMLODIPINE BESYLATE 5 MG: 5 TABLET ORAL at 20:14

## 2021-04-17 RX ADMIN — INSULIN ASPART 4 UNITS: 100 INJECTION, SOLUTION INTRAVENOUS; SUBCUTANEOUS at 12:28

## 2021-04-17 RX ADMIN — ACETAMINOPHEN 975 MG: 325 TABLET, FILM COATED ORAL at 03:32

## 2021-04-17 RX ADMIN — CARVEDILOL 25 MG: 25 TABLET, FILM COATED ORAL at 20:14

## 2021-04-17 RX ADMIN — SEVELAMER CARBONATE 1600 MG: 800 TABLET, FILM COATED ORAL at 18:44

## 2021-04-17 RX ADMIN — SODIUM CHLORIDE 300 ML: 9 INJECTION, SOLUTION INTRAVENOUS at 16:41

## 2021-04-17 RX ADMIN — SEVELAMER CARBONATE 1600 MG: 800 TABLET, FILM COATED ORAL at 08:46

## 2021-04-17 RX ADMIN — CARVEDILOL 25 MG: 25 TABLET, FILM COATED ORAL at 10:54

## 2021-04-17 RX ADMIN — FUROSEMIDE 80 MG: 40 TABLET ORAL at 08:45

## 2021-04-17 RX ADMIN — NYSTATIN: 100000 OINTMENT TOPICAL at 09:23

## 2021-04-17 RX ADMIN — GABAPENTIN 100 MG: 100 CAPSULE ORAL at 02:39

## 2021-04-17 RX ADMIN — ACETAMINOPHEN 975 MG: 325 TABLET, FILM COATED ORAL at 11:54

## 2021-04-17 ASSESSMENT — ACTIVITIES OF DAILY LIVING (ADL)
ADLS_ACUITY_SCORE: 18

## 2021-04-17 ASSESSMENT — MIFFLIN-ST. JEOR: SCORE: 1502.44

## 2021-04-17 NOTE — PROGRESS NOTES
Pt transferred from PACU at 1750. VSS on 1 L NC ex HTN. Denies pain. AVF banding completed today. R AVF and L AVF bruit and thrill present. Anxious, frustrated with cares. L AKA, lift assistance. BP on LLL only. Nerve block of R hand, slight contraction. Left leg PIV infusing. Signed and held orders released. Continue to monitor and follow POC.
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
## 2021-04-17 NOTE — PROGRESS NOTES
Pt. VSS RA ex. HTN. Alert and oriented x4. Pt. refused capno and taken off. Pt. refused Q2h repo and full skin assessment. Pt. given PRN tylenol and gabapentin for pain. AVF banding completed yesterday with liquid bandage as dressing PROSPER. Thrills and bruits present on  L. and R. fisultas bilaterally. Tele was NSR. Pt. has L. AKA. PIV in R. leg SL. BP only on LLE below knee. Nerve block on R. hand with some contraction noted. Pt. to receive dialysis today. Pt. has HD on Tuesday, Thursdays, and Saturdays. Insulin order updated to 4 unit(s) three times daily with meals. Pt. also needs clarification for renvela. pt. reported taking 2 tablets 3 times daily with meals. Current order is for 1 tablet PRN with snacks. Pt. also reported taking atorvastatin, but no order in place. Overnight MD aware and will pass on to AM nurse for follow-up.

## 2021-04-17 NOTE — PROGRESS NOTES
Received call from Utilization Review RNMyriam, with update that per Utilization MD review, patient meets Outpatient criteria.  UR is awaiting Outpatient order.  I spoke with 5A RN and requested she provide patient with Obs letter.      Dorian Small, RN, PHN  RN Care Coordinator   [address] [address]   [email or web address]   Casual Employee - Weekend Coverage only  To contact weekend RNCC, dial [phone] and enter pager number [phone] at prompt OR use [facility] to text page.  This pager can not be contacted by outside line.
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
## 2021-04-17 NOTE — DISCHARGE INSTRUCTIONS
May start pre-hospital diet immediately.    No lifting >10 pounds for 6 weeks.  No rigorous physical activity.      May shower now. No bathing for two weeks.    Call [phone] to schedule or with questions during the week days.      Call [phone] and ask to speak with surgery resident if you are having troubles in the evenings, at night, or on weekends. Please call if you experience increasing abdominal pain, nausea, vomiting, increasing drainage from your wounds, chills, or fever >101.5    Take stool softener while taking narcotic pain medication.    No driving for at least 12 hours after taking narcotic pain medication.    Future Appointments   Date Time Provider Department Center   4/23/2021 12:00 PM Arabella Kamara PA-C M[facility]   4/26/2021  3:00 PM Damaris Molina APRN CNS TXS [facility]   6/11/2021  3:40 PM Eleazar Pack DPM [facility]   7/28/2021  3:20 PM [facility]

## 2021-04-17 NOTE — UTILIZATION REVIEW
Admission Status; Secondary Review Determination    Under the authority of the Utilization Management Committee, the utilization review process indicated a secondary review on the above patient. The review outcome is based on review of the medical records, discussions with staff, and applying clinical experience noted on the date of the review.    (x) Outpatient Status with extended recovery is appropriate - This patient does not meet hospital inpatient criteria. If this patient's primary payer is Medicare and was admitted as an inpatient, Condition Code 44 should be used and patient status changed to outpatient recovery.    RATIONALE FOR DETERMINATION:  71 years old woman who underwent a Arteriovenous fistula banding- right upper arm brachiocephalic AV fistula.  Patient was admitted to the hospital after the procedure. Patient has Medicare and the procedure is not on the CMS inpatient list. No documented complications or unexpected recovery. Patient can be safely monitored for bleeding and recover in outpatient/extended recovery setting.    The severity of illness, intensity of service provided, expected LOS and risk for adverse outcome doesn't meet inpatient hospital admission.    This document was produced using voice recognition software.    The information on this document is developed by the utilization review team in order for the business office to ensure compliance. This only denotes the appropriateness of proper admission status and does not reflect the quality of care rendered.    The definitions of Inpatient Status and Observation Status used in making the determination above are those provided in the CMS Coverage Manual, Chapter 1 and Chapter 6, section 70.4.    Sincerely,    Eduardo Rebolledo MD  Utilization Review  Physician Advisor  [facility].
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
## 2021-04-17 NOTE — PROGRESS NOTES
HEMODIALYSIS TREATMENT NOTE    Date: 4/17/2021  Time: 7.30 PM    Data:  Pre Wt:94.4kg     Desired Wt: 92.4 kg   Post Wt:  92.4kg (Estimated)  Weight change: 2.0  kg  Ultrafiltration - Post Run Net Total Removed :2000 ML    Vascular Access Status: Graft  patent  Dialyzer Rinse: Streaked. Light   Total Blood Volume Processed:74.6L     Total Dialysis (Treatment) Time:3.0 hours     Dialysate Bath: K 3, Ca 2.25  Heparin: None    Lab:   Yes    Assessment / Interventions: 3.0 hours HD via left AVG, thrill & bruit present. 2 15g needle cannulated.  with good flow. Hemodynamic stable throughout her run, SBP > 100, & Crit-line within normal range. 2.0kg UF pulled no issues. Pt completed her treatment time, blood rinsed back, Graft hemostasis achieved in less than 10 minutes and hand off report given.         Plan:    For possible discharge after dialysis.

## 2021-04-18 LAB
HBV SURFACE AB SERPL IA-ACNC: 0.42 M[IU]/ML
HBV SURFACE AG SERPL QL IA: NONREACTIVE

## 2021-04-18 NOTE — DISCHARGE SUMMARY
Pt. discharged from 5a at 20:30 to home with daughter to transport. Pt. given AVS and education regarding medication regimen at home. Valuables and belonging sent with pt. PIV removed.

## 2021-04-22 DIAGNOSIS — E11.40 TYPE 2 DIABETES MELLITUS WITH DIABETIC NEUROPATHY, WITH LONG-TERM CURRENT USE OF INSULIN (H): ICD-10-CM

## 2021-04-22 DIAGNOSIS — Z79.4 TYPE 2 DIABETES MELLITUS WITH DIABETIC NEUROPATHY, WITH LONG-TERM CURRENT USE OF INSULIN (H): ICD-10-CM

## 2021-04-22 RX ORDER — INSULIN GLARGINE 100 [IU]/ML
18 INJECTION, SOLUTION SUBCUTANEOUS AT BEDTIME
Qty: 15 ML | Refills: 0 | Status: SHIPPED | OUTPATIENT
Start: 2021-04-22 | End: 2021-04-24

## 2021-04-22 NOTE — PROGRESS NOTES
Supriya is a 71 year old who is being evaluated via a billable video visit.      How would you like to obtain your AVS? [employer]     Text video visit link to: [phone]    Will anyone else be joining your video visit? Yes, daughter Caroline Gray.    Karlene REN MA    Outcome for 04/22/21 11:21 AM :Glucose data sent in [employer] Message

## 2021-04-22 NOTE — TELEPHONE ENCOUNTER
insulin glargine (BASAGLAR KWIKPEN) 100 UNIT/ML pen      Last Written Prescription Date:  2-9-2021  Last Fill Quantity: 15 ml,   # refills: 0  Last Office Visit : 2-  Future Office visit:  4-    Routing refill request to provider for review/approval because:  Insulin - refilled per clinic        Kathleen M Doege RN

## 2021-04-22 NOTE — PATIENT INSTRUCTIONS
We appreciate your assistance in coordinating your healthcare.     Please upload your insulin pump, blood sugar meter and/or continuous glucose monitor at home 1-2 days before your next diabetes-related appointment.   This will allow your provider to review your  data before your scheduled virtual visit.    To ask a question to your Endocrine care team, please send them a [employer] message, or reach them by phone at [phone]     To expedite your medication refill(s), please contact your pharmacy and have them   fax a refill request to: [phone].  *Please allow 3 business days for routine medication refills.  *Please allow 5 business days for controlled substance medication refills.    For after-hours urgent Endocrine issues, that do not require 441, please dial [phone], and ask to speak with the Endocrinologist On-Call

## 2021-04-23 ENCOUNTER — VIRTUAL VISIT (OUTPATIENT)
Dept: ENDOCRINOLOGY | Facility: CLINIC | Age: 71
End: 2021-04-23
Payer: MEDICARE

## 2021-04-23 DIAGNOSIS — E11.40 TYPE 2 DIABETES MELLITUS WITH DIABETIC NEUROPATHY, WITH LONG-TERM CURRENT USE OF INSULIN (H): Primary | ICD-10-CM

## 2021-04-23 DIAGNOSIS — Z79.4 TYPE 2 DIABETES MELLITUS WITH DIABETIC NEUROPATHY, WITH LONG-TERM CURRENT USE OF INSULIN (H): Primary | ICD-10-CM

## 2021-04-23 PROCEDURE — 99215 OFFICE O/P EST HI 40 MIN: CPT | Mod: 95 | Performed by: PHYSICIAN ASSISTANT
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
## 2021-04-24 RX ORDER — INSULIN GLARGINE 100 [IU]/ML
INJECTION, SOLUTION SUBCUTANEOUS
Qty: 15 ML | Refills: 4 | Status: SHIPPED | OUTPATIENT
Start: 2021-04-24 | End: 2022-05-13
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
## 2021-04-25 DIAGNOSIS — M79.641 PAIN OF RIGHT HAND: ICD-10-CM

## 2021-04-26 ENCOUNTER — OFFICE VISIT (OUTPATIENT)
Dept: TRANSPLANT | Facility: CLINIC | Age: 71
End: 2021-04-26
Attending: CLINICAL NURSE SPECIALIST
Payer: MEDICARE

## 2021-04-26 VITALS — BODY MASS INDEX: 31.63 KG/M2 | WEIGHT: 208 LBS

## 2021-04-26 DIAGNOSIS — N18.6 ESRD ON DIALYSIS (H): Primary | ICD-10-CM

## 2021-04-26 DIAGNOSIS — Z09 FOLLOW-UP EXAMINATION AFTER VASCULAR SURGERY: ICD-10-CM

## 2021-04-26 DIAGNOSIS — Z99.2 ESRD ON DIALYSIS (H): Primary | ICD-10-CM

## 2021-04-26 DIAGNOSIS — Z48.89 ENCOUNTER FOR POST SURGICAL WOUND CHECK: ICD-10-CM

## 2021-04-26 PROCEDURE — 99024 POSTOP FOLLOW-UP VISIT: CPT | Performed by: CLINICAL NURSE SPECIALIST

## 2021-04-26 RX ORDER — GABAPENTIN 100 MG/1
100 CAPSULE ORAL 3 TIMES DAILY PRN
Qty: 60 CAPSULE | Refills: 3 | Status: SHIPPED | OUTPATIENT
Start: 2021-04-26 | End: 2021-09-07

## 2021-04-26 NOTE — LETTER
4/26/2021         RE: Supriya Herr  [address]  [place] [place] [address]        Dear Colleague,    Thank you for referring your patient, Supriya Herr, to the [facility]. Please see a copy of my visit note below.    Dialysis Access Service   Progress Note    S:  Ms. Herr is 71 year old female with ESRD from DM II and hypertension. She is on HD with left upper arm AV graft since 6/20/2020. Multiple thrombosis of LUE AV graft occurred since AV graft is being used. A right brachiocephalic AV fistula created on 11/16/2020 as a back up plan. RUE AV fistula with complications of her LUE AV graft and anticipated need for further access. Fortunately, her graft has continued to function. Her fistula has matured very well, but is complicated by high flows with concern for steal syndrome. Initially, she had symptoms of coldness and intermittent pain, but had some improvement with use of the exercise ball. However, she has also had an infection on the distal phalanx of her right middle finger. She and her daughter believe it started with a hangnail. She is being seen today for surgical followup of her dialysis access.  She reports no  issues with the wound, and  a little steal syndrome of the distal extremity. She reports right hand steal syndrome has improved after surgery. Rigth middle finger is still pain to touch with gangrene covered, but dry without drainage noted. Right hand  is getting warmer after surgery and  is able to make a fist, but not fully. S/P  Arteriovenous fistula banding- right upper arm brachiocephalic AV fistula on 4/16/2021.    O:     GENERAL: alert, cooperative  Circulation:   Radial pulse 3+  Ulnar pulse  3+   Capillary refill:  capillary refill < 2 sec    Sensory exam:   arm: Normal   [x]           Abnormal   []          Comment:    hand: Normal   []           Abnormal   [x]          Comment: mild less sensation from right 4th and 5th fingers   Motor  exam:   arm: Normal   [x]           Abnormal   []          Comment:    hand: Normal   []           Abnormal   []          Comment:  able to make a fist, but not fully.  Access: R upper extremity wound(s) healed. non-tender.  ++ thrill present. Ulcerated gangrene in right middle finger,  dry without drainage noted. Right hand is  warmer to touch,  No edema in right arm without apparent infection    Assessment & Plan: Ms. Herr's dialysis access has good flow at this time point.    1. Follow up dermatology as scheduled  2. Exercise both hands with a squeeze ball as tolerated  3. Follow up with  in clinic in 3 weeks    We would like to see the patient back in the clinic in 3 weeks time to assess progress. The patient was counselled to contact our nurse coordinator, PENELOPE Hollingsworth CNS (Sum) at [phone] with any questions or concerns.  Thank you for the opportunity to participate in Ms. Herr's care.      PENELOPE Hollingsworth MS, CNS (Sum), [facility]  Dialysis Vascular Access/SOT Clinical Nurse Specialist    Solid Organ Transplant Service - [facility] [place]       Again, thank you for allowing me to participate in the care of your patient.        Sincerely,        PENELOPE Barrera CNS

## 2021-04-26 NOTE — PROGRESS NOTES
Dialysis Access Service   Progress Note    S:  Ms. Herr is 71 year old female with ESRD from DM II and hypertension. She is on HD with left upper arm AV graft since 6/20/2020. Multiple thrombosis of LUE AV graft occurred since AV graft is being used. A right brachiocephalic AV fistula created on 11/16/2020 as a back up plan. RUE AV fistula with complications of her LUE AV graft and anticipated need for further access. Fortunately, her graft has continued to function. Her fistula has matured very well, but is complicated by high flows with concern for steal syndrome. Initially, she had symptoms of coldness and intermittent pain, but had some improvement with use of the exercise ball. However, she has also had an infection on the distal phalanx of her right middle finger. She and her daughter believe it started with a hangnail. She is being seen today for surgical followup of her dialysis access.  She reports no  issues with the wound, and  a little steal syndrome of the distal extremity. She reports right hand steal syndrome has improved after surgery. Rigth middle finger is still pain to touch with gangrene covered, but dry without drainage noted. Right hand  is getting warmer after surgery and  is able to make a fist, but not fully. S/P  Arteriovenous fistula banding- right upper arm brachiocephalic AV fistula on 4/16/2021.    O:     GENERAL: alert, cooperative  Circulation:   Radial pulse 3+  Ulnar pulse  3+   Capillary refill:  capillary refill < 2 sec    Sensory exam:   arm: Normal   [x]           Abnormal   []          Comment:    hand: Normal   []           Abnormal   [x]          Comment: mild less sensation from right 4th and 5th fingers   Motor exam:   arm: Normal   [x]           Abnormal   []          Comment:    hand: Normal   []           Abnormal   []          Comment:  able to make a fist, but not fully.  Access: R upper extremity wound(s) healed. non-tender.  ++ thrill present. Ulcerated gangrene  in right middle finger,  dry without drainage noted. Right hand is  warmer to touch,  No edema in right arm without apparent infection    Assessment & Plan: Ms. Herr's dialysis access has good flow at this time point.    1. Follow up dermatology as scheduled  2. Exercise both hands with a squeeze ball as tolerated  3. Follow up with  in clinic in 3 weeks    We would like to see the patient back in the clinic in 3 weeks time to assess progress. The patient was counselled to contact our nurse coordinator, PENELOPE Hollingsworth CNS (Sum) at [phone] with any questions or concerns.  Thank you for the opportunity to participate in Ms. Herr's care.      PENELOPE Hollingsworth MS (Sum), KASSI, [facility]  Dialysis Vascular Access/SOT Clinical Nurse Specialist    Solid Organ Transplant Service - [facility] [place]

## 2021-04-28 ENCOUNTER — OFFICE VISIT (OUTPATIENT)
Dept: DERMATOLOGY | Facility: CLINIC | Age: 71
End: 2021-04-28
Payer: MEDICARE

## 2021-04-28 ENCOUNTER — TELEPHONE (OUTPATIENT)
Dept: NEPHROLOGY | Facility: CLINIC | Age: 71
End: 2021-04-28

## 2021-04-28 DIAGNOSIS — L30.4 INTERTRIGO: ICD-10-CM

## 2021-04-28 DIAGNOSIS — L98.499: ICD-10-CM

## 2021-04-28 DIAGNOSIS — L40.8 INVERSE PSORIASIS: Primary | ICD-10-CM

## 2021-04-28 PROCEDURE — 99214 OFFICE O/P EST MOD 30 MIN: CPT | Performed by: DERMATOLOGY

## 2021-04-28 RX ORDER — TRIAMCINOLONE ACETONIDE 0.25 MG/G
OINTMENT TOPICAL 2 TIMES DAILY
Qty: 80 G | Refills: 1 | Status: SHIPPED | OUTPATIENT
Start: 2021-04-28 | End: 2023-12-14

## 2021-04-28 ASSESSMENT — PAIN SCALES - GENERAL: PAINLEVEL: NO PAIN (0)

## 2021-04-28 NOTE — NURSING NOTE
Dermatology Rooming Note    Supriya Herr's goals for this visit include:   Chief Complaint   Patient presents with     Derm Problem     Ischemic ulcer of the R 3rd finger - Supriya states there has been improvement      Jv Zapien CMA
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
## 2021-04-28 NOTE — TELEPHONE ENCOUNTER
Per BARRYT PENELOPE, called [facility] dialysis and spoke with charge RN.  Relayed info that pt was recently seen in clinic for follow up of steal syndrome.  Per PENELOPE, steal syndrome appears to be improving.  Asked charge RN to continue to monitor and let us know if symptoms start to return.  Charge RN verbalized understanding and stated she would call us with any issues or concerns.    KIRTI GRESHAM RN on 4/28/2021 at 11:16 AM  Dialysis Access Care Coordinator  Phone: [phone]
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
## 2021-04-29 ENCOUNTER — TELEPHONE (OUTPATIENT)
Dept: ORTHOPEDICS | Facility: CLINIC | Age: 71
End: 2021-04-29

## 2021-04-29 NOTE — TELEPHONE ENCOUNTER
Patient has an urgent order to see a hand specialist for digital ischemic ulcer 2/2 steal phenomenon related to dialysis fistula, now s/p fistula banding, please eval digital ulcer and consider amputation if indicated.  No available openings to writer.  Please contact patient to triage and get scheduled in appropriate timeframe.

## 2021-04-30 DIAGNOSIS — E78.00 HYPERCHOLESTEROLEMIA: ICD-10-CM

## 2021-04-30 RX ORDER — ATORVASTATIN CALCIUM 20 MG/1
TABLET, FILM COATED ORAL
Qty: 90 TABLET | Refills: 0 | Status: SHIPPED | OUTPATIENT
Start: 2021-04-30 | End: 2021-07-25

## 2021-04-30 NOTE — TELEPHONE ENCOUNTER
RECORDS RECEIVED FROM: ulcer eval of finger, previous fistula banding pocedure.    DATE RECEIVED: May 5, 2021     NOTES STATUS DETAILS   OFFICE NOTE from referring provider Internal    OFFICE NOTE from other specialist N/A    DISCHARGE SUMMARY from hospital N/A    DISCHARGE REPORT from the ER N/A    OPERATIVE REPORT Internal    MEDICATION LIST Internal    IMPLANT RECORD/STICKER N/A    LABS     CBC/DIFF N/A    CULTURES N/A    INJECTIONS DONE IN RADIOLOGY N/A    MRI N/A    CT SCAN N/A    XRAYS (IMAGES & REPORTS) Internal    TUMOR     PATHOLOGY  Slides & report N/A    04/30/21   2:55 PM   COMPLETE  Tona Wan CMA

## 2021-04-30 NOTE — TELEPHONE ENCOUNTER
"Requested Prescriptions   Pending Prescriptions Disp Refills     atorvastatin (LIPITOR) 20 MG tablet [Pharmacy Med Name: ATORVASTATIN 20 MG TABLET] 90 tablet 0     Sig: TAKE 1 TABLET BY MOUTH EVERY DAY IN THE EVENING       Statins Protocol Failed - 4/30/2021 12:30 AM        Failed - LDL on file in past 12 months     Recent Labs   Lab Test 01/17/20  1204   LDL 74             Passed - No abnormal creatine kinase in past 12 months     Recent Labs   Lab Test 11/05/17  0903   CKT 18*                Passed - Recent (12 mo) or future (30 days) visit within the authorizing provider's specialty     Patient has had an office visit with the authorizing provider or a provider within the authorizing providers department within the previous 12 mos or has a future within next 30 days. See \"Patient Info\" tab in inbasket, or \"Choose Columns\" in Meds & Orders section of the refill encounter.              Passed - Medication is active on med list        Passed - Patient is age 18 or older        Passed - No active pregnancy on record        Passed - No positive pregnancy test in past 12 months         Routing refill request to provider for review/approval because:  Labs not current:  Lipid  Lab cued  Hiral Grimaldo RN   [facility]       "

## 2021-05-04 ENCOUNTER — TELEPHONE (OUTPATIENT)
Dept: ENDOCRINOLOGY | Facility: CLINIC | Age: 71
End: 2021-05-04

## 2021-05-04 NOTE — TELEPHONE ENCOUNTER
I left a message for Supriya and her daughter to check with Dialysis on the Freestyle sensor placement on the back of the arm  Changing every 14 days if it would be ok without causing fistula issues. It would take the place of  finger pokes . I left my number for them to let me know if they  want an order sent in. Kerri Jimenez RN on 5/4/2021 at 8:46 AM

## 2021-05-04 NOTE — TELEPHONE ENCOUNTER
----- Message from Arabella Kamara PA-C sent at 5/3/2021  1:24 PM CDT -----  I don't think a Freestyle Libre2 sensor on the back of the arm who hurt the dialysis fistulas.  I'll leave it up to them if they want to try a sensor. Just thought it would eliminate a lot of fingersticks for patient.  Thanks,  Twila   ----- Message -----  From: eKrri Jimenez RN  Sent: 5/3/2021  12:20 PM CDT  To: Arabella Kamara PA-C    I spoke with her daughter who tells me she has Dialysis ports on both arms now.  Generally  applying anything to the arm  is not a good idea . What are your thoughts? Kerri WOMACK  ----- Message -----  From: Arabella Kamara PA-C  Sent: 4/24/2021   5:22 PM CDT  To: SHANTA Gutierres:  Could you please call pt next week and ask her if she would still be interested in using a Freestyle Avery sensor. Medicare has been better at covering the sensors now.  If she would like me to send a RX for a Freestyle Libre2 sensor, please let me know.  Thanks  Twila

## 2021-05-05 ENCOUNTER — PRE VISIT (OUTPATIENT)
Dept: ORTHOPEDICS | Facility: CLINIC | Age: 71
End: 2021-05-05

## 2021-05-05 ENCOUNTER — OFFICE VISIT (OUTPATIENT)
Dept: ORTHOPEDICS | Facility: CLINIC | Age: 71
End: 2021-05-05
Payer: MEDICARE

## 2021-05-05 DIAGNOSIS — L98.499: ICD-10-CM

## 2021-05-05 PROCEDURE — 99203 OFFICE O/P NEW LOW 30 MIN: CPT | Performed by: ORTHOPAEDIC SURGERY
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
## 2021-05-05 NOTE — NURSING NOTE
Reason For Visit:   Chief Complaint   Patient presents with     Consult For     Right 3rd finger ulcer       Primary MD: Noam Jackson    ?  No    Age: 71 year old    Date of surgery: NA  Type of surgery: NA.  Smoker: No  Request smoking cessation information: No      There were no vitals taken for this visit.      Pain Assessment  Patient Currently in Pain: Denies  Primary Pain Location: Finger (Comment which one)(Right middle finger)               QuickDASH Assessment  No flowsheet data found.       Allergies   Allergen Reactions     Penicillins Rash     Unasyn Rash     No evidence SJS, but very uncomfortable and precipitated multiple provider visits. Would not use penicillins again if other options available.        Ellen Velazquez, ATC
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
## 2021-05-17 ENCOUNTER — OFFICE VISIT (OUTPATIENT)
Dept: TRANSPLANT | Facility: CLINIC | Age: 71
End: 2021-05-17
Attending: SURGERY
Payer: MEDICARE

## 2021-05-17 VITALS — SYSTOLIC BLOOD PRESSURE: 135 MMHG | OXYGEN SATURATION: 95 % | DIASTOLIC BLOOD PRESSURE: 65 MMHG | HEART RATE: 63 BPM

## 2021-05-17 DIAGNOSIS — Z09 FOLLOW-UP EXAMINATION AFTER VASCULAR SURGERY: Primary | ICD-10-CM

## 2021-05-17 PROCEDURE — 99024 POSTOP FOLLOW-UP VISIT: CPT | Performed by: SURGERY

## 2021-05-17 ASSESSMENT — PAIN SCALES - GENERAL: PAINLEVEL: NO PAIN (0)
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
## 2021-06-01 DIAGNOSIS — E55.9 VITAMIN D DEFICIENCY: ICD-10-CM

## 2021-06-02 RX ORDER — CHOLECALCIFEROL (VITAMIN D3) 50 MCG
50 TABLET ORAL DAILY
Qty: 100 TABLET | Refills: 3 | Status: SHIPPED | OUTPATIENT
Start: 2021-06-02 | End: 2022-06-08

## 2021-06-04 ENCOUNTER — OFFICE VISIT (OUTPATIENT)
Dept: DERMATOLOGY | Facility: CLINIC | Age: 71
End: 2021-06-04
Payer: MEDICARE

## 2021-06-04 DIAGNOSIS — L98.499: Primary | ICD-10-CM

## 2021-06-04 DIAGNOSIS — L40.8 INVERSE PSORIASIS: ICD-10-CM

## 2021-06-04 PROCEDURE — 99213 OFFICE O/P EST LOW 20 MIN: CPT | Performed by: DERMATOLOGY

## 2021-06-04 ASSESSMENT — PAIN SCALES - GENERAL: PAINLEVEL: NO PAIN (0)
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
## 2021-06-04 NOTE — NURSING NOTE
Dermatology Rooming Note    Supriya Herr's goals for this visit include:   Chief Complaint   Patient presents with     Derm Problem     supriya is coming in today for a follow up on the finger, states that things are getting better and smaller     Hilary Feng CMA on 6/4/2021 at 2:44 PM

## 2021-06-11 ENCOUNTER — OFFICE VISIT (OUTPATIENT)
Dept: ORTHOPEDICS | Facility: CLINIC | Age: 71
End: 2021-06-11
Payer: MEDICARE

## 2021-06-11 DIAGNOSIS — B35.3 TINEA PEDIS OF RIGHT FOOT: Primary | ICD-10-CM

## 2021-06-11 DIAGNOSIS — B35.1 OM (ONYCHOMYCOSIS): ICD-10-CM

## 2021-06-11 DIAGNOSIS — E11.49 TYPE II OR UNSPECIFIED TYPE DIABETES MELLITUS WITH NEUROLOGICAL MANIFESTATIONS, NOT STATED AS UNCONTROLLED(250.60) (H): ICD-10-CM

## 2021-06-11 PROCEDURE — 99213 OFFICE O/P EST LOW 20 MIN: CPT | Performed by: PODIATRIST

## 2021-06-11 RX ORDER — CICLOPIROX OLAMINE 7.7 MG/G
CREAM TOPICAL 2 TIMES DAILY
Qty: 90 G | Refills: 1 | Status: SHIPPED | OUTPATIENT
Start: 2021-06-11 | End: 2021-12-09

## 2021-06-11 NOTE — PROGRESS NOTES
Chief Complaint   Patient presents with     Follow Up     diabetic foot exam                Allergies   Allergen Reactions     Penicillins Rash     Unasyn Rash     No evidence SJS, but very uncomfortable and precipitated multiple provider visits. Would not use penicillins again if other options available.          Subjective: Supriya is a 69 year old female who presents to the clinic today for a diabetic foot exam and management.  Her nails do get long.       Objective    Hemoglobin A1C   Date Value Ref Range Status   04/09/2021 6.2 (H) 0 - 5.6 % Final     Comment:     Normal <5.7% Prediabetes 5.7-6.4%  Diabetes 6.5% or higher - adopted from ADA   consensus guidelines.           Non-palpable DP and PT pulses BL.   Equinus noted BL. Pes planus with rigid toe deformities noted to lesser digits on the right. Left AKA noted.   Nails are thickened, discolored, elongated, with subungual debris consistent with onychomycosis.          Assessment: DM2 with left AKA and neuropathy - presenting for a diabetic foot exam.   Onychomycosis.   Tyloma     Plan:   - Pt seen and evaluated  - Nails debrided x 5.  - Tyloma debrided x 1  - Cont compression socks.  - Silicone toe sleeve to the 3rd digit.   - See again in 3 months.

## 2021-06-11 NOTE — LETTER
6/11/2021     RE: Supriya Herr  [address]  [facility] [address]        Dear Colleague,    Thank you for referring your patient, Supriya Herr, to the [facility] [place]. Please see a copy of my visit note below.    Chief Complaint   Patient presents with     Follow Up     diabetic foot exam                Allergies   Allergen Reactions     Penicillins Rash     Unasyn Rash     No evidence SJS, but very uncomfortable and precipitated multiple provider visits. Would not use penicillins again if other options available.          Subjective: Supriya is a 69 year old female who presents to the clinic today for a diabetic foot exam and management.  Her nails do get long.       Objective    Hemoglobin A1C   Date Value Ref Range Status   04/09/2021 6.2 (H) 0 - 5.6 % Final     Comment:     Normal <5.7% Prediabetes 5.7-6.4%  Diabetes 6.5% or higher - adopted from ADA   consensus guidelines.           Non-palpable DP and PT pulses BL.   Equinus noted BL. Pes planus with rigid toe deformities noted to lesser digits on the right. Left AKA noted.   Nails are thickened, discolored, elongated, with subungual debris consistent with onychomycosis.          Assessment: DM2 with left AKA and neuropathy - presenting for a diabetic foot exam.   Onychomycosis.   Tyloma     Plan:   - Pt seen and evaluated  - Nails debrided x 5.  - Tyloma debrided x 1  - Cont compression socks.  - Silicone toe sleeve to the 3rd digit.   - See again in 3 months.    Again, thank you for allowing me to participate in the care of your patient.      Sincerely,      Eleazar Pack DPM

## 2021-07-13 DIAGNOSIS — T82.898A OTHER SPECIFIED COMPLICATION OF VASCULAR PROSTHETIC DEVICES, IMPLANTS AND GRAFTS, INITIAL ENCOUNTER (H): ICD-10-CM

## 2021-07-13 DIAGNOSIS — Z09 FOLLOW-UP EXAMINATION AFTER VASCULAR SURGERY: Primary | ICD-10-CM

## 2021-07-21 ENCOUNTER — ANCILLARY PROCEDURE (OUTPATIENT)
Dept: ULTRASOUND IMAGING | Facility: CLINIC | Age: 71
End: 2021-07-21
Attending: NURSE PRACTITIONER
Payer: MEDICARE

## 2021-07-21 DIAGNOSIS — T82.898A OTHER SPECIFIED COMPLICATION OF VASCULAR PROSTHETIC DEVICES, IMPLANTS AND GRAFTS, INITIAL ENCOUNTER (H): ICD-10-CM

## 2021-07-21 DIAGNOSIS — Z09 FOLLOW-UP EXAMINATION AFTER VASCULAR SURGERY: ICD-10-CM

## 2021-07-21 PROCEDURE — 93990 DOPPLER FLOW TESTING: CPT | Performed by: RADIOLOGY

## 2021-07-23 ENCOUNTER — OFFICE VISIT (OUTPATIENT)
Dept: FAMILY MEDICINE | Facility: CLINIC | Age: 71
End: 2021-07-23
Payer: MEDICARE

## 2021-07-23 ENCOUNTER — MEDICAL CORRESPONDENCE (OUTPATIENT)
Dept: HEALTH INFORMATION MANAGEMENT | Facility: CLINIC | Age: 71
End: 2021-07-23

## 2021-07-23 ENCOUNTER — TRANSFERRED RECORDS (OUTPATIENT)
Dept: HEALTH INFORMATION MANAGEMENT | Facility: CLINIC | Age: 71
End: 2021-07-23

## 2021-07-23 VITALS
SYSTOLIC BLOOD PRESSURE: 130 MMHG | TEMPERATURE: 97.4 F | HEART RATE: 66 BPM | OXYGEN SATURATION: 100 % | DIASTOLIC BLOOD PRESSURE: 68 MMHG

## 2021-07-23 DIAGNOSIS — Z99.2 ESRD ON DIALYSIS (H): ICD-10-CM

## 2021-07-23 DIAGNOSIS — L89.301: Primary | ICD-10-CM

## 2021-07-23 DIAGNOSIS — Z99.3 WHEELCHAIR BOUND: ICD-10-CM

## 2021-07-23 DIAGNOSIS — N18.6 ESRD ON DIALYSIS (H): ICD-10-CM

## 2021-07-23 DIAGNOSIS — L89.302: ICD-10-CM

## 2021-07-23 PROCEDURE — 99214 OFFICE O/P EST MOD 30 MIN: CPT | Performed by: FAMILY MEDICINE

## 2021-07-23 RX ORDER — SULFAMETHOXAZOLE/TRIMETHOPRIM 800-160 MG
1 TABLET ORAL 2 TIMES DAILY
Qty: 20 TABLET | Refills: 0 | Status: SHIPPED | OUTPATIENT
Start: 2021-07-23 | End: 2021-08-02

## 2021-07-23 NOTE — PROGRESS NOTES
"    Assessment & Plan     Decubitus ulcer of ischium, stage 1 and 2 , unspecified laterality  A reddened, painful area on the skin that does not turn white when pressed  From what I can see these are shallow stage 1 wounds    use   - Pressure Relief Cushion Order for DME - ONLY FOR DME; Future  - Hospital Bed Order for DME - ONLY FOR DME  - sulfamethoxazole-trimethoprim (BACTRIM DS) 800-160 MG tablet; Take 1 tablet by mouth 2 times daily for 10 days  - Wound Care Referral; Future  See wound care asap  Relieve the pressure on the area.    Use special pillows, foam cushions  or   pads to reduce the pressure.    Change positions often. If you are in a wheelchair, try to change your position every 15 minutes. If you are in bed, you should be moved about every 2 hours.      patient needs hospital bed :       Requires double sided  bilateral side rails d/t amputation to enable transfers, or shortness of breath due to CHF so needs the head of bed to elevate,     For a stage I sore, you can wash the area gently with mild soap and water. If needed, use a moisture barrier to protect the area from bodily fluids.      Needs hospital bed to both  heal her decubitus ulcers, assist in postioning and bed mobility     BMI:   Estimated body mass index is 31.63 kg/m  as calculated from the following:    Height as of 4/16/21: 1.727 m (5' 8\").    Weight as of 4/26/21: 94.3 kg (208 lb).         See Patient Instructions    No follow-ups on file.    Noam Jackson MD  [facility]    Jean Carlos Epps is a 71 year old who presents for the following health issues     HPI       Patient is here with her daughter for concerns of possible sores on her bottom from prolonged sitting in her wheel chair , also has pain on the right side of her mouth that hurts when she opens her mouth . Also inquiring about ordering a new bed if possible .      Chronic Kidney Disease Follow-up      Name Primary?     Decubitus ulcer " of ischium, stage 1, unspecified laterality Yes     ESRD on dialysis (H)      Wheelchair bound               Review of Systems   Constitutional, HEENT, cardiovascular, pulmonary, gi and gu systems are negative, except as otherwise noted.      Objective    /68   Pulse 66   Temp 97.4  F (36.3  C) (Temporal)   SpO2 100%   There is no height or weight on file to calculate BMI.  Physical Exam   GENERAL: alert, elderly and fatigued  RESP: lungs clear to auscultation - no rales, rhonchi or wheezes  SKIN: seevral shallow pink/ white skins tears vs stage 1-2 pressure soers on both isheal crest/ buttocks

## 2021-07-24 DIAGNOSIS — E78.00 HYPERCHOLESTEROLEMIA: ICD-10-CM

## 2021-07-25 RX ORDER — ATORVASTATIN CALCIUM 20 MG/1
TABLET, FILM COATED ORAL
Qty: 90 TABLET | Refills: 0 | Status: SHIPPED | OUTPATIENT
Start: 2021-07-25 | End: 2021-10-21

## 2021-07-26 ENCOUNTER — TELEPHONE (OUTPATIENT)
Dept: WOUND CARE | Facility: CLINIC | Age: 71
End: 2021-07-26

## 2021-07-26 DIAGNOSIS — I10 HYPERTENSION GOAL BP (BLOOD PRESSURE) < 140/90: ICD-10-CM

## 2021-07-26 DIAGNOSIS — E11.3213 TYPE 2 DIABETES MELLITUS WITH MILD NONPROLIFERATIVE RETINOPATHY OF BOTH EYES AND MACULAR EDEMA, UNSPECIFIED WHETHER LONG TERM INSULIN USE (H): Primary | ICD-10-CM

## 2021-07-26 RX ORDER — AMLODIPINE BESYLATE 5 MG/1
5 TABLET ORAL 2 TIMES DAILY
Qty: 180 TABLET | Refills: 3 | Status: SHIPPED | OUTPATIENT
Start: 2021-07-26 | End: 2022-03-14

## 2021-07-26 NOTE — TELEPHONE ENCOUNTER
"Referall received from work queue to be seen for \"Decubitus ulcer of ischium, stage 1 and 2 , unspecified laterality\".  "

## 2021-07-28 ENCOUNTER — OFFICE VISIT (OUTPATIENT)
Dept: OPHTHALMOLOGY | Facility: CLINIC | Age: 71
End: 2021-07-28
Attending: OPHTHALMOLOGY
Payer: MEDICARE

## 2021-07-28 DIAGNOSIS — E11.3213 TYPE 2 DIABETES MELLITUS WITH MILD NONPROLIFERATIVE RETINOPATHY OF BOTH EYES AND MACULAR EDEMA, UNSPECIFIED WHETHER LONG TERM INSULIN USE (H): ICD-10-CM

## 2021-07-28 PROCEDURE — 99213 OFFICE O/P EST LOW 20 MIN: CPT | Mod: GC | Performed by: OPHTHALMOLOGY

## 2021-07-28 PROCEDURE — G0463 HOSPITAL OUTPT CLINIC VISIT: HCPCS

## 2021-07-28 ASSESSMENT — CUP TO DISC RATIO
OS_RATIO: 0.3
OD_RATIO: 0.3

## 2021-07-28 ASSESSMENT — VISUAL ACUITY
OD_CC: 20/25
OD_CC+: +1
OS_CC: 20/30
METHOD: SNELLEN - LINEAR
OS_CC+: -2
CORRECTION_TYPE: GLASSES

## 2021-07-28 ASSESSMENT — TONOMETRY
OS_IOP_MMHG: 08
OD_IOP_MMHG: 08
IOP_METHOD: TONOPEN

## 2021-07-28 ASSESSMENT — REFRACTION_WEARINGRX
OD_CYLINDER: +1.50
OS_SPHERE: -3.50
OD_AXIS: 093
OD_AXIS: 075
OS_CYLINDER: +1.75
OD_SPHERE: -1.00
OD_SPHERE: -5.25
OS_SPHERE: -4.00
OD_CYLINDER: +1.00
OS_AXIS: 135
OS_CYLINDER: +1.75

## 2021-07-28 ASSESSMENT — CONF VISUAL FIELD
OS_NORMAL: 1
OD_NORMAL: 1

## 2021-07-28 ASSESSMENT — SLIT LAMP EXAM - LIDS
COMMENTS: NORMAL
COMMENTS: NORMAL

## 2021-07-28 NOTE — NURSING NOTE
Chief Complaints and History of Present Illnesses   Patient presents with     Diabetic Retinopathy Follow Up     Chief Complaint(s) and History of Present Illness(es)     Diabetic Retinopathy Follow Up     Laterality: both eyes    Onset: 4 months ago              Comments     Pt. States that she has had some watering and green discharge RE. No pain BE. No newflashes or floaters BE.  Lelo Silva COT 3:13 PM July 28, 2021

## 2021-07-28 NOTE — PROGRESS NOTES
CC: follow upDiabetic retinopathy   HPI: 71 year old here for NPDR and DME. [facility] stable    Imaging:  Optical Coherence Tomography: 7-28-21  right eye: microaneurysms and cystoid macular edema - improved, but lamellar hole, slighlty larger  Left eye: mild inferior IRF, MA - stable    fluorescein angiography: 10/28/20   Right eye: blockage of fluorescein angiography on the areas of heme; few microaneurysms; mild late Diabetic macular edema and PP leakage  No neovascularization elsewhere; no neovascularization of the disc.  Left eye: few microaneurysms; mild late Diabetic macular edema; staining of the peripheral Chorioretinal  scars    Assessment & Plan:  1. Moderate nonproliferative diabetic retinopathy and mild - Diabetic macular edema both eyes  - mild stable edema in right eye, stable in left eye   Stable- observe  A1c 4/2021 6.2    3. right eye - retinal macroaneurysm   at the sup temp margin of disc may contribute to macular edema;   - MA seems to be getting thrombosed   - status post avastin inj x2 for cystoid macular edema with improvement  - Last DOROTEO 8/15/19 (#1)  - now stable mild cystoid macular edema- will observe. Consider avastin if worsen    4. Mod Hypertensive retinopathy   - Stage 5 kidney disease  - Considering HD   - blood pressure control has been poor in 160s/90s-100s  - Likely contributing to macular edema    5. Pseudophakia both eyes  8-11-20 right eye   Patient happy with her vision     6. History of Macula hole repair left eye by Dr. Daniel  S/p PPV, mp, air-fluid exchange, SF6 gas infusion, left eye 2/14/2012 by Dr. Daniel    Macula hole closed  Observe    7. Dry eye syndrome   Status post punctal plugs   artificial tears  As needed and warm compresses     PLAN:  - Blood pressure (<120/80) and blood glucose (HbA1c <7.0) control discussed with patient.   - Patient advised that failure to adequately control each may lead to vision loss. The patient expressed understanding.- improved on exam  today   - follow up in 4 months with Optical Coherence Tomography with fluorescein angiography transits od    Zulma Stein MD  PGY-3 Resident Physician  Department of Ophthalmology    ~~~~~~~~~~~~~~~~~~~~~~~~~~~~~~~~~~   Complete documentation of historical and exam elements from today's encounter can be found in the full encounter summary report (not reduplicated in this progress note).  I personally obtained the chief complaint(s) and history of present illness.  I confirmed and edited as necessary the review of systems, past medical/surgical history, family history, social history, and examination findings as documented by others; and I examined the patient myself.  I personally reviewed the relevant tests, images, and reports as documented above.  I personally reviewed the ophthalmic test(s) associated with this encounter, agree with the interpretation(s) as documented by the resident/fellow, and have edited the corresponding report(s) as necessary.   I formulated and edited as necessary the assessment and plan and discussed the findings and management plan with the patient and family    Leanne Jett MD   of Ophthalmology.  Retina Service   Department of Ophthalmology and Visual Neurosciences   [facility]  Phone: [phone]   Fax: [phone]

## 2021-07-30 ENCOUNTER — HOSPITAL ENCOUNTER (OUTPATIENT)
Dept: WOUND CARE | Facility: CLINIC | Age: 71
Discharge: HOME | End: 2021-07-30
Attending: PHYSICIAN ASSISTANT | Admitting: PHYSICIAN ASSISTANT
Payer: MEDICARE

## 2021-07-30 VITALS
WEIGHT: 255.73 LBS | TEMPERATURE: 96.9 F | HEIGHT: 66 IN | BODY MASS INDEX: 41.1 KG/M2 | HEART RATE: 60 BPM | SYSTOLIC BLOOD PRESSURE: 135 MMHG | DIASTOLIC BLOOD PRESSURE: 66 MMHG

## 2021-07-30 DIAGNOSIS — E11.21 TYPE 2 DIABETES MELLITUS WITH DIABETIC NEPHROPATHY, WITH LONG-TERM CURRENT USE OF INSULIN (H): ICD-10-CM

## 2021-07-30 DIAGNOSIS — Z79.4 TYPE 2 DIABETES MELLITUS WITH DIABETIC NEPHROPATHY, WITH LONG-TERM CURRENT USE OF INSULIN (H): ICD-10-CM

## 2021-07-30 DIAGNOSIS — L89.151 PRESSURE INJURY OF SACRAL REGION, STAGE 1: Primary | ICD-10-CM

## 2021-07-30 DIAGNOSIS — L89.301: ICD-10-CM

## 2021-07-30 PROCEDURE — 99203 OFFICE O/P NEW LOW 30 MIN: CPT | Performed by: PHYSICIAN ASSISTANT

## 2021-07-30 PROCEDURE — G0463 HOSPITAL OUTPT CLINIC VISIT: HCPCS

## 2021-07-30 ASSESSMENT — MIFFLIN-ST. JEOR: SCORE: 1686.75

## 2021-07-30 NOTE — DISCHARGE INSTRUCTIONS
[facility]  [address] [address]    Call us at [phone] if you have any questions about your wounds, have redness or swelling around your wound, have a fever of 101 or greater or if you have any other problems or concerns. We answer the phone Monday through Friday 8 am to 4 pm, please leave a message as we check the voicemail frequently throughout the day.     Supriya Herr      1950    Wound care recommendations to bilateral gluteus:  After bathing and drying the skin well, sprinkle antifungal powder to gluteus. Apply once or twice a day.  Reduce the time spent in the chair. Try to break up the day by laying back in bed or stand for a minute or two every hour.     Caridad Boateng PA-C. July 30, 2021    Wound Clinic follow up with our clinic if any needs arise in the future.    If you had a positive experience please indicate that on your patient satisfaction survey form that [facility] will be sending you.    It was a pleasure meeting with you today.  Thank you for allowing me and my team the privilege of caring for you today.  YOU are the reason we are here, and I truly hope we provided you with the excellent service you deserve.  Please let us know if there is anything else we can do for you so that we can be sure you are leaving completely satisfied with your care experience.      If you have any billing related questions please call the [facility] Business office at [phone]. The clinic staff does not handle billing related matters.

## 2021-07-31 RX ORDER — BLOOD SUGAR DIAGNOSTIC
STRIP MISCELLANEOUS
Qty: 400 STRIP | Refills: 3 | Status: SHIPPED | OUTPATIENT
Start: 2021-07-31 | End: 2022-08-10

## 2021-07-31 NOTE — TELEPHONE ENCOUNTER
4/23/2021  [facility] [place]   Arabella Kamara PA-C  Endocrinology, Diabetes, and Metabolism    Test strips

## 2021-08-05 NOTE — PROGRESS NOTES
[facility]      OUTPATIENT PHYSICAL THERAPY EVALUATION  PLAN OF TREATMENT FOR OUTPATIENT REHABILITATION  (COMPLETE FOR INITIAL CLAIMS ONLY)  Patient's Last Name, First Name, M.I.  YOB: 1950  Supriya Herr                        Provider's Name  [facility] Medical Record No.  [id number]                               Onset Date:  04/16/21   Start of Care Date:    4/17/2021     Type:     _X_PT   ___OT   ___SLP Medical Diagnosis:                           PT Diagnosis:  Impaired functional mobility d/t R UE surgery and deconditioning   Visits from SOC:  1   _________________________________________________________________________________  Plan of Treatment/Functional Goals    Planned Interventions: transfer training     Goals: See Physical Therapy Goals on Care Plan in [facility] electronic health record.    Therapy Frequency: One time eval and treatment only  Predicted Duration of Therapy Intervention:  No follow up visits  _________________________________________________________________________________    I CERTIFY THE NEED FOR THESE SERVICES FURNISHED UNDER        THIS PLAN OF TREATMENT AND WHILE UNDER MY CARE     (Physician co-signature of this document indicates review and certification of the therapy plan).                 ,      Referring Physician: Kennedy Banks MD            Initial Assessment        See Physical Therapy evaluation dated   in Epic electronic health record.

## 2021-08-07 ENCOUNTER — TELEPHONE (OUTPATIENT)
Dept: ENDOCRINOLOGY | Facility: CLINIC | Age: 71
End: 2021-08-07

## 2021-08-11 DIAGNOSIS — I10 ESSENTIAL HYPERTENSION: ICD-10-CM

## 2021-08-11 NOTE — PROGRESS NOTES
[facility]      OUTPATIENT PHYSICAL THERAPY EVALUATION  PLAN OF TREATMENT FOR OUTPATIENT REHABILITATION  (COMPLETE FOR INITIAL CLAIMS ONLY)  Patient's Last Name, First Name, M.I.  YOB: 1950  Supriya Herr                        Provider's Name  [facility] Medical Record No.  [id number]                               Onset Date:  04/16/21   Start of Care Date:         Type:     _X_PT   ___OT   ___SLP Medical Diagnosis:                           PT Diagnosis:  Impaired functional mobility d/t R UE surgery and deconditioning   Visits from SOC:  1   _________________________________________________________________________________  Plan of Treatment/Functional Goals    Planned Interventions: transfer training     Goals: See Physical Therapy Goals on Care Plan in [facility] electronic health record.    Therapy Frequency: One time eval and treatment only  Predicted Duration of Therapy Intervention:    _________________________________________________________________________________    I CERTIFY THE NEED FOR THESE SERVICES FURNISHED UNDER        THIS PLAN OF TREATMENT AND WHILE UNDER MY CARE     (Physician co-signature of this document indicates review and certification of the therapy plan).                 ,      Referring Physician: Kennedy Banks MD            Initial Assessment        See Physical Therapy evaluation dated   in Epic electronic health record.

## 2021-08-12 RX ORDER — CARVEDILOL 25 MG/1
25 TABLET ORAL 2 TIMES DAILY WITH MEALS
Qty: 180 TABLET | Refills: 3 | Status: SHIPPED | OUTPATIENT
Start: 2021-08-12 | End: 2022-07-26

## 2021-08-16 ENCOUNTER — APPOINTMENT (OUTPATIENT)
Dept: GENERAL RADIOLOGY | Facility: CLINIC | Age: 71
DRG: 177 | End: 2021-08-16
Attending: EMERGENCY MEDICINE
Payer: MEDICARE

## 2021-08-16 ENCOUNTER — NURSE TRIAGE (OUTPATIENT)
Dept: NURSING | Facility: CLINIC | Age: 71
End: 2021-08-16

## 2021-08-16 ENCOUNTER — HOSPITAL ENCOUNTER (INPATIENT)
Facility: CLINIC | Age: 71
LOS: 7 days | Discharge: HOME | DRG: 177 | End: 2021-08-24
Attending: EMERGENCY MEDICINE | Admitting: HOSPITALIST
Payer: MEDICARE

## 2021-08-16 DIAGNOSIS — U07.1 INFECTION DUE TO 2019 NOVEL CORONAVIRUS: ICD-10-CM

## 2021-08-16 DIAGNOSIS — J12.82 PNEUMONIA DUE TO COVID-19 VIRUS: ICD-10-CM

## 2021-08-16 DIAGNOSIS — L30.4 INTERTRIGO: ICD-10-CM

## 2021-08-16 DIAGNOSIS — J18.9 COMMUNITY ACQUIRED PNEUMONIA, UNSPECIFIED LATERALITY: Primary | ICD-10-CM

## 2021-08-16 DIAGNOSIS — U07.1 PNEUMONIA DUE TO COVID-19 VIRUS: ICD-10-CM

## 2021-08-16 DIAGNOSIS — R11.0 NAUSEA: ICD-10-CM

## 2021-08-16 LAB
ANION GAP SERPL CALCULATED.3IONS-SCNC: 6 MMOL/L (ref 3–14)
BASOPHILS # BLD AUTO: 0 10E3/UL (ref 0–0.2)
BASOPHILS NFR BLD AUTO: 0 %
BUN SERPL-MCNC: 28 MG/DL (ref 7–30)
CALCIUM SERPL-MCNC: 8.8 MG/DL (ref 8.5–10.1)
CHLORIDE BLD-SCNC: 105 MMOL/L (ref 94–109)
CO2 SERPL-SCNC: 27 MMOL/L (ref 20–32)
CREAT SERPL-MCNC: 3.93 MG/DL (ref 0.52–1.04)
EOSINOPHIL # BLD AUTO: 0.2 10E3/UL (ref 0–0.7)
EOSINOPHIL NFR BLD AUTO: 3 %
ERYTHROCYTE [DISTWIDTH] IN BLOOD BY AUTOMATED COUNT: 12.1 % (ref 10–15)
GFR SERPL CREATININE-BSD FRML MDRD: 11 ML/MIN/1.73M2
GLUCOSE BLD-MCNC: 152 MG/DL (ref 70–99)
HCT VFR BLD AUTO: 31.2 % (ref 35–47)
HGB BLD-MCNC: 10.1 G/DL (ref 11.7–15.7)
HOLD SPECIMEN: NORMAL
IMM GRANULOCYTES # BLD: 0 10E3/UL
IMM GRANULOCYTES NFR BLD: 0 %
LYMPHOCYTES # BLD AUTO: 0.9 10E3/UL (ref 0.8–5.3)
LYMPHOCYTES NFR BLD AUTO: 18 %
MCH RBC QN AUTO: 32.4 PG (ref 26.5–33)
MCHC RBC AUTO-ENTMCNC: 32.4 G/DL (ref 31.5–36.5)
MCV RBC AUTO: 100 FL (ref 78–100)
MONOCYTES # BLD AUTO: 0.7 10E3/UL (ref 0–1.3)
MONOCYTES NFR BLD AUTO: 14 %
NEUTROPHILS # BLD AUTO: 3 10E3/UL (ref 1.6–8.3)
NEUTROPHILS NFR BLD AUTO: 65 %
NRBC # BLD AUTO: 0 10E3/UL
NRBC BLD AUTO-RTO: 0 /100
PLATELET # BLD AUTO: 144 10E3/UL (ref 150–450)
POTASSIUM BLD-SCNC: 3.8 MMOL/L (ref 3.4–5.3)
RBC # BLD AUTO: 3.12 10E6/UL (ref 3.8–5.2)
SODIUM SERPL-SCNC: 138 MMOL/L (ref 133–144)
WBC # BLD AUTO: 4.8 10E3/UL (ref 4–11)

## 2021-08-16 PROCEDURE — 85004 AUTOMATED DIFF WBC COUNT: CPT | Performed by: EMERGENCY MEDICINE

## 2021-08-16 PROCEDURE — 99220 PR INITIAL OBSERVATION CARE,LEVEL III: CPT | Performed by: HOSPITALIST

## 2021-08-16 PROCEDURE — G0378 HOSPITAL OBSERVATION PER HR: HCPCS

## 2021-08-16 PROCEDURE — 96374 THER/PROPH/DIAG INJ IV PUSH: CPT

## 2021-08-16 PROCEDURE — 71045 X-RAY EXAM CHEST 1 VIEW: CPT

## 2021-08-16 PROCEDURE — 250N000013 HC RX MED GY IP 250 OP 250 PS 637: Performed by: EMERGENCY MEDICINE

## 2021-08-16 PROCEDURE — 80048 BASIC METABOLIC PNL TOTAL CA: CPT | Performed by: EMERGENCY MEDICINE

## 2021-08-16 PROCEDURE — 250N000011 HC RX IP 250 OP 636

## 2021-08-16 PROCEDURE — 36415 COLL VENOUS BLD VENIPUNCTURE: CPT | Performed by: EMERGENCY MEDICINE

## 2021-08-16 PROCEDURE — 99285 EMERGENCY DEPT VISIT HI MDM: CPT | Mod: 25

## 2021-08-16 RX ORDER — ONDANSETRON 2 MG/ML
4 INJECTION INTRAMUSCULAR; INTRAVENOUS ONCE
Status: COMPLETED | OUTPATIENT
Start: 2021-08-16 | End: 2021-08-16

## 2021-08-16 RX ORDER — HYDROCODONE BITARTRATE AND ACETAMINOPHEN 5; 325 MG/1; MG/1
1 TABLET ORAL ONCE
Status: COMPLETED | OUTPATIENT
Start: 2021-08-16 | End: 2021-08-16

## 2021-08-16 RX ORDER — ONDANSETRON 4 MG/1
4 TABLET, ORALLY DISINTEGRATING ORAL EVERY 8 HOURS PRN
Qty: 10 TABLET | Refills: 0 | Status: SHIPPED | OUTPATIENT
Start: 2021-08-16 | End: 2021-08-23

## 2021-08-16 RX ORDER — ONDANSETRON 2 MG/ML
INJECTION INTRAMUSCULAR; INTRAVENOUS
Status: COMPLETED
Start: 2021-08-16 | End: 2021-08-16

## 2021-08-16 RX ADMIN — ONDANSETRON 4 MG: 2 INJECTION INTRAMUSCULAR; INTRAVENOUS at 18:13

## 2021-08-16 RX ADMIN — HYDROCODONE BITARTRATE AND ACETAMINOPHEN 1 TABLET: 5; 325 TABLET ORAL at 19:40

## 2021-08-16 ASSESSMENT — ENCOUNTER SYMPTOMS
HEADACHES: 1
NAUSEA: 1
MYALGIAS: 1
WEAKNESS: 1
COUGH: 1

## 2021-08-16 ASSESSMENT — MIFFLIN-ST. JEOR: SCORE: 1731.05

## 2021-08-16 NOTE — TELEPHONE ENCOUNTER
Daughter of patient calling. Patient has Covid and went to dialysis. Now patient is kind of out of it, not answering questions appropriately and too weak to transfer. Daughter had to lower her to the ground. Patient not responding appropriately.   Breathing is okay.   Protocol recommends to call 911.   Daughter agrees to hang up with me and call 911 for EMS to assist.       Reason for Disposition    Dehydration, severe (e.g., cold/pale/clammy skin, too weak to stand)    Additional Information    Negative: CARDIAC ARREST suspected    Negative: Breathing stopped    Negative: Anaphylactic reaction (life-threatening allergic reaction)    Negative: Asthma attack, severe (e.g., struggling for each breath, unable to speak)    Negative: Bleeding (severe) from skin AND can't be stopped    Negative: Bleeding (severe) from nose, mouth, vomiting, anus, vagina AND can't be stopped    Negative: Difficulty breathing, severe (e.g., struggling for each breath, unable to speak)    Negative: Burn, severe    Negative: Choking, severe (e.g., unable to breathe, talk)    Negative: Coma (e.g., unconscious, not responding to verbal or painful stimulus)    Negative: Cyanosis, widespread    Protocols used: 911 SYMPTOMS-A-[place]

## 2021-08-16 NOTE — ED PROVIDER NOTES
History   Chief Complaint:  Generalized Weakness, Shortness of Breath, Cough, and Covid Concern      HPI  Supriya Herr is a 71 year old female, previously vaccinated for Covid-19, with history of chronic kidney disease stage 5 on dialysis who presents with generalized weakness, shortness of breath, cough, and Covid-19 diagnosis. Supriya was with her daughter in [place] recently when her daughter was diagnosed with Covid-19, and then Supriya herself was diagnosed with Covid yesterday; see labs from [facility] encounter below. Her PCP recommended she receive monoclonal antibody treatment, but she has not done so yet, and has been feeling symptoms of nausea, headache, cough, and generalized body aches, so she presents to the ED. She denies taking any medications for her symptoms.    Lab results from the [facility] in [place], yesterday:  Covid-19 molecular: Positive (A!)    Review of Systems   Respiratory: Positive for cough.    Gastrointestinal: Positive for nausea.   Musculoskeletal: Positive for myalgias.   Neurological: Positive for weakness and headaches.   10 systems reviewed and negative except as above and in HPI.    Allergies:  Penicillins  Unasyn    Medications:  Albuterol inhaler  Amlodipine  Apremilast  Atorvastatin  Carvedilol  Lasix  Gabapentin  Novolog flexpen  zoloft  renvela  Tramadol    Past Medical History:  Anemia  Anxiety and depression  Basal cell carcinoma  Chronic kidney disease stage 5  Congestive heart failure  Dyslipidemia  Hyperlipidemia  Hypertension  Obesity  Psoriasis  Diabetes mellitus, type 2  Vitiligo  Steal syndrome  Peripheral vascular disease  intertrigo  Pseudophakia, left eye  Drug rash  Cellulitis  Major depressive disorder  Senile nuclear sclerosis  Macular cyst, hole, or pseudohole of retina  Stage 1 pressure ulcer  Basal cell carcinoma  Dermatophytosis of nail  Leg trauma and subsequent amputation     Past Surgical History:    Amputation, left leg  Cataract IOL,  "bilateral, + phaco of right eye  Colonoscopy  Create arteriovenous fistula, upper extremity, right  Create graft, arteriovenous, left upper extremity, bovine  Excise exostosis, foot  Repair of hole in left retina  IR dialysis, fistulogram, left x3  IR dialysis, mech thromb, with stent  IR dialysis PTA x2  Release trigger finger  Repair fistula  Retinal reattachment  Right foot surgery  Right hip surgery  vitrectomy    Family History:    Diabetes  Hypertension  Eye disorder  Arthritis x2  Obesity  Congestive heart failure  Deep vein thrombosis  cerebrovascular disease  Pacemaker  Lung disease  Cancer  Polio    Social History:  Has daughter.  Visits [place].  PCP: Noam Jackson      Physical Exam     Patient Vitals for the past 24 hrs:   BP Temp Temp src Pulse Resp SpO2 Height Weight   08/16/21 1936 (!) 150/70 -- -- 79 -- 94 % -- --   08/16/21 1800 -- -- -- -- -- 93 % -- --   08/16/21 1752 (!) 162/81 100  F (37.8  C) Oral 77 16 94 % 1.753 m (5' 9\") 115.7 kg (255 lb)   08/16/21 1749 (!) 162/81 100  F (37.8  C) Oral 81 18 94 % -- 115.7 kg (255 lb)       Physical Exam  General: Resting on gurney.  Head:  The scalp, face, and head appear normal  Mouth/Throat: Mucus membranes are moist  CV:  Regular rate    Normal S1 and S2  No pathological murmur   Resp:  No respiratory distress.    No focal coarseness.    Non-labored, no retractions or accessory muscle use    No wheezing   GI:  Abdomen is soft, no rigidity    No tenderness to palpation  MS:  Bilateral fistulas present.       Moves upper and RLE extremities equally.  LLE amputation.    Good capillary refill noted.  Skin:  No rash or lesions noted.  Neuro: Speech is normal and fluent. No apparent deficit.  Psych:  Awake. Alert.  Normal affect.      Appropriate interactions.    Emergency Department Course     Imaging:  XR Chest Port 1 View  Portable AP view of the chest was obtained. Mild  enlargement of the cardia silhouette. Atherosclerotic " vascular  calcification of the aortic knob. Subtle basilar predominant pulmonary  opacities, could be related to patient's known history of COVID-19  infection. No significant pleural effusion or pneumothorax.    SCOTTIE BLACK MD  Reading per radiology.    Laboratory:  CBC: WBC: 4.8, HGB: 10.1 (L), PLT: 144 (L)  BMP: Glucose 152 (H), Creatinine: 3.93 (H), GFR: 11 (L), o/w WNL    Emergency Department Course:    Reviewed:  I reviewed nursing notes, vitals, past medical history and care everywhere    Assessments:  1820 I obtained history and examined the patient as noted above.  2001 I rechecked the patient and explained findings.    Consults:  2152 I spoke with Dr. Arriaga, the hospitalist, who agreed to admit the patient.    Interventions:  1813 Zofran 4 mg IV  1940 Norco 5-325 mg per tablet, 1 tablet PO      Disposition:  The patient was admitted to the hospital under the care of Dr. Arriaga.      Impression & Plan     Medical Decision Making:  Supriya Herr is a 71 year old female who presents for evaluation of weakness, cough and shortness of breath.  Associated symptoms today have included nausea. The patient has known Covid-19 infection and felt better after symptomatic treatment.  Unfortunately, despite feeling better she was far too weak even to transfer to her wheel chair, and required a lift in the ED after assist x 2 was unsuccessful.  There are no signs of pneumonia or UTI causing the symptoms.  In addition, I do not believe symptoms are due to CVA, TIA, myasthesia gravis, myopathy or acute coronary syndromes and there are no indications at this point for a further workup with Covid as an etiology.  The patient remains persistently weak and is a fall risk. I spoke with the hospitalist Dr. Arriaga who graciously agreed to admit the patient for further evaluation and treatment.     Covid-19  Supriya Herr was evaluated during a global COVID-19 pandemic, which necessitated consideration that the  patient might be at risk for infection with the SARS-CoV-2 virus that causes COVID-19.   Applicable protocols for evaluation were followed during the patient's care.   COVID-19 was considered as part of the patient's evaluation. The plan for testing is:  a test was obtained at a previous visit and reviewed & considered today.    Diagnosis:     ICD-10-CM    1. Infection due to 2019 novel coronavirus  U07.1    2. Nausea  R11.0        Discharge Medications:  New Prescriptions    ONDANSETRON (ZOFRAN ODT) 4 MG ODT TAB    Take 1 tablet (4 mg) by mouth every 8 hours as needed       Scribe Disclosure:  I, Britton Mccarty, am serving as a scribe at 6:05 PM on 8/16/2021 to document services personally performed by Neeta Billy MD based on my observations and the provider's statements to me.      Neeta Billy MD  08/17/21 0045

## 2021-08-16 NOTE — ED NOTES
Bed: ED26  Expected date:   Expected time:   Means of arrival:   Comments:  [id number] 71f covid+, weakness, sob,  dialysis pt eta10

## 2021-08-16 NOTE — ED TRIAGE NOTES
Arrives from home via EMS. Multiple complaints and issues. Covid positive as of Sunny 8/15/2021.Has had generalized weakness, leg swelling, productive cough, and elevated BP's. Had a full dialysis run today.

## 2021-08-17 PROBLEM — J12.82 PNEUMONIA DUE TO COVID-19 VIRUS: Status: ACTIVE | Noted: 2021-08-17

## 2021-08-17 PROBLEM — U07.1 PNEUMONIA DUE TO COVID-19 VIRUS: Status: ACTIVE | Noted: 2021-08-17

## 2021-08-17 LAB
ALBUMIN SERPL-MCNC: 2.5 G/DL (ref 3.4–5)
ALP SERPL-CCNC: 96 U/L (ref 40–150)
ALT SERPL W P-5'-P-CCNC: 17 U/L (ref 0–50)
ANION GAP SERPL CALCULATED.3IONS-SCNC: 5 MMOL/L (ref 3–14)
AST SERPL W P-5'-P-CCNC: 14 U/L (ref 0–45)
BASOPHILS # BLD AUTO: 0 10E3/UL (ref 0–0.2)
BASOPHILS NFR BLD AUTO: 0 %
BILIRUB SERPL-MCNC: 0.4 MG/DL (ref 0.2–1.3)
BUN SERPL-MCNC: 39 MG/DL (ref 7–30)
CALCIUM SERPL-MCNC: 8.9 MG/DL (ref 8.5–10.1)
CHLORIDE BLD-SCNC: 107 MMOL/L (ref 94–109)
CO2 SERPL-SCNC: 27 MMOL/L (ref 20–32)
CREAT SERPL-MCNC: 5.19 MG/DL (ref 0.52–1.04)
CRP SERPL-MCNC: 43 MG/L (ref 0–8)
D DIMER PPP FEU-MCNC: 0.8 UG/ML FEU (ref 0–0.5)
EOSINOPHIL # BLD AUTO: 0.3 10E3/UL (ref 0–0.7)
EOSINOPHIL NFR BLD AUTO: 6 %
ERYTHROCYTE [DISTWIDTH] IN BLOOD BY AUTOMATED COUNT: 12 % (ref 10–15)
GFR SERPL CREATININE-BSD FRML MDRD: 8 ML/MIN/1.73M2
GLUCOSE BLD-MCNC: 105 MG/DL (ref 70–99)
GLUCOSE BLDC GLUCOMTR-MCNC: 150 MG/DL (ref 70–99)
GLUCOSE BLDC GLUCOMTR-MCNC: 190 MG/DL (ref 70–99)
GLUCOSE BLDC GLUCOMTR-MCNC: 258 MG/DL (ref 70–99)
GLUCOSE BLDC GLUCOMTR-MCNC: 73 MG/DL (ref 70–99)
GLUCOSE BLDC GLUCOMTR-MCNC: 82 MG/DL (ref 70–99)
HCT VFR BLD AUTO: 29.1 % (ref 35–47)
HGB BLD-MCNC: 9.3 G/DL (ref 11.7–15.7)
HOLD SPECIMEN: NORMAL
HOLD SPECIMEN: NORMAL
IMM GRANULOCYTES # BLD: 0 10E3/UL
IMM GRANULOCYTES NFR BLD: 0 %
LYMPHOCYTES # BLD AUTO: 1 10E3/UL (ref 0.8–5.3)
LYMPHOCYTES NFR BLD AUTO: 25 %
MCH RBC QN AUTO: 32.2 PG (ref 26.5–33)
MCHC RBC AUTO-ENTMCNC: 32 G/DL (ref 31.5–36.5)
MCV RBC AUTO: 101 FL (ref 78–100)
MONOCYTES # BLD AUTO: 0.6 10E3/UL (ref 0–1.3)
MONOCYTES NFR BLD AUTO: 15 %
NEUTROPHILS # BLD AUTO: 2.2 10E3/UL (ref 1.6–8.3)
NEUTROPHILS NFR BLD AUTO: 54 %
NRBC # BLD AUTO: 0 10E3/UL
NRBC BLD AUTO-RTO: 0 /100
PLATELET # BLD AUTO: 141 10E3/UL (ref 150–450)
POTASSIUM BLD-SCNC: 4.2 MMOL/L (ref 3.4–5.3)
PROT SERPL-MCNC: 6.4 G/DL (ref 6.8–8.8)
RBC # BLD AUTO: 2.89 10E6/UL (ref 3.8–5.2)
SODIUM SERPL-SCNC: 139 MMOL/L (ref 133–144)
WBC # BLD AUTO: 4.1 10E3/UL (ref 4–11)

## 2021-08-17 PROCEDURE — 96372 THER/PROPH/DIAG INJ SC/IM: CPT | Performed by: HOSPITALIST

## 2021-08-17 PROCEDURE — G0378 HOSPITAL OBSERVATION PER HR: HCPCS

## 2021-08-17 PROCEDURE — 85379 FIBRIN DEGRADATION QUANT: CPT | Performed by: HOSPITALIST

## 2021-08-17 PROCEDURE — 36415 COLL VENOUS BLD VENIPUNCTURE: CPT | Performed by: INTERNAL MEDICINE

## 2021-08-17 PROCEDURE — 87340 HEPATITIS B SURFACE AG IA: CPT | Performed by: INTERNAL MEDICINE

## 2021-08-17 PROCEDURE — 80053 COMPREHEN METABOLIC PANEL: CPT | Performed by: HOSPITALIST

## 2021-08-17 PROCEDURE — 250N000013 HC RX MED GY IP 250 OP 250 PS 637: Performed by: INTERNAL MEDICINE

## 2021-08-17 PROCEDURE — 86140 C-REACTIVE PROTEIN: CPT | Performed by: HOSPITALIST

## 2021-08-17 PROCEDURE — 250N000012 HC RX MED GY IP 250 OP 636 PS 637: Performed by: HOSPITALIST

## 2021-08-17 PROCEDURE — 86706 HEP B SURFACE ANTIBODY: CPT | Performed by: INTERNAL MEDICINE

## 2021-08-17 PROCEDURE — 85025 COMPLETE CBC W/AUTO DIFF WBC: CPT | Performed by: HOSPITALIST

## 2021-08-17 PROCEDURE — 250N000012 HC RX MED GY IP 250 OP 636 PS 637: Performed by: INTERNAL MEDICINE

## 2021-08-17 PROCEDURE — G0463 HOSPITAL OUTPT CLINIC VISIT: HCPCS

## 2021-08-17 PROCEDURE — 36415 COLL VENOUS BLD VENIPUNCTURE: CPT | Performed by: HOSPITALIST

## 2021-08-17 PROCEDURE — 250N000013 HC RX MED GY IP 250 OP 250 PS 637: Performed by: HOSPITALIST

## 2021-08-17 PROCEDURE — 120N000001 HC R&B MED SURG/OB

## 2021-08-17 PROCEDURE — 250N000011 HC RX IP 250 OP 636: Performed by: HOSPITALIST

## 2021-08-17 PROCEDURE — 99233 SBSQ HOSP IP/OBS HIGH 50: CPT | Performed by: INTERNAL MEDICINE

## 2021-08-17 RX ORDER — NICOTINE POLACRILEX 4 MG
15-30 LOZENGE BUCCAL
Status: DISCONTINUED | OUTPATIENT
Start: 2021-08-17 | End: 2021-08-24 | Stop reason: HOSPADM

## 2021-08-17 RX ORDER — CICLOPIROX OLAMINE 7.7 MG/G
CREAM TOPICAL 2 TIMES DAILY
Status: DISCONTINUED | OUTPATIENT
Start: 2021-08-17 | End: 2021-08-17

## 2021-08-17 RX ORDER — ACETAMINOPHEN 325 MG/1
650 TABLET ORAL EVERY 6 HOURS PRN
Status: DISCONTINUED | OUTPATIENT
Start: 2021-08-17 | End: 2021-08-24 | Stop reason: HOSPADM

## 2021-08-17 RX ORDER — DEXTROSE MONOHYDRATE 25 G/50ML
25-50 INJECTION, SOLUTION INTRAVENOUS
Status: DISCONTINUED | OUTPATIENT
Start: 2021-08-17 | End: 2021-08-24 | Stop reason: HOSPADM

## 2021-08-17 RX ORDER — ALBUTEROL SULFATE 90 UG/1
2 AEROSOL, METERED RESPIRATORY (INHALATION) EVERY 4 HOURS PRN
Status: DISCONTINUED | OUTPATIENT
Start: 2021-08-17 | End: 2021-08-24 | Stop reason: HOSPADM

## 2021-08-17 RX ORDER — CHOLECALCIFEROL (VITAMIN D3) 50 MCG
50 TABLET ORAL DAILY
Status: DISCONTINUED | OUTPATIENT
Start: 2021-08-17 | End: 2021-08-24 | Stop reason: HOSPADM

## 2021-08-17 RX ORDER — SEVELAMER CARBONATE 800 MG/1
1600 TABLET, FILM COATED ORAL
Status: DISCONTINUED | OUTPATIENT
Start: 2021-08-17 | End: 2021-08-24 | Stop reason: HOSPADM

## 2021-08-17 RX ORDER — FUROSEMIDE 40 MG
80 TABLET ORAL 2 TIMES DAILY
Status: DISCONTINUED | OUTPATIENT
Start: 2021-08-17 | End: 2021-08-24 | Stop reason: HOSPADM

## 2021-08-17 RX ORDER — LIDOCAINE 40 MG/G
CREAM TOPICAL
Status: DISCONTINUED | OUTPATIENT
Start: 2021-08-17 | End: 2021-08-24 | Stop reason: HOSPADM

## 2021-08-17 RX ORDER — HEPARIN SODIUM 5000 [USP'U]/.5ML
5000 INJECTION, SOLUTION INTRAVENOUS; SUBCUTANEOUS EVERY 8 HOURS SCHEDULED
Status: DISCONTINUED | OUTPATIENT
Start: 2021-08-17 | End: 2021-08-24 | Stop reason: HOSPADM

## 2021-08-17 RX ORDER — POLYETHYLENE GLYCOL 3350 17 G/17G
17 POWDER, FOR SOLUTION ORAL DAILY PRN
Status: DISCONTINUED | OUTPATIENT
Start: 2021-08-17 | End: 2021-08-24 | Stop reason: HOSPADM

## 2021-08-17 RX ORDER — SEVELAMER CARBONATE 800 MG/1
800 TABLET, FILM COATED ORAL
Status: DISCONTINUED | OUTPATIENT
Start: 2021-08-17 | End: 2021-08-24 | Stop reason: HOSPADM

## 2021-08-17 RX ORDER — BISACODYL 10 MG
10 SUPPOSITORY, RECTAL RECTAL DAILY PRN
Status: DISCONTINUED | OUTPATIENT
Start: 2021-08-17 | End: 2021-08-24 | Stop reason: HOSPADM

## 2021-08-17 RX ORDER — GABAPENTIN 100 MG/1
100 CAPSULE ORAL 3 TIMES DAILY PRN
Status: DISCONTINUED | OUTPATIENT
Start: 2021-08-17 | End: 2021-08-24 | Stop reason: HOSPADM

## 2021-08-17 RX ORDER — DESONIDE 0.5 MG/G
OINTMENT TOPICAL 2 TIMES DAILY
Status: DISCONTINUED | OUTPATIENT
Start: 2021-08-17 | End: 2021-08-17

## 2021-08-17 RX ORDER — CARVEDILOL 25 MG/1
25 TABLET ORAL 2 TIMES DAILY WITH MEALS
Status: DISCONTINUED | OUTPATIENT
Start: 2021-08-17 | End: 2021-08-24 | Stop reason: HOSPADM

## 2021-08-17 RX ORDER — AMLODIPINE BESYLATE 5 MG/1
5 TABLET ORAL 2 TIMES DAILY
Status: DISCONTINUED | OUTPATIENT
Start: 2021-08-17 | End: 2021-08-24 | Stop reason: HOSPADM

## 2021-08-17 RX ORDER — NYSTATIN 100000 U/G
OINTMENT TOPICAL 2 TIMES DAILY
Status: DISCONTINUED | OUTPATIENT
Start: 2021-08-17 | End: 2021-08-17

## 2021-08-17 RX ORDER — TRIAMCINOLONE ACETONIDE 0.25 MG/G
OINTMENT TOPICAL 2 TIMES DAILY
Status: DISCONTINUED | OUTPATIENT
Start: 2021-08-17 | End: 2021-08-17

## 2021-08-17 RX ORDER — GUAIFENESIN/DEXTROMETHORPHAN 100-10MG/5
5 SYRUP ORAL EVERY 4 HOURS PRN
Status: DISCONTINUED | OUTPATIENT
Start: 2021-08-17 | End: 2021-08-24 | Stop reason: HOSPADM

## 2021-08-17 RX ORDER — ACETAMINOPHEN 650 MG/1
650 SUPPOSITORY RECTAL EVERY 6 HOURS PRN
Status: DISCONTINUED | OUTPATIENT
Start: 2021-08-17 | End: 2021-08-24 | Stop reason: HOSPADM

## 2021-08-17 RX ORDER — ONDANSETRON 4 MG/1
4 TABLET, ORALLY DISINTEGRATING ORAL EVERY 6 HOURS PRN
Status: DISCONTINUED | OUTPATIENT
Start: 2021-08-17 | End: 2021-08-24 | Stop reason: HOSPADM

## 2021-08-17 RX ORDER — ONDANSETRON 2 MG/ML
4 INJECTION INTRAMUSCULAR; INTRAVENOUS EVERY 6 HOURS PRN
Status: DISCONTINUED | OUTPATIENT
Start: 2021-08-17 | End: 2021-08-24 | Stop reason: HOSPADM

## 2021-08-17 RX ADMIN — GUAIFENESIN AND DEXTROMETHORPHAN 5 ML: 100; 10 SYRUP ORAL at 21:55

## 2021-08-17 RX ADMIN — SEVELAMER CARBONATE 1600 MG: 800 TABLET, FILM COATED ORAL at 10:43

## 2021-08-17 RX ADMIN — INSULIN ASPART 1 UNITS: 100 INJECTION, SOLUTION INTRAVENOUS; SUBCUTANEOUS at 14:20

## 2021-08-17 RX ADMIN — INSULIN GLARGINE 18 UNITS: 100 INJECTION, SOLUTION SUBCUTANEOUS at 22:25

## 2021-08-17 RX ADMIN — HEPARIN SODIUM 5000 UNITS: 5000 INJECTION, SOLUTION INTRAVENOUS; SUBCUTANEOUS at 05:47

## 2021-08-17 RX ADMIN — HEPARIN SODIUM 5000 UNITS: 5000 INJECTION, SOLUTION INTRAVENOUS; SUBCUTANEOUS at 22:17

## 2021-08-17 RX ADMIN — SEVELAMER CARBONATE 1600 MG: 800 TABLET, FILM COATED ORAL at 14:16

## 2021-08-17 RX ADMIN — INSULIN ASPART 4 UNITS: 100 INJECTION, SOLUTION INTRAVENOUS; SUBCUTANEOUS at 14:17

## 2021-08-17 RX ADMIN — GABAPENTIN 100 MG: 100 CAPSULE ORAL at 10:44

## 2021-08-17 RX ADMIN — AMLODIPINE BESYLATE 5 MG: 5 TABLET ORAL at 10:44

## 2021-08-17 RX ADMIN — SEVELAMER CARBONATE 1600 MG: 800 TABLET, FILM COATED ORAL at 18:44

## 2021-08-17 RX ADMIN — MICONAZOLE NITRATE: 20 POWDER TOPICAL at 22:18

## 2021-08-17 RX ADMIN — CARVEDILOL 25 MG: 25 TABLET, FILM COATED ORAL at 18:44

## 2021-08-17 RX ADMIN — INSULIN ASPART 3 UNITS: 100 INJECTION, SOLUTION INTRAVENOUS; SUBCUTANEOUS at 18:44

## 2021-08-17 RX ADMIN — Medication 50 MCG: at 10:43

## 2021-08-17 RX ADMIN — FUROSEMIDE 80 MG: 40 TABLET ORAL at 22:16

## 2021-08-17 RX ADMIN — CARVEDILOL 25 MG: 25 TABLET, FILM COATED ORAL at 10:44

## 2021-08-17 RX ADMIN — AMLODIPINE BESYLATE 5 MG: 5 TABLET ORAL at 22:16

## 2021-08-17 RX ADMIN — DEXAMETHASONE 6 MG: 2 TABLET ORAL at 10:43

## 2021-08-17 RX ADMIN — ACETAMINOPHEN 650 MG: 325 TABLET, FILM COATED ORAL at 05:46

## 2021-08-17 RX ADMIN — SERTRALINE HYDROCHLORIDE 50 MG: 50 TABLET ORAL at 22:16

## 2021-08-17 RX ADMIN — HEPARIN SODIUM 5000 UNITS: 5000 INJECTION, SOLUTION INTRAVENOUS; SUBCUTANEOUS at 14:16

## 2021-08-17 RX ADMIN — MICONAZOLE NITRATE: 20 POWDER TOPICAL at 10:44

## 2021-08-17 ASSESSMENT — ACTIVITIES OF DAILY LIVING (ADL)
ADLS_ACUITY_SCORE: 14
ADLS_ACUITY_SCORE: 15
ADLS_ACUITY_SCORE: 14

## 2021-08-17 NOTE — PROGRESS NOTES
[facility]    Internal Medicine Hospitalist Progress Note  08/17/2021  I evaluated patient on the above date.    Librado Dumont Jr., MD  [phone] (p)  Text Page  Vocera        Assessment & Plan New actions/orders today (08/17/2021) are underlined.    Supriya Herr is a markedly pleasant 71 year old woman with past medical history that is most notable for obesity, psoriatic arthritis, DM2, HTN, CHF (HFpEF), PAD s/p left AKA, ESRD on HD, JONE and recently diagnosed COVID-19 (8/15), among others; who presented 8/16/2021 with cough, fevers and ongoing generalized weakness due to acute COVID-19 infection.    Acute COVID-19 pneumonia with acute hypoxic respiratory failure.  Generalized weakness due to above.  * Diagnosed with COVID 8/15. She reportedly has been fully vaccinated and her symptoms on admit were mild; in fact, she told Dr. Arriaga they had all resolved except for ongoing severe generalized weakness. On initially evaluation 8/16, she had temp 100, was hypertensive, was not hypoxic. She was too weak to stand unassisted in the ED. Her CXR suggested bilateral COVID pneumonia.  * On 8/17, O2 sats dropped to 80's and placed on 2L O2.  Recent Labs   Lab 08/17/21 0715   CRP 43.0*   DD 0.80*   - Given O2 requirement, start dexamethasone 6 mg daily for 10 days or until discharge.  - I d/w ID informally regarding possibly starting remdesivir with her known ESRD; did not feel she med criteria to start at this point, appreciate help.  - Continue O2, wean as able.  - Continue PRN albuterol.  - Continue PRN guaifenesin-dextromethorphan.   - Continue heparin prophylaxis.    Acute thrombocytopenia, suspect due to viral syndrome.  Platelets 144K on admit 8/16. She had some small ecchymoses over her right upper extremity on admit.  Recent Labs   Lab 08/17/21  0715 08/16/21  1816   * 144*   - Monitor CBC.  - Monitor for signs of bleeding.    Chronic anemia due to renal disease and iron  deficiency,  Hgb was 10.1 on admit 8/16 (was 10.9 on 4/2021).  Recent Labs   Lab 08/17/21  0715 08/16/21 1816   HGB 9.3* 10.1*   - Monitor CBC periodically.  - Continue PTA EPO and IV iron with HD per Nephrology.    Chronic Stage 1 sacral decubitus ulcer, present on admission.  Rash under breasts.  - Order miconazole powder.  - WOC consult pending.    ESRD on HD.  * She normally dialyzes T, Th, Sa. Since COVID diagnosis, has been dialyzing at a COVID dialysis unit on M, W, F schedule. Dialyzes through a LUE AV graft or fistula. She also has a right upper extremity AV fistula which has reportedly been complicated in the past by steal syndrome, requiring banding; recent outpatient RUE arterial US on 7/21/2021 done in follow up reportedly showed no clear evidence of stenosis.  - Continue HD per Nephrology.    Hypertension (benign essential).  Chronic diastolic CHF.  [PTA: amlodipine 5 mg BID; carvedilol 25 mg BID; furosemide 80 mg BID.]  * By history; most recent TTE in 2018 showed preserved LVEF. Lexiscan in 2018 reportedly was negative for inducible ischemia. She reports chronic dyspnea. Spirometry in 2018 was reportedly normal.  - Continue amlodipine; carvedilol; furosemide.  - Other volume management via HD.    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  [PTA: glargine 18U qpm; aspart 4U with breakfast, lunch and dinner.]  Most recent A1c 6.2 in 4/2021.   Recent Labs   Lab 08/17/21  0715 08/17/21  0550 08/16/21  1816   * 82 152*   - Restart glargine 18U qpm.  - Restart aspart 4U with breakfast, lunch and dinner.  - Continue medium ISS.  - Continue PRN gabapentin for neuropathy and chronic pain    Psoriasis and psoriatic arthritis.  - Continue PTA aprelimast.  - Continue PTA topical medications.    Dyslipidemia.  Chronic and stable.  - Continue atorvastatin.    PAD; h/o left lower extremity infection requiring AKA.  - Continue atorvastatin.    JONE.  Not currently on CPAP.   -  "Monitor sats closely while sleeping    Chronic depression and anxiety.  - Continue sertraline.      COVID-19 testing.  COVID-19 PCR Results    COVID-19 PCR Results 5/5/20 7/10/20 8/7/20 11/13/20 4/9/21 4/9/21 4/14/21 4/14/21 8/15/21        1455 1455 1117 1117    COVID-19 Virus PCR to [facility] - Result Not Detected Not Detected Not Detected Not Detected Test received-See reflex to IDDL test SARS CoV2 (COVID-19) Virus RT-PCR  Test received-See reflex to IDDL test SARS CoV2 (COVID-19) Virus RT-PCR     COVID-19 Virus PCR to [facility] - Source Nasopharyngeal Nasopharyngeal Nasopharyngeal Nasopharyngeal Nasopharyngeal  Nasopharyngeal     COVID-19 Virus by PCR (External Result)         Positive (A)   SARS-CoV-2 Virus Specimen Source      Nasopharyngeal  Nasopharyngeal    SARS-CoV-2 PCR Result      POSITIVE (A)  NEGATIVE    (A) Abnormal value       Comments are available for some flowsheets but are not being displayed.         COVID-19 Antibody Results, Testing for Immunity    COVID-19 Antibody Results, Testing for Immunity   No data to display.             Diet: Combination Diet Regular Diet Adult; Consistent Carb 60 Grams CHO per Meal Diet    Prophylaxis: PCD's, ambulation.   Bradshaw Catheter: Not present  Code Status: Full Code    Disposition Plan   Expected discharge: 1-2d recommended to prior living arrangement pending improvement in weakness.  Entered: Librado Dumont MD 08/17/2021, 8:50 AM     Given O2 requirements and likely need for 1-2 days more of hospitalization, change to inpatient status.    Interval History   Now needing O2.  Notes some cough.  Otherwise stable.  Tolerating diet.    -Data reviewed today: I reviewed all new labs and imaging over the last 24 hours. I personally reviewed no images or EKG's today.    Physical Exam    , Blood pressure 136/56, pulse 58, temperature 98.3  F (36.8  C), temperature source Oral, resp. rate 17, height 1.753 m (5' 9\"), weight 115.7 kg (255 lb), SpO2 90 %, not currently " "breastfeeding.  Vitals:    08/16/21 1749 08/16/21 1752   Weight: 115.7 kg (255 lb) 115.7 kg (255 lb)     Vital Signs with Ranges  Temp:  [97.8  F (36.6  C)-100  F (37.8  C)] 98.3  F (36.8  C)  Pulse:  [58-81] 58  Resp:  [16-18] 17  BP: (120-162)/(56-81) 136/56  SpO2:  [87 %-97 %] 90 %  Patient Vitals for the past 24 hrs:   BP Temp Temp src Pulse Resp SpO2 Height Weight   08/17/21 0753 136/56 98.3  F (36.8  C) Oral 58 17 90 % -- --   08/17/21 0700 120/64 -- -- 58 -- 95 % -- --   08/17/21 0600 138/73 -- -- 67 -- (!) 87 % -- --   08/17/21 0240 135/61 97.8  F (36.6  C) Oral -- -- 94 % -- --   08/16/21 2200 -- -- -- -- -- 97 % -- --   08/16/21 2000 -- -- -- -- -- 90 % -- --   08/16/21 1936 (!) 150/70 -- -- 79 -- 94 % -- --   08/16/21 1800 -- -- -- -- -- 93 % -- --   08/16/21 1752 (!) 162/81 100  F (37.8  C) Oral 77 16 94 % 1.753 m (5' 9\") 115.7 kg (255 lb)   08/16/21 1749 (!) 162/81 100  F (37.8  C) Oral 81 18 94 % -- 115.7 kg (255 lb)     I/O's Last 24 hours  No intake/output data recorded.    Constitutional: Awake, alert, pleasant.  Respiratory: Diminished in bases. No crackles or wheezes.  Cardiovascular: RRR, no m/r/g.  GI: Soft, nt, nd, +BS.  Skin/Integumen: No right lower extremity edema; left AKA present.  Other:        Data   Recent Labs   Lab 08/17/21  0715 08/17/21  0550 08/16/21  1816   WBC 4.1  --  4.8   HGB 9.3*  --  10.1*   *  --  100   *  --  144*     --  138   POTASSIUM 4.2  --  3.8   CHLORIDE 107  --  105   CO2 27  --  27   BUN 39*  --  28   CR 5.19*  --  3.93*   ANIONGAP 5  --  6   JODY 8.9  --  8.8   * 82 152*   ALBUMIN 2.5*  --   --    PROTTOTAL 6.4*  --   --    BILITOTAL 0.4  --   --    ALKPHOS 96  --   --    ALT 17  --   --    AST 14  --   --      Recent Labs   Lab Test 08/17/21  0715 08/17/21  0550 08/16/21  1816 04/17/21  1834 04/17/21  1227 04/17/21  0755 04/17/21  0200 04/16/21  2257 04/16/21  1921 04/16/21  0800   * 82 152*  --   --  142*  --   --   --  155* "   BGM  --   --   --  174* 197*  --  249* 291* 213*  --      Recent Labs   Lab 08/17/21  0715   CRP 43.0*   DD 0.80*         Recent Results (from the past 24 hour(s))   XR Chest Port 1 View    Narrative    XR PORTABLE CHEST ONE VIEW   8/16/2021 7:02 PM     HISTORY: Shortness of breath, COVID.    COMPARISON: Chest CT on 11/12/2019      Impression    IMPRESSION: Portable AP view of the chest was obtained. Mild  enlargement of the cardia silhouette. Atherosclerotic vascular  calcification of the aortic knob. Subtle basilar predominant pulmonary  opacities, could be related to patient's known history of COVID-19  infection. No significant pleural effusion or pneumothorax.    SCOTTIE BLACK MD         SYSTEM ID:  [id number]       Medications   All medications were reviewed.    - MEDICATION INSTRUCTIONS -         heparin ANTICOAGULANT  5,000 Units Subcutaneous Q8H VÍCTOR     insulin aspart  1-7 Units Subcutaneous TID AC     insulin aspart  1-5 Units Subcutaneous At Bedtime     sodium chloride (PF)  3 mL Intracatheter Q8H     acetaminophen **OR** acetaminophen, albuterol, bisacodyl, glucose **OR** dextrose **OR** glucagon, guaiFENesin-dextromethorphan, lidocaine 4%, lidocaine (buffered or not buffered), - MEDICATION INSTRUCTIONS -, melatonin, ondansetron **OR** ondansetron, polyethylene glycol, sodium chloride (PF)

## 2021-08-17 NOTE — PROGRESS NOTES
RECEIVING UNIT ED HANDOFF REVIEW    ED Nurse Handoff Report was reviewed by: ERYN DELGADO RN on August 17, 2021 at 7:42 AM

## 2021-08-17 NOTE — ED NOTES
"[facility]  ED Nurse Handoff Report    ED Chief complaint: Generalized Weakness, Shortness of Breath, Cough, and Covid Concern      ED Diagnosis:   Final diagnoses:   Infection due to 2019 novel coronavirus   Nausea       Code Status: Full Code    Allergies:   Allergies   Allergen Reactions     Penicillins Rash     Unasyn Rash     No evidence SJS, but very uncomfortable and precipitated multiple provider visits. Would not use penicillins again if other options available.        Patient Story: Increasing weakness since covid diagnosis. Daughter reports two falls on 8/16.   Focused Assessment:  Pt unable to transfer back to bed with assist of 2. Rene lift used to return pt to bed. Pt reports previous bed sores on buttocks. These are known. Please see clinic note and pictures. Not evaluated in ER.    Treatments and/or interventions provided: None  Patient's response to treatments and/or interventions: Pt tolerating bed well. Repositions herself every 2 hours.    To be done/followed up on inpatient unit:  Repositioning every 2 hours.    Does this patient have any cognitive concerns?: None    Activity level - Baseline/Home:  Wheelchair  Activity Level - Current:   Total Care    Patient's Preferred language: English   Needed?: No    Isolation: None and COVID r/o and special precautions  Infection: Not Applicable  COVID r/o and special precautions  Patient tested for COVID 19 prior to admission: YES  Bariatric?: No    Vital Signs:   Vitals:    08/16/21 1749 08/16/21 1752 08/16/21 1800 08/16/21 1936   BP: (!) 162/81 (!) 162/81  (!) 150/70   Pulse: 81 77  79   Resp: 18 16     Temp: 100  F (37.8  C) 100  F (37.8  C)     TempSrc: Oral Oral     SpO2: 94% 94% 93% 94%   Weight: 115.7 kg (255 lb) 115.7 kg (255 lb)     Height:  1.753 m (5' 9\")       Was the PSS-3 completed:   Yes  Family Comments: None  OBS brochure/video discussed/provided to patient/family: No    For the majority of the shift this " patient's behavior was Green.   Behavioral interventions performed were None.    ED NURSE PHONE NUMBER: [phone]

## 2021-08-17 NOTE — DISCHARGE INSTRUCTIONS
Go to my chart and follow instructions for antibodies.  Return for worsened symptoms or low oxygen levels.    If you feel your condition has changed or worsened, please call your doctor or return to the emergency department right away.      Discharge Instructions  COVID-19    COVID-19 is the disease caused by a new coronavirus. The virus spreads from person-to-person primarily by droplets when an infected person coughs or sneezes and the droplets are then breathed in by another person. There are tests available to diagnose COVID-19. You may have been diagnosed with COVID, may be being tested for COVID and have a pending test result, or may have been exposed to COVID.    Symptoms of COVID-19  Many people have no symptoms or mild symptoms.  Symptoms may usually appear 4 to 5 days (up to 14 days) after contact with a person with COVID-19. Some people will get severe symptoms and pneumonia. Usual symptoms are:     ? Fever  ? Cough  ? Trouble breathing    Less common symptoms are: Headache, body aches, sore throat, sneezing, diarrhea, loss of taste or smell.    Isolation and Quarantine    You may have been seen because you have symptoms, had an exposure, or had some other concern about possible COVID. The best way to stop the spread of the virus is to avoid contact with others.    Isolation refers to sick people staying away from people who are not sick. A person in quarantine is limiting activity because they were exposed and are waiting to see if they might become sick.    If you test positive for COVID, you should stay home (isolation) for at least 10 days after your symptoms began, and for 24 hours with no fever and improvement of symptoms--whichever is longer. (Your fever should be gone for 24 hours without using fever-reducing medicine). If you have no symptoms, you should stay home (isolation) for 10 days from the day of the test. If you have been vaccinated for COVID, the vaccination will not cause you to test  positive so a positive test result generally is a  true positive .    For example, if you have a fever and cough for 6 days, you need to stay home 4 more days with no fever for a total of 10 days. Or, if you have a fever and cough for 10 days, you need to stay home one more day with no fever for a total of 11 days.    If you have a high-risk exposure to COVID (you spent 15 minutes or more within six feet of somebody who has COVID), you should stay home (quarantine) for 14 days, unless you are vaccinated. Even if you test negative for COVID, the CDC recommends a 14-day quarantine from the time of your last exposure to that individual (unless you are vaccinated). There are options for a shortened (<14 day quarantine) you can review at:  https://[email or web address].[place]/diseases/coronavirus/close.html#long    If you live in the same house as somebody with COVID and cannot separate from them, you will need to quarantine for 14-days after that person's isolation (infectious) period. That means that you may need to quarantine for 24-days after that person became symptomatic/ill.    If you are vaccinated and do not develop symptoms, you do not need to quarantine after exposure.    If you have symptoms but a negative test, you should stay at home until you are symptom-free and without fever for 24 hours, using the same judgment you would for when it is safe to return to work/school from strep throat, influenza, or the common cold. If you worsen, you should consider being re-evaluated.    If you are being tested for COVID because of symptoms and your test is pending, you should stay home until you know your test result.    If I have COVID, how should I protect myself and others?    Do not go to work or school. Have a friend or relative do your shopping. Do not use public transportation (bus, train) or ridesharing (Lyft, Uber).    Separate yourself from other people in your home. As much as possible, you should stay in one room  and away from other people in your home. Also, use a separate bathroom, if possible. Avoid handling pets or other animals while sick.     Wear a facemask if you need to be around other people and cover your mouth and nose with a tissue when you cough or sneeze.     Avoid sharing personal household items. You should not share dishes, drinking glasses, forks/knives/spoons, towels, or bedding with other people in your home. After using these items, they should be washed with soap and water. Clean parts of your home that are touched often (doorknobs, faucets, countertops, etc.) daily.     Wash your hands often with soap and water for at least 20 seconds or use an alcohol-based hand  containing at least 60% alcohol.     Avoid touching your face.    Treat your symptoms. You can take Acetaminophen (Tylenol) to treat body aches and fever as needed for comfort. Ibuprofen (Advil or Motrin) can be used as well if you still have symptoms after taking Tylenol. Drink fluids. Rest.    Watch for worsening symptoms such as shortness of breath/difficulty breathing or very severe weakness.    Employers/workplaces are being asked by the Centers for Disease Control (CDC) to not request notes/documentation for you to return to work or prove that you were ill. You may choose to show your employer this paperwork. Also, repeat testing should not be required to return to work.    Exercise/Sports in rare cases, COVID could affect your heart in a way that makes exercise or participation in sports dangerous.    If you have a mild COVID illness (fever, cough, sore throat, and similar symptoms but no difficulty breathing or abnormalities of the lung): After your COVID symptoms have resolved, wait 14-days before returning to activity.  If you have more than a mild illness (meaning that you have problems with your breathing or lungs) or if you participate in competitive or strenuous activity or have a history of heart disease: Please see  your primary doctor/provider prior to return to activity/competition.    Antibody treatments are available for patients with mild to moderate COVID illness in order to prevent severe illness. In general, only patients with risk factors for severe illness are eligible for treatment. For more information, to see if you are eligible, and to find treatment, go to the [facility]:  [email or web address]/[email or web address]/[email or web address]     Return to the Emergency Department if:    If you are developing worsening breathing, shortness of breath, or feel worse you should seek medical attention.  If you are uncertain, contact your health care provider/clinic. If you need emergency medical attention, call 911 and tell them you have been ill.

## 2021-08-17 NOTE — CONSULTS
Full consult note to follow.    71 f c ESKD admitted c weakness and found to have an acute COVID infxn.    AF-VSS x o2 sats 95% c 2L o2 (87% ra)  Labs ok x hb 9.3    1.  ESKD.  HD tmrw per [facility] schedule.  Orders placed.  2.  COVID infxn c hypoxia, fevers.  Supp o2, dex.

## 2021-08-17 NOTE — PLAN OF CARE
Pt came from ED at 0800. VSS on 3L O2. A&OX4, anxious and panic at times especially when having a hard time catching her breaths. Calm down with reassurance that her O2 stat is okay and reminded to take a slow and deeper breath. Fistula access to BUE- CDI and PROSPER.  Dialysis tomorrow. Pre-existing pressure injury to coccyx and groin area- mepilex applied. Redness under bilateral breast- miconazole applied. BG monitoring- insulin coverage given. Up with A2/lift. Able to self repositioning in bed. Continue to monitor.
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
## 2021-08-17 NOTE — CONSULTS
[facility] Nurse Inpatient Wound Assessment   Reason for consultation: Evaluate and treat sacral, bilateral breast, paanus, and groin wounds     Assessment  L sacral wounds due to pressure vs friction from sliding transfers. POA- Dry thick scab vs eschar     Gluteal cleft, bilateral breast, groin, and panus wounds due to Intertrigo    Status: initial assessment    Treatment Plan  L sacral/gluteal cleft wounds: Daily and PRN.  1. Cleanse with gentle soap and water. Pat dry.  2. Lightly dust antifungal powder to gluteal cleft (fold between buttocks), wipe away excess. Seal into place by dabbing powder with Cavilon No Sting Barrier Film. Allow to fully dry.  3. Cover L sacral wound with Mepilex 4x4 with border. Time and Date.  Pressure Injury prevention (please order supplies if not in room)  1. Turn/reposition every 1-2 hrs  2.   Float heels off bed with use of Heel Lift boots (Prevalon #489520)  3.   If incontinent Cleanse with incontinent cleanser (Martir spray #[id number]) followed by skin barrier protectant (Critic Aid paste)  BID and after each incontinent episode  4.   Prevent sliding and shear by limiting HOB to 30 degrees or less unless contraindicated, use knee gatch first if not contraindicated  5.   Chair cushion pressure redistribution as needed (#031340): please limit sitting to an hour at a time  6.   Optimize nutrition   7.   Consider PULSATE low air loss mattress    L thigh protection: Every 3rd day.  1. Cleanse with gentle soap and water.  2. Cover with Mepilex 4x4 with border.    Bilateral Breasts, Bilateral groin, Panus: BID and PRN  1. Cleanse with Martir Cleanse and Protect. Pat Dry.  2. Lightly dust antifungal powder to areas and wipe away excess.    Orders Written  Recommended provider order: None, at this time  WO Nurse follow-up plan:weekly  Nursing to notify the Provider(s) and re-consult the WO Nurse if wound(s) deteriorates or new skin concern.    Patient History  According to provider note(s):  Supriya  TATIANA Herr is a markedly pleasant 71 year old woman with past medical history that is most notable for obesity, psoriatic arthritis, DM2, HTN, CHF (HFpEF), PAD s/p left AKA, ESRD on HD, JONE and recently diagnosed COVID-19 (8/15), among others; who presented 8/16/2021 with cough, fevers and ongoing generalized weakness due to acute COVID-19 infection.     Objective Data  Containment of urine/stool: Incontinent pad in bed    Active Diet Order  Orders Placed This Encounter      Combination Diet Regular Diet Adult; Consistent Carb 60 Grams CHO per Meal Diet      Output:   No intake/output data recorded.    Risk Assessment:   Sensory Perception: 4-->no impairment  Moisture: 3-->occasionally moist  Activity: 2-->chairfast  Mobility: 2-->very limited  Nutrition: 3-->adequate  Friction and Shear: 3-->no apparent problem  Ben Score: 17                          Labs:   Recent Labs   Lab 08/17/21  0715   ALBUMIN 2.5*   HGB 9.3*   WBC 4.1   CRP 43.0*       Physical Exam  Areas of skin assessed: focused sacrum, L thigh, bilateral breasts, panus, and bilateral groin    Wound Location:  L sacrum  Date of last photo 8/17/21      Wound History: Patient with chronic ulceration/friction to this area. Had similar injury in 2018 noted in her chart. Sits in wheelchair most of day and slides from bed to wheelchair.    Wound Base: 100 % thick adherent dried drainage vs eschar      Palpation of the wound bed: firm      Drainage: none     Description of drainage: none     Measurements (length x width x depth, in cm) 1.0 x 0.4  With adherent scab vs eschar covering base.     Tunneling N/A     Undermining N/A  Periwound skin: superficial erosion and scar tissue      Color: pale and pink      Temperature: normal   Odor: none  Pain: absent, none  During assessement of this wound patient was concerned about L thigh injury. On assessment no open skin noted to area of concern. Patient did have lightly pigmented skin to this area will cover with  mepilex for protection.    Gluteal Cleft  Photo above  Wound description: 100% linear pink intact, blanchable moist epidermis.  Measurements: approximately 15 cm x 0.5cm without depth.  Periwound skin is intact without discoloration. No drainage noted but white fungal rash present. Denies pain.    Bilateral breasts, bilateral groin, and panus  All skin fold areas with pink intact, dry rash that is symmetrical. Mild moisture noted to all areas. Rash becomes more diffuse at the edges with periwound skin intact without discoloration. No drainage or pain noted.         Interventions  Visual inspection and assessment completed   Wound Care Rationale Improve absorptive capacity, Provide protection  and Decrease bacterial load  Wound Care: done per plan of care  Supplies: gathered, placed at the bedside and discussed with patient  Current off-loading measures: Pillows under calves and Pillows  Current support surface: Standard  Atmos Air mattress  Education provided to: importance of repositioning, plan of care, Moisture management and Off-loading pressure  Discussed plan of care with Patient    Jennifer Wynn RN CWOCN

## 2021-08-17 NOTE — PHARMACY-ADMISSION MEDICATION HISTORY
Pharmacy Medication History  Admission medication history interview status for the 8/16/2021  admission is complete. See EPIC admission navigator for prior to admission medications     Location of Interview: Phone  Medication history sources: Patient's family/friend (Caroline Gray, daughter)    Significant changes made to the medication list:  Removed probiotic daily (was to be given with antibiotic, no longer applicable)  Removed tramadol 25 mg q6h prn (per daughter, given short course post surgery, last filled 4/2021, therapy completed)     In the past week, patient estimated taking medication this percent of the time: greater than 90%    Additional medication history information:   Bactrim recently prescribed DS BID 7/23/21 #10ds; daughter says this Rx was picked up but never started. Rx was intended to bridge gap before wound care appointment, but when appointment was moved up, antibiotic was no longer needed/started.   Per daughter, patient has received multiple topical therapies and acknowledges that patient may not be taking all of them reliably often. Daughter also thinks patient receives epogen and venofer at dialysis, but was unable to verify these doses/frequencies.   Dialysis schedule normally Tu/Th/Sat, but patient skipped 8/14 and dialyzed 8/16/21.     Medication reconciliation completed by provider prior to medication history? No    Time spent in this activity: 30 minutes     Prior to Admission medications    Medication Sig Last Dose Taking? Auth Provider   acetaminophen (TYLENOL) 325 MG tablet Take 2 tablets (650 mg) by mouth every 4 hours as needed for mild pain 8/16/2021 at AM Yes Samuel Tinajero MD   amLODIPine (NORVASC) 5 MG tablet Take 1 tablet (5 mg) by mouth 2 times daily 8/16/2021 at AM Yes Silva Guerrero NP   apremilast (OTEZLA) 30 MG tablet Take 1 tablet (30 mg) by mouth daily 8/16/2021 at AM Yes Lynnette Herrera MD   atorvastatin (LIPITOR) 20 MG tablet TAKE 1 TABLET BY MOUTH  EVERY DAY IN THE EVENING 8/15/2021 at PM Yes Noam Jackson MD   carvedilol (COREG) 25 MG tablet Take 1 tablet (25 mg) by mouth 2 times daily (with meals) 8/16/2021 at AM Yes Karen Lynn NP   ciclopirox (LOPROX) 0.77 % cream Apply topically 2 times daily 8/16/2021 at Unknown time Yes Eleazar Pack DPM   Epoetin Martínez (EPOGEN IJ) Inject 800 Units into the vein three times a week tues thurs sat at dialysis Past Week at Unknown time Yes Unknown, Entered By History   furosemide (LASIX) 80 MG tablet Take 1 tablet (80 mg) by mouth 2 times daily 8/16/2021 at AM Yes Karen Lynn NP   gabapentin (NEURONTIN) 100 MG capsule TAKE 1 CAPSULE (100 MG) BY MOUTH 3 TIMES DAILY AS NEEDED (PAIN) 8/16/2021 at Unknown time Yes Kathryn Basilio MD   insulin aspart (NOVOLOG FLEXPEN) 100 UNIT/ML pen INJECT 4 UNITS SUBCUTANEOUSLY WITH BREAKFAST, LUNCH AND DINNER. 8/16/2021 at AM Yes Arabella Kamara PA-C   insulin glargine (BASAGLAR KWIKPEN) 100 UNIT/ML pen Inject 18 units subcutaneous daily. 8/15/2021 at PM Yes Arabella Kamara PA-C   Iron Sucrose (VENOFER IV) Inject 50 mg into the vein twice a week At dialysis session (tues/Sat) Past Week at Unknown time Yes Unknown, Entered By History   miconazole (MICATIN) 2 % external powder Apply twice daily to skin folds as needed Past Week at Unknown time Yes Lynnette Herrera MD   ondansetron (ZOFRAN ODT) 4 MG ODT tab Take 1 tablet (4 mg) by mouth every 8 hours as needed  Yes Neeta Billy MD   sertraline (ZOLOFT) 50 MG tablet Take 1 tablet (50 mg) by mouth At Bedtime 8/15/2021 at PM Yes Noam Jackson MD   sevelamer carbonate (RENVELA) 800 MG tablet Take 800 mg by mouth Take with snacks or supplements Take 2 capsules with meals and 1 with snacks. 8/16/2021 at AM Yes Unknown, Entered By History   vitamin D3 (CHOLECALCIFEROL) 50 mcg (2000 units) tablet Take 1 tablet (50 mcg) by mouth daily 8/15/2021 at PM Yes Noam Jackson  "MD Luis   albuterol (PROAIR HFA/PROVENTIL HFA/VENTOLIN HFA) 108 (90 Base) MCG/ACT inhaler Inhale 2 puffs into the lungs every 6 hours as needed for shortness of breath / dyspnea or wheezing More than a month at Unknown time  Noam Jackson MD   blood glucose (ONE TOUCH DELICA) lancing device Device to be used with lancets.needs lancets device for delica lancets   Arabella Kamara PA-C   blood glucose (ONETOUCH ULTRA) test strip TEST YOUR BLOOD SUGAR 3-4 TIMES PER DAY.   Arabella Kamara PA-C   desonide (DESOWEN) 0.05 % external ointment Apply topically as needed Unknown at Unknown time  Reported, Patient   insulin pen needle (ULTICARE MINI) 31G X 6 MM Use daily or as directed.   Noam Jcakson MD   nystatin (MYCOSTATIN) 953826 UNIT/GM external ointment Twice daily to finger rash until healed. Unknown at Unknown time  Lynnette Herrera MD   order for DME Equipment being ordered: mattress overlay for hospital bed  Wt. 192#  Height 5'5\"  99 months/Lifetime   Noam Jackson MD   order for DME 1 wheelchair   Noam Jackson MD   triamcinolone (KENALOG) 0.025 % external ointment Apply topically 2 times daily To rash under breasts and groin as needed Unknown at Unknown time  Lynnette Herrera MD       The information provided in this note is only as accurate as the sources available at the time of update(s)   Sara Aranda, Rani    "

## 2021-08-18 LAB
ALBUMIN SERPL-MCNC: 2.7 G/DL (ref 3.4–5)
ANION GAP SERPL CALCULATED.3IONS-SCNC: 8 MMOL/L (ref 3–14)
BUN SERPL-MCNC: 59 MG/DL (ref 7–30)
CALCIUM SERPL-MCNC: 8.5 MG/DL (ref 8.5–10.1)
CHLORIDE BLD-SCNC: 110 MMOL/L (ref 94–109)
CO2 SERPL-SCNC: 22 MMOL/L (ref 20–32)
CREAT SERPL-MCNC: 6.73 MG/DL (ref 0.52–1.04)
CRP SERPL-MCNC: 51.1 MG/L (ref 0–8)
D DIMER PPP FEU-MCNC: 0.69 UG/ML FEU (ref 0–0.5)
ERYTHROCYTE [DISTWIDTH] IN BLOOD BY AUTOMATED COUNT: 12 % (ref 10–15)
GFR SERPL CREATININE-BSD FRML MDRD: 6 ML/MIN/1.73M2
GLUCOSE BLD-MCNC: 171 MG/DL (ref 70–99)
GLUCOSE BLDC GLUCOMTR-MCNC: 137 MG/DL (ref 70–99)
GLUCOSE BLDC GLUCOMTR-MCNC: 158 MG/DL (ref 70–99)
GLUCOSE BLDC GLUCOMTR-MCNC: 179 MG/DL (ref 70–99)
GLUCOSE BLDC GLUCOMTR-MCNC: 280 MG/DL (ref 70–99)
HBV SURFACE AB SERPL IA-ACNC: 0.45 M[IU]/ML
HBV SURFACE AG SERPL QL IA: NONREACTIVE
HCT VFR BLD AUTO: 34.4 % (ref 35–47)
HGB BLD-MCNC: 11 G/DL (ref 11.7–15.7)
LDH SERPL L TO P-CCNC: 150 U/L (ref 81–234)
MCH RBC QN AUTO: 31.9 PG (ref 26.5–33)
MCHC RBC AUTO-ENTMCNC: 32 G/DL (ref 31.5–36.5)
MCV RBC AUTO: 100 FL (ref 78–100)
PHOSPHATE SERPL-MCNC: 7 MG/DL (ref 2.5–4.5)
PLATELET # BLD AUTO: 136 10E3/UL (ref 150–450)
POTASSIUM BLD-SCNC: 5.2 MMOL/L (ref 3.4–5.3)
RBC # BLD AUTO: 3.45 10E6/UL (ref 3.8–5.2)
SODIUM SERPL-SCNC: 140 MMOL/L (ref 133–144)
WBC # BLD AUTO: 4.5 10E3/UL (ref 4–11)

## 2021-08-18 PROCEDURE — 250N000013 HC RX MED GY IP 250 OP 250 PS 637: Performed by: INTERNAL MEDICINE

## 2021-08-18 PROCEDURE — 99233 SBSQ HOSP IP/OBS HIGH 50: CPT | Performed by: HOSPITALIST

## 2021-08-18 PROCEDURE — 250N000012 HC RX MED GY IP 250 OP 636 PS 637: Performed by: INTERNAL MEDICINE

## 2021-08-18 PROCEDURE — 250N000013 HC RX MED GY IP 250 OP 250 PS 637: Performed by: HOSPITALIST

## 2021-08-18 PROCEDURE — 86140 C-REACTIVE PROTEIN: CPT | Performed by: INTERNAL MEDICINE

## 2021-08-18 PROCEDURE — 85027 COMPLETE CBC AUTOMATED: CPT | Performed by: INTERNAL MEDICINE

## 2021-08-18 PROCEDURE — 258N000003 HC RX IP 258 OP 636: Performed by: INTERNAL MEDICINE

## 2021-08-18 PROCEDURE — 634N000001 HC RX 634: Performed by: INTERNAL MEDICINE

## 2021-08-18 PROCEDURE — 80069 RENAL FUNCTION PANEL: CPT | Performed by: INTERNAL MEDICINE

## 2021-08-18 PROCEDURE — 90937 HEMODIALYSIS REPEATED EVAL: CPT

## 2021-08-18 PROCEDURE — 5A1D70Z PERFORMANCE OF URINARY FILTRATION, INTERMITTENT, LESS THAN 6 HOURS PER DAY: ICD-10-PCS | Performed by: INTERNAL MEDICINE

## 2021-08-18 PROCEDURE — 85379 FIBRIN DEGRADATION QUANT: CPT | Performed by: INTERNAL MEDICINE

## 2021-08-18 PROCEDURE — 36415 COLL VENOUS BLD VENIPUNCTURE: CPT | Performed by: INTERNAL MEDICINE

## 2021-08-18 PROCEDURE — 120N000001 HC R&B MED SURG/OB

## 2021-08-18 PROCEDURE — 99233 SBSQ HOSP IP/OBS HIGH 50: CPT | Performed by: INTERNAL MEDICINE

## 2021-08-18 PROCEDURE — 250N000011 HC RX IP 250 OP 636: Performed by: HOSPITALIST

## 2021-08-18 PROCEDURE — 83615 LACTATE (LD) (LDH) ENZYME: CPT | Performed by: INTERNAL MEDICINE

## 2021-08-18 RX ORDER — ALBUMIN, HUMAN INJ 5% 5 %
50-250 SOLUTION INTRAVENOUS
Status: DISCONTINUED | OUTPATIENT
Start: 2021-08-18 | End: 2021-08-18

## 2021-08-18 RX ORDER — ALBUMIN (HUMAN) 12.5 G/50ML
50 SOLUTION INTRAVENOUS
Status: DISCONTINUED | OUTPATIENT
Start: 2021-08-18 | End: 2021-08-18

## 2021-08-18 RX ADMIN — CARVEDILOL 25 MG: 25 TABLET, FILM COATED ORAL at 08:01

## 2021-08-18 RX ADMIN — MICONAZOLE NITRATE: 20 POWDER TOPICAL at 21:59

## 2021-08-18 RX ADMIN — SEVELAMER CARBONATE 1600 MG: 800 TABLET, FILM COATED ORAL at 12:02

## 2021-08-18 RX ADMIN — SEVELAMER CARBONATE 1600 MG: 800 TABLET, FILM COATED ORAL at 08:01

## 2021-08-18 RX ADMIN — INSULIN ASPART 4 UNITS: 100 INJECTION, SOLUTION INTRAVENOUS; SUBCUTANEOUS at 08:04

## 2021-08-18 RX ADMIN — HEPARIN SODIUM 5000 UNITS: 5000 INJECTION, SOLUTION INTRAVENOUS; SUBCUTANEOUS at 13:20

## 2021-08-18 RX ADMIN — SODIUM CHLORIDE 250 ML: 9 INJECTION, SOLUTION INTRAVENOUS at 14:35

## 2021-08-18 RX ADMIN — INSULIN GLARGINE 18 UNITS: 100 INJECTION, SOLUTION SUBCUTANEOUS at 21:59

## 2021-08-18 RX ADMIN — INSULIN ASPART 4 UNITS: 100 INJECTION, SOLUTION INTRAVENOUS; SUBCUTANEOUS at 12:04

## 2021-08-18 RX ADMIN — HEPARIN SODIUM 5000 UNITS: 5000 INJECTION, SOLUTION INTRAVENOUS; SUBCUTANEOUS at 06:27

## 2021-08-18 RX ADMIN — Medication 50 MCG: at 08:01

## 2021-08-18 RX ADMIN — SEVELAMER CARBONATE 1600 MG: 800 TABLET, FILM COATED ORAL at 18:22

## 2021-08-18 RX ADMIN — GUAIFENESIN AND DEXTROMETHORPHAN 5 ML: 100; 10 SYRUP ORAL at 08:01

## 2021-08-18 RX ADMIN — INSULIN ASPART 4 UNITS: 100 INJECTION, SOLUTION INTRAVENOUS; SUBCUTANEOUS at 18:22

## 2021-08-18 RX ADMIN — INSULIN ASPART 1 UNITS: 100 INJECTION, SOLUTION INTRAVENOUS; SUBCUTANEOUS at 12:03

## 2021-08-18 RX ADMIN — AMLODIPINE BESYLATE 5 MG: 5 TABLET ORAL at 21:59

## 2021-08-18 RX ADMIN — AMLODIPINE BESYLATE 5 MG: 5 TABLET ORAL at 08:01

## 2021-08-18 RX ADMIN — SODIUM CHLORIDE 300 ML: 9 INJECTION, SOLUTION INTRAVENOUS at 14:36

## 2021-08-18 RX ADMIN — HEPARIN SODIUM 5000 UNITS: 5000 INJECTION, SOLUTION INTRAVENOUS; SUBCUTANEOUS at 21:59

## 2021-08-18 RX ADMIN — SERTRALINE HYDROCHLORIDE 50 MG: 50 TABLET ORAL at 21:59

## 2021-08-18 RX ADMIN — MICONAZOLE NITRATE: 20 POWDER TOPICAL at 08:07

## 2021-08-18 RX ADMIN — DEXAMETHASONE 6 MG: 2 TABLET ORAL at 08:01

## 2021-08-18 RX ADMIN — CARVEDILOL 25 MG: 25 TABLET, FILM COATED ORAL at 18:22

## 2021-08-18 RX ADMIN — INSULIN ASPART 1 UNITS: 100 INJECTION, SOLUTION INTRAVENOUS; SUBCUTANEOUS at 08:02

## 2021-08-18 RX ADMIN — EPOETIN ALFA-EPBX 3000 UNITS: 3000 INJECTION, SOLUTION INTRAVENOUS; SUBCUTANEOUS at 14:37

## 2021-08-18 RX ADMIN — FUROSEMIDE 80 MG: 40 TABLET ORAL at 08:01

## 2021-08-18 ASSESSMENT — ACTIVITIES OF DAILY LIVING (ADL)
ADLS_ACUITY_SCORE: 14
ADLS_ACUITY_SCORE: 16
ADLS_ACUITY_SCORE: 14
ADLS_ACUITY_SCORE: 14
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
## 2021-08-18 NOTE — PLAN OF CARE
A&Ox3-4 (D/O to place at times); forgetful. Anxious @ times & needing reassurance. VSS on 3L O2. Denies pain; prn robitussin x1 for c/o cough. Assist x2 w/ lift device, repositions self. Tolerating mod CHO diet. Voiding in BSC; adequate UOP. L AKA, pre-existing pressure injury to coccyx and groin area- mepilex applied, redness under bilateral breast - miconazole applied.  R leg PIV SL, Fistula access to BUE- CDI and PROSPER. Pulsate matress & prevalon boots ordered.  & 190. WOC, nephrology following. Dialysis planned for tomorrow.

## 2021-08-18 NOTE — PLAN OF CARE
Current Problem: Impaired  gas exchange r/t Covid 19 infection  Management Provided: On O2 at 3 LPM per orders. Encouraged deep breathing exercise using IS. Encouraged increased fluid intake. Kept on semi-fowlers position. Educated re eating balance and nutritious meal. Educated importance of rest and sleep.  Patient response: Pt VS WNL. O2 sat WNL. No shortness of breath reported. Rested well during the night.

## 2021-08-18 NOTE — PROGRESS NOTES
[facility]    Medicine Progress Note - Hospitalist Service       Date of Admission:  8/16/2021    Assessment & Plan         Supriya Herr is a markedly pleasant 71 year old woman with past medical history that is most notable for obesity, psoriatic arthritis, DM2, HTN, CHF (HFpEF), PAD s/p left AKA, ESRD on HD, JONE and recently diagnosed COVID-19 (8/15), among others; who presented 8/16/2021 with cough, fevers and ongoing generalized weakness due to acute COVID-19 infection.     Acute COVID-19 pneumonia with acute hypoxic respiratory failure.  Generalized weakness due to above.  * Diagnosed with COVID 8/15. She reportedly has been fully vaccinated and her symptoms on admit were mild; in fact, she told Dr. Arriaga they had all resolved except for ongoing severe generalized weakness. On initially evaluation 8/16, she had temp 100, was hypertensive, was not hypoxic. She was too weak to stand unassisted in the ED. Her CXR suggested bilateral COVID pneumonia.  * On 8/17, O2 sats dropped to 80's and placed on 2L O2.  - Continue O2, wean as able.  -Continue dexamethasine  - Continue PRN albuterol.  - Continue PRN guaifenesin-dextromethorphan.   - Continue heparin prophylaxis     Acute thrombocytopenia, suspect due to viral syndrome.  Platelets 144K on admit 8/16. She had some small ecchymoses over her right upper extremity on admit.       Recent Labs   Lab 08/17/21  0715 08/16/21  1816   * 144*   - Monitor CBC.  - Monitor for signs of bleeding.     Chronic anemia due to renal disease and iron deficiency,  Hgb was 10.1 on admit 8/16 (was 10.9 on 4/2021).       Recent Labs   Lab 08/17/21  0715 08/16/21  1816   HGB 9.3* 10.1*   - Monitor CBC periodically.  - Continue PTA EPO and IV iron with HD per Nephrology.     Chronic Stage 1 sacral decubitus ulcer, present on admission.  Rash under breasts.  - continue miconazole powder.  - WOC consult pending.     ESRD on HD.  * She normally dialyzes  T, Th, Sa. Since COVID diagnosis, has been dialyzing at a [facility] dialysis unit on M, W, F schedule. Dialyzes through a LUE AV graft or fistula. She also has a right upper extremity AV fistula which has reportedly been complicated in the past by steal syndrome, requiring banding; recent outpatient RUE arterial US on 7/21/2021 done in follow up reportedly showed no clear evidence of stenosis.  - Continue HD per Nephrology.     Hypertension (benign essential).  Chronic diastolic CHF.  [PTA: amlodipine 5 mg BID; carvedilol 25 mg BID; furosemide 80 mg BID.]  * By history; most recent TTE in 2018 showed preserved LVEF. Lexiscan in 2018 reportedly was negative for inducible ischemia. She reports chronic dyspnea. Spirometry in 2018 was reportedly normal.  - Continue amlodipine; carvedilol; furosemide.  - Other volume management via HD.     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  [PTA: glargine 18U qpm; aspart 4U with breakfast, lunch and dinner.]  Most recent A1c 6.2 in 4/2021.         Recent Labs   - continue glargine 18U qpm.  - Continue aspart 4U with breakfast, lunch and dinner.  - Continue medium ISS.  - Continue PRN gabapentin for neuropathy and chronic pain     Psoriasis and psoriatic arthritis.  - Continue PTA aprelimast.  - Continue PTA topical medications.     Dyslipidemia.  Chronic and stable.  - Continue atorvastatin.     PAD; h/o left lower extremity infection requiring AKA.  - Continue atorvastatin.     JONE.  Not currently on CPAP.   - Monitor sats closely while sleeping     Chronic depression and anxiety.  - Continue sertraline.         Diet: Combination Diet Regular Diet Adult; Consistent Carb 60 Grams CHO per Meal Diet    DVT Prophylaxis: Heparin SQ  Bradshaw Catheter: Not present  Central Lines: PRESENT     Code Status: Full Code      Disposition Plan   Expected discharge: 08/19/2021 recommended to prior living arrangement once resp status is stable.     The patient's care was  discussed with the Patient.    Diallo Juarez MD  Hospitalist Service  [facility]  Securely message with the [employer] Web Console (learn more here)  Text page via [email or web address] Paging/Directory      Clinically Significant Risk Factors Present on Admission               ______________________________________________________________________    Interval History   Still feeling shortness of breath on occasion. Having hemodialysis this afternoon.    Data reviewed today: I reviewed all medications, new labs and imaging results over the last 24 hours. I personally reviewed no images or EKG's today.    Physical Exam   Vital Signs: Temp: 98.1  F (36.7  C) Temp src: Oral BP: (!) 170/60 Pulse: 64   Resp: 18 SpO2: 91 % O2 Device: Nasal cannula Oxygen Delivery: 3 LPM  Weight: 255 lbs 0 oz  Constitutional: awake, alert, cooperative, no apparent distress, and appears stated age  Respiratory: No increased work of breathing, good air exchange, clear to auscultation bilaterally, no crackles or wheezing  Cardiovascular: Normal apical impulse, regular rate and rhythm, normal S1 and S2, no S3 or S4, and no murmur noted  GI: No scars, normal bowel sounds, soft, non-distended, non-tender, no masses palpated, no hepatosplenomegally  Skin: no rashes  Neuropsychiatric: General: normal, calm and normal eye contact    Data   Recent Labs   Lab 08/18/21  1202 08/18/21  0727 08/18/21  0632 08/17/21  0715 08/16/21  1816   WBC  --  4.5  --  4.1 4.8   HGB  --  11.0*  --  9.3* 10.1*   MCV  --  100  --  101* 100   PLT  --  136*  --  141* 144*   NA  --  140  --  139 138   POTASSIUM  --  5.2  --  4.2 3.8   CHLORIDE  --  110*  --  107 105   CO2  --  22  --  27 27   BUN  --  59*  --  39* 28   CR  --  6.73*  --  5.19* 3.93*   ANIONGAP  --  8  --  5 6   JODY  --  8.5  --  8.9 8.8   * 171* 158* 105* 152*   ALBUMIN  --  2.7*  --  2.5*  --    PROTTOTAL  --   --   --  6.4*  --    BILITOTAL  --   --   --  0.4  --    ALKPHOS  --   --    --  96  --    ALT  --   --   --  17  --    AST  --   --   --  14  --      No results found for this or any previous visit (from the past 24 hour(s)).  Medications     - MEDICATION INSTRUCTIONS -       - MEDICATION INSTRUCTIONS -         amLODIPine  5 mg Oral BID     apremilast  30 mg Oral Daily     carvedilol  25 mg Oral BID w/meals     dexamethasone  6 mg Oral Daily     furosemide  80 mg Oral BID     heparin ANTICOAGULANT  5,000 Units Subcutaneous Q8H VÍCTOR     insulin aspart  1-7 Units Subcutaneous TID AC     insulin aspart  1-5 Units Subcutaneous At Bedtime     insulin aspart  4 Units Subcutaneous TID w/meals     insulin glargine  18 Units Subcutaneous At Bedtime     miconazole   Topical BID     - MEDICATION INSTRUCTIONS -   Does not apply Once     sertraline  50 mg Oral At Bedtime     sevelamer carbonate  1,600 mg Oral TID w/meals     sodium chloride (PF)  3 mL Intracatheter Q8H     vitamin D3  50 mcg Oral Daily

## 2021-08-18 NOTE — PROGRESS NOTES
Potassium   Date Value Ref Range Status   08/18/2021 5.2 3.4 - 5.3 mmol/L Final   04/17/2021 4.2 3.4 - 5.3 mmol/L Final     Hemoglobin   Date Value Ref Range Status   08/18/2021 11.0 (L) 11.7 - 15.7 g/dL Final   04/16/2021 10.9 (L) 11.7 - 15.7 g/dL Final     Creatinine   Date Value Ref Range Status   08/18/2021 6.73 (H) 0.52 - 1.04 mg/dL Final   04/17/2021 5.98 (H) 0.52 - 1.04 mg/dL Final     Urea Nitrogen   Date Value Ref Range Status   08/18/2021 59 (H) 7 - 30 mg/dL Final   04/17/2021 68 (H) 7 - 30 mg/dL Final     Sodium   Date Value Ref Range Status   08/18/2021 140 133 - 144 mmol/L Final   04/17/2021 137 133 - 144 mmol/L Final     INR   Date Value Ref Range Status   04/16/2021 1.14 0.86 - 1.14 Final     Results for BROOKE ARANA (MRN [id number]) as of 8/18/2021 14:34   Ref. Range 8/17/2021 16:11   Hep B Surface Agn Latest Ref Range: Nonreactive  Nonreactive   Hepatitis B Surface Antibody Latest Ref Range: <8.00 m[IU]/mL 0.45     DIALYSIS PROCEDURE NOTE  Hepatitis status of previous patient on machine log was checked and verified ok to use with this patients hepatitis status.  Patient dialyzed for 3.5 hrs. on a K2 bath with a net fluid removal of  2.5L.    A BFR of 400 ml/min was obtained via a left upper-arm AV graft using 15gauge needles.      The treatment plan was discussed with Dr. Doe during the treatment.    Total heparin received during the treatment: 0 units.   Needle cannulation sites held x 10 min.   Meds  given: 3000 units epogen IVP   Complications: none      Person educated: patient. Knowledge base substantial. Barriers to learning: none. Educated on procedure via verbal mode. patient/family verbalized understanding. Pt prefers verbal education style.     ICEBOAT? Timeout performed pre-treatment  I: Patient was identified using 2 identifiers  C:  Consent Signed Yes  E: Equipment preventative maintenance is current and dialysis delivery system OK to use  B:see above  O: Dialysis orders present  and complete prior to treatment  A: Vascular access verified and assessed prior to treatment  T: Treatment was performed at a clinically appropriate time  ?: Patient was allowed to ask questions and address concerns prior to treatment  See flowsheet in EPIC for further details and post assessment.  Machine water alarm in place and functioning. Transducer pods intact and checked every 15min.   Pt dialyzed at the bedside in room 310 with covid-19 precautions.  Chlorine/Chloramine water system checked every 4 hours.  Outpatient Dialysis at [facility]. (on a MWF schedule while at a designated covid unit)

## 2021-08-18 NOTE — PLAN OF CARE
Alert and oriented x4. VSS . Denies pain. Up with lift. Dialysis done today. Blood glucose 158, 179, 137. Dressing changed today. Plan to continue to monitor.

## 2021-08-19 ENCOUNTER — APPOINTMENT (OUTPATIENT)
Dept: GENERAL RADIOLOGY | Facility: CLINIC | Age: 71
DRG: 177 | End: 2021-08-19
Attending: HOSPITALIST
Payer: MEDICARE

## 2021-08-19 LAB
BASOPHILS # BLD AUTO: 0 10E3/UL (ref 0–0.2)
BASOPHILS NFR BLD AUTO: 0 %
CRP SERPL-MCNC: 34.4 MG/L (ref 0–8)
D DIMER PPP FEU-MCNC: 1.03 UG/ML FEU (ref 0–0.5)
EOSINOPHIL # BLD AUTO: 0 10E3/UL (ref 0–0.7)
EOSINOPHIL NFR BLD AUTO: 0 %
ERYTHROCYTE [DISTWIDTH] IN BLOOD BY AUTOMATED COUNT: 11.9 % (ref 10–15)
GLUCOSE BLDC GLUCOMTR-MCNC: 108 MG/DL (ref 70–99)
GLUCOSE BLDC GLUCOMTR-MCNC: 130 MG/DL (ref 70–99)
GLUCOSE BLDC GLUCOMTR-MCNC: 146 MG/DL (ref 70–99)
GLUCOSE BLDC GLUCOMTR-MCNC: 176 MG/DL (ref 70–99)
GLUCOSE BLDC GLUCOMTR-MCNC: 231 MG/DL (ref 70–99)
HCT VFR BLD AUTO: 33.4 % (ref 35–47)
HGB BLD-MCNC: 10.9 G/DL (ref 11.7–15.7)
IMM GRANULOCYTES # BLD: 0 10E3/UL
IMM GRANULOCYTES NFR BLD: 0 %
LYMPHOCYTES # BLD AUTO: 0.7 10E3/UL (ref 0.8–5.3)
LYMPHOCYTES NFR BLD AUTO: 13 %
MCH RBC QN AUTO: 32.1 PG (ref 26.5–33)
MCHC RBC AUTO-ENTMCNC: 32.6 G/DL (ref 31.5–36.5)
MCV RBC AUTO: 98 FL (ref 78–100)
MONOCYTES # BLD AUTO: 0.6 10E3/UL (ref 0–1.3)
MONOCYTES NFR BLD AUTO: 11 %
NEUTROPHILS # BLD AUTO: 4 10E3/UL (ref 1.6–8.3)
NEUTROPHILS NFR BLD AUTO: 76 %
NRBC # BLD AUTO: 0 10E3/UL
NRBC BLD AUTO-RTO: 0 /100
PLATELET # BLD AUTO: 168 10E3/UL (ref 150–450)
PROCALCITONIN SERPL-MCNC: 4.17 NG/ML
RBC # BLD AUTO: 3.4 10E6/UL (ref 3.8–5.2)
WBC # BLD AUTO: 5.3 10E3/UL (ref 4–11)

## 2021-08-19 PROCEDURE — 85379 FIBRIN DEGRADATION QUANT: CPT | Performed by: HOSPITALIST

## 2021-08-19 PROCEDURE — 250N000013 HC RX MED GY IP 250 OP 250 PS 637: Performed by: INTERNAL MEDICINE

## 2021-08-19 PROCEDURE — 84145 PROCALCITONIN (PCT): CPT | Performed by: HOSPITALIST

## 2021-08-19 PROCEDURE — 250N000013 HC RX MED GY IP 250 OP 250 PS 637: Performed by: HOSPITALIST

## 2021-08-19 PROCEDURE — 71045 X-RAY EXAM CHEST 1 VIEW: CPT

## 2021-08-19 PROCEDURE — 36415 COLL VENOUS BLD VENIPUNCTURE: CPT | Performed by: HOSPITALIST

## 2021-08-19 PROCEDURE — 250N000012 HC RX MED GY IP 250 OP 636 PS 637: Performed by: INTERNAL MEDICINE

## 2021-08-19 PROCEDURE — 120N000001 HC R&B MED SURG/OB

## 2021-08-19 PROCEDURE — 250N000011 HC RX IP 250 OP 636: Performed by: HOSPITALIST

## 2021-08-19 PROCEDURE — 86140 C-REACTIVE PROTEIN: CPT | Performed by: HOSPITALIST

## 2021-08-19 PROCEDURE — 99233 SBSQ HOSP IP/OBS HIGH 50: CPT | Performed by: HOSPITALIST

## 2021-08-19 PROCEDURE — 85025 COMPLETE CBC W/AUTO DIFF WBC: CPT | Performed by: HOSPITALIST

## 2021-08-19 RX ORDER — LEVOFLOXACIN 5 MG/ML
750 INJECTION, SOLUTION INTRAVENOUS ONCE
Status: COMPLETED | OUTPATIENT
Start: 2021-08-19 | End: 2021-08-19

## 2021-08-19 RX ORDER — LEVOFLOXACIN 5 MG/ML
500 INJECTION, SOLUTION INTRAVENOUS
Status: DISCONTINUED | OUTPATIENT
Start: 2021-08-21 | End: 2021-08-24 | Stop reason: HOSPADM

## 2021-08-19 RX ADMIN — AMLODIPINE BESYLATE 5 MG: 5 TABLET ORAL at 21:20

## 2021-08-19 RX ADMIN — SERTRALINE HYDROCHLORIDE 50 MG: 50 TABLET ORAL at 21:20

## 2021-08-19 RX ADMIN — INSULIN ASPART 4 UNITS: 100 INJECTION, SOLUTION INTRAVENOUS; SUBCUTANEOUS at 18:09

## 2021-08-19 RX ADMIN — AMLODIPINE BESYLATE 5 MG: 5 TABLET ORAL at 08:51

## 2021-08-19 RX ADMIN — HEPARIN SODIUM 5000 UNITS: 5000 INJECTION, SOLUTION INTRAVENOUS; SUBCUTANEOUS at 06:20

## 2021-08-19 RX ADMIN — SEVELAMER CARBONATE 1600 MG: 800 TABLET, FILM COATED ORAL at 13:29

## 2021-08-19 RX ADMIN — MICONAZOLE NITRATE: 20 POWDER TOPICAL at 08:52

## 2021-08-19 RX ADMIN — HEPARIN SODIUM 5000 UNITS: 5000 INJECTION, SOLUTION INTRAVENOUS; SUBCUTANEOUS at 21:20

## 2021-08-19 RX ADMIN — INSULIN ASPART 1 UNITS: 100 INJECTION, SOLUTION INTRAVENOUS; SUBCUTANEOUS at 16:54

## 2021-08-19 RX ADMIN — GUAIFENESIN AND DEXTROMETHORPHAN 5 ML: 100; 10 SYRUP ORAL at 08:48

## 2021-08-19 RX ADMIN — Medication 50 MCG: at 08:51

## 2021-08-19 RX ADMIN — INSULIN ASPART 4 UNITS: 100 INJECTION, SOLUTION INTRAVENOUS; SUBCUTANEOUS at 08:55

## 2021-08-19 RX ADMIN — SEVELAMER CARBONATE 1600 MG: 800 TABLET, FILM COATED ORAL at 08:50

## 2021-08-19 RX ADMIN — SEVELAMER CARBONATE 1600 MG: 800 TABLET, FILM COATED ORAL at 18:08

## 2021-08-19 RX ADMIN — CARVEDILOL 25 MG: 25 TABLET, FILM COATED ORAL at 18:08

## 2021-08-19 RX ADMIN — LEVOFLOXACIN 750 MG: 5 INJECTION, SOLUTION INTRAVENOUS at 14:43

## 2021-08-19 RX ADMIN — INSULIN GLARGINE 18 UNITS: 100 INJECTION, SOLUTION SUBCUTANEOUS at 21:45

## 2021-08-19 RX ADMIN — HEPARIN SODIUM 5000 UNITS: 5000 INJECTION, SOLUTION INTRAVENOUS; SUBCUTANEOUS at 13:30

## 2021-08-19 RX ADMIN — DEXAMETHASONE 6 MG: 2 TABLET ORAL at 08:50

## 2021-08-19 RX ADMIN — CARVEDILOL 25 MG: 25 TABLET, FILM COATED ORAL at 08:50

## 2021-08-19 RX ADMIN — INSULIN ASPART 4 UNITS: 100 INJECTION, SOLUTION INTRAVENOUS; SUBCUTANEOUS at 13:29

## 2021-08-19 ASSESSMENT — ACTIVITIES OF DAILY LIVING (ADL)
ADLS_ACUITY_SCORE: 14

## 2021-08-19 NOTE — PROGRESS NOTES
[facility]    Medicine Progress Note - Hospitalist Service       Date of Admission:  8/16/2021    Assessment & Plan         Supriya Herr is a markedly pleasant 71 year old woman with past medical history that is most notable for obesity, psoriatic arthritis, DM2, HTN, CHF (HFpEF), PAD s/p left AKA, ESRD on HD, JONE and recently diagnosed COVID-19 (8/15), among others; who presented 8/16/2021 with cough, fevers and ongoing generalized weakness due to acute COVID-19 infection.     Acute COVID-19 pneumonia with acute hypoxic respiratory failure  Possible bacterial community acquired pneumonia  Generalized weakness due to above  * Diagnosed with COVID 8/15. She reportedly has been fully vaccinated and her symptoms on admit were mild; in fact, she told Dr. Arriaga they had all resolved except for ongoing severe generalized weakness. On initially evaluation 8/16, she had temp 100, was hypertensive, was not hypoxic. She was too weak to stand unassisted in the ED. Her CXR suggested bilateral COVID pneumonia.  * On 8/17, O2 sats dropped to 80's and placed on 2L O2.  She is still on 2-3L nasal canula- pro-calcitonin was 4 today ? Bacterial pneumonia- she has a productive cough  - Continue O2, wean as able.  -Continue dexamethasine  - Continue PRN albuterol.  - Continue PRN guaifenesin-dextromethorphan.   - Continue heparin prophylaxis  -add levofloxacin (PCN allergy)     Acute thrombocytopenia(mild), suspect due to viral syndrome  Platelet Count   Date Value Ref Range Status   08/19/2021 168 150 - 450 10e3/uL Final   04/16/2021 194 150 - 450 10e9/L Final   Resolved     Chronic anemia due to renal disease and iron deficiency  Hemoglobin   Date Value Ref Range Status   08/19/2021 10.9 (L) 11.7 - 15.7 g/dL Final   08/18/2021 11.0 (L) 11.7 - 15.7 g/dL Final   04/16/2021 10.9 (L) 11.7 - 15.7 g/dL Final   04/09/2021 11.3 (L) 11.7 - 15.7 g/dL Final   Hgb was 10.1 on admit 8/16 (was 10.9 on 4/2021).  -  Monitor CBC periodically.  - Continue PTA EPO and IV iron with HD per Nephrology.     Chronic Stage 1 sacral decubitus ulcer, present on admission.  Rash under breasts.  - Continue miconazole powder.  - WOC consult     ESRD on HD  * She normally dialyzes T, Th, Sa. Since COVID diagnosis, has been dialyzing at a COVID dialysis unit on M, W, F schedule. Dialyzes through a LUE AV graft or fistula. She also has a right upper extremity AV fistula which has reportedly been complicated in the past by steal syndrome, requiring banding; recent outpatient RUE arterial US on 7/21/2021 done in follow up reportedly showed no clear evidence of stenosis.  - Continue HD per Nephrology.     Hypertension (benign essential).  Chronic diastolic CHF  [PTA: amlodipine 5 mg BID; carvedilol 25 mg BID; furosemide 80 mg BID.]  * By history; most recent TTE in 2018 showed preserved LVEF. Lexiscan in 2018 reportedly was negative for inducible ischemia. She reports chronic dyspnea. Spirometry in 2018 was reportedly normal.  - Continue amlodipine; carvedilol; furosemide.  - Other volume management via HD.     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  [PTA: glargine 18U qpm; aspart 4U with breakfast, lunch and dinner.]  Most recent A1c 6.2 in 4/2021.   Recent Labs   Lab 08/19/21  1139 08/19/21  0748 08/19/21  0618 08/18/21  2122 08/18/21  1658 08/18/21  1202   * 108* 146* 280* 137* 179*     - Continue glargine 18U qpm.  - Continue aspart 4U with breakfast, lunch and dinner.  - Continue medium ISS.  - Continue PRN gabapentin for neuropathy and chronic pain     Psoriasis and psoriatic arthritis.  - Continue PTA aprelimast.  - Continue PTA topical medications.     Dyslipidemia.  Chronic and stable.  - Continue atorvastatin.     PAD; h/o left lower extremity infection requiring AKA.  - Continue atorvastatin.     JONE.  Not currently on CPAP.   - monitor oxygen saturation      Chronic depression and anxiety.  -  Continue sertraline     Diet: Combination Diet Regular Diet Adult; Consistent Carb 60 Grams CHO per Meal Diet    DVT Prophylaxis: Heparin SQ  Bradshaw Catheter: Not present  Central Lines: PRESENT       Code Status: Full Code      Disposition Plan   Expected discharge: 08/20/2021 recommended to prior living arrangement once resp status is stable.     The patient's care was discussed with the Patient/nurse    Diallo Juarez MD  Hospitalist Service  [facility]  Securely message with the Vocera Web Console (learn more here)  Text page via [employer] Paging/Directory      Clinically Significant Risk Factors Present on Admission               ______________________________________________________________________    Interval History   Stable overnight.  Having a productive cough with dark sputum. No pleurisy but feels a little short of breath.    Data reviewed today: I reviewed all medications, new labs and imaging results over the last 24 hours. I personally reviewed no images or EKG's today.    Physical Exam   Vital Signs: Temp: 98.2  F (36.8  C) Temp src: Oral BP: (!) 142/59 Pulse: 63   Resp: 20 SpO2: 96 % O2 Device: Nasal cannula Oxygen Delivery: 3 LPM  Weight: 255 lbs 0 oz  Constitutional: awake, alert, cooperative, no apparent distress, and appears stated age  Respiratory: No increased work of breathing, good air exchange, clear to auscultation bilaterally, no crackles or wheezing  Cardiovascular: regular rate and rhythm, normal S1 and S2, no S3 or S4, and no murmur noted  GI:  normal bowel sounds, soft, non-distended, non-tender  Skin: no rashes  Neuropsychiatric: General: normal, calm and normal eye contact    Data   Recent Labs   Lab 08/19/21  1139 08/19/21  0748 08/19/21  0742 08/19/21  0618 08/18/21  0727 08/17/21  0715 08/16/21  1816   WBC  --   --  5.3  --  4.5 4.1 4.8   HGB  --   --  10.9*  --  11.0* 9.3* 10.1*   MCV  --   --  98  --  100 101* 100   PLT  --   --  168  --  136* 141*  144*   NA  --   --   --   --  140 139 138   POTASSIUM  --   --   --   --  5.2 4.2 3.8   CHLORIDE  --   --   --   --  110* 107 105   CO2  --   --   --   --  22 27 27   BUN  --   --   --   --  59* 39* 28   CR  --   --   --   --  6.73* 5.19* 3.93*   ANIONGAP  --   --   --   --  8 5 6   JODY  --   --   --   --  8.5 8.9 8.8   * 108*  --  146* 171* 105* 152*   ALBUMIN  --   --   --   --  2.7* 2.5*  --    PROTTOTAL  --   --   --   --   --  6.4*  --    BILITOTAL  --   --   --   --   --  0.4  --    ALKPHOS  --   --   --   --   --  96  --    ALT  --   --   --   --   --  17  --    AST  --   --   --   --   --  14  --      No results found for this or any previous visit (from the past 24 hour(s)).  Medications     - MEDICATION INSTRUCTIONS -         - MEDICATION INSTRUCTIONS for Dialysis Patients -   Does not apply See Admin Instructions     amLODIPine  5 mg Oral BID     apremilast  30 mg Oral Daily     carvedilol  25 mg Oral BID w/meals     dexamethasone  6 mg Oral Daily     furosemide  80 mg Oral BID     heparin ANTICOAGULANT  5,000 Units Subcutaneous Q8H VÍCTOR     insulin aspart  1-7 Units Subcutaneous TID AC     insulin aspart  1-5 Units Subcutaneous At Bedtime     insulin aspart  4 Units Subcutaneous TID w/meals     insulin glargine  18 Units Subcutaneous At Bedtime     [START ON 8/21/2021] levofloxacin  500 mg Intravenous Q48H     levofloxacin  750 mg Intravenous Once     miconazole   Topical BID     sertraline  50 mg Oral At Bedtime     sevelamer carbonate  1,600 mg Oral TID w/meals     sodium chloride (PF)  3 mL Intracatheter Q8H     vitamin D3  50 mcg Oral Daily

## 2021-08-19 NOTE — PLAN OF CARE
VSS on 2-3 L of oxygen via nasal cannula. Pt c/o a little shortness of breath. A/O x4. Lung sounds diminished. IV saline locked. Up with assist of 2 and lift. Pt denies pain. Consistent CHO diet. Plan for dialysis tomorrow.

## 2021-08-19 NOTE — PLAN OF CARE
Current Problem: Impaired  gas exchange r/t Covid 19 infection  Management Provided: On O2 at 2 LPM. Deep breathing exercise performed using IS. Promoted increased fluid intake. On semi-fowlers position. Promoted rest and sleep. Acknowledge feeling of being on isolation.  Patient response: Pt VS WNL. O2 sat WNL. No shortness of breath reported. Rested well during the night. Feeling relief and calm

## 2021-08-20 LAB
ALBUMIN SERPL-MCNC: 2.6 G/DL (ref 3.4–5)
ANION GAP SERPL CALCULATED.3IONS-SCNC: 7 MMOL/L (ref 3–14)
BUN SERPL-MCNC: 58 MG/DL (ref 7–30)
CALCIUM SERPL-MCNC: 9.1 MG/DL (ref 8.5–10.1)
CHLORIDE BLD-SCNC: 105 MMOL/L (ref 94–109)
CO2 SERPL-SCNC: 27 MMOL/L (ref 20–32)
CREAT SERPL-MCNC: 5.6 MG/DL (ref 0.52–1.04)
CRP SERPL-MCNC: 23.7 MG/L (ref 0–8)
ERYTHROCYTE [DISTWIDTH] IN BLOOD BY AUTOMATED COUNT: 11.9 % (ref 10–15)
GFR SERPL CREATININE-BSD FRML MDRD: 7 ML/MIN/1.73M2
GLUCOSE BLD-MCNC: 150 MG/DL (ref 70–99)
GLUCOSE BLDC GLUCOMTR-MCNC: 119 MG/DL (ref 70–99)
GLUCOSE BLDC GLUCOMTR-MCNC: 143 MG/DL (ref 70–99)
GLUCOSE BLDC GLUCOMTR-MCNC: 149 MG/DL (ref 70–99)
GLUCOSE BLDC GLUCOMTR-MCNC: 301 MG/DL (ref 70–99)
HCT VFR BLD AUTO: 33.7 % (ref 35–47)
HGB BLD-MCNC: 10.8 G/DL (ref 11.7–15.7)
MCH RBC QN AUTO: 31.8 PG (ref 26.5–33)
MCHC RBC AUTO-ENTMCNC: 32 G/DL (ref 31.5–36.5)
MCV RBC AUTO: 99 FL (ref 78–100)
PHOSPHATE SERPL-MCNC: 4.6 MG/DL (ref 2.5–4.5)
PLATELET # BLD AUTO: 176 10E3/UL (ref 150–450)
POTASSIUM BLD-SCNC: 4.6 MMOL/L (ref 3.4–5.3)
PROCALCITONIN SERPL-MCNC: 2.32 NG/ML
RBC # BLD AUTO: 3.4 10E6/UL (ref 3.8–5.2)
SODIUM SERPL-SCNC: 139 MMOL/L (ref 133–144)
WBC # BLD AUTO: 4.3 10E3/UL (ref 4–11)

## 2021-08-20 PROCEDURE — 90937 HEMODIALYSIS REPEATED EVAL: CPT

## 2021-08-20 PROCEDURE — 250N000013 HC RX MED GY IP 250 OP 250 PS 637: Performed by: HOSPITALIST

## 2021-08-20 PROCEDURE — 36415 COLL VENOUS BLD VENIPUNCTURE: CPT | Performed by: HOSPITALIST

## 2021-08-20 PROCEDURE — 90935 HEMODIALYSIS ONE EVALUATION: CPT | Performed by: INTERNAL MEDICINE

## 2021-08-20 PROCEDURE — 120N000001 HC R&B MED SURG/OB

## 2021-08-20 PROCEDURE — 84145 PROCALCITONIN (PCT): CPT | Performed by: HOSPITALIST

## 2021-08-20 PROCEDURE — 250N000013 HC RX MED GY IP 250 OP 250 PS 637: Performed by: INTERNAL MEDICINE

## 2021-08-20 PROCEDURE — 250N000011 HC RX IP 250 OP 636: Performed by: HOSPITALIST

## 2021-08-20 PROCEDURE — 250N000012 HC RX MED GY IP 250 OP 636 PS 637: Performed by: INTERNAL MEDICINE

## 2021-08-20 PROCEDURE — 85027 COMPLETE CBC AUTOMATED: CPT | Performed by: INTERNAL MEDICINE

## 2021-08-20 PROCEDURE — 99232 SBSQ HOSP IP/OBS MODERATE 35: CPT | Performed by: HOSPITALIST

## 2021-08-20 PROCEDURE — 80069 RENAL FUNCTION PANEL: CPT | Performed by: INTERNAL MEDICINE

## 2021-08-20 PROCEDURE — 86140 C-REACTIVE PROTEIN: CPT | Performed by: HOSPITALIST

## 2021-08-20 RX ORDER — ALBUMIN, HUMAN INJ 5% 5 %
50-250 SOLUTION INTRAVENOUS
Status: DISCONTINUED | OUTPATIENT
Start: 2021-08-20 | End: 2021-08-20

## 2021-08-20 RX ORDER — ALBUMIN (HUMAN) 12.5 G/50ML
50 SOLUTION INTRAVENOUS
Status: DISCONTINUED | OUTPATIENT
Start: 2021-08-20 | End: 2021-08-20

## 2021-08-20 RX ADMIN — INSULIN ASPART 1 UNITS: 100 INJECTION, SOLUTION INTRAVENOUS; SUBCUTANEOUS at 08:30

## 2021-08-20 RX ADMIN — MICONAZOLE NITRATE: 20 POWDER TOPICAL at 22:18

## 2021-08-20 RX ADMIN — MICONAZOLE NITRATE: 20 POWDER TOPICAL at 08:35

## 2021-08-20 RX ADMIN — INSULIN ASPART 1 UNITS: 100 INJECTION, SOLUTION INTRAVENOUS; SUBCUTANEOUS at 18:14

## 2021-08-20 RX ADMIN — INSULIN GLARGINE 18 UNITS: 100 INJECTION, SOLUTION SUBCUTANEOUS at 22:12

## 2021-08-20 RX ADMIN — CARVEDILOL 25 MG: 25 TABLET, FILM COATED ORAL at 08:29

## 2021-08-20 RX ADMIN — DEXAMETHASONE 6 MG: 2 TABLET ORAL at 08:29

## 2021-08-20 RX ADMIN — CARVEDILOL 25 MG: 25 TABLET, FILM COATED ORAL at 18:13

## 2021-08-20 RX ADMIN — SEVELAMER CARBONATE 1600 MG: 800 TABLET, FILM COATED ORAL at 18:13

## 2021-08-20 RX ADMIN — SERTRALINE HYDROCHLORIDE 50 MG: 50 TABLET ORAL at 22:12

## 2021-08-20 RX ADMIN — INSULIN ASPART 4 UNITS: 100 INJECTION, SOLUTION INTRAVENOUS; SUBCUTANEOUS at 12:11

## 2021-08-20 RX ADMIN — SEVELAMER CARBONATE 1600 MG: 800 TABLET, FILM COATED ORAL at 12:10

## 2021-08-20 RX ADMIN — ACETAMINOPHEN 650 MG: 325 TABLET, FILM COATED ORAL at 17:22

## 2021-08-20 RX ADMIN — INSULIN ASPART 4 UNITS: 100 INJECTION, SOLUTION INTRAVENOUS; SUBCUTANEOUS at 18:14

## 2021-08-20 RX ADMIN — AMLODIPINE BESYLATE 5 MG: 5 TABLET ORAL at 22:12

## 2021-08-20 RX ADMIN — HEPARIN SODIUM 5000 UNITS: 5000 INJECTION, SOLUTION INTRAVENOUS; SUBCUTANEOUS at 22:12

## 2021-08-20 RX ADMIN — GUAIFENESIN AND DEXTROMETHORPHAN 5 ML: 100; 10 SYRUP ORAL at 06:13

## 2021-08-20 RX ADMIN — Medication 50 MCG: at 08:29

## 2021-08-20 RX ADMIN — INSULIN ASPART 4 UNITS: 100 INJECTION, SOLUTION INTRAVENOUS; SUBCUTANEOUS at 08:30

## 2021-08-20 RX ADMIN — SEVELAMER CARBONATE 1600 MG: 800 TABLET, FILM COATED ORAL at 08:29

## 2021-08-20 RX ADMIN — HEPARIN SODIUM 5000 UNITS: 5000 INJECTION, SOLUTION INTRAVENOUS; SUBCUTANEOUS at 06:12

## 2021-08-20 RX ADMIN — AMLODIPINE BESYLATE 5 MG: 5 TABLET ORAL at 08:29

## 2021-08-20 ASSESSMENT — MIFFLIN-ST. JEOR: SCORE: 1380.38

## 2021-08-20 ASSESSMENT — ACTIVITIES OF DAILY LIVING (ADL)
ADLS_ACUITY_SCORE: 14

## 2021-08-20 NOTE — PROVIDER NOTIFICATION
[place] hospitalist ALISON with CXR results.    Dr. Garnett called; informed him of ordering provider, patient is on O2 and ABX and page was simply FYI. No orders placed by Dr. Garnett.

## 2021-08-20 NOTE — PROGRESS NOTES
"Potassium   Date Value Ref Range Status   08/20/2021 4.6 3.4 - 5.3 mmol/L Final   04/17/2021 4.2 3.4 - 5.3 mmol/L Final     Hemoglobin   Date Value Ref Range Status   08/20/2021 10.8 (L) 11.7 - 15.7 g/dL Final   04/16/2021 10.9 (L) 11.7 - 15.7 g/dL Final     Creatinine   Date Value Ref Range Status   08/20/2021 5.60 (H) 0.52 - 1.04 mg/dL Final   04/17/2021 5.98 (H) 0.52 - 1.04 mg/dL Final     Urea Nitrogen   Date Value Ref Range Status   08/20/2021 58 (H) 7 - 30 mg/dL Final   04/17/2021 68 (H) 7 - 30 mg/dL Final     Sodium   Date Value Ref Range Status   08/20/2021 139 133 - 144 mmol/L Final   04/17/2021 137 133 - 144 mmol/L Final     INR   Date Value Ref Range Status   04/16/2021 1.14 0.86 - 1.14 Final       DIALYSIS PROCEDURE NOTE  Hepatitis status of previous patient on machine log was checked and verified ok to use with this patients hepatitis status.    Results for BROOKE ARANA (MRN [id number]) as of 8/20/2021 16:34   Ref. Range 8/17/2021 16:11   Hep B Surface Agn Latest Ref Range: Nonreactive  Nonreactive   Hepatitis B Surface Antibody Latest Ref Range: <8.00 m[IU]/mL 0.45     Patient dialyzed for 3 hrs 10 minutes on a K2 bath with a net fluid removal of  3L.  A BFR of 400 ml/min was obtained via a LAVG using 15 gauge needles.      The treatment plan was discussed with Dr. Doe during the treatment.    Total heparin received during the treatment: 0 units.   Needle cannulation sites held x 5 min.     Meds  given: none ordered   Complications: none - patient requested to end treatment an hour early, states \"I can't handle it\" Dr. Doe notified who gave verbal orders OK to shorten run. After some encouragement from this RN, pt agreed to stay on for all but 20 minutes of run.     Person educated: patient. Knowledge base substantial. Barriers to learning: none. Educated on procedure via verbal mode. Patient verbalized understanding. Pt prefers oral education style.     ICEBOAT? Timeout performed " pre-treatment  I: Patient was identified using 2 identifiers  C:  Consent Signed Yes  E: Equipment preventative maintenance is current and dialysis delivery system OK to use  B: Hepatitis B Status: see above  O: Dialysis orders present and complete prior to treatment  A: Vascular access verified and assessed prior to treatment  T: Treatment was performed at a clinically appropriate time  ?: Patient was allowed to ask questions and address concerns prior to treatment  See flowsheet in EPIC for further details and post assessment.  Machine water alarm in place and functioning. Transducer pods intact and checked every 15min.   Pt dialyzed at bedside in room 310 d/t COVID+ status  Chlorine/Chloramine water system checked every 4 hours.  [facility], but pt is currently on a MWF schedule at a designated covid unit.

## 2021-08-20 NOTE — PROGRESS NOTES
VS WNL though slightly hypertensive, 2L NC with humidification. A/Ox4. Pain controlled without medication. Up with lift aw/2. Diet carb control. Voiding once daily. Dialysis scheduled for 8/20 . LS initially wheezy posteriorly, clearing to diminished by shift end.

## 2021-08-20 NOTE — PROGRESS NOTES
Pt is doing ok.  She is feeling less weak.  Breathing ok.  No issues c HD.    AF-VSS  Labs ok  Pt seen on the run via the window.  Communicated via phone due to COVID + status.    1.  ESKD.  Pt is on a MWF schedule for now.  Next run on Mon.  2.  COVID + infxn.  Pt is on dex.  Getting better.  3.  Weakness.  Pt is slowly improving.  4.  FEN.  Pt is on a reg diet. K and phos are ok.

## 2021-08-20 NOTE — PROGRESS NOTES
[facility]    Medicine Progress Note - Hospitalist Service       Date of Admission:  8/16/2021    Assessment & Plan         Supriya Herr is a markedly pleasant 71 year old woman with past medical history that is most notable for obesity, psoriatic arthritis, DM2, HTN, CHF (HFpEF), PAD s/p left AKA, ESRD on HD, JONE and recently diagnosed COVID-19 (8/15), among others; who presented 8/16/2021 with cough, fevers and ongoing generalized weakness due to acute COVID-19 infection.     Acute COVID-19 pneumonia with acute hypoxic respiratory failure  Probable bacterial community acquired pneumonia  Generalized weakness due to above  * Diagnosed with COVID 8/15. She reportedly has been fully vaccinated and her symptoms on admit were mild; in fact, she told Dr. Arriaga they had all resolved except for ongoing severe generalized weakness. On initially evaluation 8/16, she had temp 100, was hypertensive, was not hypoxic. She was too weak to stand unassisted in the ED. Her CXR suggested bilateral COVID pneumonia.  * On 8/17, O2 sats dropped to 80's and placed on 2L O2.  She was started on levofloxacin yesterday for elevated pro-calcitonin and productive cough.  She is feeling much better today and on 1liter oxygen by nasal canula.  - Continue O2, wean as able.  -Continue dexamethasine  - Continue PRN albuterol.  - Continue PRN guaifenesin-dextromethorphan.   - Continue heparin prophylaxis  -Continue levofloxacin (PCN allergy)     Acute thrombocytopenia(mild), suspect due to viral syndrome  Platelet Count   Date Value Ref Range Status   08/20/2021 176 150 - 450 10e3/uL Final   04/16/2021 194 150 - 450 10e9/L Final   Resolved     Chronic anemia due to renal disease and iron deficiency  Hemoglobin   Date Value Ref Range Status   08/20/2021 10.8 (L) 11.7 - 15.7 g/dL Final   08/19/2021 10.9 (L) 11.7 - 15.7 g/dL Final   04/16/2021 10.9 (L) 11.7 - 15.7 g/dL Final   04/09/2021 11.3 (L) 11.7 - 15.7 g/dL Final    Hgb was 10.1 on admit 8/16 (was 10.9 on 4/2021).  - Monitor CBC periodically.  - Continue PTA EPO and IV iron with HD per Nephrology.     Chronic Stage 1 sacral decubitus ulcer, present on admission.  Rash under breasts.  - Continue miconazole powder.  - WOC consult     ESRD on HD  * She normally dialyzes T, Th, Sa. Since COVID diagnosis, has been dialyzing at a [facility] dialysis unit on M, W, F schedule. Dialyzes through a LUE AV graft or fistula. She also has a right upper extremity AV fistula which has reportedly been complicated in the past by steal syndrome, requiring banding; recent outpatient RUE arterial US on 7/21/2021 done in follow up reportedly showed no clear evidence of stenosis.  - Continue HD per Nephrology.     Hypertension (benign essential).  Chronic diastolic CHF  [PTA: amlodipine 5 mg BID; carvedilol 25 mg BID; furosemide 80 mg BID.]  * By history; most recent TTE in 2018 showed preserved LVEF. Lexiscan in 2018 reportedly was negative for inducible ischemia. She reports chronic dyspnea. Spirometry in 2018 was reportedly normal.  - Continue amlodipine; carvedilol; furosemide.  - Other volume management via HD.     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  [PTA: glargine 18U qpm; aspart 4U with breakfast, lunch and dinner.]  Most recent A1c 6.2 in 4/2021.   Recent Labs   Lab 08/20/21  1153 08/20/21  0734 08/20/21  0603 08/19/21  2131 08/19/21  1654 08/19/21  1139   * 150* 149* 231* 176* 130*     - Continue glargine 18U qpm.  - Continue aspart 4U with breakfast, lunch and dinner.  - Continue medium ISS.  - Continue PRN gabapentin for neuropathy and chronic pain     Psoriasis and psoriatic arthritis.  - Continue PTA aprelimast.  - Continue PTA topical medications.     Dyslipidemia.  Chronic and stable.  - Continue atorvastatin.     PAD; h/o left lower extremity infection requiring AKA.  - Continue atorvastatin.     JONE.  Not currently on CPAP.   - monitor oxygen  saturation      Chronic depression and anxiety.  - Continue sertraline     Diet: Combination Diet Regular Diet Adult; Consistent Carb 60 Grams CHO per Meal Diet    DVT Prophylaxis: Heparin SQ  Bradshaw Catheter: Not present  Central Lines: PRESENT       Code Status: Full Code      Disposition Plan   Expected discharge: 08/22/2021 recommended to prior living arrangement once resp status is stable.     The patient's care was discussed with the Patient/nurse    Diallo Juarez MD  Hospitalist Service  [facility]  Securely message with the Vocera Web Console (learn more here)  Text page via [employer] Paging/Directory      Clinically Significant Risk Factors Present on Admission               ______________________________________________________________________    Interval History   Feeling better today- less cough.    Data reviewed today: I reviewed all medications, new labs and imaging results over the last 24 hours. I personally reviewed no images or EKG's today.    Physical Exam   Vital Signs: Temp: 98.2  F (36.8  C) Temp src: Oral BP: 119/65 Pulse: 62   Resp: 16 SpO2: 96 % O2 Device: Nasal cannula with humidification Oxygen Delivery: 1 LPM  Weight: 177 lbs 11.05 oz  Constitutional: awake, alert, cooperative, no apparent distress, and appears stated age  Respiratory: no wheezing, rales or rhonchi   Cardiovascular: regular rate and rhythm, normal S1 and S2, no S3 or S4, and no murmur noted  GI:  normal bowel sounds, soft, non-distended, non-tender  Skin: no rashes  Neuropsychiatric: General: normal, calm and normal eye contact    Data   Recent Labs   Lab 08/20/21  1153 08/20/21  0734 08/20/21  0603 08/19/21  0742 08/18/21  0727 08/17/21  0715   WBC  --  4.3  --  5.3 4.5 4.1   HGB  --  10.8*  --  10.9* 11.0* 9.3*   MCV  --  99  --  98 100 101*   PLT  --  176  --  168 136* 141*   NA  --  139  --   --  140 139   POTASSIUM  --  4.6  --   --  5.2 4.2   CHLORIDE  --  105  --   --  110* 107   CO2   --  27  --   --  22 27   BUN  --  58*  --   --  59* 39*   CR  --  5.60*  --   --  6.73* 5.19*   ANIONGAP  --  7  --   --  8 5   JODY  --  9.1  --   --  8.5 8.9   * 150* 149*  --  171* 105*   ALBUMIN  --  2.6*  --   --  2.7* 2.5*   PROTTOTAL  --   --   --   --   --  6.4*   BILITOTAL  --   --   --   --   --  0.4   ALKPHOS  --   --   --   --   --  96   ALT  --   --   --   --   --  17   AST  --   --   --   --   --  14     Recent Results (from the past 24 hour(s))   XR Chest Port 1 View    Narrative    XR CHEST PORT 1 VIEW 8/19/2021 4:47 PM    HISTORY: F/U COVID    COMPARISON: 8/16/2021      Impression    IMPRESSION: Slight worsening of mild right basilar patchy airspace  opacities. Other mild bilateral airspace opacities are unchanged. No  pleural effusion or thorax. Stable cardiomegaly. Tortuous aorta with  atherosclerotic calcifications.    TUAN MACIAS MD         SYSTEM ID:  [id number]     Medications     - MEDICATION INSTRUCTIONS -       - MEDICATION INSTRUCTIONS -         - MEDICATION INSTRUCTIONS for Dialysis Patients -   Does not apply See Admin Instructions     sodium chloride 0.9%  250 mL Intravenous Once in dialysis/CRRT     sodium chloride 0.9%  300 mL Hemodialysis Machine Once     amLODIPine  5 mg Oral BID     apremilast  30 mg Oral Daily     carvedilol  25 mg Oral BID w/meals     dexamethasone  6 mg Oral Daily     furosemide  80 mg Oral BID     heparin ANTICOAGULANT  5,000 Units Subcutaneous Q8H VÍCTOR     insulin aspart  1-7 Units Subcutaneous TID AC     insulin aspart  1-5 Units Subcutaneous At Bedtime     insulin aspart  4 Units Subcutaneous TID w/meals     insulin glargine  18 Units Subcutaneous At Bedtime     [START ON 8/21/2021] levofloxacin  500 mg Intravenous Q48H     miconazole   Topical BID     - MEDICATION INSTRUCTIONS -   Does not apply Once     sertraline  50 mg Oral At Bedtime     sevelamer carbonate  1,600 mg Oral TID w/meals     sodium chloride (PF)  3 mL Intracatheter Q8H     vitamin D3   50 mcg Oral Daily

## 2021-08-20 NOTE — PLAN OF CARE
A&Ox4, VSS on 1L O2 NC, prefers to have humidification on. Lift. Consistent carb diet, tolerating. Pt had dialysis run today. LS clear/diminished. Denies SOB.

## 2021-08-21 LAB
GLUCOSE BLDC GLUCOMTR-MCNC: 108 MG/DL (ref 70–99)
GLUCOSE BLDC GLUCOMTR-MCNC: 220 MG/DL (ref 70–99)
GLUCOSE BLDC GLUCOMTR-MCNC: 226 MG/DL (ref 70–99)
GLUCOSE BLDC GLUCOMTR-MCNC: 85 MG/DL (ref 70–99)

## 2021-08-21 PROCEDURE — 250N000012 HC RX MED GY IP 250 OP 636 PS 637: Performed by: INTERNAL MEDICINE

## 2021-08-21 PROCEDURE — 250N000011 HC RX IP 250 OP 636: Performed by: HOSPITALIST

## 2021-08-21 PROCEDURE — 250N000013 HC RX MED GY IP 250 OP 250 PS 637: Performed by: HOSPITALIST

## 2021-08-21 PROCEDURE — 99232 SBSQ HOSP IP/OBS MODERATE 35: CPT | Performed by: HOSPITALIST

## 2021-08-21 PROCEDURE — 120N000001 HC R&B MED SURG/OB

## 2021-08-21 PROCEDURE — 250N000013 HC RX MED GY IP 250 OP 250 PS 637: Performed by: INTERNAL MEDICINE

## 2021-08-21 RX ORDER — LEVOFLOXACIN 500 MG/1
TABLET, FILM COATED ORAL
Qty: 4 TABLET | Refills: 0 | Status: SHIPPED | OUTPATIENT
Start: 2021-08-21 | End: 2021-08-23

## 2021-08-21 RX ADMIN — AMLODIPINE BESYLATE 5 MG: 5 TABLET ORAL at 20:51

## 2021-08-21 RX ADMIN — INSULIN ASPART 2 UNITS: 100 INJECTION, SOLUTION INTRAVENOUS; SUBCUTANEOUS at 18:37

## 2021-08-21 RX ADMIN — CARVEDILOL 25 MG: 25 TABLET, FILM COATED ORAL at 18:35

## 2021-08-21 RX ADMIN — MICONAZOLE NITRATE: 20 POWDER TOPICAL at 10:03

## 2021-08-21 RX ADMIN — SEVELAMER CARBONATE 1600 MG: 800 TABLET, FILM COATED ORAL at 13:44

## 2021-08-21 RX ADMIN — LEVOFLOXACIN 500 MG: 5 INJECTION, SOLUTION INTRAVENOUS at 13:44

## 2021-08-21 RX ADMIN — INSULIN GLARGINE 18 UNITS: 100 INJECTION, SOLUTION SUBCUTANEOUS at 21:09

## 2021-08-21 RX ADMIN — SEVELAMER CARBONATE 1600 MG: 800 TABLET, FILM COATED ORAL at 18:34

## 2021-08-21 RX ADMIN — ACETAMINOPHEN 650 MG: 325 TABLET, FILM COATED ORAL at 18:34

## 2021-08-21 RX ADMIN — INSULIN ASPART 4 UNITS: 100 INJECTION, SOLUTION INTRAVENOUS; SUBCUTANEOUS at 18:38

## 2021-08-21 RX ADMIN — Medication 50 MCG: at 10:02

## 2021-08-21 RX ADMIN — DEXAMETHASONE 6 MG: 2 TABLET ORAL at 10:02

## 2021-08-21 RX ADMIN — INSULIN ASPART 4 UNITS: 100 INJECTION, SOLUTION INTRAVENOUS; SUBCUTANEOUS at 13:44

## 2021-08-21 RX ADMIN — MICONAZOLE NITRATE: 20 POWDER TOPICAL at 20:54

## 2021-08-21 RX ADMIN — HEPARIN SODIUM 5000 UNITS: 5000 INJECTION, SOLUTION INTRAVENOUS; SUBCUTANEOUS at 20:51

## 2021-08-21 RX ADMIN — SERTRALINE HYDROCHLORIDE 50 MG: 50 TABLET ORAL at 20:50

## 2021-08-21 RX ADMIN — SEVELAMER CARBONATE 1600 MG: 800 TABLET, FILM COATED ORAL at 10:02

## 2021-08-21 RX ADMIN — HEPARIN SODIUM 5000 UNITS: 5000 INJECTION, SOLUTION INTRAVENOUS; SUBCUTANEOUS at 06:51

## 2021-08-21 RX ADMIN — AMLODIPINE BESYLATE 5 MG: 5 TABLET ORAL at 10:02

## 2021-08-21 RX ADMIN — HEPARIN SODIUM 5000 UNITS: 5000 INJECTION, SOLUTION INTRAVENOUS; SUBCUTANEOUS at 14:52

## 2021-08-21 RX ADMIN — CARVEDILOL 25 MG: 25 TABLET, FILM COATED ORAL at 10:08

## 2021-08-21 ASSESSMENT — ACTIVITIES OF DAILY LIVING (ADL)
ADLS_ACUITY_SCORE: 14
DEPENDENT_IADLS:: CLEANING;LAUNDRY;SHOPPING;MEAL PREPARATION;MEDICATION MANAGEMENT;MONEY MANAGEMENT;TRANSPORTATION
ADLS_ACUITY_SCORE: 14
ADLS_ACUITY_SCORE: 14
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
## 2021-08-21 NOTE — PROVIDER NOTIFICATION
MD Notification    Notified Person: MD    Notified Person Name: Dr. Juarez    Notification Date/Time: 1021 8/21/21    Notification Interaction: Amcom    Purpose of Notification: 310: pt wanting to get PT/OT eval prior to discharge. Please advice. Thank you. Tanya Rosenberg RN    Orders Received: orders placed    Comments:

## 2021-08-21 NOTE — PLAN OF CARE
A&Ox4, VSS on RA. Ax2, Lift. Consistent carb diet, tolerating. LS clear, infrequent congested cough. Denies SOB. Pt awaiting PT/OT eval prior to discharge.

## 2021-08-21 NOTE — PROGRESS NOTES
[id number]: VS WNL; tends to run slightly hypertensive. A/Ox4; great sense of humor. Patient did not c/o pain during shift. Up with lift and assist of 2. Diet carb control. BS active. Flatus positive. Voiding once daily (HD) and refusing furosemide. LS CTA. Patient had HD today and was very tired, refusing Lasix (even with offer of PureWick so she would not have to leave the bed).     Question if coccyx nonblanchable wound is, in fact, vitiligo. No specific wound found during dressing change.

## 2021-08-21 NOTE — PROGRESS NOTES
When pt discharges, she should still go to the [facility] on Monday.  D/w pt and she is aware.

## 2021-08-21 NOTE — CONSULTS
Care Management Initial Consult    General Information  Assessment completed with: Patient,    Type of CM/SW Visit: Initial Assessment    Primary Care Provider verified and updated as needed:     Readmission within the last 30 days:        Reason for Consult: discharge planning  Advance Care Planning: Advance Care Planning Reviewed: present on chart          Communication Assessment  Patient's communication style: spoken language (English or Bilingual)    Hearing Difficulty or Deaf: no   Wear Glasses or Blind: no    Cognitive  Cognitive/Neuro/Behavioral: WDL  Level of Consciousness: alert  Arousal Level: opens eyes spontaneously  Orientation: oriented x 4  Mood/Behavior: cooperative  Best Language: 0 - No aphasia  Speech: logical, spontaneous, clear    Living Environment:   People in home: alone     Current living Arrangements: house      Able to return to prior arrangements: yes       Family/Social Support:  Care provided by: child(dominick)  Provides care for:    Marital Status:   Children          Description of Support System: Supportive, Involved         Current Resources:   Patient receiving home care services: Yes     Community Resources:    Equipment currently used at home: raised toilet seat, shower chair, transfer board, wheelchair, manual  Supplies currently used at home: Wound Care Supplies    Employment/Financial:  Employment Status: retired        Financial Concerns: No concerns identified           Lifestyle & Psychosocial Needs:  Social Determinants of Health     Tobacco Use: Medium Risk     Smoking Tobacco Use: Former Smoker     Smokeless Tobacco Use: Never Used   Alcohol Use:      Frequency of Alcohol Consumption:      Average Number of Drinks:      Frequency of Binge Drinking:    Financial Resource Strain:      Difficulty of Paying Living Expenses:    Food Insecurity:      Worried About Running Out of Food in the Last Year:      Ran Out of Food in the Last Year:    Transportation Needs:       "Lack of Transportation (Medical):      Lack of Transportation (Non-Medical):    Physical Activity:      Days of Exercise per Week:      Minutes of Exercise per Session:    Stress:      Feeling of Stress :    Social Connections:      Frequency of Communication with Friends and Family:      Frequency of Social Gatherings with Friends and Family:      Attends Hinduism Services:      Active Member of Clubs or Organizations:      Attends Club or Organization Meetings:      Marital Status:    Intimate Partner Violence:      Fear of Current or Ex-Partner:      Emotionally Abused:      Physically Abused:      Sexually Abused:    Depression: Not at risk     PHQ-2 Score: 0   Housing Stability:      Unable to Pay for Housing in the Last Year:      Number of Places Lived in the Last Year:      Unstable Housing in the Last Year:        Functional Status:  Prior to admission patient needed assistance:   Dependent ADLs:: Wheelchair-independent  Dependent IADLs:: Cleaning, Laundry, Shopping, Meal Preparation, Medication Management, Money Management, Transportation       Mental Health Status:  Mental Health Status: No Current Concerns             SW consult for discharge planning. Reviewed chart and spoke with patient. Per patient, she lives in duplex and her daughter and grand children live in the upper unit. Per patient, patient's daughter provides PCA services and patient was somewhat independent and utilized wheelchair for mobility. Patients daughter assisted with cleaning, transportation to dialysis and as needed. Unclear how many PCA hours per day at this time. Patient reports wound nurse worked with her recently. PT/OT has not worked with patient at this time but she is requiring assist of 2 plus renetta lift and patient feels that she is able to return at home with this as \"hospital is only following policy\". Patient notes that she is not interested in exploring TCU's at this time. SW to continue to follow with patient as she " still waiting on recommendation from PT/OT      TOMER Juan

## 2021-08-22 ENCOUNTER — APPOINTMENT (OUTPATIENT)
Dept: PHYSICAL THERAPY | Facility: CLINIC | Age: 71
DRG: 177 | End: 2021-08-22
Attending: HOSPITALIST
Payer: MEDICARE

## 2021-08-22 ENCOUNTER — APPOINTMENT (OUTPATIENT)
Dept: OCCUPATIONAL THERAPY | Facility: CLINIC | Age: 71
DRG: 177 | End: 2021-08-22
Attending: HOSPITALIST
Payer: MEDICARE

## 2021-08-22 LAB
GLUCOSE BLDC GLUCOMTR-MCNC: 118 MG/DL (ref 70–99)
GLUCOSE BLDC GLUCOMTR-MCNC: 142 MG/DL (ref 70–99)
GLUCOSE BLDC GLUCOMTR-MCNC: 144 MG/DL (ref 70–99)
GLUCOSE BLDC GLUCOMTR-MCNC: 188 MG/DL (ref 70–99)
GLUCOSE BLDC GLUCOMTR-MCNC: 79 MG/DL (ref 70–99)

## 2021-08-22 PROCEDURE — 250N000013 HC RX MED GY IP 250 OP 250 PS 637: Performed by: INTERNAL MEDICINE

## 2021-08-22 PROCEDURE — 250N000012 HC RX MED GY IP 250 OP 636 PS 637: Performed by: INTERNAL MEDICINE

## 2021-08-22 PROCEDURE — 250N000011 HC RX IP 250 OP 636: Performed by: HOSPITALIST

## 2021-08-22 PROCEDURE — 99232 SBSQ HOSP IP/OBS MODERATE 35: CPT | Performed by: HOSPITALIST

## 2021-08-22 PROCEDURE — 97530 THERAPEUTIC ACTIVITIES: CPT | Mod: GP

## 2021-08-22 PROCEDURE — 97161 PT EVAL LOW COMPLEX 20 MIN: CPT | Mod: GP

## 2021-08-22 PROCEDURE — 120N000001 HC R&B MED SURG/OB

## 2021-08-22 PROCEDURE — 99231 SBSQ HOSP IP/OBS SF/LOW 25: CPT | Performed by: INTERNAL MEDICINE

## 2021-08-22 PROCEDURE — 97166 OT EVAL MOD COMPLEX 45 MIN: CPT | Mod: GO

## 2021-08-22 PROCEDURE — 97530 THERAPEUTIC ACTIVITIES: CPT | Mod: GO

## 2021-08-22 RX ADMIN — INSULIN ASPART 1 UNITS: 100 INJECTION, SOLUTION INTRAVENOUS; SUBCUTANEOUS at 17:21

## 2021-08-22 RX ADMIN — Medication 50 MCG: at 09:25

## 2021-08-22 RX ADMIN — INSULIN ASPART 4 UNITS: 100 INJECTION, SOLUTION INTRAVENOUS; SUBCUTANEOUS at 13:01

## 2021-08-22 RX ADMIN — INSULIN ASPART 4 UNITS: 100 INJECTION, SOLUTION INTRAVENOUS; SUBCUTANEOUS at 17:21

## 2021-08-22 RX ADMIN — HEPARIN SODIUM 5000 UNITS: 5000 INJECTION, SOLUTION INTRAVENOUS; SUBCUTANEOUS at 14:51

## 2021-08-22 RX ADMIN — DEXAMETHASONE 6 MG: 2 TABLET ORAL at 09:24

## 2021-08-22 RX ADMIN — AMLODIPINE BESYLATE 5 MG: 5 TABLET ORAL at 09:24

## 2021-08-22 RX ADMIN — INSULIN GLARGINE 18 UNITS: 100 INJECTION, SOLUTION SUBCUTANEOUS at 22:46

## 2021-08-22 RX ADMIN — HEPARIN SODIUM 5000 UNITS: 5000 INJECTION, SOLUTION INTRAVENOUS; SUBCUTANEOUS at 06:01

## 2021-08-22 RX ADMIN — CARVEDILOL 25 MG: 25 TABLET, FILM COATED ORAL at 17:20

## 2021-08-22 RX ADMIN — CARVEDILOL 25 MG: 25 TABLET, FILM COATED ORAL at 09:19

## 2021-08-22 RX ADMIN — SEVELAMER CARBONATE 1600 MG: 800 TABLET, FILM COATED ORAL at 09:25

## 2021-08-22 RX ADMIN — SEVELAMER CARBONATE 1600 MG: 800 TABLET, FILM COATED ORAL at 13:01

## 2021-08-22 RX ADMIN — INSULIN ASPART 1 UNITS: 100 INJECTION, SOLUTION INTRAVENOUS; SUBCUTANEOUS at 13:02

## 2021-08-22 RX ADMIN — MICONAZOLE NITRATE: 20 POWDER TOPICAL at 09:26

## 2021-08-22 RX ADMIN — SEVELAMER CARBONATE 1600 MG: 800 TABLET, FILM COATED ORAL at 17:20

## 2021-08-22 ASSESSMENT — ACTIVITIES OF DAILY LIVING (ADL)
ADLS_ACUITY_SCORE: 14

## 2021-08-22 NOTE — PROGRESS NOTES
"Care Management Follow Up    Length of Stay (days): 5    Expected Discharge Date: 08/23/2021     Concerns to be Addressed:       Patient plan of care discussed at interdisciplinary rounds: Yes    Anticipated Discharge Disposition:       Anticipated Discharge Services:    Anticipated Discharge DME:      Patient/family educated on Medicare website which has current facility and service quality ratings:    Education Provided on the Discharge Plan:    Patient/Family in Agreement with the Plan:      Referrals Placed by CM/SW:    Private pay costs discussed: Not applicable    Additional Information:  Following for discharge planning. Per PT/OT Pt can return home with [facility].  CC spoke with patient and explained role in discharge plan.  She would like CC to speak with Dtr. Caroline Gray.  CC spoke with Caroline Gray by phone.  Pt lives with them and Caroline Gray and family provide \"some help\"  Caroline Gray is concerned that patient is telling people she has more assist than she actually has.  Caroline Gray is concerned that patient is weaker than baseline and family can't physically lift her.  Pt has never been able to use the sliding board well.    Reviewed PT/OT notes with Dtr.  Dtr is hopeful that patient could come home but would physically like to see her move to make sure she is close to baseline.   Daughter was COVID+ but out of quarantine on Friday (Fully vaccinated).  Per Charge RN is not able to come to the hospital.  CC Reviewed with Bedside RN/PT.  Nurse will set up a video call to have Dtr see how patient transfers/moves.  Pt/Daugther report pt's w/c is \"broken\"  Daughter notes brakes are loose but she is working through [employer] to fix.    Family would like a commode at discharge. Could dispense from [facility] supply (out of pocket cost).  Pt told PT she would like to source on her own.  Dtr states she is willing to get. Bedside nurse to clarify.    If patient moves well would want [facility] RN/PT/OT  Pt/family was provided with the Medicare " Compare list for Home Care.  Discussed associated Medicare star ratings to assist with choice for referrals/discharge planning Yes    Education was given to pt/family that star ratings are updated/maintained by Medicare and can be reviewed by visiting www.medicare.gov Yes    Noted patient has discharge orders/plans for home care including Home (RN/OT/PT/)  Patient was given choice in selecting homecare and chose [facility]  Referral sent (8/22) and confirmed.  Provided contact information on AVS for pt to call if they have questions for ( [facility] [phone])    Daughter can transport at discharge.     Addendum 12:30.  Bedside nurse spoke with daughter who has concerns about Pt discharge today due to mobility and getting her to OP dialysis tomorrow.  Daughter is not sure she can transport her given weakness.  Discussed with MD. Plan for Pt to stay her tonight, receives dialysis here tomorrow then discharge home.  Daughter will work to find transportation to dialysis for Wed.    CC on Monday to follow for HHC (referral to [facility]) and see if daughter feels comfortable transporting home.     Janelle Strong RN

## 2021-08-22 NOTE — PROGRESS NOTES
[facility]    Medicine Progress Note - Hospitalist Service       Date of Admission:  8/16/2021    Assessment & Plan         Supriya Herr is a markedly pleasant 71 year old woman with past medical history that is most notable for obesity, psoriatic arthritis, DM2, HTN, CHF (HFpEF), PAD s/p left AKA, ESRD on HD, JONE and recently diagnosed COVID-19 (8/15), among others; who presented 8/16/2021 with cough, fevers and ongoing generalized weakness due to acute COVID-19 infection.     Acute COVID-19 pneumonia with acute hypoxic respiratory failure  Probable bacterial community acquired pneumonia  Generalized weakness due to above  * Diagnosed with COVID 8/15. She reportedly has been fully vaccinated and her symptoms on admit were mild; in fact, she told Dr. Arriaga they had all resolved except for ongoing severe generalized weakness. On initially evaluation 8/16, she had temp 100, was hypertensive, was not hypoxic. She was too weak to stand unassisted in the ED. Her CXR suggested bilateral COVID pneumonia.  * On 8/17, O2 sats dropped to 80's and placed on 2L O2.  She was started on levofloxacin for elevated pro-calcitonin and productive cough.  She is feeling much better today and is no longer requiring supplemental oxygen.  -Continue dexamethasine  -Continue PRN albuterol.  - Continue PRN guaifenesin-dextromethorphan.   - Continue heparin prophylaxis  -Continue levofloxacin (PCN allergy)     Acute thrombocytopenia(mild), suspect due to viral syndrome  Platelet Count   Date Value Ref Range Status   08/20/2021 176 150 - 450 10e3/uL Final   04/16/2021 194 150 - 450 10e9/L Final   Resolved     Chronic anemia due to renal disease and iron deficiency  Hemoglobin   Date Value Ref Range Status   08/20/2021 10.8 (L) 11.7 - 15.7 g/dL Final   08/19/2021 10.9 (L) 11.7 - 15.7 g/dL Final   04/16/2021 10.9 (L) 11.7 - 15.7 g/dL Final   04/09/2021 11.3 (L) 11.7 - 15.7 g/dL Final   Hgb was 10.1 on admit 8/16  (was 10.9 on 4/2021).  - Monitor CBC periodically.  - Continue PTA EPO and IV iron with HD per Nephrology.     Chronic Stage 1 sacral decubitus ulcer, present on admission.  Rash under breasts.  - Continue miconazole powder.  - WOC consult     ESRD on HD  * She normally dialyzes T, Th, Sa. Since COVID diagnosis, has been dialyzing at a COVID dialysis unit on M, W, F schedule. Dialyzes through a LUE AV graft or fistula. She also has a right upper extremity AV fistula which has reportedly been complicated in the past by steal syndrome, requiring banding; recent outpatient RUE arterial US on 7/21/2021 done in follow up reportedly showed no clear evidence of stenosis.  - Continue HD per Nephrology.     Hypertension (benign essential).  Chronic diastolic CHF  [PTA: amlodipine 5 mg BID; carvedilol 25 mg BID; furosemide 80 mg BID.]  * By history; most recent TTE in 2018 showed preserved LVEF. Lexiscan in 2018 reportedly was negative for inducible ischemia. She reports chronic dyspnea. Spirometry in 2018 was reportedly normal.  - Continue amlodipine; carvedilol; furosemide.  - Other volume management via HD.     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  [PTA: glargine 18U qpm; aspart 4U with breakfast, lunch and dinner.]  Most recent A1c 6.2 in 4/2021.   Recent Labs   Lab 08/22/21  1157 08/22/21  0912 08/22/21  0219 08/21/21  2048 08/21/21  1805 08/21/21  1222   * 79 118* 220* 226* 108*     - Continue glargine 18U qpm.  - Continue aspart 4U with breakfast, lunch and dinner.  - Continue medium ISS.  - Continue PRN gabapentin for neuropathy and chronic pain     Psoriasis and psoriatic arthritis.  - Continue PTA aprelimast.  - Continue PTA topical medications.     Dyslipidemia.  Chronic and stable.  - Continue atorvastatin.     PAD; h/o left lower extremity infection requiring AKA.  - Continue atorvastatin.     JONE.  Not currently on CPAP.   - monitor oxygen saturation      Chronic  depression and anxiety.  - Continue sertraline     Diet: Combination Diet Regular Diet Adult; Consistent Carb 60 Grams CHO per Meal Diet  Diet    DVT Prophylaxis: Heparin SQ  Bradshaw Catheter: Not present  Central Lines: PRESENT       Code Status: Full Code      Disposition Plan   Expected discharge: 08/23/2021 recommended to prior living arrangement once transportation to in-center hemodialysis this week has been arranged.     The patient's care was discussed with the Patient/nurse    Diallo Juarez MD  Hospitalist Service  [facility]  Securely message with the Vocera Web Console (learn more here)  Text page via [employer] Paging/Directory      Clinically Significant Risk Factors Present on Admission               ______________________________________________________________________    Interval History   Feels well     Data reviewed today: I reviewed all medications, new labs and imaging results over the last 24 hours. I personally reviewed no images or EKG's today.    Physical Exam   Vital Signs: Temp: 98.4  F (36.9  C) Temp src: Oral BP: 131/73 Pulse: 61   Resp: 16 SpO2: 97 % O2 Device: None (Room air)    Weight: 177 lbs 11.05 oz  Constitutional: awake, alert, cooperative, no apparent distress, and appears stated age  Respiratory: no wheezing, rales or rhonchi   Cardiovascular: regular rate and rhythm, normal S1 and S2, no S3 or S4, and no murmur noted  GI:  normal bowel sounds, soft, non-distended, non-tender  Skin: no rashes  Neuropsychiatric: General: normal, calm and normal eye contact    Data   Recent Labs   Lab 08/22/21  1157 08/22/21  0912 08/22/21  0219 08/20/21  0734 08/19/21  0742 08/18/21  0727 08/17/21  0715   WBC  --   --   --  4.3 5.3 4.5 4.1   HGB  --   --   --  10.8* 10.9* 11.0* 9.3*   MCV  --   --   --  99 98 100 101*   PLT  --   --   --  176 168 136* 141*   NA  --   --   --  139  --  140 139   POTASSIUM  --   --   --  4.6  --  5.2 4.2   CHLORIDE  --   --   --  105  --   110* 107   CO2  --   --   --  27  --  22 27   BUN  --   --   --  58*  --  59* 39*   CR  --   --   --  5.60*  --  6.73* 5.19*   ANIONGAP  --   --   --  7  --  8 5   JODY  --   --   --  9.1  --  8.5 8.9   * 79 118* 150*  --  171* 105*   ALBUMIN  --   --   --  2.6*  --  2.7* 2.5*   PROTTOTAL  --   --   --   --   --   --  6.4*   BILITOTAL  --   --   --   --   --   --  0.4   ALKPHOS  --   --   --   --   --   --  96   ALT  --   --   --   --   --   --  17   AST  --   --   --   --   --   --  14     No results found for this or any previous visit (from the past 24 hour(s)).  Medications     - MEDICATION INSTRUCTIONS -         - MEDICATION INSTRUCTIONS for Dialysis Patients -   Does not apply See Admin Instructions     amLODIPine  5 mg Oral BID     apremilast  30 mg Oral Daily     carvedilol  25 mg Oral BID w/meals     dexamethasone  6 mg Oral Daily     furosemide  80 mg Oral BID     heparin ANTICOAGULANT  5,000 Units Subcutaneous Q8H VÍCTOR     insulin aspart  1-7 Units Subcutaneous TID AC     insulin aspart  1-5 Units Subcutaneous At Bedtime     insulin aspart  4 Units Subcutaneous TID w/meals     insulin glargine  18 Units Subcutaneous At Bedtime     levofloxacin  500 mg Intravenous Q48H     miconazole   Topical BID     sertraline  50 mg Oral At Bedtime     sevelamer carbonate  1,600 mg Oral TID w/meals     sodium chloride (PF)  3 mL Intracatheter Q8H     vitamin D3  50 mcg Oral Daily

## 2021-08-22 NOTE — PROGRESS NOTES
08/22/21 0800   Quick Adds   Type of Visit Initial PT Evaluation   Living Environment   People in home child(dominick), adult   Current Living Arrangements house   Home Accessibility wheelchair accessible   Transportation Anticipated family or friend will provide   Living Environment Comments Patient lives with her daughter and son-in-law who both work from home and are able to assist patient as needed.    Self-Care   Usual Activity Tolerance moderate   Current Activity Tolerance fair   Regular Exercise No   Equipment Currently Used at Home raised toilet seat;shower chair;transfer board;wheelchair, manual;walker, rolling;hospital bed   Activity/Exercise/Self-Care Comment Patient reports that her daughter provides some assist with ADLs at baseline. She is able to transfer herself to her wheelchair and self-propel manual wheelchair on her own. She has a prosthesis for L LE but has not been able to use it to ambulate.    Disability/Function   Hearing Difficulty or Deaf no   Wear Glasses or Blind no   Concentrating, Remembering or Making Decisions Difficulty no   Difficulty Communicating no   Difficulty Eating/Swallowing no   Fall history within last six months no   Change in Functional Status Since Onset of Current Illness/Injury yes   General Information   Onset of Illness/Injury or Date of Surgery 08/16/21   Referring Physician Diallo Juarez MD   Patient/Family Therapy Goals Statement (PT) to go home today   Pertinent History of Current Problem (include personal factors and/or comorbidities that impact the POC) Patient is 72 YO F admitted 1 day after positive COVID-19 test due to weakness found to have B pneumonia. PMH: obesity, L AKA, psoriatic arthritis, DM2, HTN, CHF, PAD, ESRD on dialysis   Existing Precautions/Restrictions fall   Weight-Bearing Status - LUE full weight-bearing   Weight-Bearing Status - RUE full weight-bearing   Weight-Bearing Status - RLE full weight-bearing   General Observations  Patient in bed upon arrival, agreeable to PT. Activity orders: ambulate with assist   Cognition   Orientation Status (Cognition) oriented x 4   Affect/Mental Status (Cognition) WNL   Follows Commands (Cognition) WNL   Cognitive Status Comments Patient intermittently anxious throughout session due to fear of failure as she has strong motivation to return home today, but easily re-directable and able to calm nerves with deep breathing.    Pain Assessment   Patient Currently in Pain No   Integumentary/Edema   Integumentary/Edema Comments Discoloration on B hands, peripheral IV on R shin   Posture    Posture Protracted shoulders   Range of Motion (ROM)   ROM Comment R LE AROM WFL   Strength   Manual Muscle Testing Quick Adds Deficits observed during functional mobility   Strength Comments Generalized weakness noted during mobility but no focal weakness on R LE or L hip   Bed Mobility   Bed Mobility supine-sit;sit-supine   Supine-Sit [place] (Bed Mobility) supervision   Sit-Supine [place] (Bed Mobility) supervision   Comment (Bed Mobility) Head of bed elevated, use of bed rails   Transfers   Transfers bed-chair transfer   Bed-Chair Transfer   Bed-Chair [place] (Transfers) minimum assist (75% patient effort);verbal cues   Bed/Chair Transfer Comments Patient perform lateral transfer from edge of bed to BSC by incrementally boosting hips over, use of arm rests on BSC, min assist for guiding hips and managing brief.    Gait/Stairs (Locomotion)   Comment (Gait/Stairs) Karolina is non-ambulatory at baseline.    Balance   Balance Comments Good sitting balance on edge of bed, able to doff brief with lateral weight shifting at trunk and hips; karolina does not stand   Sensory Examination   Sensory Perception patient reports no sensory changes   Clinical Impression   Criteria for Skilled Therapeutic Intervention yes, treatment indicated   PT Diagnosis (PT) impaired transfers   Influenced by the following impairments  generalized weakness, decreased activity tolerance, L AKA   Functional limitations due to impairments increased difficulty with fucntional transfers   Clinical Presentation Stable/Uncomplicated   Clinical Presentation Rationale improving medical status, stable vital signs, clinical judgement   Clinical Decision Making (Complexity) low complexity   Therapy Frequency (PT) Daily   Predicted Duration of Therapy Intervention (days/wks) 4 days   Planned Therapy Interventions (PT) bed mobility training;home exercise program;patient/family education;strengthening;transfer training;wheelchair management/propulsion training   Anticipated Equipment Needs at Discharge (PT) commode chair   Risk & Benefits of therapy have been explained evaluation/treatment results reviewed;care plan/treatment goals reviewed;risks/benefits reviewed;current/potential barriers reviewed;participants voiced agreement with care plan;participants included;patient   Clinical Impression Comments SpO2 = 96-97% on room air during transfers   PT Discharge Planning    PT Discharge Recommendation (DC Rec) home with assist;home with home care physical therapy   PT Rationale for DC Rec Patient is currently slightly below baseline level of independence with transfers, requiring min assist +1, but she has supportive family at home to provide assist. Patient would benefit from continued skilled PT with HHPT to progress strength, endurance and transfers to return to baseline; leaving the home would be significantly taxing and patient is w/c bound at baseline.    PT Brief overview of current status  SBA supine <>sit; Min [place] +1 [place] [facility] [place] Evaluation Time   Total Evaluation Time (Minutes) 15

## 2021-08-22 NOTE — PROGRESS NOTES
08/22/21 0939   Quick Adds   Type of Visit Initial Occupational Therapy Evaluation   Living Environment   People in home child(dominick), adult   Current Living Arrangements house   Home Accessibility wheelchair accessible   Transportation Anticipated family or friend will provide   Living Environment Comments Patient lives with her daughter and son-in-law who both work from home and are able to assist patient as needed.    Self-Care   Usual Activity Tolerance moderate   Current Activity Tolerance fair   Regular Exercise No   Equipment Currently Used at Home raised toilet seat;shower chair;transfer board;wheelchair, manual;walker, rolling;hospital bed   Activity/Exercise/Self-Care Comment Pt reports her daughter assist with all IADLs and occasionaly with ADLs but she is typically able to complete dressing, bathing, and bed mobility indep. Grandson dons socks and shoes. W/c bound at baseline but able to transfer self indep. Daughter provides assist with supporting w/c as pt needs to get breaks fixed.    Disability/Function   Fall history within last six months no   General Information   Onset of Illness/Injury or Date of Surgery 08/16/21   Referring Physician Diallo Juarez MD   Patient/Family Therapy Goal Statement (OT) Return home, hopefully today   Additional Occupational Profile Info/Pertinent History of Current Problem Patient is 72 YO F admitted 1 day after positive COVID-19 test due to weakness found to have B pneumonia. PMH: obesity, L AKA, psoriatic arthritis, DM2, HTN, CHF, PAD, ESRD on dialysis   Existing Precautions/Restrictions fall   Left Lower Extremity (Weight-bearing Status) other (see comments)  (AKA)   Cognitive Status Examination   Orientation Status orientation to person, place and time   Follows Commands follows one-step commands;over 90% accuracy   Range of Motion Comprehensive   General Range of Motion no range of motion deficits identified   Strength Comprehensive (MMT)   General  Manual Muscle Testing (MMT) Assessment upper extremity strength deficits identified   Comment, General Manual Muscle Testing (MMT) Assessment Generalized weakness 4/5   Coordination   Upper Extremity Coordination No deficits were identified   Bed Mobility   Bed Mobility supine-sit;sit-supine   Supine-Sit [place] (Bed Mobility) supervision   Sit-Supine [place] (Bed Mobility) supervision   Assistive Device (Bed Mobility) other (see comments)  (HOB elevated)   Transfers   Transfers toilet transfer   Transfer Comments Min A   Activities of Daily Living   BADL Assessment other (see comments)  (Declines)   Clinical Impression   Criteria for Skilled Therapeutic Interventions Met (OT) yes;meets criteria;skilled treatment is necessary   OT Diagnosis Impaired ADLs and funcitonal mobility   OT Problem List-Impairments impacting ADL problems related to;activity tolerance impaired;strength;mobility   Assessment of Occupational Performance 3-5 Performance Deficits   Identified Performance Deficits dressing, functional transfers, w/c mobility   Planned Therapy Interventions (OT) ADL retraining;strengthening;transfer training;home program guidelines;progressive activity/exercise   Clinical Decision Making Complexity (OT) moderate complexity   Therapy Frequency (OT) Daily   Predicted Duration of Therapy 2   Risk & Benefits of therapy have been explained evaluation/treatment results reviewed;care plan/treatment goals reviewed   OT Discharge Planning    OT Discharge Recommendation (DC Rec) Home with assist;home with home care occupational therapy   OT Rationale for DC Rec Pt slightly below baseline for funcitonal mobility d/t deconditioning from hospital stay. Pt able to stand pivot tx with appropriate set-up up of chair with min A, progressing to SBA. Daughter is her PCA and will be able to assist at home.    Total Evaluation Time (Minutes)   Total Evaluation Time (Minutes) 8

## 2021-08-22 NOTE — PROGRESS NOTES
[facility]     Medicine Progress Note - Hospitalist Service       Date of Admission:  8/16/2021     Assessment & Plan         Supriya Herr is a markedly pleasant 71 year old woman with past medical history that is most notable for obesity, psoriatic arthritis, DM2, HTN, CHF (HFpEF), PAD s/p left AKA, ESRD on HD, JONE and recently diagnosed COVID-19 (8/15), among others; who presented 8/16/2021 with cough, fevers and ongoing generalized weakness due to acute COVID-19 infection.     Acute COVID-19 pneumonia with acute hypoxic respiratory failure  Probable bacterial community acquired pneumonia  Generalized weakness due to above  * Diagnosed with COVID 8/15. She reportedly has been fully vaccinated and her symptoms on admit were mild; in fact, she told Dr. Arriaga they had all resolved except for ongoing severe generalized weakness. On initially evaluation 8/16, she had temp 100, was hypertensive, was not hypoxic. She was too weak to stand unassisted in the ED. Her CXR suggested bilateral COVID pneumonia.  * On 8/17, O2 sats dropped to 80's and placed on 2L O2.  She was started on levofloxacin for elevated pro-calcitonin and productive cough.  She is off of oxygen.  -Continue dexamethasine  -Continue PRN albuterol.  - Continue PRN guaifenesin-dextromethorphan.   - Continue heparin prophylaxis  -Continue levofloxacin (PCN allergy)     Acute thrombocytopenia(mild), suspect due to viral syndrome        Platelet Count   Date Value Ref Range Status   08/20/2021 176 150 - 450 10e3/uL Final   04/16/2021 194 150 - 450 10e9/L Final   Resolved     Chronic anemia due to renal disease and iron deficiency        Hemoglobin   Date Value Ref Range Status   08/20/2021 10.8 (L) 11.7 - 15.7 g/dL Final   08/19/2021 10.9 (L) 11.7 - 15.7 g/dL Final   04/16/2021 10.9 (L) 11.7 - 15.7 g/dL Final   04/09/2021 11.3 (L) 11.7 - 15.7 g/dL Final   Hgb was 10.1 on admit 8/16 (was 10.9 on 4/2021).  - Monitor CBC  periodically.  - Continue PTA EPO and IV iron with HD per Nephrology.     Chronic Stage 1 sacral decubitus ulcer, present on admission.  Rash under breasts.  - Continue miconazole powder.  - WOC consult     ESRD on HD  * She normally dialyzes T, Th, Sa. Since COVID diagnosis, has been dialyzing at a COVID dialysis unit on M, W, F schedule. Dialyzes through a LUE AV graft or fistula. She also has a right upper extremity AV fistula which has reportedly been complicated in the past by steal syndrome, requiring banding; recent outpatient RUE arterial US on 7/21/2021 done in follow up reportedly showed no clear evidence of stenosis.  - Continue HD per Nephrology.     Hypertension (benign essential).  Chronic diastolic CHF  [PTA: amlodipine 5 mg BID; carvedilol 25 mg BID; furosemide 80 mg BID.]  * By history; most recent TTE in 2018 showed preserved LVEF. Lexiscan in 2018 reportedly was negative for inducible ischemia. She reports chronic dyspnea. Spirometry in 2018 was reportedly normal.  - Continue amlodipine; carvedilol; furosemide.  - Other volume management via HD.     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  [PTA: glargine 18U qpm; aspart 4U with breakfast, lunch and dinner.]  Most recent A1c 6.2 in 4/2021.            Recent Labs   Lab 08/22/21  1157 08/22/21  0912 08/22/21  0219 08/21/21  2048 08/21/21  1805 08/21/21  1222   * 79 118* 220* 226* 108*      - Continue glargine 18U qpm.  - Continue aspart 4U with breakfast, lunch and dinner.  - Continue medium ISS.  - Continue PRN gabapentin for neuropathy and chronic pain     Psoriasis and psoriatic arthritis.  - Continue PTA aprelimast.  - Continue PTA topical medications.     Dyslipidemia.  Chronic and stable.  - Continue atorvastatin.     PAD; h/o left lower extremity infection requiring AKA.  - Continue atorvastatin.     JONE.  Not currently on CPAP.   - monitor oxygen saturation      Chronic depression and anxiety.  -  Continue sertraline  Diet: Combination Diet Regular Diet Adult; Consistent Carb 60 Grams CHO per Meal Diet  Diet    DVT Prophylaxis: Heparin SQ  Bradshaw Catheter: Not present  Central Lines: PRESENT        Code Status: Full Code       Disposition Plan   Expected discharge: 08/23/2021 recommended to prior living arrangement once transportation to in-center hemodialysis this week has been arranged.  The patient's care was discussed with the Patient/nurse     Diallo Juarez MD  Hospitalist Service  [facility]  Securely message with the Vocera Web Console (learn more here)  Text page via [employer] Paging/Directory        Clinically Significant Risk Factors Present on Admission                ______________________________________________________________________     Interval History   Feels well      Data reviewed today: I reviewed all medications, new labs and imaging results over the last 24 hours. I personally reviewed no images or EKG's today.     Physical Exam   Vital Signs: Temp: 98.4  F (36.9  C) Temp src: Oral BP: 131/73 Pulse: 61   Resp: 16 SpO2: 97 % O2 Device: None (Room air)    Weight: 177 lbs 11.05 oz  Constitutional: awake, alert, cooperative, no apparent distress, and appears stated age  Respiratory: no wheezing, rales or rhonchi   Cardiovascular: regular rate and rhythm, normal S1 and S2, no S3 or S4, and no murmur noted  GI:  normal bowel sounds, soft, non-distended, non-tender  Skin: no rashes  Neuropsychiatric: General: normal, calm and normal eye contact

## 2021-08-22 NOTE — PLAN OF CARE
A&Ox4, VSS on RA. Ax2, Lift. Consistent carb diet, tolerating. LS clear, infrequent congested cough. Denies SOB. Discharge pending.

## 2021-08-22 NOTE — PROGRESS NOTES
Pt is still weak.  Dispo is going to depend on PT/OT eval.  Pt feels fine today and has no complaints.    AF-VSS  Labs ok (8/22)  Pt not examined    1.  ESKD.  HD tmrw per current MWF schedule.  She has been dialysing MWF at [facility] due to COVID + status, but usually runs at [facility] ([facility]) c Dr. Basilio.  Will need to check c [facility] on Monday re the schedule and how long she is due to run there.  O/w will need to get her back on the TRS schedule for [facility].  Orders placed for tmrw.

## 2021-08-22 NOTE — PLAN OF CARE
A/0x4. Pleasant but particular (DO NOT do blood sugars from R hand.)  New IV R mid leg.   BGM controlled over night. CC diet.   S.L   VSS  Waiting PO/OT eval for discharge.

## 2021-08-23 LAB
ALBUMIN SERPL-MCNC: 2.7 G/DL (ref 3.4–5)
ANION GAP SERPL CALCULATED.3IONS-SCNC: 10 MMOL/L (ref 3–14)
BUN SERPL-MCNC: 99 MG/DL (ref 7–30)
CALCIUM SERPL-MCNC: 9.3 MG/DL (ref 8.5–10.1)
CHLORIDE BLD-SCNC: 105 MMOL/L (ref 94–109)
CO2 SERPL-SCNC: 24 MMOL/L (ref 20–32)
CREAT SERPL-MCNC: 6.81 MG/DL (ref 0.52–1.04)
ERYTHROCYTE [DISTWIDTH] IN BLOOD BY AUTOMATED COUNT: 11.7 % (ref 10–15)
GFR SERPL CREATININE-BSD FRML MDRD: 6 ML/MIN/1.73M2
GLUCOSE BLD-MCNC: 82 MG/DL (ref 70–99)
GLUCOSE BLDC GLUCOMTR-MCNC: 106 MG/DL (ref 70–99)
GLUCOSE BLDC GLUCOMTR-MCNC: 188 MG/DL (ref 70–99)
GLUCOSE BLDC GLUCOMTR-MCNC: 191 MG/DL (ref 70–99)
GLUCOSE BLDC GLUCOMTR-MCNC: 71 MG/DL (ref 70–99)
HCT VFR BLD AUTO: 33.9 % (ref 35–47)
HGB BLD-MCNC: 11.1 G/DL (ref 11.7–15.7)
MCH RBC QN AUTO: 31.6 PG (ref 26.5–33)
MCHC RBC AUTO-ENTMCNC: 32.7 G/DL (ref 31.5–36.5)
MCV RBC AUTO: 97 FL (ref 78–100)
PHOSPHATE SERPL-MCNC: 5.2 MG/DL (ref 2.5–4.5)
PLATELET # BLD AUTO: 209 10E3/UL (ref 150–450)
POTASSIUM BLD-SCNC: 4.6 MMOL/L (ref 3.4–5.3)
RBC # BLD AUTO: 3.51 10E6/UL (ref 3.8–5.2)
SODIUM SERPL-SCNC: 139 MMOL/L (ref 133–144)
WBC # BLD AUTO: 4.5 10E3/UL (ref 4–11)

## 2021-08-23 PROCEDURE — 250N000012 HC RX MED GY IP 250 OP 636 PS 637: Performed by: INTERNAL MEDICINE

## 2021-08-23 PROCEDURE — 90937 HEMODIALYSIS REPEATED EVAL: CPT

## 2021-08-23 PROCEDURE — 36415 COLL VENOUS BLD VENIPUNCTURE: CPT | Performed by: INTERNAL MEDICINE

## 2021-08-23 PROCEDURE — 250N000013 HC RX MED GY IP 250 OP 250 PS 637: Performed by: INTERNAL MEDICINE

## 2021-08-23 PROCEDURE — 250N000013 HC RX MED GY IP 250 OP 250 PS 637: Performed by: HOSPITALIST

## 2021-08-23 PROCEDURE — 120N000001 HC R&B MED SURG/OB

## 2021-08-23 PROCEDURE — 250N000011 HC RX IP 250 OP 636: Performed by: HOSPITALIST

## 2021-08-23 PROCEDURE — 258N000003 HC RX IP 258 OP 636: Performed by: INTERNAL MEDICINE

## 2021-08-23 PROCEDURE — 99233 SBSQ HOSP IP/OBS HIGH 50: CPT | Performed by: INTERNAL MEDICINE

## 2021-08-23 PROCEDURE — 82040 ASSAY OF SERUM ALBUMIN: CPT | Performed by: INTERNAL MEDICINE

## 2021-08-23 PROCEDURE — 85027 COMPLETE CBC AUTOMATED: CPT | Performed by: INTERNAL MEDICINE

## 2021-08-23 PROCEDURE — 90935 HEMODIALYSIS ONE EVALUATION: CPT | Performed by: INTERNAL MEDICINE

## 2021-08-23 RX ORDER — ALBUMIN, HUMAN INJ 5% 5 %
50-250 SOLUTION INTRAVENOUS
Status: DISCONTINUED | OUTPATIENT
Start: 2021-08-23 | End: 2021-08-24

## 2021-08-23 RX ORDER — ONDANSETRON 4 MG/1
4 TABLET, ORALLY DISINTEGRATING ORAL EVERY 8 HOURS PRN
Qty: 10 TABLET | Refills: 0 | Status: SHIPPED | OUTPATIENT
Start: 2021-08-23 | End: 2021-08-23

## 2021-08-23 RX ORDER — ONDANSETRON 4 MG/1
4 TABLET, ORALLY DISINTEGRATING ORAL EVERY 8 HOURS PRN
Qty: 10 TABLET | Refills: 0 | Status: ON HOLD | OUTPATIENT
Start: 2021-08-23 | End: 2021-11-23

## 2021-08-23 RX ORDER — LEVOFLOXACIN 500 MG/1
TABLET, FILM COATED ORAL
Qty: 4 TABLET | Refills: 0 | Status: SHIPPED | OUTPATIENT
Start: 2021-08-23 | End: 2021-08-23

## 2021-08-23 RX ORDER — ALBUMIN (HUMAN) 12.5 G/50ML
50 SOLUTION INTRAVENOUS
Status: DISCONTINUED | OUTPATIENT
Start: 2021-08-23 | End: 2021-08-24

## 2021-08-23 RX ORDER — LEVOFLOXACIN 500 MG/1
TABLET, FILM COATED ORAL
Qty: 4 TABLET | Refills: 0 | Status: ON HOLD | OUTPATIENT
Start: 2021-08-23 | End: 2021-11-23

## 2021-08-23 RX ADMIN — INSULIN ASPART 4 UNITS: 100 INJECTION, SOLUTION INTRAVENOUS; SUBCUTANEOUS at 10:17

## 2021-08-23 RX ADMIN — LEVOFLOXACIN 500 MG: 5 INJECTION, SOLUTION INTRAVENOUS at 16:29

## 2021-08-23 RX ADMIN — CARVEDILOL 25 MG: 25 TABLET, FILM COATED ORAL at 10:17

## 2021-08-23 RX ADMIN — DEXAMETHASONE 6 MG: 2 TABLET ORAL at 10:16

## 2021-08-23 RX ADMIN — ACETAMINOPHEN 650 MG: 325 TABLET, FILM COATED ORAL at 22:30

## 2021-08-23 RX ADMIN — SERTRALINE HYDROCHLORIDE 50 MG: 50 TABLET ORAL at 21:09

## 2021-08-23 RX ADMIN — SEVELAMER CARBONATE 1600 MG: 800 TABLET, FILM COATED ORAL at 15:19

## 2021-08-23 RX ADMIN — FUROSEMIDE 80 MG: 40 TABLET ORAL at 10:17

## 2021-08-23 RX ADMIN — INSULIN GLARGINE 18 UNITS: 100 INJECTION, SOLUTION SUBCUTANEOUS at 21:16

## 2021-08-23 RX ADMIN — SODIUM CHLORIDE 250 ML: 9 INJECTION, SOLUTION INTRAVENOUS at 15:35

## 2021-08-23 RX ADMIN — CARVEDILOL 25 MG: 25 TABLET, FILM COATED ORAL at 17:38

## 2021-08-23 RX ADMIN — SEVELAMER CARBONATE 1600 MG: 800 TABLET, FILM COATED ORAL at 10:16

## 2021-08-23 RX ADMIN — ACETAMINOPHEN 650 MG: 325 TABLET, FILM COATED ORAL at 15:20

## 2021-08-23 RX ADMIN — Medication 50 MCG: at 10:16

## 2021-08-23 RX ADMIN — HEPARIN SODIUM 5000 UNITS: 5000 INJECTION, SOLUTION INTRAVENOUS; SUBCUTANEOUS at 10:16

## 2021-08-23 RX ADMIN — AMLODIPINE BESYLATE 5 MG: 5 TABLET ORAL at 21:05

## 2021-08-23 RX ADMIN — GABAPENTIN 100 MG: 100 CAPSULE ORAL at 15:20

## 2021-08-23 RX ADMIN — INSULIN ASPART 1 UNITS: 100 INJECTION, SOLUTION INTRAVENOUS; SUBCUTANEOUS at 16:37

## 2021-08-23 RX ADMIN — SODIUM CHLORIDE 300 ML: 9 INJECTION, SOLUTION INTRAVENOUS at 15:34

## 2021-08-23 RX ADMIN — INSULIN ASPART 4 UNITS: 100 INJECTION, SOLUTION INTRAVENOUS; SUBCUTANEOUS at 16:39

## 2021-08-23 RX ADMIN — GABAPENTIN 100 MG: 100 CAPSULE ORAL at 21:16

## 2021-08-23 RX ADMIN — SEVELAMER CARBONATE 1600 MG: 800 TABLET, FILM COATED ORAL at 17:38

## 2021-08-23 RX ADMIN — AMLODIPINE BESYLATE 5 MG: 5 TABLET ORAL at 10:16

## 2021-08-23 ASSESSMENT — ACTIVITIES OF DAILY LIVING (ADL)
ADLS_ACUITY_SCORE: 14

## 2021-08-23 ASSESSMENT — MIFFLIN-ST. JEOR: SCORE: 1348.38

## 2021-08-23 NOTE — PROGRESS NOTES
Care Management Follow Up    Length of Stay (days): 6    Expected Discharge Date: 08/23/2021     Concerns to be Addressed:       Patient plan of care discussed at interdisciplinary rounds: Yes    Anticipated Discharge Disposition:  Home w/ HHC     Anticipated Discharge Services:    Anticipated Discharge DME:      Patient/family educated on Medicare website which has current facility and service quality ratings:    Education Provided on the Discharge Plan:    Patient/Family in Agreement with the Plan:      Referrals Placed by CM/SW:    Private pay costs discussed: Not applicable    Additional Information:  Call placed to [facility] at [phone] to discuss return to dialysis unit vs going back to her home unit. I spoke to Enedina. She states that the case is still being reviewed by their infectious disease MD. Enedina states the unit will get back to us once a decision has been made. Name and number left for return phone call.     1451 return call received from Enedina ([phone]) at [facility] unit. Enedina states the Infectious Disease Director is ok with patient's return to her home dialysis unit at [facility] location which is a Tues, Thurs, Saturday schedule. Dr. Madrid paged to update on dialysis location.--MD called back and advised to call the [facility] locaton to discuss    [phone]  I called [facility]ialysis at [phone] and spoke to Jalyn. Per Jalyn they are expecting patient back to their dialysis unit on Thursday, August 26th. Page out to Dr. Madrid to update. 1510 MD called back and updated him on conversation with Jalyn from the [facility] unit. Ok to discharge today if that is the plan.     1553 Discussed with Dr. Dmuont, discharge plan is for Tuesday. Message sent to Dr. Moore the discharge day is Tuesday.       Leslie Humphries RN   [facility]   Phone [phone]

## 2021-08-23 NOTE — PROGRESS NOTES
"This patient was seen and examined while on dialysis. Professional oversight of the patient's dialysis care, access care and dialysis related co-morbidities were addressed as necessary with the patient and / or staff.     Access LAVG  BFR - 400 ml.min  UF Goal - -2-3 L   Early part of HD running okay .   Potential discharge today     Exam  BP (!) 167/47   Pulse 66   Temp 98.3  F (36.8  C) (Oral)   Resp 16   Ht 1.753 m (5' 9\")   Wt 77.4 kg (170 lb 10.2 oz)   SpO2 97%   BMI 25.20 kg/m    Limited exam d/ t CoVID + status  No dyspnea at rest.   Lt AVG with good BFR  AAO    Recent Labs   Lab Test 08/23/21  1206 08/23/21  0740 08/23/21  0643 08/20/21  0734   NA  --   --  139 139   POTASSIUM  --   --  4.6 4.6   CHLORIDE  --   --  105 105   CO2  --   --  24 27   ANIONGAP  --   --  10 7   * 71 82 150*   BUN  --   --  99* 58*   CR  --   --  6.81* 5.60*   JODY  --   --  9.3 9.1     Lab Results   Component Value Date    WBC 4.5 08/23/2021    WBC 6.4 04/16/2021     Lab Results   Component Value Date    RBC 3.51 08/23/2021    RBC 3.42 04/16/2021     Lab Results   Component Value Date    HGB 11.1 08/23/2021    HGB 10.9 04/16/2021     Lab Results   Component Value Date    HCT 33.9 08/23/2021    HCT 33.6 04/16/2021     No components found for: MCT  Lab Results   Component Value Date    MCV 97 08/23/2021    MCV 98 04/16/2021     Lab Results   Component Value Date    MCH 31.6 08/23/2021    MCH 31.9 04/16/2021     Lab Results   Component Value Date    MCHC 32.7 08/23/2021    MCHC 32.4 04/16/2021     Lab Results   Component Value Date    RDW 11.7 08/23/2021    RDW 12.7 04/16/2021     Lab Results   Component Value Date     08/23/2021     04/16/2021       A/P   ESRD - dialyses TRS via a CLEM at the [facility] [place] under the care of Dr. Basilio  Plan to discharge to [facility] on discharge until off CoVid isolation.     CoVId + PNA     General weakness - likely d/t CoVID    Anemia in ESRD - Hgb " at goal    Next HD on Wed.     Davide Madrid MD   August 23, 2021

## 2021-08-23 NOTE — DISCHARGE SUMMARY
[facility]  Discharge Summary        Supriya Herr MRN# [id number]   YOB: 1950 Age: 71 year old     Date of Admission: 8/16/2021  Date of Discharge: 8/24/2021  Admitting Physician: Librado Dumont MD  Discharge Physician: Librado Dumont MD     Primary Provider: Noam Jackson  Primary Care Physician Phone Number: [phone]         Discharge Diagnoses:   1. Acute COVID-19 pneumonia with acute hypoxic respiratory failure.  2. Probable superimposed bacterial community acquired pneumonia.  3. Generalized weakness due to above.  4. Acute thrombocytopenia (mild), suspect due to viral syndrome.  5. Chronic stage 1 sacral decubitus ulcer, present on admission.  6. Rash under breasts, suspect fungal.        Other Chronic Medical Problems:      1. Psoriasis and psoriatic arthritis.  2. 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  3. Hypertension (benign essential).  4. Chronic diastolic CHF.  5. PAD; h/o left lower extremity infection requiring AKA.  6. ESRD on HD.  7. Dyslipidemia.  8. JONE. Not on CPAP.  9. Chronic depression and anxiety.  10. Chronic anemia due to renal disease and iron deficiency.        Allergies:         Allergies   Allergen Reactions     Penicillins Rash     Unasyn Rash     No evidence SJS, but very uncomfortable and precipitated multiple provider visits. Would not use penicillins again if other options available.            Discharge Medications:        Current Discharge Medication List      START taking these medications    Details   apixaban ANTICOAGULANT (ELIQUIS) 2.5 MG tablet Take 1 tablet (2.5 mg) by mouth 2 times daily  Qty: 60 tablet, Refills: 0    Associated Diagnoses: Pneumonia due to COVID-19 virus      levofloxacin (LEVAQUIN) 500 MG tablet Take 1 tab daily on 8/25, 8/27  Qty: 4 tablet, Refills: 0    Associated Diagnoses: Community acquired pneumonia, unspecified laterality      ondansetron  (ZOFRAN ODT) 4 MG ODT tab Take 1 tablet (4 mg) by mouth every 8 hours as needed for nausea or vomiting  Qty: 10 tablet, Refills: 0    Associated Diagnoses: Nausea         CONTINUE these medications which have CHANGED    Details   miconazole (MICATIN) 2 % external powder Apply twice daily to skin folds as needed  Qty: 90 g, Refills: 3    Associated Diagnoses: Intertrigo         CONTINUE these medications which have NOT CHANGED    Details   acetaminophen (TYLENOL) 325 MG tablet Take 2 tablets (650 mg) by mouth every 4 hours as needed for mild pain  Qty: 50 tablet, Refills: 0    Associated Diagnoses: ESRD on dialysis (H)      amLODIPine (NORVASC) 5 MG tablet Take 1 tablet (5 mg) by mouth 2 times daily  Qty: 180 tablet, Refills: 3    Associated Diagnoses: Hypertension goal BP (blood pressure) < 140/90      apremilast (OTEZLA) 30 MG tablet Take 1 tablet (30 mg) by mouth daily  Qty: 90 tablet, Refills: 3    Associated Diagnoses: Inverse psoriasis      atorvastatin (LIPITOR) 20 MG tablet TAKE 1 TABLET BY MOUTH EVERY DAY IN THE EVENING  Qty: 90 tablet, Refills: 0    Associated Diagnoses: Hypercholesterolemia      carvedilol (COREG) 25 MG tablet Take 1 tablet (25 mg) by mouth 2 times daily (with meals)  Qty: 180 tablet, Refills: 3    Associated Diagnoses: Essential hypertension      ciclopirox (LOPROX) 0.77 % cream Apply topically 2 times daily  Qty: 90 g, Refills: 1    Associated Diagnoses: Tinea pedis of right foot      Epoetin Martínez (EPOGEN IJ) Inject 800 Units into the vein three times a week tues thurs sat at dialysis      furosemide (LASIX) 80 MG tablet Take 1 tablet (80 mg) by mouth 2 times daily  Qty: 180 tablet, Refills: 3    Associated Diagnoses: CKD (chronic kidney disease) stage 5, GFR less than 15 ml/min (H); Anemia due to stage 5 chronic kidney disease, 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 (H)      gabapentin (NEURONTIN) 100 MG capsule TAKE 1 CAPSULE (100 MG) BY MOUTH 3 TIMES DAILY AS NEEDED (PAIN)  Qty: 60 capsule, Refills:  3    Comments: DX Code Needed  NEED NEW RX.  Associated Diagnoses: Pain of right hand      insulin aspart (NOVOLOG FLEXPEN) 100 UNIT/ML pen INJECT 4 UNITS SUBCUTANEOUSLY WITH BREAKFAST, LUNCH AND DINNER.  Qty: 15 mL, Refills: 3    Associated Diagnoses: Type 2 diabetes mellitus with hyperglycemia, with long-term current use of insulin (H)      insulin glargine (BASAGLAR KWIKPEN) 100 UNIT/ML pen Inject 18 units subcutaneous daily.  Qty: 15 mL, Refills: 4    Comments: If Basaglar is not covered by insurance, may substitute Lantus at same dose and frequency.    Associated Diagnoses: Type 2 diabetes mellitus with diabetic neuropathy, with long-term current use of insulin (H)      Iron Sucrose (VENOFER IV) Inject 50 mg into the vein twice a week At dialysis session (tues/Sat)      sevelamer carbonate (RENVELA) 800 MG tablet Take 800 mg by mouth Take with snacks or supplements Take 2 capsules with meals and 1 with snacks.      vitamin D3 (CHOLECALCIFEROL) 50 mcg (2000 units) tablet Take 1 tablet (50 mcg) by mouth daily  Qty: 100 tablet, Refills: 3    Associated Diagnoses: Vitamin D deficiency      albuterol (PROAIR HFA/PROVENTIL HFA/VENTOLIN HFA) 108 (90 Base) MCG/ACT inhaler Inhale 2 puffs into the lungs every 6 hours as needed for shortness of breath / dyspnea or wheezing  Qty: 3 Inhaler, Refills: 1    Comments: Pharmacy may dispense brand covered by insurance (Proair, or proventil or ventolin or generic albuterol inhaler)  Associated Diagnoses: Cough      blood glucose (ONE TOUCH DELICA) lancing device Device to be used with lancets.needs lancets device for delica lancets  Qty: 1 each, Refills: 1    Associated Diagnoses: Type 2 diabetes mellitus with diabetic chronic kidney disease, unspecified CKD stage, unspecified whether long term insulin use (H)      blood glucose (ONETOUCH ULTRA) test strip TEST YOUR BLOOD SUGAR 3-4 TIMES PER DAY.  Qty: 400 strip, Refills: 3    Comments: DX Code Needed Type 2 diabetes mellitus  "with diabetic nephropathy, with long-term current use of insulin (H) [E11.21, Z79.4]  Associated Diagnoses: Type 2 diabetes mellitus with diabetic nephropathy, with long-term current use of insulin (H)      desonide (DESOWEN) 0.05 % external ointment Apply topically as needed      insulin pen needle (ULTICARE MINI) 31G X 6 MM Use daily or as directed.  Qty: 100 each, Refills: 1    Associated Diagnoses: Type 2 diabetes, HbA1c goal < 7% (H)      nystatin (MYCOSTATIN) 979282 UNIT/GM external ointment Twice daily to finger rash until healed.  Qty: 15 g, Refills: 1    Associated Diagnoses: Pain of finger of right hand      !! order for DME Equipment being ordered: mattress overlay for hospital bed  Wt. 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 order for DME 1 wheelchair  Qty: 1 Device, Refills: 0    Associated Diagnoses: Traumatic amputation of lower extremity above knee, unspecified laterality, subsequent encounter; CKD (chronic kidney disease) stage 3, GFR 30-59 ml/min; Type 2 diabetes mellitus with stage 3 chronic kidney disease, without long-term current use of insulin (H)      sertraline (ZOLOFT) 50 MG tablet TAKE 1 TABLET BY MOUTH AT BEDTIME  Qty: 90 tablet, Refills: 3    Associated Diagnoses: NICOLE (generalized anxiety disorder)      triamcinolone (KENALOG) 0.025 % external ointment Apply topically 2 times daily To rash under breasts and groin as needed  Qty: 80 g, Refills: 1    Associated Diagnoses: Inverse psoriasis       !! - Potential duplicate medications found. Please discuss with provider.              Discharge Instructions and Follow-Up:      Discharge Orders      Home care nursing referral      Home Care " PT Referral for Hospital Discharge      Home Care OT Referral for Hospital Discharge      COVID-19 GetWell Loop Referral      Reason for your hospital stay    COVID-19 infection and pneumonia     Activity    Your activity upon discharge: activity as tolerated     MD face to face encounter    Documentation of Face to Face and Certification for Home Health Services    I certify that patient: Supriya Herr is under my care and that I, or a nurse practitioner or physician's assistant working with me, had a face-to-face encounter that meets the physician face-to-face encounter requirements with this patient on: August 22, 2021.    This encounter with the patient was in whole, or in part, for the following medical condition, which is the primary reason for home health care: COVID-19 infection.    I certify that, based on my findings, the following services are medically necessary home health services: Nursing, Occupational Therapy, and Physical Therapy.    My clinical findings support the need for the above services because: Nurse is needed: To assess vital signs and respiratory status after changes in medications or other medical regimen.., Occupational Therapy Services are needed to assess and treat cognitive ability and address ADL safety due to impairment in mobility., and Physical Therapy Services are needed to assess and treat the following functional impairments: unsteady gait.    Further, I certify that my clinical findings support that this patient is homebound (i.e. absences from home require considerable and taxing effort and are for medical reasons or [employer] services or infrequently or of short duration when for other reasons) because: Requires assistance of another person or specialized equipment to access medical services because patient: Is unable to walk greater than 10 feet without rest...    Based on the above findings. I certify that this patient is confined to the home and needs intermittent  skilled nursing care, physical therapy and/or speech therapy.  The patient is under my care, and I have initiated the establishment of the plan of care.  This patient will be followed by a physician who will periodically review the plan of care.  Physician/Provider to provide follow up care: Noam Jackson    Attending hospital physician (the Medicare certified [facility] provider): Diallo Juarez MD  Physician Signature: See electronic signature associated with these discharge orders.  Date: 8/22/2021     Follow-up and recommended labs and tests     Follow up with primary care provider, Noam Jackson, within 7 days for hospital follow- up.  The following labs/tests are recommended: chest X-ray in 4 weeks.  Continue in-center hemodialysis.     Discharge - Quarantine/Isolation Instruction    Date of symptom onset:     Date of first positive test:    If you tested positive COVID-19 and show symptoms (fever, cough, body aches or trouble breathing):        Stay home and away from others (self-isolate) until:        At least 10 days have passed since your symptoms started. AND...        You've had no fever-and no medicine that reduces fever for 1 full day (24 hours). AND...         Your other symptoms have resolved (gotten better).  If you tested positive for COVID-19 but don't show symptoms:       Stay home and away from others (self-isolate) until at least 10 days have passed since the date of your first positive COVID-19 test.     Diet    Follow this diet upon discharge:       Combination Diet Regular Diet Adult; Consistent Carb 60 Grams CHO per Meal Diet           Consultations This Hospital Stay:      Nephrology.        Admission History:      Please see the H&P by Librado Dumont MD on 8/16/2021 for complete details. Briefly, Supriya Herr is a markedly pleasant 71 year old woman with past medical history that is most notable for obesity, psoriatic arthritis, DM2, HTN, CHF (HFpEF),  PAD s/p left AKA, ESRD on HD, JONE and recently diagnosed COVID-19 (8/15), among others; who presented 8/16/2021 with cough, fevers and ongoing generalized weakness due to acute COVID-19 infection.        Problem Oriented Hospital Course:        Acute COVID-19 pneumonia with acute hypoxic respiratory failure  Probable superimposed bacterial community acquired pneumonia  Generalized weakness due to above.  * Diagnosed with COVID 8/15. She reportedly has been fully vaccinated and her symptoms on admit were mild; in fact, she told Dr. Arriaga they had all resolved except for ongoing severe generalized weakness. On initially evaluation 8/16, she had temp 100, was hypertensive, was not hypoxic. She was too weak to stand unassisted in the ED. Her CXR suggested bilateral COVID pneumonia.  * On 8/17, O2 sats dropped to 80's and placed on 2L O2. Started on dexamethasone. Discussed with ID informally regarding possibly starting remdesivir; however, clinically ID did not feel she warranted taking the risk of remdesivir with known ESRD.  * On 8/19, she was started on levofloxacin for elevated pro-calcitonin and productive cough.    * On 8/22, she was feeling much better and was no longer requiring supplemental oxygen.  - Continue levofloxacin (started 8/19) - stop after 8/27.  - Discharge on apixaban for 30 days.    Acute thrombocytopenia (mild), suspect due to viral syndrome, resolved.  Platelets 144K on admit 8/16. She had some small ecchymoses over her right upper extremity on admit. Plts normalized 8/19.    Chronic anemia due to renal disease and iron deficiency.   Hgb was 10.1 on admit 8/16 (was 10.9 on 4/2021).  Recent Labs   Lab 08/23/21  0643 08/20/21  0734 08/19/21  0742 08/18/21  0727   HGB 11.1* 10.8* 10.9* 11.0*   - Monitor CBC outpatient.  - Continue PTA EPO and IV iron with HD per Nephrology.    Chronic Stage 1 sacral decubitus ulcer, present on admission.  Rash under breasts, suspect fungal.  Was started on  topical miconazole under the breasts. WOC consulted.   - Continue miconazole powder.  - Continue local wound cares.    ESRD on HD.  * She normally dialyzes T, Th, Sa. Since COVID diagnosis, has been dialyzing at a [facility] dialysis unit on M, W, F schedule. Dialyzes through a LUE AV graft or fistula. She also has a right upper extremity AV fistula which has reportedly been complicated in the past by steal syndrome, requiring banding; recent outpatient RUE arterial US on 7/21/2021 done in follow up reportedly showed no clear evidence of stenosis.  - Continue HD outpatient.    Hypertension (benign essential).  Chronic diastolic CHF.  [PTA: amlodipine 5 mg BID; carvedilol 25 mg BID; furosemide 80 mg BID.]  * By history; most recent TTE in 2018 showed preserved LVEF. Lexiscan in 2018 reportedly was negative for inducible ischemia. She reports chronic dyspnea. Spirometry in 2018 was reportedly normal.  - Continue amlodipine; carvedilol; furosemide.  - Other volume management via HD.    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  [PTA: glargine 18U qpm; aspart 4U with breakfast, lunch and dinner.]  Most recent A1c 6.2 in 4/2021.   Recent Labs   Lab 08/24/21  0930 08/23/21  2103 08/23/21  1636 08/23/21  1206 08/23/21  0740 08/23/21  0643   * 191* 188* 106* 71 82   - Continue glargine 18U qpm.  - Continue aspart 4U with breakfast, lunch and dinner.  - Continue PRN gabapentin for neuropathy and chronic pain    Psoriasis and psoriatic arthritis.  - Continue PTA aprelimast.  - Continue PTA topical medications.    Dyslipidemia.  - Chronic and stable.  - Continue atorvastatin.    PAD; h/o left lower extremity infection requiring AKA.  - Continue atorvastatin.    JONE.  Not currently on CPAP.   - Follow-up outpatient.    Chronic depression and anxiety.  - Continue sertraline.    COVID-19 testing.  COVID-19 PCR Results    COVID-19 PCR Results 5/5/20 7/10/20 8/7/20 11/13/20 4/9/21 4/9/21 4/14/21  4/14/21 8/15/21        1455 1455 1117 1117    COVID-19 Virus PCR to [facility] - Result Not Detected Not Detected Not Detected Not Detected Test received-See reflex to IDDL test SARS CoV2 (COVID-19) Virus RT-PCR  Test received-See reflex to IDDL test SARS CoV2 (COVID-19) Virus RT-PCR     COVID-19 Virus PCR to [facility] - Source Nasopharyngeal Nasopharyngeal Nasopharyngeal Nasopharyngeal Nasopharyngeal  Nasopharyngeal     COVID-19 Virus by PCR (External Result)         Positive (A)   SARS-CoV-2 Virus Specimen Source      Nasopharyngeal  Nasopharyngeal    SARS-CoV-2 PCR Result      POSITIVE (A)  NEGATIVE    (A) Abnormal value       Comments are available for some flowsheets but are not being displayed.         COVID-19 Antibody Results, Testing for Immunity    COVID-19 Antibody Results, Testing for Immunity   No data to display.                   Pending Results:        Unresulted Labs Ordered in the Past 30 Days of this Admission     No orders found from 7/17/2021 to 8/17/2021.                Discharge Disposition:      Discharged to home.        Discharge Time:      Approximately 40 minutes.          Key Imaging Studies, Lab Findings and Procedures/Surgeries:        Results for orders placed or performed during the hospital encounter of 08/16/21   XR Chest Port 1 View    Narrative    XR PORTABLE CHEST ONE VIEW   8/16/2021 7:02 PM     HISTORY: Shortness of breath, COVID.    COMPARISON: Chest CT on 11/12/2019      Impression    IMPRESSION: Portable AP view of the chest was obtained. Mild  enlargement of the cardia silhouette. Atherosclerotic vascular  calcification of the aortic knob. Subtle basilar predominant pulmonary  opacities, could be related to patient's known history of COVID-19  infection. No significant pleural effusion or pneumothorax.    SCOTTIE BLACK MD         SYSTEM ID:  [id number]   XR Chest Port 1 View    Narrative    XR CHEST PORT 1 VIEW 8/19/2021 4:47 PM    HISTORY: F/U COVID    COMPARISON:  8/16/2021      Impression    IMPRESSION: Slight worsening of mild right basilar patchy airspace  opacities. Other mild bilateral airspace opacities are unchanged. No  pleural effusion or thorax. Stable cardiomegaly. Tortuous aorta with  atherosclerotic calcifications.    TUAN MACIAS MD         SYSTEM ID:  [id number]     *Note: Due to a large number of results and/or encounters for the requested time period, some results have not been displayed. A complete set of results can be found in Results Review.

## 2021-08-23 NOTE — PROGRESS NOTES
[facility]    Internal Medicine Hospitalist Progress Note  08/23/2021  I evaluated patient on the above date.    Librado Dumont Jr., MD  [phone] (p)  Text Page  Vocera        Assessment & Plan New actions/orders today (08/23/2021) are underlined.    Supriya Herr is a markedly pleasant 71 year old woman with past medical history that is most notable for obesity, psoriatic arthritis, DM2, HTN, CHF (HFpEF), PAD s/p left AKA, ESRD on HD, JONE and recently diagnosed COVID-19 (8/15), among others; who presented 8/16/2021 with cough, fevers and ongoing generalized weakness due to acute COVID-19 infection.    Acute COVID-19 pneumonia with acute hypoxic respiratory failure  Probable superimposed bacterial community acquired pneumonia  Generalized weakness due to above.  * Diagnosed with COVID 8/15. She reportedly has been fully vaccinated and her symptoms on admit were mild; in fact, she told Dr. Arriaga they had all resolved except for ongoing severe generalized weakness. On initially evaluation 8/16, she had temp 100, was hypertensive, was not hypoxic. She was too weak to stand unassisted in the ED. Her CXR suggested bilateral COVID pneumonia.  * On 8/17, O2 sats dropped to 80's and placed on 2L O2. Started on dexamethasone. Discussed with ID informally regarding possibly starting remdesivir; however, clinically ID did not feel she warranted taking the risk of remdesivir with known ESRD.  * On 8/19, she was started on levofloxacin for elevated pro-calcitonin and productive cough.    * On 8/22, she was feeling much better and was no longer requiring supplemental oxygen.  - Continue dexamethasone - stop after 8/26 or at discharge.  - Continue levofloxacin (started 8/19) - stop after 8/27.  - Continue PRN albuterol.  - Continue PRN guaifenesin-dextromethorphan.   - Continue heparin prophylaxis    Acute thrombocytopenia (mild), suspect due to viral syndrome, resolved.  Platelets 144K on admit  8/16. She had some small ecchymoses over her right upper extremity on admit. Plts normalized 8/19.    Chronic anemia due to renal disease and iron deficiency.   Hgb was 10.1 on admit 8/16 (was 10.9 on 4/2021).  Recent Labs   Lab 08/23/21  0643 08/20/21  0734 08/19/21  0742 08/18/21  0727 08/17/21  0715 08/16/21  1816   HGB 11.1* 10.8* 10.9* 11.0* 9.3* 10.1*   - Monitor CBC periodically.  - Continue PTA EPO and IV iron with HD per Nephrology.    Chronic Stage 1 sacral decubitus ulcer, present on admission.  Rash under breasts, suspect fungal.  Was started on topical miconazole under the breasts. WOC consulted.   - Continue miconazole powder.  - Continue local wound cares.    ESRD on HD.  * She normally dialyzes T, Th, Sa. Since COVID diagnosis, has been dialyzing at a [facility] dialysis unit on M, W, F schedule. Dialyzes through a LUE AV graft or fistula. She also has a right upper extremity AV fistula which has reportedly been complicated in the past by steal syndrome, requiring banding; recent outpatient RUE arterial US on 7/21/2021 done in follow up reportedly showed no clear evidence of stenosis.  - Continue HD per Nephrology.    Hypertension (benign essential).  Chronic diastolic CHF.  [PTA: amlodipine 5 mg BID; carvedilol 25 mg BID; furosemide 80 mg BID.]  * By history; most recent TTE in 2018 showed preserved LVEF. Lexiscan in 2018 reportedly was negative for inducible ischemia. She reports chronic dyspnea. Spirometry in 2018 was reportedly normal.  - Continue amlodipine; carvedilol; furosemide.  - Other volume management via HD.    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  [PTA: glargine 18U qpm; aspart 4U with breakfast, lunch and dinner.]  Most recent A1c 6.2 in 4/2021.   Recent Labs   Lab 08/23/21  1206 08/23/21  0740 08/23/21  0643 08/22/21  2226 08/22/21  1707 08/22/21  1157   * 71 82 144* 188* 142*   - Continue glargine 18U qpm.  - Continue aspart 4U with breakfast,  lunch and dinner.  - Continue medium ISS.  - Continue PRN gabapentin for neuropathy and chronic pain    Psoriasis and psoriatic arthritis.  - Continue PTA aprelimast.  - Continue PTA topical medications.    Dyslipidemia.  - Chronic and stable.  - Continue atorvastatin.    PAD; h/o left lower extremity infection requiring AKA.  - Continue atorvastatin.    JONE.  Not currently on CPAP.   - Monitor oxygen saturations.    Chronic depression and anxiety.  - Continue sertraline.    COVID-19 testing.  COVID-19 PCR Results    COVID-19 PCR Results 5/5/20 7/10/20 8/7/20 11/13/20 4/9/21 4/9/21 4/14/21 4/14/21 8/15/21        1455 1455 1117 1117    COVID-19 Virus PCR to [facility] - Result Not Detected Not Detected Not Detected Not Detected Test received-See reflex to IDDL test SARS CoV2 (COVID-19) Virus RT-PCR  Test received-See reflex to IDDL test SARS CoV2 (COVID-19) Virus RT-PCR     COVID-19 Virus PCR to [facility] - Source Nasopharyngeal Nasopharyngeal Nasopharyngeal Nasopharyngeal Nasopharyngeal  Nasopharyngeal     COVID-19 Virus by PCR (External Result)         Positive (A)   SARS-CoV-2 Virus Specimen Source      Nasopharyngeal  Nasopharyngeal    SARS-CoV-2 PCR Result      POSITIVE (A)  NEGATIVE    (A) Abnormal value       Comments are available for some flowsheets but are not being displayed.         COVID-19 Antibody Results, Testing for Immunity    COVID-19 Antibody Results, Testing for Immunity   No data to display.             Diet: Combination Diet Regular Diet Adult; Consistent Carb 60 Grams CHO per Meal Diet  Diet    Prophylaxis: PCD's, ambulation. Heparin subcutaneous.  Bradshaw Catheter: Not present  Code Status: Full Code    Disposition Plan   Expected discharge: Today recommended to prior living arrangement pending above.  Entered: Librado Dumont MD 08/23/2021, 12:59 PM     I spent approximately 40 minutes bedside and on the inpatient unit today managing the care of patient. Over 50% of my time on the unit was  "spent in extensive chart review, counseling the patient/family and/or coordinating care regarding services listed in this note.      Interval History   Doing OK.  Still weak; wants to try to get up to wheelchair.   Otherwise, feels comfortable going home.    -Data reviewed today: I reviewed all new labs and imaging over the last 24 hours. I personally reviewed no images or EKG's today.    Physical Exam    , Blood pressure (!) 155/77, pulse 63, temperature 98.3  F (36.8  C), temperature source Oral, resp. rate 16, height 1.753 m (5' 9\"), weight 77.4 kg (170 lb 10.2 oz), SpO2 97 %, not currently breastfeeding.  Vitals:    08/16/21 1752 08/20/21 0600 08/23/21 0645   Weight: 115.7 kg (255 lb) 80.6 kg (177 lb 11.1 oz) 77.4 kg (170 lb 10.2 oz)     Vital Signs with Ranges  Temp:  [97.7  F (36.5  C)-98.3  F (36.8  C)] 98.3  F (36.8  C)  Pulse:  [60-68] 63  Resp:  [16] 16  BP: (143-160)/(67-81) 155/77  SpO2:  [94 %-99 %] 97 %  Patient Vitals for the past 24 hrs:   BP Temp Temp src Pulse Resp SpO2 Weight   08/23/21 1245 (!) 155/77 -- -- 63 -- -- --   08/23/21 1236 (!) 160/81 -- -- 64 -- -- --   08/23/21 1215 (!) 158/75 98.3  F (36.8  C) Oral 65 16 97 % --   08/23/21 1100 (!) 155/75 98.3  F (36.8  C) Oral 63 16 97 % --   08/23/21 0700 (!) 157/71 97.8  F (36.6  C) Oral 68 16 99 % --   08/23/21 0645 -- -- -- -- -- -- 77.4 kg (170 lb 10.2 oz)   08/22/21 2246 (!) 143/67 97.7  F (36.5  C) Oral 60 16 94 % --   08/22/21 1700 (!) 144/76 98.2  F (36.8  C) Oral 65 16 97 % --     I/O's Last 24 hours  No intake/output data recorded.    Constitutional: Awake, alert, pleasant.  Respiratory: Diminished in bases. No crackles or wheezes.  Cardiovascular: RRR, no m/r/g.  GI: Soft, nt, nd, +BS.  Skin/Integumen: Right leg tender/sensitive, limited exam for edema.  Other:        Data   Recent Labs   Lab 08/23/21  1206 08/23/21  0740 08/23/21  0643 08/20/21  0734 08/19/21  0742 08/18/21  0727 08/17/21  0715   WBC  --   --  4.5 4.3 5.3 4.5 4.1   HGB "  --   --  11.1* 10.8* 10.9* 11.0* 9.3*   MCV  --   --  97 99 98 100 101*   PLT  --   --  209 176 168 136* 141*   NA  --   --  139 139  --  140 139   POTASSIUM  --   --  4.6 4.6  --  5.2 4.2   CHLORIDE  --   --  105 105  --  110* 107   CO2  --   --  24 27  --  22 27   BUN  --   --  99* 58*  --  59* 39*   CR  --   --  6.81* 5.60*  --  6.73* 5.19*   ANIONGAP  --   --  10 7  --  8 5   JODY  --   --  9.3 9.1  --  8.5 8.9   * 71 82 150*  --  171* 105*   ALBUMIN  --   --  2.7* 2.6*  --  2.7* 2.5*   PROTTOTAL  --   --   --   --   --   --  6.4*   BILITOTAL  --   --   --   --   --   --  0.4   ALKPHOS  --   --   --   --   --   --  96   ALT  --   --   --   --   --   --  17   AST  --   --   --   --   --   --  14     Recent Labs   Lab Test 08/23/21  1206 08/23/21  0740 08/23/21  0643 08/22/21  2226 08/22/21  1707 08/16/21  1816 04/17/21  1834 04/17/21  1227 04/17/21  0755 04/17/21  0200 04/16/21  2257 04/16/21  1921   * 71 82 144* 188*   < >  --   --    < >  --   --   --    BGM  --   --   --   --   --   --  174* 197*  --  249* 291* 213*    < > = values in this interval not displayed.     Recent Labs   Lab 08/20/21  0734 08/19/21  0742 08/18/21  0727 08/17/21  0715   CRP 23.7* 34.4* 51.1* 43.0*   DD  --  1.03* 0.69* 0.80*   LDH  --   --  150  --          No results found for this or any previous visit (from the past 24 hour(s)).    Medications   All medications were reviewed.    - MEDICATION INSTRUCTIONS -       - MEDICATION INSTRUCTIONS -         - MEDICATION INSTRUCTIONS for Dialysis Patients -   Does not apply See Admin Instructions     sodium chloride 0.9%  250 mL Intravenous Once in dialysis/CRRT     sodium chloride 0.9%  300 mL Hemodialysis Machine Once     amLODIPine  5 mg Oral BID     apremilast  30 mg Oral Daily     carvedilol  25 mg Oral BID w/meals     dexamethasone  6 mg Oral Daily     furosemide  80 mg Oral BID     heparin ANTICOAGULANT  5,000 Units Subcutaneous Q8H [facility]     insulin aspart  1-7 Units  Subcutaneous TID AC     insulin aspart  1-5 Units Subcutaneous At Bedtime     insulin aspart  4 Units Subcutaneous TID w/meals     insulin glargine  18 Units Subcutaneous At Bedtime     levofloxacin  500 mg Intravenous Q48H     miconazole   Topical BID     - MEDICATION INSTRUCTIONS -   Does not apply Once     sertraline  50 mg Oral At Bedtime     sevelamer carbonate  1,600 mg Oral TID w/meals     sodium chloride (PF)  3 mL Intracatheter Q8H     vitamin D3  50 mcg Oral Daily     sodium chloride 0.9%, acetaminophen **OR** acetaminophen, albumin human, albumin human, albuterol, bisacodyl, glucose **OR** dextrose **OR** glucagon, gabapentin, guaiFENesin-dextromethorphan, lidocaine 4%, lidocaine (buffered or not buffered), lidocaine (buffered or not buffered), lidocaine (buffered or not buffered), - MEDICATION INSTRUCTIONS -, melatonin, ondansetron **OR** ondansetron, polyethylene glycol, sevelamer carbonate, sodium chloride (PF), - MEDICATION INSTRUCTIONS -

## 2021-08-23 NOTE — PROGRESS NOTES
Potassium   Date Value Ref Range Status   08/23/2021 4.6 3.4 - 5.3 mmol/L Final   04/17/2021 4.2 3.4 - 5.3 mmol/L Final     Hemoglobin   Date Value Ref Range Status   08/23/2021 11.1 (L) 11.7 - 15.7 g/dL Final   04/16/2021 10.9 (L) 11.7 - 15.7 g/dL Final     Creatinine   Date Value Ref Range Status   08/23/2021 6.81 (H) 0.52 - 1.04 mg/dL Final   04/17/2021 5.98 (H) 0.52 - 1.04 mg/dL Final     Urea Nitrogen   Date Value Ref Range Status   08/23/2021 99 (H) 7 - 30 mg/dL Final   04/17/2021 68 (H) 7 - 30 mg/dL Final     Sodium   Date Value Ref Range Status   08/23/2021 139 133 - 144 mmol/L Final   04/17/2021 137 133 - 144 mmol/L Final     INR   Date Value Ref Range Status   04/16/2021 1.14 0.86 - 1.14 Final       DIALYSIS PROCEDURE NOTE  Hepatitis status of previous patient on machine log was checked and verified ok to use with this patients hepatitis status.  Patient dialyzed for 3 hrs. on a K2 bath with a net fluid removal of  2.6L.  A BFR of 450 ml/min was obtained via a LUE AVF using 15 gauge needles.      The treatment plan was discussed with Dr. Madrid during the treatment.    Total heparin received during the treatment: 0 units.   Needle cannulation sites held x 8 min.     Meds  given: none   Complications: patient needed to use bathroom and requested to end run 30 minutes early. Okayed by Dr. Madrid      Person educated: patient. Knowledge base substantial. Barriers to learning: none. Educated on procedure via verba mode. Patient verbalized understanding. Pt prefers verbal education style.     ICEBOAT? Timeout performed pre-treatment  I: Patient was identified using 2 identifiers  C:  Consent Signed Yes  E: Equipment preventative maintenance is current and dialysis delivery system OK to use  B: Hepatitis B Surface Antigen: NEGATIVE; Draw Date: 8/17/2021      Hepatitis B Surface Antibody: SUSCEPTIBLE; Draw Date: 8/17/2021  O: Dialysis orders present and complete prior to treatment  A: Vascular access verified and  assessed prior to treatment  T: Treatment was performed at a clinically appropriate time  ?: Patient was allowed to ask questions and address concerns prior to treatment  See flowsheet in EPIC for further details and post assessment.  Machine water alarm in place and functioning. Transducer pods intact and checked every 15min.   Chlorine/Chloramine water system checked every 4 hours.  Outpatient Dialysis at [facility] for covid dialysis. TTS at [facility] when Covid precautions discontinued.    Please remove patient dressing on AVF and AVG needle sites 24 hours after dialysis. If leaking occurs please apply a Band-Aid.

## 2021-08-23 NOTE — PROGRESS NOTES
"BRIEF NUTRITION ASSESSMENT      REASON FOR ASSESSMENT:  Supriya Herr is a 71 year old female seen by Registered Dietitian for LOS      CURRENT DIET AND INTAKE:  Diet:  Consistent Carb 60 gm CHO/meal            Per flow sheets, pt eating %, very inconsistent.    ANTHROPOMETRICS:  Height: 5' 9\"  Weight:  170 lbs 10.18 oz  Body mass index is 25.2 kg/m .   Weight Status: Normal BMI  IBW:  61.3 kg (adjusted for Left AKA)  %IBW: 126%  Weight History:   Wt Readings from Last 10 Encounters:   08/23/21 77.4 kg (170 lb 10.2 oz)   07/30/21 116 kg (255 lb 11.7 oz)   04/26/21 94.3 kg (208 lb)   04/17/21 94.4 kg (208 lb 1.6 oz)   04/09/21 92.3 kg (203 lb 8 oz)   11/04/20 108 kg (238 lb 1.6 oz)   10/05/20 88 kg (194 lb 0.1 oz)   09/25/20 90.7 kg (200 lb)   08/11/20 90.7 kg (200 lb)   08/07/20 83.4 kg (183 lb 13.8 oz)     Vitals:    08/16/21 1749 08/16/21 1752 08/20/21 0600 08/23/21 0645   Weight: 115.7 kg (255 lb) 115.7 kg (255 lb) 80.6 kg (177 lb 11.1 oz) 77.4 kg (170 lb 10.2 oz)       LABS:  Labs noted    MALNUTRITION:  Visual Nutrition Focused Physical Assessment (NFPA) not completed due to restrictions on face-to-face patient care during COVID-19 Pandemic. Do not suspect muscle/fat losses.    NUTRITION INTERVENTION:  Nutrition Diagnosis:  No nutrition diagnosis at this time.    Implementation:  Nutrition Education:  Per Provider order if indicated    FOLLOW UP/MONITORING:   Will re-evaluate in 7 - 10 days, or sooner, if re-consulted.          "

## 2021-08-23 NOTE — PLAN OF CARE
A&O x4, VSS on RA. Tolerating consistent carb diet. Pt denies pain. Pt refused all of her PM meds, her AM heparin injection, and 0400 vitals. LS clear. +BS, +flatus, -BM this shift. Pt up with assist of 2.

## 2021-08-23 NOTE — PROVIDER NOTIFICATION
MD Notification    Notified Person: MD    Notified Person Name: Julia    Notification Date/Time: 8/23/21    Notification Interaction: Paged    Purpose of Notification: Patient is requesting to have prescriptions filled here prior to discharge instead of the [facility], thank you!    Orders Received: Order changed to be filled here.    Comments:

## 2021-08-23 NOTE — PROGRESS NOTES
[facility] Focused Assessment    Attempted to see patient x2 today. The first time patient was waiting for her food to come and declined to have writer assess. The second visit patient stated she was waiting for dialysis and did not want me to see her today. Will attempt to see on 8/24/21.    Jennifer Wynn RN CWOCN.

## 2021-08-23 NOTE — PLAN OF CARE
08/23/2021 [id number]  A&Ox4, specific with timing of cares. Up with lift, Ax2. Refuses positioning at times, pulsate mattress. VSS on RA ex HTN. LS clear. +BS, +flatus, -BM this shift. Continent. Denied pain. BG 71, 106, 188. PIV to LLE. LLE AKA. PI to coccyx, rash to chest and groin WOC following. Dialysis completed today at bedside. Discharge planned once therapies assess back to home with home care tomorrow.

## 2021-08-24 ENCOUNTER — APPOINTMENT (OUTPATIENT)
Dept: OCCUPATIONAL THERAPY | Facility: CLINIC | Age: 71
DRG: 177 | End: 2021-08-24
Payer: MEDICARE

## 2021-08-24 VITALS
BODY MASS INDEX: 23.77 KG/M2 | HEART RATE: 67 BPM | TEMPERATURE: 98.1 F | SYSTOLIC BLOOD PRESSURE: 134 MMHG | OXYGEN SATURATION: 95 % | WEIGHT: 160.5 LBS | HEIGHT: 69 IN | DIASTOLIC BLOOD PRESSURE: 64 MMHG | RESPIRATION RATE: 16 BRPM

## 2021-08-24 LAB
GLUCOSE BLDC GLUCOMTR-MCNC: 167 MG/DL (ref 70–99)
GLUCOSE BLDC GLUCOMTR-MCNC: 220 MG/DL (ref 70–99)
GLUCOSE BLDC GLUCOMTR-MCNC: 89 MG/DL (ref 70–99)

## 2021-08-24 PROCEDURE — 97535 SELF CARE MNGMENT TRAINING: CPT | Mod: GO

## 2021-08-24 PROCEDURE — 90937 HEMODIALYSIS REPEATED EVAL: CPT

## 2021-08-24 PROCEDURE — 258N000003 HC RX IP 258 OP 636: Performed by: INTERNAL MEDICINE

## 2021-08-24 PROCEDURE — 99239 HOSP IP/OBS DSCHRG MGMT >30: CPT | Performed by: INTERNAL MEDICINE

## 2021-08-24 PROCEDURE — 250N000013 HC RX MED GY IP 250 OP 250 PS 637: Performed by: HOSPITALIST

## 2021-08-24 PROCEDURE — 250N000013 HC RX MED GY IP 250 OP 250 PS 637: Performed by: INTERNAL MEDICINE

## 2021-08-24 PROCEDURE — 97530 THERAPEUTIC ACTIVITIES: CPT | Mod: GO

## 2021-08-24 PROCEDURE — 250N000012 HC RX MED GY IP 250 OP 636 PS 637: Performed by: INTERNAL MEDICINE

## 2021-08-24 RX ADMIN — Medication 50 MCG: at 10:04

## 2021-08-24 RX ADMIN — INSULIN ASPART 4 UNITS: 100 INJECTION, SOLUTION INTRAVENOUS; SUBCUTANEOUS at 10:03

## 2021-08-24 RX ADMIN — GABAPENTIN 100 MG: 100 CAPSULE ORAL at 10:03

## 2021-08-24 RX ADMIN — SEVELAMER CARBONATE 1600 MG: 800 TABLET, FILM COATED ORAL at 13:13

## 2021-08-24 RX ADMIN — DEXAMETHASONE 6 MG: 2 TABLET ORAL at 10:03

## 2021-08-24 RX ADMIN — SODIUM CHLORIDE 300 ML: 9 INJECTION, SOLUTION INTRAVENOUS at 10:25

## 2021-08-24 RX ADMIN — INSULIN ASPART 1 UNITS: 100 INJECTION, SOLUTION INTRAVENOUS; SUBCUTANEOUS at 10:03

## 2021-08-24 RX ADMIN — SODIUM CHLORIDE 250 ML: 9 INJECTION, SOLUTION INTRAVENOUS at 10:25

## 2021-08-24 RX ADMIN — SEVELAMER CARBONATE 1600 MG: 800 TABLET, FILM COATED ORAL at 10:03

## 2021-08-24 RX ADMIN — MICONAZOLE NITRATE: 20 POWDER TOPICAL at 10:04

## 2021-08-24 RX ADMIN — ACETAMINOPHEN 650 MG: 325 TABLET, FILM COATED ORAL at 17:34

## 2021-08-24 ASSESSMENT — ACTIVITIES OF DAILY LIVING (ADL)
ADLS_ACUITY_SCORE: 13
ADLS_ACUITY_SCORE: 14
ADLS_ACUITY_SCORE: 15

## 2021-08-24 ASSESSMENT — MIFFLIN-ST. JEOR: SCORE: 1302.38

## 2021-08-24 NOTE — PLAN OF CARE
Occupational Therapy Discharge Summary    Reason for therapy discharge:    Discharged to home with home therapy.    Progress towards therapy goal(s). See goals on Care Plan in [facility] electronic health record for goal details.  Goals partially met.  Barriers to achieving goals:   discharge from facility.    Therapy recommendation(s):    Continued therapy is recommended.  Rationale/Recommendations:  pt below baseline strength and function with self cares and transfer. Would require taxing and significant effort in order to leave the home for therapy.

## 2021-08-24 NOTE — PLAN OF CARE
A&Ox4, VSS on RA. Ax2, Lift. Consistent carb diet, tolerating. LS clear, infrequent congested cough. Denies SOB. Had dialysis run today. Pt cleared for discharge. Discharge information provided to pt and pt's daughter. Pt and daughter verbalized understanding. Pt discharged home with daughter.

## 2021-08-24 NOTE — PROGRESS NOTES
[facility]    Internal Medicine Hospitalist Progress Note  08/24/2021  I evaluated patient on the above date.    Librado Dumont Jr., MD  [phone] (p)  Text Page  Vocera        Assessment & Plan New actions/orders today (08/24/2021) are underlined.    Supriya Herr is a markedly pleasant 71 year old woman with past medical history that is most notable for obesity, psoriatic arthritis, DM2, HTN, CHF (HFpEF), PAD s/p left AKA, ESRD on HD, JONE and recently diagnosed COVID-19 (8/15), among others; who presented 8/16/2021 with cough, fevers and ongoing generalized weakness due to acute COVID-19 infection.    Acute COVID-19 pneumonia with acute hypoxic respiratory failure.  Probable superimposed bacterial community acquired pneumonia.  Generalized weakness due to above.  * Diagnosed with COVID 8/15. She reportedly has been fully vaccinated and her symptoms on admit were mild; in fact, she told Dr. Arriaga they had all resolved except for ongoing severe generalized weakness. On initially evaluation 8/16, she had temp 100, was hypertensive, was not hypoxic. She was too weak to stand unassisted in the ED. Her CXR suggested bilateral COVID pneumonia.  * On 8/17, O2 sats dropped to 80's and placed on 2L O2. Started on dexamethasone. Discussed with ID informally regarding possibly starting remdesivir; however, clinically ID did not feel she warranted taking the risk of remdesivir with known ESRD.  * On 8/19, she was started on levofloxacin for elevated pro-calcitonin and productive cough.    * On 8/22, she was feeling much better and was no longer requiring supplemental oxygen.  - Continue dexamethasone - stop after 8/26 or at discharge.  - Continue levofloxacin (started 8/19) - stop after 8/27.  - Continue PRN albuterol.  - Continue PRN guaifenesin-dextromethorphan.   - Continue heparin prophylaxis    Acute thrombocytopenia (mild), suspect due to viral syndrome, resolved.  Platelets 144K on admit  8/16. She had some small ecchymoses over her right upper extremity on admit. Plts normalized 8/19.    Chronic anemia due to renal disease and iron deficiency.   Hgb was 10.1 on admit 8/16 (was 10.9 on 4/2021).  Recent Labs   Lab 08/23/21  0643 08/20/21  0734 08/19/21  0742 08/18/21  0727   HGB 11.1* 10.8* 10.9* 11.0*   - Monitor CBC periodically.  - Continue PTA EPO and IV iron with HD per Nephrology.    Chronic Stage 1 sacral decubitus ulcer, present on admission.  Rash under breasts, suspect fungal.  Was started on topical miconazole under the breasts. WOC consulted.   - Continue miconazole powder.  - Continue local wound cares.    ESRD on HD.  * She normally dialyzes T, Th, Sa. Since COVID diagnosis, has been dialyzing at a [facility] dialysis unit on M, W, F schedule. Dialyzes through a LUE AV graft or fistula. She also has a right upper extremity AV fistula which has reportedly been complicated in the past by steal syndrome, requiring banding; recent outpatient RUE arterial US on 7/21/2021 done in follow up reportedly showed no clear evidence of stenosis.  - Continue HD per Nephrology.    Hypertension (benign essential).  Chronic diastolic CHF.  [PTA: amlodipine 5 mg BID; carvedilol 25 mg BID; furosemide 80 mg BID.]  * By history; most recent TTE in 2018 showed preserved LVEF. Lexiscan in 2018 reportedly was negative for inducible ischemia. She reports chronic dyspnea. Spirometry in 2018 was reportedly normal.  - Continue amlodipine; carvedilol; furosemide.  - Other volume management via HD.    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  [PTA: glargine 18U qpm; aspart 4U with breakfast, lunch and dinner.]  Most recent A1c 6.2 in 4/2021.   Recent Labs   Lab 08/24/21  0930 08/23/21  2103 08/23/21  1636 08/23/21  1206 08/23/21  0740 08/23/21  0643   * 191* 188* 106* 71 82   - Continue glargine 18U qpm.  - Continue aspart 4U with breakfast, lunch and dinner.  - Continue medium ISS.  -  Continue PRN gabapentin for neuropathy and chronic pain    Psoriasis and psoriatic arthritis.  - Continue PTA aprelimast.  - Continue PTA topical medications.    Dyslipidemia.  - Chronic and stable.  - Continue atorvastatin.    PAD; h/o left lower extremity infection requiring AKA.  - Continue atorvastatin.    JONE.  Not currently on CPAP.   - Monitor oxygen saturations.    Chronic depression and anxiety.  - Continue sertraline.    COVID-19 testing.  COVID-19 PCR Results    COVID-19 PCR Results 5/5/20 7/10/20 8/7/20 11/13/20 4/9/21 4/9/21 4/14/21 4/14/21 8/15/21        1455 1455 1117 1117    COVID-19 Virus PCR to [facility] - Result Not Detected Not Detected Not Detected Not Detected Test received-See reflex to IDDL test SARS CoV2 (COVID-19) Virus RT-PCR  Test received-See reflex to IDDL test SARS CoV2 (COVID-19) Virus RT-PCR     COVID-19 Virus PCR to [facility] - Source Nasopharyngeal Nasopharyngeal Nasopharyngeal Nasopharyngeal Nasopharyngeal  Nasopharyngeal     COVID-19 Virus by PCR (External Result)         Positive (A)   SARS-CoV-2 Virus Specimen Source      Nasopharyngeal  Nasopharyngeal    SARS-CoV-2 PCR Result      POSITIVE (A)  NEGATIVE    (A) Abnormal value       Comments are available for some flowsheets but are not being displayed.         COVID-19 Antibody Results, Testing for Immunity    COVID-19 Antibody Results, Testing for Immunity   No data to display.             Diet: Combination Diet Regular Diet Adult; Consistent Carb 60 Grams CHO per Meal Diet  Diet    Prophylaxis: PCD's, ambulation. Heparin subcutaneous.  Bradshaw Catheter: Not present  Code Status: Full Code    Disposition Plan   Expected discharge: Today recommended to prior living arrangement pending above.  Entered: Librado Dumont MD 08/24/2021, 12:17 PM         Interval History   Has been up since 3 am.  Felt dizzy after dialysis today.  Otherwise, feels that she can go home later today.    -Data reviewed today: I reviewed all new labs and  "imaging over the last 24 hours. I personally reviewed no images or EKG's today.    Physical Exam    , Blood pressure 133/43, pulse 62, temperature 98  F (36.7  C), temperature source Oral, resp. rate 18, height 1.753 m (5' 9\"), weight 72.8 kg (160 lb 7.9 oz), SpO2 92 %, not currently breastfeeding.  Vitals:    08/20/21 0600 08/23/21 0645 08/24/21 0700   Weight: 80.6 kg (177 lb 11.1 oz) 77.4 kg (170 lb 10.2 oz) 72.8 kg (160 lb 7.9 oz)     Vital Signs with Ranges  Temp:  [97.8  F (36.6  C)-98.4  F (36.9  C)] 98  F (36.7  C)  Pulse:  [55-69] 62  Resp:  [16-18] 18  BP: ()/(33-81) 133/43  FiO2 (%):  [95 %] 95 %  SpO2:  [92 %-98 %] 92 %  Patient Vitals for the past 24 hrs:   BP Temp Temp src Pulse Resp SpO2 Weight   08/24/21 1153 133/43 98  F (36.7  C) Oral 62 18 92 % --   08/24/21 1119 137/59 98.1  F (36.7  C) Oral 57 18 -- --   08/24/21 1115 131/55 -- -- 57 -- -- --   08/24/21 1100 137/53 -- -- 61 -- -- --   08/24/21 1045 (!) 118/36 -- -- 61 -- -- --   08/24/21 1030 (!) 145/53 -- -- 61 -- -- --   08/24/21 1015 (!) 86/50 -- -- 61 -- -- --   08/24/21 1000 112/51 -- -- 61 -- -- --   08/24/21 0945 136/41 -- -- 61 -- -- --   08/24/21 0930 (!) 110/33 -- -- 61 -- -- --   08/24/21 0915 (!) 140/62 -- -- 60 -- -- --   08/24/21 0845 133/66 98.4  F (36.9  C) Oral 61 18 95 % --   08/24/21 0700 -- -- -- -- -- -- 72.8 kg (160 lb 7.9 oz)   08/24/21 0021 138/68 98  F (36.7  C) Oral 61 -- 96 % --   08/23/21 2118 (!) 145/62 97.8  F (36.6  C) Axillary 63 16 98 % --   08/23/21 1931 (!) 145/67 97.8  F (36.6  C) Oral 64 -- 98 % --   08/23/21 1557 131/69 97.8  F (36.6  C) Oral 56 16 97 % --   08/23/21 1541 130/66 -- -- 55 -- -- --   08/23/21 1530 138/79 -- -- 57 -- -- --   08/23/21 1515 107/58 -- -- 60 -- -- --   08/23/21 1500 136/76 -- -- 66 -- -- --   08/23/21 1445 (!) 165/65 -- -- 69 -- -- --   08/23/21 1430 (!) 168/62 -- -- 67 -- -- --   08/23/21 1415 (!) 165/63 -- -- 69 -- -- --   08/23/21 1400 (!) 167/61 -- -- 68 -- -- -- "   08/23/21 1345 (!) 167/47 -- -- 66 -- -- --   08/23/21 1330 (!) 170/68 -- -- 67 -- -- --   08/23/21 1315 (!) 167/69 -- -- 67 -- -- --   08/23/21 1300 (!) 179/64 -- -- 69 -- -- --   08/23/21 1245 (!) 155/77 -- -- 63 -- -- --   08/23/21 1236 (!) 160/81 -- -- 64 -- -- --     I/O's Last 24 hours  I/O last 3 completed shifts:  In: 360 [P.O.:360]  Out: 2600 [Other:2600]    Constitutional: Awake, alert, pleasant, conversant.  Respiratory: Diminished in bases. No crackles or wheezes.  Cardiovascular: RRR, no m/r/g.  GI: Soft, nt, nd, +BS.  Skin/Integumen:   Other:        Data   Recent Labs   Lab 08/24/21  0930 08/23/21  2103 08/23/21  1636 08/23/21  0643 08/20/21  0734 08/19/21  0742 08/18/21  0727   WBC  --   --   --  4.5 4.3 5.3 4.5   HGB  --   --   --  11.1* 10.8* 10.9* 11.0*   MCV  --   --   --  97 99 98 100   PLT  --   --   --  209 176 168 136*   NA  --   --   --  139 139  --  140   POTASSIUM  --   --   --  4.6 4.6  --  5.2   CHLORIDE  --   --   --  105 105  --  110*   CO2  --   --   --  24 27  --  22   BUN  --   --   --  99* 58*  --  59*   CR  --   --   --  6.81* 5.60*  --  6.73*   ANIONGAP  --   --   --  10 7  --  8   JODY  --   --   --  9.3 9.1  --  8.5   * 191* 188* 82 150*  --  171*   ALBUMIN  --   --   --  2.7* 2.6*  --  2.7*     Recent Labs   Lab Test 08/24/21  0930 08/23/21  2103 08/23/21  1636 08/23/21  1206 08/23/21  0740 08/16/21  1816 04/17/21  1834 04/17/21  1227 04/17/21  0755 04/17/21  0200 04/16/21  2257 04/16/21  1921   * 191* 188* 106* 71   < >  --   --    < >  --   --   --    BGM  --   --   --   --   --   --  174* 197*  --  249* 291* 213*    < > = values in this interval not displayed.     Recent Labs   Lab 08/20/21  0734 08/19/21  0742 08/18/21  0727   CRP 23.7* 34.4* 51.1*   DD  --  1.03* 0.69*   LDH  --   --  150         No results found for this or any previous visit (from the past 24 hour(s)).    Medications   All medications were reviewed.    - MEDICATION INSTRUCTIONS -         -  MEDICATION INSTRUCTIONS for Dialysis Patients -   Does not apply See Admin Instructions     amLODIPine  5 mg Oral BID     apremilast  30 mg Oral Daily     carvedilol  25 mg Oral BID w/meals     dexamethasone  6 mg Oral Daily     furosemide  80 mg Oral BID     heparin ANTICOAGULANT  5,000 Units Subcutaneous Q8H VÍCTOR     insulin aspart  1-7 Units Subcutaneous TID AC     insulin aspart  1-5 Units Subcutaneous At Bedtime     insulin aspart  4 Units Subcutaneous TID w/meals     insulin glargine  18 Units Subcutaneous At Bedtime     levofloxacin  500 mg Intravenous Q48H     miconazole   Topical BID     sertraline  50 mg Oral At Bedtime     sevelamer carbonate  1,600 mg Oral TID w/meals     sodium chloride (PF)  3 mL Intracatheter Q8H     vitamin D3  50 mcg Oral Daily     acetaminophen **OR** acetaminophen, albuterol, bisacodyl, glucose **OR** dextrose **OR** glucagon, gabapentin, guaiFENesin-dextromethorphan, lidocaine 4%, lidocaine (buffered or not buffered), - MEDICATION INSTRUCTIONS -, melatonin, ondansetron **OR** ondansetron, polyethylene glycol, sevelamer carbonate, sodium chloride (PF)
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
## 2021-08-24 NOTE — PLAN OF CARE
[id number]:    No events this four hour shift. VSS on room air. Infrequent, productive cough. Tylenol given for headache. Heat and gabapentin for right hand discomfort. Declined some of her HS meds and requests minimal interruptions to sleep overnight. Special Covid precautions maintained. Probable discharge to home tomorrow.

## 2021-08-24 NOTE — PROGRESS NOTES
Potassium   Date Value Ref Range Status   08/23/2021 4.6 3.4 - 5.3 mmol/L Final   04/17/2021 4.2 3.4 - 5.3 mmol/L Final     Hemoglobin   Date Value Ref Range Status   08/23/2021 11.1 (L) 11.7 - 15.7 g/dL Final   04/16/2021 10.9 (L) 11.7 - 15.7 g/dL Final     Creatinine   Date Value Ref Range Status   08/23/2021 6.81 (H) 0.52 - 1.04 mg/dL Final   04/17/2021 5.98 (H) 0.52 - 1.04 mg/dL Final     Urea Nitrogen   Date Value Ref Range Status   08/23/2021 99 (H) 7 - 30 mg/dL Final   04/17/2021 68 (H) 7 - 30 mg/dL Final     Sodium   Date Value Ref Range Status   08/23/2021 139 133 - 144 mmol/L Final   04/17/2021 137 133 - 144 mmol/L Final     INR   Date Value Ref Range Status   04/16/2021 1.14 0.86 - 1.14 Final       DIALYSIS PROCEDURE NOTE  Hepatitis status of previous patient on machine log was checked and verified ok to use with this patients hepatitis status.  Patient dialyzed for 2 hrs. on a K2 bath with a net fluid removal of  1L.  A BFR of 350-450 ml/min was obtained via a upper LAVG using 15 gauge needles.      The treatment plan was discussed with Dr. Madrid during the treatment.    Total heparin received during the treatment: 0 units.   Needle cannulation sites held x 10 min.     Meds  given: none ordered   Complications: patient c/o dizziness jail through run and promptly lost consciousness. UF turned off, HOB lowered, 200ml NS bolus given. Patient quickly regained consciousness and BP returned to acceptable limits.MD notified, gave order to reduce UF goal from 2l to 1l       Person educated: patient. Knowledge base minial. Barriers to learning: none. Educated on fluid management/UF/hypotension via verbal mode and demonstration. Patient verbalized understanding. Pt prefers verbal education style.     ICEBOAT? Timeout performed pre-treatment  I: Patient was identified using 2 identifiers  C:  Consent Signed Yes  E: Equipment preventative maintenance is current and dialysis delivery system OK to use  B: Hepatitis  B Surface Antigen: Negative; Draw Date: 8/17/2021      Hepatitis B Surface Antibody: Susceptible; Draw Date: 8/17/2021  O: Dialysis orders present and complete prior to treatment  A: Vascular access verified and assessed prior to treatment  T: Treatment was performed at a clinically appropriate time  ?: Patient was allowed to ask questions and address concerns prior to treatment  See flowsheet in EPIC for further details and post assessment.  Machine water alarm in place and functioning. Transducer pods intact and checked every 15min.   Pt ran bedside due to covid isolation rm 310.  Chlorine/Chloramine water system checked every 4 hours.  Outpatient Dialysis at [facility]    Please remove patient dressing on AVF and AVG needle sites 24 hours after dialysis. If leaking occurs please apply a Band-Aid.

## 2021-08-25 NOTE — PLAN OF CARE
Physical Therapy Discharge Summary    Reason for therapy discharge:    Discharged to home with home therapy.    Progress towards therapy goal(s). See goals on Care Plan in [facility] electronic health record for goal details.  Goals not met.  Barriers to achieving goals:   limited tolerance for therapy and discharge from facility.    Therapy recommendation(s):    Continued therapy is recommended.  Rationale/Recommendations:  Home with 24/7 assist for all mobility and cares and Home PT to maximize return to PLOF.

## 2021-08-30 ENCOUNTER — VIRTUAL VISIT (OUTPATIENT)
Dept: FAMILY MEDICINE | Facility: CLINIC | Age: 71
End: 2021-08-30
Payer: MEDICARE

## 2021-08-30 ENCOUNTER — TELEPHONE (OUTPATIENT)
Dept: FAMILY MEDICINE | Facility: CLINIC | Age: 71
End: 2021-08-30

## 2021-08-30 DIAGNOSIS — Z09 HOSPITAL DISCHARGE FOLLOW-UP: Primary | ICD-10-CM

## 2021-08-30 DIAGNOSIS — N18.6 ESRD (END STAGE RENAL DISEASE) (H): ICD-10-CM

## 2021-08-30 DIAGNOSIS — M62.81 GENERALIZED MUSCLE WEAKNESS: ICD-10-CM

## 2021-08-30 DIAGNOSIS — U07.1 INFECTION DUE TO 2019 NOVEL CORONAVIRUS: ICD-10-CM

## 2021-08-30 PROCEDURE — 99213 OFFICE O/P EST LOW 20 MIN: CPT | Mod: 95 | Performed by: FAMILY MEDICINE

## 2021-08-30 NOTE — PROGRESS NOTES
"Supriya is a 71 year old who is being evaluated via a billable video visit.      How would you like to obtain your AVS? MyChart  If the video visit is dropped, the invitation should be resent by: Text to cell phone: [phone]   Will anyone else be joining your video visit? Yes: Daughter Caroline. How would they like to receive their invitation? Text to cell phone: [phone]     Video Start Time: 2:10 PM    Assessment & Plan     Hospital discharge follow-up  Slowly improved with family help    Infection due to 2019 novel coronavirus  resolving    ESRD (end stage renal disease) (H)  Follow up with consultant as planned.     Generalized muscle weakness  Will be getting PHYSICAL THERAPY/ fall prevention      Review of external notes as documented elsewhere in note  21 minutes spent on the date of the encounter doing chart review, review of outside records, review of test results and interpretation of tests        BMI:   Estimated body mass index is 23.7 kg/m  as calculated from the following:    Height as of 8/16/21: 1.753 m (5' 9\").    Weight as of 8/24/21: 72.8 kg (160 lb 7.9 oz).       See Patient Instructions    No follow-ups on file.    Noam Jackson MD  [facility]    Jean Carlos Epps is a 71 year old who presents for the following health issues     HPI       Hospital Follow-up Visit:    Hospital/Nursing Home/IP Rehab Facility: [facility]  Date of Admission: 8/16/2021  Date of Discharge: 8/24/2021  Reason(s) for Admission: 1. Acute COVID-19 pneumonia with acute hypoxic respiratory failure.  2. Probable superimposed bacterial community acquired pneumonia.  3. Generalized weakness due to above.  4. Acute thrombocytopenia (mild), suspect due to viral syndrome.  5. Chronic stage 1 sacral decubitus ulcer, present on admission.  6. Rash under breasts, suspect fungal.         Was your hospitalization related to COVID-19? YES   How are you feeling today? " Better  In the past 24 hours have you had shortness of breath when speaking, walking, or climbing stairs? My breathing issues have stayed the same  Do you have a cough? I don't have a cough  When is the last time you had a fever greater than 100? None  Are you having any other symptoms? Fatigue, Body aches, Headaches and Trouble finding words   Do you have any other stressors you would like to discuss with your provider? Health Concerns         Was the patient in the ICU or did the patient experience delirium during hospitalization?  Yes         Problems taking medications regularly:  None  Medication changes since discharge: None  Problems adhering to non-medication therapy:  None    Summary of hospitalization:  [facility] discharge summary reviewed  Diagnostic Tests/Treatments reviewed.  Follow up needed: Dialysis  Other Healthcare Providers Involved in Patient s Care:         Homecare and Specialist appointment - set up  Update since discharge: improved. Post Discharge Medication Reconciliation: discharge medications reconciled, continue medications without change.  Plan of care communicated with patient and family              Review of Systems   Constitutional, HEENT, cardiovascular, pulmonary, gi and gu systems are negative, except as otherwise noted.      Objective           Vitals:  No vitals were obtained today due to virtual visit.    Physical Exam   GENERAL: alert, frail, elderly and fatigued  EYES: Eyes grossly normal to inspection.  No discharge or erythema, or obvious scleral/conjunctival abnormalities.  RESP: No audible wheeze, cough, or visible cyanosis.  No visible retractions or increased work of breathing.    SKIN: Visible skin clear. No significant rash, abnormal pigmentation or lesions.  NEURO: Cranial nerves grossly intact.  Mentation and speech appropriate for age.  PSYCH: Mentation appears normal, affect normal/bright, judgement and insight intact, normal speech and appearance  well-groomed.    No results displayed because visit has over 200 results.                  Video-Visit Details    Type of service:  Video Visit    Video End Time:2:40 PM    Originating Location (pt. Location): Home    Distant Location (provider location):  [facility]     Platform used for Video Visit: [employer]

## 2021-08-30 NOTE — TELEPHONE ENCOUNTER
Kati-SHANTA [facility] given verbal ok for SN/OT/PT and reviewed Covid standard precautions.       Ember Shah RN   [facility]

## 2021-08-30 NOTE — TELEPHONE ENCOUNTER
Reason for Call:  Home Health Care    Kati with [facility] called regarding (reason for call): Orders    Orders are needed for this patient.     PT: Eval and treat    OT: Eval and treat    Skilled Nursinx/week for 8 weeks  Wound care orders for stage 1 pressure sore left buttocks.-Wash with soap and water, dry, apply barrier cream. Okay for care give to do wound care on non-nursing days    Okay to remove COVID-19 precautions. Patient is asymptomatic and past 10 day        Phone Number Homecare Nurse can be reached at: [phone]    Can we leave a detailed message on this number? YES        Best Time: any    Call taken on 2021 at 4:16 PM by Jalyn Santacruz

## 2021-09-01 ENCOUNTER — NURSE TRIAGE (OUTPATIENT)
Dept: FAMILY MEDICINE | Facility: CLINIC | Age: 71
End: 2021-09-01

## 2021-09-01 DIAGNOSIS — R30.0 DYSURIA: Primary | ICD-10-CM
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
## 2021-09-03 ENCOUNTER — TELEPHONE (OUTPATIENT)
Dept: FAMILY MEDICINE | Facility: CLINIC | Age: 71
End: 2021-09-03

## 2021-09-03 ENCOUNTER — LAB (OUTPATIENT)
Dept: LAB | Facility: CLINIC | Age: 71
End: 2021-09-03
Payer: MEDICARE

## 2021-09-03 DIAGNOSIS — R30.0 DYSURIA: ICD-10-CM

## 2021-09-03 DIAGNOSIS — N30.00 ACUTE CYSTITIS WITHOUT HEMATURIA: Primary | ICD-10-CM

## 2021-09-03 LAB
ALBUMIN UR-MCNC: 100 MG/DL
APPEARANCE UR: ABNORMAL
BACTERIA #/AREA URNS HPF: ABNORMAL /HPF
BILIRUB UR QL STRIP: NEGATIVE
COLOR UR AUTO: YELLOW
GLUCOSE UR STRIP-MCNC: NEGATIVE MG/DL
HGB UR QL STRIP: NEGATIVE
KETONES UR STRIP-MCNC: NEGATIVE MG/DL
LEUKOCYTE ESTERASE UR QL STRIP: ABNORMAL
NITRATE UR QL: NEGATIVE
PH UR STRIP: 7 [PH] (ref 5–7)
RBC #/AREA URNS AUTO: ABNORMAL /HPF
SP GR UR STRIP: 1.01 (ref 1–1.03)
SQUAMOUS #/AREA URNS AUTO: ABNORMAL /LPF
UROBILINOGEN UR STRIP-ACNC: 0.2 E.U./DL
WBC #/AREA URNS AUTO: ABNORMAL /HPF

## 2021-09-03 PROCEDURE — 81001 URINALYSIS AUTO W/SCOPE: CPT

## 2021-09-03 PROCEDURE — 87086 URINE CULTURE/COLONY COUNT: CPT

## 2021-09-03 RX ORDER — SULFAMETHOXAZOLE/TRIMETHOPRIM 800-160 MG
1 TABLET ORAL 2 TIMES DAILY
Qty: 14 TABLET | Refills: 0 | Status: SHIPPED | OUTPATIENT
Start: 2021-09-03 | End: 2021-09-10

## 2021-09-03 NOTE — TELEPHONE ENCOUNTER
Ok lab only   Orders Placed This Encounter     UA with Microscopic reflex to Culture - lab collect     Standing Status:   Future     Standing Expiration Date:   9/1/2022     Order Specific Question:   [facility]     Answer:   Patient Request

## 2021-09-03 NOTE — TELEPHONE ENCOUNTER
Reason for Call: Request for an order or referral:    Order or referral being requested: OT 1 a week for 4 weeks for ADL transfer safety    Date needed: as soon as possible    Has the patient been seen by the PCP for this problem? YES    Additional comments: Patient tested positive for Covid on August 15th. No symptoms or fever present at this time. Homecare is wondering when these restrictions will be lifted.     Phone number [phone] [facility]    Best Time:  any    Can we leave a detailed message on this number?  YES    Call taken on 9/3/2021 at 1:20 PM by Sisi James

## 2021-09-03 NOTE — TELEPHONE ENCOUNTER
Please call her   looks lime a UTI but not sure until UC back   so she can either start now on   Orders Placed This Encounter     sulfamethoxazole-trimethoprim (BACTRIM DS) 800-160 MG tablet     Sig: Take 1 tablet by mouth 2 times daily for 7 days     Dispense:  14 tablet     Refill:  0     Or wait until culture is back

## 2021-09-03 NOTE — TELEPHONE ENCOUNTER
Called daughter and notified - will bring in urine sample ASAP.    Kamryn Bloom RN  [facility]

## 2021-09-03 NOTE — TELEPHONE ENCOUNTER
Reason for Call:  Fax DME order    Detailed comments: Patient needs order for hospital bed from 7/24 along with appointment notes. Please fax to [facility] at [phone]    Phone Number [phone] [facility]      Best Time: any    Can we leave a detailed message on this number? YES    Call taken on 9/3/2021 at 1:29 PM by Sisi James

## 2021-09-03 NOTE — TELEPHONE ENCOUNTER
Called pt's daughter to notify, will  abx and start taking.      DME order and visit notes faxed to [phone].     Kamryn Bloom RN  [facility]

## 2021-09-03 NOTE — TELEPHONE ENCOUNTER
Called home care and gave requested verbal orders.    Home care RN reports that pt's temp today was 98.1 and other symptoms are improving - wondering if they can get clearance to end COVID precautions during home visits.    Advised RN that CDC guidelines for ending isolation are as follows:  You can be around others after:    -10 days since symptoms first appeared and  -24 hours with no fever without the use of fever-reducing medications and  -Other symptoms of COVID-19 are improving*    Home care RN unsure if pt has been taking any tylenol, will talk with daughter and call back on Tues 9/7 with update.    Kamryn Bloom, SHANTA  [facility]

## 2021-09-04 LAB — BACTERIA UR CULT: NORMAL

## 2021-09-04 NOTE — TELEPHONE ENCOUNTER
Refilled per [facility] refill policy.    Naty Boyle RN     Implemented All Universal Safety Interventions:  [place] to call system. Call bell, personal items and telephone within reach. Instruct patient to call for assistance. Room bathroom lighting operational. Non-slip footwear when patient is off stretcher. Physically safe environment: no spills, clutter or unnecessary equipment. Stretcher in lowest position, wheels locked, appropriate side rails in place.

## 2021-09-06 DIAGNOSIS — M79.641 PAIN OF RIGHT HAND: ICD-10-CM

## 2021-09-07 ENCOUNTER — TELEPHONE (OUTPATIENT)
Dept: FAMILY MEDICINE | Facility: CLINIC | Age: 71
End: 2021-09-07

## 2021-09-07 RX ORDER — GABAPENTIN 100 MG/1
100 CAPSULE ORAL 3 TIMES DAILY PRN
Qty: 60 CAPSULE | Refills: 3 | Status: SHIPPED | OUTPATIENT
Start: 2021-09-07 | End: 2021-10-07

## 2021-09-07 NOTE — TELEPHONE ENCOUNTER
Called pt's daughter and notified. Verbalized understanding, will finish course of abx.    Kamryn Bloom RN  [facility]

## 2021-09-07 NOTE — TELEPHONE ENCOUNTER
"Dr. Jackson,    Called pt's daughter to notify of lab results, Caroline Gray requesting advice on pt's symptoms:    Daughter reports patient has been haing some word-finding difficulty at times, patient \"just doesn't seem completely like herself\", is slightly more confused than usual. Has had some episodes of generalized weakness since discharging from hospital, but also has not had any PT/OT since coming home. Denies any focal deficits.    Was thinking that UTI could have explained these symptoms, but since UC was negative pt's daughter wondering what could explain symptoms / should pt have further follow-up?    Kamryn Bloom RN  [facility]    "

## 2021-09-07 NOTE — TELEPHONE ENCOUNTER
"Dr Jackson    Daughter stated she was to stop the antibiotic see lab noted copied to message  Please call patient  Urine culture showed \" mixed shree\" so no clear UTI, can stop antibiotic if she started it    So please clarify stop or not regarding antibiotic    Viridiana Sprague RN   [facility]        "

## 2021-09-07 NOTE — TELEPHONE ENCOUNTER
Because her daughter notes that she is ? Confused we need to finish treatment for possible UTI   when we see mixed shree then we do not know for sure as to whether or not there really is a UTI   if she was feeling fine I would stop it   as she is not feeling fine we alison to finish the treatment

## 2021-09-08 ENCOUNTER — TELEPHONE (OUTPATIENT)
Dept: FAMILY MEDICINE | Facility: CLINIC | Age: 71
End: 2021-09-08

## 2021-09-08 NOTE — TELEPHONE ENCOUNTER
Reason for Call:  Form, our goal is to have forms completed with 72 hours, however, some forms may require a visit or additional information.    Type of letter, form or note:  Home Health Certification add on discipline (09/03/21)    Who is the form from?: Home care [facility]     Where did the form come from: form was faxed in    What clinic location was the form placed at?: [facility]    Where the form was placed: Dr. Jackson's  Box/Folder    What number is listed as a contact on the form?:   PHONE: [phone]  FAX: [phone]       Additional comments:   Please review, sign, date and fax back to the number listed above.    Call taken on 9/8/2021 at 11:37 AM by Shahida Bowman

## 2021-09-09 ENCOUNTER — TELEPHONE (OUTPATIENT)
Dept: FAMILY MEDICINE | Facility: CLINIC | Age: 71
End: 2021-09-09

## 2021-09-09 DIAGNOSIS — Z53.9 DIAGNOSIS NOT YET DEFINED: Primary | ICD-10-CM

## 2021-09-09 PROCEDURE — G0180 MD CERTIFICATION HHA PATIENT: HCPCS | Performed by: FAMILY MEDICINE

## 2021-09-09 NOTE — TELEPHONE ENCOUNTER
Called home care and gave verbal OK. Pt has been fever-free for >1 week, symptoms have improved, onset of symptoms was ~1 month ago.    Kamryn Bloom RN  [facility]

## 2021-09-09 NOTE — TELEPHONE ENCOUNTER
Forms completed and faxed back to [facility] Certification @ [phone]. Placed into [employer] to scan into patients chart.   Hernán Goyal MA

## 2021-09-10 ENCOUNTER — OFFICE VISIT (OUTPATIENT)
Dept: DERMATOLOGY | Facility: CLINIC | Age: 71
End: 2021-09-10
Payer: MEDICARE

## 2021-09-10 ENCOUNTER — OFFICE VISIT (OUTPATIENT)
Dept: ORTHOPEDICS | Facility: CLINIC | Age: 71
End: 2021-09-10
Payer: MEDICARE

## 2021-09-10 DIAGNOSIS — B35.1 OM (ONYCHOMYCOSIS): ICD-10-CM

## 2021-09-10 DIAGNOSIS — Z87.2 HISTORY OF SKIN ULCER: ICD-10-CM

## 2021-09-10 DIAGNOSIS — L40.8 INVERSE PSORIASIS: Primary | ICD-10-CM

## 2021-09-10 DIAGNOSIS — L30.4 INTERTRIGO: ICD-10-CM

## 2021-09-10 DIAGNOSIS — L84 TYLOMA: ICD-10-CM

## 2021-09-10 DIAGNOSIS — E11.49 TYPE II OR UNSPECIFIED TYPE DIABETES MELLITUS WITH NEUROLOGICAL MANIFESTATIONS, NOT STATED AS UNCONTROLLED(250.60) (H): Primary | ICD-10-CM

## 2021-09-10 PROCEDURE — 99213 OFFICE O/P EST LOW 20 MIN: CPT | Performed by: DERMATOLOGY

## 2021-09-10 PROCEDURE — 99214 OFFICE O/P EST MOD 30 MIN: CPT | Performed by: PODIATRIST

## 2021-09-10 ASSESSMENT — PAIN SCALES - GENERAL: PAINLEVEL: NO PAIN (0)

## 2021-09-10 NOTE — PROGRESS NOTES
Chief Complaint   Patient presents with     RECHECK     diabetic foot exam                Allergies   Allergen Reactions     Penicillins Rash     Unasyn Rash     No evidence SJS, but very uncomfortable and precipitated multiple provider visits. Would not use penicillins again if other options available.          Subjective: Supriya is a 69 year old female who presents to the clinic today for a diabetic foot exam and management.  Her nails do get long.  Recently had a breakthrough case of COVID and was hospitalized.      Objective    Hemoglobin A1C   Date Value Ref Range Status   04/09/2021 6.2 (H) 0 - 5.6 % Final     Comment:     Normal <5.7% Prediabetes 5.7-6.4%  Diabetes 6.5% or higher - adopted from ADA   consensus guidelines.           Non-palpable DP and PT pulses BL.   Equinus noted BL. Pes planus with rigid toe deformities noted to lesser digits on the right. Left AKA noted.   Nails are thickened, discolored, elongated, with subungual debris consistent with onychomycosis.          Assessment: DM2 with left AKA and neuropathy - presenting for a diabetic foot exam.   Onychomycosis.   Tyloma     Plan:   - Pt seen and evaluated  - Nails debrided x 5.  - Tyloma debrided x 1  - Cont compression socks.  - Silicone toe sleeve to the 3rd digit.   - See again in 3 months.

## 2021-09-10 NOTE — PATIENT INSTRUCTIONS
- Continue apremilast   - Can use STEROID triamcinolone 0.025% ointment twice daily as needed to affected inframammary and groin fold areas   - Can use ANTIFUNGAL miconazole 2% powder twice daily as needed to affected skin fold areas
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
## 2021-09-10 NOTE — LETTER
9/10/2021         RE: Supriya Herr  [address]  [facility] [address]        Dear Colleague,    Thank you for referring your patient, Supriya Herr, to the [facility] [place]. Please see a copy of my visit note below.    Chief Complaint   Patient presents with     RECHECK     diabetic foot exam                Allergies   Allergen Reactions     Penicillins Rash     Unasyn Rash     No evidence SJS, but very uncomfortable and precipitated multiple provider visits. Would not use penicillins again if other options available.          Subjective: Supriya is a 69 year old female who presents to the clinic today for a diabetic foot exam and management.  Her nails do get long.  Recently had a breakthrough case of COVID and was hospitalized.      Objective    Hemoglobin A1C   Date Value Ref Range Status   04/09/2021 6.2 (H) 0 - 5.6 % Final     Comment:     Normal <5.7% Prediabetes 5.7-6.4%  Diabetes 6.5% or higher - adopted from ADA   consensus guidelines.           Non-palpable DP and PT pulses BL.   Equinus noted BL. Pes planus with rigid toe deformities noted to lesser digits on the right. Left AKA noted.   Nails are thickened, discolored, elongated, with subungual debris consistent with onychomycosis.          Assessment: DM2 with left AKA and neuropathy - presenting for a diabetic foot exam.   Onychomycosis.   Tyloma     Plan:   - Pt seen and evaluated  - Nails debrided x 5.  - Tyloma debrided x 1  - Cont compression socks.  - Silicone toe sleeve to the 3rd digit.   - See again in 3 months.    Again, thank you for allowing me to participate in the care of your patient.        Sincerely,        Eleazar Pack, FRAN

## 2021-09-10 NOTE — NURSING NOTE
Dermatology Rooming Note    Supriya Herr's goals for this visit include:   Chief Complaint   Patient presents with     Derm Problem     supriya is coming in today for a follow up on her finger, states that things have been alot better, would also like to look at the psoriasis     Hilary Feng CMA on 9/10/2021 at 3:13 PM

## 2021-09-12 ENCOUNTER — HEALTH MAINTENANCE LETTER (OUTPATIENT)
Age: 71
End: 2021-09-12

## 2021-09-13 ENCOUNTER — TELEPHONE (OUTPATIENT)
Dept: FAMILY MEDICINE | Facility: CLINIC | Age: 71
End: 2021-09-13

## 2021-09-13 NOTE — TELEPHONE ENCOUNTER
Information faxed this morning for a hospital bed for Supriya,  Currently the request is denied but with additional information, they will reconsider.  Please fax to [facility] at [phone].  Need an addendum to the 7/23/21 note.  Home care sent a fax outlining what is needed.  Ivon Zambrano RN  [facility]

## 2021-09-14 NOTE — TELEPHONE ENCOUNTER
Visit note with addend was faxed to [facility]    Viridiana Sprague RN   [facility]

## 2021-09-14 NOTE — TELEPHONE ENCOUNTER
Dr Jackson    See message  Please update the V visit as needed 7/23/21    Fax update to [facility]    Viridiana Sprague RN   [facility]

## 2021-09-16 ENCOUNTER — TELEPHONE (OUTPATIENT)
Dept: FAMILY MEDICINE | Facility: CLINIC | Age: 71
End: 2021-09-16

## 2021-09-16 NOTE — TELEPHONE ENCOUNTER
Oleg, PT with [facility].    Verbal orders for PT given for the followinx/4wks to establish exercise plan post hospital for VAN.     Thanks,  Nelida, RN  [facility]

## 2021-09-20 ENCOUNTER — MEDICAL CORRESPONDENCE (OUTPATIENT)
Dept: HEALTH INFORMATION MANAGEMENT | Facility: CLINIC | Age: 71
End: 2021-09-20

## 2021-09-20 ENCOUNTER — TELEPHONE (OUTPATIENT)
Dept: FAMILY MEDICINE | Facility: CLINIC | Age: 71
End: 2021-09-20

## 2021-09-20 NOTE — TELEPHONE ENCOUNTER
Reason for Call:  Form, our goal is to have forms completed with 72 hours, however, some forms may require a visit or additional information.    Type of letter, form or note:  medical- add on discipline- date 9/15/21    Who is the form from?: Home care- [facility]     Where did the form come from: form was faxed in    What clinic location was the form placed at?: [facility]    Where the form was placed: Dr. Jackson's  Box/Folder    What number is listed as a contact on the form?:   PHONE: [phone]  FAX:[phone]    Additional comments: Please review, sign, date and fax back to the number listed above     Call taken on 9/20/2021 at 2:22 PM by Shahida Bowman

## 2021-09-21 DIAGNOSIS — M79.641 PAIN OF RIGHT HAND: ICD-10-CM

## 2021-09-22 ENCOUNTER — HOSPITAL ENCOUNTER (OUTPATIENT)
Dept: OCCUPATIONAL THERAPY | Facility: CLINIC | Age: 71
Setting detail: THERAPIES SERIES
End: 2021-09-22
Attending: FAMILY MEDICINE
Payer: COMMERCIAL

## 2021-09-22 DIAGNOSIS — Z99.3 WHEELCHAIR BOUND: ICD-10-CM

## 2021-09-22 DIAGNOSIS — L89.301: ICD-10-CM

## 2021-09-22 DIAGNOSIS — L89.302: ICD-10-CM

## 2021-09-22 PROCEDURE — 97542 WHEELCHAIR MNGMENT TRAINING: CPT | Mod: GO | Performed by: OCCUPATIONAL THERAPIST

## 2021-09-22 NOTE — PROGRESS NOTES
"   SEATING AND WHEELED MOBILITY ASSESSMENT  09/22/21 1100   Quick Adds   Quick Adds Certification;Current Manual Wheelchair   General Information    Rehab Discipline OT   Funding Medicare/[employer]   Service Outpatient;Occupational Therapy;Seating/Wheeled Mobility Evaluation   Height 5'9\"   Weight 160   Start Of Care Date 09/22/21   Referring Physician Noam Jackson   Orders Evaluate And Treat As Indicated;Per Therapist Evaluation   Orders Date 07/26/21   Others Present at Evaluation Daughter   Patient/Caregiver Goals Pressure Relief   Rehabilitation Technology Supplier Usha PRIEST from [facility]   Current Community Support Family/Friend Caregiver;Therapy Services  (24 hour care)   Patient role/Employment history Disabled   Fall Risk Screen   Fall screen completed by OT   Have you fallen 2 or more times in the past year? Yes   Have you fallen and had an injury in the past year? Yes   Is patient a fall risk? Yes;Department fall risk interventions implemented   Medical History   Onset Of Illness/injury Or Date Of Surgery 7/26/21  (order)   Medical Diagnosis L AKA   Medical History DMII, R Tib fracture, CKD, CHF, Dialysis,    Current Manual Wheelchair   [age] [birth year]    Quickie 2   Cushion Gel  (Matrx)   Wheelchair Back Upholstered   Footrest Style swing away not present   Settings Used Home;Outdoor Community Mobility;Primary Means of Transportation   Condition Fair   Current Equipment Requires Update   Rationale for Equipment Changes Arm rests are loose, brakes need repair and wounds on bottom from old cushion   Home Accessibility   Living Environment House   Primary Entrance Exposed Ramp   Community ADL   Transportation Car   Community Mobility Requirements Medical Appointments;Shopping   Cognitive/Visual/Hearing Status   Vision Corrective Lenses   ADL Status   Feeding Independent   Grooming/Hygiene Independent   Dressing Independent   Toileting Independent;Uses Equipment  (RTS)   Bathing Requires " "Assist;Uses Equipment  (cannot safely get into shower with standard chair)   Meal Preparation Requires Assist   Home Management Requires Assist   ADL Comments Requires safe bathing solution in order to get over 4\" lip into shower and have a safe transfer.   Balance   Unsupported Sitting Balance Uses Upper Extremities for Balance   Sitting Balance in Chair Uses Upper Extremities for Balance   Standing Balance Unable   Ambulation   Ambulation Non Ambulatory   Transfers   Transfer Assist Independent;Moderate Assist   Neuromuscular   History of Pressure Sores Yes   Current Pressure Sores Yes   Pressure Sore location Stage 1/2 coxxyc and Bilateral thights   Pain Yes   Pain Location L stump   Sensory Deficits Reported Bilateral neuropathies in hands   Education Assessment   Barriers to Learning Physical;Emotional;Cognitive   Preferred Learning Style Listening;Demonstration   Assessment/Plan   Criteria for Skilled Interventions Met Yes, Treatment Indicated   Treatment Diagnosis impaired participation in MRADLS   Planned Therapy Interventions Wheelchair Management/Propulsion Training   Planned Therapy Interventions Comments Trialed ROHO air cushion for optimal pressure relief with success.  Rigid insert needed for decreasing sway in upholstery.  SP shower chair needed to safely get into shower.    Risks and benefits of treatment have been explained Yes   Patient/family & other staff in agreement with plan of care Yes   Comments Shower chair trials TBD   Session Time   OT Wheelchair Management Minutes (81807) 55   Certification   Certification date from 09/22/21   Certification date to 09/22/21   Adult OT Eval Goals   OT Eval Goals (Adult) 1    OT Goal 1   Goal Identifier wheelchair   Goal Description Patient to demonstrate a successful home trial with the recommended equipment   Goal Progress To be completed   Target Date 09/22/21   Date Met 09/22/21     ROHO high profile seat cushion - this pressure distribution and " positioning seat cushion will optimally distribute seating pressures to prevent pressure ulcers, but also provide a stable base of support for fernando to use during MRADLs. Patients current gel/foam Matrx cushion is three years old and causing new wounds and not allowing old wound to heal.  Demo today in clinic successful, pain reports diminished with trial.    Rigid seat insert- solid board placed under cushion to even seat/cushion out due to sway of seat upholstery from use causing hips to rotate inward.    2 post, flip back, height adjustable, removable, full length armrests - needed for arm support at appropriate height and to allow them to be moved out of the way for safe transfers, not available with standard armrests.  Current arm rests are worn and not stable from daily heavy duty use.    This equipment is reasonable and necessary with reference to accepted standards of medical practice and treatment of this patient's condition and is not being recommended as a convenience item. Without this recommended equipment, she is highly likely to sustain injuries from falls, develop pressure sores or postural compensation, and/or be bed confined, which those costs far exceed the cost of the requested equipment.    Electronically signed by:  Brenna GONZALEZ, ATP      Occupational Therapist, Assistive   [phone]      fax: [phone]      [email or web address]  Seating [facility]- [facility], [place] [address]  September 22, 2021    I have read and concur with the above recommendations.    Physician Printed Name __________________________________________    Physician SIgnature  _____________________________________________    Date of SIgnature ______________________________    Physician Phone  ______________________________

## 2021-09-29 NOTE — TELEPHONE ENCOUNTER
Forms have been completed and faxed back to [facility] @ [phone] , also placed into abstraction to scan into patients chart.        Pao Elizondo MA

## 2021-09-30 ENCOUNTER — TELEPHONE (OUTPATIENT)
Dept: FAMILY MEDICINE | Facility: CLINIC | Age: 71
End: 2021-09-30

## 2021-09-30 ENCOUNTER — TELEPHONE (OUTPATIENT)
Dept: TRANSPLANT | Facility: CLINIC | Age: 71
End: 2021-09-30

## 2021-09-30 DIAGNOSIS — N18.6 ESRD (END STAGE RENAL DISEASE) (H): ICD-10-CM

## 2021-09-30 DIAGNOSIS — Z76.82 ORGAN TRANSPLANT CANDIDATE: ICD-10-CM

## 2021-09-30 NOTE — TELEPHONE ENCOUNTER
Called and spoke with daughter/pt about wounds, Dr Herrera states that wounds are not open and are doing okay and shouldn't preclude her from transplant. Will get Supriya in for transplant surgeon appt and frailty testing prior to other necessary appts to approve candidacy. Schedulers will get a hold of pt to set up.  Pt verbalized understanding of information and has no further questions. Encouraged to reach out if questions arise.

## 2021-09-30 NOTE — TELEPHONE ENCOUNTER
Brenden from [employer] calling to check on status of order for alternating pressure low air loss mattress. This was recommend by OT. No current request that I can see in epic. Brenden will fax over OT recommendations (placed in provider folder) and recommends going through [facility] medical.   Call with any questions.

## 2021-10-01 ENCOUNTER — TELEPHONE (OUTPATIENT)
Dept: FAMILY MEDICINE | Facility: CLINIC | Age: 71
End: 2021-10-01

## 2021-10-01 DIAGNOSIS — L89.151 PRESSURE ULCER OF SACRAL REGION, STAGE 1: Primary | ICD-10-CM

## 2021-10-01 NOTE — TELEPHONE ENCOUNTER
Spoke with Lynette, stated that she gave info to Shahida on where to fax DME. [facility] fax .     DME faxed to above number by RN.     Thanks,  SHANTA Krause  [facility]

## 2021-10-01 NOTE — TELEPHONE ENCOUNTER
Reason for Call:  Form, our goal is to have forms completed with 72 hours, however, some forms may require a visit or additional information.    Type of letter, form or note:  medical-Medical Equipment althernative mattresss request.     Who is the form from?: [employer] (if other please explain)    Where did the form come from: form was faxed in    What clinic location was the form placed at?: [facility]    Where the form was placed: Dr. Jackson's  Box/Folder    What number is listed as a contact on the form?:   PHONE: [phone]       Additional comments: Please review.     Call taken on 10/1/2021 at 10:01 AM by Shahida Bowman

## 2021-10-01 NOTE — TELEPHONE ENCOUNTER
Lynette with [facility] verbal ok for orders.       
Reason for Call:  Home Health Care    Lynette with [facility] called regarding (reason for call): verbal order    Orders are needed for this patient. Yes    PT: N/A    OT: 1 Time a week for 4 weeks for DME Needs and ADL Transfer and Safety    Skilled Nursing: N/A    Pt Provider: Renetta    Phone Number Homecare Nurse can be reached at: [phone]    Can we leave a detailed message on this number? YES    Phone number patient can be reached at: Other phone number:  N/A*    Best Time: Today    Call taken on 10/1/2021 at 3:40 PM by Caridad Baxter      
0

## 2021-10-01 NOTE — TELEPHONE ENCOUNTER
Dr. Jackson,    No immediate action needed, mostly fyi to keep look out for recommendation for order.     Thanks,  SHANTA Krause  [facility]

## 2021-10-05 ENCOUNTER — TELEPHONE (OUTPATIENT)
Dept: FAMILY MEDICINE | Facility: CLINIC | Age: 71
End: 2021-10-05

## 2021-10-05 NOTE — TELEPHONE ENCOUNTER
Home care PT calling requesting verbal orders:    PT: 1/week for 3 weeks, work on arm exercises and transfers    Verbal OK given.    Kamryn Bloom RN  [facility]

## 2021-10-07 DIAGNOSIS — M79.641 PAIN OF RIGHT HAND: ICD-10-CM

## 2021-10-07 RX ORDER — GABAPENTIN 100 MG/1
200 CAPSULE ORAL 3 TIMES DAILY PRN
Qty: 180 CAPSULE | Refills: 11 | Status: SHIPPED | OUTPATIENT
Start: 2021-10-07 | End: 2021-10-28

## 2021-10-09 NOTE — TELEPHONE ENCOUNTER
Daughter was notified on insulin dosing  for Colonoscopy .   
I spoke with  Her daughter who tells me her colonoscopy 4/26/18  At 11:30 AM  She is questioning what she should do with her  Medications / BS monitoring for the night before and day of.  The daughter wanted to schedule  F/u when  Here for other appointments but you are not in that day. She will schedule f/u when she can get off work to bring her  or someone else can  bring her but it may not be until May.    
[facility]    Phone Message    May a detailed message be left on voicemail: yes    Reason for Call: Other: is she past due for a follow up with caio chapman, and what can she  drink, eat prior to the colonscopy, does she need to fast, need a change in insulin amount, is this appointment to early,. pt's daughter would like to speak to somebody as soon as possible . Please call the daughter at [phone] do not call the pt.  Action Taken: Message routed to:  [facility] ed  
Damon De La Garza(Attending)

## 2021-10-14 ENCOUNTER — TELEPHONE (OUTPATIENT)
Dept: FAMILY MEDICINE | Facility: CLINIC | Age: 71
End: 2021-10-14

## 2021-10-14 NOTE — TELEPHONE ENCOUNTER
Forms completed and faxed back to [facility] @ [phone]. Placed into Abstraction to scan into patients chart.   Hernán Goyal MA

## 2021-10-19 ENCOUNTER — MEDICAL CORRESPONDENCE (OUTPATIENT)
Dept: HEALTH INFORMATION MANAGEMENT | Facility: CLINIC | Age: 71
End: 2021-10-19

## 2021-10-19 ENCOUNTER — TELEPHONE (OUTPATIENT)
Dept: FAMILY MEDICINE | Facility: CLINIC | Age: 71
End: 2021-10-19

## 2021-10-19 DIAGNOSIS — Z99.2 ESRD (END STAGE RENAL DISEASE) ON DIALYSIS (H): ICD-10-CM

## 2021-10-19 DIAGNOSIS — Z99.3 WHEELCHAIR BOUND: Primary | ICD-10-CM

## 2021-10-19 DIAGNOSIS — N18.6 ESRD (END STAGE RENAL DISEASE) ON DIALYSIS (H): ICD-10-CM

## 2021-10-19 NOTE — TELEPHONE ENCOUNTER
"Routing to provider - Renetta - please review and advise as appropriate    Naty OT - [facility] - [phone]    DME Order:  Hospital Bed    Request:  Office visit note 7/23/21 supports need for hospital bed - OT is requesting addendum of office visit note to support order - faxing guidelines of what needs to be included in the prescription for hospital bed and addendum OT is requesting - also order must include things like height, weight, dx, type of bed - see notes    Placed fax on provider desk for review    Ht Readings from Last 2 Encounters:   08/16/21 1.753 m (5' 9\")   07/30/21 1.676 m (5' 6\")     Wt Readings from Last 2 Encounters:   08/24/21 72.8 kg (160 lb 7.9 oz)   07/30/21 116 kg (255 lb 11.7 oz)       Please fax DME Order - Hospital Bed and office visit notes to [facility] - Fax #: [phone]      "

## 2021-10-19 NOTE — PROGRESS NOTES
Outcome for 10/19/21 2:18 PM :Glucose data obtained via Phone and Located in Table Below       10/11    10/12  , 362  10/13 AM 92   10/14 AM 81   10/15 AM 85 Lunch 146   10/16    10/17  Lunch 143   10/18  Lunch 292  (Went to )  10/19

## 2021-10-19 NOTE — TELEPHONE ENCOUNTER
Order and visit note faxed to [facility] - Fax #: [phone]    Viridiana Sprague RN   [facility]

## 2021-10-20 ENCOUNTER — TELEPHONE (OUTPATIENT)
Dept: ENDOCRINOLOGY | Facility: CLINIC | Age: 71
End: 2021-10-20

## 2021-10-20 ENCOUNTER — VIRTUAL VISIT (OUTPATIENT)
Dept: ENDOCRINOLOGY | Facility: CLINIC | Age: 71
End: 2021-10-20
Payer: MEDICARE

## 2021-10-20 DIAGNOSIS — E11.65 TYPE 2 DIABETES MELLITUS WITH HYPERGLYCEMIA, WITH LONG-TERM CURRENT USE OF INSULIN (H): ICD-10-CM

## 2021-10-20 DIAGNOSIS — E78.00 HYPERCHOLESTEROLEMIA: ICD-10-CM

## 2021-10-20 DIAGNOSIS — Z79.4 TYPE 2 DIABETES MELLITUS WITH HYPERGLYCEMIA, WITH LONG-TERM CURRENT USE OF INSULIN (H): ICD-10-CM

## 2021-10-20 PROCEDURE — 99215 OFFICE O/P EST HI 40 MIN: CPT | Mod: 95 | Performed by: PHYSICIAN ASSISTANT

## 2021-10-20 RX ORDER — GLUCAGON 3 MG/1
3 POWDER NASAL SEE ADMIN INSTRUCTIONS
Qty: 1 EACH | Refills: 3 | Status: SHIPPED | OUTPATIENT
Start: 2021-10-20

## 2021-10-20 RX ORDER — INSULIN ASPART 100 [IU]/ML
INJECTION, SOLUTION INTRAVENOUS; SUBCUTANEOUS
Qty: 15 ML | Refills: 3 | COMMUNITY
Start: 2021-10-20 | End: 2023-02-13

## 2021-10-20 NOTE — TELEPHONE ENCOUNTER
Provider notified.   Suzanne Valentine, RN on 10/21/2021 at 12:16 PM       RE    Baqsimi is not covered. Insurance will cover Gvoke. Would you like to change to start PA?

## 2021-10-20 NOTE — PROGRESS NOTES
Supriya is a 71 year old who is being evaluated via a billable video visit.      How would you like to obtain your AVS? [employer]  If the video visit is dropped, the invitation should be resent by: Text to cell phone: [phone]  Will anyone else be joining your video visit? Melina Hoff MA    We appreciate your assistance in coordinating your healthcare.     Please upload your insulin pump, blood sugar meter and/or continuous glucose monitor at home 1-2 days before your next diabetes-related appointment.   This will allow your provider to review your  data before your scheduled virtual visit.    To ask a question to your Endocrine care team, please send them a [employer] message, or reach them by phone at [phone]     To expedite your medication refill(s), please contact your pharmacy and have them   fax a refill request to: [phone].  *Please allow 3 business days for routine medication refills.  *Please allow 5 business days for controlled substance medication refills.    For after-hours urgent Endocrine issues, that do not require 911, please dial [phone], and ask to speak with the Endocrinologist On-Call
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
## 2021-10-20 NOTE — TELEPHONE ENCOUNTER
"Requested Prescriptions   Pending Prescriptions Disp Refills     atorvastatin (LIPITOR) 20 MG tablet [Pharmacy Med Name: ATORVASTATIN 20 MG TABLET] 90 tablet 0     Sig: TAKE 1 TABLET BY MOUTH EVERY DAY IN THE EVENING       Statins Protocol Failed - 10/20/2021 12:12 AM        Failed - LDL on file in past 12 months     Recent Labs   Lab Test 01/17/20  1204   LDL 74             Passed - No abnormal creatine kinase in past 12 months     Recent Labs   Lab Test 11/05/17  0903   CKT 18*                Passed - Recent (12 mo) or future (30 days) visit within the authorizing provider's specialty     Patient has had an office visit with the authorizing provider or a provider within the authorizing providers department within the previous 12 mos or has a future within next 30 days. See \"Patient Info\" tab in inbasket, or \"Choose Columns\" in Meds & Orders section of the refill encounter.              Passed - Medication is active on med list        Passed - Patient is age 18 or older        Passed - No active pregnancy on record        Passed - No positive pregnancy test in past 12 months           Routing refill request to provider for review/approval because:  Labs not current:  LDL    Nelida Gonzalez RN  [facility]           "
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
## 2021-10-21 ENCOUNTER — TELEPHONE (OUTPATIENT)
Dept: ENDOCRINOLOGY | Facility: CLINIC | Age: 71
End: 2021-10-21

## 2021-10-21 RX ORDER — ATORVASTATIN CALCIUM 20 MG/1
TABLET, FILM COATED ORAL
Qty: 90 TABLET | Refills: 0 | Status: SHIPPED | OUTPATIENT
Start: 2021-10-21 | End: 2022-03-07

## 2021-10-21 NOTE — TELEPHONE ENCOUNTER
Central Prior Authorization Team   Phone: [phone]      PA Initiation    Medication:   Insurance Company: [employer] - Phone [phone] Fax [phone]  Pharmacy Filling the Rx: [id number] [place] - [address]  Filling Pharmacy Phone: [phone]  Filling Pharmacy Fax:    Start Date: 10/21/2021

## 2021-10-21 NOTE — TELEPHONE ENCOUNTER
Prior Authorization Specialty Medication Request    Medication/Dose:   Glucagon (BAQSIMI ONE PACK) 3 MG/DOSE POWD 1 each 3 10/20/2021  --   Sig - Route: Spray 3 mg in nostril See Admin Instructions USE ONLY FOR SEVERE HYPOGLYCEMIA. - Nasal       ICD code (if different than what is on RX):    Previously Tried and Failed:      Important Lab Values:   Rationale:     Insurance Name:   Insurance ID:   Insurance Phone Number:     Pharmacy Information (if different than what is on RX)  Name:    Phone:

## 2021-10-22 ENCOUNTER — TELEPHONE (OUTPATIENT)
Dept: FAMILY MEDICINE | Facility: CLINIC | Age: 71
End: 2021-10-22

## 2021-10-22 NOTE — TELEPHONE ENCOUNTER
Prior Authorization Approval    Authorization Effective Date: 7/23/2021  Authorization Expiration Date: 10/21/2022  Medication: Glucagon (BAQSIMI ONE PACK) 3 MG/DOSE POWD-PA approved  Approved Dose/Quantity:   Reference #:     Insurance Company: [employer] - Phone [phone] Fax [phone]  Expected CoPay:       CoPay Card Available:      Foundation Assistance Needed:    Which Pharmacy is filling the prescription (Not needed for infusion/clinic administered): [id number] - [place], [place] - [address]  Pharmacy Notified: Yes  Patient Notified: No-pharmacy will contact

## 2021-10-22 NOTE — TELEPHONE ENCOUNTER
Reason for Call: Other    Detailed comments: [facility] called stating they ended OT 1 visit earlier than planned so doesn't need the visit next week and if this is okay. Please follow up. Thanks!    Phone Number Patient can be reached at: [phone] Time: Any    Can we leave a detailed message on this number? YES    Call taken on 10/22/2021 at 2:34 PM by Marjan Porras

## 2021-10-23 ENCOUNTER — TELEPHONE (OUTPATIENT)
Dept: NURSING | Facility: CLINIC | Age: 71
End: 2021-10-23

## 2021-10-25 ENCOUNTER — MEDICAL CORRESPONDENCE (OUTPATIENT)
Dept: HEALTH INFORMATION MANAGEMENT | Facility: CLINIC | Age: 71
End: 2021-10-25
Payer: MEDICARE

## 2021-10-25 NOTE — TELEPHONE ENCOUNTER
Received a request from [facility] for clinic notes and they have been faxed 10/25/21

## 2021-10-26 ENCOUNTER — MEDICAL CORRESPONDENCE (OUTPATIENT)
Dept: HEALTH INFORMATION MANAGEMENT | Facility: CLINIC | Age: 71
End: 2021-10-26
Payer: MEDICARE

## 2021-10-28 ENCOUNTER — MEDICAL CORRESPONDENCE (OUTPATIENT)
Dept: HEALTH INFORMATION MANAGEMENT | Facility: CLINIC | Age: 71
End: 2021-10-28
Payer: MEDICARE

## 2021-10-28 ENCOUNTER — TELEPHONE (OUTPATIENT)
Dept: FAMILY MEDICINE | Facility: CLINIC | Age: 71
End: 2021-10-28

## 2021-10-28 DIAGNOSIS — M79.641 PAIN OF RIGHT HAND: ICD-10-CM

## 2021-10-28 RX ORDER — GABAPENTIN 100 MG/1
100 CAPSULE ORAL 3 TIMES DAILY PRN
Qty: 90 CAPSULE | Refills: 11 | Status: SHIPPED | OUTPATIENT
Start: 2021-10-28 | End: 2021-12-09

## 2021-10-28 NOTE — TELEPHONE ENCOUNTER
Reason for Call:  Home Health Care    Lynnette with [facility] called regarding  orders    Orders are needed for this patient. Skilled        Skilled Nursinx per week for 4 weeks      Phone Number Homecare Nurse can be reached at: [phone]    Can we leave a detailed message on this number? YES    Phone number patient can be reached at: Home number on file [phone] (home)    Best Time: any    Call taken on 10/28/2021 at 12:29 PM by Jalyn Santacruz

## 2021-11-03 ENCOUNTER — TELEPHONE (OUTPATIENT)
Dept: FAMILY MEDICINE | Facility: CLINIC | Age: 71
End: 2021-11-03
Payer: MEDICARE

## 2021-11-03 NOTE — TELEPHONE ENCOUNTER
Reason for Call:  Form, our goal is to have forms completed with 72 hours, however, some forms may require a visit or additional information.    Type of letter, form or note:  Home Health Certification HOME HEALTH CERTIFICATION AND PLAN OF CARE 10/28/21-12/26/21    Who is the form from?: Home care    Where did the form come from: form was faxed in    What clinic location was the form placed at?: [facility]    Where the form was placed: 'S Box/Folder    What number is listed as a contact on the form?:   [phone]       Additional comments: Please review, sign, date and fax back to [facility] fax number listed above. Thank you     Call taken on 11/3/2021 at 2:21 PM by Kat Corrales

## 2021-11-04 DIAGNOSIS — N18.5 ANEMIA DUE TO STAGE 5 CHRONIC KIDNEY DISEASE, NOT ON CHRONIC DIALYSIS (H): ICD-10-CM

## 2021-11-04 DIAGNOSIS — F41.1 GAD (GENERALIZED ANXIETY DISORDER): ICD-10-CM

## 2021-11-04 DIAGNOSIS — D63.1 ANEMIA DUE TO STAGE 5 CHRONIC KIDNEY DISEASE, NOT ON CHRONIC DIALYSIS (H): ICD-10-CM

## 2021-11-04 DIAGNOSIS — Z53.9 DIAGNOSIS NOT YET DEFINED: Primary | ICD-10-CM

## 2021-11-04 DIAGNOSIS — N18.5 CKD (CHRONIC KIDNEY DISEASE) STAGE 5, GFR LESS THAN 15 ML/MIN (H): Primary | ICD-10-CM

## 2021-11-04 PROCEDURE — G0179 MD RECERTIFICATION HHA PT: HCPCS | Performed by: FAMILY MEDICINE

## 2021-11-05 RX ORDER — SEVELAMER CARBONATE 800 MG/1
800 TABLET, FILM COATED ORAL
Qty: 90 TABLET | Refills: 3 | Status: SHIPPED | OUTPATIENT
Start: 2021-11-05 | End: 2022-03-07

## 2021-11-05 RX ORDER — FUROSEMIDE 80 MG
80 TABLET ORAL 2 TIMES DAILY
Qty: 180 TABLET | Refills: 3 | Status: SHIPPED | OUTPATIENT
Start: 2021-11-05 | End: 2022-06-27

## 2021-11-07 ENCOUNTER — HEALTH MAINTENANCE LETTER (OUTPATIENT)
Age: 71
End: 2021-11-07

## 2021-11-08 DIAGNOSIS — E11.22 TYPE 2 DIABETES MELLITUS WITH DIABETIC CHRONIC KIDNEY DISEASE, UNSPECIFIED CKD STAGE, UNSPECIFIED WHETHER LONG TERM INSULIN USE (H): ICD-10-CM

## 2021-11-08 NOTE — TELEPHONE ENCOUNTER
Reason for Call:  Medication or medication refill:    Do you use a [facility]?  Name of the pharmacy and phone number for the current request:  [id number] - [place], [place] - [address]    Name of the medication requested: blood glucose (ONE TOUCH DELICA) lancing device    Other request: PT IS REQUESTING FOR NEW RX: FOR THIS DEVICE.     Can we leave a detailed message on this number? YES    Phone number patient can be reached at: Home number on file [phone] (home)    Best Time: ANY    Call taken on 11/8/2021 at 5:39 PM by Hellen Bermeo

## 2021-11-09 RX ORDER — LANCING DEVICE/LANCETS
KIT MISCELLANEOUS
Qty: 1 EACH | Refills: 1 | Status: SHIPPED | OUTPATIENT
Start: 2021-11-09

## 2021-11-09 NOTE — TELEPHONE ENCOUNTER
Requested Prescriptions   Pending Prescriptions Disp Refills     blood glucose (ONE TOUCH DELICA) lancing device 1 each 1     Sig: Device to be used with lancets.needs lancets device for delica lancets       There is no refill protocol information for this order        Routing refill request to provider for review/approval because:  Drug not on the G refill protocol

## 2021-11-11 NOTE — TELEPHONE ENCOUNTER
FORMS COMPLETED ON 11/04/21.    Hellen Bermeo, Patient Registration     [facility]

## 2021-11-15 DIAGNOSIS — E78.00 HYPERCHOLESTEROLEMIA: ICD-10-CM

## 2021-11-16 DIAGNOSIS — N18.5 CKD (CHRONIC KIDNEY DISEASE) STAGE 5, GFR LESS THAN 15 ML/MIN (H): ICD-10-CM

## 2021-11-17 RX ORDER — ATORVASTATIN CALCIUM 20 MG/1
TABLET, FILM COATED ORAL
Qty: 0.1 TABLET | Refills: 0 | OUTPATIENT
Start: 2021-11-17

## 2021-11-17 NOTE — TELEPHONE ENCOUNTER
"Requested Prescriptions   Pending Prescriptions Disp Refills     atorvastatin (LIPITOR) 20 MG tablet [Pharmacy Med Name: ATORVASTATIN 20 MG TABLET] 90 tablet 0     Sig: TAKE 1 TABLET BY MOUTH EVERY DAY IN THE EVENING       Statins Protocol Failed - 11/15/2021  2:06 PM        Failed - LDL on file in past 12 months     Recent Labs   Lab Test 01/17/20  1204   LDL 74             Passed - No abnormal creatine kinase in past 12 months     Recent Labs   Lab Test 11/05/17  0903   CKT 18*                Passed - Recent (12 mo) or future (30 days) visit within the authorizing provider's specialty     Patient has had an office visit with the authorizing provider or a provider within the authorizing providers department within the previous 12 mos or has a future within next 30 days. See \"Patient Info\" tab in inbasket, or \"Choose Columns\" in Meds & Orders section of the refill encounter.              Passed - Medication is active on med list        Passed - Patient is age 18 or older        Passed - No active pregnancy on record        Passed - No positive pregnancy test in past 12 months           Routing refill request to provider for review/approval because:  LDL    Refused Prescriptions:                       Disp   Refills    atorvastatin (LIPITOR) 20 MG tablet [Pharm*0.1 ta*0        Sig: TAKE 1 TABLET BY MOUTH EVERY DAY IN THE EVENING  Refused By: SARAH TURNER  Reason for Refusal: Duplicate        "

## 2021-11-18 ENCOUNTER — TELEPHONE (OUTPATIENT)
Dept: FAMILY MEDICINE | Facility: CLINIC | Age: 71
End: 2021-11-18
Payer: MEDICARE

## 2021-11-18 DIAGNOSIS — M62.81 GENERALIZED MUSCLE WEAKNESS: Primary | ICD-10-CM

## 2021-11-18 NOTE — TELEPHONE ENCOUNTER
Attempted to call, left DVM on confidential line.     Thanks,  SHANTA Krause  [facility]

## 2021-11-18 NOTE — TELEPHONE ENCOUNTER
Patient Call: Caroline Gray,  daughter # [phone] regarding upcoming procedure Fistula placement 03/26/2020 with Dr. Martin    Reason for call: Caroline Gray has question about the proedure  Call back needed? Yes    Return Call Needed  Same as documented in contacts section  When to return call?: Same day: Route High Priority     declines

## 2021-11-18 NOTE — TELEPHONE ENCOUNTER
Routing to provider - Renetta - please review and advise as appropriate    1. Referral request:  Physical therapy -     Reason:  Change in mobility - unable to transfer with slider board, bed mobility issues, transfers with daughter are becoming more difficult -  May call back care coordinator or patient    Alfreda - care coordinator - [employer] - Nurse - [phone]          Writer assisted with additional requests below    2. Requesting due date for next colonoscopy  - previous colonoscopy 6/13/18 - See colonoscopy report.     Please arrange for repeat colonoscopy in 5 years in GI lab with IV sedation. Order placed.     LACEY Morel MD  Associate Professor of Medicine  Division of Gastroenterology, Hepatology and Nutrition  [facility]    Due Date: 6/13/23      3. Discussed DME order hospital bed and location order was sent [facility]

## 2021-11-19 ENCOUNTER — TELEPHONE (OUTPATIENT)
Dept: FAMILY MEDICINE | Facility: CLINIC | Age: 71
End: 2021-11-19
Payer: MEDICARE

## 2021-11-19 NOTE — TELEPHONE ENCOUNTER
[employer] insurance company calling (pt's CC)  [facility] still needs notes supporting hospital bed from October    Told her initial visit where this was ordered was July, not October   She asked that July note be faxed to her and [facility]     Alfreda - [phone] (phone and fax #)  [facility] fax: [phone]    Faxed to both above #s  She will call clinic back if issues    Ct SIMPSON RN

## 2021-11-22 ENCOUNTER — HOSPITAL ENCOUNTER (EMERGENCY)
Facility: CLINIC | Age: 71
Discharge: HOME | DRG: 193 | End: 2021-11-23
Attending: EMERGENCY MEDICINE | Admitting: EMERGENCY MEDICINE
Payer: MEDICARE

## 2021-11-22 ENCOUNTER — TELEPHONE (OUTPATIENT)
Dept: FAMILY MEDICINE | Facility: CLINIC | Age: 71
End: 2021-11-22
Payer: MEDICARE

## 2021-11-22 ENCOUNTER — APPOINTMENT (OUTPATIENT)
Dept: GENERAL RADIOLOGY | Facility: CLINIC | Age: 71
DRG: 193 | End: 2021-11-22
Attending: EMERGENCY MEDICINE
Payer: MEDICARE

## 2021-11-22 VITALS
TEMPERATURE: 98.1 F | OXYGEN SATURATION: 96 % | RESPIRATION RATE: 18 BRPM | WEIGHT: 208 LBS | DIASTOLIC BLOOD PRESSURE: 64 MMHG | HEIGHT: 68 IN | HEART RATE: 72 BPM | BODY MASS INDEX: 31.52 KG/M2 | SYSTOLIC BLOOD PRESSURE: 174 MMHG

## 2021-11-22 DIAGNOSIS — E87.5 HYPERKALEMIA: ICD-10-CM

## 2021-11-22 DIAGNOSIS — N18.6 ESRD (END STAGE RENAL DISEASE) ON DIALYSIS (H): ICD-10-CM

## 2021-11-22 DIAGNOSIS — R05.8 SPUTUM PRODUCTION: ICD-10-CM

## 2021-11-22 DIAGNOSIS — Z99.2 ESRD (END STAGE RENAL DISEASE) ON DIALYSIS (H): ICD-10-CM

## 2021-11-22 DIAGNOSIS — J18.9 PNEUMONIA OF RIGHT LOWER LOBE DUE TO INFECTIOUS ORGANISM: Primary | ICD-10-CM

## 2021-11-22 DIAGNOSIS — R05.8 PRODUCTIVE COUGH: ICD-10-CM

## 2021-11-22 LAB
ALBUMIN SERPL-MCNC: 3.1 G/DL (ref 3.4–5)
ALP SERPL-CCNC: 121 U/L (ref 40–150)
ALT SERPL W P-5'-P-CCNC: 20 U/L (ref 0–50)
ANION GAP SERPL CALCULATED.3IONS-SCNC: 7 MMOL/L (ref 3–14)
AST SERPL W P-5'-P-CCNC: 15 U/L (ref 0–45)
BASOPHILS # BLD AUTO: 0.1 10E3/UL (ref 0–0.2)
BASOPHILS NFR BLD AUTO: 1 %
BILIRUB SERPL-MCNC: 0.3 MG/DL (ref 0.2–1.3)
BUN SERPL-MCNC: 64 MG/DL (ref 7–30)
CALCIUM SERPL-MCNC: 9.3 MG/DL (ref 8.5–10.1)
CHLORIDE BLD-SCNC: 110 MMOL/L (ref 94–109)
CO2 SERPL-SCNC: 24 MMOL/L (ref 20–32)
CREAT SERPL-MCNC: 5.82 MG/DL (ref 0.52–1.04)
EOSINOPHIL # BLD AUTO: 0.2 10E3/UL (ref 0–0.7)
EOSINOPHIL NFR BLD AUTO: 2 %
ERYTHROCYTE [DISTWIDTH] IN BLOOD BY AUTOMATED COUNT: 12.6 % (ref 10–15)
FLUAV RNA SPEC QL NAA+PROBE: NEGATIVE
FLUBV RNA RESP QL NAA+PROBE: NEGATIVE
GFR SERPL CREATININE-BSD FRML MDRD: 7 ML/MIN/1.73M2
GLUCOSE BLD-MCNC: 88 MG/DL (ref 70–99)
GLUCOSE BLDC GLUCOMTR-MCNC: 159 MG/DL (ref 70–99)
HCT VFR BLD AUTO: 32.8 % (ref 35–47)
HGB BLD-MCNC: 10.2 G/DL (ref 11.7–15.7)
IMM GRANULOCYTES # BLD: 0 10E3/UL
IMM GRANULOCYTES NFR BLD: 0 %
LYMPHOCYTES # BLD AUTO: 1.6 10E3/UL (ref 0.8–5.3)
LYMPHOCYTES NFR BLD AUTO: 15 %
MCH RBC QN AUTO: 32.6 PG (ref 26.5–33)
MCHC RBC AUTO-ENTMCNC: 31.1 G/DL (ref 31.5–36.5)
MCV RBC AUTO: 105 FL (ref 78–100)
MONOCYTES # BLD AUTO: 0.8 10E3/UL (ref 0–1.3)
MONOCYTES NFR BLD AUTO: 8 %
NEUTROPHILS # BLD AUTO: 8.2 10E3/UL (ref 1.6–8.3)
NEUTROPHILS NFR BLD AUTO: 74 %
NRBC # BLD AUTO: 0 10E3/UL
NRBC BLD AUTO-RTO: 0 /100
PLATELET # BLD AUTO: 181 10E3/UL (ref 150–450)
POTASSIUM BLD-SCNC: 5.5 MMOL/L (ref 3.4–5.3)
POTASSIUM BLD-SCNC: 6.4 MMOL/L (ref 3.4–5.3)
PROT SERPL-MCNC: 7.1 G/DL (ref 6.8–8.8)
RBC # BLD AUTO: 3.13 10E6/UL (ref 3.8–5.2)
SARS-COV-2 RNA RESP QL NAA+PROBE: NEGATIVE
SODIUM SERPL-SCNC: 141 MMOL/L (ref 133–144)
WBC # BLD AUTO: 10.9 10E3/UL (ref 4–11)

## 2021-11-22 PROCEDURE — 250N000011 HC RX IP 250 OP 636: Performed by: EMERGENCY MEDICINE

## 2021-11-22 PROCEDURE — 250N000012 HC RX MED GY IP 250 OP 636 PS 637: Performed by: EMERGENCY MEDICINE

## 2021-11-22 PROCEDURE — 96375 TX/PRO/DX INJ NEW DRUG ADDON: CPT

## 2021-11-22 PROCEDURE — 93005 ELECTROCARDIOGRAM TRACING: CPT

## 2021-11-22 PROCEDURE — 85025 COMPLETE CBC W/AUTO DIFF WBC: CPT | Performed by: EMERGENCY MEDICINE

## 2021-11-22 PROCEDURE — 258N000003 HC RX IP 258 OP 636: Performed by: EMERGENCY MEDICINE

## 2021-11-22 PROCEDURE — 36415 COLL VENOUS BLD VENIPUNCTURE: CPT | Performed by: EMERGENCY MEDICINE

## 2021-11-22 PROCEDURE — 250N000009 HC RX 250: Performed by: EMERGENCY MEDICINE

## 2021-11-22 PROCEDURE — 82040 ASSAY OF SERUM ALBUMIN: CPT | Performed by: EMERGENCY MEDICINE

## 2021-11-22 PROCEDURE — 96365 THER/PROPH/DIAG IV INF INIT: CPT

## 2021-11-22 PROCEDURE — 96367 TX/PROPH/DG ADDL SEQ IV INF: CPT

## 2021-11-22 PROCEDURE — 250N000013 HC RX MED GY IP 250 OP 250 PS 637: Performed by: EMERGENCY MEDICINE

## 2021-11-22 PROCEDURE — C9803 HOPD COVID-19 SPEC COLLECT: HCPCS

## 2021-11-22 PROCEDURE — 258N000001 HC RX 258: Performed by: EMERGENCY MEDICINE

## 2021-11-22 PROCEDURE — 94640 AIRWAY INHALATION TREATMENT: CPT

## 2021-11-22 PROCEDURE — 87636 SARSCOV2 & INF A&B AMP PRB: CPT | Performed by: EMERGENCY MEDICINE

## 2021-11-22 PROCEDURE — 84132 ASSAY OF SERUM POTASSIUM: CPT | Performed by: EMERGENCY MEDICINE

## 2021-11-22 PROCEDURE — 71046 X-RAY EXAM CHEST 2 VIEWS: CPT

## 2021-11-22 PROCEDURE — 96366 THER/PROPH/DIAG IV INF ADDON: CPT

## 2021-11-22 PROCEDURE — 99285 EMERGENCY DEPT VISIT HI MDM: CPT | Mod: 25

## 2021-11-22 RX ORDER — CEFPODOXIME PROXETIL 200 MG/1
200 TABLET, FILM COATED ORAL DAILY
Qty: 7 TABLET | Refills: 0 | Status: ON HOLD | OUTPATIENT
Start: 2021-11-22 | End: 2021-11-23

## 2021-11-22 RX ORDER — DEXTROSE MONOHYDRATE 25 G/50ML
50 INJECTION, SOLUTION INTRAVENOUS ONCE
Status: COMPLETED | OUTPATIENT
Start: 2021-11-22 | End: 2021-11-22

## 2021-11-22 RX ORDER — CEFTRIAXONE 2 G/1
2 INJECTION, POWDER, FOR SOLUTION INTRAMUSCULAR; INTRAVENOUS ONCE
Status: DISCONTINUED | OUTPATIENT
Start: 2021-11-22 | End: 2021-11-23 | Stop reason: HOSPADM

## 2021-11-22 RX ORDER — DEXTROSE MONOHYDRATE 100 MG/ML
INJECTION, SOLUTION INTRAVENOUS CONTINUOUS
Status: DISCONTINUED | OUTPATIENT
Start: 2021-11-22 | End: 2021-11-23 | Stop reason: HOSPADM

## 2021-11-22 RX ORDER — AZITHROMYCIN 500 MG/1
500 INJECTION, POWDER, LYOPHILIZED, FOR SOLUTION INTRAVENOUS ONCE
Status: COMPLETED | OUTPATIENT
Start: 2021-11-22 | End: 2021-11-22

## 2021-11-22 RX ORDER — CALCIUM GLUCONATE 20 MG/ML
2 INJECTION, SOLUTION INTRAVENOUS ONCE
Status: COMPLETED | OUTPATIENT
Start: 2021-11-22 | End: 2021-11-23

## 2021-11-22 RX ORDER — ALBUTEROL SULFATE 5 MG/ML
5 SOLUTION RESPIRATORY (INHALATION) ONCE
Status: COMPLETED | OUTPATIENT
Start: 2021-11-22 | End: 2021-11-22

## 2021-11-22 RX ORDER — AZITHROMYCIN 250 MG/1
250 TABLET, FILM COATED ORAL DAILY
Qty: 4 TABLET | Refills: 0 | Status: ON HOLD | OUTPATIENT
Start: 2021-11-23 | End: 2021-11-23

## 2021-11-22 RX ADMIN — DEXTROSE MONOHYDRATE 50 ML: 500 INJECTION PARENTERAL at 20:59

## 2021-11-22 RX ADMIN — ALBUTEROL SULFATE 5 MG: 2.5 SOLUTION RESPIRATORY (INHALATION) at 21:48

## 2021-11-22 RX ADMIN — SODIUM CHLORIDE 5 UNITS: 900 INJECTION, SOLUTION INTRAVENOUS at 21:06

## 2021-11-22 RX ADMIN — CALCIUM GLUCONATE 2 G: 20 INJECTION, SOLUTION INTRAVENOUS at 23:20

## 2021-11-22 RX ADMIN — AZITHROMYCIN MONOHYDRATE 500 MG: 500 INJECTION, POWDER, LYOPHILIZED, FOR SOLUTION INTRAVENOUS at 21:44

## 2021-11-22 RX ADMIN — SODIUM ZIRCONIUM CYCLOSILICATE 10 G: 5 POWDER, FOR SUSPENSION ORAL at 22:17

## 2021-11-22 ASSESSMENT — ENCOUNTER SYMPTOMS
FEVER: 0
COUGH: 1
ABDOMINAL PAIN: 0
DIARRHEA: 0
SHORTNESS OF BREATH: 1

## 2021-11-22 ASSESSMENT — MIFFLIN-ST. JEOR: SCORE: 1501.98

## 2021-11-22 NOTE — TELEPHONE ENCOUNTER
Dr. Jackson,    Daughter calling says homecare told her to be seen for possible pneumonia green sputum, fatigue and two other symptoms that she is not remembering.  She says pt does not feel well.  Denies fevers.  Can you add her in today or should she go to ?  She has dialysis tomorrow.  She lives close and daughter can bring her right over.    Please advise.  Thanks,  Theresa Roca RN

## 2021-11-22 NOTE — TELEPHONE ENCOUNTER
Informed daughter of Dr. Jackson's note below.    She will bring her to ER.    Theresa Roca RN                No I can not see her' , I am simply overwhelmed   She should go to ER

## 2021-11-23 ENCOUNTER — HOSPITAL ENCOUNTER (INPATIENT)
Facility: CLINIC | Age: 71
LOS: 3 days | Discharge: HOME | DRG: 193 | End: 2021-11-26
Attending: EMERGENCY MEDICINE | Admitting: INTERNAL MEDICINE
Payer: MEDICARE

## 2021-11-23 ENCOUNTER — APPOINTMENT (OUTPATIENT)
Dept: GENERAL RADIOLOGY | Facility: CLINIC | Age: 71
DRG: 193 | End: 2021-11-23
Attending: EMERGENCY MEDICINE
Payer: MEDICARE

## 2021-11-23 DIAGNOSIS — J18.9 PNEUMONIA OF RIGHT LOWER LOBE DUE TO INFECTIOUS ORGANISM: ICD-10-CM

## 2021-11-23 DIAGNOSIS — E87.70 HYPERVOLEMIA, UNSPECIFIED HYPERVOLEMIA TYPE: ICD-10-CM

## 2021-11-23 DIAGNOSIS — E87.5 HYPERKALEMIA: ICD-10-CM

## 2021-11-23 DIAGNOSIS — J96.01 ACUTE RESPIRATORY FAILURE WITH HYPOXIA (H): ICD-10-CM

## 2021-11-23 DIAGNOSIS — G47.34 NOCTURNAL HYPOXIA: Primary | ICD-10-CM

## 2021-11-23 DIAGNOSIS — Z99.2 ESRD (END STAGE RENAL DISEASE) ON DIALYSIS (H): ICD-10-CM

## 2021-11-23 DIAGNOSIS — N18.6 ESRD (END STAGE RENAL DISEASE) ON DIALYSIS (H): ICD-10-CM

## 2021-11-23 LAB
ANION GAP SERPL CALCULATED.3IONS-SCNC: 7 MMOL/L (ref 3–14)
ATRIAL RATE - MUSE: 70 BPM
ATRIAL RATE - MUSE: 74 BPM
BASOPHILS # BLD AUTO: 0 10E3/UL (ref 0–0.2)
BASOPHILS NFR BLD AUTO: 0 %
BUN SERPL-MCNC: 69 MG/DL (ref 7–30)
CALCIUM SERPL-MCNC: 9.3 MG/DL (ref 8.5–10.1)
CHLORIDE BLD-SCNC: 109 MMOL/L (ref 94–109)
CO2 SERPL-SCNC: 23 MMOL/L (ref 20–32)
CREAT SERPL-MCNC: 6.43 MG/DL (ref 0.52–1.04)
DIASTOLIC BLOOD PRESSURE - MUSE: NORMAL MMHG
DIASTOLIC BLOOD PRESSURE - MUSE: NORMAL MMHG
EOSINOPHIL # BLD AUTO: 0.1 10E3/UL (ref 0–0.7)
EOSINOPHIL NFR BLD AUTO: 1 %
ERYTHROCYTE [DISTWIDTH] IN BLOOD BY AUTOMATED COUNT: 12.6 % (ref 10–15)
GFR SERPL CREATININE-BSD FRML MDRD: 6 ML/MIN/1.73M2
GLUCOSE BLD-MCNC: 94 MG/DL (ref 70–99)
GLUCOSE BLDC GLUCOMTR-MCNC: 179 MG/DL (ref 70–99)
GLUCOSE BLDC GLUCOMTR-MCNC: 225 MG/DL (ref 70–99)
GLUCOSE BLDC GLUCOMTR-MCNC: 77 MG/DL (ref 70–99)
HBA1C MFR BLD: 6.2 % (ref 0–5.6)
HCT VFR BLD AUTO: 33 % (ref 35–47)
HGB BLD-MCNC: 10 G/DL (ref 11.7–15.7)
HOLD SPECIMEN: NORMAL
HOLD SPECIMEN: NORMAL
IMM GRANULOCYTES # BLD: 0 10E3/UL
IMM GRANULOCYTES NFR BLD: 0 %
INTERPRETATION ECG - MUSE: NORMAL
INTERPRETATION ECG - MUSE: NORMAL
LYMPHOCYTES # BLD AUTO: 1.5 10E3/UL (ref 0.8–5.3)
LYMPHOCYTES NFR BLD AUTO: 15 %
MCH RBC QN AUTO: 31.9 PG (ref 26.5–33)
MCHC RBC AUTO-ENTMCNC: 30.3 G/DL (ref 31.5–36.5)
MCV RBC AUTO: 105 FL (ref 78–100)
MONOCYTES # BLD AUTO: 0.7 10E3/UL (ref 0–1.3)
MONOCYTES NFR BLD AUTO: 7 %
NEUTROPHILS # BLD AUTO: 7.7 10E3/UL (ref 1.6–8.3)
NEUTROPHILS NFR BLD AUTO: 77 %
NRBC # BLD AUTO: 0 10E3/UL
NRBC BLD AUTO-RTO: 0 /100
NT-PROBNP SERPL-MCNC: ABNORMAL PG/ML (ref 0–900)
P AXIS - MUSE: 45 DEGREES
P AXIS - MUSE: NORMAL DEGREES
PLATELET # BLD AUTO: 184 10E3/UL (ref 150–450)
POTASSIUM BLD-SCNC: 6.6 MMOL/L (ref 3.4–5.3)
PR INTERVAL - MUSE: 144 MS
PR INTERVAL - MUSE: NORMAL MS
QRS DURATION - MUSE: 80 MS
QRS DURATION - MUSE: 82 MS
QT - MUSE: 394 MS
QT - MUSE: 404 MS
QTC - MUSE: 425 MS
QTC - MUSE: 448 MS
R AXIS - MUSE: 54 DEGREES
R AXIS - MUSE: 70 DEGREES
RBC # BLD AUTO: 3.13 10E6/UL (ref 3.8–5.2)
SODIUM SERPL-SCNC: 139 MMOL/L (ref 133–144)
SYSTOLIC BLOOD PRESSURE - MUSE: NORMAL MMHG
SYSTOLIC BLOOD PRESSURE - MUSE: NORMAL MMHG
T AXIS - MUSE: 47 DEGREES
T AXIS - MUSE: 57 DEGREES
VENTRICULAR RATE- MUSE: 70 BPM
VENTRICULAR RATE- MUSE: 74 BPM
WBC # BLD AUTO: 10.1 10E3/UL (ref 4–11)

## 2021-11-23 PROCEDURE — 93005 ELECTROCARDIOGRAM TRACING: CPT

## 2021-11-23 PROCEDURE — 5A1D70Z PERFORMANCE OF URINARY FILTRATION, INTERMITTENT, LESS THAN 6 HOURS PER DAY: ICD-10-PCS | Performed by: INTERNAL MEDICINE

## 2021-11-23 PROCEDURE — 80048 BASIC METABOLIC PNL TOTAL CA: CPT | Performed by: EMERGENCY MEDICINE

## 2021-11-23 PROCEDURE — 258N000003 HC RX IP 258 OP 636: Performed by: NURSE PRACTITIONER

## 2021-11-23 PROCEDURE — 83036 HEMOGLOBIN GLYCOSYLATED A1C: CPT | Performed by: NURSE PRACTITIONER

## 2021-11-23 PROCEDURE — 250N000009 HC RX 250: Performed by: EMERGENCY MEDICINE

## 2021-11-23 PROCEDURE — 250N000011 HC RX IP 250 OP 636: Performed by: INTERNAL MEDICINE

## 2021-11-23 PROCEDURE — 90935 HEMODIALYSIS ONE EVALUATION: CPT | Performed by: INTERNAL MEDICINE

## 2021-11-23 PROCEDURE — 258N000003 HC RX IP 258 OP 636: Performed by: INTERNAL MEDICINE

## 2021-11-23 PROCEDURE — 99223 1ST HOSP IP/OBS HIGH 75: CPT | Mod: AI | Performed by: INTERNAL MEDICINE

## 2021-11-23 PROCEDURE — 85025 COMPLETE CBC W/AUTO DIFF WBC: CPT | Performed by: EMERGENCY MEDICINE

## 2021-11-23 PROCEDURE — 99285 EMERGENCY DEPT VISIT HI MDM: CPT | Mod: 25

## 2021-11-23 PROCEDURE — 250N000011 HC RX IP 250 OP 636: Performed by: NURSE PRACTITIONER

## 2021-11-23 PROCEDURE — 634N000001 HC RX 634: Performed by: INTERNAL MEDICINE

## 2021-11-23 PROCEDURE — 36415 COLL VENOUS BLD VENIPUNCTURE: CPT | Performed by: EMERGENCY MEDICINE

## 2021-11-23 PROCEDURE — 96374 THER/PROPH/DIAG INJ IV PUSH: CPT

## 2021-11-23 PROCEDURE — 90937 HEMODIALYSIS REPEATED EVAL: CPT

## 2021-11-23 PROCEDURE — 71046 X-RAY EXAM CHEST 2 VIEWS: CPT

## 2021-11-23 PROCEDURE — 83880 ASSAY OF NATRIURETIC PEPTIDE: CPT | Performed by: EMERGENCY MEDICINE

## 2021-11-23 PROCEDURE — 120N000004 HC R&B MS OVERFLOW

## 2021-11-23 PROCEDURE — 250N000012 HC RX MED GY IP 250 OP 636 PS 637: Performed by: NURSE PRACTITIONER

## 2021-11-23 PROCEDURE — 250N000013 HC RX MED GY IP 250 OP 250 PS 637: Performed by: NURSE PRACTITIONER

## 2021-11-23 RX ORDER — NICOTINE POLACRILEX 4 MG
15-30 LOZENGE BUCCAL
Status: DISCONTINUED | OUTPATIENT
Start: 2021-11-23 | End: 2021-11-26 | Stop reason: HOSPADM

## 2021-11-23 RX ORDER — TRIAMCINOLONE ACETONIDE 0.25 MG/G
OINTMENT TOPICAL 2 TIMES DAILY
Status: DISCONTINUED | OUTPATIENT
Start: 2021-11-23 | End: 2021-11-26 | Stop reason: HOSPADM

## 2021-11-23 RX ORDER — ALBUTEROL SULFATE 90 UG/1
2 AEROSOL, METERED RESPIRATORY (INHALATION) EVERY 6 HOURS PRN
Status: DISCONTINUED | OUTPATIENT
Start: 2021-11-23 | End: 2021-11-26 | Stop reason: HOSPADM

## 2021-11-23 RX ORDER — ONDANSETRON 2 MG/ML
4 INJECTION INTRAMUSCULAR; INTRAVENOUS EVERY 6 HOURS PRN
Status: DISCONTINUED | OUTPATIENT
Start: 2021-11-23 | End: 2021-11-26 | Stop reason: HOSPADM

## 2021-11-23 RX ORDER — AMOXICILLIN 250 MG
2 CAPSULE ORAL 2 TIMES DAILY PRN
Status: DISCONTINUED | OUTPATIENT
Start: 2021-11-23 | End: 2021-11-26 | Stop reason: HOSPADM

## 2021-11-23 RX ORDER — CARVEDILOL 12.5 MG/1
25 TABLET ORAL 2 TIMES DAILY WITH MEALS
Status: DISCONTINUED | OUTPATIENT
Start: 2021-11-23 | End: 2021-11-26 | Stop reason: HOSPADM

## 2021-11-23 RX ORDER — NYSTATIN 100000 U/G
OINTMENT TOPICAL 2 TIMES DAILY PRN
Status: DISCONTINUED | OUTPATIENT
Start: 2021-11-23 | End: 2021-11-26 | Stop reason: HOSPADM

## 2021-11-23 RX ORDER — HEPARIN SODIUM 1000 [USP'U]/ML
500 INJECTION, SOLUTION INTRAVENOUS; SUBCUTANEOUS CONTINUOUS
Status: DISCONTINUED | OUTPATIENT
Start: 2021-11-23 | End: 2021-11-23

## 2021-11-23 RX ORDER — CEFTRIAXONE 2 G/1
2 INJECTION, POWDER, FOR SOLUTION INTRAMUSCULAR; INTRAVENOUS EVERY 24 HOURS
Status: DISCONTINUED | OUTPATIENT
Start: 2021-11-23 | End: 2021-11-26

## 2021-11-23 RX ORDER — SEVELAMER CARBONATE 800 MG/1
800 TABLET, FILM COATED ORAL
Status: DISCONTINUED | OUTPATIENT
Start: 2021-11-23 | End: 2021-11-26 | Stop reason: HOSPADM

## 2021-11-23 RX ORDER — IPRATROPIUM BROMIDE AND ALBUTEROL SULFATE 2.5; .5 MG/3ML; MG/3ML
3 SOLUTION RESPIRATORY (INHALATION) ONCE
Status: COMPLETED | OUTPATIENT
Start: 2021-11-23 | End: 2021-11-23

## 2021-11-23 RX ORDER — ONDANSETRON 4 MG/1
4 TABLET, ORALLY DISINTEGRATING ORAL EVERY 6 HOURS PRN
Status: DISCONTINUED | OUTPATIENT
Start: 2021-11-23 | End: 2021-11-26 | Stop reason: HOSPADM

## 2021-11-23 RX ORDER — ACETAMINOPHEN 325 MG/1
650 TABLET ORAL EVERY 6 HOURS PRN
Status: DISCONTINUED | OUTPATIENT
Start: 2021-11-23 | End: 2021-11-26 | Stop reason: HOSPADM

## 2021-11-23 RX ORDER — AMLODIPINE BESYLATE 5 MG/1
5 TABLET ORAL 2 TIMES DAILY
Status: DISCONTINUED | OUTPATIENT
Start: 2021-11-23 | End: 2021-11-26 | Stop reason: HOSPADM

## 2021-11-23 RX ORDER — ALBUMIN (HUMAN) 12.5 G/50ML
50 SOLUTION INTRAVENOUS
Status: DISCONTINUED | OUTPATIENT
Start: 2021-11-23 | End: 2021-11-23

## 2021-11-23 RX ORDER — AMOXICILLIN 250 MG
1 CAPSULE ORAL 2 TIMES DAILY PRN
Status: DISCONTINUED | OUTPATIENT
Start: 2021-11-23 | End: 2021-11-26 | Stop reason: HOSPADM

## 2021-11-23 RX ORDER — FUROSEMIDE 40 MG
80 TABLET ORAL 2 TIMES DAILY
Status: DISCONTINUED | OUTPATIENT
Start: 2021-11-23 | End: 2021-11-26 | Stop reason: HOSPADM

## 2021-11-23 RX ORDER — POLYETHYLENE GLYCOL 3350 17 G/17G
17 POWDER, FOR SOLUTION ORAL DAILY PRN
Status: DISCONTINUED | OUTPATIENT
Start: 2021-11-23 | End: 2021-11-26 | Stop reason: HOSPADM

## 2021-11-23 RX ORDER — DEXTROSE MONOHYDRATE 25 G/50ML
25-50 INJECTION, SOLUTION INTRAVENOUS
Status: DISCONTINUED | OUTPATIENT
Start: 2021-11-23 | End: 2021-11-26 | Stop reason: HOSPADM

## 2021-11-23 RX ORDER — GABAPENTIN 100 MG/1
100 CAPSULE ORAL 3 TIMES DAILY PRN
Status: DISCONTINUED | OUTPATIENT
Start: 2021-11-23 | End: 2021-11-26 | Stop reason: HOSPADM

## 2021-11-23 RX ORDER — ATORVASTATIN CALCIUM 20 MG/1
20 TABLET, FILM COATED ORAL DAILY
Status: DISCONTINUED | OUTPATIENT
Start: 2021-11-23 | End: 2021-11-26 | Stop reason: HOSPADM

## 2021-11-23 RX ORDER — ACETAMINOPHEN 650 MG/1
650 SUPPOSITORY RECTAL EVERY 6 HOURS PRN
Status: DISCONTINUED | OUTPATIENT
Start: 2021-11-23 | End: 2021-11-26 | Stop reason: HOSPADM

## 2021-11-23 RX ORDER — CEFTRIAXONE 2 G/1
2 INJECTION, POWDER, FOR SOLUTION INTRAMUSCULAR; INTRAVENOUS EVERY 24 HOURS
Status: DISCONTINUED | OUTPATIENT
Start: 2021-11-23 | End: 2021-11-23

## 2021-11-23 RX ADMIN — SERTRALINE HYDROCHLORIDE 50 MG: 50 TABLET ORAL at 21:21

## 2021-11-23 RX ADMIN — HEPARIN SODIUM 500 UNITS/HR: 1000 INJECTION INTRAVENOUS; SUBCUTANEOUS at 09:59

## 2021-11-23 RX ADMIN — EPOETIN ALFA-EPBX 4000 UNITS: 4000 INJECTION, SOLUTION INTRAVENOUS; SUBCUTANEOUS at 11:50

## 2021-11-23 RX ADMIN — SODIUM CHLORIDE 300 ML: 9 INJECTION, SOLUTION INTRAVENOUS at 09:55

## 2021-11-23 RX ADMIN — IPRATROPIUM BROMIDE AND ALBUTEROL SULFATE 3 ML: .5; 3 SOLUTION RESPIRATORY (INHALATION) at 07:13

## 2021-11-23 RX ADMIN — INSULIN ASPART 1 UNITS: 100 INJECTION, SOLUTION INTRAVENOUS; SUBCUTANEOUS at 17:42

## 2021-11-23 RX ADMIN — AMLODIPINE BESYLATE 5 MG: 5 TABLET ORAL at 19:33

## 2021-11-23 RX ADMIN — GABAPENTIN 100 MG: 100 CAPSULE ORAL at 23:39

## 2021-11-23 RX ADMIN — TRIAMCINOLONE ACETONIDE: 0.25 OINTMENT TOPICAL at 21:21

## 2021-11-23 RX ADMIN — INSULIN GLARGINE 18 UNITS: 100 INJECTION, SOLUTION SUBCUTANEOUS at 21:57

## 2021-11-23 RX ADMIN — CEFTRIAXONE SODIUM 2 G: 2 INJECTION, POWDER, FOR SOLUTION INTRAMUSCULAR; INTRAVENOUS at 15:05

## 2021-11-23 RX ADMIN — CARVEDILOL 25 MG: 12.5 TABLET, FILM COATED ORAL at 17:19

## 2021-11-23 RX ADMIN — HEPARIN SODIUM 500 UNITS: 1000 INJECTION INTRAVENOUS; SUBCUTANEOUS at 09:59

## 2021-11-23 RX ADMIN — SODIUM CHLORIDE 250 ML: 9 INJECTION, SOLUTION INTRAVENOUS at 09:56

## 2021-11-23 RX ADMIN — ATORVASTATIN CALCIUM 20 MG: 20 TABLET, FILM COATED ORAL at 17:19

## 2021-11-23 RX ADMIN — ACETAMINOPHEN 650 MG: 325 TABLET, FILM COATED ORAL at 23:39

## 2021-11-23 RX ADMIN — SEVELAMER CARBONATE 800 MG: 800 TABLET, FILM COATED ORAL at 17:42

## 2021-11-23 RX ADMIN — AZITHROMYCIN MONOHYDRATE 250 MG: 500 INJECTION, POWDER, LYOPHILIZED, FOR SOLUTION INTRAVENOUS at 21:21

## 2021-11-23 ASSESSMENT — ACTIVITIES OF DAILY LIVING (ADL)
ADLS_ACUITY_SCORE: 10
ADLS_ACUITY_SCORE: 16
ADLS_ACUITY_SCORE: 10
ADLS_ACUITY_SCORE: 16
ADLS_ACUITY_SCORE: 10
ADLS_ACUITY_SCORE: 16
ADLS_ACUITY_SCORE: 16
ADLS_ACUITY_SCORE: 10

## 2021-11-23 ASSESSMENT — MIFFLIN-ST. JEOR: SCORE: 1508.79

## 2021-11-23 ASSESSMENT — ENCOUNTER SYMPTOMS
TREMORS: 1
SHORTNESS OF BREATH: 1

## 2021-11-23 NOTE — TELEPHONE ENCOUNTER
[facility] calling [facility]. [facility] still needs more information to support need of the hospital bed. Ok to amend office visit notes from July. Needs to have more than just stating for pressure ulcers because patient was given an alternating air mattress overlay for that issue.    Example: document needs bilateral side rails d/t amputation to enable transfers, or shortness of breath due to CHF so needs the head of bed to elevate, etc.     Thank you,  Enedina Bruno RN  [place]

## 2021-11-23 NOTE — PLAN OF CARE
Alert and oriented  X4. VSS. Denies pain. Up with a lift. Dialysis run today. IV SL, rocephin given this shift. Blood glucose 77, 179. Tolerating diet. L AKA. Plan to continue to monitor tonight.
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
## 2021-11-23 NOTE — PHARMACY-ADMISSION MEDICATION HISTORY
Pharmacy Medication History  Admission medication history interview status for the 11/23/2021  admission is complete. See EPIC admission navigator for prior to admission medications     Location of Interview: Phone  Medication history sources: Patient's family/friend (Caroline (daughter))    Significant changes made to the medication list:  Done w/ eliquis and all abx.    In the past week, patient estimated taking medication this percent of the time: greater than 90%        Medication reconciliation completed by provider prior to medication history? No    Time spent in this activity: 25min    Prior to Admission medications    Medication Sig Last Dose Taking? Auth Provider   acetaminophen (TYLENOL) 325 MG tablet Take 2 tablets (650 mg) by mouth every 4 hours as needed for mild pain  Yes Samuel Tinajero MD   albuterol (PROAIR HFA/PROVENTIL HFA/VENTOLIN HFA) 108 (90 Base) MCG/ACT inhaler Inhale 2 puffs into the lungs every 6 hours as needed for shortness of breath / dyspnea or wheezing  Yes Noam Jackson MD   amLODIPine (NORVASC) 5 MG tablet Take 1 tablet (5 mg) by mouth 2 times daily 11/22/2021 at Unknown time Yes Silva Guerrero NP   apremilast (OTEZLA) 30 MG tablet Take 1 tablet (30 mg) by mouth daily 11/22/2021 at Unknown time Yes Lynntete Herrera MD   atorvastatin (LIPITOR) 20 MG tablet TAKE 1 TABLET BY MOUTH EVERY DAY IN THE EVENING 11/22/2021 at Unknown time Yes Noam Jackson MD   carvedilol (COREG) 25 MG tablet Take 1 tablet (25 mg) by mouth 2 times daily (with meals) 11/22/2021 at Unknown time Yes Karen Lynn NP   ciclopirox (LOPROX) 0.77 % cream Apply topically 2 times daily 11/22/2021 at Unknown time Yes Eleazar Pack DPM   desonide (DESOWEN) 0.05 % external ointment Apply topically as needed  Yes Reported, Patient   Epoetin Martínez (EPOGEN IJ) Inject 800 Units into the vein three times a week tues thurs sat at dialysis  Yes Unknown, Entered By History    furosemide (LASIX) 80 MG tablet Take 1 tablet (80 mg) by mouth 2 times daily 11/22/2021 at Unknown time Yes Karen Lynn NP   gabapentin (NEURONTIN) 100 MG capsule Take 1 capsule (100 mg) by mouth 3 times daily as needed (pain)  Yes Karen Lynn NP   Glucagon (BAQSIMI ONE PACK) 3 MG/DOSE POWD Spray 3 mg in nostril See Admin Instructions USE ONLY FOR SEVERE HYPOGLYCEMIA.  Yes Arabella Kamara PA-C   insulin aspart (NOVOLOG FLEXPEN) 100 UNIT/ML pen INJECT 5 UNITS SUBCUTANEOUSLY WITH BREAKFAST, LUNCH AND DINNER. 11/22/2021 at Unknown time Yes Arabella Kamara PA-C   insulin glargine (BASAGLAR KWIKPEN) 100 UNIT/ML pen Inject 18 units subcutaneous daily.  Patient taking differently: Inject 18 Units Subcutaneous At Bedtime Inject 18 units subcutaneous daily. 11/22/2021 at Unknown time Yes Arabella Kamara PA-C   Iron Sucrose (VENOFER IV) Inject 50 mg into the vein twice a week At dialysis session (tues/Sat)  Yes Unknown, Entered By History   miconazole (MICATIN) 2 % external powder Apply twice daily to skin folds as needed  Yes Librado Dumont MD   nystatin (MYCOSTATIN) 983779 UNIT/GM external ointment Twice daily to finger rash until healed.  Patient taking differently: Apply topically 2 times daily as needed Twice daily to finger rash until healed.  Yes Lynnette Herrera MD   sertraline (ZOLOFT) 50 MG tablet TAKE 1 TABLET BY MOUTH AT BEDTIME 11/22/2021 at Unknown time Yes Noam Jackson MD   sevelamer carbonate (RENVELA) 800 MG tablet Take 1 tablet (800 mg) by mouth Take with snacks or supplements Take 2 capsules with meals and 1 with snacks. 11/22/2021 at Unknown time Yes Karen Lynn NP   triamcinolone (KENALOG) 0.025 % external ointment Apply topically 2 times daily To rash under breasts and groin as needed  Yes Lynnette Herrera MD   vitamin D3 (CHOLECALCIFEROL) 50 mcg (2000 units) tablet Take 1 tablet (50 mcg) by mouth daily 11/22/2021 at Unknown time Yes  "Noam Jackson MD   blood glucose (ONE TOUCH DELICA) lancing device Device to be used with lancets.needs lancets device for delica lancets   Noam Jackson MD   blood glucose (ONETOUCH ULTRA) test strip TEST YOUR BLOOD SUGAR 3-4 TIMES PER DAY.   Arabella Kamara PA-C   Continuous Blood Gluc Sensor (FREESTYLE PHOENIX 2 SENSOR) MISC 1 each every 14 days 1 each every 14 days. Change every 14 days.   Arabella Kamara PA-C   insulin pen needle (ULTICARE MINI) 31G X 6 MM Use daily or as directed.   Noam Jackson MD   order for DME Equipment being ordered: mattress overlay for hospital bed  Wt. 192#  Height 5'5\"  99 months/Lifetime   Noam Jackson MD   order for DME 1 wheelchair   Noam Jackson MD       The information provided in this note is only as accurate as the sources available at the time of update(s)     "

## 2021-11-23 NOTE — ED PROVIDER NOTES
History   Chief Complaint:  Shortness of Breath and Cough    The history is provided by the patient and a caregiver (daughter).      Supriya Herr is a 71 year old female who is anticoagulated on Eliquis with history of diabetes mellitus type 2, chronic kidney disease, congestive heart failure, obstructive sleep apnea, and ESRD on dialysis (TThSat) who presents with shortness of breath and cough. The patient states she has had a productive cough since this morning. Her home nurse came today and was advised to visit her primary care provider, however there were no available appointments so she was advised to visit the emergency department. She notes she had no symptoms prior to today. She had COVID this past August and has received her COVID vaccine. She receives dialysis at [facility] in [facility] on Tuesday, Thursday, and Saturday. She denies fevers, abdominal pain, or diarrhea. She still makes some urine without abnormal changes. She denies recent antibiotic use.     Review of Systems   Constitutional: Negative for fever.   Respiratory: Positive for cough and shortness of breath.    Gastrointestinal: Negative for abdominal pain and diarrhea.   Genitourinary: Negative.    All other systems reviewed and are negative.    Allergies:  Penicillins  Unasyn    Medications:  Albuterol inhaler  Norvasc  Eliquis  Otezla  Lipitor  Coreg  Epogen  Lasix  Neurontin  Novolog pen  Basaglar pen  Levaquin  Zofran  Zoloft  Renvela    Past Medical History:     Anemia  Anxiety  Depression  Basal cell carcinoma  Chronic kidney disease, stage 5  Congestive heart failure  Dialysis patient   Dyslipidemia  Hyperlipidemia  Hypertension  Psoriasis  Obstructive sleep apnea  Traumatic amputation of leg above knee  Diabetes mellitus, type 2  Vitiligo  Osteoarthrosis  Stage 1 pressure ulcer  Obesity  Cellulitis  Diabetic foot infection  Pseudophakia of left eye  Peripheral vascular disease  ESRD on dialysis   Steal syndrome as complication of  "dialysis access    Past Surgical History:    Leg amputation, left above the knee  Cataract removal, bilateral  Colonoscopy  Arteriovenous fistula, right upper extremity   Arteriovenous graft, right upper extremity   Exostosis excision, right foot  Phacoemulsification with IOL, bilateral    Family History:    Mother - Diabetes, Hypertension, Eyes Disorder, Arthritis, Obesity, Congestive Heart Failure, Deep Vein Thrombosis  Father - Cerebrovascular Disease, Arthritis, Congestive Heart Failure  Sister - Pacemaker, Arthritis  Brother - Lung Disease, Cancer, Polio    Social History:  The patient was accompanied to the emergency department by her daughter.   She lives with her daughter.     Physical Exam     Patient Vitals for the past 24 hrs:   BP Temp Temp src Pulse Resp SpO2 Height Weight   11/22/21 2130 (!) 174/64 -- -- -- -- 96 % -- --   11/22/21 1740 105/55 98.1  F (36.7  C) Temporal 72 18 97 % 1.727 m (5' 8\") 94.3 kg (208 lb)     Physical Exam  General: Alert, appears well-developed and well-nourished. Cooperative.     In mild distress  HEENT:  Head:  Atraumatic  Ears:  External ears are normal  Mouth/Throat:  Oropharynx is without erythema or exudate and mucous membranes are moist.   Eyes:   Conjunctivae normal and EOM are normal. No scleral icterus.  CV:  Normal rate, regular rhythm, normal heart sounds and radial pulses are 2+ and symmetric.  Systolic murmur.  Resp:  Breath sounds are coarse bilaterally.  Productive cough.     Non-labored, no retractions or accessory muscle use.  No hypoxia.   GI:  Abdomen is soft, no distension, no tenderness. No rebound or guarding.  No CVA tenderness bilaterally  MS:  Left AKA.     No edema.      Back atraumatic.    No midline cervical, thoracic, or lumbar tenderness  Skin:  Warm and dry.  No rash or lesions noted.  Neuro: Alert. Baseline neurologic function. GCS: 15  Psych:  Normal mood and affect.    Emergency Department Course   ECG  ECG obtained at 1948, ECG read at " 2007  Normal sinus rhythm  Nonspecific ST abnormality  Abnormal ECG   No significant change as compared to prior, dated 05/07/20.  Rate 70 bpm. TX interval 144 ms. QRS duration 82 ms. QT/QTc 394/425 ms. P-R-T axes 45 54 47.     Imaging:  XR Chest PA & LAT  IMPRESSION: Mild right base atelectasis and or infiltrate and minimal elevation of the right hemidiaphragm. Left lung grossly clear.  Reading per radiology.    Laboratory:  CBC: WBC 10.9, HGB 10.2 (L),    CMP: Potassium 6.4 (L!), Chloride 110 (H), Urea Nitrogen 64 (H), Creatinine 5.82 (H), Albumin 3.1 (L), GFR 7 (L), o/w WNL   Glucose by meter (Collected 2230): 159 (H)   Potassium (Collected 1803): 6.4 (H!)  Potassium (Collected 2003): 6.4 (H!)  Potassium (Collected 2225): 6.4 (H!)  Potassium (Collected 2312): 5.5 (H)    Symptomatic Influenza A/B & SARS-CoV2 (COVID19) Virus PCR Multiplex: negative      Emergency Department Course:  Reviewed:  I reviewed nursing notes, vitals, past medical history and Care Everywhere    Assessments:  1944 I obtained history and examined the patient as noted above.   2035 I rechecked the patient and explained findings.  2347 I rechecked the patient and explained new potassium level of 5.5. Advised for discharge.      Consults:  2040 I spoke with Dr. Lezama of the nephrology service regarding patient's presentation, findings, and plan of care.   2312 I spoke with Dr. Lezama of the nephrology service regarding patient's findings and plan of care.     Interventions:  2059 Dextrose 50% 50 mL IV  2106 Insulin 1 unit/mL, 5 units IV  2144 Zithromax 500 mg IV  2148 Albuterol 5 mg nebulization   2217 Lokelma 10 g PO  2320 Calcium gluconate 2 g IV    Disposition:  The patient was discharged to home.     Impression & Plan   Medical Decision Making:  Patient is a 71-year-old female with a complex past medical history pertinent for ESRD on dialysis, CHF, PVD, diabetes, and hypertension who presents with cough and sputum production for the  last several days.  Of note patient recently recovered from a Covid infection in August of this year.  Thankfully influenza and Covid negative tonight.  Chest x-ray concerning for a right lower lobe infiltrate particular in the setting of infectious symptoms such as cough and sputum production.  Patient is afebrile and white blood cell count within normal limits.  Low concern for sepsis and so I think it be reasonable to start patient on oral antibiotics for suspected community-acquired pneumonia.  Unfortunately patient was found to be hyperkalemic and so repeat potassium was obtained which continues to show hyperkalemia.  EKG showed no concerning peaked T waves and so I did contact nephrology, Dr. Lezama.  We discussed shifting the patient's potassium this evening with insulin, albuterol, and Lokelma.  After initial shifting and potassium recheck her potassium continues to remain at 6.4, although this was a hemolyzed sample.  A fourth redraw potassium was obtained by a laboratory phlebotomist, and thankfully did show a decrease to 5.5.    At this time I think it be reasonable to continue with her outpatient [facility] dialysis appointment as scheduled at 11:30 AM this morning.  We would continue to treat a community-acquired pneumonia with Cefpodoxime daily for 7 days as well as azithromycin for the next 4 days as she had received her first dose of antibiotics while in the emergency department this evening.  Follow-up with primary care in 1 week for recheck regarding cough and community-acquired pneumonia.  Return sooner to the emergency department if worsening shortness of breath, fevers, or inability to tolerate oral intake.  Discharged home in care of daughter    Critical Care Time: was 40 minutes for this patient excluding procedures    Diagnosis:    ICD-10-CM    1. Pneumonia of right lower lobe due to infectious organism  J18.9    2. Productive cough  R05.8    3. Sputum production  R05.8    4. Hyperkalemia  E87.5     5. ESRD (end stage renal disease) on dialysis (H)  N18.6     Z99.2      Discharge Medications:     Medication List      Started    azithromycin 250 MG tablet  Commonly known as: ZITHROMAX  250 mg, Oral, DAILY  Start taking on: November 23, 2021     cefpodoxime 200 MG tablet  Commonly known as: VANTIN  200 mg, Oral, DAILY          Scribe Disclosure:  I, Bety Morocho, am serving as a scribe at 7:38 PM on 11/22/2021 to document services personally performed by Isrrael Lopez MD based on my observations and the provider's statements to me.      Isrrael Lopez MD  11/23/21 0019

## 2021-11-23 NOTE — ED NOTES
Bed: ED02  Expected date:   Expected time:   Means of arrival:   Comments:  HEMS 451 71f possible high potassium, tremors eta 0691
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
## 2021-11-23 NOTE — ED NOTES
"[facility]  ED Nurse Handoff Report    ED Chief complaint: Shortness of Breath and Cough      ED Diagnosis:   Final diagnoses:   Productive cough   Sputum production   Pneumonia of right lower lobe due to infectious organism       Code Status: Full Code    Allergies:   Allergies   Allergen Reactions     Penicillins Rash     Unasyn Rash     No evidence SJS, but very uncomfortable and precipitated multiple provider visits. Would not use penicillins again if other options available.        Patient Story: Pt. Comes to the ER with prod. Cough and SOB with exersion. HX of COVID in August.    Focused Assessment:  Pt. Is alert and orient. Times 3, Intermittant cough that is currently dry. +SOB with minimal exersion.     Treatments and/or interventions provided: Zithromax 500mg IVPB, Rocephin 2 grams IVPB, Albuterol Neb 5mg, D50 one amp IVP, Regular insulin 5 ujnits IV, Lokelma 10grams PO.   Patient's response to treatments and/or interventions: Pt. Resting in room with intermittant cough, watching TV.     To be done/followed up on inpatient unit:  Nothing.     Does this patient have any cognitive concerns?: None    Activity level - Baseline/Home:  Wheelchair  Activity Level - Current:   Unknown    Patient's Preferred language: English   Needed?: No    Isolation: None  Infection: Not Applicable  Patient tested for COVID 19 prior to admission: YES  Bariatric?: No    Vital Signs:   Vitals:    11/22/21 1740 11/22/21 2130   BP: 105/55 (!) 174/64   Pulse: 72    Resp: 18    Temp: 98.1  F (36.7  C)    TempSrc: Temporal    SpO2: 97% 96%   Weight: 94.3 kg (208 lb)    Height: 1.727 m (5' 8\")        Cardiac Rhythm:     Was the PSS-3 completed:   Yes  What interventions are required if any?               Family Comments: Daughter Caroline has gone home  OBS brochure/video discussed/provided to patient/family: N/A              Name of person given brochure if not patient:                Relationship to patient:  "     For the majority of the shift this patient's behavior was Green.   Behavioral interventions performed were None.    ED NURSE PHONE NUMBER: [phone]

## 2021-11-23 NOTE — PROGRESS NOTES
[facility]  Patient is currently open to home care services with [facility]. The patient is currently receiving Skilled Nursing services.  and home health team have been notified of patient admission. [facility] liaison will continue to follow patient during stay. If appropriate provide orders to resume home care at time of discharge.

## 2021-11-23 NOTE — TELEPHONE ENCOUNTER
Note printed and fax to Forbes RoadAlfreda with [facility] notified and verbalized understanding.     Thanks,  SHANTA Krause  [facility]

## 2021-11-23 NOTE — PLAN OF CARE
RECEIVING UNIT ED HANDOFF REVIEW    ED Nurse Handoff Report was reviewed by: Blaise Pierson RN on November 23, 2021 at 9:09 AM

## 2021-11-23 NOTE — ED TRIAGE NOTES
Pt was discharged from ED at midnight. Pt went home and unable to sleep due to tremors in her hands and one leg. Pt states she was told her potassium was high upon discharge. Pt is a dialysis pt and her dialysis on Saturday.

## 2021-11-23 NOTE — ED NOTES
[facility]  ED Nurse Handoff Report    ED Chief complaint: Tremors      ED Diagnosis:   Final diagnoses:   Hyperkalemia   ESRD (end stage renal disease) on dialysis (H)   Acute respiratory failure with hypoxia (H)   Hypervolemia, unspecified hypervolemia type   Pneumonia of right lower lobe due to infectious organism       Code Status: not discussed by ED RN     Allergies:   Allergies   Allergen Reactions     Penicillins Rash     Unasyn Rash     No evidence SJS, but very uncomfortable and precipitated multiple provider visits. Would not use penicillins again if other options available.        Patient Story: 71F 'not feeling well'   Focused Assessment:  Pt here last night and attempted to discharge home. She was tremulous and didn't feel well so she returned. She gets dialysis on Tuesday, Thursday, and Saturday. She had high K last night, potassium shifted and K was down some. Now K+ back up to 6.6. BNP elevated. Has pneumonia    Treatments and/or interventions provided: nebulizer  Patient's response to treatments and/or interventions:     To be done/followed up on inpatient unit:      Does this patient have any cognitive concerns?: N/A    Activity level - Baseline/Home:  Unknown  Activity Level - Current:   Unknown    Patient's Preferred language: English   Needed?: No    Isolation: None  Infection: Not Applicable  Patient tested for COVID 19 prior to admission: YES  Bariatric?: No    Vital Signs:   Vitals:    11/23/21 0630 11/23/21 0640 11/23/21 0700 11/23/21 0730   BP: 129/60  105/57 128/66   Pulse: 76 76 76 72   Resp: 22 10 25 16   Temp: 99.1  F (37.3  C)      TempSrc: Oral      SpO2: 98% 92%  90%   Weight:       Height:           Cardiac Rhythm:     Was the PSS-3 completed:   Yes  What interventions are required if any?               Family Comments:   OBS brochure/video discussed/provided to patient/family: N/A              Name of person given brochure if not patient:                Relationship to patient:     For the majority of the shift this patient's behavior was Green.   Behavioral interventions performed were .    ED NURSE PHONE NUMBER: [phone]

## 2021-11-23 NOTE — TELEPHONE ENCOUNTER
Amended 7/23 note to include  ds bilateral side rails d/t amputation to enable transfers, or shortness of breath due to CHF so needs the head of bed to elevate, etc

## 2021-11-23 NOTE — DISCHARGE INSTRUCTIONS
Go to dialysis as scheduled tomorrow!    Continue to take your antibiotics to completion of course!    Follow up with your primary care provider in 7 days for re-check, back to the Emergency Department if you have worsening shortness of breath, fevers, or vomiting.

## 2021-11-23 NOTE — CONSULTS
"         Assessment and Plan:   ESRD: dialysis today. Usually runs . Plan 3.5h, LUAG with 400 BFR, low dose heparin, 1K, 38 HCO3 and 140 Na. Plan on 3L UF. EPO on the run for anemia.     We are using her LUAG but she has a new HEMA in the upper arm with good bruit on exam. Not ready for use yet.                 Interval History:   Pneumonia: on antibiotics started yesterday.  DM  CHF  S/P L AKA  COVID-19 recovered, . Fully vaccinated.                  Review of Systems:   C/O back pain. Sitting up in chair is more comfortable.           Medications:       Current active medications and PTA medications reviewed, see medication list for details.            Physical Exam:   Vitals were reviewed  Patient Vitals for the past 24 hrs:   BP Temp Temp src Pulse Resp SpO2 Height Weight   21 0830 134/54 -- -- 71 16 -- -- --   21 0800 110/40 -- -- 71 15 -- -- --   21 0730 128/66 -- -- 72 16 90 % -- --   21 0700 105/57 -- -- 76 25 -- -- --   21 0640 -- -- -- 76 10 92 % -- --   21 0630 129/60 99.1  F (37.3  C) Oral 76 22 98 % -- --   21 0627 -- -- -- -- -- -- 1.651 m (5' 5\") 99.8 kg (220 lb)       Temp:  [98.1  F (36.7  C)-99.1  F (37.3  C)] 99.1  F (37.3  C)  Pulse:  [71-76] 71  Resp:  [10-25] 16  BP: (105-174)/(40-66) 134/54  SpO2:  [90 %-98 %] 90 %    Temperatures:  Current - Temp: 99.1  F (37.3  C); Max - Temp  Av.6  F (37  C)  Min: 98.1  F (36.7  C)  Max: 99.1  F (37.3  C)  Respiration range: Resp  Av.4  Min: 10  Max: 25  Pulse range: Pulse  Av.4  Min: 71  Max: 76  Blood pressure range: Systolic (24hrs), Av , Min:105 , Max:174   ; Diastolic (24hrs), Av, Min:40, Max:66    Pulse oximetry range: SpO2  Av.6 %  Min: 90 %  Max: 98 %    No intake/output data recorded.    No intake or output data in the 24 hours ending 21 0922    Alert, obese, sitting up in bed  Lungs with bibasilar rales  Cor RRR nl S1 S2 no M  LUAG needles in place  Hema with good " thrill and bruit  LE no edema       Wt Readings from Last 4 Encounters:   11/23/21 99.8 kg (220 lb)   11/22/21 94.3 kg (208 lb)   08/24/21 72.8 kg (160 lb 7.9 oz)   07/30/21 116 kg (255 lb 11.7 oz)          Data:          Lab Results   Component Value Date     11/23/2021     11/22/2021     08/23/2021     04/17/2021     04/09/2021     11/18/2020    Lab Results   Component Value Date    CHLORIDE 109 11/23/2021    CHLORIDE 110 11/22/2021    CHLORIDE 105 08/23/2021    CHLORIDE 105 04/17/2021    CHLORIDE 101 04/09/2021    CHLORIDE 106 11/18/2020    Lab Results   Component Value Date    BUN 69 11/23/2021    BUN 64 11/22/2021    BUN 99 08/23/2021    BUN 68 04/17/2021    BUN 53 04/09/2021    BUN 44 11/18/2020      Lab Results   Component Value Date    POTASSIUM 6.6 11/23/2021    POTASSIUM 5.5 11/22/2021    POTASSIUM 6.4 11/22/2021    POTASSIUM 4.2 04/17/2021    POTASSIUM 3.8 04/16/2021    POTASSIUM 3.9 04/09/2021    Lab Results   Component Value Date    CO2 23 11/23/2021    CO2 24 11/22/2021    CO2 24 08/23/2021    CO2 23 04/17/2021    CO2 22 04/09/2021    CO2 29 11/18/2020    Lab Results   Component Value Date    CR 6.43 11/23/2021    CR 5.82 11/22/2021    CR 6.81 08/23/2021    CR 5.98 04/17/2021    CR 4.35 04/16/2021    CR 5.28 04/09/2021        Recent Labs   Lab Test 11/23/21  0634 11/22/21  1803 08/23/21  0643   WBC 10.1 10.9 4.5   HGB 10.0* 10.2* 11.1*   HCT 33.0* 32.8* 33.9*   * 105* 97    181 209     Recent Labs   Lab Test 11/22/21  1803 08/17/21  0715 06/05/20  1307   AST 15 14 6   ALT 20 17 12   ALKPHOS 121 96 53   BILITOTAL 0.3 0.4 0.1*       Recent Labs   Lab Test 04/17/21  0755 11/18/20  0636 05/08/20  0540   MAG 2.7* 2.1 2.6*     Recent Labs   Lab Test 08/23/21  0643 08/20/21  0734 08/18/21  0727   PHOS 5.2* 4.6* 7.0*     Recent Labs   Lab Test 11/23/21  0634 11/22/21  1803 08/23/21  0643   JODY 9.3 9.3 9.3       Lab Results   Component Value Date    JODY 9.3  11/23/2021     Lab Results   Component Value Date    WBC 10.1 11/23/2021    HGB 10.0 (L) 11/23/2021    HCT 33.0 (L) 11/23/2021     (H) 11/23/2021     11/23/2021     Lab Results   Component Value Date     11/23/2021    POTASSIUM 6.6 (HH) 11/23/2021    CHLORIDE 109 11/23/2021    CO2 23 11/23/2021    GLC 94 11/23/2021     Lab Results   Component Value Date    BUN 69 (H) 11/23/2021    CR 6.43 (H) 11/23/2021     Lab Results   Component Value Date    MAG 2.7 (H) 04/17/2021     Lab Results   Component Value Date    PHOS 5.2 (H) 08/23/2021       Creatinine   Date Value Ref Range Status   11/23/2021 6.43 (H) 0.52 - 1.04 mg/dL Final   11/22/2021 5.82 (H) 0.52 - 1.04 mg/dL Final   08/23/2021 6.81 (H) 0.52 - 1.04 mg/dL Final   08/20/2021 5.60 (H) 0.52 - 1.04 mg/dL Final   08/18/2021 6.73 (H) 0.52 - 1.04 mg/dL Final   08/17/2021 5.19 (H) 0.52 - 1.04 mg/dL Final   04/17/2021 5.98 (H) 0.52 - 1.04 mg/dL Final   04/16/2021 4.35 (H) 0.52 - 1.04 mg/dL Final   04/09/2021 5.28 (H) 0.52 - 1.04 mg/dL Final   11/18/2020 4.23 (H) 0.52 - 1.04 mg/dL Final   11/16/2020 5.08 (H) 0.52 - 1.04 mg/dL Final   09/25/2020 6.87 (H) 0.52 - 1.04 mg/dL Final       Attestation:  I have reviewed today's vital signs, notes, medications, labs and imaging.  Seen on dialysis.      Braden Oneill MD

## 2021-11-23 NOTE — PROGRESS NOTES
Potassium   Date Value Ref Range Status   11/23/2021 6.6 (HH) 3.4 - 5.3 mmol/L Final   04/17/2021 4.2 3.4 - 5.3 mmol/L Final     Hemoglobin   Date Value Ref Range Status   11/23/2021 10.0 (L) 11.7 - 15.7 g/dL Final   04/16/2021 10.9 (L) 11.7 - 15.7 g/dL Final     Creatinine   Date Value Ref Range Status   11/23/2021 6.43 (H) 0.52 - 1.04 mg/dL Final   04/17/2021 5.98 (H) 0.52 - 1.04 mg/dL Final     Urea Nitrogen   Date Value Ref Range Status   11/23/2021 69 (H) 7 - 30 mg/dL Final   04/17/2021 68 (H) 7 - 30 mg/dL Final     Sodium   Date Value Ref Range Status   11/23/2021 139 133 - 144 mmol/L Final   04/17/2021 137 133 - 144 mmol/L Final     INR   Date Value Ref Range Status   04/16/2021 1.14 0.86 - 1.14 Final       DIALYSIS PROCEDURE NOTE  Hepatitis status of previous patient on machine log was checked and verified ok to use with this patients hepatitis status.  Patient dialyzed for 3.5 hrs. on a K1 bath for 1 hour, then a K2 for the remainder of tx with a net fluid removal of  3L.  A BFR of 400-450 ml/min was obtained via a upper LAVG using 15 gauge needles.      The treatment plan was discussed with Dr. Oneill during the treatment.    Total heparin received during the treatment: 0 units.   Needle cannulation sites held x 10 min.     Meds  given: epogen 4000u   Complications: none      Person educated: patient. Knowledge base: minimal. Barriers to learning: none. Educated on fluid management via verbal mode. Patient verbalized understanding. Pt prefers verbal education style.     ICEBOAT? Timeout performed pre-treatment  I: Patient was identified using 2 identifiers  C:  Consent Signed Yes  E: Equipment preventative maintenance is current and dialysis delivery system OK to use  B: Hepatitis B Surface Antigen: Negative; Draw Date: 11/11/2021      Hepatitis B Surface Antibody: Susceptible; Draw Date: 6/17/2021  O: Dialysis orders present and complete prior to treatment  A: Vascular access verified and assessed prior  to treatment  T: Treatment was performed at a clinically appropriate time  ?: Patient was allowed to ask questions and address concerns prior to treatment  See flowsheet in EPIC for further details and post assessment.  Machine water alarm in place and functioning. Transducer pods intact and checked every 15min.   Pt returned via cart - admitted from ED.  Chlorine/Chloramine water system checked every 4 hours.  Outpatient Dialysis at [facility]    Please remove patient dressing on AVF and AVG needle sites 24 hours after dialysis. If leaking occurs please apply a Band-Aid.

## 2021-11-24 ENCOUNTER — APPOINTMENT (OUTPATIENT)
Dept: PHYSICAL THERAPY | Facility: CLINIC | Age: 71
DRG: 193 | End: 2021-11-24
Attending: NURSE PRACTITIONER
Payer: MEDICARE

## 2021-11-24 ENCOUNTER — APPOINTMENT (OUTPATIENT)
Dept: CARDIOLOGY | Facility: CLINIC | Age: 71
DRG: 193 | End: 2021-11-24
Attending: NURSE PRACTITIONER
Payer: MEDICARE

## 2021-11-24 LAB
ANION GAP SERPL CALCULATED.3IONS-SCNC: 6 MMOL/L (ref 3–14)
BASOPHILS # BLD AUTO: 0 10E3/UL (ref 0–0.2)
BASOPHILS NFR BLD AUTO: 0 %
BUN SERPL-MCNC: 37 MG/DL (ref 7–30)
CALCIUM SERPL-MCNC: 8.6 MG/DL (ref 8.5–10.1)
CHLORIDE BLD-SCNC: 106 MMOL/L (ref 94–109)
CO2 SERPL-SCNC: 31 MMOL/L (ref 20–32)
CREAT SERPL-MCNC: 4.39 MG/DL (ref 0.52–1.04)
EOSINOPHIL # BLD AUTO: 0.2 10E3/UL (ref 0–0.7)
EOSINOPHIL NFR BLD AUTO: 2 %
ERYTHROCYTE [DISTWIDTH] IN BLOOD BY AUTOMATED COUNT: 12.5 % (ref 10–15)
GFR SERPL CREATININE-BSD FRML MDRD: 9 ML/MIN/1.73M2
GLUCOSE BLD-MCNC: 78 MG/DL (ref 70–99)
GLUCOSE BLDC GLUCOMTR-MCNC: 100 MG/DL (ref 70–99)
GLUCOSE BLDC GLUCOMTR-MCNC: 125 MG/DL (ref 70–99)
GLUCOSE BLDC GLUCOMTR-MCNC: 154 MG/DL (ref 70–99)
GLUCOSE BLDC GLUCOMTR-MCNC: 87 MG/DL (ref 70–99)
GLUCOSE BLDC GLUCOMTR-MCNC: 99 MG/DL (ref 70–99)
HCT VFR BLD AUTO: 29.7 % (ref 35–47)
HGB BLD-MCNC: 9.2 G/DL (ref 11.7–15.7)
IMM GRANULOCYTES # BLD: 0 10E3/UL
IMM GRANULOCYTES NFR BLD: 1 %
LVEF ECHO: NORMAL
LYMPHOCYTES # BLD AUTO: 1.6 10E3/UL (ref 0.8–5.3)
LYMPHOCYTES NFR BLD AUTO: 23 %
MCH RBC QN AUTO: 32.1 PG (ref 26.5–33)
MCHC RBC AUTO-ENTMCNC: 31 G/DL (ref 31.5–36.5)
MCV RBC AUTO: 104 FL (ref 78–100)
MONOCYTES # BLD AUTO: 1.1 10E3/UL (ref 0–1.3)
MONOCYTES NFR BLD AUTO: 15 %
NEUTROPHILS # BLD AUTO: 4.1 10E3/UL (ref 1.6–8.3)
NEUTROPHILS NFR BLD AUTO: 59 %
NRBC # BLD AUTO: 0 10E3/UL
NRBC BLD AUTO-RTO: 0 /100
NT-PROBNP SERPL-MCNC: ABNORMAL PG/ML (ref 0–900)
PLATELET # BLD AUTO: 145 10E3/UL (ref 150–450)
POTASSIUM BLD-SCNC: 3.8 MMOL/L (ref 3.4–5.3)
RBC # BLD AUTO: 2.87 10E6/UL (ref 3.8–5.2)
SODIUM SERPL-SCNC: 143 MMOL/L (ref 133–144)
WBC # BLD AUTO: 7 10E3/UL (ref 4–11)

## 2021-11-24 PROCEDURE — 93306 TTE W/DOPPLER COMPLETE: CPT | Mod: 26 | Performed by: INTERNAL MEDICINE

## 2021-11-24 PROCEDURE — 85025 COMPLETE CBC W/AUTO DIFF WBC: CPT | Performed by: NURSE PRACTITIONER

## 2021-11-24 PROCEDURE — 258N000003 HC RX IP 258 OP 636: Performed by: NURSE PRACTITIONER

## 2021-11-24 PROCEDURE — 97161 PT EVAL LOW COMPLEX 20 MIN: CPT | Mod: GP

## 2021-11-24 PROCEDURE — 250N000011 HC RX IP 250 OP 636: Performed by: INTERNAL MEDICINE

## 2021-11-24 PROCEDURE — 250N000011 HC RX IP 250 OP 636: Performed by: NURSE PRACTITIONER

## 2021-11-24 PROCEDURE — 99232 SBSQ HOSP IP/OBS MODERATE 35: CPT | Performed by: INTERNAL MEDICINE

## 2021-11-24 PROCEDURE — 93306 TTE W/DOPPLER COMPLETE: CPT

## 2021-11-24 PROCEDURE — 97110 THERAPEUTIC EXERCISES: CPT | Mod: GP

## 2021-11-24 PROCEDURE — 80048 BASIC METABOLIC PNL TOTAL CA: CPT | Performed by: NURSE PRACTITIONER

## 2021-11-24 PROCEDURE — 86706 HEP B SURFACE ANTIBODY: CPT | Performed by: INTERNAL MEDICINE

## 2021-11-24 PROCEDURE — 83880 ASSAY OF NATRIURETIC PEPTIDE: CPT | Performed by: NURSE PRACTITIONER

## 2021-11-24 PROCEDURE — 97530 THERAPEUTIC ACTIVITIES: CPT | Mod: GP

## 2021-11-24 PROCEDURE — 36415 COLL VENOUS BLD VENIPUNCTURE: CPT | Performed by: NURSE PRACTITIONER

## 2021-11-24 PROCEDURE — 250N000013 HC RX MED GY IP 250 OP 250 PS 637: Performed by: NURSE PRACTITIONER

## 2021-11-24 PROCEDURE — 36415 COLL VENOUS BLD VENIPUNCTURE: CPT | Performed by: INTERNAL MEDICINE

## 2021-11-24 PROCEDURE — 87340 HEPATITIS B SURFACE AG IA: CPT | Performed by: INTERNAL MEDICINE

## 2021-11-24 PROCEDURE — 120N000004 HC R&B MS OVERFLOW

## 2021-11-24 RX ORDER — HEPARIN SODIUM 5000 [USP'U]/.5ML
5000 INJECTION, SOLUTION INTRAVENOUS; SUBCUTANEOUS EVERY 12 HOURS
Status: DISCONTINUED | OUTPATIENT
Start: 2021-11-24 | End: 2021-11-26 | Stop reason: HOSPADM

## 2021-11-24 RX ADMIN — SEVELAMER CARBONATE 800 MG: 800 TABLET, FILM COATED ORAL at 18:13

## 2021-11-24 RX ADMIN — TRIAMCINOLONE ACETONIDE: 0.25 OINTMENT TOPICAL at 10:43

## 2021-11-24 RX ADMIN — AZITHROMYCIN MONOHYDRATE 250 MG: 500 INJECTION, POWDER, LYOPHILIZED, FOR SOLUTION INTRAVENOUS at 22:08

## 2021-11-24 RX ADMIN — AMLODIPINE BESYLATE 5 MG: 5 TABLET ORAL at 10:38

## 2021-11-24 RX ADMIN — ATORVASTATIN CALCIUM 20 MG: 20 TABLET, FILM COATED ORAL at 10:37

## 2021-11-24 RX ADMIN — TRIAMCINOLONE ACETONIDE: 0.25 OINTMENT TOPICAL at 21:56

## 2021-11-24 RX ADMIN — SEVELAMER CARBONATE 800 MG: 800 TABLET, FILM COATED ORAL at 12:56

## 2021-11-24 RX ADMIN — INSULIN ASPART 1 UNITS: 100 INJECTION, SOLUTION INTRAVENOUS; SUBCUTANEOUS at 18:13

## 2021-11-24 RX ADMIN — SEVELAMER CARBONATE 800 MG: 800 TABLET, FILM COATED ORAL at 10:37

## 2021-11-24 RX ADMIN — CARVEDILOL 25 MG: 12.5 TABLET, FILM COATED ORAL at 18:13

## 2021-11-24 RX ADMIN — AMLODIPINE BESYLATE 5 MG: 5 TABLET ORAL at 19:53

## 2021-11-24 RX ADMIN — HEPARIN SODIUM 5000 UNITS: 5000 INJECTION, SOLUTION INTRAVENOUS; SUBCUTANEOUS at 19:53

## 2021-11-24 RX ADMIN — CARVEDILOL 25 MG: 12.5 TABLET, FILM COATED ORAL at 10:37

## 2021-11-24 RX ADMIN — GABAPENTIN 100 MG: 100 CAPSULE ORAL at 06:32

## 2021-11-24 RX ADMIN — ACETAMINOPHEN 650 MG: 325 TABLET, FILM COATED ORAL at 19:53

## 2021-11-24 ASSESSMENT — ACTIVITIES OF DAILY LIVING (ADL)
ADLS_ACUITY_SCORE: 18
ADLS_ACUITY_SCORE: 16
ADLS_ACUITY_SCORE: 18
ADLS_ACUITY_SCORE: 16
ADLS_ACUITY_SCORE: 18
ADLS_ACUITY_SCORE: 16
ADLS_ACUITY_SCORE: 16
ADLS_ACUITY_SCORE: 18
ADLS_ACUITY_SCORE: 16
ADLS_ACUITY_SCORE: 18
ADLS_ACUITY_SCORE: 18
ADLS_ACUITY_SCORE: 16
ADLS_ACUITY_SCORE: 16
ADLS_ACUITY_SCORE: 18
ADLS_ACUITY_SCORE: 16

## 2021-11-24 NOTE — CONSULTS
Care Management Initial Consult    General Information  Assessment completed with:  ,    Primary Care Provider verified and updated as needed: Yes ([facility] PCP Dr. Jackson [phone], has [facility])   Readmission within the last 30 days:   Yes, re-presented with worsening symptoms after being discharged from ER     Reason for Consult: discharge planning  Advance Care Planning:    POLST on file in [facility] dated 04/17/18  General Information Comments: Medicare - [place] [phone]  //  [facility] phone [phone],  Fax: [phone]    Communication Assessment  Patient's communication style: spoken language (English or Bilingual)    Hearing Difficulty or Deaf: no   Wear Glasses or Blind: no    Cognitive  Cognitive/Neuro/Behavioral: .WDL except,motor response,speech  Level of Consciousness: alert  Arousal Level: opens eyes spontaneously  Orientation: oriented x 4  Mood/Behavior: calm,cooperative  Best Language: 0 - No aphasia  Speech: word-finding difficulty    Living Environment:   People in home: alone (daughter lives in attached unit )     Current living Arrangements: house (duplex - pt on the main level, daughter upstairs)      Able to return to prior arrangements: yes  Living Arrangement Comments: Dannielle WARE has been working with PCP clinic and [facility] to get a hospital bed for pt at home    Family/Social Support:  Care provided by: homecare agency,self,child(dominick)  Provides care for: no one  Marital Status:   Support system: Children (homecare agency )          Description of Support System: Supportive,Involved      Current Resources:   Patient receiving home care services:       Community Resources:    Equipment currently used at home: wheelchair, manual  Supplies currently used at home:      Employment/Financial:  Employment Status:    retired/disabled   Financial Concerns:   n/a   Finance Comments: active MEDICARE/MEDICARE and MEDICA/MEDICA CHOICE CARE [employer] PL[employer] insurance  "    Lifestyle & Psychosocial Needs:  Outpatient \"social determinants of health\" assessment not done.     Functional Status:  Assesssment of Functional Status:  (see therapy assessment )    Mental Health Status:  Mental Health Status: No Current Concerns       Chemical Dependency Status:  Chemical Dependency Status: No Current Concerns       Values/Beliefs:  Spiritual, Cultural Beliefs, Restoration Practices, Values that affect care: no          Values/Beliefs Comment: Bimal    Additional Information:  CM consult in progress, above via chart review and call from [facility] CM.     1515 Met with patient in room, introduced self and role in discharge planning, and shared anticipated discharge date noted by hospitalist of 11/25.   Patient confirmed the information above, adding that she has two cats.  She shared her outpatient dialysis plan for this week was to have dialysis on Wednesday instead of Thursday due to the holiday.  She is wondering if she would have dialysis here before she goes.  [facility]-RN paged nephrology who states if pt is in the hospital tomorrow 11/25 she will dialyze before discharge.  Pt requested I share this with pt daughter, whom I called.  Daughter confirms she will take pt home but notes pt is sometimes weaker after dialysis.      CHAZ-RN recommends staff ensure pt can perform safe transfer from bed to chair with SBAx1 prior to discharging pt to daughter's vehicle.        Alina Toney RN, BSN, PHN  [facility]  Inpatient Care Management - FLOAT  Mobile: [phone] 11/24/21 until 4pm  (after today's date, please call the patient's unit)         "

## 2021-11-24 NOTE — PROVIDER NOTIFICATION
"Per hospitalist, \"Anticipated discharge 11/25/21 if stable off of oxygen.\"  Met with pt for CM consult; pt has a question re: dialysis plan.    Paged Nephrology,   Pt wondering if she can either skip Thursday dialysis (next is Saturday) or if she will have it at the hospital before she discharges 11/25.  She says she doesn't like IP dialysis because she doesn't like that they don't use the numbing spray on her arm.  Per Dr. Oneill note 11/23 \"Usually runs T Th S.\"     Dr. Oneill clarified: pt had Dialysis yesterday 11/23 and will have it tomorrow 11/25 here at the hospital prior to discharge.   "

## 2021-11-24 NOTE — PROGRESS NOTES
Patient refusing lasix at this time, says she does not have her wheelchair and does not want to get up constantly to go to bathroom without her wheelchair. She denies SOB at this time. PT currently working with patient.

## 2021-11-24 NOTE — PROGRESS NOTES
Observation Goals    Diagnostic tests and consults completed and resulted   -not met; echo not yet completed    Vital signs normal or at patient baseline   -not met; temp 100.2    Tolerating oral intake to maintain hydration   -met    Safe disposition plan has been identified   -not met

## 2021-11-24 NOTE — PLAN OF CARE
OT: orders received and chart reviewed. Per PT, pt at/near  functional baseline and no IP OT needs identified. Will complete OT order.

## 2021-11-24 NOTE — PROGRESS NOTES
Observation Goals    Diagnostic tests and consults completed and resulted   -not met; echo not yet completed    Vital signs normal or at patient baseline   met    Tolerating oral intake to maintain hydration   -met    Safe disposition plan has been identified   -not met

## 2021-11-24 NOTE — PROGRESS NOTES
MD Notification    Notified Person: MD    Notified Person Name: Dr. Rodríguez    Notification Date/Time: 11/24/21, 2:52 AM    Notification Interaction: Amcom    Purpose of Notification: Patient admitted with hyperkalemia, H&P says admit w/ tele but no order, asked for order for tele monitoring    Orders Received: telemetry    Comments:

## 2021-11-24 NOTE — PLAN OF CARE
0835 - [id number]    Cognition/Mentation: Alert, oriented x4 but some word-finding difficulty    Neuro/CMS: Right-sided numbness which patient reports is baseline; RLE weakness; infrequent hand tremors; neuro otherwise intact    VS: Temp 100.2; patient given PRN APAP, recheck 98.4' 2L O2 placed overnight as patient's O2 sats dropped to 87% on RA; O2 sats mid-90s w/ O2    Respiratory: LS diminished, DEVRIES    Activity: patient shifts weight independently in bed    Pain: PRN gabapentin for neuropathy pain    Other: echo ordered

## 2021-11-24 NOTE — PROGRESS NOTES
[facility]    Medicine Progress Note - Hospitalist Service       Date of Admission:  11/23/2021  Assessment & Plan   Supriya Herr is a 71 year old female PMH including DMII, CKD, JONE, chronic diastolic CHF, PVD,  s/p traumatic left AKA, psoriatic arthritis, ESRD on HD (T/Th/Sat), hx of COVID19 infection 8/2021, among other chronic comorbidities who presents to the ED with worsening shortness of breath/cough. Admitted 11/23/2021.      Acute hypoxic respiratory failure, multifactorial  Suspect secondary to combination of pneumonia and volume overload  - Manage separate issues as below  - Oxygen as needed, wean as tolerated    Right lower lobe pneumonia  Chest x-ray with right basilar opacities.  Suspect presence of pneumonia given report of new productive cough, borderline fever.  White blood cell count normal. COVID19/Influenza A&B negative.   - Continue ceftriaxone + azithromycin      ESRD on HD   Acute hyperkalemia  Suspected to have component of volume overload contributing to symptoms. Reports improvement following HD. Potassium 6.6, treated with D50, insulin, albuterol and calcium in ED.  - Nephrology following, HD per nephrology  - Potassium normalized 11/24  - Continue prior to admission sevelamer   - Daily weights      Acute metabolic encephalopathy   Patient is somewhat inconsistent and confused on exam, had some hallucinations overnight per daughter. No focal deficits.  Suspect multifactorial with possible uremia and pneumonia contributing  - Improved 11/24  - Re-orient as needed  - Maintain normal day/night, sleep wake cycles  - Minimize sedating/altering medications as able  - Treat separate conditions as detailed above/below      Chronic diastolic CHF  By history; most recent TTE in 2018 showed preserved LVEF 60-65%. Lexiscan in 2018 reportedly was negative for inducible ischemia. She reports chronic dyspnea. Spirometry in 2018 was reportedly normal.  proBNP significantly  elevated, though difficult to interpret in setting of ESRD/HD.  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 On Basaglar and Aspart as an outpatient.  Most recent hemoglobin A1c 6.2% 4/2021.  Not on aspirin due to history of epistaxis.  Daughter notes she had some acute hyperglycemia approximately 1 month ago, prompting evaluation with endocrine and her new sara monitor placed in her arm, due to issues with capillary glucometer checks.      Diabetes mellitus, type 2, on long term insulin with retinopathy, peripheral neuropathy  History of right foot osteomyelitis with nonhealing wound  HgbA1c 6.2% 11/23/21. Prior to admission on glargine 18 units at bedtime, aspart 5 units 3 times daily with meals.  - Blood sugars low-normal  - Decrease glargine to 14 units to reduce hypoglycemic risk  - Medium sliding scale insulin   - Hold oral medications while hospitalized  - Hypoglycemia protocol     Hypertension  - Continue prior to admission amlodipine, carvedilol    Hyperlipidemia  - Continue prior to admission atorvastatin     Psoriasis and psoriatic arthritis  Continue prior to admission Aprelimast with home supply (not stocked)     JONE  Not currently on CPAP. Monitor sats closely while sleeping     Chronic anemia due to renal disease and iron deficiency  Hgb 10.0 on admission, baseline 10-11.      Depression  Anxiety  Any prior to admission sertraline     Hx traumatic amputation of LLE (1989)         Clinically Significant Risk Factors Present on Admission                  Diet: Combination Diet Dialysis Diet    DVT Prophylaxis: Heparin SQ  Bradshaw Catheter: Not present  Code Status: Full Code      Disposition Plan   Expected discharge: Anticipated discharge 11/25/21 if stable off of oxygen  Entered: Yogesh Wells MD 11/24/2021, 11:53 AM       The  patient's care was discussed with the patient    Yogesh Wells MD  Hospitalist Service  [facility]    ______________________________________________________________________    Interval History   No acute events overnight.  Reports her breathing feels improved following dialysis.  Reports prior to admission she had a cough that was productive of yellow, then green sputum which is atypical for her.  Tolerating her diet.  Denies any chest pain, abdominal pain, nausea, vomiting.    Data reviewed today: I reviewed all medications, new labs and imaging results over the last 24 hours. I personally reviewed no images or EKG's today.    Physical Exam   Vital Signs: Temp: 98.5  F (36.9  C) Temp src: Oral BP: 137/59 Pulse: 66   Resp: 16 SpO2: 90 % O2 Device: None (Room air) Oxygen Delivery: 2 LPM  Weight: 220 lbs 0 oz    Constitutional:  NAD  Respiratory: Right basilar inspiratory crackles  Cardiovascular: RRR . No peripheral edema.  GI: Soft, non-tender, non-distended.   Skin/Integumen: Warm, dry  Neuro: Alert.  Oriented to person, place, date.  Following commands.    Data   Recent Labs   Lab 11/24/21  0658 11/24/21  0627 11/24/21  0348 11/23/21  1424 11/23/21  0634 11/22/21  2312 11/22/21 2003 11/22/21  1803   WBC 7.0  --   --   --  10.1  --   --  10.9   HGB 9.2*  --   --   --  10.0*  --   --  10.2*   *  --   --   --  105*  --   --  105*   *  --   --   --  184  --   --  181     --   --   --  139  --   --  141   POTASSIUM 3.8  --   --   --  6.6* 5.5*   < > 6.4*   CHLORIDE 106  --   --   --  109  --   --  110*   CO2 31  --   --   --  23  --   --  24   BUN 37*  --   --   --  69*  --   --  64*   CR 4.39*  --   --   --  6.43*  --   --  5.82*   ANIONGAP 6  --   --   --  7  --   --  7   JODY 8.6  --   --   --  9.3  --   --  9.3   GLC 78 100* 87   < > 94  --    < > 88   ALBUMIN  --   --   --   --   --   --   --  3.1*   PROTTOTAL  --   --   --   --   --   --   --  7.1   BILITOTAL  --    --   --   --   --   --   --  0.3   ALKPHOS  --   --   --   --   --   --   --  121   ALT  --   --   --   --   --   --   --  20   AST  --   --   --   --   --   --   --  15    < > = values in this interval not displayed.       No results found for this or any previous visit (from the past 24 hour(s)).  Medications       - MEDICATION INSTRUCTIONS for Dialysis Patients -   Does not apply See Admin Instructions     amLODIPine  5 mg Oral BID     apremilast  30 mg Oral Daily     atorvastatin  20 mg Oral Daily     azithromycin  250 mg Intravenous Q24H     carvedilol  25 mg Oral BID w/meals     cefTRIAXone  2 g Intravenous Q24H     furosemide  80 mg Oral BID     insulin aspart  1-7 Units Subcutaneous TID AC     insulin aspart  1-5 Units Subcutaneous At Bedtime     insulin glargine  18 Units Subcutaneous At Bedtime     sertraline  50 mg Oral At Bedtime     sevelamer carbonate  800 mg Oral TID w/meals     sodium chloride (PF)  3 mL Intracatheter Q8H     triamcinolone   Topical BID

## 2021-11-24 NOTE — PROGRESS NOTES
11/24/21 1020   Quick Adds   Type of Visit Initial PT Evaluation   Living Environment   People in home child(dominick), adult  (daughter and son in law)   Current Living Arrangements house  (duplex - pt on the main level, daughter upstairs)   Home Accessibility no concerns;wheelchair accessible   Self-Care   Usual Activity Tolerance fair   Current Activity Tolerance fair   Regular Exercise Yes   Activity/Exercise Type strength training;other (see comments)  (LE and UE ROM and strengthening exercises)   Exercise Amount/Frequency daily   Equipment Currently Used at Home wheelchair, manual   Activity/Exercise/Self-Care Comment Pt pivot transfers with wc directly in front of her, no AD use, does not ambulate and no prosthesis used. States she is independent with dressing, getting into the shower and bathing herself but has supervision for getting out of the shower. Pt does not drive and family assists with cleaning and cooking.   Disability/Function   Fall history within last six months no   General Information   Onset of Illness/Injury or Date of Surgery 11/23/21   Referring Physician Celine Damon CNP   Patient/Family Therapy Goals Statement (PT) Return home   Pertinent History of Current Problem (include personal factors and/or comorbidities that impact the POC) Pt admitted with SOB, acute hypoxic respiratory failure, AMS, pneumonia, volume overload, hypercalcemia. PMH: ESRD with dialysis, CHF, JONE, DM2, PVD, L AKA, recent ED visit with hypercalcemia and pneumonia.   Existing Precautions/Restrictions fall   Weight-Bearing Status - LLE full weight-bearing   Weight-Bearing Status - RLE full weight-bearing   Cognition   Orientation Status (Cognition) oriented x 3   Affect/Mental Status (Cognition) WFL   Follows Commands (Cognition) WFL   Pain Assessment   Patient Currently in Pain Yes, see Vital Sign flowsheet  (B hand pain)   Posture    Posture Forward head position;Protracted shoulders   Range of Motion (ROM)   ROM Quick  Adds ROM WFL   Strength   Strength Comments R hip flex 4/5, knee ext 5/5, DF 4+/5   Bed Mobility   Comment (Bed Mobility) Supine to sit independent   Transfers   Transfer Safety Comments Sit to stand and pivot transfer to recliner with SBA only after chair was set up for her.   Gait/Stairs (Locomotion)   Comment (Gait/Stairs) NA   Balance   Balance Comments Stable balance with transfer   Clinical Impression   Criteria for Skilled Therapeutic Intervention yes, treatment indicated   PT Diagnosis (PT) Difficulty transferring   Influenced by the following impairments Dec strength, SOB, activity tolerance   Functional limitations due to impairments Difficulty transferring   Clinical Presentation Stable/Uncomplicated   Clinical Presentation Rationale medically improved   Clinical Decision Making (Complexity) low complexity   Therapy Frequency (PT) 3x/week   Predicted Duration of Therapy Intervention (days/wks) 1 week   Planned Therapy Interventions (PT) bed mobility training;strengthening;patient/family education;transfer training;wheelchair management/propulsion training   Risk & Benefits of therapy have been explained evaluation/treatment results reviewed;care plan/treatment goals reviewed;risks/benefits reviewed;current/potential barriers reviewed;participants voiced agreement with care plan   PT Discharge Planning    PT Discharge Recommendation (DC Rec) home with assist   PT Rationale for DC Rec Pt will be safe to return home with her daughter with assist as prior for cleaning, meals, etc.   Total Evaluation Time   Total Evaluation Time (Minutes) 15

## 2021-11-24 NOTE — PLAN OF CARE
Inpatient. AOX4. VSS on room air, patient was on O2 last night but has been room air all morning.Tele NSR. C/O some pain in right arm/right leg with neuropathy. Up in room SBA with pivot up to chair or bedside commode. Patient has left above knee amputation. Refusing lasix at this time because patient is wheelchair baseline and does not have her wheelchair. Tolerating diaylsis diet. 2 fistulas present bilateral arms. ECHO done this shift. Coughing intermittently. Dyspnea with exertion.  PIV SL w/ intermittent IV antibiotics. Plan for discharge pending. Social work/ CC following, per CC looks like patient lives at home alone in duplex with daughter.

## 2021-11-24 NOTE — PLAN OF CARE
Alert and oriented x4, VSS on room air. Left AKA. Lift assist. PIV saline locked. Scheduled med's given. Refused furosemide. Blood sugar @ 2200 is 225, 1 unit insulin given. + BM this shift. Dialysis run today. Continue to monitor.

## 2021-11-25 LAB
GLUCOSE BLDC GLUCOMTR-MCNC: 114 MG/DL (ref 70–99)
GLUCOSE BLDC GLUCOMTR-MCNC: 138 MG/DL (ref 70–99)
GLUCOSE BLDC GLUCOMTR-MCNC: 163 MG/DL (ref 70–99)
GLUCOSE BLDC GLUCOMTR-MCNC: 213 MG/DL (ref 70–99)
GLUCOSE BLDC GLUCOMTR-MCNC: 235 MG/DL (ref 70–99)

## 2021-11-25 PROCEDURE — 250N000011 HC RX IP 250 OP 636: Performed by: INTERNAL MEDICINE

## 2021-11-25 PROCEDURE — 258N000003 HC RX IP 258 OP 636: Performed by: NURSE PRACTITIONER

## 2021-11-25 PROCEDURE — 258N000003 HC RX IP 258 OP 636: Performed by: INTERNAL MEDICINE

## 2021-11-25 PROCEDURE — 250N000013 HC RX MED GY IP 250 OP 250 PS 637: Performed by: NURSE PRACTITIONER

## 2021-11-25 PROCEDURE — 634N000001 HC RX 634: Performed by: INTERNAL MEDICINE

## 2021-11-25 PROCEDURE — 90937 HEMODIALYSIS REPEATED EVAL: CPT

## 2021-11-25 PROCEDURE — 120N000004 HC R&B MS OVERFLOW

## 2021-11-25 PROCEDURE — 90935 HEMODIALYSIS ONE EVALUATION: CPT | Performed by: INTERNAL MEDICINE

## 2021-11-25 PROCEDURE — 250N000011 HC RX IP 250 OP 636: Performed by: NURSE PRACTITIONER

## 2021-11-25 PROCEDURE — 99232 SBSQ HOSP IP/OBS MODERATE 35: CPT | Performed by: INTERNAL MEDICINE

## 2021-11-25 RX ORDER — DOXERCALCIFEROL 4 UG/2ML
4 INJECTION INTRAVENOUS
Status: COMPLETED | OUTPATIENT
Start: 2021-11-25 | End: 2021-11-25

## 2021-11-25 RX ORDER — ALBUMIN (HUMAN) 12.5 G/50ML
50 SOLUTION INTRAVENOUS
Status: DISCONTINUED | OUTPATIENT
Start: 2021-11-25 | End: 2021-11-25

## 2021-11-25 RX ORDER — HEPARIN SODIUM 1000 [USP'U]/ML
500 INJECTION, SOLUTION INTRAVENOUS; SUBCUTANEOUS CONTINUOUS
Status: DISCONTINUED | OUTPATIENT
Start: 2021-11-25 | End: 2021-11-25

## 2021-11-25 RX ADMIN — CARVEDILOL 25 MG: 12.5 TABLET, FILM COATED ORAL at 19:00

## 2021-11-25 RX ADMIN — SODIUM CHLORIDE 250 ML: 9 INJECTION, SOLUTION INTRAVENOUS at 09:49

## 2021-11-25 RX ADMIN — ACETAMINOPHEN 650 MG: 325 TABLET, FILM COATED ORAL at 19:00

## 2021-11-25 RX ADMIN — INSULIN ASPART 2 UNITS: 100 INJECTION, SOLUTION INTRAVENOUS; SUBCUTANEOUS at 19:04

## 2021-11-25 RX ADMIN — EPOETIN ALFA-EPBX 4000 UNITS: 4000 INJECTION, SOLUTION INTRAVENOUS; SUBCUTANEOUS at 09:47

## 2021-11-25 RX ADMIN — SERTRALINE HYDROCHLORIDE 50 MG: 50 TABLET ORAL at 21:32

## 2021-11-25 RX ADMIN — CEFTRIAXONE SODIUM 2 G: 2 INJECTION, POWDER, FOR SOLUTION INTRAMUSCULAR; INTRAVENOUS at 14:59

## 2021-11-25 RX ADMIN — HEPARIN SODIUM 5000 UNITS: 5000 INJECTION, SOLUTION INTRAVENOUS; SUBCUTANEOUS at 08:31

## 2021-11-25 RX ADMIN — HEPARIN SODIUM 5000 UNITS: 5000 INJECTION, SOLUTION INTRAVENOUS; SUBCUTANEOUS at 21:33

## 2021-11-25 RX ADMIN — SODIUM CHLORIDE 300 ML: 9 INJECTION, SOLUTION INTRAVENOUS at 09:46

## 2021-11-25 RX ADMIN — HEPARIN SODIUM 500 UNITS/HR: 1000 INJECTION INTRAVENOUS; SUBCUTANEOUS at 09:48

## 2021-11-25 RX ADMIN — INSULIN ASPART 1 UNITS: 100 INJECTION, SOLUTION INTRAVENOUS; SUBCUTANEOUS at 15:08

## 2021-11-25 RX ADMIN — DOXERCALCIFEROL 4 MCG: 4 INJECTION, SOLUTION INTRAVENOUS at 09:47

## 2021-11-25 RX ADMIN — ATORVASTATIN CALCIUM 20 MG: 20 TABLET, FILM COATED ORAL at 08:31

## 2021-11-25 RX ADMIN — HEPARIN SODIUM 500 UNITS: 1000 INJECTION INTRAVENOUS; SUBCUTANEOUS at 09:47

## 2021-11-25 RX ADMIN — FUROSEMIDE 80 MG: 40 TABLET ORAL at 21:32

## 2021-11-25 RX ADMIN — AMLODIPINE BESYLATE 5 MG: 5 TABLET ORAL at 21:32

## 2021-11-25 RX ADMIN — TRIAMCINOLONE ACETONIDE: 0.25 OINTMENT TOPICAL at 08:31

## 2021-11-25 RX ADMIN — AZITHROMYCIN MONOHYDRATE 250 MG: 500 INJECTION, POWDER, LYOPHILIZED, FOR SOLUTION INTRAVENOUS at 23:39

## 2021-11-25 RX ADMIN — SEVELAMER CARBONATE 800 MG: 800 TABLET, FILM COATED ORAL at 14:59

## 2021-11-25 RX ADMIN — SEVELAMER CARBONATE 800 MG: 800 TABLET, FILM COATED ORAL at 19:00

## 2021-11-25 RX ADMIN — Medication: at 09:48

## 2021-11-25 ASSESSMENT — ACTIVITIES OF DAILY LIVING (ADL)
ADLS_ACUITY_SCORE: 16

## 2021-11-25 ASSESSMENT — MIFFLIN-ST. JEOR: SCORE: 1472.05

## 2021-11-25 NOTE — PROGRESS NOTES
[facility]    Medicine Progress Note - Hospitalist Service        Date of Admission:  11/23/2021  6:21 AM    Assessment & Plan:   Supriya Herr is a 71 year old female PMH including DMII, CKD, JONE, chronic diastolic CHF, PVD,  s/p traumatic left AKA, psoriatic arthritis, ESRD on HD (T/Th/Sat), hx of COVID19 infection 8/2021, among other chronic comorbidities who presents to the ED with worsening shortness of breath/cough. Admitted 11/23/2021.      Acute hypoxic respiratory failure, multifactorial  Suspect secondary to combination of pneumonia and volume overload  -Manage separate issues as below  -Initially was hypoxic, weaned off oxygen this morning.  Continue to monitor.     Right lower lobe pneumonia  Chest x-ray with right basilar opacities.  Suspect presence of pneumonia given report of new productive cough, borderline fever.  White blood cell count normal. COVID19/Influenza A&B negative.   -Continue ceftriaxone + azithromycin      ESRD on HD   Acute hyperkalemia  Suspected to have component of volume overload contributing to symptoms. Reports improvement following HD. Potassium 6.6, treated with D50, insulin, albuterol and calcium in ED.  - Nephrology following, HD per nephrology  - Potassium normalized 11/24  - Continue prior to admission sevelamer   - Daily weights      Acute metabolic encephalopathy   Patient is somewhat inconsistent and confused on exam, had some hallucinations overnight per daughter. No focal deficits.  Suspect multifactorial with possible uremia and pneumonia contributing  - Improved   - Re-orient as needed  - Maintain normal day/night, sleep wake cycles  - Minimize sedating/altering medications as able  - Treat separate conditions as detailed above/below      Chronic diastolic CHF  By history; most recent TTE in 2018 showed preserved LVEF 60-65%. Lexiscan in 2018 reportedly was negative for inducible ischemia. She reports chronic dyspnea. Spirometry in 2018 was  reportedly normal.  proBNP significantly elevated, though difficult to interpret in setting of ESRD/HD.  - TTE with normal EF at 60-65%, no wall motion abnormalities.  No significant valvular pathology.  - Continue prior to admission furosemide      Diabetes mellitus, type 2, on long term insulin with retinopathy, peripheral neuropathy  History of right foot osteomyelitis with nonhealing wound  HgbA1c 6.2% 11/23/21. Prior to admission on glargine 18 units at bedtime, aspart 5 units 3 times daily with meals.  - Blood sugars adequately controlled at the moment.  - Continue glargine at 14 units  - Medium sliding scale insulin   - Hypoglycemia protocol     Hypertension  - Continue prior to admission amlodipine, carvedilol     Hyperlipidemia  - Continue prior to admission atorvastatin     Psoriasis and psoriatic arthritis  - Continue prior to admission Aprelimast with home supply (not stocked)     JONE  - Not currently on CPAP. Monitor sats closely while sleeping     Chronic anemia due to renal disease and iron deficiency  -Hgb 10.0 on admission, baseline 10-11.      Hx traumatic amputation of LLE (1989)    Diet: Combination Diet Dialysis Diet     DVT Prophylaxis: Pneumatic Compression Devices   Bradshaw Catheter: Not present  Code Status: Full Code     Disposition Plan    Expected discharge: Patient very reluctant about discharge today.  Anticipate home with home services 11/26.  Daughter would like to come to the hospital today to assess her mother's strength and stamina  Entered: Travis Hernandez MD 11/25/2021, 11:01 AM        The patient's care was discussed with the Bedside Nurse, Patient and Daughter Caroline on the phone..    Travis Hernandez MD  Hospitalist Service  [facility]  Text Page 7AM-6PM  Securely message with the Vocera Web Console (learn more here)  Text page via [employer] Paging/Directory    ______________________________________________________________________    Interval History  "  Patient feels like she is not ready for discharge.  Dyspnea improving.  Off oxygen.  She got dialyzed earlier this morning without any new problems.  No nausea or vomiting.  Still feels quite weak and fatigued especially with activities.    Data reviewed today: I reviewed all medications, new labs and imaging results over the last 24 hours. I personally reviewed no images or EKG's today.    Physical Exam   Vital signs:  Temp: 98.1  F (36.7  C) Temp src: Oral BP: 127/67 Pulse: 72   Resp: 16 SpO2: 97 % O2 Device: Nasal cannula Oxygen Delivery: 2 LPM Height: 165.1 cm (5' 5\") Weight: 99.8 kg (220 lb)  Estimated body mass index is 36.61 kg/m  as calculated from the following:    Height as of this encounter: 1.651 m (5' 5\").    Weight as of this encounter: 99.8 kg (220 lb).      Wt Readings from Last 2 Encounters:   11/23/21 99.8 kg (220 lb)   11/22/21 94.3 kg (208 lb)       Gen: AAOX3, NAD  HEENT:  no pallor  Resp: Coarse breath sounds bilaterally, normal effort of breathing.  No active wheezing.  CVS: RRR, no murmur  Abd/GI: Soft, non-tender. BS- normoactive.  No G/R/R  Skin: Warm, dry no rashes  MSK: Left BKA, right lower extremity with trace edema  Neuro- CN- intact. No focal deficits.        Data   Recent Labs   Lab 11/25/21  0824 11/25/21  0236 11/24/21  2133 11/24/21  1218 11/24/21  0658 11/23/21  1424 11/23/21  0634 11/22/21  2312 11/22/21 2003 11/22/21  1803   WBC  --   --   --   --  7.0  --  10.1  --   --  10.9   HGB  --   --   --   --  9.2*  --  10.0*  --   --  10.2*   MCV  --   --   --   --  104*  --  105*  --   --  105*   PLT  --   --   --   --  145*  --  184  --   --  181   NA  --   --   --   --  143  --  139  --   --  141   POTASSIUM  --   --   --   --  3.8  --  6.6* 5.5*   < > 6.4*   CHLORIDE  --   --   --   --  106  --  109  --   --  110*   CO2  --   --   --   --  31  --  23  --   --  24   BUN  --   --   --   --  37*  --  69*  --   --  64*   CR  --   --   --   --  4.39*  --  6.43*  --   --  5.82* "   ANIONGAP  --   --   --   --  6  --  7  --   --  7   JODY  --   --   --   --  8.6  --  9.3  --   --  9.3   * 235* 125*   < > 78   < > 94  --    < > 88   ALBUMIN  --   --   --   --   --   --   --   --   --  3.1*   PROTTOTAL  --   --   --   --   --   --   --   --   --  7.1   BILITOTAL  --   --   --   --   --   --   --   --   --  0.3   ALKPHOS  --   --   --   --   --   --   --   --   --  121   ALT  --   --   --   --   --   --   --   --   --  20   AST  --   --   --   --   --   --   --   --   --  15    < > = values in this interval not displayed.       Recent Results (from the past 24 hour(s))   Echocardiogram Complete   Result Value    LVEF  60-65%    [facility]    [id number]  [id number]  [id number]  [id number]^JIL^KUN^DEBORAH     [facility]  Echocardiography Laboratory  [address] [place] [address], [place] [address]     Name: BROOKE ARANA  MRN: [id number]  : 1950  Study Date: 2021 11:07 AM  Age: 71 yrs  Gender: Female  Patient Location: [facility]  Reason For Study: Dyspnea, CHF  Ordering Physician: KUN LEY  Performed By: Celeste Masters     BSA: 2.1 m2  Height: 65 in  Weight: 220 lb  HR: 71  BP: 139/79 mmHg  ______________________________________________________________________________  Procedure  Complete Portable Echo Adult.  ______________________________________________________________________________  Interpretation Summary     1. Left ventricular systolic function is normal. The visual ejection fraction  is 60-65%.  2. No regional wall motion abnormalities noted.  3. The right ventricle is normal in structure, function and size.  4. The left atrium is moderately dilated.  5. No evidence for significant valvular pathology.  ______________________________________________________________________________  Left Ventricle  The left ventricle is normal in size. There is normal left ventricular wall  thickness. Left ventricular systolic function is normal. The visual ejection  fraction  is 60-65%. Left ventricular diastolic function is normal. No regional  wall motion abnormalities noted.     Right Ventricle  The right ventricle is normal in structure, function and size.     Atria  The left atrium is moderately dilated. Right atrial size is normal. There is  no color Doppler evidence of an atrial shunt.     Mitral Valve  There is mild mitral annular calcification.     Tricuspid Valve  The tricuspid valve is normal in structure and function.     Aortic Valve  The aortic valve is normal in structure and function.     Pulmonic Valve  The pulmonic valve is normal in structure and function.     Vessels  The ascending aorta is Borderline dilated. IVC diameter <2.1 cm collapsing  >50% with sniff suggests a normal RA pressure of 3 mmHg.     Pericardium  There is no pericardial effusion.     Rhythm  Sinus rhythm was noted.  ______________________________________________________________________________  MMode/2D Measurements & Calculations     IVSd: 1.2 cm  LVIDd: 5.4 cm  LVIDs: 3.7 cm  LVPWd: 0.97 cm  FS: 31.8 %  LV mass(C)d: 225.7 grams  LV mass(C)dI: 109.5 grams/m2  Ao root diam: 3.0 cm  LA dimension: 5.0 cm  asc Aorta Diam: 3.6 cm  LA/Ao: 1.7  LA Volume (BP): 96.1 ml  LA Volume Index (BP): 46.7 ml/m2  RWT: 0.36     Doppler Measurements & Calculations  MV E max del: 108.0 cm/sec  MV A max del: 85.9 cm/sec  MV E/A: 1.3  MV dec slope: 515.4 cm/sec2  PA acc time: 0.12 sec  E/E' av.8  Lateral E/e': 11.1  Medial E/e': 16.5     ______________________________________________________________________________  Report approved by: Kirt Bran 2021 01:13 PM           Medications     heparin (porcine) 500 Units/hr (21 0948)     - MEDICATION INSTRUCTIONS -         - MEDICATION INSTRUCTIONS for Dialysis Patients -   Does not apply See Admin Instructions     amLODIPine  5 mg Oral BID     apremilast  30 mg Oral Daily     atorvastatin  20 mg Oral Daily     azithromycin  250 mg Intravenous Q24H      carvedilol  25 mg Oral BID w/meals     cefTRIAXone  2 g Intravenous Q24H     furosemide  80 mg Oral BID     heparin ANTICOAGULANT  5,000 Units Subcutaneous Q12H     insulin aspart  1-7 Units Subcutaneous TID AC     insulin aspart  1-5 Units Subcutaneous At Bedtime     insulin glargine  14 Units Subcutaneous At Bedtime     sertraline  50 mg Oral At Bedtime     sevelamer carbonate  800 mg Oral TID w/meals     sodium chloride (PF)  3 mL Intracatheter Q8H     triamcinolone   Topical BID

## 2021-11-25 NOTE — PROGRESS NOTES
"MD Notification    Notified Person: MD    Notified Person Name: Dr. Bradford     Notification Date/Time: 11/25/2021 1318    Notification Interaction: Paged    Purpose of Notification: \"FYI Pt is back from Dialysis. is pt being dchrg today? please advise, thank you  marleni *[id number]\"    Orders Received:    Comments:    "

## 2021-11-25 NOTE — PLAN OF CARE
"Date/Time: November 25, 2021   Reason for Admission:inc SOB + pna    Cognitive/Mentation:AOx4  Neuros/CMS:intact ex baseline n/t  VS:VSS on 2L O2; B/P: 129/44, T: 98.7, P: 77, R: 16   Tele: NS    GI:denies n/v  :oliguria  Pain:denies    Skin:intact ex L AKA, mepilex on coccyx  Activity:w/c @ baseline; pivot to chair/bedside commode  Diet:dialysis     Discharge:pending    End of shift summary: no events overnight. Up in chair most of night. Pt c/o slight SOB, reeducated pt on importance of lasix - pt refused and states she \"may take it tomorrow\". . Will have dialysis 11/25.     "

## 2021-11-25 NOTE — PROGRESS NOTES
Potassium   Date Value Ref Range Status   11/24/2021 3.8 3.4 - 5.3 mmol/L Final   04/17/2021 4.2 3.4 - 5.3 mmol/L Final     Hemoglobin   Date Value Ref Range Status   11/24/2021 9.2 (L) 11.7 - 15.7 g/dL Final   04/16/2021 10.9 (L) 11.7 - 15.7 g/dL Final     Creatinine   Date Value Ref Range Status   11/24/2021 4.39 (H) 0.52 - 1.04 mg/dL Final   04/17/2021 5.98 (H) 0.52 - 1.04 mg/dL Final     Urea Nitrogen   Date Value Ref Range Status   11/24/2021 37 (H) 7 - 30 mg/dL Final   04/17/2021 68 (H) 7 - 30 mg/dL Final     Sodium   Date Value Ref Range Status   11/24/2021 143 133 - 144 mmol/L Final   04/17/2021 137 133 - 144 mmol/L Final     INR   Date Value Ref Range Status   04/16/2021 1.14 0.86 - 1.14 Final       DIALYSIS PROCEDURE NOTE  Hepatitis status of previous patient on machine log was checked and verified ok to use with this patients hepatitis status.  Patient dialyzed for 3.5 hrs. on a K3 bath with a net fluid removal of  80.4L.  A BFR of 425 ml/min was obtained via a L AVF using 15gauge needles.      The treatment plan was discussed with Dr. Oneill during the treatment.    Total heparin received during the treatment: 1700 units.   Needle cannulation sites held x 10 min.       Meds  given: Epo, hect   Complications: none      Person educated: pt. Knowledge base excellent.     ICEBOAT? Timeout performed pre-treatment  I: Patient was identified using 2 identifiers  C:  Consent Signed Yes  E: Equipment preventative maintenance is current and dialysis delivery system OK to use  B: Hepatitis B Surface Antigen: neg; Draw Date: 11/11/21      Hepatitis B Surface Antibody: michelle; Draw Date: 11/11/21  O: Dialysis orders present and complete prior to treatment  A: Vascular access verified and assessed prior to treatment  T: Treatment was performed at a clinically appropriate time  ?: Patient was allowed to ask questions and address concerns prior to treatment  See flowsheet in EPIC for further details and post  assessment.  Machine water alarm in place and functioning. Transducer pods intact and checked every 15min.   Pt returned via bed.  Chlorine/Chloramine water system checked every 4 hours.      Please remove patient dressing on AVF and AVG needle sites 24 hours after dialysis. If leaking occurs please apply a Band-Aid.

## 2021-11-25 NOTE — PROGRESS NOTES
Assessment and Plan:   ESRD: seen during dialysis. Rnning with LAG (bovine graft). Has developing CLEM no yet in use. 3.5h, 400 BFR, LAF, 3L UF, 2K 33 HCO3 and 140 Na. EPO and hectorol on the run. Next run Saturday unless discharged.             Interval History:   Resp failure:  Pneumonia and fluid overload. Should improve with 3L UF today.      CHF:   DM: diabetic foot ulcers and infections.               Review of Systems:   C/O orthopnea. No sx on dialysis.           Medications:       - MEDICATION INSTRUCTIONS for Dialysis Patients -   Does not apply See Admin Instructions     amLODIPine  5 mg Oral BID     apremilast  30 mg Oral Daily     atorvastatin  20 mg Oral Daily     azithromycin  250 mg Intravenous Q24H     carvedilol  25 mg Oral BID w/meals     cefTRIAXone  2 g Intravenous Q24H     furosemide  80 mg Oral BID     heparin ANTICOAGULANT  5,000 Units Subcutaneous Q12H     insulin aspart  1-7 Units Subcutaneous TID AC     insulin aspart  1-5 Units Subcutaneous At Bedtime     insulin glargine  14 Units Subcutaneous At Bedtime     sertraline  50 mg Oral At Bedtime     sevelamer carbonate  800 mg Oral TID w/meals     sodium chloride (PF)  3 mL Intracatheter Q8H     triamcinolone   Topical BID       heparin (porcine) 500 Units/hr (11/25/21 0948)     - MEDICATION INSTRUCTIONS -       Current active medications and PTA medications reviewed, see medication list for details.            Physical Exam:   Vitals were reviewed  Patient Vitals for the past 24 hrs:   BP Temp Temp src Pulse Resp SpO2   11/25/21 0945 (!) 151/79 -- -- 69 -- --   11/25/21 0930 (!) 158/67 -- -- 72 -- --   11/25/21 0913 (!) 148/68 -- -- 76 -- --   11/25/21 0900 (!) 151/76 98.1  F (36.7  C) Oral 77 16 97 %   11/25/21 0737 101/49 98  F (36.7  C) Oral 74 16 98 %   11/25/21 0100 129/44 98.7  F (37.1  C) Oral 77 16 94 %   11/24/21 2333 -- -- -- -- -- 92 %   11/24/21 2009 -- -- -- -- -- 99 %   11/24/21 2008 -- -- -- -- -- 99 %   11/24/21 2007  -- -- -- -- -- 90 %   21 139/63 99.6  F (37.6  C) Oral 80 16 92 %   21 1613 125/65 98.8  F (37.1  C) Oral 76 18 99 %   21 1216 122/63 98.3  F (36.8  C) Oral 68 18 93 %   21 1051 -- -- -- -- -- 90 %       Temp:  [98  F (36.7  C)-99.6  F (37.6  C)] 98.1  F (36.7  C)  Pulse:  [68-80] 69  Resp:  [16-18] 16  BP: (101-158)/(44-79) 151/79  SpO2:  [90 %-99 %] 97 %    Temperatures:  Current - Temp: 98.1  F (36.7  C); Max - Temp  Av.6  F (37  C)  Min: 98  F (36.7  C)  Max: 99.6  F (37.6  C)  Respiration range: Resp  Av.7  Min: 16  Max: 18  Pulse range: Pulse  Av.3  Min: 68  Max: 80  Blood pressure range: Systolic (24hrs), Av , Min:101 , Max:158   ; Diastolic (24hrs), Av, Min:44, Max:79    Pulse oximetry range: SpO2  Av.8 %  Min: 90 %  Max: 99 %    I/O last 3 completed shifts:  In: 240 [P.O.:240]  Out: -       Intake/Output Summary (Last 24 hours) at 2021 1012  Last data filed at 2021 1800  Gross per 24 hour   Intake 240 ml   Output --   Net 240 ml       Alert, NC O2  Resting comfortably in bed  LAG with needles in place.        Wt Readings from Last 4 Encounters:   21 99.8 kg (220 lb)   21 94.3 kg (208 lb)   21 72.8 kg (160 lb 7.9 oz)   21 116 kg (255 lb 11.7 oz)          Data:          Lab Results   Component Value Date     2021     2021     2021     2021     2021     2020    Lab Results   Component Value Date    CHLORIDE 106 2021    CHLORIDE 109 2021    CHLORIDE 110 2021    CHLORIDE 105 2021    CHLORIDE 101 2021    CHLORIDE 106 2020    Lab Results   Component Value Date    BUN 37 2021    BUN 69 2021    BUN 64 2021    BUN 68 2021    BUN 53 2021    BUN 44 2020      Lab Results   Component Value Date    POTASSIUM 3.8 2021    POTASSIUM 6.6 2021    POTASSIUM 5.5 2021     POTASSIUM 4.2 04/17/2021    POTASSIUM 3.8 04/16/2021    POTASSIUM 3.9 04/09/2021    Lab Results   Component Value Date    CO2 31 11/24/2021    CO2 23 11/23/2021    CO2 24 11/22/2021    CO2 23 04/17/2021    CO2 22 04/09/2021    CO2 29 11/18/2020    Lab Results   Component Value Date    CR 4.39 11/24/2021    CR 6.43 11/23/2021    CR 5.82 11/22/2021    CR 5.98 04/17/2021    CR 4.35 04/16/2021    CR 5.28 04/09/2021        Recent Labs   Lab Test 11/24/21  0658 11/23/21  0634 11/22/21  1803   WBC 7.0 10.1 10.9   HGB 9.2* 10.0* 10.2*   HCT 29.7* 33.0* 32.8*   * 105* 105*   * 184 181     Recent Labs   Lab Test 11/22/21  1803 08/17/21  0715 06/05/20  1307   AST 15 14 6   ALT 20 17 12   ALKPHOS 121 96 53   BILITOTAL 0.3 0.4 0.1*       Recent Labs   Lab Test 04/17/21  0755 11/18/20  0636 05/08/20  0540   MAG 2.7* 2.1 2.6*     Recent Labs   Lab Test 08/23/21  0643 08/20/21  0734 08/18/21  0727   PHOS 5.2* 4.6* 7.0*     Recent Labs   Lab Test 11/24/21  0658 11/23/21  0634 11/22/21  1803   JODY 8.6 9.3 9.3       Lab Results   Component Value Date    JODY 8.6 11/24/2021     Lab Results   Component Value Date    WBC 7.0 11/24/2021    HGB 9.2 (L) 11/24/2021    HCT 29.7 (L) 11/24/2021     (H) 11/24/2021     (L) 11/24/2021     Lab Results   Component Value Date     11/24/2021    POTASSIUM 3.8 11/24/2021    CHLORIDE 106 11/24/2021    CO2 31 11/24/2021     (H) 11/25/2021     Lab Results   Component Value Date    BUN 37 (H) 11/24/2021    CR 4.39 (H) 11/24/2021     Lab Results   Component Value Date    MAG 2.7 (H) 04/17/2021     Lab Results   Component Value Date    PHOS 5.2 (H) 08/23/2021       Creatinine   Date Value Ref Range Status   11/24/2021 4.39 (H) 0.52 - 1.04 mg/dL Final   11/23/2021 6.43 (H) 0.52 - 1.04 mg/dL Final   11/22/2021 5.82 (H) 0.52 - 1.04 mg/dL Final   08/23/2021 6.81 (H) 0.52 - 1.04 mg/dL Final   08/20/2021 5.60 (H) 0.52 - 1.04 mg/dL Final   08/18/2021 6.73 (H) 0.52 - 1.04 mg/dL  Final   04/17/2021 5.98 (H) 0.52 - 1.04 mg/dL Final   04/16/2021 4.35 (H) 0.52 - 1.04 mg/dL Final   04/09/2021 5.28 (H) 0.52 - 1.04 mg/dL Final   11/18/2020 4.23 (H) 0.52 - 1.04 mg/dL Final   11/16/2020 5.08 (H) 0.52 - 1.04 mg/dL Final   09/25/2020 6.87 (H) 0.52 - 1.04 mg/dL Final       Attestation:  I have reviewed today's vital signs, notes, medications, labs and imaging.     Braden Oneill MD

## 2021-11-25 NOTE — PLAN OF CARE
A/O x4, VSS on 1L NC. Lift assist. PIV saline locked. Scheduled med's given. Refused furosemide. Blood sugar 125,154. TELE SR. Coughing intermittently, dspnea with exertion. PIV SL w/ intermittent IV antibiotics. Dialysis in tomorrow morning. Discharge pending.

## 2021-11-26 ENCOUNTER — DOCUMENTATION ONLY (OUTPATIENT)
Dept: HOME HEALTH SERVICES | Facility: CLINIC | Age: 71
End: 2021-11-26
Payer: MEDICARE

## 2021-11-26 ENCOUNTER — APPOINTMENT (OUTPATIENT)
Dept: PHYSICAL THERAPY | Facility: CLINIC | Age: 71
DRG: 193 | End: 2021-11-26
Payer: MEDICARE

## 2021-11-26 VITALS
RESPIRATION RATE: 18 BRPM | BODY MASS INDEX: 35.27 KG/M2 | DIASTOLIC BLOOD PRESSURE: 53 MMHG | OXYGEN SATURATION: 96 % | SYSTOLIC BLOOD PRESSURE: 126 MMHG | HEIGHT: 65 IN | TEMPERATURE: 98.4 F | WEIGHT: 211.7 LBS | HEART RATE: 59 BPM

## 2021-11-26 LAB
GLUCOSE BLDC GLUCOMTR-MCNC: 116 MG/DL (ref 70–99)
GLUCOSE BLDC GLUCOMTR-MCNC: 83 MG/DL (ref 70–99)
GLUCOSE BLDC GLUCOMTR-MCNC: 97 MG/DL (ref 70–99)
HBV SURFACE AB SERPL IA-ACNC: 0.73 M[IU]/ML
HBV SURFACE AG SERPL QL IA: NONREACTIVE

## 2021-11-26 PROCEDURE — 99239 HOSP IP/OBS DSCHRG MGMT >30: CPT | Performed by: INTERNAL MEDICINE

## 2021-11-26 PROCEDURE — 97116 GAIT TRAINING THERAPY: CPT | Mod: GP | Performed by: PHYSICAL THERAPIST

## 2021-11-26 PROCEDURE — 250N000013 HC RX MED GY IP 250 OP 250 PS 637: Performed by: NURSE PRACTITIONER

## 2021-11-26 PROCEDURE — 250N000013 HC RX MED GY IP 250 OP 250 PS 637: Performed by: INTERNAL MEDICINE

## 2021-11-26 PROCEDURE — 250N000011 HC RX IP 250 OP 636: Performed by: INTERNAL MEDICINE

## 2021-11-26 PROCEDURE — 97530 THERAPEUTIC ACTIVITIES: CPT | Mod: GP | Performed by: PHYSICAL THERAPIST

## 2021-11-26 RX ORDER — ALBUMIN (HUMAN) 12.5 G/50ML
50 SOLUTION INTRAVENOUS
Status: CANCELLED | OUTPATIENT
Start: 2021-11-26

## 2021-11-26 RX ORDER — CEFTRIAXONE 2 G/1
2 INJECTION, POWDER, FOR SOLUTION INTRAMUSCULAR; INTRAVENOUS ONCE
Status: COMPLETED | OUTPATIENT
Start: 2021-11-26 | End: 2021-11-26

## 2021-11-26 RX ORDER — HEPARIN SODIUM 1000 [USP'U]/ML
500 INJECTION, SOLUTION INTRAVENOUS; SUBCUTANEOUS CONTINUOUS
Status: CANCELLED | OUTPATIENT
Start: 2021-11-26

## 2021-11-26 RX ORDER — AZITHROMYCIN 250 MG/1
250 TABLET, FILM COATED ORAL ONCE
Status: COMPLETED | OUTPATIENT
Start: 2021-11-26 | End: 2021-11-26

## 2021-11-26 RX ORDER — DOXERCALCIFEROL 4 UG/2ML
4 INJECTION INTRAVENOUS
Status: CANCELLED | OUTPATIENT
Start: 2021-11-26 | End: 2021-11-26

## 2021-11-26 RX ORDER — CEFUROXIME AXETIL 250 MG/1
500 TABLET ORAL 2 TIMES DAILY
Qty: 4 TABLET | Refills: 0 | Status: SHIPPED | OUTPATIENT
Start: 2021-11-27 | End: 2022-01-28

## 2021-11-26 RX ADMIN — AMLODIPINE BESYLATE 5 MG: 5 TABLET ORAL at 09:33

## 2021-11-26 RX ADMIN — TRIAMCINOLONE ACETONIDE: 0.25 OINTMENT TOPICAL at 09:36

## 2021-11-26 RX ADMIN — HEPARIN SODIUM 5000 UNITS: 5000 INJECTION, SOLUTION INTRAVENOUS; SUBCUTANEOUS at 09:33

## 2021-11-26 RX ADMIN — AZITHROMYCIN MONOHYDRATE 250 MG: 250 TABLET ORAL at 11:44

## 2021-11-26 RX ADMIN — ATORVASTATIN CALCIUM 20 MG: 20 TABLET, FILM COATED ORAL at 09:33

## 2021-11-26 RX ADMIN — FUROSEMIDE 80 MG: 40 TABLET ORAL at 09:33

## 2021-11-26 RX ADMIN — SEVELAMER CARBONATE 800 MG: 800 TABLET, FILM COATED ORAL at 09:33

## 2021-11-26 RX ADMIN — ACETAMINOPHEN 650 MG: 325 TABLET, FILM COATED ORAL at 05:15

## 2021-11-26 RX ADMIN — CEFTRIAXONE SODIUM 2 G: 2 INJECTION, POWDER, FOR SOLUTION INTRAMUSCULAR; INTRAVENOUS at 11:44

## 2021-11-26 RX ADMIN — SEVELAMER CARBONATE 800 MG: 800 TABLET, FILM COATED ORAL at 12:15

## 2021-11-26 RX ADMIN — CARVEDILOL 25 MG: 12.5 TABLET, FILM COATED ORAL at 09:33

## 2021-11-26 ASSESSMENT — ACTIVITIES OF DAILY LIVING (ADL)
ADLS_ACUITY_SCORE: 19
ADLS_ACUITY_SCORE: 18
ADLS_ACUITY_SCORE: 16
ADLS_ACUITY_SCORE: 18
ADLS_ACUITY_SCORE: 18
ADLS_ACUITY_SCORE: 19
ADLS_ACUITY_SCORE: 18
ADLS_ACUITY_SCORE: 19
ADLS_ACUITY_SCORE: 18
ADLS_ACUITY_SCORE: 16

## 2021-11-26 ASSESSMENT — MIFFLIN-ST. JEOR: SCORE: 1471.14

## 2021-11-26 NOTE — PLAN OF CARE
Aox4. VSS ex O2 89 on RA, 97 on 1L. Denies pain. Denies SOB. LS diminished. Moves A1-2 GB/W. L AKA. Dialysis diet, blood sugar checks. Fistula in RUE and LUE. Low urine OP, on hemodialysis. PIV SL, intermittent Iv abx. Expected discharge 11/26. Continue to monitor.

## 2021-11-26 NOTE — PROGRESS NOTES
Received intake call for home oxygen at 10:50AM. Reviewed patient's chart; Patient qualifies under insurance relaxed guidelines but we need physician notes.   10:55AM- Called care coordinator, Alina, letting her know that I need oxygen notes.  10:57AM- Called patient's daughter Caroline Gray to offer choice and they are okay with [facility] setting them up. Discussed equipment with Caroline Gray and let her know that once I get the notes we will send a  to deliver and instruct on use of oxygen equipment. She understood.   12:05PM- Spoke with Alina again, let her know I'm still waiting on notes.  1:02PM- Called Alina, we received the notes and can deliver the oxygen.

## 2021-11-26 NOTE — PROGRESS NOTES
[facility] arranged for 1:00 PM.      Contacted [facility] to dispense Home Oxygen for nocturnal use (should be able to be delivered to pt home since she does not need it for discharge transport), order is in place; added info to AVS:     1. HOMECARE AGENCY: Your doctor has ordered home care to help you after your hospital stay.  The staff will contact you to schedule your first visit.  This service will be provided by [facility].  If you have any question, or have not received a call within 48 hours of discharge, please call them at [phone] or [phone]. *please see homecare quality ratings for all homecares in your area at www.medicare.gov     2. HOME OXYGEN PROVIDER: Your Home Oxygen will be provided by: [facility], Phone: [phone]     3. OXYGEN LEVEL TESTING: Please attend the virtual Sleep Center visit Dec 3 (with Dr. Alexander) in order to get set up for a home overnight oximetry test--you have had sleep studies in the past and did not tolerate CPAP, but the hospital doctors think you may at least need Home Oxygen at night and need this overnight testing to confirm and continue to qualify for it     Will call daughter re: transport options as she has indicated to MD that she cannot transport pt home. Added resources to AVS for daughter future reference re: oximeter and transport companies.    OPTIONAL: you may purchase a pulse oximeter to measure your pulse and oxygen level.  These are available on [employer], [employer], [employer], and in many other stores.       A normal pulse (heart rate, pulse rate, ) is  beats per minute.      A normal oximetry reading (saturation, %) is >90%.    Please contact your healthcare provider if your reading is outside your normal range.      Example of devices (image taken from Amazon, brand and price varies by location):           Medical transportation companies    1. [employer]  Phone:  [phone]  Appointments: [email or web address]    2. [employer]  Phone: [phone]  Address: [address], [place] [address]    3. [facility]  Phone: [phone]  [address] [address]    4. [employer]  Phone: [phone] / Address: [address], [address], [place] [address]  Web: [email or web address]  [employer] - Non-Emergency Transportation Services - [place], [place] - Services  [employer] serves [place], [place] by providing Non-Emergency Transportation Services.   Our Private Pay rates are (per [email or web address] ):            Please call [phone] to schedule a ride or to talk to a [employer] representative  We also accept payments through: Blue Plus, [employer]wave, [employer], Medical Assistance, [employer] or ([place]), P, and Private Pay clients who have transportation benefits.    5. Other resource that can be billed thru insurance if you have a plan that includes transportation:       Insurance plan-based transportation for [employer]: [phone]        Alina Toney RN, BSN, PHN  [facility]  Inpatient Care Management - FLOAT  Mobile: [phone] 11/26/21 until 4pm  (after today's date, please call the patient's unit)

## 2021-11-26 NOTE — PLAN OF CARE
Physical Therapy Discharge Summary    Reason for therapy discharge:    Discharged to home.    Progress towards therapy goal(s). See goals on Care Plan in [facility] electronic health record for goal details.  Goals partially met.  Barriers to achieving goals:   discharge from facility.    Therapy recommendation(s):    No further therapy is recommended.

## 2021-11-26 NOTE — PLAN OF CARE
Pt is A&Ox4, VSS on RA, IV SL, Dialysis diet, Ax1-2 w/ GB and walker, WCB at baseline, managed pain w/ prn tylenol, possible dchrg tomorrow, continue to monitor

## 2021-11-26 NOTE — PROGRESS NOTES
Care Management Discharge Note    Discharge Date: 11/26/2021       Discharge Disposition: Home,Home Care ([facility])    Discharge Services:  (transportation resources given to daughter)    Discharge DME: Oxygen (nocturnal)    Discharge Transportation: other (see comments) ([facility]  per daughter request)    Private pay costs discussed: transportation costs    PAS Confirmation Code:  (n/a)  Patient/family educated on Medicare website which has current facility and service quality ratings: yes    Education Provided on the Discharge Plan:  Yes   Persons Notified of Discharge Plans: daughter, patient   Patient/Family in Agreement with the Plan: yes    Handoff Referral Completed: Yes    Additional Information:  [facility] medical transportation will provide wheelchair transport (pt has own wheelchair here) at 1pm.  + pt confirmed she has a ramp to enter her home   [facility] will deliver nocturnal oxygen to patient's home and coordinate with patient's daughter.   [facility] will resume home health services.   Handoff referral sent.       Alina Toney RN, BSN, PHN  [facility]  Inpatient Care Management - FLOAT  Mobile: [phone] 11/26/21 until 4pm  (after today's date, please call the patient's unit)

## 2021-11-26 NOTE — PROGRESS NOTES
Oxygen Documentation:   I certify that this patient, Supriya Herr has been under my care (or a nurse practitioner or physican's assistant working with me). This is the face-to-face encounter for oxygen medical necessity.      Supriya Herr is now in a chronic stable state and continues to require supplemental oxygen. Patient has continued oxygen desaturation due to Nocturnal hypoxia.    Alternative treatment(s) tried or considered and deemed clinically infective for treatment of nocturnal hypoxia include inhalers.  If portability is ordered, is the patient mobile within the home? no    **Patients who qualify for home O2 coverage under the CMS guidelines require ABG tests or O2 sat readings obtained closest to, but no earlier than 2 days prior to the discharge, as evidence of the need for home oxygen therapy. Testing must be performed while patient is in the chronic stable state. See notes for O2 sats.**

## 2021-11-26 NOTE — DISCHARGE INSTRUCTIONS
OPTIONAL: you may purchase a pulse oximeter to measure your pulse and oxygen level.  These are available on "[employer]", [employer], [employer], and in many other stores.       A normal pulse (heart rate, pulse rate, ) is  beats per minute.      A normal oximetry reading (saturation, %) is >90%.    Please contact your healthcare provider if your reading is outside your normal range.      Example of devices (image taken from Amazon, brand and price varies by location):           Medical transportation companies    1. [employer]  Phone: [phone]  Appointments: [employer]"    2. [employer]  Phone: [phone]  Address: [address] [address]    3. [facility]  Phone: [phone]  Address: [address] [place] [address]    4. [employer]  Phone: [phone] / Address: [address], [place] [address]  Web: [email or web address]  [employer] - Non-Emergency Transportation Services - [place], [place] - Services  [employer] serves [place], [place] by providing Non-Emergency Transportation Services.   Our Private Pay rates are (per [email or web address] ):            Please call [phone] to schedule a ride or to talk to a [employer] representative  We also accept payments through: Blue Plus, [employer], Medical Assistance, [employer] or (MTM), P, and Private Pay clients who have transportation benefits.    5. Other resource that can be billed thru insurance if you have a plan that includes transportation:       Insurance plan-based transportation for [employer]: [phone]

## 2021-11-26 NOTE — DISCHARGE SUMMARY
[facility]    Discharge Summary  Hospitalist    Date of Admission:  11/23/2021  Date of Discharge:  11/26/2021  Discharging Provider: Travis Hernandez MD    Discharge Diagnoses     Acute hypoxic respiratory failure, multifactorial-resolved  Right lower lobe pneumonia  ESRD on HD   Acute hyperkalemia  Acute metabolic encephalopathy-resolved  Chronic diastolic CHF  Diabetes mellitus, type 2, on long term insulin with retinopathy, peripheral neuropathy  History of right foot osteomyelitis with nonhealing wound  Hypertension  Hyperlipidemia  Psoriasis and psoriatic arthritis  JONE  Chronic anemia due to renal disease and iron deficiency  Hx traumatic amputation of LLE (1989)      Hospital Course:    Supriya Herr is a 71 year old female PMH including DMII, CKD, JONE, chronic diastolic CHF, PVD,  s/p traumatic left AKA, psoriatic arthritis, ESRD on HD (T/Th/Sat), hx of COVID19 infection 8/2021, among other chronic comorbidities who presents to the ED with worsening shortness of breath/cough. Admitted 11/23/2021.      Acute hypoxic respiratory failure, multifactorial-resolved  Suspect secondary to combination of pneumonia and volume overload  -Initially was hypoxic, required oxygen, however by the day of discharge she was weaned off oxygen.  She was however found to have nocturnal hypoxia.  Please see below     Right lower lobe pneumonia  Chest x-ray with right basilar opacities.  Suspect presence of pneumonia given report of new productive cough, borderline fever.  White blood cell count normal. COVID19/Influenza A&B negative.   -Received IV ceftriaxone in the hospital.  Completed 5 days of azithromycin.  She will discharge on 2 more days of oral Ceftin.     ESRD on HD   Acute hyperkalemia  Suspected to have component of volume overload contributing to symptoms. Reports improvement following HD. Potassium 6.6, treated with D50, insulin, albuterol and calcium in ED.  -Nephrology follow-up, last  dialysis run was 11/25.  No active issues at the moment.  Suspect her volume overload state and hyperkalemia was due to a reported shortened run as outpatient  -Potassium normalized 11/24      Acute metabolic encephalopathy   Patient is somewhat inconsistent and confused on exam, had some hallucinations overnight per daughter. No focal deficits.  Suspect multifactorial with possible uremia and pneumonia contributing  - Improved to baseline.    Chronic diastolic CHF  By history; most recent TTE in 2018 showed preserved LVEF 60-65%. Lexiscan in 2018 reportedly was negative for inducible ischemia. She reports chronic dyspnea. Spirometry in 2018 was reportedly normal.  proBNP significantly elevated, though difficult to interpret in setting of ESRD/HD.  -TTE with normal EF at 60-65%, no wall motion abnormalities.  No significant valvular pathology.  -Continue prior to admission furosemide   -Stable and back to baseline now.     Diabetes mellitus, type 2, on long term insulin with retinopathy, peripheral neuropathy  History of right foot osteomyelitis with nonhealing wound  -Continue prior to admission regimen.     Hypertension  - Continue prior to admission amlodipine, carvedilol     Hyperlipidemia  - Continue prior to admission atorvastatin     Psoriasis and psoriatic arthritis  - Continue prior to admission Aprelimast with home supply (not stocked)     JONE  - Not currently on CPAP.  She did have some nocturnal drops in her O2 sats down to 89%.  I will arrange for nighttime oxygen at discharge.  She will need a follow-up sleep evaluation.     Chronic anemia due to renal disease and iron deficiency  -Hgb 10.0 on admission, baseline 10-11.      Hx traumatic amputation of LLE (1989)    Travis Hernandez MD    Significant Results and Procedures   See below    Pending Results     Unresulted Labs Ordered in the Past 30 Days of this Admission     Date and Time Order Name Status Description    11/24/2021  5:03 PM Hepatitis B  surface antigen In process     11/24/2021  5:03 PM Hepatitis B Surface Antibody In process           Code Status   Full Code       Primary Care Physician   Noam Jackson    Physical Exam   Temp: 97.4  F (36.3  C) Temp src: Oral BP: 138/60 Pulse: 67   Resp: 18 SpO2: 99 % O2 Device: Nasal cannula Oxygen Delivery: 1 LPM    Constitutional: AAOX3, NAD  Respiratory: CTA B/L, Normal WOB  Cardiovascular: RRR, No murmur  GI: Soft, Non- tender, BS- normoactive  Neuro: CN- grossly intact, Motor strength 5/5 on all 4 extremities.     Discharge Disposition   Discharged to home  Condition at discharge: Stable    Consultations This Hospital Stay   NEPHROLOGY IP CONSULT  PHARMACY IP CONSULT  PHYSICAL THERAPY ADULT IP CONSULT  OCCUPATIONAL THERAPY ADULT IP CONSULT  NEPHROLOGY IP CONSULT  CARE MANAGEMENT / SOCIAL WORK IP CONSULT    Time Spent on this Encounter   ITravis MD, personally saw the patient today and spent greater than 30 minutes discharging this patient.    Discharge Orders      Home care nursing referral      Reason for your hospital stay    Community acquired PNA/Diastolic CHF     Follow-up and recommended labs and tests    Follow up with primary care provider, Noam Jackson, within 7 days for hospital follow- up.  No follow up labs or test are needed.     Activity    Your activity upon discharge: activity as tolerated     Oxygen Adult/Peds    Oxygen Documentation:   I certify that this patient, Supriya Herr has been under my care (or a nurse practitioner or physican's assistant working with me). This is the face-to-face encounter for oxygen medical necessity.      Supriya Herr is now in a chronic stable state and continues to require supplemental oxygen. Patient has continued oxygen desaturation due to Nocturnal hypoxia.    Alternative treatment(s) tried or considered and deemed clinically infective for treatment of nocturnal hypoxia include inhalers.  If portability is ordered,  is the patient mobile within the home? no    **Patients who qualify for home O2 coverage under the CMS guidelines require ABG tests or O2 sat readings obtained closest to, but no earlier than 2 days prior to the discharge, as evidence of the need for home oxygen therapy. Testing must be performed while patient is in the chronic stable state. See notes for O2 sats.**     Diet    Follow this diet upon discharge: Orders Placed This Encounter      Combination Diet Dialysis Diet     Discharge Medications   Current Discharge Medication List      START taking these medications    Details   cefuroxime (CEFTIN) 250 MG tablet Take 2 tablets (500 mg) by mouth 2 times daily  Qty: 4 tablet, Refills: 0    Associated Diagnoses: Pneumonia of right lower lobe due to infectious organism         CONTINUE these medications which have NOT CHANGED    Details   acetaminophen (TYLENOL) 325 MG tablet Take 2 tablets (650 mg) by mouth every 4 hours as needed for mild pain  Qty: 50 tablet, Refills: 0    Associated Diagnoses: ESRD on dialysis (H)      albuterol (PROAIR HFA/PROVENTIL HFA/VENTOLIN HFA) 108 (90 Base) MCG/ACT inhaler Inhale 2 puffs into the lungs every 6 hours as needed for shortness of breath / dyspnea or wheezing  Qty: 3 Inhaler, Refills: 1    Comments: Pharmacy may dispense brand covered by insurance (Proair, or proventil or ventolin or generic albuterol inhaler)  Associated Diagnoses: Cough      amLODIPine (NORVASC) 5 MG tablet Take 1 tablet (5 mg) by mouth 2 times daily  Qty: 180 tablet, Refills: 3    Associated Diagnoses: Hypertension goal BP (blood pressure) < 140/90      apremilast (OTEZLA) 30 MG tablet Take 1 tablet (30 mg) by mouth daily  Qty: 90 tablet, Refills: 3    Associated Diagnoses: Inverse psoriasis      atorvastatin (LIPITOR) 20 MG tablet TAKE 1 TABLET BY MOUTH EVERY DAY IN THE EVENING  Qty: 90 tablet, Refills: 0    Associated Diagnoses: Hypercholesterolemia      carvedilol (COREG) 25 MG tablet Take 1 tablet (25  mg) by mouth 2 times daily (with meals)  Qty: 180 tablet, Refills: 3    Associated Diagnoses: Essential hypertension      ciclopirox (LOPROX) 0.77 % cream Apply topically 2 times daily  Qty: 90 g, Refills: 1    Associated Diagnoses: Tinea pedis of right foot      desonide (DESOWEN) 0.05 % external ointment Apply topically as needed      Epoetin Martínez (EPOGEN IJ) Inject 800 Units into the vein three times a week tues thurs sat at dialysis      furosemide (LASIX) 80 MG tablet Take 1 tablet (80 mg) by mouth 2 times daily  Qty: 180 tablet, Refills: 3    Associated Diagnoses: CKD (chronic kidney disease) stage 5, GFR less than 15 ml/min (H); Anemia due to stage 5 chronic kidney disease, 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 (H)      gabapentin (NEURONTIN) 100 MG capsule Take 1 capsule (100 mg) by mouth 3 times daily as needed (pain)  Qty: 90 capsule, Refills: 11    Associated Diagnoses: Pain of right hand      Glucagon (BAQSIMI ONE PACK) 3 MG/DOSE POWD Spray 3 mg in nostril See Admin Instructions USE ONLY FOR SEVERE HYPOGLYCEMIA.  Qty: 1 each, Refills: 3    Associated Diagnoses: Type 2 diabetes mellitus with hyperglycemia, with long-term current use of insulin (H)      insulin aspart (NOVOLOG FLEXPEN) 100 UNIT/ML pen INJECT 5 UNITS SUBCUTANEOUSLY WITH BREAKFAST, LUNCH AND DINNER.  Qty: 15 mL, Refills: 3    Associated Diagnoses: Type 2 diabetes mellitus with hyperglycemia, with long-term current use of insulin (H)      insulin glargine (BASAGLAR KWIKPEN) 100 UNIT/ML pen Inject 18 units subcutaneous daily.  Qty: 15 mL, Refills: 4    Comments: If Basaglar is not covered by insurance, may substitute Lantus at same dose and frequency.    Associated Diagnoses: Type 2 diabetes mellitus with diabetic neuropathy, with long-term current use of insulin (H)      Iron Sucrose (VENOFER IV) Inject 50 mg into the vein twice a week At dialysis session (tues/Sat)      miconazole (MICATIN) 2 % external powder Apply twice daily to skin folds as  needed  Qty: 90 g, Refills: 3    Associated Diagnoses: Intertrigo      nystatin (MYCOSTATIN) 916536 UNIT/GM external ointment Twice daily to finger rash until healed.  Qty: 15 g, Refills: 1    Associated Diagnoses: Pain of finger of right hand      sertraline (ZOLOFT) 50 MG tablet TAKE 1 TABLET BY MOUTH AT BEDTIME  Qty: 90 tablet, Refills: 3    Associated Diagnoses: NICOLE (generalized anxiety disorder)      sevelamer carbonate (RENVELA) 800 MG tablet Take 1 tablet (800 mg) by mouth Take with snacks or supplements Take 2 capsules with meals and 1 with snacks.  Qty: 90 tablet, Refills: 3    Associated Diagnoses: CKD (chronic kidney disease) stage 5, GFR less than 15 ml/min (H)      triamcinolone (KENALOG) 0.025 % external ointment Apply topically 2 times daily To rash under breasts and groin as needed  Qty: 80 g, Refills: 1    Associated Diagnoses: Inverse psoriasis      vitamin D3 (CHOLECALCIFEROL) 50 mcg (2000 units) tablet Take 1 tablet (50 mcg) by mouth daily  Qty: 100 tablet, Refills: 3    Associated Diagnoses: Vitamin D deficiency      blood glucose (ONE TOUCH DELICA) lancing device Device to be used with lancets.needs lancets device for delica lancets  Qty: 1 each, Refills: 1    Associated Diagnoses: Type 2 diabetes mellitus with diabetic chronic kidney disease, unspecified CKD stage, unspecified whether long term insulin use (H)      blood glucose (ONETOUCH ULTRA) test strip TEST YOUR BLOOD SUGAR 3-4 TIMES PER DAY.  Qty: 400 strip, Refills: 3    Comments: DX Code Needed Type 2 diabetes mellitus with diabetic nephropathy, with long-term current use of insulin (H) [E11.21, Z79.4]  Associated Diagnoses: Type 2 diabetes mellitus with diabetic nephropathy, with long-term current use of insulin (H)      Continuous Blood Gluc Sensor (FREESTYLE PHOENIX 2 SENSOR) MISC 1 each every 14 days 1 each every 14 days. Change every 14 days.  Qty: 2 each, Refills: 5    Associated Diagnoses: Type 2 diabetes mellitus with  "hyperglycemia, with long-term current use of insulin (H)      insulin pen needle (ULTICARE MINI) 31G X 6 MM Use daily or as directed.  Qty: 100 each, Refills: 1    Associated Diagnoses: Type 2 diabetes, HbA1c goal < 7% (H)      !! order for DME Equipment being ordered: mattress overlay for hospital bed  Wt. 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 order for DME 1 wheelchair  Qty: 1 Device, Refills: 0    Associated Diagnoses: Traumatic amputation of lower extremity above knee, unspecified laterality, subsequent encounter; CKD (chronic kidney disease) stage 3, GFR 30-59 ml/min (H); Type 2 diabetes mellitus with stage 3 chronic kidney disease, without long-term current use of insulin (H)       !! - Potential duplicate medications found. Please discuss with provider.        Allergies   Allergies   Allergen Reactions     Penicillins Rash     Unasyn Rash     No evidence SJS, but very uncomfortable and precipitated multiple provider visits. Would not use penicillins again if other options available.      Data   Most Recent 3 CBC's:  Recent Labs   Lab Test 11/24/21  0658 11/23/21  0634 11/22/21  1803   WBC 7.0 10.1 10.9   HGB 9.2* 10.0* 10.2*   * 105* 105*   * 184 181      Most Recent 3 BMP's:  Recent Labs   Lab Test 11/26/21  0838 11/26/21  0225 11/25/21  2132 11/24/21  1218 11/24/21  0658 11/23/21  1424 11/23/21  0634 11/22/21  2312 11/22/21 2003 11/22/21  1803   NA  --   --   --   --  143  --  139  --   --  141   POTASSIUM  --   --   --   --  3.8  --  6.6* 5.5*   < > 6.4*   CHLORIDE  --   --   --   --  106  --  109  --   --  110*   CO2  --   --   --   --  31  --  23  --   --  24   BUN  --   --   --   --  " 37*  --  69*  --   --  64*   CR  --   --   --   --  4.39*  --  6.43*  --   --  5.82*   ANIONGAP  --   --   --   --  6  --  7  --   --  7   JODY  --   --   --   --  8.6  --  9.3  --   --  9.3   GLC 83 97 138*   < > 78   < > 94  --    < > 88    < > = values in this interval not displayed.     Most Recent 2 LFT's:  Recent Labs   Lab Test 11/22/21  1803 08/17/21  0715   AST 15 14   ALT 20 17   ALKPHOS 121 96   BILITOTAL 0.3 0.4     Most Recent INR's and Anticoagulation Dosing History:  Anticoagulation Dose History     Recent Dosing and Labs Latest Ref Rng & Units 5/7/2020 6/5/2020 7/13/2020 9/25/2020 11/16/2020 4/9/2021 4/16/2021    INR 0.86 - 1.14 1.14 1.16(H) 1.11 1.14 1.15(H) 1.14 1.14        Most Recent 3 Troponin's:  Recent Labs   Lab Test 01/02/14  0130 01/01/14  1905 01/01/14  1300   TROPI <0.012 <0.012 <0.012     Most Recent Cholesterol Panel:  Recent Labs   Lab Test 01/17/20  1204   CHOL 145   LDL 74   HDL 55   TRIG 78     Most Recent 6 Bacteria Isolates From Any Culture (See EPIC Reports for Culture Details):  Recent Labs   Lab Test 03/19/21  0900 06/24/18  1100 06/23/18  2348 06/23/18  2347 06/18/18  0949 05/19/18  0119   CULT Light growth  Normal skin shree    Light growth  Streptococcus agalactiae sero group B  This organism is susceptible to ampicillin, penicillin, vancomycin and the cephalosporins.   If treatment is required AND your patient is allergic to penicillin, contact the   Microbiology Lab within 5 days to request susceptibility testing.  *  Moderate growth  Candida albicans  Susceptibility testing not routinely done  * Moderate growth  Corynebacterium striatum  Identification obtained by MALDI-TOF mass spectrometry research use only database. Test   characteristics determined and verified by the Infectious Diseases Diagnostic Laboratory   ([facility]) [place], [place].  *  Light growth  Staphylococcus aureus  *  Single colony  Beta hemolytic Streptococcus group B  *  Light growth  Gram positive  bacilli resembling diphtheroids  No further identification  Susceptibility testing not routinely done  *  Susceptibility testing requested by  Dr. Chen for routine sensitivities on the Corynebacterium striatum on 6.26.18. ME.    Moderate growth  Staphylococcus aureus  Susceptibility testing done on previous specimen  *  Moderate growth  Corynebacterium striatum  Identification obtained by MALDI-TOF mass spectrometry research use only database. Test   characteristics determined and verified by the Infectious Diseases Diagnostic Laboratory   ([facility]) [place], [place].  Susceptibility testing not routinely done  *  Incorrectly ordered by PCU/Clinic  Canceled, Test credited  See Accesion number [id number] No growth No growth Moderate growth  Beta hemolytic Streptococcus group C  *  Moderate growth  Staphylococcus aureus  *  Light growth  Normal skin shree   No growth     Most Recent TSH, T4 and A1c Labs:  Recent Labs   Lab Test 11/23/21  0634 04/09/21  1456 01/17/20  1204   TSH  --   --  2.93   A1C 6.2*   < >  --     < > = values in this interval not displayed.       Results for orders placed or performed during the hospital encounter of 11/23/21   XR Chest 2 Views    Narrative    CHEST TWO VIEWS 11/23/2021 7:12 AM     HISTORY: shortness of breath    COMPARISON: Chest x-ray 11/22/2021       Impression    IMPRESSION: Elevation of the right hemidiaphragm. Right basilar  opacities minimally changed since yesterday and likely due to volume  loss, although pneumonitis is not entirely excluded. Low lung volumes  accentuate the heart size and pulmonary vasculature, which are likely  within normal limits. No pleural effusion or pneumothorax.    ROMAN STERLING MD         SYSTEM ID:  [id number]   Echocardiogram Complete     Value    LVEF  60-65%    Narrative    [id number]  [id number]  [id number]  [id number]^KUN^DEBORAH     [facility]  Echocardiography Laboratory  [address] [place] [address]     Name:  BROOKE ARANA  MRN: [id number]  : 1950  Study Date: 2021 11:07 AM  Age: 71 yrs  Gender: Female  Patient Location: [facility]  Reason For Study: Dyspnea, CHF  Ordering Physician: KUN LEY  Performed By: Celeste Masters     BSA: 2.1 m2  Height: 65 in  Weight: 220 lb  HR: 71  BP: 139/79 mmHg  ______________________________________________________________________________  Procedure  Complete Portable Echo Adult.  ______________________________________________________________________________  Interpretation Summary     1. Left ventricular systolic function is normal. The visual ejection fraction  is 60-65%.  2. No regional wall motion abnormalities noted.  3. The right ventricle is normal in structure, function and size.  4. The left atrium is moderately dilated.  5. No evidence for significant valvular pathology.  ______________________________________________________________________________  Left Ventricle  The left ventricle is normal in size. There is normal left ventricular wall  thickness. Left ventricular systolic function is normal. The visual ejection  fraction is 60-65%. Left ventricular diastolic function is normal. No regional  wall motion abnormalities noted.     Right Ventricle  The right ventricle is normal in structure, function and size.     Atria  The left atrium is moderately dilated. Right atrial size is normal. There is  no color Doppler evidence of an atrial shunt.     Mitral Valve  There is mild mitral annular calcification.     Tricuspid Valve  The tricuspid valve is normal in structure and function.     Aortic Valve  The aortic valve is normal in structure and function.     Pulmonic Valve  The pulmonic valve is normal in structure and function.     Vessels  The ascending aorta is Borderline dilated. IVC diameter <2.1 cm collapsing  >50% with sniff suggests a normal RA pressure of 3 mmHg.     Pericardium  There is no pericardial effusion.     Rhythm  Sinus rhythm was  noted.  ______________________________________________________________________________  MMode/2D Measurements & Calculations     IVSd: 1.2 cm  LVIDd: 5.4 cm  LVIDs: 3.7 cm  LVPWd: 0.97 cm  FS: 31.8 %  LV mass(C)d: 225.7 grams  LV mass(C)dI: 109.5 grams/m2  Ao root diam: 3.0 cm  LA dimension: 5.0 cm  asc Aorta Diam: 3.6 cm  LA/Ao: 1.7  LA Volume (BP): 96.1 ml  LA Volume Index (BP): 46.7 ml/m2  RWT: 0.36     Doppler Measurements & Calculations  MV E max del: 108.0 cm/sec  MV A max del: 85.9 cm/sec  MV E/A: 1.3  MV dec slope: 515.4 cm/sec2  PA acc time: 0.12 sec  E/E' av.8  Lateral E/e': 11.1  Medial E/e': 16.5     ______________________________________________________________________________  Report approved by: Kirt Bran 2021 01:13 PM           *Note: Due to a large number of results and/or encounters for the requested time period, some results have not been displayed. A complete set of results can be found in Results Review.

## 2021-11-26 NOTE — PLAN OF CARE
Pt is A&Ox4, VSS on RA, IV removed, Dialysis diet, Ax1-2 w/ GB and walker, PINAB at baseline, managed pain w/ prn tylenol, AVS printed and given w/ meds, [facility] providing transportation home.

## 2021-11-29 ENCOUNTER — PATIENT OUTREACH (OUTPATIENT)
Dept: CARE COORDINATION | Facility: CLINIC | Age: 71
End: 2021-11-29
Payer: MEDICARE

## 2021-11-29 ENCOUNTER — MYC MEDICAL ADVICE (OUTPATIENT)
Dept: ENDOCRINOLOGY | Facility: CLINIC | Age: 71
End: 2021-11-29
Payer: MEDICARE

## 2021-11-29 ENCOUNTER — TELEPHONE (OUTPATIENT)
Dept: FAMILY MEDICINE | Facility: CLINIC | Age: 71
End: 2021-11-29
Payer: MEDICARE

## 2021-11-29 DIAGNOSIS — E11.22 TYPE 2 DIABETES MELLITUS WITH DIABETIC CHRONIC KIDNEY DISEASE, UNSPECIFIED CKD STAGE, UNSPECIFIED WHETHER LONG TERM INSULIN USE (H): Primary | ICD-10-CM

## 2021-11-29 DIAGNOSIS — E11.9 TYPE 2 DIABETES, HBA1C GOAL < 7% (H): ICD-10-CM

## 2021-11-29 RX ORDER — FLURBIPROFEN SODIUM 0.3 MG/ML
SOLUTION/ DROPS OPHTHALMIC
Qty: 400 EACH | Refills: 1 | Status: SHIPPED | OUTPATIENT
Start: 2021-11-29 | End: 2024-03-01

## 2021-11-29 NOTE — TELEPHONE ENCOUNTER
Reason for Call:  Home Health Care    Graciela with [facility] called regarding (reason for call): verbal orders    Orders are needed for this patient. YES    PT: Eval and Treat    OT: Eval and Treat    Skilled Nursin times a week for 1 week, 1 time a week for 3 weeks  And 3 PRN's    Pt Provider: Renetta    Phone Number Homecare Nurse can be reached at: [phone]    Can we leave a detailed message on this number? YES    Phone number patient can be reached at: Other phone number:  N/A*    Best Time: Today    Call taken on 2021 at 9:46 AM by Caridad Baxter

## 2021-11-29 NOTE — TELEPHONE ENCOUNTER
Called home care RN and gave OK for requested verbal orders.    Kamryn Bloom RN  [facility]

## 2021-11-29 NOTE — PROGRESS NOTES
Clinic Care Coordination Contact  UTC/Voicemail    Left VM on pt's phone and pt's daughter's, Caroline Sandoval's, phone this afternoon. CTC with Caroline Sandoval dated 10/19/15.    Chart review:  IP referral: Care Transition: Hospitalized 11/23/21-11/26/21 at [facility] for community acquired PNA/Diastolic CHF, acute hypoxic respiratory failure. Pt has Dx of ESRD with HD T/H/S. Referrals for Home care PT/OT/RN with [facility], [facility] medical for home O2, DME - WC, mattress overlay for hospital bed. Appointment with Dr. Jackson, pt's PCP, scheduled for 12/13/21. Transportation Resources requested. Pt lives in lower level of duplex, with daughter living on 2nd level of duplex. Pt uses a manual WC at home. [facility] is working on getting a hospital bed for patient at home.      Clinical Data: Care Coordinator Outreach  Outreach attempted x 1.  Left message on patient's and pt's daughter's, Caroline Sandoval's,  voicemail with call back information and requested return call.  Plan:  Care Coordinator will try to reach patient again in 1-2 business days.    Dina Valdes  Community Health Worker  [facility]  [phone]

## 2021-11-29 NOTE — LETTER
[facility] CARE COORDINATION  [facility]  [address]  [facility] [address]    November 30, 2021    Supriya Herr  [address]  [facility] [address]      Dear Luiz Gray,    I am a clinic community health worker who works with Noam Jackson MD at the [facility]. I wanted to thank you for spending the time to talk with me.  Below is a description of clinic care coordination and how I can further assist you.      The clinic care coordination team is made up of a registered nurse,  and community health worker who understand the health care system. The goal of clinic care coordination is to help you manage your health and improve access to the health care system in the most efficient manner. The team can assist you in meeting your health care goals by providing education, coordinating services, strengthening the communication among your providers and supporting you with any resource needs.    Please feel free to contact me at [phone] with any questions or concerns. We are focused on providing you with the highest-quality healthcare experience possible and that all starts with you.     Sincerely,   Dina Valdes  Community Health Worker  [facility], [place] [facility]  [phone]

## 2021-11-29 NOTE — TELEPHONE ENCOUNTER
Reason for Call:  Medication or medication refill:    Do you use a [facility]?  Name of the pharmacy and phone number for the current request:  [id number] - [place], [place] - [address]    Name of the medication requested: BD UF MICRO PEN NEEDLE [id number]    Other request: MEDICATION REQUEST COULD NOT FIND IN MED LIST    Can we leave a detailed message on this number? YES    Phone number patient can be reached at: Home number on file [phone] (home)    Best Time:  ANY    Call taken on 11/29/2021 at 8:19 AM by Hellen Bermeo

## 2021-11-29 NOTE — TELEPHONE ENCOUNTER
BD UF MICRO PEN NEEDLE sent to pharm for BG checks.     Thanks,  SHANTA Krause  [facility]

## 2021-11-30 NOTE — PROGRESS NOTES
Clinic Care Coordination Contact  Community Health Worker Initial Outreach    Spoke with pt's daughter, Caroline Sandoval, and pt as well.  CTC with aCroline Sandoval dated 10/19/15.    Chart review:  IP referral: Care Transition: Hospitalized 11/23/21-11/26/21 at [facility] for community acquired PNA/Diastolic CHF, acute hypoxic respiratory failure. Pt has Dx of ESRD with HD T/H/S. Referrals for Home care PT/OT/RN with [facility], [facility] medical for home O2, DME - WC, mattress overlay for hospital bed. Appointment with Dr. Jackson, pt's PCP, scheduled for 12/13/21. Transportation Resources requested. Pt lives in lower level of duplex, with daughter living on 2nd level of duplex. Pt uses a manual WC at home. [facility] is working on getting a hospital bed for patient at home.      CHW introduces the role of Care Coordination. Spoke initially to Caroline Gray, pt's daughter, and she feels that they are managing well at home currently. Home RN/OT/PT will be resuming soon. OP dialysis started again yesterday. Caroline Gray was able to independently provide transportation for pt to dialysis and it went well. They have tried medical transport in the past and it was very unreliable. Caroline Gray states they are trying to keep pt at home as long as possible. Caroline Gray, needed to attend a virtual meeting, so handed the phone to pt so CHW could as pt the TCM questions. Pt states she her SOB is improving.  Pt nor pt's daughter, Caroline Gray, are not interested in CC at this time.  Patient understands that she can contact Care Coordination or their primary care physician if there are any changes.    Caroline Gray did ask if she might be able to schedule a COVID booster at either the upcoming eye visit or foot visits. CHW was able to confirm that the [facility] ([phone]) does have appointments available on 12/8/21 from 11-2:45pm, 3:45-5:00pm. Sent this information to pt's [employer] Account along with the transportation  resources below.    [facility]: [phone] Medical transport: Accept medical assistance or private pay  Non wheelchair is $18 non wheelchair or $25 wheelchair $1.50  per mile    [employer]:[email or web address], [phone]    CHW Initial Information Gathering:  Referral Source: IP Report  Current living arrangement:: I live in a private home with family (Duplex with her daughter, Caroline Gray)  Type of residence:: Private home - no stairs (lower duplex)  Community Resources: OP Dialysis (home care RN/OT, [employer], [facility] )  Supplies used at home:: Oxygen Tubing/Supplies,Diabetic Supplies,Compression Stockings  Equipment Currently Used at Home: wheelchair, manual,shower chair  Informal Support system:: Family,Friends (dtr and sister)  No PCP office visit in Past Year: No  Transportation means:: Family (Tried a medical service recently and they never showed up, [employer] pass has )  CHW Additional Questions  If ED/Hospital discharge, follow-up appointment scheduled as recommended?: Yes  Medication changes made following ED/Hospital discharge?: Yes, patient to be scheduled with CC RN/SW within approx 1 business day  [facility] active?: Yes  Patient sent Social Determinants of Health questionnaire?: No    Patient accepts CC: No, Enrolled in home care. Patient will be sent Care Coordination introduction letter for future reference.      [facility]: Post-Discharge Note  SITUATION                                                      Admission:    Admission Date: 21   Reason for Admission: Community aquired pnemonia/diastolic CHF  Discharge:   Discharge Date: 21  Discharge Diagnosis: Acute hypoxic respiratory failure, RLL pneumonia    BACKGROUND                                                      Supriya Herr is a 71 year old female PMH including DMII, CKD, JONE, chronic diastolic CHF, PVD,  s/p traumatic left  AKA, psoriatic arthritis, ESRD on HD (T/Th/Sat), hx of COVID19 infection 8/2021, among other chronic comorbidities who presents to the ED with worsening shortness of breath/cough. Admitted 11/23/2021.     ASSESSMENT      Enrollment  Primary Care Care Coordination Status: Declined    Discharge Assessment  How are you doing now that you are home?: Pt states she still gets SOB but it has gotten getter. Dtr. states they needed to call 911 the day of discharge due to O2 levels going low-paramedics did not transport to the hospital.  How are your symptoms? (Red Flag symptoms escalate to triage hotline per guidelines): Improved  Do you feel your condition is stable enough to be safe at home until your provider visit?: Yes  Does the patient have their discharge instructions? : Yes  Does the patient have questions regarding their discharge instructions? : No  Were you started on any new medications or were there changes to any of your previous medications? : Yes  Does the patient have all of their medications?: Yes  Do you have questions regarding any of your medications? : No  Do you have all of your needed medical supplies or equipment (DME)?  (i.e. oxygen tank, CPAP, cane, etc.): Yes (Arabella Kamara PA-C, team is working to get pt insulin pen needle 31G X 6)  Discharge follow-up appointment scheduled within 14 calendar days? : Yes  Discharge Follow Up Appointment Date: 12/13/21 (Eye appt on 12/1/21 and foot appointment 12/8/21)  Discharge Follow Up Appointment Scheduled with?: Specialty Care Provider    Post-op (CHW CTA Only)  If the patient had a surgery or procedure, do they have any questions for a nurse?: No           PLAN                                                      Outpatient Plan:  Home with home O2 at night, home with home care, home with OP dialysis    Future Appointments   Date Time Provider Department Center   12/1/2021  3:10 PM Leanne Jett MD [facility]   12/8/2021  2:40 PM  Eleazar Pack DPM [facility]   12/9/2021  3:00 PM [facility]   12/13/2021  3:20 PM Noam [facility] MD [facility]   12/17/2021  3:30 PM Arabella Kamara PA-C [facility]         For any urgent concerns, please contact our 24 hour nurse triage line: [phone] ([phone])         Dina Valdes  Community Health Worker  [facility], [place] [facility]  [phone]

## 2021-12-01 ENCOUNTER — OFFICE VISIT (OUTPATIENT)
Dept: OPHTHALMOLOGY | Facility: CLINIC | Age: 71
End: 2021-12-01
Attending: OPHTHALMOLOGY
Payer: MEDICARE

## 2021-12-01 DIAGNOSIS — E11.3213 TYPE 2 DIABETES MELLITUS WITH MILD NONPROLIFERATIVE RETINOPATHY OF BOTH EYES AND MACULAR EDEMA, UNSPECIFIED WHETHER LONG TERM INSULIN USE (H): Primary | ICD-10-CM

## 2021-12-01 DIAGNOSIS — E11.3213 TYPE 2 DIABETES MELLITUS WITH MILD NONPROLIFERATIVE RETINOPATHY OF BOTH EYES AND MACULAR EDEMA, UNSPECIFIED WHETHER LONG TERM INSULIN USE (H): ICD-10-CM

## 2021-12-01 PROCEDURE — 92235 FLUORESCEIN ANGRPH MLTIFRAME: CPT | Performed by: OPHTHALMOLOGY

## 2021-12-01 PROCEDURE — G0463 HOSPITAL OUTPT CLINIC VISIT: HCPCS | Mod: 25

## 2021-12-01 PROCEDURE — 92012 INTRM OPH EXAM EST PATIENT: CPT | Performed by: OPHTHALMOLOGY

## 2021-12-01 PROCEDURE — 92134 CPTRZ OPH DX IMG PST SGM RTA: CPT | Performed by: OPHTHALMOLOGY

## 2021-12-01 ASSESSMENT — REFRACTION_WEARINGRX
OS_AXIS: 133
OD_SPHERE: -5.25
OD_CYLINDER: +1.50
OS_CYLINDER: +1.75
OS_CYLINDER: +1.75
OS_SPHERE: -4.00
OS_AXIS: 135
OD_AXIS: 093
OD_AXIS: 075
OS_SPHERE: -3.50
OD_SPHERE: -1.00
OD_CYLINDER: +1.00

## 2021-12-01 ASSESSMENT — TONOMETRY
OS_IOP_MMHG: 8
IOP_METHOD: TONOPEN
OD_IOP_MMHG: 9

## 2021-12-01 ASSESSMENT — SLIT LAMP EXAM - LIDS
COMMENTS: NORMAL
COMMENTS: NORMAL

## 2021-12-01 ASSESSMENT — CONF VISUAL FIELD
OS_INFERIOR_NASAL_RESTRICTION: 3
OS_INFERIOR_TEMPORAL_RESTRICTION: 3
OD_NORMAL: 1

## 2021-12-01 ASSESSMENT — CUP TO DISC RATIO
OS_RATIO: 0.3
OD_RATIO: 0.3

## 2021-12-01 ASSESSMENT — VISUAL ACUITY
OD_CC: 20/25
OS_CC+: +2
OD_CC+: -3
METHOD: SNELLEN - LINEAR
OS_CC: 20/30
CORRECTION_TYPE: GLASSES

## 2021-12-01 NOTE — NURSING NOTE
Chief Complaints and History of Present Illnesses   Patient presents with     Diabetic Retinopathy Follow Up     Chief Complaint(s) and History of Present Illness(es)     Diabetic Retinopathy Follow Up     Laterality: both eyes    Onset: months ago    Associated symptoms: tearing (OD only, on and off, possibly allergies).  Negative for floaters, flashes, discharge and eye pain    Pain scale: 0/10              Comments     4 month follow up for moderate NPDR and mild Diabetic macular edema each eye with testing today.   Patient states vision is stable each eye within the last few months. Denies new floaters or flashes. Denies eye pain. Patient notes intermittent tearing right eye only.     Last BS: 116 this morning  Lab Results       Component                Value               Date                       A1C                      6.2                 11/23/2021                 A1C                      6.2                 04/09/2021                 A1C                      6.0                 06/22/2018                 A1C                      5.4                 03/29/2018                 A1C                      6.8                 01/09/2018                 A1C                      9.6                 06/09/2017              Patient was in the hospital for Pneumonia. She was in 11/22/21-11/27/21. She was in twice on 11/22/21.     Ramila Bunch, COT 3:25 PM 12/01/2021

## 2021-12-01 NOTE — PROGRESS NOTES
CC: follow upDiabetic retinopathy   HPI: 71 year old here for NPDR and DME.     Interim: VA stable; no flashes and floaters   Recent hospitalized because of new  Pneumonia. Had covid this yr    Imaging:  Optical Coherence Tomography:12/01/21   right eye: microaneurysms and resolving cystoid macular edema.  lamellar hole, stable  Left eye: mild inferior IRF, MA - stable    fluorescein angiography: 12/01/21    Right eye: blockage of fluorescein angiography on the areas of heme; few microaneurysms; mild late Diabetic macular edema and PP leakage  No neovascularization elsewhere; no neovascularization of the disc.  Left eye: few microaneurysms; mild late Diabetic macular edema; staining of the peripheral Chorioretinal  scars  Stable    Assessment & Plan:  # Moderate nonproliferative diabetic retinopathy and mild - Diabetic macular edema both eyes  - mild stable edema in right eye, stable in left eye   Stable- observe  A1c 4/2021 6.2    # right eye - retinal macroaneurysm   at the sup temp margin of disc may contribute to macular edema;   - MA seems to be getting thrombosed   - status post avastin inj x2 for cystoid macular edema with improvement  - Last DOROTEO 8/15/19 (#1)  - now stable mild cystoid macular edema- will observe. Consider avastin if worsen    #. Mod Hypertensive retinopathy   - Stage 5 kidney disease   - blood pressure control has been poor in 160s/90s-100s    # Pseudophakia both eyes  8-11-20 right eye   Patient happy with her vision     #. History of Macula hole repair left eye by Dr. Daniel  S/p PPV, mp, air-fluid exchange, SF6 gas infusion, left eye 2/14/2012 by Dr. Daniel    Macula hole closed  Observe    # Dry eye syndrome   Status post punctal plugs   artificial tears  As needed and warm compresses     PLAN:  - Blood pressure (<120/80) and blood glucose (HbA1c <7.0) control discussed with patient.   - Patient advised that failure to adequately control each may lead to vision loss. The patient  expressed understanding.- improved on exam today     - follow up in 4 months with Optical Coherence Tomography  ~~~~~~~~~~~~~~~~~~~~~~~~~~~~~~~~~~   Complete documentation of historical and exam elements from today's encounter can be found in the full encounter summary report (not reduplicated in this progress note).  I personally obtained the chief complaint(s) and history of present illness.  I confirmed and edited as necessary the review of systems, past medical/surgical history, family history, social history, and examination findings as documented by others; and I examined the patient myself.  I personally reviewed the relevant tests, images, and reports as documented above.  I formulated and edited as necessary the assessment and plan and discussed the findings and management plan with the patient and family    Leanne Jett MD   of Ophthalmology.  Retina Service   Department of Ophthalmology and Visual Neurosciences   [facility]  Phone: [phone]   Fax: [phone]

## 2021-12-02 ENCOUNTER — MEDICAL CORRESPONDENCE (OUTPATIENT)
Dept: HEALTH INFORMATION MANAGEMENT | Facility: CLINIC | Age: 71
End: 2021-12-02
Payer: MEDICARE

## 2021-12-02 NOTE — PROGRESS NOTES
"[facility]    ASSESSMENT:   (L89.151) Pressure injury of sacral region, stage 1      PLAN/DISCUSSION:   1. Fortunately really no open wounds today except one small erosion. Issues sound like theyre mainly due to moisture and immobility. Treatment here is to prevent moisture and reposition more frequently.   2. Think evaluation of chair and cushion is very important, we will see if OT from [facility] can go out to patient sooner than her seating clinic visit in September.   3. Keep skin dry, apply drying powder BID and PRN. Can try different underwear if they are causing chafing. Possibly more \"boyshort\" style    HISTORY OF PRESENT ILLNESS:   Supriya Herr is a 71 year old female with complex medical history including HD dependent ESRD, and wheelchair dependence who presents today for a pain and sores on her buttocks.  She notes pain and issues with sores mainly in the creases beneath her buttocks. Has noticed this has worsened with the hot weather. Has occasional urinary leakage. Feels like edge of underwear irritates this area. Has appointment in September with the seating clinic to evaluate her cushion.     VITALS: /66   Pulse 60   Temp 96.9  F (36.1  C) (Temporal)   Ht 1.676 m (5' 6\")   Wt 116 kg (255 lb 11.7 oz)   BMI 41.28 kg/m       PHYSICAL EXAM:  GENERAL: Patient is alert and oriented and in no acute distress  INTEGUMENTARY: small erosion in gluteal cleft, hyper and hypopigmentation over bilateral ITs consistent with previous injury        MDM: 40 minutes were spent on the date of the visit reviewing previous chart notes, evaluating patient and developing the treatment plan, this excludes any time spent on procedures.     RUI HARRIS PA-C    "
Patient arrived for wound care visit. Registered Nurse time spent evaluating patient record, completed a full evaluation and documented wound(s) & maciel-wound skin; provided recommendation based on treatment plan. Applied dressing, reviewed discharge instructions, patient education, and discussed plan of care with appropriate medical team staff members and patient and/or family members.       
PCP

## 2021-12-03 ENCOUNTER — MEDICAL CORRESPONDENCE (OUTPATIENT)
Dept: HEALTH INFORMATION MANAGEMENT | Facility: CLINIC | Age: 71
End: 2021-12-03
Payer: MEDICARE

## 2021-12-08 ENCOUNTER — MEDICAL CORRESPONDENCE (OUTPATIENT)
Dept: HEALTH INFORMATION MANAGEMENT | Facility: CLINIC | Age: 71
End: 2021-12-08

## 2021-12-08 ENCOUNTER — DOCUMENTATION ONLY (OUTPATIENT)
Dept: TRANSPLANT | Facility: CLINIC | Age: 71
End: 2021-12-08

## 2021-12-08 ENCOUNTER — OFFICE VISIT (OUTPATIENT)
Dept: ORTHOPEDICS | Facility: CLINIC | Age: 71
End: 2021-12-08
Payer: MEDICARE

## 2021-12-08 DIAGNOSIS — B35.3 TINEA PEDIS OF RIGHT FOOT: ICD-10-CM

## 2021-12-08 DIAGNOSIS — B35.1 OM (ONYCHOMYCOSIS): ICD-10-CM

## 2021-12-08 DIAGNOSIS — L84 TYLOMA: ICD-10-CM

## 2021-12-08 DIAGNOSIS — E11.49 TYPE II OR UNSPECIFIED TYPE DIABETES MELLITUS WITH NEUROLOGICAL MANIFESTATIONS, NOT STATED AS UNCONTROLLED(250.60) (H): Primary | ICD-10-CM

## 2021-12-08 PROCEDURE — 99214 OFFICE O/P EST MOD 30 MIN: CPT | Performed by: PODIATRIST

## 2021-12-08 NOTE — LETTER
12/8/2021         RE: Supriya Herr  [address]  [place] [address]        Dear Colleague,    Thank you for referring your patient, Supriya Herr, to the [facility] [place]. Please see a copy of my visit note below.    Chief Complaint   Patient presents with     Follow Up     Foot care.                 Allergies   Allergen Reactions     Penicillins Rash     Unasyn Rash     No evidence SJS, but very uncomfortable and precipitated multiple provider visits. Would not use penicillins again if other options available.          Subjective: Supriya is a 69 year old female who presents to the clinic today for a diabetic foot exam and management.  Her nails do get long.  Recently was hospitalized 2/2 pneumonia.      Objective    Hemoglobin A1C   Date Value Ref Range Status   11/23/2021 6.2 (H) 0.0 - 5.6 % Final     Comment:     Normal <5.7%   Prediabetes 5.7-6.4%    Diabetes 6.5% or higher     Note: Adopted from ADA consensus guidelines.   04/09/2021 6.2 (H) 0 - 5.6 % Final     Comment:     Normal <5.7% Prediabetes 5.7-6.4%  Diabetes 6.5% or higher - adopted from ADA   consensus guidelines.           Non-palpable DP and PT pulses BL.   Equinus noted BL. Pes planus with rigid toe deformities noted to lesser digits on the right. Left AKA noted.   Nails are thickened, discolored, elongated, with subungual debris consistent with onychomycosis.          Assessment: DM2 with left AKA and neuropathy - presenting for a diabetic foot exam.   Onychomycosis.   Tyloma     Plan:   - Pt seen and evaluated  - Nails debrided x 5.  - Tyloma debrided x 1  - Cont compression socks.  - See again in 3 months.    Eleazar Pack, FRAN

## 2021-12-08 NOTE — NURSING NOTE
Reason For Visit:   Chief Complaint   Patient presents with     Follow Up     Foot care.        Pain Assessment  Patient Currently in Pain: Denies        Allergies   Allergen Reactions     Penicillins Rash     Unasyn Rash     No evidence SJS, but very uncomfortable and precipitated multiple provider visits. Would not use penicillins again if other options available.            Emmy Begum LPN

## 2021-12-09 ENCOUNTER — VIRTUAL VISIT (OUTPATIENT)
Dept: SLEEP MEDICINE | Facility: CLINIC | Age: 71
End: 2021-12-09
Payer: MEDICARE

## 2021-12-09 DIAGNOSIS — R06.83 SNORING: ICD-10-CM

## 2021-12-09 DIAGNOSIS — M25.561 CHRONIC PAIN OF BOTH KNEES: Primary | ICD-10-CM

## 2021-12-09 DIAGNOSIS — N18.6 ESRD ON DIALYSIS (H): ICD-10-CM

## 2021-12-09 DIAGNOSIS — Z86.69 HISTORY OF OBSTRUCTIVE SLEEP APNEA: Primary | ICD-10-CM

## 2021-12-09 DIAGNOSIS — G47.10 HYPERSOMNIA: ICD-10-CM

## 2021-12-09 DIAGNOSIS — M25.562 CHRONIC PAIN OF BOTH KNEES: Primary | ICD-10-CM

## 2021-12-09 DIAGNOSIS — Z99.2 ESRD ON DIALYSIS (H): ICD-10-CM

## 2021-12-09 DIAGNOSIS — G89.29 CHRONIC PAIN OF BOTH KNEES: Primary | ICD-10-CM

## 2021-12-09 DIAGNOSIS — M79.641 PAIN OF RIGHT HAND: ICD-10-CM

## 2021-12-09 PROCEDURE — 99203 OFFICE O/P NEW LOW 30 MIN: CPT | Mod: 95 | Performed by: INTERNAL MEDICINE

## 2021-12-09 RX ORDER — CICLOPIROX OLAMINE 7.7 MG/G
CREAM TOPICAL 2 TIMES DAILY
Qty: 90 G | Refills: 11 | Status: SHIPPED | OUTPATIENT
Start: 2021-12-09 | End: 2024-03-01

## 2021-12-09 RX ORDER — GABAPENTIN 100 MG/1
100 CAPSULE ORAL 4 TIMES DAILY
Qty: 120 CAPSULE | Refills: 11 | Status: SHIPPED | OUTPATIENT
Start: 2021-12-09 | End: 2023-09-26

## 2021-12-09 ASSESSMENT — ENCOUNTER SYMPTOMS
DYSPNEA ON EXERTION: 1
TINGLING: 1
SLEEP DISTURBANCES DUE TO BREATHING: 1
BLOOD IN STOOL: 0
SINUS CONGESTION: 1
SEIZURES: 0
TROUBLE SWALLOWING: 0
HEADACHES: 1
NUMBNESS: 1
TINGLING: 1
NERVOUS/ANXIOUS: 1
JAUNDICE: 0
SPEECH CHANGE: 0
HYPOTENSION: 0
BLOATING: 0
JAUNDICE: 0
MEMORY LOSS: 1
EXERCISE INTOLERANCE: 0
SHORTNESS OF BREATH: 1
EYE IRRITATION: 1
WEAKNESS: 1
DEPRESSION: 1
BOWEL INCONTINENCE: 0
TASTE DISTURBANCE: 0
COUGH: 0
DEPRESSION: 1
NAIL CHANGES: 0
SPUTUM PRODUCTION: 1
DIARRHEA: 1
EYE PAIN: 0
MUSCLE CRAMPS: 1
COUGH: 0
SINUS PAIN: 1
ABDOMINAL PAIN: 0
EYE PAIN: 0
TASTE DISTURBANCE: 0
LEG PAIN: 1
CONSTIPATION: 1
DECREASED CONCENTRATION: 1
ABDOMINAL PAIN: 0
HYPERTENSION: 0
SYNCOPE: 0
PANIC: 1
HYPERTENSION: 0
BACK PAIN: 1
PALPITATIONS: 0
BACK PAIN: 1
CONSTIPATION: 1
EYE WATERING: 1
SNORES LOUDLY: 1
WEAKNESS: 1
DOUBLE VISION: 0
SPUTUM PRODUCTION: 1
EYE REDNESS: 0
HEADACHES: 1
STIFFNESS: 1
LIGHT-HEADEDNESS: 1
POOR WOUND HEALING: 0
MYALGIAS: 1
SLEEP DISTURBANCES DUE TO BREATHING: 1
SYNCOPE: 0
POOR WOUND HEALING: 0
STIFFNESS: 1
PALPITATIONS: 0
VOMITING: 0
POSTURAL DYSPNEA: 1
SKIN CHANGES: 0
INSOMNIA: 1
PANIC: 1
NECK PAIN: 1
SNORES LOUDLY: 1
RECTAL PAIN: 0
SPEECH CHANGE: 0
NUMBNESS: 1
SINUS PAIN: 1
ARTHRALGIAS: 1
HOARSE VOICE: 0
NECK MASS: 0
NAIL CHANGES: 0
DYSPNEA ON EXERTION: 1
TROUBLE SWALLOWING: 0
HEARTBURN: 0
MUSCLE CRAMPS: 1
HEMOPTYSIS: 0
EYE REDNESS: 0
SMELL DISTURBANCE: 0
MUSCLE WEAKNESS: 1
DIZZINESS: 1
HOARSE VOICE: 0
NECK PAIN: 1
COUGH DISTURBING SLEEP: 0
DIZZINESS: 1
SEIZURES: 0
SORE THROAT: 0
NECK MASS: 0
SKIN CHANGES: 0
LIGHT-HEADEDNESS: 1
TREMORS: 0
NERVOUS/ANXIOUS: 1
PARALYSIS: 0
NAUSEA: 0
NAUSEA: 0
PARALYSIS: 0
INSOMNIA: 1
ORTHOPNEA: 1
POSTURAL DYSPNEA: 1
COUGH DISTURBING SLEEP: 0
BLOATING: 0
SINUS CONGESTION: 1
SHORTNESS OF BREATH: 1
MYALGIAS: 1
SMELL DISTURBANCE: 0
WHEEZING: 1
DECREASED CONCENTRATION: 1
DOUBLE VISION: 0
HEARTBURN: 0
MEMORY LOSS: 1
HEMOPTYSIS: 0
MUSCLE WEAKNESS: 1
DISTURBANCES IN COORDINATION: 1
EYE IRRITATION: 1
DISTURBANCES IN COORDINATION: 1
TREMORS: 0
SORE THROAT: 0
LOSS OF CONSCIOUSNESS: 0
HYPOTENSION: 0
RECTAL PAIN: 0
LOSS OF CONSCIOUSNESS: 0
VOMITING: 0
EYE WATERING: 1
DIARRHEA: 1
LEG PAIN: 1
WHEEZING: 1
ARTHRALGIAS: 1
ORTHOPNEA: 1
BOWEL INCONTINENCE: 0
EXERCISE INTOLERANCE: 0
BLOOD IN STOOL: 0

## 2021-12-09 ASSESSMENT — SLEEP AND FATIGUE QUESTIONNAIRES
HOW LIKELY ARE YOU TO NOD OFF OR FALL ASLEEP WHILE WATCHING TV: MODERATE CHANCE OF DOZING
HOW LIKELY ARE YOU TO NOD OFF OR FALL ASLEEP WHILE SITTING AND READING: MODERATE CHANCE OF DOZING
HOW LIKELY ARE YOU TO NOD OFF OR FALL ASLEEP WHILE LYING DOWN TO REST IN THE AFTERNOON WHEN CIRCUMSTANCES PERMIT: MODERATE CHANCE OF DOZING
HOW LIKELY ARE YOU TO NOD OFF OR FALL ASLEEP WHILE SITTING AND TALKING TO SOMEONE: SLIGHT CHANCE OF DOZING
HOW LIKELY ARE YOU TO NOD OFF OR FALL ASLEEP WHEN YOU ARE A PASSENGER IN A CAR FOR AN HOUR WITHOUT A BREAK: SLIGHT CHANCE OF DOZING
HOW LIKELY ARE YOU TO NOD OFF OR FALL ASLEEP WHILE SITTING QUIETLY AFTER LUNCH WITHOUT ALCOHOL: SLIGHT CHANCE OF DOZING
HOW LIKELY ARE YOU TO NOD OFF OR FALL ASLEEP IN A CAR, WHILE STOPPED FOR A FEW MINUTES IN TRAFFIC: WOULD NEVER DOZE
HOW LIKELY ARE YOU TO NOD OFF OR FALL ASLEEP WHILE SITTING INACTIVE IN A PUBLIC PLACE: MODERATE CHANCE OF DOZING
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
## 2021-12-09 NOTE — PATIENT INSTRUCTIONS
Patient education: What is a sleep study?     What is a sleep study? -- A sleep study is a test that measures how well you sleep and checks for sleep problems. For some sleep studies, you stay overnight in a sleep lab at a hospital or sleep center.     What happens during a sleep study? -- Before you go to sleep, a technician attaches small, sticky patches called  electrodes  to your head, chest, and legs. He or she will also place a small tube beneath your nose and might wrap 1 or 2 belts around your chest.   Each of these items has wires that connect to monitors. The monitors record your movement, brain activity, breathing, and other body functions while you sleep.  If you have a history of trouble falling asleep, your doctor might prescribe a medicine to help you fall asleep in the lab. If you have never taken the medicine before, your doctor might ask you take it on a night before your sleep study to see how it affects you.   Why might my doctor order a sleep study? -- Your doctor will order a sleep study if he or she thinks you have sleep apnea or a different condition that makes you:   ?Have sudden jerking leg movements while you sleep, called  periodic limb movements.    ?Feel very sleepy during the day and fall asleep all of a sudden, called  narcolepsy.    ?Have trouble falling asleep or staying asleep over a long period of time, called  chronic insomnia.    ?Do odd things while you sleep, such as walking.  How should I prepare for a sleep study? -- On the day of your sleep study, you should:   ?Avoid alcohol   ?Avoid drinking coffee, tea, sodas, and other drinks that have caffeine in the afternoon and evening   ?Take all of your regular medicines     The cost of care estimate line is [phone]. They are able to give the patient an estimate of the charges and also an estimate of their insurance coverage/patient responsibility.   After your sleep study is performed, please call us at [phone] or  [phone]  to schedule for a follow up to review the results of the sleep study.

## 2021-12-09 NOTE — PROGRESS NOTES
Chief Complaint   Patient presents with     Follow Up     Foot care.                 Allergies   Allergen Reactions     Penicillins Rash     Unasyn Rash     No evidence SJS, but very uncomfortable and precipitated multiple provider visits. Would not use penicillins again if other options available.          Subjective: Supriya is a 69 year old female who presents to the clinic today for a diabetic foot exam and management.  Her nails do get long.  Recently was hospitalized 2/2 pneumonia.      Objective    Hemoglobin A1C   Date Value Ref Range Status   11/23/2021 6.2 (H) 0.0 - 5.6 % Final     Comment:     Normal <5.7%   Prediabetes 5.7-6.4%    Diabetes 6.5% or higher     Note: Adopted from ADA consensus guidelines.   04/09/2021 6.2 (H) 0 - 5.6 % Final     Comment:     Normal <5.7% Prediabetes 5.7-6.4%  Diabetes 6.5% or higher - adopted from ADA   consensus guidelines.           Non-palpable DP and PT pulses BL.   Equinus noted BL. Pes planus with rigid toe deformities noted to lesser digits on the right. Left AKA noted.   Nails are thickened, discolored, elongated, with subungual debris consistent with onychomycosis.          Assessment: DM2 with left AKA and neuropathy - presenting for a diabetic foot exam.   Onychomycosis.   Tyloma     Plan:   - Pt seen and evaluated  - Nails debrided x 5.  - Tyloma debrided x 1  - Cont compression socks.  - See again in 3 months.

## 2021-12-13 ENCOUNTER — MEDICAL CORRESPONDENCE (OUTPATIENT)
Dept: HEALTH INFORMATION MANAGEMENT | Facility: CLINIC | Age: 71
End: 2021-12-13

## 2021-12-13 ENCOUNTER — VIRTUAL VISIT (OUTPATIENT)
Dept: FAMILY MEDICINE | Facility: CLINIC | Age: 71
End: 2021-12-13
Payer: MEDICARE

## 2021-12-13 DIAGNOSIS — N18.6 ESRD (END STAGE RENAL DISEASE) ON DIALYSIS (H): ICD-10-CM

## 2021-12-13 DIAGNOSIS — J12.82 PNEUMONIA DUE TO COVID-19 VIRUS: ICD-10-CM

## 2021-12-13 DIAGNOSIS — Z99.2 ESRD (END STAGE RENAL DISEASE) ON DIALYSIS (H): ICD-10-CM

## 2021-12-13 DIAGNOSIS — Z99.3 WHEELCHAIR BOUND: ICD-10-CM

## 2021-12-13 DIAGNOSIS — U07.1 PNEUMONIA DUE TO COVID-19 VIRUS: ICD-10-CM

## 2021-12-13 DIAGNOSIS — J18.9 PNEUMONIA OF RIGHT LOWER LOBE DUE TO INFECTIOUS ORGANISM: Primary | ICD-10-CM

## 2021-12-13 PROCEDURE — 99214 OFFICE O/P EST MOD 30 MIN: CPT | Mod: 95 | Performed by: FAMILY MEDICINE

## 2021-12-13 NOTE — PROGRESS NOTES
.  Supriya is a 71 year old who is being evaluated via a billable video visit.      How would you like to obtain your AVS? MyChart  If the video visit is dropped, the invitation should be resent by: Text to cell phone: done  Will anyone else be joining your video visit? Yes: Daughter. How would they like to receive their invitation? Text to cell phone: done      Video Start Time: 300    Assessment & Plan     Pneumonia of right lower lobe due to infectious organism  Slowly improving, uses O2 at night somwtimes    Pneumonia due to COVID-19 virus  Improving    ESRD (end stage renal disease) on dialysis (H)  neeeds a hospital bed for for some reason Home care still will not ptovide it, her daughter will look into this  she requires a semi electric hospital bed with 1/2 side rails and mattress.   Supriya requires postioning of he body in ways that are not feasible in an ordinary bed in order to help alleviate her chronic pain and treat/ prevent further diabetic ulcers.   She requires a bed height different from a fixed bed height that I adjustable to allow for slide board transfers due to her above the knee amputation.  Also due to her CHF requires the head of the neb to be elevated mor edy 30 degrees.. She also requires frequent changes in body position ( and at times has an immediate/ urgent need to change body positions  due to pain. Currently her daughter has to assist her to position comfortably and this is not sustainable        So what we are asking for is lifetime us e of a semi electric bed , 1/2 side rails and mattress   her Height is  5' 7, her weight 210    Wheelchair bound  As above      Review of external notes as documented elsewhere in note  12 minutes spent on the date of the encounter doing review of test results and interpretation of tests            No follow-ups on file.    Noam Jackson MD  [facility]    Jean Carlos Epps is a 71 year old who presents for the  following health issues     HPI     Post Discharge Outreach 11/30/2021   Admission Date 11/23/2021   Reason for Admission Community aquired pnemonia/diastolic CHF   Discharge Date 11/26/2021   Discharge Diagnosis Acute hypoxic respiratory failure, RLL pneumonia   How are you doing now that you are home? Pt states she still gets SOB but it has gotten getter. Dtr. states they needed to call 911 the day of discharge due to O2 levels going low-paramedics did not transport to the hospital.   How are your symptoms? (Red Flag symptoms escalate to triage hotline per guidelines) Improved   Do you feel your condition is stable enough to be safe at home until your provider visit? Yes   Does the patient have their discharge instructions?  Yes   Does the patient have questions regarding their discharge instructions?  No   Were you started on any new medications or were there changes to any of your previous medications?  Yes   Does the patient have all of their medications? Yes   Do you have questions regarding any of your medications?  No   Do you have all of your needed medical supplies or equipment (DME)?  (i.e. oxygen tank, CPAP, cane, etc.) Yes   Discharge follow-up appointment scheduled within 14 calendar days?  Yes   Discharge Follow Up Appointment Date 12/13/2021   Discharge Follow Up Appointment Scheduled with? Specialty Care Provider     Hospital Follow-up Visit:    Hospital/Nursing Home/IP Rehab Facility: [facility]         Was your hospitalization related to COVID-19? YES   How are you feeling today? Much better  In the past 24 hours have you had shortness of breath when speaking, walking, or climbing stairs? My breathing issues have improved  Do you have a cough? Yes, I have a cough but it's not worse  When is the last time you had a fever greater than 100? days  Are you having any other symptoms? Fatigue   Do you have any other stressors you would like to discuss with your provider? OTHER:  see note                      Problems taking medications regularly:  None  Medication changes since discharge: None  Problems adhering to non-medication therapy:  Hard to wear O2    Summary of hospitalization:  [facility] discharge summary reviewed  Diagnostic Tests/Treatments reviewed.  Follow up needed: with specialsit  Other Healthcare Providers Involved in Patient s Care:         Homecare  Update since discharge: improved.       Post Discharge Medication Reconciliation: discharge medications reconciled, continue medications without change.  Plan of care communicated with patient, family and caregiver                Review of Systems   Constitutional, HEENT, cardiovascular, pulmonary, gi and gu systems are negative, except as otherwise noted.      Objective           Vitals:  No vitals were obtained today due to virtual visit.    Physical Exam   GENERAL: alert, frail, elderly and fatigued  EYES: Eyes grossly normal to inspection.  No discharge or erythema, or obvious scleral/conjunctival abnormalities.  RESP: No audible wheeze, cough, or visible cyanosis.  No visible retractions or increased work of breathing.    NEURO: Cranial nerves grossly intact.  Mentation and speech appropriate for age.  PSYCH: Mentation appears normal, affect normal/bright, judgement and insight intact, normal speech and appearance well-groomed.    Admission on 11/23/2021, Discharged on 11/26/2021   Component Date Value Ref Range Status     Hold Specimen 11/23/2021 [facility]   Final     Sodium 11/23/2021 139  133 - 144 mmol/L Final     Potassium 11/23/2021 6.6* 3.4 - 5.3 mmol/L Final     Chloride 11/23/2021 109  94 - 109 mmol/L Final     Carbon Dioxide (CO2) 11/23/2021 23  20 - 32 mmol/L Final     Anion Gap 11/23/2021 7  3 - 14 mmol/L Final     Urea Nitrogen 11/23/2021 69* 7 - 30 mg/dL Final     Creatinine 11/23/2021 6.43* 0.52 - 1.04 mg/dL Final     Calcium 11/23/2021 9.3  8.5 - 10.1 mg/dL Final     Glucose 11/23/2021 94  70 -  99 mg/dL Final     GFR Estimate 11/23/2021 6* >60 mL/min/1.73m2 Final    As of July 11, 2021, eGFR is calculated by the CKD-EPI creatinine equation, without race adjustment. eGFR can be influenced by muscle mass, exercise, and diet. The reported eGFR is an estimation only and is only applicable if the renal function is stable.     Ventricular Rate 11/23/2021 74  BPM Final     Atrial Rate 11/23/2021 74  BPM Final     QRS Duration 11/23/2021 80  ms Final     QT 11/23/2021 404  ms Final     QTc 11/23/2021 448  ms Final     R AXIS 11/23/2021 70  degrees Final     T Axis 11/23/2021 57  degrees Final     Interpretation ECG 11/23/2021    Final                    Value:Accelerated Junctional rhythm  ST & T wave abnormality, consider anterior ischemia  Abnormal ECG  When compared with ECG of 22-NOV-2021 19:48,  Junctional rhythm has replaced Sinus rhythm  ST no longer depressed in Anterior leads  Confirmed by GENERATED REPORT, COMPUTER ([id number]),  Jerardo Palomares ([id number]) on 11/23/2021 6:59:02 AM       N terminal Pro BNP Inpatient 11/23/2021 17,695* 0 - 900 pg/mL Final    Reference range shown and results flagged as abnormal are suggested inpatient cut points for confirming diagnosis if CHF in an acute setting. Establishing a baseline value for each individual patient is useful for follow-up. An inpatient or emergency department NT-proPBNP <300 pg/mL effectively rules out acute CHF, with 99% negative predictive value.    The outpatient non-acute reference range for ruling out CHF is:  0-125 pg/mL (age 18 to less than 75)  0-450 pg/mL (age 75 yrs and older)      WBC Count 11/23/2021 10.1  4.0 - 11.0 10e3/uL Final     RBC Count 11/23/2021 3.13* 3.80 - 5.20 10e6/uL Final     Hemoglobin 11/23/2021 10.0* 11.7 - 15.7 g/dL Final     Hematocrit 11/23/2021 33.0* 35.0 - 47.0 % Final     MCV 11/23/2021 105* 78 - 100 fL Final     MCH 11/23/2021 31.9  26.5 - 33.0 pg Final     MCHC 11/23/2021 30.3* 31.5 - 36.5 g/dL Final     RDW 11/23/2021  12.6  10.0 - 15.0 % Final     Platelet Count 11/23/2021 184  150 - 450 10e3/uL Final     % Neutrophils 11/23/2021 77  % Final     % Lymphocytes 11/23/2021 15  % Final     % Monocytes 11/23/2021 7  % Final     % Eosinophils 11/23/2021 1  % Final     % Basophils 11/23/2021 0  % Final     % Immature Granulocytes 11/23/2021 0  % Final     NRBCs per 100 WBC 11/23/2021 0  <1 /100 Final     Absolute Neutrophils 11/23/2021 7.7  1.6 - 8.3 10e3/uL Final     Absolute Lymphocytes 11/23/2021 1.5  0.8 - 5.3 10e3/uL Final     Absolute Monocytes 11/23/2021 0.7  0.0 - 1.3 10e3/uL Final     Absolute Eosinophils 11/23/2021 0.1  0.0 - 0.7 10e3/uL Final     Absolute Basophils 11/23/2021 0.0  0.0 - 0.2 10e3/uL Final     Absolute Immature Granulocytes 11/23/2021 0.0  <=0.0 10e3/uL Final     Absolute NRBCs 11/23/2021 0.0  10e3/uL Final     LVEF  11/24/2021 60-65%   Final     Hemoglobin A1C 11/23/2021 6.2* 0.0 - 5.6 % Final    Normal <5.7%   Prediabetes 5.7-6.4%    Diabetes 6.5% or higher     Note: Adopted from ADA consensus guidelines.     GLUCOSE BY METER POCT 11/23/2021 77  70 - 99 mg/dL Final     GLUCOSE BY METER POCT 11/23/2021 179* 70 - 99 mg/dL Final     GLUCOSE BY METER POCT 11/23/2021 225* 70 - 99 mg/dL Final     Sodium 11/24/2021 143  133 - 144 mmol/L Final     Potassium 11/24/2021 3.8  3.4 - 5.3 mmol/L Final     Chloride 11/24/2021 106  94 - 109 mmol/L Final     Carbon Dioxide (CO2) 11/24/2021 31  20 - 32 mmol/L Final     Anion Gap 11/24/2021 6  3 - 14 mmol/L Final     Urea Nitrogen 11/24/2021 37* 7 - 30 mg/dL Final     Creatinine 11/24/2021 4.39* 0.52 - 1.04 mg/dL Final     Calcium 11/24/2021 8.6  8.5 - 10.1 mg/dL Final     Glucose 11/24/2021 78  70 - 99 mg/dL Final     GFR Estimate 11/24/2021 9* >60 mL/min/1.73m2 Final    As of July 11, 2021, eGFR is calculated by the CKD-EPI creatinine equation, without race adjustment. eGFR can be influenced by muscle mass, exercise, and diet. The reported eGFR is an estimation only and is  only applicable if the renal function is stable.     N terminal Pro BNP Inpatient 11/24/2021 20,848* 0 - 900 pg/mL Final    Reference range shown and results flagged as abnormal are suggested inpatient cut points for confirming diagnosis if CHF in an acute setting. Establishing a baseline value for each individual patient is useful for follow-up. An inpatient or emergency department NT-proPBNP <300 pg/mL effectively rules out acute CHF, with 99% negative predictive value.    The outpatient non-acute reference range for ruling out CHF is:  0-125 pg/mL (age 18 to less than 75)  0-450 pg/mL (age 75 yrs and older)      GLUCOSE BY METER POCT 11/24/2021 87  70 - 99 mg/dL Final     GLUCOSE BY METER POCT 11/24/2021 100* 70 - 99 mg/dL Final     WBC Count 11/24/2021 7.0  4.0 - 11.0 10e3/uL Final     RBC Count 11/24/2021 2.87* 3.80 - 5.20 10e6/uL Final     Hemoglobin 11/24/2021 9.2* 11.7 - 15.7 g/dL Final     Hematocrit 11/24/2021 29.7* 35.0 - 47.0 % Final     MCV 11/24/2021 104* 78 - 100 fL Final     MCH 11/24/2021 32.1  26.5 - 33.0 pg Final     MCHC 11/24/2021 31.0* 31.5 - 36.5 g/dL Final     RDW 11/24/2021 12.5  10.0 - 15.0 % Final     Platelet Count 11/24/2021 145* 150 - 450 10e3/uL Final     % Neutrophils 11/24/2021 59  % Final     % Lymphocytes 11/24/2021 23  % Final     % Monocytes 11/24/2021 15  % Final     % Eosinophils 11/24/2021 2  % Final     % Basophils 11/24/2021 0  % Final     % Immature Granulocytes 11/24/2021 1  % Final     NRBCs per 100 WBC 11/24/2021 0  <1 /100 Final     Absolute Neutrophils 11/24/2021 4.1  1.6 - 8.3 10e3/uL Final     Absolute Lymphocytes 11/24/2021 1.6  0.8 - 5.3 10e3/uL Final     Absolute Monocytes 11/24/2021 1.1  0.0 - 1.3 10e3/uL Final     Absolute Eosinophils 11/24/2021 0.2  0.0 - 0.7 10e3/uL Final     Absolute Basophils 11/24/2021 0.0  0.0 - 0.2 10e3/uL Final     Absolute Immature Granulocytes 11/24/2021 0.0  <=0.0 10e3/uL Final     Absolute NRBCs 11/24/2021 0.0  10e3/uL Final      GLUCOSE BY METER POCT 11/24/2021 99  70 - 99 mg/dL Final     Hepatitis B Surface Antibody 11/24/2021 0.73  <8.00 m[IU]/mL Final    Nonreactive, No antibody detected when the value is less than 8.00 mIU/mL.     Hepatitis B Surface Antigen 11/24/2021 Nonreactive  Nonreactive Final     GLUCOSE BY METER POCT 11/24/2021 154* 70 - 99 mg/dL Final    Dr/RN Notified     GLUCOSE BY METER POCT 11/24/2021 125* 70 - 99 mg/dL Final     GLUCOSE BY METER POCT 11/25/2021 235* 70 - 99 mg/dL Final     GLUCOSE BY METER POCT 11/25/2021 114* 70 - 99 mg/dL Final     GLUCOSE BY METER POCT 11/25/2021 163* 70 - 99 mg/dL Final     GLUCOSE BY METER POCT 11/25/2021 213* 70 - 99 mg/dL Final     GLUCOSE BY METER POCT 11/25/2021 138* 70 - 99 mg/dL Final     GLUCOSE BY METER POCT 11/26/2021 97  70 - 99 mg/dL Final     GLUCOSE BY METER POCT 11/26/2021 83  70 - 99 mg/dL Final     GLUCOSE BY METER POCT 11/26/2021 116* 70 - 99 mg/dL Final               Video-Visit Details    Type of service:  Video Visit    Video End Time:330    Originating Location (pt. Location): Home    Distant Location (provider location):  [facility]     Platform used for Video Visit: [email or web address]

## 2021-12-17 ENCOUNTER — VIRTUAL VISIT (OUTPATIENT)
Dept: ENDOCRINOLOGY | Facility: CLINIC | Age: 71
End: 2021-12-17
Payer: MEDICARE

## 2021-12-17 DIAGNOSIS — Z79.4 TYPE 2 DIABETES MELLITUS WITH HYPERGLYCEMIA, WITH LONG-TERM CURRENT USE OF INSULIN (H): Primary | ICD-10-CM

## 2021-12-17 DIAGNOSIS — E11.65 TYPE 2 DIABETES MELLITUS WITH HYPERGLYCEMIA, WITH LONG-TERM CURRENT USE OF INSULIN (H): Primary | ICD-10-CM

## 2021-12-17 PROCEDURE — 99215 OFFICE O/P EST HI 40 MIN: CPT | Mod: 95 | Performed by: PHYSICIAN ASSISTANT

## 2021-12-17 RX ORDER — PEN NEEDLE, DIABETIC 32GX 5/32"
NEEDLE, DISPOSABLE MISCELLANEOUS
Qty: 400 EACH | Refills: 3 | Status: SHIPPED | OUTPATIENT
Start: 2021-12-17 | End: 2023-02-13
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
## 2021-12-17 NOTE — PROGRESS NOTES
Supriya Herr  is being evaluated via a billable video visit.      How would you like to obtain your AVS? XTWIP  For the video visit, send the invitation by: Text to cell phone: [phone]  Will anyone else be joining your video visit? Yes: daughter . How would they like to receive their invitation? Text to cell phone: [phone]

## 2021-12-18 DIAGNOSIS — F41.1 GAD (GENERALIZED ANXIETY DISORDER): Primary | ICD-10-CM

## 2021-12-19 RX ORDER — SERTRALINE HYDROCHLORIDE 25 MG/1
TABLET, FILM COATED ORAL
Qty: 180 TABLET | Refills: 6 | Status: SHIPPED | OUTPATIENT
Start: 2021-12-19 | End: 2023-11-07

## 2021-12-20 ENCOUNTER — TELEPHONE (OUTPATIENT)
Dept: FAMILY MEDICINE | Facility: CLINIC | Age: 71
End: 2021-12-20
Payer: MEDICARE

## 2021-12-20 NOTE — TELEPHONE ENCOUNTER
Reason for Call:  Form, our goal is to have forms completed with 72 hours, however, some forms may require a visit or additional information.    Type of letter, form or note:  medical-REQUEST FOR ORDERS TO SUPPORT NEED FOR MEDICAL BED    Who is the form from?: Home care    Where did the form come from: form was faxed in    What clinic location was the form placed at?: [facility]    Where the form was placed:  Box/Folder    What number is listed as a contact on the form?:   F [phone]       Additional comments: Please send documentation to meet the insurance requirements for a hospital bed. Please fax back orders to [facility] at [phone]    Call taken on 12/20/2021 at 1:17 PM by Kat Corrales

## 2021-12-26 ENCOUNTER — TELEPHONE (OUTPATIENT)
Dept: FAMILY MEDICINE | Facility: CLINIC | Age: 71
End: 2021-12-26
Payer: MEDICARE

## 2021-12-26 DIAGNOSIS — L97.512 PLANTAR ULCER OF RIGHT FOOT WITH FAT LAYER EXPOSED (H): ICD-10-CM

## 2021-12-26 DIAGNOSIS — E11.40 TYPE 2 DIABETES MELLITUS WITH DIABETIC NEUROPATHY, WITH LONG-TERM CURRENT USE OF INSULIN (H): ICD-10-CM

## 2021-12-26 DIAGNOSIS — G89.29 OTHER CHRONIC PAIN: ICD-10-CM

## 2021-12-26 DIAGNOSIS — Z79.4 TYPE 2 DIABETES MELLITUS WITH DIABETIC NEUROPATHY, WITH LONG-TERM CURRENT USE OF INSULIN (H): ICD-10-CM

## 2021-12-26 DIAGNOSIS — Z89.612 HX OF AKA (ABOVE KNEE AMPUTATION), LEFT (H): Primary | ICD-10-CM

## 2021-12-26 DIAGNOSIS — I50.20 SYSTOLIC CONGESTIVE HEART FAILURE, UNSPECIFIED HF CHRONICITY (H): ICD-10-CM

## 2021-12-26 NOTE — LETTER
[facility]  [address]  [address]  [facility] [id number] [phone]          December 26, 2021    RE:  Supriya Herr                                                                                                                                                       [address]  [facility] [address]            To whom it may concern:    Supriya Herr is under my professional care for    Hx of AKA (above knee amputation), left (H)  Other chronic pain  Systolic congestive heart failure, unspecified HF chronicity (H)  Type 2 diabetes mellitus with diabetic neuropathy, with long-term current use of insulin (H)  Plantar ulcer of right foot with fat layer exposed (H)      To to these and other steve issues she requires a semi electric hospital bed with 1/2 side rails and mattress.   Supriya requires postioning of he body in ways that are not feasible in an ordinary bed in order to help alleviate her chronic pain and treat/ prevent further diabetic ulcers.   She requires a bed height different from a fixed bed height that I adjustable to allow for slide board transfers due to her above the knee amputation.  Also due to her CHF requires the head of the neb to be elevated mor edy 30 degrees.. She also requires frequent changes in body position ( and at times has an immediate/ urgent need to change body positions  due to pain. Currently her daughter has to assist her to position comfortably and this is not sustainable      So what we are asking for is lifetime us e of a semi electric bed , 1/2 side rails and mattress   her Height is  5' 7, her weight 210      Sincerely,        Noam Jackson MD

## 2021-12-27 ENCOUNTER — MEDICAL CORRESPONDENCE (OUTPATIENT)
Dept: HEALTH INFORMATION MANAGEMENT | Facility: CLINIC | Age: 71
End: 2021-12-27
Payer: MEDICARE

## 2021-12-27 ENCOUNTER — TELEPHONE (OUTPATIENT)
Dept: FAMILY MEDICINE | Facility: CLINIC | Age: 71
End: 2021-12-27
Payer: MEDICARE

## 2021-12-27 NOTE — TELEPHONE ENCOUNTER
Pt's home care OT calling to check on status of this form:    Requesting addendum to Dr. Jackson's visit notes from 12/13/21 as well - on the form Jalyn wrote in what the visit notes need to be addended with. When addended, visit notes and order for bed needs to be printed and faxed with form to [facility] at [phone]    Kamryn Bloom RN  [facility]

## 2021-12-27 NOTE — TELEPHONE ENCOUNTER
Reason for Call: Request for an order or referral:    Order or referral being requested: Home Care     Date needed: as soon as possible    Has the patient been seen by the PCP for this problem? YES    Additional comments: Kati from [facility] is calling to obtain Home Care Orders from Dr. Jackson. She only needs verbal orders for PT and Skilled Nursing.     Phone number Patient can be reached at:  Other phone number:  [phone]    Best Time:  Anytime    Can we leave a detailed message on this number?  YES    Call taken on 12/27/2021 at 12:56 PM by Faina Olsen

## 2021-12-27 NOTE — TELEPHONE ENCOUNTER
[employer] [facility]: Medicare Hospital Bed Equipment Eligibility Document  Faxed [phone]  [employer]: [phone]

## 2021-12-28 ENCOUNTER — TELEPHONE (OUTPATIENT)
Dept: TRANSPLANT | Facility: CLINIC | Age: 71
End: 2021-12-28
Payer: MEDICARE

## 2021-12-28 ENCOUNTER — MYC MEDICAL ADVICE (OUTPATIENT)
Dept: TRANSPLANT | Facility: CLINIC | Age: 71
End: 2021-12-28
Payer: MEDICARE

## 2021-12-28 DIAGNOSIS — Z76.82 ORGAN TRANSPLANT CANDIDATE: ICD-10-CM

## 2021-12-28 DIAGNOSIS — T82.898A PROBLEM WITH DIALYSIS ACCESS, INITIAL ENCOUNTER (H): Primary | ICD-10-CM

## 2021-12-28 DIAGNOSIS — N18.6 ESRD (END STAGE RENAL DISEASE) (H): ICD-10-CM

## 2021-12-28 NOTE — TELEPHONE ENCOUNTER
Called pts daughter to discuss scheduling transplant surgery visit and frailty testing. They want to hold off on scheduling until after the new year. Pt has had a lot of appts and people visiting recently it gets to be too much to have too many things on one day. [facility] will follow up in a month to see where pt is at. Pt daughter verbalized understanding of information and has no further questions. Encouraged to reach out if questions arise.
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
## 2021-12-31 ENCOUNTER — CARE COORDINATION (OUTPATIENT)
Dept: NEPHROLOGY | Facility: CLINIC | Age: 71
End: 2021-12-31
Payer: MEDICARE

## 2021-12-31 NOTE — PROGRESS NOTES
Received call from pt daughter, Caroline.  Per Caroline, pt right hand up to the wrist is pink.  The warmth is the same as the left hand.  Does not appear significantly swollen at this time, but pt has just woken up.  Caroline was advised to bring to urgent care to assess for gout vs. Infection by PCP.  Denied red streaks on arm, fever or other ssx infection.    Caroline requested ultrasound for next Wednesday d/t other appointments.    Scheduled for ultrasound and digital pressures- on Wednesday 1/5/22.  Informed it is a 2 hour imaging study.    Will send note to Dr. Banks re: pink hand/wrist on right arm and that they are planning to be seen at urgent care for assessment today.    KIRTI GRESHAM RN on 12/31/2021 at 9:52 AM  Dialysis Access Care Coordinator  Phone: [phone]

## 2022-01-02 ENCOUNTER — HEALTH MAINTENANCE LETTER (OUTPATIENT)
Age: 72
End: 2022-01-02

## 2022-01-04 ENCOUNTER — TELEPHONE (OUTPATIENT)
Dept: FAMILY MEDICINE | Facility: CLINIC | Age: 72
End: 2022-01-04
Payer: MEDICARE

## 2022-01-04 NOTE — TELEPHONE ENCOUNTER
Reason for Call:  Form, our goal is to have forms completed with 72 hours, however, some forms may require a visit or additional information.    Type of letter, form or note:  Home Health Certification-HOME HEALTH CERTIFICATION 12/27-02/24/2022    Who is the form from?: Home care    Where did the form come from: form was faxed in    What clinic location was the form placed at?: [facility]    Where the form was placed:  Box/Folder    What number is listed as a contact on the form?:   F: [phone]       Additional comments: Please sign date and fax back     Call taken on 1/4/2022 at 11:24 AM by Kat Corrales

## 2022-01-05 ENCOUNTER — ANCILLARY PROCEDURE (OUTPATIENT)
Dept: ULTRASOUND IMAGING | Facility: CLINIC | Age: 72
End: 2022-01-05
Attending: SURGERY
Payer: MEDICARE

## 2022-01-05 PROCEDURE — 93922 UPR/L XTREMITY ART 2 LEVELS: CPT | Mod: GC | Performed by: RADIOLOGY

## 2022-01-05 PROCEDURE — 93990 DOPPLER FLOW TESTING: CPT | Mod: GC | Performed by: RADIOLOGY

## 2022-01-10 ENCOUNTER — OFFICE VISIT (OUTPATIENT)
Dept: TRANSPLANT | Facility: CLINIC | Age: 72
End: 2022-01-10
Attending: INTERNAL MEDICINE
Payer: MEDICARE

## 2022-01-10 VITALS — DIASTOLIC BLOOD PRESSURE: 80 MMHG | HEART RATE: 65 BPM | SYSTOLIC BLOOD PRESSURE: 171 MMHG | OXYGEN SATURATION: 100 %

## 2022-01-10 DIAGNOSIS — T82.898D STEAL SYNDROME AS COMPLICATION OF DIALYSIS ACCESS, SUBSEQUENT ENCOUNTER: ICD-10-CM

## 2022-01-10 DIAGNOSIS — T82.898A PROBLEM WITH DIALYSIS ACCESS, INITIAL ENCOUNTER (H): Primary | ICD-10-CM

## 2022-01-10 PROCEDURE — 99213 OFFICE O/P EST LOW 20 MIN: CPT | Performed by: SURGERY

## 2022-01-10 ASSESSMENT — PAIN SCALES - GENERAL: PAINLEVEL: MILD PAIN (2)
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
## 2022-01-13 ENCOUNTER — PREP FOR PROCEDURE (OUTPATIENT)
Dept: TRANSPLANT | Facility: CLINIC | Age: 72
End: 2022-01-13
Payer: MEDICARE

## 2022-01-13 DIAGNOSIS — T82.9XXA COMPLICATION OF VASCULAR ACCESS FOR DIALYSIS: Primary | ICD-10-CM

## 2022-01-13 DIAGNOSIS — N18.6 ESRD ON DIALYSIS (H): ICD-10-CM

## 2022-01-13 DIAGNOSIS — Z99.2 ESRD ON DIALYSIS (H): ICD-10-CM

## 2022-01-17 NOTE — PROGRESS NOTES
PT daughter sent [employer] message wondering when the PAC and COVID-19 testing appointments were going to be made for pt upcoming AVF ligation surgery on 2/2/22.  Responded back and gave scheduling numbers for them to call if they would like to schedule themselves instead of waiting for someone to call them.    Pt daughter also wondering if there is anything they should be monitoring or watching for while they wait for surgery.  Informed them there is nothing specific to watch for since the fistula is going to be taken down.  If the bruit or thrill diminishes or disappears at this point, it is ok.    Will continue to follow.    KIRTI GRESHAM RN on 1/17/2022 at 3:29 PM  Dialysis Access Care Coordinator  Phone: [phone]

## 2022-01-19 DIAGNOSIS — Z01.812 PRE-PROCEDURE LAB EXAM: Primary | ICD-10-CM

## 2022-01-19 NOTE — TELEPHONE ENCOUNTER
FUTURE VISIT INFORMATION      SURGERY INFORMATION:    Date: 22    [facility]    Surgeon:  Kennedy Banks MD    Anesthesia Type:  MAC with Block    Procedure: Right Upper extremity arteriovenous Fistula LIGATION    Consult: ov 1/10/22    RECORDS REQUESTED FROM:        Primary Care Provider: Noam Jackson MD- [facility]    Pertinent Medical History: deonte, acute respiratory failure with hypoxia, hypertension, CHF, PVD    Most recent EKG+ Tracin21    Most recent ECHO: 21    Most recent Cardiac Stress Test: 19    Most recent PFT's: 18    Most recent Sleep Study:  21

## 2022-01-21 DIAGNOSIS — Z11.59 ENCOUNTER FOR SCREENING FOR OTHER VIRAL DISEASES: Primary | ICD-10-CM

## 2022-01-25 ENCOUNTER — TELEPHONE (OUTPATIENT)
Dept: FAMILY MEDICINE | Facility: CLINIC | Age: 72
End: 2022-01-25
Payer: MEDICARE

## 2022-01-27 ENCOUNTER — HOSPITAL ENCOUNTER (EMERGENCY)
Facility: CLINIC | Age: 72
Discharge: HOME | End: 2022-01-27
Attending: EMERGENCY MEDICINE | Admitting: EMERGENCY MEDICINE
Payer: MEDICARE

## 2022-01-27 ENCOUNTER — MYC MEDICAL ADVICE (OUTPATIENT)
Dept: TRANSPLANT | Facility: CLINIC | Age: 72
End: 2022-01-27
Payer: MEDICARE

## 2022-01-27 ENCOUNTER — APPOINTMENT (OUTPATIENT)
Dept: ULTRASOUND IMAGING | Facility: CLINIC | Age: 72
End: 2022-01-27
Attending: SURGERY
Payer: MEDICARE

## 2022-01-27 VITALS
DIASTOLIC BLOOD PRESSURE: 68 MMHG | RESPIRATION RATE: 18 BRPM | TEMPERATURE: 98.6 F | HEART RATE: 62 BPM | SYSTOLIC BLOOD PRESSURE: 146 MMHG | OXYGEN SATURATION: 99 %

## 2022-01-27 DIAGNOSIS — M79.89 SWELLING OF RIGHT HAND: ICD-10-CM

## 2022-01-27 PROCEDURE — 93990 DOPPLER FLOW TESTING: CPT

## 2022-01-27 PROCEDURE — 99284 EMERGENCY DEPT VISIT MOD MDM: CPT | Mod: 25 | Performed by: EMERGENCY MEDICINE

## 2022-01-27 PROCEDURE — 99282 EMERGENCY DEPT VISIT SF MDM: CPT | Performed by: EMERGENCY MEDICINE

## 2022-01-27 PROCEDURE — 93990 DOPPLER FLOW TESTING: CPT | Mod: 26 | Performed by: RADIOLOGY

## 2022-01-27 ASSESSMENT — ENCOUNTER SYMPTOMS
GASTROINTESTINAL NEGATIVE: 1
MYALGIAS: 0
CARDIOVASCULAR NEGATIVE: 1
FEVER: 0
COLOR CHANGE: 1
RESPIRATORY NEGATIVE: 1
ARTHRALGIAS: 0

## 2022-01-27 NOTE — ED TRIAGE NOTES
72 year old female currently on hemodialysis presents by EMS ([facility]) with concerns of increased hand pain and swelling.  Patient is scheduled to have surgery on current fistula within the next week, but is concerned about the swelling today.  Patient did not go to her dialysis appointment today due to the swelling and pain.

## 2022-01-27 NOTE — DISCHARGE INSTRUCTIONS
I have been told that you will be able to dialyze tomorrow.  The ultrasound results are printed below    US Ext Arterial Venous Dialys Acs Graft   Final Result   IMPRESSION: Right brachial cephalic dialysis fistula.   1. No arterial inflow stenosis demonstrated.      2. Brachial artery distal to the anastomosis is antegrade in flow,   previously retrograde. The radial artery is retrograde in flow at its   origin but just below there is a collateral and the radial artery is   antegrade in flow. Radial and ulnar arteries are antegrade in flow at   the wrist.      3. Arteriovenous anastomosis measures 5.9 mm in diameter. 605 cm/s   velocity but the velocity ratio is only 1.52 across the anastomosis.      4. Patent cephalic outflow vein with 1318 cc/min flow volume in the   mid arm.      5. No central venous stenosis demonstrated.      LUIZ CEBALLOS MD            SYSTEM ID:  [id number]

## 2022-01-27 NOTE — PROGRESS NOTES
"Spoke with charge RN in ED.  Requested SOT be consulted as pt surgeon is on service (Dr. Banks).  Writer and charge RN unable to find appropriate consult order.  Per Dr. Banks, order is under \"transplant surgery consults panel\" and instructed to chose \"donor/dialysis access\".  Will call ED back with info.     ED RN placed order for ultrasound of right arm AVF ([id number]) for Dr. Banks.     Will continue to follow and assist as able.     KIRTI GRESHAM RN on 1/27/2022 at 1:49 PM  Dialysis Access Care Coordinator  Phone: [phone]    Received [employer] message from pt daughter on behalf of pt.  Message includes image of hand swelling.  Message also states the hand is hurting and very warm.  Pt is scheduled for surgery on AVF on 2/2/22 with Dr. Banks.     Message was forwarded to Dr. Banks who reviewed and saw pt is currently in ED.     Dr. Banks is currently on service and is happy to evaluate pt in ED.  Per Dr. Banks, will request ED put in consult order for SOT to evaluate and order ultrasound of right arm AVF.     Will call ED to discuss/hand off.     Sent [employer] message with update to pt daughter, since she was unable to accompany patient.     KIRTI GRESHAM RN on 1/27/2022 at 12:54 PM  Dialysis Access Care Coordinator  Phone: [phone]     "

## 2022-01-27 NOTE — TELEPHONE ENCOUNTER
"Spoke with charge RN in ED.  Requested SOT be consulted as pt surgeon is on service (Dr. Banks).  Writer and charge RN unable to find appropriate consult order.  Per Dr. Banks, order is under \"transplant surgery consults panel\" and instructed to chose \"donor/dialysis access\".  Will call ED back with info.    ED RN placed order for ultrasound of right arm AVF ([id number]) for Dr. Banks.    Will continue to follow and assist as able.    KIRTI GRESHAM RN on 1/27/2022 at 1:49 PM  Dialysis Access Care Coordinator  Phone: [phone]    "
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
## 2022-01-27 NOTE — TELEPHONE ENCOUNTER
Received [employer] message from pt daughter on behalf of pt.  Message includes image of hand swelling.  Message also states the hand is hurting and very warm.  Pt is scheduled for surgery on AVF on 2/2/22 with Dr. Banks.    Message was forwarded to Dr. Banks who reviewed and saw pt is currently in ED.    Dr. Banks is currently on service and is happy to evaluate pt in ED.  Per Dr. Banks, will request ED put in consult order for SOT to evaluate and order ultrasound of right arm AVF.    Will call ED to discuss/hand off.    Sent [employer] message with update to pt daughter, since she was unable to accompany patient.    KIRTI GRESHAM RN on 1/27/2022 at 12:54 PM  Dialysis Access Care Coordinator  Phone: [phone]

## 2022-01-27 NOTE — PROGRESS NOTES
Notified by Dr. Banks that pt needs HD arranged to accommodate missing HD this morning to report to the ED for hand pain/ swelling r/t RUE AVF.    PT is scheduled for AVF ligation with Dr. Banks on 2/2/22 and has scheduled pre-op H&P at 1500 tomorrow, 1/28/22.  Pt is worried about getting dialysis and being able to get to pre-op appointment in time, as she doesn't want anything to delay or postpone schedule procedure on 2/2/22.    Called [facility], where pt dialyzes.  No answer.  Called [facility], they are unable to accommodate and informed writer to try [facility], [facility].  Called [facility] and was informed the FA was gone for the day and nurses are unable to look up patients from other units.  Writer told to call [facility] back and that FA was still there and would answer call.  Writer called [facility] and no one answered.  Writer [facility] requesting urgent call back.    Called pt nephrology NP, Karen Lynn.  Per Karen, pt is ok to discharge ED and dialyze outpatient in the morning.  Karen stated [facility] will find pt a place to dialyze and if pt needs to have a shortened treatment to make it to her pre-op appointment at 1500, that is fine.  Pt will for sure have full dialysis tx on Saturday as regularly scheduled.    Will call ED to report plan.    KIRTI GRESHAM RN on 1/27/2022 at 5:10 PM  Dialysis Access Care Coordinator  Phone: [phone]      Handed off plan to ED Caitlyn WOMACK.    Called pt daughter to relay plan.  Daughter verbalized understanding.  Will call dialysis clinic in the morning to arrange chair time and call daughter once arranged.    KIRTI GRESHAM RN on 1/27/2022 at 5:25 PM  Dialysis Access Care Coordinator  Phone: [phone]

## 2022-01-28 ENCOUNTER — CARE COORDINATION (OUTPATIENT)
Dept: NEPHROLOGY | Facility: CLINIC | Age: 72
End: 2022-01-28

## 2022-01-28 ENCOUNTER — PRE VISIT (OUTPATIENT)
Dept: SURGERY | Facility: CLINIC | Age: 72
End: 2022-01-28

## 2022-01-28 ENCOUNTER — ANESTHESIA EVENT (OUTPATIENT)
Dept: SURGERY | Facility: CLINIC | Age: 72
End: 2022-01-28
Payer: MEDICARE

## 2022-01-28 ENCOUNTER — OFFICE VISIT (OUTPATIENT)
Dept: SURGERY | Facility: CLINIC | Age: 72
End: 2022-01-28
Payer: MEDICARE

## 2022-01-28 ENCOUNTER — MEDICAL CORRESPONDENCE (OUTPATIENT)
Dept: HEALTH INFORMATION MANAGEMENT | Facility: CLINIC | Age: 72
End: 2022-01-28

## 2022-01-28 VITALS
OXYGEN SATURATION: 99 % | DIASTOLIC BLOOD PRESSURE: 85 MMHG | TEMPERATURE: 97.9 F | HEIGHT: 65 IN | BODY MASS INDEX: 35.27 KG/M2 | WEIGHT: 211.7 LBS | SYSTOLIC BLOOD PRESSURE: 163 MMHG | HEART RATE: 63 BPM | RESPIRATION RATE: 16 BRPM

## 2022-01-28 DIAGNOSIS — Z01.818 PREOP EXAMINATION: Primary | ICD-10-CM

## 2022-01-28 PROCEDURE — 99215 OFFICE O/P EST HI 40 MIN: CPT | Performed by: PHYSICIAN ASSISTANT

## 2022-01-28 ASSESSMENT — ENCOUNTER SYMPTOMS: SEIZURES: 0

## 2022-01-28 ASSESSMENT — MIFFLIN-ST. JEOR: SCORE: 1466.14

## 2022-01-28 ASSESSMENT — PAIN SCALES - GENERAL: PAINLEVEL: NO PAIN (0)

## 2022-01-28 ASSESSMENT — LIFESTYLE VARIABLES: TOBACCO_USE: 1
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
## 2022-01-28 NOTE — PATIENT INSTRUCTIONS
Preparing for Your Surgery      Name:  Supriya Herr   MRN:  [id number]   :  1950   Today's Date:  2022       Arriving for surgery:  Surgery date:  2022  Arrival time:  8:00 am    Restrictions due to COVID 19       Effective 22 [facility] is implementing the following visitor policy:     1 person may accompany the patient through the Pre-Op process.      Then that person will be asked to leave the building.        There will be no visitors in the Surgery Waiting Room.        Inpatients are allowed 1 consistent visitor for the duration of their stay.      Visitors must wear a mask.      Visitors must not be ill.      Visiting hours are 8 am to 8 pm.    [place] parking is available for anyone with mobility limitations or disabilities.  ([place]  24 hours/ 7 days a week; [facility]  7 am- 3:30 pm, Mon- Fri)    Please come to:     [facility] [address]  [address] [place] [address]       -    Please proceed to [address] on the 3rd floor. [phone]?     - ?If you are in need of directions, wheelchair or escort please stop at the Information Desk in the lobby.       What can I eat or drink?  -  You may eat and drink normally for up to 8 hours before your surgery. (Until Midnight)  -  You may have clear liquids until 2 hours before surgery. (Until 8 am arrival time)    Examples of clear liquids:  Water  Clear broth  Juices (apple, white grape, white cranberry  and cider) without pulp  Noncarbonated, powder based beverages  (lemonade and Pastor-Aid)  Sodas (Sprite, 7-Up, ginger ale and seltzer)  Coffee or tea (without milk or cream)  Gatorade    -  No Alcohol for at least 24 hours before surgery     Which medicines can I take?    Hold Aspirin for 7 days before surgery.   Hold Multivitamins for 7 days before surgery.  Hold Supplements for 7 days before surgery.  Hold Ibuprofen (Advil, Motrin) for 1 day before surgery.  Hold  Naproxen (Aleve) for 4 days before surgery.    -  DO NOT take these medications the day of surgery:  Sevelamer carbonate (renvele)      Take 80% of usual pm insulin dose the evening before surgery---(14 units)      -  PLEASE TAKE these medications the day of surgery:  All other usual am prescription medications      How do I prepare myself?  - Please take 2 showers before surgery using Scrubcare or Hibiclens soap.    Use this soap only from the neck to your toes.     Leave the soap on your skin for one minute--then rinse thoroughly.      You may use your own shampoo and conditioner; no other hair products.   - Please remove all jewelry and body piercings.  - No lotions, deodorants or fragrance.  - No makeup or fingernail polish.   - Bring your ID and insurance card.    -If you have a Deep Brain Stimulator, Spinal Cord Stimulator or any neuro stimulator device---you must bring the remote control to the hospital     - All patients are required to have a Covid-19 test within 4 days of surgery/procedure.      -Patients will be contacted by the [facility] scheduling team within 1 week of surgery to make an appointment.      - Patients may call the Scheduling team at [phone] if they have not been scheduled within 4 days of  surgery.      ALL PATIENTS GOING HOME THE SAME DAY OF SURGERY ARE REQUIRED TO HAVE A RESPONSIBLE ADULT TO DRIVE AND BE IN ATTENDANCE WITH THEM FOR 24 HOURS FOLLOWING SURGERY.      Questions or Concerns:    - For any questions regarding the day of surgery or your hospital stay, please contact the Pre Admission Nursing Office at [phone].       - If you have health changes between today and your surgery please call your surgeon.       For questions after surgery please call your surgeons office.

## 2022-01-28 NOTE — PROGRESS NOTES
Called [facility] first thing this morning.  FA wasn't in yet but spoke with Charge RN.  [facility] did not have any chairs available for HD this morning, but Charge RN was going to start calling to find a place for pt to dialyze today.  Informed charge RN that [facility] stated it was ok for pt to have partial dialysis today if necessary to make it to pre-op appointment at 1500 this afternoon.  Charge RN verbalized understanding and will call writer back with plan.    Writer has not heard back yet.  Attempted to call pt and pt daughter to see if they have heard anything.  LVM.    Will call [facility] to get update.    KIRTI GRESHAM RN on 1/28/2022 at 10:40 AM  Dialysis Access Care Coordinator  Phone: [phone]

## 2022-01-28 NOTE — PROGRESS NOTES
Called pt and spoke with pt daughter.  Per Caroline, pt daughter, pt started dialysis at 0930 in [place].  Pt will be able to have full HD tx followed by scheduled pre-op appointment at 1500 today.  Pt has COVID-19 test tomorrow.    Will continue to follow.    KIRTI GRESHAM RN on 1/28/2022 at 10:48 AM  Dialysis Access Care Coordinator  Phone: [phone]

## 2022-01-29 ENCOUNTER — LAB (OUTPATIENT)
Dept: LAB | Facility: CLINIC | Age: 72
End: 2022-01-29
Attending: SURGERY

## 2022-01-29 DIAGNOSIS — Z11.59 ENCOUNTER FOR SCREENING FOR OTHER VIRAL DISEASES: ICD-10-CM

## 2022-01-29 PROCEDURE — U0003 INFECTIOUS AGENT DETECTION BY NUCLEIC ACID (DNA OR RNA); SEVERE ACUTE RESPIRATORY SYNDROME CORONAVIRUS 2 (SARS-COV-2) (CORONAVIRUS DISEASE [COVID-19]), AMPLIFIED PROBE TECHNIQUE, MAKING USE OF HIGH THROUGHPUT TECHNOLOGIES AS DESCRIBED BY CMS-2020-01-R: HCPCS | Performed by: SURGERY

## 2022-01-30 LAB — SARS-COV-2 RNA RESP QL NAA+PROBE: NEGATIVE

## 2022-02-01 ENCOUNTER — MEDICAL CORRESPONDENCE (OUTPATIENT)
Dept: HEALTH INFORMATION MANAGEMENT | Facility: CLINIC | Age: 72
End: 2022-02-01
Payer: MEDICARE

## 2022-02-02 ENCOUNTER — HOSPITAL ENCOUNTER (OUTPATIENT)
Facility: CLINIC | Age: 72
Discharge: HOME | End: 2022-02-02
Attending: SURGERY | Admitting: SURGERY
Payer: MEDICARE

## 2022-02-02 ENCOUNTER — ANESTHESIA (OUTPATIENT)
Dept: SURGERY | Facility: CLINIC | Age: 72
End: 2022-02-02
Payer: MEDICARE

## 2022-02-02 VITALS
TEMPERATURE: 97.6 F | OXYGEN SATURATION: 98 % | SYSTOLIC BLOOD PRESSURE: 145 MMHG | HEART RATE: 63 BPM | DIASTOLIC BLOOD PRESSURE: 71 MMHG | RESPIRATION RATE: 16 BRPM

## 2022-02-02 DIAGNOSIS — Z99.2 ENCOUNTER REGARDING VASCULAR ACCESS FOR DIALYSIS FOR END-STAGE RENAL DISEASE (H): Primary | ICD-10-CM

## 2022-02-02 DIAGNOSIS — N18.6 ENCOUNTER REGARDING VASCULAR ACCESS FOR DIALYSIS FOR END-STAGE RENAL DISEASE (H): Primary | ICD-10-CM

## 2022-02-02 DIAGNOSIS — T82.898D STEAL SYNDROME AS COMPLICATION OF DIALYSIS ACCESS, SUBSEQUENT ENCOUNTER: ICD-10-CM

## 2022-02-02 LAB
GLUCOSE BLDC GLUCOMTR-MCNC: 173 MG/DL (ref 70–99)
POTASSIUM BLD-SCNC: 4.7 MMOL/L (ref 3.4–5.3)

## 2022-02-02 PROCEDURE — 250N000009 HC RX 250

## 2022-02-02 PROCEDURE — 250N000011 HC RX IP 250 OP 636: Performed by: SURGERY

## 2022-02-02 PROCEDURE — 36415 COLL VENOUS BLD VENIPUNCTURE: CPT | Performed by: ANESTHESIOLOGY

## 2022-02-02 PROCEDURE — 37607 LIG/BANDING ANGIOACS AV FSTL: CPT | Mod: RT | Performed by: SURGERY

## 2022-02-02 PROCEDURE — 250N000011 HC RX IP 250 OP 636

## 2022-02-02 PROCEDURE — 258N000003 HC RX IP 258 OP 636: Performed by: SURGERY

## 2022-02-02 PROCEDURE — 999N000141 HC STATISTIC PRE-PROCEDURE NURSING ASSESSMENT: Performed by: SURGERY

## 2022-02-02 PROCEDURE — 370N000017 HC ANESTHESIA TECHNICAL FEE, PER MIN: Performed by: SURGERY

## 2022-02-02 PROCEDURE — 84132 ASSAY OF SERUM POTASSIUM: CPT | Performed by: ANESTHESIOLOGY

## 2022-02-02 PROCEDURE — 250N000011 HC RX IP 250 OP 636: Performed by: STUDENT IN AN ORGANIZED HEALTH CARE EDUCATION/TRAINING PROGRAM

## 2022-02-02 PROCEDURE — 250N000009 HC RX 250: Performed by: SURGERY

## 2022-02-02 PROCEDURE — 82962 GLUCOSE BLOOD TEST: CPT

## 2022-02-02 PROCEDURE — 710N000012 HC RECOVERY PHASE 2, PER MINUTE: Performed by: SURGERY

## 2022-02-02 PROCEDURE — 360N000076 HC SURGERY LEVEL 3, PER MIN: Performed by: SURGERY

## 2022-02-02 PROCEDURE — 258N000003 HC RX IP 258 OP 636

## 2022-02-02 PROCEDURE — 272N000001 HC OR GENERAL SUPPLY STERILE: Performed by: SURGERY

## 2022-02-02 RX ORDER — HALOPERIDOL 5 MG/ML
1 INJECTION INTRAMUSCULAR
Status: DISCONTINUED | OUTPATIENT
Start: 2022-02-02 | End: 2022-02-07 | Stop reason: HOSPADM

## 2022-02-02 RX ORDER — LIDOCAINE 40 MG/G
CREAM TOPICAL
Status: DISCONTINUED | OUTPATIENT
Start: 2022-02-02 | End: 2022-02-07 | Stop reason: HOSPADM

## 2022-02-02 RX ORDER — NALOXONE HYDROCHLORIDE 0.4 MG/ML
0.2 INJECTION, SOLUTION INTRAMUSCULAR; INTRAVENOUS; SUBCUTANEOUS
Status: DISCONTINUED | OUTPATIENT
Start: 2022-02-02 | End: 2022-02-07 | Stop reason: HOSPADM

## 2022-02-02 RX ORDER — HYDROMORPHONE HYDROCHLORIDE 1 MG/ML
0.2 INJECTION, SOLUTION INTRAMUSCULAR; INTRAVENOUS; SUBCUTANEOUS EVERY 5 MIN PRN
Status: DISCONTINUED | OUTPATIENT
Start: 2022-02-02 | End: 2022-02-07 | Stop reason: HOSPADM

## 2022-02-02 RX ORDER — PROPOFOL 10 MG/ML
INJECTION, EMULSION INTRAVENOUS CONTINUOUS PRN
Status: DISCONTINUED | OUTPATIENT
Start: 2022-02-02 | End: 2022-02-02

## 2022-02-02 RX ORDER — FENTANYL CITRATE 50 UG/ML
INJECTION, SOLUTION INTRAMUSCULAR; INTRAVENOUS PRN
Status: DISCONTINUED | OUTPATIENT
Start: 2022-02-02 | End: 2022-02-02

## 2022-02-02 RX ORDER — SODIUM CHLORIDE, SODIUM LACTATE, POTASSIUM CHLORIDE, CALCIUM CHLORIDE 600; 310; 30; 20 MG/100ML; MG/100ML; MG/100ML; MG/100ML
INJECTION, SOLUTION INTRAVENOUS CONTINUOUS PRN
Status: DISCONTINUED | OUTPATIENT
Start: 2022-02-02 | End: 2022-02-02

## 2022-02-02 RX ORDER — ACETAMINOPHEN 325 MG/1
650 TABLET ORAL
Status: DISCONTINUED | OUTPATIENT
Start: 2022-02-02 | End: 2022-02-07 | Stop reason: HOSPADM

## 2022-02-02 RX ORDER — ONDANSETRON 2 MG/ML
INJECTION INTRAMUSCULAR; INTRAVENOUS PRN
Status: DISCONTINUED | OUTPATIENT
Start: 2022-02-02 | End: 2022-02-02

## 2022-02-02 RX ORDER — CLINDAMYCIN PHOSPHATE 900 MG/50ML
900 INJECTION, SOLUTION INTRAVENOUS
Status: COMPLETED | OUTPATIENT
Start: 2022-02-02 | End: 2022-02-02

## 2022-02-02 RX ORDER — FLUMAZENIL 0.1 MG/ML
0.2 INJECTION, SOLUTION INTRAVENOUS
Status: DISCONTINUED | OUTPATIENT
Start: 2022-02-02 | End: 2022-02-07 | Stop reason: HOSPADM

## 2022-02-02 RX ORDER — FENTANYL CITRATE 50 UG/ML
25 INJECTION, SOLUTION INTRAMUSCULAR; INTRAVENOUS EVERY 5 MIN PRN
Status: DISCONTINUED | OUTPATIENT
Start: 2022-02-02 | End: 2022-02-07 | Stop reason: HOSPADM

## 2022-02-02 RX ORDER — OXYCODONE HYDROCHLORIDE 5 MG/1
5 TABLET ORAL EVERY 6 HOURS PRN
Qty: 12 TABLET | Refills: 0 | Status: SHIPPED | OUTPATIENT
Start: 2022-02-02 | End: 2022-02-05

## 2022-02-02 RX ORDER — EPHEDRINE SULFATE 50 MG/ML
INJECTION, SOLUTION INTRAVENOUS PRN
Status: DISCONTINUED | OUTPATIENT
Start: 2022-02-02 | End: 2022-02-02

## 2022-02-02 RX ORDER — CLINDAMYCIN PHOSPHATE 900 MG/50ML
900 INJECTION, SOLUTION INTRAVENOUS SEE ADMIN INSTRUCTIONS
Status: DISCONTINUED | OUTPATIENT
Start: 2022-02-02 | End: 2022-02-07 | Stop reason: HOSPADM

## 2022-02-02 RX ORDER — SODIUM CHLORIDE, SODIUM LACTATE, POTASSIUM CHLORIDE, CALCIUM CHLORIDE 600; 310; 30; 20 MG/100ML; MG/100ML; MG/100ML; MG/100ML
INJECTION, SOLUTION INTRAVENOUS CONTINUOUS
Status: DISCONTINUED | OUTPATIENT
Start: 2022-02-02 | End: 2022-02-07 | Stop reason: HOSPADM

## 2022-02-02 RX ORDER — FENTANYL CITRATE 50 UG/ML
25-50 INJECTION, SOLUTION INTRAMUSCULAR; INTRAVENOUS
Status: DISCONTINUED | OUTPATIENT
Start: 2022-02-02 | End: 2022-02-07 | Stop reason: HOSPADM

## 2022-02-02 RX ORDER — OXYCODONE HYDROCHLORIDE 5 MG/1
5 TABLET ORAL
Status: DISCONTINUED | OUTPATIENT
Start: 2022-02-02 | End: 2022-02-07 | Stop reason: HOSPADM

## 2022-02-02 RX ORDER — NALOXONE HYDROCHLORIDE 0.4 MG/ML
0.4 INJECTION, SOLUTION INTRAMUSCULAR; INTRAVENOUS; SUBCUTANEOUS
Status: DISCONTINUED | OUTPATIENT
Start: 2022-02-02 | End: 2022-02-07 | Stop reason: HOSPADM

## 2022-02-02 RX ORDER — METOPROLOL TARTRATE 1 MG/ML
1-2 INJECTION, SOLUTION INTRAVENOUS EVERY 5 MIN PRN
Status: DISCONTINUED | OUTPATIENT
Start: 2022-02-02 | End: 2022-02-07 | Stop reason: HOSPADM

## 2022-02-02 RX ADMIN — EPHEDRINE SULFATE 10 MG: 50 INJECTION, SOLUTION INTRAVENOUS at 11:23

## 2022-02-02 RX ADMIN — PHENYLEPHRINE HYDROCHLORIDE 100 MCG: 10 INJECTION INTRAVENOUS at 10:58

## 2022-02-02 RX ADMIN — EPHEDRINE SULFATE 5 MG: 50 INJECTION, SOLUTION INTRAVENOUS at 11:10

## 2022-02-02 RX ADMIN — EPHEDRINE SULFATE 10 MG: 50 INJECTION, SOLUTION INTRAVENOUS at 10:53

## 2022-02-02 RX ADMIN — EPHEDRINE SULFATE 5 MG: 50 INJECTION, SOLUTION INTRAVENOUS at 11:04

## 2022-02-02 RX ADMIN — SODIUM CHLORIDE, POTASSIUM CHLORIDE, SODIUM LACTATE AND CALCIUM CHLORIDE: 600; 310; 30; 20 INJECTION, SOLUTION INTRAVENOUS at 10:27

## 2022-02-02 RX ADMIN — PROPOFOL 100 MCG/KG/MIN: 10 INJECTION, EMULSION INTRAVENOUS at 10:35

## 2022-02-02 RX ADMIN — PHENYLEPHRINE HYDROCHLORIDE 100 MCG: 10 INJECTION INTRAVENOUS at 10:52

## 2022-02-02 RX ADMIN — ONDANSETRON 4 MG: 2 INJECTION INTRAMUSCULAR; INTRAVENOUS at 10:35

## 2022-02-02 RX ADMIN — FENTANYL CITRATE 25 MCG: 50 INJECTION, SOLUTION INTRAMUSCULAR; INTRAVENOUS at 10:49

## 2022-02-02 RX ADMIN — FENTANYL CITRATE 25 MCG: 50 INJECTION, SOLUTION INTRAMUSCULAR; INTRAVENOUS at 11:17

## 2022-02-02 RX ADMIN — PHENYLEPHRINE HYDROCHLORIDE 100 MCG: 10 INJECTION INTRAVENOUS at 11:10

## 2022-02-02 RX ADMIN — CLINDAMYCIN PHOSPHATE 900 MG: 900 INJECTION, SOLUTION INTRAVENOUS at 10:35

## 2022-02-02 RX ADMIN — EPHEDRINE SULFATE 10 MG: 50 INJECTION, SOLUTION INTRAVENOUS at 10:56

## 2022-02-02 RX ADMIN — EPHEDRINE SULFATE 5 MG: 50 INJECTION, SOLUTION INTRAVENOUS at 10:58

## 2022-02-02 RX ADMIN — PHENYLEPHRINE HYDROCHLORIDE 200 MCG: 10 INJECTION INTRAVENOUS at 10:55

## 2022-02-02 RX ADMIN — PHENYLEPHRINE HYDROCHLORIDE 100 MCG: 10 INJECTION INTRAVENOUS at 11:23

## 2022-02-02 RX ADMIN — PHENYLEPHRINE HYDROCHLORIDE 100 MCG: 10 INJECTION INTRAVENOUS at 11:04

## 2022-02-02 RX ADMIN — PHENYLEPHRINE HYDROCHLORIDE 100 MCG: 10 INJECTION INTRAVENOUS at 10:40

## 2022-02-02 NOTE — DISCHARGE INSTRUCTIONS
You have undergone ligation of your RIGHT upper arm AV fistula. Please observe the following discharge instructions:    -Your wound is closed with dissolving stitches and skin glue. The glue will flake off with time.  -You may shower tomorrow.  -We will place a compression wrap on your arm after surgery. You can remove this to shower and then reapply it as needed to help reduce swelling. Please elevate your arm to help reduce swelling as well. If you are not having swelling or discomfort, you do not need to wrap the arm.  -Please call for a follow-up appointment in three to four weeks to assess your wound healing.  -Please call sooner for redness, swelling, warmth, drainage, or significant pain in your wound, or changes in your hand (coldness, weakness, numbness). You should be able to feel a pulse at your right wrist (marked for you). If you have these symptoms and do not feel that pulse, please call immediately.   -You will be given a short course of narcotic pain medication for recovery. Please also use Tylenol as needed for pain.  -You do NOT need to follow fistula precautions with your right arm any longer. It is OK to use for blood pressure cuffs and IV draws.    [facility], [place]  Same-Day Surgery   Adult Discharge Orders & Instructions     For 24 hours after surgery    1. Get plenty of rest.  A responsible adult must stay with you for at least 24 hours after you leave the hospital.   2. Do not drive or use heavy equipment.  If you have weakness or tingling, don't drive or use heavy equipment until this feeling goes away.  3. Do not drink alcohol.  4. Avoid strenuous or risky activities.  Ask for help when climbing stairs.   5. You may feel lightheaded.  IF so, sit for a few minutes before standing.  Have someone help you get up.   6. If you have nausea (feel sick to your stomach): Drink only clear liquids such as apple juice, ginger ale, broth or 7-Up.  Rest may also help.  Be  sure to drink enough fluids.  Move to a regular diet as you feel able.  7. You may have a slight fever. Call the doctor if your fever is over 100 F (37.7 C) (taken under the tongue) or lasts longer than 24 hours.  8. You may have a dry mouth, a sore throat, muscle aches or trouble sleeping.  These should go away after 24 hours.  9. Do not make important or legal decisions.   Call your doctor for any of the followin.  Signs of infection (fever, growing tenderness at the surgery site, a large amount of drainage or bleeding, severe pain, foul-smelling drainage, redness, swelling).    2. It has been over 8 to 10 hours since surgery and you are still not able to urinate (pass water).    3.  Headache for over 24 hours.    4.  Numbness, tingling or weakness the day after surgery (if you had spinal anesthesia).  To contact a doctor, call Dr. Banks's office at [phone] or:        [phone] and ask for the resident on call for general surgery  (answered 24 hours a day)      Emergency Department:    [facility]: [phone]       (TTY for hearing impaired: [phone])

## 2022-02-02 NOTE — OP NOTE
Transplant Surgery  Operative Note    Preop Dx:  Recurrent steal syndrome- right brachiocephalic fistula s/p banding  Postop Dx: same  Procedure: fistula ligation  Surgeon: Kennedy Banks MD  ASSISTANT:  Deisy Crisostomo NP. There was no qualified general surgery resident available to assist during this procedure.   Anesthesia: Local with MAC  EBL: 20 ml  Fluids: crystalloid  UO: n/a  Drains: none  Specimen: none.  Complications: None  Findings:  Minimal radial pulse at case start. Well matured AV fistula, dissected short distance from arterial anastomosis. At clamping, radial pulse dramatically improved. Fistula divided between clamps and oversewn with 5-0 prolene. Radial pulse remained excellent.   Complications: None.    Indication: The patient has recurrent steal syndrome in her right upper extremity AV fistula. A previous attempt at banding was made that did provide some relief, but given recurrence and the presence of a still functional AV graft in her left upper extremity, we have elected to proceed with ligation of this fistula.  After discussing the risks and benefits of surgery and potential complications, the patient provided informed consent.     DETAILS OF PROCEDURE:  The patient was brought to the operating room, placed in a supine position.  Perioperative prophylactic IV antibiotics were given.  Anesthesia was adminisitered. The right arm was prepped and draped in the usual sterile fashion.  Time out was performed.    We used local anesthetic to numb the skin and deeper tissues. Her prior incision was opened sharply. Hemostasis was achieved using cautery. Using blunt dissection and cautery, the fistula was exposed. This was dissected close to the arterial anastomosis. Clamps were applied and the fistula divided. Both ends were oversewn using 5-0 prolene. We verified a significantly improved radial pulse at case completion. Hemostasis was verified. The wound was irrigated and closed with 3-0  vicryl deeper dermal layers, 4-0 monocryl subcuticular, and skin glue.     All needle, sponge, and instrument counts were accurate.  The patient tolerated the procedure well without apparent complications and was trasfered to the PACU in good condition.  Faculty was present for critical portions of the procedure.

## 2022-02-02 NOTE — ANESTHESIA CARE TRANSFER NOTE
Patient: Supriya Herr    Procedure: Procedure(s):  Right Upper extremity arteriovenous Fistula Ligation       Diagnosis: Complication of vascular access for dialysis [T82.9XXA]  ESRD on dialysis (H) [N18.6, Z99.2]  Diagnosis Additional Information: No value filed.    Anesthesia Type:   MAC     Note:    Oropharynx: oropharynx clear of all foreign objects and spontaneously breathing  Level of Consciousness: awake  Oxygen Supplementation: room air    Independent Airway: airway patency satisfactory and stable  Dentition: dentition unchanged  Vital Signs Stable: post-procedure vital signs reviewed and stable  Report to RN Given: handoff report given  Patient transferred to: Phase II    Handoff Report: Identifed the Patient, Identified the Reponsible Provider, Reviewed the pertinent medical history, Discussed the surgical course, Reviewed Intra-OP anesthesia mangement and issues during anesthesia, Set expectations for post-procedure period and Allowed opportunity for questions and acknowledgement of understanding      Vitals:  Vitals Value Taken Time   BP     Temp     Pulse     Resp     SpO2         Electronically Signed By: PENELOPE Peck CRNA  February 2, 2022  11:42 AM

## 2022-02-02 NOTE — ANESTHESIA POSTPROCEDURE EVALUATION
Patient: Supriya Herr    Procedure: Procedure(s):  Right Upper extremity arteriovenous Fistula Ligation       Diagnosis:Complication of vascular access for dialysis [T82.9XXA]  ESRD on dialysis (H) [N18.6, Z99.2]  Diagnosis Additional Information: No value filed.    Anesthesia Type:  MAC    Note:  Disposition: Outpatient   Postop Pain Control: Uneventful            Sign Out: Well controlled pain   PONV: No   Neuro/Psych: Uneventful            Sign Out: Acceptable/Baseline neuro status   Airway/Respiratory: Uneventful            Sign Out: Acceptable/Baseline resp. status   CV/Hemodynamics: Uneventful            Sign Out: Acceptable CV status; No obvious hypovolemia; No obvious fluid overload   Other NRE: NONE   DID A NON-ROUTINE EVENT OCCUR? No           Last vitals:  Vitals Value Taken Time   /65 02/02/22 1213   Temp 36.4  C (97.6  F) 02/02/22 1202   Pulse 63 02/02/22 1213   Resp 16 02/02/22 1202   SpO2 93 % 02/02/22 1215   Vitals shown include unvalidated device data.    Electronically Signed By: NICA LUCAS MD  February 2, 2022  12:19 PM

## 2022-02-03 ENCOUNTER — MEDICAL CORRESPONDENCE (OUTPATIENT)
Dept: HEALTH INFORMATION MANAGEMENT | Facility: CLINIC | Age: 72
End: 2022-02-03
Payer: MEDICARE

## 2022-02-04 ENCOUNTER — TELEPHONE (OUTPATIENT)
Dept: TRANSPLANT | Facility: CLINIC | Age: 72
End: 2022-02-04
Payer: MEDICARE

## 2022-02-04 NOTE — TELEPHONE ENCOUNTER
February 4, 2022 2:24 PM - Rafy Kramer CNA: pt called to [facility] surgery follow up appt warren Contreras 2/28

## 2022-02-07 ENCOUNTER — THERAPY VISIT (OUTPATIENT)
Dept: SLEEP MEDICINE | Facility: CLINIC | Age: 72
End: 2022-02-07
Payer: MEDICARE

## 2022-02-07 ENCOUNTER — MEDICAL CORRESPONDENCE (OUTPATIENT)
Dept: HEALTH INFORMATION MANAGEMENT | Facility: CLINIC | Age: 72
End: 2022-02-07

## 2022-02-07 DIAGNOSIS — N18.6 ESRD ON DIALYSIS (H): ICD-10-CM

## 2022-02-07 DIAGNOSIS — Z99.2 ESRD ON DIALYSIS (H): ICD-10-CM

## 2022-02-07 DIAGNOSIS — R06.83 SNORING: ICD-10-CM

## 2022-02-07 DIAGNOSIS — G47.10 HYPERSOMNIA: ICD-10-CM

## 2022-02-07 DIAGNOSIS — Z86.69 HISTORY OF OBSTRUCTIVE SLEEP APNEA: ICD-10-CM

## 2022-02-07 PROCEDURE — 95810 POLYSOM 6/> YRS 4/> PARAM: CPT | Mod: 26 | Performed by: INTERNAL MEDICINE

## 2022-02-08 ENCOUNTER — MEDICAL CORRESPONDENCE (OUTPATIENT)
Dept: HEALTH INFORMATION MANAGEMENT | Facility: CLINIC | Age: 72
End: 2022-02-08
Payer: MEDICARE

## 2022-02-08 NOTE — PATIENT INSTRUCTIONS
[facility]    1. Your sleep study will be reviewed by a sleep physician within the next few days.     2. Please follow up in the sleep clinic as scheduled, or, make an appointment with your sleep provider to be seen within two weeks to discuss the results of the sleep study.    3. If you have any questions or problems with your treatment plan, please contact your sleep clinic provider at [phone] to further manage your condition.    4. Please review your attached medication list, and, at your follow-up appointment advise your sleep clinic provider about any changes.    5. Go to [email or web address] for more information about your sleep problems.    Aida Guthrie,   February 8, 2022

## 2022-02-10 LAB — SLPCOMP: NORMAL

## 2022-02-11 ENCOUNTER — MEDICAL CORRESPONDENCE (OUTPATIENT)
Dept: HEALTH INFORMATION MANAGEMENT | Facility: CLINIC | Age: 72
End: 2022-02-11
Payer: MEDICARE

## 2022-02-17 ENCOUNTER — TELEPHONE (OUTPATIENT)
Dept: DERMATOLOGY | Facility: CLINIC | Age: 72
End: 2022-02-17

## 2022-02-17 ENCOUNTER — MEDICAL CORRESPONDENCE (OUTPATIENT)
Dept: HEALTH INFORMATION MANAGEMENT | Facility: CLINIC | Age: 72
End: 2022-02-17
Payer: MEDICARE

## 2022-02-17 NOTE — TELEPHONE ENCOUNTER
Prior Authorization Approval    Authorization Effective Date: 1/1/2022  Authorization Expiration Date: 2/17/2023  Medication: Otezla 30mg  Approved Dose/Quantity: 60 TABS, 60 DAYS  Reference #: [id number]   Insurance Company: [employer] Part D - Phone [phone] Fax [phone]  Expected CoPay: $0     CoPay Card Available: No    Foundation Assistance Needed:    Which Pharmacy is filling the prescription (Not needed for infusion/clinic administered): [facility] - [place], [place] - [address]  Pharmacy Notified: Yes  Patient Notified: Yes

## 2022-02-22 ENCOUNTER — MEDICAL CORRESPONDENCE (OUTPATIENT)
Dept: HEALTH INFORMATION MANAGEMENT | Facility: CLINIC | Age: 72
End: 2022-02-22
Payer: MEDICARE

## 2022-02-23 ENCOUNTER — VIRTUAL VISIT (OUTPATIENT)
Dept: SLEEP MEDICINE | Facility: CLINIC | Age: 72
End: 2022-02-23
Payer: MEDICARE

## 2022-02-23 ENCOUNTER — TELEPHONE (OUTPATIENT)
Dept: SLEEP MEDICINE | Facility: CLINIC | Age: 72
End: 2022-02-23

## 2022-02-23 VITALS — BODY MASS INDEX: 32.8 KG/M2 | HEIGHT: 67 IN | WEIGHT: 209 LBS

## 2022-02-23 DIAGNOSIS — G47.33 OBSTRUCTIVE SLEEP APNEA: Primary | ICD-10-CM

## 2022-02-23 PROCEDURE — 99213 OFFICE O/P EST LOW 20 MIN: CPT | Mod: 95 | Performed by: INTERNAL MEDICINE

## 2022-02-23 NOTE — TELEPHONE ENCOUNTER
You are scheduled for new PAP setup at [place] [facility] on 3/2/22 at 1PM.  [facility] [place] [facility], [address] [place] [address] (in strip mall on [address] corner of [place] You will need to ring doorbell at entrance to be buzzed into building for appointment. Contact number for  [employer] [facility] is [phone], please call if you need to make any changes to your appointment. Please contact your insurance company prior to appointment to confirm your benefits for durable medical equipment.

## 2022-02-23 NOTE — PATIENT INSTRUCTIONS
Equipment Instructions    We will process your PAP order and send it to a Durable Medical Equipment (DME) provider.    The medical equipment company should call you within 7 days.  If you have not heard from the company, please contact them to see if they received your order and are planning to call you.    Please call us at [phone] if you are unable to contact the medical equipment company or if they do not have the order.    If you are starting a new PAP machine, please call us after you use it the first night to let us know how it went. This call also helps us know that you received your equipment and that everything is ready. Please use our central phone number [phone]    Contact information for [email or web address] company:    [employer] [facility] Tel: [phone]

## 2022-02-23 NOTE — PROGRESS NOTES
Supriya is a 72 year old who is being evaluated via a billable video visit.      How would you like to obtain your AVS? MyChart  If the video visit is dropped, the invitation should be resent by: Send to e-mail at: [email or web address]  Will anyone else be joining your video visit? No    Video Start Time: 2:35 PM  Video-Visit Details    Type of service:  Video Visit    Video End Time:2:51 PM    Originating Location (pt. Location): Home    Distant Location (provider location):  [facility]     Platform used for Video Visit: ScaleGrid     Additional 10 minutes on the date of service was spent performing the following:    -Preparing to see the patient (eg, review of tests)   -Ordering medications, tests, or procedures   -Documenting clinical information in the electronic or other health record     Thank you for the opportunity to participate in the care of Supriya Herr.     She is a 72 year old  female patient who comes to the sleep medicine clinic for review of sleep study results. The study was completed on 02/07/22  which showed that the patient had moderate obstructive sleep apnea with an apnea hypopnea index of 19.3 events per hour. The patient was also found to have sleep related hypoxia and periodic limb movement in sleep.    Assessment and Plan:  In summary Supriya Herr is a 72 year old year old female here for review of sleep study results.  1. Obstructive Sleep Apnea  We had an extensive conversation to review the results of her sleep study and to  her on the importance of treating sleep apnea. Patient decided to proceed with CPAP. She will start using the device as soon as she receives it with the intention to use if for the entire night. We discussed some tips to increase PAP tolerance as well as the normal curve of adaptation. CPAP is going to provide improved respiratory function during the night but it can cause some sleep disruption that tends to improve  with continuous usage. She should return to the clinic in 7 weeks to review compliance and efficacy monitoring. Since the patient does not have any preference, we will use [employer] as the durable medical equipment company.     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)     Contact dermatitis and other eczema, due to unspecified cause     Dermatophytosis of nail     Generalized osteoarthrosis, unspecified site     Hypertension goal BP (blood pressure) < 140/90     Moderate nonproliferative diabetic retinopathy associated with type 2 diabetes mellitus (H)     Proteinuria     Stage I pressure ulcer     Hyperlipidemia LDL goal <100     Non compliance with medical treatment     Incontinence of urine     Basal cell carcinoma     Senile nuclear sclerosis     JONE (obstructive sleep apnea)     CHF (congestive heart failure) (H)     Health Care Home     Type 2 diabetes mellitus with diabetic chronic kidney disease (H)     Moderate recurrent major depression (H)     Type 2 diabetes mellitus with diabetic neuropathy, with long-term current use of insulin (H)     Macular cyst, hole, or pseudohole of retina     Traumatic amputation of left lower extremity above knee, subsequent encounter     Former tobacco use     Type 2 diabetes mellitus (H)     Dyslipidemia     Anemia in chronic kidney disease     CKD (chronic kidney disease) stage 5, GFR less than 15 ml/min (H)     Obesity (BMI 30-39.9)     Anxiety and depression     Cervical cancer screening     Diabetic foot infection (H)     Plantar ulcer of right foot with fat layer exposed (H)     Cellulitis     Intertrigo     Drug rash     Wheelchair bound     Combined forms of age-related cataract of right eye     Pseudophakia of left eye     Anemia, iron deficiency     Chronic kidney disease, stage 5, kidney failure (H)     Encounter regarding vascular access  for dialysis for ESRD (H)     Postoperative nausea     PVD (peripheral vascular disease) (H)     ESRD on dialysis (H)     Encounter regarding vascular access for dialysis for end-stage renal disease (H)     Encounter for adjustment and management of vascular access device     ESRD (end stage renal disease) (H)     Steal syndrome as complication of dialysis access (H)     Nausea     Infection due to 2019 novel coronavirus     Pneumonia due to COVID-19 virus     Hyperkalemia     ESRD (end stage renal disease) on dialysis (H)     Acute respiratory failure with hypoxia (H)     Pneumonia of right lower lobe due to infectious organism     Hypervolemia, unspecified hypervolemia type       Current Outpatient Medications   Medication Sig Dispense Refill     acetaminophen (TYLENOL) 325 MG tablet Take 2 tablets (650 mg) by mouth every 4 hours as needed for mild pain 50 tablet 0     albuterol (PROAIR HFA/PROVENTIL HFA/VENTOLIN HFA) 108 (90 Base) MCG/ACT inhaler Inhale 2 puffs into the lungs every 6 hours as needed for shortness of breath / dyspnea or wheezing 3 Inhaler 1     amLODIPine (NORVASC) 5 MG tablet Take 1 tablet (5 mg) by mouth 2 times daily 180 tablet 3     apremilast (OTEZLA) 30 MG tablet Take 1 tablet (30 mg) by mouth daily (Patient taking differently: Take 30 mg by mouth every morning ) 90 tablet 3     atorvastatin (LIPITOR) 20 MG tablet TAKE 1 TABLET BY MOUTH EVERY DAY IN THE EVENING (Patient taking differently: 20 mg every evening ) 90 tablet 0     blood glucose (ONE TOUCH DELICA) lancing device Device to be used with lancets.needs lancets device for delica lancets 1 each 1     blood glucose (ONETOUCH ULTRA) test strip TEST YOUR BLOOD SUGAR 3-4 TIMES PER DAY. 400 strip 3     carvedilol (COREG) 25 MG tablet Take 1 tablet (25 mg) by mouth 2 times daily (with meals) 180 tablet 3     ciclopirox (LOPROX) 0.77 % cream Apply topically 2 times daily 90 g 11     Continuous Blood Gluc Sensor (FREESTYLE PHOENIX 2 SENSOR) [facility]  "1 each every 14 days 1 each every 14 days. Change every 14 days. 2 each 5     desonide (DESOWEN) 0.05 % external ointment Apply topically as needed       diclofenac (VOLTAREN) 1 % topical gel Apply 4 g topically 4 times daily as needed for moderate pain 100 g 3     Epoetin Martínez (EPOGEN IJ) Inject 800 Units into the vein three times a week tues thurs sat at dialysis       furosemide (LASIX) 80 MG tablet Take 1 tablet (80 mg) by mouth 2 times daily 180 tablet 3     gabapentin (NEURONTIN) 100 MG capsule Take 1 capsule (100 mg) by mouth 4 times daily (Patient taking differently: Take 100 mg by mouth 3 times daily as needed ) 120 capsule 11     Glucagon (BAQSIMI ONE PACK) 3 MG/DOSE POWD Spray 3 mg in nostril See Admin Instructions USE ONLY FOR SEVERE HYPOGLYCEMIA. 1 each 3     insulin aspart (NOVOLOG FLEXPEN) 100 UNIT/ML pen 3 times daily (with meals) INJECT 5 UNITS SUBCUTANEOUSLY WITH BREAKFAST, LUNCH AND DINNER. 15 mL 3     insulin glargine (BASAGLAR KWIKPEN) 100 UNIT/ML pen Inject 18 units subcutaneous daily. (Patient taking differently: Inject 18 Units Subcutaneous At Bedtime Inject 18 units subcutaneous daily.) 15 mL 4     insulin pen needle (32G X 6 MM) 32G X 6 MM miscellaneous Use  pen needles daily or as directed. 50 each 0     insulin pen needle (BD ALEX U/F) 32G X 4 MM miscellaneous Use 4 daily with insulin injections. 400 each 3     insulin pen needle (ULTICARE MINI) 31G X 6 MM miscellaneous Use 4 times daily or as directed. 400 each 1     Iron Sucrose (VENOFER IV) Inject 50 mg into the vein twice a week At dialysis session (tues/Sat)       miconazole (MICATIN) 2 % external powder Apply twice daily to skin folds as needed (Patient taking differently: 2 times daily as needed Apply twice daily to skin folds as needed) 90 g 3     order for DME Equipment being ordered: mattress overlay for hospital bed  Wt. 192#  Height 5'5\"  99 months/Lifetime 1 Units 0     order for DME 1 wheelchair 1 Device 0     sertraline " (ZOLOFT) 25 MG tablet TAKE 2 TABLET BY MOUTH EVERY  tablet 6     sertraline (ZOLOFT) 50 MG tablet TAKE 1 TABLET BY MOUTH AT BEDTIME (Patient taking differently: 50 mg every evening ) 90 tablet 3     sevelamer carbonate (RENVELA) 800 MG tablet Take 1 tablet (800 mg) by mouth Take with snacks or supplements Take 2 capsules with meals and 1 with snacks. 90 tablet 3     triamcinolone (KENALOG) 0.025 % external ointment Apply topically 2 times daily To rash under breasts and groin as needed (Patient taking differently: Apply topically 2 times daily as needed To rash under breasts and groin as needed) 80 g 1     vitamin D3 (CHOLECALCIFEROL) 50 mcg (2000 units) tablet Take 1 tablet (50 mcg) by mouth daily (Patient taking differently: Take 50 mcg by mouth every evening ) 100 tablet 3       Allergies   Allergen Reactions     Penicillins Rash     Unasyn Rash     No evidence SJS, but very uncomfortable and precipitated multiple provider visits. Would not use penicillins again if other options available.        Physical Exam:  GEN: NAD  Psych: normal mood, normal affect     Labs/Studies:  - We reviewed the results of the overnight study as described on the HPI.     No results found for: PH, PHARTERIAL, PO2, [id number], SAT, PCO2, HCO3, BASEEXCESS, LACIE, BEB  Lab Results   Component Value Date    TSH 2.93 01/17/2020    TSH 2.90 01/09/2018     Lab Results   Component Value Date     (H) 02/02/2022     (H) 11/26/2021     Lab Results   Component Value Date    HGB 9.2 (L) 11/24/2021    HGB 10.0 (L) 11/23/2021     Lab Results   Component Value Date    BUN 37 (H) 11/24/2021    BUN 69 (H) 11/23/2021    CR 4.39 (H) 11/24/2021    CR 6.43 (H) 11/23/2021     Lab Results   Component Value Date    AST 15 11/22/2021    AST 14 08/17/2021    ALT 20 11/22/2021    ALT 17 08/17/2021    ALKPHOS 121 11/22/2021    ALKPHOS 96 08/17/2021    BILITOTAL 0.3 11/22/2021    BILITOTAL 0.4 08/17/2021     No results found for: UAMP, UBARB,  BENZODIAZEUR, UCANN, UCOC, OPIT, UPCP    Recent Labs   Lab Test 02/02/22  1005 02/02/22  0906 11/26/21  1211 11/24/21  1218 11/24/21  0658 11/23/21  1424 11/23/21  0634   NA  --   --   --   --  143  --  139   POTASSIUM 4.7  --   --   --  3.8  --  6.6*   CHLORIDE  --   --   --   --  106  --  109   CO2  --   --   --   --  31  --  23   ANIONGAP  --   --   --   --  6  --  7   GLC  --  173* 116*   < > 78   < > 94   BUN  --   --   --   --  37*  --  69*   CR  --   --   --   --  4.39*  --  6.43*   JODY  --   --   --   --  8.6  --  9.3    < > = values in this interval not displayed.       Ferritin   Date Value Ref Range Status   05/20/2020 526 (H) 8 - 252 ng/mL Final         Patient verbalized understanding of these issues, agrees with the plan and all questions were answered today. Patient was given an opportuntity to voice any other symptoms or concerns not listed above. Patient did not have any other symptoms or concerns.      Wesley Alexander DO  Board Certified in Internal Medicine and Sleep Medicine      (Note created with Dragon voice recognition and unintended spelling errors and word substitutions may occur)

## 2022-02-24 DIAGNOSIS — Z99.3 WHEELCHAIR CONFINEMENT: Primary | ICD-10-CM

## 2022-02-24 NOTE — TELEPHONE ENCOUNTER
"Reason for Call:  Medication or medication refill:    Do you use a [facility]?  Name of the pharmacy and phone number for the current request: [facility]    Name of the medication requested: TIRE, 24\" MAG 5S PNEU INSERT HR PLST, QR AXLE BOLT LONG BIG BUTTON, TRESSA ASSEMBLY ULTRALIGHT SFTRL 6X1.5\", SCREW, HHCS 5/16-24 X 2-1/2, BEARING SPACER 5/16 X 0.380, NUT NYLOCK THIN 5/16-24    Other request: N/A    Can we leave a detailed message on this number? YES    Phone number patient can be reached at: Home number on file [phone] (home)    Best Time: ANY    Call taken on 2/24/2022 at 10:51 AM by Samantha Zapata        "

## 2022-02-24 NOTE — TELEPHONE ENCOUNTER
Dr. Jackson,    Please see message below    DME cued      When signed, to be faxed to [facility] [phone]    Kamryn Bloom RN  [facility]

## 2022-02-25 NOTE — PROGRESS NOTES
"RD assessing frailty. Saw for txp nancy 3/2018. Is on the kidney waitlist since 2018. Started HD in 2020. Receives a protein bar there. She reports very good appetite. She is living with her daughter for the past 5+ years. Pt/dtr report K/Phos wnl. Pt takes renvela the majority of the time she eats.    Not Frail (2/5 points) scored for reduced  and inability to complete walk test. Even though pt tests \"not frail\", strongly recommend improved mobility prior to proceeding with txp.       -- Weight: gradual weight gain from 180s over the past few years   -- Exhaustion/Energy Levels: some brief reduction in energy with transferring to the toilet, car, couch, etc from wheelchair, but this is just for a few minutes   -- Activity: pt has been working with PT since an Aug hospital stay    Additional Comments:   Pt had a BKA in 1989. She had a prosthesis >10 years ago, but it no longer fits. Dtr reports pt was living in a NH for some reason ~10 years ago and the PT there worked with Supriya really well and helped her get walking with prosthesis, but this wasn't kept up when pt returned home. Now that she is living with her dtr, they feel she would do better with rehab and getting a prosthesis. They are looking into a referral from PCP to get another prosthetic.     Recommended Interventions to Address Frailty:  We discussed importance of mobility and ability to stand, walk independently. Pt and dtr understand importance of this and are interested in pursuing a new prosthetic.     "

## 2022-02-27 ENCOUNTER — HEALTH MAINTENANCE LETTER (OUTPATIENT)
Age: 72
End: 2022-02-27

## 2022-02-28 ENCOUNTER — ALLIED HEALTH/NURSE VISIT (OUTPATIENT)
Dept: TRANSPLANT | Facility: CLINIC | Age: 72
End: 2022-02-28
Attending: SURGERY
Payer: MEDICARE

## 2022-02-28 ENCOUNTER — OFFICE VISIT (OUTPATIENT)
Dept: TRANSPLANT | Facility: CLINIC | Age: 72
End: 2022-02-28
Attending: SURGERY
Payer: MEDICARE

## 2022-02-28 DIAGNOSIS — Z76.82 ORGAN TRANSPLANT CANDIDATE: ICD-10-CM

## 2022-02-28 DIAGNOSIS — N18.6 ESRD (END STAGE RENAL DISEASE) (H): ICD-10-CM

## 2022-02-28 DIAGNOSIS — Z76.82 ORGAN TRANSPLANT CANDIDATE: Primary | ICD-10-CM

## 2022-02-28 PROCEDURE — 99213 OFFICE O/P EST LOW 20 MIN: CPT | Mod: 24 | Performed by: SURGERY
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
## 2022-02-28 NOTE — TELEPHONE ENCOUNTER
Order printed and signed and faxed to [facility] [employer] at [phone].  Hellen Bermeo,      [facility]

## 2022-03-02 ENCOUNTER — DOCUMENTATION ONLY (OUTPATIENT)
Dept: SLEEP MEDICINE | Facility: CLINIC | Age: 72
End: 2022-03-02
Payer: MEDICARE

## 2022-03-02 NOTE — PROGRESS NOTES
Patient was offered choice of vendor and chose [facility].  Patient Supriya Herr was set up at [facility] on March 2, 2022. Patient received a Resmed Airsense 11 Pressures were set at 5-20 cm H2O.   Patient s ramp is 4 cm H2O for Off and FLEX/EPR is 2.  Patient received a Resmed Mask name: Airfit N30i  Nasal mask size Standard, heated tubing and heated humidifier.  Patient does need to meet compliance. Patient has a follow up on 5/4/22 with Dr. Alexander.    Jud Paiz

## 2022-03-03 DIAGNOSIS — N18.5 CKD (CHRONIC KIDNEY DISEASE) STAGE 5, GFR LESS THAN 15 ML/MIN (H): ICD-10-CM

## 2022-03-03 NOTE — NURSING NOTE
Patient already has a follow up appointment with Dr. Alexander on 5/4/2022 for CPAP Complaince. No further actions needed.

## 2022-03-04 ENCOUNTER — MYC MEDICAL ADVICE (OUTPATIENT)
Dept: FAMILY MEDICINE | Facility: CLINIC | Age: 72
End: 2022-03-04
Payer: MEDICARE

## 2022-03-04 DIAGNOSIS — G47.33 OSA (OBSTRUCTIVE SLEEP APNEA): Primary | ICD-10-CM

## 2022-03-04 DIAGNOSIS — N18.5 CKD (CHRONIC KIDNEY DISEASE) STAGE 5, GFR LESS THAN 15 ML/MIN (H): ICD-10-CM

## 2022-03-05 DIAGNOSIS — E83.39 HYPERPHOSPHATEMIA: Primary | ICD-10-CM

## 2022-03-05 DIAGNOSIS — E78.00 HYPERCHOLESTEROLEMIA: ICD-10-CM

## 2022-03-05 RX ORDER — SEVELAMER CARBONATE 800 MG/1
1600 TABLET, FILM COATED ORAL
Qty: 180 TABLET | Refills: 0 | Status: SHIPPED | OUTPATIENT
Start: 2022-03-05 | End: 2022-03-14

## 2022-03-07 ENCOUNTER — MEDICAL CORRESPONDENCE (OUTPATIENT)
Dept: HEALTH INFORMATION MANAGEMENT | Facility: CLINIC | Age: 72
End: 2022-03-07

## 2022-03-07 ENCOUNTER — DOCUMENTATION ONLY (OUTPATIENT)
Dept: SLEEP MEDICINE | Facility: CLINIC | Age: 72
End: 2022-03-07
Payer: MEDICARE

## 2022-03-07 RX ORDER — ATORVASTATIN CALCIUM 20 MG/1
20 TABLET, FILM COATED ORAL EVERY EVENING
Qty: 90 TABLET | Refills: 3 | Status: SHIPPED | OUTPATIENT
Start: 2022-03-07 | End: 2023-02-20

## 2022-03-07 RX ORDER — SEVELAMER CARBONATE 800 MG/1
800 TABLET, FILM COATED ORAL
Qty: 90 TABLET | Refills: 3 | Status: SHIPPED | OUTPATIENT
Start: 2022-03-07 | End: 2024-03-01

## 2022-03-07 RX ORDER — SEVELAMER CARBONATE 800 MG/1
TABLET, FILM COATED ORAL
Qty: 540 TABLET | Refills: 1 | Status: SHIPPED | OUTPATIENT
Start: 2022-03-07 | End: 2023-06-12

## 2022-03-07 RX ORDER — SEVELAMER CARBONATE 800 MG/1
800 TABLET, FILM COATED ORAL
Qty: 90 TABLET | Refills: 3 | Status: SHIPPED | OUTPATIENT
Start: 2022-03-07 | End: 2022-05-13

## 2022-03-07 NOTE — PROGRESS NOTES
3 day Sleep therapy management telephone visit    Diagnostic AHI: 19.3  PSG    Confirmed with patient at time of call- Yes Patient is still interested in STM service       Subjective measures:  Spoke with Caroline, her daughter, and said things are going really good.         Objective data     Order Settings for PAP  CPAP min 5    CPAP max 20   Device settings from machine CPAP min 5.0     CPAP max 20.0      EPR Setting TWO    RESMED soft response  OFF     Assessment: Nighty usage most nights over four hours      Action plan: Patient to have 14 day STM visit. Patient has a follow up visit scheduled:   yes within 31-90 days of set up    Replacement device: No  STM ordered by provider: Yes     Total time spent on accessing and  interpreting remote patient PAP therapy data  5 minutes    Total time spent counseling, coaching  and reviewing PAP therapy data with patient  5 minutes    [id number] no

## 2022-03-07 NOTE — TELEPHONE ENCOUNTER
Please see med refill. Sent to PCP.     Thanks,  SHANTA Krause  [facility]

## 2022-03-07 NOTE — TELEPHONE ENCOUNTER
Requested Prescriptions   Pending Prescriptions Disp Refills     RENVELA 800 MG tablet [Pharmacy Med Name: RENVELA 800 MG TABLET] 540 tablet 1     Sig: TAKE TWO TABLETS WITH MEALS AND 1 TABLET WITH SNACKS       There is no refill protocol information for this order        Routing refill request to provider for review/approval because:  Drug not on the [facility] refill protocol     Nelida Gonzalez RN  [facility]

## 2022-03-07 NOTE — TELEPHONE ENCOUNTER
"Requested Prescriptions   Pending Prescriptions Disp Refills     atorvastatin (LIPITOR) 20 MG tablet [Pharmacy Med Name: ATORVASTATIN 20 MG TABLET] 90 tablet 0     Sig: TAKE 1 TABLET BY MOUTH EVERY DAY IN THE EVENING       Statins Protocol Failed - 3/5/2022  3:59 PM        Failed - LDL on file in past 12 months     Recent Labs   Lab Test 01/17/20  1204   LDL 74             Passed - No abnormal creatine kinase in past 12 months     Recent Labs   Lab Test 11/05/17  0903   CKT 18*                Passed - Recent (12 mo) or future (30 days) visit within the authorizing provider's specialty     Patient has had an office visit with the authorizing provider or a provider within the authorizing providers department within the previous 12 mos or has a future within next 30 days. See \"Patient Info\" tab in inbasket, or \"Choose Columns\" in Meds & Orders section of the refill encounter.              Passed - Medication is active on med list        Passed - Patient is age 18 or older        Passed - No active pregnancy on record        Passed - No positive pregnancy test in past 12 months           Routing refill request to provider for review/approval because:  Labs not current:  LDL    Nelida Gonzalez RN  [facility]           "

## 2022-03-08 DIAGNOSIS — E83.39 HYPERPHOSPHATEMIA: ICD-10-CM

## 2022-03-10 ENCOUNTER — TELEPHONE (OUTPATIENT)
Dept: TRANSPLANT | Facility: CLINIC | Age: 72
End: 2022-03-10
Payer: MEDICARE

## 2022-03-10 DIAGNOSIS — Z76.82 ORGAN TRANSPLANT CANDIDATE: ICD-10-CM

## 2022-03-10 DIAGNOSIS — N18.6 ESRD (END STAGE RENAL DISEASE) (H): ICD-10-CM

## 2022-03-10 DIAGNOSIS — Z01.810 PRE-OPERATIVE CARDIOVASCULAR EXAMINATION: ICD-10-CM

## 2022-03-10 NOTE — TELEPHONE ENCOUNTER
Called pt to let them know they are due for yearly return waitlist visits with transplant nephrology, transplant surgery, and cards. Our schedulers will call to get these appts set up. Pt verbalized understanding of information and has no further questions. Encouraged to reach out if questions arise.

## 2022-03-11 ENCOUNTER — OFFICE VISIT (OUTPATIENT)
Dept: ORTHOPEDICS | Facility: CLINIC | Age: 72
End: 2022-03-11
Payer: MEDICARE

## 2022-03-11 ENCOUNTER — MEDICAL CORRESPONDENCE (OUTPATIENT)
Dept: HEALTH INFORMATION MANAGEMENT | Facility: CLINIC | Age: 72
End: 2022-03-11

## 2022-03-11 DIAGNOSIS — L84 TYLOMA: ICD-10-CM

## 2022-03-11 DIAGNOSIS — E11.49 TYPE II OR UNSPECIFIED TYPE DIABETES MELLITUS WITH NEUROLOGICAL MANIFESTATIONS, NOT STATED AS UNCONTROLLED(250.60) (H): Primary | ICD-10-CM

## 2022-03-11 DIAGNOSIS — B35.1 OM (ONYCHOMYCOSIS): ICD-10-CM

## 2022-03-11 DIAGNOSIS — I10 HYPERTENSION GOAL BP (BLOOD PRESSURE) < 140/90: ICD-10-CM

## 2022-03-11 PROCEDURE — 99213 OFFICE O/P EST LOW 20 MIN: CPT | Performed by: PODIATRIST

## 2022-03-11 NOTE — LETTER
3/11/2022         RE: Supriya Herr  [address]  [facility] [address]        Dear Colleague,    Thank you for referring your patient, Supriya Herr, to the [facility] [place]. Please see a copy of my visit note below.    Chief Complaint   Patient presents with     Follow Up     3 month follow up.               Allergies   Allergen Reactions     Penicillins Rash     Unasyn Rash     No evidence SJS, but very uncomfortable and precipitated multiple provider visits. Would not use penicillins again if other options available.          Subjective: Supriya is a 69 year old female who presents to the clinic today for a diabetic foot exam and management.  Her nails do get long.       Objective    Hemoglobin A1C POCT   Date Value Ref Range Status   04/09/2021 6.2 (H) 0 - 5.6 % Final     Comment:     Normal <5.7% Prediabetes 5.7-6.4%  Diabetes 6.5% or higher - adopted from ADA   consensus guidelines.       Hemoglobin A1C   Date Value Ref Range Status   11/23/2021 6.2 (H) 0.0 - 5.6 % Final     Comment:     Normal <5.7%   Prediabetes 5.7-6.4%    Diabetes 6.5% or higher     Note: Adopted from ADA consensus guidelines.         Non-palpable DP and PT pulses BL.   Equinus noted BL. Pes planus with rigid toe deformities noted to lesser digits on the right. Left AKA noted.   Nails are thickened, discolored, elongated, with subungual debris consistent with onychomycosis.          Assessment: DM2 with left AKA and neuropathy - presenting for a diabetic foot exam.   Onychomycosis.   Tyloma     Plan:   - Pt seen and evaluated  - Nails debrided x 5.  - Tyloma debrided x 1  - Cont compression socks.  - See again in 3 months.      Eleazar Pack, FRAN

## 2022-03-11 NOTE — NURSING NOTE
Reason For Visit:   Chief Complaint   Patient presents with     Follow Up     3 month follow up.          Allergies   Allergen Reactions     Penicillins Rash     Unasyn Rash     No evidence SJS, but very uncomfortable and precipitated multiple provider visits. Would not use penicillins again if other options available.            Emmy Begum LPN

## 2022-03-11 NOTE — PROGRESS NOTES
Chief Complaint   Patient presents with     Follow Up     3 month follow up.               Allergies   Allergen Reactions     Penicillins Rash     Unasyn Rash     No evidence SJS, but very uncomfortable and precipitated multiple provider visits. Would not use penicillins again if other options available.          Subjective: Supriya is a 69 year old female who presents to the clinic today for a diabetic foot exam and management.  Her nails do get long.       Objective    Hemoglobin A1C POCT   Date Value Ref Range Status   04/09/2021 6.2 (H) 0 - 5.6 % Final     Comment:     Normal <5.7% Prediabetes 5.7-6.4%  Diabetes 6.5% or higher - adopted from ADA   consensus guidelines.       Hemoglobin A1C   Date Value Ref Range Status   11/23/2021 6.2 (H) 0.0 - 5.6 % Final     Comment:     Normal <5.7%   Prediabetes 5.7-6.4%    Diabetes 6.5% or higher     Note: Adopted from ADA consensus guidelines.         Non-palpable DP and PT pulses BL.   Equinus noted BL. Pes planus with rigid toe deformities noted to lesser digits on the right. Left AKA noted.   Nails are thickened, discolored, elongated, with subungual debris consistent with onychomycosis.          Assessment: DM2 with left AKA and neuropathy - presenting for a diabetic foot exam.   Onychomycosis.   Tyloma     Plan:   - Pt seen and evaluated  - Nails debrided x 5.  - Tyloma debrided x 1  - Cont compression socks.  - See again in 3 months.

## 2022-03-14 RX ORDER — AMLODIPINE BESYLATE 5 MG/1
5 TABLET ORAL 2 TIMES DAILY
Qty: 180 TABLET | Refills: 3 | Status: SHIPPED | OUTPATIENT
Start: 2022-03-14 | End: 2023-05-18

## 2022-03-14 RX ORDER — SEVELAMER CARBONATE 800 MG/1
TABLET, FILM COATED ORAL
Qty: 180 TABLET | Refills: 0 | Status: SHIPPED | OUTPATIENT
Start: 2022-03-14 | End: 2022-05-13

## 2022-03-17 ENCOUNTER — DOCUMENTATION ONLY (OUTPATIENT)
Dept: SLEEP MEDICINE | Facility: CLINIC | Age: 72
End: 2022-03-17
Payer: MEDICARE

## 2022-03-17 NOTE — PROGRESS NOTES
14  DAY STM VISIT    Diagnostic AHI: 19.3  PSG    Subjective measures:  I spoke with patients daughter again. She said her mother is doing well. She said her mom complained that the CPAP seems to turn itself off. Daughter was unsure if she is taking her mask off during the night.     Assessment: Pt not meeting objective benchmarks for compliance  Patient failing following subjective benchmarks: pressure issues    Action plan: pt to have 30 day STM visit.      Device type: Auto-CPAP    PAP settings: CPAP min 5.0 cm  H20       CPAP max 20.0 cm  H20      95th% pressure 9.7 cm  H20        RESMED EPR level Setting: TWO    RESMED Soft response setting:  OFF    Mask type:  Nasal Mask    Objective measures: 14 day rolling measures      Compliance  57 %      Leak  25.52  lpm  last  upload      AHI 2.07   last  upload      Average number of minutes 239      Objective measure goal  Compliance   Goal >70%  Leak   Goal < 24 lpm  AHI  Goal < 5  Usage  Goal >240        Total time spent on accessing and interpreting remote patient PAP therapy data  5 minutes    Total time spent counseling, coaching  and reviewing PAP therapy data with patient  15 minutes    [id number]  [id number]  no (3 day STM)

## 2022-03-22 ENCOUNTER — MEDICAL CORRESPONDENCE (OUTPATIENT)
Dept: HEALTH INFORMATION MANAGEMENT | Facility: CLINIC | Age: 72
End: 2022-03-22
Payer: MEDICARE

## 2022-03-23 ENCOUNTER — TELEPHONE (OUTPATIENT)
Dept: TRANSPLANT | Facility: CLINIC | Age: 72
End: 2022-03-23
Payer: MEDICARE

## 2022-03-23 ENCOUNTER — TELEPHONE (OUTPATIENT)
Dept: FAMILY MEDICINE | Facility: CLINIC | Age: 72
End: 2022-03-23
Payer: MEDICARE

## 2022-03-23 DIAGNOSIS — Z74.01 BEDRIDDEN: Primary | ICD-10-CM

## 2022-03-23 DIAGNOSIS — N39.45 CONTINUOUS LEAKAGE OF URINE: ICD-10-CM

## 2022-03-23 NOTE — TELEPHONE ENCOUNTER
Called pts daughter to discuss need to schedule appts. Also needing to update pts current HD center as it is not listed in our chart. Left VM with direct line for return call.

## 2022-03-25 ENCOUNTER — TELEPHONE (OUTPATIENT)
Dept: TRANSPLANT | Facility: CLINIC | Age: 72
End: 2022-03-25
Payer: MEDICARE

## 2022-03-25 NOTE — TELEPHONE ENCOUNTER
Spoke with pts nephrology LPN, pt is currently dialyzing at [facility] in [facility]. Called HD center left a message with HD center RN with clinic number for pt to call and arrange needed updated appts.

## 2022-03-26 ENCOUNTER — MEDICAL CORRESPONDENCE (OUTPATIENT)
Dept: HEALTH INFORMATION MANAGEMENT | Facility: CLINIC | Age: 72
End: 2022-03-26
Payer: MEDICARE

## 2022-03-29 ENCOUNTER — DOCUMENTATION ONLY (OUTPATIENT)
Dept: ENDOCRINOLOGY | Facility: CLINIC | Age: 72
End: 2022-03-29
Payer: MEDICARE

## 2022-03-30 DIAGNOSIS — J12.82 PNEUMONIA DUE TO COVID-19 VIRUS: ICD-10-CM

## 2022-03-30 DIAGNOSIS — U07.1 PNEUMONIA DUE TO COVID-19 VIRUS: ICD-10-CM

## 2022-04-05 ENCOUNTER — DOCUMENTATION ONLY (OUTPATIENT)
Dept: SLEEP MEDICINE | Facility: CLINIC | Age: 72
End: 2022-04-05
Payer: MEDICARE

## 2022-04-05 RX ORDER — APIXABAN 2.5 MG/1
TABLET, FILM COATED ORAL
Qty: 60 TABLET | Refills: 0 | OUTPATIENT
Start: 2022-04-05

## 2022-04-05 NOTE — PROGRESS NOTES
30 DAY [facility] VISIT    Diagnostic AHI: 19.3  PSG    Subjective measures:   Spoke with patient and her daughter about compliance.  Patient received a new mask on Friday  and her CPAP use has increased the last 3 nights and she feels better more rested.    Assessment: Pt not meeting objective benchmarks for compliance  Patient meeting subjective benchmarks.   Action plan: 2 week [facility] recheck appt scheduled  Patient has scheduled a follow up visit with Dr. Alexander on 5/4/2022.   Device type: Auto-CPAP  PAP settings: CPAP min 5.0 cm  H20     CPAP max 20.0 cm  H20    95th% pressure 8.2 cm  H20      RESMED EPR level Setting: THREE    RESMED Soft response setting:  ON  Mask type:  Nasal Mask  Objective measures: 14 day rolling measures      Compliance  21 %      Leak  26.96 lpm  last  upload      AHI 0.93   last  upload      Average number of minutes 218      Objective measure goal  Compliance   Goal >70%  Leak   Goal < 24 lpm  AHI  Goal < 5  Usage  Goal >240        Total time spent on accessing and interpreting remote patient PAP therapy data  10 minutes    Total time spent counseling, coaching  and reviewing PAP therapy data with patient  12 minutes     [id number] this call  [id number] no  at 3 or 14 day [facility]

## 2022-04-08 NOTE — PROGRESS NOTES
Outcome for 04/08/22 12:30 PM: [employer] message sent  Kathy Villagomez, NITA  Outcome for 04/08/22 12:30 PM: Per patient, will send BG readings via [employer]  Kathy Villagomez, NITA  Outcome for 04/13/22 11:24 AM: Left Voicemail   Noreen Kumartere, VF  Outcome for 04/14/22 9:53 AM: Left Voicemail   Taylor Myhre, VF  Outcome for 04/15/22 2:35 PM: Glucose Readings sent via [employer]  Kathy Villagomez, INTA        Supriya Herr  is being evaluated via a billable video visit.      How would you like to obtain your AVS? R2G  For the video visit, send the invitation by: Text to cell phone: [phone]  Will anyone else be joining your video visit? Yes, daughter on same device      Pt states there are no changes to their allergy and medication list since last reviewed on 4/13/22.    Kathy Villagomez, VF

## 2022-04-13 ENCOUNTER — OFFICE VISIT (OUTPATIENT)
Dept: OPHTHALMOLOGY | Facility: CLINIC | Age: 72
End: 2022-04-13
Attending: OPHTHALMOLOGY
Payer: MEDICARE

## 2022-04-13 DIAGNOSIS — E11.3213 TYPE 2 DIABETES MELLITUS WITH MILD NONPROLIFERATIVE RETINOPATHY OF BOTH EYES AND MACULAR EDEMA, UNSPECIFIED WHETHER LONG TERM INSULIN USE (H): Primary | ICD-10-CM

## 2022-04-13 PROCEDURE — 92134 CPTRZ OPH DX IMG PST SGM RTA: CPT | Performed by: OPHTHALMOLOGY

## 2022-04-13 PROCEDURE — 92012 INTRM OPH EXAM EST PATIENT: CPT | Mod: GC | Performed by: OPHTHALMOLOGY

## 2022-04-13 PROCEDURE — G0463 HOSPITAL OUTPT CLINIC VISIT: HCPCS | Mod: 25

## 2022-04-13 ASSESSMENT — REFRACTION_WEARINGRX
OS_CYLINDER: +1.75
OS_SPHERE: -4.00
OD_SPHERE: -1.00
OD_AXIS: 093
OD_CYLINDER: +1.00
OS_SPHERE: -3.50
OD_SPHERE: -5.25
OD_CYLINDER: +1.50
OS_CYLINDER: +1.75
OS_AXIS: 135
OD_AXIS: 075
OS_AXIS: 133

## 2022-04-13 ASSESSMENT — VISUAL ACUITY
OS_PH_CC: 20/30
OS_PH_CC+: -1
OS_CC+: +1
OS_CC: 20/40
OD_CC+: +1
CORRECTION_TYPE: GLASSES
METHOD: SNELLEN - LINEAR
OD_CC: 20/40

## 2022-04-13 ASSESSMENT — TONOMETRY
OS_IOP_MMHG: 7
OD_IOP_MMHG: 6
IOP_METHOD: TONOPEN

## 2022-04-13 ASSESSMENT — CONF VISUAL FIELD
OS_INFERIOR_TEMPORAL_RESTRICTION: 3
OD_NORMAL: 1
OS_INFERIOR_NASAL_RESTRICTION: 1

## 2022-04-13 ASSESSMENT — SLIT LAMP EXAM - LIDS
COMMENTS: NORMAL
COMMENTS: NORMAL

## 2022-04-13 ASSESSMENT — CUP TO DISC RATIO
OS_RATIO: 0.3
OD_RATIO: 0.3

## 2022-04-13 NOTE — NURSING NOTE
Chief Complaints and History of Present Illnesses   Patient presents with     Diabetic Retinopathy Follow Up     4 month follow up for moderate NPDR and Diabetic macular edema each eye.   Patient states vision is stable each eye in the last few months.   Not using AT or warm compresses.      Chief Complaint(s) and History of Present Illness(es)     Diabetic Retinopathy Follow Up     Laterality: both eyes    Onset: months ago    Course: stable    Associated symptoms: tearing (OD>>OS), floaters (not new), itching (not new, with allergies) and discharge (green discharge OD since yesterday).  Negative for eye pain, redness and flashes    Pain scale: 0/10    Comments: 4 month follow up for moderate NPDR and Diabetic macular edema each eye.   Patient states vision is stable each eye in the last few months.   Not using AT or warm compresses.               Comments     DM2  Last BS: 110 this morning; >500 earlier in the week   Lab Results       Component                Value               Date                       A1C                      6.2                 11/23/2021                 A1C                      6.2                 04/09/2021                 A1C                      6.0                 06/22/2018                 A1C                      5.4                 03/29/2018                 A1C                      6.8                 01/09/2018                 A1C                      9.6                 06/09/2017              TRINA Grant 3:21 PM 04/13/2022

## 2022-04-13 NOTE — PROGRESS NOTES
CC: follow up Diabetic retinopathy   HPI: 71 year old here for NPDR and DME.     Interim: new mild morning discharge right eye. No redness no pain no decrease in vision.     Imaging:  Optical Coherence Tomography:4/12/22   right eye: microaneurysms and resolved cystoid macular edema.  lamellar hole, stable  Left eye: mild inferior IRF, MA - stable    fluorescein angiography: 12/01/21    Right eye: blockage of fluorescein angiography on the areas of heme; few microaneurysms; mild late Diabetic macular edema and PP leakage  No neovascularization elsewhere; no neovascularization of the disc.  Left eye: few microaneurysms; mild late Diabetic macular edema; staining of the peripheral Chorioretinal  scars  Stable    Assessment & Plan:  # Moderate nonproliferative diabetic retinopathy both eyes   # mild Diabetic macular edema both eyes  - mild stable edema in right eye, stable in left eye   Stable- observe  A1c 4/2021 6.2    # right eye - retinal macroaneurysm   at the sup temp margin of disc may contribute to macular edema;   - MA seems to be getting thrombosed   - status post avastin inj x2 for cystoid macular edema with improvement  - Last DOROTEO 8/15/19 (#1)  - now stable mild cystoid macular edema- will observe. Consider avastin if worsen    #. Mod Hypertensive retinopathy   - Stage 5 kidney disease   - blood pressure control has been poor in 160s/90s-100s  - improved control per patient recently     # Pseudophakia both eyes  8-11-20 right eye   Patient happy with her vision     #. History of Macula hole repair left eye by Dr. Daniel  S/p PPV, mp, air-fluid exchange, SF6 gas infusion, left eye 2/14/2012 by Dr. Daniel    Macula hole closed  Observe    # Dry eye syndrome   Status post punctal plugs   artificial tears  As needed and warm compresses     PLAN:  - Blood pressure (<120/80) and blood glucose (HbA1c <7.0) control discussed with patient.   - Patient advised that failure to adequately control each may lead to  vision loss. The patient expressed understanding.- improved on exam today     - follow up in 4 months with Optical Coherence Tomography    Kimberly Arvizu MD  Ophthalmology Resident PGY3  [facility]     ~~~~~~~~~~~~~~~~~~~~~~~~~~~~~~~~~~   Complete documentation of historical and exam elements from today's encounter can be found in the full encounter summary report (not reduplicated in this progress note).  I personally obtained the chief complaint(s) and history of present illness.  I confirmed and edited as necessary the review of systems, past medical/surgical history, family history, social history, and examination findings as documented by others; and I examined the patient myself.  I personally reviewed the relevant tests, images, and reports as documented above.  I formulated and edited as necessary the assessment and plan and discussed the findings and management plan with the patient and family    Leanne Jett MD   of Ophthalmology.  Retina Service   Department of Ophthalmology and Visual Neurosciences   [facility]  Phone: [phone]   Fax: [phone]

## 2022-04-14 ENCOUNTER — TELEPHONE (OUTPATIENT)
Dept: TRANSPLANT | Facility: CLINIC | Age: 72
End: 2022-04-14

## 2022-04-14 NOTE — TELEPHONE ENCOUNTER
Spoke to patient's daughter, Caroline, who was given our schedulers number to set up Supriya's [facility] appts.

## 2022-04-15 ENCOUNTER — VIRTUAL VISIT (OUTPATIENT)
Dept: ENDOCRINOLOGY | Facility: CLINIC | Age: 72
End: 2022-04-15
Payer: MEDICARE

## 2022-04-15 DIAGNOSIS — E55.9 VITAMIN D DEFICIENCY: ICD-10-CM

## 2022-04-15 DIAGNOSIS — E11.65 TYPE 2 DIABETES MELLITUS WITH HYPERGLYCEMIA, WITH LONG-TERM CURRENT USE OF INSULIN (H): Primary | ICD-10-CM

## 2022-04-15 DIAGNOSIS — Z79.4 TYPE 2 DIABETES MELLITUS WITH HYPERGLYCEMIA, WITH LONG-TERM CURRENT USE OF INSULIN (H): Primary | ICD-10-CM

## 2022-04-15 DIAGNOSIS — L40.8 INVERSE PSORIASIS: ICD-10-CM

## 2022-04-15 PROCEDURE — 99215 OFFICE O/P EST HI 40 MIN: CPT | Mod: 95 | Performed by: PHYSICIAN ASSISTANT
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
## 2022-04-18 ENCOUNTER — TELEPHONE (OUTPATIENT)
Dept: ENDOCRINOLOGY | Facility: CLINIC | Age: 72
End: 2022-04-18
Payer: MEDICARE

## 2022-04-20 ENCOUNTER — DOCUMENTATION ONLY (OUTPATIENT)
Dept: SLEEP MEDICINE | Facility: CLINIC | Age: 72
End: 2022-04-20
Payer: MEDICARE

## 2022-04-28 ENCOUNTER — DOCUMENTATION ONLY (OUTPATIENT)
Dept: SLEEP MEDICINE | Facility: CLINIC | Age: 72
End: 2022-04-28
Payer: MEDICARE

## 2022-05-02 ENCOUNTER — TELEPHONE (OUTPATIENT)
Dept: TRANSPLANT | Facility: CLINIC | Age: 72
End: 2022-05-02
Payer: MEDICARE

## 2022-05-02 NOTE — TELEPHONE ENCOUNTER
Patient Call: Voicemail  Date/Time: 05/02/2022 at 1:44pm  Reason for call: Pt family member called and left a VM with little information but wanting a call back.    5/2/22 1384 addendum: returned daughters call. Wondering how we can get appts arranged for pt. Pts daughter would like appts on Monday or Friday, after 11am, she would like appts to be scheduled and she will call to rearrange if unable to make it on that day. She will need neph, transplant surgery, and cards appts. Message sent to  to arrange.

## 2022-05-04 ENCOUNTER — VIRTUAL VISIT (OUTPATIENT)
Dept: SLEEP MEDICINE | Facility: CLINIC | Age: 72
End: 2022-05-04
Payer: MEDICARE

## 2022-05-04 VITALS — BODY MASS INDEX: 32.96 KG/M2 | WEIGHT: 210 LBS | HEIGHT: 67 IN

## 2022-05-04 DIAGNOSIS — G47.33 OBSTRUCTIVE SLEEP APNEA: Primary | ICD-10-CM

## 2022-05-04 DIAGNOSIS — G47.10 HYPERSOMNIA: ICD-10-CM

## 2022-05-04 PROCEDURE — 99213 OFFICE O/P EST LOW 20 MIN: CPT | Mod: 95 | Performed by: INTERNAL MEDICINE
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
## 2022-05-12 DIAGNOSIS — E11.40 TYPE 2 DIABETES MELLITUS WITH DIABETIC NEUROPATHY, WITH LONG-TERM CURRENT USE OF INSULIN (H): ICD-10-CM

## 2022-05-12 DIAGNOSIS — Z79.4 TYPE 2 DIABETES MELLITUS WITH DIABETIC NEUROPATHY, WITH LONG-TERM CURRENT USE OF INSULIN (H): ICD-10-CM

## 2022-05-12 DIAGNOSIS — E83.39 HYPERPHOSPHATEMIA: ICD-10-CM

## 2022-05-13 DIAGNOSIS — N18.5 CKD (CHRONIC KIDNEY DISEASE) STAGE 5, GFR LESS THAN 15 ML/MIN (H): ICD-10-CM

## 2022-05-13 RX ORDER — SEVELAMER CARBONATE 800 MG/1
1600 TABLET, FILM COATED ORAL
Qty: 180 TABLET | Refills: 11 | Status: SHIPPED | OUTPATIENT
Start: 2022-05-13 | End: 2024-03-01

## 2022-05-13 RX ORDER — INSULIN GLARGINE 100 [IU]/ML
INJECTION, SOLUTION SUBCUTANEOUS
Qty: 30 ML | Refills: 1 | Status: SHIPPED | OUTPATIENT
Start: 2022-05-13 | End: 2023-06-12

## 2022-05-13 RX ORDER — SEVELAMER CARBONATE 800 MG/1
TABLET, FILM COATED ORAL
Qty: 540 TABLET | Refills: 1 | Status: SHIPPED | OUTPATIENT
Start: 2022-05-13 | End: 2022-05-15

## 2022-05-13 NOTE — NURSING NOTE
DME orders have been automatically faxed to [facility].  1 year appointment reminder will be sent via My Chart. Kitty Chatman CMA

## 2022-05-13 NOTE — TELEPHONE ENCOUNTER
BASAGLAR 100 UNIT/ML KWIKPEN  Last Written Prescription Date:  ?  Last Fill Quantity: ?,   # refills: ?  Last Office Visit :  4/15/2022  Future Office visit:   8/5/2022    Routing refill request to provider for review/approval because:  Drug not on the [facility], [facility] or  Health refill protocol or controlled substance      Cecy Pham RN  Central Triage Red Flags/Med Refills

## 2022-05-15 DIAGNOSIS — N18.6 ESRD (END STAGE RENAL DISEASE) ON DIALYSIS (H): Primary | ICD-10-CM

## 2022-05-15 DIAGNOSIS — E83.39 HYPERPHOSPHATEMIA: ICD-10-CM

## 2022-05-15 DIAGNOSIS — Z99.2 ESRD (END STAGE RENAL DISEASE) ON DIALYSIS (H): Primary | ICD-10-CM

## 2022-05-15 RX ORDER — SEVELAMER CARBONATE 800 MG/1
1600 TABLET, FILM COATED ORAL
Qty: 540 TABLET | Refills: 3 | Status: SHIPPED | OUTPATIENT
Start: 2022-05-15 | End: 2024-03-01

## 2022-05-23 NOTE — TELEPHONE ENCOUNTER
RECORDS RECEIVED FROM:   DATE RECEIVED:   NOTES STATUS DETAILS   OFFICE NOTE from referring provider    Internal SOT   OFFICE NOTE from other cardiologist    N/A    DISCHARGE SUMMARY from hospital    Internal    DISCHARGE REPORT from the ER   N/A    OPERATIVE REPORT    N/A    MEDICATION LIST   Internal    LABS     BMP   Internal 11-24-21   CBC   Internal 8-23-21   CMP   Internal 8-17-21   Lipids   N/A    TSH   N/A    DIAGNOSTIC PROCEDURES     EKG   Internal 11-23-21   Monitor Reports   N/A    IMAGING (DISC & REPORT)      Echo   Internal 11-24-21   Stress Tests   N/A    Cath   N/A    MRI/MRA   N/A    CT/CTA   N/A

## 2022-05-27 ENCOUNTER — MEDICAL CORRESPONDENCE (OUTPATIENT)
Dept: HEALTH INFORMATION MANAGEMENT | Facility: CLINIC | Age: 72
End: 2022-05-27
Payer: MEDICARE
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
## 2022-06-03 ENCOUNTER — OFFICE VISIT (OUTPATIENT)
Dept: DERMATOLOGY | Facility: CLINIC | Age: 72
End: 2022-06-03
Payer: MEDICARE

## 2022-06-03 DIAGNOSIS — Z85.828 HISTORY OF NONMELANOMA SKIN CANCER: ICD-10-CM

## 2022-06-03 DIAGNOSIS — L72.0 MILIA: ICD-10-CM

## 2022-06-03 DIAGNOSIS — Z87.2 HISTORY OF SKIN ULCER: Primary | ICD-10-CM

## 2022-06-03 DIAGNOSIS — D18.01 CHERRY ANGIOMA: ICD-10-CM

## 2022-06-03 DIAGNOSIS — D22.9 MULTIPLE BENIGN NEVI: ICD-10-CM

## 2022-06-03 DIAGNOSIS — M25.50 ARTHRALGIA, UNSPECIFIED JOINT: ICD-10-CM

## 2022-06-03 DIAGNOSIS — L30.4 INTERTRIGO: ICD-10-CM

## 2022-06-03 DIAGNOSIS — L82.1 SEBORRHEIC KERATOSIS: ICD-10-CM

## 2022-06-03 DIAGNOSIS — L40.8 INVERSE PSORIASIS: ICD-10-CM

## 2022-06-03 PROCEDURE — 99214 OFFICE O/P EST MOD 30 MIN: CPT | Performed by: DERMATOLOGY

## 2022-06-03 ASSESSMENT — PAIN SCALES - GENERAL: PAINLEVEL: MODERATE PAIN (5)
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
## 2022-06-03 NOTE — PATIENT INSTRUCTIONS
Patient Education     Checking for Skin Cancer  You can find cancer early by checking your skin each month. There are 3 kinds of skin cancer. They are melanoma, basal cell carcinoma, and squamous cell carcinoma. Doing monthly skin checks is the best way to find new marks or skin changes. Follow the instructions below for checking your skin.   The ABCDEs of checking moles for melanoma   Check your moles or growths for signs of melanoma using ABCDE:   Asymmetry: the sides of the mole or growth don t match  Border: the edges are ragged, notched, or blurred  Color: the color within the mole or growth varies  Diameter: the mole or growth is larger than 6 mm (size of a pencil eraser)  Evolving: the size, shape, or color of the mole or growth is changing (evolving is not shown in the images below)    Checking for other types of skin cancer  Basal cell carcinoma or squamous cell carcinoma have symptoms such as:     A spot or mole that looks different from all other marks on your skin  Changes in how an area feels, such as itching, tenderness, or pain  Changes in the skin's surface, such as oozing, bleeding, or scaliness  A sore that does not heal  New swelling or redness beyond the border of a mole    Who s at risk?  Anyone can get skin cancer. But you are at greater risk if you have:   Fair skin, light-colored hair, or light-colored eyes  Many moles or abnormal moles on your skin  A history of sunburns from sunlight or tanning beds  A family history of skin cancer  A history of exposure to radiation or chemicals  A weakened immune system  If you have had skin cancer in the past, you are at risk for recurring skin cancer.   How to check your skin  Do your monthly skin checkups in front of a full-length mirror. Check all parts of your body, including your:   Head (ears, face, neck, and scalp)  Torso (front, back, and sides)  Arms (tops, undersides, upper, and lower armpits)  Hands (palms, backs, and fingers, including  under the nails)  Buttocks and genitals  Legs (front, back, and sides)  Feet (tops, soles, toes, including under the nails, and between toes)  If you have a lot of moles, take digital photos of them each month. Make sure to take photos both up close and from a distance. These can help you see if any moles change over time.   Most skin changes are not cancer. But if you see any changes in your skin, call your doctor right away. Only he or she can diagnose a problem. If you have skin cancer, seeing your doctor can be the first step toward getting the treatment that could save your life.   [employer] last reviewed this educational content on 4/1/2019 2000-2020 [employer] [address] [place] [address], [place] [address]. All rights reserved. This information is not intended as a substitute for professional medical care. Always follow your healthcare professional's instructions.       When should I call my doctor?  If you are worsening or not improving, please, contact us or seek urgent care as noted below.     Who should I call with questions (adults)?  [facility] (adult and pediatric): [phone]  [facility] (adult): [phone]  For urgent needs outside of business hours call the [facility] at [phone] and ask for the dermatology resident on call to be paged  If this is a medical emergency and you are unable to reach an ER, Call [phone]    Who should I call with questions (pediatric)?  [facility]- Pediatric Dermatology  Dr. Tanya Sifuentes, Dr. Joan Padilla, Dr. Ciara Melvin, MARY Gonzáles, Dr. Lauren Callahan, Dr. Raina Wilkinson & Dr. Dusty Armstrong  Non-urgent nurse triage line; [phone]- Anaid and Rylee WOMACK Care Coordinatorkian Cox (/Complex ) [phone]    If you need a prescription refill, please contact your pharmacy. Refills are approved or denied by our  Physicians during normal business hours, Monday through Fridays  Per office policy, refills will not be granted if you have not been seen within the past year (or sooner depending on your child's condition)    Scheduling Information:  Pediatric Appointment Scheduling and Call Center [phone]  Radiology Scheduling- [phone]  Sedation Unit Scheduling- [phone]  [place] Scheduling- General [phone]; Pediatric Dermatology [phone]  Main  Services: [phone]  Yakut: [phone]  Colombian: [phone]  Hmong/Citizen of [place]/Kazakh: [phone]  Preadmission Nursing Department Fax Number: [phone] (Fax all pre-operative paperwork to this number)    For urgent matters arising during evenings, weekends, or holidays that cannot wait for normal business hours please call [phone] and ask for the dermatology resident on call to be paged.

## 2022-06-03 NOTE — NURSING NOTE
Dermatology Rooming Note    Supriya Herr's goals for this visit include:   Chief Complaint   Patient presents with     Skin Check     Supriya is here for a skin check and has no spots of concern.      Joe Gross, EMT

## 2022-06-06 ENCOUNTER — VIRTUAL VISIT (OUTPATIENT)
Dept: NEPHROLOGY | Facility: CLINIC | Age: 72
End: 2022-06-06
Attending: PHYSICIAN ASSISTANT
Payer: MEDICARE

## 2022-06-06 DIAGNOSIS — Z76.82 ORGAN TRANSPLANT CANDIDATE: ICD-10-CM

## 2022-06-06 DIAGNOSIS — Z01.810 PRE-OPERATIVE CARDIOVASCULAR EXAMINATION: ICD-10-CM

## 2022-06-06 DIAGNOSIS — N18.6 ESRD (END STAGE RENAL DISEASE) (H): ICD-10-CM

## 2022-06-06 PROCEDURE — G0463 HOSPITAL OUTPT CLINIC VISIT: HCPCS | Mod: PN,RTG | Performed by: PHYSICIAN ASSISTANT

## 2022-06-06 PROCEDURE — 99214 OFFICE O/P EST MOD 30 MIN: CPT | Mod: 95 | Performed by: PHYSICIAN ASSISTANT

## 2022-06-06 ASSESSMENT — PAIN SCALES - GENERAL: PAINLEVEL: MODERATE PAIN (4)
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
## 2022-06-08 DIAGNOSIS — E55.9 VITAMIN D DEFICIENCY: ICD-10-CM

## 2022-06-08 RX ORDER — CHOLECALCIFEROL (VITAMIN D3) 50 MCG
50 TABLET ORAL DAILY
Qty: 100 TABLET | Refills: 3 | Status: SHIPPED | OUTPATIENT
Start: 2022-06-08 | End: 2023-05-23

## 2022-06-09 ENCOUNTER — DOCUMENTATION ONLY (OUTPATIENT)
Dept: TRANSPLANT | Facility: CLINIC | Age: 72
End: 2022-06-09

## 2022-06-19 ENCOUNTER — HEALTH MAINTENANCE LETTER (OUTPATIENT)
Age: 72
End: 2022-06-19

## 2022-06-24 ENCOUNTER — TELEPHONE (OUTPATIENT)
Dept: ENDOCRINOLOGY | Facility: CLINIC | Age: 72
End: 2022-06-24

## 2022-06-24 DIAGNOSIS — Z79.4 TYPE 2 DIABETES MELLITUS WITH HYPERGLYCEMIA, WITH LONG-TERM CURRENT USE OF INSULIN (H): ICD-10-CM

## 2022-06-24 DIAGNOSIS — E11.65 TYPE 2 DIABETES MELLITUS WITH HYPERGLYCEMIA, WITH LONG-TERM CURRENT USE OF INSULIN (H): ICD-10-CM

## 2022-06-24 NOTE — TELEPHONE ENCOUNTER
[facility] Call Center    Phone Message    May a detailed message be left on voicemail: yes     Reason for Call: Medication Refill Request    Has the patient contacted the pharmacy for the refill? Yes   Name of medication being requested:    Continuous Blood Gluc Sensor (FREESTYLE PHOENIX 2 SENSOR) [facility]     Provider who prescribed the medication: Arabella Kamara PA-C  Pharmacy: [id number] - [place], [place] - [address]  Date medication is needed: as soon as possible, per daughter patient only has 1 day remaining         Action Taken: Message routed to:  [facility] ([facility] endo    Travel Screening: Not Applicable

## 2022-06-24 NOTE — TELEPHONE ENCOUNTER
Last Clinic Visit: 4/15/2022  [facility] [place]   Recommended 4 month follow up  [place] 8/5/22    Call to Caroline, message left of prescription sent to requested pharmacy

## 2022-06-27 ENCOUNTER — MYC MEDICAL ADVICE (OUTPATIENT)
Dept: FAMILY MEDICINE | Facility: CLINIC | Age: 72
End: 2022-06-27

## 2022-06-27 ENCOUNTER — PRE VISIT (OUTPATIENT)
Dept: CARDIOLOGY | Facility: CLINIC | Age: 72
End: 2022-06-27
Payer: MEDICARE

## 2022-06-27 ENCOUNTER — OFFICE VISIT (OUTPATIENT)
Dept: CARDIOLOGY | Facility: CLINIC | Age: 72
End: 2022-06-27
Attending: INTERNAL MEDICINE
Payer: MEDICARE

## 2022-06-27 ENCOUNTER — OFFICE VISIT (OUTPATIENT)
Dept: TRANSPLANT | Facility: CLINIC | Age: 72
End: 2022-06-27
Attending: PHYSICIAN ASSISTANT
Payer: MEDICARE

## 2022-06-27 VITALS
HEIGHT: 66 IN | BODY MASS INDEX: 33.43 KG/M2 | DIASTOLIC BLOOD PRESSURE: 63 MMHG | HEART RATE: 58 BPM | WEIGHT: 208 LBS | SYSTOLIC BLOOD PRESSURE: 170 MMHG | OXYGEN SATURATION: 100 %

## 2022-06-27 VITALS
OXYGEN SATURATION: 97 % | WEIGHT: 208 LBS | HEART RATE: 57 BPM | DIASTOLIC BLOOD PRESSURE: 68 MMHG | SYSTOLIC BLOOD PRESSURE: 116 MMHG | BODY MASS INDEX: 32.58 KG/M2

## 2022-06-27 DIAGNOSIS — N18.5 ANEMIA DUE TO STAGE 5 CHRONIC KIDNEY DISEASE, NOT ON CHRONIC DIALYSIS (H): ICD-10-CM

## 2022-06-27 DIAGNOSIS — N18.6 TYPE 2 DIABETES MELLITUS WITH CHRONIC KIDNEY DISEASE ON CHRONIC DIALYSIS, WITH LONG-TERM CURRENT USE OF INSULIN (H): ICD-10-CM

## 2022-06-27 DIAGNOSIS — D63.1 ANEMIA DUE TO STAGE 5 CHRONIC KIDNEY DISEASE, NOT ON CHRONIC DIALYSIS (H): ICD-10-CM

## 2022-06-27 DIAGNOSIS — Z76.82 ORGAN TRANSPLANT CANDIDATE: ICD-10-CM

## 2022-06-27 DIAGNOSIS — Z99.2 TYPE 2 DIABETES MELLITUS WITH CHRONIC KIDNEY DISEASE ON CHRONIC DIALYSIS, WITH LONG-TERM CURRENT USE OF INSULIN (H): ICD-10-CM

## 2022-06-27 DIAGNOSIS — I10 HYPERTENSION, UNCONTROLLED: ICD-10-CM

## 2022-06-27 DIAGNOSIS — Z79.4 TYPE 2 DIABETES MELLITUS WITH CHRONIC KIDNEY DISEASE ON CHRONIC DIALYSIS, WITH LONG-TERM CURRENT USE OF INSULIN (H): ICD-10-CM

## 2022-06-27 DIAGNOSIS — N18.6 ESRD (END STAGE RENAL DISEASE) (H): ICD-10-CM

## 2022-06-27 DIAGNOSIS — Z01.810 PRE-OPERATIVE CARDIOVASCULAR EXAMINATION: Primary | ICD-10-CM

## 2022-06-27 DIAGNOSIS — Z01.810 PRE-OPERATIVE CARDIOVASCULAR EXAMINATION: ICD-10-CM

## 2022-06-27 DIAGNOSIS — N18.5 CKD (CHRONIC KIDNEY DISEASE) STAGE 5, GFR LESS THAN 15 ML/MIN (H): ICD-10-CM

## 2022-06-27 DIAGNOSIS — E11.22 TYPE 2 DIABETES MELLITUS WITH CHRONIC KIDNEY DISEASE ON CHRONIC DIALYSIS, WITH LONG-TERM CURRENT USE OF INSULIN (H): ICD-10-CM

## 2022-06-27 PROCEDURE — 99214 OFFICE O/P EST MOD 30 MIN: CPT | Performed by: SURGERY

## 2022-06-27 PROCEDURE — 40000724 ZZH UMP OPEN ENCOUNTER >70 DAYS: Mod: 27

## 2022-06-27 PROCEDURE — G0463 HOSPITAL OUTPT CLINIC VISIT: HCPCS | Mod: 27

## 2022-06-27 PROCEDURE — 99203 OFFICE O/P NEW LOW 30 MIN: CPT | Performed by: INTERNAL MEDICINE

## 2022-06-27 RX ORDER — CINACALCET 30 MG/1
30 TABLET, FILM COATED ORAL DAILY
COMMUNITY

## 2022-06-27 RX ORDER — FUROSEMIDE 80 MG
80 TABLET ORAL 2 TIMES DAILY
Qty: 180 TABLET | Refills: 3 | Status: SHIPPED | OUTPATIENT
Start: 2022-06-27 | End: 2023-06-12

## 2022-06-27 ASSESSMENT — PAIN SCALES - GENERAL: PAINLEVEL: NO PAIN (0)
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
## 2022-06-27 NOTE — NURSING NOTE
Chief Complaint   Patient presents with     New Patient     kidney transplant eval        Vitals were taken and medications were reconciled.   Darek Slade, EMT  3:00 PM
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
## 2022-06-27 NOTE — PATIENT INSTRUCTIONS
Dr. Dickerson will discuss your care plan/testing with your transplant team and you will be notified soon with the outcome of that discussion.
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
## 2022-06-28 DIAGNOSIS — N18.6 ESRD (END STAGE RENAL DISEASE) (H): Primary | ICD-10-CM

## 2022-06-28 DIAGNOSIS — Z76.82 ORGAN TRANSPLANT CANDIDATE: ICD-10-CM

## 2022-06-28 DIAGNOSIS — I50.9 CHF (CONGESTIVE HEART FAILURE) (H): ICD-10-CM

## 2022-06-28 DIAGNOSIS — Z01.810 PRE-OPERATIVE CARDIOVASCULAR EXAMINATION: ICD-10-CM

## 2022-06-28 RX ORDER — POTASSIUM CHLORIDE 1500 MG/1
40 TABLET, EXTENDED RELEASE ORAL
Status: CANCELLED | OUTPATIENT
Start: 2022-06-28

## 2022-06-28 RX ORDER — POTASSIUM CHLORIDE 1500 MG/1
20 TABLET, EXTENDED RELEASE ORAL
Status: CANCELLED | OUTPATIENT
Start: 2022-06-28

## 2022-06-28 RX ORDER — ASPIRIN 81 MG/1
243 TABLET, CHEWABLE ORAL ONCE
Status: CANCELLED | OUTPATIENT
Start: 2022-06-28

## 2022-06-28 RX ORDER — SODIUM CHLORIDE 9 MG/ML
INJECTION, SOLUTION INTRAVENOUS CONTINUOUS
Status: CANCELLED | OUTPATIENT
Start: 2022-06-28

## 2022-06-28 RX ORDER — ASPIRIN 325 MG
325 TABLET ORAL ONCE
Status: CANCELLED | OUTPATIENT
Start: 2022-06-28 | End: 2022-06-28

## 2022-06-28 RX ORDER — LIDOCAINE 40 MG/G
CREAM TOPICAL
Status: CANCELLED | OUTPATIENT
Start: 2022-06-28

## 2022-07-05 ENCOUNTER — DOCUMENTATION ONLY (OUTPATIENT)
Dept: SLEEP MEDICINE | Facility: CLINIC | Age: 72
End: 2022-07-05

## 2022-07-05 NOTE — PROGRESS NOTES
RECEIVED EMAIL FROM PT'S DAUGHTER ASKING ABOUT LEAKS AND ORDERING NEW SUPPLIES. I WAS OUT OF OFFICE FOR 2 WEEKS AND EMAILED HER BACK WHEN I RETURNED. I SEE WE DID SHIP SUPPLIES ON THE SAME DAY SHE EMAILED ME SO I ASKED IF THAT CORRECTED THE ISSUE. SUGGESTED DISCONNECTING TUBING AND WATER CHAMBER IF SHE IS GETTING LEAKS. WHEN RECONNECTING THESE MAKE SURE THEY ARE GOING ON STRAIGHT. SHE SAID PART OF THE ISSUE IS PT IS GETTING CONDENSATION IN HER TUBE. SUGGESTED PT TURN DOWN THE HUMIDITY OR PULL STD TUBING UNDER THE COVERS TO ELIMINATE THE MOISTURE BUILD UP.

## 2022-07-08 ENCOUNTER — OFFICE VISIT (OUTPATIENT)
Dept: ORTHOPEDICS | Facility: CLINIC | Age: 72
End: 2022-07-08
Payer: MEDICARE

## 2022-07-08 DIAGNOSIS — L84 TYLOMA: ICD-10-CM

## 2022-07-08 DIAGNOSIS — B35.1 OM (ONYCHOMYCOSIS): ICD-10-CM

## 2022-07-08 DIAGNOSIS — E11.49 TYPE II OR UNSPECIFIED TYPE DIABETES MELLITUS WITH NEUROLOGICAL MANIFESTATIONS, NOT STATED AS UNCONTROLLED(250.60) (H): Primary | ICD-10-CM

## 2022-07-08 PROCEDURE — 11055 PARING/CUTG B9 HYPRKER LES 1: CPT | Mod: Q7 | Performed by: PODIATRIST

## 2022-07-08 PROCEDURE — 99213 OFFICE O/P EST LOW 20 MIN: CPT | Mod: 25 | Performed by: PODIATRIST

## 2022-07-08 PROCEDURE — 11720 DEBRIDE NAIL 1-5: CPT | Mod: XS | Performed by: PODIATRIST

## 2022-07-08 NOTE — PROGRESS NOTES
Chief Complaint   Patient presents with     Follow Up     3 month follow up.               Allergies   Allergen Reactions     Penicillins Rash     Unasyn Rash     No evidence SJS, but very uncomfortable and precipitated multiple provider visits. Would not use penicillins again if other options available.          Subjective: Supriya is a 72 year old female who presents to the clinic today for a diabetic foot exam and management.  Her nails do get long.       Objective    Hemoglobin A1C POCT   Date Value Ref Range Status   04/09/2021 6.2 (H) 0 - 5.6 % Final     Comment:     Normal <5.7% Prediabetes 5.7-6.4%  Diabetes 6.5% or higher - adopted from ADA   consensus guidelines.       Hemoglobin A1C   Date Value Ref Range Status   11/23/2021 6.2 (H) 0.0 - 5.6 % Final     Comment:     Normal <5.7%   Prediabetes 5.7-6.4%    Diabetes 6.5% or higher     Note: Adopted from ADA consensus guidelines.         Non-palpable DP and PT pulses BL.   Equinus noted BL. Pes planus with rigid toe deformities noted to lesser digits on the right. Left AKA noted.   Nails are thickened, discolored, elongated, with subungual debris consistent with onychomycosis.          Assessment: DM2 with left AKA and neuropathy - presenting for a diabetic foot exam.   Onychomycosis.   Tyloma     Plan:   - Pt seen and evaluated  - Nails debrided x 5.  - Tyloma debrided x 1  - Cont compression socks.  - See again in 3 months.

## 2022-07-08 NOTE — LETTER
7/8/2022         RE: Supriya Herr  [address]  [facility] [address]        Dear Colleague,    Thank you for referring your patient, Supriya Herr, to the [facility] [place]. Please see a copy of my visit note below.    Chief Complaint   Patient presents with     Follow Up     3 month follow up.               Allergies   Allergen Reactions     Penicillins Rash     Unasyn Rash     No evidence SJS, but very uncomfortable and precipitated multiple provider visits. Would not use penicillins again if other options available.          Subjective: Supriya is a 72 year old female who presents to the clinic today for a diabetic foot exam and management.  Her nails do get long.       Objective    Hemoglobin A1C POCT   Date Value Ref Range Status   04/09/2021 6.2 (H) 0 - 5.6 % Final     Comment:     Normal <5.7% Prediabetes 5.7-6.4%  Diabetes 6.5% or higher - adopted from ADA   consensus guidelines.       Hemoglobin A1C   Date Value Ref Range Status   11/23/2021 6.2 (H) 0.0 - 5.6 % Final     Comment:     Normal <5.7%   Prediabetes 5.7-6.4%    Diabetes 6.5% or higher     Note: Adopted from ADA consensus guidelines.         Non-palpable DP and PT pulses BL.   Equinus noted BL. Pes planus with rigid toe deformities noted to lesser digits on the right. Left AKA noted.   Nails are thickened, discolored, elongated, with subungual debris consistent with onychomycosis.          Assessment: DM2 with left AKA and neuropathy - presenting for a diabetic foot exam.   Onychomycosis.   Tyloma     Plan:   - Pt seen and evaluated  - Nails debrided x 5.  - Tyloma debrided x 1  - Cont compression socks.  - See again in 3 months.        Eleazar Pack, FRAN

## 2022-07-18 ENCOUNTER — MYC MEDICAL ADVICE (OUTPATIENT)
Dept: FAMILY MEDICINE | Facility: CLINIC | Age: 72
End: 2022-07-18

## 2022-07-25 DIAGNOSIS — I10 ESSENTIAL HYPERTENSION: ICD-10-CM

## 2022-07-26 RX ORDER — CARVEDILOL 25 MG/1
25 TABLET ORAL 2 TIMES DAILY WITH MEALS
Qty: 180 TABLET | Refills: 3 | Status: SHIPPED | OUTPATIENT
Start: 2022-07-26 | End: 2023-08-15

## 2022-08-02 NOTE — PROGRESS NOTES
"Supriya Herr  is being evaluated via a billable video visit.      How would you like to obtain your AVS? [employer]  For the video visit, send the invitation by: Text to cell phone: [phone]   Will anyone else be joining your video visit? No          Outcome for 08/02/22 3:47 PM: [employer] message sent  Jud Cabrera, VF  Outcome for 08/08/22 3:05 PM: Left Voicemail   Jud Cabrera, VF  Outcome for 08/09/22 1:21 PM: Left Voicemail   Karlene Estes, VF  Outcome for 08/10/22 12:18 PM: Data obtained via phone and located below  Celestemaria g Shaikh, VF     8/10 4:19am 73, 6:32am 97, 8:33am 73, 10:22am 87  8/09 12:54pm 91, 5:50pm 242, 7:42pm 250, 8:32pm 242  8/06 \"after dialysis\" 177  08/05 12pm 202          "

## 2022-08-04 DIAGNOSIS — E11.3213 TYPE 2 DIABETES MELLITUS WITH MILD NONPROLIFERATIVE RETINOPATHY OF BOTH EYES AND MACULAR EDEMA, UNSPECIFIED WHETHER LONG TERM INSULIN USE (H): Primary | ICD-10-CM

## 2022-08-10 ENCOUNTER — TELEPHONE (OUTPATIENT)
Dept: ENDOCRINOLOGY | Facility: CLINIC | Age: 72
End: 2022-08-10

## 2022-08-10 ENCOUNTER — VIRTUAL VISIT (OUTPATIENT)
Dept: ENDOCRINOLOGY | Facility: CLINIC | Age: 72
End: 2022-08-10
Payer: MEDICARE

## 2022-08-10 DIAGNOSIS — E11.65 TYPE 2 DIABETES MELLITUS WITH HYPERGLYCEMIA, WITH LONG-TERM CURRENT USE OF INSULIN (H): Primary | ICD-10-CM

## 2022-08-10 DIAGNOSIS — E11.21 TYPE 2 DIABETES MELLITUS WITH DIABETIC NEPHROPATHY, WITH LONG-TERM CURRENT USE OF INSULIN (H): ICD-10-CM

## 2022-08-10 DIAGNOSIS — Z79.4 TYPE 2 DIABETES MELLITUS WITH HYPERGLYCEMIA, WITH LONG-TERM CURRENT USE OF INSULIN (H): Primary | ICD-10-CM

## 2022-08-10 DIAGNOSIS — Z79.4 TYPE 2 DIABETES MELLITUS WITH DIABETIC NEPHROPATHY, WITH LONG-TERM CURRENT USE OF INSULIN (H): ICD-10-CM

## 2022-08-10 PROCEDURE — 99214 OFFICE O/P EST MOD 30 MIN: CPT | Mod: 95 | Performed by: PHYSICIAN ASSISTANT

## 2022-08-10 RX ORDER — BLOOD SUGAR DIAGNOSTIC
STRIP MISCELLANEOUS
Qty: 400 STRIP | Refills: 3 | Status: SHIPPED | OUTPATIENT
Start: 2022-08-10 | End: 2022-08-15

## 2022-08-10 NOTE — TELEPHONE ENCOUNTER
----- Message from Arabella Kamara PA-C sent at 8/10/2022 12:54 PM CDT -----  Hi:  Could you please send a labslip to pt's home to have an A1C done at hemodialysis.  Thanks rose

## 2022-08-10 NOTE — TELEPHONE ENCOUNTER
Lab slip mailed.   Suzanne Valentine, RN on 8/10/2022 at 1:12 PM     RE    Arabella Kamara PA-C Schwendeman, Connie M, RN; Suzanne Valentine RN  Hi:   Could you please send a labslip to pt's home to have an A1C done at hemodialysis.
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
## 2022-08-11 ENCOUNTER — TELEPHONE (OUTPATIENT)
Dept: ENDOCRINOLOGY | Facility: CLINIC | Age: 72
End: 2022-08-11

## 2022-08-11 NOTE — TELEPHONE ENCOUNTER
LVM, sent MyChart, and sent letter for pt to c/back to schedule 4 month f/up and labs per Arabella Kamara check-out orders.

## 2022-08-15 DIAGNOSIS — Z79.4 TYPE 2 DIABETES MELLITUS WITH DIABETIC NEPHROPATHY, WITH LONG-TERM CURRENT USE OF INSULIN (H): ICD-10-CM

## 2022-08-15 DIAGNOSIS — E11.21 TYPE 2 DIABETES MELLITUS WITH DIABETIC NEPHROPATHY, WITH LONG-TERM CURRENT USE OF INSULIN (H): ICD-10-CM

## 2022-08-15 RX ORDER — BLOOD SUGAR DIAGNOSTIC
STRIP MISCELLANEOUS
Qty: 400 STRIP | Refills: 3 | Status: SHIPPED | OUTPATIENT
Start: 2022-08-15 | End: 2022-08-16

## 2022-08-16 DIAGNOSIS — E11.21 TYPE 2 DIABETES MELLITUS WITH DIABETIC NEPHROPATHY, WITH LONG-TERM CURRENT USE OF INSULIN (H): ICD-10-CM

## 2022-08-16 DIAGNOSIS — Z79.4 TYPE 2 DIABETES MELLITUS WITH DIABETIC NEPHROPATHY, WITH LONG-TERM CURRENT USE OF INSULIN (H): Primary | ICD-10-CM

## 2022-08-16 DIAGNOSIS — Z79.4 TYPE 2 DIABETES MELLITUS WITH DIABETIC NEPHROPATHY, WITH LONG-TERM CURRENT USE OF INSULIN (H): ICD-10-CM

## 2022-08-16 DIAGNOSIS — E11.21 TYPE 2 DIABETES MELLITUS WITH DIABETIC NEPHROPATHY, WITH LONG-TERM CURRENT USE OF INSULIN (H): Primary | ICD-10-CM

## 2022-08-16 RX ORDER — BISMUTH SUBSALICYLATE 262MG/15ML
SUSPENSION, ORAL (FINAL DOSE FORM) ORAL
Qty: 300 EACH | Refills: 3 | Status: SHIPPED | OUTPATIENT
Start: 2022-08-16

## 2022-08-16 RX ORDER — BLOOD SUGAR DIAGNOSTIC
STRIP MISCELLANEOUS
Qty: 400 STRIP | Refills: 3 | OUTPATIENT
Start: 2022-08-16

## 2022-08-16 RX ORDER — BLOOD SUGAR DIAGNOSTIC
STRIP MISCELLANEOUS
Qty: 400 STRIP | Refills: 3 | Status: SHIPPED | OUTPATIENT
Start: 2022-08-16 | End: 2024-03-01

## 2022-08-16 RX ORDER — BISMUTH SUBSALICYLATE 262MG/15ML
SUSPENSION, ORAL (FINAL DOSE FORM) ORAL
Qty: 400 EACH | Refills: 3 | Status: SHIPPED | OUTPATIENT
Start: 2022-08-16 | End: 2022-08-16

## 2022-08-16 NOTE — TELEPHONE ENCOUNTER
ONE TOUCH ULTRA BLUE TEST STRP      Last Written Prescription Date:  8/15/22  Last Fill Quantity: 400,   # refills: 3  Last Office Visit : 8/10/22  Future Office visit:  None scheduled    Routing refill request to provider for review/approval because:  Per pharmacy note:  Pharmacy comment: Alternative Requested:MEDICARE REQUIRES SPECIFIC DIRECTIONS, NOT A RANGE OF TESTING FREQUENCY AND WILL ONLY COVER UP TO 3 PER DAY FOR INSULIN DEPENDENT PTS. PLEASE RESEND FOR 3 TESTS PER DAY. THANKS.

## 2022-08-17 ENCOUNTER — OFFICE VISIT (OUTPATIENT)
Dept: OPHTHALMOLOGY | Facility: CLINIC | Age: 72
End: 2022-08-17
Attending: OPHTHALMOLOGY
Payer: MEDICARE

## 2022-08-17 DIAGNOSIS — H10.9 BACTERIAL CONJUNCTIVITIS OF RIGHT EYE: Primary | ICD-10-CM

## 2022-08-17 DIAGNOSIS — E11.3213 TYPE 2 DIABETES MELLITUS WITH MILD NONPROLIFERATIVE RETINOPATHY OF BOTH EYES AND MACULAR EDEMA, UNSPECIFIED WHETHER LONG TERM INSULIN USE (H): ICD-10-CM

## 2022-08-17 DIAGNOSIS — H26.491 PCO (POSTERIOR CAPSULAR OPACIFICATION), RIGHT: ICD-10-CM

## 2022-08-17 PROCEDURE — 66821 AFTER CATARACT LASER SURGERY: CPT | Mod: RT | Performed by: OPHTHALMOLOGY

## 2022-08-17 PROCEDURE — 99214 OFFICE O/P EST MOD 30 MIN: CPT | Mod: 25 | Performed by: STUDENT IN AN ORGANIZED HEALTH CARE EDUCATION/TRAINING PROGRAM

## 2022-08-17 PROCEDURE — G0463 HOSPITAL OUTPT CLINIC VISIT: HCPCS | Mod: 25

## 2022-08-17 PROCEDURE — 92134 CPTRZ OPH DX IMG PST SGM RTA: CPT | Mod: 26 | Performed by: STUDENT IN AN ORGANIZED HEALTH CARE EDUCATION/TRAINING PROGRAM

## 2022-08-17 PROCEDURE — 92134 CPTRZ OPH DX IMG PST SGM RTA: CPT | Performed by: OPHTHALMOLOGY

## 2022-08-17 PROCEDURE — 250N000009 HC RX 250: Performed by: OPHTHALMOLOGY

## 2022-08-17 PROCEDURE — 66821 AFTER CATARACT LASER SURGERY: CPT | Mod: RT | Performed by: STUDENT IN AN ORGANIZED HEALTH CARE EDUCATION/TRAINING PROGRAM

## 2022-08-17 RX ORDER — BACITRACIN ZINC AND POLYMYXIN B SULFATE 500; 10000 [USP'U]/G; [USP'U]/G
0.5 OINTMENT OPHTHALMIC AT BEDTIME
Qty: 3.5 G | Refills: 1 | Status: SHIPPED | OUTPATIENT
Start: 2022-08-17 | End: 2022-08-31

## 2022-08-17 RX ADMIN — APRACLONIDINE HYDROCHLORIDE 1 DROP: 10 SOLUTION/ DROPS OPHTHALMIC at 16:24

## 2022-08-17 ASSESSMENT — VISUAL ACUITY
METHOD: SNELLEN - LINEAR
OS_CC: 20/25
OD_CC+: -1
OD_CC: 20/60
CORRECTION_TYPE: GLASSES
OS_CC+: -2

## 2022-08-17 ASSESSMENT — CUP TO DISC RATIO
OD_RATIO: 0.3
OS_RATIO: 0.3

## 2022-08-17 ASSESSMENT — SLIT LAMP EXAM - LIDS: COMMENTS: NORMAL

## 2022-08-17 ASSESSMENT — REFRACTION_WEARINGRX
OS_ADD: +2.50
OD_AXIS: 072
OS_SPHERE: -4.00
OD_SPHERE: -1.00
SPECS_TYPE: BIFOCAL
OD_CYLINDER: +1.00
OD_ADD: +2.50
OS_AXIS: 130
OS_CYLINDER: +1.75

## 2022-08-17 ASSESSMENT — CONF VISUAL FIELD
METHOD: COUNTING FINGERS
OS_NORMAL: 1
OD_NORMAL: 1

## 2022-08-17 ASSESSMENT — TONOMETRY
OD_IOP_MMHG: 10
OS_IOP_MMHG: 07
IOP_METHOD: TONOPEN

## 2022-08-17 NOTE — Clinical Note
Rosendo lopez, I recall that the peripheral area you mentioned I saw it before, this is why in 2019  there are several peripheral pictures taken over that area. Take a look. With peripheral lesions like this, some times we have to ask  to pull the lower lid to be able to see more periphery and less eyelashes.  I agree that looks like possible subretinal fluid with demarcation line and perhaps it is.  I guess I was assuming was peripheral scarring from prior surgery.  I have also seen continuous laser looking like demarcation line. I thought was present at least since I saw her first time. She could have had a prior Retinal detachment repair, I have not seen the surgical records though. Regardless, I think it is chronic and stable I edit your note Thanks alvin

## 2022-08-17 NOTE — NURSING NOTE
Chief Complaints and History of Present Illnesses   Patient presents with     Follow Up     Type 2 diabetes mellitus with mild nonproliferative retinopathy of both eyes and macular edema     Chief Complaint(s) and History of Present Illness(es)     Follow Up     Laterality: both eyes    Course: stable    Associated symptoms: tearing (right eye greater than left eye) and discharge (green goop more from right eye than left).  Negative for eye pain and redness    Treatments tried: artificial tears    Pain scale: 0/10    Comments: Type 2 diabetes mellitus with mild nonproliferative retinopathy of both eyes and macular edema              Comments     She states that her vision has seemed stable in both eyes since her last eye exam.     Lab Results       Component                Value               Date                       A1C                      6.2                 11/23/2021                 A1C                      6.2                 04/09/2021            TRINA Manuel 3:22 PM  August 17, 2022

## 2022-08-17 NOTE — PROGRESS NOTES
"CC: follow up Diabetic retinopathy     HPI: 72 year old female here for NPDR and DME.     Interim: Right eye tears a lot. Per patient \"She gets green gunk\" at random times. Sometimes it happens in the left eye. Vision is \"OK\"    Imaging:  Optical Coherence Tomography:08/17/22   right eye: central stable lamellar hole, nasal IRF, choroid normal, hyaloid   Left eye: irregular fovea, irregular inner retinal contour, mild dispersed pockets of IRF rashid nasally, MA    fluorescein angiography: 12/01/21    Right eye: blockage of fluorescein angiography on the areas of heme; few microaneurysms; mild late Diabetic macular edema and PP leakage  No neovascularization elsewhere; no neovascularization of the disc.  Left eye: few microaneurysms; mild late Diabetic macular edema; staining of the peripheral Chorioretinal  scars  Stable    Assessment & Plan:    # Moderate nonproliferative diabetic retinopathy both eyes   08/17/22 - stable.    # mild Diabetic macular edema both eyes  A1c 4/2021 6.2  08/17/22 - stable    # H/o right eye - retinal macroaneurysm    - at the sup temp margin of disc may contribute to macular edema;    - status post avastin inj x2 for cystoid macular edema with improvement   - No longer present- previously observed to be thrombosing   - Last DOROTEO 8/15/19 (#1)    #. Mod Hypertensive retinopathy   - Stage 5 kidney disease   - blood pressure control has been poor in 160s/90s-100s  - improved control per patient recently     # Pseudophakia both eyes  8-11-20 right eye   Patient happy with her vision   08/17/22 - VA has worsened OD, appears related to significant PCO. Will treat with YAG today.     #. History of Macula hole repair left eye by Dr. Daniel  S/p PPV, mp, air-fluid exchange, SF6 gas infusion, left eye 2/14/2012 by Dr. Daniel    - residual lamellar hole, but outer retina closed    08/17/22  Peripheral Chorioretinal  Scars with  IN area of shallow elevation anterior to the scars. questionable " shallow SRF with demarcation line, possible old laser and not extending posteriorly.   These are chronic findings and present in prior optos photos from 2019.   Will monitor.      # Dry eye syndrome   Status post punctal plugs   artificial tears  As needed and warm compresses     # Pt with some mattering of the right eye>left eye may be c/w chronic mild bacterial conjunctivitis   - no scurf   - floppy lid present   - no injection  08/17/22 - recommend tears, warm compresses, and will trial a 2 week course of topical bacitracin ointment QHS    PLAN:  - yag laser today 08/17/22   - follow up in one week post-yag  - Blood pressure (<120/80) and blood glucose (HbA1c <7.0) control discussed with patient.   - Patient advised that failure to adequately control each may lead to vision loss. The patient expressed understanding.- improved on exam today     - follow up in 4 months with Optical Coherence Tomography    Fernando Giang MD  V[facility]reoretinal Surgical Fellow, PGY6  [facility]      ~~~~~~~~~~~~~~~~~~~~~~~~~~~~~~~~~~   Complete documentation of historical and exam elements from today's encounter can be found in the full encounter summary report (not reduplicated in this progress note).  I personally obtained the chief complaint(s) and history of present illness.  I confirmed and edited as necessary the review of systems, past medical/surgical history, family history, social history, and examination findings as documented by others; and I examined the patient myself.  I personally reviewed the relevant tests, images, and reports as documented above.  I formulated and edited as necessary the assessment and plan and discussed the findings and management plan with the patient and family and I was present for critical portions of the procedure carried out by the resident/fellow and immediately available for the entire procedure    Leanne Jett MD   of Ophthalmology.  Retina Service    Department of Ophthalmology and Visual Neurosciences   [facility]  Phone: [phone]   Fax: [phone]

## 2022-08-22 ENCOUNTER — OFFICE VISIT (OUTPATIENT)
Dept: OPHTHALMOLOGY | Facility: CLINIC | Age: 72
End: 2022-08-22
Attending: OPHTHALMOLOGY
Payer: MEDICARE

## 2022-08-22 DIAGNOSIS — Z98.890 POST-OPERATIVE STATE: Primary | ICD-10-CM

## 2022-08-22 PROCEDURE — G0463 HOSPITAL OUTPT CLINIC VISIT: HCPCS | Mod: 25

## 2022-08-22 PROCEDURE — 99024 POSTOP FOLLOW-UP VISIT: CPT | Mod: GC | Performed by: STUDENT IN AN ORGANIZED HEALTH CARE EDUCATION/TRAINING PROGRAM

## 2022-08-22 PROCEDURE — 92015 DETERMINE REFRACTIVE STATE: CPT | Mod: GY

## 2022-08-22 ASSESSMENT — REFRACTION_WEARINGRX
SPECS_TYPE: BIFOCAL
OD_AXIS: 072
OD_SPHERE: -1.00
OD_ADD: +2.50
OS_AXIS: 130
OS_SPHERE: -4.00
OS_CYLINDER: +1.75
OD_CYLINDER: +1.00
OS_ADD: +2.50

## 2022-08-22 ASSESSMENT — VISUAL ACUITY
METHOD: SNELLEN - LINEAR
OD_CC+: -2
CORRECTION_TYPE: GLASSES
OD_CC: 20/25
OS_CC+: +2
OS_CC: 20/30

## 2022-08-22 ASSESSMENT — SLIT LAMP EXAM - LIDS
COMMENTS: NORMAL
COMMENTS: NORMAL

## 2022-08-22 ASSESSMENT — REFRACTION_MANIFEST
OD_ADD: +2.75
OD_AXIS: 072
OD_SPHERE: -0.50
OS_CYLINDER: +2.00
OD_CYLINDER: +1.00
OS_AXIS: 135
OS_ADD: +2.75
OS_SPHERE: -3.50

## 2022-08-22 ASSESSMENT — CUP TO DISC RATIO
OD_RATIO: 0.3
OS_RATIO: 0.3

## 2022-08-22 ASSESSMENT — TONOMETRY
OD_IOP_MMHG: 14
OS_IOP_MMHG: 09
IOP_METHOD: TONOPEN

## 2022-08-22 ASSESSMENT — CONF VISUAL FIELD
OS_NORMAL: 1
OD_NORMAL: 1

## 2022-08-22 ASSESSMENT — EXTERNAL EXAM - RIGHT EYE: OD_EXAM: NORMAL

## 2022-08-22 NOTE — PROGRESS NOTES
CC: follow up  yag laser right eye  08/17/22     HPI: 72 year old female with history of  NPDR and DME.     Interim: Right eye improved vision. Just filled the Rx for the bacitracin ointment and will start today. No new flashes after the procedure, but a few black dots were present after the initial procedure that are now resolved.     Imaging:  Optical Coherence Tomography:08/22/22  right eye: central stable lamellar hole, nasal IRF, choroid normal, hyaloid   Left eye: irregular fovea, irregular inner retinal contour, mild dispersed pockets of IRF rashid nasally, MA    fluorescein angiography: 12/01/21    Right eye: blockage of fluorescein angiography on the areas of heme; few microaneurysms; mild late Diabetic macular edema and PP leakage  No neovascularization elsewhere; no neovascularization of the disc.  Left eye: few microaneurysms; mild late Diabetic macular edema; staining of the peripheral Chorioretinal  scars  Stable    Assessment & Plan:    # Moderate nonproliferative diabetic retinopathy both eyes   08/17/22 - stable.    # mild Diabetic macular edema both eyes  A1c 4/2021 6.2  08/17/22 - stable    # H/o right eye - retinal macroaneurysm    - at the sup temp margin of disc may contribute to macular edema;    - status post avastin inj x2 for cystoid macular edema with improvement   - No longer present- previously observed to be thrombosing   - Last DOROTEO 8/15/19 (#1)    #. Mod Hypertensive retinopathy   - Stage 5 kidney disease   - blood pressure control has been poor in 160s/90s-100s  - improved control per patient recently     # Pseudophakia both eyes  8-11-20 right eye   8-17-22 YAG OD  08/17/22 - 1 week s/p YAG. Doing well.     #. History of Macula hole repair left eye by Dr. Daniel  S/p PPV, mp, air-fluid exchange, SF6 gas infusion, left eye 2/14/2012 by Dr. Daniel    - residual lamellar hole, but outer retina closed    08/17/22  Peripheral Chorioretinal  Scars with  IN area of shallow elevation  anterior to the scars. questionable shallow SRF with demarcation line, possible old laser and not extending posteriorly.   These are chronic findings and present in prior optos photos from 2019.   Will monitor.      # Dry eye syndrome   Status post punctal plugs   artificial tears  As needed and warm compresses     # Pt with some mattering of the right eye>left eye may be c/w chronic mild bacterial conjunctivitis   - no scurf   - floppy lid present   - no injection  08/17/22 - recommend tears, warm compresses, and will trial a 2 week course of topical bacitracin ointment QHS    PLAN:  - follow up in one week post-yag  - Blood pressure (<120/80) and blood glucose (HbA1c <7.0) control discussed with patient.   - Patient advised that failure to adequately control each may lead to vision loss. The patient expressed understanding.- improved on exam today     - follow up in 4 months with Optical Coherence Tomography and FA transit OD (oral administration)    Fernando Giang MD  Vitreoretinal Surgical Fellow, PGY6  [facility]      ~~~~~~~~~~~~~~~~~~~~~~~~~~~~~~~~~~   Complete documentation of historical and exam elements from today's encounter can be found in the full encounter summary report (not reduplicated in this progress note).  I personally obtained the chief complaint(s) and history of present illness.  I confirmed and edited as necessary the review of systems, past medical/surgical history, family history, social history, and examination findings as documented by others; and I examined the patient myself.  I personally reviewed the relevant tests, images, and reports as documented above.  I formulated and edited as necessary the assessment and plan and discussed the findings and management plan with the patient and family    Leanne Jett MD   of Ophthalmology.  Retina Service   Department of Ophthalmology and Visual Neurosciences   [facility]  Phone: [phone]   Fax: [phone]

## 2022-08-22 NOTE — NURSING NOTE
"Chief Complaints and History of Present Illnesses   Patient presents with     Post Op (Ophthalmology) Right Eye     Pt here for 1 week s/p Yag right eye.      Chief Complaint(s) and History of Present Illness(es)     Post Op (Ophthalmology) Right Eye     Associated symptoms: Negative for eye pain, headache, flashes and floaters    Comments: Pt here for 1 week s/p Yag right eye.               Comments     Pt notes improvement. Pt has improvement with \"gooping green stuff\"  Pt usin week course of topical bacitracin ointment at bedtime  LETICIA SANTOS 12:17 PM 2022                   "

## 2022-08-23 ENCOUNTER — TELEPHONE (OUTPATIENT)
Dept: ENDOCRINOLOGY | Facility: CLINIC | Age: 72
End: 2022-08-23

## 2022-08-23 NOTE — TELEPHONE ENCOUNTER
[facility] Call Center    Phone Message    May a detailed message be left on voicemail: yes     Reason for Call: Other:  from  Dialysis calling on be half of patient. Please reach out to patient daughter Caroline at [phone] t patient sensor keeps falling off and they need test strips to keep track of levels until that issue is resolved. .   Thank you .     Action Taken: Other: ENDO    Travel Screening: Not Applicable

## 2022-08-23 NOTE — TELEPHONE ENCOUNTER
A meter/ strips/ lancets were sent to [facility] 8/16/22 already  because of the sensor falling off. Her daughter or patient need to go pick them up . Kerri Jimenez RN on 8/23/2022 at 2:32 PM

## 2022-08-24 ENCOUNTER — TELEPHONE (OUTPATIENT)
Dept: TRANSPLANT | Facility: CLINIC | Age: 72
End: 2022-08-24

## 2022-08-24 NOTE — TELEPHONE ENCOUNTER
Called pts daughter to check in on items needing completion for transplant status. Needs mammogram and chest CT with PCP. Angiogram with cards, number to schedule is [phone]. After angiogram will get pt in for SW, CT a/p and iliac US. Also asking for update if pt has a coccyx ulcer. Left  with direct line for return call.

## 2022-08-29 ENCOUNTER — DOCUMENTATION ONLY (OUTPATIENT)
Dept: FAMILY MEDICINE | Facility: CLINIC | Age: 72
End: 2022-08-29

## 2022-08-29 DIAGNOSIS — G47.34 NOCTURNAL HYPOXEMIA: Primary | ICD-10-CM

## 2022-09-12 ENCOUNTER — MYC MEDICAL ADVICE (OUTPATIENT)
Dept: FAMILY MEDICINE | Facility: CLINIC | Age: 72
End: 2022-09-12

## 2022-09-19 ENCOUNTER — OFFICE VISIT (OUTPATIENT)
Dept: FAMILY MEDICINE | Facility: CLINIC | Age: 72
End: 2022-09-19
Payer: MEDICARE

## 2022-09-19 VITALS
SYSTOLIC BLOOD PRESSURE: 129 MMHG | OXYGEN SATURATION: 97 % | TEMPERATURE: 97.9 F | DIASTOLIC BLOOD PRESSURE: 54 MMHG | HEART RATE: 59 BPM

## 2022-09-19 DIAGNOSIS — M25.652 HIP JOINT STIFFNESS, LEFT: ICD-10-CM

## 2022-09-19 DIAGNOSIS — M62.81 MUSCLE WEAKNESS (GENERALIZED): ICD-10-CM

## 2022-09-19 DIAGNOSIS — N18.6 ESRD ON DIALYSIS (H): ICD-10-CM

## 2022-09-19 DIAGNOSIS — Z99.3 WHEELCHAIR DEPENDENT: ICD-10-CM

## 2022-09-19 DIAGNOSIS — S78.112S: Primary | ICD-10-CM

## 2022-09-19 DIAGNOSIS — E11.40 TYPE 2 DIABETES MELLITUS WITH DIABETIC NEUROPATHY, WITH LONG-TERM CURRENT USE OF INSULIN (H): ICD-10-CM

## 2022-09-19 DIAGNOSIS — Z79.4 TYPE 2 DIABETES MELLITUS WITH DIABETIC NEUROPATHY, WITH LONG-TERM CURRENT USE OF INSULIN (H): ICD-10-CM

## 2022-09-19 DIAGNOSIS — Z99.2 ESRD ON DIALYSIS (H): ICD-10-CM

## 2022-09-19 DIAGNOSIS — I73.9 PVD (PERIPHERAL VASCULAR DISEASE) (H): ICD-10-CM

## 2022-09-19 DIAGNOSIS — I50.22 CHRONIC SYSTOLIC CONGESTIVE HEART FAILURE (H): ICD-10-CM

## 2022-09-19 PROBLEM — J96.01 ACUTE RESPIRATORY FAILURE WITH HYPOXIA (H): Status: RESOLVED | Noted: 2021-11-23 | Resolved: 2022-09-19

## 2022-09-19 PROCEDURE — 0124A COVID-19,PF,PFIZER BOOSTER BIVALENT: CPT | Performed by: FAMILY MEDICINE

## 2022-09-19 PROCEDURE — 99214 OFFICE O/P EST MOD 30 MIN: CPT | Mod: 25 | Performed by: FAMILY MEDICINE

## 2022-09-19 PROCEDURE — 91312 COVID-19,PF,PFIZER BOOSTER BIVALENT: CPT | Performed by: FAMILY MEDICINE

## 2022-09-19 ASSESSMENT — PATIENT HEALTH QUESTIONNAIRE - PHQ9
10. IF YOU CHECKED OFF ANY PROBLEMS, HOW DIFFICULT HAVE THESE PROBLEMS MADE IT FOR YOU TO DO YOUR WORK, TAKE CARE OF THINGS AT HOME, OR GET ALONG WITH OTHER PEOPLE: NOT DIFFICULT AT ALL
SUM OF ALL RESPONSES TO PHQ QUESTIONS 1-9: 2
SUM OF ALL RESPONSES TO PHQ QUESTIONS 1-9: 2

## 2022-09-19 ASSESSMENT — PAIN SCALES - GENERAL: PAINLEVEL: NO PAIN (0)

## 2022-09-19 NOTE — LETTER
The patient has a mobility limitation that significantly impairs his/her ability to participate in one or more mobility-related activities of daily living (MRADLs).  The patient s mobility limitation cannot be sufficiently resolved by the use of an appropriately fitted cane or walker  The patient s home provides adequate access between rooms, maneuvering space and surfaces for the use of the manual wheelchair provided.  Use of a manual wheelchair will significantly improve the patient s ability to participate in MRADLs and the patient will use it on a regular basis in the home  The patient has not expressed an unwillingness to use the manual wheelchair that is provided in the home.   the patient has a caregiver who is available, willing and able to provide assistance with the wheelchair when the patient has limitations.

## 2022-09-19 NOTE — PROGRESS NOTES
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 Was found to be \" too tight ( in left hip/ pelvis) from sitting in wheelchair for years\"   so they advised her to go to specialized PHYSICAL THERAPY to improve this before attempted getting a new prosthetic leg    \" Also, my mom went to [facility] to get her prosthetic, but they said she needed to go to physical therapy because she is so bent before they can work on prosthetic.  Do we need a referral for that?  Here is where they want her to go - Dionne Lopez DPT - senior outpatient [place] [phone]   - referred by Jackie Owen            Need a referral - fax       - Physical Therapy Referral done, her daughter will first call to see they are in network            Wheelchair bound :The patient has a mobility limitation that significantly impairs his/her ability to participate in one or more mobility-related activities of daily living (MRADLs).  The patient s mobility limitation cannot be sufficiently resolved by the use of an appropriately fitted cane or walker  The patient s home provides adequate access between rooms, maneuvering space and surfaces for the use of the manual wheelchair provided.  Use of a manual wheelchair will significantly improve the patient s ability to participate in MRADLs and the patient will use it on a regular basis in the home  The patient has not expressed an unwillingness to use the manual wheelchair that is provided in the home.   the patient has a caregiver who is available, willing and able to provide assistance with the wheelchair when the patient has limitations.        Hip joint stiffness, left  As above  Follow up with consultant as planned.   - Physical Therapy Referral    Muscle weakness (generalized)  A above  - Physical " "Therapy Referral    ESRD on dialysis (H)  Follow up with consultant as planned.        Chronic systolic congestive heart failure (H)  Well controlled     PVD (peripheral vascular disease) (H)  Well controlled on medications   - Physical Therapy Referral    Type 2 diabetes mellitus with diabetic neuropathy, with long-term current use of insulin (H)  Lab Results   Component Value Date    A1C 6.2 11/23/2021    A1C 6.2 04/09/2021    A1C 6.0 06/22/2018    A1C 5.4 03/29/2018    A1C 6.8 01/09/2018    A1C 9.6 06/09/2017              Review of external notes as documented elsewhere in note  Review of the result(s) of each unique test - A1C  22 minutes spent on the date of the encounter doing review of outside records and review of test results        BMI:   Estimated body mass index is 33.57 kg/m  as calculated from the following:    Height as of 6/27/22: 1.676 m (5' 6\").    Weight as of 6/27/22: 94.3 kg (208 lb).     Blood sugar testing frequency justification:  renal failure  See Patient Instructions    No follow-ups on file.    oNam Jackson MD  [facility]    Jean Carlos Epps is a 72 year old accompanied by her daughter, presenting for the following health issues:  Referral and Covid Shot      History of Present Illness       Back Pain:  She presents for follow up of back pain. Patient's back pain is a recurring problem.  Location of back pain:  Right middle of back and left middle of back  Description of back pain: dull ache  Back pain spreads: nowhere    Since patient first noticed back pain, pain is: unchanged  Does back pain interfere with her job:  Not applicable      CKD: She uses over the counter pain medication, including Tylenol, a few times a month.    Diabetes:   She presents for follow up of diabetes.  She is checking home blood glucose three times daily. She checks blood glucose before meals and at bedtime.  Blood glucose is sometimes over 200 and sometimes under 70. When " her blood glucose is low, the patient is asymptomatic for confusion, blurred vision, lethargy and reports not feeling dizzy, shaky, or weak.  She has no concerns regarding her diabetes at this time.  She is not experiencing numbness or burning in feet, excessive thirst, blurry vision, weight changes or redness, sores or blisters on feet.         Heart Failure:  She presents for follow up of heart failure. She is experiencing shortness of breath with activity only, which is same as usual. She is experiencing lower extremity edema which is same as usual. She denies orthopenea and is not coughing at night. Patient is not checking weight daily. She has recently had a None. She has no side effects from medications.  She has had no other medical visits for heart failure since the last visit.    Hypertension: She presents for follow up of hypertension.  She does check blood pressure  regularly outside of the clinic. Outside blood pressures have been over 140/90. She does not follow a low salt diet.      Today's PHQ-9         PHQ-9 Total Score: 2    PHQ-9 Q9 Thoughts of better off dead/self-harm past 2 weeks :   Not at all    How difficult have these problems made it for you to do your work, take care of things at home, or get along with other people: Not difficult at all     Has a sore on the back of her left tight - has a spot on the same spot, possibly where the sore was healing. Would like to have it checked out to make sure it's not an open wound that could cause an infection      Would like referral for a Physical Therapist - Needs Physical Therapy before she can get a prosthetic and back on the kidney transplant list            Review of Systems   Constitutional, HEENT, cardiovascular, pulmonary, gi and gu systems are negative, except as otherwise noted.      Objective    /54   Pulse 59   Temp 97.9  F (36.6  C) (Temporal)   SpO2 97%   There is no height or weight on file to calculate BMI.  Physical Exam    GENERAL: alert, frail, elderly and fatigued  Wheelchair bound, needs assist with transport/ all transfers due to leg amputation and generalized weakness  NECK: no adenopathy, no asymmetry, masses, or scars and thyroid normal to palpation  RESP: lungs clear to auscultation - no rales, rhonchi or wheezes  CV: regular rates and rhythm, normal S1 S2, no S3 or S4 and no murmur, click or rub  ABDOMEN: soft, nontender, no hepatosplenomegaly, no masses and bowel sounds normal  MS: muscle wasting left upper thigh with contractures/ tightness of thigh muscles   NEURO: sensory exam grossly normal and mentation intact  PSYCH: mentation appears normal, affect normal/bright    Admission on 02/02/2022, Discharged on 02/02/2022   Component Date Value Ref Range Status     GLUCOSE BY METER POCT 02/02/2022 173 (A) 70 - 99 mg/dL Final     Potassium 02/02/2022 4.7  3.4 - 5.3 mmol/L Final

## 2022-10-04 PROBLEM — Z99.3 WHEELCHAIR DEPENDENCE: Status: ACTIVE | Noted: 2018-11-30

## 2022-10-07 ENCOUNTER — OFFICE VISIT (OUTPATIENT)
Dept: ORTHOPEDICS | Facility: CLINIC | Age: 72
End: 2022-10-07
Payer: MEDICARE

## 2022-10-07 DIAGNOSIS — Z89.612 S/P AKA (ABOVE KNEE AMPUTATION) UNILATERAL, LEFT (H): ICD-10-CM

## 2022-10-07 DIAGNOSIS — I73.89 OTHER SPECIFIED PERIPHERAL VASCULAR DISEASES (H): ICD-10-CM

## 2022-10-07 DIAGNOSIS — I73.9 PAD (PERIPHERAL ARTERY DISEASE) (H): ICD-10-CM

## 2022-10-07 DIAGNOSIS — L84 TYLOMA: Primary | ICD-10-CM

## 2022-10-07 DIAGNOSIS — B35.1 OM (ONYCHOMYCOSIS): ICD-10-CM

## 2022-10-07 PROCEDURE — 11720 DEBRIDE NAIL 1-5: CPT | Mod: Q7 | Performed by: PODIATRIST

## 2022-10-07 PROCEDURE — 99213 OFFICE O/P EST LOW 20 MIN: CPT | Mod: 25 | Performed by: PODIATRIST

## 2022-10-07 NOTE — PROGRESS NOTES
Chief Complaint   Patient presents with     Follow Up     3 month follow up.               Allergies   Allergen Reactions     Penicillins Rash     Unasyn Rash     No evidence SJS, but very uncomfortable and precipitated multiple provider visits. Would not use penicillins again if other options available.          Subjective: Supriya is a 72 year old female who presents to the clinic today for a diabetic foot exam and management.  Her nails do get long.  she has a history of amputation and from previous notes, we have documented that she has nonpalpable DP and PT pulses.     Objective    Hemoglobin A1C   Date Value Ref Range Status   11/23/2021 6.2 (H) 0.0 - 5.6 % Final     Comment:     Normal <5.7%   Prediabetes 5.7-6.4%    Diabetes 6.5% or higher     Note: Adopted from ADA consensus guidelines.   04/09/2021 6.2 (H) 0 - 5.6 % Final     Comment:     Normal <5.7% Prediabetes 5.7-6.4%  Diabetes 6.5% or higher - adopted from ADA   consensus guidelines.           Non-palpable DP and PT pulses L.   Equinus noted BL. Pes planus with rigid toe deformities noted to lesser digits on the right. Left AKA noted.   Nails are thickened, discolored, elongated, with subungual debris consistent with onychomycosis.          Assessment: DM2 with left AKA and neuropathy. She has severe vasculopathy.  Onychomycosis.   Tyloma     Plan:   - Pt seen and evaluated  - Nails debrided x 5.  - Tyloma debrided x 1  - Cont compression socks.  - See again in 3 months.

## 2022-10-07 NOTE — LETTER
10/7/2022         RE: Supriya Herr  [address]  [place] [address]        Dear Colleague,    Thank you for referring your patient, Supriya Herr, to the [facility] [place]. Please see a copy of my visit note below.    Chief Complaint   Patient presents with     Follow Up     3 month follow up.               Allergies   Allergen Reactions     Penicillins Rash     Unasyn Rash     No evidence SJS, but very uncomfortable and precipitated multiple provider visits. Would not use penicillins again if other options available.          Subjective: Supriya is a 72 year old female who presents to the clinic today for a diabetic foot exam and management.  Her nails do get long.  she has a history of amputation and from previous notes, we have documented that she has nonpalpable DP and PT pulses.     Objective    Hemoglobin A1C   Date Value Ref Range Status   11/23/2021 6.2 (H) 0.0 - 5.6 % Final     Comment:     Normal <5.7%   Prediabetes 5.7-6.4%    Diabetes 6.5% or higher     Note: Adopted from ADA consensus guidelines.   04/09/2021 6.2 (H) 0 - 5.6 % Final     Comment:     Normal <5.7% Prediabetes 5.7-6.4%  Diabetes 6.5% or higher - adopted from ADA   consensus guidelines.           Non-palpable DP and PT pulses L.   Equinus noted BL. Pes planus with rigid toe deformities noted to lesser digits on the right. Left AKA noted.   Nails are thickened, discolored, elongated, with subungual debris consistent with onychomycosis.          Assessment: DM2 with left AKA and neuropathy. She has severe vasculopathy.  Onychomycosis.   Tyloma     Plan:   - Pt seen and evaluated  - Nails debrided x 5.  - Tyloma debrided x 1  - Cont compression socks.  - See again in 3 months.        Eleazar Pack, FRAN

## 2022-10-14 ENCOUNTER — MYC MEDICAL ADVICE (OUTPATIENT)
Dept: FAMILY MEDICINE | Facility: CLINIC | Age: 72
End: 2022-10-14

## 2022-10-26 ENCOUNTER — HOSPITAL ENCOUNTER (OUTPATIENT)
Dept: MAMMOGRAPHY | Facility: CLINIC | Age: 72
Discharge: HOME | End: 2022-10-26
Attending: FAMILY MEDICINE | Admitting: FAMILY MEDICINE
Payer: MEDICARE

## 2022-10-26 DIAGNOSIS — Z12.31 VISIT FOR SCREENING MAMMOGRAM: ICD-10-CM

## 2022-10-26 PROCEDURE — 77067 SCR MAMMO BI INCL CAD: CPT

## 2022-11-04 ENCOUNTER — TELEPHONE (OUTPATIENT)
Dept: ENDOCRINOLOGY | Facility: CLINIC | Age: 72
End: 2022-11-04

## 2022-11-04 DIAGNOSIS — E11.40 TYPE 2 DIABETES MELLITUS WITH DIABETIC NEUROPATHY, WITH LONG-TERM CURRENT USE OF INSULIN (H): Primary | ICD-10-CM

## 2022-11-04 DIAGNOSIS — Z79.4 TYPE 2 DIABETES MELLITUS WITH DIABETIC NEUROPATHY, WITH LONG-TERM CURRENT USE OF INSULIN (H): Primary | ICD-10-CM

## 2022-11-04 NOTE — TELEPHONE ENCOUNTER
"[facility] Call Center    Phone Message    May a detailed message be left on voicemail: yes     Reason for Call: Patient states that \"the little grey thing that makes her sensor work is missing\" Patient states that she has a Avery 2 sensor. Patient wondering if MD can put in a new order.       Action Taken: Other: Endo    Travel Screening: Not Applicable                                                                      "

## 2022-11-04 NOTE — TELEPHONE ENCOUNTER
A new reader for freestyle sara 2 is needed a piece  is missing. New order sent Kerri Jimenez RN on 11/4/2022 at 1:39 PM

## 2022-11-11 ENCOUNTER — TELEPHONE (OUTPATIENT)
Dept: FAMILY MEDICINE | Facility: CLINIC | Age: 72
End: 2022-11-11

## 2022-11-11 DIAGNOSIS — S78.119S: Primary | ICD-10-CM

## 2022-11-11 NOTE — TELEPHONE ENCOUNTER
Daughter called stating that she received a call back from a referral for a foot infection. Daughter is requesting a PT appointment. She stated that her mother has not had a foot infection in years.    I have TD up a PT referral     Dionne Lei RN  [facility]

## 2022-11-11 NOTE — TELEPHONE ENCOUNTER
We can also try PHYSICAL THERAPY first but with this kind of specialzed need, I in the past, have had them first see physiatry to get PMR's help    Encounter Diagnosis   Name Primary?     Traumatic above-knee amputation, unspecified laterality, sequela (H) Yes      Orders Placed This Encounter     Physical Therapy Referral     Standing Status:   Future     Standing Expiration Date:   11/11/2023     Referral Priority:   Routine: Next available opening     Referral Type:   Rehab Therapy Physical Therapy     Number of Visits Requested:   1

## 2022-11-11 NOTE — TELEPHONE ENCOUNTER
Called and LMOM to call back for message from PCP below.  See mychart from 11/9 as well.    Kassy VELEZ RN

## 2022-11-16 ENCOUNTER — OFFICE VISIT (OUTPATIENT)
Dept: PHYSICAL MEDICINE AND REHAB | Facility: CLINIC | Age: 72
End: 2022-11-16
Attending: FAMILY MEDICINE
Payer: MEDICARE

## 2022-11-16 VITALS — SYSTOLIC BLOOD PRESSURE: 132 MMHG | OXYGEN SATURATION: 97 % | DIASTOLIC BLOOD PRESSURE: 53 MMHG | HEART RATE: 62 BPM

## 2022-11-16 DIAGNOSIS — L08.9 DIABETIC FOOT INFECTION (H): ICD-10-CM

## 2022-11-16 DIAGNOSIS — E11.628 DIABETIC FOOT INFECTION (H): ICD-10-CM

## 2022-11-16 DIAGNOSIS — Z99.3 WHEELCHAIR DEPENDENCE: ICD-10-CM

## 2022-11-16 DIAGNOSIS — S78.112A ABOVE KNEE AMPUTATION OF LEFT LOWER EXTREMITY (H): Primary | ICD-10-CM

## 2022-11-16 PROCEDURE — 99205 OFFICE O/P NEW HI 60 MIN: CPT | Performed by: PHYSICAL MEDICINE & REHABILITATION
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
## 2022-11-16 NOTE — NURSING NOTE
Chief Complaint   Patient presents with     Consult     New, possible PT - wanting a prosthetic      /53   Pulse 62   SpO2 97%       Faina Jane, EMT
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
## 2022-11-18 ENCOUNTER — TELEPHONE (OUTPATIENT)
Dept: FAMILY MEDICINE | Facility: CLINIC | Age: 72
End: 2022-11-18

## 2022-11-18 ENCOUNTER — TELEPHONE (OUTPATIENT)
Dept: TRANSPLANT | Facility: CLINIC | Age: 72
End: 2022-11-18

## 2022-11-18 DIAGNOSIS — Z12.2 SCREENING FOR LUNG CANCER: ICD-10-CM

## 2022-11-18 DIAGNOSIS — Z87.891 PERSONAL HISTORY OF NICOTINE DEPENDENCE: ICD-10-CM

## 2022-11-18 DIAGNOSIS — Z12.31 ENCOUNTER FOR SCREENING MAMMOGRAM FOR BREAST CANCER: Primary | ICD-10-CM

## 2022-11-18 NOTE — TELEPHONE ENCOUNTER
----- Message from Miguelina Villegas RN sent at 11/18/2022 11:19 AM CST -----  Regarding: items needed for transplant  Hi Dr. Jackson,     I am the kidney coordinator working with Supriya.     I wanted to touch base with you regarding the items needed for transplant clearance. I see you had a visit with her back in September and she is working on getting a new prothesis and working with PT which is great!     Some items we will still need are Mammogram updated, appears she is due for a colonoscopy, and a chest CT (for cancer screening). Wondering if you could help arrange these with her?     We will bring her back in for updated visits with our team once she is done working with PT. At that time we will update her CT a/p and have her complete an angiogram with cardiology as well.     Thanks so much!   Miguelina

## 2022-11-18 NOTE — TELEPHONE ENCOUNTER
Called pts daughter to check in on how things are going and update on items needed to be completed with PCP. Left VM with direct line for return call.     [employer] message sent to PCP with items needed.

## 2022-11-21 ENCOUNTER — MEDICAL CORRESPONDENCE (OUTPATIENT)
Dept: FAMILY MEDICINE | Facility: CLINIC | Age: 72
End: 2022-11-21

## 2022-11-25 ENCOUNTER — MEDICAL CORRESPONDENCE (OUTPATIENT)
Dept: HEALTH INFORMATION MANAGEMENT | Facility: CLINIC | Age: 72
End: 2022-11-25

## 2022-11-25 ENCOUNTER — TELEPHONE (OUTPATIENT)
Dept: TRANSPLANT | Facility: CLINIC | Age: 72
End: 2022-11-25

## 2022-11-25 NOTE — TELEPHONE ENCOUNTER
Returned pts daughters call to review items needed for active status on the transplant list. Need to review if colonoscopy is needed at committee. Reviewed pt will need to continue to work with PT and prothesis. Needs mammogram and CT chest. Once done with PT, will bring back in for surgeon visit and CT a/p and iliacs. Left  with direct line for return call.

## 2022-11-30 ENCOUNTER — COMMITTEE REVIEW (OUTPATIENT)
Dept: TRANSPLANT | Facility: CLINIC | Age: 72
End: 2022-11-30

## 2022-11-30 ENCOUNTER — TELEPHONE (OUTPATIENT)
Dept: TRANSPLANT | Facility: CLINIC | Age: 72
End: 2022-11-30

## 2022-11-30 NOTE — TELEPHONE ENCOUNTER
Called pts daughter to update that colonoscopy is required, it will be due 6/2023. If pt does not want to proceed with colonoscopy we can have her see GI to discuss further. Left VM with direct line for return call.     11/30/22 addendum: Received a call back from matt werner. Pt is starting PT next week. Will be a long road. Reviewed angiogram and colonoscopy will be needed before active status but can wait until done with PT and has surgeon clearance. Will check in around MArch/ April. Pt's beckyaghter verbalized understanding of information and has no further questions. Encouraged to reach out if questions arise.

## 2022-11-30 NOTE — TELEPHONE ENCOUNTER
"[facility] IS REQUESTING:     QTY: 60  BRIEFS BARIATRIC \" PREVAIL TABBED WAIST 3XL     FAX: [phone]  "
Dr. Jackson,    Ok for DME? Cued.     Thanks,  SHANTA Krause  [facility]     
Ok DME   please print    
Order faxed to [facility]  
What Type Of Note Output Would You Prefer (Optional)?: Bullet Format
Hpi Title: Evaluation of Skin Lesions

## 2022-11-30 NOTE — COMMITTEE REVIEW
Abdominal Committee Review Note     Evaluation Date: 3/29/2018  Committee Review Date: 11/30/2022    Organ being evaluated for: Kidney    Transplant Phase: Waitlist  Transplant Status: Inactive    Transplant Coordinator: Miguelina Villegas  Transplant Surgeon:       Referring Physician: Angelita Martin    Primary Diagnosis: Diabetes Mellitus - Type II  Secondary Diagnosis:     Committee Review Members:  Nephrology Romulo Manuel MD, Aly Harrington, APRN CNP, Barbara Reina MD, Dixon Montalvo MD   Nurse Shelia Charles, SHANTA   Nutrition Kelly Dillard,    Pharmacy Faina Knutson [facility]    - Clinical Dipikafranc [place], [facility], Miguelina Barrios, [facility]   Transplant Siomara Cifuentes RN, Shelia Charles, SHANTA, PENELOPE Brian CNP, Gianna Sorto, SHANTA, Miguelina Villegas, RN, Alina Alvarado, RN, Jose G Serrato, RN, Deena Tran RN, Angelita Martin MD, Kamryn Zamora RN   Transplant Surgery Angelita Martin MD       Transplant Eligibility: Irreversible chronic kidney disease treated w/dialysis or expected need for dialysis    Committee Review Decision: Remain Inactive    Relative Contraindications:     Absolute Contraindications:     Committee Chair Angelita Martin MD verbally attested to the committee's decision.    Committee Discussion Details:     Reviewed the following:      -ESKD from presumed DM/HTN on HD since 6/2020     -Reached out to her PCP to help arrange colonoscopy. Dr. Jackson recommending they do not recommend colonoscopy after age 72 due to risk of perforation.      -Last biopsy showed Tubular Adenoma and hyperplastic polyp from 6/2018.     Committee determined that pt will need to complete colonoscopy when she is due. If she does not want to proceed with colonoscopy then pt can see GI.

## 2022-12-05 ENCOUNTER — TELEPHONE (OUTPATIENT)
Dept: FAMILY MEDICINE | Facility: CLINIC | Age: 72
End: 2022-12-05

## 2022-12-06 DIAGNOSIS — E11.3213 TYPE 2 DIABETES MELLITUS WITH MILD NONPROLIFERATIVE RETINOPATHY OF BOTH EYES AND MACULAR EDEMA, UNSPECIFIED WHETHER LONG TERM INSULIN USE (H): Primary | ICD-10-CM

## 2022-12-09 ENCOUNTER — HOSPITAL ENCOUNTER (OUTPATIENT)
Dept: PHYSICAL THERAPY | Facility: CLINIC | Age: 72
Setting detail: THERAPIES SERIES
Discharge: HOME | End: 2022-12-09
Attending: PHYSICAL MEDICINE & REHABILITATION
Payer: MEDICARE

## 2022-12-09 DIAGNOSIS — Z99.3 WHEELCHAIR DEPENDENCE: ICD-10-CM

## 2022-12-09 DIAGNOSIS — S78.112A ABOVE KNEE AMPUTATION OF LEFT LOWER EXTREMITY (H): ICD-10-CM

## 2022-12-09 PROCEDURE — 97110 THERAPEUTIC EXERCISES: CPT | Mod: GP

## 2022-12-09 PROCEDURE — 97162 PT EVAL MOD COMPLEX 30 MIN: CPT | Mod: GP
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
## 2022-12-10 NOTE — PROGRESS NOTES
Outpatient Physical Therapy Evaluation  PLAN OF TREATMENT FOR OUTPATIENT REHABILITATION  (COMPLETE FOR INITIAL CLAIMS ONLY)  Patient's Last Name, First Name, M.I.  YOB: 1950  Supriya Herr                        Provider's Name  Virginia Zelaya, PT Medical Record No.  [id number]                               Onset Date:  9/11/1989   Start of Care Date: 12/9/22     Type: Physical Therapy Medical Diagnosis: Above knee amputation of left lower extremity; Wheelchair User                        Therapy Diagnosis:  Impaired mobility   Visits from SOC:  1   _________________________________________________________________________________  Plan of Treatment:      Frequency/Duration: 2x/week up to 13 weeks, decreasing frequency prn    Goals:   Goal 1   Goal Identifier Standing tolerance   Goal Description Pt will be able to tolerate standing >5 min in parallel bars w/ single UE support in order to build weightbearing tolerance of R LE   Goal Progress eval: able to  // bars with heavy B UE support x30 sec   Target Date 03/08/23   Goal 2   Goal Identifier Transfers   Goal Description Pt will demonstrate proper mechanics of stand pivot transfer with walker from chair<>mat in order to improve safety with transfers at home   Goal Progress eval: see transfers in eval for description   Target Date 03/08/23   Goal 3   Goal Identifier HEP   Goal Description Pt will be inependent with HEP at least 4x/week in order to improve self management of symptoms   Target Date 03/08/23     _________________________________________________________________________________    I CERTIFY THE NEED FOR THESE SERVICES FURNISHED UNDER        THIS PLAN OF TREATMENT AND WHILE UNDER MY CARE     (Physician co-signature of this document indicates review and certification of the therapy plan).                Certification date from:  12/9/22  Certification date to: 3/8/23    Referring Provider: SHIELA WRIGHT

## 2022-12-14 ENCOUNTER — OFFICE VISIT (OUTPATIENT)
Dept: OPHTHALMOLOGY | Facility: CLINIC | Age: 72
End: 2022-12-14
Attending: OPHTHALMOLOGY
Payer: MEDICARE

## 2022-12-14 DIAGNOSIS — E11.3213 TYPE 2 DIABETES MELLITUS WITH MILD NONPROLIFERATIVE RETINOPATHY OF BOTH EYES AND MACULAR EDEMA, UNSPECIFIED WHETHER LONG TERM INSULIN USE (H): ICD-10-CM

## 2022-12-14 PROCEDURE — 99213 OFFICE O/P EST LOW 20 MIN: CPT | Performed by: OPHTHALMOLOGY

## 2022-12-14 PROCEDURE — 92235 FLUORESCEIN ANGRPH MLTIFRAME: CPT | Performed by: OPHTHALMOLOGY

## 2022-12-14 PROCEDURE — 92134 CPTRZ OPH DX IMG PST SGM RTA: CPT | Performed by: OPHTHALMOLOGY

## 2022-12-14 PROCEDURE — G0463 HOSPITAL OUTPT CLINIC VISIT: HCPCS | Mod: 25

## 2022-12-14 ASSESSMENT — CONF VISUAL FIELD
OD_SUPERIOR_NASAL_RESTRICTION: 0
OD_SUPERIOR_TEMPORAL_RESTRICTION: 0
OS_SUPERIOR_NASAL_RESTRICTION: 0
OD_INFERIOR_NASAL_RESTRICTION: 0
OS_INFERIOR_NASAL_RESTRICTION: 0
OS_NORMAL: 1
OS_SUPERIOR_TEMPORAL_RESTRICTION: 0
METHOD: COUNTING FINGERS
OD_NORMAL: 1
OS_INFERIOR_TEMPORAL_RESTRICTION: 0
OD_INFERIOR_TEMPORAL_RESTRICTION: 0

## 2022-12-14 ASSESSMENT — VISUAL ACUITY
OS_CC+: -1
OD_PH_CC: 20/30
OD_CC: 20/40
OD_PH_CC+: -1
METHOD: SNELLEN - LINEAR
OD_CC+: -1+1
OS_CC: 20/25

## 2022-12-14 ASSESSMENT — SLIT LAMP EXAM - LIDS
COMMENTS: NORMAL
COMMENTS: NORMAL

## 2022-12-14 ASSESSMENT — CUP TO DISC RATIO
OD_RATIO: 0.3
OS_RATIO: 0.3

## 2022-12-14 ASSESSMENT — EXTERNAL EXAM - RIGHT EYE: OD_EXAM: NORMAL

## 2022-12-14 ASSESSMENT — TONOMETRY
IOP_METHOD: TONOPEN
OD_IOP_MMHG: 13
OS_IOP_MMHG: 13

## 2022-12-14 NOTE — NURSING NOTE
Chief Complaints and History of Present Illnesses   Patient presents with     Follow Up     Moderate nonproliferative diabetic retinopathy both eyes      Chief Complaint(s) and History of Present Illness(es)     Follow Up            Comments: Moderate nonproliferative diabetic retinopathy both eyes           Comments    Pt states no change in VA since last visit  C/o of some tearing right eye and some light sensitivity both eyes  Flaoters same as last visit  No flashes or eye pain  LBS:  130   Last A1C: ?  Lab Results       Component                Value               Date                       A1C                      6.2                 11/23/2021                 A1C                      6.2                 04/09/2021                 A1C                      6.0                 06/22/2018                 A1C                      5.4                 03/29/2018                 A1C                      6.8                 01/09/2018                 A1C                      9.6                 06/09/2017        Deisy Pérez COT 2:09 PM December 14, 2022

## 2022-12-14 NOTE — PROGRESS NOTES
CC: follow up   HPI: 72 year old female with history of  NPDR and DME.     Interim: Right eye improved vision. Just filled the Rx for the bacitracin ointment and will start today. No new flashes after the procedure, but a few black dots were present after the initial procedure that are now resolved.     Imaging:  Optical Coherence Tomography: 12/14/22   right eye: central stable lamellar hole. No IRF, choroid normal, hyaloid . stable  Left eye: irregular fovea, irregular inner retinal contour, mild dispersed pockets of IRF rashid nasally, microaneurysms. stable    fluorescein angiography: 12/14/22   Right eye: blockage of fluorescein angiography on the areas of heme; few microaneurysms; mild late Diabetic macular edema and PP leakage  Left eye: few microaneurysms; mild late Diabetic macular edema; staining of the peripheral Chorioretinal  scars  Stable  No neovascularization elsewhere; no neovascularization of the disc.    Assessment & Plan:    # Moderate nonproliferative diabetic retinopathy both eyes   08/17/22 - stable.    # mild Diabetic macular edema both eyes  A1c 4/2021 6.2  08/17/22 - stable    # H/o right eye - retinal macroaneurysm    - at the sup temp margin of disc may contribute to macular edema;    - status post avastin inj x2 for cystoid macular edema with improvement   - No longer present- previously observed to be thrombosing   - Last DOROTEO 8/15/19 (#1)    #. Mod Hypertensive retinopathy   - Stage 5 kidney disease   - blood pressure control has been poor in 160s/90s-100s  - improved control per patient recently     # Pseudophakia both eyes  8-11-20 right eye   8-17-22 YAG OD    #. History of Macula hole repair left eye by Dr. Daniel  S/p PPV, mp, air-fluid exchange, SF6 gas infusion, left eye 2/14/2012 by Dr. Daniel    - residual lamellar hole, but outer retina closed  08/17/22  Peripheral Chorioretinal  Scars with  IN area of shallow elevation anterior to the scars. questionable shallow SRF  with demarcation line, possible old laser and not extending posteriorly.   These are chronic findings and present in prior optos photos from 2019.   Will monitor.      # Dry eye syndrome   Status post punctal plugs   artificial tears  As needed and warm compresses     # history of mild bacterial conjunctivitis  last eye examination Pt with some mattering of the right eye>left eye may be c/w chronic mild bacterial conjunctivitis   - no scurf   - floppy lid present   - no injection  08/17/22 - recommend tears, warm compresses  - status post 2 week course of topical bacitracin ointment at bedtime with improvement of surgery     PLAN:   - Blood pressure (<120/80) and blood glucose (HbA1c <7.0) control discussed with patient.   - Patient advised that failure to adequately control each may lead to vision loss. The patient expressed understanding.- improved on exam today   - follow up in 4 months with Optical Coherence Tomography     ~~~~~~~~~~~~~~~~~~~~~~~~~~~~~~~~~~   Complete documentation of historical and exam elements from today's encounter can be found in the full encounter summary report (not reduplicated in this progress note).  I personally obtained the chief complaint(s) and history of present illness.  I confirmed and edited as necessary the review of systems, past medical/surgical history, family history, social history, and examination findings as documented by others; and I examined the patient myself.  I personally reviewed the relevant tests, images, and reports as documented above.  I formulated and edited as necessary the assessment and plan and discussed the findings and management plan with the patient and family    Leanne Jett MD   of Ophthalmology.  Retina Service   Department of Ophthalmology and Visual Neurosciences   [facility]  Phone: [phone]   Fax: [phone]
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
## 2023-01-06 ENCOUNTER — HOSPITAL ENCOUNTER (OUTPATIENT)
Dept: PHYSICAL THERAPY | Facility: CLINIC | Age: 73
Setting detail: THERAPIES SERIES
Discharge: HOME | End: 2023-01-06
Attending: PHYSICAL MEDICINE & REHABILITATION
Payer: MEDICARE

## 2023-01-06 PROCEDURE — 97110 THERAPEUTIC EXERCISES: CPT | Mod: GP | Performed by: PHYSICAL THERAPIST

## 2023-01-06 PROCEDURE — 97530 THERAPEUTIC ACTIVITIES: CPT | Mod: GP | Performed by: PHYSICAL THERAPIST

## 2023-01-11 ENCOUNTER — HOSPITAL ENCOUNTER (OUTPATIENT)
Dept: PHYSICAL THERAPY | Facility: CLINIC | Age: 73
Setting detail: THERAPIES SERIES
Discharge: HOME | End: 2023-01-11
Attending: PHYSICAL MEDICINE & REHABILITATION
Payer: MEDICARE

## 2023-01-11 PROCEDURE — 97110 THERAPEUTIC EXERCISES: CPT | Mod: GP | Performed by: PHYSICAL THERAPIST

## 2023-01-11 NOTE — DISCHARGE INSTRUCTIONS
Make an appt with Dr Jackson - you will need updated information in order to get a prosthetic.  - [facility].  CHECK YOUR INSURANCE.

## 2023-01-18 ENCOUNTER — HOSPITAL ENCOUNTER (OUTPATIENT)
Dept: PHYSICAL THERAPY | Facility: CLINIC | Age: 73
Setting detail: THERAPIES SERIES
Discharge: HOME | End: 2023-01-18
Attending: PHYSICAL MEDICINE & REHABILITATION
Payer: MEDICARE

## 2023-01-18 ENCOUNTER — VIRTUAL VISIT (OUTPATIENT)
Dept: PHYSICAL MEDICINE AND REHAB | Facility: CLINIC | Age: 73
End: 2023-01-18
Payer: MEDICARE

## 2023-01-18 DIAGNOSIS — S78.112A ABOVE KNEE AMPUTATION OF LEFT LOWER EXTREMITY (H): Primary | ICD-10-CM

## 2023-01-18 PROCEDURE — 99215 OFFICE O/P EST HI 40 MIN: CPT | Mod: 95 | Performed by: PHYSICAL MEDICINE & REHABILITATION

## 2023-01-18 PROCEDURE — 97110 THERAPEUTIC EXERCISES: CPT | Mod: GP | Performed by: PHYSICAL THERAPIST

## 2023-01-18 NOTE — NURSING NOTE
Supriya is a 73 year old who is being evaluated via a billable video visit.      How would you like to obtain your AVS? MyChart  If the video visit is dropped, the invitation should be resent by: Text to cell phone: [phone]  Will anyone else be joining your video visit? No        Video-Visit Details    Type of service:  Video Visit   Video Start Time: 3:30  Video End Time:4:15    Originating Location (pt. Location): Home    Distant Location (provider location):  On-site  Platform used for Video Visit: Marisa Jane, EMT
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
## 2023-01-20 ENCOUNTER — HOSPITAL ENCOUNTER (OUTPATIENT)
Dept: PHYSICAL THERAPY | Facility: CLINIC | Age: 73
Setting detail: THERAPIES SERIES
Discharge: HOME | End: 2023-01-20
Attending: PHYSICAL MEDICINE & REHABILITATION
Payer: MEDICARE

## 2023-01-20 PROCEDURE — 97110 THERAPEUTIC EXERCISES: CPT | Mod: GP | Performed by: PHYSICAL THERAPIST

## 2023-01-25 ENCOUNTER — HOSPITAL ENCOUNTER (OUTPATIENT)
Dept: PHYSICAL THERAPY | Facility: CLINIC | Age: 73
Setting detail: THERAPIES SERIES
Discharge: HOME | End: 2023-01-25
Attending: PHYSICAL MEDICINE & REHABILITATION
Payer: MEDICARE

## 2023-01-25 PROCEDURE — 97110 THERAPEUTIC EXERCISES: CPT | Mod: GP | Performed by: PHYSICAL THERAPIST

## 2023-01-27 ENCOUNTER — HOSPITAL ENCOUNTER (OUTPATIENT)
Dept: PHYSICAL THERAPY | Facility: CLINIC | Age: 73
Setting detail: THERAPIES SERIES
Discharge: HOME | End: 2023-01-27
Attending: PHYSICAL MEDICINE & REHABILITATION
Payer: MEDICARE

## 2023-01-27 PROCEDURE — 97110 THERAPEUTIC EXERCISES: CPT | Mod: GP | Performed by: PHYSICAL THERAPIST

## 2023-01-27 PROCEDURE — 97530 THERAPEUTIC ACTIVITIES: CPT | Mod: GP | Performed by: PHYSICAL THERAPIST

## 2023-01-31 ENCOUNTER — HOSPITAL ENCOUNTER (EMERGENCY)
Facility: CLINIC | Age: 73
Discharge: HOME | End: 2023-01-31
Attending: EMERGENCY MEDICINE | Admitting: EMERGENCY MEDICINE
Payer: MEDICARE

## 2023-01-31 ENCOUNTER — TELEPHONE (OUTPATIENT)
Dept: FAMILY MEDICINE | Facility: CLINIC | Age: 73
End: 2023-01-31

## 2023-01-31 VITALS
DIASTOLIC BLOOD PRESSURE: 43 MMHG | SYSTOLIC BLOOD PRESSURE: 103 MMHG | WEIGHT: 208 LBS | HEART RATE: 68 BPM | RESPIRATION RATE: 16 BRPM | BODY MASS INDEX: 36.86 KG/M2 | HEIGHT: 63 IN | OXYGEN SATURATION: 98 % | TEMPERATURE: 98.1 F

## 2023-01-31 DIAGNOSIS — R55 SYNCOPE, UNSPECIFIED SYNCOPE TYPE: ICD-10-CM

## 2023-01-31 DIAGNOSIS — Z86.73 HISTORY OF TIA (TRANSIENT ISCHEMIC ATTACK) AND STROKE: Primary | ICD-10-CM

## 2023-01-31 DIAGNOSIS — Z87.448 HISTORY OF END STAGE RENAL DISEASE: ICD-10-CM

## 2023-01-31 LAB
ANION GAP SERPL CALCULATED.3IONS-SCNC: 12 MMOL/L (ref 7–15)
ATRIAL RATE - MUSE: 63 BPM
BASOPHILS # BLD AUTO: 0 10E3/UL (ref 0–0.2)
BASOPHILS NFR BLD AUTO: 0 %
BUN SERPL-MCNC: 21.6 MG/DL (ref 8–23)
CALCIUM SERPL-MCNC: 9.4 MG/DL (ref 8.8–10.2)
CHLORIDE SERPL-SCNC: 98 MMOL/L (ref 98–107)
CREAT SERPL-MCNC: 2.87 MG/DL (ref 0.51–0.95)
DEPRECATED HCO3 PLAS-SCNC: 25 MMOL/L (ref 22–29)
DIASTOLIC BLOOD PRESSURE - MUSE: NORMAL MMHG
EOSINOPHIL # BLD AUTO: 0.1 10E3/UL (ref 0–0.7)
EOSINOPHIL NFR BLD AUTO: 1 %
ERYTHROCYTE [DISTWIDTH] IN BLOOD BY AUTOMATED COUNT: 12.1 % (ref 10–15)
GFR SERPL CREATININE-BSD FRML MDRD: 17 ML/MIN/1.73M2
GLUCOSE SERPL-MCNC: 194 MG/DL (ref 70–99)
HCT VFR BLD AUTO: 29.7 % (ref 35–47)
HGB BLD-MCNC: 9.7 G/DL (ref 11.7–15.7)
IMM GRANULOCYTES # BLD: 0 10E3/UL
IMM GRANULOCYTES NFR BLD: 0 %
INTERPRETATION ECG - MUSE: NORMAL
LYMPHOCYTES # BLD AUTO: 1.1 10E3/UL (ref 0.8–5.3)
LYMPHOCYTES NFR BLD AUTO: 11 %
MCH RBC QN AUTO: 33 PG (ref 26.5–33)
MCHC RBC AUTO-ENTMCNC: 32.7 G/DL (ref 31.5–36.5)
MCV RBC AUTO: 101 FL (ref 78–100)
MONOCYTES # BLD AUTO: 0.7 10E3/UL (ref 0–1.3)
MONOCYTES NFR BLD AUTO: 8 %
NEUTROPHILS # BLD AUTO: 7.3 10E3/UL (ref 1.6–8.3)
NEUTROPHILS NFR BLD AUTO: 80 %
NRBC # BLD AUTO: 0 10E3/UL
NRBC BLD AUTO-RTO: 0 /100
P AXIS - MUSE: 26 DEGREES
PLATELET # BLD AUTO: 170 10E3/UL (ref 150–450)
POTASSIUM SERPL-SCNC: 4.4 MMOL/L (ref 3.4–5.3)
PR INTERVAL - MUSE: 146 MS
QRS DURATION - MUSE: 84 MS
QT - MUSE: 434 MS
QTC - MUSE: 444 MS
R AXIS - MUSE: 39 DEGREES
RBC # BLD AUTO: 2.94 10E6/UL (ref 3.8–5.2)
SODIUM SERPL-SCNC: 135 MMOL/L (ref 136–145)
SYSTOLIC BLOOD PRESSURE - MUSE: NORMAL MMHG
T AXIS - MUSE: 63 DEGREES
VENTRICULAR RATE- MUSE: 63 BPM
WBC # BLD AUTO: 9.2 10E3/UL (ref 4–11)

## 2023-01-31 PROCEDURE — 93005 ELECTROCARDIOGRAM TRACING: CPT

## 2023-01-31 PROCEDURE — 99284 EMERGENCY DEPT VISIT MOD MDM: CPT

## 2023-01-31 PROCEDURE — 80048 BASIC METABOLIC PNL TOTAL CA: CPT | Performed by: EMERGENCY MEDICINE

## 2023-01-31 PROCEDURE — 36415 COLL VENOUS BLD VENIPUNCTURE: CPT | Performed by: EMERGENCY MEDICINE

## 2023-01-31 PROCEDURE — 85025 COMPLETE CBC W/AUTO DIFF WBC: CPT | Performed by: EMERGENCY MEDICINE

## 2023-01-31 ASSESSMENT — ENCOUNTER SYMPTOMS
DIZZINESS: 1
LIGHT-HEADEDNESS: 1

## 2023-01-31 ASSESSMENT — ACTIVITIES OF DAILY LIVING (ADL): ADLS_ACUITY_SCORE: 37

## 2023-01-31 NOTE — ED TRIAGE NOTES
Pt BIBA from dialysis. 1/2 way through run , patient had syncopal episode at 1322, had drooling and facial droop on L side. MD told staff to finish her run. All symptoms resolved after 3 minutes. EMS called at 1530. .

## 2023-01-31 NOTE — TELEPHONE ENCOUNTER
"Daughter calling stating mother went unconscious at dialysis center for 3 minutes stating she likely had a stroke and nephrologist is asking for her to contact PCP office to have appointment.     I did urge that if dialysis and nephrology stated it could have been a stroke that urgently getting to an ER is going to be most important to ensure prompt treatment if that is what it ends up being, rather than getting orders for imaging from PCP office to then go to another office to get the imaging done. Daughter agreed with plan stating she much rather take her to the ER to get her \"fully checked out\" as she does not want to take her home knowing \"something serious could be actively going on\".    Routing as FYI and for agreement with advice given.    Thanks!  Blaise Hernandez RN   [facility]    "

## 2023-01-31 NOTE — ED PROVIDER NOTES
History     Chief Complaint:  Syncope    HPI   Supriya Herr is a 73 year old female with a history of chronic kidney disease on dialysis, congestive heart failure, and type 2 diabetes mellitus who presents after an approximate three minute syncopal episode during dialysis. She explains that normally she feels dizzy and almost passes out towards the end of a dialysis run, but has never actually had a syncopal episode. This morning, upon waking, she states feeling completely normal. Today's dialysis followed the normal trend of becoming lightheaded and dizzy towards the end of the run, but this time she lost consciousness for the first time. She began regaining responsiveness three minutes post syncope. She finished the dialysis run and then presented to the ED and now states feeling normal at the time of examination.     Independent Historian: Yes, supplemented by daughter.    Review of External Notes: Thoroughly review prior imaging studies regarding her vasculature.    ROS:  Review of Systems   Neurological: Positive for dizziness (Resolved), syncope and light-headedness (Resolved).   Pertinent positives and negatives as above.  A 14-point review of systems was performed with all other systems reviewed as negative.    Allergies:  Penicillins  Unasyn     Medications:    Albuterol  Norvasc  Otezla  Lipitor  Coreg  Sensipar  Epogen internal jugular  Lasix  Neurontin  Novolog Flexpen  Basaglar Kwikpen  Lantus Solostar  Humalog Pen  Renvela  Zoloft  Zetia  Lopressor  Glucophage XR  Cozaar  Actos  Aspirin 81mg  Zocor  Mucinex  Prednisolone  Neurontin    Past Medical History:    Anemia in CKD  Anxiety  Depression  Basal cell carcinoma  CKD stage 5  Congestive heart failure  Dialysis patient  Dyslipidemia  Basal cell carcinoma  Hyperlipidemia  Hypertension  Obesity  Other chronic pain  Generalized osteoarthritis  Pneumonia  Sleep apnea  Traumatic amputation of leg, unilateral, without mention of complication  Type 2  "diabetes mellitus  Vitiligo    Past Surgical History:    Amputation, left leg   Cataract IOL, RT/LT, bilateral  Colonoscopy  Create fistula arteriovenous upper extremity  Create graft arteriovenous upper extremity bovine  Excise exostosis foot  Eye surgery, repair hole in left retina  IR dialysis fistulogram left x3  IR dialysis mech thromb w/Stent  IR dialysis PTA  Ligate fistula arteriovenous upper extremity  Phacoemulsification clear cornea with standard intraocular lens implant x3  Release trigger finger  Remove hardware foot, right  Repair fistula arteriovenous upper extremity  Retinal reattachment, left  Open reduction and pinning, right foot  Pinning right hip    Family History:    Father - arthritis, cerebrovascular disease, heart failure  Mother - arthritis, deep vein thrombosis, diabetes mellitus, eye disorder, heart failure, hypertension, obesity, snoring  Sister - arthritis, pacemaker implant  Brother - cancer, lung disease    Social History:  Presents to the ED via EMS with her daughter. Reports no longer smoking.   PCP: Noam Jackson     Physical Exam     Patient Vitals for the past 24 hrs:   BP Temp Temp src Pulse Resp SpO2 Height Weight   01/31/23 1835 103/43 -- -- 68 -- 98 % -- --   01/31/23 1735 -- -- -- -- -- 98 % -- --   01/31/23 1720 -- -- -- -- -- 99 % -- --   01/31/23 1705 -- -- -- -- -- 99 % -- --   01/31/23 1650 -- -- -- -- -- 99 % -- --   01/31/23 1644 (!) 146/47 -- -- 64 16 100 % 1.6 m (5' 3\") 94.3 kg (208 lb)   01/31/23 1642 (!) 146/47 98.1  F (36.7  C) Oral 68 16 99 % 1.676 m (5' 6\") 94.3 kg (208 lb)     Physical Exam  Eye:  Pupils are equal, round, and reactive.  Extraocular movements intact.    ENT:  No rhinorrhea.  Moist mucus membranes.  Normal tongue and tonsil.    Cardiac:  Regular rate and rhythm.  No murmurs, gallops, or rubs.    Pulmonary:  Clear to auscultation bilaterally.  No wheezes, rales, or rhonchi.    Abdomen:  Positive bowel sounds.  Abdomen is soft and " non-distended, without focal tenderness.    Musculoskeletal:  Normal movement of all extremities without evidence for deficit. Patient is s/p AKA left lower extremity.    Skin:  Warm and dry without rashes.    CN II - XII intact.  5/5 strength in all extremities.  Normal sensation throughout.  Normal finger to nose and heel to shin.  2+ patellar reflexes.  Normal gait.     Psychiatric:  Normal affect with appropriate interaction with examiner.    Emergency Department Course   ECG  ECG taken at 1637, ECG read at 1651  Sinus rhythm with premature atrial complexes  Nonspecific ST abnormality  Abnormal ECG   Rate 63 bpm. MO interval 146 ms. QRS duration 84 ms. QT/QTc 434/444 ms. P-R-T axes 26 39 63.     Laboratory:  Labs Ordered and Resulted from Time of ED Arrival to Time of ED Departure   BASIC METABOLIC PANEL - Abnormal       Result Value    Sodium 135 (*)     Potassium 4.4      Chloride 98      Carbon Dioxide (CO2) 25      Anion Gap 12      Urea Nitrogen 21.6      Creatinine 2.87 (*)     Calcium 9.4      Glucose 194 (*)     GFR Estimate 17 (*)    CBC WITH PLATELETS AND DIFFERENTIAL - Abnormal    WBC Count 9.2      RBC Count 2.94 (*)     Hemoglobin 9.7 (*)     Hematocrit 29.7 (*)      (*)     MCH 33.0      MCHC 32.7      RDW 12.1      Platelet Count 170      % Neutrophils 80      % Lymphocytes 11      % Monocytes 8      % Eosinophils 1      % Basophils 0      % Immature Granulocytes 0      NRBCs per 100 WBC 0      Absolute Neutrophils 7.3      Absolute Lymphocytes 1.1      Absolute Monocytes 0.7      Absolute Eosinophils 0.1      Absolute Basophils 0.0      Absolute Immature Granulocytes 0.0      Absolute NRBCs 0.0       Emergency Department Course & Assessments:  ED Course as of 01/31/23 1901 Tue Jan 31, 2023   1808 Family requested I speak with patient's dialysis nurse. I spoke with them regarding the patient.     Independent Interpretation (X-rays, CTs, rhythm strip):  None    Consultations/Discussion of  "Management or Tests:  I spoke with Jessi, the nurse from the dialysis clinic.    Social Determinants of Health affecting care:  [place]    Assessments:  1659 I obtained history and examined the patient as noted above.  1755 I rechecked the patient and explained findings.  1822 I rechecked the patient and explained findings. I believe that they are safe for discharge at this time.    Disposition:  The patient was discharged to home.     Impression & Plan      Medical Decision Making:  This very pleasant 73-year-old woman with a history of end-stage renal disease on dialysis presents to us because of what is consistent with a syncopal spell at her dialysis run today.  Patient states that it is not atypical for her to get lightheaded during dialysis.  Per the staff, she has actually passed out once or twice as well 2.  She often describes feeling warm and sweaty before it occurs.    The patient states that she felt completely well when she went to dialysis today.  Her run was started as typical.  She noted that [facility] through she started to feel typical symptoms of lightheadedness.  She alerted the nurse and asked for a dose of Tylenol, feeling a mild headache.  She then experienced worsening of darkening of her field of vision and diaphoresis.  When the nurse arrived, she had a syncopal spell where nursing stated that they found her to be slumped forward.  They laid her back and noted that she was pale, with her eyes wide open.  She appeared to be trying to communicate, but was speaking \"gibberish.\"  Nursing was chiefly concerned because they thought it appeared as though she might have some drooling from the left side of her mouth and possibly some drooping of that side of her face.  They are also concerned that this spell of lightheadedness/syncope lasted longer than typical, resolving after approximately 3 minutes.  The nurse was on the phone with EMS when the patient came to.  She \"cracked a joke\" and was " completely back to her baseline, understanding where she was and what was going on.  The nurse states that on reviewing the patient, she no longer had any evidence of a facial droop, drooling, or altered mental status.  Therefore, EMS was called off.  The nurse also spoke with her nephrologist who recommended that they finish out the dialysis run, which finished after approximately 2 hours.    When the daughter arrived to  the patient, the dialysis nurse told the daughter about today's situation.  Per the nurse, the nephrologist recommended that the patient be seen at some point by neurology and possibly consider some imaging because of the spell.  However, it did not seem as though these recommendations were emergent.  Nonetheless, the daughter was worried about the report of the spell and therefore brought her mother to the emergency department from dialysis for assessment.    At the time of my evaluation, patient is resting comfortably in the bed in excellent spirits.  She has absolutely no complaints for me at all.  I put her through a thorough neurologic exam and she shows no evidence of a deficit.  EKG was obtained which is normal.  Labs are obtained which are appropriate for someone who just finished her dialysis run.    I spent in excess of 20 minutes with this patient and her daughter describing syncope, issues of blood pressure that are associated with dialysis, and furthermore discussing typical signs and symptoms of stroke and TIA.  Considering this occurred with dialysis and the patient experienced typical features of a prodrome to syncope followed by a 3-minute spell where her eyes were open, she was attempting to communicate, but was diaphoretic and lightheaded with then immediate return of normal mental state less than 3 minutes later with no other ongoing neurologic deficits, I explained to them that this seems highly unlikely to represent an actual intracranial event, but 1 that is far more  "likely to be related to poor cerebral perfusion secondary to fluid shifts, blood pressure swings, and syncope.  The daughter specifically requested I speak with the dialysis nurse to understand the exact events and I spent 10 minutes on the phone with her going over the exact events.  It was very clear that this \"facial droop\" was only present when the patient was not able to appropriately interact and completely resolved shortly thereafter when her mentation returned to normal.    I went back and spoke further with the family.  I explained to them that I did not feel that there was any indication for intracranial imaging as I have no concerns that there would be a stroke related to today's events.  I explained the only other concern I could pin this on would be the possibility of having some carotid artery stenosis which could predispose her to having syncopal spells and that it would not be unreasonable to image her carotids.  However, I explained that this is not anything that would need to be performed emergently today as this has been an ongoing issue and there is no evidence of an acute pathology.  Considering that the patient is due for imaging through her oncology team, the patient and daughter asked if this imaging could be put off until next week when she is scheduled to undergo CT.  I explained that that would be very reasonable and that if a CT angiogram of the neck could be added on or carotid ultrasounds added on, that this would be very reasonable to be performed as an outpatient.  In the end, this is their desire rather than staying in the emergency department for another extended period.  I fully support this and feel she is safe for discharge home.  Otherwise, I explained that they can return to the ER at any point if she experiences spells of altered mental status not related to dialysis, any signs of neurologic deficits not related to syncope, or if they change their mind about pursuing a more " aggressive imaging strategy.    Diagnosis:    ICD-10-CM    1. Syncope, unspecified syncope type  R55       2. History of end stage renal disease  Z87.448         Scribe Disclosure:  I, Lyndon Pond, am serving as a scribe at 6:18 PM on 1/31/2023 to document services personally performed by Trierweiler, Chad A, MD based on my observations and the provider's statements to me.   1/31/2023   Trierweiler, Chad A, MD Trierweiler, Chad A, MD  02/02/23 1228

## 2023-01-31 NOTE — ED TRIAGE NOTES
Triage Assessment     Row Name 01/31/23 1645       Respiratory WDL    Respiratory WDL WDL       Cardiac WDL    Cardiac WDL WDL       Cognitive/Neuro/Behavioral WDL    Cognitive/Neuro/Behavioral WDL WDL

## 2023-01-31 NOTE — ED NOTES
Bed: ED19  Expected date:   Expected time:   Means of arrival:   Comments:  [id number] Syncopal Episode

## 2023-02-02 ENCOUNTER — PATIENT OUTREACH (OUTPATIENT)
Dept: NEPHROLOGY | Facility: CLINIC | Age: 73
End: 2023-02-02
Payer: MEDICARE

## 2023-02-02 ENCOUNTER — TELEPHONE (OUTPATIENT)
Dept: TRANSPLANT | Facility: CLINIC | Age: 73
End: 2023-02-02
Payer: MEDICARE

## 2023-02-02 DIAGNOSIS — T82.898A PROBLEM WITH DIALYSIS ACCESS, INITIAL ENCOUNTER (H): Primary | ICD-10-CM

## 2023-02-02 NOTE — TELEPHONE ENCOUNTER
"The ER notes says\"  I went back and spoke further with the family.  I explained to them that I did not feel that there was any indication for intracranial imaging as I have no concerns that there would be a stroke related to today's events.  I explained the only other concern I could pin this on would be the possibility of having some carotid artery stenosis which could predispose her to having syncopal spells and that it would not be unreasonable to image her carotids.  However, I explained that this is not anything that would need to be performed emergently today as this has been an ongoing issue and there is no evidence of an acute pathology.  Considering that the patient is due for imaging through her oncology team, the patient and daughter asked if this imaging could be put off until next week when she is scheduled to undergo CT.  I explained that that would be very reasonable and that if a CT angiogram of the neck could be added on or carotid ultrasounds added on, that this would be very reasonable to be performed as an outpatient      So is she seeing Oncology this week as his note says?if so they need to order the tests thatbtheybwant  I do not want to use dye as it can  Hurt her kidneys   Ok with ULTRASOUND       "

## 2023-02-02 NOTE — PROGRESS NOTES
"Notified by pt daughter, Caroline, that pt c/o left hand and fingers are slightly cold, pain, numbness and tingling. Reports symptoms seem to be increasing and can be worse with dialysis.  Pt has neuropathy and pt daughter is unsure if her symptoms are neuropathy or AV graft related.    Pt daughter describes symptoms as left hand \"throbbing\" and finger tips hurt \"feels like I have a sliver or cut or something in there\"  Pt keeps looking at her left hand/fingers indicating to the patients daughter that there is something wrong.  (Pt has been complaining the last couple of months.  Pt complaints have been increasing over the past month or so).    Pt is down to 1 gabapentin QAM, with room to increase if needed.  Pt daughter would like to assess the graft to make sure the graft is not causing the symptoms prior to assuming the symptoms are related to pt neuropathy.    S/p right upper extremity ligation 2/2/22 with Dr. Banks 2/2 steal syndrome after multiple unsuccessful interventions.  RUE AVF was created because pt was starting to have issues requiring frequent interventions with LUE AVG, indicating the AVG was not going to sustain for HD access much longer.    Pt has LUE AVG created 5/7/2020 by Dr. Martin, which is the access currently being used for dialysis.    Neph Tracking Flowsheet Last Filled Values     Final Modality Hemodialysis    Patient's Referral Dates Auto Populate Patient's Referral Dates    Dietician Referral 12/28/21    MTM Referral 6/25/18    Home Care Referral 11/26/21    Vein Mapping/US  02/06/23  steal studies on LUE    Diaylsis Access Type AV Graft    Dialysis Access Site [facility]    Dialysis Access Surgery 05/07/20    Dialysis Access Surgeon BERNY CAZARESG with Dr. Martin.  Currently followed by Dr. Banks.  Had RUE AVF created and then ligated prior to use d/t steal syndrome 2/2/22.         Dialysis History      Start End Type Center Comments    6/13/2020  Hemo-In Center [facility] ([facility]) " Dialysis day: Tues/Thurs/Sat. Nephrologist: Dr. Basilio              Hemodialysis Vascular Access Arteriovenous graft Left Forearm (Active)   Number of days: 1001     Per BERNY Shultz ultrasound ordered to assess AVG and blood flow to the left hand.  Pt scheduled per pt daughter, for Monday.    Will continue to follow.    KIRTI GRESHAM RN on 2/2/2023 at 2:08 PM  Dialysis Access Care Coordinator  Phone: [phone]  Pool: P_Dialysis_Access_Nurse

## 2023-02-02 NOTE — TELEPHONE ENCOUNTER
Daughter calling stating she took her mother to the ER and provider there told her it would be reasonable to wait on the CT. But needs an order for a CT scan with dye as ordered CT scan does not have dye and the provider requested pt reach out to provider for this CT angiogram order.    Also stated could be CT scan with contrast, angiogram, or ultrasound.    Pended order for all three with your discretion for which you would rather put through.    Thanks!  Blaise Hernandez RN   [facility]

## 2023-02-02 NOTE — TELEPHONE ENCOUNTER
Received message from pts daughter requesting carotid US or CT angiogram after ER visit for syncope. Returned aCroline's call, updated Carotid US or CT angiogram will need be arranged with PCP. Left  with direct line for return call.     2/2/23 addendum: Received call back from Caroline. Reviewed above information. Caroline also discussed some concerns regarding the pts dialysis access. Reporting some pain/ numbness. RNCC sent message to dialysis access coordinators to contact Caroline.

## 2023-02-03 ENCOUNTER — OFFICE VISIT (OUTPATIENT)
Dept: ORTHOPEDICS | Facility: CLINIC | Age: 73
End: 2023-02-03
Payer: MEDICARE

## 2023-02-03 ENCOUNTER — HOSPITAL ENCOUNTER (OUTPATIENT)
Dept: PHYSICAL THERAPY | Facility: CLINIC | Age: 73
Setting detail: THERAPIES SERIES
Discharge: HOME | End: 2023-02-03
Attending: PHYSICAL MEDICINE & REHABILITATION
Payer: MEDICARE

## 2023-02-03 DIAGNOSIS — B35.1 OM (ONYCHOMYCOSIS): ICD-10-CM

## 2023-02-03 DIAGNOSIS — L84 TYLOMA: Primary | ICD-10-CM

## 2023-02-03 DIAGNOSIS — I73.89 OTHER SPECIFIED PERIPHERAL VASCULAR DISEASES (H): ICD-10-CM

## 2023-02-03 DIAGNOSIS — I73.9 PAD (PERIPHERAL ARTERY DISEASE) (H): ICD-10-CM

## 2023-02-03 PROCEDURE — 11720 DEBRIDE NAIL 1-5: CPT | Mod: XS | Performed by: PODIATRIST

## 2023-02-03 PROCEDURE — 99213 OFFICE O/P EST LOW 20 MIN: CPT | Mod: 25 | Performed by: PODIATRIST

## 2023-02-03 PROCEDURE — 11055 PARING/CUTG B9 HYPRKER LES 1: CPT | Performed by: PODIATRIST

## 2023-02-03 PROCEDURE — 97110 THERAPEUTIC EXERCISES: CPT | Mod: GP | Performed by: PHYSICAL THERAPIST

## 2023-02-03 NOTE — LETTER
2/3/2023         RE: Supriya Herr  [address]  [place] [place] [address]        Dear Colleague,    Thank you for referring your patient, Supriya Herr, to the [facility] [place]. Please see a copy of my visit note below.    Chief Complaint   Patient presents with     Follow Up     3 month follow up.             Allergies   Allergen Reactions     Penicillins Rash     Unasyn Rash     No evidence SJS, but very uncomfortable and precipitated multiple provider visits. Would not use penicillins again if other options available.          Subjective: Supriya is a 73 year old female who presents to the clinic today for a diabetic foot exam and management.  Her nails do get long.  she has a history of amputation and from previous notes, we have documented that she has nonpalpable DP and PT pulses.     Objective    Hemoglobin A1C   Date Value Ref Range Status   11/23/2021 6.2 (H) 0.0 - 5.6 % Final     Comment:     Normal <5.7%   Prediabetes 5.7-6.4%    Diabetes 6.5% or higher     Note: Adopted from ADA consensus guidelines.   04/09/2021 6.2 (H) 0 - 5.6 % Final     Comment:     Normal <5.7% Prediabetes 5.7-6.4%  Diabetes 6.5% or higher - adopted from ADA   consensus guidelines.           Non-palpable DP and PT pulses L.   Equinus noted BL. Pes planus with rigid toe deformities noted to lesser digits on the right. Left AKA noted.   Nails are thickened, discolored, elongated, with subungual debris consistent with onychomycosis.          Assessment: DM2 with left AKA and neuropathy. She has severe vasculopathy.  Onychomycosis.   Tyloma     Plan:   - Pt seen and evaluated  - Nails debrided x 5.  - Tyloma debrided x 1  - Cont compression socks.  - See again in 3 months.      Eleazar Pack, FRAN

## 2023-02-03 NOTE — PROGRESS NOTES
Chief Complaint   Patient presents with     Follow Up     3 month follow up.             Allergies   Allergen Reactions     Penicillins Rash     Unasyn Rash     No evidence SJS, but very uncomfortable and precipitated multiple provider visits. Would not use penicillins again if other options available.          Subjective: Supriya is a 73 year old female who presents to the clinic today for a diabetic foot exam and management.  Her nails do get long.  she has a history of amputation and from previous notes, we have documented that she has nonpalpable DP and PT pulses.     Objective    Hemoglobin A1C   Date Value Ref Range Status   11/23/2021 6.2 (H) 0.0 - 5.6 % Final     Comment:     Normal <5.7%   Prediabetes 5.7-6.4%    Diabetes 6.5% or higher     Note: Adopted from ADA consensus guidelines.   04/09/2021 6.2 (H) 0 - 5.6 % Final     Comment:     Normal <5.7% Prediabetes 5.7-6.4%  Diabetes 6.5% or higher - adopted from ADA   consensus guidelines.           Non-palpable DP and PT pulses L.   Equinus noted BL. Pes planus with rigid toe deformities noted to lesser digits on the right. Left AKA noted.   Nails are thickened, discolored, elongated, with subungual debris consistent with onychomycosis.          Assessment: DM2 with left AKA and neuropathy. She has severe vasculopathy.  Onychomycosis.   Tyloma     Plan:   - Pt seen and evaluated  - Nails debrided x 5.  - Tyloma debrided x 1  - Cont compression socks.  - See again in 3 months.

## 2023-02-06 ENCOUNTER — ANCILLARY PROCEDURE (OUTPATIENT)
Dept: ULTRASOUND IMAGING | Facility: CLINIC | Age: 73
End: 2023-02-06
Attending: SURGERY
Payer: MEDICARE

## 2023-02-06 PROCEDURE — 93990 DOPPLER FLOW TESTING: CPT | Performed by: RADIOLOGY

## 2023-02-06 PROCEDURE — 93922 UPR/L XTREMITY ART 2 LEVELS: CPT | Performed by: RADIOLOGY

## 2023-02-08 ENCOUNTER — HOSPITAL ENCOUNTER (OUTPATIENT)
Dept: PHYSICAL THERAPY | Facility: CLINIC | Age: 73
Setting detail: THERAPIES SERIES
Discharge: HOME | End: 2023-02-08
Attending: PHYSICAL MEDICINE & REHABILITATION
Payer: MEDICARE

## 2023-02-08 ENCOUNTER — ANCILLARY PROCEDURE (OUTPATIENT)
Dept: ULTRASOUND IMAGING | Facility: CLINIC | Age: 73
End: 2023-02-08
Attending: FAMILY MEDICINE
Payer: MEDICARE

## 2023-02-08 DIAGNOSIS — Z86.73 HISTORY OF TIA (TRANSIENT ISCHEMIC ATTACK) AND STROKE: ICD-10-CM

## 2023-02-08 PROCEDURE — 93880 EXTRACRANIAL BILAT STUDY: CPT | Performed by: RADIOLOGY

## 2023-02-08 PROCEDURE — 97530 THERAPEUTIC ACTIVITIES: CPT | Mod: GP | Performed by: PHYSICAL THERAPIST

## 2023-02-08 PROCEDURE — 97110 THERAPEUTIC EXERCISES: CPT | Mod: GP | Performed by: PHYSICAL THERAPIST

## 2023-02-10 ENCOUNTER — HOSPITAL ENCOUNTER (OUTPATIENT)
Dept: PHYSICAL THERAPY | Facility: CLINIC | Age: 73
Setting detail: THERAPIES SERIES
Discharge: HOME | End: 2023-02-10
Attending: PHYSICAL MEDICINE & REHABILITATION
Payer: MEDICARE

## 2023-02-10 PROCEDURE — 97530 THERAPEUTIC ACTIVITIES: CPT | Mod: GP | Performed by: PHYSICAL THERAPIST

## 2023-02-10 PROCEDURE — 97110 THERAPEUTIC EXERCISES: CPT | Mod: GP | Performed by: PHYSICAL THERAPIST

## 2023-02-11 DIAGNOSIS — Z79.4 TYPE 2 DIABETES MELLITUS WITH HYPERGLYCEMIA, WITH LONG-TERM CURRENT USE OF INSULIN (H): ICD-10-CM

## 2023-02-11 DIAGNOSIS — E11.65 TYPE 2 DIABETES MELLITUS WITH HYPERGLYCEMIA, WITH LONG-TERM CURRENT USE OF INSULIN (H): ICD-10-CM

## 2023-02-13 RX ORDER — PEN NEEDLE, DIABETIC 32GX 5/32"
NEEDLE, DISPOSABLE MISCELLANEOUS
Qty: 400 EACH | Refills: 1 | Status: SHIPPED | OUTPATIENT
Start: 2023-02-13 | End: 2023-12-18

## 2023-02-13 RX ORDER — INSULIN ASPART 100 [IU]/ML
INJECTION, SOLUTION INTRAVENOUS; SUBCUTANEOUS
Qty: 15 ML | Refills: 1 | Status: SHIPPED | OUTPATIENT
Start: 2023-02-13 | End: 2023-06-12

## 2023-02-13 NOTE — TELEPHONE ENCOUNTER
Short Acting Insulin Protocol Failed 02/11/2023 11:05 AM   Protocol Details  HgbA1C in past 3 or 6 months    Recent (6 mo) or future (30 days) visit within the authorizing provider's specialty     Lab order in place   LUIS ALBERTO 08/10/2022

## 2023-02-15 ENCOUNTER — HOSPITAL ENCOUNTER (OUTPATIENT)
Dept: PHYSICAL THERAPY | Facility: CLINIC | Age: 73
Setting detail: THERAPIES SERIES
Discharge: HOME | End: 2023-02-15
Attending: PHYSICAL MEDICINE & REHABILITATION
Payer: MEDICARE

## 2023-02-15 PROCEDURE — 97530 THERAPEUTIC ACTIVITIES: CPT | Mod: GP | Performed by: PHYSICAL THERAPIST

## 2023-02-15 PROCEDURE — 97110 THERAPEUTIC EXERCISES: CPT | Mod: GP | Performed by: PHYSICAL THERAPIST

## 2023-02-15 NOTE — PROGRESS NOTES
Outcome for 02/15/23 9:20 AM: [employer] message sent  Gracie Perez LPN   Outcome for 02/20/23 1:56 PM: Left Voicemail   Jud Cabrera MA  Outcome for 02/21/23 10:26 AM: Per patient, will send BG readings via [employer].- [facility] readings.   Jud Cabrera MA  Outcome for 02/22/23 12:38 PM: Pt will share gloucose with provider at visit time   Taylor Myhre, RMA    Patient is showing 4/5 MNCM met. Aspirin not prescribed   Jud Cabrera MA

## 2023-02-16 NOTE — PROGRESS NOTES
02/15/23 1300   Signing Clinician's Name / Credentials   Signing clinician's name / credentials Jonny PT   Session Number   Session Number 10   Goal 1   Goal Identifier Standing tolerance   Goal Description Pt will be able to tolerate standing >5 min in parallel bars w/ single UE support in order to build weightbearing tolerance of R LE   Goal Progress eval: able to  // bars with heavy B UE support x30 sec.  2/15/23  able to stand 3x today less pressure on bars. sba to cga.   Target Date 05/12/23   Goal 2   Goal Identifier Transfers   Goal Description Pt will demonstrate proper mechanics of stand pivot transfer with walker from chair<>mat in order to improve safety with transfers at home   Goal Progress eval: see transfers in eval for description. 2/15/23 pt can do a squat pivot tx w/ sba but not stand pivot yet   Target Date 05/12/23   Goal 3   Goal Identifier HEP   Goal Description Pt will be inependent with HEP at least 4x/week in order to improve self management of symptoms   Target Date 03/08/23   Date Met 02/15/23   Subjective Report   Subjective Report not feeling great.  tired. Williams drove her today.   Objective Measure 1   Objective Measure Standing tolerance   Details 20 sec, then 30 sec x 3 in //bars sba   Vitals Signs   Heart Rate 68   SpO2 98   Blood Pressure 121/40   Vital Signs Comments seated R arm   Therapeutic Procedure/exercise   Therapeutic Procedures: strength, endurance, ROM, flexibillity minutes (10443) 28   Skilled Intervention mat exer   Patient Response kamar well, motivated   Treatment Detail mat and seated exer,  incl UE yellow band.  mat exer incl R quad sets, right knee ext stretch x 2 min w/ heel on bolster.  L abduction x 20 leg.  mini bridges x 10 x 2 sets. seated w/c pushups x 5, supine yellow band UE pull aparts 20 reps. all w/ cues/demo.   Therapeutic Activity   Therapeutic Activities: dynamic activities to improve functional performance minutes (65883) 12   Skilled  Intervention standing, transfers   Patient Response improving, only kamar 30 sec stand today however better quality   Treatment Detail tx w/ sba squat pivot tx to her L to mat to right to w/c . standing as above. improving quality.   Education   Learner Patient;Family   Readiness Acceptance   Method Explanation   Response Verbalizes Understanding   Education Comments standing, HEP   Plan   Homework above   Home program reciprocal inhibition stretch of L hip flexor; cont to stand at kitchen counter.   seated w/c pushups.  ap's / laq on R, hip flexion on L seated.   bilat sidelying hip ext and supine hip abduction bilat done one at a time.   Updates to plan of care see progress note today   Plan for next session cont mat exer, standing - try stand pivot tx.  w/ walker?   Total Session Time   Timed Code Treatment Minutes 40   Total Treatment Time (sum of timed and untimed services) 40

## 2023-02-16 NOTE — PROGRESS NOTES
[facility]    OUTPATIENT PHYSICAL THERAPY  PLAN OF TREATMENT FOR OUTPATIENT REHABILITATION AND PROGRESS NOTE           Patient's Last Name, First Name, Supriya Hernandez Date of Birth  1950   Provider's Name  [facility] Medical Record No.  [id number]    Onset Date  11/18/22 Start of Care Date  12/9/22   Type:     _X_PT   ___OT   ___SLP Medical Diagnosis  L AKA amputation   PT Diagnosis  Decreased mobility due to AKA Plan of Treatment  Frequency/Duration: 2x weekly and taper x 90 days  Certification date from 2/15/23 to 5/12/23     Goals:  Goal Identifier Standing tolerance   Goal Description Pt will be able to tolerate standing >5 min in parallel bars w/ single UE support in order to build weightbearing tolerance of R LE   Target Date 05/12/23   Date Met      Progress (detail required for progress note): eval: able to  // bars with heavy B UE support x30 sec.  2/15/23  able to stand 3x today less pressure on bars. sba to cga.     Goal Identifier Transfers   Goal Description Pt will demonstrate proper mechanics of stand pivot transfer with walker from chair<>mat in order to improve safety with transfers at home   Target Date 05/12/23   Date Met      Progress (detail required for progress note): eval: see transfers in eval for description. 2/15/23 pt can do a squat pivot tx w/ sba but not stand pivot yet     Goal Identifier HEP   Goal Description Pt will be inependent with HEP at least 4x/week in order to improve self management of symptoms   Target Date 03/08/23   Date Met  02/15/23   Progress (detail required for progress note):             Beginning/End Dates of Progress Note Reporting Period:  12/9/22 to 2/15/23    Progress Toward Goals:   Progress this reporting period: good progress w/ mat exer, standing and transfers, motivated w/ good family  support    Client Self (Subjective) Report for Progress Note Reporting Period: not feeling great.  tired. [place] drove her today.        Objective Measurements:   Objective Measure: Standing tolerance  Details: 20 sec, then 30 sec x 3 in //bars sba                I CERTIFY THE NEED FOR THESE SERVICES FURNISHED UNDER        THIS PLAN OF TREATMENT AND WHILE UNDER MY CARE     (Physician co-signature of this document indicates review and certification of the therapy plan).                Referring Provider: MD Dorene Bryant, PT

## 2023-02-17 ENCOUNTER — HOSPITAL ENCOUNTER (OUTPATIENT)
Dept: PHYSICAL THERAPY | Facility: CLINIC | Age: 73
Setting detail: THERAPIES SERIES
Discharge: HOME | End: 2023-02-17
Attending: PHYSICAL MEDICINE & REHABILITATION
Payer: MEDICARE

## 2023-02-17 PROCEDURE — 97110 THERAPEUTIC EXERCISES: CPT | Mod: GP | Performed by: PHYSICAL THERAPIST

## 2023-02-19 DIAGNOSIS — E78.00 HYPERCHOLESTEROLEMIA: ICD-10-CM

## 2023-02-20 ENCOUNTER — MYC MEDICAL ADVICE (OUTPATIENT)
Dept: FAMILY MEDICINE | Facility: CLINIC | Age: 73
End: 2023-02-20
Payer: MEDICARE

## 2023-02-20 ENCOUNTER — TELEPHONE (OUTPATIENT)
Dept: ENDOCRINOLOGY | Facility: CLINIC | Age: 73
End: 2023-02-20
Payer: MEDICARE

## 2023-02-20 RX ORDER — ATORVASTATIN CALCIUM 20 MG/1
TABLET, FILM COATED ORAL
Qty: 90 TABLET | Refills: 3 | Status: SHIPPED | OUTPATIENT
Start: 2023-02-20 | End: 2024-02-13

## 2023-02-20 NOTE — TELEPHONE ENCOUNTER
Called patient and left voicemail. Patient has an appointment on 2/22/23. Need patient to upload their Avery device to site for provider to review prior to their appointment.  Jud Cabrera MA

## 2023-02-20 NOTE — TELEPHONE ENCOUNTER
"Requested Prescriptions   Pending Prescriptions Disp Refills     atorvastatin (LIPITOR) 20 MG tablet [Pharmacy Med Name: ATORVASTATIN 20 MG TABLET] 90 tablet 3     Sig: TAKE 1 TABLET BY MOUTH EVERY EVENING       Statins Protocol Failed - 2/19/2023  3:19 PM        Failed - LDL on file in past 12 months     Recent Labs   Lab Test 01/17/20  1204   LDL 74             Passed - No abnormal creatine kinase in past 12 months     Recent Labs   Lab Test 11/05/17  0903   CKT 18*                Passed - Recent (12 mo) or future (30 days) visit within the authorizing provider's specialty     Patient has had an office visit with the authorizing provider or a provider within the authorizing providers department within the previous 12 mos or has a future within next 30 days. See \"Patient Info\" tab in inbasket, or \"Choose Columns\" in Meds & Orders section of the refill encounter.              Passed - Medication is active on med list        Passed - Patient is age 18 or older        Passed - No active pregnancy on record        Passed - No positive pregnancy test in past 12 months           Routing refill request to provider for review/approval because medication did not pass protocol.    Pt was seen on 09/19/22    Dionne Lei RN  [facility] Medicine   "

## 2023-02-21 NOTE — TELEPHONE ENCOUNTER
Spoke with pt's daughter. They are unable to upload sara from home and unable to come into clinic for upload. Pt's daughter will try and send sara readings via [email or web address] otherwise will give them at appointment time. Jud Cabrera CMA on 2/21/2023 at 10:30 AM

## 2023-02-22 ENCOUNTER — VIRTUAL VISIT (OUTPATIENT)
Dept: ENDOCRINOLOGY | Facility: CLINIC | Age: 73
End: 2023-02-22
Payer: MEDICARE

## 2023-02-22 DIAGNOSIS — Z79.4 TYPE 2 DIABETES MELLITUS WITH DIABETIC NEUROPATHY, WITH LONG-TERM CURRENT USE OF INSULIN (H): Primary | ICD-10-CM

## 2023-02-22 DIAGNOSIS — E11.40 TYPE 2 DIABETES MELLITUS WITH DIABETIC NEUROPATHY, WITH LONG-TERM CURRENT USE OF INSULIN (H): Primary | ICD-10-CM

## 2023-02-22 PROCEDURE — 99442 PR PHYSICIAN TELEPHONE EVALUATION 11-20 MIN: CPT | Mod: 95 | Performed by: PHYSICIAN ASSISTANT
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
## 2023-02-22 NOTE — NURSING NOTE
Is the patient currently in the state of [place]? YES    Visit mode:VIDEO    If the visit is dropped, the patient can be reconnected by: TELEPHONE VISIT: Phone number: [phone]    Will anyone else be joining the visit? NO      How would you like to obtain your AVS? MyChart    Are changes needed to the allergy or medication list? NO    Reason for visit: BG follow

## 2023-02-28 ENCOUNTER — TELEPHONE (OUTPATIENT)
Dept: DERMATOLOGY | Facility: CLINIC | Age: 73
End: 2023-02-28
Payer: MEDICARE

## 2023-02-28 NOTE — TELEPHONE ENCOUNTER
PA Initiation    Medication: Otezla  Insurance Company: [employer] Part D - Phone [phone] Fax [phone]  Pharmacy Filling the Rx: [facility] - [place], [place] - [facility] [address]  Filling Pharmacy Phone:    Filling Pharmacy Fax:    Start Date: 2/28/2023    Key: [id number]

## 2023-02-28 NOTE — TELEPHONE ENCOUNTER
Prior Authorization Approval    Authorization Effective Date: 1/1/2023  Authorization Expiration Date: 2/28/2024  Medication: Otezla  Approved Dose/Quantity: 60 per 30 days  Reference #: Key: [id number]   Insurance Company: [employer] Part D - Phone [phone] Fax [phone]  Expected CoPay:       CoPay Card Available:      Foundation Assistance Needed:    Which Pharmacy is filling the prescription (Not needed for infusion/clinic administered): [facility] - [place], [place] - [facility] [address]  Pharmacy Notified: No  Patient Notified: No

## 2023-03-08 ENCOUNTER — HOSPITAL ENCOUNTER (OUTPATIENT)
Dept: PHYSICAL THERAPY | Facility: CLINIC | Age: 73
Setting detail: THERAPIES SERIES
Discharge: HOME | End: 2023-03-08
Attending: PHYSICAL MEDICINE & REHABILITATION
Payer: MEDICARE

## 2023-03-08 PROCEDURE — 97530 THERAPEUTIC ACTIVITIES: CPT | Mod: GP | Performed by: PHYSICAL THERAPIST

## 2023-03-08 NOTE — PROGRESS NOTES
Late entry from 2/6/23:    Reviewed US with Dr. Banks inquiring if US indicates need for fistulogram.  Dr. Banks's response:  Kennedy Banks MD  P Dialysis Access Nurse  1) if it's working well I would not fistulogram     2) this doesn't sound like steal. It's probably neuropathy     Deisy Crisostomo RN, BSN  Dialysis Access Care Coordinator - Nephrology  [phone]  Alternative contact: Alina Watkins RN  [phone]  Pool: P_Dialysis_Access_Nurse

## 2023-03-13 ENCOUNTER — MYC MEDICAL ADVICE (OUTPATIENT)
Dept: FAMILY MEDICINE | Facility: CLINIC | Age: 73
End: 2023-03-13
Payer: MEDICARE

## 2023-03-13 DIAGNOSIS — F41.1 GAD (GENERALIZED ANXIETY DISORDER): ICD-10-CM

## 2023-03-13 NOTE — TELEPHONE ENCOUNTER
Routing refill request to provider for review/approval because:  A break in medication last script written 8/17/21    Viridiana Sprague RN   [facility]

## 2023-03-13 NOTE — TELEPHONE ENCOUNTER
Rishabht message requesting sertraline 50mg refill.    Blaise Hernandez RN   [facility]

## 2023-03-17 ENCOUNTER — HOSPITAL ENCOUNTER (OUTPATIENT)
Dept: PHYSICAL THERAPY | Facility: CLINIC | Age: 73
Setting detail: THERAPIES SERIES
Discharge: HOME | End: 2023-03-17
Attending: PHYSICAL MEDICINE & REHABILITATION
Payer: MEDICARE

## 2023-03-17 PROCEDURE — 97110 THERAPEUTIC EXERCISES: CPT | Mod: GP | Performed by: PHYSICAL THERAPIST

## 2023-03-24 ENCOUNTER — HOSPITAL ENCOUNTER (OUTPATIENT)
Dept: PHYSICAL THERAPY | Facility: CLINIC | Age: 73
Setting detail: THERAPIES SERIES
Discharge: HOME | End: 2023-03-24
Attending: PHYSICAL MEDICINE & REHABILITATION
Payer: MEDICARE

## 2023-03-24 PROCEDURE — 97110 THERAPEUTIC EXERCISES: CPT | Mod: GP | Performed by: PHYSICAL THERAPIST

## 2023-03-24 NOTE — DISCHARGE INSTRUCTIONS
3/24/23    Listen to music when you do your exercises.  Try [employer] Pat Gottlieb - chubby checkers  Or   Ear plugs.    Use red band for leg press Right leg push outs.  Hold with right hand.  10 x, 2 to 3 sets.    Try Yoga with Neda.      Keep standing.    Dorene MAY

## 2023-03-29 ENCOUNTER — HOSPITAL ENCOUNTER (OUTPATIENT)
Dept: PHYSICAL THERAPY | Facility: CLINIC | Age: 73
Setting detail: THERAPIES SERIES
Discharge: HOME | End: 2023-03-29
Attending: PHYSICAL MEDICINE & REHABILITATION
Payer: MEDICARE

## 2023-03-29 PROCEDURE — 97110 THERAPEUTIC EXERCISES: CPT | Mod: GP | Performed by: PHYSICAL THERAPIST

## 2023-03-31 ENCOUNTER — HOSPITAL ENCOUNTER (OUTPATIENT)
Dept: PHYSICAL THERAPY | Facility: CLINIC | Age: 73
Setting detail: THERAPIES SERIES
Discharge: HOME | End: 2023-03-31
Attending: PHYSICAL MEDICINE & REHABILITATION
Payer: MEDICARE

## 2023-03-31 PROCEDURE — 97110 THERAPEUTIC EXERCISES: CPT | Mod: GP | Performed by: PHYSICAL THERAPIST

## 2023-04-09 ENCOUNTER — HEALTH MAINTENANCE LETTER (OUTPATIENT)
Age: 73
End: 2023-04-09

## 2023-04-13 DIAGNOSIS — E11.3213 TYPE 2 DIABETES MELLITUS WITH MILD NONPROLIFERATIVE RETINOPATHY OF BOTH EYES AND MACULAR EDEMA, UNSPECIFIED WHETHER LONG TERM INSULIN USE (H): Primary | ICD-10-CM

## 2023-04-17 ENCOUNTER — MYC MEDICAL ADVICE (OUTPATIENT)
Dept: FAMILY MEDICINE | Facility: CLINIC | Age: 73
End: 2023-04-17
Payer: MEDICARE

## 2023-04-17 ENCOUNTER — TELEPHONE (OUTPATIENT)
Dept: FAMILY MEDICINE | Facility: CLINIC | Age: 73
End: 2023-04-17
Payer: MEDICARE

## 2023-04-17 DIAGNOSIS — L89.309 PRESSURE INJURY OF SKIN OF BUTTOCK, UNSPECIFIED INJURY STAGE, UNSPECIFIED LATERALITY: Primary | ICD-10-CM

## 2023-04-17 NOTE — TELEPHONE ENCOUNTER
Caller: Caroline Brothers    Provider: New wound referral    Detailed reason for call: Please review new wound referral for pressure injury of skin of buttock and advise provider/imaging.    Daughter will bring her to any location to be seen as soon as possible.    Best phone number to contact: [phone]    Best time to contact: any    Ok to leave a detailed message: Yes

## 2023-04-17 NOTE — TELEPHONE ENCOUNTER
Yes they need to go to wound care, we heard from the [place] wound care clinic last week that they have quick openings

## 2023-04-17 NOTE — TELEPHONE ENCOUNTER
Dr Jackson    See pt Mychart message and attached media   homecare?, do you want to do a referral? Or wound clinic    Viridiana Sprague RN   [facility]

## 2023-04-19 ENCOUNTER — OFFICE VISIT (OUTPATIENT)
Dept: VASCULAR SURGERY | Facility: CLINIC | Age: 73
End: 2023-04-19
Attending: FAMILY MEDICINE
Payer: MEDICARE

## 2023-04-19 VITALS — SYSTOLIC BLOOD PRESSURE: 120 MMHG | HEART RATE: 72 BPM | TEMPERATURE: 98.4 F | DIASTOLIC BLOOD PRESSURE: 56 MMHG

## 2023-04-19 DIAGNOSIS — L89.309 PRESSURE INJURY OF SKIN OF BUTTOCK, UNSPECIFIED INJURY STAGE, UNSPECIFIED LATERALITY: ICD-10-CM

## 2023-04-19 PROCEDURE — 99215 OFFICE O/P EST HI 40 MIN: CPT | Performed by: FAMILY MEDICINE

## 2023-04-19 PROCEDURE — G0463 HOSPITAL OUTPT CLINIC VISIT: HCPCS | Performed by: FAMILY MEDICINE

## 2023-04-19 NOTE — PATIENT INSTRUCTIONS
Wound care supplies were not ordered or needed today.        Wound Care Instructions    EVERY OTHER DAY and as needed, Cleanse your inner buttock wound(s) with clean tap watr.    Pat Dry with non-sterile gauze    Primary Dressing: Apply your preferred silicone foam into/onto the wounds    It is ok to get your wound wet in the bath or shower    Remember to call to get your cushion mapped.    High Protein Foods  When you have an open ulcer, your bodies protein needs are much higher, so it is recommended eat good sources of protein or take a protein supplement!    Protein Supplements  -Premier Protein  -Ensure  -Boost  -Glucerna, if diabetic    Chicken  -Chicken breast, 3.5oz.-30 grams protein  -Chicken meat, cooked, 4 oz.    Beef  -Hamburger adriana, 4 oz-28 grams protein  -Steak, 6 oz-42 grams  -Most cuts of beef- 7 grams of protein per ounce    Fish  -Most fish fillets or steaks are about 22 grams of protein for 3 1/2 oz(100 grams) of cooked fish, or 6 grams per ounce  -Tuna, 6 oz can-40 grams of protein    Pork  -Pork chop, average-22 grams protein  -Pork loin or tenderloin, 4 oz.-29 grams  -Ham, 3oz serving- 19 grams  -Lucas, 1 slice-3 grams    Eggs and Dairy  -Egg, large-7 grams  -Milk, 1 cup-8 grams  -Cottage cheese, 1/2 cup-15 grams  -Greek yogurt, 1 cup-usually 8-12 grams, check label    Beans  -Soy milk, 1 cup-6-10 grams  -Most beans(black, meng, lentils, etc.) about 7-10 grams protein per half cup of cooked beans    Nuts and Seeds  -Peanut butter, 2 Tablespoons- 8 grams protein  -Almonds, 1/4 cup- 8 grams  -Peanuts, 1/4 cup-9 grams  -Sunflower seeds, 1/4 cup- 6 grams        If for some reason you are not able to get your dressing(s) changed as outlined above (due to illness, lack of supplies, lack of help) please do the following: remove old, soiled dressings; wash the wounds with saline; pat dry; apply ABD pad or other absorbant pad and secure with rolled gauze; avoid tape directly on your skin; Call the  clinic as soon as possible to let us know what the current issues are in receiving wound care [phone].      SEEK MEDICAL CARE IF:  You have an increase in swelling, pain, or redness around the wound.  You have an increase in the amount of pus coming from the wound.  There is a bad smell coming from the wound.  The wound appears to be worsening/enlarging  You have a fever greater than 101.5 F      It is ok to continue current wound care treatment/products for the next 2-3 days until new wound care supplies are ordered and arrive. If longer than this please contact our office at [phone].    If you have a 2 layer or 4 layer compression wrap on these are safe to have on for ONLY 7 days. If for some reason you are not able to get the wrap(s) changed (due to illness; lack of supplies, lack of help, lack of transportation) please do the following: unwrap the old 2 or 4 layer compression wrap; avoid using scissors as you could cut your skin and cause wounds; use tubular compression when available. Call to reschedule your home care or clinic visit appointment as soon as possible.    Please NOTE: if you are 15 minutes late to your clinic appointment you will have to be rescheduled. Please call our clinic as soon as possible if you know you will not be able to get to your appointment at [phone].    If you fail to show up to 3 scheduled clinic appointments you will be dismissed from our clinic.              We want to hear from you!  In the next few weeks, you should receive a call or email to complete a survey about your visit at [facility]. Please help us improve your appointment experience by letting us know how we did today. We strive to make your experience good and value any ways in which we could do better.      We value your input and suggestions.    Thank you for choosing the [facility]!
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
## 2023-04-21 ENCOUNTER — OFFICE VISIT (OUTPATIENT)
Dept: FAMILY MEDICINE | Facility: CLINIC | Age: 73
End: 2023-04-21
Payer: MEDICARE

## 2023-04-21 VITALS
TEMPERATURE: 97.1 F | HEART RATE: 58 BPM | WEIGHT: 216 LBS | SYSTOLIC BLOOD PRESSURE: 108 MMHG | HEIGHT: 66 IN | BODY MASS INDEX: 34.72 KG/M2 | RESPIRATION RATE: 15 BRPM | DIASTOLIC BLOOD PRESSURE: 64 MMHG | OXYGEN SATURATION: 99 %

## 2023-04-21 DIAGNOSIS — E78.5 HYPERLIPIDEMIA LDL GOAL <100: ICD-10-CM

## 2023-04-21 DIAGNOSIS — Z23 NEED FOR DIPHTHERIA-TETANUS-PERTUSSIS (TDAP) VACCINE: ICD-10-CM

## 2023-04-21 DIAGNOSIS — Z79.4 TYPE 2 DIABETES MELLITUS WITH DIABETIC NEUROPATHY, WITH LONG-TERM CURRENT USE OF INSULIN (H): ICD-10-CM

## 2023-04-21 DIAGNOSIS — E11.40 TYPE 2 DIABETES MELLITUS WITH DIABETIC NEUROPATHY, WITH LONG-TERM CURRENT USE OF INSULIN (H): ICD-10-CM

## 2023-04-21 DIAGNOSIS — N18.6 ESRD (END STAGE RENAL DISEASE) ON DIALYSIS (H): Primary | ICD-10-CM

## 2023-04-21 DIAGNOSIS — S63.616A SPRAIN OF RIGHT LITTLE FINGER, INITIAL ENCOUNTER: ICD-10-CM

## 2023-04-21 DIAGNOSIS — Z99.2 ESRD (END STAGE RENAL DISEASE) ON DIALYSIS (H): Primary | ICD-10-CM

## 2023-04-21 LAB
ALBUMIN SERPL BCG-MCNC: 4.1 G/DL (ref 3.5–5.2)
ALP SERPL-CCNC: 56 U/L (ref 35–104)
ALT SERPL W P-5'-P-CCNC: 21 U/L (ref 10–35)
AST SERPL W P-5'-P-CCNC: 19 U/L (ref 10–35)
BILIRUB DIRECT SERPL-MCNC: <0.2 MG/DL (ref 0–0.3)
BILIRUB SERPL-MCNC: 0.2 MG/DL
CHOLEST SERPL-MCNC: 113 MG/DL
HBA1C MFR BLD: 6.8 % (ref 0–5.6)
HDLC SERPL-MCNC: 50 MG/DL
LDLC SERPL CALC-MCNC: 39 MG/DL
NONHDLC SERPL-MCNC: 63 MG/DL
PROT SERPL-MCNC: 7.4 G/DL (ref 6.4–8.3)
TRIGL SERPL-MCNC: 122 MG/DL
TSH SERPL DL<=0.005 MIU/L-ACNC: 2.36 UIU/ML (ref 0.3–4.2)

## 2023-04-21 PROCEDURE — 80076 HEPATIC FUNCTION PANEL: CPT | Performed by: FAMILY MEDICINE

## 2023-04-21 PROCEDURE — 84443 ASSAY THYROID STIM HORMONE: CPT | Performed by: FAMILY MEDICINE

## 2023-04-21 PROCEDURE — 90715 TDAP VACCINE 7 YRS/> IM: CPT | Performed by: FAMILY MEDICINE

## 2023-04-21 PROCEDURE — 90471 IMMUNIZATION ADMIN: CPT | Performed by: FAMILY MEDICINE

## 2023-04-21 PROCEDURE — 99214 OFFICE O/P EST MOD 30 MIN: CPT | Mod: 25 | Performed by: FAMILY MEDICINE

## 2023-04-21 PROCEDURE — 80061 LIPID PANEL: CPT | Performed by: FAMILY MEDICINE

## 2023-04-21 PROCEDURE — 83036 HEMOGLOBIN GLYCOSYLATED A1C: CPT | Performed by: FAMILY MEDICINE

## 2023-04-21 PROCEDURE — 36415 COLL VENOUS BLD VENIPUNCTURE: CPT | Performed by: FAMILY MEDICINE

## 2023-04-21 ASSESSMENT — PATIENT HEALTH QUESTIONNAIRE - PHQ9
10. IF YOU CHECKED OFF ANY PROBLEMS, HOW DIFFICULT HAVE THESE PROBLEMS MADE IT FOR YOU TO DO YOUR WORK, TAKE CARE OF THINGS AT HOME, OR GET ALONG WITH OTHER PEOPLE: NOT DIFFICULT AT ALL
SUM OF ALL RESPONSES TO PHQ QUESTIONS 1-9: 3
SUM OF ALL RESPONSES TO PHQ QUESTIONS 1-9: 3

## 2023-04-21 NOTE — PROGRESS NOTES
"  Assessment & Plan     ESRD (end stage renal disease) on dialysis (H)  Well controlled   Follow up with consultant as planned.   - Hepatic panel (Albumin, ALT, AST, Bili, Alk Phos, TP)  - Hepatic panel (Albumin, ALT, AST, Bili, Alk Phos, TP)    Sprain of right little finger, initial encounter  Better   rest/ marycruz tape    Hyperlipidemia LDL goal <100  Recheck lipids    Need for diphtheria-tetanus-pertussis (Tdap) vaccine  done  - TDAP VACCINE (Adacel, Boostrix)  [[id number]]    Type 2 diabetes mellitus with diabetic neuropathy, with long-term current use of insulin (H)  Well controlled   [id number] 6.0  - Lipid panel reflex to direct LDL Non-fasting  - TSH WITH FREE T4 REFLEX  - Hemoglobin A1c  - Lipid panel reflex to direct LDL Non-fasting  - TSH WITH FREE T4 REFLEX      Review of external notes as documented elsewhere in note  Review of the result(s) of each unique test - A1C  12 minutes spent by me on the date of the encounter doing review of outside records, review of test results and interpretation of tests        BMI:   Estimated body mass index is 34.86 kg/m  as calculated from the following:    Height as of this encounter: 1.676 m (5' 6\").    Weight as of this encounter: 98 kg (216 lb).       See Patient Instructions    Noam Jackson MD  [facility]    Jean Carlos Epps is a 73 year old, presenting for the following health issues:    Finger (Trigger finger-pinky finger on R hand), Facial Swelling (Right side-believes from dentures), and Hypertension (Bottom number has been low (in the 50s) last few times checked in clinic per daughter)        4/21/2023     1:09 PM   Additional Questions   Roomed by Myriam   Accompanied by Daughter     History of Present Illness       Reason for visit:  Trigger finger  Symptom onset:  1-2 weeks ago    She eats 2-3 servings of fruits and vegetables daily.She consumes 0 sweetened beverage(s) daily.She exercises with enough effort to increase her " "heart rate 9 or less minutes per day.  She exercises with enough effort to increase her heart rate 4 days per week.   She is taking medications regularly.    Today's PHQ-9         PHQ-9 Total Score: 3    PHQ-9 Q9 Thoughts of better off dead/self-harm past 2 weeks :   Not at all    How difficult have these problems made it for you to do your work, take care of things at home, or get along with other people: Not difficult at all               Review of Systems   Constitutional, HEENT, cardiovascular, pulmonary, gi and gu systems are negative, except as otherwise noted.      Objective    /64 (BP Location: Right arm, Patient Position: Sitting, Cuff Size: Adult Large)   Pulse 58   Temp 97.1  F (36.2  C) (Temporal)   Resp 15   Ht 1.676 m (5' 6\")   Wt 98 kg (216 lb)   SpO2 99%   BMI 34.86 kg/m    Body mass index is 34.86 kg/m .  Physical Exam   GENERAL: alert, no distress and fatigued  NECK: no adenopathy, no asymmetry, masses, or scars and thyroid normal to palpation  RESP: lungs clear to auscultation - no rales, rhonchi or wheezes  CV: regular rates and rhythm and normal S1 S2, no S3 or S4  MS: normal muscle tone and tenderness to palpation flexer tendon right 5th finger with normal ROM  SKIN: no suspicious lesions or rashes  PSYCH: mentation appears normal, affect normal/bright    Admission on 01/31/2023, Discharged on 01/31/2023   Component Date Value Ref Range Status     Ventricular Rate 01/31/2023 63  BPM Final     Atrial Rate 01/31/2023 63  BPM Final     WI Interval 01/31/2023 146  ms Final     QRS Duration 01/31/2023 84  ms Final     QT 01/31/2023 434  ms Final     QTc 01/31/2023 444  ms Final     P Axis 01/31/2023 26  degrees Final     R AXIS 01/31/2023 39  degrees Final     T [place] [facility] 01/31/2023 63  degrees Final     Interpretation ECG 01/31/2023    Final                    Value:Sinus rhythm with Premature atrial complexes  Nonspecific ST abnormality  Abnormal ECG  When compared with ECG of 23-NOV-2021 " 06:52,  Sinus rhythm has replaced Junctional rhythm  Confirmed by GENERATED REPORT, COMPUTER ([id number]),  Jade Zacarias ([id number]) on 1/31/2023 10:05:12 PM       Sodium 01/31/2023 135 (L)  136 - 145 mmol/L Final     Potassium 01/31/2023 4.4  3.4 - 5.3 mmol/L Final    Specimen slightly hemolyzed, potassium may be falsely elevated.     Chloride 01/31/2023 98  98 - 107 mmol/L Final     Carbon Dioxide (CO2) 01/31/2023 25  22 - 29 mmol/L Final     Anion Gap 01/31/2023 12  7 - 15 mmol/L Final     Urea Nitrogen 01/31/2023 21.6  8.0 - 23.0 mg/dL Final     Creatinine 01/31/2023 2.87 (H)  0.51 - 0.95 mg/dL Final     Calcium 01/31/2023 9.4  8.8 - 10.2 mg/dL Final     Glucose 01/31/2023 194 (H)  70 - 99 mg/dL Final     GFR Estimate 01/31/2023 17 (L)  >60 mL/min/1.73m2 Final    eGFR calculated using 2021 CKD-EPI equation.     WBC Count 01/31/2023 9.2  4.0 - 11.0 10e3/uL Final     RBC Count 01/31/2023 2.94 (L)  3.80 - 5.20 10e6/uL Final     Hemoglobin 01/31/2023 9.7 (L)  11.7 - 15.7 g/dL Final     Hematocrit 01/31/2023 29.7 (L)  35.0 - 47.0 % Final     MCV 01/31/2023 101 (H)  78 - 100 fL Final     MCH 01/31/2023 33.0  26.5 - 33.0 pg Final     MCHC 01/31/2023 32.7  31.5 - 36.5 g/dL Final     RDW 01/31/2023 12.1  10.0 - 15.0 % Final     Platelet Count 01/31/2023 170  150 - 450 10e3/uL Final     % Neutrophils 01/31/2023 80  % Final     % Lymphocytes 01/31/2023 11  % Final     % Monocytes 01/31/2023 8  % Final     % Eosinophils 01/31/2023 1  % Final     % Basophils 01/31/2023 0  % Final     % Immature Granulocytes 01/31/2023 0  % Final     NRBCs per 100 WBC 01/31/2023 0  <1 /100 Final     Absolute Neutrophils 01/31/2023 7.3  1.6 - 8.3 10e3/uL Final     Absolute Lymphocytes 01/31/2023 1.1  0.8 - 5.3 10e3/uL Final     Absolute Monocytes 01/31/2023 0.7  0.0 - 1.3 10e3/uL Final     Absolute Eosinophils 01/31/2023 0.1  0.0 - 0.7 10e3/uL Final     Absolute Basophils 01/31/2023 0.0  0.0 - 0.2 10e3/uL Final     Absolute Immature  Granulocytes 01/31/2023 0.0  <=0.4 10e3/uL Final     Absolute NRBCs 01/31/2023 0.0  10e3/uL Final

## 2023-04-26 ENCOUNTER — OFFICE VISIT (OUTPATIENT)
Dept: OPHTHALMOLOGY | Facility: CLINIC | Age: 73
End: 2023-04-26
Attending: OPHTHALMOLOGY
Payer: MEDICARE

## 2023-04-26 DIAGNOSIS — E11.3213 TYPE 2 DIABETES MELLITUS WITH MILD NONPROLIFERATIVE RETINOPATHY OF BOTH EYES AND MACULAR EDEMA, UNSPECIFIED WHETHER LONG TERM INSULIN USE (H): ICD-10-CM

## 2023-04-26 PROCEDURE — 92134 CPTRZ OPH DX IMG PST SGM RTA: CPT | Performed by: OPHTHALMOLOGY

## 2023-04-26 PROCEDURE — G0463 HOSPITAL OUTPT CLINIC VISIT: HCPCS | Mod: 25 | Performed by: OPHTHALMOLOGY

## 2023-04-26 PROCEDURE — 99214 OFFICE O/P EST MOD 30 MIN: CPT | Performed by: OPHTHALMOLOGY

## 2023-04-26 ASSESSMENT — REFRACTION_WEARINGRX
OS_ADD: +2.50
OS_CYLINDER: +1.75
OD_SPHERE: -1.00
OS_SPHERE: -4.00
SPECS_TYPE: BIFOCAL
OS_AXIS: 130
OD_AXIS: 072
OD_CYLINDER: +1.00
OD_ADD: +2.50

## 2023-04-26 ASSESSMENT — CUP TO DISC RATIO
OS_RATIO: 0.3
OD_RATIO: 0.3

## 2023-04-26 ASSESSMENT — SLIT LAMP EXAM - LIDS
COMMENTS: NORMAL
COMMENTS: NORMAL

## 2023-04-26 ASSESSMENT — VISUAL ACUITY
CORRECTION_TYPE: GLASSES
OS_CC+: -2
METHOD: SNELLEN - LINEAR
OS_CC: 20/25
OD_CC+: +1
OD_CC: 20/40

## 2023-04-26 ASSESSMENT — TONOMETRY
IOP_METHOD: APPLANATION
OD_IOP_MMHG: 13
OS_IOP_MMHG: 14

## 2023-04-26 ASSESSMENT — EXTERNAL EXAM - RIGHT EYE: OD_EXAM: NORMAL

## 2023-04-26 NOTE — PROGRESS NOTES
CC: follow up  NPDR and DME.   HPI: 73 year old female with history of  NPDR and DME.   Interim: Right eye improved vision.  No new flashes or floaters; no eye pain  Imaging:  Optical Coherence Tomography: 04/26/23   right eye: central stable lamellar hole. No IRF, choroid normal, hyaloid . stable  Left eye: irregular fovea, irregular inner retinal contour, mild dispersed pockets of IRF rashid nasally, microaneurysms. stable    fluorescein angiography: 12/14/22   Right eye: blockage of fluorescein angiography on the areas of heme; few microaneurysms; mild late Diabetic macular edema and PP leakage  Left eye: few microaneurysms; mild late Diabetic macular edema; staining of the peripheral Chorioretinal  scars  Stable  No neovascularization elsewhere; no neovascularization of the disc.    Assessment & Plan:    # Moderate nonproliferative diabetic retinopathy both eyes   - stable.    # mild Diabetic macular edema both eyes  - stable    # H/o right eye - retinal macroaneurysm    - at the sup temp margin of disc may contribute to macular edema;    - status post avastin inj x2 for cystoid macular edema with improvement   - No longer present- previously observed to be thrombosing   - Last DOROTEO 8/15/19 (#1)    #. Mod Hypertensive retinopathy   - Stage 5 kidney disease   - blood pressure control has been poor in 160s/90s-100s  - improved control per patient recently     # Pseudophakia both eyes  8-11-20 right eye   8-17-22 YAG OD    #. History of Macula hole repair left eye by Dr. Daniel  S/p PPV, mp, air-fluid exchange, SF6 gas infusion, left eye 2/14/2012 by Dr. Daniel    - residual lamellar hole, but outer retina closed  08/17/22  Peripheral Chorioretinal  Scars with  IN area of shallow elevation anterior to the scars. questionable shallow SRF with demarcation line, possible old laser and not extending posteriorly.   These are chronic findings and present in prior optos photos from 2019.   Stable, unchanged  04/26/23   Will monitor.      # Dry eye syndrome   Status post punctal plugs   artificial tears  As needed and warm compresses     PLAN:   - Blood pressure (<120/80) and blood glucose (HbA1c <7.0) control discussed with patient.   - Patient advised that failure to adequately control each may lead to vision loss. The patient expressed understanding.  - follow up in 6 months with Optical Coherence Tomography and fluorescein angiography transits right eye   ~~~~~~~~~~~~~~~~~~~~~~~~~~~~~~~~~~   Complete documentation of historical and exam elements from today's encounter can be found in the full encounter summary report (not reduplicated in this progress note).  I personally obtained the chief complaint(s) and history of present illness.  I confirmed and edited as necessary the review of systems, past medical/surgical history, family history, social history, and examination findings as documented by others; and I examined the patient myself.  I personally reviewed the relevant tests, images, and reports as documented above.  I formulated and edited as necessary the assessment and plan and discussed the findings and management plan with the patient and family    Leanne Jett MD   of Ophthalmology.  Retina Service   Department of Ophthalmology and Visual Neurosciences   [facility]  Phone: [phone]   Fax: [phone]

## 2023-04-26 NOTE — NURSING NOTE
Chief Complaints and History of Present Illnesses   Patient presents with     Follow Up       Chief Complaint(s) and History of Present Illness(es)     Follow Up           Comments    Patient is here for follow up of diabetic retinopathy with OCT.  She notes her vision has been stable in both eyes.  She complains of both eyes tearing, possibly allergies.   Using Ats occasionally.                        LETICIA Gomez  3:31 PM 04/26/2023

## 2023-04-27 ENCOUNTER — TELEPHONE (OUTPATIENT)
Dept: TRANSPLANT | Facility: CLINIC | Age: 73
End: 2023-04-27
Payer: MEDICARE

## 2023-04-27 DIAGNOSIS — N18.6 ESRD (END STAGE RENAL DISEASE) ON DIALYSIS (H): Primary | ICD-10-CM

## 2023-04-27 DIAGNOSIS — Z99.2 ESRD (END STAGE RENAL DISEASE) ON DIALYSIS (H): Primary | ICD-10-CM

## 2023-04-27 RX ORDER — FUROSEMIDE 40 MG
40 TABLET ORAL DAILY
Qty: 60 TABLET | Refills: 11 | Status: SHIPPED | OUTPATIENT
Start: 2023-04-27 | End: 2023-06-19

## 2023-04-27 NOTE — TELEPHONE ENCOUNTER
Called Caroline, pts daughter, to check in on how things are going and if pt is still working with PT. Left  with direct line for return call.     4/28/23 addendum: Received voicemail from pts daughter. Pt is working with PT and has appt set up next week for prothesis. Overall is doing pretty well.     Returned call to Caroline. Updated will bring pt in for surgeon visit once pt has been cleared by PT with prothesis. Left  with direct line for return call.

## 2023-05-01 ENCOUNTER — DOCUMENTATION ONLY (OUTPATIENT)
Dept: PHYSICAL MEDICINE AND REHAB | Facility: CLINIC | Age: 73
End: 2023-05-01
Payer: MEDICARE

## 2023-05-01 DIAGNOSIS — S78.112A ABOVE KNEE AMPUTATION OF LEFT LOWER EXTREMITY (H): Primary | ICD-10-CM

## 2023-05-03 ENCOUNTER — HOSPITAL ENCOUNTER (OUTPATIENT)
Dept: PHYSICAL THERAPY | Facility: CLINIC | Age: 73
Setting detail: THERAPIES SERIES
Discharge: HOME | End: 2023-05-03
Attending: PHYSICAL MEDICINE & REHABILITATION
Payer: MEDICARE

## 2023-05-03 PROCEDURE — 97110 THERAPEUTIC EXERCISES: CPT | Mod: GP | Performed by: PHYSICAL THERAPIST

## 2023-05-07 DIAGNOSIS — Z79.4 TYPE 2 DIABETES MELLITUS WITH HYPERGLYCEMIA, WITH LONG-TERM CURRENT USE OF INSULIN (H): ICD-10-CM

## 2023-05-07 DIAGNOSIS — E11.65 TYPE 2 DIABETES MELLITUS WITH HYPERGLYCEMIA, WITH LONG-TERM CURRENT USE OF INSULIN (H): ICD-10-CM

## 2023-05-10 ENCOUNTER — HOSPITAL ENCOUNTER (OUTPATIENT)
Dept: PHYSICAL THERAPY | Facility: CLINIC | Age: 73
Setting detail: THERAPIES SERIES
Discharge: HOME | End: 2023-05-10
Attending: PHYSICAL MEDICINE & REHABILITATION
Payer: MEDICARE

## 2023-05-10 PROCEDURE — 97530 THERAPEUTIC ACTIVITIES: CPT | Mod: GP | Performed by: PHYSICAL THERAPIST

## 2023-05-10 NOTE — DISCHARGE INSTRUCTIONS
5/10/23    Caroline to look at pant, underwear material.  Want cotton or breathable material.    In the middle of dialysis - lie flat for 5 to 10 minutes to take weight off bottom.     Try to lie on sides at night.  Some of the time.    Thanks  shanti MAY

## 2023-05-11 ENCOUNTER — TELEPHONE (OUTPATIENT)
Dept: FAMILY MEDICINE | Facility: CLINIC | Age: 73
End: 2023-05-11
Payer: MEDICARE

## 2023-05-11 DIAGNOSIS — L89.300 PRESSURE INJURY OF BUTTOCK, UNSTAGEABLE, UNSPECIFIED LATERALITY (H): ICD-10-CM

## 2023-05-11 DIAGNOSIS — Z79.4 TYPE 2 DIABETES MELLITUS WITH DIABETIC NEUROPATHY, WITH LONG-TERM CURRENT USE OF INSULIN (H): Primary | ICD-10-CM

## 2023-05-11 DIAGNOSIS — E11.40 TYPE 2 DIABETES MELLITUS WITH DIABETIC NEUROPATHY, WITH LONG-TERM CURRENT USE OF INSULIN (H): Primary | ICD-10-CM

## 2023-05-11 NOTE — TELEPHONE ENCOUNTER
"----- Message from Dorene Boss, PT sent at 5/10/2023  1:28 PM CDT -----  Regarding: OT seating and pressure map appt  DR Jackson-   Hi  I am seeing Supriya in PT - she needs orders for an OT seating appt with a specific note for \"pressure mapping\" for her L buttocks wound/ issue    Please write this order and then her dtr Caroline can set it up - it is at my [facility] outpt clinic.     Thanks.  Dorene Boss PT    [email or web address]  [address], [address]  [place], [place]  [phone]        "

## 2023-05-17 ENCOUNTER — THERAPY VISIT (OUTPATIENT)
Dept: PHYSICAL THERAPY | Facility: CLINIC | Age: 73
End: 2023-05-17
Attending: PHYSICAL MEDICINE & REHABILITATION
Payer: MEDICARE

## 2023-05-17 DIAGNOSIS — S78.112A ABOVE KNEE AMPUTATION OF LEFT LOWER EXTREMITY (H): Primary | ICD-10-CM

## 2023-05-17 PROCEDURE — 97530 THERAPEUTIC ACTIVITIES: CPT | Mod: GP | Performed by: PHYSICAL THERAPIST

## 2023-05-17 PROCEDURE — 97110 THERAPEUTIC EXERCISES: CPT | Mod: GP | Performed by: PHYSICAL THERAPIST

## 2023-05-18 DIAGNOSIS — I10 HYPERTENSION GOAL BP (BLOOD PRESSURE) < 140/90: ICD-10-CM

## 2023-05-18 RX ORDER — AMLODIPINE BESYLATE 5 MG/1
TABLET ORAL
Qty: 180 TABLET | Refills: 3 | Status: SHIPPED | OUTPATIENT
Start: 2023-05-18 | End: 2024-02-27

## 2023-05-18 NOTE — PROGRESS NOTES
DEPARTMENT OF HEALTH AND HUMAN SERVICES  CENTERS FOR MEDICARE & MEDICAID SERVICES    PLAN/UPDATED PLAN OF PROGRESS FOR OUTPATIENT REHABILITATION          PATIENTS NAME:  Supriya Herr     : 1950    PROVIDER NUMBER:    [id number]    HICN:  [id number]     [facility]    MEDICAL RECORD NUMBER: [id number]     START OF CARE DATE:      TYPE:  PT    PRIMARY/TREATMENT DIAGNOSIS: (Pertinent Medical Diagnosis)  Data Unavailable    VISITS FROM START OF CARE:           23 0500   Appointment Info   Signing clinician's name / credentials Jonny PT   Visits Used 19   GOALS   PT Goals 4   PT Goal 1   Goal Identifier Standing tolerance   Goal Description Pt will be able to tolerate standing >5 min in parallel bars w/ single UE support in order to build weightbearing tolerance of R LE   Rationale to maximize safety and independence with performance of ADLs and functional tasks   Goal Progress eval: able to  // bars with heavy B UE support x30 sec.  2/15/23  able to stand 3x today less pressure on bars. sba to cga.   Target Date 23   PT Goal 2   Goal Identifier Transfers   Goal Description Pt will demonstrate proper mechanics of stand pivot transfer with walker from chair<>mat in order to improve safety with transfers at home   Goal Progress eval: see transfers in eval for description. 2/15/23 pt can do a squat pivot tx w/ sba but not stand pivot yet   Target Date 23   PT Goal 3   Goal Identifier HEP   Goal Description Pt will be inependent with HEP at least 4x/week in order to improve self management of symptoms   Target Date 23   Date Met 02/15/23   PT Goal 4   Goal Identifier prosthetic   Goal Description pt to walk 20ft w/ walker and new prosthetic L w/ supervision in home setting   Goal Progress 23   Subjective Report   Subjective Report right baby finger.  tirigger.  dr chowdhury said to tape it. no pain.   Treatment  "Interventions (PT)   Interventions Therapeutic Procedure/Exercise;Therapeutic Activity;Gait Training   Therapeutic Procedure/Exercise   Therapeutic Procedures: strength, endurance, ROM, flexibillity minutes (51972) 14   Ther Proc 1 nustep   Ther Proc 1 - Details kamar 10 min on nustep w/ set up.  0.24 miles. set up req'd  level 2.  R leg and bilat ue's.   Therapeutic Activity   Therapeutic Activities: dynamic activities to improve functional performance minutes (19304) 29   Patient Response/Progress also educ on what to expect for getting new prosthetic, skin checks.   Ther Act 1 standing   Ther Act 1 - Details pt kamar standing x 6 today in //bars. 1 min to 70 sec each time. on last 3 stands pt able to take 3 to  4 steps each time w/ \"hop\" motion, coming down hard on R foot.  kamar well as this is an improvemnet.   Education   Learner/Method Patient;Family   Education Comments cont HEP   Plan   Homework HEP   Home program reciprocal inhibition stretch of L hip flexor; cont to stand at kitchen counter.   seated w/c pushups.  ap's / laq on R, hip flexion on L seated.   bilat sidelying hip ext and supine hip abduction bilat done one at a time.  seated leg press w/ red band.   Plan for next session getting prosthetic soon - start standing/prosthetic training in //bars   Total Session Time   Timed Code Treatment Minutes 43   Total Treatment Time (sum of timed and untimed services) 43           Caregiver Signature/Credentials __Dorene Boss PT 5/18/23___________________________ Date 5/18/23 ________           I have reviewed and certified the need for these services and plan of treatment while under my care.        PHYSICIAN'S SIGNATURE:   _________________________________________  Date___________   Kamryn Lazo    Certification period:   5/17/23 to    8/8/23    Functional Level Progress Report: Please see attached \"Goal Flow sheet for Functional level.\"    ____X____ Continue Services or       ________ DC Services            "     Service dates: From 3/5/23  date to present

## 2023-05-18 NOTE — PROGRESS NOTES
05/17/23 0500   Appointment Info   Signing clinician's name / credentials Jonny PT   Visits Used 19   GOALS   PT Goals 4   PT Goal 1   Goal Identifier Standing tolerance   Goal Description Pt will be able to tolerate standing >5 min in parallel bars w/ single UE support in order to build weightbearing tolerance of R LE   Rationale to maximize safety and independence with performance of ADLs and functional tasks   Goal Progress eval: able to  // bars with heavy B UE support x30 sec.  2/15/23  able to stand 3x today less pressure on bars. sba to cga.   Target Date 08/08/23   PT Goal 2   Goal Identifier Transfers   Goal Description Pt will demonstrate proper mechanics of stand pivot transfer with walker from chair<>mat in order to improve safety with transfers at home   Goal Progress eval: see transfers in eval for description. 2/15/23 pt can do a squat pivot tx w/ sba but not stand pivot yet   Target Date 08/08/23   PT Goal 3   Goal Identifier HEP   Goal Description Pt will be inependent with HEP at least 4x/week in order to improve self management of symptoms   Target Date 03/08/23   Date Met 02/15/23   PT Goal 4   Goal Identifier prosthetic   Goal Description pt to walk 20ft w/ walker and new prosthetic L w/ supervision in home setting   Goal Progress 8/8/23   Subjective Report   Subjective Report right baby finger.  tirigger.  dr chowdhury said to tape it. no pain.   Treatment Interventions (PT)   Interventions Therapeutic Procedure/Exercise;Therapeutic Activity;Gait Training   Therapeutic Procedure/Exercise   Therapeutic Procedures: strength, endurance, ROM, flexibillity minutes (04427) 14   Ther Proc 1 nustep   Ther Proc 1 - Details kamar 10 min on nustep w/ set up.  0.24 miles. set up req'd  level 2.  R leg and bilat ue's.   Therapeutic Activity   Therapeutic Activities: dynamic activities to improve functional performance minutes (62298) 29   Patient Response/Progress also educ on what to expect  "for getting new prosthetic, skin checks.   Ther Act 1 standing   Ther Act 1 - Details pt kamar standing x 6 today in //bars. 1 min to 70 sec each time. on last 3 stands pt able to take 3 to  4 steps each time w/ \"hop\" motion, coming down hard on R foot.  kamar well as this is an improvemnet.   Education   Learner/Method Patient;Family   Education Comments cont HEP   Plan   Homework HEP   Home program reciprocal inhibition stretch of L hip flexor; cont to stand at kitchen counter.   seated w/c pushups.  ap's / laq on R, hip flexion on L seated.   bilat sidelying hip ext and supine hip abduction bilat done one at a time.  seated leg press w/ red band.   Plan for next session getting prosthetic soon - start standing/prosthetic training in //bars   Total Session Time   Timed Code Treatment Minutes 43   Total Treatment Time (sum of timed and untimed services) 43       "

## 2023-05-23 ENCOUNTER — MYC MEDICAL ADVICE (OUTPATIENT)
Dept: FAMILY MEDICINE | Facility: CLINIC | Age: 73
End: 2023-05-23
Payer: MEDICARE

## 2023-05-23 DIAGNOSIS — E55.9 VITAMIN D DEFICIENCY: ICD-10-CM

## 2023-05-23 RX ORDER — CHOLECALCIFEROL (VITAMIN D3) 50 MCG
50 TABLET ORAL DAILY
Qty: 100 TABLET | Refills: 3 | Status: SHIPPED | OUTPATIENT
Start: 2023-05-23 | End: 2024-02-27

## 2023-05-23 NOTE — TELEPHONE ENCOUNTER
"Requested Prescriptions   Pending Prescriptions Disp Refills     vitamin D3 (CHOLECALCIFEROL) 50 mcg (2000 units) tablet 100 tablet 3     Sig: Take 1 tablet (50 mcg) by mouth daily       Vitamin Supplements (Adult) Protocol Passed - 5/23/2023  8:57 AM        Passed - High dose Vitamin D not ordered        Passed - Recent (12 mo) or future (30 days) visit within the authorizing provider's specialty     Patient has had an office visit with the authorizing provider or a provider within the authorizing providers department within the previous 12 mos or has a future within next 30 days. See \"Patient Info\" tab in inbasket, or \"Choose Columns\" in Meds & Orders section of the refill encounter.              Passed - Medication is active on med list           Prescription approved per [facility] Refill Protocol.    Dionne Lei RN  [facility]   "

## 2023-05-23 NOTE — TELEPHONE ENCOUNTER
Pt sent a My chart message requesting a refill.    Dionne Lei RN  [facility]

## 2023-06-05 DIAGNOSIS — L40.8 INVERSE PSORIASIS: ICD-10-CM

## 2023-06-08 RX ORDER — APREMILAST 30 MG/1
TABLET, FILM COATED ORAL
Qty: 90 TABLET | Refills: 3 | Status: SHIPPED | OUTPATIENT
Start: 2023-06-08 | End: 2024-07-12

## 2023-06-08 NOTE — TELEPHONE ENCOUNTER
apremilast (OTEZLA) 30 MG tablet 90 tablet 3 6/3/2022         Last Written Prescription Date:  6-3-2022  Last Fill Quantity: 90,   # refills: 3  Last Office Visit : 6-3-2022  Future Office visit:  6-    Routing refill request to provider for review/approval because:  Not on protocol.      Kathleen M Doege RN

## 2023-06-10 DIAGNOSIS — E11.40 TYPE 2 DIABETES MELLITUS WITH DIABETIC NEUROPATHY, WITH LONG-TERM CURRENT USE OF INSULIN (H): ICD-10-CM

## 2023-06-10 DIAGNOSIS — D63.1 ANEMIA DUE TO STAGE 5 CHRONIC KIDNEY DISEASE, NOT ON CHRONIC DIALYSIS (H): ICD-10-CM

## 2023-06-10 DIAGNOSIS — N18.5 CKD (CHRONIC KIDNEY DISEASE) STAGE 5, GFR LESS THAN 15 ML/MIN (H): ICD-10-CM

## 2023-06-10 DIAGNOSIS — Z79.4 TYPE 2 DIABETES MELLITUS WITH HYPERGLYCEMIA, WITH LONG-TERM CURRENT USE OF INSULIN (H): ICD-10-CM

## 2023-06-10 DIAGNOSIS — Z79.4 TYPE 2 DIABETES MELLITUS WITH DIABETIC NEUROPATHY, WITH LONG-TERM CURRENT USE OF INSULIN (H): ICD-10-CM

## 2023-06-10 DIAGNOSIS — N18.5 ANEMIA DUE TO STAGE 5 CHRONIC KIDNEY DISEASE, NOT ON CHRONIC DIALYSIS (H): ICD-10-CM

## 2023-06-10 DIAGNOSIS — E11.65 TYPE 2 DIABETES MELLITUS WITH HYPERGLYCEMIA, WITH LONG-TERM CURRENT USE OF INSULIN (H): ICD-10-CM

## 2023-06-12 RX ORDER — INSULIN ASPART 100 [IU]/ML
INJECTION, SOLUTION INTRAVENOUS; SUBCUTANEOUS
Qty: 15 ML | Refills: 1 | Status: SHIPPED | OUTPATIENT
Start: 2023-06-12 | End: 2023-09-18

## 2023-06-12 RX ORDER — SEVELAMER CARBONATE 800 MG/1
TABLET, FILM COATED ORAL
Qty: 270 TABLET | Refills: 3 | Status: SHIPPED | OUTPATIENT
Start: 2023-06-12 | End: 2024-02-15

## 2023-06-12 RX ORDER — INSULIN GLARGINE 100 [IU]/ML
INJECTION, SOLUTION SUBCUTANEOUS
Qty: 15 ML | Refills: 3 | Status: SHIPPED | OUTPATIENT
Start: 2023-06-12

## 2023-06-12 RX ORDER — FUROSEMIDE 80 MG
80 TABLET ORAL 2 TIMES DAILY
Qty: 180 TABLET | Refills: 3 | Status: SHIPPED | OUTPATIENT
Start: 2023-06-12 | End: 2024-01-30

## 2023-06-12 NOTE — TELEPHONE ENCOUNTER
"Requested Prescriptions   Pending Prescriptions Disp Refills     insulin glargine (BASAGLAR KWIKPEN) 100 UNIT/ML pen [Pharmacy Med Name: BASAGLAR 100 UNIT/ML KWIKPEN]  3     Sig: INJECT 18 UNITS SUBCUTANEOUS DAILY.       Long Acting Insulin Protocol Passed - 6/10/2023 11:16 AM        Passed - Serum creatinine on file in past 12 months     Recent Labs   Lab Test 01/31/23  1651   CR 2.87*       Ok to refill medication if creatinine is low          Passed - HgbA1C in past 3 or 6 months     If HgbA1C is 8 or greater, it needs to be on file within the past 3 months.  If less than 8, must be on file within the past 6 months.     Recent Labs   Lab Test 04/21/23  1405 04/09/21  1456 02/14/20  0000   A1C 6.8*   < >  --    HEMOGLOBINA1  --   --  5.2    < > = values in this interval not displayed.             Passed - Medication is active on med list        Passed - Patient is age 18 or older        Passed - Recent (6 mo) or future (30 days) visit within the authorizing provider's specialty     Patient had office visit in the last 6 months or has a visit in the next 30 days with authorizing provider or within the authorizing provider's specialty.  See \"Patient Info\" tab in inbasket, or \"Choose Columns\" in Meds & Orders section of the refill encounter.               insulin aspart (NOVOLOG FLEXPEN) 100 UNIT/ML pen [Pharmacy Med Name: NOVOLOG 100 UNIT/ML FLEXPEN]  1     Sig: INJECT SUBCUTANEOUSLY 5 UNITS WITH BREAKFAST, LUNCH AND DINNER. TOTAL DAILY DOSE 12 UNITS.       Short Acting Insulin Protocol Passed - 6/10/2023 11:16 AM        Passed - Serum creatinine on file in past 12 months     Recent Labs   Lab Test 01/31/23  1651   CR 2.87*       Ok to refill medication if creatinine is low          Passed - HgbA1C in past 3 or 6 months     If HgbA1C is 8 or greater, it needs to be on file within the past 3 months.  If less than 8, must be on file within the past 6 months.     Recent Labs   Lab Test 04/21/23  1405 04/09/21  1456 " "02/14/20  0000   A1C 6.8*   < >  --    HEMOGLOBINA1  --   --  5.2    < > = values in this interval not displayed.             Passed - Medication is active on med list        Passed - Patient is age 18 or older        Passed - Recent (6 mo) or future (30 days) visit within the authorizing provider's specialty     Patient had office visit in the last 6 months or has a visit in the next 30 days with authorizing provider or within the authorizing provider's specialty.  See \"Patient Info\" tab in inbasket, or \"Choose Columns\" in Meds & Orders section of the refill encounter.             Prescription approved per [facility] Refill Protocol.    Pt was last seen on 04/21/23    Dionne Lei RN  [facility]   "

## 2023-06-12 NOTE — TELEPHONE ENCOUNTER
"Requested Prescriptions   Pending Prescriptions Disp Refills     RENVELA 800 MG tablet [Pharmacy Med Name: RENVELA 800 MG TABLET] 270 tablet 3     Sig: TAKE TWO TABLETS WITH MEALS AND 1 TABLET WITH SNACKS       There is no refill protocol information for this order        furosemide (LASIX) 80 MG tablet [Pharmacy Med Name: FUROSEMIDE 80 MG TABLET] 180 tablet 3     Sig: TAKE 1 TABLET (80 MG) BY MOUTH 2 TIMES DAILY       Diuretics (Including Combos) Protocol Failed - 6/10/2023 11:16 AM        Failed - Recent (12 mo) or future (30 days) visit within the authorizing provider's specialty     Patient has had an office visit with the authorizing provider or a provider within the authorizing providers department within the previous 12 mos or has a future within next 30 days. See \"Patient Info\" tab in inbasket, or \"Choose Columns\" in Meds & Orders section of the refill encounter.              Failed - Normal serum creatinine on file in past 12 months     Recent Labs   Lab Test 01/31/23  1651   CR 2.87*              Failed - Normal serum sodium on file in past 12 months     Recent Labs   Lab Test 01/31/23  1651   *              Passed - Blood pressure under 140/90 in past 12 months     BP Readings from Last 3 Encounters:   04/21/23 108/64   04/19/23 120/56   01/31/23 103/43                 Passed - Medication is active on med list        Passed - Patient is age 18 or older        Passed - No active pregancy on record        Passed - Normal serum potassium on file in past 12 months     Recent Labs   Lab Test 01/31/23  1651   POTASSIUM 4.4                    Passed - No positive pregnancy test in past 12 months         Routing refill request to provider for review/approval because medication did not pass protocol.    Pt was last seen on 04/21.23    Dionne Lei RN  [facility]   "

## 2023-06-16 ENCOUNTER — THERAPY VISIT (OUTPATIENT)
Dept: PHYSICAL THERAPY | Facility: CLINIC | Age: 73
End: 2023-06-16
Attending: PHYSICAL MEDICINE & REHABILITATION
Payer: MEDICARE

## 2023-06-16 DIAGNOSIS — S78.112A ABOVE KNEE AMPUTATION OF LEFT LOWER EXTREMITY (H): Primary | ICD-10-CM

## 2023-06-16 PROCEDURE — 97110 THERAPEUTIC EXERCISES: CPT | Mod: GP | Performed by: PHYSICAL THERAPIST

## 2023-06-16 PROCEDURE — 97530 THERAPEUTIC ACTIVITIES: CPT | Mod: GP | Performed by: PHYSICAL THERAPIST

## 2023-06-18 DIAGNOSIS — Z99.2 ESRD (END STAGE RENAL DISEASE) ON DIALYSIS (H): ICD-10-CM

## 2023-06-18 DIAGNOSIS — N18.6 ESRD (END STAGE RENAL DISEASE) ON DIALYSIS (H): ICD-10-CM

## 2023-06-19 RX ORDER — FUROSEMIDE 40 MG
40 TABLET ORAL DAILY
Qty: 60 TABLET | Refills: 11 | Status: SHIPPED | OUTPATIENT
Start: 2023-06-19 | End: 2023-07-17

## 2023-06-23 ENCOUNTER — OFFICE VISIT (OUTPATIENT)
Dept: ORTHOPEDICS | Facility: CLINIC | Age: 73
End: 2023-06-23
Payer: MEDICARE

## 2023-06-23 ENCOUNTER — OFFICE VISIT (OUTPATIENT)
Dept: DERMATOLOGY | Facility: CLINIC | Age: 73
End: 2023-06-23
Payer: MEDICARE

## 2023-06-23 DIAGNOSIS — L40.8 INVERSE PSORIASIS: Primary | ICD-10-CM

## 2023-06-23 DIAGNOSIS — L30.4 INTERTRIGO: ICD-10-CM

## 2023-06-23 DIAGNOSIS — D18.01 CHERRY ANGIOMA: ICD-10-CM

## 2023-06-23 DIAGNOSIS — L82.1 SEBORRHEIC KERATOSIS: ICD-10-CM

## 2023-06-23 DIAGNOSIS — L84 TYLOMA: ICD-10-CM

## 2023-06-23 DIAGNOSIS — I73.89 OTHER SPECIFIED PERIPHERAL VASCULAR DISEASES (H): Primary | ICD-10-CM

## 2023-06-23 DIAGNOSIS — L30.9 DERMATITIS: ICD-10-CM

## 2023-06-23 DIAGNOSIS — D22.9 MULTIPLE BENIGN NEVI: ICD-10-CM

## 2023-06-23 DIAGNOSIS — B35.1 OM (ONYCHOMYCOSIS): ICD-10-CM

## 2023-06-23 DIAGNOSIS — I73.9 PAD (PERIPHERAL ARTERY DISEASE) (H): ICD-10-CM

## 2023-06-23 DIAGNOSIS — L72.0 MILIA: ICD-10-CM

## 2023-06-23 DIAGNOSIS — Z85.828 HISTORY OF NONMELANOMA SKIN CANCER: ICD-10-CM

## 2023-06-23 DIAGNOSIS — L81.4 LENTIGINES: ICD-10-CM

## 2023-06-23 PROCEDURE — 11720 DEBRIDE NAIL 1-5: CPT | Mod: XS | Performed by: PODIATRIST

## 2023-06-23 PROCEDURE — 99213 OFFICE O/P EST LOW 20 MIN: CPT | Mod: 25 | Performed by: PODIATRIST

## 2023-06-23 PROCEDURE — 11055 PARING/CUTG B9 HYPRKER LES 1: CPT | Mod: Q7 | Performed by: PODIATRIST

## 2023-06-23 PROCEDURE — 99214 OFFICE O/P EST MOD 30 MIN: CPT | Performed by: DERMATOLOGY

## 2023-06-23 RX ORDER — TACROLIMUS 1 MG/G
OINTMENT TOPICAL 2 TIMES DAILY
Qty: 60 G | Refills: 3 | Status: SHIPPED | OUTPATIENT
Start: 2023-06-23

## 2023-06-23 ASSESSMENT — PAIN SCALES - GENERAL: PAINLEVEL: SEVERE PAIN (7)

## 2023-06-23 NOTE — PATIENT INSTRUCTIONS
Psoriasis skin folds  - add protopic (non-steroid)  - cont powder    Adhesive reaction  - try triamcinolone 0.1    Buttocks/groin  - vaseline daily/liberally  - when red/irritated use triamcinolone 0.025 twice daily for 2-4 weeks at a time, can take breaks  - when more calm, could try the protopic if not too irritating it can burn
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
## 2023-06-23 NOTE — NURSING NOTE
Dermatology Rooming Note    Supriya Herr's goals for this visit include:   Chief Complaint   Patient presents with     Skin Check     Supriya is here for a skin check and has spots of concern on her chest and abdomen, as well as a wound on her inner thigh.     Joe Gross, EMT

## 2023-06-23 NOTE — LETTER
6/23/2023         RE: Supriya Herr  [address]  [facility] [address]        Dear Colleague,    Thank you for referring your patient, Supriya Herr, to the [facility] [place]. Please see a copy of my visit note below.    Chief Complaint   Patient presents with    Follow Up     Nails.             Allergies   Allergen Reactions    Ampicillin-Sulbactam Sodium Rash     No evidence SJS, but very uncomfortable and precipitated multiple provider visits. Would not use penicillins again if other options available.     Penicillins Rash         Subjective: Supriya is a 73 year old female who presents to the clinic today for a diabetic foot exam and management.  Her nails do get long.  she has a history of amputation and from previous notes, we have documented that she has nonpalpable DP and PT pulses.     Objective    Hemoglobin A1C   Date Value Ref Range Status   04/21/2023 6.8 (H) 0.0 - 5.6 % Final     Comment:     Normal <5.7%   Prediabetes 5.7-6.4%    Diabetes 6.5% or higher     Note: Adopted from ADA consensus guidelines.   04/09/2021 6.2 (H) 0 - 5.6 % Final     Comment:     Normal <5.7% Prediabetes 5.7-6.4%  Diabetes 6.5% or higher - adopted from ADA   consensus guidelines.           Non-palpable DP and PT pulses L.   Equinus noted BL. Pes planus with rigid toe deformities noted to lesser digits on the right. Left AKA noted.   Nails are thickened, discolored, elongated, with subungual debris consistent with onychomycosis.          Assessment: DM2 with left AKA and neuropathy. She has severe vasculopathy.  Onychomycosis.   Tyloma     Plan:   - Pt seen and evaluated  - Nails debrided x 5.  - Tyloma debrided x 1  - Cont compression socks.  - See again in 3 months.    Eleazar Pack DPM

## 2023-06-23 NOTE — PROGRESS NOTES
Chief Complaint   Patient presents with     Follow Up     Nails.             Allergies   Allergen Reactions     Ampicillin-Sulbactam Sodium Rash     No evidence SJS, but very uncomfortable and precipitated multiple provider visits. Would not use penicillins again if other options available.      Penicillins Rash         Subjective: Supriya is a 73 year old female who presents to the clinic today for a diabetic foot exam and management.  Her nails do get long.  she has a history of amputation and from previous notes, we have documented that she has nonpalpable DP and PT pulses.     Objective    Hemoglobin A1C   Date Value Ref Range Status   04/21/2023 6.8 (H) 0.0 - 5.6 % Final     Comment:     Normal <5.7%   Prediabetes 5.7-6.4%    Diabetes 6.5% or higher     Note: Adopted from ADA consensus guidelines.   04/09/2021 6.2 (H) 0 - 5.6 % Final     Comment:     Normal <5.7% Prediabetes 5.7-6.4%  Diabetes 6.5% or higher - adopted from ADA   consensus guidelines.           Non-palpable DP and PT pulses L.   Equinus noted BL. Pes planus with rigid toe deformities noted to lesser digits on the right. Left AKA noted.   Nails are thickened, discolored, elongated, with subungual debris consistent with onychomycosis.          Assessment: DM2 with left AKA and neuropathy. She has severe vasculopathy.  Onychomycosis.   Tyloma     Plan:   - Pt seen and evaluated  - Nails debrided x 5.  - Tyloma debrided x 1  - Cont compression socks.  - See again in 3 months.

## 2023-06-27 NOTE — PROGRESS NOTES
Outcome for 06/27/23 8:48 AM: Mychart message sent  Karlene Estes MA  Outcome for 07/03/23 4:45 PM: Left Voicemail   Karlene Estes MA   Outcome for 07/05/23 11:39 AM: Data obtained via phone and located below  Karlene Estes MA      Patient is showing 4/5 MNCM met. Aspirin not prescribed   Karlene Estes MA      07/05/2023  10:15 am: 84    07/04/2023  1:38 pm: 213  8:52 am: 81  8:31 am: 83    07/03/2023  10:18 pm: 137  6:05 pm: 90  11:48 am: 83  5:31 am: 121    07/02/2023  10:01 pm: 119  6:41 pm: 96  3:04 pm: 97  1:58 pm: 109    07/01/2023  8:32 am: 91    06/30/2023  11:11 am: 83     06/28/2023  3:47 pm: 111  10:50 am: 99    06/26/2023  8:06 pm: 136  10:02 am: 91    06/25/2023  12:27 pm: 106  11:16 am: 102  8:20 am: 109    06/24/2023  9:34 am: 214    06/23/2023  10:04 am: 205

## 2023-06-28 ENCOUNTER — THERAPY VISIT (OUTPATIENT)
Dept: OCCUPATIONAL THERAPY | Facility: CLINIC | Age: 73
End: 2023-06-28
Attending: FAMILY MEDICINE
Payer: MEDICARE

## 2023-06-28 ENCOUNTER — THERAPY VISIT (OUTPATIENT)
Dept: PHYSICAL THERAPY | Facility: CLINIC | Age: 73
End: 2023-06-28
Attending: PHYSICAL MEDICINE & REHABILITATION
Payer: MEDICARE

## 2023-06-28 DIAGNOSIS — S78.112A ABOVE KNEE AMPUTATION OF LEFT LOWER EXTREMITY (H): Primary | ICD-10-CM

## 2023-06-28 DIAGNOSIS — N18.6 ESRD (END STAGE RENAL DISEASE) ON DIALYSIS (H): ICD-10-CM

## 2023-06-28 DIAGNOSIS — Z74.09 IMPAIRED MOBILITY AND ADLS: Primary | ICD-10-CM

## 2023-06-28 DIAGNOSIS — L89.300 PRESSURE INJURY OF BUTTOCK, UNSTAGEABLE, UNSPECIFIED LATERALITY (H): ICD-10-CM

## 2023-06-28 DIAGNOSIS — Z79.4 TYPE 2 DIABETES MELLITUS WITH DIABETIC NEUROPATHY, WITH LONG-TERM CURRENT USE OF INSULIN (H): ICD-10-CM

## 2023-06-28 DIAGNOSIS — Z99.2 ESRD (END STAGE RENAL DISEASE) ON DIALYSIS (H): ICD-10-CM

## 2023-06-28 DIAGNOSIS — E11.9 TYPE 2 DIABETES MELLITUS (H): ICD-10-CM

## 2023-06-28 DIAGNOSIS — Z78.9 IMPAIRED MOBILITY AND ADLS: Primary | ICD-10-CM

## 2023-06-28 DIAGNOSIS — E11.40 TYPE 2 DIABETES MELLITUS WITH DIABETIC NEUROPATHY, WITH LONG-TERM CURRENT USE OF INSULIN (H): ICD-10-CM

## 2023-06-28 PROCEDURE — 97542 WHEELCHAIR MNGMENT TRAINING: CPT | Mod: GO | Performed by: OCCUPATIONAL THERAPIST

## 2023-06-28 PROCEDURE — 97110 THERAPEUTIC EXERCISES: CPT | Mod: GP | Performed by: PHYSICAL THERAPIST

## 2023-06-28 PROCEDURE — 97530 THERAPEUTIC ACTIVITIES: CPT | Mod: GP | Performed by: PHYSICAL THERAPIST

## 2023-06-28 NOTE — PROGRESS NOTES
"  SEATING AND WHEELED MOBILITY ASSESSMENT   Quick Adds   Quick Adds Certification;Current Manual Wheelchair   General Information    Rehab Discipline OT   Funding Medicare/[employer]   Service Outpatient;Occupational Therapy;Seating/Wheeled Mobility Evaluation   Height 5'6\"   Weight 160   Start Of Care Date 6/28/23   Referring Physician Noam Jackson   Orders Evaluate And Treat As Indicated;Per Therapist Evaluation   Orders Date 5/11/23   Others Present at Evaluation Daughter   Patient/Caregiver Goals Pressure Relief   Rehabilitation Technology Supplier Usha [facility]IE[facility] from [facility]   Current Community Support Family/Friend Caregiver;Therapy Services  (24 hour care)   Patient role/Employment history Disabled   Fall Risk Screen   Fall screen completed by OT   Have you fallen 2 or more times in the past year? Yes   Have you fallen and had an injury in the past year? Yes   Is patient a fall risk? Yes;Department fall risk interventions implemented   Medical History   Onset Of Illness/injury Or Date Of Surgery 7/26/21  (order)   Medical Diagnosis L AKA   Medical History DMII, R Tib fracture, CKD, CHF, Dialysis,    Current Manual Wheelchair   Age 2[birth year]8[birth year]    Quickie 2   Cushion Gel  (Matrx)   Wheelchair Back Upholstered   Footrest Style swing away not present   Settings Used Home;Outdoor Community Mobility;Primary Means of Transportation   Condition Fair   Current Equipment Requires Update   Home Accessibility   Living Environment House   Primary Entrance Exposed Ramp   Community ADL   Transportation Car   Community Mobility Requirements Medical Appointments;Shopping   Cognitive/Visual/Hearing Status   Vision Corrective Lenses   ADL Status   Feeding Independent   Grooming/Hygiene Independent   Dressing Independent   Toileting Independent;Uses Equipment  (RTS)   Bathing Requires Assist;Uses Equipment  (cannot safely get into shower with standard chair)   Meal Preparation Requires Assist   Home Management Requires " Assist       Balance   Unsupported Sitting Balance Uses Upper Extremities for Balance   Sitting Balance in Chair Uses Upper Extremities for Balance   Standing Balance Unable   Ambulation   Ambulation Non Ambulatory   Transfers   Transfer Assist Independent;Moderate Assist   Neuromuscular   History of Pressure Sores Yes   Pain Yes   Pain Location L stump   Sensory Deficits Reported Bilateral neuropathies in hands   Education Assessment   Barriers to Learning Physical;Emotional;Cognitive   Preferred Learning Style Listening;Demonstration   Assessment/Plan   Criteria for Skilled Interventions Met Yes, Treatment Indicated   Treatment Diagnosis impaired participation in MRADLS   Planned Therapy Interventions Wheelchair Management/Propulsion Training   Planned Therapy Interventions Comments Patient reports getting all items from visit one year ago.  Reports healing wounds and knowing they are from slide transfers.   Risks and benefits of treatment have been explained Yes   Patient/family & other staff in agreement with plan of care Yes   Comments Solid seat insert needed to aid in accommodating sway of seat upholstery and preventing internal rotations of hips.    Session Time   OT Wheelchair Management Minutes (60511) 25   Adult OT Eval Goals   OT Eval Goals (Adult) 1    OT Goal 1   Goal Identifier wheelchair   Goal Description Patient to demonstrate understanding of rec.   Goal Progress To be completed   Target Date 6/28/23   Date Met 6/28/23       Electronically signed by:  Brenna MACHADO/AMLAIKA, ATP      Occupational Therapist, Assistive   [phone]      fax: [phone]      [email or web address]  Seating Clinic- [facility], [address]  [address]  [place]   [address]

## 2023-06-28 NOTE — PATIENT INSTRUCTIONS
6/28/23    PUT leg on and stand at kitchen counter with someone on:  WEDNESDAYS   FRIDAYS  SUNDAYS  And   Mondays    Give Oleg feedback when you see him.   July 14.    Caroline-- look into adaptive pants or snap off pants.  (or Velcro)

## 2023-07-03 ENCOUNTER — TELEPHONE (OUTPATIENT)
Dept: ENDOCRINOLOGY | Facility: CLINIC | Age: 73
End: 2023-07-03
Payer: MEDICARE

## 2023-07-03 NOTE — TELEPHONE ENCOUNTER
Attempted to reach patient for upcoming appointment; 14 days of glucose data needed. No answer, LM on VM to call office back.     Appointment with Arabella Kamara PA-C on 7/5/23    Karlene Estes MA

## 2023-07-05 ENCOUNTER — THERAPY VISIT (OUTPATIENT)
Dept: PHYSICAL THERAPY | Facility: CLINIC | Age: 73
End: 2023-07-05
Attending: PHYSICAL MEDICINE & REHABILITATION
Payer: MEDICARE

## 2023-07-05 ENCOUNTER — VIRTUAL VISIT (OUTPATIENT)
Dept: ENDOCRINOLOGY | Facility: CLINIC | Age: 73
End: 2023-07-05
Payer: MEDICARE

## 2023-07-05 DIAGNOSIS — Z79.4 TYPE 2 DIABETES MELLITUS WITH DIABETIC NEUROPATHY, WITH LONG-TERM CURRENT USE OF INSULIN (H): Primary | ICD-10-CM

## 2023-07-05 DIAGNOSIS — S78.112A ABOVE KNEE AMPUTATION OF LEFT LOWER EXTREMITY (H): Primary | ICD-10-CM

## 2023-07-05 DIAGNOSIS — E11.40 TYPE 2 DIABETES MELLITUS WITH DIABETIC NEUROPATHY, WITH LONG-TERM CURRENT USE OF INSULIN (H): Primary | ICD-10-CM

## 2023-07-05 PROCEDURE — 97530 THERAPEUTIC ACTIVITIES: CPT | Mod: GP | Performed by: PHYSICAL THERAPIST

## 2023-07-05 PROCEDURE — 97110 THERAPEUTIC EXERCISES: CPT | Mod: GP | Performed by: PHYSICAL THERAPIST

## 2023-07-05 PROCEDURE — 99442 PR PHYSICIAN TELEPHONE EVALUATION 11-20 MIN: CPT | Mod: VID | Performed by: PHYSICIAN ASSISTANT
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
## 2023-07-05 NOTE — NURSING NOTE
Is the patient currently in the state of [place]? YES    Visit mode:VIDEO    If the visit is dropped, the patient can be reconnected by: TELEPHONE VISIT: Phone number: [phone]    Will anyone else be joining the visit? NO    How would you like to obtain your AVS? MyChart    Are changes needed to the allergy or medication list? NO    Reason for visit:   Chief Complaint   Patient presents with     RECHECK     Type 2 Diabetes       Karlene Estes MA

## 2023-07-12 ENCOUNTER — THERAPY VISIT (OUTPATIENT)
Dept: PHYSICAL THERAPY | Facility: CLINIC | Age: 73
End: 2023-07-12
Attending: PHYSICAL MEDICINE & REHABILITATION
Payer: MEDICARE

## 2023-07-12 DIAGNOSIS — S78.112A ABOVE KNEE AMPUTATION OF LEFT LOWER EXTREMITY (H): Primary | ICD-10-CM

## 2023-07-12 PROCEDURE — 97530 THERAPEUTIC ACTIVITIES: CPT | Mod: GP | Performed by: PHYSICAL THERAPIST

## 2023-07-12 PROCEDURE — 97110 THERAPEUTIC EXERCISES: CPT | Mod: GP | Performed by: PHYSICAL THERAPIST

## 2023-07-14 DIAGNOSIS — Z99.2 ESRD (END STAGE RENAL DISEASE) ON DIALYSIS (H): ICD-10-CM

## 2023-07-14 DIAGNOSIS — N18.6 ESRD (END STAGE RENAL DISEASE) ON DIALYSIS (H): ICD-10-CM

## 2023-07-17 RX ORDER — FUROSEMIDE 40 MG
40 TABLET ORAL DAILY
Qty: 60 TABLET | Refills: 11 | Status: SHIPPED | OUTPATIENT
Start: 2023-07-17 | End: 2023-09-26

## 2023-07-19 ENCOUNTER — THERAPY VISIT (OUTPATIENT)
Dept: PHYSICAL THERAPY | Facility: CLINIC | Age: 73
End: 2023-07-19
Attending: PHYSICAL MEDICINE & REHABILITATION
Payer: MEDICARE

## 2023-07-19 DIAGNOSIS — S78.112A ABOVE KNEE AMPUTATION OF LEFT LOWER EXTREMITY (H): Primary | ICD-10-CM

## 2023-07-19 PROCEDURE — 97530 THERAPEUTIC ACTIVITIES: CPT | Mod: GP | Performed by: PHYSICAL THERAPIST

## 2023-07-21 NOTE — TELEPHONE ENCOUNTER
Social Work Intervention  [facility]    Data/Intervention:    Patient Name:  Supriya Herr  /Age:  1950 (68 year old)    Visit Type: telephone  Referral Source: Ortho clinic  Reason for Referral:  TCU placement after upcoming surgery on 2018    Collaborated With:    -Patient's daughter, Caroline Sandoval ([phone])  -[facility] Admissions (Phone: [phone], Fax: [phone])    Patient Concerns/Issues:   Patient has had a previous below the knee amputation and is coming in for surgery on the remaining foot. Patient has her own wheelchair and will be non-weight bearing after surgery.  Patient will not be able to care for herself at home. Patient currently receives adult day services at [facility] and would like to be placed there for TCU after surgery. Patient's daughter, Caroline, is very involved and proactive. Caroline plans to be present at the hospital at time of surgery.     Intervention/Education/Resources Provided:   faxed referral to [facility] and secured a bed for 2018. PAS completed and level of care is determined. The confirmation number is [id number].     Assessment/Plan:   handed off case to hospital , Delisa Marion, who will fax discharge med list and arrange for transport at discharge.     Provided patient/family with contact information and availability.    BOYD Guajardo  [facility] Clin[facility]  Direct Phone: [phone]  Pager:  [phone]       no

## 2023-07-25 ENCOUNTER — MEDICAL CORRESPONDENCE (OUTPATIENT)
Dept: HEALTH INFORMATION MANAGEMENT | Facility: CLINIC | Age: 73
End: 2023-07-25
Payer: MEDICARE

## 2023-07-26 ENCOUNTER — THERAPY VISIT (OUTPATIENT)
Dept: PHYSICAL THERAPY | Facility: CLINIC | Age: 73
End: 2023-07-26
Attending: PHYSICAL MEDICINE & REHABILITATION
Payer: MEDICARE

## 2023-07-26 DIAGNOSIS — S78.112A ABOVE KNEE AMPUTATION OF LEFT LOWER EXTREMITY (H): Primary | ICD-10-CM

## 2023-07-26 DIAGNOSIS — R53.81 PHYSICAL DECONDITIONING: ICD-10-CM

## 2023-07-26 PROCEDURE — 97116 GAIT TRAINING THERAPY: CPT | Mod: GP | Performed by: PHYSICAL THERAPIST

## 2023-07-26 PROCEDURE — 97110 THERAPEUTIC EXERCISES: CPT | Mod: GP | Performed by: PHYSICAL THERAPIST

## 2023-08-02 ENCOUNTER — THERAPY VISIT (OUTPATIENT)
Dept: PHYSICAL THERAPY | Facility: CLINIC | Age: 73
End: 2023-08-02
Attending: PHYSICAL MEDICINE & REHABILITATION
Payer: MEDICARE

## 2023-08-02 DIAGNOSIS — S78.112A ABOVE KNEE AMPUTATION OF LEFT LOWER EXTREMITY (H): Primary | ICD-10-CM

## 2023-08-02 PROCEDURE — 97116 GAIT TRAINING THERAPY: CPT | Mod: GP,KX | Performed by: PHYSICAL THERAPIST

## 2023-08-02 PROCEDURE — 97110 THERAPEUTIC EXERCISES: CPT | Mod: GP,KX | Performed by: PHYSICAL THERAPIST

## 2023-08-04 NOTE — PROGRESS NOTES
[facility]    Outpatient Physical Therapy Progress Note and Care Plan      [facility]                                                                                   OUTPATIENT PHYSICAL THERAPY    PLAN OF TREATMENT FOR OUTPATIENT REHABILITATION   Patient's Last Name, First Name, Supriya Hernandez YOB: 1950   Provider's Name   [facility]   Medical Record No.  [id number]     Onset Date: 09/11/89  Start of Care Date: 12/09/22     Medical Diagnosis:  Above knee amputation of left lower extremity; Wheelchair User      PT Treatment Diagnosis:  Impaired mobility Plan of Treatment  Frequency/Duration: 1x per week/ 90 days    Certification date from 08/02/23 to 10/30/23         See note for plan of treatment details and functional goals     Kristin Herrera, PT                         I CERTIFY THE NEED FOR THESE SERVICES FURNISHED UNDER        THIS PLAN OF TREATMENT AND WHILE UNDER MY CARE     (Physician attestation of this document indicates review and certification of the therapy plan).                Referring Provider:  Kamryn Lazo      Initial Assessment  See Epic Evaluation- Start of Care Date: 12/09/22 08/02/23 0500   Appointment Info   Signing clinician's name / credentials Kathy Herrera DPT, Board-Certified Neurologic Clinical Specialist   Visits Used 26-Medicare/[employer]   Medical Diagnosis Above knee amputation of left lower extremity; Wheelchair User   PT Tx Diagnosis Impaired mobility   Progress Note/Certification   Start of Care Date 12/09/22   Onset of illness/injury or Date of Surgery 09/11/89   Therapy Frequency 1x per week   Predicted Duration 90 days   Certification date from 08/02/23   Certification date to 10/30/23   KX Modifier Statement Therapy services meets medical necessity requirements beyond the  therapy threshold for ongoing care.   PT Goal 1   Goal Identifier Standing tolerance   Goal Description Pt will be able to tolerate standing >5 min in parallel bars w/ single UE support in order to build weightbearing tolerance of R LE   Goal Progress eval: able to  // bars with heavy B UE support x30 sec.  2/15/23  able to stand 3x today less pressure on bars. sba to cga. 8/2/23: Can  // bars for 3 mins with 2 hand support. Pt is too nervous to remove 1 hand.   Target Date 10/30/23   PT Goal 2   Goal Identifier Transfers   Goal Description Pt will demonstrate proper mechanics of stand pivot transfer with walker from chair<>mat in order to improve safety with transfers at home   Goal Progress eval: see transfers in eval for description. 2/15/23 pt can do a squat pivot tx w/ sba but not stand pivot yet. 8/4/23: Working on building confidence to stand with WW - transfer not attempted yet.   Target Date 10/30/23   PT Goal 3   Goal Identifier HEP   Goal Description Pt will be inependent with HEP at least 4x/week in order to improve self management of symptoms   Target Date 10/30/23   Goal Progress 8/2/23: Pt notes she has not been consistent about doing this. Has been actively participating in PT when she attends, but has not been following through with strength or stretching recommendations. Pt states she will work on that this week.   PT Goal 4   Goal Identifier prosthetic   Goal Description pt to walk 20ft w/ walker and new prosthetic L w/ supervision in home setting   Goal Progress 8/2/23: Pt amb 14' in // bars x3 with CGA and close w/c follow, increased trunk flex throghout gait cycle.   Target Date 10/30/23   Subjective Report   Subjective Report I had a bucnh of adjustments done to prosthesis with Oleg earlier this week. Feeling lower energy today.   Objective Measure 1   Objective Measure Standing tolerance   Details 3 mins with 2 hand support in // bars   Objective Measure 3   Objective Measure  "// bars, walking   Details 14'x3, 8'x1 with CGA and close w/c follow - bars at 33\"   Therapeutic Procedure/Exercise   Therapeutic Procedures: strength, endurance, ROM, flexibillity minutes (74043) 15   Skilled Intervention Prone laying 2:30+3 mins with pillows under chest, R LE supported on her w/c seat. Pt notes there is no where to do this at home, but then there was discussion with daughter Caroline about an ottoman that possibly could be used. Strong encouragement to work on prone laying as much as possible. Manual stretching of R LE while in L sidelying 40 secs x1. Verbal review of strength HEP as pt has not been exercising at home - reminded pt about hip abd with band and LAQ R with band - pt noted she knew where the bands were and that she could do that this week.   Gait Training   Gait Training Minutes, includes stair climbing (74537) 25   Treatment Detail Assisted pt to don prosthetic - was able to manage feeding the strap, but needed assist to hold the socket steady while she did so. In standing, weight shifting to ensure positioning in socket - pt able to adjust tension with // bar support. Able to stand ~3 mins with 2 hand support. Amb per above, cues for upright posture, taking smaller but more even steps as pt tends to step to pattern, heavy UE use on // bars and flexed posture. Attempted sit/stand to WW in // bars with PT stabilizing, but pt was too anxious - did stand with hands on // bars and then transitioned to WW, but refused to stand this way for more than 10 secs due to it being too unsteady. Recommended they bring her WW from home as this was slightly higher than the // bars and unable to be lowered.   Education   Learner/Method Patient;Family   Education Comments importance of HEP   Plan   Plan for next session Has she worked on her home program again? Start with prone laying to stretch hip - see if this makes walking any easier. Cont work on sit/stand to WW (she might bring in hers as she thinks " it is lower than ours). Cont gait training.   Total Session Time   Timed Code Treatment Minutes 40   Total Treatment Time (sum of timed and untimed services) 40

## 2023-08-09 ENCOUNTER — THERAPY VISIT (OUTPATIENT)
Dept: PHYSICAL THERAPY | Facility: CLINIC | Age: 73
End: 2023-08-09
Attending: PHYSICAL MEDICINE & REHABILITATION
Payer: MEDICARE

## 2023-08-09 DIAGNOSIS — R53.81 PHYSICAL DECONDITIONING: ICD-10-CM

## 2023-08-09 DIAGNOSIS — S78.112A ABOVE KNEE AMPUTATION OF LEFT LOWER EXTREMITY (H): Primary | ICD-10-CM

## 2023-08-09 PROCEDURE — 97110 THERAPEUTIC EXERCISES: CPT | Mod: GP,KX | Performed by: PHYSICAL THERAPIST

## 2023-08-09 PROCEDURE — 97116 GAIT TRAINING THERAPY: CPT | Mod: GP,KX | Performed by: PHYSICAL THERAPIST

## 2023-08-15 ENCOUNTER — MYC MEDICAL ADVICE (OUTPATIENT)
Dept: FAMILY MEDICINE | Facility: CLINIC | Age: 73
End: 2023-08-15
Payer: MEDICARE

## 2023-08-15 DIAGNOSIS — Z79.4 TYPE 2 DIABETES MELLITUS WITH DIABETIC NEPHROPATHY, WITH LONG-TERM CURRENT USE OF INSULIN (H): ICD-10-CM

## 2023-08-15 DIAGNOSIS — I10 ESSENTIAL HYPERTENSION: ICD-10-CM

## 2023-08-15 DIAGNOSIS — E11.21 TYPE 2 DIABETES MELLITUS WITH DIABETIC NEPHROPATHY, WITH LONG-TERM CURRENT USE OF INSULIN (H): ICD-10-CM

## 2023-08-15 RX ORDER — CARVEDILOL 25 MG/1
25 TABLET ORAL 2 TIMES DAILY WITH MEALS
Qty: 180 TABLET | Refills: 3 | Status: SHIPPED | OUTPATIENT
Start: 2023-08-15 | End: 2023-08-15

## 2023-08-15 RX ORDER — CARVEDILOL 25 MG/1
25 TABLET ORAL 2 TIMES DAILY WITH MEALS
Qty: 180 TABLET | Refills: 3 | Status: SHIPPED | OUTPATIENT
Start: 2023-08-15 | End: 2024-07-22

## 2023-08-15 NOTE — TELEPHONE ENCOUNTER
"Requested Prescriptions   Pending Prescriptions Disp Refills    carvedilol (COREG) 25 MG tablet 180 tablet 3     Sig: Take 1 tablet (25 mg) by mouth 2 times daily (with meals)       Beta-Blockers Protocol Passed - 8/15/2023 11:18 AM        Passed - Blood pressure under 140/90 in past 12 months     BP Readings from Last 3 Encounters:   04/21/23 108/64   04/19/23 120/56   01/31/23 103/43                 Passed - Patient is age 6 or older        Passed - Recent (12 mo) or future (30 days) visit within the authorizing provider's specialty     Patient has had an office visit with the authorizing provider or a provider within the authorizing providers department within the previous 12 mos or has a future within next 30 days. See \"Patient Info\" tab in inbasket, or \"Choose Columns\" in Meds & Orders section of the refill encounter.              Passed - Medication is active on med list            Prescription approved per [facility] Refill Protocol.     Pt was last seen on 04/21/23    Dionne Lei RN  [facility]   "

## 2023-08-16 ENCOUNTER — PATIENT OUTREACH (OUTPATIENT)
Dept: NEPHROLOGY | Facility: CLINIC | Age: 73
End: 2023-08-16
Payer: MEDICARE

## 2023-08-16 ENCOUNTER — TELEPHONE (OUTPATIENT)
Dept: TRANSPLANT | Facility: CLINIC | Age: 73
End: 2023-08-16
Payer: MEDICARE

## 2023-08-16 DIAGNOSIS — T82.898A PROBLEM WITH DIALYSIS ACCESS, INITIAL ENCOUNTER (H): Primary | ICD-10-CM

## 2023-08-16 NOTE — PROGRESS NOTES
Evaluation of need for IR intervention    Received message requesting fistulogram of left upper extremity AV graft created on 5/7/2020.    Message: Concern need for fistulogram, pt has had prolonged bleeding for about a week post HD tx.  Pt has complicated access hx.    Neph Tracking Flowsheet Last Filled Values       Final Modality Hemodialysis    Patient's Referral Dates Auto Populate Patient's Referral Dates    Dietician Referral 12/28/21    MTM Referral 6/25/18    Home Care Referral 11/26/21    Vein Mapping/US  02/06/23  steal studies on LUE    Diaylsis Access Type AV Graft    Dialysis Access Site [facility]    Dialysis Access Surgery 05/07/20    Dialysis Access Surgeon BERNY CAZARESG with Dr. Martin.  Currently followed by Dr. Banks.  Had RUE AVF created and then ligated prior to use d/t steal syndrome 2/2/22.           Dialysis History        Start End Type Center Comments    6/13/2020  Hemo-[facility] Center [facility] ([facility]) Dialysis day: Tues/Thurs/Sat. Nephrologist: Dr. Basilio                    Patient is experiencing prolonged bleeding    Date of last US: 2/6/23    Patient is not on blood thinners.    IR Referral order placed requesting intervention.    Please call daughter with all scheduling requests: Caroline Sandoval [phone]    KIRTI GRESHAM RN  Dialysis Access Care Coordinator  Phone: [phone]  Pool: P_Dialysis_Access_Nurse

## 2023-08-16 NOTE — TELEPHONE ENCOUNTER
Returned pt call regarding mychart with fistula. Provided vascular access number and also routed mychart to vascular access nurse. Caroline pt daughter to call to follow up.

## 2023-08-17 NOTE — PROGRESS NOTES
IR attempted to call pt daughter, Caroline, to schedule fistulogram.  Pt daughter inquiring about US prior.    Called Caroline back to clarify.  Caroline stated she was just confirming her mom didn't need an ultrasound first and could have the fistulogram.  Confirmed that pt can move forward with fistulogram scheduling per IR.  Caroline is expecting a call back to schedule.    KIRTI GRESHAM RN on 8/17/2023 at 5:02 PM  Dialysis Access Care Coordinator  Phone: [phone]  Pool: P_Dialysis_Access_Nurse

## 2023-08-18 ENCOUNTER — HOSPITAL ENCOUNTER (OUTPATIENT)
Facility: CLINIC | Age: 73
End: 2023-08-18
Admitting: SURGERY
Payer: MEDICARE

## 2023-08-21 NOTE — PROGRESS NOTES
"Received voicemail from Yen WOMACK, Alina on Friday, 8/18/23.    Pt is not having \"traditional\" prolonged bleeding, but has been asking for clamps to be removed after 5 mins and sometimes is still bleeding.  At this point, a fistulogram is not warranted.    HD RN will reach out if prolonged bleeding worsens and pt needs fistulogram.    Will confirm plan with pt daughter, Caroline, and cancel upcoming fistulogram.    KIRTI GRESHAM RN on 8/21/2023 at 2:58 PM  Dialysis Access Care Coordinator  Phone: [phone]  Pool: P_Dialysis_Access_Nurse    "

## 2023-08-22 ENCOUNTER — DOCUMENTATION ONLY (OUTPATIENT)
Dept: INTERVENTIONAL RADIOLOGY/VASCULAR | Facility: CLINIC | Age: 73
End: 2023-08-22
Payer: MEDICARE

## 2023-08-22 NOTE — PROGRESS NOTES
Outpatient IR Referral    Message from referring team for fistulgram.     There was miscommunication and patient is not having true prolonged bleeding after dialysis.  They would like to cancel the upcoming fistulogram please.     Procedure cancelled.     Please see encounter note 8/16/23 from Deisy Crisostomo RN and Dr. Banks.     Amparo NEGRETE  Interventional Radiology   IR on-call pager: [phone]

## 2023-08-25 ENCOUNTER — PATIENT OUTREACH (OUTPATIENT)
Dept: GASTROENTEROLOGY | Facility: CLINIC | Age: 73
End: 2023-08-25
Payer: MEDICARE

## 2023-08-25 DIAGNOSIS — Z12.11 SPECIAL SCREENING FOR MALIGNANT NEOPLASMS, COLON: Primary | ICD-10-CM

## 2023-08-25 NOTE — PROGRESS NOTES
"Ordering/Referring Provider: Dr. Noam Jackson  BMI: Estimated body mass index is 34.86 kg/m  as calculated from the following:    Height as of 4/21/23: 1.676 m (5' 6\").    Weight as of 4/21/23: 98 kg (216 lb).     Sedation:  Based on patient's medical history patient is appropriate for   MAC/deep sedation.   BMI<= 45 45 < BMI <= 48 48 < BMI < = 50  BMI > 50   No Restrictions No [facility] ASC  No E[facility]  [facility] with exceptions Hospital Only OR Only       Location:  Does patient have an LVAD?  No    Does patient have a history of moderate to severe sleep apnea?  No-AHI 19.3    Does patient have a history of asthma, COPD or any other lung disease?  Yes     Patient has a documented history of COPD.     Review of chart, indicate respiratory disease is documented as well controlled. (no scheduling restrictions).    Does patient use home oxygen?  No    Does patient have a history of cardiac disease?  Yes     In the past 6 months, has the patient been hospitalized for any cardiac issues including cardiomyopathy, heart attack, or stent placement?  No    Does the patient have any implantable devices (pacemaker, ICD)?  No    Is patient awaiting a heart or lung transplant?   No    Has patient had a stroke or transient ischemic attack in the last 6 months?   No    Is the patient currently on dialysis?   Yes (Hospital Only)    Prep:  Previous prep (last colonoscopy):   N/A    Quality of previous prep:   N/A    Is patient currently on dialysis, is ESRD, or CKD stage 4/5?   No (standard prep)    Does patient have a diagnosis of diabetes?  Yes (Golytely Prep)    Does patient have a diagnosis of cystic fibrosis (CF)?  No    BMI > 40?  No    Final Referral Status:  meets the criteria for placement of colonoscopy screening [facility] referral order.      Referral order placed with the following recommendations:  Sedation: MAC/Deep Sedation  Location Type: Hospital  Prep: Standard Golytely        "

## 2023-08-31 ENCOUNTER — TELEPHONE (OUTPATIENT)
Dept: TRANSPLANT | Facility: CLINIC | Age: 73
End: 2023-08-31
Payer: MEDICARE

## 2023-09-08 ENCOUNTER — THERAPY VISIT (OUTPATIENT)
Dept: PHYSICAL THERAPY | Facility: CLINIC | Age: 73
End: 2023-09-08
Attending: PHYSICAL MEDICINE & REHABILITATION
Payer: MEDICARE

## 2023-09-08 DIAGNOSIS — R53.81 PHYSICAL DECONDITIONING: ICD-10-CM

## 2023-09-08 DIAGNOSIS — S78.112A ABOVE KNEE AMPUTATION OF LEFT LOWER EXTREMITY (H): Primary | ICD-10-CM

## 2023-09-08 PROCEDURE — 97116 GAIT TRAINING THERAPY: CPT | Mod: GP | Performed by: PHYSICAL THERAPIST

## 2023-09-15 ENCOUNTER — THERAPY VISIT (OUTPATIENT)
Dept: PHYSICAL THERAPY | Facility: CLINIC | Age: 73
End: 2023-09-15
Attending: PHYSICAL MEDICINE & REHABILITATION
Payer: MEDICARE

## 2023-09-15 DIAGNOSIS — S78.112A ABOVE KNEE AMPUTATION OF LEFT LOWER EXTREMITY (H): Primary | ICD-10-CM

## 2023-09-15 DIAGNOSIS — E11.40 TYPE 2 DIABETES MELLITUS WITH DIABETIC NEUROPATHY, WITH LONG-TERM CURRENT USE OF INSULIN (H): ICD-10-CM

## 2023-09-15 DIAGNOSIS — Z79.4 TYPE 2 DIABETES MELLITUS WITH DIABETIC NEUROPATHY, WITH LONG-TERM CURRENT USE OF INSULIN (H): ICD-10-CM

## 2023-09-15 DIAGNOSIS — R53.81 PHYSICAL DECONDITIONING: ICD-10-CM

## 2023-09-15 PROCEDURE — 97116 GAIT TRAINING THERAPY: CPT | Mod: GP,KX | Performed by: PHYSICAL THERAPIST

## 2023-09-15 NOTE — PATIENT INSTRUCTIONS
9/15/23      STAND with prosthetic and walker facing counter ON SUNDAY afternoon.    GOAL is MWF/ Sundays . To wear the leg and stand    This will help get you stronger.   It will help with  un weighting  the buttocks.

## 2023-09-17 DIAGNOSIS — Z79.4 TYPE 2 DIABETES MELLITUS WITH HYPERGLYCEMIA, WITH LONG-TERM CURRENT USE OF INSULIN (H): ICD-10-CM

## 2023-09-17 DIAGNOSIS — E11.65 TYPE 2 DIABETES MELLITUS WITH HYPERGLYCEMIA, WITH LONG-TERM CURRENT USE OF INSULIN (H): ICD-10-CM

## 2023-09-18 RX ORDER — INSULIN ASPART 100 [IU]/ML
INJECTION, SOLUTION INTRAVENOUS; SUBCUTANEOUS
Qty: 15 ML | Refills: 1 | Status: SHIPPED | OUTPATIENT
Start: 2023-09-18 | End: 2023-09-19

## 2023-09-18 NOTE — TELEPHONE ENCOUNTER
NOVOLOG 100 UNIT/ML FLEXPEN       Last Written Prescription Date:  6-12-23  Last Fill Quantity: 15 ml,   # refills: 1  Last Office Visit : 7-5-23  Future Office visit:  11-15-23    Routing refill request to provider for review/approval because:  Insulin - refilled per clinic

## 2023-09-19 ENCOUNTER — TELEPHONE (OUTPATIENT)
Dept: PHYSICAL MEDICINE AND REHAB | Facility: CLINIC | Age: 73
End: 2023-09-19
Payer: MEDICARE

## 2023-09-19 ENCOUNTER — TELEPHONE (OUTPATIENT)
Dept: ENDOCRINOLOGY | Facility: CLINIC | Age: 73
End: 2023-09-19
Payer: MEDICARE

## 2023-09-19 DIAGNOSIS — E11.65 TYPE 2 DIABETES MELLITUS WITH HYPERGLYCEMIA, WITH LONG-TERM CURRENT USE OF INSULIN (H): ICD-10-CM

## 2023-09-19 DIAGNOSIS — Z79.4 TYPE 2 DIABETES MELLITUS WITH HYPERGLYCEMIA, WITH LONG-TERM CURRENT USE OF INSULIN (H): ICD-10-CM

## 2023-09-19 RX ORDER — INSULIN ASPART 100 [IU]/ML
INJECTION, SOLUTION INTRAVENOUS; SUBCUTANEOUS
Qty: 15 ML | Refills: 1 | Status: SHIPPED | OUTPATIENT
Start: 2023-09-19

## 2023-09-19 NOTE — TELEPHONE ENCOUNTER
[facility] Call Center    Phone Message    May a detailed message be left on voicemail: yes     Reason for Call: Medication Question or concern regarding medication   Prescription Clarification  Name of Medication: Novalog  Prescribing Provider: caio chapman   Pharmacy: [facility] - [place], [place] - [address]    What on the order needs clarification? novalog orders do not match daily dosage please contact pharmacy to confirm      Action Taken: Other: endo    Travel Screening: Not Applicable

## 2023-09-19 NOTE — PROGRESS NOTES
Patient has been assessed for Home Oxygen needs. Oxygen readings:    *Pulse oximetry (SpO2) = 93  % on room air at rest while awake.    *SpO2 improved to 95% on 1 liters/minute at rest.    *SpO2 = 89-91 % on room air during activity/with exercise.    *SpO2 improved to 92% on 1 liters/minute during activity/with exercise.       Cosentyx Counseling:  I discussed with the patient the risks of Cosentyx including but not limited to worsening of Crohn's disease, immunosuppression, allergic reactions and infections.  The patient understands that monitoring is required including a PPD at baseline and must alert us or the primary physician if symptoms of infection or other concerning signs are noted.

## 2023-09-19 NOTE — TELEPHONE ENCOUNTER
Pt. Declined to schedule appt with Dr Davidson, she is currently seeing at the amputee clinic by someone else.

## 2023-09-20 ENCOUNTER — THERAPY VISIT (OUTPATIENT)
Dept: PHYSICAL THERAPY | Facility: CLINIC | Age: 73
End: 2023-09-20
Attending: PHYSICAL MEDICINE & REHABILITATION
Payer: MEDICARE

## 2023-09-20 DIAGNOSIS — S78.112A ABOVE KNEE AMPUTATION OF LEFT LOWER EXTREMITY (H): Primary | ICD-10-CM

## 2023-09-20 DIAGNOSIS — Z79.4 TYPE 2 DIABETES MELLITUS WITH DIABETIC NEUROPATHY, WITH LONG-TERM CURRENT USE OF INSULIN (H): ICD-10-CM

## 2023-09-20 DIAGNOSIS — R53.81 PHYSICAL DECONDITIONING: ICD-10-CM

## 2023-09-20 DIAGNOSIS — E11.40 TYPE 2 DIABETES MELLITUS WITH DIABETIC NEUROPATHY, WITH LONG-TERM CURRENT USE OF INSULIN (H): ICD-10-CM

## 2023-09-20 DIAGNOSIS — Z79.4 TYPE 2 DIABETES MELLITUS WITH HYPERGLYCEMIA, WITH LONG-TERM CURRENT USE OF INSULIN (H): ICD-10-CM

## 2023-09-20 DIAGNOSIS — E11.65 TYPE 2 DIABETES MELLITUS WITH HYPERGLYCEMIA, WITH LONG-TERM CURRENT USE OF INSULIN (H): ICD-10-CM

## 2023-09-20 PROCEDURE — 97110 THERAPEUTIC EXERCISES: CPT | Mod: GP,KX | Performed by: PHYSICAL THERAPIST

## 2023-09-20 PROCEDURE — 97116 GAIT TRAINING THERAPY: CPT | Mod: GP,KX | Performed by: PHYSICAL THERAPIST

## 2023-09-20 RX ORDER — INSULIN ASPART 100 [IU]/ML
INJECTION, SOLUTION INTRAVENOUS; SUBCUTANEOUS
Refills: 1 | OUTPATIENT
Start: 2023-09-20

## 2023-09-20 NOTE — TELEPHONE ENCOUNTER
NOVOLOG 100 UNIT/ML FLEXPEN    Disp Refills Start End JONATHON   insulin aspart (NOVOLOG FLEXPEN) 100 UNIT/ML pen 15 mL 1 9/19/2023  No   Sig: Inject subcu 5 units with breakfast, lunch and dinner. Approx daily dose 15 units.   Sent to pharmacy as: NovoLOG FlexPen 100 UNIT/ML Subcutaneous Solution Pen-injector (insulin aspart)   Class: E-Prescribe   Order: [id number]   E-Prescribing Status: Receipt confirmed by pharmacy (9/19/2023  2:18 PM CDT)     Printout Tracking    External Result Report     Medication Administration Instructions    Inject subcu 5 units with breakfast, lunch and dinner. Approx daily dose 15 units.     Pharmacy    [facility] - [place], [place] - [address]

## 2023-09-21 DIAGNOSIS — Z79.4 TYPE 2 DIABETES MELLITUS WITH HYPERGLYCEMIA, WITH LONG-TERM CURRENT USE OF INSULIN (H): ICD-10-CM

## 2023-09-21 DIAGNOSIS — E11.65 TYPE 2 DIABETES MELLITUS WITH HYPERGLYCEMIA, WITH LONG-TERM CURRENT USE OF INSULIN (H): ICD-10-CM

## 2023-09-21 NOTE — TELEPHONE ENCOUNTER
Please send new rx. Due to pt being  medicare part b can not transfer from another pharm. Pt needs asap please and thank you.    PRESCRIPTIONS MUST BE WRITTEN THIS WAY TO MAKE THEM MEDICARE COMPLIANT    FREESTYLE PH[place]EN[place] 2 SENSORS  SIG: Change every 14 days.  QTY: 2  REFILLS: 5    -Prescriptions must be written after the clinical note date and will only be able to be used for 6 months from the date of the clinical notes. All prescriptions must be from same provider who patient had visit with. (We will be requesting new clinical notes and prescriptions every 6 months to meet Medicare Guidelines.)    Please contact us at [phone] (this number is for clinics only) with any questions. Please only give [phone] to patients.    [facility]   [address] [place] [address]  Phone # [phone]  Fax # [phone]

## 2023-09-21 NOTE — PROGRESS NOTES
09/20/23 0500   Appointment Info   Signing clinician's name / credentials Dorene Boss PT   Visits Used 30 -Medicare/[employer] Choice   Medical Diagnosis Above knee amputation of left lower extremity; Wheelchair User   PT Tx Diagnosis Impaired mobility   Progress Note/Certification   Start of Care Date 12/09/22   Onset of illness/injury or Date of Surgery 09/11/89   Therapy Frequency 1x per week   Predicted Duration 90 days   Certification date from 08/02/23   Certification date to 10/30/23   KX Modifier Statement Therapy services meets medical necessity requirements beyond the therapy threshold for ongoing care.   PT Goal 1   Goal Identifier Standing tolerance   Goal Description Pt will be able to tolerate standing >5 min in parallel bars w/ single UE support in order to build weightbearing tolerance of R LE   Goal Progress eval: able to  // bars with heavy B UE support x30 sec.  2/15/23  able to stand 3x today less pressure on bars. sba to cga. 8/2/23: Can  // bars for 3 mins with 2 hand support. Pt is too nervous to remove 1 hand.   Target Date 10/30/23   PT Goal 2   Goal Identifier Transfers   Goal Description Pt will demonstrate proper mechanics of stand pivot transfer with walker from chair<>mat in order to improve safety with transfers at home   Goal Progress eval: see transfers in eval for description. 2/15/23 pt can do a squat pivot tx w/ sba but not stand pivot yet. 8/4/23: Working on building confidence to stand with WW - transfer not attempted yet.   Target Date 10/30/23   PT Goal 3   Goal Identifier HEP   Goal Description Pt will be inependent with HEP at least 4x/week in order to improve self management of symptoms   Target Date 10/30/23   Date Met 02/15/23   PT Goal 4   Goal Identifier prosthetic   Goal Description pt to walk 20ft w/ walker and new prosthetic L w/ supervision in home setting   Goal Progress 8/2/23: Pt amb 14' in // bars x3 with CGA and close w/c follow, increased  trunk flex throghout gait cycle.   Target Date 10/30/23   Subjective Report   Subjective Report saw Oleg prosthetist today and .....  walked w/ him.  it is better.  need to pull shorts out from prosthetic area. Caroline dtr w/ her.  she did not stand since last session.   Objective Measure 3   Objective Measure // bars, walking   Details 14ft x 1.  w/ 2ww 7ft x 3.  cga / min assist w/ 2ww.  and w/c follow.   Therapeutic Procedure/Exercise   Therapeutic Procedures: strength, endurance, ROM, flexibillity minutes (59131) 12   Skilled Intervention tx to mat sba attempted prone - kamar 2 min then too painful.  min assist to sit.  pt was exhausted, tx back to w/c.   Patient Response/Progress tired after O/P appt.   Gait Training   Gait Training Minutes, includes stair climbing (86390) 28   Skilled Intervention one ply sock today right after oleg saw pt prosthetics.  pt able to don prosthetic.  see gait dist above.  pt is tired after Oleg's appt   Patient Response/Progress kamar well   Treatment Detail gait 14ft in//bars.  7ftx 2 w/ 2ww.   see above.  pt can don doff prosthetic but needs min assist to tighten velcro strap.   Education   Learner/Method Patient   Education Comments stand at home.   Plan   Homework stand at home w/ family w / prosthetic on.   Home program reciprocal inhibition stretch of L hip flexor; cont to stand at kitchen counter.   seated w/c pushups.  ap's / laq on R, hip flexion on L seated.   bilat sidelying hip ext and supine hip abduction bilat done one at a time.  seated leg press w/ red band.   Plan for next session see Progress note.  cont weekly to every other week.  cont to work w/ prosthetist to improve standing and gait w/ 2ww. standing at home.   Total Session Time   Timed Code Treatment Minutes 40   Total Treatment Time (sum of timed and untimed services) 40

## 2023-09-21 NOTE — PROGRESS NOTES
PLAN  Continue therapy per current plan of care.  Pt has prosthesis now and needs skilled PT for gait/ mobility training.     Beginning/End Dates of Progress Note Reporting Period:    6/16/23 to 09/20/2023    Referring Provider:  Kamryn Lazo

## 2023-09-22 ENCOUNTER — OFFICE VISIT (OUTPATIENT)
Dept: ORTHOPEDICS | Facility: CLINIC | Age: 73
End: 2023-09-22
Payer: MEDICARE

## 2023-09-22 DIAGNOSIS — B35.1 OM (ONYCHOMYCOSIS): ICD-10-CM

## 2023-09-22 DIAGNOSIS — I73.89 OTHER SPECIFIED PERIPHERAL VASCULAR DISEASES (H): Primary | ICD-10-CM

## 2023-09-22 DIAGNOSIS — L84 TYLOMA: ICD-10-CM

## 2023-09-22 DIAGNOSIS — I73.9 PAD (PERIPHERAL ARTERY DISEASE) (H): ICD-10-CM

## 2023-09-22 DIAGNOSIS — Z89.612 S/P AKA (ABOVE KNEE AMPUTATION) UNILATERAL, LEFT (H): ICD-10-CM

## 2023-09-22 PROCEDURE — 11055 PARING/CUTG B9 HYPRKER LES 1: CPT | Mod: 59 | Performed by: PODIATRIST

## 2023-09-22 PROCEDURE — 99212 OFFICE O/P EST SF 10 MIN: CPT | Mod: 25 | Performed by: PODIATRIST

## 2023-09-22 PROCEDURE — 11720 DEBRIDE NAIL 1-5: CPT | Mod: XS | Performed by: PODIATRIST

## 2023-09-22 NOTE — LETTER
9/22/2023         RE: Supriya Herr  [address]  [facility] [address]        Dear Colleague,    Thank you for referring your patient, Supriya Herr, to the [facility] [place]. Please see a copy of my visit note below.    Chief Complaint   Patient presents with    RECHECK     Nails               Allergies   Allergen Reactions    Ampicillin-Sulbactam Sodium Rash     No evidence SJS, but very uncomfortable and precipitated multiple provider visits. Would not use penicillins again if other options available.     Penicillins Rash         Subjective: Supriya is a 73 year old female who presents to the clinic today for a diabetic foot exam and management.  Her nails do get long.  she has a history of amputation and from previous notes, we have documented that she has nonpalpable DP and PT pulses.     Objective    Hemoglobin A1C   Date Value Ref Range Status   04/21/2023 6.8 (H) 0.0 - 5.6 % Final     Comment:     Normal <5.7%   Prediabetes 5.7-6.4%    Diabetes 6.5% or higher     Note: Adopted from ADA consensus guidelines.   04/09/2021 6.2 (H) 0 - 5.6 % Final     Comment:     Normal <5.7% Prediabetes 5.7-6.4%  Diabetes 6.5% or higher - adopted from ADA   consensus guidelines.           Non-palpable DP and PT pulses L.   Equinus noted BL. Pes planus with rigid toe deformities noted to lesser digits on the right. Left AKA noted.   Nails are thickened, discolored, elongated, with subungual debris consistent with onychomycosis.          Assessment: DM2 with left AKA and neuropathy. She has severe vasculopathy.  Onychomycosis.   Tyloma     Plan:   - Pt seen and evaluated  - Nails debrided x 5.  - Tyloma debrided x 1  - Cont compression socks.  - See again in 3 months.      Eleazar Pack DPM

## 2023-09-22 NOTE — PROGRESS NOTES
Chief Complaint   Patient presents with    RECHECK     Nails               Allergies   Allergen Reactions    Ampicillin-Sulbactam Sodium Rash     No evidence SJS, but very uncomfortable and precipitated multiple provider visits. Would not use penicillins again if other options available.     Penicillins Rash         Subjective: Supriya is a 73 year old female who presents to the clinic today for a diabetic foot exam and management.  Her nails do get long.  she has a history of amputation and from previous notes, we have documented that she has nonpalpable DP and PT pulses.     Objective    Hemoglobin A1C   Date Value Ref Range Status   04/21/2023 6.8 (H) 0.0 - 5.6 % Final     Comment:     Normal <5.7%   Prediabetes 5.7-6.4%    Diabetes 6.5% or higher     Note: Adopted from ADA consensus guidelines.   04/09/2021 6.2 (H) 0 - 5.6 % Final     Comment:     Normal <5.7% Prediabetes 5.7-6.4%  Diabetes 6.5% or higher - adopted from ADA   consensus guidelines.           Non-palpable DP and PT pulses L.   Equinus noted BL. Pes planus with rigid toe deformities noted to lesser digits on the right. Left AKA noted.   Nails are thickened, discolored, elongated, with subungual debris consistent with onychomycosis.          Assessment: DM2 with left AKA and neuropathy. She has severe vasculopathy.  Onychomycosis.   Tyloma     Plan:   - Pt seen and evaluated  - Nails debrided x 5.  - Tyloma debrided x 1  - Cont compression socks.  - See again in 3 months.

## 2023-09-26 DIAGNOSIS — N18.6 ESRD (END STAGE RENAL DISEASE) ON DIALYSIS (H): ICD-10-CM

## 2023-09-26 DIAGNOSIS — M79.641 PAIN OF RIGHT HAND: ICD-10-CM

## 2023-09-26 DIAGNOSIS — Z99.2 ESRD (END STAGE RENAL DISEASE) ON DIALYSIS (H): ICD-10-CM

## 2023-09-29 RX ORDER — FUROSEMIDE 40 MG
40 TABLET ORAL DAILY
Qty: 60 TABLET | Refills: 11 | Status: SHIPPED | OUTPATIENT
Start: 2023-09-29 | End: 2024-01-30

## 2023-09-29 RX ORDER — GABAPENTIN 100 MG/1
100 CAPSULE ORAL 3 TIMES DAILY PRN
Qty: 90 CAPSULE | Refills: 11 | Status: SHIPPED | OUTPATIENT
Start: 2023-09-29 | End: 2024-02-26

## 2023-10-09 ENCOUNTER — TELEPHONE (OUTPATIENT)
Dept: FAMILY MEDICINE | Facility: CLINIC | Age: 73
End: 2023-10-09
Payer: MEDICARE

## 2023-10-09 NOTE — TELEPHONE ENCOUNTER
Needs to know how many meals and snacks the pt is having to bill insurance for the Renvela.      Please call the pharmacy back at [phone].    Attempted to call pt. Will send My chart message.     Dionne Lei RN  [facility]

## 2023-10-10 NOTE — TELEPHONE ENCOUNTER
Called pharmacy and updated per pt mychart message of 3 meals a day and 2-3 snacks.    Blaise Hernandez RN   [facility]

## 2023-10-23 ENCOUNTER — DOCUMENTATION ONLY (OUTPATIENT)
Dept: FAMILY MEDICINE | Facility: CLINIC | Age: 73
End: 2023-10-23
Payer: MEDICARE

## 2023-10-23 DIAGNOSIS — G47.34 IDIOPATHIC SLEEP RELATED NONOBSTRUCTIVE ALVEOLAR HYPOVENTILATION: Primary | ICD-10-CM

## 2023-10-24 DIAGNOSIS — E11.3213 TYPE 2 DIABETES MELLITUS WITH MILD NONPROLIFERATIVE RETINOPATHY OF BOTH EYES AND MACULAR EDEMA, UNSPECIFIED WHETHER LONG TERM INSULIN USE (H): Primary | ICD-10-CM

## 2023-10-25 ENCOUNTER — THERAPY VISIT (OUTPATIENT)
Dept: PHYSICAL THERAPY | Facility: CLINIC | Age: 73
End: 2023-10-25
Attending: PHYSICAL MEDICINE & REHABILITATION
Payer: MEDICARE

## 2023-10-25 DIAGNOSIS — R53.81 PHYSICAL DECONDITIONING: ICD-10-CM

## 2023-10-25 DIAGNOSIS — S78.112A ABOVE KNEE AMPUTATION OF LEFT LOWER EXTREMITY (H): Primary | ICD-10-CM

## 2023-10-25 PROCEDURE — 97530 THERAPEUTIC ACTIVITIES: CPT | Mod: GP,KX | Performed by: PHYSICAL THERAPIST

## 2023-10-25 PROCEDURE — 97116 GAIT TRAINING THERAPY: CPT | Mod: GP,KX | Performed by: PHYSICAL THERAPIST

## 2023-10-31 NOTE — PROGRESS NOTES
[facility]                                                                                   OUTPATIENT PHYSICAL THERAPY    PLAN OF TREATMENT FOR OUTPATIENT REHABILITATION   Patient's Last Name, First Name, Supriya Hernandez YOB: 1950   Provider's Name   [facility]   Medical Record No.  [id number]     Onset Date: 09/11/89  Start of Care Date: 12/09/22     Medical Diagnosis:   L AKA      PT Treatment Diagnosis:   immobility Plan of Treatment  Frequency/Duration3 to 4 x in next 2 months    Certification date from 10/25/23 to 12/29/23       See note for plan of treatment details and functional goals     Dorene Boss, LALO                         I CERTIFY THE NEED FOR THESE SERVICES FURNISHED UNDER        THIS PLAN OF TREATMENT AND WHILE UNDER MY CARE     (Physician attestation of this document indicates review and certification of the therapy plan).                Referring Provider:  Kamryn Lazo      Initial Assessment  See Epic Evaluation- Start of Care Date: 12/09/22            PLAN  Continue therapy per current plan of care.    Beginning/End Dates of Progress Note Reporting Period:    6/16/23 to 10/25/2023    Referring Provider:  Kamryn Lazo

## 2023-10-31 NOTE — PROGRESS NOTES
10/25/23 1550   Appointment Info   Signing clinician's name / credentials Dorene Boss PT   Visits Used 31  -Medicare/[employer] Choice   Progress Note/Certification   Start of Care Date 12/09/22   Onset of illness/injury or Date of Surgery 09/11/89   Therapy Frequency 1x per week   Predicted Duration 90 days   Certification date from 08/02/23   Certification date to 10/30/23   KX Modifier Statement Therapy services meets medical necessity requirements beyond the therapy threshold for ongoing care.   PT Goal 1   Goal Identifier Standing tolerance   Goal Description Pt will be able to tolerate standing >5 min in parallel bars w/ single UE support in order to build weightbearing tolerance of R LE   Goal Progress eval: able to  // bars with heavy B UE support x30 sec.  2/15/23  able to stand 3x today less pressure on bars. sba to cga. 8/2/23: Can  // bars for 3 mins with 2 hand support. Pt is too nervous to remove 1 hand.   Target Date 12/29/23   PT Goal 2   Goal Identifier Transfers   Goal Description Pt will demonstrate proper mechanics of stand pivot transfer with walker from chair<>mat in order to improve safety with transfers at home   Goal Progress eval: see transfers in eval for description. 2/15/23 pt can do a squat pivot tx w/ sba but not stand pivot yet. 8/4/23: Working on building confidence to stand with WW - transfer not attempted yet.   Target Date 12/29/23   PT Goal 3   Goal Identifier HEP   Goal Description Pt will be inependent with HEP at least 4x/week in order to improve self management of symptoms   Goal Progress 8/2/23: Pt notes she has not been consistent about doing this. Has been actively participating in PT when she attends, but has not been following through with strength or stretching recommendations. Pt states she will work on that this week.   Target Date 10/30/23   Date Met 02/15/23   PT Goal 4   Goal Identifier prosthetic   Goal Description pt to walk 20ft w/ walker and  "new prosthetic L w/ supervision in home setting   Goal Progress 8/2/23: Pt amb 14' in // bars x3 with CGA and close w/c follow, increased trunk flex throghout gait cycle.   Target Date 12/29/23   Subjective Report   Subjective Report dtr and pt report - not doing standing at home.  \"Can i take a break?\" from PT and Oleg.   Therapeutic Procedure/Exercise   Therapeutic Procedures: strength, endurance, ROM, flexibillity minutes (44846) 26   Skilled Intervention educ re; option of talking to dr Jackson re; mood/ meds.pt admits to having depression.   standing w / 2ww x2  sba to cga only - no assist. stood x 30 sec x 2.  able to get prosthetic on /off sba.   Patient Response/Progress needs to talk to dr MCMAHON re; mood.   Gait Training   Gait Training Minutes, includes stair climbing (87359) 20   Treatment Detail gait in //bars. able to walk 8ft, 19ft and 8 ft. w/ prosthetic on .  cga. dtr following w/ w/c.   Progress akmar well; needs to do more standing at home.  mood interfering.   Education   Learner/Method Patient;Family   Education Comments talk to dr Jackson re; mood and ??meds   Plan   Homework talk to PCP, HEP   Home program reciprocal inhibition stretch of L hip flexor; cont to stand at kitchen counter.   seated w/c pushups.  ap's / laq on R, hip flexion on L seated.   bilat sidelying hip ext and supine hip abduction bilat done one at a time.  seated leg press w/ red band.   Plan for next session 2 remaining session then take a winter break.  return in spring if desired.   Comments   Comments dialysis T Th sat.   Dtr ginny chris,  nephew Williams Ahn.  KX modifier needed, skilled PT remains vital.   Total Session Time   Timed Code Treatment Minutes 46   Total Treatment Time (sum of timed and untimed services) 46       "

## 2023-11-01 ENCOUNTER — OFFICE VISIT (OUTPATIENT)
Dept: OPHTHALMOLOGY | Facility: CLINIC | Age: 73
End: 2023-11-01
Attending: OPHTHALMOLOGY
Payer: MEDICARE

## 2023-11-01 DIAGNOSIS — E11.3213 TYPE 2 DIABETES MELLITUS WITH MILD NONPROLIFERATIVE RETINOPATHY OF BOTH EYES AND MACULAR EDEMA, UNSPECIFIED WHETHER LONG TERM INSULIN USE (H): ICD-10-CM

## 2023-11-01 PROCEDURE — 92235 FLUORESCEIN ANGRPH MLTIFRAME: CPT | Performed by: OPHTHALMOLOGY

## 2023-11-01 PROCEDURE — 92134 CPTRZ OPH DX IMG PST SGM RTA: CPT | Performed by: OPHTHALMOLOGY

## 2023-11-01 PROCEDURE — 99214 OFFICE O/P EST MOD 30 MIN: CPT | Performed by: OPHTHALMOLOGY

## 2023-11-01 PROCEDURE — G0463 HOSPITAL OUTPT CLINIC VISIT: HCPCS | Mod: 25 | Performed by: OPHTHALMOLOGY

## 2023-11-01 ASSESSMENT — REFRACTION_WEARINGRX
SPECS_TYPE: BIFOCAL
OS_ADD: +2.50
OS_AXIS: 130
OD_CYLINDER: +1.00
OS_CYLINDER: +1.75
OS_SPHERE: -4.00
OD_ADD: +2.50
OD_SPHERE: -1.00
OD_AXIS: 072

## 2023-11-01 ASSESSMENT — EXTERNAL EXAM - RIGHT EYE: OD_EXAM: NORMAL

## 2023-11-01 ASSESSMENT — TONOMETRY
OD_IOP_MMHG: 13
IOP_METHOD: TONOPEN
OS_IOP_MMHG: 13

## 2023-11-01 ASSESSMENT — PATIENT HEALTH QUESTIONNAIRE - PHQ9: SUM OF ALL RESPONSES TO PHQ QUESTIONS 1-9: 10

## 2023-11-01 ASSESSMENT — CUP TO DISC RATIO
OS_RATIO: 0.3
OD_RATIO: 0.3

## 2023-11-01 ASSESSMENT — SLIT LAMP EXAM - LIDS
COMMENTS: NORMAL
COMMENTS: NORMAL

## 2023-11-01 ASSESSMENT — VISUAL ACUITY
OD_CC: 20/50
OS_PH_CC: 20/30
OS_CC+: -2
OD_PH_CC+: +2
OS_CC: 20/30
OD_CC+: -1
OD_PH_CC: 20/50
METHOD: SNELLEN - LINEAR

## 2023-11-01 NOTE — PROGRESS NOTES
CC: follow up  NPDR and DME.   HPI: 73 year old female with history of  NPDR and DME.   Interim: no changes in vision;  No new flashes or floaters; no eye pain  Imaging:  Optical Coherence Tomography: 11/01/2  right eye: central stable lamellar hole. Mild  IRF, choroid normal, hyaloid . Mild Epiretinal membrane.   Left eye: irregular fovea, irregular inner retinal contour, mild IRF rashid nasally, microaneurysms. stable    fluorescein angiography: 11/01/23    Right eye: blockage of fluorescein angiography on the areas of heme; microaneurysms; mild late Diabetic macular edema and PP leakage.  Few capillary non perfusion   Left eye: microaneurysms; mild late Diabetic macular edema; staining of the peripheral Chorioretinal  scars. Few capillary non perfusion   Stable  No neovascularization elsewhere; no neovascularization of the disc.    Assessment & Plan:    # Moderate nonproliferative diabetic retinopathy both eyes   - stable.    # mild Diabetic macular edema both eyes  - stable  observe    # H/o right eye - retinal macroaneurysm    - at the sup temp margin of disc may contribute to macular edema;    - status post avastin inj x2 for cystoid macular edema with improvement   - No longer present- previously observed to be thrombosing   - Last DOROTEO 8/15/19 (#1)    #. Mod Hypertensive retinopathy both eyes   - Stage 5 kidney disease   - blood pressure control has been poor in 160s/90s-100s  - improved control per patient recently     # Pseudophakia both eyes  8-11-20 right eye   8-17-22 YAG OD    #. History of Macula hole repair left eye by Dr. Daniel  S/p PPV, mp, air-fluid exchange, SF6 gas infusion, left eye 2/14/2012 by Dr. Daniel    - residual lamellar hole, but outer retina closed  08/17/22  Peripheral Chorioretinal  Scars with  IN area of shallow elevation anterior to the scars. questionable shallow SRF with demarcation line, possible old laser and not extending posteriorly.   These are chronic findings and  present in prior optos photos from 2019.   Stable, unchanged 04/26/23   Will monitor.      # Dry eye syndrome both eyes   Status post punctal plugs   artificial tears  As needed and warm compresses     PLAN:   - Blood pressure (<120/80) and blood glucose (HbA1c <7.0) control discussed with patient.   - Patient advised that failure to adequately control each may lead to vision loss. The patient expressed understanding.  - follow up in 4-6 months with Optical Coherence Tomography both eyes    ~~~~~~~~~~~~~~~~~~~~~~~~~~~~~~~~~~   Complete documentation of historical and exam elements from today's encounter can be found in the full encounter summary report (not reduplicated in this progress note).  I personally obtained the chief complaint(s) and history of present illness.  I confirmed and edited as necessary the review of systems, past medical/surgical history, family history, social history, and examination findings as documented by others; and I examined the patient myself.  I personally reviewed the relevant tests, images, and reports as documented above.  I formulated and edited as necessary the assessment and plan and discussed the findings and management plan with the patient and family    Leanne Jett MD   of Ophthalmology.  Retina Service   Department of Ophthalmology and Visual Neurosciences   [facility]  Phone: [phone]   Fax: [phone]

## 2023-11-01 NOTE — NURSING NOTE
Chief Complaints and History of Present Illnesses   Patient presents with    Diabetic Eye Exam Follow Up     Chief Complaint(s) and History of Present Illness(es)       Diabetic Eye Exam Follow Up              Associated symptoms: tearing (RE>LE).  Negative for photophobia    Pain scale: 0/10              Comments    Supriya is here to continue care for type 2 diabetes with mellitis with nonproliferative retinopathy. Today she states no vision changes  since last visit.     Lab Results       Component                Value               Date                       A1C                      6.8                 04/21/2023                 A1C                      6.2                 11/23/2021                 A1C                      6.2                 04/09/2021                 A1C                      6.0                 06/22/2018                 A1C                      5.4                 03/29/2018                 A1C                      6.8                 01/09/2018                 A1C                      9.6                 06/09/2017        Dixon Agustin COT 3:26 PM November 1, 2023

## 2023-11-06 ENCOUNTER — MYC MEDICAL ADVICE (OUTPATIENT)
Dept: DERMATOLOGY | Facility: CLINIC | Age: 73
End: 2023-11-06
Payer: MEDICARE

## 2023-11-06 ENCOUNTER — TELEPHONE (OUTPATIENT)
Dept: TRANSPLANT | Facility: CLINIC | Age: 73
End: 2023-11-06
Payer: MEDICARE

## 2023-11-06 DIAGNOSIS — L30.4 INTERTRIGO: ICD-10-CM

## 2023-11-06 PROBLEM — F33.9 MAJOR DEPRESSIVE DISORDER, RECURRENT (H): Status: ACTIVE | Noted: 2018-09-26

## 2023-11-06 NOTE — PROGRESS NOTES
Patient is showing 4/5 MNCM met. Aspirin not prescribed   Outcome for 11/14/23 1:06 PM :Left Voicemail for patient to call back Daughter Caroline Gray's phone number.  Sandy Galindo  Outcome for 11/14/23 1:08 PM :Sent patient gestigon message asking them to upload their BG data  Sandyolman Galindo  Outcome for 11/15/23 12:03 PM: Data obtained via phone and located below  Gracie Perez LPN         11/15  104    11/14  121    11/13  168    11/12  112  203  136    11/11  226  121  133    11/10  102    11/09  99    11/08  91  89    11/07  142  151  109  129    11/06  122  119    11/5  169  124  389    11/04  190  229  137  135    Virtual Visit Details    Type of service:  Video Visit   Video Start Time:   Video End Time:    Originating Location (pt. Location):   istant Location (provider location):    Platform used for Video Visit:

## 2023-11-06 NOTE — TELEPHONE ENCOUNTER
Patient Call: General  Route to LPN    Reason for call: Caroline returning coordinator's call    Call back needed? Yes    Return Call Needed  Same as documented in contacts section  When to return call?: Same day: Route High Priority

## 2023-11-06 NOTE — TELEPHONE ENCOUNTER
Assessment & Plan: 6/23/2023     # Psoriasis, chronic, active.   Doing okay currently, still active but better when able to use topicals. Will try start Protopic for steroid-sparing agent and continue Otezla. If becoming more bothersome, can discuss alternate systemic with nephrology.  - Continue Otezla 30 mg daily (renally dosed; on dialysis)  - Start Protopic ointment BID prn   *reviewed Cr last 1/2023, elevated in setting of known ESRD on dialysis     # Intertigo, chronic.  - Continue miconazole 2% powder BID to affected skin fold areas  - Add protopic as above     # Buttock/groin irritant dermatitis/pressure erythema.  - Vaseline daily  - When red/irritated, use TMC 0.025% ointment BID for 2-4 weeks at at time, then transition to Protopic if not too irritating  - Hold on calmoseptine for now given other additional fragrances/preservatives etc      # Reaction to adhesive for glucose sensor.  - Can try TMC 0.1% cream BID prn     # Milia  # Stewart angiomas  # Seborrheic keratoses  - Reassured of benign nature, no treatment necessary     # Multiple benign nevi  - No concerning lesions today  - Monitor for ABCDEs of melanoma   - Continue sun protection - recommend SPF 30 or higher with frequent application   - Return sooner if noticing changing or symptomatic lesions      # History of NMSC. No evidence of recurrent disease.  - Continue photoprotection - recommend SPF 30 or higher with frequent reapplication  - Continue yearly skin exams  - Advised to monitor for changing, non-healing, bleeding, painful, changing, or otherwise symptomatic lesions        Procedures Performed:   None     Follow-up: 6 month(s) in-person, or earlier for new or changing lesions

## 2023-11-07 ENCOUNTER — VIRTUAL VISIT (OUTPATIENT)
Dept: FAMILY MEDICINE | Facility: CLINIC | Age: 73
End: 2023-11-07
Payer: MEDICARE

## 2023-11-07 DIAGNOSIS — I10 ESSENTIAL HYPERTENSION: ICD-10-CM

## 2023-11-07 DIAGNOSIS — E11.40 TYPE 2 DIABETES MELLITUS WITH DIABETIC NEUROPATHY, WITH LONG-TERM CURRENT USE OF INSULIN (H): ICD-10-CM

## 2023-11-07 DIAGNOSIS — E55.9 VITAMIN D DEFICIENCY: ICD-10-CM

## 2023-11-07 DIAGNOSIS — Z79.4 TYPE 2 DIABETES MELLITUS WITH DIABETIC NEUROPATHY, WITH LONG-TERM CURRENT USE OF INSULIN (H): ICD-10-CM

## 2023-11-07 DIAGNOSIS — F43.23 ADJUSTMENT DISORDER WITH MIXED ANXIETY AND DEPRESSED MOOD: Primary | ICD-10-CM

## 2023-11-07 DIAGNOSIS — F41.1 GAD (GENERALIZED ANXIETY DISORDER): ICD-10-CM

## 2023-11-07 PROCEDURE — 99214 OFFICE O/P EST MOD 30 MIN: CPT | Mod: VID | Performed by: FAMILY MEDICINE

## 2023-11-07 RX ORDER — SERTRALINE HYDROCHLORIDE 25 MG/1
TABLET, FILM COATED ORAL
Qty: 180 TABLET | Refills: 6 | Status: SHIPPED | OUTPATIENT
Start: 2023-11-07 | End: 2023-11-09

## 2023-11-07 NOTE — PROGRESS NOTES
"Supriya is a 73 year old who is being evaluated via a billable video visit.      How would you like to obtain your AVS? MyChart  If the video visit is dropped, the invitation should be resent by: Text to cell phone: [phone]  Will anyone else be joining your video visit? No          Assessment & Plan     Adjustment disorder with mixed anxiety and depressed mood  Worse   Try sun lamp/ consdier therapy  - CBC with platelets; Future  - TSH with free T4 reflex; Future  - Hemoglobin A1c; Future    Type 2 diabetes mellitus with diabetic neuropathy, with long-term current use of insulin (H)  Well controlled but recheck  - Comprehensive metabolic panel (BMP + Alb, Alk Phos, ALT, AST, Total. Bili, TP); Future    Vitamin D deficiency  recheck  - Vitamin D Deficiency; Future    Essential hypertension  Well controlled     NICOLE (generalized anxiety disorder)  Worse  Increasse to 75 mg  - sertraline (ZOLOFT) 25 MG tablet; TAKE 2 TABLET BY MOUTH EVERY DAY  - sertraline (ZOLOFT) 50 MG tablet; Take 1 tablet (50 mg) by mouth at bedtime             BMI:   Estimated body mass index is 34.86 kg/m  as calculated from the following:    Height as of 4/21/23: 1.676 m (5' 6\").    Weight as of 4/21/23: 98 kg (216 lb).       See Patient Instructions    Noam Jackson MD  [facility]    Jean Carlos Epps is a 73 year old, presenting for the following health issues:  No chief complaint on file.      HPI             Review of Systems   Constitutional, HEENT, cardiovascular, pulmonary, gi and gu systems are negative, except as otherwise noted.      Objective           Vitals:  No vitals were obtained today due to virtual visit.    Physical Exam   GENERAL: alert and fatigued  EYES: Eyes grossly normal to inspection.  No discharge or erythema, or obvious scleral/conjunctival abnormalities.  RESP: No audible wheeze, cough, or visible cyanosis.  No visible retractions or increased work of breathing.    SKIN: " Visible skin clear. No significant rash, abnormal pigmentation or lesions.  NEURO: Cranial nerves grossly intact.  Mentation and speech appropriate for age.  PSYCH: Mentation appears normal, affect normal/bright, judgement and insight intact, normal speech and appearance well-groomed.    Office Visit on 04/21/2023   Component Date Value Ref Range Status    Hemoglobin A1C 04/21/2023 6.8 (H)  0.0 - 5.6 % Final    Normal <5.7%   Prediabetes 5.7-6.4%    Diabetes 6.5% or higher     Note: Adopted from ADA consensus guidelines.    Cholesterol 04/21/2023 113  <200 mg/dL Final    Triglycerides 04/21/2023 122  <150 mg/dL Final    Direct Measure HDL 04/21/2023 50  >=50 mg/dL Final    LDL Cholesterol Calculated 04/21/2023 39  <=100 mg/dL Final    Non HDL Cholesterol 04/21/2023 63  <130 mg/dL Final    TSH 04/21/2023 2.36  0.30 - 4.20 uIU/mL Final    Protein Total 04/21/2023 7.4  6.4 - 8.3 g/dL Final    Albumin 04/21/2023 4.1  3.5 - 5.2 g/dL Final    Bilirubin Total 04/21/2023 0.2  <=1.2 mg/dL Final    Alkaline Phosphatase 04/21/2023 56  35 - 104 U/L Final    AST 04/21/2023 19  10 - 35 U/L Final    ALT 04/21/2023 21  10 - 35 U/L Final    Bilirubin Direct 04/21/2023 <0.20  0.00 - 0.30 mg/dL Final               Video-Visit Details    Type of service:  Video Visit   Video Start Time:  510  Video End Time: 530    Originating Location (pt. Location): Home    Distant Location (provider location):  On-site  Platform used for Video Visit: Marisa

## 2023-11-09 DIAGNOSIS — F41.1 GAD (GENERALIZED ANXIETY DISORDER): ICD-10-CM

## 2023-11-09 RX ORDER — SERTRALINE HYDROCHLORIDE 25 MG/1
TABLET, FILM COATED ORAL
Qty: 180 TABLET | Refills: 6 | Status: SHIPPED | OUTPATIENT
Start: 2023-11-09 | End: 2023-11-10

## 2023-11-10 DIAGNOSIS — F41.1 GAD (GENERALIZED ANXIETY DISORDER): ICD-10-CM

## 2023-11-10 RX ORDER — SERTRALINE HYDROCHLORIDE 25 MG/1
TABLET, FILM COATED ORAL
Qty: 90 TABLET | Refills: 1 | Status: SHIPPED | OUTPATIENT
Start: 2023-11-10 | End: 2024-05-22

## 2023-11-10 NOTE — TELEPHONE ENCOUNTER
Patient's insurance will not cover the Sertraline 25mg BID. We are requesting a new rx for:    Sertraline 25mg  1QD    Please send new rx. Thank you

## 2023-11-14 ENCOUNTER — TELEPHONE (OUTPATIENT)
Dept: FAMILY MEDICINE | Facility: CLINIC | Age: 73
End: 2023-11-14
Payer: MEDICARE

## 2023-11-14 NOTE — TELEPHONE ENCOUNTER
Calling stating daughter was going to send her updated med list but she couldn't find online med list and so is wondering if clinic could send updated med list. Printed med list and faxed to number below.    Fax number: [phone]    Callback: [phone]    Thanks!  Blaise Hernandez RN   [facility]

## 2023-11-15 ENCOUNTER — VIRTUAL VISIT (OUTPATIENT)
Dept: ENDOCRINOLOGY | Facility: CLINIC | Age: 73
End: 2023-11-15
Payer: MEDICARE

## 2023-11-15 DIAGNOSIS — Z79.4 TYPE 2 DIABETES MELLITUS WITH HYPERGLYCEMIA, WITH LONG-TERM CURRENT USE OF INSULIN (H): Primary | ICD-10-CM

## 2023-11-15 DIAGNOSIS — E11.65 TYPE 2 DIABETES MELLITUS WITH HYPERGLYCEMIA, WITH LONG-TERM CURRENT USE OF INSULIN (H): Primary | ICD-10-CM

## 2023-11-15 PROCEDURE — 99214 OFFICE O/P EST MOD 30 MIN: CPT | Mod: VID | Performed by: PHYSICIAN ASSISTANT

## 2023-11-15 ASSESSMENT — PAIN SCALES - GENERAL: PAINLEVEL: NO PAIN (0)
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
## 2023-11-15 NOTE — NURSING NOTE
Is the patient currently in the state of [place]? YES    Visit mode:VIDEO    If the visit is dropped, the patient can be reconnected by: VIDEO VISIT: Text to cell phone:   Telephone Information:   Mobile [phone]       Will anyone else be joining the visit? Yes- daughter Caroline Gray  (If patient encounters technical issues they should call [phone])    How would you like to obtain your AVS? MyChart    Are changes needed to the allergy or medication list? Pt stated no changes to allergies and Pt stated no med changes    Reason for visit: Follow Up    Gracie Perez LPN LPN
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
## 2023-11-19 ENCOUNTER — HEALTH MAINTENANCE LETTER (OUTPATIENT)
Age: 73
End: 2023-11-19

## 2023-11-22 ENCOUNTER — MYC MEDICAL ADVICE (OUTPATIENT)
Dept: ENDOCRINOLOGY | Facility: CLINIC | Age: 73
End: 2023-11-22
Payer: MEDICARE

## 2023-11-22 NOTE — TELEPHONE ENCOUNTER
Left Voicemail (1st Attempt) and Sent Mychart (1st Attempt) for the patient to call back and schedule the following:    Appointment type: return diabetes  Provider: Twila Kamara  Return date: 6 months (around 5/15/24)  Specialty phone number: [phone]  Additional appointment(s) needed: NA  Additonal Notes: NA

## 2023-11-27 ENCOUNTER — TELEPHONE (OUTPATIENT)
Dept: ENDOCRINOLOGY | Facility: CLINIC | Age: 73
End: 2023-11-27
Payer: MEDICARE

## 2023-11-27 ENCOUNTER — MYC MEDICAL ADVICE (OUTPATIENT)
Dept: ENDOCRINOLOGY | Facility: CLINIC | Age: 73
End: 2023-11-27
Payer: MEDICARE

## 2023-11-27 DIAGNOSIS — Z79.4 TYPE 2 DIABETES MELLITUS WITH DIABETIC NEUROPATHY, WITH LONG-TERM CURRENT USE OF INSULIN (H): Primary | ICD-10-CM

## 2023-11-27 DIAGNOSIS — E11.40 TYPE 2 DIABETES MELLITUS WITH DIABETIC NEUROPATHY, WITH LONG-TERM CURRENT USE OF INSULIN (H): Primary | ICD-10-CM

## 2023-11-27 NOTE — TELEPHONE ENCOUNTER
Left Voicemail (2nd Attempt) and Sent Mychart (2nd Attempt) for the patient to call back and schedule the following:    Appointment type: return diabetes  Provider: Twila Kamara  Return date: 6 months - 5/2024  Specialty phone number: [phone]  Additional appointment(s) needed: NA  Additonal Notes: NA

## 2023-11-27 NOTE — TELEPHONE ENCOUNTER
[facility] Call Center    Phone Message    May a detailed message be left on voicemail: yes     Reason for Call: Other: Pt requesting call back, pt is needing to discuss getting the part of the freestyle sara 2 that takes the test on the arm

## 2023-12-01 ENCOUNTER — TELEPHONE (OUTPATIENT)
Dept: TRANSPLANT | Facility: CLINIC | Age: 73
End: 2023-12-01
Payer: MEDICARE

## 2023-12-01 DIAGNOSIS — N18.6 ESRD (END STAGE RENAL DISEASE) (H): Primary | ICD-10-CM

## 2023-12-01 DIAGNOSIS — Z76.82 ORGAN TRANSPLANT CANDIDATE: ICD-10-CM

## 2023-12-01 NOTE — TELEPHONE ENCOUNTER
Called pts daughter to update reviewed with Dr. Banks, will bring pt in in about 1-2 months for a check in. Will have lab appt as well for PRA. Left VM with direct line for return call.

## 2023-12-01 NOTE — TELEPHONE ENCOUNTER
Called to check in on PT. She was doing well but has put it on hold for now. Pt has had low energy and has put PT on hold. Can stand with prothesis when she has walker/ support. Unable to walk fully yet.

## 2023-12-05 NOTE — PROGRESS NOTES
10/25/23 1550   Appointment Info   Signing clinician's name / credentials Dorene Boss PT   Visits Used 31  -Medicare/[employer] Choice   Progress Note/Certification   Start of Care Date 12/09/22   Onset of illness/injury or Date of Surgery 09/11/89   Therapy Frequency 1x per week   Predicted Duration 90 days   Certification date from 08/02/23   Certification date to 10/30/23   KX Modifier Statement Therapy services meets medical necessity requirements beyond the therapy threshold for ongoing care.   PT Goal 1   Goal Identifier Standing tolerance   Goal Description Pt will be able to tolerate standing >5 min in parallel bars w/ single UE support in order to build weightbearing tolerance of R LE   Goal Progress eval: able to  // bars with heavy B UE support x30 sec.  2/15/23  able to stand 3x today less pressure on bars. sba to cga. 8/2/23: Can  // bars for 3 mins with 2 hand support. Pt is too nervous to remove 1 hand.   Target Date 12/29/23   PT Goal 2   Goal Identifier Transfers   Goal Description Pt will demonstrate proper mechanics of stand pivot transfer with walker from chair<>mat in order to improve safety with transfers at home   Goal Progress eval: see transfers in eval for description. 2/15/23 pt can do a squat pivot tx w/ sba but not stand pivot yet. 8/4/23: Working on building confidence to stand with WW - transfer not attempted yet.. 10/25/23 pt can stand approx 1 min w/ walker and sba   Target Date 12/29/23   PT Goal 3   Goal Identifier HEP   Goal Description Pt will be inependent with HEP at least 4x/week in order to improve self management of symptoms   Goal Progress 8/2/23: Pt notes she has not been consistent about doing this. Has been actively participating in PT when she attends, but has not been following through with strength or stretching recommendations. Pt states she will work on that this week.   Target Date 10/30/23   Date Met 02/15/23   PT Goal 4   Goal Identifier  "prosthetic   Goal Description pt to walk 20ft w/ walker and new prosthetic L w/ supervision in home setting   Goal Progress 8/2/23: Pt amb 14' in // bars x3 with CGA and close w/c follow, increased trunk flex throghout gait cycle.   Target Date 12/29/23   Subjective Report   Subjective Report dtr and pt report - not doing standing at home.  \"Can i take a break?\" from PT and Oleg.   Therapeutic Procedure/Exercise   Therapeutic Procedures: strength, endurance, ROM, flexibillity minutes (26254) 26   Skilled Intervention educ re; option of talking to dr Jackson re; mood/ meds.pt admits to having depression.   standing w / 2ww x2  sba to cga only - no assist. stood x 30 sec x 2.  able to get prosthetic on /off sba.   Patient Response/Progress needs to talk to dr MCMAHON re; mood.   Gait Training   Gait Training Minutes, includes stair climbing (75150) 20   Treatment Detail gait in //bars. able to walk 8ft, 19ft and 8 ft. w/ prosthetic on .  cga. dtr following w/ w/c.   Progress kamar well; needs to do more standing at home.  mood interfering.   Education   Learner/Method Patient;Family   Education Comments talk to dr Jackson re; mood and ??meds   Plan   Homework talk to PCP, HEP   Home program reciprocal inhibition stretch of L hip flexor; cont to stand at kitchen counter.   seated w/c pushups.  ap's / laq on R, hip flexion on L seated.   bilat sidelying hip ext and supine hip abduction bilat done one at a time.  seated leg press w/ red band.   Plan for next session 2 remaining session then take a winter break.  return in spring if desired. LATE ENTRY 12/5/23 update- pt canceled remaining sessions.  working w/ PCP re; mood.  dtr states let's wait until Feb/ march to cont PT. will get a new order.   Comments   Comments dialysis T Th sat.   Dtr ginny chris,  nephew Williams philippe Jimy.  KX modifier needed, skilled PT remains vital.   Total Session Time   Timed Code Treatment Minutes 46   Total Treatment Time (sum of timed and " untimed services) 46

## 2023-12-11 NOTE — TELEPHONE ENCOUNTER
Forms have been completed and faxed back to [employer] @ [phone] , also placed into abstraction to scan into patients chart.        Pao Elizondo MA    
Reason for Call:  Form, our goal is to have forms completed with 72 hours, however, some forms may require a visit or additional information.    Type of letter, form or note:  medical Certification of Need for [employer]ation     Who is the form from?: [employer] (if other please explain)    Where did the form come from: form was faxed in    What clinic location was the form placed at?: [facility]    Where the form was placed: Dr. Jackson's  Box/Folder    What number is listed as a contact on the form?:   Phone: [phone]       Additional comments: Please complete, sign, date and email back to [email or web address]    No fax listed on form only email address and phone number     Call taken on 9/7/2021 at 2:45 PM by Shahida Bowman        
11-Dec-2023 04:40

## 2023-12-13 NOTE — LETTER
[facility] CARE COORDINATION  [facility]  [address]  [facility] [address]    November 30, 2021    Supriya Herr  [address]  [facility] [address]      aNvjot Epps and Caroline,    Thank you for taking the time to speak with me today. Below I have included the resources which we discussed.     Here are some transportation resources for your to look over and see if they would meet your needs:    Safe Ride [facility]: [phone] Medical transport: Accept medical assistance or private pay  Non wheelchair is $18 non wheelchair or $25 wheelchair $1.50  per mile    [employer]:[email or web address], [phone]    COVID BOOSTER INFORMATION:     [facility] pharmacy ([phone]) does have COVID booster appointments available on 12/8/21 from 11-2:45pm, 3:45-5:00pm. Please call the pharmacy to schedule an appointment for Supriya, if that works with your schedules.    If you have any further questions, do not hesitate to contact me.    Dina Valdes  Community Health Worker  [facility]  [phone]       PT CALLED TO REPORT SHE SAW JESUSITA ON 12/1. SHE HAS COMPLETED THE ZPAK AND PREDNISONE. SHE FEELS BETTER BUT STILL HAS A COUGH AND SINUS CONGESTION. PLEASE ADVISE.

## 2023-12-14 DIAGNOSIS — L40.8 INVERSE PSORIASIS: ICD-10-CM

## 2023-12-14 RX ORDER — TRIAMCINOLONE ACETONIDE 0.25 MG/G
OINTMENT TOPICAL 2 TIMES DAILY
Qty: 80 G | Refills: 11 | Status: SHIPPED | OUTPATIENT
Start: 2023-12-14

## 2023-12-18 DIAGNOSIS — Z79.4 TYPE 2 DIABETES MELLITUS WITH HYPERGLYCEMIA, WITH LONG-TERM CURRENT USE OF INSULIN (H): ICD-10-CM

## 2023-12-18 DIAGNOSIS — E11.65 TYPE 2 DIABETES MELLITUS WITH HYPERGLYCEMIA, WITH LONG-TERM CURRENT USE OF INSULIN (H): ICD-10-CM

## 2023-12-18 RX ORDER — PEN NEEDLE, DIABETIC 32GX 5/32"
NEEDLE, DISPOSABLE MISCELLANEOUS
Qty: 400 EACH | Refills: 1 | Status: SHIPPED | OUTPATIENT
Start: 2023-12-18

## 2023-12-20 ENCOUNTER — TELEPHONE (OUTPATIENT)
Dept: WOUND CARE | Facility: HOSPITAL | Age: 73
End: 2023-12-20
Payer: MEDICARE

## 2023-12-20 ENCOUNTER — TELEPHONE (OUTPATIENT)
Dept: WOUND CARE | Facility: CLINIC | Age: 73
End: 2023-12-20
Payer: MEDICARE

## 2023-12-20 NOTE — TELEPHONE ENCOUNTER
[facility]    Who is the name of the provider?:  Dat    What is the location you see this provider at/preferred location?: [place] or [place]  Person calling / Facility: Caroline Gray - daughter   Phone number:  [phone]   Nurse call back needed:  ??     Reason for call:  Last seen 4/23 in [place].  Requesting appointment for new open, bleeding coccyx wound.  No sign of infection.  Prefer [place] but will take [place] if sooner appointment available.      Pharmacy location:  NA  Outside Imaging: NA   Can we leave a detailed message on this number?  YES

## 2023-12-20 NOTE — TELEPHONE ENCOUNTER
Please schedule with Dr. Daugherty, Kelly Boateng PA-C, or Nano Etienne NP at [facility] [place] for next available appointment.    **For all providers, Kelly Lanier PA-C, Dr. Vargas, Nano Etienne NP or Dr. Daugherty, please schedule a follow up 2-3 weeks after initial appointment.**  --If unable to schedule within 2-3 weeks then please place on cancellation list--    Is the patient able to make their own medical decisions? Yes    Can the patient be scheduled on a Thursday? Yes if the patient is ok with being seen in a chair laid flat     Is patient a ASHLEY lift? PLEASE INQUIRE WHEN MAKING THE APPOINTMENT AND PUT IN APPOINTMENT NOTES    Routing to  Wound Healing Scheduling.

## 2023-12-22 ENCOUNTER — OFFICE VISIT (OUTPATIENT)
Dept: ORTHOPEDICS | Facility: CLINIC | Age: 73
End: 2023-12-22
Payer: MEDICARE

## 2023-12-22 DIAGNOSIS — I73.89 OTHER SPECIFIED PERIPHERAL VASCULAR DISEASES (H): Primary | ICD-10-CM

## 2023-12-22 DIAGNOSIS — B35.1 OM (ONYCHOMYCOSIS): ICD-10-CM

## 2023-12-22 DIAGNOSIS — I73.9 PAD (PERIPHERAL ARTERY DISEASE) (H): ICD-10-CM

## 2023-12-22 PROCEDURE — 11720 DEBRIDE NAIL 1-5: CPT | Mod: Q8 | Performed by: PODIATRIST

## 2023-12-22 PROCEDURE — 99213 OFFICE O/P EST LOW 20 MIN: CPT | Mod: 25 | Performed by: PODIATRIST

## 2023-12-22 NOTE — LETTER
12/22/2023         RE: Supriya Herr  [address]  [facility] [address]        Dear Colleague,    Thank you for referring your patient, Supriya Herr, to the [facility] [place]. Please see a copy of my visit note below.    Chief Complaint   Patient presents with    Follow Up     Nails.              Allergies   Allergen Reactions    Ampicillin-Sulbactam Sodium Rash     No evidence SJS, but very uncomfortable and precipitated multiple provider visits. Would not use penicillins again if other options available.     Penicillins Rash         Subjective: Supriya is a 73 year old female who presents to the clinic today for a diabetic foot exam and management.  Her nails do get long.  she has a history of amputation and from previous notes, we have documented that she has nonpalpable DP and PT pulses.     Objective    Hemoglobin A1C   Date Value Ref Range Status   04/21/2023 6.8 (H) 0.0 - 5.6 % Final     Comment:     Normal <5.7%   Prediabetes 5.7-6.4%    Diabetes 6.5% or higher     Note: Adopted from ADA consensus guidelines.   04/09/2021 6.2 (H) 0 - 5.6 % Final     Comment:     Normal <5.7% Prediabetes 5.7-6.4%  Diabetes 6.5% or higher - adopted from ADA   consensus guidelines.           Non-palpable DP and PT pulses L.   Equinus noted BL. Pes planus with rigid toe deformities noted to lesser digits on the right. Left AKA noted.   Nails are thickened, discolored, elongated, with subungual debris consistent with onychomycosis.          Assessment: DM2 with left AKA and neuropathy. She has severe vasculopathy.  Onychomycosis.   Tyloma     Plan:   - Pt seen and evaluated  - Nails debrided x 5.  - Cont compression socks.  - See again in 3 months.      Eleazar Pack, FRAN

## 2023-12-26 NOTE — PROGRESS NOTES
Chief Complaint   Patient presents with    Follow Up     Nails.              Allergies   Allergen Reactions    Ampicillin-Sulbactam Sodium Rash     No evidence SJS, but very uncomfortable and precipitated multiple provider visits. Would not use penicillins again if other options available.     Penicillins Rash         Subjective: Supriya is a 73 year old female who presents to the clinic today for a diabetic foot exam and management.  Her nails do get long.  she has a history of amputation and from previous notes, we have documented that she has nonpalpable DP and PT pulses.     Objective    Hemoglobin A1C   Date Value Ref Range Status   04/21/2023 6.8 (H) 0.0 - 5.6 % Final     Comment:     Normal <5.7%   Prediabetes 5.7-6.4%    Diabetes 6.5% or higher     Note: Adopted from ADA consensus guidelines.   04/09/2021 6.2 (H) 0 - 5.6 % Final     Comment:     Normal <5.7% Prediabetes 5.7-6.4%  Diabetes 6.5% or higher - adopted from ADA   consensus guidelines.           Non-palpable DP and PT pulses L.   Equinus noted BL. Pes planus with rigid toe deformities noted to lesser digits on the right. Left AKA noted.   Nails are thickened, discolored, elongated, with subungual debris consistent with onychomycosis.          Assessment: DM2 with left AKA and neuropathy. She has severe vasculopathy.  Onychomycosis.   Tyloma     Plan:   - Pt seen and evaluated  - Nails debrided x 5.  - Cont compression socks.  - See again in 3 months.

## 2024-01-05 ENCOUNTER — OFFICE VISIT (OUTPATIENT)
Dept: DERMATOLOGY | Facility: CLINIC | Age: 74
End: 2024-01-05
Payer: MEDICARE

## 2024-01-05 DIAGNOSIS — L30.4 INTERTRIGO: ICD-10-CM

## 2024-01-05 DIAGNOSIS — L85.9 HYPERKERATOSIS: ICD-10-CM

## 2024-01-05 DIAGNOSIS — L40.8 INVERSE PSORIASIS: Primary | ICD-10-CM

## 2024-01-05 PROCEDURE — 99214 OFFICE O/P EST MOD 30 MIN: CPT | Performed by: DERMATOLOGY

## 2024-01-05 ASSESSMENT — PAIN SCALES - GENERAL: PAINLEVEL: NO PAIN (0)
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
## 2024-01-05 NOTE — NURSING NOTE
Dermatology Rooming Note    Supriya Herr's goals for this visit include:   Chief Complaint   Patient presents with    Psoriasis     Here today for a psoriasis  follow up. Has improved since last appointment.      Caroline Bertrand RN

## 2024-01-10 ENCOUNTER — TELEPHONE (OUTPATIENT)
Dept: DERMATOLOGY | Facility: CLINIC | Age: 74
End: 2024-01-10

## 2024-01-10 NOTE — TELEPHONE ENCOUNTER
MT referral from: [facility] visit (referral by provider)    MTM referral outreach attempt #2 on January 10, 2024 at 10:57 AM      Outcome: Patient not reachable after several attempts, will route to [facility] Pharmacist/Provider as an FYI.  [facility] scheduling number is . We did call and leave a voicemail message with patient's dtr Veronica. Thank you for the referral.    Use hbc for the carrier/Plan on the flowsheet      Spottedt Message Sent    Jessi Means CPhT  [facility]

## 2024-01-17 ENCOUNTER — HOSPITAL ENCOUNTER (OUTPATIENT)
Dept: WOUND CARE | Facility: CLINIC | Age: 74
Discharge: HOME | End: 2024-01-17
Attending: FAMILY MEDICINE | Admitting: FAMILY MEDICINE
Payer: MEDICARE

## 2024-01-17 VITALS — DIASTOLIC BLOOD PRESSURE: 63 MMHG | TEMPERATURE: 96.8 F | SYSTOLIC BLOOD PRESSURE: 137 MMHG | HEART RATE: 62 BPM

## 2024-01-17 DIAGNOSIS — L89.91: Primary | ICD-10-CM

## 2024-01-17 DIAGNOSIS — L89.321 PRESSURE INJURY OF LEFT BUTTOCK, STAGE 1: ICD-10-CM

## 2024-01-17 DIAGNOSIS — L89.322 PRESSURE INJURY OF LEFT BUTTOCK, STAGE 2 (H): ICD-10-CM

## 2024-01-17 PROCEDURE — G0463 HOSPITAL OUTPT CLINIC VISIT: HCPCS

## 2024-01-17 PROCEDURE — 99215 OFFICE O/P EST HI 40 MIN: CPT | Performed by: FAMILY MEDICINE

## 2024-01-17 NOTE — DISCHARGE INSTRUCTIONS
Supriya Herr      1950    An order for supplies was not placed as supplies are not needed    Dressing changes outside of clinic are being performed by Family: Daughter  Daughter KIRILL Gray #[phone]     Plan:  An order for a group 2 mattress was placed to [facility].   If there are any issues with your order including not receiving the order please call [facility] at [phone].   - Please pump up the chair cushion. If any questions, please contact your cushion person     Wound Dressing Change: Left Gluteal  - You may apply a bandage over the pressure injury if desired. We are not able to order dressings since the area is not open and draining at this time. Any supplies you would like to use here can be purchased out of pocket.    Repositioning:  Bed: Reposition MINIMALLY every 1-2 hours in bed to relieve pressure and promote perfusion to tissue.  Chair: When up to the chair, do not sit for longer than one hour total before returning to bed for at least 60 minutes to relieve pressure and promote perfusion to the tissue.  Completely recline/tilt for 15 minutes each hour.  Sit on a chair cushion when up to the chair.      Protein:  A diet high in protein is important for wound healing, we recommend getting 90 grams of protein per day.   Taking protein shakes or bars are a good way to get extra protein in your diet.   Good sources of protein:  Pork 26g per 3 oz  Whey protein powder - 24g per scoop (on average)  Greek yogurt - 23g per 8oz   Chicken or Turkey - 23g per 3oz  Fish - 20-25g per 3oz  Beef - 18-23g per 3oz  Navy beans - 20g per cup  Cottage cheese - 14g per 1/2 cup   Lentils - 13g per 1/4 cup  Beef jerky 13g per 1oz  2% milk - 8g per cup  Peanut butter - 8g per 2 tablespoons  Eggs - 6g per egg  Mixed nuts - 6g per 2oz     Group 2  Patient will need a Group 2 mattress due to stage I pressure ulceration on the patient's pelvis that has failed to improve on a normal mattress despite regular wound  cares and repositioning. A group 1 mattress is not adequate to offload these ulcerations. Patient has impaired sensation and is unable to reposition independently.      Main Provider: Milton Daugherty M.D. January 17, 2024    Call us at [phone] if you have any questions about your wounds, have redness or swelling around your wound, have a fever of 101 degrees Fahrenheit or greater or if you have any other problems or concerns. We answer the phone Monday through Friday 8 am to 4 pm, please leave a message as we check the voicemail frequently throughout the day.     If you had a positive experience please indicate that on your patient satisfaction survey form that [facility] will be sending you.    It was a pleasure meeting with you today.  Thank you for allowing me and my team the privilege of caring for you today.  YOU are the reason we are here, and I truly hope we provided you with the excellent service you deserve.  Please let us know if there is anything else we can do for you so that we can be sure you are leaving completely satisfied with your care experience.      If you have any billing related questions please call the [facility] Business office at [phone]. The clinic staff does not handle billing related matters.    If you are scheduled to have a follow up appointment, you will receive a reminder call the day before your visit. On the appointment day please arrive 15 minutes prior to your appointment time. If you are unable to keep that appointment, please call the clinic to cancel or reschedule. If you are more than 10 minutes late or greater for your scheduled appointment time, the clinic policy is that you may be asked to reschedule.
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
## 2024-01-23 ENCOUNTER — TELEPHONE (OUTPATIENT)
Dept: DERMATOLOGY | Facility: CLINIC | Age: 74
End: 2024-01-23
Payer: MEDICARE

## 2024-01-23 NOTE — TELEPHONE ENCOUNTER
Patient Contacted pt daughter stated they will call back to reschedule the following:    Appointment type: Return  Provider: Dr. Herrera  Return date: 7/5/24  Specialty phone number: [phone]

## 2024-01-30 DIAGNOSIS — N18.6 ESRD (END STAGE RENAL DISEASE) ON DIALYSIS (H): ICD-10-CM

## 2024-01-30 DIAGNOSIS — D63.1 ANEMIA DUE TO STAGE 5 CHRONIC KIDNEY DISEASE, NOT ON CHRONIC DIALYSIS (H): ICD-10-CM

## 2024-01-30 DIAGNOSIS — N18.5 ANEMIA DUE TO STAGE 5 CHRONIC KIDNEY DISEASE, NOT ON CHRONIC DIALYSIS (H): ICD-10-CM

## 2024-01-30 DIAGNOSIS — N18.5 CKD (CHRONIC KIDNEY DISEASE) STAGE 5, GFR LESS THAN 15 ML/MIN (H): ICD-10-CM

## 2024-01-30 DIAGNOSIS — Z99.2 ESRD (END STAGE RENAL DISEASE) ON DIALYSIS (H): ICD-10-CM

## 2024-01-30 RX ORDER — FUROSEMIDE 40 MG
40 TABLET ORAL DAILY
Qty: 180 TABLET | Refills: 3 | Status: SHIPPED | OUTPATIENT
Start: 2024-01-30

## 2024-01-30 RX ORDER — FUROSEMIDE 80 MG
80 TABLET ORAL 2 TIMES DAILY
Qty: 180 TABLET | Refills: 3 | Status: SHIPPED | OUTPATIENT
Start: 2024-01-30

## 2024-02-05 ENCOUNTER — OFFICE VISIT (OUTPATIENT)
Dept: TRANSPLANT | Facility: CLINIC | Age: 74
End: 2024-02-05
Attending: SURGERY
Payer: MEDICARE

## 2024-02-05 ENCOUNTER — LAB (OUTPATIENT)
Dept: LAB | Facility: CLINIC | Age: 74
End: 2024-02-05
Attending: SURGERY
Payer: MEDICARE

## 2024-02-05 VITALS
RESPIRATION RATE: 18 BRPM | WEIGHT: 211.2 LBS | DIASTOLIC BLOOD PRESSURE: 52 MMHG | HEART RATE: 57 BPM | SYSTOLIC BLOOD PRESSURE: 125 MMHG | TEMPERATURE: 97.7 F | BODY MASS INDEX: 33.15 KG/M2 | HEIGHT: 67 IN | OXYGEN SATURATION: 98 %

## 2024-02-05 DIAGNOSIS — N18.6 ESRD (END STAGE RENAL DISEASE) (H): ICD-10-CM

## 2024-02-05 DIAGNOSIS — Z76.82 ORGAN TRANSPLANT CANDIDATE: ICD-10-CM

## 2024-02-05 DIAGNOSIS — Z79.4 TYPE 2 DIABETES MELLITUS WITH DIABETIC NEUROPATHY, WITH LONG-TERM CURRENT USE OF INSULIN (H): ICD-10-CM

## 2024-02-05 DIAGNOSIS — E11.40 TYPE 2 DIABETES MELLITUS WITH DIABETIC NEUROPATHY, WITH LONG-TERM CURRENT USE OF INSULIN (H): ICD-10-CM

## 2024-02-05 DIAGNOSIS — F43.23 ADJUSTMENT DISORDER WITH MIXED ANXIETY AND DEPRESSED MOOD: ICD-10-CM

## 2024-02-05 DIAGNOSIS — E55.9 VITAMIN D DEFICIENCY: ICD-10-CM

## 2024-02-05 LAB
ALBUMIN SERPL BCG-MCNC: 4 G/DL (ref 3.5–5.2)
ALP SERPL-CCNC: 69 U/L (ref 40–150)
ALT SERPL W P-5'-P-CCNC: 8 U/L (ref 0–50)
ANION GAP SERPL CALCULATED.3IONS-SCNC: 12 MMOL/L (ref 7–15)
AST SERPL W P-5'-P-CCNC: 12 U/L (ref 0–45)
BILIRUB SERPL-MCNC: 0.2 MG/DL
BUN SERPL-MCNC: 62.7 MG/DL (ref 8–23)
CALCIUM SERPL-MCNC: 10.1 MG/DL (ref 8.8–10.2)
CHLORIDE SERPL-SCNC: 101 MMOL/L (ref 98–107)
CREAT SERPL-MCNC: 6.74 MG/DL (ref 0.51–0.95)
DEPRECATED HCO3 PLAS-SCNC: 27 MMOL/L (ref 22–29)
EGFRCR SERPLBLD CKD-EPI 2021: 6 ML/MIN/1.73M2
ERYTHROCYTE [DISTWIDTH] IN BLOOD BY AUTOMATED COUNT: 13.7 % (ref 10–15)
GLUCOSE SERPL-MCNC: 199 MG/DL (ref 70–99)
HCT VFR BLD AUTO: 33.2 % (ref 35–47)
HGB BLD-MCNC: 10.8 G/DL (ref 11.7–15.7)
MCH RBC QN AUTO: 32.5 PG (ref 26.5–33)
MCHC RBC AUTO-ENTMCNC: 32.5 G/DL (ref 31.5–36.5)
MCV RBC AUTO: 100 FL (ref 78–100)
PLATELET # BLD AUTO: 153 10E3/UL (ref 150–450)
POTASSIUM SERPL-SCNC: 4.6 MMOL/L (ref 3.4–5.3)
PROT SERPL-MCNC: 7.3 G/DL (ref 6.4–8.3)
RBC # BLD AUTO: 3.32 10E6/UL (ref 3.8–5.2)
SODIUM SERPL-SCNC: 140 MMOL/L (ref 135–145)
VIT D+METAB SERPL-MCNC: 39 NG/ML (ref 20–50)
WBC # BLD AUTO: 5.8 10E3/UL (ref 4–11)

## 2024-02-05 PROCEDURE — 99214 OFFICE O/P EST MOD 30 MIN: CPT | Performed by: SURGERY

## 2024-02-05 PROCEDURE — 86832 HLA CLASS I HIGH DEFIN QUAL: CPT | Performed by: SURGERY

## 2024-02-05 PROCEDURE — 82306 VITAMIN D 25 HYDROXY: CPT | Performed by: FAMILY MEDICINE

## 2024-02-05 PROCEDURE — G0463 HOSPITAL OUTPT CLINIC VISIT: HCPCS | Performed by: SURGERY

## 2024-02-05 PROCEDURE — 86833 HLA CLASS II HIGH DEFIN QUAL: CPT | Performed by: SURGERY

## 2024-02-05 PROCEDURE — 99000 SPECIMEN HANDLING OFFICE-LAB: CPT | Performed by: PATHOLOGY

## 2024-02-05 PROCEDURE — 80053 COMPREHEN METABOLIC PANEL: CPT | Performed by: PATHOLOGY

## 2024-02-05 PROCEDURE — 85027 COMPLETE CBC AUTOMATED: CPT | Performed by: PATHOLOGY

## 2024-02-05 PROCEDURE — 36415 COLL VENOUS BLD VENIPUNCTURE: CPT | Performed by: PATHOLOGY

## 2024-02-05 ASSESSMENT — PAIN SCALES - GENERAL: PAINLEVEL: NO PAIN (0)

## 2024-02-05 NOTE — NURSING NOTE
Frailty Assessment Note: Frail    Overall Score 3/5    Weight:   Wt Readings from Last 3 Encounters:   02/05/24 95.8 kg (211 lb 3.2 oz)   04/21/23 98 kg (216 lb)   01/31/23 94.3 kg (208 lb)     Height: 170.2 cm    Weight lost over 10lbs in last year: No    Reported Exhaustion/Energy Levels: Moderate    Slowness: N/A seconds / 15 feet walking (average of 2 attempts)    Low Activity Level Score: 1      Strength: 21 (average of 3 trials on dominant hand)      Jaswant Morse RN on 2/5/2024 at 2:03 PM

## 2024-02-05 NOTE — NURSING NOTE
"Chief Complaint   Patient presents with    RECHECK     K/P wait list      Vital signs:  Temp: 97.7  F (36.5  C) Temp src: Oral BP: 125/52 Pulse: 57   Resp: 18 SpO2: 98 %       Weight:  (unable to stand)  Estimated body mass index is 34.86 kg/m  as calculated from the following:    Height as of 4/21/23: 1.676 m (5' 6\").    Weight as of 4/21/23: 98 kg (216 lb).      Jennifer Hollis, [facility]  2/5/2024 1:16 PM    "

## 2024-02-05 NOTE — Clinical Note
2/5/2024         RE: Supriya Herr  [address] [place] [address]        Dear Colleague,    Thank you for referring your patient, Supriya Herr, to the [facility]. Please see a copy of my visit note below.    No notes on file    Again, thank you for allowing me to participate in the care of your patient.        Sincerely,        Kennedy Banks MD

## 2024-02-12 DIAGNOSIS — E78.00 HYPERCHOLESTEROLEMIA: ICD-10-CM

## 2024-02-13 ENCOUNTER — MYC MEDICAL ADVICE (OUTPATIENT)
Dept: FAMILY MEDICINE | Facility: CLINIC | Age: 74
End: 2024-02-13
Payer: MEDICARE

## 2024-02-13 DIAGNOSIS — N18.5 CKD (CHRONIC KIDNEY DISEASE) STAGE 5, GFR LESS THAN 15 ML/MIN (H): ICD-10-CM

## 2024-02-13 RX ORDER — SEVELAMER CARBONATE 800 MG/1
TABLET, FILM COATED ORAL
Qty: 270 TABLET | Refills: 3 | Status: CANCELLED | OUTPATIENT
Start: 2024-02-13

## 2024-02-13 RX ORDER — SEVELAMER CARBONATE 800 MG/1
TABLET, FILM COATED ORAL
Qty: 630 TABLET | Refills: 0 | OUTPATIENT
Start: 2024-02-13

## 2024-02-13 RX ORDER — ATORVASTATIN CALCIUM 20 MG/1
20 TABLET, FILM COATED ORAL EVERY EVENING
Qty: 90 TABLET | Refills: 0 | Status: SHIPPED | OUTPATIENT
Start: 2024-02-13 | End: 2024-05-20

## 2024-02-14 LAB
PROTOCOL CUTOFF: NORMAL
SA 1 CELL: NORMAL
SA 1 TEST METHOD: NORMAL
SA 2 CELL: NORMAL
SA 2 TEST METHOD: NORMAL
SA1 HI RISK ABY: NORMAL
SA1 MOD RISK ABY: NORMAL
SA2 HI RISK ABY: NORMAL
SA2 MOD RISK ABY: NORMAL
UNACCEPTABLE ANTIGENS: NORMAL
UNOS CPRA: 57
ZZZSA 1  COMMENTS: NORMAL
ZZZSA 2 COMMENTS: NORMAL

## 2024-02-15 RX ORDER — SEVELAMER CARBONATE 800 MG/1
TABLET, FILM COATED ORAL
Qty: 300 TABLET | Refills: 11 | Status: SHIPPED | OUTPATIENT
Start: 2024-02-15

## 2024-02-20 DIAGNOSIS — E11.3213 TYPE 2 DIABETES MELLITUS WITH MILD NONPROLIFERATIVE RETINOPATHY OF BOTH EYES AND MACULAR EDEMA, UNSPECIFIED WHETHER LONG TERM INSULIN USE (H): Primary | ICD-10-CM

## 2024-02-26 ENCOUNTER — MYC REFILL (OUTPATIENT)
Dept: FAMILY MEDICINE | Facility: CLINIC | Age: 74
End: 2024-02-26
Payer: MEDICARE

## 2024-02-26 ENCOUNTER — VIRTUAL VISIT (OUTPATIENT)
Dept: RHEUMATOLOGY | Facility: CLINIC | Age: 74
End: 2024-02-26
Attending: DERMATOLOGY
Payer: MEDICARE

## 2024-02-26 DIAGNOSIS — L40.9 PSORIASIS: Primary | ICD-10-CM

## 2024-02-26 DIAGNOSIS — E78.5 HYPERLIPIDEMIA LDL GOAL <100: ICD-10-CM

## 2024-02-26 DIAGNOSIS — E78.00 HYPERCHOLESTEROLEMIA: ICD-10-CM

## 2024-02-26 DIAGNOSIS — Z78.9 TAKES DIETARY SUPPLEMENTS: ICD-10-CM

## 2024-02-26 DIAGNOSIS — R52 PAIN: ICD-10-CM

## 2024-02-26 DIAGNOSIS — I10 HYPERTENSION GOAL BP (BLOOD PRESSURE) < 140/90: ICD-10-CM

## 2024-02-26 DIAGNOSIS — Z99.2 ESRD (END STAGE RENAL DISEASE) ON DIALYSIS (H): ICD-10-CM

## 2024-02-26 DIAGNOSIS — E11.40 TYPE 2 DIABETES MELLITUS WITH DIABETIC NEUROPATHY, WITH LONG-TERM CURRENT USE OF INSULIN (H): ICD-10-CM

## 2024-02-26 DIAGNOSIS — N18.6 ESRD (END STAGE RENAL DISEASE) ON DIALYSIS (H): ICD-10-CM

## 2024-02-26 DIAGNOSIS — R06.02 SHORTNESS OF BREATH: ICD-10-CM

## 2024-02-26 DIAGNOSIS — M79.641 PAIN OF RIGHT HAND: ICD-10-CM

## 2024-02-26 DIAGNOSIS — Z79.4 TYPE 2 DIABETES MELLITUS WITH DIABETIC NEUROPATHY, WITH LONG-TERM CURRENT USE OF INSULIN (H): ICD-10-CM

## 2024-02-26 DIAGNOSIS — F33.9 MAJOR DEPRESSIVE DISORDER, RECURRENT (H): ICD-10-CM

## 2024-02-26 RX ORDER — GABAPENTIN 100 MG/1
100 CAPSULE ORAL 3 TIMES DAILY PRN
Qty: 90 CAPSULE | Refills: 11 | Status: SHIPPED | OUTPATIENT
Start: 2024-02-26

## 2024-02-26 RX ORDER — ATORVASTATIN CALCIUM 20 MG/1
20 TABLET, FILM COATED ORAL EVERY EVENING
Qty: 90 TABLET | Refills: 0 | Status: CANCELLED | OUTPATIENT
Start: 2024-02-26

## 2024-02-26 NOTE — PROGRESS NOTES
Medication Therapy Management (MTM) Encounter    ASSESSMENT:                            Medication Adherence/Access: No issues identified.    Psoriasis (inverse and guttate): Reviewed education on Otezla. Patient would benefit from having access to a new starter pack to assure access if needed if psoriasis flares in the future and wishes to restart. Would benefit from continuing to use topicals as needed for flares in the meantime.     Type 2 Diabetes: Would benefit from continuing to work with specialist managing this condition. Follow up with endocrinologist as planned.     Hypertension/Hyperlipidemia/CHF: Would benefit from continuing to work with specialist managing this condition.      Depression/SAD: Patient may benefit from using an OTC xylitol to see if helpful for dry mouth. Also would benefit from following up with kidney transplant surgeon (Kennedy Banks) to assure the therapy referral is sent forward.     ESRD, Hyperphosphetemia: Would benefit from continuing to work with specialist managing this condition.     Pain: Would benefit from continuing to work with specialist managing this condition; they will manage refills for gabapentin as well.     Shortness of breath: Stable, controlled.     Supplements: Stable, controlled.     PLAN:                            Send order for Otezla starter pack to use if needed to [facility] (#[phone]).   There are refills available for you of atorvastatin at [facility] as well.   Consider receiving the following vaccinations: shingles (Shingrix; 2 dose-series) and RSV.   Remember to request gabapentin refill through your pharmacy.  I followed up with Dr. Kennedy Banks regarding the therapy referral.   I didn't get this done in time to print this last Tuesday, but will print and mail the updated med list when in clinic this next Tuesday.   Remember to stay in contact with your endocrinology team to help manage blood sugars.    I ran the updated medication list through a drug interaction . Of note, carvedilol can mask some of the effects of low blood sugars (all except sweatting), so continuing to use the CGM will be important. Other drug interactions are not anything I am clinically concerned about (in other words, some work through similar pathways / can affect each other, but we would just adjust doses if side effects arrise, which none came up that concerned us in those avenues).   Try OTC xylitol drops to help with dry mouth.     Follow-up: as needed    SUBJECTIVE/OBJECTIVE:                          Supriya Herr is a 74 year old female called for an initial visit. She was referred to me from Dr. Lynnette Herrera MD. Patient was accompanied by Caroline, her daughter (whom prefers to take patient's calls).     Reason for visit: Otezla discussion.    Allergies/ADRs: Reviewed in chart and with patient.  Past Medical History: Reviewed in chart.  Tobacco: She reports that she quit smoking about 6 years ago. Her smoking use included cigarettes. She started smoking about 60 years ago. She has a 26 pack-year smoking history. She has never used smokeless tobacco.  Alcohol: None.    Medication Adherence/Access: no issues reported.    Psoriasis (inverse and guttate):   - Otezla - holding for now.  - Tacrolimus 0.1% ointment twice daily to skin folds. Not using right now.  - Triamcinolone 0.025% ointment twice daily to rash under breasts and groin as needed.  - Miconazole 2% twice daily under breasts and belly flap.  - Not currently using desonide 0.05% ointment.     Currently has symptoms that they are watching for changes on elbow and breasts. They have a 60 day supply at home of taking Otezla 30 mg once daily, but no starter pack available. They report as long as they consistently apply the topicals, her symptoms are controlled. We discussed it is okay to use triamcinolone as elbow as needed. They would like a starter pack of Otezla  (renal dosing) to have at home to start Otezla if needed going forward, and are agreeable to reaching out ot me if they do restart so we can coordinate continued coverage of refills.     Specialist: Dr. Lynnette Herrera MD, Dermatology. Last visit on 1/5/24. The following was recommended:   # Psoriasis, inverse and guttate - chronic, active.   # Intertrigo - chronic, active, improved.  - doing better after starting miconazole powder  - they are wondering about stopping otezla as not sure how much it's doing. Advised ok to finish current Otezla 30 mg daily, then discontinue. If necessary to restart, can resend start back. MTM referral placed.  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  - Advised urea or amlactin     Immunization History   Covid-19 vaccine (1236-8687 version)  Complete   Influenza (annual, 2593-5336) Complete, avoid live FluMist   Tetanus/Tdap Up-to-date   Pneumococcal  Prevnar-13: 8/13/15  Pneumovax-23: 8/27/12, 4/8/19   Prevnar-20: none  Complete   Shingrix Due for Shingrix (did receive Zostavax)     Hepatitis B Complete   RSV (only for ? 60 years old)  Due to receive   All patients on biologics should avoid live vaccines (varicella/VZV, intranasal influenza, MMR, or yellow fever vaccine (if traveling)).     Type 2 Diabetes:    - Novolog insulin 5 units three times daily with meals.  - Basaglar insulin 18 units once daily.  - 32 x 4 mm are her preferred needles.   - Glucagon spray as needed for low blood sugar.     Patient reports no current medication side effects. She uses a CGM to monitor blood sugars and has had both lows (69-84) and highs (196-254) that she notes lately. States she really likes snacking. Some days on dialysis days she will stay in bed until noon without eating. She will not use novolog if < 100. Sees endo specialist, Arabella Kamara on 5/15. Eye exam: up to date. Foot exam: up to  date.    Urine Albumin:   Lab Results   Component Value Date    UMALCR 6,037.74 (H) 01/09/2018      Lab Results   Component Value Date    A1C 6.8 (H) 04/21/2023     Hypertension/Hyperlipidemia/CHF:   - Amlodipine 5 mg twice daily.  - Atorvastatin 20 mg once daily. [requested refill through [facility]]   - Carvedilol 25 mg twice daily.  - Furosemide 40 + 80 mg tablets: take 120 mg twice daily on dialysis days and 80 mg twice daily on non-dialysis days.    Patient reports no current medication side effects (muscle pain, dizziness, urinary frequency). Patient does not self-monitor blood pressure.      BP Readings from Last 3 Encounters:   02/05/24 125/52   01/17/24 137/63   04/21/23 108/64     Pulse Readings from Last 3 Encounters:   02/05/24 57   01/17/24 62   04/21/23 58     Recent Labs   Lab Test 04/21/23  1405 01/17/20  1204   CHOL 113 145   HDL 50 55   LDL 39 74   TRIG 122 78      Depression/SAD:  - Sertraline 75 mg (25 mg tab + 50 mg tab) once daily.     Patient does have dry mouth, although tolerable, usually in the morning. Patient is not seeing a therapist or a psychiatrist. Does see a kidney transplant surgeon (Kennedy Banks) that was supposed to refer her to see a counselor, but she has not heard back from this yet.         9/19/2022     3:12 PM 4/21/2023    12:44 PM 11/1/2023     3:26 PM   PHQ-9 SCORE   PHQ-9 Total Score MyChart 2 (Minimal depression) 3 (Minimal depression)    PHQ-9 Total Score 2 3 10         8/29/2011     6:11 PM   NICOLE-7 SCORE   Total Score 3     ESRD, Hyperphosphetemia:    - Dialysis  - Epoetin candy at dialysis three times weekly.  - Cinacalcet 30 mg once daily.     - Sevelamer 800 mg tablets: Take 2 with meals and 1 with snacks daily.       Patient reports no current medication side effects. Does experience nausea and dizziness with dialysis. Patient reports not currently getting iron IV.     Pain:    - Acetaminophen 325 mg tablet x 2 every 4 hours as needed.    -  "Gabapentin 100 mg three times daily as needed for neuropathic pain.   - Voltaren topical gel as needed (not currently using).    Patient reports no current medication side effects. Patient is low on her gabapentin and will request a refill from her neurologist; usually takes one in the morning each morning.     Shortness of breath:    - Albuterol inhaler 2 puffs every 6 hours as needed for shortness of breath.       Patient only needs when moving too much, which she says is \"not often at all\".    Supplements:   - Vitamin D3 50 mcg once daily.   - Probioitc once daily as needed.    No reported issues at this time.      Today's Vitals: There were no vitals taken for this visit.  ----------------  I spent 60 minutes with this patient today. All changes were made via collaborative practice agreement with Dr. Lynnette Herrera MD. A copy of the visit note was provided to the patient's provider(s).    A summary of these recommendations was sent via [employer].    Carla Harrison, Pharm.D.  Medication Therapy Management Pharmacist   [facility]    Telemedicine Visit Details  Type of service:  Telephone visit  Start Time: 1:30 PM  End Time: 2:30 PM     Medication Therapy Recommendations  No medication therapy recommendations to display    "

## 2024-02-26 NOTE — Clinical Note
FYI -- order has been sent forward for Supriya (Otezla renal dose starter pack) to have on hand if needed for restart. Thanks for the referral!

## 2024-02-26 NOTE — Clinical Note
2/26/2024       RE: Supriya Herr  [address]  [facility] [address]     Dear Colleague,    Thank you for referring your patient, Supriya Herr, to the [facility]HEUMATOLOG[facility]IN[facility] [place] [facility]. Please see a copy of my visit note below.    Medication Therapy Management (MTM) Encounter    ASSESSMENT:                            Medication Adherence/Access: {adherencechoices:014057}.    ***: Provided education on *** today including dosing, general administration, side effects (both common/serious), precautions, monitoring and time to efficacy. Encouraged indicated non-live vaccines and avoidance of live vaccines. Discussed potential need to hold therapy in the setting of signs/symptoms of active infection. Encouraged patient to contact the Dermatology clinic in the event they have questions. Would benefit from starting *** once it arrives.        PLAN:                            Order sent for *** to [facility] (#[phone]). They will contact patient pending insurance coverage.   Consider receiving the following vaccinations: {Vaccines:642865}.   Drug interaction : ***.     Send order for Otezla starter pack to use if needed ***  Refill atorvastatin rx  Patient to request gabaepntin refill through pharmacy  PharmD to run drug interaction   PharmD to inform endocinologist of blood sugars  PharmD to inform surgen that referral didn't go through   PharmD to update and print/send med list for patient     Follow-up: ***    SUBJECTIVE/OBJECTIVE:                          Supriya Herr is a 74 year old female called for an initial visit. She was referred to me from Dr. Lynnette Herrera MD. {[id number]}. Caroline daughter.     Reason for visit: ***.    Allergies/ADRs: Reviewed in chart and with patient.  Past Medical History: Reviewed in chart.  Tobacco: She reports that she quit smoking about 6 years ago.  Her smoking use included cigarettes. She started smoking about 60 years ago. She has a 26 pack-year smoking history. She has never used smokeless tobacco.  Alcohol: None.    Medication Adherence/Access: no issues reported.    Psoriasis (inverse and guttate):   - Otezla - holding for now.  - Tacrolimus 0.1% ointment twice daily to skin folds. Not using.   - Triamcinolone 0.025% ointment twice daily to rash under breasts and groin as needed.  - Miconazole 2% twice daily under breasts and belly flap.     Watching elbow and breasts. One month 1 once daily 30 mg tablets so 2 months supply. Doesn't have loading dose. As long as she uses the topicals daily then her sx are controlled. If gets behind.     Can Triamolone on elbow. Arms in general.     Specialist: Dr. Lynnette Herrera MD, Dermatology. Last visit on 1/5/24. The following was recommended:   # Psoriasis, inverse and guttate - chronic, active.   # Intertrigo - chronic, active, improved.  - doing better after starting miconazole powder  - they are wondering about stopping otezla as not sure how much it's doing. Advised ok to finish current Otezla 30 mg daily, then discontinue. If necessary to restart, can resend start back. MTM referral placed.  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  - Advised urea or amlactin     Previous treatment:   ***    Immunization History   Covid-19 vaccine (3441-6969 version)  Complete   Influenza (annual, 9118-8566) Complete, avoid live FluMist   Tetanus/Tdap Up-to-date   Pneumococcal  Prevnar-13: 8/13/15  Pneumovax-23: 8/27/12, 4/8/19   Prevnar-20: none  Complete   Shingrix Due for Shingrix (did receive Zostavax)     Hepatitis B Complete   RSV (only for ? 60 years old)  Due to receive   All patients on biologics should avoid live vaccines (varicella/VZV, intranasal influenza, MMR, or yellow fever vaccine (if traveling)).     Type 2 Diabetes:     - Novolog insulin 5 units three times daily with meals.  - Basaglar insulin 18 units once daily.    Patient reports no current medication side effects.  Blood sugar monitoring: Continuous Glucose Monitor 74, 84, 69. Arabella Kamara 5/15. In morning on non dialysis doses, in bed until noon. No food. No novlolog if < 100. Some are higher. 203, 254, 196, 242. Desserts. Snack finattic. 207.   Eye exam: up to date. Foot exam: up to date.    Urine Albumin:   Lab Results   Component Value Date    UMALCR 6,037.74 (H) 01/09/2018      Lab Results   Component Value Date    A1C 6.8 (H) 04/21/2023     Hypertension/Hyperlipidemia/CHF:   - Amlodipine 5 mg twice daily.  - Atorvastatin 20 mg once daily. [requested refill through [facility]]   - Carvedilol 25 mg twice daily.  - Furosemide 40 + 80 mg tablets: take 120 mg twice daily on dialysis days and 80 mg twice daily on non-dialysis days.    Patient reports no current medication side effects (muscle pain, dizziness (once in a while only in dialysis), urinary frequency). Patient does not self-monitor blood pressure.      BP Readings from Last 3 Encounters:   02/05/24 125/52   01/17/24 137/63   04/21/23 108/64     Pulse Readings from Last 3 Encounters:   02/05/24 57   01/17/24 62   04/21/23 58     Recent Labs   Lab Test 04/21/23  1405 01/17/20  1204   CHOL 113 145   HDL 50 55   LDL 39 74   TRIG 122 78      Depression/SAD:  - Sertraline 75 mg (25 mg tab + 50 mg tab) once daily.   -  Try xylitol.     Patient reports no current medication side effects. Does have dry mouth, tolerable, usually in the morning. Patient is not seeing a therapist. Patient is not seeing a psychiatrist. Does see a kidney transplant surgeon that was supposed to refer her to see a counselor. Kennedy Banks        9/19/2022     3:12 PM 4/21/2023    12:44 PM 11/1/2023     3:26 PM   PHQ-9 SCORE   PHQ-9 Total Score MyChart 2 (Minimal depression) 3 (Minimal depression)    PHQ-9 Total Score 2 3 10          8/29/2011     6:11 PM   NICOLE-7 SCORE   Total Score 3     ESRD, Hyperphosphetemia:    - Sevelamer 800 mg tablets: Take 2 with meals and 1 with snacks daily.       Patient reports no current medication side effects. Current symptoms include: n/v. Patient reports symptoms are { :724213}.     Pain:    - Acetaminophen 325 mg tablet x 2 every 4 hours as needed.    - Gabapentin 100 mg three times daily as needed for neuropathic pain. Usually on in the morning. Low on this one. Nephrologist - 4.     Patient reports no current medication side effects. Current symptoms include: ***. Patient reports symptoms are { :687210}.     Supplements:   - Vitamin D3 50 mcg once daily.     - Probioitc as needed   - Colchoral on occasion    {supplement:768510}       - Ciclopirox 0.77% cream - no  - Desonide 0.05% ointment - no  - Voltaren gel - no but can use as needed.  - Iron IV - no     - Albuterol inhaler as needed -- not often at all. Shortness of breath. When moving too much.   - Glucagon as needed.     - Epoetin candy - dialysis  - Cinacalcet 30 mg once daily.    - send refill otezla.  - print list and mail.  - 32 x 4 mm     Today's Vitals: There were no vitals taken for this visit.  ----------------  {DAVI?:109421}    I spent 60 minutes with this patient today. All changes were made via collaborative practice agreement with {Dermatology Providers:491236}. A copy of the visit note was provided to the patient's provider(s).    A summary of these recommendations was sent via [employer].    Carla Harrison, Pharm.D.  Medication Therapy Management Pharmacist   [facility] Dermat[facility]    Telemedicine Visit Details  Type of service:  Telephone visit  Start Time: 1:30 PM  End Time: 2:30 PM     Medication Therapy Recommendations  No medication therapy recommendations to display        Again, thank you for allowing me to participate in the care of your patient.      Sincerely,    Carla Harrison, [facility]

## 2024-02-27 ENCOUNTER — MYC MEDICAL ADVICE (OUTPATIENT)
Dept: FAMILY MEDICINE | Facility: CLINIC | Age: 74
End: 2024-02-27
Payer: MEDICARE

## 2024-02-27 DIAGNOSIS — E55.9 VITAMIN D DEFICIENCY: ICD-10-CM

## 2024-02-27 DIAGNOSIS — I10 HYPERTENSION GOAL BP (BLOOD PRESSURE) < 140/90: ICD-10-CM

## 2024-02-27 RX ORDER — CHOLECALCIFEROL (VITAMIN D3) 50 MCG
50 TABLET ORAL DAILY
Qty: 90 TABLET | Refills: 0 | Status: SHIPPED | OUTPATIENT
Start: 2024-02-27 | End: 2024-06-05

## 2024-02-27 RX ORDER — AMLODIPINE BESYLATE 5 MG/1
5 TABLET ORAL 2 TIMES DAILY
Qty: 180 TABLET | Refills: 0 | Status: SHIPPED | OUTPATIENT
Start: 2024-02-27 | End: 2024-08-22

## 2024-02-27 NOTE — TELEPHONE ENCOUNTER
Sent [employer] message relying rx sent on 2/23/24.    Blaise Hernandez RN   [place]

## 2024-02-27 NOTE — TELEPHONE ENCOUNTER
Per pt mychart and reviewing med list pt has one more fill of amlodipine at [facility] but none at [facility]. Pended med.    Furosemide 40 and 80mg has year supply rx already at [facility] along with rx for sertraline 50mg. But does only have one refill of the sertraline 25mg at [facility].    Finally vitamin d3 was never sent to [facility] pharmacy, pended that as well.    Also sent message to pt relaying the above.    Thanks!  Blaise Hernandez RN   [facility]    
Pharmacy change request  
no

## 2024-03-01 NOTE — PATIENT INSTRUCTIONS
Recommendations from today's MTM visit:                                                    MTM (medication therapy management) is a service provided by a clinical pharmacist designed to help you get the most of out of your medicines.      I sent the order for the Otezla starter pack to be considered by insurance. Once coverage is aligned, you will be able to request the shipment from [facility] (P:[phone]).  There are refills available for you of atorvastatin at [facility] as well. If you have not gotten notice that you can fill this, please reach out to them to request the refill.   Consider receiving the following vaccinations: shingles (Shingrix; 2 dose-series) and RSV.   Remember to request the gabapentin refill through your pharmacy.  I followed up with Dr. Kennedy Banks regarding the therapy referral. Ill keep you posted if I hear back from them, otherwise they could be reaching out directly to you. If you don't hear from either of us in the next week or so, send Dr. Banks's team a [employer] message to loop you in.   I didn't get the med list done in time to print this last Tuesday, but will print and mail the updated med list when in clinic this next Tuesday.   Remember to stay in contact with your endocrinology team to help manage blood sugars.   I ran the updated medication list through a drug interaction . Of note, carvedilol can mask some of the effects of low blood sugars (all except sweatting), so continuing to use the CGM will be important. Other drug interactions are not anything I am clinically concerned about (in other words, some work through similar pathways / can affect each other, but we would just adjust doses if side effects arrise, which none came up that concerned us in those avenues).   Try OTC xylitol drops to help with dry mouth.     Follow-up: as needed     It was great speaking with you today.  I value your experience and would be  "very thankful for your time in providing feedback in our clinic survey. In the next few days, you may receive an email or text message from [facility] with a link to a survey related to your  clinical pharmacist.\"     To schedule another MT appointment, please call the clinic directly or you may call the MTM scheduling line at [phone].    My Clinical Pharmacist's contact information:                                                      Please feel free to contact me with any questions or concerns you have.      Carla Harrison, Pharm.D.  Medication Therapy Management Pharmacist   [facility]  [facility] Scheduling Line: [phone]           Medication List            Accurate as of February 26, 2024 11:59 PM. If you have any questions, ask your nurse or doctor.                CHANGE how you take these medications          Morning Afternoon Evening Bedtime    * furosemide 80 MG tablet  Also known as: LASIX  Take 1 tablet (80 mg) by mouth 2 times daily  What changed:   how much to take  when to take this  additional instructions                     * furosemide 40 MG tablet  Also known as: LASIX  Take 1 tablet (40 mg) by mouth daily And take one tablet of 80mg to make total of 120mg twice a day  What changed:   how much to take  when to take this  additional instructions                     * Otezla 30 MG tablet  TAKE ONE TABLET BY MOUTH EVERY MORNING  Generic drug: apremilast  What changed: Another medication with the same name was added. Make sure you understand how and when to take each.  Changed by: Carla Harrison [facility]                     * Apremilast 10 & 20 & 30 MG Tbpk  Also known as: OTEZLA  Take 10 mg daily in the morning on days 1 to 3, then 20 mg daily on days 4 and 5, then start 30 mg daily on day 6.  Doctor's comments: MTM Patient.  What changed: You were already taking a medication with the same name, and this prescription was added. Make sure you understand how and when to take " each.  Changed by: Carla Harrison [facility]                          * This list has 4 medication(s) that are the same as other medications prescribed for you. Read the directions carefully, and ask your doctor or other care provider to review them with you.                CONTINUE taking these medications          Morning Afternoon Evening Bedtime    acetaminophen 325 MG tablet  Also known as: TYLENOL  Take 2 tablets (650 mg) by mouth every 4 hours as needed for mild pain                     albuterol 108 (90 Base) MCG/ACT inhaler  Also known as: PROAIR HFA/PROVENTIL HFA/VENTOLIN HFA  Inhale 2 puffs into the lungs every 6 hours as needed for shortness of breath / dyspnea or wheezing  Doctor's comments: Pharmacy may dispense brand covered by insurance (Proair, or proventil or ventolin or generic albuterol inhaler)                     amLODIPine 5 MG tablet  Also known as: NORVASC  Take 1 tablet (5 mg) by mouth 2 times daily                     atorvastatin 20 MG tablet  Also known as: LIPITOR  TAKE ONE TABLET BY MOUTH ONCE DAILY IN THE EVENING                     Baqsimi One Pack 3 MG/DOSE nasal powder  Spray 3 mg in nostril See Admin Instructions USE ONLY FOR SEVERE HYPOGLYCEMIA.  Generic drug: Glucagon                     BD Pen Needle Di 2nd Gen 32G X 4 MM miscellaneous  USE 4 DAILY WITH INSULIN INJECTIONS.  Generic drug: insulin pen needle                     blood glucose lancing device  Device to be used with lancets.needs lancets device for delica lancets                     blood glucose monitoring lancets  Use to test blood sugar 3 times daily or as directed.watever is covered                     blood glucose monitoring meter device kit  Also known as: NO BRAND SPECIFIED  Use to test blood sugar 3 times daily or as directed. Whatever is covered                     blood glucose test strip  Also known as: NO BRAND SPECIFIED  Use to test blood sugar 3 times daily or as directed.whatever is covered             "         carvedilol 25 MG tablet  Also known as: COREG  Take 1 tablet (25 mg) by mouth 2 times daily (with meals)                     cinacalcet 30 MG tablet  Also known as: SENSIPAR  Take 30 mg by mouth daily  Reason for med: Overactive Parathyroid Gland                     desonide 0.05 % external ointment  Also known as: DESOWEN  Apply topically as needed                     diclofenac 1 % topical gel  Also known as: VOLTAREN  Apply 4 g topically 4 times daily as needed for moderate pain                     EPOGEN IJ  Inject 800 Units into the vein three times a week tues thurs sat at dialysis                     FreeStyle Avery 2 Edmond Emma  Use to read blood sugars as per 's instructions.                     FreeStyle Avery 2 Sensor Misc  Change every 14 days.                     gabapentin 100 MG capsule  Also known as: NEURONTIN  Take 1 capsule (100 mg) by mouth 3 times daily as needed for neuropathic pain                     insulin glargine 100 UNIT/ML pen  INJECT 18 UNITS SUBCUTANEOUS DAILY.  Doctor's comments: If Basaglar is not covered by insurance, may substitute Lantus or Semglee or other insulin glargine product per insurance preference at same dose and frequency.    Generic drug: insulin glargine                     miconazole 2 % external powder  Also known as: MICATIN  Apply topically 2 times daily as needed (redness under breasts/groin) Apply twice daily to skin folds as needed                     NovoLOG FLEXPEN 100 UNIT/ML soln  Inject subcu 5 units with breakfast, lunch and dinner. Approx daily dose 15 units.  Generic drug: insulin aspart                     order for DME  1 wheelchair                     order for DME  Equipment being ordered: mattress overlay for hospital bed  Wt. 192#  Height 5'5\"  99 months/Lifetime                     PROBIOTIC ADVANCED PO  Take 1 capsule by mouth daily as needed                     * sertraline 50 MG tablet  Also known as: ZOLOFT  Take 1 " tablet (50 mg) by mouth at bedtime                     * sertraline 25 MG tablet  Also known as: ZOLOFT  TAKE 1  TABLET BY MOUTH EVERY DAY along with a 50 mg tablet for a total of 75 mg                     sevelamer carbonate 800 MG tablet  Also known as: Renvela  Take two with meals and one with snacks                     tacrolimus 0.1 % external ointment  Also known as: PROTOPIC  Apply topically 2 times daily To skin folds                     triamcinolone 0.025 % external ointment  Also known as: KENALOG  Apply topically 2 times daily To rash under breasts and groin as needed                     VENOFER IV  Inject 50 mg into the vein twice a week At dialysis session (tues/Sat)                     vitamin D3 50 mcg (2000 units) tablet  Also known as: CHOLECALCIFEROL  Take 1 tablet (50 mcg) by mouth daily                          * This list has 2 medication(s) that are the same as other medications prescribed for you. Read the directions carefully, and ask your doctor or other care provider to review them with you.                   Where to Get Your Medications        These medications will be mailed to you       From: [facility] - [place], [place] - [address] Phone: [phone]   Apremilast 10 & 20 & 30 MG Tbpk  gabapentin 100 MG capsule

## 2024-03-04 ENCOUNTER — TELEPHONE (OUTPATIENT)
Dept: TRANSPLANT | Facility: CLINIC | Age: 74
End: 2024-03-04
Payer: MEDICARE

## 2024-03-04 DIAGNOSIS — Z76.82 ORGAN TRANSPLANT CANDIDATE: ICD-10-CM

## 2024-03-04 DIAGNOSIS — N18.6 ESRD (END STAGE RENAL DISEASE) (H): Primary | ICD-10-CM

## 2024-03-04 NOTE — TELEPHONE ENCOUNTER
Updated pts daughter that referral for mental health has been placed. Pt verbalized understanding of information and has no further questions. Encouraged to reach out if questions arise.

## 2024-03-06 ENCOUNTER — OFFICE VISIT (OUTPATIENT)
Dept: OPHTHALMOLOGY | Facility: CLINIC | Age: 74
End: 2024-03-06
Attending: OPHTHALMOLOGY
Payer: MEDICARE

## 2024-03-06 DIAGNOSIS — E11.3213 TYPE 2 DIABETES MELLITUS WITH MILD NONPROLIFERATIVE RETINOPATHY OF BOTH EYES AND MACULAR EDEMA, UNSPECIFIED WHETHER LONG TERM INSULIN USE (H): ICD-10-CM

## 2024-03-06 PROCEDURE — 99213 OFFICE O/P EST LOW 20 MIN: CPT | Performed by: OPHTHALMOLOGY

## 2024-03-06 PROCEDURE — 92134 CPTRZ OPH DX IMG PST SGM RTA: CPT | Performed by: OPHTHALMOLOGY

## 2024-03-06 PROCEDURE — G0463 HOSPITAL OUTPT CLINIC VISIT: HCPCS | Performed by: OPHTHALMOLOGY

## 2024-03-06 ASSESSMENT — REFRACTION_WEARINGRX
OS_SPHERE: -4.00
OS_AXIS: 130
OD_SPHERE: -1.00
OD_AXIS: 072
OS_CYLINDER: +1.75
SPECS_TYPE: BIFOCAL
OD_CYLINDER: +1.00
OS_ADD: +2.50
OD_ADD: +2.50

## 2024-03-06 ASSESSMENT — SLIT LAMP EXAM - LIDS
COMMENTS: NORMAL
COMMENTS: NORMAL

## 2024-03-06 ASSESSMENT — TONOMETRY
IOP_METHOD: TONOPEN
OS_IOP_MMHG: 17
OD_IOP_MMHG: 16

## 2024-03-06 ASSESSMENT — CUP TO DISC RATIO
OD_RATIO: 0.3
OS_RATIO: 0.3

## 2024-03-06 ASSESSMENT — VISUAL ACUITY
OS_CC: 20/30
OD_CC: 20/40
METHOD: SNELLEN - LINEAR

## 2024-03-06 ASSESSMENT — EXTERNAL EXAM - RIGHT EYE: OD_EXAM: NORMAL

## 2024-03-06 NOTE — PROGRESS NOTES
CC: follow up  NPDR and DME.   HPI: 74 year old female with history of  NPDR and DME.   Interim: no changes in vision;  No new flashes or floaters; no eye pain  Imaging:  Optical Coherence Tomography: 03/06/24   right eye: central stable lamellar hole. Mild  IRF, choroid normal, hyaloid . Mild Epiretinal membrane.   Left eye: irregular fovea, irregular inner retinal contour, mild IRF rashid nasally, microaneurysms. stable    fluorescein angiography: 11/01/23    Right eye: blockage of fluorescein angiography on the areas of heme; microaneurysms; mild late Diabetic macular edema and PP leakage.  Few capillary non perfusion   Left eye: microaneurysms; mild late Diabetic macular edema; staining of the peripheral Chorioretinal  scars. Few capillary non perfusion   Stable  No neovascularization elsewhere; no neovascularization of the disc.    Assessment & Plan:    # Moderate nonproliferative diabetic retinopathy both eyes   - stable.    # mild Diabetic macular edema both eyes  - stable  observe    # H/o right eye - retinal macroaneurysm    - at the sup temp margin of disc may contribute to macular edema;    - status post avastin inj x2 for cystoid macular edema with improvement   - No longer present- previously observed to be thrombosing   - Last DOROTEO 8/15/19 (#1)    #. Mod Hypertensive retinopathy both eyes   - Stage 5 kidney disease   - blood pressure control has been poor in 160s/90s-100s  - improved control per patient recently     # Pseudophakia both eyes  8-11-20 right eye   8-17-22 YAG OD    #. History of Macula hole repair left eye by Dr. Daniel  S/p PPV, mp, air-fluid exchange, SF6 gas infusion, left eye 2/14/2012 by Dr. Daniel    - residual lamellar hole, but outer retina closed  08/17/22  Peripheral Chorioretinal  Scars with  IN area of shallow elevation anterior to the scars. questionable shallow SRF with demarcation line, possible old laser and not extending posteriorly.   These are chronic findings  and present in prior optos photos from 2019.   Stable, unchanged 04/26/23   Will monitor.      # Dry eye syndrome both eyes   Status post punctal plugs   artificial tears  As needed and warm compresses     PLAN:   - Blood pressure (<120/80) and blood glucose (HbA1c <7.0) control discussed with patient.   - Patient advised that failure to adequately control each may lead to vision loss. The patient expressed understanding.  - follow up in 4-6 months with Optical Coherence Tomography both eyes and fluorescein angiography transits right eye    ~~~~~~~~~~~~~~~~~~~~~~~~~~~~~~~~~~   Complete documentation of historical and exam elements from today's encounter can be found in the full encounter summary report (not reduplicated in this progress note).  I personally obtained the chief complaint(s) and history of present illness.  I confirmed and edited as necessary the review of systems, past medical/surgical history, family history, social history, and examination findings as documented by others; and I examined the patient myself.  I personally reviewed the relevant tests, images, and reports as documented above.  I formulated and edited as necessary the assessment and plan and discussed the findings and management plan with the patient and family    Leanne Jett MD   of Ophthalmology.  Retina Service   Department of Ophthalmology and Visual Neurosciences   [facility] [place]  Phone: [phone]   Fax: [phone]

## 2024-03-06 NOTE — NURSING NOTE
Chief Complaints and History of Present Illnesses   Patient presents with    Follow Up     4 month Diabetic eye exam follow up   See gold color floater LE on/off x years  Blood sugar not taken today   Last A1C above 7 taken 1 week ago  Lisbeth KELLY 3:27 PM March 6, 2024        Chief Complaint(s) and History of Present Illness(es)       Follow Up              Laterality: both eyes    Associated symptoms: flashes, floaters and itching.  Negative for eye pain    Comments: 4 month Diabetic eye exam follow up   See gold color floater LE on/off x years  Blood sugar not taken today   Last A1C above 7 taken 1 week ago  Lisbeth KELLY 3:27 PM March 6, 2024
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
## 2024-03-13 ENCOUNTER — MEDICAL CORRESPONDENCE (OUTPATIENT)
Dept: HEALTH INFORMATION MANAGEMENT | Facility: CLINIC | Age: 74
End: 2024-03-13
Payer: MEDICARE
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
## 2024-04-01 ENCOUNTER — MEDICAL CORRESPONDENCE (OUTPATIENT)
Dept: HEALTH INFORMATION MANAGEMENT | Facility: CLINIC | Age: 74
End: 2024-04-01
Payer: MEDICARE

## 2024-04-05 NOTE — PROGRESS NOTES
CC: follow up macula edema    HPI: Supriya Herr is a  69 year old year-old patient referred by Dr Baeza for evaluation and treat of DMII with mild nonproliferative diabetic retinopathy and cataract.     Interval history:  Vision seems stable compared to last visit. Reports blood sugar control good and last A1c was 5.5 8/5/2019.   No changes since last visit. Is ready for cataract surgery     PMH:  History of Diabetes mellitus on insulin.   Past ocular history: History of cataract surgery left eye.   history of Macular hole left eye.   Status post Pars plana vitrectomy, membrane peel, air-fluid exchange, SF6 gas infusion, left eye 2/14/2012 by Dr. Daniel     Retinal Imaging:  OCT 10-24-19  RE: few foveal cystic with much improved IRF; retina thinning and partial thickness Macula hole. LE:  Retina irregularity, trace sup Epiretinal membrane; retina thinning. Tiny cystic edema.  -> 271 -> 260 -> 261    optos pic 04/11/19 consistent with exam    fluorescein angiography: 04/11/19   Right eye: blockage of fluorescein angiography on the areas of heme; few microaneurysms; mild late Diabetic macular edema and PP leakage  No neovascularization elsewhere; no neovascularization of the disc.  Left eye: few microaneurysms; mild late Diabetic macular edema; staining of the peripheral Chorioretinal  scars    Assessment & Plan:  1. Moderate nonproliferative diabetic retinopathy and mild - Diabetic macular edema both eyes  - mild stable edema in right eye, stable in left eye   Stable- observe    2. 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   - Patient advised that failure to adequately control each may lead to vision loss. The patient expressed  understanding.    Plan to observe today    3. Mod Hypertensive retinopathy   - Stage 5 kidney disease  - Considering HD   - blood pressure control has been poor in 160s/90s-100s  - Likely contributing to macular edema    4. Cataract right eye   - Becoming visually significant   - intraocular lens calcs 7/11/19  - best corrected visual acuity 20/50 right eye   - Plan for Cataract extraction intraocular lens placement in next few months     5. Pseudophakia left eye  best corrected visual acuity 20/30 left eye   Observe    6. History of Macula hole repair left eye by Dr. Daniel  S/p PPV, mp, air-fluid exchange, SF6 gas infusion, left eye 2/14/2012 by Dr. Daniel    Macula hole closed  Observe    7. Dry eye syndrome   Status post punctal plugs   artificial tears  As needed and warm compresses      Plan: to observe,   return to clinic 4-6 weeks for OCT each eye  Hold on inj today   plan for fluorescein angiography transits right eye next follow up     ~~~~~~~~~~~~~~~~~~~~~~~~~~~~~~~~~~   Complete documentation of historical and exam elements from today's encounter can be found in the full encounter summary report (not reduplicated in this progress note).  I personally obtained the chief complaint(s) and history of present illness.  I confirmed and edited as necessary the review of systems, past medical/surgical history, family history, social history, and examination findings as documented by others; and I examined the patient myself.  I personally reviewed the relevant tests, images, and reports as documented above.  I formulated and edited as necessary the assessment and plan and discussed the findings and management plan with the patient and family    Leanne Jett MD   of Ophthalmology.  Retina Service   Department of Ophthalmology and Visual Neurosciences   [facility]  Phone: [phone]   Fax: [phone]      24

## 2024-04-23 ENCOUNTER — TELEPHONE (OUTPATIENT)
Dept: WOUND CARE | Facility: CLINIC | Age: 74
End: 2024-04-23
Payer: MEDICARE

## 2024-04-23 ENCOUNTER — TELEPHONE (OUTPATIENT)
Dept: INTERVENTIONAL RADIOLOGY/VASCULAR | Facility: CLINIC | Age: 74
End: 2024-04-23
Payer: MEDICARE

## 2024-04-23 DIAGNOSIS — N18.6 ESRD (END STAGE RENAL DISEASE) ON DIALYSIS (H): Primary | ICD-10-CM

## 2024-04-23 DIAGNOSIS — Z99.2 ESRD (END STAGE RENAL DISEASE) ON DIALYSIS (H): ICD-10-CM

## 2024-04-23 DIAGNOSIS — Z99.2 ESRD (END STAGE RENAL DISEASE) ON DIALYSIS (H): Primary | ICD-10-CM

## 2024-04-23 DIAGNOSIS — T82.49XD CLOTTED DIALYSIS ACCESS, SUBSEQUENT ENCOUNTER (H): ICD-10-CM

## 2024-04-23 DIAGNOSIS — N18.6 ESRD (END STAGE RENAL DISEASE) ON DIALYSIS (H): ICD-10-CM

## 2024-04-23 NOTE — CONSULTS
Outpatient Fistulagram IR Referral  04/23/24    Referring Provider: Dr. Basilio   IR Referral Request: Left upper arm fistulagram Left Upper Extremity Brachial Artery to Axillary Vein Arteriovenous Bovine Graft   Procedure Approved for: Left upper extremity brachial to axillary vein graft fistulagram    Brief History:    Supriya Herr is a 74 year old female with history of ESRD, DM 2, CHF, HDL, HTN, chronic pain, sleep apnea, BCC.  Thrombosed fistula.     Date created-5/7/2020  Left Upper Extremity Brachial Artery to Axillary Vein Arteriovenous Bovine Graft   2nd fistula created 11/16/2020 right brachial artery to cephalic vein    Where and by whom-[facility] 1st fistula, Dr. Banks 11/16/2020    Revised and when-4/16/20 RIGHT ARM Arteriovenous fistula banding- right upper arm for steal syndrome    Recurrent steal syndrome 2/2/2022 fistula ligation to right brachiocephalic fistula s/p banding     Date of last US-2/6/23  Duplex ultrasound of the left extremity dialysis graft     Impression:     1. Arterial inflow: Patent. Brachial artery distal to the anastomosis  is antegrade towards the hand and wrist.     2. Arterial anastomosis: Patent, 3 mm.     3. Intra-graft: Patent without evidence for stenosis. Adequate flow  volumes measuring up to 2760 mL/m.     4. Venous anastomosis: Patent stent graft. There does appear to be a  mild stenosis with turbulence and aliasing just central to the stent.     5. Mild to moderate central venous stenosis with turbulence and  aliasing involving the medial left subclavian vein.Duplex ultrasound of the left extremity dialysis graft     Date of last IR procedure-NA     Problem with dialysis-thrombosed       Procedure order placed.    Requesting team, Dr. Basilio, made aware of IR recommendations via Epic messaging.    PENELOPE Murillo CNP  Interventional Radiology   IR on-call pager: [phone]

## 2024-04-23 NOTE — TELEPHONE ENCOUNTER
Called preferred number; LVM regarding insufficient documentation from last visit 1/17/24 to support DME coverage (not draining, healed) as well as visit greater than 30 days prior to request; thus would pay OOP for supply if needed; if wound is open, draining, suggested to call clinic to make appointment; call clinic if questions regarding this message.

## 2024-04-24 ENCOUNTER — APPOINTMENT (OUTPATIENT)
Dept: INTERVENTIONAL RADIOLOGY/VASCULAR | Facility: CLINIC | Age: 74
End: 2024-04-24
Attending: INTERNAL MEDICINE
Payer: MEDICARE

## 2024-04-24 ENCOUNTER — APPOINTMENT (OUTPATIENT)
Dept: MEDSURG UNIT | Facility: CLINIC | Age: 74
End: 2024-04-24
Attending: INTERNAL MEDICINE
Payer: MEDICARE

## 2024-04-24 ENCOUNTER — HOSPITAL ENCOUNTER (OUTPATIENT)
Facility: CLINIC | Age: 74
Discharge: HOME | End: 2024-04-24
Attending: INTERNAL MEDICINE | Admitting: RADIOLOGY
Payer: MEDICARE

## 2024-04-24 VITALS
TEMPERATURE: 97.4 F | HEIGHT: 66 IN | DIASTOLIC BLOOD PRESSURE: 58 MMHG | BODY MASS INDEX: 35.82 KG/M2 | RESPIRATION RATE: 18 BRPM | HEART RATE: 59 BPM | OXYGEN SATURATION: 95 % | SYSTOLIC BLOOD PRESSURE: 151 MMHG | WEIGHT: 222.88 LBS

## 2024-04-24 DIAGNOSIS — T82.49XD CLOTTED DIALYSIS ACCESS, SUBSEQUENT ENCOUNTER (H): ICD-10-CM

## 2024-04-24 DIAGNOSIS — Z99.2 ESRD (END STAGE RENAL DISEASE) ON DIALYSIS (H): ICD-10-CM

## 2024-04-24 DIAGNOSIS — N18.6 ESRD (END STAGE RENAL DISEASE) ON DIALYSIS (H): ICD-10-CM

## 2024-04-24 LAB
ACT BLD: 217 SECONDS (ref 74–150)
ANION GAP SERPL CALCULATED.3IONS-SCNC: 17 MMOL/L (ref 7–15)
BUN SERPL-MCNC: 86.1 MG/DL (ref 8–23)
CALCIUM SERPL-MCNC: 9.4 MG/DL (ref 8.8–10.2)
CHLORIDE SERPL-SCNC: 104 MMOL/L (ref 98–107)
CREAT SERPL-MCNC: 9.02 MG/DL (ref 0.51–0.95)
DEPRECATED HCO3 PLAS-SCNC: 20 MMOL/L (ref 22–29)
EGFRCR SERPLBLD CKD-EPI 2021: 4 ML/MIN/1.73M2
ERYTHROCYTE [DISTWIDTH] IN BLOOD BY AUTOMATED COUNT: 14.2 % (ref 10–15)
GLUCOSE SERPL-MCNC: 119 MG/DL (ref 70–99)
HCT VFR BLD AUTO: 31.8 % (ref 35–47)
HGB BLD-MCNC: 10.1 G/DL (ref 11.7–15.7)
INR PPP: 1.11 (ref 0.85–1.15)
MCH RBC QN AUTO: 32.2 PG (ref 26.5–33)
MCHC RBC AUTO-ENTMCNC: 31.8 G/DL (ref 31.5–36.5)
MCV RBC AUTO: 101 FL (ref 78–100)
PLATELET # BLD AUTO: 139 10E3/UL (ref 150–450)
POTASSIUM SERPL-SCNC: 5.2 MMOL/L (ref 3.4–5.3)
RBC # BLD AUTO: 3.14 10E6/UL (ref 3.8–5.2)
SODIUM SERPL-SCNC: 141 MMOL/L (ref 135–145)
WBC # BLD AUTO: 6.3 10E3/UL (ref 4–11)

## 2024-04-24 PROCEDURE — 272N000506 HC NEEDLE CR6

## 2024-04-24 PROCEDURE — 82374 ASSAY BLOOD CARBON DIOXIDE: CPT | Performed by: NURSE PRACTITIONER

## 2024-04-24 PROCEDURE — 250N000011 HC RX IP 250 OP 636: Performed by: RADIOLOGY

## 2024-04-24 PROCEDURE — C1725 CATH, TRANSLUMIN NON-LASER: HCPCS

## 2024-04-24 PROCEDURE — 36905 THRMBC/NFS DIALYSIS CIRCUIT: CPT | Mod: GC | Performed by: RADIOLOGY

## 2024-04-24 PROCEDURE — 36415 COLL VENOUS BLD VENIPUNCTURE: CPT | Performed by: NURSE PRACTITIONER

## 2024-04-24 PROCEDURE — 999N000248 HC STATISTIC IV INSERT WITH US BY RN

## 2024-04-24 PROCEDURE — C1769 GUIDE WIRE: HCPCS

## 2024-04-24 PROCEDURE — 85347 COAGULATION TIME ACTIVATED: CPT

## 2024-04-24 PROCEDURE — 99152 MOD SED SAME PHYS/QHP 5/>YRS: CPT | Mod: GC | Performed by: RADIOLOGY

## 2024-04-24 PROCEDURE — C1757 CATH, THROMBECTOMY/EMBOLECT: HCPCS

## 2024-04-24 PROCEDURE — 999N000134 HC STATISTIC PP CARE STAGE 3

## 2024-04-24 PROCEDURE — 85048 AUTOMATED LEUKOCYTE COUNT: CPT | Performed by: NURSE PRACTITIONER

## 2024-04-24 PROCEDURE — 84295 ASSAY OF SERUM SODIUM: CPT | Performed by: NURSE PRACTITIONER

## 2024-04-24 PROCEDURE — 76937 US GUIDE VASCULAR ACCESS: CPT | Mod: 26 | Performed by: RADIOLOGY

## 2024-04-24 PROCEDURE — 250N000009 HC RX 250: Performed by: RADIOLOGY

## 2024-04-24 PROCEDURE — 272N000211 HC BALLOON (NON-PTA) CR8

## 2024-04-24 PROCEDURE — 258N000003 HC RX IP 258 OP 636: Performed by: RADIOLOGY

## 2024-04-24 PROCEDURE — 99153 MOD SED SAME PHYS/QHP EA: CPT

## 2024-04-24 PROCEDURE — 85610 PROTHROMBIN TIME: CPT | Performed by: NURSE PRACTITIONER

## 2024-04-24 PROCEDURE — 999N000142 HC STATISTIC PROCEDURE PREP ONLY

## 2024-04-24 PROCEDURE — 272N000564 HC SHEATH CR2

## 2024-04-24 PROCEDURE — 99152 MOD SED SAME PHYS/QHP 5/>YRS: CPT

## 2024-04-24 PROCEDURE — C1887 CATHETER, GUIDING: HCPCS

## 2024-04-24 PROCEDURE — 258N000003 HC RX IP 258 OP 636: Performed by: NURSE PRACTITIONER

## 2024-04-24 PROCEDURE — 255N000002 HC RX 255 OP 636: Performed by: RADIOLOGY

## 2024-04-24 PROCEDURE — 250N000011 HC RX IP 250 OP 636: Performed by: NURSE PRACTITIONER

## 2024-04-24 RX ORDER — ONDANSETRON 2 MG/ML
4 INJECTION INTRAMUSCULAR; INTRAVENOUS
Status: DISCONTINUED | OUTPATIENT
Start: 2024-04-24 | End: 2024-04-24 | Stop reason: HOSPADM

## 2024-04-24 RX ORDER — NALOXONE HYDROCHLORIDE 0.4 MG/ML
0.4 INJECTION, SOLUTION INTRAMUSCULAR; INTRAVENOUS; SUBCUTANEOUS
Status: DISCONTINUED | OUTPATIENT
Start: 2024-04-24 | End: 2024-04-24 | Stop reason: HOSPADM

## 2024-04-24 RX ORDER — HEPARIN SODIUM 200 [USP'U]/100ML
1 INJECTION, SOLUTION INTRAVENOUS EVERY 5 MIN PRN
Status: DISCONTINUED | OUTPATIENT
Start: 2024-04-24 | End: 2024-04-24 | Stop reason: HOSPADM

## 2024-04-24 RX ORDER — IODIXANOL 320 MG/ML
100 INJECTION, SOLUTION INTRAVASCULAR ONCE
Status: COMPLETED | OUTPATIENT
Start: 2024-04-24 | End: 2024-04-24

## 2024-04-24 RX ORDER — NALOXONE HYDROCHLORIDE 0.4 MG/ML
0.2 INJECTION, SOLUTION INTRAMUSCULAR; INTRAVENOUS; SUBCUTANEOUS
Status: DISCONTINUED | OUTPATIENT
Start: 2024-04-24 | End: 2024-04-24 | Stop reason: HOSPADM

## 2024-04-24 RX ORDER — SODIUM CHLORIDE 9 MG/ML
INJECTION, SOLUTION INTRAVENOUS CONTINUOUS
Status: DISCONTINUED | OUTPATIENT
Start: 2024-04-24 | End: 2024-04-24 | Stop reason: HOSPADM

## 2024-04-24 RX ORDER — LIDOCAINE 40 MG/G
CREAM TOPICAL
Status: DISCONTINUED | OUTPATIENT
Start: 2024-04-24 | End: 2024-04-24 | Stop reason: HOSPADM

## 2024-04-24 RX ORDER — CEFAZOLIN SODIUM 2 G/100ML
2 INJECTION, SOLUTION INTRAVENOUS
Status: COMPLETED | OUTPATIENT
Start: 2024-04-24 | End: 2024-04-24

## 2024-04-24 RX ORDER — FENTANYL CITRATE 50 UG/ML
25-50 INJECTION, SOLUTION INTRAMUSCULAR; INTRAVENOUS EVERY 5 MIN PRN
Status: DISCONTINUED | OUTPATIENT
Start: 2024-04-24 | End: 2024-04-24 | Stop reason: HOSPADM

## 2024-04-24 RX ORDER — FLUMAZENIL 0.1 MG/ML
0.2 INJECTION, SOLUTION INTRAVENOUS
Status: DISCONTINUED | OUTPATIENT
Start: 2024-04-24 | End: 2024-04-24 | Stop reason: HOSPADM

## 2024-04-24 RX ORDER — HEPARIN SODIUM 1000 [USP'U]/ML
500-6000 INJECTION, SOLUTION INTRAVENOUS; SUBCUTANEOUS
Status: COMPLETED | OUTPATIENT
Start: 2024-04-24 | End: 2024-04-24

## 2024-04-24 RX ADMIN — SODIUM CHLORIDE: 9 INJECTION, SOLUTION INTRAVENOUS at 09:21

## 2024-04-24 RX ADMIN — LIDOCAINE HYDROCHLORIDE 8 ML: 10 INJECTION, SOLUTION EPIDURAL; INFILTRATION; INTRACAUDAL; PERINEURAL at 11:50

## 2024-04-24 RX ADMIN — ALTEPLASE 10 MG/HR: 2.2 INJECTION, POWDER, LYOPHILIZED, FOR SOLUTION INTRAVENOUS at 11:51

## 2024-04-24 RX ADMIN — CEFAZOLIN SODIUM 2 G: 2 INJECTION, SOLUTION INTRAVENOUS at 09:21

## 2024-04-24 RX ADMIN — HEPARIN SODIUM 1 BAG: 200 INJECTION, SOLUTION INTRAVENOUS at 11:51

## 2024-04-24 RX ADMIN — MIDAZOLAM 1.5 MG: 1 INJECTION INTRAMUSCULAR; INTRAVENOUS at 11:52

## 2024-04-24 RX ADMIN — HEPARIN SODIUM 7000 UNITS: 1000 INJECTION INTRAVENOUS; SUBCUTANEOUS at 11:53

## 2024-04-24 RX ADMIN — IODIXANOL 50 ML: 320 INJECTION, SOLUTION INTRAVASCULAR at 11:58

## 2024-04-24 RX ADMIN — FENTANYL CITRATE 75 MCG: 50 INJECTION, SOLUTION INTRAMUSCULAR; INTRAVENOUS at 11:52

## 2024-04-24 ASSESSMENT — ACTIVITIES OF DAILY LIVING (ADL)
ADLS_ACUITY_SCORE: 41

## 2024-04-24 NOTE — PROGRESS NOTES
Tolerating bedrest.  Upper woggle suture removed at 13:45.  Attempt to removed lower Woggle suture, but site oozed and Woggle reapplied.  VSS.  Denies pain.  Resting in bed.

## 2024-04-24 NOTE — PROGRESS NOTES
Arrived from home for a left arm fistulagram.  Unable to feel a pulse or thrill at fistula.  Denies pain.  PIV placed.  H&P current.  Consent obtained.  Will be ready for procedure when labs are resulted.

## 2024-04-24 NOTE — DISCHARGE INSTRUCTIONS
[facility]      Interventional Radiology  Discharge Instructions Following Fistulogram      You may resume normal activities as tolerated, but avoid any strenuous activity or heavy lifting (>10 lbs.) involving your access arm.    Elevate and rest your arm as much as possible for the first 24 hours after the procedure.  This will promote healing and reduce swelling.     If bleeding or oozing should occur, apply fingertip pressure to puncture site to control bleeding, but avoid excessive pressure as this may clot off the fistula.  Hold light pressure for 10 minutes, or until bleeding/oozing is controlled.  If excessive bleeding is noted or if you are having difficulty controlling the bleeding with direct pressure, call 911.     If you develop fever, chills, excessive pain/tenderness or drainage at the puncture site, or have questions call your Doctor or Dialysis Center.     If you received sedation for your procedure: An adult should stay with you for 24 hours, Do Not drive a motor vehicle, operate machinery, or drink alcoholic beverages for 24 hours.     You may resume your normal medications immediately    If you notice sudden loss of pulse or thrill (buzzing feeling) in your fistula, contact your Doctor or Dialysis unit immediately.           [facility] INTERVENTIONAL RADIOLOGY DEPARTMENT  Procedure Physician: Dr. Britton                                                        Date of procedure: April 24, 2024  Telephone Numbers: [phone] Monday-Friday 7:30 am to 4:00 pm  [phone] After 4:00 pm Monday-Friday, Weekends & Holidays.   Ask for the Interventional Radiologist on call.  Someone is on call 24 hrs/day  [facility] toll free number: [phone] Monday-Friday 8:00 am to 4:30 pm  [facility] Emergency Dept: [phone]

## 2024-04-24 NOTE — PROGRESS NOTES
Returned from IR s/p left arm fistula.  Left arm fistula with moderate pulse; no thrill ... Two Woggles in place.  Denies pain.  Resting in bed.

## 2024-04-24 NOTE — PROGRESS NOTES
Patient Name: Supriya Herr  Medical Record Number: [id number]  Today's Date: 4/24/2024    Procedure: Left upper extremity dialysis graft fistulagram, thrombectomy and possible tunneled line placement.  Proceduralist: MD Chayo., MD [place]  Pathology present: n/a    Procedure Start: 1028  Procedure end: 1200  Sedation medications administered: Fentanyl: 75 mcg Versed:1.5mg   Heparin 7000u @ 1113      Report given to: 2A, RN  : n/a    Other Notes: Pt arrived to IR room 1 from . Consent reviewed. Pt denies any questions or concerns regarding procedure. Pt positioned supine and monitored per protocol.  2 woggles left in place. Pt tolerated procedure without any noted complications. Pt transferred back to .    Celeste Preston, SHANTA

## 2024-04-24 NOTE — PROCEDURES
[facility]    Procedure: IR Procedure Note    Date/Time: 4/24/2024 12:05 PM    Performed by: Blaise Boyle MD  Authorized by: Blaise Boyle MD  IR Fellow Physician: lBaise Boyle  Other(s) attending procedure: Ashutosh Domingo MD      UNIVERSAL PROTOCOL   Site Marked: NA  Prior Images Obtained and Reviewed:  Yes  Required items: Required blood products, implants, devices and special equipment available    Patient identity confirmed:  Verbally with patient, arm band, provided demographic data and hospital-assigned identification number  Patient was reevaluated immediately before administering moderate or deep sedation or anesthesia  Confirmation Checklist:  Patient's identity using two indicators, relevant allergies, procedure was appropriate and matched the consent or emergent situation and correct equipment/implants were available  Time out: Immediately prior to the procedure a time out was called    Universal Protocol: the Joint Commission Universal Protocol was followed    Preparation: Patient was prepped and draped in usual sterile fashion    ESBL (mL):  5     ANESTHESIA    Anesthesia:  Local infiltration  Local Anesthetic:  Lidocaine 1% without epinephrine  Anesthetic Total (mL):  6      SEDATION  Patient Sedated: Yes    Sedation Type:  Moderate (conscious) sedation  Sedation:  Fentanyl and midazolam  Vital signs: Vital signs monitored during sedation    See dictated procedure note for full details.  Findings: See dictation.    Specimens: none    Complications: None    Condition: Stable    Plan: Return to 2A.  Bedrest for 1 hour.  IR fellow to remove woggle in 1 hour.      PROCEDURE  Describe Procedure: Successful declot of left arm dialysis access.  Patient Tolerance:  Patient tolerated the procedure well with no immediate complications  Length of time physician/provider present for 1:1 monitoring during sedation: 75

## 2024-04-24 NOTE — PROGRESS NOTES
Dr. Acosta removed last Woggle suture with no oozing.  Supriya denies pain or discomfort.  Reviewed discharge instructions with Supriya.  PIV removed.  Discharged to home with daughter.

## 2024-04-24 NOTE — PRE-PROCEDURE
GENERAL PRE-PROCEDURE:   Procedure:  LUE fistulogram, declotting, possible intervention, possible TCVC placement    Verbal consent obtained?: Yes    Written consent obtained?: Yes    Risks and benefits: Risks, benefits and alternatives were discussed    Consent given by:  Patient  Patient states understanding of procedure being performed: Yes    Patient's understanding of procedure matches consent: Yes    Procedure consent matches procedure scheduled: Yes    Expected level of sedation:  Moderate  Appropriately NPO:  Yes  ASA Class:  2  Mallampati  :  Grade 2- soft palate, base of uvula, tonsillar pillars, and portion of posterior pharyngeal wall visible  Lungs:  Lungs clear with good breath sounds bilaterally  Heart:  Normal heart sounds and rate  History & Physical reviewed:  History and physical reviewed and no updates needed  Statement of review:  I have reviewed the lab findings, diagnostic data, medications, and the plan for sedation

## 2024-04-26 ENCOUNTER — OFFICE VISIT (OUTPATIENT)
Dept: ORTHOPEDICS | Facility: CLINIC | Age: 74
End: 2024-04-26
Payer: MEDICARE

## 2024-04-26 DIAGNOSIS — I73.9 PAD (PERIPHERAL ARTERY DISEASE) (H): ICD-10-CM

## 2024-04-26 DIAGNOSIS — I73.89 OTHER SPECIFIED PERIPHERAL VASCULAR DISEASES (H): Primary | ICD-10-CM

## 2024-04-26 DIAGNOSIS — B35.1 OM (ONYCHOMYCOSIS): ICD-10-CM

## 2024-04-26 PROCEDURE — 99213 OFFICE O/P EST LOW 20 MIN: CPT | Mod: 25 | Performed by: PODIATRIST

## 2024-04-26 PROCEDURE — 11720 DEBRIDE NAIL 1-5: CPT | Mod: Q7 | Performed by: PODIATRIST

## 2024-04-26 NOTE — LETTER
4/26/2024         RE: Supriya Herr  [address]  [place] [address]        Dear Colleague,    Thank you for referring your patient, Supriya Herr, to the [facility] [place]. Please see a copy of my visit note below.    Chief Complaint   Patient presents with    RECHECK     Return for nails                 Allergies   Allergen Reactions    Ampicillin-Sulbactam Sodium Rash     No evidence SJS, but very uncomfortable and precipitated multiple provider visits. Would not use penicillins again if other options available.     Penicillins Rash         Subjective: Supriya is a 74 year old female who presents to the clinic today for a diabetic foot exam and management.  Her nails do get long.  She has a history of amputation and from previous notes, we have documented that she has nonpalpable DP and PT pulses. She just had a fistulagram.      Objective    Hemoglobin A1C   Date Value Ref Range Status   04/21/2023 6.8 (H) 0.0 - 5.6 % Final     Comment:     Normal <5.7%   Prediabetes 5.7-6.4%    Diabetes 6.5% or higher     Note: Adopted from ADA consensus guidelines.   04/09/2021 6.2 (H) 0 - 5.6 % Final     Comment:     Normal <5.7% Prediabetes 5.7-6.4%  Diabetes 6.5% or higher - adopted from ADA   consensus guidelines.           Non-palpable DP and PT pulses L.   Equinus noted BL. Pes planus with rigid toe deformities noted to lesser digits on the right. Left AKA noted.   Nails are thickened, discolored, elongated, with subungual debris consistent with onychomycosis.          Assessment: DM2 with left AKA and neuropathy. She has severe vasculopathy.  Onychomycosis.   Tyloma     Plan:   - Pt seen and evaluated  - Nails debrided x 5.  - Cont compression socks.  - See again in 3 months.    Reason For Visit:   Chief Complaint   Patient presents with    RECHECK     Return for nails        There were no vitals taken for this visit.    Pain Assessment  Patient Currently in Pain:  Mariano Collins, SHERI Pack, DPM

## 2024-04-26 NOTE — PROGRESS NOTES
Chief Complaint   Patient presents with    RECHECK     Return for nails                 Allergies   Allergen Reactions    Ampicillin-Sulbactam Sodium Rash     No evidence SJS, but very uncomfortable and precipitated multiple provider visits. Would not use penicillins again if other options available.     Penicillins Rash         Subjective: Supriya is a 74 year old female who presents to the clinic today for a diabetic foot exam and management.  Her nails do get long.  She has a history of amputation and from previous notes, we have documented that she has nonpalpable DP and PT pulses. She just had a fistulagram.      Objective    Hemoglobin A1C   Date Value Ref Range Status   04/21/2023 6.8 (H) 0.0 - 5.6 % Final     Comment:     Normal <5.7%   Prediabetes 5.7-6.4%    Diabetes 6.5% or higher     Note: Adopted from ADA consensus guidelines.   04/09/2021 6.2 (H) 0 - 5.6 % Final     Comment:     Normal <5.7% Prediabetes 5.7-6.4%  Diabetes 6.5% or higher - adopted from ADA   consensus guidelines.           Non-palpable DP and PT pulses L.   Equinus noted BL. Pes planus with rigid toe deformities noted to lesser digits on the right. Left AKA noted.   Nails are thickened, discolored, elongated, with subungual debris consistent with onychomycosis.          Assessment: DM2 with left AKA and neuropathy. She has severe vasculopathy.  Onychomycosis.   Tyloma     Plan:   - Pt seen and evaluated  - Nails debrided x 5.  - Cont compression socks.  - See again in 3 months.

## 2024-04-26 NOTE — PROGRESS NOTES
Reason For Visit:   Chief Complaint   Patient presents with    RECHECK     Return for nails        There were no vitals taken for this visit.    Pain Assessment  Patient Currently in Pain: Mariano Collins CMA

## 2024-04-30 NOTE — NURSING NOTE
----- Message from Jean Carlos Medley MD sent at 4/30/2024  1:28 PM CDT -----  Inform patient MRI shows severely pinched nerves on the right.Refer to neurosurgery   Attempted to reach patient via phone unsuccessful left message to call back to schedule PSG and follow up appointment.           FABIEN Bragg  [facility]

## 2024-05-03 ENCOUNTER — TELEPHONE (OUTPATIENT)
Dept: TRANSPLANT | Facility: CLINIC | Age: 74
End: 2024-05-03
Payer: MEDICARE

## 2024-05-03 DIAGNOSIS — I10 HYPERTENSION: ICD-10-CM

## 2024-05-03 DIAGNOSIS — Z76.82 ORGAN TRANSPLANT CANDIDATE: ICD-10-CM

## 2024-05-03 DIAGNOSIS — N18.6 ESRD (END STAGE RENAL DISEASE) (H): Primary | ICD-10-CM

## 2024-05-03 NOTE — TELEPHONE ENCOUNTER
Called Pts daughter to discuss orders placed for CT a/p and iliac US. Reviewed she has not be able to set up counseling yet. She will try to call scheduling and let me know when this is arranged. She was directed to go to an outside counselor and this has been on hold for arrange appts due to forms needing to be filled out. They will notify writer once counselor visit arranged.

## 2024-05-10 ENCOUNTER — TELEPHONE (OUTPATIENT)
Dept: ENDOCRINOLOGY | Facility: CLINIC | Age: 74
End: 2024-05-10
Payer: MEDICARE

## 2024-05-10 NOTE — TELEPHONE ENCOUNTER
Patient confirmed scheduled appointment:  Date: 8/30   Time: 11;30   Visit type: return diabetes  Provider: Anh  Location: virtual   Testing/imaging:   Additional notes: spoke with daughter from [employer] message; added appt to wait list     Kory Singh on 5/10/2024 at 8:34 AM

## 2024-05-16 ENCOUNTER — MYC MEDICAL ADVICE (OUTPATIENT)
Dept: TRANSPLANT | Facility: CLINIC | Age: 74
End: 2024-05-16
Payer: MEDICARE

## 2024-05-16 DIAGNOSIS — F41.1 GAD (GENERALIZED ANXIETY DISORDER): ICD-10-CM

## 2024-05-17 ENCOUNTER — ANCILLARY PROCEDURE (OUTPATIENT)
Dept: ULTRASOUND IMAGING | Facility: CLINIC | Age: 74
End: 2024-05-17
Attending: NURSE PRACTITIONER
Payer: MEDICARE

## 2024-05-17 ENCOUNTER — ANCILLARY PROCEDURE (OUTPATIENT)
Dept: CT IMAGING | Facility: CLINIC | Age: 74
End: 2024-05-17
Attending: NURSE PRACTITIONER
Payer: MEDICARE

## 2024-05-17 DIAGNOSIS — N18.6 ESRD (END STAGE RENAL DISEASE) (H): ICD-10-CM

## 2024-05-17 DIAGNOSIS — Z76.82 ORGAN TRANSPLANT CANDIDATE: ICD-10-CM

## 2024-05-17 DIAGNOSIS — I10 HYPERTENSION: ICD-10-CM

## 2024-05-17 PROCEDURE — 74176 CT ABD & PELVIS W/O CONTRAST: CPT | Mod: MG | Performed by: RADIOLOGY

## 2024-05-17 PROCEDURE — 93978 VASCULAR STUDY: CPT | Mod: GC | Performed by: STUDENT IN AN ORGANIZED HEALTH CARE EDUCATION/TRAINING PROGRAM

## 2024-05-17 PROCEDURE — G1010 CDSM STANSON: HCPCS | Performed by: RADIOLOGY

## 2024-05-20 DIAGNOSIS — E78.00 HYPERCHOLESTEROLEMIA: ICD-10-CM

## 2024-05-20 RX ORDER — ATORVASTATIN CALCIUM 20 MG/1
20 TABLET, FILM COATED ORAL EVERY EVENING
Qty: 90 TABLET | Refills: 0 | Status: SHIPPED | OUTPATIENT
Start: 2024-05-20 | End: 2024-08-19

## 2024-05-21 ENCOUNTER — TEAM CONFERENCE (OUTPATIENT)
Dept: TRANSPLANT | Facility: CLINIC | Age: 74
End: 2024-05-21
Payer: MEDICARE

## 2024-05-21 NOTE — TELEPHONE ENCOUNTER
Image Review Meeting    ATTENDEES: Dr. Major and coordinators     IMAGES REVIEWED: 5/17/24 CT a/p and iliac US    DECISION: vessels suitable for transplant, right better then the left based on CT images. Should have repeat imaging in 1 year if not transplanted by then     INCIDENTALS: No

## 2024-05-22 ENCOUNTER — TELEPHONE (OUTPATIENT)
Dept: TRANSPLANT | Facility: CLINIC | Age: 74
End: 2024-05-22
Payer: MEDICARE

## 2024-05-22 ENCOUNTER — MEDICAL CORRESPONDENCE (OUTPATIENT)
Dept: HEALTH INFORMATION MANAGEMENT | Facility: CLINIC | Age: 74
End: 2024-05-22
Payer: MEDICARE

## 2024-05-22 DIAGNOSIS — E78.00 HYPERCHOLESTEROLEMIA: ICD-10-CM

## 2024-05-22 DIAGNOSIS — F41.1 GAD (GENERALIZED ANXIETY DISORDER): ICD-10-CM

## 2024-05-22 RX ORDER — ATORVASTATIN CALCIUM 20 MG/1
20 TABLET, FILM COATED ORAL EVERY EVENING
Qty: 90 TABLET | Refills: 0 | OUTPATIENT
Start: 2024-05-22

## 2024-05-22 RX ORDER — SERTRALINE HYDROCHLORIDE 25 MG/1
TABLET, FILM COATED ORAL
Qty: 90 TABLET | Refills: 3 | Status: SHIPPED | OUTPATIENT
Start: 2024-05-22

## 2024-05-22 RX ORDER — SERTRALINE HYDROCHLORIDE 25 MG/1
TABLET, FILM COATED ORAL
Qty: 90 TABLET | Refills: 1 | OUTPATIENT
Start: 2024-05-22

## 2024-05-22 NOTE — TELEPHONE ENCOUNTER
Called pts daughter to update imaging has been reviewed and vessels are suitable. They have not arranged the therapy appt as they were waiting to hear back on the scans, Caroline will discuss the decision with pt. Would like check in around July to see if she feels more motivated to pursue transplant. Pt verbalized understanding of information and has no further questions. Encouraged to reach out if questions arise.

## 2024-06-03 ENCOUNTER — OFFICE VISIT (OUTPATIENT)
Dept: FAMILY MEDICINE | Facility: CLINIC | Age: 74
End: 2024-06-03
Payer: MEDICARE

## 2024-06-03 ENCOUNTER — TELEPHONE (OUTPATIENT)
Dept: FAMILY MEDICINE | Facility: CLINIC | Age: 74
End: 2024-06-03

## 2024-06-03 VITALS
HEART RATE: 60 BPM | HEIGHT: 66 IN | OXYGEN SATURATION: 97 % | TEMPERATURE: 97.9 F | DIASTOLIC BLOOD PRESSURE: 54 MMHG | SYSTOLIC BLOOD PRESSURE: 136 MMHG | WEIGHT: 222.88 LBS | BODY MASS INDEX: 35.82 KG/M2 | RESPIRATION RATE: 15 BRPM

## 2024-06-03 DIAGNOSIS — L03.115 CELLULITIS OF RIGHT LOWER EXTREMITY: Primary | ICD-10-CM

## 2024-06-03 DIAGNOSIS — F33.1 MODERATE RECURRENT MAJOR DEPRESSION (H): ICD-10-CM

## 2024-06-03 DIAGNOSIS — E66.812 CLASS 2 SEVERE OBESITY DUE TO EXCESS CALORIES WITH SERIOUS COMORBIDITY AND BODY MASS INDEX (BMI) OF 35.0 TO 35.9 IN ADULT (H): ICD-10-CM

## 2024-06-03 DIAGNOSIS — E11.628 DIABETIC FOOT INFECTION (H): ICD-10-CM

## 2024-06-03 DIAGNOSIS — Z99.2 ESRD (END STAGE RENAL DISEASE) ON DIALYSIS (H): ICD-10-CM

## 2024-06-03 DIAGNOSIS — N18.6 ESRD (END STAGE RENAL DISEASE) ON DIALYSIS (H): ICD-10-CM

## 2024-06-03 DIAGNOSIS — Z79.4 TYPE 2 DIABETES MELLITUS WITH DIABETIC NEUROPATHY, WITH LONG-TERM CURRENT USE OF INSULIN (H): ICD-10-CM

## 2024-06-03 DIAGNOSIS — E66.01 CLASS 2 SEVERE OBESITY DUE TO EXCESS CALORIES WITH SERIOUS COMORBIDITY AND BODY MASS INDEX (BMI) OF 35.0 TO 35.9 IN ADULT (H): ICD-10-CM

## 2024-06-03 DIAGNOSIS — I50.22 CHRONIC SYSTOLIC CONGESTIVE HEART FAILURE (H): ICD-10-CM

## 2024-06-03 DIAGNOSIS — L08.9 DIABETIC FOOT INFECTION (H): ICD-10-CM

## 2024-06-03 DIAGNOSIS — Z78.9 IMPAIRED MOBILITY AND ACTIVITIES OF DAILY LIVING: ICD-10-CM

## 2024-06-03 DIAGNOSIS — E11.40 TYPE 2 DIABETES MELLITUS WITH DIABETIC NEUROPATHY, WITH LONG-TERM CURRENT USE OF INSULIN (H): ICD-10-CM

## 2024-06-03 DIAGNOSIS — Z74.09 IMPAIRED MOBILITY AND ACTIVITIES OF DAILY LIVING: ICD-10-CM

## 2024-06-03 LAB
ERYTHROCYTE [DISTWIDTH] IN BLOOD BY AUTOMATED COUNT: 13.7 % (ref 10–15)
ERYTHROCYTE [SEDIMENTATION RATE] IN BLOOD BY WESTERGREN METHOD: 101 MM/HR (ref 0–30)
HCT VFR BLD AUTO: 31.9 % (ref 35–47)
HGB BLD-MCNC: 9.9 G/DL (ref 11.7–15.7)
MCH RBC QN AUTO: 31.6 PG (ref 26.5–33)
MCHC RBC AUTO-ENTMCNC: 31 G/DL (ref 31.5–36.5)
MCV RBC AUTO: 102 FL (ref 78–100)
PLATELET # BLD AUTO: 176 10E3/UL (ref 150–450)
RBC # BLD AUTO: 3.13 10E6/UL (ref 3.8–5.2)
WBC # BLD AUTO: 6.4 10E3/UL (ref 4–11)

## 2024-06-03 PROCEDURE — 87077 CULTURE AEROBIC IDENTIFY: CPT | Performed by: NURSE PRACTITIONER

## 2024-06-03 PROCEDURE — 87205 SMEAR GRAM STAIN: CPT | Performed by: NURSE PRACTITIONER

## 2024-06-03 PROCEDURE — 85027 COMPLETE CBC AUTOMATED: CPT | Performed by: NURSE PRACTITIONER

## 2024-06-03 PROCEDURE — 87186 SC STD MICRODIL/AGAR DIL: CPT | Performed by: NURSE PRACTITIONER

## 2024-06-03 PROCEDURE — 99214 OFFICE O/P EST MOD 30 MIN: CPT | Performed by: NURSE PRACTITIONER

## 2024-06-03 PROCEDURE — 80053 COMPREHEN METABOLIC PANEL: CPT | Performed by: NURSE PRACTITIONER

## 2024-06-03 PROCEDURE — 85652 RBC SED RATE AUTOMATED: CPT | Performed by: NURSE PRACTITIONER

## 2024-06-03 PROCEDURE — 87070 CULTURE OTHR SPECIMN AEROBIC: CPT | Performed by: NURSE PRACTITIONER

## 2024-06-03 PROCEDURE — 36415 COLL VENOUS BLD VENIPUNCTURE: CPT | Performed by: NURSE PRACTITIONER

## 2024-06-03 PROCEDURE — 86140 C-REACTIVE PROTEIN: CPT | Performed by: NURSE PRACTITIONER

## 2024-06-03 RX ORDER — CLINDAMYCIN HCL 300 MG
300 CAPSULE ORAL 4 TIMES DAILY
Qty: 56 CAPSULE | Refills: 0 | Status: SHIPPED | OUTPATIENT
Start: 2024-06-03 | End: 2024-09-11

## 2024-06-03 RX ORDER — SULFAMETHOXAZOLE/TRIMETHOPRIM 800-160 MG
1 TABLET ORAL DAILY
Qty: 14 TABLET | Refills: 0 | Status: SHIPPED | OUTPATIENT
Start: 2024-06-03 | End: 2024-06-03

## 2024-06-03 RX ORDER — SULFAMETHOXAZOLE/TRIMETHOPRIM 800-160 MG
1 TABLET ORAL DAILY
Qty: 14 TABLET | Refills: 0 | Status: SHIPPED | OUTPATIENT
Start: 2024-06-03

## 2024-06-03 RX ORDER — CLINDAMYCIN HCL 300 MG
300 CAPSULE ORAL 4 TIMES DAILY
Qty: 56 CAPSULE | Refills: 0 | Status: SHIPPED | OUTPATIENT
Start: 2024-06-03 | End: 2024-06-03

## 2024-06-03 ASSESSMENT — PATIENT HEALTH QUESTIONNAIRE - PHQ9
SUM OF ALL RESPONSES TO PHQ QUESTIONS 1-9: 9
10. IF YOU CHECKED OFF ANY PROBLEMS, HOW DIFFICULT HAVE THESE PROBLEMS MADE IT FOR YOU TO DO YOUR WORK, TAKE CARE OF THINGS AT HOME, OR GET ALONG WITH OTHER PEOPLE: SOMEWHAT DIFFICULT
SUM OF ALL RESPONSES TO PHQ QUESTIONS 1-9: 9

## 2024-06-03 ASSESSMENT — PAIN SCALES - GENERAL: PAINLEVEL: MODERATE PAIN (5)

## 2024-06-03 NOTE — PROGRESS NOTES
"  Assessment & Plan   Problem List Items Addressed This Visit          Nervous and Auditory    Type 2 diabetes mellitus with diabetic neuropathy, with long-term current use of insulin (H)    Relevant Medications    Continuous Glucose Sensor (FREESTYLE PHOENIX 2 SENSOR) [facility]       Digestive    Class 2 severe obesity due to excess calories with serious comorbidity in adult (H)    Relevant Orders    Physical Therapy  Referral       Endocrine    Diabetic foot infection (H)       Circulatory    CHF (congestive heart failure) (H)       Urinary    ESRD (end stage renal disease) on dialysis (H)    Relevant Orders    Physical Therapy  Referral       Infectious/Inflammatory    Cellulitis - Primary    Relevant Medications    Continuous Glucose Sensor (FREESTYLE PHOENIX 2 SENSOR) [facility]    clindamycin (CLEOCIN) 300 MG capsule    sulfamethoxazole-trimethoprim (BACTRIM DS) 800-160 MG tablet    Other Relevant Orders    CBC with platelets (Completed)    Comprehensive metabolic panel (BMP + Alb, Alk Phos, ALT, AST, Total. Bili, TP)    ESR: Erythrocyte sedimentation rate (Completed)    CRP, inflammation    Wound Aerobic Bacterial Culture Routine With Gram Stain       Behavioral    Moderate recurrent major depression (H)     Other Visit Diagnoses       Impaired mobility and activities of daily living        Relevant Orders    Physical Therapy  Referral         She appears overall well today. I will check CRP and ESR to rule out deeper infection; I don't believe she needs an xray today as the wound appears superficial.  Discussed indications to go to the ER- if symptoms are worsening after 24 hours on antibiotics; if she develops systemic symptoms such as fever, chills, nausea, vomiting, severe fatigue       BMI  Estimated body mass index is 35.97 kg/m  as calculated from the following:    Height as of this encounter: 1.676 m (5' 6\").    Weight as of this encounter: 101.1 kg (222 lb 14.2 oz).         Subjective " "  Supriya is a 74 year old, presenting for the following health issues:  Laceration (Possible cellulitis) and Hand Pain      6/3/2024     3:49 PM   Additional Questions   Roomed by Carie Snáchez   Accompanied by daughter Caroline     Laceration    Hand Pain    History of Present Illness       Reason for visit:  Celulitus    She eats 0-1 servings of fruits and vegetables daily.She consumes 0 sweetened beverage(s) daily.She exercises with enough effort to increase her heart rate 9 or less minutes per day.  She exercises with enough effort to increase her heart rate 3 or less days per week.   She is taking medications regularly.     Supriya \"alvaked\" her lower leg three days ago. She had a slight amount of bleeding at the time. She has noticed some increased pain over the past few days. She feels that her ankle is slightly more swollen than typically. She has been keeping the area covered with a bandage; noticed some yellow-nilda drainage today. She has not noticed any fever or chills. She has some ongoing increased fatigue, not sure if it has been since she injured her leg or not.        Review of Systems  CONSTITUTIONAL: NEGATIVE for fever, chills, change in weight  ENT/MOUTH: NEGATIVE for ear, mouth and throat problems  RESP: NEGATIVE for significant cough or SOB  CV: NEGATIVE for chest pain, palpitations or peripheral edema  GI: NEGATIVE for nausea, abdominal pain, heartburn, or change in bowel habits  : NEGATIVE for frequency, dysuria, or hematuria  NEURO: NEGATIVE for weakness, dizziness or paresthesias  ENDOCRINE: NEGATIVE for temperature intolerance, skin/hair changes  HEME: NEGATIVE for bleeding problems      Objective    Pulse 60   Temp 97.9  F (36.6  C) (Temporal)   Resp 15   Ht 1.676 m (5' 6\")   Wt 101.1 kg (222 lb 14.2 oz)   SpO2 97%   BMI 35.97 kg/m    Body mass index is 35.97 kg/m .  Physical Exam   GENERAL: alert and no distress  EYES: Eyes grossly normal to inspection, PERRL and conjunctivae and sclerae " normal  NECK: no adenopathy, no asymmetry, masses, or scars  RESP: lungs clear to auscultation - no rales, rhonchi or wheezes  CV: regular rate and rhythm, normal S1 S2, no S3 or S4, no murmur, click or rub, no peripheral edema  ABDOMEN: soft, nontender, no hepatosplenomegaly, no masses and bowel sounds normal  SKIN: right lower le-3 cm abrasion; mild tenderness to touch with mild erythema. Trace swelling present. Trace amount purulent drainage present  PSYCH: mentation appears normal, affect normal/bright    Results for orders placed or performed in visit on 24   CBC with platelets     Status: Abnormal   Result Value Ref Range    WBC Count 6.4 4.0 - 11.0 10e3/uL    RBC Count 3.13 (L) 3.80 - 5.20 10e6/uL    Hemoglobin 9.9 (L) 11.7 - 15.7 g/dL    Hematocrit 31.9 (L) 35.0 - 47.0 %     (H) 78 - 100 fL    MCH 31.6 26.5 - 33.0 pg    MCHC 31.0 (L) 31.5 - 36.5 g/dL    RDW 13.7 10.0 - 15.0 %    Platelet Count 176 150 - 450 10e3/uL   ESR: Erythrocyte sedimentation rate     Status: Abnormal   Result Value Ref Range    Erythrocyte Sedimentation Rate 101 (H) 0 - 30 mm/hr     Results for orders placed or performed in visit on 24 (from the past 24 hour(s))   CBC with platelets   Result Value Ref Range    WBC Count 6.4 4.0 - 11.0 10e3/uL    RBC Count 3.13 (L) 3.80 - 5.20 10e6/uL    Hemoglobin 9.9 (L) 11.7 - 15.7 g/dL    Hematocrit 31.9 (L) 35.0 - 47.0 %     (H) 78 - 100 fL    MCH 31.6 26.5 - 33.0 pg    MCHC 31.0 (L) 31.5 - 36.5 g/dL    RDW 13.7 10.0 - 15.0 %    Platelet Count 176 150 - 450 10e3/uL   ESR: Erythrocyte sedimentation rate   Result Value Ref Range    Erythrocyte Sedimentation Rate 101 (H) 0 - 30 mm/hr     *Note: Due to a large number of results and/or encounters for the requested time period, some results have not been displayed. A complete set of results can be found in Results Review.           Signed Electronically by: PENELOPE Valera CNP

## 2024-06-03 NOTE — TELEPHONE ENCOUNTER
Daughter calling to schedule an appt for suspected cellulitis on right shin.   Pt hit leg on wheelchair Friday and wound bandage is moist and daughter is concerned about cellulitis - hx if hospitalization due to cellulitis.   No fever, chills, blood on wound    Scheduled at 3:40 today with LARISA VELEZ RN  [facility]

## 2024-06-04 ENCOUNTER — MYC MEDICAL ADVICE (OUTPATIENT)
Dept: FAMILY MEDICINE | Facility: CLINIC | Age: 74
End: 2024-06-04
Payer: MEDICARE

## 2024-06-04 ENCOUNTER — MYC MEDICAL ADVICE (OUTPATIENT)
Dept: DERMATOLOGY | Facility: CLINIC | Age: 74
End: 2024-06-04
Payer: MEDICARE

## 2024-06-04 ENCOUNTER — TELEPHONE (OUTPATIENT)
Dept: FAMILY MEDICINE | Facility: CLINIC | Age: 74
End: 2024-06-04
Payer: MEDICARE

## 2024-06-04 DIAGNOSIS — L03.115 CELLULITIS OF RIGHT LOWER EXTREMITY: Primary | ICD-10-CM

## 2024-06-04 LAB
ALBUMIN SERPL BCG-MCNC: 3.8 G/DL (ref 3.5–5.2)
ALP SERPL-CCNC: 58 U/L (ref 40–150)
ALT SERPL W P-5'-P-CCNC: 11 U/L (ref 0–50)
ANION GAP SERPL CALCULATED.3IONS-SCNC: 14 MMOL/L (ref 7–15)
AST SERPL W P-5'-P-CCNC: 15 U/L (ref 0–45)
BILIRUB SERPL-MCNC: 0.2 MG/DL
BUN SERPL-MCNC: 55.8 MG/DL (ref 8–23)
CALCIUM SERPL-MCNC: 10.8 MG/DL (ref 8.8–10.2)
CHLORIDE SERPL-SCNC: 104 MMOL/L (ref 98–107)
CREAT SERPL-MCNC: 7 MG/DL (ref 0.51–0.95)
CRP SERPL-MCNC: 20.8 MG/L
DEPRECATED HCO3 PLAS-SCNC: 25 MMOL/L (ref 22–29)
EGFRCR SERPLBLD CKD-EPI 2021: 6 ML/MIN/1.73M2
GLUCOSE SERPL-MCNC: 159 MG/DL (ref 70–99)
POTASSIUM SERPL-SCNC: 5.1 MMOL/L (ref 3.4–5.3)
PROT SERPL-MCNC: 7 G/DL (ref 6.4–8.3)
SODIUM SERPL-SCNC: 143 MMOL/L (ref 135–145)

## 2024-06-04 NOTE — TELEPHONE ENCOUNTER
I think it's okay to just continue with the medications right now. Signs to go to the ER would be fever, chills, pain at wound is worsening, leg is looking more red/swollen.

## 2024-06-04 NOTE — TELEPHONE ENCOUNTER
We don't have everything back yet, but so far the results are not yet indicating that she needs to go to ER. Is she feeling any better? Any improvement with pain around her wound?

## 2024-06-04 NOTE — TELEPHONE ENCOUNTER
Daughter calling, C2C, on file requesting interpretation of labs from yesterday as she saw them lastnight and is wondering with potential cellulitis and abnormal level of labs if pt needs to go to the hospital.    Please advise,    Thanks!  Blaise Hernandez RN   [facility]

## 2024-06-05 DIAGNOSIS — E55.9 VITAMIN D DEFICIENCY: ICD-10-CM

## 2024-06-05 RX ORDER — CHOLECALCIFEROL (VITAMIN D3) 50 MCG
1 TABLET ORAL DAILY
Qty: 90 TABLET | Refills: 2 | Status: SHIPPED | OUTPATIENT
Start: 2024-06-05

## 2024-06-06 DIAGNOSIS — L03.115 CELLULITIS OF RIGHT LOWER EXTREMITY: ICD-10-CM

## 2024-06-06 DIAGNOSIS — E11.40 TYPE 2 DIABETES MELLITUS WITH DIABETIC NEUROPATHY, WITH LONG-TERM CURRENT USE OF INSULIN (H): ICD-10-CM

## 2024-06-06 DIAGNOSIS — Z79.4 TYPE 2 DIABETES MELLITUS WITH DIABETIC NEUROPATHY, WITH LONG-TERM CURRENT USE OF INSULIN (H): ICD-10-CM

## 2024-06-06 LAB
BACTERIA WND CULT: ABNORMAL
GRAM STAIN RESULT: ABNORMAL

## 2024-06-06 NOTE — TELEPHONE ENCOUNTER
Called and spoke with pt's daughter and relayed message. No questions at this time. Thanks    Dionne Lei RN  [facility]

## 2024-06-06 NOTE — TELEPHONE ENCOUNTER
Where does she want orders sent? I looked at pictures- wound is slightly smaller and I think she is on the right track. As long as she is overall feeling well, eating and drinking, she can continue with current therapy.

## 2024-06-06 NOTE — TELEPHONE ENCOUNTER
Per pt goyo pended wound care DME supplies, if you agree.    Thanks!  Blaise Hernandez RN   [facility]

## 2024-06-14 ENCOUNTER — OFFICE VISIT (OUTPATIENT)
Dept: FAMILY MEDICINE | Facility: CLINIC | Age: 74
End: 2024-06-14
Payer: MEDICARE

## 2024-06-14 VITALS
WEIGHT: 222.88 LBS | OXYGEN SATURATION: 97 % | HEIGHT: 66 IN | TEMPERATURE: 98.5 F | HEART RATE: 61 BPM | BODY MASS INDEX: 35.82 KG/M2 | RESPIRATION RATE: 15 BRPM

## 2024-06-14 DIAGNOSIS — N18.6 ESRD (END STAGE RENAL DISEASE) ON DIALYSIS (H): ICD-10-CM

## 2024-06-14 DIAGNOSIS — Z99.2 ESRD (END STAGE RENAL DISEASE) ON DIALYSIS (H): ICD-10-CM

## 2024-06-14 DIAGNOSIS — E11.628 DIABETIC FOOT INFECTION (H): ICD-10-CM

## 2024-06-14 DIAGNOSIS — L03.115 CELLULITIS OF RIGHT LOWER EXTREMITY: Primary | ICD-10-CM

## 2024-06-14 DIAGNOSIS — L08.9 DIABETIC FOOT INFECTION (H): ICD-10-CM

## 2024-06-14 PROCEDURE — 99214 OFFICE O/P EST MOD 30 MIN: CPT | Performed by: FAMILY MEDICINE

## 2024-06-14 ASSESSMENT — PAIN SCALES - GENERAL: PAINLEVEL: NO PAIN (0)

## 2024-06-16 ENCOUNTER — HEALTH MAINTENANCE LETTER (OUTPATIENT)
Age: 74
End: 2024-06-16

## 2024-06-18 DIAGNOSIS — K21.9 GASTROESOPHAGEAL REFLUX DISEASE WITHOUT ESOPHAGITIS: Primary | ICD-10-CM

## 2024-06-18 RX ORDER — PANTOPRAZOLE SODIUM 40 MG/1
40 TABLET, DELAYED RELEASE ORAL DAILY
Qty: 90 TABLET | Refills: 3 | Status: SHIPPED | OUTPATIENT
Start: 2024-06-18

## 2024-06-26 ENCOUNTER — THERAPY VISIT (OUTPATIENT)
Dept: OCCUPATIONAL THERAPY | Facility: CLINIC | Age: 74
End: 2024-06-26
Attending: NURSE PRACTITIONER
Payer: MEDICARE

## 2024-06-26 DIAGNOSIS — Z99.2 ESRD (END STAGE RENAL DISEASE) ON DIALYSIS (H): ICD-10-CM

## 2024-06-26 DIAGNOSIS — N18.6 ESRD (END STAGE RENAL DISEASE) ON DIALYSIS (H): ICD-10-CM

## 2024-06-26 DIAGNOSIS — Z74.09 IMPAIRED MOBILITY AND ACTIVITIES OF DAILY LIVING: ICD-10-CM

## 2024-06-26 DIAGNOSIS — Z74.09 IMPAIRED MOBILITY AND ADLS: Primary | ICD-10-CM

## 2024-06-26 DIAGNOSIS — Z78.9 IMPAIRED MOBILITY AND ADLS: Primary | ICD-10-CM

## 2024-06-26 DIAGNOSIS — Z78.9 IMPAIRED MOBILITY AND ACTIVITIES OF DAILY LIVING: ICD-10-CM

## 2024-06-26 DIAGNOSIS — E66.812 CLASS 2 SEVERE OBESITY DUE TO EXCESS CALORIES WITH SERIOUS COMORBIDITY AND BODY MASS INDEX (BMI) OF 35.0 TO 35.9 IN ADULT (H): ICD-10-CM

## 2024-06-26 DIAGNOSIS — E66.01 CLASS 2 SEVERE OBESITY DUE TO EXCESS CALORIES WITH SERIOUS COMORBIDITY AND BODY MASS INDEX (BMI) OF 35.0 TO 35.9 IN ADULT (H): ICD-10-CM

## 2024-06-26 PROCEDURE — 97542 WHEELCHAIR MNGMENT TRAINING: CPT | Mod: GO | Performed by: OCCUPATIONAL THERAPIST

## 2024-06-26 NOTE — PROGRESS NOTES
"  SEATING AND WHEELED MOBILITY ASSESSMENT   Quick Adds   Quick Adds Certification;Current Manual Wheelchair   General Information    Rehab Discipline OT   Funding Medicare/[employer]   Service Outpatient;Occupational Therapy;Seating/Wheeled Mobility Evaluation   Height 5'6\"   Weight 22   Start Of Care Date 6/28/23   Referring Physician Kaylee Patel    Orders Evaluate And Treat As Indicated;Per Therapist Evaluation   Orders Date 6/26/24   Others Present at Evaluation Daughter   Patient/Caregiver Goals Pressure Relief/wc replacement   Rehabilitation Technology Supplier Usha PRIEST from [facility]   Current Community Support Family/Friend Caregiver;Therapy Services  (24 hour care)   Patient role/Employment history Disabled   Fall Risk Screen   Fall screen completed by OT   Have you fallen 2 or more times in the past year? no   Have you fallen and had an injury in the past year? no   Is patient a fall risk? Yes;Department fall risk interventions implemented   Medical History   Onset Of Illness/injury Or Date Of Surgery 6/26/26  (order)   Medical Diagnosis L AKA- no longer wearing prosthetic   Medical History DMII, R Tib fracture, CKD, CHF, Dialysis,    Current Manual Wheelchair   Age [birth year]    [employer] 2   Cushion roho   Wheelchair Back Upholstered   Footrest Style swing away not present   Settings Used Home;Outdoor Community Mobility;Primary Means of Transportation   Condition poor   Current Equipment Requires replacement   Home Accessibility   Living Environment House   Primary Entrance Exposed Ramp   Community ADL   Transportation Car   Community Mobility Requirements Medical Appointments;Shopping   Cognitive/Visual/Hearing Status   Vision Corrective Lenses   ADL Status   Feeding Independent   Grooming/Hygiene Independent   Dressing Independent   Toileting Independent;Uses Equipment  (RTS)   Bathing Requires Assist;Uses Equipment  (Shower chair)   Meal Preparation Requires Assist   Home Management " Requires Assist       Balance   Unsupported Sitting Balance Uses Upper Extremities for Balance   Sitting Balance in Chair Uses Upper Extremities for Balance   Standing Balance Unable   Ambulation   Ambulation Non Ambulatory   Transfers   Transfer Assist Independent;Moderate Assist slide/scoot transfers   Neuromuscular   History of Pressure Sores Yes   Pain Yes   Pain Location L stump /toes- phantom paon   Sensory Deficits Reported Bilateral neuropathies in hands   Education Assessment   Barriers to Learning Physical;Emotional;Cognitive   Preferred Learning Style Listening;Demonstration   Assessment/Plan   Criteria for Skilled Interventions Met Yes, Treatment Indicated   Treatment Diagnosis impaired participation in MRADLS   Planned Therapy Interventions Wheelchair Management/Propulsion Training   Planned Therapy Interventions Comments Determined need for replacement k5 wheelchair.  Pressure mapping completed with L IT at 200mmhg.  Trials of new jeannie with modifications to camber and tab length.  Educated on glide wear.   Risks and benefits of treatment have been explained Yes   Patient/family & other staff in agreement with plan of care Yes   Comments Educated on power wheelchair options due to hand pain   Session Time   OT Wheelchair Management Minutes (08724) 60   Adult OT Eval Goals   OT Eval Goals (Adult) 1    OT Goal 1   Goal Identifier wheelchair   Goal Description Patient to demonstrate understanding of rec. And participate in home trial to determine specs.    Goal Progress To be completed   Target Date 6/26/24   Date Met 6/26/24     Electronically signed by:  Brenna MACHADO/MALAIKA, ATP      Occupational Therapist, Assistive   [phone]      fax: [phone]      [email or web address]  [facility], [address]  [address]  [place] [address]

## 2024-07-12 ENCOUNTER — TELEPHONE (OUTPATIENT)
Dept: TRANSPLANT | Facility: CLINIC | Age: 74
End: 2024-07-12

## 2024-07-12 ENCOUNTER — OFFICE VISIT (OUTPATIENT)
Dept: DERMATOLOGY | Facility: CLINIC | Age: 74
End: 2024-07-12
Payer: MEDICARE

## 2024-07-12 DIAGNOSIS — L82.1 SEBORRHEIC KERATOSES: ICD-10-CM

## 2024-07-12 DIAGNOSIS — Z12.83 ENCOUNTER FOR SCREENING FOR MALIGNANT NEOPLASM OF SKIN: ICD-10-CM

## 2024-07-12 DIAGNOSIS — L40.8 INVERSE PSORIASIS: ICD-10-CM

## 2024-07-12 DIAGNOSIS — D18.01 CHERRY ANGIOMA: ICD-10-CM

## 2024-07-12 DIAGNOSIS — L81.4 LENTIGINES: ICD-10-CM

## 2024-07-12 DIAGNOSIS — D22.9 MULTIPLE NEVI: Primary | ICD-10-CM

## 2024-07-12 PROCEDURE — 99213 OFFICE O/P EST LOW 20 MIN: CPT | Performed by: PHYSICIAN ASSISTANT

## 2024-07-12 ASSESSMENT — PAIN SCALES - GENERAL: PAINLEVEL: NO PAIN (0)
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
## 2024-07-12 NOTE — PATIENT INSTRUCTIONS
Psoriasis Action Plan    Name: Supriya SIMPSON Carmenza Provider: KUN HILLIARD Date: 7/12/2024    Step 1:  Skin Flare    Continue below procedures  Also, use prescribed steroid two times a day for up to two weeks per month. Apply to red and itchy areas. Put the steroid on the skin first before other creams.   Body: triamcinolone 0.1% ointment twice daily for 2 weeks as needed     Step 2: Skin Under Control (Skin is soft and flexible, not red or itchy)  For maintenance: tacrolimus 0.1% ointment twice daily on weekdays, triamcinolone 0.1% ointment twice daily on weekends  Miconazole powder to skin folds     Watch for signs that the skin is turning red, itchy, or dry.    Use a gentle cleanser/soap in the bath/shower such as dove sensitive cleanser or Cetaphil cleanser only to the armpits and groin areas, or where you are visibly dirty. All other areas should get washed with water only. Do not scrub. After bathing pat the skin dry with a towel instead of rubbing dry. Apply your moisturizer while you're still slightly damp to lock in some of the moisture.

## 2024-07-12 NOTE — NURSING NOTE
Dermatology Rooming Note    Supriya Herr's goals for this visit include:   Chief Complaint   Patient presents with    Skin Check     Supriya is here today for a skin check and a psoriasis follow up      [facility] Dillon - Dermatology

## 2024-07-16 ENCOUNTER — TELEPHONE (OUTPATIENT)
Dept: FAMILY MEDICINE | Facility: CLINIC | Age: 74
End: 2024-07-16
Payer: MEDICARE

## 2024-07-16 DIAGNOSIS — E11.40 TYPE 2 DIABETES MELLITUS WITH DIABETIC NEUROPATHY, WITH LONG-TERM CURRENT USE OF INSULIN (H): Primary | ICD-10-CM

## 2024-07-16 DIAGNOSIS — Z79.4 TYPE 2 DIABETES MELLITUS WITH DIABETIC NEUROPATHY, WITH LONG-TERM CURRENT USE OF INSULIN (H): Primary | ICD-10-CM

## 2024-07-16 NOTE — TELEPHONE ENCOUNTER
This message is to inform you that we are in need of Medicare compliant prescriptions for Freestyle Avery 2. The prescription sent on 6/3/24 was canceled and we need a new prescription sent over so patient can get a refill.    Prescription must be written by: Kaylee Patel.  Must include full supply name: Freestyle Avery 2 Sensor  Quantity: 6  Refills: 5  SIG: Change every 14 days    As a reminder, Medicare requires patients to be seen every 3 months for insulin supplies and every 6 months for CGMs. The provider who sees the patient must be the provider on the prescription, must be written on the day of or after office visit date and office visit must include notes about diabetes and insulin regimen. The Diabetes Care Services team at [facility] may request new prescriptions before renewal dates of the prescription is filled over the allowable amount. Writing the order to match what is above will help ensure we will only need to request every 3 or 6 months.    Thank you!    [facility]  Diabetes Care Services Team  [address] [place] [address]  Provider Phone: [phone]  Patient Line: [phone]  Fax: [phone]  E-mail: [email or web address]

## 2024-07-17 DIAGNOSIS — E11.40 TYPE 2 DIABETES MELLITUS WITH DIABETIC NEUROPATHY, WITH LONG-TERM CURRENT USE OF INSULIN (H): Primary | ICD-10-CM

## 2024-07-17 DIAGNOSIS — Z79.4 TYPE 2 DIABETES MELLITUS WITH DIABETIC NEUROPATHY, WITH LONG-TERM CURRENT USE OF INSULIN (H): Primary | ICD-10-CM

## 2024-07-17 DIAGNOSIS — L03.115 CELLULITIS OF RIGHT LOWER EXTREMITY: ICD-10-CM

## 2024-07-17 DIAGNOSIS — E11.40 TYPE 2 DIABETES MELLITUS WITH DIABETIC NEUROPATHY, WITH LONG-TERM CURRENT USE OF INSULIN (H): ICD-10-CM

## 2024-07-17 DIAGNOSIS — Z79.4 TYPE 2 DIABETES MELLITUS WITH DIABETIC NEUROPATHY, WITH LONG-TERM CURRENT USE OF INSULIN (H): ICD-10-CM

## 2024-07-17 NOTE — TELEPHONE ENCOUNTER
If you could refill her freestyle sara 2  sensor this one time she will be back to [facility] in August . Per medicare regulation you're the only provider that can order it .Kerri Jimenez RN on 7/17/2024 at 6:15 PM

## 2024-07-19 ENCOUNTER — OFFICE VISIT (OUTPATIENT)
Dept: FAMILY MEDICINE | Facility: CLINIC | Age: 74
End: 2024-07-19
Payer: MEDICARE

## 2024-07-19 DIAGNOSIS — Z99.3 WHEELCHAIR DEPENDENCE: ICD-10-CM

## 2024-07-19 DIAGNOSIS — S78.112D: ICD-10-CM

## 2024-07-19 DIAGNOSIS — Z99.2 ESRD (END STAGE RENAL DISEASE) ON DIALYSIS (H): ICD-10-CM

## 2024-07-19 DIAGNOSIS — L08.9 DIABETIC FOOT INFECTION (H): ICD-10-CM

## 2024-07-19 DIAGNOSIS — F41.1 GAD (GENERALIZED ANXIETY DISORDER): ICD-10-CM

## 2024-07-19 DIAGNOSIS — N18.6 ESRD (END STAGE RENAL DISEASE) ON DIALYSIS (H): ICD-10-CM

## 2024-07-19 DIAGNOSIS — Z00.00 MEDICARE ANNUAL WELLNESS VISIT, SUBSEQUENT: Primary | ICD-10-CM

## 2024-07-19 DIAGNOSIS — E78.00 HYPERCHOLESTEROLEMIA: ICD-10-CM

## 2024-07-19 DIAGNOSIS — R60.0 EDEMA OF RIGHT LOWER LEG: ICD-10-CM

## 2024-07-19 DIAGNOSIS — E11.40 TYPE 2 DIABETES MELLITUS WITH DIABETIC NEUROPATHY, WITH LONG-TERM CURRENT USE OF INSULIN (H): ICD-10-CM

## 2024-07-19 DIAGNOSIS — Z79.4 TYPE 2 DIABETES MELLITUS WITH DIABETIC NEUROPATHY, WITH LONG-TERM CURRENT USE OF INSULIN (H): ICD-10-CM

## 2024-07-19 DIAGNOSIS — I50.22 CHRONIC SYSTOLIC CONGESTIVE HEART FAILURE (H): ICD-10-CM

## 2024-07-19 DIAGNOSIS — E11.628 DIABETIC FOOT INFECTION (H): ICD-10-CM

## 2024-07-19 PROCEDURE — 99213 OFFICE O/P EST LOW 20 MIN: CPT | Performed by: FAMILY MEDICINE

## 2024-07-19 PROCEDURE — G0439 PPPS, SUBSEQ VISIT: HCPCS | Mod: 4MD | Performed by: FAMILY MEDICINE

## 2024-07-19 SDOH — HEALTH STABILITY: PHYSICAL HEALTH: ON AVERAGE, HOW MANY DAYS PER WEEK DO YOU ENGAGE IN MODERATE TO STRENUOUS EXERCISE (LIKE A BRISK WALK)?: 1 DAY

## 2024-07-19 SDOH — HEALTH STABILITY: PHYSICAL HEALTH: ON AVERAGE, HOW MANY MINUTES DO YOU ENGAGE IN EXERCISE AT THIS LEVEL?: 10 MIN

## 2024-07-19 ASSESSMENT — PATIENT HEALTH QUESTIONNAIRE - PHQ9
10. IF YOU CHECKED OFF ANY PROBLEMS, HOW DIFFICULT HAVE THESE PROBLEMS MADE IT FOR YOU TO DO YOUR WORK, TAKE CARE OF THINGS AT HOME, OR GET ALONG WITH OTHER PEOPLE: SOMEWHAT DIFFICULT
SUM OF ALL RESPONSES TO PHQ QUESTIONS 1-9: 1
SUM OF ALL RESPONSES TO PHQ QUESTIONS 1-9: 1

## 2024-07-19 ASSESSMENT — SOCIAL DETERMINANTS OF HEALTH (SDOH): HOW OFTEN DO YOU GET TOGETHER WITH FRIENDS OR RELATIVES?: MORE THAN THREE TIMES A WEEK

## 2024-07-19 ASSESSMENT — PAIN SCALES - GENERAL: PAINLEVEL: NO PAIN (0)
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
## 2024-07-19 NOTE — LETTER
Wheelchair Documentation:   Describe the reason for need to support medical necessity: history left lower leg amputation/ generalized weakness, ESRD on dialysis.   1. The patient has mobility limitations that impairs their ability to participate in one or more mobility related activities: Toileting, Grooming, and Bathing.  2. The patient's mobility limitations cannot be safely resolved by using a cane/walker: Yes  3. The patients home has adequate access to use a manual wheelchair: Yes  4. The use of a manual wheelchair on a regular basis will improve the patients ability to participate in mobility related ADL's at home: Yes  5. The patient is willing to use a manual wheelchair at home: Yes  6. The patient has adequate upper body strength and the mental capability to safely use a manual wheelchair and/or has a caregiver that is able to assist: Yes  7. Does the patient have a lower extremity injury or edema?: Yes    Reason for Type of Wheelchair: Heavy Duty Wheelchair: Patient is over the 250lb weight limit for other wheelchairs.    **Use of a manual wheelchair will significantly improve the patient's ability to participate in MRADLs and the patient will use it on a regular basis in the home. The patient has not expressed an unwillingness to use the manual wheelchair that is provided in the home.**    I, the undersigned, certify that the above prescribed supplies are medically necessary for this patient and is both reasonable and necessary in reference to accepted standards of medical and necessary in reference to accepted standards of medical practice in the treatment of this patient's condition and is not prescribed as a convenience.

## 2024-07-22 ENCOUNTER — MYC MEDICAL ADVICE (OUTPATIENT)
Dept: FAMILY MEDICINE | Facility: CLINIC | Age: 74
End: 2024-07-22
Payer: MEDICARE

## 2024-07-22 DIAGNOSIS — Z79.4 TYPE 2 DIABETES MELLITUS WITH DIABETIC NEUROPATHY, WITH LONG-TERM CURRENT USE OF INSULIN (H): ICD-10-CM

## 2024-07-22 DIAGNOSIS — L97.512 ULCER OF RIGHT FOOT WITH FAT LAYER EXPOSED (H): Primary | ICD-10-CM

## 2024-07-22 DIAGNOSIS — E11.51 DIABETES MELLITUS WITH PERIPHERAL VASCULAR DISEASE (H): ICD-10-CM

## 2024-07-22 DIAGNOSIS — E11.40 TYPE 2 DIABETES MELLITUS WITH DIABETIC NEUROPATHY, WITH LONG-TERM CURRENT USE OF INSULIN (H): ICD-10-CM

## 2024-07-22 DIAGNOSIS — I10 ESSENTIAL HYPERTENSION: ICD-10-CM

## 2024-07-22 RX ORDER — CLINDAMYCIN HCL 300 MG
300 CAPSULE ORAL 3 TIMES DAILY
Qty: 30 CAPSULE | Refills: 0 | Status: SHIPPED | OUTPATIENT
Start: 2024-07-22

## 2024-07-22 RX ORDER — SILVER SULFADIAZINE 10 MG/G
CREAM TOPICAL 2 TIMES DAILY
Qty: 85 G | Refills: 1 | Status: SHIPPED | OUTPATIENT
Start: 2024-07-22

## 2024-07-22 RX ORDER — CARVEDILOL 25 MG/1
25 TABLET ORAL 2 TIMES DAILY WITH MEALS
Qty: 180 TABLET | Refills: 3 | Status: SHIPPED | OUTPATIENT
Start: 2024-07-22

## 2024-07-22 NOTE — TELEPHONE ENCOUNTER
REASON FOR VISIT: foot blister sores    DATE OF APPT: 7/31/2024   NOTES (FOR ALL VISITS) STATUS DETAILS   OFFICE NOTE from referring provider N/A    MEDICATION LIST Internal    IMAGING  (FOR ALL VISITS)     MRI (HEAD, NECK, SPINE) N/A    CT (HEAD, NECK, SPINE) N/A
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
## 2024-07-24 VITALS
RESPIRATION RATE: 23 BRPM | HEART RATE: 61 BPM | DIASTOLIC BLOOD PRESSURE: 60 MMHG | OXYGEN SATURATION: 98 % | SYSTOLIC BLOOD PRESSURE: 126 MMHG | TEMPERATURE: 98.3 F

## 2024-07-31 ENCOUNTER — PRE VISIT (OUTPATIENT)
Dept: PODIATRY | Facility: CLINIC | Age: 74
End: 2024-07-31

## 2024-07-31 ENCOUNTER — OFFICE VISIT (OUTPATIENT)
Dept: PODIATRY | Facility: CLINIC | Age: 74
End: 2024-07-31
Payer: MEDICARE

## 2024-07-31 DIAGNOSIS — E11.40 TYPE 2 DIABETES MELLITUS WITH DIABETIC NEUROPATHY, WITH LONG-TERM CURRENT USE OF INSULIN (H): ICD-10-CM

## 2024-07-31 DIAGNOSIS — E11.51 DIABETES MELLITUS WITH PERIPHERAL VASCULAR DISEASE (H): ICD-10-CM

## 2024-07-31 DIAGNOSIS — Z79.4 TYPE 2 DIABETES MELLITUS WITH DIABETIC NEUROPATHY, WITH LONG-TERM CURRENT USE OF INSULIN (H): ICD-10-CM

## 2024-07-31 DIAGNOSIS — L97.512 ULCER OF RIGHT FOOT WITH FAT LAYER EXPOSED (H): Primary | ICD-10-CM

## 2024-07-31 PROCEDURE — 99214 OFFICE O/P EST MOD 30 MIN: CPT | Performed by: PODIATRIST

## 2024-07-31 NOTE — NURSING NOTE
Supriya Herr's chief complaint for this visit includes:  Chief Complaint   Patient presents with    Consult     Foot wounds, Dr. Pack patient     PCP: Noam Jackson    Referring Provider:  Referred Self, MD  No address on file    LMP  (LMP Unknown)   Data Unavailable     Do you need any medication refills at today's visit? NO    Allergies   Allergen Reactions    Ampicillin-Sulbactam Sodium Rash     No evidence SJS, but very uncomfortable and precipitated multiple provider visits. Would not use penicillins again if other options available.     Penicillins Rash       Latosha Holden LPN
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
## 2024-08-06 DIAGNOSIS — K59.01 SLOW TRANSIT CONSTIPATION: Primary | ICD-10-CM

## 2024-08-13 ENCOUNTER — HOSPITAL ENCOUNTER (EMERGENCY)
Facility: CLINIC | Age: 74
Discharge: HOME | End: 2024-08-13
Attending: EMERGENCY MEDICINE | Admitting: EMERGENCY MEDICINE
Payer: MEDICARE

## 2024-08-13 VITALS
DIASTOLIC BLOOD PRESSURE: 69 MMHG | OXYGEN SATURATION: 97 % | SYSTOLIC BLOOD PRESSURE: 150 MMHG | RESPIRATION RATE: 20 BRPM | TEMPERATURE: 97.7 F | HEART RATE: 60 BPM

## 2024-08-13 DIAGNOSIS — N18.6 ESRD (END STAGE RENAL DISEASE) ON DIALYSIS (H): ICD-10-CM

## 2024-08-13 DIAGNOSIS — R55 SYNCOPE, UNSPECIFIED SYNCOPE TYPE: ICD-10-CM

## 2024-08-13 DIAGNOSIS — Z99.2 ESRD (END STAGE RENAL DISEASE) ON DIALYSIS (H): ICD-10-CM

## 2024-08-13 LAB
ALBUMIN SERPL BCG-MCNC: 3.8 G/DL (ref 3.5–5.2)
ALP SERPL-CCNC: 68 U/L (ref 40–150)
ALT SERPL W P-5'-P-CCNC: 12 U/L (ref 0–50)
ANION GAP SERPL CALCULATED.3IONS-SCNC: 8 MMOL/L (ref 7–15)
AST SERPL W P-5'-P-CCNC: 16 U/L (ref 0–45)
BASOPHILS # BLD AUTO: 0 10E3/UL (ref 0–0.2)
BASOPHILS NFR BLD AUTO: 0 %
BILIRUB SERPL-MCNC: 0.3 MG/DL
BUN SERPL-MCNC: 19.2 MG/DL (ref 8–23)
CALCIUM SERPL-MCNC: 9.5 MG/DL (ref 8.8–10.4)
CHLORIDE SERPL-SCNC: 98 MMOL/L (ref 98–107)
CREAT SERPL-MCNC: 3.19 MG/DL (ref 0.51–0.95)
EGFRCR SERPLBLD CKD-EPI 2021: 15 ML/MIN/1.73M2
EOSINOPHIL # BLD AUTO: 0.1 10E3/UL (ref 0–0.7)
EOSINOPHIL NFR BLD AUTO: 2 %
ERYTHROCYTE [DISTWIDTH] IN BLOOD BY AUTOMATED COUNT: 16.3 % (ref 10–15)
GLUCOSE SERPL-MCNC: 301 MG/DL (ref 70–99)
HCO3 SERPL-SCNC: 29 MMOL/L (ref 22–29)
HCT VFR BLD AUTO: 37.3 % (ref 35–47)
HGB BLD-MCNC: 11.6 G/DL (ref 11.7–15.7)
HOLD SPECIMEN: NORMAL
HOLD SPECIMEN: NORMAL
IMM GRANULOCYTES # BLD: 0 10E3/UL
IMM GRANULOCYTES NFR BLD: 0 %
LYMPHOCYTES # BLD AUTO: 0.8 10E3/UL (ref 0.8–5.3)
LYMPHOCYTES NFR BLD AUTO: 11 %
MCH RBC QN AUTO: 31.5 PG (ref 26.5–33)
MCHC RBC AUTO-ENTMCNC: 31.1 G/DL (ref 31.5–36.5)
MCV RBC AUTO: 101 FL (ref 78–100)
MONOCYTES # BLD AUTO: 0.5 10E3/UL (ref 0–1.3)
MONOCYTES NFR BLD AUTO: 7 %
NEUTROPHILS # BLD AUTO: 5.9 10E3/UL (ref 1.6–8.3)
NEUTROPHILS NFR BLD AUTO: 80 %
NRBC # BLD AUTO: 0 10E3/UL
NRBC BLD AUTO-RTO: 0 /100
PLATELET # BLD AUTO: 180 10E3/UL (ref 150–450)
POTASSIUM SERPL-SCNC: 3.6 MMOL/L (ref 3.4–5.3)
PROT SERPL-MCNC: 6.9 G/DL (ref 6.4–8.3)
RBC # BLD AUTO: 3.68 10E6/UL (ref 3.8–5.2)
SODIUM SERPL-SCNC: 135 MMOL/L (ref 135–145)
WBC # BLD AUTO: 7.4 10E3/UL (ref 4–11)

## 2024-08-13 PROCEDURE — 99284 EMERGENCY DEPT VISIT MOD MDM: CPT

## 2024-08-13 PROCEDURE — 36415 COLL VENOUS BLD VENIPUNCTURE: CPT | Performed by: EMERGENCY MEDICINE

## 2024-08-13 PROCEDURE — 85025 COMPLETE CBC W/AUTO DIFF WBC: CPT | Performed by: EMERGENCY MEDICINE

## 2024-08-13 PROCEDURE — 80053 COMPREHEN METABOLIC PANEL: CPT | Performed by: EMERGENCY MEDICINE

## 2024-08-13 PROCEDURE — 93005 ELECTROCARDIOGRAM TRACING: CPT

## 2024-08-13 ASSESSMENT — ACTIVITIES OF DAILY LIVING (ADL): ADLS_ACUITY_SCORE: 41
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
## 2024-08-13 NOTE — ED TRIAGE NOTES
Pt BIBA from dialysis (pt finished run) after having syncopal episode after having SOB and becoming hypotensive (systolic 87), went unresponsive. Pt had headache during dialysis.  EMS -160 systolic. Daughter is PCA. EKG normal. No falls. Has some concern for yeast infection under breast and in groin

## 2024-08-13 NOTE — ED NOTES
Bed: ED15  Expected date:   Expected time:   Means of arrival:   Comments:  [id number] Syncope@ Dialysis

## 2024-08-14 LAB
ATRIAL RATE - MUSE: 62 BPM
DIASTOLIC BLOOD PRESSURE - MUSE: NORMAL MMHG
INTERPRETATION ECG - MUSE: NORMAL
P AXIS - MUSE: 67 DEGREES
PR INTERVAL - MUSE: 200 MS
QRS DURATION - MUSE: 98 MS
QT - MUSE: 478 MS
QTC - MUSE: 485 MS
R AXIS - MUSE: 32 DEGREES
SYSTOLIC BLOOD PRESSURE - MUSE: NORMAL MMHG
T AXIS - MUSE: 43 DEGREES
VENTRICULAR RATE- MUSE: 62 BPM

## 2024-08-19 ENCOUNTER — TELEPHONE (OUTPATIENT)
Dept: TRANSPLANT | Facility: CLINIC | Age: 74
End: 2024-08-19
Payer: MEDICARE

## 2024-08-19 DIAGNOSIS — E78.00 HYPERCHOLESTEROLEMIA: ICD-10-CM

## 2024-08-19 RX ORDER — ATORVASTATIN CALCIUM 20 MG/1
20 TABLET, FILM COATED ORAL EVERY EVENING
Qty: 90 TABLET | Refills: 0 | Status: SHIPPED | OUTPATIENT
Start: 2024-08-19 | End: 2024-08-22

## 2024-08-19 NOTE — PROGRESS NOTES
Outcome for 08/19/24 12:04 PM: [employer] message sent  Jud Cabrera MA  Outcome for 08/28/24 10:55 AM: Data obtained via phone and located below  Gracie Perez LPN     Patient is showing 3/5 MNCM met. BP out range and Aspirin not prescribed   Gracie Perez LPN      99  249  104  121  220  97  94  102  186  160  106  84  69  103  147  123  115  91  105  89  193

## 2024-08-19 NOTE — TELEPHONE ENCOUNTER
Called pts daughter to check in on interest in transplant. Left VM with direct line for return call.

## 2024-08-20 NOTE — TELEPHONE ENCOUNTER
Returned call to pts daughter. Pt has not attended therapy this summer due to other appts and things going on. Pt has reported to daughter a few times that she is not interested in transplant anymore due to needing to work with prothesis but also is having a hard time thinking of continuing to be on dialysis. Will touch base with HD SW and primary nephrologist regarding pt interest in transplant.     Called HD center and talked with Daniela SYLVESTER.  At some point pt was working with PT and prothesis was able to stand at the scale. Pt has a hard time working with a prothesis and unsure if she will ever be able to. HD SW will send message to primary nephrologist and discuss with pt and daughter. Provided direct line for return call after discussion.

## 2024-08-21 NOTE — TELEPHONE ENCOUNTER
Received voicemail from HD Daniela SYLVESTER. Pt will be meeting with [facility] and nephrologist to discuss transplant interest on 9/17/24. At this time she is not interested in transplant. She wouldn't rule it out in the future but pt felt overwhelmed with PT and orthotics. Pt felt like she didn't have a break and feels that she cannot do it right now. Feels safe at HD and feels okay if that's the way it has to be.     Called pts daughter to update on above voicemail. Left  with direct line for return call.

## 2024-08-22 ENCOUNTER — MYC REFILL (OUTPATIENT)
Dept: FAMILY MEDICINE | Facility: CLINIC | Age: 74
End: 2024-08-22
Payer: MEDICARE

## 2024-08-22 ENCOUNTER — MYC MEDICAL ADVICE (OUTPATIENT)
Dept: FAMILY MEDICINE | Facility: CLINIC | Age: 74
End: 2024-08-22
Payer: MEDICARE

## 2024-08-22 DIAGNOSIS — E78.00 HYPERCHOLESTEROLEMIA: ICD-10-CM

## 2024-08-22 DIAGNOSIS — I10 HYPERTENSION GOAL BP (BLOOD PRESSURE) < 140/90: ICD-10-CM

## 2024-08-22 DIAGNOSIS — E55.9 VITAMIN D DEFICIENCY: ICD-10-CM

## 2024-08-22 RX ORDER — CHOLECALCIFEROL (VITAMIN D3) 50 MCG
1 TABLET ORAL DAILY
Qty: 90 TABLET | Refills: 2 | OUTPATIENT
Start: 2024-08-22

## 2024-08-22 RX ORDER — ATORVASTATIN CALCIUM 20 MG/1
20 TABLET, FILM COATED ORAL EVERY EVENING
Qty: 90 TABLET | Refills: 3 | Status: SHIPPED | OUTPATIENT
Start: 2024-08-22

## 2024-08-22 RX ORDER — AMLODIPINE BESYLATE 5 MG/1
5 TABLET ORAL 2 TIMES DAILY
Qty: 180 TABLET | Refills: 0 | Status: SHIPPED | OUTPATIENT
Start: 2024-08-22

## 2024-08-22 RX ORDER — AMLODIPINE BESYLATE 5 MG/1
5 TABLET ORAL 2 TIMES DAILY
Qty: 180 TABLET | Refills: 3 | Status: SHIPPED | OUTPATIENT
Start: 2024-08-22

## 2024-08-22 RX ORDER — ATORVASTATIN CALCIUM 20 MG/1
20 TABLET, FILM COATED ORAL EVERY EVENING
Qty: 90 TABLET | Refills: 0 | OUTPATIENT
Start: 2024-08-22

## 2024-08-22 RX ORDER — AMLODIPINE BESYLATE 5 MG/1
5 TABLET ORAL 2 TIMES DAILY
Qty: 180 TABLET | Refills: 0 | Status: SHIPPED | OUTPATIENT
Start: 2024-08-22 | End: 2024-08-22

## 2024-08-22 NOTE — TELEPHONE ENCOUNTER
Please see pt's My chart message. Orders have been pended in a previous encounter. Thanks    Dionne Lei RN  [facility]

## 2024-08-28 ENCOUNTER — TELEPHONE (OUTPATIENT)
Dept: ENDOCRINOLOGY | Facility: CLINIC | Age: 74
End: 2024-08-28
Payer: MEDICARE

## 2024-08-28 NOTE — TELEPHONE ENCOUNTER
Spoke with patient daughter in regards to obtaining blood glucose readings for appointment scheduled on 8/30/24. Data obtained via phone and located below.     99  249  104  121  220  97  94  102  186  160  106  84  69  103  147  123  115  91  105  89  193    Gracie Perez LPN 08/28/24 10:55 AM

## 2024-08-29 DIAGNOSIS — E11.3213 TYPE 2 DIABETES MELLITUS WITH MILD NONPROLIFERATIVE RETINOPATHY OF BOTH EYES AND MACULAR EDEMA, UNSPECIFIED WHETHER LONG TERM INSULIN USE (H): Primary | ICD-10-CM

## 2024-08-30 ENCOUNTER — VIRTUAL VISIT (OUTPATIENT)
Dept: ENDOCRINOLOGY | Facility: CLINIC | Age: 74
End: 2024-08-30
Payer: MEDICARE

## 2024-08-30 DIAGNOSIS — Z79.4 TYPE 2 DIABETES MELLITUS WITH DIABETIC NEUROPATHY, WITH LONG-TERM CURRENT USE OF INSULIN (H): Primary | ICD-10-CM

## 2024-08-30 DIAGNOSIS — E11.40 TYPE 2 DIABETES MELLITUS WITH DIABETIC NEUROPATHY, WITH LONG-TERM CURRENT USE OF INSULIN (H): Primary | ICD-10-CM

## 2024-08-30 PROCEDURE — 99214 OFFICE O/P EST MOD 30 MIN: CPT | Mod: 95 | Performed by: PHYSICIAN ASSISTANT

## 2024-08-30 ASSESSMENT — PAIN SCALES - GENERAL: PAINLEVEL: NO PAIN (0)

## 2024-08-30 NOTE — NURSING NOTE
Current patient location: [address] [facility] [address]    Is the patient currently in the state of [place]? YES    Visit mode:VIDEO    If the visit is dropped, the patient can be reconnected by: VIDEO VISIT: Text to cell phone:   Telephone Information:   Mobile [phone]       Will anyone else be joining the visit? NO  (If patient encounters technical issues they should call [phone]    How would you like to obtain your AVS? MyChart    Are changes needed to the allergy or medication list? No    Are refills needed on medications prescribed by this physician? NO    Rooming Documentation:  Questionnaire(s) not done per department protocol      Reason for visit: RECHECK Shelby Kocher VVF
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
## 2024-09-04 ENCOUNTER — OFFICE VISIT (OUTPATIENT)
Dept: ORTHOPEDICS | Facility: CLINIC | Age: 74
End: 2024-09-04
Payer: MEDICARE

## 2024-09-04 DIAGNOSIS — B35.1 OM (ONYCHOMYCOSIS): ICD-10-CM

## 2024-09-04 DIAGNOSIS — I73.89 OTHER SPECIFIED PERIPHERAL VASCULAR DISEASES (H): Primary | ICD-10-CM

## 2024-09-04 DIAGNOSIS — I73.9 PAD (PERIPHERAL ARTERY DISEASE) (H): ICD-10-CM

## 2024-09-04 PROCEDURE — 11720 DEBRIDE NAIL 1-5: CPT | Mod: Q8 | Performed by: PODIATRIST

## 2024-09-04 PROCEDURE — 99213 OFFICE O/P EST LOW 20 MIN: CPT | Mod: 25 | Performed by: PODIATRIST

## 2024-09-04 NOTE — PROGRESS NOTES
Allergies   Allergen Reactions    Ampicillin-Sulbactam Sodium Rash     No evidence SJS, but very uncomfortable and precipitated multiple provider visits. Would not use penicillins again if other options available.     Penicillins Rash         Subjective: Supriya is a 74 year old female who presents to the clinic today for a diabetic foot exam and management.  Her nails do get long.  She has a history of amputation and from previous notes, we have documented that she has nonpalpable DP and PT pulses.      Objective    Hemoglobin A1C   Date Value Ref Range Status   04/21/2023 6.8 (H) 0.0 - 5.6 % Final     Comment:     Normal <5.7%   Prediabetes 5.7-6.4%    Diabetes 6.5% or higher     Note: Adopted from ADA consensus guidelines.   04/09/2021 6.2 (H) 0 - 5.6 % Final     Comment:     Normal <5.7% Prediabetes 5.7-6.4%  Diabetes 6.5% or higher - adopted from ADA   consensus guidelines.           Non-palpable DP and PT pulses L.   Equinus noted BL. Pes planus with rigid toe deformities noted to lesser digits on the right. Left AKA noted.   Nails are thickened, discolored, elongated, with subungual debris consistent with onychomycosis.          Assessment: DM2 with left AKA and neuropathy. She has severe vasculopathy.  Onychomycosis.   Tyloma     Plan:   - Pt seen and evaluated  - Nails debrided x 5.  - Cont compression socks.  - See again in 3 months.

## 2024-09-04 NOTE — LETTER
9/4/2024      Supriya Herr  [address]  [facility] [address]      Dear Colleague,    Thank you for referring your patient, Supriya Herr, to the [facility] [place]. Please see a copy of my visit note below.               Allergies   Allergen Reactions     Ampicillin-Sulbactam Sodium Rash     No evidence SJS, but very uncomfortable and precipitated multiple provider visits. Would not use penicillins again if other options available.      Penicillins Rash         Subjective: Supriya is a 74 year old female who presents to the clinic today for a diabetic foot exam and management.  Her nails do get long.  She has a history of amputation and from previous notes, we have documented that she has nonpalpable DP and PT pulses.      Objective    Hemoglobin A1C   Date Value Ref Range Status   04/21/2023 6.8 (H) 0.0 - 5.6 % Final     Comment:     Normal <5.7%   Prediabetes 5.7-6.4%    Diabetes 6.5% or higher     Note: Adopted from ADA consensus guidelines.   04/09/2021 6.2 (H) 0 - 5.6 % Final     Comment:     Normal <5.7% Prediabetes 5.7-6.4%  Diabetes 6.5% or higher - adopted from ADA   consensus guidelines.           Non-palpable DP and PT pulses L.   Equinus noted BL. Pes planus with rigid toe deformities noted to lesser digits on the right. Left AKA noted.   Nails are thickened, discolored, elongated, with subungual debris consistent with onychomycosis.          Assessment: DM2 with left AKA and neuropathy. She has severe vasculopathy.  Onychomycosis.   Tyloma     Plan:   - Pt seen and evaluated  - Nails debrided x 5.  - Cont compression socks.  - See again in 3 months.      Again, thank you for allowing me to participate in the care of your patient.        Sincerely,        Eleazar Pack DPM

## 2024-09-11 ENCOUNTER — OFFICE VISIT (OUTPATIENT)
Dept: OPHTHALMOLOGY | Facility: CLINIC | Age: 74
End: 2024-09-11
Attending: OPHTHALMOLOGY
Payer: MEDICARE

## 2024-09-11 DIAGNOSIS — E11.3213 TYPE 2 DIABETES MELLITUS WITH MILD NONPROLIFERATIVE RETINOPATHY OF BOTH EYES AND MACULAR EDEMA, UNSPECIFIED WHETHER LONG TERM INSULIN USE (H): ICD-10-CM

## 2024-09-11 PROCEDURE — 92134 CPTRZ OPH DX IMG PST SGM RTA: CPT | Performed by: OPHTHALMOLOGY

## 2024-09-11 PROCEDURE — G0463 HOSPITAL OUTPT CLINIC VISIT: HCPCS | Mod: 25 | Performed by: OPHTHALMOLOGY

## 2024-09-11 PROCEDURE — 92235 FLUORESCEIN ANGRPH MLTIFRAME: CPT | Performed by: OPHTHALMOLOGY

## 2024-09-11 PROCEDURE — 99214 OFFICE O/P EST MOD 30 MIN: CPT | Performed by: OPHTHALMOLOGY

## 2024-09-11 ASSESSMENT — CONF VISUAL FIELD
OS_INFERIOR_NASAL_RESTRICTION: 3
OS_INFERIOR_TEMPORAL_RESTRICTION: 3
OD_SUPERIOR_NASAL_RESTRICTION: 0
OD_INFERIOR_NASAL_RESTRICTION: 0
OD_SUPERIOR_TEMPORAL_RESTRICTION: 0
OD_INFERIOR_TEMPORAL_RESTRICTION: 0
OD_NORMAL: 1
METHOD: COUNTING FINGERS

## 2024-09-11 ASSESSMENT — TONOMETRY
OS_IOP_MMHG: 14
OD_IOP_MMHG: 12
IOP_METHOD: TONOPEN

## 2024-09-11 ASSESSMENT — REFRACTION_WEARINGRX
OD_CYLINDER: +1.00
OS_AXIS: 130
OD_AXIS: 072
OS_CYLINDER: +1.75
SPECS_TYPE: BIFOCAL
OD_SPHERE: -1.00
OS_ADD: +2.50
OD_ADD: +2.50
OS_SPHERE: -4.00

## 2024-09-11 ASSESSMENT — EXTERNAL EXAM - RIGHT EYE: OD_EXAM: NORMAL

## 2024-09-11 ASSESSMENT — SLIT LAMP EXAM - LIDS
COMMENTS: NORMAL
COMMENTS: NORMAL

## 2024-09-11 ASSESSMENT — VISUAL ACUITY
METHOD: SNELLEN - LINEAR
OS_PH_CC+: -2
OS_CC+: +2
OS_PH_CC: 20/25
OD_CC+: +1
OS_CC: 20/30
OD_CC: 20/40

## 2024-09-11 ASSESSMENT — CUP TO DISC RATIO
OS_RATIO: 0.3
OD_RATIO: 0.3

## 2024-09-11 NOTE — PROGRESS NOTES
CC: follow up  NPDR and DME.   HPI: 74 year old female with history of  NPDR and DME.   Interim: no changes in vision;  No new flashes or floaters; no eye pain  Imaging:  Optical Coherence Tomography: 09/11/24   right eye: central stable lamellar hole. Mild  IRF, choroid normal, hyaloid . Mild Epiretinal membrane. Stable   Left eye: irregular fovea, irregular inner retinal contour, mild IRF rashid nasally, microaneurysms. stable    fluorescein angiography: 09/11/24     Right eye: blockage of fluorescein angiography on the areas of heme; microaneurysms; mild late Diabetic macular edema and PP leakage.  Few capillary non perfusion   Left eye: microaneurysms; mild late Diabetic macular edema; staining of the peripheral Chorioretinal  scars. Few capillary non perfusion   Stable  No neovascularization elsewhere; no neovascularization of the disc.    Assessment & Plan:  #DMII  Lab Results   Component Value Date    A1C 6.8 04/21/2023    A1C 6.2 11/23/2021    A1C 6.2 04/09/2021    A1C 6.0 06/22/2018    A1C 5.4 03/29/2018    A1C 6.8 01/09/2018    A1C 9.6 06/09/2017      # Moderate nonproliferative diabetic retinopathy both eyes   - stable.    # mild Diabetic macular edema both eyes  - stable  observe    # H/o right eye - retinal macroaneurysm    - at the sup temp margin of disc may contribute to macular edema;    - status post avastin inj x2 for cystoid macular edema with improvement- last injection was in 2019   - No longer present- previously observed to be thrombosing   - Last DOROTEO 8/15/19 (#1)    #. Mod Hypertensive retinopathy both eyes   - Stage 5 kidney disease   - blood pressure control has been poor in 160s/90s-100s  - improved control per patient recently     # Pseudophakia both eyes  8-11-20 right eye   8-17-22 YAG OD    #. History of Macula hole repair left eye by Dr. Daniel  S/p PPV, mp, air-fluid exchange, SF6 gas infusion, left eye 2/14/2012 by Dr. Daniel    - residual lamellar hole, but outer retina  closed  08/17/22  Peripheral Chorioretinal  Scars with  IN area of shallow elevation anterior to the scars. questionable shallow SRF with demarcation line, possible old laser and not extending posteriorly.   These are chronic findings and present in prior optos photos from 2019.   Stable, unchanged 04/26/23   Will monitor.      # Dry eye syndrome both eyes   Status post punctal plugs   artificial tears  As needed and warm compresses     PLAN:   - Blood pressure (<120/80) and blood glucose (HbA1c <7.0) control discussed with patient.   - Patient advised that failure to adequately control each may lead to vision loss. The patient expressed understanding.  - follow up in 6 months with Optical Coherence Tomography both eyes and fluorescein angiography transits right eye    ~~~~~~~~~~~~~~~~~~~~~~~~~~~~~~~~~~   Complete documentation of historical and exam elements from today's encounter can be found in the full encounter summary report (not reduplicated in this progress note).  I personally obtained the chief complaint(s) and history of present illness.  I confirmed and edited as necessary the review of systems, past medical/surgical history, family history, social history, and examination findings as documented by others; and I examined the patient myself.  I personally reviewed the relevant tests, images, and reports as documented above.  I formulated and edited as necessary the assessment and plan and discussed the findings and management plan with the patient and family    Leanne Jett MD   of Ophthalmology.  Retina Service   Department of Ophthalmology and Visual Neurosciences   [facility]  Phone: [phone]   Fax: [phone]

## 2024-09-17 NOTE — PROGRESS NOTES
SUBJECTIVE:   Supriya Herr is a 67 year old female who presents to clinic today for the following health issues:    Diabetes Follow-up    Patient is checking blood sugars: twice daily.    Blood sugar testing frequency justification: Uncontrolled diabetes and Adjustment of medication(s)  Results are as follows:       am -        Suppertime before or after meal - 200-500        Diabetic concerns: Right side pain     Symptoms of hypoglycemia (low blood sugar): none     Paresthesias (numbness or burning in feet) or sores: Yes- hand      Date of last diabetic eye exam: Within the last year       Amount of exercise or physical activity: None    Problems taking medications regularly: No    Medication side effects: none    Diet: regular (no restrictions), low sodium       Hyperlipidemia Follow-Up      Rate your low fat/cholesterol diet?: good    Taking statin?  Yes, no muscle aches from statin    Other lipid medications/supplements?:  none    Hypertension Follow-up      Outpatient blood pressures are not being checked.    Low Salt Diet: no added salt    Depression Followup    Status since last visit: Stable for now    See PHQ-9 for current symptoms.  Other associated symptoms: None    Complicating factors:   Significant life event:  No   Current substance abuse:  None  Anxiety or Panic symptoms:  No    PHQ-9  English  PHQ-9   Any Language  Chronic Kidney Disease Follow-up      Current NSAID use?  No              Problem list and histories reviewed & adjusted, as indicated.  Additional history: as documented    Labs reviewed in EPIC    Reviewed and updated as needed this visit by clinical staff     Reviewed and updated as needed this visit by Provider          Recently has developed an ache in left anterior shoulder and right low back when trying to pull herself out of wheelchair    ROS:  Constitutional, HEENT, cardiovascular, pulmonary, gi and gu systems are negative, except as otherwise noted.      OBJECTIVE:    /61 (BP Location: Left arm, Patient Position: Chair, Cuff Size: Adult Large)  Pulse 77  Temp 96.9  F (36.1  C) (Oral)  Wt 224 lb (101.6 kg)  SpO2 95%  BMI 36.15 kg/m2  Body mass index is 36.15 kg/(m^2).  GENERAL: alert, elderly and fatigued  NECK: no adenopathy, no asymmetry, masses, or scars and thyroid normal to palpation  RESP: lungs clear to auscultation - no rales, rhonchi or wheezes  CV: regular rates and rhythm and normal S1 S2, no S3 or S4  ABDOMEN: soft, nontender, no hepatosplenomegaly, no masses and bowel sounds normal  MS: muscle wasting leg and tenderness to palpation right low back and left posterior rotator cuff  right 5th finger with normal ROm but tender along medial aspect  NEURO: weakness of arms and mentation intact  PSYCH: mentation appears normal, affect normal/bright    Diagnostic Test Results:  Results for orders placed or performed in visit on 09/21/17   CBC with platelets   Result Value Ref Range    WBC 6.6 4.0 - 11.0 10e9/L    RBC Count 3.39 (L) 3.8 - 5.2 10e12/L    Hemoglobin 9.7 (L) 11.7 - 15.7 g/dL    Hematocrit 29.6 (L) 35.0 - 47.0 %    MCV 87 78 - 100 fl    MCH 28.6 26.5 - 33.0 pg    MCHC 32.8 31.5 - 36.5 g/dL    RDW 13.3 10.0 - 15.0 %    Platelet Count 290 150 - 450 10e9/L       ASSESSMENT/PLAN:             1. Type 2 diabetes mellitus with stage 3 chronic kidney disease, without long-term current use of insulin (H)  Not Well controlled , will increase novalog as per Endocrine   Follow up with consultant as planned.   - Lipid panel reflex to direct LDL  - Comprehensive metabolic panel  - CBC with platelets    2. Hypertension goal BP (blood pressure) < 140/90  Well controlled   - Lipid panel reflex to direct LDL  - Comprehensive metabolic panel    3. CKD (chronic kidney disease) stage 3, GFR 30-59 ml/min  recheck    4. Acute pain of left shoulder  nees to go to PHYSICAL THERAPY to maintain mobility  - PHYSICAL THERAPY REFERRAL    5. Acute right-sided low back pain without  sciatica  As above  - PHYSICAL THERAPY REFERRAL    6. Sprain of other site of right little finger, initial encounter  Ice, rest    7. Need for prophylactic vaccination and inoculation against influenza  done  - FLU VACCINE, INCREASED ANTIGEN, PRESV FREE, AGE 65+ [76810]  - ADMIN INFLUENZA (For MEDICARE Patients ONLY) []    See Patient Instructions    Noam Jackson MD  [facility] [place]  Injectable Influenza Immunization Documentation    1.  Is the person to be vaccinated sick today?   No    2. Does the person to be vaccinated have an allergy to a component   of the vaccine?   No    3. Has the person to be vaccinated ever had a serious reaction   to influenza vaccine in the past?   No    4. Has the person to be vaccinated ever had Guillain-Barré syndrome?   No    Form completed by Celestina Wei CMA            4 = No assist / stand by assistance

## 2024-09-18 ENCOUNTER — OFFICE VISIT (OUTPATIENT)
Dept: ORTHOPEDICS | Facility: CLINIC | Age: 74
End: 2024-09-18
Payer: MEDICARE

## 2024-09-18 ENCOUNTER — ANCILLARY PROCEDURE (OUTPATIENT)
Dept: GENERAL RADIOLOGY | Facility: CLINIC | Age: 74
End: 2024-09-18
Attending: PODIATRIST
Payer: MEDICARE

## 2024-09-18 DIAGNOSIS — L53.9 ERYTHEMA: Primary | ICD-10-CM

## 2024-09-18 DIAGNOSIS — L53.9 ERYTHEMA: ICD-10-CM

## 2024-09-18 PROCEDURE — 73660 X-RAY EXAM OF TOE(S): CPT | Mod: RT | Performed by: RADIOLOGY

## 2024-09-18 PROCEDURE — 99213 OFFICE O/P EST LOW 20 MIN: CPT | Performed by: PODIATRIST

## 2024-09-18 RX ORDER — CLINDAMYCIN HCL 300 MG
300 CAPSULE ORAL 3 TIMES DAILY
Qty: 15 CAPSULE | Refills: 0 | Status: SHIPPED | OUTPATIENT
Start: 2024-09-18

## 2024-09-18 NOTE — PROGRESS NOTES
REQUISITION AND JUSTIFICATION FOR DURABLE MEDICAL EQUIPMENT    Patient Name:  Supriya Herr  MR #:  [id number]  :  1950  Age/Gender:  74 year old female  Visit Date:  Supriya Herr seen for seating and wheeled mobility evaluation by Brenna Clemente OTR/L,ATP and ATP from [facility] on 24.    CLINICAL CRITERIA FOR MOBILITY ASSISTIVE EQUIPMENT  Coverage Criteria Per Local Coverage Determination  A) Supriya has mobility limitations due to left above-knee amputation, diabetes type 2, rib fracture, CKD, CHF, with dialysis that significantly impairs her ability to participate in all of her mobility-related activities of daily living (MRADL). Specifically affected are toileting (being able to get there in time to prevent accidents), dressing, and bathing (getting into the bathroom of designated place). Current equipment used is quickie 2 K5 from 2018. This patient needs the new equipment requested to be able to continue to be independent with all MRADLS as chair is used full-time. Please see additional documentation in the seating and wheeled mobility report for details.   Supriya had a successful clinical trial here, and also a successful trial at home with the recommended equipment. Supriya is very willing and physically / cognitively able to use the recommended equipment to assist her with mobility-related activities of daily living and general mobility.   B) Supriya's mobility limitation cannot be sufficiently and safely resolved by the use of an appropriately fitted cane or walker because she is nonambulatory no longer using prosthetic. Distance and time to ambulate not tested as unable. Strength of legs is limited for one maximal repetition. Fatigue also impacts this patient's ability to ambulate, regardless of the gait aid.    C) Supriya does not have sufficient upper extremity function to self-propel a k1-4 manual wheelchair and requires an optimally-configured k5  manual wheelchair in her home to  "perform MRADLs during a typical day due to limitations in strength, endurance, range of motion, and coordination.  Strength of arms is wfl.  D) The need for this equipment is LIFETIME.   RECOMMENDATIONS FOR MOBILITY BASE, SEATING SYSTEM AND COMPONENTS  Bienvenido ultra lightweight manual wheelchair - this ultra light weight manual wheelchair is appropriate and necessary for her to be able to assist and complete all of her MRADLs within her residence. She has mobility limitations impacting her ability to ambulate independently or with any ambulation aid. She has had a thorough clinical evaluation, and this manual wheelchair is the best option for this patient.  Any less costly wheelchair option would be unable to accommodate anterior-posterior axle adjustments to maximize propulsion mechanics required for shoulder preservation in full-time, active manual wheelchair propeller.      20\" seat depth-needed to fit hip with    Heel loops-needed for rearward foot support safely keeping legs on footplate's with use    8 degree back canes- allows for proper integration with backrest and prevents digging into their back with sitting in chair.    Soft urethane tires - for maintenance free mobility allowing optimal propulsion mechanics    Plastic coated handrims- to allow for ergonomic  and increase propulsion techniques    Extension handle for wheel locks- allows for independence use of brakes for safety with chair use.    2 post, flip back, height adjustable, removable, full length armrests - needed for arm support at appropriate height, not available with standard armrests    Rear anti-tip tubes - for safety to prevent w/c tipping rearward    Pelvic positioning strap - to assist maintaining pelvis position for functional activities    Transit option- She must frequently travel to medical and therapy appointments in the community and travel using paratransit/public transit/wheelchair adapted van.  Due to weakness, " in-coordination, and safety concerns she is unable to independently initiate and safely complete a transfer from his/her wheelchair to the crash tested automobile, paratransit or public transportation vehicle seat.  The concept of a wheelchair transportation safety system allows for a securement system for anchoring the wheelchair to the floor of the motor vehicle, has an occupant restraint to hold the individual in their wheelchair and prevent secondary injury from hitting obstacles within the vehicle or being ejected from the vehicle and includes identified securement points to correctly secure the wheelchair to the floor, an occupant pelvic safety belt, and the strength and design features.    [employer] select seat cushion - this pressure distribution and positioning seat cushion will optimally  distribute seating pressures to prevent pressure ulcers, but also provide a stable base of support for her to use during MRADLs.    Seat rigidizer - needed to increase lateral stability on chair    P[employer] Solid curved and padded back support - firm and contoured back support is needed to support Supriya's thoracolumbar area in an upright and midline position, with appropriate support pads as needed. This back support is essential to provide sufficient posterior support to maximize her postural alignment and minimize her tendency to develop scoliosis and other secondary complications.    This equipment is reasonable and necessary with reference to accepted standards of medical practice and treatment of this patient's condition and is not being recommended as a convenience item. Without this recommended equipment, she is highly likely to sustain injuries from falls, develop pressure sores or postural compensation, and/or be bed confined, which those costs far exceed the cost of the requested equipment.    Electronically signed by:  Brenna Clemente OTR/L, ATP      Occupational Therapist, Assistive Technology  Professional  [phone]      fax: [phone]      [email or web address]  [facility], [facility]  [address] [place] [address] [address].  [facility] [address]  [facility] [place]   [address]  September 18, 2024      I have read and concur with the above recommendations.    Physician Printed Name __________________________________________    Physician SIgnature  _____________________________________________    Date of SIgnature ______________________________    Physician Phone  ______________________________

## 2024-09-18 NOTE — LETTER
9/18/2024      Supriya Herr  [address]  [facility] [address]      Dear Colleague,    Thank you for referring your patient, Supriya Herr, to the [facility] [place]. Please see a copy of my visit note below.    Chief Complaint:   Chief Complaint   Patient presents with     Right 2nd Toe - RECHECK          Allergies   Allergen Reactions     Ampicillin-Sulbactam Sodium Rash     No evidence SJS, but very uncomfortable and precipitated multiple provider visits. Would not use penicillins again if other options available.      Penicillins Rash         Subjective: Supriya is a 74 year old female who presents to the clinic today for a follow up of right 2nd toe. Caroline, her daughter, notes that the 2nd toe got red over the last day or so. This happened previously and she took clindamycin and this helped. No pain to the digit.     Objective  Data Unavailable Data Unavailable Data Unavailable Data Unavailable Data Unavailable 0 lbs 0 oz  Right 2nd digit has well defined erythema at the base without calor. No fluctuance felt in the area. No pain with palpation of the digit.     right toe xrays indicated in 3 weightbearing views.    Osteolysis noted to the 2nd met head that has been present for years. No fracture noted. No free air in the soft tissues.       Assessment:   Encounter Diagnoses   Name Primary?     Erythema Yes         Plan:   - Pt seen and evaluated  - XRs taken and independently interpreted by myself. See above note.   - No fluid areas to drain.  - Rx for PO clindamycin.   - Pt to return to clinic at next scheduled visit, or sooner if toe does not resolve.       Again, thank you for allowing me to participate in the care of your patient.        Sincerely,        Eleazar Pack DPM

## 2024-09-19 NOTE — PROGRESS NOTES
Chief Complaint:   Chief Complaint   Patient presents with    Right 2nd Toe - RECHECK          Allergies   Allergen Reactions    Ampicillin-Sulbactam Sodium Rash     No evidence SJS, but very uncomfortable and precipitated multiple provider visits. Would not use penicillins again if other options available.     Penicillins Rash         Subjective: Supriya is a 74 year old female who presents to the clinic today for a follow up of right 2nd toe. Caroline, her daughter, notes that the 2nd toe got red over the last day or so. This happened previously and she took clindamycin and this helped. No pain to the digit.     Objective  Data Unavailable Data Unavailable Data Unavailable Data Unavailable Data Unavailable 0 lbs 0 oz  Right 2nd digit has well defined erythema at the base without calor. No fluctuance felt in the area. No pain with palpation of the digit.     right toe xrays indicated in 3 weightbearing views.    Osteolysis noted to the 2nd met head that has been present for years. No fracture noted. No free air in the soft tissues.       Assessment:   Encounter Diagnoses   Name Primary?    Erythema Yes         Plan:   - Pt seen and evaluated  - XRs taken and independently interpreted by myself. See above note.   - No fluid areas to drain.  - Rx for PO clindamycin.   - Pt to return to clinic at next scheduled visit, or sooner if toe does not resolve.

## 2024-09-20 ENCOUNTER — TELEPHONE (OUTPATIENT)
Dept: ENDOCRINOLOGY | Facility: CLINIC | Age: 74
End: 2024-09-20
Payer: MEDICARE

## 2024-09-20 NOTE — TELEPHONE ENCOUNTER
Sent Mychart (1st Attempt) and Patient Contacted for the patient to call back and schedule the following:    Appointment type: Return Diabetes  Provider: Arabella Kamara  Return date: 6 mo (around 2/28/25)  Specialty phone number: [phone]  Additional appointment(s) needed: NA  Additonal Notes: spoke with patient daughter, they declined being available. Requests call back next week. Sent myc x1      Check Out Comments: Appt with me in 6 months.

## 2024-09-25 NOTE — TELEPHONE ENCOUNTER
LVM and Sent Mychart (2nd attempt)  for the patient to call back and schedule the following:     Appointment type: Return Diabetes  Provider: Arabella Kamara  Return date: 6 mo (around 2/28/25)  Specialty phone number: [phone]  Additional appointment(s) needed: NA  Additonal Notes: spoke with patient daughter, they declined being available. LVM and sent myc - second attempt        Check Out Comments: Appt with me in 6 months.

## 2024-09-26 ENCOUNTER — MEDICAL CORRESPONDENCE (OUTPATIENT)
Dept: HEALTH INFORMATION MANAGEMENT | Facility: CLINIC | Age: 74
End: 2024-09-26
Payer: MEDICARE

## 2024-10-01 ENCOUNTER — TELEPHONE (OUTPATIENT)
Dept: TRANSPLANT | Facility: CLINIC | Age: 74
End: 2024-10-01
Payer: MEDICARE

## 2024-10-01 NOTE — TELEPHONE ENCOUNTER
Called pts daughter to check in on if they met with primary neph and SW at HD. Left VM with direct line for return call.     Called HD center to follow up with SW to see if they discussed transplant with pt on 9/17/24. SW was unable to be apart of discussion but pt has told SW that she can't do therapy and wants to close her evaluation. Reports she has stated it is too much to get walking again. Dr. Basilio discussed with pt on 9/17/24, progress notes state unsure if she is still a candidate with her mobility. Message sent to Dr. Basilio to follow up with discussion.

## 2024-10-15 ENCOUNTER — DOCUMENTATION ONLY (OUTPATIENT)
Dept: FAMILY MEDICINE | Facility: CLINIC | Age: 74
End: 2024-10-15
Payer: MEDICARE

## 2024-10-15 DIAGNOSIS — G47.34 IDIOPATHIC SLEEP RELATED NONOBSTRUCTIVE ALVEOLAR HYPOVENTILATION: Primary | ICD-10-CM

## 2024-10-23 ENCOUNTER — DOCUMENTATION ONLY (OUTPATIENT)
Dept: TRANSPLANT | Facility: CLINIC | Age: 74
End: 2024-10-23
Payer: MEDICARE

## 2024-10-23 ENCOUNTER — TELEPHONE (OUTPATIENT)
Dept: TRANSPLANT | Facility: CLINIC | Age: 74
End: 2024-10-23
Payer: MEDICARE

## 2024-10-23 ENCOUNTER — COMMITTEE REVIEW (OUTPATIENT)
Dept: TRANSPLANT | Facility: CLINIC | Age: 74
End: 2024-10-23
Payer: MEDICARE

## 2024-10-23 NOTE — TELEPHONE ENCOUNTER
Called pts daughter to update on committee decision. Pt will be removed from the waitlist as she is no longer a candidate. Pt verbalized understanding of information and has no further questions. Encouraged to reach out if questions arise.

## 2024-10-23 NOTE — COMMITTEE REVIEW
"Kidney/Pancreas Committee Review Note     Evaluation Date: 3/29/2018  Committee Review Date: 10/23/2024    Organ being evaluated for: Kidney    Transplant Phase: Waitlist  Transplant Status: Inactive    Transplant Coordinator: Miguelina Villegas  Transplant Surgeon:        Referring Physician: Angelita Martin    Primary Diagnosis: Diabetes Mellitus - Type II  Secondary Diagnosis:     Committee Review Members:  Nephrology Usha Byers, NP, Myesha Swanson MD, Aly Harrington, APRN CNP, Jiaro Moody MD   Nutrition Kelly Dillard, RD   Pharmacist Diana Ramírez, [facility]    - Clinical Lupe De La Cruz, MSW, Yahir Esteves, MSW, Myra Hardy, [facility]   Transplant BETO LA, RN, Siomara Cifuentes, SHANTA, Shelia Charles, SHANTA, Shelbie Selby, APRN CNP, Gianna Sorto, SHANTA, Miguelina Villegas, RN, Alina Cameron, NP, Alina Alvarado, RN, Ruth Gaitan, RN, Deena Tran, SHANTA, aSbrina Fletcher, RN   Transplant Surgery Nicola Lutz MD       Transplant Eligibility: Irreversible chronic kidney disease treated w/dialysis or expected need for dialysis    Committee Review Decision: Remove    Relative Contraindications:     Absolute Contraindications:      Committee Chair Nicola Lutz MD verbally attested to the committee's decision.    Committee Discussion Details:     75yo w/ ESKD From DM/HTN on HD since 2020     History of L AKA from 1989. Has been wheelchair bound, saw Dr. Banks 2/2024. condition of participation was to get refitted for prothesis and complete PT with ability to walk with it due to being reliant on core muscles for transfers from wheelchair. The patient has felt a little hopeless with the need to pursue PT and use the prothesis. Discussed with HD center and Dr. Read and working with prothesis has become \"too much.\" She is no longer pursuing this. She would still need angiogram, chest CT, colonoscopy and updated RWL visits prior to active status consideration. She is " overwhelmed with the amount of visits just for PT and dialysis.      Committee in support of de listing as pt is no longer pursuing PT for prothesis and is not a candidate for kidney transplant through our center at current functional status.

## 2024-11-08 ENCOUNTER — ALLIED HEALTH/NURSE VISIT (OUTPATIENT)
Dept: OPHTHALMOLOGY | Facility: CLINIC | Age: 74
End: 2024-11-08
Attending: OPHTHALMOLOGY
Payer: MEDICARE

## 2024-11-08 DIAGNOSIS — H52.7 REFRACTION DISORDER: Primary | ICD-10-CM

## 2024-11-08 PROCEDURE — 92015 DETERMINE REFRACTIVE STATE: CPT | Mod: GY

## 2024-11-08 ASSESSMENT — REFRACTION_MANIFEST
OD_CYLINDER: +1.00
OS_SPHERE: -4.00
OS_CYLINDER: +2.00
OD_SPHERE: -1.00
OS_ADD: +2.50
OD_ADD: +2.50
OD_AXIS: 075
OS_AXIS: 130

## 2024-11-08 ASSESSMENT — VISUAL ACUITY
METHOD: SNELLEN - LINEAR
OS_CC: 20/30
OD_CC: 20/40
OD_CC+: +2
CORRECTION_TYPE: GLASSES

## 2024-11-08 ASSESSMENT — REFRACTION_WEARINGRX
SPECS_TYPE: BIFOCAL
OS_AXIS: 130
OD_AXIS: 072
OD_SPHERE: -1.00
OS_ADD: +2.50
OS_CYLINDER: +1.75
OD_CYLINDER: +1.00
OS_SPHERE: -4.00
OD_ADD: +2.50

## 2024-11-08 NOTE — NURSING NOTE
Chief Complaints and History of Present Illnesses   Patient presents with    Refraction     Tech only visit for refraction      Chief Complaint(s) and History of Present Illness(es)       Refraction              Laterality: both eyes    Comments: Tech only visit for refraction               Comments    Patient reports stable vision since last visit.      Alysia PRICE 3:09 PM November 8, 2024

## 2024-11-12 ENCOUNTER — MYC MEDICAL ADVICE (OUTPATIENT)
Dept: NEPHROLOGY | Facility: CLINIC | Age: 74
End: 2024-11-12
Payer: MEDICARE

## 2024-11-12 DIAGNOSIS — R09.81 NOSE CONGESTION: Primary | ICD-10-CM

## 2024-11-13 ENCOUNTER — HOSPITAL ENCOUNTER (OUTPATIENT)
Dept: MAMMOGRAPHY | Facility: CLINIC | Age: 74
Discharge: HOME | End: 2024-11-13
Attending: FAMILY MEDICINE | Admitting: FAMILY MEDICINE
Payer: MEDICARE

## 2024-11-13 DIAGNOSIS — Z12.31 SCREENING MAMMOGRAM FOR BREAST CANCER: ICD-10-CM

## 2024-11-13 PROCEDURE — 77063 BREAST TOMOSYNTHESIS BI: CPT

## 2024-11-27 DIAGNOSIS — E55.9 VITAMIN D DEFICIENCY: ICD-10-CM

## 2024-11-27 RX ORDER — CHOLECALCIFEROL (VITAMIN D3) 50 MCG
1 TABLET ORAL DAILY
Qty: 90 TABLET | Refills: 2 | OUTPATIENT
Start: 2024-11-27

## 2024-12-03 ENCOUNTER — MYC MEDICAL ADVICE (OUTPATIENT)
Dept: DERMATOLOGY | Facility: CLINIC | Age: 74
End: 2024-12-03
Payer: MEDICARE

## 2024-12-03 ENCOUNTER — MYC REFILL (OUTPATIENT)
Dept: NEPHROLOGY | Facility: CLINIC | Age: 74
End: 2024-12-03
Payer: MEDICARE

## 2024-12-03 DIAGNOSIS — L40.8 INVERSE PSORIASIS: ICD-10-CM

## 2024-12-03 RX ORDER — TRIAMCINOLONE ACETONIDE 0.25 MG/G
OINTMENT TOPICAL 2 TIMES DAILY
Qty: 80 G | Refills: 11 | Status: SHIPPED | OUTPATIENT
Start: 2024-12-03

## 2024-12-03 NOTE — TELEPHONE ENCOUNTER
Encounter Date: Jul 12, 2024  Office Visit      Reviewed patients past medical history and pertinent chart review prior to patients visit today.      Dermatology Problem List:  1. 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 Ischemic ulcer of the R 3rd finger, suspect due to #1 also in the setting of neuropathy              - near resolved s/p fistula banding above              - previous hand surgery referral --> no indication for amputation  3. Psoriasis, inverse and guttate              - Current: triamcinolone 0.025% ointment, Protopic 0.1 % external ointment              - Past: apremilast with renal dosing (stopped 1/2024), M-F calcipotriene BID, Sa-[place] betamethasone ointment  4. Hx morbilliform drug eruption 2/2 Unasyn 7/2018 (resolved)  5. Vitiligo              - no active rx  6. Hx NMSC              - BCC (reported), R ankle  7. Intertrigo              -  miconazole 2% powder BID  8. Hx of R foot plantar ulcer, s/p excision 6/24/18  9. Frictional Hyperkeratosis of L lower extremity.      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   Last eye exam: 11/1/23     ____________________________________________     Assessment & Plan     # Inverse psoriasis     Patient doing well off of Otezla. Continue topicals below.      Flares of the trunk and extremities:   - triamcinolone 0.1% ointment twice daily for 2-4 weeks until clear. Potential side effects, including skin atrophy, reviewed.     Maintenance of the trunk and extremities:  - calcipotriene 0.005% ointment twice daily on weekdays.  - triamcinolone 0.1% ointment twice daily on weekends.   - miconazole powder to skin folds.      # Personal history of nonmelanoma skin cancer  - No signs of recurrence. Continued observation recommended.   - Sun protection: Counseled SPF 30+ sunscreen, UPF clothing, sun avoidance,  tanning bed avoidance.     # Multiple nevi, trunk and extremities  # Solar lentigines  - No concerning features on dermoscopy. We discussed the importance of self exams at home. ABCDE criteria and importance of photoprotection reviewed.      # Cherry angiomas  # Seborrheic keratoses  - We discussed the benign nature of the skin lesions. No treatment required. Continued observation recommended. Follow up with any concerns.

## 2024-12-05 RX ORDER — TACROLIMUS 1 MG/G
OINTMENT TOPICAL 2 TIMES DAILY
Qty: 60 G | Refills: 5 | Status: SHIPPED | OUTPATIENT
Start: 2024-12-05

## 2024-12-05 NOTE — TELEPHONE ENCOUNTER
LVD:  7/12/2024  [facility] [place]     Celine García PA-C  Dermatology   NVD 7/18/25  Refilled per protocol.  tacrolimus (PROTOPIC) 0.1 % external ointment          Sig: Apply topically 2 times daily. To skin folds

## 2024-12-09 ENCOUNTER — MEDICAL CORRESPONDENCE (OUTPATIENT)
Dept: HEALTH INFORMATION MANAGEMENT | Facility: CLINIC | Age: 74
End: 2024-12-09
Payer: MEDICARE

## 2024-12-10 NOTE — MR AVS SNAPSHOT
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      Your next 10 appointments already scheduled     Dec 07, 2018  1:40 PM CST   (Arrive by 1:25 PM)   [facility]    [address]  4th Floor  [facility] [id number] [phone]            Dec 07, 2018  2:30 PM CST   Lab with  LAB   [facility] ([facility])    [address]  1st Floor  [facility] [id number] [phone]            Dec 07, 2018  3:00 PM CST   (Arrive by 2:45 PM)   SHORT with Brenden Blackwell RPH   [facility] ([facility])    [address]  3rd Floor  [facility] [id number] [phone]            Dec 07, 2018  3:30 PM CST   (Arrive by 3:00 PM)   Return Visit with Silva Guerrero NP   [facility] ([facility])    [address]  [address]  [facility] [id number] [phone]          Detail Level: Generalized    Jan 22, 2019  3:20 PM CST   CT CHEST W/O CONTRAST with UCCT2   [facility] ([facility] [place])    [address]  1st Floor  [facility] [address] [phone]           How do I prepare for my exam? (Food and drink instructions) No Food and Drink Restrictions.  How do I prepare for my exam? (Other instructions) You do not need to do anything special to prepare for this exam. For a sinus scan: Use your nose spray (nasal decongestant spray) as directed.  What should I wear: Please wear loose clothing, such as a sweat suit or jogging clothes. Avoid snaps, zippers and other metal. We may ask you to undress and put on a hospital gown.  How long does the exam take: Most scans take less than 20 minutes.  What should I bring: Please bring any scans or X-rays taken at other hospitals, if similar tests were done. Also bring a list of your medicines, including vitamins, minerals and over-the-counter drugs. It is safest to leave personal items at home.  Do I need a : No  is needed.  What do I need to tell my doctor? Be sure to tell your doctor: * If you have any allergies. * If there s any chance you are pregnant. * If you are breastfeeding.  What should I do after the exam: No restrictions, You may resume normal activities.  What is this test: A CT (computed tomography) scan is a series of pictures that allows us to look inside your body. The scanner creates images of the body in cross sections, much like slices of bread. This helps us see any problems more clearly.  Who should I call with questions: If you have any questions, please call the Imaging Department where you will have your exam. Directions, parking instructions, and other information is available on our website, [email or web address].            Jan 22, 2019  4:30 PM CST   (Arrive by 4:15 PM)   Return Visit with Ravin King MD   [facility] ([facility] [place])     [address]  [address]  [facility] [id number] [phone]            Feb 08, 2019 11:00 AM CST   (Arrive by 10:45 AM)   RETURN DIABETES with ILA [facility] ([facility] [place])    [address]  3rd Floor  [facility] [id number] [phone]              Who to contact     If you have questions or need follow up information about today's clinic visit or your schedule please contact [facility] directly at [phone].  Normal or non-critical lab and imaging results will be communicated to you by Cloud[employer], letter or phone within 4 business days after the clinic has received the results. If you do not hear from us within 7 days, please contact the clinic through [employer] or phone. If you have a critical or abnormal lab result, we will notify you by phone as soon as possible.  Submit refill requests through [employer] or call your pharmacy and they will forward the refill request to us. Please allow 3 business days for your refill to be completed.          Additional Information About Your Visit        MyChart Information     [employer] gives you secure access to your electronic health record. If you see a primary care provider, you can also send messages to your care team and make appointments. If you have questions, please call your primary care clinic.  If you do not have a primary care provider, please call [phone] and they will assist you.        Care EveryWhere ID     This is your Care EveryWhere ID. This could be used by other organizations to access your [facility] medical records  [id number]        Your Vitals Were     Pulse Temperature Pulse Oximetry             63 98.2  F (36.8  C) (Oral) 100%          Blood Pressure from Last 3 Encounters:   11/30/18 138/59   11/15/18 160/64   11/08/18 185/71    Weight from Last 3 Encounters:   11/06/18 190 lb 6.4 oz (86.4 kg)   11/01/18 191 lb (86.6 kg)   10/24/18 186 lb (84.4  Detail Level: Zone kg)              We Performed the Following     ADMIN 1st VACCINE     HEPATITIS B VACCINE,ADULT,IM          Today's Medication Changes          These changes are accurate as of 11/30/18  1:24 PM.  If you have any questions, ask your nurse or doctor.               These medicines have changed or have updated prescriptions.        Dose/Directions    insulin glargine 100 UNIT/ML pen   This may have changed:  additional instructions   Used for:  Type 2 diabetes mellitus with diabetic neuropathy, with long-term current use of insulin (H)        Dose:  20 Units   Inject 20 Units Subcutaneous At Bedtime Inject 22 U SubCut at HS   Quantity:  3 mL   Refills:  11            Where to get your medicines      These medications were sent to [id number] - [place], [place] - [address]  [address] [address]     Phone:  [phone]     calcitRIOL 0.25 MCG capsule                Primary Care Provider Office Phone # Fax #    Noam Luis Jackson [phone]       [address]  [facility] [address]        Equal Access to Services     [facility] : Hadii aad ku hadasho Soomaali, waaxda luqadaha, qaybta kaalmada adeegyada, waxay idiin hayaan adama sood . So [facility] [phone].    ATENCIÓN: Si habla español, tiene a santoro disposición servicios gratuitos de asistencia lingüística. Xeniaflako al [phone].    We comply with applicable federal civil rights laws and [place] laws. We do not discriminate on the basis of race, color, national origin, age, disability, sex, sexual orientation, or gender identity.            Thank you!     Thank you for choosing [facility]  for your care. Our goal is always to provide you with excellent care. Hearing back from our patients is one way we can continue to improve our services. Please take a few minutes to complete the written survey that you may receive in the mail after your visit with us. Thank you!              Your Updated Medication List - Protect others around you: Learn how to safely use, store and throw away your medicines at www.disposemymeds.org.          This list is accurate as of 11/30/18  1:24 PM.  Always use your most recent med list.                   Brand Name Dispense Instructions for use Diagnosis    acetaminophen 325 MG tablet    TYLENOL    100 tablet    Take 2 tablets (650 mg) by mouth every 4 hours as needed for mild pain    Diabetic ulcer of toe of right foot associated with type 2 diabetes mellitus, with other ulcer severity (H)       albuterol 108 (90 Base) MCG/ACT inhaler    PROAIR HFA/PROVENTIL HFA/VENTOLIN HFA    3 Inhaler    Inhale 2 puffs into the lungs every 6 hours as needed for shortness of breath / dyspnea or wheezing    Cough       atorvastatin 20 MG tablet    LIPITOR    90 tablet    TAKE 1 TABLET BY MOUTH ONCE DAILY    Hyperlipidemia LDL goal <100       calcitRIOL 0.25 MCG capsule    ROCALTROL    90 capsule    TAKE 1 CAPSULE (0.25 MCG) BY MOUTH DAILY    Vitamin D deficiency       carvedilol 12.5 MG tablet    COREG    60 tablet    Take 1 tablet (12.5 mg) by mouth 2 times daily (with meals)    Essential hypertension with goal blood pressure less than 140/90       clindamycin 1 % external gel    CLINDAMAX     Apply topically 2 times daily        emollient external cream      Apply topically 2 times daily        famotidine 20 MG tablet    PEPCID    60 tablet    Take 1 tablet (20 mg) by mouth daily    Right foot pain       furosemide 40 MG tablet    LASIX    60 tablet    Take 1 tablet (40 mg) by mouth 2 times daily    CKD (chronic kidney disease) stage 5, GFR less than 15 ml/min (H), Type 2 diabetes mellitus with stage 4 chronic kidney disease, with long-term current use of insulin (H), Anemia due to stage 5 chronic kidney disease, 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 (H)       insulin glargine 100 UNIT/ML pen     3 mL    Inject 20 Units Subcutaneous At Bedtime Inject 22 U SubCut at HS    Type 2  Detail Level: Detailed diabetes mellitus with diabetic neuropathy, with long-term current use of insulin (H)       insulin pen needle 31G X 6 MM miscellaneous    ULTICARE MINI    100 each    Use daily or as directed.    Type 2 diabetes, HbA1c goal < 7% (H)       NovoLOG FLEXPEN 100 UNIT/ML pen   Generic drug:  insulin aspart     15 mL    4 Units Inject 4 units SQ with breakfast, lunch and dinner. IF SNACK BETWEEN LUNCH AND DINNER SHE TAKES AN EXTRA 2 UNITS    Type 2 diabetes mellitus with hyperglycemia, with long-term current use of insulin (H)       ONETOUCH ULTRA test strip   Generic drug:  blood glucose monitoring     400 strip    TEST YOUR BLOOD SUGAR 3-4 TIMES PER DAY.    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, subsequent encounter (H)       sertraline 25 MG tablet     ZOLOFT    30 tablet    TAKE 1 TABLET (25 MG) BY MOUTH DAILY    NICOLE (generalized anxiety disorder)       triamcinolone 0.1 % external cream    KENALOG    30 g    Apply to sites of dermatitis- the abdomen, armpits, groin as needed.    Dermatitis       vitamin D3 2000 units tablet    CHOLECALCIFEROL    100 tablet    Take 2,000 Units by mouth daily    Vitamin D deficiency       * Notice:  This list has 3 medication(s) that are the same as other medications prescribed for you. Read the directions carefully, and ask your doctor or other care provider to review them with you.

## 2024-12-10 NOTE — TELEPHONE ENCOUNTER
"FUTURE VISIT INFORMATION      FUTURE VISIT INFORMATION:  Date: 2/12/25  Time: 2:10pm  Location: [facility]  REFERRAL INFORMATION:  Referring provider:  Kathryn Basilio MD   Referring providers clinic:  [facility]   Reason for visit/diagnosis  R09.81 (ICD-10-CM) - Nose congestion, Referral from Kathryn Basilio, referral notes in [facility]. pt daughter made appt for [facility] location     RECORDS REQUESTED FROM:       Clinic name Comments Records Status Imaging Status   [facility]  11/12/24- Mychart Referral Kathryn Basilio MD  [facility]  6/11/20- Ov Jean Paul Johnson MD  Epic             \"Please notify/message CSS if patient completed outside imaging prior to scheduled appointment and/or any outside records that might have been missed at pre visit -Thanks\"  "

## 2024-12-17 ENCOUNTER — TELEPHONE (OUTPATIENT)
Dept: MULTI SPECIALTY CLINIC | Facility: CLINIC | Age: 74
End: 2024-12-17
Payer: MEDICARE

## 2024-12-17 ENCOUNTER — TELEPHONE (OUTPATIENT)
Dept: ENDOCRINOLOGY | Facility: CLINIC | Age: 74
End: 2024-12-17
Payer: MEDICARE

## 2024-12-17 ENCOUNTER — MEDICAL CORRESPONDENCE (OUTPATIENT)
Dept: HEALTH INFORMATION MANAGEMENT | Facility: CLINIC | Age: 74
End: 2024-12-17
Payer: MEDICARE

## 2024-12-17 DIAGNOSIS — L03.115 CELLULITIS OF RIGHT LOWER EXTREMITY: ICD-10-CM

## 2024-12-17 DIAGNOSIS — Z79.4 TYPE 2 DIABETES MELLITUS WITH DIABETIC NEUROPATHY, WITH LONG-TERM CURRENT USE OF INSULIN (H): ICD-10-CM

## 2024-12-17 DIAGNOSIS — E11.40 TYPE 2 DIABETES MELLITUS WITH DIABETIC NEUROPATHY, WITH LONG-TERM CURRENT USE OF INSULIN (H): ICD-10-CM

## 2024-12-17 NOTE — TELEPHONE ENCOUNTER
This message is to inform you that we are in need of Medicare compliant prescriptions for Freestyle Avery 2 Sensors.   We need one 3-month fill to get the patient by until their upcoming appointment in February.     Prescription must be written by: Arabella Kamara.  Must include full supply name: Freestyle Avery 2 Sensor  Quantity: 6  Refills: 0  SIG: Change every 14 days    As a reminder, Medicare requires patients to be seen every 3 months for insulin supplies and every 6 months for CGMs. The provider who sees the patient must be the provider on the prescription, must be written on the day of or after office visit date and office visit must include notes about diabetes and insulin regimen. The Diabetes Care Services team at [facility] may request new prescriptions before renewal dates of the prescription is filled over the allowable amount. Writing the order to match what is above will help ensure we will only need to request every 3 or 6 months.    Thank you!    [facility]  Diabetes Care Services Team  [address] [place] [address]  Provider Phone: [phone]  Patient Line: [phone]  Fax: [phone]  E-mail: [email or web address]

## 2024-12-17 NOTE — TELEPHONE ENCOUNTER
[facility]  Fax:[phone]  Spec: [phone]  MO: [phone]

## 2024-12-19 NOTE — TELEPHONE ENCOUNTER
Prior Authorization Approval    Medication: SEVELAMER CARBONATE 800 MG PO TABS  Authorization Effective Date: 1/1/2024  Authorization Expiration Date: 12/18/2025  Approved Dose/Quantity:   Reference #:     Insurance Company: Aurea Bowman - Phone [phone] Fax [phone]  Expected CoPay: $    CoPay Card Available:      Financial Assistance Needed:   Which Pharmacy is filling the prescription: [facility] - [place], [place] - [address]  Pharmacy Notified: CALL IN AM  Patient Notified: Instructed pharmacy to notify patient once order is ready.

## 2024-12-27 ENCOUNTER — TELEPHONE (OUTPATIENT)
Dept: FAMILY MEDICINE | Facility: CLINIC | Age: 74
End: 2024-12-27
Payer: MEDICARE

## 2024-12-27 NOTE — TELEPHONE ENCOUNTER
Reason for Call (Form)     Goal: Our goal is to have forms completed within 72 hours. However, some forms may require a visit or additional information.     Type of Letter, Form, or Note: form      Form Origin: [facility]     Received From: Zaida Kilgore At: [facility]     Provider: Kaylee Patel      Additional Comments: phone number:[phone]  Fax: [phone]  
bilateral upper extremity ROM was WNL (within normal limits)/bilateral lower extremity was ROM was WNL (within normal limits)

## 2024-12-29 ENCOUNTER — HEALTH MAINTENANCE LETTER (OUTPATIENT)
Age: 74
End: 2024-12-29

## 2025-01-08 ENCOUNTER — APPOINTMENT (OUTPATIENT)
Dept: GENERAL RADIOLOGY | Facility: CLINIC | Age: 75
DRG: 207 | End: 2025-01-08
Attending: EMERGENCY MEDICINE
Payer: COMMERCIAL

## 2025-01-08 ENCOUNTER — HOSPITAL ENCOUNTER (INPATIENT)
Facility: CLINIC | Age: 75
End: 2025-01-08
Attending: EMERGENCY MEDICINE
Payer: COMMERCIAL

## 2025-01-08 DIAGNOSIS — J10.1 INFLUENZA A: ICD-10-CM

## 2025-01-08 DIAGNOSIS — E87.5 HYPERKALEMIA: ICD-10-CM

## 2025-01-08 DIAGNOSIS — J96.21 ACUTE AND CHRONIC RESPIRATORY FAILURE WITH HYPOXIA (H): ICD-10-CM

## 2025-01-08 DIAGNOSIS — E11.40 TYPE 2 DIABETES MELLITUS WITH DIABETIC NEUROPATHY, WITH LONG-TERM CURRENT USE OF INSULIN (H): ICD-10-CM

## 2025-01-08 DIAGNOSIS — R79.89 ELEVATED TROPONIN: ICD-10-CM

## 2025-01-08 DIAGNOSIS — E78.5 DYSLIPIDEMIA: ICD-10-CM

## 2025-01-08 DIAGNOSIS — N18.5 ANEMIA DUE TO STAGE 5 CHRONIC KIDNEY DISEASE, NOT ON CHRONIC DIALYSIS (H): ICD-10-CM

## 2025-01-08 DIAGNOSIS — J18.9 PNEUMONIA OF RIGHT LOWER LOBE DUE TO INFECTIOUS ORGANISM: ICD-10-CM

## 2025-01-08 DIAGNOSIS — Z99.2 ESRD (END STAGE RENAL DISEASE) ON DIALYSIS (H): ICD-10-CM

## 2025-01-08 DIAGNOSIS — G92.8 TOXIC METABOLIC ENCEPHALOPATHY: ICD-10-CM

## 2025-01-08 DIAGNOSIS — N18.6 ESRD (END STAGE RENAL DISEASE) (H): ICD-10-CM

## 2025-01-08 DIAGNOSIS — D63.1 ANEMIA DUE TO STAGE 5 CHRONIC KIDNEY DISEASE, NOT ON CHRONIC DIALYSIS (H): ICD-10-CM

## 2025-01-08 DIAGNOSIS — E11.628 DIABETIC FOOT INFECTION (H): ICD-10-CM

## 2025-01-08 DIAGNOSIS — I48.0 PAROXYSMAL ATRIAL FIBRILLATION (H): ICD-10-CM

## 2025-01-08 DIAGNOSIS — U07.1 INFECTION DUE TO 2019 NOVEL CORONAVIRUS: Primary | ICD-10-CM

## 2025-01-08 DIAGNOSIS — G47.33 OSA (OBSTRUCTIVE SLEEP APNEA): ICD-10-CM

## 2025-01-08 DIAGNOSIS — K21.00 GASTROESOPHAGEAL REFLUX DISEASE WITH ESOPHAGITIS WITHOUT HEMORRHAGE: ICD-10-CM

## 2025-01-08 DIAGNOSIS — R93.89 ABNORMAL CHEST X-RAY: ICD-10-CM

## 2025-01-08 DIAGNOSIS — D64.9 ANEMIA, UNSPECIFIED TYPE: ICD-10-CM

## 2025-01-08 DIAGNOSIS — K21.9 GASTROESOPHAGEAL REFLUX DISEASE WITHOUT ESOPHAGITIS: ICD-10-CM

## 2025-01-08 DIAGNOSIS — R63.30 FEEDING DIFFICULTIES: ICD-10-CM

## 2025-01-08 DIAGNOSIS — Z79.4 TYPE 2 DIABETES MELLITUS WITH DIABETIC NEUROPATHY, WITH LONG-TERM CURRENT USE OF INSULIN (H): ICD-10-CM

## 2025-01-08 DIAGNOSIS — F33.1 MODERATE RECURRENT MAJOR DEPRESSION (H): ICD-10-CM

## 2025-01-08 DIAGNOSIS — F32.A ANXIETY AND DEPRESSION: ICD-10-CM

## 2025-01-08 DIAGNOSIS — I48.19 PERSISTENT ATRIAL FIBRILLATION (H): ICD-10-CM

## 2025-01-08 DIAGNOSIS — E11.9 TYPE 2 DIABETES MELLITUS (H): ICD-10-CM

## 2025-01-08 DIAGNOSIS — N18.5 CKD (CHRONIC KIDNEY DISEASE) STAGE 5, GFR LESS THAN 15 ML/MIN (H): ICD-10-CM

## 2025-01-08 DIAGNOSIS — L08.9 DIABETIC FOOT INFECTION (H): ICD-10-CM

## 2025-01-08 DIAGNOSIS — I73.9 PVD (PERIPHERAL VASCULAR DISEASE): ICD-10-CM

## 2025-01-08 DIAGNOSIS — E78.5 HYPERLIPIDEMIA LDL GOAL <100: ICD-10-CM

## 2025-01-08 DIAGNOSIS — F41.9 ANXIETY AND DEPRESSION: ICD-10-CM

## 2025-01-08 DIAGNOSIS — N18.6 ESRD (END STAGE RENAL DISEASE) ON DIALYSIS (H): ICD-10-CM

## 2025-01-08 DIAGNOSIS — I10 HYPERTENSION GOAL BP (BLOOD PRESSURE) < 140/90: ICD-10-CM

## 2025-01-08 LAB
ALBUMIN SERPL BCG-MCNC: 3.6 G/DL (ref 3.5–5.2)
ALP SERPL-CCNC: 72 U/L (ref 40–150)
ALT SERPL W P-5'-P-CCNC: 23 U/L (ref 0–50)
ANION GAP SERPL CALCULATED.3IONS-SCNC: 11 MMOL/L (ref 7–15)
AST SERPL W P-5'-P-CCNC: 26 U/L (ref 0–45)
ATRIAL RATE - MUSE: NORMAL BPM
BASE EXCESS BLDV CALC-SCNC: -0.8 MMOL/L (ref -3–3)
BASOPHILS # BLD AUTO: 0 10E3/UL (ref 0–0.2)
BASOPHILS NFR BLD AUTO: 1 %
BILIRUB SERPL-MCNC: 0.2 MG/DL
BUN SERPL-MCNC: 27.7 MG/DL (ref 8–23)
CALCIUM SERPL-MCNC: 9.3 MG/DL (ref 8.8–10.4)
CHLORIDE SERPL-SCNC: 98 MMOL/L (ref 98–107)
CREAT SERPL-MCNC: 4.71 MG/DL (ref 0.51–0.95)
DIASTOLIC BLOOD PRESSURE - MUSE: NORMAL MMHG
EGFRCR SERPLBLD CKD-EPI 2021: 9 ML/MIN/1.73M2
EOSINOPHIL # BLD AUTO: 0.1 10E3/UL (ref 0–0.7)
EOSINOPHIL NFR BLD AUTO: 3 %
ERYTHROCYTE [DISTWIDTH] IN BLOOD BY AUTOMATED COUNT: 14.1 % (ref 10–15)
FLUAV RNA SPEC QL NAA+PROBE: POSITIVE
FLUBV RNA RESP QL NAA+PROBE: NEGATIVE
GLUCOSE BLDC GLUCOMTR-MCNC: 133 MG/DL (ref 70–99)
GLUCOSE BLDC GLUCOMTR-MCNC: 97 MG/DL (ref 70–99)
GLUCOSE SERPL-MCNC: 155 MG/DL (ref 70–99)
HBV SURFACE AG SERPL QL IA: NONREACTIVE
HCO3 BLDV-SCNC: 27 MMOL/L (ref 21–28)
HCO3 SERPL-SCNC: 26 MMOL/L (ref 22–29)
HCT VFR BLD AUTO: 29.3 % (ref 35–47)
HGB BLD-MCNC: 8.8 G/DL (ref 11.7–15.7)
IMM GRANULOCYTES # BLD: 0 10E3/UL
IMM GRANULOCYTES NFR BLD: 1 %
INTERPRETATION ECG - MUSE: NORMAL
LACTATE SERPL-SCNC: 0.5 MMOL/L (ref 0.7–2)
LYMPHOCYTES # BLD AUTO: 0.9 10E3/UL (ref 0.8–5.3)
LYMPHOCYTES NFR BLD AUTO: 22 %
MCH RBC QN AUTO: 30.3 PG (ref 26.5–33)
MCHC RBC AUTO-ENTMCNC: 30 G/DL (ref 31.5–36.5)
MCV RBC AUTO: 101 FL (ref 78–100)
MONOCYTES # BLD AUTO: 0.6 10E3/UL (ref 0–1.3)
MONOCYTES NFR BLD AUTO: 15 %
NEUTROPHILS # BLD AUTO: 2.4 10E3/UL (ref 1.6–8.3)
NEUTROPHILS NFR BLD AUTO: 59 %
NRBC # BLD AUTO: 0 10E3/UL
NRBC BLD AUTO-RTO: 0 /100
NT-PROBNP SERPL-MCNC: ABNORMAL PG/ML (ref 0–900)
O2/TOTAL GAS SETTING VFR VENT: 5 %
OXYHGB MFR BLDV: 73 % (ref 70–75)
P AXIS - MUSE: NORMAL DEGREES
PCO2 BLDV: 55 MM HG (ref 40–50)
PH BLDV: 7.29 [PH] (ref 7.32–7.43)
PLATELET # BLD AUTO: 148 10E3/UL (ref 150–450)
PO2 BLDV: 42 MM HG (ref 25–47)
POTASSIUM SERPL-SCNC: 4.7 MMOL/L (ref 3.4–5.3)
PR INTERVAL - MUSE: NORMAL MS
PROT SERPL-MCNC: 7.1 G/DL (ref 6.4–8.3)
QRS DURATION - MUSE: 84 MS
QT - MUSE: 438 MS
QTC - MUSE: 469 MS
R AXIS - MUSE: 88 DEGREES
RBC # BLD AUTO: 2.9 10E6/UL (ref 3.8–5.2)
RSV RNA SPEC NAA+PROBE: NEGATIVE
SAO2 % BLDV: 74.3 % (ref 70–75)
SARS-COV-2 RNA RESP QL NAA+PROBE: NEGATIVE
SODIUM SERPL-SCNC: 135 MMOL/L (ref 135–145)
SYSTOLIC BLOOD PRESSURE - MUSE: NORMAL MMHG
T AXIS - MUSE: 92 DEGREES
TROPONIN T SERPL HS-MCNC: 61 NG/L
TROPONIN T SERPL HS-MCNC: 62 NG/L
TROPONIN T SERPL HS-MCNC: 62 NG/L
VENTRICULAR RATE- MUSE: 69 BPM
WBC # BLD AUTO: 4 10E3/UL (ref 4–11)

## 2025-01-08 PROCEDURE — 250N000013 HC RX MED GY IP 250 OP 250 PS 637: Performed by: STUDENT IN AN ORGANIZED HEALTH CARE EDUCATION/TRAINING PROGRAM

## 2025-01-08 PROCEDURE — 84145 PROCALCITONIN (PCT): CPT | Performed by: NURSE PRACTITIONER

## 2025-01-08 PROCEDURE — 84484 ASSAY OF TROPONIN QUANT: CPT | Performed by: EMERGENCY MEDICINE

## 2025-01-08 PROCEDURE — 999N000157 HC STATISTIC RCP TIME EA 10 MIN

## 2025-01-08 PROCEDURE — G0463 HOSPITAL OUTPT CLINIC VISIT: HCPCS | Mod: 25

## 2025-01-08 PROCEDURE — 250N000012 HC RX MED GY IP 250 OP 636 PS 637: Performed by: STUDENT IN AN ORGANIZED HEALTH CARE EDUCATION/TRAINING PROGRAM

## 2025-01-08 PROCEDURE — 250N000011 HC RX IP 250 OP 636: Performed by: STUDENT IN AN ORGANIZED HEALTH CARE EDUCATION/TRAINING PROGRAM

## 2025-01-08 PROCEDURE — 85025 COMPLETE CBC W/AUTO DIFF WBC: CPT | Performed by: EMERGENCY MEDICINE

## 2025-01-08 PROCEDURE — 82310 ASSAY OF CALCIUM: CPT | Performed by: EMERGENCY MEDICINE

## 2025-01-08 PROCEDURE — 36415 COLL VENOUS BLD VENIPUNCTURE: CPT | Performed by: STUDENT IN AN ORGANIZED HEALTH CARE EDUCATION/TRAINING PROGRAM

## 2025-01-08 PROCEDURE — 93005 ELECTROCARDIOGRAM TRACING: CPT

## 2025-01-08 PROCEDURE — 250N000009 HC RX 250: Performed by: EMERGENCY MEDICINE

## 2025-01-08 PROCEDURE — 87340 HEPATITIS B SURFACE AG IA: CPT | Performed by: INTERNAL MEDICINE

## 2025-01-08 PROCEDURE — 250N000009 HC RX 250: Performed by: STUDENT IN AN ORGANIZED HEALTH CARE EDUCATION/TRAINING PROGRAM

## 2025-01-08 PROCEDURE — 87637 SARSCOV2&INF A&B&RSV AMP PRB: CPT | Performed by: EMERGENCY MEDICINE

## 2025-01-08 PROCEDURE — 82247 BILIRUBIN TOTAL: CPT | Performed by: EMERGENCY MEDICINE

## 2025-01-08 PROCEDURE — 94640 AIRWAY INHALATION TREATMENT: CPT

## 2025-01-08 PROCEDURE — 272N000272 HC CONTINUOUS NEBULIZER MICRO PUMP

## 2025-01-08 PROCEDURE — 71045 X-RAY EXAM CHEST 1 VIEW: CPT

## 2025-01-08 PROCEDURE — 36415 COLL VENOUS BLD VENIPUNCTURE: CPT | Performed by: EMERGENCY MEDICINE

## 2025-01-08 PROCEDURE — 83605 ASSAY OF LACTIC ACID: CPT | Performed by: EMERGENCY MEDICINE

## 2025-01-08 PROCEDURE — 99223 1ST HOSP IP/OBS HIGH 75: CPT | Performed by: STUDENT IN AN ORGANIZED HEALTH CARE EDUCATION/TRAINING PROGRAM

## 2025-01-08 PROCEDURE — 120N000001 HC R&B MED SURG/OB

## 2025-01-08 PROCEDURE — 82805 BLOOD GASES W/O2 SATURATION: CPT | Performed by: STUDENT IN AN ORGANIZED HEALTH CARE EDUCATION/TRAINING PROGRAM

## 2025-01-08 PROCEDURE — 84484 ASSAY OF TROPONIN QUANT: CPT | Performed by: STUDENT IN AN ORGANIZED HEALTH CARE EDUCATION/TRAINING PROGRAM

## 2025-01-08 PROCEDURE — 83880 ASSAY OF NATRIURETIC PEPTIDE: CPT | Performed by: EMERGENCY MEDICINE

## 2025-01-08 PROCEDURE — 87040 BLOOD CULTURE FOR BACTERIA: CPT | Performed by: EMERGENCY MEDICINE

## 2025-01-08 PROCEDURE — 82962 GLUCOSE BLOOD TEST: CPT

## 2025-01-08 PROCEDURE — 99291 CRITICAL CARE FIRST HOUR: CPT | Mod: 25

## 2025-01-08 RX ORDER — FUROSEMIDE 40 MG/1
40 TABLET ORAL DAILY
Status: DISCONTINUED | OUTPATIENT
Start: 2025-01-08 | End: 2025-01-08

## 2025-01-08 RX ORDER — OSELTAMIVIR PHOSPHATE 30 MG/1
30 CAPSULE ORAL ONCE
Status: DISCONTINUED | OUTPATIENT
Start: 2025-01-09 | End: 2025-01-09

## 2025-01-08 RX ORDER — SEVELAMER CARBONATE 800 MG/1
800 TABLET, FILM COATED ORAL
Status: DISCONTINUED | OUTPATIENT
Start: 2025-01-08 | End: 2025-01-09

## 2025-01-08 RX ORDER — FUROSEMIDE 20 MG/1
40 TABLET ORAL
Status: DISCONTINUED | OUTPATIENT
Start: 2025-01-09 | End: 2025-01-19

## 2025-01-08 RX ORDER — HEPARIN SODIUM 5000 [USP'U]/.5ML
5000 INJECTION, SOLUTION INTRAVENOUS; SUBCUTANEOUS EVERY 8 HOURS
Status: DISCONTINUED | OUTPATIENT
Start: 2025-01-08 | End: 2025-01-16

## 2025-01-08 RX ORDER — OSELTAMIVIR PHOSPHATE 30 MG/1
30 CAPSULE ORAL ONCE
Status: COMPLETED | OUTPATIENT
Start: 2025-01-08 | End: 2025-01-08

## 2025-01-08 RX ORDER — TACROLIMUS 1 MG/G
OINTMENT TOPICAL 2 TIMES DAILY
Status: DISCONTINUED | OUTPATIENT
Start: 2025-01-08 | End: 2025-01-25

## 2025-01-08 RX ORDER — ACETAMINOPHEN 650 MG/1
650 SUPPOSITORY RECTAL EVERY 4 HOURS PRN
Status: DISCONTINUED | OUTPATIENT
Start: 2025-01-08 | End: 2025-01-25

## 2025-01-08 RX ORDER — FUROSEMIDE 80 MG/1
80 TABLET ORAL 2 TIMES DAILY
Status: DISCONTINUED | OUTPATIENT
Start: 2025-01-08 | End: 2025-01-08

## 2025-01-08 RX ORDER — GABAPENTIN 100 MG/1
100 CAPSULE ORAL EVERY MORNING
Status: DISCONTINUED | OUTPATIENT
Start: 2025-01-08 | End: 2025-01-09

## 2025-01-08 RX ORDER — CHOLECALCIFEROL (VITAMIN D3) 50 MCG
50 TABLET ORAL DAILY
Status: DISCONTINUED | OUTPATIENT
Start: 2025-01-08 | End: 2025-01-09

## 2025-01-08 RX ORDER — AMOXICILLIN 250 MG
1 CAPSULE ORAL 2 TIMES DAILY PRN
Status: DISCONTINUED | OUTPATIENT
Start: 2025-01-08 | End: 2025-01-25

## 2025-01-08 RX ORDER — FUROSEMIDE 20 MG/1
80 TABLET ORAL
Status: DISCONTINUED | OUTPATIENT
Start: 2025-01-09 | End: 2025-01-19

## 2025-01-08 RX ORDER — CALCITRIOL 0.5 UG/1
0.5 CAPSULE, LIQUID FILLED ORAL
COMMUNITY
End: 2025-03-02

## 2025-01-08 RX ORDER — CALCIUM CARBONATE 500 MG/1
1000 TABLET, CHEWABLE ORAL 4 TIMES DAILY PRN
Status: DISCONTINUED | OUTPATIENT
Start: 2025-01-08 | End: 2025-01-25

## 2025-01-08 RX ORDER — IPRATROPIUM BROMIDE AND ALBUTEROL SULFATE 2.5; .5 MG/3ML; MG/3ML
3 SOLUTION RESPIRATORY (INHALATION)
Status: DISCONTINUED | OUTPATIENT
Start: 2025-01-08 | End: 2025-01-25

## 2025-01-08 RX ORDER — IPRATROPIUM BROMIDE AND ALBUTEROL SULFATE 2.5; .5 MG/3ML; MG/3ML
SOLUTION RESPIRATORY (INHALATION)
Status: COMPLETED
Start: 2025-01-08 | End: 2025-01-08

## 2025-01-08 RX ORDER — CARVEDILOL 25 MG/1
25 TABLET ORAL 2 TIMES DAILY WITH MEALS
Status: DISCONTINUED | OUTPATIENT
Start: 2025-01-08 | End: 2025-01-19

## 2025-01-08 RX ORDER — DEXTROSE MONOHYDRATE 25 G/50ML
25-50 INJECTION, SOLUTION INTRAVENOUS
Status: DISCONTINUED | OUTPATIENT
Start: 2025-01-08 | End: 2025-01-17 | Stop reason: ALTCHOICE

## 2025-01-08 RX ORDER — METHOXY POLYETHYLENE GLYCOL-EPOETIN BETA 30 UG/.3ML
30 INJECTION, SOLUTION INTRAVENOUS
COMMUNITY
End: 2025-03-02

## 2025-01-08 RX ORDER — OSELTAMIVIR PHOSPHATE 30 MG/1
30 CAPSULE ORAL ONCE
Status: DISCONTINUED | OUTPATIENT
Start: 2025-01-09 | End: 2025-01-08

## 2025-01-08 RX ORDER — LIDOCAINE 40 MG/G
CREAM TOPICAL
Status: DISCONTINUED | OUTPATIENT
Start: 2025-01-08 | End: 2025-01-25

## 2025-01-08 RX ORDER — ALBUTEROL SULFATE 90 UG/1
2 INHALANT RESPIRATORY (INHALATION) EVERY 6 HOURS PRN
Status: DISCONTINUED | OUTPATIENT
Start: 2025-01-08 | End: 2025-01-25

## 2025-01-08 RX ORDER — OLOPATADINE HYDROCHLORIDE 1 MG/ML
1 SOLUTION/ DROPS OPHTHALMIC 2 TIMES DAILY PRN
Status: ON HOLD | COMMUNITY

## 2025-01-08 RX ORDER — IPRATROPIUM BROMIDE AND ALBUTEROL SULFATE 2.5; .5 MG/3ML; MG/3ML
3 SOLUTION RESPIRATORY (INHALATION) EVERY 10 MIN PRN
Status: DISCONTINUED | OUTPATIENT
Start: 2025-01-08 | End: 2025-01-09

## 2025-01-08 RX ORDER — ATORVASTATIN CALCIUM 10 MG/1
20 TABLET, FILM COATED ORAL EVERY EVENING
Status: DISCONTINUED | OUTPATIENT
Start: 2025-01-08 | End: 2025-01-09

## 2025-01-08 RX ORDER — ONDANSETRON 2 MG/ML
4 INJECTION INTRAMUSCULAR; INTRAVENOUS EVERY 6 HOURS PRN
Status: DISCONTINUED | OUTPATIENT
Start: 2025-01-08 | End: 2025-01-25

## 2025-01-08 RX ORDER — AMLODIPINE BESYLATE 5 MG/1
5 TABLET ORAL 2 TIMES DAILY
Status: DISCONTINUED | OUTPATIENT
Start: 2025-01-08 | End: 2025-01-19

## 2025-01-08 RX ORDER — AMOXICILLIN 250 MG
2 CAPSULE ORAL 2 TIMES DAILY PRN
Status: DISCONTINUED | OUTPATIENT
Start: 2025-01-08 | End: 2025-01-25

## 2025-01-08 RX ORDER — ACETAMINOPHEN 325 MG/1
650 TABLET ORAL EVERY 4 HOURS PRN
Status: DISCONTINUED | OUTPATIENT
Start: 2025-01-08 | End: 2025-01-08

## 2025-01-08 RX ORDER — CETIRIZINE HYDROCHLORIDE 10 MG/1
10 TABLET ORAL AT BEDTIME
Status: ON HOLD | COMMUNITY

## 2025-01-08 RX ORDER — OSELTAMIVIR PHOSPHATE 30 MG/1
30 CAPSULE ORAL ONCE
Status: DISCONTINUED | OUTPATIENT
Start: 2025-01-11 | End: 2025-01-08

## 2025-01-08 RX ORDER — OSELTAMIVIR PHOSPHATE 30 MG/1
30 CAPSULE ORAL ONCE
Status: DISCONTINUED | OUTPATIENT
Start: 2025-01-11 | End: 2025-01-09

## 2025-01-08 RX ORDER — FUROSEMIDE 20 MG/1
80 TABLET ORAL
Status: DISCONTINUED | OUTPATIENT
Start: 2025-01-08 | End: 2025-01-14

## 2025-01-08 RX ORDER — NICOTINE POLACRILEX 4 MG
15-30 LOZENGE BUCCAL
Status: DISCONTINUED | OUTPATIENT
Start: 2025-01-08 | End: 2025-01-17 | Stop reason: ALTCHOICE

## 2025-01-08 RX ORDER — ONDANSETRON 4 MG/1
4 TABLET, ORALLY DISINTEGRATING ORAL EVERY 6 HOURS PRN
Status: DISCONTINUED | OUTPATIENT
Start: 2025-01-08 | End: 2025-01-25

## 2025-01-08 RX ORDER — CETIRIZINE HYDROCHLORIDE 5 MG/1
5 TABLET ORAL AT BEDTIME
Status: DISCONTINUED | OUTPATIENT
Start: 2025-01-08 | End: 2025-01-09

## 2025-01-08 RX ORDER — BENZONATATE 100 MG/1
100 CAPSULE ORAL 3 TIMES DAILY PRN
Status: DISCONTINUED | OUTPATIENT
Start: 2025-01-08 | End: 2025-01-25

## 2025-01-08 RX ORDER — ACETAMINOPHEN 325 MG/1
650 TABLET ORAL EVERY 4 HOURS PRN
Status: DISCONTINUED | OUTPATIENT
Start: 2025-01-08 | End: 2025-01-25

## 2025-01-08 RX ADMIN — IPRATROPIUM BROMIDE AND ALBUTEROL SULFATE 3 ML: .5; 3 SOLUTION RESPIRATORY (INHALATION) at 17:14

## 2025-01-08 RX ADMIN — CARVEDILOL 25 MG: 25 TABLET, FILM COATED ORAL at 19:05

## 2025-01-08 RX ADMIN — SEVELAMER CARBONATE 800 MG: 800 TABLET, FILM COATED ORAL at 19:05

## 2025-01-08 RX ADMIN — CETIRIZINE HYDROCHLORIDE 5 MG: 5 TABLET ORAL at 22:11

## 2025-01-08 RX ADMIN — OSELTAMIVIR PHOSPHATE 30 MG: 30 CAPSULE ORAL at 17:13

## 2025-01-08 RX ADMIN — HEPARIN SODIUM 5000 UNITS: 5000 INJECTION, SOLUTION INTRAVENOUS; SUBCUTANEOUS at 22:10

## 2025-01-08 RX ADMIN — Medication 50 MCG: at 16:13

## 2025-01-08 RX ADMIN — SERTRALINE HYDROCHLORIDE 75 MG: 50 TABLET ORAL at 22:10

## 2025-01-08 RX ADMIN — GABAPENTIN 100 MG: 100 CAPSULE ORAL at 17:13

## 2025-01-08 RX ADMIN — INSULIN GLARGINE 12 UNITS: 100 INJECTION, SOLUTION SUBCUTANEOUS at 22:12

## 2025-01-08 RX ADMIN — IPRATROPIUM BROMIDE AND ALBUTEROL SULFATE 3 ML: 2.5; .5 SOLUTION RESPIRATORY (INHALATION) at 07:56

## 2025-01-08 RX ADMIN — BENZONATATE 100 MG: 100 CAPSULE ORAL at 23:58

## 2025-01-08 RX ADMIN — ATORVASTATIN CALCIUM 20 MG: 20 TABLET, FILM COATED ORAL at 22:10

## 2025-01-08 RX ADMIN — IPRATROPIUM BROMIDE AND ALBUTEROL SULFATE 3 ML: .5; 3 SOLUTION RESPIRATORY (INHALATION) at 07:56

## 2025-01-08 RX ADMIN — AMLODIPINE BESYLATE 5 MG: 5 TABLET ORAL at 22:17

## 2025-01-08 RX ADMIN — HEPARIN SODIUM 5000 UNITS: 5000 INJECTION, SOLUTION INTRAVENOUS; SUBCUTANEOUS at 16:14

## 2025-01-08 ASSESSMENT — ACTIVITIES OF DAILY LIVING (ADL)
ADLS_ACUITY_SCORE: 57
ADLS_ACUITY_SCORE: 56
ADLS_ACUITY_SCORE: 56
ADLS_ACUITY_SCORE: 58
ADLS_ACUITY_SCORE: 57
ADLS_ACUITY_SCORE: 58
ADLS_ACUITY_SCORE: 57
ADLS_ACUITY_SCORE: 57
ADLS_ACUITY_SCORE: 58
ADLS_ACUITY_SCORE: 56
ADLS_ACUITY_SCORE: 58
ADLS_ACUITY_SCORE: 57

## 2025-01-08 ASSESSMENT — COLUMBIA-SUICIDE SEVERITY RATING SCALE - C-SSRS
1. IN THE PAST MONTH, HAVE YOU WISHED YOU WERE DEAD OR WISHED YOU COULD GO TO SLEEP AND NOT WAKE UP?: NO
6. HAVE YOU EVER DONE ANYTHING, STARTED TO DO ANYTHING, OR PREPARED TO DO ANYTHING TO END YOUR LIFE?: NO
2. HAVE YOU ACTUALLY HAD ANY THOUGHTS OF KILLING YOURSELF IN THE PAST MONTH?: NO

## 2025-01-08 NOTE — PHARMACY-ADMISSION MEDICATION HISTORY
Pharmacist Admission Medication History    Admission medication history is complete. The information provided in this note is only as accurate as the sources available at the time of the update.    Information Source(s): Family member, CareEverywhere/SureScripts, and [facility]  via phone    Pertinent Information: Talked to daughter Caroline Ziegler. She is unsure when the patient last took insulin, but it has been likely a few days.    Changes made to PTA medication list:  Added: zyrtec, pataday, mircera, calcitriol  Deleted: cleocin, bactrim, veonfer  Changed: tacrolimus, tac, gabapentin to qam, lasix dosing to make clearer, cinacalcet to three times weekly  Marked as not taking: protonix, silvadene    Allergies reviewed with patient and updates made in EHR: yes    Medication History Completed By: David Agrawal RPH 1/8/2025 1:47 PM    PTA Med List   Medication Sig Note Last Dose/Taking    acetaminophen (TYLENOL) 325 MG tablet Take 2 tablets (650 mg) by mouth every 4 hours as needed for mild pain  1/8/2025    albuterol (PROAIR HFA/PROVENTIL HFA/VENTOLIN HFA) 108 (90 Base) MCG/ACT inhaler Inhale 2 puffs into the lungs every 6 hours as needed for shortness of breath / dyspnea or wheezing  Unknown    amLODIPine (NORVASC) 5 MG tablet Take 1 tablet (5 mg) by mouth 2 times daily.  1/7/2025 Bedtime    atorvastatin (LIPITOR) 20 MG tablet Take 1 tablet (20 mg) by mouth every evening.  1/7/2025 Evening    calcitRIOL (ROCALTROL) 0.5 MCG capsule Take 0.5 mcg by mouth three times a week. Given at dialysis  1/7/2025    carvedilol (COREG) 25 MG tablet Take 1 tablet (25 mg) by mouth 2 times daily (with meals)  1/7/2025    cetirizine (ZYRTEC) 10 MG tablet Take 10 mg by mouth at bedtime.  1/7/2025 Evening    cinacalcet (SENSIPAR) 30 MG tablet Take 30 mg by mouth three times a week. Given at dialysis three times a week  1/7/2025    furosemide (LASIX) 40 MG tablet Take 1 tablet (40 mg) by mouth daily And take one tablet of 80mg to make  total of 120mg twice a day (Patient taking differently: Take by mouth. 40mg in the morning and 80mg in the evening on dialysis days)  1/7/2025 Evening    furosemide (LASIX) 80 MG tablet Take 1 tablet (80 mg) by mouth 2 times daily (Patient taking differently: Take by mouth. 80 mg twice daily on non dialysis days)  1/6/2025 Evening    gabapentin (NEURONTIN) 100 MG capsule Take 1 capsule (100 mg) by mouth 3 times daily as needed for neuropathic pain (Patient taking differently: Take 100 mg by mouth every morning.)  1/7/2025 Morning    insulin aspart (NOVOLOG FLEXPEN) 100 UNIT/ML pen Inject subcu 5 units with breakfast, lunch and dinner. Approx daily dose 15 units.  Past Week    insulin glargine (BASAGLAR KWIKPEN) 100 UNIT/ML pen INJECT 18 UNITS SUBCUTANEOUS DAILY.  Past Week    methoxy PEG-Epoetin Beta (MIRCERA) 30 MCG/0.3ML SOSY injectable syringe 30 mcg every 14 days. Given at dialysis  12/30/2024    miconazole (MICATIN) 2 % external powder Apply topically 2 times daily as needed (redness under breasts/groin) Apply twice daily to skin folds as needed  Unknown    olopatadine (PATANOL) 0.1 % ophthalmic solution Place 1 drop into both eyes 2 times daily as needed for allergies.  Unknown    sertraline (ZOLOFT) 25 MG tablet TAKE 1  TABLET BY MOUTH EVERY DAY along with a 50 mg tablet for a total of 75 mg  1/7/2025 Evening    sertraline (ZOLOFT) 50 MG tablet Take 1 tablet (50 mg) by mouth at bedtime  1/7/2025 Evening    sevelamer carbonate (RENVELA) 800 MG tablet Take two with meals and one with snacks  1/7/2025 Evening    tacrolimus (PROTOPIC) 0.1 % external ointment Apply topically 2 times daily. To skin folds 1/8/2025: Uses on the weekdays per daughter Past Week    triamcinolone (KENALOG) 0.025 % external ointment Apply topically 2 times daily. To rash under breasts and groin as needed 1/8/2025: Uses on the weekends per daughter Past Week    Vitamin D3 50 mcg (2000 units) tablet TAKE ONE TABLET BY MOUTH ONCE DAILY  1/8/2025: Gets in the evening 1/7/2025 Evening

## 2025-01-08 NOTE — PROGRESS NOTES
PT came in via EMS was on BIPAP at arrival, switched to Hospital BIPAP.  Decreased FIO2 and was eventually able to ween off BIPAP to 3L NC.  PT is unsure what her normal flow of O2 at home is or where her SPO2 is for baseline.

## 2025-01-08 NOTE — ED TRIAGE NOTES
Patient has been exposed to whole family with flu lately, having worsening SOB with confusion. EMS reports patient had oxygen saturation of 60s on RA. History of CHF and home oxygen use but forgot to have oxygen on this morning. ESRD on dialysis.      Triage Assessment (Adult)       Row Name 01/08/25 0734          Triage Assessment    Airway WDL WDL        Respiratory WDL    Respiratory WDL X        Skin Circulation/Temperature WDL    Skin Circulation/Temperature WDL X        Cardiac WDL    Cardiac WDL WDL        Peripheral/Neurovascular WDL    Peripheral Neurovascular WDL WDL        Cognitive/Neuro/Behavioral WDL    Cognitive/Neuro/Behavioral WDL WDL
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
## 2025-01-08 NOTE — DISCHARGE INSTRUCTIONS
Left buttocks wound: Every 3 days and prn if soiled or damaged  Cleanse with wound cleanser and pat dry with gauze  Apply Xeroform to open area on left buttocks  Apply Sacral Mepilex  Encourage patient to turn at least every two hours, assist as needed to use pillows for repositioning  Use Koko Chair cushion when up in chair or wheelchair    "us.    _________________________________________________________________________________________  [employer] [facility]   Web: [email or web address]  Phone: [phone]          Other resources:   call to reach someone who can connect you with more resources!   _________________________________________________________________________________________                _________________________________________________________________________________________    _________________________________________________________________________________________    If your family would like extra support, here is one great resource:   \"Care[employer]\" call [phone]-[facility](3475)  Customer service hours: Monday - Saturday, 9 a.m. - 7 p.m.              "
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
## 2025-01-08 NOTE — ED NOTES
Bed: ST03  Expected date:   Expected time:   Means of arrival:   Comments:  Hems [address] 74F resp distress

## 2025-01-08 NOTE — CONSULTS
"[facility] Nurse Inpatient Assessment     Consulted for: \"coccyx\"    Summary: POA area of dry scaly skin with superficial ulcerations to left buttock. POA scar tissue and blanchable redness noted to bilateral buttocks with dark discoloration to sacral area when patient was laying on her right side. Incidental finding of large flat scar tissue to perineal area and medial/posterior thighs.    Patient History (according to provider note(s):      Supriya Herr is a 74 year old female with PMHx significant for ESRD on HD, CHF, COPD, DM type II, hypertension.  She presented to the ER via ambulance complaining of shortness of breath.  Daughter currently ill with influenza.  Patient tested positive for influenza in the ER.  She is admitted for further care management.     Assessment:      Areas visualized during today's visit: Focused:      Left buttocks and sacral area with patient laying on her right side      Sacral area with patient sitting upright      Sacral area/top of buttocks with patient sitting upright      Left buttocks & gluteal crease      Perineal area & bilateral thighs    Last photo: 1/8    Wound history/plan of care: Patient states that left buttocks wound is d/t her sliding across her wheelchair. She does report having a wheelchair cushion that she normally uses at home, but states that it needs to be reinflated. Will order a Koko Chair cushion. Patient has been using Vaseline on her buttocks wound and states that this helps her to not have pain in this area at night. Because this area is so dry, we will use Xeroform and then cover it with a Sacral Mepilex border to protect the surrounding skin. Patient has difficulty laying on her side, although she is aware that she needs to be repositioning at least every 2 hours. She is agreeable to repositioning using pillows for pressure relief.     Patient also states that she slid down out of her wheelchair about a month ago, and " she thinks the discoloration to her sacral area could possibly be due to this.This discoloration was only visible in the first picture taken by [facility] today. Patient's ED nurse assisted with assessment of this area after that picture was taken, and we were unable to visualize this discoloration and it does not appear in subsequent pictures of the same area. There is a lighter pale purplish discoloration to the gluteal crease, but no other dark discoloration was noted.    Patient does have old scar tissue to her perineal area and bilateral thighs. This tissue is pale, smooth, and intact. Patient thinks that these scars were also caused by her sliding around in her wheelchair.    Wound locations: Left buttock  Wound due to: Friction  Wound base: 80% Epidermis, Scar tissue, and Bumpy , 20% Dry and Pink     Palpation of the wound bed: normal      Drainage: none     Description of drainage: none     Measurements (length x width x depth, in cm): ~4.5 x 2 cm      Tunneling: N/A     Undermining: N/A  Periwound skin: Intact and Scar tissue      Color: normal and consistent with surrounding tissue and pink      Temperature: normal   Odor: none  Pain: denies  and no grimacing or signs of discomfort, none  Pain interventions prior to dressing change: patient tolerated well and slow and gentle cares   Treatment goal: Heal , Infection control/prevention, and Protection  STATUS: initial assessment  Supplies ordered: gathered, at bedside, supplies stored on unit, discussed with RN, and discussed with patient        Treatment Plan:     Left buttocks wound(s): Every 3 days and prn if soiled or damaged  Cleanse with wound cleanser and pat dry with gauze  Apply Xeroform to open area on left buttocks  Apply Sacral Mepilex  Encourage patient to turn at least every two hours, assist as needed to use pillows for repositioning  Use Koko Chair cushion when up in chair or wheelchair     Orders: Written    RECOMMEND PRIMARY TEAM ORDER: None, at  this time  Education provided: importance of repositioning, plan of care, wound progress, Infection prevention , Moisture management, Hygiene, and Off-loading pressure  Discussed plan of care with: Patient and Nurse  WOC nurse follow-up plan: weekly  Notify WOC if wound(s) deteriorate.  Nursing to notify the Provider(s) and re-consult the WOC Nurse if new skin concern.    DATA:     Current support surface: patient seen on ED cart  Containment of urine/stool: Continent of bladder and Continent of bowel  BMI: Body mass index is 43.66 kg/m .   Active diet order: Orders Placed This Encounter      Renal Diet (dialysis)       Output: No intake/output data recorded.     Labs:   Recent Labs   Lab 01/08/25  0742   ALBUMIN 3.6   HGB 8.8*   WBC 4.0     Jennifer Carney RN CWCN  Pager no longer is use, please contact through [employer] group: WOC Nurse Eva

## 2025-01-08 NOTE — ED NOTES
[facility]  ED Nurse Handoff Report    ED Chief complaint: Shortness of Breath and Altered Mental Status      ED Diagnosis:   Final diagnoses:   Acute and chronic respiratory failure with hypoxia (H)   Influenza A   ESRD (end stage renal disease) on dialysis (H)   Anemia, unspecified type   Elevated troponin   Abnormal chest x-ray       Code Status: see signed and held    Allergies:   Allergies   Allergen Reactions    Ampicillin-Sulbactam Sodium Rash     No evidence SJS, but very uncomfortable and precipitated multiple provider visits. Would not use penicillins again if other options available.     Penicillins Rash       Patient Story: Patient has been exposed to whole family with flu lately, having worsening SOB with confusion. EMS reports patient had oxygen saturation of 60s on RA. History of CHF and home oxygen use but forgot to have oxygen on this morning. ESRD on dialysis   Focused Assessment:   A/O x 4, forgetful. SOB with exertion. Pain on butt. Scab on left buttock. BKA. VSS. Tolerating dialysis diet. Aneuric. WC bound.     Treatments and/or interventions provided: Neb, IV, labs, O2  Patient's response to treatments and/or interventions: improved    To be done/followed up on inpatient unit:  see signed and held    Does this patient have any cognitive concerns?: Forgetful    Activity level - Baseline/Home:  wheelchair  Activity Level - Current:   Unknown    Patient's Preferred language: English   Needed?: No    Isolation: None and Droplet  Infection: Not Applicable  Patient tested for COVID 19 prior to admission: YES  Bariatric?: No    Vital Signs:   Vitals:    01/08/25 1030 01/08/25 1130 01/08/25 1400 01/08/25 1600   BP: 135/67 (!) 110/94 139/58 138/48   Pulse: 64 62 66 65   Resp: 19 22 (!) 31 19   Temp:       SpO2: 99% 95% 94% 90%   Weight:       Height:           Cardiac Rhythm:     Was the PSS-3 completed:   Yes  What interventions are required if any?               Family  Comments: daughter - has flu  OBS brochure/video discussed/provided to patient/family: N/A                  For the majority of the shift this patient's behavior was Green.   Behavioral interventions performed were NA.    ED NURSE PHONE NUMBER: [phone]

## 2025-01-09 ENCOUNTER — APPOINTMENT (OUTPATIENT)
Dept: GENERAL RADIOLOGY | Facility: CLINIC | Age: 75
DRG: 207 | End: 2025-01-09
Attending: HOSPITALIST
Payer: COMMERCIAL

## 2025-01-09 ENCOUNTER — APPOINTMENT (OUTPATIENT)
Dept: ULTRASOUND IMAGING | Facility: CLINIC | Age: 75
DRG: 207 | End: 2025-01-09
Attending: HOSPITALIST
Payer: COMMERCIAL

## 2025-01-09 ENCOUNTER — ANESTHESIA EVENT (OUTPATIENT)
Dept: MEDSURG UNIT | Facility: CLINIC | Age: 75
End: 2025-01-09
Payer: COMMERCIAL

## 2025-01-09 ENCOUNTER — ANESTHESIA (OUTPATIENT)
Dept: MEDSURG UNIT | Facility: CLINIC | Age: 75
End: 2025-01-09
Payer: COMMERCIAL

## 2025-01-09 VITALS
SYSTOLIC BLOOD PRESSURE: 134 MMHG | HEART RATE: 62 BPM | BODY MASS INDEX: 33.32 KG/M2 | OXYGEN SATURATION: 97 % | RESPIRATION RATE: 16 BRPM | WEIGHT: 212.3 LBS | HEIGHT: 67 IN | DIASTOLIC BLOOD PRESSURE: 51 MMHG | TEMPERATURE: 97.8 F

## 2025-01-09 LAB
ANION GAP SERPL CALCULATED.3IONS-SCNC: 12 MMOL/L (ref 7–15)
ATRIAL RATE - MUSE: 71 BPM
BACTERIA BLD CULT: NORMAL
BACTERIA BLD CULT: NORMAL
BASE EXCESS BLDV CALC-SCNC: -1.1 MMOL/L (ref -3–3)
BUN SERPL-MCNC: 47.4 MG/DL (ref 8–23)
CALCIUM SERPL-MCNC: 8.8 MG/DL (ref 8.8–10.4)
CHLORIDE SERPL-SCNC: 100 MMOL/L (ref 98–107)
CREAT SERPL-MCNC: 6.61 MG/DL (ref 0.51–0.95)
DIASTOLIC BLOOD PRESSURE - MUSE: NORMAL MMHG
EGFRCR SERPLBLD CKD-EPI 2021: 6 ML/MIN/1.73M2
ERYTHROCYTE [DISTWIDTH] IN BLOOD BY AUTOMATED COUNT: 14.2 % (ref 10–15)
GLUCOSE BLDC GLUCOMTR-MCNC: 102 MG/DL (ref 70–99)
GLUCOSE BLDC GLUCOMTR-MCNC: 102 MG/DL (ref 70–99)
GLUCOSE BLDC GLUCOMTR-MCNC: 107 MG/DL (ref 70–99)
GLUCOSE BLDC GLUCOMTR-MCNC: 112 MG/DL (ref 70–99)
GLUCOSE BLDC GLUCOMTR-MCNC: 123 MG/DL (ref 70–99)
GLUCOSE BLDC GLUCOMTR-MCNC: 57 MG/DL (ref 70–99)
GLUCOSE BLDC GLUCOMTR-MCNC: 72 MG/DL (ref 70–99)
GLUCOSE SERPL-MCNC: 102 MG/DL (ref 70–99)
HBV SURFACE AB SERPL IA-ACNC: <3.5 M[IU]/ML
HBV SURFACE AB SERPL IA-ACNC: NONREACTIVE M[IU]/ML
HCO3 BLDV-SCNC: 23 MMOL/L (ref 21–28)
HCO3 SERPL-SCNC: 22 MMOL/L (ref 22–29)
HCT VFR BLD AUTO: 28.2 % (ref 35–47)
HGB BLD-MCNC: 8.6 G/DL (ref 11.7–15.7)
INTERPRETATION ECG - MUSE: NORMAL
MCH RBC QN AUTO: 30.5 PG (ref 26.5–33)
MCHC RBC AUTO-ENTMCNC: 30.5 G/DL (ref 31.5–36.5)
MCV RBC AUTO: 100 FL (ref 78–100)
MRSA DNA SPEC QL NAA+PROBE: NEGATIVE
O2/TOTAL GAS SETTING VFR VENT: 30 %
OXYHGB MFR BLDV: 86 % (ref 70–75)
P AXIS - MUSE: 62 DEGREES
PCO2 BLDV: 35 MM HG (ref 40–50)
PH BLDV: 7.43 [PH] (ref 7.32–7.43)
PLATELET # BLD AUTO: 135 10E3/UL (ref 150–450)
PO2 BLDV: 49 MM HG (ref 25–47)
POTASSIUM SERPL-SCNC: 5.2 MMOL/L (ref 3.4–5.3)
PR INTERVAL - MUSE: 134 MS
PROCALCITONIN SERPL IA-MCNC: 0.39 NG/ML
QRS DURATION - MUSE: 94 MS
QT - MUSE: 422 MS
QTC - MUSE: 458 MS
R AXIS - MUSE: 89 DEGREES
RBC # BLD AUTO: 2.82 10E6/UL (ref 3.8–5.2)
SA TARGET DNA: NEGATIVE
SAO2 % BLDV: 87.8 % (ref 70–75)
SODIUM SERPL-SCNC: 134 MMOL/L (ref 135–145)
SYSTOLIC BLOOD PRESSURE - MUSE: NORMAL MMHG
T AXIS - MUSE: 71 DEGREES
TROPONIN T SERPL HS-MCNC: 63 NG/L
VENTRICULAR RATE- MUSE: 71 BPM
WBC # BLD AUTO: 5.8 10E3/UL (ref 4–11)

## 2025-01-09 PROCEDURE — 250N000011 HC RX IP 250 OP 636: Performed by: INTERNAL MEDICINE

## 2025-01-09 PROCEDURE — 82805 BLOOD GASES W/O2 SATURATION: CPT | Performed by: INTERNAL MEDICINE

## 2025-01-09 PROCEDURE — 999N000040 HC STATISTIC CONSULT NO CHARGE VASC ACCESS

## 2025-01-09 PROCEDURE — 250N000011 HC RX IP 250 OP 636: Performed by: STUDENT IN AN ORGANIZED HEALTH CARE EDUCATION/TRAINING PROGRAM

## 2025-01-09 PROCEDURE — 86706 HEP B SURFACE ANTIBODY: CPT | Performed by: INTERNAL MEDICINE

## 2025-01-09 PROCEDURE — 272N000272 HC CONTINUOUS NEBULIZER MICRO PUMP

## 2025-01-09 PROCEDURE — 31500 INSERT EMERGENCY AIRWAY: CPT | Mod: 59 | Performed by: NURSE ANESTHETIST, CERTIFIED REGISTERED

## 2025-01-09 PROCEDURE — 999N000157 HC STATISTIC RCP TIME EA 10 MIN

## 2025-01-09 PROCEDURE — 85014 HEMATOCRIT: CPT | Performed by: STUDENT IN AN ORGANIZED HEALTH CARE EDUCATION/TRAINING PROGRAM

## 2025-01-09 PROCEDURE — 87205 SMEAR GRAM STAIN: CPT | Performed by: STUDENT IN AN ORGANIZED HEALTH CARE EDUCATION/TRAINING PROGRAM

## 2025-01-09 PROCEDURE — 90935 HEMODIALYSIS ONE EVALUATION: CPT

## 2025-01-09 PROCEDURE — 99291 CRITICAL CARE FIRST HOUR: CPT | Performed by: NURSE PRACTITIONER

## 2025-01-09 PROCEDURE — 999N000065 XR CHEST PORT 1 VIEW

## 2025-01-09 PROCEDURE — 200N000001 HC R&B ICU

## 2025-01-09 PROCEDURE — 99207 PR NO BILLABLE SERVICE THIS VISIT: CPT | Performed by: NURSE PRACTITIONER

## 2025-01-09 PROCEDURE — 5A1D70Z PERFORMANCE OF URINARY FILTRATION, INTERMITTENT, LESS THAN 6 HOURS PER DAY: ICD-10-PCS | Performed by: INTERNAL MEDICINE

## 2025-01-09 PROCEDURE — 999N000009 HC STATISTIC AIRWAY CARE

## 2025-01-09 PROCEDURE — 258N000003 HC RX IP 258 OP 636: Performed by: INTERNAL MEDICINE

## 2025-01-09 PROCEDURE — 82310 ASSAY OF CALCIUM: CPT | Performed by: STUDENT IN AN ORGANIZED HEALTH CARE EDUCATION/TRAINING PROGRAM

## 2025-01-09 PROCEDURE — 93970 EXTREMITY STUDY: CPT

## 2025-01-09 PROCEDURE — 99291 CRITICAL CARE FIRST HOUR: CPT | Mod: GC | Performed by: INTERNAL MEDICINE

## 2025-01-09 PROCEDURE — 90935 HEMODIALYSIS ONE EVALUATION: CPT | Performed by: INTERNAL MEDICINE

## 2025-01-09 PROCEDURE — 258N000001 HC RX 258: Performed by: STUDENT IN AN ORGANIZED HEALTH CARE EDUCATION/TRAINING PROGRAM

## 2025-01-09 PROCEDURE — 84484 ASSAY OF TROPONIN QUANT: CPT | Performed by: STUDENT IN AN ORGANIZED HEALTH CARE EDUCATION/TRAINING PROGRAM

## 2025-01-09 PROCEDURE — 5A1955Z RESPIRATORY VENTILATION, GREATER THAN 96 CONSECUTIVE HOURS: ICD-10-PCS | Performed by: NURSE PRACTITIONER

## 2025-01-09 PROCEDURE — 36415 COLL VENOUS BLD VENIPUNCTURE: CPT | Performed by: INTERNAL MEDICINE

## 2025-01-09 PROCEDURE — 93010 ELECTROCARDIOGRAM REPORT: CPT | Performed by: INTERNAL MEDICINE

## 2025-01-09 PROCEDURE — 999N000158 HC STATISTIC RCP TIME ED VENT EA 10 MIN

## 2025-01-09 PROCEDURE — 999N000185 HC STATISTIC TRANSPORT TIME EA 15 MIN

## 2025-01-09 PROCEDURE — 99207 PR NO BILLABLE SERVICE THIS VISIT: CPT | Performed by: INTERNAL MEDICINE

## 2025-01-09 PROCEDURE — 87641 MR-STAPH DNA AMP PROBE: CPT | Performed by: STUDENT IN AN ORGANIZED HEALTH CARE EDUCATION/TRAINING PROGRAM

## 2025-01-09 PROCEDURE — 94002 VENT MGMT INPAT INIT DAY: CPT

## 2025-01-09 PROCEDURE — 250N000009 HC RX 250: Performed by: STUDENT IN AN ORGANIZED HEALTH CARE EDUCATION/TRAINING PROGRAM

## 2025-01-09 PROCEDURE — 250N000009 HC RX 250: Performed by: INTERNAL MEDICINE

## 2025-01-09 PROCEDURE — 87640 STAPH A DNA AMP PROBE: CPT | Performed by: STUDENT IN AN ORGANIZED HEALTH CARE EDUCATION/TRAINING PROGRAM

## 2025-01-09 PROCEDURE — 80048 BASIC METABOLIC PNL TOTAL CA: CPT | Performed by: STUDENT IN AN ORGANIZED HEALTH CARE EDUCATION/TRAINING PROGRAM

## 2025-01-09 PROCEDURE — 94660 CPAP INITIATION&MGMT: CPT

## 2025-01-09 PROCEDURE — 94640 AIRWAY INHALATION TREATMENT: CPT

## 2025-01-09 PROCEDURE — 94640 AIRWAY INHALATION TREATMENT: CPT | Mod: 76

## 2025-01-09 PROCEDURE — 250N000011 HC RX IP 250 OP 636: Performed by: NURSE PRACTITIONER

## 2025-01-09 PROCEDURE — 93005 ELECTROCARDIOGRAM TRACING: CPT

## 2025-01-09 PROCEDURE — 250N000011 HC RX IP 250 OP 636: Performed by: NURSE ANESTHETIST, CERTIFIED REGISTERED

## 2025-01-09 PROCEDURE — 250N000013 HC RX MED GY IP 250 OP 250 PS 637: Performed by: INTERNAL MEDICINE

## 2025-01-09 PROCEDURE — 250N000009 HC RX 250: Performed by: NURSE ANESTHETIST, CERTIFIED REGISTERED

## 2025-01-09 PROCEDURE — 999N000065 XR ABDOMEN PORT 1 VIEW

## 2025-01-09 PROCEDURE — 250N000013 HC RX MED GY IP 250 OP 250 PS 637: Performed by: STUDENT IN AN ORGANIZED HEALTH CARE EDUCATION/TRAINING PROGRAM

## 2025-01-09 PROCEDURE — 31500 INSERT EMERGENCY AIRWAY: CPT | Performed by: NURSE ANESTHETIST, CERTIFIED REGISTERED

## 2025-01-09 PROCEDURE — 87070 CULTURE OTHR SPECIMN AEROBIC: CPT | Performed by: STUDENT IN AN ORGANIZED HEALTH CARE EDUCATION/TRAINING PROGRAM

## 2025-01-09 PROCEDURE — 36415 COLL VENOUS BLD VENIPUNCTURE: CPT | Performed by: STUDENT IN AN ORGANIZED HEALTH CARE EDUCATION/TRAINING PROGRAM

## 2025-01-09 PROCEDURE — 999N000128 HC STATISTIC PERIPHERAL IV START W/O US GUIDANCE

## 2025-01-09 PROCEDURE — 370N000003 HC ANESTHESIA WARD SERVICE: Performed by: NURSE ANESTHETIST, CERTIFIED REGISTERED

## 2025-01-09 RX ORDER — CARBOXYMETHYLCELLULOSE SODIUM 5 MG/ML
1 SOLUTION/ DROPS OPHTHALMIC
Status: DISCONTINUED | OUTPATIENT
Start: 2025-01-09 | End: 2025-01-25

## 2025-01-09 RX ORDER — GABAPENTIN 250 MG/5ML
100 SOLUTION ORAL DAILY
Status: DISCONTINUED | OUTPATIENT
Start: 2025-01-09 | End: 2025-01-25

## 2025-01-09 RX ORDER — CEFEPIME HYDROCHLORIDE 1 G/1
1 INJECTION, POWDER, FOR SOLUTION INTRAMUSCULAR; INTRAVENOUS EVERY 24 HOURS
Status: DISCONTINUED | OUTPATIENT
Start: 2025-01-09 | End: 2025-01-13

## 2025-01-09 RX ORDER — B COMPLEX C NO.10/FOLIC ACID 900MCG/5ML
5 LIQUID (ML) ORAL DAILY
Status: DISCONTINUED | OUTPATIENT
Start: 2025-01-09 | End: 2025-01-25

## 2025-01-09 RX ORDER — CHLORHEXIDINE GLUCONATE ORAL RINSE 1.2 MG/ML
15 SOLUTION DENTAL EVERY 12 HOURS
Status: DISCONTINUED | OUTPATIENT
Start: 2025-01-09 | End: 2025-01-19

## 2025-01-09 RX ORDER — PROPOFOL 10 MG/ML
5-75 INJECTION, EMULSION INTRAVENOUS CONTINUOUS
Status: DISCONTINUED | OUTPATIENT
Start: 2025-01-09 | End: 2025-01-17

## 2025-01-09 RX ORDER — OSELTAMIVIR PHOSPHATE 6 MG/ML
30 FOR SUSPENSION ORAL
Status: COMPLETED | OUTPATIENT
Start: 2025-01-09 | End: 2025-01-11

## 2025-01-09 RX ORDER — HEPARIN SODIUM 1000 [USP'U]/ML
500 INJECTION, SOLUTION INTRAVENOUS; SUBCUTANEOUS CONTINUOUS
Status: DISCONTINUED | OUTPATIENT
Start: 2025-01-09 | End: 2025-01-09

## 2025-01-09 RX ORDER — DEXTROSE MONOHYDRATE 100 MG/ML
INJECTION, SOLUTION INTRAVENOUS CONTINUOUS PRN
Status: DISCONTINUED | OUTPATIENT
Start: 2025-01-09 | End: 2025-01-25

## 2025-01-09 RX ORDER — CETIRIZINE HYDROCHLORIDE 5 MG/1
5 TABLET ORAL AT BEDTIME
Status: DISCONTINUED | OUTPATIENT
Start: 2025-01-09 | End: 2025-01-25

## 2025-01-09 RX ORDER — CHOLECALCIFEROL (VITAMIN D3) 50 MCG
50 TABLET ORAL DAILY
Status: DISCONTINUED | OUTPATIENT
Start: 2025-01-10 | End: 2025-01-25

## 2025-01-09 RX ORDER — AZITHROMYCIN MONOHYDRATE 500 MG/5ML
500 INJECTION, POWDER, LYOPHILIZED, FOR SOLUTION INTRAVENOUS EVERY 24 HOURS
Status: DISCONTINUED | OUTPATIENT
Start: 2025-01-09 | End: 2025-01-09

## 2025-01-09 RX ORDER — PROPOFOL 10 MG/ML
INJECTION, EMULSION INTRAVENOUS PRN
Status: DISCONTINUED | OUTPATIENT
Start: 2025-01-09 | End: 2025-01-09

## 2025-01-09 RX ORDER — ROPIVACAINE IN 0.9% SOD CHL/PF 0.1 %
.01-.125 PLASTIC BAG, INJECTION (ML) EPIDURAL CONTINUOUS
Status: DISCONTINUED | OUTPATIENT
Start: 2025-01-09 | End: 2025-01-11

## 2025-01-09 RX ORDER — SEVELAMER CARBONATE 800 MG/1
800 TABLET, FILM COATED ORAL
Status: DISCONTINUED | OUTPATIENT
Start: 2025-01-09 | End: 2025-01-25

## 2025-01-09 RX ORDER — ATORVASTATIN CALCIUM 10 MG/1
20 TABLET, FILM COATED ORAL EVERY EVENING
Status: DISCONTINUED | OUTPATIENT
Start: 2025-01-09 | End: 2025-01-25

## 2025-01-09 RX ORDER — CEFEPIME HYDROCHLORIDE 1 G/1
1 INJECTION, POWDER, FOR SOLUTION INTRAMUSCULAR; INTRAVENOUS EVERY 24 HOURS
Status: DISCONTINUED | OUTPATIENT
Start: 2025-01-09 | End: 2025-01-09

## 2025-01-09 RX ADMIN — LIDOCAINE HYDROCHLORIDE 0.5 ML: 10 INJECTION, SOLUTION EPIDURAL; INFILTRATION; INTRACAUDAL; PERINEURAL at 10:12

## 2025-01-09 RX ADMIN — IPRATROPIUM BROMIDE AND ALBUTEROL SULFATE 3 ML: .5; 3 SOLUTION RESPIRATORY (INHALATION) at 12:15

## 2025-01-09 RX ADMIN — IPRATROPIUM BROMIDE AND ALBUTEROL SULFATE 3 ML: .5; 3 SOLUTION RESPIRATORY (INHALATION) at 14:09

## 2025-01-09 RX ADMIN — PROPOFOL 120 MG: 10 INJECTION, EMULSION INTRAVENOUS at 09:28

## 2025-01-09 RX ADMIN — PROPOFOL 20 MCG/KG/MIN: 10 INJECTION, EMULSION INTRAVENOUS at 15:18

## 2025-01-09 RX ADMIN — HEPARIN SODIUM 500 UNITS: 1000 INJECTION INTRAVENOUS; SUBCUTANEOUS at 10:11

## 2025-01-09 RX ADMIN — CETIRIZINE HYDROCHLORIDE 5 MG: 5 TABLET ORAL at 21:01

## 2025-01-09 RX ADMIN — SODIUM CHLORIDE 200 ML: 9 INJECTION, SOLUTION INTRAVENOUS at 10:12

## 2025-01-09 RX ADMIN — OSELTAMIVIR PHOSPHATE 30 MG: 6 FOR SUSPENSION ORAL at 13:49

## 2025-01-09 RX ADMIN — Medication: at 10:12

## 2025-01-09 RX ADMIN — PROPOFOL 25 MCG/KG/MIN: 10 INJECTION, EMULSION INTRAVENOUS at 20:43

## 2025-01-09 RX ADMIN — DEXTROSE MONOHYDRATE 50 ML: 25 INJECTION, SOLUTION INTRAVENOUS at 21:08

## 2025-01-09 RX ADMIN — HEPARIN SODIUM 5000 UNITS: 5000 INJECTION, SOLUTION INTRAVENOUS; SUBCUTANEOUS at 06:16

## 2025-01-09 RX ADMIN — IPRATROPIUM BROMIDE AND ALBUTEROL SULFATE 3 ML: .5; 3 SOLUTION RESPIRATORY (INHALATION) at 19:25

## 2025-01-09 RX ADMIN — VANCOMYCIN HYDROCHLORIDE 2000 MG: 10 INJECTION, POWDER, LYOPHILIZED, FOR SOLUTION INTRAVENOUS at 15:19

## 2025-01-09 RX ADMIN — TACROLIMUS: 1 OINTMENT TOPICAL at 22:19

## 2025-01-09 RX ADMIN — HEPARIN SODIUM 5000 UNITS: 5000 INJECTION, SOLUTION INTRAVENOUS; SUBCUTANEOUS at 13:47

## 2025-01-09 RX ADMIN — HEPARIN SODIUM 500 UNITS/HR: 1000 INJECTION INTRAVENOUS; SUBCUTANEOUS at 10:12

## 2025-01-09 RX ADMIN — CHLORHEXIDINE GLUCONATE 15 ML: 1.2 RINSE ORAL at 20:43

## 2025-01-09 RX ADMIN — ACETAMINOPHEN 650 MG: 325 TABLET, FILM COATED ORAL at 16:22

## 2025-01-09 RX ADMIN — Medication 5 ML: at 16:12

## 2025-01-09 RX ADMIN — ALBUTEROL SULFATE 2 PUFF: 108 INHALANT RESPIRATORY (INHALATION) at 00:21

## 2025-01-09 RX ADMIN — ATORVASTATIN CALCIUM 20 MG: 10 TABLET, FILM COATED ORAL at 20:43

## 2025-01-09 RX ADMIN — ROCURONIUM BROMIDE 70 MG: 50 INJECTION, SOLUTION INTRAVENOUS at 09:28

## 2025-01-09 RX ADMIN — SODIUM CHLORIDE 250 ML: 9 INJECTION, SOLUTION INTRAVENOUS at 10:12

## 2025-01-09 RX ADMIN — HEPARIN SODIUM 5000 UNITS: 5000 INJECTION, SOLUTION INTRAVENOUS; SUBCUTANEOUS at 21:01

## 2025-01-09 RX ADMIN — CEFEPIME 1 G: 1 INJECTION, POWDER, FOR SOLUTION INTRAMUSCULAR; INTRAVENOUS at 13:45

## 2025-01-09 RX ADMIN — SERTRALINE HYDROCHLORIDE 75 MG: 50 TABLET ORAL at 20:42

## 2025-01-09 ASSESSMENT — ACTIVITIES OF DAILY LIVING (ADL)
ADLS_ACUITY_SCORE: 61
ADLS_ACUITY_SCORE: 71
ADLS_ACUITY_SCORE: 61
ADLS_ACUITY_SCORE: 71
ADLS_ACUITY_SCORE: 69
ADLS_ACUITY_SCORE: 72
ADLS_ACUITY_SCORE: 61
ADLS_ACUITY_SCORE: 69
ADLS_ACUITY_SCORE: 58
ADLS_ACUITY_SCORE: 72
ADLS_ACUITY_SCORE: 70
ADLS_ACUITY_SCORE: 72
ADLS_ACUITY_SCORE: 61
ADLS_ACUITY_SCORE: 70
ADLS_ACUITY_SCORE: 69
ADLS_ACUITY_SCORE: 61
ADLS_ACUITY_SCORE: 61
ADLS_ACUITY_SCORE: 69
ADLS_ACUITY_SCORE: 72
ADLS_ACUITY_SCORE: 69
ADLS_ACUITY_SCORE: 72
ADLS_ACUITY_SCORE: 69
ADLS_ACUITY_SCORE: 69

## 2025-01-09 NOTE — CODE/RAPID RESPONSE
[facility]    RRT Note  1/9/2025   Time Called: 9:14 AM    RRT called for: Hypoxia    HPI:    Supriya Herr is a 74 year old female w/ PMH of  ESRD on HD, CHF, COPD, DM type II, hypertension  who was admitted on 1/8/2025 for acute on chronic respiratory failure with influenza A    Patient is on dialysis, arrived on 5 L but because of hypoxia had to be increased to 7 L during the treatment.  Per nephrology attending physician who was present at bedside when I arrived for the RRT patient had been awake alert talking.  Aggressive volume removal was attempted to offset her hypoxia but even after 1 L ultrafiltration no significant improvement in her oxygenation.  Patient was becoming more agitated anxious and worsening hypoxia so RRT was activated.    On my arrival patient had SpO2 in the mid to low 60s with good waveform.  She is awake but not alert or interactive not able to follow commands.  She has upper airway gurgling noises.  It is my opinion that she was a poor candidate for NIPPV and I made the decision to proceed with emergent intubation.  Anesthesia was summoned to the bedside and patient was emergently intubated.    Assessment & Plan   Acute on chronic respiratory failure likely multifactorial secondary to worsening influenza, pulmonary edema and/or AECOPD    INTERVENTIONS:  -As above poor candidate for NIPPV given her encephalopathy so proceeded with emergent intubation  - Intubated by anesthesia team with 7.0 ET tube, 21 cm at the lip with 120 mg propofol, 70 mg rocuronium  - Postintubation sedation with propofol infusion  - EKG which I personally reviewed, see below, no acute ischemic changes  - Consult intensivist, I have personally spoken with ICU attending MD.  - Transfer to ICU  - I contacted patient's daughter by phone and updated her on change in patient's condition    Last 24H PRN:     albuterol (PROVENTIL HFA/VENTOLIN HFA) inhaler, 2 puff at 01/09/25 0021    benzonatate  (TESSALON) capsule 100 mg, 100 mg at 01/08/25 9591    Working diagnosis: Worsening acute on chronic respiratory failure    At the end of the RRT been intubated was hemodynamically stable on transport to ICU    disposition transfer to ICU    Discussed with and defer further cares to hospitalist attending physician Dr. Hernandez and ICU attending MD     Code Status: Full Code    Physical Exam   Vital Signs with Ranges:  Temp:  [97.6  F (36.4  C)-98.5  F (36.9  C)] 98.5  F (36.9  C)  Pulse:  [21-87] 57  Resp:  [0-53] 18  BP: ()/() 96/39  FiO2 (%):  [40 %] 40 %  SpO2:  [47 %-100 %] 98 %  No intake/output data recorded.    Constitutional: vs as above and/or per EMR  General:  adult pt lying in bed with acute distress   GCS:   Motor 5=Localizes pain   Verbal 1=None   Eye Opening 4=Spontaneous   Total: 9     Neuro: Does not follows commands wiggle toes, nonverbal   eyes pupils equal round 3mm briskly reactive bilat, sclera nonicteric, noninjected, conjunctiva pink,  Head, ENT & mouth: NC/AT,  mouth moist oral mucosa  Neck: supple  CV S1S2 no murmurs  resp: Gurgling Upper airway noises, positive air entry bilateral upper lobes   gi:normoactive bowel sounds, soft, nontender, nondisteded  Ext: no edema or mottling, LLE amputation, LUE fistula  Skin: no rashes on exposed skin  Musculoskeletal no bony joint deformities      Data     EKG:  Interpreted by PENELOPE Abdullahi CNP  Time reviewed: 9:35 AM  Symptoms at time of EKG: Respiratory failure  Rhythm: normal sinus   Rate: Normal  Axis: Normal  Ectopy: none  Conduction: normal  ST Segments/ T Waves: No acute ischemic changes  Q Waves: none    Clinical Impression: normal EKG    IMAGING: (X-ray/CT/MRI)   Recent Results (from the past 24 hours)   XR Chest Port 1 View    Narrative    EXAM: XR CHEST PORT 1 VIEW  LOCATION: [facility]  DATE: 1/9/2025    INDICATION: Endotracheal tube positioning.  COMPARISON: 1/8/2025      Impression     IMPRESSION: Endotracheal tube tip 3.3 cm from the tomasa. Scattered interstitial changes similar to previous. Enteric tube tip below the margin of the study.   XR Abdomen Port 1 View    Narrative    EXAM: XR ABDOMEN PORT 1 VIEW  LOCATION: [facility]  DATE: 1/9/2025    INDICATION: Feeding tube placement.  COMPARISON: None.      Impression    IMPRESSION:   Enteric tube tip in the body of the stomach.       CBC with Diff:  Recent Labs   Lab Test 01/09/25  0734 06/03/24  1644 04/24/24  0913   WBC 5.8   < > 6.3   HGB 8.6*   < > 10.1*      < > 101*   *   < > 139*   INR  --   --  1.11    < > = values in this interval not displayed.      Comprehensive Metabolic Panel:  Recent Labs   Lab 01/09/25  0957 01/09/25  0744 01/09/25  0734 01/08/25  1836 01/08/25  0742   NA  --   --  134*  --  135   POTASSIUM  --   --  5.2  --  4.7   CHLORIDE  --   --  100  --  98   CO2  --   --  22  --  26   ANIONGAP  --   --  12  --  11   *   < > 102*   < > 155*   BUN  --   --  47.4*  --  27.7*   CR  --   --  6.61*  --  4.71*   GFRESTIMATED  --   --  6*  --  9*   JODY  --   --  8.8  --  9.3   PROTTOTAL  --   --   --   --  7.1   ALBUMIN  --   --   --   --  3.6   BILITOTAL  --   --   --   --  0.2   ALKPHOS  --   --   --   --  72   AST  --   --   --   --  26   ALT  --   --   --   --  23    < > = values in this interval not displayed.       Time Spent on this Encounter   I spent 40 minutes (915-9:55 AM) of critical care time on the unit/floor managing the care of Supriya Herr. Upon evaluation, this patient had a high probability of imminent or life-threatening deterioration due to pending respiratory failure, which required my direct attention, intervention, and personal management including decision to pursue emergent intubation 100% of my time was spent at the bedside counseling the patient and/or coordinating care regarding services listed in this note.    PENELOPE Abdullahi CNP  Hospitalist  [facility]  Securely message with [employer] (more info)  Text page via [facility] Paging/Directory

## 2025-01-09 NOTE — PROGRESS NOTES
"Received consult to place 2 PIV's. Pt has active L arm fistula. History of R arm fistula that daughter reports \"was removed\". Pt has 18G PIV in R AC. Assessed R hand and forearm for viable vein to place PIV. Noted small vein on posterior/lateral aspect of lower R forearm. Unable to visualize any other vein for PIV. No fistula noted on RUE.  Assessed R forearm with US. Unable to view any viable vein to use for PIV placement. Placed  1inch 22G PIV in small vein located on the posterior/lateral aspect of R lower forearm. Notified bedside RN that only 1 PIV was able to be placed. Due to limited peripheral veins for PIV access, CVC likely necessary if pt requires further access. Pt is not a PICC candidate due to existing L arm fistula- bedside RN also reports that nephrologist does not want PICC in RUE in order to preserve veins for future fistula if necessary. Please reconsult if assistance needed.  "
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
## 2025-01-09 NOTE — PROGRESS NOTES
Events noted.  Patient was intubated in the dialysis unit before I could evaluate her.  She will be in the ICU with intensivist managing her care.  Hospitalist service will sign off.

## 2025-01-09 NOTE — CONSULTS
SPIRITUAL HEALTH SERVICES Consult Note  [facility]  ICU    Reason for visit or referral: [facility] consult request for emotional support.    Met with daughter Caroline in visitor avinash. She indicates that she believed pt was doing well until she was called to hospital following transfer fro dialysis to ICU.    Caroline reports that she is the primary caretaker for pt and that pt has been living in her home for some time. Per Caroline, pt's sister has been informed of her condition and plans to visit hospital today.    Caroline indicates that he mother is Rastafarian but is not presently connected to a Oriental orthodox community. She requests prayer and indicates the family has no other immediate Christian or spiritual needs. Future visits from  are welcome.    Plan: Advised pt/family of the availability of  services by request.  to follow while pt is in hospital. Follow-up visit planned for the next 24 hours.    Owen Acevedo  [facility]ate [facility] Spiritual Health Phone Line [phone]  Spiritual Health Pager [phone]    [facility] available 24/7 for emergent requests/referrals, either by paging the on-call  or by entering an ASAP/STAT consult in [facility], which will also page the on-call .

## 2025-01-09 NOTE — PLAN OF CARE
Goal Outcome Evaluation:      Plan of Care Reviewed With: patient    Overall Patient Progress: no changeOverall Patient Progress: no change     SUMMARY: SOB, found to have Influenza A     DATE & TIME: 01/08/2025 - 01/09/2025 [id number]    Cognitive Concerns/ Orientation: A & O x4; forgetful ; intermittent confusion throughout the night   BEHAVIOR & AGGRESSION TOOL COLOR: Green  ABNL VS/O2: VSS on 2L NC baseline; BIPAP on at bedtime for increased shortness of breath - RRT called this shift  MOBILITY: Assist x2; WC at baseline, stand and pivot from ED bed to bed in room   PAIN MANAGMENT: Denied pain this shift   DIET: Renal   BOWEL/BLADDER: Incontinent of bowel; No BM this shift, on Hemodialysis - produces some urine per pt.   ABNL LAB/BG: , 102; Creat 4.71. Trop 63  DRAIN/DEVICES: R PIV SL; LUE Fistula   TELEMETRY RHYTHM: NA  SKIN: L AKA, abhijit/dusky RLE, scattered bruises and scabs, cracked Right heel, redness blanchable coccyx- mepi in place C/D/I, scattered psoriasis in maciel area, under breasts and arms, Mepi in place of right foot for scrape- C/D/I  TESTS/PROCEDURES: None this shift   D/C DATE: Pending improvement     OTHER IMPORTANT INFO: RRT called at 0024 for increase need of O2. Pt. Went on BIPAP after and tolerated it fine throughout the night. Lungs diminished with expiratory wheezes.  PRN inhaler given x1, PRN tessalon pearls given x1, pt. Refused nebs this shift     Throughout shift pt. Would get intermittently confused, anxious and in a panic state were she would want to leave, pt was redirectable and consolable. Called daughter this shift to give update. Pt. Has coloring books and coloring pencils at bedside to help with anxiety

## 2025-01-09 NOTE — PROGRESS NOTES
Inpatient Dialysis Progress Note            Assessment and Plan:       Supriya Herr is a 74 year old female with ESRD who was admitted on 1/8/2025.      1) Influenza A with hypoxic resp failure.  On supplemental O2.  Oseltamivir started at ESRD dose.  More hypoxic perhaps due to influenza getting  worse.     2) ESRD due to T2DM/HTN.  She normally runs at [place] on [place] under direction of Dr. Basilio.  She has a CLEM, 3.5 H, .  TW ?     3) Anemia: HGB 8.8.  She is on Mircera as outpt.     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  Vit D 2000 units daily.  Renvela 1600 mg TID c meals and 800 mg with snacks.       Plan:     We will likely attempt HD again tomorrow.           Interval History:      ~60 minutes into HD, O2 sats fell and she became quite restless.  FIO2 increased to 15 LPM but O2 sats remained low.  RRT called and she was intubated.  Now in ICU.      Never hypotensive.  BG was 107        Dialysis Parameters:     Wt Readings from Last 4 Encounters:   01/09/25 96.3 kg (212 lb 4.9 oz)   06/14/24 101.1 kg (222 lb 14.2 oz)   06/03/24 101.1 kg (222 lb 14.2 oz)   04/24/24 101.1 kg (222 lb 14.2 oz)     No intake/output data recorded.  BP Readings from Last 3 Encounters:   01/09/25 138/50   08/13/24 (!) 150/69   07/19/24 126/60       Routine, ONE TIME, Starting today For 1 Occurrences  Weight Loss (kg): 2  Dialysis Temp: 36.5  C  Access Device: AVF  Access Site: L arm   Dialyzer: Revaclear  Dialysis Bath: K 2  Blood Flow Rate (mL/min): 400  Total Treatment Time (hrs): 3.5  Heparin: yes         Medications and Allergies:   Reviewed in EPIC    Current Facility-Administered Medications   Medication Dose Route Frequency Provider Last Rate Last Admin    - MEDICATION INSTRUCTIONS for Dialysis Patients -   Does not apply See Admin Instructions Dusty Prieto MD        [Held by provider] amLODIPine (NORVASC) tablet 5 mg  5  mg Oral BID Marcell Grubbs PA-C   5 mg at 01/08/25 2217    atorvastatin (LIPITOR) tablet 20 mg  20 mg Oral QPM Marcell Grubbs PA-C   20 mg at 01/08/25 2210    [Held by provider] carvedilol (COREG) tablet 25 mg  25 mg Oral BID w/meals Marcell Grubbs PA-C   25 mg at 01/08/25 1905    cetirizine (zyrTEC) tablet 5 mg  5 mg Oral At Bedtime Marcell Grubbs PA-C   5 mg at 01/08/25 2211    chlorhexidine (PERIDEX) 0.12 % solution 15 mL  15 mL Mouth/Throat Q12H Truman Moreland MD        epoetin candy-epbx (RETACRIT) injection 10,000 Units  10,000 Units Intravenous Once Dusty Prieto MD        [Held by provider] furosemide (LASIX) tablet 40 mg  40 mg Oral Once per day on Tuesday Thursday Saturday Marcell Grubbs PA-C        And    [Held by provider] furosemide (LASIX) tablet 80 mg  80 mg Oral Once per day on Tuesday Thursday Saturday Marcell Grubbs PA-C        [Held by provider] furosemide (LASIX) tablet 80 mg  80 mg Oral 2 times per day on Sunday Monday Wednesday Friday Marcell Grubbs PA-C        gabapentin (NEURONTIN) capsule 100 mg  100 mg Oral QAM Marcell Grubbs PA-C   100 mg at 01/08/25 1713    heparin (porcine) injection  500 Units Hemodialysis Machine OR IV Push Once in dialysis/CRRT Dusty Prieto MD        heparin ANTICOAGULANT injection 5,000 Units  5,000 Units Subcutaneous Q8H Marcell Grubbs PA-C   5,000 Units at 01/09/25 0616    insulin aspart (NovoLOG) injection (RAPID ACTING)  1-7 Units Subcutaneous TID AC Marcell Grubbs PA-C        insulin aspart (NovoLOG) injection (RAPID ACTING)  1-5 Units Subcutaneous At Bedtime Marcell Grubbs PA-C        [Held by provider] insulin glargine (LANTUS PEN) injection 12 Units  12 Units Subcutaneous At Bedtime Marcell Grubbs PA-C   12 Units at 01/08/25 2212    ipratropium - albuterol 0.5 mg/2.5 mg/3 mL (DUONEB) neb solution 3 mL  3 mL Nebulization 4x daily Marcell Grubbs PA-C   3 mL at 01/08/25 1714    oseltamivir (TAMIFLU) capsule 30 mg  30 mg Oral  Once Dusty Prieto MD        And    [START ON 1/11/2025] oseltamivir (TAMIFLU) capsule 30 mg  30 mg Oral Once Dusty Prieto MD        [START ON 1/10/2025] pantoprazole (PROTONIX) 2 mg/mL suspension 40 mg  40 mg Per Feeding Tube QAM  Truman Moreland MD        Or    [START ON 1/10/2025] pantoprazole (PROTONIX) IV push injection 40 mg  40 mg Intravenous [facility] Truman Moreland MD        sertraline (ZOLOFT) tablet 75 mg  75 mg Oral QPM Marcell Grubbs PA-C   75 mg at 01/08/25 2210    sevelamer carbonate (RENVELA) tablet 800 mg  800 mg Oral TID w/meals Marcell Grubbs PA-C   800 mg at 01/08/25 1905    sodium chloride (PF) 0.9% PF flush 3 mL  3 mL Intracatheter Q8H Marcell Grubbs PA-C   3 mL at 01/08/25 1906    sodium chloride 0.9% BOLUS 200 mL  200 mL Hemodialysis Machine Once Dusty Prieto MD        sodium chloride 0.9% BOLUS 250 mL  250 mL Intravenous Once in dialysis/CRRT Dusty Prieto MD        tacrolimus (PROTOPIC) 0.1 % ointment   Topical BID Marcell Grubbs PA-C        vitamin D3 (CHOLECALCIFEROL) tablet 50 mcg  50 mcg Oral Daily Marcell Grubbs PA-C   50 mcg at 01/08/25 1613     Current Facility-Administered Medications   Medication Dose Route Frequency Provider Last Rate Last Admin    acetaminophen (TYLENOL) tablet 650 mg  650 mg Oral Q4H PRN Marcell Grubbs PA-C        Or    acetaminophen (TYLENOL) Suppository 650 mg  650 mg Rectal Q4H PRN Marcell Grubbs PA-C        albuterol (PROVENTIL HFA/VENTOLIN HFA) inhaler  2 puff Inhalation Q6H PRN Marcell Grubbs PA-C   2 puff at 01/09/25 0021    benzonatate (TESSALON) capsule 100 mg  100 mg Oral TID PRN Marcell Grubbs PA-C   100 mg at 01/08/25 0078    calcium carbonate (TUMS) chewable tablet 1,000 mg  1,000 mg Oral 4x Daily PRN Marcell Grubbs PA-C        carboxymethylcellulose PF (REFRESH PLUS) 0.5 % ophthalmic solution 1 drop  1 drop Both Eyes Q1H PRN Ted Proctor APRN CNP        glucose gel 15-30 g  15-30 g Oral Q15 Min PRN Romie,  Marcell CARRENO PA-C        Or    dextrose 50 % injection 25-50 mL  25-50 mL Intravenous Q15 Min PRN Marcell Grubbs PA-C        Or    glucagon injection 1 mg  1 mg Subcutaneous Q15 Min PRN Marcell Grubbs PA-C        ipratropium - albuterol 0.5 mg/2.5 mg/3 mL (DUONEB) neb solution 3 mL  3 mL Nebulization Q10 Min PRN Marcell Grubbs PA-C   3 mL at 01/08/25 0756    lidocaine (LMX4) cream   Topical Q1H PRN Marcell Grubbs PA-C        lidocaine 1 % 0.1-1 mL  0.1-1 mL Other Q1H PRN Marcell Grubbs PA-C        lidocaine 1 % 0.5 mL  0.5 mL Intradermal Once PRN Dusty Prieto MD        lidocaine 1 % 0.5 mL  0.5 mL Intradermal Once PRN Dusty Prieto MD        propofol (DIPRIVAN) bolus from bag or syringe pump  10 mg Intravenous Q15 Min PRN Celine Plata APRN CNP        And    Medication Instruction   Does not apply Continuous PRN Celine Plata APRN CNP        melatonin tablet 1 mg  1 mg Oral At Bedtime PRN Marcell Grubbs PA-C        miconazole (MICATIN) 2 % powder   Topical BID PRN Marcell Grubbs PA-C        No lozenges or gum should be given while patient on BIPAP/AVAPS/AVAPS AE   Does not apply Continuous PRN Ted Proctor APRN CNP        ondansetron (ZOFRAN ODT) ODT tab 4 mg  4 mg Oral Q6H PRN Marcell Grubbs PA-C        Or    ondansetron (ZOFRAN) injection 4 mg  4 mg Intravenous Q6H PRN Marcell Grubbs PA-C        Patient may continue current oral medications   Does not apply Continuous PRN Ted Proctor APRN CNP        senna-docusate (SENOKOT-S/PERICOLACE) 8.6-50 MG per tablet 1 tablet  1 tablet Oral BID PRN Marcell Grubbs PA-C        Or    senna-docusate (SENOKOT-S/PERICOLACE) 8.6-50 MG per tablet 2 tablet  2 tablet Oral BID PRN Marcell Grubbs PA-C        sodium chloride (PF) 0.9% PF flush 3 mL  3 mL Intracatheter q1 min prn Marcell Grubbs PA-C        sodium chloride 0.9% BOLUS 100-150 mL  100-150 mL Intravenous Q15 Min PRN Dusty Prieto MD        Stop Heparin 60 minutes before end of  treatment   Does not apply Continuous PRN Dusty Prieto MD            Allergies   Allergen Reactions    Ampicillin-Sulbactam Sodium Rash     No evidence SJS, but very uncomfortable and precipitated multiple provider visits. Would not use penicillins again if other options available.     Penicillins Rash              Labs:     BMP  Recent Labs   Lab 01/09/25  0957 01/09/25  0910 01/09/25  0744 01/09/25  0734 01/08/25  1836 01/08/25  0742   NA  --   --   --  134*  --  135   POTASSIUM  --   --   --  5.2  --  4.7   CHLORIDE  --   --   --  100  --  98   JODY  --   --   --  8.8  --  9.3   CO2  --   --   --  22  --  26   BUN  --   --   --  47.4*  --  27.7*   CR  --   --   --  6.61*  --  4.71*   * 107* 112* 102*   < > 155*    < > = values in this interval not displayed.     CBC  Recent Labs   Lab 01/09/25  0734 01/08/25  0742   WBC 5.8 4.0   HGB 8.6* 8.8*   HCT 28.2* 29.3*    101*   * 148*     Lab Results   Component Value Date    AST 26 01/08/2025    ALT 23 01/08/2025    ALKPHOS 72 01/08/2025    BILITOTAL 0.2 01/08/2025            Physical Exam:   Vitals were reviewed in [facility]    Wt Readings from Last 3 Encounters:   01/09/25 96.3 kg (212 lb 4.9 oz)   06/14/24 101.1 kg (222 lb 14.2 oz)   06/03/24 101.1 kg (222 lb 14.2 oz)       Intake/Output Summary (Last 24 hours) at 1/9/2025 1005  Last data filed at 1/9/2025 0908  Gross per 24 hour   Intake --   Output 0 ml   Net 0 ml       GENERAL APPEARANCE: poorly responsive, ashen  HEENT:  opens eyes but does not focus.    RESP: shallow resp, gurgling.    CV: regular rate and rhythm, normal S1 S2, no murmur, click or rub   ABDOMEN: soft, nontender, bowel sounds normal  EXTREMITIES/SKIN: tr RLE edema, L AKA     Pt seen on dialysis.  Stable run.  Good BFR.      Attestation:  I have reviewed today's vital signs, notes, medications, labs and imaging.     Dusty Prieto MD  [facility] - Nephrology  [phone]

## 2025-01-09 NOTE — ANESTHESIA PROCEDURE NOTES
Airway       Patient location: Dialysis.       Procedure Start/Stop Times: 1/9/2025 9:28 AM  Staff -        CRNA: Anne Arcos APRN CRNA       Performed By: CRNA  Consent for Airway        Urgency: emergent       Consent: The procedure was performed in an emergent situation.  Indications and Patient Condition       Indications for airway management: respiratory insufficiency       Induction type:RSI       Mask difficulty assessment: 1 - vent by mask    Final Airway Details       Final airway type: endotracheal airway       Successful airway: ETT - single  Endotracheal Airway Details        ETT size (mm): 7.0       Cuffed: yes       Successful intubation technique: video laryngoscopy       VL Blade Size: Rausch 3       Grade View of Cords: 1       Adjucts: stylet       Position: Center       Measured from: lips       Secured at (cm): 21       Bite block used: None    Post intubation assessment        Placement verified by: capnometry, equal breath sounds and chest rise        Number of attempts at approach: 1       Secured with: commercial tube cardoso       Ease of procedure: easy       Dentition: Unchanged    Medication(s) Administered   Medication Administration Time: 1/9/2025 9:28 AM

## 2025-01-09 NOTE — PROGRESS NOTES
Admission    Patient arrives to room 611 via cart from ED.  Care plan note: Done     Inpatient nursing criteria listed below were met:    Did you put disposition on whiteboard and in sticky note: Yes  Full skin assessment done (add LDA if skin issue present). Initials of 2nd RN :Yes HC  Isolation education started/completed Yes  Patient allergies verified with patient: Yes  Fall Risk? (Care plan updated, Education given and documented) Yes  Primary Care Plan initiated: Yes  Home medications documented in belongings flowsheet: NA  Patient belongings documented in belongings flowsheet: Yes  Reminder note (belongings/ medications) placed in discharge instructions:Yes  Admission profile/ required documentation complete: Yes  If patient is a 72 hour hold/Commitment are belongings removed from room and locked up? NA

## 2025-01-09 NOTE — PROGRESS NOTES
[facility] ICU RESPIRATORY NOTE        Date of Admission: 1/8/2025     Date of Intubation (most recent): 1/9/2025    Reason for Mechanical Ventilation: Airway protection     Number of Days on Mechanical Ventilation: 1    Met Criteria for Spontaneous Breathing Trial: No    Reason for No Spontaneous Breathing Trial: Per MD     Bite Block: No    Significant Events Today: None     ABG Results:   Recent Labs   Lab 01/09/25  1540 01/08/25  1151   O2PER 30 5         Current Vent Settings: FiO2 (%): 40 %, Resp: 16, Vent Mode: CMV/AC, Resp Rate (Set): 16 breaths/min (Per new order), Tidal Volume (Set, mL): 480 mL, PEEP (cm H2O): 5 cmH2O, Resp Rate (Set): 16 breaths/min (Per new order), Tidal Volume (Set, mL): 480 mL, PEEP (cm H2O): 5 cmH2O    Skin Assessment: Skin intact     Plan: Continue full vent support and wean as tolerated    RT Lissette on 1/9/2025 at 5:19 PM

## 2025-01-09 NOTE — PROGRESS NOTES
Patient transferred to dialysis at 0745. VSS on 5 L Oxymask. Had a little anxiety about going to dialysis. Transferred to dialysis in her bed without any respiratory distress. Dialysis called @ 0915 d/t patient c/o anxiety. Before we could get down there they called the unit back and stated they were calling an RRT. See notes from RRT.

## 2025-01-09 NOTE — PROGRESS NOTES
RRT was called to dialysis due to respiratory distress. Pt was intubated there with no incidents. ETT 7 @22 teeth. Pt was transported to ICU and placed on MV 40%, , RR18, PEEP+5. Report was given to ICU doctor, RT and nurse.

## 2025-01-09 NOTE — ANESTHESIA CARE TRANSFER NOTE
Patient: Supriya Herr    Procedure: * No procedures listed *       Diagnosis: * No pre-op diagnosis entered *  Diagnosis Additional Information: No value filed.    Anesthesia Type:   No value filed.     Note:    Oropharynx: endotracheal tube in place  Level of Consciousness: iatrogenic sedation  Patient oxygen source: vent.    Independent Airway: airway patency not satisfactory and stable  Dentition: dentition unchanged  Vital Signs Stable: post-procedure vital signs reviewed and stable  Report to RN Given: handoff report given  Destination: dialysis.          Vitals:  Vitals Value Taken Time   /50 01/09/25 0933   Temp     Pulse 71 01/09/25 0937   Resp 8 01/09/25 0937   SpO2 100 % 01/09/25 0936   Vitals shown include unfiled device data.    Electronically Signed By: PENELOPE Barakat CRNA  January 9, 2025  9:43 AM

## 2025-01-09 NOTE — CONSULTS
"CLINICAL NUTRITION SERVICES - ASSESSMENT NOTE    Malnutrition Status:    % Intake: Unable to assess  % Weight Loss: Weight loss does not meet criteria   Subcutaneous Fat Loss: Unable to assess  Muscle Loss: None observed  Fluid Accumulation/Edema: Mild, 1+ (generalized)  Malnutrition Diagnosis: Unable to determine due to lack of nutrition history + inability to perform Nutrition Focused Physical Exam  Malnutrition Present on Admission: Unable to assess    Registered Dietitian Interventions:  Enteral nutrition management - Orders written to begin Vital HP at 10 mL/hr and increase every 12 hours by 20 mL to goal.   Vital HP at 50 mL/hr= 1200 kcal, 104 g protein (1.9 g/kg), 133 g CHO, 1003 mL H2O  Total with Propofol = 1504 kcal (16 kcal/kg)  Management of flushing of feeding tube - Flushes 60 mL every 4 hours   Vitamin and mineral supplement therapy - Nephronex daily        REASON FOR ASSESSMENT  Provider order - Registered Dietitian to order TF per Medical Nutrition Therapy Guidelines    SUBJECTIVE INFORMATION  Patient not available for interview due to intubated and sedated.  Also in respiratory isolation     NUTRITION HISTORY  Unable to obtain    Patient admitted with the following -->  Influenza A   Acute on chronic respiratory failure secondary to influenza A   1/9: Intubated       CURRENT NUTRITION ORDERS  Diet: NPO  Asked to see patient for TF initiation     CURRENT INTAKE/TOLERANCE  N/A    LABS  Nutrition-relevant labs: Na 134 (L)  BUN 47.4 (H), Cr 6.61 (H) - ESRD on HD     MEDICATIONS  Nutrition-relevant medications:  Propofol at 11.5 mL/hr= 304 kcal     ANTHROPOMETRICS  Height: 170.2 cm (5' 7\")  Most Recent Weight: 96.3 kg (212 lb 4.9 oz) (1/9)  IBW: 55.3 kg  % IBW: 174%  BMI (kg/m ): Obesity Class II BMI 30-34.9 (36.8 kg/m^2)  Weight History:   Wt Readings from Last 10 Encounters:   01/09/25 96.3 kg (212 lb 4.9 oz)   06/14/24 101.1 kg (222 lb 14.2 oz)   06/03/24 101.1 kg (222 lb 14.2 oz)   04/24/24 101.1 kg " (222 lb 14.2 oz)   02/05/24 95.8 kg (211 lb 3.2 oz)   04/21/23 98 kg (216 lb)   01/31/23 94.3 kg (208 lb)   06/27/22 94.3 kg (208 lb)   06/27/22 94.3 kg (208 lb)   05/04/22 95.3 kg (210 lb)     Down 11# or 5% in the last 7 months     Dosing Weight: 96.3 kg (energy) and 55.3 kg (protein)  based on actual wt and ideal wt    ASSESSED NUTRITION NEEDS  Estimated Energy Needs: 0243-9996 kcals/day (14 - 17 kcals/kg)  Justification: Obese and Vented  Estimated Protein Needs:  grams protein/day (1.5 - 2 grams of pro/kg)  Justification: Hypercatabolism with critical illness and Obesity guidelines  Estimated Fluid Needs: 1600 mL/day (1 mL/kcal)  Justification:  or per MD     SYSTEM FINDINGS      Skin/wounds: Left buttock wound d/t friction -- WOCN following   GI symptoms: N/A    MALNUTRITION  % Intake: Unable to assess  % Weight Loss: Weight loss does not meet criteria   Subcutaneous Fat Loss: Unable to assess  Muscle Loss: None observed  Fluid Accumulation/Edema: Mild, 1+ (generalized)  Malnutrition Diagnosis: Unable to determine due to lack of nutrition history + inability to perform Nutrition Focused Physical Exam  Malnutrition Present on Admission: Unable to assess    NUTRITION DIAGNOSIS  Inadequate protein energy intake related to NPO with plans to start TF today as evidenced by meeting 0% protein and 20% energy needs from Propofol     INTERVENTIONS  Enteral nutrition management - Orders written to begin Vital HP at 10 mL/hr and increase every 12 hours by 20 mL to goal.   Vital HP at 50 mL/hr= 1200 kcal, 104 g protein (1.9 g/kg), 133 g CHO, 1003 mL H2O  Total with Propofol = 1504 kcal (16 kcal/kg)  Management of flushing of feeding tube - Flushes 60 mL every 4 hours   Vitamin and mineral supplement therapy - Nephronex daily     Goals  Goal TF regimen will meet % needs      Monitoring/Evaluation  Progress toward goals will be monitored and evaluated per policy    Miguelina Feliciano, RD, LD, CNSC   Clinical  Dietitian - [facility]

## 2025-01-09 NOTE — PROGRESS NOTES
DIALYSIS PROCEDURE NOTE      Patient dialyzed for  1hrs. on a K2 bath with a net fluid removal of  0L.  A BFR of 450 ml/min was obtained via a 15 using 15 gauge needles.      The treatment plan was discussed with Dr. Prieto during the treatment.    Total heparin received during the treatment: 0.4 units.   Needle cannulation transported with patient taken off in pts room in the ICU.    Meds  given: none-rapid called    Complications: Received report that patient had a rapid called yesterday due to anxiety with 02 levels that dropped. Patient was on for an hour before they started to get restless with 02 levels dropping. Patient was initially on 5L of o2 but was raised to 10L with stats being in the high 40s and low 50s. Checked blood sugars, Informed nephrologist, and called a rapid due to the patient not responding. Patient was intubated in the dialysis room and transferred to the ICU.     Person educated: jose. Barriers to learning: none. Educated on procedure via verbal mode. Patient verbalized understanding.     ICEBOAT    I: Patient was identified using 2 identifiers  C:  Consent Signed Yes  E: Equipment preventative maintenance is current and dialysis delivery system OK to use  B: Hepatitis B Surface result    Latest Reference Range & Units 01/08/25 11:51 01/09/25 01:51   Hep B Surface Agn Nonreactive  Nonreactive    Hepatitis B Surface Antibody Instrument Value <8.5 m[IU]/mL  <3.50     O: Dialysis orders present and complete prior to treatment  A: Vascular access verified and assessed prior to treatment  T: Treatment was performed at a clinically appropriate time  ?: Patient was allowed to ask questions and address concerns prior to treatment  Machine water alarm in place and functioning. Transducer pods intact and checked every 15min.   Pt returned via bed.  Chlorine/Chloramine water system checked every 4 hours.  Outpatient Dialysis at [facility]    Patient repositioned every 2 hours during the  treatment.  Post treatment report given     Please remove patient dressing on AVF and AVG needle sites 24 hours after dialysis. If leaking occurs please apply a Band-Aid.
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
## 2025-01-10 LAB
ALBUMIN SERPL BCG-MCNC: 2.8 G/DL (ref 3.5–5.2)
ANION GAP SERPL CALCULATED.3IONS-SCNC: 13 MMOL/L (ref 7–15)
BUN SERPL-MCNC: 46.4 MG/DL (ref 8–23)
CALCIUM SERPL-MCNC: 8.7 MG/DL (ref 8.8–10.4)
CHLORIDE SERPL-SCNC: 102 MMOL/L (ref 98–107)
CREAT SERPL-MCNC: 6.5 MG/DL (ref 0.51–0.95)
EGFRCR SERPLBLD CKD-EPI 2021: 6 ML/MIN/1.73M2
ERYTHROCYTE [DISTWIDTH] IN BLOOD BY AUTOMATED COUNT: 14 % (ref 10–15)
GLUCOSE BLDC GLUCOMTR-MCNC: 71 MG/DL (ref 70–99)
GLUCOSE BLDC GLUCOMTR-MCNC: 74 MG/DL (ref 70–99)
GLUCOSE BLDC GLUCOMTR-MCNC: 79 MG/DL (ref 70–99)
GLUCOSE BLDC GLUCOMTR-MCNC: 81 MG/DL (ref 70–99)
GLUCOSE BLDC GLUCOMTR-MCNC: 88 MG/DL (ref 70–99)
GLUCOSE BLDC GLUCOMTR-MCNC: 90 MG/DL (ref 70–99)
GLUCOSE SERPL-MCNC: 80 MG/DL (ref 70–99)
HCO3 SERPL-SCNC: 21 MMOL/L (ref 22–29)
HCT VFR BLD AUTO: 23.1 % (ref 35–47)
HGB BLD-MCNC: 7.5 G/DL (ref 11.7–15.7)
MCH RBC QN AUTO: 30.9 PG (ref 26.5–33)
MCHC RBC AUTO-ENTMCNC: 32.5 G/DL (ref 31.5–36.5)
MCV RBC AUTO: 95 FL (ref 78–100)
PHOSPHATE SERPL-MCNC: 4.8 MG/DL (ref 2.5–4.5)
PLATELET # BLD AUTO: 125 10E3/UL (ref 150–450)
POTASSIUM SERPL-SCNC: 3.6 MMOL/L (ref 3.4–5.3)
RBC # BLD AUTO: 2.43 10E6/UL (ref 3.8–5.2)
SODIUM SERPL-SCNC: 136 MMOL/L (ref 135–145)
VANCOMYCIN SERPL-MCNC: 19 UG/ML
WBC # BLD AUTO: 3.5 10E3/UL (ref 4–11)

## 2025-01-10 PROCEDURE — 999N000157 HC STATISTIC RCP TIME EA 10 MIN

## 2025-01-10 PROCEDURE — 250N000011 HC RX IP 250 OP 636: Performed by: INTERNAL MEDICINE

## 2025-01-10 PROCEDURE — 250N000013 HC RX MED GY IP 250 OP 250 PS 637: Performed by: STUDENT IN AN ORGANIZED HEALTH CARE EDUCATION/TRAINING PROGRAM

## 2025-01-10 PROCEDURE — 99232 SBSQ HOSP IP/OBS MODERATE 35: CPT | Performed by: INTERNAL MEDICINE

## 2025-01-10 PROCEDURE — 80202 ASSAY OF VANCOMYCIN: CPT | Performed by: INTERNAL MEDICINE

## 2025-01-10 PROCEDURE — 258N000003 HC RX IP 258 OP 636: Performed by: INTERNAL MEDICINE

## 2025-01-10 PROCEDURE — 200N000001 HC R&B ICU

## 2025-01-10 PROCEDURE — 250N000009 HC RX 250: Performed by: STUDENT IN AN ORGANIZED HEALTH CARE EDUCATION/TRAINING PROGRAM

## 2025-01-10 PROCEDURE — 99291 CRITICAL CARE FIRST HOUR: CPT | Mod: GC | Performed by: INTERNAL MEDICINE

## 2025-01-10 PROCEDURE — 82435 ASSAY OF BLOOD CHLORIDE: CPT | Performed by: INTERNAL MEDICINE

## 2025-01-10 PROCEDURE — 82040 ASSAY OF SERUM ALBUMIN: CPT | Performed by: INTERNAL MEDICINE

## 2025-01-10 PROCEDURE — 94003 VENT MGMT INPAT SUBQ DAY: CPT

## 2025-01-10 PROCEDURE — 85014 HEMATOCRIT: CPT | Performed by: STUDENT IN AN ORGANIZED HEALTH CARE EDUCATION/TRAINING PROGRAM

## 2025-01-10 PROCEDURE — 36415 COLL VENOUS BLD VENIPUNCTURE: CPT | Performed by: INTERNAL MEDICINE

## 2025-01-10 PROCEDURE — 94640 AIRWAY INHALATION TREATMENT: CPT | Mod: 76

## 2025-01-10 PROCEDURE — 634N000001 HC RX 634: Mod: JZ | Performed by: INTERNAL MEDICINE

## 2025-01-10 PROCEDURE — 250N000013 HC RX MED GY IP 250 OP 250 PS 637: Performed by: INTERNAL MEDICINE

## 2025-01-10 PROCEDURE — 90937 HEMODIALYSIS REPEATED EVAL: CPT

## 2025-01-10 PROCEDURE — 250N000011 HC RX IP 250 OP 636: Performed by: NURSE PRACTITIONER

## 2025-01-10 PROCEDURE — 94640 AIRWAY INHALATION TREATMENT: CPT

## 2025-01-10 PROCEDURE — 250N000011 HC RX IP 250 OP 636: Performed by: STUDENT IN AN ORGANIZED HEALTH CARE EDUCATION/TRAINING PROGRAM

## 2025-01-10 PROCEDURE — 999N000009 HC STATISTIC AIRWAY CARE

## 2025-01-10 RX ORDER — HEPARIN SODIUM 1000 [USP'U]/ML
500 INJECTION, SOLUTION INTRAVENOUS; SUBCUTANEOUS CONTINUOUS
Status: DISCONTINUED | OUTPATIENT
Start: 2025-01-10 | End: 2025-01-14

## 2025-01-10 RX ADMIN — IPRATROPIUM BROMIDE AND ALBUTEROL SULFATE 3 ML: .5; 3 SOLUTION RESPIRATORY (INHALATION) at 16:44

## 2025-01-10 RX ADMIN — SEVELAMER CARBONATE 800 MG: 800 TABLET, FILM COATED ORAL at 08:38

## 2025-01-10 RX ADMIN — IPRATROPIUM BROMIDE AND ALBUTEROL SULFATE 3 ML: .5; 3 SOLUTION RESPIRATORY (INHALATION) at 19:47

## 2025-01-10 RX ADMIN — Medication 5 ML: at 08:39

## 2025-01-10 RX ADMIN — TACROLIMUS: 1 OINTMENT TOPICAL at 08:39

## 2025-01-10 RX ADMIN — PROPOFOL 30 MCG/KG/MIN: 10 INJECTION, EMULSION INTRAVENOUS at 19:37

## 2025-01-10 RX ADMIN — ACETAMINOPHEN 650 MG: 325 TABLET, FILM COATED ORAL at 08:38

## 2025-01-10 RX ADMIN — Medication 10 MG: at 01:10

## 2025-01-10 RX ADMIN — SODIUM CHLORIDE 200 ML: 9 INJECTION, SOLUTION INTRAVENOUS at 18:15

## 2025-01-10 RX ADMIN — Medication 10 MG: at 14:21

## 2025-01-10 RX ADMIN — HEPARIN SODIUM 500 UNITS/HR: 1000 INJECTION INTRAVENOUS; SUBCUTANEOUS at 18:13

## 2025-01-10 RX ADMIN — CHLORHEXIDINE GLUCONATE 15 ML: 1.2 RINSE ORAL at 21:22

## 2025-01-10 RX ADMIN — CETIRIZINE HYDROCHLORIDE 5 MG: 5 TABLET ORAL at 21:22

## 2025-01-10 RX ADMIN — HEPARIN SODIUM 5000 UNITS: 5000 INJECTION, SOLUTION INTRAVENOUS; SUBCUTANEOUS at 14:44

## 2025-01-10 RX ADMIN — Medication 40 MG: at 08:38

## 2025-01-10 RX ADMIN — Medication 50 MCG: at 08:38

## 2025-01-10 RX ADMIN — HEPARIN SODIUM 500 UNITS: 1000 INJECTION INTRAVENOUS; SUBCUTANEOUS at 18:13

## 2025-01-10 RX ADMIN — PROPOFOL 30 MCG/KG/MIN: 10 INJECTION, EMULSION INTRAVENOUS at 09:25

## 2025-01-10 RX ADMIN — PROPOFOL 40 MCG/KG/MIN: 10 INJECTION, EMULSION INTRAVENOUS at 05:51

## 2025-01-10 RX ADMIN — IPRATROPIUM BROMIDE AND ALBUTEROL SULFATE 3 ML: .5; 3 SOLUTION RESPIRATORY (INHALATION) at 07:43

## 2025-01-10 RX ADMIN — CEFEPIME 1 G: 1 INJECTION, POWDER, FOR SOLUTION INTRAMUSCULAR; INTRAVENOUS at 12:14

## 2025-01-10 RX ADMIN — SODIUM CHLORIDE 250 ML: 9 INJECTION, SOLUTION INTRAVENOUS at 18:14

## 2025-01-10 RX ADMIN — HEPARIN SODIUM 5000 UNITS: 5000 INJECTION, SOLUTION INTRAVENOUS; SUBCUTANEOUS at 21:22

## 2025-01-10 RX ADMIN — SODIUM CHLORIDE 500 ML: 9 INJECTION, SOLUTION INTRAVENOUS at 18:14

## 2025-01-10 RX ADMIN — GABAPENTIN 100 MG: 250 SOLUTION ORAL at 08:38

## 2025-01-10 RX ADMIN — PROPOFOL 40 MCG/KG/MIN: 10 INJECTION, EMULSION INTRAVENOUS at 01:12

## 2025-01-10 RX ADMIN — TACROLIMUS: 1 OINTMENT TOPICAL at 21:33

## 2025-01-10 RX ADMIN — ATORVASTATIN CALCIUM 20 MG: 10 TABLET, FILM COATED ORAL at 21:21

## 2025-01-10 RX ADMIN — CHLORHEXIDINE GLUCONATE 15 ML: 1.2 RINSE ORAL at 08:38

## 2025-01-10 RX ADMIN — HEPARIN SODIUM 5000 UNITS: 5000 INJECTION, SOLUTION INTRAVENOUS; SUBCUTANEOUS at 05:52

## 2025-01-10 RX ADMIN — EPOETIN ALFA-EPBX 10000 UNITS: 10000 INJECTION, SOLUTION INTRAVENOUS; SUBCUTANEOUS at 18:12

## 2025-01-10 RX ADMIN — PROPOFOL 30 MCG/KG/MIN: 10 INJECTION, EMULSION INTRAVENOUS at 14:19

## 2025-01-10 RX ADMIN — Medication 10 MG: at 00:55

## 2025-01-10 RX ADMIN — SERTRALINE HYDROCHLORIDE 75 MG: 50 TABLET ORAL at 21:21

## 2025-01-10 RX ADMIN — IPRATROPIUM BROMIDE AND ALBUTEROL SULFATE 3 ML: .5; 3 SOLUTION RESPIRATORY (INHALATION) at 11:26

## 2025-01-10 ASSESSMENT — ACTIVITIES OF DAILY LIVING (ADL)
ADLS_ACUITY_SCORE: 66
ADLS_ACUITY_SCORE: 70
ADLS_ACUITY_SCORE: 71
ADLS_ACUITY_SCORE: 66
ADLS_ACUITY_SCORE: 66
ADLS_ACUITY_SCORE: 71
ADLS_ACUITY_SCORE: 66
ADLS_ACUITY_SCORE: 71
ADLS_ACUITY_SCORE: 66
ADLS_ACUITY_SCORE: 66
ADLS_ACUITY_SCORE: 71
ADLS_ACUITY_SCORE: 66
ADLS_ACUITY_SCORE: 71
ADLS_ACUITY_SCORE: 66
ADLS_ACUITY_SCORE: 66
ADLS_ACUITY_SCORE: 71
ADLS_ACUITY_SCORE: 71

## 2025-01-10 NOTE — PLAN OF CARE
Goal Outcome Evaluation:      Plan of Care Reviewed With: child    Overall Patient Progress: no changeOverall Patient Progress: no change    Outcome Evaluation: Assumed cares from 0945 when patient arrived to unit until 1900. Pt inconsistently follows commands. Meeting RASS goal 0 to -1 with the use of prop. Febrile at 100.6 this shift; PRN Tylenol given. Weak pulses. CXR and ABD xrays done this shift. Vent rate dropped from 18 to 16 post VBG. Waiting on tube feed formula to start nutrition via OG. No BM this shift. Pt is anuric. Possible HD tomorrow. Per Dr. Slater, pt's HD was supposed to run for 3.5 hours, was only able to tolerate approximately 1 hour.      Problem: Adult Inpatient Plan of Care  Goal: Readiness for Transition of Care  Outcome: Not Progressing     Problem: Gas Exchange Impaired  Goal: Optimal Gas Exchange  Outcome: Not Progressing  Intervention: Optimize Oxygenation and Ventilation  Recent Flowsheet Documentation  Taken 1/9/2025 1800 by Dina Aden, RN  Head of Bed (HOB) Positioning: HOB at 30 degrees  Taken 1/9/2025 1600 by Dina Aden, RN  Head of Bed (HOB) Positioning: HOB at 30 degrees  Taken 1/9/2025 1400 by Dina Aden, RN  Head of Bed (HOB) Positioning: HOB at 30 degrees  Taken 1/9/2025 1200 by Dina Aden, RN  Head of Bed (HOB) Positioning: HOB at 30 degrees  Taken 1/9/2025 0945 by Dina Aden, RN  Head of Bed (HOB) Positioning: HOB at 30 degrees     Problem: Adult Inpatient Plan of Care  Goal: Optimal Comfort and Wellbeing  Outcome: Progressing  Intervention: Provide Person-Centered Care  Recent Flowsheet Documentation  Taken 1/9/2025 1600 by Dina Aden, RN  Trust Relationship/Rapport:   care explained   reassurance provided  Taken 1/9/2025 1200 by Dina Aden, RN  Trust Relationship/Rapport:   care explained   reassurance provided     Problem: Restraint, Nonviolent  Goal: Absence of Harm or Injury  Outcome: Progressing  Intervention: Implement Least Restrictive Safety  Strategies  Recent Flowsheet Documentation  Taken 1/9/2025 1600 by Dina Aden, RN  Medical Device Protection:   IV pole/bag removed from visual field   tubing secured  Taken 1/9/2025 1200 by Dina Aden, RN  Medical Device Protection:   IV pole/bag removed from visual field   tubing secured  Intervention: Protect Dignity, Rights and Personal Wellbeing  Recent Flowsheet Documentation  Taken 1/9/2025 1600 by Dina Aden, RN  Trust Relationship/Rapport:   care explained   reassurance provided  Taken 1/9/2025 1200 by Dina Aden, SHANTA  Trust Relationship/Rapport:   care explained   reassurance provided  Intervention: Protect Skin and Joint Integrity  Recent Flowsheet Documentation  Taken 1/9/2025 1800 by Dina Aden, RN  Body Position:   turned   heels elevated   upper extremity elevated  Taken 1/9/2025 1600 by Dina Aden, RN  Body Position:   turned   heels elevated   upper extremity elevated  Taken 1/9/2025 1400 by Dina Aden, RN  Body Position:   turned   heels elevated   upper extremity elevated  Taken 1/9/2025 1200 by Dina Aden, RN  Body Position:   turned   heels elevated   upper extremity elevated  Taken 1/9/2025 0945 by Dina Aden, RN  Body Position:   turned   heels elevated   upper extremity elevated

## 2025-01-10 NOTE — PLAN OF CARE
Goal Outcome Evaluation:      Plan of Care Reviewed With: patient, child, sibling    Overall Patient Progress: improvingOverall Patient Progress: improving    Outcome Evaluation: Sedated. Answers to simple questions. Calm and cooperative. Pupils equal and reactive. LS diminished. On full vent support. SB. BP WNL. Anuric. HD run this afternoon. No BM this shift. On TF via OG. Scabs on bilateral buttocks. Rash and excoriation under breast and groin. PIVx2. Afebrile. PS after HD and possible extubation. Will continue to monitor and support.      Problem: Mechanical Ventilation Invasive  Goal: Effective Communication  Outcome: Progressing  Goal: Optimal Device Function  Outcome: Progressing  Intervention: Optimize Device Care and Function  Recent Flowsheet Documentation  Taken 1/10/2025 1400 by Dorie Munoz RN  Oral Care: swabbed with antiseptic solution  Taken 1/10/2025 1200 by Dorie Munoz RN  Oral Care: swabbed with antiseptic solution  Taken 1/10/2025 1000 by Dorie Munoz RN  Oral Care:   lip/mouth moisturizer applied   swabbed with antiseptic solution  Taken 1/10/2025 0800 by Dorie Munoz RN  Oral Care:   lip/mouth moisturizer applied   swabbed with antiseptic solution  Goal: Mechanical Ventilation Liberation  Outcome: Progressing  Intervention: Promote Extubation and Mechanical Ventilation Liberation  Recent Flowsheet Documentation  Taken 1/10/2025 0800 by Dorie Munoz RN  Sleep/Rest Enhancement: relaxation techniques promoted  Medication Review/Management: medications reviewed  Goal: Optimal Nutrition Delivery  Outcome: Progressing  Intervention: Optimize Nutrition Delivery  Recent Flowsheet Documentation  Taken 1/10/2025 0800 by Dorie Munoz RN  Nutrition Support Management: weight trending reviewed  Goal: Absence of Device-Related Skin and Tissue Injury  Outcome: Progressing  Goal: Absence of Ventilator-Induced Lung Injury  Outcome: Progressing  Intervention: Prevent Ventilator-Associated  Pneumonia  Recent Flowsheet Documentation  Taken 1/10/2025 1400 by Dorie Munoz, RN  Oral Care: swabbed with antiseptic solution  Head of Bed (HOB) Positioning: HOB at 30 degrees  Taken 1/10/2025 1200 by Dorie Munoz RN  Oral Care: swabbed with antiseptic solution  Head of Bed (HOB) Positioning: HOB at 30 degrees  Taken 1/10/2025 1000 by Dorie Munoz, RN  Oral Care:   lip/mouth moisturizer applied   swabbed with antiseptic solution  Head of Bed (HOB) Positioning: HOB at 30 degrees  Taken 1/10/2025 0800 by Dorie Munoz, RN  Oral Care:   lip/mouth moisturizer applied   swabbed with antiseptic solution  Head of Bed (HOB) Positioning: HOB at 30 degrees

## 2025-01-10 NOTE — PROGRESS NOTES
ICU Progress Note    Date of Service: 01/10/25    A/P:  Supriya Herr is a 74 year old female with a history of  ESRD on HD, CHF, COPD, DM type II, hypertension who was admitted 1/8 with influenza A. This morning during dialysis, an RRT was called for hypoxia and she was intubated. The patient was admitted to the ICU for medical management.     I have personally reviewed the daily labs, imaging studies, cultures and discussed the case with referring physician and consulting physicians.     Neuro:  Post-intubation sedation  Likely baseline cognitive impairment  - Propofol for sedation, wean as able today  - PTA Zoloft, gabapentin     CV:  Shock, septic vs polypharmacy  Hx of CHF  Hx of HTN, HLD  - MAP goal > 65, not requiring pressors  - Hold PTA amlodipine, carvedilol, furosemide     Pulm:  Acute hypoxemic respiratory failure  Influenza A  Hx of COPD, JONE  ? PE - low likelihood  ? Community acquired pneumonia  - low tidal volume ventilation  - BLE Doppler US negative 1/9  - Continue cefepime/vanc until after cultures result     GI:  No active issues   - RD to manage TF     Renal:  ESRD on HD   - Nephrology consult, appreciate recs   - HD stopped mid-session 1/9   - Anticipate attempting HD again today   - Monitor UOP, Cr, I/O     ID:  Influenza A pneumonia  ? Community acquired pneumonia   - Complete infectious work-up including sputum culture and UA pending   - Oseltamivir started on admission, continue   - Continue cefepime/vanc for possible community acquired pneumonia until cultures result   - Urine legionella antigen     Heme:  Chronic anemia   - Hgb baseline 9-10, currently 7.5   - Transfuse for Hgb < 7     Endo:  Hx of DMII   - Hold PTA glargine for now, will restart if blood glucose poorly controlled   - SSI     Integumentary/MSK  Right foot ulcer  Coccyx pressure ulcer  Hx of left AKA   - Follows with podiatry   - WOC consult    Clinically Significant Risk Factors         # Hyponatremia: Lowest Na = 134  "mmol/L in last 2 days, will monitor as appropriate       # Hypoalbuminemia: Lowest albumin = 2.8 g/dL at 1/10/2025  5:02 AM, will monitor as appropriate   # Thrombocytopenia: Lowest platelets = 125 in last 2 days, will monitor for bleeding   # Hypertension: Noted on problem list     # Acute Hypoxic Respiratory Failure: Documented O2 saturation < 90%. Continue supplemental oxygen as needed         # Obesity: Estimated body mass index is 33.98 kg/m  as calculated from the following:    Height as of this encounter: 1.702 m (5' 7\").    Weight as of this encounter: 98.4 kg (216 lb 14.9 oz)., PRESENT ON ADMISSION                   PPX  1. DVT: Heparin TID   2. VAP: HOB 30 degrees, chlorhexidine rinse  3. Stress Ulcer: PPI  4. Restraints: Nonviolent soft two point restraints required and necessary for patient safety and continued cares and good effect as patient continues to pull at necessary lines, tubes despite education and distraction. Will readdress daily.   5. Wound care - per unit routine   6. Feeding - TF  7. Family updated via phone.    Interval Hx:  No acute events overnight. Remains intubated.    Unable to obtain ROS 2/2 sedation/intubation.     /41   Pulse 52   Temp 97.3  F (36.3  C) (Axillary)   Resp 16   Ht 1.702 m (5' 7\")   Wt 98.4 kg (216 lb 14.9 oz)   LMP  (LMP Unknown)   SpO2 97%   BMI 33.98 kg/m      Exam:  Constitutional: Sedated, NAD  Head: Normocephalic.  Cardiovascular: Regular rate and rhythm on monitor  Respiratory: Intubated, equal chest rise  Gastrointestinal: Soft, non-distended  Musculoskeletal: Left AKA, otherwise extremities normal- no gross deformities noted  Skin: Right foot ulcer without surrounding erythema  Neurologic: Sedated, GCS 3T    FiO2 (%): 30 %, Resp: 16, Vent Mode: CMV/AC, Resp Rate (Set): 16 breaths/min, Tidal Volume (Set, mL): 480 mL, PEEP (cm H2O): 5 cmH2O, Resp Rate (Set): 16 breaths/min, Tidal Volume (Set, mL): 480 mL, PEEP (cm H2O): 5 cmH2O      Intake/Output " Summary (Last 24 hours) at 1/10/2025 0759  Last data filed at 1/10/2025 0600  Gross per 24 hour   Intake 565.37 ml   Output 0 ml   Net 565.37 ml       Labs: reviewed    Imaging: reviewed    Billing: Patient is critically ill. Total critical care time today, excluding procedures, was 40 minutes.    Discussed with staff, Dr. Renner.    Truman Moreland  Surgical Critical Care Fellow

## 2025-01-10 NOTE — PHARMACY-VANCOMYCIN DOSING SERVICE
"Pharmacy Vancomycin Note  Date of Service January 10, 2025  Patient's  1950   74 year old, female    Indication: Community Acquired Pneumonia  Day of Therapy: 2  Current vancomycin regimen:  Per levels - on HD  Current vancomycin monitoring method: Renal Replacement Therapy  Current vancomycin therapeutic monitoring goal: 15-20 mg/L    Current estimated CrCl = Estimated Creatinine Clearance: 9 mL/min (A) (based on SCr of 6.5 mg/dL (H)).    Creatinine for last 3 days  2025:  7:42 AM Creatinine 4.71 mg/dL  2025:  7:34 AM Creatinine 6.61 mg/dL  1/10/2025:  5:02 AM Creatinine 6.50 mg/dL    Recent Vancomycin Levels (past 3 days)  1/10/2025:  5:02 AM Vancomycin 19.0 ug/mL    Vancomycin IV Administrations (past 72 hours)                     vancomycin (VANCOCIN) 2,000 mg in 0.9% NaCl 520 mL intermittent infusion (mg) 2,000 mg New Bag 25 1519                    Nephrotoxins and other renal medications (From now, onward)      Start     Dose/Rate Route Frequency Ordered Stop    25  [Held by provider]  furosemide (LASIX) tablet 80 mg        (On hold since yesterday at 0958 until manually unheld; held by Truman Moreland MDHold Reason: Other)   Note to Pharmacy: PTA Sig:Take 1 tablet (40 mg) by mouth daily And take one tablet of 80mg to make total of 120mg twice a day  Patient taking differently: 80mg to make total of 120mg twice a day on dialysis days and 80 mg twice daily on non dialysis days     Placed in \"And\" Linked Group    80 mg Oral Once per day on 25 1058      25 1243  vancomycin place cardoso - receiving intermittent dosing         1 each Intravenous SEE ADMIN INSTRUCTIONS 25 1243      25 1030  norepinephrine (LEVOPHED) 4 mg in  mL PERIPHERAL infusion         0.01-0.125 mcg/kg/min × 96.3 kg  3.6-45.1 mL/hr  Intravenous CONTINUOUS 25 1011      25 0800  [Held by provider]  furosemide (LASIX) tablet 40 mg        (On hold " "since yesterday at 0958 until manually unheld; held by Truman Moreland MDHold Reason: Other)   Note to Pharmacy: PTA Sig:Take 1 tablet (40 mg) by mouth daily And take one tablet of 80mg to make total of 120mg twice a day  Patient taking differently: 80mg to make total of 120mg twice a day on dialysis days and 80 mg twice daily on non dialysis days     Placed in \"And\" Linked Group    40 mg Oral Once per day on Tuesday Thursday Saturday 01/08/25 1058      01/08/25 2000  [Held by provider]  furosemide (LASIX) tablet 80 mg        (On hold since yesterday at 0958 until manually unheld; held by Truman Moreland MDHold Reason: Other)   Note to Pharmacy: Patient taking differently: Take 40 mg in a.m. and 80 mg in p.m. on dialysis days and 80 mg twice daily on non dialysis days    80 mg Oral 2 times per day on Sunday Monday Wednesday Friday 01/08/25 1058                 Contrast Orders - past 72 hours (72h ago, onward)      None            Interpretation of levels and current regimen:  Vancomycin level is reflective of therapeutic level    Has serum creatinine changed greater than 50% in last 72 hours: No    Urine output:  anuric    Renal Function: ESRD on Dialysis    Plan:  MRSA swab negative., Provider stopped vancomycin upon recommendation of AMT team, no further doses necessary. May dialyze today, waiting on nephrology input.     Cyndi Moya, [facility], PharmD    "

## 2025-01-10 NOTE — PLAN OF CARE
[facility]     ICU Nursing Progress Note     NEURO: Sedated, increased throughout shift due to ongoing agitation. Inconsistently following commands. Not opening eyes spontaneously. Moves all extremities spontaneously and with stimulation. Pupils PERRL. Afebrile.      CV: SB 50's     RESP: Remains intubated - no vent changes.      GI: Obese. TF started and to be increased to 20mL/hr @ 0900.      : Anuric.  Has orders      SKIN: Left buttock wound covered with sacral mepi. L AKA. Fungal appearing rash under bilat breast and pannus. Interdry applied. Old right fistula.      B's.      GTTS: Prop @ 50.      Lines/tubes/drains: PIVx2 - will need vascular consult if we lose IV's. Left fistula dressing in place.     Goal Outcome Evaluation:      Plan of Care Reviewed With: patient    Overall Patient Progress: no changeOverall Patient Progress: no change           Problem: Gas Exchange Impaired  Goal: Optimal Gas Exchange  Outcome: Progressing  Intervention: Optimize Oxygenation and Ventilation  Recent Flowsheet Documentation  Taken 1/10/2025 0400 by Sisi Solorio, RN  Head of Bed (HOB) Positioning: HOB at 30 degrees  Taken 1/10/2025 0200 by Sisi Solorio, RN  Head of Bed (HOB) Positioning: HOB at 30 degrees  Taken 1/10/2025 0000 by Sisi Solorio, RN  Head of Bed (HOB) Positioning: HOB at 30 degrees

## 2025-01-10 NOTE — PROGRESS NOTES
Renal Medicine Progress Note            Assessment/Plan:     Supriya Herr is a 74 year old female with ESRD who was admitted on 1/8/2025.      1) Influenza A with hypoxic resp failure.  On supplemental O2.  Oseltamivir started at ESRD dose.  More hypoxic perhaps due to influenza getting  worse.     2) ESRD due to T2DM/HTN.  She normally runs at [facility] on T-Th-S under direction of Dr. Basilio.  She has a CLEM, 3.5 H, .  TW ?     3) Anemia: HGB 7.5.  She is on Mircera as outpt. I did order EPO 10 K units today.       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  Vit D 2000 units daily.  Renvela 1600 mg TID c meals and 800 mg with snacks.       Plan:     HD today - note she is off schedule    EPO today  Next run will be Monday  We will need to get on schedule at some point.            Interval History:   Unable.  Pt is intubated/sedated.          Medications and Allergies:     Current Facility-Administered Medications   Medication Dose Route Frequency Provider Last Rate Last Admin    - MEDICATION INSTRUCTIONS for Dialysis Patients -   Does not apply See Admin Instructions Dusty Prieto MD        [Held by provider] amLODIPine (NORVASC) tablet 5 mg  5 mg Oral BID Marcell Grubbs PA-C   5 mg at 01/08/25 2217    atorvastatin (LIPITOR) tablet 20 mg  20 mg Oral or Feeding Tube QPM Kamryn Peguero MD   20 mg at 01/09/25 2043    B and C vitamin Complex with folic acid (NEPHRONEX) liquid 5 mL  5 mL Per Feeding Tube Daily Truman Moreland MD   5 mL at 01/10/25 0839    [Held by provider] carvedilol (COREG) tablet 25 mg  25 mg Oral BID w/meals Marcell Grubbs PA-C   25 mg at 01/08/25 1905    ceFEPIme (MAXIPIME) 1 g vial to attach to  mL bag for ADULTS or NS 50 mL bag for PEDS  1 g Intravenous Q24H Truman Moreland MD   1 g at 01/10/25 1214    cetirizine (zyrTEC) tablet 5 mg  5 mg Oral or Feeding Tube At Bedtime  Kamryn Peguero MD   5 mg at 01/09/25 2101    chlorhexidine (PERIDEX) 0.12 % solution 15 mL  15 mL Mouth/Throat Q12H Truman Moreland MD   15 mL at 01/10/25 0838    epoetin candy-epbx (RETACRIT) injection 10,000 Units  10,000 Units Intravenous Once Dusty Prieto MD        [Held by provider] furosemide (LASIX) tablet 40 mg  40 mg Oral Once per day on Tuesday Thursday Saturday Marcell Grubbs PA-C        And    [Held by provider] furosemide (LASIX) tablet 80 mg  80 mg Oral Once per day on Tuesday Thursday Saturday Marcell Grubbs PA-C        [Held by provider] furosemide (LASIX) tablet 80 mg  80 mg Oral 2 times per day on Sunday Monday Wednesday Friday Marcell Grubbs PA-C        gabapentin (NEURONTIN) solution 100 mg  100 mg Oral or Feeding Tube Daily Kamryn Peguero MD   100 mg at 01/10/25 0838    heparin (porcine) injection  500 Units Hemodialysis Machine OR IV Push Once in dialysis/CRRT Dusty Prieto MD        heparin ANTICOAGULANT injection 5,000 Units  5,000 Units Subcutaneous Q8H Marcell Grubbs PA-C   5,000 Units at 01/10/25 1444    insulin aspart (NovoLOG) injection (RAPID ACTING)  1-7 Units Subcutaneous Q4H John Renner MD        [Held by provider] insulin glargine (LANTUS PEN) injection 12 Units  12 Units Subcutaneous At Bedtime Marcell Grubbs PA-C   12 Units at 01/08/25 2212    ipratropium - albuterol 0.5 mg/2.5 mg/3 mL (DUONEB) neb solution 3 mL  3 mL Nebulization 4x daily Marcell Grubbs PA-C   3 mL at 01/10/25 1126    oseltamivir (TAMIFLU) suspension 30 mg  30 mg Oral or Feeding Tube Q48H Kamryn Peguero MD   30 mg at 01/09/25 1349    pantoprazole (PROTONIX) 2 mg/mL suspension 40 mg  40 mg Per Feeding Tube QAM Truman Nieves MD   40 mg at 01/10/25 0838    Or    pantoprazole (PROTONIX) IV push injection 40 mg  40 mg Intravenous Truman De La Cruz MD        sertraline (ZOLOFT) tablet 75 mg  75 mg Oral or Feeding Tube QPM Kamryn Peguero,  "MD   75 mg at 01/09/25 2042    sevelamer carbonate (RENVELA) tablet 800 mg  800 mg Oral or Feeding Tube TID w/meals Kamryn Peguero MD   800 mg at 01/10/25 0838    sodium chloride (PF) 0.9% PF flush 3 mL  3 mL Intracatheter Q8H Marcell Grubbs PA-C   3 mL at 01/10/25 1214    sodium chloride 0.9% BOLUS 200 mL  200 mL Hemodialysis Machine Once Dusty Prieto MD        sodium chloride 0.9% BOLUS 250 mL  250 mL Intravenous Once in dialysis/CRRT Dusty Prieto MD        sodium chloride 0.9% BOLUS 500 mL  500 mL Hemodialysis Machine Once Dusty Prieto MD        tacrolimus (PROTOPIC) 0.1 % ointment   Topical BID Marcell Grubbs PA-C   Given at 01/10/25 0839    vancomycin place cardoso - receiving intermittent dosing  1 each Intravenous See Admin Instructions Kamryn Peguero MD        vitamin D3 (CHOLECALCIFEROL) tablet 50 mcg  50 mcg Oral or Feeding Tube Daily Kamryn Peguero MD   50 mcg at 01/10/25 0838        Allergies   Allergen Reactions    Ampicillin-Sulbactam Sodium Rash     No evidence SJS, but very uncomfortable and precipitated multiple provider visits. Would not use penicillins again if other options available.     Penicillins Rash            Physical Exam:   Vitals were reviewed  /55 (BP Location: Right leg)   Pulse 58   Temp 97.5  F (36.4  C) (Axillary)   Resp 16   Ht 1.702 m (5' 7\")   Wt 98.4 kg (216 lb 14.9 oz)   LMP  (LMP Unknown)   SpO2 93%   BMI 33.98 kg/m      Wt Readings from Last 3 Encounters:   01/10/25 98.4 kg (216 lb 14.9 oz)   06/14/24 101.1 kg (222 lb 14.2 oz)   06/03/24 101.1 kg (222 lb 14.2 oz)       Intake/Output Summary (Last 24 hours) at 1/10/2025 1544  Last data filed at 1/10/2025 1400  Gross per 24 hour   Intake 1174.27 ml   Output --   Net 1174.27 ml       GENERAL APPEARANCE: intubated, sedated  HEENT:  Eyes/ears/nose/neck grossly normal  RESP: + vent sounds, lungs cta b c good efforts, no crackles, rhonchi or wheezes  CV: RRR, nl S1/S2, no " m/r/g   ABDOMEN: o/s/nt/nd, bs present  EXTREMITIES/SKIN: no c/c/rashes/lesions; tr edema  NEURO:  sedated           Data:     BMP  Recent Labs   Lab 01/10/25  1213 01/10/25  0928 01/10/25  0502 01/10/25  0403 01/09/25  0744 01/09/25  0734 01/08/25  1836 01/08/25  0742   NA  --   --  136  --   --  134*  --  135   POTASSIUM  --   --  3.6  --   --  5.2  --  4.7   CHLORIDE  --   --  102  --   --  100  --  98   JODY  --   --  8.7*  --   --  8.8  --  9.3   CO2  --   --  21*  --   --  22  --  26   BUN  --   --  46.4*  --   --  47.4*  --  27.7*   CR  --   --  6.50*  --   --  6.61*  --  4.71*   GLC 79 88 80 71   < > 102*   < > 155*    < > = values in this interval not displayed.     CBC  Recent Labs   Lab 01/10/25  0502 01/09/25  0734 01/08/25  0742   WBC 3.5* 5.8 4.0   HGB 7.5* 8.6* 8.8*   HCT 23.1* 28.2* 29.3*   MCV 95 100 101*   * 135* 148*     Lab Results   Component Value Date    AST 26 01/08/2025    ALT 23 01/08/2025    ALKPHOS 72 01/08/2025    BILITOTAL 0.2 01/08/2025     Lab Results   Component Value Date    INR 1.11 04/24/2024       Attestation:  I have reviewed today's vital signs, notes, medications, labs and imaging.    Dusty Prieto MD  [facility] - Nephrology  [phone]

## 2025-01-10 NOTE — PLAN OF CARE
[id number]    Patient is intubated and sedated on propofol. Will follow commands. TF started at 2100. BS 57. 25mL of D50 given. Patient is anuric.

## 2025-01-10 NOTE — PROGRESS NOTES
[facility] ICU RESPIRATORY NOTE        Date of Admission: 1/8/2025    Date of Intubation (most recent): 1/9/25    Reason for Mechanical Ventilation: Airway protection    Number of Days on Mechanical Ventilation: 1    Met Criteria for Spontaneous Breathing Trial: No    Reason for No Spontaneous Breathing Trial: Per MD    Bite Block: No    Significant Events Today: Sputum sample sent    ABG Results:   Recent Labs   Lab 01/09/25  1540 01/08/25  1151   O2PER 30 5         Current Vent Settings: FiO2 (%): 30 %, Resp: 16, Vent Mode: CMV/AC, Resp Rate (Set): 16 breaths/min, Tidal Volume (Set, mL): 480 mL, PEEP (cm H2O): 5 cmH2O, Resp Rate (Set): 16 breaths/min, Tidal Volume (Set, mL): 480 mL, PEEP (cm H2O): 5 cmH2O    Skin Assessment: Intact    Plan: Continue full vent support with daily wean assessment    Driss Arias, RRT on 1/10/2025 at 4:12 AM

## 2025-01-11 LAB
ANION GAP SERPL CALCULATED.3IONS-SCNC: 9 MMOL/L (ref 7–15)
BACTERIA SPT CULT: NORMAL
BUN SERPL-MCNC: 22.2 MG/DL (ref 8–23)
CALCIUM SERPL-MCNC: 8.8 MG/DL (ref 8.8–10.4)
CHLORIDE SERPL-SCNC: 100 MMOL/L (ref 98–107)
CREAT SERPL-MCNC: 3.48 MG/DL (ref 0.51–0.95)
EGFRCR SERPLBLD CKD-EPI 2021: 13 ML/MIN/1.73M2
ERYTHROCYTE [DISTWIDTH] IN BLOOD BY AUTOMATED COUNT: 14.1 % (ref 10–15)
GLUCOSE BLDC GLUCOMTR-MCNC: 110 MG/DL (ref 70–99)
GLUCOSE BLDC GLUCOMTR-MCNC: 122 MG/DL (ref 70–99)
GLUCOSE BLDC GLUCOMTR-MCNC: 164 MG/DL (ref 70–99)
GLUCOSE BLDC GLUCOMTR-MCNC: 172 MG/DL (ref 70–99)
GLUCOSE BLDC GLUCOMTR-MCNC: 173 MG/DL (ref 70–99)
GLUCOSE BLDC GLUCOMTR-MCNC: 191 MG/DL (ref 70–99)
GLUCOSE SERPL-MCNC: 135 MG/DL (ref 70–99)
GRAM STAIN RESULT: NORMAL
GRAM STAIN RESULT: NORMAL
HCO3 SERPL-SCNC: 28 MMOL/L (ref 22–29)
HCT VFR BLD AUTO: 24.2 % (ref 35–47)
HGB BLD-MCNC: 8.1 G/DL (ref 11.7–15.7)
MCH RBC QN AUTO: 31.3 PG (ref 26.5–33)
MCHC RBC AUTO-ENTMCNC: 33.5 G/DL (ref 31.5–36.5)
MCV RBC AUTO: 93 FL (ref 78–100)
PLATELET # BLD AUTO: 134 10E3/UL (ref 150–450)
POTASSIUM SERPL-SCNC: 3.7 MMOL/L (ref 3.4–5.3)
RBC # BLD AUTO: 2.59 10E6/UL (ref 3.8–5.2)
SODIUM SERPL-SCNC: 137 MMOL/L (ref 135–145)
WBC # BLD AUTO: 4.2 10E3/UL (ref 4–11)

## 2025-01-11 PROCEDURE — 94003 VENT MGMT INPAT SUBQ DAY: CPT

## 2025-01-11 PROCEDURE — 80051 ELECTROLYTE PANEL: CPT | Performed by: STUDENT IN AN ORGANIZED HEALTH CARE EDUCATION/TRAINING PROGRAM

## 2025-01-11 PROCEDURE — 36415 COLL VENOUS BLD VENIPUNCTURE: CPT | Performed by: STUDENT IN AN ORGANIZED HEALTH CARE EDUCATION/TRAINING PROGRAM

## 2025-01-11 PROCEDURE — 99291 CRITICAL CARE FIRST HOUR: CPT | Performed by: INTERNAL MEDICINE

## 2025-01-11 PROCEDURE — 250N000011 HC RX IP 250 OP 636: Performed by: NURSE PRACTITIONER

## 2025-01-11 PROCEDURE — 250N000013 HC RX MED GY IP 250 OP 250 PS 637: Performed by: INTERNAL MEDICINE

## 2025-01-11 PROCEDURE — 80048 BASIC METABOLIC PNL TOTAL CA: CPT | Performed by: STUDENT IN AN ORGANIZED HEALTH CARE EDUCATION/TRAINING PROGRAM

## 2025-01-11 PROCEDURE — 250N000013 HC RX MED GY IP 250 OP 250 PS 637: Performed by: STUDENT IN AN ORGANIZED HEALTH CARE EDUCATION/TRAINING PROGRAM

## 2025-01-11 PROCEDURE — 200N000001 HC R&B ICU

## 2025-01-11 PROCEDURE — 94002 VENT MGMT INPAT INIT DAY: CPT

## 2025-01-11 PROCEDURE — 250N000011 HC RX IP 250 OP 636: Performed by: STUDENT IN AN ORGANIZED HEALTH CARE EDUCATION/TRAINING PROGRAM

## 2025-01-11 PROCEDURE — 250N000012 HC RX MED GY IP 250 OP 636 PS 637: Performed by: INTERNAL MEDICINE

## 2025-01-11 PROCEDURE — 250N000009 HC RX 250: Performed by: STUDENT IN AN ORGANIZED HEALTH CARE EDUCATION/TRAINING PROGRAM

## 2025-01-11 PROCEDURE — 94640 AIRWAY INHALATION TREATMENT: CPT | Mod: 76

## 2025-01-11 PROCEDURE — 85014 HEMATOCRIT: CPT | Performed by: STUDENT IN AN ORGANIZED HEALTH CARE EDUCATION/TRAINING PROGRAM

## 2025-01-11 PROCEDURE — 999N000157 HC STATISTIC RCP TIME EA 10 MIN

## 2025-01-11 PROCEDURE — 94640 AIRWAY INHALATION TREATMENT: CPT

## 2025-01-11 RX ADMIN — TACROLIMUS: 1 OINTMENT TOPICAL at 08:22

## 2025-01-11 RX ADMIN — IPRATROPIUM BROMIDE AND ALBUTEROL SULFATE 3 ML: .5; 3 SOLUTION RESPIRATORY (INHALATION) at 08:09

## 2025-01-11 RX ADMIN — INSULIN ASPART 1 UNITS: 100 INJECTION, SOLUTION INTRAVENOUS; SUBCUTANEOUS at 19:39

## 2025-01-11 RX ADMIN — PROPOFOL 20 MCG/KG/MIN: 10 INJECTION, EMULSION INTRAVENOUS at 11:51

## 2025-01-11 RX ADMIN — OSELTAMIVIR PHOSPHATE 30 MG: 6 FOR SUSPENSION ORAL at 13:47

## 2025-01-11 RX ADMIN — INSULIN ASPART 1 UNITS: 100 INJECTION, SOLUTION INTRAVENOUS; SUBCUTANEOUS at 08:28

## 2025-01-11 RX ADMIN — INSULIN ASPART 1 UNITS: 100 INJECTION, SOLUTION INTRAVENOUS; SUBCUTANEOUS at 15:52

## 2025-01-11 RX ADMIN — CEFEPIME 1 G: 1 INJECTION, POWDER, FOR SOLUTION INTRAMUSCULAR; INTRAVENOUS at 11:51

## 2025-01-11 RX ADMIN — INSULIN ASPART 2 UNITS: 100 INJECTION, SOLUTION INTRAVENOUS; SUBCUTANEOUS at 11:41

## 2025-01-11 RX ADMIN — CHLORHEXIDINE GLUCONATE 15 ML: 1.2 RINSE ORAL at 08:21

## 2025-01-11 RX ADMIN — HEPARIN SODIUM 5000 UNITS: 5000 INJECTION, SOLUTION INTRAVENOUS; SUBCUTANEOUS at 22:21

## 2025-01-11 RX ADMIN — TACROLIMUS: 1 OINTMENT TOPICAL at 19:45

## 2025-01-11 RX ADMIN — PROPOFOL 20 MCG/KG/MIN: 10 INJECTION, EMULSION INTRAVENOUS at 18:21

## 2025-01-11 RX ADMIN — ATORVASTATIN CALCIUM 20 MG: 10 TABLET, FILM COATED ORAL at 19:39

## 2025-01-11 RX ADMIN — Medication 50 MCG: at 08:21

## 2025-01-11 RX ADMIN — GABAPENTIN 100 MG: 250 SOLUTION ORAL at 08:25

## 2025-01-11 RX ADMIN — IPRATROPIUM BROMIDE AND ALBUTEROL SULFATE 3 ML: .5; 3 SOLUTION RESPIRATORY (INHALATION) at 19:53

## 2025-01-11 RX ADMIN — CETIRIZINE HYDROCHLORIDE 5 MG: 5 TABLET ORAL at 22:21

## 2025-01-11 RX ADMIN — PROPOFOL 25 MCG/KG/MIN: 10 INJECTION, EMULSION INTRAVENOUS at 06:08

## 2025-01-11 RX ADMIN — HEPARIN SODIUM 5000 UNITS: 5000 INJECTION, SOLUTION INTRAVENOUS; SUBCUTANEOUS at 13:47

## 2025-01-11 RX ADMIN — PROPOFOL 30 MCG/KG/MIN: 10 INJECTION, EMULSION INTRAVENOUS at 00:58

## 2025-01-11 RX ADMIN — ACETAMINOPHEN 650 MG: 325 TABLET, FILM COATED ORAL at 00:59

## 2025-01-11 RX ADMIN — Medication 40 MG: at 08:21

## 2025-01-11 RX ADMIN — HEPARIN SODIUM 5000 UNITS: 5000 INJECTION, SOLUTION INTRAVENOUS; SUBCUTANEOUS at 05:59

## 2025-01-11 RX ADMIN — SERTRALINE HYDROCHLORIDE 75 MG: 50 TABLET ORAL at 19:39

## 2025-01-11 RX ADMIN — CHLORHEXIDINE GLUCONATE 15 ML: 1.2 RINSE ORAL at 19:39

## 2025-01-11 RX ADMIN — ACETAMINOPHEN 650 MG: 325 TABLET, FILM COATED ORAL at 20:17

## 2025-01-11 RX ADMIN — IPRATROPIUM BROMIDE AND ALBUTEROL SULFATE 3 ML: .5; 3 SOLUTION RESPIRATORY (INHALATION) at 14:54

## 2025-01-11 RX ADMIN — Medication 5 ML: at 08:21

## 2025-01-11 RX ADMIN — IPRATROPIUM BROMIDE AND ALBUTEROL SULFATE 3 ML: .5; 3 SOLUTION RESPIRATORY (INHALATION) at 11:12

## 2025-01-11 ASSESSMENT — ACTIVITIES OF DAILY LIVING (ADL)
ADLS_ACUITY_SCORE: 71
ADLS_ACUITY_SCORE: 66
ADLS_ACUITY_SCORE: 70
ADLS_ACUITY_SCORE: 71
DEPENDENT_IADLS:: CLEANING;LAUNDRY;SHOPPING;MEAL PREPARATION;MEDICATION MANAGEMENT;MONEY MANAGEMENT;TRANSPORTATION
ADLS_ACUITY_SCORE: 70
ADLS_ACUITY_SCORE: 71
ADLS_ACUITY_SCORE: 66
ADLS_ACUITY_SCORE: 70
ADLS_ACUITY_SCORE: 71
ADLS_ACUITY_SCORE: 70
ADLS_ACUITY_SCORE: 70

## 2025-01-11 NOTE — PROGRESS NOTES
ICU Progress Note    Date of Service: 01/11/25    A/P:  Supriya Herr is a 74 year old female with a history of  ESRD on HD, CHF, COPD, DM type II, hypertension who was admitted 1/8 with influenza A. This morning during dialysis, an RRT was called for hypoxia and she was intubated. The patient was admitted to the ICU for medical management.     I have personally reviewed the daily labs, imaging studies, cultures and discussed the case with referring physician and consulting physicians.     Neuro:  Post-intubation sedation  Likely baseline cognitive impairment  - Propofol for sedation, wean as able today  - PTA Zoloft, gabapentin     CV:  Shock, septic vs polypharmacy  Hx of CHF  Hx of HTN, HLD  - MAP goal > 65, not requiring pressors  - Hold PTA amlodipine, carvedilol, furosemide     Pulm:  Acute hypoxemic respiratory failure  Influenza A  Hx of COPD, JONE  ? PE - low likelihood  ? Community acquired pneumonia  - low tidal volume ventilation  - BLE Doppler US negative 1/9  - Continue cefepime/vanc until after cultures result  -failed spontaneous breathing trial today with RSBI 140  FiO2 (%): 30 %, Resp: 26, Vent Mode: CMV/AC, Resp Rate (Set): 16 breaths/min, Tidal Volume (Set, mL): 480 mL, PEEP (cm H2O): 5 cmH2O, Pressure Support (cm H2O): 10 cmH2O, Resp Rate (Set): 16 breaths/min, Tidal Volume (Set, mL): 480 mL, PEEP (cm H2O): 5 cmH2O       GI:  No active issues   - RD to manage TF     Renal:  ESRD on HD   - Nephrology consult, appreciate recs   - HD stopped mid-session 1/9   - Anticipate attempting HD again today   - Monitor UOP, Cr, I/O   Last Renal Panel:  Sodium   Date Value Ref Range Status   01/11/2025 137 135 - 145 mmol/L Final   04/17/2021 137 133 - 144 mmol/L Final     Potassium   Date Value Ref Range Status   01/11/2025 3.7 3.4 - 5.3 mmol/L Final   02/02/2022 4.7 3.4 - 5.3 mmol/L Final   04/17/2021 4.2 3.4 - 5.3 mmol/L Final     Chloride   Date Value Ref Range Status   01/11/2025 100 98 - 107 mmol/L Final    11/24/2021 106 94 - 109 mmol/L Final   04/17/2021 105 94 - 109 mmol/L Final     Carbon Dioxide   Date Value Ref Range Status   04/17/2021 23 20 - 32 mmol/L Final     Carbon Dioxide (CO2)   Date Value Ref Range Status   01/11/2025 28 22 - 29 mmol/L Final   11/24/2021 31 20 - 32 mmol/L Final     Anion Gap   Date Value Ref Range Status   01/11/2025 9 7 - 15 mmol/L Final   11/24/2021 6 3 - 14 mmol/L Final   04/17/2021 9 3 - 14 mmol/L Final     Glucose   Date Value Ref Range Status   11/24/2021 78 70 - 99 mg/dL Final   04/17/2021 142 (H) 70 - 99 mg/dL Final     GLUCOSE BY METER POCT   Date Value Ref Range Status   01/11/2025 173 (H) 70 - 99 mg/dL Final     Urea Nitrogen   Date Value Ref Range Status   01/11/2025 22.2 8.0 - 23.0 mg/dL Final   11/24/2021 37 (H) 7 - 30 mg/dL Final   04/17/2021 68 (H) 7 - 30 mg/dL Final     Creatinine   Date Value Ref Range Status   01/11/2025 3.48 (H) 0.51 - 0.95 mg/dL Final   04/17/2021 5.98 (H) 0.52 - 1.04 mg/dL Final     GFR Estimate   Date Value Ref Range Status   01/11/2025 13 (L) >60 mL/min/1.73m2 Final     Comment:     eGFR calculated using 2021 CKD-EPI equation.   04/17/2021 6 (L) >60 mL/min/[1.73_m2] Final     Comment:     Non  GFR Calc  Starting 12/18/2018, serum creatinine based estimated GFR (eGFR) will be   calculated using the Chronic Kidney Disease Epidemiology Collaboration   (CKD-EPI) equation.       Calcium   Date Value Ref Range Status   01/11/2025 8.8 8.8 - 10.4 mg/dL Final     Comment:     Reference intervals for this test were updated on 7/16/2024 to reflect our healthy population more accurately. There may be differences in the flagging of prior results with similar values performed with this method. Those prior results can be interpreted in the context of the updated reference intervals.   04/17/2021 9.1 8.5 - 10.1 mg/dL Final     Phosphorus   Date Value Ref Range Status   01/10/2025 4.8 (H) 2.5 - 4.5 mg/dL Final   04/17/2021 5.8 (H) 2.5 - 4.5 mg/dL  "Final     Albumin   Date Value Ref Range Status   01/10/2025 2.8 (L) 3.5 - 5.2 g/dL Final   11/22/2021 3.1 (L) 3.4 - 5.0 g/dL Final   06/08/2020 2.7 (L) 3.4 - 5.0 g/dL Final       ID:  Influenza A pneumonia  ? Community acquired pneumonia   - Complete infectious work-up including sputum culture and UA pending   - Oseltamivir started on admission, continue   - Continue cefepime/vanc for possible community acquired pneumonia until cultures result   - Urine legionella antigen     Heme:  Chronic anemia   - Hgb baseline 9-10, currently 7.5   - Transfuse for Hgb < 7     Endo:  Hx of DMII   - Hold PTA glargine for now, will restart if blood glucose poorly controlled   - SSI     Integumentary/MSK  Right foot ulcer  Coccyx pressure ulcer  Hx of left AKA   - Follows with podiatry   - WOC consult    Clinically Significant Risk Factors               # Hypoalbuminemia: Lowest albumin = 2.8 g/dL at 1/10/2025  5:02 AM, will monitor as appropriate   # Thrombocytopenia: Lowest platelets = 125 in last 2 days, will monitor for bleeding   # Hypertension: Noted on problem list            # Obesity: Estimated body mass index is 33.32 kg/m  as calculated from the following:    Height as of this encounter: 1.702 m (5' 7\").    Weight as of this encounter: 96.5 kg (212 lb 11.9 oz)., PRESENT ON ADMISSION     # Financial/Environmental Concerns: none                PPX  1. DVT: Heparin TID   2. VAP: HOB 30 degrees, chlorhexidine rinse  3. Stress Ulcer: PPI  4. Restraints: Nonviolent soft two point restraints required and necessary for patient safety and continued cares and good effect as patient continues to pull at necessary lines, tubes despite education and distraction. Will readdress daily.   5. Wound care - per unit routine   6. Feeding - TF  7. Family updated via phone.    Interval Hx:  No acute events overnight. Remains intubated.    Unable to obtain ROS 2/2 sedation/intubation.     BP (!) 181/53   Pulse 68   Temp 99.1  F (37.3  C) " "(Axillary)   Resp 26   Ht 1.702 m (5' 7\")   Wt 96.5 kg (212 lb 11.9 oz)   LMP  (LMP Unknown)   SpO2 96%   BMI 33.32 kg/m      Exam:  Constitutional: Sedated, NAD  Head: Normocephalic.  Cardiovascular: Regular rate and rhythm on monitor  Respiratory: Intubated, equal chest rise  Gastrointestinal: Soft, non-distended  Musculoskeletal: Left AKA, otherwise extremities normal- no gross deformities noted  Skin: Right foot ulcer without surrounding erythema  Neurologic: Sedated, GCS 3T    FiO2 (%): 30 %, Resp: 26, Vent Mode: CMV/AC, Resp Rate (Set): 16 breaths/min, Tidal Volume (Set, mL): 480 mL, PEEP (cm H2O): 5 cmH2O, Pressure Support (cm H2O): 10 cmH2O, Resp Rate (Set): 16 breaths/min, Tidal Volume (Set, mL): 480 mL, PEEP (cm H2O): 5 cmH2O    Intake/Output Summary (Last 24 hours) at 1/11/2025 1730  Last data filed at 1/11/2025 1600  Gross per 24 hour   Intake 2130.7 ml   Output 2000 ml   Net 130.7 ml         Labs: reviewed      Vitals:   BP Readings from Last 1 Encounters:   01/11/25 (!) 181/53     Pulse Readings from Last 1 Encounters:   01/11/25 68     Wt Readings from Last 1 Encounters:   01/11/25 96.5 kg (212 lb 11.9 oz)     Ht Readings from Last 1 Encounters:   01/08/25 1.702 m (5' 7\")     Estimated body mass index is 33.32 kg/m  as calculated from the following:    Height as of this encounter: 1.702 m (5' 7\").    Weight as of this encounter: 96.5 kg (212 lb 11.9 oz).  Temp Readings from Last 1 Encounters:   01/11/25 99.1  F (37.3  C) (Axillary)       No results found for this or any previous visit (from the past 24 hours).        Billing: Patient is critically ill. Total critical care time today, excluding procedures, was 45 minutes.    John Renner MD  [facility] Intensivist Service      "

## 2025-01-11 NOTE — PROGRESS NOTES
[facility] ICU RESPIRATORY NOTE        Date of Admission: 1/8/2025    Date of Intubation (most recent): 1/9/25    Reason for Mechanical Ventilation: AW protection    Number of Days on Mechanical Ventilation: 3      Met Criteria for Spontaneous Breathing Trial: No    Reason for No Spontaneous Breathing Trial: Per MD    Bite Block: No    Significant Events Today: None    ABG Results:   Recent Labs   Lab 01/09/25  1540 01/08/25  1151   O2PER 30 5         Current Vent Settings: FiO2 (%): 30 %, Resp: 21, Vent Mode: CMV/AC, Resp Rate (Set): 16 breaths/min, Tidal Volume (Set, mL): 480 mL, PEEP (cm H2O): 5 cmH2O, Resp Rate (Set): 16 breaths/min, Tidal Volume (Set, mL): 480 mL, PEEP (cm H2O): 5 cmH2O    Skin Assessment: Intact    Plan: We will continue to monitor on full support     RT Jak on 1/11/2025 at 4:29 AM

## 2025-01-11 NOTE — PROGRESS NOTES
Potassium   Date Value Ref Range Status   01/10/2025 3.6 3.4 - 5.3 mmol/L Final   02/02/2022 4.7 3.4 - 5.3 mmol/L Final   04/17/2021 4.2 3.4 - 5.3 mmol/L Final     Hemoglobin   Date Value Ref Range Status   01/10/2025 7.5 (L) 11.7 - 15.7 g/dL Final   04/16/2021 10.9 (L) 11.7 - 15.7 g/dL Final     Creatinine   Date Value Ref Range Status   01/10/2025 6.50 (H) 0.51 - 0.95 mg/dL Final   04/17/2021 5.98 (H) 0.52 - 1.04 mg/dL Final     Urea Nitrogen   Date Value Ref Range Status   01/10/2025 46.4 (H) 8.0 - 23.0 mg/dL Final   11/24/2021 37 (H) 7 - 30 mg/dL Final   04/17/2021 68 (H) 7 - 30 mg/dL Final     Sodium   Date Value Ref Range Status   01/10/2025 136 135 - 145 mmol/L Final   04/17/2021 137 133 - 144 mmol/L Final     INR   Date Value Ref Range Status   04/24/2024 1.11 0.85 - 1.15 Final   04/16/2021 1.14 0.86 - 1.14 Final       DIALYSIS PROCEDURE NOTE  Hepatitis status of previous patient on machine log was checked and verified ok to use with this patients hepatitis status.  Patient dialyzed for 3.5 hrs. on a K3 bath with a net fluid removal of  2L.  A BFR of 400 ml/min was obtained via a Left AV Fistula using 15 gauge needles.      The treatment plan was discussed with Dr. Prieto during the treatment.    Total heparin received during the treatment: 2000 units.   Needle cannulation sites held x 6 min.       Meds  given: Retacrit 10,000   Complications: none      Person educated: Patients daughter. Knowledge base minimal. Barriers to learning: none. Educated on procedure via verbal mode. The patient's family verbalized understanding.   ICEBOAT? Timeout performed pre-treatment  I: Patient was identified using 2 identifiers  C:  Consent Signed Yes  E: Equipment preventative maintenance is current and dialysis delivery system OK to use  B: Hepatitis B Surface Antigen: negative; Draw Date: 01/08/2025      Hepatitis B Surface Antibody: Susceptible; Draw Date: 01/09/2025  O: Dialysis orders present and complete prior to  treatment  A: Vascular access verified and assessed prior to treatment  T: Treatment was performed at a clinically appropriate time  ?: Patient was allowed to ask questions and address concerns prior to treatment  See Adult Hemodialysis flowsheet in EPIC for further details and post assessment.  Machine water alarm in place and functioning. Transducer pods intact and checked every 15min.   Pt assisted with repositioning throughout dialysis treatment.    Chlorine/Chloramine water system checked every 4 hours.  Outpatient Dialysis at [facility] on T,Th,Sat      Post treatment report given to SHANTA Clark regarding 2L of fluid removed, last BP    Please remove patient dressing on AVF and AVG needle sites 24 hours after dialysis. If leaking occurs please apply a Band-Aid.

## 2025-01-11 NOTE — PROGRESS NOTES
[facility] ICU RESPIRATORY NOTE        Date of Admission: 1/8/2025    Date of Intubation (most recent): 1/9/25    Reason for Mechanical Ventilation: AW protection    Number of Days on Mechanical Ventilation: 1    Met Criteria for Spontaneous Breathing Trial: Yes    Reason for No Spontaneous Breathing Trial: Attempted, pt with low tidal volumes placed back on full support    Bite Block: No    Significant Events Today: Pt received dialysis    ABG Results:   Recent Labs   Lab 01/09/25  1540 01/08/25  1151   O2PER 30 5         Current Vent Settings: FiO2 (%): 30 %, Resp: 20, Vent Mode: CMV/AC, Resp Rate (Set): 16 breaths/min, Tidal Volume (Set, mL): 480 mL, PEEP (cm H2O): 5 cmH2O, Resp Rate (Set): 16 breaths/min, Tidal Volume (Set, mL): 480 mL, PEEP (cm H2O): 5 cmH2O    Skin Assessment: Intact      Kamryn Newman, RT on 1/10/2025 at 6:29 PM

## 2025-01-11 NOTE — PLAN OF CARE
Goal Outcome Evaluation:         Patient remains on ventilator. Following simple commands. Slightly weaned sedation as tolerated. HD completed with 2 L removed. BP remains stable. Remains anuric. No BM overnight. TF at goal. Plan to continue weaning sedation and possible extubation this AM.

## 2025-01-11 NOTE — PLAN OF CARE
Goal Outcome Evaluation:      Plan of Care Reviewed With: child    Overall Patient Progress: improvingOverall Patient Progress: improving    Outcome Evaluation: Pt will be able to discharge home with family pending d/c recs and when medically able.

## 2025-01-11 NOTE — CONSULTS
Care Management Initial Consult    General Information  Assessment completed with: Caroline aGrcia  Type of CM/SW Visit: Initial Assessment    Primary Care Provider verified and updated as needed: Yes   Readmission within the last 30 days: no previous admission in last 30 days      Reason for Consult: discharge planning  Advance Care Planning: Advance Care Planning Reviewed: present on chart          Communication Assessment  Patient's communication style: spoken language (English or Bilingual)    Hearing Difficulty or Deaf: no   Wear Glasses or Blind: yes    Cognitive  Cognitive/Neuro/Behavioral: .WDL except  Level of Consciousness: sedated  Arousal Level: arouses to voice  Orientation: other (see comments) (CALLIE sedated/intubated)  Mood/Behavior: cooperative, calm  Best Language:  (CALLIE sedated/intubated)  Speech: endotracheal tube    Living Environment:   People in home: grandchild(dominick), child(dominick), adult  Caroline Gray  Current living Arrangements: house      Able to return to prior arrangements: yes       Family/Social Support:  Care provided by: child(dominick)  Provides care for: no one, unable/limited ability to care for self  Marital Status:   Support system: Children, Other (specify)          Description of Support System: Supportive, Involved    Support Assessment: Adequate family and caregiver support    Current Resources:   Patient receiving home care services: No        Community Resources: Dialysis Services, DME  Equipment currently used at home: shower chair, wheelchair, manual  Supplies currently used at home: Oxygen Tubing/Supplies, Diabetic Supplies, Compression Stockings    Employment/Financial:  Employment Status: retired        Financial Concerns: none   Referral to Financial Worker: No       Does the patient's insurance plan have a 3 day qualifying hospital stay waiver?  No    Lifestyle & Psychosocial Needs:  Social Drivers of Health     Food Insecurity: Low Risk  (1/8/2025)    Food Insecurity      Within the past 12 months, did you worry that your food would run out before you got money to buy more?: No     Within the past 12 months, did the food you bought just not last and you didn t have money to get more?: No   Depression: Not at risk (9/11/2024)    PHQ-2     PHQ-2 Score: 0   Housing Stability: Low Risk  (1/8/2025)    Housing Stability     Do you have housing? : Yes     Are you worried about losing your housing?: No   Tobacco Use: Medium Risk (9/11/2024)    Patient History     Smoking Tobacco Use: Former     Smokeless Tobacco Use: Never     Passive Exposure: Not on file   Financial Resource Strain: Low Risk  (1/8/2025)    Financial Resource Strain     Within the past 12 months, have you or your family members you live with been unable to get utilities (heat, electricity) when it was really needed?: No   Alcohol Use: Not on file   Transportation Needs: Low Risk  (1/8/2025)    Transportation Needs     Within the past 12 months, has lack of transportation kept you from medical appointments, getting your medicines, non-medical meetings or appointments, work, or from getting things that you need?: No   Physical Activity: Insufficiently Active (7/19/2024)    Exercise Vital Sign     Days of Exercise per Week: 1 day     Minutes of Exercise per Session: 10 min   Interpersonal Safety: Low Risk  (1/8/2025)    Interpersonal Safety     Do you feel physically and emotionally safe where you currently live?: Yes     Within the past 12 months, have you been hit, slapped, kicked or otherwise physically hurt by someone?: No     Within the past 12 months, have you been humiliated or emotionally abused in other ways by your partner or ex-partner?: No   Stress: No Stress Concern Present (7/19/2024)    [employer] - Occupational Stress Questionnaire     Feeling of Stress : Only a little   Social Connections: Unknown (7/19/2024)    Social Connection and Isolation Panel [NHANES]     Frequency of  "Communication with Friends and Family: Not on file     Frequency of Social Gatherings with Friends and Family: More than three times a week     Attends [employer] Services: Not on file     Active Member of Clubs or Organizations: Not on file     Attends Club or Organization Meetings: Not on file     Marital Status: Not on file   Health Literacy: Not on file       Functional Status:  Prior to admission patient needed assistance:   Dependent ADLs:: Bathing, Dressing, Grooming, Eating, Wheelchair-independent, Transfers, Toileting  Dependent IADLs:: Cleaning, Laundry, Shopping, Meal Preparation, Medication Management, Money Management, Transportation  Assesssment of Functional Status: Not at  functional baseline    Mental Health Status:          Chemical Dependency Status:                Values/Beliefs:  Spiritual, Cultural Beliefs, [employer] Practices, Values that affect care: no               Discussed  Partnership in Safe Discharge Planning  document with patient/family: No    Additional Information:  Per care management consult, chart was reviewed. H&P states pt is a \" 74 year old female with PMHx significant for ESRD on HD, CHF, COPD, DM type II, hypertension.  She presented to the ER via ambulance complaining of shortness of breath. \"    Due to pt being on a ventilator, HIGINIO called pt's daughter, Caroline Gray and introduced self and role. Caroline Gray confirmed pt lives at home with her and Caroline Gray's 11 year old daughter. Pt is an amputee and uses a wheelchair. All needs met on one level, and they have a ramp for pt to get into the home. Caroline Gray provides total cares for pt. Pt can sometimes feed herself depending on her hand strength. Pt goes to dialysis Tuesday, Thursday, and Saturday's, and Caroline Gray drives pt.     Caroline Gray is concerned about transportation at discharge. Caroline Gray states usually pt can get into the car okay but she has a hard time when she is weaker after being in the hospital. HIGINIO told Caroline Gray about [place] " transport and medical cabs if needed for dialysis after her hospital stay.    Next Steps: Follow for discharge recommendations.     TOMER Mendes  [facility]  Inpatient Care Management

## 2025-01-11 NOTE — PLAN OF CARE
Goal Outcome Evaluation:      Plan of Care Reviewed With: child, sibling    Overall Patient Progress: no changeOverall Patient Progress: no change    Outcome Evaluation: No major changes today. Pressure supported 10/5 for approx. an hour but became tachypneic with low tidal volumes. Switched back to full support. Follows commands, GELLER, PEERL. SR. SBP slightly high. TF at goal. No BM. Anuric. Up to chair with lift today. Daughter and sister updated at bedside this evening.      Problem: Adult Inpatient Plan of Care  Goal: Absence of Hospital-Acquired Illness or Injury  Outcome: Not Progressing  Intervention: Identify and Manage Fall Risk  Recent Flowsheet Documentation  Taken 1/11/2025 1600 by Miguelina Cowan RN  Safety Promotion/Fall Prevention:   activity supervised   bedside attendant   increase visualization of patient   lighting adjusted  Taken 1/11/2025 1200 by Miguelina Cowan RN  Safety Promotion/Fall Prevention:   activity supervised   bedside attendant   increase visualization of patient   lighting adjusted  Taken 1/11/2025 0800 by Miguelina Cowan RN  Safety Promotion/Fall Prevention:   activity supervised   bedside attendant   increase visualization of patient   lighting adjusted  Intervention: Prevent Skin Injury  Recent Flowsheet Documentation  Taken 1/11/2025 1800 by Migueilna Cowan RN  Body Position:   turned   left  Taken 1/11/2025 1600 by Miguelina Cowan RN  Body Position:   turned   right  Taken 1/11/2025 1400 by Miguelina Cowan RN  Body Position:   turned   left  Taken 1/11/2025 1200 by Miguelina Cowan RN  Body Position:   turned   right  Taken 1/11/2025 0800 by Miguelina Cowan RN  Body Position:   turned   right  Intervention: Prevent Infection  Recent Flowsheet Documentation  Taken 1/11/2025 1600 by Miguelina Cowan RN  Infection Prevention: rest/sleep promoted  Taken 1/11/2025 1200 by Miguelina Cowan RN  Infection Prevention: rest/sleep promoted  Taken 1/11/2025 0800 by Miguelina Cowan RN  Infection  Prevention: rest/sleep promoted     Problem: Adult Inpatient Plan of Care  Goal: Absence of Hospital-Acquired Illness or Injury  Intervention: Prevent Infection  Recent Flowsheet Documentation  Taken 1/11/2025 1600 by Miguelina Cowan RN  Infection Prevention: rest/sleep promoted  Taken 1/11/2025 1200 by Miguelina Cowan RN  Infection Prevention: rest/sleep promoted  Taken 1/11/2025 0800 by Miguelina Cowan RN  Infection Prevention: rest/sleep promoted     Problem: Gas Exchange Impaired  Goal: Optimal Gas Exchange  Outcome: Not Progressing  Intervention: Optimize Oxygenation and Ventilation  Recent Flowsheet Documentation  Taken 1/11/2025 1800 by Miguelina Cowan RN  Head of Bed (HOB) Positioning: HOB at 30 degrees  Taken 1/11/2025 1600 by Miguelina Cowan RN  Head of Bed (HOB) Positioning: HOB at 30 degrees  Taken 1/11/2025 1400 by Miguelina Cowan RN  Head of Bed (HOB) Positioning: HOB at 30 degrees  Taken 1/11/2025 1200 by Miguelina Cowan RN  Head of Bed (HOB) Positioning: HOB at 30 degrees  Taken 1/11/2025 0800 by Miguelina Cowan RN  Head of Bed (HOB) Positioning: HOB at 30 degrees

## 2025-01-12 LAB
ANION GAP SERPL CALCULATED.3IONS-SCNC: 12 MMOL/L (ref 7–15)
BUN SERPL-MCNC: 44.5 MG/DL (ref 8–23)
CALCIUM SERPL-MCNC: 9.2 MG/DL (ref 8.8–10.4)
CHLORIDE SERPL-SCNC: 96 MMOL/L (ref 98–107)
CREAT SERPL-MCNC: 4.78 MG/DL (ref 0.51–0.95)
EGFRCR SERPLBLD CKD-EPI 2021: 9 ML/MIN/1.73M2
ERYTHROCYTE [DISTWIDTH] IN BLOOD BY AUTOMATED COUNT: 14.4 % (ref 10–15)
GLUCOSE BLDC GLUCOMTR-MCNC: 126 MG/DL (ref 70–99)
GLUCOSE BLDC GLUCOMTR-MCNC: 136 MG/DL (ref 70–99)
GLUCOSE BLDC GLUCOMTR-MCNC: 152 MG/DL (ref 70–99)
GLUCOSE BLDC GLUCOMTR-MCNC: 154 MG/DL (ref 70–99)
GLUCOSE BLDC GLUCOMTR-MCNC: 156 MG/DL (ref 70–99)
GLUCOSE BLDC GLUCOMTR-MCNC: 157 MG/DL (ref 70–99)
GLUCOSE SERPL-MCNC: 183 MG/DL (ref 70–99)
HCO3 SERPL-SCNC: 28 MMOL/L (ref 22–29)
HCT VFR BLD AUTO: 24.5 % (ref 35–47)
HGB BLD-MCNC: 8.1 G/DL (ref 11.7–15.7)
MCH RBC QN AUTO: 31.3 PG (ref 26.5–33)
MCHC RBC AUTO-ENTMCNC: 33.1 G/DL (ref 31.5–36.5)
MCV RBC AUTO: 95 FL (ref 78–100)
PLATELET # BLD AUTO: 135 10E3/UL (ref 150–450)
POTASSIUM SERPL-SCNC: 4.3 MMOL/L (ref 3.4–5.3)
RBC # BLD AUTO: 2.59 10E6/UL (ref 3.8–5.2)
SODIUM SERPL-SCNC: 136 MMOL/L (ref 135–145)
WBC # BLD AUTO: 3.7 10E3/UL (ref 4–11)

## 2025-01-12 PROCEDURE — 250N000011 HC RX IP 250 OP 636: Performed by: STUDENT IN AN ORGANIZED HEALTH CARE EDUCATION/TRAINING PROGRAM

## 2025-01-12 PROCEDURE — 36415 COLL VENOUS BLD VENIPUNCTURE: CPT | Performed by: STUDENT IN AN ORGANIZED HEALTH CARE EDUCATION/TRAINING PROGRAM

## 2025-01-12 PROCEDURE — 94003 VENT MGMT INPAT SUBQ DAY: CPT

## 2025-01-12 PROCEDURE — 250N000009 HC RX 250: Performed by: STUDENT IN AN ORGANIZED HEALTH CARE EDUCATION/TRAINING PROGRAM

## 2025-01-12 PROCEDURE — 99291 CRITICAL CARE FIRST HOUR: CPT | Performed by: INTERNAL MEDICINE

## 2025-01-12 PROCEDURE — 94640 AIRWAY INHALATION TREATMENT: CPT

## 2025-01-12 PROCEDURE — 80048 BASIC METABOLIC PNL TOTAL CA: CPT | Performed by: STUDENT IN AN ORGANIZED HEALTH CARE EDUCATION/TRAINING PROGRAM

## 2025-01-12 PROCEDURE — 250N000011 HC RX IP 250 OP 636: Performed by: NURSE PRACTITIONER

## 2025-01-12 PROCEDURE — 85014 HEMATOCRIT: CPT | Performed by: STUDENT IN AN ORGANIZED HEALTH CARE EDUCATION/TRAINING PROGRAM

## 2025-01-12 PROCEDURE — 250N000013 HC RX MED GY IP 250 OP 250 PS 637: Performed by: INTERNAL MEDICINE

## 2025-01-12 PROCEDURE — 250N000013 HC RX MED GY IP 250 OP 250 PS 637: Performed by: STUDENT IN AN ORGANIZED HEALTH CARE EDUCATION/TRAINING PROGRAM

## 2025-01-12 PROCEDURE — 999N000157 HC STATISTIC RCP TIME EA 10 MIN

## 2025-01-12 PROCEDURE — 200N000001 HC R&B ICU

## 2025-01-12 PROCEDURE — 94640 AIRWAY INHALATION TREATMENT: CPT | Mod: 76

## 2025-01-12 RX ORDER — OSELTAMIVIR PHOSPHATE 6 MG/ML
30 FOR SUSPENSION ORAL
Status: DISCONTINUED | OUTPATIENT
Start: 2025-01-13 | End: 2025-01-12

## 2025-01-12 RX ORDER — OSELTAMIVIR PHOSPHATE 6 MG/ML
30 FOR SUSPENSION ORAL
Status: DISPENSED | OUTPATIENT
Start: 2025-01-13 | End: 2025-01-17

## 2025-01-12 RX ADMIN — CHLORHEXIDINE GLUCONATE 15 ML: 1.2 RINSE ORAL at 08:27

## 2025-01-12 RX ADMIN — CHLORHEXIDINE GLUCONATE 15 ML: 1.2 RINSE ORAL at 21:04

## 2025-01-12 RX ADMIN — CETIRIZINE HYDROCHLORIDE 5 MG: 5 TABLET ORAL at 21:18

## 2025-01-12 RX ADMIN — IPRATROPIUM BROMIDE AND ALBUTEROL SULFATE 3 ML: .5; 3 SOLUTION RESPIRATORY (INHALATION) at 10:46

## 2025-01-12 RX ADMIN — HEPARIN SODIUM 5000 UNITS: 5000 INJECTION, SOLUTION INTRAVENOUS; SUBCUTANEOUS at 05:18

## 2025-01-12 RX ADMIN — PROPOFOL 20 MCG/KG/MIN: 10 INJECTION, EMULSION INTRAVENOUS at 10:37

## 2025-01-12 RX ADMIN — Medication 40 MG: at 08:27

## 2025-01-12 RX ADMIN — ACETAMINOPHEN 650 MG: 325 TABLET, FILM COATED ORAL at 10:22

## 2025-01-12 RX ADMIN — TACROLIMUS: 1 OINTMENT TOPICAL at 10:22

## 2025-01-12 RX ADMIN — GABAPENTIN 100 MG: 250 SOLUTION ORAL at 08:27

## 2025-01-12 RX ADMIN — INSULIN ASPART 1 UNITS: 100 INJECTION, SOLUTION INTRAVENOUS; SUBCUTANEOUS at 08:45

## 2025-01-12 RX ADMIN — PROPOFOL 15 MCG/KG/MIN: 10 INJECTION, EMULSION INTRAVENOUS at 18:03

## 2025-01-12 RX ADMIN — ACETAMINOPHEN 650 MG: 325 TABLET, FILM COATED ORAL at 16:18

## 2025-01-12 RX ADMIN — SEVELAMER CARBONATE 800 MG: 800 TABLET, FILM COATED ORAL at 18:07

## 2025-01-12 RX ADMIN — Medication 50 MCG: at 08:27

## 2025-01-12 RX ADMIN — HEPARIN SODIUM 5000 UNITS: 5000 INJECTION, SOLUTION INTRAVENOUS; SUBCUTANEOUS at 13:59

## 2025-01-12 RX ADMIN — IPRATROPIUM BROMIDE AND ALBUTEROL SULFATE 3 ML: .5; 3 SOLUTION RESPIRATORY (INHALATION) at 20:05

## 2025-01-12 RX ADMIN — Medication 5 ML: at 08:27

## 2025-01-12 RX ADMIN — INSULIN ASPART 1 UNITS: 100 INJECTION, SOLUTION INTRAVENOUS; SUBCUTANEOUS at 04:19

## 2025-01-12 RX ADMIN — TACROLIMUS: 1 OINTMENT TOPICAL at 21:51

## 2025-01-12 RX ADMIN — INSULIN ASPART 1 UNITS: 100 INJECTION, SOLUTION INTRAVENOUS; SUBCUTANEOUS at 11:57

## 2025-01-12 RX ADMIN — INSULIN ASPART 1 UNITS: 100 INJECTION, SOLUTION INTRAVENOUS; SUBCUTANEOUS at 00:37

## 2025-01-12 RX ADMIN — IPRATROPIUM BROMIDE AND ALBUTEROL SULFATE 3 ML: .5; 3 SOLUTION RESPIRATORY (INHALATION) at 07:27

## 2025-01-12 RX ADMIN — SERTRALINE HYDROCHLORIDE 75 MG: 50 TABLET ORAL at 21:04

## 2025-01-12 RX ADMIN — IPRATROPIUM BROMIDE AND ALBUTEROL SULFATE 3 ML: .5; 3 SOLUTION RESPIRATORY (INHALATION) at 14:45

## 2025-01-12 RX ADMIN — HEPARIN SODIUM 5000 UNITS: 5000 INJECTION, SOLUTION INTRAVENOUS; SUBCUTANEOUS at 21:04

## 2025-01-12 RX ADMIN — PROPOFOL 25 MCG/KG/MIN: 10 INJECTION, EMULSION INTRAVENOUS at 02:13

## 2025-01-12 RX ADMIN — SEVELAMER CARBONATE 800 MG: 800 TABLET, FILM COATED ORAL at 11:57

## 2025-01-12 RX ADMIN — ATORVASTATIN CALCIUM 20 MG: 10 TABLET, FILM COATED ORAL at 21:05

## 2025-01-12 RX ADMIN — CEFEPIME 1 G: 1 INJECTION, POWDER, FOR SOLUTION INTRAMUSCULAR; INTRAVENOUS at 11:57

## 2025-01-12 RX ADMIN — SEVELAMER CARBONATE 800 MG: 800 TABLET, FILM COATED ORAL at 08:27

## 2025-01-12 RX ADMIN — PROPOFOL 40 MCG/KG/MIN: 10 INJECTION, EMULSION INTRAVENOUS at 23:33

## 2025-01-12 ASSESSMENT — ACTIVITIES OF DAILY LIVING (ADL)
ADLS_ACUITY_SCORE: 71
ADLS_ACUITY_SCORE: 75

## 2025-01-12 NOTE — PROGRESS NOTES
[facility] ICU RESPIRATORY NOTE        Date of Admission: 1/8/2025    Date of Intubation (most recent):  1/9/24    Reason for Mechanical Ventilation: AW protection    Number of Days on Mechanical Ventilation: 4    Met Criteria for Spontaneous Breathing Trial: Yes  PS 7/5    Significant Events Today: None    ABG Results:   Recent Labs   Lab 01/09/25  1540 01/08/25  1151   O2PER 30 5         Current Vent Settings: FiO2 (%): 30 %, Resp: 16, Vent Mode: CMV/AC, Resp Rate (Set): 16 breaths/min, Tidal Volume (Set, mL): 480 mL, PEEP (cm H2O): 5 cmH2O, Pressure Support (cm H2O): 7 cmH2O, Resp Rate (Set): 16 breaths/min, Tidal Volume (Set, mL): 480 mL, PEEP (cm H2O): 5 cmH2O    Skin Assessment: Intact    Plan: Pt  to remain full vent support overnight     Jakub Domínguez, RT

## 2025-01-12 NOTE — PLAN OF CARE
Goal Outcome Evaluation:         Patient on full vent support. Follows simple commands. PRN tylenol x1 for high temp. BP WNL. Tele SR/yaya. Remains anuric. No BM overnight. Writer updated daughter over phone call. Plan for PS this AM and HD on Monday.

## 2025-01-12 NOTE — PLAN OF CARE
Remains intubated, sedated on propofol, VSS. Failed SBT trials. Tf at goal. Plans for HD tomorrow. Daughter updated at bedside  Goal Outcome Evaluation: Progressing

## 2025-01-12 NOTE — PROGRESS NOTES
ICU Progress Note    Date of Service: 01/12/25    A/P:  Supriya Herr is a 74 year old female with a history of  ESRD on HD, CHF, COPD, DM type II, hypertension who was admitted 1/8 with influenza A. This morning during dialysis, an RRT was called for hypoxia and she was intubated. The patient was admitted to the ICU for medical management.     I have personally reviewed the daily labs, imaging studies, cultures and discussed the case with referring physician and consulting physicians.     Neuro:  Post-intubation sedation  Likely baseline cognitive impairment  - Propofol for sedation, wean as able today  - PTA Zoloft, gabapentin     CV:  Shock, septic vs polypharmacy  Hx of CHF  Hx of HTN, HLD  - MAP goal > 65, not requiring pressors  - Hold PTA amlodipine, carvedilol, furosemide     Pulm:  Acute hypoxemic respiratory failure  Influenza A  Hx of COPD, JONE  ? PE - low likelihood  ? Community acquired pneumonia  - low tidal volume ventilation  - BLE Doppler US negative 1/9  - Continue cefepime/vanc until after cultures result  -failed spontaneous breathing trial today   FiO2 (%): 30 %, Resp: 19, Vent Mode: CMV/AC, Resp Rate (Set): 16 breaths/min, Tidal Volume (Set, mL): 480 mL, PEEP (cm H2O): 5 cmH2O, Pressure Support (cm H2O): 10 cmH2O, Resp Rate (Set): 16 breaths/min, Tidal Volume (Set, mL): 480 mL, PEEP (cm H2O): 5 cmH2O       GI:  No active issues   - RD to manage TF     Renal:  ESRD on HD   - Nephrology consult, appreciate recs   - HD stopped mid-session 1/9   - Anticipate attempting HD again tomorrow   - Monitor UOP, Cr, I/O   Last Renal Panel:  Sodium   Date Value Ref Range Status   01/12/2025 136 135 - 145 mmol/L Final   04/17/2021 137 133 - 144 mmol/L Final     Potassium   Date Value Ref Range Status   01/12/2025 4.3 3.4 - 5.3 mmol/L Final   02/02/2022 4.7 3.4 - 5.3 mmol/L Final   04/17/2021 4.2 3.4 - 5.3 mmol/L Final     Chloride   Date Value Ref Range Status   01/12/2025 96 (L) 98 - 107 mmol/L Final    11/24/2021 106 94 - 109 mmol/L Final   04/17/2021 105 94 - 109 mmol/L Final     Carbon Dioxide   Date Value Ref Range Status   04/17/2021 23 20 - 32 mmol/L Final     Carbon Dioxide (CO2)   Date Value Ref Range Status   01/12/2025 28 22 - 29 mmol/L Final   11/24/2021 31 20 - 32 mmol/L Final     Anion Gap   Date Value Ref Range Status   01/12/2025 12 7 - 15 mmol/L Final   11/24/2021 6 3 - 14 mmol/L Final   04/17/2021 9 3 - 14 mmol/L Final     Glucose   Date Value Ref Range Status   01/12/2025 183 (H) 70 - 99 mg/dL Final   11/24/2021 78 70 - 99 mg/dL Final   04/17/2021 142 (H) 70 - 99 mg/dL Final     GLUCOSE BY METER POCT   Date Value Ref Range Status   01/12/2025 152 (H) 70 - 99 mg/dL Final     Urea Nitrogen   Date Value Ref Range Status   01/12/2025 44.5 (H) 8.0 - 23.0 mg/dL Final   11/24/2021 37 (H) 7 - 30 mg/dL Final   04/17/2021 68 (H) 7 - 30 mg/dL Final     Creatinine   Date Value Ref Range Status   01/12/2025 4.78 (H) 0.51 - 0.95 mg/dL Final   04/17/2021 5.98 (H) 0.52 - 1.04 mg/dL Final     GFR Estimate   Date Value Ref Range Status   01/12/2025 9 (L) >60 mL/min/1.73m2 Final     Comment:     eGFR calculated using 2021 CKD-EPI equation.   04/17/2021 6 (L) >60 mL/min/[1.73_m2] Final     Comment:     Non  GFR Calc  Starting 12/18/2018, serum creatinine based estimated GFR (eGFR) will be   calculated using the Chronic Kidney Disease Epidemiology Collaboration   (CKD-EPI) equation.       Calcium   Date Value Ref Range Status   01/12/2025 9.2 8.8 - 10.4 mg/dL Final     Comment:     Reference intervals for this test were updated on 7/16/2024 to reflect our healthy population more accurately. There may be differences in the flagging of prior results with similar values performed with this method. Those prior results can be interpreted in the context of the updated reference intervals.   04/17/2021 9.1 8.5 - 10.1 mg/dL Final     Phosphorus   Date Value Ref Range Status   01/10/2025 4.8 (H) 2.5 - 4.5  "mg/dL Final   04/17/2021 5.8 (H) 2.5 - 4.5 mg/dL Final     Albumin   Date Value Ref Range Status   01/10/2025 2.8 (L) 3.5 - 5.2 g/dL Final   11/22/2021 3.1 (L) 3.4 - 5.0 g/dL Final   06/08/2020 2.7 (L) 3.4 - 5.0 g/dL Final       ID:  Influenza A pneumonia  ? Community acquired pneumonia   - Complete infectious work-up including sputum culture and UA pending   - Oseltamivir started on admission, continue   - Continue cefepime/vanc for possible community acquired pneumonia until cultures result   - Urine legionella antigen     Heme:  Chronic anemia   - Hgb baseline 9-10, currently 7.5   - Transfuse for Hgb < 7     Endo:  Hx of DMII   - Hold PTA glargine for now, will restart if blood glucose poorly controlled   - SSI     Integumentary/MSK  Right foot ulcer  Coccyx pressure ulcer  Hx of left AKA   - Follows with podiatry   - WOC consult    Clinically Significant Risk Factors          # Hypochloremia: Lowest Cl = 96 mmol/L in last 2 days, will monitor as appropriate      # Hypoalbuminemia: Lowest albumin = 2.8 g/dL at 1/10/2025  5:02 AM, will monitor as appropriate     # Hypertension: Noted on problem list            # Obesity: Estimated body mass index is 33.6 kg/m  as calculated from the following:    Height as of this encounter: 1.702 m (5' 7\").    Weight as of this encounter: 97.3 kg (214 lb 8.1 oz).      # Financial/Environmental Concerns: none                PPX  1. DVT: Heparin TID   2. VAP: HOB 30 degrees, chlorhexidine rinse  3. Stress Ulcer: PPI  4. Restraints: Nonviolent soft two point restraints required and necessary for patient safety and continued cares and good effect as patient continues to pull at necessary lines, tubes despite education and distraction. Will readdress daily.   5. Wound care - per unit routine   6. Feeding - TF  7. Family updated via phone.    Interval Hx:  No acute events overnight. Remains intubated.    Unable to obtain ROS 2/2 sedation/intubation.     /52 (BP Location: Right " "leg)   Pulse 59   Temp 98.4  F (36.9  C)   Resp 19   Ht 1.702 m (5' 7\")   Wt 97.3 kg (214 lb 8.1 oz)   LMP  (LMP Unknown)   SpO2 97%   BMI 33.60 kg/m      Exam:  Constitutional: Sedated, NAD  Head: Normocephalic.  Cardiovascular: Regular rate and rhythm on monitor  Respiratory: Intubated, equal chest rise  Gastrointestinal: Soft, non-distended  Musculoskeletal: Left AKA, otherwise extremities normal- no gross deformities noted  Skin: Right foot ulcer without surrounding erythema  Neurologic: Sedated, GCS 3T    FiO2 (%): 30 %, Resp: 19, Vent Mode: CMV/AC, Resp Rate (Set): 16 breaths/min, Tidal Volume (Set, mL): 480 mL, PEEP (cm H2O): 5 cmH2O, Pressure Support (cm H2O): 10 cmH2O, Resp Rate (Set): 16 breaths/min, Tidal Volume (Set, mL): 480 mL, PEEP (cm H2O): 5 cmH2O    Intake/Output Summary (Last 24 hours) at 1/12/2025 1342  Last data filed at 1/12/2025 1200  Gross per 24 hour   Intake 2130.92 ml   Output --   Net 2130.92 ml         Labs: reviewed      Vitals:   BP Readings from Last 1 Encounters:   01/12/25 125/52     Pulse Readings from Last 1 Encounters:   01/12/25 59     Wt Readings from Last 1 Encounters:   01/12/25 97.3 kg (214 lb 8.1 oz)     Ht Readings from Last 1 Encounters:   01/08/25 1.702 m (5' 7\")     Estimated body mass index is 33.6 kg/m  as calculated from the following:    Height as of this encounter: 1.702 m (5' 7\").    Weight as of this encounter: 97.3 kg (214 lb 8.1 oz).  Temp Readings from Last 1 Encounters:   01/12/25 98.4  F (36.9  C)       No results found for this or any previous visit (from the past 24 hours).        Billing: Patient is critically ill. Total critical care time today, excluding procedures, was 40 minutes.    John Renner MD  [facility] Intensivist Service      "

## 2025-01-12 NOTE — PROGRESS NOTES
[facility] ICU RESPIRATORY NOTE        Date of Admission: 1/8/2025    Date of Intubation (most recent): 1/9/25    Reason for Mechanical Ventilation: Aw protection    Number of Days on Mechanical Ventilation: 4    Met Criteria for Spontaneous Breathing Trial: No    Reason for No Spontaneous Breathing Trial: Per MD    Bite Block: No    Significant Events Today: None    ABG Results:   Recent Labs   Lab 01/09/25  1540 01/08/25  1151   O2PER 30 5         Current Vent Settings: FiO2 (%): 30 %, Resp: 16, Vent Mode: CMV/AC, Resp Rate (Set): 16 breaths/min, Tidal Volume (Set, mL): 480 mL, PEEP (cm H2O): 5 cmH2O, Pressure Support (cm H2O): 10 cmH2O, Resp Rate (Set): 16 breaths/min, Tidal Volume (Set, mL): 480 mL, PEEP (cm H2O): 5 cmH2O    Skin Assessment: intact    Plan: Continue to monitor and assess for weaning    RT Jak on 1/12/2025 at 4:32 AM

## 2025-01-13 ENCOUNTER — PATIENT OUTREACH (OUTPATIENT)
Dept: GERIATRIC MEDICINE | Facility: CLINIC | Age: 75
End: 2025-01-13

## 2025-01-13 LAB
ALBUMIN SERPL BCG-MCNC: 2.8 G/DL (ref 3.5–5.2)
ANION GAP SERPL CALCULATED.3IONS-SCNC: 14 MMOL/L (ref 7–15)
BACTERIA BLD CULT: NO GROWTH
BACTERIA BLD CULT: NO GROWTH
BUN SERPL-MCNC: 59.2 MG/DL (ref 8–23)
CALCIUM SERPL-MCNC: 9.7 MG/DL (ref 8.8–10.4)
CHLORIDE SERPL-SCNC: 94 MMOL/L (ref 98–107)
CREAT SERPL-MCNC: 5.73 MG/DL (ref 0.51–0.95)
EGFRCR SERPLBLD CKD-EPI 2021: 7 ML/MIN/1.73M2
ERYTHROCYTE [DISTWIDTH] IN BLOOD BY AUTOMATED COUNT: 14.4 % (ref 10–15)
GLUCOSE BLDC GLUCOMTR-MCNC: 129 MG/DL (ref 70–99)
GLUCOSE BLDC GLUCOMTR-MCNC: 139 MG/DL (ref 70–99)
GLUCOSE BLDC GLUCOMTR-MCNC: 146 MG/DL (ref 70–99)
GLUCOSE BLDC GLUCOMTR-MCNC: 158 MG/DL (ref 70–99)
GLUCOSE BLDC GLUCOMTR-MCNC: 168 MG/DL (ref 70–99)
GLUCOSE BLDC GLUCOMTR-MCNC: 170 MG/DL (ref 70–99)
GLUCOSE SERPL-MCNC: 169 MG/DL (ref 70–99)
HCO3 SERPL-SCNC: 27 MMOL/L (ref 22–29)
HCT VFR BLD AUTO: 24.3 % (ref 35–47)
HGB BLD-MCNC: 7.8 G/DL (ref 11.7–15.7)
MAGNESIUM SERPL-MCNC: 2.1 MG/DL (ref 1.7–2.3)
MCH RBC QN AUTO: 30.2 PG (ref 26.5–33)
MCHC RBC AUTO-ENTMCNC: 32.1 G/DL (ref 31.5–36.5)
MCV RBC AUTO: 94 FL (ref 78–100)
PHOSPHATE SERPL-MCNC: 3.9 MG/DL (ref 2.5–4.5)
PLATELET # BLD AUTO: 165 10E3/UL (ref 150–450)
POTASSIUM SERPL-SCNC: 4.7 MMOL/L (ref 3.4–5.3)
RBC # BLD AUTO: 2.58 10E6/UL (ref 3.8–5.2)
SODIUM SERPL-SCNC: 135 MMOL/L (ref 135–145)
WBC # BLD AUTO: 4.6 10E3/UL (ref 4–11)

## 2025-01-13 PROCEDURE — 999N000157 HC STATISTIC RCP TIME EA 10 MIN

## 2025-01-13 PROCEDURE — 85027 COMPLETE CBC AUTOMATED: CPT | Performed by: STUDENT IN AN ORGANIZED HEALTH CARE EDUCATION/TRAINING PROGRAM

## 2025-01-13 PROCEDURE — 250N000013 HC RX MED GY IP 250 OP 250 PS 637: Performed by: INTERNAL MEDICINE

## 2025-01-13 PROCEDURE — 36415 COLL VENOUS BLD VENIPUNCTURE: CPT | Performed by: STUDENT IN AN ORGANIZED HEALTH CARE EDUCATION/TRAINING PROGRAM

## 2025-01-13 PROCEDURE — 250N000013 HC RX MED GY IP 250 OP 250 PS 637: Performed by: STUDENT IN AN ORGANIZED HEALTH CARE EDUCATION/TRAINING PROGRAM

## 2025-01-13 PROCEDURE — 83735 ASSAY OF MAGNESIUM: CPT | Performed by: STUDENT IN AN ORGANIZED HEALTH CARE EDUCATION/TRAINING PROGRAM

## 2025-01-13 PROCEDURE — 99291 CRITICAL CARE FIRST HOUR: CPT | Performed by: INTERNAL MEDICINE

## 2025-01-13 PROCEDURE — 634N000001 HC RX 634: Mod: JZ | Performed by: INTERNAL MEDICINE

## 2025-01-13 PROCEDURE — 90937 HEMODIALYSIS REPEATED EVAL: CPT

## 2025-01-13 PROCEDURE — 94640 AIRWAY INHALATION TREATMENT: CPT | Mod: 76

## 2025-01-13 PROCEDURE — 94003 VENT MGMT INPAT SUBQ DAY: CPT

## 2025-01-13 PROCEDURE — 258N000003 HC RX IP 258 OP 636: Performed by: INTERNAL MEDICINE

## 2025-01-13 PROCEDURE — 82310 ASSAY OF CALCIUM: CPT | Performed by: INTERNAL MEDICINE

## 2025-01-13 PROCEDURE — 250N000011 HC RX IP 250 OP 636: Performed by: NURSE PRACTITIONER

## 2025-01-13 PROCEDURE — 200N000001 HC R&B ICU

## 2025-01-13 PROCEDURE — 250N000009 HC RX 250: Performed by: STUDENT IN AN ORGANIZED HEALTH CARE EDUCATION/TRAINING PROGRAM

## 2025-01-13 PROCEDURE — 250N000011 HC RX IP 250 OP 636: Performed by: STUDENT IN AN ORGANIZED HEALTH CARE EDUCATION/TRAINING PROGRAM

## 2025-01-13 PROCEDURE — 250N000011 HC RX IP 250 OP 636: Performed by: INTERNAL MEDICINE

## 2025-01-13 PROCEDURE — 94640 AIRWAY INHALATION TREATMENT: CPT

## 2025-01-13 PROCEDURE — 99232 SBSQ HOSP IP/OBS MODERATE 35: CPT | Performed by: INTERNAL MEDICINE

## 2025-01-13 RX ORDER — HEPARIN SODIUM 1000 [USP'U]/ML
500 INJECTION, SOLUTION INTRAVENOUS; SUBCUTANEOUS CONTINUOUS
Status: DISCONTINUED | OUTPATIENT
Start: 2025-01-13 | End: 2025-01-14

## 2025-01-13 RX ADMIN — CETIRIZINE HYDROCHLORIDE 5 MG: 5 TABLET ORAL at 21:40

## 2025-01-13 RX ADMIN — IPRATROPIUM BROMIDE AND ALBUTEROL SULFATE 3 ML: .5; 3 SOLUTION RESPIRATORY (INHALATION) at 15:27

## 2025-01-13 RX ADMIN — PROPOFOL 35 MCG/KG/MIN: 10 INJECTION, EMULSION INTRAVENOUS at 11:58

## 2025-01-13 RX ADMIN — SENNOSIDES AND DOCUSATE SODIUM 1 TABLET: 50; 8.6 TABLET ORAL at 14:12

## 2025-01-13 RX ADMIN — HEPARIN SODIUM 5000 UNITS: 5000 INJECTION, SOLUTION INTRAVENOUS; SUBCUTANEOUS at 21:40

## 2025-01-13 RX ADMIN — IPRATROPIUM BROMIDE AND ALBUTEROL SULFATE 3 ML: .5; 3 SOLUTION RESPIRATORY (INHALATION) at 07:35

## 2025-01-13 RX ADMIN — HEPARIN SODIUM 5000 UNITS: 5000 INJECTION, SOLUTION INTRAVENOUS; SUBCUTANEOUS at 06:03

## 2025-01-13 RX ADMIN — HEPARIN SODIUM 500 UNITS: 1000 INJECTION INTRAVENOUS; SUBCUTANEOUS at 16:37

## 2025-01-13 RX ADMIN — SEVELAMER CARBONATE 800 MG: 800 TABLET, FILM COATED ORAL at 11:51

## 2025-01-13 RX ADMIN — Medication 40 MG: at 07:58

## 2025-01-13 RX ADMIN — HEPARIN SODIUM 5000 UNITS: 5000 INJECTION, SOLUTION INTRAVENOUS; SUBCUTANEOUS at 14:12

## 2025-01-13 RX ADMIN — Medication 50 MCG: at 07:58

## 2025-01-13 RX ADMIN — TACROLIMUS: 1 OINTMENT TOPICAL at 07:59

## 2025-01-13 RX ADMIN — GABAPENTIN 100 MG: 250 SOLUTION ORAL at 08:28

## 2025-01-13 RX ADMIN — INSULIN ASPART 1 UNITS: 100 INJECTION, SOLUTION INTRAVENOUS; SUBCUTANEOUS at 16:02

## 2025-01-13 RX ADMIN — CEFEPIME 1 G: 1 INJECTION, POWDER, FOR SOLUTION INTRAMUSCULAR; INTRAVENOUS at 11:49

## 2025-01-13 RX ADMIN — IPRATROPIUM BROMIDE AND ALBUTEROL SULFATE 3 ML: .5; 3 SOLUTION RESPIRATORY (INHALATION) at 11:36

## 2025-01-13 RX ADMIN — EPOETIN ALFA-EPBX 5000 UNITS: 3000 INJECTION, SOLUTION INTRAVENOUS; SUBCUTANEOUS at 17:46

## 2025-01-13 RX ADMIN — PROPOFOL 35 MCG/KG/MIN: 10 INJECTION, EMULSION INTRAVENOUS at 08:09

## 2025-01-13 RX ADMIN — CHLORHEXIDINE GLUCONATE 15 ML: 1.2 RINSE ORAL at 20:30

## 2025-01-13 RX ADMIN — INSULIN ASPART 1 UNITS: 100 INJECTION, SOLUTION INTRAVENOUS; SUBCUTANEOUS at 12:04

## 2025-01-13 RX ADMIN — SEVELAMER CARBONATE 800 MG: 800 TABLET, FILM COATED ORAL at 17:02

## 2025-01-13 RX ADMIN — INSULIN ASPART 1 UNITS: 100 INJECTION, SOLUTION INTRAVENOUS; SUBCUTANEOUS at 08:07

## 2025-01-13 RX ADMIN — CHLORHEXIDINE GLUCONATE 15 ML: 1.2 RINSE ORAL at 07:58

## 2025-01-13 RX ADMIN — TACROLIMUS: 1 OINTMENT TOPICAL at 20:46

## 2025-01-13 RX ADMIN — PROPOFOL 40 MCG/KG/MIN: 10 INJECTION, EMULSION INTRAVENOUS at 03:49

## 2025-01-13 RX ADMIN — IPRATROPIUM BROMIDE AND ALBUTEROL SULFATE 3 ML: .5; 3 SOLUTION RESPIRATORY (INHALATION) at 19:34

## 2025-01-13 RX ADMIN — ATORVASTATIN CALCIUM 20 MG: 10 TABLET, FILM COATED ORAL at 20:30

## 2025-01-13 RX ADMIN — PROPOFOL 35 MCG/KG/MIN: 10 INJECTION, EMULSION INTRAVENOUS at 21:25

## 2025-01-13 RX ADMIN — OSELTAMIVIR PHOSPHATE 30 MG: 6 FOR SUSPENSION ORAL at 20:30

## 2025-01-13 RX ADMIN — SODIUM CHLORIDE 200 ML: 9 INJECTION, SOLUTION INTRAVENOUS at 19:40

## 2025-01-13 RX ADMIN — SERTRALINE HYDROCHLORIDE 75 MG: 50 TABLET ORAL at 20:30

## 2025-01-13 RX ADMIN — PROPOFOL 35 MCG/KG/MIN: 10 INJECTION, EMULSION INTRAVENOUS at 17:00

## 2025-01-13 RX ADMIN — HEPARIN SODIUM 500 UNITS/HR: 1000 INJECTION INTRAVENOUS; SUBCUTANEOUS at 16:37

## 2025-01-13 RX ADMIN — SEVELAMER CARBONATE 800 MG: 800 TABLET, FILM COATED ORAL at 07:58

## 2025-01-13 RX ADMIN — Medication 5 ML: at 08:28

## 2025-01-13 RX ADMIN — SODIUM CHLORIDE 200 ML: 9 INJECTION, SOLUTION INTRAVENOUS at 16:37

## 2025-01-13 ASSESSMENT — ACTIVITIES OF DAILY LIVING (ADL)
ADLS_ACUITY_SCORE: 71
ADLS_ACUITY_SCORE: 71
ADLS_ACUITY_SCORE: 72
ADLS_ACUITY_SCORE: 71
ADLS_ACUITY_SCORE: 71
ADLS_ACUITY_SCORE: 72
ADLS_ACUITY_SCORE: 71
ADLS_ACUITY_SCORE: 72
ADLS_ACUITY_SCORE: 71
ADLS_ACUITY_SCORE: 71
ADLS_ACUITY_SCORE: 75
ADLS_ACUITY_SCORE: 71
ADLS_ACUITY_SCORE: 76
ADLS_ACUITY_SCORE: 75
ADLS_ACUITY_SCORE: 76
ADLS_ACUITY_SCORE: 76
ADLS_ACUITY_SCORE: 71
ADLS_ACUITY_SCORE: 71
ADLS_ACUITY_SCORE: 76
ADLS_ACUITY_SCORE: 72
ADLS_ACUITY_SCORE: 71

## 2025-01-13 NOTE — PROGRESS NOTES
"Antimicrobial Stewardship Team Note    Antimicrobial Stewardship Program - A joint venture between [facility] and [facility] to optimize antibiotic management.     Patient: Supriya Herr  MRN: [id number]  Allergies: Ampicillin-sulbactam sodium and Penicillins    Brief Summary: Supriya Herr is a 74 year old female admitted on 1/8/25 with respiratory failure, found to have influenza A. PMH significant for ESRD on HD, CHF, COPD, T2DM, and HTN. She had RRT called during dialysis on 1/9 for hypoxia, required intubation and transfer to the ICU. She is currently on oseltamivir for influenza A as well as cefepime that was started on 1/9 for possible super-imposed bacterial pneumonia.    WBC count normal to slightly low this hospitalization, intermittently febrile with last true fever 101.7F on 1/11. Procalcitonin negative at 0.39. Remains mechanically ventilated. Blood cultures negative, MRSA nares PCR negative, sputum culture from 1/9 with 1+ normal shree. CXR 1/9 with scattered interstitial changes similar to previous. CXR 1/8 with peripheral interstitial changes noted, question interstitial edema, no consolidation.         Active Anti-infective Medications   (From admission, onward)                 Start     Stop    01/11/25 1400  oseltamivir  30 mg,   Oral,   ONCE        Influenza      Placed in \"And\" Linked Group    --    01/09/25 1400  oseltamivir  30 mg,   Oral,   ONCE        Influenza      Placed in \"And\" Linked Group    --    01/09/25 1100  ceFEPIme  1 g,   Intravenous,   EVERY 24 HOURS        Community Acquired Pneumonia       --    01/09/25 1100  azithromycin  500 mg,   Intravenous,   EVERY 24 HOURS        Community Acquired Pneumonia       --                  Assessment: Influenza A  Patient presented with shortness of breath, found to be influenza A+ and started on oseltamivir. On 1/9, RRT called for hypoxia requiring intubation and transfer to the ICU. She was " started on cefepime with concern for bacterial pneumonia although sign/symptoms not suggestive of this given negative procalcitonin, normal WBC count, and no consolidations/opacities seen on imaging. She has completed adequate 5 day empiric course of cefepime, recommend stopping at this time. Agree with 10 day course of oseltamivir.    Recommendations:  Stop cefepime.  Agree with 10 day course of oseltamivir.    Discussed with ID Staff MD Usha Guerra, PharmD, BCIDP    Vital Signs/Clinical Features:  Vitals         01/11 0700  01/12 0659 01/12 0700  01/13 0659 01/13 0700 01/13 1314   Most Recent      Temp ( F) 97.7 -  100.2    97.8 -  99.6    97 -  98     98 (36.7) 01/13 1140    Pulse 57 -  73    56 -  73    56 -  62     59 01/13 1200    Resp 11 -  26    11 -  26    15 -  22     22 01/13 1200    /46 -  185/72    124/57 -  148/59    117/55 -  135/55     134/48 01/13 1200    SpO2 (%) 94 -  100    94 -  98    95 -  97     96 01/13 1200            Labs  Estimated Creatinine Clearance: 10.2 mL/min (A) (based on SCr of 5.73 mg/dL (H)).  Recent Labs   Lab Test 01/08/25  0742 01/09/25  0734 01/10/25  0502 01/11/25  0457 01/12/25  0621 01/13/25  0514   CR 4.71* 6.61* 6.50* 3.48* 4.78* 5.73*       Recent Labs   Lab Test 07/09/19  1513 08/07/19  1504 05/07/20  0607 05/08/20  0540 05/08/20  0756 06/05/20  1307 06/08/20  1605 11/16/20  0653 04/09/21  1456 04/16/21  0800 08/16/21  1816 01/08/25  0742 01/09/25  0734 01/10/25  0502 01/11/25  0457 01/12/25  0621 01/13/25  0514   WBC 6.3   < > 6.6 6.9  --  5.9   < > 6.1   < > 6.4   < > 4.0 5.8 3.5* 4.2 3.7* 4.6   ANEU 3.8  --  4.5 4.7  --  4.3  --  3.4  --  3.6  --   --   --   --   --   --   --    ALYM 1.6  --  1.2 1.5  --  1.1  --  1.8  --  1.8  --   --   --   --   --   --   --    JOANNE 0.6  --  0.6 0.7  --  0.3  --  0.6  --  0.7  --   --   --   --   --   --   --    AEOS 0.3  --  0.2 0.0  --  0.1  --  0.2  --  0.2  --   --   --   --   --   --   --    HGB 9.3*    < > 9.6* 7.5*   < > 5.1*   < > 10.5*   < > 10.9*   < > 8.8* 8.6* 7.5* 8.1* 8.1* 7.8*   HCT 29.7*   < > 31.0* 24.6*   < > 16.6*   < > 33.7*   < > 33.6*   < > 29.3* 28.2* 23.1* 24.2* 24.5* 24.3*   MCV 97   < > 93 97  --  97   < > 97   < > 98   < > 101* 100 95 93 95 94      < > 183 172  --  168   < > 227   < > 194   < > 148* 135* 125* 134* 135* 165    < > = values in this interval not displayed.       Recent Labs   Lab Test 11/22/21  1803 04/21/23  1405 04/21/23  1405 02/05/24  1256 06/03/24  1644 08/13/24  1515 01/08/25  0742 01/10/25  0502 01/13/25  0514   BILITOTAL 0.3 0.2  --  0.2 0.2 0.3 0.2  --   --    ALKPHOS 121 56  --  69 58 68 72  --   --    ALBUMIN 3.1* 4.1   < > 4.0 3.8 3.8 3.6 2.8* 2.8*   AST 15 19  --  12 15 16 26  --   --    ALT 20 21  --  8 11 12 23  --   --     < > = values in this interval not displayed.       Recent Labs   Lab Test 11/01/17  1550 11/02/17  0828 05/19/18  0022 06/23/18  2347 06/26/18  0533 07/17/18  0830 07/24/18  0900 07/31/18  1148 08/17/21  0715 08/18/21  0727 08/19/21  0742 08/20/21  0734 06/03/24  1644 01/08/25  0742 01/08/25  0927   PCAL  --   --   --   --   --   --   --   --   --   --  4.17 2.32  --   --  0.39   LACT 0.6*  --  0.6*  --   --   --   --   --   --   --   --   --   --  0.5*  --    CRP  --    < > 5.7 5.3   < > 17.9* 10.2* <2.9 43.0* 51.1* 34.4* 23.7*  --   --   --    CRPI  --   --   --   --   --   --   --   --   --   --   --   --  20.80*  --   --    SED  --    < > 93* 93*  --  75* 97* 84*  --   --   --   --  101*  --   --     < > = values in this interval not displayed.       Recent Labs   Lab Test 01/10/25  0502   VANCOMYCIN 19.0       Culture Results:  7-Day Micro Results       Procedure Component Value Units Date/Time    Respiratory Aerobic Bacterial Culture with Gram Stain [[id number]] Collected: 01/09/25 3620    Order Status: Completed Lab Status: Final result Updated: 01/11/25 142    Specimen: Sputum from Endotracheal      Culture 1+ Normal Jaja      Gram Stain Result >25 PMNs/low power field      No organisms seen    MRSA MSSA PCR, Nasal Swab [[id number]] Collected: 01/09/25 1826    Order Status: Completed Lab Status: Final result Updated: 01/09/25 2218    Specimen: Swab from Nare, Left      MRSA Target DNA Negative     SA Target DNA Negative    Narrative:      The [employer]  Xpert SA Nasal Complete assay performed in the [employer]  Dx System is a qualitative in vitro diagnostic test designed for rapid detection of Staphylococcus aureus (SA) and methicillin-resistant Staphylococcus aureus (MRSA) from nasal swabs in patients at risk for nasal colonization. The test utilizes automated real-time polymerase chain reaction (PCR) to detect MRSA/SA DNA. The Xpert SA Nasal Complete assay is intended to aid in the prevention and control of MRSA/SA infections in healthcare settings. The assay is not intended to diagnose, guide or monitor treatment for MRSA/SA infections, or provide results of susceptibility to methicillin. A negative result does not preclude MRSA/SA nasal colonization.     Legionella Urinary Antigen and Streptococcus pneumoniae antigen     Order Status: Sent Lab Status: No result     Specimen: Urine     Blood Culture Line, venous [[id number]]  (Normal) Collected: 01/08/25 0927    Order Status: Completed Lab Status: Final result Updated: 01/13/25 1301    Specimen: Blood from Line, venous      Culture No Growth    Blood Culture Peripheral Blood [[id number]]  (Normal) Collected: 01/08/25 0742    Order Status: Completed Lab Status: Final result Updated: 01/13/25 1101    Specimen: Peripheral Blood      Culture No Growth            Recent Labs   Lab Test 11/02/17  0602 01/25/18  0233 03/29/18  1448 09/03/21  1331   URINEPH 7.5* 6.5 5.0 7.0   NITRITE Negative Negative Negative Negative   LEUKEST Small* Negative Negative Moderate*   WBCU 44* 1 2 10-25*                   Recent Labs   Lab Test 11/03/17  1825   CDBPCT Negative       Imaging: US Lower Extremity  Venous Duplex Bilateral    Result Date: 1/9/2025  EXAM: US LOWER EXTREMITY VENOUS DUPLEX BILATERAL LOCATION: [facility] DATE: 1/9/2025 INDICATION: Hypoxic, swelling COMPARISON: None. TECHNIQUE: Venous Duplex ultrasound of bilateral lower extremities with and without compression, augmentation and duplex. Color flow and spectral Doppler with waveform analysis performed. FINDINGS: Exam includes the common femoral, femoral, popliteal veins as well as segmentally visualized deep calf veins and greater saphenous vein. RIGHT: No deep vein thrombosis. No superficial thrombophlebitis. No popliteal cyst. LEFT: No deep vein thrombosis. No superficial thrombophlebitis. No popliteal cyst.     IMPRESSION: 1.  No deep venous thrombosis in the bilateral lower extremities.    XR Abdomen Port 1 View    Result Date: 1/9/2025  EXAM: XR ABDOMEN PORT 1 VIEW LOCATION: [facility] DATE: 1/9/2025 INDICATION: Feeding tube placement. COMPARISON: None.     IMPRESSION: Enteric tube tip in the body of the stomach.    XR Chest Port 1 View    Result Date: 1/9/2025  EXAM: XR CHEST PORT 1 VIEW LOCATION: [facility] DATE: 1/9/2025 INDICATION: Endotracheal tube positioning. COMPARISON: 1/8/2025     IMPRESSION: Endotracheal tube tip 3.3 cm from the tomasa. Scattered interstitial changes similar to previous. Enteric tube tip below the margin of the study.    XR Chest Port 1 View    Result Date: 1/8/2025  EXAM: XR CHEST PORT 1 VIEW LOCATION: [facility] DATE: 1/8/2025 INDICATION: SOB, cough x 3 days. COMPARISON: 11/23/2021     IMPRESSION: Peripheral interstitial changes noted, question interstitial edema. No consolidation.

## 2025-01-13 NOTE — PROGRESS NOTES
[facility] ICU RESPIRATORY NOTE        Date of Admission: 1/8/2025    Date of Intubation (most recent): (Most recent) 01/09/2025    Reason for Mechanical Ventilation: Airway protection    Number of Days on Mechanical Ventilation: 5    Met Criteria for Spontaneous Breathing Trial: No    Reason for No Spontaneous Breathing Trial: Per MD    Bite Block: Yes; please explain: No    Significant Events Today: None this shift.    ABG Results:   Recent Labs   Lab 01/09/25  1540 01/08/25  1151   O2PER 30 5         Current Vent Settings: FiO2 (%): 30 %, Resp: 16, Vent Mode: CMV/AC, Resp Rate (Set): 16 breaths/min, Tidal Volume (Set, mL): 480 mL, PEEP (cm H2O): 5 cmH2O, Pressure Support (cm H2O): 7 cmH2O, Resp Rate (Set): 16 breaths/min, Tidal Volume (Set, mL): 480 mL, PEEP (cm H2O): 5 cmH2O    Skin Assessment: Skin integrity is good    Plan: Continue full support.     Kerri Ambrosio RT on 1/13/2025 at 5:22 PM

## 2025-01-13 NOTE — Clinical Note
Dr Jackson,   I am the [facility] care coordinator for Supriya Herr I am writing to inform you that Supriya discharged to [facility] 2/26/25 following her hospitalization at [facility] 1/8/25- 2/26/25.  All of my documentation can be found in EPIC. Please do not hesitate to contact me with any questions or concerns.  Thank you,   Ellen Guerrero RN, BSN, PHN [facility] [phone] Fax: [phone]

## 2025-01-13 NOTE — PROGRESS NOTES
Renal Medicine Progress Note            Assessment/Plan:     Supriya Herr is a 74 year old female with ESRD who was admitted on 1/8/2025.      1) Influenza A with hypoxic resp failure.   Oseltamivir started at ESRD dose.   Mechanically ventilated    2) end-stage renal disease   Chronic dialysis at [facility], Tuesday Thursday Saturday   Primary nephrologist Dr. Basilio  Access: Right AV fistula,   Run time 3.5 hours     3) Anemia: HGB 7.8.  She is on Mircera as outpt.  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  Vit D 2000 units daily.  Renvela 1600 mg TID c meals and 800 mg with snacks.         Plan/Recs:  1) dialysis today planned with empiric ultrafiltration  2) EPO with HD      Aime Langley DO  [facility] consultants  Office: [phone]  Cell: [phone]        Interval History:     Patient remains mechanically ventilated, is sedated.  Hemodynamically stable    Blood pressure stable 130 systolic    Having total inputs of 2-2.2 L/day         Medications and Allergies:     Current Facility-Administered Medications   Medication Dose Route Frequency Provider Last Rate Last Admin    - MEDICATION INSTRUCTIONS for Dialysis Patients -   Does not apply See Admin Instructions Dusty Prieto MD        [Held by provider] amLODIPine (NORVASC) tablet 5 mg  5 mg Oral BID Marcell Grubbs PA-C   5 mg at 01/08/25 2217    atorvastatin (LIPITOR) tablet 20 mg  20 mg Oral or Feeding Tube QPM Kamryn Peguero MD   20 mg at 01/12/25 2105    B and C vitamin Complex with folic acid (NEPHRONEX) liquid 5 mL  5 mL Per Feeding Tube Daily Truman Moreland MD   5 mL at 01/13/25 0828    [Held by provider] carvedilol (COREG) tablet 25 mg  25 mg Oral BID w/meals Marcell Grubbs PA-C   25 mg at 01/08/25 1905    ceFEPIme (MAXIPIME) 1 g vial to attach to  mL bag for ADULTS or NS 50 mL bag for PEDS  1 g  Intravenous Q24H Truman Moreland MD   1 g at 01/12/25 1157    cetirizine (zyrTEC) tablet 5 mg  5 mg Oral or Feeding Tube At Bedtime Kamryn Peguero MD   5 mg at 01/12/25 2118    chlorhexidine (PERIDEX) 0.12 % solution 15 mL  15 mL Mouth/Throat Q12H Truman Moreland MD   15 mL at 01/13/25 0758    [Held by provider] furosemide (LASIX) tablet 40 mg  40 mg Oral Once per day on Tuesday Thursday Saturday Marcell Grubbs PA-C        And    [Held by provider] furosemide (LASIX) tablet 80 mg  80 mg Oral Once per day on Tuesday Thursday Saturday Marcell Grubbs PA-C        [Held by provider] furosemide (LASIX) tablet 80 mg  80 mg Oral 2 times per day on Sunday Monday Wednesday Friday Marcell Grubbs PA-C        gabapentin (NEURONTIN) solution 100 mg  100 mg Oral or Feeding Tube Daily Kamryn Peguero MD   100 mg at 01/13/25 0828    heparin ANTICOAGULANT injection 5,000 Units  5,000 Units Subcutaneous Q8H Marcell Grubbs PA-C   5,000 Units at 01/13/25 0603    insulin aspart (NovoLOG) injection (RAPID ACTING)  1-7 Units Subcutaneous Q4H John Renner MD   1 Units at 01/13/25 0807    [Held by provider] insulin glargine (LANTUS PEN) injection 12 Units  12 Units Subcutaneous At Bedtime Marcell Grubbs PA-C   12 Units at 01/08/25 2212    ipratropium - albuterol 0.5 mg/2.5 mg/3 mL (DUONEB) neb solution 3 mL  3 mL Nebulization 4x daily Marcell Grubbs PA-C   3 mL at 01/13/25 0735    oseltamivir (TAMIFLU) suspension 30 mg  30 mg Oral or Feeding Tube Q48H John Renner MD        pantoprazole (PROTONIX) 2 mg/mL suspension 40 mg  40 mg Per Feeding Tube QAM AC Truman Moreland MD   40 mg at 01/13/25 0758    Or    pantoprazole (PROTONIX) IV push injection 40 mg  40 mg Intravenous QAM AC Truman Moreland MD        sertraline (ZOLOFT) tablet 75 mg  75 mg Oral or Feeding Tube QPM Kamryn Peguero MD   75 mg at 01/12/25 2104    sevelamer carbonate (RENVELA) tablet 800 mg  800 mg Oral or  "Feeding Tube TID w/meals Kamryn Peguero MD   800 mg at 01/13/25 0758    sodium chloride (PF) 0.9% PF flush 3 mL  3 mL Intracatheter Q8H Marcell Grubbs PA-C   3 mL at 01/13/25 0602    tacrolimus (PROTOPIC) 0.1 % ointment   Topical BID Marcell Grubbs PA-C   Given at 01/13/25 0759    vitamin D3 (CHOLECALCIFEROL) tablet 50 mcg  50 mcg Oral or Feeding Tube Daily Kamryn Peguero MD   50 mcg at 01/13/25 0758        Allergies   Allergen Reactions    Ampicillin-Sulbactam Sodium Rash     No evidence SJS, but very uncomfortable and precipitated multiple provider visits. Would not use penicillins again if other options available.     Penicillins Rash            Physical Exam:   Vitals were reviewed  /48   Pulse 62   Temp 97  F (36.1  C) (Axillary)   Resp 15   Ht 1.702 m (5' 7\")   Wt 98.8 kg (217 lb 13 oz)   LMP  (LMP Unknown)   SpO2 95%   BMI 34.11 kg/m      Wt Readings from Last 3 Encounters:   01/13/25 98.8 kg (217 lb 13 oz)   06/14/24 101.1 kg (222 lb 14.2 oz)   06/03/24 101.1 kg (222 lb 14.2 oz)       Intake/Output Summary (Last 24 hours) at 1/13/2025 1133  Last data filed at 1/13/2025 1000  Gross per 24 hour   Intake 2178.77 ml   Output --   Net 2178.77 ml       GENERAL APPEARANCE: intubated, sedated  HEENT:  Eyes/ears/nose/neck grossly normal  RESP: + vent sounds, lungs cta b c good efforts, no crackles, rhonchi or wheezes  CV: RRR, nl S1/S2, no m/r/g   ABDOMEN: o/s/nt/nd, bs present  EXTREMITIES/SKIN: no c/c/rashes/lesions; 1+ edema  NEURO:  sedated           Data:     BMP  Recent Labs   Lab 01/13/25  0807 01/13/25  0557 01/13/25  0514 01/13/25  0117 01/12/25  0845 01/12/25  0621 01/11/25  0827 01/11/25  0457 01/10/25  0928 01/10/25  0502   NA  --   --  135  --   --  136  --  137  --  136   POTASSIUM  --   --  4.7  --   --  4.3  --  3.7  --  3.6   CHLORIDE  --   --  94*  --   --  96*  --  100  --  102   JODY  --   --  9.7  --   --  9.2  --  8.8  --  8.7*   CO2  --   --  27  --   --  28  " --  28  --  21*   BUN  --   --  59.2*  --   --  44.5*  --  22.2  --  46.4*   CR  --   --  5.73*  --   --  4.78*  --  3.48*  --  6.50*   * 146* 169* 139*   < > 183*   < > 135*   < > 80    < > = values in this interval not displayed.     CBC  Recent Labs   Lab 01/13/25  0514 01/12/25  0621 01/11/25  0457 01/10/25  0502   WBC 4.6 3.7* 4.2 3.5*   HGB 7.8* 8.1* 8.1* 7.5*   HCT 24.3* 24.5* 24.2* 23.1*   MCV 94 95 93 95    135* 134* 125*     Lab Results   Component Value Date    AST 26 01/08/2025    ALT 23 01/08/2025    ALKPHOS 72 01/08/2025    BILITOTAL 0.2 01/08/2025     Lab Results   Component Value Date    INR 1.11 04/24/2024       Attestation:  I have reviewed today's vital signs, notes, medications, labs and imaging.    DO [facility] - [facility]ph[facility]  Office: [phone]  Cell: [phone]

## 2025-01-13 NOTE — PLAN OF CARE
Goal Outcome Evaluation:      Plan of Care Reviewed With: patient    Overall Patient Progress: no changeOverall Patient Progress: no change    Outcome Evaluation: Patient continues to be vented. Failed P/s trial during day shift, remained full support for night. Anuric. On HD which will be planned for today. Follows commands intermittently. sedated w/ propofol. SBP slightly hypertensive. TF at goal rate of 50. SR, PERRL. Will continue with plan of care.      Problem: Adult Inpatient Plan of Care  Goal: Absence of Hospital-Acquired Illness or Injury  Intervention: Identify and Manage Fall Risk  Recent Flowsheet Documentation  Taken 1/13/2025 0000 by Donovan Long, RN  Safety Promotion/Fall Prevention:   clutter free environment maintained   lighting adjusted   safety round/check completed  Taken 1/12/2025 2000 by Donovan Long, RN  Safety Promotion/Fall Prevention:   clutter free environment maintained   lighting adjusted   safety round/check completed

## 2025-01-13 NOTE — Clinical Note
Dr Jackson,  I am the [facility] care coordinator for Supriya TATIANA ROSAS that Supriya has been readmitted to  [facility]  All of my documentation can be found in EPIC. Please do not hesitate to contact me with any questions or concerns.  Thank you,   Ellen Guerrero RN, BSN, PHN [facility] [phone] Fax: [phone]

## 2025-01-13 NOTE — Clinical Note
I am the Critical access hospital care coordinator for Supriya eHrr This is an FYI that Supriya discharged home on 4/17 under the care of her daughter Caroline. They will see you in clinic on 4/24 for hospital follow up.   All of my documentation can be found in EPIC. Please do not hesitate to contact me with any questions or concerns.  Thank you,   Ellen Guerrero RN, BSN, PHN [facility] [phone] Fax: [phone]

## 2025-01-13 NOTE — PROGRESS NOTES
TRANSITIONS OF CARE (DAVI) LOG    DAVI tasks should be completed by the CC within one (1) business day of notification of each transition. Follow up contact with member is required after return to their usual care setting.  Note:  If CC finds out about the transitions fifteen (15) days or more after the member has returned to their usual care setting, no DAVI log is needed. However, the CC should check in with the member to discuss the transition process, any changes needed to the care plan and document it in a case note.     Member Name:  Supriya [facility] Name:  [employer] Member ID#:  [id number]   [place] [facility] [place] Contact:  Ellen Guerrero RN, PHN Agency/[facility]/Care System: [facility]   Transition Communication Actions from Care Management Contact   Transition #1   Notification Date: 1/13/25 Transition Date:   1/8/25 Transition From: Home     Is this the member s usual care setting?               yes Transition To: [facility]   Transition Type:  Unplanned    Documentation from conversation with the member/responsible party, provider, discharging and receiving facility:   Date: 1/13/25: Received notification of admission to hospital with dx of ESRD (end stage renal disease) on dialysis (H)    Influenza A    Elevated troponin    Abnormal chest x-ray    Acute and chronic respiratory failure with hypoxia (H)    Anemia, unspecified type  CC contacted Hospital /discharge plannerTammy via the [employer] Transitional Care Hand-In Process, with community care plan included.  CC reached out to adult daughter Caroline Gray  regarding transition and offered support as needed.  Reviewed and update support plan as needed.  Notified community service providers and placed services [employer] [facility] on hold as needed.  Transition log initiated.   PCP, Noam Jackson, notified of hospitalization via EMR.  Ellen Guerrero RN, BSN,  [facility]  [phone]  Fax: [phone]       Transition #2   Notification Date: 2/26/25 Transition Date:   2/26/25 Transition From: [facility]     Is this the member s usual care setting?               no Transition To:  [facility]   Transition Type:  Planned    Documentation from conversation with the member/responsible party, provider, discharging and receiving facility:   Date: 2/27/25: Received notification of admission TCU for  post hospital stay.  OBRA 1 right faxed to residential admissions office.   CC contacted Nursing Home LSW ([facility]) and left a message with this CC contact information, reviewed community support plan as well requested to be notified of concerns, care conferences and discharge planning.  CC reached out to adult daughter Caroline Gray  regarding transition and left a message requesting a return call.    Transition log updated.   PCP, Noam Jackson, notified of transition to TCU via EMR.    Ellen Guerrero RN, BSN, [facility]  [phone]  Fax: [phone]    2/27/25-  Return call from [facility].  CC introduced self and requested updates as appropriate.   Ellen Guerrero RN, BSN, [facility]  [phone]  Fax: [phone]           Transition #3   Notification Date: 3/3/25 Transition Date:   3/2/25 Transition From:  [facility]     Is this the member s usual care setting?               no Transition To: [facility]   Transition Type:  Unplanned    Documentation from conversation with the member/responsible party, provider, discharging and receiving facility:   Date: 3/3/25: Received notification of admission to hospital with dx of Acute on chronic systolic congestive heart failure (H)    Pulmonary vascular congestion    ESRD on hemodialysis (H)    Dyspnea, unspecified type  CC contacted Hospital /discharge planner,   (Mitra for Name and Phone Number)  and left a message with this CC contact information, reviewed community support plan as well requested to be notified of concerns, care conferences and discharge planning.  CC reached out to adult daughter Caroline  regarding transition and left a message requesting a return call.  Reviewed and update support plan as needed.  Notified community service providers and placed services None on hold as needed.  Transition log initiated.   PCP, Noam Jackson, notified of hospitalization via EMR.    Ellen Guerrero RN, BSN, [facility]  [phone]  Fax: [phone]         Transition #4   Notification Date: 3/10/25 Transition Date:   3/6/25 Transition From: [facility]     Is this the member s usual care setting?               no Transition To: [facility] TCU    Transition Type:  Planned    Documentation from conversation with the member/responsible party, provider, discharging and receiving facility:   Date: 3/10/25: Received notification of admission TCU for  post hospital stay.  OBRA 1 right faxed to prison admissions [facility].   CC contacted Nursing Home LSW: NA- has been readmitted to [facility]   CC reached out to: NA  Has since been readmitted to [facility]  Transition log updated.   PCP, Noam Jackson, notified of transition to TCU via EMR.    Ellen Guerrero RN, BSN, [facility]  [phone]  Fax: [phone]         Transition #5   Notification Date: 3/10/25 Transition Date:   3/9/25 Transition From: [facility] TCU     Is this the member s usual care setting?               no Transition To: [facility]   Transition Type:  Unplanned    Documentation from conversation with the member/responsible party, provider, discharging and receiving facility:   Date: 3/10/25: Received notification of admission to hospital with dx of Hypoxia  CC contacted [facility]  worker/discharge planner, Cat) via the [facility] Transitional Care Hand-In Process, with community care plan included.  CC reached out to adult daughter Caroline  regarding transition and left a message requesting a return call.  Reviewed and update support plan as needed.  Notified community service providers and placed services None on hold as needed.  Transition log initiated.   PCP, Noam Jackson, notified of hospitalization via EMR.    Ellen Guerrero RN, BSN, PHN      Transition #6   Notification Date: 3/18/25 Transition Date:   3/17/25 Transition From: [facility]     Is this the member s usual care setting?               no [facility]   Transition Type:  Planned    Documentation from conversation with the member/responsible party, provider, discharging and receiving facility:   Date: 3/18/25: Received notification of admission TCU for post hospital stay.  OBRA 1 right faxed to correction admissions office.   CC contacted Nursing Home LSW ([facility] JE[facility]) and left a message with this CC contact information, reviewed community support plan as well requested to be notified of concerns, care conferences and discharge planning.  CC reached out to adult daughter Caroline  regarding transition and left a message requesting a return call.    Transition log updated.   PCP, Noam Jackson, notified of transition to TCU via EMR.    Ellen Guerrero RN, BSN, PHN  [facility]  [phone]  Fax: [phone]       Transition #7   Notification Date: 3/31/25 Transition Date:   3/27/25 Transition From: [facility]     Is this the member s usual care setting?               no Transition To: [facility]   Transition Type:  Unplanned    Documentation from conversation with the member/responsible party, provider, discharging and receiving facility:   Date:  3/31/25: Received notification of admission to hospital with dx of ESRD on hemodialysis, Generalized weakness, Constipation, Leukocytosis  CC contacted Hospital /discharge planner, Lauren via the [employer] Transitional Care Hand-In Process, with community care plan included.  CC reached out to adult daughter Caroline  regarding transition and offered support as needed.  Reviewed and update support plan as needed.  Notified community service providers and placed services None on hold as needed.  Transition log initiated.     Caroline states that she is requesting that Supriya be discharged home and not back to TCU.  Will need EW services reopened at time of discharge.   PCP, Noam Jackson, notified of hospitalization via EMR.    4/1/25- Spoke to Caroline.  States no discharge plans at this time.  Is enquiring about adapted vehicles.  CC provides education and emailed resources.   4/10- Planning for discharge.  No set date at this time.  Caroline is hopeful to discharge her mother to her home.   4/16- Potential discharge for 4/17 or 4/18.    Ellen Guerrero RN, BSN, [facility]  [phone]  Fax: [phone]        Ellen Guerrero RN, BSN, [facility]  [phone]  Fax: [phone]       Transition #8   Notification Date: 4/21/25 Transition Date:   4/17/25 Transition From: [facility]     Is this the member s usual care setting?               no Transition To: Home   Transition Type:  Planned    Documentation from conversation with the member/responsible party, provider, discharging and receiving facility:   Date: 4/21/25: Received notification of transition to home.  CC contacted adult daughter Caroline  and in home assessment completed for EW needs.  Member has a follow-up appointment with PCP in 7 days: Yes: scheduled on 4/24    Member has had a change in condition: Yes: Change in condition assessment completed 4/21/25  Home visit needed: Yes- completed  4/21/25  Support Plan reviewed and updated.  The following home based services PCA were resumed.  New referrals placed: Yes: RN Therapies: PT OT with [facility].   Transition log completed.   PCP, Noam Jackson, notified of transition back to home via EMR.    Ellen Guerrero RN, BSN, PHN  [facility]  [phone]  Fax: [phone]           *RETURN TO USUAL CARE SETTING: *Complete tasks below when the member is discharging TO their usual care setting within one (1) business day of notification..      For situations where the Care Coordinator is notified of the discharge prior to the date of discharge, the Care Coordinator must follow up with the member or designated representative to confirm that discharge actually occurred and discuss required DAVI tasks as outlined in the DAVI Instructions.  (This includes situations where it may be a  new  usual care setting for the member. (i.e., a community member who decides upon permanent nursing home placement following hospitalization and rehab).    Discuss with Member/Responsible Party:    Check  Yes  - if the member, family member and/or SNF/facility staff manages the following:    If  No  provide explanation in the comments section.          Date completed: 4/21/25  Communicated with member or their designated representative about the following:  care transition process; about changes to the member s health status; plan of care updates; education about transitions and how to prevent unplanned transitions/readmissions    Four Pillars for Optimal Transition:    Check  Yes  - if the member, family member and/or SNF/facility staff manages the following:    If  No  provide explanation in the comments section.          [x]  Yes     []  No Does the member have a follow-up appointment scheduled with primary care or specialist? (Mental health hospitalizations--the appt. should be w/in 7 days)              For mental health hospitalizations:  []  Yes     []  No      Does the member have a follow-up appointment scheduled with a mental health practitioner within 7 days of discharge?  [x]  Yes     []  No     Has a medication review been completed with member? If no, refer to PCP, home care nurse, MTM, pharmacist  [x]  Yes     []  No     Can the member manage their medications or is there a system in place to manage medications (e.g. home care set-up)?         [x]  Yes     []  No     Can the member verbalize warning signs and symptoms to watch for and how to respond?  [x]  Yes     []  No     Does the member have a copy of and understand their discharge instructions?  If no, assist to obtain copy of discharge instructions, review discharge instructions, and assist to contact PCP to discuss questions about their recent hospitalization.  [x]  Yes     []  No     Does the member have adequate food, housing and transportation?  If no, add goal and discuss additional supports available to the member                                                                                                                                                                                 [x]  Yes     []  No     Is the member safe in their home?  If no, document needs and support provided                                                                                                                                                                          []  Yes     [x]  No     Are there any concerns of vulnerability, abuse, or neglect?  If yes, document concerns and actions taken by Care Coordinator as a mandated                                                                                                                                                                              [x]  Yes     []  No     Does the member use a Personal Health Care Record?  Check  Yes  if visit summary, discharge summary, and/or healthcare summary are being used as a PHR.                                                                                                                                                                                   [x]  Yes     []  No     Have you reviewed the discharge summary with the member? If  No  provide explanation in comments.  [x]  Yes     []  No     Have you updated the member s care plan/support plan? Add new diagnosis, medications, treatments, goals & interventions, as applicable. If No, provide explanation in comments.    Comments:           Notes from conversation with the member/responsible party, provider, discharging and receiving facility (as applicable):     Supriya discharged home after 3 months of transitioning between TCU and inpt on 4/17.  Lives with daughter Caroline, daughter BARRY, and grandson.  Daughter Caroline is very involved and main caregiver. Caroline is PCA.  Change in condition assessment completed 4/22/25.  [facility] and [facility] have been out to start care.  All RX in home. Caroline reports is managing fine at home.  Follow-up schedule with pcp.     Ellen Guerrero RN, BSN, PHN  [facility]  [phone]  Fax: [phone]

## 2025-01-13 NOTE — PROGRESS NOTES
CLINICAL NUTRITION SERVICES - REASSESSMENT NOTE     Malnutrition Status:    % Intake: No decreased intake noted  % Weight Loss: Weight loss does not meet criteria  (fluids)  Subcutaneous Fat Loss: None observed (visual in isolation)  Muscle Loss: None observed however may be masked by obesity (visual in isolation)  Fluid Accumulation/Edema: Mild, 1+ (likely not nutritional)  Malnutrition Diagnosis: Patient does not meet two of the established criteria necessary for diagnosing malnutrition  Malnutrition Present on Admission: Unable to assess       SUBJECTIVE INFORMATION  Patient not available for interview due to intubated and sedated     CURRENT NUTRITION ORDERS  Diet: NPO on vent  Nutrition Support: Patient started on TF 1/9, advanced to goal on 1/10, and continues as follows ~    Nutrition Support Enteral:  Type of Feeding Tube: OG  Enteral Frequency:  Continuous  Enteral Regimen: Vital HP at 50 mL/hr  Total Enteral Provisions: 1200 kcal, 104 g protein (1.9 g/kg), 133 g CHO, 1003 mL H2O  Free Water Flush: 60 mL every 4 hours  Propofol at 20.1 mL/hr= 531 kcal   Total = 1731 kcal (18 kcal/kg and 107% needs)      CURRENT INTAKE/TOLERANCE  Patient tolerating above without any documented issues      NEW FINDINGS  Weight: 98.8 kg (1/13) -- trending downward, likely fluids with HD   Skin/wounds: Left buttocks wound d/t friction, history of left AKA   GI symptoms: No stool yet  Nutrition-relevant labs:  K/Mg/Phos NL, -170, BUN 59.2 (H), Cr 5.73 (H) - HD   Nutrition-relevant medications:  Propofol at 20.1 mL/hr= 531 kcal, Nephronex, Lasix for fluid overload     ASSESSED NUTRITION NEEDS  Estimated Energy Needs: 9941-2682 kcals/day (14 - 17 kcals/kg)  Justification: Obese and Vented  Estimated Protein Needs:  grams protein/day (1.5 - 2 grams of pro/kg)  Justification: Hypercatabolism with critical illness and Obesity guidelines  Estimated Fluid Needs: 1600 mL/day (1 mL/kcal)  Justification:  or per MD      MALNUTRITION  % Intake: No decreased intake noted  % Weight Loss: Weight loss does not meet criteria  (fluids)  Subcutaneous Fat Loss: None observed (visual in isolation)  Muscle Loss: None observed however may be masked by obesity (visual in isolation)  Fluid Accumulation/Edema: Mild, 1+ (likely not nutritional)  Malnutrition Diagnosis: Patient does not meet two of the established criteria necessary for diagnosing malnutrition  Malnutrition Present on Admission: Unable to assess    EVALUATION OF THE PROGRESS TOWARD GOALS   Previous Goals (1/9)  Goal TF regimen will meet % needs   Evaluation: Met    Previous Nutrition Diagnosis (1/9)  Inadequate protein energy intake related to NPO with plans to start TF today as evidenced by meeting 0% protein and 20% energy needs from Propofol   Evaluation: Resolved    NUTRITION DIAGNOSIS  No nutrition diagnosis at this time as TF meeting needs     INTERVENTIONS  Team meeting involving nutrition professional - Patient discussed today during interdisciplinary bedside rounds    Goals  Goal TF regimen will continue to meet % needs      Monitoring/Evaluation      Progress toward goals will be monitored and evaluated per policy    Miguelina Feliciano, RD, LD, CNSC   Clinical Dietitian - [facility]

## 2025-01-13 NOTE — PLAN OF CARE
Problem: Adult Inpatient Plan of Care  Goal: Plan of Care Review    Outcome: Progressing  Flowsheets (Taken 1/13/2025 1645)  Plan of Care Reviewed With:   patient   child  Overall Patient Progress: no change  Goal: Patient-Specific Goal (Individualized)    Outcome: Progressing  Goal: Absence of Hospital-Acquired Illness or Injury  Outcome: Progressing  Intervention: Identify and Manage Fall Risk  Recent Flowsheet Documentation  Taken 1/13/2025 1600 by Nicole Childress RN  Safety Promotion/Fall Prevention:   activity supervised   increased rounding and observation   room near nurse's station   treat reversible contributory factors   supervised activity   safety round/check completed  Taken 1/13/2025 1200 by Nicole Childress RN  Safety Promotion/Fall Prevention:   activity supervised   increased rounding and observation   room near nurse's station   treat reversible contributory factors   supervised activity   safety round/check completed  Intervention: Prevent Skin Injury  Recent Flowsheet Documentation  Taken 1/13/2025 1600 by Nicole Childress RN  Body Position:   turned   heels elevated   side-lying 30 degrees  Skin Protection:   adhesive use limited   tubing/devices free from skin contact   silicone foam dressing in place   pulse oximeter probe site changed  Taken 1/13/2025 1400 by Nicole Childress RN  Body Position:   turned   side-lying 30 degrees  Taken 1/13/2025 1200 by Nicole Childress RN  Body Position:   turned   heels elevated   side-lying 30 degrees  Skin Protection:   adhesive use limited   tubing/devices free from skin contact   silicone foam dressing in place   pulse oximeter probe site changed  Intervention: Prevent and Manage VTE (Venous Thromboembolism) Risk  Recent Flowsheet Documentation  Taken 1/13/2025 1600 by Nicole Childress RN  VTE Prevention/Management: (BP cuff on RLE) SCDs off (sequential compression devices)  Taken 1/13/2025 1200 by Nicole Childress RN  VTE Prevention/Management: (BP cuff on RLE)  SCDs off (sequential compression devices)  Intervention: Prevent Infection  Recent Flowsheet Documentation  Taken 1/13/2025 1600 by Nicole Childress RN  Infection Prevention:   rest/sleep promoted   equipment surfaces disinfected   hand hygiene promoted  Taken 1/13/2025 1200 by Nicole Childress RN  Infection Prevention:   rest/sleep promoted   equipment surfaces disinfected   hand hygiene promoted  Goal: Optimal Comfort and Wellbeing  Outcome: Progressing  Intervention: Provide Person-Centered Care  Recent Flowsheet Documentation  Taken 1/13/2025 1600 by Nciole Childress RN  Trust Relationship/Rapport:   care explained   reassurance provided  Taken 1/13/2025 1200 by Nicole Childress RN  Trust Relationship/Rapport:   care explained   reassurance provided  Goal: Readiness for Transition of Care  Outcome: Progressing   Goal Outcome Evaluation:      Plan of Care Reviewed With: patient, child    Overall Patient Progress: no changeOverall Patient Progress: no change    PERRL. Sedated on propofol @ 35ml/hr. Withdraws to painful stimuli. Tele NSR. LS diminished. Vented 30%, PEEP 5. Redness under breasts and pannus, interdry I place. No BM, PRN senna given. Dialysis in process, plan SBT tomorrow AM. Daughter updated via phone.

## 2025-01-13 NOTE — PROGRESS NOTES
[facility] ICU RESPIRATORY NOTE        Date of Admission: 1/8/2025  Date of Admission: 1/8/2025     Date of Intubation (most recent):  1/9/24     Reason for Mechanical Ventilation: AW protection     Number of Days on Mechanical Ventilation: 5     Met Criteria for Spontaneous Breathing Trial: Yes  PS 7/5     Significant Events Today: None      ABG Results:   Recent Labs   Lab 01/09/25  1540 01/08/25  1151   O2PER 30 5         Current Vent Settings: FiO2 (%): 30 %, Resp: 16, Vent Mode: CMV/AC, Resp Rate (Set): 16 breaths/min, Tidal Volume (Set, mL): 480 mL, PEEP (cm H2O): 5 cmH2O, Pressure Support (cm H2O): 7 cmH2O, Resp Rate (Set): 16 breaths/min, Tidal Volume (Set, mL): 480 mL, PEEP (cm H2O): 5 cmH2O    Skin Assessment: clean, intact      Meshac C Khoi, RT on 1/13/2025 at 6:08 AM

## 2025-01-14 ENCOUNTER — APPOINTMENT (OUTPATIENT)
Dept: GENERAL RADIOLOGY | Facility: CLINIC | Age: 75
End: 2025-01-14
Attending: INTERNAL MEDICINE
Payer: COMMERCIAL

## 2025-01-14 LAB
ANION GAP SERPL CALCULATED.3IONS-SCNC: 12 MMOL/L (ref 7–15)
BUN SERPL-MCNC: 31 MG/DL (ref 8–23)
CALCIUM SERPL-MCNC: 9.5 MG/DL (ref 8.8–10.4)
CHLORIDE SERPL-SCNC: 93 MMOL/L (ref 98–107)
CREAT SERPL-MCNC: 3.23 MG/DL (ref 0.51–0.95)
EGFRCR SERPLBLD CKD-EPI 2021: 14 ML/MIN/1.73M2
ERYTHROCYTE [DISTWIDTH] IN BLOOD BY AUTOMATED COUNT: 14.4 % (ref 10–15)
GLUCOSE BLDC GLUCOMTR-MCNC: 130 MG/DL (ref 70–99)
GLUCOSE BLDC GLUCOMTR-MCNC: 136 MG/DL (ref 70–99)
GLUCOSE BLDC GLUCOMTR-MCNC: 163 MG/DL (ref 70–99)
GLUCOSE BLDC GLUCOMTR-MCNC: 170 MG/DL (ref 70–99)
GLUCOSE BLDC GLUCOMTR-MCNC: 195 MG/DL (ref 70–99)
GLUCOSE BLDC GLUCOMTR-MCNC: 214 MG/DL (ref 70–99)
GLUCOSE SERPL-MCNC: 137 MG/DL (ref 70–99)
HCO3 SERPL-SCNC: 27 MMOL/L (ref 22–29)
HCT VFR BLD AUTO: 25 % (ref 35–47)
HGB BLD-MCNC: 8 G/DL (ref 11.7–15.7)
MCH RBC QN AUTO: 30.7 PG (ref 26.5–33)
MCHC RBC AUTO-ENTMCNC: 32 G/DL (ref 31.5–36.5)
MCV RBC AUTO: 96 FL (ref 78–100)
PLATELET # BLD AUTO: 188 10E3/UL (ref 150–450)
POTASSIUM SERPL-SCNC: 4 MMOL/L (ref 3.4–5.3)
RBC # BLD AUTO: 2.61 10E6/UL (ref 3.8–5.2)
SODIUM SERPL-SCNC: 132 MMOL/L (ref 135–145)
WBC # BLD AUTO: 5 10E3/UL (ref 4–11)

## 2025-01-14 PROCEDURE — 200N000001 HC R&B ICU

## 2025-01-14 PROCEDURE — 250N000011 HC RX IP 250 OP 636: Mod: JZ | Performed by: INTERNAL MEDICINE

## 2025-01-14 PROCEDURE — 250N000013 HC RX MED GY IP 250 OP 250 PS 637: Performed by: INTERNAL MEDICINE

## 2025-01-14 PROCEDURE — 94640 AIRWAY INHALATION TREATMENT: CPT | Mod: 76

## 2025-01-14 PROCEDURE — 634N000001 HC RX 634: Mod: JZ | Performed by: INTERNAL MEDICINE

## 2025-01-14 PROCEDURE — 250N000013 HC RX MED GY IP 250 OP 250 PS 637: Performed by: STUDENT IN AN ORGANIZED HEALTH CARE EDUCATION/TRAINING PROGRAM

## 2025-01-14 PROCEDURE — 36415 COLL VENOUS BLD VENIPUNCTURE: CPT | Performed by: STUDENT IN AN ORGANIZED HEALTH CARE EDUCATION/TRAINING PROGRAM

## 2025-01-14 PROCEDURE — 999N000157 HC STATISTIC RCP TIME EA 10 MIN

## 2025-01-14 PROCEDURE — 99232 SBSQ HOSP IP/OBS MODERATE 35: CPT | Performed by: INTERNAL MEDICINE

## 2025-01-14 PROCEDURE — P9047 ALBUMIN (HUMAN), 25%, 50ML: HCPCS | Mod: JZ | Performed by: INTERNAL MEDICINE

## 2025-01-14 PROCEDURE — 99291 CRITICAL CARE FIRST HOUR: CPT | Performed by: INTERNAL MEDICINE

## 2025-01-14 PROCEDURE — 250N000011 HC RX IP 250 OP 636: Performed by: INTERNAL MEDICINE

## 2025-01-14 PROCEDURE — 250N000011 HC RX IP 250 OP 636: Performed by: STUDENT IN AN ORGANIZED HEALTH CARE EDUCATION/TRAINING PROGRAM

## 2025-01-14 PROCEDURE — 90935 HEMODIALYSIS ONE EVALUATION: CPT

## 2025-01-14 PROCEDURE — 250N000009 HC RX 250: Performed by: STUDENT IN AN ORGANIZED HEALTH CARE EDUCATION/TRAINING PROGRAM

## 2025-01-14 PROCEDURE — 94640 AIRWAY INHALATION TREATMENT: CPT

## 2025-01-14 PROCEDURE — 82435 ASSAY OF BLOOD CHLORIDE: CPT | Performed by: STUDENT IN AN ORGANIZED HEALTH CARE EDUCATION/TRAINING PROGRAM

## 2025-01-14 PROCEDURE — 250N000011 HC RX IP 250 OP 636: Performed by: NURSE PRACTITIONER

## 2025-01-14 PROCEDURE — 85027 COMPLETE CBC AUTOMATED: CPT | Performed by: STUDENT IN AN ORGANIZED HEALTH CARE EDUCATION/TRAINING PROGRAM

## 2025-01-14 PROCEDURE — 258N000003 HC RX IP 258 OP 636: Performed by: INTERNAL MEDICINE

## 2025-01-14 PROCEDURE — 80048 BASIC METABOLIC PNL TOTAL CA: CPT | Performed by: STUDENT IN AN ORGANIZED HEALTH CARE EDUCATION/TRAINING PROGRAM

## 2025-01-14 PROCEDURE — 94003 VENT MGMT INPAT SUBQ DAY: CPT

## 2025-01-14 PROCEDURE — 71045 X-RAY EXAM CHEST 1 VIEW: CPT

## 2025-01-14 RX ORDER — METHYLPREDNISOLONE SODIUM SUCCINATE 40 MG/ML
40 INJECTION INTRAMUSCULAR; INTRAVENOUS EVERY 8 HOURS
Status: DISCONTINUED | OUTPATIENT
Start: 2025-01-14 | End: 2025-01-17

## 2025-01-14 RX ORDER — ALBUMIN (HUMAN) 12.5 G/50ML
12.5 SOLUTION INTRAVENOUS ONCE
Status: COMPLETED | OUTPATIENT
Start: 2025-01-14 | End: 2025-01-14

## 2025-01-14 RX ORDER — FUROSEMIDE 20 MG/1
80 TABLET ORAL
Status: DISCONTINUED | OUTPATIENT
Start: 2025-01-14 | End: 2025-01-25

## 2025-01-14 RX ADMIN — IPRATROPIUM BROMIDE AND ALBUTEROL SULFATE 3 ML: .5; 3 SOLUTION RESPIRATORY (INHALATION) at 16:41

## 2025-01-14 RX ADMIN — IPRATROPIUM BROMIDE AND ALBUTEROL SULFATE 3 ML: .5; 3 SOLUTION RESPIRATORY (INHALATION) at 11:33

## 2025-01-14 RX ADMIN — TACROLIMUS: 1 OINTMENT TOPICAL at 21:00

## 2025-01-14 RX ADMIN — SEVELAMER CARBONATE 800 MG: 800 TABLET, FILM COATED ORAL at 07:49

## 2025-01-14 RX ADMIN — CHLORHEXIDINE GLUCONATE 15 ML: 1.2 RINSE ORAL at 20:44

## 2025-01-14 RX ADMIN — FUROSEMIDE 80 MG: 20 TABLET ORAL at 13:41

## 2025-01-14 RX ADMIN — CETIRIZINE HYDROCHLORIDE 5 MG: 5 TABLET ORAL at 22:08

## 2025-01-14 RX ADMIN — GABAPENTIN 100 MG: 250 SOLUTION ORAL at 07:51

## 2025-01-14 RX ADMIN — Medication 5 ML: at 07:51

## 2025-01-14 RX ADMIN — INSULIN ASPART 1 UNITS: 100 INJECTION, SOLUTION INTRAVENOUS; SUBCUTANEOUS at 08:14

## 2025-01-14 RX ADMIN — PROPOFOL 10 MCG/KG/MIN: 10 INJECTION, EMULSION INTRAVENOUS at 14:15

## 2025-01-14 RX ADMIN — INSULIN ASPART 2 UNITS: 100 INJECTION, SOLUTION INTRAVENOUS; SUBCUTANEOUS at 20:58

## 2025-01-14 RX ADMIN — SENNOSIDES AND DOCUSATE SODIUM 2 TABLET: 50; 8.6 TABLET ORAL at 07:50

## 2025-01-14 RX ADMIN — IPRATROPIUM BROMIDE AND ALBUTEROL SULFATE 3 ML: .5; 3 SOLUTION RESPIRATORY (INHALATION) at 07:23

## 2025-01-14 RX ADMIN — TACROLIMUS: 1 OINTMENT TOPICAL at 07:50

## 2025-01-14 RX ADMIN — HEPARIN SODIUM 5000 UNITS: 5000 INJECTION, SOLUTION INTRAVENOUS; SUBCUTANEOUS at 13:42

## 2025-01-14 RX ADMIN — HEPARIN SODIUM 5000 UNITS: 5000 INJECTION, SOLUTION INTRAVENOUS; SUBCUTANEOUS at 06:16

## 2025-01-14 RX ADMIN — SEVELAMER CARBONATE 800 MG: 800 TABLET, FILM COATED ORAL at 17:48

## 2025-01-14 RX ADMIN — Medication 50 MCG: at 07:50

## 2025-01-14 RX ADMIN — Medication: at 21:12

## 2025-01-14 RX ADMIN — ALBUMIN HUMAN 12.5 G: 0.25 SOLUTION INTRAVENOUS at 22:26

## 2025-01-14 RX ADMIN — METHYLPREDNISOLONE SODIUM SUCCINATE 40 MG: 40 INJECTION, POWDER, FOR SOLUTION INTRAMUSCULAR; INTRAVENOUS at 13:42

## 2025-01-14 RX ADMIN — Medication 40 MG: at 07:49

## 2025-01-14 RX ADMIN — INSULIN ASPART 1 UNITS: 100 INJECTION, SOLUTION INTRAVENOUS; SUBCUTANEOUS at 11:59

## 2025-01-14 RX ADMIN — METHYLPREDNISOLONE SODIUM SUCCINATE 40 MG: 40 INJECTION, POWDER, FOR SOLUTION INTRAMUSCULAR; INTRAVENOUS at 20:42

## 2025-01-14 RX ADMIN — SODIUM CHLORIDE 250 ML: 9 INJECTION, SOLUTION INTRAVENOUS at 21:12

## 2025-01-14 RX ADMIN — SERTRALINE HYDROCHLORIDE 75 MG: 50 TABLET ORAL at 20:43

## 2025-01-14 RX ADMIN — CHLORHEXIDINE GLUCONATE 15 ML: 1.2 RINSE ORAL at 07:49

## 2025-01-14 RX ADMIN — HEPARIN SODIUM 5000 UNITS: 5000 INJECTION, SOLUTION INTRAVENOUS; SUBCUTANEOUS at 22:08

## 2025-01-14 RX ADMIN — INSULIN ASPART 2 UNITS: 100 INJECTION, SOLUTION INTRAVENOUS; SUBCUTANEOUS at 16:34

## 2025-01-14 RX ADMIN — ATORVASTATIN CALCIUM 20 MG: 10 TABLET, FILM COATED ORAL at 20:45

## 2025-01-14 RX ADMIN — SEVELAMER CARBONATE 800 MG: 800 TABLET, FILM COATED ORAL at 11:59

## 2025-01-14 RX ADMIN — PROPOFOL 40 MCG/KG/MIN: 10 INJECTION, EMULSION INTRAVENOUS at 05:41

## 2025-01-14 RX ADMIN — PROPOFOL 40 MCG/KG/MIN: 10 INJECTION, EMULSION INTRAVENOUS at 01:42

## 2025-01-14 RX ADMIN — IPRATROPIUM BROMIDE AND ALBUTEROL SULFATE 3 ML: .5; 3 SOLUTION RESPIRATORY (INHALATION) at 18:21

## 2025-01-14 RX ADMIN — EPOETIN ALFA-EPBX 10000 UNITS: 10000 INJECTION, SOLUTION INTRAVENOUS; SUBCUTANEOUS at 21:10

## 2025-01-14 RX ADMIN — PROPOFOL 40 MCG/KG/MIN: 10 INJECTION, EMULSION INTRAVENOUS at 23:43

## 2025-01-14 RX ADMIN — PROPOFOL 30 MCG/KG/MIN: 10 INJECTION, EMULSION INTRAVENOUS at 18:36

## 2025-01-14 RX ADMIN — FUROSEMIDE 80 MG: 20 TABLET ORAL at 20:44

## 2025-01-14 RX ADMIN — SODIUM CHLORIDE 200 ML: 9 INJECTION, SOLUTION INTRAVENOUS at 21:12

## 2025-01-14 ASSESSMENT — ACTIVITIES OF DAILY LIVING (ADL)
ADLS_ACUITY_SCORE: 75
ADLS_ACUITY_SCORE: 75
ADLS_ACUITY_SCORE: 71
ADLS_ACUITY_SCORE: 75
ADLS_ACUITY_SCORE: 71
ADLS_ACUITY_SCORE: 71
ADLS_ACUITY_SCORE: 75
ADLS_ACUITY_SCORE: 71
ADLS_ACUITY_SCORE: 75
ADLS_ACUITY_SCORE: 71
ADLS_ACUITY_SCORE: 75
ADLS_ACUITY_SCORE: 71
ADLS_ACUITY_SCORE: 71
ADLS_ACUITY_SCORE: 75
ADLS_ACUITY_SCORE: 71
ADLS_ACUITY_SCORE: 75
ADLS_ACUITY_SCORE: 75

## 2025-01-14 NOTE — PROGRESS NOTES
ICU Progress Note   Date of Service: 01/13/25    Assessment and Plan:  Supriya Herr is a 74 year old female with a history of ESRD on HD, CHF, COPD, DM type II, hypertension who was admitted 1/8 with influenza A. This morning during dialysis, an RRT was called for hypoxia and she was intubated. The patient was admitted to the ICU for medical management.     Interval events:   Unable to obtain ROS due to intubation.    Neuro:  # Sedation for mechanical ventilation  # Likely baseline cognitive impairment  - Propofol for sedation, wean as able today  - PTA Zoloft, gabapentin    CV:  # Shock, septic vs polypharmacy (resolved)  # Hx of CHF  # Hx of HTN, HLD  - MAP goal > 65, not requiring pressors  - Hold home amlodipine, carvedilol, furosemide    Pulm:  # Acute hypoxemic respiratory failure  # Flu A  # Hx of COPD  # JONE  - low tidal volume ventilation  - BLE Doppler US negative 1/9  - Continue cefepime/vanc until after cultures result  - failed spontaneous breathing trial yesterday, repeat after iHD     GI:  - RD to manage TF    Renal:  # ESRD on HD   - Nephrology consult, appreciate recs   - HD planned today 1/13    ID:  # Influenza A pneumonia  # ? Community acquired pneumonia   - sputum culture no growth   - Oseltamivir started on admission, continue   - S/p 5 days of cefepime will discontinue     Heme:  #Chronic anemia   - Hgb baseline 9-10, currently 7.5   - Transfuse for Hgb < 7    Endo:  # Hx of DMII   - Hold PTA glargine for now, will restart if blood glucose poorly controlled   - SSI    PPx:  1. DVT: heparin  2. VAP: HOB 30 degrees, chlorhexidine rinse  3. Stress Ulcer: PPI  4. Restraints: Nonviolent soft two point restraints required and necessary for patient safety and continued cares and good effect as patient continues to pull at necessary lines, tubes despite education and distraction. Will readdress daily.   5. Wound care - per unit routine   6. Feeding - TF  7. Family update pending.    Dispo:  "ICU      /69   Pulse 65   Temp 98.2  F (36.8  C) (Oral)   Resp 11   Ht 1.702 m (5' 7\")   Wt 98.8 kg (217 lb 13 oz)   LMP  (LMP Unknown)   SpO2 94%   BMI 34.11 kg/m      FiO2 (%): 30 %, Resp: 11, Vent Mode: CMV/AC, Resp Rate (Set): 16 breaths/min, Tidal Volume (Set, mL): 480 mL, PEEP (cm H2O): 5 cmH2O, Pressure Support (cm H2O): 7 cmH2O, Resp Rate (Set): 16 breaths/min, Tidal Volume (Set, mL): 480 mL, PEEP (cm H2O): 5 cmH2O      Intake/Output Summary (Last 24 hours) at 1/13/2025 1849  Last data filed at 1/13/2025 1800  Gross per 24 hour   Intake 2393.37 ml   Output --   Net 2393.37 ml       Physical Exam:  Gen: Laying in bed in NAD  HEENT: NC AT  Resp: intubated, B/L air entry, no wheezing or rhonchi  CVS: S1 S2, regular rhythm on tele  Abd: Soft NT ND  Ext: no swelling noted, left BKA  Neuro: sedated    Labs: reviewed    Imaging: reviewed    Past Medical/Surgical history     Family history     Social history     Time Spent on this Encounter   Supriya was seen and evaluated by me on 01/13/25.  She was in critical condition as the result of acute hypoxic resp failure.    Her condition is now Serious.      The acute issues managed by me today include acute hypoxic respiratory failure, Flu, ESRD on HD, and DM   Supportive interventions provided and/or ordered by me include mechanical ventilation, HD, oseltamivir, and monitoring.    Total Critical Care time spent by me, excluding procedures, was 45 minutes.    Cheo Watt MD   "

## 2025-01-14 NOTE — CONSULTS
Clearing consult - ambulatory hand in     Placing Order 01/13/25 1010 Celeste Chandler, RN 01/13/25 1021

## 2025-01-14 NOTE — PLAN OF CARE
[facility] Intensive Care Unit   Nursing Note                                                       Neuro:  PERRL.  Moves all extremities.  Does not follow commands.  Cardiovascular:  RRR.  Hemodynamically stable without pressors.  Pulmonary:  Tolerating ventilator on propofol.  Oxygen saturation > 92  GI/:  Voided once overnight.  Endocrine: Glucose well controlled.  Skin:  Intact except incisional areas.  Protective mepilex applied to sacrum. Interdry to folds.  Restraints:  In use and necessary.  AM labs noted; electrolytes replaced as indicated.  Family updated  Continue to monitor closely.    Recent Labs   Lab 01/09/25  1540 01/08/25  1151   O2PER 30 5         ROUTINE IP LABS (Last four results)  BMP  Recent Labs   Lab 01/14/25  0451 01/14/25  0422 01/14/25  0145 01/13/25 2039 01/13/25  0557 01/13/25  0514 01/12/25  0845 01/12/25  0621 01/11/25  0827 01/11/25 0457   *  --   --   --   --  135  --  136  --  137   POTASSIUM 4.0  --   --   --   --  4.7  --  4.3  --  3.7   CHLORIDE 93*  --   --   --   --  94*  --  96*  --  100   JODY 9.5  --   --   --   --  9.7  --  9.2  --  8.8   CO2 27  --   --   --   --  27  --  28  --  28   BUN 31.0*  --   --   --   --  59.2*  --  44.5*  --  22.2   CR 3.23*  --   --   --   --  5.73*  --  4.78*  --  3.48*   * 130* 136* 129*   < > 169*   < > 183*   < > 135*    < > = values in this interval not displayed.     CBC  Recent Labs   Lab 01/14/25  0451 01/13/25  0514 01/12/25  0621 01/11/25 0457   WBC 5.0 4.6 3.7* 4.2   RBC 2.61* 2.58* 2.59* 2.59*   HGB 8.0* 7.8* 8.1* 8.1*   HCT 25.0* 24.3* 24.5* 24.2*   MCV 96 94 95 93   MCH 30.7 30.2 31.3 31.3   MCHC 32.0 32.1 33.1 33.5   RDW 14.4 14.4 14.4 14.1    165 135* 134*         Intake/Output Summary (Last 24 hours) at 1/14/2025 0628  Last data filed at 1/14/2025 0600  Gross per 24 hour   Intake 2436.26 ml   Output 1400 ml   Net 1036.26 ml       Santi Matos MSN RN PHN CCRN  [facility]  Hospital  Intensive Care Unit            Goal Outcome Evaluation:    Problem: Adult Inpatient Plan of Care  Goal: Plan of Care Review  Description: The Plan of Care Review/Shift note should be completed every shift.  The Outcome Evaluation is a brief statement about your assessment that the patient is improving, declining, or no change.  This information will be displayed automatically on your shift  note.  Outcome: Progressing     Problem: Adult Inpatient Plan of Care  Goal: Absence of Hospital-Acquired Illness or Injury  Outcome: Progressing     Problem: Adult Inpatient Plan of Care  Goal: Absence of Hospital-Acquired Illness or Injury  Intervention: Prevent Skin Injury  Recent Flowsheet Documentation  Taken 1/14/2025 0400 by Santi Matos RN  Body Position:   turned   heels elevated   side-lying 30 degrees  Skin Protection:   adhesive use limited   tubing/devices free from skin contact   silicone foam dressing in place   pulse oximeter probe site changed  Taken 1/14/2025 0200 by Santi Matos RN  Body Position:   turned   heels elevated   side-lying 30 degrees  Skin Protection:   adhesive use limited   tubing/devices free from skin contact   silicone foam dressing in place   pulse oximeter probe site changed  Taken 1/14/2025 0000 by Santi Matos RN  Body Position:   turned   heels elevated   side-lying 30 degrees  Skin Protection:   adhesive use limited   tubing/devices free from skin contact   silicone foam dressing in place   pulse oximeter probe site changed  Taken 1/13/2025 2200 by Santi Matos RN  Body Position:   turned   heels elevated   side-lying 30 degrees  Skin Protection:   adhesive use limited   tubing/devices free from skin contact   silicone foam dressing in place   pulse oximeter probe site changed  Taken 1/13/2025 2000 by Santi Matos RN  Body Position:   turned   heels elevated   side-lying 30 degrees  Skin Protection:   adhesive use limited   tubing/devices free from  skin contact   silicone foam dressing in place   pulse oximeter probe site changed     Problem: Adult Inpatient Plan of Care  Goal: Absence of Hospital-Acquired Illness or Injury  Intervention: Prevent and Manage VTE (Venous Thromboembolism) Risk  Recent Flowsheet Documentation  Taken 1/14/2025 0400 by Santi Matos RN  VTE Prevention/Management: (BP cuff on RLE) SCDs off (sequential compression devices)  Taken 1/14/2025 0200 by Santi Matos RN  VTE Prevention/Management: (BP cuff on RLE) SCDs off (sequential compression devices)  Taken 1/14/2025 0000 by Santi Matos RN  VTE Prevention/Management: (BP cuff on RLE) SCDs off (sequential compression devices)  Taken 1/13/2025 2200 by Santi Matos RN  VTE Prevention/Management: (BP cuff on RLE) SCDs off (sequential compression devices)  Taken 1/13/2025 2000 by Santi Matos RN  VTE Prevention/Management: (BP cuff on RLE) SCDs off (sequential compression devices)

## 2025-01-14 NOTE — PROGRESS NOTES
Pt was placed on SBT trial 5/5 and failed due to agitation and increase WOB. Pt was placed back on full MV support at 1435pm.

## 2025-01-14 NOTE — PROGRESS NOTES
[facility] ICU RESPIRATORY NOTE        Date of Admission: 1/8/2025    Date of Intubation (most recent): 1/9/2025    Reason for Mechanical Ventilation: Airway Protection    Number of Days on Mechanical Ventilation: 6    Met Criteria for Spontaneous Breathing Trial: No    Reason for No Spontaneous Breathing Trial: Per MD    Bite Block: No    Significant Events Today: None OVernight    ABG Results:   Recent Labs   Lab 01/09/25  1540 01/08/25  1151   O2PER 30 5         Current Vent Settings: FiO2 (%): 30 %, Resp: 16, Vent Mode: CMV/AC, Resp Rate (Set): 16 breaths/min, Tidal Volume (Set, mL): 480 mL, PEEP (cm H2O): 5 cmH2O, Resp Rate (Set): 16 breaths/min, Tidal Volume (Set, mL): 480 mL, PEEP (cm H2O): 5 cmH2O    Plan: Continue full vent support and assess for weaning    RT Deion on 1/14/2025 at 3:56 AM

## 2025-01-14 NOTE — PROGRESS NOTES
Hemodialysis Treatment Note:    Total UF removed:    1800 ml    Total Treatment Time:  3 hours     Access:   AV Fistula L upper arm: Thrill and bruit preesnt. No s/s of infections. Cannulated with 15g needles without complications. Access patent. Prescribed  achieved.     Run Summary:   K2 bath used. Originally 3.5hr treatment but later reduced to 3 hours per MD. Goal initially set for 2.4kg. BP soft at times with MAP dipping into lower 60s. Goal lowered as needed. Ended up pulling 1.8kg. Completed dialysis treatment without issues.  epoetin candy-epbx (RETACRIT) 5,000 Units given during dialysis. Report given to the primary nurse.      Access (post dialysis) :   AV Fistula continued to be patent.  maintained during dialysis. Needles pulled and gauze applied with direct pressure, then secured with tape.  Hemostasis achieved within 10 min.     Interventions:  VS check q15min     Plans:  Per renal team.    SHANTA Qiu Acute Dialysis ....................1/13/2025 8:06 PM

## 2025-01-14 NOTE — PROGRESS NOTES
Renal Medicine Progress Note            Assessment/Plan:     Supriya Herr is a 74 year old female with ESRD who was admitted on 1/8/2025.      1) Influenza A with hypoxic resp failure.   On oseltamivir at ESRD dose.   Mechanically ventilated     2) end-stage renal disease   Chronic dialysis at [facility], Tuesday Thursday Saturday   Primary nephrologist Dr. Basilio  Access: Right AV fistula,   Run time 3.5 hours     3) Anemia: HGB today 8.0.  She is on Mircera as outpt.  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  Vit D 2000 units daily.  Renvela 1600 mg TID c meals and 800 mg with snacks.      Plan:  1) no indications HD today  2) orders placed for tomorrow, more hypertensive will attempt increased UF goal  3) again 10,000's Retacrit with HD tomorrow      Aime Langley DO  [facility]  Office: [phone]  Cell: [phone]        Interval History:     S/p HD later in day yesterday. Ran 3 hrs, 1.8kg total UF, some lower BP's at end of run.   1/13: positive 961 on inputs/outputs. Sha 99.4kg today, increasing trend.     No events overnight, remains ventlated, sedated  BP's stable          Medications and Allergies:     Current Facility-Administered Medications   Medication Dose Route Frequency Provider Last Rate Last Admin    - MEDICATION INSTRUCTIONS for Dialysis Patients -   Does not apply See Admin Instructions Dusty Prieto MD        [Held by provider] amLODIPine (NORVASC) tablet 5 mg  5 mg Oral BID Marcell Grubbs PA-C   5 mg at 01/08/25 2217    atorvastatin (LIPITOR) tablet 20 mg  20 mg Oral or Feeding Tube QPM Kamryn Peguero MD   20 mg at 01/13/25 2030    B and C vitamin Complex with folic acid (NEPHRONEX) liquid 5 mL  5 mL Per Feeding Tube Daily Truman Moreland MD   5 mL at 01/14/25 0751    [Held by provider] carvedilol (COREG) tablet  25 mg  25 mg Oral BID w/meals Marcell Grubbs PA-C   25 mg at 01/08/25 1905    cetirizine (zyrTEC) tablet 5 mg  5 mg Oral or Feeding Tube At Bedtime Kamryn Peguero MD   5 mg at 01/13/25 2140    chlorhexidine (PERIDEX) 0.12 % solution 15 mL  15 mL Mouth/Throat Q12H Truman Moreland MD   15 mL at 01/14/25 0749    [Held by provider] furosemide (LASIX) tablet 40 mg  40 mg Oral Once per day on Tuesday Thursday Saturday Marcell Grubbs PA-C        And    [Held by provider] furosemide (LASIX) tablet 80 mg  80 mg Oral Once per day on Tuesday Thursday Saturday Marcell Grubbs PA-C        [Held by provider] furosemide (LASIX) tablet 80 mg  80 mg Oral 2 times per day on Sunday Monday Wednesday Friday Marcell Grubbs PA-C        gabapentin (NEURONTIN) solution 100 mg  100 mg Oral or Feeding Tube Daily Kamryn Peguero MD   100 mg at 01/14/25 0751    heparin ANTICOAGULANT injection 5,000 Units  5,000 Units Subcutaneous Q8H Marcell Grubbs PA-C   5,000 Units at 01/14/25 0616    insulin aspart (NovoLOG) injection (RAPID ACTING)  1-7 Units Subcutaneous Q4H John Renner MD   1 Units at 01/14/25 0814    [Held by provider] insulin glargine (LANTUS PEN) injection 12 Units  12 Units Subcutaneous At Bedtime Marcell Grubbs PA-C   12 Units at 01/08/25 2212    ipratropium - albuterol 0.5 mg/2.5 mg/3 mL (DUONEB) neb solution 3 mL  3 mL Nebulization 4x daily Marcell Grubbs PA-C   3 mL at 01/14/25 0723    oseltamivir (TAMIFLU) suspension 30 mg  30 mg Oral or Feeding Tube Q48H John Renner MD   30 mg at 01/13/25 2030    pantoprazole (PROTONIX) 2 mg/mL suspension 40 mg  40 mg Per Feeding Tube Truman De La Cruz MD   40 mg at 01/14/25 0749    Or    pantoprazole (PROTONIX) IV push injection 40 mg  40 mg Intravenous Truman De La Cruz MD        sertraline (ZOLOFT) tablet 75 mg  75 mg Oral or Feeding Tube QPM Kamryn Peguero MD   75 mg at 01/13/25 2030    sevelamer carbonate  "(RENVELA) tablet 800 mg  800 mg Oral or Feeding Tube TID w/meals Kamryn Peguero MD   800 mg at 01/14/25 0749    sodium chloride (PF) 0.9% PF flush 3 mL  3 mL Intracatheter Q8H Marcell Grubbs PA-C   3 mL at 01/13/25 2045    tacrolimus (PROTOPIC) 0.1 % ointment   Topical BID Marcell Grubbs PA-C   Given at 01/14/25 0750    vitamin D3 (CHOLECALCIFEROL) tablet 50 mcg  50 mcg Oral or Feeding Tube Daily Kamryn Peguero MD   50 mcg at 01/14/25 0750        Allergies   Allergen Reactions    Ampicillin-Sulbactam Sodium Rash     No evidence SJS, but very uncomfortable and precipitated multiple provider visits. Would not use penicillins again if other options available.     Penicillins Rash            Physical Exam:   Vitals were reviewed  /49   Pulse 64   Temp 98.5  F (36.9  C) (Axillary)   Resp 12   Ht 1.702 m (5' 7\")   Wt 99.4 kg (219 lb 2.2 oz)   LMP  (LMP Unknown)   SpO2 95%   BMI 34.32 kg/m      Wt Readings from Last 3 Encounters:   01/14/25 99.4 kg (219 lb 2.2 oz)   06/14/24 101.1 kg (222 lb 14.2 oz)   06/03/24 101.1 kg (222 lb 14.2 oz)       Intake/Output Summary (Last 24 hours) at 1/14/2025 0933  Last data filed at 1/14/2025 0800  Gross per 24 hour   Intake 2417.56 ml   Output 1400 ml   Net 1017.56 ml       GENERAL APPEARANCE: intubated, sedated  HEENT:  Eyes/ears/nose/neck grossly normal  RESP: + vent sounds  CV: RRR, nl S1/S2, no m/r/g   ABDOMEN: o/s/nt/nd, bs present  EXTREMITIES/SKIN: no c/c/rashes/lesions; 1+ edema  NEURO:  sedated           Data:     BMP  Recent Labs   Lab 01/14/25  0812 01/14/25  0451 01/14/25  0422 01/14/25  0145 01/13/25  0557 01/13/25  0514 01/12/25  0845 01/12/25  0621 01/11/25  0827 01/11/25  0457   NA  --  132*  --   --   --  135  --  136  --  137   POTASSIUM  --  4.0  --   --   --  4.7  --  4.3  --  3.7   CHLORIDE  --  93*  --   --   --  94*  --  96*  --  100   JODY  --  9.5  --   --   --  9.7  --  9.2  --  8.8   CO2  --  27  --   --   --  27  --  28  --  " 28   BUN  --  31.0*  --   --   --  59.2*  --  44.5*  --  22.2   CR  --  3.23*  --   --   --  5.73*  --  4.78*  --  3.48*   * 137* 130* 136*   < > 169*   < > 183*   < > 135*    < > = values in this interval not displayed.     CBC  Recent Labs   Lab 01/14/25  0451 01/13/25  0514 01/12/25  0621 01/11/25  0457   WBC 5.0 4.6 3.7* 4.2   HGB 8.0* 7.8* 8.1* 8.1*   HCT 25.0* 24.3* 24.5* 24.2*   MCV 96 94 95 93    165 135* 134*     Lab Results   Component Value Date    AST 26 01/08/2025    ALT 23 01/08/2025    ALKPHOS 72 01/08/2025    BILITOTAL 0.2 01/08/2025     Lab Results   Component Value Date    INR 1.11 04/24/2024       Attestation:  I have reviewed today's vital signs, notes, medications, labs and imaging.    DO [facility]  Office: [phone]  Cell: [phone]

## 2025-01-14 NOTE — PROGRESS NOTES
ICU Progress Note   Date of Service: 01/14/25    Assessment and Plan:  Supriya Herr is a 74 year old female with a history of ESRD on HD, CHF, COPD, DM type II, hypertension who was admitted 1/8 with influenza A. This morning during dialysis, an RRT was called for hypoxia and she was intubated. The patient was admitted to the ICU for medical management.      Interval events:   Had iHD last evening with 1.8L removal     Neuro:  # Sedation for mechanical ventilation  # Likely baseline cognitive impairment  - Propofol for sedation  - RASS goal of 0 to -1  - PTA Zoloft, gabapentin     CV:  # Shock, septic vs polypharmacy (resolved)  # Hx of CHF  # Hx of HTN, HLD  - MAP goal > 65, not requiring pressors  - Hold home amlodipine, carvedilol  - Resume home dose of furosemide 80 mg BID     Pulm:  # Acute hypoxemic respiratory failure  # Flu A  # COPD exacerbation  # JONE  - low tidal volume ventilation  - BLE Doppler US negative 1/9  - S/p 5 days of abx coverage  - failed spontaneous breathing trial today   - has active wheezing, will add methylpred steroids 40 q8 + nebs  - will also resume home regimen of diuresis    GI:  - RD to manage TF     Renal:  # ESRD on HD   - Nephrology consult, appreciate recs   - Had iHD 1/13, plan again on 1/15     ID:  # Influenza A pneumonia  # ? Community acquired pneumonia   - sputum culture no growth   - Oseltamivir started on admission, continue   - S/p 5 days of cefepime       Heme:  #Chronic anemia   - Hgb baseline 9-10, currently 7.5   - Transfuse for Hgb < 7     Endo:  # Hx of DMII   - Hold PTA glargine for now, will restart if blood glucose poorly controlled, especially with starting steroids   - SSI     PPx:  1. DVT: heparin  2. VAP: HOB 30 degrees, chlorhexidine rinse  3. Stress Ulcer: PPI  4. Restraints: Nonviolent soft two point restraints required and necessary for patient safety and continued cares and good effect as patient continues to pull at necessary lines, tubes despite  "education and distraction. Will readdress daily.   5. Wound care - per unit routine   6. Feeding - TF  7. Family updated at the bedside (sister)    Dispo: ICU      /47   Pulse 68   Temp 98.5  F (36.9  C) (Axillary)   Resp 16   Ht 1.702 m (5' 7\")   Wt 99.4 kg (219 lb 2.2 oz)   LMP  (LMP Unknown)   SpO2 94%   BMI 34.32 kg/m      FiO2 (%): 30 %, Resp: 16, Vent Mode: CMV/AC, Resp Rate (Set): 12 breaths/min, Tidal Volume (Set, mL): 480 mL, PEEP (cm H2O): 5 cmH2O, Resp Rate (Set): 12 breaths/min, Tidal Volume (Set, mL): 480 mL, PEEP (cm H2O): 5 cmH2O      Intake/Output Summary (Last 24 hours) at 1/14/2025 1209  Last data filed at 1/14/2025 0800  Gross per 24 hour   Intake 1876.74 ml   Output 1400 ml   Net 476.74 ml       Physical Exam:  Gen: Laying in bed in NAD  HEENT: NC AT  Resp: intubated, B/L air entry, + wheezing   CVS: S1 S2, regular rhythm on tele  Abd: Soft NT ND  Ext: no swelling noted, left BKA  Neuro: off sedation, opens her eyes spontaneously and moves extremities     Labs: reviewed    Imaging: reviewed    Past Medical/Surgical history     Family history     Social history     Time Spent on this Encounter   Supriya was seen and evaluated by me on 01/14/25.  She was in critical condition as the result of flu and hypoxic respiratory failure.    Her condition is now Serious.      The acute issues managed by me today include acute hypoxic respiratory failure, Flu, ESRD on HD, and DM   Supportive interventions provided and/or ordered by me include mechanical ventilation, HD, oseltamivir, and monitoring.     Total Critical Care time spent by me, excluding procedures, was 50 minutes.    Cheo Watt MD   "

## 2025-01-14 NOTE — PLAN OF CARE
Problem: Restraint, Nonviolent  Goal: Absence of Harm or Injury  Intervention: Protect Skin and Joint Integrity  Recent Flowsheet Documentation  Taken 1/14/2025 1400 by Marcella Barbour RN  Body Position: turned  Taken 1/14/2025 1200 by Marcella Barbour RN  Body Position: turned  Skin Protection: incontinence pads utilized  Range of Motion: ROM (range of motion) performed  Taken 1/14/2025 1000 by Marcella Barbour RN  Body Position: position maintained  Taken 1/14/2025 0800 by Marcella Barbour RN  Body Position:   turned   heels elevated  Skin Protection: incontinence pads utilized  Range of Motion: ROM (range of motion) performed     Problem: Risk for Delirium  Goal: Improved Behavioral Control  Intervention: Minimize Safety Risk  Recent Flowsheet Documentation  Taken 1/14/2025 1400 by Marcella Barbour RN  Trust Relationship/Rapport:   care explained   reassurance provided  Taken 1/14/2025 1200 by Marcella Barbour RN  Enhanced Safety Measures:   room near unit station   review medications for side effects with activity   patient/family teach back on injury risk  Trust Relationship/Rapport:   care explained   reassurance provided  Taken 1/14/2025 1000 by Marcella Barbour RN  Trust Relationship/Rapport:   care explained   reassurance provided  Taken 1/14/2025 0800 by Marcella Barbour RN  Enhanced Safety Measures:   room near unit station   review medications for side effects with activity   patient/family teach back on injury risk  Trust Relationship/Rapport:   care explained   reassurance provided   Goal Outcome Evaluation:      Plan of Care Reviewed With: patient, sibling    Overall Patient Progress: improvingOverall Patient Progress: improving     [id number]: Sedation vacation today and PS trial, tolerated for a couple hrs but resp rate increased and pt became restless/agitated. Prop gtt restarted and patient back on full vent support. Plan for HD tomorrow. Continue to wean sedation.

## 2025-01-14 NOTE — PLAN OF CARE
Care from [id number]    Activity: Ax2, T/R  Neuro:  remains sedated. Does not follow commands, restless at times  Cardiac: SR. BP remains at MAP goals, no pressors  Resp: Remains vented at FS. Large oral secretions  GI/: Small BM on shift  Diet: NPO. TF at GR w/ Q4/60mL flushes  Skin: pre-existing sacrum/coccyx wounds, redness around abdominal and breast folds, scattered bruising  LDAs: x2 R PIV, infusing. L HD fistula   Drips/Infusions: propofol and TKO   Pain: none  PRNs: none    Plan: HD 1/15    Goal Outcome Evaluation:      Plan of Care Reviewed With: patient, child    Overall Patient Progress: no changeOverall Patient Progress: no change    Outcome Evaluation: continues to be sedated and vented, plan for HD tmr 1/15

## 2025-01-15 LAB
ANION GAP SERPL CALCULATED.3IONS-SCNC: 13 MMOL/L (ref 7–15)
ATRIAL RATE - MUSE: 129 BPM
BUN SERPL-MCNC: 26.7 MG/DL (ref 8–23)
CALCIUM SERPL-MCNC: 9.9 MG/DL (ref 8.8–10.4)
CHLORIDE SERPL-SCNC: 92 MMOL/L (ref 98–107)
CREAT SERPL-MCNC: 2.27 MG/DL (ref 0.51–0.95)
DIASTOLIC BLOOD PRESSURE - MUSE: NORMAL MMHG
EGFRCR SERPLBLD CKD-EPI 2021: 22 ML/MIN/1.73M2
ERYTHROCYTE [DISTWIDTH] IN BLOOD BY AUTOMATED COUNT: 14.3 % (ref 10–15)
GLUCOSE BLDC GLUCOMTR-MCNC: 208 MG/DL (ref 70–99)
GLUCOSE BLDC GLUCOMTR-MCNC: 263 MG/DL (ref 70–99)
GLUCOSE BLDC GLUCOMTR-MCNC: 280 MG/DL (ref 70–99)
GLUCOSE BLDC GLUCOMTR-MCNC: 303 MG/DL (ref 70–99)
GLUCOSE BLDC GLUCOMTR-MCNC: 305 MG/DL (ref 70–99)
GLUCOSE BLDC GLUCOMTR-MCNC: 367 MG/DL (ref 70–99)
GLUCOSE SERPL-MCNC: 280 MG/DL (ref 70–99)
HCO3 SERPL-SCNC: 28 MMOL/L (ref 22–29)
HCT VFR BLD AUTO: 26.9 % (ref 35–47)
HGB BLD-MCNC: 8.6 G/DL (ref 11.7–15.7)
INTERPRETATION ECG - MUSE: NORMAL
MCH RBC QN AUTO: 30.7 PG (ref 26.5–33)
MCHC RBC AUTO-ENTMCNC: 32 G/DL (ref 31.5–36.5)
MCV RBC AUTO: 96 FL (ref 78–100)
P AXIS - MUSE: NORMAL DEGREES
PLATELET # BLD AUTO: 182 10E3/UL (ref 150–450)
POTASSIUM SERPL-SCNC: 4.7 MMOL/L (ref 3.4–5.3)
PR INTERVAL - MUSE: NORMAL MS
QRS DURATION - MUSE: 90 MS
QT - MUSE: 342 MS
QTC - MUSE: 520 MS
R AXIS - MUSE: 53 DEGREES
RBC # BLD AUTO: 2.8 10E6/UL (ref 3.8–5.2)
SODIUM SERPL-SCNC: 133 MMOL/L (ref 135–145)
SYSTOLIC BLOOD PRESSURE - MUSE: NORMAL MMHG
T AXIS - MUSE: 262 DEGREES
TROPONIN T SERPL HS-MCNC: 45 NG/L
TROPONIN T SERPL HS-MCNC: 46 NG/L
VENTRICULAR RATE- MUSE: 139 BPM
WBC # BLD AUTO: 6.2 10E3/UL (ref 4–11)

## 2025-01-15 PROCEDURE — 93005 ELECTROCARDIOGRAM TRACING: CPT

## 2025-01-15 PROCEDURE — 94003 VENT MGMT INPAT SUBQ DAY: CPT

## 2025-01-15 PROCEDURE — 250N000011 HC RX IP 250 OP 636: Performed by: STUDENT IN AN ORGANIZED HEALTH CARE EDUCATION/TRAINING PROGRAM

## 2025-01-15 PROCEDURE — 250N000009 HC RX 250: Performed by: INTERNAL MEDICINE

## 2025-01-15 PROCEDURE — 93010 ELECTROCARDIOGRAM REPORT: CPT | Performed by: INTERNAL MEDICINE

## 2025-01-15 PROCEDURE — 250N000009 HC RX 250: Performed by: STUDENT IN AN ORGANIZED HEALTH CARE EDUCATION/TRAINING PROGRAM

## 2025-01-15 PROCEDURE — G0463 HOSPITAL OUTPT CLINIC VISIT: HCPCS

## 2025-01-15 PROCEDURE — 82310 ASSAY OF CALCIUM: CPT | Performed by: STUDENT IN AN ORGANIZED HEALTH CARE EDUCATION/TRAINING PROGRAM

## 2025-01-15 PROCEDURE — 94640 AIRWAY INHALATION TREATMENT: CPT | Mod: 76

## 2025-01-15 PROCEDURE — 250N000011 HC RX IP 250 OP 636: Performed by: INTERNAL MEDICINE

## 2025-01-15 PROCEDURE — 250N000011 HC RX IP 250 OP 636: Performed by: NURSE PRACTITIONER

## 2025-01-15 PROCEDURE — 999N000157 HC STATISTIC RCP TIME EA 10 MIN

## 2025-01-15 PROCEDURE — 250N000013 HC RX MED GY IP 250 OP 250 PS 637: Performed by: INTERNAL MEDICINE

## 2025-01-15 PROCEDURE — 999N000287 HC ICU ADULT ROUNDING, EACH 10 MINS

## 2025-01-15 PROCEDURE — 36415 COLL VENOUS BLD VENIPUNCTURE: CPT | Performed by: STUDENT IN AN ORGANIZED HEALTH CARE EDUCATION/TRAINING PROGRAM

## 2025-01-15 PROCEDURE — 99232 SBSQ HOSP IP/OBS MODERATE 35: CPT | Performed by: INTERNAL MEDICINE

## 2025-01-15 PROCEDURE — 84484 ASSAY OF TROPONIN QUANT: CPT | Performed by: INTERNAL MEDICINE

## 2025-01-15 PROCEDURE — 85018 HEMOGLOBIN: CPT | Performed by: STUDENT IN AN ORGANIZED HEALTH CARE EDUCATION/TRAINING PROGRAM

## 2025-01-15 PROCEDURE — 94640 AIRWAY INHALATION TREATMENT: CPT

## 2025-01-15 PROCEDURE — 999N000009 HC STATISTIC AIRWAY CARE

## 2025-01-15 PROCEDURE — 80048 BASIC METABOLIC PNL TOTAL CA: CPT | Performed by: STUDENT IN AN ORGANIZED HEALTH CARE EDUCATION/TRAINING PROGRAM

## 2025-01-15 PROCEDURE — 36415 COLL VENOUS BLD VENIPUNCTURE: CPT | Performed by: INTERNAL MEDICINE

## 2025-01-15 PROCEDURE — 99291 CRITICAL CARE FIRST HOUR: CPT | Performed by: INTERNAL MEDICINE

## 2025-01-15 PROCEDURE — 250N000013 HC RX MED GY IP 250 OP 250 PS 637: Performed by: STUDENT IN AN ORGANIZED HEALTH CARE EDUCATION/TRAINING PROGRAM

## 2025-01-15 PROCEDURE — 200N000001 HC R&B ICU

## 2025-01-15 RX ORDER — DEXMEDETOMIDINE HYDROCHLORIDE 4 UG/ML
.1-1.2 INJECTION, SOLUTION INTRAVENOUS CONTINUOUS
Status: DISCONTINUED | OUTPATIENT
Start: 2025-01-15 | End: 2025-01-19

## 2025-01-15 RX ORDER — FLUTICASONE PROPIONATE 110 UG/1
1 AEROSOL, METERED RESPIRATORY (INHALATION)
Status: DISCONTINUED | OUTPATIENT
Start: 2025-01-15 | End: 2025-01-16

## 2025-01-15 RX ADMIN — AMIODARONE HYDROCHLORIDE 1 MG/MIN: 1.8 INJECTION, SOLUTION INTRAVENOUS at 02:22

## 2025-01-15 RX ADMIN — INSULIN ASPART 4 UNITS: 100 INJECTION, SOLUTION INTRAVENOUS; SUBCUTANEOUS at 15:23

## 2025-01-15 RX ADMIN — INSULIN ASPART 2 UNITS: 100 INJECTION, SOLUTION INTRAVENOUS; SUBCUTANEOUS at 00:40

## 2025-01-15 RX ADMIN — AMIODARONE HYDROCHLORIDE 0.5 MG/MIN: 1.8 INJECTION, SOLUTION INTRAVENOUS at 19:20

## 2025-01-15 RX ADMIN — SEVELAMER CARBONATE 800 MG: 800 TABLET, FILM COATED ORAL at 17:45

## 2025-01-15 RX ADMIN — OSELTAMIVIR PHOSPHATE 30 MG: 6 FOR SUSPENSION ORAL at 14:15

## 2025-01-15 RX ADMIN — IPRATROPIUM BROMIDE AND ALBUTEROL SULFATE 3 ML: .5; 3 SOLUTION RESPIRATORY (INHALATION) at 07:16

## 2025-01-15 RX ADMIN — SERTRALINE HYDROCHLORIDE 75 MG: 50 TABLET ORAL at 20:11

## 2025-01-15 RX ADMIN — FUROSEMIDE 80 MG: 20 TABLET ORAL at 08:13

## 2025-01-15 RX ADMIN — METHYLPREDNISOLONE SODIUM SUCCINATE 40 MG: 40 INJECTION, POWDER, FOR SOLUTION INTRAMUSCULAR; INTRAVENOUS at 04:57

## 2025-01-15 RX ADMIN — CHLORHEXIDINE GLUCONATE 15 ML: 1.2 RINSE ORAL at 20:12

## 2025-01-15 RX ADMIN — TACROLIMUS: 1 OINTMENT TOPICAL at 20:12

## 2025-01-15 RX ADMIN — INSULIN ASPART 5 UNITS: 100 INJECTION, SOLUTION INTRAVENOUS; SUBCUTANEOUS at 20:20

## 2025-01-15 RX ADMIN — IPRATROPIUM BROMIDE AND ALBUTEROL SULFATE 3 ML: .5; 3 SOLUTION RESPIRATORY (INHALATION) at 14:28

## 2025-01-15 RX ADMIN — GABAPENTIN 100 MG: 250 SOLUTION ORAL at 08:14

## 2025-01-15 RX ADMIN — IPRATROPIUM BROMIDE AND ALBUTEROL SULFATE 3 ML: .5; 3 SOLUTION RESPIRATORY (INHALATION) at 10:30

## 2025-01-15 RX ADMIN — HEPARIN SODIUM 5000 UNITS: 5000 INJECTION, SOLUTION INTRAVENOUS; SUBCUTANEOUS at 05:46

## 2025-01-15 RX ADMIN — Medication 5 ML: at 08:13

## 2025-01-15 RX ADMIN — SEVELAMER CARBONATE 800 MG: 800 TABLET, FILM COATED ORAL at 11:53

## 2025-01-15 RX ADMIN — PROPOFOL 45 MCG/KG/MIN: 10 INJECTION, EMULSION INTRAVENOUS at 05:48

## 2025-01-15 RX ADMIN — PROPOFOL 40 MCG/KG/MIN: 10 INJECTION, EMULSION INTRAVENOUS at 03:19

## 2025-01-15 RX ADMIN — INSULIN ASPART 4 UNITS: 100 INJECTION, SOLUTION INTRAVENOUS; SUBCUTANEOUS at 11:51

## 2025-01-15 RX ADMIN — DEXMEDETOMIDINE HYDROCHLORIDE 0.2 MCG/KG/HR: 400 INJECTION INTRAVENOUS at 11:41

## 2025-01-15 RX ADMIN — ATORVASTATIN CALCIUM 20 MG: 10 TABLET, FILM COATED ORAL at 20:11

## 2025-01-15 RX ADMIN — Medication 50 MCG: at 08:13

## 2025-01-15 RX ADMIN — HEPARIN SODIUM 5000 UNITS: 5000 INJECTION, SOLUTION INTRAVENOUS; SUBCUTANEOUS at 22:03

## 2025-01-15 RX ADMIN — INSULIN ASPART 4 UNITS: 100 INJECTION, SOLUTION INTRAVENOUS; SUBCUTANEOUS at 08:14

## 2025-01-15 RX ADMIN — IPRATROPIUM BROMIDE AND ALBUTEROL SULFATE 3 ML: .5; 3 SOLUTION RESPIRATORY (INHALATION) at 20:09

## 2025-01-15 RX ADMIN — FUROSEMIDE 80 MG: 20 TABLET ORAL at 15:23

## 2025-01-15 RX ADMIN — PROPOFOL 45 MCG/KG/MIN: 10 INJECTION, EMULSION INTRAVENOUS at 09:28

## 2025-01-15 RX ADMIN — METHYLPREDNISOLONE SODIUM SUCCINATE 40 MG: 40 INJECTION, POWDER, FOR SOLUTION INTRAMUSCULAR; INTRAVENOUS at 20:12

## 2025-01-15 RX ADMIN — CETIRIZINE HYDROCHLORIDE 5 MG: 5 TABLET ORAL at 22:03

## 2025-01-15 RX ADMIN — AMIODARONE HYDROCHLORIDE 0.5 MG/MIN: 1.8 INJECTION, SOLUTION INTRAVENOUS at 08:24

## 2025-01-15 RX ADMIN — Medication 40 MG: at 08:13

## 2025-01-15 RX ADMIN — SEVELAMER CARBONATE 800 MG: 800 TABLET, FILM COATED ORAL at 08:13

## 2025-01-15 RX ADMIN — CHLORHEXIDINE GLUCONATE 15 ML: 1.2 RINSE ORAL at 08:04

## 2025-01-15 RX ADMIN — INSULIN ASPART 3 UNITS: 100 INJECTION, SOLUTION INTRAVENOUS; SUBCUTANEOUS at 05:03

## 2025-01-15 RX ADMIN — TACROLIMUS: 1 OINTMENT TOPICAL at 08:13

## 2025-01-15 RX ADMIN — HEPARIN SODIUM 5000 UNITS: 5000 INJECTION, SOLUTION INTRAVENOUS; SUBCUTANEOUS at 13:36

## 2025-01-15 RX ADMIN — METHYLPREDNISOLONE SODIUM SUCCINATE 40 MG: 40 INJECTION, POWDER, FOR SOLUTION INTRAMUSCULAR; INTRAVENOUS at 11:53

## 2025-01-15 RX ADMIN — AMIODARONE HYDROCHLORIDE 150 MG: 1.5 INJECTION, SOLUTION INTRAVENOUS at 02:11

## 2025-01-15 ASSESSMENT — ACTIVITIES OF DAILY LIVING (ADL)
ADLS_ACUITY_SCORE: 75
ADLS_ACUITY_SCORE: 71
ADLS_ACUITY_SCORE: 71
ADLS_ACUITY_SCORE: 75
ADLS_ACUITY_SCORE: 71
ADLS_ACUITY_SCORE: 75
ADLS_ACUITY_SCORE: 71
ADLS_ACUITY_SCORE: 75
ADLS_ACUITY_SCORE: 71
ADLS_ACUITY_SCORE: 71
ADLS_ACUITY_SCORE: 75
ADLS_ACUITY_SCORE: 73

## 2025-01-15 NOTE — PROGRESS NOTES
Renal Medicine Progress Note            Assessment/Plan:     Supriya Herr is a 74 year old female with ESRD who was admitted on 1/8/2025.      1) Influenza A with hypoxic resp failure.   On oseltamivir at ESRD dose.   Mechanically ventilated     2) end-stage renal disease   Chronic dialysis at [facility], Tuesday Thursday Saturday   Primary nephrologist Dr. Basilio  Access: Right AV fistula,   Run time 3.5 hours     3) Anemia: HGB today 8.6.  She is on Mircera as outpt.  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  Vit D 2000 units daily.  Renvela 1600 mg TID c meals and 800 mg with snacks.       Plan:  1) no dialysis today, today's planned morning session performed last evening  2) next dialysis tomorrow or 1/17, will assess the morning       Aime Langley DO  [facility]  Office: [phone]  Cell: [phone]        Interval History:     Patient's dialysis schedule adjusted, ran last evening instead of this morning.  Ran 3 hours, only 0.5 L removed.  Session complicated with tachycardia, atrial fibrillation.  A-fib persisted, started on amiodarone.    Weight unchanged today, 99.4 kg  Hypertensive this morning.  Noted vent settings at 30% FiO2  Patient's PTA furosemide resumed yesterday.  No acute output recorded          Medications and Allergies:     Current Facility-Administered Medications   Medication Dose Route Frequency Provider Last Rate Last Admin    - MEDICATION INSTRUCTIONS for Dialysis Patients -   Does not apply See Admin Instructions Dusty Prieto MD        [Held by provider] amLODIPine (NORVASC) tablet 5 mg  5 mg Oral BID Marcell Grubbs PA-C   5 mg at 01/08/25 2217    atorvastatin (LIPITOR) tablet 20 mg  20 mg Oral or Feeding Tube QPM Kamryn Peguero MD   20 mg at 01/14/25 2045    B and C vitamin Complex with folic acid (NEPHRONEX) liquid 5 mL  5 mL  Per Feeding Tube Daily Truman Moreland MD   5 mL at 01/15/25 0813    [Held by provider] carvedilol (COREG) tablet 25 mg  25 mg Oral BID w/meals Marcell Grubbs PA-C   25 mg at 01/08/25 1905    cetirizine (zyrTEC) tablet 5 mg  5 mg Oral or Feeding Tube At Bedtime Kamryn Peguero MD   5 mg at 01/14/25 2208    chlorhexidine (PERIDEX) 0.12 % solution 15 mL  15 mL Mouth/Throat Q12H Truman Moreland MD   15 mL at 01/15/25 0804    [Held by provider] furosemide (LASIX) tablet 40 mg  40 mg Oral Once per day on Tuesday Thursday Saturday Marcell Grubbs PA-C        And    [Held by provider] furosemide (LASIX) tablet 80 mg  80 mg Oral Once per day on Tuesday Thursday Saturday Marcell Grubbs PA-C        furosemide (LASIX) tablet 80 mg  80 mg Oral or NG Tube BID Cheo Watt MD   80 mg at 01/15/25 0813    gabapentin (NEURONTIN) solution 100 mg  100 mg Oral or Feeding Tube Daily Kamryn Peguero MD   100 mg at 01/15/25 0814    heparin ANTICOAGULANT injection 5,000 Units  5,000 Units Subcutaneous Q8H Marcell Grubbs PA-C   5,000 Units at 01/15/25 0546    insulin aspart (NovoLOG) injection (RAPID ACTING)  1-7 Units Subcutaneous Q4H John Renenr MD   4 Units at 01/15/25 0814    insulin glargine (LANTUS PEN) injection 12 Units  12 Units Subcutaneous Daily Cheo Watt MD        ipratropium - albuterol 0.5 mg/2.5 mg/3 mL (DUONEB) neb solution 3 mL  3 mL Nebulization 4x daily Marcell Grubbs PA-C   3 mL at 01/15/25 1030    methylPREDNISolone sodium succinate (SOLU-MEDROL) injection 40 mg  40 mg Intravenous Q8H Cheo Watt MD   40 mg at 01/15/25 0457    oseltamivir (TAMIFLU) suspension 30 mg  30 mg Oral or Feeding Tube Q48H John Renner MD   30 mg at 01/13/25 2030    pantoprazole (PROTONIX) 2 mg/mL suspension 40 mg  40 mg Per Feeding Tube Truman De La Cruz MD   40 mg at 01/15/25 0813    Or    pantoprazole (PROTONIX) IV push injection 40 mg  40 mg Intravenous SUSHANT Moreland  "MD Truman        sertraline (ZOLOFT) tablet 75 mg  75 mg Oral or Feeding Tube QPM Kamryn Peguero MD   75 mg at 01/14/25 2043    sevelamer carbonate (RENVELA) tablet 800 mg  800 mg Oral or Feeding Tube TID w/meals Kamryn Peguero MD   800 mg at 01/15/25 0813    sodium chloride (PF) 0.9% PF flush 3 mL  3 mL Intracatheter Q8H Marcell Grubbs PA-C   3 mL at 01/14/25 2048    tacrolimus (PROTOPIC) 0.1 % ointment   Topical BID Marcell Grubbs PA-C   Given at 01/15/25 0813    vitamin D3 (CHOLECALCIFEROL) tablet 50 mcg  50 mcg Oral or Feeding Tube Daily Kamryn Peguero MD   50 mcg at 01/15/25 0813        Allergies   Allergen Reactions    Ampicillin-Sulbactam Sodium Rash     No evidence SJS, but very uncomfortable and precipitated multiple provider visits. Would not use penicillins again if other options available.     Penicillins Rash            Physical Exam:   Vitals were reviewed  BP (!) 143/89 (BP Location: Right leg)   Pulse 117   Temp 98  F (36.7  C) (Axillary)   Resp 12   Ht 1.702 m (5' 7\")   Wt 99.4 kg (219 lb 2.2 oz)   LMP  (LMP Unknown)   SpO2 98%   BMI 34.32 kg/m      Wt Readings from Last 3 Encounters:   01/15/25 99.4 kg (219 lb 2.2 oz)   06/14/24 101.1 kg (222 lb 14.2 oz)   06/03/24 101.1 kg (222 lb 14.2 oz)       Intake/Output Summary (Last 24 hours) at 1/15/2025 1134  Last data filed at 1/15/2025 1000  Gross per 24 hour   Intake 2531.08 ml   Output 500 ml   Net 2031.08 ml       GENERAL APPEARANCE: intubated, sedated  HEENT:  Eyes/ears/nose/neck grossly normal  RESP: + vent sounds  CV: RRR, nl S1/S2, no m/r/g   ABDOMEN: o/s/nt/nd, bs present  EXTREMITIES/SKIN: no c/c/rashes/lesions; trace dependent edema  NEURO:  sedated           Data:     BMP  Recent Labs   Lab 01/15/25  0802 01/15/25  0507 01/15/25  0502 01/15/25  0039 01/14/25  0812 01/14/25  0451 01/13/25  0557 01/13/25  0514 01/12/25  0845 01/12/25  0621   NA  --  133*  --   --   --  132*  --  135  --  136   POTASSIUM  " --  4.7  --   --   --  4.0  --  4.7  --  4.3   CHLORIDE  --  92*  --   --   --  93*  --  94*  --  96*   JODY  --  9.9  --   --   --  9.5  --  9.7  --  9.2   CO2  --  28  --   --   --  27  --  27  --  28   BUN  --  26.7*  --   --   --  31.0*  --  59.2*  --  44.5*   CR  --  2.27*  --   --   --  3.23*  --  5.73*  --  4.78*   * 280* 263* 208*   < > 137*   < > 169*   < > 183*    < > = values in this interval not displayed.     CBC  Recent Labs   Lab 01/15/25  0507 01/14/25  0451 01/13/25  0514 01/12/25  0621   WBC 6.2 5.0 4.6 3.7*   HGB 8.6* 8.0* 7.8* 8.1*   HCT 26.9* 25.0* 24.3* 24.5*   MCV 96 96 94 95    188 165 135*     Lab Results   Component Value Date    AST 26 01/08/2025    ALT 23 01/08/2025    ALKPHOS 72 01/08/2025    BILITOTAL 0.2 01/08/2025     Lab Results   Component Value Date    INR 1.11 04/24/2024       Attestation:  I have reviewed today's vital signs, notes, medications, labs and imaging.    DO [facility]  Office: [phone]  Cell: [phone]

## 2025-01-15 NOTE — PLAN OF CARE
[facility] Intensive Care Unit   Nursing Note                                                       Neuro:  PERRL.  Moves all extremities. Does not follow commands. Sedated for vent tolerance.  Cardiovascular:  IRRR.  Into A-fib overnight to 150's during HD run. Now on Amiodarone, rate controlled <100. Hemodynamically stable.  Pulmonary:  Tolerating ventilator on propofol. Oxygen saturation > 92  GI/:  Incontinent of urine once overnight. Smear of stool.  Endocrine: Glucose well controlled.  Skin:  Intact except incisional areas.  Protective mepilex applied to sacrum.  Restraints:  In use and necessary.  AM labs noted; electrolytes replaced as indicated.  Family updated  Continue to monitor closely.    Recent Labs   Lab 01/09/25  1540 01/08/25  1151   O2PER 30 5         ROUTINE IP LABS (Last four results)  BMP  Recent Labs   Lab 01/15/25  0507 01/15/25  0502 01/15/25  0039 01/14/25 2055 01/14/25  0812 01/14/25  0451 01/13/25  0557 01/13/25  0514 01/12/25  0845 01/12/25  0621   *  --   --   --   --  132*  --  135  --  136   POTASSIUM 4.7  --   --   --   --  4.0  --  4.7  --  4.3   CHLORIDE 92*  --   --   --   --  93*  --  94*  --  96*   JODY 9.9  --   --   --   --  9.5  --  9.7  --  9.2   CO2 28  --   --   --   --  27  --  27  --  28   BUN 26.7*  --   --   --   --  31.0*  --  59.2*  --  44.5*   CR 2.27*  --   --   --   --  3.23*  --  5.73*  --  4.78*   * 263* 208* 214*   < > 137*   < > 169*   < > 183*    < > = values in this interval not displayed.     CBC  Recent Labs   Lab 01/15/25  0507 01/14/25  0451 01/13/25  0514 01/12/25  0621   WBC 6.2 5.0 4.6 3.7*   RBC 2.80* 2.61* 2.58* 2.59*   HGB 8.6* 8.0* 7.8* 8.1*   HCT 26.9* 25.0* 24.3* 24.5*   MCV 96 96 94 95   MCH 30.7 30.7 30.2 31.3   MCHC 32.0 32.0 32.1 33.1   RDW 14.3 14.4 14.4 14.4    188 165 135*           Intake/Output Summary (Last 24 hours) at 1/15/2025 0634  Last data filed at 1/15/2025 0600  Gross per 24 hour   Intake 2355.78  ml   Output 500 ml   Net 1855.78 ml       Santi Matos MSN RN PHN CCRN  [facility]  Intensive Care Unit        Problem: Adult Inpatient Plan of Care  Goal: Plan of Care Review  Description: The Plan of Care Review/Shift note should be completed every shift.  The Outcome Evaluation is a brief statement about your assessment that the patient is improving, declining, or no change.  This information will be displayed automatically on your shift  note.  Outcome: Progressing  Flowsheets (Taken 1/15/2025 1832)  Overall Patient Progress: no change     Problem: Gas Exchange Impaired  Goal: Optimal Gas Exchange  Outcome: Progressing     Problem: Risk for Delirium  Goal: Improved Sleep  Outcome: Progressing   Goal Outcome Evaluation:        Overall Patient Progress: no changeOverall Patient Progress: no change

## 2025-01-15 NOTE — PROGRESS NOTES
[facility] ICU RESPIRATORY NOTE        Date of Admission: 1/8/2025    Date of Intubation (most recent): 01/09/2024    Reason for Mechanical Ventilation: Airway protection.    Number of Days on Mechanical Ventilation: 7     Met Criteria for Spontaneous Breathing Trial: No    Reason for No Spontaneous Breathing Trial: per MD.    Bite Block: No    Significant Events Today: None.    ABG Results:   Recent Labs   Lab 01/09/25  1540 01/08/25  1151   O2PER 30 5         Current Vent Settings: FiO2 (%): 30 %, Resp: 13, Vent Mode: CMV/AC, Resp Rate (Set): 12 breaths/min, Tidal Volume (Set, mL): 480 mL, PEEP (cm H2O): 5 cmH2O, Pressure Support (cm H2O): 5 cmH2O, Resp Rate (Set): 12 breaths/min, Tidal Volume (Set, mL): 480 mL, PEEP (cm H2O): 5 cmH2O    Skin Assessment: clean and intact.    Plan: Assess for weaning readiness.    Margret San, RT on 1/15/2025 at 3:46 AM

## 2025-01-15 NOTE — PROGRESS NOTES
Brief intensivist note  Patient has developed atrial flutter/atrial fibrillation. EKG reviewed and troponin negative     We will initiated amiodarone at this time as his HR seems persistent above 120's.    Blanquita mckeon  January 15, 2025

## 2025-01-15 NOTE — PROGRESS NOTES
Potassium   Date Value Ref Range Status   01/14/2025 4.0 3.4 - 5.3 mmol/L Final   02/02/2022 4.7 3.4 - 5.3 mmol/L Final   04/17/2021 4.2 3.4 - 5.3 mmol/L Final     Hemoglobin   Date Value Ref Range Status   01/14/2025 8.0 (L) 11.7 - 15.7 g/dL Final   04/16/2021 10.9 (L) 11.7 - 15.7 g/dL Final     Creatinine   Date Value Ref Range Status   01/14/2025 3.23 (H) 0.51 - 0.95 mg/dL Final   04/17/2021 5.98 (H) 0.52 - 1.04 mg/dL Final     Urea Nitrogen   Date Value Ref Range Status   01/14/2025 31.0 (H) 8.0 - 23.0 mg/dL Final   11/24/2021 37 (H) 7 - 30 mg/dL Final   04/17/2021 68 (H) 7 - 30 mg/dL Final     Sodium   Date Value Ref Range Status   01/14/2025 132 (L) 135 - 145 mmol/L Final   04/17/2021 137 133 - 144 mmol/L Final     INR   Date Value Ref Range Status   04/24/2024 1.11 0.85 - 1.15 Final   04/16/2021 1.14 0.86 - 1.14 Final       DIALYSIS PROCEDURE NOTE  Hepatitis status of previous patient on machine log was checked and verified ok to use with this patients hepatitis status.  Patient dialyzed for 3 hrs. on a K3 bath with a net fluid removal of  0.5L.  A BFR of 375 ml/min was obtained via a LUE AVF using 15 gauge needles.      The treatment plan was discussed with Dr. Langley and Dr. Doe during the treatment.    Total heparin received during the treatment: 0 units.   Needle cannulation sites held x 7 min.     Meds given: Retacrit   Complications: Pt started to become tachycardic about 15 min into run and then had a short run of A-fib. Decreased goal and BFR at that time. Pt continued to have arrhythmias with HR fluctuating between 90's-120's but a-fib appeared to resolve. BP started to decline 30 min into run with a steady decline before dipping down into the 80's 1 hr into tx. UF off at that time and 150 mL NS bolus was administered. Notified Dr. Doe who ordered to stop pulling fluid for remainder of run and administer Albumin bolus.     Person educated: Pt. Knowledge base CALLIE d/t sedation. Barriers to learning:  sedation/intubation. Educated on procedure via verbal mode. The patient is unable to verbalize understanding.   ICEBOAT? Timeout performed pre-treatment  I: Patient was identified using 2 identifiers  C:  Consent Signed Yes  E: Equipment preventative maintenance is current and dialysis delivery system OK to use  B: Hepatitis B Surface Antigen: Non-reactive; Draw Date: 1/8/25      Hepatitis B Surface Antibody: Susceptible; Draw Date: 1/9/25  O: Dialysis orders present and complete prior to treatment  A: Vascular access verified and assessed prior to treatment  T: Treatment was performed at a clinically appropriate time  ?: Patient was allowed to ask questions and address concerns prior to treatment  See Adult Hemodialysis flowsheet in [facility] for further details and post assessment.  Machine water alarm in place and functioning. Transducer pods intact and checked every 15min.   Pt assisted with repositioning throughout dialysis treatment.  Pt dialyzed at bedside in ICU.  Chlorine/Chloramine water system checked every 4 hours.  [facility]      Post treatment report given to bedside RN (see flowsheet) regarding 0.5L of fluid removed, last /61.      Please remove patient dressing on AVF and AVG needle sites 24 hours after dialysis. If leaking occurs please apply a Band-Aid.     Yen [facility] D[facility] RN

## 2025-01-15 NOTE — PROVIDER NOTIFICATION
MD Notification    Notified Person: MD    Notified Person Name: Dr. Watt    Notification Date/Time: 1/15/2025    Notification Interaction: Face-to-face    1330: MD notfied that patient has bilateral nystagmus. Propofol stopped at 1310 per MD request.    Comments: MD came to bedside to assess patient

## 2025-01-15 NOTE — PROGRESS NOTES
"[facility]  WO Nurse Inpatient Assessment     Consulted for: \"coccyx\"    Summary: POA area of dry scaly skin with superficial ulcerations to left buttock. POA scar tissue and blanchable redness noted to bilateral buttocks with dark discoloration to sacral area when patient was laying on her right side. Incidental finding of large flat scar tissue to perineal area and medial/posterior thighs.  1/15: stable plan of care, wound improved, WOC to sign off at this time. Please re-consult for new skin integrity concerns    Patient History (according to provider note(s):      Supriya Herr is a 74 year old female with PMHx significant for ESRD on HD, CHF, COPD, DM type II, hypertension.  She presented to the ER via ambulance complaining of shortness of breath.  Daughter currently ill with influenza.  Patient tested positive for influenza in the ER.  She is admitted for further care management.     Assessment:      Areas visualized during today's visit: Focused:       Left buttocks and sacral area       Perineal area & bilateral thighs    Last photo: 1/15/25    Wound history/plan of care: Patient states that left buttocks wound is d/t her sliding across her wheelchair. She does report having a wheelchair cushion that she normally uses at home, but states that it needs to be reinflated. Will order a Koko Chair cushion. Patient has been using Vaseline on her buttocks wound and states that this helps her to not have pain in this area at night. Because this area is so dry, we will use Xeroform and then cover it with a Sacral Mepilex border to protect the surrounding skin. Patient has difficulty laying on her side, although she is aware that she needs to be repositioning at least every 2 hours. She is agreeable to repositioning using pillows for pressure relief.     Patient also states that she slid down out of her wheelchair about a month ago, and she thinks the discoloration to her sacral area could " possibly be due to this.This discoloration was only visible in the first picture taken by [facility] today. Patient's ED nurse assisted with assessment of this area after that picture was taken, and we were unable to visualize this discoloration and it does not appear in subsequent pictures of the same area. There is a lighter pale purplish discoloration to the gluteal crease, but no other dark discoloration was noted.    Patient does have old scar tissue to her perineal area and bilateral thighs. This tissue is pale, smooth, and intact. Patient thinks that these scars were also caused by her sliding around in her wheelchair.     1/15: areas stable, chronic skin discoloration    Wound locations: Left buttock  Wound due to: Friction  Wound base: 80% Epidermis, Scar tissue, and Bumpy , 20% Dry and Pink     Palpation of the wound bed: normal      Drainage: none     Description of drainage: none     Measurements (length x width x depth, in cm): 4 x 1.5 cm      Tunneling: N/A     Undermining: N/A  Periwound skin: Intact and Scar tissue      Color: normal and consistent with surrounding tissue and pink      Temperature: normal   Odor: none  Pain: denies  and no grimacing or signs of discomfort, none  Pain interventions prior to dressing change: patient tolerated well and slow and gentle cares   Treatment goal: Heal , Infection control/prevention, and Protection  STATUS: stable  Supplies ordered: gathered, at bedside, supplies stored on unit, discussed with RN, and discussed with patient        Treatment Plan:     Left buttocks wound(s): Every 3 days and prn if soiled or damaged  Cleanse with wound cleanser and pat dry with gauze  Apply Xeroform to open area on left buttocks  Apply Sacral Mepilex  Encourage patient to turn at least every two hours, assist as needed to use pillows for repositioning  Use Koko Chair cushion when up in chair or wheelchair     Orders: Reviewed    RECOMMEND PRIMARY TEAM ORDER: None, at this  time  Education provided: importance of repositioning, plan of care, wound progress, Infection prevention , Moisture management, Hygiene, and Off-loading pressure  Discussed plan of care with: Patient and Nurse  WOC nurse follow-up plan: signing off  Notify WOC if wound(s) deteriorate.  Nursing to notify the Provider(s) and re-consult the WOC Nurse if new skin concern.    DATA:     Current support surface: patient seen on ED cart  Containment of urine/stool: Continent of bladder and Continent of bowel  BMI: Body mass index is 34.32 kg/m .   Active diet order: Orders Placed This Encounter      Renal Diet (dialysis)       Output: I/O last 3 completed shifts:  In: 2355.78 [I.V.:655.78; NG/GT:500]  Out: 500 [Other:500]     Labs:   Recent Labs   Lab 01/15/25  0507 01/14/25  0451 01/13/25  0514   ALBUMIN  --   --  2.8*   HGB 8.6*   < > 7.8*   WBC 6.2   < > 4.6    < > = values in this interval not displayed.       Pager no longer is use, please contact through [facility] group: WOC Nurse Eva

## 2025-01-16 ENCOUNTER — APPOINTMENT (OUTPATIENT)
Dept: CT IMAGING | Facility: CLINIC | Age: 75
End: 2025-01-16
Attending: INTERNAL MEDICINE
Payer: COMMERCIAL

## 2025-01-16 VITALS
WEIGHT: 224.43 LBS | RESPIRATION RATE: 12 BRPM | BODY MASS INDEX: 35.22 KG/M2 | OXYGEN SATURATION: 97 % | SYSTOLIC BLOOD PRESSURE: 140 MMHG | HEART RATE: 87 BPM | TEMPERATURE: 99.4 F | HEIGHT: 67 IN | DIASTOLIC BLOOD PRESSURE: 69 MMHG

## 2025-01-16 LAB
ALBUMIN SERPL BCG-MCNC: 3.2 G/DL (ref 3.5–5.2)
ALP SERPL-CCNC: 95 U/L (ref 40–150)
ALT SERPL W P-5'-P-CCNC: 164 U/L (ref 0–50)
ANION GAP SERPL CALCULATED.3IONS-SCNC: 14 MMOL/L (ref 7–15)
AST SERPL W P-5'-P-CCNC: 90 U/L (ref 0–45)
BASE EXCESS BLDV CALC-SCNC: 4.3 MMOL/L (ref -3–3)
BILIRUB DIRECT SERPL-MCNC: <0.2 MG/DL (ref 0–0.3)
BILIRUB SERPL-MCNC: 0.2 MG/DL
BUN SERPL-MCNC: 62.8 MG/DL (ref 8–23)
CALCIUM SERPL-MCNC: 10.6 MG/DL (ref 8.8–10.4)
CHLORIDE SERPL-SCNC: 89 MMOL/L (ref 98–107)
CREAT SERPL-MCNC: 3.48 MG/DL (ref 0.51–0.95)
EGFRCR SERPLBLD CKD-EPI 2021: 13 ML/MIN/1.73M2
ERYTHROCYTE [DISTWIDTH] IN BLOOD BY AUTOMATED COUNT: 14.6 % (ref 10–15)
ERYTHROCYTE [DISTWIDTH] IN BLOOD BY AUTOMATED COUNT: 14.9 % (ref 10–15)
GLUCOSE BLDC GLUCOMTR-MCNC: 204 MG/DL (ref 70–99)
GLUCOSE BLDC GLUCOMTR-MCNC: 268 MG/DL (ref 70–99)
GLUCOSE BLDC GLUCOMTR-MCNC: 343 MG/DL (ref 70–99)
GLUCOSE BLDC GLUCOMTR-MCNC: 351 MG/DL (ref 70–99)
GLUCOSE BLDC GLUCOMTR-MCNC: 359 MG/DL (ref 70–99)
GLUCOSE BLDC GLUCOMTR-MCNC: 376 MG/DL (ref 70–99)
GLUCOSE SERPL-MCNC: 362 MG/DL (ref 70–99)
HCO3 BLDV-SCNC: 29 MMOL/L (ref 21–28)
HCO3 SERPL-SCNC: 27 MMOL/L (ref 22–29)
HCT VFR BLD AUTO: 26.3 % (ref 35–47)
HCT VFR BLD AUTO: 27.1 % (ref 35–47)
HGB BLD-MCNC: 8.5 G/DL (ref 11.7–15.7)
HGB BLD-MCNC: 8.6 G/DL (ref 11.7–15.7)
MCH RBC QN AUTO: 30.5 PG (ref 26.5–33)
MCH RBC QN AUTO: 31 PG (ref 26.5–33)
MCHC RBC AUTO-ENTMCNC: 31.7 G/DL (ref 31.5–36.5)
MCHC RBC AUTO-ENTMCNC: 32.3 G/DL (ref 31.5–36.5)
MCV RBC AUTO: 96 FL (ref 78–100)
MCV RBC AUTO: 96 FL (ref 78–100)
O2/TOTAL GAS SETTING VFR VENT: 30 %
OXYHGB MFR BLDV: 86 % (ref 70–75)
PCO2 BLDV: 46 MM HG (ref 40–50)
PH BLDV: 7.42 [PH] (ref 7.32–7.43)
PLATELET # BLD AUTO: 258 10E3/UL (ref 150–450)
PLATELET # BLD AUTO: 259 10E3/UL (ref 150–450)
PO2 BLDV: 58 MM HG (ref 25–47)
POTASSIUM SERPL-SCNC: 5.3 MMOL/L (ref 3.4–5.3)
PROT SERPL-MCNC: 6.6 G/DL (ref 6.4–8.3)
RBC # BLD AUTO: 2.74 10E6/UL (ref 3.8–5.2)
RBC # BLD AUTO: 2.82 10E6/UL (ref 3.8–5.2)
SAO2 % BLDV: 88.7 % (ref 70–75)
SODIUM SERPL-SCNC: 130 MMOL/L (ref 135–145)
UFH PPP CHRO-ACNC: 0.56 IU/ML
WBC # BLD AUTO: 12.1 10E3/UL (ref 4–11)
WBC # BLD AUTO: 8.5 10E3/UL (ref 4–11)

## 2025-01-16 PROCEDURE — 85027 COMPLETE CBC AUTOMATED: CPT | Performed by: INTERNAL MEDICINE

## 2025-01-16 PROCEDURE — 258N000003 HC RX IP 258 OP 636: Performed by: INTERNAL MEDICINE

## 2025-01-16 PROCEDURE — 99291 CRITICAL CARE FIRST HOUR: CPT | Performed by: INTERNAL MEDICINE

## 2025-01-16 PROCEDURE — 82805 BLOOD GASES W/O2 SATURATION: CPT | Performed by: INTERNAL MEDICINE

## 2025-01-16 PROCEDURE — 250N000011 HC RX IP 250 OP 636: Performed by: INTERNAL MEDICINE

## 2025-01-16 PROCEDURE — 85520 HEPARIN ASSAY: CPT | Performed by: INTERNAL MEDICINE

## 2025-01-16 PROCEDURE — 250N000011 HC RX IP 250 OP 636: Performed by: STUDENT IN AN ORGANIZED HEALTH CARE EDUCATION/TRAINING PROGRAM

## 2025-01-16 PROCEDURE — 250N000009 HC RX 250: Performed by: STUDENT IN AN ORGANIZED HEALTH CARE EDUCATION/TRAINING PROGRAM

## 2025-01-16 PROCEDURE — 82435 ASSAY OF BLOOD CHLORIDE: CPT | Performed by: STUDENT IN AN ORGANIZED HEALTH CARE EDUCATION/TRAINING PROGRAM

## 2025-01-16 PROCEDURE — 94640 AIRWAY INHALATION TREATMENT: CPT | Mod: 76

## 2025-01-16 PROCEDURE — 84155 ASSAY OF PROTEIN SERUM: CPT | Performed by: INTERNAL MEDICINE

## 2025-01-16 PROCEDURE — 200N000001 HC R&B ICU

## 2025-01-16 PROCEDURE — 634N000001 HC RX 634: Mod: JZ | Performed by: INTERNAL MEDICINE

## 2025-01-16 PROCEDURE — 999N000040 HC STATISTIC CONSULT NO CHARGE VASC ACCESS

## 2025-01-16 PROCEDURE — 36415 COLL VENOUS BLD VENIPUNCTURE: CPT | Performed by: STUDENT IN AN ORGANIZED HEALTH CARE EDUCATION/TRAINING PROGRAM

## 2025-01-16 PROCEDURE — 250N000009 HC RX 250: Performed by: INTERNAL MEDICINE

## 2025-01-16 PROCEDURE — 85014 HEMATOCRIT: CPT | Performed by: STUDENT IN AN ORGANIZED HEALTH CARE EDUCATION/TRAINING PROGRAM

## 2025-01-16 PROCEDURE — 36415 COLL VENOUS BLD VENIPUNCTURE: CPT | Performed by: INTERNAL MEDICINE

## 2025-01-16 PROCEDURE — 94640 AIRWAY INHALATION TREATMENT: CPT

## 2025-01-16 PROCEDURE — 250N000013 HC RX MED GY IP 250 OP 250 PS 637: Performed by: STUDENT IN AN ORGANIZED HEALTH CARE EDUCATION/TRAINING PROGRAM

## 2025-01-16 PROCEDURE — 90935 HEMODIALYSIS ONE EVALUATION: CPT

## 2025-01-16 PROCEDURE — 999N000128 HC STATISTIC PERIPHERAL IV START W/O US GUIDANCE

## 2025-01-16 PROCEDURE — 999N000157 HC STATISTIC RCP TIME EA 10 MIN

## 2025-01-16 PROCEDURE — 82040 ASSAY OF SERUM ALBUMIN: CPT | Performed by: INTERNAL MEDICINE

## 2025-01-16 PROCEDURE — 71275 CT ANGIOGRAPHY CHEST: CPT

## 2025-01-16 PROCEDURE — 250N000013 HC RX MED GY IP 250 OP 250 PS 637: Performed by: INTERNAL MEDICINE

## 2025-01-16 PROCEDURE — 80048 BASIC METABOLIC PNL TOTAL CA: CPT | Performed by: STUDENT IN AN ORGANIZED HEALTH CARE EDUCATION/TRAINING PROGRAM

## 2025-01-16 PROCEDURE — 94003 VENT MGMT INPAT SUBQ DAY: CPT

## 2025-01-16 PROCEDURE — 99232 SBSQ HOSP IP/OBS MODERATE 35: CPT | Performed by: INTERNAL MEDICINE

## 2025-01-16 RX ORDER — NALOXONE HYDROCHLORIDE 0.4 MG/ML
0.2 INJECTION, SOLUTION INTRAMUSCULAR; INTRAVENOUS; SUBCUTANEOUS
Status: DISCONTINUED | OUTPATIENT
Start: 2025-01-16 | End: 2025-01-25

## 2025-01-16 RX ORDER — BUDESONIDE 1 MG/2ML
1 INHALANT ORAL 2 TIMES DAILY
Status: DISCONTINUED | OUTPATIENT
Start: 2025-01-16 | End: 2025-01-25

## 2025-01-16 RX ORDER — IOPAMIDOL 755 MG/ML
78 INJECTION, SOLUTION INTRAVASCULAR ONCE
Status: COMPLETED | OUTPATIENT
Start: 2025-01-16 | End: 2025-01-16

## 2025-01-16 RX ORDER — AMIODARONE HYDROCHLORIDE 200 MG/1
400 TABLET ORAL 2 TIMES DAILY
Status: DISCONTINUED | OUTPATIENT
Start: 2025-01-16 | End: 2025-01-20

## 2025-01-16 RX ORDER — ROPIVACAINE IN 0.9% SOD CHL/PF 0.1 %
.01-.125 PLASTIC BAG, INJECTION (ML) EPIDURAL CONTINUOUS
Status: DISCONTINUED | OUTPATIENT
Start: 2025-01-16 | End: 2025-01-18

## 2025-01-16 RX ORDER — NALOXONE HYDROCHLORIDE 0.4 MG/ML
0.4 INJECTION, SOLUTION INTRAMUSCULAR; INTRAVENOUS; SUBCUTANEOUS
Status: DISCONTINUED | OUTPATIENT
Start: 2025-01-16 | End: 2025-01-25

## 2025-01-16 RX ORDER — AMIODARONE HYDROCHLORIDE 200 MG/1
200 TABLET ORAL 2 TIMES DAILY
Status: DISCONTINUED | OUTPATIENT
Start: 2025-01-20 | End: 2025-01-20

## 2025-01-16 RX ORDER — HEPARIN SODIUM 10000 [USP'U]/100ML
0-5000 INJECTION, SOLUTION INTRAVENOUS CONTINUOUS
Status: DISCONTINUED | OUTPATIENT
Start: 2025-01-16 | End: 2025-01-20

## 2025-01-16 RX ORDER — FENTANYL CITRATE 0.05 MG/ML
25-50 INJECTION, SOLUTION INTRAMUSCULAR; INTRAVENOUS
Status: DISCONTINUED | OUTPATIENT
Start: 2025-01-16 | End: 2025-01-25

## 2025-01-16 RX ORDER — AMIODARONE HYDROCHLORIDE 200 MG/1
200 TABLET ORAL DAILY
Status: DISCONTINUED | OUTPATIENT
Start: 2025-01-23 | End: 2025-01-20

## 2025-01-16 RX ADMIN — INSULIN ASPART 5 UNITS: 100 INJECTION, SOLUTION INTRAVENOUS; SUBCUTANEOUS at 04:12

## 2025-01-16 RX ADMIN — CETIRIZINE HYDROCHLORIDE 5 MG: 5 TABLET ORAL at 21:04

## 2025-01-16 RX ADMIN — SODIUM CHLORIDE 200 ML: 9 INJECTION, SOLUTION INTRAVENOUS at 13:08

## 2025-01-16 RX ADMIN — SEVELAMER CARBONATE 800 MG: 800 TABLET, FILM COATED ORAL at 18:14

## 2025-01-16 RX ADMIN — METHYLPREDNISOLONE SODIUM SUCCINATE 40 MG: 40 INJECTION, POWDER, FOR SOLUTION INTRAMUSCULAR; INTRAVENOUS at 20:47

## 2025-01-16 RX ADMIN — Medication 50 MCG: at 08:21

## 2025-01-16 RX ADMIN — HEPARIN SODIUM 5000 UNITS: 5000 INJECTION, SOLUTION INTRAVENOUS; SUBCUTANEOUS at 06:03

## 2025-01-16 RX ADMIN — INSULIN ASPART 5 UNITS: 100 INJECTION, SOLUTION INTRAVENOUS; SUBCUTANEOUS at 00:38

## 2025-01-16 RX ADMIN — IPRATROPIUM BROMIDE AND ALBUTEROL SULFATE 3 ML: .5; 3 SOLUTION RESPIRATORY (INHALATION) at 07:20

## 2025-01-16 RX ADMIN — IPRATROPIUM BROMIDE AND ALBUTEROL SULFATE 3 ML: .5; 3 SOLUTION RESPIRATORY (INHALATION) at 10:35

## 2025-01-16 RX ADMIN — INSULIN ASPART 5 UNITS: 100 INJECTION, SOLUTION INTRAVENOUS; SUBCUTANEOUS at 08:27

## 2025-01-16 RX ADMIN — IOPAMIDOL 78 ML: 755 INJECTION, SOLUTION INTRAVENOUS at 16:39

## 2025-01-16 RX ADMIN — NOREPINEPHRINE BITARTRATE 0.03 MCG/KG/MIN: 0.02 INJECTION, SOLUTION INTRAVENOUS at 14:38

## 2025-01-16 RX ADMIN — HEPARIN SODIUM 1200 UNITS/HR: 10000 INJECTION, SOLUTION INTRAVENOUS at 14:46

## 2025-01-16 RX ADMIN — DEXMEDETOMIDINE HYDROCHLORIDE 0.2 MCG/KG/HR: 400 INJECTION INTRAVENOUS at 08:39

## 2025-01-16 RX ADMIN — FUROSEMIDE 80 MG: 20 TABLET ORAL at 15:31

## 2025-01-16 RX ADMIN — ACETAMINOPHEN 650 MG: 325 TABLET, FILM COATED ORAL at 20:48

## 2025-01-16 RX ADMIN — SODIUM CHLORIDE 100 ML: 9 INJECTION, SOLUTION INTRAVENOUS at 16:40

## 2025-01-16 RX ADMIN — AMIODARONE HYDROCHLORIDE 0.5 MG/MIN: 1.8 INJECTION, SOLUTION INTRAVENOUS at 06:53

## 2025-01-16 RX ADMIN — TACROLIMUS: 1 OINTMENT TOPICAL at 08:21

## 2025-01-16 RX ADMIN — BUDESONIDE 1 MG: 1 INHALANT ORAL at 20:32

## 2025-01-16 RX ADMIN — IPRATROPIUM BROMIDE AND ALBUTEROL SULFATE 3 ML: .5; 3 SOLUTION RESPIRATORY (INHALATION) at 20:32

## 2025-01-16 RX ADMIN — SEVELAMER CARBONATE 800 MG: 800 TABLET, FILM COATED ORAL at 11:34

## 2025-01-16 RX ADMIN — TACROLIMUS: 1 OINTMENT TOPICAL at 20:53

## 2025-01-16 RX ADMIN — EPOETIN ALFA-EPBX 10000 UNITS: 10000 INJECTION, SOLUTION INTRAVENOUS; SUBCUTANEOUS at 13:09

## 2025-01-16 RX ADMIN — METHYLPREDNISOLONE SODIUM SUCCINATE 40 MG: 40 INJECTION, POWDER, FOR SOLUTION INTRAMUSCULAR; INTRAVENOUS at 04:13

## 2025-01-16 RX ADMIN — Medication 40 MG: at 08:21

## 2025-01-16 RX ADMIN — GABAPENTIN 100 MG: 250 SOLUTION ORAL at 08:22

## 2025-01-16 RX ADMIN — CHLORHEXIDINE GLUCONATE 15 ML: 1.2 RINSE ORAL at 20:46

## 2025-01-16 RX ADMIN — BUDESONIDE 1 MG: 1 INHALANT ORAL at 10:36

## 2025-01-16 RX ADMIN — SERTRALINE HYDROCHLORIDE 75 MG: 50 TABLET ORAL at 20:47

## 2025-01-16 RX ADMIN — IPRATROPIUM BROMIDE AND ALBUTEROL SULFATE 3 ML: .5; 3 SOLUTION RESPIRATORY (INHALATION) at 14:53

## 2025-01-16 RX ADMIN — ATORVASTATIN CALCIUM 20 MG: 10 TABLET, FILM COATED ORAL at 20:47

## 2025-01-16 RX ADMIN — FUROSEMIDE 80 MG: 20 TABLET ORAL at 08:21

## 2025-01-16 RX ADMIN — METHYLPREDNISOLONE SODIUM SUCCINATE 40 MG: 40 INJECTION, POWDER, FOR SOLUTION INTRAMUSCULAR; INTRAVENOUS at 11:34

## 2025-01-16 RX ADMIN — SEVELAMER CARBONATE 800 MG: 800 TABLET, FILM COATED ORAL at 08:21

## 2025-01-16 RX ADMIN — Medication 5 ML: at 08:22

## 2025-01-16 RX ADMIN — INSULIN ASPART 5 UNITS: 100 INJECTION, SOLUTION INTRAVENOUS; SUBCUTANEOUS at 11:34

## 2025-01-16 RX ADMIN — SODIUM CHLORIDE 250 ML: 9 INJECTION, SOLUTION INTRAVENOUS at 13:09

## 2025-01-16 RX ADMIN — AMIODARONE HYDROCHLORIDE 400 MG: 200 TABLET ORAL at 20:46

## 2025-01-16 RX ADMIN — CHLORHEXIDINE GLUCONATE 15 ML: 1.2 RINSE ORAL at 08:21

## 2025-01-16 ASSESSMENT — ACTIVITIES OF DAILY LIVING (ADL)
ADLS_ACUITY_SCORE: 71

## 2025-01-16 NOTE — PROGRESS NOTES
ICU Progress Note   Date of Service: 01/16/25    Assessment and Plan:  Supriya Herr is a 74 year old female with a history of ESRD on HD, CHF, COPD, DM type II, hypertension who was admitted 1/8 with influenza A. This morning during dialysis, an RRT was called for hypoxia and she was intubated. The patient was admitted to the ICU for medical management.      Interval events:   Failed SBT yesterday and this morning     Neuro:  # Sedation for mechanical ventilation  # Likely baseline cognitive impairment  - Precedex for sedation  - RASS goal of 0 to -1  - PTA Zoloft, gabapentin     CV:  # Shock, septic vs polypharmacy (resolved)  # Hx of CHF  # Hx of HTN, HLD  # Afib RVR  - MAP goal > 65, not requiring pressors  - Has been on amidarone gtt, switch to PO today  - Given high CHADS Vasc score and persistent Afib will start on IV heparin gtt  - Hold home amlodipine, carvedilol  - discontinue furosemide as she is not making urine     Pulm:  # Acute hypoxemic respiratory failure  # Flu A  # COPD exacerbation  # JONE  - low tidal volume ventilation  - BLE Doppler US negative 1/9  - S/p 5 days of abx coverage  - failed spontaneous breathing trial again today  - Continue methylpred steroids 40 q8 + nebs  - Cont pulmicort nebs BID  - Will obtain CTA chest today to r/o PE  - Appreciate nephrology help with fluid removal via iHD     GI:  - RD to manage TF     Renal:  # ESRD on HD  - Nephrology consult, appreciate recs  - Fluid removal with iHD  - Will add low dose pressors as needed to help with fluid removal     ID:  # Influenza A pneumonia  # ? Community acquired pneumonia   - sputum culture no growth   - Oseltamivir started on admission, continue   - S/p 5 days of cefepime       Heme:  #Chronic anemia   - Hgb baseline 9-10   - Transfuse for Hgb < 7    # Afib new onset  - start heparin gtt     Endo:  # Hx of DMII   - resume glargine 12 units daily given hyperglycemia with steroids   - SSI     PPx:  1. DVT: heparin  2. VAP: HOB  "30 degrees, chlorhexidine rinse  3. Stress Ulcer: PPI  4. Restraints: Nonviolent soft two point restraints required and necessary for patient safety and continued cares and good effect as patient continues to pull at necessary lines, tubes despite education and distraction. Will readdress daily.   5. Wound care - per unit routine   6. Feeding - TF  7. Family updated at the bedside (daughter)     Dispo: ICU      BP (!) 142/106   Pulse 84   Temp 97.5  F (36.4  C) (Axillary)   Resp 10   Ht 1.702 m (5' 7\")   Wt 101.8 kg (224 lb 6.9 oz)   LMP  (LMP Unknown)   SpO2 95%   BMI 35.15 kg/m      FiO2 (%): 30 %, Resp: 10, Vent Mode: CPAP/PS, Resp Rate (Set): 12 breaths/min, Tidal Volume (Set, mL): (S) 470 mL, PEEP (cm H2O): 5 cmH2O, Pressure Support (cm H2O): 10 cmH2O, Resp Rate (Set): 12 breaths/min, Tidal Volume (Set, mL): (S) 470 mL, PEEP (cm H2O): 5 cmH2O      Intake/Output Summary (Last 24 hours) at 1/16/2025 1334  Last data filed at 1/16/2025 1200  Gross per 24 hour   Intake 2126.24 ml   Output --   Net 2126.24 ml       Physical Exam:  Gen: Laying in bed in NAD  HEENT: NC AT  Resp: intubated, B/L air entry, no wheezing   CVS: S1 S2, regular rhythm on tele  Abd: Soft NT ND  Ext: 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  She was in critical condition as the result of acute hypoxic resp failure.      Her condition is now Fair.      The acute issues managed by me today include acute hypoxic respiratory failure, Flu, ESRD on HD, fluid overload, Afib RVR, and DM   Supportive interventions provided and/or ordered by me include mechanical ventilation, iHD, oseltamivir, amiodarone, IV heparin, and monitoring.    Total Critical Care time spent by me, excluding procedures, was 55 " minutes.    Cheo Watt MD

## 2025-01-16 NOTE — PROVIDER NOTIFICATION
MD Notification    Notified Person: MD    Notified Person Name: Dr. Plata    Notification Date/Time: 1/16/2025    Notification Interaction: face-to-face    1720: MD notified regarding CT PE results.   Comments: MD aware     ,DirectAddress_Unknown,DirectAddress_Unknown

## 2025-01-16 NOTE — PROGRESS NOTES
[facility] ICU RESPIRATORY NOTE        Date of Admission: 1/8/2025    Date of Intubation (most recent): 1/9/25    Reason for Mechanical Ventilation: AW protection    Number of Days on Mechanical Ventilation: 8    Met Criteria for Spontaneous Breathing Trial: No    Reason for No Spontaneous Breathing Trial: Joel YEUNG    Bite Block: No    Significant Events Today: None    ABG Results:   Recent Labs   Lab 01/09/25  1540   O2PER 30         Current Vent Settings: FiO2 (%): 30 %, Resp: 13, Vent Mode: CMV/AC, Resp Rate (Set): 12 breaths/min, Tidal Volume (Set, mL): 480 mL, PEEP (cm H2O): 5 cmH2O, Resp Rate (Set): 12 breaths/min, Tidal Volume (Set, mL): 480 mL, PEEP (cm H2O): 5 cmH2O    Skin Assessment: Intact    Plan: continue on full vent support     RT Jak on 1/16/2025 at 4:24 AM

## 2025-01-16 NOTE — PROGRESS NOTES
Potassium   Date Value Ref Range Status   01/16/2025 5.3 3.4 - 5.3 mmol/L Final   02/02/2022 4.7 3.4 - 5.3 mmol/L Final   04/17/2021 4.2 3.4 - 5.3 mmol/L Final     Hemoglobin   Date Value Ref Range Status   01/16/2025 8.6 (L) 11.7 - 15.7 g/dL Final   04/16/2021 10.9 (L) 11.7 - 15.7 g/dL Final     Creatinine   Date Value Ref Range Status   01/16/2025 3.48 (H) 0.51 - 0.95 mg/dL Final   04/17/2021 5.98 (H) 0.52 - 1.04 mg/dL Final     Urea Nitrogen   Date Value Ref Range Status   01/16/2025 62.8 (H) 8.0 - 23.0 mg/dL Final   11/24/2021 37 (H) 7 - 30 mg/dL Final   04/17/2021 68 (H) 7 - 30 mg/dL Final     Sodium   Date Value Ref Range Status   01/16/2025 130 (L) 135 - 145 mmol/L Final   04/17/2021 137 133 - 144 mmol/L Final     INR   Date Value Ref Range Status   04/24/2024 1.11 0.85 - 1.15 Final   04/16/2021 1.14 0.86 - 1.14 Final       DIALYSIS PROCEDURE NOTE  Hepatitis status of previous patient on machine log was checked and verified ok to use with this patients hepatitis status.  Patient dialyzed for 3 hrs. on a K2 bath with a net fluid removal of  2L.  A BFR of 380- 450 ml/min was obtained via a lrft AVF using 15 gauge needles.      The treatment plan was discussed with Dr. Langley during the treatment.    Total heparin received during the treatment: 0 units.   Needle cannulation sites held x 15 min in total.       Meds  given: Epoetin [address]22   Complications: None      Person educated: Patient and daughter. Knowledge base susbtantial. Barriers to learning: pt sedated. Educated on procedure via verbal mode. Daughter verbalized understanding.   ICEBOAT? Timeout performed pre-treatment  I: Patient was identified using 2 identifiers  C:  Consent Signed Yes  E: Equipment preventative maintenance is current and dialysis delivery system OK to use  B: Hepatitis B Surface Antigen: Negative; Draw Date: 01/08/2025      Hepatitis B Surface Antibody: Susceptible; Draw Date: 01/09/2025  O: Dialysis orders present and complete prior  to treatment  A: Vascular access verified and assessed prior to treatment  T: Treatment was performed at a clinically appropriate time  ?: Patient was allowed to ask questions and address concerns prior to treatment  See Adult Hemodialysis flowsheet in EPIC for further details and post assessment.  Machine water alarm in place and functioning. Transducer pods intact and checked every 15min.     Chlorine/Chloramine water system checked every 4 hours.  Chronic dialysis at [facility], Tuesday Thursday Saturday       Post treatment report given to LARISA Dyson RN regarding 2L of fluid removed.    Please remove patient dressing on AVF and AVG needle sites 24 hours after dialysis. If leaking occurs please apply a Band-Aid.     RADHA Ulloa RN

## 2025-01-16 NOTE — PROGRESS NOTES
CLINICAL NUTRITION SERVICES - REASSESSMENT NOTE     Malnutrition Status (1/13):    % Intake: No decreased intake noted  % Weight Loss: Weight loss does not meet criteria  (fluids)  Subcutaneous Fat Loss: None observed (visual in isolation)  Muscle Loss: None observed however may be masked by obesity (visual in isolation)  Fluid Accumulation/Edema: Mild, 1+ (likely not nutritional)  Malnutrition Diagnosis: Patient does not meet two of the established criteria necessary for diagnosing malnutrition  Malnutrition Present on Admission: Unable to assess    Registered Dietitian Interventions:  Enteral nutrition management - Orders written to increase goal TF to Vital HP at 55 mL/hr= 1320 kcal (14 kcal/kg), 115 g protein (2.1 g/kg and 105% needs), 147 g CHO, 0 g fiber, 1104 mL H2O  Team meeting involving nutrition professional - Patient discussed today during interdisciplinary bedside rounds       SUBJECTIVE INFORMATION  Patient not available for interview due to intubated status    CURRENT NUTRITION ORDERS  Diet: NPO  Nutrition Support: Continues on goal TF regimen as follows ~    Nutrition Support Enteral:  Type of Feeding Tube: OG  Enteral Frequency:  Continuous  Enteral Regimen: Vital HP at 50 mL/hr  Total Enteral Provisions: 1200 kcal (12 kcal/kg and 89% needs), 104 g protein (1.9 g/kg), 133 g CHO, 1003 mL H2O  Free Water Flush: 60 mL every 4 hours       CURRENT INTAKE/TOLERANCE  Patient tolerating TF without any documented issues      NEW FINDINGS  Weight: 101.8 kg (trending upward- likely fluids), I/O incomplete (HD)  Skin/wounds: Left buttock wound d/t friction (WOCN following)  GI symptoms: BM x 1 today and x 1 yesterday  Nutrition-relevant labs:  Na 130 (L), K normal, BUN 62.8 (H), Cr 3.48 (H) - HD, -362 with Med sliding scale + Lantus 12 units per day  Nutrition-relevant medications:  Nephronex, Lasix, Solumedrol       ASSESSED NUTRITION NEEDS  Estimated Energy Needs: 9575-1034 kcals/day (14 - 17  kcals/kg)  Justification: Obese and Vented  Estimated Protein Needs:  grams protein/day (1.5 - 2 grams of pro/kg)  Justification: Hypercatabolism with critical illness and Obesity guidelines  Estimated Fluid Needs: 1600 mL/day (1 mL/kcal)  Justification:  or per MD     EVALUATION OF THE PROGRESS TOWARD GOALS   Previous Goals (1/13)  Goal TF regimen will continue to meet % needs   Evaluation: Unable to meet now that Propofol off     Previous Nutrition Diagnosis (1/13)  No nutrition diagnosis at this time as TF meeting needs   Evaluation: No longer applicable, nutrition diagnosis changed below    NUTRITION DIAGNOSIS  Inadequate energy intake related to TF at goal, Propofol off as evidenced by meeting 88% low-end energy needs     INTERVENTIONS  Enteral nutrition management - Orders written to increase goal TF to Vital HP at 55 mL/hr= 1320 kcal (14 kcal/kg), 115 g protein (2.1 g/kg and 105% needs), 147 g CHO, 0 g fiber, 1104 mL H2O  Team meeting involving nutrition professional - Patient discussed today during interdisciplinary bedside rounds    Goals  Goal TF regimen will meet % needs      Monitoring/Evaluation      Progress toward goals will be monitored and evaluated per policy    Miguelina Feliciano, RD, LD, CNSC   Clinical Dietitian - [facility]

## 2025-01-16 NOTE — PROGRESS NOTES
[facility] ICU RESPIRATORY NOTE        Date of Admission: 1/8/2025    Date of Intubation (most recent): 01/09/25    Reason for Mechanical Ventilation: Airway Protection    Number of Days on Mechanical Ventilation: 7    Met Criteria for Spontaneous Breathing Trial: Yes but failed both attempts d/t tachypnea    Bite Block: No    Significant Events Today: None    ABG Results:   Recent Labs   Lab 01/09/25  1540   O2PER 30         Current Vent Settings: FiO2 (%): 30 %, Resp: 15, Vent Mode: CMV/AC (PS attempted at this time, will reattempt again later), Resp Rate (Set): 12 breaths/min, Tidal Volume (Set, mL): 480 mL, PEEP (cm H2O): 5 cmH2O, Pressure Support (cm H2O): 5 cmH2O, Resp Rate (Set): 12 breaths/min, Tidal Volume (Set, mL): 480 mL, PEEP (cm H2O): 5 cmH2O      Gaston Fernandez, RT on 1/15/2025 at 6:07 PM

## 2025-01-16 NOTE — PLAN OF CARE
Goal Outcome Evaluation:      Plan of Care Reviewed With: patient, child    Overall Patient Progress: improvingOverall Patient Progress: improving    Outcome Evaluation: Patient intubated and sedated maintaining RASS goal (0)-(-1) utilizing Precedex gtt. Episode of nystagmus in bilat eyes and lip smacking after stopping propofol- MD notified and symptoms resolved.  AFIB CVR/RVR- amio gtt continued. MAP maintained >65. Attempted to PS but pt had increased RR. Vent CMV/AC- 30% PEEP 5. OG w/ TF to goal. Anuric- plan for HD tomorrow 1/16. Daughter updated at bedside.      Problem: Fall Injury Risk  Goal: Absence of Fall and Fall-Related Injury  Outcome: Progressing  Intervention: Identify and Manage Contributors  Recent Flowsheet Documentation  Taken 1/15/2025 1600 by Jacquelyn Dyson RN  Medication Review/Management: medications reviewed  Taken 1/15/2025 1200 by Jacquelyn Dyson RN  Medication Review/Management: medications reviewed  Taken 1/15/2025 0800 by Jacquelyn Dyson RN  Medication Review/Management: medications reviewed  Intervention: Promote Injury-Free Environment  Recent Flowsheet Documentation  Taken 1/15/2025 1800 by Jacquelyn Dyson RN  Safety Promotion/Fall Prevention: safety round/check completed  Taken 1/15/2025 1600 by Jacquelyn Dyson RN  Safety Promotion/Fall Prevention: safety round/check completed  Taken 1/15/2025 1400 by Jacquelyn Dyson RN  Safety Promotion/Fall Prevention: safety round/check completed  Taken 1/15/2025 1200 by Jacquelyn Dyson RN  Safety Promotion/Fall Prevention: safety round/check completed  Taken 1/15/2025 1000 by Jacquelyn Dyson RN  Safety Promotion/Fall Prevention: safety round/check completed  Taken 1/15/2025 0800 by Jacquelyn Dyson RN  Safety Promotion/Fall Prevention: safety round/check completed     Problem: Pain Acute  Goal: Optimal Pain Control and Function  Outcome: Progressing  Intervention: Optimize Psychosocial Wellbeing  Recent Flowsheet  Documentation  Taken 1/15/2025 1600 by Jacquelyn Dyson RN  Supportive Measures: positive reinforcement provided  Taken 1/15/2025 1200 by Jacquelyn Dyson RN  Supportive Measures: positive reinforcement provided  Taken 1/15/2025 0800 by Jacquelyn Dyson RN  Supportive Measures: positive reinforcement provided  Intervention: Develop Pain Management Plan  Recent Flowsheet Documentation  Taken 1/15/2025 1800 by Jacquelyn Dyson RN  Pain Management Interventions:   rest   repositioned   relaxation techniques promoted  Taken 1/15/2025 1600 by Jacquelyn Dyson RN  Pain Management Interventions:   rest   repositioned   relaxation techniques promoted  Taken 1/15/2025 1400 by Jacquelyn Dyson RN  Pain Management Interventions:   rest   repositioned   relaxation techniques promoted  Taken 1/15/2025 1200 by Jacquelyn Dyson RN  Pain Management Interventions:   rest   repositioned   relaxation techniques promoted  Taken 1/15/2025 1000 by Jacquelyn Dyson RN  Pain Management Interventions:   rest   repositioned   relaxation techniques promoted  Taken 1/15/2025 0800 by Jacquelyn Dyson RN  Pain Management Interventions:   rest   repositioned   relaxation techniques promoted  Intervention: Prevent or Manage Pain  Recent Flowsheet Documentation  Taken 1/15/2025 1800 by Jacquelyn Dyson RN  Sleep/Rest Enhancement:   awakenings minimized   comfort measures   consistent schedule promoted   medication   natural light exposure provided   relaxation techniques promoted   regular sleep/rest pattern promoted   visualization  Taken 1/15/2025 1600 by Jacquelyn Dyson RN  Sensory Stimulation Regulation:   care clustered   quiet environment promoted  Sleep/Rest Enhancement:   awakenings minimized   comfort measures   consistent schedule promoted   medication   natural light exposure provided   relaxation techniques promoted   regular sleep/rest pattern promoted   visualization  Complementary Therapy:   music therapy provided    essential oils utilized   aromatherapy utilized  Medication Review/Management: medications reviewed  Taken 1/15/2025 1400 by Jacquelyn Dyson RN  Sleep/Rest Enhancement:   awakenings minimized   comfort measures   consistent schedule promoted   medication   natural light exposure provided   relaxation techniques promoted   regular sleep/rest pattern promoted   visualization  Taken 1/15/2025 1200 by Jacquelyn Dyson RN  Sensory Stimulation Regulation:   care clustered   quiet environment promoted  Sleep/Rest Enhancement:   awakenings minimized   comfort measures   consistent schedule promoted   medication   natural light exposure provided   relaxation techniques promoted   regular sleep/rest pattern promoted   visualization  Complementary Therapy:   music therapy provided   essential oils utilized   aromatherapy utilized  Medication Review/Management: medications reviewed  Taken 1/15/2025 1000 by Jacquelyn Dyson RN  Sleep/Rest Enhancement:   awakenings minimized   comfort measures   consistent schedule promoted   medication   natural light exposure provided   relaxation techniques promoted   regular sleep/rest pattern promoted   visualization  Taken 1/15/2025 0800 by Jacquelyn Dyson RN  Sensory Stimulation Regulation:   care clustered   quiet environment promoted  Sleep/Rest Enhancement:   awakenings minimized   comfort measures   consistent schedule promoted   medication   natural light exposure provided   relaxation techniques promoted   regular sleep/rest pattern promoted   visualization  Complementary Therapy:   music therapy provided   essential oils utilized   aromatherapy utilized  Medication Review/Management: medications reviewed     Problem: Gas Exchange Impaired  Goal: Optimal Gas Exchange  Outcome: Progressing  Intervention: Optimize Oxygenation and Ventilation  Recent Flowsheet Documentation  Taken 1/15/2025 1800 by Jacquelyn Dyson RN  Head of Bed (HOB) Positioning: HOB at 30 degrees  Taken  1/15/2025 1600 by Jacquelyn Dyson RN  Airway/Ventilation Management:   airway patency maintained   oxygen therapy provided   calming measures promoted  Head of Bed (HOB) Positioning: HOB at 30 degrees  Taken 1/15/2025 1400 by Jacquelyn Dyson RN  Head of Bed (HOB) Positioning: HOB at 30 degrees  Taken 1/15/2025 1200 by Jacquelyn Dyson RN  Airway/Ventilation Management:   airway patency maintained   oxygen therapy provided   calming measures promoted  Head of Bed (HOB) Positioning: HOB at 30 degrees  Taken 1/15/2025 1000 by Jacquelyn Dyson RN  Head of Bed (HOB) Positioning: HOB at 30 degrees  Taken 1/15/2025 0800 by Jacquelyn Dyson RN  Airway/Ventilation Management:   airway patency maintained   oxygen therapy provided   calming measures promoted  Head of Bed (HOB) Positioning: HOB at 30 degrees     Problem: Restraint, Nonviolent  Goal: Absence of Harm or Injury  Outcome: Progressing  Intervention: Implement Least Restrictive Safety Strategies  Recent Flowsheet Documentation  Taken 1/15/2025 1600 by Jacquelyn Dyson RN  Medical Device Protection: tubing secured  Taken 1/15/2025 1200 by Jacquelyn Dyson RN  Medical Device Protection: tubing secured  Taken 1/15/2025 0800 by Jacquelyn Dyson RN  Medical Device Protection: tubing secured  Intervention: Protect Dignity, Rights and Personal Wellbeing  Recent Flowsheet Documentation  Taken 1/15/2025 1800 by Jacquelyn Dyson RN  Trust Relationship/Rapport:   care explained   thoughts/feelings acknowledged   reassurance provided  Taken 1/15/2025 1600 by Jacquelyn Dyson RN  Trust Relationship/Rapport:   care explained   thoughts/feelings acknowledged   reassurance provided  Taken 1/15/2025 1400 by Jacquelyn Dyson RN  Trust Relationship/Rapport:   care explained   thoughts/feelings acknowledged   reassurance provided  Taken 1/15/2025 1200 by Jacquelyn Dyson RN  Trust Relationship/Rapport:   care explained   thoughts/feelings acknowledged   reassurance  provided  Taken 1/15/2025 1000 by Jacquelyn Dyson RN  Trust Relationship/Rapport:   care explained   thoughts/feelings acknowledged   reassurance provided  Taken 1/15/2025 0800 by Jcaquelyn yDson RN  Trust Relationship/Rapport:   care explained   thoughts/feelings acknowledged   reassurance provided  Intervention: Protect Skin and Joint Integrity  Recent Flowsheet Documentation  Taken 1/15/2025 1800 by Jacquelyn Dyson RN  Body Position:   turned   side-lying 30 degrees   legs elevated   heels elevated  Taken 1/15/2025 1600 by Jacquelyn Dyson RN  Body Position:   turned   side-lying 30 degrees   legs elevated   heels elevated  Skin Protection: incontinence pads utilized  Range of Motion: ROM (range of motion) performed  Taken 1/15/2025 1400 by Jacquelyn Dyson RN  Body Position:   turned   side-lying 30 degrees   legs elevated   heels elevated  Taken 1/15/2025 1200 by Jacquelyn Dyson RN  Body Position:   turned   side-lying 30 degrees   legs elevated   heels elevated  Skin Protection: incontinence pads utilized  Range of Motion: ROM (range of motion) performed  Taken 1/15/2025 1000 by Jacquelyn Dyson RN  Body Position:   turned   side-lying 30 degrees   legs elevated   heels elevated  Taken 1/15/2025 0800 by Jacquelyn Dyson RN  Body Position:   turned   side-lying 30 degrees   legs elevated   heels elevated  Skin Protection: incontinence pads utilized  Range of Motion: ROM (range of motion) performed     Problem: Risk for Delirium  Goal: Improved Sleep  Outcome: Progressing  Intervention: Promote Sleep  Recent Flowsheet Documentation  Taken 1/15/2025 1800 by Jacquelyn Dyson RN  Sleep/Rest Enhancement:   awakenings minimized   comfort measures   consistent schedule promoted   medication   natural light exposure provided   relaxation techniques promoted   regular sleep/rest pattern promoted   visualization  Taken 1/15/2025 1600 by Jacquelyn Dyson RN  Sleep/Rest Enhancement:   awakenings minimized    comfort measures   consistent schedule promoted   medication   natural light exposure provided   relaxation techniques promoted   regular sleep/rest pattern promoted   visualization  Taken 1/15/2025 1400 by Jacquelyn Dyson RN  Sleep/Rest Enhancement:   awakenings minimized   comfort measures   consistent schedule promoted   medication   natural light exposure provided   relaxation techniques promoted   regular sleep/rest pattern promoted   visualization  Taken 1/15/2025 1200 by Jacquelyn Dyson RN  Sleep/Rest Enhancement:   awakenings minimized   comfort measures   consistent schedule promoted   medication   natural light exposure provided   relaxation techniques promoted   regular sleep/rest pattern promoted   visualization  Taken 1/15/2025 1000 by Jacquelyn Dyson RN  Sleep/Rest Enhancement:   awakenings minimized   comfort measures   consistent schedule promoted   medication   natural light exposure provided   relaxation techniques promoted   regular sleep/rest pattern promoted   visualization  Taken 1/15/2025 0800 by Jacquelyn Dyson RN  Sleep/Rest Enhancement:   awakenings minimized   comfort measures   consistent schedule promoted   medication   natural light exposure provided   relaxation techniques promoted   regular sleep/rest pattern promoted   visualization     Problem: Mechanical Ventilation Invasive  Goal: Effective Communication  Outcome: Progressing  Intervention: Ensure Effective Communication  Recent Flowsheet Documentation  Taken 1/15/2025 1800 by Jacquelyn Dyson RN  Trust Relationship/Rapport:   care explained   thoughts/feelings acknowledged   reassurance provided  Taken 1/15/2025 1600 by Jacquelyn Dyson RN  Trust Relationship/Rapport:   care explained   thoughts/feelings acknowledged   reassurance provided  Taken 1/15/2025 1400 by Jacquelyn Dyson RN  Trust Relationship/Rapport:   care explained   thoughts/feelings acknowledged   reassurance provided  Taken 1/15/2025 1200 by Kiel  Jacquelyn CARRENO RN  Trust Relationship/Rapport:   care explained   thoughts/feelings acknowledged   reassurance provided  Taken 1/15/2025 1000 by Jacquelyn Dyson RN  Trust Relationship/Rapport:   care explained   thoughts/feelings acknowledged   reassurance provided  Taken 1/15/2025 0800 by Jacquelyn Dyson RN  Trust Relationship/Rapport:   care explained   thoughts/feelings acknowledged   reassurance provided  Goal: Optimal Device Function  Outcome: Progressing  Intervention: Optimize Device Care and Function  Recent Flowsheet Documentation  Taken 1/15/2025 1800 by Jacquelyn Dyson RN  Oral Care:   swabbed with antiseptic solution   suction provided  Taken 1/15/2025 1600 by Jacquelyn Dyson RN  Airway Safety Measures: all equipment/monitors on and audible  Airway/Ventilation Management:   airway patency maintained   oxygen therapy provided   calming measures promoted  Oral Care:   swabbed with antiseptic solution   suction provided  Taken 1/15/2025 1400 by Jacquelyn Dyson RN  Oral Care:   swabbed with antiseptic solution   suction provided  Taken 1/15/2025 1200 by Jacquelyn Dyson RN  Airway Safety Measures: all equipment/monitors on and audible  Airway/Ventilation Management:   airway patency maintained   oxygen therapy provided   calming measures promoted  Oral Care:   swabbed with antiseptic solution   suction provided  Taken 1/15/2025 1000 by Jacquelyn Dyson RN  Oral Care:   swabbed with antiseptic solution   suction provided  Taken 1/15/2025 0800 by Jacquelyn Dyson RN  Airway Safety Measures: all equipment/monitors on and audible  Airway/Ventilation Management:   airway patency maintained   oxygen therapy provided   calming measures promoted  Oral Care:   swabbed with antiseptic solution   suction provided  Goal: Mechanical Ventilation Liberation  Outcome: Progressing  Intervention: Promote Extubation and Mechanical Ventilation Liberation  Recent Flowsheet Documentation  Taken 1/15/2025 1800 by Kiel  Jacquelyn J, RN  Sleep/Rest Enhancement:   awakenings minimized   comfort measures   consistent schedule promoted   medication   natural light exposure provided   relaxation techniques promoted   regular sleep/rest pattern promoted   visualization  Taken 1/15/2025 1600 by Jacquelyn Dyson RN  Sleep/Rest Enhancement:   awakenings minimized   comfort measures   consistent schedule promoted   medication   natural light exposure provided   relaxation techniques promoted   regular sleep/rest pattern promoted   visualization  Medication Review/Management: medications reviewed  Environmental Support:   calm environment promoted   distractions minimized   environmental consistency promoted   personal routine supported   rest periods encouraged  Taken 1/15/2025 1400 by Jacquelyn Dyson RN  Sleep/Rest Enhancement:   awakenings minimized   comfort measures   consistent schedule promoted   medication   natural light exposure provided   relaxation techniques promoted   regular sleep/rest pattern promoted   visualization  Taken 1/15/2025 1200 by Jacquelyn Dyson RN  Sleep/Rest Enhancement:   awakenings minimized   comfort measures   consistent schedule promoted   medication   natural light exposure provided   relaxation techniques promoted   regular sleep/rest pattern promoted   visualization  Medication Review/Management: medications reviewed  Environmental Support:   calm environment promoted   distractions minimized   environmental consistency promoted   personal routine supported   rest periods encouraged  Taken 1/15/2025 1000 by Jacquelyn Dyson RN  Sleep/Rest Enhancement:   awakenings minimized   comfort measures   consistent schedule promoted   medication   natural light exposure provided   relaxation techniques promoted   regular sleep/rest pattern promoted   visualization  Taken 1/15/2025 0800 by Jacquelyn Dyson RN  Sleep/Rest Enhancement:   awakenings minimized   comfort measures   consistent schedule promoted    medication   natural light exposure provided   relaxation techniques promoted   regular sleep/rest pattern promoted   visualization  Medication Review/Management: medications reviewed  Environmental Support:   calm environment promoted   distractions minimized   environmental consistency promoted   personal routine supported   rest periods encouraged  Goal: Optimal Nutrition Delivery  Outcome: Progressing  Intervention: Optimize Nutrition Delivery  Recent Flowsheet Documentation  Taken 1/15/2025 1600 by Jacquelyn Dyson RN  Nutrition Support Management: weight trending reviewed  Taken 1/15/2025 1200 by Jacquelyn Dyson RN  Nutrition Support Management: weight trending reviewed  Taken 1/15/2025 0800 by Jacquelyn Dyson RN  Nutrition Support Management: weight trending reviewed  Goal: Absence of Device-Related Skin and Tissue Injury  Outcome: Progressing  Intervention: Maintain Skin and Tissue Health  Recent Flowsheet Documentation  Taken 1/15/2025 1600 by Jacquelyn Dyson RN  Device Skin Pressure Protection: tubing/devices free from skin contact  Taken 1/15/2025 1200 by Jacquelyn Dyson RN  Device Skin Pressure Protection: tubing/devices free from skin contact  Taken 1/15/2025 0800 by Jacquelyn Dyson RN  Device Skin Pressure Protection: tubing/devices free from skin contact  Goal: Absence of Ventilator-Induced Lung Injury  Outcome: Progressing  Intervention: Facilitate Lung-Protection Measures  Recent Flowsheet Documentation  Taken 1/15/2025 1600 by Jacquelyn Dyson RN  Lung Protection Measures:   optimal PEEP applied   ventilator synchrony promoted   ventilator waveforms monitored   lung compliance monitored  Taken 1/15/2025 1200 by Jacquelyn Dyson RN  Lung Protection Measures:   optimal PEEP applied   ventilator synchrony promoted   ventilator waveforms monitored   lung compliance monitored  Taken 1/15/2025 0800 by Jacquelyn Dyson RN  Lung Protection Measures:   optimal PEEP applied   ventilator  synchrony promoted   ventilator waveforms monitored   lung compliance monitored  Intervention: Prevent Ventilator-Associated Pneumonia  Recent Flowsheet Documentation  Taken 1/15/2025 1800 by Jacquelyn Dyson RN  Oral Care:   swabbed with antiseptic solution   suction provided  Head of Bed (HOB) Positioning: HOB at 30 degrees  VAP Prevention Measures: completed  Taken 1/15/2025 1600 by Jacquelyn Dyson RN  VAP Prevention Bundle:   oral care regularly provided   readiness to extubate assessed   HOB elevation maintained   sedation interruption performed   spontaneous breathing trial performed   stress ulcer prophylaxis provided   vent circuit breaks minimized  Oral Care:   swabbed with antiseptic solution   suction provided  Head of Bed (HOB) Positioning: HOB at 30 degrees  VAP Prevention Contraindications: per provider order  VAP Prevention Measures: completed  Taken 1/15/2025 1400 by Jacquelyn Dyson RN  Oral Care:   swabbed with antiseptic solution   suction provided  Head of Bed (HOB) Positioning: HOB at 30 degrees  VAP Prevention Measures: completed  Taken 1/15/2025 1200 by Jacquelyn Dyson RN  VAP Prevention Bundle:   oral care regularly provided   readiness to extubate assessed   HOB elevation maintained   sedation interruption performed   spontaneous breathing trial performed   stress ulcer prophylaxis provided   vent circuit breaks minimized  Oral Care:   swabbed with antiseptic solution   suction provided  Head of Bed (HOB) Positioning: HOB at 30 degrees  VAP Prevention Contraindications: per provider order  VAP Prevention Measures: completed  Taken 1/15/2025 1000 by Jacquelyn Dyson RN  Oral Care:   swabbed with antiseptic solution   suction provided  Head of Bed (HOB) Positioning: HOB at 30 degrees  VAP Prevention Measures: completed  Taken 1/15/2025 0800 by Jacquelyn Dyson RN  VAP Prevention Bundle:   oral care regularly provided   readiness to extubate assessed   HOB elevation maintained    sedation interruption performed   spontaneous breathing trial performed   stress ulcer prophylaxis provided   vent circuit breaks minimized  Oral Care:   swabbed with antiseptic solution   suction provided  Head of Bed (HOB) Positioning: HOB at 30 degrees  VAP Prevention Contraindications: per provider order  VAP Prevention Measures: completed

## 2025-01-16 NOTE — PROGRESS NOTES
Renal Medicine Progress Note            Assessment/Plan:     Supriya Herr is a 74 year old female with ESRD who was admitted on 1/8/2025.      1) Influenza A with hypoxic resp failure.   On oseltamivir at ESRD dose.   Mechanically ventilated  Weights significant above target weight although exam does not match the degree of potential hypervolemia      2) end-stage renal disease   Chronic dialysis at [facility], Tuesday Thursday Saturday   Primary nephrologist Dr. Basilio  Access: Right AV fistula,   Run time 3.5 hours as outpatient     3) Anemia: HGB today 8.6, stable.  She is on Mircera as outpt.  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  Vit D 2000 units daily.  Renvela 1600 mg TID c meals and 800 mg with snacks.       Plan:  1) planning on dialysis today  2) attempt additional ultrafiltration due to increasing weight, positive I's and O's      Aime Langley DO  [facility]  Office: [phone]  Cell: [phone]        Interval History:     Patient remains intubated.  30% FiO2.  +2.5 L on inputs yesterday, no outputs recorded.  Today weight increased to 1 1.8 kg.    Hypertensive overnight, this morning normotensive.  Remains on amiodarone drip.          Medications and Allergies:     Current Facility-Administered Medications   Medication Dose Route Frequency Provider Last Rate Last Admin    - MEDICATION INSTRUCTIONS for Dialysis Patients -   Does not apply See Admin Instructions Dusty Prieto MD        0.9% Sodium Chloride for DIALYSIS Catheter LOCK - BLUE lumen  10 mL Intracatheter During Dialysis/CRRT (from stock) Aime Langley DO        0.9% Sodium Chloride for DIALYSIS Catheter LOCK - RED lumen  10 mL Intracatheter During Dialysis/CRRT (from stock) Aime Langley DO        [Held by provider] amLODIPine (NORVASC) tablet 5 mg  5 mg Oral BID Marcell Grubbs PA-C   5 mg  at 01/08/25 2217    atorvastatin (LIPITOR) tablet 20 mg  20 mg Oral or Feeding Tube QPM Kamryn Peguero MD   20 mg at 01/15/25 2011    B and C vitamin Complex with folic acid (NEPHRONEX) liquid 5 mL  5 mL Per Feeding Tube Daily Truman Moreland MD   5 mL at 01/16/25 0822    budesonide (PULMICORT) neb solution 1 mg  1 mg Nebulization BID Cheo Watt MD   1 mg at 01/16/25 1036    [Held by provider] carvedilol (COREG) tablet 25 mg  25 mg Oral BID w/meals Marcell Grubbs PA-C   25 mg at 01/08/25 1905    cetirizine (zyrTEC) tablet 5 mg  5 mg Oral or Feeding Tube At Bedtime Kamryn Peguero MD   5 mg at 01/15/25 2203    chlorhexidine (PERIDEX) 0.12 % solution 15 mL  15 mL Mouth/Throat Q12H Truman Moreland MD   15 mL at 01/16/25 0821    epoetin candy-epbx (RETACRIT) injection 10,000 Units  10,000 Units Intravenous Once in dialysis/CRRT Aime Langley,         [Held by provider] furosemide (LASIX) tablet 40 mg  40 mg Oral Once per day on Tuesday Thursday Saturday Marcell Grubbs PA-C        And    [Held by provider] furosemide (LASIX) tablet 80 mg  80 mg Oral Once per day on Tuesday Thursday Saturday Marcell Grubbs PA-C        furosemide (LASIX) tablet 80 mg  80 mg Oral or NG Tube BID Cheo Watt MD   80 mg at 01/16/25 0821    gabapentin (NEURONTIN) solution 100 mg  100 mg Oral or Feeding Tube Daily Kamryn Peguero MD   100 mg at 01/16/25 0822    heparin ANTICOAGULANT injection 5,000 Units  5,000 Units Subcutaneous Q8H Marcell Grubbs PA-C   5,000 Units at 01/16/25 0603    insulin aspart (NovoLOG) injection (RAPID ACTING)  1-7 Units Subcutaneous Q4H John Renner MD   5 Units at 01/16/25 1134    insulin glargine (LANTUS PEN) injection 12 Units  12 Units Subcutaneous Daily Cheo Watt MD   12 Units at 01/16/25 0827    ipratropium - albuterol 0.5 mg/2.5 mg/3 mL (DUONEB) neb solution 3 mL  3 mL Nebulization 4x daily Marcell Grubbs, ILA   3 mL at 01/16/25 103     "methylPREDNISolone sodium succinate (SOLU-MEDROL) injection 40 mg  40 mg Intravenous Q8H Cheo Watt MD   40 mg at 01/16/25 1134    No heparin via hemodialysis machine   Does not apply Once Aime Langley DO        oseltamivir (TAMIFLU) suspension 30 mg  30 mg Oral or Feeding Tube Q48H John Renner MD   30 mg at 01/15/25 1415    pantoprazole (PROTONIX) 2 mg/mL suspension 40 mg  40 mg Per Feeding Tube QAM  Truman Moreland MD   40 mg at 01/16/25 0821    Or    pantoprazole (PROTONIX) IV push injection 40 mg  [facility]        sertraline (ZOLOFT) tablet 75 mg  75 mg Oral or Feeding Tube QPM Kamryn Peguero MD   75 mg at 01/15/25 2011    sevelamer carbonate (RENVELA) tablet 800 mg  800 mg Oral or Feeding Tube TID w/meals Kamryn Peguero MD   800 mg at 01/16/25 1134    sodium chloride (PF) 0.9% PF flush 3 mL  3 mL Intracatheter Q8H Marcell Grubbs PA-C   3 mL at 01/15/25 1155    sodium chloride 0.9% BOLUS 200 mL  200 mL Hemodialysis Machine Once Aime Langley DO        sodium chloride 0.9% BOLUS 250 mL  250 mL Intravenous Once in dialysis/CRRT Aime Langley DO        sodium chloride 0.9% BOLUS 500 mL  500 mL Hemodialysis Machine Once Aime Langley DO        tacrolimus (PROTOPIC) 0.1 % ointment   Topical BID Marcell Grubbs PA-C   Given at 01/16/25 0821    vitamin D3 (CHOLECALCIFEROL) tablet 50 mcg  50 mcg Oral or Feeding Tube Daily Kamryn Peguero MD   50 mcg at 01/16/25 0821        Allergies   Allergen Reactions    Ampicillin-Sulbactam Sodium Rash     No evidence SJS, but very uncomfortable and precipitated multiple provider visits. Would not use penicillins again if other options available.     Penicillins Rash            Physical Exam:   Vitals were reviewed  /72 (BP Location: Right leg, Cuff Size: Adult Large)   Pulse 98   Temp 98  F (36.7  C) (Axillary)   Resp 12   Ht 1.702 m (5' 7\")   Wt 101.8 kg (224 lb 6.9 oz)   LMP  " (LMP Unknown)   SpO2 96%   BMI 35.15 kg/m      Wt Readings from Last 3 Encounters:   01/16/25 101.8 kg (224 lb 6.9 oz)   06/14/24 101.1 kg (222 lb 14.2 oz)   06/03/24 101.1 kg (222 lb 14.2 oz)       Intake/Output Summary (Last 24 hours) at 1/16/2025 1230  Last data filed at 1/16/2025 1000  Gross per 24 hour   Intake 1932.38 ml   Output --   Net 1932.38 ml       GENERAL APPEARANCE: intubated, sedated  HEENT:  Eyes/ears/nose/neck grossly normal  RESP: + vent sounds  CV: RRR, nl S1/S2, no m/r/g   ABDOMEN: o/s/nt/nd, bs present  EXTREMITIES/SKIN: no c/c/rashes/lesions; trace dependent edema  NEURO:  sedated              Data:     BMP  Recent Labs   Lab 01/16/25  1132 01/16/25  0819 01/16/25  0617 01/16/25  0411 01/15/25  0802 01/15/25  0507 01/14/25  0812 01/14/25  0451 01/13/25  0557 01/13/25  0514   NA  --   --  130*  --   --  133*  --  132*  --  135   POTASSIUM  --   --  5.3  --   --  4.7  --  4.0  --  4.7   CHLORIDE  --   --  89*  --   --  92*  --  93*  --  94*   JODY  --   --  10.6*  --   --  9.9  --  9.5  --  9.7   CO2  --   --  27  --   --  28  --  27  --  27   BUN  --   --  62.8*  --   --  26.7*  --  31.0*  --  59.2*   CR  --   --  3.48*  --   --  2.27*  --  3.23*  --  5.73*   * 351* 362* 343*   < > 280*   < > 137*   < > 169*    < > = values in this interval not displayed.     CBC  Recent Labs   Lab 01/16/25  0455 01/15/25  0507 01/14/25  0451 01/13/25  0514   WBC 8.5 6.2 5.0 4.6   HGB 8.6* 8.6* 8.0* 7.8*   HCT 27.1* 26.9* 25.0* 24.3*   MCV 96 96 96 94    182 188 165     Lab Results   Component Value Date    AST 90 (H) 01/16/2025     (H) 01/16/2025    ALKPHOS 95 01/16/2025    BILITOTAL 0.2 01/16/2025     Lab Results   Component Value Date    INR 1.11 04/24/2024       Attestation:  I have reviewed today's vital signs, notes, medications, labs and imaging.    Aime Langley DO  [facility] - Nephrology  Office: [phone]  Cell: [phone]

## 2025-01-16 NOTE — PROGRESS NOTES
Count includes the [facility] ICU RESPIRATORY NOTE        Date of Admission: 1/8/2025     Date of Intubation (most recent): 1/9/25    Reason for Mechanical Ventilation: Airway protection     Number of Days on Mechanical Ventilation: 8    Met Criteria for Spontaneous Breathing Trial: Yes      Bite Block: No    Significant Events Today: Trialed PS 10/5 however patient became tachypneic. Transport to CT done with no issues.     ABG Results:   Recent Labs   Lab 01/16/25  0901   O2PER 30         Current Vent Settings: FiO2 (%): 30 %, Resp: 17, Vent Mode: CMV/AC, Resp Rate (Set): 12 breaths/min, Tidal Volume (Set, mL): 470 mL, PEEP (cm H2O): 5 cmH2O, Pressure Support (cm H2O): 10 cmH2O, Resp Rate (Set): 12 breaths/min, Tidal Volume (Set, mL): 470 mL, PEEP (cm H2O): 5 cmH2O    Skin Assessment: Skin intact     Plan: Continue full vent support and wean as tolerated    RT Lissette on 1/16/2025 at 5:50 PM

## 2025-01-16 NOTE — PLAN OF CARE
[facility] Intensive Care Unit   Nursing Note                                                       Neuro:  PERRL.  Moves all extremities. Does not follow commands.  Cardiovascular:  IRRR.  Hemodynamically stable without pressors.  Pulmonary:  Tolerating ventilator Precedex.  Oxygen saturation > 92  GI/:  Anuric.  Endocrine: Glucose not well controlled with SSI.  Skin:  Intact except incisional areas.  Protective mepilex applied to sacrum.  Restraints:  In use and necessary.  AM labs noted; electrolytes replaced as indicated.  Family updated.  Continue to monitor closely.    Recent Labs   Lab 01/09/25  1540   O2PER 30       ROUTINE IP LABS (Last four results)  BMP  Recent Labs   Lab 01/16/25  0411 01/16/25  0037 01/15/25  2020 01/15/25  1522 01/15/25  0802 01/15/25  0507 01/14/25  0812 01/14/25  0451 01/13/25  0557 01/13/25  0514 01/12/25  0845 01/12/25  0621   NA  --   --   --   --   --  133*  --  132*  --  135  --  136   POTASSIUM  --   --   --   --   --  4.7  --  4.0  --  4.7  --  4.3   CHLORIDE  --   --   --   --   --  92*  --  93*  --  94*  --  96*   JODY  --   --   --   --   --  9.9  --  9.5  --  9.7  --  9.2   CO2  --   --   --   --   --  28  --  27  --  27  --  28   BUN  --   --   --   --   --  26.7*  --  31.0*  --  59.2*  --  44.5*   CR  --   --   --   --   --  2.27*  --  3.23*  --  5.73*  --  4.78*   * 376* 367* 280*   < > 280*   < > 137*   < > 169*   < > 183*    < > = values in this interval not displayed.     CBC  Recent Labs   Lab 01/16/25  0455 01/15/25  0507 01/14/25  0451 01/13/25  0514   WBC 8.5 6.2 5.0 4.6   RBC 2.82* 2.80* 2.61* 2.58*   HGB 8.6* 8.6* 8.0* 7.8*   HCT 27.1* 26.9* 25.0* 24.3*   MCV 96 96 96 94   MCH 30.5 30.7 30.7 30.2   MCHC 31.7 32.0 32.0 32.1   RDW 14.6 14.3 14.4 14.4    182 188 165         Intake/Output Summary (Last 24 hours) at 1/16/2025 0640  Last data filed at 1/16/2025 0500  Gross per 24 hour   Intake 2188.03 ml   Output --   Net 2188.03 ml        Santi Matos MSN RN PHN CCRN  [facility]  Intensive Care Unit            Goal Outcome Evaluation:       Problem: Adult Inpatient Plan of Care  Goal: Plan of Care Review  Description: The Plan of Care Review/Shift note should be completed every shift.  The Outcome Evaluation is a brief statement about your assessment that the patient is improving, declining, or no change.  This information will be displayed automatically on your shift  note.  Outcome: Not Progressing     Problem: Mechanical Ventilation Invasive  Goal: Effective Communication  Outcome: Not Progressing     Problem: Mechanical Ventilation Invasive  Goal: Mechanical Ventilation Liberation  Outcome: Not Progressing

## 2025-01-16 NOTE — PROGRESS NOTES
ICU Progress Note   Date of Service: 01/15/25    Assessment and Plan:  Supriya Herr is a 74 year old female with a history of ESRD on HD, CHF, COPD, DM type II, hypertension who was admitted 1/8 with influenza A. This morning during dialysis, an RRT was called for hypoxia and she was intubated. The patient was admitted to the ICU for medical management.      Interval events:   Had iHD last evening with 500 ml removed, and developed Afib RVR     Neuro:  # Sedation for mechanical ventilation  # Likely baseline cognitive impairment  - Propofol for sedation  - RASS goal of 0 to -1  - PTA Zoloft, gabapentin     CV:  # Shock, septic vs polypharmacy (resolved)  # Hx of CHF  # Hx of HTN, HLD  - MAP goal > 65, not requiring pressors  - Hold home amlodipine, carvedilol  - discontinue furosemide as she is not making urine     Pulm:  # Acute hypoxemic respiratory failure  # Flu A  # COPD exacerbation  # JONE  - low tidal volume ventilation  - BLE Doppler US negative 1/9  - S/p 5 days of abx coverage  - failed spontaneous breathing trial again today  - has active wheezing, will add methylpred steroids 40 q8 + nebs  - start BID pulmicort     GI:  - RD to manage TF     Renal:  # ESRD on HD   - Nephrology consult, appreciate recs   - Had iHD 1/13, plan again on 1/15     ID:  # Influenza A pneumonia  # ? Community acquired pneumonia   - sputum culture no growth   - Oseltamivir started on admission, continue   - S/p 5 days of cefepime       Heme:  #Chronic anemia   - Hgb baseline 9-10   - Transfuse for Hgb < 7     Endo:  # Hx of DMII   - resume glargine 12 units daily given hyperglycemia with steroids   - SSI     PPx:  1. DVT: heparin  2. VAP: HOB 30 degrees, chlorhexidine rinse  3. Stress Ulcer: PPI  4. Restraints: Nonviolent soft two point restraints required and necessary for patient safety and continued cares and good effect as patient continues to pull at necessary lines, tubes despite education and distraction. Will readdress  "daily.   5. Wound care - per unit routine   6. Feeding - TF  7. Family updated at the bedside (daughter)     Dispo: ICU      /65 (BP Location: Right leg, Cuff Size: Adult Large)   Pulse 115   Temp 97.9  F (36.6  C) (Axillary)   Resp 15   Ht 1.702 m (5' 7\")   Wt 99.4 kg (219 lb 2.2 oz)   LMP  (LMP Unknown)   SpO2 97%   BMI 34.32 kg/m      FiO2 (%): 30 %, Resp: 15, Vent Mode: CMV/AC (PS attempted at this time, will reattempt again later), Resp Rate (Set): 12 breaths/min, Tidal Volume (Set, mL): 480 mL, PEEP (cm H2O): 5 cmH2O, Pressure Support (cm H2O): 5 cmH2O, Resp Rate (Set): 12 breaths/min, Tidal Volume (Set, mL): 480 mL, PEEP (cm H2O): 5 cmH2O      Intake/Output Summary (Last 24 hours) at 1/15/2025 1908  Last data filed at 1/15/2025 1800  Gross per 24 hour   Intake 2540.6 ml   Output 500 ml   Net 2040.6 ml       Physical Exam:  Gen: Laying in bed in NAD  HEENT: NC AT  Resp: intubated, B/L air entry, no wheezing   CVS: S1 S2, regular rhythm on tele  Abd: Soft NT ND  Ext: no swelling noted, left BKA  Neuro: sedated.    Labs: reviewed    Imaging: reviewed    Past Medical/Surgical history     Family history     Social history     Time Spent on this Encounter   Supriya was seen and evaluated by me on 01/15/25.  She was in critical condition as the result of acute hypoxic resp failure.    Her condition is now Fair.      The acute issues managed by me today include acute hypoxic respiratory failure, Flu, ESRD on HD, and DM   Supportive interventions provided and/or ordered by me include mechanical ventilation, HD, oseltamivir, and monitoring.    Total Critical Care time spent by me, excluding procedures, was 45 minutes.    Cheo Watt MD   "

## 2025-01-16 NOTE — PLAN OF CARE
Goal Outcome Evaluation:      Plan of Care Reviewed With: patient, child    Overall Patient Progress: improvingOverall Patient Progress: improving    Outcome Evaluation: Patient continues to be intubated. Maintaining a RASS goal (0)-(-1). Precedex given during coughing episodes that do not resolve. Pt opens eyes spontaneously, squeezes hands equal bilaterally and will wiggle LLE foot to command. AFIB RVR/CVR- heparin gtt started and Amio gtt discontinued. MAP maintained>65. Vent CMV/AC 30% PEEP 5- attempted to PS but increased RR, MD notified and CT PE ordered and completed. OG w/ TF to goal. Anuric- HD completed today, see note. Daughter Caroline Gray updated at bedside.    Problem: Mechanical Ventilation Invasive  Goal: Mechanical Ventilation Liberation  Outcome: Not Progressing  Intervention: Promote Extubation and Mechanical Ventilation Liberation  Recent Flowsheet Documentation  Taken 1/16/2025 1600 by Jacquelyn Dyson RN  Sleep/Rest Enhancement:   awakenings minimized   comfort measures   consistent schedule promoted   medication   natural light exposure provided   relaxation techniques promoted   regular sleep/rest pattern promoted   visualization  Medication Review/Management: medications reviewed  Environmental Support:   calm environment promoted   distractions minimized   environmental consistency promoted   personal routine supported   rest periods encouraged  Taken 1/16/2025 1400 by Jacquelyn Dyson RN  Sleep/Rest Enhancement:   awakenings minimized   comfort measures   consistent schedule promoted   medication   natural light exposure provided   relaxation techniques promoted   regular sleep/rest pattern promoted   visualization  Taken 1/16/2025 1200 by Jacquelyn Dyson RN  Sleep/Rest Enhancement:   awakenings minimized   comfort measures   consistent schedule promoted   medication   natural light exposure provided   relaxation techniques promoted   regular sleep/rest pattern promoted    visualization  Medication Review/Management: medications reviewed  Environmental Support:   calm environment promoted   distractions minimized   environmental consistency promoted   personal routine supported   rest periods encouraged  Taken 1/16/2025 1000 by Jacquelyn Dyson, RN  Sleep/Rest Enhancement:   awakenings minimized   comfort measures   consistent schedule promoted   medication   natural light exposure provided   relaxation techniques promoted   regular sleep/rest pattern promoted   visualization  Taken 1/16/2025 0800 by Jacquelyn Dyson, RN  Sleep/Rest Enhancement:   awakenings minimized   comfort measures   consistent schedule promoted   medication   natural light exposure provided   relaxation techniques promoted   regular sleep/rest pattern promoted   visualization  Medication Review/Management: medications reviewed  Environmental Support:   calm environment promoted   distractions minimized   environmental consistency promoted   personal routine supported   rest periods encouraged

## 2025-01-17 LAB
ANION GAP SERPL CALCULATED.3IONS-SCNC: 12 MMOL/L (ref 7–15)
BASE EXCESS BLDV CALC-SCNC: 5.2 MMOL/L (ref -3–3)
BUN SERPL-MCNC: 51.1 MG/DL (ref 8–23)
CALCIUM SERPL-MCNC: 10.3 MG/DL (ref 8.8–10.4)
CHLORIDE SERPL-SCNC: 90 MMOL/L (ref 98–107)
CREAT SERPL-MCNC: 2.77 MG/DL (ref 0.51–0.95)
EGFRCR SERPLBLD CKD-EPI 2021: 17 ML/MIN/1.73M2
ERYTHROCYTE [DISTWIDTH] IN BLOOD BY AUTOMATED COUNT: 15 % (ref 10–15)
EST. AVERAGE GLUCOSE BLD GHB EST-MCNC: 140 MG/DL
GLUCOSE BLDC GLUCOMTR-MCNC: 215 MG/DL (ref 70–99)
GLUCOSE BLDC GLUCOMTR-MCNC: 273 MG/DL (ref 70–99)
GLUCOSE BLDC GLUCOMTR-MCNC: 300 MG/DL (ref 70–99)
GLUCOSE BLDC GLUCOMTR-MCNC: 314 MG/DL (ref 70–99)
GLUCOSE BLDC GLUCOMTR-MCNC: 325 MG/DL (ref 70–99)
GLUCOSE BLDC GLUCOMTR-MCNC: 333 MG/DL (ref 70–99)
GLUCOSE BLDC GLUCOMTR-MCNC: 344 MG/DL (ref 70–99)
GLUCOSE BLDC GLUCOMTR-MCNC: 346 MG/DL (ref 70–99)
GLUCOSE BLDC GLUCOMTR-MCNC: 349 MG/DL (ref 70–99)
GLUCOSE BLDC GLUCOMTR-MCNC: 391 MG/DL (ref 70–99)
GLUCOSE BLDC GLUCOMTR-MCNC: 404 MG/DL (ref 70–99)
GLUCOSE SERPL-MCNC: 323 MG/DL (ref 70–99)
HBA1C MFR BLD: 6.5 %
HCO3 BLDV-SCNC: 31 MMOL/L (ref 21–28)
HCO3 SERPL-SCNC: 29 MMOL/L (ref 22–29)
HCT VFR BLD AUTO: 28.5 % (ref 35–47)
HGB BLD-MCNC: 9 G/DL (ref 11.7–15.7)
MCH RBC QN AUTO: 30.8 PG (ref 26.5–33)
MCHC RBC AUTO-ENTMCNC: 31.6 G/DL (ref 31.5–36.5)
MCV RBC AUTO: 98 FL (ref 78–100)
O2/TOTAL GAS SETTING VFR VENT: 30 %
OXYHGB MFR BLDV: 77 % (ref 70–75)
PCO2 BLDV: 49 MM HG (ref 40–50)
PH BLDV: 7.41 [PH] (ref 7.32–7.43)
PLATELET # BLD AUTO: 254 10E3/UL (ref 150–450)
PO2 BLDV: 48 MM HG (ref 25–47)
POTASSIUM SERPL-SCNC: 4.9 MMOL/L (ref 3.4–5.3)
RBC # BLD AUTO: 2.92 10E6/UL (ref 3.8–5.2)
SAO2 % BLDV: 79.2 % (ref 70–75)
SODIUM SERPL-SCNC: 131 MMOL/L (ref 135–145)
UFH PPP CHRO-ACNC: 0.24 IU/ML
UFH PPP CHRO-ACNC: 0.33 IU/ML
UFH PPP CHRO-ACNC: 0.46 IU/ML
WBC # BLD AUTO: 9.9 10E3/UL (ref 4–11)

## 2025-01-17 PROCEDURE — 94640 AIRWAY INHALATION TREATMENT: CPT | Mod: 76

## 2025-01-17 PROCEDURE — 82435 ASSAY OF BLOOD CHLORIDE: CPT | Performed by: STUDENT IN AN ORGANIZED HEALTH CARE EDUCATION/TRAINING PROGRAM

## 2025-01-17 PROCEDURE — 250N000013 HC RX MED GY IP 250 OP 250 PS 637: Performed by: INTERNAL MEDICINE

## 2025-01-17 PROCEDURE — 82805 BLOOD GASES W/O2 SATURATION: CPT | Performed by: INTERNAL MEDICINE

## 2025-01-17 PROCEDURE — 36415 COLL VENOUS BLD VENIPUNCTURE: CPT | Performed by: INTERNAL MEDICINE

## 2025-01-17 PROCEDURE — 250N000013 HC RX MED GY IP 250 OP 250 PS 637: Performed by: STUDENT IN AN ORGANIZED HEALTH CARE EDUCATION/TRAINING PROGRAM

## 2025-01-17 PROCEDURE — 85520 HEPARIN ASSAY: CPT | Performed by: INTERNAL MEDICINE

## 2025-01-17 PROCEDURE — 250N000009 HC RX 250: Performed by: INTERNAL MEDICINE

## 2025-01-17 PROCEDURE — 83036 HEMOGLOBIN GLYCOSYLATED A1C: CPT | Performed by: INTERNAL MEDICINE

## 2025-01-17 PROCEDURE — 200N000001 HC R&B ICU

## 2025-01-17 PROCEDURE — 99291 CRITICAL CARE FIRST HOUR: CPT | Performed by: INTERNAL MEDICINE

## 2025-01-17 PROCEDURE — 94640 AIRWAY INHALATION TREATMENT: CPT

## 2025-01-17 PROCEDURE — 250N000009 HC RX 250: Performed by: STUDENT IN AN ORGANIZED HEALTH CARE EDUCATION/TRAINING PROGRAM

## 2025-01-17 PROCEDURE — 999N000157 HC STATISTIC RCP TIME EA 10 MIN

## 2025-01-17 PROCEDURE — 85027 COMPLETE CBC AUTOMATED: CPT | Performed by: STUDENT IN AN ORGANIZED HEALTH CARE EDUCATION/TRAINING PROGRAM

## 2025-01-17 PROCEDURE — 80048 BASIC METABOLIC PNL TOTAL CA: CPT | Performed by: STUDENT IN AN ORGANIZED HEALTH CARE EDUCATION/TRAINING PROGRAM

## 2025-01-17 PROCEDURE — 94003 VENT MGMT INPAT SUBQ DAY: CPT

## 2025-01-17 PROCEDURE — 250N000011 HC RX IP 250 OP 636: Performed by: INTERNAL MEDICINE

## 2025-01-17 PROCEDURE — 99232 SBSQ HOSP IP/OBS MODERATE 35: CPT | Performed by: INTERNAL MEDICINE

## 2025-01-17 PROCEDURE — 82565 ASSAY OF CREATININE: CPT | Performed by: STUDENT IN AN ORGANIZED HEALTH CARE EDUCATION/TRAINING PROGRAM

## 2025-01-17 PROCEDURE — 999N000009 HC STATISTIC AIRWAY CARE

## 2025-01-17 RX ORDER — NICOTINE POLACRILEX 4 MG
15-30 LOZENGE BUCCAL
Status: DISCONTINUED | OUTPATIENT
Start: 2025-01-17 | End: 2025-01-20

## 2025-01-17 RX ORDER — OSELTAMIVIR PHOSPHATE 6 MG/ML
30 FOR SUSPENSION ORAL ONCE
Status: COMPLETED | OUTPATIENT
Start: 2025-01-17 | End: 2025-01-17

## 2025-01-17 RX ORDER — DEXTROSE MONOHYDRATE 100 MG/ML
INJECTION, SOLUTION INTRAVENOUS CONTINUOUS PRN
Status: DISCONTINUED | OUTPATIENT
Start: 2025-01-17 | End: 2025-01-20

## 2025-01-17 RX ORDER — DEXTROSE MONOHYDRATE 25 G/50ML
25-50 INJECTION, SOLUTION INTRAVENOUS
Status: DISCONTINUED | OUTPATIENT
Start: 2025-01-17 | End: 2025-01-20

## 2025-01-17 RX ORDER — METHYLPREDNISOLONE SODIUM SUCCINATE 40 MG/ML
40 INJECTION INTRAMUSCULAR; INTRAVENOUS EVERY 12 HOURS
Status: DISCONTINUED | OUTPATIENT
Start: 2025-01-18 | End: 2025-01-19

## 2025-01-17 RX ADMIN — SEVELAMER CARBONATE 800 MG: 800 TABLET, FILM COATED ORAL at 17:00

## 2025-01-17 RX ADMIN — CHLORHEXIDINE GLUCONATE 15 ML: 1.2 RINSE ORAL at 08:08

## 2025-01-17 RX ADMIN — CETIRIZINE HYDROCHLORIDE 5 MG: 5 TABLET ORAL at 21:58

## 2025-01-17 RX ADMIN — OSELTAMIVIR PHOSPHATE 30 MG: 6 POWDER, FOR SUSPENSION ORAL at 18:57

## 2025-01-17 RX ADMIN — DEXMEDETOMIDINE HYDROCHLORIDE 0.2 MCG/KG/HR: 400 INJECTION INTRAVENOUS at 10:09

## 2025-01-17 RX ADMIN — PANTOPRAZOLE SODIUM 40 MG: 40 INJECTION, POWDER, FOR SOLUTION INTRAVENOUS at 08:07

## 2025-01-17 RX ADMIN — IPRATROPIUM BROMIDE AND ALBUTEROL SULFATE 3 ML: .5; 3 SOLUTION RESPIRATORY (INHALATION) at 15:57

## 2025-01-17 RX ADMIN — METHYLPREDNISOLONE SODIUM SUCCINATE 40 MG: 40 INJECTION, POWDER, FOR SOLUTION INTRAMUSCULAR; INTRAVENOUS at 04:22

## 2025-01-17 RX ADMIN — GABAPENTIN 100 MG: 250 SOLUTION ORAL at 08:08

## 2025-01-17 RX ADMIN — AMIODARONE HYDROCHLORIDE 400 MG: 200 TABLET ORAL at 08:08

## 2025-01-17 RX ADMIN — ATORVASTATIN CALCIUM 20 MG: 10 TABLET, FILM COATED ORAL at 19:30

## 2025-01-17 RX ADMIN — Medication 50 MCG: at 08:07

## 2025-01-17 RX ADMIN — METHYLPREDNISOLONE SODIUM SUCCINATE 40 MG: 40 INJECTION, POWDER, FOR SOLUTION INTRAMUSCULAR; INTRAVENOUS at 12:29

## 2025-01-17 RX ADMIN — CHLORHEXIDINE GLUCONATE 15 ML: 1.2 RINSE ORAL at 19:30

## 2025-01-17 RX ADMIN — BUDESONIDE 1 MG: 1 INHALANT ORAL at 10:57

## 2025-01-17 RX ADMIN — BUDESONIDE 1 MG: 1 INHALANT ORAL at 19:44

## 2025-01-17 RX ADMIN — FUROSEMIDE 80 MG: 20 TABLET ORAL at 08:08

## 2025-01-17 RX ADMIN — SERTRALINE HYDROCHLORIDE 75 MG: 50 TABLET ORAL at 19:30

## 2025-01-17 RX ADMIN — FENTANYL CITRATE 25 MCG: 50 INJECTION, SOLUTION INTRAMUSCULAR; INTRAVENOUS at 01:47

## 2025-01-17 RX ADMIN — Medication 5 ML: at 08:08

## 2025-01-17 RX ADMIN — TACROLIMUS: 1 OINTMENT TOPICAL at 19:30

## 2025-01-17 RX ADMIN — TACROLIMUS: 1 OINTMENT TOPICAL at 08:32

## 2025-01-17 RX ADMIN — FUROSEMIDE 80 MG: 20 TABLET ORAL at 16:52

## 2025-01-17 RX ADMIN — IPRATROPIUM BROMIDE AND ALBUTEROL SULFATE 3 ML: .5; 3 SOLUTION RESPIRATORY (INHALATION) at 19:44

## 2025-01-17 RX ADMIN — INSULIN HUMAN 5.5 UNITS/HR: 1 INJECTION, SOLUTION INTRAVENOUS at 18:09

## 2025-01-17 RX ADMIN — SEVELAMER CARBONATE 800 MG: 800 TABLET, FILM COATED ORAL at 12:30

## 2025-01-17 RX ADMIN — IPRATROPIUM BROMIDE AND ALBUTEROL SULFATE 3 ML: .5; 3 SOLUTION RESPIRATORY (INHALATION) at 10:57

## 2025-01-17 RX ADMIN — IPRATROPIUM BROMIDE AND ALBUTEROL SULFATE 3 ML: .5; 3 SOLUTION RESPIRATORY (INHALATION) at 07:21

## 2025-01-17 RX ADMIN — SEVELAMER CARBONATE 800 MG: 800 TABLET, FILM COATED ORAL at 08:07

## 2025-01-17 RX ADMIN — AMIODARONE HYDROCHLORIDE 400 MG: 200 TABLET ORAL at 21:58

## 2025-01-17 RX ADMIN — HEPARIN SODIUM 1000 UNITS/HR: 10000 INJECTION, SOLUTION INTRAVENOUS at 08:04

## 2025-01-17 ASSESSMENT — ACTIVITIES OF DAILY LIVING (ADL)
ADLS_ACUITY_SCORE: 87
ADLS_ACUITY_SCORE: 87
ADLS_ACUITY_SCORE: 71
ADLS_ACUITY_SCORE: 71
ADLS_ACUITY_SCORE: 87
ADLS_ACUITY_SCORE: 87
ADLS_ACUITY_SCORE: 71
ADLS_ACUITY_SCORE: 87
ADLS_ACUITY_SCORE: 79
ADLS_ACUITY_SCORE: 71
ADLS_ACUITY_SCORE: 71
ADLS_ACUITY_SCORE: 87
ADLS_ACUITY_SCORE: 87
ADLS_ACUITY_SCORE: 71
ADLS_ACUITY_SCORE: 71
ADLS_ACUITY_SCORE: 87
ADLS_ACUITY_SCORE: 87
ADLS_ACUITY_SCORE: 71

## 2025-01-17 NOTE — PLAN OF CARE
Goal Outcome Evaluation:      Plan of Care Reviewed With: patient    Outcome Evaluation: Intubated, sedated on Dex, follows commands, Afib RVR, BP WNL, elevated at times. On full vent support, has intermittent cough episodes, TF at 55 ml/hr, anuric on HD. Bilateral soft wrist restraints. Plan to wean vent and sedation.      Problem: Mechanical Ventilation Invasive  Goal: Mechanical Ventilation Liberation  Outcome: Not Progressing  Intervention: Promote Extubation and Mechanical Ventilation Liberation  Recent Flowsheet Documentation  Taken 1/17/2025 0400 by Gisselle Villasenor, RN  Sleep/Rest Enhancement:   awakenings minimized   comfort measures   consistent schedule promoted   medication   natural light exposure provided   relaxation techniques promoted   regular sleep/rest pattern promoted   visualization  Medication Review/Management: medications reviewed  Environmental Support:   calm environment promoted   distractions minimized   environmental consistency promoted   personal routine supported   rest periods encouraged  Taken 1/17/2025 0000 by Gisselle Villasenor, RN  Sleep/Rest Enhancement:   awakenings minimized   comfort measures   consistent schedule promoted   medication   natural light exposure provided   relaxation techniques promoted   regular sleep/rest pattern promoted   visualization  Medication Review/Management: medications reviewed  Environmental Support:   calm environment promoted   distractions minimized   environmental consistency promoted   personal routine supported   rest periods encouraged  Taken 1/16/2025 2000 by Gisselle Villasenor, RN  Sleep/Rest Enhancement:   awakenings minimized   comfort measures   consistent schedule promoted   medication   natural light exposure provided   relaxation techniques promoted   regular sleep/rest pattern promoted   visualization  Medication Review/Management: medications reviewed  Environmental Support:   calm environment promoted   distractions minimized    environmental consistency promoted   personal routine supported   rest periods encouraged     Problem: Risk for Delirium  Goal: Optimal Coping  Intervention: Optimize Psychosocial Adjustment to Delirium  Recent Flowsheet Documentation  Taken 1/17/2025 0400 by Gisselle Villasenor RN  Supportive Measures: positive reinforcement provided  Taken 1/17/2025 0000 by Gisselle Villasenor RN  Supportive Measures: positive reinforcement provided  Taken 1/16/2025 2000 by Gisselle Villasenor RN  Supportive Measures: positive reinforcement provided  Family/Support System Care: support provided  Goal: Improved Behavioral Control  Intervention: Prevent and Manage Agitation  Recent Flowsheet Documentation  Taken 1/17/2025 0400 by Gisselle Villasenor RN  Complementary Therapy:   music therapy provided   essential oils utilized   aromatherapy utilized  Environment Familiarity/Consistency: daily routine followed  Taken 1/17/2025 0000 by Gisselle Villasenor RN  Complementary Therapy:   music therapy provided   essential oils utilized   aromatherapy utilized  Environment Familiarity/Consistency: daily routine followed  Taken 1/16/2025 2000 by Gisselle Villasenor RN  Complementary Therapy:   music therapy provided   essential oils utilized   aromatherapy utilized  Environment Familiarity/Consistency: daily routine followed  Intervention: Minimize Safety Risk  Recent Flowsheet Documentation  Taken 1/17/2025 0400 by Gisselle Villasenor RN  Enhanced Safety Measures:   room near unit station   review medications for side effects with activity   patient/family teach back on injury risk  Trust Relationship/Rapport:   care explained   thoughts/feelings acknowledged   reassurance provided  Taken 1/17/2025 0000 by Gisselle Villasenor RN  Enhanced Safety Measures:   room near unit station   review medications for side effects with activity   patient/family teach back on injury risk  Trust Relationship/Rapport:   care explained   thoughts/feelings acknowledged    reassurance provided  Taken 1/16/2025 2000 by Gisselle Villasenor RN  Enhanced Safety Measures:   room near unit station   review medications for side effects with activity   patient/family teach back on injury risk  Trust Relationship/Rapport:   care explained   thoughts/feelings acknowledged   reassurance provided  Goal: Improved Attention and Thought Clarity  Intervention: Maximize Cognitive Function  Recent Flowsheet Documentation  Taken 1/17/2025 0400 by Gisselle Villasenor RN  Sensory Stimulation Regulation:   care clustered   quiet environment promoted  Reorientation Measures:   reorientation provided   calendar in view   clock in view   familiar social contact encouraged  Taken 1/17/2025 0000 by Gisselle Villasenor RN  Sensory Stimulation Regulation:   care clustered   quiet environment promoted  Reorientation Measures:   reorientation provided   calendar in view   clock in view   familiar social contact encouraged  Taken 1/16/2025 2000 by Gisselle Villasenor RN  Sensory Stimulation Regulation:   care clustered   quiet environment promoted  Reorientation Measures:   reorientation provided   calendar in view   clock in view   familiar social contact encouraged  Goal: Improved Sleep  Intervention: Promote Sleep  Recent Flowsheet Documentation  Taken 1/17/2025 0400 by Gisselle Villasenor RN  Sleep/Rest Enhancement:   awakenings minimized   comfort measures   consistent schedule promoted   medication   natural light exposure provided   relaxation techniques promoted   regular sleep/rest pattern promoted   visualization  Taken 1/17/2025 0000 by Gisselle Villasenor RN  Sleep/Rest Enhancement:   awakenings minimized   comfort measures   consistent schedule promoted   medication   natural light exposure provided   relaxation techniques promoted   regular sleep/rest pattern promoted   visualization  Taken 1/16/2025 2000 by Gisselle Villasenor RN  Sleep/Rest Enhancement:   awakenings minimized   comfort measures   consistent schedule  promoted   medication   natural light exposure provided   relaxation techniques promoted   regular sleep/rest pattern promoted   visualization     Problem: Pain Acute  Goal: Optimal Pain Control and Function  Outcome: Progressing  Intervention: Optimize Psychosocial Wellbeing  Recent Flowsheet Documentation  Taken 1/17/2025 0400 by Gisselle Villasenor RN  Supportive Measures: positive reinforcement provided  Taken 1/17/2025 0000 by Gisselle Villasenor RN  Supportive Measures: positive reinforcement provided  Taken 1/16/2025 2000 by Gisselle Villasenor RN  Supportive Measures: positive reinforcement provided  Intervention: Develop Pain Management Plan  Recent Flowsheet Documentation  Taken 1/16/2025 2000 by Gisselle Villasenor RN  Pain Management Interventions:   rest   repositioned  Intervention: Prevent or Manage Pain  Recent Flowsheet Documentation  Taken 1/17/2025 0400 by Gisselle Villasenor RN  Sensory Stimulation Regulation:   care clustered   quiet environment promoted  Sleep/Rest Enhancement:   awakenings minimized   comfort measures   consistent schedule promoted   medication   natural light exposure provided   relaxation techniques promoted   regular sleep/rest pattern promoted   visualization  Complementary Therapy:   music therapy provided   essential oils utilized   aromatherapy utilized  Medication Review/Management: medications reviewed  Taken 1/17/2025 0000 by Gisselle Villasenor RN  Sensory Stimulation Regulation:   care clustered   quiet environment promoted  Sleep/Rest Enhancement:   awakenings minimized   comfort measures   consistent schedule promoted   medication   natural light exposure provided   relaxation techniques promoted   regular sleep/rest pattern promoted   visualization  Complementary Therapy:   music therapy provided   essential oils utilized   aromatherapy utilized  Medication Review/Management: medications reviewed  Taken 1/16/2025 2000 by Gisselle Villasenor RN  Sensory Stimulation Regulation:   care  clustered   quiet environment promoted  Sleep/Rest Enhancement:   awakenings minimized   comfort measures   consistent schedule promoted   medication   natural light exposure provided   relaxation techniques promoted   regular sleep/rest pattern promoted   visualization  Complementary Therapy:   music therapy provided   essential oils utilized   aromatherapy utilized  Medication Review/Management: medications reviewed

## 2025-01-17 NOTE — PROGRESS NOTES
Renal Medicine Progress Note            Assessment/Plan:     Supriya Herr is a 74 year old female with ESRD who was admitted on 1/8/2025.      1) Influenza A with hypoxic resp failure.   On oseltamivir at ESRD dose.   Mechanically ventilated  Remains above target weight although not overtly very hypervolemic.     2) end-stage renal disease   Chronic dialysis at [facility], Tuesday Thursday Saturday   Primary nephrologist Dr. Basilio  Access: Right AV fistula,   Run time 3.5 hours as outpatient     3) Anemia: HGB today 9.0, stable she is on Mircera as outpt.  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  Vit D 2000 units daily.  Renvela 1600 mg TID c meals and 800 mg with snacks.       Plan:  1) orders placed for tomorrow  2) attempt additional ultrafiltration 2-3 kg goal    Aime Langley DO  [facility]  Office: [phone]  Cell: [phone]        Interval History:     Patient status post dialysis yesterday, ran 3 hours in a K2 bath.  2 L fluid removed.  Tolerated this well hemodynamically except single blood pressure down to 99 systolic during session.  Became hypertensive following.    Weight today 100.7 kg    Remains mechanically midline, does nod head to answer questions yes and no.          Medications and Allergies:     Current Facility-Administered Medications   Medication Dose Route Frequency Provider Last Rate Last Admin    - MEDICATION INSTRUCTIONS for Dialysis Patients -   Does not apply See Admin Instructions Dusty Prieto MD        0.9% Sodium Chloride for DIALYSIS Catheter LOCK - BLUE lumen  10 mL Intracatheter During Dialysis/CRRT (from stock) Aime Langley DO        0.9% Sodium Chloride for DIALYSIS Catheter LOCK - RED lumen  10 mL Intracatheter During Dialysis/CRRT (from stock) Aime Langley DO        amiodarone (PACERONE) tablet 400 mg  400 mg Oral or FT or  NG tube BID Cheo Watt MD   400 mg at 01/17/25 0808    Followed by    [START ON 1/20/2025] amiodarone (PACERONE) tablet 200 mg  200 mg Oral or FT or NG tube BID Cheo Watt MD        Followed by    [START ON 1/23/2025] amiodarone (PACERONE) tablet 200 mg  200 mg Oral or FT or NG tube Daily Cheo Watt MD        [Held by provider] amLODIPine (NORVASC) tablet 5 mg  5 mg Oral BID Marcell Grubbs PA-C   5 mg at 01/08/25 2217    atorvastatin (LIPITOR) tablet 20 mg  20 mg Oral or Feeding Tube QPM Kamryn Peguero MD   20 mg at 01/16/25 2047    B and C vitamin Complex with folic acid (NEPHRONEX) liquid 5 mL  5 mL Per Feeding Tube Daily Truman Moreland MD   5 mL at 01/17/25 0808    budesonide (PULMICORT) neb solution 1 mg  1 mg Nebulization BID Cheo Watt MD   1 mg at 01/17/25 1057    [Held by provider] carvedilol (COREG) tablet 25 mg  25 mg Oral BID w/meals Marcell Grubbs PA-C   25 mg at 01/08/25 1905    cetirizine (zyrTEC) tablet 5 mg  5 mg Oral or Feeding Tube At Bedtime Kamryn Peguero MD   5 mg at 01/16/25 2104    chlorhexidine (PERIDEX) 0.12 % solution 15 mL  15 mL Mouth/Throat Q12H Truman Moreland MD   15 mL at 01/17/25 0808    [Held by provider] furosemide (LASIX) tablet 40 mg  40 mg Oral Once per day on Tuesday Thursday Saturday Marcell Grubbs PA-C        And    [Held by provider] furosemide (LASIX) tablet 80 mg  80 mg Oral Once per day on Tuesday Thursday Saturday Marcell Grubbs PA-C        furosemide (LASIX) tablet 80 mg  80 mg Oral or NG Tube BID Cheo Watt MD   80 mg at 01/17/25 0808    gabapentin (NEURONTIN) solution 100 mg  100 mg Oral or Feeding Tube Daily Kamryn Peguero MD   100 mg at 01/17/25 0808    heparin - BOLUS DOSE from infusion  30 Units/kg Intravenous Once Kamryn Peguero MD        insulin aspart (NovoLOG) injection (RAPID ACTING)  1-12 Units Subcutaneous Q4H Kassy Etienne APRN CNP   8 Units at 01/17/25 0808    insulin glargine (LANTUS PEN)  "injection 12 Units  12 Units Subcutaneous Daily Cheo Watt MD   12 Units at 01/17/25 0808    ipratropium - albuterol 0.5 mg/2.5 mg/3 mL (DUONEB) neb solution 3 mL  3 mL Nebulization 4x daily Marcell Grubbs PA-C   3 mL at 01/17/25 1057    methylPREDNISolone sodium succinate (SOLU-MEDROL) injection 40 mg  40 mg Intravenous Q8H Cheo Watt MD   40 mg at 01/17/25 0422    oseltamivir (TAMIFLU) suspension 30 mg  30 mg Oral or Feeding Tube Q48H John Renner MD   30 mg at 01/15/25 1415    pantoprazole (PROTONIX) 2 mg/mL suspension 40 mg  40 mg Per Feeding Tube [facility] Truman Moreland MD   40 mg at 01/16/25 0821    Or    pantoprazole (PROTONIX) IV push injection 40 mg  40 mg Intravenous [facility] Truman Moreland MD   40 mg at 01/17/25 0807    sertraline (ZOLOFT) tablet 75 mg  75 mg Oral or Feeding Tube QPM Kamryn Peguero MD   75 mg at 01/16/25 2047    sevelamer carbonate (RENVELA) tablet 800 mg  800 mg Oral or Feeding Tube TID w/meals Kamryn Peguero MD   800 mg at 01/17/25 0807    sodium chloride (PF) 0.9% PF flush 3 mL  3 mL Intracatheter Q8H Marcell Grubbs PA-C   3 mL at 01/17/25 0422    tacrolimus (PROTOPIC) 0.1 % ointment   Topical BID Marcell Grubbs PA-C   Given at 01/17/25 0832    vitamin D3 (CHOLECALCIFEROL) tablet 50 mcg  50 mcg Oral or Feeding Tube Daily Kamryn Peguero MD   50 mcg at 01/17/25 0807        Allergies   Allergen Reactions    Ampicillin-Sulbactam Sodium Rash     No evidence SJS, but very uncomfortable and precipitated multiple provider visits. Would not use penicillins again if other options available.     Penicillins Rash            Physical Exam:   Vitals were reviewed  BP (!) 146/69   Pulse 80   Temp 97.3  F (36.3  C) (Axillary)   Resp 11   Ht 1.702 m (5' 7\")   Wt 100.7 kg (222 lb 0.1 oz)   LMP  (LMP Unknown)   SpO2 96%   BMI 34.77 kg/m      Wt Readings from Last 3 Encounters:   01/17/25 100.7 kg (222 lb 0.1 oz)   06/14/24 101.1 kg (222 lb " 14.2 oz)   06/03/24 101.1 kg (222 lb 14.2 oz)       Intake/Output Summary (Last 24 hours) at 1/17/2025 1221  Last data filed at 1/17/2025 1000  Gross per 24 hour   Intake 3566.91 ml   Output 2000 ml   Net 1566.91 ml       GENERAL APPEARANCE: intubated, sedated  HEENT:  Eyes/ears/nose/neck grossly normal  RESP: + vent sounds  CV: RRR, nl S1/S2, no m/r/g   ABDOMEN: o/s/nt/nd, bs present  EXTREMITIES/SKIN: no c/c/rashes/lesions; trace dependent edema  NEURO:  sedated              Data:     BMP  Recent Labs   Lab 01/17/25  0807 01/17/25  0423 01/17/25  0237 01/17/25  0029 01/16/25  0819 01/16/25  0617 01/15/25  0802 01/15/25  0507 01/14/25  0812 01/14/25  0451   NA  --   --  131*  --   --  130*  --  133*  --  132*   POTASSIUM  --   --  4.9  --   --  5.3  --  4.7  --  4.0   CHLORIDE  --   --  90*  --   --  89*  --  92*  --  93*   JODY  --   --  10.3  --   --  10.6*  --  9.9  --  9.5   CO2  --   --  29  --   --  27  --  28  --  27   BUN  --   --  51.1*  --   --  62.8*  --  26.7*  --  31.0*   CR  --   --  2.77*  --   --  3.48*  --  2.27*  --  3.23*   * 300* 323* 314*   < > 362*   < > 280*   < > 137*    < > = values in this interval not displayed.     CBC  Recent Labs   Lab 01/17/25  0237 01/16/25  1907 01/16/25  0455 01/15/25  0507   WBC 9.9 12.1* 8.5 6.2   HGB 9.0* 8.5* 8.6* 8.6*   HCT 28.5* 26.3* 27.1* 26.9*   MCV 98 96 96 96    259 258 182     Lab Results   Component Value Date    AST 90 (H) 01/16/2025     (H) 01/16/2025    ALKPHOS 95 01/16/2025    BILITOTAL 0.2 01/16/2025     Lab Results   Component Value Date    INR 1.11 04/24/2024       Attestation:  I have reviewed today's vital signs, notes, medications, labs and imaging.    DO [facility] - [facility]phrology  Office: [phone]  Cell: [phone]

## 2025-01-17 NOTE — PROGRESS NOTES
ICU Progress Note   Date of Service: 01/17/25    Assessment and Plan:  Supriya Herr is a 74 year old female with a history of ESRD on HD, CHF, COPD, DM type II, hypertension who was admitted 1/8 with influenza A. This morning during dialysis, an RRT was called for hypoxia and she was intubated. The patient was admitted to the ICU for medical management.      Interval events:   Failed SBT yesterday and today, had iHD yesterday with 2L removal  with good tolerance.     Neuro:  # Sedation for mechanical ventilation  # Likely baseline cognitive impairment  - Precedex for sedation  - RASS goal of 0 to -1  - PTA Zoloft, gabapentin     CV:  # Shock, septic vs polypharmacy (resolved)  # Hx of CHF  # Hx of HTN, HLD  # Afib RVR  - MAP goal > 65, not requiring pressors  - Has been on amidarone gtt, switch to PO today  - Given high CHADS Vasc score and persistent Afib started on IV heparin gtt  - Hold home amlodipine, carvedilol     Pulm:  # Acute hypoxemic respiratory failure  # Flu A  # COPD exacerbation  # JONE  - low tidal volume ventilation  - BLE Doppler US negative 1/9  - CTA chest 1/16 negative for PE but with b/l effusions and basilar atelectasis  - Increase PEEP to 8 to help with atelectasis and body habitus  - Had 2L removed with iHD yesterday  - Tolerated 10/8 PS trial today which is better than yesterday  - Plan for SBT tomorrow after iHD  - S/p 5 days of abx coverage  - Decrease methylpred from 40 q8 to q12  - Continue nebs  - Cont pulmicort nebs BID  - Appreciate nephrology help with fluid removal via iHD, can support with NE to help with fluid removal     GI:  - RD to manage TF     Renal:  # ESRD on HD  - Nephrology consult, appreciate recs  - Fluid removal with iHD  - Can use low dose pressors as needed to help with fluid removal during iHD     ID:  # Influenza A pneumonia  # ? Community acquired pneumonia   - sputum culture no growth   - to complete 10 days of Oseltamivir today   - S/p 5 days of cefepime      "  Heme:  #Chronic anemia   - Hgb baseline 9-10   - Transfuse for Hgb < 7     # Afib new onset  - start heparin gtt     Endo:  # Hx of DMII  - rising hyperglycemia in the setting of steroids  - start insulin gtt, once steroids are weaned can switch back to sliding scale insulin +/- glargine     PPx:  1. DVT: heparin  2. VAP: HOB 30 degrees, chlorhexidine rinse  3. Stress Ulcer: PPI  4. Restraints: Nonviolent soft two point restraints required and necessary for patient safety and continued cares and good effect as patient continues to pull at necessary lines, tubes despite education and distraction. Will readdress daily.   5. Wound care - per unit routine   6. Feeding - TF  7. Family updated at the bedside (daughter)     Dispo: ICU      BP (!) 147/77 (BP Location: Right leg)   Pulse 75   Temp 97.8  F (36.6  C) (Axillary)   Resp 25   Ht 1.702 m (5' 7\")   Wt 100.7 kg (222 lb 0.1 oz)   LMP  (LMP Unknown)   SpO2 98%   BMI 34.77 kg/m      FiO2 (%): 30 %, Resp: 25, Vent Mode: (S) CPAP/PS, Resp Rate (Set): 12 breaths/min, Tidal Volume (Set, mL): 430 mL, PEEP (cm H2O): 5 cmH2O, Pressure Support (cm H2O): 10 cmH2O, Resp Rate (Set): 12 breaths/min, Tidal Volume (Set, mL): 430 mL, PEEP (cm H2O): 5 cmH2O      Intake/Output Summary (Last 24 hours) at 1/17/2025 1734  Last data filed at 1/17/2025 1200  Gross per 24 hour   Intake 1340.31 ml   Output --   Net 1340.31 ml       Physical Exam:  Gen: Laying in bed in NAD  HEENT: NC AT  Resp: intubated, B/L air entry, no wheezing   CVS: S1 S2, regular rhythm on tele  Abd: Soft NT ND  Ext: no swelling noted, left BKA  Neuro: opens eyes when off sedation and moves extremities    Labs: reviewed    Imaging: reviewed    Past Medical/Surgical history     Family history     Social history     Time Spent on this Encounter   Supriya was seen and evaluated by me on 01/17/25.  She was in critical condition as the result of hypoxic respiratory failure.    Her condition is now Fair.      The " acute issues managed by me today include acute hypoxic respiratory failure, Flu, ESRD on HD, fluid overload, Afib RVR, and DM   Supportive interventions provided and/or ordered by me include mechanical ventilation, fluid removal with iHD, oseltamivir, amiodarone, IV heparin, and monitoring.    Total Critical Care time spent by me, excluding procedures, was 45 minutes.    Cheo Watt MD

## 2025-01-17 NOTE — PROGRESS NOTES
[facility] ICU RESPIRATORY NOTE        Date of Admission: 1/8/2025    Date of Intubation (most recent): 01/09/2025    Reason for Mechanical Ventilation: Airway Protection    Number of Days on Mechanical Ventilation: 9    Met Criteria for Spontaneous Breathing Trial: Yes - 10/8, 30% since 1600    Bite Block: No    Significant Events Today: None    ABG Results:   Recent Labs   Lab 01/17/25  1213 01/16/25  0901   O2PER 30 30         Current Vent Settings: FiO2 (%): 30 %, Resp: 25, Vent Mode: (S) CPAP/PS, Resp Rate (Set): 12 breaths/min, Tidal Volume (Set, mL): 430 mL, PEEP (cm H2O): 5 cmH2O, Pressure Support (cm H2O): 10 cmH2O, Resp Rate (Set): 12 breaths/min, Tidal Volume (Set, mL): 430 mL, PEEP (cm H2O): 5 cmH2O      Plan: Full support overnight and reattempt PST tomorrow with hopes to extubate (to BiPAP if necessary)    Gaston Fernandez, RT on 1/17/2025 at 5:46 PM

## 2025-01-17 NOTE — PROGRESS NOTES
[facility] ICU RESPIRATORY NOTE        Date of Admission: 1/8/2025    Date of Intubation (most recent): 1/9/2025    Reason for Mechanical Ventilation: Airway Protection    Number of Days on Mechanical Ventilation: 9    Met Criteria for Spontaneous Breathing Trial: Yes     Bite Block: No    Significant Events Today: None Overnight    ABG Results:   Recent Labs   Lab 01/16/25  0901   O2PER 30         Current Vent Settings: FiO2 (%): 30 %, Resp: 13, Vent Mode: CMV/AC, Resp Rate (Set): 12 breaths/min, Tidal Volume (Set, mL): 470 mL, PEEP (cm H2O): 5 cmH2O, Pressure Support (cm H2O): 10 cmH2O, Resp Rate (Set): 12 breaths/min, Tidal Volume (Set, mL): 470 mL, PEEP (cm H2O): 5 cmH2O    Plan: Continue full vent support and assess for weaning    RT Deion on 1/17/2025 at 4:25 AM

## 2025-01-18 LAB
ANION GAP SERPL CALCULATED.3IONS-SCNC: 16 MMOL/L (ref 7–15)
BUN SERPL-MCNC: 86.3 MG/DL (ref 8–23)
CALCIUM SERPL-MCNC: 10.4 MG/DL (ref 8.8–10.4)
CHLORIDE SERPL-SCNC: 89 MMOL/L (ref 98–107)
CREAT SERPL-MCNC: 3.97 MG/DL (ref 0.51–0.95)
EGFRCR SERPLBLD CKD-EPI 2021: 11 ML/MIN/1.73M2
ERYTHROCYTE [DISTWIDTH] IN BLOOD BY AUTOMATED COUNT: 15.4 % (ref 10–15)
GLUCOSE BLDC GLUCOMTR-MCNC: 100 MG/DL (ref 70–99)
GLUCOSE BLDC GLUCOMTR-MCNC: 115 MG/DL (ref 70–99)
GLUCOSE BLDC GLUCOMTR-MCNC: 122 MG/DL (ref 70–99)
GLUCOSE BLDC GLUCOMTR-MCNC: 138 MG/DL (ref 70–99)
GLUCOSE BLDC GLUCOMTR-MCNC: 144 MG/DL (ref 70–99)
GLUCOSE BLDC GLUCOMTR-MCNC: 146 MG/DL (ref 70–99)
GLUCOSE BLDC GLUCOMTR-MCNC: 147 MG/DL (ref 70–99)
GLUCOSE BLDC GLUCOMTR-MCNC: 150 MG/DL (ref 70–99)
GLUCOSE BLDC GLUCOMTR-MCNC: 161 MG/DL (ref 70–99)
GLUCOSE BLDC GLUCOMTR-MCNC: 161 MG/DL (ref 70–99)
GLUCOSE BLDC GLUCOMTR-MCNC: 165 MG/DL (ref 70–99)
GLUCOSE BLDC GLUCOMTR-MCNC: 174 MG/DL (ref 70–99)
GLUCOSE BLDC GLUCOMTR-MCNC: 175 MG/DL (ref 70–99)
GLUCOSE BLDC GLUCOMTR-MCNC: 185 MG/DL (ref 70–99)
GLUCOSE BLDC GLUCOMTR-MCNC: 214 MG/DL (ref 70–99)
GLUCOSE SERPL-MCNC: 160 MG/DL (ref 70–99)
HCO3 SERPL-SCNC: 26 MMOL/L (ref 22–29)
HCT VFR BLD AUTO: 28.3 % (ref 35–47)
HGB BLD-MCNC: 9.2 G/DL (ref 11.7–15.7)
MCH RBC QN AUTO: 31.5 PG (ref 26.5–33)
MCHC RBC AUTO-ENTMCNC: 32.5 G/DL (ref 31.5–36.5)
MCV RBC AUTO: 97 FL (ref 78–100)
PLATELET # BLD AUTO: 280 10E3/UL (ref 150–450)
POTASSIUM SERPL-SCNC: 4.9 MMOL/L (ref 3.4–5.3)
RBC # BLD AUTO: 2.92 10E6/UL (ref 3.8–5.2)
SODIUM SERPL-SCNC: 131 MMOL/L (ref 135–145)
UFH PPP CHRO-ACNC: 0.12 IU/ML
UFH PPP CHRO-ACNC: 0.23 IU/ML
UFH PPP CHRO-ACNC: 0.39 IU/ML
WBC # BLD AUTO: 7.8 10E3/UL (ref 4–11)

## 2025-01-18 PROCEDURE — 99291 CRITICAL CARE FIRST HOUR: CPT | Performed by: INTERNAL MEDICINE

## 2025-01-18 PROCEDURE — 80048 BASIC METABOLIC PNL TOTAL CA: CPT | Performed by: STUDENT IN AN ORGANIZED HEALTH CARE EDUCATION/TRAINING PROGRAM

## 2025-01-18 PROCEDURE — 82435 ASSAY OF BLOOD CHLORIDE: CPT | Performed by: STUDENT IN AN ORGANIZED HEALTH CARE EDUCATION/TRAINING PROGRAM

## 2025-01-18 PROCEDURE — 99232 SBSQ HOSP IP/OBS MODERATE 35: CPT | Performed by: INTERNAL MEDICINE

## 2025-01-18 PROCEDURE — 250N000009 HC RX 250: Performed by: INTERNAL MEDICINE

## 2025-01-18 PROCEDURE — 250N000009 HC RX 250: Performed by: STUDENT IN AN ORGANIZED HEALTH CARE EDUCATION/TRAINING PROGRAM

## 2025-01-18 PROCEDURE — 85520 HEPARIN ASSAY: CPT | Performed by: INTERNAL MEDICINE

## 2025-01-18 PROCEDURE — 200N000001 HC R&B ICU

## 2025-01-18 PROCEDURE — 999N000259 HC STATISTIC EXTUBATION

## 2025-01-18 PROCEDURE — 94640 AIRWAY INHALATION TREATMENT: CPT

## 2025-01-18 PROCEDURE — 250N000013 HC RX MED GY IP 250 OP 250 PS 637: Performed by: INTERNAL MEDICINE

## 2025-01-18 PROCEDURE — 258N000003 HC RX IP 258 OP 636: Performed by: INTERNAL MEDICINE

## 2025-01-18 PROCEDURE — 250N000013 HC RX MED GY IP 250 OP 250 PS 637: Performed by: STUDENT IN AN ORGANIZED HEALTH CARE EDUCATION/TRAINING PROGRAM

## 2025-01-18 PROCEDURE — 999N000157 HC STATISTIC RCP TIME EA 10 MIN

## 2025-01-18 PROCEDURE — 634N000001 HC RX 634: Mod: JZ | Performed by: INTERNAL MEDICINE

## 2025-01-18 PROCEDURE — 90937 HEMODIALYSIS REPEATED EVAL: CPT

## 2025-01-18 PROCEDURE — 999N000009 HC STATISTIC AIRWAY CARE

## 2025-01-18 PROCEDURE — 94003 VENT MGMT INPAT SUBQ DAY: CPT

## 2025-01-18 PROCEDURE — 999N000253 HC STATISTIC WEANING TRIALS

## 2025-01-18 PROCEDURE — 36415 COLL VENOUS BLD VENIPUNCTURE: CPT | Performed by: STUDENT IN AN ORGANIZED HEALTH CARE EDUCATION/TRAINING PROGRAM

## 2025-01-18 PROCEDURE — 250N000011 HC RX IP 250 OP 636: Performed by: INTERNAL MEDICINE

## 2025-01-18 PROCEDURE — 85014 HEMATOCRIT: CPT | Performed by: STUDENT IN AN ORGANIZED HEALTH CARE EDUCATION/TRAINING PROGRAM

## 2025-01-18 PROCEDURE — 94640 AIRWAY INHALATION TREATMENT: CPT | Mod: 76

## 2025-01-18 PROCEDURE — 36415 COLL VENOUS BLD VENIPUNCTURE: CPT | Performed by: INTERNAL MEDICINE

## 2025-01-18 RX ORDER — METOPROLOL TARTRATE 1 MG/ML
2.5-5 INJECTION, SOLUTION INTRAVENOUS EVERY 6 HOURS PRN
Status: DISCONTINUED | OUTPATIENT
Start: 2025-01-18 | End: 2025-01-25

## 2025-01-18 RX ORDER — CARVEDILOL 12.5 MG/1
12.5 TABLET ORAL 2 TIMES DAILY WITH MEALS
Status: DISCONTINUED | OUTPATIENT
Start: 2025-01-18 | End: 2025-01-19

## 2025-01-18 RX ADMIN — SEVELAMER CARBONATE 800 MG: 800 TABLET, FILM COATED ORAL at 09:58

## 2025-01-18 RX ADMIN — PANTOPRAZOLE SODIUM 40 MG: 40 INJECTION, POWDER, FOR SOLUTION INTRAVENOUS at 10:07

## 2025-01-18 RX ADMIN — CHLORHEXIDINE GLUCONATE 15 ML: 1.2 RINSE ORAL at 09:58

## 2025-01-18 RX ADMIN — TACROLIMUS: 1 OINTMENT TOPICAL at 20:39

## 2025-01-18 RX ADMIN — Medication 50 MCG: at 09:58

## 2025-01-18 RX ADMIN — BUDESONIDE 1 MG: 1 INHALANT ORAL at 19:23

## 2025-01-18 RX ADMIN — IPRATROPIUM BROMIDE AND ALBUTEROL SULFATE 3 ML: .5; 3 SOLUTION RESPIRATORY (INHALATION) at 19:22

## 2025-01-18 RX ADMIN — METHYLPREDNISOLONE SODIUM SUCCINATE 40 MG: 40 INJECTION, POWDER, FOR SOLUTION INTRAMUSCULAR; INTRAVENOUS at 17:56

## 2025-01-18 RX ADMIN — IPRATROPIUM BROMIDE AND ALBUTEROL SULFATE 3 ML: .5; 3 SOLUTION RESPIRATORY (INHALATION) at 15:09

## 2025-01-18 RX ADMIN — EPOETIN ALFA-EPBX 10000 UNITS: 10000 INJECTION, SOLUTION INTRAVENOUS; SUBCUTANEOUS at 14:29

## 2025-01-18 RX ADMIN — GABAPENTIN 100 MG: 250 SOLUTION ORAL at 10:07

## 2025-01-18 RX ADMIN — SODIUM CHLORIDE 200 ML: 9 INJECTION, SOLUTION INTRAVENOUS at 14:28

## 2025-01-18 RX ADMIN — SODIUM CHLORIDE 500 ML: 9 INJECTION, SOLUTION INTRAVENOUS at 14:28

## 2025-01-18 RX ADMIN — HEPARIN SODIUM 1150 UNITS/HR: 10000 INJECTION, SOLUTION INTRAVENOUS at 04:01

## 2025-01-18 RX ADMIN — DEXMEDETOMIDINE HYDROCHLORIDE 0.2 MCG/KG/HR: 400 INJECTION INTRAVENOUS at 04:36

## 2025-01-18 RX ADMIN — INSULIN HUMAN 10 UNITS/HR: 1 INJECTION, SOLUTION INTRAVENOUS at 00:24

## 2025-01-18 RX ADMIN — METHYLPREDNISOLONE SODIUM SUCCINATE 40 MG: 40 INJECTION, POWDER, FOR SOLUTION INTRAMUSCULAR; INTRAVENOUS at 06:10

## 2025-01-18 RX ADMIN — CARVEDILOL 12.5 MG: 12.5 TABLET, FILM COATED ORAL at 12:33

## 2025-01-18 RX ADMIN — METOPROLOL TARTRATE 5 MG: 5 INJECTION INTRAVENOUS at 20:32

## 2025-01-18 RX ADMIN — SEVELAMER CARBONATE 800 MG: 800 TABLET, FILM COATED ORAL at 12:33

## 2025-01-18 RX ADMIN — INSULIN HUMAN 1 UNITS/HR: 1 INJECTION, SOLUTION INTRAVENOUS at 19:44

## 2025-01-18 RX ADMIN — Medication 5 ML: at 09:59

## 2025-01-18 RX ADMIN — FUROSEMIDE 80 MG: 20 TABLET ORAL at 09:58

## 2025-01-18 RX ADMIN — HEPARIN SODIUM 1300 UNITS/HR: 10000 INJECTION, SOLUTION INTRAVENOUS at 22:24

## 2025-01-18 RX ADMIN — SEVELAMER CARBONATE 800 MG: 800 TABLET, FILM COATED ORAL at 17:56

## 2025-01-18 RX ADMIN — IPRATROPIUM BROMIDE AND ALBUTEROL SULFATE 3 ML: .5; 3 SOLUTION RESPIRATORY (INHALATION) at 07:18

## 2025-01-18 RX ADMIN — FUROSEMIDE 80 MG: 20 TABLET ORAL at 16:23

## 2025-01-18 RX ADMIN — AMIODARONE HYDROCHLORIDE 400 MG: 200 TABLET ORAL at 09:58

## 2025-01-18 RX ADMIN — TACROLIMUS: 1 OINTMENT TOPICAL at 10:05

## 2025-01-18 RX ADMIN — SODIUM CHLORIDE 250 ML: 9 INJECTION, SOLUTION INTRAVENOUS at 14:29

## 2025-01-18 RX ADMIN — IPRATROPIUM BROMIDE AND ALBUTEROL SULFATE 3 ML: .5; 3 SOLUTION RESPIRATORY (INHALATION) at 10:36

## 2025-01-18 ASSESSMENT — ACTIVITIES OF DAILY LIVING (ADL)
ADLS_ACUITY_SCORE: 87
ADLS_ACUITY_SCORE: 87
ADLS_ACUITY_SCORE: 89
ADLS_ACUITY_SCORE: 87
ADLS_ACUITY_SCORE: 89
ADLS_ACUITY_SCORE: 87
ADLS_ACUITY_SCORE: 87
ADLS_ACUITY_SCORE: 89
ADLS_ACUITY_SCORE: 87
ADLS_ACUITY_SCORE: 89
ADLS_ACUITY_SCORE: 87
ADLS_ACUITY_SCORE: 89
ADLS_ACUITY_SCORE: 89
ADLS_ACUITY_SCORE: 87
ADLS_ACUITY_SCORE: 89

## 2025-01-18 NOTE — PROGRESS NOTES
Renal Medicine Progress Note            Assessment/Plan:     Supriya Herr is a 74 year old female with ESRD who was admitted on 1/8/2025.      1) Influenza A with hypoxic resp failure.   On oseltamivir at ESRD dose.   Mechanically ventilated  Remains above target weight although not overtly very hypervolemic.     2) end-stage renal disease   Chronic dialysis at [facility], [place], Tuesday Thursday Saturday   Primary nephrologist Dr. Basilio  Access: Right AV fistula,   Run time 3.5 hours as outpatient     3) Anemia: HGB today 9. 2, stable she is on Mircera as outpt.  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  Vit D 2000 units daily.  Renvela 1600 mg TID c meals and 800 mg with snacks.       Plan:  1) dialysis today.  We did this afternoon  2) attempt additional ultrafiltration 2-3 kg goal, plan to support with Levophed if needed    Aime Langley DO  [facility]  Office: [phone]  Cell: [phone]        Interval History:     No segment changes.  Remains in the ICU on mechanical ventilation.  FiO2 30% and stable.          Medications and Allergies:     Current Facility-Administered Medications   Medication Dose Route Frequency Provider Last Rate Last Admin    - MEDICATION INSTRUCTIONS for Dialysis Patients -   Does not apply See Admin Instructions Dusty Prieto MD        0.9% Sodium Chloride for DIALYSIS Catheter LOCK - BLUE lumen  10 mL Intracatheter During Dialysis/CRRT (from stock) Aime Langley DO        0.9% Sodium Chloride for DIALYSIS Catheter LOCK - RED lumen  10 mL Intracatheter During Dialysis/CRRT (from stock) Aime Langley DO        amiodarone (PACERONE) tablet 400 mg  400 mg Oral or FT or NG tube BID Cheo Watt MD   400 mg at 01/18/25 0958    Followed by    [START ON 1/20/2025] amiodarone (PACERONE) tablet 200 mg  200 mg Oral or FT or NG tube BID Cheo Watt MD         Followed by    [START ON 1/23/2025] amiodarone (PACERONE) tablet 200 mg  200 mg Oral or FT or NG tube Daily Cheo Watt MD        [Held by provider] amLODIPine (NORVASC) tablet 5 mg  5 mg Oral BID Marcell Grubbs PA-C   5 mg at 01/08/25 2217    atorvastatin (LIPITOR) tablet 20 mg  20 mg Oral or Feeding Tube QPM Kamryn Peguero MD   20 mg at 01/17/25 1930    B and C vitamin Complex with folic acid (NEPHRONEX) liquid 5 mL  5 mL Per Feeding Tube Daily Truman Moreland MD   5 mL at 01/18/25 0959    budesonide (PULMICORT) neb solution 1 mg  1 mg Nebulization BID Cheo Watt MD   1 mg at 01/17/25 1944    [Held by provider] carvedilol (COREG) tablet 25 mg  25 mg Oral BID w/meals Marcell Grubbs PA-C   25 mg at 01/08/25 1905    cetirizine (zyrTEC) tablet 5 mg  5 mg Oral or Feeding Tube At Bedtime Kamryn Peguero MD   5 mg at 01/17/25 2158    chlorhexidine (PERIDEX) 0.12 % solution 15 mL  15 mL Mouth/Throat Q12H Truman Moreland MD   15 mL at 01/18/25 0958    [Held by provider] furosemide (LASIX) tablet 40 mg  40 mg Oral Once per day on Tuesday Thursday Saturday Marcell Grubbs PA-C        And    [Held by provider] furosemide (LASIX) tablet 80 mg  80 mg Oral Once per day on Tuesday Thursday Saturday Marcell Grubbs PA-C        furosemide (LASIX) tablet 80 mg  80 mg Oral or NG Tube BID Cheo Watt MD   80 mg at 01/18/25 0958    gabapentin (NEURONTIN) solution 100 mg  100 mg Oral or Feeding Tube Daily Kamryn Peguero MD   100 mg at 01/18/25 1007    ipratropium - albuterol 0.5 mg/2.5 mg/3 mL (DUONEB) neb solution 3 mL  3 mL Nebulization 4x daily Marcell Grubbs PA-C   3 mL at 01/18/25 0718    methylPREDNISolone sodium succinate (SOLU-MEDROL) injection 40 mg  40 mg Intravenous Q12H Cheo Watt MD   40 mg at 01/18/25 0610    pantoprazole (PROTONIX) 2 mg/mL suspension 40 mg  40 mg Per Feeding Tube [facility] Truman Moreland MD   40 mg at 01/16/25 0821    Or    pantoprazole (PROTONIX) IV  "push injection 40 mg  40 mg Intravenous QAM AC Truman Moreland MD   40 mg at 01/18/25 1007    sertraline (ZOLOFT) tablet 75 mg  75 mg Oral or Feeding Tube QPM Kamryn Peguero MD   75 mg at 01/17/25 1930    sevelamer carbonate (RENVELA) tablet 800 mg  800 mg Oral or Feeding Tube TID w/meals Kamryn Peguero MD   800 mg at 01/18/25 0958    sodium chloride (PF) 0.9% PF flush 3 mL  3 mL Intracatheter Q8H Marcell Grubbs PA-C   3 mL at 01/17/25 1930    sodium chloride 0.9% BOLUS 500 mL  500 mL Hemodialysis Machine Once Aime Langley DO        tacrolimus (PROTOPIC) 0.1 % ointment   Topical BID Marcell Grubbs PA-C   Given at 01/18/25 1005    vitamin D3 (CHOLECALCIFEROL) tablet 50 mcg  50 mcg Oral or Feeding Tube Daily Kamryn Peguero MD   50 mcg at 01/18/25 0958        Allergies   Allergen Reactions    Ampicillin-Sulbactam Sodium Rash     No evidence SJS, but very uncomfortable and precipitated multiple provider visits. Would not use penicillins again if other options available.     Penicillins Rash            Physical Exam:   Vitals were reviewed  /43 (BP Location: Right leg)   Pulse 86   Temp 97.8  F (36.6  C) (Axillary)   Resp 26   Ht 1.702 m (5' 7\")   Wt 101.2 kg (223 lb 1.7 oz)   LMP  (LMP Unknown)   SpO2 99%   BMI 34.94 kg/m      Wt Readings from Last 3 Encounters:   01/18/25 101.2 kg (223 lb 1.7 oz)   06/14/24 101.1 kg (222 lb 14.2 oz)   06/03/24 101.1 kg (222 lb 14.2 oz)       Intake/Output Summary (Last 24 hours) at 1/18/2025 1029  Last data filed at 1/18/2025 0800  Gross per 24 hour   Intake 2198.16 ml   Output --   Net 2198.16 ml       GENERAL APPEARANCE: intubated, sedated  HEENT:  Eyes/ears/nose/neck grossly normal  RESP: + vent sounds  CV: RRR, nl S1/S2, no m/r/g   ABDOMEN: o/s/nt/nd, bs present  EXTREMITIES/SKIN: no c/c/rashes/lesions; trace dependent edema  NEURO:  sedated                 Data:     BMP  Recent Labs   Lab 01/18/25  0957 01/18/25  0759 01/18/25  0655 " 01/18/25  0615 01/18/25  0455 01/17/25  0423 01/17/25  0237 01/16/25  0819 01/16/25  0617 01/15/25  0802 01/15/25  0507   NA  --   --   --   --  131*  --  131*  --  130*  --  133*   POTASSIUM  --   --   --   --  4.9  --  4.9  --  5.3  --  4.7   CHLORIDE  --   --   --   --  89*  --  90*  --  89*  --  92*   JODY  --   --   --   --  10.4  --  10.3  --  10.6*  --  9.9   CO2  --   --   --   --  26  --  29  --  27  --  28   BUN  --   --   --   --  86.3*  --  51.1*  --  62.8*  --  26.7*   CR  --   --   --   --  3.97*  --  2.77*  --  3.48*  --  2.27*   * 146* 122* 138* 160*   < > 323*   < > 362*   < > 280*    < > = values in this interval not displayed.     CBC  Recent Labs   Lab 01/18/25  0455 01/17/25  0237 01/16/25  1907 01/16/25 0455   WBC 7.8 9.9 12.1* 8.5   HGB 9.2* 9.0* 8.5* 8.6*   HCT 28.3* 28.5* 26.3* 27.1*   MCV 97 98 96 96    254 259 258     Lab Results   Component Value Date    AST 90 (H) 01/16/2025     (H) 01/16/2025    ALKPHOS 95 01/16/2025    BILITOTAL 0.2 01/16/2025     Lab Results   Component Value Date    INR 1.11 04/24/2024       Attestation:  I have reviewed today's vital signs, notes, medications, labs and imaging.    DO [facility]  Office: [phone]  Cell: [phone]

## 2025-01-18 NOTE — PLAN OF CARE
"Goal Outcome Evaluation:      Plan of Care Reviewed With: patient, family    Overall Patient Progress: improvingOverall Patient Progress: improving         VSS on vent via ETT 30%/5-8; PEEP adjusted by Dr. Watt this shift, pt tolerating well. PST beginning this evening, continue to monitor. Rested in bed between cares, A1-2 with lift to reposition. RASS score 0 to -1 this shift on precedex, see MAR. Hep gtt adjusted, HepXa due to be collected. Pt denies pain, no prn interventions provided. Able follow commands, answer yes/no questions. Anuric, no UO made this shift. BS+, no BM. TF at goal rate, meds and flushes as ordered. BG remaining elevated despite sliding scale insulin, lantus administrations. New orders to initiate insulin gtt this shift. Tele afib, CVR. Family at bedside, supportive and involved. Bed alarms in use, pt not attempting to exit bed. Continue to monitor.      Problem: Adult Inpatient Plan of Care  Goal: Plan of Care Review  Description: The Plan of Care Review/Shift note should be completed every shift.  The Outcome Evaluation is a brief statement about your assessment that the patient is improving, declining, or no change.  This information will be displayed automatically on your shift  note.  Outcome: Progressing  Flowsheets (Taken 1/17/2025 1843)  Plan of Care Reviewed With:   patient   family  Overall Patient Progress: improving  Goal: Patient-Specific Goal (Individualized)  Description: You can add care plan individualizations to a care plan. Examples of Individualization might be:  \"Parent requests to be called daily at 9am for status\", \"I have a hard time hearing out of my right ear\", or \"Do not touch me to wake me up as it startles  me\".  Outcome: Progressing  Goal: Absence of Hospital-Acquired Illness or Injury  Outcome: Progressing  Intervention: Identify and Manage Fall Risk  Recent Flowsheet Documentation  Taken 1/17/2025 1600 by Diana Clark, RN  Safety Promotion/Fall " Prevention:   treat underlying cause   treat reversible contributory factors   supervised activity   safety round/check completed   room organization consistent   room near nurse's station   patient and family education   nonskid shoes/slippers when out of bed   mobility aid in reach   lighting adjusted   increase visualization of patient   increased rounding and observation   clutter free environment maintained   activity supervised   assistive device/personal items within reach  Taken 1/17/2025 1200 by Diana Clark, RN  Safety Promotion/Fall Prevention:   treat underlying cause   treat reversible contributory factors   supervised activity   safety round/check completed   room organization consistent   room near nurse's station   patient and family education   nonskid shoes/slippers when out of bed   mobility aid in reach   lighting adjusted   increase visualization of patient   increased rounding and observation   clutter free environment maintained   activity supervised   assistive device/personal items within reach  Taken 1/17/2025 0800 by Diana Clark, RN  Safety Promotion/Fall Prevention:   treat underlying cause   treat reversible contributory factors   supervised activity   safety round/check completed   room organization consistent   room near nurse's station   patient and family education   nonskid shoes/slippers when out of bed   mobility aid in reach   lighting adjusted   increase visualization of patient   increased rounding and observation   clutter free environment maintained   activity supervised   assistive device/personal items within reach  Intervention: Prevent Skin Injury  Recent Flowsheet Documentation  Taken 1/17/2025 1600 by Diana Clark, RN  Body Position:   turned   log-rolled   legs elevated   heels elevated   weight shifting   upper extremity elevated   side-lying 30 degrees   lower extremity elevated  Skin Protection:   adhesive use limited   skin to device areas padded    silicone foam dressing in place   pulse oximeter probe site changed   tubing/devices free from skin contact   skin sealant/moisture barrier applied  Taken 1/17/2025 1400 by Diana Clark RN  Body Position:   turned   log-rolled   legs elevated   heels elevated   weight shifting   upper extremity elevated   side-lying 30 degrees   lower extremity elevated  Taken 1/17/2025 1200 by Diana Clark RN  Body Position:   turned   log-rolled   legs elevated   heels elevated   weight shifting   upper extremity elevated   side-lying 30 degrees   lower extremity elevated  Skin Protection:   adhesive use limited   skin to device areas padded   silicone foam dressing in place   pulse oximeter probe site changed   tubing/devices free from skin contact   skin sealant/moisture barrier applied  Taken 1/17/2025 1000 by Diana Clark RN  Body Position:   turned   log-rolled   legs elevated   heels elevated   weight shifting   upper extremity elevated   side-lying 30 degrees   lower extremity elevated  Taken 1/17/2025 0800 by Diana Clark RN  Body Position:   turned   log-rolled   legs elevated   heels elevated   weight shifting   upper extremity elevated   side-lying 30 degrees   lower extremity elevated  Skin Protection:   adhesive use limited   skin to device areas padded   silicone foam dressing in place   pulse oximeter probe site changed   tubing/devices free from skin contact   skin sealant/moisture barrier applied  Intervention: Prevent and Manage VTE (Venous Thromboembolism) Risk  Recent Flowsheet Documentation  Taken 1/17/2025 1600 by Diana Clark RN  VTE Prevention/Management: SCDs off (sequential compression devices)  Taken 1/17/2025 1200 by Diana Clark RN  VTE Prevention/Management: SCDs off (sequential compression devices)  Taken 1/17/2025 0800 by Diana Clark RN  VTE Prevention/Management: SCDs off (sequential compression devices)  Intervention: Prevent Infection  Recent Flowsheet  Documentation  Taken 1/17/2025 1600 by Diaan Clark RN  Infection Prevention:   single patient room provided   rest/sleep promoted   personal protective equipment utilized   hand hygiene promoted   equipment surfaces disinfected  Taken 1/17/2025 1200 by Diana Clark RN  Infection Prevention:   single patient room provided   rest/sleep promoted   personal protective equipment utilized   hand hygiene promoted   equipment surfaces disinfected  Taken 1/17/2025 0800 by Diana Clark RN  Infection Prevention:   single patient room provided   rest/sleep promoted   personal protective equipment utilized   hand hygiene promoted   equipment surfaces disinfected  Goal: Optimal Comfort and Wellbeing  Outcome: Progressing  Intervention: Monitor Pain and Promote Comfort  Recent Flowsheet Documentation  Taken 1/17/2025 1600 by Diana Clark RN  Pain Management Interventions:   pillow support provided   rest   repositioned   pain pump in use   pain management plan reviewed with patient/caregiver   quiet environment facilitated   emotional support   care clustered  Taken 1/17/2025 1200 by Diana Clark RN  Pain Management Interventions:   pillow support provided   rest   repositioned   pain pump in use   pain management plan reviewed with patient/caregiver   quiet environment facilitated   emotional support   care clustered  Taken 1/17/2025 0800 by Diana Clark RN  Pain Management Interventions:   pillow support provided   rest   repositioned   pain pump in use   pain management plan reviewed with patient/caregiver   quiet environment facilitated   emotional support   care clustered  Intervention: Provide Person-Centered Care  Recent Flowsheet Documentation  Taken 1/17/2025 1600 by Diana Clark RN  Trust Relationship/Rapport:   care explained   choices provided   reassurance provided   questions encouraged   questions answered  Taken 1/17/2025 1200 by Diana Clark RN  Trust  Relationship/Rapport:   care explained   choices provided   reassurance provided   questions encouraged   questions answered  Taken 1/17/2025 0800 by Diana Clark RN  Trust Relationship/Rapport:   care explained   choices provided   reassurance provided   questions encouraged   questions answered  Goal: Readiness for Transition of Care  Outcome: Progressing     Problem: Fall Injury Risk  Goal: Absence of Fall and Fall-Related Injury  Outcome: Progressing  Intervention: Identify and Manage Contributors  Recent Flowsheet Documentation  Taken 1/17/2025 1600 by Diana Clark RN  Medication Review/Management:   high-risk medications identified   medications reviewed  Taken 1/17/2025 1200 by Diana Clark RN  Medication Review/Management:   high-risk medications identified   medications reviewed  Taken 1/17/2025 0800 by Diana Clark RN  Medication Review/Management:   high-risk medications identified   medications reviewed  Intervention: Promote Injury-Free Environment  Recent Flowsheet Documentation  Taken 1/17/2025 1600 by Diana Clark RN  Safety Promotion/Fall Prevention:   treat underlying cause   treat reversible contributory factors   supervised activity   safety round/check completed   room organization consistent   room near nurse's station   patient and family education   nonskid shoes/slippers when out of bed   mobility aid in reach   lighting adjusted   increase visualization of patient   increased rounding and observation   clutter free environment maintained   activity supervised   assistive device/personal items within reach  Taken 1/17/2025 1200 by Diana Clark, RN  Safety Promotion/Fall Prevention:   treat underlying cause   treat reversible contributory factors   supervised activity   safety round/check completed   room organization consistent   room near nurse's station   patient and family education   nonskid shoes/slippers when out of bed   mobility aid in reach   lighting  adjusted   increase visualization of patient   increased rounding and observation   clutter free environment maintained   activity supervised   assistive device/personal items within reach  Taken 1/17/2025 0800 by Diana Clark RN  Safety Promotion/Fall Prevention:   treat underlying cause   treat reversible contributory factors   supervised activity   safety round/check completed   room organization consistent   room near nurse's station   patient and family education   nonskid shoes/slippers when out of bed   mobility aid in reach   lighting adjusted   increase visualization of patient   increased rounding and observation   clutter free environment maintained   activity supervised   assistive device/personal items within reach     Problem: Pain Acute  Goal: Optimal Pain Control and Function  Outcome: Progressing  Intervention: Optimize Psychosocial Wellbeing  Recent Flowsheet Documentation  Taken 1/17/2025 1600 by Diana Clark RN  Supportive Measures:   active listening utilized   positive reinforcement provided   relaxation techniques promoted  Taken 1/17/2025 1200 by Diana Clark, RN  Supportive Measures:   active listening utilized   positive reinforcement provided   relaxation techniques promoted  Taken 1/17/2025 0800 by Diana Clark RN  Supportive Measures:   active listening utilized   positive reinforcement provided   relaxation techniques promoted  Intervention: Develop Pain Management Plan  Recent Flowsheet Documentation  Taken 1/17/2025 1600 by Diana Clark, RN  Pain Management Interventions:   pillow support provided   rest   repositioned   pain pump in use   pain management plan reviewed with patient/caregiver   quiet environment facilitated   emotional support   care clustered  Taken 1/17/2025 1200 by Diana Clark RN  Pain Management Interventions:   pillow support provided   rest   repositioned   pain pump in use   pain management plan reviewed with patient/caregiver    quiet environment facilitated   emotional support   care clustered  Taken 1/17/2025 0800 by Diana Clark RN  Pain Management Interventions:   pillow support provided   rest   repositioned   pain pump in use   pain management plan reviewed with patient/caregiver   quiet environment facilitated   emotional support   care clustered  Intervention: Prevent or Manage Pain  Recent Flowsheet Documentation  Taken 1/17/2025 1600 by Diana Clark RN  Sensory Stimulation Regulation:   care clustered   quiet environment promoted  Medication Review/Management:   high-risk medications identified   medications reviewed  Taken 1/17/2025 1200 by Diana Clark RN  Sensory Stimulation Regulation:   care clustered   quiet environment promoted  Medication Review/Management:   high-risk medications identified   medications reviewed  Taken 1/17/2025 0800 by Diana Clark RN  Sensory Stimulation Regulation:   care clustered   quiet environment promoted  Medication Review/Management:   high-risk medications identified   medications reviewed     Problem: Gas Exchange Impaired  Goal: Optimal Gas Exchange  Outcome: Progressing  Intervention: Optimize Oxygenation and Ventilation  Recent Flowsheet Documentation  Taken 1/17/2025 1600 by Diana Clark RN  Airway/Ventilation Management:   airway patency maintained   pulmonary hygiene promoted  Head of Bed (HOB) Positioning: HOB at 30 degrees  Taken 1/17/2025 1400 by Diana Clark RN  Head of Bed (HOB) Positioning: HOB at 30 degrees  Taken 1/17/2025 1200 by Diana Clark RN  Airway/Ventilation Management:   airway patency maintained   pulmonary hygiene promoted  Head of Bed (HOB) Positioning: HOB at 30 degrees  Taken 1/17/2025 1000 by Diana Clark RN  Head of Bed (HOB) Positioning: HOB at 30 degrees  Taken 1/17/2025 0800 by Diana Clark, RN  Airway/Ventilation Management:   airway patency maintained   pulmonary hygiene promoted  Head of Bed (HOB) Positioning: HOB  at 30 degrees     Problem: Restraint, Nonviolent  Goal: Absence of Harm or Injury  Outcome: Progressing  Intervention: Implement Least Restrictive Safety Strategies  Recent Flowsheet Documentation  Taken 1/17/2025 1600 by Diana Clark RN  Medical Device Protection:   IV pole/bag removed from visual field   tubing secured  Taken 1/17/2025 1200 by Diana Clark RN  Medical Device Protection:   IV pole/bag removed from visual field   tubing secured  Taken 1/17/2025 0800 by Diana Clark RN  Medical Device Protection:   IV pole/bag removed from visual field   tubing secured  Intervention: Protect Dignity, Rights and Personal Wellbeing  Recent Flowsheet Documentation  Taken 1/17/2025 1600 by Diana Clark RN  Trust Relationship/Rapport:   care explained   choices provided   reassurance provided   questions encouraged   questions answered  Taken 1/17/2025 1200 by Diana Clark RN  Trust Relationship/Rapport:   care explained   choices provided   reassurance provided   questions encouraged   questions answered  Taken 1/17/2025 0800 by Diana Clark RN  Trust Relationship/Rapport:   care explained   choices provided   reassurance provided   questions encouraged   questions answered  Intervention: Protect Skin and Joint Integrity  Recent Flowsheet Documentation  Taken 1/17/2025 1600 by Diana Clark RN  Body Position:   turned   log-rolled   legs elevated   heels elevated   weight shifting   upper extremity elevated   side-lying 30 degrees   lower extremity elevated  Skin Protection:   adhesive use limited   skin to device areas padded   silicone foam dressing in place   pulse oximeter probe site changed   tubing/devices free from skin contact   skin sealant/moisture barrier applied  Range of Motion: active ROM (range of motion) encouraged  Taken 1/17/2025 1400 by Diana Clark RN  Body Position:   turned   log-rolled   legs elevated   heels elevated   weight shifting   upper extremity  elevated   side-lying 30 degrees   lower extremity elevated  Taken 1/17/2025 1200 by Diana Clark RN  Body Position:   turned   log-rolled   legs elevated   heels elevated   weight shifting   upper extremity elevated   side-lying 30 degrees   lower extremity elevated  Skin Protection:   adhesive use limited   skin to device areas padded   silicone foam dressing in place   pulse oximeter probe site changed   tubing/devices free from skin contact   skin sealant/moisture barrier applied  Range of Motion: active ROM (range of motion) encouraged  Taken 1/17/2025 1000 by Diana Clark RN  Body Position:   turned   log-rolled   legs elevated   heels elevated   weight shifting   upper extremity elevated   side-lying 30 degrees   lower extremity elevated  Taken 1/17/2025 0800 by Diana Clark RN  Body Position:   turned   log-rolled   legs elevated   heels elevated   weight shifting   upper extremity elevated   side-lying 30 degrees   lower extremity elevated  Skin Protection:   adhesive use limited   skin to device areas padded   silicone foam dressing in place   pulse oximeter probe site changed   tubing/devices free from skin contact   skin sealant/moisture barrier applied  Range of Motion: active ROM (range of motion) encouraged     Problem: Risk for Delirium  Goal: Optimal Coping  Outcome: Progressing  Intervention: Optimize Psychosocial Adjustment to Delirium  Recent Flowsheet Documentation  Taken 1/17/2025 1600 by Diana Clark RN  Supportive Measures:   active listening utilized   positive reinforcement provided   relaxation techniques promoted  Taken 1/17/2025 1200 by Diana Clark RN  Supportive Measures:   active listening utilized   positive reinforcement provided   relaxation techniques promoted  Taken 1/17/2025 0800 by Diana Clark RN  Supportive Measures:   active listening utilized   positive reinforcement provided   relaxation techniques promoted  Goal: Improved Behavioral  Control  Outcome: Progressing  Intervention: Prevent and Manage Agitation  Recent Flowsheet Documentation  Taken 1/17/2025 1600 by Diana Clark RN  Environment Familiarity/Consistency: daily routine followed  Taken 1/17/2025 1200 by Diana Clark RN  Environment Familiarity/Consistency: daily routine followed  Taken 1/17/2025 0800 by Diana Clark RN  Environment Familiarity/Consistency: daily routine followed  Intervention: Minimize Safety Risk  Recent Flowsheet Documentation  Taken 1/17/2025 1600 by Diana Clark, RN  Enhanced Safety Measures:   room near unit station   review medications for side effects with activity   patient/family teach back on injury risk   pain management   monitor patients coagulation values   family to remain at bedside   assistive devices when indicated  Trust Relationship/Rapport:   care explained   choices provided   reassurance provided   questions encouraged   questions answered  Taken 1/17/2025 1200 by Diana Clark RN  Enhanced Safety Measures:   room near unit station   review medications for side effects with activity   patient/family teach back on injury risk   pain management   monitor patients coagulation values   family to remain at bedside   assistive devices when indicated  Trust Relationship/Rapport:   care explained   choices provided   reassurance provided   questions encouraged   questions answered  Taken 1/17/2025 0800 by Diana Clark RN  Enhanced Safety Measures:   room near unit station   review medications for side effects with activity   patient/family teach back on injury risk   pain management   monitor patients coagulation values   family to remain at bedside   assistive devices when indicated  Trust Relationship/Rapport:   care explained   choices provided   reassurance provided   questions encouraged   questions answered  Goal: Improved Attention and Thought Clarity  Outcome: Progressing  Intervention: Maximize Cognitive  Function  Recent Flowsheet Documentation  Taken 1/17/2025 1600 by Diana Clark RN  Sensory Stimulation Regulation:   care clustered   quiet environment promoted  Reorientation Measures:   reorientation provided   clock in view   calendar in view  Taken 1/17/2025 1200 by Diana Clark RN  Sensory Stimulation Regulation:   care clustered   quiet environment promoted  Reorientation Measures:   reorientation provided   clock in view   calendar in view  Taken 1/17/2025 0800 by Diana Clark RN  Sensory Stimulation Regulation:   care clustered   quiet environment promoted  Reorientation Measures:   reorientation provided   clock in view   calendar in view  Goal: Improved Sleep  Outcome: Progressing     Problem: Mechanical Ventilation Invasive  Goal: Effective Communication  Outcome: Progressing  Intervention: Ensure Effective Communication  Recent Flowsheet Documentation  Taken 1/17/2025 1600 by Diana Clark RN  Trust Relationship/Rapport:   care explained   choices provided   reassurance provided   questions encouraged   questions answered  Taken 1/17/2025 1200 by Diana Clark RN  Trust Relationship/Rapport:   care explained   choices provided   reassurance provided   questions encouraged   questions answered  Taken 1/17/2025 0800 by Diana Clark RN  Trust Relationship/Rapport:   care explained   choices provided   reassurance provided   questions encouraged   questions answered  Goal: Mechanical Ventilation Liberation  Outcome: Progressing  Intervention: Promote Extubation and Mechanical Ventilation Liberation  Recent Flowsheet Documentation  Taken 1/17/2025 1600 by Diana Clark RN  Medication Review/Management:   high-risk medications identified   medications reviewed  Environmental Support: calm environment promoted  Taken 1/17/2025 1200 by Diana Clark RN  Medication Review/Management:   high-risk medications identified   medications reviewed  Environmental Support: calm  environment promoted  Taken 1/17/2025 0800 by Diana Clark RN  Medication Review/Management:   high-risk medications identified   medications reviewed  Environmental Support: calm environment promoted  Goal: Optimal Nutrition Delivery  Outcome: Progressing  Intervention: Optimize Nutrition Delivery  Recent Flowsheet Documentation  Taken 1/17/2025 1600 by Diana Clark RN  Nutrition Support Management: weight trending reviewed  Taken 1/17/2025 1200 by Diana Clark RN  Nutrition Support Management: weight trending reviewed  Taken 1/17/2025 0800 by Diana Clark RN  Nutrition Support Management: weight trending reviewed  Goal: Absence of Device-Related Skin and Tissue Injury  Outcome: Progressing  Intervention: Maintain Skin and Tissue Health  Recent Flowsheet Documentation  Taken 1/17/2025 1600 by Diana Clark RN  Device Skin Pressure Protection:   absorbent pad utilized/changed   skin-to-device areas padded   tubing/devices free from skin contact   adhesive use limited  Taken 1/17/2025 1200 by Diana Clark RN  Device Skin Pressure Protection:   absorbent pad utilized/changed   skin-to-device areas padded   tubing/devices free from skin contact   adhesive use limited  Taken 1/17/2025 0800 by Diana Clark RN  Device Skin Pressure Protection:   absorbent pad utilized/changed   skin-to-device areas padded   tubing/devices free from skin contact   adhesive use limited  Goal: Absence of Ventilator-Induced Lung Injury  Outcome: Progressing  Intervention: Prevent Ventilator-Associated Pneumonia  Recent Flowsheet Documentation  Taken 1/17/2025 1600 by Diana Clark RN  VAP Prevention Bundle:   VTE prophylaxis provided   vent circuit breaks minimized   stress ulcer prophylaxis provided   HOB elevation maintained   oral care regularly provided   readiness to extubate assessed   spontaneous breathing trial performed  Oral Care:   oral rinse provided   suction provided   swabbed with  antiseptic solution  Head of Bed (HOB) Positioning: HOB at 30 degrees  VAP Prevention Contraindications: per provider order  VAP Prevention Measures: completed  Taken 1/17/2025 1400 by Diana Clark RN  Oral Care:   oral rinse provided   suction provided   swabbed with antiseptic solution  Head of Bed (HOB) Positioning: HOB at 30 degrees  Taken 1/17/2025 1200 by Diana Clark RN  VAP Prevention Bundle:   VTE prophylaxis provided   vent circuit breaks minimized   stress ulcer prophylaxis provided   HOB elevation maintained   oral care regularly provided   readiness to extubate assessed   spontaneous breathing trial performed  Oral Care:   oral rinse provided   suction provided   swabbed with antiseptic solution  Head of Bed (HOB) Positioning: \[facility] [place] [facility]\"" at 30 degrees  VAP Prevention Contraindications: per provider order  VAP Prevention Measures: completed  Taken 1/17/2025 1000 by Diana Clark RN  Oral Care:   oral rinse provided   suction provided   swabbed with antiseptic solution  Head of Bed (HOB) Positioning: \[facility] [place] [facility]\"" at 30 degrees  Taken 1/17/2025 0800 by Diana Clark RN  VAP Prevention Bundle:   VTE prophylaxis provided   vent circuit breaks minimized   stress ulcer prophylaxis provided   HOB elevation maintained   oral care regularly provided   readiness to extubate assessed   spontaneous breathing trial performed  Oral Care:   oral rinse provided   suction provided   swabbed with antiseptic solution  Head of Bed (HOB) Positioning: \[facility] [place] [facility]\"" at 30 degrees  VAP Prevention Contraindications: per provider order  VAP Prevention Measures: completed

## 2025-01-18 NOTE — PLAN OF CARE
Goal Outcome Evaluation:      Plan of Care Reviewed With: patient, family    Overall Patient Progress: improvingOverall Patient Progress: improving    Outcome Evaluation: Patient is intubated. Able to follow commands. On low dose precedex gtt. Switched to full vent support overnight. Anuric on HD. Plan for HD run today.      Problem: Adult Inpatient Plan of Care  Goal: Plan of Care Review  Description: The Plan of Care Review/Shift note should be completed every shift.  The Outcome Evaluation is a brief statement about your assessment that the patient is improving, declining, or no change.  This information will be displayed automatically on your shift  note.  Outcome: Progressing  Flowsheets (Taken 1/18/2025 0441)  Outcome Evaluation: Patient is intubated. Able to follow commands. On low dose precedex gtt. Switched to full vent support overnight. Anuric on HD. Plan for HD run today.  Plan of Care Reviewed With:   patient   family  Overall Patient Progress: improving     Problem: Gas Exchange Impaired  Goal: Optimal Gas Exchange  Outcome: Progressing  Intervention: Optimize Oxygenation and Ventilation  Recent Flowsheet Documentation  Taken 1/18/2025 0400 by Carly Robb RN  Head of Bed (HOB) Positioning: HOB at 30 degrees  Taken 1/18/2025 0200 by Carly Robb RN  Head of Bed (HOB) Positioning: HOB at 30 degrees  Taken 1/18/2025 0000 by Carly Robb RN  Head of Bed (HOB) Positioning: HOB at 30 degrees  Taken 1/17/2025 2200 by Carly Robb RN  Head of Bed (HOB) Positioning: HOB at 30 degrees  Taken 1/17/2025 2000 by Carly Robb RN  Head of Bed (HOB) Positioning: HOB at 30 degrees     Problem: Mechanical Ventilation Invasive  Goal: Effective Communication  Outcome: Progressing  Intervention: Ensure Effective Communication  Recent Flowsheet Documentation  Taken 1/17/2025 2000 by Carly Robb RN  Trust Relationship/Rapport: care explained  Goal: Mechanical Ventilation  Liberation  Outcome: Progressing  Intervention: Promote Extubation and Mechanical Ventilation Liberation  Recent Flowsheet Documentation  Taken 1/18/2025 0400 by Carly Robb RN  Medication Review/Management:   high-risk medications identified   medications reviewed  Environmental Support: calm environment promoted  Taken 1/18/2025 0000 by Carly Robb RN  Medication Review/Management:   high-risk medications identified   medications reviewed  Environmental Support: calm environment promoted  Taken 1/17/2025 2000 by Carly Robb RN  Medication Review/Management:   high-risk medications identified   medications reviewed  Goal: Optimal Nutrition Delivery  Outcome: Progressing  Goal: Absence of Device-Related Skin and Tissue Injury  Outcome: Progressing  Intervention: Maintain Skin and Tissue Health  Recent Flowsheet Documentation  Taken 1/18/2025 0400 by Carly Robb RN  Device Skin Pressure Protection:   absorbent pad utilized/changed   skin-to-device areas padded   tubing/devices free from skin contact   adhesive use limited  Taken 1/18/2025 0000 by Carly Robb RN  Device Skin Pressure Protection:   absorbent pad utilized/changed   skin-to-device areas padded   tubing/devices free from skin contact   adhesive use limited  Taken 1/17/2025 2000 by Carly Robb RN  Device Skin Pressure Protection:   absorbent pad utilized/changed   skin-to-device areas padded   tubing/devices free from skin contact   adhesive use limited  Goal: Absence of Ventilator-Induced Lung Injury  Outcome: Progressing  Intervention: Prevent Ventilator-Associated Pneumonia  Recent Flowsheet Documentation  Taken 1/18/2025 0400 by Carly Robb RN  Oral Care:   swabbed with antiseptic solution   suction provided  Head of Bed (HOB) Positioning: HOB at 30 degrees  Taken 1/18/2025 0200 by Carly Robb RN  Oral Care:   swabbed with antiseptic solution   suction provided  Head of Bed (HOB)  Positioning: HOB at 30 degrees  Taken 1/18/2025 0000 by Carly Robb, RN  Oral Care:   swabbed with antiseptic solution   suction provided  Head of Bed (HOB) Positioning: HOB at 30 degrees  Taken 1/17/2025 2200 by Carly Robb, RN  Oral Care:   swabbed with antiseptic solution   suction provided  Head of Bed (HOB) Positioning: HOB at 30 degrees  Taken 1/17/2025 2000 by Carly Robb, RN  Oral Care:   oral rinse provided   suction provided   swabbed with antiseptic solution  Head of Bed (HOB) Positioning: HOB at 30 degrees

## 2025-01-18 NOTE — PROGRESS NOTES
CRITICAL CARE PROGRESS NOTE   [facility] INTENSIVE CARE  [address]  [place] [address]  Phone: [phone]    Patient:  Supriya Herr, Age 75 year old, Date of birth 1950, MRN# [id number]  Date of Visit:  01/18/2025  Referring Provider No ref. provider found      Assessment and plan :     Supriya Herr is a 74 year old female with a history of ESRD on HD, CHF, COPD, DM type II, hypertension who was admitted 1/8 with influenza A. This morning during dialysis, an RRT was called for hypoxia and she was intubated. The patient was admitted to the ICU for medical management.       Neuro:  # Sedation for mechanical ventilation  # Likely baseline cognitive impairment  - Precedex for sedation. Daily sedation holidays  - RASS goal of 0 to -1  - PTA Zoloft, gabapentin     CV:  # Shock, septic vs polypharmacy (resolved)  # Hx of CHF  # Hx of HTN, HLD  # Afib RVR  - MAP goal > 65, not requiring pressors  - Has been on amidarone gtt, switch to PO today  - Given high CHADS Vasc score and persistent Afib started on IV heparin gtt  - Hold home amlodipine, carvedilol    1/18: run of afib w rvr. Add coreg today     Pulm:  # Acute hypoxemic respiratory failure  # Flu A  # COPD exacerbation  # JONE  - low tidal volume ventilation  - BLE Doppler US negative 1/9  - CTA chest 1/16 negative for PE but with b/l effusions and basilar atelectasis  - Had 2L removed with iHD yesterday  - S/p 5 days of abx coverage  - Decrease methylpred from 40 q8 to q12  - Continue nebs  - Cont pulmicort nebs BID  - Appreciate nephrology help with fluid removal via iHD, can support with NE to help with fluid removal    1/18: doing well with SBT. Still somnolent. Will cont to work on waking her up. Cont current steroid dose.      GI:  - RD to manage TF     Renal:  # ESRD on HD  - Nephrology consult, appreciate recs  - Fluid removal with iHD  - Can use low dose pressors as  needed to help with fluid removal during iHD     ID:  # Influenza A pneumonia  # ? Community acquired pneumonia   - sputum culture no growth   - to complete 10 days of Oseltamivir today   - S/p 5 days of cefepime       Heme:  #Chronic anemia   - Hgb baseline 9-10   - Transfuse for Hgb < 7     # Afib new onset  - cont heparin gtt     Endo:  # Hx of DMII  - rising hyperglycemia in the setting of steroids  - start insulin gtt, once steroids are weaned can switch back to sliding scale insulin +/- glargine     PPx:  1. DVT: heparin  2. VAP: HOB 30 degrees, chlorhexidine rinse  3. Stress Ulcer: PPI  4. Restraints: Nonviolent soft two point restraints required and necessary for patient safety and continued cares and good effect as patient continues to pull at necessary lines, tubes despite education and distraction. Will readdress daily.   5. Wound care - per unit routine   6. Feeding - TF    I am managing these acute and ongoing critical issues resulting in critical condition that impairs one or more vital organ systems, incur a high probability of imminent or life-threatening deterioration in the patient's condition and providing frequent personal assessment and manipulation of medications and life support equipment.     I have discussed the patient in detail and I agree with the findings, assessment, and plan as documented when this note was signed/cosigned on this day. The plan was formulated in conjunction with pharmacy, ICU nurses, and respiratory therapist. I have evaluated all laboratory values and imaging studies for the past 24 hours. I have reviewed all the consults that have been ordered and are active for this patient.      Critical Care Time: 30 min.  I spent this time (excluding procedures) personally providing and directing critical care services at the bedside and on the critical care unit.      Ravin King MD  Associate Professor of Medicine  Section of Interventional Pulmonology   Division of  "Pulmonary, Allergy, Critical Care and Sleep Medicine   [facility], [employer]  Pager: [phone]   Office: [phone]  Email: [email or web address]     Clinically Significant Risk Factors         # Hyponatremia: Lowest Na = 131 mmol/L in last 2 days, will monitor as appropriate  # Hypochloremia: Lowest Cl = 89 mmol/L in last 2 days, will monitor as appropriate      # Hypoalbuminemia: Lowest albumin = 2.8 g/dL at 1/13/2025  5:14 AM, will monitor as appropriate     # Hypertension: Noted on problem list           # DMII: A1C = 6.5 % (Ref range: <5.7 %) within past 6 months   # Obesity: Estimated body mass index is 34.94 kg/m  as calculated from the following:    Height as of this encounter: 1.702 m (5' 7\").    Weight as of this encounter: 101.2 kg (223 lb 1.7 oz).      # Financial/Environmental Concerns: none                  Overnight Events   No acute issues overnight       Lines/Tubes/Draines/Devices   .  Peripheral IV 01/09/25 Anterior;Right Lower forearm (Active)   Site Assessment [facility] 01/18/25 0400   Line Status Infusing 01/18/25 0400   Dressing Transparent 01/18/25 0400   Dressing Status clean;dry;intact 01/18/25 0400   Line Intervention Flushed 01/10/25 0800   Phlebitis Scale 0-->no symptoms 01/18/25 0400   Infiltration? no 01/18/25 0400   Number of days: 9       Peripheral IV 01/16/25 Anterior;Right Lower forearm (Active)   Site Assessment [facility] 01/18/25 0400   Line Status Infusing 01/18/25 0400   Dressing Transparent 01/18/25 0400   Dressing Status clean;dry;intact 01/18/25 0400   Dressing Intervention New dressing  01/16/25 1356   Line Intervention Flushed 01/17/25 0800   Phlebitis Scale 0-->no symptoms 01/18/25 0400   Infiltration? no 01/17/25 1600   Number of days: 2       Hemodialysis Vascular Access Arteriovenous graft Left Forearm (Active)   Number of days: 1717       Hemodialysis Vascular Access AV fistula Superior Arm (Active)   Site Assessment [facility] 01/18/25 0800   Cannulation Needle Size 15 " 01/16/25 1220   Dressing Intervention Removed 01/15/25 0400   Dressing Status Clean, dry, intact 01/17/25 1600   Hand Off Report Yes 01/16/25 1545   Number of days:        ETT Cuffed 7 mm (Active)   Secured at (cm) 22 cm 01/18/25 1036   Measured from Teeth/Gums 01/18/25 1036   Position Left 01/18/25 0800   Secured by Commercial tube cardoso 01/18/25 1036   Bite Block None Present 01/18/25 1036   Site Appearance Dry;Clean 01/18/25 1036   Tube Care Site care done 01/18/25 0800   Cuff Assessment Minimal occluding volume 01/18/25 0259   Safety Measures Manual resuscitator at bedside 01/18/25 1036   Number of days: 9       NG/OG/NJ Tube Orogastric Right mouth (Active)   Site Description WDL 01/18/25 0800   Status Enteral Feedings 01/18/25 0800   Placement Confirmation Assumption unchanged;Respiratory status unchanged 01/18/25 0800   Securement Device Charting Secured to other stabilized tube (NJ, ETT, etc.) 01/18/25 0800   Assumption (cm marking) at nare/mouth 63 cm 01/18/25 0800   Intake (ml) 150 ml 01/18/25 0800   Flush/Free Water (mL) 30 mL 01/18/25 0800   Number of days: 9       Wound Coccyx (Active)   Number of days: 1373       Wound Buttocks Pressure injury community acquired (Active)   Wound Bed [place];Pale 01/18/25 0800   Drainage Amount None 01/18/25 0800   Wound Care/Cleansing Barrier applied  01/18/25 0200   Dressing Foam 01/18/25 0800   Dressing Status Clean, dry, intact 01/18/25 0800   Number of days: 8       Wound Breast (Active)   Wound Bed Moist;[place] 01/18/25 0800   Roxy-wound Assessment Warm 01/17/25 2000   Drainage Amount None 01/18/25 0800   Drainage Color/Characteristics Serosanguineous 01/12/25 0000   Wound Care/Cleansing Medication applied - see MAR 01/18/25 0800   Dressing Foam 01/18/25 0800   Dressing Status Clean, dry, intact 01/17/25 0800   Number of days: 8       Rash 11/12/17 1700 Bilateral posterior thoracic spine macular (Active)   Number of days: 2624       Rash 01/10/25 0000 anterior groin  (Active)   Distribution localized 01/11/25 0800   Color red 01/11/25 0800   Configuration/Shape asymmetric 01/11/25 0800   Borders irregular 01/11/25 0800   Care, Rash cleansed with;soap and water;ointment/cream applied 01/10/25 0800   Number of days: 8       Rash 01/10/25 0000 Left lower breast (Active)   Distribution localized 01/10/25 1800   Color red 01/10/25 1800   Care, Rash cleansed with;ointment/cream applied;soap and water 01/10/25 0800   Number of days: 8       Rash 01/10/25 0000 Right lower breast (Active)   Distribution localized 01/10/25 1800   Color red 01/10/25 1800   Care, Rash cleansed with;soap and water;ointment/cream applied 01/10/25 0800   Number of days: 8       Wound (used by OP [facility] only) 01/17/24 1532 Left gluteal pressure injury (Active)   Number of days: 367          ICU Prophylaxis:   1. DVT: Hep Subq/ LMWH/mechanical  2. VAP: HOB 30 degrees, chlorhexidine rinse  3. Stress Ulcer: PPI/H2 blocker  4. Restraints: Nonviolent soft two point restraints required and necessary for patient safety and continued cares and good effect as patient continues to pull at necessary lines, tubes despite education and distraction. Will readdress daily.   5. IV Access - central access required and necessary for continued patient cares  6. Feeding - enteral      Medications:     Current Facility-Administered Medications   Medication Dose Route Frequency Provider Last Rate Last Admin    - MEDICATION INSTRUCTIONS for Dialysis Patients -   Does not apply See Admin Instructions Dusty Prieto MD        amiodarone (PACERONE) tablet 400 mg  400 mg Oral or FT or NG tube BID Cheo Watt MD   400 mg at 01/18/25 0958    Followed by    [START ON 1/20/2025] amiodarone (PACERONE) tablet 200 mg  200 mg Oral or FT or NG tube BID Cheo Watt MD        Followed by    [START ON 1/23/2025] amiodarone (PACERONE) tablet 200 mg  200 mg Oral or FT or NG tube Daily Cheo Watt MD        [Held by provider] amLODIPine (NORVASC)  tablet 5 mg  5 mg Oral BID Marcell Grubbs PA-C   5 mg at 01/08/25 2217    atorvastatin (LIPITOR) tablet 20 mg  20 mg Oral or Feeding Tube QPM Kamryn Peguero MD   20 mg at 01/17/25 1930    B and C vitamin Complex with folic acid (NEPHRONEX) liquid 5 mL  5 mL Per Feeding Tube Daily Truman Moreland MD   5 mL at 01/18/25 0959    budesonide (PULMICORT) neb solution 1 mg  1 mg Nebulization BID Cheo Watt MD   1 mg at 01/17/25 1944    carvedilol (COREG) tablet 12.5 mg  12.5 mg Oral or Feeding Tube BID w/meals Ravin King MD   12.5 mg at 01/18/25 1233    [Held by provider] carvedilol (COREG) tablet 25 mg  25 mg Oral BID w/meals Marcell Grubbs PA-C   25 mg at 01/08/25 1905    cetirizine (zyrTEC) tablet 5 mg  5 mg Oral or Feeding Tube At Bedtime Kamryn Peguero MD   5 mg at 01/17/25 2158    chlorhexidine (PERIDEX) 0.12 % solution 15 mL  15 mL Mouth/Throat Q12H Truman Moreland MD   15 mL at 01/18/25 0958    epoetin candy-epbx (RETACRIT) injection 10,000 Units  10,000 Units Intravenous Once in dialysis/CRRT Aime Langley, DO        [Held by provider] furosemide (LASIX) tablet 40 mg  40 mg Oral Once per day on Tuesday Thursday Saturday Marcell Grubbs PA-C        And    [Held by provider] furosemide (LASIX) tablet 80 mg  80 mg Oral Once per day on Tuesday Thursday Saturday Marcell Grubbs PA-C        furosemide (LASIX) tablet 80 mg  80 mg Oral or NG Tube BID Cheo Watt MD   80 mg at 01/18/25 0958    gabapentin (NEURONTIN) solution 100 mg  100 mg Oral or Feeding Tube Daily Kamryn Peguero MD   100 mg at 01/18/25 1007    ipratropium - albuterol 0.5 mg/2.5 mg/3 mL (DUONEB) neb solution 3 mL  3 mL Nebulization 4x daily Marcell Grubbs, ILA   3 mL at 01/18/25 1036    methylPREDNISolone sodium succinate (SOLU-MEDROL) injection 40 mg  40 mg Intravenous Q12H Cheo Watt MD   40 mg at 01/18/25 0610    No heparin via hemodialysis machine   Does not apply Once Aime Langley, DO         pantoprazole (PROTONIX) 2 mg/mL suspension 40 mg  40 mg Per Feeding Tube [facility] Truman Moreland MD   40 mg at 01/16/25 0821    Or    pantoprazole (PROTONIX) IV push injection 40 mg  40 mg Intravenous [facility] Truman Moreland MD   40 mg at 01/18/25 1007    sertraline (ZOLOFT) tablet 75 mg  75 mg Oral or Feeding Tube QPM Kamryn Peguero MD   75 mg at 01/17/25 1930    sevelamer carbonate (RENVELA) tablet 800 mg  800 mg Oral or Feeding Tube TID w/meals Kamryn Peguero MD   800 mg at 01/18/25 1233    sodium chloride (PF) 0.9% PF flush 3 mL  3 mL Intracatheter Q8H Marcell Grubbs PA-C   3 mL at 01/17/25 1930    sodium chloride 0.9% BOLUS 200 mL  200 mL Hemodialysis Machine Once Aime Langley DO        sodium chloride 0.9% BOLUS 250 mL  250 mL Intravenous Once in dialysis/CRRT Aime Langley DO        sodium chloride 0.9% BOLUS 500 mL  500 mL Hemodialysis Machine Once Aime Langley DO        tacrolimus (PROTOPIC) 0.1 % ointment   Topical BID Marcell Grubbs PA-C   Given at 01/18/25 1005    vitamin D3 (CHOLECALCIFEROL) tablet 50 mcg  50 mcg Oral or Feeding Tube Daily Kamryn Peguero MD   50 mcg at 01/18/25 0958     Current Facility-Administered Medications   Medication Dose Route Frequency Provider Last Rate Last Admin    acetaminophen (TYLENOL) tablet 650 mg  650 mg Oral Q4H PRN Marcell Grubbs PA-C   650 mg at 01/16/25 2048    Or    acetaminophen (TYLENOL) Suppository 650 mg  650 mg Rectal Q4H PRN Marcell Grubbs PA-C        albuterol (PROVENTIL HFA/VENTOLIN HFA) inhaler  2 puff Inhalation Q6H PRN Marcell Grubbs PA-C   2 puff at 01/09/25 0021    benzonatate (TESSALON) capsule 100 mg  100 mg Oral TID PRN Marcell Grubbs PA-C   100 mg at 01/08/25 9014    calcium carbonate (TUMS) chewable tablet 1,000 mg  1,000 mg Oral 4x Daily PRN Marcell Grubbs, PA-C        carboxymethylcellulose PF (REFRESH PLUS) 0.5 % ophthalmic solution 1 drop  1 drop Both Eyes Q1H PRN Ted Proctor, PENELOPE CNP         dextrose 10% infusion   Intravenous Continuous PRN Cheo Watt MD        dextrose 10% infusion   Intravenous Continuous PRN Truman Moreland MD        glucose gel 15-30 g  15-30 g Oral Q15 Min PRN Cheo Watt MD        Or    dextrose 50 % injection 25-50 mL  25-50 mL Intravenous Q15 Min PRN Cheo Watt MD        Or    glucagon injection 1 mg  1 mg Subcutaneous Q15 Min PRN Cheo Watt MD        fentaNYL (PF) (SUBLIMAZE) injection 25-50 mcg  25-50 mcg Intravenous Q1H PRN Cheo Watt MD   25 mcg at 01/17/25 0147    lidocaine (LMX4) cream   Topical Q1H PRN Marcell Grubbs PA-C        lidocaine 1 % 0.1-1 mL  0.1-1 mL Other Q1H PRN Marcell Grubbs PA-C        melatonin tablet 1 mg  1 mg Oral At Bedtime PRN Marcell Grubbs PA-C        miconazole (MICATIN) 2 % powder   Topical BID PRN Marcell Grubbs PA-C        naloxone (NARCAN) injection 0.2 mg  0.2 mg Intravenous Q2 Min PRN Cheo Watt MD        Or    naloxone (NARCAN) injection 0.4 mg  0.4 mg Intravenous Q2 Min PRN Cheo Watt MD        Or    naloxone (NARCAN) injection 0.2 mg  0.2 mg Intramuscular Q2 Min PRN Cheo Watt MD        Or    naloxone (NARCAN) injection 0.4 mg  0.4 mg Intramuscular Q2 Min PRN Cheo Watt MD        No lozenges or gum should be given while patient on BIPAP/AVAPS/AVAPS AE   Does not apply Continuous PRN Ted Proctor APRN CNP        ondansetron (ZOFRAN ODT) ODT tab 4 mg  4 mg Oral Q6H PRN Marcell Grubbs PA-C        Or    ondansetron (ZOFRAN) injection 4 mg  4 mg Intravenous Q6H PRN Marcell Grubbs PA-C        Patient may continue current oral medications   Does not apply Continuous PRN Ted Proctor APRN CNP        senna-docusate (SENOKOT-S/PERICOLACE) 8.6-50 MG per tablet 1 tablet  1 tablet Oral BID PRN Marcell Grubbs PA-C   1 tablet at 01/13/25 1412    Or    senna-docusate (SENOKOT-S/PERICOLACE) 8.6-50 MG per tablet 2 tablet  2 tablet Oral BID PRMarcell Badillo PA-C   2 tablet at 01/14/25 0750    sodium  chloride (PF) 0.9% PF flush 3 mL  3 mL Intracatheter q1 min prn Marcell Grubbs PA-C        sodium chloride 0.9% BOLUS 100-150 mL  100-150 mL Intravenous Q15 Min PRN Aime Langley DO             Review of Systems:   Unable to obtain due to critical illness          Physical Exam:   Temp:  [96.6  F (35.9  C)-98.7  F (37.1  C)] 98.4  F (36.9  C)  Pulse:  [] 114  Resp:  [12-41] 30  BP: (109-147)/(42-77) 118/44  FiO2 (%):  [30 %] 30 %  SpO2:  [96 %-100 %] 99 %    Intake/Output Summary (Last 24 hours) at 1/18/2025 1240  Last data filed at 1/18/2025 1200  Gross per 24 hour   Intake 2216.16 ml   Output --   Net 2216.16 ml     Wt Readings from Last 4 Encounters:   01/18/25 101.2 kg (223 lb 1.7 oz)   06/14/24 101.1 kg (222 lb 14.2 oz)   06/03/24 101.1 kg (222 lb 14.2 oz)   04/24/24 101.1 kg (222 lb 14.2 oz)     BP - Mean:  [] 67  FiO2 (%): 30 %, Resp: 30, Vent Mode: CPAP/PS, Resp Rate (Set): 12 breaths/min, Tidal Volume (Set, mL): 430 mL, PEEP (cm H2O): 8 cmH2O, Pressure Support (cm H2O): 10 cmH2O, Resp Rate (Set): 12 breaths/min, Tidal Volume (Set, mL): 430 mL, PEEP (cm H2O): 8 cmH2O  Recent Labs   Lab 01/17/25  1213 01/16/25  0901   O2PER 30 30       GEN: no acute distress   HEENT: head ncat, sclera anicteric, OP patent, trachea midline   PULM: unlabored synchronous with vent, clear anteriorly    CV/COR: RRR S1S2 no gallop,  No rub, no murmur  ABD: soft nontender, hypoactive bowel sounds, no mass  EXT:  Edema   warm  NEURO: grossly intact  SKIN: no obvious rash      Data:   All data and imaging reviewed

## 2025-01-18 NOTE — PROGRESS NOTES
Potassium   Date Value Ref Range Status   01/18/2025 4.9 3.4 - 5.3 mmol/L Final   02/02/2022 4.7 3.4 - 5.3 mmol/L Final   04/17/2021 4.2 3.4 - 5.3 mmol/L Final     Hemoglobin   Date Value Ref Range Status   01/18/2025 9.2 (L) 11.7 - 15.7 g/dL Final   04/16/2021 10.9 (L) 11.7 - 15.7 g/dL Final     Creatinine   Date Value Ref Range Status   01/18/2025 3.97 (H) 0.51 - 0.95 mg/dL Final   04/17/2021 5.98 (H) 0.52 - 1.04 mg/dL Final     Urea Nitrogen   Date Value Ref Range Status   01/18/2025 86.3 (H) 8.0 - 23.0 mg/dL Final   11/24/2021 37 (H) 7 - 30 mg/dL Final   04/17/2021 68 (H) 7 - 30 mg/dL Final     Sodium   Date Value Ref Range Status   01/18/2025 131 (L) 135 - 145 mmol/L Final   04/17/2021 137 133 - 144 mmol/L Final     INR   Date Value Ref Range Status   04/24/2024 1.11 0.85 - 1.15 Final   04/16/2021 1.14 0.86 - 1.14 Final       DIALYSIS PROCEDURE NOTE  Hepatitis status of previous patient on machine log was checked and verified ok to use with this patients hepatitis status.  Patient dialyzed for 3.5 hrs. on a K2 bath with a net fluid removal of  3L.  A BFR of 400 ml/min was obtained via a left AVG using 15 gauge needles.      The treatment plan was discussed with Dr. Langley during the treatment.    Total heparin received during the treatment: 0 units.   Needle cannulation sites held x 10 min.       Meds  given: Epogen 10,000   Complications: Tolerated treatment with no issues       Person educated: patient. Knowledge base substantial. Barriers to learning: intubated, slighly sedated but able to nod head to questions. Educated on procedure via verbal mode.     ICEBOAT? Timeout performed pre-treatment  I: Patient was identified using 2 identifiers  C:  Consent Signed Yes  E: Equipment preventative maintenance is current and dialysis delivery system OK to use  B:    Latest Reference Range & Units 01/08/25 11:51 01/09/25 01:51   Hep B Surface Agn Nonreactive  Nonreactive    Hepatitis B Surface Antibody Instrument  Value <8.5 m[IU]/mL  <3.50   Hepatitis B Surface Antibody   Nonreactive     O: Dialysis orders present and complete prior to treatment  A: Vascular access verified and assessed prior to treatment  T: Treatment was performed at a clinically appropriate time  ?: Patient was allowed to ask questions and address concerns prior to treatment  See Adult Hemodialysis flowsheet in [facility] for further details and post assessment.  Machine water alarm in place and functioning. Transducer pods intact and checked every 15min.   .  Chlorine/Chloramine water system checked every 4 hours.  Outpatient Dialysis at [facility]    Patient repositioned every 2 hours during the treatment.  Post treatment report given to SHANTA Ortiz regarding 3L of fluid removed, last BP of 145/78    Please remove patient dressing on AVF and AVG needle sites 24 hours after dialysis. If leaking occurs please apply a Band-Aid.

## 2025-01-18 NOTE — PROGRESS NOTES
[facility] ICU RESPIRATORY NOTE        Date of Admission: 1/8/2025     Date of Intubation (most recent): 1/9/2025    Reason for Mechanical Ventilation: Airway protection     Number of Days on Mechanical Ventilation: 10    Met Criteria for Spontaneous Breathing Trial: Yes      Bite Block: No    Significant Events Today: PS 10/8 for 8 hours     ABG Results:   Recent Labs   Lab 01/17/25  1213 01/16/25  0901   O2PER 30 30         Current Vent Settings: FiO2 (%): 30 %, Resp: 17, Vent Mode: CMV/AC, Resp Rate (Set): 12 breaths/min, Tidal Volume (Set, mL): 430 mL, PEEP (cm H2O): 8 cmH2O, Pressure Support (cm H2O): 10 cmH2O, Resp Rate (Set): 12 breaths/min, Tidal Volume (Set, mL): 430 mL, PEEP (cm H2O): 8 cmH2O    Plan: Continue full vent support and wean as tolerated     Emilie Beauchamp RT on 1/18/2025 at 5:32 PM

## 2025-01-18 NOTE — PROGRESS NOTES
[facility] ICU RESPIRATORY NOTE      Date of Admission: 1/8/2025     Date of Intubation (most recent): 01/09/2025     Reason for Mechanical Ventilation: Airway Protection     Number of Days on Mechanical Ventilation: 10     Met Criteria for Spontaneous Breathing Trial: Yes - 10/8, 30% from 1600 -1945     Bite Block: No     Significant Events Today: None      ABG Results:   Recent Labs   Lab 01/17/25  1213 01/16/25  0901   O2PER 30 30         Current Vent Settings: FiO2 (%): 30 %, Resp: 20, Vent Mode: CMV/AC, Resp Rate (Set): 12 breaths/min, Tidal Volume (Set, mL): 430 mL, PEEP (cm H2O): 5 cmH2O, Pressure Support (cm H2O): 10 cmH2O, Resp Rate (Set): 12 breaths/min, Tidal Volume (Set, mL): 430 mL, PEEP (cm H2O): 5 cmH2O    Annetta Westfall, RT on 1/18/2025 at 4:38 AM

## 2025-01-19 ENCOUNTER — APPOINTMENT (OUTPATIENT)
Dept: SPEECH THERAPY | Facility: CLINIC | Age: 75
DRG: 207 | End: 2025-01-19
Attending: INTERNAL MEDICINE
Payer: COMMERCIAL

## 2025-01-19 ENCOUNTER — APPOINTMENT (OUTPATIENT)
Dept: PHYSICAL THERAPY | Facility: CLINIC | Age: 75
DRG: 207 | End: 2025-01-19
Attending: INTERNAL MEDICINE
Payer: COMMERCIAL

## 2025-01-19 LAB
ANION GAP SERPL CALCULATED.3IONS-SCNC: 13 MMOL/L (ref 7–15)
ANION GAP SERPL CALCULATED.3IONS-SCNC: 14 MMOL/L (ref 7–15)
BUN SERPL-MCNC: 55.3 MG/DL (ref 8–23)
BUN SERPL-MCNC: 72.6 MG/DL (ref 8–23)
CALCIUM SERPL-MCNC: 10.1 MG/DL (ref 8.8–10.4)
CALCIUM SERPL-MCNC: 10.2 MG/DL (ref 8.8–10.4)
CHLORIDE SERPL-SCNC: 92 MMOL/L (ref 98–107)
CHLORIDE SERPL-SCNC: 93 MMOL/L (ref 98–107)
CREAT SERPL-MCNC: 2.9 MG/DL (ref 0.51–0.95)
CREAT SERPL-MCNC: 4.05 MG/DL (ref 0.51–0.95)
EGFRCR SERPLBLD CKD-EPI 2021: 11 ML/MIN/1.73M2
EGFRCR SERPLBLD CKD-EPI 2021: 16 ML/MIN/1.73M2
ERYTHROCYTE [DISTWIDTH] IN BLOOD BY AUTOMATED COUNT: 16.8 % (ref 10–15)
GLUCOSE BLDC GLUCOMTR-MCNC: 101 MG/DL (ref 70–99)
GLUCOSE BLDC GLUCOMTR-MCNC: 114 MG/DL (ref 70–99)
GLUCOSE BLDC GLUCOMTR-MCNC: 115 MG/DL (ref 70–99)
GLUCOSE BLDC GLUCOMTR-MCNC: 117 MG/DL (ref 70–99)
GLUCOSE BLDC GLUCOMTR-MCNC: 121 MG/DL (ref 70–99)
GLUCOSE BLDC GLUCOMTR-MCNC: 122 MG/DL (ref 70–99)
GLUCOSE BLDC GLUCOMTR-MCNC: 137 MG/DL (ref 70–99)
GLUCOSE BLDC GLUCOMTR-MCNC: 145 MG/DL (ref 70–99)
GLUCOSE BLDC GLUCOMTR-MCNC: 145 MG/DL (ref 70–99)
GLUCOSE SERPL-MCNC: 123 MG/DL (ref 70–99)
GLUCOSE SERPL-MCNC: 138 MG/DL (ref 70–99)
HCO3 SERPL-SCNC: 26 MMOL/L (ref 22–29)
HCO3 SERPL-SCNC: 28 MMOL/L (ref 22–29)
HCT VFR BLD AUTO: 28.7 % (ref 35–47)
HGB BLD-MCNC: 9.3 G/DL (ref 11.7–15.7)
MCH RBC QN AUTO: 31.6 PG (ref 26.5–33)
MCHC RBC AUTO-ENTMCNC: 32.4 G/DL (ref 31.5–36.5)
MCV RBC AUTO: 98 FL (ref 78–100)
PLATELET # BLD AUTO: 268 10E3/UL (ref 150–450)
POTASSIUM SERPL-SCNC: 4.6 MMOL/L (ref 3.4–5.3)
POTASSIUM SERPL-SCNC: 4.7 MMOL/L (ref 3.4–5.3)
RBC # BLD AUTO: 2.94 10E6/UL (ref 3.8–5.2)
SODIUM SERPL-SCNC: 133 MMOL/L (ref 135–145)
SODIUM SERPL-SCNC: 133 MMOL/L (ref 135–145)
UFH PPP CHRO-ACNC: 0.32 IU/ML
UFH PPP CHRO-ACNC: 0.38 IU/ML
UFH PPP CHRO-ACNC: 0.51 IU/ML
VIT B12 SERPL-MCNC: 815 PG/ML (ref 232–1245)
WBC # BLD AUTO: 14.9 10E3/UL (ref 4–11)

## 2025-01-19 PROCEDURE — 85520 HEPARIN ASSAY: CPT | Performed by: INTERNAL MEDICINE

## 2025-01-19 PROCEDURE — 85014 HEMATOCRIT: CPT | Performed by: STUDENT IN AN ORGANIZED HEALTH CARE EDUCATION/TRAINING PROGRAM

## 2025-01-19 PROCEDURE — 80048 BASIC METABOLIC PNL TOTAL CA: CPT | Performed by: INTERNAL MEDICINE

## 2025-01-19 PROCEDURE — 82310 ASSAY OF CALCIUM: CPT | Performed by: STUDENT IN AN ORGANIZED HEALTH CARE EDUCATION/TRAINING PROGRAM

## 2025-01-19 PROCEDURE — 99233 SBSQ HOSP IP/OBS HIGH 50: CPT | Performed by: INTERNAL MEDICINE

## 2025-01-19 PROCEDURE — 250N000009 HC RX 250: Performed by: INTERNAL MEDICINE

## 2025-01-19 PROCEDURE — 250N000009 HC RX 250: Performed by: STUDENT IN AN ORGANIZED HEALTH CARE EDUCATION/TRAINING PROGRAM

## 2025-01-19 PROCEDURE — 200N000001 HC R&B ICU

## 2025-01-19 PROCEDURE — 97162 PT EVAL MOD COMPLEX 30 MIN: CPT | Mod: GP

## 2025-01-19 PROCEDURE — 94640 AIRWAY INHALATION TREATMENT: CPT | Mod: 76

## 2025-01-19 PROCEDURE — 82310 ASSAY OF CALCIUM: CPT | Performed by: INTERNAL MEDICINE

## 2025-01-19 PROCEDURE — 82607 VITAMIN B-12: CPT | Performed by: INTERNAL MEDICINE

## 2025-01-19 PROCEDURE — 36415 COLL VENOUS BLD VENIPUNCTURE: CPT | Performed by: INTERNAL MEDICINE

## 2025-01-19 PROCEDURE — 250N000011 HC RX IP 250 OP 636: Performed by: INTERNAL MEDICINE

## 2025-01-19 PROCEDURE — 99291 CRITICAL CARE FIRST HOUR: CPT | Performed by: INTERNAL MEDICINE

## 2025-01-19 PROCEDURE — 250N000012 HC RX MED GY IP 250 OP 636 PS 637: Performed by: INTERNAL MEDICINE

## 2025-01-19 PROCEDURE — 120N000004 HC R&B MS OVERFLOW

## 2025-01-19 PROCEDURE — 97530 THERAPEUTIC ACTIVITIES: CPT | Mod: GP

## 2025-01-19 PROCEDURE — 92610 EVALUATE SWALLOWING FUNCTION: CPT | Mod: GN | Performed by: SPEECH-LANGUAGE PATHOLOGIST

## 2025-01-19 PROCEDURE — 999N000157 HC STATISTIC RCP TIME EA 10 MIN

## 2025-01-19 PROCEDURE — 80048 BASIC METABOLIC PNL TOTAL CA: CPT | Performed by: STUDENT IN AN ORGANIZED HEALTH CARE EDUCATION/TRAINING PROGRAM

## 2025-01-19 PROCEDURE — 99418 PROLNG IP/OBS E/M EA 15 MIN: CPT | Performed by: INTERNAL MEDICINE

## 2025-01-19 RX ORDER — CARVEDILOL 25 MG/1
25 TABLET ORAL 2 TIMES DAILY WITH MEALS
Status: DISCONTINUED | OUTPATIENT
Start: 2025-01-19 | End: 2025-01-25

## 2025-01-19 RX ORDER — METHYLPREDNISOLONE SODIUM SUCCINATE 40 MG/ML
40 INJECTION INTRAMUSCULAR; INTRAVENOUS EVERY 24 HOURS
Status: COMPLETED | OUTPATIENT
Start: 2025-01-20 | End: 2025-01-22

## 2025-01-19 RX ORDER — NICOTINE POLACRILEX 4 MG
15-30 LOZENGE BUCCAL
Status: DISCONTINUED | OUTPATIENT
Start: 2025-01-19 | End: 2025-01-19

## 2025-01-19 RX ORDER — METHYLPREDNISOLONE SODIUM SUCCINATE 40 MG/ML
40 INJECTION INTRAMUSCULAR; INTRAVENOUS EVERY 24 HOURS
Status: DISCONTINUED | OUTPATIENT
Start: 2025-01-20 | End: 2025-01-19

## 2025-01-19 RX ORDER — DEXTROSE MONOHYDRATE 25 G/50ML
25-50 INJECTION, SOLUTION INTRAVENOUS
Status: DISCONTINUED | OUTPATIENT
Start: 2025-01-19 | End: 2025-01-19

## 2025-01-19 RX ORDER — CARVEDILOL 6.25 MG/1
6.25 TABLET ORAL 2 TIMES DAILY
Status: DISCONTINUED | OUTPATIENT
Start: 2025-01-19 | End: 2025-01-19

## 2025-01-19 RX ORDER — SODIUM CHLORIDE 9 MG/ML
INJECTION, SOLUTION INTRAVENOUS CONTINUOUS
Status: DISCONTINUED | OUTPATIENT
Start: 2025-01-19 | End: 2025-01-19

## 2025-01-19 RX ADMIN — IPRATROPIUM BROMIDE AND ALBUTEROL SULFATE 3 ML: .5; 3 SOLUTION RESPIRATORY (INHALATION) at 07:34

## 2025-01-19 RX ADMIN — AMIODARONE HYDROCHLORIDE 1 MG/MIN: 1.8 INJECTION, SOLUTION INTRAVENOUS at 21:41

## 2025-01-19 RX ADMIN — TACROLIMUS: 1 OINTMENT TOPICAL at 21:03

## 2025-01-19 RX ADMIN — METOPROLOL TARTRATE 5 MG: 5 INJECTION INTRAVENOUS at 14:44

## 2025-01-19 RX ADMIN — AMIODARONE HYDROCHLORIDE 150 MG: 1.5 INJECTION, SOLUTION INTRAVENOUS at 21:27

## 2025-01-19 RX ADMIN — METHYLPREDNISOLONE SODIUM SUCCINATE 40 MG: 40 INJECTION, POWDER, FOR SOLUTION INTRAMUSCULAR; INTRAVENOUS at 06:13

## 2025-01-19 RX ADMIN — IPRATROPIUM BROMIDE AND ALBUTEROL SULFATE 3 ML: .5; 3 SOLUTION RESPIRATORY (INHALATION) at 19:48

## 2025-01-19 RX ADMIN — METOPROLOL TARTRATE 5 MG: 5 INJECTION INTRAVENOUS at 02:27

## 2025-01-19 RX ADMIN — INSULIN GLARGINE 20 UNITS: 100 INJECTION, SOLUTION SUBCUTANEOUS at 13:36

## 2025-01-19 RX ADMIN — FENTANYL CITRATE 50 MCG: 50 INJECTION, SOLUTION INTRAMUSCULAR; INTRAVENOUS at 01:04

## 2025-01-19 RX ADMIN — BUDESONIDE 1 MG: 1 INHALANT ORAL at 19:48

## 2025-01-19 RX ADMIN — PANTOPRAZOLE SODIUM 40 MG: 40 INJECTION, POWDER, FOR SOLUTION INTRAVENOUS at 08:49

## 2025-01-19 RX ADMIN — BUDESONIDE 1 MG: 1 INHALANT ORAL at 07:29

## 2025-01-19 RX ADMIN — HEPARIN SODIUM 1250 UNITS/HR: 10000 INJECTION, SOLUTION INTRAVENOUS at 17:20

## 2025-01-19 RX ADMIN — TACROLIMUS: 1 OINTMENT TOPICAL at 08:49

## 2025-01-19 ASSESSMENT — ACTIVITIES OF DAILY LIVING (ADL)
ADLS_ACUITY_SCORE: 89
ADLS_ACUITY_SCORE: 89
ADLS_ACUITY_SCORE: 93
ADLS_ACUITY_SCORE: 89
ADLS_ACUITY_SCORE: 93
ADLS_ACUITY_SCORE: 89
ADLS_ACUITY_SCORE: 93
ADLS_ACUITY_SCORE: 89
ADLS_ACUITY_SCORE: 93
ADLS_ACUITY_SCORE: 93
ADLS_ACUITY_SCORE: 89

## 2025-01-19 NOTE — PROGRESS NOTES
Extubation Note    Successful completion of SBT (Yes or No):Yes   Extubation time:1820    Patient assessment:  Lung sounds:Course   Stridor Present (Yes or No): No  Patient tolerance: Fair     Oxygen device: Oxymask  Liter flow: 5L  SpO2: 100%    Plan:Continue to monitor

## 2025-01-19 NOTE — PROGRESS NOTES
"   01/19/25 1000   Appointment Info   Signing Clinician's Name / Credentials (PT) Niya Lopez, PT, DPT   Living Environment   People in Home child(dominick), adult   Current Living Arrangements house   Home Accessibility wheelchair accessible   Transportation Anticipated family or friend will provide   Self-Care   Usual Activity Tolerance fair   Current Activity Tolerance poor   Equipment Currently Used at Home wheelchair, manual   Fall history within last six months yes   Number of times patient has fallen within last six months 1   Activity/Exercise/Self-Care Comment Uses manual w/c for mobility, pivot tx to wheelchair independently at baseline and propels herself. Lives with daughter Caroline who helps with driving. ADL independence unclear-- pt doesn't answer all subjective questions well, just laughs when asked if she dresses herself. Per chart, had been assessed for motor chair in june but pt reports she didn't qualify. Does not use a prothestic leg   General Information   Onset of Illness/Injury or Date of Surgery 01/09/25   Referring Physician Cheo Watt MD   Patient/Family Therapy Goals Statement (PT) unable to state   Pertinent History of Current Problem (include personal factors and/or comorbidities that impact the POC) extubated 1/18 \"Supriya Herr is a 74 year old female with a history of ESRD on HD, CHF, COPD, DM type II, hypertension who was admitted 1/8 with influenza A. \" h/x L AKA   Existing Precautions/Restrictions fall   Weight-Bearing Status - LLE full weight-bearing   Weight-Bearing Status - RLE full weight-bearing   General Observations hoarse voice, can be challenging to understand   Cognition   Affect/Mental Status (Cognition) unable/difficult to assess;low arousal/lethargic   Follows Commands (Cognition) follows one-step commands;delayed response/completion   Cognitive Status Comments seems confused at times   Pain Assessment   Patient Currently in Pain No   Integumentary/Edema "   Integumentary/Edema Comments hardened skin R LE   Posture    Posture Forward head position   Range of Motion (ROM)   Range of Motion ROM deficits secondary to weakness   Strength (Manual Muscle Testing)   Strength (Manual Muscle Testing) Deficits observed during functional mobility   Strength Comments can lift R LE somewhat again gravity   Bed Mobility   Comment, (Bed Mobility) total assist with lift   Transfers   Comment, (Transfers) total assist with renetta lift   Gait/Stairs (Locomotion)   Comment, (Gait/Stairs) does not ambulate at baseline   Sensory Examination   Sensory Perception patient reports no sensory changes   Clinical Impression   Criteria for Skilled Therapeutic Intervention Yes, treatment indicated   PT Diagnosis (PT) impaired functional mobility   Influenced by the following impairments decreased strength, balance, and activity tolerance   Functional limitations due to impairments bed mobility, transfers, wheelchair propulsion   Clinical Presentation (PT Evaluation Complexity) evolving   Clinical Presentation Rationale clinical judgement   Clinical Decision Making (Complexity) moderate complexity   Planned Therapy Interventions (PT) balance training;bed mobility training;home exercise program;neuromuscular re-education;patient/family education;strengthening;transfer training;wheelchair management/propulsion training   Risk & Benefits of therapy have been explained evaluation/treatment results reviewed;care plan/treatment goals reviewed;risks/benefits reviewed;current/potential barriers reviewed;participants voiced agreement with care plan;participants included;patient   PT Total Evaluation Time   PT Eval, Moderate Complexity Minutes (77752) 11   Physical Therapy Goals   PT Frequency 5x/week   PT Predicted Duration/Target Date for Goal Attainment 01/26/25   PT Goals Bed Mobility;Transfers   PT: Bed Mobility Supervision/stand-by assist;Supine to/from sit;Rolling   PT: Transfers Minimal assist;Bed  to/from chair   Interventions   Interventions Quick Adds Therapeutic Activity   Therapeutic Activity   Therapeutic Activities: dynamic activities to improve functional performance Minutes (62798) 30   Symptoms Noted During/After Treatment Fatigue   Treatment Detail/Skilled Intervention Pt in supine, agreeable to get OOB. Can follow basic cuing, but does seem confused at times. Increased time for equipement set up, line/tube management, monitoring vitals. Used flat renetta sheet to renetta patient to nearly flat commbilizer. Pillow in front of knee and under foot for support. Set up in commbilizer chair, see VSFS- HR has been a bit high and fluctuating 100s-120s, but mostly 100s.   PT Discharge Planning   PT Plan trial bed mobility, dangling. working towards transfers as able   PT Discharge Recommendation (DC Rec) Transitional Care Facility   PT Rationale for DC Rec Patient presents to PT with significantly decreased functional mobility compared to baseline affected primarily by decreased activity tolerance, balance, and strength following 10 days of intubation. Will benefit from futher rehab to address this.   PT Brief overview of current status total assist with lift  (Goals of therapy will be to address safe mobility and make recs for d/c to next level of care. Pt and RN will continue to follow all falls risk precautions as documented by RN staff while hospitalized.)   PT Total Distance Amb During Session (feet) 0   Physical Therapy Time and Intention   Timed Code Treatment Minutes 30   Total Session Time (sum of timed and untimed services) 41

## 2025-01-19 NOTE — PROGRESS NOTES
Patient refused scheduled nebulizer couple minutes into treatment, removed mask.    Robert Velez, RRT

## 2025-01-19 NOTE — PROGRESS NOTES
Nephrology chart check    Status post dialysis yesterday, 3 L removed over 3.5 hours.  Subsequent extubation noted on 2 L nasal cannula this morning.    Can likely continue with this next week Tuesday Thursday Saturday dialysis schedule with reassessment tomorrow.

## 2025-01-19 NOTE — PROGRESS NOTES
SLP Bedside Swallow Evaluation  01/19/25 7905   Appointment Info   Signing Clinician's Name / Credentials (SLP) Ramila Graves MA [facility] SLP   General Information   Onset of Illness/Injury or Date of Surgery 01/08/25   Referring Physician Dr. King   Patient/Family Therapy Goal Statement (SLP) Did not state   Pertinent History of Current Problem Dx: Flu A, septic shock; intubated 1/9-1/18; Hx COPD, CHF, ESRD, DM2, L AKA   General Observations Pt was awake and sitting up in a chair.  Pt demonstrated overall fatigue and decreased voicing.   Type of Evaluation   Type of Evaluation Swallow Evaluation   Oral Motor   Oral Musculature   (Mild overall decreased ROM)   Dentition (Oral Motor)   Dentition (Oral Motor) dental appliance/dentures  (Upper dentures placed for pt)   Cough/Swallow/Gag Reflex (Oral Motor)   Comment, Cough/Swallow/Gag Reflex (Oral Motor) Weak cough on command   Vocal Quality/Secretion Management (Oral Motor)   Comment, Vocal Quality/Secretion Management (Oral Motor) Hoarse, breathgy, aphonic at times   General Swallowing Observations   Past History of Dysphagia None found   Respiratory Support nasal cannula   Current Diet/Method of Nutritional Intake (General Swallowing Observations, NIS) NPO   Swallowing Evaluation Clinical swallow evaluation   Clinical Swallow Evaluation   Feeding Assistance dependent   Clinical Swallow Evaluation Textures Trialed thin liquids   Clinical Swallow Eval: Thin Liquid Texture Trial   Mode of Presentation, Thin Liquids spoon   Volume of Liquid or Food Presented ice chips x 2   Oral Phase of Swallow premature pharyngeal entry   Pharyngeal Phase of Swallow reduction in laryngeal movement;repeated swallows  (delay)   Diagnostic Statement overt coughing on 2/2 trials   Swallowing Recommendations   Diet Consistency Recommendations NPO   Instrumental Assessment Recommendations   (to be determined)   Clinical Impression   Criteria for Skilled Therapeutic Interventions Met (SLP  Eval) Yes, treatment indicated   SLP Diagnosis Mod-severe oral-pharyngeal dysphagia s/p intubation   Risks & Benefits of therapy have been explained evaluation/treatment results reviewed;care plan/treatment goals reviewed;risks/benefits reviewed;current/potential barriers reviewed;patient;participants included;participants voiced agreement with care plan   Clinical Impression Comments Pt presents with mod-severe oral-pharyngeal dysphagia s/p intubation.  Findings include general fatigue, poor voicing, weak coughing, decreased elevation, delayed swallows, multiple swallows, and overt coughing after 2/2 ice chip trials.  Recommend NPO status with frequent oral cares.  Plan to provide Tx to continue swallow assessment and complete exercises as able.   SLP Total Evaluation Time   Eval: oral/pharyngeal swallow function, clinical swallow Minutes (57240) 10   Interventions   Interventions Quick Adds Swallowing Dysfunction   SLP Discharge Planning   SLP Plan PO trials, exercises. ? VFSS/FEES   SLP Discharge Recommendation Transitional Care Facility   SLP Rationale for DC Rec Swallow and voice function are well below baseline   SLP Brief overview of current status  Mod-severe oral-pharyngeal dysphagia s/p intubation; Rec: NPO status with frequent oral cares   SLP Time and Intention   Total Session Time (sum of timed and untimed services) 10
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
## 2025-01-19 NOTE — PLAN OF CARE
Goal Outcome Evaluation:      Plan of Care Reviewed With: patient    Overall Patient Progress: improvingOverall Patient Progress: improving    Outcome Evaluation: Patient is alert and is able to follow commands. Voice is hoarse following yesterday's extubation. Titrated from 5L to 2L on oxymask. Oral secretions have minimized throughout the night. Remains on insulin and heparin gtt.      Problem: Adult Inpatient Plan of Care  Goal: Plan of Care Review  Description: The Plan of Care Review/Shift note should be completed every shift.  The Outcome Evaluation is a brief statement about your assessment that the patient is improving, declining, or no change.  This information will be displayed automatically on your shift  note.  Outcome: Progressing  Flowsheets (Taken 1/19/2025 0513)  Outcome Evaluation: Patient is alert and is able to follow commands. Voice is hoarse following yesterday's extubation. Titrated from 5L to 2L on oxymask. Oral secretions have minimized throughout the night. Remains on insulin and heparin gtt.  Plan of Care Reviewed With: patient  Overall Patient Progress: improving     Problem: Restraint, Nonviolent  Goal: Absence of Harm or Injury  Outcome: Met  Intervention: Implement Least Restrictive Safety Strategies  Recent Flowsheet Documentation  Taken 1/19/2025 0400 by Carly Robb RN  Medical Device Protection:   IV pole/bag removed from visual field   tubing secured  Taken 1/19/2025 0000 by Carly Robb RN  Medical Device Protection:   IV pole/bag removed from visual field   tubing secured  Taken 1/18/2025 2000 by Carly Robb RN  Medical Device Protection:   IV pole/bag removed from visual field   tubing secured  Intervention: Protect Dignity, Rights and Personal Wellbeing  Recent Flowsheet Documentation  Taken 1/19/2025 0400 by Carly Robb RN  Trust Relationship/Rapport:   care explained   choices provided   reassurance provided  Taken 1/18/2025 2000 by Clarisse  Carly WONG RN  Trust Relationship/Rapport:   care explained   choices provided   reassurance provided   questions encouraged   questions answered  Intervention: Protect Skin and Joint Integrity  Recent Flowsheet Documentation  Taken 1/19/2025 0400 by Carly Robb RN  Body Position:   turned   lower extremity elevated   upper extremity elevated  Skin Protection:   adhesive use limited   skin to device areas padded   silicone foam dressing in place   pulse oximeter probe site changed   tubing/devices free from skin contact   skin sealant/moisture barrier applied  Range of Motion: active ROM (range of motion) encouraged  Taken 1/19/2025 0200 by Carly Robb RN  Body Position:   turned   lower extremity elevated   upper extremity elevated  Taken 1/19/2025 0000 by Carly Robb RN  Body Position:   turned   lower extremity elevated   upper extremity elevated  Skin Protection:   adhesive use limited   skin to device areas padded   silicone foam dressing in place   pulse oximeter probe site changed   tubing/devices free from skin contact   skin sealant/moisture barrier applied  Range of Motion: active ROM (range of motion) encouraged  Taken 1/18/2025 2200 by Carly Robb RN  Body Position:   turned   lower extremity elevated   upper extremity elevated  Taken 1/18/2025 2000 by Carly Robb RN  Body Position:   turned   lower extremity elevated   upper extremity elevated  Skin Protection:   adhesive use limited   skin to device areas padded   silicone foam dressing in place   pulse oximeter probe site changed   tubing/devices free from skin contact   skin sealant/moisture barrier applied  Range of Motion: active ROM (range of motion) encouraged     Problem: Mechanical Ventilation Invasive  Goal: Effective Communication  Outcome: Met  Intervention: Ensure Effective Communication  Recent Flowsheet Documentation  Taken 1/19/2025 0400 by Carly Robb RN  Trust Relationship/Rapport:    care explained   choices provided   reassurance provided  Taken 1/18/2025 2000 by Carly Robb RN  Trust Relationship/Rapport:   care explained   choices provided   reassurance provided   questions encouraged   questions answered  Goal: Mechanical Ventilation Liberation  Outcome: Met  Intervention: Promote Extubation and Mechanical Ventilation Liberation  Recent Flowsheet Documentation  Taken 1/19/2025 0400 by Carly Robb RN  Medication Review/Management:   high-risk medications identified   medications reviewed  Environmental Support: calm environment promoted  Taken 1/19/2025 0000 by Carly Robb RN  Medication Review/Management:   high-risk medications identified   medications reviewed  Environmental Support: calm environment promoted  Taken 1/18/2025 2000 by Carly Robb RN  Medication Review/Management:   high-risk medications identified   medications reviewed  Environmental Support: calm environment promoted  Goal: Optimal Nutrition Delivery  Outcome: Met  Intervention: Optimize Nutrition Delivery  Recent Flowsheet Documentation  Taken 1/19/2025 0400 by Carly Robb RN  Nutrition Support Management: weight trending reviewed  Taken 1/19/2025 0000 by Carly Robb RN  Nutrition Support Management: weight trending reviewed  Taken 1/18/2025 2000 by Carly Robb RN  Nutrition Support Management: weight trending reviewed  Goal: Absence of Device-Related Skin and Tissue Injury  Outcome: Met  Intervention: Maintain Skin and Tissue Health  Recent Flowsheet Documentation  Taken 1/19/2025 0400 by Carly Robb RN  Device Skin Pressure Protection:   absorbent pad utilized/changed   skin-to-device areas padded   tubing/devices free from skin contact   adhesive use limited  Taken 1/19/2025 0000 by Carly Robb RN  Device Skin Pressure Protection:   absorbent pad utilized/changed   skin-to-device areas padded   tubing/devices free from skin contact   adhesive use  limited  Taken 1/18/2025 2000 by Carly Robb RN  Device Skin Pressure Protection:   absorbent pad utilized/changed   skin-to-device areas padded   tubing/devices free from skin contact   adhesive use limited  Goal: Absence of Ventilator-Induced Lung Injury  Outcome: Met  Intervention: Prevent Ventilator-Associated Pneumonia  Recent Flowsheet Documentation  Taken 1/19/2025 0400 by Carly Robb, RN  Oral Care: swabbed with antiseptic solution  Head of Bed (HOB) Positioning: HOB at 30 degrees  Taken 1/19/2025 0200 by Carly Robb RN  Oral Care: suction provided  Head of Bed (HOB) Positioning: HOB at 30 degrees  Taken 1/19/2025 0000 by Carly Robb RN  Oral Care:   swabbed with antiseptic solution   suction provided   lip/mouth moisturizer applied  Head of Bed (HOB) Positioning: HOB at 30 degrees  Taken 1/18/2025 2200 by Carly Robb RN  Oral Care:   swabbed with antiseptic solution   suction provided   lip/mouth moisturizer applied  Head of Bed (HOB) Positioning: HOB at 30 degrees  Taken 1/18/2025 2000 by Carly Robb RN  Oral Care:   swabbed with antiseptic solution   suction provided   tongue brushed  Head of Bed (HOB) Positioning: HOB at 30 degrees

## 2025-01-19 NOTE — PROGRESS NOTES
CRITICAL CARE PROGRESS NOTE   [facility] INTENSIVE CARE  [address]  [place] [address]  Phone: [phone]    Patient:  Supriya Herr, Age 75 year old, Date of birth 1950, MRN# [id number]  Date of Visit:  01/19/2025  Referring Provider No ref. provider found      Assessment and plan :     Supriya Herr is a 74 year old female with a history of ESRD on HD, CHF, COPD, DM type II, hypertension who was admitted 1/8 with influenza A. This morning during dialysis, an RRT was called for hypoxia and she was intubated. The patient was admitted to the ICU for medical management. Extubated 1/18/25     Neurology/Psychiatry/Pain/Sedation:   1. H/o MDD/anxiety. On home zoloft, gabapentin    Cardiovascular/Hemodynamics/:   1. Acute respiratory failure, septic shock, JONE, ESRD on HD, afib in setting of influenza A PNA. Extubated 1/19/25. On 2L nasal cannula for SpO2>88%. On room air she was 88-89%. Off of vasopressors. iHD per nephrology. Completed 10d of tamiflu and 5d cefepime. Speech, OT and PT consult today  2. JONE. CPAP at night  3. Afib. On heparin infusion.  HR low 100's. Add coreg today  4. AECOPD. On bronchodilators and steroids. Cont methylpred 40mg daily for the next 5 days.  5. Dm2 and icu glucose protocol. On insulin infusion. Plan to transition to lantus and sliding scale today.     Disposition/Code Status/Other  1. Improving   2. Code: Full    ICU Prophylaxis:   1. DVT: Hep infusion/mechanical  2. VAP: HOB 30 degrees, chlorhexidine rinse  3. Stress Ulcer: PPI/H2 blocker  4. Restraints: na  5. IV Access - central access required and necessary for continued patient cares  6. Feeding - enteral    I am managing these acute and ongoing critical issues resulting in critical condition that impairs one or more vital organ systems, incur a high probability of imminent or life-threatening deterioration in the patient's condition and  "providing frequent personal assessment and manipulation of medications and life support equipment.     I have discussed the patient in detail and I agree with the findings, assessment, and plan as documented when this note was signed/cosigned on this day. The plan was formulated in conjunction with pharmacy, ICU nurses, and respiratory therapist. I have evaluated all laboratory values and imaging studies for the past 24 hours. I have reviewed all the consults that have been ordered and are active for this patient.      Critical Care Time: 30 min.  I spent this time (excluding procedures) personally providing and directing critical care services at the bedside and on the critical care unit.      Ravin King MD  Associate Professor of Medicine  Section of Interventional Pulmonology   Division of Pulmonary, Allergy, Critical Care and Sleep Medicine   [facility]  Pager: [phone]   Office: [phone]  Email: [email or web address]     Clinically Significant Risk Factors         # Hyponatremia: Lowest Na = 131 mmol/L in last 2 days, will monitor as appropriate  # Hypochloremia: Lowest Cl = 89 mmol/L in last 2 days, will monitor as appropriate      # Hypoalbuminemia: Lowest albumin = 2.8 g/dL at 1/13/2025  5:14 AM, will monitor as appropriate     # Hypertension: Noted on problem list           # DMII: A1C = 6.5 % (Ref range: <5.7 %) within past 6 months   # Obesity: Estimated body mass index is 34.29 kg/m  as calculated from the following:    Height as of this encounter: 1.702 m (5' 7\").    Weight as of this encounter: 99.3 kg (218 lb 14.7 oz).      # Financial/Environmental Concerns: none                  Overnight Events   No acute issues overnight       Lines/Tubes/Draines/Devices   .  Peripheral IV 01/09/25 Anterior;Right Lower forearm (Active)   Site Assessment WDL 01/19/25 0400   Line Status Infusing 01/19/25 0400   Dressing Transparent 01/19/25 0400   Dressing Status clean;dry;intact " 01/19/25 0400   Line Intervention Flushed 01/10/25 0800   Phlebitis Scale 0-->no symptoms 01/19/25 0400   Infiltration? no 01/19/25 0400   Number of days: 10       Peripheral IV 01/16/25 Anterior;Right Lower forearm (Active)   Site Assessment L 01/19/25 0400   Line Status Infusing 01/19/25 0400   Dressing Transparent 01/19/25 0400   Dressing Status clean;dry;intact 01/19/25 0400   Dressing Intervention New dressing  01/16/25 1356   Line Intervention Flushed 01/17/25 0800   Phlebitis Scale 0-->no symptoms 01/19/25 0400   Infiltration? no 01/19/25 0400   Number of days: 3       Hemodialysis Vascular Access Arteriovenous graft Left Forearm (Active)   Number of days: 1718       Hemodialysis Vascular Access AV fistula Superior Arm (Active)   Site Assessment [facility] 01/19/25 0400   Cannulation Needle Size 15 01/18/25 1353   Dressing Intervention New dressing 01/18/25 1748   Dressing Status Clean, dry, intact 01/19/25 0400   Hand Off Report Yes 01/18/25 1748   Number of days:        Wound Coccyx (Active)   Number of days: 1374       Wound Buttocks Pressure injury community acquired (Active)   Wound Bed Pink;Pale 01/19/25 0000   Drainage Amount None 01/19/25 0000   Wound Care/Cleansing Barrier applied  01/18/25 0200   Dressing Foam 01/19/25 0000   Dressing Status Clean, dry, intact 01/19/25 0000   Number of days: 9       Wound Breast (Active)   Wound Bed Moist;Pink 01/19/25 0000   Roxy-wound Assessment Warm 01/17/25 2000   Drainage Amount None 01/19/25 0000   Drainage Color/Characteristics Serosanguineous 01/12/25 0000   Wound Care/Cleansing Medication applied - see MAR 01/18/25 0800   Dressing Open to air 01/19/25 0000   Dressing Status Clean, dry, intact 01/17/25 0800   Number of days: 9       Rash 11/12/17 1700 Bilateral posterior thoracic spine macular (Active)   Number of days: 2625       Rash 01/10/25 0000 anterior groin (Active)   Distribution localized 01/11/25 0800   Color red 01/11/25 0800   Configuration/Shape  asymmetric 01/11/25 0800   Borders irregular 01/11/25 0800   Care, Rash cleansed with;soap and water;ointment/cream applied 01/10/25 0800   Number of days: 9       Rash 01/10/25 0000 Left lower breast (Active)   Distribution localized 01/10/25 1800   Color red 01/10/25 1800   Care, Rash cleansed with;ointment/cream applied;soap and water 01/10/25 0800   Number of days: 9       Rash 01/10/25 0000 Right lower breast (Active)   Distribution localized 01/10/25 1800   Color red 01/10/25 1800   Care, Rash cleansed with;soap and water;ointment/cream applied 01/10/25 0800   Number of days: 9       Wound (used by OP [facility] only) 01/17/24 1532 Left gluteal pressure injury (Active)   Number of days: 368        Medications:     Current Facility-Administered Medications   Medication Dose Route Frequency Provider Last Rate Last Admin    - MEDICATION INSTRUCTIONS for Dialysis Patients -   Does not apply See Admin Instructions Dusty Prieto MD        amiodarone (PACERONE) tablet 400 mg  400 mg Oral or FT or NG tube BID Cheo Watt MD   400 mg at 01/18/25 0958    Followed by    [START ON 1/20/2025] amiodarone (PACERONE) tablet 200 mg  200 mg Oral or FT or NG tube BID Cheo Watt MD        Followed by    [START ON 1/23/2025] amiodarone (PACERONE) tablet 200 mg  200 mg Oral or FT or NG tube Daily Cheo Watt MD        [Held by provider] amLODIPine (NORVASC) tablet 5 mg  5 mg Oral BID Marcell Grubbs PA-C   5 mg at 01/08/25 2217    atorvastatin (LIPITOR) tablet 20 mg  20 mg Oral or Feeding Tube QPM Kamryn Peguero MD   20 mg at 01/17/25 1930    B and C vitamin Complex with folic acid (NEPHRONEX) liquid 5 mL  5 mL Per Feeding Tube Daily Truman Moreland MD   5 mL at 01/18/25 0959    budesonide (PULMICORT) neb solution 1 mg  1 mg Nebulization BID Cheo Watt MD   1 mg at 01/19/25 0729    carvedilol (COREG) tablet 12.5 mg  12.5 mg Oral or Feeding Tube BID w/meals Ravin King MD   12.5 mg at 01/18/25 1233    [Held by  provider] carvedilol (COREG) tablet 25 mg  25 mg Oral BID w/meals Marcell Grubbs PA-C   25 mg at 01/08/25 1905    cetirizine (zyrTEC) tablet 5 mg  5 mg Oral or Feeding Tube At Bedtime Kamryn Peguero MD   5 mg at 01/17/25 2158    chlorhexidine (PERIDEX) 0.12 % solution 15 mL  15 mL Mouth/Throat Q12H Truman Moreland MD   15 mL at 01/18/25 0958    [Held by provider] furosemide (LASIX) tablet 40 mg  40 mg Oral Once per day on Tuesday Thursday Saturday Marcell Grubbs PA-C        And    [Held by provider] furosemide (LASIX) tablet 80 mg  80 mg Oral Once per day on Tuesday Thursday Saturday Marcell Grubbs PA-C        furosemide (LASIX) tablet 80 mg  80 mg Oral or NG Tube BID Cheo Watt MD   80 mg at 01/18/25 1623    gabapentin (NEURONTIN) solution 100 mg  100 mg Oral or Feeding Tube Daily Kamryn Peguero MD   100 mg at 01/18/25 1007    ipratropium - albuterol 0.5 mg/2.5 mg/3 mL (DUONEB) neb solution 3 mL  3 mL Nebulization 4x daily Marcell Grubbs PA-C   3 mL at 01/19/25 0734    methylPREDNISolone sodium succinate (SOLU-MEDROL) injection 40 mg  40 mg Intravenous Q12H Cheo Watt MD   40 mg at 01/19/25 0613    pantoprazole (PROTONIX) 2 mg/mL suspension 40 mg  40 mg Per Feeding Tube [facility] Truman Moreland MD   40 mg at 01/16/25 0821    Or    pantoprazole (PROTONIX) IV push injection 40 mg  40 mg Intravenous [facility] Truman Moreland MD   40 mg at 01/19/25 0849    sertraline (ZOLOFT) tablet 75 mg  75 mg Oral or Feeding Tube QPM Kamryn Peguero MD   75 mg at 01/17/25 1930    sevelamer carbonate (RENVELA) tablet 800 mg  800 mg Oral or Feeding Tube TID w/meals Kamryn Peguero MD   800 mg at 01/18/25 1756    sodium chloride (PF) 0.9% PF flush 3 mL  3 mL Intracatheter Q8H Marcell Grubbs PA-C   3 mL at 01/18/25 1945    tacrolimus (PROTOPIC) 0.1 % ointment   Topical BID Marcell Grubbs PA-C   Given at 01/19/25 0823    vitamin D3 (CHOLECALCIFEROL) tablet 50 mcg  50 mcg Oral or  Feeding Tube Daily Kamryn Peguero MD   50 mcg at 01/18/25 0958     Current Facility-Administered Medications   Medication Dose Route Frequency Provider Last Rate Last Admin    acetaminophen (TYLENOL) tablet 650 mg  650 mg Oral Q4H PRN Marcell Grubbs PA-C   650 mg at 01/16/25 2048    Or    acetaminophen (TYLENOL) Suppository 650 mg  650 mg Rectal Q4H PRN Marcell Grubbs PA-C        albuterol (PROVENTIL HFA/VENTOLIN HFA) inhaler  2 puff Inhalation Q6H PRN Marcell Grubbs PA-C   2 puff at 01/09/25 0021    benzonatate (TESSALON) capsule 100 mg  100 mg Oral TID PRN Marcell Grubbs PA-C   100 mg at 01/08/25 2358    calcium carbonate (TUMS) chewable tablet 1,000 mg  1,000 mg Oral 4x Daily PRN Marcell Grubbs PA-C        carboxymethylcellulose PF (REFRESH PLUS) 0.5 % ophthalmic solution 1 drop  1 drop Both Eyes Q1H PRN Ted Proctor APRN CNP        dextrose 10% infusion   Intravenous Continuous PRN Cheo Watt MD        dextrose 10% infusion   Intravenous Continuous PRN Truman Moreland MD        glucose gel 15-30 g  15-30 g Oral Q15 Min PRN Cheo Watt MD        Or    dextrose 50 % injection 25-50 mL  25-50 mL Intravenous Q15 Min PRN Cheo Watt MD        Or    glucagon injection 1 mg  1 mg Subcutaneous Q15 Min PRN Cheo Watt MD        fentaNYL (PF) (SUBLIMAZE) injection 25-50 mcg  25-50 mcg Intravenous Q1H PRN Cheo Watt MD   50 mcg at 01/19/25 0104    lidocaine (LMX4) cream   Topical Q1H PRN Marcell Grubbs PA-C        lidocaine 1 % 0.1-1 mL  0.1-1 mL Other Q1H PRN Marcell Grubbs PA-C        melatonin tablet 1 mg  1 mg Oral At Bedtime PRN Marcell Grubbs PA-C        metoprolol (LOPRESSOR) injection 2.5-5 mg  2.5-5 mg Intravenous Q6H PRN Erick Zeng MD   5 mg at 01/19/25 0227    miconazole (MICATIN) 2 % powder   Topical BID PRN Marcell Grubbs, PAAutumnC        naloxone (NARCAN) injection 0.2 mg  0.2 mg Intravenous Q2 Min PRN Cheo Watt MD        Or    naloxone (NARCAN) injection 0.4  mg  0.4 mg Intravenous Q2 Min PRN Cheo Watt MD        Or    naloxone (NARCAN) injection 0.2 mg  0.2 mg Intramuscular Q2 Min PRN Cheo Watt MD        Or    naloxone (NARCAN) injection 0.4 mg  0.4 mg Intramuscular Q2 Min PRN Cheo Watt MD        No lozenges or gum should be given while patient on BIPAP/AVAPS/AVAPS AE   Does not apply Continuous PRN Ted Proctor APRN CNP        ondansetron (ZOFRAN ODT) ODT tab 4 mg  4 mg Oral Q6H PRN Marcell Grubbs PA-C        Or    ondansetron (ZOFRAN) injection 4 mg  4 mg Intravenous Q6H PRN Marcell Grubbs PA-C        Patient may continue current oral medications   Does not apply Continuous PRN Ted Proctor APRN CNP        senna-docusate (SENOKOT-S/PERICOLACE) 8.6-50 MG per tablet 1 tablet  1 tablet Oral BID PRMarcell Badillo PA-C   1 tablet at 01/13/25 1412    Or    senna-docusate (SENOKOT-S/PERICOLACE) 8.6-50 MG per tablet 2 tablet  2 tablet Oral BID PRMarcell Badillo PA-C   2 tablet at 01/14/25 0750    sodium chloride (PF) 0.9% PF flush 3 mL  3 mL Intracatheter q1 min prMarcell Badillo PA-C             Review of Systems:   Unable to obtain due to critical illness          Physical Exam:   Temp:  [97.4  F (36.3  C)-100.2  F (37.9  C)] 98.9  F (37.2  C)  Pulse:  [] 100  Resp:  [10-35] 35  BP: ()/() 143/56  FiO2 (%):  [30 %] 30 %  SpO2:  [88 %-100 %] 98 %    Intake/Output Summary (Last 24 hours) at 1/19/2025 1034  Last data filed at 1/19/2025 0800  Gross per 24 hour   Intake 936.03 ml   Output 3000 ml   Net -2063.97 ml     Wt Readings from Last 4 Encounters:   01/19/25 99.3 kg (218 lb 14.7 oz)   06/14/24 101.1 kg (222 lb 14.2 oz)   06/03/24 101.1 kg (222 lb 14.2 oz)   04/24/24 101.1 kg (222 lb 14.2 oz)     BP - Mean:  [] 110  FiO2 (%): 30 %, Resp: (!) 35, Vent Mode: CMV/AC, Resp Rate (Set): 12 breaths/min, Tidal Volume (Set, mL): 430 mL, PEEP (cm H2O): 8 cmH2O, Pressure Support (cm H2O): 10 cmH2O, Resp Rate (Set): 12 breaths/min,  Tidal Volume (Set, mL): 430 mL, PEEP (cm H2O): 8 cmH2O  Recent Labs   Lab 01/17/25  1213 01/16/25  0901   O2PER 30 30       GEN: no acute distress   HEENT: head ncat, sclera anicteric, OP patent, trachea midline   PULM: unlabored synchronous with vent, clear anteriorly    CV/COR: RRR S1S2 no gallop,  No rub, no murmur  ABD: soft nontender, hypoactive bowel sounds, no mass  EXT:  Edema   warm  NEURO: grossly intact  SKIN: no obvious rash      Data:   All data and imaging reviewed

## 2025-01-20 ENCOUNTER — APPOINTMENT (OUTPATIENT)
Dept: GENERAL RADIOLOGY | Facility: CLINIC | Age: 75
DRG: 207 | End: 2025-01-20
Attending: INTERNAL MEDICINE
Payer: COMMERCIAL

## 2025-01-20 ENCOUNTER — ANESTHESIA EVENT (OUTPATIENT)
Dept: INTENSIVE CARE | Facility: CLINIC | Age: 75
End: 2025-01-20
Payer: COMMERCIAL

## 2025-01-20 ENCOUNTER — APPOINTMENT (OUTPATIENT)
Dept: GENERAL RADIOLOGY | Facility: CLINIC | Age: 75
DRG: 207 | End: 2025-01-20
Attending: HOSPITALIST
Payer: COMMERCIAL

## 2025-01-20 ENCOUNTER — ANESTHESIA (OUTPATIENT)
Dept: INTENSIVE CARE | Facility: CLINIC | Age: 75
End: 2025-01-20
Payer: COMMERCIAL

## 2025-01-20 ENCOUNTER — APPOINTMENT (OUTPATIENT)
Dept: CT IMAGING | Facility: CLINIC | Age: 75
DRG: 207 | End: 2025-01-20
Attending: PHYSICIAN ASSISTANT
Payer: COMMERCIAL

## 2025-01-20 ENCOUNTER — APPOINTMENT (OUTPATIENT)
Dept: ULTRASOUND IMAGING | Facility: CLINIC | Age: 75
DRG: 207 | End: 2025-01-20
Attending: INTERNAL MEDICINE
Payer: COMMERCIAL

## 2025-01-20 ENCOUNTER — APPOINTMENT (OUTPATIENT)
Dept: GENERAL RADIOLOGY | Facility: CLINIC | Age: 75
DRG: 207 | End: 2025-01-20
Attending: PHYSICIAN ASSISTANT
Payer: COMMERCIAL

## 2025-01-20 PROBLEM — G93.41 ACUTE METABOLIC ENCEPHALOPATHY: Status: ACTIVE | Noted: 2025-01-20

## 2025-01-20 PROBLEM — R65.20 SEVERE SEPSIS WITH ACUTE ORGAN DYSFUNCTION (H): Status: ACTIVE | Noted: 2025-01-20

## 2025-01-20 PROBLEM — N18.6 ESRD ON DIALYSIS (H): Status: ACTIVE | Noted: 2020-10-09

## 2025-01-20 PROBLEM — R65.20 SEVERE SEPSIS WITH ACUTE ORGAN DYSFUNCTION (H): Status: ACTIVE | Noted: 2025-01-08

## 2025-01-20 PROBLEM — J90 PLEURAL EFFUSION: Status: ACTIVE | Noted: 2025-01-20

## 2025-01-20 PROBLEM — A41.9 SEVERE SEPSIS WITH ACUTE ORGAN DYSFUNCTION (H): Status: ACTIVE | Noted: 2025-01-20

## 2025-01-20 PROBLEM — Z99.2 ESRD ON DIALYSIS (H): Status: ACTIVE | Noted: 2020-10-09

## 2025-01-20 PROBLEM — A41.9 SEVERE SEPSIS WITH ACUTE ORGAN DYSFUNCTION (H): Status: ACTIVE | Noted: 2025-01-08

## 2025-01-20 PROBLEM — G93.41 ACUTE METABOLIC ENCEPHALOPATHY: Status: ACTIVE | Noted: 2025-01-09

## 2025-01-20 LAB
ANION GAP SERPL CALCULATED.3IONS-SCNC: 19 MMOL/L (ref 7–15)
ATRIAL RATE - MUSE: 141 BPM
BASE EXCESS BLDV CALC-SCNC: -1.6 MMOL/L (ref -3–3)
BASE EXCESS BLDV CALC-SCNC: -3.5 MMOL/L (ref -3–3)
BUN SERPL-MCNC: 77 MG/DL (ref 8–23)
CALCIUM SERPL-MCNC: 10.7 MG/DL (ref 8.8–10.4)
CHLORIDE SERPL-SCNC: 92 MMOL/L (ref 98–107)
CREAT SERPL-MCNC: 4.27 MG/DL (ref 0.51–0.95)
DIASTOLIC BLOOD PRESSURE - MUSE: NORMAL MMHG
EGFRCR SERPLBLD CKD-EPI 2021: 10 ML/MIN/1.73M2
ERYTHROCYTE [DISTWIDTH] IN BLOOD BY AUTOMATED COUNT: 17.2 % (ref 10–15)
ERYTHROCYTE [DISTWIDTH] IN BLOOD BY AUTOMATED COUNT: 17.2 % (ref 10–15)
GLUCOSE BLDC GLUCOMTR-MCNC: 144 MG/DL (ref 70–99)
GLUCOSE BLDC GLUCOMTR-MCNC: 162 MG/DL (ref 70–99)
GLUCOSE BLDC GLUCOMTR-MCNC: 177 MG/DL (ref 70–99)
GLUCOSE BLDC GLUCOMTR-MCNC: 186 MG/DL (ref 70–99)
GLUCOSE BLDC GLUCOMTR-MCNC: 240 MG/DL (ref 70–99)
GLUCOSE BLDC GLUCOMTR-MCNC: 248 MG/DL (ref 70–99)
GLUCOSE BODY FLUID SOURCE: NORMAL
GLUCOSE FLD-MCNC: 193 MG/DL
GLUCOSE SERPL-MCNC: 184 MG/DL (ref 70–99)
HCO3 BLDV-SCNC: 23 MMOL/L (ref 21–28)
HCO3 BLDV-SCNC: 27 MMOL/L (ref 21–28)
HCO3 SERPL-SCNC: 23 MMOL/L (ref 22–29)
HCT VFR BLD AUTO: 31.9 % (ref 35–47)
HCT VFR BLD AUTO: 33.3 % (ref 35–47)
HGB BLD-MCNC: 10.2 G/DL (ref 11.7–15.7)
HGB BLD-MCNC: 10.7 G/DL (ref 11.7–15.7)
INTERPRETATION ECG - MUSE: NORMAL
KOH PREPARATION: NORMAL
KOH PREPARATION: NORMAL
LACTATE SERPL-SCNC: 0.9 MMOL/L (ref 0.7–2)
LD BODY BODY FLUID SOURCE: NORMAL
LDH FLD L TO P-CCNC: 69 U/L
LDH SERPL L TO P-CCNC: 188 U/L (ref 0–250)
MAGNESIUM SERPL-MCNC: 2.5 MG/DL (ref 1.7–2.3)
MCH RBC QN AUTO: 31.4 PG (ref 26.5–33)
MCH RBC QN AUTO: 31.8 PG (ref 26.5–33)
MCHC RBC AUTO-ENTMCNC: 32 G/DL (ref 31.5–36.5)
MCHC RBC AUTO-ENTMCNC: 32.1 G/DL (ref 31.5–36.5)
MCV RBC AUTO: 98 FL (ref 78–100)
MCV RBC AUTO: 99 FL (ref 78–100)
NT-PROBNP SERPL-MCNC: ABNORMAL PG/ML (ref 0–1800)
O2/TOTAL GAS SETTING VFR VENT: 5 %
O2/TOTAL GAS SETTING VFR VENT: 50 %
OXYHGB MFR BLDV: 79 % (ref 70–75)
OXYHGB MFR BLDV: 81 % (ref 70–75)
P AXIS - MUSE: NORMAL DEGREES
PCO2 BLDV: 45 MM HG (ref 40–50)
PCO2 BLDV: 62 MM HG (ref 40–50)
PH BLDV: 7.24 [PH] (ref 7.32–7.43)
PH BLDV: 7.31 [PH] (ref 7.32–7.43)
PHOSPHATE SERPL-MCNC: 7.8 MG/DL (ref 2.5–4.5)
PLATELET # BLD AUTO: 325 10E3/UL (ref 150–450)
PLATELET # BLD AUTO: 350 10E3/UL (ref 150–450)
PO2 BLDV: 54 MM HG (ref 25–47)
PO2 BLDV: 57 MM HG (ref 25–47)
POTASSIUM SERPL-SCNC: 5 MMOL/L (ref 3.4–5.3)
PR INTERVAL - MUSE: NORMAL MS
PROT FLD-MCNC: 1.6 G/DL
PROT SERPL-MCNC: 6.3 G/DL (ref 6.4–8.3)
PROTEIN BODY FLUID SOURCE: NORMAL
QRS DURATION - MUSE: 92 MS
QT - MUSE: 332 MS
QTC - MUSE: 480 MS
R AXIS - MUSE: 100 DEGREES
RBC # BLD AUTO: 3.21 10E6/UL (ref 3.8–5.2)
RBC # BLD AUTO: 3.41 10E6/UL (ref 3.8–5.2)
SAO2 % BLDV: 80.4 % (ref 70–75)
SAO2 % BLDV: 82.3 % (ref 70–75)
SODIUM SERPL-SCNC: 134 MMOL/L (ref 135–145)
SYSTOLIC BLOOD PRESSURE - MUSE: NORMAL MMHG
T AXIS - MUSE: -53 DEGREES
TROPONIN T SERPL HS-MCNC: 65 NG/L
TROPONIN T SERPL HS-MCNC: 72 NG/L
UFH PPP CHRO-ACNC: 0.28 IU/ML
VENTRICULAR RATE- MUSE: 126 BPM
WBC # BLD AUTO: 14.1 10E3/UL (ref 4–11)
WBC # BLD AUTO: 20.7 10E3/UL (ref 4–11)

## 2025-01-20 PROCEDURE — 250N000011 HC RX IP 250 OP 636: Performed by: INTERNAL MEDICINE

## 2025-01-20 PROCEDURE — 370N000003 HC ANESTHESIA WARD SERVICE: Performed by: NURSE ANESTHETIST, CERTIFIED REGISTERED

## 2025-01-20 PROCEDURE — 82945 GLUCOSE OTHER FLUID: CPT | Performed by: INTERNAL MEDICINE

## 2025-01-20 PROCEDURE — 71250 CT THORAX DX C-: CPT

## 2025-01-20 PROCEDURE — 87070 CULTURE OTHR SPECIMN AEROBIC: CPT | Performed by: INTERNAL MEDICINE

## 2025-01-20 PROCEDURE — 120N000004 HC R&B MS OVERFLOW

## 2025-01-20 PROCEDURE — 250N000011 HC RX IP 250 OP 636: Performed by: NURSE ANESTHETIST, CERTIFIED REGISTERED

## 2025-01-20 PROCEDURE — 99207 PR NO BILLABLE SERVICE THIS VISIT: CPT | Performed by: INTERNAL MEDICINE

## 2025-01-20 PROCEDURE — 83615 LACTATE (LD) (LDH) ENZYME: CPT | Performed by: INTERNAL MEDICINE

## 2025-01-20 PROCEDURE — 99292 CRITICAL CARE ADDL 30 MIN: CPT | Mod: 25 | Performed by: INTERNAL MEDICINE

## 2025-01-20 PROCEDURE — 31624 DX BRONCHOSCOPE/LAVAGE: CPT | Performed by: INTERNAL MEDICINE

## 2025-01-20 PROCEDURE — 5A1955Z RESPIRATORY VENTILATION, GREATER THAN 96 CONSECUTIVE HOURS: ICD-10-PCS | Performed by: INTERNAL MEDICINE

## 2025-01-20 PROCEDURE — 99291 CRITICAL CARE FIRST HOUR: CPT | Mod: 25 | Performed by: INTERNAL MEDICINE

## 2025-01-20 PROCEDURE — 90935 HEMODIALYSIS ONE EVALUATION: CPT

## 2025-01-20 PROCEDURE — 84100 ASSAY OF PHOSPHORUS: CPT | Performed by: STUDENT IN AN ORGANIZED HEALTH CARE EDUCATION/TRAINING PROGRAM

## 2025-01-20 PROCEDURE — 250N000011 HC RX IP 250 OP 636

## 2025-01-20 PROCEDURE — 250N000012 HC RX MED GY IP 250 OP 636 PS 637: Performed by: INTERNAL MEDICINE

## 2025-01-20 PROCEDURE — 84155 ASSAY OF PROTEIN SERUM: CPT | Performed by: INTERNAL MEDICINE

## 2025-01-20 PROCEDURE — 87081 CULTURE SCREEN ONLY: CPT | Performed by: INTERNAL MEDICINE

## 2025-01-20 PROCEDURE — 80048 BASIC METABOLIC PNL TOTAL CA: CPT | Performed by: STUDENT IN AN ORGANIZED HEALTH CARE EDUCATION/TRAINING PROGRAM

## 2025-01-20 PROCEDURE — 250N000009 HC RX 250: Performed by: INTERNAL MEDICINE

## 2025-01-20 PROCEDURE — 87305 ASPERGILLUS AG IA: CPT | Performed by: INTERNAL MEDICINE

## 2025-01-20 PROCEDURE — 250N000013 HC RX MED GY IP 250 OP 250 PS 637: Performed by: STUDENT IN AN ORGANIZED HEALTH CARE EDUCATION/TRAINING PROGRAM

## 2025-01-20 PROCEDURE — 84157 ASSAY OF PROTEIN OTHER: CPT | Performed by: INTERNAL MEDICINE

## 2025-01-20 PROCEDURE — 36415 COLL VENOUS BLD VENIPUNCTURE: CPT | Performed by: INTERNAL MEDICINE

## 2025-01-20 PROCEDURE — 89051 BODY FLUID CELL COUNT: CPT | Performed by: INTERNAL MEDICINE

## 2025-01-20 PROCEDURE — 84484 ASSAY OF TROPONIN QUANT: CPT | Performed by: PHYSICIAN ASSISTANT

## 2025-01-20 PROCEDURE — 999N000065 XR ABDOMEN PORT 1 VIEW

## 2025-01-20 PROCEDURE — 87206 SMEAR FLUORESCENT/ACID STAI: CPT | Performed by: INTERNAL MEDICINE

## 2025-01-20 PROCEDURE — 999N000065 XR CHEST PORT 1 VIEW

## 2025-01-20 PROCEDURE — 93010 ELECTROCARDIOGRAM REPORT: CPT | Performed by: INTERNAL MEDICINE

## 2025-01-20 PROCEDURE — 82310 ASSAY OF CALCIUM: CPT | Performed by: STUDENT IN AN ORGANIZED HEALTH CARE EDUCATION/TRAINING PROGRAM

## 2025-01-20 PROCEDURE — 0W993ZX DRAINAGE OF RIGHT PLEURAL CAVITY, PERCUTANEOUS APPROACH, DIAGNOSTIC: ICD-10-PCS | Performed by: INTERNAL MEDICINE

## 2025-01-20 PROCEDURE — 250N000009 HC RX 250

## 2025-01-20 PROCEDURE — 85027 COMPLETE CBC AUTOMATED: CPT | Performed by: STUDENT IN AN ORGANIZED HEALTH CARE EDUCATION/TRAINING PROGRAM

## 2025-01-20 PROCEDURE — 36415 COLL VENOUS BLD VENIPUNCTURE: CPT | Performed by: PHYSICIAN ASSISTANT

## 2025-01-20 PROCEDURE — 83880 ASSAY OF NATRIURETIC PEPTIDE: CPT | Performed by: PHYSICIAN ASSISTANT

## 2025-01-20 PROCEDURE — 32555 ASPIRATE PLEURA W/ IMAGING: CPT

## 2025-01-20 PROCEDURE — 87385 HISTOPLASMA CAPSUL AG IA: CPT | Performed by: INTERNAL MEDICINE

## 2025-01-20 PROCEDURE — 250N000009 HC RX 250: Performed by: RADIOLOGY

## 2025-01-20 PROCEDURE — 87102 FUNGUS ISOLATION CULTURE: CPT | Performed by: INTERNAL MEDICINE

## 2025-01-20 PROCEDURE — 70450 CT HEAD/BRAIN W/O DYE: CPT

## 2025-01-20 PROCEDURE — 250N000009 HC RX 250: Performed by: STUDENT IN AN ORGANIZED HEALTH CARE EDUCATION/TRAINING PROGRAM

## 2025-01-20 PROCEDURE — 88108 CYTOPATH CONCENTRATE TECH: CPT | Mod: TC | Performed by: INTERNAL MEDICINE

## 2025-01-20 PROCEDURE — 89050 BODY FLUID CELL COUNT: CPT | Performed by: INTERNAL MEDICINE

## 2025-01-20 PROCEDURE — 85014 HEMATOCRIT: CPT | Performed by: INTERNAL MEDICINE

## 2025-01-20 PROCEDURE — 87210 SMEAR WET MOUNT SALINE/INK: CPT | Performed by: INTERNAL MEDICINE

## 2025-01-20 PROCEDURE — 83735 ASSAY OF MAGNESIUM: CPT | Performed by: STUDENT IN AN ORGANIZED HEALTH CARE EDUCATION/TRAINING PROGRAM

## 2025-01-20 PROCEDURE — 0B9F8ZZ DRAINAGE OF RIGHT LOWER LUNG LOBE, VIA NATURAL OR ARTIFICIAL OPENING ENDOSCOPIC: ICD-10-PCS | Performed by: INTERNAL MEDICINE

## 2025-01-20 PROCEDURE — 250N000009 HC RX 250: Performed by: NURSE ANESTHETIST, CERTIFIED REGISTERED

## 2025-01-20 PROCEDURE — 86356 MONONUCLEAR CELL ANTIGEN: CPT | Performed by: INTERNAL MEDICINE

## 2025-01-20 PROCEDURE — 999N000197 HC STATISTIC WOC PT EDUCATION, 0-15 MIN

## 2025-01-20 PROCEDURE — 93005 ELECTROCARDIOGRAM TRACING: CPT

## 2025-01-20 PROCEDURE — 99291 CRITICAL CARE FIRST HOUR: CPT | Performed by: PHYSICIAN ASSISTANT

## 2025-01-20 PROCEDURE — 99232 SBSQ HOSP IP/OBS MODERATE 35: CPT | Performed by: INTERNAL MEDICINE

## 2025-01-20 PROCEDURE — 85520 HEPARIN ASSAY: CPT | Performed by: INTERNAL MEDICINE

## 2025-01-20 PROCEDURE — 71045 X-RAY EXAM CHEST 1 VIEW: CPT

## 2025-01-20 PROCEDURE — 87798 DETECT AGENT NOS DNA AMP: CPT | Performed by: INTERNAL MEDICINE

## 2025-01-20 PROCEDURE — 258N000003 HC RX IP 258 OP 636: Performed by: INTERNAL MEDICINE

## 2025-01-20 PROCEDURE — 250N000013 HC RX MED GY IP 250 OP 250 PS 637: Performed by: INTERNAL MEDICINE

## 2025-01-20 PROCEDURE — 94640 AIRWAY INHALATION TREATMENT: CPT | Mod: 76

## 2025-01-20 PROCEDURE — 87076 CULTURE ANAEROBE IDENT EACH: CPT | Performed by: INTERNAL MEDICINE

## 2025-01-20 PROCEDURE — 999N000289 HC STATISTIC BRONCHOSCOPY ASST

## 2025-01-20 PROCEDURE — 200N000001 HC R&B ICU

## 2025-01-20 PROCEDURE — 82805 BLOOD GASES W/O2 SATURATION: CPT | Performed by: PHYSICIAN ASSISTANT

## 2025-01-20 PROCEDURE — 83605 ASSAY OF LACTIC ACID: CPT | Performed by: PHYSICIAN ASSISTANT

## 2025-01-20 PROCEDURE — 999N000157 HC STATISTIC RCP TIME EA 10 MIN

## 2025-01-20 RX ORDER — DEXTROSE MONOHYDRATE 25 G/50ML
25-50 INJECTION, SOLUTION INTRAVENOUS
Status: DISCONTINUED | OUTPATIENT
Start: 2025-01-20 | End: 2025-01-28

## 2025-01-20 RX ORDER — PROPOFOL 10 MG/ML
INJECTION, EMULSION INTRAVENOUS PRN
Status: DISCONTINUED | OUTPATIENT
Start: 2025-01-20 | End: 2025-01-20

## 2025-01-20 RX ORDER — NICOTINE POLACRILEX 4 MG
15-30 LOZENGE BUCCAL
Status: DISCONTINUED | OUTPATIENT
Start: 2025-01-20 | End: 2025-01-28

## 2025-01-20 RX ORDER — DEXMEDETOMIDINE HYDROCHLORIDE 4 UG/ML
.1-1.2 INJECTION, SOLUTION INTRAVENOUS CONTINUOUS
Status: DISCONTINUED | OUTPATIENT
Start: 2025-01-20 | End: 2025-01-29

## 2025-01-20 RX ORDER — LIDOCAINE HYDROCHLORIDE 10 MG/ML
10 INJECTION, SOLUTION EPIDURAL; INFILTRATION; INTRACAUDAL; PERINEURAL ONCE
Status: COMPLETED | OUTPATIENT
Start: 2025-01-20 | End: 2025-01-20

## 2025-01-20 RX ORDER — PROPOFOL 10 MG/ML
5-75 INJECTION, EMULSION INTRAVENOUS CONTINUOUS
Status: DISCONTINUED | OUTPATIENT
Start: 2025-01-20 | End: 2025-01-20

## 2025-01-20 RX ORDER — PROPOFOL 10 MG/ML
INJECTION, EMULSION INTRAVENOUS
Status: COMPLETED
Start: 2025-01-20 | End: 2025-01-20

## 2025-01-20 RX ORDER — NOREPINEPHRINE BITARTRATE 0.02 MG/ML
.01-.6 INJECTION, SOLUTION INTRAVENOUS CONTINUOUS
Status: DISCONTINUED | OUTPATIENT
Start: 2025-01-20 | End: 2025-01-24

## 2025-01-20 RX ORDER — NOREPINEPHRINE BITARTRATE 0.02 MG/ML
INJECTION, SOLUTION INTRAVENOUS
Status: COMPLETED
Start: 2025-01-20 | End: 2025-01-20

## 2025-01-20 RX ORDER — HEPARIN SODIUM 10000 [USP'U]/100ML
0-5000 INJECTION, SOLUTION INTRAVENOUS CONTINUOUS
Status: DISCONTINUED | OUTPATIENT
Start: 2025-01-20 | End: 2025-01-27

## 2025-01-20 RX ORDER — METOPROLOL TARTRATE 1 MG/ML
5 INJECTION, SOLUTION INTRAVENOUS ONCE
Status: COMPLETED | OUTPATIENT
Start: 2025-01-20 | End: 2025-01-20

## 2025-01-20 RX ADMIN — AMIODARONE HYDROCHLORIDE 0.5 MG/MIN: 1.8 INJECTION, SOLUTION INTRAVENOUS at 03:26

## 2025-01-20 RX ADMIN — SERTRALINE HYDROCHLORIDE 75 MG: 50 TABLET ORAL at 20:52

## 2025-01-20 RX ADMIN — NOREPINEPHRINE BITARTRATE 0.03 MCG/KG/MIN: 0.02 INJECTION, SOLUTION INTRAVENOUS at 14:24

## 2025-01-20 RX ADMIN — SEVELAMER CARBONATE 800 MG: 800 TABLET, FILM COATED ORAL at 17:05

## 2025-01-20 RX ADMIN — METOPROLOL TARTRATE 5 MG: 5 INJECTION INTRAVENOUS at 06:41

## 2025-01-20 RX ADMIN — PROPOFOL 20 MCG/KG/MIN: 10 INJECTION, EMULSION INTRAVENOUS at 12:24

## 2025-01-20 RX ADMIN — IPRATROPIUM BROMIDE AND ALBUTEROL SULFATE 3 ML: .5; 3 SOLUTION RESPIRATORY (INHALATION) at 15:44

## 2025-01-20 RX ADMIN — PROPOFOL 40 MG: 10 INJECTION, EMULSION INTRAVENOUS at 12:16

## 2025-01-20 RX ADMIN — SODIUM CHLORIDE 200 ML: 9 INJECTION, SOLUTION INTRAVENOUS at 18:09

## 2025-01-20 RX ADMIN — Medication 5 ML: at 15:52

## 2025-01-20 RX ADMIN — TACROLIMUS: 1 OINTMENT TOPICAL at 20:53

## 2025-01-20 RX ADMIN — SODIUM CHLORIDE 250 ML: 9 INJECTION, SOLUTION INTRAVENOUS at 18:09

## 2025-01-20 RX ADMIN — ATORVASTATIN CALCIUM 20 MG: 10 TABLET, FILM COATED ORAL at 20:52

## 2025-01-20 RX ADMIN — IPRATROPIUM BROMIDE AND ALBUTEROL SULFATE 3 ML: .5; 3 SOLUTION RESPIRATORY (INHALATION) at 19:47

## 2025-01-20 RX ADMIN — METOPROLOL TARTRATE 5 MG: 5 INJECTION INTRAVENOUS at 11:23

## 2025-01-20 RX ADMIN — INSULIN ASPART 2 UNITS: 100 INJECTION, SOLUTION INTRAVENOUS; SUBCUTANEOUS at 23:43

## 2025-01-20 RX ADMIN — INSULIN ASPART 1 UNITS: 100 INJECTION, SOLUTION INTRAVENOUS; SUBCUTANEOUS at 05:06

## 2025-01-20 RX ADMIN — METOPROLOL TARTRATE 5 MG: 5 INJECTION INTRAVENOUS at 04:55

## 2025-01-20 RX ADMIN — DEXMEDETOMIDINE HYDROCHLORIDE 0.4 MCG/KG/HR: 400 INJECTION INTRAVENOUS at 23:10

## 2025-01-20 RX ADMIN — AMIODARONE HYDROCHLORIDE 0.5 MG/MIN: 1.8 INJECTION, SOLUTION INTRAVENOUS at 14:27

## 2025-01-20 RX ADMIN — Medication 10 MG: at 15:01

## 2025-01-20 RX ADMIN — DEXMEDETOMIDINE HYDROCHLORIDE 0.2 MCG/KG/HR: 400 INJECTION INTRAVENOUS at 14:20

## 2025-01-20 RX ADMIN — BUDESONIDE 1 MG: 1 INHALANT ORAL at 19:47

## 2025-01-20 RX ADMIN — LIDOCAINE HYDROCHLORIDE ANHYDROUS 10 ML: 10 INJECTION, SOLUTION INFILTRATION at 11:42

## 2025-01-20 RX ADMIN — ROCURONIUM BROMIDE 50 MG: 50 INJECTION, SOLUTION INTRAVENOUS at 12:16

## 2025-01-20 RX ADMIN — INSULIN ASPART 5 UNITS: 100 INJECTION, SOLUTION INTRAVENOUS; SUBCUTANEOUS at 15:52

## 2025-01-20 RX ADMIN — INSULIN ASPART 1 UNITS: 100 INJECTION, SOLUTION INTRAVENOUS; SUBCUTANEOUS at 20:55

## 2025-01-20 RX ADMIN — METHYLPREDNISOLONE SODIUM SUCCINATE 40 MG: 40 INJECTION, POWDER, FOR SOLUTION INTRAMUSCULAR; INTRAVENOUS at 06:21

## 2025-01-20 RX ADMIN — CETIRIZINE HYDROCHLORIDE 5 MG: 5 TABLET ORAL at 21:10

## 2025-01-20 RX ADMIN — INSULIN ASPART 5 UNITS: 100 INJECTION, SOLUTION INTRAVENOUS; SUBCUTANEOUS at 12:49

## 2025-01-20 RX ADMIN — HEPARIN SODIUM 1250 UNITS/HR: 10000 INJECTION, SOLUTION INTRAVENOUS at 23:12

## 2025-01-20 ASSESSMENT — ACTIVITIES OF DAILY LIVING (ADL)
ADLS_ACUITY_SCORE: 93

## 2025-01-20 NOTE — PROGRESS NOTES
[facility] ESTABLISHED PATIENT CARDIOLOGY PROGRESS NOTE    Patient Name: Supriya Herr  Age:75 year old   Sex: female   Admission Date: 1/8/2025      SUBJECTIVE:   Reintubated today for respiratory distress/encephalopathy.  Remains in atrial fibrillation with heart rate 100-120 bpm.  Currently on IV amiodarone and carvedilol 25 mg twice daily, anticoagulated with IV heparin.      PERTINENT INTERVAL EVENTS:   1/8/2025: Admitted with influenza A pneumonia with respiratory failure; intubated.  1/15/2025: Developed atrial fibrillation RVR.  1/18/2025: Extubated.  1/20/2025: Reintubated.      CURRENT MEDICATIONS:     Current Facility-Administered Medications   Medication Dose Route Frequency Provider Last Rate Last Admin    - MEDICATION INSTRUCTIONS for Dialysis Patients -   Does not apply See Admin Instructions Dusty Prieto MD        acetaminophen (TYLENOL) tablet 650 mg  650 mg Oral Q4H PRN Marcell Grubbs PA-C   650 mg at 01/16/25 2048    Or    acetaminophen (TYLENOL) Suppository 650 mg  650 mg Rectal Q4H PRN Marcell Grubbs PA-C        albuterol (PROVENTIL HFA/VENTOLIN HFA) inhaler  2 puff Inhalation Q6H PRN Marcell Grubbs PA-C   2 puff at 01/09/25 0021    amiodarone (NEXTERONE) 1.8 mg/mL in dextrose 5% 200 mL ADULT STANDARD infusion  0.5 mg/min Intravenous Continuous Kameron Wright MD 16.7 mL/hr at 01/20/25 0326 0.5 mg/min at 01/20/25 0326    atorvastatin (LIPITOR) tablet 20 mg  20 mg Oral or Feeding Tube QPM Kamryn Peguero MD   20 mg at 01/17/25 1930    B and C vitamin Complex with folic acid (NEPHRONEX) liquid 5 mL  5 mL Per Feeding Tube Daily Truman Moreland MD   5 mL at 01/18/25 0959    benzonatate (TESSALON) capsule 100 mg  100 mg Oral TID PRN Marcell Grubbs PA-C   100 mg at 01/08/25 2358    budesonide (PULMICORT) neb solution 1 mg  1 mg Nebulization BID Cheo Watt MD   1 mg at 01/19/25 1948    calcium carbonate (TUMS) chewable tablet 1,000 mg  1,000 mg Oral 4x Daily PRN Marcell Grubbs,  ILA        carboxymethylcellulose PF (REFRESH PLUS) 0.5 % ophthalmic solution 1 drop  1 drop Both Eyes Q1H PRN Ted Proctor APRN CNP        carvedilol (COREG) tablet 25 mg  25 mg Oral BID w/meals Ann Mitchell MD        cetirizine (zyrTEC) tablet 5 mg  5 mg Oral or Feeding Tube At Bedtime Kamryn Peguero MD   5 mg at 01/17/25 2158    dextrose 10% infusion   Intravenous Continuous PRN Truman Moreland MD        glucose gel 15-30 g  15-30 g Oral Q15 Min PRN Yuliet Navarro MD        Or    dextrose 50 % injection 25-50 mL  25-50 mL Intravenous Q15 Min PRN Yuliet Navarro MD        Or    glucagon injection 1 mg  1 mg Subcutaneous Q15 Min PRN Yuliet Navarro MD        fentaNYL (PF) (SUBLIMAZE) injection 25-50 mcg  25-50 mcg Intravenous Q1H PRN Cheo Watt MD   50 mcg at 01/19/25 0104    furosemide (LASIX) tablet 80 mg  80 mg Oral or NG Tube BID Cheo Watt MD   80 mg at 01/18/25 1623    [Held by provider] gabapentin (NEURONTIN) solution 100 mg  100 mg Oral or Feeding Tube Daily Kamryn Peguero MD   100 mg at 01/18/25 1007    [Held by provider] heparin 25,000 units in 0.45% NaCl 250 mL ANTICOAGULANT infusion  0-5,000 Units/hr Intravenous Continuous Cheo Watt MD   Stopped at 01/20/25 0846    insulin aspart (NovoLOG) injection (RAPID ACTING)  1-12 Units Subcutaneous Q4H Ravin King MD   5 Units at 01/20/25 1249    [Held by provider] insulin glargine (LANTUS PEN) injection 20 Units  20 Units Subcutaneous BID Ravin King MD   20 Units at 01/19/25 1336    ipratropium - albuterol 0.5 mg/2.5 mg/3 mL (DUONEB) neb solution 3 mL  3 mL Nebulization 4x daily Marcell Grubbs PA-C   3 mL at 01/19/25 1948    lidocaine (LMX4) cream   Topical Q1H PRN Marcell Grubbs PA-C        lidocaine 1 % 0.1-1 mL  0.1-1 mL Other Q1H PRN Marcell Grubbs PA-C        lidocaine 1 % 0.5 mL  0.5 mL Intradermal Once PRN EVELIA Hawley MD        lidocaine 1 % 0.5 mL  0.5 mL Intradermal  Once PRN EVELIA Hawley MD        propofol (DIPRIVAN) infusion  5-75 mcg/kg/min Intravenous Continuous Rufus Joy MD 6 mL/hr at 01/20/25 1240 10 mcg/kg/min at 01/20/25 1240    And    propofol (DIPRIVAN) bolus from bag or syringe pump  10 mg Intravenous Q15 Min PRN Rufus Joy MD        And    Medication Instruction   Does not apply Continuous PRN Rufus Joy MD        melatonin tablet 1 mg  1 mg Oral At Bedtime PRN Marcell Grubbs PA-C        methylPREDNISolone sodium succinate (SOLU-MEDROL) injection 40 mg  40 mg Intravenous Q24H Ravin King MD   40 mg at 01/20/25 0621    metoprolol (LOPRESSOR) injection 2.5-5 mg  2.5-5 mg Intravenous Q6H PRN Erick Zeng MD   5 mg at 01/20/25 1123    miconazole (MICATIN) 2 % powder   Topical BID PRN Marcell Grubbs PA-C        naloxone (NARCAN) injection 0.2 mg  0.2 mg Intravenous Q2 Min PRN Cheo Watt MD        Or    naloxone (NARCAN) injection 0.4 mg  0.4 mg Intravenous Q2 Min PRN Cheo Watt MD        Or    naloxone (NARCAN) injection 0.2 mg  0.2 mg Intramuscular Q2 Min PRN Cheo Watt MD        Or    naloxone (NARCAN) injection 0.4 mg  0.4 mg Intramuscular Q2 Min PRN Cheo Watt MD        No heparin via hemodialysis machine   Does not apply Once EVELIA Hawley MD        No lozenges or gum should be given while patient on BIPAP/AVAPS/AVAPS AE   Does not apply Continuous PRN Ted Proctor APRN CNP        ondansetron (ZOFRAN ODT) ODT tab 4 mg  4 mg Oral Q6H PRN Marcell Grubbs PA-C        Or    ondansetron (ZOFRAN) injection 4 mg  4 mg Intravenous Q6H PRN Marcell Grubbs PA-C        pantoprazole (PROTONIX) 2 mg/mL suspension 40 mg  40 mg Per Feeding Tube QAM AC Truman Moreland MD   40 mg at 01/16/25 0821    Or    pantoprazole (PROTONIX) IV push injection 40 mg  [facility]   40 mg at 01/19/25 0849    Patient may continue current oral medications   Does not apply  Continuous PRN Ted Proctor APRN CNP        senna-docusate (SENOKOT-S/PERICOLACE) 8.6-50 MG per tablet 1 tablet  1 tablet Oral BID PRN Marcell Grubbs PA-C   1 tablet at 01/13/25 1412    Or    senna-docusate (SENOKOT-S/PERICOLACE) 8.6-50 MG per tablet 2 tablet  2 tablet Oral BID PRN Marcell Grubbs PA-C   2 tablet at 01/14/25 0750    sertraline (ZOLOFT) tablet 75 mg  75 mg Oral or Feeding Tube QPM Kamryn Peguero MD   75 mg at 01/17/25 1930    sevelamer carbonate (RENVELA) tablet 800 mg  800 mg Oral or Feeding Tube TID w/meals Kamryn Peguero MD   800 mg at 01/18/25 1756    sodium chloride (PF) 0.9% PF flush 3 mL  3 mL Intracatheter Q8H Marcell Grubbs PA-C   3 mL at 01/20/25 1241    sodium chloride (PF) 0.9% PF flush 3 mL  3 mL Intracatheter q1 min prn Marcell Grubbs PA-C        sodium chloride 0.9% BOLUS 100-150 mL  100-150 mL Intravenous Q15 Min PRN EVELIA Hawley MD        sodium chloride 0.9% BOLUS 200 mL  200 mL Hemodialysis Machine Once EVELIA Hawley MD        sodium chloride 0.9% BOLUS 250 mL  250 mL Intravenous Once in dialysis/CRRT EVELIA Hawley MD        sodium chloride 0.9% BOLUS 500 mL  500 mL Hemodialysis Machine Once EVELIA Hawley MD        Stop Heparin 60 minutes before end of treatment   Does not apply Continuous PRN EVELIA Hawley MD        tacrolimus (PROTOPIC) 0.1 % ointment   Topical BID Marcell Grubbs PA-C   Given at 01/19/25 2103    vitamin D3 (CHOLECALCIFEROL) tablet 50 mcg  50 mcg Oral or Feeding Tube Daily Kamryn Peguero MD   50 mcg at 01/18/25 0958         ALLERGIES:     Allergies   Allergen Reactions    Ampicillin-Sulbactam Sodium Rash     No evidence SJS, but very uncomfortable and precipitated multiple provider visits. Would not use penicillins again if other options available.     Penicillins Rash         PHYSICAL EXAM:   Vitals were reviewed  Blood pressure 109/49, pulse (!) 121, temperature 98.4  F (36.9  C), temperature  "source Axillary, resp. rate 16, height 1.702 m (5' 7\"), weight 100.4 kg (221 lb 5.5 oz), SpO2 97%, not currently breastfeeding.  Temperatures:  Current - Temp: 98.4  F (36.9  C); Max - Temp  Av.2  F (36.8  C)  Min: 97.4  F (36.3  C)  Max: 98.6  F (37  C)  Respiration range: Resp  Av.5  Min: 6  Max: 44  Pulse range: Pulse  Av.3  Min: 89  Max: 141  Blood pressure range: Systolic (24hrs), Av , Min:98 , Max:173   ; Diastolic (24hrs), Av, Min:46, Max:114    Pulse oximetry range: SpO2  Av.3 %  Min: 83 %  Max: 99 %    Intake/Output Summary (Last 24 hours) at 2025 1341  Last data filed at 2025 1200  Gross per 24 hour   Intake 698.87 ml   Output --   Net 698.87 ml       GENERAL: Intubated and ventilated, sedated.    HEENT: No scleral icterus or conjunctival hemorrhage.    NECK: Supple without thyromegaly. No carotid bruits are present.  JVP difficult to assess.    CARDIOVASCULAR: Irregular tachycardic, no audible murmurs.      RESPIRATORY: Coarse breath sounds.      GASTROINTESTINAL: Soft, non-tender, normal bowel sounds.    PULSES: Grossly normal and symmetric lower extremity pulses.    EXTREMITIES: No clubbing or cyanosis. No edema.    SKIN: Warm and dry. No obvious rashes.    NEURO: Sedated.        LABS AND DIAGNOSTICS:  Labs personally reviewed by myself    CARDIAC ENZYMES:  No lab results found in last 7 days.  Recent Labs   Lab 25  0830   NTBNPI 49,[id number]*       GENERAL:  Recent Labs   Lab 25  1246 25  0804 25  0539 25  0505 25  2319 25  0617 25  0514 25  0615 25  0455 25  0819 25  0617   WBC  --   --  14.1*  --   --   --  14.9*  --  7.8   < >  --    HGB  --   --  10.2*  --   --   --  9.3*  --  9.2*   < >  --    MCV  --   --  99  --   --   --  98  --  97   < >  --    PLT  --   --  350  --   --   --  268  --  280   < >  --    NA  --   --  134*  --  133*  --  133*  --  131*   < > 130*   POTASSIUM  --   --  5.0  -- " " 4.6  --  4.7  --  4.9   < > 5.3   CHLORIDE  --   --  92*  --  93*  --  92*  --  89*   < > 89*   CO2  --   --  23  --  26  --  28  --  26   < > 27   BUN  --   --  77.0*  --  72.6*  --  55.3*  --  86.3*   < > 62.8*   CR  --   --  4.27*  --  4.05*  --  2.90*  --  3.97*   < > 3.48*   GFRESTIMATED  --   --  10*  --  11*  --  16*  --  11*   < > 13*   ANIONGAP  --   --  19*  --  14  --  13  --  16*   < > 14   JODY  --   --  10.7*  --  10.2  --  10.1  --  10.4   < > 10.6*   * 186* 184*   < > 123*   < > 138*   < > 160*   < > 362*   ALBUMIN  --   --   --   --   --   --   --   --   --   --  3.2*   PROTTOTAL  --   --   --   --   --   --   --   --   --   --  6.6   BILITOTAL  --   --   --   --   --   --   --   --   --   --  0.2   ALKPHOS  --   --   --   --   --   --   --   --   --   --  95   ALT  --   --   --   --   --   --   --   --   --   --  164*   AST  --   --   --   --   --   --   --   --   --   --  90*    < > = values in this interval not displayed.       No results for input(s): \"SED\", \"CRP\" in the last 168 hours.    No results for input(s): \"DD\" in the last 168 hours.    No results for input(s): \"TSH\" in the last 168 hours.    LIPIDS:  Recent Labs   Lab Test 04/21/23  1405 01/17/20  1204   CHOL 113 145   HDL 50 55   LDL 39 74   TRIG 122 78       BLOOD GASES:  Recent Labs   Lab 01/20/25  0830 01/17/25  1213 01/16/25  0901   PHV 7.24* 7.41 7.42   PO2V 57* 48* 58*   PCO2V 62* 49 46   HCO3V 27 31* 29*         ECG (personally reviewed):  1/20/2025: Atrial fibrillation with rapid ventricular response, nonspecific ST abnormality.    Telemetry (personally reviewed):  Atrial fibrillation with rapid ventricular response.    Right thoracentesis 1/20/2025:  0.1 liters of gelatinous clear yellow fluid were removed and sent to lab. No additional fluid could be removed.     Chest X-ray (personally reviewed) 1/20/2025:  IMPRESSION: Endotracheal and enteric tubes have been removed. Remainder unchanged. Small bilateral pleural " effusions with passive atelectasis in the lung bases. Calcified mitral annulus. Moderate degenerative change thoracic spine. Vascular stent left   axilla.     Echocardiogram: Pending.    Echocardiogram 11/24/2021:  1. Left ventricular systolic function is normal. The visual ejection fraction  is 60-65%.  2. No regional wall motion abnormalities noted.  3. The right ventricle is normal in structure, function and size.  4. The left atrium is moderately dilated.  5. No evidence for significant valvular pathology.      Imaging (past 48 hours):  Recent Results (from the past 48 hours)   XR Chest Port 1 View    Narrative    EXAM: XR CHEST PORT 1 VIEW  LOCATION: [facility]  DATE: 1/20/2025    INDICATION: Increase work of breathing  COMPARISON: 01/14/2025       Impression    IMPRESSION: Endotracheal and enteric tubes have been removed. Remainder unchanged. Small bilateral pleural effusions with passive atelectasis in the lung bases. Calcified mitral annulus. Moderate degenerative change thoracic spine. Vascular stent left   axilla.    CT Chest w/o Contrast    Narrative    EXAM: CT CHEST W/O CONTRAST  LOCATION: [facility]  DATE: 1/20/2025    INDICATION: r o new R plerual effusion  Acute resp failure, Inf A extubated 1 18.  COMPARISON: CT on 1/16/2024 and chest x-ray on 1/20/2025  TECHNIQUE: CT chest without IV contrast. Multiplanar reformats were obtained. Dose reduction techniques were used.  CONTRAST: None.    FINDINGS:   LUNGS AND PLEURA: Moderate right and small-to-moderate left pleural effusion and bibasilar compressive atelectasis, stable since 1/16/2025. Adjacent compressive atelectasis is slightly increased in the right lower lobe. Increased linear interlobular   septal thickening throughout the lungs, likely due to mild pulmonary edema. The central tracheobronchial tree is clear.    MEDIASTINUM/AXILLAE: Mildly enlarged mediastinal lymph nodes are stable. For  example, a 12 mm subcarinal lymph node (4/67) is unchanged. The NG tube has been removed. Mild cardiomegaly. Mildly enlarged main pulmonary artery consistent with pulmonary   hypertension. No thoracic aortic aneurysm. No pericardial effusion.    CORONARY ARTERY CALCIFICATION: Moderate to severe    UPPER ABDOMEN: Pancreatic parenchymal atrophy.    MUSCULOSKELETAL: No suspicious lesions in the bones.      Impression    IMPRESSION:   1.  Stable moderate right and small-to-moderate left pleural effusion. Right lower lobe compressive atelectasis has slightly increased.  2.  Mild pulmonary edema is new/increased.  3.  Stable cardiomegaly. Stable enlarged main pulmonary artery consistent with pulmonary hypertension.     CT Head w/o Contrast    Narrative    EXAM: CT HEAD W/O CONTRAST  LOCATION: [facility]  DATE: 1/20/2025    INDICATION: encephalopathy, r o bleed (on Heparin)  COMPARISON: None.  TECHNIQUE: Routine CT Head without IV contrast. Multiplanar reformats. Dose reduction techniques were used.    FINDINGS:  INTRACRANIAL CONTENTS: No intracranial hemorrhage, extraaxial collection, or mass effect.  No CT evidence of acute infarct. Mild presumed chronic small vessel ischemic changes. Mild generalized volume loss. No hydrocephalus.     VISUALIZED ORBITS/SINUSES/MASTOIDS: Prior bilateral cataract surgery. Visualized portions of the orbits are otherwise unremarkable. There is mucosal thickening and air-fluid level within the right maxillary sinus. Scattered mucosal thickening of the   ethmoid air cells and sphenoid sinuses. Scattered fluid/membrane thickening in the right mastoid air cells. No apparent mass in the posterior nasopharynx or skull base.    BONES/SOFT TISSUES: No acute abnormality. There is atherosclerotic plaque of the bilateral carotid arteries and vertebral arteries. Right temporomandibular joint osteoarthritis.      Impression    IMPRESSION:  1.  No evidence of intracranial  hemorrhage or other acute intracranial abnormality.  2.  Mild cerebral volume loss and presumed changes of chronic microvascular disease.  3.  Mucosal thickening and fluid level within the right maxillary sinus. Correlate for acute sinusitis.   US Thoracentesis    Narrative    EXAM:   1. RIGHT  THORACENTESIS  2. ULTRASOUND GUIDANCE  LOCATION: [facility]  DATE: 1/20/2025    INDICATION: Pleural effusion.    PROCEDURE: Informed consent obtained by telephone from the patient's daughter, Caroline Gray. Time out performed. The chest was prepped and draped in sterile fashion. 10 mL of 1 % lidocaine was infused into the local soft tissues. Under direct ultrasound   guidance, a 5 [place] catheter system was placed into the pleural effusion.     0.1  liters of gelatinous clear yellow  fluid were removed and sent to lab. No additional fluid could be removed.    Patient tolerated procedure well.    Ultrasound imaging was obtained and placed in the patient's permanent medical record.      Impression    IMPRESSION:  Status post right  ultrasound-guided thoracentesis.    Reference CPT Code: 72629           IMPRESSION AND PLAN:     Patient Active Problem List   Diagnosis    Anemia    Vitiligo    Traumatic amputation of leg above knee (H)    Contact dermatitis and other eczema, due to unspecified cause    Dermatophytosis of nail    Generalized osteoarthrosis, unspecified site    Hypertension goal BP (blood pressure) < 140/90    Moderate nonproliferative diabetic retinopathy associated with type 2 diabetes mellitus (H)    Proteinuria    Stage I pressure ulcer    Hyperlipidemia LDL goal <100    Non compliance with medical treatment    Incontinence of urine    Basal cell carcinoma    Senile nuclear sclerosis    JONE (obstructive sleep apnea)    CHF (congestive heart failure) (H)    Type 2 diabetes mellitus with diabetic chronic kidney disease (H)    Moderate recurrent major depression (H)    Type 2 diabetes mellitus  with diabetic neuropathy, with long-term current use of insulin (H)    Macular cyst, hole, or pseudohole of retina    Traumatic amputation of left lower extremity above knee, subsequent encounter    Former tobacco use    Type 2 diabetes mellitus (H)    Dyslipidemia    Anemia in chronic kidney disease    CKD (chronic kidney disease) stage 5, GFR less than 15 ml/min (H)    Obesity (BMI 30-39.9)    Anxiety and depression    Cervical cancer screening    Diabetic foot infection (H)    Plantar ulcer of right foot with fat layer exposed (H)    Cellulitis    Intertrigo    Drug rash    Wheelchair bound    Combined forms of age-related cataract of right eye    Pseudophakia of left eye    Anemia, iron deficiency    Chronic kidney disease, stage 5, kidney failure (H)    Encounter regarding vascular access for dialysis for ESRD (H)    Postoperative nausea    PVD (peripheral vascular disease)    ESRD on dialysis (H)    Encounter regarding vascular access for dialysis for end-stage renal disease (H)    Encounter for adjustment and management of vascular access device    ESRD (end stage renal disease) (H)    Steal syndrome as complication of dialysis access    Nausea    Infection due to 2019 novel coronavirus    Pneumonia due to COVID-19 virus    Hyperkalemia    ESRD (end stage renal disease) on dialysis (H)    Pneumonia of right lower lobe due to infectious organism    Hypervolemia, unspecified hypervolemia type    Major depressive disorder, recurrent    Class 2 severe obesity due to excess calories with serious comorbidity in adult (H)    Influenza A    Elevated troponin    Abnormal chest x-ray    Acute and chronic respiratory failure with hypoxia (H)    Anemia, unspecified type    Acute metabolic encephalopathy    Severe sepsis with acute organ dysfunction (H)    Pleural effusion       Supriya Herr is a 75 year old female with past medical history pertinent for chronic HFpEF, end-stage renal disease on hemodialysis, COPD,  type 2 diabetes mellitus, hypertension, dyslipidemia, obstructive sleep apnea (noncompliant with CPAP), diabetic neuropathy, anemia of chronic disease (baseline creatinine 9-10), right foot ulcer, coccyx pressure ulcer, depression, and multiple other noncardiac comorbidities who was admitted on 1/8/2025 with acute hypoxic respiratory failure secondary to influenza A pneumonia.  On 1/15/2025 she developed atrial fibrillation.     Atrial fibrillation, new diagnosis:  A.  Atrial fibrillation with rapid ventricular response; underlying clinical context of severe influenza A pneumonia and sepsis; multiple underlying comorbidities including obesity, type 2 diabetes mellitus, hypertension, obstructive sleep apnea (noncompliant with CPAP), and end-stage renal disease on hemodialysis.  B.  Long-term recommend rate control strategy.  Continue carvedilol 25 mg twice daily.  Okay to use IV amiodarone drip for now for supplemental rate control; would replace with oral diltiazem once sepsis results and improved blood pressure.  C.  Term DOAC, apixaban 5 mg twice daily.  Okay to use IV heparin for now; switch to DOAC prior to discharge.    Chronic HFpEF:  A.  Volume regulation via hemodialysis -defer to nephrology service.  Also takes furosemide 80 mg twice daily on nondialysis days.    Influenza A pneumonia with sepsis:  A.  Acute hypoxic respiratory failure, prolonged intubation from 1/8/2025 - 1/18/2025, now reintubated 1/20/2025.  Management per ICU staff.  She has been treated with Tamiflu and methylprednisolone.      Thank you very much for asking me to participate in the care of your patient Supriya Herr. Please do not hesitate to contact me if you have questions, or if I can be of further assistance.    High complexity     Adonis Valdovinos MD MD, FACC, FASE, [facility]
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
## 2025-01-20 NOTE — PROCEDURES
"[facility] Procedure Note          Intubation:      Date/Time: 12:22 PM  Performed by: Truman Moreland MD    Consent: The procedure was performed in an emergent situation.  Patient identity confirmed: verbally with patient and arm band  Time out: Immediately prior to procedure a \"time out\" was called to verify the correct patient, procedure, equipment, support staff and site/side marked as required.    Indication: Respiratory failure and airway protection    Procedure: Patient pre-oxygenated with BiPAP with 100% FiO2. Induced with 40 mg Propofol and paralyzed with 50 mg rocuronium. Video laryngoscope introduced and able to visualize vocal cords. 7.0F endotracheal tube placed successfully. Placement confirmed via bilateral auscultation and end tidal color change monitor. ETT secured at 21 cm at the teeth.     CXR pending.    Complications:  None    Patient tolerance: Patient tolerated the procedure well with no immediate complications.    Dr. Watt was available.    Truman Moreland  Surgical Critical Care Fellow  "

## 2025-01-20 NOTE — CONSULTS
"[facility] Nurse Inpatient Assessment     Consulted for: Pressure Injury \"R foot:  has thin skin and scarring/callus due to previous ulcers.   h/o L BKA so, this right foot is her only foot.  thank you!! \"     Summary: [facility] team received consult for \"WOC consult to help closely monitor her R foot - ensure no skin breakdown.\" It is within the primary RN scope of practice and per policy for the primary RN to monitor for changes in skin condition. Care orders were placed in the nursing care summary to support pressure injury prevention. [facility] team signing off at this time, please reconsult as needed.     [facility] nurse follow-up plan: signing off         01/20/25 0837  Skin care precautions  EFFECTIVE NOW        Comments: Pressure Injury Prevention (PIP) Plan:  If patient is declining pressure injury prevention interventions: Explore reason why and address patient's concerns, Educate on pressure injury risk and prevention intervention(s), If patient is still declining, document \"informed refusal\" , and Ensure Care team is aware ( provider, charge nurse, etc)  Mattress: Follow bed algorithm, add Low Air Loss (Air+) mattress pump if skin is very moist or constantly moist.  HOB: Maintain at or below 30 degrees, unless contraindicated  Repositioning in bed: Every 1-2 hours , Left/right positioning; avoid supine, Raise foot of bed prior to raising head of bed, to reduce patient sliding down (shear), and Frequent microturns using positioning wedges, as patient tolerates  Heels: Keep elevated off mattress, Pillows under calves, and Heel lift boots ([employer] Heel boot, primary RN to obtain #[id number])  Protective Dressing: Sacral Mepilex for prevention (#028286),  especially for the agitated patient  Chair positioning: Chair cushion (#[id number]) , Assist patient to reposition hourly, and Do NOT use a donut for sitting (this increases pressure to smaller area and creates a higher potential for injury)    If " patient has a buttock pressure injury, or high risk for PI use chair cushion or SPS.  Moisture Management: Perineal cleansing /protection: Follow Incontinence Protocol, Avoid brief in bed, Clean and dry skin folds with bathing , Consider InterDry (#[id number]) between folds, and Moisturize dry skin  Under Devices: Inspect skin under all medical devices during skin inspection , Ensure tubes are stabilized without tension, and Ensure patient is not lying on medical devices or equipment when repositioned  Ask provider to discontinue device when no longer needed.        01/21/25 0600  Wound care  DAILY        Comments: Right foot and heel: apply skin prep (sure prep or 3M no sting) to heel at least daily, let dry  Apply  Heel lift boots ([employer] Heel boot, primary RN to obtain #[id number]) per manufacture instructions          Wandy AGUIRRE   1st choice: Securely message with [employer] ([facility] [employer])   (2nd option: [facility] Office Phone [phone], messages checked periodically Mon-Fri 8a-4p)

## 2025-01-20 NOTE — CODE STATUS AND ADVANCE DIRECTIVES
[facility]  RRT (Rapid Response Team) House Officer OLVIN Progress Note  Date of Service: 1/20/2025   Time Called: 0759  RRT initiated due to (per RN): Confusion, A-fib with RVR          Assessment and Dx:                                         Assessment & Plan     Supriya Herr is a 75 year old female w/ PMH of  ESRD on HD, CHF, COPD, poor Mobility (L AKA, Obesity, WC bound), DM type II, hypertension, who was admitted on 1/8/2025 for acute on chronic respiratory failure with hypoxia and influenza A.  On 1/9 she was transferred to ICU and intubated due to worsening respiratory failure.  On 1/18 she was extubated, and subsequently transferred to hospitalist service for primary management.    RRT initiated today by RN for encephalopathy and A-fib with RVR.  Initial concern from the nurse is marked confusion encephalopathy, versus overnight RN report may have been better.  However subsequently talked with speech who saw patient 1/19, and collateral indicates encephalopathy is similar to yesterday.  Marked encephalopathy persists with pt hallucinating yesterday with daughter.  Able to awaken for me but is confused.  Overnight O2 needs of actually improved from 10 L down to 3 L still at 92%.  Remains in A-fib with RVR, on amiodarone drip, last got metoprolol at 6 AM.  On a heparin drip for same.    Working diagnosis/Impression:  Acute metabolic encephalopathy likely delirium with hallucinations, wax/wane  Acute on chronic respiratory failure, s/p intubation 1/9-1/18  Influenza A, pneumonia,   Chronic respiratory failure secondary to CHF, COPD, ESRD on hemodialysis  A-fib with RVR  DM 2, with history s/p left AKA  Dysphagia, with aspiration.    Differential diagnosis considered some of following (not exclusive):  Acute encephalopathy has multiple causes.  Though considered risk of heparin-induced head bleed other etiologies far more likely.Detailed discussion with hospitalist, at this point  stat head CT rule out the unlikely possibility of a head CT but that was thought lower.  Code stroke not indicated.  Remains on heparin         MDM/Interventions/New Orders/Plan:                                         -O2 needs-stable at present on 10 L  -Stat VBG (see below), lactate (WNL, troponin (65-defer to cardiology)  -Stat CXR 1 view-my wet read interpretation shows notable right-sided pleural effusion (malinda intensivist/concurs)  -R sided Thoracentesis recommended and discussed with Dr. Navarro hospitalist-who concurs and will order therapeutic/diagnostic.  -CT chest w/o contrast-re R pleural effusion , r/o L effusion intervention, eval influenza/rule out new infiltrates, assess vol  (see below)   -Stat VBG-resulted after RRT-note pH 7.2/hypercarbia increase with CO2 62, bicarb 27.  --Following with speech d/t aspiration with ice chips  --. Prefer thoracentesis w VBG recheck, before BiPAP consideration as high risk aspirate further.  Is full code.  May require reintubation    - s/p tamiflu QOD (renal dosing) x5, 1/9- ended 1/17.   - Re ABX none & no abx started during RRT, though note CT chest w/o clr new infiltrate- defer abx to hospitalist  --past ABX- S/p cefepime 1/9-1/13,   -- will be getting Diagnostic thoracentesis below      -A-fib with RVR stable rate 120s to 140s with cardiology to further manage, on amiodarone, heparin, metoprolol as needed  --Cardiology following.  Troponin elevation is similar to 1/9.  Unclear additional intervention needed    - Head Ct w/o bleed, cont close neuro checks  - hold sedating meds as able  - POCT     -Nephrology following, is on hemodialysis. Defer further diuresis (see CT chest) to renal/hospitalist    At the end of the RRT, pt appears to be relatively stable, /91, heart rate remaining 120s to 130s.  SpO2 92% on O2.  Able to awaken for me though confused.  There was multiple (>4) RRT's running near simultaneously thus left pt stable for another.       Disposition- remains in ICU room w close monitoring and plan as per above.  Curbside discussion with intensivist.  Hospitalist primary team    Discussed RRT in detail afterwards with Dr EVELIA Navarro, Hospitalist Attending Provider/Rounder, Detail discussion with Dr. Navarro was greatly appreciated and helpful re plan above.     Had multiple near simultaneous Rrts running and appreciate she will continue close monitoring of outstanding labs plus further cares including thoracentesis, will manage further cares/monitoring.     - Code Status: Full Code  defer future goals of care code discussion to hospitalist/family.    Allergies   Allergies   Allergen Reactions    Ampicillin-Sulbactam Sodium Rash     No evidence SJS, but very uncomfortable and precipitated multiple provider visits. Would not use penicillins again if other options available.     Penicillins Rash       Last 24H PRN:     metoprolol (LOPRESSOR) injection 2.5-5 mg, 5 mg at 01/20/25 7363         Subjective:   Interval History     -Main history was from the RN.  She was concerned initially that the patient appeared to be somewhat more somnolent encephalopathic than the overnight nurse.  However we locked out, the speech pathologist that saw the patient yesterday in 1/19 was here and noted the patient was quite confused yesterday.  Although able to follow commands and sitting up.  And was still aspirating and ice chips yesterday.  Patient was noted be confused.  There is report the patient was having visual hallucinations yesterday when the patient's daughter was here.  Overall history and presentation favors current encephalopathy is likely ICU related/hospital delirium with a waxing and waning status.  It was not clear that there was new focal neurologic changes.    Patient confused and somnolent.  She awakens can say her name very garbled speech but otherwise not much.  She is able to move all 4 extremities to command noting she has a history of a left  BKA.  And tries to follow commands but is confused.    Detail discussion with the ICU RN.  Appreciate cares.    I briefly reviewed the wet read/CXR findings with intensivist.  He concurs with my interpretation of new marked right-sided pleural effusion and a thoracentesis is advisable.     Detail discussion with Dr. Navarro regarding the above.  Also new pt to her.  Although the differential regarding encephalopathy is wide favoring respiratory causes among, I considered the much lower possibility that she had an unexpected head bleed we both concur think that is much lower likelihood.  They we will get a head CT noncontrast stat to rule that out and that stroke neurology activation was not indicated in both of our opinions at this point.  Continue very close monitoring.           Physical Exam:   Physical Exam   Vital Signs with Ranges:  Vitals:    01/20/25 0500 01/20/25 0600 01/20/25 0800 01/20/25 0815   BP: (!) 154/114 (!) 146/46 (!) 153/91 (!) 138/98   Pulse: (!) 125 (!) 121 (!) 128 110   Resp: (!) 31 26 (!) 33 (!) 44   Temp:       TempSrc:       SpO2: (!) 85% 94% 94% 96%   Weight: 100.4 kg (221 lb 5.5 oz)      Height:         Temp:  [98  F (36.7  C)-98.6  F (37  C)] 98  F (36.7  C)  Pulse:  [] 110  Resp:  [6-44] 44  BP: ()/() 138/98  SpO2:  [85 %-99 %] 96 %  I/O last 3 completed shifts:  In: 405.07 [I.V.:405.07]  Out: -     Lines, PIV ,   Constitutional: vs as above and/or per EMR  General: Adult patient lying in ICU bed.  Encephalopathic but able to respond.  On O2 at 3 L via mask at present.  A-fib with RVR on monitor anywhere from 120 average to occasional 130s.  /91.  HEENT: NC/AT,    Neck: w/o JVD, supple  CV: S1S2, tachycardic irregular, consistent with A-fib with RVR on telemetry.  No obvious  Resp: on O2 at 3, Spo2 92%, chest rise unlabored, maintaining airway.  No increased WOB.  Right-sided lungs are slightly decreased but coarse rhonchi noted.  No expiratory wheezes.  GI:  Soft, non peritoneal, no grimace with palp.  Musculoskeletal: Able to weakly move upper extremities and right lower.  S/p left AKA., no bony joint deformities, no lower edema   Skin: no obvious rashes on exposed skin  Lymph: defer  Neuro: non focal, no obvious droop, facies symmetric. Encephalopathic and confused.  Able to follow some commands           Data:   Data   CBC with Diff:  Recent Labs   Lab Test 01/20/25  0539 01/19/25  0514 01/18/25  0455   WBC 14.1* 14.9* 7.8   HGB 10.2* 9.3* 9.2*   MCV 99 98 97    268 280        Comprehensive Metabolic Panel:  Recent Labs   Lab 01/20/25  0804 01/20/25  0539 01/20/25  0505 01/19/25  2319 01/19/25  0617 01/19/25  0514 01/16/25  0819 01/16/25  0617   NA  --  134*  --  133*  --  133*   < > 130*   POTASSIUM  --  5.0  --  4.6  --  4.7   < > 5.3   CHLORIDE  --  92*  --  93*  --  92*   < > 89*   CO2  --  23  --  26  --  28   < > 27   ANIONGAP  --  19*  --  14  --  13   < > 14   * 184* 162* 123*   < > 138*   < > 362*   BUN  --  77.0*  --  72.6*  --  55.3*   < > 62.8*   CR  --  4.27*  --  4.05*  --  2.90*   < > 3.48*   GFRESTIMATED  --  10*  --  11*  --  16*   < > 13*   JODY  --  10.7*  --  10.2  --  10.1   < > 10.6*   MAG  --  2.5*  --   --   --   --   --   --    PHOS  --  7.8*  --   --   --   --   --   --    PROTTOTAL  --   --   --   --   --   --   --  6.6   ALBUMIN  --   --   --   --   --   --   --  3.2*   BILITOTAL  --   --   --   --   --   --   --  0.2   ALKPHOS  --   --   --   --   --   --   --  95   AST  --   --   --   --   --   --   --  90*   ALT  --   --   --   --   --   --   --  164*    < > = values in this interval not displayed.       VBG:    Recent Labs   Lab 01/20/25  0830 01/17/25  1213   PHV 7.24* 7.41   PCO2V 62* 49   PO2V 57* 48*   HCO3V 27 31*   ADELINA -1.6 5.2*   O2PER 5 30      ABG  Recent Labs   Lab 01/17/25  1213   O2PER 30       Lactic Acid:    Lab Results   Component Value Date    LACT 0.5 01/08/2025    LACT 0.6 05/19/2018    LACT 0.6  "11/01/2017         Troponin:     Latest Reference Range & Units 01/09/25 01:51 01/15/25 00:33 01/15/25 05:07 01/20/25 08:30   Troponin T, High Sensitivity <=14 ng/L 63 (H) 46 (H) 45 (H) 65 (H)   (H): Data is abnormally high     BNP:   Latest Reference Range & Units 01/08/25 07:42   N-Terminal Pro BNP Inpatient 0 - 900 pg/mL 30,737 (H)   (H): Data is abnormally high      Micro  Last UA:  No results for input(s): \"COLOR\", \"APPEARANCE\", \"URINEGLC\", \"URINEBILI\", \"URINEKETONE\", \"SG\", \"UBLD\", \"URINEPH\", \"PROTEIN\", \"UROBILINOGEN\", \"NITRITE\", \"LEUKEST\", \"RBCU\", \"WBCU\" in the last 168 hours.    7-Day Micro Results       No results found for the last 168 hours.            NICOM Testing indicated for fluid responsiveness: No       ECG:   January 20, 2025, Time ECG obtained: 0819  Interpreted by Stanton Guerrero PA-C, A-fib with RVR, rate 126, R axis,   No new clear ischemic or ST worsening, in fact ST depressions in lateral leads seem to be slightly improved in V5/6,  QTc 480    -Similar to comparison 1/15 comparison tracing,          IMAGING: past 3 days of current Admission   I personally reviewed the 1v CXR radiographic imaging I ordered. Our read was interval moderate R pleural effusion  Othe results pending at time of RRT     CT Head w/o Contrast 1/20/2025  EXAM: CT HEAD W/O CONTRAST LOCATION: [facility] DATE: 1/20/2025 INDICATION: encephalopathy, r o bleed (on Heparin) COMPARISON: None. TECHNIQUE: Routine CT Head without IV contrast. Multiplanar reformats. Dose reduction techniques were used. FINDINGS: INTRACRANIAL CONTENTS: No intracranial hemorrhage, extraaxial collection, or mass effect.  No CT evidence of acute infarct. Mild presumed chronic small vessel ischemic changes. Mild generalized volume loss. No hydrocephalus. VISUALIZED ORBITS/SINUSES/MASTOIDS: Prior bilateral cataract surgery. Visualized portions of the orbits are otherwise unremarkable. There is mucosal thickening and " air-fluid level within the right maxillary sinus. Scattered mucosal thickening of the ethmoid air cells and sphenoid sinuses. Scattered fluid/membrane thickening in the right mastoid air cells. No apparent mass in the posterior nasopharynx or skull base. BONES/SOFT TISSUES: No acute abnormality. There is atherosclerotic plaque of the bilateral carotid arteries and vertebral arteries. Right temporomandibular joint osteoarthritis.   IMPRESSION: 1.  No evidence of intracranial hemorrhage or other acute intracranial abnormality. 2.  Mild cerebral volume loss and presumed changes of chronic microvascular disease. 3.  Mucosal thickening and fluid level within the right maxillary sinus. Correlate for acute sinusitis.    CT Chest w/o Contrast: 1/20/2025  EXAM: CT CHEST W/O CONTRAST LOCATION: [facility] DATE: 1/20/2025 INDICATION: r o new R pleural effusion  Acute resp failure, Inf A extubated 1 18. COMPARISON: CT on 1/16/2024 and chest x-ray on 1/20/2025 TECHNIQUE: CT chest without IV contrast. Multiplanar reformats were obtained. Dose reduction techniques were used. CONTRAST: None. FINDINGS: LUNGS AND PLEURA: Moderate right and small-to-moderate left pleural effusion and bibasilar compressive atelectasis, stable since 1/16/2025. Adjacent compressive atelectasis is slightly increased in the right lower lobe. Increased linear interlobular septal thickening throughout the lungs, likely due to mild pulmonary edema. The central tracheobronchial tree is clear. MEDIASTINUM/AXILLAE: Mildly enlarged mediastinal lymph nodes are stable. For example, a 12 mm subcarinal lymph node (4/67) is unchanged. The NG tube has been removed. Mild cardiomegaly. Mildly enlarged main pulmonary artery consistent with pulmonary hypertension. No thoracic aortic aneurysm. No pericardial effusion. CORONARY ARTERY CALCIFICATION: Moderate to severe UPPER ABDOMEN: Pancreatic parenchymal atrophy. MUSCULOSKELETAL: No suspicious  lesions in the bones.   IMPRESSION: 1.  Stable moderate right and small-to-moderate left pleural effusion. Right lower lobe compressive atelectasis has slightly increased. 2.  Mild pulmonary edema is new/increased. 3.  Stable cardiomegaly. Stable enlarged main pulmonary artery consistent with pulmonary hypertension.     XR Chest Port 1 View 1/20/2025  EXAM: XR CHEST PORT 1 VIEW LOCATION: [facility] DATE: 1/20/2025 INDICATION: Increase work of breathing COMPARISON: 01/14/2025   IMPRESSION: Endotracheal and enteric tubes have been removed. Remainder unchanged. Small bilateral pleural effusions with passive atelectasis in the lung bases. Calcified mitral annulus. Moderate degenerative change thoracic spine. Vascular stent left axilla.        Time Spent on this Encounter   I spent 60 minutes of critical care time on the unit/floor managing the care of this patient. Upon evaluation, this patient had a high probability of imminent or life-threatening deterioration due to acute metabolic encephalopathy/delirium, acute on chronic hypoxic respiratory failure with interval right-sided pleural effusion, which required my direct attention, intervention, and personal management including  100% of my time was spent at the bedside, and coordinating care regarding services with multiple providers as noted, as well as 30 minutes separate documentation time     Stanton Guerrero PA-C  [facility]  OLVIN House Officer and Hospitalist  Securely message with the Vocera Web Console (learn more here)  Text page via [employer] Paging/Directory      Please note this documentation was partially completed using Dragon medical dictation transcription software. It was briefly proofread, but will likely have unrecognized and unintended incorrect words, numbers/values or phrases in transcription Also this is primarily intended as a direct peer to peer communication with medical abbreviations and  verbiage that may appear to be 'direct' as to succinctly relay medical info and opinions to care teams. If there are any questions on content/transcription, or diagnosis, please contact me immediately for clarification.

## 2025-01-20 NOTE — PROGRESS NOTES
Renal Medicine Progress Note                                Supriya Herr MRN# [id number]   Age: 75 year old YOB: 1950   Date of Admission: 1/8/2025 Hospital LOS: 12                  Assessment/Plan:     1) Influenza A with hypoxic resp failure.   On oseltamivir at ESRD dose.   Extubated   Remains above target weight although not overtly very hypervolemic.     2) end-stage renal disease   Chronic dialysis at [facility], Tuesday Thursday Saturday   Primary nephrologist Dr. Basilio  Access: Right AV fistula,   Run time 3.5 hours as outpatient     3) Anemia: HGB today 9. 2, stable she is on Mircera as outpt.  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  Vit D 2000 units daily.  Renvela 1600 mg TID c meals and 800 mg with snacks.      Dialysis 01.21.25 per TTS outpatient schedule  UF as tolerated to 3 liter       Interval History:     Extubated  Somnolent  O2 by FM    RRT earlier this am   Respiratory acidosis    Reviewed with ICU RN       ROS:     R: NEGATIVE for significant cough or SOB  CV: NEGATIVE for chest pain, palpitations  EXT: no change edema  ROS otherwise negative    Medications and Allergies:     Reviewed    Physical Exam:     Vitals were reviewed  Patient Vitals for the past 8 hrs:   BP Pulse Resp SpO2 Weight   01/20/25 0815 (!) 138/98 110 (!) 44 96 % --   01/20/25 0800 (!) 153/91 (!) 128 (!) 33 94 % --   01/20/25 0600 (!) 146/46 (!) 121 26 94 % --   01/20/25 0500 (!) 154/114 (!) 125 (!) 31 (!) 85 % 100.4 kg (221 lb 5.5 oz)   01/20/25 0400 (!) 167/75 108 -- 94 % --   01/20/25 0300 (!) 164/84 (!) 138 -- 91 % --   01/20/25 0200 (!) 164/74 114 16 92 % --     Wt Readings from Last 4 Encounters:   01/20/25 100.4 kg (221 lb 5.5 oz)   06/14/24 101.1 kg (222 lb 14.2 oz)   06/03/24 101.1 kg (222 lb 14.2 oz)   04/24/24 101.1 kg (222 lb 14.2 oz)     I/O last 3 completed  shifts:  In: 405.07 [I.V.:405.07]  Out: -     Vitals:    01/16/25 0300 01/17/25 0600 01/18/25 0200 01/19/25 0400   Weight: 101.8 kg (224 lb 6.9 oz) 100.7 kg (222 lb 0.1 oz) 101.2 kg (223 lb 1.7 oz) 99.3 kg (218 lb 14.7 oz)    01/20/25 0500   Weight: 100.4 kg (221 lb 5.5 oz)         GENERAL: mild distress and sedate  HEENT: NC/AT, PERRLA, EOMI, non icteric, pharynx moist without lesion  RESP:  clear anteriorly  CV: RRR, normal S1 S2  EXT: BKA/stasis changes     Data:     Recent Labs   Lab 01/20/25  0804 01/20/25  0539 01/20/25  0505 01/19/25  2319 01/19/25  0617 01/19/25  0514 01/18/25  0615 01/18/25  0455   NA  --  134*  --  133*  --  133*  --  131*   POTASSIUM  --  5.0  --  4.6  --  4.7  --  4.9   CHLORIDE  --  92*  --  93*  --  92*  --  89*   CO2  --  23  --  26  --  28  --  26   ANIONGAP  --  19*  --  14  --  13  --  16*   * 184* 162* 123*   < > 138*   < > 160*   BUN  --  77.0*  --  72.6*  --  55.3*  --  86.3*   CR  --  4.27*  --  4.05*  --  2.90*  --  3.97*   GFRESTIMATED  --  10*  --  11*  --  16*  --  11*   JODY  --  10.7*  --  10.2  --  10.1  --  10.4    < > = values in this interval not displayed.         Recent Labs   Lab 01/20/25  0804 01/20/25  0539   NA  --  134*   POTASSIUM  --  5.0   CHLORIDE  --  92*   CO2  --  23   ANIONGAP  --  19*   * 184*   BUN  --  77.0*   CR  --  4.27*   GFRESTIMATED  --  10*   JODY  --  10.7*     Recent Labs   Lab 01/20/25  0539 01/19/25  2319   POTASSIUM 5.0 4.6     Recent Labs   Lab 01/16/25  0617   ALBUMIN 3.2*     Recent Labs   Lab 01/20/25  0539 01/19/25  0514 01/18/25  0455 01/17/25  0237   PHOS 7.8*  --   --   --    HGB 10.2* 9.3* 9.2* 9.0*         G Dereck Hawley MD    [facility] - Nephrology  [phone]

## 2025-01-20 NOTE — PLAN OF CARE
SLP - Given re-intubation, will cancel SLP swallow Tx today.  Plan to check pt status over the next 2-3 days.

## 2025-01-20 NOTE — PROCEDURES
CENTRAL LINE INSERTION PROCEDURE NOTE    Procedure Date: 01/20/25   Performing Physician: Truman Moreland MD    Procedure:  Insertion of Central Venous Catheter Right Internal Jugular Vein  Pre-procedure diagnosis: Hypotension   Post-procedure diagnosis: same as above  Estimated Blood Loss:  5 mL  Complications: None     Procedure Details:   The risks, benefits, complications, treatment options, and expected outcomes were discussed with patient's daughter. The risks and potential complications of their problem and purposed procedure include but are not limited to infection, bleeding, pain, lung puncture, the need for additional procedures, creating a complication requiring transfusion or operation, and nerve and vessel injury.  The patient/alternate (see above) concurred with the proposed plan, giving informed consent.  A time out was held and the above information confirmed.    Under sterile conditions the skin over the right internal jugular vein was prepped with Chlorhexidine and covered with a sterile drape.  Strict sterile conditions were maintained,  Cap, mask, and sterile gloves were worn by all participants. 3 ml of Lidocaine 1% local anesthetic was infiltrated into the skin and subcutaneous tissues.  Using Sledinger technique an 18-gauge needle was then inserted into the vein under ultrasound guidance. A guide wire was then passed easily through the catheter. There were no arrythmias.  Guide wire placement was confirmed with ultrasound.  A 7.0 Spanish triple lumen CVC was then inserted into the vessel over the guide wire.  All ports were flushed with sterile solution and had good non-pulsatile blood return.  The catheter was sutured into place and an occlusive sterile dressing applied.  The patient tolerated the procedure well with no change in vital signs.     Number of attempts: 1     A chest x-ray was ordered.      Condition: Stable    Dr. Watt was present for the procedure.    [facility]  Critical Care Fellow

## 2025-01-20 NOTE — PLAN OF CARE
"Goal Outcome Evaluation:      Plan of Care Reviewed With: patient, family    Overall Patient Progress: improvingOverall Patient Progress: improving       VSS on 2 LPM NC. PRN metoprolol x1 provided with improvement in HR thereafter. Pt rested in bed between cares, able to turn/reposition this shift with use of lift. Up to chair with PT using lift, tolerated for approx 4 hrs. Denies pain this shift. Disoriented to time, able to answer other orientation questions appropriately. Pt began stating she observed elves, puppies and grandson in room throwing objects at her, none of which were factual. Re-orientation provided, support to daughter provided with education regarding importance of reorientation, therapeutic presence; daughter verbalized understanding. Remains NPO, unable to provide PO medications this shift. Incontinent of BM this shift, pt unaware. Remains anuric. Insulin gtt discontinued this shift, sliding scale insulin ordered. Transfer orders placed, awaiting bed availability. Tele Afib, RVR at times. Bed alarms in use, pt not attempting to exit bed. Continue to monitor.      Problem: Adult Inpatient Plan of Care  Goal: Plan of Care Review  Description: The Plan of Care Review/Shift note should be completed every shift.  The Outcome Evaluation is a brief statement about your assessment that the patient is improving, declining, or no change.  This information will be displayed automatically on your shift  note.  Outcome: Progressing  Flowsheets (Taken 1/19/2025 1848)  Plan of Care Reviewed With:   patient   family  Overall Patient Progress: improving  Goal: Patient-Specific Goal (Individualized)  Description: You can add care plan individualizations to a care plan. Examples of Individualization might be:  \"Parent requests to be called daily at 9am for status\", \"I have a hard time hearing out of my right ear\", or \"Do not touch me to wake me up as it startles  me\".  Outcome: Progressing  Goal: Absence of " Hospital-Acquired Illness or Injury  Outcome: Progressing  Intervention: Identify and Manage Fall Risk  Recent Flowsheet Documentation  Taken 1/19/2025 1700 by Diana Clark RN  Safety Promotion/Fall Prevention:   treat underlying cause   treat reversible contributory factors   supervised activity   safety round/check completed   room organization consistent   room near nurse's station   patient and family education   nonskid shoes/slippers when out of bed   mobility aid in reach   lighting adjusted   increase visualization of patient   increased rounding and observation   clutter free environment maintained   activity supervised   assistive device/personal items within reach  Taken 1/19/2025 0800 by Diana Clark, RN  Safety Promotion/Fall Prevention:   treat underlying cause   treat reversible contributory factors   supervised activity   safety round/check completed   room organization consistent   room near nurse's station   patient and family education   nonskid shoes/slippers when out of bed   mobility aid in reach   lighting adjusted   increase visualization of patient   increased rounding and observation   clutter free environment maintained   activity supervised   assistive device/personal items within reach  Intervention: Prevent Skin Injury  Recent Flowsheet Documentation  Taken 1/19/2025 1700 by Diana Clark, RN  Body Position:   turned   log-rolled   legs elevated   heels elevated   foot of bed elevated   side-lying 30 degrees   weight shifting   upper extremity elevated   neutral body alignment   lower extremity elevated  Skin Protection:   adhesive use limited   skin to device areas padded   silicone foam dressing in place   pulse oximeter probe site changed   tubing/devices free from skin contact   skin sealant/moisture barrier applied  Taken 1/19/2025 0800 by Diana Clark, RN  Body Position:   turned   log-rolled   legs elevated   heels elevated   foot of bed elevated   side-lying 30  degrees   weight shifting   upper extremity elevated   neutral body alignment   lower extremity elevated  Skin Protection:   adhesive use limited   skin to device areas padded   silicone foam dressing in place   pulse oximeter probe site changed   tubing/devices free from skin contact   skin sealant/moisture barrier applied  Intervention: Prevent and Manage VTE (Venous Thromboembolism) Risk  Recent Flowsheet Documentation  Taken 1/19/2025 1700 by Diana Clark RN  VTE Prevention/Management: SCDs off (sequential compression devices)  Taken 1/19/2025 0800 by Diana Clark RN  VTE Prevention/Management: SCDs off (sequential compression devices)  Intervention: Prevent Infection  Recent Flowsheet Documentation  Taken 1/19/2025 1700 by Diana Clark RN  Infection Prevention:   single patient room provided   rest/sleep promoted   personal protective equipment utilized   hand hygiene promoted   equipment surfaces disinfected  Taken 1/19/2025 0800 by Diana Clark RN  Infection Prevention:   single patient room provided   rest/sleep promoted   personal protective equipment utilized   hand hygiene promoted   equipment surfaces disinfected  Goal: Optimal Comfort and Wellbeing  Outcome: Progressing  Intervention: Monitor Pain and Promote Comfort  Recent Flowsheet Documentation  Taken 1/19/2025 0800 by Diana Clark RN  Pain Management Interventions:   pillow support provided   rest   repositioned   pain pump in use   pain management plan reviewed with patient/caregiver   quiet environment facilitated   emotional support   care clustered  Intervention: Provide Person-Centered Care  Recent Flowsheet Documentation  Taken 1/19/2025 1700 by Diana Clark RN  Trust Relationship/Rapport:   care explained   choices provided   reassurance provided   questions encouraged   questions answered  Taken 1/19/2025 0800 by Diana Clark RN  Trust Relationship/Rapport:   care explained   choices provided   reassurance  provided   questions encouraged   questions answered  Goal: Readiness for Transition of Care  Outcome: Progressing     Problem: Fall Injury Risk  Goal: Absence of Fall and Fall-Related Injury  Outcome: Progressing  Intervention: Identify and Manage Contributors  Recent Flowsheet Documentation  Taken 1/19/2025 1700 by Diana Clark RN  Medication Review/Management:   high-risk medications identified   medications reviewed  Taken 1/19/2025 0800 by Diana Clark RN  Medication Review/Management:   high-risk medications identified   medications reviewed  Intervention: Promote Injury-Free Environment  Recent Flowsheet Documentation  Taken 1/19/2025 1700 by Diana Clark, RN  Safety Promotion/Fall Prevention:   treat underlying cause   treat reversible contributory factors   supervised activity   safety round/check completed   room organization consistent   room near nurse's station   patient and family education   nonskid shoes/slippers when out of bed   mobility aid in reach   lighting adjusted   increase visualization of patient   increased rounding and observation   clutter free environment maintained   activity supervised   assistive device/personal items within reach  Taken 1/19/2025 0800 by Diana Clark RN  Safety Promotion/Fall Prevention:   treat underlying cause   treat reversible contributory factors   supervised activity   safety round/check completed   room organization consistent   room near nurse's station   patient and family education   nonskid shoes/slippers when out of bed   mobility aid in reach   lighting adjusted   increase visualization of patient   increased rounding and observation   clutter free environment maintained   activity supervised   assistive device/personal items within reach     Problem: Pain Acute  Goal: Optimal Pain Control and Function  Outcome: Progressing  Intervention: Optimize Psychosocial Wellbeing  Recent Flowsheet Documentation  Taken 1/19/2025 1700 by  Diana Clark RN  Supportive Measures:   active listening utilized   positive reinforcement provided   relaxation techniques promoted  Taken 1/19/2025 0800 by Diana Clark RN  Supportive Measures:   active listening utilized   positive reinforcement provided   relaxation techniques promoted  Intervention: Develop Pain Management Plan  Recent Flowsheet Documentation  Taken 1/19/2025 0800 by Diana Clark RN  Pain Management Interventions:   pillow support provided   rest   repositioned   pain pump in use   pain management plan reviewed with patient/caregiver   quiet environment facilitated   emotional support   care clustered  Intervention: Prevent or Manage Pain  Recent Flowsheet Documentation  Taken 1/19/2025 1700 by Diana Clark RN  Sensory Stimulation Regulation:   care clustered   quiet environment promoted  Medication Review/Management:   high-risk medications identified   medications reviewed  Taken 1/19/2025 0800 by Diana Clark RN  Sensory Stimulation Regulation:   care clustered   quiet environment promoted  Medication Review/Management:   high-risk medications identified   medications reviewed     Problem: Gas Exchange Impaired  Goal: Optimal Gas Exchange  Outcome: Progressing  Intervention: Optimize Oxygenation and Ventilation  Recent Flowsheet Documentation  Taken 1/19/2025 1700 by Diana Clark RN  Airway/Ventilation Management:   airway patency maintained   pulmonary hygiene promoted   calming measures promoted   position adjusted   oxygen therapy provided  Head of Bed (HOB) Positioning: HOB at 30 degrees  Taken 1/19/2025 0800 by Diana Clark RN  Airway/Ventilation Management:   airway patency maintained   pulmonary hygiene promoted   calming measures promoted   position adjusted   oxygen therapy provided  Head of Bed (HOB) Positioning: HOB at 30 degrees     Problem: Risk for Delirium  Goal: Optimal Coping  Outcome: Progressing  Intervention: Optimize Psychosocial  Adjustment to Delirium  Recent Flowsheet Documentation  Taken 1/19/2025 1700 by Diana Clark RN  Supportive Measures:   active listening utilized   positive reinforcement provided   relaxation techniques promoted  Taken 1/19/2025 0800 by Diana Clark RN  Supportive Measures:   active listening utilized   positive reinforcement provided   relaxation techniques promoted  Goal: Improved Behavioral Control  Outcome: Progressing  Intervention: Prevent and Manage Agitation  Recent Flowsheet Documentation  Taken 1/19/2025 1700 by Diana Clark RN  Environment Familiarity/Consistency: daily routine followed  Taken 1/19/2025 0800 by Diana Clark RN  Environment Familiarity/Consistency: daily routine followed  Intervention: Minimize Safety Risk  Recent Flowsheet Documentation  Taken 1/19/2025 1700 by Diana Clark RN  Enhanced Safety Measures:   room near unit station   review medications for side effects with activity   patient/family teach back on injury risk   pain management   monitor patients coagulation values   family to remain at bedside   assistive devices when indicated  Trust Relationship/Rapport:   care explained   choices provided   reassurance provided   questions encouraged   questions answered  Taken 1/19/2025 0800 by Diana Clark RN  Enhanced Safety Measures:   room near unit station   review medications for side effects with activity   patient/family teach back on injury risk   pain management   monitor patients coagulation values   family to remain at bedside   assistive devices when indicated  Trust Relationship/Rapport:   care explained   choices provided   reassurance provided   questions encouraged   questions answered  Goal: Improved Attention and Thought Clarity  Outcome: Progressing  Intervention: Maximize Cognitive Function  Recent Flowsheet Documentation  Taken 1/19/2025 1700 by Diana Clark RN  Sensory Stimulation Regulation:   care clustered   quiet environment  promoted  Reorientation Measures:   reorientation provided   clock in view   calendar in view  Taken 1/19/2025 0800 by Diana Clark, RN  Sensory Stimulation Regulation:   care clustered   quiet environment promoted  Reorientation Measures:   reorientation provided   clock in view   calendar in view  Goal: Improved Sleep  Outcome: Progressing

## 2025-01-20 NOTE — PLAN OF CARE
"Neuro: PERRL, opens eyes to voice, follows commands  CV: Tele afib rvr  Resp: LS coarse, NC 2-3L, oxymask   Gtts: heparin and amio  GI: BM x2  : no UOP  Skin: see chart  Activity: bedrest    Goal Outcome Evaluation:    Problem: Gas Exchange Impaired  Goal: Optimal Gas Exchange  Outcome: Not Progressing  Intervention: Optimize Oxygenation and Ventilation  Recent Flowsheet Documentation  Taken 1/20/2025 0400 by Shane Loja RN  Airway/Ventilation Management:   airway patency maintained   pulmonary hygiene promoted   calming measures promoted   position adjusted   oxygen therapy provided  Head of Bed (HOB) Positioning: HOB at 30 degrees  Taken 1/20/2025 0200 by Shane Loja RN  Head of Bed (HOB) Positioning: HOB at 30 degrees  Taken 1/20/2025 0000 by Shane Loja RN  Airway/Ventilation Management:   airway patency maintained   pulmonary hygiene promoted   calming measures promoted   position adjusted   oxygen therapy provided  Head of Bed (HOB) Positioning: HOB at 30 degrees  Taken 1/19/2025 2200 by Shane Loja RN  Head of Bed (HOB) Positioning: HOB at 30 degrees  Taken 1/19/2025 2000 by Shane Loja RN  Airway/Ventilation Management:   airway patency maintained   pulmonary hygiene promoted   calming measures promoted   position adjusted   oxygen therapy provided  Head of Bed (HOB) Positioning: HOB at 30 degrees     Problem: Adult Inpatient Plan of Care  Goal: Plan of Care Review  Description: The Plan of Care Review/Shift note should be completed every shift.  The Outcome Evaluation is a brief statement about your assessment that the patient is improving, declining, or no change.  This information will be displayed automatically on your shift  note.  Outcome: Progressing  Goal: Patient-Specific Goal (Individualized)  Description: You can add care plan individualizations to a care plan. Examples of Individualization might be:  \"Parent requests to be called daily at 9am for status\", \"I have a " "hard time hearing out of my right ear\", or \"Do not touch me to wake me up as it startles  me\".  Outcome: Progressing  Goal: Absence of Hospital-Acquired Illness or Injury  Outcome: Progressing  Intervention: Identify and Manage Fall Risk  Recent Flowsheet Documentation  Taken 1/20/2025 0400 by Shane Loja, RN  Safety Promotion/Fall Prevention:   treat underlying cause   treat reversible contributory factors   supervised activity   safety round/check completed   room organization consistent   room near nurse's station   patient and family education   nonskid shoes/slippers when out of bed   mobility aid in reach   lighting adjusted   increase visualization of patient   increased rounding and observation   clutter free environment maintained   activity supervised   assistive device/personal items within reach  Taken 1/20/2025 0000 by Shane Loja RN  Safety Promotion/Fall Prevention:   treat underlying cause   treat reversible contributory factors   supervised activity   safety round/check completed   room organization consistent   room near nurse's station   patient and family education   nonskid shoes/slippers when out of bed   mobility aid in reach   lighting adjusted   increase visualization of patient   increased rounding and observation   clutter free environment maintained   activity supervised   assistive device/personal items within reach  Taken 1/19/2025 2000 by Shane Loja RN  Safety Promotion/Fall Prevention:   treat underlying cause   treat reversible contributory factors   supervised activity   safety round/check completed   room organization consistent   room near nurse's station   patient and family education   nonskid shoes/slippers when out of bed   mobility aid in reach   lighting adjusted   increase visualization of patient   increased rounding and observation   clutter free environment maintained   activity supervised   assistive device/personal items within reach  Intervention: " Prevent Skin Injury  Recent Flowsheet Documentation  Taken 1/20/2025 0400 by Shane Loja RN  Body Position:   turned   legs elevated   heels elevated   foot of bed elevated   side-lying 30 degrees   weight shifting   upper extremity elevated   lower extremity elevated  Skin Protection:   adhesive use limited   skin to device areas padded   silicone foam dressing in place   pulse oximeter probe site changed   tubing/devices free from skin contact   skin sealant/moisture barrier applied  Taken 1/20/2025 0200 by Shane Loja RN  Body Position:   turned   legs elevated   heels elevated   foot of bed elevated   side-lying 30 degrees   weight shifting   upper extremity elevated   lower extremity elevated  Taken 1/20/2025 0000 by Shane Loja RN  Body Position:   turned   legs elevated   heels elevated   foot of bed elevated   side-lying 30 degrees   weight shifting   upper extremity elevated   lower extremity elevated  Skin Protection:   adhesive use limited   skin to device areas padded   silicone foam dressing in place   pulse oximeter probe site changed   tubing/devices free from skin contact   skin sealant/moisture barrier applied  Taken 1/19/2025 2200 by Shane Loja RN  Body Position:   turned   log-rolled   legs elevated   heels elevated   foot of bed elevated   side-lying 30 degrees   weight shifting   upper extremity elevated   neutral body alignment   lower extremity elevated  Taken 1/19/2025 2000 by Shane Loja RN  Body Position:   turned   log-rolled   legs elevated   heels elevated   foot of bed elevated   side-lying 30 degrees   weight shifting   upper extremity elevated   neutral body alignment   lower extremity elevated  Skin Protection:   adhesive use limited   skin to device areas padded   silicone foam dressing in place   pulse oximeter probe site changed   tubing/devices free from skin contact   skin sealant/moisture barrier applied  Intervention: Prevent and Manage VTE  (Venous Thromboembolism) Risk  Recent Flowsheet Documentation  Taken 1/20/2025 0400 by Shane Loja RN  VTE Prevention/Management: SCDs off (sequential compression devices)  Taken 1/20/2025 0000 by Shane Loja RN  VTE Prevention/Management: SCDs off (sequential compression devices)  Taken 1/19/2025 2000 by Shane Loja RN  VTE Prevention/Management: SCDs off (sequential compression devices)  Intervention: Prevent Infection  Recent Flowsheet Documentation  Taken 1/20/2025 0400 by Shane Loja RN  Infection Prevention:   single patient room provided   rest/sleep promoted   personal protective equipment utilized   hand hygiene promoted   equipment surfaces disinfected  Taken 1/20/2025 0000 by Shane Loja RN  Infection Prevention:   single patient room provided   rest/sleep promoted   personal protective equipment utilized   hand hygiene promoted   equipment surfaces disinfected  Taken 1/19/2025 2000 by Shane Loja RN  Infection Prevention:   single patient room provided   rest/sleep promoted   personal protective equipment utilized   hand hygiene promoted   equipment surfaces disinfected  Goal: Optimal Comfort and Wellbeing  Outcome: Progressing  Intervention: Provide Person-Centered Care  Recent Flowsheet Documentation  Taken 1/20/2025 0400 by Shane Loja RN  Trust Relationship/Rapport:   care explained   choices provided   reassurance provided   questions encouraged   questions answered  Taken 1/20/2025 0000 by Shane Loja RN  Trust Relationship/Rapport:   care explained   choices provided   reassurance provided   questions encouraged   questions answered  Taken 1/19/2025 2000 by Shane Loja RN  Trust Relationship/Rapport:   care explained   choices provided   reassurance provided   questions encouraged   questions answered  Goal: Readiness for Transition of Care  Outcome: Progressing     Problem: Fall Injury Risk  Goal: Absence of Fall and Fall-Related  Injury  Outcome: Progressing  Intervention: Identify and Manage Contributors  Recent Flowsheet Documentation  Taken 1/20/2025 0400 by Shane Loja RN  Medication Review/Management:   high-risk medications identified   medications reviewed  Taken 1/20/2025 0000 by Shane Loja RN  Medication Review/Management:   high-risk medications identified   medications reviewed  Taken 1/19/2025 2000 by Shane Loja RN  Medication Review/Management:   high-risk medications identified   medications reviewed  Intervention: Promote Injury-Free Environment  Recent Flowsheet Documentation  Taken 1/20/2025 0400 by Shane Loja RN  Safety Promotion/Fall Prevention:   treat underlying cause   treat reversible contributory factors   supervised activity   safety round/check completed   room organization consistent   room near nurse's station   patient and family education   nonskid shoes/slippers when out of bed   mobility aid in reach   lighting adjusted   increase visualization of patient   increased rounding and observation   clutter free environment maintained   activity supervised   assistive device/personal items within reach  Taken 1/20/2025 0000 by Shane Loja RN  Safety Promotion/Fall Prevention:   treat underlying cause   treat reversible contributory factors   supervised activity   safety round/check completed   room organization consistent   room near nurse's station   patient and family education   nonskid shoes/slippers when out of bed   mobility aid in reach   lighting adjusted   increase visualization of patient   increased rounding and observation   clutter free environment maintained   activity supervised   assistive device/personal items within reach  Taken 1/19/2025 2000 by Shane Loja RN  Safety Promotion/Fall Prevention:   treat underlying cause   treat reversible contributory factors   supervised activity   safety round/check completed   room organization consistent   room near  nurse's station   patient and family education   nonskid shoes/slippers when out of bed   mobility aid in reach   lighting adjusted   increase visualization of patient   increased rounding and observation   clutter free environment maintained   activity supervised   assistive device/personal items within reach     Problem: Pain Acute  Goal: Optimal Pain Control and Function  Outcome: Progressing  Intervention: Optimize Psychosocial Wellbeing  Recent Flowsheet Documentation  Taken 1/20/2025 0400 by Shane Loja RN  Supportive Measures:   active listening utilized   positive reinforcement provided   relaxation techniques promoted  Taken 1/20/2025 0000 by Shane Loja RN  Supportive Measures:   active listening utilized   positive reinforcement provided   relaxation techniques promoted  Taken 1/19/2025 2000 by Shane Loja RN  Supportive Measures:   active listening utilized   positive reinforcement provided   relaxation techniques promoted  Intervention: Prevent or Manage Pain  Recent Flowsheet Documentation  Taken 1/20/2025 0400 by Shane Loja RN  Sensory Stimulation Regulation:   care clustered   quiet environment promoted  Medication Review/Management:   high-risk medications identified   medications reviewed  Taken 1/20/2025 0000 by Shane Loja RN  Sensory Stimulation Regulation:   care clustered   quiet environment promoted  Medication Review/Management:   high-risk medications identified   medications reviewed  Taken 1/19/2025 2000 by Shane Loja RN  Sensory Stimulation Regulation:   care clustered   quiet environment promoted  Medication Review/Management:   high-risk medications identified   medications reviewed     Problem: Risk for Delirium  Goal: Optimal Coping  Outcome: Progressing  Intervention: Optimize Psychosocial Adjustment to Delirium  Recent Flowsheet Documentation  Taken 1/20/2025 0400 by Shane Loja RN  Supportive Measures:   active listening utilized    positive reinforcement provided   relaxation techniques promoted  Taken 1/20/2025 0000 by Shane Loja RN  Supportive Measures:   active listening utilized   positive reinforcement provided   relaxation techniques promoted  Taken 1/19/2025 2000 by Shane Loja RN  Supportive Measures:   active listening utilized   positive reinforcement provided   relaxation techniques promoted  Goal: Improved Behavioral Control  Outcome: Progressing  Intervention: Prevent and Manage Agitation  Recent Flowsheet Documentation  Taken 1/20/2025 0400 by Shane Loja RN  Environment Familiarity/Consistency: daily routine followed  Taken 1/20/2025 0000 by Shane Loja RN  Environment Familiarity/Consistency: daily routine followed  Taken 1/19/2025 2000 by Shane Loja RN  Environment Familiarity/Consistency: daily routine followed  Intervention: Minimize Safety Risk  Recent Flowsheet Documentation  Taken 1/20/2025 0400 by Shane Loja RN  Enhanced Safety Measures:   room near unit station   review medications for side effects with activity   patient/family teach back on injury risk   pain management   monitor patients coagulation values   family to remain at bedside   assistive devices when indicated  Trust Relationship/Rapport:   care explained   choices provided   reassurance provided   questions encouraged   questions answered  Taken 1/20/2025 0000 by Shane Loja RN  Enhanced Safety Measures:   room near unit station   review medications for side effects with activity   patient/family teach back on injury risk   pain management   monitor patients coagulation values   family to remain at bedside   assistive devices when indicated  Trust Relationship/Rapport:   care explained   choices provided   reassurance provided   questions encouraged   questions answered  Taken 1/19/2025 2000 by Shane Loja RN  Enhanced Safety Measures:   room near unit station   review medications for side effects with  activity   patient/family teach back on injury risk   pain management   monitor patients coagulation values   family to remain at bedside   assistive devices when indicated  Trust Relationship/Rapport:   care explained   choices provided   reassurance provided   questions encouraged   questions answered  Goal: Improved Attention and Thought Clarity  Outcome: Progressing  Intervention: Maximize Cognitive Function  Recent Flowsheet Documentation  Taken 1/20/2025 0400 by Shane Loja, RN  Sensory Stimulation Regulation:   care clustered   quiet environment promoted  Reorientation Measures:   reorientation provided   clock in view   calendar in view  Taken 1/20/2025 0000 by Shane Loja, RN  Sensory Stimulation Regulation:   care clustered   quiet environment promoted  Reorientation Measures:   reorientation provided   clock in view   calendar in view  Taken 1/19/2025 2000 by Shane Loja, RN  Sensory Stimulation Regulation:   care clustered   quiet environment promoted  Reorientation Measures:   reorientation provided   clock in view   calendar in view  Goal: Improved Sleep  Outcome: Progressing

## 2025-01-20 NOTE — PROGRESS NOTES
Intubation Note    Date of intubation: 1/20/24  Time of intubation: 1215  Location of intubation: ICU  Intubated by: CRNA/MD  Location (cm) at lip: 21    ETT placement confirmed by: Color change, BS, CXR      1/20/2025  Robert Velez, RRT

## 2025-01-20 NOTE — CONSULTS
Care Management already following for discharge planning.  Please see consult from 1/17/2025.    Gianna Pérez RN, BSN, OCN   Inpatient Care Coordination [facility]  Office: [phone]

## 2025-01-20 NOTE — PROGRESS NOTES
ICU Progress Note   Date of Service: 01/20/25    Assessment and Plan:  Supriya Herr is a 74 year old female with a history of ESRD on HD, CHF, COPD, DM type II, hypertension who was admitted 1/8 with influenza A. This morning during dialysis, an RRT was called for hypoxia and she was intubated. The patient was admitted to the ICU for medical management. Patient was managed for fluid overload and COPD exacerbation and was extubated on 1/18.     Interval events:   Developed respiratory distress and hypoxia this am requiring re-intubation, became hypotensive requiring norepi. Had thora this am with reportedly a few hundred mls.     Neuro:  # Sedation for mechanical ventilation  # Likely baseline cognitive impairment  - Precedex for sedation  - RASS goal of 0 to -1  - PTA Zoloft, gabapentin     CV:  # Shock, likely due to sedation  # Hx of CHF  # Hx of HTN, HLD  # Afib RVR  - MAP goal > 65, low dose norepi  - Continue amidarone gtt for now  - IV heparin gtt  - Hold home amlodipine, carvedilol     Pulm:  # Acute hypoxemic respiratory failure  # Flu A  # COPD exacerbation  # JONE  - low tidal volume ventilation  - BLE Doppler US negative 1/9  - CTA chest 1/16 negative for PE but with b/l effusions and basilar atelectasis  - Increase PEEP to 8 to help with atelectasis and body habitus  - Had 2L removed with iHD yesterday  - Tolerated 10/8 PS trial today which is better than yesterday  - Plan for SBT tomorrow after iHD  - S/p 5 days of abx coverage  - Continue methylpred 40mg q24 now  - Continue nebs  - Cont pulmicort nebs BID  - CXR post reintubation appears to be consistent with fluid overload  - Appreciate nephrology help with fluid removal via iHD, can support with NE to help with fluid removal     GI:  - RD to manage TF     Renal:  # ESRD on HD  - Nephrology consult, appreciate recs  - Fluid removal with iHD  - Can use low dose pressors as needed to help with fluid removal during iHD     ID:  # Influenza A  "pneumonia  # ? Community acquired pneumonia   - sputum culture no growth   - to complete 10 days of Oseltamivir today   - S/p 5 days of cefepime       Heme:  #Chronic anemia   - Hgb baseline 9-10   - Transfuse for Hgb < 7     # Afib new onset  - resume heparin gtt after thora  - can switch to DOAC once clinically stable      Endo:  # Hx of DMII  - rising hyperglycemia in the setting of steroids  - start insulin gtt, once steroids are weaned can switch back to sliding scale insulin +/- glargine     PPx:  1. DVT: heparin gtt  2. VAP: HOB 30 degrees, chlorhexidine rinse  3. Stress Ulcer: PPI  4. Restraints: Nonviolent soft two point restraints required and necessary for patient safety and continued cares and good effect as patient continues to pull at necessary lines, tubes despite education and distraction. Will readdress daily.   5. Wound care - per unit routine   6. Feeding - TF  7. Family updated at the bedside (daughter)     Dispo: ICU      /77   Pulse (!) 124   Temp 98.4  F (36.9  C) (Axillary)   Resp 16   Ht 1.702 m (5' 7\")   Wt 100.4 kg (221 lb 5.5 oz)   LMP  (LMP Unknown)   SpO2 98%   BMI 34.67 kg/m      FiO2 (%): 50 %, Resp: 16, Vent Mode: CMV/AC, Resp Rate (Set): 16 breaths/min, Tidal Volume (Set, mL): 450 mL, PEEP (cm H2O): 5 cmH2O, Resp Rate (Set): 16 breaths/min, Tidal Volume (Set, mL): 450 mL, PEEP (cm H2O): 5 cmH2O      Intake/Output Summary (Last 24 hours) at 1/20/2025 1621  Last data filed at 1/20/2025 1500  Gross per 24 hour   Intake 710.32 ml   Output --   Net 710.32 ml       Physical Exam:  Gen: Laying in bed in NAD  HEENT: NC AT  Resp: intubated, B/L air entry, no wheezing or rhonchi  CVS: S1 S2, regular rhythm on tele  Abd: Soft NT ND  Ext: no swelling noted, s/p left BKA  Neuro: sedated, but opens eyes to stimulation, follows some commands    Labs: reviewed    Imaging: reviewed    Past Medical/Surgical history     Family history     Social history     Time Spent on this Encounter "   Supriya was seen and evaluated by me on 01/20/25.  She was in critical condition as the result of acute hypoxic resp failure.    Her condition is now Serious.      The acute issues managed by me today include acute hypoxic respiratory failure, shock, fluid overload, ESRD on HD, fluid overload, Afib RVR, and DM   Supportive interventions provided and/or ordered by me include mechanical ventilation, fluid removal with iHD, amiodarone, IV heparin, and monitoring.     Total Critical Care time spent by me, excluding procedures, was 75 minutes.    Cheo Watt MD

## 2025-01-20 NOTE — CONSULTS
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         has MS    Thyroid Disease Other     Eye Disorder Other         cataracts    Cancer Other         throat/liver    Skin Cancer No family hx of     Melanoma No family hx of     Glaucoma No family hx of     Macular Degeneration No family hx of     Anesthesia Reaction No family hx of           Review of Systems   A comprehensive review of system was performed and is negative other than that noted in the HPI or here.     Physical Exam   Vital Signs with Ranges  Temp:  [97.4  F (36.3  C)-99.5  F (37.5  C)] 98.6  F (37  C)  Pulse:  [] 108  Resp:  [10-41] 18  BP: ()/() 154/66  SpO2:  [88 %-100 %] 97 %  Wt Readings from Last 4 Encounters:   25 99.3 kg (218 lb 14.7 oz)   24 101.1 kg (222 lb 14.2 oz)   24 101.1 kg (222 lb 14.2 oz)   24 101.1 kg  "(222 lb 14.2 oz)     I/O last 3 completed shifts:  In: 446.33 [I.V.:446.33]  Out: -       Vitals: BP (!) 154/66   Pulse 108   Temp 98.6  F (37  C) (Oral)   Resp 18   Ht 1.702 m (5' 7\")   Wt 99.3 kg (218 lb 14.7 oz)   LMP  (LMP Unknown)   SpO2 97%   BMI 34.29 kg/m      Physical Exam:   General: somnolent but responsive during interview  HEENT: moist mucous membranes  CV: Irregularly irregular rhythm.  Tachycardic.  Well-perfused.   Lungs: Distant lung sounds.    No lab results found in last 7 days.    Invalid input(s): \"TROPONINIES\"    Recent Labs   Lab 01/19/25  2053 01/19/25  1724 01/19/25  1335 01/19/25  0617 01/19/25  0514 01/18/25  0615 01/18/25  0455 01/17/25  0423 01/17/25  0237 01/16/25  0819 01/16/25  0617 01/13/25  0557 01/13/25  0514   WBC  --   --   --   --  14.9*  --  7.8  --  9.9   < >  --    < > 4.6   HGB  --   --   --   --  9.3*  --  9.2*  --  9.0*   < >  --    < > 7.8*   MCV  --   --   --   --  98  --  97  --  98   < >  --    < > 94   PLT  --   --   --   --  268  --  280  --  254   < >  --    < > 165   NA  --   --   --   --  133*  --  131*  --  131*  --  130*   < > 135   POTASSIUM  --   --   --   --  4.7  --  4.9  --  4.9  --  5.3   < > 4.7   CHLORIDE  --   --   --   --  92*  --  89*  --  90*  --  89*   < > 94*   CO2  --   --   --   --  28  --  26  --  29  --  27   < > 27   BUN  --   --   --   --  55.3*  --  86.3*  --  51.1*  --  62.8*   < > 59.2*   CR  --   --   --   --  2.90*  --  3.97*  --  2.77*  --  3.48*   < > 5.73*   GFRESTIMATED  --   --   --   --  16*  --  11*  --  17*  --  13*   < > 7*   ANIONGAP  --   --   --   --  13  --  16*  --  12  --  14   < > 14   JODY  --   --   --   --  10.1  --  10.4  --  10.3  --  10.6*   < > 9.7   * 101* 117*   < > 138*   < > 160*   < > 323*   < > 362*   < > 169*   ALBUMIN  --   --   --   --   --   --   --   --   --   --  3.2*  --  2.8*   PROTTOTAL  --   --   --   --   --   --   --   --   --   --  6.6  --   --    BILITOTAL  --   --   --   --   --   " "--   --   --   --   --  0.2  --   --    ALKPHOS  --   --   --   --   --   --   --   --   --   --  95  --   --    ALT  --   --   --   --   --   --   --   --   --   --  164*  --   --    AST  --   --   --   --   --   --   --   --   --   --  90*  --   --     < > = values in this interval not displayed.     Recent Labs   Lab Test 04/21/23  1405 01/17/20  1204   CHOL 113 145   HDL 50 55   LDL 39 74   TRIG 122 78     Recent Labs   Lab 01/19/25  0514 01/18/25  0455 01/17/25  0237   WBC 14.9* 7.8 9.9   HGB 9.3* 9.2* 9.0*   HCT 28.7* 28.3* 28.5*   MCV 98 97 98    280 254   B12 815  --   --      Recent Labs   Lab 01/17/25  1213 01/16/25  0901   PHV 7.41 7.42   PO2V 48* 58*   PCO2V 49 46   HCO3V 31* 29*     No results for input(s): \"NTBNPI\", \"NTBNP\" in the last 168 hours.  No results for input(s): \"DD\" in the last 168 hours.  No results for input(s): \"SED\", \"CRP\" in the last 168 hours.  Recent Labs   Lab 01/19/25  0514 01/18/25 0455 01/17/25  0237    280 254     No results for input(s): \"TSH\" in the last 168 hours.  No results for input(s): \"COLOR\", \"APPEARANCE\", \"URINEGLC\", \"URINEBILI\", \"URINEKETONE\", \"SG\", \"UBLD\", \"URINEPH\", \"PROTEIN\", \"UROBILINOGEN\", \"NITRITE\", \"LEUKEST\", \"RBCU\", \"WBCU\" in the last 168 hours.    Imaging:  No results found for this or any previous visit (from the past 48 hours).    Assessment & Plan     Ms. Supriya Herr is a pleasant 75 year old female who is hospitalized for acute respiratory failure in the setting of influenza A infection. Her medical comorbidities include, but not limited to:    Afib  Paroxysmal, captured during this hospitalization  CHADS-VASc 6 (age, sex, HTN, DM, CHF)  HFpEF  DM II  COPD  JONE on CPAP  Obesity  HTN  ESRD on HD  MDD    Given her classic risk factors, it is unclear whether the patient s atrial fibrillation is new or previously undiagnosed. Nevertheless, considering her comorbidities, a continued course of amiodarone for at least three months seems " reasonable, after which discontinuation can be considered in the outpatient setting to avoid its long-term side effects. Alternatives such as dofetilide and sotalol are not viable due to her end-stage renal disease. Additionally, in heart failure with preserved ejection fraction, a rhythm control strategy may offer improved symptomatic outcomes, though her candidacy for ablation remains uncertain. Until a formal swallow evaluation is completed, IV amiodarone therapy should be maintained.     Regarding anticoagulation, her elevated WU?DS?-VASc score of 6 strongly supports the need for ongoing anticoagulation, so we will continue intravenous heparin until further discussion with her and her daughter can take place.    Kameron Wright MD   1/19/2025

## 2025-01-21 ENCOUNTER — TELEPHONE (OUTPATIENT)
Dept: MULTI SPECIALTY CLINIC | Facility: CLINIC | Age: 75
End: 2025-01-21

## 2025-01-21 ENCOUNTER — APPOINTMENT (OUTPATIENT)
Dept: CARDIOLOGY | Facility: CLINIC | Age: 75
DRG: 207 | End: 2025-01-21
Attending: INTERNAL MEDICINE
Payer: COMMERCIAL

## 2025-01-21 ENCOUNTER — APPOINTMENT (OUTPATIENT)
Dept: GENERAL RADIOLOGY | Facility: CLINIC | Age: 75
DRG: 207 | End: 2025-01-21
Attending: HOSPITALIST
Payer: COMMERCIAL

## 2025-01-21 LAB
ANION GAP SERPL CALCULATED.3IONS-SCNC: 20 MMOL/L (ref 7–15)
BACTERIA BRONCH: NORMAL
BUN SERPL-MCNC: 64.3 MG/DL (ref 8–23)
CALCIUM SERPL-MCNC: 10.1 MG/DL (ref 8.8–10.4)
CD19 CELLS NFR BRONCH: 0 %
CD3 CELLS NFR BRONCH: 83 %
CD3+CD4+ CELLS NFR BRONCH: 20 %
CD3+CD4+ CELLS/CD3+CD8+ CLL BRONCH: 0.34 %
CD3+CD8+ CELLS NFR BRONCH: 58 %
CD3-CD16+CD56+ CELLS NFR SPEC: 14 %
CHLORIDE SERPL-SCNC: 87 MMOL/L (ref 98–107)
CREAT SERPL-MCNC: 3.86 MG/DL (ref 0.51–0.95)
EGFRCR SERPLBLD CKD-EPI 2021: 11 ML/MIN/1.73M2
ERYTHROCYTE [DISTWIDTH] IN BLOOD BY AUTOMATED COUNT: 17.7 % (ref 10–15)
GLUCOSE BLDC GLUCOMTR-MCNC: 173 MG/DL (ref 70–99)
GLUCOSE BLDC GLUCOMTR-MCNC: 185 MG/DL (ref 70–99)
GLUCOSE BLDC GLUCOMTR-MCNC: 185 MG/DL (ref 70–99)
GLUCOSE BLDC GLUCOMTR-MCNC: 217 MG/DL (ref 70–99)
GLUCOSE BLDC GLUCOMTR-MCNC: 241 MG/DL (ref 70–99)
GLUCOSE BLDC GLUCOMTR-MCNC: 242 MG/DL (ref 70–99)
GLUCOSE SERPL-MCNC: 187 MG/DL (ref 70–99)
HCO3 SERPL-SCNC: 22 MMOL/L (ref 22–29)
HCT VFR BLD AUTO: 29.3 % (ref 35–47)
HGB BLD-MCNC: 9.6 G/DL (ref 11.7–15.7)
HOLD SPECIMEN: NORMAL
LVEF ECHO: NORMAL
LYMPHOCYTE SUBSET BRONCHIAL EXTERNAL COMMENT: NORMAL
MCH RBC QN AUTO: 31.8 PG (ref 26.5–33)
MCHC RBC AUTO-ENTMCNC: 32.8 G/DL (ref 31.5–36.5)
MCV RBC AUTO: 97 FL (ref 78–100)
PLATELET # BLD AUTO: 295 10E3/UL (ref 150–450)
POTASSIUM SERPL-SCNC: 4.7 MMOL/L (ref 3.4–5.3)
RBC # BLD AUTO: 3.02 10E6/UL (ref 3.8–5.2)
SODIUM SERPL-SCNC: 129 MMOL/L (ref 135–145)
UFH PPP CHRO-ACNC: 0.16 IU/ML
UFH PPP CHRO-ACNC: 0.33 IU/ML
UFH PPP CHRO-ACNC: 0.34 IU/ML
VANCOMYCIN SERPL-MCNC: 20.4 UG/ML
WBC # BLD AUTO: 19.9 10E3/UL (ref 4–11)

## 2025-01-21 PROCEDURE — 120N000004 HC R&B MS OVERFLOW

## 2025-01-21 PROCEDURE — 200N000001 HC R&B ICU

## 2025-01-21 PROCEDURE — 999N000009 HC STATISTIC AIRWAY CARE

## 2025-01-21 PROCEDURE — 258N000003 HC RX IP 258 OP 636: Performed by: INTERNAL MEDICINE

## 2025-01-21 PROCEDURE — P9047 ALBUMIN (HUMAN), 25%, 50ML: HCPCS | Mod: JZ | Performed by: INTERNAL MEDICINE

## 2025-01-21 PROCEDURE — 94003 VENT MGMT INPAT SUBQ DAY: CPT

## 2025-01-21 PROCEDURE — 90935 HEMODIALYSIS ONE EVALUATION: CPT

## 2025-01-21 PROCEDURE — 94002 VENT MGMT INPAT INIT DAY: CPT

## 2025-01-21 PROCEDURE — 255N000002 HC RX 255 OP 636: Performed by: INTERNAL MEDICINE

## 2025-01-21 PROCEDURE — 250N000011 HC RX IP 250 OP 636: Mod: JZ | Performed by: INTERNAL MEDICINE

## 2025-01-21 PROCEDURE — 999N000157 HC STATISTIC RCP TIME EA 10 MIN

## 2025-01-21 PROCEDURE — 93306 TTE W/DOPPLER COMPLETE: CPT | Mod: 26 | Performed by: INTERNAL MEDICINE

## 2025-01-21 PROCEDURE — 250N000009 HC RX 250: Performed by: STUDENT IN AN ORGANIZED HEALTH CARE EDUCATION/TRAINING PROGRAM

## 2025-01-21 PROCEDURE — 250N000013 HC RX MED GY IP 250 OP 250 PS 637: Performed by: STUDENT IN AN ORGANIZED HEALTH CARE EDUCATION/TRAINING PROGRAM

## 2025-01-21 PROCEDURE — 258N000003 HC RX IP 258 OP 636

## 2025-01-21 PROCEDURE — 250N000009 HC RX 250: Performed by: INTERNAL MEDICINE

## 2025-01-21 PROCEDURE — 85520 HEPARIN ASSAY: CPT | Performed by: INTERNAL MEDICINE

## 2025-01-21 PROCEDURE — 80048 BASIC METABOLIC PNL TOTAL CA: CPT | Performed by: STUDENT IN AN ORGANIZED HEALTH CARE EDUCATION/TRAINING PROGRAM

## 2025-01-21 PROCEDURE — 250N000011 HC RX IP 250 OP 636

## 2025-01-21 PROCEDURE — 250N000011 HC RX IP 250 OP 636: Performed by: STUDENT IN AN ORGANIZED HEALTH CARE EDUCATION/TRAINING PROGRAM

## 2025-01-21 PROCEDURE — 250N000013 HC RX MED GY IP 250 OP 250 PS 637: Performed by: INTERNAL MEDICINE

## 2025-01-21 PROCEDURE — 85014 HEMATOCRIT: CPT | Performed by: STUDENT IN AN ORGANIZED HEALTH CARE EDUCATION/TRAINING PROGRAM

## 2025-01-21 PROCEDURE — 99231 SBSQ HOSP IP/OBS SF/LOW 25: CPT | Performed by: INTERNAL MEDICINE

## 2025-01-21 PROCEDURE — C8929 TTE W OR WO FOL WCON,DOPPLER: HCPCS

## 2025-01-21 PROCEDURE — 250N000011 HC RX IP 250 OP 636: Performed by: INTERNAL MEDICINE

## 2025-01-21 PROCEDURE — 90935 HEMODIALYSIS ONE EVALUATION: CPT | Performed by: INTERNAL MEDICINE

## 2025-01-21 PROCEDURE — 94640 AIRWAY INHALATION TREATMENT: CPT | Mod: 76

## 2025-01-21 PROCEDURE — 94640 AIRWAY INHALATION TREATMENT: CPT

## 2025-01-21 PROCEDURE — 99291 CRITICAL CARE FIRST HOUR: CPT | Mod: GC | Performed by: INTERNAL MEDICINE

## 2025-01-21 PROCEDURE — 80202 ASSAY OF VANCOMYCIN: CPT | Performed by: INTERNAL MEDICINE

## 2025-01-21 PROCEDURE — 71045 X-RAY EXAM CHEST 1 VIEW: CPT

## 2025-01-21 RX ORDER — CEFEPIME HYDROCHLORIDE 1 G/1
1 INJECTION, POWDER, FOR SOLUTION INTRAMUSCULAR; INTRAVENOUS EVERY 24 HOURS
Status: DISCONTINUED | OUTPATIENT
Start: 2025-01-21 | End: 2025-01-24

## 2025-01-21 RX ORDER — ALBUMIN (HUMAN) 12.5 G/50ML
12.5 SOLUTION INTRAVENOUS ONCE
Status: COMPLETED | OUTPATIENT
Start: 2025-01-21 | End: 2025-01-21

## 2025-01-21 RX ORDER — CEFEPIME HYDROCHLORIDE 2 G/1
2 INJECTION, POWDER, FOR SOLUTION INTRAVENOUS EVERY 24 HOURS
Status: DISCONTINUED | OUTPATIENT
Start: 2025-01-21 | End: 2025-01-21

## 2025-01-21 RX ORDER — CHLORHEXIDINE GLUCONATE ORAL RINSE 1.2 MG/ML
15 SOLUTION DENTAL 2 TIMES DAILY
Status: DISCONTINUED | OUTPATIENT
Start: 2025-01-21 | End: 2025-01-25

## 2025-01-21 RX ORDER — VANCOMYCIN HYDROCHLORIDE 500 MG/10ML
500 INJECTION, POWDER, LYOPHILIZED, FOR SOLUTION INTRAVENOUS ONCE
Status: COMPLETED | OUTPATIENT
Start: 2025-01-21 | End: 2025-01-21

## 2025-01-21 RX ADMIN — INSULIN ASPART 5 UNITS: 100 INJECTION, SOLUTION INTRAVENOUS; SUBCUTANEOUS at 20:34

## 2025-01-21 RX ADMIN — AMIODARONE HYDROCHLORIDE 0.5 MG/MIN: 1.8 INJECTION, SOLUTION INTRAVENOUS at 20:40

## 2025-01-21 RX ADMIN — BUDESONIDE 1 MG: 1 INHALANT ORAL at 07:17

## 2025-01-21 RX ADMIN — Medication: at 11:37

## 2025-01-21 RX ADMIN — VANCOMYCIN HYDROCHLORIDE 500 MG: 500 INJECTION, POWDER, LYOPHILIZED, FOR SOLUTION INTRAVENOUS at 22:45

## 2025-01-21 RX ADMIN — CEFEPIME 1 G: 1 INJECTION, POWDER, FOR SOLUTION INTRAMUSCULAR; INTRAVENOUS at 22:34

## 2025-01-21 RX ADMIN — FENTANYL CITRATE 50 MCG: 50 INJECTION, SOLUTION INTRAMUSCULAR; INTRAVENOUS at 23:38

## 2025-01-21 RX ADMIN — BUDESONIDE 1 MG: 1 INHALANT ORAL at 19:18

## 2025-01-21 RX ADMIN — CHLORHEXIDINE GLUCONATE 15 ML: 1.2 RINSE ORAL at 20:34

## 2025-01-21 RX ADMIN — DEXMEDETOMIDINE HYDROCHLORIDE 0.3 MCG/KG/HR: 400 INJECTION INTRAVENOUS at 06:42

## 2025-01-21 RX ADMIN — AMIODARONE HYDROCHLORIDE 1 MG/MIN: 1.8 INJECTION, SOLUTION INTRAVENOUS at 13:44

## 2025-01-21 RX ADMIN — FENTANYL CITRATE 50 MCG: 50 INJECTION, SOLUTION INTRAMUSCULAR; INTRAVENOUS at 20:29

## 2025-01-21 RX ADMIN — Medication 5 ML: at 14:19

## 2025-01-21 RX ADMIN — INSULIN ASPART 5 UNITS: 100 INJECTION, SOLUTION INTRAVENOUS; SUBCUTANEOUS at 23:33

## 2025-01-21 RX ADMIN — INSULIN ASPART 2 UNITS: 100 INJECTION, SOLUTION INTRAVENOUS; SUBCUTANEOUS at 11:55

## 2025-01-21 RX ADMIN — INSULIN ASPART 2 UNITS: 100 INJECTION, SOLUTION INTRAVENOUS; SUBCUTANEOUS at 05:08

## 2025-01-21 RX ADMIN — MICONAZOLE NITRATE: 2 POWDER TOPICAL at 21:25

## 2025-01-21 RX ADMIN — AMIODARONE HYDROCHLORIDE 0.5 MG/MIN: 1.8 INJECTION, SOLUTION INTRAVENOUS at 02:49

## 2025-01-21 RX ADMIN — ALBUMIN HUMAN 12.5 G: 0.25 SOLUTION INTRAVENOUS at 12:44

## 2025-01-21 RX ADMIN — HEPARIN SODIUM 1400 UNITS/HR: 10000 INJECTION, SOLUTION INTRAVENOUS at 15:52

## 2025-01-21 RX ADMIN — CHLORHEXIDINE GLUCONATE 15 ML: 1.2 RINSE ORAL at 10:55

## 2025-01-21 RX ADMIN — PERFLUTREN 2 ML: 6.52 INJECTION, SUSPENSION INTRAVENOUS at 14:43

## 2025-01-21 RX ADMIN — SODIUM CHLORIDE 500 ML: 9 INJECTION, SOLUTION INTRAVENOUS at 11:36

## 2025-01-21 RX ADMIN — NOREPINEPHRINE BITARTRATE 0.08 MCG/KG/MIN: 0.02 INJECTION, SOLUTION INTRAVENOUS at 14:00

## 2025-01-21 RX ADMIN — Medication 40 MG: at 08:36

## 2025-01-21 RX ADMIN — Medication 50 MCG: at 08:36

## 2025-01-21 RX ADMIN — IPRATROPIUM BROMIDE AND ALBUTEROL SULFATE 3 ML: .5; 3 SOLUTION RESPIRATORY (INHALATION) at 11:25

## 2025-01-21 RX ADMIN — TACROLIMUS: 1 OINTMENT TOPICAL at 09:58

## 2025-01-21 RX ADMIN — INSULIN ASPART 4 UNITS: 100 INJECTION, SOLUTION INTRAVENOUS; SUBCUTANEOUS at 17:11

## 2025-01-21 RX ADMIN — INSULIN ASPART 2 UNITS: 100 INJECTION, SOLUTION INTRAVENOUS; SUBCUTANEOUS at 08:36

## 2025-01-21 RX ADMIN — IPRATROPIUM BROMIDE AND ALBUTEROL SULFATE 3 ML: .5; 3 SOLUTION RESPIRATORY (INHALATION) at 19:18

## 2025-01-21 RX ADMIN — ACETAMINOPHEN 650 MG: 325 TABLET, FILM COATED ORAL at 09:53

## 2025-01-21 RX ADMIN — DEXMEDETOMIDINE HYDROCHLORIDE 0.4 MCG/KG/HR: 400 INJECTION INTRAVENOUS at 23:12

## 2025-01-21 RX ADMIN — TACROLIMUS: 1 OINTMENT TOPICAL at 20:35

## 2025-01-21 RX ADMIN — ATORVASTATIN CALCIUM 20 MG: 10 TABLET, FILM COATED ORAL at 20:34

## 2025-01-21 RX ADMIN — IPRATROPIUM BROMIDE AND ALBUTEROL SULFATE 3 ML: .5; 3 SOLUTION RESPIRATORY (INHALATION) at 07:17

## 2025-01-21 RX ADMIN — VANCOMYCIN HYDROCHLORIDE 2000 MG: 10 INJECTION, POWDER, LYOPHILIZED, FOR SOLUTION INTRAVENOUS at 05:54

## 2025-01-21 RX ADMIN — SEVELAMER CARBONATE 800 MG: 800 TABLET, FILM COATED ORAL at 08:36

## 2025-01-21 RX ADMIN — SERTRALINE HYDROCHLORIDE 75 MG: 50 TABLET ORAL at 20:34

## 2025-01-21 RX ADMIN — MICONAZOLE NITRATE: 2 POWDER TOPICAL at 11:48

## 2025-01-21 RX ADMIN — AMIODARONE HYDROCHLORIDE 400 MG: 200 TABLET ORAL at 11:48

## 2025-01-21 RX ADMIN — IPRATROPIUM BROMIDE AND ALBUTEROL SULFATE 3 ML: .5; 3 SOLUTION RESPIRATORY (INHALATION) at 14:41

## 2025-01-21 RX ADMIN — METHYLPREDNISOLONE SODIUM SUCCINATE 40 MG: 40 INJECTION, POWDER, FOR SOLUTION INTRAMUSCULAR; INTRAVENOUS at 05:51

## 2025-01-21 RX ADMIN — CETIRIZINE HYDROCHLORIDE 5 MG: 5 TABLET ORAL at 21:25

## 2025-01-21 RX ADMIN — CEFEPIME 1 G: 1 INJECTION, POWDER, FOR SOLUTION INTRAMUSCULAR; INTRAVENOUS at 05:17

## 2025-01-21 ASSESSMENT — ACTIVITIES OF DAILY LIVING (ADL)
ADLS_ACUITY_SCORE: 89
ADLS_ACUITY_SCORE: 93
ADLS_ACUITY_SCORE: 89

## 2025-01-21 NOTE — PROGRESS NOTES
[facility] ESTABLISHED PATIENT CARDIOLOGY PROGRESS NOTE    Patient Name: Supriya Herr  Age:75 year old   Sex: female   Admission Date: 1/8/2025      SUBJECTIVE:   Influenza A pneumonia with respiratory failure and sepsis; currently treated with low-dose IV norepinephrine with heart rate 100 bpm.  On IV amiodarone for rate control.  Anticoagulated with IV heparin.      PERTINENT INTERVAL EVENTS:   1/8/2025: Admitted with influenza A pneumonia with respiratory failure; intubated.  1/15/2025: Developed atrial fibrillation RVR.  1/18/2025: Extubated.  1/20/2025: Reintubated.      CURRENT MEDICATIONS:     Current Facility-Administered Medications   Medication Dose Route Frequency Provider Last Rate Last Admin    - MEDICATION INSTRUCTIONS for Dialysis Patients -   Does not apply See Admin Instructions Dusty Prieto MD        acetaminophen (TYLENOL) tablet 650 mg  650 mg Oral Q4H PRMarcell Badillo PA-C   650 mg at 01/21/25 0953    Or    acetaminophen (TYLENOL) Suppository 650 mg  650 mg Rectal Q4H PRMarcell Badillo PA-C        albuterol (PROVENTIL HFA/VENTOLIN HFA) inhaler  2 puff Inhalation Q6H PRN Marcell Grubbs PA-C   2 puff at 01/09/25 0021    amiodarone (CORDARONE) suspension 400 mg  400 mg Oral or FT or NG tube BID Truman Moreland MD        Followed by    [START ON 1/25/2025] amiodarone (CORDARONE) suspension 200 mg  200 mg Oral or FT or NG tube BID Truman Moreland MD        Followed by    [START ON 1/27/2025] amiodarone (CORDARONE) suspension 200 mg  200 mg Oral or FT or NG tube Daily Truman Moreland MD        atorvastatin (LIPITOR) tablet 20 mg  20 mg Oral or Feeding Tube QPM Kamryn Peguero MD   20 mg at 01/20/25 2052    B and C vitamin Complex with folic acid (NEPHRONEX) liquid 5 mL  5 mL Per Feeding Tube Daily Truman Moreland MD   5 mL at 01/20/25 1552    benzonatate (TESSALON) capsule 100 mg  100 mg Oral TID PRN Marcell Grubbs PA-C   100 mg at 01/08/25 4188    budesonide  (PULMICORT) neb solution 1 mg  1 mg Nebulization BID Cheo Watt MD   1 mg at 01/21/25 0717    calcium carbonate (TUMS) chewable tablet 1,000 mg  1,000 mg Oral 4x Daily PRN Marcell Grubbs PA-C        carboxymethylcellulose PF (REFRESH PLUS) 0.5 % ophthalmic solution 1 drop  1 drop Both Eyes Q1H PRN Ted Proctor APRN CNP        [Held by provider] carvedilol (COREG) tablet 25 mg  25 mg Oral BID w/meals Ann Mitchell MD        ceFEPIme (MAXIPIME) 1 g vial to attach to  mL bag for ADULTS or NS 50 mL bag for PEDS  1 g Intravenous Q24H Magalie Kaba RPH   1 g at 01/21/25 0517    cetirizine (zyrTEC) tablet 5 mg  5 mg Oral or Feeding Tube At Bedtime Kamryn Peguero MD   5 mg at 01/20/25 2110    chlorhexidine (PERIDEX) 0.12 % solution 15 mL  15 mL Swish & Spit BID Truman Moreland MD        dexmedeTOMIDine (PRECEDEX) 4 mcg/mL in sodium chloride 0.9 % 100 mL infusion  0.1-1.2 mcg/kg/hr Intravenous Continuous Cheo Watt MD 7.5 mL/hr at 01/21/25 0642 0.3 mcg/kg/hr at 01/21/25 0642    dextrose 10% infusion   Intravenous Continuous PRN Truman Moreland MD        glucose gel 15-30 g  15-30 g Oral Q15 Min PRN Yuliet Navarro MD        Or    dextrose 50 % injection 25-50 mL  25-50 mL Intravenous Q15 Min PRN Yuliet Navarro MD        Or    glucagon injection 1 mg  1 mg Subcutaneous Q15 Min PRN Yuliet Navarro MD        fentaNYL (PF) (SUBLIMAZE) injection 25-50 mcg  25-50 mcg Intravenous Q1H PRN hCeo Watt MD   50 mcg at 01/19/25 0104    [Held by provider] furosemide (LASIX) tablet 80 mg  80 mg Oral or NG Tube BID Cheo Watt MD   80 mg at 01/18/25 1623    [Held by provider] gabapentin (NEURONTIN) solution 100 mg  100 mg Oral or Feeding Tube Daily Kamryn Peguero MD   100 mg at 01/18/25 1007    heparin 25,000 units in 0.45% NaCl 250 mL ANTICOAGULANT infusion  0-5,000 Units/hr Intravenous Continuous Cheo Watt MD 14 mL/hr at 01/21/25 0105 1,400 Units/hr at  01/21/25 0105    insulin aspart (NovoLOG) injection (RAPID ACTING)  1-12 Units Subcutaneous Q4H Ravin King MD   2 Units at 01/21/25 0836    [Held by provider] insulin glargine (LANTUS PEN) injection 20 Units  20 Units Subcutaneous BID Ravin King MD   20 Units at 01/19/25 1336    ipratropium - albuterol 0.5 mg/2.5 mg/3 mL (DUONEB) neb solution 3 mL  3 mL Nebulization 4x daily Marcell Grubbs PA-C   3 mL at 01/21/25 0717    lidocaine (LMX4) cream   Topical Q1H PRN Marcell Grubbs PA-C        lidocaine 1 % 0.1-1 mL  0.1-1 mL Other Q1H PRN Marcell Grubbs PA-C        lidocaine 1 % 0.5 mL  0.5 mL Intradermal Once PRN EVELIA Hawley MD        lidocaine 1 % 0.5 mL  0.5 mL Intradermal Once PRN EVELIA Hawley MD        melatonin tablet 1 mg  1 mg Oral At Bedtime PRN Marcell Grubbs PA-C        methylPREDNISolone sodium succinate (SOLU-MEDROL) injection 40 mg  40 mg Intravenous Q24H Ravin King MD   40 mg at 01/21/25 0551    metoprolol (LOPRESSOR) injection 2.5-5 mg  2.5-5 mg Intravenous Q6H PRN Erick Zeng MD   5 mg at 01/20/25 1123    miconazole (MICATIN) 2 % powder   Topical BID Truman Moreland MD        naloxone (NARCAN) injection 0.2 mg  0.2 mg Intravenous Q2 Min PRN Cheo Watt MD        Or    naloxone (NARCAN) injection 0.4 mg  0.4 mg Intravenous Q2 Min PRN Cheo Watt MD        Or    naloxone (NARCAN) injection 0.2 mg  0.2 mg Intramuscular Q2 Min PRN Cheo Watt MD        Or    naloxone (NARCAN) injection 0.4 mg  0.4 mg Intramuscular Q2 Min PRN Cheo Watt MD        No heparin via hemodialysis machine   Does not apply Once EVELIA Hawley MD        No lozenges or gum should be given while patient on BIPAP/AVAPS/AVAPS AE   Does not apply Continuous PRN Ted Proctor APRN CNP        norepinephrine (LEVOPHED) 4 mg in  mL infusion PREMIX  0.01-0.6 mcg/kg/min Intravenous Continuous Truman Moreland MD 5.6 mL/hr at 01/20/25 2025 0.015 mcg/kg/min at 01/20/25 2025     ondansetron (ZOFRAN ODT) ODT tab 4 mg  4 mg Oral Q6H PRN Marcell Grubbs PA-C        Or    ondansetron (ZOFRAN) injection 4 mg  4 mg Intravenous Q6H PRN Marcell Grubbs PA-C        pantoprazole (PROTONIX) 2 mg/mL suspension 40 mg  40 mg Per Feeding Tube [facility] Truman Moreland MD   40 mg at 01/21/25 0836    Or    pantoprazole (PROTONIX) IV push injection 40 mg  40 mg Intravenous [facility] Truman Moreland MD   40 mg at 01/19/25 0849    Patient may continue current oral medications   Does not apply Continuous PRN Ted Proctor APRN CNP        senna-docusate (SENOKOT-S/PERICOLACE) 8.6-50 MG per tablet 1 tablet  1 tablet Oral BID PRN Marcell Grubbs PA-C   1 tablet at 01/13/25 1412    Or    senna-docusate (SENOKOT-S/PERICOLACE) 8.6-50 MG per tablet 2 tablet  2 tablet Oral BID PRN Marcell Grubbs PA-C   2 tablet at 01/14/25 0750    sertraline (ZOLOFT) tablet 75 mg  75 mg Oral or Feeding Tube QPM Kamryn Peguero MD   75 mg at 01/20/25 2052    sevelamer carbonate (RENVELA) tablet 800 mg  800 mg Oral or Feeding Tube TID w/meals Kamryn Peguero MD   800 mg at 01/21/25 0836    sodium chloride (PF) 0.9% PF flush 3 mL  3 mL Intracatheter Q8H Marcell Grubbs PA-C   3 mL at 01/21/25 0518    sodium chloride (PF) 0.9% PF flush 3 mL  3 mL Intracatheter q1 min prn Marcell Grubbs PA-C        sodium chloride 0.9% BOLUS 100-150 mL  100-150 mL Intravenous Q15 Min PRN EVELIA Hawley MD        sodium chloride 0.9% BOLUS 500 mL  500 mL Hemodialysis Machine Once EVELIA Hawley MD        Stop Heparin 60 minutes before end of treatment   Does not apply Continuous PRN EVELIA Hawley MD        tacrolimus (PROTOPIC) 0.1 % ointment   Topical BID Marcell Grubbs PA-C   Given at 01/21/25 0958    vancomycin place cardoso - receiving intermittent dosing  1 each Intravenous See Admin Instructions Magalie Kaba, [facility]        vitamin D3 (CHOLECALCIFEROL) tablet 50 mcg  50 mcg Oral or Feeding Tube Daily Sudhir  "Kamryn Ojeda MD   50 mcg at 25 0836         ALLERGIES:     Allergies   Allergen Reactions    Ampicillin-Sulbactam Sodium Rash     No evidence SJS, but very uncomfortable and precipitated multiple provider visits. Would not use penicillins again if other options available.     Penicillins Rash         PHYSICAL EXAM:   Vitals were reviewed  Blood pressure 118/46, pulse 87, temperature 98  F (36.7  C), temperature source Axillary, resp. rate 16, height 1.702 m (5' 7\"), weight 96 kg (211 lb 10.3 oz), SpO2 100%, not currently breastfeeding.  Temperatures:  Current - Temp: 98.4  F (36.9  C); Max - Temp  Av.2  F (36.8  C)  Min: 97.4  F (36.3  C)  Max: 98.6  F (37  C)  Respiration range: Resp  Av.5  Min: 6  Max: 44  Pulse range: Pulse  Av.3  Min: 89  Max: 141  Blood pressure range: Systolic (24hrs), Av , Min:98 , Max:173   ; Diastolic (24hrs), Av, Min:46, Max:114    Pulse oximetry range: SpO2  Av.3 %  Min: 83 %  Max: 99 %    Intake/Output Summary (Last 24 hours) at 2025 1341  Last data filed at 2025 1200  Gross per 24 hour   Intake 698.87 ml   Output --   Net 698.87 ml       GENERAL: Intubated and ventilated, sedated.    HEENT: No scleral icterus or conjunctival hemorrhage.    NECK: Supple without thyromegaly. No carotid bruits are present.  JVP difficult to assess.    CARDIOVASCULAR: Irregular tachycardic, no audible murmurs.      RESPIRATORY: Coarse breath sounds.      GASTROINTESTINAL: Soft, non-tender, normal bowel sounds.    PULSES: Grossly normal and symmetric lower extremity pulses.    EXTREMITIES: No clubbing or cyanosis. No edema.    SKIN: Warm and dry. No obvious rashes.    NEURO: Sedated.        LABS AND DIAGNOSTICS:  Labs personally reviewed by myself    CARDIAC ENZYMES:  No lab results found in last 7 days.  Recent Labs   Lab 25  0830   NTBNPI 49,524*       GENERAL:  Recent Labs   Lab 25  0839 25  0545 25  0508 25  1295 " "01/20/25  1845 01/20/25  1541 01/20/25  0804 01/20/25  0539 01/20/25  0505 01/19/25  2319 01/16/25  0819 01/16/25  0617   WBC  --  19.9*  --   --  20.7*  --   --  14.1*  --   --    < >  --    HGB  --  9.6*  --   --  10.7*  --   --  10.2*  --   --    < >  --    MCV  --  97  --   --  98  --   --  99  --   --    < >  --    PLT  --  295  --   --  325  --   --  350  --   --    < >  --    NA  --  129*  --   --   --   --   --  134*  --  133*   < > 130*   POTASSIUM  --  4.7  --   --   --   --   --  5.0  --  4.6   < > 5.3   CHLORIDE  --  87*  --   --   --   --   --  92*  --  93*   < > 89*   CO2  --  22  --   --   --   --   --  23  --  26   < > 27   BUN  --  64.3*  --   --   --   --   --  77.0*  --  72.6*   < > 62.8*   CR  --  3.86*  --   --   --   --   --  4.27*  --  4.05*   < > 3.48*   GFRESTIMATED  --  11*  --   --   --   --   --  10*  --  11*   < > 13*   ANIONGAP  --  20*  --   --   --   --   --  19*  --  14   < > 14   JODY  --  10.1  --   --   --   --   --  10.7*  --  10.2   < > 10.6*   * 187* 185*   < >  --   --    < > 184*   < > 123*   < > 362*   ALBUMIN  --   --   --   --   --   --   --   --   --   --   --  3.2*   PROTTOTAL  --   --   --   --   --  6.3*  --   --   --   --   --  6.6   BILITOTAL  --   --   --   --   --   --   --   --   --   --   --  0.2   ALKPHOS  --   --   --   --   --   --   --   --   --   --   --  95   ALT  --   --   --   --   --   --   --   --   --   --   --  164*   AST  --   --   --   --   --   --   --   --   --   --   --  90*    < > = values in this interval not displayed.       No results for input(s): \"SED\", \"CRP\" in the last 168 hours.    No results for input(s): \"DD\" in the last 168 hours.    No results for input(s): \"TSH\" in the last 168 hours.    LIPIDS:  Recent Labs   Lab Test 04/21/23  1405 01/17/20  1204   CHOL 113 145   HDL 50 55   LDL 39 74   TRIG 122 78       BLOOD GASES:  Recent Labs   Lab 01/20/25  1541 01/20/25  0830 01/17/25  1213   PHV 7.31* 7.24* 7.41   PO2V 54* 57* 48* "   PCO2V 45 62* 49   HCO3V 23 27 31*         ECG (personally reviewed):  1/20/2025: Atrial fibrillation with rapid ventricular response, nonspecific ST abnormality.    Telemetry (personally reviewed):  Atrial fibrillation with rapid ventricular response.    Right thoracentesis 1/20/2025:  0.1 liters of gelatinous clear yellow fluid were removed and sent to lab. No additional fluid could be removed.     Chest X-ray (personally reviewed) 1/20/2025:  IMPRESSION: Endotracheal and enteric tubes have been removed. Remainder unchanged. Small bilateral pleural effusions with passive atelectasis in the lung bases. Calcified mitral annulus. Moderate degenerative change thoracic spine. Vascular stent left   axilla.     Echocardiogram: Pending.  Reordered today.    Echocardiogram 11/24/2021:  1. Left ventricular systolic function is normal. The visual ejection fraction  is 60-65%.  2. No regional wall motion abnormalities noted.  3. The right ventricle is normal in structure, function and size.  4. The left atrium is moderately dilated.  5. No evidence for significant valvular pathology.      Imaging (past 48 hours):  Recent Results (from the past 48 hours)   XR Chest Port 1 View    Narrative    EXAM: XR CHEST PORT 1 VIEW  LOCATION: [facility]  DATE: 1/20/2025    INDICATION: Increase work of breathing  COMPARISON: 01/14/2025       Impression    IMPRESSION: Endotracheal and enteric tubes have been removed. Remainder unchanged. Small bilateral pleural effusions with passive atelectasis in the lung bases. Calcified mitral annulus. Moderate degenerative change thoracic spine. Vascular stent left   axilla.    CT Chest w/o Contrast    Narrative    EXAM: CT CHEST W/O CONTRAST  LOCATION: [facility]  DATE: 1/20/2025    INDICATION: r o new R plerual effusion  Acute resp failure, Inf A extubated 1 18.  COMPARISON: CT on 1/16/2024 and chest x-ray on 1/20/2025  TECHNIQUE: CT chest without  IV contrast. Multiplanar reformats were obtained. Dose reduction techniques were used.  CONTRAST: None.    FINDINGS:   LUNGS AND PLEURA: Moderate right and small-to-moderate left pleural effusion and bibasilar compressive atelectasis, stable since 1/16/2025. Adjacent compressive atelectasis is slightly increased in the right lower lobe. Increased linear interlobular   septal thickening throughout the lungs, likely due to mild pulmonary edema. The central tracheobronchial tree is clear.    MEDIASTINUM/AXILLAE: Mildly enlarged mediastinal lymph nodes are stable. For example, a 12 mm subcarinal lymph node (4/67) is unchanged. The NG tube has been removed. Mild cardiomegaly. Mildly enlarged main pulmonary artery consistent with pulmonary   hypertension. No thoracic aortic aneurysm. No pericardial effusion.    CORONARY ARTERY CALCIFICATION: Moderate to severe    UPPER ABDOMEN: Pancreatic parenchymal atrophy.    MUSCULOSKELETAL: No suspicious lesions in the bones.      Impression    IMPRESSION:   1.  Stable moderate right and small-to-moderate left pleural effusion. Right lower lobe compressive atelectasis has slightly increased.  2.  Mild pulmonary edema is new/increased.  3.  Stable cardiomegaly. Stable enlarged main pulmonary artery consistent with pulmonary hypertension.     CT Head w/o Contrast    Narrative    EXAM: CT HEAD W/O CONTRAST  LOCATION: [facility]  DATE: 1/20/2025    INDICATION: encephalopathy, r o bleed (on Heparin)  COMPARISON: None.  TECHNIQUE: Routine CT Head without IV contrast. Multiplanar reformats. Dose reduction techniques were used.    FINDINGS:  INTRACRANIAL CONTENTS: No intracranial hemorrhage, extraaxial collection, or mass effect.  No CT evidence of acute infarct. Mild presumed chronic small vessel ischemic changes. Mild generalized volume loss. No hydrocephalus.     VISUALIZED ORBITS/SINUSES/MASTOIDS: Prior bilateral cataract surgery. Visualized portions of the  orbits are otherwise unremarkable. There is mucosal thickening and air-fluid level within the right maxillary sinus. Scattered mucosal thickening of the   ethmoid air cells and sphenoid sinuses. Scattered fluid/membrane thickening in the right mastoid air cells. No apparent mass in the posterior nasopharynx or skull base.    BONES/SOFT TISSUES: No acute abnormality. There is atherosclerotic plaque of the bilateral carotid arteries and vertebral arteries. Right temporomandibular joint osteoarthritis.      Impression    IMPRESSION:  1.  No evidence of intracranial hemorrhage or other acute intracranial abnormality.  2.  Mild cerebral volume loss and presumed changes of chronic microvascular disease.  3.  Mucosal thickening and fluid level within the right maxillary sinus. Correlate for acute sinusitis.   US Thoracentesis    Narrative    EXAM:   1. RIGHT  THORACENTESIS  2. ULTRASOUND GUIDANCE  LOCATION: [facility]  DATE: 1/20/2025    INDICATION: Pleural effusion.    PROCEDURE: Informed consent obtained by telephone from the patient's daughter, Caroline Gray. Time out performed. The chest was prepped and draped in sterile fashion. 10 mL of 1 % lidocaine was infused into the local soft tissues. Under direct ultrasound   guidance, a 5 [place] catheter system was placed into the pleural effusion.     0.1  liters of gelatinous clear yellow  fluid were removed and sent to lab. No additional fluid could be removed.    Patient tolerated procedure well.    Ultrasound imaging was obtained and placed in the patient's permanent medical record.      Impression    IMPRESSION:  Status post right  ultrasound-guided thoracentesis.    Reference CPT Code: 66556           IMPRESSION AND PLAN:     Patient Active Problem List   Diagnosis    Anemia    Vitiligo    Traumatic amputation of leg above knee (H)    Contact dermatitis and other eczema, due to unspecified cause    Dermatophytosis of nail    Generalized  osteoarthrosis, unspecified site    Hypertension goal BP (blood pressure) < 140/90    Moderate nonproliferative diabetic retinopathy associated with type 2 diabetes mellitus (H)    Proteinuria    Stage I pressure ulcer    Hyperlipidemia LDL goal <100    Non compliance with medical treatment    Incontinence of urine    Basal cell carcinoma    Senile nuclear sclerosis    JONE (obstructive sleep apnea)    CHF (congestive heart failure) (H)    Type 2 diabetes mellitus with diabetic chronic kidney disease (H)    Moderate recurrent major depression (H)    Type 2 diabetes mellitus with diabetic neuropathy, with long-term current use of insulin (H)    Macular cyst, hole, or pseudohole of retina    Traumatic amputation of left lower extremity above knee, subsequent encounter    Former tobacco use    Type 2 diabetes mellitus (H)    Dyslipidemia    Anemia in chronic kidney disease    CKD (chronic kidney disease) stage 5, GFR less than 15 ml/min (H)    Obesity (BMI 30-39.9)    Anxiety and depression    Cervical cancer screening    Diabetic foot infection (H)    Plantar ulcer of right foot with fat layer exposed (H)    Cellulitis    Intertrigo    Drug rash    Wheelchair bound    Combined forms of age-related cataract of right eye    Pseudophakia of left eye    Anemia, iron deficiency    Chronic kidney disease, stage 5, kidney failure (H)    Encounter regarding vascular access for dialysis for ESRD (H)    Postoperative nausea    PVD (peripheral vascular disease)    ESRD on dialysis (H)    Encounter regarding vascular access for dialysis for end-stage renal disease (H)    Encounter for adjustment and management of vascular access device    ESRD (end stage renal disease) (H)    Steal syndrome as complication of dialysis access    Nausea    Infection due to 2019 novel coronavirus    Pneumonia due to COVID-19 virus    Hyperkalemia    ESRD (end stage renal disease) on dialysis (H)    Pneumonia of right lower lobe due to infectious  organism    Hypervolemia, unspecified hypervolemia type    Major depressive disorder, recurrent    Class 2 severe obesity due to excess calories with serious comorbidity in adult (H)    Influenza A    Elevated troponin    Abnormal chest x-ray    Acute and chronic respiratory failure with hypoxia (H)    Anemia, unspecified type    Acute metabolic encephalopathy    Severe sepsis with acute organ dysfunction (H)    Pleural effusion       Supriya Herr is a 75 year old female with past medical history pertinent for chronic HFpEF, end-stage renal disease on hemodialysis, COPD, type 2 diabetes mellitus, hypertension, dyslipidemia, obstructive sleep apnea (noncompliant with CPAP), diabetic neuropathy, anemia of chronic disease (baseline creatinine 9-10), right foot ulcer, coccyx pressure ulcer, depression, and multiple other noncardiac comorbidities who was admitted on 1/8/2025 with acute hypoxic respiratory failure secondary to influenza A pneumonia.  On 1/15/2025 she developed atrial fibrillation.     Atrial fibrillation, new diagnosis:  A.  Atrial fibrillation with rapid ventricular response; underlying clinical context of severe influenza A pneumonia and sepsis; multiple underlying comorbidities including obesity, type 2 diabetes mellitus, hypertension, obstructive sleep apnea (noncompliant with CPAP), and end-stage renal disease on hemodialysis.  B.  Long-term recommend rate control strategy.  Okay to use IV amiodarone drip for temporary rate control while on IV norepinephrine; once oral medications can be given and she is no longer on IV norepinephrine, recommend restarting carvedilol and titrating back up to home dose of 25 mg twice daily.   C.  Term DOAC, apixaban 5 mg twice daily.  Okay to use IV heparin for now; switch to DOAC prior to discharge.  D.  Echocardiogram still pending.  Ordered today.    Chronic HFpEF:  A.  Volume regulation via hemodialysis -defer to nephrology service.  Also takes furosemide 80  mg twice daily on nondialysis days.    Influenza A pneumonia with sepsis:  A.  Acute hypoxic respiratory failure, prolonged intubation from 1/8/2025 - 1/18/2025, now reintubated 1/20/2025.  Management per ICU staff.  She has been treated with Tamiflu and methylprednisolone.    I will review the echocardiogram once the result is available.  If no significant change, then cardiology service will sign off at this time.  Please call with questions or concerns.  Recommend using IV amiodarone for temporary rate control while patient intubated/NPO/NE, Lance will switch to oral carvedilol as outlined above.  Thank you very much for asking me to participate in the care of your patient Supriya Herr. Please do not hesitate to contact me if you have questions, or if I can be of further assistance.    ADDENDUM 2:43PM: Echo reviewed. Normal EF 60%. No significant valve disease.   High complexity     Adonis Valdovinos MD MD, FACC, FASE, [facility]

## 2025-01-21 NOTE — PROGRESS NOTES
[facility] Care Coordination Contact      [facility] Health Plan or Product Change    CC received notification that member's health plan or health plan product changed from [employer] to [employer] effective 1/21/25.    THRA completed with daughter Caroline Gray as member in currently inpt at [facility].     Called member and introduced self as member s new CC. Confirmed with member that the welcome letter with writer's name and contact information has been received.  Reviewed [employer] Assessment and Support Plan with member. No changes noted.  Health Plan Transfers: Transitional HRA completed. Support Plan updated and reflects current services.    Required referral authorization information communicated to CMS: Yes  Writer reviewed the following with member:  ER visits: No  Hospitalizations: Yes -  [facility] 1/8/25 - [resent  TCU stays: No  Significant health status changes: Currently inpt  Falls/Injuries: No  ADL/IADL changes: unclear at this time with inpt status  Changes in services: No    Follow-Up Plan: Member informed of future contact, plan to f/u with member with at next regularly scheduled contact.  Contact information shared with member and family, encouraged member to call with any questions or concerns.

## 2025-01-21 NOTE — PLAN OF CARE
Goal Outcome Evaluation:       Problem: Adult Inpatient Plan of Care  Goal: Plan of Care Review  Description: The Plan of Care Review/Shift note should be completed every shift.  The Outcome Evaluation is a brief statement about your assessment that the patient is improving, declining, or no change.  This information will be displayed automatically on your shift  note.  Outcome: Not Progressing  Flowsheets (Taken 1/21/2025 0651)  Outcome Evaluation:   Remains intubated and sedated with precedex. Norepi remained on throughout the night to meet prescribed MAP. Remains on Amio and Heparin gtt. Bronched early in shift   patient tolerated well. BAL lab results delayed until reviewed by pathologist. No changs made to vent o/n. Earliest results may be obtained is 1/21/25. Remains in Afib but rate controlled 70s-90s. Lung sounds diminished and intermittently coarse. Crackles bilaterally in posterior lower lobes. Dialysis run scheduled today 1/21/25.  Plan of Care Reviewed With:   patient   family  Overall Patient Progress: no change  Goal: Absence of Hospital-Acquired Illness or Injury  Intervention: Identify and Manage Fall Risk  Recent Flowsheet Documentation  Taken 1/21/2025 0400 by Arnulfo Colon, RN  Safety Promotion/Fall Prevention:   clutter free environment maintained   increased rounding and observation   increase visualization of patient   room door open   room near nurse's station   safety round/check completed   room organization consistent   supervised activity   treat reversible contributory factors   treat underlying cause  Taken 1/21/2025 0000 by Arnulfo Colon, RN  Safety Promotion/Fall Prevention:   clutter free environment maintained   increased rounding and observation   increase visualization of patient   room door open   room near nurse's station   safety round/check completed   room organization consistent   supervised activity   treat reversible contributory factors   treat underlying cause  Taken  1/20/2025 2000 by Arnulfo Colon RN  Safety Promotion/Fall Prevention:   clutter free environment maintained   increased rounding and observation   increase visualization of patient   room door open   room near nurse's station   safety round/check completed   room organization consistent   supervised activity   treat reversible contributory factors   treat underlying cause  Intervention: Prevent Skin Injury  Recent Flowsheet Documentation  Taken 1/21/2025 0600 by Arnulfo Colon RN  Body Position:   legs elevated   heels elevated   foot of bed elevated   side-lying 30 degrees   weight shifting   upper extremity elevated   lower extremity elevated  Taken 1/21/2025 0400 by Arnulfo Colon RN  Body Position:   legs elevated   heels elevated   foot of bed elevated   side-lying 30 degrees   weight shifting   upper extremity elevated   lower extremity elevated  Skin Protection:   adhesive use limited   incontinence pads utilized   pulse oximeter probe site changed   silicone foam dressing in place   skin sealant/moisture barrier applied   transparent dressing maintained   tubing/devices free from skin contact  Taken 1/21/2025 0200 by Arnulfo Colon RN  Body Position:   legs elevated   heels elevated   foot of bed elevated   side-lying 30 degrees   weight shifting   upper extremity elevated   lower extremity elevated  Taken 1/21/2025 0000 by Arnulfo Colon RN  Body Position:   legs elevated   heels elevated   foot of bed elevated   side-lying 30 degrees   weight shifting   upper extremity elevated   lower extremity elevated  Skin Protection:   adhesive use limited   incontinence pads utilized   pulse oximeter probe site changed   silicone foam dressing in place   skin sealant/moisture barrier applied   transparent dressing maintained   tubing/devices free from skin contact  Taken 1/20/2025 2200 by Arnulfo Colon RN  Body Position:   legs elevated   heels elevated   foot of bed elevated   side-lying 30 degrees   weight  shifting   upper extremity elevated   lower extremity elevated  Taken 1/20/2025 2000 by Arnulfo Colon RN  Body Position:   turned   heels elevated   legs elevated   lower extremity elevated   upper extremity elevated  Skin Protection:   adhesive use limited   incontinence pads utilized   pulse oximeter probe site changed   silicone foam dressing in place   skin sealant/moisture barrier applied   transparent dressing maintained   tubing/devices free from skin contact  Intervention: Prevent and Manage VTE (Venous Thromboembolism) Risk  Recent Flowsheet Documentation  Taken 1/21/2025 0400 by Arnulfo Colon RN  VTE Prevention/Management: SCDs off (sequential compression devices)  Taken 1/21/2025 0000 by Arnulfo Colon RN  VTE Prevention/Management: SCDs off (sequential compression devices)  Taken 1/20/2025 2000 by Arnulfo Colon RN  VTE Prevention/Management: SCDs off (sequential compression devices)  Intervention: Prevent Infection  Recent Flowsheet Documentation  Taken 1/21/2025 0400 by Arnulfo Colon RN  Infection Prevention:   single patient room provided   rest/sleep promoted   personal protective equipment utilized   hand hygiene promoted   equipment surfaces disinfected  Taken 1/21/2025 0000 by Arnulfo Colon RN  Infection Prevention:   single patient room provided   rest/sleep promoted   personal protective equipment utilized   hand hygiene promoted   equipment surfaces disinfected  Taken 1/20/2025 2000 by Arnulfo Colon RN  Infection Prevention:   single patient room provided   rest/sleep promoted   personal protective equipment utilized   hand hygiene promoted   equipment surfaces disinfected  Goal: Optimal Comfort and Wellbeing  Intervention: Provide Person-Centered Care  Recent Flowsheet Documentation  Taken 1/21/2025 0400 by Arnulfo Colon RN  Trust Relationship/Rapport:   care explained   reassurance provided  Taken 1/21/2025 0000 by Arnulfo Colon RN  Trust Relationship/Rapport:   care  explained   reassurance provided  Taken 1/20/2025 2000 by Arnulfo Colon RN  Trust Relationship/Rapport:   care explained   reassurance provided     Problem: Fall Injury Risk  Goal: Absence of Fall and Fall-Related Injury  Intervention: Identify and Manage Contributors  Recent Flowsheet Documentation  Taken 1/21/2025 0400 by Arnulfo Colon RN  Medication Review/Management: medications reviewed  Taken 1/21/2025 0000 by Arnulfo Colon RN  Medication Review/Management: medications reviewed  Taken 1/20/2025 2000 by Arnulfo Colon RN  Medication Review/Management: medications reviewed  Intervention: Promote Injury-Free Environment  Recent Flowsheet Documentation  Taken 1/21/2025 0400 by Arnulfo Colon RN  Safety Promotion/Fall Prevention:   clutter free environment maintained   increased rounding and observation   increase visualization of patient   room door open   room near nurse's station   safety round/check completed   room organization consistent   supervised activity   treat reversible contributory factors   treat underlying cause  Taken 1/21/2025 0000 by Arnulfo Colon RN  Safety Promotion/Fall Prevention:   clutter free environment maintained   increased rounding and observation   increase visualization of patient   room door open   room near nurse's station   safety round/check completed   room organization consistent   supervised activity   treat reversible contributory factors   treat underlying cause  Taken 1/20/2025 2000 by Arnulfo Colon RN  Safety Promotion/Fall Prevention:   clutter free environment maintained   increased rounding and observation   increase visualization of patient   room door open   room near nurse's station   safety round/check completed   room organization consistent   supervised activity   treat reversible contributory factors   treat underlying cause     Problem: Pain Acute  Goal: Optimal Pain Control and Function  Outcome: Progressing  Intervention: Optimize Psychosocial  Wellbeing  Recent Flowsheet Documentation  Taken 1/21/2025 0400 by Arnulfo Colon RN  Supportive Measures:   positive reinforcement provided   relaxation techniques promoted  Taken 1/21/2025 0000 by Arnulfo Colon RN  Supportive Measures:   positive reinforcement provided   relaxation techniques promoted  Taken 1/20/2025 2000 by Arnulfo Colon RN  Supportive Measures:   positive reinforcement provided   relaxation techniques promoted  Intervention: Prevent or Manage Pain  Recent Flowsheet Documentation  Taken 1/21/2025 0400 by Arnulfo Colon RN  Sensory Stimulation Regulation:   care clustered   lighting decreased   music on   quiet environment promoted  Sleep/Rest Enhancement:   awakenings minimized   comfort measures   noise level reduced   regular sleep/rest pattern promoted   relaxation techniques promoted   room darkened  Medication Review/Management: medications reviewed  Taken 1/21/2025 0000 by Arnulfo Colon RN  Sensory Stimulation Regulation:   care clustered   lighting decreased   music on   quiet environment promoted  Sleep/Rest Enhancement:   awakenings minimized   comfort measures   noise level reduced   regular sleep/rest pattern promoted   relaxation techniques promoted   room darkened  Medication Review/Management: medications reviewed  Taken 1/20/2025 2000 by Arnulfo Colon RN  Sensory Stimulation Regulation:   care clustered   lighting decreased   music on   quiet environment promoted  Sleep/Rest Enhancement:   awakenings minimized   comfort measures   noise level reduced   regular sleep/rest pattern promoted   relaxation techniques promoted   room darkened  Medication Review/Management: medications reviewed     Problem: Gas Exchange Impaired  Goal: Optimal Gas Exchange  Outcome: Not Progressing  Intervention: Optimize Oxygenation and Ventilation  Recent Flowsheet Documentation  Taken 1/21/2025 0600 by Arnulfo Colon RN  Head of Bed (HOB) Positioning: HOB at 30 degrees  Taken 1/21/2025 0400  by Darlene, Arnulfo, RN  Airway/Ventilation Management:   airway patency maintained   pulmonary hygiene promoted   calming measures promoted   position adjusted   oxygen therapy provided  Head of Bed (HOB) Positioning: HOB at 30 degrees  Taken 1/21/2025 0200 by Arnulfo Colon RN  Head of Bed (HOB) Positioning: HOB at 30 degrees  Taken 1/21/2025 0000 by Arnulfo Colon RN  Airway/Ventilation Management:   airway patency maintained   pulmonary hygiene promoted   calming measures promoted   position adjusted   oxygen therapy provided  Head of Bed (HOB) Positioning: HOB at 30 degrees  Taken 1/20/2025 2200 by Arnulfo Colon RN  Head of Bed (HOB) Positioning: HOB at 30 degrees  Taken 1/20/2025 2000 by Arnulfo Colon RN  Airway/Ventilation Management:   airway patency maintained   pulmonary hygiene promoted   calming measures promoted   position adjusted   oxygen therapy provided  Head of Bed (HOB) Positioning: HOB at 30 degrees     Problem: Restraint, Nonviolent  Goal: Absence of Harm or Injury  Intervention: Implement Least Restrictive Safety Strategies  Recent Flowsheet Documentation  Taken 1/21/2025 0400 by Arnulfo Colon RN  Medical Device Protection:   IV pole/bag removed from visual field   tubing secured  Taken 1/21/2025 0000 by Arnulfo Colon RN  Medical Device Protection:   IV pole/bag removed from visual field   tubing secured  Taken 1/20/2025 2000 by Arnulfo Colon RN  Medical Device Protection:   IV pole/bag removed from visual field   tubing secured  Intervention: Protect Dignity, Rights and Personal Wellbeing  Recent Flowsheet Documentation  Taken 1/21/2025 0400 by Arnulfo Colon RN  Trust Relationship/Rapport:   care explained   reassurance provided  Taken 1/21/2025 0000 by Arnulfo Colon RN  Trust Relationship/Rapport:   care explained   reassurance provided  Taken 1/20/2025 2000 by Arnulfo Colon RN  Trust Relationship/Rapport:   care explained   reassurance provided  Intervention: Protect Skin and  Joint Integrity  Recent Flowsheet Documentation  Taken 1/21/2025 0600 by Arnulfo Colon RN  Body Position:   legs elevated   heels elevated   foot of bed elevated   side-lying 30 degrees   weight shifting   upper extremity elevated   lower extremity elevated  Taken 1/21/2025 0400 by Arnulfo Colon RN  Body Position:   legs elevated   heels elevated   foot of bed elevated   side-lying 30 degrees   weight shifting   upper extremity elevated   lower extremity elevated  Skin Protection:   adhesive use limited   incontinence pads utilized   pulse oximeter probe site changed   silicone foam dressing in place   skin sealant/moisture barrier applied   transparent dressing maintained   tubing/devices free from skin contact  Range of Motion: active ROM (range of motion) encouraged  Taken 1/21/2025 0200 by Arnulfo Colon RN  Body Position:   legs elevated   heels elevated   foot of bed elevated   side-lying 30 degrees   weight shifting   upper extremity elevated   lower extremity elevated  Taken 1/21/2025 0000 by Arnulfo Colon RN  Body Position:   legs elevated   heels elevated   foot of bed elevated   side-lying 30 degrees   weight shifting   upper extremity elevated   lower extremity elevated  Skin Protection:   adhesive use limited   incontinence pads utilized   pulse oximeter probe site changed   silicone foam dressing in place   skin sealant/moisture barrier applied   transparent dressing maintained   tubing/devices free from skin contact  Range of Motion: active ROM (range of motion) encouraged  Taken 1/20/2025 2200 by Arnulfo Colon RN  Body Position:   legs elevated   heels elevated   foot of bed elevated   side-lying 30 degrees   weight shifting   upper extremity elevated   lower extremity elevated  Taken 1/20/2025 2000 by Arnulfo Colon RN  Body Position:   turned   heels elevated   legs elevated   lower extremity elevated   upper extremity elevated  Skin Protection:   adhesive use limited   incontinence pads  utilized   pulse oximeter probe site changed   silicone foam dressing in place   skin sealant/moisture barrier applied   transparent dressing maintained   tubing/devices free from skin contact  Range of Motion: active ROM (range of motion) encouraged     Problem: Risk for Delirium  Goal: Optimal Coping  Outcome: Not Progressing  Intervention: Optimize Psychosocial Adjustment to Delirium  Recent Flowsheet Documentation  Taken 1/21/2025 0400 by Arnulfo Colon RN  Supportive Measures:   positive reinforcement provided   relaxation techniques promoted  Taken 1/21/2025 0000 by Arnulfo Colon RN  Supportive Measures:   positive reinforcement provided   relaxation techniques promoted  Taken 1/20/2025 2000 by Arnulfo Colon RN  Supportive Measures:   positive reinforcement provided   relaxation techniques promoted  Goal: Improved Behavioral Control  Outcome: Not Progressing  Intervention: Prevent and Manage Agitation  Recent Flowsheet Documentation  Taken 1/21/2025 0400 by Arnulfo Colon RN  Environment Familiarity/Consistency: daily routine followed  Taken 1/21/2025 0000 by Arnulfo Colon RN  Environment Familiarity/Consistency: daily routine followed  Taken 1/20/2025 2000 by Arnulfo Colon RN  Environment Familiarity/Consistency: daily routine followed  Intervention: Minimize Safety Risk  Recent Flowsheet Documentation  Taken 1/21/2025 0400 by Arnulfo Colon RN  Enhanced Safety Measures:   room near unit station   review medications for side effects with activity   patient/family teach back on injury risk   pain management   monitor patients coagulation values   family to remain at bedside   assistive devices when indicated  Trust Relationship/Rapport:   care explained   reassurance provided  Taken 1/21/2025 0000 by Arnulfo Colon RN  Enhanced Safety Measures:   room near unit station   review medications for side effects with activity   patient/family teach back on injury risk   pain management   monitor patients  coagulation values   family to remain at bedside   assistive devices when indicated  Trust Relationship/Rapport:   care explained   reassurance provided  Taken 1/20/2025 2000 by Arnulfo Colon, RN  Enhanced Safety Measures:   room near unit station   review medications for side effects with activity   patient/family teach back on injury risk   pain management   monitor patients coagulation values   family to remain at bedside   assistive devices when indicated  Trust Relationship/Rapport:   care explained   reassurance provided  Goal: Improved Attention and Thought Clarity  Outcome: Not Progressing  Intervention: Maximize Cognitive Function  Recent Flowsheet Documentation  Taken 1/21/2025 0400 by Arnulfo Colon, RN  Sensory Stimulation Regulation:   care clustered   lighting decreased   music on   quiet environment promoted  Reorientation Measures:   reorientation provided   clock in view   calendar in view  Taken 1/21/2025 0000 by Arnulfo Colon RN  Sensory Stimulation Regulation:   care clustered   lighting decreased   music on   quiet environment promoted  Reorientation Measures:   reorientation provided   clock in view   calendar in view  Taken 1/20/2025 2000 by Arnulfo Colon, RN  Sensory Stimulation Regulation:   care clustered   lighting decreased   music on   quiet environment promoted  Reorientation Measures:   reorientation provided   clock in view   calendar in view  Goal: Improved Sleep  Outcome: Not Progressing  Intervention: Promote Sleep  Recent Flowsheet Documentation  Taken 1/21/2025 0400 by Arnulfo Colon, RN  Sleep/Rest Enhancement:   awakenings minimized   comfort measures   noise level reduced   regular sleep/rest pattern promoted   relaxation techniques promoted   room darkened  Taken 1/21/2025 0000 by Arnulfo Colon, RN  Sleep/Rest Enhancement:   awakenings minimized   comfort measures   noise level reduced   regular sleep/rest pattern promoted   relaxation techniques promoted   room  darkened  Taken 1/20/2025 2000 by Arnulfo Colon, RN  Sleep/Rest Enhancement:   awakenings minimized   comfort measures   noise level reduced   regular sleep/rest pattern promoted   relaxation techniques promoted   room darkened

## 2025-01-21 NOTE — PROGRESS NOTES
ICU Progress Note    Date of Service: 01/21/25    A/P:      I have personally reviewed the daily labs, imaging studies, cultures and discussed the case with referring physician and consulting physicians.     Neuro  Intubated  Likely baseline cognitive impairment  - Precedex for sedation for mechanical ventilation  - PTA Zoloft, gabapentin    Pulmonary  Acute hypoxemic respiratory failure  Influenza A  COPD exacerbation  Hx of JONE  ? Hospital acquired pneumonia  - Continue methylpred 40mg q24 through 1/23  - Continue nebs  - Cont pulmicort nebs BID  - iHD planned for today  - Broad spectrum antibiotics started 1/20 following bronch, gram stain with GPCs and GNRs    Cardiac  Shock, likely due to sedation  - MAP goal > 65, currently on low dose norepi  - Transition amio gtt to PO  - IV heparin gtt    Renal  ESRD on HD  - Nephrology consult, appreciate recs  - iHD planned today  - Overall appears fluid overloaded, can use low dose pressors to help with fluid removal    GI  No active issues  - RD to manage TFs    Heme/Onc  Chronic anemia  Hx of Afib  - monitor CBC  - Transfuse for Hgb < 7  - Heparin gtt    ID  Influenza A pneumona  ? Hospital acquired pneumonia  - Tamiflu treatment completed  - Cefepime/Vanc restarted 1/20 after bronch  - Sputum gram stain with GPCs and GNRs    Endo  Hx of DM  - monitor BG  - sliding scale insulin    Clinically Significant Risk Factors         # Hyponatremia: Lowest Na = 129 mmol/L in last 2 days, will monitor as appropriate  # Hypochloremia: Lowest Cl = 87 mmol/L in last 2 days, will monitor as appropriate   # Hypercalcemia: Highest Ca = 10.7 mg/dL in last 2 days, will monitor as appropriate   # Anion Gap Metabolic Acidosis: Highest Anion Gap = 20 mmol/L in last 2 days, will monitor and treat as appropriate  # Hypoalbuminemia: Lowest albumin = 2.8 g/dL at 1/13/2025  5:14 AM, will monitor as appropriate     # Hypertension: Noted on problem list     # Acute Hypoxic Respiratory Failure:  "Documented O2 saturation < 90%. Continue supplemental oxygen as needed  # Acute Hypercapnic Respiratory Failure: based on venous blood gas results.  Continue supplemental oxygen and ventilatory support as indicated.        # DMII: A1C = 6.5 % (Ref range: <5.7 %) within past 6 months   # Obesity: Estimated body mass index is 33.15 kg/m  as calculated from the following:    Height as of this encounter: 1.702 m (5' 7\").    Weight as of this encounter: 96 kg (211 lb 10.3 oz).      # Financial/Environmental Concerns: none                PPX  1. DVT: heparin gtt   2. VAP: HOB 30 degrees, chlorhexidine rinse  3. Stress Ulcer: PPI  4. Restraints: Nonviolent soft two point restraints required and necessary for patient safety and continued cares and good effect as patient continues to pull at necessary lines, tubes despite education and distraction. Will readdress daily.   5. Wound care - per unit routine   6. Feeding - TF  7. Family updated via phone.    Interval Hx:  Intubated yesterday afternoon. Waking up and following commands today, reports she feels comfortable.    Unable to obtain ROS 2/2 sedation/intubation.    /46   Pulse 87   Temp 98  F (36.7  C) (Axillary)   Resp 16   Ht 1.702 m (5' 7\")   Wt 96 kg (211 lb 10.3 oz)   LMP  (LMP Unknown)   SpO2 100%   BMI 33.15 kg/m    Gen: supine, NAD  Neuro: sedated, pupils equal  HEENT: anicteric  Card: Afib on monitor, regular rate  Pulm: Intubated, equal chest rise  Abd: soft, non-distended  MSK: no edema, no acute joint abnormality. Left BKA  Skin: no obvious rash    FiO2 (%): 50 %, Resp: 16, Vent Mode: CMV/AC, Resp Rate (Set): 16 breaths/min, Tidal Volume (Set, mL): 450 mL, PEEP (cm H2O): 8 cmH2O, Resp Rate (Set): 16 breaths/min, Tidal Volume (Set, mL): 450 mL, PEEP (cm H2O): 8 cmH2O      Intake/Output Summary (Last 24 hours) at 1/21/2025 0839  Last data filed at 1/21/2025 0600  Gross per 24 hour   Intake 1083.99 ml   Output 2000 ml   Net -916.01 ml       Labs: " reviewed    Imaging: reviewed    Billing: Patient is critically ill. Total critical care time today, excluding procedures, was 40 minutes.    Discussed with staff, Dr. Watt.    Truman Moreland  Surgical Critical Care Fellow

## 2025-01-21 NOTE — PROCEDURES
INTERVENTIONAL PULMONOLOGY       Procedure(s):    A flexible bronchoscopy  Airway exam  BAL  Therapeutic suctioning (1 sites)    Indication:  mucous plugging,     Attending of Record:  Ravin King MD     Interventional Pulmonary Fellow   None    Medications:    continued on ICU gtt medications (see MAR)    Sedation Time:   None    Time Out:  Performed    The patient's medical record has been reviewed.  The indication for the procedure was reviewed.  The necessary history and physical examination was performed and reviewed.  The risks, benefits and alternatives of the procedure were discussed with the the patient's representative in detail and they had the opportunity to ask questions.  I discussed in particular the potential complications including risks of minor or life-threatening bleeding and/or infection, respiratory failure, vocal cord trauma / paralysis, pneumothorax, and discomfort. Sedation risks were also discussed including abnormal heart rhythms, low blood pressure, and respiratory failure. All questions were answered to the best of my ability.  Verbal and written informed consent was obtained.  The proposed procedure and the patient's identification were verified prior to the procedure by the physician and the ICU Team.    The patient was assessed for the adequacy for the procedure and to receive medications.   Mental Status:  Sedated  Airway examination:  Unable to assess due to artificial airway  Pulmonary:  Mech ventilator sounds   CV:  irregularly, irregular   ASA Grade:  (III)  Severe systemic disease    After clinical evaluation and reviewing the indication, risks, alternatives and benefits of the procedure the patient was deemed to be in satisfactory condition to undergo the procedure.           A Tuberculosis risk assessment was performed:  The patient has no known RISK of Tuberculosis    The procedure was performed in a negative airflow room: The patient could not be moved to a negative  airflow room because of critical illness and instability    Maneuvers / Procedure:      A Flexible  bronchoscope was used for the procedure. The flexible bronchoscope was inserted through a # 7 ETT and passed through.    Airway Examination: A complete airway examination was performed from the distal trachea to the subsegmental level in each lobe of both lungs.  Pertinent findings include no sig mucous plugs or secretions. Does have some segmental malacia on both sides. Couldn't visualize any central malacia.        BAL: The bronchoscope was wedged into the RLL-anterior segment. A total of 120cc of saline was instilled and a total of 40cc was aspirated. This was sent for analysis.   Therapeutic suctioning: 15-20min of operative time was spent clearing out the airway of debris, blood and mucous prior to the intervention.     Relevant Pictures    Recommendations:     -->  Successful flex bronch, therapeutic suctioning, BAL and airway exam   -->  No central obstruction or mucous plugging to cause resp failure. IR only able to get 100cc from the right pleural space. Resp failure likely from atelectasis vs RLL consolidation. BAL sent from RLL.        Ravin King MD, MHA  Associate Professor of Medicine  Section of Interventional Pulmonology   Division of Pulmonary, Allergy, Critical Care and Sleep Medicine   [facility]  Pager: [phone]   Office: [phone]  Email: [email or web address]    Shelia Torres RN   Interventional Pulmonary Care Coordinator   Office: [phone]  Email: [email or web address]     Jass Clement  Interventional Pulmonary Surgery Scheduler   Office: [phone]  Email: [email or web address]

## 2025-01-21 NOTE — PROGRESS NOTES
Renal Medicine Inpatient Dialysis Note                                Supriya Herr MRN# [id number]   Age: 75 year old YOB: 1950   Date of Admission: 1/8/2025 Hospital LOS: 13          Assessment/Plan:     1) Influenza A with hypoxic resp failure.   On oseltamivir at ESRD dose.   Extubated   Remains above target weight although not overtly very hypervolemic.     2) end-stage renal disease   Chronic dialysis at [facility], Tuesday Thursday Saturday   Primary nephrologist Dr. Basilio  Access: Right AV fistula,   Run time 3.5 hours as outpatient     3) Anemia: HGB today 9. 2, stable she is on Mircera as outpt.  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  Vit D 2000 units daily.  Renvela 1600 mg TID c meals and 800 mg with snacks.      TTS schedule       Interval History:     Re intubated 01.20.25  2 hour dialysis with UF 2 liter 01.20.25    Scheduled run with UF as tolerated to 3.5 liter  Low dose NE    Dialysis run parameters reviewed with dialysis RN at patient bedside.    3.5 hours  2K   UF as tolerated     ROS     Intubated and sedated: ROS unable    Dialysis Parameters:     Vitals were reviewed  Patient Vitals for the past 8 hrs:   BP Temp Temp src Pulse Resp SpO2 Weight   01/21/25 0730 118/46 -- -- 87 16 100 % --   01/21/25 0717 -- -- -- -- -- 100 % --   01/21/25 0715 122/55 -- -- 86 16 100 % --   01/21/25 0700 121/49 -- -- 87 16 100 % --   01/21/25 0645 119/41 -- -- 84 16 100 % --   01/21/25 0630 121/59 -- -- 84 16 100 % --   01/21/25 0615 117/41 -- -- 107 16 100 % --   01/21/25 0600 121/48 -- -- 93 16 100 % --   01/21/25 0545 117/41 -- -- 88 16 100 % --   01/21/25 0530 116/50 -- -- 83 16 100 % --   01/21/25 0515 117/41 -- -- 87 16 100 % --   01/21/25 0500 (!) 113/35 -- -- 92 16 100 % --   01/21/25 0445 (!) 123/39 -- -- 85 16 100 % --   01/21/25 0430 123/58 -- -- 86 16 100 %  "--   01/21/25 0415 124/48 -- -- 85 16 100 % --   01/21/25 0400 126/60 98  F (36.7  C) Axillary 97 16 100 % 96 kg (211 lb 10.3 oz)   01/21/25 0345 127/40 -- -- 86 16 100 % --   01/21/25 0330 125/51 -- -- 88 16 100 % --   01/21/25 0315 119/46 -- -- 112 16 100 % --   01/21/25 0300 118/63 -- -- 90 16 100 % --   01/21/25 0245 123/63 -- -- 87 16 100 % --   01/21/25 0230 123/41 -- -- 82 16 100 % --     Wt Readings from Last 4 Encounters:   01/21/25 96 kg (211 lb 10.3 oz)   06/14/24 101.1 kg (222 lb 14.2 oz)   06/03/24 101.1 kg (222 lb 14.2 oz)   04/24/24 101.1 kg (222 lb 14.2 oz)     I/O last 3 completed shifts:  In: 1083.99 [I.V.:1083.99]  Out: 2000 [Other:2000]    Vitals:    01/17/25 0600 01/18/25 0200 01/19/25 0400 01/20/25 0500   Weight: 100.7 kg (222 lb 0.1 oz) 101.2 kg (223 lb 1.7 oz) 99.3 kg (218 lb 14.7 oz) 100.4 kg (221 lb 5.5 oz)    01/21/25 0400   Weight: 96 kg (211 lb 10.3 oz)       Current Weight: 96  Dry Weight: 92 ?  Dialysis Temp: 36.5  C  Access Device: AVF  Access Site: right  Dialyzer: Revaclear  Dialysis Bath: 2  Sodium Profile: n  UF Goal: 3  Blood Flow Rate (mL/min): 400  Total Treatment Time (hrs): 3.5  Heparin: Low dose as required      EPO dose: n  Zemplar: n  IV Fe: n      Medications and Allergies:     Reviewed      Physical Exam:     Seen and examined during course of dialysis run    /46   Pulse 87   Temp 98  F (36.7  C) (Axillary)   Resp 16   Ht 1.702 m (5' 7\")   Wt 96 kg (211 lb 10.3 oz)   LMP  (LMP Unknown)   SpO2 100%   BMI 33.15 kg/m      GENERAL: intubated  HEENT: ETT  RESP: clear anteriorly  CV: RRR, normal S1 S2  MS: no edema  EXT: access canulated without issue    Data:       Recent Labs   Lab 01/21/25  0839 01/21/25  0545   NA  --  129*   POTASSIUM  --  4.7   CHLORIDE  --  87*   CO2  --  22   ANIONGAP  --  20*   * 187*   BUN  --  64.3*   CR  --  3.86*   GFRESTIMATED  --  11*   JODY  --  10.1     Recent Labs   Lab 01/21/25 0545 01/20/25  0539   POTASSIUM 4.7 5.0 "     Recent Labs   Lab 01/16/25  0617   ALBUMIN 3.2*     Recent Labs   Lab 01/21/25  0545 01/20/25  1845 01/20/25  0539 01/19/25  0514   PHOS  --   --  7.8*  --    HGB 9.6* 10.7* 10.2* 9.3*         G Dereck Hawley MD    [facility] - Nephrology  [phone]

## 2025-01-21 NOTE — PROGRESS NOTES
CLINICAL NUTRITION SERVICES - REASSESSMENT NOTE     Malnutrition Status:    % Intake: Decreased intake does not meet criteria (day #4 NPO, TF was previously at goal)  % Weight Loss: Weight loss does not meet criteria  (fluids, down closer to admit weight)  Subcutaneous Fat Loss: None observed (visual, in isolation)  Muscle Loss: None observed (visual, in isolation) - may be masked by obesity   Fluid Accumulation/Edema: Mild, 1+ (likely not nutritional)  Malnutrition Diagnosis: Patient does not meet two of the established criteria necessary for diagnosing malnutrition  Malnutrition Present on Admission: Unable to assess    Registered Dietitian Interventions:  Orders written to resume TF at 15 mL/hr and increase every 3 hours by 20 mL to goal      SUBJECTIVE INFORMATION  Patient not available for interview due to intubated status     CURRENT NUTRITION ORDERS  Diet: NPO  Nutrition Support: TF has been off since 1/18 d/t extubation (OG pulled)  Previous goal TF regimen was as follows ~    Nutrition Support Enteral:  Type of Feeding Tube: ND  Enteral Frequency:  Continuous  Enteral Regimen: Vital HP at 55 mL/hr  Total Enteral Provisions: 1230 kcal (14 kcal/kg), 115 g protein (2.1 g/kg and 105% needs), 147 g CHO, 0 g fiber, 1104 mL H2O  Free Water Flush: 60 mL every 4 hours       CURRENT INTAKE/TOLERANCE  N/A -- TF has been off since 1/18     NEW FINDINGS  Weight: 96 kg (stable from dosing weight but trending back down over the last several days) - Loss likely d/t fluids, Lasix, HD   Skin/wounds: Left buttock wound d/t friction (WOCN following)   GI symptoms: BM x 1 today and x 4 yesterday (previously constipated) - monitor   Nutrition-relevant labs:  Na 129 (L), K normal, -240 (High sliding scale insulin + Lantus 20 units BID)  Nutrition-relevant medications:  Norepi drip, Lasix    MALNUTRITION  % Intake: Decreased intake does not meet criteria (day #4 NPO, TF was previously at goal)  % Weight Loss: Weight loss  does not meet criteria  (fluids, down closer to admit weight)  Subcutaneous Fat Loss: None observed (visual, in isolation)  Muscle Loss: None observed (visual, in isolation) - may be masked by obesity   Fluid Accumulation/Edema: Mild, 1+ (likely not nutritional)  Malnutrition Diagnosis: Patient does not meet two of the established criteria necessary for diagnosing malnutrition  Malnutrition Present on Admission: Unable to assess    EVALUATION OF THE PROGRESS TOWARD GOALS   Previous Goals (1/16)  Goal TF regimen will meet % needs   Evaluation: Unable to meet    Previous Nutrition Diagnosis (1/16)  Inadequate energy intake related to TF at goal, Propofol off as evidenced by meeting 88% low-end energy needs   Evaluation: Declining    NUTRITION DIAGNOSIS  Inadequate protein energy intake related to NPO with plans to resume TF as evidenced by meeting 0% needs     INTERVENTIONS  Enteral nutrition management - Orders written to resume TF at 15 mL/hr and increase every 3 hours by 20 mL to goal   Team meeting involving nutrition professional - Patient discussed today during interdisciplinary bedside rounds    Goals  Goal TF regimen will meet % needs while NPO      Monitoring/Evaluation      Progress toward goals will be monitored and evaluated per policy    Miguelina Feliciano RD, LD, CNSC   Clinical Dietitian - [facility]

## 2025-01-21 NOTE — PLAN OF CARE
Problem: Adult Inpatient Plan of Care  Goal: Absence of Hospital-Acquired Illness or Injury  Intervention: Prevent Skin Injury  Recent Flowsheet Documentation  Taken 1/20/2025 1600 by Celestina Teixeira RN  Body Position:   turned   legs elevated   heels elevated   foot of bed elevated   side-lying 30 degrees   weight shifting   upper extremity elevated   lower extremity elevated  Taken 1/20/2025 1200 by Celestina Teixeira RN  Body Position:   turned   legs elevated   heels elevated   foot of bed elevated   side-lying 30 degrees   weight shifting   upper extremity elevated   lower extremity elevated  Taken 1/20/2025 1000 by Celestina Teixeira RN  Body Position:   turned   legs elevated   heels elevated   foot of bed elevated   side-lying 30 degrees   weight shifting   upper extremity elevated   lower extremity elevated  Taken 1/20/2025 0800 by Celestina Teixeira RN  Body Position:   turned   legs elevated   heels elevated   foot of bed elevated   side-lying 30 degrees   weight shifting   upper extremity elevated   lower extremity elevated     Problem: Pain Acute  Goal: Optimal Pain Control and Function  Intervention: Optimize Psychosocial Wellbeing  Recent Flowsheet Documentation  Taken 1/20/2025 1600 by Celestina Teixeira RN  Supportive Measures:   active listening utilized   positive reinforcement provided   relaxation techniques promoted  Taken 1/20/2025 1200 by Celestina Teixeira RN  Supportive Measures:   active listening utilized   positive reinforcement provided   relaxation techniques promoted  Taken 1/20/2025 0800 by Celestina Teixeira RN  Supportive Measures:   active listening utilized   positive reinforcement provided   relaxation techniques promoted  Intervention: Prevent or Manage Pain  Recent Flowsheet Documentation  Taken 1/20/2025 1600 by Celestina Teixeira RN  Sensory Stimulation Regulation:   care clustered   quiet environment promoted  Medication Review/Management:   high-risk  medications identified   medications reviewed  Taken 1/20/2025 1200 by Celestina Teixeira RN  Sensory Stimulation Regulation:   care clustered   quiet environment promoted  Medication Review/Management:   high-risk medications identified   medications reviewed  Taken 1/20/2025 0800 by Celestina Teixeira RN  Sensory Stimulation Regulation:   care clustered   quiet environment promoted  Medication Review/Management:   high-risk medications identified   medications reviewed     Problem: Gas Exchange Impaired  Goal: Optimal Gas Exchange  Intervention: Optimize Oxygenation and Ventilation  Recent Flowsheet Documentation  Taken 1/20/2025 1600 by Celestina Teixeira RN  Head of Bed (HOB) Positioning: HOB at 30 degrees  Taken 1/20/2025 1200 by Celestina Teixeira RN  Head of Bed (HOB) Positioning: HOB at 30 degrees  Taken 1/20/2025 1000 by Celestina Teixeira RN  Head of Bed (HOB) Positioning: HOB at 30 degrees  Taken 1/20/2025 0800 by Celestina Teixeira RN  Head of Bed (HOB) Positioning: HOB at 30 degrees     Problem: Risk for Delirium  Goal: Improved Behavioral Control  Intervention: Minimize Safety Risk  Recent Flowsheet Documentation  Taken 1/20/2025 1600 by Celestina Teixeira RN  Enhanced Safety Measures:   room near unit station   review medications for side effects with activity   patient/family teach back on injury risk   pain management   monitor patients coagulation values   family to remain at bedside   assistive devices when indicated  Trust Relationship/Rapport:   care explained   choices provided   reassurance provided   questions encouraged   questions answered  Taken 1/20/2025 1200 by Celestina Teixeira RN  Enhanced Safety Measures:   room near unit station   review medications for side effects with activity   patient/family teach back on injury risk   pain management   monitor patients coagulation values   family to remain at bedside   assistive devices when indicated  Trust  Relationship/Rapport:   care explained   choices provided   reassurance provided   questions encouraged   questions answered  Taken 1/20/2025 0800 by Celestina Teixeira RN  Enhanced Safety Measures:   room near unit station   review medications for side effects with activity   patient/family teach back on injury risk   pain management   monitor patients coagulation values   family to remain at bedside   assistive devices when indicated  Trust Relationship/Rapport:   care explained   choices provided   reassurance provided   questions encouraged   questions answered   Goal Outcome Evaluation:      Plan of Care Reviewed With: patient, family    Overall Patient Progress: decliningOverall Patient Progress: declining    Outcome Evaluation: Pt lethargic with altered LOC upon start of shift, RRT called, labs/xray completed. CXR found pleural effusion, thoracentesis to right lung with 0.1L removed. During thoracentesis, pt respiratory status declined and WOB increased, bipap attempted O2 sat mid 60's and slow to recover, pt was then intubated. VBGs significant for PH 7.24/CO2 64. Pt remains on amio gtt, HR a fib 80-140s throughout shift. Heparin gtt restarted following thoracentesis. Dialysis running currently, levo on intermittenly to maintain MAP >65. Daughter at bedside supportive of pt.

## 2025-01-21 NOTE — PROGRESS NOTES
[facility] ICU RESPIRATORY NOTE        Date of Admission: 1/8/2025    Date of Intubation (most recent): 1/20/2025    Reason for Mechanical Ventilation: Resp Failure    Number of Days on Mechanical Ventilation: 2    Met Criteria for Spontaneous Breathing Trial: No    Bite Block: No    Significant Events Today: Bronched and Sputum sent for TB rule out    ABG Results:   Recent Labs   Lab 01/20/25  1541 01/20/25  0830 01/17/25  1213 01/16/25  0901   O2PER 50 5 30 30         Current Vent Settings: FiO2 (%): 50 %, Resp: 16, Vent Mode: CMV/AC, Resp Rate (Set): 16 breaths/min, Tidal Volume (Set, mL): 450 mL, PEEP (cm H2O): 8 cmH2O, Resp Rate (Set): 16 breaths/min, Tidal Volume (Set, mL): 450 mL, PEEP (cm H2O): 8 cmH2O    Plan: Continue full vent support and assess for weaning    RT Deion on 1/21/2025 at 4:07 AM

## 2025-01-21 NOTE — PHARMACY-VANCOMYCIN DOSING SERVICE
Pharmacy Vancomycin Initial Note  Date of Service 2025  Patient's  1950  75 year old, female    Indication: Healthcare-Associated Pneumonia    Current estimated CrCl = Estimated Creatinine Clearance: 13.6 mL/min (A) (based on SCr of 4.27 mg/dL (H)).    Creatinine for last 3 days  2025:  4:55 AM Creatinine 3.97 mg/dL  2025:  5:14 AM Creatinine 2.90 mg/dL; 11:19 PM Creatinine 4.05 mg/dL  2025:  5:39 AM Creatinine 4.27 mg/dL    Recent Vancomycin Level(s) for last 3 days  No results found for requested labs within last 3 days.      Vancomycin IV Administrations (past 72 hours)        No vancomycin orders with administrations in past 72 hours.                    Nephrotoxins and other renal medications (From now, onward)      Start     Dose/Rate Route Frequency Ordered Stop    25 0500  vancomycin (VANCOCIN) 2,000 mg in 0.9% NaCl 520 mL intermittent infusion         2,000 mg  over 2 Hours Intravenous ONCE 25 0356      25 0355  vancomycin place cardoso - receiving intermittent dosing         1 each Intravenous SEE ADMIN INSTRUCTIONS 25 0356      25 1430  norepinephrine (LEVOPHED) 4 mg in  mL infusion PREMIX         0.01-0.6 mcg/kg/min × 100.4 kg  3.8-225.9 mL/hr  Intravenous CONTINUOUS 25 1418      25 1230  [Held by provider]  furosemide (LASIX) tablet 80 mg        (On hold since yesterday at 1418 until manually unheld; held by Truman Moreland MDHold Reason: Other)   Note to Pharmacy: Patient taking differently: Take 40 mg in a.m. and 80 mg in p.m. on dialysis days and 80 mg twice daily on non dialysis days    80 mg Oral or NG Tube 2 TIMES DAILY (Diuretics and Nitrates) 25 1214              Contrast Orders - past 72 hours (72h ago, onward)      None                 Plan:  Start vancomycin  2000 mg IV once then intermittent per levels.   Vancomycin monitoring method: Renal replacement therapy  Vancomycin therapeutic monitoring goal:  15-20 mg/L  Pharmacy will check vancomycin levels as appropriate in  24hr or pre-HD pending next HD plan .    Serum creatinine levels will be ordered daily for the first week of therapy and at least twice weekly for subsequent weeks or as appropriate for HD.      Magalie Kaba [facility]

## 2025-01-21 NOTE — TELEPHONE ENCOUNTER
[facility]  Fax:[phone]  Spec: [phone]  MO: [phone]

## 2025-01-21 NOTE — PROGRESS NOTES
Potassium   Date Value Ref Range Status   01/21/2025 4.7 3.4 - 5.3 mmol/L Final   02/02/2022 4.7 3.4 - 5.3 mmol/L Final   04/17/2021 4.2 3.4 - 5.3 mmol/L Final     Hemoglobin   Date Value Ref Range Status   01/21/2025 9.6 (L) 11.7 - 15.7 g/dL Final   04/16/2021 10.9 (L) 11.7 - 15.7 g/dL Final     Creatinine   Date Value Ref Range Status   01/21/2025 3.86 (H) 0.51 - 0.95 mg/dL Final   04/17/2021 5.98 (H) 0.52 - 1.04 mg/dL Final     Urea Nitrogen   Date Value Ref Range Status   01/21/2025 64.3 (H) 8.0 - 23.0 mg/dL Final   11/24/2021 37 (H) 7 - 30 mg/dL Final   04/17/2021 68 (H) 7 - 30 mg/dL Final     Sodium   Date Value Ref Range Status   01/21/2025 129 (L) 135 - 145 mmol/L Final   04/17/2021 137 133 - 144 mmol/L Final     INR   Date Value Ref Range Status   04/24/2024 1.11 0.85 - 1.15 Final   04/16/2021 1.14 0.86 - 1.14 Final       DIALYSIS PROCEDURE NOTE  Hepatitis status of previous patient on machine log was checked and verified ok to use with this patients hepatitis status.  Patient dialyzed for 2.5 hrs. on a K2 bath with a net fluid removal of  1L.  A BFR of 400 ml/min was obtained via a LUE AVF using 15 gauge needles.      The treatment plan was discussed with Dr. Hawley during the treatment.    Total heparin received during the treatment: 0 units.   Needle cannulation sites held x 10 min.     Meds given: Albumin   Complications: BP and HR labile throughout treatment. Required frequent titration of Levophed by bedside nurse to maintain appropriate BP. BP dropped significantly about half way through treatment with SBP in the 40's.UF stopped. Dr. Hawley aware and gave verbal order to keep UF off for remainder of tx and to reduce run to 2.5 hours today.     Person educated: Pt. Knowledge base CALLIE d/t sedation. Barriers to learning: sedated/intubated. Educated on procedure via verbal mode. The patient is unable to verbalize understanding and will require follow if status improves.   ICEBOAT? Timeout performed  pre-treatment  I: Patient was identified using 2 identifiers  C:  Consent Signed Yes  E: Equipment preventative maintenance is current and dialysis delivery system OK to use  B: Hepatitis B Surface Antigen: Non-reactive; Draw Date: 1/8/25      Hepatitis B Surface Antibody: Susceptible; Draw Date: 1/9/25  O: Dialysis orders present and complete prior to treatment  A: Vascular access verified and assessed prior to treatment  T: Treatment was performed at a clinically appropriate time  ?: Patient was allowed to ask questions and address concerns prior to treatment  See Adult Hemodialysis flowsheet in [facility] for further details and post assessment.  Machine water alarm in place and functioning. Transducer pods intact and checked every 15min.   Pt assisted with repositioning throughout dialysis treatment.  Pt dialyzed at bedside in ICU.  Chlorine/Chloramine water system checked every 4 hours.  [facility]      Post treatment report given to bedside RN (see flowsheet) regarding 3L of fluid removed, last /98.     Please remove patient dressing on AVF and AVG needle sites 24 hours after dialysis. If leaking occurs please apply a Band-Aid.     [facility] RN

## 2025-01-21 NOTE — PROGRESS NOTES
[facility] ICU RESPIRATORY NOTE        Date of Admission: 1/8/2025    Date of Intubation (most recent): 1/20/2025    Reason for Mechanical Ventilation: Respiratory Failure    Number of Days on Mechanical Ventilation: Day 2 since re-intubation    Met Criteria for Spontaneous Breathing Trial: Yes    Bite Block: No    Significant Events Today: Attempted pressure support trial this afternoon after dialysis. Placed on 12/8 35% FiO2. Volumes in the low 200's so patient was placed back on full support.     ABG Results:   Recent Labs   Lab 01/20/25  1541 01/20/25  0830 01/17/25  1213 01/16/25  0901   O2PER 50 5 30 30         Current Vent Settings: FiO2 (%): 40 %, Resp: 20, Vent Mode: CMV/AC, Resp Rate (Set): 12 breaths/min, Tidal Volume (Set, mL): 400 mL, PEEP (cm H2O): 8 cmH2O, Resp Rate (Set): 12 breaths/min, Tidal Volume (Set, mL): 400 mL, PEEP (cm H2O): 8 cmH2O    Plan: Continue to monitor patient on full ventilatory support overnight and assess for weaning daily.     Niraj Nava, RT on 1/21/2025 at 5:44 PM

## 2025-01-22 ENCOUNTER — APPOINTMENT (OUTPATIENT)
Dept: PHYSICAL THERAPY | Facility: CLINIC | Age: 75
DRG: 207 | End: 2025-01-22
Attending: HOSPITALIST
Payer: COMMERCIAL

## 2025-01-22 LAB
% LINING CELLS, BODY FLUID: 15 %
ACID FAST STAIN (ARUP): NORMAL
ACID FAST STAIN (ARUP): NORMAL
ANION GAP SERPL CALCULATED.3IONS-SCNC: 14 MMOL/L (ref 7–15)
ANNOTATION COMMENT IMP: NOT DETECTED
APPEARANCE FLD: ABNORMAL
APPEARANCE FLD: CLEAR
BUN SERPL-MCNC: 48.6 MG/DL (ref 8–23)
CALCIUM SERPL-MCNC: 10.1 MG/DL (ref 8.8–10.4)
CHLORIDE SERPL-SCNC: 95 MMOL/L (ref 98–107)
COLOR FLD: COLORLESS
COLOR FLD: YELLOW
CREAT SERPL-MCNC: 3.29 MG/DL (ref 0.51–0.95)
DEPRECATED M TB RPOB XXX QL NAA+PROBE: NORMAL
EGFRCR SERPLBLD CKD-EPI 2021: 14 ML/MIN/1.73M2
ERYTHROCYTE [DISTWIDTH] IN BLOOD BY AUTOMATED COUNT: 18.1 % (ref 10–15)
GALACTOMANNAN AG BAL QL: NEGATIVE
GALACTOMANNAN AG SPEC IA-ACNC: 0.05
GLUCOSE BLDC GLUCOMTR-MCNC: 157 MG/DL (ref 70–99)
GLUCOSE BLDC GLUCOMTR-MCNC: 170 MG/DL (ref 70–99)
GLUCOSE BLDC GLUCOMTR-MCNC: 170 MG/DL (ref 70–99)
GLUCOSE BLDC GLUCOMTR-MCNC: 221 MG/DL (ref 70–99)
GLUCOSE BLDC GLUCOMTR-MCNC: 272 MG/DL (ref 70–99)
GLUCOSE BLDC GLUCOMTR-MCNC: 284 MG/DL (ref 70–99)
GLUCOSE BLDC GLUCOMTR-MCNC: 314 MG/DL (ref 70–99)
GLUCOSE BLDC GLUCOMTR-MCNC: 323 MG/DL (ref 70–99)
GLUCOSE BLDC GLUCOMTR-MCNC: 347 MG/DL (ref 70–99)
GLUCOSE BLDC GLUCOMTR-MCNC: 363 MG/DL (ref 70–99)
GLUCOSE BLDC GLUCOMTR-MCNC: 365 MG/DL (ref 70–99)
GLUCOSE SERPL-MCNC: 314 MG/DL (ref 70–99)
HCO3 SERPL-SCNC: 26 MMOL/L (ref 22–29)
HCT VFR BLD AUTO: 25.3 % (ref 35–47)
HGB BLD-MCNC: 7.9 G/DL (ref 11.7–15.7)
LYMPHOCYTES NFR FLD MANUAL: 4 %
M TB DNA SPEC QL NAA+PROBE: NOT DETECTED
MCH RBC QN AUTO: 31.3 PG (ref 26.5–33)
MCHC RBC AUTO-ENTMCNC: 31.2 G/DL (ref 31.5–36.5)
MCV RBC AUTO: 100 FL (ref 78–100)
MONOS+MACROS NFR FLD MANUAL: 5 %
NEUTS BAND NFR FLD MANUAL: 61 %
OTHER CELLS FLD MANUAL: 15 %
PATH REPORT.COMMENTS IMP SPEC: NORMAL
PATH REPORT.FINAL DX SPEC: NORMAL
PATH REPORT.GROSS SPEC: NORMAL
PATH REPORT.MICROSCOPIC SPEC OTHER STN: NORMAL
PATH REPORT.RELEVANT HX SPEC: NORMAL
PATH REV: NORMAL
PATH REV: NORMAL
PLATELET # BLD AUTO: 199 10E3/UL (ref 150–450)
POTASSIUM SERPL-SCNC: 3.7 MMOL/L (ref 3.4–5.3)
RBC # BLD AUTO: 2.52 10E6/UL (ref 3.8–5.2)
SODIUM SERPL-SCNC: 135 MMOL/L (ref 135–145)
SPECIMEN SOURCE FLD: ABNORMAL
SPECIMEN SOURCE FLD: NORMAL
UFH PPP CHRO-ACNC: 0.23 IU/ML
UFH PPP CHRO-ACNC: 0.29 IU/ML
UFH PPP CHRO-ACNC: 0.33 IU/ML
VANCOMYCIN SERPL-MCNC: 24.6 UG/ML
WBC # BLD AUTO: 7.5 10E3/UL (ref 4–11)
WBC # FLD AUTO: 550 /UL
WBC # FLD AUTO: NORMAL 10*3/UL

## 2025-01-22 PROCEDURE — 97530 THERAPEUTIC ACTIVITIES: CPT | Mod: GP | Performed by: PHYSICAL THERAPIST

## 2025-01-22 PROCEDURE — 99231 SBSQ HOSP IP/OBS SF/LOW 25: CPT | Performed by: INTERNAL MEDICINE

## 2025-01-22 PROCEDURE — 94003 VENT MGMT INPAT SUBQ DAY: CPT

## 2025-01-22 PROCEDURE — 99291 CRITICAL CARE FIRST HOUR: CPT | Mod: FS

## 2025-01-22 PROCEDURE — 120N000004 HC R&B MS OVERFLOW

## 2025-01-22 PROCEDURE — 999N000157 HC STATISTIC RCP TIME EA 10 MIN

## 2025-01-22 PROCEDURE — 85520 HEPARIN ASSAY: CPT | Performed by: INTERNAL MEDICINE

## 2025-01-22 PROCEDURE — 97110 THERAPEUTIC EXERCISES: CPT | Mod: GP | Performed by: PHYSICAL THERAPIST

## 2025-01-22 PROCEDURE — 80202 ASSAY OF VANCOMYCIN: CPT

## 2025-01-22 PROCEDURE — 88108 CYTOPATH CONCENTRATE TECH: CPT | Mod: 26 | Performed by: PATHOLOGY

## 2025-01-22 PROCEDURE — 99292 CRITICAL CARE ADDL 30 MIN: CPT | Mod: FS

## 2025-01-22 PROCEDURE — 250N000011 HC RX IP 250 OP 636: Performed by: INTERNAL MEDICINE

## 2025-01-22 PROCEDURE — 88305 TISSUE EXAM BY PATHOLOGIST: CPT | Mod: 26 | Performed by: PATHOLOGY

## 2025-01-22 PROCEDURE — 200N000001 HC R&B ICU

## 2025-01-22 PROCEDURE — 94640 AIRWAY INHALATION TREATMENT: CPT | Mod: 76

## 2025-01-22 PROCEDURE — 94640 AIRWAY INHALATION TREATMENT: CPT

## 2025-01-22 PROCEDURE — 82435 ASSAY OF BLOOD CHLORIDE: CPT | Performed by: STUDENT IN AN ORGANIZED HEALTH CARE EDUCATION/TRAINING PROGRAM

## 2025-01-22 PROCEDURE — 85018 HEMOGLOBIN: CPT | Performed by: STUDENT IN AN ORGANIZED HEALTH CARE EDUCATION/TRAINING PROGRAM

## 2025-01-22 PROCEDURE — 250N000009 HC RX 250: Performed by: INTERNAL MEDICINE

## 2025-01-22 PROCEDURE — 250N000009 HC RX 250

## 2025-01-22 PROCEDURE — 999N000009 HC STATISTIC AIRWAY CARE

## 2025-01-22 PROCEDURE — 85049 AUTOMATED PLATELET COUNT: CPT | Performed by: STUDENT IN AN ORGANIZED HEALTH CARE EDUCATION/TRAINING PROGRAM

## 2025-01-22 PROCEDURE — 250N000011 HC RX IP 250 OP 636: Performed by: STUDENT IN AN ORGANIZED HEALTH CARE EDUCATION/TRAINING PROGRAM

## 2025-01-22 PROCEDURE — 250N000013 HC RX MED GY IP 250 OP 250 PS 637: Performed by: STUDENT IN AN ORGANIZED HEALTH CARE EDUCATION/TRAINING PROGRAM

## 2025-01-22 PROCEDURE — 250N000013 HC RX MED GY IP 250 OP 250 PS 637: Performed by: INTERNAL MEDICINE

## 2025-01-22 PROCEDURE — 80048 BASIC METABOLIC PNL TOTAL CA: CPT | Performed by: STUDENT IN AN ORGANIZED HEALTH CARE EDUCATION/TRAINING PROGRAM

## 2025-01-22 PROCEDURE — 250N000009 HC RX 250: Performed by: STUDENT IN AN ORGANIZED HEALTH CARE EDUCATION/TRAINING PROGRAM

## 2025-01-22 PROCEDURE — 250N000011 HC RX IP 250 OP 636

## 2025-01-22 RX ORDER — DEXTROSE MONOHYDRATE 100 MG/ML
INJECTION, SOLUTION INTRAVENOUS CONTINUOUS PRN
Status: DISCONTINUED | OUTPATIENT
Start: 2025-01-22 | End: 2025-01-30

## 2025-01-22 RX ADMIN — Medication 50 MCG: at 07:30

## 2025-01-22 RX ADMIN — INSULIN ASPART 7 UNITS: 100 INJECTION, SOLUTION INTRAVENOUS; SUBCUTANEOUS at 08:19

## 2025-01-22 RX ADMIN — MICONAZOLE NITRATE: 2 POWDER TOPICAL at 08:27

## 2025-01-22 RX ADMIN — BUDESONIDE 1 MG: 1 INHALANT ORAL at 07:22

## 2025-01-22 RX ADMIN — AMIODARONE HYDROCHLORIDE 0.5 MG/MIN: 1.8 INJECTION, SOLUTION INTRAVENOUS at 08:09

## 2025-01-22 RX ADMIN — IPRATROPIUM BROMIDE AND ALBUTEROL SULFATE 3 ML: .5; 3 SOLUTION RESPIRATORY (INHALATION) at 10:41

## 2025-01-22 RX ADMIN — SERTRALINE HYDROCHLORIDE 75 MG: 50 TABLET ORAL at 20:22

## 2025-01-22 RX ADMIN — DEXMEDETOMIDINE HYDROCHLORIDE 0.3 MCG/KG/HR: 400 INJECTION INTRAVENOUS at 21:29

## 2025-01-22 RX ADMIN — INSULIN ASPART 8 UNITS: 100 INJECTION, SOLUTION INTRAVENOUS; SUBCUTANEOUS at 12:53

## 2025-01-22 RX ADMIN — BUDESONIDE 1 MG: 1 INHALANT ORAL at 19:41

## 2025-01-22 RX ADMIN — TACROLIMUS: 1 OINTMENT TOPICAL at 08:27

## 2025-01-22 RX ADMIN — HEPARIN SODIUM 1550 UNITS/HR: 10000 INJECTION, SOLUTION INTRAVENOUS at 08:23

## 2025-01-22 RX ADMIN — Medication 40 MG: at 07:30

## 2025-01-22 RX ADMIN — Medication 5 ML: at 08:14

## 2025-01-22 RX ADMIN — INSULIN HUMAN 4 UNITS/HR: 1 INJECTION, SOLUTION INTRAVENOUS at 23:36

## 2025-01-22 RX ADMIN — METHYLPREDNISOLONE SODIUM SUCCINATE 40 MG: 40 INJECTION, POWDER, FOR SOLUTION INTRAMUSCULAR; INTRAVENOUS at 05:48

## 2025-01-22 RX ADMIN — CEFEPIME 1 G: 1 INJECTION, POWDER, FOR SOLUTION INTRAMUSCULAR; INTRAVENOUS at 21:27

## 2025-01-22 RX ADMIN — ATORVASTATIN CALCIUM 20 MG: 10 TABLET, FILM COATED ORAL at 20:22

## 2025-01-22 RX ADMIN — DEXMEDETOMIDINE HYDROCHLORIDE 0.4 MCG/KG/HR: 400 INJECTION INTRAVENOUS at 08:35

## 2025-01-22 RX ADMIN — AMIODARONE HYDROCHLORIDE 0.5 MG/MIN: 1.8 INJECTION, SOLUTION INTRAVENOUS at 20:00

## 2025-01-22 RX ADMIN — IPRATROPIUM BROMIDE AND ALBUTEROL SULFATE 3 ML: .5; 3 SOLUTION RESPIRATORY (INHALATION) at 15:22

## 2025-01-22 RX ADMIN — INSULIN ASPART 6 UNITS: 100 INJECTION, SOLUTION INTRAVENOUS; SUBCUTANEOUS at 04:35

## 2025-01-22 RX ADMIN — CHLORHEXIDINE GLUCONATE 15 ML: 1.2 RINSE ORAL at 08:14

## 2025-01-22 RX ADMIN — INSULIN GLARGINE 20 UNITS: 100 INJECTION, SOLUTION SUBCUTANEOUS at 08:19

## 2025-01-22 RX ADMIN — IPRATROPIUM BROMIDE AND ALBUTEROL SULFATE 3 ML: .5; 3 SOLUTION RESPIRATORY (INHALATION) at 19:41

## 2025-01-22 RX ADMIN — TACROLIMUS: 1 OINTMENT TOPICAL at 20:44

## 2025-01-22 RX ADMIN — HEPARIN SODIUM 1550 UNITS/HR: 10000 INJECTION, SOLUTION INTRAVENOUS at 23:36

## 2025-01-22 RX ADMIN — CHLORHEXIDINE GLUCONATE 15 ML: 1.2 RINSE ORAL at 21:36

## 2025-01-22 RX ADMIN — MICONAZOLE NITRATE: 2 POWDER TOPICAL at 20:45

## 2025-01-22 RX ADMIN — CETIRIZINE HYDROCHLORIDE 5 MG: 5 TABLET ORAL at 22:13

## 2025-01-22 RX ADMIN — INSULIN HUMAN 5 UNITS/HR: 1 INJECTION, SOLUTION INTRAVENOUS at 15:25

## 2025-01-22 RX ADMIN — IPRATROPIUM BROMIDE AND ALBUTEROL SULFATE 3 ML: .5; 3 SOLUTION RESPIRATORY (INHALATION) at 07:22

## 2025-01-22 ASSESSMENT — ACTIVITIES OF DAILY LIVING (ADL)
ADLS_ACUITY_SCORE: 89

## 2025-01-22 NOTE — PLAN OF CARE
Problem: Adult Inpatient Plan of Care  Goal: Plan of Care Review  Description: The Plan of Care Review/Shift note should be completed every shift.  The Outcome Evaluation is a brief statement about your assessment that the patient is improving, declining, or no change.  This information will be displayed automatically on your shift  note.  Outcome: Not Progressing  Flowsheets (Taken 1/22/2025 0507)  Outcome Evaluation: restarted precedex at 1930-pt very anxious, tearful and shaking head no repeatedly to daughter leaving. fentanyl administered twice for pain. tube feed at goal and tolerating. BG remains high despite correctional sliding scale.  Plan of Care Reviewed With: patient  Overall Patient Progress: no change     Problem: Pain Acute  Goal: Optimal Pain Control and Function  Outcome: Progressing  Intervention: Prevent or Manage Pain  Recent Flowsheet Documentation  Taken 1/22/2025 0000 by Laura Mercado, RN  Sensory Stimulation Regulation:   care clustered   lighting decreased   quiet environment promoted   visual stimulation minimized  Medication Review/Management: medications reviewed  Taken 1/21/2025 2000 by Laura Mercado, RN  Sensory Stimulation Regulation:   care clustered   lighting decreased   quiet environment promoted   visual stimulation minimized  Medication Review/Management: medications reviewed

## 2025-01-22 NOTE — PLAN OF CARE
"Patient here due to respiratory failure. Mechanically ventilated FIO2 40% PEEP 8. Alert and able to follow commands. Tele shows atrial fibrillation with RVR. Lung sounds diminished. Patient had dialysis today and was only able to have 1L off due to hypotension and worsening atrial fibrillation with RVR. MA tylenol given once for pain. Weaned from precedex. Tube feeding started this afternoon. Family at bedside throughout the day and updated. Currently infusing amiodarone, levophed, heparin (recheck in the am).  Problem: Adult Inpatient Plan of Care  Goal: Plan of Care Review  Description: The Plan of Care Review/Shift note should be completed every shift.  The Outcome Evaluation is a brief statement about your assessment that the patient is improving, declining, or no change.  This information will be displayed automatically on your shift  note.  Outcome: Progressing  Goal: Patient-Specific Goal (Individualized)  Description: You can add care plan individualizations to a care plan. Examples of Individualization might be:  \"Parent requests to be called daily at 9am for status\", \"I have a hard time hearing out of my right ear\", or \"Do not touch me to wake me up as it startles  me\".  Outcome: Progressing  Goal: Absence of Hospital-Acquired Illness or Injury  Outcome: Progressing  Intervention: Identify and Manage Fall Risk  Recent Flowsheet Documentation  Taken 1/21/2025 1600 by Lyndon Jacobs, RN  Safety Promotion/Fall Prevention:   clutter free environment maintained   increased rounding and observation   increase visualization of patient   room door open   room near nurse's station   safety round/check completed   room organization consistent   supervised activity   treat reversible contributory factors   treat underlying cause  Taken 1/21/2025 1200 by Lyndon Jacobs, RN  Safety Promotion/Fall Prevention:   clutter free environment maintained   increased rounding and observation   increase visualization of patient   " room door open   room near nurse's station   safety round/check completed   room organization consistent   supervised activity   treat reversible contributory factors   treat underlying cause  Taken 1/21/2025 0800 by Lyndon Jacobs RN  Safety Promotion/Fall Prevention:   clutter free environment maintained   increased rounding and observation   increase visualization of patient   room door open   room near nurse's station   safety round/check completed   room organization consistent   supervised activity   treat reversible contributory factors   treat underlying cause  Intervention: Prevent Skin Injury  Recent Flowsheet Documentation  Taken 1/21/2025 1800 by Lyndon Jacobs RN  Body Position:   turned   right   heels elevated   upper extremity elevated  Taken 1/21/2025 1600 by Lyndon Jacobs RN  Body Position:   turned   left   heels elevated   upper extremity elevated  Taken 1/21/2025 1400 by Lyndon Jacobs RN  Body Position:   turned   right   heels elevated   upper extremity elevated  Taken 1/21/2025 1200 by Lyndon Jacobs RN  Body Position:   turned   left   heels elevated   upper extremity elevated  Taken 1/21/2025 1000 by Lyndon Jacobs RN  Body Position:   turned   right   heels elevated   upper extremity elevated  Taken 1/21/2025 0800 by Lyndon Jacobs RN  Body Position:   turned   left   heels elevated   upper extremity elevated  Intervention: Prevent and Manage VTE (Venous Thromboembolism) Risk  Recent Flowsheet Documentation  Taken 1/21/2025 1600 by Lyndon Jacobs RN  VTE Prevention/Management: SCDs off (sequential compression devices)  Taken 1/21/2025 1200 by Lyndon Jacobs RN  VTE Prevention/Management: SCDs off (sequential compression devices)  Taken 1/21/2025 0800 by Lyndon Jacobs RN  VTE Prevention/Management: SCDs off (sequential compression devices)  Goal: Optimal Comfort and Wellbeing  Outcome: Progressing  Intervention: Provide Person-Centered Care  Recent Flowsheet Documentation  Taken 1/21/2025  1600 by Lyndon Jacobs RN  Trust Relationship/Rapport:   care explained   choices provided   emotional support provided   reassurance provided  Taken 1/21/2025 1200 by Lyndon Jacobs RN  Trust Relationship/Rapport:   care explained   choices provided   emotional support provided   reassurance provided  Taken 1/21/2025 0800 by Lyndon Jacobs RN  Trust Relationship/Rapport:   care explained   choices provided   emotional support provided   reassurance provided  Goal: Readiness for Transition of Care  Outcome: Progressing     Problem: Fall Injury Risk  Goal: Absence of Fall and Fall-Related Injury  Outcome: Progressing  Intervention: Identify and Manage Contributors  Recent Flowsheet Documentation  Taken 1/21/2025 1600 by Lyndon Jacobs RN  Medication Review/Management: medications reviewed  Taken 1/21/2025 1200 by Lyndon Jacobs RN  Medication Review/Management: medications reviewed  Taken 1/21/2025 0800 by Lyndon Jacobs RN  Medication Review/Management: medications reviewed  Intervention: Promote Injury-Free Environment  Recent Flowsheet Documentation  Taken 1/21/2025 1600 by Lyndon Jacobs RN  Safety Promotion/Fall Prevention:   clutter free environment maintained   increased rounding and observation   increase visualization of patient   room door open   room near nurse's station   safety round/check completed   room organization consistent   supervised activity   treat reversible contributory factors   treat underlying cause  Taken 1/21/2025 1200 by Lyndon Jacobs RN  Safety Promotion/Fall Prevention:   clutter free environment maintained   increased rounding and observation   increase visualization of patient   room door open   room near nurse's station   safety round/check completed   room organization consistent   supervised activity   treat reversible contributory factors   treat underlying cause  Taken 1/21/2025 0800 by Lyndon Jacobs RN  Safety Promotion/Fall Prevention:   clutter free environment  maintained   increased rounding and observation   increase visualization of patient   room door open   room near nurse's station   safety round/check completed   room organization consistent   supervised activity   treat reversible contributory factors   treat underlying cause     Problem: Pain Acute  Goal: Optimal Pain Control and Function  Outcome: Progressing  Intervention: Optimize Psychosocial Wellbeing  Recent Flowsheet Documentation  Taken 1/21/2025 1600 by Lyndon Jacobs RN  Supportive Measures:   positive reinforcement provided   relaxation techniques promoted  Taken 1/21/2025 1200 by Lyndon Jacobs RN  Supportive Measures:   positive reinforcement provided   relaxation techniques promoted  Taken 1/21/2025 0800 by Lyndon Jacobs RN  Supportive Measures:   positive reinforcement provided   relaxation techniques promoted  Intervention: Prevent or Manage Pain  Recent Flowsheet Documentation  Taken 1/21/2025 1600 by Lyndon Jacobs RN  Sensory Stimulation Regulation:   care clustered   lighting decreased   music on  Medication Review/Management: medications reviewed  Taken 1/21/2025 1200 by Lyndon Jacobs RN  Sensory Stimulation Regulation:   care clustered   lighting decreased   music on  Medication Review/Management: medications reviewed  Taken 1/21/2025 0800 by Lyndon Jacobs RN  Sensory Stimulation Regulation:   care clustered   lighting decreased   music on  Medication Review/Management: medications reviewed     Problem: Gas Exchange Impaired  Goal: Optimal Gas Exchange  Outcome: Progressing  Intervention: Optimize Oxygenation and Ventilation  Recent Flowsheet Documentation  Taken 1/21/2025 1800 by Lyndon Jacobs RN  Head of Bed ([facility]) Positioning: HOB at 30 degrees  Taken 1/21/2025 1600 by Lyndon Jacobs RN  Head of Bed ([facility]) Positioning: HOB at 30 degrees  Taken 1/21/2025 1400 by Lyndon Jacobs RN  Head of Bed ([facility]) Positioning: HOB at 30 degrees  Taken 1/21/2025 1200 by Lnydon Jacobs RN  Head of Bed  (HOB) Positioning: HOB at 30 degrees  Taken 1/21/2025 1000 by Lyndon Jacobs RN  Head of Bed (HOB) Positioning: HOB at 30 degrees  Taken 1/21/2025 0800 by Lyndon Jacobs RN  Head of Bed (HOB) Positioning: HOB at 30 degrees     Problem: Risk for Delirium  Goal: Optimal Coping  Outcome: Progressing  Intervention: Optimize Psychosocial Adjustment to Delirium  Recent Flowsheet Documentation  Taken 1/21/2025 1600 by Lyndon Jacobs RN  Supportive Measures:   positive reinforcement provided   relaxation techniques promoted  Family/Support System Care: support provided  Taken 1/21/2025 1200 by Lyndon Jacobs RN  Supportive Measures:   positive reinforcement provided   relaxation techniques promoted  Family/Support System Care: support provided  Taken 1/21/2025 0800 by Lyndon Jacobs RN  Supportive Measures:   positive reinforcement provided   relaxation techniques promoted  Goal: Improved Behavioral Control  Outcome: Progressing  Intervention: Minimize Safety Risk  Recent Flowsheet Documentation  Taken 1/21/2025 1600 by Lyndon Jacobs RN  Enhanced Safety Measures:   room near unit station   review medications for side effects with activity   patient/family teach back on injury risk   pain management   monitor patients coagulation values   family to remain at bedside   assistive devices when indicated  Trust Relationship/Rapport:   care explained   choices provided   emotional support provided   reassurance provided  Taken 1/21/2025 1200 by Lyndon Jacobs RN  Enhanced Safety Measures:   room near unit station   review medications for side effects with activity   patient/family teach back on injury risk   pain management   monitor patients coagulation values   family to remain at bedside   assistive devices when indicated  Trust Relationship/Rapport:   care explained   choices provided   emotional support provided   reassurance provided  Taken 1/21/2025 0800 by Lyndon Jacobs RN  Enhanced Safety Measures:   room near unit  station   review medications for side effects with activity   patient/family teach back on injury risk   pain management   monitor patients coagulation values   family to remain at bedside   assistive devices when indicated  Trust Relationship/Rapport:   care explained   choices provided   emotional support provided   reassurance provided  Goal: Improved Attention and Thought Clarity  Outcome: Progressing  Intervention: Maximize Cognitive Function  Recent Flowsheet Documentation  Taken 1/21/2025 1600 by Lyndon Jacobs, RN  Sensory Stimulation Regulation:   care clustered   lighting decreased   music on  Reorientation Measures:   reorientation provided   clock in view   calendar in view  Taken 1/21/2025 1200 by Lyndon Jacobs, RN  Sensory Stimulation Regulation:   care clustered   lighting decreased   music on  Reorientation Measures:   reorientation provided   clock in view   calendar in view  Taken 1/21/2025 0800 by Lyndon Jacobs, RN  Sensory Stimulation Regulation:   care clustered   lighting decreased   music on  Reorientation Measures:   reorientation provided   clock in view   calendar in view  Goal: Improved Sleep  Outcome: Progressing

## 2025-01-22 NOTE — PROGRESS NOTES
ICU Progress Note    Date of Service: 01/21/25    A/P:  74 year old female with PMHx significant for ESRD on HD, CHF, COPD, poor Mobility (L AKA, Obesity, WC bound), DM type II, hypertension.  She presented to the ER on 1/8/25 complaining of shortness of breath and was diagnosed with Influenza A. On 1/9/25 she was transferred to the ICU and intubated due to worsening acute on chronic respiratory failure. On the evening of 1/18/25 she was extubated but unfortunately she was reintubated on 1/20.     I have personally reviewed the daily labs, imaging studies, cultures and discussed the case with referring physician and consulting physicians.     Neuro  Intubated  Likely baseline cognitive impairment  - Precedex for sedation for mechanical ventilation  - PTA Zoloft, gabapentin    Pulmonary  Acute hypoxemic respiratory failure  Influenza A  COPD exacerbation  Hx of JONE  ? Hospital acquired pneumonia  - Continue methylpred 40mg q24 through 1/23  - Continue nebs  - Cont pulmicort nebs BID  - iHD last on 1/21  - Broad spectrum antibiotics started 1/20 following bronch, gram stain with GPCs and GNRs    Cardiac  Shock, likely due to sedation  - MAP goal > 65, currently on low dose norepi  - Transition amio gtt to PO  - IV heparin gtt    Renal  ESRD on HD  - Nephrology consult, appreciate recs  - iHD planned today  - Overall appears fluid overloaded, can use low dose pressors to help with fluid removal    GI  No active issues  - RD to manage TFs    Heme/Onc  Chronic anemia  Hx of Afib  - monitor CBC  - Transfuse for Hgb < 7  - Heparin gtt    ID  Influenza A pneumona  ? Hospital acquired pneumonia  - Tamiflu treatment completed  - Cefepime/Vanc restarted 1/20 after bronch  - Sputum gram stain with GPCs and GNRs    Endo  Hx of DM  - monitor BG  - sliding scale insulin    Clinically Significant Risk Factors         # Hyponatremia: Lowest Na = 129 mmol/L in last 2 days, will monitor as appropriate  # Hypochloremia: Lowest Cl = 87  "mmol/L in last 2 days, will monitor as appropriate     # Anion Gap Metabolic Acidosis: Highest Anion Gap = 20 mmol/L in last 2 days, will monitor and treat as appropriate  # Hypoalbuminemia: Lowest albumin = 2.8 g/dL at 1/13/2025  5:14 AM, will monitor as appropriate     # Hypertension: Noted on problem list  # Chronic heart failure with preserved ejection fraction: heart failure noted on problem list and last echo with EF >50%    # Acute Hypercapnic Respiratory Failure: based on venous blood gas results.  Continue supplemental oxygen and ventilatory support as indicated.        # DMII: A1C = 6.5 % (Ref range: <5.7 %) within past 6 months   # Obesity: Estimated body mass index is 33.15 kg/m  as calculated from the following:    Height as of this encounter: 1.702 m (5' 7\").    Weight as of this encounter: 96 kg (211 lb 10.3 oz).        # Financial/Environmental Concerns: none            PPX  1. DVT: heparin gtt   2. VAP: HOB 30 degrees, chlorhexidine rinse  3. Stress Ulcer: PPI  4. Restraints: Nonviolent soft two point restraints required and necessary for patient safety and continued cares and good effect as patient continues to pull at necessary lines, tubes despite education and distraction. Will readdress daily.   5. Wound care - per unit routine   6. Feeding - TF  7. Family updated at bedside.    Interval Hx:  Wakes up and following commands.     Unable to obtain ROS 2/2 sedation/intubation.    /56   Pulse 91   Temp 97.6  F (36.4  C) (Axillary)   Resp 14   Ht 1.702 m (5' 7\")   Wt 96 kg (211 lb 10.3 oz)   LMP  (LMP Unknown)   SpO2 99%   BMI 33.15 kg/m    Gen: supine, NAD  Neuro: sedated, pupils equal  HEENT: anicteric  Card: Afib on monitor, regular rate  Pulm: Intubated, equal chest rise  Abd: soft, non-distended  MSK: no edema, no acute joint abnormality. Left BKA  Skin: no obvious rash    FiO2 (%): 30 %, Resp: 14, Vent Mode: CMV/AC, Resp Rate (Set): 12 breaths/min, Tidal Volume (Set, mL): 400 mL, " PEEP (cm H2O): 8 cmH2O, Resp Rate (Set): 12 breaths/min, Tidal Volume (Set, mL): 400 mL, PEEP (cm H2O): 8 cmH2O      Intake/Output Summary (Last 24 hours) at 1/21/2025 0839  Last data filed at 1/21/2025 0600  Gross per 24 hour   Intake 1083.99 ml   Output 2000 ml   Net -916.01 ml       Labs: reviewed    Imaging: reviewed    Patient seen and findings/plan discussed with medical ICU staff, Dr. Watt.    I spent a total of 39 minutes (excluding procedure time) personally providing and directing critical care services at the bedside and on the critical care unit for Supriya Proctor PA-C on 1/22/2025 at 3:04 PM

## 2025-01-22 NOTE — PROGRESS NOTES
Renal Medicine       Following for ESRD management/dialysis dependence  TTS outpatient schedule     Recent runs:    01.20.25  UF = 3 liter  01.21.25  UF = 1 liter limited hypotension    Remains intubated  Plan dialysis 01.23.25  UF as tolerated         Recent Labs   Lab 01/22/25  0556 01/21/25  0545   POTASSIUM 3.7 4.7           BARBI Hawley    [facility]  [phone]

## 2025-01-22 NOTE — PROGRESS NOTES
Care Management Follow Up    Length of Stay (days): 14    Expected Discharge Date: 01/29/2025     Concerns to be Addressed: other (see comments) (elevated risk)     Patient plan of care discussed at interdisciplinary rounds: Yes    Anticipated Discharge Disposition: TBD              Anticipated Discharge Services: None  Anticipated Discharge DME: None    Patient/family educated on Medicare website which has current facility and service quality ratings: no  Education Provided on the Discharge Plan: No  Patient/Family in Agreement with the Plan: yes    Referrals Placed by CM/SW:    Private pay costs discussed: Not applicable    Discussed  Partnership in Safe Discharge Planning  document with patient/family: No     Handoff Completed: Yes, [facility] PCP: Internal handoff referral completed    Additional Information:  Patient remains intubated.      Next Steps: Care Management will continue to follow for discharge planning.     SHANTA Salcedo RN, BSN, OCN   Inpatient Care Coordination ICU  [facility]  Office: [phone]

## 2025-01-22 NOTE — PROGRESS NOTES
[facility] ICU RESPIRATORY NOTE           Date of Admission: 1/8/2025     Date of Intubation (most recent): 1/20/2025     Reason for Mechanical Ventilation: Respiratory Failure     Number of Days on Mechanical Ventilation: Day 3 since re-intubation     Met Criteria for Spontaneous Breathing Trial: Yes     Bite Block: No     Significant Events Today: None overnight    Recent Labs   Lab 01/20/25  1541 01/20/25  0830 01/17/25  1213 01/16/25  0901   O2PER 50 5 30 30     FiO2 (%): 30 %, Resp: 18, Vent Mode: CMV/AC, Resp Rate (Set): 12 breaths/min, Tidal Volume (Set, mL): 400 mL, PEEP (cm H2O): 8 cmH2O, Resp Rate (Set): 12 breaths/min, Tidal Volume (Set, mL): 400 mL, PEEP (cm H2O): 8 cmH2O    LISA Giraldo, RRT

## 2025-01-22 NOTE — PROGRESS NOTES
[facility] ICU RESPIRATORY NOTE        Date of Admission: 1/8/2025    Date of Intubation (most recent): 1/20/2025    Reason for Mechanical Ventilation: Respiratory failure    Number of Days on Mechanical Ventilation: Day 3 since reintubation.     Met Criteria for Spontaneous Breathing Trial: Yes     Bite Block: No    Significant Events Today: PSV 8/5 this shift for 1 and 1/2 hours.       ABG Results:   Recent Labs   Lab 01/20/25  1541 01/20/25  0830 01/17/25  1213 01/16/25  0901   O2PER 50 5 30 30         Current Vent Settings: FiO2 (%): 30 %, Resp: 19, Vent Mode: (S) CMV/AC, Resp Rate (Set): 12 breaths/min, Tidal Volume (Set, mL): 400 mL, PEEP (cm H2O): 8 cmH2O, Resp Rate (Set): 12 breaths/min, Tidal Volume (Set, mL): 400 mL, PEEP (cm H2O): 8 cmH2O    Skin Assessment: Skin integrity is good. No issues.     Plan: Continue full ventilator support for tonight. Assess to wean with possible extubation tomorrow.     Kerri Ambrosio, RT on 1/22/2025 at 5:53 PM

## 2025-01-22 NOTE — PHARMACY-VANCOMYCIN DOSING SERVICE
Pharmacy Vancomycin Note  Date of Service 2025  Patient's  1950   75 year old, female    Indication: Healthcare-Associated Pneumonia  Day of Therapy: 1  Current vancomycin regimen:  Intermittent.  Most recent dose was 2000 mg given this AM at 05:54  Current vancomycin monitoring method: Renal Replacement Therapy  Current vancomycin therapeutic monitoring goal: 15-20 mg/L        Current estimated CrCl = Estimated Creatinine Clearance: 14.8 mL/min (A) (based on SCr of 3.86 mg/dL (H)).    Creatinine for last 3 days  2025:  5:14 AM Creatinine 2.90 mg/dL; 11:19 PM Creatinine 4.05 mg/dL  2025:  5:39 AM Creatinine 4.27 mg/dL  2025:  5:45 AM Creatinine 3.86 mg/dL    Recent Vancomycin Levels (past 3 days)  2025:  8:21 PM Vancomycin 20.4 ug/mL    Vancomycin IV Administrations (past 72 hours)                     vancomycin (VANCOCIN) 2,000 mg in 0.9% NaCl 520 mL intermittent infusion (mg) 2,000 mg New Bag 25 0554                    Nephrotoxins and other renal medications (From now, onward)      Start     Dose/Rate Route Frequency Ordered Stop    25 0355  vancomycin place cardoso - receiving intermittent dosing         1 each Intravenous SEE ADMIN INSTRUCTIONS 25 0356      25 1430  norepinephrine (LEVOPHED) 4 mg in  mL infusion PREMIX         0.01-0.6 mcg/kg/min × 100.4 kg  3.8-225.9 mL/hr  Intravenous CONTINUOUS 25 1418      25 1230  [Held by provider]  furosemide (LASIX) tablet 80 mg        (On hold since yesterday at 1418 until manually unheld; held by Truman Moreland MDHold Reason: Other)   Note to Pharmacy: Patient taking differently: Take 40 mg in a.m. and 80 mg in p.m. on dialysis days and 80 mg twice daily on non dialysis days    80 mg Oral or NG Tube 2 TIMES DAILY (Diuretics and Nitrates) 25 1214                 Contrast Orders - past 72 hours (72h ago, onward)      Start     Dose/Rate Route Frequency Stop    25 1500   perflutren diluted in saline (DEFINITY) injection 2 mL        Note to Pharmacy: NDC# [id number]    2 mL Intravenous ONCE 01/21/25 1443            Interpretation of levels and current regimen:  Vancomycin level is   appropriate for redosing    Renal Function: ESRD on Dialysis      Plan:  Redose 500 mg IV x1  Vancomycin monitoring method: Renal Replacement Therapy  Vancomycin therapeutic monitoring goal: 15-20 mg/L  Pharmacy will check vancomycin levels as appropriate in  Prior to next dialysis run .    Dusty Ramos, [facility]

## 2025-01-23 ENCOUNTER — APPOINTMENT (OUTPATIENT)
Dept: CT IMAGING | Facility: CLINIC | Age: 75
DRG: 207 | End: 2025-01-23
Attending: INTERNAL MEDICINE
Payer: COMMERCIAL

## 2025-01-23 ENCOUNTER — APPOINTMENT (OUTPATIENT)
Dept: PHYSICAL THERAPY | Facility: CLINIC | Age: 75
End: 2025-01-23
Payer: COMMERCIAL

## 2025-01-23 ENCOUNTER — APPOINTMENT (OUTPATIENT)
Dept: GENERAL RADIOLOGY | Facility: CLINIC | Age: 75
DRG: 207 | End: 2025-01-23
Attending: INTERNAL MEDICINE
Payer: COMMERCIAL

## 2025-01-23 VITALS
OXYGEN SATURATION: 97 % | HEART RATE: 96 BPM | SYSTOLIC BLOOD PRESSURE: 126 MMHG | HEIGHT: 67 IN | WEIGHT: 222.66 LBS | BODY MASS INDEX: 34.95 KG/M2 | RESPIRATION RATE: 15 BRPM | DIASTOLIC BLOOD PRESSURE: 53 MMHG | TEMPERATURE: 99.5 F

## 2025-01-23 LAB
ANION GAP SERPL CALCULATED.3IONS-SCNC: 15 MMOL/L (ref 7–15)
BACTERIA BRONCH: ABNORMAL
BACTERIA BRONCH: NORMAL
BACTERIA BRONCH: NORMAL
BACTERIA PLR CULT: NORMAL
BUN SERPL-MCNC: 74.5 MG/DL (ref 8–23)
CALCIUM SERPL-MCNC: 10.1 MG/DL (ref 8.8–10.4)
CHLORIDE SERPL-SCNC: 94 MMOL/L (ref 98–107)
CREAT SERPL-MCNC: 4.19 MG/DL (ref 0.51–0.95)
EGFRCR SERPLBLD CKD-EPI 2021: 10 ML/MIN/1.73M2
ERYTHROCYTE [DISTWIDTH] IN BLOOD BY AUTOMATED COUNT: 18.2 % (ref 10–15)
GLUCOSE BLDC GLUCOMTR-MCNC: 101 MG/DL (ref 70–99)
GLUCOSE BLDC GLUCOMTR-MCNC: 103 MG/DL (ref 70–99)
GLUCOSE BLDC GLUCOMTR-MCNC: 106 MG/DL (ref 70–99)
GLUCOSE BLDC GLUCOMTR-MCNC: 130 MG/DL (ref 70–99)
GLUCOSE BLDC GLUCOMTR-MCNC: 133 MG/DL (ref 70–99)
GLUCOSE BLDC GLUCOMTR-MCNC: 133 MG/DL (ref 70–99)
GLUCOSE BLDC GLUCOMTR-MCNC: 135 MG/DL (ref 70–99)
GLUCOSE BLDC GLUCOMTR-MCNC: 136 MG/DL (ref 70–99)
GLUCOSE BLDC GLUCOMTR-MCNC: 141 MG/DL (ref 70–99)
GLUCOSE BLDC GLUCOMTR-MCNC: 151 MG/DL (ref 70–99)
GLUCOSE BLDC GLUCOMTR-MCNC: 154 MG/DL (ref 70–99)
GLUCOSE BLDC GLUCOMTR-MCNC: 189 MG/DL (ref 70–99)
GLUCOSE BLDC GLUCOMTR-MCNC: 194 MG/DL (ref 70–99)
GLUCOSE SERPL-MCNC: 138 MG/DL (ref 70–99)
GRAM STAIN RESULT: ABNORMAL
GRAM STAIN RESULT: NORMAL
GRAM STAIN RESULT: NORMAL
HCO3 SERPL-SCNC: 25 MMOL/L (ref 22–29)
HCT VFR BLD AUTO: 24.9 % (ref 35–47)
HGB BLD-MCNC: 8 G/DL (ref 11.7–15.7)
MCH RBC QN AUTO: 32.3 PG (ref 26.5–33)
MCHC RBC AUTO-ENTMCNC: 32.1 G/DL (ref 31.5–36.5)
MCV RBC AUTO: 100 FL (ref 78–100)
MRSA DNA SPEC QL NAA+PROBE: NEGATIVE
P JIROVECII DNA SPEC QL NAA+PROBE: ABNORMAL
PLATELET # BLD AUTO: 193 10E3/UL (ref 150–450)
POTASSIUM SERPL-SCNC: 3.9 MMOL/L (ref 3.4–5.3)
RBC # BLD AUTO: 2.48 10E6/UL (ref 3.8–5.2)
SA TARGET DNA: NEGATIVE
SCANNED LAB RESULT: NORMAL
SODIUM SERPL-SCNC: 134 MMOL/L (ref 135–145)
WBC # BLD AUTO: 6.3 10E3/UL (ref 4–11)

## 2025-01-23 PROCEDURE — 250N000009 HC RX 250: Performed by: INTERNAL MEDICINE

## 2025-01-23 PROCEDURE — 250N000011 HC RX IP 250 OP 636: Performed by: INTERNAL MEDICINE

## 2025-01-23 PROCEDURE — 999N000253 HC STATISTIC WEANING TRIALS

## 2025-01-23 PROCEDURE — 87640 STAPH A DNA AMP PROBE: CPT | Performed by: INTERNAL MEDICINE

## 2025-01-23 PROCEDURE — 71045 X-RAY EXAM CHEST 1 VIEW: CPT

## 2025-01-23 PROCEDURE — 90935 HEMODIALYSIS ONE EVALUATION: CPT

## 2025-01-23 PROCEDURE — 71250 CT THORAX DX C-: CPT

## 2025-01-23 PROCEDURE — 94640 AIRWAY INHALATION TREATMENT: CPT

## 2025-01-23 PROCEDURE — 80048 BASIC METABOLIC PNL TOTAL CA: CPT | Performed by: STUDENT IN AN ORGANIZED HEALTH CARE EDUCATION/TRAINING PROGRAM

## 2025-01-23 PROCEDURE — P9045 ALBUMIN (HUMAN), 5%, 250 ML: HCPCS | Performed by: INTERNAL MEDICINE

## 2025-01-23 PROCEDURE — 85014 HEMATOCRIT: CPT | Performed by: STUDENT IN AN ORGANIZED HEALTH CARE EDUCATION/TRAINING PROGRAM

## 2025-01-23 PROCEDURE — 258N000003 HC RX IP 258 OP 636: Performed by: INTERNAL MEDICINE

## 2025-01-23 PROCEDURE — 250N000009 HC RX 250: Performed by: STUDENT IN AN ORGANIZED HEALTH CARE EDUCATION/TRAINING PROGRAM

## 2025-01-23 PROCEDURE — 99291 CRITICAL CARE FIRST HOUR: CPT | Performed by: INTERNAL MEDICINE

## 2025-01-23 PROCEDURE — 90935 HEMODIALYSIS ONE EVALUATION: CPT | Performed by: INTERNAL MEDICINE

## 2025-01-23 PROCEDURE — 200N000001 HC R&B ICU

## 2025-01-23 PROCEDURE — 250N000013 HC RX MED GY IP 250 OP 250 PS 637: Performed by: INTERNAL MEDICINE

## 2025-01-23 PROCEDURE — 999N000254 HC STATISTIC VENTILATOR TRANSFER

## 2025-01-23 PROCEDURE — 87641 MR-STAPH DNA AMP PROBE: CPT | Performed by: INTERNAL MEDICINE

## 2025-01-23 PROCEDURE — 250N000011 HC RX IP 250 OP 636

## 2025-01-23 PROCEDURE — 94640 AIRWAY INHALATION TREATMENT: CPT | Mod: 76

## 2025-01-23 PROCEDURE — 120N000004 HC R&B MS OVERFLOW

## 2025-01-23 PROCEDURE — 999N000157 HC STATISTIC RCP TIME EA 10 MIN

## 2025-01-23 PROCEDURE — 250N000013 HC RX MED GY IP 250 OP 250 PS 637: Performed by: STUDENT IN AN ORGANIZED HEALTH CARE EDUCATION/TRAINING PROGRAM

## 2025-01-23 PROCEDURE — 97110 THERAPEUTIC EXERCISES: CPT | Mod: GP

## 2025-01-23 PROCEDURE — 250N000011 HC RX IP 250 OP 636: Performed by: STUDENT IN AN ORGANIZED HEALTH CARE EDUCATION/TRAINING PROGRAM

## 2025-01-23 PROCEDURE — 94003 VENT MGMT INPAT SUBQ DAY: CPT

## 2025-01-23 RX ORDER — CEFAZOLIN SODIUM 1 G/50ML
750 SOLUTION INTRAVENOUS ONCE
Status: COMPLETED | OUTPATIENT
Start: 2025-01-23 | End: 2025-01-23

## 2025-01-23 RX ADMIN — Medication 50 MCG: at 07:43

## 2025-01-23 RX ADMIN — CEFEPIME 1 G: 1 INJECTION, POWDER, FOR SOLUTION INTRAMUSCULAR; INTRAVENOUS at 21:51

## 2025-01-23 RX ADMIN — AMIODARONE HYDROCHLORIDE 0.5 MG/MIN: 1.8 INJECTION, SOLUTION INTRAVENOUS at 18:09

## 2025-01-23 RX ADMIN — BUDESONIDE 1 MG: 1 INHALANT ORAL at 20:16

## 2025-01-23 RX ADMIN — CETIRIZINE HYDROCHLORIDE 5 MG: 5 TABLET ORAL at 21:51

## 2025-01-23 RX ADMIN — ATORVASTATIN CALCIUM 20 MG: 10 TABLET, FILM COATED ORAL at 20:09

## 2025-01-23 RX ADMIN — IPRATROPIUM BROMIDE AND ALBUTEROL SULFATE 3 ML: .5; 3 SOLUTION RESPIRATORY (INHALATION) at 10:43

## 2025-01-23 RX ADMIN — CHLORHEXIDINE GLUCONATE 15 ML: 1.2 RINSE ORAL at 20:09

## 2025-01-23 RX ADMIN — ALBUMIN HUMAN 250 ML: 0.05 INJECTION, SOLUTION INTRAVENOUS at 11:26

## 2025-01-23 RX ADMIN — SODIUM CHLORIDE 500 ML: 9 INJECTION, SOLUTION INTRAVENOUS at 11:27

## 2025-01-23 RX ADMIN — VANCOMYCIN HYDROCHLORIDE 750 MG: 1 INJECTION, POWDER, LYOPHILIZED, FOR SOLUTION INTRAVENOUS at 16:10

## 2025-01-23 RX ADMIN — TACROLIMUS: 1 OINTMENT TOPICAL at 20:11

## 2025-01-23 RX ADMIN — MICONAZOLE NITRATE: 2 POWDER TOPICAL at 20:09

## 2025-01-23 RX ADMIN — Medication 5 ML: at 16:17

## 2025-01-23 RX ADMIN — BUDESONIDE 1 MG: 1 INHALANT ORAL at 07:27

## 2025-01-23 RX ADMIN — AMIODARONE HYDROCHLORIDE 0.5 MG/MIN: 1.8 INJECTION, SOLUTION INTRAVENOUS at 05:40

## 2025-01-23 RX ADMIN — CHLORHEXIDINE GLUCONATE 15 ML: 1.2 RINSE ORAL at 09:27

## 2025-01-23 RX ADMIN — TACROLIMUS: 1 OINTMENT TOPICAL at 09:27

## 2025-01-23 RX ADMIN — IPRATROPIUM BROMIDE AND ALBUTEROL SULFATE 3 ML: .5; 3 SOLUTION RESPIRATORY (INHALATION) at 20:16

## 2025-01-23 RX ADMIN — HEPARIN SODIUM 1550 UNITS/HR: 10000 INJECTION, SOLUTION INTRAVENOUS at 16:35

## 2025-01-23 RX ADMIN — SERTRALINE HYDROCHLORIDE 75 MG: 50 TABLET ORAL at 20:09

## 2025-01-23 RX ADMIN — IPRATROPIUM BROMIDE AND ALBUTEROL SULFATE 3 ML: .5; 3 SOLUTION RESPIRATORY (INHALATION) at 07:27

## 2025-01-23 RX ADMIN — IPRATROPIUM BROMIDE AND ALBUTEROL SULFATE 3 ML: .5; 3 SOLUTION RESPIRATORY (INHALATION) at 15:44

## 2025-01-23 RX ADMIN — MICONAZOLE NITRATE: 2 POWDER TOPICAL at 09:27

## 2025-01-23 RX ADMIN — Medication 40 MG: at 07:43

## 2025-01-23 RX ADMIN — DEXMEDETOMIDINE HYDROCHLORIDE 0.2 MCG/KG/HR: 400 INJECTION INTRAVENOUS at 11:48

## 2025-01-23 ASSESSMENT — ACTIVITIES OF DAILY LIVING (ADL)
ADLS_ACUITY_SCORE: 89
ADLS_ACUITY_SCORE: 85
ADLS_ACUITY_SCORE: 89
ADLS_ACUITY_SCORE: 85
ADLS_ACUITY_SCORE: 89

## 2025-01-23 NOTE — PLAN OF CARE
Problem: Adult Inpatient Plan of Care  Goal: Plan of Care Review  Description: The Plan of Care Review/Shift note should be completed every shift.  The Outcome Evaluation is a brief statement about your assessment that the patient is improving, declining, or no change.  This information will be displayed automatically on your shift  note.  Outcome: Progressing  Flowsheets (Taken 1/23/2025 0555)  Outcome Evaluation: BP WDL, remains in a fib but rate controlled on amio gtt. following commands, on dex, denies pain. insulin gtt at alg 3 and within goal range.  Plan of Care Reviewed With: patient  Overall Patient Progress: no change     Problem: Pain Acute  Goal: Optimal Pain Control and Function  Outcome: Progressing  Intervention: Prevent or Manage Pain  Recent Flowsheet Documentation  Taken 1/23/2025 0000 by Laura Mercado RN  Sensory Stimulation Regulation:   care clustered   lighting decreased   quiet environment promoted   visual stimulation minimized  Medication Review/Management: medications reviewed  Taken 1/22/2025 2000 by Laura Mercado RN  Sensory Stimulation Regulation:   care clustered   lighting decreased   quiet environment promoted   visual stimulation minimized  Medication Review/Management: medications reviewed     Problem: Adult Inpatient Plan of Care  Goal: Absence of Hospital-Acquired Illness or Injury  Intervention: Identify and Manage Fall Risk  Recent Flowsheet Documentation  Taken 1/23/2025 0000 by Laura Mercado, RN  Safety Promotion/Fall Prevention:   clutter free environment maintained   increased rounding and observation   increase visualization of patient   room door open   room near nurse's station   safety round/check completed   room organization consistent   supervised activity   treat reversible contributory factors   treat underlying cause  Taken 1/22/2025 2000 by Laura Mercado, RN  Safety Promotion/Fall Prevention:   clutter free environment maintained    increased rounding and observation   increase visualization of patient   room door open   room near nurse's station   safety round/check completed   room organization consistent   supervised activity   treat reversible contributory factors   treat underlying cause  Intervention: Prevent Skin Injury  Recent Flowsheet Documentation  Taken 1/23/2025 0400 by Laura Mercado RN  Body Position:   turned   heels elevated   tilted  Taken 1/23/2025 0200 by Laura Mercado RN  Body Position:   turned   heels elevated   tilted  Taken 1/23/2025 0000 by Laura Mercado RN  Body Position:   turned   heels elevated   tilted  Taken 1/22/2025 2200 by Laura Mercado RN  Body Position:   turned   heels elevated   tilted  Taken 1/22/2025 2000 by Laura Mercado RN  Body Position:   turned   heels elevated   tilted  Intervention: Prevent and Manage VTE (Venous Thromboembolism) Risk  Recent Flowsheet Documentation  Taken 1/23/2025 0000 by Laura Mercado RN  VTE Prevention/Management: SCDs off (sequential compression devices)  Taken 1/22/2025 2000 by Laura Mercado RN  VTE Prevention/Management: SCDs off (sequential compression devices)  Intervention: Prevent Infection  Recent Flowsheet Documentation  Taken 1/23/2025 0000 by Laura Mercado RN  Infection Prevention:   single patient room provided   rest/sleep promoted   personal protective equipment utilized   hand hygiene promoted   equipment surfaces disinfected  Taken 1/22/2025 2000 by Laura Mercado RN  Infection Prevention:   single patient room provided   rest/sleep promoted   personal protective equipment utilized   hand hygiene promoted   equipment surfaces disinfected  Goal: Optimal Comfort and Wellbeing  Intervention: Provide Person-Centered Care  Recent Flowsheet Documentation  Taken 1/23/2025 0000 by Laura Mercado RN  Trust Relationship/Rapport:   care explained   reassurance provided  Taken 1/22/2025 2000 by  Laura Mercado RN  Trust Relationship/Rapport:   care explained   reassurance provided     Problem: Fall Injury Risk  Goal: Absence of Fall and Fall-Related Injury  Intervention: Identify and Manage Contributors  Recent Flowsheet Documentation  Taken 1/23/2025 0000 by Laura Mercado RN  Medication Review/Management: medications reviewed  Taken 1/22/2025 2000 by Laura Mercado RN  Medication Review/Management: medications reviewed  Intervention: Promote Injury-Free Environment  Recent Flowsheet Documentation  Taken 1/23/2025 0000 by Laura Mercado RN  Safety Promotion/Fall Prevention:   clutter free environment maintained   increased rounding and observation   increase visualization of patient   room door open   room near nurse's station   safety round/check completed   room organization consistent   supervised activity   treat reversible contributory factors   treat underlying cause  Taken 1/22/2025 2000 by Laura Mercado RN  Safety Promotion/Fall Prevention:   clutter free environment maintained   increased rounding and observation   increase visualization of patient   room door open   room near nurse's station   safety round/check completed   room organization consistent   supervised activity   treat reversible contributory factors   treat underlying cause     Problem: Gas Exchange Impaired  Goal: Optimal Gas Exchange  Intervention: Optimize Oxygenation and Ventilation  Recent Flowsheet Documentation  Taken 1/23/2025 0400 by Laura Mercado RN  Head of Bed (HOB) Positioning: HOB at 30 degrees  Taken 1/23/2025 0200 by Laura Mercado RN  Head of Bed (HOB) Positioning: HOB at 30 degrees  Taken 1/23/2025 0000 by Laura Mercado RN  Airway/Ventilation Management:   airway patency maintained   pulmonary hygiene promoted   calming measures promoted   position adjusted   oxygen therapy provided  Head of Bed (HOB) Positioning: HOB at 30 degrees  Taken 1/22/2025 2200 by  Laura Mercado RN  Head of Bed (HOB) Positioning: HOB at 30 degrees  Taken 1/22/2025 2000 by Laura Mercado RN  Airway/Ventilation Management:   airway patency maintained   pulmonary hygiene promoted   calming measures promoted   position adjusted   oxygen therapy provided  Head of Bed (HOB) Positioning: HOB at 30 degrees     Problem: Restraint, Nonviolent  Goal: Absence of Harm or Injury  Intervention: Implement Least Restrictive Safety Strategies  Recent Flowsheet Documentation  Taken 1/23/2025 0000 by Laura Mercado RN  Medical Device Protection:   IV pole/bag removed from visual field   tubing secured  Taken 1/22/2025 2000 by Laura Mercado RN  Medical Device Protection:   IV pole/bag removed from visual field   tubing secured  Intervention: Protect Dignity, Rights and Personal Wellbeing  Recent Flowsheet Documentation  Taken 1/23/2025 0000 by Laura Mercado RN  Trust Relationship/Rapport:   care explained   reassurance provided  Taken 1/22/2025 2000 by Laura Mercado RN  Trust Relationship/Rapport:   care explained   reassurance provided  Intervention: Protect Skin and Joint Integrity  Recent Flowsheet Documentation  Taken 1/23/2025 0400 by Laura Mercado RN  Body Position:   turned   heels elevated   tilted  Taken 1/23/2025 0200 by Laura Mercado RN  Body Position:   turned   heels elevated   tilted  Taken 1/23/2025 0000 by Laura Mercado RN  Body Position:   turned   heels elevated   tilted  Range of Motion: active ROM (range of motion) encouraged  Taken 1/22/2025 2200 by Laura Mercado RN  Body Position:   turned   heels elevated   tilted  Taken 1/22/2025 2000 by Laura Mercado RN  Body Position:   turned   heels elevated   tilted  Range of Motion: active ROM (range of motion) encouraged  Taken 1/22/2025 1941 by Laura Mercado RN  Range of Motion: active ROM (range of motion) encouraged     Problem: Risk for Delirium  Goal:  Improved Behavioral Control  Intervention: Minimize Safety Risk  Recent Flowsheet Documentation  Taken 1/23/2025 0000 by Laura Mercado RN  Enhanced Safety Measures:   room near unit station   review medications for side effects with activity   patient/family teach back on injury risk   pain management   monitor patients coagulation values   family to remain at bedside   assistive devices when indicated  Trust Relationship/Rapport:   care explained   reassurance provided  Taken 1/22/2025 2000 by Laura Mercado RN  Enhanced Safety Measures:   room near unit station   review medications for side effects with activity   patient/family teach back on injury risk   pain management   monitor patients coagulation values   family to remain at bedside   assistive devices when indicated  Trust Relationship/Rapport:   care explained   reassurance provided  Goal: Improved Attention and Thought Clarity  Intervention: Maximize Cognitive Function  Recent Flowsheet Documentation  Taken 1/23/2025 0000 by Laura Mercado RN  Sensory Stimulation Regulation:   care clustered   lighting decreased   quiet environment promoted   visual stimulation minimized  Reorientation Measures:   calendar in view   clock in view   familiar social contact encouraged   reorientation provided  Taken 1/22/2025 2000 by Laura Mercado RN  Sensory Stimulation Regulation:   care clustered   lighting decreased   quiet environment promoted   visual stimulation minimized  Reorientation Measures:   calendar in view   clock in view   familiar social contact encouraged   reorientation provided     Problem: Mechanical Ventilation Invasive  Goal: Effective Communication  Intervention: Ensure Effective Communication  Recent Flowsheet Documentation  Taken 1/23/2025 0000 by Laura Mercado RN  Trust Relationship/Rapport:   care explained   reassurance provided  Taken 1/22/2025 2000 by Laura Mercado RN  Trust  Relationship/Rapport:   care explained   reassurance provided  Goal: Optimal Device Function  Intervention: Optimize Device Care and Function  Recent Flowsheet Documentation  Taken 1/23/2025 0400 by Laura Mercado RN  Oral Care:   teeth brushed   tongue brushed   suction provided  Taken 1/23/2025 0000 by Laura Mercado RN  Airway Safety Measures:   all equipment/monitors on and audible   suction regulator   suction equipment   suction at bedside   oxygen flowmeter  Airway/Ventilation Management:   airway patency maintained   pulmonary hygiene promoted   calming measures promoted   position adjusted   oxygen therapy provided  Oral Care:   teeth brushed   tongue brushed   suction provided  Taken 1/22/2025 2000 by Laura Mercado RN  Airway Safety Measures:   all equipment/monitors on and audible   suction regulator   suction equipment   suction at bedside   oxygen flowmeter  Airway/Ventilation Management:   airway patency maintained   pulmonary hygiene promoted   calming measures promoted   position adjusted   oxygen therapy provided  Oral Care:   teeth brushed   tongue brushed   suction provided  Goal: Mechanical Ventilation Liberation  Intervention: Promote Extubation and Mechanical Ventilation Liberation  Recent Flowsheet Documentation  Taken 1/23/2025 0000 by Laura Mercado RN  Medication Review/Management: medications reviewed  Taken 1/22/2025 2000 by Laura Mercado RN  Medication Review/Management: medications reviewed  Goal: Absence of Ventilator-Induced Lung Injury  Intervention: Facilitate Lung-Protection Measures  Recent Flowsheet Documentation  Taken 1/23/2025 0000 by Laura Mercado RN  Lung Protection Measures:   optimal PEEP applied   ventilator synchrony promoted   ventilator waveforms monitored   lung compliance monitored  Taken 1/22/2025 2000 by Laura Mercado RN  Lung Protection Measures:   optimal PEEP applied   ventilator synchrony promoted    ventilator waveforms monitored   lung compliance monitored  Intervention: Prevent Ventilator-Associated Pneumonia  Recent Flowsheet Documentation  Taken 1/23/2025 0400 by Laura Mercado RN  Oral Care:   teeth brushed   tongue brushed   suction provided  Head of Bed (HOB) Positioning: HOB at 30 degrees  Taken 1/23/2025 0200 by Laura Mercado RN  Head of Bed (HOB) Positioning: HOB at 30 degrees  Taken 1/23/2025 0000 by Laura Mercado RN  Oral Care:   teeth brushed   tongue brushed   suction provided  Head of Bed (HOB) Positioning: HOB at 30 degrees  Taken 1/22/2025 2200 by Laura Mercado RN  Head of Bed (HOB) Positioning: HOB at 30 degrees  Taken 1/22/2025 2000 by Laura Mercado RN  Oral Care:   teeth brushed   tongue brushed   suction provided  Head of Bed (HOB) Positioning: HOB at 30 degrees       Bilat soft wrist restraints for attem

## 2025-01-23 NOTE — PROGRESS NOTES
ICU Progress Note   Date of Service: 01/23/25    Assessment and Plan:  4 year old female with PMHx significant for ESRD on HD, CHF, COPD, poor Mobility (L AKA, Obesity, WC bound), DM type II, hypertension.  She presented to the ER on 1/8/25 complaining of shortness of breath and was diagnosed with Influenza A. On 1/9/25 she was transferred to the ICU and intubated due to worsening acute on chronic respiratory failure. On the evening of 1/18/25 she was extubated but unfortunately she was reintubated on 1/20.         Neuro  Intubated  Likely baseline cognitive impairment  - Precedex for sedation for mechanical ventilation  - PTA Zoloft, gabapentin     Pulmonary  Acute hypoxemic respiratory failure  Influenza A  COPD exacerbation  Hx of JONE  ? Hospital acquired pneumonia  - Continue methylpred 40mg q24 through 1/23  - Continue nebs  - Cont pulmicort nebs BID  - iHD today   - Broad spectrum antibiotics as per ID section  - Has moderate secretions on exam, SBT today but likely re-assess in AM for possible extubation.     Cardiac  Shock, likely due to sedation  - MAP goal > 65, currently off norepi  - Transition amio gtt to PO when more stable  - IV heparin gtt     Renal  ESRD on HD  - Nephrology consult, appreciate recs  - iHD planned today  - Appears to be closer to euvolemia      GI  No active issues  - RD to manage TFs     Heme/Onc  Chronic anemia  Hx of Afib  - monitor CBC  - Transfuse for Hgb < 7  - Heparin gtt     ID  Influenza A pneumona  ? Hospital acquired pneumonia  - Tamiflu treatment completed  - Cefepime/Vanc restarted 1/20 after bronch  - BAL cultures with actinomyces, CXR showing focal infiltrates will repeat CT chest to assess for actinomycosis though suspicion is low and actinomyces could be just colonizers   - discontinue vancomycin and continue cefepime for now     Endo  Hx of DM  - monitor BG  - sliding scale insulin         PPX  1. DVT: heparin gtt   2. VAP: HOB 30 degrees, chlorhexidine rinse  3.  "Stress Ulcer: PPI  4. Restraints: Nonviolent soft two point restraints required and necessary for patient safety and continued cares and good effect as patient continues to pull at necessary lines, tubes despite education and distraction. Will readdress daily.   5. Wound care - per unit routine   6. Feeding - TF  7. Family updated last evening at bedside    Dispo: ICU      /61   Pulse 104   Temp 98.4  F (36.9  C) (Axillary)   Resp 18   Ht 1.702 m (5' 7\")   Wt 101 kg (222 lb 10.6 oz)   LMP  (LMP Unknown)   SpO2 99%   BMI 34.87 kg/m      FiO2 (%): 30 %, Resp: 18, Vent Mode: CMV/AC, Resp Rate (Set): 12 breaths/min, Tidal Volume (Set, mL): 400 mL, PEEP (cm H2O): 8 cmH2O, Resp Rate (Set): 12 breaths/min, Tidal Volume (Set, mL): 400 mL, PEEP (cm H2O): 8 cmH2O      Intake/Output Summary (Last 24 hours) at 1/23/2025 1407  Last data filed at 1/23/2025 1126  Gross per 24 hour   Intake 1633.91 ml   Output --   Net 1633.91 ml       Physical Exam:  Gen: Laying in bed in NAD  HEENT: NC AT  Resp: intubated, B/L air entry, no wheezing or rhonchi  CVS: S1 S2, regular rhythm on tele  Abd: Soft NT ND  Ext: no swelling noted, s/p left BKA  Neuro: opens eyes to voice, follows commands       Labs: reviewed    Imaging: reviewed    Past Medical/Surgical history     Family history     Social history     Time Spent on this Encounter   Supriya was seen and evaluated by me on 01/23/25.  She was in critical condition as the result of acute hypoxic respiratory failure.    Her condition is now Fair.      The acute issues managed by me today include acute hypoxic resp failure, ESRD, HAP, and afib   Supportive interventions provided and/or ordered by me include mechanical ventilation, iv antibiotics, amiodarone, anticoagulation, and repeat chest CT    Total Critical Care time spent by me, excluding procedures, was 50 minutes.    Cheo Watt MD   "

## 2025-01-23 NOTE — PLAN OF CARE
"Patient her due to respiratory failure. Mechanically ventilated FIO2 30% PEEP 8. Alert and able to follow commands. Tele shows atrial fibrillation. Lantus insulin started however blood glucose elevated in the 300's; MD notified and insulin drip started. Patient pressure supported for 1.5 hours but tired afterwards; will reassess tomorrow after dialysis for extubation. Tube feeding running @ 55mL. Family at bedside throughout the day and updated. Currently infusing amiodarone, and precedex, heparin.  Problem: Adult Inpatient Plan of Care  Goal: Plan of Care Review  Description: The Plan of Care Review/Shift note should be completed every shift.  The Outcome Evaluation is a brief statement about your assessment that the patient is improving, declining, or no change.  This information will be displayed automatically on your shift  note.  Outcome: Progressing  Goal: Patient-Specific Goal (Individualized)  Description: You can add care plan individualizations to a care plan. Examples of Individualization might be:  \"Parent requests to be called daily at 9am for status\", \"I have a hard time hearing out of my right ear\", or \"Do not touch me to wake me up as it startles  me\".  Outcome: Progressing  Goal: Absence of Hospital-Acquired Illness or Injury  Outcome: Progressing  Intervention: Identify and Manage Fall Risk  Recent Flowsheet Documentation  Taken 1/22/2025 1600 by Lyndon Jacobs, RN  Safety Promotion/Fall Prevention:   clutter free environment maintained   increased rounding and observation   increase visualization of patient   room door open   room near nurse's station   safety round/check completed   room organization consistent   supervised activity   treat reversible contributory factors   treat underlying cause  Taken 1/22/2025 1200 by Lyndon Jacobs, RN  Safety Promotion/Fall Prevention:   clutter free environment maintained   increased rounding and observation   increase visualization of patient   room door " open   room near nurse's station   safety round/check completed   room organization consistent   supervised activity   treat reversible contributory factors   treat underlying cause  Taken 1/22/2025 0800 by Lyndon Jacobs RN  Safety Promotion/Fall Prevention:   clutter free environment maintained   increased rounding and observation   increase visualization of patient   room door open   room near nurse's station   safety round/check completed   room organization consistent   supervised activity   treat reversible contributory factors   treat underlying cause  Intervention: Prevent Skin Injury  Recent Flowsheet Documentation  Taken 1/22/2025 1800 by Lyndon Jacobs RN  Body Position:   turned   right  Taken 1/22/2025 1600 by Lyndon Jacobs RN  Body Position:   turned   left   heels elevated   upper extremity elevated  Taken 1/22/2025 1400 by Lyndon Jacobs RN  Body Position:   turned   right   heels elevated   upper extremity elevated  Taken 1/22/2025 1200 by Lyndon Jacobs RN  Body Position:   turned   left   heels elevated   upper extremity elevated  Taken 1/22/2025 1000 by Lyndon Jacobs RN  Body Position:   turned   right   heels elevated   upper extremity elevated  Taken 1/22/2025 0800 by Lyndon Jacobs RN  Body Position:   turned   left   heels elevated   upper extremity elevated  Intervention: Prevent and Manage VTE (Venous Thromboembolism) Risk  Recent Flowsheet Documentation  Taken 1/22/2025 1600 by Lyndon Jacobs RN  VTE Prevention/Management: SCDs off (sequential compression devices)  Taken 1/22/2025 1200 by Lyndon Jacobs RN  VTE Prevention/Management: SCDs off (sequential compression devices)  Taken 1/22/2025 0800 by Lyndon Jacobs RN  VTE Prevention/Management: SCDs off (sequential compression devices)  Intervention: Prevent Infection  Recent Flowsheet Documentation  Taken 1/22/2025 1600 by Lyndon Jacobs RN  Infection Prevention:   single patient room provided   rest/sleep promoted   personal protective  equipment utilized   hand hygiene promoted   equipment surfaces disinfected  Taken 1/22/2025 1200 by Lyndon Jacobs RN  Infection Prevention:   single patient room provided   rest/sleep promoted   personal protective equipment utilized   hand hygiene promoted   equipment surfaces disinfected  Taken 1/22/2025 0800 by Lyndon Jacobs RN  Infection Prevention:   single patient room provided   rest/sleep promoted   personal protective equipment utilized   hand hygiene promoted   equipment surfaces disinfected  Goal: Optimal Comfort and Wellbeing  Outcome: Progressing  Intervention: Provide Person-Centered Care  Recent Flowsheet Documentation  Taken 1/22/2025 1600 by Lyndon Jacobs RN  Trust Relationship/Rapport:   care explained   choices provided  Taken 1/22/2025 1200 by Lyndon Jacobs RN  Trust Relationship/Rapport:   care explained   choices provided  Taken 1/22/2025 0800 by Lyndon Jacobs RN  Trust Relationship/Rapport:   care explained   choices provided  Goal: Readiness for Transition of Care  Outcome: Progressing     Problem: Fall Injury Risk  Goal: Absence of Fall and Fall-Related Injury  Outcome: Progressing  Intervention: Identify and Manage Contributors  Recent Flowsheet Documentation  Taken 1/22/2025 1600 by Lyndon Jacobs RN  Medication Review/Management: medications reviewed  Taken 1/22/2025 1200 by Lyndon Jacobs RN  Medication Review/Management: medications reviewed  Taken 1/22/2025 0800 by Lyndon Jacobs RN  Medication Review/Management: medications reviewed  Intervention: Promote Injury-Free Environment  Recent Flowsheet Documentation  Taken 1/22/2025 1600 by Lyndon Jacobs RN  Safety Promotion/Fall Prevention:   clutter free environment maintained   increased rounding and observation   increase visualization of patient   room door open   room near nurse's station   safety round/check completed   room organization consistent   supervised activity   treat reversible contributory factors   treat  underlying cause  Taken 1/22/2025 1200 by Lyndon Jacobs RN  Safety Promotion/Fall Prevention:   clutter free environment maintained   increased rounding and observation   increase visualization of patient   room door open   room near nurse's station   safety round/check completed   room organization consistent   supervised activity   treat reversible contributory factors   treat underlying cause  Taken 1/22/2025 0800 by Lyndon Jacobs RN  Safety Promotion/Fall Prevention:   clutter free environment maintained   increased rounding and observation   increase visualization of patient   room door open   room near nurse's station   safety round/check completed   room organization consistent   supervised activity   treat reversible contributory factors   treat underlying cause     Problem: Pain Acute  Goal: Optimal Pain Control and Function  Outcome: Progressing  Intervention: Optimize Psychosocial Wellbeing  Recent Flowsheet Documentation  Taken 1/22/2025 1600 by Lyndon Jacobs RN  Supportive Measures:   positive reinforcement provided   relaxation techniques promoted  Taken 1/22/2025 1200 by Lyndon Jacobs RN  Supportive Measures:   positive reinforcement provided   relaxation techniques promoted  Taken 1/22/2025 0800 by Lyndon Jacobs RN  Supportive Measures:   positive reinforcement provided   relaxation techniques promoted  Intervention: Prevent or Manage Pain  Recent Flowsheet Documentation  Taken 1/22/2025 1600 by Lyndon Jacobs RN  Sensory Stimulation Regulation:   care clustered   lighting decreased   quiet environment promoted   visual stimulation minimized  Medication Review/Management: medications reviewed  Taken 1/22/2025 1200 by Lyndon Jacobs RN  Sensory Stimulation Regulation:   care clustered   lighting decreased   quiet environment promoted   visual stimulation minimized  Medication Review/Management: medications reviewed  Taken 1/22/2025 0800 by Lyndon Jacobs RN  Sensory Stimulation Regulation:   care  clustered   lighting decreased   quiet environment promoted   visual stimulation minimized  Medication Review/Management: medications reviewed     Problem: Gas Exchange Impaired  Goal: Optimal Gas Exchange  Outcome: Progressing  Intervention: Optimize Oxygenation and Ventilation  Recent Flowsheet Documentation  Taken 1/22/2025 1800 by Lyndon Jacobs RN  Head of Bed (HOB) Positioning: HOB at 30-45 degrees  Taken 1/22/2025 1600 by Lyndon Jacobs RN  Head of Bed (HOB) Positioning: HOB at 30-45 degrees  Taken 1/22/2025 1400 by Lyndon Jacobs RN  Head of Bed (HOB) Positioning: HOB at 30 degrees  Taken 1/22/2025 1200 by Lyndon Jacobs RN  Head of Bed (HOB) Positioning: HOB at 30 degrees  Taken 1/22/2025 1000 by Lyndon Jacobs RN  Head of Bed (HOB) Positioning: HOB at 30 degrees  Taken 1/22/2025 0800 by Lyndon Jacobs RN  Head of Bed (HOB) Positioning: HOB at 30 degrees     Problem: Risk for Delirium  Goal: Optimal Coping  Outcome: Progressing  Intervention: Optimize Psychosocial Adjustment to Delirium  Recent Flowsheet Documentation  Taken 1/22/2025 1600 by Lyndon Jacobs RN  Supportive Measures:   positive reinforcement provided   relaxation techniques promoted  Taken 1/22/2025 1200 by Lyndon Jacobs RN  Supportive Measures:   positive reinforcement provided   relaxation techniques promoted  Taken 1/22/2025 0800 by Lyndon Jacobs RN  Supportive Measures:   positive reinforcement provided   relaxation techniques promoted  Goal: Improved Behavioral Control  Outcome: Progressing  Intervention: Prevent and Manage Agitation  Recent Flowsheet Documentation  Taken 1/22/2025 1600 by Lyndon Jacobs RN  Environment Familiarity/Consistency: daily routine followed  Taken 1/22/2025 1200 by Lyndon Jacobs RN  Environment Familiarity/Consistency: daily routine followed  Taken 1/22/2025 0800 by Lyndon Jacobs RN  Environment Familiarity/Consistency: daily routine followed  Intervention: Minimize Safety Risk  Recent Flowsheet  Documentation  Taken 1/22/2025 1600 by Lyndon Jacobs RN  Enhanced Safety Measures:   room near unit station   review medications for side effects with activity   patient/family teach back on injury risk   pain management   monitor patients coagulation values   family to remain at bedside   assistive devices when indicated  Trust Relationship/Rapport:   care explained   choices provided  Taken 1/22/2025 1200 by Lyndon Jacobs RN  Enhanced Safety Measures:   room near unit station   review medications for side effects with activity   patient/family teach back on injury risk   pain management   monitor patients coagulation values   family to remain at bedside   assistive devices when indicated  Trust Relationship/Rapport:   care explained   choices provided  Taken 1/22/2025 0800 by Lyndon Jacobs RN  Enhanced Safety Measures:   room near unit station   review medications for side effects with activity   patient/family teach back on injury risk   pain management   monitor patients coagulation values   family to remain at bedside   assistive devices when indicated  Trust Relationship/Rapport:   care explained   choices provided  Goal: Improved Attention and Thought Clarity  Outcome: Progressing  Intervention: Maximize Cognitive Function  Recent Flowsheet Documentation  Taken 1/22/2025 1600 by Lyndon Jacobs RN  Sensory Stimulation Regulation:   care clustered   lighting decreased   quiet environment promoted   visual stimulation minimized  Reorientation Measures:   calendar in view   clock in view   familiar social contact encouraged   reorientation provided  Taken 1/22/2025 1200 by Lyndon Jacobs RN  Sensory Stimulation Regulation:   care clustered   lighting decreased   quiet environment promoted   visual stimulation minimized  Reorientation Measures:   calendar in view   clock in view   familiar social contact encouraged   reorientation provided  Taken 1/22/2025 0800 by Lyndon Jacobs RN  Sensory Stimulation  Regulation:   care clustered   lighting decreased   quiet environment promoted   visual stimulation minimized  Reorientation Measures:   calendar in view   clock in view   familiar social contact encouraged   reorientation provided  Goal: Improved Sleep  Outcome: Progressing

## 2025-01-23 NOTE — PROGRESS NOTES
[facility] ICU RESPIRATORY NOTE        Date of Admission: 1/8/2025    Date of Intubation (most recent): 1/20/2025    Reason for Mechanical Ventilation: Respiratory failure    Number of Days on Mechanical Ventilation: 4    Met Criteria for Spontaneous Breathing Trial: Yes     Bite Block: No    Significant Events Today: None overnight    ABG Results:   Recent Labs   Lab 01/20/25  1541 01/20/25  0830 01/17/25  1213 01/16/25  0901   O2PER 50 5 30 30         Current Vent Settings: FiO2 (%): 35 %, Resp: 17, Vent Mode: CMV/AC, Resp Rate (Set): 12 breaths/min, Tidal Volume (Set, mL): 400 mL, PEEP (cm H2O): 8 cmH2O, Resp Rate (Set): 12 breaths/min, Tidal Volume (Set, mL): 400 mL, PEEP (cm H2O): 8 cmH2O    Skin Assessment: Intact    Plan: Continue full support until ready for weaning    David Natarajan, RT on 1/23/2025 at 5:03 AM

## 2025-01-23 NOTE — CONSULTS
Infection Prevention Note:  Patient had 1 neg AFB smear, Chest xray does not show signs of infection and was negative for MTB PCR.    I have removed the Rule out Tb and airborne precautions.  Magalie Staples, Infection Prevention on 1/23/2025 at 10:29 AM

## 2025-01-23 NOTE — PROGRESS NOTES
Potassium   Date Value Ref Range Status   01/23/2025 3.9 3.4 - 5.3 mmol/L Final   02/02/2022 4.7 3.4 - 5.3 mmol/L Final   04/17/2021 4.2 3.4 - 5.3 mmol/L Final     Hemoglobin   Date Value Ref Range Status   01/23/2025 8.0 (L) 11.7 - 15.7 g/dL Final   04/16/2021 10.9 (L) 11.7 - 15.7 g/dL Final     Creatinine   Date Value Ref Range Status   01/23/2025 4.19 (H) 0.51 - 0.95 mg/dL Final   04/17/2021 5.98 (H) 0.52 - 1.04 mg/dL Final     Urea Nitrogen   Date Value Ref Range Status   01/23/2025 74.5 (H) 8.0 - 23.0 mg/dL Final   11/24/2021 37 (H) 7 - 30 mg/dL Final   04/17/2021 68 (H) 7 - 30 mg/dL Final     Sodium   Date Value Ref Range Status   01/23/2025 134 (L) 135 - 145 mmol/L Final   04/17/2021 137 133 - 144 mmol/L Final     INR   Date Value Ref Range Status   04/24/2024 1.11 0.85 - 1.15 Final   04/16/2021 1.14 0.86 - 1.14 Final       DIALYSIS PROCEDURE NOTE  Hepatitis status of previous patient on machine log was checked and verified ok to use with this patients hepatitis status.  Patient dialyzed for 3.5 hrs. on a K4 bath with a net fluid removal of  2.5L.  A BFR of 400 ml/min was obtained via a Left AV Fistula using 15 gauge needles.      The treatment plan was discussed with Dr. Hawley during the treatment.    Total heparin received during the treatment: 0 units.   Needle cannulation sites held x 10 min venous and 12 min arterial.       Meds  given: Albumin 5% 250ml   Complications: none      Person educated: Patient. Knowledge base moderate. Barriers to learning: Fatigue, Intubated. Educated on procedure via verbal mode. The patient/family nodded her head she is unable to verbalize understanding.   ICEBOAT? Timeout performed pre-treatment  I: Patient was identified using 2 identifiers  C:  Consent Signed Yes  E: Equipment preventative maintenance is current and dialysis delivery system OK to use  B: Hepatitis B Surface Antigen: negative; Draw Date: 1/08/25      Hepatitis B Surface Antibody: Susceptible; Draw Date:  01/09/25  O: Dialysis orders present and complete prior to treatment  A: Vascular access verified and assessed prior to treatment  T: Treatment was performed at a clinically appropriate time  ?: Patient was allowed to ask questions and address concerns prior to treatment  See Adult Hemodialysis flowsheet in EPIC for further details and post assessment.  Machine water alarm in place and functioning. Transducer pods intact and checked every 15min.   Pt assisted with repositioning throughout dialysis treatment.    Chlorine/Chloramine water system checked every 4 hours.  Outpatient Dialysis at [facility] on T,TH,Sat      Post treatment report given to Demetrio, RN regarding 2L of fluid removed, last BP    Please remove patient dressing on AVF and AVG needle sites 24 hours after dialysis. If leaking occurs please apply a Band-Aid.

## 2025-01-23 NOTE — PHARMACY-VANCOMYCIN DOSING SERVICE
Pharmacy Vancomycin Note  Date of Service 2025  Patient's  1950   75 year old, female    Indication: Healthcare-Associated Pneumonia  Day of Therapy: started   Current vancomycin regimen: intermittent.   Current vancomycin monitoring method: Renal Replacement Therapy  Current vancomycin therapeutic monitoring goal: 15-20 mg/L    Current estimated CrCl = Estimated Creatinine Clearance: 14 mL/min (A) (based on SCr of 4.19 mg/dL (H)).    Creatinine for last 3 days  2025:  5:45 AM Creatinine 3.86 mg/dL  2025:  5:56 AM Creatinine 3.29 mg/dL  2025:  5:39 AM Creatinine 4.19 mg/dL    Recent Vancomycin Levels (past 3 days)  2025:  8:21 PM Vancomycin 20.4 ug/mL  2025:  5:56 AM Vancomycin 24.6 ug/mL    Vancomycin IV Administrations (past 72 hours)                     vancomycin (VANCOCIN) 500 mg vial to attach to  mL bag (mg) 500 mg New Bag 25 2245    vancomycin (VANCOCIN) 2,000 mg in 0.9% NaCl 520 mL intermittent infusion (mg) 2,000 mg New Bag 25 0554                    Nephrotoxins and other renal medications (From now, onward)      Start     Dose/Rate Route Frequency Ordered Stop    25 1600  vancomycin (VANCOCIN) 750 mg in sodium chloride 0.9 % 282.5 mL intermittent infusion         750 mg  over 60 Minutes Intravenous ONCE 25 1250      25 0355  vancomycin place cardoso - receiving intermittent dosing         1 each Intravenous SEE ADMIN INSTRUCTIONS 25 0356      25 1430  norepinephrine (LEVOPHED) 4 mg in  mL infusion PREMIX         0.01-0.6 mcg/kg/min × 100.4 kg  3.8-225.9 mL/hr  Intravenous CONTINUOUS 25 1418      25 1230  [Held by provider]  furosemide (LASIX) tablet 80 mg        (On hold since 2025 at 1418 until manually unheld; held by Truman Moreland MDHold Reason: Other)   Note to Pharmacy: Patient taking differently: Take 40 mg in a.m. and 80 mg in p.m. on dialysis days and 80 mg twice daily on  non dialysis days    80 mg Oral or NG Tube 2 TIMES DAILY (Diuretics and Nitrates) 01/14/25 1214                 Contrast Orders - past 72 hours (72h ago, onward)      Start     Dose/Rate Route Frequency Stop    01/21/25 1500  perflutren diluted in saline (DEFINITY) injection 2 mL        Note to Pharmacy: NDC# 74003-905-40    2 mL Intravenous ONCE 01/21/25 1443            Plan:  Dose with 750 mg (~7 mg/kg).  Vancomycin monitoring method: Renal Replacement Therapy  Vancomycin therapeutic monitoring goal: 15-20 mg/L  Pharmacy will check vancomycin levels as appropriate in  prior to next HD .    Shila Allen, PharmD

## 2025-01-23 NOTE — TELEPHONE ENCOUNTER
Retail Pharmacy Prior Authorization Team   Phone: [phone]    PA Initiation    Medication: SEVELAMER CARBONATE 800 MG PO TABS  Insurance Company: [employer] - Phone [phone] Fax [phone]  Pharmacy Filling the Rx: [facility] - [place], [place] - [address]  Filling Pharmacy Phone:    Filling Pharmacy Fax:    Start Date: 1/23/2025

## 2025-01-23 NOTE — PROGRESS NOTES
Renal Medicine Inpatient Dialysis Note                                Supriya Herr MRN# [id number]   Age: 75 year old YOB: 1950   Date of Admission: 1/8/2025 Hospital LOS: 15          Assessment/Plan:     1) Influenza A with hypoxic resp failure.   On oseltamivir at ESRD dose.   Extubated   Remains above target weight although not overtly very hypervolemic.     2) end-stage renal disease   Chronic dialysis at [facility] ([facility] Tuesday Thursday Saturday   Primary nephrologist Dr. Basilio  Access: Right AV fistula,   Run time 3.5 hours as outpatient     3) Anemia: HGB today 9. 2, stable she is on Mircera as outpt.  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  Vit D 2000 units daily.  Renvela 1600 mg TID c meals and 800 mg with snacks.      TTS schedule   Next 01.25.25      Interval History:     Re intubated 01.20.25    Scheduled run with UF as tolerated to 2.5 liter  Low dose NE    Dialysis run parameters reviewed with dialysis RN at patient bedside.    3.5 hours  4K  UF as tolerated     ROS     Intubated and sedated: ROS unable    Dialysis Parameters:     Vitals were reviewed  Patient Vitals for the past 8 hrs:   BP Pulse Resp SpO2   01/23/25 1200 123/60 76 19 98 %   01/23/25 1145 126/58 89 21 98 %   01/23/25 1130 117/64 103 18 98 %   01/23/25 1115 133/64 99 16 99 %   01/23/25 1056 125/56 84 14 99 %   01/23/25 0727 -- -- -- 100 %     Wt Readings from Last 4 Encounters:   01/23/25 101 kg (222 lb 10.6 oz)   06/14/24 101.1 kg (222 lb 14.2 oz)   06/03/24 101.1 kg (222 lb 14.2 oz)   04/24/24 101.1 kg (222 lb 14.2 oz)     I/O last 3 completed shifts:  In: 2377.94 [I.V.:1077.94; NG/GT:640]  Out: -     Vitals:    01/18/25 0200 01/19/25 0400 01/20/25 0500 01/21/25 0400   Weight: 101.2 kg (223 lb 1.7 oz) 99.3 kg (218 lb 14.7 oz) 100.4 kg (221 lb 5.5 oz) 96 kg (211 lb 10.3 oz)    01/23/25 0000  "  Weight: 101 kg (222 lb 10.6 oz)       Current Weight: 101  Dry Weight: 96?  Dialysis Temp: 36.5  C  Access Device: AVF  Access Site: right  Dialyzer: Revaclear  Dialysis Bath: 4  Sodium Profile: n  UF Goal: 3  Blood Flow Rate (mL/min): 400  Total Treatment Time (hrs): 3.5  Heparin: Low dose as required      EPO dose: n  Zemplar: n  IV Fe: n      Medications and Allergies:     Reviewed      Physical Exam:     Seen and examined during course of dialysis run    /60   Pulse 76   Temp 98.4  F (36.9  C) (Axillary)   Resp 19   Ht 1.702 m (5' 7\")   Wt 101 kg (222 lb 10.6 oz)   LMP  (LMP Unknown)   SpO2 98%   BMI 34.87 kg/m      GENERAL: intubated  HEENT: ETT  RESP: clear anteriorly  CV: RRR, normal S1 S2  MS: no edema  EXT: access canulated without issue    Data:       Recent Labs   Lab 01/23/25  0943 01/23/25  0743 01/23/25  0539   NA  --   --  134*   POTASSIUM  --   --  3.9   CHLORIDE  --   --  94*   CO2  --   --  25   ANIONGAP  --   --  15   *   < > 138*   BUN  --   --  74.5*   CR  --   --  4.19*   GFRESTIMATED  --   --  10*   JODY  --   --  10.1    < > = values in this interval not displayed.     Recent Labs   Lab 01/23/25  0539 01/22/25  0556   POTASSIUM 3.9 3.7     No lab results found in last 7 days.    Recent Labs   Lab 01/23/25  0539 01/22/25  0556 01/21/25  0545 01/20/25  1845 01/20/25  0539   PHOS  --   --   --   --  7.8*   HGB 8.0* 7.9* 9.6* 10.7* 10.2*         G Dereck Hawley MD    [facility] - Nephrology  [phone]           "

## 2025-01-23 NOTE — PROGRESS NOTES
[facility] ICU RESPIRATORY NOTE        Date of Admission: 1/8/2025    Date of Intubation (most recent): 1/20/25    Reason for Mechanical Ventilation: Respiratory Failure    Number of Days on Mechanical Ventilation: 4    Met Criteria for Spontaneous Breathing Trial: Yes    Bite Block: No    Significant Events Today: Pt placed on PS 5/+8 after dialysis.    ABG Results:   Recent Labs   Lab 01/20/25  1541 01/20/25  0830 01/17/25  1213   O2PER 50 5 30         Current Vent Settings: FiO2 (%): 30 %, Resp: 19, Vent Mode: CPAP/PS, Resp Rate (Set): 12 breaths/min, Tidal Volume (Set, mL): 400 mL, PEEP (cm H2O): 8 cmH2O, Pressure Support (cm H2O): 5 cmH2O, Resp Rate (Set): 12 breaths/min, Tidal Volume (Set, mL): 400 mL, PEEP (cm H2O): 8 cmH2O      Plan: Chest CT this evening    Kamryn Newman, RT on 1/23/2025 at 4:51 PM

## 2025-01-24 LAB
ANION GAP SERPL CALCULATED.3IONS-SCNC: 13 MMOL/L (ref 7–15)
BACTERIA BRONCH: ABNORMAL
BUN SERPL-MCNC: 41.3 MG/DL (ref 8–23)
CALCIUM SERPL-MCNC: 9.8 MG/DL (ref 8.8–10.4)
CHLORIDE SERPL-SCNC: 97 MMOL/L (ref 98–107)
CREAT SERPL-MCNC: 2.66 MG/DL (ref 0.51–0.95)
EGFRCR SERPLBLD CKD-EPI 2021: 18 ML/MIN/1.73M2
ERYTHROCYTE [DISTWIDTH] IN BLOOD BY AUTOMATED COUNT: 18.3 % (ref 10–15)
GLUCOSE BLDC GLUCOMTR-MCNC: 108 MG/DL (ref 70–99)
GLUCOSE BLDC GLUCOMTR-MCNC: 109 MG/DL (ref 70–99)
GLUCOSE BLDC GLUCOMTR-MCNC: 109 MG/DL (ref 70–99)
GLUCOSE BLDC GLUCOMTR-MCNC: 112 MG/DL (ref 70–99)
GLUCOSE BLDC GLUCOMTR-MCNC: 116 MG/DL (ref 70–99)
GLUCOSE BLDC GLUCOMTR-MCNC: 117 MG/DL (ref 70–99)
GLUCOSE BLDC GLUCOMTR-MCNC: 123 MG/DL (ref 70–99)
GLUCOSE BLDC GLUCOMTR-MCNC: 126 MG/DL (ref 70–99)
GLUCOSE BLDC GLUCOMTR-MCNC: 129 MG/DL (ref 70–99)
GLUCOSE BLDC GLUCOMTR-MCNC: 139 MG/DL (ref 70–99)
GLUCOSE BLDC GLUCOMTR-MCNC: 144 MG/DL (ref 70–99)
GLUCOSE BLDC GLUCOMTR-MCNC: 149 MG/DL (ref 70–99)
GLUCOSE BLDC GLUCOMTR-MCNC: 151 MG/DL (ref 70–99)
GLUCOSE BLDC GLUCOMTR-MCNC: 156 MG/DL (ref 70–99)
GLUCOSE BLDC GLUCOMTR-MCNC: 164 MG/DL (ref 70–99)
GLUCOSE SERPL-MCNC: 122 MG/DL (ref 70–99)
GRAM STAIN RESULT: ABNORMAL
HCO3 SERPL-SCNC: 26 MMOL/L (ref 22–29)
HCT VFR BLD AUTO: 24.2 % (ref 35–47)
HGB BLD-MCNC: 7.7 G/DL (ref 11.7–15.7)
MCH RBC QN AUTO: 32.2 PG (ref 26.5–33)
MCHC RBC AUTO-ENTMCNC: 31.8 G/DL (ref 31.5–36.5)
MCV RBC AUTO: 101 FL (ref 78–100)
PLATELET # BLD AUTO: 171 10E3/UL (ref 150–450)
POTASSIUM SERPL-SCNC: 4 MMOL/L (ref 3.4–5.3)
RBC # BLD AUTO: 2.39 10E6/UL (ref 3.8–5.2)
SODIUM SERPL-SCNC: 136 MMOL/L (ref 135–145)
UFH PPP CHRO-ACNC: 0.28 IU/ML
WBC # BLD AUTO: 6.2 10E3/UL (ref 4–11)

## 2025-01-24 PROCEDURE — 99231 SBSQ HOSP IP/OBS SF/LOW 25: CPT | Performed by: INTERNAL MEDICINE

## 2025-01-24 PROCEDURE — 94640 AIRWAY INHALATION TREATMENT: CPT | Mod: 76

## 2025-01-24 PROCEDURE — 250N000009 HC RX 250

## 2025-01-24 PROCEDURE — 200N000001 HC R&B ICU

## 2025-01-24 PROCEDURE — 99291 CRITICAL CARE FIRST HOUR: CPT | Performed by: INTERNAL MEDICINE

## 2025-01-24 PROCEDURE — 999N000253 HC STATISTIC WEANING TRIALS

## 2025-01-24 PROCEDURE — 250N000009 HC RX 250: Performed by: INTERNAL MEDICINE

## 2025-01-24 PROCEDURE — 250N000009 HC RX 250: Performed by: STUDENT IN AN ORGANIZED HEALTH CARE EDUCATION/TRAINING PROGRAM

## 2025-01-24 PROCEDURE — 94003 VENT MGMT INPAT SUBQ DAY: CPT

## 2025-01-24 PROCEDURE — 250N000013 HC RX MED GY IP 250 OP 250 PS 637: Performed by: STUDENT IN AN ORGANIZED HEALTH CARE EDUCATION/TRAINING PROGRAM

## 2025-01-24 PROCEDURE — 84132 ASSAY OF SERUM POTASSIUM: CPT | Performed by: STUDENT IN AN ORGANIZED HEALTH CARE EDUCATION/TRAINING PROGRAM

## 2025-01-24 PROCEDURE — 80048 BASIC METABOLIC PNL TOTAL CA: CPT | Performed by: STUDENT IN AN ORGANIZED HEALTH CARE EDUCATION/TRAINING PROGRAM

## 2025-01-24 PROCEDURE — 250N000011 HC RX IP 250 OP 636: Performed by: INTERNAL MEDICINE

## 2025-01-24 PROCEDURE — 120N000004 HC R&B MS OVERFLOW

## 2025-01-24 PROCEDURE — 99292 CRITICAL CARE ADDL 30 MIN: CPT | Performed by: INTERNAL MEDICINE

## 2025-01-24 PROCEDURE — 999N000157 HC STATISTIC RCP TIME EA 10 MIN

## 2025-01-24 PROCEDURE — 85018 HEMOGLOBIN: CPT | Performed by: STUDENT IN AN ORGANIZED HEALTH CARE EDUCATION/TRAINING PROGRAM

## 2025-01-24 PROCEDURE — 250N000013 HC RX MED GY IP 250 OP 250 PS 637: Performed by: INTERNAL MEDICINE

## 2025-01-24 PROCEDURE — 250N000011 HC RX IP 250 OP 636: Performed by: STUDENT IN AN ORGANIZED HEALTH CARE EDUCATION/TRAINING PROGRAM

## 2025-01-24 PROCEDURE — 94640 AIRWAY INHALATION TREATMENT: CPT

## 2025-01-24 PROCEDURE — 85520 HEPARIN ASSAY: CPT | Performed by: STUDENT IN AN ORGANIZED HEALTH CARE EDUCATION/TRAINING PROGRAM

## 2025-01-24 PROCEDURE — 999N000009 HC STATISTIC AIRWAY CARE

## 2025-01-24 PROCEDURE — 999N000287 HC ICU ADULT ROUNDING, EACH 10 MINS

## 2025-01-24 RX ORDER — DEXAMETHASONE SODIUM PHOSPHATE 10 MG/ML
10 INJECTION, SOLUTION INTRAMUSCULAR; INTRAVENOUS ONCE
Status: COMPLETED | OUTPATIENT
Start: 2025-01-24 | End: 2025-01-24

## 2025-01-24 RX ORDER — AMIODARONE HYDROCHLORIDE 200 MG/1
400 TABLET ORAL 2 TIMES DAILY
Status: COMPLETED | OUTPATIENT
Start: 2025-01-24 | End: 2025-01-26

## 2025-01-24 RX ORDER — LEVOFLOXACIN 5 MG/ML
750 INJECTION, SOLUTION INTRAVENOUS ONCE
Status: COMPLETED | OUTPATIENT
Start: 2025-01-24 | End: 2025-01-24

## 2025-01-24 RX ORDER — AMIODARONE HYDROCHLORIDE 200 MG/1
200 TABLET ORAL DAILY
Status: DISCONTINUED | OUTPATIENT
Start: 2025-01-27 | End: 2025-02-05

## 2025-01-24 RX ORDER — DEXAMETHASONE SODIUM PHOSPHATE 10 MG/ML
10 INJECTION, SOLUTION INTRAMUSCULAR; INTRAVENOUS EVERY 6 HOURS
Status: DISCONTINUED | OUTPATIENT
Start: 2025-01-24 | End: 2025-01-26

## 2025-01-24 RX ORDER — LEVOFLOXACIN 500 MG/1
500 TABLET, FILM COATED ORAL
Status: COMPLETED | OUTPATIENT
Start: 2025-01-26 | End: 2025-01-26

## 2025-01-24 RX ADMIN — BUDESONIDE 1 MG: 1 INHALANT ORAL at 07:57

## 2025-01-24 RX ADMIN — Medication 40 MG: at 08:29

## 2025-01-24 RX ADMIN — TACROLIMUS: 1 OINTMENT TOPICAL at 08:35

## 2025-01-24 RX ADMIN — INSULIN HUMAN 4 UNITS/HR: 1 INJECTION, SOLUTION INTRAVENOUS at 05:49

## 2025-01-24 RX ADMIN — LEVOFLOXACIN 750 MG: 5 INJECTION, SOLUTION INTRAVENOUS at 18:19

## 2025-01-24 RX ADMIN — Medication 50 MCG: at 08:29

## 2025-01-24 RX ADMIN — Medication 5 ML: at 08:29

## 2025-01-24 RX ADMIN — IPRATROPIUM BROMIDE AND ALBUTEROL SULFATE 3 ML: .5; 3 SOLUTION RESPIRATORY (INHALATION) at 15:30

## 2025-01-24 RX ADMIN — DEXAMETHASONE SODIUM PHOSPHATE 10 MG: 10 INJECTION, SOLUTION INTRAMUSCULAR; INTRAVENOUS at 23:58

## 2025-01-24 RX ADMIN — IPRATROPIUM BROMIDE AND ALBUTEROL SULFATE 3 ML: .5; 3 SOLUTION RESPIRATORY (INHALATION) at 11:45

## 2025-01-24 RX ADMIN — AMIODARONE HYDROCHLORIDE 0.5 MG/MIN: 1.8 INJECTION, SOLUTION INTRAVENOUS at 05:48

## 2025-01-24 RX ADMIN — MICONAZOLE NITRATE: 2 POWDER TOPICAL at 08:35

## 2025-01-24 RX ADMIN — HEPARIN SODIUM 1550 UNITS/HR: 10000 INJECTION, SOLUTION INTRAVENOUS at 14:52

## 2025-01-24 RX ADMIN — DEXMEDETOMIDINE HYDROCHLORIDE 0.2 MCG/KG/HR: 400 INJECTION INTRAVENOUS at 05:51

## 2025-01-24 RX ADMIN — IPRATROPIUM BROMIDE AND ALBUTEROL SULFATE 3 ML: .5; 3 SOLUTION RESPIRATORY (INHALATION) at 19:12

## 2025-01-24 RX ADMIN — AMIODARONE HYDROCHLORIDE 400 MG: 200 TABLET ORAL at 20:39

## 2025-01-24 RX ADMIN — CETIRIZINE HYDROCHLORIDE 5 MG: 5 TABLET ORAL at 21:10

## 2025-01-24 RX ADMIN — DEXMEDETOMIDINE HYDROCHLORIDE 0.3 MCG/KG/HR: 400 INJECTION INTRAVENOUS at 23:58

## 2025-01-24 RX ADMIN — MICONAZOLE NITRATE: 2 POWDER TOPICAL at 21:02

## 2025-01-24 RX ADMIN — HEPARIN SODIUM 1550 UNITS/HR: 10000 INJECTION, SOLUTION INTRAVENOUS at 00:13

## 2025-01-24 RX ADMIN — CHLORHEXIDINE GLUCONATE 15 ML: 1.2 RINSE ORAL at 20:39

## 2025-01-24 RX ADMIN — ACETAMINOPHEN 650 MG: 325 TABLET, FILM COATED ORAL at 14:17

## 2025-01-24 RX ADMIN — IPRATROPIUM BROMIDE AND ALBUTEROL SULFATE 3 ML: .5; 3 SOLUTION RESPIRATORY (INHALATION) at 07:57

## 2025-01-24 RX ADMIN — CHLORHEXIDINE GLUCONATE 15 ML: 1.2 RINSE ORAL at 08:30

## 2025-01-24 RX ADMIN — SERTRALINE HYDROCHLORIDE 75 MG: 50 TABLET ORAL at 20:38

## 2025-01-24 RX ADMIN — DEXAMETHASONE SODIUM PHOSPHATE 10 MG: 10 INJECTION, SOLUTION INTRAMUSCULAR; INTRAVENOUS at 12:21

## 2025-01-24 RX ADMIN — ATORVASTATIN CALCIUM 20 MG: 10 TABLET, FILM COATED ORAL at 20:39

## 2025-01-24 RX ADMIN — DEXAMETHASONE SODIUM PHOSPHATE 10 MG: 10 INJECTION, SOLUTION INTRAMUSCULAR; INTRAVENOUS at 18:09

## 2025-01-24 ASSESSMENT — ACTIVITIES OF DAILY LIVING (ADL)
ADLS_ACUITY_SCORE: 81
ADLS_ACUITY_SCORE: 85
ADLS_ACUITY_SCORE: 85
ADLS_ACUITY_SCORE: 81
ADLS_ACUITY_SCORE: 81
ADLS_ACUITY_SCORE: 85
ADLS_ACUITY_SCORE: 81
ADLS_ACUITY_SCORE: 85
ADLS_ACUITY_SCORE: 81
ADLS_ACUITY_SCORE: 85
ADLS_ACUITY_SCORE: 85
ADLS_ACUITY_SCORE: 83
ADLS_ACUITY_SCORE: 81
ADLS_ACUITY_SCORE: 85
ADLS_ACUITY_SCORE: 85
ADLS_ACUITY_SCORE: 81
ADLS_ACUITY_SCORE: 81
ADLS_ACUITY_SCORE: 85
ADLS_ACUITY_SCORE: 85
ADLS_ACUITY_SCORE: 81
ADLS_ACUITY_SCORE: 85
ADLS_ACUITY_SCORE: 81
ADLS_ACUITY_SCORE: 81

## 2025-01-24 NOTE — PROGRESS NOTES
Care Management Follow Up    Length of Stay (days): 16    Expected Discharge Date: 01/29/2025     Concerns to be Addressed: other (see comments) (elevated risk)     Patient plan of care discussed at interdisciplinary rounds: Yes    Anticipated Discharge Disposition: TBD              Anticipated Discharge Services: None  Anticipated Discharge DME: None    Patient/family educated on Medicare website which has current facility and service quality ratings: no  Education Provided on the Discharge Plan: No  Patient/Family in Agreement with the Plan: yes    Referrals Placed by CM/SW:    Private pay costs discussed: Not applicable    Discussed  Partnership in Safe Discharge Planning  document with patient/family: No     Handoff Completed: Yes, [facility] PCP: Internal handoff referral completed    Additional Information:  Patient remains intubated.    Next Steps: Care Management will continue to follow for discharge planning.     SHANTA Salcedo RN, BSN, OCN   Inpatient Care Coordination ICU  [facility]  Office: [phone]

## 2025-01-24 NOTE — PLAN OF CARE
Goal Outcome Evaluation:         Patient remains on full ventilator support. Neuro remains unchanged and continues to follow commands. Patient answers to yes/no questions. Tele Afib w/CVR. Moderate inline and oral secretions w/frequent suctioning. Insulin drip continued at algorithm 4. BM x2 overnight. Anuric. Plan for possible extubation this AM pending SBT.

## 2025-01-24 NOTE — PROGRESS NOTES
[facility] ICU RESPIRATORY NOTE        Date of Admission: 1/8/2025    Date of Intubation (most recent): 01/25/2025    Reason for Mechanical Ventilation: Respiratory failure.    Number of Days on Mechanical Ventilation: 5    Met Criteria for Spontaneous Breathing Trial: Yes.    Bite Block: No    Significant Events Today: None.    ABG Results:   Recent Labs   Lab 01/20/25  1541 01/20/25  0830 01/17/25  1213   O2PER 50 5 30         Current Vent Settings: FiO2 (%): 30 %, Resp: 21, Vent Mode: CMV/AC, Resp Rate (Set): 12 breaths/min, Tidal Volume (Set, mL): 400 mL, PEEP (cm H2O): 8 cmH2O, Pressure Support (cm H2O): 5 cmH2O, Resp Rate (Set): 12 breaths/min, Tidal Volume (Set, mL): 400 mL, PEEP (cm H2O): 8 cmH2O    Margret San, RT on 1/24/2025 at 6:04 AM

## 2025-01-24 NOTE — PLAN OF CARE
Goal Outcome Evaluation:      Plan of Care Reviewed With: patient, child    Overall Patient Progress: improvingOverall Patient Progress: improving    Outcome Evaluation: Patient is alert and continues to be intubated. Follows commands and will shake head appropriately to yes/no questions. AFIB CVR/RVR on tele. MAP goal maintained>65. Diminished LS. Vent CMV/AC- 30% PEEP 8. Tolerated PS and was going to extubate but no cuff leak so steroids started. NPO- NG w/ TF to goal. Anuric. Insulin gtt continued. Precedex gtt continued. Plan to PS tomorrow. Daughter updated at bedside- all questions and concerns addressed.      Problem: Fall Injury Risk  Goal: Absence of Fall and Fall-Related Injury  Outcome: Progressing  Intervention: Identify and Manage Contributors  Recent Flowsheet Documentation  Taken 1/24/2025 1600 by Jacquelyn Dyson RN  Medication Review/Management: medications reviewed  Taken 1/24/2025 1200 by Jacquelyn Dyson RN  Medication Review/Management: medications reviewed  Taken 1/24/2025 0800 by Jacquelyn Dyson RN  Medication Review/Management: medications reviewed  Intervention: Promote Injury-Free Environment  Recent Flowsheet Documentation  Taken 1/24/2025 1600 by Jacquelyn Dyson RN  Safety Promotion/Fall Prevention:   clutter free environment maintained   increased rounding and observation   increase visualization of patient   room door open   room near nurse's station   safety round/check completed   room organization consistent   supervised activity   treat reversible contributory factors   treat underlying cause  Taken 1/24/2025 1400 by Jacquelyn Dyson RN  Safety Promotion/Fall Prevention: safety round/check completed  Taken 1/24/2025 1200 by Jacquelyn Dyson RN  Safety Promotion/Fall Prevention:   clutter free environment maintained   increased rounding and observation   increase visualization of patient   room door open   room near nurse's station   safety round/check completed   room  organization consistent   supervised activity   treat reversible contributory factors   treat underlying cause  Taken 1/24/2025 1000 by Jacquelyn Dyson RN  Safety Promotion/Fall Prevention: safety round/check completed  Taken 1/24/2025 0800 by Jacquelyn Dyson RN  Safety Promotion/Fall Prevention:   clutter free environment maintained   increased rounding and observation   increase visualization of patient   room door open   room near nurse's station   safety round/check completed   room organization consistent   supervised activity   treat reversible contributory factors   treat underlying cause     Problem: Pain Acute  Goal: Optimal Pain Control and Function  Outcome: Progressing  Intervention: Optimize Psychosocial Wellbeing  Recent Flowsheet Documentation  Taken 1/24/2025 1600 by Jacquelyn Dyson RN  Supportive Measures:   positive reinforcement provided   relaxation techniques promoted  Taken 1/24/2025 1200 by Jacquelyn Dyson RN  Supportive Measures:   positive reinforcement provided   relaxation techniques promoted  Taken 1/24/2025 0800 by Jacquelyn Dyson RN  Supportive Measures:   positive reinforcement provided   relaxation techniques promoted  Intervention: Develop Pain Management Plan  Recent Flowsheet Documentation  Taken 1/24/2025 1600 by Jacquelyn Dyson RN  Pain Management Interventions:   pillow support provided   repositioned   rest  Taken 1/24/2025 1400 by Jacquelyn Dyson RN  Pain Management Interventions:   pillow support provided   repositioned   rest   medication (see MAR)  Taken 1/24/2025 1200 by Jacquelyn Dyson RN  Pain Management Interventions:   pillow support provided   repositioned   rest  Taken 1/24/2025 1000 by Jacquelyn Dyson RN  Pain Management Interventions:   pillow support provided   repositioned   rest  Taken 1/24/2025 0800 by Jacquelyn Dyson RN  Pain Management Interventions:   pillow support provided   repositioned   rest  Intervention: Prevent or Manage  Pain  Recent Flowsheet Documentation  Taken 1/24/2025 1600 by Jacquelyn Dyson RN  Sensory Stimulation Regulation:   care clustered   lighting decreased   quiet environment promoted  Sleep/Rest Enhancement:   awakenings minimized   comfort measures   consistent schedule promoted   music provided   medication   natural light exposure provided   noise level reduced   regular sleep/rest pattern promoted   relaxation techniques promoted   visualization  Medication Review/Management: medications reviewed  Taken 1/24/2025 1400 by Jacquelyn Dyson RN  Sleep/Rest Enhancement:   awakenings minimized   comfort measures   consistent schedule promoted   music provided   medication   natural light exposure provided   noise level reduced   regular sleep/rest pattern promoted   relaxation techniques promoted   visualization  Taken 1/24/2025 1200 by Jacquelyn Dyson RN  Sensory Stimulation Regulation:   care clustered   lighting decreased   quiet environment promoted  Sleep/Rest Enhancement:   awakenings minimized   comfort measures   consistent schedule promoted   music provided   medication   natural light exposure provided   noise level reduced   regular sleep/rest pattern promoted   relaxation techniques promoted   visualization  Medication Review/Management: medications reviewed  Taken 1/24/2025 1000 by Jacquelyn Dyson RN  Sleep/Rest Enhancement:   awakenings minimized   comfort measures   consistent schedule promoted   music provided   medication   natural light exposure provided   noise level reduced   regular sleep/rest pattern promoted   relaxation techniques promoted   visualization  Taken 1/24/2025 0800 by Jacquelyn Dyson RN  Sensory Stimulation Regulation:   care clustered   lighting decreased   quiet environment promoted  Sleep/Rest Enhancement:   awakenings minimized   comfort measures   consistent schedule promoted   music provided   medication   natural light exposure provided   noise level reduced   regular  sleep/rest pattern promoted   relaxation techniques promoted   visualization  Medication Review/Management: medications reviewed     Problem: Gas Exchange Impaired  Goal: Optimal Gas Exchange  Outcome: Progressing  Intervention: Optimize Oxygenation and Ventilation  Recent Flowsheet Documentation  Taken 1/24/2025 1600 by Jacquelyn Dyson RN  Airway/Ventilation Management:   airway patency maintained   calming measures promoted   humidification applied   pulmonary hygiene promoted  Head of Bed (HOB) Positioning: HOB at 30 degrees  Taken 1/24/2025 1400 by Jacquelyn Dyson RN  Head of Bed (HOB) Positioning: HOB at 30 degrees  Taken 1/24/2025 1200 by Jacquelyn Dyson RN  Airway/Ventilation Management:   airway patency maintained   calming measures promoted   humidification applied   pulmonary hygiene promoted  Head of Bed (HOB) Positioning: HOB at 30 degrees  Taken 1/24/2025 1000 by Jacquelyn Dyson RN  Head of Bed (HOB) Positioning: HOB at 30 degrees  Taken 1/24/2025 0800 by Jacquelyn Dyson RN  Airway/Ventilation Management:   airway patency maintained   calming measures promoted   humidification applied   pulmonary hygiene promoted  Head of Bed (HOB) Positioning: HOB at 30 degrees     Problem: Risk for Delirium  Goal: Optimal Coping  Outcome: Progressing  Intervention: Optimize Psychosocial Adjustment to Delirium  Recent Flowsheet Documentation  Taken 1/24/2025 1600 by Jacquelyn Dyson RN  Supportive Measures:   positive reinforcement provided   relaxation techniques promoted  Family/Support System Care: support provided  Taken 1/24/2025 1400 by Jacquelyn Dyson RN  Family/Support System Care: support provided  Taken 1/24/2025 1200 by Jacquelyn Dyson RN  Supportive Measures:   positive reinforcement provided   relaxation techniques promoted  Taken 1/24/2025 0800 by Jacquelyn Dyson RN  Supportive Measures:   positive reinforcement provided   relaxation techniques promoted  Goal: Improved Behavioral  Control  Outcome: Progressing  Intervention: Prevent and Manage Agitation  Recent Flowsheet Documentation  Taken 1/24/2025 1600 by Jacquelyn Dyson RN  Environment Familiarity/Consistency: daily routine followed  Taken 1/24/2025 1200 by Jacquelyn Dyson RN  Environment Familiarity/Consistency: daily routine followed  Taken 1/24/2025 0800 by Jacquelyn Dyson RN  Environment Familiarity/Consistency: daily routine followed  Intervention: Minimize Safety Risk  Recent Flowsheet Documentation  Taken 1/24/2025 1600 by Jacquelyn Dyson RN  Enhanced Safety Measures:   room near unit station   review medications for side effects with activity   patient/family teach back on injury risk   pain management   monitor patients coagulation values   family to remain at bedside   assistive devices when indicated  Trust Relationship/Rapport:   care explained   thoughts/feelings acknowledged  Taken 1/24/2025 1400 by Jacquelyn Dyson RN  Trust Relationship/Rapport:   care explained   thoughts/feelings acknowledged  Taken 1/24/2025 1200 by Jacquelyn Dyson RN  Enhanced Safety Measures:   room near unit station   review medications for side effects with activity   patient/family teach back on injury risk   pain management   monitor patients coagulation values   family to remain at bedside   assistive devices when indicated  Trust Relationship/Rapport:   care explained   thoughts/feelings acknowledged  Taken 1/24/2025 1000 by Jacquelyn Dyson RN  Trust Relationship/Rapport:   care explained   thoughts/feelings acknowledged  Taken 1/24/2025 0800 by Jacquelyn Dyson RN  Enhanced Safety Measures:   room near unit station   review medications for side effects with activity   patient/family teach back on injury risk   pain management   monitor patients coagulation values   family to remain at bedside   assistive devices when indicated  Trust Relationship/Rapport:   care explained   thoughts/feelings acknowledged  Goal: Improved Attention  and Thought Clarity  Outcome: Progressing  Intervention: Maximize Cognitive Function  Recent Flowsheet Documentation  Taken 1/24/2025 1600 by Jacquelyn Dyson RN  Sensory Stimulation Regulation:   care clustered   lighting decreased   quiet environment promoted  Reorientation Measures:   calendar in view   clock in view   reorientation provided  Taken 1/24/2025 1200 by Jacquelyn Dyson RN  Sensory Stimulation Regulation:   care clustered   lighting decreased   quiet environment promoted  Reorientation Measures:   calendar in view   clock in view   reorientation provided  Taken 1/24/2025 0800 by Jacquelyn Dyson RN  Sensory Stimulation Regulation:   care clustered   lighting decreased   quiet environment promoted  Reorientation Measures:   calendar in view   clock in view   reorientation provided  Goal: Improved Sleep  Outcome: Progressing  Intervention: Promote Sleep  Recent Flowsheet Documentation  Taken 1/24/2025 1600 by Jacquelyn Dyson RN  Sleep/Rest Enhancement:   awakenings minimized   comfort measures   consistent schedule promoted   music provided   medication   natural light exposure provided   noise level reduced   regular sleep/rest pattern promoted   relaxation techniques promoted   visualization  Taken 1/24/2025 1400 by Jacquelyn Dyson RN  Sleep/Rest Enhancement:   awakenings minimized   comfort measures   consistent schedule promoted   music provided   medication   natural light exposure provided   noise level reduced   regular sleep/rest pattern promoted   relaxation techniques promoted   visualization  Taken 1/24/2025 1200 by Jacquelyn Dyson RN  Sleep/Rest Enhancement:   awakenings minimized   comfort measures   consistent schedule promoted   music provided   medication   natural light exposure provided   noise level reduced   regular sleep/rest pattern promoted   relaxation techniques promoted   visualization  Taken 1/24/2025 1000 by Jacquelyn Dyson RN  Sleep/Rest Enhancement:   awakenings  minimized   comfort measures   consistent schedule promoted   music provided   medication   natural light exposure provided   noise level reduced   regular sleep/rest pattern promoted   relaxation techniques promoted   visualization  Taken 1/24/2025 0800 by Jacquelyn Dyson RN  Sleep/Rest Enhancement:   awakenings minimized   comfort measures   consistent schedule promoted   music provided   medication   natural light exposure provided   noise level reduced   regular sleep/rest pattern promoted   relaxation techniques promoted   visualization

## 2025-01-24 NOTE — PLAN OF CARE
"Patient is here due to respiratory failure. Mechanically ventilated FIO2 30% PEEP 8. Alert and able to follow commands. Tele shows atrial fibrillation. Chest x-ray obtained today that showed right mid-lung opacities. Chest CT performed and showed possible atelectasis on right lung. Tube feeding running @ 55mL. Family at bedside throughout the day and updated. Currently infusing amiodarone, precedex, insulin, and heparin.  Problem: Adult Inpatient Plan of Care  Goal: Plan of Care Review  Description: The Plan of Care Review/Shift note should be completed every shift.  The Outcome Evaluation is a brief statement about your assessment that the patient is improving, declining, or no change.  This information will be displayed automatically on your shift  note.  Outcome: Progressing  Goal: Patient-Specific Goal (Individualized)  Description: You can add care plan individualizations to a care plan. Examples of Individualization might be:  \"Parent requests to be called daily at 9am for status\", \"I have a hard time hearing out of my right ear\", or \"Do not touch me to wake me up as it startles  me\".  Outcome: Progressing  Goal: Absence of Hospital-Acquired Illness or Injury  Outcome: Progressing  Intervention: Identify and Manage Fall Risk  Recent Flowsheet Documentation  Taken 1/23/2025 1600 by Lyndon Jacobs, RN  Safety Promotion/Fall Prevention:   clutter free environment maintained   increased rounding and observation   increase visualization of patient   room door open   room near nurse's station   safety round/check completed   room organization consistent   supervised activity   treat reversible contributory factors   treat underlying cause  Taken 1/23/2025 1200 by Lyndon Jacobs, RN  Safety Promotion/Fall Prevention:   clutter free environment maintained   increased rounding and observation   increase visualization of patient   room door open   room near nurse's station   safety round/check completed   room " organization consistent   supervised activity   treat reversible contributory factors   treat underlying cause  Taken 1/23/2025 0800 by Lyndon Jacobs RN  Safety Promotion/Fall Prevention:   clutter free environment maintained   increased rounding and observation   increase visualization of patient   room door open   room near nurse's station   safety round/check completed   room organization consistent   supervised activity   treat reversible contributory factors   treat underlying cause  Intervention: Prevent Skin Injury  Recent Flowsheet Documentation  Taken 1/23/2025 1800 by Lyndon Jacobs RN  Body Position:   turned   heels elevated   upper extremity elevated  Taken 1/23/2025 1600 by Lyndon Jacobs RN  Body Position:   turned   heels elevated   upper extremity elevated   right  Taken 1/23/2025 1400 by Lyndon Jacobs RN  Body Position:   turned   left   heels elevated   upper extremity elevated  Taken 1/23/2025 1200 by Lyndon Jacobs RN  Body Position:   turned   heels elevated   upper extremity elevated   right  Taken 1/23/2025 1000 by Lyndon Jacobs RN  Body Position:   turned   left   heels elevated   upper extremity elevated  Taken 1/23/2025 0800 by Lyndon Jacobs RN  Body Position:   turned   heels elevated   upper extremity elevated   right  Intervention: Prevent and Manage VTE (Venous Thromboembolism) Risk  Recent Flowsheet Documentation  Taken 1/23/2025 1600 by Lyndon Jacobs RN  VTE Prevention/Management: SCDs off (sequential compression devices)  Taken 1/23/2025 1200 by Lyndon Jacobs RN  VTE Prevention/Management: SCDs off (sequential compression devices)  Taken 1/23/2025 0800 by Lyndon Jacobs RN  VTE Prevention/Management: SCDs off (sequential compression devices)  Intervention: Prevent Infection  Recent Flowsheet Documentation  Taken 1/23/2025 1600 by Lyndon Jacobs RN  Infection Prevention:   single patient room provided   rest/sleep promoted   personal protective equipment utilized   hand hygiene  promoted   equipment surfaces disinfected  Taken 1/23/2025 1200 by Lyndon Jacobs RN  Infection Prevention:   single patient room provided   rest/sleep promoted   personal protective equipment utilized   hand hygiene promoted   equipment surfaces disinfected  Taken 1/23/2025 0800 by Lyndon Jacobs RN  Infection Prevention:   single patient room provided   rest/sleep promoted   personal protective equipment utilized   hand hygiene promoted   equipment surfaces disinfected  Goal: Optimal Comfort and Wellbeing  Outcome: Progressing  Intervention: Provide Person-Centered Care  Recent Flowsheet Documentation  Taken 1/23/2025 1600 by Lyndon Jacobs RN  Trust Relationship/Rapport:   care explained   choices provided  Taken 1/23/2025 1200 by Lyndon Jacobs RN  Trust Relationship/Rapport:   care explained   choices provided  Taken 1/23/2025 0800 by Lyndon Jacobs RN  Trust Relationship/Rapport:   care explained   choices provided  Goal: Readiness for Transition of Care  Outcome: Progressing     Problem: Fall Injury Risk  Goal: Absence of Fall and Fall-Related Injury  Outcome: Progressing  Intervention: Identify and Manage Contributors  Recent Flowsheet Documentation  Taken 1/23/2025 1600 by Lyndon Jacobs RN  Medication Review/Management: medications reviewed  Taken 1/23/2025 1200 by Lyndon Jacobs RN  Medication Review/Management: medications reviewed  Taken 1/23/2025 0800 by Lyndon Jacobs RN  Medication Review/Management: medications reviewed  Intervention: Promote Injury-Free Environment  Recent Flowsheet Documentation  Taken 1/23/2025 1600 by Lyndon Jacobs RN  Safety Promotion/Fall Prevention:   clutter free environment maintained   increased rounding and observation   increase visualization of patient   room door open   room near nurse's station   safety round/check completed   room organization consistent   supervised activity   treat reversible contributory factors   treat underlying cause  Taken 1/23/2025 1200 by  Arlene, Lyndon, RN  Safety Promotion/Fall Prevention:   clutter free environment maintained   increased rounding and observation   increase visualization of patient   room door open   room near nurse's station   safety round/check completed   room organization consistent   supervised activity   treat reversible contributory factors   treat underlying cause  Taken 1/23/2025 0800 by Lyndon Jacobs RN  Safety Promotion/Fall Prevention:   clutter free environment maintained   increased rounding and observation   increase visualization of patient   room door open   room near nurse's station   safety round/check completed   room organization consistent   supervised activity   treat reversible contributory factors   treat underlying cause     Problem: Pain Acute  Goal: Optimal Pain Control and Function  Outcome: Progressing  Intervention: Optimize Psychosocial Wellbeing  Recent Flowsheet Documentation  Taken 1/23/2025 1600 by Lyndon Jacobs RN  Supportive Measures:   positive reinforcement provided   relaxation techniques promoted  Taken 1/23/2025 1200 by Lyndon Jacobs RN  Supportive Measures:   positive reinforcement provided   relaxation techniques promoted  Taken 1/23/2025 0800 by Lyndon Jacobs RN  Supportive Measures:   positive reinforcement provided   relaxation techniques promoted  Intervention: Prevent or Manage Pain  Recent Flowsheet Documentation  Taken 1/23/2025 1600 by Lyndon Jacobs RN  Sensory Stimulation Regulation:   care clustered   lighting decreased   quiet environment promoted   visual stimulation minimized  Medication Review/Management: medications reviewed  Taken 1/23/2025 1200 by Lyndon Jacobs RN  Sensory Stimulation Regulation:   care clustered   lighting decreased   quiet environment promoted   visual stimulation minimized  Medication Review/Management: medications reviewed  Taken 1/23/2025 0800 by Lyndon Jacobs RN  Sensory Stimulation Regulation:   care clustered   lighting decreased   quiet  environment promoted   visual stimulation minimized  Medication Review/Management: medications reviewed     Problem: Gas Exchange Impaired  Goal: Optimal Gas Exchange  Outcome: Progressing  Intervention: Optimize Oxygenation and Ventilation  Recent Flowsheet Documentation  Taken 1/23/2025 1800 by Lyndon Jacobs RN  Head of Bed (HOB) Positioning: HOB at 30 degrees  Taken 1/23/2025 1600 by Lyndon Jacobs RN  Head of Bed (HOB) Positioning:   HOB at 45 degrees   HOB at 30 degrees  Taken 1/23/2025 1000 by Lyndon Jacobs RN  Head of Bed (HOB) Positioning: HOB at 30 degrees  Taken 1/23/2025 0800 by Lyndon Jacobs RN  Head of Bed (HOB) Positioning: HOB at 30 degrees     Problem: Risk for Delirium  Goal: Optimal Coping  Outcome: Progressing  Intervention: Optimize Psychosocial Adjustment to Delirium  Recent Flowsheet Documentation  Taken 1/23/2025 1600 by Lyndon Jacobs RN  Supportive Measures:   positive reinforcement provided   relaxation techniques promoted  Taken 1/23/2025 1200 by Lyndon Jacobs RN  Supportive Measures:   positive reinforcement provided   relaxation techniques promoted  Taken 1/23/2025 0800 by Lyndon Jacobs RN  Supportive Measures:   positive reinforcement provided   relaxation techniques promoted  Goal: Improved Behavioral Control  Outcome: Progressing  Intervention: Prevent and Manage Agitation  Recent Flowsheet Documentation  Taken 1/23/2025 1600 by Lyndon Jacobs RN  Environment Familiarity/Consistency: daily routine followed  Taken 1/23/2025 1200 by Lyndon Jacobs RN  Environment Familiarity/Consistency: daily routine followed  Taken 1/23/2025 0800 by Lyndon Jacobs RN  Environment Familiarity/Consistency: daily routine followed  Intervention: Minimize Safety Risk  Recent Flowsheet Documentation  Taken 1/23/2025 1600 by Lyndon Jacobs RN  Enhanced Safety Measures:   room near unit station   review medications for side effects with activity   patient/family teach back on injury risk   pain  management   monitor patients coagulation values   family to remain at bedside   assistive devices when indicated  Trust Relationship/Rapport:   care explained   choices provided  Taken 1/23/2025 1200 by Lyndon Jacobs RN  Enhanced Safety Measures:   room near unit station   review medications for side effects with activity   patient/family teach back on injury risk   pain management   monitor patients coagulation values   family to remain at bedside   assistive devices when indicated  Trust Relationship/Rapport:   care explained   choices provided  Taken 1/23/2025 0800 by Lyndon Jacobs RN  Enhanced Safety Measures:   room near unit station   review medications for side effects with activity   patient/family teach back on injury risk   pain management   monitor patients coagulation values   family to remain at bedside   assistive devices when indicated  Trust Relationship/Rapport:   care explained   choices provided  Goal: Improved Attention and Thought Clarity  Outcome: Progressing  Intervention: Maximize Cognitive Function  Recent Flowsheet Documentation  Taken 1/23/2025 1600 by Lyndon Jacobs RN  Sensory Stimulation Regulation:   care clustered   lighting decreased   quiet environment promoted   visual stimulation minimized  Reorientation Measures:   calendar in view   clock in view   familiar social contact encouraged   reorientation provided  Taken 1/23/2025 1200 by Lyndon Jacobs RN  Sensory Stimulation Regulation:   care clustered   lighting decreased   quiet environment promoted   visual stimulation minimized  Reorientation Measures:   calendar in view   clock in view   familiar social contact encouraged   reorientation provided  Taken 1/23/2025 0800 by Lyndon Jacobs RN  Sensory Stimulation Regulation:   care clustered   lighting decreased   quiet environment promoted   visual stimulation minimized  Reorientation Measures:   calendar in view   clock in view   familiar social contact encouraged    reorientation provided  Goal: Improved Sleep  Outcome: Progressing

## 2025-01-24 NOTE — PROGRESS NOTES
[id number]    Neuro: PERRL, opens eyes spontaneously, follows commands, on dex.    CV: tele afib CVR , trace edema    Resp: LS dim, vent settings unchanged    GI: BS hypoactive, last BM 1/23/25, diet TF    : anuric    Skin: multiple wounds, scattered bruising. See flowsheets for more details    Activity: assist of 2, lift    Additional: denies pain, afebrile, insuling gtt infusing, Q2hr blood sugar checks, Algorithm 4.      Plan of Care: continue current plan, possible extubation tomorrow pending SBT.

## 2025-01-24 NOTE — PROGRESS NOTES
[facility] ICU RESPIRATORY NOTE        Date of Admission: 1/8/2025    Date of Intubation (most recent): 01/20/2025    Reason for Mechanical Ventilation: Failed extubation, Resp Failure    Number of Days on Mechanical Ventilation: 5    Met Criteria for Spontaneous Breathing Trial: Yes - 5/8 30% for ~2hrs    Bite Block: No    Significant Events Today: Planned to extubate this afternoon but unable to detect any cuff leak at all. Now plan is to give steroids in hopes to reduce AW swelling and postpone extubation until tomorrow 1/25    ABG Results:   Recent Labs   Lab 01/20/25  1541 01/20/25  0830   O2PER 50 5         Current Vent Settings: FiO2 (%): 30 %, Resp: 18, Vent Mode: CMV/AC, Resp Rate (Set): 12 breaths/min, Tidal Volume (Set, mL): 400 mL, PEEP (cm H2O): 8 cmH2O, Pressure Support (cm H2O): 5 cmH2O, Resp Rate (Set): 12 breaths/min, Tidal Volume (Set, mL): 400 mL, PEEP (cm H2O): 8 cmH2O        Plan: Full support overnight and reattempt extubation trial tomorrow    Gaston Fernandez, RT on 1/24/2025 at 5:31 PM

## 2025-01-24 NOTE — PROGRESS NOTES
ICU Progress Note   Date of Service: 01/24/25    Assessment and Plan:  4 year old female with PMHx significant for ESRD on HD, CHF, COPD, poor Mobility (L AKA, Obesity, WC bound), DM type II, hypertension.  She presented to the ER on 1/8/25 complaining of shortness of breath and was diagnosed with Influenza A. On 1/9/25 she was transferred to the ICU and intubated due to worsening acute on chronic respiratory failure. On the evening of 1/18/25 she was extubated but she was reintubated on 1/20.      Overnight events: Had iHD yesterday     Neuro  Intubated  Likely baseline cognitive impairment  - Precedex for sedation for mechanical ventilation  - PTA Zoloft, gabapentin     Pulmonary  Acute hypoxemic respiratory failure  Influenza A  COPD exacerbation  Hx of JONE  ? Hospital acquired pneumonia  Upper airway swelling/ ? Allergic reaction  - Passed SBT this morning, but no airleak noted on deflation of the balloon  - Start high dose sterdoids with dexa 10 mg q6  - Recheck cuff leak in am  - Continue nebs  - discontinue pulmicort nebs while on high dose steroids  - fluid removal with iHD   - Broad spectrum antibiotics as per ID section  - improving secretions on exam today     Cardiac  Shock, likely due to sedation (improved)  Afib   - MAP goal > 65, currently off norepi  - Transition amio gtt to PO  - IV heparin gtt     Renal  ESRD on HD  - Nephrology consult, appreciate recs  - iHD as per schedule  - Appears to be closer to euvolemia      GI  No active issues  - RD to manage TFs     Heme/Onc  Chronic anemia  Hx of Afib  - monitor CBC  - Transfuse for Hgb < 7  - Heparin gtt     ID  Influenza A pneumona  ? Hospital acquired pneumonia  - Tamiflu treatment completed  - Cefepime/Vanc restarted 1/20 after bronch  - BAL cultures with actinomyces, CXR showing focal infiltrates will repeat CT chest to assess for actinomycosis though suspicion is low and actinomyces could be just colonizers   - given upper airway swelling and hx  "of PCN allergy, discontinue cefepime and start levofloxacin to complete 7 days of therapy for HAP     Endo  Hx of DM  - monitor BG  - sliding scale insulin         PPX  1. DVT: heparin gtt   2. VAP: HOB 30 degrees, chlorhexidine rinse  3. Stress Ulcer: PPI  4. Restraints: Nonviolent soft two point restraints required and necessary for patient safety and continued cares and good effect as patient continues to pull at necessary lines, tubes despite education and distraction. Will readdress daily.   5. Wound care - per unit routine   6. Feeding - TF  7. Family updated this morning at bedside     Dispo: ICU      BP (!) 143/57 (BP Location: Right arm, Cuff Size: Adult Large)   Pulse 100   Temp 98.2  F (36.8  C) (Axillary)   Resp 18   Ht 1.702 m (5' 7\")   Wt 102 kg (224 lb 13.9 oz)   LMP  (LMP Unknown)   SpO2 100%   BMI 35.22 kg/m      FiO2 (%): 30 %, Resp: 18, Vent Mode: CMV/AC, Resp Rate (Set): 12 breaths/min, Tidal Volume (Set, mL): 400 mL, PEEP (cm H2O): 8 cmH2O, Pressure Support (cm H2O): 5 cmH2O, Resp Rate (Set): 12 breaths/min, Tidal Volume (Set, mL): 400 mL, PEEP (cm H2O): 8 cmH2O      Intake/Output Summary (Last 24 hours) at 1/24/2025 1628  Last data filed at 1/24/2025 1600  Gross per 24 hour   Intake 2674.9 ml   Output --   Net 2674.9 ml       Physical Exam:  Gen: Laying in bed in NAD  HEENT: NC AT  Resp: intubated, B/L air entry, some rhonchi  CVS: S1 S2, regular rhythm on tele  Abd: Soft NT ND  Ext: no swelling noted  Neuro: awake and alert, following commands    Labs: reviewed    Imaging: reviewed    Past Medical/Surgical history     Family history     Social history     Time Spent on this Encounter   Supriya was seen and evaluated by me on 01/24/25.  She was in critical condition as the result of acute hypoxic respiratory failure.    Her condition is now Serious.      The acute issues managed by me today include acute hypoxic resp failure, upper airway swelling, ESRD, HAP, and afib   Supportive " interventions provided and/or ordered by me include mechanical ventilation, iv antibiotics, amiodarone, anticoagulation and steroids    Total Critical Care time spent by me, excluding procedures, was 75 minutes.    Cheo Watt MD

## 2025-01-24 NOTE — PROGRESS NOTES
Renal Medicine       Following for ESRD management/dialysis dependence  TTS outpatient schedule     Recent runs:    01.20.25  UF = 3 liter  01.21.25  UF = 1 liter limited hypotension  01.23.25  UF =  2.5 liter     Remains intubated  Plan dialysis 01.25.25  UF as tolerated         Recent Labs   Lab 01/24/25  0413 01/23/25  0539   POTASSIUM 4.0 3.9           BARBI Hawley    [facility]  [phone]

## 2025-01-25 LAB
ANION GAP SERPL CALCULATED.3IONS-SCNC: 14 MMOL/L (ref 7–15)
BACTERIA PLR CULT: NO GROWTH
BUN SERPL-MCNC: 64.5 MG/DL (ref 8–23)
CALCIUM SERPL-MCNC: 10.3 MG/DL (ref 8.8–10.4)
CHLORIDE SERPL-SCNC: 98 MMOL/L (ref 98–107)
CREAT SERPL-MCNC: 3.6 MG/DL (ref 0.51–0.95)
EGFRCR SERPLBLD CKD-EPI 2021: 12 ML/MIN/1.73M2
ERYTHROCYTE [DISTWIDTH] IN BLOOD BY AUTOMATED COUNT: 18 % (ref 10–15)
GLUCOSE BLDC GLUCOMTR-MCNC: 107 MG/DL (ref 70–99)
GLUCOSE BLDC GLUCOMTR-MCNC: 120 MG/DL (ref 70–99)
GLUCOSE BLDC GLUCOMTR-MCNC: 121 MG/DL (ref 70–99)
GLUCOSE BLDC GLUCOMTR-MCNC: 125 MG/DL (ref 70–99)
GLUCOSE BLDC GLUCOMTR-MCNC: 126 MG/DL (ref 70–99)
GLUCOSE BLDC GLUCOMTR-MCNC: 131 MG/DL (ref 70–99)
GLUCOSE BLDC GLUCOMTR-MCNC: 131 MG/DL (ref 70–99)
GLUCOSE BLDC GLUCOMTR-MCNC: 135 MG/DL (ref 70–99)
GLUCOSE BLDC GLUCOMTR-MCNC: 146 MG/DL (ref 70–99)
GLUCOSE BLDC GLUCOMTR-MCNC: 151 MG/DL (ref 70–99)
GLUCOSE BLDC GLUCOMTR-MCNC: 156 MG/DL (ref 70–99)
GLUCOSE BLDC GLUCOMTR-MCNC: 168 MG/DL (ref 70–99)
GLUCOSE BLDC GLUCOMTR-MCNC: 171 MG/DL (ref 70–99)
GLUCOSE BLDC GLUCOMTR-MCNC: 179 MG/DL (ref 70–99)
GLUCOSE SERPL-MCNC: 124 MG/DL (ref 70–99)
GRAM STAIN RESULT: NORMAL
GRAM STAIN RESULT: NORMAL
HCO3 SERPL-SCNC: 24 MMOL/L (ref 22–29)
HCT VFR BLD AUTO: 25 % (ref 35–47)
HGB BLD-MCNC: 7.9 G/DL (ref 11.7–15.7)
MCH RBC QN AUTO: 31.9 PG (ref 26.5–33)
MCHC RBC AUTO-ENTMCNC: 31.6 G/DL (ref 31.5–36.5)
MCV RBC AUTO: 101 FL (ref 78–100)
PLATELET # BLD AUTO: 167 10E3/UL (ref 150–450)
POTASSIUM SERPL-SCNC: 5.1 MMOL/L (ref 3.4–5.3)
RBC # BLD AUTO: 2.48 10E6/UL (ref 3.8–5.2)
SODIUM SERPL-SCNC: 136 MMOL/L (ref 135–145)
UFH PPP CHRO-ACNC: 0.43 IU/ML
UFH PPP CHRO-ACNC: NORMAL
WBC # BLD AUTO: 3.9 10E3/UL (ref 4–11)

## 2025-01-25 PROCEDURE — 90935 HEMODIALYSIS ONE EVALUATION: CPT

## 2025-01-25 PROCEDURE — 84075 ASSAY ALKALINE PHOSPHATASE: CPT | Performed by: INTERNAL MEDICINE

## 2025-01-25 PROCEDURE — 250N000009 HC RX 250: Performed by: STUDENT IN AN ORGANIZED HEALTH CARE EDUCATION/TRAINING PROGRAM

## 2025-01-25 PROCEDURE — 250N000013 HC RX MED GY IP 250 OP 250 PS 637: Performed by: INTERNAL MEDICINE

## 2025-01-25 PROCEDURE — 85027 COMPLETE CBC AUTOMATED: CPT | Performed by: STUDENT IN AN ORGANIZED HEALTH CARE EDUCATION/TRAINING PROGRAM

## 2025-01-25 PROCEDURE — 999N000157 HC STATISTIC RCP TIME EA 10 MIN

## 2025-01-25 PROCEDURE — 250N000011 HC RX IP 250 OP 636: Performed by: INTERNAL MEDICINE

## 2025-01-25 PROCEDURE — 250N000009 HC RX 250: Performed by: INTERNAL MEDICINE

## 2025-01-25 PROCEDURE — 250N000009 HC RX 250

## 2025-01-25 PROCEDURE — P9045 ALBUMIN (HUMAN), 5%, 250 ML: HCPCS | Performed by: INTERNAL MEDICINE

## 2025-01-25 PROCEDURE — 82310 ASSAY OF CALCIUM: CPT | Performed by: STUDENT IN AN ORGANIZED HEALTH CARE EDUCATION/TRAINING PROGRAM

## 2025-01-25 PROCEDURE — 250N000013 HC RX MED GY IP 250 OP 250 PS 637: Performed by: STUDENT IN AN ORGANIZED HEALTH CARE EDUCATION/TRAINING PROGRAM

## 2025-01-25 PROCEDURE — 94640 AIRWAY INHALATION TREATMENT: CPT

## 2025-01-25 PROCEDURE — 82248 BILIRUBIN DIRECT: CPT | Performed by: INTERNAL MEDICINE

## 2025-01-25 PROCEDURE — 99291 CRITICAL CARE FIRST HOUR: CPT | Performed by: INTERNAL MEDICINE

## 2025-01-25 PROCEDURE — 200N000001 HC R&B ICU

## 2025-01-25 PROCEDURE — 90935 HEMODIALYSIS ONE EVALUATION: CPT | Performed by: INTERNAL MEDICINE

## 2025-01-25 PROCEDURE — 82247 BILIRUBIN TOTAL: CPT | Performed by: INTERNAL MEDICINE

## 2025-01-25 PROCEDURE — 85520 HEPARIN ASSAY: CPT | Performed by: INTERNAL MEDICINE

## 2025-01-25 PROCEDURE — 80048 BASIC METABOLIC PNL TOTAL CA: CPT | Performed by: STUDENT IN AN ORGANIZED HEALTH CARE EDUCATION/TRAINING PROGRAM

## 2025-01-25 PROCEDURE — 94640 AIRWAY INHALATION TREATMENT: CPT | Mod: 76

## 2025-01-25 PROCEDURE — 94003 VENT MGMT INPAT SUBQ DAY: CPT

## 2025-01-25 PROCEDURE — 258N000003 HC RX IP 258 OP 636: Performed by: INTERNAL MEDICINE

## 2025-01-25 PROCEDURE — 999N000259 HC STATISTIC EXTUBATION

## 2025-01-25 RX ORDER — NALOXONE HYDROCHLORIDE 0.4 MG/ML
0.2 INJECTION, SOLUTION INTRAMUSCULAR; INTRAVENOUS; SUBCUTANEOUS
Status: DISCONTINUED | OUTPATIENT
Start: 2025-01-25 | End: 2025-02-26 | Stop reason: HOSPADM

## 2025-01-25 RX ORDER — HEPARIN SODIUM 1000 [USP'U]/ML
500 INJECTION, SOLUTION INTRAVENOUS; SUBCUTANEOUS CONTINUOUS
Status: DISCONTINUED | OUTPATIENT
Start: 2025-01-25 | End: 2025-01-25

## 2025-01-25 RX ORDER — FENTANYL CITRATE 0.05 MG/ML
25-50 INJECTION, SOLUTION INTRAMUSCULAR; INTRAVENOUS
Status: DISCONTINUED | OUTPATIENT
Start: 2025-01-25 | End: 2025-01-29

## 2025-01-25 RX ORDER — IPRATROPIUM BROMIDE AND ALBUTEROL SULFATE 2.5; .5 MG/3ML; MG/3ML
3 SOLUTION RESPIRATORY (INHALATION)
Status: DISCONTINUED | OUTPATIENT
Start: 2025-01-25 | End: 2025-02-10

## 2025-01-25 RX ORDER — ACETAMINOPHEN 650 MG/1
650 SUPPOSITORY RECTAL EVERY 4 HOURS PRN
Status: DISCONTINUED | OUTPATIENT
Start: 2025-01-25 | End: 2025-02-26 | Stop reason: HOSPADM

## 2025-01-25 RX ORDER — ONDANSETRON 4 MG/1
4 TABLET, ORALLY DISINTEGRATING ORAL EVERY 6 HOURS PRN
Status: DISCONTINUED | OUTPATIENT
Start: 2025-01-25 | End: 2025-02-26 | Stop reason: HOSPADM

## 2025-01-25 RX ORDER — ACETAMINOPHEN 325 MG/1
650 TABLET ORAL EVERY 4 HOURS PRN
Status: DISCONTINUED | OUTPATIENT
Start: 2025-01-25 | End: 2025-02-26 | Stop reason: HOSPADM

## 2025-01-25 RX ORDER — BUDESONIDE 1 MG/2ML
1 INHALANT ORAL 2 TIMES DAILY
Status: DISCONTINUED | OUTPATIENT
Start: 2025-01-25 | End: 2025-02-26 | Stop reason: HOSPADM

## 2025-01-25 RX ORDER — AMOXICILLIN 250 MG
2 CAPSULE ORAL 2 TIMES DAILY PRN
Status: DISCONTINUED | OUTPATIENT
Start: 2025-01-25 | End: 2025-02-26 | Stop reason: HOSPADM

## 2025-01-25 RX ORDER — METOPROLOL TARTRATE 1 MG/ML
2.5-5 INJECTION, SOLUTION INTRAVENOUS EVERY 6 HOURS PRN
Status: DISCONTINUED | OUTPATIENT
Start: 2025-01-25 | End: 2025-02-26 | Stop reason: HOSPADM

## 2025-01-25 RX ORDER — ONDANSETRON 2 MG/ML
4 INJECTION INTRAMUSCULAR; INTRAVENOUS EVERY 6 HOURS PRN
Status: DISCONTINUED | OUTPATIENT
Start: 2025-01-25 | End: 2025-02-26 | Stop reason: HOSPADM

## 2025-01-25 RX ORDER — DEXTROSE MONOHYDRATE 100 MG/ML
INJECTION, SOLUTION INTRAVENOUS CONTINUOUS PRN
Status: DISCONTINUED | OUTPATIENT
Start: 2025-01-25 | End: 2025-02-26 | Stop reason: HOSPADM

## 2025-01-25 RX ORDER — AMOXICILLIN 250 MG
1 CAPSULE ORAL 2 TIMES DAILY PRN
Status: DISCONTINUED | OUTPATIENT
Start: 2025-01-25 | End: 2025-02-26 | Stop reason: HOSPADM

## 2025-01-25 RX ORDER — CALCIUM CARBONATE 500 MG/1
1000 TABLET, CHEWABLE ORAL 4 TIMES DAILY PRN
Status: DISCONTINUED | OUTPATIENT
Start: 2025-01-25 | End: 2025-02-26 | Stop reason: HOSPADM

## 2025-01-25 RX ORDER — TACROLIMUS 1 MG/G
OINTMENT TOPICAL 2 TIMES DAILY
Status: DISCONTINUED | OUTPATIENT
Start: 2025-01-25 | End: 2025-02-26 | Stop reason: HOSPADM

## 2025-01-25 RX ORDER — ALBUTEROL SULFATE 90 UG/1
2 INHALANT RESPIRATORY (INHALATION) EVERY 6 HOURS PRN
Status: DISCONTINUED | OUTPATIENT
Start: 2025-01-25 | End: 2025-02-26 | Stop reason: HOSPADM

## 2025-01-25 RX ORDER — NALOXONE HYDROCHLORIDE 0.4 MG/ML
0.4 INJECTION, SOLUTION INTRAMUSCULAR; INTRAVENOUS; SUBCUTANEOUS
Status: DISCONTINUED | OUTPATIENT
Start: 2025-01-25 | End: 2025-02-26 | Stop reason: HOSPADM

## 2025-01-25 RX ORDER — SEVELAMER CARBONATE 800 MG/1
800 TABLET, FILM COATED ORAL
Status: DISCONTINUED | OUTPATIENT
Start: 2025-01-25 | End: 2025-02-01

## 2025-01-25 RX ORDER — CHLORHEXIDINE GLUCONATE ORAL RINSE 1.2 MG/ML
15 SOLUTION DENTAL EVERY 12 HOURS
Status: DISCONTINUED | OUTPATIENT
Start: 2025-01-25 | End: 2025-01-25

## 2025-01-25 RX ORDER — CHOLECALCIFEROL (VITAMIN D3) 50 MCG
50 TABLET ORAL DAILY
Status: DISCONTINUED | OUTPATIENT
Start: 2025-01-26 | End: 2025-02-26 | Stop reason: HOSPADM

## 2025-01-25 RX ORDER — B COMPLEX C NO.10/FOLIC ACID 900MCG/5ML
5 LIQUID (ML) ORAL DAILY
Status: DISCONTINUED | OUTPATIENT
Start: 2025-01-26 | End: 2025-02-26 | Stop reason: HOSPADM

## 2025-01-25 RX ORDER — ATORVASTATIN CALCIUM 20 MG/1
20 TABLET, FILM COATED ORAL EVERY EVENING
Status: DISCONTINUED | OUTPATIENT
Start: 2025-01-25 | End: 2025-02-26 | Stop reason: HOSPADM

## 2025-01-25 RX ORDER — CETIRIZINE HYDROCHLORIDE 5 MG/1
5 TABLET ORAL AT BEDTIME
Status: DISCONTINUED | OUTPATIENT
Start: 2025-01-25 | End: 2025-02-26 | Stop reason: HOSPADM

## 2025-01-25 RX ADMIN — DEXAMETHASONE SODIUM PHOSPHATE 10 MG: 10 INJECTION, SOLUTION INTRAMUSCULAR; INTRAVENOUS at 06:16

## 2025-01-25 RX ADMIN — AMIODARONE HYDROCHLORIDE 400 MG: 200 TABLET ORAL at 20:31

## 2025-01-25 RX ADMIN — LIDOCAINE HYDROCHLORIDE 0.5 ML: 10 INJECTION, SOLUTION EPIDURAL; INFILTRATION; INTRACAUDAL; PERINEURAL at 14:31

## 2025-01-25 RX ADMIN — IPRATROPIUM BROMIDE AND ALBUTEROL SULFATE 3 ML: .5; 3 SOLUTION RESPIRATORY (INHALATION) at 19:29

## 2025-01-25 RX ADMIN — DEXMEDETOMIDINE HYDROCHLORIDE 0.4 MCG/KG/HR: 400 INJECTION INTRAVENOUS at 10:12

## 2025-01-25 RX ADMIN — CETIRIZINE HYDROCHLORIDE 5 MG: 5 TABLET ORAL at 22:09

## 2025-01-25 RX ADMIN — HEPARIN SODIUM 1550 UNITS/HR: 10000 INJECTION, SOLUTION INTRAVENOUS at 22:55

## 2025-01-25 RX ADMIN — INSULIN HUMAN 8 UNITS/HR: 1 INJECTION, SOLUTION INTRAVENOUS at 10:50

## 2025-01-25 RX ADMIN — MICONAZOLE NITRATE: 2 POWDER TOPICAL at 08:04

## 2025-01-25 RX ADMIN — SEVELAMER CARBONATE 800 MG: 800 TABLET, FILM COATED ORAL at 08:03

## 2025-01-25 RX ADMIN — IPRATROPIUM BROMIDE AND ALBUTEROL SULFATE 3 ML: .5; 3 SOLUTION RESPIRATORY (INHALATION) at 11:09

## 2025-01-25 RX ADMIN — SERTRALINE HYDROCHLORIDE 75 MG: 50 TABLET ORAL at 20:31

## 2025-01-25 RX ADMIN — SODIUM CHLORIDE 500 ML: 9 INJECTION, SOLUTION INTRAVENOUS at 14:32

## 2025-01-25 RX ADMIN — CHLORHEXIDINE GLUCONATE 15 ML: 1.2 RINSE ORAL at 08:03

## 2025-01-25 RX ADMIN — DEXAMETHASONE SODIUM PHOSPHATE 10 MG: 10 INJECTION, SOLUTION INTRAMUSCULAR; INTRAVENOUS at 18:25

## 2025-01-25 RX ADMIN — ACETAMINOPHEN 650 MG: 325 TABLET, FILM COATED ORAL at 08:05

## 2025-01-25 RX ADMIN — AMIODARONE HYDROCHLORIDE 400 MG: 200 TABLET ORAL at 08:03

## 2025-01-25 RX ADMIN — DEXMEDETOMIDINE HYDROCHLORIDE 0.7 MCG/KG/HR: 400 INJECTION INTRAVENOUS at 18:36

## 2025-01-25 RX ADMIN — Medication 50 MCG: at 08:03

## 2025-01-25 RX ADMIN — HEPARIN SODIUM 1550 UNITS/HR: 10000 INJECTION, SOLUTION INTRAVENOUS at 06:24

## 2025-01-25 RX ADMIN — ATORVASTATIN CALCIUM 20 MG: 20 TABLET, FILM COATED ORAL at 20:30

## 2025-01-25 RX ADMIN — IPRATROPIUM BROMIDE AND ALBUTEROL SULFATE 3 ML: .5; 3 SOLUTION RESPIRATORY (INHALATION) at 07:15

## 2025-01-25 RX ADMIN — SODIUM CHLORIDE 200 ML: 9 INJECTION, SOLUTION INTRAVENOUS at 14:32

## 2025-01-25 RX ADMIN — MICONAZOLE NITRATE: 2 POWDER TOPICAL at 20:32

## 2025-01-25 RX ADMIN — TACROLIMUS: 1 OINTMENT TOPICAL at 08:04

## 2025-01-25 RX ADMIN — Medication 40 MG: at 08:02

## 2025-01-25 RX ADMIN — ALBUMIN HUMAN 250 ML: 0.05 INJECTION, SOLUTION INTRAVENOUS at 14:30

## 2025-01-25 RX ADMIN — DEXAMETHASONE SODIUM PHOSPHATE 10 MG: 10 INJECTION, SOLUTION INTRAMUSCULAR; INTRAVENOUS at 12:08

## 2025-01-25 RX ADMIN — IPRATROPIUM BROMIDE AND ALBUTEROL SULFATE 3 ML: .5; 3 SOLUTION RESPIRATORY (INHALATION) at 14:58

## 2025-01-25 RX ADMIN — Medication 5 ML: at 08:03

## 2025-01-25 RX ADMIN — DEXMEDETOMIDINE HYDROCHLORIDE 1 MCG/KG/HR: 400 INJECTION INTRAVENOUS at 22:56

## 2025-01-25 RX ADMIN — TACROLIMUS: 1 OINTMENT TOPICAL at 20:32

## 2025-01-25 RX ADMIN — HEPARIN SODIUM 500 UNITS/HR: 1000 INJECTION, SOLUTION INTRAVENOUS; SUBCUTANEOUS at 14:31

## 2025-01-25 ASSESSMENT — ACTIVITIES OF DAILY LIVING (ADL)
ADLS_ACUITY_SCORE: 81

## 2025-01-25 NOTE — PROGRESS NOTES
ICU Progress Note   Date of Service: 01/25/25    Assessment and Plan:  4 year old female with PMHx significant for ESRD on HD, CHF, COPD, poor Mobility (L AKA, Obesity, WC bound), DM type II, hypertension.  She presented to the ER on 1/8/25 complaining of shortness of breath and was diagnosed with Influenza A. On 1/9/25 she was transferred to the ICU and intubated due to worsening acute on chronic respiratory failure. On the evening of 1/18/25 she was extubated but she was reintubated on 1/20.      Overnight events:   Passed SBT yesterday but did not have cuff leak so started on steroids and extubation was postponed      Neuro  Intubated  Likely baseline cognitive impairment  - Precedex for sedation for mechanical ventilation  - PTA Zoloft, gabapentin     Pulmonary  Acute hypoxemic respiratory failure  Influenza A  COPD exacerbation  Hx of JONE  ? Hospital acquired pneumonia  Upper airway swelling/ ? Allergic reaction  - Passed SBT 1/24, but no airleak noted on deflation of the balloon  - Continue high dose sterdoids with dexa 10 mg q6  - Evidence of cuff leak this am  - SBT after iHD and plan to extubate if passes  - Continue nebs  - off pulmicort nebs while on high dose steroids  - Broad spectrum antibiotics as per ID section     Cardiac  Shock, likely due to sedation (improved)  Afib now rate controlled  - MAP goal > 65, currently off norepi  - Transition amio gtt to PO  - IV heparin gtt, transition to DOAC when more stable     Renal  ESRD on HD  - Nephrology consult, appreciate recs  - iHD as per schedule  - Appears to be closer to euvolemia      GI  No active issues  - RD to manage TFs     Heme/Onc  Chronic anemia  Hx of Afib  - monitor CBC  - Transfuse for Hgb < 7  - Heparin gtt     ID  Influenza A pneumona  ? Hospital acquired pneumonia  - Tamiflu treatment completed  - Cefepime/Vanc restarted 1/20 after bronch  - BAL cultures with actinomyces, CXR showing focal infiltrates will repeat CT chest to assess for  "actinomycosis though suspicion is low and actinomyces could be just colonizers   - given upper airway swelling and hx of PCN allergy, discontinue cefepime and start levofloxacin to complete 7 days of therapy for HAP     Endo  Hx of DM  - monitor BG  - insulin gtt         PPX  1. DVT: heparin gtt   2. VAP: HOB 30 degrees, chlorhexidine rinse  3. Stress Ulcer: PPI  4. Restraints: Nonviolent soft two point restraints required and necessary for patient safety and continued cares and good effect as patient continues to pull at necessary lines, tubes despite education and distraction. Will readdress daily.   5. Wound care - per unit routine   6. Feeding - TF  7. Family update pending     Dispo: ICU      BP (!) 150/78   Pulse 79   Temp 97.4  F (36.3  C) (Axillary)   Resp 18   Ht 1.702 m (5' 7\")   Wt 102.8 kg (226 lb 10.1 oz)   LMP  (LMP Unknown)   SpO2 100%   BMI 35.50 kg/m      FiO2 (%): 30 %, Resp: 18, Vent Mode: CMV/AC, Resp Rate (Set): 12 breaths/min, Tidal Volume (Set, mL): 400 mL, PEEP (cm H2O): 8 cmH2O, Pressure Support (cm H2O): 5 cmH2O, Resp Rate (Set): 12 breaths/min, Tidal Volume (Set, mL): 400 mL, PEEP (cm H2O): 8 cmH2O      Intake/Output Summary (Last 24 hours) at 1/25/2025 1104  Last data filed at 1/25/2025 1000  Gross per 24 hour   Intake 2397.07 ml   Output --   Net 2397.07 ml       Physical Exam:  Gen: Laying in bed in NAD  HEENT: NC AT  Resp: intubated, B/L air entry, some rhonchi  CVS: S1 S2, regular rhythm on tele  Abd: Soft NT ND  Ext: no swelling noted  Neuro: awake and alert, following commands    Labs: reviewed    Imaging: reviewed    Past Medical/Surgical history     Family history     Social history     Time Spent on this Encounter   Supriya was seen and evaluated by me on 01/25/25.  She was in critical condition as the result of acute hypoxic respiratory failure.    Her condition is now Fair.      The acute issues managed by me today include acute hypoxic resp failure, upper " airway/pharyngeal swelling, ESRD, HAP, and afib   Supportive interventions provided and/or ordered by me include mechanical ventilation, iv antibiotics, amiodarone, anticoagulation and steroids    Total Critical Care time spent by me, excluding procedures, was 45 minutes.    Cheo Watt MD

## 2025-01-25 NOTE — PLAN OF CARE
[facility] Intensive Care Unit   Nursing Note                                                 Neuro:  PERRL.  Moves all extremities.  Follows commands. Answers questions with head movements.  Cardiovascular:  IRRR.  Hemodynamically stable off pressors.  Pulmonary:  Tolerating ventilator on Precedex.  Oxygen saturation > 92  GI/:  Anuric.  Endocrine: Glucose well controlled with insulin drip.  Skin:  Intact except incisional areas.  Protective mepilex applied to sacrum.  Restraints:  Not in use or necessary.  AM labs noted; electrolytes replaced as indicated.  No change in heparin drip today.  Continue to monitor closely.    Recent Labs   Lab 01/20/25  1541 01/20/25  0830   O2PER 50 5         ROUTINE IP LABS (Last four results)  BMP  Recent Labs   Lab 01/25/25  0412 01/25/25  0411 01/25/25  0230 01/24/25  2351 01/24/25  0554 01/24/25  0413 01/23/25  0743 01/23/25  0539 01/22/25  0819 01/22/25  0556     --   --   --   --  136  --  134*  --  135   POTASSIUM 5.1  --   --   --   --  4.0  --  3.9  --  3.7   CHLORIDE 98  --   --   --   --  97*  --  94*  --  95*   JODY 10.3  --   --   --   --  9.8  --  10.1  --  10.1   CO2 24  --   --   --   --  26  --  25  --  26   BUN 64.5*  --   --   --   --  41.3*  --  74.5*  --  48.6*   CR 3.60*  --   --   --   --  2.66*  --  4.19*  --  3.29*   * 126* 120* 117*   < > 122*   < > 138*   < > 314*    < > = values in this interval not displayed.     CBC  Recent Labs   Lab 01/25/25  0412 01/24/25  0413 01/23/25  0539 01/22/25  0556   WBC 3.9* 6.2 6.3 7.5   RBC 2.48* 2.39* 2.48* 2.52*   HGB 7.9* 7.7* 8.0* 7.9*   HCT 25.0* 24.2* 24.9* 25.3*   * 101* 100 100   MCH 31.9 32.2 32.3 31.3   MCHC 31.6 31.8 32.1 31.2*   RDW 18.0* 18.3* 18.2* 18.1*    171 193 199       LACTIC ACID  Lactic Acid (mmol/L)   Date Value   01/20/2025 0.9   05/19/2018 0.6 (L)   11/01/2017 0.6 (L)     Lactic Acid, Initial (mmol/L)   Date Value   01/08/2025 0.5 (L)       Intake/Output  Summary (Last 24 hours) at 1/25/2025 0601  Last data filed at 1/25/2025 0400  Gross per 24 hour   Intake 2457.14 ml   Output --   Net 2457.14 ml       Santi Matos MSN RN PHN CCRN  [facility]  Intensive Care Unit              Problem: Adult Inpatient Plan of Care  Goal: Plan of Care Review  Description: The Plan of Care Review/Shift note should be completed every shift.  The Outcome Evaluation is a brief statement about your assessment that the patient is improving, declining, or no change.  This information will be displayed automatically on your shift  note.  Outcome: Progressing     Problem: Pain Acute  Goal: Optimal Pain Control and Function  Outcome: Progressing     Problem: Gas Exchange Impaired  Goal: Optimal Gas Exchange  Outcome: Progressing     Problem: Mechanical Ventilation Invasive  Goal: Mechanical Ventilation Liberation  Intervention: Promote Extubation and Mechanical Ventilation Liberation  Recent Flowsheet Documentation  Taken 1/25/2025 0400 by Santi Matos, RN  Sleep/Rest Enhancement:   awakenings minimized   comfort measures   consistent schedule promoted   music provided   medication   natural light exposure provided   noise level reduced   regular sleep/rest pattern promoted   relaxation techniques promoted   visualization  Medication Review/Management: medications reviewed  Environmental Support:   calm environment promoted   comfort object encouraged   distractions minimized   environmental consistency promoted   rest periods encouraged  Taken 1/25/2025 0000 by Santi Matos RN  Sleep/Rest Enhancement:   awakenings minimized   comfort measures   consistent schedule promoted   music provided   medication   natural light exposure provided   noise level reduced   regular sleep/rest pattern promoted   relaxation techniques promoted   visualization  Medication Review/Management: medications reviewed  Environmental Support:   calm environment promoted   comfort object  encouraged   distractions minimized   environmental consistency promoted   rest periods encouraged  Taken 1/24/2025 2200 by Santi Matos RN  Sleep/Rest Enhancement:   awakenings minimized   comfort measures   consistent schedule promoted   music provided   medication   natural light exposure provided   noise level reduced   regular sleep/rest pattern promoted   relaxation techniques promoted   visualization  Medication Review/Management: medications reviewed  Environmental Support:   calm environment promoted   comfort object encouraged   distractions minimized   environmental consistency promoted   rest periods encouraged  Taken 1/24/2025 2000 by Santi Matos RN  Sleep/Rest Enhancement:   awakenings minimized   comfort measures   consistent schedule promoted   music provided   medication   natural light exposure provided   noise level reduced   regular sleep/rest pattern promoted   relaxation techniques promoted   visualization  Medication Review/Management: medications reviewed  Environmental Support:   calm environment promoted   comfort object encouraged   distractions minimized   environmental consistency promoted   rest periods encouraged   Goal Outcome Evaluation:

## 2025-01-25 NOTE — PROGRESS NOTES
[facility] ICU RESPIRATORY NOTE        Date of Admission: 1/8/2025    Date of Intubation (most recent): 1/20/25    Reason for Mechanical Ventilation: RF    Number of Days on Mechanical Ventilation: 6    Met Criteria for Spontaneous Breathing Trial: No    Reason for No Spontaneous Breathing Trial: Per MD    Bite Block: No    Significant Events Today: None     ABG Results:   Recent Labs   Lab 01/20/25  1541 01/20/25  0830   O2PER 50 5         Current Vent Settings: FiO2 (%): 30 %, Resp: 22, Vent Mode: CMV/AC, Resp Rate (Set): 12 breaths/min, Tidal Volume (Set, mL): 400 mL, PEEP (cm H2O): 8 cmH2O, Pressure Support (cm H2O): 5 cmH2O, Resp Rate (Set): 12 breaths/min, Tidal Volume (Set, mL): 400 mL, PEEP (cm H2O): 8 cmH2O    Skin Assessment: Intact    Plan: We will continue with the wean today and assess for extubation.    Phyllis Schafer RT on 1/25/2025 at 4:19 AM

## 2025-01-25 NOTE — PROGRESS NOTES
Renal Medicine Inpatient Dialysis Note                                Supriya Herr MRN# [id number]   Age: 75 year old YOB: 1950   Date of Admission: 1/8/2025 Hospital LOS: 17          Assessment/Plan:     1) Influenza A with hypoxic resp failure.   On oseltamivir at ESRD dose.   Extubated   Remains above target weight although not overtly very hypervolemic.     2) end-stage renal disease   Chronic dialysis at [facility], Tuesday Thursday Saturday   Primary nephrologist Dr. Basilio  Access: Right AV fistula,   Run time 3.5 hours as outpatient     3) Anemia: HGB today 9. 2, stable she is on Mircera as outpt.  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  Vit D 2000 units daily.  Renvela 1600 mg TID c meals and 800 mg with snacks.      TTS schedule   Next 01.28.25      Interval History:     Re intubated 01.20.25    Scheduled run with UF as tolerated to 2.5 liter  Low dose NE    Dialysis run parameters reviewed with dialysis RN at patient bedside.    3.5 hours  2K  UF as tolerated   5% albumin prime     ROS     Intubated and sedated: ROS unable    Dialysis Parameters:     Vitals were reviewed  Patient Vitals for the past 8 hrs:   BP Temp Temp src Pulse Resp SpO2   01/25/25 1300 136/66 -- -- 93 18 99 %   01/25/25 1200 135/78 98.4  F (36.9  C) Axillary 96 23 100 %   01/25/25 1100 (!) 146/60 -- -- 96 22 100 %   01/25/25 1000 (!) 150/78 -- -- 79 18 100 %   01/25/25 0900 (!) 143/59 -- -- 90 19 100 %   01/25/25 0805 -- -- -- 96 18 100 %   01/25/25 0800 131/65 97.4  F (36.3  C) Axillary 78 17 100 %   01/25/25 0700 -- -- -- 79 19 100 %   01/25/25 0600 -- -- -- 74 22 100 %     Wt Readings from Last 4 Encounters:   01/25/25 102.8 kg (226 lb 10.1 oz)   06/14/24 101.1 kg (222 lb 14.2 oz)   06/03/24 101.1 kg (222 lb 14.2 oz)   04/24/24 101.1 kg (222 lb 14.2 oz)     I/O last 3 completed  "shifts:  In: 2626.14 [I.V.:901.14; NG/GT:405]  Out: -     Vitals:    01/20/25 0500 01/21/25 0400 01/23/25 0000 01/24/25 0200   Weight: 100.4 kg (221 lb 5.5 oz) 96 kg (211 lb 10.3 oz) 101 kg (222 lb 10.6 oz) 102 kg (224 lb 13.9 oz)    01/25/25 0500   Weight: 102.8 kg (226 lb 10.1 oz)       Current Weight: 102.8  Dry Weight: 96?  Dialysis Temp: 36.5  C  Access Device: AVF  Access Site: right  Dialyzer: Revaclear  Dialysis Bath: 4  Sodium Profile: n  UF Goal: 3  Blood Flow Rate (mL/min): 400  Total Treatment Time (hrs): 3.5  Heparin: Low dose as required      EPO dose: n  Zemplar: n  IV Fe: n      Medications and Allergies:     Reviewed      Physical Exam:     Seen and examined during course of dialysis run    /66   Pulse 93   Temp 98.4  F (36.9  C) (Axillary)   Resp 18   Ht 1.702 m (5' 7\")   Wt 102.8 kg (226 lb 10.1 oz)   LMP  (LMP Unknown)   SpO2 99%   BMI 35.50 kg/m      GENERAL: intubated  HEENT: ETT  RESP: clear anteriorly  CV: RRR, normal S1 S2  MS: no edema  EXT: access canulated without issue    Data:       Recent Labs   Lab 01/25/25  1310 01/25/25  0621 01/25/25  0412   NA  --   --  136   POTASSIUM  --   --  5.1   CHLORIDE  --   --  98   CO2  --   --  24   ANIONGAP  --   --  14   *   < > 124*   BUN  --   --  64.5*   CR  --   --  3.60*   GFRESTIMATED  --   --  12*   JODY  --   --  10.3    < > = values in this interval not displayed.     Recent Labs   Lab 01/25/25  0412 01/24/25  0413   POTASSIUM 5.1 4.0     No lab results found in last 7 days.    Recent Labs   Lab 01/25/25  0412 01/24/25  0413 01/23/25  0539 01/22/25  0556 01/20/25  1845 01/20/25  0539   PHOS  --   --   --   --   --  7.8*   HGB 7.9* 7.7* 8.0* 7.9*   < > 10.2*    < > = values in this interval not displayed.         G Dereck Hawley MD    [facility] - Nephrology  [phone]           "

## 2025-01-25 NOTE — PROGRESS NOTES
Potassium   Date Value Ref Range Status   01/25/2025 5.1 3.4 - 5.3 mmol/L Final   02/02/2022 4.7 3.4 - 5.3 mmol/L Final   04/17/2021 4.2 3.4 - 5.3 mmol/L Final     Hemoglobin   Date Value Ref Range Status   01/25/2025 7.9 (L) 11.7 - 15.7 g/dL Final   04/16/2021 10.9 (L) 11.7 - 15.7 g/dL Final     Creatinine   Date Value Ref Range Status   01/25/2025 3.60 (H) 0.51 - 0.95 mg/dL Final   04/17/2021 5.98 (H) 0.52 - 1.04 mg/dL Final     Urea Nitrogen   Date Value Ref Range Status   01/25/2025 64.5 (H) 8.0 - 23.0 mg/dL Final   11/24/2021 37 (H) 7 - 30 mg/dL Final   04/17/2021 68 (H) 7 - 30 mg/dL Final     Sodium   Date Value Ref Range Status   01/25/2025 136 135 - 145 mmol/L Final   04/17/2021 137 133 - 144 mmol/L Final     INR   Date Value Ref Range Status   04/24/2024 1.11 0.85 - 1.15 Final   04/16/2021 1.14 0.86 - 1.14 Final       DIALYSIS PROCEDURE NOTE  Hepatitis status of previous patient on machine log was checked and verified ok to use with this patients hepatitis status.  Patient dialyzed for 3.5 hrs. on a K2 bath with a net fluid removal of  2L.  A BFR of 400 ml/min was obtained via a LUE AVF using 15 gauge needles.      The treatment plan was discussed with Dr. Hawley during the treatment.    Total heparin received during the treatment: 1200 units.   Needle cannulation sites held x 10 min.     Meds given: Albumin 5% prime, Lidocaine prior to cannulation, Heparin (see MAR)   Complications: None      Person educated: Pt. Knowledge base CALLIE d/t ETT. Barriers to learning: ETT. Educated on procedure via verbal mode. The patient unable to verbalize understanding.   ICEBOAT? Timeout performed pre-treatment  I: Patient was identified using 2 identifiers  C:  Consent Signed Yes  E: Equipment preventative maintenance is current and dialysis delivery system OK to use  B: Hepatitis B Surface Antigen: Non-reactive; Draw Date: 1/8/25      Hepatitis B Surface Antibody: Susceptible; Draw Date: 1/9/25  O: Dialysis orders  present and complete prior to treatment  A: Vascular access verified and assessed prior to treatment  T: Treatment was performed at a clinically appropriate time  ?: Patient was allowed to ask questions and address concerns prior to treatment  See Adult Hemodialysis flowsheet in [facility] for further details and post assessment.  Machine water alarm in place and functioning. Transducer pods intact and checked every 15min.   Pt assisted with repositioning throughout dialysis treatment.  Pt dialyzed at bedside in ICU.  Chlorine/Chloramine water system checked every 4 hours.  Outpatient Dialysis at [facility] on TTS.      Post treatment report given to bedside RN (see flowsheet) regarding 2L of fluid removed, last BP ***    Please remove patient dressing on AVF and AVG needle sites 24 hours after dialysis. If leaking occurs please apply a Band-Aid.     Yen Pierce Dialysis RN

## 2025-01-25 NOTE — PROGRESS NOTES
[facility] ICU RESPIRATORY NOTE        Date of Admission: 1/8/2025    Date of Intubation (most recent): 1/20/25    Reason for Mechanical Ventilation: Respiratory failure    Number of Days on Mechanical Ventilation: 6    Met Criteria for Spontaneous Breathing Trial: Yes    Bite Block: No    Significant Events Today: PSV 8/5 started at 1500 and remains on those settings at this time.     ABG Results:   Recent Labs   Lab 01/20/25  1541 01/20/25  0830   O2PER 50 5         Current Vent Settings: FiO2 (%): 30 %, Resp: (!) 38, Vent Mode: (S) CPAP/PS, Resp Rate (Set): 12 breaths/min, Tidal Volume (Set, mL): 400 mL, PEEP (cm H2O): 8 cmH2O, Pressure Support (cm H2O): 5 cmH2O, Resp Rate (Set): 12 breaths/min, Tidal Volume (Set, mL): 400 mL, PEEP (cm H2O): 8 cmH2O    Skin Assessment: Skin integrity is good.    Plan: Continue PSV for this evening. Possible extubation this evening.     Kerri Ambrosio RT on 1/25/2025 at 5:29 PM

## 2025-01-26 LAB
ALBUMIN SERPL BCG-MCNC: 3.2 G/DL (ref 3.5–5.2)
ALBUMIN SERPL BCG-MCNC: 3.4 G/DL (ref 3.5–5.2)
ALP SERPL-CCNC: 49 U/L (ref 40–150)
ALP SERPL-CCNC: 49 U/L (ref 40–150)
ALT SERPL W P-5'-P-CCNC: 61 U/L (ref 0–50)
ALT SERPL W P-5'-P-CCNC: 63 U/L (ref 0–50)
ANION GAP SERPL CALCULATED.3IONS-SCNC: 13 MMOL/L (ref 7–15)
AST SERPL W P-5'-P-CCNC: 21 U/L (ref 0–45)
AST SERPL W P-5'-P-CCNC: 27 U/L (ref 0–45)
BILIRUB DIRECT SERPL-MCNC: <0.2 MG/DL (ref 0–0.3)
BILIRUB SERPL-MCNC: 0.2 MG/DL
BILIRUB SERPL-MCNC: 0.2 MG/DL
BUN SERPL-MCNC: 54.7 MG/DL (ref 8–23)
CALCIUM SERPL-MCNC: 10.5 MG/DL (ref 8.8–10.4)
CHLORIDE SERPL-SCNC: 95 MMOL/L (ref 98–107)
CREAT SERPL-MCNC: 2.55 MG/DL (ref 0.51–0.95)
EGFRCR SERPLBLD CKD-EPI 2021: 19 ML/MIN/1.73M2
ERYTHROCYTE [DISTWIDTH] IN BLOOD BY AUTOMATED COUNT: 18 % (ref 10–15)
GLUCOSE BLDC GLUCOMTR-MCNC: 106 MG/DL (ref 70–99)
GLUCOSE BLDC GLUCOMTR-MCNC: 129 MG/DL (ref 70–99)
GLUCOSE BLDC GLUCOMTR-MCNC: 129 MG/DL (ref 70–99)
GLUCOSE BLDC GLUCOMTR-MCNC: 134 MG/DL (ref 70–99)
GLUCOSE BLDC GLUCOMTR-MCNC: 134 MG/DL (ref 70–99)
GLUCOSE BLDC GLUCOMTR-MCNC: 137 MG/DL (ref 70–99)
GLUCOSE BLDC GLUCOMTR-MCNC: 138 MG/DL (ref 70–99)
GLUCOSE BLDC GLUCOMTR-MCNC: 142 MG/DL (ref 70–99)
GLUCOSE BLDC GLUCOMTR-MCNC: 143 MG/DL (ref 70–99)
GLUCOSE BLDC GLUCOMTR-MCNC: 144 MG/DL (ref 70–99)
GLUCOSE BLDC GLUCOMTR-MCNC: 145 MG/DL (ref 70–99)
GLUCOSE BLDC GLUCOMTR-MCNC: 145 MG/DL (ref 70–99)
GLUCOSE BLDC GLUCOMTR-MCNC: 149 MG/DL (ref 70–99)
GLUCOSE BLDC GLUCOMTR-MCNC: 152 MG/DL (ref 70–99)
GLUCOSE BLDC GLUCOMTR-MCNC: 153 MG/DL (ref 70–99)
GLUCOSE BLDC GLUCOMTR-MCNC: 154 MG/DL (ref 70–99)
GLUCOSE BLDC GLUCOMTR-MCNC: 154 MG/DL (ref 70–99)
GLUCOSE BLDC GLUCOMTR-MCNC: 161 MG/DL (ref 70–99)
GLUCOSE SERPL-MCNC: 167 MG/DL (ref 70–99)
HCO3 SERPL-SCNC: 26 MMOL/L (ref 22–29)
HCT VFR BLD AUTO: 25.9 % (ref 35–47)
HGB BLD-MCNC: 8.2 G/DL (ref 11.7–15.7)
MAGNESIUM SERPL-MCNC: 2 MG/DL (ref 1.7–2.3)
MCH RBC QN AUTO: 31.5 PG (ref 26.5–33)
MCHC RBC AUTO-ENTMCNC: 31.7 G/DL (ref 31.5–36.5)
MCV RBC AUTO: 100 FL (ref 78–100)
PHOSPHATE SERPL-MCNC: 3.9 MG/DL (ref 2.5–4.5)
PLATELET # BLD AUTO: 178 10E3/UL (ref 150–450)
POTASSIUM SERPL-SCNC: 4.5 MMOL/L (ref 3.4–5.3)
PROT SERPL-MCNC: 5.8 G/DL (ref 6.4–8.3)
PROT SERPL-MCNC: 6.1 G/DL (ref 6.4–8.3)
RBC # BLD AUTO: 2.6 10E6/UL (ref 3.8–5.2)
SODIUM SERPL-SCNC: 134 MMOL/L (ref 135–145)
UFH PPP CHRO-ACNC: 0.3 IU/ML
UFH PPP CHRO-ACNC: 0.42 IU/ML
UFH PPP CHRO-ACNC: 0.65 IU/ML
WBC # BLD AUTO: 4.3 10E3/UL (ref 4–11)

## 2025-01-26 PROCEDURE — 85014 HEMATOCRIT: CPT | Performed by: INTERNAL MEDICINE

## 2025-01-26 PROCEDURE — 85520 HEPARIN ASSAY: CPT | Performed by: INTERNAL MEDICINE

## 2025-01-26 PROCEDURE — 82247 BILIRUBIN TOTAL: CPT | Performed by: INTERNAL MEDICINE

## 2025-01-26 PROCEDURE — 999N000157 HC STATISTIC RCP TIME EA 10 MIN

## 2025-01-26 PROCEDURE — 250N000011 HC RX IP 250 OP 636: Performed by: INTERNAL MEDICINE

## 2025-01-26 PROCEDURE — 84100 ASSAY OF PHOSPHORUS: CPT | Performed by: INTERNAL MEDICINE

## 2025-01-26 PROCEDURE — 94640 AIRWAY INHALATION TREATMENT: CPT | Mod: 76

## 2025-01-26 PROCEDURE — 84132 ASSAY OF SERUM POTASSIUM: CPT | Performed by: INTERNAL MEDICINE

## 2025-01-26 PROCEDURE — 250N000009 HC RX 250

## 2025-01-26 PROCEDURE — 85041 AUTOMATED RBC COUNT: CPT | Performed by: INTERNAL MEDICINE

## 2025-01-26 PROCEDURE — 99231 SBSQ HOSP IP/OBS SF/LOW 25: CPT | Performed by: INTERNAL MEDICINE

## 2025-01-26 PROCEDURE — 83735 ASSAY OF MAGNESIUM: CPT | Performed by: INTERNAL MEDICINE

## 2025-01-26 PROCEDURE — 84155 ASSAY OF PROTEIN SERUM: CPT | Performed by: INTERNAL MEDICINE

## 2025-01-26 PROCEDURE — 94660 CPAP INITIATION&MGMT: CPT

## 2025-01-26 PROCEDURE — 99291 CRITICAL CARE FIRST HOUR: CPT | Performed by: INTERNAL MEDICINE

## 2025-01-26 PROCEDURE — 94640 AIRWAY INHALATION TREATMENT: CPT

## 2025-01-26 PROCEDURE — 200N000001 HC R&B ICU

## 2025-01-26 PROCEDURE — 250N000013 HC RX MED GY IP 250 OP 250 PS 637: Performed by: INTERNAL MEDICINE

## 2025-01-26 PROCEDURE — 250N000009 HC RX 250: Performed by: INTERNAL MEDICINE

## 2025-01-26 RX ORDER — DEXAMETHASONE SODIUM PHOSPHATE 10 MG/ML
10 INJECTION, SOLUTION INTRAMUSCULAR; INTRAVENOUS EVERY 6 HOURS
Status: COMPLETED | OUTPATIENT
Start: 2025-01-26 | End: 2025-01-27

## 2025-01-26 RX ADMIN — DEXMEDETOMIDINE HYDROCHLORIDE 1 MCG/KG/HR: 400 INJECTION INTRAVENOUS at 07:01

## 2025-01-26 RX ADMIN — TACROLIMUS: 1 OINTMENT TOPICAL at 08:08

## 2025-01-26 RX ADMIN — SEVELAMER CARBONATE 800 MG: 800 TABLET, FILM COATED ORAL at 17:05

## 2025-01-26 RX ADMIN — IPRATROPIUM BROMIDE AND ALBUTEROL SULFATE 3 ML: .5; 3 SOLUTION RESPIRATORY (INHALATION) at 11:24

## 2025-01-26 RX ADMIN — DEXAMETHASONE SODIUM PHOSPHATE 10 MG: 10 INJECTION, SOLUTION INTRAMUSCULAR; INTRAVENOUS at 12:23

## 2025-01-26 RX ADMIN — IPRATROPIUM BROMIDE AND ALBUTEROL SULFATE 3 ML: .5; 3 SOLUTION RESPIRATORY (INHALATION) at 20:47

## 2025-01-26 RX ADMIN — INSULIN HUMAN 4 UNITS/HR: 1 INJECTION, SOLUTION INTRAVENOUS at 11:17

## 2025-01-26 RX ADMIN — RACEPINEPHRINE HYDROCHLORIDE 0.5 ML: 11.25 SOLUTION RESPIRATORY (INHALATION) at 14:05

## 2025-01-26 RX ADMIN — DEXMEDETOMIDINE HYDROCHLORIDE 1 MCG/KG/HR: 400 INJECTION INTRAVENOUS at 03:20

## 2025-01-26 RX ADMIN — IPRATROPIUM BROMIDE AND ALBUTEROL SULFATE 3 ML: .5; 3 SOLUTION RESPIRATORY (INHALATION) at 14:05

## 2025-01-26 RX ADMIN — DEXMEDETOMIDINE HYDROCHLORIDE 0.6 MCG/KG/HR: 400 INJECTION INTRAVENOUS at 17:08

## 2025-01-26 RX ADMIN — MICONAZOLE NITRATE: 2 POWDER TOPICAL at 08:07

## 2025-01-26 RX ADMIN — DEXAMETHASONE SODIUM PHOSPHATE 10 MG: 10 INJECTION, SOLUTION INTRAMUSCULAR; INTRAVENOUS at 17:30

## 2025-01-26 RX ADMIN — AMIODARONE HYDROCHLORIDE 400 MG: 200 TABLET ORAL at 08:12

## 2025-01-26 RX ADMIN — MICONAZOLE NITRATE: 2 POWDER TOPICAL at 20:15

## 2025-01-26 RX ADMIN — Medication 50 MCG: at 08:12

## 2025-01-26 RX ADMIN — METOPROLOL TARTRATE 5 MG: 5 INJECTION INTRAVENOUS at 20:13

## 2025-01-26 RX ADMIN — LEVOFLOXACIN 500 MG: 500 TABLET, FILM COATED ORAL at 17:07

## 2025-01-26 RX ADMIN — IPRATROPIUM BROMIDE AND ALBUTEROL SULFATE 3 ML: .5; 3 SOLUTION RESPIRATORY (INHALATION) at 07:16

## 2025-01-26 RX ADMIN — ATORVASTATIN CALCIUM 20 MG: 20 TABLET, FILM COATED ORAL at 20:12

## 2025-01-26 RX ADMIN — SERTRALINE HYDROCHLORIDE 75 MG: 50 TABLET ORAL at 20:12

## 2025-01-26 RX ADMIN — CETIRIZINE HYDROCHLORIDE 5 MG: 5 TABLET ORAL at 21:52

## 2025-01-26 RX ADMIN — Medication 5 ML: at 08:12

## 2025-01-26 RX ADMIN — Medication 40 MG: at 08:12

## 2025-01-26 RX ADMIN — DEXMEDETOMIDINE HYDROCHLORIDE 0.8 MCG/KG/HR: 400 INJECTION INTRAVENOUS at 23:39

## 2025-01-26 RX ADMIN — DEXAMETHASONE SODIUM PHOSPHATE 10 MG: 10 INJECTION, SOLUTION INTRAMUSCULAR; INTRAVENOUS at 00:44

## 2025-01-26 RX ADMIN — DEXAMETHASONE SODIUM PHOSPHATE 10 MG: 10 INJECTION, SOLUTION INTRAMUSCULAR; INTRAVENOUS at 06:19

## 2025-01-26 RX ADMIN — HEPARIN SODIUM 1350 UNITS/HR: 10000 INJECTION, SOLUTION INTRAVENOUS at 17:04

## 2025-01-26 RX ADMIN — RACEPINEPHRINE HYDROCHLORIDE 0.5 ML: 11.25 SOLUTION RESPIRATORY (INHALATION) at 20:48

## 2025-01-26 RX ADMIN — DEXMEDETOMIDINE HYDROCHLORIDE 0.9 MCG/KG/HR: 400 INJECTION INTRAVENOUS at 11:17

## 2025-01-26 RX ADMIN — TACROLIMUS: 1 OINTMENT TOPICAL at 20:15

## 2025-01-26 RX ADMIN — RACEPINEPHRINE HYDROCHLORIDE 0.5 ML: 11.25 SOLUTION RESPIRATORY (INHALATION) at 12:30

## 2025-01-26 ASSESSMENT — ACTIVITIES OF DAILY LIVING (ADL)
ADLS_ACUITY_SCORE: 79
ADLS_ACUITY_SCORE: 79
ADLS_ACUITY_SCORE: 78
ADLS_ACUITY_SCORE: 79
ADLS_ACUITY_SCORE: 78
ADLS_ACUITY_SCORE: 78
ADLS_ACUITY_SCORE: 79
ADLS_ACUITY_SCORE: 78
ADLS_ACUITY_SCORE: 79
ADLS_ACUITY_SCORE: 78
ADLS_ACUITY_SCORE: 79
ADLS_ACUITY_SCORE: 78
ADLS_ACUITY_SCORE: 79
ADLS_ACUITY_SCORE: 78
ADLS_ACUITY_SCORE: 79
ADLS_ACUITY_SCORE: 78
ADLS_ACUITY_SCORE: 78

## 2025-01-26 NOTE — PLAN OF CARE
Problem: Adult Inpatient Plan of Care  Goal: Plan of Care Review  Description: The Plan of Care Review/Shift note should be completed every shift.  The Outcome Evaluation is a brief statement about your assessment that the patient is improving, declining, or no change.  This information will be displayed automatically on your shift  note.  Outcome: Progressing  Flowsheets (Taken 1/25/2025 1840)  Outcome Evaluation: pt extubated at 1800 to HFNC 40L 45%, calm cooperative, disoriented to situation/time. restless.  precedex continues at 0.7mcg/kg/min. BP/HR stable. dialysis run today- tolerated well. insulin gtt continues. tolerating tube feeds. no BM this shift. daughter at bedside and updated with plan of care by nursing and MD.     Problem: Fall Injury Risk  Goal: Absence of Fall and Fall-Related Injury  Outcome: Progressing  Intervention: Identify and Manage Contributors  Recent Flowsheet Documentation  Taken 1/25/2025 1600 by Marcella Chavis RN  Medication Review/Management: medications reviewed  Taken 1/25/2025 1200 by Marcella Chavis RN  Medication Review/Management: medications reviewed  Taken 1/25/2025 0800 by Marcella Chavis, RN  Medication Review/Management: medications reviewed  Intervention: Promote Injury-Free Environment  Recent Flowsheet Documentation  Taken 1/25/2025 1600 by Marcella Chavis RN  Safety Promotion/Fall Prevention:   activity supervised   increase visualization of patient   lighting adjusted   nonskid shoes/slippers when out of bed   room near nurse's station   room organization consistent   safety round/check completed   supervised activity   clutter free environment maintained   treat reversible contributory factors   treat underlying cause  Taken 1/25/2025 1200 by Marcella Chavis RN  Safety Promotion/Fall Prevention:   activity supervised   increase visualization of patient   lighting adjusted   nonskid shoes/slippers when out of bed   room near nurse's station   room  organization consistent   safety round/check completed   supervised activity   clutter free environment maintained   treat reversible contributory factors   treat underlying cause  Taken 1/25/2025 0800 by Marcella Chavis RN  Safety Promotion/Fall Prevention:   activity supervised   increase visualization of patient   lighting adjusted   nonskid shoes/slippers when out of bed   room near nurse's station   room organization consistent   safety round/check completed   supervised activity   clutter free environment maintained   treat reversible contributory factors   treat underlying cause     Problem: Pain Acute  Goal: Optimal Pain Control and Function  Outcome: Progressing  Intervention: Optimize Psychosocial Wellbeing  Recent Flowsheet Documentation  Taken 1/25/2025 1600 by Marcella Chavis RN  Supportive Measures:   goal-setting facilitated   decision-making supported  Taken 1/25/2025 1200 by Marcella Chavis RN  Supportive Measures:   goal-setting facilitated   decision-making supported  Taken 1/25/2025 0800 by Marcella Chavis RN  Supportive Measures:   goal-setting facilitated   decision-making supported  Intervention: Develop Pain Management Plan  Recent Flowsheet Documentation  Taken 1/25/2025 1800 by Marcella Chavis RN  Pain Management Interventions:   rest   repositioned  Taken 1/25/2025 1600 by Marcella Chavis RN  Pain Management Interventions:   rest   repositioned  Taken 1/25/2025 1400 by Marcella Chavis RN  Pain Management Interventions:   rest   repositioned  Taken 1/25/2025 1200 by Marcella Chavis RN  Pain Management Interventions:   rest   repositioned  Taken 1/25/2025 1000 by Marcella Chavis RN  Pain Management Interventions:   repositioned   rest  Taken 1/25/2025 0805 by Marcella Chavis RN  Pain Management Interventions:   medication (see MAR)   rest   repositioned  Intervention: Prevent or Manage Pain  Recent Flowsheet Documentation  Taken 1/25/2025 1600 by Marcella Chavis  SHANTA Ramos  Sensory Stimulation Regulation:   auditory stimulation minimized   care clustered   lighting decreased  Medication Review/Management: medications reviewed  Taken 1/25/2025 1200 by Marcella Chavis RN  Sensory Stimulation Regulation:   auditory stimulation minimized   care clustered   lighting decreased  Medication Review/Management: medications reviewed  Taken 1/25/2025 0800 by Marcella Chavis RN  Sensory Stimulation Regulation:   auditory stimulation minimized   care clustered   lighting decreased  Medication Review/Management: medications reviewed     Problem: Gas Exchange Impaired  Goal: Optimal Gas Exchange  Outcome: Progressing  Intervention: Optimize Oxygenation and Ventilation  Recent Flowsheet Documentation  Taken 1/25/2025 1800 by Marcella Chavis RN  Head of Bed (HOB) Positioning: HOB at 60-90 degrees  Taken 1/25/2025 1600 by Marcella Chavis RN  Head of Bed (HOB) Positioning: HOB at 30 degrees  Taken 1/25/2025 1400 by Marcella Chavis RN  Head of Bed (HOB) Positioning: HOB at 30 degrees  Taken 1/25/2025 1200 by Marcella Chavis RN  Head of Bed (HOB) Positioning: HOB at 30 degrees   Goal Outcome Evaluation:                 Outcome Evaluation: pt extubated at 1800 to HFNC 40L 45%, calm cooperative, disoriented to situation/time. restless.  precedex continues at 0.7mcg/kg/min. BP/HR stable. dialysis run today- tolerated well. insulin gtt continues. tolerating tube feeds. no BM this shift. daughter at bedside and updated with plan of care by nursing and MD.

## 2025-01-26 NOTE — PLAN OF CARE
Problem: Adult Inpatient Plan of Care  Goal: Plan of Care Review  Description: The Plan of Care Review/Shift note should be completed every shift.  The Outcome Evaluation is a brief statement about your assessment that the patient is improving, declining, or no change.  This information will be displayed automatically on your shift  note.  Outcome: Progressing  Flowsheets (Taken 1/26/2025 1419)  Outcome Evaluation: less restlessness today. weaning precedex. follows commands, able to make needs known. drowsy.  disoriented to place and situation. BP/HR stable, continues in controlled afib. up to combilizer chair today from  [id number]- tolerated well and requested to be up in chair. changed to HFNC from Bipap, tolerating well at low settings. no BM this shift. tolerating tube feedings. daughter at bedside and updated with plan of care by nursing and MD.     Problem: Adult Inpatient Plan of Care  Goal: Optimal Comfort and Wellbeing  Outcome: Progressing  Intervention: Monitor Pain and Promote Comfort  Recent Flowsheet Documentation  Taken 1/26/2025 1400 by Marcella Chavis RN  Pain Management Interventions:   rest   repositioned  Taken 1/26/2025 1200 by Marcella Chavis RN  Pain Management Interventions:   rest   repositioned  Taken 1/26/2025 1000 by Marcella Chavis RN  Pain Management Interventions:   rest   repositioned  Taken 1/26/2025 0800 by Marcella Chavis RN  Pain Management Interventions:   rest   repositioned  Intervention: Provide Person-Centered Care  Recent Flowsheet Documentation  Taken 1/26/2025 1200 by Marcella Chavis RN  Trust Relationship/Rapport:   care explained   choices provided   emotional support provided   empathic listening provided   questions answered   questions encouraged   reassurance provided   thoughts/feelings acknowledged  Taken 1/26/2025 0800 by Marcella Chavis RN  Trust Relationship/Rapport:   care explained   choices provided   emotional support provided   empathic  listening provided   questions answered   questions encouraged   reassurance provided   thoughts/feelings acknowledged     Problem: Pain Acute  Goal: Optimal Pain Control and Function  Outcome: Progressing  Intervention: Optimize Psychosocial Wellbeing  Recent Flowsheet Documentation  Taken 1/26/2025 1200 by Marcella Chavis RN  Supportive Measures:   goal-setting facilitated   decision-making supported  Taken 1/26/2025 0800 by Marcella Chavis RN  Supportive Measures:   goal-setting facilitated   decision-making supported  Intervention: Develop Pain Management Plan  Recent Flowsheet Documentation  Taken 1/26/2025 1400 by Marcella Chavis RN  Pain Management Interventions:   rest   repositioned  Taken 1/26/2025 1200 by Marcella Chavis RN  Pain Management Interventions:   rest   repositioned  Taken 1/26/2025 1000 by Marclela Chavis RN  Pain Management Interventions:   rest   repositioned  Taken 1/26/2025 0800 by Marcella Chavis RN  Pain Management Interventions:   rest   repositioned  Intervention: Prevent or Manage Pain  Recent Flowsheet Documentation  Taken 1/26/2025 1200 by Marcella Chavis RN  Sensory Stimulation Regulation:   auditory stimulation minimized   care clustered   lighting decreased  Medication Review/Management: medications reviewed  Taken 1/26/2025 0800 by Marcella Chavis RN  Sensory Stimulation Regulation:   auditory stimulation minimized   care clustered   lighting decreased  Medication Review/Management: medications reviewed     Problem: Gas Exchange Impaired  Goal: Optimal Gas Exchange  Outcome: Progressing  Intervention: Optimize Oxygenation and Ventilation  Recent Flowsheet Documentation  Taken 1/26/2025 1400 by Marcella Chavis RN  Head of Bed (HOB) Positioning: HOB at 30 degrees  Taken 1/26/2025 1200 by Marcella Chavis RN  Airway/Ventilation Management:   airway patency maintained   pulmonary hygiene promoted  Taken 1/26/2025 0800 by Marcella Chavis  RN  Airway/Ventilation Management:   airway patency maintained   pulmonary hygiene promoted   Goal Outcome Evaluation:                 Outcome Evaluation: less restlessness today. weaning precedex. follows commands, able to make needs known. drowsy.  disoriented to place and situation. BP/HR stable, continues in controlled afib. up to combilizer chair today from  [id number]- tolerated well and requested to be up in chair. changed to HFNC from Bipap, tolerating well at low settings. no BM this shift. tolerating tube feedings. daughter at bedside and updated with plan of care by nursing and MD.

## 2025-01-26 NOTE — PROGRESS NOTES
Renal Medicine       Following for ESRD management/dialysis dependence  TTS outpatient schedule     Recent runs:    01.20.25  UF = 3 liter  01.21.25  UF = 1 liter limited hypotension  01.23.25  UF =  2.5 liter   01.25.25  UF =  2 liter     Extubated   BiPAP dependent   Plan dialysis 01.27.25  for additional UF         Recent Labs   Lab 01/25/25  0412 01/24/25  0413   POTASSIUM 5.1 4.0           BARBI Hawley    [facility]  [phone]

## 2025-01-26 NOTE — PROGRESS NOTES
ICU Progress Note   Date of Service: 01/26/25    Assessment and Plan:  75 year old female with PMHx significant for ESRD on HD, CHF, COPD, poor Mobility (L AKA, Obesity, WC bound), DM type II, hypertension.  She presented to the ER on 1/8/25 complaining of shortness of breath and was diagnosed with Influenza A. On 1/9/25 she was transferred to the ICU and intubated due to worsening acute on chronic respiratory failure. On the evening of 1/18/25 she was extubated but she was reintubated on 1/20.      Overnight events:   + cuff leak yesterday, passed SBT and was extubated to BIPAP     Neuro  Likely baseline cognitive impairment  Anxiety  - Precedex for sedation while on BIPAP  - PTA Zoloft, gabapentin     Pulmonary  Acute hypoxemic respiratory failure  Influenza A  COPD exacerbation  Hx of JONE  ? Hospital acquired pneumonia  Upper airway swelling/ pharyngeal edema  - Extubated 1/25  - Continue BIPAP at night and during the day as needed.  - Can switch to HFNC during the day as tolerated  - Continue high dose sterdoids with dexa 10 mg q6x 48 hours  - Racemic epi q4 x 24 hours  - Continue nebs  - off pulmicort nebs while on high dose steroids  - HAP antibiotics as per ID section     Cardiac  Shock, likely due to sedation (improved)  Afib now rate controlled  - MAP goal > 65, currently off norepi  - Transition amio gtt to PO  - IV heparin gtt, transition to DOAC when more stable     Renal  ESRD on HD  - Nephrology consult, appreciate recs  - iHD as per schedule  - Appears to be closer to euvolemia      GI  No active issues  - RD to manage TFs     Heme/Onc  Chronic anemia  Hx of Afib  - monitor CBC  - Transfuse for Hgb < 7  - Heparin gtt     ID  Influenza A pneumona  ? Hospital acquired pneumonia  - Tamiflu treatment completed  - Cefepime/Vanc restarted 1/20 after bronch  - BAL cultures with actinomyces, CT chest with RML infiltrate vs atelectasis but no sign of actinomycosis, this is likely colonization.  - given upper  "airway swelling and hx of PCN allergy, switched from cefepime to levofloxacin, last dose today     Endo  Hx of DM  - monitor BG  - insulin gtt         PPX  1. DVT: heparin gtt   2. VAP: HOB 30 degrees, chlorhexidine rinse  3. Stress Ulcer: PPI  4. Restraints: Nonviolent soft two point restraints required and necessary for patient safety and continued cares and good effect as patient continues to pull at necessary lines, tubes despite education and distraction. Will readdress daily.   5. Wound care - per unit routine   6. Feeding - TF  7. Family (daughter) updated at the bedside     Dispo: ICU      /49   Pulse 93   Temp 97.4  F (36.3  C) (Axillary)   Resp 24   Ht 1.702 m (5' 7\")   Wt 102 kg (224 lb 13.9 oz)   LMP  (LMP Unknown)   SpO2 99%   BMI 35.22 kg/m      FiO2 (%): 40 %, Resp: 24, Vent Mode: (S) CPAP/PS, Resp Rate (Set): 12 breaths/min, Tidal Volume (Set, mL): 400 mL, PEEP (cm H2O): 8 cmH2O, Pressure Support (cm H2O): 5 cmH2O, Resp Rate (Set): 12 breaths/min, Tidal Volume (Set, mL): 400 mL, PEEP (cm H2O): 8 cmH2O      Intake/Output Summary (Last 24 hours) at 1/26/2025 1456  Last data filed at 1/26/2025 1400  Gross per 24 hour   Intake 2687.14 ml   Output 2000 ml   Net 687.14 ml       Physical Exam:  Gen: Laying in bed in NAD  HEENT: NC AT  Resp: On BIPAP, B/L air entry, no wheezing or rhonchi  CVS: S1 S2, regular rhythm on tele  Abd: Soft NT ND  Ext: no swelling noted, s/p left BKA  Neuro: awake and alert, following commands    Labs: reviewed    Imaging: reviewed    Past Medical/Surgical history     Family history     Social history     Time Spent on this Encounter   Supriya was seen and evaluated by me on 01/26/25.  She was in critical condition as the result of acute hypoxic respiratory failure.    Her condition is now Serious.      The acute issues managed by me today include acute hypoxic resp failure, upper airway/pharyngeal swelling, ESRD, HAP, and afib   Supportive interventions provided " and/or ordered by me include noninvasive ventilation, iv antibiotics, amiodarone, anticoagulation and steroids    Total Critical Care time spent by me, excluding procedures, was 50 minutes.    Cheo Watt MD

## 2025-01-26 NOTE — PLAN OF CARE
[facility] Intensive Care Unit   Nursing Note                                                       Neuro:  PERRL.  Moves all extremities.  Follows commands.    Cardiovascular:  IRRR.  Hemodynamically stable off pressors.  Pulmonary:  Tolerating BiPAP with Precedex. Weak cough. BiPAP dependent.  Oxygen saturation > 92  GI/:  Anuric.  Endocrine: Glucose well controlled.  Skin:  Intact except incisional areas.  Protective mepilex applied to sacrum.  Restraints:  In use and necessary.  AM labs noted; electrolytes replaced as indicated.  Family updated  Continue to monitor closely.    Recent Labs   Lab 01/20/25  1541 01/20/25  0830   O2PER 50 5         ROUTINE IP LABS (Last four results)  BMP  Recent Labs   Lab 01/26/25  0618 01/26/25  0504 01/26/25  0410 01/26/25  0311 01/25/25  0621 01/25/25  0412 01/24/25  0554 01/24/25  0413 01/23/25  0743 01/23/25  0539 01/22/25  0819 01/22/25  0556   NA  --   --   --   --   --  136  --  136  --  134*  --  135   POTASSIUM  --   --   --   --   --  5.1  --  4.0  --  3.9  --  3.7   CHLORIDE  --   --   --   --   --  98  --  97*  --  94*  --  95*   JODY  --   --   --   --   --  10.3  --  9.8  --  10.1  --  10.1   CO2  --   --   --   --   --  24  --  26  --  25  --  26   BUN  --   --   --   --   --  64.5*  --  41.3*  --  74.5*  --  48.6*   CR  --   --   --   --   --  3.60*  --  2.66*  --  4.19*  --  3.29*   * 134* 129* 154*   < > 124*   < > 122*   < > 138*   < > 314*    < > = values in this interval not displayed.     CBC  Recent Labs   Lab 01/26/25  0409 01/25/25  0412 01/24/25  0413 01/23/25  0539   WBC 4.3 3.9* 6.2 6.3   RBC 2.60* 2.48* 2.39* 2.48*   HGB 8.2* 7.9* 7.7* 8.0*   HCT 25.9* 25.0* 24.2* 24.9*    101* 101* 100   MCH 31.5 31.9 32.2 32.3   MCHC 31.7 31.6 31.8 32.1   RDW 18.0* 18.0* 18.3* 18.2*    167 171 193         Intake/Output Summary (Last 24 hours) at 1/26/2025 0631  Last data filed at 1/26/2025 0600  Gross per 24 hour   Intake 2554.84 ml    Output 2000 ml   Net 554.84 ml       Santi Matos MSN RN PHN CCRN  [facility]  Intensive Care Unit            Goal Outcome Evaluation:      Plan of Care Reviewed With: patient    Overall Patient Progress: improvingOverall Patient Progress: improving

## 2025-01-26 NOTE — PROGRESS NOTES
Patient on BiPAP S/T overnight and throughout the day until 1400 when she was placed on heated high-flow nasal cannula 40 LPM 40% FiO2. Currently tolerating well with SpO2 in the high 90's. Duoneb, Pulmicort, racEPINEPHrine nebs given as ordered. Patient will most likely go back on BIPAP to rest overnight. Will continue to monitor.    Niraj Nava, RT on 1/26/2025 at 5:47 PM

## 2025-01-27 ENCOUNTER — APPOINTMENT (OUTPATIENT)
Dept: PHYSICAL THERAPY | Facility: CLINIC | Age: 75
DRG: 207 | End: 2025-01-27
Attending: HOSPITALIST
Payer: COMMERCIAL

## 2025-01-27 ENCOUNTER — APPOINTMENT (OUTPATIENT)
Dept: SPEECH THERAPY | Facility: CLINIC | Age: 75
DRG: 207 | End: 2025-01-27
Attending: HOSPITALIST
Payer: COMMERCIAL

## 2025-01-27 LAB
ANION GAP SERPL CALCULATED.3IONS-SCNC: 15 MMOL/L (ref 7–15)
BASE EXCESS BLDV CALC-SCNC: 0.9 MMOL/L (ref -3–3)
BASE EXCESS BLDV CALC-SCNC: 6.8 MMOL/L (ref -3–3)
BASE EXCESS BLDV CALC-SCNC: 7.2 MMOL/L (ref -3–3)
BUN SERPL-MCNC: 77.7 MG/DL (ref 8–23)
CALCIUM SERPL-MCNC: 11.2 MG/DL (ref 8.8–10.4)
CHLORIDE SERPL-SCNC: 98 MMOL/L (ref 98–107)
CREAT SERPL-MCNC: 3.29 MG/DL (ref 0.51–0.95)
EGFRCR SERPLBLD CKD-EPI 2021: 14 ML/MIN/1.73M2
ERYTHROCYTE [DISTWIDTH] IN BLOOD BY AUTOMATED COUNT: 18.2 % (ref 10–15)
GLUCOSE BLDC GLUCOMTR-MCNC: 103 MG/DL (ref 70–99)
GLUCOSE BLDC GLUCOMTR-MCNC: 109 MG/DL (ref 70–99)
GLUCOSE BLDC GLUCOMTR-MCNC: 111 MG/DL (ref 70–99)
GLUCOSE BLDC GLUCOMTR-MCNC: 112 MG/DL (ref 70–99)
GLUCOSE BLDC GLUCOMTR-MCNC: 119 MG/DL (ref 70–99)
GLUCOSE BLDC GLUCOMTR-MCNC: 121 MG/DL (ref 70–99)
GLUCOSE BLDC GLUCOMTR-MCNC: 122 MG/DL (ref 70–99)
GLUCOSE BLDC GLUCOMTR-MCNC: 122 MG/DL (ref 70–99)
GLUCOSE BLDC GLUCOMTR-MCNC: 125 MG/DL (ref 70–99)
GLUCOSE BLDC GLUCOMTR-MCNC: 128 MG/DL (ref 70–99)
GLUCOSE BLDC GLUCOMTR-MCNC: 152 MG/DL (ref 70–99)
GLUCOSE BLDC GLUCOMTR-MCNC: 158 MG/DL (ref 70–99)
GLUCOSE BLDC GLUCOMTR-MCNC: 180 MG/DL (ref 70–99)
GLUCOSE BLDC GLUCOMTR-MCNC: 192 MG/DL (ref 70–99)
GLUCOSE BLDC GLUCOMTR-MCNC: 214 MG/DL (ref 70–99)
GLUCOSE BLDC GLUCOMTR-MCNC: 81 MG/DL (ref 70–99)
GLUCOSE SERPL-MCNC: 105 MG/DL (ref 70–99)
HCO3 BLDV-SCNC: 28 MMOL/L (ref 21–28)
HCO3 BLDV-SCNC: 32 MMOL/L (ref 21–28)
HCO3 BLDV-SCNC: 32 MMOL/L (ref 21–28)
HCO3 SERPL-SCNC: 26 MMOL/L (ref 22–29)
HCT VFR BLD AUTO: 29.2 % (ref 35–47)
HGB BLD-MCNC: 8.9 G/DL (ref 11.7–15.7)
MAGNESIUM SERPL-MCNC: 2.1 MG/DL (ref 1.7–2.3)
MCH RBC QN AUTO: 31.3 PG (ref 26.5–33)
MCHC RBC AUTO-ENTMCNC: 30.5 G/DL (ref 31.5–36.5)
MCV RBC AUTO: 103 FL (ref 78–100)
O2/TOTAL GAS SETTING VFR VENT: 28 %
O2/TOTAL GAS SETTING VFR VENT: 30 %
O2/TOTAL GAS SETTING VFR VENT: 40 %
OXYHGB MFR BLDV: 77 % (ref 70–75)
OXYHGB MFR BLDV: 79 % (ref 70–75)
OXYHGB MFR BLDV: 79 % (ref 70–75)
PCO2 BLDV: 45 MM HG (ref 40–50)
PCO2 BLDV: 46 MM HG (ref 40–50)
PCO2 BLDV: 55 MM HG (ref 40–50)
PH BLDV: 7.31 [PH] (ref 7.32–7.43)
PH BLDV: 7.45 [PH] (ref 7.32–7.43)
PH BLDV: 7.46 [PH] (ref 7.32–7.43)
PLATELET # BLD AUTO: 306 10E3/UL (ref 150–450)
PO2 BLDV: 45 MM HG (ref 25–47)
PO2 BLDV: 45 MM HG (ref 25–47)
PO2 BLDV: 50 MM HG (ref 25–47)
POTASSIUM SERPL-SCNC: 4.7 MMOL/L (ref 3.4–5.3)
RBC # BLD AUTO: 2.84 10E6/UL (ref 3.8–5.2)
SAO2 % BLDV: 78.8 % (ref 70–75)
SAO2 % BLDV: 80.4 % (ref 70–75)
SAO2 % BLDV: 80.5 % (ref 70–75)
SODIUM SERPL-SCNC: 139 MMOL/L (ref 135–145)
UFH PPP CHRO-ACNC: 0.31 IU/ML
WBC # BLD AUTO: 10.5 10E3/UL (ref 4–11)

## 2025-01-27 PROCEDURE — 99291 CRITICAL CARE FIRST HOUR: CPT | Mod: GC | Performed by: INTERNAL MEDICINE

## 2025-01-27 PROCEDURE — 250N000011 HC RX IP 250 OP 636: Performed by: INTERNAL MEDICINE

## 2025-01-27 PROCEDURE — 250N000013 HC RX MED GY IP 250 OP 250 PS 637: Performed by: STUDENT IN AN ORGANIZED HEALTH CARE EDUCATION/TRAINING PROGRAM

## 2025-01-27 PROCEDURE — 97110 THERAPEUTIC EXERCISES: CPT | Mod: GP

## 2025-01-27 PROCEDURE — 250N000013 HC RX MED GY IP 250 OP 250 PS 637: Performed by: INTERNAL MEDICINE

## 2025-01-27 PROCEDURE — 634N000001 HC RX 634: Performed by: INTERNAL MEDICINE

## 2025-01-27 PROCEDURE — 82805 BLOOD GASES W/O2 SATURATION: CPT | Performed by: STUDENT IN AN ORGANIZED HEALTH CARE EDUCATION/TRAINING PROGRAM

## 2025-01-27 PROCEDURE — 99232 SBSQ HOSP IP/OBS MODERATE 35: CPT | Performed by: INTERNAL MEDICINE

## 2025-01-27 PROCEDURE — 250N000009 HC RX 250: Performed by: INTERNAL MEDICINE

## 2025-01-27 PROCEDURE — 90935 HEMODIALYSIS ONE EVALUATION: CPT

## 2025-01-27 PROCEDURE — 92610 EVALUATE SWALLOWING FUNCTION: CPT | Mod: GN

## 2025-01-27 PROCEDURE — 999N000157 HC STATISTIC RCP TIME EA 10 MIN

## 2025-01-27 PROCEDURE — 258N000003 HC RX IP 258 OP 636: Performed by: INTERNAL MEDICINE

## 2025-01-27 PROCEDURE — 258N000003 HC RX IP 258 OP 636: Performed by: STUDENT IN AN ORGANIZED HEALTH CARE EDUCATION/TRAINING PROGRAM

## 2025-01-27 PROCEDURE — 94640 AIRWAY INHALATION TREATMENT: CPT

## 2025-01-27 PROCEDURE — 94640 AIRWAY INHALATION TREATMENT: CPT | Mod: 76

## 2025-01-27 PROCEDURE — 82805 BLOOD GASES W/O2 SATURATION: CPT | Performed by: INTERNAL MEDICINE

## 2025-01-27 PROCEDURE — 82805 BLOOD GASES W/O2 SATURATION: CPT

## 2025-01-27 PROCEDURE — 85014 HEMATOCRIT: CPT | Performed by: STUDENT IN AN ORGANIZED HEALTH CARE EDUCATION/TRAINING PROGRAM

## 2025-01-27 PROCEDURE — 80048 BASIC METABOLIC PNL TOTAL CA: CPT | Performed by: INTERNAL MEDICINE

## 2025-01-27 PROCEDURE — P9045 ALBUMIN (HUMAN), 5%, 250 ML: HCPCS | Performed by: INTERNAL MEDICINE

## 2025-01-27 PROCEDURE — 94660 CPAP INITIATION&MGMT: CPT

## 2025-01-27 PROCEDURE — 83735 ASSAY OF MAGNESIUM: CPT | Performed by: INTERNAL MEDICINE

## 2025-01-27 PROCEDURE — 250N000009 HC RX 250

## 2025-01-27 PROCEDURE — 200N000001 HC R&B ICU

## 2025-01-27 PROCEDURE — 85520 HEPARIN ASSAY: CPT | Performed by: INTERNAL MEDICINE

## 2025-01-27 RX ORDER — QUETIAPINE FUMARATE 25 MG/1
25 TABLET, FILM COATED ORAL AT BEDTIME
Status: DISCONTINUED | OUTPATIENT
Start: 2025-01-27 | End: 2025-01-28

## 2025-01-27 RX ORDER — HEPARIN SODIUM 1000 [USP'U]/ML
500 INJECTION, SOLUTION INTRAVENOUS; SUBCUTANEOUS CONTINUOUS
Status: DISCONTINUED | OUTPATIENT
Start: 2025-01-27 | End: 2025-01-27

## 2025-01-27 RX ORDER — SODIUM CHLORIDE 9 MG/ML
INJECTION, SOLUTION INTRAVENOUS CONTINUOUS
Status: DISCONTINUED | OUTPATIENT
Start: 2025-01-27 | End: 2025-02-26

## 2025-01-27 RX ORDER — HEPARIN SODIUM 1000 [USP'U]/ML
500 INJECTION, SOLUTION INTRAVENOUS; SUBCUTANEOUS CONTINUOUS
Status: DISCONTINUED | OUTPATIENT
Start: 2025-01-27 | End: 2025-01-30

## 2025-01-27 RX ADMIN — Medication 50 MCG: at 08:32

## 2025-01-27 RX ADMIN — AMIODARONE HYDROCHLORIDE 200 MG: 200 TABLET ORAL at 08:32

## 2025-01-27 RX ADMIN — SODIUM CHLORIDE 500 ML: 9 INJECTION, SOLUTION INTRAVENOUS at 11:19

## 2025-01-27 RX ADMIN — QUETIAPINE 12.5 MG: 25 TABLET, FILM COATED ORAL at 09:48

## 2025-01-27 RX ADMIN — MICONAZOLE NITRATE: 2 POWDER TOPICAL at 08:33

## 2025-01-27 RX ADMIN — METOPROLOL TARTRATE 5 MG: 5 INJECTION INTRAVENOUS at 08:05

## 2025-01-27 RX ADMIN — INSULIN HUMAN 3 UNITS/HR: 1 INJECTION, SOLUTION INTRAVENOUS at 12:47

## 2025-01-27 RX ADMIN — SODIUM CHLORIDE: 9 INJECTION, SOLUTION INTRAVENOUS at 18:00

## 2025-01-27 RX ADMIN — METOPROLOL TARTRATE 5 MG: 5 INJECTION INTRAVENOUS at 03:03

## 2025-01-27 RX ADMIN — Medication 5 ML: at 08:45

## 2025-01-27 RX ADMIN — DEXMEDETOMIDINE HYDROCHLORIDE 0.8 MCG/KG/HR: 400 INJECTION INTRAVENOUS at 08:45

## 2025-01-27 RX ADMIN — SEVELAMER CARBONATE 800 MG: 800 TABLET, FILM COATED ORAL at 13:00

## 2025-01-27 RX ADMIN — RACEPINEPHRINE HYDROCHLORIDE 0.5 ML: 11.25 SOLUTION RESPIRATORY (INHALATION) at 00:10

## 2025-01-27 RX ADMIN — MICONAZOLE NITRATE: 2 POWDER TOPICAL at 20:40

## 2025-01-27 RX ADMIN — QUETIAPINE FUMARATE 25 MG: 25 TABLET ORAL at 21:18

## 2025-01-27 RX ADMIN — Medication 40 MG: at 08:32

## 2025-01-27 RX ADMIN — IPRATROPIUM BROMIDE AND ALBUTEROL SULFATE 3 ML: .5; 3 SOLUTION RESPIRATORY (INHALATION) at 07:13

## 2025-01-27 RX ADMIN — FENTANYL CITRATE 50 MCG: 50 INJECTION, SOLUTION INTRAMUSCULAR; INTRAVENOUS at 22:51

## 2025-01-27 RX ADMIN — IPRATROPIUM BROMIDE AND ALBUTEROL SULFATE 3 ML: .5; 3 SOLUTION RESPIRATORY (INHALATION) at 19:52

## 2025-01-27 RX ADMIN — ALBUMIN HUMAN 250 ML: 0.05 INJECTION, SOLUTION INTRAVENOUS at 11:19

## 2025-01-27 RX ADMIN — SERTRALINE HYDROCHLORIDE 75 MG: 50 TABLET ORAL at 21:18

## 2025-01-27 RX ADMIN — SEVELAMER CARBONATE 800 MG: 800 TABLET, FILM COATED ORAL at 18:05

## 2025-01-27 RX ADMIN — IPRATROPIUM BROMIDE AND ALBUTEROL SULFATE 3 ML: .5; 3 SOLUTION RESPIRATORY (INHALATION) at 11:22

## 2025-01-27 RX ADMIN — METOPROLOL TARTRATE 5 MG: 5 INJECTION INTRAVENOUS at 16:55

## 2025-01-27 RX ADMIN — RACEPINEPHRINE HYDROCHLORIDE 0.5 ML: 11.25 SOLUTION RESPIRATORY (INHALATION) at 07:13

## 2025-01-27 RX ADMIN — IPRATROPIUM BROMIDE AND ALBUTEROL SULFATE 3 ML: .5; 3 SOLUTION RESPIRATORY (INHALATION) at 15:04

## 2025-01-27 RX ADMIN — DEXMEDETOMIDINE HYDROCHLORIDE 1 MCG/KG/HR: 400 INJECTION INTRAVENOUS at 04:40

## 2025-01-27 RX ADMIN — METOPROLOL TARTRATE 5 MG: 5 INJECTION INTRAVENOUS at 22:52

## 2025-01-27 RX ADMIN — SENNOSIDES AND DOCUSATE SODIUM 1 TABLET: 50; 8.6 TABLET ORAL at 08:32

## 2025-01-27 RX ADMIN — ACETAMINOPHEN 650 MG: 325 TABLET, FILM COATED ORAL at 18:38

## 2025-01-27 RX ADMIN — RACEPINEPHRINE HYDROCHLORIDE 0.5 ML: 11.25 SOLUTION RESPIRATORY (INHALATION) at 04:11

## 2025-01-27 RX ADMIN — RACEPINEPHRINE HYDROCHLORIDE 0.5 ML: 11.25 SOLUTION RESPIRATORY (INHALATION) at 11:22

## 2025-01-27 RX ADMIN — Medication 1 MG: at 23:38

## 2025-01-27 RX ADMIN — DEXAMETHASONE SODIUM PHOSPHATE 10 MG: 10 INJECTION, SOLUTION INTRAMUSCULAR; INTRAVENOUS at 00:18

## 2025-01-27 RX ADMIN — ATORVASTATIN CALCIUM 20 MG: 20 TABLET, FILM COATED ORAL at 20:35

## 2025-01-27 RX ADMIN — APIXABAN 5 MG: 5 TABLET, FILM COATED ORAL at 20:36

## 2025-01-27 RX ADMIN — TACROLIMUS: 1 OINTMENT TOPICAL at 08:33

## 2025-01-27 RX ADMIN — APIXABAN 5 MG: 5 TABLET, FILM COATED ORAL at 13:00

## 2025-01-27 RX ADMIN — EPOETIN ALFA-EPBX 4000 UNITS: 10000 INJECTION, SOLUTION INTRAVENOUS; SUBCUTANEOUS at 11:18

## 2025-01-27 RX ADMIN — HEPARIN SODIUM 1350 UNITS/HR: 10000 INJECTION, SOLUTION INTRAVENOUS at 08:46

## 2025-01-27 RX ADMIN — CETIRIZINE HYDROCHLORIDE 5 MG: 5 TABLET ORAL at 21:18

## 2025-01-27 RX ADMIN — TACROLIMUS: 1 OINTMENT TOPICAL at 20:40

## 2025-01-27 RX ADMIN — SEVELAMER CARBONATE 800 MG: 800 TABLET, FILM COATED ORAL at 08:32

## 2025-01-27 ASSESSMENT — ACTIVITIES OF DAILY LIVING (ADL)
ADLS_ACUITY_SCORE: 78
ADLS_ACUITY_SCORE: 78
ADLS_ACUITY_SCORE: 84
ADLS_ACUITY_SCORE: 78
ADLS_ACUITY_SCORE: 88
ADLS_ACUITY_SCORE: 88
ADLS_ACUITY_SCORE: 84
ADLS_ACUITY_SCORE: 78
ADLS_ACUITY_SCORE: 78
ADLS_ACUITY_SCORE: 83
ADLS_ACUITY_SCORE: 78
ADLS_ACUITY_SCORE: 88
ADLS_ACUITY_SCORE: 78
ADLS_ACUITY_SCORE: 88
ADLS_ACUITY_SCORE: 84
ADLS_ACUITY_SCORE: 78
ADLS_ACUITY_SCORE: 78
ADLS_ACUITY_SCORE: 88

## 2025-01-27 NOTE — PROGRESS NOTES
ICU Progress Note   Date of Service: 01/27/25    Assessment and Plan:  75 year old female with PMHx significant for ESRD on HD, CHF, COPD, poor Mobility (L AKA, Obesity, WC bound), DM type II, hypertension.  She presented to the ER on 1/8/25 complaining of shortness of breath and was diagnosed with Influenza A. On 1/9/25 she was transferred to the ICU and intubated due to worsening acute on chronic respiratory failure. On the evening of 1/18/25 she was extubated but she was reintubated on 1/20.      Overnight events:   Remained on HFNC overnight     Neuro  Likely baseline cognitive impairment  Anxiety  - Precedex for sedation while on BIPAP, will attempt to wean today  - Start seroquel, 12.5 mg in AM, 25 mg qHS  - PTA Zoloft, gabapentin     Pulmonary  Acute hypoxemic respiratory failure  Influenza A  COPD exacerbation  Hx of JONE  ? Hospital acquired pneumonia  Upper airway swelling/ pharyngeal edema  - Extubated 1/25  - Place back on BiPAP this AM for a few hours, HFNC after as tolerated  - Completed high dose steroids with dexa 10 mg q6x 48 hours  - Continue nebs  - Restart pulmicort nebs after steroid burst  - HAP antibiotics as per ID section     Cardiac  Shock, likely due to sedation (improved)  Afib now rate controlled  - MAP goal > 65, currently off norepi  - Continue PO amio for RVR, PRN metoprolol  - IV heparin gtt, transition to DOAC when more stable     Renal  ESRD on HD  - Nephrology consult, appreciate recs  - iHD as per schedule     GI  No active issues  - RD to manage TFs     Heme/Onc  Chronic anemia  Hx of Afib  - monitor CBC  - Transfuse for Hgb < 7  - Heparin gtt, transition to DOAC     ID  Influenza A pneumona  ? Hospital acquired pneumonia  - Tamiflu treatment completed  - Cefepime/Vanc restarted 1/20 after bronch  - BAL cultures with actinomyces, CT chest with RML infiltrate vs atelectasis but no sign of actinomycosis, this is likely colonization.  - given upper airway swelling and hx of PCN  "allergy, switched from cefepime to levofloxacin, last dose 1/26  - Monitor off antibiotics     Endo  Hx of DM  - monitor BG  - insulin gtt, off this AM  - Will transition to sliding scale insulin if gtt remains off or low rate         PPX  1. DVT: heparin gtt   2. VAP: HOB 30 degrees, chlorhexidine rinse  3. Stress Ulcer: PPI  4. Restraints: Nonviolent soft two point restraints required and necessary for patient safety and continued cares and good effect as patient continues to pull at necessary lines, tubes despite education and distraction. Will readdress daily.   5. Wound care - per unit routine   6. Feeding - TF  7. Will update family     Dispo: ICU      /46   Pulse 94   Temp 98.4  F (36.9  C) (Oral)   Resp 29   Ht 1.702 m (5' 7\")   Wt 106.5 kg (234 lb 12.6 oz)   LMP  (LMP Unknown)   SpO2 99%   BMI 36.77 kg/m      FiO2 (%): 35 %, Resp: 29      Intake/Output Summary (Last 24 hours) at 1/27/2025 0851  Last data filed at 1/27/2025 0800  Gross per 24 hour   Intake 2555.36 ml   Output --   Net 2555.36 ml         Physical Exam:  Gen: Laying in bed in NAD  HEENT: NC AT  Resp: On HFNC, B/L air entry, no wheezing or rhonchi  CVS: Tachycardic, Afib on monitor  Abd: Soft NT ND  Ext: no swelling noted, s/p left BKA  Neuro: awake, slightly confused, but following commands    Labs: reviewed    Imaging: reviewed    Past Medical/Surgical history     Family history     Social history     Time spent on patient: 40 minutes. This does not include additional procedures    Discussed with staff, Dr. Sudhir Laughlin [place]  Surgical Critical Care Fellow  "

## 2025-01-27 NOTE — PROGRESS NOTES
Potassium   Date Value Ref Range Status   01/27/2025 4.7 3.4 - 5.3 mmol/L Final   02/02/2022 4.7 3.4 - 5.3 mmol/L Final   04/17/2021 4.2 3.4 - 5.3 mmol/L Final     Hemoglobin   Date Value Ref Range Status   01/27/2025 8.9 (L) 11.7 - 15.7 g/dL Final   04/16/2021 10.9 (L) 11.7 - 15.7 g/dL Final     Creatinine   Date Value Ref Range Status   01/27/2025 3.29 (H) 0.51 - 0.95 mg/dL Final   04/17/2021 5.98 (H) 0.52 - 1.04 mg/dL Final     Urea Nitrogen   Date Value Ref Range Status   01/27/2025 77.7 (H) 8.0 - 23.0 mg/dL Final   11/24/2021 37 (H) 7 - 30 mg/dL Final   04/17/2021 68 (H) 7 - 30 mg/dL Final     Sodium   Date Value Ref Range Status   01/27/2025 139 135 - 145 mmol/L Final   04/17/2021 137 133 - 144 mmol/L Final     INR   Date Value Ref Range Status   04/24/2024 1.11 0.85 - 1.15 Final   04/16/2021 1.14 0.86 - 1.14 Final       DIALYSIS PROCEDURE NOTE  Hepatitis status of previous patient on machine log was checked and verified ok to use with this patients hepatitis status.  Patient dialyzed for 3.5   hrs. on a K2 bath with a net fluid removal of  3.5L.  A BFR of 450. ml/min was obtained via a Left AV Fistula using 15 gauge needles.      The treatment plan was discussed with Dr. Lim during the treatment.    Total heparin received during the treatment: 0 units.   Needle cannulation sites held x 10 minutes venous and 12 minutes arterial.     Meds  given: Albumin 250 ml/12 G   Complications: none      Person educated: Patient . Knowledge base minimal. Barriers to learning: Confusion and on Bipap. Educated on Procedure via verbal mode. The patient could verbalized understanding.   ICEBOAT? Timeout performed pre-treatment  I: Patient was identified using 2 identifiers  C:  Consent Signed Yes  E: Equipment preventative maintenance is current and dialysis delivery system OK to use  B: Hepatitis B Surface Antigen:Negative  Draw Date: 01/08/25;      Hepatitis B Surface Antibody: Susceptible; Draw Date: 01/09/2025  O:  Dialysis orders present and complete prior to treatment  A: Vascular access verified and assessed prior to treatment  T: Treatment was performed at a clinically appropriate time  ?: Patient was allowed to ask questions and address concerns prior to treatment  See Adult Hemodialysis flowsheet in EPIC for further details and post assessment.  Machine water alarm in place and functioning. Transducer pods intact and checked every 15min.   Pt assisted with repositioning throughout dialysis treatment.    Chlorine/Chloramine water system checked every 4 hours.        Post treatment report given to IsrraelRN, RN regarding 3.5L of fluid removed, last BP.  Please remove patient dressing on AVF and AVG needle sites 24 hours after dialysis. If leaking occurs please apply a Band-Aid.

## 2025-01-27 NOTE — PROGRESS NOTES
Renal Medicine Progress Note            Assessment/Plan:     75 y.o woman with ESRD, admitted for respiratory distress due to influenza A infection.     # ESRD:   -TTS   -NICOLEF   -Dr. Chetna Goode [facility]     # Influenza A:   -finished Tamiflu    # FEN: trace edema at the thigh. Electrolytes are acceptable.     # Anemia: Hgb is below target but acceptable.    -epo with HD    Plan:  # 3.5 hrs HD. UF ~ 3-3.5 liters net as tolerated. Pt is on a heparin gtt.         Interval History:     Afebrile.  BP is okay.  Tachy.   I/O: + 17 liters since admission and weight is up from 96.8 to 106.5 kg?  She is on BIPAP.  She is awake and alert.   I reviewed HD order with dialysis RN (Vani) at the bedside.            Medications and Allergies:     Current Facility-Administered Medications   Medication Dose Route Frequency Provider Last Rate Last Admin    - MEDICATION INSTRUCTIONS for Dialysis Patients -   Does not apply See Admin Instructions Cheo Watt MD        albumin human 5 % injection 250 mL  250 mL Intravenous Once in dialysis/CRRT EVELIA Hawley MD        amiodarone (PACERONE) tablet 200 mg  200 mg Oral or FT or NG tube Daily Cheo Watt MD   200 mg at 01/27/25 0832    atorvastatin (LIPITOR) tablet 20 mg  20 mg Oral or Feeding Tube QPM Cheo Watt MD   20 mg at 01/26/25 2012    B and C vitamin Complex with folic acid (NEPHRONEX) liquid 5 mL  5 mL Per Feeding Tube Daily Cheo Watt MD   5 mL at 01/27/25 0845    budesonide (PULMICORT) neb solution 1 mg  1 mg Nebulization BID Truman Moreland MD        cetirizine (zyrTEC) tablet 5 mg  5 mg Oral or Feeding Tube At Bedtime Cheo Watt MD   5 mg at 01/26/25 2152    epoetin candy-epbx (RETACRIT) injection 4,000 Units  4,000 Units Intravenous Once in dialysis/CRRT EVELIA Hawley MD        ipratropium - albuterol 0.5 mg/2.5 mg/3 mL (DUONEB) neb solution 3 mL  3 mL Nebulization 4x daily Cheo Watt MD   3 mL at 01/27/25 0713    miconazole (MICATIN) 2 % powder  "  Topical BID Truman Moreland MD   Given at 01/27/25 0833    pantoprazole (PROTONIX) 2 mg/mL suspension 40 mg  40 mg Per Feeding Tube QAM AC Cheo Watt MD   40 mg at 01/27/25 0832    Or    pantoprazole (PROTONIX) IV push injection 40 mg  40 mg Intravenous QAM AC Cheo Watt MD        QUEtiapine (SEROquel) half-tab 12.5 mg  12.5 mg Oral BID Truman Moreland MD        QUEtiapine (SEROquel) half-tab 12.5 mg  12.5 mg Oral At Bedtime Truman Moreland MD        racEPINEPHrine neb solution 0.5 mL  0.5 mL Nebulization Q4H Cheo Watt MD   0.5 mL at 01/27/25 0713    sertraline (ZOLOFT) tablet 75 mg  75 mg Oral or Feeding Tube QPM Cheo Watt MD   75 mg at 01/26/25 2012    sevelamer carbonate (RENVELA) tablet 800 mg  800 mg Oral or Feeding Tube TID w/meals Cheo Watt MD   800 mg at 01/27/25 0832    sodium chloride 0.9% BOLUS 200 mL  200 mL Hemodialysis Machine Once EVELIA Hawley MD        sodium chloride 0.9% BOLUS 500 mL  500 mL Hemodialysis Machine Once EVELIA Hawley MD        tacrolimus (PROTOPIC) 0.1 % ointment   Topical BID Cheo Watt MD   Given at 01/27/25 0833    vitamin D3 (CHOLECALCIFEROL) tablet 50 mcg  50 mcg Oral or Feeding Tube Daily Cheo Watt MD   50 mcg at 01/27/25 0832        Allergies   Allergen Reactions    Ampicillin-Sulbactam Sodium Rash     No evidence SJS, but very uncomfortable and precipitated multiple provider visits. Would not use penicillins again if other options available.     Penicillins Rash            Physical Exam:   Vitals were reviewed   , Blood pressure 137/83, pulse (!) 112, temperature 98  F (36.7  C), temperature source Axillary, resp. rate (!) 34, height 1.702 m (5' 7\"), weight 106.5 kg (234 lb 12.6 oz), SpO2 94%, not currently breastfeeding.    Wt Readings from Last 3 Encounters:   01/27/25 106.5 kg (234 lb 12.6 oz)   06/14/24 101.1 kg (222 lb 14.2 oz)   06/03/24 101.1 kg (222 lb 14.2 oz)       Intake/Output Summary (Last 24 hours) at 1/27/2025 " 0944  Last data filed at 1/27/2025 0800  Gross per 24 hour   Intake 2569.04 ml   Output --   Net 2569.04 ml       GENERAL APPEARANCE: NAD  HEENT:  Eyes/ears/nose/neck grossly normal. On BIPAP  RESP: Poor airflow. No wheezes.  CV: irregular.   ABDOMEN: obese. soft  EXTREMITIES/SKIN: trace edema at the thigh. L BKA.   NEURO: Awake and alert.          Data:     CBC RESULTS:     Recent Labs   Lab 01/27/25  0804 01/26/25  0409 01/25/25  0412 01/24/25  0413 01/23/25  0539 01/22/25  0556   WBC 10.5 4.3 3.9* 6.2 6.3 7.5   RBC 2.84* 2.60* 2.48* 2.39* 2.48* 2.52*   HGB 8.9* 8.2* 7.9* 7.7* 8.0* 7.9*   HCT 29.2* 25.9* 25.0* 24.2* 24.9* 25.3*    178 167 171 193 199       Basic Metabolic Panel:  Recent Labs   Lab 01/27/25  0858 01/27/25  0752 01/27/25  0601 01/27/25  0421 01/27/25  0420 01/27/25  0203 01/26/25  1227 01/26/25  1135 01/25/25  0621 01/25/25  0412 01/24/25  0554 01/24/25  0413 01/23/25  0743 01/23/25  0539 01/22/25  0819 01/22/25  0556   NA  --   --   --  139  --   --   --  134*  --  136  --  136  --  134*  --  135   POTASSIUM  --   --   --  4.7  --   --   --  4.5  --  5.1  --  4.0  --  3.9  --  3.7   CHLORIDE  --   --   --  98  --   --   --  95*  --  98  --  97*  --  94*  --  95*   CO2  --   --   --  26  --   --   --  26  --  24  --  26  --  25  --  26   BUN  --   --   --  77.7*  --   --   --  54.7*  --  64.5*  --  41.3*  --  74.5*  --  48.6*   CR  --   --   --  3.29*  --   --   --  2.55*  --  3.60*  --  2.66*  --  4.19*  --  3.29*   * 180* 81 105* 103* 122*   < > 167*   < > 124*   < > 122*   < > 138*   < > 314*   JODY  --   --   --  11.2*  --   --   --  10.5*  --  10.3  --  9.8  --  10.1  --  10.1    < > = values in this interval not displayed.       INRNo lab results found in last 7 days.   Attestation:   I have reviewed today's relevant vital signs, notes, medications, labs and imaging.    Tian Lim MD  [facility] - Nephrology  Office phone :[phone]  Pager: [phone]

## 2025-01-27 NOTE — PROGRESS NOTES
"Clinical Swallow Evaluation (CSE):     01/27/25 3385   Appointment Info   Signing Clinician's Name / Credentials (SLP) Alexandria Bowling MS CCC-SLP   General Information   Onset of Illness/Injury or Date of Surgery 01/08/25   Referring Physician Ann Mitchell MD   Patient/Family Therapy Goal Statement (SLP) To eat/drink   Pertinent History of Current Problem   Per provider \"75 year old female with PMHx significant for ESRD on HD, CHF, COPD, poor Mobility (L AKA, Obesity, WC bound), DM type II, hypertension.  She presented to the ER on 1/8/25 complaining of shortness of breath and was diagnosed with Influenza A. On 1/9/25 she was transferred to the ICU and intubated due to worsening acute on chronic respiratory failure. On the evening of 1/18/25 she was extubated but she was reintubated on 1/20.\" Extubated 1/25. SLP for post-extubation swallow eval.     General Observations   Pt alert and upright in chair following PT eval. Pt with some confusion but able to attend to tasks, anwer questions, follow directions. Occasional lingual pumping. Voice is mildly hypophonic, clear and strong vocal quality. Weaned to 3 L NC today.     Type of Evaluation   Type of Evaluation Swallow Evaluation   Oral Motor   Oral Musculature generally intact   Structural Abnormalities none present   Mucosal Quality good   Dentition (Oral Motor)   Dentition (Oral Motor)   (upper dentures present but not placed)   Facial Symmetry (Oral Motor)   Facial Symmetry (Oral Motor) WNL   Lip Function (Oral Motor)   Lip Range of Motion (Oral Motor) WNL   Lip Strength (Oral Motor) WFL   Tongue Function (Oral Motor)   Tongue Strength (Oral Motor) WNL   Tongue ROM (Oral Motor) WNL   Jaw Function (Oral Motor)   Jaw Function (Oral Motor) WNL   Cough/Swallow/Gag Reflex (Oral Motor)   Soft Palate/Velum (Oral Motor) WNL   Volitional Throat Clear/Cough (Oral Motor) WNL   Volitional Swallow (Oral Motor) WNL   Vocal Quality/Secretion Management (Oral Motor)   Vocal " Quality (Oral Motor) hypophonic   Secretion Management (Oral Motor) WNL   General Swallowing Observations   Past History of Dysphagia None per EMR prior to admission; Prior CSE this admission 1/19 after first extubation.   Respiratory Support nasal cannula  (3L)   Current Diet/Method of Nutritional Intake (General Swallowing Observations, NIS) NPO;nasogastric tube (NG)   Swallowing Evaluation Clinical swallow evaluation   Clinical Swallow Evaluation   Feeding Assistance   (hand over hand assist beneficial)   Clinical Swallow Evaluation Textures Trialed thin liquids;mildly thick liquids;pureed   Clinical Swallow Eval: Thin Liquid Texture Trial   Mode of Presentation, Thin Liquids spoon;cup   Volume of Liquid or Food Presented ice chips x5, cup sips x6   Oral Phase of Swallow premature pharyngeal entry  (occassional mild oral holding)   Pharyngeal Phase of Swallow intact   Diagnostic Statement some mild oral holding, no overt clinical signs/sx aspiration noted, clear vocal quality, ?mild increased WOB   Clinical Swallow Eval: Mildly Thick Liquids   Mode of Presentation spoon;cup   Volume Presented x10 sips   Oral Phase WFL   Pharyngeal Phase intact   Diagnostic Statement improved oral timing, no overt clinical signs/sx aspiration noted   Clinical Swallow Evaluation: Puree Solid Texture Trial   Mode of Presentation, Puree spoon;fed by clinician   Volume of Puree Presented x5 bites   Oral Phase, Puree WFL   Pharyngeal Phase, Puree intact   Diagnostic Statement good oral timing and oral clearance, no overt clinical signs/sx aspiration noted   Swallowing Recommendations   Diet Consistency Recommendations full liquid diet;mildly thick liquids (level 2)   Medication Administration Recommendations, Swallowing (SLP) non oral for now, until VFSS 1/28   Instrumental Assessment Recommendations VFSS (videofluoroscopic swallowing study)   Comment, Swallowing Recommendations VFSS indicated given x2, prolonged intubations this  admission; unable to fully assess pharyngeal swallow or potential silent aspiration risk   General Therapy Interventions   Planned Therapy Interventions Dysphagia Treatment   Clinical Impression   Criteria for Skilled Therapeutic Interventions Met (SLP Eval) Yes, treatment indicated   SLP Diagnosis mild oral and potential pharyngeal dysphagia   Risks & Benefits of therapy have been explained evaluation/treatment results reviewed;care plan/treatment goals reviewed;risks/benefits reviewed;current/potential barriers reviewed;participants voiced agreement with care plan;participants included;patient   Clinical Impression Comments   Clinical swallow evaluation completed with thin liquids, mildly thick liquids, puree solids. Pt currently presents with mild oral and potential pharyngeal dysphagia. Pt stable on 3 L NC this afternoon, voice is mildly hypophonic but has clear/strong quality. Some confusion but able to answer questions, be redirected and follow directions. Grossly WFL oromotor exam. WFL labial seal and oral clearance. Some mild oral holding with thin liquids; timely swallows with mildly thick + puree. Notable laryngeal elevation to palpation. No overt clincal signs/sx aspiration across trials (stable SP02, clear vocal quality, no cough/throat clear) but ?mild increased WOB. Educated pt on medical status, intubation(s) and recommendations for cautious PO until VFSS for further assessment. Pt in agreement. Updated RN.     SLP Total Evaluation Time   Eval: oral/pharyngeal swallow function, clinical swallow Minutes (79372) 20   SLP Goals   Therapy Frequency (SLP Eval) daily   SLP Predicted Duration/Target Date for Goal Attainment 02/10/25   SLP Goals Swallow   SLP: Safely tolerate diet without signs/symptoms of aspiration Regular diet;Thin liquids;With use of swallow precautions;With use of compensatory swallow strategies;With assistance/supervision   SLP Discharge Planning   SLP Plan VFSS   SLP Discharge  Recommendation Transitional Care Facility   SLP Rationale for DC Rec Pt below baseline re: swallow and cognitive communication function   SLP Brief overview of current status  Clinically presenting with mild dysphagia, however elevated risk for aspiration given prolonged intubation(s) this admission. Recommend cautious initiation of full liquids/mildly thick liquids with single bites/sips, alternate between solids/liquids; HOLD if any difficulty. Recommending VFSS 1/28 for further assessment   SLP Time and Intention   Total Session Time (sum of timed and untimed services) 20

## 2025-01-27 NOTE — PLAN OF CARE
"  Problem: Adult Inpatient Plan of Care  Goal: Plan of Care Review  Description: The Plan of Care Review/Shift note should be completed every shift.  The Outcome Evaluation is a brief statement about your assessment that the patient is improving, declining, or no change.  This information will be displayed automatically on your shift  note.  Outcome: Progressing  Flowsheets (Taken 1/26/2025 1844)  Plan of Care Reviewed With:   patient   child  Overall Patient Progress: no change  Goal: Patient-Specific Goal (Individualized)  Description: You can add care plan individualizations to a care plan. Examples of Individualization might be:  \"Parent requests to be called daily at 9am for status\", \"I have a hard time hearing out of my right ear\", or \"Do not touch me to wake me up as it startles  me\".  Outcome: Progressing  Goal: Absence of Hospital-Acquired Illness or Injury  Outcome: Progressing  Intervention: Identify and Manage Fall Risk  Recent Flowsheet Documentation  Taken 1/26/2025 1600 by Nicole Childress, RN  Safety Promotion/Fall Prevention:   activity supervised   clutter free environment maintained   increased rounding and observation   treat reversible contributory factors   safety round/check completed   room near nurse's station   patient and family education  Intervention: Prevent Skin Injury  Recent Flowsheet Documentation  Taken 1/26/2025 1619 by Nicole Childress, RN  Body Position:   turned   side-lying 30 degrees  Taken 1/26/2025 1600 by Nicole Childress, RN  Skin Protection:   adhesive use limited   silicone foam dressing in place   pulse oximeter probe site changed   incontinence pads utilized   tubing/devices free from skin contact  Intervention: Prevent and Manage VTE (Venous Thromboembolism) Risk  Recent Flowsheet Documentation  Taken 1/26/2025 1600 by Nicole Childress, RN  VTE Prevention/Management: SCDs on (sequential compression devices)  Goal: Optimal Comfort and Wellbeing  Outcome: " Progressing  Intervention: Provide Person-Centered Care  Recent Flowsheet Documentation  Taken 1/26/2025 1600 by Nicole Childress RN  Trust Relationship/Rapport:   care explained   choices provided   emotional support provided   reassurance provided   thoughts/feelings acknowledged  Goal: Readiness for Transition of Care  Outcome: Progressing   Goal Outcome Evaluation:      Plan of Care Reviewed With: patient, child    Overall Patient Progress: no changeOverall Patient Progress: no change    Intermittently confused. Anxious, tearful, restless at times, redirectable. Precedex @ 0.6. Tele Afib w/ CVR. HFNC 40%, 40 LPM. Heparin infusing @ 1350 units/hr. Insulin gtt @ 4units/hr, alg 4. Daughter updated at bedside.

## 2025-01-27 NOTE — PLAN OF CARE
[facility] Intensive Care Unit   Nursing Note                                                       Neuro:  PERRL.  Moves all extremities.  Follows commands.  Confused, delirious.   Cardiovascular:  IRRR.  A-fib with variable rate. Hemodynamically stable without pressors. Metoprolol given twice for rate control.  Pulmonary:  Tolerating HFNC.  Oxygen saturation > 92  GI/:  Anuric.  Endocrine: Glucose well controlled.  Skin:  Intact except incisional areas.  Protective mepilex applied to sacrum.  Restraints:  Not in use or necessary.  AM labs noted; electrolytes replaced as indicated.  Family updated  Continue to monitor closely.    Recent Labs   Lab 01/27/25  0421 01/20/25  1541 01/20/25  0830   O2PER 40 50 5         ROUTINE IP LABS (Last four results)  BMP  Recent Labs   Lab 01/27/25  0601 01/27/25  0421 01/27/25  0420 01/27/25  0203 01/26/25  1227 01/26/25  1135 01/25/25  0621 01/25/25  0412 01/24/25  0554 01/24/25  0413   NA  --  139  --   --   --  134*  --  136  --  136   POTASSIUM  --  4.7  --   --   --  4.5  --  5.1  --  4.0   CHLORIDE  --  98  --   --   --  95*  --  98  --  97*   JODY  --  11.2*  --   --   --  10.5*  --  10.3  --  9.8   CO2  --  26  --   --   --  26  --  24  --  26   BUN  --  77.7*  --   --   --  54.7*  --  64.5*  --  41.3*   CR  --  3.29*  --   --   --  2.55*  --  3.60*  --  2.66*   GLC 81 105* 103* 122*   < > 167*   < > 124*   < > 122*    < > = values in this interval not displayed.     CBC  Recent Labs   Lab 01/26/25  0409 01/25/25  0412 01/24/25  0413 01/23/25  0539   WBC 4.3 3.9* 6.2 6.3   RBC 2.60* 2.48* 2.39* 2.48*   HGB 8.2* 7.9* 7.7* 8.0*   HCT 25.9* 25.0* 24.2* 24.9*    101* 101* 100   MCH 31.5 31.9 32.2 32.3   MCHC 31.7 31.6 31.8 32.1   RDW 18.0* 18.0* 18.3* 18.2*    167 171 193           Intake/Output Summary (Last 24 hours) at 1/27/2025 0627  Last data filed at 1/27/2025 0600  Gross per 24 hour   Intake 2808.86 ml   Output --   Net 2808.86 ml       Santi  Carlo MSN RN PHN CCRN  [facility]  Intensive Care Unit              Problem: Risk for Delirium  Goal: Optimal Coping  Outcome: Not Progressing     Problem: Fall Injury Risk  Goal: Absence of Fall and Fall-Related Injury  Outcome: Progressing     Problem: Gas Exchange Impaired  Goal: Optimal Gas Exchange  Outcome: Progressing   Goal Outcome Evaluation:      Plan of Care Reviewed With: patient    Overall Patient Progress: improvingOverall Patient Progress: improving

## 2025-01-27 NOTE — PROGRESS NOTES
[facility] - Progress Note  [place]   ICU  Referral Source: Follow Up    Supriya was receiving dialysis when I visited; she seemed to struggle with speech but named her daughter would be here again tomorrow.    Plan: Spiritual care will follow.      Rev Magalie Lugo MA  Associate     [facility] routine referrals ([facility] *[phone]) ([facility] *[phone])  [facility] available 24/7 for emergent requests/referrals, either by paging the on-call  or by entering an ASAP/STAT consult in Epic (this will also page the on-call ).

## 2025-01-28 ENCOUNTER — APPOINTMENT (OUTPATIENT)
Dept: PHYSICAL THERAPY | Facility: CLINIC | Age: 75
End: 2025-01-28
Payer: COMMERCIAL

## 2025-01-28 LAB
ANION GAP SERPL CALCULATED.3IONS-SCNC: 13 MMOL/L (ref 7–15)
BASE EXCESS BLDV CALC-SCNC: 5.2 MMOL/L (ref -3–3)
BASE EXCESS BLDV CALC-SCNC: 7.5 MMOL/L (ref -3–3)
BUN SERPL-MCNC: 56.9 MG/DL (ref 8–23)
CALCIUM SERPL-MCNC: 10.6 MG/DL (ref 8.8–10.4)
CHLORIDE SERPL-SCNC: 97 MMOL/L (ref 98–107)
CREAT SERPL-MCNC: 2.54 MG/DL (ref 0.51–0.95)
EGFRCR SERPLBLD CKD-EPI 2021: 19 ML/MIN/1.73M2
GLUCOSE BLDC GLUCOMTR-MCNC: 108 MG/DL (ref 70–99)
GLUCOSE BLDC GLUCOMTR-MCNC: 118 MG/DL (ref 70–99)
GLUCOSE BLDC GLUCOMTR-MCNC: 122 MG/DL (ref 70–99)
GLUCOSE BLDC GLUCOMTR-MCNC: 124 MG/DL (ref 70–99)
GLUCOSE BLDC GLUCOMTR-MCNC: 124 MG/DL (ref 70–99)
GLUCOSE BLDC GLUCOMTR-MCNC: 125 MG/DL (ref 70–99)
GLUCOSE BLDC GLUCOMTR-MCNC: 132 MG/DL (ref 70–99)
GLUCOSE BLDC GLUCOMTR-MCNC: 134 MG/DL (ref 70–99)
GLUCOSE BLDC GLUCOMTR-MCNC: 136 MG/DL (ref 70–99)
GLUCOSE BLDC GLUCOMTR-MCNC: 162 MG/DL (ref 70–99)
GLUCOSE BLDC GLUCOMTR-MCNC: 80 MG/DL (ref 70–99)
GLUCOSE SERPL-MCNC: 139 MG/DL (ref 70–99)
HCO3 BLDV-SCNC: 31 MMOL/L (ref 21–28)
HCO3 BLDV-SCNC: 32 MMOL/L (ref 21–28)
HCO3 SERPL-SCNC: 28 MMOL/L (ref 22–29)
MAGNESIUM SERPL-MCNC: 2 MG/DL (ref 1.7–2.3)
O2/TOTAL GAS SETTING VFR VENT: 28 %
O2/TOTAL GAS SETTING VFR VENT: 35 %
OXYHGB MFR BLDV: 73 % (ref 70–75)
OXYHGB MFR BLDV: 74 % (ref 70–75)
PCO2 BLDV: 45 MM HG (ref 40–50)
PCO2 BLDV: 52 MM HG (ref 40–50)
PH BLDV: 7.38 [PH] (ref 7.32–7.43)
PH BLDV: 7.46 [PH] (ref 7.32–7.43)
PO2 BLDV: 42 MM HG (ref 25–47)
PO2 BLDV: 44 MM HG (ref 25–47)
POTASSIUM SERPL-SCNC: 4.4 MMOL/L (ref 3.4–5.3)
SAO2 % BLDV: 74.8 % (ref 70–75)
SAO2 % BLDV: 75.4 % (ref 70–75)
SODIUM SERPL-SCNC: 138 MMOL/L (ref 135–145)

## 2025-01-28 PROCEDURE — 80048 BASIC METABOLIC PNL TOTAL CA: CPT | Performed by: INTERNAL MEDICINE

## 2025-01-28 PROCEDURE — 90935 HEMODIALYSIS ONE EVALUATION: CPT

## 2025-01-28 PROCEDURE — 83735 ASSAY OF MAGNESIUM: CPT | Performed by: INTERNAL MEDICINE

## 2025-01-28 PROCEDURE — 999N000287 HC ICU ADULT ROUNDING, EACH 10 MINS

## 2025-01-28 PROCEDURE — 99232 SBSQ HOSP IP/OBS MODERATE 35: CPT | Performed by: INTERNAL MEDICINE

## 2025-01-28 PROCEDURE — 250N000009 HC RX 250: Performed by: STUDENT IN AN ORGANIZED HEALTH CARE EDUCATION/TRAINING PROGRAM

## 2025-01-28 PROCEDURE — 250N000009 HC RX 250: Performed by: INTERNAL MEDICINE

## 2025-01-28 PROCEDURE — 250N000011 HC RX IP 250 OP 636: Performed by: INTERNAL MEDICINE

## 2025-01-28 PROCEDURE — 250N000013 HC RX MED GY IP 250 OP 250 PS 637: Performed by: INTERNAL MEDICINE

## 2025-01-28 PROCEDURE — 258N000003 HC RX IP 258 OP 636: Performed by: STUDENT IN AN ORGANIZED HEALTH CARE EDUCATION/TRAINING PROGRAM

## 2025-01-28 PROCEDURE — 200N000001 HC R&B ICU

## 2025-01-28 PROCEDURE — 82435 ASSAY OF BLOOD CHLORIDE: CPT | Performed by: INTERNAL MEDICINE

## 2025-01-28 PROCEDURE — 97110 THERAPEUTIC EXERCISES: CPT | Mod: GP

## 2025-01-28 PROCEDURE — 999N000157 HC STATISTIC RCP TIME EA 10 MIN

## 2025-01-28 PROCEDURE — 82805 BLOOD GASES W/O2 SATURATION: CPT | Performed by: INTERNAL MEDICINE

## 2025-01-28 PROCEDURE — 250N000013 HC RX MED GY IP 250 OP 250 PS 637: Performed by: STUDENT IN AN ORGANIZED HEALTH CARE EDUCATION/TRAINING PROGRAM

## 2025-01-28 PROCEDURE — 258N000003 HC RX IP 258 OP 636: Performed by: INTERNAL MEDICINE

## 2025-01-28 PROCEDURE — 94640 AIRWAY INHALATION TREATMENT: CPT | Mod: 76

## 2025-01-28 PROCEDURE — 94660 CPAP INITIATION&MGMT: CPT

## 2025-01-28 PROCEDURE — 99233 SBSQ HOSP IP/OBS HIGH 50: CPT | Mod: GC | Performed by: INTERNAL MEDICINE

## 2025-01-28 PROCEDURE — 94640 AIRWAY INHALATION TREATMENT: CPT

## 2025-01-28 RX ORDER — HEPARIN SODIUM 1000 [USP'U]/ML
500 INJECTION, SOLUTION INTRAVENOUS; SUBCUTANEOUS CONTINUOUS
Status: DISCONTINUED | OUTPATIENT
Start: 2025-01-28 | End: 2025-01-28

## 2025-01-28 RX ORDER — QUETIAPINE FUMARATE 25 MG/1
25 TABLET, FILM COATED ORAL EVERY MORNING
Status: DISCONTINUED | OUTPATIENT
Start: 2025-01-29 | End: 2025-02-02

## 2025-01-28 RX ORDER — NICOTINE POLACRILEX 4 MG
15-30 LOZENGE BUCCAL
Status: DISCONTINUED | OUTPATIENT
Start: 2025-01-28 | End: 2025-02-26 | Stop reason: HOSPADM

## 2025-01-28 RX ORDER — DEXTROSE MONOHYDRATE 25 G/50ML
25-50 INJECTION, SOLUTION INTRAVENOUS
Status: DISCONTINUED | OUTPATIENT
Start: 2025-01-28 | End: 2025-02-26 | Stop reason: HOSPADM

## 2025-01-28 RX ORDER — ALBUMIN (HUMAN) 12.5 G/50ML
50 SOLUTION INTRAVENOUS
Status: DISCONTINUED | OUTPATIENT
Start: 2025-01-28 | End: 2025-01-28

## 2025-01-28 RX ORDER — QUETIAPINE FUMARATE 25 MG/1
50 TABLET, FILM COATED ORAL AT BEDTIME
Status: DISCONTINUED | OUTPATIENT
Start: 2025-01-28 | End: 2025-02-02

## 2025-01-28 RX ADMIN — Medication 40 MG: at 08:35

## 2025-01-28 RX ADMIN — SODIUM CHLORIDE 200 ML: 9 INJECTION, SOLUTION INTRAVENOUS at 13:45

## 2025-01-28 RX ADMIN — IPRATROPIUM BROMIDE AND ALBUTEROL SULFATE 3 ML: .5; 3 SOLUTION RESPIRATORY (INHALATION) at 20:04

## 2025-01-28 RX ADMIN — INSULIN GLARGINE 15 UNITS: 100 INJECTION, SOLUTION SUBCUTANEOUS at 21:00

## 2025-01-28 RX ADMIN — HEPARIN SODIUM 500 UNITS: 1000 INJECTION INTRAVENOUS; SUBCUTANEOUS at 13:46

## 2025-01-28 RX ADMIN — SERTRALINE HYDROCHLORIDE 75 MG: 50 TABLET ORAL at 19:51

## 2025-01-28 RX ADMIN — SODIUM CHLORIDE 250 ML: 9 INJECTION, SOLUTION INTRAVENOUS at 13:47

## 2025-01-28 RX ADMIN — LIDOCAINE HYDROCHLORIDE 0.5 ML: 10 INJECTION, SOLUTION EPIDURAL; INFILTRATION; INTRACAUDAL; PERINEURAL at 13:45

## 2025-01-28 RX ADMIN — QUETIAPINE FUMARATE 50 MG: 50 TABLET ORAL at 21:00

## 2025-01-28 RX ADMIN — SEVELAMER CARBONATE 800 MG: 800 TABLET, FILM COATED ORAL at 11:08

## 2025-01-28 RX ADMIN — HEPARIN SODIUM 500 UNITS/HR: 1000 INJECTION INTRAVENOUS; SUBCUTANEOUS at 13:46

## 2025-01-28 RX ADMIN — QUETIAPINE 12.5 MG: 25 TABLET, FILM COATED ORAL at 11:08

## 2025-01-28 RX ADMIN — MICONAZOLE NITRATE: 2 POWDER TOPICAL at 08:36

## 2025-01-28 RX ADMIN — AMIODARONE HYDROCHLORIDE 200 MG: 200 TABLET ORAL at 08:35

## 2025-01-28 RX ADMIN — Medication 5 ML: at 08:39

## 2025-01-28 RX ADMIN — APIXABAN 5 MG: 5 TABLET, FILM COATED ORAL at 20:00

## 2025-01-28 RX ADMIN — SEVELAMER CARBONATE 800 MG: 800 TABLET, FILM COATED ORAL at 08:35

## 2025-01-28 RX ADMIN — Medication: at 13:46

## 2025-01-28 RX ADMIN — QUETIAPINE 12.5 MG: 25 TABLET, FILM COATED ORAL at 08:35

## 2025-01-28 RX ADMIN — IPRATROPIUM BROMIDE AND ALBUTEROL SULFATE 3 ML: .5; 3 SOLUTION RESPIRATORY (INHALATION) at 08:18

## 2025-01-28 RX ADMIN — DILTIAZEM HYDROCHLORIDE 5 MG/HR: 5 INJECTION, SOLUTION INTRAVENOUS at 09:36

## 2025-01-28 RX ADMIN — Medication 50 MCG: at 08:35

## 2025-01-28 RX ADMIN — APIXABAN 5 MG: 5 TABLET, FILM COATED ORAL at 08:35

## 2025-01-28 RX ADMIN — INSULIN GLARGINE 15 UNITS: 100 INJECTION, SOLUTION SUBCUTANEOUS at 13:29

## 2025-01-28 RX ADMIN — IPRATROPIUM BROMIDE AND ALBUTEROL SULFATE 3 ML: .5; 3 SOLUTION RESPIRATORY (INHALATION) at 11:47

## 2025-01-28 RX ADMIN — SEVELAMER CARBONATE 800 MG: 800 TABLET, FILM COATED ORAL at 17:01

## 2025-01-28 RX ADMIN — DILTIAZEM HYDROCHLORIDE 15 MG/HR: 5 INJECTION, SOLUTION INTRAVENOUS at 16:53

## 2025-01-28 RX ADMIN — METOPROLOL TARTRATE 5 MG: 5 INJECTION INTRAVENOUS at 04:58

## 2025-01-28 RX ADMIN — TACROLIMUS: 1 OINTMENT TOPICAL at 08:36

## 2025-01-28 RX ADMIN — CETIRIZINE HYDROCHLORIDE 5 MG: 5 TABLET ORAL at 21:00

## 2025-01-28 RX ADMIN — TACROLIMUS: 1 OINTMENT TOPICAL at 20:00

## 2025-01-28 RX ADMIN — MICONAZOLE NITRATE: 2 POWDER TOPICAL at 20:00

## 2025-01-28 RX ADMIN — IPRATROPIUM BROMIDE AND ALBUTEROL SULFATE 3 ML: .5; 3 SOLUTION RESPIRATORY (INHALATION) at 15:02

## 2025-01-28 RX ADMIN — BUDESONIDE 1 MG: 1 INHALANT ORAL at 08:18

## 2025-01-28 RX ADMIN — BUDESONIDE 1 MG: 1 INHALANT ORAL at 20:04

## 2025-01-28 RX ADMIN — ATORVASTATIN CALCIUM 20 MG: 20 TABLET, FILM COATED ORAL at 19:51

## 2025-01-28 ASSESSMENT — ACTIVITIES OF DAILY LIVING (ADL)
ADLS_ACUITY_SCORE: 86
ADLS_ACUITY_SCORE: 90
ADLS_ACUITY_SCORE: 86
ADLS_ACUITY_SCORE: 86
ADLS_ACUITY_SCORE: 90
ADLS_ACUITY_SCORE: 84
ADLS_ACUITY_SCORE: 90
ADLS_ACUITY_SCORE: 84
ADLS_ACUITY_SCORE: 90
ADLS_ACUITY_SCORE: 86
ADLS_ACUITY_SCORE: 90
ADLS_ACUITY_SCORE: 84
ADLS_ACUITY_SCORE: 86
ADLS_ACUITY_SCORE: 90
ADLS_ACUITY_SCORE: 90
ADLS_ACUITY_SCORE: 86
ADLS_ACUITY_SCORE: 86
ADLS_ACUITY_SCORE: 90
ADLS_ACUITY_SCORE: 90

## 2025-01-28 NOTE — PROGRESS NOTES
RT Shift Summary:    Patient on HFNC 35L, 35% since 1345 this afternoon, tolerating well. Patient to use BiPAP overnight and as needed otherwise. Continues receiving Duoneb QID, Pulmicort BID. Will continue to follow.    Guillermo Fernandez, RRT  01/28/25

## 2025-01-28 NOTE — PLAN OF CARE
"Neuro: oriented x3 (confused to situation), PERRLA, opens eyes spontaneously, follows commands, GELLER, anxious at times, precedex off since noon     CV: tele a-fib, PRN metoprolol given x2 for HR > 120    Resp: LS diminished, off bipap since noon, weaned from HFNC to 2L, back on BiPAP at 19:00    GI: BS audible, no BM since 1/24, diet advanced to full liquid with mildly thickened liquids per speech    : anuric, HD run today removed 3.5L    Skin: redness/rash to skin folds, right heel cracked    Activity: assist of 2 + lift, up to chair x2 hours    Additional: PRN tylenol x1, afebrile, electrolytes WDL, daughter updated at bedside, plan to monitor respiratory status and mental status overnight    -----------------------------------------------------------------------------------------------    Goal Outcome Evaluation:    Problem: Adult Inpatient Plan of Care  Goal: Plan of Care Review  Description: The Plan of Care Review/Shift note should be completed every shift.  The Outcome Evaluation is a brief statement about your assessment that the patient is improving, declining, or no change.  This information will be displayed automatically on your shift  note.  Outcome: Progressing  Goal: Patient-Specific Goal (Individualized)  Description: You can add care plan individualizations to a care plan. Examples of Individualization might be:  \"Parent requests to be called daily at 9am for status\", \"I have a hard time hearing out of my right ear\", or \"Do not touch me to wake me up as it startles  me\".  Outcome: Progressing  Goal: Absence of Hospital-Acquired Illness or Injury  Outcome: Progressing  Intervention: Identify and Manage Fall Risk  Recent Flowsheet Documentation  Taken 1/27/2025 1600 by Karlene Esquivel RN  Safety Promotion/Fall Prevention:   activity supervised   clutter free environment maintained   increased rounding and observation   treat reversible contributory factors   safety round/check completed   room " near nurse's station   patient and family education   assistive device/personal items within reach   increase visualization of patient   lighting adjusted   mobility aid in reach   nonskid shoes/slippers when out of bed   room door open   room organization consistent   supervised activity   treat underlying cause  Taken 1/27/2025 1200 by Karlene Esquivel RN  Safety Promotion/Fall Prevention:   activity supervised   clutter free environment maintained   increased rounding and observation   treat reversible contributory factors   safety round/check completed   room near nurse's station   patient and family education   assistive device/personal items within reach   increase visualization of patient   lighting adjusted   mobility aid in reach   nonskid shoes/slippers when out of bed   room door open   room organization consistent   supervised activity   treat underlying cause  Taken 1/27/2025 0800 by Karlene Esquivel RN  Safety Promotion/Fall Prevention:   activity supervised   clutter free environment maintained   increased rounding and observation   treat reversible contributory factors   safety round/check completed   room near nurse's station   patient and family education   assistive device/personal items within reach   increase visualization of patient   lighting adjusted   mobility aid in reach   nonskid shoes/slippers when out of bed   room door open   room organization consistent   supervised activity   treat underlying cause  Intervention: Prevent Skin Injury  Recent Flowsheet Documentation  Taken 1/27/2025 1800 by Karlene Esquivel RN  Body Position:   turned   right   heels elevated  Taken 1/27/2025 1700 by Karlene Esquivel RN  Body Position:   turned   left   heels elevated  Taken 1/27/2025 1600 by Karlene Esquivel RN  Body Position: weight shifting  Skin Protection:   adhesive use limited   silicone foam dressing in place   pulse oximeter probe site changed   incontinence pads utilized    tubing/devices free from skin contact  Taken 1/27/2025 1400 by Karlene Esquivel RN  Body Position:   turned   right   heels elevated  Taken 1/27/2025 1200 by Karlene Esquivel RN  Body Position:   turned   left   heels elevated  Skin Protection:   adhesive use limited   silicone foam dressing in place   pulse oximeter probe site changed   incontinence pads utilized   tubing/devices free from skin contact  Taken 1/27/2025 1000 by Karlene Esquivel RN  Body Position:   turned   right   heels elevated  Taken 1/27/2025 0800 by Karlene Esquivel RN  Body Position:   turned   left   heels elevated  Skin Protection:   adhesive use limited   silicone foam dressing in place   pulse oximeter probe site changed   incontinence pads utilized   tubing/devices free from skin contact  Intervention: Prevent and Manage VTE (Venous Thromboembolism) Risk  Recent Flowsheet Documentation  Taken 1/27/2025 1600 by Karlene Esquivel RN  VTE Prevention/Management: SCDs on (sequential compression devices)  Taken 1/27/2025 1200 by Karlene Esquivel RN  VTE Prevention/Management: SCDs on (sequential compression devices)  Taken 1/27/2025 0800 by Karlene Esquivel RN  VTE Prevention/Management: SCDs on (sequential compression devices)  Intervention: Prevent Infection  Recent Flowsheet Documentation  Taken 1/27/2025 1600 by Karlene Esquivel RN  Infection Prevention:   cohorting utilized   environmental surveillance performed   equipment surfaces disinfected   hand hygiene promoted   personal protective equipment utilized   rest/sleep promoted   single patient room provided  Taken 1/27/2025 1200 by Karlene Esquivel RN  Infection Prevention:   cohorting utilized   environmental surveillance performed   equipment surfaces disinfected   hand hygiene promoted   personal protective equipment utilized   rest/sleep promoted   single patient room provided  Taken 1/27/2025 0800 by Karlene Esquivel RN  Infection Prevention:   cohorting  utilized   environmental surveillance performed   equipment surfaces disinfected   hand hygiene promoted   personal protective equipment utilized   rest/sleep promoted   single patient room provided  Goal: Optimal Comfort and Wellbeing  Outcome: Progressing  Intervention: Monitor Pain and Promote Comfort  Recent Flowsheet Documentation  Taken 1/27/2025 1838 by Karlene Esquivel RN  Pain Management Interventions: medication (see MAR)  Taken 1/27/2025 1200 by Karlene Esquivel RN  Pain Management Interventions: repositioned  Taken 1/27/2025 0800 by Karlene Esquivel RN  Pain Management Interventions: repositioned  Intervention: Provide Person-Centered Care  Recent Flowsheet Documentation  Taken 1/27/2025 1600 by Karlene Esquivel RN  Trust Relationship/Rapport:   care explained   choices provided   emotional support provided   reassurance provided   thoughts/feelings acknowledged   empathic listening provided   questions answered   questions encouraged  Taken 1/27/2025 1200 by Karlene Esquivel RN  Trust Relationship/Rapport:   care explained   choices provided   emotional support provided   reassurance provided   thoughts/feelings acknowledged   empathic listening provided   questions answered   questions encouraged  Taken 1/27/2025 0800 by Karlene Esquivel RN  Trust Relationship/Rapport:   care explained   choices provided   emotional support provided   reassurance provided   thoughts/feelings acknowledged   empathic listening provided   questions answered   questions encouraged  Goal: Readiness for Transition of Care  Outcome: Progressing     Problem: Fall Injury Risk  Goal: Absence of Fall and Fall-Related Injury  Outcome: Progressing  Intervention: Identify and Manage Contributors  Recent Flowsheet Documentation  Taken 1/27/2025 1600 by Karlene Esquivel RN  Medication Review/Management:   medications reviewed   high-risk medications identified  Taken 1/27/2025 1200 by Karlene Esquivel RN  Medication  Review/Management:   medications reviewed   high-risk medications identified  Taken 1/27/2025 0800 by Karlene Esquivel RN  Medication Review/Management:   medications reviewed   high-risk medications identified  Intervention: Promote Injury-Free Environment  Recent Flowsheet Documentation  Taken 1/27/2025 1600 by Karlene Esquivel RN  Safety Promotion/Fall Prevention:   activity supervised   clutter free environment maintained   increased rounding and observation   treat reversible contributory factors   safety round/check completed   room near nurse's station   patient and family education   assistive device/personal items within reach   increase visualization of patient   lighting adjusted   mobility aid in reach   nonskid shoes/slippers when out of bed   room door open   room organization consistent   supervised activity   treat underlying cause  Taken 1/27/2025 1200 by Karlene Esquivel RN  Safety Promotion/Fall Prevention:   activity supervised   clutter free environment maintained   increased rounding and observation   treat reversible contributory factors   safety round/check completed   room near nurse's station   patient and family education   assistive device/personal items within reach   increase visualization of patient   lighting adjusted   mobility aid in reach   nonskid shoes/slippers when out of bed   room door open   room organization consistent   supervised activity   treat underlying cause  Taken 1/27/2025 0800 by Karlene Esquivel RN  Safety Promotion/Fall Prevention:   activity supervised   clutter free environment maintained   increased rounding and observation   treat reversible contributory factors   safety round/check completed   room near nurse's station   patient and family education   assistive device/personal items within reach   increase visualization of patient   lighting adjusted   mobility aid in reach   nonskid shoes/slippers when out of bed   room door open   room organization  consistent   supervised activity   treat underlying cause     Problem: Pain Acute  Goal: Optimal Pain Control and Function  Outcome: Progressing  Intervention: Optimize Psychosocial Wellbeing  Recent Flowsheet Documentation  Taken 1/27/2025 1600 by Karlene Esquivel RN  Supportive Measures:   active listening utilized   problem-solving facilitated   relaxation techniques promoted   self-care encouraged   decision-making supported   goal-setting facilitated   positive reinforcement provided   verbalization of feelings encouraged  Taken 1/27/2025 1200 by Karlene Esquivel RN  Supportive Measures:   active listening utilized   problem-solving facilitated   relaxation techniques promoted   self-care encouraged   decision-making supported   goal-setting facilitated   positive reinforcement provided   verbalization of feelings encouraged  Taken 1/27/2025 0800 by Karlene Esquivel RN  Supportive Measures:   active listening utilized   problem-solving facilitated   relaxation techniques promoted   self-care encouraged   decision-making supported   goal-setting facilitated   positive reinforcement provided   verbalization of feelings encouraged  Intervention: Develop Pain Management Plan  Recent Flowsheet Documentation  Taken 1/27/2025 1838 by Karlene Esquivel RN  Pain Management Interventions: medication (see MAR)  Taken 1/27/2025 1200 by Karlene Esquivel RN  Pain Management Interventions: repositioned  Taken 1/27/2025 0800 by Karlene Esquivel RN  Pain Management Interventions: repositioned  Intervention: Prevent or Manage Pain  Recent Flowsheet Documentation  Taken 1/27/2025 1600 by Karlene Esquivel RN  Sensory Stimulation Regulation:   care clustered   television on  Bowel Elimination Promotion: adequate fluid intake promoted  Medication Review/Management:   medications reviewed   high-risk medications identified  Taken 1/27/2025 1200 by Karlene Esquivel RN  Sensory Stimulation Regulation:   care clustered    television on  Bowel Elimination Promotion: adequate fluid intake promoted  Medication Review/Management:   medications reviewed   high-risk medications identified  Taken 1/27/2025 0800 by Karlene Esquivel RN  Sensory Stimulation Regulation:   care clustered   television on  Bowel Elimination Promotion: adequate fluid intake promoted  Medication Review/Management:   medications reviewed   high-risk medications identified     Problem: Gas Exchange Impaired  Goal: Optimal Gas Exchange  Outcome: Progressing  Intervention: Optimize Oxygenation and Ventilation  Recent Flowsheet Documentation  Taken 1/27/2025 1800 by Karlene Esquivel RN  Head of Bed (HOB) Positioning: HOB at 30-45 degrees  Taken 1/27/2025 1700 by Karlene Esquivel RN  Head of Bed (HOB) Positioning: HOB at 30-45 degrees  Taken 1/27/2025 1600 by Karlene Esquivel RN  Airway/Ventilation Management:   airway patency maintained   humidification applied   oxygen therapy provided   pulmonary hygiene promoted   calming measures promoted  Taken 1/27/2025 1400 by Karlene Esquivel RN  Head of Bed (HOB) Positioning: HOB at 30-45 degrees  Taken 1/27/2025 1200 by Karlene Esquivel RN  Airway/Ventilation Management:   airway patency maintained   humidification applied   oxygen therapy provided   pulmonary hygiene promoted   calming measures promoted  Head of Bed (HOB) Positioning: HOB at 30-45 degrees  Taken 1/27/2025 1000 by Karlene Esquivel RN  Head of Bed (HOB) Positioning: HOB at 30-45 degrees  Taken 1/27/2025 0800 by Karlene Esquivel RN  Airway/Ventilation Management:   airway patency maintained   humidification applied   oxygen therapy provided   pulmonary hygiene promoted   calming measures promoted  Head of Bed (HOB) Positioning: HOB at 30 degrees     Problem: Risk for Delirium  Goal: Optimal Coping  Outcome: Progressing  Intervention: Optimize Psychosocial Adjustment to Delirium  Recent Flowsheet Documentation  Taken 1/27/2025 1600 by Alvin  Karlene DAIGLE RN  Supportive Measures:   active listening utilized   problem-solving facilitated   relaxation techniques promoted   self-care encouraged   decision-making supported   goal-setting facilitated   positive reinforcement provided   verbalization of feelings encouraged  Family/Support System Care:   involvement promoted   presence promoted   self-care encouraged   support provided  Taken 1/27/2025 1200 by Karlene Esquivel RN  Supportive Measures:   active listening utilized   problem-solving facilitated   relaxation techniques promoted   self-care encouraged   decision-making supported   goal-setting facilitated   positive reinforcement provided   verbalization of feelings encouraged  Family/Support System Care:   involvement promoted   presence promoted   self-care encouraged   support provided  Taken 1/27/2025 0800 by Karlene Esquivel RN  Supportive Measures:   active listening utilized   problem-solving facilitated   relaxation techniques promoted   self-care encouraged   decision-making supported   goal-setting facilitated   positive reinforcement provided   verbalization of feelings encouraged  Family/Support System Care:   involvement promoted   presence promoted   self-care encouraged   support provided  Goal: Improved Behavioral Control  Outcome: Progressing  Intervention: Prevent and Manage Agitation  Recent Flowsheet Documentation  Taken 1/27/2025 1600 by Karlene Esquivel RN  Environment Familiarity/Consistency: daily routine followed  Taken 1/27/2025 1200 by Karlene Esquivel RN  Environment Familiarity/Consistency: daily routine followed  Taken 1/27/2025 0800 by Karlene Esquivel RN  Environment Familiarity/Consistency: daily routine followed  Intervention: Minimize Safety Risk  Recent Flowsheet Documentation  Taken 1/27/2025 1600 by Karlene Esquivel RN  Enhanced Safety Measures:   room near unit station   assistive devices when indicated   monitor patients coagulation values   pain  management   patient/family teach back on injury risk   review medications for side effects with activity  Trust Relationship/Rapport:   care explained   choices provided   emotional support provided   reassurance provided   thoughts/feelings acknowledged   empathic listening provided   questions answered   questions encouraged  Taken 1/27/2025 1200 by Karlene Esquivel RN  Enhanced Safety Measures:   room near unit station   assistive devices when indicated   monitor patients coagulation values   pain management   patient/family teach back on injury risk   review medications for side effects with activity  Trust Relationship/Rapport:   care explained   choices provided   emotional support provided   reassurance provided   thoughts/feelings acknowledged   empathic listening provided   questions answered   questions encouraged  Taken 1/27/2025 0800 by Karlene Esquivel RN  Enhanced Safety Measures:   room near unit station   assistive devices when indicated   monitor patients coagulation values   pain management   patient/family teach back on injury risk   review medications for side effects with activity  Trust Relationship/Rapport:   care explained   choices provided   emotional support provided   reassurance provided   thoughts/feelings acknowledged   empathic listening provided   questions answered   questions encouraged  Goal: Improved Attention and Thought Clarity  Outcome: Progressing  Intervention: Maximize Cognitive Function  Recent Flowsheet Documentation  Taken 1/27/2025 1600 by Karlene Esquivel RN  Sensory Stimulation Regulation:   care clustered   television on  Reorientation Measures:   reorientation provided   clock in view   glasses use encouraged   calendar in view   familiar social contact encouraged  Taken 1/27/2025 1200 by Karlene Esquivel RN  Sensory Stimulation Regulation:   care clustered   television on  Reorientation Measures:   reorientation provided   clock in view   glasses use  encouraged   calendar in view   familiar social contact encouraged  Taken 1/27/2025 0800 by Karlene Esquivel, RN  Sensory Stimulation Regulation:   care clustered   television on  Reorientation Measures:   reorientation provided   clock in view   glasses use encouraged   calendar in view   familiar social contact encouraged  Goal: Improved Sleep  Outcome: Progressing

## 2025-01-28 NOTE — PROGRESS NOTES
Care Management Follow Up    Length of Stay (days): 20    Expected Discharge Date: 01/30/2025     Concerns to be Addressed: other (see comments) (elevated risk)     Patient plan of care discussed at interdisciplinary rounds: Yes    Anticipated Discharge Disposition: Skilled Nursing Facility      Anticipated Discharge Services: None  Anticipated Discharge DME: None    Patient/family educated on Medicare website which has current facility and service quality ratings: no  Education Provided on the Discharge Plan: No  Patient/Family in Agreement with the Plan: yes    Referrals Placed by CM/SW:    Private pay costs discussed: Not applicable    Discussed  Partnership in Safe Discharge Planning  document with patient/family: Yes:     Handoff Completed: Yes, non-[facility] PCP: External handoff communication completed    Additional Information:  PT recommends TCU, patient still requiring ICU level of care     Next Steps: SW/CC continue to follow for discharge planning     TOMER Mckeon

## 2025-01-28 NOTE — PROGRESS NOTES
CLINICAL NUTRITION SERVICES - REASSESSMENT NOTE     Malnutrition Status:  (1/21)  % Intake: Decreased intake does not meet criteria (day #4 NPO, TF was previously at goal)  % Weight Loss: Weight loss does not meet criteria  (fluids, down closer to admit weight)  Subcutaneous Fat Loss: None observed (visual, in isolation)  Muscle Loss: None observed (visual, in isolation) - may be masked by obesity   Fluid Accumulation/Edema: Mild, 1+ (likely not nutritional)  Malnutrition Diagnosis: Patient does not meet two of the established criteria necessary for diagnosing malnutrition  Malnutrition Present on Admission: Unable to assess    Registered Dietitian Interventions:  Enteral nutrition management - Change goal TF regimen to [employer] 1.4 at 55 mL/hr = 1848 kcal (28 kcal/kg), 82 g protein (1.3 g/kg), 207 g CHO, 20 g fiber, 937 mL H2O  Team meeting involving nutrition professional - Patient discussed today during interdisciplinary bedside rounds       SUBJECTIVE INFORMATION  Patient not available for interview due to respiratory status (Bipap)    CURRENT NUTRITION ORDERS  Diet: NPO on Bipap  Unable to do VSS today due to Bipap  Yesterday SLP ok'd Full Liquid mildly thick liquids   Nutrition Support: TF advanced to goal on 1/22 and continues  as follows ~    Nutrition Support Enteral:  Type of Feeding Tube: ND  Enteral Frequency:  Continuous  Enteral Regimen: Vital HP at 55 mL/hr  Total Enteral Provisions: 1320 kcal (20 kcal/kg and 80% needs), 115 g protein (1.8 g/kg and 115% needs), 147 g CHO, 0 g fiber, 1104 mL H2O  Free Water Flush: Discontinued on 1/25      CURRENT INTAKE/TOLERANCE  No oral intake d/t Bipap reliance  Tolerating TF without any documented issues      NEW FINDINGS  Weight: 103.9 kg (up significantly from admit - fluids), I/O 2365/3500  Skin/wounds: WOCN following for buttock wound (d/t friction) - stable as of 1/15  GI symptoms: Last BM 1/24, BM x 3 on 1/23  Nutrition-relevant labs:  BUN 77.7 (H), Cr  3.29 (H) - CKD on HD, BGM  (Insulin drip + Lantus 15 units BID + High SSI)    Nutrition-relevant medications:  Insulin drip, Lantus 15 units BID, High sliding scale insulin, Nephronex, Vitamin D3    1/25: Extubated   1/27: SLP = FL mildly thick     ASSESSED NUTRITION NEEDS (DW = 65.5 kg (adjusted))  Estimated Energy Needs: 3190-0318 kcals/day (25-30 kcals/kg)  Justification: Obese  Estimated Protein Needs:  grams protein/day (1.2-1.5 grams of pro/kg)  Justification: Hypercatabolism with acute illness, HD  Estimated Fluid Needs: 7562-0022 mL/day (1 mL/kcal)  Justification:  or per MD     EVALUATION OF THE PROGRESS TOWARD GOALS   Previous Goals (1/21)  Goal TF regimen will meet % needs while NPO   Evaluation: Unable to meet    Previous Nutrition Diagnosis (1/21)  Inadequate protein energy intake related to NPO with plans to resume TF as evidenced by meeting 0% needs   Evaluation: Improving    NUTRITION DIAGNOSIS  Inadequate energy intake related to TF at goal, needs re-estimated with extubation as evidenced by meeting 80% low end energy needs     INTERVENTIONS  Enteral nutrition management - Change goal TF regimen to [employer] 1.4 at 55 mL/hr = 1848 kcal (28 kcal/kg), 82 g protein (1.3 g/kg), 207 g CHO, 20 g fiber, 937 mL H2O  Team meeting involving nutrition professional - Patient discussed today during interdisciplinary bedside rounds    Goals  TF regimen will meet % needs      Monitoring/Evaluation      Progress toward goals will be monitored and evaluated per policy    Miguelina Feliciano, RD, LD, CNSC   Clinical Dietitian - [facility]

## 2025-01-28 NOTE — PROGRESS NOTES
BiPAP Settings  IPAP: 14  EPAP: 6  Rate: 16  FiO2: 30  Timed Inspiration (sec): 0.9    CPAP/BiPAP/AVAPS/AVAPS AE Alarms  High Pressure: 30   Low Pressure: 5   Low Pressure Delay: 20  High Rate (breaths/min): 45  Low Rate (breaths/min): 5  Alarm Volume Level:     CPAP/BiPAP/AVAPS/AVAPS AE Patient Assessment  Skin Assessment: clean  Barriers Applied: applied.  Lung Sounds: clear and diminished.    Margret San, RT  1/28/2025

## 2025-01-28 NOTE — PLAN OF CARE
Goal Outcome Evaluation:      Plan of Care Reviewed With: patient    Overall Patient Progress: no changeOverall Patient Progress: no change    Outcome Evaluation: Patient is disoriented to situation. Restless and frequently trying to pull at lines and tubes. On BIPAP 30% 14/6 during the noc. Continues to be in afib RVR. MD aware. PRN metoprolol given x2. BM x2.      Problem: Risk for Delirium  Goal: Optimal Coping  Outcome: Not Progressing  Intervention: Optimize Psychosocial Adjustment to Delirium  Recent Flowsheet Documentation  Taken 1/28/2025 0400 by Carly Robb RN  Supportive Measures:   active listening utilized   problem-solving facilitated   relaxation techniques promoted   self-care encouraged   decision-making supported   goal-setting facilitated   positive reinforcement provided   verbalization of feelings encouraged  Taken 1/28/2025 0000 by Carly Robb RN  Supportive Measures:   active listening utilized   problem-solving facilitated   relaxation techniques promoted   self-care encouraged   decision-making supported   goal-setting facilitated   positive reinforcement provided   verbalization of feelings encouraged  Taken 1/27/2025 2000 by Carly Robb RN  Supportive Measures:   active listening utilized   problem-solving facilitated   relaxation techniques promoted   self-care encouraged   decision-making supported   goal-setting facilitated   positive reinforcement provided   verbalization of feelings encouraged  Family/Support System Care:   involvement promoted   presence promoted   self-care encouraged   support provided  Goal: Improved Behavioral Control  Outcome: Not Progressing  Intervention: Prevent and Manage Agitation  Recent Flowsheet Documentation  Taken 1/28/2025 0400 by Carly Robb RN  Environment Familiarity/Consistency: daily routine followed  Taken 1/27/2025 2000 by Carly Robb RN  Environment Familiarity/Consistency: daily routine  followed  Intervention: Minimize Safety Risk  Recent Flowsheet Documentation  Taken 1/28/2025 0400 by Carly Robb RN  Enhanced Safety Measures:   room near unit station   assistive devices when indicated   monitor patients coagulation values   pain management   patient/family teach back on injury risk   review medications for side effects with activity  Taken 1/28/2025 0000 by Carly Robb RN  Enhanced Safety Measures:   room near unit station   assistive devices when indicated   monitor patients coagulation values   pain management   patient/family teach back on injury risk   review medications for side effects with activity  Trust Relationship/Rapport:   care explained   choices provided   emotional support provided   reassurance provided   thoughts/feelings acknowledged   empathic listening provided   questions answered   questions encouraged  Taken 1/27/2025 2000 by Carly Robb RN  Enhanced Safety Measures:   room near unit station   assistive devices when indicated   monitor patients coagulation values   pain management   patient/family teach back on injury risk   review medications for side effects with activity  Trust Relationship/Rapport:   care explained   choices provided   emotional support provided   reassurance provided   thoughts/feelings acknowledged   empathic listening provided   questions answered   questions encouraged  Goal: Improved Attention and Thought Clarity  Outcome: Not Progressing  Intervention: Maximize Cognitive Function  Recent Flowsheet Documentation  Taken 1/28/2025 0400 by Carly Robb RN  Sensory Stimulation Regulation:   care clustered   television on  Reorientation Measures:   reorientation provided   clock in view   glasses use encouraged   calendar in view   familiar social contact encouraged  Taken 1/28/2025 0000 by Carly Robb RN  Sensory Stimulation Regulation:   care clustered   television on  Reorientation Measures:   reorientation  provided   clock in view   glasses use encouraged   calendar in view   familiar social contact encouraged  Taken 1/27/2025 2000 by Carly Robb, RN  Sensory Stimulation Regulation:   care clustered   television on  Reorientation Measures:   reorientation provided   clock in view   glasses use encouraged   calendar in view   familiar social contact encouraged  Goal: Improved Sleep  Outcome: Not Progressing     Problem: Adult Inpatient Plan of Care  Goal: Plan of Care Review  Description: The Plan of Care Review/Shift note should be completed every shift.  The Outcome Evaluation is a brief statement about your assessment that the patient is improving, declining, or no change.  This information will be displayed automatically on your shift  note.  Outcome: Progressing  Flowsheets (Taken 1/28/2025 0556)  Outcome Evaluation: Patient is disoriented to situation. Restless and frequently trying to pull at lines and tubes. On BIPAP 30% 14/6 during the noc. Continues to be in afib RVR. MD aware. PRN metoprolol given x2. BM x2.  Plan of Care Reviewed With: patient  Overall Patient Progress: no change     Problem: Gas Exchange Impaired  Goal: Optimal Gas Exchange  Outcome: Progressing  Intervention: Optimize Oxygenation and Ventilation  Recent Flowsheet Documentation  Taken 1/28/2025 0400 by Carly Robb, RN  Airway/Ventilation Management:   airway patency maintained   humidification applied   oxygen therapy provided   pulmonary hygiene promoted   calming measures promoted  Head of Bed (HOB) Positioning: HOB at 30 degrees  Taken 1/28/2025 0200 by Carly Robb, RN  Head of Bed (HOB) Positioning: HOB at 30 degrees  Taken 1/28/2025 0000 by Carly Robb, RN  Airway/Ventilation Management:   airway patency maintained   humidification applied   oxygen therapy provided   pulmonary hygiene promoted   calming measures promoted  Head of Bed (HOB) Positioning: HOB at 30 degrees  Taken 1/27/2025 2200 by Clarisse  Carly WONG, RN  Head of Bed (HOB) Positioning: HOB at 30-45 degrees  Taken 1/27/2025 2000 by Carly Robb, RN  Airway/Ventilation Management:   airway patency maintained   humidification applied   oxygen therapy provided   pulmonary hygiene promoted   calming measures promoted  Head of Bed (HOB) Positioning: HOB at 30-45 degrees

## 2025-01-28 NOTE — PROGRESS NOTES
ICU Progress Note   Date of Service: 01/28/25    Assessment and Plan:  75 year old female with PMHx significant for ESRD on HD, CHF, COPD, poor Mobility (L AKA, Obesity, WC bound), DM type II, hypertension.  She presented to the ER on 1/8/25 complaining of shortness of breath and was diagnosed with Influenza A. On 1/9/25 she was transferred to the ICU and intubated due to worsening acute on chronic respiratory failure. On the evening of 1/18/25 she was extubated but she was reintubated on 1/20.      Overnight events:   BiPAP overnight, did not tolerate transition to NC this AM  Tolerated dialysis yesterday  Remains in Afib with intermittent RVR     Neuro  Likely baseline cognitive impairment  Anxiety  - Precedex as needed for anxeity  - Increase 25 mg in AM, 50 mg qHS  - PTA Zoloft, gabapentin     Pulmonary  Acute hypoxemic respiratory failure  Influenza A  COPD exacerbation  Hx of JONE  ? Hospital acquired pneumonia  Upper airway swelling/ pharyngeal edema  - Extubated 1/25  - BiPAP PRN  - Completed high dose steroids with dexa 10 mg q6x 48 hours  - Continue nebs  - Restart pulmicort nebs after steroid burst  - HAP antibiotics as per ID section     Cardiac  Shock, likely due to sedation (improved)  Afib now rate controlled  - MAP goal > 65, currently off norepi  - Continue PO amio for RVR, PRN metoprolol  - Add diltiazem gtt  - DOAC for Afib     Renal  ESRD on HD  - Nephrology consult, appreciate recs  - iHD as per schedule     GI  No active issues  - RD to manage TFs     Heme/Onc  Chronic anemia  Hx of Afib  - monitor CBC  - Transfuse for Hgb < 7  - DOAC for Afib     ID  Influenza A pneumona  ? Hospital acquired pneumonia  - Tamiflu treatment completed  - Cefepime/Vanc restarted 1/20 after bronch  - BAL cultures with actinomyces, CT chest with RML infiltrate vs atelectasis but no sign of actinomycosis, this is likely colonization.  - given upper airway swelling and hx of PCN allergy, switched from cefepime to  "levofloxacin, last dose 1/26  - Monitor off antibiotics     Endo  Hx of DM  - monitor BG  - insulin gtt, will attempt to transition to sliding scale insulin  - Glargine 15 unit(s) BID         PPX  1. DVT: heparin gtt   2. VAP: HOB 30 degrees, chlorhexidine rinse  3. Stress Ulcer: PPI  4. Restraints: Nonviolent soft two point restraints required and necessary for patient safety and continued cares and good effect as patient continues to pull at necessary lines, tubes despite education and distraction. Will readdress daily.   5. Wound care - per unit routine   6. Feeding - TF  7. Will update family     Dispo: ICU      BP (!) 167/55   Pulse (!) 151   Temp 98.1  F (36.7  C) (Oral)   Resp (!) 31   Ht 1.702 m (5' 7\")   Wt 103.9 kg (229 lb 0.9 oz)   LMP  (LMP Unknown)   SpO2 97%   BMI 35.88 kg/m      FiO2 (%): 30 % (14/6), Resp: (!) 31      Intake/Output Summary (Last 24 hours) at 1/28/2025 1019  Last data filed at 1/28/2025 0800  Gross per 24 hour   Intake 2101.3 ml   Output 3500 ml   Net -1398.7 ml         Physical Exam:  Gen: Laying in bed in NAD  HEENT: NC AT  Resp: On BiPAP, B/L air entry, no wheezing or rhonchi  CVS: Tachycardic, Afib on monitor  Abd: Soft NT ND  Ext: no swelling noted, s/p left BKA  Neuro: awake, slightly confused, but following commands    Labs: reviewed    Imaging: reviewed    Past Medical/Surgical history     Family history     Social history     Time spent on patient: 40 minutes. This does not include additional procedures    Discussed with staff, Dr. Sudhir Moreland  Surgical Critical Care Fellow  "

## 2025-01-28 NOTE — PROGRESS NOTES
Renal Medicine Progress Note            Assessment/Plan:     75 y.o woman with ESRD, admitted for respiratory distress due to influenza A infection.      # ESRD:               -TTS               -RAVF               -Dr. Basilio               -[facility]                 # Influenza A:               -finished Tamiflu     # FEN: trace edema at the thigh. Electrolytes are acceptable.      # Anemia: Hgb is below target but acceptable.                -epo with HD     Plan:  # Plan for 3 hrs HD with UF ~ 2-3 liters net as tolerates today. Order place.        Interval History:     AFebrile.  BP is okay.  Tachy-afib with RVR  Unable to get off BiPAP.   Pt is sleeping         Medications and Allergies:     Current Facility-Administered Medications   Medication Dose Route Frequency Provider Last Rate Last Admin    - MEDICATION INSTRUCTIONS for Dialysis Patients -   Does not apply See Admin Instructions Cheo Watt MD        amiodarone (PACERONE) tablet 200 mg  200 mg Oral or FT or NG tube Daily Cheo Watt MD   200 mg at 01/28/25 0835    apixaban ANTICOAGULANT (ELIQUIS) tablet 5 mg  5 mg Oral BID Truman Moreland MD   5 mg at 01/28/25 0835    atorvastatin (LIPITOR) tablet 20 mg  20 mg Oral or Feeding Tube QPM Cheo Watt MD   20 mg at 01/27/25 2035    B and C vitamin Complex with folic acid (NEPHRONEX) liquid 5 mL  5 mL Per Feeding Tube Daily Cheo Watt MD   5 mL at 01/28/25 0839    budesonide (PULMICORT) neb solution 1 mg  1 mg Nebulization BID Truman Moreland MD   1 mg at 01/28/25 0818    cetirizine (zyrTEC) tablet 5 mg  5 mg Oral or Feeding Tube At Bedtime Cheo Watt MD   5 mg at 01/27/25 2118    ipratropium - albuterol 0.5 mg/2.5 mg/3 mL (DUONEB) neb solution 3 mL  3 mL Nebulization 4x daily Cheo Watt MD   3 mL at 01/28/25 0818    miconazole (MICATIN) 2 % powder   Topical BID Truman Moreland MD   Given at 01/28/25 0836    pantoprazole (PROTONIX) 2 mg/mL suspension 40 mg  40 mg Per Feeding Tube QAM  "AC Cheo Watt MD   40 mg at 01/28/25 0835    Or    pantoprazole (PROTONIX) IV push injection 40 mg  40 mg Intravenous QAM AC Cheo Watt MD        QUEtiapine (SEROquel) half-tab 12.5 mg  12.5 mg Oral Once Truman Moreland MD        [START ON 1/29/2025] QUEtiapine (SEROquel) tablet 25 mg  25 mg Oral QAM Truman Moreland MD        QUEtiapine (SEROquel) tablet 50 mg  50 mg Oral At Bedtime Truman Moreland MD        sertraline (ZOLOFT) tablet 75 mg  75 mg Oral or Feeding Tube QPM Cheo Watt MD   75 mg at 01/27/25 2118    sevelamer carbonate (RENVELA) tablet 800 mg  800 mg Oral or Feeding Tube TID w/meals Cheo Watt MD   800 mg at 01/28/25 0835    tacrolimus (PROTOPIC) 0.1 % ointment   Topical BID Cheo Watt MD   Given at 01/28/25 0836    vitamin D3 (CHOLECALCIFEROL) tablet 50 mcg  50 mcg Oral or Feeding Tube Daily Cheo Watt MD   50 mcg at 01/28/25 0835        Allergies   Allergen Reactions    Ampicillin-Sulbactam Sodium Rash     No evidence SJS, but very uncomfortable and precipitated multiple provider visits. Would not use penicillins again if other options available.     Penicillins Rash            Physical Exam:   Vitals were reviewed   , Blood pressure (!) 154/62, pulse (!) 163, temperature 98.1  F (36.7  C), temperature source Oral, resp. rate 24, height 1.702 m (5' 7\"), weight 103.9 kg (229 lb 0.9 oz), SpO2 98%, not currently breastfeeding.    Wt Readings from Last 3 Encounters:   01/28/25 103.9 kg (229 lb 0.9 oz)   06/14/24 101.1 kg (222 lb 14.2 oz)   06/03/24 101.1 kg (222 lb 14.2 oz)       Intake/Output Summary (Last 24 hours) at 1/28/2025 1049  Last data filed at 1/28/2025 0800  Gross per 24 hour   Intake 2101.3 ml   Output 3500 ml   Net -1398.7 ml       GENERAL APPEARANCE: NAD.  HEENT: BIPAP  RESP: No wheezes  CV: irregular, irregular.  ABDOMEN: obese, soft. NT  EXTREMITIES/SKIN: trace edema           Data:     CBC RESULTS:     Recent Labs   Lab 01/27/25  0804 01/26/25  0409 " 01/25/25  0412 01/24/25  0413 01/23/25  0539 01/22/25  0556   WBC 10.5 4.3 3.9* 6.2 6.3 7.5   RBC 2.84* 2.60* 2.48* 2.39* 2.48* 2.52*   HGB 8.9* 8.2* 7.9* 7.7* 8.0* 7.9*   HCT 29.2* 25.9* 25.0* 24.2* 24.9* 25.3*    178 167 171 193 199       Basic Metabolic Panel:  Recent Labs   Lab 01/28/25  0953 01/28/25  0831 01/28/25  0623 01/28/25  0407 01/28/25  0152 01/27/25  0601 01/27/25  0421 01/26/25  1227 01/26/25  1135 01/25/25  0621 01/25/25  0412 01/24/25  0554 01/24/25  0413 01/23/25  0743 01/23/25  0539   NA  --   --   --  138  --   --  139  --  134*  --  136  --  136  --  134*   POTASSIUM  --   --   --  4.4  --   --  4.7  --  4.5  --  5.1  --  4.0  --  3.9   CHLORIDE  --   --   --  97*  --   --  98  --  95*  --  98  --  97*  --  94*   CO2  --   --   --  28  --   --  26  --  26  --  24  --  26  --  25   BUN  --   --   --  56.9*  --   --  77.7*  --  54.7*  --  64.5*  --  41.3*  --  74.5*   CR  --   --   --  2.54*  --   --  3.29*  --  2.55*  --  3.60*  --  2.66*  --  4.19*   * 80 162* 124*  139* 118*   < > 105*   < > 167*   < > 124*   < > 122*   < > 138*   JODY  --   --   --  10.6*  --   --  11.2*  --  10.5*  --  10.3  --  9.8  --  10.1    < > = values in this interval not displayed.       INRNo lab results found in last 7 days.   Attestation:   I have reviewed today's relevant vital signs, notes, medications, labs and imaging.    Tian Lim MD  [facility] - Nephrology  Office phone :[phone]  Pager: [phone]

## 2025-01-28 NOTE — PROGRESS NOTES
DIALYSIS PROCEDURE NOTE      Patient dialyzed for 3 hrs. on a K3 bath with a net fluid removal of  3L.  A BFR of 350 ml/min was obtained via a AVF using 15 gauge needles.      The treatment plan was discussed with Dr. Lim during the treatment.    Total heparin received during the treatment: 1 units.   Needle cannulation sites held x 10 min.     Meds  given: none   Complications: none      Person educated: patient. Barriers to learning: confusion/bending of arm. Educated on procedure via verbal mode. Patient unable to verbalize understanding.   ICEBOAT    I: Patient was identified using 2 identifiers  C:  Consent Signed Yes  E: Equipment preventative maintenance is current and dialysis delivery system OK to use  B: Hepatitis B Surface result    Latest Reference Range & Units 01/08/25 11:51 01/09/25 01:51   Hep B Surface Agn Nonreactive  Nonreactive    Hepatitis B Surface Antibody Instrument Value <8.5 m[IU]/mL  <3.50   Hepatitis B Surface Antibody   Nonreactive     O: Dialysis orders present and complete prior to treatment  A: Vascular access verified and assessed prior to treatment  T: Treatment was performed at a clinically appropriate time  ?: Patient was allowed to ask questions and address concerns prior to treatment  Machine water alarm in place and functioning. Transducer pods intact and checked every 15min.   Pt returned via bedside.  Chlorine/Chloramine water system checked every 4 hours.  Outpatient Dialysis at [facility]    Patient repositioned every 2 hours during the treatment.  Post treatment report given     Please remove patient dressing on AVF and AVG needle sites 24 hours after dialysis. If leaking occurs please apply a Band-Aid.

## 2025-01-29 ENCOUNTER — MEDICAL CORRESPONDENCE (OUTPATIENT)
Dept: HEALTH INFORMATION MANAGEMENT | Facility: CLINIC | Age: 75
End: 2025-01-29

## 2025-01-29 ENCOUNTER — APPOINTMENT (OUTPATIENT)
Dept: SPEECH THERAPY | Facility: CLINIC | Age: 75
DRG: 207 | End: 2025-01-29
Attending: HOSPITALIST
Payer: COMMERCIAL

## 2025-01-29 LAB
ANION GAP SERPL CALCULATED.3IONS-SCNC: 11 MMOL/L (ref 7–15)
BASE EXCESS BLDV CALC-SCNC: 7.7 MMOL/L (ref -3–3)
BUN SERPL-MCNC: 46.3 MG/DL (ref 8–23)
CALCIUM SERPL-MCNC: 10.2 MG/DL (ref 8.8–10.4)
CHLORIDE SERPL-SCNC: 99 MMOL/L (ref 98–107)
CREAT SERPL-MCNC: 2.48 MG/DL (ref 0.51–0.95)
EGFRCR SERPLBLD CKD-EPI 2021: 20 ML/MIN/1.73M2
ERYTHROCYTE [DISTWIDTH] IN BLOOD BY AUTOMATED COUNT: 18.6 % (ref 10–15)
FRAGMENTS BLD QL SMEAR: ABNORMAL
GLUCOSE BLDC GLUCOMTR-MCNC: 141 MG/DL (ref 70–99)
GLUCOSE BLDC GLUCOMTR-MCNC: 142 MG/DL (ref 70–99)
GLUCOSE BLDC GLUCOMTR-MCNC: 154 MG/DL (ref 70–99)
GLUCOSE BLDC GLUCOMTR-MCNC: 159 MG/DL (ref 70–99)
GLUCOSE BLDC GLUCOMTR-MCNC: 176 MG/DL (ref 70–99)
GLUCOSE BLDC GLUCOMTR-MCNC: 236 MG/DL (ref 70–99)
GLUCOSE BLDC GLUCOMTR-MCNC: 237 MG/DL (ref 70–99)
GLUCOSE SERPL-MCNC: 157 MG/DL (ref 70–99)
HCO3 BLDV-SCNC: 32 MMOL/L (ref 21–28)
HCO3 SERPL-SCNC: 30 MMOL/L (ref 22–29)
HCT VFR BLD AUTO: 24.2 % (ref 35–47)
HGB BLD-MCNC: 7.5 G/DL (ref 11.7–15.7)
HGB BLD-MCNC: 8 G/DL (ref 11.7–15.7)
MAGNESIUM SERPL-MCNC: 2 MG/DL (ref 1.7–2.3)
MCH RBC QN AUTO: 31.8 PG (ref 26.5–33)
MCHC RBC AUTO-ENTMCNC: 31 G/DL (ref 31.5–36.5)
MCV RBC AUTO: 103 FL (ref 78–100)
O2/TOTAL GAS SETTING VFR VENT: 35 %
OXYHGB MFR BLDV: 73 % (ref 70–75)
PCO2 BLDV: 46 MM HG (ref 40–50)
PH BLDV: 7.46 [PH] (ref 7.32–7.43)
PLAT MORPH BLD: ABNORMAL
PLATELET # BLD AUTO: 169 10E3/UL (ref 150–450)
PO2 BLDV: 40 MM HG (ref 25–47)
POTASSIUM SERPL-SCNC: 4.1 MMOL/L (ref 3.4–5.3)
RBC # BLD AUTO: 2.36 10E6/UL (ref 3.8–5.2)
RBC MORPH BLD: ABNORMAL
SAO2 % BLDV: 74.4 % (ref 70–75)
SODIUM SERPL-SCNC: 140 MMOL/L (ref 135–145)
WBC # BLD AUTO: 4.8 10E3/UL (ref 4–11)

## 2025-01-29 PROCEDURE — 99232 SBSQ HOSP IP/OBS MODERATE 35: CPT | Mod: GC | Performed by: INTERNAL MEDICINE

## 2025-01-29 PROCEDURE — 99418 PROLNG IP/OBS E/M EA 15 MIN: CPT | Performed by: HOSPITALIST

## 2025-01-29 PROCEDURE — 210N000001 HC R&B IMCU HEART CARE

## 2025-01-29 PROCEDURE — 92526 ORAL FUNCTION THERAPY: CPT | Mod: GN | Performed by: SPEECH-LANGUAGE PATHOLOGIST

## 2025-01-29 PROCEDURE — 999N000157 HC STATISTIC RCP TIME EA 10 MIN

## 2025-01-29 PROCEDURE — 83735 ASSAY OF MAGNESIUM: CPT | Performed by: INTERNAL MEDICINE

## 2025-01-29 PROCEDURE — 80048 BASIC METABOLIC PNL TOTAL CA: CPT | Performed by: INTERNAL MEDICINE

## 2025-01-29 PROCEDURE — 82310 ASSAY OF CALCIUM: CPT | Performed by: INTERNAL MEDICINE

## 2025-01-29 PROCEDURE — 250N000009 HC RX 250: Performed by: INTERNAL MEDICINE

## 2025-01-29 PROCEDURE — 258N000003 HC RX IP 258 OP 636: Performed by: HOSPITALIST

## 2025-01-29 PROCEDURE — 250N000013 HC RX MED GY IP 250 OP 250 PS 637: Performed by: STUDENT IN AN ORGANIZED HEALTH CARE EDUCATION/TRAINING PROGRAM

## 2025-01-29 PROCEDURE — 94640 AIRWAY INHALATION TREATMENT: CPT

## 2025-01-29 PROCEDURE — 82805 BLOOD GASES W/O2 SATURATION: CPT | Performed by: INTERNAL MEDICINE

## 2025-01-29 PROCEDURE — 250N000013 HC RX MED GY IP 250 OP 250 PS 637: Performed by: HOSPITALIST

## 2025-01-29 PROCEDURE — 99233 SBSQ HOSP IP/OBS HIGH 50: CPT | Performed by: HOSPITALIST

## 2025-01-29 PROCEDURE — 250N000009 HC RX 250: Performed by: HOSPITALIST

## 2025-01-29 PROCEDURE — 85027 COMPLETE CBC AUTOMATED: CPT | Performed by: STUDENT IN AN ORGANIZED HEALTH CARE EDUCATION/TRAINING PROGRAM

## 2025-01-29 PROCEDURE — 99232 SBSQ HOSP IP/OBS MODERATE 35: CPT | Performed by: INTERNAL MEDICINE

## 2025-01-29 PROCEDURE — 94640 AIRWAY INHALATION TREATMENT: CPT | Mod: 76

## 2025-01-29 PROCEDURE — 82565 ASSAY OF CREATININE: CPT | Performed by: INTERNAL MEDICINE

## 2025-01-29 PROCEDURE — 94660 CPAP INITIATION&MGMT: CPT

## 2025-01-29 PROCEDURE — 250N000009 HC RX 250: Performed by: STUDENT IN AN ORGANIZED HEALTH CARE EDUCATION/TRAINING PROGRAM

## 2025-01-29 PROCEDURE — 85018 HEMOGLOBIN: CPT | Performed by: STUDENT IN AN ORGANIZED HEALTH CARE EDUCATION/TRAINING PROGRAM

## 2025-01-29 PROCEDURE — 250N000013 HC RX MED GY IP 250 OP 250 PS 637: Performed by: INTERNAL MEDICINE

## 2025-01-29 RX ORDER — LIDOCAINE 40 MG/G
CREAM TOPICAL
Status: DISCONTINUED | OUTPATIENT
Start: 2025-01-29 | End: 2025-02-26 | Stop reason: HOSPADM

## 2025-01-29 RX ORDER — CARBOXYMETHYLCELLULOSE SODIUM 5 MG/ML
1 SOLUTION/ DROPS OPHTHALMIC
Status: DISCONTINUED | OUTPATIENT
Start: 2025-01-29 | End: 2025-02-26 | Stop reason: HOSPADM

## 2025-01-29 RX ADMIN — MICONAZOLE NITRATE: 2 POWDER TOPICAL at 08:42

## 2025-01-29 RX ADMIN — QUETIAPINE FUMARATE 50 MG: 50 TABLET ORAL at 20:28

## 2025-01-29 RX ADMIN — MICONAZOLE NITRATE: 2 POWDER TOPICAL at 20:28

## 2025-01-29 RX ADMIN — IPRATROPIUM BROMIDE AND ALBUTEROL SULFATE 3 ML: .5; 3 SOLUTION RESPIRATORY (INHALATION) at 15:31

## 2025-01-29 RX ADMIN — INSULIN GLARGINE 15 UNITS: 100 INJECTION, SOLUTION SUBCUTANEOUS at 09:05

## 2025-01-29 RX ADMIN — Medication 40 MG: at 08:42

## 2025-01-29 RX ADMIN — IPRATROPIUM BROMIDE AND ALBUTEROL SULFATE 3 ML: .5; 3 SOLUTION RESPIRATORY (INHALATION) at 11:45

## 2025-01-29 RX ADMIN — SERTRALINE HYDROCHLORIDE 75 MG: 50 TABLET ORAL at 20:29

## 2025-01-29 RX ADMIN — TACROLIMUS: 1 OINTMENT TOPICAL at 08:42

## 2025-01-29 RX ADMIN — CETIRIZINE HYDROCHLORIDE 5 MG: 5 TABLET ORAL at 20:29

## 2025-01-29 RX ADMIN — TACROLIMUS: 1 OINTMENT TOPICAL at 20:26

## 2025-01-29 RX ADMIN — IPRATROPIUM BROMIDE AND ALBUTEROL SULFATE 3 ML: .5; 3 SOLUTION RESPIRATORY (INHALATION) at 07:19

## 2025-01-29 RX ADMIN — Medication 5 ML: at 08:42

## 2025-01-29 RX ADMIN — Medication 50 MCG: at 08:42

## 2025-01-29 RX ADMIN — IPRATROPIUM BROMIDE AND ALBUTEROL SULFATE 3 ML: .5; 3 SOLUTION RESPIRATORY (INHALATION) at 19:32

## 2025-01-29 RX ADMIN — INSULIN GLARGINE 15 UNITS: 100 INJECTION, SOLUTION SUBCUTANEOUS at 20:27

## 2025-01-29 RX ADMIN — APIXABAN 5 MG: 5 TABLET, FILM COATED ORAL at 08:42

## 2025-01-29 RX ADMIN — ATORVASTATIN CALCIUM 20 MG: 20 TABLET, FILM COATED ORAL at 20:28

## 2025-01-29 RX ADMIN — DILTIAZEM HYDROCHLORIDE 15 MG/HR: 5 INJECTION, SOLUTION INTRAVENOUS at 18:36

## 2025-01-29 RX ADMIN — AMIODARONE HYDROCHLORIDE 200 MG: 200 TABLET ORAL at 08:42

## 2025-01-29 RX ADMIN — Medication 1 MG: at 20:29

## 2025-01-29 RX ADMIN — QUETIAPINE FUMARATE 25 MG: 25 TABLET ORAL at 08:42

## 2025-01-29 RX ADMIN — APIXABAN 5 MG: 5 TABLET, FILM COATED ORAL at 20:29

## 2025-01-29 RX ADMIN — BUDESONIDE 1 MG: 1 INHALANT ORAL at 07:19

## 2025-01-29 RX ADMIN — SEVELAMER CARBONATE 800 MG: 800 TABLET, FILM COATED ORAL at 12:28

## 2025-01-29 RX ADMIN — SEVELAMER CARBONATE 800 MG: 800 TABLET, FILM COATED ORAL at 08:42

## 2025-01-29 ASSESSMENT — ACTIVITIES OF DAILY LIVING (ADL)
ADLS_ACUITY_SCORE: 90
ADLS_ACUITY_SCORE: 83
ADLS_ACUITY_SCORE: 82
ADLS_ACUITY_SCORE: 90
ADLS_ACUITY_SCORE: 78
ADLS_ACUITY_SCORE: 83
ADLS_ACUITY_SCORE: 78
ADLS_ACUITY_SCORE: 86
ADLS_ACUITY_SCORE: 86
ADLS_ACUITY_SCORE: 83
ADLS_ACUITY_SCORE: 83
ADLS_ACUITY_SCORE: 78
ADLS_ACUITY_SCORE: 90
ADLS_ACUITY_SCORE: 86
ADLS_ACUITY_SCORE: 86
ADLS_ACUITY_SCORE: 79
ADLS_ACUITY_SCORE: 78
ADLS_ACUITY_SCORE: 82
ADLS_ACUITY_SCORE: 84
ADLS_ACUITY_SCORE: 78
ADLS_ACUITY_SCORE: 79
ADLS_ACUITY_SCORE: 84
ADLS_ACUITY_SCORE: 86

## 2025-01-29 NOTE — TELEPHONE ENCOUNTER
I spoke with Marium at [employer]. She is faxing the denial letter to me. I will update encounter when received

## 2025-01-29 NOTE — PLAN OF CARE
Problem: Adult Inpatient Plan of Care  Goal: Plan of Care Review    Outcome: Progressing  Flowsheets (Taken 1/29/2025 1616)  Plan of Care Reviewed With: patient  Overall Patient Progress: improving  Goal: Patient-Specific Goal (Individualized)  Outcome: Progressing  Goal: Absence of Hospital-Acquired Illness or Injury  Outcome: Progressing  Intervention: Identify and Manage Fall Risk  Recent Flowsheet Documentation  Taken 1/29/2025 1200 by Nicole Childress RN  Safety Promotion/Fall Prevention:   activity supervised   clutter free environment maintained   increased rounding and observation   treat reversible contributory factors   safety round/check completed   room near nurse's station   patient and family education   bedside attendant  Taken 1/29/2025 0800 by Nicole Childress RN  Safety Promotion/Fall Prevention:   activity supervised   clutter free environment maintained   increased rounding and observation   treat reversible contributory factors   safety round/check completed   room near nurse's station   patient and family education   bedside attendant  Intervention: Prevent Skin Injury  Recent Flowsheet Documentation  Taken 1/29/2025 1400 by Nicole Childress RN  Body Position: weight shifting  Taken 1/29/2025 1323 by Nicole Childress RN  Body Position: weight shifting  Taken 1/29/2025 1200 by Nicole Childress RN  Skin Protection:   adhesive use limited   silicone foam dressing in place   pulse oximeter probe site changed   incontinence pads utilized   tubing/devices free from skin contact  Taken 1/29/2025 1015 by Nicole Childress RN  Body Position: turned  Taken 1/29/2025 0841 by Nicole Childress RN  Body Position:   turned   heels elevated   legs elevated  Taken 1/29/2025 0800 by Nicole Childress RN  Skin Protection:   adhesive use limited   silicone foam dressing in place   pulse oximeter probe site changed   incontinence pads utilized   tubing/devices free from skin contact  Intervention: Prevent and Manage VTE (Venous  Thromboembolism) Risk  Recent Flowsheet Documentation  Taken 1/29/2025 1200 by Nicole Childress RN  VTE Prevention/Management: SCDs on (sequential compression devices)  Taken 1/29/2025 0800 by Nicole Childress RN  VTE Prevention/Management: SCDs on (sequential compression devices)  Intervention: Prevent Infection  Recent Flowsheet Documentation  Taken 1/29/2025 1200 by Nicole Childress RN  Infection Prevention:   rest/sleep promoted   hand hygiene promoted  Taken 1/29/2025 0800 by Nicole Childress RN  Infection Prevention:   rest/sleep promoted   hand hygiene promoted  Goal: Optimal Comfort and Wellbeing  Outcome: Progressing  Intervention: Provide Person-Centered Care  Recent Flowsheet Documentation  Taken 1/29/2025 1200 by Nicole Childress RN  Trust Relationship/Rapport:   care explained   choices provided   emotional support provided   reassurance provided   thoughts/feelings acknowledged  Taken 1/29/2025 0800 by Nicole Childress RN  Trust Relationship/Rapport:   care explained   choices provided   emotional support provided   reassurance provided   thoughts/feelings acknowledged  Goal: Readiness for Transition of Care  Outcome: Progressing   Goal Outcome Evaluation:      Plan of Care Reviewed With: patient    Overall Patient Progress: improvingOverall Patient Progress: improving    Intermittently confused. Alert. Follows commands. Tele Afib w/ CVR/ RVR. Diltiazem gtt infusing @ 15. HFNC 35%, 35 LPM. Tolerating full liquids, mildly thick, poor appetite. TF at goal rate of 55. Anuric. BM x1. Up to chair w/ lift. Denies pain. Daughter updated via telephone. Plan for HD tomorrow. Handoff given, pt transferred to CCU.

## 2025-01-29 NOTE — PLAN OF CARE
"Neuro: oriented x3 (confused to situation), PERRLA, opens eyes spontaneously, follows commands, GELLER, anxious at times - seroquel dose increased     CV: tele a-fib, diltiazem drip started - see MAR     Resp: LS diminished, off bipap for 30 min this AM and did not tolerate, placed back on bipap until 1345, back on BiPAP at 18:45     GI: BS audible, BM this shift, tolerating diet, Video swallow cancelled due to increased work of breathing and HR, TF changed to [place] - able to increase to 35 ml/hr at midnight     : anuric, HD run today removed 3L     Skin: redness/rash to skin folds, right heel cracked     Activity: assist of 2 + lift, up to chair since 17:00     Additional: afebrile, sister and daughter updated at bedside, plan to stay on bipap overnight    ---------------------------------------------------    Goal Outcome Evaluation:    Problem: Adult Inpatient Plan of Care  Goal: Plan of Care Review  Description: The Plan of Care Review/Shift note should be completed every shift.  The Outcome Evaluation is a brief statement about your assessment that the patient is improving, declining, or no change.  This information will be displayed automatically on your shift  note.  Outcome: Progressing  Goal: Patient-Specific Goal (Individualized)  Description: You can add care plan individualizations to a care plan. Examples of Individualization might be:  \"Parent requests to be called daily at 9am for status\", \"I have a hard time hearing out of my right ear\", or \"Do not touch me to wake me up as it startles  me\".  Outcome: Progressing  Goal: Absence of Hospital-Acquired Illness or Injury  Outcome: Progressing  Intervention: Identify and Manage Fall Risk  Recent Flowsheet Documentation  Taken 1/28/2025 1600 by Karlene Esquivel, RN  Safety Promotion/Fall Prevention:   activity supervised   clutter free environment maintained   increased rounding and observation   treat reversible contributory factors   safety " round/check completed   room near nurse's station   patient and family education   assistive device/personal items within reach   increase visualization of patient   lighting adjusted   mobility aid in reach   nonskid shoes/slippers when out of bed   room door open   room organization consistent   supervised activity   treat underlying cause  Taken 1/28/2025 1200 by Karlene Esquivel RN  Safety Promotion/Fall Prevention:   activity supervised   clutter free environment maintained   increased rounding and observation   treat reversible contributory factors   safety round/check completed   room near nurse's station   patient and family education   assistive device/personal items within reach   increase visualization of patient   lighting adjusted   mobility aid in reach   nonskid shoes/slippers when out of bed   room door open   room organization consistent   supervised activity   treat underlying cause  Taken 1/28/2025 0800 by Karlene Esquivel RN  Safety Promotion/Fall Prevention:   activity supervised   clutter free environment maintained   increased rounding and observation   treat reversible contributory factors   safety round/check completed   room near nurse's station   patient and family education   assistive device/personal items within reach   increase visualization of patient   lighting adjusted   mobility aid in reach   nonskid shoes/slippers when out of bed   room door open   room organization consistent   supervised activity   treat underlying cause  Intervention: Prevent Skin Injury  Recent Flowsheet Documentation  Taken 1/28/2025 1800 by Karlene Esquivel RN  Body Position: weight shifting  Taken 1/28/2025 1600 by Karlene Esquivel RN  Body Position:   turned   left   heels elevated  Skin Protection:   adhesive use limited   silicone foam dressing in place   pulse oximeter probe site changed   incontinence pads utilized   tubing/devices free from skin contact  Taken 1/28/2025 1400 by Alvin  Karlene DAIGLE RN  Body Position:   turned   right   heels elevated  Taken 1/28/2025 1245 by Karlene Esquivel RN  Body Position:   turned   left   heels elevated  Taken 1/28/2025 1200 by Karlene Esquivel RN  Body Position:   position maintained   right  Skin Protection:   adhesive use limited   silicone foam dressing in place   pulse oximeter probe site changed   incontinence pads utilized   tubing/devices free from skin contact  Taken 1/28/2025 1100 by Karlene Esquivel RN  Body Position:   turned   right   heels elevated  Taken 1/28/2025 0900 by Karlene Esquivel RN  Body Position:   turned   left   heels elevated  Taken 1/28/2025 0800 by Karlene Esquivel RN  Body Position:   turned   right   heels elevated  Skin Protection:   adhesive use limited   silicone foam dressing in place   pulse oximeter probe site changed   incontinence pads utilized   tubing/devices free from skin contact  Intervention: Prevent and Manage VTE (Venous Thromboembolism) Risk  Recent Flowsheet Documentation  Taken 1/28/2025 1600 by Karlene Esquivel RN  VTE Prevention/Management: SCDs on (sequential compression devices)  Taken 1/28/2025 1200 by Karlene Esquivel RN  VTE Prevention/Management: SCDs on (sequential compression devices)  Taken 1/28/2025 0800 by Karlene Esquivel RN  VTE Prevention/Management: SCDs on (sequential compression devices)  Intervention: Prevent Infection  Recent Flowsheet Documentation  Taken 1/28/2025 1600 by Karlene Esquivel RN  Infection Prevention:   cohorting utilized   environmental surveillance performed   equipment surfaces disinfected   hand hygiene promoted   personal protective equipment utilized   rest/sleep promoted   single patient room provided  Taken 1/28/2025 1200 by Karlene Esquivel RN  Infection Prevention:   cohorting utilized   environmental surveillance performed   equipment surfaces disinfected   hand hygiene promoted   personal protective equipment utilized   rest/sleep promoted    single patient room provided  Taken 1/28/2025 0800 by Karlene Esquivel RN  Infection Prevention:   cohorting utilized   environmental surveillance performed   equipment surfaces disinfected   hand hygiene promoted   personal protective equipment utilized   rest/sleep promoted   single patient room provided  Goal: Optimal Comfort and Wellbeing  Outcome: Progressing  Intervention: Monitor Pain and Promote Comfort  Recent Flowsheet Documentation  Taken 1/28/2025 1200 by Karlene Esquivel RN  Pain Management Interventions:   repositioned   rest  Taken 1/28/2025 0800 by Karlene Esquivel RN  Pain Management Interventions:   repositioned   rest  Intervention: Provide Person-Centered Care  Recent Flowsheet Documentation  Taken 1/28/2025 1600 by Karlene Esquivel RN  Trust Relationship/Rapport:   care explained   choices provided   emotional support provided   reassurance provided   thoughts/feelings acknowledged   empathic listening provided   questions answered   questions encouraged  Taken 1/28/2025 1200 by Karlene Esquivel RN  Trust Relationship/Rapport:   care explained   choices provided   emotional support provided   reassurance provided   thoughts/feelings acknowledged   empathic listening provided   questions answered   questions encouraged  Taken 1/28/2025 0800 by Karlene Esquivel RN  Trust Relationship/Rapport:   care explained   choices provided   emotional support provided   reassurance provided   thoughts/feelings acknowledged   empathic listening provided   questions answered   questions encouraged  Goal: Readiness for Transition of Care  Outcome: Progressing     Problem: Fall Injury Risk  Goal: Absence of Fall and Fall-Related Injury  Outcome: Progressing  Intervention: Identify and Manage Contributors  Recent Flowsheet Documentation  Taken 1/28/2025 1600 by Karlene Esquivel RN  Self-Care Promotion:   independence encouraged   meal set-up provided  Medication Review/Management:   medications  reviewed   high-risk medications identified  Taken 1/28/2025 1200 by Karlene Esquivel RN  Self-Care Promotion:   independence encouraged   meal set-up provided  Medication Review/Management:   medications reviewed   high-risk medications identified  Taken 1/28/2025 0800 by Karlene Esquivel RN  Self-Care Promotion:   independence encouraged   meal set-up provided  Medication Review/Management:   medications reviewed   high-risk medications identified  Intervention: Promote Injury-Free Environment  Recent Flowsheet Documentation  Taken 1/28/2025 1600 by Karlene Esquivel RN  Safety Promotion/Fall Prevention:   activity supervised   clutter free environment maintained   increased rounding and observation   treat reversible contributory factors   safety round/check completed   room near nurse's station   patient and family education   assistive device/personal items within reach   increase visualization of patient   lighting adjusted   mobility aid in reach   nonskid shoes/slippers when out of bed   room door open   room organization consistent   supervised activity   treat underlying cause  Taken 1/28/2025 1200 by Karlene Esquivel RN  Safety Promotion/Fall Prevention:   activity supervised   clutter free environment maintained   increased rounding and observation   treat reversible contributory factors   safety round/check completed   room near nurse's station   patient and family education   assistive device/personal items within reach   increase visualization of patient   lighting adjusted   mobility aid in reach   nonskid shoes/slippers when out of bed   room door open   room organization consistent   supervised activity   treat underlying cause  Taken 1/28/2025 0800 by Karlene Esquivel RN  Safety Promotion/Fall Prevention:   activity supervised   clutter free environment maintained   increased rounding and observation   treat reversible contributory factors   safety round/check completed   room near  nurse's station   patient and family education   assistive device/personal items within reach   increase visualization of patient   lighting adjusted   mobility aid in reach   nonskid shoes/slippers when out of bed   room door open   room organization consistent   supervised activity   treat underlying cause     Problem: Pain Acute  Goal: Optimal Pain Control and Function  Outcome: Progressing  Intervention: Optimize Psychosocial Wellbeing  Recent Flowsheet Documentation  Taken 1/28/2025 1600 by Karlene Esquivel RN  Supportive Measures:   active listening utilized   problem-solving facilitated   relaxation techniques promoted   self-care encouraged   decision-making supported   goal-setting facilitated   positive reinforcement provided   verbalization of feelings encouraged  Taken 1/28/2025 1200 by Karlene Esquivel RN  Supportive Measures:   active listening utilized   problem-solving facilitated   relaxation techniques promoted   self-care encouraged   decision-making supported   goal-setting facilitated   positive reinforcement provided   verbalization of feelings encouraged  Taken 1/28/2025 0800 by Karlene Esquivel RN  Supportive Measures:   active listening utilized   problem-solving facilitated   relaxation techniques promoted   self-care encouraged   decision-making supported   goal-setting facilitated   positive reinforcement provided   verbalization of feelings encouraged  Intervention: Develop Pain Management Plan  Recent Flowsheet Documentation  Taken 1/28/2025 1200 by Karlene Esquivel RN  Pain Management Interventions:   repositioned   rest  Taken 1/28/2025 0800 by Karlene Esquivel RN  Pain Management Interventions:   repositioned   rest  Intervention: Prevent or Manage Pain  Recent Flowsheet Documentation  Taken 1/28/2025 1600 by Karlene Esquivel RN  Sensory Stimulation Regulation:   care clustered   television on  Bowel Elimination Promotion: adequate fluid intake promoted  Medication  Review/Management:   medications reviewed   high-risk medications identified  Taken 1/28/2025 1200 by Karlene Esquivel RN  Sensory Stimulation Regulation:   care clustered   television on  Bowel Elimination Promotion: adequate fluid intake promoted  Medication Review/Management:   medications reviewed   high-risk medications identified  Taken 1/28/2025 0800 by Karlene Esquivel RN  Sensory Stimulation Regulation:   care clustered   television on  Bowel Elimination Promotion: adequate fluid intake promoted  Medication Review/Management:   medications reviewed   high-risk medications identified     Problem: Gas Exchange Impaired  Goal: Optimal Gas Exchange  Outcome: Progressing  Intervention: Optimize Oxygenation and Ventilation  Recent Flowsheet Documentation  Taken 1/28/2025 1600 by Karlene Esquivel RN  Airway/Ventilation Management:   airway patency maintained   humidification applied   oxygen therapy provided   pulmonary hygiene promoted   calming measures promoted  Head of Bed (HOB) Positioning: HOB at 30 degrees  Taken 1/28/2025 1400 by Karlene Esquivel RN  Head of Bed (HOB) Positioning: HOB at 30 degrees  Taken 1/28/2025 1245 by Karlene Esquivel RN  Head of Bed (HOB) Positioning: HOB at 30 degrees  Taken 1/28/2025 1200 by Karlene Esquivel RN  Airway/Ventilation Management:   airway patency maintained   humidification applied   oxygen therapy provided   pulmonary hygiene promoted   calming measures promoted  Head of Bed (HOB) Positioning: HOB at 30 degrees  Taken 1/28/2025 1100 by Karlene Esquivel RN  Head of Bed (HOB) Positioning: HOB at 30 degrees  Taken 1/28/2025 0900 by Karlene Esquivel RN  Head of Bed (HOB) Positioning: HOB at 30-45 degrees  Taken 1/28/2025 0800 by Karlene Esquivel RN  Airway/Ventilation Management:   airway patency maintained   humidification applied   oxygen therapy provided   pulmonary hygiene promoted   calming measures promoted  Head of Bed (HOB) Positioning: HOB at  30-45 degrees     Problem: Risk for Delirium  Goal: Optimal Coping  Outcome: Progressing  Intervention: Optimize Psychosocial Adjustment to Delirium  Recent Flowsheet Documentation  Taken 1/28/2025 1600 by Karlene Esquivel RN  Supportive Measures:   active listening utilized   problem-solving facilitated   relaxation techniques promoted   self-care encouraged   decision-making supported   goal-setting facilitated   positive reinforcement provided   verbalization of feelings encouraged  Family/Support System Care:   involvement promoted   presence promoted   self-care encouraged   support provided  Taken 1/28/2025 1200 by Karlene Esquivel RN  Supportive Measures:   active listening utilized   problem-solving facilitated   relaxation techniques promoted   self-care encouraged   decision-making supported   goal-setting facilitated   positive reinforcement provided   verbalization of feelings encouraged  Family/Support System Care:   involvement promoted   presence promoted   self-care encouraged   support provided  Taken 1/28/2025 0800 by Karlene Esquivel RN  Supportive Measures:   active listening utilized   problem-solving facilitated   relaxation techniques promoted   self-care encouraged   decision-making supported   goal-setting facilitated   positive reinforcement provided   verbalization of feelings encouraged  Family/Support System Care:   involvement promoted   presence promoted   self-care encouraged   support provided  Goal: Improved Behavioral Control  Outcome: Progressing  Intervention: Prevent and Manage Agitation  Recent Flowsheet Documentation  Taken 1/28/2025 1600 by Karlene Esquivel RN  Environment Familiarity/Consistency: daily routine followed  Taken 1/28/2025 1200 by Karlene Esquivel RN  Environment Familiarity/Consistency: daily routine followed  Taken 1/28/2025 0800 by Karlene Esquivel RN  Environment Familiarity/Consistency: daily routine followed  Intervention: Minimize Safety  Risk  Recent Flowsheet Documentation  Taken 1/28/2025 1600 by Karlene Esquivel RN  Enhanced Safety Measures:   room near unit station   assistive devices when indicated   monitor patients coagulation values   pain management   patient/family teach back on injury risk   review medications for side effects with activity  Trust Relationship/Rapport:   care explained   choices provided   emotional support provided   reassurance provided   thoughts/feelings acknowledged   empathic listening provided   questions answered   questions encouraged  Taken 1/28/2025 1200 by Karlene Esquivel RN  Enhanced Safety Measures:   room near unit station   assistive devices when indicated   monitor patients coagulation values   pain management   patient/family teach back on injury risk   review medications for side effects with activity  Trust Relationship/Rapport:   care explained   choices provided   emotional support provided   reassurance provided   thoughts/feelings acknowledged   empathic listening provided   questions answered   questions encouraged  Taken 1/28/2025 0800 by Karlene Esquivel RN  Enhanced Safety Measures:   room near unit station   assistive devices when indicated   monitor patients coagulation values   pain management   patient/family teach back on injury risk   review medications for side effects with activity  Trust Relationship/Rapport:   care explained   choices provided   emotional support provided   reassurance provided   thoughts/feelings acknowledged   empathic listening provided   questions answered   questions encouraged  Goal: Improved Attention and Thought Clarity  Outcome: Progressing  Intervention: Maximize Cognitive Function  Recent Flowsheet Documentation  Taken 1/28/2025 1600 by Karlene Esquivel RN  Sensory Stimulation Regulation:   care clustered   television on  Reorientation Measures:   reorientation provided   clock in view   glasses use encouraged   calendar in view   familiar social  contact encouraged  Taken 1/28/2025 1200 by Karlene Esquivel RN  Sensory Stimulation Regulation:   care clustered   television on  Reorientation Measures:   reorientation provided   clock in view   glasses use encouraged   calendar in view   familiar social contact encouraged  Taken 1/28/2025 0800 by Karlene Esquivel RN  Sensory Stimulation Regulation:   care clustered   television on  Reorientation Measures:   reorientation provided   clock in view   glasses use encouraged   calendar in view   familiar social contact encouraged  Goal: Improved Sleep  Outcome: Progressing

## 2025-01-29 NOTE — PROGRESS NOTES
Renal Medicine Progress Note            Assessment/Plan:     75 y.o woman with ESRD, admitted for respiratory distress due to influenza A infection.      # ESRD:               -TTS               -NICOLEF               -Dr. Basilio               -[facility]                 # Influenza A:               -finished Tamiflu     # FEN: trace edema at the thigh. Electrolytes are acceptable.      # Anemia: Hgb is below target but acceptable.                -epo with HD     Plan:  # HD order placed for tomorrow.          Interval History:     Afebrile. VSS.   She is on HF.   She answers some questions.           Medications and Allergies:     Current Facility-Administered Medications   Medication Dose Route Frequency Provider Last Rate Last Admin    - MEDICATION INSTRUCTIONS for Dialysis Patients -   Does not apply See Admin Instructions Cheo Watt MD        amiodarone (PACERONE) tablet 200 mg  200 mg Oral or FT or NG tube Daily Cheo Watt MD   200 mg at 01/29/25 0842    apixaban ANTICOAGULANT (ELIQUIS) tablet 5 mg  5 mg Oral BID Truman Moreland MD   5 mg at 01/29/25 0842    atorvastatin (LIPITOR) tablet 20 mg  20 mg Oral or Feeding Tube QPM Cheo Watt MD   20 mg at 01/28/25 1951    B and C vitamin Complex with folic acid (NEPHRONEX) liquid 5 mL  5 mL Per Feeding Tube Daily Cheo Watt MD   5 mL at 01/29/25 0842    budesonide (PULMICORT) neb solution 1 mg  1 mg Nebulization BID Truman Moreland MD   1 mg at 01/29/25 0719    cetirizine (zyrTEC) tablet 5 mg  5 mg Oral or Feeding Tube At Bedtime Cheo Watt MD   5 mg at 01/28/25 2100    insulin aspart (NovoLOG) injection (RAPID ACTING)  1-12 Units Subcutaneous Q4H Truman Moreland MD   1 Units at 01/29/25 0753    insulin glargine (LANTUS PEN) injection 15 Units  15 Units Subcutaneous BID Truman Moreland MD   15 Units at 01/29/25 0905    ipratropium - albuterol 0.5 mg/2.5 mg/3 mL (DUONEB) neb solution 3 mL  3 mL Nebulization 4x daily Cheo Watt MD   3 mL  "at 01/29/25 1145    miconazole (MICATIN) 2 % powder   Topical BID Truman Moreland MD   Given at 01/29/25 0842    pantoprazole (PROTONIX) 2 mg/mL suspension 40 mg  40 mg Per Feeding Tube QAM AC Cheo Watt MD   40 mg at 01/29/25 0842    Or    pantoprazole (PROTONIX) IV push injection 40 mg  40 mg Intravenous QAM AC Cheo Watt MD        QUEtiapine (SEROquel) tablet 25 mg  25 mg Oral QAM Truman Moreland MD   25 mg at 01/29/25 0842    QUEtiapine (SEROquel) tablet 50 mg  50 mg Oral At Bedtime Truman Moreland MD   50 mg at 01/28/25 2100    sertraline (ZOLOFT) tablet 75 mg  75 mg Oral or Feeding Tube QPM Cheo Watt MD   75 mg at 01/28/25 1951    sevelamer carbonate (RENVELA) tablet 800 mg  800 mg Oral or Feeding Tube TID w/meals Cheo Watt MD   800 mg at 01/29/25 0842    tacrolimus (PROTOPIC) 0.1 % ointment   Topical BID Cheo Watt MD   Given at 01/29/25 0842    vitamin D3 (CHOLECALCIFEROL) tablet 50 mcg  50 mcg Oral or Feeding Tube Daily Cheo Watt MD   50 mcg at 01/29/25 0842        Allergies   Allergen Reactions    Ampicillin-Sulbactam Sodium Rash     No evidence SJS, but very uncomfortable and precipitated multiple provider visits. Would not use penicillins again if other options available.     Penicillins Rash            Physical Exam:   Vitals were reviewed   , Blood pressure (!) 158/65, pulse 113, temperature 97.5  F (36.4  C), temperature source Oral, resp. rate (!) 47, height 1.702 m (5' 7\"), weight 99.7 kg (219 lb 12.8 oz), SpO2 99%, not currently breastfeeding.    Wt Readings from Last 3 Encounters:   01/29/25 99.7 kg (219 lb 12.8 oz)   06/14/24 101.1 kg (222 lb 14.2 oz)   06/03/24 101.1 kg (222 lb 14.2 oz)       Intake/Output Summary (Last 24 hours) at 1/29/2025 1151  Last data filed at 1/29/2025 0800  Gross per 24 hour   Intake 2030.56 ml   Output 3000 ml   Net -969.44 ml     GENERAL APPEARANCE: NAD.  HEENT: EOMI. On HF  RESP: No wheezes  CV: irregular, irregular.  ABDOMEN: obese, soft. " NT  EXTREMITIES/SKIN: trace edema         Data:     CBC RESULTS:     Recent Labs   Lab 01/29/25  0436 01/27/25  0804 01/26/25  0409 01/25/25  0412 01/24/25  0413 01/23/25  0539   WBC 4.8 10.5 4.3 3.9* 6.2 6.3   RBC 2.36* 2.84* 2.60* 2.48* 2.39* 2.48*   HGB 7.5* 8.9* 8.2* 7.9* 7.7* 8.0*   HCT 24.2* 29.2* 25.9* 25.0* 24.2* 24.9*    306 178 167 171 193       Basic Metabolic Panel:  Recent Labs   Lab 01/29/25  0753 01/29/25  0436 01/29/25  0435 01/29/25  0014 01/28/25  1954 01/28/25  1600 01/28/25  0623 01/28/25  0407 01/27/25  0601 01/27/25  0421 01/26/25  1227 01/26/25  1135 01/25/25  0621 01/25/25  0412 01/24/25  0554 01/24/25  0413   NA  --  140  --   --   --   --   --  138  --  139  --  134*  --  136  --  136   POTASSIUM  --  4.1  --   --   --   --   --  4.4  --  4.7  --  4.5  --  5.1  --  4.0   CHLORIDE  --  99  --   --   --   --   --  97*  --  98  --  95*  --  98  --  97*   CO2  --  30*  --   --   --   --   --  28  --  26  --  26  --  24  --  26   BUN  --  46.3*  --   --   --   --   --  56.9*  --  77.7*  --  54.7*  --  64.5*  --  41.3*   CR  --  2.48*  --   --   --   --   --  2.54*  --  3.29*  --  2.55*  --  3.60*  --  2.66*   * 157* 142* 141* 136* 125*   < > 124*  139*   < > 105*   < > 167*   < > 124*   < > 122*   JODY  --  10.2  --   --   --   --   --  10.6*  --  11.2*  --  10.5*  --  10.3  --  9.8    < > = values in this interval not displayed.       INRNo lab results found in last 7 days.   Attestation:   I have reviewed today's relevant vital signs, notes, medications, labs and imaging.    Tian Lim MD  [facility] - Nephrology  Office phone :[phone]  Pager: [phone]

## 2025-01-29 NOTE — PROGRESS NOTES
ICU Progress Note   Date of Service: 01/29/25    Assessment and Plan:  75 year old female with PMHx significant for ESRD on HD, CHF, COPD, poor Mobility (L AKA, Obesity, WC bound), DM type II, hypertension.  She presented to the ER on 1/8/25 complaining of shortness of breath and was diagnosed with Influenza A. On 1/9/25 she was transferred to the ICU and intubated due to worsening acute on chronic respiratory failure. On the evening of 1/18/25 she was extubated but she was reintubated on 1/20.      Overnight events:   BiPAP overnight, transitioned to HFNC this AM  Remains in Afib, rate controlled with diltiazem gtt     Neuro  Likely baseline cognitive impairment  Anxiety  - Seroquel 25 mg in AM, 50 mg qHS  - PTA Zoloft, gabapentin     Pulmonary  Acute hypoxemic respiratory failure  Influenza A  COPD exacerbation  Hx of JONE  ? Hospital acquired pneumonia  Upper airway swelling/ pharyngeal edema  - Extubated 1/25  - BiPAP PRN  - Completed high dose steroids with dexa 10 mg q6x 48 hours  - Continue nebs  - Restart pulmicort nebs after steroid burst     Cardiac  Shock, likely due to sedation (improved)  Afib now rate controlled  - MAP goal > 65, currently off norepi  - Continue PO amio for RVR, PRN metoprolol  - Diltiazem gtt, may be able to transition to PO  - DOAC for Afib     Renal  ESRD on HD  - Nephrology consult, appreciate recs  - iHD as per schedule     GI  No active issues  - RD to manage TFs  - PTA PPI     Heme/Onc  Chronic anemia  Hx of Afib  - monitor CBC  - Transfuse for Hgb < 7  - DOAC for Afib     ID  Influenza A pneumona  ? Hospital acquired pneumonia  - Tamiflu treatment completed  - Cefepime/Vanc restarted 1/20 after bronch  - BAL cultures with actinomyces, CT chest with RML infiltrate vs atelectasis but no sign of actinomycosis, this is likely colonization.  - given upper airway swelling and hx of PCN allergy, switched from cefepime to levofloxacin, last dose 1/26  - Monitor off antibiotics    "  Endo  Hx of DM  - monitor BG  - Glargine 15 unit(s) BID, sliding scale insulin          PPX  1. DVT: DOAC  2. VAP: HOB 30 degrees, chlorhexidine rinse  3. Stress Ulcer: PTA PPI  4. Restraints: N/A  5. Wound care - per unit routine   6. Feeding - TF  7. Will update family     Dispo: ICU      /45   Pulse 85   Temp (!) 96.4  F (35.8  C) (Axillary)   Resp 27   Ht 1.702 m (5' 7\")   Wt 99.7 kg (219 lb 12.8 oz)   LMP  (LMP Unknown)   SpO2 98%   BMI 34.43 kg/m      FiO2 (%): 30 % (15/6), Resp: 27      Intake/Output Summary (Last 24 hours) at 1/29/2025 1007  Last data filed at 1/29/2025 0800  Gross per 24 hour   Intake 2030.56 ml   Output 3000 ml   Net -969.44 ml         Physical Exam:  Gen: Laying in bed in NAD  HEENT: NC AT  Resp: On BiPAP, B/L air entry, no wheezing or rhonchi  CVS: Tachycardic, Afib on monitor  Abd: Soft NT ND  Ext: no swelling noted, s/p left BKA  Neuro: Drowsy, but following commands    Labs: reviewed    Imaging: reviewed    Past Medical/Surgical history     Family history     Social history     Time spent on patient: 40 minutes. This does not include additional procedures    Discussed with staff, Dr. Sudhir Moreland  Surgical Critical Care Fellow  "

## 2025-01-29 NOTE — PLAN OF CARE
Goal Outcome Evaluation:      Plan of Care Reviewed With: patient, child    Overall Patient Progress: improvingOverall Patient Progress: improving    Outcome Evaluation: Patient remained on BIPAP 30%, 15/6. Patient was able to get uninterrupted sleep from 3630-4798. More redirectable and cooperative tonight. Dilitazem gtt for afib RVR.      Problem: Adult Inpatient Plan of Care  Goal: Plan of Care Review  Description: The Plan of Care Review/Shift note should be completed every shift.  The Outcome Evaluation is a brief statement about your assessment that the patient is improving, declining, or no change.  This information will be displayed automatically on your shift  note.  Outcome: Progressing  Flowsheets (Taken 1/29/2025 0500)  Outcome Evaluation: Patient remained on BIPAP 30%, 15/6. Patient was able to get uninterrupted sleep from 7378-5772. More redirectable and cooperative tonight. Dilitazem gtt for afib RVR.  Plan of Care Reviewed With:   patient   child  Overall Patient Progress: improving     Problem: Gas Exchange Impaired  Goal: Optimal Gas Exchange  Outcome: Progressing  Intervention: Optimize Oxygenation and Ventilation  Recent Flowsheet Documentation  Taken 1/29/2025 0400 by Carly Robb, RN  Airway/Ventilation Management:   airway patency maintained   humidification applied   oxygen therapy provided   pulmonary hygiene promoted   calming measures promoted  Head of Bed (HOB) Positioning: HOB at 30 degrees  Taken 1/29/2025 0200 by Carly Robb RN  Head of Bed (HOB) Positioning: HOB at 30 degrees  Taken 1/29/2025 0000 by Carly Robb, RN  Airway/Ventilation Management:   airway patency maintained   humidification applied   oxygen therapy provided   pulmonary hygiene promoted   calming measures promoted  Head of Bed (HOB) Positioning: HOB at 30 degrees  Taken 1/28/2025 2000 by Carly Robb, RN  Airway/Ventilation Management:   airway patency maintained   humidification applied    oxygen therapy provided   pulmonary hygiene promoted   calming measures promoted     Problem: Risk for Delirium  Goal: Optimal Coping  Outcome: Progressing  Intervention: Optimize Psychosocial Adjustment to Delirium  Recent Flowsheet Documentation  Taken 1/29/2025 0400 by Carly Robb RN  Supportive Measures:   active listening utilized   problem-solving facilitated   relaxation techniques promoted   self-care encouraged   decision-making supported   goal-setting facilitated   positive reinforcement provided   verbalization of feelings encouraged  Taken 1/29/2025 0000 by Carly Robb RN  Supportive Measures:   active listening utilized   problem-solving facilitated   relaxation techniques promoted   self-care encouraged   decision-making supported   goal-setting facilitated   positive reinforcement provided   verbalization of feelings encouraged  Taken 1/28/2025 2000 by Carly Robb RN  Supportive Measures:   active listening utilized   problem-solving facilitated   relaxation techniques promoted   self-care encouraged   decision-making supported   goal-setting facilitated   positive reinforcement provided   verbalization of feelings encouraged  Family/Support System Care:   involvement promoted   presence promoted   self-care encouraged   support provided  Goal: Improved Behavioral Control  Outcome: Progressing  Intervention: Prevent and Manage Agitation  Recent Flowsheet Documentation  Taken 1/29/2025 0400 by Carly Robb RN  Environment Familiarity/Consistency: daily routine followed  Taken 1/29/2025 0000 by Carly Robb RN  Environment Familiarity/Consistency: daily routine followed  Taken 1/28/2025 2000 by Carly Robb RN  Environment Familiarity/Consistency: daily routine followed  Intervention: Minimize Safety Risk  Recent Flowsheet Documentation  Taken 1/29/2025 0400 by Carly Robb RN  Enhanced Safety Measures:   room near unit station   assistive devices  when indicated   monitor patients coagulation values   pain management   patient/family teach back on injury risk   review medications for side effects with activity  Taken 1/29/2025 0000 by Carly Robb RN  Enhanced Safety Measures:   room near unit station   assistive devices when indicated   monitor patients coagulation values   pain management   patient/family teach back on injury risk   review medications for side effects with activity  Taken 1/28/2025 2000 by Carly Robb RN  Enhanced Safety Measures:   room near unit station   assistive devices when indicated   monitor patients coagulation values   pain management   patient/family teach back on injury risk   review medications for side effects with activity  Trust Relationship/Rapport:   care explained   choices provided   emotional support provided   reassurance provided   thoughts/feelings acknowledged   empathic listening provided   questions answered   questions encouraged  Goal: Improved Attention and Thought Clarity  Outcome: Progressing  Intervention: Maximize Cognitive Function  Recent Flowsheet Documentation  Taken 1/29/2025 0400 by Carly Robb RN  Sensory Stimulation Regulation:   care clustered   television on  Reorientation Measures:   reorientation provided   clock in view   glasses use encouraged   calendar in view   familiar social contact encouraged  Taken 1/29/2025 0000 by Carly Robb RN  Sensory Stimulation Regulation:   care clustered   television on  Reorientation Measures:   reorientation provided   clock in view   glasses use encouraged   calendar in view   familiar social contact encouraged  Taken 1/28/2025 2000 by Carly Robb RN  Sensory Stimulation Regulation:   care clustered   television on  Reorientation Measures:   reorientation provided   clock in view   glasses use encouraged   calendar in view   familiar social contact encouraged  Goal: Improved Sleep  Outcome: Progressing

## 2025-01-29 NOTE — PLAN OF CARE
SLP Update - Unable to complete a VFSS this am due to pt's respiratory status, pt now on HFNC. No overt coughing with limited puree and mildly thick trials at bedside this pm. Recommend caution with continued full liquids/mildly thick liquids, direct supervision/cues for strategies, single bites/sips, sips by tsp or cup, alternate between solids/liquids; HOLD if any difficulty. Recommending VFSS when pt stable off of HFNC.  SLP to follow.

## 2025-01-29 NOTE — PROGRESS NOTES
[facility]    Medicine Progress Note - Hospitalist Service    Date of Admission:  1/8/2025    Assessment & Plan     Supriya Herr is a 74 year old female with PMHx significant for ESRD on HD, CHF, COPD, poor Mobility (Lt AKA, Obesity, WC bound), DM type II, hypertension.  She was brought to the ER by EMS for evaluation of shortness of breath, diagnosed with influenza A and admitted on 1/8/25         Hospital course summary  Patient was admitted, subsequently intubated for worsening aspiratory failure and remained on mechanical ventilator 1/9 - 1/18.  She was reintubated and remained on mechanical ventilator 1/20 - 1/25.  Was treated with Tamiflu, IV antibiotic, steroid, has completed courses.  Hospital course complicated by A-fib RVR.  Was on amiodarone drip which eventually transition to p.o.  Still remain on RVR and diltiazem drip has been initiated.  Current rates 100-110.  On BiPAP but transition to high flow nasal cannula this a.m.  1/29.  Ongoing tube feeding and level 2 diet with thick liquid, SLP eval and plan for video swallow study.  As ongoing delirium managed with Seroquel.      Acute hypoxic respiratory failure  Influenza A pneumonia  Acute COPD exacerbation  Hospital-acquired pneumonia  Pharyngeal edema  Initial presentation with shortness of breath, diagnosed influenza A.   -Completed Tamiflu  -Had bronchoscopy and BAL 1/20, culture grew actinomyces, CT chest showed RML infiltrate versus atelectasis but no sign of actinomycosis per intensivist and felt this was likely colonization.  Pneumonia treated with cefepime and vancomycin, cefepime changed to Levaquin and completed antibiotic course 1/26.  -Was treated with IV steroid, completed course  -Continue nebs-budesonide 1 Mg twice daily, DuoNeb 4 times daily, as needed albuterol.  -HFNC, continue BiPAP as needed especially during sleep.    A-fib RVR  CHFpEF  Hypertension  Shock, due to sedation  PTA is on amlodipine 5 Mg  daily, Coreg 25 Mg p.o. twice daily and Lasix.  -Was started on amiodarone drip, transition to p.o.  -Continues to remain in RVR, on diltiazem drip, 15 Mg/hour currently.  -Continue with Lipitor 20 Mg daily  -On apixaban 5 Mg twice daily.  -TTE this is stay shows LVEF 60%, no pericardial effusion, no significant valvular disease, diastolic function indeterminate.  No WMA.  -Cardiology following appreciate assistance    -PTA Lasix on hold, patient is anuric, managing fluid with hemodialysis.  -Monitor daily weight and intake and output as able.    DM2 HbA1c 6.5.  PTA is on Lantus 18 units daily and NovoLog 5 units 3 times daily  -Currently on Lantus 15 units daily  -ISS  -Blood sugars fairly controlled, in 100s    ESRD on HD Tue-Thu-Sat, anuric  Anemia of chronic disease  -Nephrology following  -Plan is HD in a.m. 1/30  -Daily renal panel  -Hemoglobin 7-8, at baseline  -Continue sevelamer and vitamin D3    Encephalopathy  Anxiety  Had CT head 1/20, negative for acute intracranial process.  Volume loss and chronic microvascular ischemic changes noted.  Suspected some baseline cognitive impairment.  No agitation.  -Continue Seroquel 25 Mg every morning and 50 Mg every afternoon  -Continue PTA Zoloft and gabapentin  -Delirium precaution  -PT, OT eval  -Social work consult for discharge planning    Dysphagia  Suspect due to recurrent intubation.  -On tube feeding, continue, RD following    -Ongoing SLP eval  -Plan is video swallow eval            Diet: Combination Diet Full Liquid Diet; Mildly Thick (level 2) (direct assist, slow rate, single bites/sips; HOLD if any difficulty/resp decline)  Adult Formula Drip Feeding: Continuous [employer] Standard 1.4; Nasoduodenal tube; Goal Rate: 55; mL/hr; Change TF product to [employer] 1.4, begin at 15 mL/hr and increase every 6 hours by 20 mL to goal.    DVT Prophylaxis: DOAC  Bradshaw Catheter: Not present  Lines: PRESENT      CVC Triple Lumen Right Internal jugular-Site  "Assessment: WDL  Hemodialysis Vascular Access AV fistula Superior Arm-Site Assessment: WDL;Thrill present;Bruit present      Cardiac Monitoring: None  Code Status: Full Code      Clinically Significant Risk Factors                      # Obesity: Estimated body mass index is 34.43 kg/m  as calculated from the following:    Height as of this encounter: 1.702 m (5' 7\").    Weight as of this encounter: 99.7 kg (219 lb 12.8 oz).      # Financial/Environmental Concerns: none                 Disposition Plan     Medically Ready for Discharge: Anticipated in 5+ Days             Denver Shipman MD  Hospitalist Service  [facility]  Securely message with idemama (more info)  Text page via [facility] Paging/Directory   ______________________________________________________________________    Interval History   Discussed with intensivist and nursing staff, chart reviewed and patient was seen this afternoon.  Was on BiPAP overnight, transition to HFNC 35 LPM this a.m.  Patient reports her breathing has improved.  Denies shortness of breath despite being on high flow.   No nausea.  Had BM soft per nursing staff this morning, no hematochezia or melena.  Remains on A-fib, heart rate in 100-110.  Denies chest pain.  Tolerating tube feeding.  Patient is anuric    Physical Exam   Vital Signs: Temp: 97.5  F (36.4  C) Temp src: Oral BP: (!) 153/68 Pulse: 110   Resp: (!) 33 SpO2: 96 % O2 Device: High Flow Nasal Cannula (HFNC) Oxygen Delivery: 35 LPM  Weight: 219 lbs 12.78 oz    General: AAOx oriented to self, place, year, not in distress.  HEENT: PERRLA EOMI. Mucosa moist.  Corey feeding tube in nostril present.  No thrush.  Lungs: Bilateral equal air entry.  Diminished but clear to auscultation.shallow breaths, mildly tachypneic, respiratory rate in upper 20s.  On HFNC.  CVS: S1S2 irregular, tachycardic, systolic murmur present.  Abdomen: Soft, obese/distended, mild epigastric tenderness without guarding or rigidity, " bowel tones present.     MSK: No edema or deformities.  Neuro: AAOX3. CN 2-12 normal. Strength symmetrical.  Skin: No rash.       Medical Decision Making       65 MINUTES SPENT BY ME on the date of service doing chart review, history, exam, documentation & further activities per the note.      Data     I have personally reviewed the following data over the past 24 hrs:    4.8  \   8.0 (L)   / 169     140 99 46.3 (H) /  159 (H)   4.1 30 (H) 2.48 (H) \       Imaging results reviewed over the past 24 hrs:   No results found for this or any previous visit (from the past 24 hours).

## 2025-01-29 NOTE — PROGRESS NOTES
Patient should remain in droplet precautions for 7 days from symptom onset or 24 hrs after resolution of fever and symptoms (whichever is longer) for influenza. Patient meets criteria for removal of isolation. Elan Wu, Infection Prevention on 1/29/2025 at 10:10 AM

## 2025-01-30 VITALS
DIASTOLIC BLOOD PRESSURE: 45 MMHG | SYSTOLIC BLOOD PRESSURE: 131 MMHG | BODY MASS INDEX: 35.57 KG/M2 | HEART RATE: 112 BPM | RESPIRATION RATE: 16 BRPM | OXYGEN SATURATION: 98 % | WEIGHT: 226.63 LBS | HEIGHT: 67 IN | TEMPERATURE: 99.7 F

## 2025-01-30 LAB
ANION GAP SERPL CALCULATED.3IONS-SCNC: 10 MMOL/L (ref 7–15)
BACTERIA BRONCH: NORMAL
BACTERIA BRONCH: NORMAL
BASE EXCESS BLDV CALC-SCNC: 5.9 MMOL/L (ref -3–3)
BASOPHILS # BLD AUTO: 0 10E3/UL (ref 0–0.2)
BASOPHILS NFR BLD AUTO: 0 %
BUN SERPL-MCNC: 70.9 MG/DL (ref 8–23)
CALCIUM SERPL-MCNC: 9.8 MG/DL (ref 8.8–10.4)
CHLORIDE SERPL-SCNC: 98 MMOL/L (ref 98–107)
CREAT SERPL-MCNC: 3.65 MG/DL (ref 0.51–0.95)
EGFRCR SERPLBLD CKD-EPI 2021: 12 ML/MIN/1.73M2
EOSINOPHIL # BLD AUTO: 0.2 10E3/UL (ref 0–0.7)
EOSINOPHIL NFR BLD AUTO: 3 %
ERYTHROCYTE [DISTWIDTH] IN BLOOD BY AUTOMATED COUNT: 18.6 % (ref 10–15)
GLUCOSE BLDC GLUCOMTR-MCNC: 162 MG/DL (ref 70–99)
GLUCOSE BLDC GLUCOMTR-MCNC: 163 MG/DL (ref 70–99)
GLUCOSE BLDC GLUCOMTR-MCNC: 196 MG/DL (ref 70–99)
GLUCOSE BLDC GLUCOMTR-MCNC: 197 MG/DL (ref 70–99)
GLUCOSE BLDC GLUCOMTR-MCNC: 199 MG/DL (ref 70–99)
GLUCOSE BLDC GLUCOMTR-MCNC: 204 MG/DL (ref 70–99)
GLUCOSE SERPL-MCNC: 202 MG/DL (ref 70–99)
HCO3 BLDV-SCNC: 31 MMOL/L (ref 21–28)
HCO3 SERPL-SCNC: 29 MMOL/L (ref 22–29)
HCT VFR BLD AUTO: 24.5 % (ref 35–47)
HGB BLD-MCNC: 7.4 G/DL (ref 11.7–15.7)
IMM GRANULOCYTES # BLD: 0 10E3/UL
IMM GRANULOCYTES NFR BLD: 0 %
LYMPHOCYTES # BLD AUTO: 0.7 10E3/UL (ref 0.8–5.3)
LYMPHOCYTES NFR BLD AUTO: 13 %
MAGNESIUM SERPL-MCNC: 2.1 MG/DL (ref 1.7–2.3)
MCH RBC QN AUTO: 31.2 PG (ref 26.5–33)
MCHC RBC AUTO-ENTMCNC: 30.2 G/DL (ref 31.5–36.5)
MCV RBC AUTO: 103 FL (ref 78–100)
MONOCYTES # BLD AUTO: 0.8 10E3/UL (ref 0–1.3)
MONOCYTES NFR BLD AUTO: 15 %
NEUTROPHILS # BLD AUTO: 3.7 10E3/UL (ref 1.6–8.3)
NEUTROPHILS NFR BLD AUTO: 69 %
NRBC # BLD AUTO: 0 10E3/UL
NRBC BLD AUTO-RTO: 0 /100
O2/TOTAL GAS SETTING VFR VENT: 99 %
OXYHGB MFR BLDV: 77 % (ref 70–75)
PCO2 BLDV: 48 MM HG (ref 40–50)
PH BLDV: 7.42 [PH] (ref 7.32–7.43)
PLATELET # BLD AUTO: 159 10E3/UL (ref 150–450)
PO2 BLDV: 46 MM HG (ref 25–47)
POTASSIUM SERPL-SCNC: 4.9 MMOL/L (ref 3.4–5.3)
RBC # BLD AUTO: 2.37 10E6/UL (ref 3.8–5.2)
SAO2 % BLDV: 78.8 % (ref 70–75)
SODIUM SERPL-SCNC: 137 MMOL/L (ref 135–145)
WBC # BLD AUTO: 5.3 10E3/UL (ref 4–11)

## 2025-01-30 PROCEDURE — 90935 HEMODIALYSIS ONE EVALUATION: CPT

## 2025-01-30 PROCEDURE — 99232 SBSQ HOSP IP/OBS MODERATE 35: CPT | Performed by: HOSPITALIST

## 2025-01-30 PROCEDURE — 94640 AIRWAY INHALATION TREATMENT: CPT

## 2025-01-30 PROCEDURE — 85025 COMPLETE CBC W/AUTO DIFF WBC: CPT | Performed by: HOSPITALIST

## 2025-01-30 PROCEDURE — 80048 BASIC METABOLIC PNL TOTAL CA: CPT | Performed by: HOSPITALIST

## 2025-01-30 PROCEDURE — 250N000009 HC RX 250: Performed by: HOSPITALIST

## 2025-01-30 PROCEDURE — 94660 CPAP INITIATION&MGMT: CPT

## 2025-01-30 PROCEDURE — 250N000013 HC RX MED GY IP 250 OP 250 PS 637: Performed by: HOSPITALIST

## 2025-01-30 PROCEDURE — 83735 ASSAY OF MAGNESIUM: CPT | Performed by: HOSPITALIST

## 2025-01-30 PROCEDURE — 258N000003 HC RX IP 258 OP 636: Performed by: HOSPITALIST

## 2025-01-30 PROCEDURE — 210N000001 HC R&B IMCU HEART CARE

## 2025-01-30 PROCEDURE — 634N000001 HC RX 634: Mod: JZ | Performed by: INTERNAL MEDICINE

## 2025-01-30 PROCEDURE — 999N000157 HC STATISTIC RCP TIME EA 10 MIN

## 2025-01-30 PROCEDURE — 258N000003 HC RX IP 258 OP 636: Performed by: INTERNAL MEDICINE

## 2025-01-30 PROCEDURE — 82805 BLOOD GASES W/O2 SATURATION: CPT | Performed by: INTERNAL MEDICINE

## 2025-01-30 PROCEDURE — 82565 ASSAY OF CREATININE: CPT | Performed by: HOSPITALIST

## 2025-01-30 PROCEDURE — 94640 AIRWAY INHALATION TREATMENT: CPT | Mod: 76

## 2025-01-30 PROCEDURE — 250N000011 HC RX IP 250 OP 636: Performed by: INTERNAL MEDICINE

## 2025-01-30 PROCEDURE — 82435 ASSAY OF BLOOD CHLORIDE: CPT | Performed by: HOSPITALIST

## 2025-01-30 RX ORDER — HEPARIN SODIUM 1000 [USP'U]/ML
500 INJECTION, SOLUTION INTRAVENOUS; SUBCUTANEOUS CONTINUOUS
Status: DISCONTINUED | OUTPATIENT
Start: 2025-01-30 | End: 2025-01-30

## 2025-01-30 RX ORDER — ALBUMIN (HUMAN) 12.5 G/50ML
50 SOLUTION INTRAVENOUS
Status: DISCONTINUED | OUTPATIENT
Start: 2025-01-30 | End: 2025-01-30

## 2025-01-30 RX ADMIN — CETIRIZINE HYDROCHLORIDE 5 MG: 5 TABLET ORAL at 21:29

## 2025-01-30 RX ADMIN — BUDESONIDE 1 MG: 1 INHALANT ORAL at 19:55

## 2025-01-30 RX ADMIN — INSULIN GLARGINE 15 UNITS: 100 INJECTION, SOLUTION SUBCUTANEOUS at 21:53

## 2025-01-30 RX ADMIN — DILTIAZEM HYDROCHLORIDE 15 MG/HR: 5 INJECTION, SOLUTION INTRAVENOUS at 11:17

## 2025-01-30 RX ADMIN — SODIUM CHLORIDE 250 ML: 9 INJECTION, SOLUTION INTRAVENOUS at 11:48

## 2025-01-30 RX ADMIN — AMIODARONE HYDROCHLORIDE 200 MG: 200 TABLET ORAL at 10:02

## 2025-01-30 RX ADMIN — ATORVASTATIN CALCIUM 20 MG: 20 TABLET, FILM COATED ORAL at 21:29

## 2025-01-30 RX ADMIN — QUETIAPINE FUMARATE 25 MG: 25 TABLET ORAL at 10:02

## 2025-01-30 RX ADMIN — MICONAZOLE NITRATE: 2 POWDER TOPICAL at 21:36

## 2025-01-30 RX ADMIN — SODIUM CHLORIDE 200 ML: 9 INJECTION, SOLUTION INTRAVENOUS at 11:49

## 2025-01-30 RX ADMIN — HEPARIN SODIUM 500 UNITS/HR: 1000 INJECTION INTRAVENOUS; SUBCUTANEOUS at 11:47

## 2025-01-30 RX ADMIN — Medication: at 11:50

## 2025-01-30 RX ADMIN — APIXABAN 5 MG: 5 TABLET, FILM COATED ORAL at 10:02

## 2025-01-30 RX ADMIN — TACROLIMUS: 1 OINTMENT TOPICAL at 10:09

## 2025-01-30 RX ADMIN — BUDESONIDE 1 MG: 1 INHALANT ORAL at 07:11

## 2025-01-30 RX ADMIN — TACROLIMUS: 1 OINTMENT TOPICAL at 21:36

## 2025-01-30 RX ADMIN — INSULIN GLARGINE 15 UNITS: 100 INJECTION, SOLUTION SUBCUTANEOUS at 10:04

## 2025-01-30 RX ADMIN — APIXABAN 5 MG: 5 TABLET, FILM COATED ORAL at 21:29

## 2025-01-30 RX ADMIN — Medication 5 ML: at 10:02

## 2025-01-30 RX ADMIN — QUETIAPINE FUMARATE 50 MG: 50 TABLET ORAL at 21:29

## 2025-01-30 RX ADMIN — DILTIAZEM HYDROCHLORIDE 15 MG/HR: 5 INJECTION, SOLUTION INTRAVENOUS at 02:30

## 2025-01-30 RX ADMIN — SEVELAMER CARBONATE 800 MG: 800 TABLET, FILM COATED ORAL at 10:02

## 2025-01-30 RX ADMIN — IPRATROPIUM BROMIDE AND ALBUTEROL SULFATE 3 ML: .5; 3 SOLUTION RESPIRATORY (INHALATION) at 10:27

## 2025-01-30 RX ADMIN — SODIUM CHLORIDE 500 ML: 9 INJECTION, SOLUTION INTRAVENOUS at 10:07

## 2025-01-30 RX ADMIN — HEPARIN SODIUM 500 UNITS: 1000 INJECTION INTRAVENOUS; SUBCUTANEOUS at 11:40

## 2025-01-30 RX ADMIN — MICONAZOLE NITRATE: 2 POWDER TOPICAL at 10:09

## 2025-01-30 RX ADMIN — Medication 50 MCG: at 10:02

## 2025-01-30 RX ADMIN — Medication 40 MG: at 12:03

## 2025-01-30 RX ADMIN — EPOETIN ALFA-EPBX 6000 UNITS: 3000 INJECTION, SOLUTION INTRAVENOUS; SUBCUTANEOUS at 11:49

## 2025-01-30 RX ADMIN — IPRATROPIUM BROMIDE AND ALBUTEROL SULFATE 3 ML: .5; 3 SOLUTION RESPIRATORY (INHALATION) at 19:52

## 2025-01-30 RX ADMIN — IPRATROPIUM BROMIDE AND ALBUTEROL SULFATE 3 ML: .5; 3 SOLUTION RESPIRATORY (INHALATION) at 07:10

## 2025-01-30 RX ADMIN — SERTRALINE HYDROCHLORIDE 75 MG: 50 TABLET ORAL at 21:29

## 2025-01-30 RX ADMIN — IPRATROPIUM BROMIDE AND ALBUTEROL SULFATE 3 ML: .5; 3 SOLUTION RESPIRATORY (INHALATION) at 15:30

## 2025-01-30 RX ADMIN — DILTIAZEM HYDROCHLORIDE 15 MG/HR: 5 INJECTION, SOLUTION INTRAVENOUS at 21:44

## 2025-01-30 ASSESSMENT — ACTIVITIES OF DAILY LIVING (ADL)
ADLS_ACUITY_SCORE: 79
ADLS_ACUITY_SCORE: 84
ADLS_ACUITY_SCORE: 79
ADLS_ACUITY_SCORE: 84
ADLS_ACUITY_SCORE: 84
ADLS_ACUITY_SCORE: 79
ADLS_ACUITY_SCORE: 84
ADLS_ACUITY_SCORE: 79
ADLS_ACUITY_SCORE: 79
ADLS_ACUITY_SCORE: 84
ADLS_ACUITY_SCORE: 79
ADLS_ACUITY_SCORE: 84
ADLS_ACUITY_SCORE: 79
ADLS_ACUITY_SCORE: 84
ADLS_ACUITY_SCORE: 79

## 2025-01-30 NOTE — PLAN OF CARE
Transfer from ICU ~1700. A&Ox4 - confused of situation at times. Daughter at bedside. Total feed @ 55. Dilt gtt @ 15mg/hr. Bipap @ 30% FiO2. Wheelchair bound at baseline. Up with a 2+/lift. Anuric.   Plan: possible swallow study tomorrow?

## 2025-01-30 NOTE — PROGRESS NOTES
[facility]    Medicine Progress Note - Hospitalist Service    Date of Admission:  1/8/2025    Assessment & Plan   Supriya Herr is a 74 year old female with PMHx significant for ESRD on HD, CHF, COPD, poor Mobility (Lt AKA, Obesity, WC bound), DM type II, hypertension.  She was brought to the ER by EMS for evaluation of shortness of breath, diagnosed with influenza A and admitted on 1/8/25      Hospital course summary  Patient was admitted, subsequently intubated for worsening aspiratory failure and remained on mechanical ventilator 1/9 - 1/18.  She was reintubated and remained on mechanical ventilator 1/20 - 1/25.  Was treated with Tamiflu, IV antibiotic, steroid, has completed courses.  Hospital course complicated by A-fib RVR.  Was on amiodarone drip which eventually transition to p.o.  Still remain on RVR and diltiazem drip has been initiated.  Current rates 100-110.  On BiPAP but transition to high flow nasal cannula this a.m.  1/29.  Ongoing tube feeding and level 2 diet with thick liquid, SLP eval and plan for video swallow study.  As ongoing delirium managed with Seroquel.    Acute hypoxic respiratory failure  Influenza A pneumonia  Acute COPD exacerbation  Hospital-acquired pneumonia  Pharyngeal edema  Initial presentation with shortness of breath, diagnosed influenza A. Initial course as noted above.  -Completed Tamiflu.  -Had bronchoscopy and BAL 1/20, culture grew actinomyces, CT chest showed RML infiltrate versus atelectasis but no sign of actinomycosis per intensivist and felt this was likely colonization.  Pneumonia treated with cefepime and vancomycin, cefepime changed to Levaquin and completed antibiotic course 1/26.  -Was treated with IV steroid, completed course.  -Continue nebs-budesonide 1 Mg twice daily, DuoNeb 4 times daily, as needed albuterol.  -Weaned down to oxymask on 1/30/25.  Continue to monitor closely, consider BiPAP as neede.    Atrial fibrillation with  RVR  CHFpEF  Hypertension  Shock, due to sedation  PTA is on amlodipine 5 Mg daily, Coreg 25 Mg p.o. twice daily and Lasix.  -Was started on amiodarone drip, transitioned to p.o. on 1/27/25 with places for a 14 day course.  -Continues to remain in RVR, on diltiazem drip, 15 Mg/hour currently.  -Continue with Lipitor 20 Mg daily.  -On apixaban 5 Mg twice daily.  -TTE this is stay shows LVEF 60%, no pericardial effusion, no significant valvular disease, diastolic function indeterminate.  No WMA.  -Cardiology followed previously, signed off 1/21/25.  Consider reconsulting if rate remains difficult to control.  -PTA Lasix on hold, patient is anuric, managing fluid with hemodialysis.  -Monitor daily weight and intake and output as able.    Diabetes mellitus, type 2  HbA1c 6.5% on 1/17/25.  PTA is on Lantus 18 units daily and NovoLog 5 units 3 times daily.  -Currently on Lantus 15 units daily.  -ISS.  -Blood sugars fairly controlled, mostly in 100s.    ESRD on HD Tue-Thu-Sat, anuric  Anemia of chronic disease  -Nephrology following.  -Getting hemodialysis today in her room.  -Daily renal panel.  -Hemoglobin 7-8, at baseline.  -Continue sevelamer and vitamin D3.    Encephalopathy  Anxiety  Had CT head 1/20, negative for acute intracranial process.  Volume loss and chronic microvascular ischemic changes noted.  Suspected some baseline cognitive impairment.  No agitation.  -Continue Seroquel 25 Mg every morning and 50 Mg every afternoon.  -Continue PTA Zoloft and gabapentin.  -Delirium precaution.  -PT, OT eval.  -Social work consult for discharge planning.    Dysphagia  Suspect due to recurrent intubation.  -Continue tube feeding, RD following.  -SLP following, appreciate their assistance.  -Plan for video swallow eval when stable off HFNC.          Diet: Combination Diet Full Liquid Diet; Mildly Thick (level 2) (direct assist, slow rate, single bites/sips; HOLD if any difficulty/resp decline)  Adult Formula Drip Feeding:  "Continuous [employer] Standard 1.4; Nasoduodenal tube; Goal Rate: 55; mL/hr; Change TF product to [employer] 1.4, begin at 15 mL/hr and increase every 6 hours by 20 mL to goal.    DVT Prophylaxis: DOAC  Bradshaw Catheter: Not present  Lines: PRESENT      CVC Triple Lumen Right Internal jugular-Site Assessment: WDL  Hemodialysis Vascular Access AV fistula Superior Arm-Site Assessment: WDL;Thrill present;Bruit present      Cardiac Monitoring: None  Code Status: Full Code      Clinically Significant Risk Factors               # Hypoalbuminemia: Lowest albumin = 2.8 g/dL at 1/13/2025  5:14 AM, will monitor as appropriate     # Hypertension: Noted on problem list  # Chronic heart failure with preserved ejection fraction: heart failure noted on problem list and last echo with EF >50%    # Acute Hypercapnic Respiratory Failure: based on venous blood gas results.  Continue supplemental oxygen and ventilatory support as indicated.        # DMII: A1C = 6.5 % (Ref range: <5.7 %) within past 6 months   # Obesity: Estimated body mass index is 35.5 kg/m  as calculated from the following:    Height as of this encounter: 1.702 m (5' 7\").    Weight as of this encounter: 102.8 kg (226 lb 10.1 oz).      # Financial/Environmental Concerns: none         Social Drivers of Health    Tobacco Use: Medium Risk (9/11/2024)    Patient History     Smoking Tobacco Use: Former     Smokeless Tobacco Use: Never   Physical Activity: Insufficiently Active (7/19/2024)    Exercise Vital Sign     Days of Exercise per Week: 1 day     Minutes of Exercise per Session: 10 min   Social Connections: Unknown (7/19/2024)    Social Connection and Isolation Panel [NHANES]     Frequency of Social Gatherings with Friends and Family: More than three times a week          Disposition Plan     Medically Ready for Discharge: Anticipated in 5+ Days             Braden Montana MD  Hospitalist Service  [facility]  Securely message with Tom " (more info)  Text page via [facility] Paging/Directory   ______________________________________________________________________    Interval History   Supriya Herr was seen this afternoon.  She was getting hemodialysis in her hospital room.  Some shortness of breath, denies pain, nausea.    Physical Exam   Vital Signs: Temp: 97.9  F (36.6  C) Temp src: Axillary BP: 107/52 Pulse: 112   Resp: (!) 41 SpO2: 95 % O2 Device: Oxymask Oxygen Delivery: 2 LPM  Weight: 226 lbs 10.13 oz    Constitutional: awake, alert, cooperative, no apparent distress, laying in the hospital bed undergoing hemodialysis  Respiratory: no increased work of breathing, diminished at the bases  Cardiovascular: irregular, tachycardic, normal S1 and S2, systolic murmur noted  GI: normal bowel sounds, soft, non-distended, mild upper abdominal tenderness, NG tube in place  Skin: warm, dry  Musculoskeletal: no lower extremity pitting edema present  Neurologic: awake, alert, answers questions appropriately, moves all extremities    Medical Decision Making       40 MINUTES SPENT BY ME on the date of service doing chart review, history, exam, documentation & further activities per the note.      Data     I have personally reviewed the following data over the past 24 hrs:    5.3  \   7.4 (L)   / 159     137 98 70.9 (H) /  163 (H)   4.9 29 3.65 (H) \

## 2025-01-30 NOTE — PROGRESS NOTES
Potassium   Date Value Ref Range Status   01/30/2025 4.9 3.4 - 5.3 mmol/L Final   02/02/2022 4.7 3.4 - 5.3 mmol/L Final   04/17/2021 4.2 3.4 - 5.3 mmol/L Final     Hemoglobin   Date Value Ref Range Status   01/30/2025 7.4 (L) 11.7 - 15.7 g/dL Final   04/16/2021 10.9 (L) 11.7 - 15.7 g/dL Final     Creatinine   Date Value Ref Range Status   01/30/2025 3.65 (H) 0.51 - 0.95 mg/dL Final   04/17/2021 5.98 (H) 0.52 - 1.04 mg/dL Final     Urea Nitrogen   Date Value Ref Range Status   01/30/2025 70.9 (H) 8.0 - 23.0 mg/dL Final   11/24/2021 37 (H) 7 - 30 mg/dL Final   04/17/2021 68 (H) 7 - 30 mg/dL Final     Sodium   Date Value Ref Range Status   01/30/2025 137 135 - 145 mmol/L Final   04/17/2021 137 133 - 144 mmol/L Final     INR   Date Value Ref Range Status   04/24/2024 1.11 0.85 - 1.15 Final   04/16/2021 1.14 0.86 - 1.14 Final       DIALYSIS PROCEDURE NOTE  Hepatitis status of previous patient on machine log was checked and verified ok to use with this patients hepatitis status.  Patient dialyzed for 3.5 hrs. on a K2 bath with a net fluid removal of 3.5L. A BFR of 400 ml/min was obtained via a Left AVF using 15 gauge needles.      The treatment plan was discussed with Dr. Lim during the treatment.    Total heparin received during the treatment: 2250 units.   Needle cannulation sites held x 10 min.     Meds given: 6,000 units epoetin given   Complications: None      Person educated: patient and daughter. Knowledge base daughter verbalized understanding of treatment. Barriers to learning: patient somnolent and muted with BiPap mask. Educated on procedure, access care and fluid management via verbal mode.     ICEBOAT? Timeout performed pre-treatment  I: Patient was identified using 2 identifiers  C:  Consent Signed Yes  E: Equipment preventative maintenance is current and dialysis delivery system OK to use  B: Hepatitis B Surface Antigen: Negative; Draw Date: 01/08/2025      Hepatitis B Surface Antibody: Susceptible; Draw  Date: 01/09/2025  O: Dialysis orders present and complete prior to treatment  A: Vascular access verified and assessed prior to treatment  T: Treatment was performed at a clinically appropriate time  ?: Patient was allowed to ask questions and address concerns prior to treatment  See Adult Hemodialysis flowsheet in [facility] for further details and post assessment.  Machine water alarm in place and functioning. Transducer pods intact and checked every 15min.   Pt dialyzed bedside while in BiPap and diltiazem drip  Chlorine/Chloramine water system checked every 4 hours.  Outpatient Dialysis at [facility] on T/Th/Sat    Patient repositioned every 2 hours during the treatment.  Post treatment report given to IVY Torres RN regarding 4L of fluid removed, last BP of 116/60, and no nonverbal signs of patient in pain .     Please remove patient dressing on AVF and AVG needle sites 24 hours after dialysis. If leaking occurs please apply a Band-Aid.

## 2025-01-30 NOTE — TELEPHONE ENCOUNTER
PRIOR AUTHORIZATION DENIED-This may be covered under Part B, but this team does not handle medical billing or PAs    Medication: SEVELAMER CARBONATE 800 MG PO TABS  Insurance Company: [employer] - Phone [phone] Fax [phone]  Denial Date: 1/23/2025  Denial Reason(s):       Appeal Information:

## 2025-01-30 NOTE — PLAN OF CARE
Neuro: A&Ox3, intermittent confusion, garbled/hoarse speech.   Tele/Cardiac: A-fib CVR w/ dilt gtt  Resp: BiPAP worn overnight.  Activity: up with lift. Turn/repo  Pain: denies  Drips/IV: dilt 15 mg/hr  GI/: hemodialysis. Incontinent of bowel.  Skin: red/ rash under breasts, abhijit  Diet: Diet: Combination Diet Full Liquid Diet; Mildly Thick (level 2) (direct assist, slow rate, single bites/sips; HOLD if any difficulty/resp decline)  Adult Formula Drip Feeding: Continuous [employer] Standard 1.4; Nasoduodenal tube; Goal Rate: 55; mL/hr; Change TF product to [employer] 1.4, begin at 15 mL/hr and increase every 6 hours by 20 mL to goal.     Test/Procedures: n/a  Plan: video swallow study, dialysis today

## 2025-01-31 ENCOUNTER — APPOINTMENT (OUTPATIENT)
Dept: GENERAL RADIOLOGY | Facility: CLINIC | Age: 75
DRG: 207 | End: 2025-01-31
Attending: HOSPITALIST
Payer: COMMERCIAL

## 2025-01-31 ENCOUNTER — APPOINTMENT (OUTPATIENT)
Dept: SPEECH THERAPY | Facility: CLINIC | Age: 75
DRG: 207 | End: 2025-01-31
Payer: COMMERCIAL

## 2025-01-31 ENCOUNTER — APPOINTMENT (OUTPATIENT)
Dept: SPEECH THERAPY | Facility: CLINIC | Age: 75
DRG: 207 | End: 2025-01-31
Attending: HOSPITALIST
Payer: COMMERCIAL

## 2025-01-31 LAB
ANION GAP SERPL CALCULATED.3IONS-SCNC: 10 MMOL/L (ref 7–15)
BUN SERPL-MCNC: 47.3 MG/DL (ref 8–23)
CALCIUM SERPL-MCNC: 10 MG/DL (ref 8.8–10.4)
CHLORIDE SERPL-SCNC: 99 MMOL/L (ref 98–107)
CREAT SERPL-MCNC: 2.9 MG/DL (ref 0.51–0.95)
EGFRCR SERPLBLD CKD-EPI 2021: 16 ML/MIN/1.73M2
ERYTHROCYTE [DISTWIDTH] IN BLOOD BY AUTOMATED COUNT: 18.6 % (ref 10–15)
GLUCOSE BLDC GLUCOMTR-MCNC: 149 MG/DL (ref 70–99)
GLUCOSE BLDC GLUCOMTR-MCNC: 162 MG/DL (ref 70–99)
GLUCOSE BLDC GLUCOMTR-MCNC: 195 MG/DL (ref 70–99)
GLUCOSE BLDC GLUCOMTR-MCNC: 205 MG/DL (ref 70–99)
GLUCOSE BLDC GLUCOMTR-MCNC: 206 MG/DL (ref 70–99)
GLUCOSE BLDC GLUCOMTR-MCNC: 229 MG/DL (ref 70–99)
GLUCOSE BLDC GLUCOMTR-MCNC: 239 MG/DL (ref 70–99)
GLUCOSE SERPL-MCNC: 164 MG/DL (ref 70–99)
HCO3 SERPL-SCNC: 31 MMOL/L (ref 22–29)
HCT VFR BLD AUTO: 25.6 % (ref 35–47)
HGB BLD-MCNC: 8.1 G/DL (ref 11.7–15.7)
MAGNESIUM SERPL-MCNC: 2.1 MG/DL (ref 1.7–2.3)
MCH RBC QN AUTO: 32.8 PG (ref 26.5–33)
MCHC RBC AUTO-ENTMCNC: 31.6 G/DL (ref 31.5–36.5)
MCV RBC AUTO: 104 FL (ref 78–100)
PLATELET # BLD AUTO: 174 10E3/UL (ref 150–450)
POTASSIUM SERPL-SCNC: 4.5 MMOL/L (ref 3.4–5.3)
RBC # BLD AUTO: 2.47 10E6/UL (ref 3.8–5.2)
SODIUM SERPL-SCNC: 140 MMOL/L (ref 135–145)
WBC # BLD AUTO: 5.3 10E3/UL (ref 4–11)

## 2025-01-31 PROCEDURE — 80048 BASIC METABOLIC PNL TOTAL CA: CPT | Performed by: HOSPITALIST

## 2025-01-31 PROCEDURE — 93005 ELECTROCARDIOGRAM TRACING: CPT

## 2025-01-31 PROCEDURE — 94640 AIRWAY INHALATION TREATMENT: CPT | Mod: 76

## 2025-01-31 PROCEDURE — 99232 SBSQ HOSP IP/OBS MODERATE 35: CPT | Performed by: HOSPITALIST

## 2025-01-31 PROCEDURE — 94640 AIRWAY INHALATION TREATMENT: CPT

## 2025-01-31 PROCEDURE — 250N000013 HC RX MED GY IP 250 OP 250 PS 637: Performed by: HOSPITALIST

## 2025-01-31 PROCEDURE — 210N000001 HC R&B IMCU HEART CARE

## 2025-01-31 PROCEDURE — 94660 CPAP INITIATION&MGMT: CPT

## 2025-01-31 PROCEDURE — 250N000009 HC RX 250: Performed by: HOSPITALIST

## 2025-01-31 PROCEDURE — 999N000157 HC STATISTIC RCP TIME EA 10 MIN

## 2025-01-31 PROCEDURE — 92611 MOTION FLUOROSCOPY/SWALLOW: CPT | Mod: GN

## 2025-01-31 PROCEDURE — 92526 ORAL FUNCTION THERAPY: CPT | Mod: GN

## 2025-01-31 PROCEDURE — 85018 HEMOGLOBIN: CPT | Performed by: HOSPITALIST

## 2025-01-31 PROCEDURE — 93010 ELECTROCARDIOGRAM REPORT: CPT | Performed by: INTERNAL MEDICINE

## 2025-01-31 PROCEDURE — 83735 ASSAY OF MAGNESIUM: CPT | Performed by: HOSPITALIST

## 2025-01-31 PROCEDURE — 74230 X-RAY XM SWLNG FUNCJ C+: CPT

## 2025-01-31 PROCEDURE — 258N000003 HC RX IP 258 OP 636: Performed by: HOSPITALIST

## 2025-01-31 PROCEDURE — 99231 SBSQ HOSP IP/OBS SF/LOW 25: CPT | Performed by: INTERNAL MEDICINE

## 2025-01-31 RX ORDER — CARVEDILOL 6.25 MG/1
6.25 TABLET ORAL 2 TIMES DAILY WITH MEALS
Status: DISCONTINUED | OUTPATIENT
Start: 2025-01-31 | End: 2025-02-01

## 2025-01-31 RX ADMIN — QUETIAPINE FUMARATE 25 MG: 25 TABLET ORAL at 09:27

## 2025-01-31 RX ADMIN — CARVEDILOL 6.25 MG: 6.25 TABLET, FILM COATED ORAL at 15:07

## 2025-01-31 RX ADMIN — IPRATROPIUM BROMIDE AND ALBUTEROL SULFATE 3 ML: .5; 3 SOLUTION RESPIRATORY (INHALATION) at 15:31

## 2025-01-31 RX ADMIN — AMIODARONE HYDROCHLORIDE 200 MG: 200 TABLET ORAL at 09:55

## 2025-01-31 RX ADMIN — QUETIAPINE FUMARATE 50 MG: 50 TABLET ORAL at 21:48

## 2025-01-31 RX ADMIN — Medication 50 MCG: at 09:55

## 2025-01-31 RX ADMIN — ACETAMINOPHEN 650 MG: 325 TABLET, FILM COATED ORAL at 12:50

## 2025-01-31 RX ADMIN — MICONAZOLE NITRATE: 2 POWDER TOPICAL at 23:06

## 2025-01-31 RX ADMIN — IPRATROPIUM BROMIDE AND ALBUTEROL SULFATE 3 ML: .5; 3 SOLUTION RESPIRATORY (INHALATION) at 11:08

## 2025-01-31 RX ADMIN — SEVELAMER CARBONATE 800 MG: 800 TABLET, FILM COATED ORAL at 09:55

## 2025-01-31 RX ADMIN — DILTIAZEM HYDROCHLORIDE 15 MG/HR: 5 INJECTION, SOLUTION INTRAVENOUS at 15:03

## 2025-01-31 RX ADMIN — INSULIN GLARGINE 15 UNITS: 100 INJECTION, SOLUTION SUBCUTANEOUS at 09:59

## 2025-01-31 RX ADMIN — SERTRALINE HYDROCHLORIDE 75 MG: 50 TABLET ORAL at 21:48

## 2025-01-31 RX ADMIN — APIXABAN 5 MG: 5 TABLET, FILM COATED ORAL at 09:55

## 2025-01-31 RX ADMIN — INSULIN GLARGINE 15 UNITS: 100 INJECTION, SOLUTION SUBCUTANEOUS at 22:11

## 2025-01-31 RX ADMIN — SEVELAMER CARBONATE 800 MG: 800 TABLET, FILM COATED ORAL at 12:50

## 2025-01-31 RX ADMIN — CETIRIZINE HYDROCHLORIDE 5 MG: 5 TABLET ORAL at 21:48

## 2025-01-31 RX ADMIN — MICONAZOLE NITRATE: 2 POWDER TOPICAL at 10:00

## 2025-01-31 RX ADMIN — Medication 5 ML: at 09:55

## 2025-01-31 RX ADMIN — IPRATROPIUM BROMIDE AND ALBUTEROL SULFATE 3 ML: .5; 3 SOLUTION RESPIRATORY (INHALATION) at 18:48

## 2025-01-31 RX ADMIN — DILTIAZEM HYDROCHLORIDE 15 MG/HR: 5 INJECTION, SOLUTION INTRAVENOUS at 06:15

## 2025-01-31 RX ADMIN — APIXABAN 5 MG: 5 TABLET, FILM COATED ORAL at 21:48

## 2025-01-31 RX ADMIN — CALCIUM CARBONATE 1000 MG: 500 TABLET, CHEWABLE ORAL at 12:50

## 2025-01-31 RX ADMIN — PANTOPRAZOLE SODIUM 40 MG: 40 INJECTION, POWDER, FOR SOLUTION INTRAVENOUS at 09:54

## 2025-01-31 RX ADMIN — BUDESONIDE 1 MG: 1 INHALANT ORAL at 07:30

## 2025-01-31 RX ADMIN — TACROLIMUS: 1 OINTMENT TOPICAL at 10:00

## 2025-01-31 RX ADMIN — ATORVASTATIN CALCIUM 20 MG: 20 TABLET, FILM COATED ORAL at 21:48

## 2025-01-31 RX ADMIN — BUDESONIDE 1 MG: 1 INHALANT ORAL at 18:48

## 2025-01-31 RX ADMIN — DILTIAZEM HYDROCHLORIDE 15 MG/HR: 5 INJECTION, SOLUTION INTRAVENOUS at 23:05

## 2025-01-31 RX ADMIN — TACROLIMUS: 1 OINTMENT TOPICAL at 23:06

## 2025-01-31 RX ADMIN — IPRATROPIUM BROMIDE AND ALBUTEROL SULFATE 3 ML: .5; 3 SOLUTION RESPIRATORY (INHALATION) at 07:30

## 2025-01-31 ASSESSMENT — ACTIVITIES OF DAILY LIVING (ADL)
ADLS_ACUITY_SCORE: 85
ADLS_ACUITY_SCORE: 84
ADLS_ACUITY_SCORE: 85
ADLS_ACUITY_SCORE: 85
ADLS_ACUITY_SCORE: 78
ADLS_ACUITY_SCORE: 78
ADLS_ACUITY_SCORE: 85
ADLS_ACUITY_SCORE: 78
ADLS_ACUITY_SCORE: 85
ADLS_ACUITY_SCORE: 78
ADLS_ACUITY_SCORE: 85
ADLS_ACUITY_SCORE: 78
ADLS_ACUITY_SCORE: 78
ADLS_ACUITY_SCORE: 85
ADLS_ACUITY_SCORE: 78
ADLS_ACUITY_SCORE: 85
ADLS_ACUITY_SCORE: 78
ADLS_ACUITY_SCORE: 84

## 2025-01-31 NOTE — PROGRESS NOTES
[facility]    Medicine Progress Note - Hospitalist Service    Date of Admission:  1/8/2025    Assessment & Plan   Supriya Herr is a 74 year old female with PMHx significant for ESRD on HD, CHF, COPD, poor Mobility (Lt AKA, Obesity, WC bound), DM type II, hypertension.  She was brought to the ER by EMS for evaluation of shortness of breath, diagnosed with influenza A and admitted on 1/8/25      Hospital course summary  Patient was admitted, subsequently intubated for worsening aspiratory failure and remained on mechanical ventilator 1/9 - 1/18.  She was reintubated and remained on mechanical ventilator 1/20 - 1/25.  Was treated with Tamiflu, IV antibiotic, steroid, has completed courses.  Hospital course complicated by A-fib RVR.  Was on amiodarone drip which eventually transition to p.o.  Still remain on RVR and diltiazem drip has been initiated.  Current rates 100-110.  On BiPAP but transition to high flow nasal cannula this a.m.  1/29.  Ongoing tube feeding and level 2 diet with thick liquid, SLP eval and plan for video swallow study.  As ongoing delirium managed with Seroquel.    Acute hypoxic respiratory failure  Influenza A pneumonia  Acute COPD exacerbation  Hospital-acquired pneumonia  Pharyngeal edema  Initial presentation with shortness of breath, diagnosed influenza A. Initial course as noted above.  -Completed Tamiflu.  -Had bronchoscopy and BAL 1/20, culture grew actinomyces, CT chest showed RML infiltrate versus atelectasis but no sign of actinomycosis per intensivist and felt this was likely colonization.  Pneumonia treated with cefepime and vancomycin, cefepime changed to Levaquin and completed antibiotic course 1/26.  -Was treated with IV steroid, completed course.  -Continue nebs-budesonide 1 Mg twice daily, DuoNeb 4 times daily, as needed albuterol.  -Weaned down to oxymask on 1/30/25.  Continue to monitor closely, consider BiPAP as needed.    Atrial fibrillation with  RVR  CHFpEF  Hypertension  Shock, due to sedation  PTA is on amlodipine 5 Mg daily, Coreg 25 Mg p.o. twice daily and Lasix.  -Was started on amiodarone drip, transitioned to p.o. on 1/27/25 with plan for a 14 day course.  -Still on diltiazem infusion, wean off as oral meds are restarted.  -Resume PTA carvedilol at a lower dose, titrate up as able.  -Continue with Lipitor 20 Mg daily.  -On apixaban 5 Mg twice daily.  -TTE this is stay shows LVEF 60%, no pericardial effusion, no significant valvular disease, diastolic function indeterminate.  No WMA.  -Cardiology followed previously, signed off 1/21/25.  Consider reconsulting if rate remains difficult to control after resuming and titrating up carvedilol.  -PTA Lasix on hold, patient is anuric, managing fluid with hemodialysis.  -Monitor daily weight and intake and output as able.    Diabetes mellitus, type 2  HbA1c 6.5% on 1/17/25.  PTA is on Lantus 18 units daily and NovoLog 5 units 3 times daily.  -Currently on Lantus 15 units daily.  -ISS.  -Blood sugars fairly well controlled.  Monitor as diet is advanced and tube feedings are discontinued, will likely need to adjust insulin regimen.    ESRD on HD Tue-Thu-Sat, anuric  Anemia of chronic disease  -Nephrology following.  -Getting hemodialysis today in her room.  -Daily renal panel.  -Hemoglobin 7-8, at baseline.  -Gets EPO with hemodialysis.  -Continue sevelamer and vitamin D3.    Encephalopathy  Anxiety  Had CT head 1/20, negative for acute intracranial process.  Volume loss and chronic microvascular ischemic changes noted.  Suspected some baseline cognitive impairment.  No agitation.  -Continue Seroquel 25 Mg every morning and 50 Mg every afternoon.  -Continue PTA Zoloft and gabapentin.  -Delirium precaution.  -PT, OT eval.  -Social work consult for discharge planning.    Dysphagia  Suspect due to recurrent intubation.  -Dietitian consulted.  -Continue tube feedings for now.  -Did well with video swallow study on  "1/31/25, diet is being advanced.  -Consider stopping tube feedings and removing NJ tube in the near future if able to advance diet successfully.          Diet: Combination Diet Full Liquid Diet; Mildly Thick (level 2) (direct assist, slow rate, single bites/sips; HOLD if any difficulty/resp decline)  Adult Formula Drip Feeding: Continuous [employer] Standard 1.4; Nasoduodenal tube; Goal Rate: 55; mL/hr; Change TF product to [employer] 1.4, begin at 15 mL/hr and increase every 6 hours by 20 mL to goal.    DVT Prophylaxis: DOAC  Bradshaw Catheter: Not present  Lines: PRESENT      CVC Triple Lumen Right Internal jugular-Site Assessment: WDL  Hemodialysis Vascular Access AV fistula Superior Arm-Site Assessment: WDL      Cardiac Monitoring: None  Code Status: Full Code      Clinically Significant Risk Factors               # Hypoalbuminemia: Lowest albumin = 2.8 g/dL at 1/13/2025  5:14 AM, will monitor as appropriate     # Hypertension: Noted on problem list  # Chronic heart failure with preserved ejection fraction: heart failure noted on problem list and last echo with EF >50%    # Acute Hypercapnic Respiratory Failure: based on venous blood gas results.  Continue supplemental oxygen and ventilatory support as indicated.        # DMII: A1C = 6.5 % (Ref range: <5.7 %) within past 6 months   # Obesity: Estimated body mass index is 35.5 kg/m  as calculated from the following:    Height as of this encounter: 1.702 m (5' 7\").    Weight as of this encounter: 102.8 kg (226 lb 10.1 oz).      # Financial/Environmental Concerns: none         Social Drivers of Health    Tobacco Use: Medium Risk (9/11/2024)    Patient History     Smoking Tobacco Use: Former     Smokeless Tobacco Use: Never   Physical Activity: Insufficiently Active (7/19/2024)    Exercise Vital Sign     Days of Exercise per Week: 1 day     Minutes of Exercise per Session: 10 min   Social Connections: Unknown (7/19/2024)    Social Connection and Isolation Panel " [NHANES]     Frequency of Social Gatherings with Friends and Family: More than three times a week          Disposition Plan     Medically Ready for Discharge: Anticipated in 2-4 Days             Braden Montana MD  Hospitalist Service  [facility]  Securely message with [employer] (more info)  Text page via [employer] Paging/Directory   ______________________________________________________________________    Interval History   Supriya Herr was seen today. Complains of generalized pain, seems to be worst in her chest. Some shortness of breath, no nausea. Plan of care discussed with bedside nurse.    Physical Exam   Vital Signs: Temp: 98.2  F (36.8  C) Temp src: Oral BP: (!) 152/69 Pulse: 88   Resp: 16 SpO2: 99 % O2 Device: Oxymask Oxygen Delivery: 6 LPM  Weight: 226 lbs 10.13 oz    Constitutional: awake, alert, cooperative, no apparent distress, laying in the hospital bed undergoing hemodialysis  Respiratory: no increased work of breathing, diminished at the bases  Cardiovascular: irregular, tachycardic, normal S1 and S2, systolic murmur noted  GI: normal bowel sounds, soft, non-distended, mild upper abdominal tenderness, NG tube in place  Skin: warm, dry  Musculoskeletal: no lower extremity pitting edema present, left AKA  Neurologic: awake, alert, answers questions appropriately, moves all extremities    Medical Decision Making       40 MINUTES SPENT BY ME on the date of service doing chart review, history, exam, documentation & further activities per the note.      Data     I have personally reviewed the following data over the past 24 hrs:    5.3  \   8.1 (L)   / 174     140 99 47.3 (H) /  239 (H)   4.5 31 (H) 2.90 (H) \       Imaging results reviewed over the past 24 hrs:   Recent Results (from the past 24 hours)   XR Video Swallow with SLP or OT    Narrative    EXAM: XR VIDEO SWALLOW WITH SLP OR OT  LOCATION: [facility]  DATE: 1/31/2025    INDICATION: Difficulty  swallowing.  COMPARISON: None.    TECHNIQUE: Routine swallow study with speech pathology using multiple barium thicknesses.    RADIATION DOSE: Total Air Kerma 1 mGy      Impression    IMPRESSION: No penetration or aspiration with any consistency. Please see speech pathology note for further details.

## 2025-01-31 NOTE — PLAN OF CARE
Confused, agitated at night, BP soft, BIPAP overnight. Assist of 3 with lift, Tele- Afib CVR, HR up to 140s, non sustaining, diltiazem infusing at 15 ml/hr. /162/149. Pt had 2 BM.Tube feeding infusing at 55 ml/hr with 60 ml flush q4. Sitter at bedside for safety.

## 2025-01-31 NOTE — PROGRESS NOTES
"CLINICAL NUTRITION SERVICES - REASSESSMENT NOTE     Malnutrition Status:  (1/21)  % Intake: Decreased intake does not meet criteria (TF was at goal 1/31)  % Weight Loss: Weight loss does not meet criteria  (fluids, down closer to admit weight)  Subcutaneous Fat Loss: None observed (visual, in isolation)  Muscle Loss: None observed (visual, in isolation) - may be masked by obesity   Fluid Accumulation/Edema: Trace, 1+ (likely not nutritional, improved 1/30)  Malnutrition Diagnosis: Patient does not meet two of the established criteria necessary for diagnosing malnutrition  Malnutrition Present on Admission: Unable to assess    Registered Dietitian Interventions:  Continue Soft and Bite Sized diet, thin liquids and EN support as ordered until able to take adequate po intakes      SUBJECTIVE INFORMATION  Patient not available for interview due to busy with SLP when attempted to visit    CURRENT NUTRITION ORDERS  Diet: Full Liquid and Mildly Thick Liquids -> advanced to Soft and Bite Sized diet, thin liquids in afternoon   Nutrition Support: IEV 1.4 at 55 mL/hr = 1848 kcal (28 kcal/kg), 82 g protein (1.3 g/kg), 207 g CHO, 20 g fiber, 937 mL H2O     ASSESSED NUTRITION NEEDS (DW = 65.5 kg (adjusted))  Estimated Energy Needs: 1698-4041 kcals/day (25-30 kcals/kg)  Justification: Obese  Estimated Protein Needs:  grams protein/day (1.2-1.5 grams of pro/kg)  Justification: Hypercatabolism with acute illness, HD  Estimated Fluid Needs: 4110-7821 mL/day (1 mL/kcal)  Justification:  or per MD     CURRENT INTAKE/TOLERANCE  No intakes since diet advanced     NEW FINDINGS  Weight: Wt slowly trending up   01/30/25 0600 102.8 kg (226 lb 10.1 oz)   Skin/wounds: Trace edema  WOCN following for buttock wound (d/t friction) - stable as of 1/15, New consult 1/20 WOCN but \"primary RN scope of practice and per policy for the primary RN to monitor for changes in skin condition...WOC nurse follow-up plan: signing off\"   GI " symptoms: BM 1-2x the past two days  Nutrition-relevant labs:   BUN 47.3 (H), Cr 2.9 (H) - CKD on HD, -197 (Lantus 15 units BID + High SSI)    Nutrition-relevant medications: Insulin drip, Lantus 15 units BID, High sliding scale insulin, Nephronex, Vitamin D3     EVALUATION OF THE PROGRESS TOWARD GOALS   Previous Goals  TF regimen will meet % needs   Evaluation: Met    Previous Nutrition Diagnosis  Inadequate energy intake related to TF at goal, needs re-estimated with extubation as evidenced by meeting 80% low end energy needs   Evaluation: Improving    NUTRITION DIAGNOSIS  Inadequate oral intake related to diet restrictions, previous mild dysphagia now improving as evidenced by  diet just advanced to Soft and Bite Sized diet, but still reliant on EN support to meet nutrition needs until able to take in adequate po intake    INTERVENTIONS  Enteral nutrition management    Goals  TF regimen will meet % needs      Monitoring/Evaluation      Progress toward goals will be monitored and evaluated per policy.

## 2025-01-31 NOTE — PROGRESS NOTES
VSS. Disoriented to time and situation. BiPAP removed around 1300. On 2L oxymask. NJ at 95cm running continuous TF at goal rate of 55mL/hr with 60mL FWF q4hrs. Triple lumen IJ running dilt at 15mg/hr. Attempted to titrate but pt's HR went to the 140s. BG q4hrs, needed coverage every time. Dialysis today, 3.5L off. Tele: Afib CVR/RVR. Ax2 with lift. T&R q2hrs. Incontinent bowel. Anuria. Daughter at bedside. BiPAP placed back on pt per daughter's request.

## 2025-01-31 NOTE — PROGRESS NOTES
Renal Medicine Progress Note            Assessment/Plan:     75 y.o woman with ESRD, admitted for respiratory distress due to influenza A infection.      # ESRD:               -TTS               -NICOLEF               -Dr. Chetna Goode [facility]                 # Influenza A:               -finished Tamiflu     # FEN: trace edema at the thigh. Electrolytes are acceptable.      # Anemia: Hgb is below target but acceptable.                -epo with HD     Plan:  # HD order placed for tomorrow        Interval History:     Afebrile.  VSS.   Going to get swallow eval.            Medications and Allergies:     Current Facility-Administered Medications   Medication Dose Route Frequency Provider Last Rate Last Admin    - MEDICATION INSTRUCTIONS for Dialysis Patients -   Does not apply See Admin Instructions Denver Shipman MD        amiodarone (PACERONE) tablet 200 mg  200 mg Oral or FT or NG tube Daily Denver Shipman MD   200 mg at 01/31/25 0955    apixaban ANTICOAGULANT (ELIQUIS) tablet 5 mg  5 mg Oral BID Denver Shipman MD   5 mg at 01/31/25 0955    atorvastatin (LIPITOR) tablet 20 mg  20 mg Oral or Feeding Tube QPM Denver Shipman MD   20 mg at 01/30/25 2129    B and C vitamin Complex with folic acid (NEPHRONEX) liquid 5 mL  5 mL Per Feeding Tube Daily Denver Shipman MD   5 mL at 01/31/25 0955    budesonide (PULMICORT) neb solution 1 mg  1 mg Nebulization BID Denver Shipman MD   1 mg at 01/31/25 0730    carvedilol (COREG) tablet 6.25 mg  6.25 mg Oral BID w/meals CruzloBraden byrd MD        cetirizine (zyrTEC) tablet 5 mg  5 mg Oral or Feeding Tube At Bedtime Denver Shipman MD   5 mg at 01/30/25 2129    insulin aspart (NovoLOG) injection (RAPID ACTING)  1-12 Units Subcutaneous Q4H eDnver Shipman MD   3 Units at 01/31/25 1249    insulin glargine (LANTUS PEN) injection 15 Units  15 Units Subcutaneous BID Denver Shipman MD   15 Units at 01/31/25 0959    ipratropium - albuterol  "0.5 mg/2.5 mg/3 mL (DUONEB) neb solution 3 mL  3 mL Nebulization 4x daily Denver Shipman MD   3 mL at 01/31/25 1108    miconazole (MICATIN) 2 % powder   Topical BID Denver Shipman MD   Given at 01/31/25 1000    pantoprazole (PROTONIX) 2 mg/mL suspension 40 mg  40 mg Per Feeding Tube QAM AC Denver Shipman MD   40 mg at 01/30/25 1203    Or    pantoprazole (PROTONIX) IV push injection 40 mg  40 mg Intravenous QAM AC Denver Shipman MD   40 mg at 01/31/25 0954    QUEtiapine (SEROquel) tablet 25 mg  25 mg Oral QAM Denver Shipman MD   25 mg at 01/31/25 0927    QUEtiapine (SEROquel) tablet 50 mg  50 mg Oral At Bedtime Denver Shipman MD   50 mg at 01/30/25 2129    sertraline (ZOLOFT) tablet 75 mg  75 mg Oral or Feeding Tube QPM Denver Shipman MD   75 mg at 01/30/25 2129    sevelamer carbonate (RENVELA) tablet 800 mg  800 mg Oral or Feeding Tube TID w/meals Denver Shipman MD   800 mg at 01/31/25 1250    sodium chloride (PF) 0.9% PF flush 3 mL  3 mL Intracatheter Q8H Denver Shipman MD   3 mL at 01/31/25 0958    tacrolimus (PROTOPIC) 0.1 % ointment   Topical BID Denver Shipman MD   Given at 01/31/25 1000    vitamin D3 (CHOLECALCIFEROL) tablet 50 mcg  50 mcg Oral or Feeding Tube Daily Denver Shipman MD   50 mcg at 01/31/25 0955        Allergies   Allergen Reactions    Ampicillin-Sulbactam Sodium Rash     No evidence SJS, but very uncomfortable and precipitated multiple provider visits. Would not use penicillins again if other options available.     Penicillins Rash            Physical Exam:   Vitals were reviewed   , Blood pressure (!) 152/54, pulse 97, temperature 98.4  F (36.9  C), resp. rate 16, height 1.702 m (5' 7\"), weight 102.8 kg (226 lb 10.1 oz), SpO2 96%, not currently breastfeeding.    Wt Readings from Last 3 Encounters:   01/30/25 102.8 kg (226 lb 10.1 oz)   06/14/24 101.1 kg (222 lb 14.2 oz)   06/03/24 101.1 kg (222 lb 14.2 oz)       Intake/Output Summary (Last 24 hours) at 1/31/2025 " 1408  Last data filed at 1/31/2025 1200  Gross per 24 hour   Intake 770 ml   Output 3500 ml   Net -2730 ml     GENERAL APPEARANCE: NAD.  HEENT: EOMI.   RESP: No wheezes  CV: irregular, irregular.  ABDOMEN: obese, soft. NT  EXTREMITIES/SKIN: trace edema         Data:     CBC RESULTS:     Recent Labs   Lab 01/31/25  0607 01/30/25  0540 01/29/25  1355 01/29/25  0436 01/27/25  0804 01/26/25  0409 01/25/25  0412   WBC 5.3 5.3  --  4.8 10.5 4.3 3.9*   RBC 2.47* 2.37*  --  2.36* 2.84* 2.60* 2.48*   HGB 8.1* 7.4* 8.0* 7.5* 8.9* 8.2* 7.9*   HCT 25.6* 24.5*  --  24.2* 29.2* 25.9* 25.0*    159  --  169 306 178 167       Basic Metabolic Panel:  Recent Labs   Lab 01/31/25  1249 01/31/25  0933 01/31/25  0607 01/31/25  0430 01/31/25  0010 01/30/25  2126 01/30/25  0927 01/30/25  0540 01/29/25  0753 01/29/25  0436 01/28/25  0623 01/28/25  0407 01/27/25  0601 01/27/25  0421 01/26/25  1227 01/26/25  1135   NA  --   --  140  --   --   --   --  137  --  140  --  138  --  139  --  134*   POTASSIUM  --   --  4.5  --   --   --   --  4.9  --  4.1  --  4.4  --  4.7  --  4.5   CHLORIDE  --   --  99  --   --   --   --  98  --  99  --  97*  --  98  --  95*   CO2  --   --  31*  --   --   --   --  29  --  30*  --  28  --  26  --  26   BUN  --   --  47.3*  --   --   --   --  70.9*  --  46.3*  --  56.9*  --  77.7*  --  54.7*   CR  --   --  2.90*  --   --   --   --  3.65*  --  2.48*  --  2.54*  --  3.29*  --  2.55*   * 205* 164* 149* 162* 199*   < > 202*   < > 157*   < > 124*  139*   < > 105*   < > 167*   JODY  --   --  10.0  --   --   --   --  9.8  --  10.2  --  10.6*  --  11.2*  --  10.5*    < > = values in this interval not displayed.       INRNo lab results found in last 7 days.   Attestation:   I have reviewed today's relevant vital signs, notes, medications, labs and imaging.    Tian Lim MD  [facility] - Nephrology  Office phone :[phone]  Pager: [phone]

## 2025-01-31 NOTE — PROGRESS NOTES
"Video Fluoroscopic Swallow Study (VFSS)     01/31/25 7357   Appointment Info   Signing Clinician's Name / Credentials (SLP) Alexandria Bowling MS CCC-SLP   General Information   Onset of Illness/Injury or Date of Surgery 01/08/25   Referring Physician Braden Montana MD   Patient/Family Therapy Goal Statement (SLP) To eat/drink   Pertinent History of Current Problem   Per provider \"75 year old female with PMHx significant for ESRD on HD, CHF, COPD, poor Mobility (L AKA, Obesity, WC bound), DM type II, hypertension.  She presented to the ER on 1/8/25 complaining of shortness of breath and was diagnosed with Influenza A. On 1/9/25 she was transferred to the ICU and intubated due to worsening acute on chronic respiratory failure. On the evening of 1/18/25 she was extubated but she was reintubated on 1/20.\" Extubated 1/25. SLP following for post-extubation swallow assessment/tx. Pt has been clinically tolerating full liquids/mildly thick liquids; finally off HFNC, on 3 L NC and appropriate for VFSS.     General Observations Pt alert upright in chair in fluoroscopy suite   Type of Evaluation   Type of Evaluation Swallow Evaluation   General Swallowing Observations   Swallowing Evaluation Videofluoroscopic swallow study (VFSS)   VFSS Evaluation   Radiologist Dr. Mehta   Views Taken left lateral   Physical Location of Procedure [facility], radiology dept, fluoroscopy suite   VFSS Textures Trialed thin liquids;pureed;soft & bite-sized   VFSS Eval: Thin Liquid Texture Trial   Mode of Presentation, Thin Liquid spoon;cup;straw   Order of Presentation 1 tsp, 2 cup, 3 straw, 4 straw   Preparatory Phase poor bolus control   Oral Phase, Thin Liquid premature pharyngeal entry   Bolus Location When Swallow Triggered pyriforms   Pharyngeal Phase, Thin Liquid WFL   Rosenbek's Penetration Aspiration Scale: Thin Liquid Trial Results 2 - contrast enters airway, remains above the vocal cords, no residue remains " (penetration)   Diagnostic Statement x1 instance of flash penetration on last trial with sequential sips   VFSS Evaluation: Puree Solid Texture Trial   Mode of Presentation, Puree spoon;self-fed   Order of Presentation 4   Preparatory Phase WFL   Oral Phase, Puree WFL   Bolus Location When Swallow Triggered valleculae   Pharyngeal Phase, Puree WFL   Rosenbek's Penetration Aspiration Scale: Puree Food Trial Results 1 - no aspiration, contrast does not enter airway   Diagnostic Statement WFL; no residue   VFSS Eval: Soft & Bite Sized   Mode of Presentation spoon   Order of presentation 5   Preparatory Phase   (impaired mastication-swallowed puree portion but spit out sydni cracker (dentures were not in))   Bolus Location When Swallow Triggered valleculae   Pharyngeal Phase WFL   Rosenbek's Penetration Aspiration Scale 1 - no aspiration, contrast does not enter airway   General Therapy Interventions   Planned Therapy Interventions Dysphagia Treatment   Clinical Impression   Criteria for Skilled Therapeutic Interventions Met (SLP Eval) Yes, treatment indicated   SLP Diagnosis minimal dysphagia   Risks & Benefits of therapy have been explained evaluation/treatment results reviewed;care plan/treatment goals reviewed;risks/benefits reviewed;current/potential barriers reviewed;participants voiced agreement with care plan;participants included;patient   Clinical Impression Comments   Video fluoroscopic swallow study completed with thin liquids, puree solids, soft/bite sized solids. Pt currently presents with minimal oropharyngeal dysphagia, overall largely WFL swallow. Pt did not have her dentures in: this resulted in incomplete mastication of chewable solids, spitting out cracker; otherwise WFL oral propulsion and clearance with puree. Mild premature bolus spillage to the pyriform sinuses with thin liquids. The following are grossly WFL: base of tongue retraction, hyolaryngeal elevation/excursion, epiglottic inversion,  pharyngoesophageal segment opening during the swallow. No oropharyngeal residue/retention observed. X1 instance of flash laryngeal penetration with sequential sips of thin via straw; no penetration or aspiration with single sips.     SLP Total Evaluation Time   Evaluation, videofluoroscopic eval of swallow function Minutes (56056) 10   SLP Goals   SLP Predicted Duration/Target Date for Goal Attainment 02/07/25   SLP Goals Swallow   SLP: Safely tolerate diet without signs/symptoms of aspiration Regular diet;Thin liquids;With use of swallow precautions;With use of compensatory swallow strategies;With assistance/supervision   Swallowing Intervention   Treatment of Swallowing Dysfunction &/or Oral Function for Feeding Minutes (90211) 9   Symptoms Noted During/After Treatment None   Treatment Detail/Skilled Intervention   Dysphagia tx completed in room following VFSS. Educated on results of VFSS. Placed upper dentures in (lowers not present). Pt with ongoing difficulty masticating regular solids, but able to chew well with SB6 consistencies. WFL tolerance and no overt clinical signs/sx aspiration with 2 oz thin liquid via straw. Pt in agreement for SB6 diet modifications given mastication skills.     SLP Discharge Planning   SLP Plan PO tolerance, ADAT (?regular if lower dentures brought in)   SLP Discharge Recommendation home with assist   SLP Rationale for DC Rec Pt progressing steadly; may be at baseline given no lower dentures present, likely regular diet if both dentures in   SLP Brief overview of current status    VFSS completed, no aspiration. Recommendations: upgrade to soft/bite sized solids, thin liquids when upright, single bties/sips. Upper dentures in place for all PO.     SLP Time and Intention   Total Session Time (sum of timed and untimed services) 19

## 2025-01-31 NOTE — PLAN OF CARE
BP stable, HR elevated on dilt drip at 15 mg/hr. 3L NC while awake, BIPAP with sleep. Pt is oriented but very forgetful and easily agitated. Sitter remains at bedside as pt impulsively pulls at her BIPAP, FT, and internal jugular line. Pt doest much better whens taff move slopwly and explain each step of the process when rolling in bed. Up to chair with ceiling lift for several hours today. Incont of stool x1. OK for soft/bite sized diet with thin liquids but as diet was advanced pt was more sleepy. Plan to remove FT once PO intake is tolerated, too sleepy to eat this evening. TF at goal, 55 mls with 60 ml q 4 flushes. Skin is improved, rashes almost gone, coccyx skin appears intact. Mepilex for padding.  Blood sugars evelated. Dialysis tomorrow. Update to dtr via phone today.

## 2025-02-01 ENCOUNTER — APPOINTMENT (OUTPATIENT)
Dept: GENERAL RADIOLOGY | Facility: CLINIC | Age: 75
DRG: 207 | End: 2025-02-01
Attending: HOSPITALIST
Payer: COMMERCIAL

## 2025-02-01 LAB
ANION GAP SERPL CALCULATED.3IONS-SCNC: 10 MMOL/L (ref 7–15)
ATRIAL RATE - MUSE: 500 BPM
BASE EXCESS BLDV CALC-SCNC: 6.5 MMOL/L (ref -3–3)
BUN SERPL-MCNC: 70.2 MG/DL (ref 8–23)
CALCIUM SERPL-MCNC: 10.2 MG/DL (ref 8.8–10.4)
CHLORIDE SERPL-SCNC: 99 MMOL/L (ref 98–107)
CREAT SERPL-MCNC: 4.05 MG/DL (ref 0.51–0.95)
DIASTOLIC BLOOD PRESSURE - MUSE: NORMAL MMHG
EGFRCR SERPLBLD CKD-EPI 2021: 11 ML/MIN/1.73M2
ERYTHROCYTE [DISTWIDTH] IN BLOOD BY AUTOMATED COUNT: 18.1 % (ref 10–15)
FERRITIN SERPL-MCNC: 1278 NG/ML (ref 11–328)
GLUCOSE BLDC GLUCOMTR-MCNC: 139 MG/DL (ref 70–99)
GLUCOSE BLDC GLUCOMTR-MCNC: 155 MG/DL (ref 70–99)
GLUCOSE BLDC GLUCOMTR-MCNC: 177 MG/DL (ref 70–99)
GLUCOSE BLDC GLUCOMTR-MCNC: 183 MG/DL (ref 70–99)
GLUCOSE BLDC GLUCOMTR-MCNC: 190 MG/DL (ref 70–99)
GLUCOSE BLDC GLUCOMTR-MCNC: 208 MG/DL (ref 70–99)
GLUCOSE SERPL-MCNC: 191 MG/DL (ref 70–99)
HCO3 BLDV-SCNC: 32 MMOL/L (ref 21–28)
HCO3 SERPL-SCNC: 30 MMOL/L (ref 22–29)
HCT VFR BLD AUTO: 25.4 % (ref 35–47)
HGB BLD-MCNC: 7.8 G/DL (ref 11.7–15.7)
INTERPRETATION ECG - MUSE: NORMAL
IRON BINDING CAPACITY (ROCHE): 187 UG/DL (ref 240–430)
IRON SATN MFR SERPL: 12 % (ref 15–46)
IRON SERPL-MCNC: 23 UG/DL (ref 37–145)
MAGNESIUM SERPL-MCNC: 2.2 MG/DL (ref 1.7–2.3)
MCH RBC QN AUTO: 31.8 PG (ref 26.5–33)
MCHC RBC AUTO-ENTMCNC: 30.7 G/DL (ref 31.5–36.5)
MCV RBC AUTO: 104 FL (ref 78–100)
O2/TOTAL GAS SETTING VFR VENT: 30 %
OXYHGB MFR BLDV: 82 % (ref 70–75)
P AXIS - MUSE: NORMAL DEGREES
PCO2 BLDV: 50 MM HG (ref 40–50)
PH BLDV: 7.41 [PH] (ref 7.32–7.43)
PLATELET # BLD AUTO: 175 10E3/UL (ref 150–450)
PO2 BLDV: 49 MM HG (ref 25–47)
POTASSIUM SERPL-SCNC: 5.7 MMOL/L (ref 3.4–5.3)
PR INTERVAL - MUSE: NORMAL MS
QRS DURATION - MUSE: 84 MS
QT - MUSE: 326 MS
QTC - MUSE: 439 MS
R AXIS - MUSE: 62 DEGREES
RBC # BLD AUTO: 2.45 10E6/UL (ref 3.8–5.2)
SAO2 % BLDV: 84 % (ref 70–75)
SODIUM SERPL-SCNC: 139 MMOL/L (ref 135–145)
SYSTOLIC BLOOD PRESSURE - MUSE: NORMAL MMHG
T AXIS - MUSE: 270 DEGREES
VENTRICULAR RATE- MUSE: 109 BPM
WBC # BLD AUTO: 6.4 10E3/UL (ref 4–11)

## 2025-02-01 PROCEDURE — 250N000013 HC RX MED GY IP 250 OP 250 PS 637: Performed by: HOSPITALIST

## 2025-02-01 PROCEDURE — 258N000003 HC RX IP 258 OP 636

## 2025-02-01 PROCEDURE — 90935 HEMODIALYSIS ONE EVALUATION: CPT

## 2025-02-01 PROCEDURE — 82805 BLOOD GASES W/O2 SATURATION: CPT | Performed by: HOSPITALIST

## 2025-02-01 PROCEDURE — 250N000011 HC RX IP 250 OP 636

## 2025-02-01 PROCEDURE — 80051 ELECTROLYTE PANEL: CPT | Performed by: HOSPITALIST

## 2025-02-01 PROCEDURE — 258N000003 HC RX IP 258 OP 636: Performed by: HOSPITALIST

## 2025-02-01 PROCEDURE — 94640 AIRWAY INHALATION TREATMENT: CPT

## 2025-02-01 PROCEDURE — 250N000009 HC RX 250: Performed by: HOSPITALIST

## 2025-02-01 PROCEDURE — 210N000001 HC R&B IMCU HEART CARE

## 2025-02-01 PROCEDURE — 80048 BASIC METABOLIC PNL TOTAL CA: CPT | Performed by: HOSPITALIST

## 2025-02-01 PROCEDURE — 99232 SBSQ HOSP IP/OBS MODERATE 35: CPT | Performed by: HOSPITALIST

## 2025-02-01 PROCEDURE — 999N000157 HC STATISTIC RCP TIME EA 10 MIN

## 2025-02-01 PROCEDURE — 87040 BLOOD CULTURE FOR BACTERIA: CPT | Performed by: HOSPITALIST

## 2025-02-01 PROCEDURE — 258N000003 HC RX IP 258 OP 636: Performed by: INTERNAL MEDICINE

## 2025-02-01 PROCEDURE — 36415 COLL VENOUS BLD VENIPUNCTURE: CPT | Performed by: HOSPITALIST

## 2025-02-01 PROCEDURE — 71045 X-RAY EXAM CHEST 1 VIEW: CPT

## 2025-02-01 PROCEDURE — 94660 CPAP INITIATION&MGMT: CPT

## 2025-02-01 PROCEDURE — 634N000001 HC RX 634: Mod: JZ | Performed by: INTERNAL MEDICINE

## 2025-02-01 PROCEDURE — 83550 IRON BINDING TEST: CPT | Performed by: INTERNAL MEDICINE

## 2025-02-01 PROCEDURE — 85048 AUTOMATED LEUKOCYTE COUNT: CPT | Performed by: HOSPITALIST

## 2025-02-01 PROCEDURE — 85014 HEMATOCRIT: CPT | Performed by: HOSPITALIST

## 2025-02-01 PROCEDURE — 250N000011 HC RX IP 250 OP 636: Performed by: HOSPITALIST

## 2025-02-01 PROCEDURE — 94640 AIRWAY INHALATION TREATMENT: CPT | Mod: 76

## 2025-02-01 PROCEDURE — 250N000011 HC RX IP 250 OP 636: Performed by: INTERNAL MEDICINE

## 2025-02-01 PROCEDURE — 82728 ASSAY OF FERRITIN: CPT | Performed by: INTERNAL MEDICINE

## 2025-02-01 PROCEDURE — 83735 ASSAY OF MAGNESIUM: CPT | Performed by: HOSPITALIST

## 2025-02-01 PROCEDURE — 250N000009 HC RX 250: Performed by: INTERNAL MEDICINE

## 2025-02-01 PROCEDURE — 99232 SBSQ HOSP IP/OBS MODERATE 35: CPT | Performed by: INTERNAL MEDICINE

## 2025-02-01 PROCEDURE — 83540 ASSAY OF IRON: CPT | Performed by: INTERNAL MEDICINE

## 2025-02-01 RX ORDER — HEPARIN SODIUM 1000 [USP'U]/ML
500 INJECTION, SOLUTION INTRAVENOUS; SUBCUTANEOUS CONTINUOUS
Status: DISCONTINUED | OUTPATIENT
Start: 2025-02-01 | End: 2025-02-01

## 2025-02-01 RX ORDER — ALBUTEROL SULFATE 90 UG/1
1-2 INHALANT RESPIRATORY (INHALATION)
Status: DISCONTINUED | OUTPATIENT
Start: 2025-02-01 | End: 2025-02-02

## 2025-02-01 RX ORDER — DIPHENHYDRAMINE HYDROCHLORIDE 50 MG/ML
50 INJECTION, SOLUTION INTRAMUSCULAR; INTRAVENOUS
Status: DISCONTINUED | OUTPATIENT
Start: 2025-02-01 | End: 2025-02-02

## 2025-02-01 RX ORDER — METHYLPREDNISOLONE SODIUM SUCCINATE 40 MG/ML
40 INJECTION INTRAMUSCULAR; INTRAVENOUS
Status: DISCONTINUED | OUTPATIENT
Start: 2025-02-01 | End: 2025-02-02

## 2025-02-01 RX ORDER — LEVOFLOXACIN 5 MG/ML
750 INJECTION, SOLUTION INTRAVENOUS ONCE
Status: COMPLETED | OUTPATIENT
Start: 2025-02-01 | End: 2025-02-01

## 2025-02-01 RX ORDER — SEVELAMER CARBONATE 800 MG/1
800 TABLET, FILM COATED ORAL
Status: DISCONTINUED | OUTPATIENT
Start: 2025-02-01 | End: 2025-02-17

## 2025-02-01 RX ORDER — MEPERIDINE HYDROCHLORIDE 25 MG/ML
25 INJECTION INTRAMUSCULAR; INTRAVENOUS; SUBCUTANEOUS
Status: DISCONTINUED | OUTPATIENT
Start: 2025-02-01 | End: 2025-02-02

## 2025-02-01 RX ORDER — CARVEDILOL 12.5 MG/1
12.5 TABLET ORAL 2 TIMES DAILY WITH MEALS
Status: DISCONTINUED | OUTPATIENT
Start: 2025-02-01 | End: 2025-02-02

## 2025-02-01 RX ORDER — ALBUMIN (HUMAN) 12.5 G/50ML
50 SOLUTION INTRAVENOUS
Status: DISCONTINUED | OUTPATIENT
Start: 2025-02-01 | End: 2025-02-01

## 2025-02-01 RX ORDER — ALBUTEROL SULFATE 0.83 MG/ML
2.5 SOLUTION RESPIRATORY (INHALATION)
Status: DISCONTINUED | OUTPATIENT
Start: 2025-02-01 | End: 2025-02-02

## 2025-02-01 RX ORDER — DIPHENHYDRAMINE HYDROCHLORIDE 50 MG/ML
25 INJECTION, SOLUTION INTRAMUSCULAR; INTRAVENOUS
Status: DISCONTINUED | OUTPATIENT
Start: 2025-02-01 | End: 2025-02-02

## 2025-02-01 RX ORDER — LEVOFLOXACIN 5 MG/ML
500 INJECTION, SOLUTION INTRAVENOUS
Status: DISCONTINUED | OUTPATIENT
Start: 2025-02-03 | End: 2025-02-04

## 2025-02-01 RX ADMIN — Medication 5 ML: at 08:39

## 2025-02-01 RX ADMIN — HEPARIN SODIUM 500 UNITS: 1000 INJECTION INTRAVENOUS; SUBCUTANEOUS at 19:02

## 2025-02-01 RX ADMIN — AMIODARONE HYDROCHLORIDE 200 MG: 200 TABLET ORAL at 08:39

## 2025-02-01 RX ADMIN — LIDOCAINE HYDROCHLORIDE 0.5 ML: 10 INJECTION, SOLUTION EPIDURAL; INFILTRATION; INTRACAUDAL; PERINEURAL at 19:02

## 2025-02-01 RX ADMIN — APIXABAN 5 MG: 5 TABLET, FILM COATED ORAL at 08:39

## 2025-02-01 RX ADMIN — Medication 50 MCG: at 08:39

## 2025-02-01 RX ADMIN — MICONAZOLE NITRATE: 2 POWDER TOPICAL at 23:53

## 2025-02-01 RX ADMIN — TACROLIMUS: 1 OINTMENT TOPICAL at 23:53

## 2025-02-01 RX ADMIN — BUDESONIDE 1 MG: 1 INHALANT ORAL at 08:01

## 2025-02-01 RX ADMIN — INSULIN GLARGINE 15 UNITS: 100 INJECTION, SOLUTION SUBCUTANEOUS at 23:56

## 2025-02-01 RX ADMIN — CARVEDILOL 12.5 MG: 12.5 TABLET, FILM COATED ORAL at 23:53

## 2025-02-01 RX ADMIN — VANCOMYCIN HYDROCHLORIDE 2000 MG: 10 INJECTION, POWDER, LYOPHILIZED, FOR SOLUTION INTRAVENOUS at 23:54

## 2025-02-01 RX ADMIN — PANTOPRAZOLE SODIUM 40 MG: 40 INJECTION, POWDER, FOR SOLUTION INTRAVENOUS at 05:55

## 2025-02-01 RX ADMIN — SERTRALINE HYDROCHLORIDE 75 MG: 50 TABLET ORAL at 23:54

## 2025-02-01 RX ADMIN — IPRATROPIUM BROMIDE AND ALBUTEROL SULFATE 3 ML: .5; 3 SOLUTION RESPIRATORY (INHALATION) at 19:20

## 2025-02-01 RX ADMIN — ACETAMINOPHEN 650 MG: 325 TABLET, FILM COATED ORAL at 14:48

## 2025-02-01 RX ADMIN — ATORVASTATIN CALCIUM 20 MG: 20 TABLET, FILM COATED ORAL at 23:54

## 2025-02-01 RX ADMIN — QUETIAPINE FUMARATE 50 MG: 50 TABLET ORAL at 23:54

## 2025-02-01 RX ADMIN — SODIUM CHLORIDE 250 ML: 9 INJECTION, SOLUTION INTRAVENOUS at 19:02

## 2025-02-01 RX ADMIN — BUDESONIDE 1 MG: 1 INHALANT ORAL at 19:20

## 2025-02-01 RX ADMIN — IPRATROPIUM BROMIDE AND ALBUTEROL SULFATE 3 ML: .5; 3 SOLUTION RESPIRATORY (INHALATION) at 10:47

## 2025-02-01 RX ADMIN — SEVELAMER CARBONATE 800 MG: 800 TABLET, FILM COATED ORAL at 08:39

## 2025-02-01 RX ADMIN — QUETIAPINE FUMARATE 25 MG: 25 TABLET ORAL at 08:39

## 2025-02-01 RX ADMIN — LEVOFLOXACIN 750 MG: 5 INJECTION, SOLUTION INTRAVENOUS at 17:34

## 2025-02-01 RX ADMIN — DILTIAZEM HYDROCHLORIDE 10 MG/HR: 5 INJECTION, SOLUTION INTRAVENOUS at 20:28

## 2025-02-01 RX ADMIN — IRON SUCROSE 200 MG: 20 INJECTION, SOLUTION INTRAVENOUS at 14:48

## 2025-02-01 RX ADMIN — ACETAMINOPHEN 650 MG: 325 TABLET, FILM COATED ORAL at 20:28

## 2025-02-01 RX ADMIN — DILTIAZEM HYDROCHLORIDE 10 MG/HR: 5 INJECTION, SOLUTION INTRAVENOUS at 08:38

## 2025-02-01 RX ADMIN — IPRATROPIUM BROMIDE AND ALBUTEROL SULFATE 3 ML: .5; 3 SOLUTION RESPIRATORY (INHALATION) at 14:50

## 2025-02-01 RX ADMIN — HEPARIN SODIUM 500 UNITS/HR: 1000 INJECTION INTRAVENOUS; SUBCUTANEOUS at 19:02

## 2025-02-01 RX ADMIN — IPRATROPIUM BROMIDE AND ALBUTEROL SULFATE 3 ML: .5; 3 SOLUTION RESPIRATORY (INHALATION) at 08:01

## 2025-02-01 RX ADMIN — INSULIN GLARGINE 15 UNITS: 100 INJECTION, SOLUTION SUBCUTANEOUS at 08:42

## 2025-02-01 RX ADMIN — TACROLIMUS: 1 OINTMENT TOPICAL at 08:41

## 2025-02-01 RX ADMIN — APIXABAN 5 MG: 5 TABLET, FILM COATED ORAL at 23:54

## 2025-02-01 RX ADMIN — MICONAZOLE NITRATE: 2 POWDER TOPICAL at 08:41

## 2025-02-01 RX ADMIN — CETIRIZINE HYDROCHLORIDE 5 MG: 5 TABLET ORAL at 23:54

## 2025-02-01 RX ADMIN — EPOETIN ALFA-EPBX 6000 UNITS: 3000 INJECTION, SOLUTION INTRAVENOUS; SUBCUTANEOUS at 19:06

## 2025-02-01 ASSESSMENT — ACTIVITIES OF DAILY LIVING (ADL)
ADLS_ACUITY_SCORE: 85
ADLS_ACUITY_SCORE: 83
ADLS_ACUITY_SCORE: 85
ADLS_ACUITY_SCORE: 85
ADLS_ACUITY_SCORE: 87
ADLS_ACUITY_SCORE: 85
ADLS_ACUITY_SCORE: 87
ADLS_ACUITY_SCORE: 85
ADLS_ACUITY_SCORE: 87
ADLS_ACUITY_SCORE: 85
ADLS_ACUITY_SCORE: 87
ADLS_ACUITY_SCORE: 83
ADLS_ACUITY_SCORE: 85
ADLS_ACUITY_SCORE: 85

## 2025-02-01 NOTE — PROGRESS NOTES
Heart Failure Care Map  GOALS TO BE MET BEFORE DISCHARGE:    1. Decrease congestion and/or edema with diuretic therapy to achieve near optimal volume status.     Dyspnea improved: No, further care required to meet this goal. Please explain no   Edema improved: Yes, satisfactory for discharge.        Last 24 hour I/O:   Intake/Output Summary (Last 24 hours) at 2/1/2025 0650  Last data filed at 2/1/2025 0600  Gross per 24 hour   Intake 1300 ml   Output --   Net 1300 ml           Net I/O and Weights since admission:   01/02 0700 - 02/01 0659  In: 28155.12 [P.O.:540; I.V.:43016.12]  Out: 72645   Net: 27007.12     Vitals:    01/08/25 0727 01/08/25 1942 01/09/25 0743 01/09/25 0958   Weight: 119 kg (262 lb 5.6 oz) 96.8 kg (213 lb 6.5 oz) 95.6 kg (210 lb 12.2 oz) 96.3 kg (212 lb 4.9 oz)    01/10/25 0400 01/11/25 0000 01/12/25 0000 01/13/25 0348   Weight: 98.4 kg (216 lb 14.9 oz) 96.5 kg (212 lb 11.9 oz) 97.3 kg (214 lb 8.1 oz) 98.8 kg (217 lb 13 oz)    01/14/25 0500 01/15/25 0100 01/16/25 0300 01/17/25 0600   Weight: 99.4 kg (219 lb 2.2 oz) 99.4 kg (219 lb 2.2 oz) 101.8 kg (224 lb 6.9 oz) 100.7 kg (222 lb 0.1 oz)    01/18/25 0200 01/19/25 0400 01/20/25 0500 01/21/25 0400   Weight: 101.2 kg (223 lb 1.7 oz) 99.3 kg (218 lb 14.7 oz) 100.4 kg (221 lb 5.5 oz) 96 kg (211 lb 10.3 oz)    01/23/25 0000 01/24/25 0200 01/25/25 0500 01/26/25 0300   Weight: 101 kg (222 lb 10.6 oz) 102 kg (224 lb 13.9 oz) 102.8 kg (226 lb 10.1 oz) 102 kg (224 lb 13.9 oz)    01/27/25 0200 01/28/25 0300 01/29/25 0500 01/30/25 0600   Weight: 106.5 kg (234 lb 12.6 oz) 103.9 kg (229 lb 0.9 oz) 99.7 kg (219 lb 12.8 oz) 102.8 kg (226 lb 10.1 oz)    02/01/25 0600   Weight: 110.2 kg (242 lb 15.2 oz)       2.  O2 sats > 90% on room air, or at prior home O2 therapy level.      Able to wean O2 this shift to keep sats above 90%?: No, further care required to meet this goal. Please explain On Bipap all night   Does patient use Home O2? No          Current oxygenation  status:   SpO2: 95 %     O2 Device: BiPAP/CPAP, Oxygen Delivery: 3 LPM    3.  Tolerates ambulation and mobility near baseline.     Ambulation: No, further care required to meet this goal. Please explain Bed rest all night   Times patient ambulated with staff this shift: 0    Please review the Heart Failure Care Map for additional HF goal outcomes.    Gerri Hallman, RN  2/1/2025

## 2025-02-01 NOTE — PROGRESS NOTES
Pt requested to come off BIPAP, was breathing quite comfortably. RN placed pt on NC at 3 L, which she used successfully for several hours yesterday. Within 1 minutes pt had a RR of 45, shallow breaths and was quite anxious stating she couldn't breathe. BIPAP was replaced. Pt was repositioned. RR came down to 20's. Continue close monitoring of resp status.

## 2025-02-01 NOTE — PLAN OF CARE
BP and HR stable. Dilt at 10. Needing BIPAP to breath comfortably. This afternoon tolerated only 20 minutes of NC before needing BIPAP again for work of breathing. Turn/repo. NPO related to resp status. TF at 55. Blood sugars elevated but stable. Starting abx for a new PNA. Blood cultures pending. Dialysis yet to be done today.

## 2025-02-01 NOTE — PROGRESS NOTES
Renal Medicine Progress Note            Assessment/Plan:     75 y.o woman with ESRD, admitted for respiratory distress due to influenza A infection.      # ESRD:               -TTS               -NICOLEF               [facility]                 # Influenza A:               -finished Tamiflu     # FEN: No edema at the thigh. Mild hypekalemia.     # Anemia: Hgb is below target but acceptable.                -epo with HD     Plan:  # Hemodialysis later today  # Venofer 200 mg IV          Interval History:     Patient is sleeping with BIPAP.  Sitter is at the bedside.         Medications and Allergies:     Current Facility-Administered Medications   Medication Dose Route Frequency Provider Last Rate Last Admin    - MEDICATION INSTRUCTIONS for Dialysis Patients -   Does not apply See Admin Instructions Denver Shipman MD        amiodarone (PACERONE) tablet 200 mg  200 mg Oral or FT or NG tube Daily Denver Shipman MD   200 mg at 02/01/25 0839    apixaban ANTICOAGULANT (ELIQUIS) tablet 5 mg  5 mg Oral BID Denver Shipman MD   5 mg at 02/01/25 0839    atorvastatin (LIPITOR) tablet 20 mg  20 mg Oral or Feeding Tube QPM Denver Shipman MD   20 mg at 01/31/25 2148    B and C vitamin Complex with folic acid (NEPHRONEX) liquid 5 mL  5 mL Per Feeding Tube Daily Denver Shipman MD   5 mL at 02/01/25 0839    budesonide (PULMICORT) neb solution 1 mg  1 mg Nebulization BID Denver Shipman MD   1 mg at 02/01/25 0801    carvedilol (COREG) tablet 12.5 mg  12.5 mg Oral BID w/meals CruzloBraden byrd MD        cetirizine (zyrTEC) tablet 5 mg  5 mg Oral or Feeding Tube At Bedtime Denver Shipman MD   5 mg at 01/31/25 2148    epoetin candy-epbx (RETACRIT) injection 6,000 Units  6,000 Units Intravenous Once Tian Lim MD        heparin (porcine) injection  500 Units Hemodialysis Machine OR IV Push Once in dialysis/CRRT Tian Lim MD        insulin aspart (NovoLOG) injection  (RAPID ACTING)  1-12 Units Subcutaneous Q4H Denver Shipman MD   3 Units at 02/01/25 0842    insulin glargine (LANTUS PEN) injection 15 Units  15 Units Subcutaneous BID Denver Shipman MD   15 Units at 02/01/25 0842    ipratropium - albuterol 0.5 mg/2.5 mg/3 mL (DUONEB) neb solution 3 mL  3 mL Nebulization 4x daily Denver Shipman MD   3 mL at 02/01/25 1047    miconazole (MICATIN) 2 % powder   Topical BID Denver Shipman MD   Given at 02/01/25 0841    pantoprazole (PROTONIX) 2 mg/mL suspension 40 mg  40 mg Per Feeding Tube QAM Denver Mays MD   40 mg at 01/30/25 1203    Or    pantoprazole (PROTONIX) IV push injection 40 mg  40 mg Intravenous QAM  Denver Shipman MD   40 mg at 02/01/25 0555    QUEtiapine (SEROquel) tablet 25 mg  25 mg Oral QAM Denver Shipman MD   25 mg at 02/01/25 0839    QUEtiapine (SEROquel) tablet 50 mg  50 mg Oral At Bedtime Denver Shipman MD   50 mg at 01/31/25 2148    sertraline (ZOLOFT) tablet 75 mg  75 mg Oral or Feeding Tube QPM Denver Shipman MD   75 mg at 01/31/25 2148    sevelamer carbonate (RENVELA) tablet 800 mg  800 mg Oral or Feeding Tube TID w/meals Denver Shipman MD   800 mg at 02/01/25 0839    sodium chloride (PF) 0.9% PF flush 3 mL  3 mL Intracatheter Q8H Denver Shipman MD   3 mL at 02/01/25 0555    sodium chloride 0.9% BOLUS 200 mL  200 mL Hemodialysis Machine Once Tian Lim MD        sodium chloride 0.9% BOLUS 250 mL  250 mL Intravenous Once in dialysis/CRRT Tian Lim MD        sodium chloride 0.9% BOLUS 500 mL  500 mL Hemodialysis Machine Once Tian Lim MD        tacrolimus (PROTOPIC) 0.1 % ointment   Topical BID Denver Shipman MD   Given at 02/01/25 0841    vitamin D3 (CHOLECALCIFEROL) tablet 50 mcg  50 mcg Oral or Feeding Tube Daily Denver Shipman MD   50 mcg at 02/01/25 0839        Allergies   Allergen Reactions    Ampicillin-Sulbactam Sodium Rash     No evidence SJS, but very uncomfortable and precipitated  "multiple provider visits. Would not use penicillins again if other options available.     Penicillins Rash            Physical Exam:   Vitals were reviewed   , Blood pressure (!) 152/61, pulse 97, temperature 97.5  F (36.4  C), temperature source Axillary, resp. rate 23, height 1.702 m (5' 7\"), weight 110.2 kg (242 lb 15.2 oz), SpO2 98%, not currently breastfeeding.    Wt Readings from Last 3 Encounters:   02/01/25 110.2 kg (242 lb 15.2 oz)   06/14/24 101.1 kg (222 lb 14.2 oz)   06/03/24 101.1 kg (222 lb 14.2 oz)       Intake/Output Summary (Last 24 hours) at 2/1/2025 1219  Last data filed at 2/1/2025 1045  Gross per 24 hour   Intake 1570 ml   Output --   Net 1570 ml     GENERAL APPEARANCE: NAD.  HEENT: sleeping . + bipap  RESP: No wheezes  CV: irregular, irregular.  ABDOMEN: obese, soft. NT  EXTREMITIES/SKIN: no edema         Data:     CBC RESULTS:     Recent Labs   Lab 02/01/25  0553 01/31/25  0607 01/30/25  0540 01/29/25  1355 01/29/25  0436 01/27/25  0804 01/26/25  0409   WBC 6.4 5.3 5.3  --  4.8 10.5 4.3   RBC 2.45* 2.47* 2.37*  --  2.36* 2.84* 2.60*   HGB 7.8* 8.1* 7.4* 8.0* 7.5* 8.9* 8.2*   HCT 25.4* 25.6* 24.5*  --  24.2* 29.2* 25.9*    174 159  --  169 306 178       Basic Metabolic Panel:  Recent Labs   Lab 02/01/25  0837 02/01/25  0553 02/01/25  0418 02/01/25  0100 01/31/25  2153 01/31/25  2134 01/31/25  0933 01/31/25  0607 01/30/25  0927 01/30/25  0540 01/29/25  0753 01/29/25  0436 01/28/25  0623 01/28/25  0407 01/27/25  0601 01/27/25  0421   NA  --  139  --   --   --   --   --  140  --  137  --  140  --  138  --  139   POTASSIUM  --  5.7*  --   --   --   --   --  4.5  --  4.9  --  4.1  --  4.4  --  4.7   CHLORIDE  --  99  --   --   --   --   --  99  --  98  --  99  --  97*  --  98   CO2  --  30*  --   --   --   --   --  31*  --  29  --  30*  --  28  --  26   BUN  --  70.2*  --   --   --   --   --  47.3*  --  70.9*  --  46.3*  --  56.9*  --  77.7*   CR  --  4.05*  --   --   --   --   --  2.90*  --  " 3.65*  --  2.48*  --  2.54*  --  3.29*   * 191* 155* 183* 195* 229*   < > 164*   < > 202*   < > 157*   < > 124*  139*   < > 105*   JODY  --  10.2  --   --   --   --   --  10.0  --  9.8  --  10.2  --  10.6*  --  11.2*    < > = values in this interval not displayed.       INRNo lab results found in last 7 days.   Attestation:   I have reviewed today's relevant vital signs, notes, medications, labs and imaging.    Tian Lim MD  [facility] - Nephrology  Office phone :[phone]  Pager: [phone]

## 2025-02-01 NOTE — PLAN OF CARE
VS:  Stable  Assist by: x2 with Bed mobility; Lift t/F     Cardiac: RRR; CP free overnight   Neuro: A&Ox3-4  CMS: Denies paresthesias   Respi: diminished to auscultation; on BiPAP overnight  : Anuric; On HD  GI: Abdo obese, soft, non-tender; BS audible;  Continuous [employer] Standard 1.4; Nasoduodenal tube; Goal Rate: 55; mL/hr; Change TF product to [employer] 1.4, begin at 15 mL/hr and increase every 6 hours by 20 mL to goal.      Strip/Tele: Aflutter CVR    Pressure areas/Skin:    - PA's intact;    - drying rashes to skin folds    - Blanchable redness to Sacrum/coccyx  --- Foam dressing applied        LDA's:     R IJ ongoing Diltiazem gtt  NG Tube at L Nostril; Secured with Bridle -- on [employer] 1.4 feeds at 55mL/hr;     Plans:    > Continue to monitor closely, consider BiPAP as needed.   > Daily renal panel.   > -Consider stopping tube feedings and removing NJ tube in the near future if able to advance diet successfully.   >  > Cardio:   - signed off 1/21/25.       > Renal:   - HD order placed for tomorrow       > SLP:   - VFSS completed, no aspiration. Ipgrade to soft/bite sized solids, thin liquids when upright, single bties/sips. Upper dentures in place for all PO.       Problem: Adult Inpatient Plan of Care  Goal: Absence of Hospital-Acquired Illness or Injury  Intervention: Identify and Manage Fall Risk  Recent Flowsheet Documentation  Taken 1/31/2025 2200 by Gerri Hallman RN  Safety Promotion/Fall Prevention:   safety round/check completed   supervised activity   room near nurse's station   room door open   patient and family education   nonskid shoes/slippers when out of bed   mobility aid in reach   lighting adjusted   increased rounding and observation   clutter free environment maintained  Intervention: Prevent Skin Injury  Recent Flowsheet Documentation  Taken 1/31/2025 2200 by Gerri Hallman, RN  Body Position:   turned   left   side-lying 30 degrees     Problem: Fall  Injury Risk  Goal: Absence of Fall and Fall-Related Injury  Intervention: Identify and Manage Contributors  Recent Flowsheet Documentation  Taken 1/31/2025 2200 by Gerri Hallman RN  Medication Review/Management: medications reviewed  Intervention: Promote Injury-Free Environment  Recent Flowsheet Documentation  Taken 1/31/2025 2200 by Gerri Hallman RN  Safety Promotion/Fall Prevention:   safety round/check completed   supervised activity   room near nurse's station   room door open   patient and family education   nonskid shoes/slippers when out of bed   mobility aid in reach   lighting adjusted   increased rounding and observation   clutter free environment maintained     Problem: Pain Acute  Goal: Optimal Pain Control and Function  Intervention: Prevent or Manage Pain  Recent Flowsheet Documentation  Taken 1/31/2025 2200 by Gerri Hallman RN  Medication Review/Management: medications reviewed     Problem: Gas Exchange Impaired  Goal: Optimal Gas Exchange  Intervention: Optimize Oxygenation and Ventilation  Recent Flowsheet Documentation  Taken 1/31/2025 2200 by Gerri Hallman RN  Head of Bed (HOB) Positioning: HOB at 20-30 degrees     Problem: Restraint, Nonviolent  Goal: Absence of Harm or Injury  Intervention: Protect Skin and Joint Integrity  Recent Flowsheet Documentation  Taken 1/31/2025 2200 by Gerri Hallman RN  Body Position:   turned   left   side-lying 30 degrees     Problem: Risk for Delirium  Goal: Improved Behavioral Control  Intervention: Minimize Safety Risk  Recent Flowsheet Documentation  Taken 1/31/2025 2200 by Gerri Hallman RN  Enhanced Safety Measures: review medications for side effects with activity     Problem: Mechanical Ventilation Invasive  Goal: Mechanical Ventilation Liberation  Intervention: Promote Extubation and Mechanical Ventilation Liberation  Recent Flowsheet Documentation  Taken 1/31/2025 2200 by  Gerri Hallman, RN  Medication Review/Management: medications reviewed  Goal: Absence of Ventilator-Induced Lung Injury  Intervention: Prevent Ventilator-Associated Pneumonia  Recent Flowsheet Documentation  Taken 1/31/2025 2200 by Gerri Hallman, RN  Head of Bed (HOB) Positioning: HOB at 20-30 degrees

## 2025-02-01 NOTE — PROGRESS NOTES
[facility]    Medicine Progress Note - Hospitalist Service    Date of Admission:  1/8/2025    Assessment & Plan   Supriya Herr is a 74 year old female with PMHx significant for ESRD on HD, CHF, COPD, poor Mobility (Lt AKA, Obesity, WC bound), DM type II, hypertension.  She was brought to the ER by EMS for evaluation of shortness of breath, diagnosed with influenza A and admitted on 1/8/25      Hospital course summary  Patient was admitted, subsequently intubated for worsening aspiratory failure and remained on mechanical ventilator 1/9 - 1/18.  She was reintubated and remained on mechanical ventilator 1/20 - 1/25.  Was treated with Tamiflu, IV antibiotic, steroid, has completed courses.  Hospital course complicated by A-fib RVR.  Was on amiodarone drip which eventually transition to p.o.  Still remain on RVR and diltiazem drip has been initiated.  Current rates 100-110.  On BiPAP but transition to high flow nasal cannula this a.m.  1/29.  Ongoing tube feeding and level 2 diet with thick liquid, SLP eval and plan for video swallow study.  As ongoing delirium managed with Seroquel.    Acute hypoxic respiratory failure  Influenza A pneumonia  Acute COPD exacerbation  Hospital-acquired pneumonia  Pharyngeal edema  Initial presentation with shortness of breath, diagnosed influenza A. Initial course as noted above.  -Completed Tamiflu.  -Had bronchoscopy and BAL 1/20, culture grew actinomyces, CT chest showed RML infiltrate versus atelectasis but no sign of actinomycosis per intensivist and felt this was likely colonization.  Pneumonia treated with cefepime and vancomycin, cefepime changed to Levaquin and completed antibiotic course 1/26.  -Was treated with IV steroid, completed course.  -Continue nebs-budesonide 1 Mg twice daily, DuoNeb 4 times daily, as needed albuterol.  -Weaned down to oxymask on 1/30/25.  Continue to monitor closely, consider BiPAP as needed.    Right upper lobe  pneumonia  Increased hypoxia and shortness of breath on the morning of 2/1/25.  WBC within normal limits.  Afebrile.  VBG OK.  -Concern for RUL pneumonia, will obtain blood culture and start empiric antibiotics.  -Consider ID consult pending clinical course.    Atrial fibrillation with RVR  CHFpEF  Hypertension  Shock, due to sedation  PTA is on amlodipine 5 Mg daily, Coreg 25 Mg p.o. twice daily and Lasix.  -Was started on amiodarone drip, transitioned to p.o. on 1/27/25 with plan for a 14 day course.  -Still on diltiazem infusion, wean off as oral meds are restarted.  -Resumed PTA carvedilol at a lower dose on 1/31/25, will increase on 2/1/25 for better rate control.  -Continue with Lipitor 20 Mg daily.  -On apixaban 5 Mg twice daily.  -TTE this is stay shows LVEF 60%, no pericardial effusion, no significant valvular disease, diastolic function indeterminate.  No WMA.  -Cardiology followed previously, signed off 1/21/25.  Consider reconsulting if rate remains difficult to control after resuming and titrating up carvedilol.  -PTA Lasix on hold, patient is anuric, managing fluid with hemodialysis.  -Monitor daily weight and intake and output as able.    Diabetes mellitus, type 2  HbA1c 6.5% on 1/17/25.  PTA is on Lantus 18 units daily and NovoLog 5 units 3 times daily.  -Currently on Lantus 15 units daily.  -ISS.  -Blood sugars fairly well controlled.  Monitor as diet is advanced and tube feedings are discontinued, will likely need to adjust insulin regimen.    ESRD on HD Tue-Thu-Sat, anuric  Anemia of chronic disease  -Nephrology following.  -Getting hemodialysis today in her room.  -Daily renal panel.  -Hemoglobin 7-8, at baseline.  -Gets EPO with hemodialysis.  -Continue sevelamer and vitamin D3.    Encephalopathy  Anxiety  Had CT head 1/20, negative for acute intracranial process.  Volume loss and chronic microvascular ischemic changes noted.  Suspected some baseline cognitive impairment.  No  "agitation.  -Continue Seroquel 25 Mg every morning and 50 Mg every afternoon.  -Continue PTA Zoloft and gabapentin.  -Delirium precautions.  -PT, OT nancy.  -Social work consult for discharge planning.    Dysphagia  Suspect due to recurrent intubation.  -Dietitian consulted.  -Continue tube feedings for now.  -Did well with video swallow study on 1/31/25, diet is being advanced.  -Consider stopping tube feedings and removing NJ tube in the near future if able to advance diet successfully/consistently.          Diet: Adult Formula Drip Feeding: Continuous Lumense Standard 1.4; Nasoduodenal tube; Goal Rate: 55; mL/hr; Change TF product to Lumense 1.4, begin at 15 mL/hr and increase every 6 hours by 20 mL to goal.  Combination Diet Soft and Bite Sized Diet (level 6); Thin Liquids (level 0) (upright, upper dentures in, straws OK)    DVT Prophylaxis: DOAC  Bradshaw Catheter: Not present  Lines: PRESENT      CVC Triple Lumen Right Internal jugular-Site Assessment: WDL  Hemodialysis Vascular Access AV fistula Superior Arm-Site Assessment: WDL;Thrill present;Bruit present      Cardiac Monitoring: None  Code Status: Full Code      Clinically Significant Risk Factors        # Hyperkalemia: Highest K = 5.7 mmol/L in last 2 days, will monitor as appropriate        # Hypoalbuminemia: Lowest albumin = 2.8 g/dL at 1/13/2025  5:14 AM, will monitor as appropriate     # Hypertension: Noted on problem list  # Chronic heart failure with preserved ejection fraction: heart failure noted on problem list and last echo with EF >50%    # Acute Hypercapnic Respiratory Failure: based on venous blood gas results.  Continue supplemental oxygen and ventilatory support as indicated.        # DMII: A1C = 6.5 % (Ref range: <5.7 %) within past 6 months   # Obesity: Estimated body mass index is 38.05 kg/m  as calculated from the following:    Height as of this encounter: 1.702 m (5' 7\").    Weight as of this encounter: 110.2 kg (242 lb 15.2 oz).    "   # Financial/Environmental Concerns: none         Social Drivers of Health    Tobacco Use: Medium Risk (9/11/2024)    Patient History     Smoking Tobacco Use: Former     Smokeless Tobacco Use: Never   Physical Activity: Insufficiently Active (7/19/2024)    Exercise Vital Sign     Days of Exercise per Week: 1 day     Minutes of Exercise per Session: 10 min   Social Connections: Unknown (7/19/2024)    Social Connection and Isolation Panel [NHANES]     Frequency of Social Gatherings with Friends and Family: More than three times a week          Disposition Plan     Medically Ready for Discharge: Anticipated in 2-4 Days             Braden Montana MD  Hospitalist Service  [facility]  Securely message with [place] (more info)  Text page via [facility] Paging/Directory   ______________________________________________________________________    Interval History   Supriya Herr was seen this morning.  Reportedly had a pretty good night, however when she requested to come off BiPAP this morning she became short of breath, tachypneic, and anxious quite quickly, symptoms improved after BiPAP was restarted.  Denies chest pain, nausea, abdominal pain.  Seems more drowsy this morning.    Physical Exam   Vital Signs: Temp: 97.5  F (36.4  C) Temp src: Axillary BP: (!) 149/67 Pulse: 93   Resp: 23 SpO2: 97 % O2 Device: BiPAP/CPAP Oxygen Delivery: 3 LPM  Weight: 242 lbs 15.15 oz    Constitutional: awake, drowsy, cooperative, no apparent distress, laying in the hospital bed with the head of the bed elevated  Respiratory: no increased work of breathing, diminished at the bases  Cardiovascular: irregular, regular rate, normal S1 and S2, systolic murmur noted  GI: normal bowel sounds, soft, non-distended, mild upper abdominal tenderness, NJ tube in place  Skin: warm, dry  Musculoskeletal: no lower extremity pitting edema present, left AKA  Neurologic: awake, drowsy, answers questions appropriately, moves all  extremities    Medical Decision Making       40 MINUTES SPENT BY ME on the date of service doing chart review, history, exam, documentation & further activities per the note.      Data     I have personally reviewed the following data over the past 24 hrs:    6.4  \   7.8 (L)   / 175     139 99 70.2 (H) /  208 (H)   5.7 (H) 30 (H) 4.05 (H) \     Ferritin:  1,278 (H) % Retic:  N/A LDH:  N/A       Imaging results reviewed over the past 24 hrs:   Recent Results (from the past 24 hours)   XR Chest Port 1 View    Narrative    EXAM: XR CHEST PORT 1 VIEW  LOCATION: [facility]  DATE: 2/1/2025    INDICATION: worsening shortness of breath and hypoxia; recent influenza A infection, history of COPD and CHF; afebrile, WBC normal  COMPARISON: 1/23/2025      Impression    IMPRESSION: Heart size and vascularity are within normal limits for technique. An endotracheal tube is removed. Right IJ line tip in the SVC. Enteric tube tip below the diaphragm and lower margin of the film. Patchy infiltrates in the right upper lobe   have developed and are likely infectious or inflammatory. Right lower lung infiltrates have improved.

## 2025-02-02 ENCOUNTER — APPOINTMENT (OUTPATIENT)
Dept: CT IMAGING | Facility: CLINIC | Age: 75
DRG: 207 | End: 2025-02-02
Attending: HOSPITALIST
Payer: COMMERCIAL

## 2025-02-02 LAB
ALBUMIN SERPL BCG-MCNC: 3.2 G/DL (ref 3.5–5.2)
ALP SERPL-CCNC: 55 U/L (ref 40–150)
ALT SERPL W P-5'-P-CCNC: 22 U/L (ref 0–50)
ANION GAP SERPL CALCULATED.3IONS-SCNC: 9 MMOL/L (ref 7–15)
AST SERPL W P-5'-P-CCNC: 12 U/L (ref 0–45)
BILIRUB DIRECT SERPL-MCNC: <0.2 MG/DL (ref 0–0.3)
BILIRUB SERPL-MCNC: 0.2 MG/DL
BUN SERPL-MCNC: 36.2 MG/DL (ref 8–23)
CALCIUM SERPL-MCNC: 9.5 MG/DL (ref 8.8–10.4)
CHLORIDE SERPL-SCNC: 99 MMOL/L (ref 98–107)
CREAT SERPL-MCNC: 2.38 MG/DL (ref 0.51–0.95)
EGFRCR SERPLBLD CKD-EPI 2021: 21 ML/MIN/1.73M2
ERYTHROCYTE [DISTWIDTH] IN BLOOD BY AUTOMATED COUNT: 17.8 % (ref 10–15)
GLUCOSE BLDC GLUCOMTR-MCNC: 158 MG/DL (ref 70–99)
GLUCOSE BLDC GLUCOMTR-MCNC: 160 MG/DL (ref 70–99)
GLUCOSE BLDC GLUCOMTR-MCNC: 166 MG/DL (ref 70–99)
GLUCOSE BLDC GLUCOMTR-MCNC: 168 MG/DL (ref 70–99)
GLUCOSE BLDC GLUCOMTR-MCNC: 179 MG/DL (ref 70–99)
GLUCOSE SERPL-MCNC: 211 MG/DL (ref 70–99)
HCO3 SERPL-SCNC: 30 MMOL/L (ref 22–29)
HCT VFR BLD AUTO: 25.3 % (ref 35–47)
HGB BLD-MCNC: 7.6 G/DL (ref 11.7–15.7)
LIPASE SERPL-CCNC: 15 U/L (ref 13–60)
MAGNESIUM SERPL-MCNC: 2.1 MG/DL (ref 1.7–2.3)
MCH RBC QN AUTO: 31.5 PG (ref 26.5–33)
MCHC RBC AUTO-ENTMCNC: 30 G/DL (ref 31.5–36.5)
MCV RBC AUTO: 105 FL (ref 78–100)
NT-PROBNP SERPL-MCNC: ABNORMAL PG/ML (ref 0–1800)
PLATELET # BLD AUTO: 170 10E3/UL (ref 150–450)
POTASSIUM SERPL-SCNC: 4.9 MMOL/L (ref 3.4–5.3)
PROCALCITONIN SERPL IA-MCNC: 3.28 NG/ML
PROT SERPL-MCNC: 5.6 G/DL (ref 6.4–8.3)
RBC # BLD AUTO: 2.41 10E6/UL (ref 3.8–5.2)
SODIUM SERPL-SCNC: 138 MMOL/L (ref 135–145)
WBC # BLD AUTO: 5.6 10E3/UL (ref 4–11)

## 2025-02-02 PROCEDURE — 74177 CT ABD & PELVIS W/CONTRAST: CPT

## 2025-02-02 PROCEDURE — 250N000013 HC RX MED GY IP 250 OP 250 PS 637: Performed by: HOSPITALIST

## 2025-02-02 PROCEDURE — 94640 AIRWAY INHALATION TREATMENT: CPT

## 2025-02-02 PROCEDURE — 80053 COMPREHEN METABOLIC PANEL: CPT | Performed by: HOSPITALIST

## 2025-02-02 PROCEDURE — 250N000009 HC RX 250: Performed by: HOSPITALIST

## 2025-02-02 PROCEDURE — 94640 AIRWAY INHALATION TREATMENT: CPT | Mod: 76

## 2025-02-02 PROCEDURE — 210N000001 HC R&B IMCU HEART CARE

## 2025-02-02 PROCEDURE — 71260 CT THORAX DX C+: CPT

## 2025-02-02 PROCEDURE — 250N000011 HC RX IP 250 OP 636: Performed by: HOSPITALIST

## 2025-02-02 PROCEDURE — 999N000157 HC STATISTIC RCP TIME EA 10 MIN

## 2025-02-02 PROCEDURE — 83735 ASSAY OF MAGNESIUM: CPT | Performed by: HOSPITALIST

## 2025-02-02 PROCEDURE — 258N000003 HC RX IP 258 OP 636: Performed by: HOSPITALIST

## 2025-02-02 PROCEDURE — 94660 CPAP INITIATION&MGMT: CPT

## 2025-02-02 PROCEDURE — 93010 ELECTROCARDIOGRAM REPORT: CPT | Performed by: INTERNAL MEDICINE

## 2025-02-02 PROCEDURE — 99233 SBSQ HOSP IP/OBS HIGH 50: CPT | Performed by: HOSPITALIST

## 2025-02-02 PROCEDURE — 83690 ASSAY OF LIPASE: CPT | Performed by: HOSPITALIST

## 2025-02-02 PROCEDURE — 80048 BASIC METABOLIC PNL TOTAL CA: CPT | Performed by: HOSPITALIST

## 2025-02-02 PROCEDURE — 85018 HEMOGLOBIN: CPT | Performed by: HOSPITALIST

## 2025-02-02 PROCEDURE — 83880 ASSAY OF NATRIURETIC PEPTIDE: CPT | Performed by: HOSPITALIST

## 2025-02-02 PROCEDURE — 84145 PROCALCITONIN (PCT): CPT | Performed by: HOSPITALIST

## 2025-02-02 PROCEDURE — 93005 ELECTROCARDIOGRAM TRACING: CPT

## 2025-02-02 PROCEDURE — 82248 BILIRUBIN DIRECT: CPT | Performed by: HOSPITALIST

## 2025-02-02 RX ORDER — IOPAMIDOL 755 MG/ML
114 INJECTION, SOLUTION INTRAVASCULAR ONCE
Status: COMPLETED | OUTPATIENT
Start: 2025-02-02 | End: 2025-02-02

## 2025-02-02 RX ORDER — QUETIAPINE FUMARATE 50 MG/1
50 TABLET, FILM COATED ORAL AT BEDTIME
Status: DISCONTINUED | OUTPATIENT
Start: 2025-02-02 | End: 2025-02-06

## 2025-02-02 RX ORDER — CARVEDILOL 25 MG/1
25 TABLET ORAL 2 TIMES DAILY WITH MEALS
Status: DISCONTINUED | OUTPATIENT
Start: 2025-02-02 | End: 2025-02-06

## 2025-02-02 RX ORDER — QUETIAPINE FUMARATE 25 MG/1
25 TABLET, FILM COATED ORAL EVERY MORNING
Status: DISCONTINUED | OUTPATIENT
Start: 2025-02-02 | End: 2025-02-06

## 2025-02-02 RX ORDER — CARVEDILOL 12.5 MG/1
12.5 TABLET ORAL ONCE
Status: COMPLETED | OUTPATIENT
Start: 2025-02-02 | End: 2025-02-02

## 2025-02-02 RX ORDER — CARVEDILOL 12.5 MG/1
12.5 TABLET ORAL 2 TIMES DAILY WITH MEALS
Status: DISCONTINUED | OUTPATIENT
Start: 2025-02-02 | End: 2025-02-02

## 2025-02-02 RX ADMIN — BUDESONIDE 1 MG: 1 INHALANT ORAL at 20:21

## 2025-02-02 RX ADMIN — INSULIN GLARGINE 15 UNITS: 100 INJECTION, SOLUTION SUBCUTANEOUS at 08:24

## 2025-02-02 RX ADMIN — CARVEDILOL 12.5 MG: 12.5 TABLET, FILM COATED ORAL at 08:23

## 2025-02-02 RX ADMIN — Medication 50 MCG: at 08:23

## 2025-02-02 RX ADMIN — QUETIAPINE FUMARATE 25 MG: 25 TABLET ORAL at 08:23

## 2025-02-02 RX ADMIN — SODIUM CHLORIDE 75 ML: 9 INJECTION, SOLUTION INTRAVENOUS at 13:55

## 2025-02-02 RX ADMIN — CETIRIZINE HYDROCHLORIDE 5 MG: 5 TABLET ORAL at 21:10

## 2025-02-02 RX ADMIN — IPRATROPIUM BROMIDE AND ALBUTEROL SULFATE 3 ML: .5; 3 SOLUTION RESPIRATORY (INHALATION) at 14:03

## 2025-02-02 RX ADMIN — IPRATROPIUM BROMIDE AND ALBUTEROL SULFATE 3 ML: .5; 3 SOLUTION RESPIRATORY (INHALATION) at 07:49

## 2025-02-02 RX ADMIN — IOPAMIDOL 114 ML: 755 INJECTION, SOLUTION INTRAVENOUS at 13:55

## 2025-02-02 RX ADMIN — TACROLIMUS: 1 OINTMENT TOPICAL at 21:18

## 2025-02-02 RX ADMIN — MICONAZOLE NITRATE: 2 POWDER TOPICAL at 08:23

## 2025-02-02 RX ADMIN — CARVEDILOL 25 MG: 25 TABLET, FILM COATED ORAL at 17:23

## 2025-02-02 RX ADMIN — AMIODARONE HYDROCHLORIDE 200 MG: 200 TABLET ORAL at 08:23

## 2025-02-02 RX ADMIN — APIXABAN 5 MG: 5 TABLET, FILM COATED ORAL at 21:10

## 2025-02-02 RX ADMIN — QUETIAPINE FUMARATE 50 MG: 50 TABLET ORAL at 21:10

## 2025-02-02 RX ADMIN — ATORVASTATIN CALCIUM 20 MG: 20 TABLET, FILM COATED ORAL at 21:10

## 2025-02-02 RX ADMIN — PANTOPRAZOLE SODIUM 40 MG: 40 INJECTION, POWDER, FOR SOLUTION INTRAVENOUS at 06:56

## 2025-02-02 RX ADMIN — MICONAZOLE NITRATE: 2 POWDER TOPICAL at 21:18

## 2025-02-02 RX ADMIN — SERTRALINE HYDROCHLORIDE 75 MG: 50 TABLET ORAL at 21:10

## 2025-02-02 RX ADMIN — APIXABAN 5 MG: 5 TABLET, FILM COATED ORAL at 08:23

## 2025-02-02 RX ADMIN — ACETAMINOPHEN 650 MG: 325 TABLET, FILM COATED ORAL at 17:23

## 2025-02-02 RX ADMIN — INSULIN GLARGINE 15 UNITS: 100 INJECTION, SOLUTION SUBCUTANEOUS at 21:10

## 2025-02-02 RX ADMIN — BUDESONIDE 1 MG: 1 INHALANT ORAL at 07:51

## 2025-02-02 RX ADMIN — IPRATROPIUM BROMIDE AND ALBUTEROL SULFATE 3 ML: .5; 3 SOLUTION RESPIRATORY (INHALATION) at 20:21

## 2025-02-02 RX ADMIN — CARVEDILOL 12.5 MG: 12.5 TABLET, FILM COATED ORAL at 10:17

## 2025-02-02 RX ADMIN — ACETAMINOPHEN 650 MG: 325 TABLET, FILM COATED ORAL at 08:38

## 2025-02-02 RX ADMIN — DILTIAZEM HYDROCHLORIDE 10 MG/HR: 5 INJECTION, SOLUTION INTRAVENOUS at 22:01

## 2025-02-02 RX ADMIN — TACROLIMUS: 1 OINTMENT TOPICAL at 08:23

## 2025-02-02 RX ADMIN — Medication 5 ML: at 08:23

## 2025-02-02 ASSESSMENT — ACTIVITIES OF DAILY LIVING (ADL)
ADLS_ACUITY_SCORE: 82
ADLS_ACUITY_SCORE: 82
ADLS_ACUITY_SCORE: 90
ADLS_ACUITY_SCORE: 90
ADLS_ACUITY_SCORE: 85
ADLS_ACUITY_SCORE: 82
ADLS_ACUITY_SCORE: 85
ADLS_ACUITY_SCORE: 90
ADLS_ACUITY_SCORE: 82
ADLS_ACUITY_SCORE: 90
ADLS_ACUITY_SCORE: 78
ADLS_ACUITY_SCORE: 78
ADLS_ACUITY_SCORE: 82
ADLS_ACUITY_SCORE: 78
ADLS_ACUITY_SCORE: 82
ADLS_ACUITY_SCORE: 90
ADLS_ACUITY_SCORE: 82
ADLS_ACUITY_SCORE: 82
ADLS_ACUITY_SCORE: 90
ADLS_ACUITY_SCORE: 90

## 2025-02-02 NOTE — PROVIDER NOTIFICATION
Message:     Good pm! Inquiring about further pain relief for this patient. Complaining of 10/10 RLQ pain and back pain.    Situation: Currently on Dialysis in the unit. PRN Tylenol given to no effect yet. Not in distress from pain but wondering if there's anything else we can give.  Background: Admitted for RF 2/2/25, Complicated stay with ICU courses and AFIB.  Assessment: VSS, but can get mildly Tachy when complaining of pain. On Diltiazem drip. On BiPAP and NG feeds.  Recommendation: Please advise. Aguila WOMACK [phone]      Provider notified: LARISA Oswald MD    Via:   [facility] Messaging      At:  2053h            Resolution/Recommendation:   No response;       Eventually with good pain control with given Tylenol

## 2025-02-02 NOTE — PROGRESS NOTES
Potassium   Date Value Ref Range Status   02/01/2025 5.7 (H) 3.4 - 5.3 mmol/L Final   02/02/2022 4.7 3.4 - 5.3 mmol/L Final   04/17/2021 4.2 3.4 - 5.3 mmol/L Final     Hemoglobin   Date Value Ref Range Status   02/01/2025 7.8 (L) 11.7 - 15.7 g/dL Final   04/16/2021 10.9 (L) 11.7 - 15.7 g/dL Final     Creatinine   Date Value Ref Range Status   02/01/2025 4.05 (H) 0.51 - 0.95 mg/dL Final   04/17/2021 5.98 (H) 0.52 - 1.04 mg/dL Final     Urea Nitrogen   Date Value Ref Range Status   02/01/2025 70.2 (H) 8.0 - 23.0 mg/dL Final   11/24/2021 37 (H) 7 - 30 mg/dL Final   04/17/2021 68 (H) 7 - 30 mg/dL Final     Sodium   Date Value Ref Range Status   02/01/2025 139 135 - 145 mmol/L Final   04/17/2021 137 133 - 144 mmol/L Final     INR   Date Value Ref Range Status   04/24/2024 1.11 0.85 - 1.15 Final   04/16/2021 1.14 0.86 - 1.14 Final      Latest Reference Range & Units 01/08/25 11:51 01/09/25 01:51   Hep B Surface Agn Nonreactive  Nonreactive    Hepatitis B Surface Antibody Instrument Value <8.5 m[IU]/mL  <3.50   Hepatitis B Surface Antibody   Nonreactive     DIALYSIS PROCEDURE NOTE  Hepatitis status of previous patient on machine log was checked and verified ok to use with this patients hepatitis status.  Patient dialyzed for 3.5 hrs. on a K2 bath with a net fluid removal of  ***L.  A BFR of 400 ml/min was obtained via a left upper-arm AV fistula using 15 gauge needles.      The treatment plan was discussed with Dr. Lim during the treatment.    Total heparin received during the treatment: 1750 units.   Needle cannulation sites held x *** min.   Meds  given: epogen     Complications: ***      Person educated: patient. Knowledge base substantial. Barriers to learning: none. Educated on procedure via verbal mode. patient/family verbalized understanding. Pt prefers verbal education style.     ICEBOAT? Timeout performed pre-treatment  I: Patient was identified using 2 identifiers  C:  Consent Signed Yes  E: Equipment  preventative maintenance is current and dialysis delivery system OK to use  B: see above  O: Dialysis orders present and complete prior to treatment  A: Vascular access verified and assessed prior to treatment  T: Treatment was performed at a clinically appropriate time  ?: Patient was allowed to ask questions and address concerns prior to treatment  See Adult Hemodialysis flowsheet in [facility] for further details and post assessment.  Machine water alarm in place and functioning. Transducer pods intact and checked every 15min.   Pt dialyzed at the bedside in room 271.  Chlorine/Chloramine water system checked every 4 hours.  Outpatient Dialysis at ***    Patient repositioned every 2 hours during the treatment.

## 2025-02-02 NOTE — PLAN OF CARE
BP stable. HR remains 105 at rest. Dilt at 10 mg/hr. PO coreg increased today. Meds given via feeding tube. Unable to be off BIPAP for more than an hour without tachypnea and resp ditress. Belly pain treated with tylenol. CT chest abd pelvis, awaiting results. Turn/repo. Incont small amount of stool x3. Urine retention required straight cath, 700 mls of bhargavi urine returned. Cooperative most of the day

## 2025-02-02 NOTE — PROGRESS NOTES
Heart Failure Care Map  GOALS TO BE MET BEFORE DISCHARGE:    1. Decrease congestion and/or edema with diuretic therapy to achieve near optimal volume status.     Dyspnea improved: No, further care required to meet this goal. Please explain On Bipap Overnight   Edema improved: Yes, satisfactory for discharge.        Last 24 hour I/O:   Intake/Output Summary (Last 24 hours) at 2/2/2025 0704  Last data filed at 2/2/2025 0700  Gross per 24 hour   Intake 2210 ml   Output 3000 ml   Net -790 ml           Net I/O and Weights since admission:   01/03 1500 - 02/02 1459  In: 49461.12 [P.O.:540; I.V.:94856.12]  Out: 27221   Net: 38782.12     Vitals:    01/08/25 0727 01/08/25 1942 01/09/25 0743 01/09/25 0958   Weight: 119 kg (262 lb 5.6 oz) 96.8 kg (213 lb 6.5 oz) 95.6 kg (210 lb 12.2 oz) 96.3 kg (212 lb 4.9 oz)    01/10/25 0400 01/11/25 0000 01/12/25 0000 01/13/25 0348   Weight: 98.4 kg (216 lb 14.9 oz) 96.5 kg (212 lb 11.9 oz) 97.3 kg (214 lb 8.1 oz) 98.8 kg (217 lb 13 oz)    01/14/25 0500 01/15/25 0100 01/16/25 0300 01/17/25 0600   Weight: 99.4 kg (219 lb 2.2 oz) 99.4 kg (219 lb 2.2 oz) 101.8 kg (224 lb 6.9 oz) 100.7 kg (222 lb 0.1 oz)    01/18/25 0200 01/19/25 0400 01/20/25 0500 01/21/25 0400   Weight: 101.2 kg (223 lb 1.7 oz) 99.3 kg (218 lb 14.7 oz) 100.4 kg (221 lb 5.5 oz) 96 kg (211 lb 10.3 oz)    01/23/25 0000 01/24/25 0200 01/25/25 0500 01/26/25 0300   Weight: 101 kg (222 lb 10.6 oz) 102 kg (224 lb 13.9 oz) 102.8 kg (226 lb 10.1 oz) 102 kg (224 lb 13.9 oz)    01/27/25 0200 01/28/25 0300 01/29/25 0500 01/30/25 0600   Weight: 106.5 kg (234 lb 12.6 oz) 103.9 kg (229 lb 0.9 oz) 99.7 kg (219 lb 12.8 oz) 102.8 kg (226 lb 10.1 oz)    02/01/25 0600 02/02/25 0600   Weight: 110.2 kg (242 lb 15.2 oz) 103.5 kg (228 lb 2.8 oz)       2.  O2 sats > 90% on room air, or at prior home O2 therapy level.      Able to wean O2 this shift to keep sats above 90%?: No, further care required to meet this goal. Please explain BiPAP  dependent   Does patient use Home O2? No          Current oxygenation status:   SpO2: 97 %     O2 Device: BiPAP/CPAP, Oxygen Delivery: 8 LPM    3.  Tolerates ambulation and mobility near baseline.     Ambulation: No, further care required to meet this goal. Please explain bedrest overnight   Times patient ambulated with staff this shift: 0    Please review the Heart Failure Care Map for additional HF goal outcomes.    Gerri Hallman RN  2/2/2025

## 2025-02-02 NOTE — PLAN OF CARE
VS:  Stable  Assist by: 2; Hoist/Lift transfer and bed mobility  Cardiac:  Irregular R&R / CP free overnight   Neuro: A&Ox3-4  CMS: Denies paresthesias    Respi: diminished to auscultation off BiPAP; on Oxymask at 8LPM for a couple of hours overnight   : Anuris; S/P HD run 2/1/25; 3L out  GI: Abdo obese but soft, non-tender; BS audible; Last TERE 2/2/25    Strip/Tele: AFIB RVR    Pressure areas/Skin:    - PA's intact    - drying rashes to skin folds    - Blanchable redness to Sacrum/coccyx; no open area --- Foam dressing applied            LDA's:      - R IJ ongoing Diltiazem gtt   - L AVF, (+) Thrill and Bruit   - NG Tube at L Nostril; Secured with Bridle -- on [place] 1.4 feeds at 55mL/hr    Plans:    > start empiric antibiotics.   > +/- ID consult    > Continue tube feedings for now      > Cardio:              - signed off 1/21/25.        Problem: Adult Inpatient Plan of Care  Goal: Absence of Hospital-Acquired Illness or Injury  Intervention: Prevent Skin Injury  Recent Flowsheet Documentation  Taken 2/2/2025 0100 by Gerri Hallman RN  Body Position:   turned   right   side-lying 30 degrees     Problem: Gas Exchange Impaired  Goal: Optimal Gas Exchange  Intervention: Optimize Oxygenation and Ventilation  Recent Flowsheet Documentation  Taken 2/2/2025 0100 by Gerri Hallman RN  Head of Bed (HOB) Positioning: HOB at 30-45 degrees     Problem: Restraint, Nonviolent  Goal: Absence of Harm or Injury  Intervention: Protect Skin and Joint Integrity  Recent Flowsheet Documentation  Taken 2/2/2025 0100 by Gerri Hallman, RN  Body Position:   turned   right   side-lying 30 degrees     Problem: Mechanical Ventilation Invasive  Goal: Absence of Ventilator-Induced Lung Injury  Intervention: Prevent Ventilator-Associated Pneumonia  Recent Flowsheet Documentation  Taken 2/2/2025 0100 by Gerir Hallman, RN  Head of Bed (HOB) Positioning: HOB at 30-45 degrees

## 2025-02-02 NOTE — PROGRESS NOTES
[facility]    Medicine Progress Note - Hospitalist Service    Date of Admission:  1/8/2025    Assessment & Plan   Supriya Herr is a 74 year old female with PMHx significant for ESRD on HD, CHF, COPD, poor mobility (Lt AKA, Obesity, WC bound), DM type II, hypertension.  She was brought to the ER by EMS for evaluation of shortness of breath, diagnosed with influenza A and admitted on 1/8/25      Hospital course summary  Patient was admitted, subsequently intubated for worsening respiratory failure and remained on mechanical ventilator 1/9 - 1/18.  She was reintubated and remained on mechanical ventilator 1/20 - 1/25.  Was treated with Tamiflu, IV antibiotic, steroid, has completed courses.  Hospital course complicated by A-fib RVR.  Was on amiodarone drip with eventual transition to p.o. Also required diltiazem drip for rate control.  This was weaned off.  Required BiPAP--HFNC--intermittent BiPAP use.  Ongoing tube feeding and modified diet per SLP.  Delirium managed with Seroquel.    Acute hypoxic respiratory failure  Influenza A pneumonia  Acute COPD exacerbation  Hospital-acquired pneumonia  Pharyngeal edema  Initial presentation with shortness of breath, diagnosed influenza A. Initial course as noted above.  -Completed Tamiflu.  -Had bronchoscopy and BAL 1/20, culture grew actinomyces, CT chest showed RML infiltrate versus atelectasis but no sign of actinomycosis per intensivist and felt this was likely colonization.  Pneumonia treated with cefepime and vancomycin, cefepime changed to Levaquin and completed antibiotic course 1/26.  -Was treated with IV steroid, completed course.  -Continue nebs-budesonide 1 Mg twice daily, DuoNeb 4 times daily, as needed albuterol.  -Weaned down to oxymask on 1/30/25.  Still requiring intermittent BiPAP.      Abdominal pain  2/2: Obtaining CT CAP for further evaluation.  Normal LFTs and lipase.    Possible right upper lobe pneumonia, 2/1  Increased  hypoxia and shortness of breath on the morning of 2/1/25.  WBC within normal limits.  Afebrile.  VBG OK.  -Concern for RUL pneumonia, started empiric antibiotics-vancomycin and levofloxacin.  Repeat blood cultures on 2/1 NGTD.  Stop antibiotics if no convincing evidence of pneumonia.  Procalcitonin returned elevated at 3.28 on 2/2.  However no prior baseline this hospitalization.  -Consider ID consult pending clinical course.    Atrial fibrillation with RVR  Acute on chronic CHFpEF  Hypertension  Shock, due to sedation  PTA is on amlodipine 5 Mg daily, Coreg 25 Mg p.o. twice daily and Lasix.  -Was started on amiodarone drip, transitioned to p.o. on 1/27/25 with plan for a 14 day course.  -Wean off diltiazem infusion as able  -Resumed PTA carvedilol at a lower dose on 1/31/25, increased back to PTA dosing of 25 Mg twice daily on 2/2  -Continue with Lipitor 20 Mg daily.  -On apixaban 5 Mg twice daily.  -TTE this is stay shows LVEF 60%, no pericardial effusion, no significant valvular disease, diastolic function indeterminate.  No WMA.  -Cardiology followed previously, signed off 1/21/25.  Consider reconsulting if rate remains difficult to control after resuming and titrating up carvedilol.  -PTA Lasix on hold, patient is anuric, managing fluid with hemodialysis.  Last BNP was around 50K on 1/20.  Recheck BNP on 2/2 around 41K.  -Monitor daily weight and intake and output as able.    Diabetes mellitus, type 2  HbA1c 6.5% on 1/17/25.  PTA is on Lantus 18 units daily and NovoLog 5 units 3 times daily.  -Currently on Lantus 15 units daily.  -ISS.  -Blood sugars fairly well controlled.  Monitor as diet is advanced and tube feedings are discontinued, will likely need to adjust insulin regimen.    ESRD on HD Tue-Thu-Sat, anuric  Anemia of chronic disease  -Nephrology following-managing dialysis needs  -Next dialysis on 2/4.  Apparently patient was dyspneic even after dialysis the previous night and needed to be put back on  BiPAP.  -Daily renal panel.  -Hemoglobin 7-8, at baseline.  -Gets EPO with hemodialysis.  -Continue sevelamer and vitamin D3.    Encephalopathy  Anxiety  Had CT head 1/20, negative for acute intracranial process.  Volume loss and chronic microvascular ischemic changes noted.  Suspected some baseline cognitive impairment.  No agitation.  -Continue Seroquel 25 Mg every morning and 50 Mg every afternoon.  -Continue PTA Zoloft and gabapentin.  -Delirium precautions.  -PT, OT eval.  -Social work consult for discharge planning.    Dysphagia  Suspect due to recurrent intubation.  -Dietitian consulted.  -Continue tube feedings for now.  -Did well with video swallow study on 1/31/25, diet is being advanced.  -Consider stopping tube feedings and removing NJ tube in the near future if able to advance diet successfully/consistently.          Diet: Adult Formula Drip Feeding: Continuous [employer] Standard 1.4; Nasoduodenal tube; Goal Rate: 55; mL/hr; Change TF product to [employer] 1.4, begin at 15 mL/hr and increase every 6 hours by 20 mL to goal.  Combination Diet Soft and Bite Sized Diet (level 6); Thin Liquids (level 0) (upright, upper dentures in, straws OK)    DVT Prophylaxis: DOAC  Bradshaw Catheter: Not present  Lines: PRESENT      CVC Triple Lumen Right Internal jugular-Site Assessment: WDL  Hemodialysis Vascular Access AV fistula Superior Arm-Site Assessment: WDL;Thrill present;Bruit present      Cardiac Monitoring: None  Code Status: Full Code      Clinically Significant Risk Factors        # Hyperkalemia: Highest K = 5.7 mmol/L in last 2 days, will monitor as appropriate        # Hypoalbuminemia: Lowest albumin = 2.8 g/dL at 1/13/2025  5:14 AM, will monitor as appropriate     # Hypertension: Noted on problem list    # Chronic heart failure with preserved ejection fraction: heart failure noted on problem list and last echo with EF >50%    # Acute Hypercapnic Respiratory Failure: based on venous blood gas results.   "Continue supplemental oxygen and ventilatory support as indicated.        # DMII: A1C = 6.5 % (Ref range: <5.7 %) within past 6 months   # Obesity: Estimated body mass index is 35.74 kg/m  as calculated from the following:    Height as of this encounter: 1.702 m (5' 7\").    Weight as of this encounter: 103.5 kg (228 lb 2.8 oz).        # Financial/Environmental Concerns: none         Social Drivers of Health    Tobacco Use: Medium Risk (9/11/2024)    Patient History     Smoking Tobacco Use: Former     Smokeless Tobacco Use: Never   Physical Activity: Insufficiently Active (7/19/2024)    Exercise Vital Sign     Days of Exercise per Week: 1 day     Minutes of Exercise per Session: 10 min   Social Connections: Unknown (7/19/2024)    Social Connection and Isolation Panel [NHANES]     Frequency of Social Gatherings with Friends and Family: More than three times a week          Disposition Plan     Medically Ready for Discharge: Anticipated in 2-4 Days pending improvement in respiratory status             Liset Romero MD  Hospitalist Service  [facility]  Securely message with [employer] (more info)  Text page via [employer] Paging/Directory   ______________________________________________________________________    Interval History   Patient still requires BiPAP.  2 days ago she tolerated being off of it for about 8 hours.  However since yesterday she is not tolerating being off of it well.  She gets short of breath and dyspneic and BiPAP helps her symptoms.  She was short of breath even soon after dialysis and pulling fluid out.  This morning she is resting in bed, wearing BiPAP.  Discussed with RN at bedside.  She is answering some simple questions.    Physical Exam   Vital Signs: Temp: 97.9  F (36.6  C) Temp src: Axillary BP: 133/45 Pulse: 82   Resp: 21 SpO2: 97 % O2 Device: BiPAP/CPAP Oxygen Delivery: 8 LPM  Weight: 228 lbs 2.82 oz    Constitutional: awake, drowsy, cooperative, no apparent distress, " laying in the hospital bed wearing BiPAP  Respiratory: no increased work of breathing, diminished at the bases, BiPAP in use  Cardiovascular: irregular, regular rate, normal S1 and S2, systolic murmur noted  GI: normal bowel sounds, soft, non-distended, mild upper abdominal tenderness, NJ tube in place  Skin: warm, dry  Musculoskeletal: no lower extremity pitting edema present, left AKA  Neurologic: awake, drowsy, moving all extremities    Medical Decision Making       55 MINUTES SPENT BY ME on the date of service doing chart review, history, exam, documentation & further activities per the note.      Data     I have personally reviewed the following data over the past 24 hrs:    5.6  \   7.6 (L)   / 170     138 99 36.2 (H) /  166 (H)   4.9 30 (H) 2.38 (H) \     Ferritin:  N/A % Retic:  N/A LDH:  N/A       Imaging results reviewed over the past 24 hrs:   Recent Results (from the past 24 hours)   XR Chest Port 1 View    Narrative    EXAM: XR CHEST PORT 1 VIEW  LOCATION: [facility]  DATE: 2/1/2025    INDICATION: worsening shortness of breath and hypoxia; recent influenza A infection, history of COPD and CHF; afebrile, WBC normal  COMPARISON: 1/23/2025      Impression    IMPRESSION: Heart size and vascularity are within normal limits for technique. An endotracheal tube is removed. Right IJ line tip in the SVC. Enteric tube tip below the diaphragm and lower margin of the film. Patchy infiltrates in the right upper lobe   have developed and are likely infectious or inflammatory. Right lower lung infiltrates have improved.

## 2025-02-02 NOTE — PHARMACY-ADMISSION MEDICATION HISTORY
Pharmacy Vancomycin Initial Note  Date of Service 2025  Patient's  1950  75 year old, female    Indication: [facility]-Associated Pneumonia    Current estimated CrCl = Estimated Creatinine Clearance: 15.1 mL/min (A) (based on SCr of 4.05 mg/dL (H)).    Creatinine for last 3 days  2025:  5:40 AM Creatinine 3.65 mg/dL  2025:  6:07 AM Creatinine 2.90 mg/dL  2025:  5:53 AM Creatinine 4.05 mg/dL    Recent Vancomycin Level(s) for last 3 days  No results found for requested labs within last 3 days.      Vancomycin IV Administrations (past 72 hours)        No vancomycin orders with administrations in past 72 hours.                    Nephrotoxins and other renal medications (From now, onward)      Start     Dose/Rate Route Frequency Ordered Stop    25 2200  vancomycin (VANCOCIN) 2,000 mg in 0.9% NaCl 520 mL intermittent infusion         2,000 mg  over 2 Hours Intravenous ONCE 25 20425 204  vancomycin place cardoso - receiving intermittent dosing         1 each Intravenous SEE ADMIN INSTRUCTIONS 25              Contrast Orders - past 72 hours (72h ago, onward)      Start     Dose/Rate Route Frequency Stop    25 1500  barium sulfate (VARIBAR) 40 % pudding/paste 10 mL         10 mL Oral ONCE 25 1447    25 1500  barium sulfate (VARIBAR THIN Liquid) 40 % oral suspension 24 g         60 mL Oral ONCE 25 1447                Plan:  Start vancomycin 2000 mg IV x1, intermittent dosing.   Vancomycin monitoring method: Trough (Method 2 = manual dose calculation)  Vancomycin therapeutic monitoring goal: 15-20 mg/L  Pharmacy will check vancomycin levels as appropriate in 1-3 Days.    Serum creatinine levels will be ordered daily for the first week of therapy and at least twice weekly for subsequent weeks.      Akil Aliheyder, [facility]

## 2025-02-03 ENCOUNTER — APPOINTMENT (OUTPATIENT)
Dept: PHYSICAL THERAPY | Facility: CLINIC | Age: 75
DRG: 207 | End: 2025-02-03
Attending: HOSPITALIST
Payer: COMMERCIAL

## 2025-02-03 LAB
ANION GAP SERPL CALCULATED.3IONS-SCNC: 8 MMOL/L (ref 7–15)
BACTERIA BRONCH: NORMAL
BUN SERPL-MCNC: 58.9 MG/DL (ref 8–23)
CALCIUM SERPL-MCNC: 9.8 MG/DL (ref 8.8–10.4)
CHLORIDE SERPL-SCNC: 99 MMOL/L (ref 98–107)
CREAT SERPL-MCNC: 3.49 MG/DL (ref 0.51–0.95)
EGFRCR SERPLBLD CKD-EPI 2021: 13 ML/MIN/1.73M2
GLUCOSE BLDC GLUCOMTR-MCNC: 147 MG/DL (ref 70–99)
GLUCOSE BLDC GLUCOMTR-MCNC: 154 MG/DL (ref 70–99)
GLUCOSE BLDC GLUCOMTR-MCNC: 163 MG/DL (ref 70–99)
GLUCOSE BLDC GLUCOMTR-MCNC: 170 MG/DL (ref 70–99)
GLUCOSE BLDC GLUCOMTR-MCNC: 202 MG/DL (ref 70–99)
GLUCOSE BLDC GLUCOMTR-MCNC: 219 MG/DL (ref 70–99)
GLUCOSE SERPL-MCNC: 158 MG/DL (ref 70–99)
HCO3 SERPL-SCNC: 29 MMOL/L (ref 22–29)
MAGNESIUM SERPL-MCNC: 2.2 MG/DL (ref 1.7–2.3)
MRSA DNA SPEC QL NAA+PROBE: NEGATIVE
PHOSPHATE SERPL-MCNC: 2.6 MG/DL (ref 2.5–4.5)
POTASSIUM SERPL-SCNC: 5.7 MMOL/L (ref 3.4–5.3)
SA TARGET DNA: NEGATIVE
SODIUM SERPL-SCNC: 136 MMOL/L (ref 135–145)

## 2025-02-03 PROCEDURE — 87641 MR-STAPH DNA AMP PROBE: CPT | Performed by: SPECIALIST

## 2025-02-03 PROCEDURE — 250N000013 HC RX MED GY IP 250 OP 250 PS 637: Performed by: HOSPITALIST

## 2025-02-03 PROCEDURE — 87486 CHLMYD PNEUM DNA AMP PROBE: CPT | Performed by: SPECIALIST

## 2025-02-03 PROCEDURE — 250N000009 HC RX 250: Performed by: HOSPITALIST

## 2025-02-03 PROCEDURE — 999N000157 HC STATISTIC RCP TIME EA 10 MIN

## 2025-02-03 PROCEDURE — 82310 ASSAY OF CALCIUM: CPT | Performed by: HOSPITALIST

## 2025-02-03 PROCEDURE — 83735 ASSAY OF MAGNESIUM: CPT | Performed by: HOSPITALIST

## 2025-02-03 PROCEDURE — 94660 CPAP INITIATION&MGMT: CPT

## 2025-02-03 PROCEDURE — 99233 SBSQ HOSP IP/OBS HIGH 50: CPT | Performed by: HOSPITALIST

## 2025-02-03 PROCEDURE — 80048 BASIC METABOLIC PNL TOTAL CA: CPT | Performed by: HOSPITALIST

## 2025-02-03 PROCEDURE — 258N000003 HC RX IP 258 OP 636: Performed by: HOSPITALIST

## 2025-02-03 PROCEDURE — 250N000011 HC RX IP 250 OP 636: Performed by: INTERNAL MEDICINE

## 2025-02-03 PROCEDURE — 94640 AIRWAY INHALATION TREATMENT: CPT | Mod: 76

## 2025-02-03 PROCEDURE — 250N000011 HC RX IP 250 OP 636: Performed by: HOSPITALIST

## 2025-02-03 PROCEDURE — 82565 ASSAY OF CREATININE: CPT | Performed by: HOSPITALIST

## 2025-02-03 PROCEDURE — 97530 THERAPEUTIC ACTIVITIES: CPT | Mod: GP | Performed by: PHYSICAL THERAPIST

## 2025-02-03 PROCEDURE — 94640 AIRWAY INHALATION TREATMENT: CPT

## 2025-02-03 PROCEDURE — 87640 STAPH A DNA AMP PROBE: CPT | Performed by: SPECIALIST

## 2025-02-03 PROCEDURE — 250N000013 HC RX MED GY IP 250 OP 250 PS 637: Performed by: INTERNAL MEDICINE

## 2025-02-03 PROCEDURE — 87581 M.PNEUMON DNA AMP PROBE: CPT | Performed by: SPECIALIST

## 2025-02-03 PROCEDURE — 99232 SBSQ HOSP IP/OBS MODERATE 35: CPT | Performed by: INTERNAL MEDICINE

## 2025-02-03 PROCEDURE — 99222 1ST HOSP IP/OBS MODERATE 55: CPT | Performed by: SPECIALIST

## 2025-02-03 PROCEDURE — 84100 ASSAY OF PHOSPHORUS: CPT | Performed by: HOSPITALIST

## 2025-02-03 PROCEDURE — 210N000001 HC R&B IMCU HEART CARE

## 2025-02-03 RX ORDER — DILTIAZEM HCL 60 MG
60 TABLET ORAL EVERY 6 HOURS SCHEDULED
Status: DISCONTINUED | OUTPATIENT
Start: 2025-02-03 | End: 2025-02-10

## 2025-02-03 RX ORDER — BISACODYL 10 MG
10 SUPPOSITORY, RECTAL RECTAL DAILY PRN
Status: DISCONTINUED | OUTPATIENT
Start: 2025-02-03 | End: 2025-02-26 | Stop reason: HOSPADM

## 2025-02-03 RX ORDER — HEPARIN SODIUM,PORCINE 10 UNIT/ML
5-20 VIAL (ML) INTRAVENOUS
Status: DISCONTINUED | OUTPATIENT
Start: 2025-02-03 | End: 2025-02-14 | Stop reason: ALTCHOICE

## 2025-02-03 RX ORDER — POLYETHYLENE GLYCOL 3350 17 G/17G
17 POWDER, FOR SOLUTION ORAL DAILY
Status: DISCONTINUED | OUTPATIENT
Start: 2025-02-03 | End: 2025-02-26 | Stop reason: HOSPADM

## 2025-02-03 RX ORDER — FUROSEMIDE 10 MG/ML
80 INJECTION INTRAMUSCULAR; INTRAVENOUS ONCE
Status: COMPLETED | OUTPATIENT
Start: 2025-02-03 | End: 2025-02-03

## 2025-02-03 RX ORDER — HEPARIN SODIUM,PORCINE 10 UNIT/ML
5-20 VIAL (ML) INTRAVENOUS EVERY 24 HOURS
Status: DISCONTINUED | OUTPATIENT
Start: 2025-02-03 | End: 2025-02-14 | Stop reason: ALTCHOICE

## 2025-02-03 RX ADMIN — Medication 5 ML: at 16:19

## 2025-02-03 RX ADMIN — CARVEDILOL 25 MG: 25 TABLET, FILM COATED ORAL at 17:50

## 2025-02-03 RX ADMIN — CETIRIZINE HYDROCHLORIDE 5 MG: 5 TABLET ORAL at 20:24

## 2025-02-03 RX ADMIN — DILTIAZEM HYDROCHLORIDE 10 MG/HR: 5 INJECTION, SOLUTION INTRAVENOUS at 11:19

## 2025-02-03 RX ADMIN — POLYETHYLENE GLYCOL 3350 17 G: 17 POWDER, FOR SOLUTION ORAL at 11:19

## 2025-02-03 RX ADMIN — QUETIAPINE FUMARATE 25 MG: 25 TABLET ORAL at 09:55

## 2025-02-03 RX ADMIN — SEVELAMER CARBONATE 800 MG: 800 TABLET, FILM COATED ORAL at 17:50

## 2025-02-03 RX ADMIN — SEVELAMER CARBONATE 800 MG: 800 TABLET, FILM COATED ORAL at 09:55

## 2025-02-03 RX ADMIN — BUDESONIDE 1 MG: 1 INHALANT ORAL at 07:31

## 2025-02-03 RX ADMIN — INSULIN GLARGINE 15 UNITS: 100 INJECTION, SOLUTION SUBCUTANEOUS at 21:33

## 2025-02-03 RX ADMIN — SEVELAMER CARBONATE 800 MG: 800 TABLET, FILM COATED ORAL at 13:06

## 2025-02-03 RX ADMIN — Medication 5 ML: at 21:34

## 2025-02-03 RX ADMIN — QUETIAPINE FUMARATE 50 MG: 50 TABLET ORAL at 20:24

## 2025-02-03 RX ADMIN — Medication 5 ML: at 09:55

## 2025-02-03 RX ADMIN — INSULIN GLARGINE 15 UNITS: 100 INJECTION, SOLUTION SUBCUTANEOUS at 09:56

## 2025-02-03 RX ADMIN — Medication 5 ML: at 21:33

## 2025-02-03 RX ADMIN — FUROSEMIDE 80 MG: 10 INJECTION, SOLUTION INTRAMUSCULAR; INTRAVENOUS at 16:19

## 2025-02-03 RX ADMIN — IPRATROPIUM BROMIDE AND ALBUTEROL SULFATE 3 ML: .5; 3 SOLUTION RESPIRATORY (INHALATION) at 07:31

## 2025-02-03 RX ADMIN — SERTRALINE HYDROCHLORIDE 75 MG: 50 TABLET ORAL at 20:23

## 2025-02-03 RX ADMIN — SENNOSIDES 10 ML: 8.8 LIQUID ORAL at 13:05

## 2025-02-03 RX ADMIN — BUDESONIDE 1 MG: 1 INHALANT ORAL at 19:31

## 2025-02-03 RX ADMIN — ATORVASTATIN CALCIUM 20 MG: 20 TABLET, FILM COATED ORAL at 20:23

## 2025-02-03 RX ADMIN — IPRATROPIUM BROMIDE AND ALBUTEROL SULFATE 3 ML: .5; 3 SOLUTION RESPIRATORY (INHALATION) at 11:21

## 2025-02-03 RX ADMIN — LEVOFLOXACIN 500 MG: 500 INJECTION, SOLUTION INTRAVENOUS at 18:32

## 2025-02-03 RX ADMIN — CARVEDILOL 25 MG: 25 TABLET, FILM COATED ORAL at 09:55

## 2025-02-03 RX ADMIN — DILTIAZEM HYDROCHLORIDE 60 MG: 30 TABLET, FILM COATED ORAL at 20:23

## 2025-02-03 RX ADMIN — Medication 5 ML: at 13:06

## 2025-02-03 RX ADMIN — TACROLIMUS: 1 OINTMENT TOPICAL at 20:25

## 2025-02-03 RX ADMIN — SODIUM ZIRCONIUM CYCLOSILICATE 10 G: 5 POWDER, FOR SUSPENSION ORAL at 16:23

## 2025-02-03 RX ADMIN — AMIODARONE HYDROCHLORIDE 200 MG: 200 TABLET ORAL at 09:55

## 2025-02-03 RX ADMIN — MICONAZOLE NITRATE: 2 POWDER TOPICAL at 20:24

## 2025-02-03 RX ADMIN — APIXABAN 5 MG: 5 TABLET, FILM COATED ORAL at 09:55

## 2025-02-03 RX ADMIN — TACROLIMUS: 1 OINTMENT TOPICAL at 09:56

## 2025-02-03 RX ADMIN — IPRATROPIUM BROMIDE AND ALBUTEROL SULFATE 3 ML: .5; 3 SOLUTION RESPIRATORY (INHALATION) at 19:31

## 2025-02-03 RX ADMIN — IPRATROPIUM BROMIDE AND ALBUTEROL SULFATE 3 ML: .5; 3 SOLUTION RESPIRATORY (INHALATION) at 15:08

## 2025-02-03 RX ADMIN — MICONAZOLE NITRATE: 2 POWDER TOPICAL at 09:56

## 2025-02-03 RX ADMIN — Medication 50 MCG: at 09:55

## 2025-02-03 RX ADMIN — APIXABAN 5 MG: 5 TABLET, FILM COATED ORAL at 20:24

## 2025-02-03 RX ADMIN — PANTOPRAZOLE SODIUM 40 MG: 40 INJECTION, POWDER, FOR SOLUTION INTRAVENOUS at 11:18

## 2025-02-03 ASSESSMENT — ACTIVITIES OF DAILY LIVING (ADL)
ADLS_ACUITY_SCORE: 82
ADLS_ACUITY_SCORE: 82
ADLS_ACUITY_SCORE: 86
ADLS_ACUITY_SCORE: 82
ADLS_ACUITY_SCORE: 82
ADLS_ACUITY_SCORE: 86
ADLS_ACUITY_SCORE: 86
ADLS_ACUITY_SCORE: 80
ADLS_ACUITY_SCORE: 84
ADLS_ACUITY_SCORE: 86
ADLS_ACUITY_SCORE: 86
ADLS_ACUITY_SCORE: 82
ADLS_ACUITY_SCORE: 86
ADLS_ACUITY_SCORE: 84
ADLS_ACUITY_SCORE: 82
ADLS_ACUITY_SCORE: 86
ADLS_ACUITY_SCORE: 82
ADLS_ACUITY_SCORE: 84
ADLS_ACUITY_SCORE: 82
ADLS_ACUITY_SCORE: 82

## 2025-02-03 NOTE — PLAN OF CARE
A&O x4 forgetful. Pt denied pain. VSS, off bipap on 2L oxymask. Up w/ lift to the chair. Tele: A fib RVR/CVR 's- on dilt gtt @ 10.  TF @ goal of 55 through NG. Pt did take a few bites of mac and cheese this afternoon and tolerated it well. BM post miralax/senna. Dialysis tomorrow. Alarms on. Continue to monitor.
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
## 2025-02-03 NOTE — PROGRESS NOTES
Renal Medicine Progress Note            Assessment/Plan:     75 y.o woman with ESRD, admitted for respiratory distress due to influenza A infection.      # ESRD:               -TTS               -NICOLEF               -Dr. Basilio               -[facility]                 # Influenza A:               -finished Tamiflu     # FEN: Mild hypekalemia.     # Anemia: Hgb is below target but acceptable.                -epo with HD     Plan:  # Hemodialysis tomorrow  # Lokelma 10 g today   # renal diet-on low potassium tube feed  # PTA Lasix 80 mg twice daily on nondialysis day which has been on hold.  Give 80 mg IV now.  Can resume 80 mg daily on nondialysis days orally  # Venofer 200 mg IV          Interval History:     She is off BiPAP.  Breathing better.  In good mood.  Denies shortness of breath  K is 5.7         Medications and Allergies:     Current Facility-Administered Medications   Medication Dose Route Frequency Provider Last Rate Last Admin    - MEDICATION INSTRUCTIONS for Dialysis Patients -   Does not apply See Admin Instructions Braden Montana MD        amiodarone (PACERONE) tablet 200 mg  200 mg Oral or FT or NG tube Daily Denver Shipman MD   200 mg at 02/03/25 0955    apixaban ANTICOAGULANT (ELIQUIS) tablet 5 mg  5 mg Oral or Feeding Tube BID Denver Shipman MD   5 mg at 02/03/25 0955    atorvastatin (LIPITOR) tablet 20 mg  20 mg Oral or Feeding Tube QPM Denver Shipman MD   20 mg at 02/02/25 2110    B and C vitamin Complex with folic acid (NEPHRONEX) liquid 5 mL  5 mL Per Feeding Tube Daily Denver Shipman MD   5 mL at 02/03/25 0955    budesonide (PULMICORT) neb solution 1 mg  1 mg Nebulization BID Denver hSipman MD   1 mg at 02/03/25 0731    carvedilol (COREG) tablet 25 mg  25 mg Oral or Feeding Tube BID w/meals OvLiset andrade MD   25 mg at 02/03/25 0955    cetirizine (zyrTEC) tablet 5 mg  5 mg Oral or Feeding Tube At Bedtime Denvre Shipman MD   5 mg at 02/02/25 2110     heparin lock flush 10 unit/mL injection 5-20 mL  5-20 mL Intracatheter Q24H Liset Romero MD   5 mL at 02/03/25 1306    insulin aspart (NovoLOG) injection (RAPID ACTING)  1-12 Units Subcutaneous Q4H Denver Shipman MD   2 Units at 02/03/25 1348    insulin glargine (LANTUS PEN) injection 15 Units  15 Units Subcutaneous BID Denver Shipman MD   15 Units at 02/03/25 0956    ipratropium - albuterol 0.5 mg/2.5 mg/3 mL (DUONEB) neb solution 3 mL  3 mL Nebulization 4x daily Denver Shipman MD   3 mL at 02/03/25 1121    levofloxacin (LEVAQUIN) infusion 500 mg  500 mg Intravenous Q48H Braden Montana MD        miconazole (MICATIN) 2 % powder   Topical BID Denver Shipman MD   Given at 02/03/25 0956    pantoprazole (PROTONIX) 2 mg/mL suspension 40 mg  40 mg Per Feeding Tube QAM  Denver Shipman MD   40 mg at 01/30/25 1203    Or    pantoprazole (PROTONIX) IV push injection 40 mg  40 mg Intravenous QA Denver Mays MD   40 mg at 02/03/25 1118    polyethylene glycol (MIRALAX) Packet 17 g  17 g Oral or Feeding Tube Daily Liset Romero MD   17 g at 02/03/25 1119    QUEtiapine (SEROquel) tablet 25 mg  25 mg Oral or Feeding Tube QA Denver Shipman MD   25 mg at 02/03/25 0955    QUEtiapine (SEROquel) tablet 50 mg  50 mg Oral or Feeding Tube At Bedtime Denver Shipman MD   50 mg at 02/02/25 2110    sennosides (SENOKOT) syrup 10 mL  10 mL Oral or Feeding Tube BID Liset Romero MD   10 mL at 02/03/25 1305    sertraline (ZOLOFT) tablet 75 mg  75 mg Oral or Feeding Tube QPM Denver Shipman MD   75 mg at 02/02/25 2110    sevelamer carbonate (RENVELA) tablet 800 mg  800 mg Oral TID w/meals Denver Shipman MD   800 mg at 02/03/25 1306    tacrolimus (PROTOPIC) 0.1 % ointment   Topical BID Denver Shipman MD   Given at 02/03/25 0956    vitamin D3 (CHOLECALCIFEROL) tablet 50 mcg  50 mcg Oral or Feeding Tube Daily Denver Shipman MD   50 mcg at 02/03/25 0901        Allergies  "  Allergen Reactions    Ampicillin-Sulbactam Sodium Rash     No evidence SJS, but very uncomfortable and precipitated multiple provider visits. Would not use penicillins again if other options available.     Penicillins Rash            Physical Exam:   Vitals were reviewed   , Blood pressure 125/60, pulse 91, temperature 98.3  F (36.8  C), temperature source Axillary, resp. rate 18, height 1.702 m (5' 7\"), weight 105.8 kg (233 lb 4 oz), SpO2 96%, not currently breastfeeding.    Wt Readings from Last 3 Encounters:   02/03/25 105.8 kg (233 lb 4 oz)   06/14/24 101.1 kg (222 lb 14.2 oz)   06/03/24 101.1 kg (222 lb 14.2 oz)       GENERAL APPEARANCE: NAD.  RESP: No wheezes  CV: irregular, irregular.  ABDOMEN: obese, soft. NT  EXTREMITIES/SKIN: no edema         Data:     CBC RESULTS:     Recent Labs   Lab 02/02/25  0542 02/01/25  0553 01/31/25  0607 01/30/25  0540 01/29/25  1355 01/29/25  0436   WBC 5.6 6.4 5.3 5.3  --  4.8   RBC 2.41* 2.45* 2.47* 2.37*  --  2.36*   HGB 7.6* 7.8* 8.1* 7.4* 8.0* 7.5*   HCT 25.3* 25.4* 25.6* 24.5*  --  24.2*    175 174 159  --  169       Basic Metabolic Panel:  Recent Labs   Lab 02/03/25  1348 02/03/25  0950 02/03/25  0453 02/03/25  0411 02/03/25  0033 02/02/25  2028 02/02/25  0814 02/02/25  0542 02/01/25  0837 02/01/25  0553 01/31/25  0933 01/31/25  0607 01/30/25  0927 01/30/25  0540 01/29/25  0753 01/29/25  0436   NA  --   --  136  --   --   --   --  138  --  139  --  140  --  137  --  140   POTASSIUM  --   --  5.7*  --   --   --   --  4.9  --  5.7*  --  4.5  --  4.9  --  4.1   CHLORIDE  --   --  99  --   --   --   --  99  --  99  --  99  --  98  --  99   CO2  --   --  29  --   --   --   --  30*  --  30*  --  31*  --  29  --  30*   BUN  --   --  58.9*  --   --   --   --  36.2*  --  70.2*  --  47.3*  --  70.9*  --  46.3*   CR  --   --  3.49*  --   --   --   --  2.38*  --  4.05*  --  2.90*  --  3.65*  --  2.48*   * 170* 158* 147* 154* 158*   < > 211*   < > 191*   < > 164*   < > " 202*   < > 157*   JODY  --   --  9.8  --   --   --   --  9.5  --  10.2  --  10.0  --  9.8  --  10.2    < > = values in this interval not displayed.       INRNo lab results found in last 7 days.   Attestation:   I have reviewed today's relevant vital signs, notes, medications, labs and imaging.    Davide Madrid MD  [facility] - Nephrology   [phone]

## 2025-02-03 NOTE — PROGRESS NOTES
Pt has been on a 2L oxymask throughout the day with SpO2 in the mid 90's. BS diminished. All nebs were given as ordered. Plan for pt to wear BiPAP overnight.  Will cont to follow.  2/3/2025  Celeste Hatch, RT

## 2025-02-03 NOTE — PROGRESS NOTES
[facility]    Medicine Progress Note - Hospitalist Service    Date of Admission:  1/8/2025    Assessment & Plan   Supriya Herr is a 74 year old female with PMHx significant for ESRD on HD, CHF, COPD, poor mobility (Lt AKA, Obesity, WC bound), DM type II, hypertension.  She was brought to the ER by EMS for evaluation of shortness of breath, diagnosed with influenza A and admitted on 1/8/25      Hospital course summary  Patient was admitted, subsequently intubated for worsening respiratory failure and remained on mechanical ventilator 1/9 - 1/18.  She was reintubated and remained on mechanical ventilator 1/20 - 1/25.  Was treated with Tamiflu, IV antibiotic, steroid, has completed courses.  Hospital course complicated by A-fib RVR.  Was on amiodarone drip with eventual transition to p.o. Also required diltiazem  for rate control.  Required BiPAP--HFNC--intermittent BiPAP use.  Ongoing tube feeding and modified diet per SLP.  Delirium managed with Seroquel.  Concern for recurrent pneumonia, hospital-acquired on 2/1, restarted on antibiotics.  ID consulted, recommended discontinuing antibiotics, respiratory failure likely related to fluid overload.    Acute hypoxic respiratory failure  Influenza A pneumonia  Acute COPD exacerbation  Hospital-acquired pneumonia  Pharyngeal edema  Initial presentation with shortness of breath, diagnosed influenza A. Initial course as noted above.  -Completed Tamiflu.  -Had bronchoscopy and BAL 1/20, culture grew actinomyces, CT chest showed RML infiltrate versus atelectasis but no sign of actinomycosis per intensivist and felt this was likely colonization.  Pneumonia treated with cefepime and vancomycin, cefepime changed to Levaquin and completed antibiotic course 1/26.  -Was treated with IV steroid, completed course.  -Continue nebs-budesonide 1 Mg twice daily, DuoNeb 4 times daily, as needed albuterol.  -Weaned down to oxymask on 1/30/25.  Still requiring  intermittent BiPAP.    -2/1: Increased hypoxia and shortness of breath on the morning of 2/1/25.  WBC within normal limits.  Afebrile.  VBG OK.  -Concern for RUL pneumonia, started empiric antibiotics-vancomycin and levofloxacin.  Repeat blood cultures on 2/1 NGTD.  Procalcitonin returned elevated at 3.28 on 2/2.  However no prior baseline this hospitalization. CT chest on 2/3 also concerning for RUL pneumonia.  -Consulted ID given recurrent pneumonia, has had multiple antibiotics this hospitalization.  Appreciate their evaluation.  They recommended discontinuing antibiotics, respiratory failure at this time more likely related to fluid overload.  Checking MRSA PCR.    Atrial fibrillation with RVR  Acute on chronic CHFpEF  Hypertension  Shock, due to sedation  PTA is on amlodipine 5 Mg daily, Coreg 25 Mg p.o. twice daily and Lasix.  -Was started on amiodarone drip, transitioned to p.o. on 1/27/25 with plan for a 14 day course.  -Managed with diltiazem drip.  This was transitioned to p.o. diltiazem on 2/3.  -Resumed PTA carvedilol at a lower dose on 1/31/25, increased back to PTA dosing of 25 Mg twice daily on 2/2  -Continue with Lipitor 20 Mg daily.  -On apixaban 5 Mg twice daily.  -TTE this is stay shows LVEF 60%, no pericardial effusion, no significant valvular disease, diastolic function indeterminate.  No WMA.  -Cardiology followed previously, signed off 1/21/25.  Consider reconsulting if rate remains difficult to control after resuming and titrating up carvedilol.  -PTA Lasix on hold, patient is anuric, managing fluid with hemodialysis.  Last BNP was around 50K on 1/20.  Recheck BNP on 2/2 around 41K.  -Monitor daily weight and intake and output as able.  -Discussed with nephrology on 2/3 regarding PTA Lasix on hold-they have resumed Lasix daily on nondialysis days.    Diabetes mellitus, type 2  HbA1c 6.5% on 1/17/25.  PTA is on Lantus 18 units daily and NovoLog 5 units 3 times daily.  -Currently on Lantus 15  units daily.  -ISS.  -Blood sugars fairly well controlled.  Monitor as diet is advanced and tube feedings are discontinued, will likely need to adjust insulin regimen.    ESRD on HD Tue-Thu-Sat, anuric  Anemia of chronic disease  Hyperkalemia  -Nephrology following-managing dialysis needs  -Next dialysis on 2/4.  Apparently patient was dyspneic even after dialysis the previous night and needed to be put back on BiPAP.  -Daily renal panel.  -Hemoglobin 7-8, at baseline.  -Gets EPO with hemodialysis.  -Continue sevelamer and vitamin D3.  -Nephrology resumed Lasix on nondialysis days on 2/3.  Also giving Lokelma for hyperkalemia.  Next dialysis on 2/4.    Constipation  -Noted on CT 2/2  -Placed on bowel regimen    Encephalopathy, improved  Anxiety  Had CT head 1/20, negative for acute intracranial process.  Volume loss and chronic microvascular ischemic changes noted.  Suspected some baseline cognitive impairment.  No agitation.  -Continue Seroquel 25 Mg every morning and 50 Mg every afternoon.  -Continue PTA Zoloft and gabapentin.  -Delirium precautions.  -PT, OT eval.  -Social work consult for discharge planning.    Dysphagia  Suspect due to recurrent intubation.  -Dietitian consulted.  -Continue tube feedings for now.  -Did well with video swallow study on 1/31/25, diet is being advanced.  -Consider stopping tube feedings and removing NJ tube in the near future if able to advance diet successfully/consistently.          Diet: Adult Formula Drip Feeding: Continuous PayClip Standard 1.4; Nasoduodenal tube; Goal Rate: 55; mL/hr; Change TF product to PayClip 1.4, begin at 15 mL/hr and increase every 6 hours by 20 mL to goal.  Combination Diet Soft and Bite Sized Diet (level 6); Thin Liquids (level 0) (upright, upper dentures in, straws OK)    DVT Prophylaxis: DOAC  Bradshaw Catheter: Not present  Lines: PRESENT      CVC Triple Lumen Right Internal jugular-Site Assessment: WDL  Hemodialysis Vascular Access AV fistula  "Superior Arm-Site Assessment: Thrill present;Bruit present      Cardiac Monitoring: None  Code Status: Full Code      Clinically Significant Risk Factors        # Hyperkalemia: Highest K = 5.7 mmol/L in last 2 days, will monitor as appropriate        # Hypoalbuminemia: Lowest albumin = 2.8 g/dL at 1/13/2025  5:14 AM, will monitor as appropriate     # Hypertension: Noted on problem list    # Chronic heart failure with preserved ejection fraction: heart failure noted on problem list and last echo with EF >50%    # Acute Hypercapnic Respiratory Failure: based on venous blood gas results.  Continue supplemental oxygen and ventilatory support as indicated.        # DMII: A1C = 6.5 % (Ref range: <5.7 %) within past 6 months   # Obesity: Estimated body mass index is 36.53 kg/m  as calculated from the following:    Height as of this encounter: 1.702 m (5' 7\").    Weight as of this encounter: 105.8 kg (233 lb 4 oz).        # Financial/Environmental Concerns: none         Social Drivers of Health    Tobacco Use: Medium Risk (9/11/2024)    Patient History     Smoking Tobacco Use: Former     Smokeless Tobacco Use: Never   Physical Activity: Insufficiently Active (7/19/2024)    Exercise Vital Sign     Days of Exercise per Week: 1 day     Minutes of Exercise per Session: 10 min   Social Connections: Unknown (7/19/2024)    Social Connection and Isolation Panel [NHANES]     Frequency of Social Gatherings with Friends and Family: More than three times a week          Disposition Plan     Medically Ready for Discharge: Anticipated in 2-4 Days pending improvement in respiratory status             Liset Romero MD  Hospitalist Service  [facility]  Securely message with Tom (more info)  Text page via [employer] Paging/Directory   ______________________________________________________________________    Interval History   Patient stable overnight, was on 2 L oxygen this morning.  Taken off BiPAP this morning.  " Appeared alert and conversant, better than last few days.  Hopefully turning a corner.  Noted no abdominal pain today.  Reviewed CT result from yesterday with her-showed constipation.  Placed on bowel regimen.  Updated daughter on 2/2.    Physical Exam   Vital Signs: Temp: 98.3  F (36.8  C) Temp src: Axillary BP: 139/50 Pulse: 100   Resp: 18 SpO2: 97 % O2 Device: BiPAP/CPAP Oxygen Delivery: 4 LPM  Weight: 233 lbs 3.95 oz    Constitutional: awake, cooperative, no apparent distress, laying in the hospital bed wearing BiPAP  Respiratory: no increased work of breathing, diminished at the bases, BiPAP in use  Cardiovascular: irregular, regular rate, normal S1 and S2, systolic murmur noted  GI: normal bowel sounds, soft, non-distended, mild upper abdominal tenderness, NJ tube in place  Skin: warm, dry  Musculoskeletal: no lower extremity pitting edema present, left AKA  Neurologic: awake, conversant and answering simple questions moving all extremities    Medical Decision Making       55 MINUTES SPENT BY ME on the date of service doing chart review, history, exam, documentation & further activities per the note.      Data     I have personally reviewed the following data over the past 24 hrs:    N/A  \   N/A   / N/A     136 99 58.9 (H) /  170 (H)   5.7 (H) 29 3.49 (H) \     ALT: N/A AST: N/A AP: N/A TBILI: N/A   ALB: N/A TOT PROTEIN: N/A LIPASE: N/A     Trop: N/A BNP: N/A     Procal: N/A CRP: N/A Lactic Acid: N/A         Imaging results reviewed over the past 24 hrs:   Recent Results (from the past 24 hours)   CT Chest/Abdomen/Pelvis w Contrast    Narrative    EXAM: CT CHEST/ABDOMEN/PELVIS W CONTRAST  LOCATION: [facility]  DATE: 2/2/2025    INDICATION: abdo pain, unclear etiology, worsening resp failure  COMPARISON: CT chest dated 120  TECHNIQUE: CT scan of the chest, abdomen, and pelvis was performed following injection of IV contrast. Multiplanar reformats were obtained. Dose reduction  techniques were used.   CONTRAST: 114 mL Isovue 370  LIMITATIONS: Arm down positioning with streak artifact; motion artifact    FINDINGS:   LUNGS AND PLEURA: Moderate bilateral pleural effusions, increased in the interval. Increasing associated compressive atelectasis. New heterogeneous airspace disease in the right upper lobe compatible with pneumonia. No pneumothorax. Retained secretions   in the trachea.    MEDIASTINUM/AXILLAE: Stable cardiomegaly. Stable prominent mediastinal nodes, potentially reactive in the setting of infection. Right-sided central venous catheter terminates at the cavoatrial junction.    CORONARY ARTERY CALCIFICATION: Severe.    HEPATOBILIARY: Gallbladder is thick-walled but decompressed. No intrahepatic biliary ductal dilatation.    PANCREAS: Unremarkable    SPLEEN: Unremarkable    ADRENAL GLANDS: Unremarkable    KIDNEYS/BLADDER: Bilateral renal cortical thinning without hydronephrosis. Relative right renal atrophy. Bladder is normal in contour.    BOWEL: Large amount of retained contrast in the colon. Feeding tube terminates in the proximal jejunum. No evidence of small bowel obstruction. Relatively large fecal burden in the rectosigmoid.    LYMPH NODES: Multiple mildly enlarged left periaortic nodes reaching 10 mm.    VASCULATURE: Extensive vascular calcification without abdominal aortic aneurysm.    PELVIC ORGANS: No free fluid    MUSCULOSKELETAL: Postoperative changes in the right pelvis. Advanced degenerative changes in the right hip. Generalized osteopenia. Multilevel spondylosis.      Impression    IMPRESSION:  1.  New right upper lobe airspace disease compatible with pneumonia. Increasing bilateral pleural effusions.  2.  Large fecal burden in the rectosigmoid.  3.  Nonspecific mediastinal and retroperitoneal adenopathy.  4.  Advanced degenerative changes in the right hip.

## 2025-02-03 NOTE — PROGRESS NOTES
Heart Failure Care Map  GOALS TO BE MET BEFORE DISCHARGE:    1. Decrease congestion and/or edema with diuretic therapy to achieve near optimal volume status.     Dyspnea improved: No, further care required to meet this goal. Please explain BiPAP dependebt overnight   Edema improved: Yes, satisfactory for discharge.        Last 24 hour I/O:   Intake/Output Summary (Last 24 hours) at 2/3/2025 0634  Last data filed at 2/3/2025 0600  Gross per 24 hour   Intake 1905 ml   Output 700 ml   Net 1205 ml           Net I/O and Weights since admission:   01/04 0700 - 02/03 0659  In: 58461.12 [P.O.:540; I.V.:91111.12]  Out: 82197 [Urine:700]  Net: 06116.12     Vitals:    01/08/25 0727 01/08/25 1942 01/09/25 0743 01/09/25 0958   Weight: 119 kg (262 lb 5.6 oz) 96.8 kg (213 lb 6.5 oz) 95.6 kg (210 lb 12.2 oz) 96.3 kg (212 lb 4.9 oz)    01/10/25 0400 01/11/25 0000 01/12/25 0000 01/13/25 0348   Weight: 98.4 kg (216 lb 14.9 oz) 96.5 kg (212 lb 11.9 oz) 97.3 kg (214 lb 8.1 oz) 98.8 kg (217 lb 13 oz)    01/14/25 0500 01/15/25 0100 01/16/25 0300 01/17/25 0600   Weight: 99.4 kg (219 lb 2.2 oz) 99.4 kg (219 lb 2.2 oz) 101.8 kg (224 lb 6.9 oz) 100.7 kg (222 lb 0.1 oz)    01/18/25 0200 01/19/25 0400 01/20/25 0500 01/21/25 0400   Weight: 101.2 kg (223 lb 1.7 oz) 99.3 kg (218 lb 14.7 oz) 100.4 kg (221 lb 5.5 oz) 96 kg (211 lb 10.3 oz)    01/23/25 0000 01/24/25 0200 01/25/25 0500 01/26/25 0300   Weight: 101 kg (222 lb 10.6 oz) 102 kg (224 lb 13.9 oz) 102.8 kg (226 lb 10.1 oz) 102 kg (224 lb 13.9 oz)    01/27/25 0200 01/28/25 0300 01/29/25 0500 01/30/25 0600   Weight: 106.5 kg (234 lb 12.6 oz) 103.9 kg (229 lb 0.9 oz) 99.7 kg (219 lb 12.8 oz) 102.8 kg (226 lb 10.1 oz)    02/01/25 0600 02/02/25 0600 02/03/25 0500   Weight: 110.2 kg (242 lb 15.2 oz) 103.5 kg (228 lb 2.8 oz) 105.8 kg (233 lb 4 oz)       2.  O2 sats > 90% on room air, or at prior home O2 therapy level.      Able to wean O2 this shift to keep sats above 90%?: No, further care required  to meet this goal. Please explain BiPAP dependent   Does patient use Home O2? No          Current oxygenation status:   SpO2: 98 %     O2 Device: BiPAP/CPAP, Oxygen Delivery: 4 LPM    3.  Tolerates ambulation and mobility near baseline.     Ambulation: No, further care required to meet this goal. Please explain ongoing therapies   Times patient ambulated with staff this shift: 0    Please review the Heart Failure Care Map for additional HF goal outcomes.    Gerri Hallman RN  2/3/2025

## 2025-02-03 NOTE — PLAN OF CARE
VS:  Stable  Assist by:  2; Lift TF    Cardiac:  Irregular R&R / CP free overnight   Neuro: A&Ox4  CMS: Denies paresthesias    Respi: diminished to auscultation; on BiPAP overnight  : Anuric; On HD  GI: Abdo obese, soft, non- tender; BS audible; Box1 overnight.    Strip/Tele: Aflutter CVR    Pressure areas/Skin:     - PA's intact    - drying rashes to skin folds    - Blanchable redness to Sacrum/coccyx; no open area --- Foam dressing applied       LDA's:      - R IJ ongoing Diltiazem gtt   - L AVF, (+) Thrill and Bruit   - NG Tube at L Nostril; Secured with Bridle -- on [employer] 1.4 feeds at 55mL/hr        Plans:    > start empiric antibiotics.   > +/- ID consult    > Continue tube feedings for now   > Consider stopping tube feedings and removing NJ tube in the near future if able to advance diet successfully/consistently.      > Cardio:              - signed off 1/21/25.       > Renal:              - HD 2/4        Problem: Adult Inpatient Plan of Care  Goal: Absence of Hospital-Acquired Illness or Injury  Intervention: Prevent Skin Injury  Recent Flowsheet Documentation  Taken 2/2/2025 2000 by Gerri Hallman RN  Body Position: supine, head elevated  Intervention: Prevent Infection  Recent Flowsheet Documentation  Taken 2/2/2025 2000 by Gerri Hallman, RN  Infection Prevention: rest/sleep promoted     Problem: Gas Exchange Impaired  Goal: Optimal Gas Exchange  Intervention: Optimize Oxygenation and Ventilation  Recent Flowsheet Documentation  Taken 2/2/2025 2000 by Gerri Hallman, RN  Head of Bed (HOB) Positioning: HOB at 20 degrees     Problem: Restraint, Nonviolent  Goal: Absence of Harm or Injury  Intervention: Protect Skin and Joint Integrity  Recent Flowsheet Documentation  Taken 2/2/2025 2000 by Gerri Hallman RN  Body Position: supine, head elevated     Problem: Mechanical Ventilation Invasive  Goal: Optimal Device Function  Intervention: Optimize Device  Care and Function  Recent Flowsheet Documentation  Taken 2/2/2025 2000 by Gerri Hallman, RN  Airway Safety Measures: all equipment/monitors on and audible  Goal: Absence of Ventilator-Induced Lung Injury  Intervention: Prevent Ventilator-Associated Pneumonia  Recent Flowsheet Documentation  Taken 2/2/2025 2000 by Gerri Hallman, RN  Head of Bed (HOB) Positioning: HOB at 20 degrees

## 2025-02-04 ENCOUNTER — APPOINTMENT (OUTPATIENT)
Dept: SPEECH THERAPY | Facility: CLINIC | Age: 75
DRG: 207 | End: 2025-02-04
Attending: HOSPITALIST
Payer: COMMERCIAL

## 2025-02-04 LAB
ANION GAP SERPL CALCULATED.3IONS-SCNC: 10 MMOL/L (ref 7–15)
BUN SERPL-MCNC: 77.8 MG/DL (ref 8–23)
C PNEUM DNA SPEC QL NAA+PROBE: NOT DETECTED
CALCIUM SERPL-MCNC: 9.7 MG/DL (ref 8.8–10.4)
CHLORIDE SERPL-SCNC: 98 MMOL/L (ref 98–107)
CREAT SERPL-MCNC: 4.44 MG/DL (ref 0.51–0.95)
EGFRCR SERPLBLD CKD-EPI 2021: 10 ML/MIN/1.73M2
FLUAV H1 2009 PAND RNA SPEC QL NAA+PROBE: NOT DETECTED
FLUAV H1 RNA SPEC QL NAA+PROBE: NOT DETECTED
FLUAV H3 RNA SPEC QL NAA+PROBE: NOT DETECTED
FLUAV RNA SPEC QL NAA+PROBE: NOT DETECTED
FLUBV RNA SPEC QL NAA+PROBE: NOT DETECTED
GLUCOSE BLDC GLUCOMTR-MCNC: 108 MG/DL (ref 70–99)
GLUCOSE BLDC GLUCOMTR-MCNC: 145 MG/DL (ref 70–99)
GLUCOSE BLDC GLUCOMTR-MCNC: 168 MG/DL (ref 70–99)
GLUCOSE BLDC GLUCOMTR-MCNC: 170 MG/DL (ref 70–99)
GLUCOSE BLDC GLUCOMTR-MCNC: 182 MG/DL (ref 70–99)
GLUCOSE BLDC GLUCOMTR-MCNC: 188 MG/DL (ref 70–99)
GLUCOSE BLDC GLUCOMTR-MCNC: 200 MG/DL (ref 70–99)
GLUCOSE BLDC GLUCOMTR-MCNC: 223 MG/DL (ref 70–99)
GLUCOSE SERPL-MCNC: 178 MG/DL (ref 70–99)
HADV DNA SPEC QL NAA+PROBE: NOT DETECTED
HCO3 SERPL-SCNC: 27 MMOL/L (ref 22–29)
HCOV PNL SPEC NAA+PROBE: NOT DETECTED
HMPV RNA SPEC QL NAA+PROBE: NOT DETECTED
HPIV1 RNA SPEC QL NAA+PROBE: NOT DETECTED
HPIV2 RNA SPEC QL NAA+PROBE: NOT DETECTED
HPIV3 RNA SPEC QL NAA+PROBE: NOT DETECTED
HPIV4 RNA SPEC QL NAA+PROBE: NOT DETECTED
M PNEUMO DNA SPEC QL NAA+PROBE: NOT DETECTED
MAGNESIUM SERPL-MCNC: 2.3 MG/DL (ref 1.7–2.3)
POTASSIUM SERPL-SCNC: 6.4 MMOL/L (ref 3.4–5.3)
RSV RNA SPEC QL NAA+PROBE: NOT DETECTED
RSV RNA SPEC QL NAA+PROBE: NOT DETECTED
RV+EV RNA SPEC QL NAA+PROBE: NOT DETECTED
SODIUM SERPL-SCNC: 135 MMOL/L (ref 135–145)

## 2025-02-04 PROCEDURE — 250N000013 HC RX MED GY IP 250 OP 250 PS 637: Performed by: HOSPITALIST

## 2025-02-04 PROCEDURE — 94640 AIRWAY INHALATION TREATMENT: CPT | Mod: 76

## 2025-02-04 PROCEDURE — 99232 SBSQ HOSP IP/OBS MODERATE 35: CPT | Performed by: INTERNAL MEDICINE

## 2025-02-04 PROCEDURE — 94660 CPAP INITIATION&MGMT: CPT

## 2025-02-04 PROCEDURE — 210N000001 HC R&B IMCU HEART CARE

## 2025-02-04 PROCEDURE — 83735 ASSAY OF MAGNESIUM: CPT | Performed by: HOSPITALIST

## 2025-02-04 PROCEDURE — 99232 SBSQ HOSP IP/OBS MODERATE 35: CPT | Performed by: SPECIALIST

## 2025-02-04 PROCEDURE — 92526 ORAL FUNCTION THERAPY: CPT | Mod: GN | Performed by: SPEECH-LANGUAGE PATHOLOGIST

## 2025-02-04 PROCEDURE — 99232 SBSQ HOSP IP/OBS MODERATE 35: CPT | Performed by: HOSPITALIST

## 2025-02-04 PROCEDURE — 80048 BASIC METABOLIC PNL TOTAL CA: CPT | Performed by: HOSPITALIST

## 2025-02-04 PROCEDURE — 82565 ASSAY OF CREATININE: CPT | Performed by: HOSPITALIST

## 2025-02-04 PROCEDURE — 94640 AIRWAY INHALATION TREATMENT: CPT

## 2025-02-04 PROCEDURE — 634N000001 HC RX 634: Mod: JZ | Performed by: INTERNAL MEDICINE

## 2025-02-04 PROCEDURE — 250N000009 HC RX 250: Performed by: HOSPITALIST

## 2025-02-04 PROCEDURE — 99207 PR NO BILLABLE SERVICE THIS VISIT: CPT | Performed by: NURSE PRACTITIONER

## 2025-02-04 PROCEDURE — 82310 ASSAY OF CALCIUM: CPT | Performed by: HOSPITALIST

## 2025-02-04 PROCEDURE — 258N000003 HC RX IP 258 OP 636: Performed by: INTERNAL MEDICINE

## 2025-02-04 PROCEDURE — 90935 HEMODIALYSIS ONE EVALUATION: CPT

## 2025-02-04 PROCEDURE — 250N000011 HC RX IP 250 OP 636: Performed by: HOSPITALIST

## 2025-02-04 PROCEDURE — 999N000157 HC STATISTIC RCP TIME EA 10 MIN

## 2025-02-04 RX ORDER — AMINO ACIDS/PROTEIN HYDROLYS 11G-40/45
1 LIQUID IN PACKET (ML) ORAL 2 TIMES DAILY
Status: DISCONTINUED | OUTPATIENT
Start: 2025-02-04 | End: 2025-02-12

## 2025-02-04 RX ORDER — FUROSEMIDE 20 MG/1
80 TABLET ORAL DAILY
Status: DISCONTINUED | OUTPATIENT
Start: 2025-02-05 | End: 2025-02-06

## 2025-02-04 RX ORDER — IPRATROPIUM BROMIDE AND ALBUTEROL SULFATE 2.5; .5 MG/3ML; MG/3ML
3 SOLUTION RESPIRATORY (INHALATION) EVERY 4 HOURS PRN
Status: DISCONTINUED | OUTPATIENT
Start: 2025-02-04 | End: 2025-02-26 | Stop reason: HOSPADM

## 2025-02-04 RX ADMIN — DILTIAZEM HYDROCHLORIDE 60 MG: 30 TABLET, FILM COATED ORAL at 20:50

## 2025-02-04 RX ADMIN — Medication 5 ML: at 06:23

## 2025-02-04 RX ADMIN — QUETIAPINE FUMARATE 50 MG: 50 TABLET ORAL at 20:55

## 2025-02-04 RX ADMIN — QUETIAPINE FUMARATE 25 MG: 25 TABLET ORAL at 09:17

## 2025-02-04 RX ADMIN — MICONAZOLE NITRATE: 2 POWDER TOPICAL at 20:49

## 2025-02-04 RX ADMIN — Medication 50 MCG: at 09:16

## 2025-02-04 RX ADMIN — MICONAZOLE NITRATE: 2 POWDER TOPICAL at 09:59

## 2025-02-04 RX ADMIN — APIXABAN 5 MG: 5 TABLET, FILM COATED ORAL at 09:16

## 2025-02-04 RX ADMIN — Medication 40 MG: at 09:14

## 2025-02-04 RX ADMIN — IPRATROPIUM BROMIDE AND ALBUTEROL SULFATE 3 ML: .5; 3 SOLUTION RESPIRATORY (INHALATION) at 15:31

## 2025-02-04 RX ADMIN — SENNOSIDES 10 ML: 8.8 LIQUID ORAL at 20:51

## 2025-02-04 RX ADMIN — IPRATROPIUM BROMIDE AND ALBUTEROL SULFATE 3 ML: .5; 3 SOLUTION RESPIRATORY (INHALATION) at 22:35

## 2025-02-04 RX ADMIN — INSULIN GLARGINE 15 UNITS: 100 INJECTION, SOLUTION SUBCUTANEOUS at 20:51

## 2025-02-04 RX ADMIN — IPRATROPIUM BROMIDE AND ALBUTEROL SULFATE 3 ML: .5; 3 SOLUTION RESPIRATORY (INHALATION) at 07:40

## 2025-02-04 RX ADMIN — Medication 60 ML: at 21:13

## 2025-02-04 RX ADMIN — Medication 15 ML: at 12:13

## 2025-02-04 RX ADMIN — TACROLIMUS: 1 OINTMENT TOPICAL at 20:52

## 2025-02-04 RX ADMIN — SEVELAMER CARBONATE 800 MG: 800 TABLET, FILM COATED ORAL at 17:46

## 2025-02-04 RX ADMIN — BUDESONIDE 1 MG: 1 INHALANT ORAL at 07:40

## 2025-02-04 RX ADMIN — IPRATROPIUM BROMIDE AND ALBUTEROL SULFATE 3 ML: .5; 3 SOLUTION RESPIRATORY (INHALATION) at 10:58

## 2025-02-04 RX ADMIN — ATORVASTATIN CALCIUM 20 MG: 20 TABLET, FILM COATED ORAL at 20:50

## 2025-02-04 RX ADMIN — CARVEDILOL 25 MG: 25 TABLET, FILM COATED ORAL at 09:17

## 2025-02-04 RX ADMIN — INSULIN GLARGINE 15 UNITS: 100 INJECTION, SOLUTION SUBCUTANEOUS at 09:23

## 2025-02-04 RX ADMIN — SODIUM CHLORIDE 500 ML: 9 INJECTION, SOLUTION INTRAVENOUS at 14:32

## 2025-02-04 RX ADMIN — DILTIAZEM HYDROCHLORIDE 60 MG: 30 TABLET, FILM COATED ORAL at 09:16

## 2025-02-04 RX ADMIN — BUDESONIDE 1 MG: 1 INHALANT ORAL at 19:28

## 2025-02-04 RX ADMIN — Medication 5 ML: at 09:15

## 2025-02-04 RX ADMIN — SEVELAMER CARBONATE 800 MG: 800 TABLET, FILM COATED ORAL at 09:16

## 2025-02-04 RX ADMIN — TACROLIMUS: 1 OINTMENT TOPICAL at 09:59

## 2025-02-04 RX ADMIN — IPRATROPIUM BROMIDE AND ALBUTEROL SULFATE 3 ML: .5; 3 SOLUTION RESPIRATORY (INHALATION) at 19:28

## 2025-02-04 RX ADMIN — DILTIAZEM HYDROCHLORIDE 60 MG: 30 TABLET, FILM COATED ORAL at 02:38

## 2025-02-04 RX ADMIN — APIXABAN 5 MG: 5 TABLET, FILM COATED ORAL at 20:50

## 2025-02-04 RX ADMIN — EPOETIN ALFA-EPBX 10000 UNITS: 10000 INJECTION, SOLUTION INTRAVENOUS; SUBCUTANEOUS at 14:56

## 2025-02-04 RX ADMIN — SERTRALINE HYDROCHLORIDE 75 MG: 50 TABLET ORAL at 20:50

## 2025-02-04 RX ADMIN — CARVEDILOL 25 MG: 25 TABLET, FILM COATED ORAL at 17:46

## 2025-02-04 RX ADMIN — CETIRIZINE HYDROCHLORIDE 5 MG: 5 TABLET ORAL at 20:53

## 2025-02-04 ASSESSMENT — ACTIVITIES OF DAILY LIVING (ADL)
ADLS_ACUITY_SCORE: 77

## 2025-02-04 NOTE — PROGRESS NOTES
Potassium   Date Value Ref Range Status   02/04/2025 6.4 (HH) 3.4 - 5.3 mmol/L Final   02/02/2022 4.7 3.4 - 5.3 mmol/L Final   04/17/2021 4.2 3.4 - 5.3 mmol/L Final     Hemoglobin   Date Value Ref Range Status   02/02/2025 7.6 (L) 11.7 - 15.7 g/dL Final   04/16/2021 10.9 (L) 11.7 - 15.7 g/dL Final     Creatinine   Date Value Ref Range Status   02/04/2025 4.44 (H) 0.51 - 0.95 mg/dL Final   04/17/2021 5.98 (H) 0.52 - 1.04 mg/dL Final     Urea Nitrogen   Date Value Ref Range Status   02/04/2025 77.8 (H) 8.0 - 23.0 mg/dL Final   11/24/2021 37 (H) 7 - 30 mg/dL Final   04/17/2021 68 (H) 7 - 30 mg/dL Final     Sodium   Date Value Ref Range Status   02/04/2025 135 135 - 145 mmol/L Final   04/17/2021 137 133 - 144 mmol/L Final     INR   Date Value Ref Range Status   04/24/2024 1.11 0.85 - 1.15 Final   04/16/2021 1.14 0.86 - 1.14 Final       DIALYSIS PROCEDURE NOTE  Hepatitis status of previous patient on machine log was checked and verified ok to use with this patients hepatitis status.  Patient dialyzed for 3.5 hrs. on a K1 for the 1st hour then K2 bath for 2.5 hours. with a net fluid removal of  3L.  A BFR of 450 ml/min was obtained via a left AVF using 15 gauge needles.        Total heparin received during the treatment: 0 units.   Needle cannulation sites held x 10 min in total.       Meds  given: Epoetin 97085 units   Complications: None      Person educated: Patient. Knowledge base Substantial. Barriers to learning: None. Educated on procedure via verbal mode. Patient verbalized understanding.   ICEBOAT? Timeout performed pre-treatment  I: Patient was identified using 2 identifiers  C:  Consent Signed Yes  E: Equipment preventative maintenance is current and dialysis delivery system OK to use  B: Hepatitis B Surface Antigen: Negative; Draw Date: 01/08/2025      Hepatitis B Surface Antibody: Susceptible; Draw Date: 01/08/2025  O: Dialysis orders present and complete prior to treatment  A: Vascular access verified and  assessed prior to treatment  T: Treatment was performed at a clinically appropriate time  ?: Patient was allowed to ask questions and address concerns prior to treatment  See Adult Hemodialysis flowsheet in EPIC for further details and post assessment.  Machine water alarm in place and functioning. Transducer pods intact and checked every 15min.   Pt assisted with repositioning throughout dialysis treatment.  Chlorine/Chloramine water system checked every 4 hours.    Post treatment report given to JACQUI Dominguez RN regarding 3L of fluid removed.    Please remove patient dressing on AVF and AVG needle sites 24 hours after dialysis. If leaking occurs please apply a Band-Aid.     RADHA Ulloa RN

## 2025-02-04 NOTE — PROGRESS NOTES
Renal Medicine Progress Note            Assessment/Plan:     75 y.o woman with ESRD, admitted for respiratory distress due to influenza A infection.      # ESRD:               -TTS               -RAVF               -Dr. Basilio               -[facility]                 # Influenza A:               -finished Tamiflu     # FEN: hypekalemia.     # Anemia: Hgb is below target                -epo with HD    # Volume overload     Plan:  # Hemodialysis today.  3-4 L ultrafiltration as tolerated. 1->2 K bath  # renal diet-  # PTA Lasix 80 mg twice daily on nondialysis day which has been on hold.  .  Can resume 80 mg daily on nondialysis days orally  # Noted patient moved to Monday Wednesday Friday schedule at her outpatient [facility].  Continue with TTS dialysis this week and switch to Monday Wednesday Friday next week.              Interval History:     Afebrile.  Off BiPAP this morning.  Breathing stable.  K6.4         Medications and Allergies:     Current Facility-Administered Medications   Medication Dose Route Frequency Provider Last Rate Last Admin    - MEDICATION INSTRUCTIONS for Dialysis Patients -   Does not apply See Admin Instructions Braden Montana MD        amiodarone (PACERONE) tablet 200 mg  200 mg Oral or FT or NG tube Daily Denver Shipman MD   200 mg at 02/03/25 0955    apixaban ANTICOAGULANT (ELIQUIS) tablet 5 mg  5 mg Oral or Feeding Tube BID Denver Shipman MD   5 mg at 02/04/25 0916    atorvastatin (LIPITOR) tablet 20 mg  20 mg Oral or Feeding Tube QPM Denver Shipman MD   20 mg at 02/03/25 2023    B and C vitamin Complex with folic acid (NEPHRONEX) liquid 5 mL  5 mL Per Feeding Tube Daily Denver Shipman MD   5 mL at 02/04/25 0915    budesonide (PULMICORT) neb solution 1 mg  1 mg Nebulization BID Denver Shipman MD   1 mg at 02/04/25 0740    carvedilol (COREG) tablet 25 mg  25 mg Oral or Feeding Tube BID w/meals OvianLiset MD   25 mg at 02/04/25 0917    cetirizine  (zyrTEC) tablet 5 mg  5 mg Oral or Feeding Tube At Bedtime Denver Shipman MD   5 mg at 02/03/25 2024    diltiazem (CARDIZEM) tablet 60 mg  60 mg Per G Tube Q6H VÍCTOR Liset Romero MD   60 mg at 02/04/25 0916    epoetin candy-epbx (RETACRIT) injection 10,000 Units  10,000 Units Intravenous Once in dialysis/CRRT Davide Madrid MD        heparin lock flush 10 unit/mL injection 5-20 mL  5-20 mL Intracatheter Q24H Liset Romero MD   15 mL at 02/04/25 1213    insulin aspart (NovoLOG) injection (RAPID ACTING)  1-12 Units Subcutaneous Q4H Denver Shipman MD   2 Units at 02/04/25 1209    insulin glargine (LANTUS PEN) injection 15 Units  15 Units Subcutaneous BID Denver Shipman MD   15 Units at 02/04/25 0923    ipratropium - albuterol 0.5 mg/2.5 mg/3 mL (DUONEB) neb solution 3 mL  3 mL Nebulization 4x daily Denver Shipman MD   3 mL at 02/04/25 1058    miconazole (MICATIN) 2 % powder   Topical BID Denver Shipman MD   Given at 02/04/25 0959    No heparin via hemodialysis machine   Does not apply Once Davide Madrid MD        No heparin via hemodialysis machine   Does not apply Once Davide Madrid MD        pantoprazole (PROTONIX) 2 mg/mL suspension 40 mg  40 mg Per Feeding Tube QAM AC Denver Shipman MD   40 mg at 02/04/25 0914    Or    pantoprazole (PROTONIX) IV push injection 40 mg  40 mg Intravenous QAM Denver Mays MD   40 mg at 02/03/25 1118    polyethylene glycol (MIRALAX) Packet 17 g  17 g Oral or Feeding Tube Daily Liset Romero MD   17 g at 02/03/25 1119    QUEtiapine (SEROquel) tablet 25 mg  25 mg Oral or Feeding Tube QAM Denver Shipman MD   25 mg at 02/04/25 0917    QUEtiapine (SEROquel) tablet 50 mg  50 mg Oral or Feeding Tube At Bedtime Denver Shipman MD   50 mg at 02/03/25 2024    sennosides (SENOKOT) syrup 10 mL  10 mL Oral or Feeding Tube BID Liset Romero MD   10 mL at 02/03/25 1305    sertraline (ZOLOFT) tablet 75 mg  75 mg Oral or Feeding Tube QPM  "Denver Shipman MD   75 mg at 02/03/25 2023    sevelamer carbonate (RENVELA) tablet 800 mg  800 mg Oral TID w/meals Denver Shipman MD   800 mg at 02/04/25 0916    sodium chloride 0.9% BOLUS 200 mL  200 mL Hemodialysis Machine Once Davide Madrid MD        sodium chloride 0.9% BOLUS 200 mL  200 mL Hemodialysis Machine Once Davide Madrid MD        sodium chloride 0.9% BOLUS 250 mL  250 mL Intravenous Once in dialysis/CRRT Davide Madrid MD        sodium chloride 0.9% BOLUS 250 mL  250 mL Intravenous Once in dialysis/CRRT Davide Madrid MD        sodium chloride 0.9% BOLUS 500 mL  500 mL Hemodialysis Machine Once Davide Madrid MD        sodium chloride 0.9% BOLUS 500 mL  500 mL Hemodialysis Machine Once Davide Madrid MD        tacrolimus (PROTOPIC) 0.1 % ointment   Topical BID Denver Shipman MD   Given at 02/04/25 0959    vitamin D3 (CHOLECALCIFEROL) tablet 50 mcg  50 mcg Oral or Feeding Tube Daily Denver Shipman MD   50 mcg at 02/04/25 0916        Allergies   Allergen Reactions    Ampicillin-Sulbactam Sodium Rash     No evidence SJS, but very uncomfortable and precipitated multiple provider visits. Would not use penicillins again if other options available.     Penicillins Rash            Physical Exam:   Vitals were reviewed   , Blood pressure (!) 151/69, pulse 86, temperature 97.1  F (36.2  C), temperature source Oral, resp. rate 18, height 1.702 m (5' 7.01\"), weight 108.3 kg (238 lb 12.1 oz), SpO2 95%, not currently breastfeeding.    Wt Readings from Last 3 Encounters:   02/04/25 108.3 kg (238 lb 12.1 oz)   06/14/24 101.1 kg (222 lb 14.2 oz)   06/03/24 101.1 kg (222 lb 14.2 oz)       GENERAL APPEARANCE: NAD.  RESP: No wheezes  CV: irregular, irregular.  ABDOMEN: obese, soft. NT  EXTREMITIES/SKIN: no edema         Data:     CBC RESULTS:     Recent Labs   Lab 02/02/25  0542 02/01/25  0553 01/31/25  0607 01/30/25  0540 01/29/25  1355 01/29/25  0436   WBC 5.6 6.4 5.3 5.3  --  4.8   RBC 2.41* 2.45* 2.47* 2.37*  " --  2.36*   HGB 7.6* 7.8* 8.1* 7.4* 8.0* 7.5*   HCT 25.3* 25.4* 25.6* 24.5*  --  24.2*    175 174 159  --  169       Basic Metabolic Panel:  Recent Labs   Lab 02/04/25  1201 02/04/25  0808 02/04/25  0624 02/04/25  0428 02/04/25  0215 02/04/25  0012 02/03/25  0950 02/03/25  0453 02/02/25  0814 02/02/25  0542 02/01/25  0837 02/01/25  0553 01/31/25  0933 01/31/25  0607 01/30/25  0927 01/30/25  0540   NA  --   --  135  --   --   --   --  136  --  138  --  139  --  140  --  137   POTASSIUM  --   --  6.4*  --   --   --   --  5.7*  --  4.9  --  5.7*  --  4.5  --  4.9   CHLORIDE  --   --  98  --   --   --   --  99  --  99  --  99  --  99  --  98   CO2  --   --  27  --   --   --   --  29  --  30*  --  30*  --  31*  --  29   BUN  --   --  77.8*  --   --   --   --  58.9*  --  36.2*  --  70.2*  --  47.3*  --  70.9*   CR  --   --  4.44*  --   --   --   --  3.49*  --  2.38*  --  4.05*  --  2.90*  --  3.65*   * 168* 178* 188* 200* 223*   < > 158*   < > 211*   < > 191*   < > 164*   < > 202*   JODY  --   --  9.7  --   --   --   --  9.8  --  9.5  --  10.2  --  10.0  --  9.8    < > = values in this interval not displayed.       INRNo lab results found in last 7 days.   Attestation:   I have reviewed today's relevant vital signs, notes, medications, labs and imaging.    Davide Madrid MD  [facility] - Nephrology   [phone]

## 2025-02-04 NOTE — PLAN OF CARE
Orientations: A&O x4, forgetful  Vitals/Pain: VSS on bipap overnight  Tele: A-fib w/CVR  Lines/Drains: R internal jugular line  Skin/Wounds: scattered bruising  GI/: no urine output  Labs: Abnormal/Trends, Electrolyte Replacement- Q4H BG  Ambulation/Assist: A2/lift  Sleep Quality: good  Plan: dialysis today

## 2025-02-04 NOTE — PROGRESS NOTES
CLINICAL NUTRITION SERVICES - REASSESSMENT NOTE     Malnutrition Status:  (1/21)  % Intake: Decreased intake does not meet criteria (TF was at goal 1/31)  % Weight Loss: Weight loss does not meet criteria  (fluids, down closer to admit weight)  Subcutaneous Fat Loss: None observed (visual, in isolation)  Muscle Loss: None observed (visual, in isolation) - may be masked by obesity   Fluid Accumulation/Edema: Trace, 1+ (likely not nutritional, improved 1/30)  Malnutrition Diagnosis: Patient does not meet two of the established criteria necessary for diagnosing malnutrition  Malnutrition Present on Admission: Unable to assess     Registered Dietitian Interventions:  Modify EN schedule to nocturnal cycle (6 am to 4 pm) to encourage po intake as follows,     Grecia Endomedix 1.4 at 55 ml x 14 hours (770 ml) + 2 pkt Prosource TF 20 (160 kcal, 40 g PRO) provides: 1238 kcal 19 kcal/kg), 88 g PRO (1.3 g/kg), 121 g CHO, 12 g fiber, 547 ml free water   - this meets 76% energy needs and 100% PRO needs    Future/Additional Recommendations:  Continue to monitor po ability vs ability to further decrease TF provisions      SUBJECTIVE INFORMATION  Patient not available for interview due to reviewing plans to cycle feeds with both MD and RN    CURRENT NUTRITION ORDERS  Diet: Soft and Bite Sized diet, Thin liquids and Room Service Appropriate with assist    Nutrition Support:  Grecia Endomedix 1.4 at 55 mL/hr = 1848 kcal (28 kcal/kg), 82 g protein (1.3 g/kg), 207 g CHO, 20 g fiber, 937 mL H2O     ASSESSED NUTRITION NEEDS (DW = 65.5 kg (adjusted))  Estimated Energy Needs: 4553-9708 kcals/day (25-30 kcals/kg)  Justification: Obese  Estimated Protein Needs:  grams protein/day (1.2-1.5 grams of pro/kg)  Justification: Hypercatabolism with acute illness, HD  Estimated Fluid Needs: 8742-6164 mL/day (1 mL/kcal)  Justification:  or per MD     CURRENT INTAKE/TOLERANCE  Flowsheets and RN notes show 25% intake yesterday and while pt tolerated it well,  "they got full quickly.      NEW FINDINGS  - Diet was advanced on Friday 1/31 but pt only had marginal intakes documented until yesterday when intakes were limited d/t fullness. Messaged with MD about cycling feeds and they were in agreement.     Weight:   02/03/25 0500 105.8 kg (233 lb 4 oz) Bed scale   02/02/25 0600 103.5 kg (228 lb 2.8 oz) Bed scale   02/01/25 0600 110.2 kg (242 lb 15.2 oz) Bed scale   01/30/25 0600 102.8 kg (226 lb 10.1 oz) Bed scale   01/29/25 0500 99.7 kg (219 lb 12.8 oz) Bed scale   01/28/25 0300 103.9 kg (229 lb 0.9 oz) Bed scale   01/27/25 0200 106.5 kg (234 lb 12.6 oz) --   01/26/25 0300 102 kg (224 lb 13.9 oz) Bed scale   01/25/25 0500 102.8 kg (226 lb 10.1 oz) --   01/24/25 0200 102 kg (224 lb 13.9 oz) Bed scale   01/23/25 0000 101 kg (222 lb 10.6 oz) --   01/21/25 0400 96 kg (211 lb 10.3 oz) Bed scale   01/20/25 0500 100.4 kg (221 lb 5.5 oz) Bed scale   01/19/25 0400 99.3 kg (218 lb 14.7 oz) --   01/18/25 0200 101.2 kg (223 lb 1.7 oz) Bed scale   01/17/25 0600 100.7 kg (222 lb 0.1 oz) Bed scale   01/16/25 0300 101.8 kg (224 lb 6.9 oz) --   01/15/25 0100 99.4 kg (219 lb 2.2 oz) --   01/14/25 0500 99.4 kg (219 lb 2.2 oz) --   01/13/25 0348 98.8 kg (217 lb 13 oz) Bed scale   01/12/25 0000 97.3 kg (214 lb 8.1 oz) Bed scale   01/11/25 0000 96.5 kg (212 lb 11.9 oz) Bed scale   01/10/25 0400 98.4 kg (216 lb 14.9 oz) Bed scale   01/09/25 0958 96.3 kg (212 lb 4.9 oz) --   01/09/25 0743 95.6 kg (210 lb 12.2 oz) Bed scale   01/08/25 1942 96.8 kg (213 lb 6.5 oz) Bed scale   Skin/wounds: 1/20 WOCN following for buttock wound (d/t friction) - stable as of 1/15, New consult 1/20 WOCN but \"primary RN scope of practice and per policy for the primary RN to monitor for changes in skin condition...WOC nurse follow-up plan: signing off\"   GI symptoms: BM x1-3 most days but had x4-5 yesterday  Nutrition-relevant labs: BUN 77.8 (H), Cr 4.44 (H) - CKD on HD, - 223 (H)   Nutrition-relevant medications: " Lantus 15 units BID, High sliding scale insulin, Nephronex, Vitamin D3     EVALUATION OF THE PROGRESS TOWARD GOALS   Previous Goals  TF regimen will meet % needs   Evaluation: Met    Previous Nutrition Diagnosis  Inadequate oral intake related to diet restrictions, previous mild dysphagia now improving as evidenced by  diet just advanced to Soft and Bite Sized diet, but still reliant on EN support to meet nutrition needs until able to take in adequate po intake  Evaluation: Improving    NUTRITION DIAGNOSIS  Inadequate oral intake related to diet restrictions, previous mild dysphagia now improving as evidenced by diet just advanced to Soft and Bite Sized diet, but intakes still slow to increase (25%) and reliant on EN support to meet nutrition needs until able to take in adequate po intake    INTERVENTIONS  Enteral nutrition management    Goals  EN - to meet  72% energy needs and 100% PRO needs while po increasing  PO - to continue to increase to be able to wean EN support    Monitoring/Evaluation      Progress toward goals will be monitored and evaluated per policy.

## 2025-02-04 NOTE — PROGRESS NOTES
Care Management Follow Up    Length of Stay (days): 27    Expected Discharge Date: 02/06/2025     Concerns to be Addressed: other (see comments) (elevated risk)     Patient plan of care discussed at interdisciplinary rounds: Yes    Anticipated Discharge Disposition: Skilled Nursing Facility              Anticipated Discharge Services: None  Anticipated Discharge DME: None    Patient/family educated on Medicare website which has current facility and service quality ratings: no  Education Provided on the Discharge Plan: No  Patient/Family in Agreement with the Plan: yes    Referrals Placed by CM/SW:    Private pay costs discussed: Not applicable    Discussed  Partnership in Safe Discharge Planning  document with patient/family: No     Handoff Completed: Yes, [facility] PCP: Internal handoff referral completed    Additional Information:  Patient will need TCU at bedside. Confirmd with bedside RNKamryn that patient is requiring a lift to get out of bed and confirmed that she could sit in a wheelchair. Updated from Kamryn that writer should speak with daughter Caroline Gray about discharge plans.Will call daughter and discuss TCU and costs of transport to dialysis until patient is able to tolerate family transport.     Writer called Veronica and introduced self. Explained she is calling to talk about potential discharge plans when patient is medically ready. Caroline Gray asking for writer to call her back around 11am.     Addendum 1130: Writer called Caroline Gray and explained reason for call. Explained that Writer assists with discharge once patient is more medicallt ready and exxplained we start these conversations earlier. Daughter pleased to hear this positive news as she is hoping patient is moving in the right direction. Writer explained she anticipate patient has several more days to get optimizes but wanted to start the conversations. Explained that patient is up with an assist of 2 with a lift. Daughter in agreement with TCU. Reviewed  Medicare ratings and she is in agreement with referrals sent to: [facility], [facility], [facility] (knows people who live there), [facility], [facility], [facility], [facility], [facility]. Writer explained due to patient needing a lift will likely need transport arranged for dialysis and explained that this is a private pay costs. Writer shared that costs vary on agency and once we have an accepting TCU and determine what agencies they use for dialysis and discuss costs further. Daughter explained that although she would love to limit costs, she is more focused on the best care for patient. She states patient does not have a home BIPAP but does have a CPAP but has not used it for over a year. Writer explained she will start looking for placement and will update her once we have an accepting facility/closer to discharge. Daughter in agreement. Daughter shares that patient does go to dialysis on T.Th,Sa however; was informed from the clinic that they had to move patient to M, W,Fr and she does not know how long they will hold her spot. She is hoping patient can continue dialysis at this clinic since she has been there for a long time. She believes the spot is for 1:45/2pm chair time.     Writer called [facility] and spoke with Gay and Carmen. Carmen says her spot is still being held for M, W, F chair time and thinks it is being held for 2 more weeks. She said she will have [place] call the daughter tomorrow to confirm.      Writer paged  to determine If we are trying to get patient off Bipap and TF for discharge as this will determine placement and when referrals will be sent out. At this time- referrals wont be sent until writer feels we are closer to discharged.     Addendum [phone]: Now see that  is listed at [facility]. Writer sent a message to  about [facility] and if patient will need Bipap/Tube feeds at discharge. Message received from  stating that  they are starting to Cycle Tube feeds to increase PO intake- hoping to get patient off Tube feeds and Bipap if able.     Next Steps: Follow for discharge needs  KAITLYNN Mcbride, [facility]  Social Work- Inpatient Care Coordination  [facility]

## 2025-02-04 NOTE — PLAN OF CARE
A&Ox 4, forgetful at times. VSS on 2 L oxy mask. Denies pain. Up with lift. BM during shift. Tele A fib CVR on dilt gtt @ 10. TF @ goal of 55 ml/hr and H 2 O Q4H 60 ml/hr.  sliding scale q 4 hours. Rt internal jugular. Hemodialysis 2/4.Soft and bite size and thin liquid diet. Discharge pending.

## 2025-02-04 NOTE — PLAN OF CARE
Goal Outcome Evaluation:      Plan of Care Reviewed With: patient      Neuro:forgetful, new learning recall loss  CV/Rhythm:AFlutter CVR  Resp/02:2 L oxymask, bipap at [facility]  GI/Diet:soft, bite sized  :bladder scanned for 11cc, plan for HD tuesday  Skin/Incisions/Sites:rash to groin/breast, dry area to L stump - intact, calloused  Pulses/CMS:doppler pedal  Edema:-  Activity/Falls Risk:fall risk, 2 to turn, lift  Lines/Drains/IVs:RI  Labs/BGM:-sent nasal swabs  Test/Procedures:-  VS/Pain:stable, no pain  DC Plan:?  Other:PT speech    Heart Failure Care Map  GOALS TO BE MET BEFORE DISCHARGE:    1. Decrease congestion and/or edema with diuretic therapy to achieve near optimal volume status.     Dyspnea improved: No, further care required to meet this goal. Please explain oxymask, bipap for sleep   Edema improved: Yes, satisfactory for discharge.        Last 24 hour I/O:   Intake/Output Summary (Last 24 hours) at 2/3/2025 2347  Last data filed at 2/3/2025 2300  Gross per 24 hour   Intake 2497.92 ml   Output --   Net 2497.92 ml           Net I/O and Weights since admission:   01/05 0700 - 02/04 0659  In: 60540.04 [P.O.:600; I.V.:95379.04]  Out: 95231 [Urine:700]  Net: 80517.04     Vitals:    01/08/25 0727 01/08/25 1942 01/09/25 0743 01/09/25 0958   Weight: 119 kg (262 lb 5.6 oz) 96.8 kg (213 lb 6.5 oz) 95.6 kg (210 lb 12.2 oz) 96.3 kg (212 lb 4.9 oz)    01/10/25 0400 01/11/25 0000 01/12/25 0000 01/13/25 0348   Weight: 98.4 kg (216 lb 14.9 oz) 96.5 kg (212 lb 11.9 oz) 97.3 kg (214 lb 8.1 oz) 98.8 kg (217 lb 13 oz)    01/14/25 0500 01/15/25 0100 01/16/25 0300 01/17/25 0600   Weight: 99.4 kg (219 lb 2.2 oz) 99.4 kg (219 lb 2.2 oz) 101.8 kg (224 lb 6.9 oz) 100.7 kg (222 lb 0.1 oz)    01/18/25 0200 01/19/25 0400 01/20/25 0500 01/21/25 0400   Weight: 101.2 kg (223 lb 1.7 oz) 99.3 kg (218 lb 14.7 oz) 100.4 kg (221 lb 5.5 oz) 96 kg (211 lb 10.3 oz)    01/23/25 0000 01/24/25 0200 01/25/25 0500 01/26/25 0300   Weight: 101 kg (222  lb 10.6 oz) 102 kg (224 lb 13.9 oz) 102.8 kg (226 lb 10.1 oz) 102 kg (224 lb 13.9 oz)    01/27/25 0200 01/28/25 0300 01/29/25 0500 01/30/25 0600   Weight: 106.5 kg (234 lb 12.6 oz) 103.9 kg (229 lb 0.9 oz) 99.7 kg (219 lb 12.8 oz) 102.8 kg (226 lb 10.1 oz)    02/01/25 0600 02/02/25 0600 02/03/25 0500   Weight: 110.2 kg (242 lb 15.2 oz) 103.5 kg (228 lb 2.8 oz) 105.8 kg (233 lb 4 oz)       2.  O2 sats > 90% on room air, or at prior home O2 therapy level.      Able to wean O2 this shift to keep sats above 90%?: No, further care required to meet this goal. Please explain bipap at noc, 2 L oxymask during the day   Does patient use Home O2? No          Current oxygenation status:   SpO2: 94 %     O2 Device: BiPAP/CPAP, Oxygen Delivery: 2 LPM    3.  Tolerates ambulation and mobility near baseline.     Ambulation: No, further care required to meet this goal. Please explain lift pt   Times patient ambulated with staff this shift: 0    Please review the Heart Failure Care Map for additional HF goal outcomes.    Jalyn Dunaway, RN  2/3/2025

## 2025-02-04 NOTE — PROGRESS NOTES
[facility]    Medicine Progress Note - Hospitalist Service    Date of Admission:  1/8/2025    Assessment & Plan   Supriya Herr is a 74 year old female with PMHx significant for ESRD on HD, CHF, COPD, poor mobility (Lt AKA, Obesity, WC bound), DM type II, hypertension.  She was brought to the ER by EMS for evaluation of shortness of breath, diagnosed with influenza A and admitted on 1/8/25      Hospital course summary  Patient was admitted, subsequently intubated for worsening respiratory failure and remained on mechanical ventilator 1/9 - 1/18.  She was reintubated and remained on mechanical ventilator 1/20 - 1/25.  Was treated with Tamiflu, IV antibiotic, steroid, has completed courses.  Hospital course complicated by A-fib RVR.  Was on amiodarone drip with eventual transition to p.o. Also required diltiazem  for rate control.  Required BiPAP--HFNC--intermittent BiPAP use.  Ongoing tube feeding and modified diet per SLP.  Delirium managed with Seroquel.  Concern for recurrent pneumonia, hospital-acquired on 2/1, restarted on antibiotics.  ID consulted, recommended discontinuing antibiotics, respiratory failure likely related to fluid overload.    Acute hypoxic respiratory failure  Influenza A pneumonia  Acute COPD exacerbation  Hospital-acquired pneumonia  Pharyngeal edema  Initial presentation with shortness of breath, diagnosed influenza A. Initial course as noted above.  -Completed Tamiflu.  -Had bronchoscopy and BAL 1/20, culture grew actinomyces, CT chest showed RML infiltrate versus atelectasis but no sign of actinomycosis per intensivist and felt this was likely colonization.  Pneumonia treated with cefepime and vancomycin, cefepime changed to Levaquin and completed antibiotic course 1/26.  -Was treated with IV steroid, completed course.  -Continue nebs-budesonide 1 Mg twice daily, DuoNeb 4 times daily, as needed albuterol.  -Weaned down to oxymask on 1/30/25.  Still requiring  intermittent BiPAP.    -2/1: Increased hypoxia and shortness of breath on the morning of 2/1/25.  WBC within normal limits.  Afebrile.  VBG OK.  -Concern for RUL pneumonia, started empiric antibiotics-vancomycin and levofloxacin.  Repeat blood cultures on 2/1 NGTD.  Procalcitonin returned elevated at 3.28 on 2/2.  However no prior baseline this hospitalization. CT chest on 2/3 also concerning for RUL pneumonia.  -Consulted ID given recurrent pneumonia, has had multiple antibiotics this hospitalization.  Appreciate their evaluation.  They recommended discontinuing antibiotics, respiratory failure at this time more likely related to fluid overload.  Checking MRSA PCR.    Atrial fibrillation with RVR  Acute on chronic CHFpEF  Hypertension  Shock, due to sedation  PTA is on amlodipine 5 Mg daily, Coreg 25 Mg p.o. twice daily and Lasix.  -Was started on amiodarone drip, transitioned to p.o. on 1/27/25 with plan for a 14 day course.  -Managed with diltiazem drip.  This was transitioned to p.o. diltiazem on 2/3.  -Resumed PTA carvedilol at a lower dose on 1/31/25, increased back to PTA dosing of 25 Mg twice daily on 2/2  -Continue with Lipitor 20 Mg daily.  -On apixaban 5 Mg twice daily.  -TTE this is stay shows LVEF 60%, no pericardial effusion, no significant valvular disease, diastolic function indeterminate.  No WMA.  -Cardiology followed previously, signed off 1/21/25.  Consider reconsulting if rate remains difficult to control after resuming and titrating up carvedilol.  -PTA Lasix on hold, patient is anuric, managing fluid with hemodialysis.  Last BNP was around 50K on 1/20.  Recheck BNP on 2/2 around 41K.  -Monitor daily weight and intake and output as able.  -Discussed with nephrology on 2/3 regarding PTA Lasix on hold-they have resumed Lasix daily on nondialysis days.    Diabetes mellitus, type 2  HbA1c 6.5% on 1/17/25.  PTA is on Lantus 18 units daily and NovoLog 5 units 3 times daily.  -Currently on Lantus 15  units daily.  -ISS.  -Blood sugars fairly well controlled.  Monitor as diet is advanced and tube feedings are discontinued, will likely need to adjust insulin regimen.    ESRD on HD Tue-Thu-Sat, anuric  Anemia of chronic disease  Hyperkalemia  -Nephrology following-managing dialysis needs  -Next dialysis on 2/4.  Apparently patient was dyspneic even after dialysis the previous night and needed to be put back on BiPAP.  -Daily renal panel.  -Hemoglobin 7-8, at baseline.  -Gets EPO with hemodialysis.  -Continue sevelamer and vitamin D3.  -Nephrology resumed Lasix on nondialysis days on 2/3.  Also giving Lokelma for hyperkalemia.      Constipation  -Noted on CT 2/2  -Placed on bowel regimen    Encephalopathy, improved  Anxiety  Had CT head 1/20, negative for acute intracranial process.  Volume loss and chronic microvascular ischemic changes noted.  Suspected some baseline cognitive impairment.  No agitation.  -Continue Seroquel 25 Mg every morning and 50 Mg every afternoon.  -Continue PTA Zoloft and gabapentin.  -Delirium precautions.  -PT, OT eval.  -Social work consult for discharge planning.    Dysphagia  Suspect due to recurrent intubation.  -Dietitian consulted.  -Continue tube feedings for now.  -Did well with video swallow study on 1/31/25, diet is being advanced.  -will start cycling tube feeds.           Diet: Adult Formula Drip Feeding: Continuous [employer] Standard 1.4; Nasoduodenal tube; Goal Rate: 55; mL/hr; Change TF product to [employer] 1.4, begin at 15 mL/hr and increase every 6 hours by 20 mL to goal.  Combination Diet Soft and Bite Sized Diet (level 6); Thin Liquids (level 0) (upright, upper dentures in, straws OK)    DVT Prophylaxis: DOAC  Bradshaw Catheter: Not present  Lines: PRESENT      CVC Triple Lumen Right Internal jugular-Site Assessment: WDL  Hemodialysis Vascular Access AV fistula Superior Arm-Site Assessment: (P) WDL      Cardiac Monitoring: None  Code Status: Full Code      Clinically  "Significant Risk Factors        # Hyperkalemia: Highest K = 6.4 mmol/L in last 2 days, will monitor as appropriate        # Hypoalbuminemia: Lowest albumin = 2.8 g/dL at 1/13/2025  5:14 AM, will monitor as appropriate     # Hypertension: Noted on problem list    # Acute heart failure with preserved ejection fraction: heart failure noted on problem list, last echo with EF >50%, and receiving IV diuretics    # Acute Hypercapnic Respiratory Failure: based on venous blood gas results.  Continue supplemental oxygen and ventilatory support as indicated.        # DMII: A1C = 6.5 % (Ref range: <5.7 %) within past 6 months   # Obesity: Estimated body mass index is 37.39 kg/m  as calculated from the following:    Height as of this encounter: 1.702 m (5' 7.01\").    Weight as of this encounter: 108.3 kg (238 lb 12.1 oz).        # Financial/Environmental Concerns: none         Social Drivers of Health    Tobacco Use: Medium Risk (9/11/2024)    Patient History     Smoking Tobacco Use: Former     Smokeless Tobacco Use: Never   Physical Activity: Insufficiently Active (7/19/2024)    Exercise Vital Sign     Days of Exercise per Week: 1 day     Minutes of Exercise per Session: 10 min   Social Connections: Unknown (7/19/2024)    Social Connection and Isolation Panel [NHANES]     Frequency of Social Gatherings with Friends and Family: More than three times a week          Disposition Plan     Medically Ready for Discharge: Anticipated in 2-4 Days pending improvement in respiratory status             Vivian Wood MD  Hospitalist Service  [facility]  Securely message with [employer] (more info)  Text page via F[employer]print Paging/Directory   ______________________________________________________________________    Interval History   Patient laying in bed. Denies any pain at this time  HD today.     Physical Exam   Vital Signs: Temp: 97.1  F (36.2  C) Temp src: Axillary BP: (!) 151/69 Pulse: 86   Resp: 18 SpO2: 95 % O2 " Device: Oxymask Oxygen Delivery: 1 LPM  Weight: 238 lbs 12.13 oz    General Appearance: Well appearing for stated age.  Respiratory: CTAB, no rales or ronchi  Cardiovascular: S1, S2 normal, no murmurs  GI: non-tender on palpation, BS present      Medical Decision Making       55 MINUTES SPENT BY ME on the date of service doing chart review, history, exam, documentation & further activities per the note.      Data     I have personally reviewed the following data over the past 24 hrs:    N/A  \   N/A   / N/A     135 98 77.8 (H) /  182 (H)   6.4 (HH) 27 4.44 (H) \       Imaging results reviewed over the past 24 hrs:   No results found for this or any previous visit (from the past 24 hours).

## 2025-02-04 NOTE — PROGRESS NOTES
[facility]    Infectious Disease Progress Note    Date of Service (when I saw the patient): 02/04/2025       Assessment:  74 YF with ESRD on HD, CHF, COPD, poor mobility (Lt AKA, Obesity, wheelchair bound status),diabetes, hypertension, who has been hospitalized since 1/8 due to influenza A with respiratory failure requiring intubation with mechanical ventilation. She has been treated with antiviral and antimicrobial therapy for presumed bacterial pneumonia, and course was further complicated by COPD exacerbation, atrial fibrillation with RVR and CHF. Feel dyspnea and hypoxia are  related to CHFor aspiration pneumonitis as opposed to a new bacterial infection given absence of fever or leukocytosis     -Hypoxic respiratory failure likely related to CHF/fluid overload  -Influenza A on admission which has been treated, also treated for bacterial pneumonia  -Prolonged hospitalization  -Atrial fibrillation with RVR with acute on chronic CHF  -chronic medical conditions - HTN, diabetes, ESRD on HD, anxiety. Dysphagia        Recommendations:  Dialysis is planned for today, respiratory status may improve following dialysis  Continue supportive care  Monitor off antibiotics    ID will sign off, please call us back as needed    Carmen Mcdonald MD    Interval History   Remains afebrile, additional diagnostics including respiratory viral PCR panel and MRSA PCR were negative, remains stable off antibiotics  Feels better, denies cough or phlegm production, breathing is stable though remains on oxygen via face mask    Physical Exam   Temp: 97.1  F (36.2  C) Temp src: Oral BP: (!) 151/69 Pulse: 86   Resp: 20 SpO2: 99 % O2 Device: BiPAP/CPAP Oxygen Delivery: 2 LPM  Vitals:    02/01/25 0600 02/02/25 0600 02/03/25 0500   Weight: 110.2 kg (242 lb 15.2 oz) 103.5 kg (228 lb 2.8 oz) 105.8 kg (233 lb 4 oz)     Vital Signs with Ranges  Temp:  [97.1  F (36.2  C)-98.6  F (37  C)] 97.1  F (36.2  C)  Pulse:  []  86  Resp:  [18-20] 20  BP: (116-151)/(54-77) 151/69  FiO2 (%):  [30 %] 30 %  SpO2:  [89 %-99 %] 99 %    GENERAL APPEARANCE:  awake, NG tube in place with tube feed   EYES: Eyes grossly normal to inspection  NECK: no adenopathy  RESP: lungs clear , diminished air entry at bases  CV: S1S2  ABDOMEN: soft, nontender  MS: L AKA, chronic stasis changes RLE  SKIN: no rash    Other:    Medications   Current Facility-Administered Medications   Medication Dose Route Frequency Provider Last Rate Last Admin    dextrose 10% infusion   Intravenous Continuous PRN Denver Shipman MD        No lozenges or gum should be given while patient on BIPAP/AVAPS/AVAPS AE   Does not apply Continuous PRN Denver Shipman MD        Patient may continue current oral medications   Does not apply Continuous PRN Denver Shipman MD        sodium chloride 0.9 % infusion   Intravenous Continuous Denver Shipman MD   Stopped at 01/28/25 1400     Current Facility-Administered Medications   Medication Dose Route Frequency Provider Last Rate Last Admin    - MEDICATION INSTRUCTIONS for Dialysis Patients -   Does not apply See Admin Instructions Braden Montana MD        amiodarone (PACERONE) tablet 200 mg  200 mg Oral or FT or NG tube Daily Denver Shipman MD   200 mg at 02/03/25 0955    apixaban ANTICOAGULANT (ELIQUIS) tablet 5 mg  5 mg Oral or Feeding Tube BID Denver Shipman MD   5 mg at 02/03/25 2024    atorvastatin (LIPITOR) tablet 20 mg  20 mg Oral or Feeding Tube QPM Denver Shipman MD   20 mg at 02/03/25 2023    B and C vitamin Complex with folic acid (NEPHRONEX) liquid 5 mL  5 mL Per Feeding Tube Daily Denver Shipman MD   5 mL at 02/03/25 0955    budesonide (PULMICORT) neb solution 1 mg  1 mg Nebulization BID Denver Shipman MD   1 mg at 02/04/25 0740    carvedilol (COREG) tablet 25 mg  25 mg Oral or Feeding Tube BID w/meals OvianLiset MD   25 mg at 02/03/25 1750    cetirizine (zyrTEC) tablet 5 mg  5 mg Oral  or Feeding Tube At Bedtime Denver Shipman MD   5 mg at 02/03/25 2024    diltiazem (CARDIZEM) tablet 60 mg  60 mg Per G Tube Q6H [facility] Liset Romero MD   60 mg at 02/04/25 0238    heparin lock flush 10 unit/mL injection 5-20 mL  5-20 mL Intracatheter Q24H Liset Romero MD   5 mL at 02/03/25 1306    insulin aspart (NovoLOG) injection (RAPID ACTING)  1-12 Units Subcutaneous Q4H Denver Shipman MD   2 Units at 02/04/25 0428    insulin glargine (LANTUS PEN) injection 15 Units  15 Units Subcutaneous BID Denver Shipman MD   15 Units at 02/03/25 2133    ipratropium - albuterol 0.5 mg/2.5 mg/3 mL (DUONEB) neb solution 3 mL  3 mL Nebulization 4x daily Denver Shipman MD   3 mL at 02/04/25 0740    levofloxacin (LEVAQUIN) infusion 500 mg  500 mg Intravenous Q48H EkBraden hawley  mL/hr at 02/03/25 1832 500 mg at 02/03/25 1832    miconazole (MICATIN) 2 % powder   Topical BID Denver Shipman MD   Given at 02/03/25 2024    No heparin via hemodialysis machine   Does not apply Once Davide Madrid MD        pantoprazole (PROTONIX) 2 mg/mL suspension 40 mg  40 mg Per Feeding Tube QAM  Denver Shipman MD   40 mg at 01/30/25 1203    Or    pantoprazole (PROTONIX) IV push injection 40 mg  40 mg Intravenous QAM Denver Mays MD   40 mg at 02/03/25 1118    polyethylene glycol (MIRALAX) Packet 17 g  17 g Oral or Feeding Tube Daily Liset Romero MD   17 g at 02/03/25 1119    QUEtiapine (SEROquel) tablet 25 mg  25 mg Oral or Feeding Tube QA Denver Shipman MD   25 mg at 02/03/25 0955    QUEtiapine (SEROquel) tablet 50 mg  50 mg Oral or Feeding Tube At Bedtime Denver Shipman MD   50 mg at 02/03/25 2024    sennosides (SENOKOT) syrup 10 mL  10 mL Oral or Feeding Tube BID Liset Romero MD   10 mL at 02/03/25 1305    sertraline (ZOLOFT) tablet 75 mg  75 mg Oral or Feeding Tube QPM Denver Shipman MD   75 mg at 02/03/25 2023    sevelamer carbonate (RENVELA) tablet 800 mg  800  mg Oral TID w/meals Denver Shipman MD   800 mg at 02/03/25 1750    sodium chloride 0.9% BOLUS 200 mL  200 mL Hemodialysis Machine Once Davide Madrid MD        sodium chloride 0.9% BOLUS 250 mL  250 mL Intravenous Once in dialysis/CRRT Davide Madrid MD        sodium chloride 0.9% BOLUS 500 mL  500 mL Hemodialysis Machine Once Davide Madrid MD        tacrolimus (PROTOPIC) 0.1 % ointment   Topical BID Denver Shipman MD   Given at 02/03/25 2025    vitamin D3 (CHOLECALCIFEROL) tablet 50 mcg  50 mcg Oral or Feeding Tube Daily Denver Shipman MD   50 mcg at 02/03/25 0955       Data   All microbiology laboratory data reviewed.  Recent Labs   Lab Test 02/02/25  0542 02/01/25  0553 01/31/25  0607   WBC 5.6 6.4 5.3   HGB 7.6* 7.8* 8.1*   HCT 25.3* 25.4* 25.6*   * 104* 104*    175 174     Recent Labs   Lab Test 02/04/25  0624 02/03/25  0453 02/02/25  0542   CR 4.44* 3.49* 2.38*     Recent Labs   Lab Test 06/03/24  1644   *

## 2025-02-05 ENCOUNTER — APPOINTMENT (OUTPATIENT)
Dept: GENERAL RADIOLOGY | Facility: CLINIC | Age: 75
DRG: 207 | End: 2025-02-05
Attending: HOSPITALIST
Payer: COMMERCIAL

## 2025-02-05 LAB
ALBUMIN SERPL BCG-MCNC: 3.5 G/DL (ref 3.5–5.2)
ALLEN'S TEST: YES
ALP SERPL-CCNC: 75 U/L (ref 40–150)
ALT SERPL W P-5'-P-CCNC: 24 U/L (ref 0–50)
ANION GAP SERPL CALCULATED.3IONS-SCNC: 11 MMOL/L (ref 7–15)
ANION GAP SERPL CALCULATED.3IONS-SCNC: 11 MMOL/L (ref 7–15)
ANION GAP SERPL CALCULATED.3IONS-SCNC: 9 MMOL/L (ref 7–15)
AST SERPL W P-5'-P-CCNC: 17 U/L (ref 0–45)
BASE EXCESS BLDA CALC-SCNC: 6.7 MMOL/L (ref -3–3)
BASE EXCESS BLDV CALC-SCNC: 5.1 MMOL/L (ref -3–3)
BASOPHILS # BLD AUTO: 0 10E3/UL (ref 0–0.2)
BASOPHILS NFR BLD AUTO: 0 %
BILIRUB SERPL-MCNC: 0.3 MG/DL
BUN SERPL-MCNC: 49.2 MG/DL (ref 8–23)
BUN SERPL-MCNC: 63 MG/DL (ref 8–23)
BUN SERPL-MCNC: 64.1 MG/DL (ref 8–23)
CA-I BLD-MCNC: 5.1 MG/DL (ref 4.4–5.2)
CALCIUM SERPL-MCNC: 10.2 MG/DL (ref 8.8–10.4)
CALCIUM SERPL-MCNC: 9.5 MG/DL (ref 8.8–10.4)
CALCIUM SERPL-MCNC: 9.8 MG/DL (ref 8.8–10.4)
CHLORIDE SERPL-SCNC: 91 MMOL/L (ref 98–107)
CHLORIDE SERPL-SCNC: 92 MMOL/L (ref 98–107)
CHLORIDE SERPL-SCNC: 95 MMOL/L (ref 98–107)
CREAT SERPL-MCNC: 2.91 MG/DL (ref 0.51–0.95)
CREAT SERPL-MCNC: 3.34 MG/DL (ref 0.51–0.95)
CREAT SERPL-MCNC: 3.38 MG/DL (ref 0.51–0.95)
CRP SERPL-MCNC: 14.73 MG/L
EGFRCR SERPLBLD CKD-EPI 2021: 13 ML/MIN/1.73M2
EGFRCR SERPLBLD CKD-EPI 2021: 14 ML/MIN/1.73M2
EGFRCR SERPLBLD CKD-EPI 2021: 16 ML/MIN/1.73M2
EOSINOPHIL # BLD AUTO: 0.2 10E3/UL (ref 0–0.7)
EOSINOPHIL NFR BLD AUTO: 3 %
ERYTHROCYTE [DISTWIDTH] IN BLOOD BY AUTOMATED COUNT: 17.2 % (ref 10–15)
GLUCOSE BLDC GLUCOMTR-MCNC: 126 MG/DL (ref 70–99)
GLUCOSE BLDC GLUCOMTR-MCNC: 126 MG/DL (ref 70–99)
GLUCOSE BLDC GLUCOMTR-MCNC: 151 MG/DL (ref 70–99)
GLUCOSE BLDC GLUCOMTR-MCNC: 160 MG/DL (ref 70–99)
GLUCOSE BLDC GLUCOMTR-MCNC: 201 MG/DL (ref 70–99)
GLUCOSE BLDC GLUCOMTR-MCNC: 235 MG/DL (ref 70–99)
GLUCOSE BLDC GLUCOMTR-MCNC: 330 MG/DL (ref 70–99)
GLUCOSE BLDC GLUCOMTR-MCNC: 354 MG/DL (ref 70–99)
GLUCOSE BLDC GLUCOMTR-MCNC: 357 MG/DL (ref 70–99)
GLUCOSE SERPL-MCNC: 165 MG/DL (ref 70–99)
GLUCOSE SERPL-MCNC: 174 MG/DL (ref 70–99)
GLUCOSE SERPL-MCNC: 256 MG/DL (ref 70–99)
HCO3 BLD-SCNC: 32 MMOL/L (ref 21–28)
HCO3 BLDV-SCNC: 33 MMOL/L (ref 21–28)
HCO3 SERPL-SCNC: 29 MMOL/L (ref 22–29)
HCO3 SERPL-SCNC: 29 MMOL/L (ref 22–29)
HCO3 SERPL-SCNC: 30 MMOL/L (ref 22–29)
HCT VFR BLD AUTO: 23.5 % (ref 35–47)
HGB BLD-MCNC: 7.2 G/DL (ref 11.7–15.7)
IMM GRANULOCYTES # BLD: 0.1 10E3/UL
IMM GRANULOCYTES NFR BLD: 1 %
LYMPHOCYTES # BLD AUTO: 0.6 10E3/UL (ref 0.8–5.3)
LYMPHOCYTES NFR BLD AUTO: 10 %
MAGNESIUM SERPL-MCNC: 2.2 MG/DL (ref 1.7–2.3)
MCH RBC QN AUTO: 32 PG (ref 26.5–33)
MCHC RBC AUTO-ENTMCNC: 30.6 G/DL (ref 31.5–36.5)
MCV RBC AUTO: 104 FL (ref 78–100)
MONOCYTES # BLD AUTO: 0.7 10E3/UL (ref 0–1.3)
MONOCYTES NFR BLD AUTO: 10 %
NEUTROPHILS # BLD AUTO: 5 10E3/UL (ref 1.6–8.3)
NEUTROPHILS NFR BLD AUTO: 76 %
NRBC # BLD AUTO: 0 10E3/UL
NRBC BLD AUTO-RTO: 0 /100
O2/TOTAL GAS SETTING VFR VENT: 30 %
O2/TOTAL GAS SETTING VFR VENT: 60 %
OXYHGB MFR BLDA: 94 % (ref 92–100)
OXYHGB MFR BLDV: 81 % (ref 70–75)
PCO2 BLD: 49 MM HG (ref 35–45)
PCO2 BLDV: 72 MM HG (ref 40–50)
PH BLD: 7.42 [PH] (ref 7.35–7.45)
PH BLDV: 7.27 [PH] (ref 7.32–7.43)
PLATELET # BLD AUTO: 161 10E3/UL (ref 150–450)
PO2 BLD: 78 MM HG (ref 80–105)
PO2 BLDV: 56 MM HG (ref 25–47)
POTASSIUM SERPL-SCNC: 4.9 MMOL/L (ref 3.4–5.3)
POTASSIUM SERPL-SCNC: 5.9 MMOL/L (ref 3.4–5.3)
POTASSIUM SERPL-SCNC: 6.1 MMOL/L (ref 3.4–5.3)
PROT SERPL-MCNC: 6.4 G/DL (ref 6.4–8.3)
RBC # BLD AUTO: 2.25 10E6/UL (ref 3.8–5.2)
SAO2 % BLDA: 96.4 % (ref 95–96)
SAO2 % BLDV: 82.6 % (ref 70–75)
SODIUM SERPL-SCNC: 131 MMOL/L (ref 135–145)
SODIUM SERPL-SCNC: 132 MMOL/L (ref 135–145)
SODIUM SERPL-SCNC: 134 MMOL/L (ref 135–145)
T4 FREE SERPL-MCNC: 1.32 NG/DL (ref 0.9–1.7)
TSH SERPL DL<=0.005 MIU/L-ACNC: 5.67 UIU/ML (ref 0.3–4.2)
WBC # BLD AUTO: 6.5 10E3/UL (ref 4–11)

## 2025-02-05 PROCEDURE — 99291 CRITICAL CARE FIRST HOUR: CPT

## 2025-02-05 PROCEDURE — 86140 C-REACTIVE PROTEIN: CPT | Performed by: NURSE PRACTITIONER

## 2025-02-05 PROCEDURE — 82805 BLOOD GASES W/O2 SATURATION: CPT | Performed by: NURSE PRACTITIONER

## 2025-02-05 PROCEDURE — 82435 ASSAY OF BLOOD CHLORIDE: CPT | Performed by: NURSE PRACTITIONER

## 2025-02-05 PROCEDURE — 999N000157 HC STATISTIC RCP TIME EA 10 MIN

## 2025-02-05 PROCEDURE — 93005 ELECTROCARDIOGRAM TRACING: CPT

## 2025-02-05 PROCEDURE — 250N000011 HC RX IP 250 OP 636: Performed by: HOSPITALIST

## 2025-02-05 PROCEDURE — 200N000001 HC R&B ICU

## 2025-02-05 PROCEDURE — 250N000011 HC RX IP 250 OP 636: Mod: JZ | Performed by: NURSE PRACTITIONER

## 2025-02-05 PROCEDURE — 82310 ASSAY OF CALCIUM: CPT

## 2025-02-05 PROCEDURE — 80048 BASIC METABOLIC PNL TOTAL CA: CPT | Performed by: NURSE PRACTITIONER

## 2025-02-05 PROCEDURE — 99291 CRITICAL CARE FIRST HOUR: CPT | Performed by: NURSE PRACTITIONER

## 2025-02-05 PROCEDURE — 250N000013 HC RX MED GY IP 250 OP 250 PS 637: Performed by: NURSE PRACTITIONER

## 2025-02-05 PROCEDURE — 250N000011 HC RX IP 250 OP 636

## 2025-02-05 PROCEDURE — 250N000009 HC RX 250: Performed by: HOSPITALIST

## 2025-02-05 PROCEDURE — 250N000013 HC RX MED GY IP 250 OP 250 PS 637: Performed by: HOSPITALIST

## 2025-02-05 PROCEDURE — 85025 COMPLETE CBC W/AUTO DIFF WBC: CPT | Performed by: HOSPITALIST

## 2025-02-05 PROCEDURE — 250N000012 HC RX MED GY IP 250 OP 636 PS 637: Performed by: NURSE PRACTITIONER

## 2025-02-05 PROCEDURE — 84443 ASSAY THYROID STIM HORMONE: CPT | Performed by: NURSE PRACTITIONER

## 2025-02-05 PROCEDURE — 80048 BASIC METABOLIC PNL TOTAL CA: CPT | Performed by: HOSPITALIST

## 2025-02-05 PROCEDURE — 82805 BLOOD GASES W/O2 SATURATION: CPT

## 2025-02-05 PROCEDURE — 94640 AIRWAY INHALATION TREATMENT: CPT | Mod: 76

## 2025-02-05 PROCEDURE — 94640 AIRWAY INHALATION TREATMENT: CPT

## 2025-02-05 PROCEDURE — 82565 ASSAY OF CREATININE: CPT | Performed by: HOSPITALIST

## 2025-02-05 PROCEDURE — 80048 BASIC METABOLIC PNL TOTAL CA: CPT

## 2025-02-05 PROCEDURE — 99232 SBSQ HOSP IP/OBS MODERATE 35: CPT | Performed by: HOSPITALIST

## 2025-02-05 PROCEDURE — 84132 ASSAY OF SERUM POTASSIUM: CPT | Performed by: HOSPITALIST

## 2025-02-05 PROCEDURE — 83735 ASSAY OF MAGNESIUM: CPT | Performed by: HOSPITALIST

## 2025-02-05 PROCEDURE — 82330 ASSAY OF CALCIUM: CPT

## 2025-02-05 PROCEDURE — 258N000003 HC RX IP 258 OP 636: Performed by: NURSE PRACTITIONER

## 2025-02-05 PROCEDURE — 93010 ELECTROCARDIOGRAM REPORT: CPT | Performed by: INTERNAL MEDICINE

## 2025-02-05 PROCEDURE — 258N000001 HC RX 258: Performed by: NURSE PRACTITIONER

## 2025-02-05 PROCEDURE — 84439 ASSAY OF FREE THYROXINE: CPT | Performed by: NURSE PRACTITIONER

## 2025-02-05 PROCEDURE — 94660 CPAP INITIATION&MGMT: CPT

## 2025-02-05 PROCEDURE — 71045 X-RAY EXAM CHEST 1 VIEW: CPT

## 2025-02-05 PROCEDURE — 250N000013 HC RX MED GY IP 250 OP 250 PS 637: Performed by: INTERNAL MEDICINE

## 2025-02-05 RX ORDER — DEXTROSE MONOHYDRATE 25 G/50ML
50 INJECTION, SOLUTION INTRAVENOUS ONCE
Status: COMPLETED | OUTPATIENT
Start: 2025-02-05 | End: 2025-02-05

## 2025-02-05 RX ORDER — ATROPINE SULFATE 0.1 MG/ML
1 INJECTION INTRAVENOUS ONCE
Status: COMPLETED | OUTPATIENT
Start: 2025-02-05 | End: 2025-02-05

## 2025-02-05 RX ORDER — AZITHROMYCIN MONOHYDRATE 500 MG/5ML
500 INJECTION, POWDER, LYOPHILIZED, FOR SOLUTION INTRAVENOUS EVERY 24 HOURS
Status: DISCONTINUED | OUTPATIENT
Start: 2025-02-05 | End: 2025-02-06

## 2025-02-05 RX ORDER — CALCIUM GLUCONATE 20 MG/ML
1 INJECTION, SOLUTION INTRAVENOUS ONCE
Status: COMPLETED | OUTPATIENT
Start: 2025-02-05 | End: 2025-02-05

## 2025-02-05 RX ORDER — DOPAMINE HYDROCHLORIDE 160 MG/100ML
2-20 INJECTION, SOLUTION INTRAVENOUS CONTINUOUS
Status: DISCONTINUED | OUTPATIENT
Start: 2025-02-05 | End: 2025-02-06

## 2025-02-05 RX ORDER — LORAZEPAM 2 MG/ML
0.5 INJECTION INTRAMUSCULAR EVERY 4 HOURS PRN
Status: DISCONTINUED | OUTPATIENT
Start: 2025-02-05 | End: 2025-02-07

## 2025-02-05 RX ORDER — DOPAMINE HYDROCHLORIDE 160 MG/100ML
INJECTION, SOLUTION INTRAVENOUS
Status: COMPLETED
Start: 2025-02-05 | End: 2025-02-05

## 2025-02-05 RX ADMIN — DEXTROSE MONOHYDRATE 50 ML: 25 INJECTION, SOLUTION INTRAVENOUS at 22:08

## 2025-02-05 RX ADMIN — INSULIN GLARGINE 15 UNITS: 100 INJECTION, SOLUTION SUBCUTANEOUS at 10:38

## 2025-02-05 RX ADMIN — SODIUM CHLORIDE 5 UNITS: 9 INJECTION, SOLUTION INTRAVENOUS at 22:08

## 2025-02-05 RX ADMIN — AZITHROMYCIN MONOHYDRATE 500 MG: 500 INJECTION, POWDER, LYOPHILIZED, FOR SOLUTION INTRAVENOUS at 15:31

## 2025-02-05 RX ADMIN — ONDANSETRON 4 MG: 2 INJECTION, SOLUTION INTRAMUSCULAR; INTRAVENOUS at 18:41

## 2025-02-05 RX ADMIN — BUDESONIDE 1 MG: 1 INHALANT ORAL at 19:45

## 2025-02-05 RX ADMIN — POLYETHYLENE GLYCOL 3350 17 G: 17 POWDER, FOR SOLUTION ORAL at 10:36

## 2025-02-05 RX ADMIN — DILTIAZEM HYDROCHLORIDE 60 MG: 30 TABLET, FILM COATED ORAL at 15:26

## 2025-02-05 RX ADMIN — SENNOSIDES 10 ML: 8.8 LIQUID ORAL at 20:12

## 2025-02-05 RX ADMIN — ATORVASTATIN CALCIUM 20 MG: 20 TABLET, FILM COATED ORAL at 20:13

## 2025-02-05 RX ADMIN — Medication 5 ML: at 10:42

## 2025-02-05 RX ADMIN — LORAZEPAM 0.5 MG: 2 INJECTION INTRAMUSCULAR; INTRAVENOUS at 01:57

## 2025-02-05 RX ADMIN — ACETAMINOPHEN 650 MG: 325 TABLET, FILM COATED ORAL at 15:26

## 2025-02-05 RX ADMIN — Medication 60 ML: at 10:38

## 2025-02-05 RX ADMIN — CALCIUM CARBONATE 1000 MG: 500 TABLET, CHEWABLE ORAL at 15:26

## 2025-02-05 RX ADMIN — Medication 50 MCG: at 10:36

## 2025-02-05 RX ADMIN — SERTRALINE HYDROCHLORIDE 75 MG: 50 TABLET ORAL at 20:13

## 2025-02-05 RX ADMIN — DOPAMINE HYDROCHLORIDE IN DEXTROSE 2 MCG/KG/MIN: 1.6 INJECTION, SOLUTION INTRAVENOUS at 17:45

## 2025-02-05 RX ADMIN — APIXABAN 5 MG: 5 TABLET, FILM COATED ORAL at 10:32

## 2025-02-05 RX ADMIN — Medication 5 ML: at 01:57

## 2025-02-05 RX ADMIN — MICONAZOLE NITRATE: 2 POWDER TOPICAL at 21:30

## 2025-02-05 RX ADMIN — IPRATROPIUM BROMIDE AND ALBUTEROL SULFATE 3 ML: .5; 3 SOLUTION RESPIRATORY (INHALATION) at 19:45

## 2025-02-05 RX ADMIN — INSULIN GLARGINE 15 UNITS: 100 INJECTION, SOLUTION SUBCUTANEOUS at 21:26

## 2025-02-05 RX ADMIN — DOPAMINE HYDROCHLORIDE 2 MCG/KG/MIN: 160 INJECTION, SOLUTION INTRAVENOUS at 17:45

## 2025-02-05 RX ADMIN — DILTIAZEM HYDROCHLORIDE 60 MG: 30 TABLET, FILM COATED ORAL at 10:42

## 2025-02-05 RX ADMIN — CALCIUM GLUCONATE 1 G: 20 INJECTION, SOLUTION INTRAVENOUS at 22:08

## 2025-02-05 RX ADMIN — QUETIAPINE FUMARATE 50 MG: 50 TABLET ORAL at 21:43

## 2025-02-05 RX ADMIN — PANTOPRAZOLE SODIUM 40 MG: 40 INJECTION, POWDER, FOR SOLUTION INTRAVENOUS at 06:30

## 2025-02-05 RX ADMIN — CETIRIZINE HYDROCHLORIDE 5 MG: 5 TABLET ORAL at 21:25

## 2025-02-05 RX ADMIN — IPRATROPIUM BROMIDE AND ALBUTEROL SULFATE 3 ML: .5; 3 SOLUTION RESPIRATORY (INHALATION) at 07:01

## 2025-02-05 RX ADMIN — Medication 5 ML: at 06:30

## 2025-02-05 RX ADMIN — AMIODARONE HYDROCHLORIDE 200 MG: 200 TABLET ORAL at 10:36

## 2025-02-05 RX ADMIN — TACROLIMUS: 1 OINTMENT TOPICAL at 21:30

## 2025-02-05 RX ADMIN — MICONAZOLE NITRATE: 2 POWDER TOPICAL at 10:39

## 2025-02-05 RX ADMIN — IPRATROPIUM BROMIDE AND ALBUTEROL SULFATE 3 ML: .5; 3 SOLUTION RESPIRATORY (INHALATION) at 10:15

## 2025-02-05 RX ADMIN — ATROPINE SULFATE 1 MG: 0.1 INJECTION INTRAVENOUS at 17:33

## 2025-02-05 RX ADMIN — APIXABAN 5 MG: 5 TABLET, FILM COATED ORAL at 20:13

## 2025-02-05 RX ADMIN — SODIUM ZIRCONIUM CYCLOSILICATE 10 G: 5 POWDER, FOR SUSPENSION ORAL at 23:53

## 2025-02-05 RX ADMIN — FUROSEMIDE 80 MG: 80 TABLET ORAL at 10:36

## 2025-02-05 RX ADMIN — SENNOSIDES 10 ML: 8.8 LIQUID ORAL at 10:43

## 2025-02-05 RX ADMIN — CARVEDILOL 25 MG: 25 TABLET, FILM COATED ORAL at 10:36

## 2025-02-05 RX ADMIN — Medication 5 ML: at 10:38

## 2025-02-05 RX ADMIN — DILTIAZEM HYDROCHLORIDE 60 MG: 30 TABLET, FILM COATED ORAL at 01:57

## 2025-02-05 RX ADMIN — TACROLIMUS: 1 OINTMENT TOPICAL at 10:39

## 2025-02-05 ASSESSMENT — ACTIVITIES OF DAILY LIVING (ADL)
ADLS_ACUITY_SCORE: 80
ADLS_ACUITY_SCORE: 77
ADLS_ACUITY_SCORE: 80
ADLS_ACUITY_SCORE: 80
ADLS_ACUITY_SCORE: 84
ADLS_ACUITY_SCORE: 82
ADLS_ACUITY_SCORE: 80
ADLS_ACUITY_SCORE: 77
ADLS_ACUITY_SCORE: 80
ADLS_ACUITY_SCORE: 80
ADLS_ACUITY_SCORE: 82
ADLS_ACUITY_SCORE: 80
ADLS_ACUITY_SCORE: 77
ADLS_ACUITY_SCORE: 80
ADLS_ACUITY_SCORE: 84
ADLS_ACUITY_SCORE: 82
ADLS_ACUITY_SCORE: 84
ADLS_ACUITY_SCORE: 82
ADLS_ACUITY_SCORE: 77
ADLS_ACUITY_SCORE: 80
ADLS_ACUITY_SCORE: 77
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
## 2025-02-05 NOTE — PLAN OF CARE
"Goal Outcome Evaluation:  Neuro- x3 disoriented to time. Forgetful. Periods of confusion, restlessness and agitation. Trial PRN iv ativan x1 w/ some mild improvement. Attempted to wean sitter in the evening but pt frequently pulling at tubes and lines. Sitter at bedside since 2300  Most Recent Vitals- /43 (BP Location: Right arm)   Pulse 71   Temp 98.6  F (37  C) (Axillary)   Resp 18   Ht 1.702 m (5' 7.01\")   Wt 108.3 kg (238 lb 12.1 oz)   LMP  (LMP Unknown)   SpO2 94%   BMI 37.39 kg/m    Tele/Cardiac- SR  Resp- 2-3L NC, Bipap for hypoxia/wob   Activity- bedrest/ lift out of bed. Q2 turns   Pain- denies   GI/- on HD No BM this shift   Labs pending   Blood sugar 145, 170 and 160 Received insulin per orders  Diet: Room Service  Combination Diet Pureed Diet (level 4); Thin Liquids (level 0) (upright, upper dentures in, straws OK)  Adult Formula Drip Feeding: Continuous [employer] Standard 1.4; Nasoduodenal tube; Goal Rate: 55; mL/hr; From: 4:00 PM; To: 6:00 AM; OK to restart nocturnal cycle at goal rate of 55 ml/hr  Pills whole with apple sauce   Plan- Wean oxygen as able, increase po intake and restart pta po lasix       "

## 2025-02-05 NOTE — CODE/RAPID RESPONSE
[facility]    House OLVIN RRT Note  2/5/2025   Time Called: 1718    RRT called for: bradycardia     Assessment & Plan     Symptomatic bradycardia   Cardiogenic shock   Pt this evening ~1700 became very short of breath and have a rhythm change from SR to SB with pauses. EKG with ectopic beats and sinus arrest. HR 38-50. On my arrival pt sitting upright in hospital bed in mild respiratory distress appearing lethargic. Opens eyes to voice. Answers question appropriately. Denies pain, CP, or dizziness. No chest pressure or palpitations. Mild SOB however improving with BiPAP. As RRT progressed became increasingly hypotensive 72/44 with tachypnea. Dopamine started and transfer to ICU.     INTERVENTIONS:  -I joss, bmp, cbc, ABG   -1 mg atropine; ineffective   -EKG, sinus yaya with arhythmia    -Hold amio, coreg and dilt (has received amio and dilt)   -Re consulted cardiology messaged Dr. Farris who is in agreement with dopamine and may need to consider temporary pacer   -Started DOPamine infusion for hypotension   -Transfer to ICU   -Consulted and gave sign out to Intensivist Dr. Watt for hemodynamic mgmt. Pt is also on continuous bipap and may require re intubation given clinical picture    -Updated daughter Caroline. She is POA. Tearful and deferred further goals of care conversation.     Working diagnosis:    At the end of the RRT pt transferred to ICU with flying squad.     Disposition ICU     Discussed with and defer further cares to hospitalist attending Dr. Wood and and Dr. Watt.    Interval History     Supriya Melissa a 75 year old female w/ PMH of significant for ESRD on HD, CHF, COPD, poor mobility (Lt AKA, Obesity, WC bound), DM type II, hypertension. She was brought to the ER by EMS for evaluation of shortness of breath, diagnosed with influenza A and admitted on 1/8/25     Code Status: Full Code    Allergies   Allergies   Allergen Reactions    Ampicillin-Sulbactam Sodium Rash     No  evidence SJS, but very uncomfortable and precipitated multiple provider visits. Would not use penicillins again if other options available.     Penicillins Rash       Physical Exam   Vital Signs with Ranges:  Temp:  [97.4  F (36.3  C)-98.7  F (37.1  C)] 98  F (36.7  C)  Pulse:  [57-87] 61  Resp:  [18-40] 22  BP: ()/(36-73) 97/73  FiO2 (%):  [30 %] 30 %  SpO2:  [92 %-99 %] 93 %  I/O last 3 completed shifts:  In: 1185 [P.O.:150; NG/GT:1035]  Out: 3000 [Other:3000]    Physical Exam  HENT:      Mouth/Throat:      Mouth: Mucous membranes are moist.   Eyes:      Pupils: Pupils are equal, round, and reactive to light.   Cardiovascular:      Rate and Rhythm: Regular rhythm. Bradycardia present.   Abdominal:      General: Bowel sounds are normal. There is no distension.      Palpations: Abdomen is soft.      Tenderness: There is no abdominal tenderness.   Skin:     General: Skin is warm and dry.      Capillary Refill: Capillary refill takes less than 2 seconds.      Findings: Bruising present.      Comments: Right IJ and LUE fistula    Neurological:      Mental Status: She is alert and oriented to person, place, and time.   Psychiatric:         Mood and Affect: Mood normal.         Behavior: Behavior normal.         Thought Content: Thought content normal.         Data     EKG:  Interpreted by Gracie Ni NP  Time reviewed: 1728  Symptoms at time of EKG: None   Rhythm: sinus bradycardia  Rate: Bradycardia and 30-40  Axis: Normal  Ectopy: none  Conduction: normal  ST Segments/ T Waves: No acute ischemic changes  Q Waves: none  Comparison to prior: changed from 2/2/25    Clinical Impression: sinus bradycardia with sinus pauses       ABG:  Recent Labs   Lab 02/05/25  1733   PH 7.42   PO2 78*   PCO2 49*   HCO3 32*   LACIE 6.7*       IMAGING: (X-ray/CT/MRI)   Recent Results (from the past 24 hours)   XR Chest Port 1 View    Narrative    EXAM: XR CHEST PORT 1 VIEW  LOCATION: [facility]  DATE:  2/5/2025    INDICATION: Shortness of breath.  COMPARISON: 2/1/2025.      Impression    IMPRESSION: Increased mild hazy opacities that may be mildly increased edema. Atypical pneumonia remains a possibility particularly at the right upper lung. Stable opacity at the left lung base that may be ongoing airspace disease or atelectasis. Stable   cardiomegaly. Stable right neck central venous line tip at the right atrium. If relevant, this could be retracted by approximately 3.3 cm for placement at the cavoatrial level. Enteric feeding tube is again noted. Likely small basilar pleural fluid   appears stable.       CBC with Diff:  Recent Labs   Lab Test 02/05/25  1736 06/03/24  1644 04/24/24  0913   WBC 6.5   < > 6.3   HGB 7.2*   < > 10.1*   *   < > 101*      < > 139*   INR  --   --  1.11    < > = values in this interval not displayed.        Comprehensive Metabolic Panel:  Recent Labs   Lab 02/05/25  1736 02/05/25  1249 02/05/25  0631 02/03/25  0950 02/03/25  0453 02/02/25  0814 02/02/25  0542   *  --  134*   < > 136  --  138   POTASSIUM 5.9*  --  4.9   < > 5.7*  --  4.9   CHLORIDE 92*  --  95*   < > 99  --  99   CO2 29  --  30*   < > 29  --  30*   ANIONGAP 11  --  9   < > 8  --  9   *   < > 174*   < > 158*   < > 211*   BUN 63.0*  --  49.2*   < > 58.9*  --  36.2*   CR 3.38*  --  2.91*   < > 3.49*  --  2.38*   GFRESTIMATED 13*  --  16*   < > 13*  --  21*   JODY 9.8  --  9.5   < > 9.8  --  9.5   MAG  --   --  2.2   < > 2.2  --  2.1   PHOS  --   --   --   --  2.6  --   --    PROTTOTAL  --   --   --   --   --   --  5.6*   ALBUMIN  --   --   --   --   --   --  3.2*   BILITOTAL  --   --   --   --   --   --  0.2   ALKPHOS  --   --   --   --   --   --  55   AST  --   --   --   --   --   --  12   ALT  --   --   --   --   --   --  22    < > = values in this interval not displayed.       Time Spent on this Encounter   I spent 60 minutes (1284 - 5290) of critical care time on the unit/floor managing the care  of Supriya Herr. Upon evaluation, this patient had a high probability of imminent or life-threatening deterioration due to cardiogenic shock, which required my direct attention, intervention, and personal management. 100% of my time was spent at the bedside counseling the patient and/or coordinating care regarding services listed in this note.    Gracie Ni NP  [facility]  Securely message with the Vocera Web Console (learn more here)  Text page via [email or web address] Paging/Directory

## 2025-02-05 NOTE — PROGRESS NOTES
Chart reviewed. Labs and vitals reviewed.   Patient seen briefly.  She received Ativan earlier and is sleeping now.  Breathing better.  On 1 L nasal cannula  Plan for HD tomorrow.  3-4 L ultrafiltration as tolerated    Recent Labs   Lab Test 02/05/25  0631 02/05/25  0402 02/04/25  0808 02/04/25  0624   *  --   --  135   POTASSIUM 4.9  --   --  6.4*   CHLORIDE 95*  --   --  98   CO2 30*  --   --  27   ANIONGAP 9  --   --  10   * 160*   < > 178*   BUN 49.2*  --   --  77.8*   CR 2.91*  --   --  4.44*   JODY 9.5  --   --  9.7    < > = values in this interval not displayed.       Davide Madrid MD  [facility] - Nephrology   [phone]

## 2025-02-05 NOTE — PROGRESS NOTES
[facility]    Medicine Progress Note - Hospitalist Service    Date of Admission:  1/8/2025    Assessment & Plan   Supriya Herr is a 74 year old female with PMHx significant for ESRD on HD, CHF, COPD, poor mobility (Lt AKA, Obesity, WC bound), DM type II, hypertension.  She was brought to the ER by EMS for evaluation of shortness of breath, diagnosed with influenza A and admitted on 1/8/25      Hospital course summary  Patient was admitted, subsequently intubated for worsening respiratory failure and remained on mechanical ventilator 1/9 - 1/18.  She was reintubated and remained on mechanical ventilator 1/20 - 1/25.  Was treated with Tamiflu, IV antibiotic, steroid, has completed courses.  Hospital course complicated by A-fib RVR.  Was on amiodarone drip with eventual transition to p.o. Also required diltiazem  for rate control.  Required BiPAP--HFNC--intermittent BiPAP use.  Ongoing tube feeding and modified diet per SLP.  Delirium managed with Seroquel.  Concern for recurrent pneumonia, hospital-acquired on 2/1, restarted on antibiotics.  ID consulted, recommended discontinuing antibiotics, respiratory failure likely related to fluid overload.    Acute hypoxic respiratory failure  Influenza A pneumonia  Acute COPD exacerbation  Hospital-acquired pneumonia  Pharyngeal edema  Initial presentation with shortness of breath, diagnosed influenza A. Initial course as noted above.  -Completed Tamiflu.  -Had bronchoscopy and BAL 1/20, culture grew actinomyces, CT chest showed RML infiltrate versus atelectasis but no sign of actinomycosis per intensivist and felt this was likely colonization.  Pneumonia treated with cefepime and vancomycin, cefepime changed to Levaquin and completed antibiotic course 1/26.  -Was treated with IV steroid, completed course.  -Continue nebs-budesonide 1 Mg twice daily, DuoNeb 4 times daily, as needed albuterol.  -Weaned down to oxymask on 1/30/25.  Still requiring  intermittent BiPAP.    -2/1: Increased hypoxia and shortness of breath on the morning of 2/1/25.  WBC within normal limits.  Afebrile.  VBG OK.  -Concern for RUL pneumonia, started empiric antibiotics-vancomycin and levofloxacin.  Repeat blood cultures on 2/1 NGTD.  Procalcitonin returned elevated at 3.28 on 2/2.  However no prior baseline this hospitalization. CT chest on 2/3 also concerning for RUL pneumonia.  -Consulted ID given recurrent pneumonia, has had multiple antibiotics this hospitalization.  Appreciate their evaluation.  They recommended discontinuing antibiotics, respiratory failure at this time more likely related to fluid overload.   - Patient has continued to have worsening SOB despite dialysis, multiple antibiotic treatments. She is almost now BIPAP dependent. Tachypneic up to the 40s at rest and requiring more supplemental O2 at night on BiPAP.  Chest x-ray obtained revealed worsening pulmonary edema as well as persistent right upper lobe infiltrates possibly atypical pneumonia.  In review of epic, patient has had several antibiotics to treat this, will trial antibiotics targeted for atypical pneumonia.  Patient will continue to have dialysis to extract fluid        Atrial fibrillation with RVR  Acute on chronic CHFpEF  Hypertension  Shock, due to sedation  PTA is on amlodipine 5 Mg daily, Coreg 25 Mg p.o. twice daily and Lasix.  -Was started on amiodarone drip, transitioned to p.o. on 1/27/25 with plan for a 14 day course.  -Managed with diltiazem drip.  This was transitioned to p.o. diltiazem on 2/3.  -Resumed PTA carvedilol at a lower dose on 1/31/25, increased back to PTA dosing of 25 Mg twice daily on 2/2  -Continue with Lipitor 20 Mg daily.  -On apixaban 5 Mg twice daily.  -TTE this is stay shows LVEF 60%, no pericardial effusion, no significant valvular disease, diastolic function indeterminate.  No WMA.  -Cardiology followed previously, signed off 1/21/25.  Consider reconsulting if rate  remains difficult to control after resuming and titrating up carvedilol.  -PTA Lasix on hold, patient is anuric, managing fluid with hemodialysis.  Last BNP was around 50K on 1/20.  Recheck BNP on 2/2 around 41K.  -Monitor daily weight and intake and output as able.  -Discussed with nephrology on 2/3 regarding PTA Lasix on hold-they have resumed Lasix daily on nondialysis days.    Diabetes mellitus, type 2  HbA1c 6.5% on 1/17/25.  PTA is on Lantus 18 units daily and NovoLog 5 units 3 times daily.  -Currently on Lantus 15 units daily.  -ISS.  -Blood sugars fairly well controlled.  Monitor as diet is advanced and tube feedings are discontinued, will likely need to adjust insulin regimen.    ESRD on HD Tue-Thu-Sat, anuric  Anemia of chronic disease  Hyperkalemia  -Nephrology following-managing dialysis needs  -Next dialysis on 2/4.  Apparently patient was dyspneic even after dialysis the previous night and needed to be put back on BiPAP.  -Daily renal panel.  -Hemoglobin 7-8, at baseline.  -Gets EPO with hemodialysis.  -Continue sevelamer and vitamin D3.  -Nephrology resumed Lasix on nondialysis days on 2/3.  Also giving Lokelma for hyperkalemia.      Constipation  -Noted on CT 2/2  -Placed on bowel regimen    Encephalopathy, improved  Anxiety  Had CT head 1/20, negative for acute intracranial process.  Volume loss and chronic microvascular ischemic changes noted.  Suspected some baseline cognitive impairment.  No agitation.  -Continue Seroquel 25 Mg every morning and 50 Mg every afternoon.  -Continue PTA Zoloft and gabapentin.  -Delirium precautions.  -PT, OT eval.  -Social work consult for discharge planning.    Dysphagia  Suspect due to recurrent intubation.  -Dietitian consulted.  -Continue tube feedings for now.  -Did well with video swallow study on 1/31/25, diet is being advanced.  -will start cycling tube feeds.           Diet: Room Service  Combination Diet Pureed Diet (level 4); Thin Liquids (level 0)  "(upright, upper dentures in, straws OK)  Adult Formula Drip Feeding: Continuous [employer] Standard 1.4; Nasoduodenal tube; Goal Rate: 55; mL/hr; From: 4:00 PM; To: 6:00 AM; OK to restart nocturnal cycle at goal rate of 55 ml/hr    DVT Prophylaxis: DOAC  Bradshaw Catheter: Not present  Lines: PRESENT      CVC Triple Lumen Right Internal jugular-Site Assessment: WDL  Hemodialysis Vascular Access AV fistula Superior Arm-Site Assessment: WDL      Cardiac Monitoring: None  Code Status: Full Code      Clinically Significant Risk Factors        # Hyperkalemia: Highest K = 6.4 mmol/L in last 2 days, will monitor as appropriate  # Hyponatremia: Lowest Na = 134 mmol/L in last 2 days, will monitor as appropriate  # Hypochloremia: Lowest Cl = 95 mmol/L in last 2 days, will monitor as appropriate      # Hypoalbuminemia: Lowest albumin = 2.8 g/dL at 1/13/2025  5:14 AM, will monitor as appropriate     # Hypertension: Noted on problem list    # Acute heart failure with preserved ejection fraction: heart failure noted on problem list, last echo with EF >50%, and receiving IV diuretics    # Acute Hypercapnic Respiratory Failure: based on venous blood gas results.  Continue supplemental oxygen and ventilatory support as indicated.        # DMII: A1C = 6.5 % (Ref range: <5.7 %) within past 6 months   # Obesity: Estimated body mass index is 37.39 kg/m  as calculated from the following:    Height as of this encounter: 1.702 m (5' 7.01\").    Weight as of this encounter: 108.3 kg (238 lb 12.1 oz).        # Financial/Environmental Concerns: none         Social Drivers of Health    Tobacco Use: Medium Risk (9/11/2024)    Patient History     Smoking Tobacco Use: Former     Smokeless Tobacco Use: Never   Physical Activity: Insufficiently Active (7/19/2024)    Exercise Vital Sign     Days of Exercise per Week: 1 day     Minutes of Exercise per Session: 10 min   Social Connections: Unknown (7/19/2024)    Social Connection and Isolation Panel " [NHANES]     Frequency of Social Gatherings with Friends and Family: More than three times a week          Disposition Plan     Medically Ready for Discharge: Anticipated in 2-4 Days pending improvement in respiratory status             Viivan Wood MD  Hospitalist Service  [facility]  Securely message with [employer] (more info)  Text page via [employer] Paging/Directory   ______________________________________________________________________    Interval History   Notified by nursing staff the patient continues to have worsening shortness of breath when off the BiPAP.  Denies any chest pain.  She does endorse lower abdominal pain.  Aspirin as she has not had a bowel movement in a few days.  Will work on treating her constipation.    Physical Exam   Vital Signs: Temp: 97.4  F (36.3  C) Temp src: Axillary BP: (!) 162/61 Pulse: 76   Resp: (!) 40 SpO2: 97 % O2 Device: BiPAP/CPAP Oxygen Delivery: 1 LPM  Weight: 238 lbs 12.13 oz    General Appearance: Well appearing for stated age.  Respiratory: CTAB, no rales or ronchi  Cardiovascular: S1, S2 normal, no murmurs  GI: non-tender on palpation, BS present      Medical Decision Making       55 MINUTES SPENT BY ME on the date of service doing chart review, history, exam, documentation & further activities per the note.      Data     I have personally reviewed the following data over the past 24 hrs:    N/A  \   N/A   / N/A     134 (L) 95 (L) 49.2 (H) /  126 (H)   4.9 30 (H) 2.91 (H) \       Imaging results reviewed over the past 24 hrs:   Recent Results (from the past 24 hours)   XR Chest Port 1 View    Narrative    EXAM: XR CHEST PORT 1 VIEW  LOCATION: [facility]  DATE: 2/5/2025    INDICATION: Shortness of breath.  COMPARISON: 2/1/2025.      Impression    IMPRESSION: Increased mild hazy opacities that may be mildly increased edema. Atypical pneumonia remains a possibility particularly at the right upper lung. Stable opacity at the  left lung base that may be ongoing airspace disease or atelectasis. Stable   cardiomegaly. Stable right neck central venous line tip at the right atrium. If relevant, this could be retracted by approximately 3.3 cm for placement at the cavoatrial level. Enteric feeding tube is again noted. Likely small basilar pleural fluid   appears stable.

## 2025-02-05 NOTE — PLAN OF CARE
More alert this afternoon, this am lethargic, this afternoon a&O x4. Pt denied pain. VSS, on 3L NC. T/R as tolerated. Tele: Converted to SR around 1548. Internal jugular dressing changed. TF changed to cyclic feeds ([id number]) restarted at 1800 due to pt receiving dialysis. Did eat 75% of dinner and was able to take pills PO w/ applesauce. Alarms on. Continue to monitor.

## 2025-02-05 NOTE — SIGNIFICANT EVENT
Significant Event Note    Time of event: 1:30 AM February 5, 2025    Description of event:  Patient with restlessness and agitation overnight while on bipap. Will trial ativan prn.      Margoth Petersen MD

## 2025-02-06 ENCOUNTER — APPOINTMENT (OUTPATIENT)
Dept: GENERAL RADIOLOGY | Facility: CLINIC | Age: 75
DRG: 207 | End: 2025-02-06
Attending: INTERNAL MEDICINE
Payer: COMMERCIAL

## 2025-02-06 VITALS
RESPIRATION RATE: 31 BRPM | BODY MASS INDEX: 33.94 KG/M2 | TEMPERATURE: 97.4 F | HEART RATE: 70 BPM | SYSTOLIC BLOOD PRESSURE: 118 MMHG | OXYGEN SATURATION: 100 % | DIASTOLIC BLOOD PRESSURE: 47 MMHG | WEIGHT: 216.27 LBS | HEIGHT: 67 IN

## 2025-02-06 LAB
ANION GAP SERPL CALCULATED.3IONS-SCNC: 11 MMOL/L (ref 7–15)
ANION GAP SERPL CALCULATED.3IONS-SCNC: 11 MMOL/L (ref 7–15)
ATRIAL RATE - MUSE: 205 BPM
ATRIAL RATE - MUSE: 42 BPM
ATRIAL RATE - MUSE: 71 BPM
BACTERIA BLD CULT: NO GROWTH
BACTERIA BLD CULT: NO GROWTH
BACTERIA BRONCH: NORMAL
BACTERIA BRONCH: NORMAL
BASE EXCESS BLDV CALC-SCNC: 4.1 MMOL/L (ref -3–3)
BASE EXCESS BLDV CALC-SCNC: 4.6 MMOL/L (ref -3–3)
BASE EXCESS BLDV CALC-SCNC: 7.4 MMOL/L (ref -3–3)
BUN SERPL-MCNC: 76.6 MG/DL (ref 8–23)
BUN SERPL-MCNC: 77.9 MG/DL (ref 8–23)
CALCIUM SERPL-MCNC: 9.8 MG/DL (ref 8.8–10.4)
CALCIUM SERPL-MCNC: 9.9 MG/DL (ref 8.8–10.4)
CHLORIDE SERPL-SCNC: 94 MMOL/L (ref 98–107)
CHLORIDE SERPL-SCNC: 94 MMOL/L (ref 98–107)
CORTIS SERPL-MCNC: 20.7 UG/DL
CREAT SERPL-MCNC: 3.88 MG/DL (ref 0.51–0.95)
CREAT SERPL-MCNC: 3.95 MG/DL (ref 0.51–0.95)
DIASTOLIC BLOOD PRESSURE - MUSE: NORMAL MMHG
EGFRCR SERPLBLD CKD-EPI 2021: 11 ML/MIN/1.73M2
EGFRCR SERPLBLD CKD-EPI 2021: 11 ML/MIN/1.73M2
ERYTHROCYTE [DISTWIDTH] IN BLOOD BY AUTOMATED COUNT: 16.7 % (ref 10–15)
GLUCOSE BLDC GLUCOMTR-MCNC: 102 MG/DL (ref 70–99)
GLUCOSE BLDC GLUCOMTR-MCNC: 109 MG/DL (ref 70–99)
GLUCOSE BLDC GLUCOMTR-MCNC: 113 MG/DL (ref 70–99)
GLUCOSE BLDC GLUCOMTR-MCNC: 116 MG/DL (ref 70–99)
GLUCOSE BLDC GLUCOMTR-MCNC: 126 MG/DL (ref 70–99)
GLUCOSE BLDC GLUCOMTR-MCNC: 141 MG/DL (ref 70–99)
GLUCOSE BLDC GLUCOMTR-MCNC: 167 MG/DL (ref 70–99)
GLUCOSE BLDC GLUCOMTR-MCNC: 188 MG/DL (ref 70–99)
GLUCOSE BLDC GLUCOMTR-MCNC: 228 MG/DL (ref 70–99)
GLUCOSE BLDC GLUCOMTR-MCNC: 275 MG/DL (ref 70–99)
GLUCOSE BLDC GLUCOMTR-MCNC: 276 MG/DL (ref 70–99)
GLUCOSE BLDC GLUCOMTR-MCNC: 295 MG/DL (ref 70–99)
GLUCOSE BLDC GLUCOMTR-MCNC: 360 MG/DL (ref 70–99)
GLUCOSE BLDC GLUCOMTR-MCNC: 86 MG/DL (ref 70–99)
GLUCOSE BLDC GLUCOMTR-MCNC: 89 MG/DL (ref 70–99)
GLUCOSE SERPL-MCNC: 233 MG/DL (ref 70–99)
GLUCOSE SERPL-MCNC: 77 MG/DL (ref 70–99)
HBV SURFACE AG SERPL QL IA: NONREACTIVE
HCO3 BLDV-SCNC: 31 MMOL/L (ref 21–28)
HCO3 BLDV-SCNC: 33 MMOL/L (ref 21–28)
HCO3 BLDV-SCNC: 33 MMOL/L (ref 21–28)
HCO3 SERPL-SCNC: 28 MMOL/L (ref 22–29)
HCO3 SERPL-SCNC: 29 MMOL/L (ref 22–29)
HCT VFR BLD AUTO: 25 % (ref 35–47)
HGB BLD-MCNC: 7.7 G/DL (ref 11.7–15.7)
INTERPRETATION ECG - MUSE: NORMAL
LACTATE SERPL-SCNC: 0.5 MMOL/L (ref 0.7–2)
MAGNESIUM SERPL-MCNC: 2.3 MG/DL (ref 1.7–2.3)
MCH RBC QN AUTO: 32.1 PG (ref 26.5–33)
MCHC RBC AUTO-ENTMCNC: 30.8 G/DL (ref 31.5–36.5)
MCV RBC AUTO: 104 FL (ref 78–100)
O2/TOTAL GAS SETTING VFR VENT: 0 %
O2/TOTAL GAS SETTING VFR VENT: 0 %
O2/TOTAL GAS SETTING VFR VENT: 30 %
OXYHGB MFR BLDV: 79 % (ref 70–75)
OXYHGB MFR BLDV: 81 % (ref 70–75)
OXYHGB MFR BLDV: 84 % (ref 70–75)
P AXIS - MUSE: -79 DEGREES
P AXIS - MUSE: 38 DEGREES
P AXIS - MUSE: 61 DEGREES
PCO2 BLDV: 54 MM HG (ref 40–50)
PCO2 BLDV: 57 MM HG (ref 40–50)
PCO2 BLDV: 80 MM HG (ref 40–50)
PH BLDV: 7.22 [PH] (ref 7.32–7.43)
PH BLDV: 7.34 [PH] (ref 7.32–7.43)
PH BLDV: 7.4 [PH] (ref 7.32–7.43)
PLATELET # BLD AUTO: 199 10E3/UL (ref 150–450)
PO2 BLDV: 49 MM HG (ref 25–47)
PO2 BLDV: 54 MM HG (ref 25–47)
PO2 BLDV: 55 MM HG (ref 25–47)
POTASSIUM SERPL-SCNC: 4 MMOL/L (ref 3.4–5.3)
POTASSIUM SERPL-SCNC: 5.4 MMOL/L (ref 3.4–5.3)
POTASSIUM SERPL-SCNC: 5.4 MMOL/L (ref 3.4–5.3)
POTASSIUM SERPL-SCNC: 5.7 MMOL/L (ref 3.4–5.3)
POTASSIUM SERPL-SCNC: 5.9 MMOL/L (ref 3.4–5.3)
PR INTERVAL - MUSE: 126 MS
PR INTERVAL - MUSE: 192 MS
PR INTERVAL - MUSE: NORMAL MS
QRS DURATION - MUSE: 86 MS
QRS DURATION - MUSE: 88 MS
QRS DURATION - MUSE: 94 MS
QT - MUSE: 228 MS
QT - MUSE: 396 MS
QT - MUSE: 484 MS
QTC - MUSE: 288 MS
QTC - MUSE: 389 MS
QTC - MUSE: 430 MS
R AXIS - MUSE: 68 DEGREES
R AXIS - MUSE: 71 DEGREES
R AXIS - MUSE: 87 DEGREES
RBC # BLD AUTO: 2.4 10E6/UL (ref 3.8–5.2)
SAO2 % BLDV: 81 % (ref 70–75)
SAO2 % BLDV: 82.7 % (ref 70–75)
SAO2 % BLDV: 86.1 % (ref 70–75)
SODIUM SERPL-SCNC: 133 MMOL/L (ref 135–145)
SODIUM SERPL-SCNC: 134 MMOL/L (ref 135–145)
SYSTOLIC BLOOD PRESSURE - MUSE: NORMAL MMHG
T AXIS - MUSE: 195 DEGREES
T AXIS - MUSE: 32 DEGREES
T AXIS - MUSE: 56 DEGREES
TROPONIN T SERPL HS-MCNC: 75 NG/L
VENTRICULAR RATE- MUSE: 39 BPM
VENTRICULAR RATE- MUSE: 71 BPM
VENTRICULAR RATE- MUSE: 96 BPM
WBC # BLD AUTO: 9.2 10E3/UL (ref 4–11)

## 2025-02-06 PROCEDURE — 82805 BLOOD GASES W/O2 SATURATION: CPT | Performed by: NURSE PRACTITIONER

## 2025-02-06 PROCEDURE — 87340 HEPATITIS B SURFACE AG IA: CPT | Performed by: INTERNAL MEDICINE

## 2025-02-06 PROCEDURE — 71045 X-RAY EXAM CHEST 1 VIEW: CPT

## 2025-02-06 PROCEDURE — 93010 ELECTROCARDIOGRAM REPORT: CPT | Performed by: INTERNAL MEDICINE

## 2025-02-06 PROCEDURE — 82435 ASSAY OF BLOOD CHLORIDE: CPT | Performed by: HOSPITALIST

## 2025-02-06 PROCEDURE — 80048 BASIC METABOLIC PNL TOTAL CA: CPT | Performed by: NURSE PRACTITIONER

## 2025-02-06 PROCEDURE — 250N000013 HC RX MED GY IP 250 OP 250 PS 637: Performed by: NURSE PRACTITIONER

## 2025-02-06 PROCEDURE — 250N000009 HC RX 250: Performed by: NURSE PRACTITIONER

## 2025-02-06 PROCEDURE — 94640 AIRWAY INHALATION TREATMENT: CPT | Mod: 76

## 2025-02-06 PROCEDURE — 250N000013 HC RX MED GY IP 250 OP 250 PS 637: Performed by: HOSPITALIST

## 2025-02-06 PROCEDURE — 258N000003 HC RX IP 258 OP 636: Performed by: INTERNAL MEDICINE

## 2025-02-06 PROCEDURE — 82435 ASSAY OF BLOOD CHLORIDE: CPT | Performed by: INTERNAL MEDICINE

## 2025-02-06 PROCEDURE — 99222 1ST HOSP IP/OBS MODERATE 55: CPT | Performed by: INTERNAL MEDICINE

## 2025-02-06 PROCEDURE — 94640 AIRWAY INHALATION TREATMENT: CPT

## 2025-02-06 PROCEDURE — 200N000001 HC R&B ICU

## 2025-02-06 PROCEDURE — 83735 ASSAY OF MAGNESIUM: CPT | Performed by: HOSPITALIST

## 2025-02-06 PROCEDURE — 99291 CRITICAL CARE FIRST HOUR: CPT | Performed by: INTERNAL MEDICINE

## 2025-02-06 PROCEDURE — 250N000009 HC RX 250: Performed by: INTERNAL MEDICINE

## 2025-02-06 PROCEDURE — 250N000013 HC RX MED GY IP 250 OP 250 PS 637: Performed by: INTERNAL MEDICINE

## 2025-02-06 PROCEDURE — 82805 BLOOD GASES W/O2 SATURATION: CPT | Performed by: INTERNAL MEDICINE

## 2025-02-06 PROCEDURE — 93005 ELECTROCARDIOGRAM TRACING: CPT

## 2025-02-06 PROCEDURE — 634N000001 HC RX 634: Mod: JZ | Performed by: INTERNAL MEDICINE

## 2025-02-06 PROCEDURE — 84520 ASSAY OF UREA NITROGEN: CPT | Performed by: HOSPITALIST

## 2025-02-06 PROCEDURE — 83605 ASSAY OF LACTIC ACID: CPT | Performed by: INTERNAL MEDICINE

## 2025-02-06 PROCEDURE — 94660 CPAP INITIATION&MGMT: CPT

## 2025-02-06 PROCEDURE — 84484 ASSAY OF TROPONIN QUANT: CPT | Performed by: INTERNAL MEDICINE

## 2025-02-06 PROCEDURE — 84132 ASSAY OF SERUM POTASSIUM: CPT | Performed by: INTERNAL MEDICINE

## 2025-02-06 PROCEDURE — 999N000157 HC STATISTIC RCP TIME EA 10 MIN

## 2025-02-06 PROCEDURE — 85018 HEMOGLOBIN: CPT | Performed by: INTERNAL MEDICINE

## 2025-02-06 PROCEDURE — 82565 ASSAY OF CREATININE: CPT | Performed by: INTERNAL MEDICINE

## 2025-02-06 PROCEDURE — 250N000012 HC RX MED GY IP 250 OP 636 PS 637: Performed by: NURSE PRACTITIONER

## 2025-02-06 PROCEDURE — 82533 TOTAL CORTISOL: CPT | Performed by: INTERNAL MEDICINE

## 2025-02-06 PROCEDURE — 250N000009 HC RX 250: Performed by: HOSPITALIST

## 2025-02-06 PROCEDURE — 99233 SBSQ HOSP IP/OBS HIGH 50: CPT | Performed by: INTERNAL MEDICINE

## 2025-02-06 PROCEDURE — 84132 ASSAY OF SERUM POTASSIUM: CPT | Performed by: NURSE PRACTITIONER

## 2025-02-06 PROCEDURE — 90937 HEMODIALYSIS REPEATED EVAL: CPT

## 2025-02-06 PROCEDURE — 258N000001 HC RX 258: Performed by: NURSE PRACTITIONER

## 2025-02-06 PROCEDURE — 258N000003 HC RX IP 258 OP 636: Performed by: NURSE PRACTITIONER

## 2025-02-06 RX ORDER — DEXTROSE MONOHYDRATE 25 G/50ML
50 INJECTION, SOLUTION INTRAVENOUS ONCE
Status: COMPLETED | OUTPATIENT
Start: 2025-02-06 | End: 2025-02-06

## 2025-02-06 RX ORDER — NOREPINEPHRINE BITARTRATE 0.02 MG/ML
.01-.6 INJECTION, SOLUTION INTRAVENOUS CONTINUOUS
Status: DISCONTINUED | OUTPATIENT
Start: 2025-02-06 | End: 2025-02-09

## 2025-02-06 RX ORDER — FUROSEMIDE 40 MG/1
80 TABLET ORAL DAILY
Status: DISCONTINUED | OUTPATIENT
Start: 2025-02-07 | End: 2025-02-26 | Stop reason: HOSPADM

## 2025-02-06 RX ORDER — AMIODARONE HYDROCHLORIDE 200 MG/1
200 TABLET ORAL DAILY
Status: DISCONTINUED | OUTPATIENT
Start: 2025-02-06 | End: 2025-02-10

## 2025-02-06 RX ORDER — NICOTINE POLACRILEX 4 MG
15-30 LOZENGE BUCCAL
Status: DISCONTINUED | OUTPATIENT
Start: 2025-02-06 | End: 2025-02-06

## 2025-02-06 RX ORDER — DEXTROSE MONOHYDRATE 100 MG/ML
INJECTION, SOLUTION INTRAVENOUS CONTINUOUS PRN
Status: DISCONTINUED | OUTPATIENT
Start: 2025-02-06 | End: 2025-02-18

## 2025-02-06 RX ORDER — DEXTROSE MONOHYDRATE 25 G/50ML
25-50 INJECTION, SOLUTION INTRAVENOUS
Status: DISCONTINUED | OUTPATIENT
Start: 2025-02-06 | End: 2025-02-06

## 2025-02-06 RX ADMIN — SODIUM CHLORIDE 500 ML: 9 INJECTION, SOLUTION INTRAVENOUS at 12:44

## 2025-02-06 RX ADMIN — SODIUM ZIRCONIUM CYCLOSILICATE 10 G: 5 POWDER, FOR SUSPENSION ORAL at 10:31

## 2025-02-06 RX ADMIN — SENNOSIDES 10 ML: 8.8 LIQUID ORAL at 20:26

## 2025-02-06 RX ADMIN — SERTRALINE HYDROCHLORIDE 75 MG: 50 TABLET ORAL at 20:26

## 2025-02-06 RX ADMIN — Medication 50 MCG: at 08:28

## 2025-02-06 RX ADMIN — TACROLIMUS: 1 OINTMENT TOPICAL at 08:44

## 2025-02-06 RX ADMIN — Medication 60 ML: at 02:32

## 2025-02-06 RX ADMIN — SEVELAMER CARBONATE 800 MG: 800 TABLET, FILM COATED ORAL at 08:28

## 2025-02-06 RX ADMIN — IPRATROPIUM BROMIDE AND ALBUTEROL SULFATE 3 ML: .5; 3 SOLUTION RESPIRATORY (INHALATION) at 16:32

## 2025-02-06 RX ADMIN — SENNOSIDES 10 ML: 8.8 LIQUID ORAL at 08:28

## 2025-02-06 RX ADMIN — Medication 40 MG: at 08:29

## 2025-02-06 RX ADMIN — BUDESONIDE 1 MG: 1 INHALANT ORAL at 07:16

## 2025-02-06 RX ADMIN — TACROLIMUS: 1 OINTMENT TOPICAL at 21:42

## 2025-02-06 RX ADMIN — BUDESONIDE 1 MG: 1 INHALANT ORAL at 19:10

## 2025-02-06 RX ADMIN — AMIODARONE HYDROCHLORIDE 200 MG: 200 TABLET ORAL at 10:31

## 2025-02-06 RX ADMIN — SODIUM CHLORIDE 5 UNITS: 9 INJECTION, SOLUTION INTRAVENOUS at 02:19

## 2025-02-06 RX ADMIN — NOREPINEPHRINE BITARTRATE 0.01 MCG/KG/MIN: 0.02 INJECTION, SOLUTION INTRAVENOUS at 14:36

## 2025-02-06 RX ADMIN — Medication 60 ML: at 21:42

## 2025-02-06 RX ADMIN — APIXABAN 5 MG: 5 TABLET, FILM COATED ORAL at 08:28

## 2025-02-06 RX ADMIN — Medication 5 ML: at 08:29

## 2025-02-06 RX ADMIN — FUROSEMIDE 80 MG: 80 TABLET ORAL at 08:43

## 2025-02-06 RX ADMIN — MICONAZOLE NITRATE: 2 POWDER TOPICAL at 08:44

## 2025-02-06 RX ADMIN — Medication: at 12:45

## 2025-02-06 RX ADMIN — EPOETIN ALFA-EPBX 10000 UNITS: 10000 INJECTION, SOLUTION INTRAVENOUS; SUBCUTANEOUS at 12:45

## 2025-02-06 RX ADMIN — IPRATROPIUM BROMIDE AND ALBUTEROL SULFATE 3 ML: .5; 3 SOLUTION RESPIRATORY (INHALATION) at 19:07

## 2025-02-06 RX ADMIN — QUETIAPINE FUMARATE 25 MG: 25 TABLET ORAL at 08:27

## 2025-02-06 RX ADMIN — APIXABAN 5 MG: 5 TABLET, FILM COATED ORAL at 20:26

## 2025-02-06 RX ADMIN — ACETAMINOPHEN 650 MG: 325 TABLET, FILM COATED ORAL at 17:40

## 2025-02-06 RX ADMIN — INSULIN GLARGINE 15 UNITS: 100 INJECTION, SOLUTION SUBCUTANEOUS at 21:43

## 2025-02-06 RX ADMIN — MICONAZOLE NITRATE: 2 POWDER TOPICAL at 21:42

## 2025-02-06 RX ADMIN — CETIRIZINE HYDROCHLORIDE 5 MG: 5 TABLET ORAL at 21:51

## 2025-02-06 RX ADMIN — DEXTROSE MONOHYDRATE 50 ML: 25 INJECTION, SOLUTION INTRAVENOUS at 02:18

## 2025-02-06 RX ADMIN — SODIUM CHLORIDE 200 ML: 9 INJECTION, SOLUTION INTRAVENOUS at 12:43

## 2025-02-06 RX ADMIN — ATORVASTATIN CALCIUM 20 MG: 20 TABLET, FILM COATED ORAL at 20:26

## 2025-02-06 RX ADMIN — INSULIN HUMAN 3.5 UNITS/HR: 1 INJECTION, SOLUTION INTRAVENOUS at 02:26

## 2025-02-06 RX ADMIN — Medication 60 ML: at 08:31

## 2025-02-06 RX ADMIN — IPRATROPIUM BROMIDE AND ALBUTEROL SULFATE 3 ML: .5; 3 SOLUTION RESPIRATORY (INHALATION) at 07:16

## 2025-02-06 RX ADMIN — SODIUM CHLORIDE 250 ML: 9 INJECTION, SOLUTION INTRAVENOUS at 12:44

## 2025-02-06 RX ADMIN — IPRATROPIUM BROMIDE AND ALBUTEROL SULFATE 3 ML: .5; 3 SOLUTION RESPIRATORY (INHALATION) at 11:41

## 2025-02-06 RX ADMIN — SODIUM ZIRCONIUM CYCLOSILICATE 10 G: 5 POWDER, FOR SUSPENSION ORAL at 16:51

## 2025-02-06 RX ADMIN — POLYETHYLENE GLYCOL 3350 17 G: 17 POWDER, FOR SOLUTION ORAL at 08:28

## 2025-02-06 ASSESSMENT — ACTIVITIES OF DAILY LIVING (ADL)
ADLS_ACUITY_SCORE: 80

## 2025-02-06 NOTE — PROGRESS NOTES
Brief progress note: Updated daughter (Caroline) around 11:50 am. Answered her questions. Caroline explained that her mother is spiritual and may benefit from a spiritual health consult to company/comfort given lengthy hospital stay.    Gracie Gracia MS4  [facility]  12:01 PM  02/06/25

## 2025-02-06 NOTE — CONSULTS
Cardiology Progress Note          Assessment and Plan:       Acute and chronic respiratory failure with hypoxia (H)    Type 2 diabetes mellitus with diabetic chronic kidney disease (H)    ESRD on dialysis (H)    ESRD (end stage renal disease) on dialysis (H)    Influenza A    Elevated troponin    Abnormal chest x-ray    Anemia, unspecified type    Acute metabolic encephalopathy    Severe sepsis with acute organ dysfunction (H)    Pleural effusion    Bradycardia secondary to cardiovascular medications after conversion  Continue amiodarone only at this point.  Decrease to 200 daily  Normal LVEF  Mild fluid overload.  Dialysis for fluid removal to euvolemic state.                Interval History:     Supriya is a 75-year-old with a long complex medical history and hospital course has end-stage renal disease on dialysis history of congestive heart failure COPD pulm mobility left AKA, obesity, diabetes hypertension was hospitalized since January 8 due to influenza A and respiratory failure requiring intubation and mechanical ventilation.  She is also treated with antibiotics presumed bacterial pneumonia and course further complicated by COPD exacerbation atrial fibrillation with rapid ventricular response and congestive heart failure.  She was seen and left that earlier in this admission by Dr. Valdovinos who subsequently placed her on multiple agents due to difficulty controlling heart rate with including amiodarone 200 twice daily Coreg 25 twice daily and Cardizem 60 Q6.  She converted yesterday and became sinus bradycardia with heart rate in the 30s and cardiology was reconsulted.  Maxwell is referred to Dr. Valdovinos's note.  Her previous echocardiogram showed normal left-ventricular systolic function normal valvular function.  She denies any chest pain chest pressure shortness of breath.  She was transferred for RRT due to respiratory failure felt likely secondary to CHF and fluid overload.  I think there is a significant  "contribution of bradycardia as well.  Holding the heart rate slowing medications upon my seeing the patient in the ICU approxi-9 AM patient staying in normal sinus rhythm the heart rate in the 70s.  Weight is at 216 pounds.  Her weight when last seen by cardiology 121 was at 211 pounds.              Review of Systems:   As per subjective, otherwise 5 systems reviewed and negative.           Physical Exam:   Blood pressure 124/61, pulse 70, temperature 98.4  F (36.9  C), temperature source Oral, resp. rate (!) 31, height 1.702 m (5' 7.01\"), weight 98.1 kg (216 lb 4.3 oz), SpO2 100%, not currently breastfeeding.      Vital Sign Ranges  Temperature Temp  Av.8  F (36.6  C)  Min: 97.4  F (36.3  C)  Max: 98.4  F (36.9  C)   Blood pressure Systolic (24hrs), Av , Min:72 , Max:162        Diastolic (24hrs), Av, Min:39, Max:91      Pulse Pulse  Av.4  Min: 46  Max: 76   Respirations Resp  Av.4  Min: 22  Max: 40   Pulse oximetry SpO2  Av.9 %  Min: 88 %  Max: 100 %         Intake/Output Summary (Last 24 hours) at 2025 0846  Last data filed at 2025 0800  Gross per 24 hour   Intake 1571.51 ml   Output --   Net 1571.51 ml       Constitutional:   NAD   Skin:   Warm and dry   Head:   Nontraumatic   Neck:   Supple, symmetrical, trachea midline, no adenopathy, thyroid symmetric, not enlarged and no tenderness, skin normal   Lungs:   scattered wheezes   Cardiovascular:   regular rate and rhythm    Abdomen:   Benign   Extremities and Back:   Symmetric, no curvature, spinous processes are non-tender on palpation, paraspinous muscles are non-tender on palpation, no costal vertebral tenderness   Neurological:   Grossly nonfocal            Medications:     No current outpatient medications on file.                Data:     Results for orders placed or performed during the hospital encounter of 25 (from the past 24 hours)   XR Chest Port 1 View    Narrative    EXAM: XR CHEST PORT 1 VIEW  LOCATION: [facility] " [facility]  DATE: 2/5/2025    INDICATION: Shortness of breath.  COMPARISON: 2/1/2025.      Impression    IMPRESSION: Increased mild hazy opacities that may be mildly increased edema. Atypical pneumonia remains a possibility particularly at the right upper lung. Stable opacity at the left lung base that may be ongoing airspace disease or atelectasis. Stable   cardiomegaly. Stable right neck central venous line tip at the right atrium. If relevant, this could be retracted by approximately 3.3 cm for placement at the cavoatrial level. Enteric feeding tube is again noted. Likely small basilar pleural fluid   appears stable.   Glucose by meter   Result Value Ref Range    GLUCOSE BY METER POCT 126 (H) 70 - 99 mg/dL   Glucose by meter   Result Value Ref Range    GLUCOSE BY METER POCT 126 (H) 70 - 99 mg/dL   Glucose by meter   Result Value Ref Range    GLUCOSE BY METER POCT 151 (H) 70 - 99 mg/dL   EKG 12-lead, tracing only   Result Value Ref Range    Systolic Blood Pressure  mmHg    Diastolic Blood Pressure  mmHg    Ventricular Rate 39 BPM    Atrial Rate 42 BPM    WV Interval 126 ms    QRS Duration 86 ms     ms    QTc 389 ms    P Axis 38 degrees    R AXIS 71 degrees    T Axis 56 degrees    Interpretation ECG       Sinus bradycardia with sinus arrhythmia  Abnormal ECG  When compared with ECG of 02-Feb-2025 12:16, (unconfirmed)  Sinus rhythm has replaced Atrial flutter  Vent. rate has decreased by  57 bpm  ST no longer depressed in Inferior leads  Nonspecific T wave abnormality no longer evident in Inferior leads  Nonspecific T wave abnormality no longer evident in Lateral leads  QT has lengthened     Blood gas arterial   Result Value Ref Range    pH Arterial 7.42 7.35 - 7.45    pCO2 Arterial 49 (H) 35 - 45 mm Hg    pO2 Arterial 78 (L) 80 - 105 mm Hg    FIO2 30     Bicarbonate Arterial 32 (H) 21 - 28 mmol/L    Base Excess/Deficit Arterial 6.7 (H) -3.0 - 3.0 mmol/L    Reza's Test Yes      Oxyhemoglobin Arterial 94 92 - 100 %    O2 Sat, Arterial 96.4 (H) 95.0 - 96.0 %    Narrative    In healthy individuals, oxyhemoglobin (O2Hb) and oxygen saturation (SO2) are approximately equal. In the presence of dyshemoglobins, oxyhemoglobin can be considerably lower than oxygen saturation.   CBC with platelets differential    Narrative    The following orders were created for panel order CBC with platelets differential.  Procedure                               Abnormality         Status                     ---------                               -----------         ------                     CBC with platelets and d...[[id number]]  Abnormal            Final result                 Please view results for these tests on the individual orders.   Basic metabolic panel   Result Value Ref Range    Sodium 132 (L) 135 - 145 mmol/L    Potassium 5.9 (H) 3.4 - 5.3 mmol/L    Chloride 92 (L) 98 - 107 mmol/L    Carbon Dioxide (CO2) 29 22 - 29 mmol/L    Anion Gap 11 7 - 15 mmol/L    Urea Nitrogen 63.0 (H) 8.0 - 23.0 mg/dL    Creatinine 3.38 (H) 0.51 - 0.95 mg/dL    GFR Estimate 13 (L) >60 mL/min/1.73m2    Calcium 9.8 8.8 - 10.4 mg/dL    Glucose 165 (H) 70 - 99 mg/dL   Ionized Calcium   Result Value Ref Range    Calcium Ionized Whole Blood 5.1 4.4 - 5.2 mg/dL   CBC with platelets and differential   Result Value Ref Range    WBC Count 6.5 4.0 - 11.0 10e3/uL    RBC Count 2.25 (L) 3.80 - 5.20 10e6/uL    Hemoglobin 7.2 (L) 11.7 - 15.7 g/dL    Hematocrit 23.5 (L) 35.0 - 47.0 %     (H) 78 - 100 fL    MCH 32.0 26.5 - 33.0 pg    MCHC 30.6 (L) 31.5 - 36.5 g/dL    RDW 17.2 (H) 10.0 - 15.0 %    Platelet Count 161 150 - 450 10e3/uL    % Neutrophils 76 %    % Lymphocytes 10 %    % Monocytes 10 %    % Eosinophils 3 %    % Basophils 0 %    % Immature Granulocytes 1 %    NRBCs per 100 WBC 0 <1 /100    Absolute Neutrophils 5.0 1.6 - 8.3 10e3/uL    Absolute Lymphocytes 0.6 (L) 0.8 - 5.3 10e3/uL    Absolute Monocytes 0.7 0.0 - 1.3 10e3/uL     Absolute Eosinophils 0.2 0.0 - 0.7 10e3/uL    Absolute Basophils 0.0 0.0 - 0.2 10e3/uL    Absolute Immature Granulocytes 0.1 <=0.4 10e3/uL    Absolute NRBCs 0.0 10e3/uL   TSH with free T4 reflex   Result Value Ref Range    TSH 5.67 (H) 0.30 - 4.20 uIU/mL   T4 free   Result Value Ref Range    Free T4 1.32 0.90 - 1.70 ng/dL   Glucose by meter   Result Value Ref Range    GLUCOSE BY METER POCT 201 (H) 70 - 99 mg/dL   Blood gas venous   Result Value Ref Range    pH Venous 7.27 (L) 7.32 - 7.43    pCO2 Venous 72 (H) 40 - 50 mm Hg    pO2 Venous 56 (H) 25 - 47 mm Hg    Bicarbonate Venous 33 (H) 21 - 28 mmol/L    Base Excess/Deficit Venous 5.1 (H) -3.0 - 3.0 mmol/L    FIO2 60     Oxyhemoglobin Venous 81 (H) 70 - 75 %    O2 Sat, Venous 82.6 (H) 70.0 - 75.0 %    Narrative    In healthy individuals, oxyhemoglobin (O2Hb) and oxygen saturation (SO2) are approximately equal. In the presence of dyshemoglobins, oxyhemoglobin can be considerably lower than oxygen saturation.   Comprehensive metabolic panel   Result Value Ref Range    Sodium 131 (L) 135 - 145 mmol/L    Potassium 6.1 (HH) 3.4 - 5.3 mmol/L    Carbon Dioxide (CO2) 29 22 - 29 mmol/L    Anion Gap 11 7 - 15 mmol/L    Urea Nitrogen 64.1 (H) 8.0 - 23.0 mg/dL    Creatinine 3.34 (H) 0.51 - 0.95 mg/dL    GFR Estimate 14 (L) >60 mL/min/1.73m2    Calcium 10.2 8.8 - 10.4 mg/dL    Chloride 91 (L) 98 - 107 mmol/L    Glucose 256 (H) 70 - 99 mg/dL    Alkaline Phosphatase 75 40 - 150 U/L    AST 17 0 - 45 U/L    ALT 24 0 - 50 U/L    Protein Total 6.4 6.4 - 8.3 g/dL    Albumin 3.5 3.5 - 5.2 g/dL    Bilirubin Total 0.3 <=1.2 mg/dL   CRP inflammation   Result Value Ref Range    CRP Inflammation 14.73 (H) <5.00 mg/L   Glucose by meter   Result Value Ref Range    GLUCOSE BY METER POCT 235 (H) 70 - 99 mg/dL   Glucose by meter   Result Value Ref Range    GLUCOSE BY METER POCT 357 (H) 70 - 99 mg/dL   Glucose by meter   Result Value Ref Range    GLUCOSE BY METER POCT 354 (H) 70 - 99 mg/dL    Glucose by meter   Result Value Ref Range    GLUCOSE BY METER POCT 330 (H) 70 - 99 mg/dL   Glucose by meter   Result Value Ref Range    GLUCOSE BY METER POCT 360 (H) 70 - 99 mg/dL   Potassium   Result Value Ref Range    Potassium 5.9 (H) 3.4 - 5.3 mmol/L   Blood gas venous   Result Value Ref Range    pH Venous 7.34 7.32 - 7.43    pCO2 Venous 57 (H) 40 - 50 mm Hg    pO2 Venous 54 (H) 25 - 47 mm Hg    Bicarbonate Venous 31 (H) 21 - 28 mmol/L    Base Excess/Deficit Venous 4.6 (H) -3.0 - 3.0 mmol/L    FIO2 30     Oxyhemoglobin Venous 84 (H) 70 - 75 %    O2 Sat, Venous 86.1 (H) 70.0 - 75.0 %    Narrative    In healthy individuals, oxyhemoglobin (O2Hb) and oxygen saturation (SO2) are approximately equal. In the presence of dyshemoglobins, oxyhemoglobin can be considerably lower than oxygen saturation.   Glucose by meter   Result Value Ref Range    GLUCOSE BY METER POCT 275 (H) 70 - 99 mg/dL   Glucose by meter   Result Value Ref Range    GLUCOSE BY METER POCT 295 (H) 70 - 99 mg/dL   Glucose by meter   Result Value Ref Range    GLUCOSE BY METER POCT 276 (H) 70 - 99 mg/dL   Potassium   Result Value Ref Range    Potassium 5.4 (H) 3.4 - 5.3 mmol/L   Magnesium   Result Value Ref Range    Magnesium 2.3 1.7 - 2.3 mg/dL   Basic metabolic panel   Result Value Ref Range    Sodium 133 (L) 135 - 145 mmol/L    Potassium 5.4 (H) 3.4 - 5.3 mmol/L    Chloride 94 (L) 98 - 107 mmol/L    Carbon Dioxide (CO2) 28 22 - 29 mmol/L    Anion Gap 11 7 - 15 mmol/L    Urea Nitrogen 76.6 (H) 8.0 - 23.0 mg/dL    Creatinine 3.88 (H) 0.51 - 0.95 mg/dL    GFR Estimate 11 (L) >60 mL/min/1.73m2    Calcium 9.8 8.8 - 10.4 mg/dL    Glucose 233 (H) 70 - 99 mg/dL   Glucose by meter   Result Value Ref Range    GLUCOSE BY METER POCT 228 (H) 70 - 99 mg/dL   Glucose by meter   Result Value Ref Range    GLUCOSE BY METER POCT 188 (H) 70 - 99 mg/dL   Glucose by meter   Result Value Ref Range    GLUCOSE BY METER POCT 167 (H) 70 - 99 mg/dL   Glucose by meter   Result  "Value Ref Range    GLUCOSE BY METER POCT 141 (H) 70 - 99 mg/dL   Glucose by meter   Result Value Ref Range    GLUCOSE BY METER POCT 126 (H) 70 - 99 mg/dL   Glucose by meter   Result Value Ref Range    GLUCOSE BY METER POCT 89 70 - 99 mg/dL     *Note: Due to a large number of results and/or encounters for the requested time period, some results have not been displayed. A complete set of results can be found in Results Review.       Clinically Significant Risk Factors        # Hyperkalemia: Highest K = 6.1 mmol/L in last 2 days, will monitor as appropriate  # Hyponatremia: Lowest Na = 131 mmol/L in last 2 days, will monitor as appropriate  # Hypochloremia: Lowest Cl = 91 mmol/L in last 2 days, will monitor as appropriate      # Hypoalbuminemia: Lowest albumin = 2.8 g/dL at 1/13/2025  5:14 AM, will monitor as appropriate     # Hypertension: Noted on problem list  # Acute heart failure with preserved ejection fraction: heart failure noted on problem list, last echo with EF >50%, and receiving IV diuretics    # Acute Hypercapnic Respiratory Failure: based on venous blood gas results.  Continue supplemental oxygen and ventilatory support as indicated.        # DMII: A1C = 6.5 % (Ref range: <5.7 %) within past 6 months   # Obesity: Estimated body mass index is 33.86 kg/m  as calculated from the following:    Height as of this encounter: 1.702 m (5' 7.01\").    Weight as of this encounter: 98.1 kg (216 lb 4.3 oz).      # Financial/Environmental Concerns: none        Cardiac Arrhythmia: Bradycardia, unspecified        Fluid overload, unspecified            CKD POA List: ESRD on dialysis        COPD  Pneumonia        Chronic Fatigue and Other Debilities: Bed confinement status             "

## 2025-02-06 NOTE — PLAN OF CARE
Problem: Fall Injury Risk  Goal: Absence of Fall and Fall-Related Injury  Outcome: Progressing  Intervention: Identify and Manage Contributors  Recent Flowsheet Documentation  Taken 2/6/2025 0400 by Rachel Leal RN  Medication Review/Management:   medications reviewed   high-risk medications identified  Taken 2/6/2025 0000 by Rachel Leal RN  Medication Review/Management:   medications reviewed   high-risk medications identified  Taken 2/5/2025 2000 by Rachel Leal RN  Medication Review/Management:   medications reviewed   high-risk medications identified  Intervention: Promote Injury-Free Environment  Recent Flowsheet Documentation  Taken 2/6/2025 0400 by Rachel Leal RN  Safety Promotion/Fall Prevention:   activity supervised   assistive device/personal items within reach   clutter free environment maintained   lighting adjusted   nonskid shoes/slippers when out of bed   room near nurse's station   room organization consistent  Taken 2/6/2025 0000 by Rachel Leal RN  Safety Promotion/Fall Prevention:   activity supervised   assistive device/personal items within reach   clutter free environment maintained   lighting adjusted   nonskid shoes/slippers when out of bed   room near nurse's station   room organization consistent  Taken 2/5/2025 2000 by Rachel Leal RN  Safety Promotion/Fall Prevention:   activity supervised   assistive device/personal items within reach   clutter free environment maintained   lighting adjusted   nonskid shoes/slippers when out of bed   room near nurse's station   room organization consistent   Goal Outcome Evaluation:       Summary: Influenza    [id number]  Neuro: A/O x2, forgetful. Moves all extremities, follows commands.     CV/Tele: NSR. MAP > 65.     Resp: On cont BiPAP at 30%. LS dem.     GI/: Oliguric on dialysis. TF cycle 9410-1514. NG in place. Pureed diet, poor appetite.     Skin: Mepilex to coccyx. L AKA. Miconazole powder applied to folds.      IV/infusion: R internal jugular, L fistula. Insulin on alg 3, TKO.     Followed By: ICU, speech, cardiology consult.

## 2025-02-06 NOTE — PROGRESS NOTES
Hospitalist following peripherally. Appreciate Intensivist management of patient. Will resume care when patient transferred.     Vivian Samaniego MD  Hospitalist

## 2025-02-06 NOTE — PROGRESS NOTES
Renal Medicine Progress Note            Assessment/Plan:     75 y.o woman with ESRD, admitted for respiratory distress due to influenza A infection.      # ESRD:               -TTS               -NICOLEF               -Dr. Basilio               -[facility] [place]                 # Influenza A:               -finished Tamiflu     # FEN: hypekalemia.  -Recurrent hyperkalemia despite regular dialysis.  Likely partly driven by respiratory acidosis and cellular shift     # Anemia: Hgb is below target                -epo with HD    # Volume overload , acute hypoxic/hypercarbic respiratory failure-chest x-ray with pulm edema     Plan:  # Hemodialysis today.  3-4 L ultrafiltration as tolerated.  2K bath.  3.5-hour  Repeat dialysis tomorrow and Saturday with aggressive UF  # renal diet-discussed with nutritionist to see if she can be switched to tube feeds with lower potassium content  # Noted patient moved to Monday Wednesday Friday schedule at her outpatient [facility].  Switch to Monday Wednesday Friday next week.    Critically ill patient with high risk of decompensation.  Daily dialysis planned as listed above.          Interval History:     Seen/examined.  Overnight events noted.  RRT and back to ICU with significant hypotension associated with bradycardia requiring dopamine infusion, likely related to rate control  meds.  Further issues with worsening respiratory status  On eval today, she is slightly confused.  Mild dyspnea  Chest x-ray on 2/5 shows volume overload  Off dopamine drip           Medications and Allergies:     Current Facility-Administered Medications   Medication Dose Route Frequency Provider Last Rate Last Admin    - MEDICATION INSTRUCTIONS for Dialysis Patients -   Does not apply See Admin Instructions Braden Montana MD        amiodarone (PACERONE) tablet 200 mg  200 mg Oral Daily Cheo Farris MD   200 mg at 02/06/25 1031    apixaban ANTICOAGULANT (ELIQUIS) tablet 5 mg  5 mg Oral or  Feeding Tube BID Denver Shipman MD   5 mg at 02/06/25 0828    atorvastatin (LIPITOR) tablet 20 mg  20 mg Oral or Feeding Tube QPM Denver Shipman MD   20 mg at 02/05/25 2013    B and C vitamin Complex with folic acid (NEPHRONEX) liquid 5 mL  5 mL Per Feeding Tube Daily Denver Shipman MD   5 mL at 02/06/25 0829    budesonide (PULMICORT) neb solution 1 mg  1 mg Nebulization BID Denver Shipman MD   1 mg at 02/06/25 0716    cetirizine (zyrTEC) tablet 5 mg  5 mg Oral or Feeding Tube At Bedtime Denver Shipman MD   5 mg at 02/05/25 2125    [Held by provider] diltiazem (CARDIZEM) tablet 60 mg  60 mg Per G Tube Q6H [facility] Liset Romero MD   60 mg at 02/05/25 1526    epoetin candy-epbx (RETACRIT) injection 10,000 Units  10,000 Units Intravenous Once in dialysis/CRRT Davide Madrid MD        furosemide (LASIX) tablet 80 mg  80 mg Oral Daily Davide Madrid MD   80 mg at 02/06/25 0843    heparin lock flush 10 unit/mL injection 5-20 mL  5-20 mL Intracatheter Q24H Liset Romero MD   5 mL at 02/05/25 1042    [Held by provider] insulin aspart (NovoLOG) injection (RAPID ACTING)  1-12 Units Subcutaneous Q4H Denver Shipman MD   7 Units at 02/05/25 2351    insulin glargine (LANTUS PEN) injection 15 Units  15 Units Subcutaneous BID Denver Shipman MD   15 Units at 02/05/25 2126    ipratropium - albuterol 0.5 mg/2.5 mg/3 mL (DUONEB) neb solution 3 mL  3 mL Nebulization 4x daily Denver Shipman MD   3 mL at 02/06/25 0716    miconazole (MICATIN) 2 % powder   Topical BID Denver Shipman MD   Given at 02/06/25 0844    No heparin via hemodialysis machine   Does not apply Once Davide Madrid MD        No heparin via hemodialysis machine   Does not apply Once Davide Madrid MD        No heparin via hemodialysis machine   Does not apply Once Davide Madrid MD        pantoprazole (PROTONIX) 2 mg/mL suspension 40 mg  40 [facility] Denver Shipman MD   40 mg at 02/06/25 0829    Or    pantoprazole  (PROTONIX) IV push injection 40 mg  40 mg Intravenous QAM AC Denver Shipman MD   40 mg at 02/05/25 0630    polyethylene glycol (MIRALAX) Packet 17 g  17 g Oral or Feeding Tube Daily Liset Romero MD   17 g at 02/06/25 0828    Prosource TF20 ENfit Compatibl EN LIQD (PROSOURCE TF20) packet 60 mL  1 packet Per Feeding Tube BID Vivian Russell MD   60 mL at 02/06/25 0831    sennosides (SENOKOT) syrup 10 mL  10 mL Oral or Feeding Tube BID Liset Romero MD   10 mL at 02/06/25 0828    sertraline (ZOLOFT) tablet 75 mg  75 mg Oral or Feeding Tube QPM Denver Shipman MD   75 mg at 02/05/25 2013    [Held by provider] sevelamer carbonate (RENVELA) tablet 800 mg  800 mg Oral TID w/meals Denver Shipman MD   800 mg at 02/06/25 0828    sodium chloride 0.9% BOLUS 200 mL  200 mL Hemodialysis Machine Once Davide Madrid MD        sodium chloride 0.9% BOLUS 200 mL  200 mL Hemodialysis Machine Once Davide Madrid MD        sodium chloride 0.9% BOLUS 200 mL  200 mL Hemodialysis Machine Once Davide Madrid MD        sodium chloride 0.9% BOLUS 250 mL  250 mL Intravenous Once in dialysis/CRRT Davide Madrid MD        sodium chloride 0.9% BOLUS 250 mL  250 mL Intravenous Once in dialysis/CRRT Davide Madrid MD        sodium chloride 0.9% BOLUS 250 mL  250 mL Intravenous Once in dialysis/CRRT Davide Madrid MD        sodium chloride 0.9% BOLUS 500 mL  500 mL Hemodialysis Machine Once Davide Madrid MD        sodium chloride 0.9% BOLUS 500 mL  500 mL Hemodialysis Machine Once Davide Madrid MD        sodium zirconium cyclosilicate (LOKELMA) packet 10 g  10 g Oral TID Celestina Ross NP   10 g at 02/06/25 1031    Followed by    [START ON 2/8/2025] sodium zirconium cyclosilicate (LOKELMA) packet 10 g  10 g Oral Daily Celestina Ross NP        tacrolimus (PROTOPIC) 0.1 % ointment   Topical BID Denver Shipman MD   Given at 02/06/25 0844    vitamin D3 (CHOLECALCIFEROL) tablet 50 mcg  50 mcg Oral or Feeding Tube  "Daily Denver Shipman MD   50 mcg at 02/06/25 0828        Allergies   Allergen Reactions    Ampicillin-Sulbactam Sodium Rash     No evidence SJS, but very uncomfortable and precipitated multiple provider visits. Would not use penicillins again if other options available.     Penicillins Rash            Physical Exam:   Vitals were reviewed   , Blood pressure 124/61, pulse 70, temperature 98.4  F (36.9  C), temperature source Oral, resp. rate (!) 31, height 1.702 m (5' 7.01\"), weight 98.1 kg (216 lb 4.3 oz), SpO2 100%, not currently breastfeeding.    Wt Readings from Last 3 Encounters:   02/06/25 98.1 kg (216 lb 4.3 oz)   06/14/24 101.1 kg (222 lb 14.2 oz)   06/03/24 101.1 kg (222 lb 14.2 oz)       GENERAL APPEARANCE: Restless  RESP: Diminished at bases.  Few crackles  CV: irregularly, irregular.  ABDOMEN: obese, soft. NT  EXTREMITIES/SKIN: + Edema         Data:     CBC RESULTS:     Recent Labs   Lab 02/06/25  1020 02/05/25  1736 02/02/25  0542 02/01/25  0553 01/31/25  0607   WBC 9.2 6.5 5.6 6.4 5.3   RBC 2.40* 2.25* 2.41* 2.45* 2.47*   HGB 7.7* 7.2* 7.6* 7.8* 8.1*   HCT 25.0* 23.5* 25.3* 25.4* 25.6*    161 170 175 174       Basic Metabolic Panel:  Recent Labs   Lab 02/06/25  0958 02/06/25  0836 02/06/25  0831 02/06/25  0736 02/06/25  0631 02/06/25  0532 02/06/25  0429 02/06/25  0416 02/06/25  0115 02/06/25  0109 02/05/25  2006 02/05/25  2004 02/05/25  1836 02/05/25  1736 02/05/25  1249 02/05/25  0631 02/04/25  0808 02/04/25  0624   NA  --  134*  --   --   --   --   --  133*  --   --   --  131*  --  132*  --  134*  --  135   POTASSIUM  --  5.7*  --   --   --   --   --  5.4*  5.4*  --  5.9*  --  6.1*  --  5.9*  --  4.9  --  6.4*   CHLORIDE  --  94*  --   --   --   --   --  94*  --   --   --  91*  --  92*  --  95*  --  98   CO2  --  29  --   --   --   --   --  28  --   --   --  29  --  29  --  30*  --  27   BUN  --  77.9*  --   --   --   --   --  76.6*  --   --   --  64.1*  --  63.0*  --  49.2*  --  77.8* "   CR  --  3.95*  --   --   --   --   --  3.88*  --   --   --  3.34*  --  3.38*  --  2.91*  --  4.44*   * 77 89 126* 141* 167*   < > 233*   < >  --    < > 256*   < > 165*   < > 174*   < > 178*   JODY  --  9.9  --   --   --   --   --  9.8  --   --   --  10.2  --  9.8  --  9.5  --  9.7    < > = values in this interval not displayed.       INRNo lab results found in last 7 days.   Attestation:   I have reviewed today's relevant vital signs, notes, medications, labs and imaging.    Davide Madrid MD  [facility] - Nephrology   [phone]

## 2025-02-06 NOTE — PROGRESS NOTES
"Pt arrived to ICU at approximately 1825. Given the pt arrival at change of shift I was not able to complete a full assessment. My care mostly involve \"settling\" the pt into the room, connecting to the monitor, and verifying gtt's and lines. Pt arrived on BiPAP, but became noxious and was switched to oxymask for safety. PRN IV Zofran given. Pt did not vomit and nausea symptoms \"slightly better\" at change of shift.  "

## 2025-02-06 NOTE — PROGRESS NOTES
Critical Care  Note      02/06/2025    Name: Supriya Herr MRN#: [id number]   Age: 75 year old YOB: 1950     [facility]  [facility] #1    MV DAY #0             Problem List:   Principal Problem:    Acute and chronic respiratory failure with hypoxia (H)  Active Problems:    Type 2 diabetes mellitus with diabetic chronic kidney disease (H)    ESRD on dialysis (H)    ESRD (end stage renal disease) on dialysis (H)    Influenza A    Elevated troponin    Abnormal chest x-ray    Anemia, unspecified type    Acute metabolic encephalopathy    Severe sepsis with acute organ dysfunction (H)    Pleural effusion           Summary/Hospital Course:     Patient was admitted, subsequently intubated for worsening respiratory failure and remained on mechanical ventilator 1/9 - 1/18. She was reintubated and remained on mechanical ventilator 1/20 - 1/25. Was treated with Tamiflu, IV antibiotic, steroid, has completed courses. Hospital course complicated by A-fib RVR. Was on amiodarone drip with eventual transition to p.o. Also required diltiazem for rate control. Required BiPAP--HFNC--intermittent BiPAP use. Ongoing tube feeding and modified diet per SLP. Delirium managed with Seroquel. Concern for recurrent pneumonia, hospital-acquired on 2/1, restarted on antibiotics. ID consulted, recommended discontinuing antibiotics, respiratory failure likely related to fluid overload.      Required transfer back to ICU on 2/5/25 for hypotension associated with bradycardia requiring dopamine infusion suspected 2/2 rate control medications. Cardiology consulted. Additionally, continues to struggle with hypoxic and hypercarbic respiratory failure with BiPAP dependence 2/2 volume overload.      Interval/overnight events: No acute overnight events        Assessment and plan :     Supriya Herr is a 75 year old female PMH significant for ESRD on HD, HFpEF, COPD, DM type II, hypertension, and poor mobility (left AKA, Obesity, WC  bound). Initially admitted 1/8/2025 for treatment of influenza A with COPD exacerbation complicated by Afib with RVR, volume overload, and delirium. Now presents in transfer back to ICU 2/5/25 for hypotension associated with bradycardia suspected 2/2 cardiac rate control medications.     I have personally reviewed the daily labs, imaging studies, cultures and discussed the case with referring physician and consulting physicians.     My assessment and plan by system for this patient is as follows:    Neurology/Psychiatry:   # Delirium  # Anxiety  - Limit benzodiazepines  - Seroquel 25 mg am/ 50 mg PM  - Zoloft 75 mg qHS      Cardiovascular:   # Symptomatic bradycardia, suspect medication induced   # Afib with RVR   # Acute on chronic HFpEF (LVEF 60% on 1/21/25)  Had difficult to control Afib with RVR. Was on Amiodarone 200 mg every day, carvedilol 25 mg every day, and diliazem 60 mg q6h. On 2/5/25 developed bradycardia associated with hypotension. Improved with initiation of dopamine which was discontinued on 2/5 at 23:45.  - Tele and VS per ICU routine  - Holding carvedilol, and diltiazem  - Cardiology consulted, appreciate recs   - continue amiodarone only, decrease to 200 mg daily  - continue atorvastatin 20 mg  - continue apixaban 5 mg BID    Pulmonary/Ventilator Management:   # Acute hypoxic and hypercarbic respiratory failure 2/2 volume overload    # COPD exacerbation  # HTN  Becomes very dyspneic, SOB, and desaturates when BiPAP briefly removed.  - CXR 2/5 continues to show volume overload & weight is uptrending   - volume removal with iHD, consider daily dialysis if pt becomes bipap dependent  - trend VBG  - continue duonebs QID & budesonide BID  - continue cetirizine 5 mg at bedtime  - Continue BIPAP at night, settings are 15/6 30%    Had an episode of shortness of breath this morning, sats in the low 90s with blank stare and diaphoresis. BP was ok, crackles on auscultation. She was repositioned in bed with  raising the HOB and VBG, CXR, EKG, troponin, and lactate were ordered. Will place her back on BIPAP.       GI and Nutrition :   # Constipation  - noted on CT 2/2   - Scheduled sennosides 10 ml BID, miralax once daily   - PRN senna tablets      # Dysphagia   Suspect due to recurrent intubation.  - Dietitian consulted.  - Nasoduodenal tube in place. Continue tube feedings for now; 4 pm to 6 am at 55 ml/her.  - Did well with video swallow study on 1/31/25, diet is being advanced --> Pureed with thins.  - Cycling tube feeds    Renal/Fluids/Electrolytes:   #ESRD on HD (T, Th, Sat - makes minimal urine)  # Hyperkalemia 2/2 above   # Hypervolemic hyponatremia   # Volume overload   - K+ 6.1 on ICU arrival --> Shifted with insulin & dextrose, start lokelma protocol   - Trend K+ q4h overnight  - iHD planned today  - Continue sevelamer, vit D3  - Nephrology resumed Lasix 50 mg po on nondialysis days on 2/3  - daily weights     Infectious Disease:   # Influenza A, s/p treatment  # HCAP, s/p treatment   - No fevers or leukocytosis  - Send UA per daughter's request if able to collect urine  - CXR more consistent with fluid overload, will discontinue azithromycin and monitor off abx    Endocrine:   # DM2  HbA1c 6.5% on 1/17/25. On tube feeds from 4 pm to 6 am at 55 ml/hr.  - discontinue insulin infusion   - Sliding scale insulin (high) q4h  - Insulin glargine 15 units BID     Hematology/Oncology:   # Anemia of chronic disease  Hemoglobin 7-8, at baseline.   - continue to monitor     IV/Access:   - AV fistula  - CVC R internal jugular  - Small bore feeding tube      ICU Prophylaxis:   1. DVT: DOAC  2. Stress Ulcer: PPI is a home medication   3. Family Update:pending   4. Disposition - Monitor in ICU and decide about transfer to IMC later this afternoon    Clinically Significant Risk Factors        # Hyperkalemia: Highest K = 6.1 mmol/L in last 2 days, will monitor as appropriate  # Hyponatremia: Lowest Na = 131 mmol/L in last 2  "days, will monitor as appropriate  # Hypochloremia: Lowest Cl = 91 mmol/L in last 2 days, will monitor as appropriate      # Hypoalbuminemia: Lowest albumin = 2.8 g/dL at 1/13/2025  5:14 AM, will monitor as appropriate     # Hypertension: Noted on problem list  # Acute heart failure with preserved ejection fraction: heart failure noted on problem list, last echo with EF >50%, and receiving IV diuretics    # Acute Hypercapnic Respiratory Failure: based on venous blood gas results.  Continue supplemental oxygen and ventilatory support as indicated.        # DMII: A1C = 6.5 % (Ref range: <5.7 %) within past 6 months   # Obesity: Estimated body mass index is 33.86 kg/m  as calculated from the following:    Height as of this encounter: 1.702 m (5' 7.01\").    Weight as of this encounter: 98.1 kg (216 lb 4.3 oz).      # Financial/Environmental Concerns: none                             Key Medications:     Current Facility-Administered Medications   Medication Dose Route Frequency Provider Last Rate Last Admin    - MEDICATION INSTRUCTIONS for Dialysis Patients -   Does not apply See Admin Instructions Braden Montana MD        apixaban ANTICOAGULANT (ELIQUIS) tablet 5 mg  5 mg Oral or Feeding Tube BID Denver Shipman MD   5 mg at 02/05/25 2013    atorvastatin (LIPITOR) tablet 20 mg  20 mg Oral or Feeding Tube QPM Denver Shipman MD   20 mg at 02/05/25 2013    azithromycin (ZITHROMAX) 500 mg vial to attach to  mL bag  500 mg Intravenous Q24H Enu-Vivian Samaniego MD   500 mg at 02/05/25 1531    B and C vitamin Complex with folic acid (NEPHRONEX) liquid 5 mL  5 mL Per Feeding Tube Daily Denver Shipman MD   5 mL at 02/05/25 1038    budesonide (PULMICORT) neb solution 1 mg  1 mg Nebulization BID Denver Shipman MD   1 mg at 02/06/25 0716    [Held by provider] carvedilol (COREG) tablet 25 mg  25 mg Oral or Feeding Tube BID w/meals Liest Romero MD   25 mg at 02/05/25 1036    cetirizine (zyrTEC) " tablet 5 mg  5 mg Oral or Feeding Tube At Bedtime Denver Shipman MD   5 mg at 02/05/25 2125    [Held by provider] diltiazem (CARDIZEM) tablet 60 mg  60 mg Per G Tube Q6H [facility] Liset Romero MD   60 mg at 02/05/25 1526    furosemide (LASIX) tablet 80 mg  80 mg Oral Daily Davide Madrid MD   80 mg at 02/05/25 1036    heparin lock flush 10 unit/mL injection 5-20 mL  5-20 mL Intracatheter Q24H Liset Romero MD   5 mL at 02/05/25 1042    [Held by provider] insulin aspart (NovoLOG) injection (RAPID ACTING)  1-12 Units Subcutaneous Q4H Denver Shipman MD   7 Units at 02/05/25 2351    insulin glargine (LANTUS PEN) injection 15 Units  15 Units Subcutaneous BID Denver Shipman MD   15 Units at 02/05/25 2126    ipratropium - albuterol 0.5 mg/2.5 mg/3 mL (DUONEB) neb solution 3 mL  3 mL Nebulization 4x daily Denver Shipman MD   3 mL at 02/06/25 0716    miconazole (MICATIN) 2 % powder   Topical BID Denver Shipman MD   Given at 02/05/25 2130    No heparin via hemodialysis machine   Does not apply Once Davide Madrid MD        No heparin via hemodialysis machine   Does not apply Once Davide Madird MD        pantoprazole (PROTONIX) 2 mg/mL suspension 40 mg  40 mg Per Feeding Tube QAM AC Denver Shipman MD   40 mg at 02/04/25 0914    Or    pantoprazole (PROTONIX) IV push injection 40 mg  40 mg Intravenous QAM Denver Mays MD   40 mg at 02/05/25 0630    polyethylene glycol (MIRALAX) Packet 17 g  17 g Oral or Feeding Tube Daily Liset Romero MD   17 g at 02/05/25 1036    Prosource TF20 ENfit Compatibl EN LIQD (PROSOURCE TF20) packet 60 mL  1 packet Per Feeding Tube BID Enu-Vivian Samaniego MD   60 mL at 02/06/25 0232    QUEtiapine (SEROquel) tablet 25 mg  25 mg Oral or Feeding Tube [facility] Denver Shipman MD   25 mg at 02/04/25 0917    QUEtiapine (SEROquel) tablet 50 mg  50 mg Oral or Feeding Tube At Bedtime Denver Shipman MD   50 mg at 02/05/25 2146    sennosides (SENOKOT) syrup 10 mL  10  mL Oral or Feeding Tube BID Liset Romero MD   10 mL at 02/05/25 2012    sertraline (ZOLOFT) tablet 75 mg  75 mg Oral or Feeding Tube QPM Denver Shipman MD   75 mg at 02/05/25 2013    sevelamer carbonate (RENVELA) tablet 800 mg  800 mg Oral TID w/meals Denver Shipman MD   800 mg at 02/04/25 1746    sodium chloride 0.9% BOLUS 200 mL  200 mL Hemodialysis Machine Once Davide Madrid MD        sodium chloride 0.9% BOLUS 200 mL  200 mL Hemodialysis Machine Once Davide Madrid MD        sodium chloride 0.9% BOLUS 250 mL  250 mL Intravenous Once in dialysis/CRRT Davide Madrid MD        sodium chloride 0.9% BOLUS 250 mL  250 mL Intravenous Once in dialysis/CRRT Davide Madrid MD        sodium chloride 0.9% BOLUS 500 mL  500 mL Hemodialysis Machine Once Davide Madrid MD        sodium zirconium cyclosilicate (LOKELMA) packet 10 g  10 g Oral TID Celestina Ross NP   10 g at 02/05/25 2353    Followed by    [START ON 2/8/2025] sodium zirconium cyclosilicate (LOKELMA) packet 10 g  10 g Oral Daily Celestina Ross NP        tacrolimus (PROTOPIC) 0.1 % ointment   Topical BID Denver Shipman MD   Given at 02/05/25 2130    vitamin D3 (CHOLECALCIFEROL) tablet 50 mcg  50 mcg Oral or Feeding Tube Daily Denver Shipman MD   50 mcg at 02/05/25 1036     Current Facility-Administered Medications   Medication Dose Route Frequency Provider Last Rate Last Admin    dextrose 10% infusion   Intravenous Continuous PRN Celestina Ross NP        dextrose 10% infusion   Intravenous Continuous PRN Denver Shipman MD        DOPamine 400 mg in dextrose 5% 250 mL (adult std) - premix - CENTRAL  2-20 mcg/kg/min Intravenous Continuous Gracie Ni NP   Stopped at 02/05/25 2345    insulin regular (MYXREDLIN) 1 unit/mL infusion  0-24 Units/hr Intravenous Continuous Celestina Ross NP 3 mL/hr at 02/06/25 0653 3 Units/hr at 02/06/25 0602    No lozenges or gum should be given while patient on BIPAP/AVAPS/AVAPS AE   Does  not apply Continuous PRN Denver Shipman MD        Patient may continue current oral medications   Does not apply Continuous PRN Denver Shipman MD        sodium chloride 0.9 % infusion   Intravenous Continuous Denver Shipman MD 10 mL/hr at 02/05/25 1831 10 mL/hr at 02/05/25 1831              Physical Examination:   Temp:  [97.4  F (36.3  C)-98  F (36.7  C)] 97.4  F (36.3  C)  Pulse:  [46-76] 66  Resp:  [22-40] 31  BP: ()/(41-91) 127/41  FiO2 (%):  [30 %] 30 %  SpO2:  [88 %-100 %] 100 %    Intake/Output Summary (Last 24 hours) at 2/6/2025 0736  Last data filed at 2/6/2025 0400  Gross per 24 hour   Intake 1461.51 ml   Output --   Net 1461.51 ml     Wt Readings from Last 4 Encounters:   02/06/25 98.1 kg (216 lb 4.3 oz)   06/14/24 101.1 kg (222 lb 14.2 oz)   06/03/24 101.1 kg (222 lb 14.2 oz)   04/24/24 101.1 kg (222 lb 14.2 oz)     BP - Mean:  [] 66  FiO2 (%): 30 %, Resp: (!) 31  Recent Labs   Lab 02/06/25  0109 02/05/25  2004 02/05/25  1733 02/01/25  0909   PH  --   --  7.42  --    PCO2  --   --  49*  --    PO2  --   --  78*  --    HCO3  --   --  32*  --    O2PER 30 60 30 30       GEN: no acute distress, confused  HEENT: head ncat, sclera anicteric, , trachea midline. NG tube in place.   PULM: Oxymask in place. Anterior lung files with some expiratory crackles in right upper lung, clear left upper lung.  CV/COR: RRR S1S2 no gallop,  No rub, no murmur. No BLE edema  ABD: soft nontender,  no mass  EXT:  warm and well perfused x4.   NEURO: PERRL, no obvious deficits  SKIN: no obvious rash  LINES: Central line present.         Data:   All data and imaging reviewed     ROUTINE ICU LABS (Last four results)  CMP  Recent Labs   Lab 02/06/25  0631 02/06/25  0532 02/06/25  0449 02/06/25  0429 02/06/25  0416 02/06/25  0115 02/06/25  0109 02/05/25  2006 02/05/25  2004 02/05/25  1836 02/05/25  1736 02/05/25  1249 02/05/25  0631 02/04/25  0808 02/04/25  0624 02/03/25  0950 02/03/25  0453 02/02/25  0814  02/02/25  0542   NA  --   --   --   --  133*  --   --   --  131*  --  132*  --  134*  --  135  --  136  --  138   POTASSIUM  --   --   --   --  5.4*  5.4*  --  5.9*  --  6.1*  --  5.9*  --  4.9  --  6.4*  --  5.7*  --  4.9   CHLORIDE  --   --   --   --  94*  --   --   --  91*  --  92*  --  95*  --  98  --  99  --  99   CO2  --   --   --   --  28  --   --   --  29  --  29  --  30*  --  27  --  29  --  30*   ANIONGAP  --   --   --   --  11  --   --   --  11  --  11  --  9  --  10  --  8  --  9   * 167* 188* 228* 233*   < >  --    < > 256*   < > 165*   < > 174*   < > 178*   < > 158*   < > 211*   BUN  --   --   --   --  76.6*  --   --   --  64.1*  --  63.0*  --  49.2*  --  77.8*  --  58.9*  --  36.2*   CR  --   --   --   --  3.88*  --   --   --  3.34*  --  3.38*  --  2.91*  --  4.44*  --  3.49*  --  2.38*   GFRESTIMATED  --   --   --   --  11*  --   --   --  14*  --  13*  --  16*  --  10*  --  13*  --  21*   JODY  --   --   --   --  9.8  --   --   --  10.2  --  9.8  --  9.5  --  9.7  --  9.8  --  9.5   MAG  --   --   --   --  2.3  --   --   --   --   --   --   --  2.2  --  2.3  --  2.2  --  2.1   PHOS  --   --   --   --   --   --   --   --   --   --   --   --   --   --   --   --  2.6  --   --    PROTTOTAL  --   --   --   --   --   --   --   --  6.4  --   --   --   --   --   --   --   --   --  5.6*   ALBUMIN  --   --   --   --   --   --   --   --  3.5  --   --   --   --   --   --   --   --   --  3.2*   BILITOTAL  --   --   --   --   --   --   --   --  0.3  --   --   --   --   --   --   --   --   --  0.2   ALKPHOS  --   --   --   --   --   --   --   --  75  --   --   --   --   --   --   --   --   --  55   AST  --   --   --   --   --   --   --   --  17  --   --   --   --   --   --   --   --   --  12   ALT  --   --   --   --   --   --   --   --  24  --   --   --   --   --   --   --   --   --  22    < > = values in this interval not displayed.     CBC  Recent Labs   Lab 02/05/25  1736 02/02/25  0542 02/01/25  0553  "01/31/25  0607   WBC 6.5 5.6 6.4 5.3   RBC 2.25* 2.41* 2.45* 2.47*   HGB 7.2* 7.6* 7.8* 8.1*   HCT 23.5* 25.3* 25.4* 25.6*   * 105* 104* 104*   MCH 32.0 31.5 31.8 32.8   MCHC 30.6* 30.0* 30.7* 31.6   RDW 17.2* 17.8* 18.1* 18.6*    170 175 174     INRNo lab results found in last 7 days.  Arterial Blood Gas  Recent Labs   Lab 02/06/25  0109 02/05/25  2004 02/05/25  1733 02/01/25  0909   PH  --   --  7.42  --    PCO2  --   --  49*  --    PO2  --   --  78*  --    HCO3  --   --  32*  --    O2PER 30 60 30 30       All cultures:  No results for input(s): \"CULT\" in the last 168 hours.  Recent Results (from the past 24 hours)   XR Chest Port 1 View    Narrative    EXAM: XR CHEST PORT 1 VIEW  LOCATION: [facility]  DATE: 2/5/2025    INDICATION: Shortness of breath.  COMPARISON: 2/1/2025.      Impression    IMPRESSION: Increased mild hazy opacities that may be mildly increased edema. Atypical pneumonia remains a possibility particularly at the right upper lung. Stable opacity at the left lung base that may be ongoing airspace disease or atelectasis. Stable   cardiomegaly. Stable right neck central venous line tip at the right atrium. If relevant, this could be retracted by approximately 3.3 cm for placement at the cavoatrial level. Enteric feeding tube is again noted. Likely small basilar pleural fluid   appears stable.                                Gracie Gracia MS4  [employer]    Time Spent on this Encounter   Supriya was seen and evaluated by me on 02/06/25.  She was in critical condition as the result of cardiogenic shock.    Her condition is now Fair.      I was present with the student who participated in the service and in the documentation of the note. I   have verified the history and personally performed the physical exam and medical decision-making. I   agree with the assessment and plan of care as documented in the note.     The acute issues " managed by me today include cardiogenic shock, acute on chronic hypoxic respiratory failure, hyperkalemia, anxiety, and ESRD.   Supportive interventions provided and/or ordered by me include BIPAP, dopamine infusion (now off), iHD, and hemodynamic monitoring    Total Critical Care time spent by me, excluding procedures, was 55 minutes.    Cheo Watt MD

## 2025-02-06 NOTE — PROGRESS NOTES
Pt with increasing respiratory distress despite BIPAP and states she is just feeling bad. BP lower than her baseline, HR now dropping as low as 35, sustaining in the 40s, appears junctional. RRT called.

## 2025-02-06 NOTE — CONSULTS
SPIRITUAL HEALTH SERVICES - Consult Note  [facility]   ICU  Referral Source: Consult for spiritual/emotional support.    Per chart notes, Supriya is spiritual and welcomes companionship. When I visited, she was on HD and BiPap, finding it difficult to talk. Dialysis staff shared she is scheduled for HD for three days in a row as they manage symptoms.    Supriya welcomed me but preferred a visit on another day.    I affirmed experiences, validated resilience, and said a blessing.    Plan: Spiritual Care will continue to follow.    Rev Magalie Lugo MA  Associate     [facility] routine referrals ([facility] *[phone]) ([facility] *[phone])  [facility] available 24/7 for emergent requests/referrals, either by paging the on-call  or by entering an ASAP/STAT consult in Epic (this will also page the on-call ).

## 2025-02-06 NOTE — PROGRESS NOTES
Potassium   Date Value Ref Range Status   02/06/2025 5.7 (H) 3.4 - 5.3 mmol/L Final   02/02/2022 4.7 3.4 - 5.3 mmol/L Final   04/17/2021 4.2 3.4 - 5.3 mmol/L Final     Hemoglobin   Date Value Ref Range Status   02/06/2025 7.7 (L) 11.7 - 15.7 g/dL Final   04/16/2021 10.9 (L) 11.7 - 15.7 g/dL Final     Creatinine   Date Value Ref Range Status   02/06/2025 3.95 (H) 0.51 - 0.95 mg/dL Final   04/17/2021 5.98 (H) 0.52 - 1.04 mg/dL Final     Urea Nitrogen   Date Value Ref Range Status   02/06/2025 77.9 (H) 8.0 - 23.0 mg/dL Final   11/24/2021 37 (H) 7 - 30 mg/dL Final   04/17/2021 68 (H) 7 - 30 mg/dL Final     Sodium   Date Value Ref Range Status   02/06/2025 134 (L) 135 - 145 mmol/L Final   04/17/2021 137 133 - 144 mmol/L Final     INR   Date Value Ref Range Status   04/24/2024 1.11 0.85 - 1.15 Final   04/16/2021 1.14 0.86 - 1.14 Final       DIALYSIS PROCEDURE NOTE  Hepatitis status of previous patient on machine log was checked and verified ok to use with this patients hepatitis status.    Patient dialyzed for 3.15 hrs. on a K2 bath with a net fluid removal of  3.2L.  A BFR of 400 ml/min was obtained via a left avf using 15 gauge needles.      The treatment plan was discussed with Dr. Madrid during the treatment.    Total heparin received during the treatment: 0 units.   Needle cannulation sites held x 10 min.       Meds  given: Epogen 10,000   Complications: Soft bp's, Levo started with 1.5 hours left of treatment      Person educated: patient. Knowledge base substantial. Barriers to learning: some confusion . Educated on procedure via verbal mode. The patient/family verbalized understanding. Pt prefers verbal education style.     ICEBOAT? Timeout performed pre-treatment  I: Patient was identified using 2 identifiers  C:  Consent Signed Yes  E: Equipment preventative maintenance is current and dialysis delivery system OK to use  B: Hepatitis B Surface Antigen: negative; Draw Date: 01/08/2025      Hepatitis B Surface  Antibody: susceptible; Draw Date: 01/08/2025  O: Dialysis orders present and complete prior to treatment  A: Vascular access verified and assessed prior to treatment  T: Treatment was performed at a clinically appropriate time  ?: Patient was allowed to ask questions and address concerns prior to treatment  See Adult Hemodialysis flowsheet in [facility] for further details and post assessment.  Machine water alarm in place and functioning. Transducer pods intact and checked every 15min.   .  Chlorine/Chloramine water system checked every 4 hours.  Outpatient Dialysis at [facility]    Patient repositioned every 2 hours during the treatment.  Post treatment report given to SHANTA Vyas regarding 3.5L of fluid removed, last BP of 125/74, and patient pain rating of 0/10.     Please remove patient dressing on AVF and AVG needle sites 24 hours after dialysis. If leaking occurs please apply a Band-Aid.

## 2025-02-06 NOTE — PROGRESS NOTES
Care Management Follow Up    Length of Stay (days): 29    Expected Discharge Date: 02/08/2025     Concerns to be Addressed: other (see comments) (elevated risk)     Patient plan of care discussed at interdisciplinary rounds: Yes    Anticipated Discharge Disposition: Skilled Nursing Facility              Anticipated Discharge Services: None  Anticipated Discharge DME: None    Patient/family educated on Medicare website which has current facility and service quality ratings: no  Education Provided on the Discharge Plan: No  Patient/Family in Agreement with the Plan: yes    Referrals Placed by CM/SW:    Private pay costs discussed:     Discussed  Partnership in Safe Discharge Planning  document with patient/family: No     Handoff Completed: Yes, [facility] PCP: Internal handoff referral completed    Additional Information:  Patient will need wheelchair transport from TCU to dialysis upon discharge as she will not be safe to be transported by family.  Writer did contact the below agencies to get pricing:    [employer]:  [phone]  $95 base fee, $2.50 per mile.  If distance is over 15 miles away then cost will increase (they did not share by how much).    [employer]:  [phone]  $20 per assist to load and unload so round trip to dialysis would be $80 and mileage is 65 cents per mile.     Next Steps: Care coordination team will continue to follow and assist with discharge needs.    Tona Em RN Care Coordinator  [facility]  [phone]

## 2025-02-06 NOTE — H&P
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 Ref Range    GLUCOSE BY METER POCT 126 (H) 70 - 99 mg/dL   Glucose by meter   Result Value Ref Range    GLUCOSE BY METER POCT 126 (H) 70 - 99 mg/dL   Glucose by meter   Result Value Ref Range    GLUCOSE BY METER POCT 151 (H) 70 - 99 mg/dL   EKG 12-lead, tracing only   Result Value Ref Range    Systolic Blood Pressure  mmHg    Diastolic Blood Pressure  mmHg    Ventricular Rate 39 BPM    Atrial Rate 42 BPM    AZ Interval 126 ms    QRS Duration 86 ms     ms    QTc 389 ms    P Axis 38 degrees    R AXIS 71 degrees    T Axis 56 degrees    Interpretation ECG       Sinus bradycardia with sinus arrhythmia  Abnormal ECG  When compared with ECG of 02-Feb-2025 12:16, (unconfirmed)  Sinus rhythm has replaced Atrial flutter  Vent. rate has decreased by  57 bpm  ST no longer  depressed in Inferior leads  Nonspecific T wave abnormality no longer evident in Inferior leads  Nonspecific T wave abnormality no longer evident in Lateral leads  QT has lengthened     Blood gas arterial   Result Value Ref Range    pH Arterial 7.42 7.35 - 7.45    pCO2 Arterial 49 (H) 35 - 45 mm Hg    pO2 Arterial 78 (L) 80 - 105 mm Hg    FIO2 30     Bicarbonate Arterial 32 (H) 21 - 28 mmol/L    Base Excess/Deficit Arterial 6.7 (H) -3.0 - 3.0 mmol/L    Reza's Test Yes     Oxyhemoglobin Arterial 94 92 - 100 %    O2 Sat, Arterial 96.4 (H) 95.0 - 96.0 %    Narrative    In healthy individuals, oxyhemoglobin (O2Hb) and oxygen saturation (SO2) are approximately equal. In the presence of dyshemoglobins, oxyhemoglobin can be considerably lower than oxygen saturation.   CBC with platelets differential    Narrative    The following orders were created for panel order CBC with platelets differential.  Procedure                               Abnormality         Status                     ---------                               -----------         ------                     CBC with platelets and d...[[id number]]  Abnormal            Final result                 Please view results for these tests on the individual orders.   Basic metabolic panel   Result Value Ref Range    Sodium 132 (L) 135 - 145 mmol/L    Potassium 5.9 (H) 3.4 - 5.3 mmol/L    Chloride 92 (L) 98 - 107 mmol/L    Carbon Dioxide (CO2) 29 22 - 29 mmol/L    Anion Gap 11 7 - 15 mmol/L    Urea Nitrogen 63.0 (H) 8.0 - 23.0 mg/dL    Creatinine 3.38 (H) 0.51 - 0.95 mg/dL    GFR Estimate 13 (L) >60 mL/min/1.73m2    Calcium 9.8 8.8 - 10.4 mg/dL    Glucose 165 (H) 70 - 99 mg/dL   Ionized Calcium   Result Value Ref Range    Calcium Ionized Whole Blood 5.1 4.4 - 5.2 mg/dL   CBC with platelets and differential   Result Value Ref Range    WBC Count 6.5 4.0 - 11.0 10e3/uL    RBC Count 2.25 (L) 3.80 - 5.20 10e6/uL    Hemoglobin 7.2 (L) 11.7 - 15.7 g/dL    Hematocrit 23.5 (L)  35.0 - 47.0 %     (H) 78 - 100 fL    MCH 32.0 26.5 - 33.0 pg    MCHC 30.6 (L) 31.5 - 36.5 g/dL    RDW 17.2 (H) 10.0 - 15.0 %    Platelet Count 161 150 - 450 10e3/uL    % Neutrophils 76 %    % Lymphocytes 10 %    % Monocytes 10 %    % Eosinophils 3 %    % Basophils 0 %    % Immature Granulocytes 1 %    NRBCs per 100 WBC 0 <1 /100    Absolute Neutrophils 5.0 1.6 - 8.3 10e3/uL    Absolute Lymphocytes 0.6 (L) 0.8 - 5.3 10e3/uL    Absolute Monocytes 0.7 0.0 - 1.3 10e3/uL    Absolute Eosinophils 0.2 0.0 - 0.7 10e3/uL    Absolute Basophils 0.0 0.0 - 0.2 10e3/uL    Absolute Immature Granulocytes 0.1 <=0.4 10e3/uL    Absolute NRBCs 0.0 10e3/uL   TSH with free T4 reflex   Result Value Ref Range    TSH 5.67 (H) 0.30 - 4.20 uIU/mL   T4 free   Result Value Ref Range    Free T4 1.32 0.90 - 1.70 ng/dL   Glucose by meter   Result Value Ref Range    GLUCOSE BY METER POCT 201 (H) 70 - 99 mg/dL   Blood gas venous   Result Value Ref Range    pH Venous 7.27 (L) 7.32 - 7.43    pCO2 Venous 72 (H) 40 - 50 mm Hg    pO2 Venous 56 (H) 25 - 47 mm Hg    Bicarbonate Venous 33 (H) 21 - 28 mmol/L    Base Excess/Deficit Venous 5.1 (H) -3.0 - 3.0 mmol/L    FIO2 60     Oxyhemoglobin Venous 81 (H) 70 - 75 %    O2 Sat, Venous 82.6 (H) 70.0 - 75.0 %    Narrative    In healthy individuals, oxyhemoglobin (O2Hb) and oxygen saturation (SO2) are approximately equal. In the presence of dyshemoglobins, oxyhemoglobin can be considerably lower than oxygen saturation.   Comprehensive metabolic panel   Result Value Ref Range    Sodium 131 (L) 135 - 145 mmol/L    Potassium 6.1 (HH) 3.4 - 5.3 mmol/L    Carbon Dioxide (CO2) 29 22 - 29 mmol/L    Anion Gap 11 7 - 15 mmol/L    Urea Nitrogen 64.1 (H) 8.0 - 23.0 mg/dL    Creatinine 3.34 (H) 0.51 - 0.95 mg/dL    GFR Estimate 14 (L) >60 mL/min/1.73m2    Calcium 10.2 8.8 - 10.4 mg/dL    Chloride 91 (L) 98 - 107 mmol/L    Glucose 256 (H) 70 - 99 mg/dL    Alkaline Phosphatase 75 40 - 150 U/L    AST 17 0 - 45 U/L    ALT  24 0 - 50 U/L    Protein Total 6.4 6.4 - 8.3 g/dL    Albumin 3.5 3.5 - 5.2 g/dL    Bilirubin Total 0.3 <=1.2 mg/dL   CRP inflammation   Result Value Ref Range    CRP Inflammation 14.73 (H) <5.00 mg/L   Glucose by meter   Result Value Ref Range    GLUCOSE BY METER POCT 235 (H) 70 - 99 mg/dL   Glucose by meter   Result Value Ref Range    GLUCOSE BY METER POCT 357 (H) 70 - 99 mg/dL   Glucose by meter   Result Value Ref Range    GLUCOSE BY METER POCT 354 (H) 70 - 99 mg/dL   Glucose by meter   Result Value Ref Range    GLUCOSE BY METER POCT 330 (H) 70 - 99 mg/dL   Glucose by meter   Result Value Ref Range    GLUCOSE BY METER POCT 360 (H) 70 - 99 mg/dL   Blood gas venous   Result Value Ref Range    pH Venous 7.34 7.32 - 7.43    pCO2 Venous 57 (H) 40 - 50 mm Hg    pO2 Venous 54 (H) 25 - 47 mm Hg    Bicarbonate Venous 31 (H) 21 - 28 mmol/L    Base Excess/Deficit Venous 4.6 (H) -3.0 - 3.0 mmol/L    FIO2 30     Oxyhemoglobin Venous 84 (H) 70 - 75 %    O2 Sat, Venous 86.1 (H) 70.0 - 75.0 %    Narrative    In healthy individuals, oxyhemoglobin (O2Hb) and oxygen saturation (SO2) are approximately equal. In the presence of dyshemoglobins, oxyhemoglobin can be considerably lower than oxygen saturation.   Glucose by meter   Result Value Ref Range    GLUCOSE BY METER POCT 275 (H) 70 - 99 mg/dL     *Note: Due to a large number of results and/or encounters for the requested time period, some results have not been displayed. A complete set of results can be found in Results Review.       EKG results:   I have reviewed this patient's EKG with the following interpretation: Sinus bradycardia without ST segment anomalies        Celestina Ross NP February 5, 2025

## 2025-02-07 LAB
ANION GAP SERPL CALCULATED.3IONS-SCNC: 10 MMOL/L (ref 7–15)
BASE EXCESS BLDV CALC-SCNC: 6.2 MMOL/L (ref -3–3)
BASE EXCESS BLDV CALC-SCNC: 7.2 MMOL/L (ref -3–3)
BUN SERPL-MCNC: 49.8 MG/DL (ref 8–23)
CALCIUM SERPL-MCNC: 10 MG/DL (ref 8.8–10.4)
CHLORIDE SERPL-SCNC: 96 MMOL/L (ref 98–107)
CREAT SERPL-MCNC: 2.79 MG/DL (ref 0.51–0.95)
EGFRCR SERPLBLD CKD-EPI 2021: 17 ML/MIN/1.73M2
ERYTHROCYTE [DISTWIDTH] IN BLOOD BY AUTOMATED COUNT: 17 % (ref 10–15)
GLUCOSE BLDC GLUCOMTR-MCNC: 139 MG/DL (ref 70–99)
GLUCOSE BLDC GLUCOMTR-MCNC: 144 MG/DL (ref 70–99)
GLUCOSE BLDC GLUCOMTR-MCNC: 148 MG/DL (ref 70–99)
GLUCOSE BLDC GLUCOMTR-MCNC: 152 MG/DL (ref 70–99)
GLUCOSE BLDC GLUCOMTR-MCNC: 158 MG/DL (ref 70–99)
GLUCOSE BLDC GLUCOMTR-MCNC: 172 MG/DL (ref 70–99)
GLUCOSE BLDC GLUCOMTR-MCNC: 180 MG/DL (ref 70–99)
GLUCOSE SERPL-MCNC: 150 MG/DL (ref 70–99)
HCO3 BLDV-SCNC: 33 MMOL/L (ref 21–28)
HCO3 BLDV-SCNC: 33 MMOL/L (ref 21–28)
HCO3 SERPL-SCNC: 29 MMOL/L (ref 22–29)
HCT VFR BLD AUTO: 24.2 % (ref 35–47)
HGB BLD-MCNC: 7.4 G/DL (ref 11.7–15.7)
HGB BLD-MCNC: 7.4 G/DL (ref 11.7–15.7)
MAGNESIUM SERPL-MCNC: 2.3 MG/DL (ref 1.7–2.3)
MCH RBC QN AUTO: 32.2 PG (ref 26.5–33)
MCHC RBC AUTO-ENTMCNC: 29.8 G/DL (ref 31.5–36.5)
MCV RBC AUTO: 104 FL (ref 78–100)
O2/TOTAL GAS SETTING VFR VENT: 2 %
O2/TOTAL GAS SETTING VFR VENT: 60 %
OXYHGB MFR BLDV: 78 % (ref 70–75)
OXYHGB MFR BLDV: 92 % (ref 70–75)
PCO2 BLDV: 58 MM HG (ref 40–50)
PCO2 BLDV: 61 MM HG (ref 40–50)
PH BLDV: 7.34 [PH] (ref 7.32–7.43)
PH BLDV: 7.37 [PH] (ref 7.32–7.43)
PLATELET # BLD AUTO: 210 10E3/UL (ref 150–450)
PO2 BLDV: 47 MM HG (ref 25–47)
PO2 BLDV: 72 MM HG (ref 25–47)
POTASSIUM SERPL-SCNC: 4.3 MMOL/L (ref 3.4–5.3)
RBC # BLD AUTO: 2.33 10E6/UL (ref 3.8–5.2)
SAO2 % BLDV: 79.9 % (ref 70–75)
SAO2 % BLDV: 94.2 % (ref 70–75)
SODIUM SERPL-SCNC: 135 MMOL/L (ref 135–145)
WBC # BLD AUTO: 6.2 10E3/UL (ref 4–11)

## 2025-02-07 PROCEDURE — 250N000009 HC RX 250: Performed by: INTERNAL MEDICINE

## 2025-02-07 PROCEDURE — 250N000013 HC RX MED GY IP 250 OP 250 PS 637: Performed by: NURSE PRACTITIONER

## 2025-02-07 PROCEDURE — 82805 BLOOD GASES W/O2 SATURATION: CPT | Performed by: INTERNAL MEDICINE

## 2025-02-07 PROCEDURE — 94640 AIRWAY INHALATION TREATMENT: CPT | Mod: 76

## 2025-02-07 PROCEDURE — 90937 HEMODIALYSIS REPEATED EVAL: CPT

## 2025-02-07 PROCEDURE — 94660 CPAP INITIATION&MGMT: CPT

## 2025-02-07 PROCEDURE — 999N000157 HC STATISTIC RCP TIME EA 10 MIN

## 2025-02-07 PROCEDURE — 250N000013 HC RX MED GY IP 250 OP 250 PS 637: Performed by: HOSPITALIST

## 2025-02-07 PROCEDURE — 99291 CRITICAL CARE FIRST HOUR: CPT | Performed by: INTERNAL MEDICINE

## 2025-02-07 PROCEDURE — 250N000009 HC RX 250: Performed by: HOSPITALIST

## 2025-02-07 PROCEDURE — 250N000011 HC RX IP 250 OP 636: Performed by: HOSPITALIST

## 2025-02-07 PROCEDURE — 250N000013 HC RX MED GY IP 250 OP 250 PS 637: Performed by: INTERNAL MEDICINE

## 2025-02-07 PROCEDURE — 82805 BLOOD GASES W/O2 SATURATION: CPT | Performed by: NURSE PRACTITIONER

## 2025-02-07 PROCEDURE — 200N000001 HC R&B ICU

## 2025-02-07 PROCEDURE — 85027 COMPLETE CBC AUTOMATED: CPT | Performed by: INTERNAL MEDICINE

## 2025-02-07 PROCEDURE — 36415 COLL VENOUS BLD VENIPUNCTURE: CPT | Performed by: INTERNAL MEDICINE

## 2025-02-07 PROCEDURE — 94640 AIRWAY INHALATION TREATMENT: CPT

## 2025-02-07 PROCEDURE — 99232 SBSQ HOSP IP/OBS MODERATE 35: CPT | Performed by: INTERNAL MEDICINE

## 2025-02-07 PROCEDURE — 80048 BASIC METABOLIC PNL TOTAL CA: CPT | Performed by: HOSPITALIST

## 2025-02-07 PROCEDURE — 250N000011 HC RX IP 250 OP 636: Performed by: INTERNAL MEDICINE

## 2025-02-07 PROCEDURE — 258N000003 HC RX IP 258 OP 636: Performed by: INTERNAL MEDICINE

## 2025-02-07 PROCEDURE — 250N000012 HC RX MED GY IP 250 OP 636 PS 637: Performed by: INTERNAL MEDICINE

## 2025-02-07 PROCEDURE — 83735 ASSAY OF MAGNESIUM: CPT | Performed by: HOSPITALIST

## 2025-02-07 RX ORDER — MIDODRINE HYDROCHLORIDE 5 MG/1
5 TABLET ORAL
Status: DISCONTINUED | OUTPATIENT
Start: 2025-02-08 | End: 2025-02-08

## 2025-02-07 RX ORDER — LIDOCAINE HYDROCHLORIDE 10 MG/ML
1 INJECTION, SOLUTION EPIDURAL; INFILTRATION; INTRACAUDAL; PERINEURAL ONCE
Status: DISCONTINUED | OUTPATIENT
Start: 2025-02-07 | End: 2025-02-08

## 2025-02-07 RX ORDER — QUETIAPINE FUMARATE 50 MG/1
50 TABLET, FILM COATED ORAL AT BEDTIME
Status: DISCONTINUED | OUTPATIENT
Start: 2025-02-07 | End: 2025-02-26 | Stop reason: HOSPADM

## 2025-02-07 RX ADMIN — ONDANSETRON 4 MG: 2 INJECTION, SOLUTION INTRAMUSCULAR; INTRAVENOUS at 06:07

## 2025-02-07 RX ADMIN — Medication 60 ML: at 09:21

## 2025-02-07 RX ADMIN — TACROLIMUS: 1 OINTMENT TOPICAL at 20:07

## 2025-02-07 RX ADMIN — LIDOCAINE HYDROCHLORIDE 1 ML: 10 INJECTION, SOLUTION EPIDURAL; INFILTRATION; INTRACAUDAL; PERINEURAL at 14:03

## 2025-02-07 RX ADMIN — AMIODARONE HYDROCHLORIDE 200 MG: 200 TABLET ORAL at 09:06

## 2025-02-07 RX ADMIN — CETIRIZINE HYDROCHLORIDE 5 MG: 5 TABLET ORAL at 22:15

## 2025-02-07 RX ADMIN — SENNOSIDES 10 ML: 8.8 LIQUID ORAL at 20:06

## 2025-02-07 RX ADMIN — INSULIN ASPART 1 UNITS: 100 INJECTION, SOLUTION INTRAVENOUS; SUBCUTANEOUS at 12:04

## 2025-02-07 RX ADMIN — INSULIN ASPART 1 UNITS: 100 INJECTION, SOLUTION INTRAVENOUS; SUBCUTANEOUS at 09:15

## 2025-02-07 RX ADMIN — Medication 5 ML: at 09:14

## 2025-02-07 RX ADMIN — IPRATROPIUM BROMIDE AND ALBUTEROL SULFATE 3 ML: .5; 3 SOLUTION RESPIRATORY (INHALATION) at 19:30

## 2025-02-07 RX ADMIN — INSULIN ASPART 1 UNITS: 100 INJECTION, SOLUTION INTRAVENOUS; SUBCUTANEOUS at 05:49

## 2025-02-07 RX ADMIN — INSULIN ASPART 1 UNITS: 100 INJECTION, SOLUTION INTRAVENOUS; SUBCUTANEOUS at 20:04

## 2025-02-07 RX ADMIN — PROCHLORPERAZINE EDISYLATE 5 MG: 5 INJECTION INTRAMUSCULAR; INTRAVENOUS at 08:13

## 2025-02-07 RX ADMIN — INSULIN ASPART 1 UNITS: 100 INJECTION, SOLUTION INTRAVENOUS; SUBCUTANEOUS at 16:22

## 2025-02-07 RX ADMIN — MICONAZOLE NITRATE: 2 POWDER TOPICAL at 09:16

## 2025-02-07 RX ADMIN — Medication 5 ML: at 12:08

## 2025-02-07 RX ADMIN — PANTOPRAZOLE SODIUM 40 MG: 40 INJECTION, POWDER, FOR SOLUTION INTRAVENOUS at 09:06

## 2025-02-07 RX ADMIN — IPRATROPIUM BROMIDE AND ALBUTEROL SULFATE 3 ML: .5; 3 SOLUTION RESPIRATORY (INHALATION) at 07:48

## 2025-02-07 RX ADMIN — BUDESONIDE 1 MG: 1 INHALANT ORAL at 07:48

## 2025-02-07 RX ADMIN — BUDESONIDE 1 MG: 1 INHALANT ORAL at 20:31

## 2025-02-07 RX ADMIN — IPRATROPIUM BROMIDE AND ALBUTEROL SULFATE 3 ML: .5; 3 SOLUTION RESPIRATORY (INHALATION) at 11:55

## 2025-02-07 RX ADMIN — Medication: at 13:24

## 2025-02-07 RX ADMIN — QUETIAPINE FUMARATE 50 MG: 50 TABLET ORAL at 22:15

## 2025-02-07 RX ADMIN — SERTRALINE HYDROCHLORIDE 75 MG: 50 TABLET ORAL at 20:06

## 2025-02-07 RX ADMIN — Medication 60 ML: at 20:06

## 2025-02-07 RX ADMIN — ACETAMINOPHEN 650 MG: 325 TABLET, FILM COATED ORAL at 06:07

## 2025-02-07 RX ADMIN — SENNOSIDES 10 ML: 8.8 LIQUID ORAL at 09:14

## 2025-02-07 RX ADMIN — APIXABAN 5 MG: 5 TABLET, FILM COATED ORAL at 20:06

## 2025-02-07 RX ADMIN — INSULIN GLARGINE 15 UNITS: 100 INJECTION, SOLUTION SUBCUTANEOUS at 20:05

## 2025-02-07 RX ADMIN — ACETAMINOPHEN 650 MG: 325 TABLET, FILM COATED ORAL at 18:23

## 2025-02-07 RX ADMIN — SODIUM ZIRCONIUM CYCLOSILICATE 10 G: 5 POWDER, FOR SUSPENSION ORAL at 01:20

## 2025-02-07 RX ADMIN — TACROLIMUS: 1 OINTMENT TOPICAL at 09:16

## 2025-02-07 RX ADMIN — NOREPINEPHRINE BITARTRATE 0.03 MCG/KG/MIN: 0.02 INJECTION, SOLUTION INTRAVENOUS at 08:17

## 2025-02-07 RX ADMIN — INSULIN GLARGINE 15 UNITS: 100 INJECTION, SOLUTION SUBCUTANEOUS at 09:15

## 2025-02-07 RX ADMIN — Medication 50 MCG: at 09:06

## 2025-02-07 RX ADMIN — IPRATROPIUM BROMIDE AND ALBUTEROL SULFATE 3 ML: .5; 3 SOLUTION RESPIRATORY (INHALATION) at 16:18

## 2025-02-07 RX ADMIN — MICONAZOLE NITRATE: 2 POWDER TOPICAL at 20:07

## 2025-02-07 RX ADMIN — SODIUM CHLORIDE 200 ML: 9 INJECTION, SOLUTION INTRAVENOUS at 13:24

## 2025-02-07 RX ADMIN — APIXABAN 5 MG: 5 TABLET, FILM COATED ORAL at 09:06

## 2025-02-07 RX ADMIN — SODIUM CHLORIDE 250 ML: 9 INJECTION, SOLUTION INTRAVENOUS at 13:24

## 2025-02-07 RX ADMIN — Medication 1 MG: at 20:06

## 2025-02-07 RX ADMIN — SODIUM CHLORIDE 500 ML: 9 INJECTION, SOLUTION INTRAVENOUS at 13:24

## 2025-02-07 RX ADMIN — ATORVASTATIN CALCIUM 20 MG: 20 TABLET, FILM COATED ORAL at 20:06

## 2025-02-07 ASSESSMENT — ACTIVITIES OF DAILY LIVING (ADL)
ADLS_ACUITY_SCORE: 80

## 2025-02-07 NOTE — PROGRESS NOTES
Renal Medicine Progress Note            Assessment/Plan:     75 y.o woman with ESRD, admitted for respiratory distress due to influenza A infection.      # ESRD:               -TTS               -RAVF               -Dr. Basilio               [facility]                 # Influenza A:               -finished Tamiflu     # FEN: hypekalemia.  -Recurrent hyperkalemia despite regular dialysis.  Likely partly driven by respiratory acidosis and cellular shift  -Better today     # Anemia: Hgb is below target                -epo with HD    # Volume overload , acute hypoxic/hypercarbic respiratory failure-chest x-ray with pulm edema     Plan:  # UF only session today.  3 hours, 3 L ultrafiltration.  Albumin and Levophed support if needed  Repeat dialysis tomorrow   # Noted patient moved to Monday Wednesday Friday schedule at her outpatient [facility].  Switch to Monday Wednesday Friday next week.    Critically ill patient with high risk of decompensation.  Daily dialysis planned as listed above.          Interval History:     Seen/examined.  Dialyzed yesterday with 3.2 L ultrafiltration.  On and off Levophed.  Remains confused, restless           Medications and Allergies:     Current Facility-Administered Medications   Medication Dose Route Frequency Provider Last Rate Last Admin    - MEDICATION INSTRUCTIONS for Dialysis Patients -   Does not apply See Admin Instructions Braden Montana MD        amiodarone (PACERONE) tablet 200 mg  200 mg Oral Daily Cheo Farris MD   200 mg at 02/07/25 0906    apixaban ANTICOAGULANT (ELIQUIS) tablet 5 mg  5 mg Oral or Feeding Tube BID Denver Shipman MD   5 mg at 02/07/25 0906    atorvastatin (LIPITOR) tablet 20 mg  20 mg Oral or Feeding Tube QPM Denver Shipman MD   20 mg at 02/06/25 2026    B and C vitamin Complex with folic acid (NEPHRONEX) liquid 5 mL  5 mL Per Feeding Tube Daily Denver Shipman MD   5 mL at 02/07/25 0914    budesonide (PULMICORT) neb solution  1 mg  1 mg Nebulization BID Denver Shipman MD   1 mg at 02/07/25 0748    cetirizine (zyrTEC) tablet 5 mg  5 mg Oral or Feeding Tube At Bedtime Denver Shipman MD   5 mg at 02/06/25 2151    [Held by provider] diltiazem (CARDIZEM) tablet 60 mg  60 mg Per G Tube Q6H VÍCTOR Liset Romero MD   60 mg at 02/05/25 1526    furosemide (LASIX) tablet 80 mg  80 mg Oral or Feeding Tube Daily Enu-Vivian Samaniego MD        heparin lock flush 10 unit/mL injection 5-20 mL  5-20 mL Intracatheter Q24H Liset Romero MD   5 mL at 02/05/25 1042    insulin aspart (NovoLOG) injection (RAPID ACTING)  1-4 Units Subcutaneous Q4H Eren Mandel MD   1 Units at 02/07/25 0915    insulin glargine (LANTUS PEN) injection 15 Units  15 Units Subcutaneous BID Denver Shipman MD   15 Units at 02/07/25 0915    ipratropium - albuterol 0.5 mg/2.5 mg/3 mL (DUONEB) neb solution 3 mL  3 mL Nebulization 4x daily Denver Shipman MD   3 mL at 02/07/25 0748    miconazole (MICATIN) 2 % powder   Topical BID Denver Shipman MD   Given at 02/07/25 0916    No heparin via hemodialysis machine   Does not apply Once Davide Madrid MD        pantoprazole (PROTONIX) 2 mg/mL suspension 40 mg  40 mg Per Feeding Tube QAM Denver Mays MD   40 mg at 02/06/25 0829    Or    pantoprazole (PROTONIX) IV push injection 40 mg  40 mg Intravenous QAM Denver Mays MD   40 mg at 02/07/25 0906    polyethylene glycol (MIRALAX) Packet 17 g  17 g Oral or Feeding Tube Daily Liset Romero MD   17 g at 02/06/25 0828    Prosource TF20 ENfit Compatibl EN LIQD (PROSOURCE TF20) packet 60 mL  1 packet Per Feeding Tube BID Enu-Vivian Samaniego MD   60 mL at 02/07/25 0921    sennosides (SENOKOT) syrup 10 mL  10 mL Oral or Feeding Tube BID Liset Romero MD   10 mL at 02/07/25 0914    sertraline (ZOLOFT) tablet 75 mg  75 mg Oral or Feeding Tube QPM Denver Shipman MD   75 mg at 02/06/25 2026    [Held by provider] sevelamer carbonate (RENVELA)  "tablet 800 mg  800 mg Oral TID w/meals Denver Shipman MD   800 mg at 02/06/25 0828    sodium chloride 0.9% BOLUS 200 mL  200 mL Hemodialysis Machine Once Davide Madrid MD        sodium chloride 0.9% BOLUS 250 mL  250 mL Intravenous Once in dialysis/CRRT Davide Madrid MD        sodium chloride 0.9% BOLUS 500 mL  500 mL Hemodialysis Machine Once Davide Madrid MD        sodium zirconium cyclosilicate (LOKELMA) packet 10 g  10 g Oral TID Celestina Ross NP   10 g at 02/07/25 0120    Followed by    [START ON 2/8/2025] sodium zirconium cyclosilicate (LOKELMA) packet 10 g  10 g Oral Daily Celestina Ross NP        tacrolimus (PROTOPIC) 0.1 % ointment   Topical BID Denver Shipman MD   Given at 02/07/25 0916    vitamin D3 (CHOLECALCIFEROL) tablet 50 mcg  50 mcg Oral or Feeding Tube Daily Denver Shipman MD   50 mcg at 02/07/25 0906        Allergies   Allergen Reactions    Ampicillin-Sulbactam Sodium Rash     No evidence SJS, but very uncomfortable and precipitated multiple provider visits. Would not use penicillins again if other options available.     Penicillins Rash            Physical Exam:   Vitals were reviewed   , Blood pressure (!) 145/47, pulse 67, temperature 98  F (36.7  C), temperature source Oral, resp. rate 20, height 1.702 m (5' 7.01\"), weight 96.1 kg (211 lb 13.8 oz), SpO2 93%, not currently breastfeeding.    Wt Readings from Last 3 Encounters:   02/07/25 96.1 kg (211 lb 13.8 oz)   06/14/24 101.1 kg (222 lb 14.2 oz)   06/03/24 101.1 kg (222 lb 14.2 oz)       GENERAL APPEARANCE: Restless  RESP: Diminished at bases.  Few crackles  CV: irregularly, irregular.  ABDOMEN: obese, soft. NT  EXTREMITIES/SKIN: + Edema         Data:     CBC RESULTS:     Recent Labs   Lab 02/07/25  0546 02/06/25  1020 02/05/25  1736 02/02/25  0542 02/01/25  0553   WBC  --  9.2 6.5 5.6 6.4   RBC  --  2.40* 2.25* 2.41* 2.45*   HGB 7.4* 7.7* 7.2* 7.6* 7.8*   HCT  --  25.0* 23.5* 25.3* 25.4*   PLT  --  199 161 170 175 "       Basic Metabolic Panel:  Recent Labs   Lab 02/07/25  0807 02/07/25  0548 02/07/25  0546 02/07/25  0409 02/07/25  0013 02/06/25  2048 02/06/25  0958 02/06/25  0836 02/06/25  0429 02/06/25  0416 02/06/25  0115 02/06/25  0109 02/05/25 2006 02/05/25  2004 02/05/25  1836 02/05/25  1736 02/05/25  1249 02/05/25  0631   NA  --   --  135  --   --   --   --  134*  --  133*  --   --   --  131*  --  132*  --  134*   POTASSIUM  --   --  4.3  --   --  4.0  --  5.7*  --  5.4*  5.4*  --  5.9*  --  6.1*  --  5.9*  --  4.9   CHLORIDE  --   --  96*  --   --   --   --  94*  --  94*  --   --   --  91*  --  92*  --  95*   CO2  --   --  29  --   --   --   --  29  --  28  --   --   --  29  --  29  --  30*   BUN  --   --  49.8*  --   --   --   --  77.9*  --  76.6*  --   --   --  64.1*  --  63.0*  --  49.2*   CR  --   --  2.79*  --   --   --   --  3.95*  --  3.88*  --   --   --  3.34*  --  3.38*  --  2.91*   * 152* 150* 148* 139* 86   < > 77   < > 233*   < >  --    < > 256*   < > 165*   < > 174*   JODY  --   --  10.0  --   --   --   --  9.9  --  9.8  --   --   --  10.2  --  9.8  --  9.5    < > = values in this interval not displayed.       INRNo lab results found in last 7 days.   Attestation:   I have reviewed today's relevant vital signs, notes, medications, labs and imaging.    Davide Madrid MD  [facility] - Nephrology   [phone]

## 2025-02-07 NOTE — PROGRESS NOTES
CLINICAL NUTRITION SERVICES - REASSESSMENT NOTE     Malnutrition Status:    % Intake: Decreased intake does not meet criteria (receiving nocturnal TF)  % Weight Loss: Weight loss does not meet criteria  (fluids)  Subcutaneous Fat Loss: None observed (however may be masked by obesity)  Muscle Loss: Globally moderate with prolonged hospital stay   Fluid Accumulation/Edema: Mild, 1+ (generalized)  Malnutrition Diagnosis: Patient does not meet two of the established criteria necessary for diagnosing malnutrition but is at risk for malnutrition  Malnutrition Present on Admission: Unable to assess    Registered Dietitian Interventions:  Collaboration by nutrition professional with other providers - Patient discussed during rounds --> Family requested to medical student potential for TF decrease in hopes to stimulate appetite. Discussed at length with med student and Dr. Watt -- currently TF running at night at meeting 75% energy needs --> would not decrease volume at this time as patient not eating much d/t respiratory status (frequent Bipap) + somnolence, agreed to keep TF as is for now (over 14 hours at night) and re-eval on Monday   If patient is able to increase intake may be able to reduce nocturnal TF at that time        SUBJECTIVE INFORMATION  Patient not available for interview due to medical status, confused and sleepy    CURRENT NUTRITION ORDERS  Diet: Level 4: Pureed Dysphagia Diet  Nutrition Support: Patient was switched to nocturnal TF on 2/4 and continues as follows ~    Nutrition Support Enteral:  Type of Feeding Tube: Nasoduodenal   Enteral Frequency:  Cyclic over 14 hours per night   Enteral Regimen: [employer] 1.4 at 55 mL/hr x 14 hours + 2 pkts ProSource TF20 per day   Total Enteral Provisions: 1238 kcal (19 kcal/kg and ~3/4 energy needs), 88 g protein (1.3 g/kg), 121 g CHO, 12 g fiber, 547 mL H2O  Free Water Flush: 30 mL every 4 hours (per MD)      CURRENT INTAKE/TOLERANCE  Oral intake has been  minimal over the last few days  Suspecting combination of decrease in respiratory status (has needed Bipap on and off over the last few days) + somnolence     Tolerating above TF without any documented issues (aside from some slight nausea this am -- Zofran + Compazine given per rounds)     NEW FINDINGS  Weight: 96.1 kg (down with Lasix + HD), I/O 1248/3200  Skin/wounds: WOCN following for left buttock friction wound   GI symptoms: Nausea as noted above, per Med Student -- daughter thinks patient not hungry d/t TF   Nutrition-relevant labs:  K/Mg normal, BUN 49.8 (H), Cr 2.79 (H) - HD, BGM  with Low sliding scale insulin + Lantus 15 units BID   Nutrition-relevant medications:  Lasix, Norepi drip, Miralax/Senna, Vitamin D3, Nephronex, Vitamin D3, Low sliding scale insulin, Lantus 15 units BID     2/5: Increased resp distress (RRT), Hypotension --> Transfer to ICU, Bipap     ASSESSED NUTRITION NEEDS (DW = 65.5 kg (adjusted))  Estimated Energy Needs: 4670-5253 kcals/day (25-30 kcals/kg)  Justification: Obese  Estimated Protein Needs:  grams protein/day (1.2-1.5 grams of pro/kg)  Justification: Hypercatabolism with acute illness, HD  Estimated Fluid Needs: 1123-8464 mL/day (1 mL/kcal)  Justification:  or per MD     MALNUTRITION  % Intake: Decreased intake does not meet criteria (receiving nocturnal TF)  % Weight Loss: Weight loss does not meet criteria  (fluids)  Subcutaneous Fat Loss: None observed (however may be masked by obesity)  Muscle Loss:  Globally moderate with prolonged hospital stay   Fluid Accumulation/Edema: Mild, 1+ (generalized)  Malnutrition Diagnosis: Patient does not meet two of the established criteria necessary for diagnosing malnutrition but is at risk for malnutrition  Malnutrition Present on Admission: Unable to assess    EVALUATION OF THE PROGRESS TOWARD GOALS   Previous Goals (2/4)  EN - to meet  72% energy needs and 100% PRO needs while po increasing - Met   PO - to continue to  increase to be able to wean EN support - Unable to meet     Previous Nutrition Diagnosis (2/4)  Inadequate oral intake related to diet restrictions, previous mild dysphagia now improving as evidenced by diet just advanced to Soft and Bite Sized diet, but intakes still slow to increase (25%) and reliant on EN support to meet nutrition needs until able to take in adequate po intake   Evaluation: Declining    NUTRITION DIAGNOSIS  Inadequate oral intake related to dysphagia diet, respiratory status, somnolence, TF reliance as evidenced by taking minimal PO over the last several days, continued need for TF     INTERVENTIONS  Collaboration by nutrition professional with other providers - Patient discussed during rounds --> Family requested to medical student potential for TF decrease in hopes to stimulate appetite. Discussed at length with med student and Dr. Watt -- currently TF running at night at meeting 75% energy needs --> would not decrease volume at this time as patient not eating much d/t respiratory status (frequent Bipap) + somnolence, agreed to keep TF as is for now (over 14 hours at night) and re-eval on Monday   If patient is able to increase intake may be able to reduce nocturnal TF at that time     Goals  Patient will transition from TF to PO in the next 4 days      Monitoring/Evaluation      Progress toward goals will be monitored and evaluated per policy    Miguelina Feliciano, RD, LD, CNSC   Clinical Dietitian - [facility]

## 2025-02-07 NOTE — PLAN OF CARE
Problem: Fall Injury Risk  Goal: Absence of Fall and Fall-Related Injury  Outcome: Progressing  Intervention: Identify and Manage Contributors  Recent Flowsheet Documentation  Taken 2/7/2025 0400 by Rachel Leal RN  Medication Review/Management:   medications reviewed   high-risk medications identified  Taken 2/7/2025 0000 by Rachel Leal RN  Medication Review/Management:   medications reviewed   high-risk medications identified  Taken 2/6/2025 2000 by Rachel Leal RN  Medication Review/Management:   medications reviewed   high-risk medications identified  Intervention: Promote Injury-Free Environment  Recent Flowsheet Documentation  Taken 2/7/2025 0400 by Rachel Leal RN  Safety Promotion/Fall Prevention:   activity supervised   assistive device/personal items within reach   clutter free environment maintained   lighting adjusted   nonskid shoes/slippers when out of bed   room near nurse's station   room organization consistent  Taken 2/7/2025 0000 by Rachel Leal RN  Safety Promotion/Fall Prevention:   activity supervised   assistive device/personal items within reach   clutter free environment maintained   lighting adjusted   nonskid shoes/slippers when out of bed   room near nurse's station   room organization consistent  Taken 2/6/2025 2000 by Rachel Leal RN  Safety Promotion/Fall Prevention:   activity supervised   assistive device/personal items within reach   clutter free environment maintained   lighting adjusted   nonskid shoes/slippers when out of bed   room near nurse's station   room organization consistent   Goal Outcome Evaluation:        Summary: Influenza     [id number]  Neuro: A/O x2, forgetful. Moves all extremities, follows commands.      CV/Tele: NSR. MAP > 65. Levo infusing.      Resp: On int BiPAP at 30%, Oxymask at 10 L otherwise. LS dem.      GI/: Oliguric on dialysis. TF cycle 7767-1570, started 2000. NG in place. Pureed diet, poor appetite.      Skin:  Mepilex to coccyx. L AKA. Miconazole powder applied to folds.      IV/infusion: R internal jugular, L fistula. Insulin on alg 3, TKO.      Followed By: ICU, speech, cardiology consult.

## 2025-02-07 NOTE — PROGRESS NOTES
Critical Care  Note      02/07/2025    Name: Supriya Herr MRN#: [id number]   Age: 75 year old YOB: 1950     \[facility] [place] [facility]\""tl Day# 30  ICU DAY # 2    MV DAY # 0             Problem List:   Principal Problem:    Acute and chronic respiratory failure with hypoxia (H)  Active Problems:    Type 2 diabetes mellitus with diabetic chronic kidney disease (H)    ESRD on dialysis (H)    ESRD (end stage renal disease) on dialysis (H)    Influenza A    Elevated troponin    Abnormal chest x-ray    Anemia, unspecified type    Acute metabolic encephalopathy    Severe sepsis with acute organ dysfunction (H)    Pleural effusion           Summary/Hospital Course:     Patient was admitted, subsequently intubated for worsening respiratory failure and remained on mechanical ventilator 1/9 - 1/18. She was reintubated and remained on mechanical ventilator 1/20 - 1/25. Was treated with Tamiflu, IV antibiotic, steroid, has completed courses. Hospital course complicated by A-fib RVR. Was on amiodarone drip with eventual transition to p.o. Also required diltiazem for rate control. Required BiPAP--HFNC--intermittent BiPAP use. Ongoing tube feeding and modified diet per SLP. Delirium managed with Seroquel. Concern for recurrent pneumonia, hospital-acquired on 2/1, restarted on antibiotics. ID consulted, recommended discontinuing antibiotics, respiratory failure likely related to fluid overload.      Required transfer back to ICU on 2/5/25 for hypotension associated with bradycardia requiring dopamine infusion suspected 2/2 rate control medications. Rate improved and dopamine discontinued 2/5/25 at 2345. Cardiology consulted.     Hypoxic and hypercarbic respiratory failure improving with volume removal with iHD.     Interval/overnight events: Dialyzed yesterday with 3.2 L pulled in 3.15 hours.  Soft Bps during dialysis, and levophed was started with 1.5 hours left. No acute overnight events.     Today, patient was awake in bed,  conversational. Endorsing nausea, no vomiting, which quickly improved with compazine. Denies any fever/chills, cough, chest pain, shortness of breath, abdominal pain or chest pain. Still not making urine. Last bowel movement was yesterday.        Assessment and plan :     Supriya Herr is a 75 year old female PMH significant for ESRD on HD, HFpEF, COPD, DM type II, hypertension, and poor mobility (left AKA, Obesity, WC bound). Initially admitted 1/8/2025 for treatment of influenza A with COPD exacerbation complicated by Afib with RVR, volume overload, and delirium. Volume status improving with daily HD per Nephrology which started 2/6/25 with 3.2 L pulled and improvement in respiratory status. Soft Bps during dialysis yesterday, so levophed was started with plan to wean but anticipate may be needed again during HD today.    I have personally reviewed the daily labs, imaging studies, cultures and discussed the case with referring physician and consulting physicians.     My assessment and plan by system for this patient is as follows:    Neurology/Psychiatry:   # Delirium  # Anxiety  Improved today. Has no to need prn Ativan  - Limit benzodiazepines; held prn Ativan.  - Discontinue Seroquel 25 mg am  - Continue Seroquel 50 mg PM  - Zoloft 75 mg at bedtime  - Spiritual health consulted give lengthy hospital stay    Cardiovascular:   # Afib with RVR   # Acute on chronic HFpEF (LVEF 60% on 1/21/25)  # Symptomatic bradycardia, suspect medication induced - resolved  Had difficult to control Afib with RVR. Was on Amiodarone 200 mg every day, carvedilol 25 mg every day, and diliazem 60 mg q6h. On 2/5/25 developed bradycardia associated with hypotension. Improved with initiation of dopamine --> discontinued on 2/5 at 23:45. Has maintained NSR without bradycardia. Cardiology signed off 2/7.  - Tele and VS per ICU routine  - Holding carvedilol, and diltiazem  - Cardiology consulted, appreciate recs   - continue amiodarone  only, decrease to 200 mg daily  - continue atorvastatin 20 mg at bedtime   - continue apixaban 5 mg BID    Pulmonary/Ventilator Management:   # Acute hypoxic and hypercarbic respiratory failure 2/2 volume overload    # COPD exacerbation  # HTN  Respiratory status improved following HD yesterday; satting at 100% with 5 L via NC this morning. Turned down to 1.5 L via NC to maintain goal SpO2 ~ 92%.  - CXR 2/6 prior to iHD with worsened congestion, and increasing dense consolidation of left lung consider most likely atelectasis (see ID below).  - volume removal with iHD per nephrology  - trend VBG  - continue duonebs QID & budesonide BID  - continue cetirizine 5 mg at bedtime  - continue BiPAP at night, settings are 15/6 30%    GI and Nutrition :   # Constipation  - noted on CT 2/2   - Scheduled sennosides 10 ml BID, miralax once daily   - PRN senna tablets      # Dysphagia   Suspect due to recurrent intubation.  - Dietitian consulted.  - Nasoduodenal tube in place. Continue tube feedings for now; 4 pm to 6 am at 55 ml/hr.  - Did well with video swallow study on 1/31/25, diet is being advanced --> Pureed with thins.    Renal/Fluids/Electrolytes:   # ESRD on HD (T, Th, Sat --> M, W, F starting next week- makes minimal urine)  # Hyperkalemia 2/2 above - improving  # Hypervolemic hyponatremia - improving  # Volume overload  # Intradialytic hypotension   K+ 6.1 on ICU arrival --> Shifted with insulin & dextrose. Hemodialysis yesterday with 3.2 L removed. Repeating dialysis today and tomorrow. Following dialysis yesterday potassium improved from 5.7 -->4.0. Sodium also improved since HD yesterday. Patient did have soft Bps during HD yesterday with MAP down to 49 --> started on norepi. Bps improving and plan to wean off norepi today.  - hold lokelma  - Trend K+   - iHD planned today and tomorrow  - Continue sevelamer, vit D3  - Nephrology resumed Lasix 50 mg po on nondialysis days on 2/3  - daily weights   - Start midodrine  5mg to be given before dialysis on dialysis days only    Infectious Disease:   # Influenza A, s/p treatment  # HCAP, s/p treatment   Continuing to monitor off antibiotics. CXR consistent with fluid overload, but with increasing dense consolidation of left lower lung and right upper lobe. Considering atelectasis vs PNA.  - Reevaluate CXR post-HD this afternoon or tomorrow morning  - Continue to monitor  - No fevers or leukocytosis  - Send UA per daughter's request if able to collect urine    Endocrine:   # DM2  HbA1c 6.5% on 1/17/25. On tube feeds from 4 pm to 6 am at 55 ml/hr.  - Sliding scale insulin (high) q4h  - Insulin glargine 15 units BID     Hematology/Oncology:   # Anemia of chronic disease  Hemoglobin 7-8, at baseline. Epo with HD per nephrology.  - continue to monitor     IV/Access:   - AV fistula  - CVC R internal jugular  - Small bore feeding tube      ICU Prophylaxis:   1. DVT: DOAC  2. Stress Ulcer: PPI is a home medication   3. Family Update: Updated daughter (Caroline) this morning  4. Disposition - Consider dispo to IMC pending HD pending pressor needs     Clinically Significant Risk Factors        # Hyperkalemia: Highest K = 6.1 mmol/L in last 2 days, will monitor as appropriate  # Hyponatremia: Lowest Na = 131 mmol/L in last 2 days, will monitor as appropriate  # Hypochloremia: Lowest Cl = 91 mmol/L in last 2 days, will monitor as appropriate      # Hypoalbuminemia: Lowest albumin = 2.8 g/dL at 1/13/2025  5:14 AM, will monitor as appropriate     # Hypertension: Noted on problem list  # Chronic heart failure with preserved ejection fraction: heart failure noted on problem list and last echo with EF >50%    # Acute Hypercapnic Respiratory Failure: based on venous blood gas results.  Continue supplemental oxygen and ventilatory support as indicated.        # DMII: A1C = 6.5 % (Ref range: <5.7 %) within past 6 months   # Obesity: Estimated body mass index is 33.17 kg/m  as calculated from the following:    " Height as of this encounter: 1.702 m (5' 7.01\").    Weight as of this encounter: 96.1 kg (211 lb 13.8 oz).      # Financial/Environmental Concerns: none                             Key Medications:     Current Facility-Administered Medications   Medication Dose Route Frequency Provider Last Rate Last Admin    - MEDICATION INSTRUCTIONS for Dialysis Patients -   Does not apply See Admin Instructions Braden Montana MD        amiodarone (PACERONE) tablet 200 mg  200 mg Oral Daily Cheo Farris MD   200 mg at 02/06/25 1031    apixaban ANTICOAGULANT (ELIQUIS) tablet 5 mg  5 mg Oral or Feeding Tube BID Denver Shipman MD   5 mg at 02/06/25 2026    atorvastatin (LIPITOR) tablet 20 mg  20 mg Oral or Feeding Tube QPM Denver Shipman MD   20 mg at 02/06/25 2026    B and C vitamin Complex with folic acid (NEPHRONEX) liquid 5 mL  5 mL Per Feeding Tube Daily Denver Shipman MD   5 mL at 02/06/25 0829    budesonide (PULMICORT) neb solution 1 mg  1 mg Nebulization BID Denver Shipman MD   1 mg at 02/07/25 0748    cetirizine (zyrTEC) tablet 5 mg  5 mg Oral or Feeding Tube At Bedtime Denver Shipman MD   5 mg at 02/06/25 2151    [Held by provider] diltiazem (CARDIZEM) tablet 60 mg  60 mg Per G Tube Q6H VÍCTOR Liset Romero MD   60 mg at 02/05/25 1526    furosemide (LASIX) tablet 80 mg  80 mg Oral or Feeding Tube Daily Enu-Vivian Samaniego MD        heparin lock flush 10 unit/mL injection 5-20 mL  5-20 mL Intracatheter Q24H Liset Romero MD   5 mL at 02/05/25 1042    insulin aspart (NovoLOG) injection (RAPID ACTING)  1-4 Units Subcutaneous Q4H Eren Mandel MD   1 Units at 02/07/25 0549    insulin glargine (LANTUS PEN) injection 15 Units  15 Units Subcutaneous BID Denver Shipman MD   15 Units at 02/06/25 2143    ipratropium - albuterol 0.5 mg/2.5 mg/3 mL (DUONEB) neb solution 3 mL  3 mL Nebulization 4x daily Denver Shipman MD   3 mL at 02/07/25 0748    miconazole (MICATIN) 2 % powder "   Topical BID Denver Shipman MD   Given at 02/06/25 2142    pantoprazole (PROTONIX) 2 mg/mL suspension 40 mg  40 mg Per Feeding Tube QAM Denver Mays MD   40 mg at 02/06/25 0829    Or    pantoprazole (PROTONIX) IV push injection 40 mg  40 mg Intravenous QAM Denver Mays MD   40 mg at 02/05/25 0630    polyethylene glycol (MIRALAX) Packet 17 g  17 g Oral or Feeding Tube Daily Liset Romero MD   17 g at 02/06/25 0828    Prosource TF20 ENfit Compatibl EN LIQD (PROSOURCE TF20) packet 60 mL  1 packet Per Feeding Tube BID Vivian Russell MD   60 mL at 02/06/25 2142    sennosides (SENOKOT) syrup 10 mL  10 mL Oral or Feeding Tube BID Liset Romero MD   10 mL at 02/06/25 2026    sertraline (ZOLOFT) tablet 75 mg  75 mg Oral or Feeding Tube QPM Denver Shipman MD   75 mg at 02/06/25 2026    [Held by provider] sevelamer carbonate (RENVELA) tablet 800 mg  800 mg Oral TID w/meals Denver Shipman MD   800 mg at 02/06/25 0828    sodium zirconium cyclosilicate (LOKELMA) packet 10 g  10 g Oral TID Celestina Ross NP   10 g at 02/07/25 0120    Followed by    [START ON 2/8/2025] sodium zirconium cyclosilicate (LOKELMA) packet 10 g  10 g Oral Daily Celestina Ross NP        tacrolimus (PROTOPIC) 0.1 % ointment   Topical BID Denver Shipman MD   Given at 02/06/25 2142    vitamin D3 (CHOLECALCIFEROL) tablet 50 mcg  50 mcg Oral or Feeding Tube Daily Denver Shipman MD   50 mcg at 02/06/25 0828     Current Facility-Administered Medications   Medication Dose Route Frequency Provider Last Rate Last Admin    dextrose 10% infusion   Intravenous Continuous PRN Celestina Ross NP        dextrose 10% infusion   Intravenous Continuous PRN Denver Shipman MD        No lozenges or gum should be given while patient on BIPAP/AVAPS/AVAPS AE   Does not apply Continuous PRN Denver Shipman MD        norepinephrine (LEVOPHED) 4 mg in  mL infusion PREMIX  0.01-0.6 mcg/kg/min Intravenous  Continuous Cheo Watt MD 14.7 mL/hr at 02/07/25 0121 0.04 mcg/kg/min at 02/07/25 0121    Patient may continue current oral medications   Does not apply Continuous PRN Denver Shipman MD        sodium chloride 0.9 % infusion   Intravenous Continuous Denver Shipman MD 10 mL/hr at 02/05/25 1831 10 mL/hr at 02/05/25 1831              Physical Examination:   Temp:  [97.4  F (36.3  C)-98.7  F (37.1  C)] 97.6  F (36.4  C)  Pulse:  [64-84] 75  BP: ()/() 148/43  FiO2 (%):  [30 %] 30 %  SpO2:  [89 %-100 %] 100 %    Intake/Output Summary (Last 24 hours) at 2/7/2025 0807  Last data filed at 2/7/2025 0600  Gross per 24 hour   Intake 1007.35 ml   Output 3200 ml   Net -2192.65 ml     Wt Readings from Last 4 Encounters:   02/07/25 96.1 kg (211 lb 13.8 oz)   06/14/24 101.1 kg (222 lb 14.2 oz)   06/03/24 101.1 kg (222 lb 14.2 oz)   04/24/24 101.1 kg (222 lb 14.2 oz)     BP - Mean:  [] 69  FiO2 (%): 30 %  Recent Labs   Lab 02/07/25  0546 02/06/25  1732 02/06/25  1020 02/06/25  0109 02/05/25 2004 02/05/25  1733   PH  --   --   --   --   --  7.42   PCO2  --   --   --   --   --  49*   PO2  --   --   --   --   --  78*   HCO3  --   --   --   --   --  32*   O2PER 60 0 0 30   < > 30    < > = values in this interval not displayed.       GEN: no acute distress   HEENT: head ncat, sclera anicteric,   PULM: O2 via NC. Crackles anteriorly, scattered wheezes.  CV/COR: RRR S1S2 no gallop,  No rub, no murmur. No lower extremity pitting edema  ABD: soft nontender, hypoactive bowel sounds, no mass  EXT:  warm and well perfused x4. L AKA.  NEURO: PERRL, no obvious deficits  SKIN: no obvious rash  LINES: Clean, dry intact. Nasoduodenal tube in place, R internal jugular catheter, L fistula.          Data:   All data and imaging reviewed     ROUTINE ICU LABS (Last four results)  CMP  Recent Labs   Lab 02/07/25  0548 02/07/25  0546 02/07/25  0409 02/07/25  0013 02/06/25  2048 02/06/25  0958 02/06/25  0836 02/06/25  0429  02/06/25  0416 02/05/25 2006 02/05/25 2004 02/05/25  1249 02/05/25  0631 02/04/25  0808 02/04/25  0624 02/03/25  0950 02/03/25  0453 02/02/25  0814 02/02/25  0542   NA  --  135  --   --   --   --  134*  --  133*  --  131*   < > 134*  --  135  --  136  --  138   POTASSIUM  --  4.3  --   --  4.0  --  5.7*  --  5.4*  5.4*   < > 6.1*   < > 4.9  --  6.4*  --  5.7*  --  4.9   CHLORIDE  --  96*  --   --   --   --  94*  --  94*  --  91*   < > 95*  --  98  --  99  --  99   CO2  --  29  --   --   --   --  29  --  28  --  29   < > 30*  --  27  --  29  --  30*   ANIONGAP  --  10  --   --   --   --  11  --  11  --  11   < > 9  --  10  --  8  --  9   * 150* 148* 139* 86   < > 77   < > 233*   < > 256*   < > 174*   < > 178*   < > 158*   < > 211*   BUN  --  49.8*  --   --   --   --  77.9*  --  76.6*  --  64.1*   < > 49.2*  --  77.8*  --  58.9*  --  36.2*   CR  --  2.79*  --   --   --   --  3.95*  --  3.88*  --  3.34*   < > 2.91*  --  4.44*  --  3.49*  --  2.38*   GFRESTIMATED  --  17*  --   --   --   --  11*  --  11*  --  14*   < > 16*  --  10*  --  13*  --  21*   JODY  --  10.0  --   --   --   --  9.9  --  9.8  --  10.2   < > 9.5  --  9.7  --  9.8  --  9.5   MAG  --  2.3  --   --   --   --   --   --  2.3  --   --   --  2.2  --  2.3  --  2.2  --  2.1   PHOS  --   --   --   --   --   --   --   --   --   --   --   --   --   --   --   --  2.6  --   --    PROTTOTAL  --   --   --   --   --   --   --   --   --   --  6.4  --   --   --   --   --   --   --  5.6*   ALBUMIN  --   --   --   --   --   --   --   --   --   --  3.5  --   --   --   --   --   --   --  3.2*   BILITOTAL  --   --   --   --   --   --   --   --   --   --  0.3  --   --   --   --   --   --   --  0.2   ALKPHOS  --   --   --   --   --   --   --   --   --   --  75  --   --   --   --   --   --   --  55   AST  --   --   --   --   --   --   --   --   --   --  17  --   --   --   --   --   --   --  12   ALT  --   --   --   --   --   --   --   --   --   --  24  --   --   --  "  --   --   --   --  22    < > = values in this interval not displayed.     CBC  Recent Labs   Lab 02/07/25  0546 02/06/25  1020 02/05/25  1736 02/02/25  0542 02/01/25  0553   WBC  --  9.2 6.5 5.6 6.4   RBC  --  2.40* 2.25* 2.41* 2.45*   HGB 7.4* 7.7* 7.2* 7.6* 7.8*   HCT  --  25.0* 23.5* 25.3* 25.4*   MCV  --  104* 104* 105* 104*   MCH  --  32.1 32.0 31.5 31.8   MCHC  --  30.8* 30.6* 30.0* 30.7*   RDW  --  16.7* 17.2* 17.8* 18.1*   PLT  --  199 161 170 175     INRNo lab results found in last 7 days.  Arterial Blood Gas  Recent Labs   Lab 02/07/25  0546 02/06/25  1732 02/06/25  1020 02/06/25  0109 02/05/25 2004 02/05/25  1733   PH  --   --   --   --   --  7.42   PCO2  --   --   --   --   --  49*   PO2  --   --   --   --   --  78*   HCO3  --   --   --   --   --  32*   O2PER 60 0 0 30   < > 30    < > = values in this interval not displayed.       All cultures:  No results for input(s): \"CULT\" in the last 168 hours.  Recent Results (from the past 24 hours)   XR Chest Port 1 View    Narrative    EXAM: XR CHEST PORT 1 VIEW  LOCATION: [facility]  DATE: 2/6/2025    INDICATION: Shortness of breath  COMPARISON: 2/5/2025.      Impression    IMPRESSION: Stable cardiac silhouette with mitral annulus calcification. Right IJ approach vascular catheter with distal tip terminating at the right atrium. Feeding tube courses below the diaphragm with distal tip beyond the inferior field of view.   Pulmonary vascular congestion with bilateral interstitial prominence appears worsened and may represent interstitial edema or atypical infection. Increasing dense consolidation of the left lung base which may represent atelectasis or pneumonia. Trace   right and small left pleural effusions are mildly increased. No discernible pneumothorax. Left axillary vascular graft.                                Gracie Gracia, MS4  [employer]    Time Spent on this Encounter   Supriya was seen and " evaluated by me on 02/07/25.  She was in critical condition as the result of hypoxic respiratory failure and CO2 narcosis/encephalopathy.    Her condition is now Serious.      I was present with the student who participated in the service and in the documentation of the note. I   have verified the history and personally performed the physical exam and medical decision-making. I   agree with the assessment and plan of care as documented in the note.      The acute issues managed by me today include acute on chronic hypoxic respiratory failure, hyperkalemia, anxiety, fluid overload, hypotension, and ESRD.   Supportive interventions provided and/or ordered by me include BIPAP, VBG monitoring, IV pressors, iHD, and hemodynamic monitoring    I spent 25 minutes updating daughter about her mom's clinical condition. All questions were answered to the best of my knowledge. She was concerned that her mom had fluid build up despite HD. Will add pre-HD midodrine to help facilitate fluid removal during iHD.    Total Critical Care time spent by me, excluding procedures, was 70 minutes.    Cheo Watt MD

## 2025-02-07 NOTE — PLAN OF CARE
Problem: Adult Inpatient Plan of Care  Goal: Plan of Care Review  Description: The Plan of Care Review/Shift note should be completed every shift.  The Outcome Evaluation is a brief statement about your assessment that the patient is improving, declining, or no change.  This information will be displayed automatically on your shift  note.  Outcome: Progressing  Flowsheets (Taken 2/6/2025 1843)  Outcome Evaluation: patient mentation much better today compared to overnights assessment. was able to come off BiPAP for a couple hours this morning, and at time of this writing, has been on 12L oxymask since 1630. pt required to be placed back on bipap this AM after a vbg resulted a critial PCO2 of 80. After about 5 hours on bipap and 3.2L taken off during dialysis, thefFollow up vbg taken in the evening showed the PCO2 had improved to 54 (vbg was taken 1 hr after being on 12L of oxymask). Pt still intermittently confused especially in the evening and night hours. Spoke with MD Mandel regarding this and he recommends staying away from benzos as this could make her become more lethargic and likely to retaine more CO2.  Plan of Care Reviewed With:   patient   child  Overall Patient Progress: improving  Goal: Absence of Hospital-Acquired Illness or Injury  Intervention: Identify and Manage Fall Risk  Recent Flowsheet Documentation  Taken 2/6/2025 1600 by Asael Steinberg, RN  Safety Promotion/Fall Prevention:   activity supervised   assistive device/personal items within reach   clutter free environment maintained   lighting adjusted   nonskid shoes/slippers when out of bed   room near nurse's station   room organization consistent  Taken 2/6/2025 1200 by Asael Steinberg, RN  Safety Promotion/Fall Prevention:   activity supervised   assistive device/personal items within reach   clutter free environment maintained   lighting adjusted   nonskid shoes/slippers when out of bed   room near nurse's station   room organization  consistent  Taken 2/6/2025 0800 by Asael Steinberg RN  Safety Promotion/Fall Prevention:   activity supervised   assistive device/personal items within reach   clutter free environment maintained   lighting adjusted   nonskid shoes/slippers when out of bed   room near nurse's station   room organization consistent  Intervention: Prevent Skin Injury  Recent Flowsheet Documentation  Taken 2/6/2025 1600 by Asael Steinberg RN  Body Position:   turned   heels elevated   upper extremity elevated  Skin Protection: adhesive use limited  Taken 2/6/2025 1200 by Asael Steinberg RN  Body Position:   turned   heels elevated   upper extremity elevated  Skin Protection: adhesive use limited  Taken 2/6/2025 0800 by Asael Steinberg RN  Body Position:   turned   heels elevated   upper extremity elevated  Skin Protection: adhesive use limited  Intervention: Prevent and Manage VTE (Venous Thromboembolism) Risk  Recent Flowsheet Documentation  Taken 2/6/2025 1600 by Asael Steinberg RN  VTE Prevention/Management: SCDs off (sequential compression devices)  Taken 2/6/2025 1200 by Asael Steinberg RN  VTE Prevention/Management: SCDs off (sequential compression devices)  Taken 2/6/2025 0800 by Asael Steinberg RN  VTE Prevention/Management: SCDs off (sequential compression devices)  Intervention: Prevent Infection  Recent Flowsheet Documentation  Taken 2/6/2025 1600 by Asael Steinberg RN  Infection Prevention:   rest/sleep promoted   hand hygiene promoted   single patient room provided  Taken 2/6/2025 1200 by Asael Steinberg RN  Infection Prevention:   rest/sleep promoted   hand hygiene promoted   single patient room provided  Taken 2/6/2025 0800 by Asael Steinberg RN  Infection Prevention:   rest/sleep promoted   hand hygiene promoted   single patient room provided  Goal: Optimal Comfort and Wellbeing  Intervention: Provide Person-Centered Care  Recent Flowsheet Documentation  Taken 2/6/2025 1600 by Asael Steinberg RN  Trust  Relationship/Rapport:   care explained   choices provided   reassurance provided   thoughts/feelings acknowledged  Taken 2/6/2025 1200 by Asael Steinberg RN  Trust Relationship/Rapport:   care explained   choices provided   reassurance provided   thoughts/feelings acknowledged  Taken 2/6/2025 0800 by Asael Steinberg RN  Trust Relationship/Rapport:   care explained   choices provided   reassurance provided   thoughts/feelings acknowledged     Problem: Adult Inpatient Plan of Care  Goal: Absence of Hospital-Acquired Illness or Injury  Intervention: Identify and Manage Fall Risk  Recent Flowsheet Documentation  Taken 2/6/2025 1600 by Asael Steinberg RN  Safety Promotion/Fall Prevention:   activity supervised   assistive device/personal items within reach   clutter free environment maintained   lighting adjusted   nonskid shoes/slippers when out of bed   room near nurse's station   room organization consistent  Taken 2/6/2025 1200 by Asael Steinberg RN  Safety Promotion/Fall Prevention:   activity supervised   assistive device/personal items within reach   clutter free environment maintained   lighting adjusted   nonskid shoes/slippers when out of bed   room near nurse's station   room organization consistent  Taken 2/6/2025 0800 by Asael Steinberg RN  Safety Promotion/Fall Prevention:   activity supervised   assistive device/personal items within reach   clutter free environment maintained   lighting adjusted   nonskid shoes/slippers when out of bed   room near nurse's station   room organization consistent  Intervention: Prevent Skin Injury  Recent Flowsheet Documentation  Taken 2/6/2025 1600 by Asael Steinberg RN  Body Position:   turned   heels elevated   upper extremity elevated  Skin Protection: adhesive use limited  Taken 2/6/2025 1200 by Asael Steinberg RN  Body Position:   turned   heels elevated   upper extremity elevated  Skin Protection: adhesive use limited  Taken 2/6/2025 0800 by Asael Steinberg  RN  Body Position:   turned   heels elevated   upper extremity elevated  Skin Protection: adhesive use limited  Intervention: Prevent and Manage VTE (Venous Thromboembolism) Risk  Recent Flowsheet Documentation  Taken 2/6/2025 1600 by Asael Steinberg RN  VTE Prevention/Management: SCDs off (sequential compression devices)  Taken 2/6/2025 1200 by Asael Steinberg RN  VTE Prevention/Management: SCDs off (sequential compression devices)  Taken 2/6/2025 0800 by Asael Steinberg RN  VTE Prevention/Management: SCDs off (sequential compression devices)  Intervention: Prevent Infection  Recent Flowsheet Documentation  Taken 2/6/2025 1600 by Asael Steinberg RN  Infection Prevention:   rest/sleep promoted   hand hygiene promoted   single patient room provided  Taken 2/6/2025 1200 by Asael Steinberg RN  Infection Prevention:   rest/sleep promoted   hand hygiene promoted   single patient room provided  Taken 2/6/2025 0800 by Asael Steinberg RN  Infection Prevention:   rest/sleep promoted   hand hygiene promoted   single patient room provided   Goal Outcome Evaluation:      Plan of Care Reviewed With: patient, child    Overall Patient Progress: improvingOverall Patient Progress: improving    Outcome Evaluation: patient mentation much better today compared to overnights assessment. was able to come off BiPAP for a couple hours this morning, and at time of this writing, has been on 12L oxymask since 1630. pt required to be placed back on bipap this AM after a vbg resulted a critial PCO2 of 80. After about 5 hours on bipap and 3.2L taken off during dialysis, thefFollow up vbg taken in the evening showed the PCO2 had improved to 54 (vbg was taken 1 hr after being on 12L of oxymask). Pt still intermittently confused especially in the evening and night hours. Spoke with MD Mandel regarding this and he recommends staying away from benzos as this could make her become more lethargic and likely to retaine more CO2.

## 2025-02-07 NOTE — PROGRESS NOTES
Potassium   Date Value Ref Range Status   02/07/2025 4.3 3.4 - 5.3 mmol/L Final   02/02/2022 4.7 3.4 - 5.3 mmol/L Final   04/17/2021 4.2 3.4 - 5.3 mmol/L Final     Hemoglobin   Date Value Ref Range Status   02/07/2025 7.4 (L) 11.7 - 15.7 g/dL Final   02/07/2025 7.4 (L) 11.7 - 15.7 g/dL Final   04/16/2021 10.9 (L) 11.7 - 15.7 g/dL Final     Creatinine   Date Value Ref Range Status   02/07/2025 2.79 (H) 0.51 - 0.95 mg/dL Final   04/17/2021 5.98 (H) 0.52 - 1.04 mg/dL Final     Urea Nitrogen   Date Value Ref Range Status   02/07/2025 49.8 (H) 8.0 - 23.0 mg/dL Final   11/24/2021 37 (H) 7 - 30 mg/dL Final   04/17/2021 68 (H) 7 - 30 mg/dL Final     Sodium   Date Value Ref Range Status   02/07/2025 135 135 - 145 mmol/L Final   04/17/2021 137 133 - 144 mmol/L Final     INR   Date Value Ref Range Status   04/24/2024 1.11 0.85 - 1.15 Final   04/16/2021 1.14 0.86 - 1.14 Final       DIALYSIS PROCEDURE NOTE  Hepatitis status of previous patient on machine log was checked and verified ok to use with this patients hepatitis status.  Patient has a 3 hour UF run with a net fluid removal of  3L.  A BFR of 300 ml/min was obtained via a Left AVF using 15 gauge needles.      The treatment plan was discussed with Dr. Madrid during the treatment.    Total heparin received during the treatment: 0 units.   Needle cannulation sites held x 10 min.       Meds  given: none   Complications: none      Person educated: patient. Knowledge base substantial. Barriers to learning: none. Educated on procedure via verbal mode. The patient/family verbalized understanding. Pt prefers verbal education style.     ICEBOAT? Timeout performed pre-treatment  I: Patient was identified using 2 identifiers  C:  Consent Signed Yes  E: Equipment preventative maintenance is current and dialysis delivery system OK to use  B:    Latest Reference Range & Units 01/09/25 01:51 02/06/25 10:20   Hep B Surface Agn Nonreactive   Nonreactive   Hepatitis B Surface Antibody  Instrument Value <8.5 m[IU]/mL <3.50    Hepatitis B Surface Antibody  Nonreactive      O: Dialysis orders present and complete prior to treatment  A: Vascular access verified and assessed prior to treatment  T: Treatment was performed at a clinically appropriate time  ?: Patient was allowed to ask questions and address concerns prior to treatment  See Adult Hemodialysis flowsheet in [facility] for further details and post assessment.  Machine water alarm in place and functioning. Transducer pods intact and checked every 15min.   .  Chlorine/Chloramine water system checked every 4 hours.    Patient repositioned every 2 hours during the treatment.  Post treatment report given to SHANTA Cifuentes regarding 3L of fluid removed, last BP of 130/61.     Please remove patient dressing on AVF and AVG needle sites 24 hours after dialysis. If leaking occurs please apply a Band-Aid.

## 2025-02-07 NOTE — PROGRESS NOTES
"SPIRITUAL HEALTH SERVICES  SPIRITUAL ASSESSMENT Progress Note  [facility]. Unit ICU     REFERRAL SOURCE: patient requesting support    Brief visit with Supriya at bedside as RN is providing cares. Supriya appears sleepy; she acknowledged this and requested \"prayer for now\" and welcomes ongoing  support.    Plan: I will inform the unit  of care provided. Unit  will continue to follow.    Portia Becerra MDiv [facility]  Staff   Please place consult order for routine Spiritual Health Services referrals.  [facility] available 24/7 for emergent requests either by having the on-call  paged or by entering an ASAP/STAT consult in Epic (this will also page the on-call ).    "

## 2025-02-07 NOTE — PLAN OF CARE
"Goal Outcome Evaluation:  0730-1900    Neuro: A&O to self/place & situation. Intermittent forgetful to date. Mentation waxes/weans, seems to be more confused in evening.     CV: Tele SR, Levo off @ 0930.     Resp: LS diminished/course, 2L O2 NC, intermittent BIPAP as needed. Was sleepier this morning; placed BIPAP back on. VBGs drawn-provider aware.     GI: BS active, no BM this shift. Diet: Combo diet pureed , nocturnal TF overnight. Was nauseous this morning; compazine given X1 with relief. Decreased appetite.     : Oliguria; bladder scanned X2 for 133, 165. Dialysis done today 3L off.      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 Miconazole powder applied to abd folds.     Pain: Denies pain    Activity: A2 Lift.     Plan: Dialysis run today/tomorrow. Possible transfer out of ICU tomorrow. Continue with plan of care.            Problem: Adult Inpatient Plan of Care  Goal: Plan of Care Review  Description: The Plan of Care Review/Shift note should be completed every shift.  The Outcome Evaluation is a brief statement about your assessment that the patient is improving, declining, or no change.  This information will be displayed automatically on your shift  note.  Outcome: Progressing  Goal: Patient-Specific Goal (Individualized)  Description: You can add care plan individualizations to a care plan. Examples of Individualization might be:  \"Parent requests to be called daily at 9am for status\", \"I have a hard time hearing out of my right ear\", or \"Do not touch me to wake me up as it startles  me\".  Outcome: Progressing  Goal: Absence of Hospital-Acquired Illness or Injury  Outcome: Progressing  Intervention: Identify and Manage Fall Risk  Recent Flowsheet Documentation  Taken 2/7/2025 1200 by Taylor Bhandari, RN  Safety Promotion/Fall Prevention: activity supervised  Taken 2/7/2025 0800 by Taylor Bhandari RN  Safety Promotion/Fall Prevention: bedside attendant  Intervention: Prevent Skin Injury  Recent " Flowsheet Documentation  Taken 2/7/2025 1200 by Taylor Bhandari RN  Body Position: (refused repositioning)   other (see comments)   weight shifting  Skin Protection: adhesive use limited  Taken 2/7/2025 1000 by Taylor Bhandari RN  Body Position:   legs elevated   upper extremity elevated   weight shifting  Taken 2/7/2025 0800 by Taylor Bhandari RN  Body Position:   turned   legs elevated   upper extremity elevated  Skin Protection: adhesive use limited  Intervention: Prevent and Manage VTE (Venous Thromboembolism) Risk  Recent Flowsheet Documentation  Taken 2/7/2025 1200 by Taylor Bhandari RN  VTE Prevention/Management: SCDs off (sequential compression devices)  Taken 2/7/2025 0800 by Taylor Bhandari RN  VTE Prevention/Management: SCDs off (sequential compression devices)  Intervention: Prevent Infection  Recent Flowsheet Documentation  Taken 2/7/2025 1200 by Taylor Bhandari RN  Infection Prevention:   rest/sleep promoted   hand hygiene promoted   single patient room provided  Taken 2/7/2025 0800 by Taylor Bhandari RN  Infection Prevention:   rest/sleep promoted   hand hygiene promoted   single patient room provided  Goal: Optimal Comfort and Wellbeing  Outcome: Progressing  Intervention: Provide Person-Centered Care  Recent Flowsheet Documentation  Taken 2/7/2025 1200 by Taylor Bhandari RN  Trust Relationship/Rapport:   care explained   choices provided   reassurance provided   thoughts/feelings acknowledged  Taken 2/7/2025 0800 by Taylor Bhandari RN  Trust Relationship/Rapport:   care explained   choices provided   reassurance provided   thoughts/feelings acknowledged  Goal: Readiness for Transition of Care  Outcome: Progressing     Problem: Fall Injury Risk  Goal: Absence of Fall and Fall-Related Injury  Outcome: Progressing  Intervention: Identify and Manage Contributors  Recent Flowsheet Documentation  Taken 2/7/2025 1200 by Taylor Bhandari  RN  Medication Review/Management:   medications reviewed   high-risk medications identified  Taken 2/7/2025 0800 by Taylor Bhandari RN  Medication Review/Management:   medications reviewed   high-risk medications identified  Intervention: Promote Injury-Free Environment  Recent Flowsheet Documentation  Taken 2/7/2025 1200 by Taylor Bhandari RN  Safety Promotion/Fall Prevention: activity supervised  Taken 2/7/2025 0800 by Taylor Bhandari RN  Safety Promotion/Fall Prevention: bedside attendant     Problem: Pain Acute  Goal: Optimal Pain Control and Function  Outcome: Progressing  Intervention: Optimize Psychosocial Wellbeing  Recent Flowsheet Documentation  Taken 2/7/2025 1200 by Taylor Bhandari RN  Supportive Measures:   active listening utilized   positive reinforcement provided   relaxation techniques promoted  Taken 2/7/2025 0800 by Taylor Bhandari RN  Supportive Measures:   active listening utilized   positive reinforcement provided   relaxation techniques promoted  Intervention: Prevent or Manage Pain  Recent Flowsheet Documentation  Taken 2/7/2025 1225 by Taylor Bhandari RN  Bowel Elimination Promotion: (bowel regimen followed) --  Taken 2/7/2025 1200 by Taylor Bhandari RN  Sensory Stimulation Regulation:   care clustered   lighting decreased  Bowel Elimination Promotion: (bowel regimen followed) other (see comments)  Complementary Therapy:   music therapy provided   essential oils utilized   aromatherapy utilized  Medication Review/Management:   medications reviewed   high-risk medications identified  Taken 2/7/2025 0800 by Taylor Bhandari RN  Sensory Stimulation Regulation:   care clustered   lighting decreased  Bowel Elimination Promotion: (bowel regimen followed) other (see comments)  Complementary Therapy:   music therapy provided   essential oils utilized   aromatherapy utilized  Medication Review/Management:   medications reviewed   high-risk medications  identified     Problem: Gas Exchange Impaired  Goal: Optimal Gas Exchange  Outcome: Progressing  Intervention: Optimize Oxygenation and Ventilation  Recent Flowsheet Documentation  Taken 2/7/2025 1200 by Taylor Bhandari RN  Airway/Ventilation Management: calming measures promoted  Head of Bed (HOB) Positioning: HOB at 45 degrees  Taken 2/7/2025 1000 by Taylor Bhandari RN  Head of Bed (HOB) Positioning: HOB at 45 degrees  Taken 2/7/2025 0800 by Taylor Bhandari RN  Airway/Ventilation Management: calming measures promoted  Head of Bed (HOB) Positioning: HOB at 45 degrees     Problem: Risk for Delirium  Goal: Optimal Coping  Outcome: Progressing  Intervention: Optimize Psychosocial Adjustment to Delirium  Recent Flowsheet Documentation  Taken 2/7/2025 1200 by Taylor Bhandari RN  Supportive Measures:   active listening utilized   positive reinforcement provided   relaxation techniques promoted  Taken 2/7/2025 0800 by Taylor Bhandari RN  Supportive Measures:   active listening utilized   positive reinforcement provided   relaxation techniques promoted  Goal: Improved Behavioral Control  Outcome: Progressing  Intervention: Prevent and Manage Agitation  Recent Flowsheet Documentation  Taken 2/7/2025 1200 by Taylor Bhandari RN  Complementary Therapy:   music therapy provided   essential oils utilized   aromatherapy utilized  Environment Familiarity/Consistency: daily routine followed  Taken 2/7/2025 0800 by Taylor Bhandari RN  Complementary Therapy:   music therapy provided   essential oils utilized   aromatherapy utilized  Environment Familiarity/Consistency: daily routine followed  Intervention: Minimize Safety Risk  Recent Flowsheet Documentation  Taken 2/7/2025 1200 by Taylor Bhandari RN  Enhanced Safety Measures:   room near unit station   pain management  Trust Relationship/Rapport:   care explained   choices provided   reassurance provided   thoughts/feelings  acknowledged  Taken 2/7/2025 0800 by Taylor Bhandari RN  Enhanced Safety Measures:   room near unit station   pain management  Trust Relationship/Rapport:   care explained   choices provided   reassurance provided   thoughts/feelings acknowledged  Goal: Improved Attention and Thought Clarity  Outcome: Progressing  Intervention: Maximize Cognitive Function  Recent Flowsheet Documentation  Taken 2/7/2025 1200 by Taylor Bhandari RN  Sensory Stimulation Regulation:   care clustered   lighting decreased  Reorientation Measures:   calendar in view   clock in view  Taken 2/7/2025 0800 by Taylor Bhandari RN  Sensory Stimulation Regulation:   care clustered   lighting decreased  Reorientation Measures:   calendar in view   clock in view  Goal: Improved Sleep  Outcome: Progressing     Problem: Comorbidity Management  Goal: Maintenance of COPD Symptom Control  Outcome: Progressing  Intervention: Maintain COPD Symptom Control  Recent Flowsheet Documentation  Taken 2/7/2025 1200 by Taylor Bhandari RN  Medication Review/Management:   medications reviewed   high-risk medications identified  Taken 2/7/2025 0800 by Taylor Bhandari RN  Medication Review/Management:   medications reviewed   high-risk medications identified  Goal: Blood Glucose Levels Within Targeted Range  Outcome: Progressing  Intervention: Monitor and Manage Glycemia  Recent Flowsheet Documentation  Taken 2/7/2025 1200 by Taylor Bhandari RN  Medication Review/Management:   medications reviewed   high-risk medications identified  Taken 2/7/2025 0800 by Taylor Bhandari RN  Medication Review/Management:   medications reviewed   high-risk medications identified  Goal: Blood Pressure in Desired Range  Outcome: Progressing  Intervention: Maintain Blood Pressure Management  Recent Flowsheet Documentation  Taken 2/7/2025 1200 by Taylor Bhandari RN  Medication Review/Management:   medications reviewed   high-risk medications  identified  Taken 2/7/2025 0800 by Taylor Bhandari, RN  Medication Review/Management:   medications reviewed   high-risk medications identified

## 2025-02-08 ENCOUNTER — APPOINTMENT (OUTPATIENT)
Dept: GENERAL RADIOLOGY | Facility: CLINIC | Age: 75
DRG: 207 | End: 2025-02-08
Attending: INTERNAL MEDICINE
Payer: COMMERCIAL

## 2025-02-08 LAB
ALBUMIN UR-MCNC: 70 MG/DL
AMORPH CRY #/AREA URNS HPF: ABNORMAL /HPF
ANION GAP SERPL CALCULATED.3IONS-SCNC: 10 MMOL/L (ref 7–15)
APPEARANCE UR: ABNORMAL
BILIRUB UR QL STRIP: NEGATIVE
BUN SERPL-MCNC: 77.9 MG/DL (ref 8–23)
CALCIUM SERPL-MCNC: 9.8 MG/DL (ref 8.8–10.4)
CHLORIDE SERPL-SCNC: 97 MMOL/L (ref 98–107)
COLOR UR AUTO: ABNORMAL
CREAT SERPL-MCNC: 3.96 MG/DL (ref 0.51–0.95)
EGFRCR SERPLBLD CKD-EPI 2021: 11 ML/MIN/1.73M2
ERYTHROCYTE [DISTWIDTH] IN BLOOD BY AUTOMATED COUNT: 16.7 % (ref 10–15)
GLUCOSE BLDC GLUCOMTR-MCNC: 134 MG/DL (ref 70–99)
GLUCOSE BLDC GLUCOMTR-MCNC: 149 MG/DL (ref 70–99)
GLUCOSE BLDC GLUCOMTR-MCNC: 155 MG/DL (ref 70–99)
GLUCOSE BLDC GLUCOMTR-MCNC: 159 MG/DL (ref 70–99)
GLUCOSE BLDC GLUCOMTR-MCNC: 164 MG/DL (ref 70–99)
GLUCOSE BLDC GLUCOMTR-MCNC: 171 MG/DL (ref 70–99)
GLUCOSE BLDC GLUCOMTR-MCNC: 179 MG/DL (ref 70–99)
GLUCOSE SERPL-MCNC: 204 MG/DL (ref 70–99)
GLUCOSE UR STRIP-MCNC: 150 MG/DL
HCO3 SERPL-SCNC: 29 MMOL/L (ref 22–29)
HCT VFR BLD AUTO: 23.7 % (ref 35–47)
HGB BLD-MCNC: 7.4 G/DL (ref 11.7–15.7)
HGB UR QL STRIP: ABNORMAL
KETONES UR STRIP-MCNC: NEGATIVE MG/DL
LEUKOCYTE ESTERASE UR QL STRIP: ABNORMAL
MAGNESIUM SERPL-MCNC: 2.5 MG/DL (ref 1.7–2.3)
MCH RBC QN AUTO: 32.2 PG (ref 26.5–33)
MCHC RBC AUTO-ENTMCNC: 31.2 G/DL (ref 31.5–36.5)
MCV RBC AUTO: 103 FL (ref 78–100)
NITRATE UR QL: NEGATIVE
PH UR STRIP: 8 [PH] (ref 5–7)
PLATELET # BLD AUTO: 173 10E3/UL (ref 150–450)
POTASSIUM SERPL-SCNC: 4.9 MMOL/L (ref 3.4–5.3)
RBC # BLD AUTO: 2.3 10E6/UL (ref 3.8–5.2)
RBC URINE: 26 /HPF
SODIUM SERPL-SCNC: 136 MMOL/L (ref 135–145)
SP GR UR STRIP: 1.01 (ref 1–1.03)
SQUAMOUS EPITHELIAL: 4 /HPF
TROPONIN T SERPL HS-MCNC: 89 NG/L
UROBILINOGEN UR STRIP-MCNC: NORMAL MG/DL
WBC # BLD AUTO: 5.5 10E3/UL (ref 4–11)
WBC URINE: 17 /HPF

## 2025-02-08 PROCEDURE — 84484 ASSAY OF TROPONIN QUANT: CPT | Performed by: INTERNAL MEDICINE

## 2025-02-08 PROCEDURE — 93010 ELECTROCARDIOGRAM REPORT: CPT | Performed by: INTERNAL MEDICINE

## 2025-02-08 PROCEDURE — 71045 X-RAY EXAM CHEST 1 VIEW: CPT

## 2025-02-08 PROCEDURE — 250N000013 HC RX MED GY IP 250 OP 250 PS 637: Performed by: INTERNAL MEDICINE

## 2025-02-08 PROCEDURE — 99291 CRITICAL CARE FIRST HOUR: CPT | Performed by: INTERNAL MEDICINE

## 2025-02-08 PROCEDURE — 94640 AIRWAY INHALATION TREATMENT: CPT | Mod: 76

## 2025-02-08 PROCEDURE — 90937 HEMODIALYSIS REPEATED EVAL: CPT

## 2025-02-08 PROCEDURE — 99207 PR NO BILLABLE SERVICE THIS VISIT: CPT | Performed by: HOSPITALIST

## 2025-02-08 PROCEDURE — 83735 ASSAY OF MAGNESIUM: CPT | Performed by: HOSPITALIST

## 2025-02-08 PROCEDURE — 93005 ELECTROCARDIOGRAM TRACING: CPT

## 2025-02-08 PROCEDURE — 250N000009 HC RX 250: Performed by: HOSPITALIST

## 2025-02-08 PROCEDURE — 87086 URINE CULTURE/COLONY COUNT: CPT | Performed by: NURSE PRACTITIONER

## 2025-02-08 PROCEDURE — 80048 BASIC METABOLIC PNL TOTAL CA: CPT | Performed by: HOSPITALIST

## 2025-02-08 PROCEDURE — 94640 AIRWAY INHALATION TREATMENT: CPT

## 2025-02-08 PROCEDURE — 258N000003 HC RX IP 258 OP 636: Performed by: INTERNAL MEDICINE

## 2025-02-08 PROCEDURE — 94660 CPAP INITIATION&MGMT: CPT

## 2025-02-08 PROCEDURE — 250N000013 HC RX MED GY IP 250 OP 250 PS 637: Performed by: HOSPITALIST

## 2025-02-08 PROCEDURE — 634N000001 HC RX 634: Mod: JZ | Performed by: INTERNAL MEDICINE

## 2025-02-08 PROCEDURE — 81001 URINALYSIS AUTO W/SCOPE: CPT | Performed by: NURSE PRACTITIONER

## 2025-02-08 PROCEDURE — 120N000013 HC R&B IMCU

## 2025-02-08 PROCEDURE — 85014 HEMATOCRIT: CPT | Performed by: INTERNAL MEDICINE

## 2025-02-08 PROCEDURE — 999N000157 HC STATISTIC RCP TIME EA 10 MIN

## 2025-02-08 PROCEDURE — 90935 HEMODIALYSIS ONE EVALUATION: CPT | Performed by: INTERNAL MEDICINE

## 2025-02-08 RX ORDER — MIDODRINE HYDROCHLORIDE 5 MG/1
5 TABLET ORAL
Status: DISCONTINUED | OUTPATIENT
Start: 2025-02-10 | End: 2025-02-26 | Stop reason: HOSPADM

## 2025-02-08 RX ADMIN — PANTOPRAZOLE SODIUM 40 MG: 40 INJECTION, POWDER, FOR SOLUTION INTRAVENOUS at 07:49

## 2025-02-08 RX ADMIN — INSULIN ASPART 1 UNITS: 100 INJECTION, SOLUTION INTRAVENOUS; SUBCUTANEOUS at 00:48

## 2025-02-08 RX ADMIN — Medication: at 08:35

## 2025-02-08 RX ADMIN — TACROLIMUS: 1 OINTMENT TOPICAL at 09:10

## 2025-02-08 RX ADMIN — Medication 1 MG: at 21:11

## 2025-02-08 RX ADMIN — AMIODARONE HYDROCHLORIDE 200 MG: 200 TABLET ORAL at 08:53

## 2025-02-08 RX ADMIN — FUROSEMIDE 80 MG: 40 TABLET ORAL at 08:53

## 2025-02-08 RX ADMIN — IPRATROPIUM BROMIDE AND ALBUTEROL SULFATE 3 ML: .5; 3 SOLUTION RESPIRATORY (INHALATION) at 15:22

## 2025-02-08 RX ADMIN — Medication 60 ML: at 21:11

## 2025-02-08 RX ADMIN — BUDESONIDE 1 MG: 1 INHALANT ORAL at 08:16

## 2025-02-08 RX ADMIN — IPRATROPIUM BROMIDE AND ALBUTEROL SULFATE 3 ML: .5; 3 SOLUTION RESPIRATORY (INHALATION) at 21:15

## 2025-02-08 RX ADMIN — TACROLIMUS: 1 OINTMENT TOPICAL at 20:41

## 2025-02-08 RX ADMIN — BUDESONIDE 1 MG: 1 INHALANT ORAL at 21:15

## 2025-02-08 RX ADMIN — EPOETIN ALFA-EPBX 10000 UNITS: 10000 INJECTION, SOLUTION INTRAVENOUS; SUBCUTANEOUS at 09:42

## 2025-02-08 RX ADMIN — Medication 5 ML: at 08:55

## 2025-02-08 RX ADMIN — QUETIAPINE FUMARATE 50 MG: 50 TABLET ORAL at 21:11

## 2025-02-08 RX ADMIN — SODIUM CHLORIDE 200 ML: 9 INJECTION, SOLUTION INTRAVENOUS at 08:34

## 2025-02-08 RX ADMIN — IPRATROPIUM BROMIDE AND ALBUTEROL SULFATE 3 ML: .5; 3 SOLUTION RESPIRATORY (INHALATION) at 08:16

## 2025-02-08 RX ADMIN — APIXABAN 5 MG: 5 TABLET, FILM COATED ORAL at 21:11

## 2025-02-08 RX ADMIN — ACETAMINOPHEN 650 MG: 325 TABLET, FILM COATED ORAL at 18:27

## 2025-02-08 RX ADMIN — ATORVASTATIN CALCIUM 20 MG: 20 TABLET, FILM COATED ORAL at 19:56

## 2025-02-08 RX ADMIN — CETIRIZINE HYDROCHLORIDE 5 MG: 5 TABLET ORAL at 21:11

## 2025-02-08 RX ADMIN — INSULIN GLARGINE 15 UNITS: 100 INJECTION, SOLUTION SUBCUTANEOUS at 20:36

## 2025-02-08 RX ADMIN — MICONAZOLE NITRATE: 2 POWDER TOPICAL at 07:55

## 2025-02-08 RX ADMIN — MICONAZOLE NITRATE: 2 POWDER TOPICAL at 20:40

## 2025-02-08 RX ADMIN — MIDODRINE HYDROCHLORIDE 5 MG: 5 TABLET ORAL at 07:35

## 2025-02-08 RX ADMIN — IPRATROPIUM BROMIDE AND ALBUTEROL SULFATE 3 ML: .5; 3 SOLUTION RESPIRATORY (INHALATION) at 11:32

## 2025-02-08 RX ADMIN — SERTRALINE HYDROCHLORIDE 75 MG: 50 TABLET ORAL at 19:56

## 2025-02-08 RX ADMIN — Medication 60 ML: at 12:05

## 2025-02-08 RX ADMIN — SODIUM CHLORIDE 250 ML: 9 INJECTION, SOLUTION INTRAVENOUS at 08:34

## 2025-02-08 RX ADMIN — INSULIN ASPART 1 UNITS: 100 INJECTION, SOLUTION INTRAVENOUS; SUBCUTANEOUS at 07:56

## 2025-02-08 RX ADMIN — INSULIN GLARGINE 15 UNITS: 100 INJECTION, SOLUTION SUBCUTANEOUS at 07:57

## 2025-02-08 RX ADMIN — APIXABAN 5 MG: 5 TABLET, FILM COATED ORAL at 08:53

## 2025-02-08 RX ADMIN — Medication 50 MCG: at 08:53

## 2025-02-08 RX ADMIN — SENNOSIDES 10 ML: 8.8 LIQUID ORAL at 08:53

## 2025-02-08 RX ADMIN — INSULIN ASPART 1 UNITS: 100 INJECTION, SOLUTION INTRAVENOUS; SUBCUTANEOUS at 17:12

## 2025-02-08 RX ADMIN — INSULIN ASPART 1 UNITS: 100 INJECTION, SOLUTION INTRAVENOUS; SUBCUTANEOUS at 04:54

## 2025-02-08 RX ADMIN — INSULIN ASPART 1 UNITS: 100 INJECTION, SOLUTION INTRAVENOUS; SUBCUTANEOUS at 19:34

## 2025-02-08 ASSESSMENT — ACTIVITIES OF DAILY LIVING (ADL)
ADLS_ACUITY_SCORE: 84
ADLS_ACUITY_SCORE: 80
ADLS_ACUITY_SCORE: 84
ADLS_ACUITY_SCORE: 80
ADLS_ACUITY_SCORE: 84
ADLS_ACUITY_SCORE: 88
ADLS_ACUITY_SCORE: 80
ADLS_ACUITY_SCORE: 84
ADLS_ACUITY_SCORE: 80
ADLS_ACUITY_SCORE: 88
ADLS_ACUITY_SCORE: 80
ADLS_ACUITY_SCORE: 88
ADLS_ACUITY_SCORE: 80
ADLS_ACUITY_SCORE: 84
ADLS_ACUITY_SCORE: 80
ADLS_ACUITY_SCORE: 84
ADLS_ACUITY_SCORE: 84
ADLS_ACUITY_SCORE: 80
ADLS_ACUITY_SCORE: 88

## 2025-02-08 NOTE — PROGRESS NOTES
DIALYSIS PROCEDURE NOTE      Patient dialyzed for 3 hrs. on a K2 bath with a net fluid removal of  2L.  A BFR of 450 ml/min was obtained via a AVF using 15 gauge needles.      The treatment plan was discussed with Dr. Madrid during the treatment.    Total heparin received during the treatment: 0 units.   Needle cannulation sites held x 10 min.     Meds  given: epo 10,000  Complications: Asymptomatic hypotension in the 80s. Patient did run yesterday. Turned off Uf for the remaninig 37 minutes left on run and gave back 100 ml of NS. Floor nurse and MD made aware.      Person educated: patient. Barriers to learning: intermittent confusion . Educated on procedure via verbal mode. Patient verbalized understanding.     ICEBOAT    I: Patient was identified using 2 identifiers  C:  Consent Signed Yes  E: Equipment preventative maintenance is current and dialysis delivery system OK to use  B: Hepatitis B Surface result    Latest Reference Range & Units 01/09/25 01:51 02/06/25 10:20   Hep B Surface Agn Nonreactive   Nonreactive   Hepatitis B Surface Antibody Instrument Value <8.5 m[IU]/mL <3.50      O: Dialysis orders present and complete prior to treatment  A: Vascular access verified and assessed prior to treatment  T: Treatment was performed at a clinically appropriate time  ?: Patient was allowed to ask questions and address concerns prior to treatment  Machine water alarm in place and functioning. Transducer pods intact and checked every 15min.   Pt returned via bedside.  Chlorine/Chloramine water system checked every 4 hours.    Patient repositioned every 2 hours during the treatment.  Post treatment report given     Please remove patient dressing on AVF and AVG needle sites 24 hours after dialysis. If leaking occurs please apply a Band-Aid.

## 2025-02-08 NOTE — PROGRESS NOTES
Renal Medicine Progress Note            Assessment/Plan:     75 y.o woman with ESRD, admitted for respiratory distress due to influenza A infection.      # ESRD:               -TTS               -RAVF               -Dr. Basilio               -[facility]                 # Influenza A:               -finished Tamiflu     # FEN: hypekalemia.  -Recurrent hyperkalemia despite regular dialysis.  Likely partly driven by respiratory acidosis and cellular shift  -Better today.  2K bath with dialysis     # Anemia: Hgb is below target                -epo with HD    # Volume overload , acute hypoxic/hypercarbic respiratory failure-chest x-ray with pulm edema  -More than 11 L removed over last 5 days.  Significantly better.     Plan:  -As of today.  2-3 L ultrafiltration.  Next dialysis on Monday.  -Noted midodrine started by ICU team.  Okay with it.  # Noted patient moved to Monday Wednesday Friday schedule at her outpatient [facility].  Switch to Monday Wednesday Friday next week.    Critically ill patient with high risk of decompensation.            Interval History:     Seen/examined on dialysis.  Dialysis going okay.  She remains on BiPAP.   2-3 L ultrafiltration planned today.  UF only 1 yesterday with 3 L removed.  More than 9 L removed over last 4 days.           Medications and Allergies:     Current Facility-Administered Medications   Medication Dose Route Frequency Provider Last Rate Last Admin    - MEDICATION INSTRUCTIONS for Dialysis Patients -   Does not apply See Admin Instructions Braden Montana MD        amiodarone (PACERONE) tablet 200 mg  200 mg Oral Daily Cheo Farris MD   200 mg at 02/08/25 0853    apixaban ANTICOAGULANT (ELIQUIS) tablet 5 mg  5 mg Oral or Feeding Tube BID Denver Shipman MD   5 mg at 02/08/25 0853    atorvastatin (LIPITOR) tablet 20 mg  20 mg Oral or Feeding Tube QPM Denver Shipman MD   20 mg at 02/07/25 2006    B and C vitamin Complex with folic acid (NEPHRONEX)  liquid 5 mL  5 mL Per Feeding Tube Daily Denver Shipman MD   5 mL at 02/08/25 0855    budesonide (PULMICORT) neb solution 1 mg  1 mg Nebulization BID Denver Shipman MD   1 mg at 02/08/25 0816    cetirizine (zyrTEC) tablet 5 mg  5 mg Oral or Feeding Tube At Bedtime Denver Shipman MD   5 mg at 02/07/25 2215    [Held by provider] diltiazem (CARDIZEM) tablet 60 mg  60 mg Per G Tube Q6H [facility] Liset Romero MD   60 mg at 02/05/25 1526    furosemide (LASIX) tablet 80 mg  80 mg Oral or Feeding Tube Daily Enu-Vivian Samaniego MD   80 mg at 02/08/25 0853    heparin lock flush 10 unit/mL injection 5-20 mL  5-20 mL Intracatheter Q24H Liset Romero MD   5 mL at 02/07/25 1208    insulin aspart (NovoLOG) injection (RAPID ACTING)  1-4 Units Subcutaneous Q4H Eren Mandel MD   1 Units at 02/08/25 0756    insulin glargine (LANTUS PEN) injection 15 Units  15 Units Subcutaneous BID Denver Shipman MD   15 Units at 02/08/25 0757    ipratropium - albuterol 0.5 mg/2.5 mg/3 mL (DUONEB) neb solution 3 mL  3 mL Nebulization 4x daily Denver Shipman MD   3 mL at 02/08/25 1132    miconazole (MICATIN) 2 % powder   Topical BID Denver Shipman MD   Given at 02/08/25 0755    midodrine (PROAMATINE) tablet 5 mg  5 mg Oral Once per day on Tuesday Thursday Saturday Cheo Watt MD   5 mg at 02/08/25 0735    pantoprazole (PROTONIX) 2 mg/mL suspension 40 mg  40 mg Per Feeding Tube QAM Denver Mays MD   40 mg at 02/06/25 0829    Or    pantoprazole (PROTONIX) IV push injection 40 mg  40 mg Intravenous QAM Denver Mays MD   40 mg at 02/08/25 0749    polyethylene glycol (MIRALAX) Packet 17 g  17 g Oral or Feeding Tube Daily Liset Romero MD   17 g at 02/06/25 0828    Prosource TF20 ENfit Compatibl EN LIQD (PROSOURCE TF20) packet 60 mL  1 packet Per Feeding Tube BID Vivian Russell MD   60 mL at 02/08/25 1205    QUEtiapine (SEROquel) tablet 50 mg  50 mg Oral or Feeding Tube At Bedtime Shukri  "MD Cheo   50 mg at 02/07/25 2215    sennosides (SENOKOT) syrup 10 mL  10 mL Oral or Feeding Tube BID Liset Romero MD   10 mL at 02/08/25 0853    sertraline (ZOLOFT) tablet 75 mg  75 mg Oral or Feeding Tube QPM Denver Shipman MD   75 mg at 02/07/25 2006    [Held by provider] sevelamer carbonate (RENVELA) tablet 800 mg  800 mg Oral TID w/meals Denver Shipman MD   800 mg at 02/06/25 0828    sodium zirconium cyclosilicate (LOKELMA) packet 10 g  10 g Oral Daily Celestina Ross, NP        tacrolimus (PROTOPIC) 0.1 % ointment   Topical BID Denver Shipman MD   Given at 02/08/25 0910    vitamin D3 (CHOLECALCIFEROL) tablet 50 mcg  50 mcg Oral or Feeding Tube Daily Denver Shipman MD   50 mcg at 02/08/25 0853        Allergies   Allergen Reactions    Ampicillin-Sulbactam Sodium Rash     No evidence SJS, but very uncomfortable and precipitated multiple provider visits. Would not use penicillins again if other options available.     Penicillins Rash            Physical Exam:   Vitals were reviewed   , Blood pressure 117/44, pulse 69, temperature 97.1  F (36.2  C), temperature source Axillary, resp. rate 20, height 1.702 m (5' 7.01\"), weight 96.1 kg (211 lb 13.8 oz), SpO2 99%, not currently breastfeeding.    Wt Readings from Last 3 Encounters:   02/07/25 96.1 kg (211 lb 13.8 oz)   06/14/24 101.1 kg (222 lb 14.2 oz)   06/03/24 101.1 kg (222 lb 14.2 oz)       GENERAL APPEARANCE: Restless  RESP: Diminished at bases.  Few crackles  CV: irregularly, irregular.  ABDOMEN: obese, soft. NT  EXTREMITIES/SKIN: + Edema         Data:     CBC RESULTS:     Recent Labs   Lab 02/08/25  0801 02/07/25  0546 02/06/25  1020 02/05/25  1736 02/02/25  0542   WBC 5.5 6.2 9.2 6.5 5.6   RBC 2.30* 2.33* 2.40* 2.25* 2.41*   HGB 7.4* 7.4*  7.4* 7.7* 7.2* 7.6*   HCT 23.7* 24.2* 25.0* 23.5* 25.3*    210 199 161 170       Basic Metabolic Panel:  Recent Labs   Lab 02/08/25  1209 02/08/25  0756 02/08/25  0453 02/08/25  0452 " 02/08/25  0006 02/07/25  1950 02/07/25  0548 02/07/25  0546 02/07/25  0013 02/06/25  2048 02/06/25  0958 02/06/25  0836 02/06/25  0429 02/06/25  0416 02/06/25  0115 02/06/25  0109 02/05/25 2006 02/05/25 2004 02/05/25  1836 02/05/25  1736   NA  --   --  136  --   --   --   --  135  --   --   --  134*  --  133*  --   --   --  131*  --  132*   POTASSIUM  --   --  4.9  --   --   --   --  4.3  --  4.0  --  5.7*  --  5.4*  5.4*  --  5.9*  --  6.1*  --  5.9*   CHLORIDE  --   --  97*  --   --   --   --  96*  --   --   --  94*  --  94*  --   --   --  91*  --  92*   CO2  --   --  29  --   --   --   --  29  --   --   --  29  --  28  --   --   --  29  --  29   BUN  --   --  77.9*  --   --   --   --  49.8*  --   --   --  77.9*  --  76.6*  --   --   --  64.1*  --  63.0*   CR  --   --  3.96*  --   --   --   --  2.79*  --   --   --  3.95*  --  3.88*  --   --   --  3.34*  --  3.38*   * 171* 204* 179* 164* 158*   < > 150*   < > 86   < > 77   < > 233*   < >  --    < > 256*   < > 165*   JODY  --   --  9.8  --   --   --   --  10.0  --   --   --  9.9  --  9.8  --   --   --  10.2  --  9.8    < > = values in this interval not displayed.       INRNo lab results found in last 7 days.   Attestation:   I have reviewed today's relevant vital signs, notes, medications, labs and imaging.    Davide Madrid MD  [facility] - Nephrology   [phone]

## 2025-02-08 NOTE — PLAN OF CARE
"Goal Outcome Evaluation:      Plan of Care Reviewed With: patient    Overall Patient Progress: improvingOverall Patient Progress: improving    Outcome Evaluation: Patient alert overnight with occasional confusion. Bedside attendant at bedside. Bipap on and off over the shift, switched with NC oxygen at 3L. Other VS WNL    Orientation: Aox3, disoriented to time. Intermittent restlessness and confusion.    Vitals/Tele: NSR. On Bipap with FiO2 of 30%, alternating with 3L NC as patient could not keep the Bipap continuously when awake    IV Access/drains: CVC 3 lumen to R internal jugular and Left upper arm HD Fistula    Diet: TF 4 pm to 6 am, Pureed diet during the day    Mobility: Lift    GI/: No BM overnight, Bowel regimen. Oliguric. Bladder scan 190 mls    Wound/Skin: Redness to abd folds and coccyx      Discharge Plan: TBD      See Flow sheets for assessment     Problem: Adult Inpatient Plan of Care  Goal: Plan of Care Review  Description: The Plan of Care Review/Shift note should be completed every shift.  The Outcome Evaluation is a brief statement about your assessment that the patient is improving, declining, or no change.  This information will be displayed automatically on your shift  note.  Outcome: Progressing  Flowsheets (Taken 2/8/2025 0623)  Outcome Evaluation: Patient alert overnight with occasional confusion. Bedside attendant at bedside. Bipap on and off over the shift, switched with NC oxygen at 3L. Other VS WNL  Plan of Care Reviewed With: patient  Overall Patient Progress: improving  Goal: Patient-Specific Goal (Individualized)  Description: You can add care plan individualizations to a care plan. Examples of Individualization might be:  \"Parent requests to be called daily at 9am for status\", \"I have a hard time hearing out of my right ear\", or \"Do not touch me to wake me up as it startles  me\".  Outcome: Progressing  Goal: Absence of Hospital-Acquired Illness or Injury  Outcome: " Progressing  Intervention: Identify and Manage Fall Risk  Recent Flowsheet Documentation  Taken 2/8/2025 0400 by Mauricio Davison RN  Safety Promotion/Fall Prevention: activity supervised  Taken 2/8/2025 0000 by Mauricio Davison RN  Safety Promotion/Fall Prevention: activity supervised  Taken 2/7/2025 2000 by Mauricio Davison RN  Safety Promotion/Fall Prevention: activity supervised  Intervention: Prevent Skin Injury  Recent Flowsheet Documentation  Taken 2/8/2025 0400 by Mauricio Davison RN  Body Position:   turned   side-lying   heels elevated  Skin Protection: adhesive use limited  Taken 2/8/2025 0200 by Mauricio Davison RN  Body Position:   turned   side-lying   heels elevated  Taken 2/8/2025 0000 by Maruicio Davison RN  Body Position:   turned   side-lying   heels elevated  Skin Protection: adhesive use limited  Taken 2/7/2025 2200 by Mauricio Davison RN  Body Position:   turned   side-lying   heels elevated  Taken 2/7/2025 2000 by Mauricio Davison RN  Body Position:   turned   side-lying   heels elevated  Skin Protection: adhesive use limited  Intervention: Prevent and Manage VTE (Venous Thromboembolism) Risk  Recent Flowsheet Documentation  Taken 2/8/2025 0400 by Mauricio Davison RN  VTE Prevention/Management: SCDs off (sequential compression devices)  Taken 2/8/2025 0000 by Mauricio Davison RN  VTE Prevention/Management: SCDs off (sequential compression devices)  Taken 2/7/2025 2000 by Mauricio Davison RN  VTE Prevention/Management: SCDs off (sequential compression devices)  Intervention: Prevent Infection  Recent Flowsheet Documentation  Taken 2/8/2025 0400 by Mauricio Davison RN  Infection Prevention:   rest/sleep promoted   hand hygiene promoted   single patient room provided  Taken 2/8/2025 0000 by Mauricio Davison RN  Infection Prevention:   rest/sleep promoted   hand hygiene promoted   single patient room provided  Taken 2/7/2025 2000 by Mauricio Davison RN  Infection Prevention:   rest/sleep  promoted   hand hygiene promoted   single patient room provided  Goal: Optimal Comfort and Wellbeing  Outcome: Progressing  Intervention: Provide Person-Centered Care  Recent Flowsheet Documentation  Taken 2/7/2025 2000 by Mauricio Davison, RN  Trust Relationship/Rapport:   care explained   choices provided   reassurance provided   thoughts/feelings acknowledged  Goal: Readiness for Transition of Care  Outcome: Progressing

## 2025-02-08 NOTE — PROGRESS NOTES
Discussed care of patient with the intensivist, appreciate management.  Patient will be transferring to [facility] today and hospitalist will assume care in am.    No charge,  Vivian Samaniego MD

## 2025-02-08 NOTE — PROGRESS NOTES
Critical Care  Note      02/08/2025    Name: Supriya Herr MRN#: [id number]   Age: 75 year old YOB: 1950     [facility]  [facility] # 3    MV DAY # 0             Problem List:   Principal Problem:    Acute and chronic respiratory failure with hypoxia (H)  Active Problems:    Type 2 diabetes mellitus with diabetic chronic kidney disease (H)    ESRD on dialysis (H)    ESRD (end stage renal disease) on dialysis (H)    Influenza A    Elevated troponin    Abnormal chest x-ray    Anemia, unspecified type    Acute metabolic encephalopathy    Severe sepsis with acute organ dysfunction (H)    Pleural effusion           Summary/Hospital Course:     Patient was admitted, subsequently intubated for worsening respiratory failure and remained on mechanical ventilator 1/9 - 1/18. She was reintubated and remained on mechanical ventilator 1/20 - 1/25. Was treated with Tamiflu, IV antibiotic, steroid, has completed courses. Hospital course complicated by A-fib RVR. Was on amiodarone drip with eventual transition to p.o. Also required diltiazem for rate control. Required BiPAP--HFNC--intermittent BiPAP use. Ongoing tube feeding and modified diet per SLP. Delirium managed with Seroquel. Concern for recurrent pneumonia, hospital-acquired on 2/1, restarted on antibiotics. ID consulted, recommended discontinuing antibiotics, respiratory failure likely related to fluid overload.      Required transfer back to ICU on 2/5/25 for hypotension associated with bradycardia requiring dopamine infusion suspected 2/2 rate control medications. Rate improved and dopamine discontinued 2/5/25 at 2345. Cardiology consulted. Has remained in NSR without bradycardia on amiodarone 200 mg daily.    Hypoxic and hypercarbic respiratory failure has been gradually improving with volume removal with iHD.     Interval/overnight events: Dialyzed yesterday with 3 L pulled in 3 hours. Did not require levophed. No acute overnight events.     This  morning patient reporting 3/10 substernal chest pain  described as a pressure. Pain does not radiate. No fevers/chills, cough, abdominal pain.         Assessment and plan :     Supriya Herr is a 75 year old female PMH significant for ESRD on HD, HFpEF, COPD, DM type II, hypertension, and poor mobility (left AKA, Obesity, WC bound). Initially admitted 1/8/2025 for treatment of influenza A with COPD exacerbation complicated by Afib with RVR, volume overload, and delirium. Volume status improving with daily HD 2/6, 2/7 and 2/8 per Nephrology with improvement in respiratory status. Had some soft Bps during initial dialysis, so levophed was started but has since weaned off.     I have personally reviewed the daily labs, imaging studies, cultures and discussed the case with referring physician and consulting physicians.     My assessment and plan by system for this patient is as follows:    Neurology/Psychiatry:   # Delirium  # Anxiety  Improving/  - Discontinued Ativan prn  - Discontinue Seroquel 25 mg am  - Continue Seroquel 50 mg PM  - Zoloft 75 mg at bedtime  - Spiritual health consulted give lengthy hospital stay    Cardiovascular:   # Afib with RVR   # Acute on chronic HFpEF (LVEF 60% on 1/21/25)  # Symptomatic bradycardia, suspect medication induced - resolved  Had difficult to control Afib with RVR. Was on Amiodarone 200 mg every day, carvedilol 25 mg every day, and diliazem 60 mg q6h. On 2/5/25 developed bradycardia associated with hypotension. Improved with initiation of dopamine --> discontinued on 2/5 at 23:45. Has maintained NSR without bradycardia. Cardiology signed off 2/7. 2/8 patient reported substernal chest pain; ECG without ischemic changes and troponin consistent with previous.  - Tele and VS per ICU routine  - Holding carvedilol, and diltiazem  - Cardiology consulted, appreciate recs   - continue amiodarone only at 200 mg daily  - continue atorvastatin 20 mg at bedtime   - continue apixaban 5 mg  BID    Pulmonary/Ventilator Management:   # Acute hypoxic and hypercarbic respiratory failure 2/2 volume overload    # COPD exacerbation  # JONE  Respiratory status improving following HD. This morning did not tolerate O2 via oxymask so transitioned back to BiPAP. W  - CXR 2/6 prior to iHD with worsened congestion, and increasing dense consolidation of left lung consider most likely atelectasis (see ID below).  - repeat CXR today to get a new baseline after fluid removal  - volume removal with iHD per nephrology  - trend VBG  - continue duonebs QID & budesonide BID  - continue cetirizine 5 mg at bedtime  - continue BiPAP at night and PRN during the day, settings are 15/6 30%    GI and Nutrition :   # Constipation  - noted on CT 2/2   - Scheduled sennosides 10 ml BID, miralax once daily   - PRN senna tablets      # Dysphagia   Suspect due to recurrent intubation.  - Dietitian consulted.  - Nasoduodenal tube in place. Continue tube feedings for now; 4 pm to 6 am at 55 ml/hr.  - Did well with video swallow study on 1/31/25, diet is being advanced --> Pureed with thins.    Renal/Fluids/Electrolytes:   # ESRD on HD (T, Th, Sat --> M, W, F starting next week- makes minimal urine)  # Hyperkalemia 2/2 above - improving  # Hypervolemic hyponatremia - improving  # Volume overload  # Intradialytic hypotension   K+ 6.1 on ICU arrival --> Shifted with insulin & dextrose. Hemodialysis yesterday with 3.2 L removed. Repeating dialysis today and tomorrow. Following dialysis yesterday potassium improved from 5.7 -->4.0. Sodium also improved since HD yesterday. Patient did have soft Bps during initial HD in ICU on 2/6 with MAP down to 49 --> started on norepi. Bps improved, now off norepi since 09:30 on 2/7.   - holding lokelma  - Trend K+   - iHD planned today, off tomorrow   - Continue sevelamer, vit D3  - Nephrology resumed Lasix 50 mg po on nondialysis days on 2/3  - daily weights   - Start midodrine 5mg to be given before dialysis  "on dialysis days only    Infectious Disease:   # Influenza A, s/p treatment  # HCAP, s/p treatment   Continuing to monitor off antibiotics. CXR consistent with fluid overload, but with increasing dense consolidation of left lower lung and right upper lobe. Considering atelectasis vs PNA. No fevers, or leukocytosis. Clinically more consistent with fluid overload.  - Reevaluate CXR post-HD this afternoon   - No fevers or leukocytosis    Endocrine:   # DM2  HbA1c 6.5% on 1/17/25. On tube feeds from 4 pm to 6 am at 55 ml/hr.  - Sliding scale insulin (high) q4h  - Insulin glargine 15 units BID     Hematology/Oncology:   # Anemia of chronic disease  Hemoglobin 7-8, at baseline. Epo with HD per nephrology.  - continue to monitor     IV/Access:   - AV fistula  - CVC R internal jugular  - Small bore feeding tube    ICU Prophylaxis:   1. DVT: DOAC  2. Stress Ulcer: PPI is a home medication   3. Family Update: pending  4. Disposition - Plan to transfer to [facility] [place] this [place]    Clinically Significant Risk Factors          # Hypochloremia: Lowest Cl = 96 mmol/L in last 2 days, will monitor as appropriate      # Hypoalbuminemia: Lowest albumin = 2.8 g/dL at 1/13/2025  5:14 AM, will monitor as appropriate     # Hypertension: Noted on problem list    # Chronic heart failure with preserved ejection fraction: heart failure noted on problem list and last echo with EF >50%    # Acute Hypercapnic Respiratory Failure: based on venous blood gas results.  Continue supplemental oxygen and ventilatory support as indicated.        # DMII: A1C = 6.5 % (Ref range: <5.7 %) within past 6 months   # Obesity: Estimated body mass index is 33.17 kg/m  as calculated from the following:    Height as of this encounter: 1.702 m (5' 7.01\").    Weight as of this encounter: 96.1 kg (211 lb 13.8 oz).        # Financial/Environmental Concerns: none                             Key Medications:     Current Facility-Administered Medications   Medication Dose " Route Frequency Provider Last Rate Last Admin    - MEDICATION INSTRUCTIONS for Dialysis Patients -   Does not apply See Admin Instructions Braden Montana MD        amiodarone (PACERONE) tablet 200 mg  200 mg Oral Daily Cheo Farris MD   200 mg at 02/08/25 0853    apixaban ANTICOAGULANT (ELIQUIS) tablet 5 mg  5 mg Oral or Feeding Tube BID Denver Shipman MD   5 mg at 02/08/25 0853    atorvastatin (LIPITOR) tablet 20 mg  20 mg Oral or Feeding Tube QPM Denver Shipman MD   20 mg at 02/07/25 2006    B and C vitamin Complex with folic acid (NEPHRONEX) liquid 5 mL  5 mL Per Feeding Tube Daily Denver Shipman MD   5 mL at 02/08/25 0855    budesonide (PULMICORT) neb solution 1 mg  1 mg Nebulization BID Denver Shipman MD   1 mg at 02/08/25 0816    cetirizine (zyrTEC) tablet 5 mg  5 mg Oral or Feeding Tube At Bedtime Denver Shipman MD   5 mg at 02/07/25 2215    [Held by provider] diltiazem (CARDIZEM) tablet 60 mg  60 mg Per G Tube Q6H [facility] Liset Romero MD   60 mg at 02/05/25 1526    furosemide (LASIX) tablet 80 mg  80 mg Oral or Feeding Tube Daily Enu-Vivian Samaniego MD   80 mg at 02/08/25 0853    heparin lock flush 10 unit/mL injection 5-20 mL  5-20 mL Intracatheter Q24H Liset Romero MD   5 mL at 02/07/25 1208    insulin aspart (NovoLOG) injection (RAPID ACTING)  1-4 Units Subcutaneous Q4H Eren Mandel MD   1 Units at 02/08/25 0756    insulin glargine (LANTUS PEN) injection 15 Units  15 Units Subcutaneous BID Denver Shipman MD   15 Units at 02/08/25 0757    ipratropium - albuterol 0.5 mg/2.5 mg/3 mL (DUONEB) neb solution 3 mL  3 mL Nebulization 4x daily Denver Shipman MD   3 mL at 02/08/25 0816    lidocaine (PF) (XYLOCAINE) 1 % injection 1 mL  1 mL Intradermal Once Davide Madrid MD        miconazole (MICATIN) 2 % powder   Topical BID Denver Shipman MD   Given at 02/08/25 0755    midodrine (PROAMATINE) tablet 5 mg  5 mg Oral Once per day on Tuesday Thursday  Saturday Cheo Watt MD   5 mg at 02/08/25 0735    pantoprazole (PROTONIX) 2 mg/mL suspension 40 mg  40 mg Per Feeding Tube QAM Denver Mays MD   40 mg at 02/06/25 0829    Or    pantoprazole (PROTONIX) IV push injection 40 mg  40 mg Intravenous QAM Denver Mays MD   40 mg at 02/08/25 0749    polyethylene glycol (MIRALAX) Packet 17 g  17 g Oral or Feeding Tube Daily Liset Romero MD   17 g at 02/06/25 0828    Prosource TF20 ENfit Compatibl EN LIQD (PROSOURCE TF20) packet 60 mL  1 packet Per Feeding Tube BID Vivian Russell MD   60 mL at 02/07/25 2006    QUEtiapine (SEROquel) tablet 50 mg  50 mg Oral or Feeding Tube At Bedtime Cheo Watt MD   50 mg at 02/07/25 2215    sennosides (SENOKOT) syrup 10 mL  10 mL Oral or Feeding Tube BID Liset Romero MD   10 mL at 02/08/25 0853    sertraline (ZOLOFT) tablet 75 mg  75 mg Oral or Feeding Tube QPM Denver Shipman MD   75 mg at 02/07/25 2006    [Held by provider] sevelamer carbonate (RENVELA) tablet 800 mg  800 mg Oral TID w/meals Denver Shipman MD   800 mg at 02/06/25 0828    sodium zirconium cyclosilicate (LOKELMA) packet 10 g  10 g Oral Daily Celestina Ross NP        tacrolimus (PROTOPIC) 0.1 % ointment   Topical BID Denver Shipman MD   Given at 02/08/25 0910    vitamin D3 (CHOLECALCIFEROL) tablet 50 mcg  50 mcg Oral or Feeding Tube Daily Denver Shipman MD   50 mcg at 02/08/25 0853     Current Facility-Administered Medications   Medication Dose Route Frequency Provider Last Rate Last Admin    dextrose 10% infusion   Intravenous Continuous PRN Celestina Ross NP        dextrose 10% infusion   Intravenous Continuous PRN Denver Shipman MD        No lozenges or gum should be given while patient on BIPAP/AVAPS/AVAPS AE   Does not apply Continuous PRN Denver Shipman MD        norepinephrine (LEVOPHED) 4 mg in  mL infusion PREMIX  0.01-0.6 mcg/kg/min Intravenous Continuous Cheo Watt MD   Paused at  02/07/25 0933    Patient may continue current oral medications   Does not apply Continuous PRN Denver Shipman MD        sodium chloride 0.9 % infusion   Intravenous Continuous Denver Shipman MD 10 mL/hr at 02/05/25 1831 10 mL/hr at 02/05/25 1831              Physical Examination:   Temp:  [97.5  F (36.4  C)-98.8  F (37.1  C)] 97.7  F (36.5  C)  Pulse:  [57-80] 67  Resp:  [20] 20  BP: ()/(37-98) 126/39  FiO2 (%):  [30 %] 30 %  SpO2:  [88 %-100 %] 98 %    Intake/Output Summary (Last 24 hours) at 2/7/2025 0807  Last data filed at 2/7/2025 0600  Gross per 24 hour   Intake 1007.35 ml   Output 3200 ml   Net -2192.65 ml     Wt Readings from Last 4 Encounters:   02/07/25 96.1 kg (211 lb 13.8 oz)   06/14/24 101.1 kg (222 lb 14.2 oz)   06/03/24 101.1 kg (222 lb 14.2 oz)   04/24/24 101.1 kg (222 lb 14.2 oz)     BP - Mean:  [] 73  FiO2 (%): 30 %, Resp: 20  Recent Labs   Lab 02/07/25  1117 02/07/25  0546 02/06/25  1732 02/06/25  1020 02/05/25 2004 02/05/25 1733   PH  --   --   --   --   --  7.42   PCO2  --   --   --   --   --  49*   PO2  --   --   --   --   --  78*   HCO3  --   --   --   --   --  32*   O2PER 2 60 0 0   < > 30    < > = values in this interval not displayed.       GEN: no acute distress   HEENT: head ncat, sclera anicteric,   PULM: BiPAP. Crackles anteriorly.  CV/COR: RRR S1S2 no gallop,  No rub, no murmur. No lower extremity pitting edema  ABD: soft nontender, hypoactive bowel sounds, no mass  EXT:  warm and well perfused x4. L AKA.  NEURO: PERRL, no obvious deficits  SKIN: no obvious rash  LINES: Clean, dry intact. Nasoduodenal tube in place, R internal jugular catheter, L fistula.          Data:   All data and imaging reviewed     ROUTINE ICU LABS (Last four results)  CMP  Recent Labs   Lab 02/08/25  0756 02/08/25  0453 02/08/25  0452 02/08/25  0006 02/07/25  0548 02/07/25  0546 02/07/25  0013 02/06/25  2048 02/06/25  0958 02/06/25  0836 02/06/25  0429 02/06/25  0416 02/05/25 2006  02/05/25 2004 02/05/25  1249 02/05/25  0631 02/03/25  0950 02/03/25  0453 02/02/25  0814 02/02/25  0542   NA  --  136  --   --   --  135  --   --   --  134*  --  133*  --  131*   < > 134*   < > 136  --  138   POTASSIUM  --  4.9  --   --   --  4.3  --  4.0  --  5.7*  --  5.4*  5.4*   < > 6.1*   < > 4.9   < > 5.7*  --  4.9   CHLORIDE  --  97*  --   --   --  96*  --   --   --  94*  --  94*  --  91*   < > 95*   < > 99  --  99   CO2  --  29  --   --   --  29  --   --   --  29  --  28  --  29   < > 30*   < > 29  --  30*   ANIONGAP  --  10  --   --   --  10  --   --   --  11  --  11  --  11   < > 9   < > 8  --  9   * 204* 179* 164*   < > 150*   < > 86   < > 77   < > 233*   < > 256*   < > 174*   < > 158*   < > 211*   BUN  --  77.9*  --   --   --  49.8*  --   --   --  77.9*  --  76.6*  --  64.1*   < > 49.2*   < > 58.9*  --  36.2*   CR  --  3.96*  --   --   --  2.79*  --   --   --  3.95*  --  3.88*  --  3.34*   < > 2.91*   < > 3.49*  --  2.38*   GFRESTIMATED  --  11*  --   --   --  17*  --   --   --  11*  --  11*  --  14*   < > 16*   < > 13*  --  21*   JODY  --  9.8  --   --   --  10.0  --   --   --  9.9  --  9.8  --  10.2   < > 9.5   < > 9.8  --  9.5   MAG  --  2.5*  --   --   --  2.3  --   --   --   --   --  2.3  --   --   --  2.2   < > 2.2  --  2.1   PHOS  --   --   --   --   --   --   --   --   --   --   --   --   --   --   --   --   --  2.6  --   --    PROTTOTAL  --   --   --   --   --   --   --   --   --   --   --   --   --  6.4  --   --   --   --   --  5.6*   ALBUMIN  --   --   --   --   --   --   --   --   --   --   --   --   --  3.5  --   --   --   --   --  3.2*   BILITOTAL  --   --   --   --   --   --   --   --   --   --   --   --   --  0.3  --   --   --   --   --  0.2   ALKPHOS  --   --   --   --   --   --   --   --   --   --   --   --   --  75  --   --   --   --   --  55   AST  --   --   --   --   --   --   --   --   --   --   --   --   --  17  --   --   --   --   --  12   ALT  --   --   --   --   --   --   " --   --   --   --   --   --   --  24  --   --   --   --   --  22    < > = values in this interval not displayed.     CBC  Recent Labs   Lab 02/08/25  0801 02/07/25  0546 02/06/25  1020 02/05/25  1736   WBC 5.5 6.2 9.2 6.5   RBC 2.30* 2.33* 2.40* 2.25*   HGB 7.4* 7.4*  7.4* 7.7* 7.2*   HCT 23.7* 24.2* 25.0* 23.5*   * 104* 104* 104*   MCH 32.2 32.2 32.1 32.0   MCHC 31.2* 29.8* 30.8* 30.6*   RDW 16.7* 17.0* 16.7* 17.2*    210 199 161     INRNo lab results found in last 7 days.  Arterial Blood Gas  Recent Labs   Lab 02/07/25  1117 02/07/25  0546 02/06/25  1732 02/06/25  1020 02/05/25 2004 02/05/25  1733   PH  --   --   --   --   --  7.42   PCO2  --   --   --   --   --  49*   PO2  --   --   --   --   --  78*   HCO3  --   --   --   --   --  32*   O2PER 2 60 0 0   < > 30    < > = values in this interval not displayed.       All cultures:  No results for input(s): \"CULT\" in the last 168 hours.  Recent Results (from the past 24 hours)   XR Chest Port 1 View    Narrative    EXAM: XR CHEST PORT 1 VIEW  LOCATION: [facility]  DATE: 2/6/2025    INDICATION: Shortness of breath  COMPARISON: 2/5/2025.      Impression    IMPRESSION: Stable cardiac silhouette with mitral annulus calcification. Right IJ approach vascular catheter with distal tip terminating at the right atrium. Feeding tube courses below the diaphragm with distal tip beyond the inferior field of view.   Pulmonary vascular congestion with bilateral interstitial prominence appears worsened and may represent interstitial edema or atypical infection. Increasing dense consolidation of the left lung base which may represent atelectasis or pneumonia. Trace   right and small left pleural effusions are mildly increased. No discernible pneumothorax. Left axillary vascular graft.                                Gracie Basil, MS4  [employer]    Time Spent on this Encounter   Supriya was seen and evaluated by " me on 02/08/25.  She was in critical condition as the result of hypoxic respiratory failure and CO2 narcosis/encephalopathy.    Her condition is now Fair.      I was present with the student who participated in the service and in the documentation of the note. I have verified the history and personally performed the physical exam and medical decision-making. I agree with the assessment and plan of care as documented in the note.      The acute issues managed by me today include acute on chronic hypoxic respiratory failure, fluid overload, hypotension, and ESRD.   Supportive interventions provided and/or ordered by me include BIPAP, CXR, oral midodrine pre-HD, iHD, and hemodynamic monitoring.    D/w hospitalist, will transition to IMC later today if she continues to be stable after iHD.    Total Critical Care time spent by me, excluding procedures, was 30 minutes.    FLORA MARTINEZ

## 2025-02-09 LAB
ABO + RH BLD: NORMAL
ANION GAP SERPL CALCULATED.3IONS-SCNC: 7 MMOL/L (ref 7–15)
BASE EXCESS BLDV CALC-SCNC: 6.9 MMOL/L (ref -3–3)
BLD GP AB SCN SERPL QL: NEGATIVE
BLD PROD TYP BPU: NORMAL
BLOOD COMPONENT TYPE: NORMAL
BUN SERPL-MCNC: 66.1 MG/DL (ref 8–23)
CALCIUM SERPL-MCNC: 9.6 MG/DL (ref 8.8–10.4)
CHLORIDE SERPL-SCNC: 98 MMOL/L (ref 98–107)
CODING SYSTEM: NORMAL
CREAT SERPL-MCNC: 3.34 MG/DL (ref 0.51–0.95)
CROSSMATCH: NORMAL
EGFRCR SERPLBLD CKD-EPI 2021: 14 ML/MIN/1.73M2
ERYTHROCYTE [DISTWIDTH] IN BLOOD BY AUTOMATED COUNT: 16.7 % (ref 10–15)
GLUCOSE BLDC GLUCOMTR-MCNC: 119 MG/DL (ref 70–99)
GLUCOSE BLDC GLUCOMTR-MCNC: 119 MG/DL (ref 70–99)
GLUCOSE BLDC GLUCOMTR-MCNC: 157 MG/DL (ref 70–99)
GLUCOSE BLDC GLUCOMTR-MCNC: 158 MG/DL (ref 70–99)
GLUCOSE BLDC GLUCOMTR-MCNC: 183 MG/DL (ref 70–99)
GLUCOSE BLDC GLUCOMTR-MCNC: 187 MG/DL (ref 70–99)
GLUCOSE SERPL-MCNC: 228 MG/DL (ref 70–99)
HCO3 BLDV-SCNC: 33 MMOL/L (ref 21–28)
HCO3 SERPL-SCNC: 31 MMOL/L (ref 22–29)
HCT VFR BLD AUTO: 22.6 % (ref 35–47)
HGB BLD-MCNC: 6.9 G/DL (ref 11.7–15.7)
HGB BLD-MCNC: 8.8 G/DL (ref 11.7–15.7)
ISSUE DATE AND TIME: NORMAL
MAGNESIUM SERPL-MCNC: 2.4 MG/DL (ref 1.7–2.3)
MCH RBC QN AUTO: 31.5 PG (ref 26.5–33)
MCHC RBC AUTO-ENTMCNC: 30.5 G/DL (ref 31.5–36.5)
MCV RBC AUTO: 103 FL (ref 78–100)
O2/TOTAL GAS SETTING VFR VENT: 30 %
OXYHGB MFR BLDV: 73 % (ref 70–75)
PCO2 BLDV: 56 MM HG (ref 40–50)
PH BLDV: 7.38 [PH] (ref 7.32–7.43)
PLATELET # BLD AUTO: 169 10E3/UL (ref 150–450)
PO2 BLDV: 40 MM HG (ref 25–47)
POTASSIUM SERPL-SCNC: 4.3 MMOL/L (ref 3.4–5.3)
RBC # BLD AUTO: 2.19 10E6/UL (ref 3.8–5.2)
SAO2 % BLDV: 74.9 % (ref 70–75)
SODIUM SERPL-SCNC: 136 MMOL/L (ref 135–145)
SPECIMEN EXP DATE BLD: NORMAL
UNIT ABO/RH: NORMAL
UNIT NUMBER: NORMAL
UNIT STATUS: NORMAL
UNIT TYPE ISBT: 6200
WBC # BLD AUTO: 3.9 10E3/UL (ref 4–11)

## 2025-02-09 PROCEDURE — 87205 SMEAR GRAM STAIN: CPT | Performed by: INTERNAL MEDICINE

## 2025-02-09 PROCEDURE — 120N000013 HC R&B IMCU

## 2025-02-09 PROCEDURE — 250N000013 HC RX MED GY IP 250 OP 250 PS 637: Performed by: HOSPITALIST

## 2025-02-09 PROCEDURE — 94640 AIRWAY INHALATION TREATMENT: CPT

## 2025-02-09 PROCEDURE — 94660 CPAP INITIATION&MGMT: CPT

## 2025-02-09 PROCEDURE — 82805 BLOOD GASES W/O2 SATURATION: CPT | Performed by: HOSPITALIST

## 2025-02-09 PROCEDURE — 250N000013 HC RX MED GY IP 250 OP 250 PS 637: Performed by: NURSE PRACTITIONER

## 2025-02-09 PROCEDURE — 83735 ASSAY OF MAGNESIUM: CPT | Performed by: HOSPITALIST

## 2025-02-09 PROCEDURE — 999N000157 HC STATISTIC RCP TIME EA 10 MIN

## 2025-02-09 PROCEDURE — 250N000009 HC RX 250: Performed by: HOSPITALIST

## 2025-02-09 PROCEDURE — 36415 COLL VENOUS BLD VENIPUNCTURE: CPT | Performed by: HOSPITALIST

## 2025-02-09 PROCEDURE — 250N000013 HC RX MED GY IP 250 OP 250 PS 637: Performed by: INTERNAL MEDICINE

## 2025-02-09 PROCEDURE — 85014 HEMATOCRIT: CPT | Performed by: INTERNAL MEDICINE

## 2025-02-09 PROCEDURE — 85018 HEMOGLOBIN: CPT | Performed by: HOSPITALIST

## 2025-02-09 PROCEDURE — P9016 RBC LEUKOCYTES REDUCED: HCPCS | Performed by: SURGERY

## 2025-02-09 PROCEDURE — 80048 BASIC METABOLIC PNL TOTAL CA: CPT | Performed by: HOSPITALIST

## 2025-02-09 PROCEDURE — 86900 BLOOD TYPING SEROLOGIC ABO: CPT | Performed by: SURGERY

## 2025-02-09 PROCEDURE — 99233 SBSQ HOSP IP/OBS HIGH 50: CPT | Performed by: HOSPITALIST

## 2025-02-09 PROCEDURE — 94640 AIRWAY INHALATION TREATMENT: CPT | Mod: 76

## 2025-02-09 PROCEDURE — 86923 COMPATIBILITY TEST ELECTRIC: CPT | Performed by: SURGERY

## 2025-02-09 PROCEDURE — 86850 RBC ANTIBODY SCREEN: CPT | Performed by: SURGERY

## 2025-02-09 RX ADMIN — MICONAZOLE NITRATE: 2 POWDER TOPICAL at 08:06

## 2025-02-09 RX ADMIN — BUDESONIDE 1 MG: 1 INHALANT ORAL at 19:24

## 2025-02-09 RX ADMIN — Medication 60 ML: at 08:21

## 2025-02-09 RX ADMIN — INSULIN ASPART 1 UNITS: 100 INJECTION, SOLUTION INTRAVENOUS; SUBCUTANEOUS at 08:06

## 2025-02-09 RX ADMIN — QUETIAPINE FUMARATE 50 MG: 50 TABLET ORAL at 21:18

## 2025-02-09 RX ADMIN — Medication 1 MG: at 21:18

## 2025-02-09 RX ADMIN — SERTRALINE HYDROCHLORIDE 75 MG: 50 TABLET ORAL at 19:55

## 2025-02-09 RX ADMIN — CETIRIZINE HYDROCHLORIDE 5 MG: 5 TABLET ORAL at 21:18

## 2025-02-09 RX ADMIN — APIXABAN 5 MG: 5 TABLET, FILM COATED ORAL at 21:18

## 2025-02-09 RX ADMIN — FUROSEMIDE 80 MG: 40 TABLET ORAL at 08:03

## 2025-02-09 RX ADMIN — Medication 40 MG: at 08:03

## 2025-02-09 RX ADMIN — INSULIN GLARGINE 15 UNITS: 100 INJECTION, SOLUTION SUBCUTANEOUS at 21:16

## 2025-02-09 RX ADMIN — IPRATROPIUM BROMIDE AND ALBUTEROL SULFATE 3 ML: .5; 3 SOLUTION RESPIRATORY (INHALATION) at 16:16

## 2025-02-09 RX ADMIN — AMIODARONE HYDROCHLORIDE 200 MG: 200 TABLET ORAL at 08:03

## 2025-02-09 RX ADMIN — APIXABAN 5 MG: 5 TABLET, FILM COATED ORAL at 08:03

## 2025-02-09 RX ADMIN — ACETAMINOPHEN 650 MG: 325 TABLET, FILM COATED ORAL at 23:39

## 2025-02-09 RX ADMIN — ATORVASTATIN CALCIUM 20 MG: 20 TABLET, FILM COATED ORAL at 19:55

## 2025-02-09 RX ADMIN — IPRATROPIUM BROMIDE AND ALBUTEROL SULFATE 3 ML: .5; 3 SOLUTION RESPIRATORY (INHALATION) at 07:30

## 2025-02-09 RX ADMIN — INSULIN ASPART 1 UNITS: 100 INJECTION, SOLUTION INTRAVENOUS; SUBCUTANEOUS at 23:47

## 2025-02-09 RX ADMIN — TACROLIMUS: 1 OINTMENT TOPICAL at 21:18

## 2025-02-09 RX ADMIN — Medication 50 MCG: at 08:03

## 2025-02-09 RX ADMIN — MICONAZOLE NITRATE: 2 POWDER TOPICAL at 21:18

## 2025-02-09 RX ADMIN — IPRATROPIUM BROMIDE AND ALBUTEROL SULFATE 3 ML: .5; 3 SOLUTION RESPIRATORY (INHALATION) at 10:07

## 2025-02-09 RX ADMIN — BUDESONIDE 1 MG: 1 INHALANT ORAL at 10:10

## 2025-02-09 RX ADMIN — INSULIN ASPART 1 UNITS: 100 INJECTION, SOLUTION INTRAVENOUS; SUBCUTANEOUS at 12:30

## 2025-02-09 RX ADMIN — INSULIN ASPART 1 UNITS: 100 INJECTION, SOLUTION INTRAVENOUS; SUBCUTANEOUS at 00:39

## 2025-02-09 RX ADMIN — INSULIN ASPART 1 UNITS: 100 INJECTION, SOLUTION INTRAVENOUS; SUBCUTANEOUS at 04:06

## 2025-02-09 RX ADMIN — SENNOSIDES 10 ML: 8.8 LIQUID ORAL at 08:03

## 2025-02-09 RX ADMIN — SODIUM ZIRCONIUM CYCLOSILICATE 10 G: 5 POWDER, FOR SUSPENSION ORAL at 08:03

## 2025-02-09 RX ADMIN — INSULIN GLARGINE 15 UNITS: 100 INJECTION, SOLUTION SUBCUTANEOUS at 08:06

## 2025-02-09 RX ADMIN — TACROLIMUS: 1 OINTMENT TOPICAL at 08:06

## 2025-02-09 RX ADMIN — IPRATROPIUM BROMIDE AND ALBUTEROL SULFATE 3 ML: .5; 3 SOLUTION RESPIRATORY (INHALATION) at 19:21

## 2025-02-09 RX ADMIN — Medication 5 ML: at 08:04

## 2025-02-09 RX ADMIN — ACETAMINOPHEN 650 MG: 325 TABLET, FILM COATED ORAL at 15:17

## 2025-02-09 RX ADMIN — Medication 60 ML: at 21:18

## 2025-02-09 ASSESSMENT — ACTIVITIES OF DAILY LIVING (ADL)
ADLS_ACUITY_SCORE: 84
ADLS_ACUITY_SCORE: 84
ADLS_ACUITY_SCORE: 92
ADLS_ACUITY_SCORE: 84
ADLS_ACUITY_SCORE: 92
ADLS_ACUITY_SCORE: 84
ADLS_ACUITY_SCORE: 84
ADLS_ACUITY_SCORE: 92
ADLS_ACUITY_SCORE: 84
ADLS_ACUITY_SCORE: 92
ADLS_ACUITY_SCORE: 92
ADLS_ACUITY_SCORE: 84
ADLS_ACUITY_SCORE: 92
ADLS_ACUITY_SCORE: 92
ADLS_ACUITY_SCORE: 84
ADLS_ACUITY_SCORE: 84
ADLS_ACUITY_SCORE: 92
ADLS_ACUITY_SCORE: 92
ADLS_ACUITY_SCORE: 84
ADLS_ACUITY_SCORE: 92
ADLS_ACUITY_SCORE: 84
ADLS_ACUITY_SCORE: 92
ADLS_ACUITY_SCORE: 92

## 2025-02-09 NOTE — PLAN OF CARE
"Pt slept well with bipap on through the night. Hgb 6.9, plan to give 1 unit rbcs. Also plan to [place] [facility] [place].  Problem: Adult Inpatient Plan of Care  Goal: Plan of Care Review  Description: The Plan of Care Review/Shift note should be completed every shift.  The Outcome Evaluation is a brief statement about your assessment that the patient is improving, declining, or no change.  This information will be displayed automatically on your shift  note.  Outcome: Progressing  Flowsheets (Taken 2/9/2025 0743)  Overall Patient Progress: no change  Goal: Patient-Specific Goal (Individualized)  Description: You can add care plan individualizations to a care plan. Examples of Individualization might be:  \"Parent requests to be called daily at 9am for status\", \"I have a hard time hearing out of my right ear\", or \"Do not touch me to wake me up as it startles  me\".  Outcome: Progressing  Goal: Absence of Hospital-Acquired Illness or Injury  Outcome: Progressing  Intervention: Identify and Manage Fall Risk  Recent Flowsheet Documentation  Taken 2/9/2025 0400 by Fernando Diamond RN  Safety Promotion/Fall Prevention: activity supervised  Taken 2/9/2025 0000 by Fernando Diamond RN  Safety Promotion/Fall Prevention: activity supervised  Taken 2/8/2025 2000 by Fernando Diamond RN  Safety Promotion/Fall Prevention: activity supervised  Intervention: Prevent Skin Injury  Recent Flowsheet Documentation  Taken 2/9/2025 0400 by Fernando Diamond RN  Body Position:   turned   position changed independently   heels elevated   upper extremity elevated  Skin Protection:   adhesive use limited   incontinence pads utilized   pulse oximeter probe site changed   silicone foam dressing in place  Taken 2/9/2025 0200 by Fernando Diamond RN  Body Position:   turned   position changed independently   heels elevated   upper extremity elevated  Taken 2/9/2025 0000 by Fernando Diamond RN  Body Position:   turned   position changed " independently   heels elevated   upper extremity elevated  Skin Protection:   adhesive use limited   incontinence pads utilized   pulse oximeter probe site changed   silicone foam dressing in place  Taken 2/8/2025 2200 by Fernando Diamond RN  Body Position:   turned   position changed independently   heels elevated   legs elevated   upper extremity elevated  Taken 2/8/2025 2000 by Fernando Diamond RN  Body Position:   turned   position changed independently   heels elevated   upper extremity elevated  Skin Protection:   adhesive use limited   incontinence pads utilized   pulse oximeter probe site changed   silicone foam dressing in place  Intervention: Prevent and Manage VTE (Venous Thromboembolism) Risk  Recent Flowsheet Documentation  Taken 2/9/2025 0400 by Fernando Diamond RN  VTE Prevention/Management: SCDs off (sequential compression devices)  Taken 2/9/2025 0000 by Fernando Diamond RN  VTE Prevention/Management: SCDs off (sequential compression devices)  Taken 2/8/2025 2000 by Fernando Diamond RN  VTE Prevention/Management: SCDs off (sequential compression devices)  Intervention: Prevent Infection  Recent Flowsheet Documentation  Taken 2/9/2025 0400 by Fernando Diamond RN  Infection Prevention:   cohorting utilized   environmental surveillance performed   equipment surfaces disinfected   hand hygiene promoted   personal protective equipment utilized   rest/sleep promoted   single patient room provided  Taken 2/9/2025 0000 by Fernando Diamond RN  Infection Prevention:   cohorting utilized   environmental surveillance performed   equipment surfaces disinfected   hand hygiene promoted   personal protective equipment utilized   rest/sleep promoted   single patient room provided  Taken 2/8/2025 2000 by Fernando Diamond RN  Infection Prevention:   cohorting utilized   environmental surveillance performed   equipment surfaces disinfected   hand hygiene promoted   personal protective equipment  utilized   rest/sleep promoted   single patient room provided  Goal: Optimal Comfort and Wellbeing  Outcome: Progressing  Intervention: Provide Person-Centered Care  Recent Flowsheet Documentation  Taken 2/9/2025 0400 by Fernando Diamond RN  Trust Relationship/Rapport:   care explained   choices provided   emotional support provided   empathic listening provided   questions answered   questions encouraged   reassurance provided   thoughts/feelings acknowledged  Taken 2/9/2025 0000 by Fernando Diamond RN  Trust Relationship/Rapport:   care explained   choices provided   emotional support provided   empathic listening provided   questions answered   questions encouraged   reassurance provided   thoughts/feelings acknowledged  Taken 2/8/2025 2000 by Fernando Diamond RN  Trust Relationship/Rapport:   care explained   choices provided   emotional support provided   empathic listening provided   questions answered   questions encouraged   reassurance provided   thoughts/feelings acknowledged  Goal: Readiness for Transition of Care  Outcome: Progressing   Goal Outcome Evaluation:           Overall Patient Progress: no changeOverall Patient Progress: no change

## 2025-02-09 NOTE — PROGRESS NOTES
Pt was on Bipap 14/5 30% overnight and tolerating well. Mepilex  applied over the bridge of the nose. RT will continue to follow.  2/9/2025  Adam Daniels, RT

## 2025-02-09 NOTE — PLAN OF CARE
"Oxygen: Intermittent NC 3L to Bipap 12/6 30%  Continuous Drips: SL  RASS: 0  Pain/CPOT: Pain this evening around abdomen, PRN tylenol given.   Mobility: NOOBY.   Lines: R internal jugular CVC, x1 PIV  : Oliguric. Bladder scanned this evening 172. HD today 2L pulled.   Skin: Mepilex is on  Diet/Bowel: Last BM today  Events: patient confusion increased throughout day. Per med student this morning patient had episode of chest pain. EKG normal, cardiac biomarkers ordered. No additional chest pain occurred after. HD this morning, per Dr. Watt MAP above 60 okay. No levo needed today.TF started at 1600. Increased sputum this evening, and 99.9 temp. Patient temp hot. Gave Tylenol and turned temp down in room. Intensivist notified, sputum culture ordered.   Social: Caroline Gray, Daughter, at bedside and updated.       Problem: Adult Inpatient Plan of Care  Goal: Plan of Care Review  Description: The Plan of Care Review/Shift note should be completed every shift.  The Outcome Evaluation is a brief statement about your assessment that the patient is improving, declining, or no change.  This information will be displayed automatically on your shift  note.  Outcome: Progressing  Goal: Patient-Specific Goal (Individualized)  Description: You can add care plan individualizations to a care plan. Examples of Individualization might be:  \"Parent requests to be called daily at 9am for status\", \"I have a hard time hearing out of my right ear\", or \"Do not touch me to wake me up as it startles  me\".  Outcome: Progressing  Goal: Absence of Hospital-Acquired Illness or Injury  Outcome: Progressing  Intervention: Identify and Manage Fall Risk  Recent Flowsheet Documentation  Taken 2/8/2025 1600 by Tammy Tubbs, RN  Safety Promotion/Fall Prevention: activity supervised  Taken 2/8/2025 1200 by Tammy Tubbs, RN  Safety Promotion/Fall Prevention: activity supervised  Taken 2/8/2025 0815 by Tammy Tubbs, RN  Safety Promotion/Fall " Prevention: activity supervised  Intervention: Prevent Skin Injury  Recent Flowsheet Documentation  Taken 2/8/2025 1800 by Tammy Tubbs RN  Body Position:   turned   heels elevated  Taken 2/8/2025 1600 by Tammy Tubbs RN  Body Position:   turned   side-lying   heels elevated  Skin Protection:   adhesive use limited   incontinence pads utilized   pulse oximeter probe site changed   silicone foam dressing in place  Taken 2/8/2025 1400 by Tammy Tubbs RN  Body Position:   turned   side-lying   heels elevated  Taken 2/8/2025 1200 by Tammy Tubbs RN  Body Position:   turned   side-lying   heels elevated  Skin Protection:   adhesive use limited   incontinence pads utilized   pulse oximeter probe site changed   silicone foam dressing in place  Taken 2/8/2025 1000 by Tammy Tubbs RN  Body Position:   turned   heels elevated   lower extremity elevated   upper extremity elevated  Taken 2/8/2025 0800 by Tammy Tubbs RN  Skin Protection:   adhesive use limited   incontinence pads utilized   pulse oximeter probe site changed   silicone foam dressing in place  Intervention: Prevent and Manage VTE (Venous Thromboembolism) Risk  Recent Flowsheet Documentation  Taken 2/8/2025 1600 by Tammy Tubbs RN  VTE Prevention/Management: SCDs off (sequential compression devices)  Taken 2/8/2025 1200 by Tammy Tubbs RN  VTE Prevention/Management: SCDs off (sequential compression devices)  Taken 2/8/2025 0815 by Tammy Tubbs RN  VTE Prevention/Management: SCDs off (sequential compression devices)  Intervention: Prevent Infection  Recent Flowsheet Documentation  Taken 2/8/2025 1600 by Tammy Tubbs RN  Infection Prevention:   cohorting utilized   environmental surveillance performed   equipment surfaces disinfected   hand hygiene promoted   personal protective equipment utilized   rest/sleep promoted   single patient room provided  Taken 2/8/2025 1200 by Tammy Tubbs  RN  Infection Prevention:   cohorting utilized   environmental surveillance performed   equipment surfaces disinfected   hand hygiene promoted   personal protective equipment utilized   rest/sleep promoted   single patient room provided  Taken 2/8/2025 0815 by Tammy Tubbs RN  Infection Prevention:   cohorting utilized   environmental surveillance performed   equipment surfaces disinfected   hand hygiene promoted   personal protective equipment utilized   rest/sleep promoted   single patient room provided  Goal: Optimal Comfort and Wellbeing  Outcome: Progressing  Intervention: Provide Person-Centered Care  Recent Flowsheet Documentation  Taken 2/8/2025 1600 by Tammy Tubbs RN  Trust Relationship/Rapport:   care explained   choices provided   emotional support provided   empathic listening provided   questions answered   questions encouraged   reassurance provided   thoughts/feelings acknowledged  Taken 2/8/2025 1200 by Tammy Tubbs, RN  Trust Relationship/Rapport:   care explained   choices provided   emotional support provided   empathic listening provided   questions answered   questions encouraged   reassurance provided   thoughts/feelings acknowledged  Taken 2/8/2025 0800 by Tammy Tubbs RN  Trust Relationship/Rapport:   care explained   choices provided   emotional support provided   empathic listening provided   questions answered   questions encouraged   reassurance provided   thoughts/feelings acknowledged  Goal: Readiness for Transition of Care  Outcome: Progressing     Problem: Fall Injury Risk  Goal: Absence of Fall and Fall-Related Injury  Outcome: Progressing  Intervention: Identify and Manage Contributors  Recent Flowsheet Documentation  Taken 2/8/2025 1600 by Tammy Tubbs, RN  Self-Care Promotion: meal set-up provided  Medication Review/Management:   medications reviewed   high-risk medications identified  Taken 2/8/2025 1200 by Tammy Tubbs, RN  Medication  Review/Management:   medications reviewed   high-risk medications identified  Taken 2/8/2025 0815 by Tammy Tubbs RN  Medication Review/Management:   medications reviewed   high-risk medications identified  Intervention: Promote Injury-Free Environment  Recent Flowsheet Documentation  Taken 2/8/2025 1600 by Tammy Tubbs RN  Safety Promotion/Fall Prevention: activity supervised  Taken 2/8/2025 1200 by Tammy Tubbs RN  Safety Promotion/Fall Prevention: activity supervised  Taken 2/8/2025 0815 by Tammy Tubbs RN  Safety Promotion/Fall Prevention: activity supervised     Problem: Pain Acute  Goal: Optimal Pain Control and Function  Outcome: Progressing  Intervention: Optimize Psychosocial Wellbeing  Recent Flowsheet Documentation  Taken 2/8/2025 1600 by Tammy Tubbs RN  Supportive Measures:   active listening utilized   positive reinforcement provided   relaxation techniques promoted   verbalization of feelings encouraged  Diversional Activities: television  Taken 2/8/2025 1200 by Tammy Tubbs RN  Supportive Measures:   active listening utilized   positive reinforcement provided   relaxation techniques promoted   verbalization of feelings encouraged  Diversional Activities: television  Taken 2/8/2025 0800 by Tammy Tubbs RN  Supportive Measures:   active listening utilized   positive reinforcement provided   relaxation techniques promoted   verbalization of feelings encouraged  Diversional Activities: television  Intervention: Prevent or Manage Pain  Recent Flowsheet Documentation  Taken 2/8/2025 1600 by Tammy Tubbs RN  Sensory Stimulation Regulation:   care clustered   lighting decreased   quiet environment promoted   television on  Sleep/Rest Enhancement:   comfort measures   natural light exposure provided   regular sleep/rest pattern promoted   relaxation techniques promoted  Medication Review/Management:   medications reviewed   high-risk medications  identified  Taken 2/8/2025 1200 by Tammy Tubbs RN  Sensory Stimulation Regulation:   care clustered   lighting decreased   quiet environment promoted   television on  Sleep/Rest Enhancement:   comfort measures   natural light exposure provided   regular sleep/rest pattern promoted   relaxation techniques promoted  Medication Review/Management:   medications reviewed   high-risk medications identified  Taken 2/8/2025 0815 by Tammy Tubbs RN  Sensory Stimulation Regulation:   care clustered   lighting decreased   quiet environment promoted   television on  Medication Review/Management:   medications reviewed   high-risk medications identified  Taken 2/8/2025 0800 by Tammy Tubbs RN  Sleep/Rest Enhancement:   comfort measures   natural light exposure provided   regular sleep/rest pattern promoted   relaxation techniques promoted     Problem: Gas Exchange Impaired  Goal: Optimal Gas Exchange  Outcome: Progressing  Intervention: Optimize Oxygenation and Ventilation  Recent Flowsheet Documentation  Taken 2/8/2025 1800 by Tammy Tubbs RN  Head of Bed (HOB) Positioning: HOB at 30-45 degrees  Taken 2/8/2025 1600 by Tammy Tubbs RN  Airway/Ventilation Management:   airway patency maintained   calming measures promoted   oxygen therapy provided   position adjusted   positive pressure ventilation provided  Head of Bed (HOB) Positioning: HOB at 45 degrees  Taken 2/8/2025 1400 by Tammy Tubbs RN  Head of Bed (HOB) Positioning: HOB at 45 degrees  Taken 2/8/2025 1200 by Tammy Tubbs RN  Airway/Ventilation Management:   airway patency maintained   calming measures promoted   oxygen therapy provided   position adjusted   positive pressure ventilation provided  Head of Bed (HOB) Positioning: HOB at 45 degrees  Taken 2/8/2025 0815 by Tammy Tubbs RN  Airway/Ventilation Management:   airway patency maintained   calming measures promoted   oxygen therapy provided   position  adjusted   positive pressure ventilation provided     Problem: Risk for Delirium  Goal: Optimal Coping  Outcome: Progressing  Intervention: Optimize Psychosocial Adjustment to Delirium  Recent Flowsheet Documentation  Taken 2/8/2025 1600 by Tammy Tubbs RN  Supportive Measures:   active listening utilized   positive reinforcement provided   relaxation techniques promoted   verbalization of feelings encouraged  Taken 2/8/2025 1200 by Tammy Tubbs RN  Supportive Measures:   active listening utilized   positive reinforcement provided   relaxation techniques promoted   verbalization of feelings encouraged  Taken 2/8/2025 0800 by Tammy Tubbs RN  Supportive Measures:   active listening utilized   positive reinforcement provided   relaxation techniques promoted   verbalization of feelings encouraged  Goal: Improved Behavioral Control  Outcome: Progressing  Intervention: Prevent and Manage Agitation  Recent Flowsheet Documentation  Taken 2/8/2025 1600 by Tammy Tubbs RN  Environment Familiarity/Consistency:   daily routine followed   familiar objects from home provided  Taken 2/8/2025 1200 by Tammy Tubbs RN  Environment Familiarity/Consistency:   daily routine followed   familiar objects from home provided  Taken 2/8/2025 0800 by Tammy Tubbs RN  Environment Familiarity/Consistency:   daily routine followed   familiar objects from home provided  Intervention: Minimize Safety Risk  Recent Flowsheet Documentation  Taken 2/8/2025 1600 by Tammy Tubbs RN  Enhanced Safety Measures:    at bedside   room near unit station   pain management   review medications for side effects with activity  Trust Relationship/Rapport:   care explained   choices provided   emotional support provided   empathic listening provided   questions answered   questions encouraged   reassurance provided   thoughts/feelings acknowledged  Taken 2/8/2025 1200 by Tammy Tubbs  RN  Enhanced Safety Measures:    at bedside   room near unit station   pain management   review medications for side effects with activity  Trust Relationship/Rapport:   care explained   choices provided   emotional support provided   empathic listening provided   questions answered   questions encouraged   reassurance provided   thoughts/feelings acknowledged  Taken 2/8/2025 0815 by Tammy Tubbs RN  Enhanced Safety Measures:    at bedside   room near unit station   pain management   review medications for side effects with activity  Taken 2/8/2025 0800 by Tammy Tubbs, RN  Trust Relationship/Rapport:   care explained   choices provided   emotional support provided   empathic listening provided   questions answered   questions encouraged   reassurance provided   thoughts/feelings acknowledged  Goal: Improved Attention and Thought Clarity  Outcome: Progressing  Intervention: Maximize Cognitive Function  Recent Flowsheet Documentation  Taken 2/8/2025 1600 by Tammy Tubbs, RN  Sensory Stimulation Regulation:   care clustered   lighting decreased   quiet environment promoted   television on  Reorientation Measures:   calendar in view   clock in view   familiar social contact encouraged   reorientation provided  Taken 2/8/2025 1200 by Tammy Tubbs, RN  Sensory Stimulation Regulation:   care clustered   lighting decreased   quiet environment promoted   television on  Reorientation Measures:   calendar in view   clock in view   familiar social contact encouraged   reorientation provided  Taken 2/8/2025 0815 by Tammy Tubbs, RN  Sensory Stimulation Regulation:   care clustered   lighting decreased   quiet environment promoted   television on  Reorientation Measures:   calendar in view   clock in view   familiar social contact encouraged   reorientation provided  Goal: Improved Sleep  Outcome: Progressing  Intervention: Promote Sleep  Recent Flowsheet  Documentation  Taken 2/8/2025 1600 by Tammy Tubbs RN  Sleep/Rest Enhancement:   comfort measures   natural light exposure provided   regular sleep/rest pattern promoted   relaxation techniques promoted  Taken 2/8/2025 1200 by Tammy Tubbs RN  Sleep/Rest Enhancement:   comfort measures   natural light exposure provided   regular sleep/rest pattern promoted   relaxation techniques promoted  Taken 2/8/2025 0800 by Tammy Tubbs RN  Sleep/Rest Enhancement:   comfort measures   natural light exposure provided   regular sleep/rest pattern promoted   relaxation techniques promoted     Problem: Comorbidity Management  Goal: Maintenance of COPD Symptom Control  Outcome: Progressing  Intervention: Maintain COPD Symptom Control  Recent Flowsheet Documentation  Taken 2/8/2025 1600 by Tammy Tubbs RN  Medication Review/Management:   medications reviewed   high-risk medications identified  Taken 2/8/2025 1200 by Tammy Tubbs RN  Medication Review/Management:   medications reviewed   high-risk medications identified  Taken 2/8/2025 0815 by Tammy Tubbs RN  Medication Review/Management:   medications reviewed   high-risk medications identified  Goal: Blood Glucose Levels Within Targeted Range  Outcome: Progressing  Intervention: Monitor and Manage Glycemia  Recent Flowsheet Documentation  Taken 2/8/2025 1600 by Tammy Tubbs RN  Medication Review/Management:   medications reviewed   high-risk medications identified  Taken 2/8/2025 1200 by aTmmy Tubbs RN  Medication Review/Management:   medications reviewed   high-risk medications identified  Taken 2/8/2025 0815 by Tammy Tubbs RN  Medication Review/Management:   medications reviewed   high-risk medications identified  Goal: Blood Pressure in Desired Range  Outcome: Progressing  Intervention: Maintain Blood Pressure Management  Recent Flowsheet Documentation  Taken 2/8/2025 1600 by Tammy Tubbs RN  Medication  Review/Management:   medications reviewed   high-risk medications identified  Taken 2/8/2025 1200 by Tammy Tubbs, RN  Medication Review/Management:   medications reviewed   high-risk medications identified  Taken 2/8/2025 0815 by Tammy Tubbs, RN  Medication Review/Management:   medications reviewed   high-risk medications identified

## 2025-02-09 NOTE — PROGRESS NOTES
Chart reviewed. Labs and vitals reviewed.   She has a daily assistance with dialysis/UF over last few days.  More than 11 L removed.  UF cut down to 2 L yesterday with hypotension.  No dialysis today.  Hemoglobin 6.9.  PRBC per primary team.  Plan for HD tomorrow.  EPO with dialysis.  2-3 L ultrafiltration as tolerated.    Recent Labs   Lab Test 02/09/25  1226 02/09/25  0745 02/09/25  0500 02/08/25  0756 02/08/25  0453   NA  --   --  136  --  136   POTASSIUM  --   --  4.3  --  4.9   CHLORIDE  --   --  98  --  97*   CO2  --   --  31*  --  29   ANIONGAP  --   --  7  --  10   * 157* 228*   < > 204*   BUN  --   --  66.1*  --  77.9*   CR  --   --  3.34*  --  3.96*   JODY  --   --  9.6  --  9.8    < > = values in this interval not displayed.       Davide Madrid MD  [facility] - Nephrology   [phone]

## 2025-02-10 ENCOUNTER — HOSPITAL ENCOUNTER (INPATIENT)
Dept: NEUROLOGY | Facility: CLINIC | Age: 75
Discharge: HOME | DRG: 207 | End: 2025-02-10
Attending: HOSPITALIST
Payer: COMMERCIAL

## 2025-02-10 ENCOUNTER — APPOINTMENT (OUTPATIENT)
Dept: CT IMAGING | Facility: CLINIC | Age: 75
DRG: 207 | End: 2025-02-10
Attending: HOSPITALIST
Payer: COMMERCIAL

## 2025-02-10 LAB
ANION GAP SERPL CALCULATED.3IONS-SCNC: 13 MMOL/L (ref 7–15)
ATRIAL RATE - MUSE: 78 BPM
BACTERIA UR CULT: NO GROWTH
BASE EXCESS BLDV CALC-SCNC: 8.7 MMOL/L (ref -3–3)
BUN SERPL-MCNC: 95.9 MG/DL (ref 8–23)
CALCIUM SERPL-MCNC: 9.9 MG/DL (ref 8.8–10.4)
CHLORIDE SERPL-SCNC: 100 MMOL/L (ref 98–107)
CREAT SERPL-MCNC: 4.48 MG/DL (ref 0.51–0.95)
DIASTOLIC BLOOD PRESSURE - MUSE: NORMAL MMHG
EGFRCR SERPLBLD CKD-EPI 2021: 10 ML/MIN/1.73M2
ERYTHROCYTE [DISTWIDTH] IN BLOOD BY AUTOMATED COUNT: 17.6 % (ref 10–15)
GLUCOSE BLDC GLUCOMTR-MCNC: 136 MG/DL (ref 70–99)
GLUCOSE BLDC GLUCOMTR-MCNC: 158 MG/DL (ref 70–99)
GLUCOSE BLDC GLUCOMTR-MCNC: 172 MG/DL (ref 70–99)
GLUCOSE BLDC GLUCOMTR-MCNC: 174 MG/DL (ref 70–99)
GLUCOSE BLDC GLUCOMTR-MCNC: 186 MG/DL (ref 70–99)
GLUCOSE BLDC GLUCOMTR-MCNC: 188 MG/DL (ref 70–99)
GLUCOSE SERPL-MCNC: 215 MG/DL (ref 70–99)
HCO3 BLDV-SCNC: 34 MMOL/L (ref 21–28)
HCO3 SERPL-SCNC: 27 MMOL/L (ref 22–29)
HCT VFR BLD AUTO: 27.3 % (ref 35–47)
HGB BLD-MCNC: 8.4 G/DL (ref 11.7–15.7)
INTERPRETATION ECG - MUSE: NORMAL
LACTATE SERPL-SCNC: 0.7 MMOL/L (ref 0.7–2)
MAGNESIUM SERPL-MCNC: 2.6 MG/DL (ref 1.7–2.3)
MCH RBC QN AUTO: 31.1 PG (ref 26.5–33)
MCHC RBC AUTO-ENTMCNC: 30.8 G/DL (ref 31.5–36.5)
MCV RBC AUTO: 101 FL (ref 78–100)
O2/TOTAL GAS SETTING VFR VENT: 2 %
OXYHGB MFR BLDV: 83 % (ref 70–75)
P AXIS - MUSE: 72 DEGREES
PCO2 BLDV: 49 MM HG (ref 40–50)
PH BLDV: 7.46 [PH] (ref 7.32–7.43)
PLATELET # BLD AUTO: 186 10E3/UL (ref 150–450)
PO2 BLDV: 48 MM HG (ref 25–47)
POTASSIUM SERPL-SCNC: 4.8 MMOL/L (ref 3.4–5.3)
PR INTERVAL - MUSE: 174 MS
QRS DURATION - MUSE: 86 MS
QT - MUSE: 392 MS
QTC - MUSE: 446 MS
R AXIS - MUSE: 92 DEGREES
RBC # BLD AUTO: 2.7 10E6/UL (ref 3.8–5.2)
SAO2 % BLDV: 84.6 % (ref 70–75)
SODIUM SERPL-SCNC: 140 MMOL/L (ref 135–145)
SYSTOLIC BLOOD PRESSURE - MUSE: NORMAL MMHG
T AXIS - MUSE: 71 DEGREES
VENTRICULAR RATE- MUSE: 78 BPM
WBC # BLD AUTO: 4.9 10E3/UL (ref 4–11)

## 2025-02-10 PROCEDURE — 250N000013 HC RX MED GY IP 250 OP 250 PS 637: Performed by: HOSPITALIST

## 2025-02-10 PROCEDURE — 258N000003 HC RX IP 258 OP 636: Performed by: INTERNAL MEDICINE

## 2025-02-10 PROCEDURE — 250N000009 HC RX 250: Performed by: HOSPITALIST

## 2025-02-10 PROCEDURE — 93005 ELECTROCARDIOGRAM TRACING: CPT

## 2025-02-10 PROCEDURE — 36415 COLL VENOUS BLD VENIPUNCTURE: CPT | Performed by: HOSPITALIST

## 2025-02-10 PROCEDURE — 99233 SBSQ HOSP IP/OBS HIGH 50: CPT | Performed by: HOSPITALIST

## 2025-02-10 PROCEDURE — 999N000157 HC STATISTIC RCP TIME EA 10 MIN

## 2025-02-10 PROCEDURE — 95816 EEG AWAKE AND DROWSY: CPT

## 2025-02-10 PROCEDURE — 250N000013 HC RX MED GY IP 250 OP 250 PS 637: Performed by: INTERNAL MEDICINE

## 2025-02-10 PROCEDURE — 90937 HEMODIALYSIS REPEATED EVAL: CPT

## 2025-02-10 PROCEDURE — 70450 CT HEAD/BRAIN W/O DYE: CPT

## 2025-02-10 PROCEDURE — 82565 ASSAY OF CREATININE: CPT | Performed by: HOSPITALIST

## 2025-02-10 PROCEDURE — 83605 ASSAY OF LACTIC ACID: CPT | Performed by: HOSPITALIST

## 2025-02-10 PROCEDURE — 80051 ELECTROLYTE PANEL: CPT | Performed by: HOSPITALIST

## 2025-02-10 PROCEDURE — 94640 AIRWAY INHALATION TREATMENT: CPT | Mod: 76

## 2025-02-10 PROCEDURE — 85027 COMPLETE CBC AUTOMATED: CPT | Performed by: INTERNAL MEDICINE

## 2025-02-10 PROCEDURE — 250N000011 HC RX IP 250 OP 636: Performed by: INTERNAL MEDICINE

## 2025-02-10 PROCEDURE — 94660 CPAP INITIATION&MGMT: CPT

## 2025-02-10 PROCEDURE — 3E043XZ INTRODUCTION OF VASOPRESSOR INTO CENTRAL VEIN, PERCUTANEOUS APPROACH: ICD-10-PCS | Performed by: INTERNAL MEDICINE

## 2025-02-10 PROCEDURE — 83735 ASSAY OF MAGNESIUM: CPT | Performed by: HOSPITALIST

## 2025-02-10 PROCEDURE — 93010 ELECTROCARDIOGRAM REPORT: CPT | Performed by: INTERNAL MEDICINE

## 2025-02-10 PROCEDURE — 82805 BLOOD GASES W/O2 SATURATION: CPT | Performed by: HOSPITALIST

## 2025-02-10 PROCEDURE — 94640 AIRWAY INHALATION TREATMENT: CPT

## 2025-02-10 PROCEDURE — 120N000013 HC R&B IMCU

## 2025-02-10 PROCEDURE — 99233 SBSQ HOSP IP/OBS HIGH 50: CPT | Performed by: INTERNAL MEDICINE

## 2025-02-10 RX ORDER — LEVALBUTEROL INHALATION SOLUTION 1.25 MG/3ML
1.25 SOLUTION RESPIRATORY (INHALATION)
Status: DISCONTINUED | OUTPATIENT
Start: 2025-02-10 | End: 2025-02-26 | Stop reason: HOSPADM

## 2025-02-10 RX ORDER — OLANZAPINE 5 MG/1
5 TABLET, ORALLY DISINTEGRATING ORAL ONCE
Status: COMPLETED | OUTPATIENT
Start: 2025-02-10 | End: 2025-02-10

## 2025-02-10 RX ADMIN — SODIUM CHLORIDE 500 ML: 9 INJECTION, SOLUTION INTRAVENOUS at 08:22

## 2025-02-10 RX ADMIN — AMIODARONE HYDROCHLORIDE 0.5 MG/MIN: 1.8 INJECTION, SOLUTION INTRAVENOUS at 14:57

## 2025-02-10 RX ADMIN — ACETAMINOPHEN 650 MG: 325 TABLET, FILM COATED ORAL at 13:04

## 2025-02-10 RX ADMIN — OLANZAPINE 5 MG: 5 TABLET, ORALLY DISINTEGRATING ORAL at 00:26

## 2025-02-10 RX ADMIN — BUDESONIDE 1 MG: 1 INHALANT ORAL at 20:31

## 2025-02-10 RX ADMIN — INSULIN GLARGINE 15 UNITS: 100 INJECTION, SOLUTION SUBCUTANEOUS at 20:46

## 2025-02-10 RX ADMIN — AMIODARONE HYDROCHLORIDE 200 MG: 200 TABLET ORAL at 07:43

## 2025-02-10 RX ADMIN — INSULIN ASPART 1 UNITS: 100 INJECTION, SOLUTION INTRAVENOUS; SUBCUTANEOUS at 07:59

## 2025-02-10 RX ADMIN — ATORVASTATIN CALCIUM 20 MG: 20 TABLET, FILM COATED ORAL at 19:56

## 2025-02-10 RX ADMIN — APIXABAN 5 MG: 5 TABLET, FILM COATED ORAL at 20:45

## 2025-02-10 RX ADMIN — MIDODRINE HYDROCHLORIDE 5 MG: 5 TABLET ORAL at 09:17

## 2025-02-10 RX ADMIN — LIDOCAINE HYDROCHLORIDE 1 ML: 10 INJECTION, SOLUTION EPIDURAL; INFILTRATION; INTRACAUDAL; PERINEURAL at 08:23

## 2025-02-10 RX ADMIN — INSULIN GLARGINE 15 UNITS: 100 INJECTION, SOLUTION SUBCUTANEOUS at 08:00

## 2025-02-10 RX ADMIN — IPRATROPIUM BROMIDE AND ALBUTEROL SULFATE 3 ML: .5; 3 SOLUTION RESPIRATORY (INHALATION) at 15:04

## 2025-02-10 RX ADMIN — Medication 60 ML: at 20:47

## 2025-02-10 RX ADMIN — Medication: at 08:22

## 2025-02-10 RX ADMIN — INSULIN ASPART 1 UNITS: 100 INJECTION, SOLUTION INTRAVENOUS; SUBCUTANEOUS at 12:55

## 2025-02-10 RX ADMIN — TACROLIMUS: 1 OINTMENT TOPICAL at 20:45

## 2025-02-10 RX ADMIN — TACROLIMUS: 1 OINTMENT TOPICAL at 07:42

## 2025-02-10 RX ADMIN — Medication 5 ML: at 12:46

## 2025-02-10 RX ADMIN — MICONAZOLE NITRATE: 2 POWDER TOPICAL at 20:45

## 2025-02-10 RX ADMIN — SERTRALINE HYDROCHLORIDE 75 MG: 50 TABLET ORAL at 19:56

## 2025-02-10 RX ADMIN — INSULIN ASPART 1 UNITS: 100 INJECTION, SOLUTION INTRAVENOUS; SUBCUTANEOUS at 16:41

## 2025-02-10 RX ADMIN — SODIUM CHLORIDE 200 ML: 9 INJECTION, SOLUTION INTRAVENOUS at 08:22

## 2025-02-10 RX ADMIN — QUETIAPINE FUMARATE 50 MG: 50 TABLET ORAL at 22:33

## 2025-02-10 RX ADMIN — MICONAZOLE NITRATE: 2 POWDER TOPICAL at 07:43

## 2025-02-10 RX ADMIN — PANTOPRAZOLE SODIUM 40 MG: 40 INJECTION, POWDER, FOR SOLUTION INTRAVENOUS at 06:35

## 2025-02-10 RX ADMIN — METOPROLOL TARTRATE 5 MG: 5 INJECTION INTRAVENOUS at 11:09

## 2025-02-10 RX ADMIN — Medication 50 MCG: at 12:46

## 2025-02-10 RX ADMIN — APIXABAN 5 MG: 5 TABLET, FILM COATED ORAL at 07:43

## 2025-02-10 RX ADMIN — IPRATROPIUM BROMIDE AND ALBUTEROL SULFATE 3 ML: .5; 3 SOLUTION RESPIRATORY (INHALATION) at 09:00

## 2025-02-10 RX ADMIN — SODIUM CHLORIDE 250 ML: 9 INJECTION, SOLUTION INTRAVENOUS at 08:22

## 2025-02-10 RX ADMIN — AMIODARONE HYDROCHLORIDE 150 MG: 1.5 INJECTION, SOLUTION INTRAVENOUS at 12:46

## 2025-02-10 RX ADMIN — IPRATROPIUM BROMIDE 0.5 MG: 0.5 SOLUTION RESPIRATORY (INHALATION) at 20:31

## 2025-02-10 RX ADMIN — Medication 60 ML: at 13:03

## 2025-02-10 RX ADMIN — LEVALBUTEROL HYDROCHLORIDE 1.25 MG: 1.25 SOLUTION RESPIRATORY (INHALATION) at 20:31

## 2025-02-10 RX ADMIN — INSULIN ASPART 1 UNITS: 100 INJECTION, SOLUTION INTRAVENOUS; SUBCUTANEOUS at 04:12

## 2025-02-10 RX ADMIN — IPRATROPIUM BROMIDE AND ALBUTEROL SULFATE 3 ML: .5; 3 SOLUTION RESPIRATORY (INHALATION) at 11:24

## 2025-02-10 RX ADMIN — CETIRIZINE HYDROCHLORIDE 5 MG: 5 TABLET ORAL at 22:06

## 2025-02-10 ASSESSMENT — ACTIVITIES OF DAILY LIVING (ADL)
ADLS_ACUITY_SCORE: 83
ADLS_ACUITY_SCORE: 81
ADLS_ACUITY_SCORE: 84
ADLS_ACUITY_SCORE: 88
ADLS_ACUITY_SCORE: 88
ADLS_ACUITY_SCORE: 84
ADLS_ACUITY_SCORE: 83
ADLS_ACUITY_SCORE: 81
ADLS_ACUITY_SCORE: 84
ADLS_ACUITY_SCORE: 81
ADLS_ACUITY_SCORE: 84
ADLS_ACUITY_SCORE: 84
ADLS_ACUITY_SCORE: 83
ADLS_ACUITY_SCORE: 83
ADLS_ACUITY_SCORE: 81
ADLS_ACUITY_SCORE: 84
ADLS_ACUITY_SCORE: 83
ADLS_ACUITY_SCORE: 81
ADLS_ACUITY_SCORE: 81
ADLS_ACUITY_SCORE: 83
ADLS_ACUITY_SCORE: 83
ADLS_ACUITY_SCORE: 81
ADLS_ACUITY_SCORE: 83

## 2025-02-10 NOTE — PROGRESS NOTES
[facility]    Medicine Progress Note - Hospitalist Service    Date of Admission:  1/8/2025    Assessment & Plan   Supriya Herr is a 74 year old female with PMHx significant for ESRD on HD, CHF, COPD, poor mobility (Lt AKA, Obesity, WC bound), DM type II, hypertension.  She was brought to the ER by EMS for evaluation of shortness of breath, diagnosed with influenza A and admitted on 1/8/25      Hospital course summary  Patient was admitted, subsequently intubated for worsening respiratory failure and remained on mechanical ventilator 1/9 - 1/18.  She was reintubated and remained on mechanical ventilator 1/20 - 1/25.  Was treated with Tamiflu, IV antibiotic, steroid, has completed courses.  Hospital course complicated by A-fib RVR.  Was on amiodarone drip with eventual transition to p.o. Also required diltiazem  for rate control.  Required BiPAP--HFNC--intermittent BiPAP use.  Ongoing tube feeding and modified diet per SLP.  Delirium managed with Seroquel.  Concern for recurrent pneumonia, hospital-acquired on 2/1, restarted on antibiotics.  ID consulted, recommended discontinuing antibiotics, respiratory failure likely related to fluid overload.    Patient developed symptomatic bradycardia on 2/5/25 triggering a RRT. She was placed on dopamine drip and transferred back to the ICU. Bradycardia was likely due to rate controlling medications. Cardiology consulted with adjustment of medications. Patient has remained in NSR without recurrence of bradycardia on amio drip. While in the ICU, patient required dialysis on pressors, now transitioned to midodrine . Hypoxic and hypercarbic resp failure is much improved with dialysis.     Patient transferred back to hospitalist service on 2/8/25.      Acute hypoxic respiratory failure  Influenza A pneumonia  Acute COPD exacerbation  Hospital-acquired pneumonia  Pharyngeal edema  -Initial presentation with shortness of breath, diagnosed influenza A.  Initial course as noted above.  -Had bronchoscopy and BAL 1/20, culture grew actinomyces, CT chest showed RML infiltrate versus atelectasis but no sign of actinomycosis per intensivist and felt this was likely colonization.    -Pneumonia treated with cefepime and vancomycin, cefepime changed to Levaquin and completed antibiotic course 1/26.  -Was treated with IV steroid and Tamiflu and completed the course  -2/1 given increased oxygen need during earlier course of hospital stay there was concern about infection again and blood cultures done on 2/1 have been negative and Pro-Geraldo was elevated and CT chest was concerning for right lower lobe pneumonia and ID was consulted and patient was again started on vancomycin and Levaquin  -ID was consulted and they recommended to stop antibiotics and respiratory failure was likely due to fluid overload and patient was improving with dialysis  -Continue nebs-budesonide 1 Mg twice daily, DuoNeb 4 times daily, as needed albuterol.      Recurrent atrial fibrillation with RVR   Acute on chronic CHFpEF  Hypertension  Shock, due to sedation-resolved  Symptomatic bradycardia-Resolved  -PTA is on amlodipine 5 Mg daily, Coreg 25 Mg p.o. twice daily and Lasix.  -TTE this is stay shows LVEF 60%, no pericardial effusion, no significant valvular disease, diastolic function indeterminate.  No WMA.  -Cardiology was following the patient and patient earlier was on amiodarone drip which was started on 1/27 for 14-day course and patient was also started on Coreg and dose was uptitrated to PTA dose of 25 twice daily on 2/2 along with Lipitor 20 mg and Eliquis 5 mg twice daily  -Patient developed symptomatic bradycardia on 2/5/25 triggering a RRT. She was placed on dopamine drip and transferred back to the ICU.  - Bradycardia was likely due to rate controlling medications, Coreg and dilt which were held and cardiology was re consulted and cardiology recommended to continue amiodarone and dose was  decreased to 200 mg daily and Coreg and diltiazem were discontinued and patient remained in normal sinus rhythm without bradycardia and cardiology signed off on   -Patient again developed atrial fibrillation with RVR and had episode of hypotension after dialysis and cardiology consulted and started on amiodarone drip after a bolus  - ORAL amiodarone stopped   - EP consult   - As per cards Should she become significantly bradycardic over course of the evening on amiodarone infusion then will need to consider dopamine / temp pacemaker until EP can weigh in.   -Continue with Eliquis 5 twice daily, atorvastatin 20 mg daily    Encephalopathy- waxing and waning  Anxiety  -Had CT head , negative for acute intracranial process.  Volume loss and chronic microvascular ischemic changes noted.  Suspected some baseline cognitive impairment.    -On p.m. of  patient had agitation and had to receive Zyprexa  -On my exam patient follows some commands and is aware of her date of birth and as per nursing staff as documented on  was AOx3 and also patient seem to have some jerky movements which are intermittent of the upper extremity  - per discussion with daughter the twitching has been present since she has been in the hospital  -CT scan of the head given that patient is on blood thinners  -EEG ordered  -Neurology consult  - Did see her later in the day and was saying things clearly and following some commands and told her , name of the president and daughter    -Continue Seroquel  -Continue PTA Zoloft and gabapentin.  -Delirium precautions.  -PT, OT eval.  -Social work consult for discharge planning.      Diabetes mellitus, type 2  -HbA1c 6.5% on 25.  -PTA is on Lantus 18 units daily and NovoLog 5 units 3 times daily.  -Currently on Lantus 15 units daily.  And sliding scale insulin  -Blood sugars fairly well controlled.  Monitor as diet is advanced and tube feedings are discontinued, will likely need to adjust  insulin regimen.  -Hypoglycemia protocol    ESRD on HD , wasTue-Thu-Sat, now MWF  Anemia of chronic disease  Hyperkalemia-resolved  -Nephrology following-managing dialysis needs.  -Daily renal panel.  -Hemoglobin 7-8, at baseline. And is 8.4  -Gets EPO with hemodialysis.  -Continue sevelamer and vitamin D3.  -Nephrology resumed Lasix on nondialysis days on 2/3.    -Discussed with Dr. Roman and patient underwent dialysis but had episode of hypotension and RVR as above and they also mention to DC the Lokelma    Constipation  -Noted on CT 2/2  -Continue the bowel regimen      Dysphagia  Suspect due to recurrent intubation.  -Dietitian consulted.  -Continue tube feedings for now.  -Did well with video swallow study on 1/31/25, diet advanced.  -continue cycling tube feeds.     Family communication  -I did  discuss plan of care in detail with patient's daughter Caroline [phone] at 342 pm and she had questions which were answered          Diet: Room Service  Combination Diet Pureed Diet (level 4); Thin Liquids (level 0) (upright, upper dentures in, straws OK)  Adult Formula Drip Feeding: Continuous [place] Standard 1.4; Nasoduodenal tube; Goal Rate: 55; mL/hr; From: 4:00 PM; To: 6:00 AM; OK to restart nocturnal cycle at goal rate of 55 ml/hr    DVT Prophylaxis: DOAC  Bradshaw Catheter: Not present  Lines: None     Cardiac Monitoring: None  Code Status: Full Code      Clinically Significant Risk Factors               # Hypoalbuminemia: Lowest albumin = 2.8 g/dL at 1/13/2025  5:14 AM, will monitor as appropriate     # Hypertension: Noted on problem list  # Chronic heart failure with preserved ejection fraction: heart failure noted on problem list and last echo with EF >50%    # Acute Hypercapnic Respiratory Failure: based on venous blood gas results.  Continue supplemental oxygen and ventilatory support as indicated.        # DMII: A1C = 6.5 % (Ref range: <5.7 %) within past 6 months   # Obesity: Estimated body mass index  "is 33.59 kg/m  as calculated from the following:    Height as of this encounter: 1.702 m (5' 7.01\").    Weight as of this encounter: 97.3 kg (214 lb 8.1 oz).      # Financial/Environmental Concerns: none         Social Drivers of Health    Tobacco Use: Medium Risk (9/11/2024)    Patient History     Smoking Tobacco Use: Former     Smokeless Tobacco Use: Never   Physical Activity: Insufficiently Active (7/19/2024)    Exercise Vital Sign     Days of Exercise per Week: 1 day     Minutes of Exercise per Session: 10 min   Social Connections: Unknown (7/19/2024)    Social Connection and Isolation Panel [NHANES]     Frequency of Social Gatherings with Friends and Family: More than three times a week          Disposition Plan     Medically Ready for Discharge: Anticipated in 2-4 Days, improvement in mental status, improvement in heart rate             Johanne Crowell MD  Hospitalist Service  [facility]  Securely message with [employer] (more info)  Text page via [employer] Paging/Directory     This note was completed in part using dictation via the Dragon voice recognition software. Some word and grammatical errors may occur and must be interpreted in the appropriate clinical context. If there are any questions pertaining to this issue, please contact me for further clarification.    Patient is critically ill and prognosis is guarded    ______________________________________________________________________    Interval History     Patient is new to me    -Reviewed events of overnight and as per nursing staff patient on 2/9 was AO x 3 but today is confused and overnight was agitated and needed Zyprexa  -Went into A-fib with RVR after dialysis and was hypotensive  -On exam patient seem to have some jerking movements which are very intermittent and not typical of seizure    Physical Exam   Vital Signs: Temp: 98.5  F (36.9  C) Temp src: Oral BP: (!) 173/57 Pulse: 70   Resp: 26 SpO2: 98 % O2 Device: BiPAP/CPAP " Oxygen Delivery: 2 LPM  Weight: 214 lbs 8.12 oz        General: AO x 1 to 2, follows commands and is able to tell me the date of birth  HEENT: Head is atraumatic, normocephalic.    Neck: Neck is supple    Respiratory:I did not appreciate any significant wheeze  Cardiovascular: Regular rate , S1 and S2 normal with no murmer or rubs or gallops  Abdomen:   soft , non tender , non distended and bowel sound present   Neurologic:No facial droop, follows some of the commands  Musculoskeletal: Normal Range of motion over upper and lower extremities bilaterally   Psychiatric: cooperative      Medical Decision Making       Spent in care of patient is 54 minutes and I reviewed labs including CBC and basic metabolic panel and discussed plan of care in detail with the patient, nursing staff, family, nephrology, reviewed notes from the consulting teams      Data     I have personally reviewed the following data over the past 24 hrs:    4.9  \   8.4 (L)   / 186     140 100 95.9 (H) /  188 (H)   4.8 27 4.48 (H) \       Imaging results reviewed over the past 24 hrs:   No results found for this or any previous visit (from the past 24 hours).

## 2025-02-10 NOTE — PLAN OF CARE
"Transferring to [facility] around 2300, report called to RN. Right internal jugular removed prior to transfer. Belongings sent with. Pt frequently pulling oxygen off, redirectable. Refusing cares at times.  Problem: Adult Inpatient Plan of Care  Goal: Plan of Care Review  Description: The Plan of Care Review/Shift note should be completed every shift.  The Outcome Evaluation is a brief statement about your assessment that the patient is improving, declining, or no change.  This information will be displayed automatically on your shift  note.  Outcome: Progressing  Flowsheets (Taken 2/10/2025 0012)  Overall Patient Progress: no change  Goal: Patient-Specific Goal (Individualized)  Description: You can add care plan individualizations to a care plan. Examples of Individualization might be:  \"Parent requests to be called daily at 9am for status\", \"I have a hard time hearing out of my right ear\", or \"Do not touch me to wake me up as it startles  me\".  Outcome: Progressing  Goal: Absence of Hospital-Acquired Illness or Injury  Outcome: Progressing  Intervention: Identify and Manage Fall Risk  Recent Flowsheet Documentation  Taken 2/9/2025 2000 by Fernando Diamond RN  Safety Promotion/Fall Prevention: activity supervised  Intervention: Prevent Skin Injury  Recent Flowsheet Documentation  Taken 2/9/2025 2200 by Fernando Diamond RN  Body Position:   turned   position changed independently   heels elevated   legs elevated   upper extremity elevated  Taken 2/9/2025 2000 by Fernando Diamond RN  Body Position:   position changed independently   heels elevated   refuses positioning   upper extremity elevated  Skin Protection:   adhesive use limited   incontinence pads utilized   pulse oximeter probe site changed   silicone foam dressing in place  Intervention: Prevent and Manage VTE (Venous Thromboembolism) Risk  Recent Flowsheet Documentation  Taken 2/9/2025 2000 by Fernando Diamond RN  VTE Prevention/Management: SCDs off " (sequential compression devices)  Intervention: Prevent Infection  Recent Flowsheet Documentation  Taken 2/9/2025 2000 by Fernando Diamond, RN  Infection Prevention:   cohorting utilized   environmental surveillance performed   equipment surfaces disinfected   hand hygiene promoted   personal protective equipment utilized   rest/sleep promoted   single patient room provided  Goal: Optimal Comfort and Wellbeing  Outcome: Progressing  Intervention: Provide Person-Centered Care  Recent Flowsheet Documentation  Taken 2/9/2025 2000 by Fernando Diamond, RN  Trust Relationship/Rapport:   care explained   choices provided   emotional support provided   empathic listening provided   questions answered   questions encouraged   reassurance provided   thoughts/feelings acknowledged  Goal: Readiness for Transition of Care  Outcome: Progressing   Goal Outcome Evaluation:           Overall Patient Progress: no changeOverall Patient Progress: no change

## 2025-02-10 NOTE — PLAN OF CARE
Date/Time: 2/9/25 2300-0730    Summary: 75 y/o F w/PMH of ESRD on HD, CHF, COPD, L AKA (w/c bound), DM2, & HTN. Brought in to ER via EMS on 1/8 for SOB; found to have influenza A, treated with tamiflu and antibiotics. Was intubated from 1/9-1/18 as well as 1/20-1/25 for respiratory failure, complicating her stay by being in and out of ICU. Was also found to have afib RVR, treated with amiodarone drip (now PO), as well as dilt drip for rate control. Pt then developed symptomatic bradycardia on 2/5, triggering RRT and readmission to ICU and placed on dopamine drip. Was also restarted on antibiotics 2/1 for pneumonia concerns. Remains on tube feeding at this time, as well as intermittent BiPAP use.  Orientation: A&Ox1; waxes and wanes.   Behavior & Aggression: yellow; Pt agitated after arriving to floor, yelling, panicking, and pulling at lines. Tube found to be leaking at one point because it had been loosened while pt was pulling on NG. MD paged and 5 mg oral zyprexa ordered and given. Pt continues to have 1:1 sit.  Activity: Ax2, lift; q2 turns, has wedges and boot to R foot.  Diet: pureed, thin liquids. Also has tube feed running at 55 mL/hr. Is cycled from 4p-6a daily, but was no started until 6p on 2/9; can be stopped at 8a on 2/10. Pills can be given via NG tube; pt did not tolerate swallowing them per ICU nurse report.  Fall risk: yes  Isolation: N/A  Pain:  mg tylenol given for pain in R leg.   B&B: incontinent of bowel; pt oliguric, on HD.  VS/O2: hypertensive at times, specifically when agitated; cuff on R arm due to fistula in LUE. Desats when agitated, but was able to place pt on BiPAP.  IV: R PIV, SL  Drains/Devices: NG tube, secured w/bridle.   Tele: NSR  Abnormal Labs: q4 BG checks, needed 1 unit of coverage; insulin in cupboard in room.  Skin: L AKA, scattered scabs and bruising, rash/redness to groin and under breasts, LUE fistula, redness/excoriation to coccyx (mepi in place). Skin overall  dry/flaky.  Consults/Procedures: N/A  D/C Date: TBD  Other: pt daughter, Caroline Gray, involved w/care. Will have HD on 2/10.

## 2025-02-10 NOTE — PROGRESS NOTES
[facility]    Medicine Progress Note - Hospitalist Service    Date of Admission:  1/8/2025    Assessment & Plan   Supriya Herr is a 74 year old female with PMHx significant for ESRD on HD, CHF, COPD, poor mobility (Lt AKA, Obesity, WC bound), DM type II, hypertension.  She was brought to the ER by EMS for evaluation of shortness of breath, diagnosed with influenza A and admitted on 1/8/25      Hospital course summary  Patient was admitted, subsequently intubated for worsening respiratory failure and remained on mechanical ventilator 1/9 - 1/18.  She was reintubated and remained on mechanical ventilator 1/20 - 1/25.  Was treated with Tamiflu, IV antibiotic, steroid, has completed courses.  Hospital course complicated by A-fib RVR.  Was on amiodarone drip with eventual transition to p.o. Also required diltiazem  for rate control.  Required BiPAP--HFNC--intermittent BiPAP use.  Ongoing tube feeding and modified diet per SLP.  Delirium managed with Seroquel.  Concern for recurrent pneumonia, hospital-acquired on 2/1, restarted on antibiotics.  ID consulted, recommended discontinuing antibiotics, respiratory failure likely related to fluid overload.    Patient developed symptomatic bradycardia on 2/5/25 triggering a RRT. She was placed on dopamine drip and transferred back to the ICU. Bradycardia was likely due to rate controlling medications. Cardiology consulted with adjustment of medications. Patient has remained in NSR without recurrence of bradycardia on amio drip. While in the ICU, patient required dialysis on pressors, now transitioned to midodrine . Hypoxic and hypercarbic resp failure is much improved with dialysis.     Patient transferred back to hospitalist service on 2/8/25.      Acute hypoxic respiratory failure  Influenza A pneumonia  Acute COPD exacerbation  Hospital-acquired pneumonia  Pharyngeal edema  Initial presentation with shortness of breath, diagnosed influenza A.  Initial course as noted above.  -Completed Tamiflu.  -Had bronchoscopy and BAL 1/20, culture grew actinomyces, CT chest showed RML infiltrate versus atelectasis but no sign of actinomycosis per intensivist and felt this was likely colonization.  Pneumonia treated with cefepime and vancomycin, cefepime changed to Levaquin and completed antibiotic course 1/26.  -Was treated with IV steroid, completed course.  -Continue nebs-budesonide 1 Mg twice daily, DuoNeb 4 times daily, as needed albuterol.  -Weaned down to oxymask on 1/30/25.  Still requiring intermittent BiPAP.    -2/1: Increased hypoxia and shortness of breath on the morning of 2/1/25.  WBC within normal limits.  Afebrile.  VBG OK.  -Concern for RUL pneumonia, started empiric antibiotics-vancomycin and levofloxacin.  Repeat blood cultures on 2/1 NGTD.  Procalcitonin returned elevated at 3.28 on 2/2.  However no prior baseline this hospitalization. CT chest on 2/3 also concerning for RUL pneumonia.  -Consulted ID given recurrent pneumonia, has had multiple antibiotics this hospitalization.  Appreciate their evaluation.  They recommended discontinuing antibiotics, respiratory failure at this time more likely related to fluid overload.   - hypoxia now improving with aggressive fluid removal with dialysis.       Atrial fibrillation with RVR  Acute on chronic CHFpEF  Hypertension  Shock, due to sedation  Symptomatic bradycar  PTA is on amlodipine 5 Mg daily, Coreg 25 Mg p.o. twice daily and Lasix.  -Was started on amiodarone drip, transitioned to p.o. on 1/27/25 with plan for a 14 day course.  -Managed with diltiazem drip.  This was transitioned to p.o. diltiazem on 2/3.  -Resumed PTA carvedilol at a lower dose on 1/31/25, increased back to PTA dosing of 25 Mg twice daily on 2/2  -Continue with Lipitor 20 Mg daily.  -On apixaban 5 Mg twice daily.  -TTE this is stay shows LVEF 60%, no pericardial effusion, no significant valvular disease, diastolic function  indeterminate.  No WMA.  -Cardiology followed previously, signed off 1/21/25.  Consider reconsulting if rate remains difficult to control after resuming and titrating up carvedilol.  -Monitor daily weight and intake and output as able.  -Patient developed symptomatic bradycardia on 2/5/25 triggering a RRT. She was placed on dopamine drip and transferred back to the ICU. Bradycardia was likely due to rate controlling medications, Coreg and dilt which are now on hold. Cardiology consulted with adjustment of medications. Patient has remained in NSR without recurrence of bradycardia while on amiodarone.  - continue holding carvedilol and diltiazem.  - continue PO amiodarone at 20mmg daily    Diabetes mellitus, type 2  HbA1c 6.5% on 1/17/25.  PTA is on Lantus 18 units daily and NovoLog 5 units 3 times daily.  -Currently on Lantus 15 units daily.  -ISS.  -Blood sugars fairly well controlled.  Monitor as diet is advanced and tube feedings are discontinued, will likely need to adjust insulin regimen.    ESRD on HD , wasTue-u-Sat, now [facility]  Anemia of chronic disease  Hyperkalemia  -Nephrology following-managing dialysis needs  -Next dialysis on 2/4.  Apparently patient was dyspneic even after dialysis the previous night and needed to be put back on BiPAP.  -Daily renal panel.  -Hemoglobin 7-8, at baseline.  -Gets EPO with hemodialysis.  -Continue sevelamer and vitamin D3.  -Nephrology resumed Lasix on nondialysis days on 2/3.  Also giving Lokelma for hyperkalemia.      Constipation  -Noted on CT 2/2  -Placed on bowel regimen    Encephalopathy, improved  Anxiety  Had CT head 1/20, negative for acute intracranial process.  Volume loss and chronic microvascular ischemic changes noted.  Suspected some baseline cognitive impairment.  No agitation.  -Continue Seroquel 25 Mg every morning and 50 Mg every afternoon.  -Continue PTA Zoloft and gabapentin.  -Delirium precautions.  -PT, OT eval.  -Social work consult for discharge  "planning.    Dysphagia  Suspect due to recurrent intubation.  -Dietitian consulted.  -Continue tube feedings for now.  -Did well with video swallow study on 1/31/25, diet advanced.  -will start cycling tube feeds.           Diet: Room Service  Combination Diet Pureed Diet (level 4); Thin Liquids (level 0) (upright, upper dentures in, straws OK)  Adult Formula Drip Feeding: Continuous [employer] Standard 1.4; Nasoduodenal tube; Goal Rate: 55; mL/hr; From: 4:00 PM; To: 6:00 AM; OK to restart nocturnal cycle at goal rate of 55 ml/hr    DVT Prophylaxis: DOAC  Bradshaw Catheter: Not present  Lines: PRESENT      CVC Triple Lumen Right Internal jugular-Site Assessment: WDL  Hemodialysis Vascular Access AV fistula Superior Arm-Site Assessment: WDL      Cardiac Monitoring: None  Code Status: Full Code      Clinically Significant Risk Factors          # Hypochloremia: Lowest Cl = 97 mmol/L in last 2 days, will monitor as appropriate      # Hypoalbuminemia: Lowest albumin = 2.8 g/dL at 1/13/2025  5:14 AM, will monitor as appropriate     # Hypertension: Noted on problem list    # Chronic heart failure with preserved ejection fraction: heart failure noted on problem list and last echo with EF >50%    # Acute Hypercapnic Respiratory Failure: based on venous blood gas results.  Continue supplemental oxygen and ventilatory support as indicated.        # DMII: A1C = 6.5 % (Ref range: <5.7 %) within past 6 months   # Obesity: Estimated body mass index is 31.72 kg/m  as calculated from the following:    Height as of this encounter: 1.702 m (5' 7.01\").    Weight as of this encounter: 91.9 kg (202 lb 9.6 oz).        # Financial/Environmental Concerns: none         Social Drivers of Health    Tobacco Use: Medium Risk (9/11/2024)    Patient History     Smoking Tobacco Use: Former     Smokeless Tobacco Use: Never   Physical Activity: Insufficiently Active (7/19/2024)    Exercise Vital Sign     Days of Exercise per Week: 1 day     Minutes of " Exercise per Session: 10 min   Social Connections: Unknown (7/19/2024)    Social Connection and Isolation Panel [NHANES]     Frequency of Social Gatherings with Friends and Family: More than three times a week          Disposition Plan     Medically Ready for Discharge: Anticipated in 2-4 Days pending improvement in respiratory status             Vivian Wood MD  Hospitalist Service  [facility]  Securely message with [employer] (more info)  Text page via [employer] Paging/Directory   ______________________________________________________________________    Interval History   Patient transferred out of the ICU. She continues to improve respiratory wise. States her breathing ins much better. Denies any pain    Physical Exam   Vital Signs: Temp: 99.2  F (37.3  C) Temp src: Oral BP: (!) 115/105 Pulse: 79   Resp: 22 SpO2: 93 % O2 Device: Nasal cannula Oxygen Delivery: 2 LPM  Weight: 202 lbs 9.64 oz    General Appearance: Well appearing for stated age.  Respiratory: CTAB, no rales or ronchi  Cardiovascular: S1, S2 normal, no murmurs  GI: non-tender on palpation, BS present      Medical Decision Making       55 MINUTES SPENT BY ME on the date of service doing chart review, history, exam, documentation & further activities per the note.      Data     I have personally reviewed the following data over the past 24 hrs:    3.9 (L)  \   8.8 (L)   / 169     136 98 66.1 (H) /  119 (H)   4.3 31 (H) 3.34 (H) \       Imaging results reviewed over the past 24 hrs:   No results found for this or any previous visit (from the past 24 hours).

## 2025-02-10 NOTE — PROVIDER NOTIFICATION
MD Notification    Notified Person: MD    Notified Person Name: Ghazal Bermudez    Notification Date/Time: 2/10/25 0007    Notification Interaction: Vocera    Purpose of Notification: Pt had evening seroquel and PRN melatonin already, as well as tylenol for pain. Still very agitated, yelling in room and disregarding other interventions. Pulling at lines. Is there anything else she can have to help settle her?    Orders Received: one time does of 5 mg zyprexa ordered and given.    Comments:

## 2025-02-10 NOTE — PLAN OF CARE
"Patient Name: Geronimo  MRN: [id number]  Date of Admission: 1/8/2025  Reason for Admission: SOB   Level of Care: [facility]     Vitals:   BP Readings from Last 1 Encounters:   02/10/25 137/75     Pulse Readings from Last 1 Encounters:   02/10/25 (!) 152     Wt Readings from Last 1 Encounters:   02/09/25 97.3 kg (214 lb 8.1 oz)     Ht Readings from Last 1 Encounters:   02/04/25 1.702 m (5' 7.01\")     Estimated body mass index is 33.59 kg/m  as calculated from the following:    Height as of this encounter: 1.702 m (5' 7.01\").    Weight as of this encounter: 97.3 kg (214 lb 8.1 oz).  Temp Readings from Last 1 Encounters:   02/10/25 98.7  F (37.1  C) (Oral)   Pain: Pain goal 0/10 Pain Rating states yes or no and can state location, number pain scale difficult for pt. reporting back pain, also utilizing non verbal pain scale Effective pain medication/regimen tylenol     Assessment  Resp: LS diminished, on 30% bipap, 2 L when awake     Telemetry: NSR, 1044 during dialysis went into Afib -140's, max , PRN IV metoprolol given, converted back to NSR around 2230   Neuro: A&Oxself and occ place, orientation inconsistent. Pt can become agitated and anxious with cares   GI/: BS +, x2 BM, Pt on hemodialysis, no urine output throughout shift   Skin/Wounds: blanchable redness on coccyx mepi in place, bilat breast and maciel folds - medications applied per order. L AKA. R foot wound care done, boot in place   Lines/Drains: R arm fistula, R PIV with amio running at 0.5 mg/min, NJ at 95 CM TF running at goal   Activity: q2h turn, occ hand/body twitches - MD aware - EEG and neuro consult placed   Sleep: intermittent sleep between cares     Aggression Stop Light: Green  Patient Care Plan: amio overnight until EP completes consult 2/11, tube feed started late, run until 0830 2/11 AM. Head CT and EEG done   Goal Outcome Evaluation:    Plan of Care Reviewed With: patient  Overall Patient Progress: no change  Outcome Evaluation: " pt went into afib RVR 1044 during diaylsis run, PRN IV metoprolol given

## 2025-02-10 NOTE — PROGRESS NOTES
Potassium   Date Value Ref Range Status   02/10/2025 4.8 3.4 - 5.3 mmol/L Final   02/02/2022 4.7 3.4 - 5.3 mmol/L Final   04/17/2021 4.2 3.4 - 5.3 mmol/L Final     Hemoglobin   Date Value Ref Range Status   02/10/2025 8.4 (L) 11.7 - 15.7 g/dL Final   04/16/2021 10.9 (L) 11.7 - 15.7 g/dL Final     Creatinine   Date Value Ref Range Status   02/10/2025 4.48 (H) 0.51 - 0.95 mg/dL Final   04/17/2021 5.98 (H) 0.52 - 1.04 mg/dL Final     Urea Nitrogen   Date Value Ref Range Status   02/10/2025 95.9 (H) 8.0 - 23.0 mg/dL Final   11/24/2021 37 (H) 7 - 30 mg/dL Final   04/17/2021 68 (H) 7 - 30 mg/dL Final     Sodium   Date Value Ref Range Status   02/10/2025 140 135 - 145 mmol/L Final   04/17/2021 137 133 - 144 mmol/L Final     INR   Date Value Ref Range Status   04/24/2024 1.11 0.85 - 1.15 Final   04/16/2021 1.14 0.86 - 1.14 Final       DIALYSIS PROCEDURE NOTE  Hepatitis status of previous patient on machine log was checked and verified ok to use with this patients hepatitis status.  Patient dialyzed for 3:15 hrs. on a K2 bath with a net fluid removal of  1.2L.  A BFR of 450 ml/min was obtained via a left AVF using 15 gauge needles.      The treatment plan was discussed with Dr. Roman during the treatment.    Total heparin received during the treatment: 0 units.   Needle cannulation sites held x 10 min.       Meds  given: none   Complications: Atrial fib the last 1 of treatment- UF goal decreased and the UF off- IV metoprolol was   given by the floor RN- remains in afib and symptomatic- off treatment 12 mins early    Person educated: patient . Knowledge base substantial. Barriers to learning: none. Educated on procedure via verbal mode. The patient/family verbalized understanding. Pt prefers verbal education style.     ICEBOAT? Timeout performed pre-treatment  I: Patient was identified using 2 identifiers  C:  Consent Signed Yes  E: Equipment preventative maintenance is current and dialysis delivery system OK to use  B:  Hepatitis B Surface Antigen: negaive Draw Date: 02/06/25      Hepatitis B Surface Antibody: susceptible; Draw Date: 01/09/25  O: Dialysis orders present and complete prior to treatment  A: Vascular access verified and assessed prior to treatment  T: Treatment was performed at a clinically appropriate time  ?: Patient was allowed to ask questions and address concerns prior to treatment  See Adult Hemodialysis flowsheet in EPIC for further details and post assessment.  Machine water alarm in place and functioning. Transducer pods intact and checked every 15min. .  Chlorine/Chloramine water system checked every 4 hours.      Patient repositioned every 2 hours during the treatment.  Post treatment report given to SHANTA Mahmood regarding 1.2L of fluid removed, last BP of 101/61, and patient pain rating of 0/10.     Please remove patient dressing on AVF and AVG needle sites 24 hours after dialysis. If leaking occurs please apply a Band-Aid.

## 2025-02-10 NOTE — PROGRESS NOTES
[facility]             Assessment/Plan:     ESKD: Regular dialysis is TRS with Dr. Basilio. CLEM.   *given how she did today I am NOT scheduling dialysis for tomorrow. Will monitor    2. Volume: She has been UF'd while here and over the past several days she has been negative about 2.5 net given her obligatory intake. Became quite hypotensive over the weekend and today suggesting that we near or at EDW. Will assess tomorrow.     3. SOB: inlufenza positive plus Afib. This likely is the largest contributor to her SOB    4. Afib: Rate control is going to be an issue. She was bradycardic on coreg. Metoprolol has fewer blood pressure issues but dialyzes off so can result in RVR during HD. Cards help appreciated. May need to consider digoxin which can be very successful at maintining rate control without bradycardia but will await cards input to see if they feel she is better off with ablation/pacer.    Attestation:   I have reviewed today's relevant vital signs, notes, medications, labs and imaging. 55 min was spent reviewing this data and conveying plan to the team.     Alysia Roman MD MS   [employer]      Interval History:     Seen at the end of dialysis. Needed to progressively reduce the amount of fluid removed due to hypotension. This is also what was encessary over the weekend. Towards the end of her run she also went back into afib with RVR and heart rates of 130-150. Metoprolol was given per primary team with little impact. She was actuely SOB during this time period. She has been on amiodarone for rhythm control. Previously was on coreg but developed bradycardia.   Having some ab pain and chest pain when in RVR. Also some nausea.           Data:     CBC RESULTS:     Recent Labs   Lab 02/10/25  0526 02/09/25  1807 02/09/25  0500 02/08/25  0801 02/07/25  0546 02/06/25  1020 02/05/25  1736   WBC 4.9  --  3.9* 5.5 6.2 9.2 6.5   RBC 2.70*  --  2.19* 2.30* 2.33* 2.40* 2.25*  "  HGB 8.4* 8.8* 6.9* 7.4* 7.4*  7.4* 7.7* 7.2*   HCT 27.3*  --  22.6* 23.7* 24.2* 25.0* 23.5*     --  169 173 210 199 161     Basic Metabolic Panel:  Recent Labs   Lab 02/10/25  1544 02/10/25  1226 02/10/25  0742 02/10/25  0526 02/10/25  0406 02/09/25  2341 02/09/25  0745 02/09/25  0500 02/08/25  0756 02/08/25  0453 02/07/25  0548 02/07/25  0546 02/07/25  0013 02/06/25  2048 02/06/25  0958 02/06/25  0836 02/06/25  0429 02/06/25  0416   NA  --   --   --  140  --   --   --  136  --  136  --  135  --   --   --  134*  --  133*   POTASSIUM  --   --   --  4.8  --   --   --  4.3  --  4.9  --  4.3  --  4.0  --  5.7*  --  5.4*  5.4*   CHLORIDE  --   --   --  100  --   --   --  98  --  97*  --  96*  --   --   --  94*  --  94*   CO2  --   --   --  27  --   --   --  31*  --  29  --  29  --   --   --  29  --  28   BUN  --   --   --  95.9*  --   --   --  66.1*  --  77.9*  --  49.8*  --   --   --  77.9*  --  76.6*   CR  --   --   --  4.48*  --   --   --  3.34*  --  3.96*  --  2.79*  --   --   --  3.95*  --  3.88*   * 172* 188* 215* 186* 158*   < > 228*   < > 204*   < > 150*   < > 86   < > 77   < > 233*   JODY  --   --   --  9.9  --   --   --  9.6  --  9.8  --  10.0  --   --   --  9.9  --  9.8    < > = values in this interval not displayed.     INRNo lab results found in last 7 days.          Physical Exam:     Vitals were reviewed     , Blood pressure 128/66, pulse (!) 140, temperature 98.7  F (37.1  C), temperature source Oral, resp. rate 26, height 1.702 m (5' 7.01\"), weight 97.3 kg (214 lb 8.1 oz), SpO2 95%, not currently breastfeeding.  Wt Readings from Last 3 Encounters:   02/09/25 97.3 kg (214 lb 8.1 oz)   06/14/24 101.1 kg (222 lb 14.2 oz)   06/03/24 101.1 kg (222 lb 14.2 oz)     Intake/Output Summary (Last 24 hours) at 2/10/2025 2220  Last data filed at 2/10/2025 1600  Gross per 24 hour   Intake 927 ml   Output 1200 ml   Net -273 ml     Gen; Comfortable. NAD. Alert and oriented X 3  Eyes; pupils are " reactive. Non icteric  Mouth: no oral lesions  Neck: supple with no adenopathy  Resp: CTA anterior and posterior  Card: irregular, tachycardic no rub   Abd: soft,  non tender. Good bowel sounds  Ext: no edema  Neuro: no asterixis or myoclonus  Skin; no rashes or lesions  Access: L AVF with good thrill and bruit. L hand is warm.          Medications and Allergies:     Current Facility-Administered Medications   Medication Dose Route Frequency Provider Last Rate Last Admin    - MEDICATION INSTRUCTIONS for Dialysis Patients -   Does not apply See Admin Instructions Braden Montana MD        apixaban ANTICOAGULANT (ELIQUIS) tablet 5 mg  5 mg Oral or Feeding Tube BID Denver Shipman MD   5 mg at 02/10/25 0743    atorvastatin (LIPITOR) tablet 20 mg  20 mg Oral or Feeding Tube QPM Denver Shipman MD   20 mg at 02/09/25 1955    B and C vitamin Complex with folic acid (NEPHRONEX) liquid 5 mL  5 mL Per Feeding Tube Daily Denver Shipman MD   5 mL at 02/10/25 1246    budesonide (PULMICORT) neb solution 1 mg  1 mg Nebulization BID Denver Shipman MD   1 mg at 02/09/25 1924    cetirizine (zyrTEC) tablet 5 mg  5 mg Oral or Feeding Tube At Bedtime Denver Shipman MD   5 mg at 02/09/25 2118    furosemide (LASIX) tablet 80 mg  80 mg Oral or Feeding Tube Daily Enu-Vivian Samaniego MD   80 mg at 02/09/25 0803    heparin lock flush 10 unit/mL injection 5-20 mL  5-20 mL Intracatheter Q24H Liset Romero MD   5 mL at 02/07/25 1208    insulin aspart (NovoLOG) injection (RAPID ACTING)  1-4 Units Subcutaneous Q4H Eren Mandel MD   1 Units at 02/10/25 1641    insulin glargine (LANTUS PEN) injection 15 Units  15 Units Subcutaneous BID Denver Shipman MD   15 Units at 02/10/25 0800    ipratropium (ATROVENT) 0.02 % neb solution 0.5 mg  0.5 mg Nebulization 4x daily Johanne Crowell MD        levalbuterol (XOPENEX) neb solution 1.25 mg  1.25 mg Nebulization 4x daily Johanne Crowell MD        miconazole (MICATIN) 2 %  powder   Topical BID Denver Shipman MD   Given at 02/10/25 0743    midodrine (PROAMATINE) tablet 5 mg  5 mg Oral Once per day on Monday Wednesday Friday Vivian Russell MD   5 mg at 02/10/25 0917    pantoprazole (PROTONIX) 2 mg/mL suspension 40 mg  40 mg Per Feeding Tube QAM  Denver Shipman MD   40 mg at 02/09/25 0803    Or    pantoprazole (PROTONIX) IV push injection 40 mg  40 mg Intravenous QAM  Denver Shipman MD   40 mg at 02/10/25 0635    polyethylene glycol (MIRALAX) Packet 17 g  17 g Oral or Feeding Tube Daily Liset Romero MD   17 g at 02/06/25 0828    Prosource TF20 ENfit Compatibl EN LIQD (PROSOURCE TF20) packet 60 mL  1 packet Per Feeding Tube BID Vivian Russell MD   60 mL at 02/10/25 1303    QUEtiapine (SEROquel) tablet 50 mg  50 mg Oral or Feeding Tube At Bedtime Cheo Watt MD   50 mg at 02/09/25 2118    sennosides (SENOKOT) syrup 10 mL  10 mL Oral or Feeding Tube BID Liset Romero MD   10 mL at 02/09/25 0803    sertraline (ZOLOFT) tablet 75 mg  75 mg Oral or Feeding Tube QPM Denver Shipman MD   75 mg at 02/09/25 1955    [Held by provider] sevelamer carbonate (RENVELA) tablet 800 mg  800 mg Oral TID w/meals Denver Shipman MD   800 mg at 02/06/25 0828    tacrolimus (PROTOPIC) 0.1 % ointment   Topical BID Denver Shipman MD   Given at 02/10/25 0742    vitamin D3 (CHOLECALCIFEROL) tablet 50 mcg  50 mcg Oral or Feeding Tube Daily Denver Shipman MD   50 mcg at 02/10/25 1246     Allergies   Allergen Reactions    Ampicillin-Sulbactam Sodium Rash     No evidence SJS, but very uncomfortable and precipitated multiple provider visits. Would not use penicillins again if other options available.     Penicillins Rash

## 2025-02-10 NOTE — PLAN OF CARE
"Oxygen: Intermittent NC 2L to Bipap   Continuous Drips: SL  RASS: 0  Pain/CPOT: headache this evening, tylenol given.   Mobility: NOOBY.   Lines: R internal jugular CVC, x1 PIV  : Oliguric. HD planned for tomorrow.   Skin: Mepilex is on  Diet/Bowel: Last BM today  Events: Patient alert and oriented intermittently. SR. LS diminished. X1 stool. Patient having productive cough. Sputum sample sent. Plan to transfer to [facility] when bed becomes available.   Social: Caroline Gray, Daughter, at bedside and updated.      Problem: Adult Inpatient Plan of Care  Goal: Plan of Care Review  Description: The Plan of Care Review/Shift note should be completed every shift.  The Outcome Evaluation is a brief statement about your assessment that the patient is improving, declining, or no change.  This information will be displayed automatically on your shift  note.  Outcome: Progressing  Goal: Patient-Specific Goal (Individualized)  Description: You can add care plan individualizations to a care plan. Examples of Individualization might be:  \"Parent requests to be called daily at 9am for status\", \"I have a hard time hearing out of my right ear\", or \"Do not touch me to wake me up as it startles  me\".  Outcome: Progressing  Goal: Absence of Hospital-Acquired Illness or Injury  Outcome: Progressing  Intervention: Identify and Manage Fall Risk  Recent Flowsheet Documentation  Taken 2/9/2025 1600 by Tammy Tubbs RN  Safety Promotion/Fall Prevention: activity supervised  Taken 2/9/2025 1200 by Tammy Tubbs RN  Safety Promotion/Fall Prevention: activity supervised  Taken 2/9/2025 0800 by Tammy Tubbs RN  Safety Promotion/Fall Prevention: activity supervised  Intervention: Prevent Skin Injury  Recent Flowsheet Documentation  Taken 2/9/2025 1800 by Tammy Tubbs RN  Body Position:   turned   side-lying   heels elevated  Taken 2/9/2025 1600 by Tammy Tubbs RN  Body Position:   turned   side-lying   heels elevated  Skin " Protection:   adhesive use limited   incontinence pads utilized   pulse oximeter probe site changed   silicone foam dressing in place  Taken 2/9/2025 1200 by Tammy Tubbs RN  Body Position:   turned   side-lying   heels elevated  Skin Protection:   adhesive use limited   incontinence pads utilized   pulse oximeter probe site changed   silicone foam dressing in place  Taken 2/9/2025 0800 by Tammy Tubbs RN  Body Position:   turned   side-lying   heels elevated  Skin Protection:   adhesive use limited   incontinence pads utilized   pulse oximeter probe site changed   silicone foam dressing in place  Intervention: Prevent and Manage VTE (Venous Thromboembolism) Risk  Recent Flowsheet Documentation  Taken 2/9/2025 1600 by Tammy Tubbs RN  VTE Prevention/Management: SCDs off (sequential compression devices)  Taken 2/9/2025 1200 by Tammy Tubbs RN  VTE Prevention/Management: SCDs off (sequential compression devices)  Taken 2/9/2025 0800 by Tammy Tubbs RN  VTE Prevention/Management: SCDs off (sequential compression devices)  Intervention: Prevent Infection  Recent Flowsheet Documentation  Taken 2/9/2025 1600 by Tammy Tubbs RN  Infection Prevention:   cohorting utilized   environmental surveillance performed   equipment surfaces disinfected   hand hygiene promoted   personal protective equipment utilized   rest/sleep promoted   single patient room provided  Taken 2/9/2025 1200 by Tammy Tubbs RN  Infection Prevention:   cohorting utilized   environmental surveillance performed   equipment surfaces disinfected   hand hygiene promoted   personal protective equipment utilized   rest/sleep promoted   single patient room provided  Taken 2/9/2025 0800 by Tammy Tubbs RN  Infection Prevention:   cohorting utilized   environmental surveillance performed   equipment surfaces disinfected   hand hygiene promoted   personal protective equipment utilized   rest/sleep promoted    single patient room provided  Goal: Optimal Comfort and Wellbeing  Outcome: Progressing  Intervention: Provide Person-Centered Care  Recent Flowsheet Documentation  Taken 2/9/2025 1600 by Tammy Tubbs RN  Trust Relationship/Rapport:   care explained   choices provided   emotional support provided   empathic listening provided   questions answered   questions encouraged   reassurance provided   thoughts/feelings acknowledged  Taken 2/9/2025 1200 by Tammy Tubbs RN  Trust Relationship/Rapport:   care explained   choices provided   emotional support provided   empathic listening provided   questions answered   questions encouraged   reassurance provided   thoughts/feelings acknowledged  Taken 2/9/2025 0800 by Tammy Tubbs, SHANTA  Trust Relationship/Rapport:   care explained   choices provided   emotional support provided   empathic listening provided   questions answered   questions encouraged   reassurance provided   thoughts/feelings acknowledged  Goal: Readiness for Transition of Care  Outcome: Progressing     Problem: Fall Injury Risk  Goal: Absence of Fall and Fall-Related Injury  Outcome: Progressing  Intervention: Identify and Manage Contributors  Recent Flowsheet Documentation  Taken 2/9/2025 1600 by Tammy Tubbs RN  Self-Care Promotion: meal set-up provided  Medication Review/Management:   medications reviewed   high-risk medications identified  Taken 2/9/2025 1200 by Tammy Tubbs RN  Self-Care Promotion: meal set-up provided  Medication Review/Management:   medications reviewed   high-risk medications identified  Taken 2/9/2025 0800 by Tammy Tubbs RN  Self-Care Promotion: meal set-up provided  Medication Review/Management:   medications reviewed   high-risk medications identified  Intervention: Promote Injury-Free Environment  Recent Flowsheet Documentation  Taken 2/9/2025 1600 by Tammy Tubbs RN  Safety Promotion/Fall Prevention: activity supervised  Taken  2/9/2025 1200 by Tammy Tubbs RN  Safety Promotion/Fall Prevention: activity supervised  Taken 2/9/2025 0800 by Tammy Tubbs RN  Safety Promotion/Fall Prevention: activity supervised     Problem: Pain Acute  Goal: Optimal Pain Control and Function  Outcome: Progressing  Intervention: Optimize Psychosocial Wellbeing  Recent Flowsheet Documentation  Taken 2/9/2025 1600 by Tammy Tubbs RN  Supportive Measures:   active listening utilized   positive reinforcement provided   relaxation techniques promoted   verbalization of feelings encouraged  Diversional Activities: television  Taken 2/9/2025 1200 by Tammy Tubbs RN  Supportive Measures:   active listening utilized   positive reinforcement provided   relaxation techniques promoted   verbalization of feelings encouraged  Diversional Activities: television  Taken 2/9/2025 0800 by Tammy Tubbs RN  Supportive Measures:   active listening utilized   positive reinforcement provided   relaxation techniques promoted   verbalization of feelings encouraged  Diversional Activities: television  Intervention: Prevent or Manage Pain  Recent Flowsheet Documentation  Taken 2/9/2025 1600 by Tammy Tubbs RN  Sensory Stimulation Regulation:   care clustered   lighting decreased   quiet environment promoted   television on  Sleep/Rest Enhancement:   comfort measures   natural light exposure provided   regular sleep/rest pattern promoted   relaxation techniques promoted  Medication Review/Management:   medications reviewed   high-risk medications identified  Taken 2/9/2025 1200 by Tammy Tubbs, RN  Sensory Stimulation Regulation:   care clustered   lighting decreased   quiet environment promoted   television on  Sleep/Rest Enhancement:   comfort measures   natural light exposure provided   regular sleep/rest pattern promoted   relaxation techniques promoted  Medication Review/Management:   medications reviewed   high-risk medications  identified  Taken 2/9/2025 0800 by Tammy Tubbs RN  Sensory Stimulation Regulation:   care clustered   lighting decreased   quiet environment promoted   television on  Sleep/Rest Enhancement:   comfort measures   natural light exposure provided   regular sleep/rest pattern promoted   relaxation techniques promoted  Medication Review/Management:   medications reviewed   high-risk medications identified     Problem: Gas Exchange Impaired  Goal: Optimal Gas Exchange  Outcome: Progressing  Intervention: Optimize Oxygenation and Ventilation  Recent Flowsheet Documentation  Taken 2/9/2025 1800 by Tammy Tubbs RN  Head of Bed (HOB) Positioning: HOB at 60-90 degrees  Taken 2/9/2025 1600 by Tammy Tubbs RN  Airway/Ventilation Management:   airway patency maintained   calming measures promoted   oxygen therapy provided   position adjusted   positive pressure ventilation provided  Head of Bed (HOB) Positioning: HOB at 60-90 degrees  Taken 2/9/2025 1200 by Tammy Tubbs RN  Airway/Ventilation Management:   airway patency maintained   calming measures promoted   oxygen therapy provided   position adjusted   positive pressure ventilation provided  Head of Bed (HOB) Positioning: HOB at 45 degrees  Taken 2/9/2025 0800 by Tammy Tubbs RN  Airway/Ventilation Management:   airway patency maintained   calming measures promoted   oxygen therapy provided   position adjusted   positive pressure ventilation provided  Head of Bed (HOB) Positioning: HOB at 45 degrees     Problem: Risk for Delirium  Goal: Optimal Coping  Outcome: Progressing  Intervention: Optimize Psychosocial Adjustment to Delirium  Recent Flowsheet Documentation  Taken 2/9/2025 1600 by Tammy Tubbs RN  Supportive Measures:   active listening utilized   positive reinforcement provided   relaxation techniques promoted   verbalization of feelings encouraged  Taken 2/9/2025 1200 by Tammy Tubbs RN  Supportive Measures:   active  listening utilized   positive reinforcement provided   relaxation techniques promoted   verbalization of feelings encouraged  Taken 2/9/2025 0800 by Tammy Tubbs RN  Supportive Measures:   active listening utilized   positive reinforcement provided   relaxation techniques promoted   verbalization of feelings encouraged  Goal: Improved Behavioral Control  Outcome: Progressing  Intervention: Prevent and Manage Agitation  Recent Flowsheet Documentation  Taken 2/9/2025 1600 by Tammy Tubbs RN  Environment Familiarity/Consistency:   daily routine followed   familiar objects from home provided  Taken 2/9/2025 1200 by Tammy Tubbs RN  Environment Familiarity/Consistency:   daily routine followed   familiar objects from home provided  Taken 2/9/2025 0800 by Tammy Tubbs RN  Environment Familiarity/Consistency:   daily routine followed   familiar objects from home provided  Intervention: Minimize Safety Risk  Recent Flowsheet Documentation  Taken 2/9/2025 1600 by Tammy Tubbs RN  Enhanced Safety Measures:    at bedside   room near unit station   pain management   review medications for side effects with activity  Trust Relationship/Rapport:   care explained   choices provided   emotional support provided   empathic listening provided   questions answered   questions encouraged   reassurance provided   thoughts/feelings acknowledged  Taken 2/9/2025 1200 by Tammy Tubbs RN  Enhanced Safety Measures:    at bedside   room near unit station   pain management   review medications for side effects with activity  Trust Relationship/Rapport:   care explained   choices provided   emotional support provided   empathic listening provided   questions answered   questions encouraged   reassurance provided   thoughts/feelings acknowledged  Taken 2/9/2025 0800 by Tammy Tubbs RN  Enhanced Safety Measures:    at bedside   room near unit station    pain management   review medications for side effects with activity  Trust Relationship/Rapport:   care explained   choices provided   emotional support provided   empathic listening provided   questions answered   questions encouraged   reassurance provided   thoughts/feelings acknowledged  Goal: Improved Attention and Thought Clarity  Outcome: Progressing  Intervention: Maximize Cognitive Function  Recent Flowsheet Documentation  Taken 2/9/2025 1600 by Tammy Tubbs, RN  Sensory Stimulation Regulation:   care clustered   lighting decreased   quiet environment promoted   television on  Reorientation Measures:   calendar in view   clock in view   familiar social contact encouraged   reorientation provided  Taken 2/9/2025 1200 by Tammy Tubbs, RN  Sensory Stimulation Regulation:   care clustered   lighting decreased   quiet environment promoted   television on  Reorientation Measures:   calendar in view   clock in view   familiar social contact encouraged   reorientation provided  Taken 2/9/2025 0800 by Tammy Tubbs RN  Sensory Stimulation Regulation:   care clustered   lighting decreased   quiet environment promoted   television on  Reorientation Measures:   calendar in view   clock in view   familiar social contact encouraged   reorientation provided  Goal: Improved Sleep  Outcome: Progressing  Intervention: Promote Sleep  Recent Flowsheet Documentation  Taken 2/9/2025 1600 by Tammy Tubbs, RN  Sleep/Rest Enhancement:   comfort measures   natural light exposure provided   regular sleep/rest pattern promoted   relaxation techniques promoted  Taken 2/9/2025 1200 by Tammy Tubbs RN  Sleep/Rest Enhancement:   comfort measures   natural light exposure provided   regular sleep/rest pattern promoted   relaxation techniques promoted  Taken 2/9/2025 0800 by Tammy Tubbs RN  Sleep/Rest Enhancement:   comfort measures   natural light exposure provided   regular sleep/rest pattern  promoted   relaxation techniques promoted     Problem: Comorbidity Management  Goal: Maintenance of COPD Symptom Control  Outcome: Progressing  Intervention: Maintain COPD Symptom Control  Recent Flowsheet Documentation  Taken 2/9/2025 1600 by Tammy Tubbs RN  Medication Review/Management:   medications reviewed   high-risk medications identified  Taken 2/9/2025 1200 by Tammy Tubbs RN  Medication Review/Management:   medications reviewed   high-risk medications identified  Taken 2/9/2025 0800 by Tammy Tubbs RN  Medication Review/Management:   medications reviewed   high-risk medications identified  Goal: Blood Glucose Levels Within Targeted Range  Outcome: Progressing  Intervention: Monitor and Manage Glycemia  Recent Flowsheet Documentation  Taken 2/9/2025 1600 by Tammy Tubbs RN  Medication Review/Management:   medications reviewed   high-risk medications identified  Taken 2/9/2025 1200 by Tammy Tubbs RN  Medication Review/Management:   medications reviewed   high-risk medications identified  Taken 2/9/2025 0800 by Tammy Tubbs RN  Medication Review/Management:   medications reviewed   high-risk medications identified  Goal: Blood Pressure in Desired Range  Outcome: Progressing  Intervention: Maintain Blood Pressure Management  Recent Flowsheet Documentation  Taken 2/9/2025 1600 by Tammy Tubbs RN  Medication Review/Management:   medications reviewed   high-risk medications identified  Taken 2/9/2025 1200 by Tammy Tubbs RN  Medication Review/Management:   medications reviewed   high-risk medications identified  Taken 2/9/2025 0800 by Tammy Tubbs RN  Medication Review/Management:   medications reviewed   high-risk medications identified     Problem: Restraint, Nonviolent  Goal: Absence of Harm or Injury  Intervention: Implement Least Restrictive Safety Strategies  Recent Flowsheet Documentation  Taken 2/9/2025 1600 by Tammy Tubbs  RN  Medical Device Protection: tubing secured  Diversional Activities: television  Taken 2/9/2025 1200 by Tammy Tubbs RN  Medical Device Protection: tubing secured  Diversional Activities: television  Taken 2/9/2025 0800 by Tammy Tubbs RN  Medical Device Protection: tubing secured  Diversional Activities: television  Intervention: Protect Dignity, Rights and Personal Wellbeing  Recent Flowsheet Documentation  Taken 2/9/2025 1600 by Tammy Tubbs RN  Trust Relationship/Rapport:   care explained   choices provided   emotional support provided   empathic listening provided   questions answered   questions encouraged   reassurance provided   thoughts/feelings acknowledged  Taken 2/9/2025 1200 by Tammy Tubbs RN  Trust Relationship/Rapport:   care explained   choices provided   emotional support provided   empathic listening provided   questions answered   questions encouraged   reassurance provided   thoughts/feelings acknowledged  Taken 2/9/2025 0800 by Tammy Tubbs RN  Trust Relationship/Rapport:   care explained   choices provided   emotional support provided   empathic listening provided   questions answered   questions encouraged   reassurance provided   thoughts/feelings acknowledged  Intervention: Protect Skin and Joint Integrity  Recent Flowsheet Documentation  Taken 2/9/2025 1800 by Tammy Tubbs RN  Body Position:   turned   side-lying   heels elevated  Taken 2/9/2025 1600 by Tammy Tubbs RN  Body Position:   turned   side-lying   heels elevated  Skin Protection:   adhesive use limited   incontinence pads utilized   pulse oximeter probe site changed   silicone foam dressing in place  Range of Motion: active ROM (range of motion) encouraged  Taken 2/9/2025 1200 by Tammy Tubbs RN  Body Position:   turned   side-lying   heels elevated  Skin Protection:   adhesive use limited   incontinence pads utilized   pulse oximeter probe site changed   silicone foam  dressing in place  Range of Motion: active ROM (range of motion) encouraged  Taken 2/9/2025 0800 by Tammy Tubbs RN  Body Position:   turned   side-lying   heels elevated  Skin Protection:   adhesive use limited   incontinence pads utilized   pulse oximeter probe site changed   silicone foam dressing in place  Range of Motion: active ROM (range of motion) encouraged     Problem: Mechanical Ventilation Invasive  Goal: Effective Communication  Intervention: Ensure Effective Communication  Recent Flowsheet Documentation  Taken 2/9/2025 1600 by Tammy Tubbs RN  Diversional Activities: television  Trust Relationship/Rapport:   care explained   choices provided   emotional support provided   empathic listening provided   questions answered   questions encouraged   reassurance provided   thoughts/feelings acknowledged  Taken 2/9/2025 1200 by Tammy Tubbs RN  Diversional Activities: television  Trust Relationship/Rapport:   care explained   choices provided   emotional support provided   empathic listening provided   questions answered   questions encouraged   reassurance provided   thoughts/feelings acknowledged  Taken 2/9/2025 0800 by Tammy Tubbs RN  Diversional Activities: television  Trust Relationship/Rapport:   care explained   choices provided   emotional support provided   empathic listening provided   questions answered   questions encouraged   reassurance provided   thoughts/feelings acknowledged  Goal: Optimal Device Function  Intervention: Optimize Device Care and Function  Recent Flowsheet Documentation  Taken 2/9/2025 1600 by Tammy Tubbs, RN  Airway Safety Measures:   all equipment/monitors on and audible   manual resuscitator/mask/valve in room   oxygen flowmeter   suction at bedside   suction equipment   suction regulator  Airway/Ventilation Management:   airway patency maintained   calming measures promoted   oxygen therapy provided   position adjusted   positive pressure  ventilation provided  Oral Care:   swabbed with sterile water   lip/mouth moisturizer applied  Taken 2/9/2025 1200 by Tammy Tubbs RN  Airway Safety Measures:   all equipment/monitors on and audible   manual resuscitator/mask/valve in room   oxygen flowmeter   suction at bedside   suction equipment   suction regulator  Airway/Ventilation Management:   airway patency maintained   calming measures promoted   oxygen therapy provided   position adjusted   positive pressure ventilation provided  Oral Care:   swabbed with sterile water   lip/mouth moisturizer applied  Taken 2/9/2025 0800 by Tammy Tubbs RN  Airway Safety Measures:   all equipment/monitors on and audible   manual resuscitator/mask/valve in room   oxygen flowmeter   suction at bedside   suction equipment   suction regulator  Airway/Ventilation Management:   airway patency maintained   calming measures promoted   oxygen therapy provided   position adjusted   positive pressure ventilation provided  Oral Care:   swabbed with sterile water   lip/mouth moisturizer applied  Goal: Mechanical Ventilation Liberation  Intervention: Promote Extubation and Mechanical Ventilation Liberation  Recent Flowsheet Documentation  Taken 2/9/2025 1600 by Tammy Tubbs RN  Sleep/Rest Enhancement:   comfort measures   natural light exposure provided   regular sleep/rest pattern promoted   relaxation techniques promoted  Medication Review/Management:   medications reviewed   high-risk medications identified  Environmental Support:   calm environment promoted   comfort object encouraged   environmental consistency promoted   personal routine supported   rest periods encouraged  Taken 2/9/2025 1200 by Tammy Tubbs RN  Sleep/Rest Enhancement:   comfort measures   natural light exposure provided   regular sleep/rest pattern promoted   relaxation techniques promoted  Medication Review/Management:   medications reviewed   high-risk medications  identified  Environmental Support:   calm environment promoted   comfort object encouraged   environmental consistency promoted   personal routine supported   rest periods encouraged  Taken 2/9/2025 0800 by Tammy Tubbs RN  Sleep/Rest Enhancement:   comfort measures   natural light exposure provided   regular sleep/rest pattern promoted   relaxation techniques promoted  Medication Review/Management:   medications reviewed   high-risk medications identified  Environmental Support:   calm environment promoted   comfort object encouraged   environmental consistency promoted   personal routine supported   rest periods encouraged  Goal: Optimal Nutrition Delivery  Intervention: Optimize Nutrition Delivery  Recent Flowsheet Documentation  Taken 2/9/2025 1600 by Tammy Tubbs RN  Nutrition Support Management: weight trending reviewed  Taken 2/9/2025 1200 by Tammy Tubbs RN  Nutrition Support Management: weight trending reviewed  Taken 2/9/2025 0800 by Tammy Tubbs RN  Nutrition Support Management: weight trending reviewed  Goal: Absence of Device-Related Skin and Tissue Injury  Intervention: Maintain Skin and Tissue Health  Recent Flowsheet Documentation  Taken 2/9/2025 1600 by Tammy Tubbs RN  Device Skin Pressure Protection:   absorbent pad utilized/changed   skin-to-device areas padded   skin-to-skin areas padded   tubing/devices free from skin contact   positioning supports utilized  Taken 2/9/2025 1200 by Tammy Tubbs RN  Device Skin Pressure Protection:   absorbent pad utilized/changed   skin-to-device areas padded   skin-to-skin areas padded   tubing/devices free from skin contact   positioning supports utilized  Taken 2/9/2025 0800 by Tammy Tubbs RN  Device Skin Pressure Protection:   absorbent pad utilized/changed   skin-to-device areas padded   skin-to-skin areas padded   tubing/devices free from skin contact   positioning supports utilized  Goal: Absence of  Ventilator-Induced Lung Injury  Intervention: Facilitate Lung-Protection Measures  Recent Flowsheet Documentation  Taken 2/9/2025 1600 by Tammy Tubbs RN  Lung Protection Measures: lung compliance monitored  Taken 2/9/2025 1200 by Tamym Tubbs RN  Lung Protection Measures: lung compliance monitored  Taken 2/9/2025 0800 by Tammy Tubbs RN  Lung Protection Measures: lung compliance monitored  Intervention: Prevent Ventilator-Associated Pneumonia  Recent Flowsheet Documentation  Taken 2/9/2025 1800 by Tammy Tubbs RN  Head of Bed (HOB) Positioning: HOB at 60-90 degrees  Taken 2/9/2025 1600 by Tammy Tubbs RN  Oral Care:   swabbed with sterile water   lip/mouth moisturizer applied  Head of Bed (HOB) Positioning: HOB at 60-90 degrees  Taken 2/9/2025 1200 by Tammy Tubbs RN  Oral Care:   swabbed with sterile water   lip/mouth moisturizer applied  Head of Bed (HOB) Positioning: HOB at 45 degrees  Taken 2/9/2025 0800 by Tammy Tubbs RN  Oral Care:   swabbed with sterile water   lip/mouth moisturizer applied  Head of Bed (HOB) Positioning: HOB at 45 degrees

## 2025-02-11 ENCOUNTER — APPOINTMENT (OUTPATIENT)
Dept: PHYSICAL THERAPY | Facility: CLINIC | Age: 75
DRG: 207 | End: 2025-02-11
Attending: HOSPITALIST
Payer: COMMERCIAL

## 2025-02-11 ENCOUNTER — APPOINTMENT (OUTPATIENT)
Dept: SPEECH THERAPY | Facility: CLINIC | Age: 75
DRG: 207 | End: 2025-02-11
Attending: HOSPITALIST
Payer: COMMERCIAL

## 2025-02-11 PROBLEM — G92.8 TOXIC METABOLIC ENCEPHALOPATHY: Status: ACTIVE | Noted: 2025-01-09

## 2025-02-11 LAB
ANION GAP SERPL CALCULATED.3IONS-SCNC: 11 MMOL/L (ref 7–15)
ATRIAL RATE - MUSE: 125 BPM
BACTERIA SPT CULT: NORMAL
BASE EXCESS BLDV CALC-SCNC: 9 MMOL/L (ref -3–3)
BUN SERPL-MCNC: 61.7 MG/DL (ref 8–23)
CALCIUM SERPL-MCNC: 9.9 MG/DL (ref 8.8–10.4)
CHLORIDE SERPL-SCNC: 94 MMOL/L (ref 98–107)
CREAT SERPL-MCNC: 3.04 MG/DL (ref 0.51–0.95)
DIASTOLIC BLOOD PRESSURE - MUSE: NORMAL MMHG
EGFRCR SERPLBLD CKD-EPI 2021: 15 ML/MIN/1.73M2
ERYTHROCYTE [DISTWIDTH] IN BLOOD BY AUTOMATED COUNT: 17.1 % (ref 10–15)
GLUCOSE BLDC GLUCOMTR-MCNC: 124 MG/DL (ref 70–99)
GLUCOSE BLDC GLUCOMTR-MCNC: 142 MG/DL (ref 70–99)
GLUCOSE BLDC GLUCOMTR-MCNC: 190 MG/DL (ref 70–99)
GLUCOSE BLDC GLUCOMTR-MCNC: 227 MG/DL (ref 70–99)
GLUCOSE BLDC GLUCOMTR-MCNC: 232 MG/DL (ref 70–99)
GLUCOSE SERPL-MCNC: 226 MG/DL (ref 70–99)
GRAM STAIN RESULT: NORMAL
HCO3 BLDV-SCNC: 34 MMOL/L (ref 21–28)
HCO3 SERPL-SCNC: 32 MMOL/L (ref 22–29)
HCT VFR BLD AUTO: 27 % (ref 35–47)
HGB BLD-MCNC: 8.4 G/DL (ref 11.7–15.7)
INTERPRETATION ECG - MUSE: NORMAL
IRON BINDING CAPACITY (ROCHE): 187 UG/DL (ref 240–430)
IRON SATN MFR SERPL: 12 % (ref 15–46)
IRON SERPL-MCNC: 22 UG/DL (ref 37–145)
MAGNESIUM SERPL-MCNC: 2.4 MG/DL (ref 1.7–2.3)
MCH RBC QN AUTO: 31.2 PG (ref 26.5–33)
MCHC RBC AUTO-ENTMCNC: 31.1 G/DL (ref 31.5–36.5)
MCV RBC AUTO: 100 FL (ref 78–100)
O2/TOTAL GAS SETTING VFR VENT: 2 %
OXYHGB MFR BLDV: 85 % (ref 70–75)
P AXIS - MUSE: NORMAL DEGREES
PCO2 BLDV: 53 MM HG (ref 40–50)
PH BLDV: 7.42 [PH] (ref 7.32–7.43)
PHOSPHATE SERPL-MCNC: 3.6 MG/DL (ref 2.5–4.5)
PLATELET # BLD AUTO: 162 10E3/UL (ref 150–450)
PO2 BLDV: 52 MM HG (ref 25–47)
POTASSIUM SERPL-SCNC: 4.4 MMOL/L (ref 3.4–5.3)
PR INTERVAL - MUSE: NORMAL MS
QRS DURATION - MUSE: 90 MS
QT - MUSE: 302 MS
QTC - MUSE: 435 MS
R AXIS - MUSE: 93 DEGREES
RBC # BLD AUTO: 2.69 10E6/UL (ref 3.8–5.2)
SAO2 % BLDV: 87.9 % (ref 70–75)
SODIUM SERPL-SCNC: 137 MMOL/L (ref 135–145)
SYSTOLIC BLOOD PRESSURE - MUSE: NORMAL MMHG
T AXIS - MUSE: 244 DEGREES
VENTRICULAR RATE- MUSE: 125 BPM
WBC # BLD AUTO: 5.1 10E3/UL (ref 4–11)

## 2025-02-11 PROCEDURE — 120N000013 HC R&B IMCU

## 2025-02-11 PROCEDURE — 250N000011 HC RX IP 250 OP 636: Performed by: PSYCHIATRY & NEUROLOGY

## 2025-02-11 PROCEDURE — 85018 HEMOGLOBIN: CPT | Performed by: INTERNAL MEDICINE

## 2025-02-11 PROCEDURE — 250N000013 HC RX MED GY IP 250 OP 250 PS 637: Performed by: INTERNAL MEDICINE

## 2025-02-11 PROCEDURE — 83735 ASSAY OF MAGNESIUM: CPT | Performed by: HOSPITALIST

## 2025-02-11 PROCEDURE — 250N000009 HC RX 250: Performed by: HOSPITALIST

## 2025-02-11 PROCEDURE — 82310 ASSAY OF CALCIUM: CPT | Performed by: HOSPITALIST

## 2025-02-11 PROCEDURE — 36415 COLL VENOUS BLD VENIPUNCTURE: CPT | Performed by: INTERNAL MEDICINE

## 2025-02-11 PROCEDURE — 250N000013 HC RX MED GY IP 250 OP 250 PS 637: Performed by: HOSPITALIST

## 2025-02-11 PROCEDURE — 83550 IRON BINDING TEST: CPT | Performed by: INTERNAL MEDICINE

## 2025-02-11 PROCEDURE — 99223 1ST HOSP IP/OBS HIGH 75: CPT | Mod: FS | Performed by: PHYSICIAN ASSISTANT

## 2025-02-11 PROCEDURE — 94660 CPAP INITIATION&MGMT: CPT

## 2025-02-11 PROCEDURE — 94640 AIRWAY INHALATION TREATMENT: CPT | Mod: 76

## 2025-02-11 PROCEDURE — 250N000011 HC RX IP 250 OP 636: Performed by: HOSPITALIST

## 2025-02-11 PROCEDURE — 84100 ASSAY OF PHOSPHORUS: CPT | Performed by: INTERNAL MEDICINE

## 2025-02-11 PROCEDURE — 250N000013 HC RX MED GY IP 250 OP 250 PS 637: Performed by: PHYSICIAN ASSISTANT

## 2025-02-11 PROCEDURE — 94640 AIRWAY INHALATION TREATMENT: CPT

## 2025-02-11 PROCEDURE — 999N000157 HC STATISTIC RCP TIME EA 10 MIN

## 2025-02-11 PROCEDURE — 92526 ORAL FUNCTION THERAPY: CPT | Mod: GN | Performed by: SPEECH-LANGUAGE PATHOLOGIST

## 2025-02-11 PROCEDURE — 97530 THERAPEUTIC ACTIVITIES: CPT | Mod: GP | Performed by: PHYSICAL THERAPIST

## 2025-02-11 PROCEDURE — 250N000011 HC RX IP 250 OP 636: Performed by: INTERNAL MEDICINE

## 2025-02-11 PROCEDURE — 36415 COLL VENOUS BLD VENIPUNCTURE: CPT | Performed by: HOSPITALIST

## 2025-02-11 PROCEDURE — 82565 ASSAY OF CREATININE: CPT | Performed by: HOSPITALIST

## 2025-02-11 PROCEDURE — 83540 ASSAY OF IRON: CPT | Performed by: INTERNAL MEDICINE

## 2025-02-11 PROCEDURE — 99233 SBSQ HOSP IP/OBS HIGH 50: CPT | Performed by: HOSPITALIST

## 2025-02-11 PROCEDURE — 82805 BLOOD GASES W/O2 SATURATION: CPT | Performed by: HOSPITALIST

## 2025-02-11 PROCEDURE — 99232 SBSQ HOSP IP/OBS MODERATE 35: CPT | Performed by: INTERNAL MEDICINE

## 2025-02-11 RX ORDER — HYDRALAZINE HYDROCHLORIDE 20 MG/ML
5 INJECTION INTRAMUSCULAR; INTRAVENOUS EVERY 6 HOURS PRN
Status: DISCONTINUED | OUTPATIENT
Start: 2025-02-11 | End: 2025-02-16

## 2025-02-11 RX ORDER — THIAMINE HYDROCHLORIDE 100 MG/ML
200 INJECTION, SOLUTION INTRAMUSCULAR; INTRAVENOUS DAILY
Status: COMPLETED | OUTPATIENT
Start: 2025-02-11 | End: 2025-02-15

## 2025-02-11 RX ORDER — AMIODARONE HYDROCHLORIDE 200 MG/1
200 TABLET ORAL 2 TIMES DAILY
Status: DISCONTINUED | OUTPATIENT
Start: 2025-02-11 | End: 2025-02-26 | Stop reason: HOSPADM

## 2025-02-11 RX ADMIN — Medication 40 MG: at 10:31

## 2025-02-11 RX ADMIN — SENNOSIDES 10 ML: 8.8 LIQUID ORAL at 21:22

## 2025-02-11 RX ADMIN — LEVALBUTEROL HYDROCHLORIDE 1.25 MG: 1.25 SOLUTION RESPIRATORY (INHALATION) at 10:34

## 2025-02-11 RX ADMIN — INSULIN ASPART 1 UNITS: 100 INJECTION, SOLUTION INTRAVENOUS; SUBCUTANEOUS at 21:18

## 2025-02-11 RX ADMIN — Medication 50 MCG: at 09:54

## 2025-02-11 RX ADMIN — IPRATROPIUM BROMIDE 0.5 MG: 0.5 SOLUTION RESPIRATORY (INHALATION) at 07:16

## 2025-02-11 RX ADMIN — INSULIN GLARGINE 15 UNITS: 100 INJECTION, SOLUTION SUBCUTANEOUS at 21:19

## 2025-02-11 RX ADMIN — SERTRALINE HYDROCHLORIDE 75 MG: 50 TABLET ORAL at 21:22

## 2025-02-11 RX ADMIN — INSULIN GLARGINE 15 UNITS: 100 INJECTION, SOLUTION SUBCUTANEOUS at 10:07

## 2025-02-11 RX ADMIN — AMIODARONE HYDROCHLORIDE 200 MG: 200 TABLET ORAL at 12:57

## 2025-02-11 RX ADMIN — IPRATROPIUM BROMIDE 0.5 MG: 0.5 SOLUTION RESPIRATORY (INHALATION) at 18:22

## 2025-02-11 RX ADMIN — Medication 1 ML: at 17:36

## 2025-02-11 RX ADMIN — MICONAZOLE NITRATE: 2 POWDER TOPICAL at 12:02

## 2025-02-11 RX ADMIN — CETIRIZINE HYDROCHLORIDE 5 MG: 5 TABLET ORAL at 21:22

## 2025-02-11 RX ADMIN — AMIODARONE HYDROCHLORIDE 200 MG: 200 TABLET ORAL at 21:23

## 2025-02-11 RX ADMIN — LEVALBUTEROL HYDROCHLORIDE 1.25 MG: 1.25 SOLUTION RESPIRATORY (INHALATION) at 14:12

## 2025-02-11 RX ADMIN — TACROLIMUS: 1 OINTMENT TOPICAL at 12:02

## 2025-02-11 RX ADMIN — INSULIN ASPART 1 UNITS: 100 INJECTION, SOLUTION INTRAVENOUS; SUBCUTANEOUS at 13:22

## 2025-02-11 RX ADMIN — APIXABAN 5 MG: 5 TABLET, FILM COATED ORAL at 21:22

## 2025-02-11 RX ADMIN — MICONAZOLE NITRATE: 2 POWDER TOPICAL at 21:23

## 2025-02-11 RX ADMIN — Medication 5 ML: at 09:54

## 2025-02-11 RX ADMIN — TACROLIMUS: 1 OINTMENT TOPICAL at 21:23

## 2025-02-11 RX ADMIN — THIAMINE HYDROCHLORIDE 200 MG: 100 INJECTION, SOLUTION INTRAMUSCULAR; INTRAVENOUS at 13:29

## 2025-02-11 RX ADMIN — Medication 1 MG: at 22:12

## 2025-02-11 RX ADMIN — ATORVASTATIN CALCIUM 20 MG: 20 TABLET, FILM COATED ORAL at 21:22

## 2025-02-11 RX ADMIN — FUROSEMIDE 80 MG: 40 TABLET ORAL at 09:53

## 2025-02-11 RX ADMIN — QUETIAPINE FUMARATE 50 MG: 50 TABLET ORAL at 22:07

## 2025-02-11 RX ADMIN — IPRATROPIUM BROMIDE 0.5 MG: 0.5 SOLUTION RESPIRATORY (INHALATION) at 14:12

## 2025-02-11 RX ADMIN — HYDRALAZINE HYDROCHLORIDE 5 MG: 20 INJECTION INTRAMUSCULAR; INTRAVENOUS at 16:43

## 2025-02-11 RX ADMIN — LEVALBUTEROL HYDROCHLORIDE 1.25 MG: 1.25 SOLUTION RESPIRATORY (INHALATION) at 07:16

## 2025-02-11 RX ADMIN — AMIODARONE HYDROCHLORIDE 0.5 MG/MIN: 1.8 INJECTION, SOLUTION INTRAVENOUS at 00:26

## 2025-02-11 RX ADMIN — INSULIN ASPART 1 UNITS: 100 INJECTION, SOLUTION INTRAVENOUS; SUBCUTANEOUS at 04:18

## 2025-02-11 RX ADMIN — BUDESONIDE 1 MG: 1 INHALANT ORAL at 18:22

## 2025-02-11 RX ADMIN — Medication 60 ML: at 10:31

## 2025-02-11 RX ADMIN — IPRATROPIUM BROMIDE 0.5 MG: 0.5 SOLUTION RESPIRATORY (INHALATION) at 10:34

## 2025-02-11 RX ADMIN — BUDESONIDE 1 MG: 1 INHALANT ORAL at 07:19

## 2025-02-11 RX ADMIN — ACETAMINOPHEN 650 MG: 325 TABLET, FILM COATED ORAL at 17:32

## 2025-02-11 RX ADMIN — Medication 60 ML: at 21:22

## 2025-02-11 RX ADMIN — APIXABAN 5 MG: 5 TABLET, FILM COATED ORAL at 09:54

## 2025-02-11 RX ADMIN — ACETAMINOPHEN 650 MG: 325 TABLET, FILM COATED ORAL at 22:12

## 2025-02-11 RX ADMIN — INSULIN ASPART 1 UNITS: 100 INJECTION, SOLUTION INTRAVENOUS; SUBCUTANEOUS at 10:06

## 2025-02-11 RX ADMIN — LEVALBUTEROL HYDROCHLORIDE 1.25 MG: 1.25 SOLUTION RESPIRATORY (INHALATION) at 18:22

## 2025-02-11 ASSESSMENT — ACTIVITIES OF DAILY LIVING (ADL)
ADLS_ACUITY_SCORE: 88
ADLS_ACUITY_SCORE: 81
ADLS_ACUITY_SCORE: 81
ADLS_ACUITY_SCORE: 88
ADLS_ACUITY_SCORE: 84
ADLS_ACUITY_SCORE: 88
ADLS_ACUITY_SCORE: 81
ADLS_ACUITY_SCORE: 81
ADLS_ACUITY_SCORE: 84
ADLS_ACUITY_SCORE: 84
ADLS_ACUITY_SCORE: 88
ADLS_ACUITY_SCORE: 83
ADLS_ACUITY_SCORE: 88
ADLS_ACUITY_SCORE: 88
ADLS_ACUITY_SCORE: 81
ADLS_ACUITY_SCORE: 81
ADLS_ACUITY_SCORE: 84
ADLS_ACUITY_SCORE: 88
ADLS_ACUITY_SCORE: 88
ADLS_ACUITY_SCORE: 81
ADLS_ACUITY_SCORE: 88

## 2025-02-11 NOTE — PROGRESS NOTES
[facility]    Medicine Progress Note - Hospitalist Service    Date of Admission:  1/8/2025    Assessment & Plan   Supriya Herr is a 74 year old female with PMHx significant for ESRD on HD, CHF, COPD, poor mobility (Lt AKA, Obesity, WC bound), DM type II, hypertension.  She was brought to the ER by EMS for evaluation of shortness of breath, diagnosed with influenza A and admitted on 1/8/25      Hospital course summary  Patient was admitted, subsequently intubated for worsening respiratory failure and remained on mechanical ventilator 1/9 - 1/18.  She was reintubated and remained on mechanical ventilator 1/20 - 1/25.  Was treated with Tamiflu, IV antibiotic, steroid, has completed courses.  Hospital course complicated by A-fib RVR.  Was on amiodarone drip with eventual transition to p.o. Also required diltiazem  for rate control.  Required BiPAP--HFNC--intermittent BiPAP use.  Ongoing tube feeding and modified diet per SLP.  Delirium managed with Seroquel.  Concern for recurrent pneumonia, hospital-acquired on 2/1, restarted on antibiotics.  ID consulted, recommended discontinuing antibiotics, respiratory failure likely related to fluid overload.    Patient developed symptomatic bradycardia on 2/5/25 triggering a RRT. She was placed on dopamine drip and transferred back to the ICU. Bradycardia was likely due to rate controlling medications. Cardiology consulted with adjustment of medications. Patient has remained in NSR without recurrence of bradycardia on amio drip. While in the ICU, patient required dialysis on pressors, now transitioned to midodrine . Hypoxic and hypercarbic resp failure is much improved with dialysis.     Patient transferred back to hospitalist service on 2/8/25.      Acute hypoxic respiratory failure-improving  Influenza A pneumonia  Acute COPD exacerbation  Hospital-acquired pneumonia  Pharyngeal edema  -Initial presentation with shortness of breath, diagnosed  influenza A. Initial course as noted above.  -Had bronchoscopy and BAL 1/20, culture grew actinomyces, CT chest showed RML infiltrate versus atelectasis but no sign of actinomycosis per intensivist and felt this was likely colonization.    -Pneumonia treated with cefepime and vancomycin, cefepime changed to Levaquin and completed antibiotic course 1/26.  -Was treated with IV steroid and Tamiflu and completed the course  -2/1 given increased oxygen need during earlier course of hospital stay there was concern about infection again and blood cultures done on 2/1 have been negative and Pro-Geraldo was elevated and CT chest was concerning for right lower lobe pneumonia and ID was consulted and patient was again started on vancomycin and Levaquin  -ID was consulted and they recommended to stop antibiotics and respiratory failure was likely due to fluid overload and patient was improving with dialysis  -On exam she is on 1 L nasal cannula oxygen  -Continue nebs-budesonide 1 Mg twice daily, Neb 4 times daily,       Recurrent atrial fibrillation with RVR now in normal sinus rhythm  Acute on chronic CHFpEF  Hypertension  Shock, due to sedation-resolved  Symptomatic bradycardia-Resolved  -PTA is on amlodipine 5 Mg daily, Coreg 25 Mg p.o. twice daily and Lasix.  -TTE this is stay shows LVEF 60%, no pericardial effusion, no significant valvular disease, diastolic function indeterminate.  No WMA.  -Cardiology was following the patient and patient earlier was on amiodarone drip which was started on 1/27 for 14-day course and patient was also started on Coreg and dose was uptitrated to PTA dose of 25 twice daily on 2/2 along with Lipitor 20 mg and Eliquis 5 mg twice daily  -Patient developed symptomatic bradycardia on 2/5/25 triggering a RRT. She was placed on dopamine drip and transferred back to the ICU.  - Bradycardia was likely due to rate controlling medications, Coreg and dilt which were held and cardiology was re consulted and  cardiology recommended to continue amiodarone and dose was decreased to 200 mg daily and Coreg and diltiazem were discontinued and patient remained in normal sinus rhythm without bradycardia and cardiology signed off on 2/7  -2/10-developed atrial fibrillation with RVR and started on amiodarone drip and EP consulted  -2/11-converted to normal sinus rhythm and seen by EP and they recommended to finish the 24-hour IV load and then switch to amiodarone oral 200 twice daily  -If she develops another episode of A-fib with RVR then will benefit from pacemaker placement  -Continue with Eliquis 5 twice daily, atorvastatin 20 mg daily    Encephalopathy likely infectious and metabolic-improved  Anxiety  -Had CT head 1/20, negative for acute intracranial process.  Volume loss and chronic microvascular ischemic changes noted.  Suspected some baseline cognitive impairment.    -Of note has been having jerking movements and she has been in the hospital  -On p.m. of 2/9 patient had agitation and had to receive Zyprexa  -2/10 patient was only AO x 1  -2/10 CT head did not show any acute intracranial process  -2/10 EEG--> showed moderate generalized slowing and triphasic waves which commonly seen in chronic kidney disease.   -2/11-patient mentation is pretty good and she is little sleepy but is conversing and is AO x 2 to 3 most likely her encephalopathy is a combination of infectious and metabolic  -Appreciate input from the neurology team and they have ordered multivitamins    -Continue Seroquel  -Continue PTA Zoloft and gabapentin.  -Delirium precautions.  -PT, OT eval.  -Social work consult for discharge planning.      Diabetes mellitus, type 2  -HbA1c 6.5% on 1/17/25.  -PTA is on Lantus 18 units daily and NovoLog 5 units 3 times daily.  -Continue with Lantus 15 units twice daily and every 4 hours sliding scale insulin  -Blood sugars fairly well controlled.  Monitor as diet is advanced and tube feedings are discontinued, will  likely need to adjust insulin regimen.  -Hypoglycemia protocol    ESRD on HD , wasTue-Thu-Sat, now MWF  Anemia of chronic disease  Hyperkalemia-resolved  -Nephrology following-managing dialysis needs.  -Daily renal panel.  -Hemoglobin 7-8, at baseline. And is 8.4  -Gets EPO with hemodialysis.  -Continue sevelamer and vitamin D3.  -Nephrology resumed Lasix on nondialysis days on 2/3.    -Lokelma discontinued on 2/10 as per discussion with nephrology  -Continue with dialysis as per the nephrology team    Constipation  -Noted on CT 2/2  -Continue the bowel regimen      Dysphagia  Suspect due to recurrent intubation.  -Dietitian consulted.  -Continue tube feedings for now.  -Did well with video swallow study on 1/31/25, diet advanced.  -continue cycling tube feeds.     Family communication  -I did  discuss plan of care in detail with patient's daughter Caroline [phone] at 408 pm and she had questions which were answered          Diet: Room Service  Adult Formula Drip Feeding: Continuous adicate timeads Standard 1.4; Nasoduodenal tube; Goal Rate: 55; mL/hr; From: 4:00 PM; To: 6:00 AM; OK to restart nocturnal cycle at goal rate of 55 ml/hr  Pureed Diet (level 4) Thin Liquids (level 0) (upright, upper dentures in, straws OK)    DVT Prophylaxis: DOAC  Bradshaw Catheter: Not present  Lines: None     Cardiac Monitoring: None  Code Status: Full Code      Clinically Significant Risk Factors          # Hypochloremia: Lowest Cl = 94 mmol/L in last 2 days, will monitor as appropriate      # Hypoalbuminemia: Lowest albumin = 2.8 g/dL at 1/13/2025  5:14 AM, will monitor as appropriate     # Hypertension: Noted on problem list    # Chronic heart failure with preserved ejection fraction: heart failure noted on problem list and last echo with EF >50%    # Acute Hypercapnic Respiratory Failure: based on venous blood gas results.  Continue supplemental oxygen and ventilatory support as indicated.        # DMII: A1C = 6.5 % (Ref range: <5.7 %)  "within past 6 months   # Obesity: Estimated body mass index is 33.59 kg/m  as calculated from the following:    Height as of this encounter: 1.702 m (5' 7.01\").    Weight as of this encounter: 97.3 kg (214 lb 8.1 oz).        # Financial/Environmental Concerns: none         Social Drivers of Health    Tobacco Use: Medium Risk (9/11/2024)    Patient History     Smoking Tobacco Use: Former     Smokeless Tobacco Use: Never   Physical Activity: Insufficiently Active (7/19/2024)    Exercise Vital Sign     Days of Exercise per Week: 1 day     Minutes of Exercise per Session: 10 min   Social Connections: Unknown (7/19/2024)    Social Connection and Isolation Panel [NHANES]     Frequency of Social Gatherings with Friends and Family: More than three times a week          Disposition Plan     Medically Ready for Discharge: Anticipated in 2-4 Days, improvement in mental status, improvement in heart rate             Johanne Crowell MD  Hospitalist Service  [facility]  Securely message with [employer] (more info)  Text page via [employer] Paging/Directory     This note was completed in part using dictation via the Dragon voice recognition software. Some word and grammatical errors may occur and must be interpreted in the appropriate clinical context. If there are any questions pertaining to this issue, please contact me for further clarification.      Patient is critically ill and prognosis is guarded    ______________________________________________________________________    Interval History     -Reviewed events and patient was sitting in the chair and is little sleepy but was participating in conversation and is much more alert as compared to yesterday and knows that she is in the hospital, name of the kids, what kind of work she did in the past    Physical Exam   Vital Signs: Temp: 98.3  F (36.8  C) Temp src: Oral BP: (!) 161/66 Pulse: 75   Resp: 24 SpO2: 97 % O2 Device: Nasal cannula Oxygen Delivery: 1 " LPM  Weight: 214 lbs 8.12 oz        General: AO x 2-3  Respiratory:I did not appreciate any significant wheeze  Cardiovascular: Regular rate , S1 and S2 normal with no murmer or rubs or gallops  Abdomen:   soft , non tender , non distended and bowel sound present   Neurologic:No facial droop, follows some of the commands  Musculoskeletal: Normal Range of motion over upper and lower extremities bilaterally   Psychiatric: cooperative      Medical Decision Making       Spent in care of patient is 52 minutes and I reviewed labs including CBC and basic metabolic panel, reviewed notes from the cardiology, neurology, reviewed imaging and EEG and discussed plan of care in detail with the patient, nursing staff, family, nephrology,      Data     I have personally reviewed the following data over the past 24 hrs:    5.1  \   8.4 (L)   / 162     137 94 (L) 61.7 (H) /  227 (H)   4.4 32 (H) 3.04 (H) \     Procal: N/A CRP: N/A Lactic Acid: 0.7         Imaging results reviewed over the past 24 hrs:   Recent Results (from the past 24 hours)   CT Head w/o Contrast    Narrative    EXAM: CT HEAD W/O CONTRAST  LOCATION: [facility]  DATE: 2/10/2025    INDICATION: ams  COMPARISON: None.  TECHNIQUE: Routine CT Head without IV contrast. Multiplanar reformats. Dose reduction techniques were used.    FINDINGS:  INTRACRANIAL CONTENTS: No evidence of acute intracranial hemorrhage or mass effect. Scattered foci of decreased attenuation within the cerebral hemispheric white matter which are nonspecific, though most commonly ascribed to chronic small vessel ischemic   disease. The ventricles and sulci are normal for age. Normal gray-white matter differentiation. The basilar cisterns are patent.    VISUALIZED ORBITS/SINUSES/MASTOIDS: The globes are unremarkable. The partially imaged paranasal sinuses, mastoid air cells and middle ear cavities are unremarkable.     BONES/SOFT TISSUES: The visualized skull base and  calvarium are unremarkable.      Impression    IMPRESSION:    1.  No evidence of acute intracranial hemorrhage or mass effect.

## 2025-02-11 NOTE — PLAN OF CARE
Shift Summary [id number]:    HTN (received PRN hydralazine x1) otherwise VSS on 1L O2 NC. A/Ox2, disoriented to time and situation. More lethargic this afternoon. Pain managed w/ heat pack and meds (see MAR). LS dim, DEVRIES. Tele: SR w/ ST depression, denies CP. Anuric. LUE fistula WDL. No BM this shift, +BS and passing flatus. TF running at 55ml/hr (goal rate). 30ml FWF q4h. B,227,124. VBGs obtained (see results). Diet switched to NPO per SLP. Up A2 lift. Repo q2h. Pt refuses repos at times, education provided. HD tomm. Consulted teams: EP, Neuro, Neph. Discharge tbd. Care plan updated.     See flowsheet for full assessment. See MAR for meds given.    Yahir Oneill, RN  6:30 PM    Goal Outcome Evaluation:      Plan of Care Reviewed With: patient, family    Overall Patient Progress: improving    Outcome Evaluation: IM status continued. Amio gtt discontinued, started PO. Wean O2 as able. Sitter discontinued at 1530.       Problem: Gas Exchange Impaired  Goal: Optimal Gas Exchange  Outcome: Progressing     Problem: Enteral Nutrition  Goal: Absence of Aspiration Signs and Symptoms  Outcome: Not Progressing
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
## 2025-02-11 NOTE — PROGRESS NOTES
[facility]    Ms. Herr is a 74 yo with ESKD who presented 1/8 with acute SOB and Influenza A. This required ventilator management 1/9-1/18. Then reintubtaion 1/20-1/25. Hospital course has been complicated by episodes of Afib with RVR, escalation of her rate control mediations and subsequently bradycardia. Medications were held but afib with RVR recurred again 2/10.          Assessment/Plan:     ESKD: Plan for dialysis tomorrow. Orders in. I do have concerns that she will develop afib again while on dialysis. Appreciate cardiology input. Orders in.     2. Metabolic alkalosis: Will run on a lower bicarbonate bath tomorrow    3. Anemia: Hgb is 8.4. this is relatively stable over the past 3 days. She is on Mircera as an outpt. Here she is receiving 48076 units EPO with her runs. Check iron studies (added to prior draw)    4. Bone/Mineral: on sevelemir. Last Phos was low. Adding phos to prior draw.     Attestation:   I have reviewed today's relevant vital signs, notes, medications, labs and imaging. 35 min was spent reviewing this data and conveying plan to the team.     Alysia Roman MD MS   [employer]      Interval History:     Since coming off dialysis and being reloaded on amiodarone her rate has converted to sinus and her heart rates are in the 60's. Blood pressures have been in the 140's.     She says she no longer has chest pain or pressure. No nausea. No other symptoms.        Data:     CBC RESULTS:     Recent Labs   Lab 02/11/25  0507 02/10/25  0526 02/09/25  1807 02/09/25  0500 02/08/25  0801 02/07/25  0546 02/06/25  1020   WBC 5.1 4.9  --  3.9* 5.5 6.2 9.2   RBC 2.69* 2.70*  --  2.19* 2.30* 2.33* 2.40*   HGB 8.4* 8.4* 8.8* 6.9* 7.4* 7.4*  7.4* 7.7*   HCT 27.0* 27.3*  --  22.6* 23.7* 24.2* 25.0*    186  --  169 173 210 199     Basic Metabolic Panel:  Recent Labs   Lab 02/11/25  1209 02/11/25  0808 02/11/25  0507 02/11/25  0413 02/10/25  1954 02/10/25  1544  "02/10/25  0742 02/10/25  0526 02/09/25  0745 02/09/25  0500 02/08/25  0756 02/08/25  0453 02/07/25  0548 02/07/25  0546 02/07/25  0013 02/06/25  2048 02/06/25  0958 02/06/25  0836   NA  --   --  137  --   --   --   --  140  --  136  --  136  --  135  --   --   --  134*   POTASSIUM  --   --  4.4  --   --   --   --  4.8  --  4.3  --  4.9  --  4.3  --  4.0  --  5.7*   CHLORIDE  --   --  94*  --   --   --   --  100  --  98  --  97*  --  96*  --   --   --  94*   CO2  --   --  32*  --   --   --   --  27  --  31*  --  29  --  29  --   --   --  29   BUN  --   --  61.7*  --   --   --   --  95.9*  --  66.1*  --  77.9*  --  49.8*  --   --   --  77.9*   CR  --   --  3.04*  --   --   --   --  4.48*  --  3.34*  --  3.96*  --  2.79*  --   --   --  3.95*   * 232* 226* 190* 136* 174*   < > 215*   < > 228*   < > 204*   < > 150*   < > 86   < > 77   JODY  --   --  9.9  --   --   --   --  9.9  --  9.6  --  9.8  --  10.0  --   --   --  9.9    < > = values in this interval not displayed.     INRNo lab results found in last 7 days.          Physical Exam:     Vitals were reviewed     , Blood pressure (!) 161/66, pulse 75, temperature 98.3  F (36.8  C), resp. rate 24, height 1.702 m (5' 7.01\"), weight 97.3 kg (214 lb 8.1 oz), SpO2 97%, not currently breastfeeding.  Wt Readings from Last 3 Encounters:   02/09/25 97.3 kg (214 lb 8.1 oz)   06/14/24 101.1 kg (222 lb 14.2 oz)   06/03/24 101.1 kg (222 lb 14.2 oz)     Intake/Output Summary (Last 24 hours) at 2/11/2025 1437  Last data filed at 2/11/2025 0400  Gross per 24 hour   Intake 580 ml   Output --   Net 580 ml     Gen; Comfortable. NAD. Alert and oriented X 3 sitting in a chair  Eyes; pupils are reactive. Non icteric  Resp: CTA anterior and posterior  Card: regular with no rub or murmur. No extra heart sounds  Abd: soft,  non tender. Good bowel sounds  Ext: no edema  Neuro: no asterixis or myoclonus  Skin; no rashes or lesions  Access: L upper arm AVF has good thrill and bruit. L hand " is warm with good cap refill.            Medications and Allergies:     Current Facility-Administered Medications   Medication Dose Route Frequency Provider Last Rate Last Admin    - MEDICATION INSTRUCTIONS for Dialysis Patients -   Does not apply See Admin Instructions Braden Montana MD        amiodarone (PACERONE) tablet 200 mg  200 mg Oral BID Kamryn Selby PA-C   200 mg at 02/11/25 1257    apixaban ANTICOAGULANT (ELIQUIS) tablet 5 mg  5 mg Oral or Feeding Tube BID Denver Shipman MD   5 mg at 02/11/25 0954    atorvastatin (LIPITOR) tablet 20 mg  20 mg Oral or Feeding Tube QPM Denver Shipman MD   20 mg at 02/10/25 1956    B and C vitamin Complex with folic acid (NEPHRONEX) liquid 5 mL  5 mL Per Feeding Tube Daily Denver Shipman MD   5 mL at 02/11/25 0954    budesonide (PULMICORT) neb solution 1 mg  1 mg Nebulization BID Denver Shipman MD   1 mg at 02/11/25 0719    cetirizine (zyrTEC) tablet 5 mg  5 mg Oral or Feeding Tube At Bedtime Denver Shipman MD   5 mg at 02/10/25 2206    furosemide (LASIX) tablet 80 mg  80 mg Oral or Feeding Tube Daily Enu-Vivian Samaniego MD   80 mg at 02/11/25 0953    heparin lock flush 10 unit/mL injection 5-20 mL  5-20 mL Intracatheter Q24H Liset Romero MD   5 mL at 02/07/25 1208    insulin aspart (NovoLOG) injection (RAPID ACTING)  1-4 Units Subcutaneous Q4H Eren Mandel MD   1 Units at 02/11/25 1322    insulin glargine (LANTUS PEN) injection 15 Units  15 Units Subcutaneous BID Denver Shipman MD   15 Units at 02/11/25 1007    ipratropium (ATROVENT) 0.02 % neb solution 0.5 mg  0.5 mg Nebulization 4x daily Johanne Crowell MD   0.5 mg at 02/11/25 1412    levalbuterol (XOPENEX) neb solution 1.25 mg  1.25 mg Nebulization 4x daily Johanne Crowell MD   1.25 mg at 02/11/25 1412    miconazole (MICATIN) 2 % powder   Topical BID Denver Shipman MD   Given at 02/11/25 1202    midodrine (PROAMATINE) tablet 5 mg  5 mg Oral Once per day on Monday  Wednesday Friday Vivian Russell MD   5 mg at 02/10/25 0917    pantoprazole (PROTONIX) 2 mg/mL suspension 40 mg  40 [facility] Ship[facility]   40 mg at 02/11/25 1031    Or    pantoprazole (PROTONIX) IV push injection 40 mg  40 mg Intravenous QAM  Denver Shipman MD   40 mg at 02/10/25 0635    polyethylene glycol (MIRALAX) Packet 17 g  17 g Oral or Feeding Tube Daily Liset Romero MD   17 g at 02/06/25 0828    Prosource TF20 ENfit Compatibl EN LIQD (PROSOURCE TF20) packet 60 mL  1 packet Per Feeding Tube BID Vivian Russell MD   60 mL at 02/11/25 1031    QUEtiapine (SEROquel) tablet 50 mg  50 mg Oral or Feeding Tube At Bedtime Cheo Watt MD   50 mg at 02/10/25 2233    sennosides (SENOKOT) syrup 10 mL  10 mL Oral or Feeding Tube BID Liset Romero MD   10 mL at 02/09/25 0803    sertraline (ZOLOFT) tablet 75 mg  75 mg Oral or Feeding Tube QPM Denver Shipman MD   75 mg at 02/10/25 1956    [Held by provider] sevelamer carbonate (RENVELA) tablet 800 mg  800 mg Oral TID w/meals Denver Shipman MD   800 mg at 02/06/25 0828    tacrolimus (PROTOPIC) 0.1 % ointment   Topical BID Denver Shipman MD   Given at 02/11/25 1202    thiamine (B-1) injection 200 mg  200 mg Intravenous Daily Mich Noel MD   200 mg at 02/11/25 1329    vitamin D3 (CHOLECALCIFEROL) tablet 50 mcg  50 mcg Oral or Feeding Tube Daily Denver Shipman MD   50 mcg at 02/11/25 0954     Allergies   Allergen Reactions    Ampicillin-Sulbactam Sodium Rash     No evidence SJS, but very uncomfortable and precipitated multiple provider visits. Would not use penicillins again if other options available.     Penicillins Rash

## 2025-02-11 NOTE — PROVIDER NOTIFICATION
1608: Dr. Crowell paged for PRN BP meds. Pt's last /68.     Addendum: 1623: PRN IV hydralazine ordered.     3912: FYI page to Dr. Crowell regarding pt complaining of some new ringing and pressure in her R ear.

## 2025-02-11 NOTE — CONSULTS
Neurology Consult Note  [facility]          Patient Name: Supriya Herr  MRN: [id number]  : 1950  Date of consultation 2025        Reason for Consult:  Altered mental status     History of present illness:  Ms. Herr is a 75 year old With a past medical history of  of ESRD on HD, CHF, COPD, L AKA (w/c bound), DM2, & HTN, AFIB admitted 2025 for respiratory failure from influenza A, course complicated by rapid AFIB  and bradycardia and infection  who was admitted on 2025.  Neurology consulted for altered mental status.  Currently her mental status looks improved with by supportive therapy including antibiotics and by hemodialysis.  The patient and the sitter at bedside denies any seizure activity or staring episodes.  She had CT scan of the brain with no major finding.  She also had EEG yesterday showed moderate generalized slowing and triphasic waves which commonly seen in chronic kidney disease.  She admits that she does not eat well over the last month since admission,  At home she was taking multivitamins daily.      Problem List:   Patient Active Problem List   Diagnosis    Anemia    Vitiligo    Traumatic amputation of leg above knee (H)    Contact dermatitis and other eczema, due to unspecified cause    Dermatophytosis of nail    Generalized osteoarthrosis, unspecified site    Hypertension goal BP (blood pressure) < 140/90    Moderate nonproliferative diabetic retinopathy associated with type 2 diabetes mellitus (H)    Proteinuria    Stage I pressure ulcer    Hyperlipidemia LDL goal <100    Non compliance with medical treatment    Incontinence of urine    Basal cell carcinoma    Senile nuclear sclerosis    JONE (obstructive sleep apnea)    CHF (congestive heart failure) (H)    Type 2 diabetes mellitus with diabetic chronic kidney disease (H)    Moderate recurrent major depression (H)    Type 2 diabetes mellitus with diabetic neuropathy, with long-term  current use of insulin (H)    Macular cyst, hole, or pseudohole of retina    Traumatic amputation of left lower extremity above knee, subsequent encounter    Former tobacco use    Type 2 diabetes mellitus (H)    Dyslipidemia    Anemia in chronic kidney disease    CKD (chronic kidney disease) stage 5, GFR less than 15 ml/min (H)    Obesity (BMI 30-39.9)    Anxiety and depression    Cervical cancer screening    Diabetic foot infection (H)    Plantar ulcer of right foot with fat layer exposed (H)    Cellulitis    Intertrigo    Drug rash    Wheelchair bound    Combined forms of age-related cataract of right eye    Pseudophakia of left eye    Anemia, iron deficiency    Chronic kidney disease, stage 5, kidney failure (H)    Encounter regarding vascular access for dialysis for ESRD (H)    Postoperative nausea    PVD (peripheral vascular disease)    ESRD on dialysis (H)    Encounter regarding vascular access for dialysis for end-stage renal disease (H)    Encounter for adjustment and management of vascular access device    ESRD (end stage renal disease) (H)    Steal syndrome as complication of dialysis access    Nausea    Infection due to 2019 novel coronavirus    Pneumonia due to COVID-19 virus    Hyperkalemia    ESRD (end stage renal disease) on dialysis (H)    Pneumonia of right lower lobe due to infectious organism    Hypervolemia, unspecified hypervolemia type    Major depressive disorder, recurrent    Class 2 severe obesity due to excess calories with serious comorbidity in adult (H)    Influenza A    Elevated troponin    Abnormal chest x-ray    Acute and chronic respiratory failure with hypoxia (H)    Anemia, unspecified type    Acute metabolic encephalopathy    Severe sepsis with acute organ dysfunction (H)    Pleural effusion       Past Surgical History:  Past Surgical History:   Procedure Laterality Date    AMPUTATION      left leg AKA    CATARACT IOL, RT/LT Left     CATARACT IOL, RT/LT Right 08/11/2020    + phaco     COLONOSCOPY N/A 6/13/2018    Procedure: COLONOSCOPY;  colonoscopy ;  Surgeon: Barry Morel MD;  Location: [facility]    CREATE FISTULA ARTERIOVENOUS UPPER EXTREMITY Right 11/16/2020    Procedure: CREATION, ARTERIOVENOUS FISTULA, UPPER EXTREMITY WITH INTRAOPERATIVE ULTRASOUND;  Surgeon: Kennedy Banks MD;  Location: [facility]    CREATE GRAFT ARTERIOVENOUS UPPER EXTREMITY BOVINE Left 5/7/2020    Procedure: Left upper arm brachial artery to axillary vein arteriovenous bovine graft creation with intraoperative ultrasound;  Surgeon: Angelita Martin MD;  Location: [facility]    EXCISE EXOSTOSIS FOOT Right 9/26/2018    Procedure: EXCISE EXOSTOSIS FOOT;;  Surgeon: Alvaro Gautam MD;  Location: [facility]    EYE SURGERY  Feb 2012    Repair of hole in left retina    IR DIALYSIS FISTULOGRAM LEFT  7/13/2020    IR DIALYSIS FISTULOGRAM LEFT  9/25/2020    IR DIALYSIS FISTULOGRAM LEFT  10/1/2020    IR DIALYSIS FISTULOGRAM LEFT  4/24/2024    IR DIALYSIS MECH THROMB W/STENT  9/25/2020    IR DIALYSIS PTA  7/13/2020    IR DIALYSIS PTA  10/1/2020    LIGATE FISTULA ARTERIOVENOUS UPPER EXTREMITY Right 2/2/2022    Procedure: Right Upper extremity arteriovenous Fistula Ligation;  Surgeon: Kennedy Banks MD;  [facility]    PHACOEMULSIFICATION CLEAR CORNEA WITH STANDARD INTRAOCULAR LENS IMPLANT Right 8/11/2020    Procedure: PHACOEMULSIFICATION, CATARACT, WITH INTRAOCULAR LENS IMPLANT;  Surgeon: Leanne Jett MD;  [facility]    PHACOEMULSIFICATION WITH STANDARD INTRAOCULAR LENS IMPLANT  5/6/13    left    PHACOEMULSIFICATION WITH STANDARD INTRAOCULAR LENS IMPLANT  5/6/2013    Procedure: PHACOEMULSIFICATION WITH STANDARD INTRAOCULAR LENS IMPLANT;  Left Kelman Phacoemulsification with Intraocular Lens Implant;  Surgeon: Mat Valdes MD;  Location: [facility]    RELEASE TRIGGER FINGER  6/27/2014    Procedure: RELEASE TRIGGER FINGER;  Surgeon: Santi Pedraza MD;  Location: [facility]     REMOVE HARDWARE FOOT Right 2018    Procedure: REMOVE HARDWARE FOOT;  Right Foot Removal Of Hardware, Sesamoidectomy With Second Metatarsal Head Excision ;  Surgeon: Alvaro Gautam MD;  Location: [facility]    REPAIR FISTULA ARTERIOVENOUS UPPER EXTREMITY Right 2021    Procedure: Banding of right upper arm arteriovenous fistula;  Surgeon: Kennedy Banks MD;  [facility]: [facility]    RETINAL REATTACHMENT Left     SURGICAL HISTORY OF -       amputation above left knee    SURGICAL HISTORY OF -       right foot, open reduction and pinning    SURGICAL HISTORY OF -       pinning right hip    SURGICAL HISTORY OF -       colon screening declined     Medications:  No current outpatient medications on file.     Allergies:  Allergies   Allergen Reactions    Ampicillin-Sulbactam Sodium Rash     No evidence SJS, but very uncomfortable and precipitated multiple provider visits. Would not use penicillins again if other options available.     Penicillins Rash     Family History   Problem Relation Age of Onset    Diabetes Mother     Hypertension Mother     Eye Disorder Mother     Arthritis Mother     Obesity Mother     Heart Failure Mother          of congestive heart failure    Deep Vein Thrombosis Mother     Snoring Mother     Cerebrovascular Disease Father     Arthritis Father     Heart Failure Father          from CHF    Pacemaker Sister     Arthritis Sister     LUNG DISEASE Brother     Other - See Comments Brother     Cancer Brother         unknown type, possibly pancreatic    Other - See Comments Brother         polio    Musculoskeletal Disorder Other         has MS    Thyroid Disease Other     Eye Disorder Other         cataracts    Cancer Other         throat/liver    Skin Cancer No family hx of     Melanoma No family hx of     Glaucoma No family hx of     Macular Degeneration No family hx of     Anesthesia Reaction No family hx of      Social History:  Social History  "    Socioeconomic History    Marital status:      Spouse name: Not on file    Number of children: 1    Years of education: Not on file    Highest education level: Not on file   Occupational History     Employer: DISABLED   Tobacco Use    Smoking status: Former     Current packs/day: 0.00     Average packs/day: 0.5 packs/day for 53.8 years (26.9 ttl pk-yrs)     Types: Cigarettes     Start date: 1964     Quit date: 2017     Years since quittin.2    Smokeless tobacco: Never    Tobacco comments:     1 per day or less   Vaping Use    Vaping status: Never Used   Substance and Sexual Activity    Alcohol use: No    Drug use: No    Sexual activity: Never     Partners: Male   Other Topics Concern    Parent/sibling w/ CABG, MI or angioplasty before 65F 55M? No   Social History Narrative    Lives with daughter in [place] in the lower level.  Has a five year old grandson.              Vital Signs:  BP (!) 161/66 (BP Location: Left arm)   Pulse 75   Temp 98.2  F (36.8  C) (Oral)   Resp 24   Ht 1.702 m (5' 7.01\")   Wt 97.3 kg (214 lb 8.1 oz)   LMP  (LMP Unknown)   SpO2 97%   BMI 33.59 kg/m        Neurological examination  Mental Status: The patient is confused   Place  Person   Time     Cranial Nerve II: Equally round and reactive to light.  Cranial Nerves III, IV, VI: The extraocular movements are full in all directions of gaze without  ophthalmoplegia or nystagmus.  Cranial Nerve V: Light touch is intact and symmetric in the V1, V2, V3 divisions of both  trigeminal nerves.  Cranial Nerve VII: Facial movements are symmetric.    Motor: Muscle Strength: moving all muscles upper and lower extremities bilaterally  purposefully against gravity.  Reflexes: The reflexes are 2/4 for biceps, patellar tendon reflexes bilaterally and  symmetrically.  Sensory: The sensory examination is normal for light touch bilaterally and symmetrically.  Cerebellar: moving the upper extremities in good coordination     Review " of Diagnostics:  I personally reviewed and interpreted the following:    EEG Awake or Drowsy Routine    Result Date: 2/10/2025  Name: Supriya Herr MRN: [id number] : 1950 Ordering provider: Roshan Arriaga. Date of service: 02/10/25 EEG #: FSH  Medications: [unfilled] [unfilled] Reason for study: encephalopathy , twitches FINDINGS: The awake EEG background was characterized by  continuous admixture of theta and delta frequencies with a symmetric intermittently seen 7 Hz posterior dominant rhythm (PDR) and not well preserved anterior to posterior gradient. Activation procedures: Photic stimulation was performed between 2-30 Hz and was not associated with any abnormal background changes; normal driving response was not seen. Hyperventilation was not performed. moderate generalized slowing characterized by intermittent polymorphic theta and delta frequencies was seen during this recording. Triphasic waves seen through out the recording . No epileptiform discharges were seen. IMPRESSION: This is abnormal routine awake, drowsy EEG due to the presence of moderate generalized slowing of the background indicative of diffuse cerebral dysfunction (encephalopathy) seen in toxic, metabolic conditions, diffuse structural cerebral abnormalities. Also triphasic waves seen , which usually associated with renal failure . This abnormal routine EEG neither refutes nor confirms diagnosis of seizures or epilepsy. Dr Noel     CT Head w/o Contrast    Result Date: 2/10/2025  EXAM: CT HEAD W/O CONTRAST LOCATION: [facility] DATE: 2/10/2025 INDICATION: ams COMPARISON: None. TECHNIQUE: Routine CT Head without IV contrast. Multiplanar reformats. Dose reduction techniques were used. FINDINGS: INTRACRANIAL CONTENTS: No evidence of acute intracranial hemorrhage or mass effect. Scattered foci of decreased attenuation within the cerebral hemispheric white matter which are  nonspecific, though most commonly ascribed to chronic small vessel ischemic  disease. The ventricles and sulci are normal for age. Normal gray-white matter differentiation. The basilar cisterns are patent. VISUALIZED ORBITS/SINUSES/MASTOIDS: The globes are unremarkable. The partially imaged paranasal sinuses, mastoid air cells and middle ear cavities are unremarkable. BONES/SOFT TISSUES: The visualized skull base and calvarium are unremarkable.     IMPRESSION:  1.  No evidence of acute intracranial hemorrhage or mass effect.        CT Chest/Abdomen/Pelvis w Contrast    Result Date: 2/2/2025      IMPRESSION: 1.  New right upper lobe airspace disease compatible with pneumonia. Increasing bilateral pleural effusions. 2.  Large fecal burden in the rectosigmoid. 3.  Nonspecific mediastinal and retroperitoneal adenopathy. 4.  Advanced degenerative changes in the right hip.                  CT Head w/o Contrast    Result Date: 1/20/2025      IMPRESSION: 1.  No evidence of intracranial hemorrhage or other acute intracranial abnormality. 2.  Mild cerebral volume loss and presumed changes of chronic microvascular disease. 3.  Mucosal thickening and fluid level within the right maxillary sinus. Correlate for acute sinusitis.          Vitamin B12:   Lab Results   Component Value Date    B12 815 01/19/2025    B12 919 11/05/2017         Complete Blood Count:    Recent Labs   Lab Test 02/11/25  0507 02/10/25  0526 02/09/25  1807 02/09/25  0500 02/06/25  1020 02/05/25  1736 01/31/25  0607 01/30/25  0540 01/09/25  0734 01/08/25  0742   WBC 5.1 4.9  --  3.9*   < > 6.5   < > 5.3   < > 4.0   RBC 2.69* 2.70*  --  2.19*   < > 2.25*   < > 2.37*   < > 2.90*   HGB 8.4* 8.4* 8.8* 6.9*   < > 7.2*   < > 7.4*   < > 8.8*   HCT 27.0* 27.3*  --  22.6*   < > 23.5*   < > 24.5*   < > 29.3*    186  --  169   < > 161   < > 159   < > 148*   LYMPH  --   --   --   --   --  10  --  13  --  22    < > = values in this interval not displayed.         HgA1c:   Lab Results   Component Value Date    A1C 6.5 (H) 01/17/2025        Thyroid Stimulating Hormone:   Lab Results   Component Value Date    TSH 5.67 02/05/2025    TSH 2.93 01/17/2020        Recent Labs   Lab Test 02/11/25  0507 02/10/25  0526 06/03/24  1644 04/24/24  0913 08/16/21  1816 04/16/21  0800   WBC 5.1 4.9   < > 6.3   < > 6.4   HGB 8.4* 8.4*   < > 10.1*   < > 10.9*    101*   < > 101*   < > 98    186   < > 139*   < > 194   INR  --   --   --  1.11  --  1.14    < > = values in this interval not displayed.      Recent Labs   Lab Test 02/11/25  0808 02/11/25  0507 02/10/25  0742 02/10/25  0526   NA  --  137  --  140   POTASSIUM  --  4.4  --  4.8   CHLORIDE  --  94*  --  100   CO2  --  32*  --  27   BUN  --  61.7*  --  95.9*   CR  --  3.04*  --  4.48*   ANIONGAP  --  11  --  13   JODY  --  9.9  --  9.9   * 226*   < > 215*    < > = values in this interval not displayed.     CMP:  Recent Labs   Lab 02/05/25 2004   PROTTOTAL 6.4   ALBUMIN 3.5   ALKPHOS 75   AST 17   ALT 24   BILITOTAL 0.3       Impression and Plan:  Ms. Herr is a 75 year old With a past medical history of  of ESRD on HD, CHF, COPD, L AKA (w/c bound), DM2, & HTN, AFIB admitted 1/8/2025 for respiratory failure from influenza A, course complicated by rapid AFIB  and bradycardia and infection  who was admitted on 1/8/2025.  Neurology consulted for altered mental status.  Currently her mental status looks improved with by supportive therapy including antibiotics and by hemodialysis.  The patient and the sitter at bedside denies any seizure activity or staring episodes.  She had CT scan of the brain with no major finding.  She had EEG yesterday showed moderate generalized slowing and triphasic waves which commonly seen in chronic kidney disease.    DDX: likely toxic metabolic encephalopathy   Twitching has improved a lot, it is likely due to chronic kidney disease and medications and infections.  She admits that she does  not eat well over the last month since admission,  At home she was taking multivitamins daily.  I ordered IV thiamine supplements 200 mg daily for 5 days okay to adjust dose as better pharmacy and the primary team.    Continue supportive therapy as per primary team and nephrology    No further neurology recommendations will sign off   Please page or call me with any questions    Mich Noel MD  Neurology    Thank you very much for allowing me to participate in the care of this patient.    Total time of visit: 55 minutes with the time spent on chart review, imaging and lab results review, and more than 50 % of the time spent on coordination of cares and face-to-face time with the patient including counseling regarding pathophysiology of the above conditions, results of the tests and further directions of care.

## 2025-02-12 ENCOUNTER — PRE VISIT (OUTPATIENT)
Dept: OTOLARYNGOLOGY | Facility: CLINIC | Age: 75
End: 2025-02-12

## 2025-02-12 LAB
ANION GAP SERPL CALCULATED.3IONS-SCNC: 14 MMOL/L (ref 7–15)
BASE EXCESS BLDV CALC-SCNC: 7.7 MMOL/L (ref -3–3)
BASE EXCESS BLDV CALC-SCNC: 9 MMOL/L (ref -3–3)
BUN SERPL-MCNC: 95.9 MG/DL (ref 8–23)
CALCIUM SERPL-MCNC: 10.2 MG/DL (ref 8.8–10.4)
CHLORIDE SERPL-SCNC: 93 MMOL/L (ref 98–107)
CREAT SERPL-MCNC: 4.28 MG/DL (ref 0.51–0.95)
EGFRCR SERPLBLD CKD-EPI 2021: 10 ML/MIN/1.73M2
ERYTHROCYTE [DISTWIDTH] IN BLOOD BY AUTOMATED COUNT: 16.3 % (ref 10–15)
GLUCOSE BLDC GLUCOMTR-MCNC: 102 MG/DL (ref 70–99)
GLUCOSE BLDC GLUCOMTR-MCNC: 122 MG/DL (ref 70–99)
GLUCOSE BLDC GLUCOMTR-MCNC: 135 MG/DL (ref 70–99)
GLUCOSE BLDC GLUCOMTR-MCNC: 142 MG/DL (ref 70–99)
GLUCOSE BLDC GLUCOMTR-MCNC: 158 MG/DL (ref 70–99)
GLUCOSE BLDC GLUCOMTR-MCNC: 176 MG/DL (ref 70–99)
GLUCOSE BLDC GLUCOMTR-MCNC: 194 MG/DL (ref 70–99)
GLUCOSE SERPL-MCNC: 183 MG/DL (ref 70–99)
HCO3 BLDV-SCNC: 33 MMOL/L (ref 21–28)
HCO3 BLDV-SCNC: 34 MMOL/L (ref 21–28)
HCO3 SERPL-SCNC: 30 MMOL/L (ref 22–29)
HCT VFR BLD AUTO: 28.7 % (ref 35–47)
HGB BLD-MCNC: 8.9 G/DL (ref 11.7–15.7)
MAGNESIUM SERPL-MCNC: 2.7 MG/DL (ref 1.7–2.3)
MCH RBC QN AUTO: 30.4 PG (ref 26.5–33)
MCHC RBC AUTO-ENTMCNC: 31 G/DL (ref 31.5–36.5)
MCV RBC AUTO: 98 FL (ref 78–100)
O2/TOTAL GAS SETTING VFR VENT: 0 %
O2/TOTAL GAS SETTING VFR VENT: 0 %
OXYHGB MFR BLDV: 80 % (ref 70–75)
OXYHGB MFR BLDV: 80 % (ref 70–75)
PCO2 BLDV: 51 MM HG (ref 40–50)
PCO2 BLDV: 51 MM HG (ref 40–50)
PH BLDV: 7.42 [PH] (ref 7.32–7.43)
PH BLDV: 7.44 [PH] (ref 7.32–7.43)
PHOSPHATE SERPL-MCNC: 4.8 MG/DL (ref 2.5–4.5)
PLATELET # BLD AUTO: 173 10E3/UL (ref 150–450)
PO2 BLDV: 47 MM HG (ref 25–47)
PO2 BLDV: 50 MM HG (ref 25–47)
POTASSIUM SERPL-SCNC: 4.8 MMOL/L (ref 3.4–5.3)
RBC # BLD AUTO: 2.93 10E6/UL (ref 3.8–5.2)
SAO2 % BLDV: 81.9 % (ref 70–75)
SAO2 % BLDV: 82.2 % (ref 70–75)
SODIUM SERPL-SCNC: 137 MMOL/L (ref 135–145)
WBC # BLD AUTO: 5.2 10E3/UL (ref 4–11)

## 2025-02-12 PROCEDURE — 250N000009 HC RX 250: Performed by: HOSPITALIST

## 2025-02-12 PROCEDURE — 250N000013 HC RX MED GY IP 250 OP 250 PS 637: Performed by: HOSPITALIST

## 2025-02-12 PROCEDURE — 999N000157 HC STATISTIC RCP TIME EA 10 MIN

## 2025-02-12 PROCEDURE — 80048 BASIC METABOLIC PNL TOTAL CA: CPT | Performed by: HOSPITALIST

## 2025-02-12 PROCEDURE — 250N000013 HC RX MED GY IP 250 OP 250 PS 637: Performed by: INTERNAL MEDICINE

## 2025-02-12 PROCEDURE — 250N000011 HC RX IP 250 OP 636: Performed by: HOSPITALIST

## 2025-02-12 PROCEDURE — 99232 SBSQ HOSP IP/OBS MODERATE 35: CPT | Performed by: HOSPITALIST

## 2025-02-12 PROCEDURE — 94640 AIRWAY INHALATION TREATMENT: CPT

## 2025-02-12 PROCEDURE — 82805 BLOOD GASES W/O2 SATURATION: CPT | Performed by: HOSPITALIST

## 2025-02-12 PROCEDURE — 120N000013 HC R&B IMCU

## 2025-02-12 PROCEDURE — 84100 ASSAY OF PHOSPHORUS: CPT | Performed by: HOSPITALIST

## 2025-02-12 PROCEDURE — 36415 COLL VENOUS BLD VENIPUNCTURE: CPT | Performed by: HOSPITALIST

## 2025-02-12 PROCEDURE — 82435 ASSAY OF BLOOD CHLORIDE: CPT | Performed by: HOSPITALIST

## 2025-02-12 PROCEDURE — 83735 ASSAY OF MAGNESIUM: CPT | Performed by: HOSPITALIST

## 2025-02-12 PROCEDURE — 94640 AIRWAY INHALATION TREATMENT: CPT | Mod: 76

## 2025-02-12 PROCEDURE — 250N000012 HC RX MED GY IP 250 OP 636 PS 637: Performed by: HOSPITALIST

## 2025-02-12 PROCEDURE — 85027 COMPLETE CBC AUTOMATED: CPT | Performed by: INTERNAL MEDICINE

## 2025-02-12 PROCEDURE — 250N000013 HC RX MED GY IP 250 OP 250 PS 637: Performed by: PHYSICIAN ASSISTANT

## 2025-02-12 PROCEDURE — 250N000011 HC RX IP 250 OP 636: Performed by: PSYCHIATRY & NEUROLOGY

## 2025-02-12 RX ORDER — AMINO ACIDS/PROTEIN HYDROLYS 11G-40/45
1 LIQUID IN PACKET (ML) ORAL DAILY
Status: DISCONTINUED | OUTPATIENT
Start: 2025-02-13 | End: 2025-02-26 | Stop reason: HOSPADM

## 2025-02-12 RX ORDER — AMLODIPINE BESYLATE 5 MG/1
5 TABLET ORAL 2 TIMES DAILY
Status: DISCONTINUED | OUTPATIENT
Start: 2025-02-12 | End: 2025-02-19

## 2025-02-12 RX ORDER — HYDRALAZINE HYDROCHLORIDE 25 MG/1
25 TABLET, FILM COATED ORAL EVERY 8 HOURS SCHEDULED
Status: DISCONTINUED | OUTPATIENT
Start: 2025-02-12 | End: 2025-02-13

## 2025-02-12 RX ADMIN — AMIODARONE HYDROCHLORIDE 200 MG: 200 TABLET ORAL at 17:02

## 2025-02-12 RX ADMIN — Medication 5 ML: at 17:02

## 2025-02-12 RX ADMIN — TACROLIMUS: 1 OINTMENT TOPICAL at 09:02

## 2025-02-12 RX ADMIN — LEVALBUTEROL HYDROCHLORIDE 1.25 MG: 1.25 SOLUTION RESPIRATORY (INHALATION) at 19:43

## 2025-02-12 RX ADMIN — INSULIN ASPART 1 UNITS: 100 INJECTION, SOLUTION INTRAVENOUS; SUBCUTANEOUS at 20:50

## 2025-02-12 RX ADMIN — QUETIAPINE FUMARATE 50 MG: 50 TABLET ORAL at 23:07

## 2025-02-12 RX ADMIN — MICONAZOLE NITRATE: 2 POWDER TOPICAL at 09:03

## 2025-02-12 RX ADMIN — MICONAZOLE NITRATE: 2 POWDER TOPICAL at 21:36

## 2025-02-12 RX ADMIN — Medication 40 MG: at 08:53

## 2025-02-12 RX ADMIN — SERTRALINE HYDROCHLORIDE 75 MG: 50 TABLET ORAL at 20:56

## 2025-02-12 RX ADMIN — APIXABAN 5 MG: 5 TABLET, FILM COATED ORAL at 08:53

## 2025-02-12 RX ADMIN — IPRATROPIUM BROMIDE 0.5 MG: 0.5 SOLUTION RESPIRATORY (INHALATION) at 19:43

## 2025-02-12 RX ADMIN — APIXABAN 5 MG: 5 TABLET, FILM COATED ORAL at 20:56

## 2025-02-12 RX ADMIN — INSULIN ASPART 1 UNITS: 100 INJECTION, SOLUTION INTRAVENOUS; SUBCUTANEOUS at 01:00

## 2025-02-12 RX ADMIN — INSULIN GLARGINE 15 UNITS: 100 INJECTION, SOLUTION SUBCUTANEOUS at 20:51

## 2025-02-12 RX ADMIN — THIAMINE HYDROCHLORIDE 200 MG: 100 INJECTION, SOLUTION INTRAMUSCULAR; INTRAVENOUS at 08:52

## 2025-02-12 RX ADMIN — BUDESONIDE 1 MG: 1 INHALANT ORAL at 06:28

## 2025-02-12 RX ADMIN — IPRATROPIUM BROMIDE 0.5 MG: 0.5 SOLUTION RESPIRATORY (INHALATION) at 06:15

## 2025-02-12 RX ADMIN — INSULIN ASPART 1 UNITS: 100 INJECTION, SOLUTION INTRAVENOUS; SUBCUTANEOUS at 23:36

## 2025-02-12 RX ADMIN — CARBAMIDE PEROXIDE 6.5% 5 DROP: 6.5 LIQUID AURICULAR (OTIC) at 21:40

## 2025-02-12 RX ADMIN — Medication 50 MCG: at 08:53

## 2025-02-12 RX ADMIN — HYDRALAZINE HYDROCHLORIDE 25 MG: 25 TABLET ORAL at 23:06

## 2025-02-12 RX ADMIN — TACROLIMUS: 1 OINTMENT TOPICAL at 21:38

## 2025-02-12 RX ADMIN — Medication 60 ML: at 11:32

## 2025-02-12 RX ADMIN — AMIODARONE HYDROCHLORIDE 200 MG: 200 TABLET ORAL at 23:06

## 2025-02-12 RX ADMIN — INSULIN ASPART 1 UNITS: 100 INJECTION, SOLUTION INTRAVENOUS; SUBCUTANEOUS at 04:37

## 2025-02-12 RX ADMIN — INSULIN GLARGINE 15 UNITS: 100 INJECTION, SOLUTION SUBCUTANEOUS at 08:58

## 2025-02-12 RX ADMIN — AMLODIPINE BESYLATE 5 MG: 5 TABLET ORAL at 18:07

## 2025-02-12 RX ADMIN — CETIRIZINE HYDROCHLORIDE 5 MG: 5 TABLET ORAL at 23:06

## 2025-02-12 RX ADMIN — LEVALBUTEROL HYDROCHLORIDE 1.25 MG: 1.25 SOLUTION RESPIRATORY (INHALATION) at 06:15

## 2025-02-12 RX ADMIN — LEVALBUTEROL HYDROCHLORIDE 1.25 MG: 1.25 SOLUTION RESPIRATORY (INHALATION) at 10:32

## 2025-02-12 RX ADMIN — SENNOSIDES 10 ML: 8.8 LIQUID ORAL at 23:06

## 2025-02-12 RX ADMIN — HYDRALAZINE HYDROCHLORIDE 5 MG: 20 INJECTION INTRAMUSCULAR; INTRAVENOUS at 06:14

## 2025-02-12 RX ADMIN — HYDRALAZINE HYDROCHLORIDE 5 MG: 20 INJECTION INTRAMUSCULAR; INTRAVENOUS at 18:07

## 2025-02-12 RX ADMIN — ATORVASTATIN CALCIUM 20 MG: 20 TABLET, FILM COATED ORAL at 20:56

## 2025-02-12 RX ADMIN — IPRATROPIUM BROMIDE 0.5 MG: 0.5 SOLUTION RESPIRATORY (INHALATION) at 10:32

## 2025-02-12 ASSESSMENT — ACTIVITIES OF DAILY LIVING (ADL)
ADLS_ACUITY_SCORE: 85
ADLS_ACUITY_SCORE: 85
ADLS_ACUITY_SCORE: 81
ADLS_ACUITY_SCORE: 85
ADLS_ACUITY_SCORE: 87
ADLS_ACUITY_SCORE: 87
ADLS_ACUITY_SCORE: 85
ADLS_ACUITY_SCORE: 87
ADLS_ACUITY_SCORE: 85
ADLS_ACUITY_SCORE: 87
ADLS_ACUITY_SCORE: 85
ADLS_ACUITY_SCORE: 81
ADLS_ACUITY_SCORE: 85
ADLS_ACUITY_SCORE: 85

## 2025-02-12 NOTE — PROGRESS NOTES
"Potassium   Date Value Ref Range Status   02/12/2025 4.8 3.4 - 5.3 mmol/L Final   02/02/2022 4.7 3.4 - 5.3 mmol/L Final   04/17/2021 4.2 3.4 - 5.3 mmol/L Final     Hemoglobin   Date Value Ref Range Status   02/12/2025 8.9 (L) 11.7 - 15.7 g/dL Final   04/16/2021 10.9 (L) 11.7 - 15.7 g/dL Final     Creatinine   Date Value Ref Range Status   02/12/2025 4.28 (H) 0.51 - 0.95 mg/dL Final   04/17/2021 5.98 (H) 0.52 - 1.04 mg/dL Final     Urea Nitrogen   Date Value Ref Range Status   02/12/2025 95.9 (H) 8.0 - 23.0 mg/dL Final   11/24/2021 37 (H) 7 - 30 mg/dL Final   04/17/2021 68 (H) 7 - 30 mg/dL Final     Sodium   Date Value Ref Range Status   02/12/2025 137 135 - 145 mmol/L Final   04/17/2021 137 133 - 144 mmol/L Final     INR   Date Value Ref Range Status   04/24/2024 1.11 0.85 - 1.15 Final   04/16/2021 1.14 0.86 - 1.14 Final       DIALYSIS PROCEDURE NOTE  Hepatitis status of previous patient on machine log was checked and verified ok to use with this patients hepatitis status.  Patient dialyzed for 3 hrs. on a K2 bath with a net fluid removal of  0.8L.  A BFR of 450 ml/min was obtained via a LUE AVF using 15 gauge needles.      The treatment plan was discussed with Dr. Roman during the treatment.    Total heparin received during the treatment: 0 units.   Needle cannulation sites held x 10 min.     Meds  given: None ordered     Complications: Pt became very anxious with 30 min left on run and asked to come off so she can \"go home\".  I informed her that we only had 30 min left and that she would just be going back to her room and that we can't send her home.  She became increasingly upset after hearing this and demanded to come off.  I told her we could be done and she calmed down.  MD Roman informed.    ICEBOAT? Timeout performed pre-treatment  I: Patient was identified using 2 identifiers  C:  Consent Signed Yes  E: Equipment preventative maintenance is current and dialysis delivery system OK to use  B: Hepatitis B " Surface Antigen: Negative; Draw Date: 2/6/25  O: Dialysis orders present and complete prior to treatment  A: Vascular access verified and assessed prior to treatment  T: Treatment was performed at a clinically appropriate time  ?: Patient was allowed to ask questions and address concerns prior to treatment  See Adult Hemodialysis flowsheet in EPIC for further details and post assessment.  Machine water alarm in place and functioning. Transducer pods intact and checked every 15min.   Pt returned via Bed.  Chlorine/Chloramine water system checked every 4 hours.      Patient repositioned every 2 hours during the treatment.  Post treatment report given to MABEL Mcdonald RN regarding 0.8L of fluid removed, last BP of 180/73, and patient pain rating of 8/10.

## 2025-02-12 NOTE — PROGRESS NOTES
SPIRITUAL HEALTH SERVICES  SPIRITUAL ASSESSMENT Progress Note  [facility] [place]     REFERRAL SOURCE: Follow up visit per patient request for ongoing support    Supriya was lying in bed upon arrival. She declined a visit at this time, indicating she would prefer a visit at another time.    PLAN: Will follow up in 1-2 days. Please consult if more urgent needs arise or as requested by patient or loved ones.    Cindy Pak  Associate      SHS available 24/7 for emergent requests/referrals, either by paging the on-call  or by entering an ASAP/STAT consult in Epic (this will also page the on-call ).

## 2025-02-12 NOTE — PLAN OF CARE
Goal Outcome Evaluation:    Orientations: A&O x2-3  Vitals/Pain: VSS on 2 L NC, intermittent Bipap 30 %  PRN tylenol x1 for generalized pain  Tele: SR  Lines/Drains: PIV x1 SL  Skin/Wounds: Rash to breast/abd/groin folds - medications applied, blanchable redness to sacrum/coccyx  GI/: anuric on HD, BM x2  NPO, Nocturnal TF via NG at 55ml/hr over night  Labs: Cr 4.28  Ambulation/Assist: Not OOB, up with lift, turn/repo Q2H  Sleep Quality: good  Plan: dialysis today     Third attempt:    RN called patient via Bulgarian  and LVM to call clinic at [phone].      If patient calls back, please relay provider message below. Please also assist in scheduling lab appointment in one month to recheck hepatic tests.     Olga Akhtar, LUPILLON, RN   [facility]

## 2025-02-12 NOTE — PROGRESS NOTES
EP chart check / quick note:  Reviewed patient's telemetry, over the past 24 hours she has remained in sinus rhythm with no significant bradycardia.  Would continue the current therapies and recommendations as per 2/11 consult note. EP will sign off, please recall with concerns.     [facility]

## 2025-02-12 NOTE — PROGRESS NOTES
[facility]    Medicine Progress Note - Hospitalist Service    Date of Admission:  1/8/2025    Assessment & Plan   Supriya Herr is a 74 year old female with PMHx significant for ESRD on HD, CHF, COPD, poor mobility (Lt AKA, Obesity, WC bound), DM type II, hypertension.  She was brought to the ER by EMS for evaluation of shortness of breath, diagnosed with influenza A and admitted on 1/8/25      Hospital course summary  Patient was admitted, subsequently intubated for worsening respiratory failure and remained on mechanical ventilator 1/9 - 1/18.  She was reintubated and remained on mechanical ventilator 1/20 - 1/25.  Was treated with Tamiflu, IV antibiotic, steroid, has completed courses.  Hospital course complicated by A-fib RVR.  Was on amiodarone drip with eventual transition to p.o. Also required diltiazem  for rate control.  Required BiPAP--HFNC--intermittent BiPAP use.  Ongoing tube feeding and modified diet per SLP.  Delirium managed with Seroquel.  Concern for recurrent pneumonia, hospital-acquired on 2/1, restarted on antibiotics.  ID consulted, recommended discontinuing antibiotics, respiratory failure likely related to fluid overload.    Patient developed symptomatic bradycardia on 2/5/25 triggering a RRT. She was placed on dopamine drip and transferred back to the ICU. Bradycardia was likely due to rate controlling medications. Cardiology consulted with adjustment of medications. Patient has remained in NSR without recurrence of bradycardia on amio drip. While in the ICU, patient required dialysis on pressors, now transitioned to midodrine . Hypoxic and hypercarbic resp failure is much improved with dialysis.     Patient transferred back to hospitalist service on 2/8/25.      Acute hypoxic respiratory failure-improving  Influenza A pneumonia  Acute COPD exacerbation  Hospital-acquired pneumonia  Pharyngeal edema  -Initial presentation with shortness of breath, diagnosed  influenza A. Initial course as noted above.  -Had bronchoscopy and BAL 1/20, culture grew actinomyces, CT chest showed RML infiltrate versus atelectasis but no sign of actinomycosis per intensivist and felt this was likely colonization.    -Pneumonia treated with cefepime and vancomycin, cefepime changed to Levaquin and completed antibiotic course 1/26.  -Was treated with IV steroid and Tamiflu and completed the course  -2/1 given increased oxygen need during earlier course of hospital stay there was concern about infection again and blood cultures done on 2/1 have been negative and Pro-Geraldo was elevated and CT chest was concerning for right lower lobe pneumonia and ID was consulted and patient was again started on vancomycin and Levaquin  -ID was consulted and they recommended to stop antibiotics and respiratory failure was likely due to fluid overload and patient was improving with dialysis  -VBG reviewed this a.m. which shows pH of 7.42 with pCO2 of 51 and pO2 of 47 and bicarb of 33  -Was on BiPAP but when I examined her she was alert oriented x 2-3 and was weaned off to nasal cannula 1 to 2 L not look to be in any respiratory distress  -Continue nebs-budesonide 1 Mg twice daily, Neb 4 times daily,       Recurrent atrial fibrillation with RVR now in normal sinus rhythm  Acute on chronic CHFpEF  Hypertension  Shock, due to sedation-resolved  Symptomatic bradycardia-Resolved  -PTA is on amlodipine 5 Mg twice daily, Coreg 25 Mg p.o. twice daily and Lasix.  -TTE this is stay shows LVEF 60%, no pericardial effusion, no significant valvular disease, diastolic function indeterminate.  No WMA.  -Cardiology was following the patient and patient earlier was on amiodarone drip which was started on 1/27 for 14-day course and patient was also started on Coreg and dose was uptitrated to PTA dose of 25 twice daily on 2/2 along with Lipitor 20 mg and Eliquis 5 mg twice daily  -Patient developed symptomatic bradycardia on 2/5/25  triggering a RRT. She was placed on dopamine drip and transferred back to the ICU.  - Bradycardia was likely due to rate controlling medications, Coreg and dilt which were held and cardiology was re consulted and cardiology recommended to continue amiodarone and dose was decreased to 200 mg daily and Coreg and diltiazem were discontinued and patient remained in normal sinus rhythm without bradycardia and cardiology signed off on 2/7  -2/10-developed atrial fibrillation with RVR and started on amiodarone drip and EP consulted  -2/11-converted to normal sinus rhythm and seen by EP and they recommended to finish the 24-hour IV load and then switch to amiodarone oral 200 twice daily  -If she develops another episode of A-fib with RVR then will benefit from pacemaker placement  -Continue with Eliquis 5 twice daily, atorvastatin 20 mg daily and Lasix 80 mg daily(hold on dialysis days)  -Of note her blood pressure has been trending upwards  and even after hd was high and will restart pta amlodipine 5 mg bid and start oral hydralazine 25 every eight hours and prn hydralazine available and uptitrate based on the BP    Encephalopathy likely infectious and metabolic-improved  Anxiety  -Had CT head 1/20, negative for acute intracranial process.  Volume loss and chronic microvascular ischemic changes noted.  Suspected some baseline cognitive impairment.    -Of note has been having jerking movements and she has been in the hospital  -On p.m. of 2/9 patient had agitation and had to receive Zyprexa  -2/10 patient was only AO x 1  -2/10 CT head did not show any acute intracranial process  -2/10 EEG--> showed moderate generalized slowing and triphasic waves which commonly seen in chronic kidney disease.   -2/11-patient mentation is pretty good and she is little sleepy but is conversing and is AO x 2 to 3 most likely her encephalopathy is a combination of infectious and metabolic  -2/12-patient is again fluctuating in mentation and is  AO x 2 to 3 and she was following commands while on the BiPAP and moved her upper and lower extremity and did new that she is in the hospital, where she lived, name of the president  -Appreciate input from the neurology team and they have ordered multivitamins    -Continue Seroquel  -Continue PTA Zoloft and gabapentin.  -Delirium precautions.  -PT, OT nancy.  -Social work consult for discharge planning.      Diabetes mellitus, type 2  -HbA1c 6.5% on 1/17/25.  -PTA is on Lantus 18 units daily and NovoLog 5 units 3 times daily.  -Continue with Lantus 15 units twice daily and every 4 hours sliding scale insulin  -Blood sugars fairly well controlled.  Monitor as diet is advanced and tube feedings are discontinued, will likely need to adjust insulin regimen.  -Hypoglycemia protocol    ESRD on HD , wasTue-Thu-Sat, now MWF  Anemia of chronic disease  Hyperkalemia-resolved  -Nephrology following-managing dialysis needs.  -Daily renal panel.  -Hemoglobin 7-8, at baseline. And is 8.9  -Gets EPO with hemodialysis.  -Continue  vitamin D3.  And will hold her Renvela while npo as it can't be crushed  -Nephrology resumed Lasix on nondialysis days on 2/3.    -Lokelma discontinued on 2/10 as per discussion with nephrology  -Continue with dialysis as per the nephrology team    Constipation  -Noted on CT 2/2  -Continue the bowel regimen      Dysphagia  Suspect due to recurrent intubation.  -Dietitian consulted.  -Continue tube feedings for now.  -Did well with video swallow study on 1/31/25, diet advanced.  But again was made n.p.o. on 2/11  -continue cycling tube feeds.     Likely earwax  -Patient did mention on 2/11 that she was having some ear pressure and some ringing which is much better on 2/12 and will order Debrox drops    Family communication  -I did  discuss plan of care in detail with patient's daughter Caroline [phone] at 408 pm and she had questions which were answered          Diet: Room Service  Adult Formula Drip  "Feeding: Continuous [employer] Standard 1.4; Nasoduodenal tube; Goal Rate: 55; mL/hr; From: 4:00 PM; To: 6:00 AM; OK to restart nocturnal cycle at goal rate of 55 ml/hr  NPO for Medical/Clinical Reasons Except for: Ice Chips, NPO but receiving Tube Feeding    DVT Prophylaxis: DOAC  Bradshaw Catheter: Not present  Lines: None     Cardiac Monitoring: None  Code Status: Full Code      Clinically Significant Risk Factors          # Hypochloremia: Lowest Cl = 93 mmol/L in last 2 days, will monitor as appropriate      # Hypoalbuminemia: Lowest albumin = 2.8 g/dL at 1/13/2025  5:14 AM, will monitor as appropriate     # Hypertension: Noted on problem list    # Chronic heart failure with preserved ejection fraction: heart failure noted on problem list and last echo with EF >50%    # Acute Hypercapnic Respiratory Failure: based on venous blood gas results.  Continue supplemental oxygen and ventilatory support as indicated.        # DMII: A1C = 6.5 % (Ref range: <5.7 %) within past 6 months   # Obesity: Estimated body mass index is 33.59 kg/m  as calculated from the following:    Height as of this encounter: 1.702 m (5' 7.01\").    Weight as of this encounter: 97.3 kg (214 lb 8.1 oz).        # Financial/Environmental Concerns: none         Social Drivers of Health    Tobacco Use: Medium Risk (9/11/2024)    Patient History     Smoking Tobacco Use: Former     Smokeless Tobacco Use: Never   Physical Activity: Insufficiently Active (7/19/2024)    Exercise Vital Sign     Days of Exercise per Week: 1 day     Minutes of Exercise per Session: 10 min   Social Connections: Unknown (7/19/2024)    Social Connection and Isolation Panel [NHANES]     Frequency of Social Gatherings with Friends and Family: More than three times a week          Disposition Plan     Medically Ready for Discharge: Anticipated in 2-4 Days, improvement in mental status, see stability of the heart rate, improvement in respiratory function             Johanne Crowell " MD  Hospitalist Service  [facility]  Securely message with [employer] (more info)  Text page via [employer] Paging/Directory     This note was completed in part using dictation via the Dragon voice recognition software. Some word and grammatical errors may occur and must be interpreted in the appropriate clinical context. If there are any questions pertaining to this issue, please contact me for further clarification.      Patient is critically ill and prognosis is guarded    ______________________________________________________________________    Interval History     -Reviewed events and patient yesterday did complain of ringing in the ear but nothing today  -Was on 1 to 2 L of oxygen with intermittent BiPAP and has been tolerating tube feeds  -AO x 2 and she was following all the commands and moved her both upper and lower extremities, gave me thumbs up, know the name of the president of [place] and did new where she lives  -Did mention to me that pressure and ringing in the ear on the right is much better and did mention that she does have wax issues  -No evidence of any fever    Physical Exam   Vital Signs: Temp: 97.8  F (36.6  C) Temp src: Axillary BP: (!) 156/54 Pulse: 66   Resp: 20 SpO2: 99 % O2 Device: BiPAP/CPAP Oxygen Delivery: 2 LPM  Weight: 214 lbs 8.12 oz        General: AO x 2-3, NG tube  Respiratory: Little coarse lung sounds and she is on nasal cannula oxygen  Cardiovascular: Regular rate , S1 and S2 normal   Skin: Rash around the groin fold  Abdomen:   soft , non tender , non distended and bowel sound present   Neurologic:No facial droop, follows some of the commands  Musculoskeletal: Normal Range of motion over upper and lower extremities bilaterally   Psychiatric: cooperative      Medical Decision Making       Spent in care of patient is 45 minutes and I reviewed labs including CBC and basic metabolic panel,, reviewed notes from nephrology and discussed plan of care in  detail with the patient and the nursing staff,      Data     I have personally reviewed the following data over the past 24 hrs:    5.2  \   8.9 (L)   / 173     137 93 (L) 95.9 (H) /  183 (H)   4.8 30 (H) 4.28 (H) \       Imaging results reviewed over the past 24 hrs:   No results found for this or any previous visit (from the past 24 hours).

## 2025-02-12 NOTE — PROGRESS NOTES
CLINICAL NUTRITION SERVICES - REASSESSMENT NOTE     Malnutrition Status:    % Intake: Decreased intake does not meet criteria  % Weight Loss: Weight loss does not meet criteria   Subcutaneous Fat Loss: None observed - potential for masking  Muscle Loss: Globally moderate with prolonged hospital stay   Fluid Accumulation/Edema: Trace  Malnutrition Diagnosis: Patient does not meet two of the established criteria necessary for diagnosing malnutrition  Malnutrition Present on Admission: Unable to assess    Registered Dietitian Interventions:  - Change to dialysis formula and increase provisions to provide 100% estimated needs:     Grecia Estrella Renal @ 65 mLxhr x 14 hours (4 pm - 6 am)   --> 910 mL, 1638 kcal (25 kcal/kg), 73 g protein (1.4 g/kg), 157 g CHO, 18 g fiber, 601 mL free water.   + 1 Pkt ProSource TF20 (80 kcal, 20 g protein)  TOTAL (TF + Prosource) = 1718 kcal, 93 g protein     - 30 mL q 4 hour flushes     Future/Additional Recommendations:  - Can adjust TF rate as needed if / when diet advances.      SUBJECTIVE INFORMATION  Assessed patient in room. She was asleep.    CURRENT NUTRITION ORDERS  Diet: NPO - diet downgraded by SLP yesterday.    Nutrition Support Enteral:  Type of Feeding Tube: Nasoduodenal   Enteral Frequency:  Cyclic over 14 hours per night   Enteral Regimen: Grecia Estrella 1.4 at 55 mL/hr x 14 hours + 2 pkts ProSource TF20 per day   Total Enteral Provisions: 1238 kcal (19 kcal/kg and ~3/4 energy needs), 88 g protein (1.3 g/kg), 121 g CHO, 12 g fiber, 547 mL H2O  Free Water Flush: 30 mL every 4 hours (per MD)    CURRENT INTAKE/TOLERANCE  - Pt had been eating poorly when allowed a pureed diet - flowsheets document no appetite and refusal to order meals at times.      NEW FINDINGS  Weight: 97.3 kg - consistent with weight measured 1/8.   Wt Readings from Last 5 Encounters:   02/09/25 97.3 kg (214 lb 8.1 oz)   06/14/24 101.1 kg (222 lb 14.2 oz)   06/03/24 101.1 kg (222 lb 14.2 oz)   04/24/24 101.1 kg  (222 lb 14.2 oz)   02/05/24 95.8 kg (211 lb 3.2 oz)     Skin/wounds: Right foot wound. No active PI per charting.   GI symptoms: Stooling -   2/11 - BM x3  2/10 - BM x4  2/9 - BM x1  Nutrition-relevant labs:  BUN 95.9 (H), Cr 4.28 (H), GFR 10 (L). Mg 2.7 (H), Phos 4.8 (H)  --> HD today   Nutrition-relevant medications:  Low sliding scale insulin + Lantus 15 units BID, Thiamine, VitD3    ASSESSED NUTRITION NEEDS (DW = 65.5 kg (adjusted))  Estimated Energy Needs: 0729-5312 kcals/day (25-30 kcals/kg)  Justification: Obese  Estimated Protein Needs:  grams protein/day (1.2-1.5 grams of pro/kg)  Justification: Hypercatabolism with acute illness, HD  Estimated Fluid Needs: 9965-4638 mL/day (1 mL/kcal)  Justification:  or per MD     MALNUTRITION  % Intake: Decreased intake does not meet criteria  % Weight Loss: Weight loss does not meet criteria   Subcutaneous Fat Loss: None observed - potential for masking  Muscle Loss: Globally moderate with prolonged hospital stay   Fluid Accumulation/Edema:  Trace  Malnutrition Diagnosis: Patient does not meet two of the established criteria necessary for diagnosing malnutrition  Malnutrition Present on Admission: Unable to assess    EVALUATION OF THE PROGRESS TOWARD GOALS   Previous Goals  Patient will transition from TF to PO in the next 4 days   Evaluation: Not progressing    Previous Nutrition Diagnosis  Inadequate oral intake related to dysphagia diet, respiratory status, somnolence, TF reliance as evidenced by taking minimal PO over the last several days, continued need for TF   Evaluation: Declining    NUTRITION DIAGNOSIS  Inadequate energy intake related to decreased swallowing ability as evidenced by now NPO, TF only meeting ~75% estimated energy needs.     INTERVENTIONS  See nutrition interventions above    Goals  TF to provide % estimated needs while NPO.      Monitoring/Evaluation      Progress toward goals will be monitored and evaluated per policy.    Ladonna  LISANDRO Wallace, LD  Pager: [phone]  Available on [employer]

## 2025-02-13 ENCOUNTER — APPOINTMENT (OUTPATIENT)
Dept: PHYSICAL THERAPY | Facility: CLINIC | Age: 75
DRG: 207 | End: 2025-02-13
Attending: HOSPITALIST
Payer: COMMERCIAL

## 2025-02-13 ENCOUNTER — APPOINTMENT (OUTPATIENT)
Dept: SPEECH THERAPY | Facility: CLINIC | Age: 75
DRG: 207 | End: 2025-02-13
Attending: HOSPITALIST
Payer: COMMERCIAL

## 2025-02-13 VITALS
WEIGHT: 214.51 LBS | SYSTOLIC BLOOD PRESSURE: 176 MMHG | BODY MASS INDEX: 33.67 KG/M2 | TEMPERATURE: 99 F | RESPIRATION RATE: 20 BRPM | HEART RATE: 85 BPM | DIASTOLIC BLOOD PRESSURE: 60 MMHG | OXYGEN SATURATION: 98 % | HEIGHT: 67 IN

## 2025-02-13 LAB
ANION GAP SERPL CALCULATED.3IONS-SCNC: 12 MMOL/L (ref 7–15)
BACTERIA BRONCH: NORMAL
BACTERIA BRONCH: NORMAL
BUN SERPL-MCNC: 52 MG/DL (ref 8–23)
CALCIUM SERPL-MCNC: 9.9 MG/DL (ref 8.8–10.4)
CHLORIDE SERPL-SCNC: 97 MMOL/L (ref 98–107)
CREAT SERPL-MCNC: 2.98 MG/DL (ref 0.51–0.95)
EGFRCR SERPLBLD CKD-EPI 2021: 16 ML/MIN/1.73M2
ERYTHROCYTE [DISTWIDTH] IN BLOOD BY AUTOMATED COUNT: 16.2 % (ref 10–15)
GLUCOSE BLDC GLUCOMTR-MCNC: 104 MG/DL (ref 70–99)
GLUCOSE BLDC GLUCOMTR-MCNC: 165 MG/DL (ref 70–99)
GLUCOSE BLDC GLUCOMTR-MCNC: 176 MG/DL (ref 70–99)
GLUCOSE BLDC GLUCOMTR-MCNC: 178 MG/DL (ref 70–99)
GLUCOSE BLDC GLUCOMTR-MCNC: 201 MG/DL (ref 70–99)
GLUCOSE SERPL-MCNC: 232 MG/DL (ref 70–99)
HCO3 SERPL-SCNC: 30 MMOL/L (ref 22–29)
HCT VFR BLD AUTO: 26.6 % (ref 35–47)
HGB BLD-MCNC: 8.3 G/DL (ref 11.7–15.7)
MAGNESIUM SERPL-MCNC: 2.3 MG/DL (ref 1.7–2.3)
MCH RBC QN AUTO: 30.5 PG (ref 26.5–33)
MCHC RBC AUTO-ENTMCNC: 31.2 G/DL (ref 31.5–36.5)
MCV RBC AUTO: 98 FL (ref 78–100)
PLATELET # BLD AUTO: 170 10E3/UL (ref 150–450)
POTASSIUM SERPL-SCNC: 3.5 MMOL/L (ref 3.4–5.3)
RBC # BLD AUTO: 2.72 10E6/UL (ref 3.8–5.2)
SODIUM SERPL-SCNC: 139 MMOL/L (ref 135–145)
WBC # BLD AUTO: 4.3 10E3/UL (ref 4–11)

## 2025-02-13 PROCEDURE — 250N000013 HC RX MED GY IP 250 OP 250 PS 637: Performed by: PHYSICIAN ASSISTANT

## 2025-02-13 PROCEDURE — 97110 THERAPEUTIC EXERCISES: CPT | Mod: GP

## 2025-02-13 PROCEDURE — 250N000013 HC RX MED GY IP 250 OP 250 PS 637: Performed by: HOSPITALIST

## 2025-02-13 PROCEDURE — 94660 CPAP INITIATION&MGMT: CPT

## 2025-02-13 PROCEDURE — 250N000009 HC RX 250: Performed by: HOSPITALIST

## 2025-02-13 PROCEDURE — 94640 AIRWAY INHALATION TREATMENT: CPT

## 2025-02-13 PROCEDURE — 85018 HEMOGLOBIN: CPT | Performed by: INTERNAL MEDICINE

## 2025-02-13 PROCEDURE — 83735 ASSAY OF MAGNESIUM: CPT | Performed by: HOSPITALIST

## 2025-02-13 PROCEDURE — 92526 ORAL FUNCTION THERAPY: CPT | Mod: GN

## 2025-02-13 PROCEDURE — 250N000013 HC RX MED GY IP 250 OP 250 PS 637: Performed by: INTERNAL MEDICINE

## 2025-02-13 PROCEDURE — 94640 AIRWAY INHALATION TREATMENT: CPT | Mod: 76

## 2025-02-13 PROCEDURE — 250N000011 HC RX IP 250 OP 636: Performed by: PSYCHIATRY & NEUROLOGY

## 2025-02-13 PROCEDURE — 272N000272 HC CONTINUOUS NEBULIZER MICRO PUMP

## 2025-02-13 PROCEDURE — 999N000157 HC STATISTIC RCP TIME EA 10 MIN

## 2025-02-13 PROCEDURE — 82435 ASSAY OF BLOOD CHLORIDE: CPT | Performed by: HOSPITALIST

## 2025-02-13 PROCEDURE — 36415 COLL VENOUS BLD VENIPUNCTURE: CPT | Performed by: HOSPITALIST

## 2025-02-13 PROCEDURE — 80048 BASIC METABOLIC PNL TOTAL CA: CPT | Performed by: HOSPITALIST

## 2025-02-13 PROCEDURE — 99233 SBSQ HOSP IP/OBS HIGH 50: CPT | Performed by: INTERNAL MEDICINE

## 2025-02-13 PROCEDURE — 120N000013 HC R&B IMCU

## 2025-02-13 RX ORDER — NYSTATIN 100000 [USP'U]/ML
500000 SUSPENSION ORAL 4 TIMES DAILY
Status: DISCONTINUED | OUTPATIENT
Start: 2025-02-13 | End: 2025-02-16

## 2025-02-13 RX ORDER — HYDRALAZINE HYDROCHLORIDE 50 MG/1
50 TABLET, FILM COATED ORAL EVERY 8 HOURS SCHEDULED
Status: DISCONTINUED | OUTPATIENT
Start: 2025-02-13 | End: 2025-02-16

## 2025-02-13 RX ADMIN — AMLODIPINE BESYLATE 5 MG: 5 TABLET ORAL at 18:17

## 2025-02-13 RX ADMIN — LEVALBUTEROL HYDROCHLORIDE 1.25 MG: 1.25 SOLUTION RESPIRATORY (INHALATION) at 15:26

## 2025-02-13 RX ADMIN — INSULIN ASPART 1 UNITS: 100 INJECTION, SOLUTION INTRAVENOUS; SUBCUTANEOUS at 20:18

## 2025-02-13 RX ADMIN — INSULIN GLARGINE 15 UNITS: 100 INJECTION, SOLUTION SUBCUTANEOUS at 20:18

## 2025-02-13 RX ADMIN — CARBAMIDE PEROXIDE 6.5% 5 DROP: 6.5 LIQUID AURICULAR (OTIC) at 20:31

## 2025-02-13 RX ADMIN — Medication 50 MCG: at 08:40

## 2025-02-13 RX ADMIN — NYSTATIN 500000 UNITS: 100000 SUSPENSION ORAL at 18:17

## 2025-02-13 RX ADMIN — IPRATROPIUM BROMIDE 0.5 MG: 0.5 SOLUTION RESPIRATORY (INHALATION) at 15:26

## 2025-02-13 RX ADMIN — Medication 40 MG: at 06:40

## 2025-02-13 RX ADMIN — SENNOSIDES 10 ML: 8.8 LIQUID ORAL at 08:40

## 2025-02-13 RX ADMIN — AMIODARONE HYDROCHLORIDE 200 MG: 200 TABLET ORAL at 20:11

## 2025-02-13 RX ADMIN — ACETAMINOPHEN 650 MG: 325 TABLET, FILM COATED ORAL at 10:12

## 2025-02-13 RX ADMIN — LEVALBUTEROL HYDROCHLORIDE 1.25 MG: 1.25 SOLUTION RESPIRATORY (INHALATION) at 07:38

## 2025-02-13 RX ADMIN — AMIODARONE HYDROCHLORIDE 200 MG: 200 TABLET ORAL at 08:40

## 2025-02-13 RX ADMIN — APIXABAN 5 MG: 5 TABLET, FILM COATED ORAL at 20:11

## 2025-02-13 RX ADMIN — IPRATROPIUM BROMIDE 0.5 MG: 0.5 SOLUTION RESPIRATORY (INHALATION) at 07:38

## 2025-02-13 RX ADMIN — INSULIN ASPART 1 UNITS: 100 INJECTION, SOLUTION INTRAVENOUS; SUBCUTANEOUS at 12:12

## 2025-02-13 RX ADMIN — CARBAMIDE PEROXIDE 6.5% 5 DROP: 6.5 LIQUID AURICULAR (OTIC) at 10:24

## 2025-02-13 RX ADMIN — ATORVASTATIN CALCIUM 20 MG: 20 TABLET, FILM COATED ORAL at 20:11

## 2025-02-13 RX ADMIN — IPRATROPIUM BROMIDE 0.5 MG: 0.5 SOLUTION RESPIRATORY (INHALATION) at 10:38

## 2025-02-13 RX ADMIN — CETIRIZINE HYDROCHLORIDE 5 MG: 5 TABLET ORAL at 22:46

## 2025-02-13 RX ADMIN — FUROSEMIDE 80 MG: 40 TABLET ORAL at 08:40

## 2025-02-13 RX ADMIN — NYSTATIN 500000 UNITS: 100000 SUSPENSION ORAL at 22:46

## 2025-02-13 RX ADMIN — NYSTATIN 500000 UNITS: 100000 SUSPENSION ORAL at 15:17

## 2025-02-13 RX ADMIN — THIAMINE HYDROCHLORIDE 200 MG: 100 INJECTION, SOLUTION INTRAMUSCULAR; INTRAVENOUS at 08:39

## 2025-02-13 RX ADMIN — APIXABAN 5 MG: 5 TABLET, FILM COATED ORAL at 08:40

## 2025-02-13 RX ADMIN — SERTRALINE HYDROCHLORIDE 75 MG: 50 TABLET ORAL at 20:11

## 2025-02-13 RX ADMIN — BUDESONIDE 1 MG: 1 INHALANT ORAL at 19:36

## 2025-02-13 RX ADMIN — MICONAZOLE NITRATE: 2 POWDER TOPICAL at 20:31

## 2025-02-13 RX ADMIN — HYDRALAZINE HYDROCHLORIDE 25 MG: 25 TABLET ORAL at 06:40

## 2025-02-13 RX ADMIN — Medication 5 ML: at 10:33

## 2025-02-13 RX ADMIN — HYDRALAZINE HYDROCHLORIDE 50 MG: 50 TABLET ORAL at 15:16

## 2025-02-13 RX ADMIN — INSULIN GLARGINE 15 UNITS: 100 INJECTION, SOLUTION SUBCUTANEOUS at 12:12

## 2025-02-13 RX ADMIN — AMLODIPINE BESYLATE 5 MG: 5 TABLET ORAL at 08:40

## 2025-02-13 RX ADMIN — HYDRALAZINE HYDROCHLORIDE 50 MG: 50 TABLET ORAL at 22:46

## 2025-02-13 RX ADMIN — TACROLIMUS: 1 OINTMENT TOPICAL at 10:24

## 2025-02-13 RX ADMIN — IPRATROPIUM BROMIDE 0.5 MG: 0.5 SOLUTION RESPIRATORY (INHALATION) at 19:36

## 2025-02-13 RX ADMIN — BUDESONIDE 1 MG: 1 INHALANT ORAL at 07:43

## 2025-02-13 RX ADMIN — TACROLIMUS: 1 OINTMENT TOPICAL at 20:31

## 2025-02-13 RX ADMIN — POLYETHYLENE GLYCOL 3350 17 G: 17 POWDER, FOR SOLUTION ORAL at 08:40

## 2025-02-13 RX ADMIN — INSULIN ASPART 1 UNITS: 100 INJECTION, SOLUTION INTRAVENOUS; SUBCUTANEOUS at 09:00

## 2025-02-13 RX ADMIN — MICONAZOLE NITRATE: 2 POWDER TOPICAL at 10:25

## 2025-02-13 RX ADMIN — LEVALBUTEROL HYDROCHLORIDE 1.25 MG: 1.25 SOLUTION RESPIRATORY (INHALATION) at 19:36

## 2025-02-13 RX ADMIN — QUETIAPINE FUMARATE 50 MG: 50 TABLET ORAL at 22:46

## 2025-02-13 RX ADMIN — LEVALBUTEROL HYDROCHLORIDE 1.25 MG: 1.25 SOLUTION RESPIRATORY (INHALATION) at 10:38

## 2025-02-13 RX ADMIN — INSULIN ASPART 1 UNITS: 100 INJECTION, SOLUTION INTRAVENOUS; SUBCUTANEOUS at 04:47

## 2025-02-13 RX ADMIN — Medication 60 ML: at 08:40

## 2025-02-13 ASSESSMENT — ACTIVITIES OF DAILY LIVING (ADL)
ADLS_ACUITY_SCORE: 84
ADLS_ACUITY_SCORE: 85
ADLS_ACUITY_SCORE: 84
ADLS_ACUITY_SCORE: 83
ADLS_ACUITY_SCORE: 85
ADLS_ACUITY_SCORE: 85
ADLS_ACUITY_SCORE: 83
ADLS_ACUITY_SCORE: 84
ADLS_ACUITY_SCORE: 83
ADLS_ACUITY_SCORE: 83
ADLS_ACUITY_SCORE: 84
ADLS_ACUITY_SCORE: 83
ADLS_ACUITY_SCORE: 85
ADLS_ACUITY_SCORE: 85
ADLS_ACUITY_SCORE: 83
ADLS_ACUITY_SCORE: 85
ADLS_ACUITY_SCORE: 83
ADLS_ACUITY_SCORE: 83
ADLS_ACUITY_SCORE: 85
ADLS_ACUITY_SCORE: 85

## 2025-02-13 NOTE — PROGRESS NOTES
Care Management Follow Up    Length of Stay (days): 36    Expected Discharge Date: 02/17/2025     Concerns to be Addressed: other (see comments) (elevated risk)     Patient plan of care discussed at interdisciplinary rounds: Yes    Anticipated Discharge Disposition: Skilled Nursing Facility    Anticipated Discharge Services: None  Anticipated Discharge DME: None    Patient/family educated on Medicare website which has current facility and service quality ratings: no  Education Provided on the Discharge Plan: No  Patient/Family in Agreement with the Plan: yes    Referrals Placed by CM/SW:    Private pay costs discussed: Not applicable    Discussed  Partnership in Safe Discharge Planning  document with patient/family: No     Handoff Completed: Yes, [facility] PCP: Internal handoff referral completed    Additional Information:  Care management following for discharge planning to TCU. Pt preferences are [facility] (knows people who live there), [facility], [facility], [facility], [facility], [facility]. Pt goes to [facility] for Dialysis. Pt not ready for discharge at this time.     Next Steps: Care management following for medical readiness, will send TCU choices closer to discharge.     TOMER Mendes  [facility]  Inpatient Care Management

## 2025-02-13 NOTE — PROGRESS NOTES
CPAP/BiPAP Settings  IPAP: 10  EPAP: 5  Rate: 12  FiO2: 30  Timed Inspiration (sec): 0.9    CPAP/BiPAP/AVAPS/AVAPS AE Alarms  High Pressure: 25  Low Pressure: 5  Low Pressure Delay: 20  High Rate (breaths/min): 45  Low Rate (breaths/min): 5  Alarm Volume Level: 10    CPAP/BiPAP/AVAPS/AVAPS AE Patient Assessment  Skin Assessment: Intact  Lung Sounds: diminished    Plan: We will continue to wean as kamar.    Phyllis Schafer, RT  2/13/2025

## 2025-02-13 NOTE — PROGRESS NOTES
[facility]    Medicine Progress Note - Hospitalist Service    Date of Admission:  1/8/2025    Assessment & Plan   Supriya Herr is a 74 year old female with PMHx significant for ESRD on HD, CHF, COPD, poor mobility (Lt AKA, Obesity, WC bound), DM type II, hypertension.  She was brought to the ER by EMS for evaluation of shortness of breath, diagnosed with influenza A and admitted on 1/8/25       Hospital course summary  Patient was admitted, subsequently intubated for worsening respiratory failure and remained on mechanical ventilator 1/9 - 1/18.  She was reintubated and remained on mechanical ventilator 1/20 - 1/25.  Was treated with Tamiflu, IV antibiotic, steroid, has completed courses.  Hospital course complicated by A-fib RVR.  Was on amiodarone drip with eventual transition to p.o. Also required diltiazem  for rate control.  Required BiPAP--HFNC--intermittent BiPAP use.  Ongoing tube feeding and modified diet per SLP.  Delirium managed with Seroquel.  Concern for recurrent pneumonia, hospital-acquired on 2/1, restarted on antibiotics.  ID consulted, recommended discontinuing antibiotics, respiratory failure likely related to fluid overload.     Patient developed symptomatic bradycardia on 2/5/25 triggering a RRT. She was placed on dopamine drip and transferred back to the ICU. Bradycardia was likely due to rate controlling medications. Cardiology consulted with adjustment of medications. Patient has remained in NSR without recurrence of bradycardia on amio drip. While in the ICU, patient required dialysis on pressors, now transitioned to midodrine . Hypoxic and hypercarbic resp failure is much improved with dialysis.      Patient transferred back to hospitalist service on 2/8/25.      Acute hypoxic respiratory failure-improving  Influenza A pneumonia  Acute COPD exacerbation  Hospital-acquired pneumonia  Pharyngeal edema  -Initial presentation with shortness of breath, diagnosed  influenza A. Initial course as noted above.  -Had bronchoscopy and BAL 1/20, culture grew actinomyces, CT chest showed RML infiltrate versus atelectasis but no sign of actinomycosis per intensivist and felt this was likely colonization.    -Pneumonia treated with cefepime and vancomycin, cefepime changed to Levaquin and completed antibiotic course 1/26.  -Was treated with IV steroid and Tamiflu and completed the course  -2/1 given increased oxygen need during earlier course of hospital stay there was concern about infection again and blood cultures done on 2/1 have been negative and Pro-Geraldo was elevated and CT chest was concerning for right lower lobe pneumonia and ID was consulted and patient was again started on vancomycin and Levaquin  -ID was consulted and they recommended to stop antibiotics and respiratory failure was likely due to fluid overload and patient was improving with dialysis  -Continue nebs-budesonide 1 Mg twice daily, Neb 4 times daily,       - Continue to wean O2     Recurrent atrial fibrillation with RVR now in normal sinus rhythm  Acute on chronic CHFpEF  Hypertension  Shock, due to sedation-resolved  Symptomatic bradycardia-Resolved  -PTA is on amlodipine 5 Mg twice daily, Coreg 25 Mg p.o. twice daily and Lasix.  -TTE this is stay shows LVEF 60%, no pericardial effusion, no significant valvular disease, diastolic function indeterminate.  No WMA.  -Cardiology was following the patient and patient earlier was on amiodarone drip which was started on 1/27 for 14-day course and patient was also started on Coreg and dose was uptitrated to PTA dose of 25 twice daily on 2/2 along with Lipitor 20 mg and Eliquis 5 mg twice daily  -Patient developed symptomatic bradycardia on 2/5/25 triggering a RRT. She was placed on dopamine drip and transferred back to the ICU.  - Bradycardia was likely due to rate controlling medications, Coreg and dilt which were held and cardiology was re consulted and cardiology  recommended to continue amiodarone and dose was decreased to 200 mg daily and Coreg and diltiazem were discontinued and patient remained in normal sinus rhythm without bradycardia and cardiology signed off on 2/7  -2/10-developed atrial fibrillation with RVR and started on amiodarone drip and EP consulted  -2/11-converted to normal sinus rhythm and seen by EP and they recommended to finish the 24-hour IV load and then switch to amiodarone oral 200 twice daily    -Continue with Eliquis 5 twice daily, atorvastatin 20 mg daily and Lasix 80 mg daily(hold on dialysis days)  -Of note her blood pressure has been trending upwards. 2/12 restarted pta amlodipine 5 mg bid and start oral hydralazine 25 every eight hours and prn hydralazine available and uptitrate based on the BP  2/13: Increase hydralazine to 50 mg every 8     Encephalopathy likely infectious and metabolic-improved  Anxiety  -Had CT head 1/20, negative for acute intracranial process.  Volume loss and chronic microvascular ischemic changes noted.  Suspected some baseline cognitive impairment.    -Of note has been having jerking movements and she has been in the hospital  -On p.m. of 2/9 patient had agitation and had to receive Zyprexa  -2/10 patient was only AO x 1  -2/10 CT head did not show any acute intracranial process  -2/10 EEG--> showed moderate generalized slowing and triphasic waves which commonly seen in chronic kidney disease.   -2/11-patient mentation is pretty good and she is little sleepy but is conversing and is AO x 2 to 3 most likely her encephalopathy is a combination of infectious and metabolic  -2/12-patient is again fluctuating in mentation and is AO x 2 to 3 and she was following commands while on the BiPAP and moved her upper and lower extremity and did new that she is in the hospital, where she lived, name of the president  -Appreciate input from the neurology team and they have ordered multivitamins     -Continue Seroquel  -Continue PTA  Zoloft and gabapentin.  -Delirium precautions.  -PT, OT eval.  -Social work consult for discharge planning.     Diabetes mellitus, type 2  -HbA1c 6.5% on 1/17/25.  -PTA is on Lantus 18 units daily and NovoLog 5 units 3 times daily.  -Continue with Lantus 15 units twice daily and every 4 hours sliding scale insulin  -Blood sugars fairly well controlled.  Monitor as diet is advanced and tube feedings are discontinued, will likely need to adjust insulin regimen.  -Hypoglycemia protocol     ESRD on HD , wasTue-Thu-Sat, now MWF  Anemia of chronic disease  Hyperkalemia-resolved  -Nephrology following-managing dialysis needs.  -Daily renal panel.  -Hemoglobin 7-8, at baseline. And is 8.9  -Gets EPO with hemodialysis.  -Continue  vitamin D3.  And will hold her Renvela while npo as it can't be crushed  -Nephrology resumed Lasix on nondialysis days on 2/3.    -Lokelma discontinued on 2/10 as per discussion with nephrology  -Continue with dialysis as per the nephrology team     Constipation  -Noted on CT 2/2  -Continue the bowel regimen        Dysphagia  Suspect due to recurrent intubation.  -Dietitian consulted.  -Continue tube feedings for now.  -Did well with video swallow study on 1/31/25, diet advanced.  But again was made n.p.o. on 2/11  -continue cycling tube feeds.      Likely earwax  -Patient did mention on 2/11 that she was having some ear pressure and some ringing which is much better on 2/12 and will order Debrox drops         Diet: Room Service  NPO for Medical/Clinical Reasons Except for: Ice Chips, NPO but receiving Tube Feeding  Adult Formula Drip Feeding: IGAWorks Renal Support; Nasoduodenal tube; Goal Rate: 65; mL/hr; From: 4:00 PM; To: 6:00 AM; Change formula. Restart at new goal, run x14 hours nightly for total volume 910 mL    DVT Prophylaxis: DOAC  Bradshaw Catheter: Not present  Lines: None     Cardiac Monitoring: None  Code Status: Full Code      Clinically Significant Risk Factors          #  "Hypochloremia: Lowest Cl = 93 mmol/L in last 2 days, will monitor as appropriate      # Hypoalbuminemia: Lowest albumin = 2.8 g/dL at 1/13/2025  5:14 AM, will monitor as appropriate     # Hypertension: Noted on problem list  # Chronic heart failure with preserved ejection fraction: heart failure noted on problem list and last echo with EF >50%    # Acute Hypercapnic Respiratory Failure: based on venous blood gas results.  Continue supplemental oxygen and ventilatory support as indicated.        # DMII: A1C = 6.5 % (Ref range: <5.7 %) within past 6 months   # Obesity: Estimated body mass index is 33.59 kg/m  as calculated from the following:    Height as of this encounter: 1.702 m (5' 7.01\").    Weight as of this encounter: 97.3 kg (214 lb 8.1 oz).      # Financial/Environmental Concerns: none         Social Drivers of Health    Tobacco Use: Medium Risk (9/11/2024)    Patient History     Smoking Tobacco Use: Former     Smokeless Tobacco Use: Never   Physical Activity: Insufficiently Active (7/19/2024)    Exercise Vital Sign     Days of Exercise per Week: 1 day     Minutes of Exercise per Session: 10 min   Social Connections: Unknown (7/19/2024)    Social Connection and Isolation Panel [NHANES]     Frequency of Social Gatherings with Friends and Family: More than three times a week          Disposition Plan     Medically Ready for Discharge: Anticipated Tomorrow             Greg Bebee MD  Hospitalist Service  [facility]  Securely message with [employer] (more info)  Text page via SMART Paging/Directory   \"This dictation was performed with voice recognition software and may contain errors,  omissions and inadvertent word substitution.\"    ______________________________________________________________________    Interval History   Feeling okay except for throat irritation.  Denies any chest pain/palpitations    Physical Exam   BP (!) 156/60   Pulse 75   Temp 98.6  F (37  C) (Oral)   Resp (!) " "33   Ht 1.702 m (5' 7.01\")   Wt 97.3 kg (214 lb 8.1 oz)   LMP  (LMP Unknown)   SpO2 100%   BMI 33.59 kg/m    Gen- pleasant   Neck- supple  CVS- I+II+ no m/r/g  RS- CTAB  Abdo- soft, no tenderness . No g/r/r  Ext- no edema     Medical Decision Making       51 MINUTES SPENT BY ME on the date of service doing chart review, history, exam, documentation & further activities per the note.      Data   ------------------------- PAST 24 HR DATA REVIEWED -----------------------------------------------    I have personally reviewed the following data over the past 24 hrs:    4.3  \   8.3 (L)   / 170     139 97 (L) 52.0 (H) /  178 (H)   3.5 30 (H) 2.98 (H) \       Imaging results reviewed over the past 24 hrs:   No results found for this or any previous visit (from the past 24 hours).  "

## 2025-02-13 NOTE — PLAN OF CARE
Shift Summary [id number]:    HTN (received PRN hydralazine x1) otherwise VSS on 1-2L O2 NC. Orientation varied throughout the shift. Consistently oriented to self. Pain managed w/ meds (see MAR). LS dim, DEVRIES. PO suction PRN. Tele: , faith DENISE. Anuric. LUE fistula WDL. No BM this shift, +BS and passing flatus. TF running at 65ml/hr (goal rate). 30ml FWF q4h. B,122,102. VBGs obtained (see results). NPO diet, pt declined ice chips but does report wanting to eat food. Up A2 lift. Repo q2h. Consulted teams: Neph. Discharge tbd. Care plan updated.     See flowsheet for full assessment. See MAR for meds given.    Yahir Oneill, RN   6:26 PM    Goal Outcome Evaluation:      Plan of Care Reviewed With: patient, family    Overall Patient Progress: no change    Outcome Evaluation: IMC status continued. HD today (see note). Started PO amlodipine for HTN.

## 2025-02-13 NOTE — PLAN OF CARE
1900-0730    Orientations: AxOx2-3, fluctuates. Consistently oriented to self, consistently disoriented to time. Occasionally restless/anxious but mostly redirectable.  Vitals/Pain: VSS on 1-2LNC / BIPAP 30% overnight.  Tele: NSR  Lines/Drains: PIV x1 SL. LUE fistula, thrill + bruit present.  Skin/Wounds: Blanchable redness/discoloration to buttocks. L AKA. Scattered bruises.  GI/: NPO except single ice chips with RN supervision; NOC TF running at 65ml/hr with 30ml flush q4h. Anuric, on HD. No BM, scheduled Senna given.  Labs: BG q4h: 176, 194, 201.  Ambulation/Assist: Assist x2 with lift; turn and repo q2h.  Sleep Quality: Sleeping between cares.    Plan: Wean O2 as able. Encourage activity/OOB. SLP to reassess today since still NPO.

## 2025-02-13 NOTE — PROVIDER NOTIFICATION
"Paged on-call hospitalist Alexander:    \"Patient received dose of PO Amiodarone late today due to dialysis (given at 1700). Next dose currently scheduled for 2100. Please advise.\"    Response:  OK to give as scheduled    "

## 2025-02-14 ENCOUNTER — APPOINTMENT (OUTPATIENT)
Dept: SPEECH THERAPY | Facility: CLINIC | Age: 75
DRG: 207 | End: 2025-02-14
Attending: HOSPITALIST
Payer: COMMERCIAL

## 2025-02-14 LAB
ANION GAP SERPL CALCULATED.3IONS-SCNC: 14 MMOL/L (ref 7–15)
APTT PPP: 35 SECONDS (ref 22–38)
BUN SERPL-MCNC: 84.9 MG/DL (ref 8–23)
CALCIUM SERPL-MCNC: 10 MG/DL (ref 8.8–10.4)
CHLORIDE SERPL-SCNC: 94 MMOL/L (ref 98–107)
CREAT SERPL-MCNC: 4.22 MG/DL (ref 0.51–0.95)
EGFRCR SERPLBLD CKD-EPI 2021: 10 ML/MIN/1.73M2
ERYTHROCYTE [DISTWIDTH] IN BLOOD BY AUTOMATED COUNT: 15.9 % (ref 10–15)
ERYTHROCYTE [DISTWIDTH] IN BLOOD BY AUTOMATED COUNT: 16.1 % (ref 10–15)
GLUCOSE BLDC GLUCOMTR-MCNC: 112 MG/DL (ref 70–99)
GLUCOSE BLDC GLUCOMTR-MCNC: 117 MG/DL (ref 70–99)
GLUCOSE BLDC GLUCOMTR-MCNC: 162 MG/DL (ref 70–99)
GLUCOSE BLDC GLUCOMTR-MCNC: 180 MG/DL (ref 70–99)
GLUCOSE BLDC GLUCOMTR-MCNC: 229 MG/DL (ref 70–99)
GLUCOSE BLDC GLUCOMTR-MCNC: 244 MG/DL (ref 70–99)
GLUCOSE SERPL-MCNC: 251 MG/DL (ref 70–99)
HCO3 SERPL-SCNC: 29 MMOL/L (ref 22–29)
HCT VFR BLD AUTO: 25.6 % (ref 35–47)
HCT VFR BLD AUTO: 27.2 % (ref 35–47)
HGB BLD-MCNC: 8.1 G/DL (ref 11.7–15.7)
HGB BLD-MCNC: 8.4 G/DL (ref 11.7–15.7)
INR PPP: 1.57 (ref 0.85–1.15)
LACTATE SERPL-SCNC: 0.7 MMOL/L (ref 0.7–2)
MAGNESIUM SERPL-MCNC: 2.5 MG/DL (ref 1.7–2.3)
MCH RBC QN AUTO: 30.1 PG (ref 26.5–33)
MCH RBC QN AUTO: 30.8 PG (ref 26.5–33)
MCHC RBC AUTO-ENTMCNC: 30.9 G/DL (ref 31.5–36.5)
MCHC RBC AUTO-ENTMCNC: 31.6 G/DL (ref 31.5–36.5)
MCV RBC AUTO: 97 FL (ref 78–100)
MCV RBC AUTO: 98 FL (ref 78–100)
PLATELET # BLD AUTO: 161 10E3/UL (ref 150–450)
PLATELET # BLD AUTO: 184 10E3/UL (ref 150–450)
POTASSIUM SERPL-SCNC: 3.7 MMOL/L (ref 3.4–5.3)
RBC # BLD AUTO: 2.63 10E6/UL (ref 3.8–5.2)
RBC # BLD AUTO: 2.79 10E6/UL (ref 3.8–5.2)
SODIUM SERPL-SCNC: 137 MMOL/L (ref 135–145)
WBC # BLD AUTO: 4.2 10E3/UL (ref 4–11)
WBC # BLD AUTO: 4.4 10E3/UL (ref 4–11)

## 2025-02-14 PROCEDURE — 94660 CPAP INITIATION&MGMT: CPT

## 2025-02-14 PROCEDURE — 83605 ASSAY OF LACTIC ACID: CPT | Performed by: HOSPITALIST

## 2025-02-14 PROCEDURE — 250N000013 HC RX MED GY IP 250 OP 250 PS 637: Performed by: HOSPITALIST

## 2025-02-14 PROCEDURE — 80048 BASIC METABOLIC PNL TOTAL CA: CPT | Performed by: HOSPITALIST

## 2025-02-14 PROCEDURE — 36415 COLL VENOUS BLD VENIPUNCTURE: CPT | Performed by: INTERNAL MEDICINE

## 2025-02-14 PROCEDURE — 94640 AIRWAY INHALATION TREATMENT: CPT

## 2025-02-14 PROCEDURE — 999N000157 HC STATISTIC RCP TIME EA 10 MIN

## 2025-02-14 PROCEDURE — 85610 PROTHROMBIN TIME: CPT | Performed by: PHYSICIAN ASSISTANT

## 2025-02-14 PROCEDURE — 250N000009 HC RX 250: Performed by: HOSPITALIST

## 2025-02-14 PROCEDURE — 99233 SBSQ HOSP IP/OBS HIGH 50: CPT | Performed by: INTERNAL MEDICINE

## 2025-02-14 PROCEDURE — 85018 HEMOGLOBIN: CPT | Performed by: INTERNAL MEDICINE

## 2025-02-14 PROCEDURE — 250N000013 HC RX MED GY IP 250 OP 250 PS 637: Performed by: INTERNAL MEDICINE

## 2025-02-14 PROCEDURE — 85014 HEMATOCRIT: CPT | Performed by: INTERNAL MEDICINE

## 2025-02-14 PROCEDURE — 250N000011 HC RX IP 250 OP 636: Performed by: PSYCHIATRY & NEUROLOGY

## 2025-02-14 PROCEDURE — 120N000001 HC R&B MED SURG/OB

## 2025-02-14 PROCEDURE — 90935 HEMODIALYSIS ONE EVALUATION: CPT | Performed by: INTERNAL MEDICINE

## 2025-02-14 PROCEDURE — 92526 ORAL FUNCTION THERAPY: CPT | Mod: GN | Performed by: SPEECH-LANGUAGE PATHOLOGIST

## 2025-02-14 PROCEDURE — 94640 AIRWAY INHALATION TREATMENT: CPT | Mod: 76

## 2025-02-14 PROCEDURE — 85730 THROMBOPLASTIN TIME PARTIAL: CPT | Performed by: INTERNAL MEDICINE

## 2025-02-14 PROCEDURE — 83735 ASSAY OF MAGNESIUM: CPT | Performed by: HOSPITALIST

## 2025-02-14 PROCEDURE — 250N000013 HC RX MED GY IP 250 OP 250 PS 637: Performed by: PHYSICIAN ASSISTANT

## 2025-02-14 PROCEDURE — 634N000001 HC RX 634: Mod: JZ | Performed by: INTERNAL MEDICINE

## 2025-02-14 PROCEDURE — 90937 HEMODIALYSIS REPEATED EVAL: CPT

## 2025-02-14 RX ORDER — HEPARIN SODIUM 10000 [USP'U]/100ML
0-5000 INJECTION, SOLUTION INTRAVENOUS CONTINUOUS
Status: DISCONTINUED | OUTPATIENT
Start: 2025-02-14 | End: 2025-02-17

## 2025-02-14 RX ADMIN — INSULIN ASPART 2 UNITS: 100 INJECTION, SOLUTION INTRAVENOUS; SUBCUTANEOUS at 08:22

## 2025-02-14 RX ADMIN — EPOETIN ALFA-EPBX 10000 UNITS: 10000 INJECTION, SOLUTION INTRAVENOUS; SUBCUTANEOUS at 12:12

## 2025-02-14 RX ADMIN — MICONAZOLE NITRATE: 2 POWDER TOPICAL at 08:10

## 2025-02-14 RX ADMIN — Medication 40 MG: at 06:11

## 2025-02-14 RX ADMIN — IPRATROPIUM BROMIDE 0.5 MG: 0.5 SOLUTION RESPIRATORY (INHALATION) at 19:11

## 2025-02-14 RX ADMIN — HYDRALAZINE HYDROCHLORIDE 50 MG: 50 TABLET ORAL at 06:11

## 2025-02-14 RX ADMIN — AMIODARONE HYDROCHLORIDE 200 MG: 200 TABLET ORAL at 20:44

## 2025-02-14 RX ADMIN — CARBAMIDE PEROXIDE 6.5% 5 DROP: 6.5 LIQUID AURICULAR (OTIC) at 08:10

## 2025-02-14 RX ADMIN — QUETIAPINE FUMARATE 50 MG: 50 TABLET ORAL at 20:44

## 2025-02-14 RX ADMIN — INSULIN GLARGINE 15 UNITS: 100 INJECTION, SOLUTION SUBCUTANEOUS at 20:56

## 2025-02-14 RX ADMIN — INSULIN ASPART 1 UNITS: 100 INJECTION, SOLUTION INTRAVENOUS; SUBCUTANEOUS at 20:56

## 2025-02-14 RX ADMIN — HYDRALAZINE HYDROCHLORIDE 50 MG: 50 TABLET ORAL at 20:45

## 2025-02-14 RX ADMIN — MICONAZOLE NITRATE: 2 POWDER TOPICAL at 20:55

## 2025-02-14 RX ADMIN — SERTRALINE HYDROCHLORIDE 75 MG: 50 TABLET ORAL at 20:44

## 2025-02-14 RX ADMIN — IPRATROPIUM BROMIDE 0.5 MG: 0.5 SOLUTION RESPIRATORY (INHALATION) at 15:15

## 2025-02-14 RX ADMIN — TACROLIMUS: 1 OINTMENT TOPICAL at 20:56

## 2025-02-14 RX ADMIN — TACROLIMUS: 1 OINTMENT TOPICAL at 08:11

## 2025-02-14 RX ADMIN — LEVALBUTEROL HYDROCHLORIDE 1.25 MG: 1.25 SOLUTION RESPIRATORY (INHALATION) at 07:15

## 2025-02-14 RX ADMIN — SENNOSIDES 10 ML: 8.8 LIQUID ORAL at 20:46

## 2025-02-14 RX ADMIN — Medication 60 ML: at 13:04

## 2025-02-14 RX ADMIN — BUDESONIDE 1 MG: 1 INHALANT ORAL at 07:16

## 2025-02-14 RX ADMIN — ATORVASTATIN CALCIUM 20 MG: 20 TABLET, FILM COATED ORAL at 20:44

## 2025-02-14 RX ADMIN — AMLODIPINE BESYLATE 5 MG: 5 TABLET ORAL at 17:58

## 2025-02-14 RX ADMIN — LIDOCAINE HYDROCHLORIDE 1 ML: 10 INJECTION, SOLUTION EPIDURAL; INFILTRATION; INTRACAUDAL; PERINEURAL at 12:26

## 2025-02-14 RX ADMIN — THIAMINE HYDROCHLORIDE 200 MG: 100 INJECTION, SOLUTION INTRAMUSCULAR; INTRAVENOUS at 08:10

## 2025-02-14 RX ADMIN — APIXABAN 5 MG: 5 TABLET, FILM COATED ORAL at 08:09

## 2025-02-14 RX ADMIN — Medication 5 ML: at 13:09

## 2025-02-14 RX ADMIN — LEVALBUTEROL HYDROCHLORIDE 1.25 MG: 1.25 SOLUTION RESPIRATORY (INHALATION) at 15:15

## 2025-02-14 RX ADMIN — AMLODIPINE BESYLATE 5 MG: 5 TABLET ORAL at 13:04

## 2025-02-14 RX ADMIN — INSULIN ASPART 1 UNITS: 100 INJECTION, SOLUTION INTRAVENOUS; SUBCUTANEOUS at 04:55

## 2025-02-14 RX ADMIN — INSULIN GLARGINE 15 UNITS: 100 INJECTION, SOLUTION SUBCUTANEOUS at 08:10

## 2025-02-14 RX ADMIN — AMIODARONE HYDROCHLORIDE 200 MG: 200 TABLET ORAL at 08:09

## 2025-02-14 RX ADMIN — CETIRIZINE HYDROCHLORIDE 5 MG: 5 TABLET ORAL at 20:44

## 2025-02-14 RX ADMIN — Medication 50 MCG: at 08:09

## 2025-02-14 RX ADMIN — IPRATROPIUM BROMIDE 0.5 MG: 0.5 SOLUTION RESPIRATORY (INHALATION) at 07:15

## 2025-02-14 RX ADMIN — BUDESONIDE 1 MG: 1 INHALANT ORAL at 19:11

## 2025-02-14 RX ADMIN — INSULIN ASPART 1 UNITS: 100 INJECTION, SOLUTION INTRAVENOUS; SUBCUTANEOUS at 00:29

## 2025-02-14 RX ADMIN — HYDRALAZINE HYDROCHLORIDE 50 MG: 50 TABLET ORAL at 14:18

## 2025-02-14 RX ADMIN — LEVALBUTEROL HYDROCHLORIDE 1.25 MG: 1.25 SOLUTION RESPIRATORY (INHALATION) at 19:11

## 2025-02-14 ASSESSMENT — ACTIVITIES OF DAILY LIVING (ADL)
ADLS_ACUITY_SCORE: 83

## 2025-02-14 NOTE — PROGRESS NOTES
Care Management Follow Up    Length of Stay (days): 37    Expected Discharge Date: 02/17/2025 pending either taking PO or G tube placement     Concerns to be Addressed:TF, ability to get in and out of vehicle for dialysis  Patient plan of care discussed at interdisciplinary rounds: Yes    Anticipated Discharge Disposition: Skilled Nursing Facility        Anticipated Discharge Services: rehab, dialysis  Anticipated Discharge DME: None    Patient/family educated on Medicare website which has current facility and service quality ratings: yes  Education Provided on the Discharge Plan: ongoing  Patient/Family in Agreement with the Plan: yes    Referrals Placed by CM/SW:  [facility]  Private pay costs discussed: Not applicable    Discussed  Partnership in Safe Discharge Planning  document with patient/family: No     Handoff Completed: No, handoff not indicated or clinically appropriate, will need to be sent on discharge    Additional Information:  Rounding hospitalist noted anticipated discharge today in his note yesterday.  Unfortunately, patient will not be able to go to any TCU with NG feedings.  CC asked [place] to assess for admission, as it is felt with current medical issues along with being a dialysis patient, this would be of benefit.  Received word back from [facility] Clinical Liaison.  Patient is not meeting clinical criteria for [facility].  Patient will either be needing to be taking PO or alternative feeding source other than NG, to be accepted by any TCU.  Patient dialyzes at the [facility] unit and recently changed from TTS to MWF.    Next Steps:   Care management will continue to follow for safe discharge planning.    Briana Alcocer, RN  Inpatient Care Management  [phone]

## 2025-02-14 NOTE — CONSULTS
"  Interventional Radiology - Pre-Procedure Note:  2/14/2025    Procedure Requested: Gastrostomy tube placement  Requested by: Dr Beebe    Brief HPI: Supriya Herr is a 75 year old female w h/o ESRD on HD, CHF, COPD, Lt AKA, T2DM, HTN who presented w SOB. Diagnosed w Influenza and admitted, subsequently intubated due to resp failure. On Eliquis for Afib. Receiving TF for supplemental nutrition via NG tube. Supplemental nutrition requirements expected to be ongoing and IR consulted for G Tube placement.      IMAGING:  CT C/A/P 2/2/25:  \"IMPRESSION:  1.  New right upper lobe airspace disease compatible with pneumonia. Increasing bilateral pleural effusions.  2.  Large fecal burden in the rectosigmoid.  3.  Nonspecific mediastinal and retroperitoneal adenopathy.  4.  Advanced degenerative changes in the right hip.\"    NPO: Ordered after MN Sunday for procedure Monday  ANTICOAGULANTS: Eliquis  ANTIBIOTICS: Ancef 2g IV for periprocedural prophylaxis      ALLERGIES  Allergies   Allergen Reactions    Ampicillin-Sulbactam Sodium Rash     No evidence SJS, but very uncomfortable and precipitated multiple provider visits. Would not use penicillins again if other options available.     Penicillins Rash         LABS:  INR   Date Value Ref Range Status   04/24/2024 1.11 0.85 - 1.15 Final   04/16/2021 1.14 0.86 - 1.14 Final      Hemoglobin   Date Value Ref Range Status   02/14/2025 8.1 (L) 11.7 - 15.7 g/dL Final   04/16/2021 10.9 (L) 11.7 - 15.7 g/dL Final   ]  Platelet Count   Date Value Ref Range Status   02/14/2025 161 150 - 450 10e3/uL Final   04/16/2021 194 150 - 450 10e9/L Final     Creatinine   Date Value Ref Range Status   02/14/2025 4.22 (H) 0.51 - 0.95 mg/dL Final   04/17/2021 5.98 (H) 0.52 - 1.04 mg/dL Final     Potassium   Date Value Ref Range Status   02/14/2025 3.7 3.4 - 5.3 mmol/L Final   02/02/2022 4.7 3.4 - 5.3 mmol/L Final   04/17/2021 4.2 3.4 - 5.3 mmol/L Final         EXAM:  BP (!) 155/53   Pulse 86   Temp " "97.9  F (36.6  C) (Oral)   Resp 20   Ht 1.702 m (5' 7.01\")   Wt 97.3 kg (214 lb 8.1 oz)   LMP  (LMP Unknown)   SpO2 93%   BMI 33.59 kg/m    General:  Stable.  In no acute distress.    Neuro:  A&O x 3. Moves all extremities equally.  Heart: RRR  Lungs: No increased work of breathing on supplemental O2 via NC, CTA      Pre-Sedation Code Status Assessment:  Code Status: Full Code intra procedure, per discussion with patient      ASSESSMENT/PLAN:   Acute respiratory failure  Dysphagia  Recurrent Afib  Dysphagia    -Gastrostomy tube placement with image guidance, moderate sedation Monday  -Hold Eliquis now. Discussed w Dr Beebe who will start heparin drip  -Order placed to hold heparin 4-6 hours prior to procedure, coordinate timing w IR charge RN [id number]  -OK to resume heparin 6-8 hours after procedure if no complications. OK to resume Eliquis day after procedure.  -NPO after MN Sunday for procedure Monday    Procedure, risks/benefits, details, alternatives, and sedation reviewed with patient and patient verbalized understanding. All questions answered. OK to proceed with above radiology procedure.     Hugo Hector PA-C  Interventional Radiology  *[id number]  [phone]      Total Time: 45 minutes    "

## 2025-02-14 NOTE — PLAN OF CARE
Patient alert, orientation fluctuates. Consistently oriented to self and hospital, fluctuates on city, time and situation. Bipap removed a 0800 and replaced with NC 2L. Sats maintained mid-90s. BP elevated, below PRN parameters. Tele NSR. TF started at 1600. NPO w/ ice chips. PIV SL. LS diminished. DEVRIES, no shortness of breath at rest. Rash under skin folds, areas cleansed and cream/powder applied. Bottom red blanchable. Patient repositioned q2. PRN Tylenol given for headache and helpful. One BM this shift, used bedpan. Continue to monitor.

## 2025-02-14 NOTE — PROGRESS NOTES
"Ms. Herr is seen on dialysis. Originaly admitted with inf A. Blood pressures at start are in the 180's. L AVF is functional with excellent blood flows (450). She is feeling fine but says that she isn't particularly hungry. She continues with tube feeds (she gets about 1 L a day). On Wed she tolerated her run well without RVR but developed sig cramping towards the end and requested to come off 30 min early. Chronically c/o SOB but has no complaints today and is on NC. Speech eval is planned. Several unmeasured stools per day.     Vital signs:  Temp: 98.1  F (36.7  C) Temp src: Oral BP: (!) 165/50 Pulse: 84   Resp: 20 SpO2: 99 % O2 Device: None (Room air) Oxygen Delivery: 1 LPM Height: 170.2 cm (5' 7.01\") Weight: 97.3 kg (214 lb 8.1 oz)  Estimated body mass index is 33.59 kg/m  as calculated from the following:    Height as of this encounter: 1.702 m (5' 7.01\").    Weight as of this encounter: 97.3 kg (214 lb 8.1 oz).      Gen: on dialysis. NAD. Oritented to person and place but intermittently seems confused  Lungs: Clear anterior  Card: regular, no rub  Ext: L BKA. No edema   Access: good thrill and bruit. L hand is warm    Last Comprehensive Metabolic Panel:  Lab Results   Component Value Date     02/14/2025    POTASSIUM 3.7 02/14/2025    CHLORIDE 94 (L) 02/14/2025    CO2 29 02/14/2025    ANIONGAP 14 02/14/2025     (H) 02/14/2025    BUN 84.9 (H) 02/14/2025    CR 4.22 (H) 02/14/2025    GFRESTIMATED 10 (L) 02/14/2025    JODY 10.0 02/14/2025       Lab Results   Component Value Date    HGB 8.1 (L) 02/14/2025    HGB 8.3 (L) 02/13/2025       Lab Results   Component Value Date    PHOS 4.8 (H) 02/12/2025    PHOS 3.6 02/11/2025     A/p:  ESKD: MWF via L upper arm AVF. Outpt unit is TRS with Dr. Basilio.     2. Anemia: normally on mircera. On EPO here will receive a dose today    3. Bone Mineral: phos is controlled. Sevelemir has been held  4. Appetite: She may not get hungry if all of her calories are being " provided by tube feeds. We may need to decrease to allow her to feel hungry.     Alysia Roman MD MS

## 2025-02-14 NOTE — PLAN OF CARE
1900-7:30am    Orientations: A&O x 2-3 forgetful at times.  Vitals/Pain: VSS- BP elevated, BIPAP 30%, No C/O Pain or SOB.   Tele: SR  Lines/Drains: PIV x 1 SL, NG Tube in place, AV fistula on Left upper arm- bruit and thrill present   Skin/Wounds: Redness blanchable on coccyx, redness under breast and groin area.  GI/: NPO- Q 4 BS checks, Tube feed running over night, small BM during shift.   Ambulation/Assist: Assist of 2 lift, repositioned Q 2 hours.   Sleep Quality: Sleeping between cares.  Plan: Plan for dialysis today, monitor O2 stats, speech evaluation.

## 2025-02-14 NOTE — PROGRESS NOTES
Care Management Follow Up    Length of Stay (days): 37    Expected Discharge Date: 02/17/2025     Concerns to be Addressed: other (see comments) (elevated risk)     Patient plan of care discussed at interdisciplinary rounds: Yes    Anticipated Discharge Disposition: Skilled Nursing Facility              Anticipated Discharge Services: None  Anticipated Discharge DME: None    Patient/family educated on Medicare website which has current facility and service quality ratings: no  Education Provided on the Discharge Plan: No  Patient/Family in Agreement with the Plan: yes    Referrals Placed by CM/SW:    Private pay costs discussed: Not applicable    Discussed  Partnership in Safe Discharge Planning  document with patient/family: No     Handoff Completed: Yes, [facility] PCP: Internal handoff referral completed    Additional Information:  SW noted request for Gtube - SW sent referrals for the following TCU as pt indicated prior- [facility], [facility], [facility], [facility], [facility], [facility].      Addendum 1555-  [facility] declined- Do not do Bipap is what message indicated.   SW sent fax to [facility] with referral.     Next Steps: Med ready, Secure TCU bed, arrange transport if needed.     Rafia Ugalde, CARLITOSW  Social Work  [facility].

## 2025-02-14 NOTE — PROGRESS NOTES
Potassium   Date Value Ref Range Status   02/14/2025 3.7 3.4 - 5.3 mmol/L Final   02/02/2022 4.7 3.4 - 5.3 mmol/L Final   04/17/2021 4.2 3.4 - 5.3 mmol/L Final     Hemoglobin   Date Value Ref Range Status   02/14/2025 8.1 (L) 11.7 - 15.7 g/dL Final   04/16/2021 10.9 (L) 11.7 - 15.7 g/dL Final     Creatinine   Date Value Ref Range Status   02/14/2025 4.22 (H) 0.51 - 0.95 mg/dL Final   04/17/2021 5.98 (H) 0.52 - 1.04 mg/dL Final     Urea Nitrogen   Date Value Ref Range Status   02/14/2025 84.9 (H) 8.0 - 23.0 mg/dL Final   11/24/2021 37 (H) 7 - 30 mg/dL Final   04/17/2021 68 (H) 7 - 30 mg/dL Final     Sodium   Date Value Ref Range Status   02/14/2025 137 135 - 145 mmol/L Final   04/17/2021 137 133 - 144 mmol/L Final     INR   Date Value Ref Range Status   04/24/2024 1.11 0.85 - 1.15 Final   04/16/2021 1.14 0.86 - 1.14 Final       DIALYSIS PROCEDURE NOTE  Hepatitis status of previous patient on machine log was checked and verified ok to use with this patients hepatitis status.  Patient dialyzed for 3.5 hrs. on a K3 bath with a net fluid removal of  1L.  A BFR of 450 ml/min was obtained via a LUE AVG using 15 gauge needles.      The treatment plan was discussed with Dr. Roman during the treatment.    Total heparin received during the treatment: 0 units.   Needle cannulation sites held x 10 min.     Meds  given: Retacrit, see MAR   Complications: None, tx tolerated as ordered      ICEBOAT? Timeout performed pre-treatment  I: Patient was identified using 2 identifiers  C:  Consent Signed Yes  E: Equipment preventative maintenance is current and dialysis delivery system OK to use  B: Hepatitis B Surface Antigen: negative; Draw Date: 2/6/25  O: Dialysis orders present and complete prior to treatment  A: Vascular access verified and assessed prior to treatment  T: Treatment was performed at a clinically appropriate time  ?: Patient was allowed to ask questions and address concerns prior to treatment  See Adult Hemodialysis  flowsheet in EPIC for further details and post assessment.  Machine water alarm in place and functioning. Transducer pods intact and checked every 15min.   Pt returned via Bed.  Chlorine/Chloramine water system checked every 4 hours.    Patient repositioned every 2 hours during the treatment.  Post treatment report given to MABEL Michelle RN regarding 1L of fluid removed, last BP of 124/46, and patient pain rating of 0/10.     Please remove patient dressing on AVF and AVG needle sites 24 hours after dialysis. If leaking occurs please apply a Band-Aid.

## 2025-02-14 NOTE — PROGRESS NOTES
[facility]    Medicine Progress Note - Hospitalist Service    Date of Admission:  1/8/2025    Assessment & Plan   Supriya Herr is a 74 year old female with PMHx significant for ESRD on HD, CHF, COPD, poor mobility (Lt AKA, Obesity, WC bound), DM type II, hypertension.  She was brought to the ER by EMS for evaluation of shortness of breath, diagnosed with influenza A and admitted on 1/8/25       Hospital course summary  Patient was admitted, subsequently intubated for worsening respiratory failure and remained on mechanical ventilator 1/9 - 1/18.  She was reintubated and remained on mechanical ventilator 1/20 - 1/25.  Was treated with Tamiflu, IV antibiotic, steroid, has completed courses.  Hospital course complicated by A-fib RVR.  Was on amiodarone drip with eventual transition to p.o. Also required diltiazem  for rate control.  Required BiPAP--HFNC--intermittent BiPAP use.  Ongoing tube feeding and modified diet per SLP.  Delirium managed with Seroquel.  Concern for recurrent pneumonia, hospital-acquired on 2/1, restarted on antibiotics.  ID consulted, recommended discontinuing antibiotics, respiratory failure likely related to fluid overload.     Patient developed symptomatic bradycardia on 2/5/25 triggering a RRT. She was placed on dopamine drip and transferred back to the ICU. Bradycardia was likely due to rate controlling medications. Cardiology consulted with adjustment of medications. Patient has remained in NSR without recurrence of bradycardia on amio drip. While in the ICU, patient required dialysis on pressors, now transitioned to midodrine . Hypoxic and hypercarbic resp failure is much improved with dialysis.      Patient transferred back to hospitalist service on 2/8/25.      Acute hypoxic respiratory failure-improving  Influenza A pneumonia  Acute COPD exacerbation  Hospital-acquired pneumonia  Pharyngeal edema  -Initial presentation with shortness of breath, diagnosed  influenza A. Initial course as noted above.  -Had bronchoscopy and BAL 1/20, culture grew actinomyces, CT chest showed RML infiltrate versus atelectasis but no sign of actinomycosis per intensivist and felt this was likely colonization.    -Pneumonia treated with cefepime and vancomycin, cefepime changed to Levaquin and completed antibiotic course 1/26.  -Was treated with IV steroid and Tamiflu and completed the course  -2/1 given increased oxygen need during earlier course of hospital stay there was concern about infection again and blood cultures done on 2/1 have been negative and Pro-Geraldo was elevated and CT chest was concerning for right lower lobe pneumonia and ID was consulted and patient was again started on vancomycin and Levaquin  -ID was consulted and they recommended to stop antibiotics and respiratory failure was likely due to fluid overload and patient was improving with dialysis  -Continue nebs-budesonide 1 Mg twice daily, Neb 4 times daily,    - Continue to wean O2     Recurrent atrial fibrillation with RVR now in normal sinus rhythm  Acute on chronic CHFpEF  Hypertension  Shock, due to sedation-resolved  Symptomatic bradycardia-Resolved  -PTA is on amlodipine 5 Mg twice daily, Coreg 25 Mg p.o. twice daily and Lasix.  -TTE this is stay shows LVEF 60%, no pericardial effusion, no significant valvular disease, diastolic function indeterminate.  No WMA.  -Cardiology was following the patient and patient earlier was on amiodarone drip which was started on 1/27 for 14-day course and patient was also started on Coreg and dose was uptitrated to PTA dose of 25 twice daily on 2/2 along with Lipitor 20 mg and Eliquis 5 mg twice daily  -Patient developed symptomatic bradycardia on 2/5/25 triggering a RRT. She was placed on dopamine drip and transferred back to the ICU.  - Bradycardia was likely due to rate controlling medications, Coreg and dilt which were held and cardiology was re consulted and cardiology  recommended to continue amiodarone and dose was decreased to 200 mg daily and Coreg and diltiazem were discontinued and patient remained in normal sinus rhythm without bradycardia and cardiology signed off on 2/7  -2/10-developed atrial fibrillation with RVR and started on amiodarone drip and EP consulted  -2/11-converted to normal sinus rhythm and seen by EP and they recommended to finish the 24-hour IV load and then switch to amiodarone oral 200 twice daily    -Continue with Eliquis 5 twice daily, atorvastatin 20 mg daily and Lasix 80 mg daily(hold on dialysis days)  -Of note her blood pressure has been trending upwards. 2/12 restarted pta amlodipine 5 mg bid and start oral hydralazine 25 every eight hours and prn hydralazine available and uptitrate based on the BP  2/13: Increased hydralazine to 50 mg every 8     2/14: HOLD Apixaban and start on Heparin gtt in anticipation of G-tube Monday    Encephalopathy likely infectious and metabolic-improved  Anxiety  -Had CT head 1/20, negative for acute intracranial process.  Volume loss and chronic microvascular ischemic changes noted.  Suspected some baseline cognitive impairment.    -Of note has been having jerking movements and she has been in the hospital  -On p.m. of 2/9 patient had agitation and had to receive Zyprexa  -2/10 patient was only AO x 1  -2/10 CT head did not show any acute intracranial process  -2/10 EEG--> showed moderate generalized slowing and triphasic waves which commonly seen in chronic kidney disease.   -2/11-patient mentation is pretty good and she is little sleepy but is conversing and is AO x 2 to 3 most likely her encephalopathy is a combination of infectious and metabolic  -2/12-patient is again fluctuating in mentation and is AO x 2 to 3 and she was following commands while on the BiPAP and moved her upper and lower extremity and did new that she is in the hospital, where she lived, name of the president  -Appreciate input from the  neurology team and they have ordered multivitamins     -Continue Seroquel  -Continue PTA Zoloft and gabapentin.  -Delirium precautions.  -PT, OT eval.    Diabetes mellitus, type 2  -HbA1c 6.5% on 1/17/25.  -PTA is on Lantus 18 units daily and NovoLog 5 units 3 times daily.  -Continue with Lantus 15 units twice daily and every 4 hours sliding scale insulin  Recent Labs   Lab 02/14/25  0821 02/14/25  0633 02/14/25  0454 02/14/25  0028 02/13/25 2012 02/13/25  1651   * 251* 229* 180* 165* 104*     -Hypoglycemia protocol     ESRD on HD , wasTue-Thu-Sat, now MWF  Anemia of chronic disease  Hyperkalemia-resolved  -Nephrology following-managing dialysis needs.  -Daily renal panel.  -Hemoglobin 7-8, at baseline. And is 8.9  -Gets EPO with hemodialysis.  -Continue  vitamin D3.  And will hold her Renvela while npo as it can't be crushed  -Nephrology resumed Lasix on nondialysis days on 2/3.    -Lokelma discontinued on 2/10 as per discussion with nephrology  -Continue with dialysis as per the nephrology team     Constipation  -Noted on CT 2/2  -Continue the bowel regimen      Dysphagia  Suspect due to recurrent intubation.  -Dietitian consulted.  -Continue tube feedings for now.  -Did well with video swallow study on 1/31/25, diet advanced.  But again was made n.p.o. on 2/11  -continue cycling tube feeds.    *Will discuss with SLP if any specific reason for dysphagia.  Patient has been started on nystatin, may consider fluconazole.  Discussed in detail with daughter on 02/14, if continues to be n.p.o. then needs G-tube.  Discussed with IR: Will hold patient apixaban in anticipation for G-tube on Monday.  Started on heparin gtt.    Likely earwax  -Patient did mention on 2/11 that she was having some ear pressure and some ringing which is much better on 2/12 and will order Debrox drops         Diet: Room Service  NPO for Medical/Clinical Reasons Except for: Ice Chips, NPO but receiving Tube Feeding  Adult Formula Drip  "Feeding: Continuous Grecia Estrella Renal Support; Nasoduodenal tube; Goal Rate: 65; mL/hr; From: 4:00 PM; To: 6:00 AM; Change formula. Restart at new goal, run x14 hours nightly for total volume 910 mL  NPO per Anesthesia Guidelines for Procedure/Surgery Except for: Meds, Ice Chips  Calorie Counts    DVT Prophylaxis: DOAC  Bradshaw Catheter: Not present  Lines: None     Cardiac Monitoring: None  Code Status: Full Code      Clinically Significant Risk Factors          # Hypochloremia: Lowest Cl = 94 mmol/L in last 2 days, will monitor as appropriate      # Hypoalbuminemia: Lowest albumin = 2.8 g/dL at 1/13/2025  5:14 AM, will monitor as appropriate     # Hypertension: Noted on problem list    # Chronic heart failure with preserved ejection fraction: heart failure noted on problem list and last echo with EF >50%    # Acute Hypercapnic Respiratory Failure: based on venous blood gas results.  Continue supplemental oxygen and ventilatory support as indicated.        # DMII: A1C = 6.5 % (Ref range: <5.7 %) within past 6 months   # Obesity: Estimated body mass index is 33.59 kg/m  as calculated from the following:    Height as of this encounter: 1.702 m (5' 7.01\").    Weight as of this encounter: 97.3 kg (214 lb 8.1 oz).        # Financial/Environmental Concerns: none         Social Drivers of Health    Tobacco Use: Medium Risk (9/11/2024)    Patient History     Smoking Tobacco Use: Former     Smokeless Tobacco Use: Never   Physical Activity: Insufficiently Active (7/19/2024)    Exercise Vital Sign     Days of Exercise per Week: 1 day     Minutes of Exercise per Session: 10 min   Social Connections: Unknown (7/19/2024)    Social Connection and Isolation Panel [NHANES]     Frequency of Social Gatherings with Friends and Family: More than three times a week          Disposition Plan     Medically Ready for Discharge: Anticipated in 2-4 Days     Greg Beebe MD  Hospitalist Service  [facility]  Securely " "message with [employer] (more info)  Text page via [facility] Paging/Directory   \"This dictation was performed with voice recognition software and may contain errors,  omissions and inadvertent word substitution.\"    ______________________________________________________________________    Interval History   Feeling okay   Denies any chest pain/palpitations  Says does not feel like eating   Physical Exam   BP (!) 154/53   Pulse 85   Temp 97.9  F (36.6  C) (Oral)   Resp 20   Ht 1.702 m (5' 7.01\")   Wt 97.3 kg (214 lb 8.1 oz)   LMP  (LMP Unknown)   SpO2 95%   BMI 33.59 kg/m    Gen- pleasant   Neck- supple  CVS- I+II+ no m/r/g  RS- CTAB  Abdo- soft, no tenderness . No g/r/r  Ext- no edema     Medical Decision Making       51 MINUTES SPENT BY ME on the date of service doing chart review, history, exam, documentation & further activities per the note.  Discussed with patient, bedside RN, patient's daughter and interventional radiology    Data   ------------------------- PAST 24 HR DATA REVIEWED -----------------------------------------------    I have personally reviewed the following data over the past 24 hrs:    4.2  \   8.1 (L)   / 161     137 94 (L) 84.9 (H) /  244 (H)   3.7 29 4.22 (H) \       Imaging results reviewed over the past 24 hrs:   No results found for this or any previous visit (from the past 24 hours).  "

## 2025-02-15 LAB
ANION GAP SERPL CALCULATED.3IONS-SCNC: 11 MMOL/L (ref 7–15)
APTT PPP: 50 SECONDS (ref 22–38)
BUN SERPL-MCNC: 51 MG/DL (ref 8–23)
CALCIUM SERPL-MCNC: 10.4 MG/DL (ref 8.8–10.4)
CHLORIDE SERPL-SCNC: 98 MMOL/L (ref 98–107)
CREAT SERPL-MCNC: 2.92 MG/DL (ref 0.51–0.95)
EGFRCR SERPLBLD CKD-EPI 2021: 16 ML/MIN/1.73M2
ERYTHROCYTE [DISTWIDTH] IN BLOOD BY AUTOMATED COUNT: 15.9 % (ref 10–15)
GLUCOSE BLDC GLUCOMTR-MCNC: 100 MG/DL (ref 70–99)
GLUCOSE BLDC GLUCOMTR-MCNC: 123 MG/DL (ref 70–99)
GLUCOSE BLDC GLUCOMTR-MCNC: 126 MG/DL (ref 70–99)
GLUCOSE BLDC GLUCOMTR-MCNC: 132 MG/DL (ref 70–99)
GLUCOSE BLDC GLUCOMTR-MCNC: 158 MG/DL (ref 70–99)
GLUCOSE BLDC GLUCOMTR-MCNC: 216 MG/DL (ref 70–99)
GLUCOSE SERPL-MCNC: 117 MG/DL (ref 70–99)
HCO3 SERPL-SCNC: 30 MMOL/L (ref 22–29)
HCT VFR BLD AUTO: 27.6 % (ref 35–47)
HGB BLD-MCNC: 8.3 G/DL (ref 11.7–15.7)
MAGNESIUM SERPL-MCNC: 2.2 MG/DL (ref 1.7–2.3)
MCH RBC QN AUTO: 29.9 PG (ref 26.5–33)
MCHC RBC AUTO-ENTMCNC: 30.1 G/DL (ref 31.5–36.5)
MCV RBC AUTO: 99 FL (ref 78–100)
PLATELET # BLD AUTO: 199 10E3/UL (ref 150–450)
POTASSIUM SERPL-SCNC: 4 MMOL/L (ref 3.4–5.3)
RBC # BLD AUTO: 2.78 10E6/UL (ref 3.8–5.2)
SODIUM SERPL-SCNC: 139 MMOL/L (ref 135–145)
WBC # BLD AUTO: 4 10E3/UL (ref 4–11)

## 2025-02-15 PROCEDURE — 250N000009 HC RX 250: Performed by: HOSPITALIST

## 2025-02-15 PROCEDURE — 85730 THROMBOPLASTIN TIME PARTIAL: CPT | Performed by: INTERNAL MEDICINE

## 2025-02-15 PROCEDURE — 250N000013 HC RX MED GY IP 250 OP 250 PS 637: Performed by: INTERNAL MEDICINE

## 2025-02-15 PROCEDURE — 85018 HEMOGLOBIN: CPT | Performed by: INTERNAL MEDICINE

## 2025-02-15 PROCEDURE — 999N000157 HC STATISTIC RCP TIME EA 10 MIN

## 2025-02-15 PROCEDURE — 250N000011 HC RX IP 250 OP 636: Performed by: PSYCHIATRY & NEUROLOGY

## 2025-02-15 PROCEDURE — 83735 ASSAY OF MAGNESIUM: CPT | Performed by: HOSPITALIST

## 2025-02-15 PROCEDURE — 250N000013 HC RX MED GY IP 250 OP 250 PS 637: Performed by: HOSPITALIST

## 2025-02-15 PROCEDURE — 99233 SBSQ HOSP IP/OBS HIGH 50: CPT | Performed by: INTERNAL MEDICINE

## 2025-02-15 PROCEDURE — 99232 SBSQ HOSP IP/OBS MODERATE 35: CPT | Performed by: INTERNAL MEDICINE

## 2025-02-15 PROCEDURE — 120N000013 HC R&B IMCU

## 2025-02-15 PROCEDURE — 250N000011 HC RX IP 250 OP 636: Performed by: INTERNAL MEDICINE

## 2025-02-15 PROCEDURE — 80051 ELECTROLYTE PANEL: CPT | Performed by: HOSPITALIST

## 2025-02-15 PROCEDURE — 94640 AIRWAY INHALATION TREATMENT: CPT | Mod: 76

## 2025-02-15 PROCEDURE — 999N000128 HC STATISTIC PERIPHERAL IV START W/O US GUIDANCE

## 2025-02-15 PROCEDURE — 93010 ELECTROCARDIOGRAM REPORT: CPT | Performed by: INTERNAL MEDICINE

## 2025-02-15 PROCEDURE — 94660 CPAP INITIATION&MGMT: CPT

## 2025-02-15 PROCEDURE — 80048 BASIC METABOLIC PNL TOTAL CA: CPT | Performed by: HOSPITALIST

## 2025-02-15 PROCEDURE — 250N000011 HC RX IP 250 OP 636: Performed by: NURSE PRACTITIONER

## 2025-02-15 PROCEDURE — 36415 COLL VENOUS BLD VENIPUNCTURE: CPT | Performed by: INTERNAL MEDICINE

## 2025-02-15 PROCEDURE — 36415 COLL VENOUS BLD VENIPUNCTURE: CPT | Performed by: HOSPITALIST

## 2025-02-15 PROCEDURE — 250N000013 HC RX MED GY IP 250 OP 250 PS 637: Performed by: PHYSICIAN ASSISTANT

## 2025-02-15 PROCEDURE — 94640 AIRWAY INHALATION TREATMENT: CPT

## 2025-02-15 PROCEDURE — 93005 ELECTROCARDIOGRAM TRACING: CPT

## 2025-02-15 PROCEDURE — 99291 CRITICAL CARE FIRST HOUR: CPT | Mod: 25 | Performed by: NURSE PRACTITIONER

## 2025-02-15 RX ORDER — LIDOCAINE 40 MG/G
CREAM TOPICAL
Status: DISCONTINUED | OUTPATIENT
Start: 2025-02-15 | End: 2025-02-26 | Stop reason: HOSPADM

## 2025-02-15 RX ADMIN — SENNOSIDES 10 ML: 8.8 LIQUID ORAL at 21:28

## 2025-02-15 RX ADMIN — IPRATROPIUM BROMIDE 0.5 MG: 0.5 SOLUTION RESPIRATORY (INHALATION) at 19:02

## 2025-02-15 RX ADMIN — SERTRALINE HYDROCHLORIDE 75 MG: 50 TABLET ORAL at 21:23

## 2025-02-15 RX ADMIN — IPRATROPIUM BROMIDE 0.5 MG: 0.5 SOLUTION RESPIRATORY (INHALATION) at 11:09

## 2025-02-15 RX ADMIN — Medication 5 ML: at 09:36

## 2025-02-15 RX ADMIN — CETIRIZINE HYDROCHLORIDE 5 MG: 5 TABLET ORAL at 21:24

## 2025-02-15 RX ADMIN — IPRATROPIUM BROMIDE 0.5 MG: 0.5 SOLUTION RESPIRATORY (INHALATION) at 07:49

## 2025-02-15 RX ADMIN — Medication 60 ML: at 10:56

## 2025-02-15 RX ADMIN — HEPARIN SODIUM 1150 UNITS/HR: 10000 INJECTION, SOLUTION INTRAVENOUS at 12:15

## 2025-02-15 RX ADMIN — ACETAMINOPHEN 650 MG: 325 TABLET, FILM COATED ORAL at 09:35

## 2025-02-15 RX ADMIN — AMIODARONE HYDROCHLORIDE 1 MG/MIN: 1.8 INJECTION, SOLUTION INTRAVENOUS at 14:48

## 2025-02-15 RX ADMIN — HYDRALAZINE HYDROCHLORIDE 50 MG: 50 TABLET ORAL at 09:21

## 2025-02-15 RX ADMIN — TACROLIMUS: 1 OINTMENT TOPICAL at 21:25

## 2025-02-15 RX ADMIN — BUDESONIDE 1 MG: 1 INHALANT ORAL at 19:06

## 2025-02-15 RX ADMIN — Medication 50 MCG: at 09:21

## 2025-02-15 RX ADMIN — PANTOPRAZOLE SODIUM 40 MG: 40 INJECTION, POWDER, FOR SOLUTION INTRAVENOUS at 09:21

## 2025-02-15 RX ADMIN — HYDRALAZINE HYDROCHLORIDE 50 MG: 50 TABLET ORAL at 16:17

## 2025-02-15 RX ADMIN — LEVALBUTEROL HYDROCHLORIDE 1.25 MG: 1.25 SOLUTION RESPIRATORY (INHALATION) at 11:08

## 2025-02-15 RX ADMIN — MICONAZOLE NITRATE: 2 POWDER TOPICAL at 21:25

## 2025-02-15 RX ADMIN — AMIODARONE HYDROCHLORIDE 150 MG: 1.5 INJECTION, SOLUTION INTRAVENOUS at 14:16

## 2025-02-15 RX ADMIN — CARBAMIDE PEROXIDE 6.5% 5 DROP: 6.5 LIQUID AURICULAR (OTIC) at 21:25

## 2025-02-15 RX ADMIN — HYDRALAZINE HYDROCHLORIDE 50 MG: 50 TABLET ORAL at 21:48

## 2025-02-15 RX ADMIN — INSULIN ASPART 1 UNITS: 100 INJECTION, SOLUTION INTRAVENOUS; SUBCUTANEOUS at 21:18

## 2025-02-15 RX ADMIN — LEVALBUTEROL HYDROCHLORIDE 1.25 MG: 1.25 SOLUTION RESPIRATORY (INHALATION) at 07:49

## 2025-02-15 RX ADMIN — INSULIN ASPART 1 UNITS: 100 INJECTION, SOLUTION INTRAVENOUS; SUBCUTANEOUS at 01:29

## 2025-02-15 RX ADMIN — METOPROLOL TARTRATE 2.5 MG: 5 INJECTION INTRAVENOUS at 13:29

## 2025-02-15 RX ADMIN — TACROLIMUS: 1 OINTMENT TOPICAL at 09:22

## 2025-02-15 RX ADMIN — INSULIN GLARGINE 15 UNITS: 100 INJECTION, SOLUTION SUBCUTANEOUS at 09:22

## 2025-02-15 RX ADMIN — BUDESONIDE 1 MG: 1 INHALANT ORAL at 07:49

## 2025-02-15 RX ADMIN — ATORVASTATIN CALCIUM 20 MG: 20 TABLET, FILM COATED ORAL at 21:24

## 2025-02-15 RX ADMIN — IPRATROPIUM BROMIDE 0.5 MG: 0.5 SOLUTION RESPIRATORY (INHALATION) at 15:12

## 2025-02-15 RX ADMIN — AMIODARONE HYDROCHLORIDE 200 MG: 200 TABLET ORAL at 21:24

## 2025-02-15 RX ADMIN — THIAMINE HYDROCHLORIDE 200 MG: 100 INJECTION, SOLUTION INTRAMUSCULAR; INTRAVENOUS at 09:35

## 2025-02-15 RX ADMIN — QUETIAPINE FUMARATE 50 MG: 50 TABLET ORAL at 21:24

## 2025-02-15 RX ADMIN — AMIODARONE HYDROCHLORIDE 0.5 MG/MIN: 1.8 INJECTION, SOLUTION INTRAVENOUS at 20:30

## 2025-02-15 RX ADMIN — AMIODARONE HYDROCHLORIDE 200 MG: 200 TABLET ORAL at 09:21

## 2025-02-15 RX ADMIN — AMLODIPINE BESYLATE 5 MG: 5 TABLET ORAL at 09:21

## 2025-02-15 RX ADMIN — MICONAZOLE NITRATE: 2 POWDER TOPICAL at 09:22

## 2025-02-15 RX ADMIN — LEVALBUTEROL HYDROCHLORIDE 1.25 MG: 1.25 SOLUTION RESPIRATORY (INHALATION) at 19:02

## 2025-02-15 RX ADMIN — LEVALBUTEROL HYDROCHLORIDE 1.25 MG: 1.25 SOLUTION RESPIRATORY (INHALATION) at 15:12

## 2025-02-15 RX ADMIN — INSULIN GLARGINE 15 UNITS: 100 INJECTION, SOLUTION SUBCUTANEOUS at 21:18

## 2025-02-15 RX ADMIN — FUROSEMIDE 80 MG: 40 TABLET ORAL at 09:21

## 2025-02-15 RX ADMIN — AMLODIPINE BESYLATE 5 MG: 5 TABLET ORAL at 19:44

## 2025-02-15 ASSESSMENT — ACTIVITIES OF DAILY LIVING (ADL)
ADLS_ACUITY_SCORE: 83

## 2025-02-15 NOTE — CODE/RAPID RESPONSE
[facility]    RRT Note  2/15/2025   Time Called: 1:14 PM    RRT called for: Recurrent afib with RVR    HPI:    Supriya Herr is a 75 year old female w/ PMH of ESRD on HD, CHF, COPD, poor mobility (Lt AKA, Obesity, WC bound), DM type II, hypertension who was admitted to the hospital with influenza A on 1/8/25.  Course has been a long and complicated by acute on chronic resp failure requiring MV, afib w/ RVR, now on amiodarone, dysphagia pending peg tube, delriium, recurrent pneumonia and bradycardia thought to be from rate controlling meds (amio decreased to 200mg daily and coreg and cardizem were stopped).  After this reason she was resumed on amiodarone IV now back on oral.    Patient was in the chair when she became hypotensive with BPs 85/50-60 and in afib w/ RVR.  Because of the A-fib with heart rates as high as 160s RRT was activated and patient was returned to bed.  On return to bed to 132/68 and 116/90.  On my arrival patient is being lifted back into bed.  She endorses palpitations mild chest pain denies any dyspnea unclear if she is presyncopal or not.  Staff reports she received 8 mg oral Lasix earlier in the morning.  She is currently on a heparin infusion as a bridge from her DOAC while awaiting G-tube.  There is no obvious bleeding.  Patient's last dialysis was 2/14/2025 for 1 L ultrafiltration.    Assessment & Plan   Recurrent afib w/ RVR with mild symptoms of palpitations and some degree of chest pain, resolved spontaneously: Suspect this is recurrence of likely paroxysmal A-fib.  Electrolytes are balanced, she is already anticoagulated thereby making PE less likely.  Clinically appears euvolemic though I&O reflect +2.2 L last 24 hours    INTERVENTIONS:  -stat EKG, see below  -IV metoprolol totalling 2.5mg   -no significant improvement w/ IV metoprol so will redose 150mg amiodarone over 30 minutes and then start infusion.  This was shared decision making with the  hospitalist attending physician who coincidentally rounded on patient during the RRT  -reconsult cardiology to assist w/ mgmt, I have spoken with the on-call cardiologist by phone today  -resume IMC status  -Recheck EKG when rate is slower to reevaluate T wave changes (ordered)      Last 24H PRN:     acetaminophen (TYLENOL) tablet 650 mg, 650 mg at 02/15/25 0935 **OR** acetaminophen (TYLENOL) Suppository 650 mg    metoprolol (LOPRESSOR) injection 2.5-5 mg, 2.5 mg at 02/15/25 1329    Working diagnosis: Recurrent A-fib with RVR likely paroxysmal    At the end of the RRT IV staff are placing additional access for the amiodarone bolus and infusion    Disposition: continue care in current location, add IMC status    Discussed with and defer further cares to hospitalist attending physician Dr. Beebe     Code Status: Full Code    Physical Exam   Vital Signs with Ranges:  Temp:  [97.8  F (36.6  C)-98.4  F (36.9  C)] 98  F (36.7  C)  Pulse:  [] 113  Resp:  [18-21] 18  BP: ()/(36-94) 116/94  FiO2 (%):  [30 %] 30 %  SpO2:  [87 %-100 %] 96 %  I/O last 3 completed shifts:  In: 0   Out: 1000 [Other:1000]    Constitutional: vs as above and/or per EMR  General:  adult pt lying in bed without acute distress, chatty   GCS:   Motor 6=Obeys commands   Verbal 5=Oriented   Eye Opening 4=Spontaneous   Total: 15     Neuro: +follows commands show 2 fingers bilat, face symmetric, tongue midline, speech fluent  Head, ENT & mouth: NC/AT, dry oral mucosa  Neck: supple  CV irregularly irregular rhythm, BPs ranging from 80s up to 130s systolic  resp: CTAB upper lobes  gi:normoactive bowel sounds, soft, nontender, nondisteded  Ext: no edema or mottling  Skin: no rashes on exposed skin  Musculoskeletal no bony joint deformities, left BKA      Data     EKG:  Interpreted by PENELOPE Abdullahi CNP  Time reviewed: 1:26 PM  Symptoms at time of EKG: Palpitations  Rhythm: atrial fibrillation - rapid  Rate: 130  Axis: Normal  Ectopy:  none  Conduction: normal  ST Segments/ T Waves: ST Segment Depression V3, V4, V5, and V6 and T wave inversion II and aVF  Q Waves: none  Comparison to prior: Multiple EKGs reviewed, above described changes are similar when patient was in A-fib with RVR on 2/10/2025, they are not present when she was in sinus on 2/8/2025    Clinical Impression: A-fib with RVR with above-described changes seem to be present when she is in a rapid rate      CBC with Diff:  Recent Labs   Lab Test 02/15/25  0842 02/14/25  1421   WBC 4.0 4.4   HGB 8.3* 8.4*   MCV 99 98    184   INR  --  1.57*        Comprehensive Metabolic Panel:  Recent Labs   Lab 02/15/25  1150 02/15/25  0919 02/15/25  0842 02/12/25  0812 02/12/25  0523   NA  --   --  139   < > 137   POTASSIUM  --   --  4.0   < > 4.8   CHLORIDE  --   --  98   < > 93*   CO2  --   --  30*   < > 30*   ANIONGAP  --   --  11   < > 14   *   < > 117*   < > 183*   BUN  --   --  51.0*   < > 95.9*   CR  --   --  2.92*   < > 4.28*   GFRESTIMATED  --   --  16*   < > 10*   JODY  --   --  10.4   < > 10.2   MAG  --   --  2.2   < > 2.7*   PHOS  --   --   --   --  4.8*    < > = values in this interval not displayed.       INR:    Recent Labs   Lab Test 02/14/25  1421   INR 1.57*       Time Spent on this Encounter   I spent 35 minutes of critical care time on the unit/floor managing the care of Supriya Herr. Upon evaluation, this patient had a high probability of imminent or life-threatening deterioration due to A-fib with RVR, which required my direct attention, intervention, and personal management including provision of IV antiarrhythmics 100% of my time was spent at the bedside counseling the patient and/or coordinating care regarding services listed in this note.    PENELOPE Abdullahi [facility]  Hospitalist Service  [facility]  Securely message with Vocera (more info)  Text page via [facility] Paging/Directory

## 2025-02-15 NOTE — PROGRESS NOTES
[facility]    Medicine Progress Note - Hospitalist Service    Date of Admission:  1/8/2025    Assessment & Plan   Supriya Herr is a 74 year old female with PMHx significant for ESRD on HD, CHF, COPD, poor mobility (Lt AKA, Obesity, WC bound), DM type II, hypertension.  She was brought to the ER by EMS for evaluation of shortness of breath, diagnosed with influenza A and admitted on 1/8/25       Hospital course summary  Patient was admitted, subsequently intubated for worsening respiratory failure and remained on mechanical ventilator 1/9 - 1/18.  She was reintubated and remained on mechanical ventilator 1/20 - 1/25.  Was treated with Tamiflu, IV antibiotic, steroid, has completed courses.  Hospital course complicated by A-fib RVR.  Was on amiodarone drip with eventual transition to p.o. Also required diltiazem  for rate control.  Required BiPAP--HFNC--intermittent BiPAP use.  Ongoing tube feeding and modified diet per SLP.  Delirium managed with Seroquel.  Concern for recurrent pneumonia, hospital-acquired on 2/1, restarted on antibiotics.  ID consulted, recommended discontinuing antibiotics, respiratory failure likely related to fluid overload.     Patient developed symptomatic bradycardia on 2/5/25 triggering a RRT. She was placed on dopamine drip and transferred back to the ICU. Bradycardia was likely due to rate controlling medications. Cardiology consulted with adjustment of medications. Patient has remained in NSR without recurrence of bradycardia on amio drip. While in the ICU, patient required dialysis on pressors, now transitioned to midodrine . Hypoxic and hypercarbic resp failure is much improved with dialysis.      Patient transferred back to hospitalist service on 2/8/25.      Acute hypoxic respiratory failure-improving  Influenza A pneumonia  Acute COPD exacerbation  Hospital-acquired pneumonia  Pharyngeal edema  -Initial presentation with shortness of breath, diagnosed  influenza A. Initial course as noted above.  -Had bronchoscopy and BAL 1/20, culture grew actinomyces, CT chest showed RML infiltrate versus atelectasis but no sign of actinomycosis per intensivist and felt this was likely colonization.    -Pneumonia treated with cefepime and vancomycin, cefepime changed to Levaquin and completed antibiotic course 1/26.  -Was treated with IV steroid and Tamiflu and completed the course  -2/1 given increased oxygen need during earlier course of hospital stay there was concern about infection again and blood cultures done on 2/1 have been negative and Pro-Geraldo was elevated and CT chest was concerning for right lower lobe pneumonia and ID was consulted and patient was again started on vancomycin and Levaquin  -ID was consulted and they recommended to stop antibiotics and respiratory failure was likely due to fluid overload and patient was improving with dialysis  -Continue nebs-budesonide 1 Mg twice daily, Neb 4 times daily,    - Continue to wean O2 : Now on room air during daytime    Recurrent atrial fibrillation with RVR now in normal sinus rhythm  Acute on chronic CHFpEF  Hypertension  Shock, due to sedation-resolved  Symptomatic bradycardia-Resolved  -PTA is on amlodipine 5 Mg twice daily, Coreg 25 Mg p.o. twice daily and Lasix.  -TTE this is stay shows LVEF 60%, no pericardial effusion, no significant valvular disease, diastolic function indeterminate.  No WMA.  -Cardiology was following the patient and patient earlier was on amiodarone drip which was started on 1/27 for 14-day course and patient was also started on Coreg and dose was uptitrated to PTA dose of 25 twice daily on 2/2 along with Lipitor 20 mg and Eliquis 5 mg twice daily  -Patient developed symptomatic bradycardia on 2/5/25 triggering a RRT. She was placed on dopamine drip and transferred back to the ICU.  - Bradycardia was likely due to rate controlling medications, Coreg and dilt which were held and cardiology was  re consulted and cardiology recommended to continue amiodarone and dose was decreased to 200 mg daily and Coreg and diltiazem were discontinued and patient remained in normal sinus rhythm without bradycardia and cardiology signed off on 2/7  -2/10-developed atrial fibrillation with RVR and started on amiodarone drip and EP consulted  -2/11-converted to normal sinus rhythm and seen by EP and they recommended to finish the 24-hour IV load and then switch to amiodarone oral 200 twice daily    -Continue with Eliquis 5 twice daily, atorvastatin 20 mg daily and Lasix 80 mg daily(hold on dialysis days)  -Of note her blood pressure has been trending upwards. 2/12 restarted pta amlodipine 5 mg bid and start oral hydralazine 25 every eight hours and prn hydralazine available and uptitrate based on the BP  2/13: Increased hydralazine to 50 mg every 8     2/14: HOLD Apixaban and started on Heparin gtt in anticipation of G-tube Monday    Encephalopathy likely infectious and metabolic-improved  Anxiety  -Had CT head 1/20, negative for acute intracranial process.  Volume loss and chronic microvascular ischemic changes noted.  Suspected some baseline cognitive impairment.    -Of note has been having jerking movements and she has been in the hospital  -On p.m. of 2/9 patient had agitation and had to receive Zyprexa  -2/10 patient was only AO x 1  -2/10 CT head did not show any acute intracranial process  -2/10 EEG--> showed moderate generalized slowing and triphasic waves which commonly seen in chronic kidney disease.   -2/11-patient mentation is pretty good and she is little sleepy but is conversing and is AO x 2 to 3 most likely her encephalopathy is a combination of infectious and metabolic  -2/12-patient is again fluctuating in mentation and is AO x 2 to 3 and she was following commands while on the BiPAP and moved her upper and lower extremity and did new that she is in the hospital, where she lived, name of the  president  -Appreciate input from the neurology team and they have ordered multivitamins     -Continue Seroquel  -Continue PTA Zoloft and gabapentin.  -Delirium precautions.  -PT, OT eval.    Diabetes mellitus, type 2  -HbA1c 6.5% on 1/17/25.  -PTA is on Lantus 18 units daily and NovoLog 5 units 3 times daily.  -Continue with Lantus 15 units twice daily and every 4 hours sliding scale insulin  Recent Labs   Lab 02/15/25  1150 02/15/25  0919 02/15/25  0842 02/15/25  0338 02/15/25  0046 02/14/25 2014   * 126* 117* 123* 216* 162*     -Hypoglycemia protocol     ESRD on HD , wasTue-Thu-Sat, now MWF  Anemia of chronic disease  Hyperkalemia-resolved  -Nephrology following-managing dialysis needs.  -Daily renal panel.  -Hemoglobin 7-8, at baseline. And is 8.9  -Gets EPO with hemodialysis.  -Continue  vitamin D3.  And will hold her Renvela while npo as it can't be crushed  -Nephrology resumed Lasix on nondialysis days on 2/3.    -Lokelma discontinued on 2/10 as per discussion with nephrology  -Continue with dialysis as per the nephrology team     Constipation  -Noted on CT 2/2  -Continue the bowel regimen      Dysphagia  Suspect due to recurrent intubation.  -Dietitian consulted.  -Continue tube feedings for now.  -Did well with video swallow study on 1/31/25, diet advanced.  But again was made n.p.o. on 2/11  -continue cycling tube feeds.    *Will discuss with SLP if any specific reason for dysphagia.  Patient has been started on nystatin, may consider fluconazole.  Discussed in detail with daughter on 02/14, if continues to be n.p.o. then needs G-tube.  Discussed with IR: Will hold patient apixaban in anticipation for G-tube on Monday.  Started on heparin gtt.  -Will get ENT review to evaluate throat    Likely earwax  -Patient did mention on 2/11 that she was having some ear pressure and some ringing which is much better on 2/12 and will order Debrox drops         Diet: Room Service  NPO for Medical/Clinical  "Reasons Except for: Ice Chips, NPO but receiving Tube Feeding  Adult Formula Drip Feeding: Continuous [employer] Renal Support; Nasoduodenal tube; Goal Rate: 65; mL/hr; From: 4:00 PM; To: 6:00 AM; Change formula. Restart at new goal, run x14 hours nightly for total volume 910 mL  NPO per Anesthesia Guidelines for Procedure/Surgery Except for: Meds, Ice Chips  Calorie Counts    DVT Prophylaxis: DOAC  Bradshaw Catheter: Not present  Lines: None     Cardiac Monitoring: ACTIVE order. Indication: Bradycardias (48 hours)  Code Status: Full Code      Clinically Significant Risk Factors          # Hypochloremia: Lowest Cl = 94 mmol/L in last 2 days, will monitor as appropriate      # Hypoalbuminemia: Lowest albumin = 2.8 g/dL at 1/13/2025  5:14 AM, will monitor as appropriate  # Coagulation Defect: INR = 1.57 (Ref range: 0.85 - 1.15) and/or PTT = 35 Seconds (Ref range: 22 - 38 Seconds), will monitor for bleeding    # Hypertension: Noted on problem list    # Chronic heart failure with preserved ejection fraction: heart failure noted on problem list and last echo with EF >50%    # Acute Hypercapnic Respiratory Failure: based on venous blood gas results.  Continue supplemental oxygen and ventilatory support as indicated.        # DMII: A1C = 6.5 % (Ref range: <5.7 %) within past 6 months   # Obesity: Estimated body mass index is 33.59 kg/m  as calculated from the following:    Height as of this encounter: 1.702 m (5' 7.01\").    Weight as of this encounter: 97.3 kg (214 lb 8.1 oz).        # Financial/Environmental Concerns: none         Social Drivers of Health    Tobacco Use: Medium Risk (9/11/2024)    Patient History     Smoking Tobacco Use: Former     Smokeless Tobacco Use: Never   Physical Activity: Insufficiently Active (7/19/2024)    Exercise Vital Sign     Days of Exercise per Week: 1 day     Minutes of Exercise per Session: 10 min   Social Connections: Unknown (7/19/2024)    Social Connection and Isolation Panel [NHANES] " "    Frequency of Social Gatherings with Friends and Family: More than three times a week          Disposition Plan     Medically Ready for Discharge: Anticipated in 2-4 Days     Greg Beebe MD  Hospitalist Service  [facility]  Securely message with [employer] (more info)  Text page via [employer] Paging/Directory   \"This dictation was performed with voice recognition software and may contain errors,  omissions and inadvertent word substitution.\"    ______________________________________________________________________    Interval History   Feeling okay   Pleasantly confused.  Last night refused heparin, says she thought she is on DOAC    Denies any chest pain/palpitations    Physical Exam   BP (!) 153/51   Pulse 82   Temp 98  F (36.7  C) (Oral)   Resp 18   Ht 1.702 m (5' 7.01\")   Wt 97.3 kg (214 lb 8.1 oz)   LMP  (LMP Unknown)   SpO2 99%   BMI 33.59 kg/m    Gen- pleasant   Neck- supple  CVS- I+II+ no m/r/g  RS- CTAB  Abdo- soft, no tenderness . No g/r/r  Ext- no edema     Medical Decision Making       51 MINUTES SPENT BY ME on the date of service doing chart review, history, exam, documentation & further activities per the note.  Discussed with patient, bedside RN  Data   ------------------------- PAST 24 HR DATA REVIEWED -----------------------------------------------    I have personally reviewed the following data over the past 24 hrs:    4.0  \   8.3 (L)   / 199     139 98 51.0 (H) /  132 (H)   4.0 30 (H) 2.92 (H) \     Procal: N/A CRP: N/A Lactic Acid: 0.7       INR:  1.57 (H) PTT:  35   D-dimer:  N/A Fibrinogen:  N/A       Imaging results reviewed over the past 24 hrs:   No results found for this or any previous visit (from the past 24 hours).  "

## 2025-02-15 NOTE — PLAN OF CARE
"Goal Outcome Evaluation:    Patient Name: Geronimo  MRN: [id number]  Date of Admission: 1/8/2025  Reason for Admission: Acute Hypoxic RF d/t Influenza A Pneumonia  Level of Care: Acute    Vitals:   /44   Pulse 67   Temp 97.8  F (36.6  C) (Oral)   Resp 21   Ht 1.702 m (5' 7.01\")   Wt 97.3 kg (214 lb 8.1 oz)   LMP  (LMP Unknown)   SpO2 100%   BMI 33.59 kg/m      Pain: Denies    CV Surgery Patient: No    Assessment    Resp: LSD, fine crackles at bases, BIPAP at HS, 2L NC during the day.  Telemetry: SR  Neuro: A&O x2-3, confused.  GI/: +BS, small BM, on HD  Skin/Wounds: Redness to the coccyx, mepilex intact, rash under breast and groin  Lines/Drains: R PIV SL  Activity: A2 CL, T/R, Refused turning and repositioning.  Sleep: Well  Abnormal Labs: Pending AM lab results    Aggression Stop Light: Yellow          Patient Care Plan: NPO, on TF at 65mL/h, G-tube placement on Monday. Confused and refused Heparin gtt, aPTT 35 sec, Hospitalist notified.    Jacky Beal RN      "

## 2025-02-15 NOTE — PROGRESS NOTES
[facility] Cardiology Progress Note      Subjective     75-year-old woman with a prolonged hospitalization in the setting of respiratory failure and influenza, course complicated by paroxysmal atrial fibrillation with RVR and at times bradycardia while on high doses of candida agents.  Cardiology had last seen on 2/11 after another episode of AF with RVR.  She had been maintaining sinus rhythm until earlier today when atrial fibrillation recurred.  Now reconsulted for ongoing management.    Earlier today went into atrial fibrillation with RVR, rates initially in the 130s to 140s.  She describes some palpitations but is otherwise not too symptomatic.          Objective     VITAL SIGNS  Temp:  [97.8  F (36.6  C)-98.4  F (36.9  C)] 98  F (36.7  C)  Pulse:  [] 138  Resp:  [18-21] 18  BP: ()/(36-94) 135/57  FiO2 (%):  [30 %] 30 %  SpO2:  [92 %-100 %] 96 %  Admission Weight: 119 kg (262 lb 5.6 oz)  Current Weight: 97.3 kg (214 lb 8.1 oz)    PHYSICAL EXAMINATION  General:  Well-developed, well-nourished. No acute distress. Alert and oriented x 3.   Cardiovascular: Irregular, tachycardic, S1/2  Lungs:  Normal work of breathing. Clear to auscultation  Abdomen:  Soft, nontender  Extremities:  Warm, well perfused. 2+ radial pulses bilaterally.  No significant edema.   Neuro: Moving all extremities, no gross deficits noted      DIAGNOSTICS    Most Recent 3 CBC's:  Recent Labs   Lab Test 02/15/25  0842 02/14/25  1421 02/14/25  0633   WBC 4.0 4.4 4.2   HGB 8.3* 8.4* 8.1*   MCV 99 98 97    184 161     Most Recent 3 BMP's:  Recent Labs   Lab Test 02/15/25  1150 02/15/25  0919 02/15/25  0842 02/14/25  0821 02/14/25  0633 02/13/25  1031 02/13/25  0527   NA  --   --  139  --  137  --  139   POTASSIUM  --   --  4.0  --  3.7  --  3.5   CHLORIDE  --   --  98  --  94*  --  97*   CO2  --   --  30*  --  29  --  30*   BUN  --   --  51.0*  --  84.9*  --  52.0*   CR  --   --   2.92*  --  4.22*  --  2.98*   ANIONGAP  --   --  11  --  14  --  12   JODY  --   --  10.4  --  10.0  --  9.9   * 126* 117*   < > 251*   < > 232*    < > = values in this interval not displayed.     Most Recent 3 Troponin's:No lab results found.  Most Recent 3 BNP's:  Recent Labs   Lab Test 02/02/25  0542 01/20/25  0830 01/08/25  0742   NTBNPI 41,365* 49,524* 30,737*     Most Recent Cholesterol Panel:  Recent Labs   Lab Test 04/21/23  1405   CHOL 113   LDL 39   HDL 50   TRIG 122         Assessment & Plan     # Recurrent Atrial Fibrillation with RVR  # Previous bradycardia with pauses 2/2 high dose of rate control agents  # Hypoxic respiratory failure  # Influenza A  # HFpEF  # CKD  # T2DM    Unfortunately went back into atrial fibrillation with RVR earlier this morning.  Agree with reinitiating IV amiodarone.  Oral amiodarone can be continued concurrently.  Would tentatively plan for another 24 hours of IV Amio.  No need to be aggressive in controlling her heart rates given minimal symptoms.  Suspect she will cardiovert on her own within the next 24 to 48 hours.  Given all that she has been through, it is not surprising she may flip back-and-forth into AF.  As she gets further and further from her inciting event and recovers a bit more, ideally these episodes will lessen.  Once she is ready for dismissal, can have further discussions in regards to ongoing rhythm control in the outpatient setting.  If she is to be on amiodarone long-term she will need periodic monitoring of thyroid function, liver function, pulmonary, retina, etc.  Please continue heparin for anticoagulation.  Volume control per nephrology and dialysis    Plan:  -Agree with reinitiating amiodarone drip for at least the next 24 hours  -Please continue oral amiodarone  -Consider checking Mg or giving empirically      Roshan Rodríguez MD  2/15/2025      Medical Decision Making       38 MINUTES SPENT BY ME on the date of service doing chart review,  history, exam, documentation & further activities per the note.

## 2025-02-15 NOTE — PLAN OF CARE
Pt disoriented to time,forgetful.VSS on 2L NC, except HTN systolic in 160's.TELE SR w/ 1st degree AVB.Lung Sounds diminished.Bowel Sounds active, -BM.pt no hemodialysis no urine. Rash under breasts and groin, blanchable redness to coccyx. Assist 2 with lift frequent turn and repo.PRN tylenol given X1 for headache. NPO tube feeds, NG clamped. L fistula. R PIV SL. Pt converted to afib RVR -170's ~ 1300 pt complained of chest pain RRT called see note for details. Amio bolus started.

## 2025-02-15 NOTE — PLAN OF CARE
Pt disoriented to time and place, forgetful.VSS on 2L NC, except HTN systolic in 160's..TELE SR w/ ST depression.Lung Sounds diminished.Bowel Sounds active, -BM.pt no hemodialysis no urine. Rash under breasts and groin, blanchable redness to coccyx. Assist 2 with lift frequent turn and repo.Pt denies pain. NPO tube feeds at 65ml/hr in NG. L fistula. R CHANTAL VILLALTA.

## 2025-02-16 LAB
ANION GAP SERPL CALCULATED.3IONS-SCNC: 14 MMOL/L (ref 7–15)
APTT PPP: 48 SECONDS (ref 22–38)
APTT PPP: 68 SECONDS (ref 22–38)
APTT PPP: 88 SECONDS (ref 22–38)
BUN SERPL-MCNC: 73.1 MG/DL (ref 8–23)
CALCIUM SERPL-MCNC: 10 MG/DL (ref 8.8–10.4)
CHLORIDE SERPL-SCNC: 94 MMOL/L (ref 98–107)
CREAT SERPL-MCNC: 3.89 MG/DL (ref 0.51–0.95)
EGFRCR SERPLBLD CKD-EPI 2021: 11 ML/MIN/1.73M2
ERYTHROCYTE [DISTWIDTH] IN BLOOD BY AUTOMATED COUNT: 16.1 % (ref 10–15)
GLUCOSE BLDC GLUCOMTR-MCNC: 149 MG/DL (ref 70–99)
GLUCOSE BLDC GLUCOMTR-MCNC: 172 MG/DL (ref 70–99)
GLUCOSE BLDC GLUCOMTR-MCNC: 175 MG/DL (ref 70–99)
GLUCOSE BLDC GLUCOMTR-MCNC: 177 MG/DL (ref 70–99)
GLUCOSE BLDC GLUCOMTR-MCNC: 204 MG/DL (ref 70–99)
GLUCOSE BLDC GLUCOMTR-MCNC: 221 MG/DL (ref 70–99)
GLUCOSE BLDC GLUCOMTR-MCNC: 238 MG/DL (ref 70–99)
GLUCOSE BLDC GLUCOMTR-MCNC: 254 MG/DL (ref 70–99)
GLUCOSE SERPL-MCNC: 252 MG/DL (ref 70–99)
HCO3 SERPL-SCNC: 25 MMOL/L (ref 22–29)
HCT VFR BLD AUTO: 26.3 % (ref 35–47)
HGB BLD-MCNC: 8 G/DL (ref 11.7–15.7)
MAGNESIUM SERPL-MCNC: 2.1 MG/DL (ref 1.7–2.3)
MCH RBC QN AUTO: 30.1 PG (ref 26.5–33)
MCHC RBC AUTO-ENTMCNC: 30.4 G/DL (ref 31.5–36.5)
MCV RBC AUTO: 99 FL (ref 78–100)
PLATELET # BLD AUTO: 215 10E3/UL (ref 150–450)
POTASSIUM SERPL-SCNC: 3.7 MMOL/L (ref 3.4–5.3)
RBC # BLD AUTO: 2.66 10E6/UL (ref 3.8–5.2)
SODIUM SERPL-SCNC: 133 MMOL/L (ref 135–145)
WBC # BLD AUTO: 4.9 10E3/UL (ref 4–11)

## 2025-02-16 PROCEDURE — 85018 HEMOGLOBIN: CPT | Performed by: INTERNAL MEDICINE

## 2025-02-16 PROCEDURE — 36415 COLL VENOUS BLD VENIPUNCTURE: CPT | Performed by: HOSPITALIST

## 2025-02-16 PROCEDURE — 250N000013 HC RX MED GY IP 250 OP 250 PS 637: Performed by: HOSPITALIST

## 2025-02-16 PROCEDURE — 94640 AIRWAY INHALATION TREATMENT: CPT | Mod: 76

## 2025-02-16 PROCEDURE — 250N000013 HC RX MED GY IP 250 OP 250 PS 637: Performed by: INTERNAL MEDICINE

## 2025-02-16 PROCEDURE — 120N000013 HC R&B IMCU

## 2025-02-16 PROCEDURE — 83735 ASSAY OF MAGNESIUM: CPT | Performed by: HOSPITALIST

## 2025-02-16 PROCEDURE — 36415 COLL VENOUS BLD VENIPUNCTURE: CPT | Performed by: INTERNAL MEDICINE

## 2025-02-16 PROCEDURE — 250N000009 HC RX 250: Performed by: HOSPITALIST

## 2025-02-16 PROCEDURE — 93010 ELECTROCARDIOGRAM REPORT: CPT | Performed by: INTERNAL MEDICINE

## 2025-02-16 PROCEDURE — 999N000157 HC STATISTIC RCP TIME EA 10 MIN

## 2025-02-16 PROCEDURE — 94640 AIRWAY INHALATION TREATMENT: CPT

## 2025-02-16 PROCEDURE — 99418 PROLNG IP/OBS E/M EA 15 MIN: CPT | Performed by: HOSPITALIST

## 2025-02-16 PROCEDURE — 250N000011 HC RX IP 250 OP 636: Performed by: INTERNAL MEDICINE

## 2025-02-16 PROCEDURE — 250N000011 HC RX IP 250 OP 636: Performed by: HOSPITALIST

## 2025-02-16 PROCEDURE — 250N000011 HC RX IP 250 OP 636: Performed by: NURSE PRACTITIONER

## 2025-02-16 PROCEDURE — 85730 THROMBOPLASTIN TIME PARTIAL: CPT | Performed by: HOSPITALIST

## 2025-02-16 PROCEDURE — 93005 ELECTROCARDIOGRAM TRACING: CPT

## 2025-02-16 PROCEDURE — 99233 SBSQ HOSP IP/OBS HIGH 50: CPT | Performed by: HOSPITALIST

## 2025-02-16 PROCEDURE — 85730 THROMBOPLASTIN TIME PARTIAL: CPT | Performed by: INTERNAL MEDICINE

## 2025-02-16 PROCEDURE — 80048 BASIC METABOLIC PNL TOTAL CA: CPT | Performed by: HOSPITALIST

## 2025-02-16 PROCEDURE — 250N000013 HC RX MED GY IP 250 OP 250 PS 637: Performed by: PHYSICIAN ASSISTANT

## 2025-02-16 PROCEDURE — 99232 SBSQ HOSP IP/OBS MODERATE 35: CPT | Performed by: INTERNAL MEDICINE

## 2025-02-16 RX ORDER — HYDRALAZINE HYDROCHLORIDE 20 MG/ML
10 INJECTION INTRAMUSCULAR; INTRAVENOUS EVERY 6 HOURS PRN
Status: DISCONTINUED | OUTPATIENT
Start: 2025-02-16 | End: 2025-02-26 | Stop reason: HOSPADM

## 2025-02-16 RX ORDER — GUAIFENESIN/DEXTROMETHORPHAN 100-10MG/5
10 SYRUP ORAL EVERY 4 HOURS PRN
Status: DISCONTINUED | OUTPATIENT
Start: 2025-02-16 | End: 2025-02-26 | Stop reason: HOSPADM

## 2025-02-16 RX ORDER — HYDRALAZINE HYDROCHLORIDE 50 MG/1
50 TABLET, FILM COATED ORAL EVERY 6 HOURS
Status: DISCONTINUED | OUTPATIENT
Start: 2025-02-16 | End: 2025-02-16

## 2025-02-16 RX ADMIN — CETIRIZINE HYDROCHLORIDE 5 MG: 5 TABLET ORAL at 21:42

## 2025-02-16 RX ADMIN — LEVALBUTEROL HYDROCHLORIDE 1.25 MG: 1.25 SOLUTION RESPIRATORY (INHALATION) at 19:05

## 2025-02-16 RX ADMIN — TACROLIMUS: 1 OINTMENT TOPICAL at 21:47

## 2025-02-16 RX ADMIN — BUDESONIDE 1 MG: 1 INHALANT ORAL at 07:49

## 2025-02-16 RX ADMIN — HYDRALAZINE HYDROCHLORIDE 50 MG: 50 TABLET ORAL at 14:51

## 2025-02-16 RX ADMIN — AMIODARONE HYDROCHLORIDE 200 MG: 200 TABLET ORAL at 09:24

## 2025-02-16 RX ADMIN — Medication 50 MCG: at 09:24

## 2025-02-16 RX ADMIN — GUAIFENESIN AND DEXTROMETHORPHAN 10 ML: 100; 10 SYRUP ORAL at 09:24

## 2025-02-16 RX ADMIN — MICONAZOLE NITRATE: 2 POWDER TOPICAL at 21:48

## 2025-02-16 RX ADMIN — BUDESONIDE 1 MG: 1 INHALANT ORAL at 19:05

## 2025-02-16 RX ADMIN — SERTRALINE HYDROCHLORIDE 75 MG: 50 TABLET ORAL at 21:41

## 2025-02-16 RX ADMIN — HYDRALAZINE HYDROCHLORIDE 5 MG: 20 INJECTION INTRAMUSCULAR; INTRAVENOUS at 17:46

## 2025-02-16 RX ADMIN — GUAIFENESIN AND DEXTROMETHORPHAN 10 ML: 100; 10 SYRUP ORAL at 16:18

## 2025-02-16 RX ADMIN — IPRATROPIUM BROMIDE 0.5 MG: 0.5 SOLUTION RESPIRATORY (INHALATION) at 07:49

## 2025-02-16 RX ADMIN — TACROLIMUS: 1 OINTMENT TOPICAL at 13:34

## 2025-02-16 RX ADMIN — IPRATROPIUM BROMIDE 0.5 MG: 0.5 SOLUTION RESPIRATORY (INHALATION) at 19:05

## 2025-02-16 RX ADMIN — LEVALBUTEROL HYDROCHLORIDE 1.25 MG: 1.25 SOLUTION RESPIRATORY (INHALATION) at 07:49

## 2025-02-16 RX ADMIN — QUETIAPINE FUMARATE 50 MG: 50 TABLET ORAL at 21:42

## 2025-02-16 RX ADMIN — HYDRALAZINE HYDROCHLORIDE 50 MG: 50 TABLET ORAL at 05:27

## 2025-02-16 RX ADMIN — IPRATROPIUM BROMIDE 0.5 MG: 0.5 SOLUTION RESPIRATORY (INHALATION) at 11:13

## 2025-02-16 RX ADMIN — INSULIN ASPART 1 UNITS: 100 INJECTION, SOLUTION INTRAVENOUS; SUBCUTANEOUS at 05:36

## 2025-02-16 RX ADMIN — INSULIN GLARGINE 15 UNITS: 100 INJECTION, SOLUTION SUBCUTANEOUS at 21:48

## 2025-02-16 RX ADMIN — IPRATROPIUM BROMIDE 0.5 MG: 0.5 SOLUTION RESPIRATORY (INHALATION) at 14:58

## 2025-02-16 RX ADMIN — AMLODIPINE BESYLATE 5 MG: 5 TABLET ORAL at 21:46

## 2025-02-16 RX ADMIN — MICONAZOLE NITRATE: 2 POWDER TOPICAL at 13:37

## 2025-02-16 RX ADMIN — INSULIN GLARGINE 15 UNITS: 100 INJECTION, SOLUTION SUBCUTANEOUS at 09:41

## 2025-02-16 RX ADMIN — Medication 40 MG: at 09:22

## 2025-02-16 RX ADMIN — INSULIN ASPART 1 UNITS: 100 INJECTION, SOLUTION INTRAVENOUS; SUBCUTANEOUS at 01:14

## 2025-02-16 RX ADMIN — AMIODARONE HYDROCHLORIDE 0.5 MG/MIN: 1.8 INJECTION, SOLUTION INTRAVENOUS at 09:15

## 2025-02-16 RX ADMIN — LEVALBUTEROL HYDROCHLORIDE 1.25 MG: 1.25 SOLUTION RESPIRATORY (INHALATION) at 11:13

## 2025-02-16 RX ADMIN — ATORVASTATIN CALCIUM 20 MG: 20 TABLET, FILM COATED ORAL at 21:42

## 2025-02-16 RX ADMIN — CARBAMIDE PEROXIDE 6.5% 5 DROP: 6.5 LIQUID AURICULAR (OTIC) at 14:56

## 2025-02-16 RX ADMIN — Medication 60 ML: at 14:55

## 2025-02-16 RX ADMIN — HEPARIN SODIUM 1100 UNITS/HR: 10000 INJECTION, SOLUTION INTRAVENOUS at 08:56

## 2025-02-16 RX ADMIN — FUROSEMIDE 80 MG: 40 TABLET ORAL at 09:24

## 2025-02-16 RX ADMIN — HYDRALAZINE HYDROCHLORIDE 75 MG: 50 TABLET ORAL at 21:42

## 2025-02-16 RX ADMIN — LEVALBUTEROL HYDROCHLORIDE 1.25 MG: 1.25 SOLUTION RESPIRATORY (INHALATION) at 14:58

## 2025-02-16 RX ADMIN — AMIODARONE HYDROCHLORIDE 200 MG: 200 TABLET ORAL at 21:43

## 2025-02-16 RX ADMIN — Medication 5 ML: at 09:22

## 2025-02-16 RX ADMIN — AMLODIPINE BESYLATE 5 MG: 5 TABLET ORAL at 09:24

## 2025-02-16 ASSESSMENT — ACTIVITIES OF DAILY LIVING (ADL)
ADLS_ACUITY_SCORE: 84
ADLS_ACUITY_SCORE: 84
ADLS_ACUITY_SCORE: 83
ADLS_ACUITY_SCORE: 84
ADLS_ACUITY_SCORE: 84
ADLS_ACUITY_SCORE: 83
ADLS_ACUITY_SCORE: 84
ADLS_ACUITY_SCORE: 83
ADLS_ACUITY_SCORE: 84
ADLS_ACUITY_SCORE: 83
ADLS_ACUITY_SCORE: 84
ADLS_ACUITY_SCORE: 83
ADLS_ACUITY_SCORE: 84

## 2025-02-16 NOTE — PROGRESS NOTES
[facility] Cardiology Progress Note      Subjective     75-year-old woman with a prolonged hospitalization in the setting of respiratory failure and influenza, course complicated by paroxysmal atrial fibrillation with RVR and at times bradycardia while on high doses of candida agents. Cardiology had last seen on 2/11 after another episode of AF with RVR. She had been maintaining sinus rhythm until 2/15 when atrial fibrillation recurred. Reconsulted for ongoing management.     Shortly after initiation of IV amiodarone yesterday she converted back to normal sinus rhythm and has since been maintaining sinus.  Otherwise hemodynamically stable and without issues.      Objective     VITAL SIGNS  Temp:  [97.9  F (36.6  C)-98.7  F (37.1  C)] 98.5  F (36.9  C)  Pulse:  [] 71  Resp:  [18] 18  BP: ()/(42-99) 157/49  SpO2:  [94 %-100 %] 98 %  Admission Weight: 119 kg (262 lb 5.6 oz)  Current Weight: 97.3 kg (214 lb 8.1 oz)    PHYSICAL EXAMINATION  General:  Well-developed, well-nourished. No acute distress.   Cardiovascular:  Regular rate and rhythm. Normal S1 & S2.   Lungs:  Normal work of breathing.  Clear anteriorly  Abdomen:  Soft, nontender  Extremities:  Warm, well perfused. 2+ radial pulses bilaterally.  No significant edema.   Neuro: Moving all extremities, no gross deficits noted      DIAGNOSTICS    Most Recent 3 CBC's:  Recent Labs   Lab Test 02/16/25  0529 02/15/25  0842 02/14/25  1421   WBC 4.9 4.0 4.4   HGB 8.0* 8.3* 8.4*   MCV 99 99 98    199 184     Most Recent 3 BMP's:  Recent Labs   Lab Test 02/16/25  0728 02/16/25  0529 02/15/25  0919 02/15/25  0842 02/14/25  0821 02/14/25  0633   NA  --  133*  --  139  --  137   POTASSIUM  --  3.7  --  4.0  --  3.7   CHLORIDE  --  94*  --  98  --  94*   CO2  --  25  --  30*  --  29   BUN  --  73.1*  --  51.0*  --  84.9*   CR  --  3.89*  --  2.92*  --  4.22*   ANIONGAP  --  14  --  11  --  14   JODY  --  10.0  --   10.4  --  10.0   * 221*  252*   < > 117*   < > 251*    < > = values in this interval not displayed.     Most Recent 3 Troponin's:No lab results found.  Most Recent 3 BNP's:  Recent Labs   Lab Test 02/02/25  0542 01/20/25  0830 01/08/25  0742   NTBNPI 41,365* 49,524* 30,737*     Most Recent Cholesterol Panel:  Recent Labs   Lab Test 04/21/23  1405   CHOL 113   LDL 39   HDL 50   TRIG 122         Assessment & Plan     # Recurrent Atrial Fibrillation with RVR, now back in sinus  # Previous bradycardia with pauses 2/2 high dose of rate control agents  # Hypoxic respiratory failure  # Influenza A  # HFpEF  # CKD  # T2DM    Converted back to sinus rhythm shortly after initiation of IV amiodarone yesterday.  Fine to complete the IV load.  Continue with oral amiodarone as previously recommended.  Given her overall status, not unexpected that she may flip back-and-forth between sinus and atrial fibrillation at times.  She has not been particularly symptomatic when in AF, there is no need to be overly aggressive.  Again, as she gets further from her infection and inciting event episodes should lessen.  Ongoing discussions in regards to long-term rhythm control can be considered in the outpatient setting.  If she is to remain on amiodarone long-term she will need periodic thyroid, liver, lung, retina monitoring.  Continue heparin for anticoagulation.  Volume control per nephrology dialysis.  We will sign off, please call with any further questions.    Plan:  -Finish IV amiodarone load  -Continue oral amiodarone  -Zio monitor on discharge with EP follow-up as previously recommended      Roshan Rodríguez MD  2/16/2025      Medical Decision Making       37 MINUTES SPENT BY ME on the date of service doing chart review, history, exam, documentation & further activities per the note.

## 2025-02-16 NOTE — PLAN OF CARE
"Patient Name: Geronimo  MRN: [id number]  Date of Admission: 1/8/2025  Reason for Admission: SOB, influenza, RF  [place] [facility] [place]    [id number]    Vitals:   BP Readings from Last 1 Encounters:   02/16/25 (!) 140/42     Pulse Readings from Last 1 Encounters:   02/16/25 68     Wt Readings from Last 1 Encounters:   02/09/25 97.3 kg (214 lb 8.1 oz)     Ht Readings from Last 1 Encounters:   02/04/25 1.702 m (5' 7.01\")     Estimated body mass index is 33.59 kg/m  as calculated from the following:    Height as of this encounter: 1.702 m (5' 7.01\").    Weight as of this encounter: 97.3 kg (214 lb 8.1 oz).  Temp Readings from Last 1 Encounters:   02/15/25 98.1  F (36.7  C) (Oral)       Pain: denied pain    Assessment    Resp: LS clear/dim, on 1L NC. Infrequent, congested, productive cough.   Telemetry: SR  Neuro: Alert, forgetful of situation, time.   GI/: +BS, +flatus, -BM this shift. Anuric, pt on dialysis.   Skin/Wounds: Blanchable rednesss to coccyx, mepi in place. Rash under breasts and groin, miconazole applied. AV fistula L arm. L AKA.   Lines/Drains: NG tube 95 cm @ nare, TF running 65ml/hr till 0600, q4 30ml FWF. R hand PIV infusing heparin @ 1100 units/hr. R FA PIV infusing amio gtt @ 0.5mg/min.   Activity: A2 turn/repo. Pt weight shifting. Refusing repositioning at times, education provided.   Sleep: 5-6 hours between cares  Abnormal Labs: Dialysis pt. Hgb 8.0, Na 133, Cr 3.89,     Aggression Stop Light: Green          Patient Care Plan: Heparin PTT recheck @ 0931. Q4h BG checks. Encourage pulm hygiene  "

## 2025-02-16 NOTE — PLAN OF CARE
"Goal Outcome Evaluation:       Patient Name: Supriya Herr  MRN: [id number]  Date of Admission: 1/8/2025  Reason for Admission:     Acute and chronic respiratory failure with hypoxia (H)  Level of Care: [facility] [place]    Vitals:   BP Readings from Last 1 Encounters:   02/16/25 (!) 175/55     Pulse Readings from Last 1 Encounters:   02/16/25 79     Wt Readings from Last 1 Encounters:   02/09/25 97.3 kg (214 lb 8.1 oz)     Ht Readings from Last 1 Encounters:   02/04/25 1.702 m (5' 7.01\")     Estimated body mass index is 33.59 kg/m  as calculated from the following:    Height as of this encounter: 1.702 m (5' 7.01\").    Weight as of this encounter: 97.3 kg (214 lb 8.1 oz).  Temp Readings from Last 1 Encounters:   02/16/25 97.9  F (36.6  C) (Oral)       Pain: Pain goal 0 Pain Rating 0 Effective pain medication/regimen     CV Surgery Patient: No    Assessment    Resp: LS fine crackles on 1 L NC, productive/loose cough, using suction for secretions.  Telemetry: NSR  Neuro: A&O x3 with confusion, anxious at times. Redirect pt when needed.  GI/: BS normoactive, one BM during shift on bedpan. Anuria due to hemodialysis. NPO.  Skin/Wounds: Blanchable redness on coccyx, rash under breasts, treated with cream  Lines/Drains: NG tube at 95 cm, tube feed from 8779-0338 at 65 ml/hr., two PIV's in the right arm infusing with Heparin gtt at 1250 units/hr, amio gtt at 33.3 ml/hr    Activity: Assist of 2 w/lift, T&R  Sleep: Adequate  Abnormal Labs: N/A    Aggression Stop Light: Green    Patient Care Plan: Continue with plan of care. Dialysis on Monday. Gtube placement on Monday. Hold heparin at 2 am  then discharge to TCU when medically ready.                 "

## 2025-02-16 NOTE — PROGRESS NOTES
[facility]    Medicine Progress Note - Hospitalist Service    Date of Admission:  1/8/2025    Assessment & Plan   Supriya Herr is a 74 year old female with PMHx significant for ESRD on HD, CHF, COPD, poor mobility (Lt AKA, Obesity, WC bound), DM type II, hypertension.  She was brought to the ER by EMS for evaluation of shortness of breath, diagnosed with influenza A and admitted on 1/8/25       Hospital course summary  Patient was admitted, subsequently intubated for worsening respiratory failure and remained on mechanical ventilator 1/9 - 1/18.  She was reintubated and remained on mechanical ventilator 1/20 - 1/25.  Was treated with Tamiflu, IV antibiotic, steroid, has completed courses.  Hospital course complicated by A-fib RVR.  Was on amiodarone drip with eventual transition to p.o. Also required diltiazem  for rate control.  Required BiPAP--HFNC--intermittent BiPAP use.  Ongoing tube feeding and modified diet per SLP.  Delirium managed with Seroquel.  Concern for recurrent pneumonia, hospital-acquired on 2/1, restarted on antibiotics.  ID consulted, recommended discontinuing antibiotics, respiratory failure likely related to fluid overload.     Patient developed symptomatic bradycardia on 2/5/25 triggering a RRT. She was placed on dopamine drip and transferred back to the ICU. Bradycardia was likely due to rate controlling medications. Cardiology consulted with adjustment of medications. Patient has remained in NSR without recurrence of bradycardia on amio drip. While in the ICU, patient required dialysis on pressors, now transitioned to midodrine . Hypoxic and hypercarbic resp failure is much improved with dialysis.      Patient transferred back to hospitalist service on 2/8/25. Recurrent issues with atrial fib with RVR.     Acute hypoxic respiratory failure-improving  Influenza A pneumonia- resolved  Acute COPD exacerbation- resolved  Hospital-acquired pneumonia  Pharyngeal  edema  Initial presentation with shortness of breath, diagnosed influenza A.  *1/20 bronchoscopy and BAL: culture grew actinomyces  *CT chest showed RML infiltrate versus atelectasis but no sign of actinomycosis, intensivist felt this was likely colonization.    *Pneumonia treated with cefepime and vancomycin, then Levaquin. Completed course 1/26.  *Flu and COPD exacerbation was treated with IV steroids and tamiflu.  *2/1 had increased O2 needs, concern for new infection. repeat blood cx --negative. CT chest showed possible right lower lobe pneumonia and ID was consulted and patient was again started on vancomycin and Levaquin, then subsequently discontinued as respiratory failure was determined to be due to fluid overload as she improved with dialysis.  - Continue budesonide 1 Mg twice daily, ipratropium/xopenex 4 times daily  - weaning off O2, only on 1LPM at bedtime.     Recurrent atrial fibrillation with RVR now in normal sinus rhythm  Acute on chronic CHFpEF  Hypertension  Shock, due to sedation-resolved  Symptomatic bradycardia-Resolved  PTA amlodipine 5 Mg BID, Coreg 25 Mg BID and Lasix.  *1/21/25 TTE: LVEF 60%, no pericardial effusion, no significant valvular disease, diastolic function indeterminate.  No WMA.  *2/2/25 had symptomatic bradycardia due to rate controlling medications requiring RRT. Placed on dopamine gtt, coreg and diltiazem were held. Her heart went back into normal sinus and only amiodarone 200mg daily was continued at that point.  *2/10/25: developed atrial fib with RVR, amiodarone gtt restarted and PO dose increased from daily to BID.  *2/12/25: restarted amlodipine and oral hydralazine for uptrending BPs  *2/15/25: recurrent atrial fib with RVR, RRT called, amiodarone gtt restarted.  -Cardiology has followed on/off due to recurrent atrial fib and bradycardia.   - Intermittent needs for amiodarone gtt, currently on amiodarone oral 200mg BID.  - She may need to have pacemaker if these  issues with afib with RVR keep happening  - Continue with atorvastatin 20 mg daily and Lasix 80 mg daily (hold on dialysis days)  - Eliquis 5mg BID, on hold for g-tube  - amlodipine 5mg BID, hydralazine 75mg q8h     Encephalopathy likely infectious and metabolic-improved  Anxiety  *1/20/25 CT head: negative for acute intracranial process. Volume loss and chronic microvascular ischemic changes noted. Suspected some baseline cognitive impairment.    *2/10/25: CT head: no acute intracranial process.   *2/10/25 EEG: showed moderate generalized slowing and triphasic waves which commonly seen in chronic kidney disease.   -Of note has been having jerking movements in the hospital  -Neurology evaluated and recommended vitamins  - since 2/11/25: AOx2-3, fluctuating mental status  -Continue Seroquel 50mg qhs  -Continue PTA Zoloft and gabapentin.  -PT, OT recommend TCU     Diabetes mellitus, type 2  HbA1c 6.5% on 1/17/25. PTA Lantus 18u qd and NovoLog 5u TIDWM  -Lantus 15 units BID  -medium resistance sliding scale insulin q4h  -Hypoglycemia protocol     ESRD on HD , wasTue-Thu-Sat, now MWF  Anemia of chronic disease  Hyperkalemia-resolved  -Nephrology following, currently MWF dialysis  -Hemoglobin 7-8, at baseline.  -Gets EPO with hemodialysis.  -Continue vitamin D3.    -hold Renvela while npo as it can't be crushed  -Nephrology resumed Lasix on nondialysis days on 2/3.    - continue midodrine daily MWF with dialysis  -Lokelma discontinued on 2/10     Constipation  -Noted on CT 2/2  -Continue the bowel regimen      Dysphagia  Suspect due to recurrent intubation.  -Dietitian consulted.  -Continue tube feedings for now.  -Did well with video swallow study on 1/31/25, diet advanced.  But again was made n.p.o. on 2/11  -continue cycling tube feeds.   * Discussed with IR: held apixaban in anticipation for G-tube on Monday 2/17.  Started on heparin gtt, hold at 2am on 2/17  -ENT evaluated 2/16, reports mild edema of arytenoids. No  "candida infection of pharynx of larynx     Likely cerumen impaction- resolved  2/11 reported ear pressure and some ringing  - improved with debrox drops added 2/12, finished course 2/16. Ears clear when evaluated by ENT on 2/16        Diet: Room Service  Adult Formula Drip Feeding: Continuous [place] [employer] Renal Support; Nasoduodenal tube; Goal Rate: 65; mL/hr; From: 4:00 PM; To: 6:00 AM; Change formula. Restart at new goal, run x14 hours nightly for total volume 910 mL  NPO per Anesthesia Guidelines for Procedure/Surgery Except for: Meds, Ice Chips    DVT Prophylaxis: heparin gtt  Bradshaw Catheter: Not present  Lines: None     Cardiac Monitoring: ACTIVE order. Indication: Tachyarrhythmias, acute (48 hours)  Code Status: Full Code      Clinically Significant Risk Factors         # Hyponatremia: Lowest Na = 133 mmol/L in last 2 days, will monitor as appropriate  # Hypochloremia: Lowest Cl = 94 mmol/L in last 2 days, will monitor as appropriate      # Hypoalbuminemia: Lowest albumin = 2.8 g/dL at 1/13/2025  5:14 AM, will monitor as appropriate    # Coagulation Defect: INR = 1.57 (Ref range: 0.85 - 1.15) and/or PTT = 48 Seconds (Ref range: 22 - 38 Seconds), will monitor for bleeding    # Hypertension: Noted on problem list  # Chronic heart failure with preserved ejection fraction: heart failure noted on problem list and last echo with EF >50%    # Acute Hypercapnic Respiratory Failure: based on venous blood gas results.  Continue supplemental oxygen and ventilatory support as indicated.        # DMII: A1C = 6.5 % (Ref range: <5.7 %) within past 6 months   # Obesity: Estimated body mass index is 33.59 kg/m  as calculated from the following:    Height as of this encounter: 1.702 m (5' 7.01\").    Weight as of this encounter: 97.3 kg (214 lb 8.1 oz).        # Financial/Environmental Concerns: none         Social Drivers of Health    Tobacco Use: Medium Risk (9/11/2024)    Patient History     Smoking Tobacco Use: Former     " Smokeless Tobacco Use: Never   Physical Activity: Insufficiently Active (7/19/2024)    Exercise Vital Sign     Days of Exercise per Week: 1 day     Minutes of Exercise per Session: 10 min   Social Connections: Unknown (7/19/2024)    Social Connection and Isolation Panel [NHANES]     Frequency of Social Gatherings with Friends and Family: More than three times a week          Disposition Plan     Medically Ready for Discharge: Anticipated in 5+ Days  Await stability after a few days of dialysis, g tube placement given her recurrent episodes of atrial fib with RVR around dialysis times.           Carly Faye,   Hospitalist Service  [facility]  Securely message with [employer] (more info)  Text page via [employer] Paging/Directory   ______________________________________________________________________    Interval History   Patient seen and examined. She is doing ok. Heart rate back in sinus rhythm 2/15 evening. Still sinus this morning. Feels tired. Wants to rest. Updated daughter Caroline Gray via phone x28 minutes. Explaining upcoming procedure, cardiac plan, SLP plan, eventual discharge.  Spent considerable time reviewing 39 day hospital stay.    Physical Exam   Vital Signs: Temp: 97.8  F (36.6  C) Temp src: Oral BP: (!) 173/57 Pulse: 75   Resp: 18 SpO2: 99 % O2 Device: Nasal cannula Oxygen Delivery: 2 LPM  Weight: 214 lbs 8.12 oz    Constitutional: Awake, alert, cooperative, no apparent distress  Respiratory: Clear to auscultation bilaterally, no crackles or wheezing  Cardiovascular: Regular rate and rhythm, normal S1 and S2, and no murmur noted  GI: Normal bowel sounds, soft, non-distended, non-tender  Skin/Integumen: No rashes, no cyanosis, no edema, left lower extremity AKA  Other:  Feeding tube in place in nare.    Medical Decision Making       70 MINUTES SPENT BY ME on the date of service doing chart review, history, exam, documentation & further activities per the note.      Data     I have  personally reviewed the following data over the past 24 hrs:    4.9  \   8.0 (L)   / 215     133 (L) 94 (L) 73.1 (H) /  254 (H)   3.7 25 3.89 (H) \     INR:  N/A PTT:  48 (H)   D-dimer:  N/A Fibrinogen:  N/A       Imaging results reviewed over the past 24 hrs:   No results found for this or any previous visit (from the past 24 hours).

## 2025-02-16 NOTE — CONSULTS
75-year-old female seen in consultation for an evaluation of her hypopharynx and larynx.  She has had history of acute hypoxic respiratory failure that is improving.  She also had influenza A pneumonia and hospital-acquired pneumonia as well as some pharyngeal edema.  She was intubated for worsening respiratory failure 1/9 through 1/18/2025 she was then reintubated and remained on a ventilator from 1 20-1 25.  She had a swallow study on 131 and her diet was advanced but then she was made n.p.o. on February 11.  She is currently on tube feedings.  She does report some discomfort in her throat and points to the right side.  She is swallowing her saliva adequately.  She is on nasal cannula for oxygen supplementation    Exam showed ears to be clear the tympanic membranes are unremarkable.  The nose showed a feeding tube on the left with wraparound tube on the right side as well.  The oral cavity did not reveal any mucosal lesions.  The oropharynx was unremarkable.  We discussed the risk benefits and alternatives to a possible flexible laryngoscopy and she did agreed.  The nose was anesthetized on the right side with topical Cetacaine and Afrin was applied.  The flexible scope was passed on the right side.  The nasopharynx was clear.  The hypopharynx and larynx revealed some mild edema of the arytenoids right greater than left.  Both vocal cords moved into the midline.  The immediate subglottic trachea was unremarkable.  There is no pooling in the piriform sinuses    Impression: Dysphagia    Plan: There was some very mild edema of the arytenoids and this potentially could be consistent with her previous intubations.  The vocal cords themselves were unremarkable as was the laryngeal introitus.  There was nothing on exam to suggest candidal infection of the hypopharynx and larynx.  There was no pooling in the piriform sinuses.  It may be reasonable at some point to consider reevaluation with speech with a swallow study.   No other specific abnormalities were noted on examination today.  Please call if there are further questions

## 2025-02-16 NOTE — PLAN OF CARE
"Goal Outcome Evaluation:       Patient Name: Supriya Herr  MRN: [id number]  Date of Admission: 1/8/2025  Reason for Admission:     Acute and chronic respiratory failure with hypoxia (H)  Level of [place]: [facility] [place]    Vitals:   BP Readings from Last 1 Encounters:   02/15/25 128/53     Pulse Readings from Last 1 Encounters:   02/15/25 (!) 128     Wt Readings from Last 1 Encounters:   02/09/25 97.3 kg (214 lb 8.1 oz)     Ht Readings from Last 1 Encounters:   02/04/25 1.702 m (5' 7.01\")     Estimated body mass index is 33.59 kg/m  as calculated from the following:    Height as of this encounter: 1.702 m (5' 7.01\").    Weight as of this encounter: 97.3 kg (214 lb 8.1 oz).  Temp Readings from Last 1 Encounters:   02/15/25 97.9  F (36.6  C) (Oral)       Pain: Pain goal 0 Pain Rating 0 Effective pain medication/regimen     CV Surgery Patient: No    Assessment    Resp: LS fine crackles on 1 L NC, productive/loose cough, using suction for secretions.  Telemetry: SR, converted to SB from afib at 1859  Neuro: A&O x3 with confusion, anxious at times. Redirect pt when needed.  GI/: Anuria due to hemodialysis  Skin/Wounds: Blanchable redness on coccyx, rash under breasts, treated with cream  Lines/Drains: NG tube at 95 cm, tube feed from [id number] at 65 ml/hr., two PIV's in the right arm infusing with Heparin gtt at 1300 units/hr, amio gtt at 33.3 ml/hr    Activity: Assist of 2 w/lift  Sleep: Adequate  Abnormal Labs: N/A    Aggression Stop Light: Green  Heparin drip at 1300 units/hr, amio drip at 33.3 ml/hr        Patient Care Plan: Continue with 24 hrs of amio. Pt cardioverted to SB at 1859.                   "

## 2025-02-17 ENCOUNTER — APPOINTMENT (OUTPATIENT)
Dept: PHYSICAL THERAPY | Facility: CLINIC | Age: 75
DRG: 207 | End: 2025-02-17
Attending: INTERNAL MEDICINE
Payer: COMMERCIAL

## 2025-02-17 ENCOUNTER — TELEPHONE (OUTPATIENT)
Dept: ENDOCRINOLOGY | Facility: CLINIC | Age: 75
End: 2025-02-17
Payer: COMMERCIAL

## 2025-02-17 ENCOUNTER — APPOINTMENT (OUTPATIENT)
Dept: SPEECH THERAPY | Facility: CLINIC | Age: 75
DRG: 207 | End: 2025-02-17
Attending: INTERNAL MEDICINE
Payer: COMMERCIAL

## 2025-02-17 ENCOUNTER — APPOINTMENT (OUTPATIENT)
Dept: INTERVENTIONAL RADIOLOGY/VASCULAR | Facility: CLINIC | Age: 75
DRG: 207 | End: 2025-02-17
Attending: INTERNAL MEDICINE
Payer: COMMERCIAL

## 2025-02-17 LAB
ANION GAP SERPL CALCULATED.3IONS-SCNC: 17 MMOL/L (ref 7–15)
APTT PPP: 54 SECONDS (ref 22–38)
BACTERIA BRONCH: NO GROWTH
BACTERIA BRONCH: NO GROWTH
BUN SERPL-MCNC: 96.2 MG/DL (ref 8–23)
CALCIUM SERPL-MCNC: 10.1 MG/DL (ref 8.8–10.4)
CHLORIDE SERPL-SCNC: 88 MMOL/L (ref 98–107)
CREAT SERPL-MCNC: 4.7 MG/DL (ref 0.51–0.95)
EGFRCR SERPLBLD CKD-EPI 2021: 9 ML/MIN/1.73M2
ERYTHROCYTE [DISTWIDTH] IN BLOOD BY AUTOMATED COUNT: 16.2 % (ref 10–15)
GLUCOSE BLDC GLUCOMTR-MCNC: 100 MG/DL (ref 70–99)
GLUCOSE BLDC GLUCOMTR-MCNC: 111 MG/DL (ref 70–99)
GLUCOSE BLDC GLUCOMTR-MCNC: 149 MG/DL (ref 70–99)
GLUCOSE BLDC GLUCOMTR-MCNC: 180 MG/DL (ref 70–99)
GLUCOSE BLDC GLUCOMTR-MCNC: 197 MG/DL (ref 70–99)
GLUCOSE BLDC GLUCOMTR-MCNC: 226 MG/DL (ref 70–99)
GLUCOSE BLDC GLUCOMTR-MCNC: 284 MG/DL (ref 70–99)
GLUCOSE SERPL-MCNC: 231 MG/DL (ref 70–99)
HCO3 SERPL-SCNC: 22 MMOL/L (ref 22–29)
HCT VFR BLD AUTO: 26.1 % (ref 35–47)
HGB BLD-MCNC: 8.3 G/DL (ref 11.7–15.7)
HOLD SPECIMEN: NORMAL
LACTATE SERPL-SCNC: 0.9 MMOL/L (ref 0.7–2)
MAGNESIUM SERPL-MCNC: 2.2 MG/DL (ref 1.7–2.3)
MCH RBC QN AUTO: 31 PG (ref 26.5–33)
MCHC RBC AUTO-ENTMCNC: 31.8 G/DL (ref 31.5–36.5)
MCV RBC AUTO: 97 FL (ref 78–100)
PHOSPHATE SERPL-MCNC: 3.4 MG/DL (ref 2.5–4.5)
PLATELET # BLD AUTO: 212 10E3/UL (ref 150–450)
POTASSIUM SERPL-SCNC: 4.7 MMOL/L (ref 3.4–5.3)
RBC # BLD AUTO: 2.68 10E6/UL (ref 3.8–5.2)
SODIUM SERPL-SCNC: 127 MMOL/L (ref 135–145)
WBC # BLD AUTO: 5.1 10E3/UL (ref 4–11)

## 2025-02-17 PROCEDURE — 250N000009 HC RX 250: Performed by: HOSPITALIST

## 2025-02-17 PROCEDURE — 99233 SBSQ HOSP IP/OBS HIGH 50: CPT | Mod: FS

## 2025-02-17 PROCEDURE — 93005 ELECTROCARDIOGRAM TRACING: CPT

## 2025-02-17 PROCEDURE — 84100 ASSAY OF PHOSPHORUS: CPT | Performed by: HOSPITALIST

## 2025-02-17 PROCEDURE — 258N000003 HC RX IP 258 OP 636: Performed by: INTERNAL MEDICINE

## 2025-02-17 PROCEDURE — 250N000013 HC RX MED GY IP 250 OP 250 PS 637: Performed by: HOSPITALIST

## 2025-02-17 PROCEDURE — 99233 SBSQ HOSP IP/OBS HIGH 50: CPT | Performed by: HOSPITALIST

## 2025-02-17 PROCEDURE — 83735 ASSAY OF MAGNESIUM: CPT | Performed by: HOSPITALIST

## 2025-02-17 PROCEDURE — 120N000013 HC R&B IMCU

## 2025-02-17 PROCEDURE — 0DH63UZ INSERTION OF FEEDING DEVICE INTO STOMACH, PERCUTANEOUS APPROACH: ICD-10-PCS | Performed by: NURSE PRACTITIONER

## 2025-02-17 PROCEDURE — 90935 HEMODIALYSIS ONE EVALUATION: CPT | Performed by: INTERNAL MEDICINE

## 2025-02-17 PROCEDURE — 92526 ORAL FUNCTION THERAPY: CPT | Mod: GN | Performed by: SPEECH-LANGUAGE PATHOLOGIST

## 2025-02-17 PROCEDURE — 94640 AIRWAY INHALATION TREATMENT: CPT

## 2025-02-17 PROCEDURE — 85730 THROMBOPLASTIN TIME PARTIAL: CPT | Performed by: HOSPITALIST

## 2025-02-17 PROCEDURE — 90937 HEMODIALYSIS REPEATED EVAL: CPT

## 2025-02-17 PROCEDURE — 49440 PLACE GASTROSTOMY TUBE PERC: CPT

## 2025-02-17 PROCEDURE — 85027 COMPLETE CBC AUTOMATED: CPT | Performed by: INTERNAL MEDICINE

## 2025-02-17 PROCEDURE — 80048 BASIC METABOLIC PNL TOTAL CA: CPT | Performed by: HOSPITALIST

## 2025-02-17 PROCEDURE — 272N000187 HC ACCESSORY CR11

## 2025-02-17 PROCEDURE — 999N000157 HC STATISTIC RCP TIME EA 10 MIN

## 2025-02-17 PROCEDURE — 97110 THERAPEUTIC EXERCISES: CPT | Mod: GP | Performed by: PHYSICAL THERAPIST

## 2025-02-17 PROCEDURE — 94640 AIRWAY INHALATION TREATMENT: CPT | Mod: 76

## 2025-02-17 PROCEDURE — 250N000013 HC RX MED GY IP 250 OP 250 PS 637: Performed by: INTERNAL MEDICINE

## 2025-02-17 PROCEDURE — C1769 GUIDE WIRE: HCPCS

## 2025-02-17 PROCEDURE — 250N000011 HC RX IP 250 OP 636: Performed by: PHYSICIAN ASSISTANT

## 2025-02-17 PROCEDURE — 83605 ASSAY OF LACTIC ACID: CPT | Performed by: HOSPITALIST

## 2025-02-17 PROCEDURE — 93010 ELECTROCARDIOGRAM REPORT: CPT | Performed by: INTERNAL MEDICINE

## 2025-02-17 PROCEDURE — 250N000011 HC RX IP 250 OP 636: Mod: JZ | Performed by: HOSPITALIST

## 2025-02-17 PROCEDURE — 250N000011 HC RX IP 250 OP 636: Mod: JZ | Performed by: RADIOLOGY

## 2025-02-17 PROCEDURE — 36415 COLL VENOUS BLD VENIPUNCTURE: CPT | Performed by: HOSPITALIST

## 2025-02-17 RX ORDER — HYDROMORPHONE HCL IN WATER/PF 6 MG/30 ML
0.2 PATIENT CONTROLLED ANALGESIA SYRINGE INTRAVENOUS
Status: DISCONTINUED | OUTPATIENT
Start: 2025-02-17 | End: 2025-02-26 | Stop reason: HOSPADM

## 2025-02-17 RX ORDER — FLUMAZENIL 0.1 MG/ML
0.2 INJECTION, SOLUTION INTRAVENOUS
Status: DISCONTINUED | OUTPATIENT
Start: 2025-02-17 | End: 2025-02-17 | Stop reason: HOSPADM

## 2025-02-17 RX ORDER — NALOXONE HYDROCHLORIDE 0.4 MG/ML
0.4 INJECTION, SOLUTION INTRAMUSCULAR; INTRAVENOUS; SUBCUTANEOUS
Status: DISCONTINUED | OUTPATIENT
Start: 2025-02-17 | End: 2025-02-17 | Stop reason: HOSPADM

## 2025-02-17 RX ORDER — HEPARIN SODIUM 10000 [USP'U]/100ML
0-5000 INJECTION, SOLUTION INTRAVENOUS CONTINUOUS
Status: DISCONTINUED | OUTPATIENT
Start: 2025-02-17 | End: 2025-02-18

## 2025-02-17 RX ORDER — NALOXONE HYDROCHLORIDE 0.4 MG/ML
0.2 INJECTION, SOLUTION INTRAMUSCULAR; INTRAVENOUS; SUBCUTANEOUS
Status: DISCONTINUED | OUTPATIENT
Start: 2025-02-17 | End: 2025-02-17 | Stop reason: HOSPADM

## 2025-02-17 RX ORDER — CEFAZOLIN SODIUM 2 G/100ML
2 INJECTION, SOLUTION INTRAVENOUS
Status: COMPLETED | OUTPATIENT
Start: 2025-02-17 | End: 2025-02-17

## 2025-02-17 RX ORDER — FENTANYL CITRATE 50 UG/ML
25-50 INJECTION, SOLUTION INTRAMUSCULAR; INTRAVENOUS EVERY 5 MIN PRN
Status: DISCONTINUED | OUTPATIENT
Start: 2025-02-17 | End: 2025-02-17 | Stop reason: HOSPADM

## 2025-02-17 RX ORDER — HEPARIN SODIUM 10000 [USP'U]/100ML
0-5000 INJECTION, SOLUTION INTRAVENOUS CONTINUOUS
Status: DISCONTINUED | OUTPATIENT
Start: 2025-02-17 | End: 2025-02-17

## 2025-02-17 RX ADMIN — TACROLIMUS: 1 OINTMENT TOPICAL at 21:47

## 2025-02-17 RX ADMIN — FENTANYL CITRATE 50 MCG: 50 INJECTION, SOLUTION INTRAMUSCULAR; INTRAVENOUS at 09:43

## 2025-02-17 RX ADMIN — IPRATROPIUM BROMIDE 0.5 MG: 0.5 SOLUTION RESPIRATORY (INHALATION) at 07:12

## 2025-02-17 RX ADMIN — SODIUM CHLORIDE 500 ML: 9 INJECTION, SOLUTION INTRAVENOUS at 11:00

## 2025-02-17 RX ADMIN — AMLODIPINE BESYLATE 5 MG: 5 TABLET ORAL at 17:27

## 2025-02-17 RX ADMIN — SODIUM CHLORIDE 250 ML: 9 INJECTION, SOLUTION INTRAVENOUS at 11:00

## 2025-02-17 RX ADMIN — IPRATROPIUM BROMIDE 0.5 MG: 0.5 SOLUTION RESPIRATORY (INHALATION) at 18:45

## 2025-02-17 RX ADMIN — LEVALBUTEROL HYDROCHLORIDE 1.25 MG: 1.25 SOLUTION RESPIRATORY (INHALATION) at 07:12

## 2025-02-17 RX ADMIN — Medication 50 MCG: at 08:39

## 2025-02-17 RX ADMIN — METOPROLOL TARTRATE 2.5 MG: 5 INJECTION INTRAVENOUS at 14:32

## 2025-02-17 RX ADMIN — Medication 40 MG: at 08:39

## 2025-02-17 RX ADMIN — HYDROMORPHONE HYDROCHLORIDE 0.2 MG: 0.2 INJECTION, SOLUTION INTRAMUSCULAR; INTRAVENOUS; SUBCUTANEOUS at 14:31

## 2025-02-17 RX ADMIN — QUETIAPINE FUMARATE 50 MG: 50 TABLET ORAL at 21:45

## 2025-02-17 RX ADMIN — Medication: at 11:00

## 2025-02-17 RX ADMIN — SERTRALINE HYDROCHLORIDE 75 MG: 50 TABLET ORAL at 21:46

## 2025-02-17 RX ADMIN — MIDAZOLAM 1 MG: 1 INJECTION INTRAMUSCULAR; INTRAVENOUS at 09:39

## 2025-02-17 RX ADMIN — GLUCAGON 1 MG: KIT at 09:49

## 2025-02-17 RX ADMIN — HYDRALAZINE HYDROCHLORIDE 75 MG: 50 TABLET ORAL at 21:45

## 2025-02-17 RX ADMIN — ACETAMINOPHEN 650 MG: 325 TABLET, FILM COATED ORAL at 17:27

## 2025-02-17 RX ADMIN — MICONAZOLE NITRATE: 2 POWDER TOPICAL at 21:46

## 2025-02-17 RX ADMIN — SODIUM CHLORIDE 200 ML: 9 INJECTION, SOLUTION INTRAVENOUS at 11:00

## 2025-02-17 RX ADMIN — FENTANYL CITRATE 50 MCG: 50 INJECTION, SOLUTION INTRAMUSCULAR; INTRAVENOUS at 09:49

## 2025-02-17 RX ADMIN — SODIUM CHLORIDE 200 ML: 9 INJECTION, SOLUTION INTRAVENOUS at 10:59

## 2025-02-17 RX ADMIN — BUDESONIDE 1 MG: 1 INHALANT ORAL at 07:13

## 2025-02-17 RX ADMIN — LEVALBUTEROL HYDROCHLORIDE 1.25 MG: 1.25 SOLUTION RESPIRATORY (INHALATION) at 18:45

## 2025-02-17 RX ADMIN — CEFAZOLIN SODIUM 2 G: 2 INJECTION, SOLUTION INTRAVENOUS at 09:49

## 2025-02-17 RX ADMIN — CETIRIZINE HYDROCHLORIDE 5 MG: 5 TABLET ORAL at 21:46

## 2025-02-17 RX ADMIN — MIDAZOLAM 1 MG: 1 INJECTION INTRAMUSCULAR; INTRAVENOUS at 09:47

## 2025-02-17 RX ADMIN — LIDOCAINE HYDROCHLORIDE 20 ML: 10; .005 INJECTION, SOLUTION EPIDURAL; INFILTRATION; INTRACAUDAL; PERINEURAL at 10:00

## 2025-02-17 RX ADMIN — SENNOSIDES 10 ML: 8.8 LIQUID ORAL at 21:44

## 2025-02-17 RX ADMIN — AMIODARONE HYDROCHLORIDE 200 MG: 200 TABLET ORAL at 21:45

## 2025-02-17 RX ADMIN — ATORVASTATIN CALCIUM 20 MG: 20 TABLET, FILM COATED ORAL at 21:46

## 2025-02-17 RX ADMIN — MICONAZOLE NITRATE: 2 POWDER TOPICAL at 08:45

## 2025-02-17 RX ADMIN — HEPARIN SODIUM 1250 UNITS/HR: 10000 INJECTION, SOLUTION INTRAVENOUS at 19:35

## 2025-02-17 RX ADMIN — TACROLIMUS: 1 OINTMENT TOPICAL at 08:45

## 2025-02-17 ASSESSMENT — ACTIVITIES OF DAILY LIVING (ADL)
ADLS_ACUITY_SCORE: 82
ADLS_ACUITY_SCORE: 83
ADLS_ACUITY_SCORE: 82
ADLS_ACUITY_SCORE: 83
ADLS_ACUITY_SCORE: 83
ADLS_ACUITY_SCORE: 82
ADLS_ACUITY_SCORE: 82
ADLS_ACUITY_SCORE: 83
ADLS_ACUITY_SCORE: 83
ADLS_ACUITY_SCORE: 82
ADLS_ACUITY_SCORE: 83
ADLS_ACUITY_SCORE: 82
ADLS_ACUITY_SCORE: 83
ADLS_ACUITY_SCORE: 82
ADLS_ACUITY_SCORE: 82
ADLS_ACUITY_SCORE: 83
ADLS_ACUITY_SCORE: 82
ADLS_ACUITY_SCORE: 83
ADLS_ACUITY_SCORE: 82

## 2025-02-17 NOTE — PROVIDER NOTIFICATION
MD Notification    Notified Person: MD    Notified Person Name: Vinod Oswald    Notification Date/Time: 2/16 @2228    Notification Interaction: Vocera page    Purpose of Notification: Notified provider that IV amiodarone 0.5mg/min infusion bag completed along with 24 hours of infusion, no order in place to stop or discontinue infusion    Orders Received: Discontinued IV amiodarone     Comments:

## 2025-02-17 NOTE — IR NOTE
Procedure Note for IR Procedure Dictation  Conscious sedation: 2 mg IVP Versed, 100 mcg IVP fentanyl  Sedation time: 9 minutes  Fluoro time: 1.6 minutes  Total Fluoro Dose: 11.23 mGy (Air Kerma)  Contrast: 20 ml Omni  Local anesthetic: 20 ml 1% lidocaine Epi

## 2025-02-17 NOTE — PROVIDER NOTIFICATION
Dr. Faye paged [phone]: EKG confirms, pt back in afib. HR 90s-110s. /75. pt asymptomatic.    Dr. Faye paged [phone]: She is in HD. Gtube placed this morning. I held PO amio d/t HD. Her BPs before going down for gtube placement SBP 170s but that was before sedation and HD starting so unsure if drop from 170s to 100s is alarming.    Addendum 1146: No new orders at this time.     1151: Writer called down to HD and requested that the nurses inform writer if pt starts having symptoms so hospitalist can be notified.

## 2025-02-17 NOTE — PROGRESS NOTES
Potassium   Date Value Ref Range Status   02/17/2025 4.7 3.4 - 5.3 mmol/L Final   02/02/2022 4.7 3.4 - 5.3 mmol/L Final   04/17/2021 4.2 3.4 - 5.3 mmol/L Final     Hemoglobin   Date Value Ref Range Status   02/17/2025 8.3 (L) 11.7 - 15.7 g/dL Final   04/16/2021 10.9 (L) 11.7 - 15.7 g/dL Final     Creatinine   Date Value Ref Range Status   02/17/2025 4.70 (H) 0.51 - 0.95 mg/dL Final   04/17/2021 5.98 (H) 0.52 - 1.04 mg/dL Final     Urea Nitrogen   Date Value Ref Range Status   02/17/2025 96.2 (H) 8.0 - 23.0 mg/dL Final   11/24/2021 37 (H) 7 - 30 mg/dL Final   04/17/2021 68 (H) 7 - 30 mg/dL Final     Sodium   Date Value Ref Range Status   02/17/2025 127 (L) 135 - 145 mmol/L Final   04/17/2021 137 133 - 144 mmol/L Final     INR   Date Value Ref Range Status   02/14/2025 1.57 (H) 0.85 - 1.15 Final   04/16/2021 1.14 0.86 - 1.14 Final       DIALYSIS PROCEDURE NOTE  Hepatitis status of previous patient on machine log was checked and verified ok to use with this patients hepatitis status.    Patient dialyzed for 3.hrs. on a K3 bath with a net fluid removal of  1.5L.  A BFR of 450 ml/min was obtained via a right AVF using 15 gauge needles.      The treatment plan was discussed with Dr. Oneill during the treatment.    Total heparin received during the treatment: 0 units.   Needle cannulation sites held x 10 min.       Meds  given: none   Complications: Atrial fib started during 1st hour of treatment - floor nurse aware- EKG ordered. Dr Oneill notified   Off 30 mins early due to restlessness and increased confusion     Person educated: Patient. Knowledge base substantial. Barriers to learning: none. Educated on procedure via verbal mode. The patient/family verbalized understanding. Pt prefers verbal education style.     ICEBOAT? Timeout performed pre-treatment  I: Patient was identified using 2 identifiers  C:  Consent Signed Yes  E: Equipment preventative maintenance is current and dialysis delivery system OK to use  B:     Latest Reference Range & Units 01/09/25 01:51 02/06/25 10:20   Hep B Surface Agn Nonreactive   Nonreactive   Hepatitis B Surface Antibody Instrument Value <8.5 m[IU]/mL <3.50    Hepatitis B Surface Antibody  Nonreactive      O: Dialysis orders present and complete prior to treatment  A: Vascular access verified and assessed prior to treatment  T: Treatment was performed at a clinically appropriate time  ?: Patient was allowed to ask questions and address concerns prior to treatment  See Adult Hemodialysis flowsheet in [facility] for further details and post assessment.  Machine water alarm in place and functioning. Transducer pods intact and checked every 15min.   Pt returned via stretcher.  Chlorine/Chloramine water system checked every 4 hours.  Outpatient Dialysis at D    Patient repositioned every 2 hours during the treatment.  Post treatment report given to SHANTA Carcamo regarding 1.4L of fluid removed, last BP of 135/67, and patient pain rating of 0/10.     Please remove patient dressing on AVF and AVG needle sites 24 hours after dialysis. If leaking occurs please apply a Band-Aid.

## 2025-02-17 NOTE — PROGRESS NOTES
[facility]    Medicine Progress Note - Hospitalist Service    Date of Admission:  1/8/2025    Assessment & Plan   Supriya Herr is a 74 year old female with PMHx significant for ESRD on HD, CHF, COPD, poor mobility (Lt AKA, Obesity, WC bound), DM type II, hypertension.  She was brought to the ER by EMS for evaluation of shortness of breath, diagnosed with influenza A and admitted on 1/8/25       Hospital course summary  Patient was admitted, subsequently intubated for worsening respiratory failure and remained on mechanical ventilator 1/9 - 1/18.  She was reintubated and remained on mechanical ventilator 1/20 - 1/25.  Was treated with Tamiflu, IV antibiotic, steroid, has completed courses.  Hospital course complicated by A-fib RVR.  Was on amiodarone drip with eventual transition to p.o. Also required diltiazem  for rate control.  Required BiPAP--HFNC--intermittent BiPAP use.  Ongoing tube feeding and modified diet per SLP.  Delirium managed with Seroquel.  Concern for recurrent pneumonia, hospital-acquired on 2/1, restarted on antibiotics.  ID consulted, recommended discontinuing antibiotics, respiratory failure likely related to fluid overload.     Patient developed symptomatic bradycardia on 2/5/25 triggering a RRT. She was placed on dopamine drip and transferred back to the ICU. Bradycardia was likely due to rate controlling medications. Cardiology consulted with adjustment of medications. Patient has remained in NSR without recurrence of bradycardia on amio drip. While in the ICU, patient required dialysis on pressors, now transitioned to midodrine . Hypoxic and hypercarbic resp failure is much improved with dialysis.      Patient transferred back to hospitalist service on 2/8/25. Recurrent issues with atrial fib with RVR.     Acute hypoxic respiratory failure-improving  Influenza A pneumonia- resolved  Acute COPD exacerbation- resolved  Hospital-acquired pneumonia  Pharyngeal  edema  Initial presentation with shortness of breath, diagnosed influenza A.  *1/20 bronchoscopy and BAL: culture grew actinomyces  *CT chest showed RML infiltrate versus atelectasis but no sign of actinomycosis, intensivist felt this was likely colonization.    *Pneumonia treated with cefepime and vancomycin, then Levaquin. Completed course 1/26.  *Flu and COPD exacerbation was treated with IV steroids and tamiflu.  *2/1 had increased O2 needs, concern for new infection. repeat blood cx --negative. CT chest showed possible right lower lobe pneumonia and ID was consulted and patient was again started on vancomycin and Levaquin, then subsequently discontinued as respiratory failure was determined to be due to fluid overload as she improved with dialysis.  - Continue budesonide 1 Mg twice daily, ipratropium/xopenex 4 times daily  - weaning off O2, only on 1-2LPM at bedtime.     Recurrent atrial fibrillation with RVR  Acute on chronic CHFpEF  Hypertension  Shock, due to sedation-resolved  Symptomatic bradycardia-Resolved  PTA amlodipine 5 Mg BID, Coreg 25 Mg BID and Lasix.  *1/21/25 TTE: LVEF 60%, no pericardial effusion, no significant valvular disease, diastolic function indeterminate.  No WMA.  *2/2/25 had symptomatic bradycardia due to rate controlling medications requiring RRT. Placed on dopamine gtt, coreg and diltiazem were held. Her heart went back into normal sinus and only amiodarone 200mg daily was continued at that point.  *2/10/25: developed atrial fib with RVR, amiodarone gtt restarted and PO dose increased from daily to BID.  *2/12/25: restarted amlodipine and oral hydralazine for uptrending BPs  *2/15/25: recurrent atrial fib with RVR, RRT called, amiodarone gtt restarted.  -Cardiology has followed on/off due to recurrent atrial fib and bradycardia.   - Intermittent needs for amiodarone gtt, currently on amiodarone oral 200mg BID.  -plan for PPM on 2/18  - Continue with atorvastatin 20 mg daily and  Lasix 80 mg daily (hold on dialysis days)  - Eliquis 5mg BID, on hold for g-tube initially--now on hold for PPM 2/18   -heparin gtt intermittently (held at 2am 2/18 for PPM)  - amlodipine 5mg BID, hydralazine 75mg q8h     Encephalopathy likely infectious and metabolic-improved  Anxiety  *1/20/25 CT head: negative for acute intracranial process. Volume loss and chronic microvascular ischemic changes noted. Suspected some baseline cognitive impairment.    *2/10/25: CT head: no acute intracranial process.   *2/10/25 EEG: showed moderate generalized slowing and triphasic waves which commonly seen in chronic kidney disease.   -Of note has had jerking movements during her hospital stay  -Neurology evaluated and recommended vitamins  - stable since 2/11/25: AOx2-3, fluctuating mental status  -Continue Seroquel 50mg qhs  -Continue PTA Zoloft and gabapentin.  -PT, OT recommend TCU     Diabetes mellitus, type 2  HbA1c 6.5% on 1/17/25. PTA Lantus 18u qd and NovoLog 5u TIDWM  -Lantus 15 units BID- HELD FOR NPO 2/18  -medium resistance sliding scale insulin q4h  -Hypoglycemia protocol     ESRD on HD , wasTue-Thu-Sat, now MWF  Anemia of chronic disease  Hyperkalemia-resolved  -Nephrology following, currently MWF dialysis  -Hemoglobin 7-8, at baseline.  -Gets EPO with hemodialysis.  -Continue vitamin D3.    -hold Renvela while npo as it can't be crushed  -Nephrology resumed Lasix on nondialysis days on 2/3.    -continue midodrine daily MWF with dialysis  -Lokelma discontinued on 2/10     Constipation  -Noted on CT 2/2  -Continue the bowel regimen      Dysphagia  Suspect due to recurrent intubation. Did well with video swallow study on 1/31/25, diet advanced.  But again was made n.p.o. on 2/11  *2/16 ENT evaluated with flexible scope: mild edema of arytenoids. No candida infection of pharynx of larynx. No findings to explain dysphagia  *2/17 G tube placed with IR  -nutrition following  -tube feeds resumed 4pm 2/17, holding at  "midnight for PPM.     Likely cerumen impaction- resolved  2/11 reported ear pressure and some ringing  - improved with debrox drops added 2/12, finished course 2/16. Ears clear when evaluated by ENT on 2/16        Diet: Room Service  Adult Formula Drip Feeding: Continuous [place] Renal Support; Nasoduodenal tube; Goal Rate: 65; mL/hr; From: 4:00 PM; To: 6:00 AM; Change formula. Restart at new goal, run x14 hours nightly for total volume 910 mL  NPO for Medical/Clinical Reasons Except for: Other; Specify: NPO For oral intake and gastric feedings for 6 hours post insertion Gastrostomy G/GJ tube. May feed through Jejunal or Distal PORT immediately for GJ tubes ONLY.  NPO per Anesthesia Guidelines for Procedure/Surgery Except for: Meds    DVT Prophylaxis: heparin gtt  Bradshaw Catheter: Not present  Lines: None     Cardiac Monitoring: ACTIVE order. Indication: Tachyarrhythmias, acute (48 hours)  Code Status: Full Code      Clinically Significant Risk Factors         # Hyponatremia: Lowest Na = 127 mmol/L in last 2 days, will monitor as appropriate  # Hypochloremia: Lowest Cl = 88 mmol/L in last 2 days, will monitor as appropriate      # Hypoalbuminemia: Lowest albumin = 2.8 g/dL at 1/13/2025  5:14 AM, will monitor as appropriate       # Hypertension: Noted on problem list  # Chronic heart failure with preserved ejection fraction: heart failure noted on problem list and last echo with EF >50%    # Acute Hypercapnic Respiratory Failure: based on venous blood gas results.  Continue supplemental oxygen and ventilatory support as indicated.        # DMII: A1C = 6.5 % (Ref range: <5.7 %) within past 6 months   # Obesity: Estimated body mass index is 33.59 kg/m  as calculated from the following:    Height as of this encounter: 1.702 m (5' 7.01\").    Weight as of this encounter: 97.3 kg (214 lb 8.1 oz).        # Financial/Environmental Concerns: none         Social Drivers of Health    Tobacco Use: Medium Risk (9/11/2024)    " Patient History     Smoking Tobacco Use: Former     Smokeless Tobacco Use: Never   Physical Activity: Insufficiently Active (7/19/2024)    Exercise Vital Sign     Days of Exercise per Week: 1 day     Minutes of Exercise per Session: 10 min   Social Connections: Unknown (7/19/2024)    Social Connection and Isolation Panel [NHANES]     Frequency of Social Gatherings with Friends and Family: More than three times a week          Disposition Plan     Medically Ready for Discharge: Anticipated in 5+ Days  Await PPM, recovery, med adjustment then eventual discharge.           Carly Faye DO  Hospitalist Service  [facility]  Securely message with [employer] (more info)  Text page via [facility] Paging/Directory   ______________________________________________________________________    Interval History   Patient seen and examined. Seen initially in dialysis. She is a little tired, but doing okay. BPs lower in dialysis as she had gotten G tube placed with anesthesia prior to dialysis. She went back into atrial fib during dialysis as well. Seen after return from dialysis-she had some pain initially and was given some IV dilaudid. Daughter at bedside. We discussed plan for possible pacemaker briefly. Discussed with RN.    Physical Exam   Vital Signs: Temp: 98.3  F (36.8  C) Temp src: Oral BP: (!) 135/93 Pulse: (!) 123   Resp: 16 SpO2: 96 % O2 Device: Nasal cannula Oxygen Delivery: 2 LPM  Weight: 214 lbs 8.12 oz    Constitutional: Awake, alert, cooperative, no apparent distress  Respiratory: Clear to auscultation bilaterally, no crackles or wheezing  Cardiovascular: irregular, rate in 120s, normal S1 and S2, and no murmur noted  GI: Normal bowel sounds, soft, non-distended, non-tender. G tube in place left upper abdomen with dressing surrounding.  Skin/Integumen: No rashes, no cyanosis, no edema, left lower extremity AKA  Other:      Medical Decision Making       50 MINUTES SPENT BY ME on the date of  service doing chart review, history, exam, documentation & further activities per the note.      Data     I have personally reviewed the following data over the past 24 hrs:    5.1  \   8.3 (L)   / 212     127 (L) 88 (L) 96.2 (H) /  100 (H)   4.7 22 4.70 (H) \     Procal: N/A CRP: N/A Lactic Acid: 0.9       INR:  N/A PTT:  54 (H)   D-dimer:  N/A Fibrinogen:  N/A       Imaging results reviewed over the past 24 hrs:   Recent Results (from the past 24 hours)   IR Gastrostomy Tube Percutaneous Plcmnt    Narrative    [place] [facility]    EXAM: PERCUTANEOUS GASTROSTOMY TUBE PLACEMENT    LOCATION: [facility]    CLINICAL HISTORY: The patient has a history of poor oral intake and needs long term enteral nutrition.    PROCEDURES PERFORMED:  2. Insufflation of the stomach with air via the nasogastric tube.  3. Percutaneous puncture of body of the stomach under fluoroscopic guidance, and placement of T-tacks.  4. Percutaneous puncture of stomach with 18 gauge needle and advancement of a guidewire through needle and into stomach.  5. Sequential dilation of tract and placement of peel-away sheath into the stomach.  6. Placement of 18 Spanish CARLOS gastrostomy tube through the peel-away sheath and into stomach.    MODERATE SEDATION: 2 mg Versed and 100 mcg Fentanyl were administered intravenously for moderate sedation. Pulse oximetry, heart rate and blood pressure were continuously monitored by an independent trained observer. The physician spent 9 minutes of   face-to-face moderate sedation time with the patient.    ADDITIONAL MEDICATIONS: see EMR    CONTRAST: see EMR    FLUOROSCOPIC TIME: 1.6 minutes  CUMULATIVE AIR KERMA/DOSE: 11.23 mGy    STERILE BARRIER TECHNIQUE: Maximal Sterile Barrier Technique Utilized: Cap AND mask AND sterile gown AND sterile gloves AND sterile full body drape AND hand hygiene AND skin preparation 2% chlorhexidine for cutaneous antisepsis (or acceptable alternative   antiseptics).    Sterile Ultrasound Technique Utilized ?Sterile gel AND sterile probe covers.    UNIVERSAL PROTOCOL: Standard universal protocol per facility guidelines was followed. See EMR for documentation.     TECHNIQUE: Risks, benefits and alternatives were explained to the patient and written, informed consent was obtained. The patient was placed in the supine position on the angiography table. A 5 [place]an catheter was then placed transnasally into the   stomach, under fluoroscopic guidance. The stomach was insufflated with air. The abdomen was prepped and draped in the usual fashion and anesthetized with 1% lidocaine. The body of the stomach was then punctured under fluoroscopic guidance and 2 T-tacks   were deposited within the stomach, and used to place tension on the anterior gastric wall. An 18 gauge needle was then used access the stomach between the T-tacks.  A stiff guidewire was advanced through the needle and looped within the stomach. The   tract was then sequentially dilated and a 22 [place] peel away sheath placed. A 18 [place] CARLOS gastrostomy tube was then advanced over the guidewire and into the stomach. The balloon was inflated with 8 mL of dilute contrast and pulled against the anterior   gastric wall. The T-tacks were then sutured to the skin. Contrast was injected through the gastrostomy tube to document its position. The nasogastric catheter was then removed. The patient tolerated the procedure well and there were no immediate   complications.    FINDINGS: Initial fluoroscopic inspection of the abdomen shows that the colon is caudal to the insufflated stomach. Images obtained following placement of the gastrostomy tube, show that the tube is within the stomach and that the balloon is adequately   inflated.      Impression    IMPRESSION:  1. Fluoroscopic guided placement of 18 [place] CARLOS gastrostomy tube.        CPT Codes:

## 2025-02-17 NOTE — TELEPHONE ENCOUNTER
Contacted pt to let them know Twila is on medical leave and will unable to make their scheduled appt. Pt agreed to 4/2 @ 11 PARRISH Mattson

## 2025-02-17 NOTE — PRE-PROCEDURE
GENERAL PRE-PROCEDURE:   Procedure:  G tube placement  Date/Time:  2/17/2025 8:04 AM    Written consent obtained?: Yes    Risks and benefits: Risks, benefits and alternatives were discussed    Consent given by:  Patient  Patient states understanding of procedure being performed: Yes    Patient's understanding of procedure matches consent: Yes    Procedure consent matches procedure scheduled: Yes    Expected level of sedation:  Moderate  Appropriately NPO:  Yes  ASA Class:  3  Mallampati  :  Grade 3- soft palate visible, posterior pharyngeal wall not visible  Lungs:  Lungs clear with good breath sounds bilaterally  Heart:  Normal heart sounds and rate  History & Physical reviewed:  History and physical reviewed and no updates needed  Statement of review:  I have reviewed the lab findings, diagnostic data, medications, and the plan for sedation    
FAMILY HISTORY:  No pertinent family history in first degree relatives, No known  family h/o CVA/Heart disease or CA

## 2025-02-17 NOTE — IR NOTE
Interventional Radiology Intra-procedural Nursing Note    Patient Name: Supriya Herr  Medical Record Number: [id number]  Today's Date: February 17, 2025    Procedure: Gastrostomy Tube Placement under Moderate Sedation  Start time: 0943  End time: 0954  Report provided to: Dialysis RN      Note: Patient entered Interventional Radiology Suite number 2 via cart. Patient awake, alert and orientated. Assisted onto procedural table in supine position. Prepped and draped.  Dr. Escobedo in room. Time out and procedure started. Vital signs stable. Telemetry reading NSR.    Procedure well tolerated by patient without complications. Procedure end with debrief by Dr. Escobedo.      Administered medication totals:  Lidocaine with Epinephrine 20 mL Intradermal  Versed 2 mg IVP  Fentanyl 100 mcg IVP    Last dose of sedation administered at 0949.

## 2025-02-17 NOTE — PROGRESS NOTES
[facility]  Cardiology Progress Note  Date of Service: 02/17/2025  Primary Cardiologist:  Dr. Khushboo Dickerson    Assessment & Plan    Supriya Herr is a 75 year old female with past medical history significant for ESRD on HD, chronic HFpEF, O2 dependent COPD, L AKA (w/c bound), DM2, HTN and orthostatic hypotension (on midodrine), as well as paroxysmal AFIB admitted on 1/8/2025 with severe respiratory failure from influenza A requiring prolonged intubation. Her course has been complicated by rapid AFIB as well as severe symptomatic bradycardia (reportedly with significant sinus pausing although I do not have those strips for review) resulting in an RRT on 2/5 while on a combination of amiodarone, carvedilol, and diltiazem.     Assessment:  Paroxysmal atrial fibrillation   Amiodarone 200 mg BID  Converted back into atrial fibrillation during dialysis today, rates now 100-120 bpm  [id number] Score 5 (sex, age++, HF, DM)  Eliquis on hold for procedures today  Severe symptomatic bradycardia with reported significant sinus pause  No pauses seen on telemetry over past 24 hours  Hypoxic respiratory failure   Influenza A  HFpEF  Appears euvolemic on exam\  Lasix 80 mg daily   ESRD on HD  Type II diabetes mellitus     Plan:   Converted back into atrial fibrillation today during dialysis, continue amiodarone 200 mg BID.   Will discuss possible pacemaker implantation with EP in order to safely place back on beta-blocker therapy.  NPO at midnight  Cardiology will follow.     Mony Webber CNP  [facility]  Pager: [phone]      Interval History   Converted into atrial fibrillation today while at dialysis. Asymptomatic.    Physical Exam   Temp: 98.3  F (36.8  C) Temp src: Oral BP: 135/57 Pulse: 102   Resp: 16 SpO2: 96 % O2 Device: Nasal cannula Oxygen Delivery: 2 LPM  Vitals:    02/07/25 0615 02/09/25 0600 02/09/25 2300   Weight: 96.1 kg (211 lb 13.8 oz) 91.9 kg (202 lb 9.6 oz) 97.3 kg (214 lb 8.1  "oz)     Older adult female in no apparent distress. Heart rate and rhythm are irregular, no murmur, rub, or gallop. Lung sounds are clear, no rales, rhonchi, or wheezes. Skin warm and dry.   Clinically Significant Risk Factors         # Hyponatremia: Lowest Na = 127 mmol/L in last 2 days, will monitor as appropriate  # Hypochloremia: Lowest Cl = 88 mmol/L in last 2 days, will monitor as appropriate      # Hypoalbuminemia: Lowest albumin = 2.8 g/dL at 1/13/2025  5:14 AM, will monitor as appropriate     # Hypertension: Noted on problem list  # Chronic heart failure with preserved ejection fraction: heart failure noted on problem list and last echo with EF >50%    # Acute Hypercapnic Respiratory Failure: based on venous blood gas results.  Continue supplemental oxygen and ventilatory support as indicated.        # DMII: A1C = 6.5 % (Ref range: <5.7 %) within past 6 months   # Obesity: Estimated body mass index is 33.59 kg/m  as calculated from the following:    Height as of this encounter: 1.702 m (5' 7.01\").    Weight as of this encounter: 97.3 kg (214 lb 8.1 oz).      # Financial/Environmental Concerns: none        Medications   Current Facility-Administered Medications   Medication Dose Route Frequency Provider Last Rate Last Admin    dextrose 10% infusion   Intravenous Continuous PRN Celestina Ross NP        dextrose 10% infusion   Intravenous Continuous PRN Denver Shipman MD        [Held by provider] heparin 25,000 units in 0.45% NaCl 250 mL ANTICOAGULANT infusion  0-5,000 Units/hr Intravenous Continuous Greg Beebe MD   Stopped at 02/17/25 0147    No lozenges or gum should be given while patient on BIPAP/AVAPS/AVAPS AE   Does not apply Continuous PRN Denver Shipman MD        Patient may continue current oral medications   Does not apply Continuous Denver Blackburn MD        sodium chloride 0.9 % infusion   Intravenous Continuous Denver Shipman MD 10 mL/hr at 02/05/25 1831 10 mL/hr at " 02/05/25 1831     Current Facility-Administered Medications   Medication Dose Route Frequency Provider Last Rate Last Admin    - MEDICATION INSTRUCTIONS for Dialysis Patients -   Does not apply See Admin Instructions Braden Montana MD        amiodarone (PACERONE) tablet 200 mg  200 mg Oral BID Carly Faye DO   200 mg at 02/16/25 2143    amLODIPine (NORVASC) tablet 5 mg  5 mg Oral BID Johanne Crowell MD   5 mg at 02/16/25 2146    [Held by provider] apixaban ANTICOAGULANT (ELIQUIS) tablet 5 mg  5 mg Oral or Feeding Tube BID Denver Shipman MD   5 mg at 02/14/25 0809    atorvastatin (LIPITOR) tablet 20 mg  20 mg Oral or Feeding Tube QPM Denver Shipman MD   20 mg at 02/16/25 2142    B and C vitamin Complex with folic acid (NEPHRONEX) liquid 5 mL  5 mL Per Feeding Tube Daily Denver Shipman MD   5 mL at 02/16/25 0922    budesonide (PULMICORT) neb solution 1 mg  1 mg Nebulization BID Denver Shipman MD   1 mg at 02/17/25 0713    cetirizine (zyrTEC) tablet 5 mg  5 mg Oral or Feeding Tube At Bedtime Denver Shipman MD   5 mg at 02/16/25 2142    epoetin candy-epbx (RETACRIT) injection 10,000 Units  10,000 Units Intravenous Once in dialysis/CRRT Alysia Roman MD        furosemide (LASIX) tablet 80 mg  80 mg Oral or Feeding Tube Daily Enu-Vivian Samaniego MD   80 mg at 02/16/25 0924    hydrALAZINE (APRESOLINE) tablet 75 mg  75 mg Oral Q8H [facility] Carly Faye DO   75 mg at 02/16/25 2142    insulin aspart (NovoLOG) injection (RAPID ACTING)  1-7 Units Subcutaneous Q4H Carly Faye DO   3 Units at 02/17/25 1104    insulin glargine (LANTUS PEN) injection 15 Units  15 Units Subcutaneous BID Denver Shipman MD   15 Units at 02/16/25 2148    ipratropium (ATROVENT) 0.02 % neb solution 0.5 mg  0.5 mg Nebulization 4x daily Johanne Crowell MD   0.5 mg at 02/17/25 0712    levalbuterol (XOPENEX) neb solution 1.25 mg  1.25 mg Nebulization 4x daily Johanne Crowell MD   1.25 mg  at 02/17/25 0712    miconazole (MICATIN) 2 % powder   Topical BID Denver Shipman MD   Given at 02/17/25 0845    midodrine (PROAMATINE) tablet 5 mg  5 mg Oral Once per day on Monday Wednesday Friday Vivian Russell MD   5 mg at 02/10/25 0917    pantoprazole (PROTONIX) 2 mg/mL suspension 40 mg  40 mg Per Feeding Tube QAM  Denver Shipman MD   40 mg at 02/17/25 0839    Or    pantoprazole (PROTONIX) IV push injection 40 mg  40 mg Intravenous QAM  Denver Shipman MD   40 mg at 02/15/25 0921    polyethylene glycol (MIRALAX) Packet 17 g  17 g Oral or Feeding Tube Daily Liset Romero MD   17 g at 02/13/25 0840    Prosource TF20 ENfit Compatibl EN LIQD (PROSOURCE TF20) packet 60 mL  1 packet Per Feeding Tube Daily Truman Moreland MD   60 mL at 02/16/25 1455    QUEtiapine (SEROquel) tablet 50 mg  50 mg Oral or Feeding Tube At Bedtime Cheo Watt MD   50 mg at 02/16/25 2142    sennosides (SENOKOT) syrup 10 mL  10 mL Oral or Feeding Tube BID Liset Romero MD   10 mL at 02/15/25 2128    sertraline (ZOLOFT) tablet 75 mg  75 mg Oral or Feeding Tube QPM Denver Shipman MD   75 mg at 02/16/25 2141    sodium chloride (PF) 0.9% PF flush 3 mL  3 mL Intracatheter Q8H Celine Plata APRN CNP        tacrolimus (PROTOPIC) 0.1 % ointment   Topical BID Denver Shipman MD   Given at 02/17/25 0845    vitamin D3 (CHOLECALCIFEROL) tablet 50 mcg  50 mcg Oral or Feeding Tube Daily Denver Shipman MD   50 mcg at 02/17/25 0839

## 2025-02-17 NOTE — PLAN OF CARE
"Patient Name: Geronimo  MRN: [id number]  Date of Admission: 1/8/2025  Reason for Admission: SOB, influenza, RF   Level of Care:  [facility] [place]     [id number]    Vitals:   BP Readings from Last 1 Encounters:   02/17/25 (!) 158/47     Pulse Readings from Last 1 Encounters:   02/17/25 77     Wt Readings from Last 1 Encounters:   02/09/25 97.3 kg (214 lb 8.1 oz)     Ht Readings from Last 1 Encounters:   02/04/25 1.702 m (5' 7.01\")     Estimated body mass index is 33.59 kg/m  as calculated from the following:    Height as of this encounter: 1.702 m (5' 7.01\").    Weight as of this encounter: 97.3 kg (214 lb 8.1 oz).  Temp Readings from Last 1 Encounters:   02/16/25 98.1  F (36.7  C) (Oral)       Pain: denied pain    Assessment    Resp: LS clear/dim, on 1L NC. Infrequent, congested, productive cough.   Telemetry: SR with freq PAC's  Neuro:  Alert, forgetful of situation, time. Anxious at times about her clinical status and recovery.   GI/: NPO ex ice. +BS, +flatus, x1 incontinent BM this shift. Anuric, pt on dialysis.   Skin/Wounds: Blanchable rednesss to coccyx, mepi in place. Rash under breasts and groin, miconazole applied. AV fistula L arm. L AKA.   Lines/Drains: NG tube 95 cm @ nare, TF running 65ml/hr till 0600, q4 30ml FWF. R PIV's SL. Heparin gtt stopped @ 0200 for g-tube insertion today.   Activity: A2 turn/repo. Pt weight shifting. Refusing repositioning at times, education provided.   Sleep: 5-6 hours between cares   Abnormal Labs: Dialysis patient. Hgb 8.3. Morning BMP pending.     Aggression Stop Light: Green          Patient Care Plan: Dialysis and g-tube insertion today. Encourage pulm hygiene.   "

## 2025-02-18 ENCOUNTER — APPOINTMENT (OUTPATIENT)
Dept: GENERAL RADIOLOGY | Facility: CLINIC | Age: 75
DRG: 207 | End: 2025-02-18
Attending: PHYSICIAN ASSISTANT
Payer: COMMERCIAL

## 2025-02-18 ENCOUNTER — APPOINTMENT (OUTPATIENT)
Dept: SPEECH THERAPY | Facility: CLINIC | Age: 75
DRG: 207 | End: 2025-02-18
Attending: PHYSICIAN ASSISTANT
Payer: COMMERCIAL

## 2025-02-18 LAB
ANION GAP SERPL CALCULATED.3IONS-SCNC: 13 MMOL/L (ref 7–15)
ATRIAL RATE - MUSE: 131 BPM
ATRIAL RATE - MUSE: 340 BPM
ATRIAL RATE - MUSE: 77 BPM
ATRIAL RATE - MUSE: 83 BPM
BUN SERPL-MCNC: 48.9 MG/DL (ref 8–23)
CALCIUM SERPL-MCNC: 10 MG/DL (ref 8.8–10.4)
CHLORIDE SERPL-SCNC: 96 MMOL/L (ref 98–107)
CREAT SERPL-MCNC: 3.14 MG/DL (ref 0.51–0.95)
DIASTOLIC BLOOD PRESSURE - MUSE: NORMAL MMHG
EGFRCR SERPLBLD CKD-EPI 2021: 15 ML/MIN/1.73M2
ERYTHROCYTE [DISTWIDTH] IN BLOOD BY AUTOMATED COUNT: 16.3 % (ref 10–15)
GLUCOSE BLDC GLUCOMTR-MCNC: 139 MG/DL (ref 70–99)
GLUCOSE BLDC GLUCOMTR-MCNC: 149 MG/DL (ref 70–99)
GLUCOSE BLDC GLUCOMTR-MCNC: 199 MG/DL (ref 70–99)
GLUCOSE BLDC GLUCOMTR-MCNC: 206 MG/DL (ref 70–99)
GLUCOSE SERPL-MCNC: 225 MG/DL (ref 70–99)
HCO3 SERPL-SCNC: 29 MMOL/L (ref 22–29)
HCT VFR BLD AUTO: 24.5 % (ref 35–47)
HGB BLD-MCNC: 7.6 G/DL (ref 11.7–15.7)
INTERPRETATION ECG - MUSE: NORMAL
MCH RBC QN AUTO: 30.3 PG (ref 26.5–33)
MCHC RBC AUTO-ENTMCNC: 31 G/DL (ref 31.5–36.5)
MCV RBC AUTO: 98 FL (ref 78–100)
P AXIS - MUSE: 63 DEGREES
P AXIS - MUSE: 91 DEGREES
P AXIS - MUSE: NORMAL DEGREES
P AXIS - MUSE: NORMAL DEGREES
PLATELET # BLD AUTO: 222 10E3/UL (ref 150–450)
POTASSIUM SERPL-SCNC: 3.6 MMOL/L (ref 3.4–5.3)
PR INTERVAL - MUSE: 158 MS
PR INTERVAL - MUSE: 178 MS
PR INTERVAL - MUSE: NORMAL MS
PR INTERVAL - MUSE: NORMAL MS
QRS DURATION - MUSE: 90 MS
QRS DURATION - MUSE: 90 MS
QRS DURATION - MUSE: 92 MS
QRS DURATION - MUSE: 94 MS
QT - MUSE: 338 MS
QT - MUSE: 344 MS
QT - MUSE: 386 MS
QT - MUSE: 404 MS
QTC - MUSE: 389 MS
QTC - MUSE: 453 MS
QTC - MUSE: 497 MS
QTC - MUSE: 531 MS
R AXIS - MUSE: 31 DEGREES
R AXIS - MUSE: 47 DEGREES
R AXIS - MUSE: 83 DEGREES
R AXIS - MUSE: 89 DEGREES
RBC # BLD AUTO: 2.51 10E6/UL (ref 3.8–5.2)
SODIUM SERPL-SCNC: 138 MMOL/L (ref 135–145)
SYSTOLIC BLOOD PRESSURE - MUSE: NORMAL MMHG
T AXIS - MUSE: -80 DEGREES
T AXIS - MUSE: 227 DEGREES
T AXIS - MUSE: 266 DEGREES
T AXIS - MUSE: 3 DEGREES
TROPONIN T SERPL HS-MCNC: 80 NG/L
TROPONIN T SERPL HS-MCNC: 85 NG/L
VENTRICULAR RATE- MUSE: 104 BPM
VENTRICULAR RATE- MUSE: 130 BPM
VENTRICULAR RATE- MUSE: 77 BPM
VENTRICULAR RATE- MUSE: 83 BPM
WBC # BLD AUTO: 4.2 10E3/UL (ref 4–11)

## 2025-02-18 PROCEDURE — 250N000011 HC RX IP 250 OP 636: Mod: JZ | Performed by: HOSPITALIST

## 2025-02-18 PROCEDURE — 250N000013 HC RX MED GY IP 250 OP 250 PS 637: Performed by: PHYSICIAN ASSISTANT

## 2025-02-18 PROCEDURE — 84484 ASSAY OF TROPONIN QUANT: CPT | Performed by: PHYSICIAN ASSISTANT

## 2025-02-18 PROCEDURE — 250N000013 HC RX MED GY IP 250 OP 250 PS 637: Performed by: HOSPITALIST

## 2025-02-18 PROCEDURE — 250N000009 HC RX 250: Performed by: HOSPITALIST

## 2025-02-18 PROCEDURE — 250N000013 HC RX MED GY IP 250 OP 250 PS 637: Performed by: INTERNAL MEDICINE

## 2025-02-18 PROCEDURE — 80048 BASIC METABOLIC PNL TOTAL CA: CPT | Performed by: HOSPITALIST

## 2025-02-18 PROCEDURE — 99233 SBSQ HOSP IP/OBS HIGH 50: CPT | Mod: FS | Performed by: PHYSICIAN ASSISTANT

## 2025-02-18 PROCEDURE — 94640 AIRWAY INHALATION TREATMENT: CPT

## 2025-02-18 PROCEDURE — 36415 COLL VENOUS BLD VENIPUNCTURE: CPT | Performed by: PHYSICIAN ASSISTANT

## 2025-02-18 PROCEDURE — 93005 ELECTROCARDIOGRAM TRACING: CPT

## 2025-02-18 PROCEDURE — 999N000157 HC STATISTIC RCP TIME EA 10 MIN

## 2025-02-18 PROCEDURE — 120N000013 HC R&B IMCU

## 2025-02-18 PROCEDURE — 85048 AUTOMATED LEUKOCYTE COUNT: CPT | Performed by: INTERNAL MEDICINE

## 2025-02-18 PROCEDURE — 93010 ELECTROCARDIOGRAM REPORT: CPT | Performed by: INTERNAL MEDICINE

## 2025-02-18 PROCEDURE — 85014 HEMATOCRIT: CPT | Performed by: INTERNAL MEDICINE

## 2025-02-18 PROCEDURE — 92526 ORAL FUNCTION THERAPY: CPT | Mod: GN

## 2025-02-18 PROCEDURE — 99232 SBSQ HOSP IP/OBS MODERATE 35: CPT | Performed by: HOSPITALIST

## 2025-02-18 PROCEDURE — 36415 COLL VENOUS BLD VENIPUNCTURE: CPT | Performed by: INTERNAL MEDICINE

## 2025-02-18 PROCEDURE — 94640 AIRWAY INHALATION TREATMENT: CPT | Mod: 76

## 2025-02-18 PROCEDURE — 71045 X-RAY EXAM CHEST 1 VIEW: CPT

## 2025-02-18 RX ORDER — METOPROLOL TARTRATE 25 MG/1
25 TABLET, FILM COATED ORAL 2 TIMES DAILY
Status: DISCONTINUED | OUTPATIENT
Start: 2025-02-18 | End: 2025-02-18

## 2025-02-18 RX ADMIN — AMLODIPINE BESYLATE 5 MG: 5 TABLET ORAL at 18:42

## 2025-02-18 RX ADMIN — ACETAMINOPHEN 650 MG: 325 TABLET, FILM COATED ORAL at 16:02

## 2025-02-18 RX ADMIN — LEVALBUTEROL HYDROCHLORIDE 1.25 MG: 1.25 SOLUTION RESPIRATORY (INHALATION) at 07:59

## 2025-02-18 RX ADMIN — LEVALBUTEROL HYDROCHLORIDE 1.25 MG: 1.25 SOLUTION RESPIRATORY (INHALATION) at 20:31

## 2025-02-18 RX ADMIN — QUETIAPINE FUMARATE 50 MG: 50 TABLET ORAL at 21:43

## 2025-02-18 RX ADMIN — APIXABAN 5 MG: 5 TABLET, FILM COATED ORAL at 21:44

## 2025-02-18 RX ADMIN — MICONAZOLE NITRATE: 2 POWDER TOPICAL at 09:54

## 2025-02-18 RX ADMIN — ATORVASTATIN CALCIUM 20 MG: 20 TABLET, FILM COATED ORAL at 21:44

## 2025-02-18 RX ADMIN — SENNOSIDES 10 ML: 8.8 LIQUID ORAL at 21:45

## 2025-02-18 RX ADMIN — MICONAZOLE NITRATE: 2 POWDER TOPICAL at 21:57

## 2025-02-18 RX ADMIN — FUROSEMIDE 80 MG: 40 TABLET ORAL at 09:42

## 2025-02-18 RX ADMIN — AMIODARONE HYDROCHLORIDE 200 MG: 200 TABLET ORAL at 21:44

## 2025-02-18 RX ADMIN — IPRATROPIUM BROMIDE 0.5 MG: 0.5 SOLUTION RESPIRATORY (INHALATION) at 07:59

## 2025-02-18 RX ADMIN — BUDESONIDE 1 MG: 1 INHALANT ORAL at 20:31

## 2025-02-18 RX ADMIN — Medication 40 MG: at 09:42

## 2025-02-18 RX ADMIN — IPRATROPIUM BROMIDE 0.5 MG: 0.5 SOLUTION RESPIRATORY (INHALATION) at 10:00

## 2025-02-18 RX ADMIN — TACROLIMUS: 1 OINTMENT TOPICAL at 09:54

## 2025-02-18 RX ADMIN — TACROLIMUS: 1 OINTMENT TOPICAL at 21:57

## 2025-02-18 RX ADMIN — HYDRALAZINE HYDROCHLORIDE 75 MG: 50 TABLET ORAL at 16:02

## 2025-02-18 RX ADMIN — METOPROLOL TARTRATE 25 MG: 25 TABLET, FILM COATED ORAL at 09:42

## 2025-02-18 RX ADMIN — AMIODARONE HYDROCHLORIDE 200 MG: 200 TABLET ORAL at 09:42

## 2025-02-18 RX ADMIN — HYDRALAZINE HYDROCHLORIDE 75 MG: 50 TABLET ORAL at 21:42

## 2025-02-18 RX ADMIN — LEVALBUTEROL HYDROCHLORIDE 1.25 MG: 1.25 SOLUTION RESPIRATORY (INHALATION) at 10:00

## 2025-02-18 RX ADMIN — IPRATROPIUM BROMIDE 0.5 MG: 0.5 SOLUTION RESPIRATORY (INHALATION) at 15:48

## 2025-02-18 RX ADMIN — ACETAMINOPHEN 650 MG: 325 TABLET, FILM COATED ORAL at 09:42

## 2025-02-18 RX ADMIN — Medication 50 MCG: at 09:42

## 2025-02-18 RX ADMIN — BUDESONIDE 1 MG: 1 INHALANT ORAL at 08:00

## 2025-02-18 RX ADMIN — LEVALBUTEROL HYDROCHLORIDE 1.25 MG: 1.25 SOLUTION RESPIRATORY (INHALATION) at 15:48

## 2025-02-18 RX ADMIN — AMLODIPINE BESYLATE 5 MG: 5 TABLET ORAL at 09:42

## 2025-02-18 RX ADMIN — HYDROMORPHONE HYDROCHLORIDE 0.2 MG: 0.2 INJECTION, SOLUTION INTRAMUSCULAR; INTRAVENOUS; SUBCUTANEOUS at 11:50

## 2025-02-18 RX ADMIN — Medication 60 ML: at 09:42

## 2025-02-18 RX ADMIN — SERTRALINE HYDROCHLORIDE 75 MG: 50 TABLET ORAL at 21:43

## 2025-02-18 RX ADMIN — Medication 5 ML: at 09:42

## 2025-02-18 RX ADMIN — HYDRALAZINE HYDROCHLORIDE 75 MG: 50 TABLET ORAL at 05:07

## 2025-02-18 RX ADMIN — CETIRIZINE HYDROCHLORIDE 5 MG: 5 TABLET ORAL at 21:44

## 2025-02-18 RX ADMIN — IPRATROPIUM BROMIDE 0.5 MG: 0.5 SOLUTION RESPIRATORY (INHALATION) at 20:30

## 2025-02-18 ASSESSMENT — ACTIVITIES OF DAILY LIVING (ADL)
ADLS_ACUITY_SCORE: 83

## 2025-02-18 NOTE — PLAN OF CARE
Goal Outcome Evaluation:      Plan of Care Reviewed With: patient      Orientations: A/O x 4  Vitals/Pain: Vitals stable during the night. C/o pain at the beginning of the shift  Tele: Afib cvr, then converted to SR around 3 am  Lines/Drains: PIV flushed and saline locked  Skin/Wounds: R groin rash, L/R breast moist.  GI/: Patient has PEG with TF that was stopped @ 4 am per MD order, and heparin gtt stopped @ 02 am.  Labs: Abnormal/Trends, Electrolyte Replacement- Electrolytes check with am labs  Ambulation/Assist: Lift  Sleep Quality: Slept through the night  Plan: Patient is NPO for pacemaker placement today.    Patient has refused her 02 am BG check. She does not want to be bothered while she was sleeping.    The CNA went to reposition patient around 0615, but she refused. RN told the CNA to chart the patient's refusal.

## 2025-02-18 NOTE — PROGRESS NOTES
[facility]    Medicine Progress Note - Hospitalist Service    Date of Admission:  1/8/2025    Assessment & Plan   Supriya Herr is a 74 year old female with PMHx significant for ESRD on HD, CHF, COPD, poor mobility (Lt AKA, Obesity, WC bound), DM type II, hypertension.  She was brought to the ER by EMS for evaluation of shortness of breath, diagnosed with influenza A and admitted on 1/8/25       Hospital course summary  Patient was admitted, subsequently intubated for worsening respiratory failure and remained on mechanical ventilator 1/9 - 1/18.  She was reintubated and remained on mechanical ventilator 1/20 - 1/25.  Was treated with Tamiflu, IV antibiotic, steroid, has completed courses.  Hospital course complicated by A-fib RVR.  Was on amiodarone drip with eventual transition to p.o. Also required diltiazem  for rate control.  Required BiPAP--HFNC--intermittent BiPAP use.  Ongoing tube feeding and modified diet per SLP.  Delirium managed with Seroquel.  Concern for recurrent pneumonia, hospital-acquired on 2/1, restarted on antibiotics.  ID consulted, recommended discontinuing antibiotics, respiratory failure likely related to fluid overload.     Patient developed symptomatic bradycardia on 2/5/25 triggering a RRT. She was placed on dopamine drip and transferred back to the ICU. Bradycardia was likely due to rate controlling medications. Cardiology consulted with adjustment of medications. Patient has remained in NSR without recurrence of bradycardia on amio drip. While in the ICU, patient required dialysis on pressors, now transitioned to midodrine . Hypoxic and hypercarbic resp failure is much improved with dialysis.      Patient transferred back to hospitalist service on 2/8/25. Recurrent issues with atrial fib with RVR.     Acute hypoxic respiratory failure-improving  Influenza A pneumonia- resolved  Acute COPD exacerbation- resolved  Hospital-acquired pneumonia  Pharyngeal  edema  Initial presentation with shortness of breath, diagnosed influenza A.  *1/20 bronchoscopy and BAL: culture grew actinomyces  *CT chest showed RML infiltrate versus atelectasis but no sign of actinomycosis, intensivist felt this was likely colonization.    *Pneumonia treated with cefepime and vancomycin, then Levaquin. Completed course 1/26.  *Flu and COPD exacerbation was treated with IV steroids and tamiflu.  *2/1 had increased O2 needs, concern for new infection. repeat blood cx --negative. CT chest showed possible right lower lobe pneumonia and ID was consulted and patient was again started on vancomycin and Levaquin, then subsequently discontinued as respiratory failure was determined to be due to fluid overload as she improved with dialysis.  - Continue budesonide 1 Mg twice daily, ipratropium/xopenex 4 times daily  - weaning off O2, only on 1-2LPM at bedtime.     Recurrent atrial fibrillation with RVR  Acute on chronic CHFpEF  Hypertension  Shock, due to sedation-resolved  Symptomatic bradycardia-Resolved  PTA amlodipine 5 Mg BID, Coreg 25 Mg BID and Lasix.  *1/21/25 TTE: LVEF 60%, no pericardial effusion, no significant valvular disease, diastolic function indeterminate.  No WMA.  *2/2/25 had symptomatic bradycardia due to rate controlling medications requiring RRT. Placed on dopamine gtt, coreg and diltiazem were held. Her heart went back into normal sinus and only amiodarone 200mg daily was continued at that point.  *2/10/25: developed atrial fib with RVR, amiodarone gtt restarted and PO dose increased from daily to BID.  *2/12/25: restarted amlodipine and oral hydralazine for uptrending BPs  *2/15/25: recurrent atrial fib with RVR, RRT called, amiodarone gtt restarted.  -Cardiology has followed on/off due to recurrent atrial fib and bradycardia.   - Intermittent needs for amiodarone gtt, currently on amiodarone oral 200mg BID.  -initial plan for PPM on 2/18; now discontinued per EP. No indication  for PPM at this time.   - A-fib to be managed with digoxin  - Continue with atorvastatin 20 mg daily and Lasix 80 mg daily (hold on dialysis days)  - Eliquis 5mg BID, on hold for g-tube initially--now on hold for PPM 2/18   -heparin gtt intermittently (held at 2am 2/18 for PPM)  - amlodipine 5mg BID, hydralazine 75mg q8h     Encephalopathy likely infectious and metabolic-improved  Anxiety  *1/20/25 CT head: negative for acute intracranial process. Volume loss and chronic microvascular ischemic changes noted. Suspected some baseline cognitive impairment.    *2/10/25: CT head: no acute intracranial process.   *2/10/25 EEG: showed moderate generalized slowing and triphasic waves which commonly seen in chronic kidney disease.   -Of note has had jerking movements during her hospital stay  -Neurology evaluated and recommended vitamins  - stable since 2/11/25: AOx2-3, fluctuating mental status  -Continue Seroquel 50mg qhs  -Continue PTA Zoloft and gabapentin.  -PT, OT recommend TCU     Diabetes mellitus, type 2  HbA1c 6.5% on 1/17/25. PTA Lantus 18u qd and NovoLog 5u TIDWM  -Lantus 15 units BID- HELD FOR NPO 2/18  -medium resistance sliding scale insulin q4h  -Hypoglycemia protocol     ESRD on HD , wasTue-Thu-Sat, now MWF  Anemia of chronic disease  Hyperkalemia-resolved  -Nephrology following, currently MWF dialysis  -Hemoglobin 7-8, at baseline.  -Gets EPO with hemodialysis.  -Continue vitamin D3.    -hold Renvela while npo as it can't be crushed  -Nephrology resumed Lasix on nondialysis days on 2/3.    -continue midodrine daily MWF with dialysis  -Lokelma discontinued on 2/10     Constipation  -Noted on CT 2/2  -Continue the bowel regimen      Dysphagia  Suspect due to recurrent intubation. Did well with video swallow study on 1/31/25, diet advanced.  But again was made n.p.o. on 2/11  *2/16 ENT evaluated with flexible scope: mild edema of arytenoids. No candida infection of pharynx of larynx. No findings to explain  "dysphagia  *2/17 G tube placed with IR  -nutrition following  -tube feeds resumed 4pm 2/17, holding at midnight for PPM.     Likely cerumen impaction- resolved  2/11 reported ear pressure and some ringing  - improved with debrox drops added 2/12, finished course 2/16. Ears clear when evaluated by ENT on 2/16        Diet: Room Service  Adult Formula Drip Feeding: Continuous Aloqa Renal Support; Gastrostomy; Goal Rate: 65; mL/hr; From: 4:00 PM; To: 6:00 AM; run x14 hours nightly for total volume 910 mL (orders updated 2/18 to reflect new enteral access)    DVT Prophylaxis: heparin gtt  Bradshaw Catheter: Not present  Lines: None     Cardiac Monitoring: ACTIVE order. Indication: Tachyarrhythmias, acute (48 hours)  Code Status: Full Code      Clinically Significant Risk Factors         # Hyponatremia: Lowest Na = 127 mmol/L in last 2 days, will monitor as appropriate  # Hypochloremia: Lowest Cl = 88 mmol/L in last 2 days, will monitor as appropriate      # Hypoalbuminemia: Lowest albumin = 2.8 g/dL at 1/13/2025  5:14 AM, will monitor as appropriate       # Hypertension: Noted on problem list  # Chronic heart failure with preserved ejection fraction: heart failure noted on problem list and last echo with EF >50%    # Acute Hypercapnic Respiratory Failure: based on venous blood gas results.  Continue supplemental oxygen and ventilatory support as indicated.        # DMII: A1C = 6.5 % (Ref range: <5.7 %) within past 6 months   # Obesity: Estimated body mass index is 33.07 kg/m  as calculated from the following:    Height as of this encounter: 1.702 m (5' 7.01\").    Weight as of this encounter: 95.8 kg (211 lb 3.2 oz).        # Financial/Environmental Concerns: none         Social Drivers of Health    Tobacco Use: Medium Risk (9/11/2024)    Patient History     Smoking Tobacco Use: Former     Smokeless Tobacco Use: Never   Physical Activity: Insufficiently Active (7/19/2024)    Exercise Vital Sign     Days of Exercise per " Week: 1 day     Minutes of Exercise per Session: 10 min   Social Connections: Unknown (7/19/2024)    Social Connection and Isolation Panel [NHANES]     Frequency of Social Gatherings with Friends and Family: More than three times a week          Disposition Plan     Medically Ready for Discharge: Anticipated in 5+ Days  Await PPM, recovery, med adjustment then eventual discharge.           Vivian Wood MD  Hospitalist Service  [facility]  Securely message with [employer] (more info)  Text page via [employer] Paging/Directory   ______________________________________________________________________    Interval History   Patient laying in bed, doing well awake and alert.  No complaints at this time.  No longer to have PPM placed.    Physical Exam   Vital Signs: Temp: 98.3  F (36.8  C) Temp src: Oral BP: (!) 141/54 Pulse: 73   Resp: 20 SpO2: 93 % O2 Device: Nasal cannula Oxygen Delivery: 1 LPM  Weight: 211 lbs 3.21 oz    General Appearance: Well appearing for stated age.  Respiratory: CTAB, no rales or ronchi  Cardiovascular: S1, S2 normal, no murmurs  GI: non-tender on palpation, BS present      Medical Decision Making       50 MINUTES SPENT BY ME on the date of service doing chart review, history, exam, documentation & further activities per the note.      Data     I have personally reviewed the following data over the past 24 hrs:    4.2  \   7.6 (L)   / 222     138 96 (L) 48.9 (H) /  139 (H)   3.6 29 3.14 (H) \     Trop: 80 (H) BNP: N/A       Imaging results reviewed over the past 24 hrs:   Recent Results (from the past 24 hours)   XR Chest Port 1 View    Narrative    EXAM: XR CHEST PORT 1 VIEW  LOCATION: [facility]  DATE: 2/18/2025    INDICATION: chest pain  COMPARISON: 02/08/2025       Impression    IMPRESSION: Feeding tube and right IJ central line have been removed. Remainder unchanged. Shallow inspiration with atelectasis in the left base. Calcified mitral annulus.  Calcified aortic atherosclerosis. Vascular stent left axilla. Mild degenerative   change in the spine and shoulders.

## 2025-02-18 NOTE — PLAN OF CARE
Shift Summary [id number]:    VSS on 1-2L O2 NC. Oriented x4, intermittent confusion. Lethargic after g-tube placement  Pain managed w/ meds (see MAR). LS dim, DEVRIES. PO suction PRN. Tele A-fib VVR, denies CP. Anuric. LUE fistula WDL. 1 BM this shift, +BS and passing flatus. TF running at 65ml/hr (goal rate). 30ml FWF q4h. B,111,100. Up A2 lift. Repo q2h. Consulted teams: Neph, cards. Discharge tbd. Care plan updated.     See flowsheet for full assessment. See MAR for meds given.    Yahir Oneill, RN  6:42 PM    Goal Outcome Evaluation:      Plan of Care Reviewed With: patient, family    Overall Patient Progress: no change    Outcome Evaluation: IMC status continued.  HD today (see note). Went back into A-fib RVR today. G-tube placed, dressing CDI.      Problem: Dysrhythmia  Goal: Normalized Cardiac Rhythm  Outcome: Not Progressing  Intervention: Monitor and Manage Cardiac Rhythm Effect    Problem: Enteral Nutrition  Goal: Feeding Tolerance  Outcome: Progressing

## 2025-02-18 NOTE — PROGRESS NOTES
"  [facility] EP Progress Note  Date of Service: 02/18/2025    Primary Cardiologist: Dr. Khushboo Dickerson     Supriya Herr is a 75 year old female with a past medical history of ESRD on HD, chronic HFpEF, O2 dependent COPD, L AKA (w/c bound), DM2, HTN and orthostatic hypotension (on midodrine), as well as paroxysmal AFIB admitted 1/8/2025 for severe respiratory failure from influenza A requiring prolonged intubation. Her course has been complicated by rapid AFIB as well as severe symptomatic bradycardia (reportedly with significant sinus pausing although I do not have those strips for review) resulting in an RRT on 2/5 while on a combination of amiodarone, carvedilol, and diltiazem.  She was then maintained on amiodarone 200 mg daily alone and had a 12-hour episode of rapid AF 2/10 starting during dialysis which converted to sinus rhythm after IV amiodarone was re-loaded, then placed on amio 200 BID.     Yesterday, 2/17, she had another episode of very mildly rapid AF (rates 100-120), back in SR 70's since 1a today. She had a very short conversion pause otherwise no bradycardia noted on telemetry recently. At present, she reports new-onset L chest discomfort, TTP in certain spots. Feeding tube removed yesterday. More anemic today (hgb 7.6; baseline >8).    Assessment:  Persistent atrial fibrillation, symptomatic when rapid, with reported significant sinus pause earlier in this admission while on a combination of amiodarone, Coreg, and diltiazem.  Now on amio 200 BID alone with mildly rapid, mildly symptomatic recurrences (~12h episodes) on 2/10 and 2/17. PTA Eliquis is on hold, pt on heparin.   Atypical chest pain.   Chronic severe anemia, with hgb <8 today.   Normal EF.    Plan:   PPM would be a \"soft call\" at this point. Will add Lopressor 25 mg BID.  This may not be as effective as Coreg as it is more readily dialyzed off, but we will be cautious given her prior history. Continue to monitor " on tele.  Troponin and EKG, CXR.  Consider pRBC transfusion to maintain hgb > 8 in setting of arrhythmias.     Plan was formulated under direction of Dr. Trammell.   Kamryn Selby PA-C  [facility]  Vocera page  __________________________________________________________________________  Physical Exam   Temp: 98.5  F (36.9  C) Temp src: Oral BP: 118/51 Pulse: 68   Resp: 18 SpO2: 99 % O2 Device: Nasal cannula Oxygen Delivery: 2 LPM  Vitals:    02/07/25 0615 02/09/25 0600 02/09/25 2300   Weight: 96.1 kg (211 lb 13.8 oz) 91.9 kg (202 lb 9.6 oz) 97.3 kg (214 lb 8.1 oz)       GENERAL:  The patient is in no apparent distress.   NECK: CVP appears normal, no masses or thyromegaly.  PULMONARY:  There is a normal respiratory effort. Clear lungs to auscultation bilaterally.   CARDIOVASCULAR:  RRR, normal S1 S2, no m/r/g.  GI:  Non tender abdomen with normoactive bowel sounds and no hepatosplenomegaly. There are no masses palpable.   EXTREMITIES:  No clubbing, cyanosis or edema.  VASCULAR: 2+ Pulses bilaterally in upper and lower extremities.    Medications   Current Facility-Administered Medications   Medication Dose Route Frequency Provider Last Rate Last Admin    dextrose 10% infusion   Intravenous Continuous PRN Celestina Ross NP        dextrose 10% infusion   Intravenous Continuous PRN Denver Shipman MD        [Held by provider] heparin 25,000 units in 0.45% NaCl 250 mL ANTICOAGULANT infusion  0-5,000 Units/hr Intravenous Continuous Carly Faye DO   Stopped at 02/18/25 0159    No lozenges or gum should be given while patient on BIPAP/AVAPS/AVAPS AE   Does not apply Continuous Denver Blackburn MD        Patient may continue current oral medications   Does not apply Continuous Denver Blackburn MD        sodium chloride 0.9 % infusion   Intravenous Continuous Denver Shipman MD 10 mL/hr at 02/05/25 1831 10 mL/hr at 02/05/25 1831     Current Facility-Administered Medications    Medication Dose Route Frequency Provider Last Rate Last Admin    - MEDICATION INSTRUCTIONS for Dialysis Patients -   Does not apply See Admin Instructions Braden Montana MD        amiodarone (PACERONE) tablet 200 mg  200 mg Oral BID Carly Faye DO   200 mg at 02/17/25 2145    amLODIPine (NORVASC) tablet 5 mg  5 mg Oral BID Johanne Crowell MD   5 mg at 02/17/25 1727    [Held by provider] apixaban ANTICOAGULANT (ELIQUIS) tablet 5 mg  5 mg Oral or Feeding Tube BID Denver Shipman MD   5 mg at 02/14/25 0809    atorvastatin (LIPITOR) tablet 20 mg  20 mg Oral or Feeding Tube QPM Denver Shipman MD   20 mg at 02/17/25 2146    B and C vitamin Complex with folic acid (NEPHRONEX) liquid 5 mL  5 mL Per Feeding Tube Daily Denver Shipman MD   5 mL at 02/16/25 0922    budesonide (PULMICORT) neb solution 1 mg  1 mg Nebulization BID Denver Shipman MD   1 mg at 02/17/25 0713    cetirizine (zyrTEC) tablet 5 mg  5 mg Oral or Feeding Tube At Bedtime Denver Shipman MD   5 mg at 02/17/25 2146    epoetin candy-epbx (RETACRIT) injection 10,000 Units  10,000 Units Intravenous Once in dialysis/CRRT Alysia Roman MD        furosemide (LASIX) tablet 80 mg  80 mg Oral or Feeding Tube Daily Enu-Vivian Samaniego MD   80 mg at 02/16/25 0924    hydrALAZINE (APRESOLINE) tablet 75 mg  75 mg Oral Q8H [facility] Carly Faye DO   75 mg at 02/18/25 0507    insulin aspart (NovoLOG) injection (RAPID ACTING)  1-7 Units Subcutaneous Q4H Carly Faye DO   2 Units at 02/18/25 0508    [Held by provider] insulin glargine (LANTUS PEN) injection 15 Units  15 Units Subcutaneous BID Denver Shipman MD   15 Units at 02/16/25 2148    ipratropium (ATROVENT) 0.02 % neb solution 0.5 mg  0.5 mg Nebulization 4x daily Johanne Crowell MD   0.5 mg at 02/17/25 1845    levalbuterol (XOPENEX) neb solution 1.25 mg  1.25 mg Nebulization 4x daily Johanne Crowell MD   1.25 mg at 02/17/25 1845    miconazole (MICATIN)  2 % powder   Topical BID Denver Shipman MD   Given at 02/17/25 2146    midodrine (PROAMATINE) tablet 5 mg  5 mg Oral Once per day on Monday Wednesday Friday Vivian Russell MD   5 mg at 02/10/25 0917    pantoprazole (PROTONIX) 2 mg/mL suspension 40 mg  40 mg Per Feeding Tube QAM  Denver Shipman MD   40 mg at 02/17/25 0839    Or    pantoprazole (PROTONIX) IV push injection 40 mg  40 mg Intravenous QAM  Denver Shipman MD   40 mg at 02/15/25 0921    polyethylene glycol (MIRALAX) Packet 17 g  17 g Oral or Feeding Tube Daily Liset Romero MD   17 g at 02/13/25 0840    Prosource TF20 ENfit Compatibl EN LIQD (PROSOURCE TF20) packet 60 mL  1 packet Per Feeding Tube Daily Truman Moreland MD   60 mL at 02/16/25 1455    QUEtiapine (SEROquel) tablet 50 mg  50 mg Oral or Feeding Tube At Bedtime Cheo Watt MD   50 mg at 02/17/25 2145    sennosides (SENOKOT) syrup 10 mL  10 mL Oral or Feeding Tube BID Liset Romero MD   10 mL at 02/17/25 2144    sertraline (ZOLOFT) tablet 75 mg  75 mg Oral or Feeding Tube QPM Denver Shipman MD   75 mg at 02/17/25 2146    sodium chloride (PF) 0.9% PF flush 3 mL  3 mL Intracatheter Q8H Celine Plata APRN CNP   3 mL at 02/18/25 0507    tacrolimus (PROTOPIC) 0.1 % ointment   Topical BID Denver Shipman MD   Given at 02/17/25 2147    vitamin D3 (CHOLECALCIFEROL) tablet 50 mcg  50 mcg Oral or Feeding Tube Daily Denver Shipman MD   50 mcg at 02/17/25 0839       Data   Most Recent 3 CBC's:  Recent Labs   Lab Test 02/18/25  0447 02/17/25  0544 02/16/25  0529   WBC 4.2 5.1 4.9   HGB 7.6* 8.3* 8.0*   MCV 98 97 99    212 215     Most Recent 3 BMP's:  Recent Labs   Lab Test 02/18/25  0447 02/18/25  0439 02/17/25  2150 02/17/25  0626 02/17/25  0544 02/16/25  0728 02/16/25  0529     --   --   --  127*  --  133*   POTASSIUM 3.6  --   --   --  4.7  --  3.7   CHLORIDE 96*  --   --   --  88*  --  94*   CO2 29  --   --   --  22 --  25   BUN 48.9*  --    --   --  96.2*  --  73.1*   CR 3.14*  --   --   --  4.70*  --  3.89*   ANIONGAP 13  --   --   --  17*  --  14   JODY 10.0  --   --   --  10.1  --  10.0   * 206* 149*   < > 231*   < > 221*  252*    < > = values in this interval not displayed.     Most Recent 2 LFT's:  Recent Labs   Lab Test 02/05/25 2004 02/02/25  0542   AST 17 12   ALT 24 22   ALKPHOS 75 55   BILITOTAL 0.3 0.2     Most Recent TSH and T4:  Recent Labs   Lab Test 02/05/25  1736   TSH 5.67*   T4 1.32

## 2025-02-18 NOTE — PROGRESS NOTES
Care Management Follow Up    Length of Stay (days): 41    Expected Discharge Date: 02/22/2025     Concerns to be Addressed: other (see comments) (elevated risk)     Patient plan of care discussed at interdisciplinary rounds: Yes    Anticipated Discharge Disposition: Skilled Nursing Facility  Anticipated Discharge Services: None  Anticipated Discharge DME: None    Patient/family educated on Medicare website which has current facility and service quality ratings: no  Education Provided on the Discharge Plan: No  Patient/Family in Agreement with the Plan: yes    Referrals Placed by CM/SW:    Private pay costs discussed: Not applicable    Discussed  Partnership in Safe Discharge Planning  document with patient/family: No     Handoff Completed: No, handoff not indicated or clinically appropriate    Additional Information:  SW reviewed pt at rounds and checked for TCU updates- Following TCU still pending: [facility], [facility].      Next Steps: Secure TCU when Med ready.     Rafia Ugalde, BSW

## 2025-02-18 NOTE — CONSULTS
SPIRITUAL HEALTH SERVICES  SPIRITUAL ASSESSMENT Consult Note  [facility] [place]     REFERRAL SOURCE: Consult for support    Introduced Spiritual Health services/role to Supriya. She shared that she is supported by her daughter, son, and grandson. She looks forward to being able to spend time with her grandson once she discharges. Supriya enjoys coloring, but has not had the energy to do so while she's been in the hospital.     I offered spiritual and emotional support through reflective listening that affirmed emotions, experience, and meaning. Offered assurance through prayer which incorporated conversational themes.    PLAN: Spiritual Health remains available for support.     Cindy Pak  Associate      [facility]S available 24/7 for emergent requests/referrals, either by paging the on-call  or by entering an ASAP/STAT consult in Epic (this will also page the on-call ).

## 2025-02-18 NOTE — PROVIDER NOTIFICATION
Dr. Oswald paged [phone]: Pt HR dropped to 48. Asymptomatic. Last /79. She went into A-fib earlier during HD. When her HR slowed down it looks like she was try to convert back to SR. EP is following and planning for pacemaker tomorrow.    1847: Provider responded back, no interventions at this time.

## 2025-02-18 NOTE — PROGRESS NOTES
"CLINICAL NUTRITION SERVICES - REASSESSMENT NOTE     Malnutrition Status:    Patient does not meet two of the established criteria necessary for diagnosing malnutrition     Registered Dietitian Interventions:  - Weigh patient    Continue TF + Prosource TF20 as ordered to meet 100% of estimated needs while NPO.   TF order updated to reflect new enteral access.        CURRENT NUTRITION ORDERS  Diet: NPO (since 02/11)  Nutrition Support Enteral:  Type of Feeding Tube: GT (placed 2/17)   Enteral Frequency:  Continuous  Enteral Regimen: Grecia Estrella Renal @ 65 mL/hr x 14 hours (4 pm - 6 am)  Total Enteral Provisions: 910 mL, 1638 kcal (25 kcal/kg), 73 g protein (1.4 g/kg), 157 g CHO, 18 g fiber, 601 mL free water.   + 1 Pkt ProSource TF20 (80 kcal, 20 g protein)  TOTAL (TF + Prosource) = 1718 kcal (26 kcal/kg), 93 g protein (1.4 g/kg)   Free Water Flush: 30 mL q 4 hours     CURRENT INTAKE/TOLERANCE  - Currently meeting 100% of estimated needs on tube feeds, since 2/12.   - Held since MN for planned PPM today.      NEW FINDINGS  Weight: Last weight measured on 2/9.   Skin/wounds: no PI  GI symptoms: BM x1 yesterday. 0-1 BM recorded daily.   Nutrition-relevant labs:  BUN 48.9 (H), Cr 3.14 (H), GFR 15 (L) -- HD yesterday. -206.   Nutrition-relevant medications:  Nephronex, Med sliding scale insulin + Lantus 15 units, vitamin D3    2/16 ENT \"evaluated with flexible scope: mild edema of arytenoids. No candida infection of pharynx of larynx. No findings to explain dysphagia.\"  2/17 G tube placed with IR  2/17 HD  2/18 PPM    ASSESSED NUTRITION NEEDS (DW = 65.5 kg (adjusted))  Estimated Energy Needs: 9181-9051 kcals/day (25-30 kcals/kg)  Justification: Obese  Estimated Protein Needs:  grams protein/day (1.2-1.5 grams of pro/kg)  Justification: Hypercatabolism with acute illness, HD    EVALUATION OF THE PROGRESS TOWARD GOALS   Previous Goals  TF to provide % estimated needs while NPO.   Evaluation: " Met    Previous Nutrition Diagnosis  Inadequate energy intake related to decreased swallowing ability as evidenced by now NPO, TF only meeting ~75% estimated energy needs.   Evaluation: Resolved    NUTRITION DIAGNOSIS  Inadequate oral intake related to unsafe swallow as evidenced by NPO with reliance on TF to meet 100% of estimated nutrient needs.    INTERVENTIONS  See nutrition interventions above    Goals  TF @ goal to provide % estimated needs.      Monitoring/Evaluation      Progress toward goals will be monitored and evaluated per policy.    Ladonna Wallace RD, LD  Pager: [phone]  Available on [employer] [facility]

## 2025-02-18 NOTE — PROGRESS NOTES
Addendum to earlier EP prog note:   Will use low dose digoxin instead of metoprolol for rate control. Lopressor 25 already given this AM, so will start dig 62.5 mcg tomorrow.  Continue to monitor on tele.   No acute abnormalities on EKG or CXR. Trop pending.

## 2025-02-19 DIAGNOSIS — Z93.1 GASTROSTOMY IN PLACE (H): Primary | ICD-10-CM

## 2025-02-19 LAB
ANION GAP SERPL CALCULATED.3IONS-SCNC: 13 MMOL/L (ref 7–15)
BUN SERPL-MCNC: 77.8 MG/DL (ref 8–23)
CALCIUM SERPL-MCNC: 10.2 MG/DL (ref 8.8–10.4)
CHLORIDE SERPL-SCNC: 93 MMOL/L (ref 98–107)
CREAT SERPL-MCNC: 4.36 MG/DL (ref 0.51–0.95)
EGFRCR SERPLBLD CKD-EPI 2021: 10 ML/MIN/1.73M2
ERYTHROCYTE [DISTWIDTH] IN BLOOD BY AUTOMATED COUNT: 16.6 % (ref 10–15)
GLUCOSE BLDC GLUCOMTR-MCNC: 100 MG/DL (ref 70–99)
GLUCOSE BLDC GLUCOMTR-MCNC: 144 MG/DL (ref 70–99)
GLUCOSE BLDC GLUCOMTR-MCNC: 155 MG/DL (ref 70–99)
GLUCOSE BLDC GLUCOMTR-MCNC: 186 MG/DL (ref 70–99)
GLUCOSE BLDC GLUCOMTR-MCNC: 191 MG/DL (ref 70–99)
GLUCOSE BLDC GLUCOMTR-MCNC: 240 MG/DL (ref 70–99)
GLUCOSE BLDC GLUCOMTR-MCNC: 251 MG/DL (ref 70–99)
GLUCOSE SERPL-MCNC: 221 MG/DL (ref 70–99)
HCO3 SERPL-SCNC: 28 MMOL/L (ref 22–29)
HCT VFR BLD AUTO: 24.2 % (ref 35–47)
HGB BLD-MCNC: 7.6 G/DL (ref 11.7–15.7)
MCH RBC QN AUTO: 30.5 PG (ref 26.5–33)
MCHC RBC AUTO-ENTMCNC: 31.4 G/DL (ref 31.5–36.5)
MCV RBC AUTO: 97 FL (ref 78–100)
PLATELET # BLD AUTO: 228 10E3/UL (ref 150–450)
POTASSIUM SERPL-SCNC: 3.9 MMOL/L (ref 3.4–5.3)
RBC # BLD AUTO: 2.49 10E6/UL (ref 3.8–5.2)
SODIUM SERPL-SCNC: 134 MMOL/L (ref 135–145)
WBC # BLD AUTO: 5.9 10E3/UL (ref 4–11)

## 2025-02-19 PROCEDURE — 250N000013 HC RX MED GY IP 250 OP 250 PS 637: Performed by: PHYSICIAN ASSISTANT

## 2025-02-19 PROCEDURE — 999N000157 HC STATISTIC RCP TIME EA 10 MIN

## 2025-02-19 PROCEDURE — 94640 AIRWAY INHALATION TREATMENT: CPT | Mod: 76

## 2025-02-19 PROCEDURE — 250N000013 HC RX MED GY IP 250 OP 250 PS 637: Performed by: HOSPITALIST

## 2025-02-19 PROCEDURE — 90935 HEMODIALYSIS ONE EVALUATION: CPT | Performed by: INTERNAL MEDICINE

## 2025-02-19 PROCEDURE — 250N000013 HC RX MED GY IP 250 OP 250 PS 637: Performed by: INTERNAL MEDICINE

## 2025-02-19 PROCEDURE — 99232 SBSQ HOSP IP/OBS MODERATE 35: CPT | Performed by: HOSPITALIST

## 2025-02-19 PROCEDURE — 250N000009 HC RX 250: Performed by: HOSPITALIST

## 2025-02-19 PROCEDURE — 36415 COLL VENOUS BLD VENIPUNCTURE: CPT | Performed by: HOSPITALIST

## 2025-02-19 PROCEDURE — 120N000013 HC R&B IMCU

## 2025-02-19 PROCEDURE — 250N000011 HC RX IP 250 OP 636: Mod: JZ | Performed by: HOSPITALIST

## 2025-02-19 PROCEDURE — 250N000011 HC RX IP 250 OP 636: Performed by: INTERNAL MEDICINE

## 2025-02-19 PROCEDURE — 85018 HEMOGLOBIN: CPT | Performed by: INTERNAL MEDICINE

## 2025-02-19 PROCEDURE — 634N000001 HC RX 634: Mod: JZ | Performed by: INTERNAL MEDICINE

## 2025-02-19 PROCEDURE — 94640 AIRWAY INHALATION TREATMENT: CPT

## 2025-02-19 PROCEDURE — 80048 BASIC METABOLIC PNL TOTAL CA: CPT | Performed by: HOSPITALIST

## 2025-02-19 PROCEDURE — 258N000003 HC RX IP 258 OP 636: Performed by: INTERNAL MEDICINE

## 2025-02-19 PROCEDURE — 99232 SBSQ HOSP IP/OBS MODERATE 35: CPT | Mod: FS | Performed by: NURSE PRACTITIONER

## 2025-02-19 RX ORDER — HEPARIN SODIUM 1000 [USP'U]/ML
500 INJECTION, SOLUTION INTRAVENOUS; SUBCUTANEOUS CONTINUOUS
Status: DISCONTINUED | OUTPATIENT
Start: 2025-02-19 | End: 2025-02-20

## 2025-02-19 RX ORDER — ALBUMIN (HUMAN) 12.5 G/50ML
50 SOLUTION INTRAVENOUS
Status: DISCONTINUED | OUTPATIENT
Start: 2025-02-19 | End: 2025-02-20

## 2025-02-19 RX ORDER — AMLODIPINE BESYLATE 10 MG/1
10 TABLET ORAL 2 TIMES DAILY
Status: DISCONTINUED | OUTPATIENT
Start: 2025-02-19 | End: 2025-02-26 | Stop reason: HOSPADM

## 2025-02-19 RX ADMIN — TACROLIMUS: 1 OINTMENT TOPICAL at 08:33

## 2025-02-19 RX ADMIN — CETIRIZINE HYDROCHLORIDE 5 MG: 5 TABLET ORAL at 21:21

## 2025-02-19 RX ADMIN — HEPARIN SODIUM 500 UNITS/HR: 1000 INJECTION INTRAVENOUS; SUBCUTANEOUS at 14:59

## 2025-02-19 RX ADMIN — ACETAMINOPHEN 650 MG: 325 TABLET, FILM COATED ORAL at 06:37

## 2025-02-19 RX ADMIN — AMLODIPINE BESYLATE 5 MG: 5 TABLET ORAL at 08:30

## 2025-02-19 RX ADMIN — LEVALBUTEROL HYDROCHLORIDE 1.25 MG: 1.25 SOLUTION RESPIRATORY (INHALATION) at 11:23

## 2025-02-19 RX ADMIN — ATORVASTATIN CALCIUM 20 MG: 20 TABLET, FILM COATED ORAL at 21:22

## 2025-02-19 RX ADMIN — SODIUM CHLORIDE 250 ML: 9 INJECTION, SOLUTION INTRAVENOUS at 14:59

## 2025-02-19 RX ADMIN — ACETAMINOPHEN 650 MG: 325 TABLET, FILM COATED ORAL at 21:22

## 2025-02-19 RX ADMIN — BUDESONIDE 1 MG: 1 INHALANT ORAL at 07:16

## 2025-02-19 RX ADMIN — AMIODARONE HYDROCHLORIDE 200 MG: 200 TABLET ORAL at 08:30

## 2025-02-19 RX ADMIN — IPRATROPIUM BROMIDE 0.5 MG: 0.5 SOLUTION RESPIRATORY (INHALATION) at 11:23

## 2025-02-19 RX ADMIN — IRON SUCROSE 50 MG: 20 INJECTION, SOLUTION INTRAVENOUS at 14:59

## 2025-02-19 RX ADMIN — HYDRALAZINE HYDROCHLORIDE 75 MG: 50 TABLET ORAL at 17:17

## 2025-02-19 RX ADMIN — IPRATROPIUM BROMIDE 0.5 MG: 0.5 SOLUTION RESPIRATORY (INHALATION) at 19:52

## 2025-02-19 RX ADMIN — MIDODRINE HYDROCHLORIDE 5 MG: 5 TABLET ORAL at 08:30

## 2025-02-19 RX ADMIN — SENNOSIDES 10 ML: 8.8 LIQUID ORAL at 21:22

## 2025-02-19 RX ADMIN — BUDESONIDE 1 MG: 1 INHALANT ORAL at 19:52

## 2025-02-19 RX ADMIN — MICONAZOLE NITRATE: 2 POWDER TOPICAL at 08:33

## 2025-02-19 RX ADMIN — QUETIAPINE FUMARATE 50 MG: 50 TABLET ORAL at 21:21

## 2025-02-19 RX ADMIN — HYDRALAZINE HYDROCHLORIDE 75 MG: 50 TABLET ORAL at 06:36

## 2025-02-19 RX ADMIN — TACROLIMUS: 1 OINTMENT TOPICAL at 21:34

## 2025-02-19 RX ADMIN — MICONAZOLE NITRATE: 2 POWDER TOPICAL at 21:34

## 2025-02-19 RX ADMIN — HEPARIN SODIUM 500 UNITS: 1000 INJECTION INTRAVENOUS; SUBCUTANEOUS at 14:59

## 2025-02-19 RX ADMIN — IPRATROPIUM BROMIDE 0.5 MG: 0.5 SOLUTION RESPIRATORY (INHALATION) at 07:16

## 2025-02-19 RX ADMIN — Medication 60 ML: at 08:38

## 2025-02-19 RX ADMIN — HYDROMORPHONE HYDROCHLORIDE 0.2 MG: 0.2 INJECTION, SOLUTION INTRAMUSCULAR; INTRAVENOUS; SUBCUTANEOUS at 15:32

## 2025-02-19 RX ADMIN — Medication 5 ML: at 08:38

## 2025-02-19 RX ADMIN — APIXABAN 5 MG: 5 TABLET, FILM COATED ORAL at 21:22

## 2025-02-19 RX ADMIN — ACETAMINOPHEN 650 MG: 325 TABLET, FILM COATED ORAL at 12:19

## 2025-02-19 RX ADMIN — SERTRALINE HYDROCHLORIDE 75 MG: 50 TABLET ORAL at 21:21

## 2025-02-19 RX ADMIN — LEVALBUTEROL HYDROCHLORIDE 1.25 MG: 1.25 SOLUTION RESPIRATORY (INHALATION) at 19:51

## 2025-02-19 RX ADMIN — SODIUM CHLORIDE 200 ML: 9 INJECTION, SOLUTION INTRAVENOUS at 14:59

## 2025-02-19 RX ADMIN — AMIODARONE HYDROCHLORIDE 200 MG: 200 TABLET ORAL at 21:21

## 2025-02-19 RX ADMIN — AMLODIPINE BESYLATE 10 MG: 10 TABLET ORAL at 17:17

## 2025-02-19 RX ADMIN — HYDRALAZINE HYDROCHLORIDE 75 MG: 50 TABLET ORAL at 21:20

## 2025-02-19 RX ADMIN — EPOETIN ALFA-EPBX 4000 UNITS: 4000 INJECTION, SOLUTION INTRAVENOUS; SUBCUTANEOUS at 14:58

## 2025-02-19 RX ADMIN — APIXABAN 5 MG: 5 TABLET, FILM COATED ORAL at 08:30

## 2025-02-19 RX ADMIN — ACETAMINOPHEN 650 MG: 325 TABLET, FILM COATED ORAL at 17:17

## 2025-02-19 RX ADMIN — LEVALBUTEROL HYDROCHLORIDE 1.25 MG: 1.25 SOLUTION RESPIRATORY (INHALATION) at 07:16

## 2025-02-19 RX ADMIN — FUROSEMIDE 80 MG: 40 TABLET ORAL at 08:30

## 2025-02-19 RX ADMIN — PANTOPRAZOLE SODIUM 40 MG: 40 INJECTION, POWDER, FOR SOLUTION INTRAVENOUS at 08:29

## 2025-02-19 RX ADMIN — DIGOXIN 62.5 MCG: 125 TABLET ORAL at 08:38

## 2025-02-19 RX ADMIN — Medication 50 MCG: at 08:30

## 2025-02-19 ASSESSMENT — ACTIVITIES OF DAILY LIVING (ADL)
ADLS_ACUITY_SCORE: 77
ADLS_ACUITY_SCORE: 83
ADLS_ACUITY_SCORE: 75
ADLS_ACUITY_SCORE: 83
ADLS_ACUITY_SCORE: 75
ADLS_ACUITY_SCORE: 79
ADLS_ACUITY_SCORE: 83
ADLS_ACUITY_SCORE: 75
ADLS_ACUITY_SCORE: 83
ADLS_ACUITY_SCORE: 75
ADLS_ACUITY_SCORE: 83
ADLS_ACUITY_SCORE: 83
ADLS_ACUITY_SCORE: 77
ADLS_ACUITY_SCORE: 83
ADLS_ACUITY_SCORE: 83
ADLS_ACUITY_SCORE: 75
ADLS_ACUITY_SCORE: 83
ADLS_ACUITY_SCORE: 75
ADLS_ACUITY_SCORE: 83
ADLS_ACUITY_SCORE: 75
ADLS_ACUITY_SCORE: 75

## 2025-02-19 NOTE — PROGRESS NOTES
DIALYSIS PROCEDURE NOTE  Hepatitis status of previous patient on machine log was checked and verified ok to use with this patients hepatitis status.  Patient dialyzed for 3 hrs. on a K3 bath with a net fluid removal of  1.5L.  A BFR of 450 ml/min was obtained via a left arm AVfistula using 15 gauge needles.      The treatment plan was discussed with Dr. Oneill during the treatment.    Total heparin received during the treatment: 1700 units.   Needle cannulation sites held x 5 min.       Meds  given: Epo and Iron.   Complications: none      Person educated: pt. Knowledge base substantial. Barriers to learning: none. Educated on procedure via verbal mode. Patient verbalized understanding.   ICEBOAT? Timeout performed pre-treatment  I: Patient was identified using 2 identifiers  C:  Consent Signed Yes  E: Equipment preventative maintenance is current and dialysis delivery system OK to use  B: Hepatitis B Surface Antigen: Negative; Draw Date: 2/6/2025      Hepatitis B Surface Antibody: Susceptible; Draw Date: 1/9/2025  O: Dialysis orders present and complete prior to treatment  A: Vascular access verified and assessed prior to treatment  T: Treatment was performed at a clinically appropriate time  ?: Patient was allowed to ask questions and address concerns prior to treatment  See Adult Hemodialysis flowsheet in [facility] for further details and post assessment.  Machine water alarm in place and functioning. Transducer pods intact and checked every 15min.   Pt assisted with repositioning throughout dialysis treatment.  Pt returned via bed.  Chlorine/Chloramine water system checked every 4 hours.  Outpatient Dialysis at [facility].      Post treatment report given to ***, RN regarding 1.5L of fluid removed, last BP      Marianne REYES RN

## 2025-02-19 NOTE — PLAN OF CARE
"Goal Outcome Evaluation:    Patient Name: Geronimo  MRN: [id number]  Date of Admission: 1/8/2025  Reason for Admission: Acute Hypoxic RF d/t Pneumonia  Level of Care: Acute    Vitals:   BP (!) 159/63   Pulse 74   Temp 98.4  F (36.9  C) (Oral)   Resp 19   Ht 1.702 m (5' 7.01\")   Wt 95.8 kg (211 lb 3.2 oz)   LMP  (LMP Unknown)   SpO2 98%   BMI 33.07 kg/m      Pain: C/o Abd pain this morning, PRN Tyl administered.   CV Surgery Patient: No    Assessment    Resp: LSD, on 2L NC  Telemetry: SR  Neuro: A&Ox4, forgetful  GI/: +BS, on HD  Skin/Wounds: Bilat. Groin rash, redness under R/L breast and abdominal folds.  Lines/Drains: R/L PIV SL, L fistula dressing intact.  Activity: A2 CL T/R  Sleep: Well  Abnormal Labs: Cr. 4.36, HD today, Na+ 134    Aggression Stop Light: Green          Patient Care Plan: HD, Discharge TBD.    Jacky Beal RN      "

## 2025-02-19 NOTE — PROGRESS NOTES
Assessment and Plan:     ESRD: seen on dialysis. 3h,  BFR, 1.5 L UF. 3K 35 HCO3 and 140 Na.   Midodrine pre-run. Low dose heparin.   On vit D,               Interval History:   Anemia: on EPO + venofer with dialysis.     Hypertension: amlodipine,  lasix, hydralazine. Adjust doses for better control.       Tube feeds:            Review of Systems:   No complaints on the run.           Medications:     Current Facility-Administered Medications   Medication Dose Route Frequency Provider Last Rate Last Admin    - MEDICATION INSTRUCTIONS for Dialysis Patients -   Does not apply See Admin Instructions Braden Montana MD        amiodarone (PACERONE) tablet 200 mg  200 mg Oral BID Carly Faye DO   200 mg at 02/19/25 0830    amLODIPine (NORVASC) tablet 5 mg  5 mg Oral BID Johanne Crowell MD   5 mg at 02/19/25 0830    apixaban ANTICOAGULANT (ELIQUIS) tablet 5 mg  5 mg Oral or Feeding Tube BID Don Trammell MD   5 mg at 02/19/25 0830    atorvastatin (LIPITOR) tablet 20 mg  20 mg Oral or Feeding Tube QPM Denver Shipman MD   20 mg at 02/18/25 2144    B and C vitamin Complex with folic acid (NEPHRONEX) liquid 5 mL  5 mL Per Feeding Tube Daily Denver Shipman MD   5 mL at 02/19/25 0838    budesonide (PULMICORT) neb solution 1 mg  1 mg Nebulization BID Denver Shipman MD   1 mg at 02/19/25 0716    cetirizine (zyrTEC) tablet 5 mg  5 mg Oral or Feeding Tube At Bedtime Denver Shipman MD   5 mg at 02/18/25 2144    digoxin (LANOXIN) half-tab 62.5 mcg  62.5 mcg Oral Daily Kamryn Selby PA-C   62.5 mcg at 02/19/25 0838    epoetin candy-epbx (RETACRIT) injection 4,000 Units  4,000 Units Intravenous Once in dialysis/CRRT Braden Oneill MD        furosemide (LASIX) tablet 80 mg  80 mg Oral or Feeding Tube Daily Enu-Vivian Samaniego MD   80 mg at 02/19/25 0830    heparin (porcine) injection  500 Units Hemodialysis Machine OR IV Push Once in dialysis/CRRT Braden Oneill  MD Brenden        hydrALAZINE (APRESOLINE) tablet 75 mg  75 mg Oral Q8H [facility] Carly Faye DO   75 mg at 02/19/25 0636    insulin aspart (NovoLOG) injection (RAPID ACTING)  1-7 Units Subcutaneous Q4H Carly Faye DO   2 Units at 02/19/25 1219    [Held by provider] insulin glargine (LANTUS PEN) injection 15 Units  15 Units Subcutaneous BID Denver Shipman MD   15 Units at 02/16/25 2148    ipratropium (ATROVENT) 0.02 % neb solution 0.5 mg  0.5 mg Nebulization 4x daily Johanne Crowell MD   0.5 mg at 02/19/25 1123    iron sucrose (VENOFER) injection 50 mg  50 mg Intravenous Once in dialysis/CRRT Braden Oneill MD        levalbuterol (XOPENEX) neb solution 1.25 mg  1.25 mg Nebulization 4x daily Johanne Crowell MD   1.25 mg at 02/19/25 1123    miconazole (MICATIN) 2 % powder   Topical BID Denver Shipman MD   Given at 02/19/25 0833    midodrine (PROAMATINE) tablet 5 mg  5 mg Oral Once per day on Monday Wednesday Friday Vivian Russell MD   5 mg at 02/19/25 0830    pantoprazole (PROTONIX) 2 mg/mL suspension 40 mg  40 mg Per Feeding Tube QAM Denver Mays MD   40 mg at 02/18/25 0942    Or    pantoprazole (PROTONIX) IV push injection 40 mg  40 mg Intravenous QA Denver Mays MD   40 mg at 02/19/25 0829    polyethylene glycol (MIRALAX) Packet 17 g  17 g Oral or Feeding Tube Daily Liset Romero MD   17 g at 02/13/25 0840    Prosource TF20 [employer] Compatibl EN LIQD (PROSOURCE TF20) packet 60 mL  1 packet Per Feeding Tube Daily Truman Moreland MD   60 mL at 02/19/25 0838    QUEtiapine (SEROquel) tablet 50 mg  50 mg Oral or Feeding Tube At Bedtime Cheo Watt MD   50 mg at 02/18/25 2143    sennosides (SENOKOT) syrup 10 mL  10 mL Oral or Feeding Tube BID Liset Romero MD   10 mL at 02/18/25 2145    sertraline (ZOLOFT) tablet 75 mg  75 mg Oral or Feeding Tube QPM Denver Shipman MD   75 mg at 02/18/25 2143    sodium chloride (PF) 0.9% PF flush 3 mL  3 mL  Intracatheter Q8H Boni Celine Mckeon, APRN CNP   3 mL at 02/18/25 2158    sodium chloride 0.9% BOLUS 200 mL  200 mL Hemodialysis Machine Once Braden Oneill MD        sodium chloride 0.9% BOLUS 250 mL  250 mL Intravenous Once in dialysis/CRRT Braden Oneill MD        tacrolimus (PROTOPIC) 0.1 % ointment   Topical BID Denver Shipman MD   Given at 02/19/25 0833    vitamin D3 (CHOLECALCIFEROL) tablet 50 mcg  50 mcg Oral or Feeding Tube Daily Denver Shipman MD   50 mcg at 02/19/25 0830     Current Facility-Administered Medications   Medication Dose Route Frequency Provider Last Rate Last Admin    dextrose 10% infusion   Intravenous Continuous PRN Denver Shipman MD        heparin 10,000 units/10 mL infusion (DIALYSIS USE)  500 Units/hr Hemodialysis Machine Continuous Braden Oneill MD        No lozenges or gum should be given while patient on BIPAP/AVAPS/AVAPS AE   Does not apply Continuous PRDenver Zuniga MD        Patient may continue current oral medications   Does not apply Continuous PRDenver Zuniga MD        sodium chloride 0.9 % infusion   Intravenous Continuous Denver Shipman MD 10 mL/hr at 02/05/25 1831 10 mL/hr at 02/05/25 1831    Stop Heparin 60 minutes before end of treatment   Does not apply Continuous PRN Braden Oneill MD         Current active medications and PTA medications reviewed, see medication list for details.            Physical Exam:   Vitals were reviewed  Patient Vitals for the past 24 hrs:   BP Temp Temp src Pulse Resp SpO2 Weight   02/19/25 1415 137/54 -- -- 72 -- -- --   02/19/25 1400 (!) 143/54 -- -- 68 -- -- --   02/19/25 1345 (!) 144/60 -- -- 74 -- -- --   02/19/25 1330 (!) 143/49 -- -- 76 -- -- --   02/19/25 1315 (!) 159/54 -- -- 68 -- -- --   02/19/25 1248 (!) 156/57 98.8  F (37.1  C) Oral 80 20 96 % --   02/19/25 1200 (!) 165/51 97.9  F (36.6  C) -- 79 -- 97 % --   02/19/25 1123 -- -- -- -- -- 96 % --   02/19/25 1055 -- 97.9  F  (36.6  C) Oral -- -- -- --   25 1000 (!) 161/56 -- -- 84 -- 93 % --   25 0800 (!) 156/56 98.6  F (37  C) Oral 79 21 98 % --   25 0745 (!) 161/53 -- -- 78 -- 97 % --   25 0734 (!) 161/53 97.6  F (36.4  C) Oral 78 24 97 % --   25 0716 -- -- -- -- -- 97 % --   25 0636 (!) 15963 -- -- -- -- -- --   25 0600 (!) 15963 -- -- 74 -- 98 % --   25 0400 (!) 146/50 -- -- 73 -- 97 % --   25 0315 -- 98.4  F (36.9  C) Oral -- -- -- --   25 0000 (!) 147/45 -- -- 74 -- 98 % --   25 2200 (!) 148/53 -- -- 78 -- 96 % --   25 2000 (!) 157/62 97.9  F (36.6  C) Oral 75 19 96 % --   25 1800 (!) 153/56 -- -- 77 -- 93 % --   25 1607 -- -- -- -- -- -- 95.8 kg (211 lb 3.2 oz)   25 1600 (!) 155/50 -- -- 75 -- 94 % --   25 1525 -- 98.3  F (36.8  C) Oral -- 20 -- --       Temp:  [97.6  F (36.4  C)-98.8  F (37.1  C)] 98.8  F (37.1  C)  Pulse:  [68-84] 72  Resp:  [19-24] 20  BP: (137-165)/(45-63) 137/54  SpO2:  [93 %-98 %] 96 %    Temperatures:  Current - Temp: 98.8  F (37.1  C); Max - Temp  Av.2  F (36.8  C)  Min: 97.6  F (36.4  C)  Max: 98.8  F (37.1  C)  Respiration range: Resp  Av.8  Min: 19  Max: 24  Pulse range: Pulse  Av.7  Min: 68  Max: 84  Blood pressure range: Systolic (24hrs), Av , Min:137 , Max:165   ; Diastolic (24hrs), Av, Min:45, Max:63    Pulse oximetry range: SpO2  Av.2 %  Min: 93 %  Max: 98 %    I/O last 3 completed shifts:  In: 275 [NG/GT:150]  Out: -       Intake/Output Summary (Last 24 hours) at 2025 1423  Last data filed at 2025 1200  Gross per 24 hour   Intake 4427.21 ml   Output --   Net 4427.21 ml     Alert, confused  LAF with needles in place.  LLE AKA         Wt Readings from Last 4 Encounters:   25 95.8 kg (211 lb 3.2 oz)   24 101.1 kg (222 lb 14.2 oz)   24 101.1 kg (222 lb 14.2 oz)   24 101.1 kg (222 lb 14.2 oz)          Data:          Lab Results   Component  Value Date     02/19/2025     02/18/2025     02/17/2025     04/17/2021     04/09/2021     11/18/2020    Lab Results   Component Value Date    CHLORIDE 93 02/19/2025    CHLORIDE 96 02/18/2025    CHLORIDE 88 02/17/2025    CHLORIDE 106 11/24/2021    CHLORIDE 109 11/23/2021    CHLORIDE 110 11/22/2021    CHLORIDE 105 04/17/2021    CHLORIDE 101 04/09/2021    CHLORIDE 106 11/18/2020    Lab Results   Component Value Date    BUN 77.8 02/19/2025    BUN 48.9 02/18/2025    BUN 96.2 02/17/2025    BUN 37 11/24/2021    BUN 69 11/23/2021    BUN 64 11/22/2021    BUN 68 04/17/2021    BUN 53 04/09/2021    BUN 44 11/18/2020      Lab Results   Component Value Date    POTASSIUM 3.9 02/19/2025    POTASSIUM 3.6 02/18/2025    POTASSIUM 4.7 02/17/2025    POTASSIUM 4.7 02/02/2022    POTASSIUM 3.8 11/24/2021    POTASSIUM 6.6 11/23/2021    POTASSIUM 4.2 04/17/2021    POTASSIUM 3.8 04/16/2021    POTASSIUM 3.9 04/09/2021    Lab Results   Component Value Date    CO2 28 02/19/2025    CO2 29 02/18/2025    CO2 22 02/17/2025    CO2 31 11/24/2021    CO2 23 11/23/2021    CO2 24 11/22/2021    CO2 23 04/17/2021    CO2 22 04/09/2021    CO2 29 11/18/2020    Lab Results   Component Value Date    CR 4.36 02/19/2025    CR 3.14 02/18/2025    CR 4.70 02/17/2025    CR 5.98 04/17/2021    CR 4.35 04/16/2021    CR 5.28 04/09/2021        Recent Labs   Lab Test 02/19/25  0536 02/18/25  0447 02/17/25  0544   WBC 5.9 4.2 5.1   HGB 7.6* 7.6* 8.3*   HCT 24.2* 24.5* 26.1*   MCV 97 98 97    222 212     Recent Labs   Lab Test 02/05/25 2004 02/02/25  0542 01/26/25  1135   AST 17 12 21   ALT 24 22 61*   ALKPHOS 75 55 49   BILITOTAL 0.3 0.2 0.2       Recent Labs   Lab Test 02/17/25  0544 02/16/25  0529 02/15/25  0842   MAG 2.2 2.1 2.2     Recent Labs   Lab Test 02/17/25  0544 02/12/25  0523 02/11/25  0507   PHOS 3.4 4.8* 3.6     Recent Labs   Lab Test 02/19/25  0536 02/18/25  0447 02/17/25  0544   JODY 10.2 10.0 10.1       Lab Results    Component Value Date    JODY 10.2 02/19/2025     Lab Results   Component Value Date    WBC 5.9 02/19/2025    HGB 7.6 (L) 02/19/2025    HCT 24.2 (L) 02/19/2025    MCV 97 02/19/2025     02/19/2025     Lab Results   Component Value Date     (L) 02/19/2025    POTASSIUM 3.9 02/19/2025    CHLORIDE 93 (L) 02/19/2025    CO2 28 02/19/2025     (H) 02/19/2025     Lab Results   Component Value Date    BUN 77.8 (H) 02/19/2025    CR 4.36 (H) 02/19/2025     Lab Results   Component Value Date    MAG 2.2 02/17/2025     Lab Results   Component Value Date    PHOS 3.4 02/17/2025       Creatinine   Date Value Ref Range Status   02/19/2025 4.36 (H) 0.51 - 0.95 mg/dL Final   02/18/2025 3.14 (H) 0.51 - 0.95 mg/dL Final   02/17/2025 4.70 (H) 0.51 - 0.95 mg/dL Final   02/16/2025 3.89 (H) 0.51 - 0.95 mg/dL Final   02/15/2025 2.92 (H) 0.51 - 0.95 mg/dL Final   02/14/2025 4.22 (H) 0.51 - 0.95 mg/dL Final   04/17/2021 5.98 (H) 0.52 - 1.04 mg/dL Final   04/16/2021 4.35 (H) 0.52 - 1.04 mg/dL Final   04/09/2021 5.28 (H) 0.52 - 1.04 mg/dL Final   11/18/2020 4.23 (H) 0.52 - 1.04 mg/dL Final   11/16/2020 5.08 (H) 0.52 - 1.04 mg/dL Final   09/25/2020 6.87 (H) 0.52 - 1.04 mg/dL Final       Attestation:  I have reviewed today's vital signs, notes, medications, labs and imaging.  Seen on dialysis.      Braden Oneill MD

## 2025-02-19 NOTE — PROGRESS NOTES
[facility]    Medicine Progress Note - Hospitalist Service    Date of Admission:  1/8/2025    Assessment & Plan   Supriya Herr is a 74 year old female with PMHx significant for ESRD on HD, CHF, COPD, poor mobility (Lt AKA, Obesity, WC bound), DM type II, hypertension.  She was brought to the ER by EMS for evaluation of shortness of breath, diagnosed with influenza A and admitted on 1/8/25       Hospital course summary  Patient was admitted, subsequently intubated for worsening respiratory failure and remained on mechanical ventilator 1/9 - 1/18.  She was reintubated and remained on mechanical ventilator 1/20 - 1/25.  Was treated with Tamiflu, IV antibiotic, steroid, has completed courses.  Hospital course complicated by A-fib RVR.  Was on amiodarone drip with eventual transition to p.o. Also required diltiazem  for rate control.  Required BiPAP--HFNC--intermittent BiPAP use.  Ongoing tube feeding and modified diet per SLP.  Delirium managed with Seroquel.  Concern for recurrent pneumonia, hospital-acquired on 2/1, restarted on antibiotics.  ID consulted, recommended discontinuing antibiotics, respiratory failure likely related to fluid overload.     Patient developed symptomatic bradycardia on 2/5/25 triggering a RRT. She was placed on dopamine drip and transferred back to the ICU. Bradycardia was likely due to rate controlling medications. Cardiology consulted with adjustment of medications. Patient has remained in NSR without recurrence of bradycardia on amio drip. While in the ICU, patient required dialysis on pressors, now transitioned to midodrine . Hypoxic and hypercarbic resp failure is much improved with dialysis.      Patient transferred back to hospitalist service on 2/8/25. Recurrent issues with atrial fib with RVR.     Acute hypoxic respiratory failure-improving  Influenza A pneumonia- resolved  Acute COPD exacerbation- resolved  Hospital-acquired pneumonia  Pharyngeal  edema  Initial presentation with shortness of breath, diagnosed influenza A.  *1/20 bronchoscopy and BAL: culture grew actinomyces  *CT chest showed RML infiltrate versus atelectasis but no sign of actinomycosis, intensivist felt this was likely colonization.    *Pneumonia treated with cefepime and vancomycin, then Levaquin. Completed course 1/26.  *Flu and COPD exacerbation was treated with IV steroids and tamiflu.  *2/1 had increased O2 needs, concern for new infection. repeat blood cx --negative. CT chest showed possible right lower lobe pneumonia and ID was consulted and patient was again started on vancomycin and Levaquin, then subsequently discontinued as respiratory failure was determined to be due to fluid overload as she improved with dialysis.  - Continue budesonide 1 Mg twice daily, ipratropium/xopenex 4 times daily  - weaning off O2, only on 1-2LPM at bedtime.     Recurrent atrial fibrillation with RVR  Acute on chronic CHFpEF  Hypertension  Shock, due to sedation-resolved  Symptomatic bradycardia-Resolved  PTA amlodipine 5 Mg BID, Coreg 25 Mg BID and Lasix.  *1/21/25 TTE: LVEF 60%, no pericardial effusion, no significant valvular disease, diastolic function indeterminate.  No WMA.  *2/2/25 had symptomatic bradycardia due to rate controlling medications requiring RRT. Placed on dopamine gtt, coreg and diltiazem were held. Her heart went back into normal sinus and only amiodarone 200mg daily was continued at that point.  *2/10/25: developed atrial fib with RVR, amiodarone gtt restarted and PO dose increased from daily to BID.  *2/12/25: restarted amlodipine and oral hydralazine for uptrending BPs  *2/15/25: recurrent atrial fib with RVR, RRT called, amiodarone gtt restarted.  -Cardiology has followed on/off due to recurrent atrial fib and bradycardia.   - Intermittent needs for amiodarone gtt, currently on amiodarone oral 200mg BID.  -initial plan for PPM on 2/18; now discontinued per EP. No indication  for PPM at this time.   - A-fib to be managed with digoxin  - Continue with atorvastatin 20 mg daily and Lasix 80 mg daily (hold on dialysis days)  - Eliquis 5mg BID, on hold for g-tube initially--now on hold for PPM 2/18   -heparin gtt intermittently (held at 2am 2/18 for PPM)  - amlodipine 5mg BID, hydralazine 75mg q8h     Encephalopathy likely infectious and metabolic-improved  Anxiety  *1/20/25 CT head: negative for acute intracranial process. Volume loss and chronic microvascular ischemic changes noted. Suspected some baseline cognitive impairment.    *2/10/25: CT head: no acute intracranial process.   *2/10/25 EEG: showed moderate generalized slowing and triphasic waves which commonly seen in chronic kidney disease.   -Of note has had jerking movements during her hospital stay  -Neurology evaluated and recommended vitamins  - stable since 2/11/25: AOx2-3, fluctuating mental status  -Continue Seroquel 50mg qhs  -Continue PTA Zoloft and gabapentin.  -PT, OT recommend TCU     Diabetes mellitus, type 2  HbA1c 6.5% on 1/17/25. PTA Lantus 18u qd and NovoLog 5u TIDWM  -Lantus 15 units BID- HELD FOR NPO 2/18  -medium resistance sliding scale insulin q4h  -Hypoglycemia protocol     ESRD on HD , wasTue-Thu-Sat, now MWF  Anemia of chronic disease  Hyperkalemia-resolved  -Nephrology following, currently MWF dialysis  -Hemoglobin 7-8, at baseline.  -Gets EPO with hemodialysis.  -Continue vitamin D3.    -hold Renvela while npo as it can't be crushed  -Nephrology resumed Lasix on nondialysis days on 2/3.    -continue midodrine daily MWF with dialysis  -Lokelma discontinued on 2/10     Constipation  -Noted on CT 2/2  -Continue the bowel regimen      Dysphagia  Suspect due to recurrent intubation. Did well with video swallow study on 1/31/25, diet advanced.  But again was made n.p.o. on 2/11  *2/16 ENT evaluated with flexible scope: mild edema of arytenoids. No candida infection of pharynx of larynx. No findings to explain  "dysphagia  *2/17 G tube placed with IR  -nutrition following  -tube feeds resumed 4pm 2/17, holding at midnight for PPM.     Likely cerumen impaction- resolved  2/11 reported ear pressure and some ringing  - improved with debrox drops added 2/12, finished course 2/16. Ears clear when evaluated by ENT on 2/16        Diet: Room Service  Adult Formula Drip Feeding: Continuous [employer] Renal Support; Gastrostomy; Goal Rate: 65; mL/hr; From: 4:00 PM; To: 6:00 AM; run x14 hours nightly for total volume 910 mL (orders updated 2/18 to reflect new enteral access)    DVT Prophylaxis: heparin gtt  Bradshaw Catheter: Not present  Lines: None     Cardiac Monitoring: ACTIVE order. Indication: Tachyarrhythmias, acute (48 hours)  Code Status: Full Code      Clinically Significant Risk Factors         # Hyponatremia: Lowest Na = 134 mmol/L in last 2 days, will monitor as appropriate  # Hypochloremia: Lowest Cl = 93 mmol/L in last 2 days, will monitor as appropriate      # Hypoalbuminemia: Lowest albumin = 2.8 g/dL at 1/13/2025  5:14 AM, will monitor as appropriate       # Hypertension: Noted on problem list  # Chronic heart failure with preserved ejection fraction: heart failure noted on problem list and last echo with EF >50%    # Acute Hypercapnic Respiratory Failure: based on venous blood gas results.  Continue supplemental oxygen and ventilatory support as indicated.        # DMII: A1C = 6.5 % (Ref range: <5.7 %) within past 6 months   # Obesity: Estimated body mass index is 33.07 kg/m  as calculated from the following:    Height as of this encounter: 1.702 m (5' 7.01\").    Weight as of this encounter: 95.8 kg (211 lb 3.2 oz).        # Financial/Environmental Concerns: none         Social Drivers of Health    Tobacco Use: Medium Risk (9/11/2024)    Patient History     Smoking Tobacco Use: Former     Smokeless Tobacco Use: Never   Physical Activity: Insufficiently Active (7/19/2024)    Exercise Vital Sign     Days of Exercise per " Week: 1 day     Minutes of Exercise per Session: 10 min   Social Connections: Unknown (7/19/2024)    Social Connection and Isolation Panel [NHANES]     Frequency of Social Gatherings with Friends and Family: More than three times a week          Disposition Plan     Medically Ready for Discharge: Anticipated in 5+ Days  Await PPM, recovery, med adjustment then eventual discharge.           Vivian Wood MD  Hospitalist Service  [facility]  Securely message with [employer] (more info)  Text page via [employer] Paging/Directory   ______________________________________________________________________    Interval History   Patient seen during dialysis, complaining of left cheek pain.   No other complaints at this time.     Physical Exam   Vital Signs: Temp: 97.7  F (36.5  C) Temp src: Oral BP: (!) 148/58 Pulse: 77   Resp: 20 SpO2: 98 % O2 Device: Nasal cannula Oxygen Delivery: 2 LPM  Weight: 211 lbs 3.21 oz    General Appearance: Well appearing for stated age.  Respiratory: CTAB, no rales or ronchi  Cardiovascular: S1, S2 normal, no murmurs  GI: non-tender on palpation, BS present      Medical Decision Making       50 MINUTES SPENT BY ME on the date of service doing chart review, history, exam, documentation & further activities per the note.      Data     I have personally reviewed the following data over the past 24 hrs:    5.9  \   7.6 (L)   / 228     134 (L) 93 (L) 77.8 (H) /  191 (H)   3.9 28 4.36 (H) \       Imaging results reviewed over the past 24 hrs:   No results found for this or any previous visit (from the past 24 hours).

## 2025-02-19 NOTE — PROGRESS NOTES
Care Management Follow Up    Length of Stay (days): 42    Expected Discharge Date: 02/22/2025     Concerns to be Addressed: other (see comments) (elevated risk)     Patient plan of care discussed at interdisciplinary rounds: Yes    Anticipated Discharge Disposition: Skilled Nursing Facility              Anticipated Discharge Services: None  Anticipated Discharge DME: None    Patient/family educated on Medicare website which has current facility and service quality ratings: no  Education Provided on the Discharge Plan: No  Patient/Family in Agreement with the Plan: yes    Referrals Placed by CM/SW:    Private pay costs discussed:  NA    Discussed  Partnership in Safe Discharge Planning  document with patient/family: No     Handoff Completed: No, handoff not indicated or clinically appropriate    Additional Information:  SW reviewed pending TCU locations and previous declined locations due to NG feeding tubes and resent referrals to the following locations since pt is now on G-tube - [facility] and [facility].     Next Steps: Secure TCU, Complete PAS, Med readiness, Transport    Rafia Ugalde, BSW

## 2025-02-19 NOTE — PLAN OF CARE
Shift Summary [id number]:    VSS on 2L O2 NC. Oriented x4, intermittent confusion. Pain managed w/ meds (see MAR). LS dim, DEVRIES. PO suction PRN. Tele SR, denies CP. Anuric. LUE fistula WDL. 1 BM this shift, +BS and passing flatus. TF running at 65ml/hr (goal rate). 30ml FWF q4h. B,139,149. Up A2 lift. Repo q2h. Consulted teams: Neph, cards. Discharge tbd. Care plan updated.     See flowsheet for full assessment. See MAR for meds given.    Yahir Oneill, RN  7:36 PM    Goal Outcome Evaluation:      Plan of Care Reviewed With: patient, child    Overall Patient Progress: improving    Outcome Evaluation: IMC status continued. G-tube WDL, dresing CDI. Remained in SR all shift.    Problem: Enteral Nutrition  Goal: Absence of Aspiration Signs and Symptoms  Outcome: Progressing    Problem: Dysrhythmia  Goal: Normalized Cardiac Rhythm  Outcome: Progressing  Intervention: Monitor and Manage Cardiac Rhythm Effect

## 2025-02-19 NOTE — PROGRESS NOTES
EP Progress Note          Assessment and Plan:   Supriya Herr is a 74 year old female with PMHx significant for ESRD on HD, CHF, COPD, poor mobility (Lt AKA, Obesity, WC bound), DM type II, hypertension. She was brought to the ER by EMS for evaluation of shortness of breath, diagnosed with influenza A (resolved) and admitted on 1/8/25.  Patient developed A-fib with RVR with symptomatic bradycardia.  Had an RRT on 2/5 on a combination of amiodarone, carvedilol, and diltiazem.  She converted on 2/10 during dialysis after IV amiodarone was reloaded.  EP was consulted to assist with arrhythmia management.      Atrial fibrillation with RVR with conversion pauses noted during admission.  Sinus rhythm 80's  Currently on amiodarone 200 mg twice daily.    PTA Eliquis 5 mg bid      2. End-stage renal disease on dialysis  Dialysis scheduled for today    3.  Chronic anemia  Hemoglobin 7.6 this morning    EP signing off. Stay on amiodarone bid dosing x 1 month then 200 mg every day thereafter. EP OLVIN order placed to see us in 3-4 weeks with an EKG and amiodarone follow up  Please call with any questions or concerns.      Ramila Mayo NP, APRN CNP  Beeper [phone]              Interval History:   Pt tired but answering questions appropriately. Feeling good this am. Tele sinus in the 80's.        Medications:     Current Facility-Administered Medications   Medication Dose Route Frequency Provider Last Rate Last Admin    - MEDICATION INSTRUCTIONS for Dialysis Patients -   Does not apply See Admin Instructions Braden Montana MD        amiodarone (PACERONE) tablet 200 mg  200 mg Oral BID Carly Faye,    200 mg at 02/18/25 2144    amLODIPine (NORVASC) tablet 5 mg  5 mg Oral BID Johanne Crowell MD   5 mg at 02/18/25 1842    apixaban ANTICOAGULANT (ELIQUIS) tablet 5 mg  5 mg Oral or Feeding Tube BID Don Trammell MD   5 mg at 02/18/25 2144    atorvastatin (LIPITOR) tablet 20 mg  20 mg Oral or Feeding  Tube QPM Denver Shipman MD   20 mg at 02/18/25 2144    B and C vitamin Complex with folic acid (NEPHRONEX) liquid 5 mL  5 mL Per Feeding Tube Daily Denver Shipman MD   5 mL at 02/18/25 0942    budesonide (PULMICORT) neb solution 1 mg  1 mg Nebulization BID Denver Shipman MD   1 mg at 02/19/25 0716    cetirizine (zyrTEC) tablet 5 mg  5 mg Oral or Feeding Tube At Bedtime Denver Shipman MD   5 mg at 02/18/25 2144    digoxin (LANOXIN) half-tab 62.5 mcg  62.5 mcg Oral Daily Kamryn Selby PA-C        epoetin candy-epbx (RETACRIT) injection 10,000 Units  10,000 Units Intravenous Once in dialysis/CRRT Alysia Roman MD        furosemide (LASIX) tablet 80 mg  80 mg Oral or Feeding Tube Daily Enu-Vivian Samaniego MD   80 mg at 02/18/25 0942    hydrALAZINE (APRESOLINE) tablet 75 mg  75 mg Oral Q8H [facility] Carly Faye, DO   75 mg at 02/19/25 0636    insulin aspart (NovoLOG) injection (RAPID ACTING)  1-7 Units Subcutaneous Q4H Carly Faye, DO   3 Units at 02/19/25 0644    [Held by provider] insulin glargine (LANTUS PEN) injection 15 Units  15 Units Subcutaneous BID Denver Shipman MD   15 Units at 02/16/25 2148    ipratropium (ATROVENT) 0.02 % neb solution 0.5 mg  0.5 mg Nebulization 4x daily Johanne Crowell MD   0.5 mg at 02/19/25 0716    levalbuterol (XOPENEX) neb solution 1.25 mg  1.25 mg Nebulization 4x daily Johanne Crowlel MD   1.25 mg at 02/19/25 0716    miconazole (MICATIN) 2 % powder   Topical BID Denver Shipman MD   Given at 02/18/25 2157    midodrine (PROAMATINE) tablet 5 mg  5 mg Oral Once per day on Monday Wednesday Friday Nina-Vivian Samaniego MD   5 mg at 02/10/25 0917    pantoprazole (PROTONIX) 2 mg/mL suspension 40 mg  40 mg Per Feeding Tube Denver Avery MD   40 mg at 02/18/25 0942    Or    pantoprazole (PROTONIX) IV push injection 40 mg  40 mg Intravenous Denver Avery MD   40 mg at 02/15/25 0921    polyethylene glycol (MIRALAX) Packet  "17 g  17 g Oral or Feeding Tube Daily Liset Romero MD   17 g at 25 0840    Prosource TF20 ENfit Compatibl EN LIQD (PROSOURCE TF20) packet 60 mL  1 packet Per Feeding Tube Daily Truman Moreland MD   60 mL at 25 0942    QUEtiapine (SEROquel) tablet 50 mg  50 mg Oral or Feeding Tube At Bedtime Cheo Watt MD   50 mg at 25 2143    sennosides (SENOKOT) syrup 10 mL  10 mL Oral or Feeding Tube BID Liset Romero MD   10 mL at 25 214    sertraline (ZOLOFT) tablet 75 mg  75 mg Oral or Feeding Tube QPM Denver Shipman MD   75 mg at 25 2143    sodium chloride (PF) 0.9% PF flush 3 mL  3 mL Intracatheter Q8H Celine Plata APRN CNP   3 mL at 25 215    tacrolimus (PROTOPIC) 0.1 % ointment   Topical BID Denver Shipman MD   Given at 25 215    vitamin D3 (CHOLECALCIFEROL) tablet 50 mcg  50 mcg Oral or Feeding Tube Daily Denver Shipman MD   50 mcg at 25 0942             Review of Systems: If done, described below  The Review of Systems is negative other than noted in the HPI             Physical Exam:   Blood pressure (!) 161/53, pulse 78, temperature 97.6  F (36.4  C), temperature source Oral, resp. rate 24, height 1.702 m (5' 7.01\"), weight 95.8 kg (211 lb 3.2 oz), SpO2 97%, not currently breastfeeding.      Vital Sign Ranges  Temperature Temp  Av.1  F (36.7  C)  Min: 97.6  F (36.4  C)  Max: 98.4  F (36.9  C)   Blood pressure Systolic (24hrs), Av , Min:141 , Max:161        Diastolic (24hrs), Av, Min:45, Max:63      Pulse Pulse  Av.8  Min: 73  Max: 82   Respirations Resp  Av.2  Min: 18  Max: 24   Pulse oximetry SpO2  Av.9 %  Min: 93 %  Max: 98 %         Intake/Output Summary (Last 24 hours) at 2025 0748  Last data filed at 2025 1849  Gross per 24 hour   Intake 275 ml   Output --   Net 275 ml       Constitutional:   in no apparent distress     Lungs:   symmetric, clear to auscultation     Cardiovascular:   regular " rate and rhythm, normal S1 and S2, and no murmur noted     Extremities and Back:   trace edema              Data:     Lab Results   Component Value Date    WBC 5.9 02/19/2025    HGB 7.6 (L) 02/19/2025    HCT 24.2 (L) 02/19/2025     02/19/2025     (L) 02/19/2025    POTASSIUM 3.9 02/19/2025    CHLORIDE 93 (L) 02/19/2025    CO2 28 02/19/2025    BUN 77.8 (H) 02/19/2025    CR 4.36 (H) 02/19/2025     (H) 02/19/2025     (H) 06/03/2024    DD 1.03 (H) 08/19/2021    NTBNPI 41,365 (H) 02/02/2025    TROPI <0.012 01/02/2014    AST 17 02/05/2025    ALT 24 02/05/2025    ALKPHOS 75 02/05/2025    BILITOTAL 0.3 02/05/2025    INR 1.57 (H) 02/14/2025

## 2025-02-20 VITALS
BODY MASS INDEX: 32.46 KG/M2 | HEIGHT: 67 IN | DIASTOLIC BLOOD PRESSURE: 39 MMHG | HEART RATE: 70 BPM | SYSTOLIC BLOOD PRESSURE: 126 MMHG | OXYGEN SATURATION: 99 % | RESPIRATION RATE: 16 BRPM | TEMPERATURE: 97.9 F | WEIGHT: 206.79 LBS

## 2025-02-20 LAB
GLUCOSE BLDC GLUCOMTR-MCNC: 112 MG/DL (ref 70–99)
GLUCOSE BLDC GLUCOMTR-MCNC: 128 MG/DL (ref 70–99)
GLUCOSE BLDC GLUCOMTR-MCNC: 149 MG/DL (ref 70–99)
GLUCOSE BLDC GLUCOMTR-MCNC: 177 MG/DL (ref 70–99)
GLUCOSE BLDC GLUCOMTR-MCNC: 206 MG/DL (ref 70–99)
GLUCOSE BLDC GLUCOMTR-MCNC: 251 MG/DL (ref 70–99)

## 2025-02-20 PROCEDURE — 250N000013 HC RX MED GY IP 250 OP 250 PS 637: Performed by: HOSPITALIST

## 2025-02-20 PROCEDURE — 250N000009 HC RX 250: Performed by: HOSPITALIST

## 2025-02-20 PROCEDURE — 250N000013 HC RX MED GY IP 250 OP 250 PS 637: Performed by: INTERNAL MEDICINE

## 2025-02-20 PROCEDURE — 999N000157 HC STATISTIC RCP TIME EA 10 MIN

## 2025-02-20 PROCEDURE — 94640 AIRWAY INHALATION TREATMENT: CPT | Mod: 76

## 2025-02-20 PROCEDURE — 250N000013 HC RX MED GY IP 250 OP 250 PS 637: Performed by: PHYSICIAN ASSISTANT

## 2025-02-20 PROCEDURE — 250N000011 HC RX IP 250 OP 636: Performed by: HOSPITALIST

## 2025-02-20 PROCEDURE — 94640 AIRWAY INHALATION TREATMENT: CPT

## 2025-02-20 PROCEDURE — 99232 SBSQ HOSP IP/OBS MODERATE 35: CPT | Performed by: HOSPITALIST

## 2025-02-20 PROCEDURE — 120N000013 HC R&B IMCU

## 2025-02-20 RX ADMIN — ATORVASTATIN CALCIUM 20 MG: 20 TABLET, FILM COATED ORAL at 21:25

## 2025-02-20 RX ADMIN — IPRATROPIUM BROMIDE 0.5 MG: 0.5 SOLUTION RESPIRATORY (INHALATION) at 15:31

## 2025-02-20 RX ADMIN — FUROSEMIDE 80 MG: 40 TABLET ORAL at 08:54

## 2025-02-20 RX ADMIN — TACROLIMUS: 1 OINTMENT TOPICAL at 21:25

## 2025-02-20 RX ADMIN — MICONAZOLE NITRATE: 2 POWDER TOPICAL at 08:55

## 2025-02-20 RX ADMIN — AMIODARONE HYDROCHLORIDE 200 MG: 200 TABLET ORAL at 08:54

## 2025-02-20 RX ADMIN — AMLODIPINE BESYLATE 10 MG: 10 TABLET ORAL at 17:48

## 2025-02-20 RX ADMIN — DIGOXIN 62.5 MCG: 125 TABLET ORAL at 09:01

## 2025-02-20 RX ADMIN — ACETAMINOPHEN 650 MG: 325 TABLET, FILM COATED ORAL at 17:48

## 2025-02-20 RX ADMIN — SERTRALINE HYDROCHLORIDE 75 MG: 50 TABLET ORAL at 21:25

## 2025-02-20 RX ADMIN — IPRATROPIUM BROMIDE 0.5 MG: 0.5 SOLUTION RESPIRATORY (INHALATION) at 19:18

## 2025-02-20 RX ADMIN — PANTOPRAZOLE SODIUM 40 MG: 40 INJECTION, POWDER, FOR SOLUTION INTRAVENOUS at 06:12

## 2025-02-20 RX ADMIN — LEVALBUTEROL HYDROCHLORIDE 1.25 MG: 1.25 SOLUTION RESPIRATORY (INHALATION) at 19:18

## 2025-02-20 RX ADMIN — MICONAZOLE NITRATE: 2 POWDER TOPICAL at 21:25

## 2025-02-20 RX ADMIN — TACROLIMUS: 1 OINTMENT TOPICAL at 08:55

## 2025-02-20 RX ADMIN — HYDRALAZINE HYDROCHLORIDE 75 MG: 50 TABLET ORAL at 21:25

## 2025-02-20 RX ADMIN — HYDRALAZINE HYDROCHLORIDE 75 MG: 50 TABLET ORAL at 06:12

## 2025-02-20 RX ADMIN — ONDANSETRON 4 MG: 2 INJECTION, SOLUTION INTRAMUSCULAR; INTRAVENOUS at 18:03

## 2025-02-20 RX ADMIN — CETIRIZINE HYDROCHLORIDE 5 MG: 5 TABLET ORAL at 21:25

## 2025-02-20 RX ADMIN — METOPROLOL TARTRATE 2.5 MG: 5 INJECTION INTRAVENOUS at 16:15

## 2025-02-20 RX ADMIN — Medication 60 ML: at 09:53

## 2025-02-20 RX ADMIN — LEVALBUTEROL HYDROCHLORIDE 1.25 MG: 1.25 SOLUTION RESPIRATORY (INHALATION) at 15:31

## 2025-02-20 RX ADMIN — Medication 50 MCG: at 08:53

## 2025-02-20 RX ADMIN — BUDESONIDE 1 MG: 1 INHALANT ORAL at 19:18

## 2025-02-20 RX ADMIN — AMIODARONE HYDROCHLORIDE 200 MG: 200 TABLET ORAL at 21:25

## 2025-02-20 RX ADMIN — APIXABAN 5 MG: 5 TABLET, FILM COATED ORAL at 21:25

## 2025-02-20 RX ADMIN — APIXABAN 5 MG: 5 TABLET, FILM COATED ORAL at 08:54

## 2025-02-20 RX ADMIN — LEVALBUTEROL HYDROCHLORIDE 1.25 MG: 1.25 SOLUTION RESPIRATORY (INHALATION) at 07:05

## 2025-02-20 RX ADMIN — HYDRALAZINE HYDROCHLORIDE 75 MG: 50 TABLET ORAL at 14:38

## 2025-02-20 RX ADMIN — QUETIAPINE FUMARATE 50 MG: 50 TABLET ORAL at 21:25

## 2025-02-20 RX ADMIN — AMLODIPINE BESYLATE 10 MG: 10 TABLET ORAL at 08:54

## 2025-02-20 RX ADMIN — GUAIFENESIN AND DEXTROMETHORPHAN 10 ML: 100; 10 SYRUP ORAL at 17:48

## 2025-02-20 RX ADMIN — Medication 5 ML: at 09:01

## 2025-02-20 RX ADMIN — IPRATROPIUM BROMIDE 0.5 MG: 0.5 SOLUTION RESPIRATORY (INHALATION) at 07:05

## 2025-02-20 ASSESSMENT — ACTIVITIES OF DAILY LIVING (ADL)
ADLS_ACUITY_SCORE: 83
ADLS_ACUITY_SCORE: 79
ADLS_ACUITY_SCORE: 81
ADLS_ACUITY_SCORE: 83
ADLS_ACUITY_SCORE: 82
ADLS_ACUITY_SCORE: 79
ADLS_ACUITY_SCORE: 81
ADLS_ACUITY_SCORE: 83
ADLS_ACUITY_SCORE: 81
ADLS_ACUITY_SCORE: 84
ADLS_ACUITY_SCORE: 83
ADLS_ACUITY_SCORE: 83
ADLS_ACUITY_SCORE: 87
ADLS_ACUITY_SCORE: 81
ADLS_ACUITY_SCORE: 80
ADLS_ACUITY_SCORE: 82
ADLS_ACUITY_SCORE: 83
ADLS_ACUITY_SCORE: 82
ADLS_ACUITY_SCORE: 82
ADLS_ACUITY_SCORE: 83
ADLS_ACUITY_SCORE: 81

## 2025-02-20 NOTE — PROGRESS NOTES
Care Management Follow Up    Length of Stay (days): 43    Expected Discharge Date: 02/22/2025     Concerns to be Addressed: other (see comments) (elevated risk)     Patient plan of care discussed at interdisciplinary rounds: Yes    Anticipated Discharge Disposition: Skilled Nursing Facility              Anticipated Discharge Services: None  Anticipated Discharge DME: None    Patient/family educated on Medicare website which has current facility and service quality ratings: no  Education Provided on the Discharge Plan: No  Patient/Family in Agreement with the Plan: yes    Referrals Placed by CM/SW:    Private pay costs discussed: Not applicable    Discussed  Partnership in Safe Discharge Planning  document with patient/family: No     Handoff Completed: No, handoff not indicated or clinically appropriate    Additional Information:  HIGINIO called [facility] and left a vm requesting a call back to follow up on pending referral.    HIGINIO called [facility] and left a vm requesting a call back bed.    Next Steps: Secure bed/medical readiness    TOMER Beverly    [facility],

## 2025-02-20 NOTE — PLAN OF CARE
"Patient Name: Geronimo  MRN: [id number]  Date of Admission: 1/8/2025  Reason for Admission: Acute respiratory failure with hypoxia   Level of Care: [facility]    Vitals:   BP Readings from Last 1 Encounters:   02/20/25 127/43     Pulse Readings from Last 1 Encounters:   02/20/25 76     Wt Readings from Last 1 Encounters:   02/20/25 93.8 kg (206 lb 12.7 oz)     Ht Readings from Last 1 Encounters:   02/04/25 1.702 m (5' 7.01\")     Estimated body mass index is 32.38 kg/m  as calculated from the following:    Height as of this encounter: 1.702 m (5' 7.01\").    Weight as of this encounter: 93.8 kg (206 lb 12.7 oz).  Temp Readings from Last 1 Encounters:   02/20/25 99  F (37.2  C) (Axillary)       Pain: Pain goal 0/10 Pain Rating 10/10 Effective pain medication/regimen tylenol    CV Surgery Patient: No    Assessment    Resp: 2L NC   Telemetry: SR  Neuro: AOX4  GI/: 1 BM on shift,  no urine output (HD), NPO, tube feeds nocturnal   Skin/Wounds: wound to sacrum/coccyx   Lines/Drains: 1 PIV, patient pulled out 2 IV's overnight   Activity: AX2 turn and repo, patient was refusing some of the repo's   Sleep: slept between cares   Abnormal Labs: refused her morning labs     Aggression Stop Light: Green          Patient Care Plan: Plan wean 02 as tolerated, dialysis MWF        "

## 2025-02-20 NOTE — PROGRESS NOTES
[facility]    Medicine Progress Note - Hospitalist Service    Date of Admission:  1/8/2025    Assessment & Plan   Supriya Herr is a 74 year old female with PMHx significant for ESRD on HD, CHF, COPD, poor mobility (Lt AKA, Obesity, WC bound), DM type II, hypertension.  She was brought to the ER by EMS for evaluation of shortness of breath, diagnosed with influenza A and admitted on 1/8/25       Hospital course summary  Patient was admitted, subsequently intubated for worsening respiratory failure and remained on mechanical ventilator 1/9 - 1/18.  She was reintubated and remained on mechanical ventilator 1/20 - 1/25.  Was treated with Tamiflu, IV antibiotic, steroid, has completed courses.  Hospital course complicated by A-fib RVR.  Was on amiodarone drip with eventual transition to p.o. Also required diltiazem  for rate control.  Required BiPAP--HFNC--intermittent BiPAP use.  Ongoing tube feeding and modified diet per SLP.  Delirium managed with Seroquel.  Concern for recurrent pneumonia, hospital-acquired on 2/1, restarted on antibiotics.  ID consulted, recommended discontinuing antibiotics, respiratory failure likely related to fluid overload.     Patient developed symptomatic bradycardia on 2/5/25 triggering a RRT. She was placed on dopamine drip and transferred back to the ICU. Bradycardia was likely due to rate controlling medications. Cardiology consulted with adjustment of medications. Patient has remained in NSR without recurrence of bradycardia on amio drip. While in the ICU, patient required dialysis on pressors, now transitioned to midodrine . Hypoxic and hypercarbic resp failure is much improved with dialysis.      Patient transferred back to hospitalist service on 2/8/25. Recurrent issues with atrial fib with RVR.     Acute hypoxic respiratory failure-improving  Influenza A pneumonia- resolved  Acute COPD exacerbation- resolved  Hospital-acquired pneumonia  Pharyngeal  edema  Initial presentation with shortness of breath, diagnosed influenza A.  *1/20 bronchoscopy and BAL: culture grew actinomyces  *CT chest showed RML infiltrate versus atelectasis but no sign of actinomycosis, intensivist felt this was likely colonization.    *Pneumonia treated with cefepime and vancomycin, then Levaquin. Completed course 1/26.  *Flu and COPD exacerbation was treated with IV steroids and tamiflu.  *2/1 had increased O2 needs, concern for new infection. repeat blood cx --negative. CT chest showed possible right lower lobe pneumonia and ID was consulted and patient was again started on vancomycin and Levaquin, then subsequently discontinued as respiratory failure was determined to be due to fluid overload as she improved with dialysis.  - Continue budesonide 1 Mg twice daily, ipratropium/xopenex 4 times daily  - weaning off O2, only on 1-2LPM at bedtime.     Recurrent atrial fibrillation with RVR  Acute on chronic CHFpEF  Hypertension  Shock, due to sedation-resolved  Symptomatic bradycardia-Resolved  PTA amlodipine 5 Mg BID, Coreg 25 Mg BID and Lasix.  *1/21/25 TTE: LVEF 60%, no pericardial effusion, no significant valvular disease, diastolic function indeterminate.  No WMA.  *2/2/25 had symptomatic bradycardia due to rate controlling medications requiring RRT. Placed on dopamine gtt, coreg and diltiazem were held. Her heart went back into normal sinus and only amiodarone 200mg daily was continued at that point.  *2/10/25: developed atrial fib with RVR, amiodarone gtt restarted and PO dose increased from daily to BID.  *2/12/25: restarted amlodipine and oral hydralazine for uptrending BPs  *2/15/25: recurrent atrial fib with RVR, RRT called, amiodarone gtt restarted.  -Cardiology has followed on/off due to recurrent atrial fib and bradycardia.   - Intermittent needs for amiodarone gtt, currently on amiodarone oral 200mg BID.  -initial plan for PPM on 2/18; now discontinued per EP. No indication  for PPM at this time.   - A-fib to be managed with digoxin  - Continue with atorvastatin 20 mg daily and Lasix 80 mg daily (hold on dialysis days)  - Eliquis 5mg BID, on hold for g-tube initially--now on hold for PPM 2/18   -heparin gtt intermittently (held at 2am 2/18 for PPM)  - amlodipine 5mg BID, hydralazine 75mg q8h     Encephalopathy likely infectious and metabolic-improved  Anxiety  *1/20/25 CT head: negative for acute intracranial process. Volume loss and chronic microvascular ischemic changes noted. Suspected some baseline cognitive impairment.    *2/10/25: CT head: no acute intracranial process.   *2/10/25 EEG: showed moderate generalized slowing and triphasic waves which commonly seen in chronic kidney disease.   -Of note has had jerking movements during her hospital stay  -Neurology evaluated and recommended vitamins  - stable since 2/11/25: AOx2-3, fluctuating mental status  -Continue Seroquel 50mg qhs  -Continue PTA Zoloft and gabapentin.  -PT, OT recommend TCU     Diabetes mellitus, type 2  HbA1c 6.5% on 1/17/25. PTA Lantus 18u qd and NovoLog 5u TIDWM  -Lantus 15 units BID to be resumed on 2/21. Iniitally held for procedure   -medium resistance sliding scale insulin q4h  -Hypoglycemia protocol     ESRD on HD , wasTue-Thu-Sat, now MWF  Anemia of chronic disease  Hyperkalemia-resolved  -Nephrology following, currently MWF dialysis  -Hemoglobin 7-8, at baseline.  -Gets EPO with hemodialysis.  -Continue vitamin D3.    -hold Renvela while npo as it can't be crushed  -Nephrology resumed Lasix on nondialysis days on 2/3.    -continue midodrine daily MWF with dialysis  -Lokelma discontinued on 2/10     Constipation  -Noted on CT 2/2  -Continue the bowel regimen      Dysphagia  Suspect due to recurrent intubation. Did well with video swallow study on 1/31/25, diet advanced.  But again was made n.p.o. on 2/11  *2/16 ENT evaluated with flexible scope: mild edema of arytenoids. No candida infection of pharynx of  "larynx. No findings to explain dysphagia  *2/17 G tube placed with IR  -nutrition following  -tube feeds resumed 4pm 2/17, holding at midnight for PPM.     Likely cerumen impaction- resolved  2/11 reported ear pressure and some ringing  - improved with debrox drops added 2/12, finished course 2/16. Ears clear when evaluated by ENT on 2/16        Diet: Room Service  Adult Formula Drip Feeding: Continuous [employer] Renal Support; Gastrostomy; Goal Rate: 65; mL/hr; From: 4:00 PM; To: 6:00 AM; run x14 hours nightly for total volume 910 mL (orders updated 2/18 to reflect new enteral access)    DVT Prophylaxis: heparin gtt  Bradshaw Catheter: Not present  Lines: None     Cardiac Monitoring: ACTIVE order. Indication: Tachyarrhythmias, acute (48 hours)  Code Status: Full Code      Clinically Significant Risk Factors         # Hyponatremia: Lowest Na = 134 mmol/L in last 2 days, will monitor as appropriate  # Hypochloremia: Lowest Cl = 93 mmol/L in last 2 days, will monitor as appropriate      # Hypoalbuminemia: Lowest albumin = 2.8 g/dL at 1/13/2025  5:14 AM, will monitor as appropriate       # Hypertension: Noted on problem list  # Chronic heart failure with preserved ejection fraction: heart failure noted on problem list and last echo with EF >50%    # Acute Hypercapnic Respiratory Failure: based on venous blood gas results.  Continue supplemental oxygen and ventilatory support as indicated.        # DMII: A1C = 6.5 % (Ref range: <5.7 %) within past 6 months   # Obesity: Estimated body mass index is 32.38 kg/m  as calculated from the following:    Height as of this encounter: 1.702 m (5' 7.01\").    Weight as of this encounter: 93.8 kg (206 lb 12.7 oz).        # Financial/Environmental Concerns: none         Social Drivers of Health    Tobacco Use: Medium Risk (9/11/2024)    Patient History     Smoking Tobacco Use: Former     Smokeless Tobacco Use: Never   Physical Activity: Insufficiently Active (7/19/2024)    Exercise " Vital Sign     Days of Exercise per Week: 1 day     Minutes of Exercise per Session: 10 min   Social Connections: Unknown (7/19/2024)    Social Connection and Isolation Panel [NHANES]     Frequency of Social Gatherings with Friends and Family: More than three times a week          Disposition Plan     Medically Ready for Discharge: Anticipated in 5+ Days    Medically ready for discharge when TCU available.            Vivian Wood MD  Hospitalist Service  [facility]  Securely message with [employer] (more info)  Text page via [employer] Paging/Directory   ______________________________________________________________________    Interval History   No acute overnight events  Resting comfortably, denies any pain  Medically ready for discharge when TCU bed available.       Physical Exam   Vital Signs: Temp: 98.5  F (36.9  C) Temp src: Axillary BP: 134/69 Pulse: 100   Resp: 20 SpO2: 96 % O2 Device: Nasal cannula Oxygen Delivery: 2 LPM  Weight: 206 lbs 12.66 oz    General Appearance: Well appearing for stated age.  Respiratory: CTAB, no rales or ronchi  Cardiovascular: S1, S2 normal, no murmurs  GI: non-tender on palpation, BS present      Medical Decision Making       50 MINUTES SPENT BY ME on the date of service doing chart review, history, exam, documentation & further activities per the note.      Data         Imaging results reviewed over the past 24 hrs:   No results found for this or any previous visit (from the past 24 hours).

## 2025-02-20 NOTE — PLAN OF CARE
"Neuro: intact    CV: tele SR     Resp: LS clear in upper lobes diminished in bases. On 1L NC     GI: BS active, BM today, TF started at 17:30 (late due to HD)    : anuric on HD, ran for 3 hours - removed 1.5 L    Skin: LUE fistula, PEG with ab binder    Activity: assist of 2 + lift    Additional: PRN tylenol for PEG incisional pain, afebrile, daughter updated at bedside, plan for transition to LTACH    ---------------------------------------------------------------------------------------------------------    Goal Outcome Evaluation:    Problem: Adult Inpatient Plan of Care  Goal: Plan of Care Review  Description: The Plan of Care Review/Shift note should be completed every shift.  The Outcome Evaluation is a brief statement about your assessment that the patient is improving, declining, or no change.  This information will be displayed automatically on your shift  note.  Outcome: Progressing  Goal: Patient-Specific Goal (Individualized)  Description: You can add care plan individualizations to a care plan. Examples of Individualization might be:  \"Parent requests to be called daily at 9am for status\", \"I have a hard time hearing out of my right ear\", or \"Do not touch me to wake me up as it startles  me\".  Outcome: Progressing  Goal: Absence of Hospital-Acquired Illness or Injury  Outcome: Progressing  Intervention: Identify and Manage Fall Risk  Recent Flowsheet Documentation  Taken 2/19/2025 1800 by Karlene Esquivel RN  Safety Promotion/Fall Prevention:   safety round/check completed   activity supervised   assistive device/personal items within reach   clutter free environment maintained   increased rounding and observation   increase visualization of patient   lighting adjusted   mobility aid in reach   nonskid shoes/slippers when out of bed   patient and family education   room door open   room near nurse's station   room organization consistent   supervised activity   treat reversible contributory " factors   treat underlying cause  Taken 2/19/2025 1200 by Karlene Esquivel RN  Safety Promotion/Fall Prevention:   safety round/check completed   activity supervised   assistive device/personal items within reach   clutter free environment maintained   increased rounding and observation   increase visualization of patient   lighting adjusted   mobility aid in reach   nonskid shoes/slippers when out of bed   patient and family education   room door open   room near nurse's station   room organization consistent   supervised activity   treat reversible contributory factors   treat underlying cause  Taken 2/19/2025 0800 by Karlene Esquivel RN  Safety Promotion/Fall Prevention:   safety round/check completed   activity supervised   assistive device/personal items within reach   clutter free environment maintained   increased rounding and observation   increase visualization of patient   lighting adjusted   mobility aid in reach   nonskid shoes/slippers when out of bed   patient and family education   room door open   room near nurse's station   room organization consistent   supervised activity   treat reversible contributory factors   treat underlying cause  Intervention: Prevent Skin Injury  Recent Flowsheet Documentation  Taken 2/19/2025 1800 by Karlene Esquivel RN  Body Position:   turned   right   heels elevated  Skin Protection:   adhesive use limited   incontinence pads utilized   protective footwear used   pulse oximeter probe site changed   silicone foam dressing in place   skin to device areas padded   skin to skin areas padded   skin sealant/moisture barrier applied   transparent dressing maintained   tubing/devices free from skin contact  Taken 2/19/2025 1717 by Karlene Esquivel RN  Body Position:   turned   right   heels elevated  Taken 2/19/2025 1200 by Karlene Esquivel RN  Body Position:   turned   right   heels elevated  Skin Protection:   adhesive use limited   incontinence pads utilized    protective footwear used   pulse oximeter probe site changed   silicone foam dressing in place   skin to device areas padded   skin to skin areas padded   skin sealant/moisture barrier applied   transparent dressing maintained   tubing/devices free from skin contact  Taken 2/19/2025 1000 by Karlene Esquivel RN  Body Position: (education provided)   refuses positioning   position maintained  Taken 2/19/2025 0800 by Karlene Esquivel RN  Body Position:   turned   right   heels elevated  Skin Protection:   adhesive use limited   incontinence pads utilized   protective footwear used   pulse oximeter probe site changed   silicone foam dressing in place   skin to device areas padded   skin to skin areas padded   skin sealant/moisture barrier applied   transparent dressing maintained   tubing/devices free from skin contact  Intervention: Prevent and Manage VTE (Venous Thromboembolism) Risk  Recent Flowsheet Documentation  Taken 2/19/2025 1800 by Karlene Esquivel RN  VTE Prevention/Management: SCDs off (sequential compression devices)  Taken 2/19/2025 1200 by Karlene Esquivel RN  VTE Prevention/Management: SCDs off (sequential compression devices)  Taken 2/19/2025 0800 by Karlene Esquivel RN  VTE Prevention/Management: SCDs off (sequential compression devices)  Intervention: Prevent Infection  Recent Flowsheet Documentation  Taken 2/19/2025 1800 by Karlene Esquivel RN  Infection Prevention:   hand hygiene promoted   personal protective equipment utilized   single patient room provided   cohorting utilized   environmental surveillance performed   equipment surfaces disinfected  Taken 2/19/2025 1200 by Karlene Esquivel RN  Infection Prevention:   hand hygiene promoted   personal protective equipment utilized   single patient room provided   cohorting utilized   environmental surveillance performed   equipment surfaces disinfected  Taken 2/19/2025 0800 by Karlene Esquivel RN  Infection Prevention:   hand hygiene  promoted   personal protective equipment utilized   single patient room provided   cohorting utilized   environmental surveillance performed   equipment surfaces disinfected  Goal: Optimal Comfort and Wellbeing  Outcome: Progressing  Intervention: Monitor Pain and Promote Comfort  Recent Flowsheet Documentation  Taken 2/19/2025 1717 by Karlene Esquivel RN  Pain Management Interventions: medication (see MAR)  Taken 2/19/2025 1532 by Karlene Esquivel RN  Pain Management Interventions:   medication (see MAR)   heat applied  Taken 2/19/2025 1219 by Karlene Esquivel RN  Pain Management Interventions: medication (see MAR)  Taken 2/19/2025 0800 by Karlene Esquivel RN  Pain Management Interventions: repositioned  Intervention: Provide Person-Centered Care  Recent Flowsheet Documentation  Taken 2/19/2025 1800 by Karlene Esquivel RN  Trust Relationship/Rapport:   choices provided   care explained   emotional support provided   empathic listening provided   questions answered   questions encouraged   reassurance provided   thoughts/feelings acknowledged  Taken 2/19/2025 1200 by Karlene Esquivel RN  Trust Relationship/Rapport:   choices provided   care explained   emotional support provided   empathic listening provided   questions answered   questions encouraged   reassurance provided   thoughts/feelings acknowledged  Taken 2/19/2025 0800 by Karlene Esquivel RN  Trust Relationship/Rapport:   choices provided   care explained   emotional support provided   empathic listening provided   questions answered   questions encouraged   reassurance provided   thoughts/feelings acknowledged  Goal: Readiness for Transition of Care  Outcome: Progressing     Problem: Fall Injury Risk  Goal: Absence of Fall and Fall-Related Injury  Outcome: Progressing  Intervention: Identify and Manage Contributors  Recent Flowsheet Documentation  Taken 2/19/2025 1800 by Karlene Esquivel RN  Medication Review/Management:   medications  reviewed   high-risk medications identified  Taken 2/19/2025 1200 by Karlene Esquivel RN  Medication Review/Management:   medications reviewed   high-risk medications identified  Taken 2/19/2025 0800 by Karlene Esquivel RN  Medication Review/Management:   medications reviewed   high-risk medications identified  Intervention: Promote Injury-Free Environment  Recent Flowsheet Documentation  Taken 2/19/2025 1800 by Karlene Esquivel RN  Safety Promotion/Fall Prevention:   safety round/check completed   activity supervised   assistive device/personal items within reach   clutter free environment maintained   increased rounding and observation   increase visualization of patient   lighting adjusted   mobility aid in reach   nonskid shoes/slippers when out of bed   patient and family education   room door open   room near nurse's station   room organization consistent   supervised activity   treat reversible contributory factors   treat underlying cause  Taken 2/19/2025 1200 by Karlene Esquivel RN  Safety Promotion/Fall Prevention:   safety round/check completed   activity supervised   assistive device/personal items within reach   clutter free environment maintained   increased rounding and observation   increase visualization of patient   lighting adjusted   mobility aid in reach   nonskid shoes/slippers when out of bed   patient and family education   room door open   room near nurse's station   room organization consistent   supervised activity   treat reversible contributory factors   treat underlying cause  Taken 2/19/2025 0800 by Karlene Esquivel RN  Safety Promotion/Fall Prevention:   safety round/check completed   activity supervised   assistive device/personal items within reach   clutter free environment maintained   increased rounding and observation   increase visualization of patient   lighting adjusted   mobility aid in reach   nonskid shoes/slippers when out of bed   patient and family education    room door open   room near nurse's station   room organization consistent   supervised activity   treat reversible contributory factors   treat underlying cause     Problem: Pain Acute  Goal: Optimal Pain Control and Function  Outcome: Progressing  Intervention: Optimize Psychosocial Wellbeing  Recent Flowsheet Documentation  Taken 2/19/2025 1800 by Karlene Esquivel RN  Supportive Measures:   active listening utilized   positive reinforcement provided   decision-making supported   problem-solving facilitated   relaxation techniques promoted   self-care encouraged   verbalization of feelings encouraged  Taken 2/19/2025 1200 by Karlene Esquivel RN  Supportive Measures:   active listening utilized   positive reinforcement provided   decision-making supported   problem-solving facilitated   relaxation techniques promoted   self-care encouraged   verbalization of feelings encouraged  Taken 2/19/2025 0800 by Karlene Esquivel RN  Supportive Measures:   active listening utilized   positive reinforcement provided   decision-making supported   problem-solving facilitated   relaxation techniques promoted   self-care encouraged   verbalization of feelings encouraged  Intervention: Develop Pain Management Plan  Recent Flowsheet Documentation  Taken 2/19/2025 1717 by Karlene Esquivel RN  Pain Management Interventions: medication (see MAR)  Taken 2/19/2025 1532 by Karlene Esquivel RN  Pain Management Interventions:   medication (see MAR)   heat applied  Taken 2/19/2025 1219 by Karlene Esquivel RN  Pain Management Interventions: medication (see MAR)  Taken 2/19/2025 0800 by Karlene Esquivel RN  Pain Management Interventions: repositioned  Intervention: Prevent or Manage Pain  Recent Flowsheet Documentation  Taken 2/19/2025 1800 by Karlene Esquivel RN  Sensory Stimulation Regulation: care clustered  Bowel Elimination Promotion: commode/bedpan at bedside  Medication Review/Management:   medications reviewed    high-risk medications identified  Taken 2/19/2025 1200 by Karlene Esquviel RN  Sensory Stimulation Regulation: care clustered  Bowel Elimination Promotion: commode/bedpan at bedside  Medication Review/Management:   medications reviewed   high-risk medications identified  Taken 2/19/2025 0800 by Karlene Esquivel RN  Sensory Stimulation Regulation: care clustered  Bowel Elimination Promotion: commode/bedpan at bedside  Medication Review/Management:   medications reviewed   high-risk medications identified     Problem: Gas Exchange Impaired  Goal: Optimal Gas Exchange  Outcome: Progressing  Intervention: Optimize Oxygenation and Ventilation  Recent Flowsheet Documentation  Taken 2/19/2025 1800 by Karlene Esquivel RN  Airway/Ventilation Management:   airway patency maintained   pulmonary hygiene promoted  Head of Bed (HOB) Positioning: HOB at 30-45 degrees  Taken 2/19/2025 1717 by Karlene Esquivel RN  Head of Bed (HOB) Positioning: HOB at 30-45 degrees  Taken 2/19/2025 1200 by Karlene Esquivel RN  Airway/Ventilation Management:   airway patency maintained   pulmonary hygiene promoted  Head of Bed (HOB) Positioning: HOB at 30-45 degrees  Taken 2/19/2025 0800 by Karlene Esquivel RN  Airway/Ventilation Management:   airway patency maintained   pulmonary hygiene promoted  Head of Bed (HOB) Positioning: HOB at 20-30 degrees     Problem: Risk for Delirium  Goal: Optimal Coping  Outcome: Progressing  Intervention: Optimize Psychosocial Adjustment to Delirium  Recent Flowsheet Documentation  Taken 2/19/2025 1800 by Karlene Esquivel RN  Supportive Measures:   active listening utilized   positive reinforcement provided   decision-making supported   problem-solving facilitated   relaxation techniques promoted   self-care encouraged   verbalization of feelings encouraged  Family/Support System Care:   presence promoted   involvement promoted   self-care encouraged   support provided  Taken 2/19/2025 1200 by Alvin  Karlene DAIGLE RN  Supportive Measures:   active listening utilized   positive reinforcement provided   decision-making supported   problem-solving facilitated   relaxation techniques promoted   self-care encouraged   verbalization of feelings encouraged  Family/Support System Care:   presence promoted   involvement promoted   self-care encouraged   support provided  Taken 2/19/2025 0800 by Karlene Esquivel RN  Supportive Measures:   active listening utilized   positive reinforcement provided   decision-making supported   problem-solving facilitated   relaxation techniques promoted   self-care encouraged   verbalization of feelings encouraged  Family/Support System Care:   presence promoted   involvement promoted   self-care encouraged   support provided  Goal: Improved Behavioral Control  Outcome: Progressing  Intervention: Prevent and Manage Agitation  Recent Flowsheet Documentation  Taken 2/19/2025 1800 by Karlene Esquivel RN  Environment Familiarity/Consistency: daily routine followed  Taken 2/19/2025 1200 by Karlene Esquivel RN  Environment Familiarity/Consistency: daily routine followed  Taken 2/19/2025 0800 by Karlene Esquivel RN  Environment Familiarity/Consistency: daily routine followed  Intervention: Minimize Safety Risk  Recent Flowsheet Documentation  Taken 2/19/2025 1800 by Karlene Esquivel RN  Enhanced Safety Measures:   room near unit station   assistive devices when indicated   pain management   patient/family teach back on injury risk   Pre/Post Op education on fall prevention   review medications for side effects with activity  Trust Relationship/Rapport:   choices provided   care explained   emotional support provided   empathic listening provided   questions answered   questions encouraged   reassurance provided   thoughts/feelings acknowledged  Taken 2/19/2025 1200 by Karlene Esquivel RN  Enhanced Safety Measures:   room near unit station   assistive devices when indicated   pain  management   patient/family teach back on injury risk   Pre/Post Op education on fall prevention   review medications for side effects with activity  Trust Relationship/Rapport:   choices provided   care explained   emotional support provided   empathic listening provided   questions answered   questions encouraged   reassurance provided   thoughts/feelings acknowledged  Taken 2/19/2025 0800 by Karlene Esquivel RN  Enhanced Safety Measures:   room near unit station   assistive devices when indicated   pain management   patient/family teach back on injury risk   Pre/Post Op education on fall prevention   review medications for side effects with activity  Trust Relationship/Rapport:   choices provided   care explained   emotional support provided   empathic listening provided   questions answered   questions encouraged   reassurance provided   thoughts/feelings acknowledged  Goal: Improved Attention and Thought Clarity  Outcome: Progressing  Intervention: Maximize Cognitive Function  Recent Flowsheet Documentation  Taken 2/19/2025 1800 by Karlene Esquivel RN  Sensory Stimulation Regulation: care clustered  Reorientation Measures:   calendar in view   clock in view   reorientation provided  Taken 2/19/2025 1200 by Karlene Esquivel RN  Sensory Stimulation Regulation: care clustered  Reorientation Measures:   calendar in view   clock in view   reorientation provided  Taken 2/19/2025 0800 by Karlene Esquivel RN  Sensory Stimulation Regulation: care clustered  Reorientation Measures:   calendar in view   clock in view   reorientation provided  Goal: Improved Sleep  Outcome: Progressing     Problem: Comorbidity Management  Goal: Maintenance of COPD Symptom Control  Outcome: Progressing  Intervention: Maintain COPD Symptom Control  Recent Flowsheet Documentation  Taken 2/19/2025 1800 by Karlene Esquivel RN  Breathing Techniques/Airway Clearance: deep/controlled cough encouraged  Medication Review/Management:    medications reviewed   high-risk medications identified  Taken 2/19/2025 1200 by Karlene Esquivel RN  Breathing Techniques/Airway Clearance: deep/controlled cough encouraged  Medication Review/Management:   medications reviewed   high-risk medications identified  Taken 2/19/2025 0800 by Karelne Esquivel RN  Breathing Techniques/Airway Clearance: deep/controlled cough encouraged  Medication Review/Management:   medications reviewed   high-risk medications identified  Goal: Blood Glucose Levels Within Targeted Range  Outcome: Progressing  Intervention: Monitor and Manage Glycemia  Recent Flowsheet Documentation  Taken 2/19/2025 1800 by Karlene Esquivel RN  Medication Review/Management:   medications reviewed   high-risk medications identified  Taken 2/19/2025 1200 by Karlene Esquivel RN  Medication Review/Management:   medications reviewed   high-risk medications identified  Taken 2/19/2025 0800 by Karlene Esquivel RN  Medication Review/Management:   medications reviewed   high-risk medications identified  Goal: Blood Pressure in Desired Range  Outcome: Progressing  Intervention: Maintain Blood Pressure Management  Recent Flowsheet Documentation  Taken 2/19/2025 1800 by Karlene Esquivel RN  Medication Review/Management:   medications reviewed   high-risk medications identified  Taken 2/19/2025 1200 by Karlene Esquivel RN  Medication Review/Management:   medications reviewed   high-risk medications identified  Taken 2/19/2025 0800 by Karlene Esquivel RN  Medication Review/Management:   medications reviewed   high-risk medications identified     Problem: Delirium  Goal: Optimal Coping  Outcome: Progressing  Intervention: Optimize Psychosocial Adjustment to Delirium  Recent Flowsheet Documentation  Taken 2/19/2025 1800 by Karlene Esquivel RN  Supportive Measures:   active listening utilized   positive reinforcement provided   decision-making supported   problem-solving facilitated   relaxation  techniques promoted   self-care encouraged   verbalization of feelings encouraged  Family/Support System Care:   presence promoted   involvement promoted   self-care encouraged   support provided  Taken 2/19/2025 1200 by Karlene Esquivel RN  Supportive Measures:   active listening utilized   positive reinforcement provided   decision-making supported   problem-solving facilitated   relaxation techniques promoted   self-care encouraged   verbalization of feelings encouraged  Family/Support System Care:   presence promoted   involvement promoted   self-care encouraged   support provided  Taken 2/19/2025 0800 by Karlene Esquivel RN  Supportive Measures:   active listening utilized   positive reinforcement provided   decision-making supported   problem-solving facilitated   relaxation techniques promoted   self-care encouraged   verbalization of feelings encouraged  Family/Support System Care:   presence promoted   involvement promoted   self-care encouraged   support provided  Goal: Improved Behavioral Control  Outcome: Progressing  Intervention: Prevent and Manage Agitation  Recent Flowsheet Documentation  Taken 2/19/2025 1800 by Karlene Esquivel RN  Environment Familiarity/Consistency: daily routine followed  Taken 2/19/2025 1200 by Karlene Esquivel RN  Environment Familiarity/Consistency: daily routine followed  Taken 2/19/2025 0800 by Karlene Esquivel RN  Environment Familiarity/Consistency: daily routine followed  Intervention: Minimize Safety Risk  Recent Flowsheet Documentation  Taken 2/19/2025 1800 by Karlene Esquivel RN  Enhanced Safety Measures:   room near unit station   assistive devices when indicated   pain management   patient/family teach back on injury risk   Pre/Post Op education on fall prevention   review medications for side effects with activity  Trust Relationship/Rapport:   choices provided   care explained   emotional support provided   empathic listening provided   questions  answered   questions encouraged   reassurance provided   thoughts/feelings acknowledged  Taken 2/19/2025 1200 by Karlene Esquivel RN  Enhanced Safety Measures:   room near unit station   assistive devices when indicated   pain management   patient/family teach back on injury risk   Pre/Post Op education on fall prevention   review medications for side effects with activity  Trust Relationship/Rapport:   choices provided   care explained   emotional support provided   empathic listening provided   questions answered   questions encouraged   reassurance provided   thoughts/feelings acknowledged  Taken 2/19/2025 0800 by Karlene Esquivel RN  Enhanced Safety Measures:   room near unit station   assistive devices when indicated   pain management   patient/family teach back on injury risk   Pre/Post Op education on fall prevention   review medications for side effects with activity  Trust Relationship/Rapport:   choices provided   care explained   emotional support provided   empathic listening provided   questions answered   questions encouraged   reassurance provided   thoughts/feelings acknowledged  Goal: Improved Attention and Thought Clarity  Outcome: Progressing  Intervention: Maximize Cognitive Function  Recent Flowsheet Documentation  Taken 2/19/2025 1800 by Karlene Esquivel RN  Sensory Stimulation Regulation: care clustered  Reorientation Measures:   calendar in view   clock in view   reorientation provided  Taken 2/19/2025 1200 by Karlene Esquivel RN  Sensory Stimulation Regulation: care clustered  Reorientation Measures:   calendar in view   clock in view   reorientation provided  Taken 2/19/2025 0800 by Karlene Esquivel RN  Sensory Stimulation Regulation: care clustered  Reorientation Measures:   calendar in view   clock in view   reorientation provided  Goal: Improved Sleep  Outcome: Progressing     Problem: Enteral Nutrition  Goal: Absence of Aspiration Signs and Symptoms  Outcome:  Progressing  Intervention: Minimize Aspiration Risk  Recent Flowsheet Documentation  Taken 2/19/2025 1800 by Karlene Esquivel RN  Enteral Feeding Safety:   placement verified by x-ray   placement checked  Oral Care: swabbed with sterile water  Head of Bed (HOB) Positioning: HOB at 30-45 degrees  Taken 2/19/2025 1717 by Karlene Esquivel RN  Oral Care: swabbed with sterile water  Head of Bed (HOB) Positioning: HOB at 30-45 degrees  Taken 2/19/2025 1200 by Karlene Esquivel RN  Enteral Feeding Safety:   placement verified by x-ray   placement checked  Oral Care: swabbed with sterile water  Head of Bed (HOB) Positioning: HOB at 30-45 degrees  Taken 2/19/2025 0800 by Karlene Esquivel RN  Enteral Feeding Safety:   placement verified by x-ray   placement checked  Oral Care: swabbed with sterile water  Head of Bed (HOB) Positioning: HOB at 20-30 degrees  Goal: Safe, Effective Therapy Delivery  Outcome: Progressing  Intervention: Prevent Feeding-Related Adverse Events  Recent Flowsheet Documentation  Taken 2/19/2025 1800 by Karlene Esquivel RN  Enteral Feeding Safety:   placement verified by x-ray   placement checked  Taken 2/19/2025 1200 by Karlene Esquivel RN  Enteral Feeding Safety:   placement verified by x-ray   placement checked  Taken 2/19/2025 0800 by Karlene Esquivel RN  Enteral Feeding Safety:   placement verified by x-ray   placement checked  Goal: Feeding Tolerance  Outcome: Progressing  Intervention: Prevent and Manage Feeding Intolerance  Recent Flowsheet Documentation  Taken 2/19/2025 1800 by Karlene Esquivel RN  Nutrition Support Management: weight trending reviewed  Taken 2/19/2025 1200 by Karlene Esquivel RN  Nutrition Support Management: weight trending reviewed  Taken 2/19/2025 0800 by Karlene Esquivel RN  Nutrition Support Management: weight trending reviewed     Problem: Skin Injury Risk Increased  Goal: Skin Health and Integrity  Outcome: Progressing  Intervention: Plan: Nurse Driven  Intervention: Positioning  Recent Flowsheet Documentation  Taken 2/19/2025 1800 by Karlene Esquivel RN  Plan: Positioning Interventions:   REPOSITION Left/Right (No supine) q2h   Use wedge positioners   HOB 30 degrees or less   OFF-LOAD HEELS with pillows  Taken 2/19/2025 1200 by Karlene Esquivel RN  Plan: Positioning Interventions:   REPOSITION Left/Right (No supine) q2h   Use wedge positioners   HOB 30 degrees or less   OFF-LOAD HEELS with pillows  Taken 2/19/2025 0800 by Karlene Esquivel RN  Plan: Positioning Interventions:   REPOSITION Left/Right (No supine) q2h   Use wedge positioners   HOB 30 degrees or less   OFF-LOAD HEELS with pillows  Intervention: Plan: Nurse Driven Intervention: Moisture Management  Recent Flowsheet Documentation  Taken 2/19/2025 1800 by Karlene Esquivel RN  Moisture Interventions:   No brief in bed   Incontinence pad   Perineal cleanser  Taken 2/19/2025 1200 by Karlene Esquivel RN  Moisture Interventions:   No brief in bed   Incontinence pad   Perineal cleanser  Taken 2/19/2025 0800 by Karlene Esquivel RN  Moisture Interventions:   No brief in bed   Incontinence pad   Perineal cleanser  Intervention: Plan: Nurse Driven Intervention: Friction and Shear  Recent Flowsheet Documentation  Taken 2/19/2025 1800 by Karlene Esquivel RN  Friction/Shear Interventions:   HOB 30 degrees or less   Silicone foam sacral dressing   Pad bony prominence (elbow pads, heel pads, ear protectors)   Assistive lifting device (portable/ceiling lift, etc.)   Lateral transfer device (hovermat, etc.)   Repositioning device (TAP system, etc.)  Taken 2/19/2025 1200 by Karlene Esquivel RN  Friction/Shear Interventions:   HOB 30 degrees or less   Silicone foam sacral dressing   Pad bony prominence (elbow pads, heel pads, ear protectors)   Assistive lifting device (portable/ceiling lift, etc.)   Lateral transfer device (hovermat, etc.)   Repositioning device (TAP system, etc.)  Taken 2/19/2025 0800 by  Karlene Esquivel RN  Friction/Shear Interventions:   HOB 30 degrees or less   Silicone foam sacral dressing   Pad bony prominence (elbow pads, heel pads, ear protectors)   Assistive lifting device (portable/ceiling lift, etc.)   Lateral transfer device (hovermat, etc.)   Repositioning device (TAP system, etc.)  Intervention: Optimize Skin Protection  Recent Flowsheet Documentation  Taken 2/19/2025 1800 by Karlene Esquivel RN  Pressure Reduction Techniques:   frequent weight shift encouraged   heels elevated off bed   positioned off wounds   pressure points protected   rest period provided between sit times   sit time limited to 2 hours   weight shift assistance provided  Pressure Reduction Devices:   foam padding utilized   heel offloading device utilized   positioning supports utilized  Skin Protection:   adhesive use limited   incontinence pads utilized   protective footwear used   pulse oximeter probe site changed   silicone foam dressing in place   skin to device areas padded   skin to skin areas padded   skin sealant/moisture barrier applied   transparent dressing maintained   tubing/devices free from skin contact  Activity Management: activity adjusted per tolerance  Head of Bed (HOB) Positioning: HOB at 30-45 degrees  Taken 2/19/2025 1717 by Karlene Esquivel RN  Head of Bed (HOB) Positioning: HOB at 30-45 degrees  Taken 2/19/2025 1200 by Karlene Esquivel RN  Pressure Reduction Techniques:   frequent weight shift encouraged   heels elevated off bed   positioned off wounds   pressure points protected   rest period provided between sit times   sit time limited to 2 hours   weight shift assistance provided  Pressure Reduction Devices:   foam padding utilized   heel offloading device utilized   positioning supports utilized  Skin Protection:   adhesive use limited   incontinence pads utilized   protective footwear used   pulse oximeter probe site changed   silicone foam dressing in place   skin to device  areas padded   skin to skin areas padded   skin sealant/moisture barrier applied   transparent dressing maintained   tubing/devices free from skin contact  Activity Management: activity adjusted per tolerance  Head of Bed (HOB) Positioning: HOB at 30-45 degrees  Taken 2/19/2025 0800 by Karlene Esquivel RN  Pressure Reduction Techniques:   frequent weight shift encouraged   heels elevated off bed   positioned off wounds   pressure points protected   rest period provided between sit times   sit time limited to 2 hours   weight shift assistance provided  Pressure Reduction Devices:   foam padding utilized   heel offloading device utilized   positioning supports utilized  Skin Protection:   adhesive use limited   incontinence pads utilized   protective footwear used   pulse oximeter probe site changed   silicone foam dressing in place   skin to device areas padded   skin to skin areas padded   skin sealant/moisture barrier applied   transparent dressing maintained   tubing/devices free from skin contact  Activity Management:   activity adjusted per tolerance   activity encouraged  Head of Bed (HOB) Positioning: HOB at 20-30 degrees     Problem: Dysrhythmia  Goal: Normalized Cardiac Rhythm  Outcome: Progressing  Intervention: Monitor and Manage Cardiac Rhythm Effect  Recent Flowsheet Documentation  Taken 2/19/2025 1800 by Karlene Esquivel RN  VTE Prevention/Management: SCDs off (sequential compression devices)  Taken 2/19/2025 1200 by Karlene Esquivel RN  VTE Prevention/Management: SCDs off (sequential compression devices)  Taken 2/19/2025 0800 by Karlene Esquivel RN  VTE Prevention/Management: SCDs off (sequential compression devices)

## 2025-02-21 LAB
ANION GAP SERPL CALCULATED.3IONS-SCNC: 12 MMOL/L (ref 7–15)
BASE EXCESS BLDV CALC-SCNC: 8.9 MMOL/L (ref -3–3)
BASOPHILS # BLD AUTO: 0 10E3/UL (ref 0–0.2)
BASOPHILS NFR BLD AUTO: 1 %
BUN SERPL-MCNC: 66.4 MG/DL (ref 8–23)
CALCIUM SERPL-MCNC: 10.3 MG/DL (ref 8.8–10.4)
CHLORIDE SERPL-SCNC: 97 MMOL/L (ref 98–107)
CREAT SERPL-MCNC: 4.02 MG/DL (ref 0.51–0.95)
EGFRCR SERPLBLD CKD-EPI 2021: 11 ML/MIN/1.73M2
EOSINOPHIL # BLD AUTO: 0.2 10E3/UL (ref 0–0.7)
EOSINOPHIL NFR BLD AUTO: 3 %
ERYTHROCYTE [DISTWIDTH] IN BLOOD BY AUTOMATED COUNT: 16.9 % (ref 10–15)
GLUCOSE BLDC GLUCOMTR-MCNC: 123 MG/DL (ref 70–99)
GLUCOSE BLDC GLUCOMTR-MCNC: 140 MG/DL (ref 70–99)
GLUCOSE BLDC GLUCOMTR-MCNC: 147 MG/DL (ref 70–99)
GLUCOSE BLDC GLUCOMTR-MCNC: 160 MG/DL (ref 70–99)
GLUCOSE BLDC GLUCOMTR-MCNC: 193 MG/DL (ref 70–99)
GLUCOSE BLDC GLUCOMTR-MCNC: 202 MG/DL (ref 70–99)
GLUCOSE SERPL-MCNC: 204 MG/DL (ref 70–99)
HCO3 BLDV-SCNC: 34 MMOL/L (ref 21–28)
HCO3 SERPL-SCNC: 28 MMOL/L (ref 22–29)
HCT VFR BLD AUTO: 23.8 % (ref 35–47)
HGB BLD-MCNC: 7.4 G/DL (ref 11.7–15.7)
IMM GRANULOCYTES # BLD: 0.1 10E3/UL
IMM GRANULOCYTES NFR BLD: 1 %
LYMPHOCYTES # BLD AUTO: 1.2 10E3/UL (ref 0.8–5.3)
LYMPHOCYTES NFR BLD AUTO: 18 %
MCH RBC QN AUTO: 30.8 PG (ref 26.5–33)
MCHC RBC AUTO-ENTMCNC: 31.1 G/DL (ref 31.5–36.5)
MCV RBC AUTO: 99 FL (ref 78–100)
MONOCYTES # BLD AUTO: 1 10E3/UL (ref 0–1.3)
MONOCYTES NFR BLD AUTO: 15 %
NEUTROPHILS # BLD AUTO: 4.1 10E3/UL (ref 1.6–8.3)
NEUTROPHILS NFR BLD AUTO: 62 %
NRBC # BLD AUTO: 0 10E3/UL
NRBC BLD AUTO-RTO: 0 /100
O2/TOTAL GAS SETTING VFR VENT: 20 %
OXYHGB MFR BLDV: 78 % (ref 70–75)
PCO2 BLDV: 50 MM HG (ref 40–50)
PH BLDV: 7.44 [PH] (ref 7.32–7.43)
PLATELET # BLD AUTO: 241 10E3/UL (ref 150–450)
PO2 BLDV: 43 MM HG (ref 25–47)
POTASSIUM SERPL-SCNC: 4.1 MMOL/L (ref 3.4–5.3)
RBC # BLD AUTO: 2.4 10E6/UL (ref 3.8–5.2)
SAO2 % BLDV: 79.6 % (ref 70–75)
SODIUM SERPL-SCNC: 137 MMOL/L (ref 135–145)
WBC # BLD AUTO: 6.6 10E3/UL (ref 4–11)

## 2025-02-21 PROCEDURE — 250N000013 HC RX MED GY IP 250 OP 250 PS 637: Performed by: HOSPITALIST

## 2025-02-21 PROCEDURE — 90937 HEMODIALYSIS REPEATED EVAL: CPT

## 2025-02-21 PROCEDURE — 250N000011 HC RX IP 250 OP 636: Performed by: INTERNAL MEDICINE

## 2025-02-21 PROCEDURE — 250N000013 HC RX MED GY IP 250 OP 250 PS 637: Performed by: INTERNAL MEDICINE

## 2025-02-21 PROCEDURE — 36415 COLL VENOUS BLD VENIPUNCTURE: CPT | Performed by: NURSE PRACTITIONER

## 2025-02-21 PROCEDURE — 85004 AUTOMATED DIFF WBC COUNT: CPT | Performed by: HOSPITALIST

## 2025-02-21 PROCEDURE — 634N000001 HC RX 634: Mod: JZ | Performed by: INTERNAL MEDICINE

## 2025-02-21 PROCEDURE — 36415 COLL VENOUS BLD VENIPUNCTURE: CPT | Performed by: HOSPITALIST

## 2025-02-21 PROCEDURE — 94640 AIRWAY INHALATION TREATMENT: CPT | Mod: 76

## 2025-02-21 PROCEDURE — 80048 BASIC METABOLIC PNL TOTAL CA: CPT | Performed by: HOSPITALIST

## 2025-02-21 PROCEDURE — 120N000001 HC R&B MED SURG/OB

## 2025-02-21 PROCEDURE — 999N000157 HC STATISTIC RCP TIME EA 10 MIN

## 2025-02-21 PROCEDURE — 258N000003 HC RX IP 258 OP 636: Performed by: INTERNAL MEDICINE

## 2025-02-21 PROCEDURE — 250N000009 HC RX 250: Performed by: HOSPITALIST

## 2025-02-21 PROCEDURE — 94640 AIRWAY INHALATION TREATMENT: CPT

## 2025-02-21 PROCEDURE — 85014 HEMATOCRIT: CPT | Performed by: HOSPITALIST

## 2025-02-21 PROCEDURE — 90935 HEMODIALYSIS ONE EVALUATION: CPT | Performed by: INTERNAL MEDICINE

## 2025-02-21 PROCEDURE — 99232 SBSQ HOSP IP/OBS MODERATE 35: CPT | Performed by: HOSPITALIST

## 2025-02-21 PROCEDURE — 82805 BLOOD GASES W/O2 SATURATION: CPT | Performed by: NURSE PRACTITIONER

## 2025-02-21 PROCEDURE — 250N000013 HC RX MED GY IP 250 OP 250 PS 637: Performed by: PHYSICIAN ASSISTANT

## 2025-02-21 RX ORDER — ALBUMIN (HUMAN) 12.5 G/50ML
50 SOLUTION INTRAVENOUS
Status: DISCONTINUED | OUTPATIENT
Start: 2025-02-21 | End: 2025-02-26

## 2025-02-21 RX ORDER — CINACALCET 30 MG/1
30 TABLET, FILM COATED ORAL
Status: DISCONTINUED | OUTPATIENT
Start: 2025-02-21 | End: 2025-02-26 | Stop reason: HOSPADM

## 2025-02-21 RX ORDER — HEPARIN SODIUM 1000 [USP'U]/ML
500 INJECTION, SOLUTION INTRAVENOUS; SUBCUTANEOUS CONTINUOUS
Status: DISCONTINUED | OUTPATIENT
Start: 2025-02-21 | End: 2025-02-26

## 2025-02-21 RX ORDER — CALCITRIOL 0.25 UG/1
0.5 CAPSULE, LIQUID FILLED ORAL
Status: DISCONTINUED | OUTPATIENT
Start: 2025-02-21 | End: 2025-02-24

## 2025-02-21 RX ADMIN — SODIUM CHLORIDE 250 ML: 0.9 INJECTION, SOLUTION INTRAVENOUS at 09:56

## 2025-02-21 RX ADMIN — SENNOSIDES 10 ML: 8.8 LIQUID ORAL at 12:41

## 2025-02-21 RX ADMIN — ATORVASTATIN CALCIUM 20 MG: 20 TABLET, FILM COATED ORAL at 21:08

## 2025-02-21 RX ADMIN — IPRATROPIUM BROMIDE 0.5 MG: 0.5 SOLUTION RESPIRATORY (INHALATION) at 07:14

## 2025-02-21 RX ADMIN — MIDODRINE HYDROCHLORIDE 5 MG: 5 TABLET ORAL at 08:01

## 2025-02-21 RX ADMIN — SODIUM CHLORIDE 200 ML: 0.9 INJECTION, SOLUTION INTRAVENOUS at 09:56

## 2025-02-21 RX ADMIN — IPRATROPIUM BROMIDE 0.5 MG: 0.5 SOLUTION RESPIRATORY (INHALATION) at 14:20

## 2025-02-21 RX ADMIN — AMIODARONE HYDROCHLORIDE 200 MG: 200 TABLET ORAL at 21:08

## 2025-02-21 RX ADMIN — EPOETIN ALFA-EPBX 4000 UNITS: 4000 INJECTION, SOLUTION INTRAVENOUS; SUBCUTANEOUS at 09:56

## 2025-02-21 RX ADMIN — LEVALBUTEROL HYDROCHLORIDE 1.25 MG: 1.25 SOLUTION RESPIRATORY (INHALATION) at 07:14

## 2025-02-21 RX ADMIN — TACROLIMUS: 1 OINTMENT TOPICAL at 21:09

## 2025-02-21 RX ADMIN — POLYETHYLENE GLYCOL 3350 17 G: 17 POWDER, FOR SOLUTION ORAL at 12:37

## 2025-02-21 RX ADMIN — INSULIN GLARGINE 15 UNITS: 100 INJECTION, SOLUTION SUBCUTANEOUS at 21:08

## 2025-02-21 RX ADMIN — HYDRALAZINE HYDROCHLORIDE 75 MG: 50 TABLET ORAL at 21:08

## 2025-02-21 RX ADMIN — HEPARIN SODIUM 500 UNITS/HR: 1000 INJECTION INTRAVENOUS; SUBCUTANEOUS at 09:56

## 2025-02-21 RX ADMIN — APIXABAN 5 MG: 5 TABLET, FILM COATED ORAL at 21:08

## 2025-02-21 RX ADMIN — HYDRALAZINE HYDROCHLORIDE 75 MG: 50 TABLET ORAL at 05:51

## 2025-02-21 RX ADMIN — SERTRALINE HYDROCHLORIDE 75 MG: 50 TABLET ORAL at 21:07

## 2025-02-21 RX ADMIN — HEPARIN SODIUM 500 UNITS: 1000 INJECTION INTRAVENOUS; SUBCUTANEOUS at 09:56

## 2025-02-21 RX ADMIN — CINACALCET 30 MG: 30 TABLET ORAL at 12:36

## 2025-02-21 RX ADMIN — HYDRALAZINE HYDROCHLORIDE 75 MG: 50 TABLET ORAL at 14:53

## 2025-02-21 RX ADMIN — Medication 50 MCG: at 08:00

## 2025-02-21 RX ADMIN — INSULIN GLARGINE 15 UNITS: 100 INJECTION, SOLUTION SUBCUTANEOUS at 10:02

## 2025-02-21 RX ADMIN — AMIODARONE HYDROCHLORIDE 200 MG: 200 TABLET ORAL at 08:01

## 2025-02-21 RX ADMIN — TACROLIMUS: 1 OINTMENT TOPICAL at 12:38

## 2025-02-21 RX ADMIN — QUETIAPINE FUMARATE 50 MG: 50 TABLET ORAL at 21:08

## 2025-02-21 RX ADMIN — Medication 60 ML: at 12:50

## 2025-02-21 RX ADMIN — CETIRIZINE HYDROCHLORIDE 5 MG: 5 TABLET ORAL at 21:08

## 2025-02-21 RX ADMIN — BUDESONIDE 1 MG: 1 INHALANT ORAL at 20:50

## 2025-02-21 RX ADMIN — PANTOPRAZOLE SODIUM 40 MG: 40 INJECTION, POWDER, FOR SOLUTION INTRAVENOUS at 06:00

## 2025-02-21 RX ADMIN — LEVALBUTEROL HYDROCHLORIDE 1.25 MG: 1.25 SOLUTION RESPIRATORY (INHALATION) at 14:20

## 2025-02-21 RX ADMIN — BUDESONIDE 1 MG: 1 INHALANT ORAL at 07:14

## 2025-02-21 RX ADMIN — LEVALBUTEROL HYDROCHLORIDE 1.25 MG: 1.25 SOLUTION RESPIRATORY (INHALATION) at 20:48

## 2025-02-21 RX ADMIN — ACETAMINOPHEN 650 MG: 325 TABLET, FILM COATED ORAL at 08:10

## 2025-02-21 RX ADMIN — AMLODIPINE BESYLATE 10 MG: 10 TABLET ORAL at 18:40

## 2025-02-21 RX ADMIN — IPRATROPIUM BROMIDE 0.5 MG: 0.5 SOLUTION RESPIRATORY (INHALATION) at 20:48

## 2025-02-21 RX ADMIN — AMLODIPINE BESYLATE 10 MG: 10 TABLET ORAL at 12:37

## 2025-02-21 RX ADMIN — DIGOXIN 62.5 MCG: 125 TABLET ORAL at 12:36

## 2025-02-21 RX ADMIN — APIXABAN 5 MG: 5 TABLET, FILM COATED ORAL at 08:00

## 2025-02-21 RX ADMIN — MICONAZOLE NITRATE: 2 POWDER TOPICAL at 21:09

## 2025-02-21 RX ADMIN — Medication 5 ML: at 08:00

## 2025-02-21 ASSESSMENT — ACTIVITIES OF DAILY LIVING (ADL)
ADLS_ACUITY_SCORE: 83
ADLS_ACUITY_SCORE: 83
ADLS_ACUITY_SCORE: 86
ADLS_ACUITY_SCORE: 83
ADLS_ACUITY_SCORE: 86
ADLS_ACUITY_SCORE: 86
ADLS_ACUITY_SCORE: 83
ADLS_ACUITY_SCORE: 83
ADLS_ACUITY_SCORE: 86
ADLS_ACUITY_SCORE: 83
ADLS_ACUITY_SCORE: 83
ADLS_ACUITY_SCORE: 86
ADLS_ACUITY_SCORE: 83
ADLS_ACUITY_SCORE: 86
ADLS_ACUITY_SCORE: 83

## 2025-02-21 NOTE — PROGRESS NOTES
[facility]  Hospitalist Progress Note        Chandu Rodriguez MD   02/21/2025        Interval History:        - Patient seen and examined in dialysis, denies any active complaints  -  following for disposition to TCU         Assessment and Plan:        Supriya Herr is a 74 year old female with PMHx significant for ESRD on HD, CHF, COPD, poor mobility (Lt AKA, Obesity, WC bound), DM type II, hypertension.  She was brought to the ER by EMS for evaluation of shortness of breath, diagnosed with influenza A and admitted on 1/8/25       Hospital course summary  She was intubated for worsening respiratory failure and remained on mechanical ventilator 1/9 - 1/18; reintubated (1/20 - 1/25).  Was treated with Tamiflu, IV antibiotic, steroid, has completed courses.  Hospital course complicated by A-fib RVR.  Was on amiodarone drip with eventual transition to p.o. Also required diltiazem  for rate control.  Required BiPAP--HFNC--intermittent BiPAP use.  Ongoing tube feeding and modified diet per SLP.  Delirium managed with Seroquel.  Concern for recurrent pneumonia, hospital-acquired on 2/1, restarted on antibiotics.  ID consulted, recommended discontinuing antibiotics, respiratory failure likely related to fluid overload.     Patient developed symptomatic bradycardia on 2/5/25 triggering a RRT. She was placed on dopamine drip and transferred back to the ICU. Bradycardia was likely due to rate controlling medications. Cardiology consulted with adjustment of medications. Patient has remained in NSR without recurrence of bradycardia on amio drip. While in the ICU, patient required dialysis on pressors, now transitioned to midodrine . Hypoxic and hypercarbic resp failure is much improved with dialysis.      Patient transferred back to hospitalist service on 2/8/25. Recurrent issues with atrial fib with RVR. Disposition now pending TCU placement.     Acute hypoxic respiratory failure requiring  mechanical ventilation  Influenza A pneumonia  Acute COPD exacerbation  Hospital-acquired pneumonia  Pharyngeal edema  - Initial presentation with shortness of breath, diagnosed influenza A; prolonged ICU course requiring mechanical ventilation as noted above.  *1/20 bronchoscopy and BAL: culture grew actinomyces  *CT chest showed RML infiltrate versus atelectasis but no sign of actinomycosis, intensivist felt this was likely colonization.    *Pneumonia treated with cefepime and vancomycin, then Levaquin. Completed course 1/26.  *Flu and COPD exacerbation was treated with IV steroids and tamiflu.  *2/1 had increased O2 needs, concern for new infection. repeat blood cx --negative. CT chest showed possible right lower lobe pneumonia and ID was consulted and patient was again started on vancomycin and Levaquin, then subsequently discontinued as respiratory failure was determined to be due to fluid overload as she improved with dialysis.  - Continue budesonide 1 Mg twice daily, ipratropium/xopenex 4 times daily  - weaning off O2, respiratory status stable on 2 L oxygen     Recurrent atrial fibrillation with RVR  Acute on chronic CHFpEF  Hypertension  Shock, due to sedation-resolved  Symptomatic bradycardia-Resolved  - PTA amlodipine 5 Mg BID, Coreg 25 Mg BID and Lasix.  *1/21/25 TTE: LVEF 60%, no pericardial effusion, no significant valvular disease, diastolic function indeterminate.  No WMA.  *2/2/25 had symptomatic bradycardia due to rate controlling medications requiring RRT. Placed on dopamine gtt, coreg and diltiazem were held. Her heart went back into normal sinus and only amiodarone 200mg daily was continued at that point.  *2/10/25: developed atrial fib with RVR, amiodarone gtt restarted and PO dose increased from daily to BID.  *2/12/25: restarted amlodipine and oral hydralazine for uptrending BPs  *2/15/25: recurrent atrial fib with RVR, RRT called, amiodarone gtt restarted.  - Cardiology has followed on/off  due to recurrent atrial fib and bradycardia.   - Intermittent needs for amiodarone gtt, currently on amiodarone oral 200mg BID.  - initial plan for PPM on 2/18; now discontinued per EP (signed off 2/19). No indication for PPM at this time.   - A-fib to be managed with digoxin  - Continue with atorvastatin 20 mg daily and Lasix 80 mg daily (hold on dialysis days)  - continue Eliquis 5mg BID  -continue amlodipine 5mg BID, hydralazine 75mg q8h, amiodarone 200 mg BID  -gets midodrine prior to dialysis     Encephalopathy likely infectious and metabolic-improved  Anxiety  *1/20/25 CT head: negative for acute intracranial process. Volume loss and chronic microvascular ischemic changes noted. Suspected some baseline cognitive impairment.    *2/10/25: CT head: no acute intracranial process.   *2/10/25 EEG: showed moderate generalized slowing and triphasic waves which commonly seen in chronic kidney disease.   -Of note has had jerking movements during her hospital stay  -Neurology evaluated and recommended vitamins  - stable since 2/11/25: AOx2-3, fluctuating mental status  -Continue Seroquel 50mg qhs  -Continue PTA Zoloft and gabapentin.  -PT, OT recommend TCU     Diabetes mellitus, type 2  HbA1c 6.5% on 1/17/25. PTA Lantus 18u qd and NovoLog 5u TIDWM  -Lantus 15 units BID resumed on 2/21. Iniitally held for procedure   -medium resistance sliding scale insulin q4h  -Hypoglycemia protocol     ESRD on HD , wasTue-Thu-Sat, now MWF  Anemia of chronic disease  Hyperkalemia-resolved  -Nephrology following, currently MWF dialysis  -Hemoglobin 7-8, at baseline.  -Gets EPO with hemodialysis.  -Continue vitamin D3.    -Nephrology resumed Lasix on nondialysis days on 2/3.    -continue midodrine daily MWF with dialysis  -Lokelma discontinued on 2/10     Constipation  -Noted on CT 2/2  -Continue the bowel regimen      Dysphagia  Suspect due to recurrent intubation. Did well with video swallow study on 1/31/25, diet advanced.  But again  "was made n.p.o. on 2/11  *2/16 ENT evaluated with flexible scope: mild edema of arytenoids. No candida infection of pharynx of larynx. No findings to explain dysphagia  *2/17 G tube placed with IR  -nutrition following for tube feeds      Likely cerumen impaction- resolved  2/11 reported ear pressure and some ringing  - improved with debrox drops added 2/12, finished course 2/16. Ears clear when evaluated by ENT on 2/16      DVT Prophylaxis: on Eliquis    Code Status: Full Code       Diet:   Room Service  Adult Formula Drip Feeding: [email or web address] Renal Support; Gastrostomy; Goal Rate: 65; mL/hr; From: 4:00 PM; To: 6:00 AM; run x14 hours nightly for total volume 910 mL (orders updated 2/18 to reflect new enteral access)      Disposition:   Medically Ready for Discharge: Ready Now pending TCU placement;  following for disposition    Can discontinue IMC status    Care plan discussed with patient and nursing       Clinically Significant Risk Factors          # Hypochloremia: Lowest Cl = 97 mmol/L in last 2 days, will monitor as appropriate      # Hypoalbuminemia: Lowest albumin = 2.8 g/dL at 1/13/2025  5:14 AM, will monitor as appropriate       # Hypertension: Noted on problem list  # Chronic heart failure with preserved ejection fraction: heart failure noted on problem list and last echo with EF >50%      # Acute Hypercapnic Respiratory Failure: based on venous blood gas results.  Continue supplemental oxygen and ventilatory support as indicated.          # DMII: A1C = 6.5 % (Ref range: <5.7 %) within past 6 months   # Obesity: Estimated body mass index is 32.38 kg/m  as calculated from the following:    Height as of this encounter: 1.702 m (5' 7.01\").    Weight as of this encounter: 93.8 kg (206 lb 12.7 oz).        # Financial/Environmental Concerns: none                            Physical Exam:      Blood pressure 138/47, pulse 72, temperature 97.6  F (36.4  C), temperature source Axillary, " "resp. rate 20, height 1.702 m (5' 7.01\"), weight 93.8 kg (206 lb 12.7 oz), SpO2 96%, not currently breastfeeding.  Vitals:    02/09/25 2300 02/18/25 1607 02/20/25 0425   Weight: 97.3 kg (214 lb 8.1 oz) 95.8 kg (211 lb 3.2 oz) 93.8 kg (206 lb 12.7 oz)     Vital Signs with Ranges  Temp:  [97.5  F (36.4  C)-98.6  F (37  C)] 97.6  F (36.4  C)  Pulse:  [] 72  Resp:  [16-20] 20  BP: (105-156)/(39-86) 138/47  SpO2:  [84 %-99 %] 96 %  I/O's Last 24 hours  I/O last 3 completed shifts:  In: 1126 [I.V.:6; NG/GT:210]  Out: -     Constitutional: Alert, awake and oriented; resting comfortably in no apparent distress       Oral cavity: Moist mucosa   Cardiovascular: Normal s1 s2, regular rate and rhythm, no murmur   Lungs: B/l clear to auscultation, no wheezes or crepitations   Abdomen: Soft, nt, nd, no guarding, rigidity or rebound; BS +   LE : No edema on right; left AKA status   Musculoskeletal/Neuro Power 5/5 in all extremities; No focal neurological deficits noted   Psychiatry: normal mood and affect                Medications:        Current Facility-Administered Medications   Medication Dose Route Frequency Provider Last Rate Last Admin    - MEDICATION INSTRUCTIONS for Dialysis Patients -   Does not apply See Admin Instructions Akosua Garcia, [facility]        amiodarone (PACERONE) tablet 200 mg  200 mg Oral BID Carly Faye DO   200 mg at 02/20/25 2125    amLODIPine (NORVASC) tablet 10 mg  10 mg Oral BID Braden Oneill MD   10 mg at 02/20/25 1748    apixaban ANTICOAGULANT (ELIQUIS) tablet 5 mg  5 mg Oral or Feeding Tube BID Don Trammell MD   5 mg at 02/20/25 2125    atorvastatin (LIPITOR) tablet 20 mg  20 mg Oral or Feeding Tube QPM Denver Shipman MD   20 mg at 02/20/25 2125    B and C vitamin Complex with folic acid (NEPHRONEX) liquid 5 mL  5 mL Per Feeding Tube Daily Denver Shipman MD   5 mL at 02/20/25 0901    budesonide (PULMICORT) neb solution 1 mg  1 mg Nebulization BID Umberto, " Denver DAIGLE MD   1 mg at 02/21/25 0714    cetirizine (zyrTEC) tablet 5 mg  5 mg Oral or Feeding Tube At Bedtime Denver Shipman MD   5 mg at 02/20/25 2125    digoxin (LANOXIN) half-tab 62.5 mcg  62.5 mcg Oral Daily Kamryn Selby PA-C   62.5 mcg at 02/20/25 0901    furosemide (LASIX) tablet 80 mg  80 mg Oral or Feeding Tube Daily Vivian Russell MD   80 mg at 02/20/25 0854    hydrALAZINE (APRESOLINE) tablet 75 mg  75 mg Oral Q8H VÍCTOR Carly Faye DO   75 mg at 02/21/25 0551    insulin aspart (NovoLOG) injection (RAPID ACTING)  1-7 Units Subcutaneous Q4H Carly Faye DO   2 Units at 02/21/25 0550    insulin glargine (LANTUS PEN) injection 15 Units  15 Units Subcutaneous BID Vivian Russell MD   15 Units at 02/16/25 2148    ipratropium (ATROVENT) 0.02 % neb solution 0.5 mg  0.5 mg Nebulization 4x daily Johanne Crowell MD   0.5 mg at 02/21/25 0714    levalbuterol (XOPENEX) neb solution 1.25 mg  1.25 mg Nebulization 4x daily Johanne Crowell MD   1.25 mg at 02/21/25 0714    miconazole (MICATIN) 2 % powder   Topical BID Denver Shipman MD   Given at 02/20/25 2125    midodrine (PROAMATINE) tablet 5 mg  5 mg Oral Once per day on Monday Wednesday Friday Vivian Russell MD   5 mg at 02/19/25 0830    pantoprazole (PROTONIX) 2 mg/mL suspension 40 mg  40 mg Per Feeding Tube QAM Denver Mays MD   40 mg at 02/18/25 0942    Or    pantoprazole (PROTONIX) IV push injection 40 mg  40 mg Intravenous QADenver Ellis MD   40 mg at 02/21/25 0600    polyethylene glycol (MIRALAX) Packet 17 g  17 g Oral or Feeding Tube Daily Liset Romero MD   17 g at 02/13/25 0840    Prosource TF20 ENfit Compatibl EN LIQD (PROSOURCE TF20) packet 60 mL  1 packet Per Feeding Tube Daily Truman Moreland MD   60 mL at 02/20/25 0953    QUEtiapine (SEROquel) tablet 50 mg  50 mg Oral or Feeding Tube At Bedtime Cheo Watt MD   50 mg at 02/20/25 212    sennosides (SENOKOT) syrup 10 mL   10 mL Oral or Feeding Tube BID Liset Romero MD   10 mL at 02/19/25 2122    sertraline (ZOLOFT) tablet 75 mg  75 mg Oral or Feeding Tube QPM Denver Shipman MD   75 mg at 02/20/25 2125    sodium chloride (PF) 0.9% PF flush 3 mL  3 mL Intracatheter Q8H BroklCeline APRN CNP   3 mL at 02/21/25 0553    tacrolimus (PROTOPIC) 0.1 % ointment   Topical BID Denver Shipman MD   Given at 02/20/25 2125    vitamin D3 (CHOLECALCIFEROL) tablet 50 mcg  50 mcg Oral or Feeding Tube Daily Denver Shipman MD   50 mcg at 02/20/25 0853     PRN Meds:   Current Facility-Administered Medications   Medication Dose Route Frequency Provider Last Rate Last Admin    acetaminophen (TYLENOL) tablet 650 mg  650 mg Oral Q4H PRN Denver Shipman MD   650 mg at 02/20/25 1748    Or    acetaminophen (TYLENOL) Suppository 650 mg  650 mg Rectal Q4H PRN Denver Shipman MD        albuterol (PROVENTIL HFA/VENTOLIN HFA) inhaler  2 puff Inhalation Q6H PRN Denver Shipman MD        benzocaine-menthol (CHLORASEPTIC) 6-10 MG lozenge 1 lozenge  1 lozenge Buccal Q1H PRN Carly Faye DO        bisacodyl (DULCOLAX) suppository 10 mg  10 mg Rectal Daily PRN Liset Romero MD        calcium carbonate (TUMS) chewable tablet 1,000 mg  1,000 mg Oral 4x Daily PRN Denver Shipman MD   1,000 mg at 02/05/25 1526    carboxymethylcellulose PF (REFRESH PLUS) 0.5 % ophthalmic solution 1 drop  1 drop Both Eyes Q1H PRN Denver Shipman MD        dextrose 10% infusion   Intravenous Continuous PRN Denver Shipman MD        glucose gel 15-30 g  15-30 g Oral Q15 Min PRN Denver Shipman MD        Or    dextrose 50 % injection 25-50 mL  25-50 mL Intravenous Q15 Min PRN Denver Shipman MD        Or    glucagon injection 1 mg  1 mg Subcutaneous Q15 Min PRN Denver Shipman MD        guaiFENesin-dextromethorphan (ROBITUSSIN DM) 100-10 MG/5ML syrup 10 mL  10 mL Oral Q4H PRN Carly Faye, DO   10 mL at 02/20/25 5578     hydrALAZINE (APRESOLINE) injection 10 mg  10 mg Intravenous Q6H PRN Carly Faye DO        HYDROmorphone (DILAUDID) injection 0.2 mg  0.2 mg Intravenous Q2H PRN Carly Faye DO   0.2 mg at 02/19/25 1532    ipratropium - albuterol 0.5 mg/2.5 mg/3 mL (DUONEB) neb solution 3 mL  3 mL Nebulization Q4H PRN Ted Proctor APRN CNP        lidocaine (LMX4) cream   Topical Q1H PRN Celine Plata APRN CNP        lidocaine (LMX4) cream   Topical Q1H PRN Denver Shipman MD        lidocaine 1 % 0.1-1 mL  0.1-1 mL Other Q1H PRN Celine Plata APRN CNP        lidocaine 1 % 0.1-1 mL  0.1-1 mL Other Q1H PRN Denver Shipman MD   1 mL at 02/14/25 1226    melatonin tablet 1 mg  1 mg Oral At Bedtime PRN Denver Shipman MD   1 mg at 02/11/25 2212    metoprolol (LOPRESSOR) injection 2.5-5 mg  2.5-5 mg Intravenous Q6H PRN Denver Shipman MD   2.5 mg at 02/20/25 1615    naloxone (NARCAN) injection 0.2 mg  0.2 mg Intravenous Q2 Min PRDenver Zuniga MD        Or    naloxone (NARCAN) injection 0.4 mg  0.4 mg Intravenous Q2 Min PRDenver Zuniga MD        Or    naloxone (NARCAN) injection 0.2 mg  0.2 mg Intramuscular Q2 Min PRDenver Zuniga MD        Or    naloxone (NARCAN) injection 0.4 mg  0.4 mg Intramuscular Q2 Min PRDenver Zuniga MD        No lozenges or gum should be given while patient on BIPAP/AVAPS/AVAPS AE   Does not apply Continuous PRN Denver Shipman MD        ondansetron (ZOFRAN ODT) ODT tab 4 mg  4 mg Oral Q6H PRN Denver Shipman MD        Or    ondansetron (ZOFRAN) injection 4 mg  4 mg Intravenous Q6H PRN Denver Shipman MD   4 mg at 02/20/25 1803    Patient may continue current oral medications   Does not apply Continuous PRN Denver Shipman MD        petrolatum-zinc oxide (SENSI-CARE) 49-15 % ointment   Topical Daily PRN Hugo Hector PA-C        phenol (CHLORASEPTIC) 1.4 % spray 1 mL  1 spray Mouth/Throat Q1H PRN Johanne Crowell MD   1 mL at  "02/11/25 1736    prochlorperazine (COMPAZINE) injection 5 mg  5 mg Intravenous Q6H PRN Eren Mandel MD   5 mg at 02/07/25 0813    senna-docusate (SENOKOT-S/PERICOLACE) 8.6-50 MG per tablet 1 tablet  1 tablet Oral BID PRN Denver Shipman MD   1 tablet at 01/27/25 0832    Or    senna-docusate (SENOKOT-S/PERICOLACE) 8.6-50 MG per tablet 2 tablet  2 tablet Oral BID PRN Denver Shipman MD        sodium chloride (PF) 0.9% PF flush 3 mL  3 mL Intracatheter q1 min prn Celine Plata APRN CNP        sodium chloride (PF) 0.9% PF flush 3 mL  3 mL Intracatheter q1 min prn Denver Shipman MD        sodium chloride 0.9% BOLUS 1-250 mL  1-250 mL Intravenous Q1H PRN Harry Barnes MD                Data:      All new lab and imaging data was reviewed.   Recent Labs   Lab Test 02/21/25  0530 02/19/25  0536 02/18/25  0447 02/15/25  0842 02/14/25  1421 06/03/24  1644 04/24/24  0913 08/16/21  1816 04/16/21  0800   WBC 6.6 5.9 4.2   < > 4.4   < > 6.3   < > 6.4   HGB 7.4* 7.6* 7.6*   < > 8.4*   < > 10.1*   < > 10.9*   MCV 99 97 98   < > 98   < > 101*   < > 98    228 222   < > 184   < > 139*   < > 194   INR  --   --   --   --  1.57*  --  1.11  --  1.14    < > = values in this interval not displayed.      Recent Labs   Lab Test 02/21/25  0546 02/21/25  0530 02/21/25  0020 02/19/25  0643 02/19/25  0536 02/18/25  1017 02/18/25  0447   NA  --  137  --   --  134*  --  138   POTASSIUM  --  4.1  --   --  3.9  --  3.6   CHLORIDE  --  97*  --   --  93*  --  96*   CO2  --  28  --   --  28  --  29   BUN  --  66.4*  --   --  77.8*  --  48.9*   CR  --  4.02*  --   --  4.36*  --  3.14*   ANIONGAP  --  12  --   --  13  --  13   JODY  --  10.3  --   --  10.2  --  10.0   * 204* 140*   < > 221*   < > 225*    < > = values in this interval not displayed.     No lab results found.    Invalid input(s): \"TROP\", \"TROPONINIES\"      "

## 2025-02-21 NOTE — PROGRESS NOTES
Care Management Follow Up    Length of Stay (days): 44    Expected Discharge Date: 02/22/2025     Concerns to be Addressed: other (see comments) (elevated risk)     Patient plan of care discussed at interdisciplinary rounds: Yes    Anticipated Discharge Disposition: Skilled Nursing Facility     Anticipated Discharge Services: None  Anticipated Discharge DME: None    Patient/family educated on Medicare website which has current facility and service quality ratings: no  Education Provided on the Discharge Plan: No  Patient/Family in Agreement with the Plan: yes    Referrals Placed by CM/SW:    Private pay costs discussed: Not applicable    Discussed  Partnership in Safe Discharge Planning  document with patient/family: No     Handoff Completed: No, handoff not indicated or clinically appropriate    Additional Information:  SW updated at morning rounds, pt is medically ready and will need TCU placement. SW looked through current referrals and denials and noted the [facility] was still pending,   HIGINIO called Inn[facility] at [facility] [phone] requested sw resend referral.  SW resent referral via fax.     Addendum [phone]: HIGINIO sent referrals to [facility]'s noting pt no longer on NG tube now Gtube feeding.     Addendum [phone]:   HIGINIO received call from Silva Oro regarding the transportation and Dialysis schedule:     HIGINIO called pt daughter Caroline Gray [phone] Caroline Gray confirmed that pt will need medical transport as pt currently needs a lift -  Dialysis is on MWF at [facility].    Note: Caroline Gray concerned that the discharge plan moved so quickly and indicated that she is particular about the TCU she goes to as they had a very bad experience at the last once and also want to be sure that pt is close to her as she provides a large amount of support for pt.   Caroline Gray asked for the Medicare rating for TCU's again as Silva indicated [facility] may an option that is close to [facility].   HIGINIO will  print rating list and meet with daughter tomorrow or leave in pt room for her to review.   Next Steps: Secure TCU placement, PAS Needed, Transport      Rafia Ugalde, BSW

## 2025-02-21 NOTE — PLAN OF CARE
Problem: Enteral Nutrition  Goal: Feeding Tolerance  Outcome: Not Progressing  Intervention: Prevent and Manage Feeding Intolerance  Recent Flowsheet Documentation  Taken 2/20/2025 1600 by Neena Baer RN  Nutrition Support Management: weight trending reviewed  Taken 2/20/2025 1200 by Neena Baer RN  Nutrition Support Management: weight trending reviewed  Taken 2/20/2025 0800 by Neena Baer RN  Nutrition Support Management: weight trending reviewed     Problem: Dysrhythmia  Goal: Normalized Cardiac Rhythm  Outcome: Not Progressing  Intervention: Monitor and Manage Cardiac Rhythm Effect  Recent Flowsheet Documentation  Taken 2/20/2025 1600 by Neena Baer RN  VTE Prevention/Management: SCDs off (sequential compression devices)  Taken 2/20/2025 1200 by Neena Baer RN  VTE Prevention/Management: SCDs off (sequential compression devices)  Taken 2/20/2025 0800 by Neena Baer RN  VTE Prevention/Management: SCDs off (sequential compression devices)     Problem: Nausea and Vomiting  Goal: Nausea and Vomiting Relief  Outcome: Not Progressing  Intervention: Prevent and Manage Nausea and Vomiting  Recent Flowsheet Documentation  Taken 2/20/2025 1600 by Neena Baer RN  Oral Care: swabbed with sterile water  Environmental Support:   calm environment promoted   personal routine supported  Taken 2/20/2025 1200 by Neena Baer RN  Oral Care: swabbed with sterile water  Environmental Support:   calm environment promoted   personal routine supported  Taken 2/20/2025 0800 by Neena Baer RN  Oral Care: swabbed with sterile water  Environmental Support:   calm environment promoted   personal routine supported     Problem: Skin Injury Risk Increased  Goal: Skin Health and Integrity  Outcome: Progressing  Intervention: Plan: Nurse Driven Intervention: Positioning  Recent Flowsheet Documentation  Taken 2/20/2025 1600 by Neena Baer RN  Plan: Positioning Interventions:   REPOSITION Left/Right (No supine) q2h   Use wedge positioners    OFF-LOAD HEELS with pillows  Taken 2/20/2025 1200 by Neena Baer RN  Plan: Positioning Interventions:   REPOSITION Left/Right (No supine) q2h   Use wedge positioners   OFF-LOAD HEELS with pillows  Taken 2/20/2025 0800 by Neena Baer RN  Plan: Positioning Interventions:   REPOSITION Left/Right (No supine) q2h   Use wedge positioners   OFF-LOAD HEELS with pillows  Intervention: Plan: Nurse Driven Intervention: Moisture Management  Recent Flowsheet Documentation  Taken 2/20/2025 1600 by Neena Baer RN  Moisture Interventions:   No brief in bed   Incontinence pad   Perineal cleanser  Taken 2/20/2025 1200 by Neena Baer RN  Moisture Interventions:   No brief in bed   Incontinence pad   Perineal cleanser  Taken 2/20/2025 0800 by Neena Baer RN  Moisture Interventions:   No brief in bed   Incontinence pad   Perineal cleanser  Intervention: Plan: Nurse Driven Intervention: Friction and Shear  Recent Flowsheet Documentation  Taken 2/20/2025 1600 by Neena Bare RN  Friction/Shear Interventions:   HOB 30 degrees or less   Silicone foam sacral dressing   Pad bony prominence (elbow pads, heel pads, ear protectors)   Assistive lifting device (portable/ceiling lift, etc.)   Lateral transfer device (hovermat, etc.)   Repositioning device (TAP system, etc.)  Taken 2/20/2025 1200 by Neena Baer RN  Friction/Shear Interventions:   HOB 30 degrees or less   Silicone foam sacral dressing   Pad bony prominence (elbow pads, heel pads, ear protectors)   Assistive lifting device (portable/ceiling lift, etc.)   Lateral transfer device (hovermat, etc.)   Repositioning device (TAP system, etc.)  Taken 2/20/2025 0800 by Neena Baer RN  Friction/Shear Interventions:   HOB 30 degrees or less   Silicone foam sacral dressing   Pad bony prominence (elbow pads, heel pads, ear protectors)   Assistive lifting device (portable/ceiling lift, etc.)   Lateral transfer device (hovermat, etc.)   Repositioning device (TAP system, etc.)  Intervention: Optimize  Skin Protection  Recent Flowsheet Documentation  Taken 2/20/2025 1600 by Neena Baer RN  Pressure Reduction Techniques:   frequent weight shift encouraged   heels elevated off bed   positioned off wounds   pressure points protected   sit time limited to 2 hours   weight shift assistance provided  Pressure Reduction Devices:   foam padding utilized   heel offloading device utilized   positioning supports utilized  Skin Protection:   adhesive use limited   incontinence pads utilized   protective footwear used   pulse oximeter probe site changed   silicone foam dressing in place   skin to device areas padded   skin to skin areas padded   skin sealant/moisture barrier applied   transparent dressing maintained   tubing/devices free from skin contact  Activity Management: activity adjusted per tolerance  Head of Bed (HOB) Positioning: HOB at 30 degrees  Taken 2/20/2025 1200 by Neena Baer RN  Pressure Reduction Techniques:   frequent weight shift encouraged   heels elevated off bed   positioned off wounds   pressure points protected   sit time limited to 2 hours   weight shift assistance provided  Pressure Reduction Devices:   foam padding utilized   heel offloading device utilized   positioning supports utilized  Skin Protection:   adhesive use limited   incontinence pads utilized   protective footwear used   pulse oximeter probe site changed   silicone foam dressing in place   skin to device areas padded   skin to skin areas padded   skin sealant/moisture barrier applied   transparent dressing maintained   tubing/devices free from skin contact  Activity Management: activity adjusted per tolerance  Head of Bed (HOB) Positioning: HOB at 30 degrees  Taken 2/20/2025 0800 by Neena Baer RN  Pressure Reduction Techniques:   frequent weight shift encouraged   heels elevated off bed   positioned off wounds   pressure points protected   sit time limited to 2 hours   weight shift assistance provided  Pressure Reduction Devices:    "foam padding utilized   heel offloading device utilized   positioning supports utilized  Skin Protection:   adhesive use limited   incontinence pads utilized   protective footwear used   pulse oximeter probe site changed   silicone foam dressing in place   skin to device areas padded   skin to skin areas padded   skin sealant/moisture barrier applied   transparent dressing maintained   tubing/devices free from skin contact  Activity Management: activity adjusted per tolerance  Head of Bed (HOB) Positioning: HOB at 30 degrees   Goal Outcome Evaluation:    Orientations: Alert and oriented: time, place, person, and situation.  Tele:Normal Sinus and A fib with rates in 140s this afternoon - PRN IV lopressor given with good results.   Pain:reports constant abdominal discomfort/fullness. Had headache at end of shift and accepted tylenol PRN.   Reported nausea at end of shift - accepted Zofran IV PRN.   Vitals:BP (!) 143/80 (BP Location: Right arm, Cuff Size: Adult Regular)   Pulse 84   Temp 98.2  F (36.8  C) (Oral)   Resp 20   Ht 1.702 m (5' 7.01\")   Wt 93.8 kg (206 lb 12.7 oz)   LMP  (LMP Unknown)   SpO2 96%   BMI 32.38 kg/m    Respiratory: The patient demonstrates: SOB with activity. decreased breath sounds throughout. The patient is maintaining saturations >92% on  2 LPM  at rest. The patient desaturates to the mid 80 % with room air at rest..  Cardiac:No signs of bleeding. S1 S2. Edema: trace flanks, arms, hands, and legs. Radial Pulses 2+. Pedial Pulses 2+.  Neuros:without deficit, generalized weakness.   GI: Soft, round, tender  and Bowel sounds normoactive. BM this shift: Yes. PEG in LUQ.  Diet: Other - NPO with nocturnal tube feeds. - Patient is eating 0% of trays and did not work with SLP team today.  Blood glucose monitoring: Yes  : The patient demonstrates No urinating - dialysis treatment ongoing.   Skin: normal, warm, rash underneath breasts and in abdominal folds, slow skin turgor, scattered " bruising, peeling on heel, BKA on left side.  Lines/Drains: PIV in right hand.  Labs:none today     Electrolyte Replacement: none today  Ambulation/Assist: assist of 2 and lift  Activity: patient frequently refuses repositioning - otherwise turned Q2H in bed  Changes to Plan of Care: none today.   Today's Progress: Patient required Lopressor IV for a fib RVR (rates 140s). Patient needed PRN tylenol and zofran this shift.      Neena Baer RN on 2/20/2025 at 7:05 PM

## 2025-02-21 NOTE — PLAN OF CARE
"Patient Name: Geronimo  MRN: [id number]  Date of Admission: 1/8/2025  Reason for Admission: Influenza   [place] [facility] [place]itals:   BP Readings from Last 1 Encounters:   02/21/25 134/86     Pulse Readings from Last 1 Encounters:   02/21/25 64     Wt Readings from Last 1 Encounters:   02/20/25 93.8 kg (206 lb 12.7 oz)     Ht Readings from Last 1 Encounters:   02/04/25 1.702 m (5' 7.01\")     Estimated body mass index is 32.38 kg/m  as calculated from the following:    Height as of this encounter: 1.702 m (5' 7.01\").    Weight as of this encounter: 93.8 kg (206 lb 12.7 oz).  Temp Readings from Last 1 Encounters:   02/21/25 97.6  F (36.4  C) (Axillary)       Pain: denies pain   Resp: 2L NC, CPAP overnight as tolerated   Telemetry: SR  Neuro: AOX4  GI/: 1 BM on shift,  no urine output (HD), NPO, tube feeds nocturnal that were not started until 8pm, now stop tube feeds 4 hours late at 1000,   Skin/Wounds: wound to sacrum/coccyx   Lines/Drains: 1 PIV SL  Activity: AX2 turn and repo, patient was refusing some of the repo's   Sleep: slept between cares        Aggression Stop Light: Green          Patient Care Plan: Plan wean 02 as tolerated, dialysis MWF    "

## 2025-02-21 NOTE — PROGRESS NOTES
[id number]  Orientations: A&Ox3-4- int confusion and forgetful. Calm and cooperative ex frequent repositioning.   Vitals/Pain: VSS on 2 L NC. Reported moderate abdominal pain this AM, given PRN tylenol x1.  Tele: SR  Lines/Drains: R PIV, SL. L AV fistula WDL. G tube  Skin/Wounds: pale and reddened bottom. Scattered bruising. Existing L AKA. Peeling to RLE heel/foot.   GI/: no void- on HD. - BM, given scheduled bowel and meds and reports passing gas. Int tube feeds vias G tube, stopped at 10 am and restarted at 4 pm per order. Meds via G tube= NPO.   Labs: Abnormal/Trends, Electrolyte Replacement- n/a  Ambulation/Assist: x2 lift. Repositioning encouraged, but often refused. Patient educated on importance.   Plan: Dialysis today- 2 Kilo removed. Activity encouraged. Off IMC status today. Continue plan of care.      Daughter concerned about intermittent hand tremors during this stay. Also would like SLP and PT to reassess her.

## 2025-02-21 NOTE — PROGRESS NOTES
Assessment and Plan:     End-stage renal disease on dialysis: She is running today for ultrafiltration and clearance.  We are running 3 hours with a left arm fistula and 15-gauge needles.  400 blood flow rate.  3 L ultrafiltration as tolerated.  3K, 33 bicarb, 140 sodium.  Low-dose heparin, EPO on dialysis.    She gets midodrine before dialysis.            Interval History:   Hypertension: She is on amlodipine, Lasix, hydralazine.        Diabetes: On insulin.           Review of Systems:   She is much more brighter and responsive today.  She denies symptoms on dialysis.          Medications:     Current Facility-Administered Medications   Medication Dose Route Frequency Provider Last Rate Last Admin    - MEDICATION INSTRUCTIONS for Dialysis Patients -   Does not apply See Admin Instructions Akosua Garcia, [facility]        amiodarone (PACERONE) tablet 200 mg  200 mg Oral BID Carly Faye DO   200 mg at 02/21/25 0801    amLODIPine (NORVASC) tablet 10 mg  10 mg Oral BID Braden Oneill MD   10 mg at 02/20/25 1748    apixaban ANTICOAGULANT (ELIQUIS) tablet 5 mg  5 mg Oral or Feeding Tube BID Don Trammell MD   5 mg at 02/21/25 0800    atorvastatin (LIPITOR) tablet 20 mg  20 mg Oral or Feeding Tube QPM Denver Shipman MD   20 mg at 02/20/25 2125    B and C vitamin Complex with folic acid (NEPHRONEX) liquid 5 mL  5 mL Per Feeding Tube Daily Denver Shipman MD   5 mL at 02/21/25 0800    budesonide (PULMICORT) neb solution 1 mg  1 mg Nebulization BID Denver Shipman MD   1 mg at 02/21/25 0714    cetirizine (zyrTEC) tablet 5 mg  5 mg Oral or Feeding Tube At Bedtime Denver Shipman MD   5 mg at 02/20/25 2125    digoxin (LANOXIN) half-tab 62.5 mcg  62.5 mcg Oral Daily Kamryn Selby PA-C   62.5 mcg at 02/20/25 0901    furosemide (LASIX) tablet 80 mg  80 mg Oral or Feeding Tube Daily Enu-Vivian Samaniego MD   80 mg at 02/20/25 0854    hydrALAZINE (APRESOLINE) tablet 75 mg  75 mg  Oral Q8H [facility] Carly Faye DO   75 mg at 02/21/25 0551    insulin aspart (NovoLOG) injection (RAPID ACTING)  1-7 Units Subcutaneous Q4H Carly Faye DO   2 Units at 02/21/25 1002    insulin glargine (LANTUS PEN) injection 15 Units  15 Units Subcutaneous BID Vivian Russell MD   15 Units at 02/16/25 2148    ipratropium (ATROVENT) 0.02 % neb solution 0.5 mg  0.5 mg Nebulization 4x daily Johanne Crowell MD   0.5 mg at 02/21/25 0714    levalbuterol (XOPENEX) neb solution 1.25 mg  1.25 mg Nebulization 4x daily Johanne Crowell MD   1.25 mg at 02/21/25 0714    miconazole (MICATIN) 2 % powder   Topical BID Denver Shipman MD   Given at 02/20/25 2125    midodrine (PROAMATINE) tablet 5 mg  5 mg Oral Once per day on Monday Wednesday Friday Vivian Russell MD   5 mg at 02/21/25 0801    pantoprazole (PROTONIX) 2 mg/mL suspension 40 mg  40 mg Per Feeding Tube [facility] Denver Mays MD   40 mg at 02/18/25 0942    Or    pantoprazole (PROTONIX) IV push injection 40 mg  40 mg Intravenous QA Denver Myas MD   40 mg at 02/21/25 0600    polyethylene glycol (MIRALAX) Packet 17 g  17 g Oral or Feeding Tube Daily Liset Romero MD   17 g at 02/13/25 0840    Prosource TF20 ENfit Compatibl EN LIQD (PROSOURCE TF20) packet 60 mL  1 packet Per Feeding Tube Daily Truman Moreland MD   60 mL at 02/20/25 0953    QUEtiapine (SEROquel) tablet 50 mg  50 mg Oral or Feeding Tube At Bedtime Cheo Watt MD   50 mg at 02/20/25 2125    sennosides (SENOKOT) syrup 10 mL  10 mL Oral or Feeding Tube BID Liset Romero MD   10 mL at 02/19/25 2122    sertraline (ZOLOFT) tablet 75 mg  75 mg Oral or Feeding Tube QPM Denver Shipman MD   75 mg at 02/20/25 2125    sodium chloride (PF) 0.9% PF flush 3 mL  3 mL Intracatheter Q8H Celine Plata APRN CNP   3 mL at 02/21/25 0553    tacrolimus (PROTOPIC) 0.1 % ointment   Topical BID Denver Shipman MD   Given at 02/20/25 2125    vitamin D3  (CHOLECALCIFEROL) tablet 50 mcg  50 mcg Oral or Feeding Tube Daily Denver Shipman MD   50 mcg at 02/21/25 0800     Current Facility-Administered Medications   Medication Dose Route Frequency Provider Last Rate Last Admin    dextrose 10% infusion   Intravenous Continuous PRN Denver Shipman MD        heparin 10,000 units/10 mL infusion (DIALYSIS USE)  500 Units/hr Hemodialysis Machine Continuous Braden Oneill MD 0.5 mL/hr at 02/21/25 0956 500 Units/hr at 02/21/25 0956    No lozenges or gum should be given while patient on BIPAP/AVAPS/AVAPS AE   Does not apply Continuous PRN Denver Shipman MD        Patient may continue current oral medications   Does not apply Continuous PRN Denver Shipman MD        sodium chloride 0.9 % infusion   Intravenous Continuous Denver Shipman MD 10 mL/hr at 02/05/25 1831 10 mL/hr at 02/05/25 1831    Stop Heparin 60 minutes before end of treatment   Does not apply Continuous PRN Braden Oneill MD         Current active medications and PTA medications reviewed, see medication list for details.            Physical Exam:   Vitals were reviewed  Patient Vitals for the past 24 hrs:   BP Temp Temp src Pulse Resp SpO2   02/21/25 0945 (!) 138/39 -- -- 73 -- --   02/21/25 0930 135/44 -- -- 73 -- --   02/21/25 0915 136/45 -- -- 89 -- --   02/21/25 0900 (!) 149/46 -- -- 88 -- --   02/21/25 0837 (!) 147/49 -- -- 77 -- --   02/21/25 0830 (!) 144/61 98.4  F (36.9  C) Oral 77 -- 95 %   02/21/25 0800 138/58 -- -- 78 20 95 %   02/21/25 0759 -- 98.1  F (36.7  C) Oral -- -- --   02/21/25 0722 -- -- -- 74 -- 99 %   02/21/25 0714 -- -- -- -- -- 96 %   02/21/25 0600 138/47 -- -- 72 -- 95 %   02/21/25 0551 134/86 -- -- -- -- --   02/21/25 0400 109/40 -- -- 71 -- 99 %   02/21/25 0200 105/40 -- -- 66 -- 99 %   02/21/25 0000 116/48 97.6  F (36.4  C) Axillary 64 20 99 %   02/20/25 2200 (!) 126/39 -- -- 70 -- 99 %   02/20/25 2125 (!) 131/39 -- -- -- -- --   02/20/25 2100 -- 97.9  F  (36.6  C) Oral -- 16 --   25 139/64 -- -- 76 -- 98 %   25 1918 -- -- -- -- -- 96 %   25 1800 (!) 143/80 -- -- 84 20 96 %   25 1700 (!) 140/45 -- -- 101 -- 97 %   25 1615 -- -- -- (!) 146 -- --   25 1613 -- 98.2  F (36.8  C) Oral -- -- --   25 1600 108/54 -- -- (!) 131 20 98 %   25 1559 -- 98.3  F (36.8  C) Oral -- -- --   25 1531 -- -- -- 85 -- 98 %   25 1400 (!) 147/49 -- -- 84 18 94 %   25 1327 -- -- -- 84 -- (!) 91 %   25 1326 -- -- -- -- -- (!) 84 %   25 1200 (!) 156/50 -- -- 80 20 98 %   25 1138 -- 98.6  F (37  C) Oral -- -- --       Temp:  [97.6  F (36.4  C)-98.6  F (37  C)] 98.4  F (36.9  C)  Pulse:  [] 73  Resp:  [16-20] 20  BP: (105-156)/(39-86) 138/39  SpO2:  [84 %-99 %] 95 %    Temperatures:  Current - Temp: 98.4  F (36.9  C); Max - Temp  Av.2  F (36.8  C)  Min: 97.6  F (36.4  C)  Max: 98.6  F (37  C)  Respiration range: Resp  Av.1  Min: 16  Max: 20  Pulse range: Pulse  Av.8  Min: 64  Max: 146  Blood pressure range: Systolic (24hrs), Av , Min:105 , Max:156   ; Diastolic (24hrs), Av, Min:39, Max:86    Pulse oximetry range: SpO2  Av.1 %  Min: 84 %  Max: 99 %    I/O last 3 completed shifts:  In: 1126 [I.V.:6; NG/GT:210]  Out: -       Intake/Output Summary (Last 24 hours) at 2025 1001  Last data filed at 2025 0800  Gross per 24 hour   Intake 216 ml   Output --   Net 216 ml       Alert and responsive  Left arm fistula with needles in place  Lungs show clear breath sounds  Cardiac exam shows regular rhythm normal S1-S2 no murmur  Lower extremities show no edema on the right, AKA on the left.       Wt Readings from Last 4 Encounters:   25 93.8 kg (206 lb 12.7 oz)   24 101.1 kg (222 lb 14.2 oz)   24 101.1 kg (222 lb 14.2 oz)   24 101.1 kg (222 lb 14.2 oz)          Data:          Lab Results   Component Value Date     2025     2025      02/18/2025     04/17/2021     04/09/2021     11/18/2020    Lab Results   Component Value Date    CHLORIDE 97 02/21/2025    CHLORIDE 93 02/19/2025    CHLORIDE 96 02/18/2025    CHLORIDE 106 11/24/2021    CHLORIDE 109 11/23/2021    CHLORIDE 110 11/22/2021    CHLORIDE 105 04/17/2021    CHLORIDE 101 04/09/2021    CHLORIDE 106 11/18/2020    Lab Results   Component Value Date    BUN 66.4 02/21/2025    BUN 77.8 02/19/2025    BUN 48.9 02/18/2025    BUN 37 11/24/2021    BUN 69 11/23/2021    BUN 64 11/22/2021    BUN 68 04/17/2021    BUN 53 04/09/2021    BUN 44 11/18/2020      Lab Results   Component Value Date    POTASSIUM 4.1 02/21/2025    POTASSIUM 3.9 02/19/2025    POTASSIUM 3.6 02/18/2025    POTASSIUM 4.7 02/02/2022    POTASSIUM 3.8 11/24/2021    POTASSIUM 6.6 11/23/2021    POTASSIUM 4.2 04/17/2021    POTASSIUM 3.8 04/16/2021    POTASSIUM 3.9 04/09/2021    Lab Results   Component Value Date    CO2 28 02/21/2025    CO2 28 02/19/2025    CO2 29 02/18/2025    CO2 31 11/24/2021    CO2 23 11/23/2021    CO2 24 11/22/2021    CO2 23 04/17/2021    CO2 22 04/09/2021    CO2 29 11/18/2020    Lab Results   Component Value Date    CR 4.02 02/21/2025    CR 4.36 02/19/2025    CR 3.14 02/18/2025    CR 5.98 04/17/2021    CR 4.35 04/16/2021    CR 5.28 04/09/2021        Recent Labs   Lab Test 02/21/25  0530 02/19/25  0536 02/18/25  0447   WBC 6.6 5.9 4.2   HGB 7.4* 7.6* 7.6*   HCT 23.8* 24.2* 24.5*   MCV 99 97 98    228 222     Recent Labs   Lab Test 02/05/25 2004 02/02/25  0542 01/26/25  1135   AST 17 12 21   ALT 24 22 61*   ALKPHOS 75 55 49   BILITOTAL 0.3 0.2 0.2       Recent Labs   Lab Test 02/17/25  0544 02/16/25  0529 02/15/25  0842   MAG 2.2 2.1 2.2     Recent Labs   Lab Test 02/17/25  0544 02/12/25  0523 02/11/25  0507   PHOS 3.4 4.8* 3.6     Recent Labs   Lab Test 02/21/25  0530 02/19/25  0536 02/18/25  0447   JODY 10.3 10.2 10.0       Lab Results   Component Value Date    JODY 10.3 02/21/2025     Lab  Results   Component Value Date    WBC 6.6 02/21/2025    HGB 7.4 (L) 02/21/2025    HCT 23.8 (L) 02/21/2025    MCV 99 02/21/2025     02/21/2025     Lab Results   Component Value Date     02/21/2025    POTASSIUM 4.1 02/21/2025    CHLORIDE 97 (L) 02/21/2025    CO2 28 02/21/2025     (H) 02/21/2025     Lab Results   Component Value Date    BUN 66.4 (H) 02/21/2025    CR 4.02 (H) 02/21/2025     Lab Results   Component Value Date    MAG 2.2 02/17/2025     Lab Results   Component Value Date    PHOS 3.4 02/17/2025       Creatinine   Date Value Ref Range Status   02/21/2025 4.02 (H) 0.51 - 0.95 mg/dL Final   02/19/2025 4.36 (H) 0.51 - 0.95 mg/dL Final   02/18/2025 3.14 (H) 0.51 - 0.95 mg/dL Final   02/17/2025 4.70 (H) 0.51 - 0.95 mg/dL Final   02/16/2025 3.89 (H) 0.51 - 0.95 mg/dL Final   02/15/2025 2.92 (H) 0.51 - 0.95 mg/dL Final   04/17/2021 5.98 (H) 0.52 - 1.04 mg/dL Final   04/16/2021 4.35 (H) 0.52 - 1.04 mg/dL Final   04/09/2021 5.28 (H) 0.52 - 1.04 mg/dL Final   11/18/2020 4.23 (H) 0.52 - 1.04 mg/dL Final   11/16/2020 5.08 (H) 0.52 - 1.04 mg/dL Final   09/25/2020 6.87 (H) 0.52 - 1.04 mg/dL Final       Attestation:  I have reviewed today's vital signs, notes, medications, labs and imaging.  Seen on dialysis.     Braden Oneill MD

## 2025-02-21 NOTE — PROGRESS NOTES
Potassium   Date Value Ref Range Status   02/21/2025 4.1 3.4 - 5.3 mmol/L Final   02/02/2022 4.7 3.4 - 5.3 mmol/L Final   04/17/2021 4.2 3.4 - 5.3 mmol/L Final     Hemoglobin   Date Value Ref Range Status   02/21/2025 7.4 (L) 11.7 - 15.7 g/dL Final   04/16/2021 10.9 (L) 11.7 - 15.7 g/dL Final       DIALYSIS PROCEDURE NOTE  Hepatitis status of previous patient on machine log was checked and verified ok to use with this patients hepatitis status.  Patient dialyzed for 3 hrs. on a K4 bath with a net fluid removal of  2L.  A BFR of 450 ml/min was obtained via a left arm AVfistula using 15 gauge needles.      The treatment plan was discussed with Dr. Oneill during the treatment.    Total heparin received during the treatment: 1200 units.   Needle cannulation sites held x 5 min.     Meds  given: Epo   Complications: None      Person educated: pt. Knowledge base substantial. Barriers to learning: none. Educated on procedure via verbal mode. Patient verbalized understanding.   ICEBOAT? Timeout performed pre-treatment  I: Patient was identified using 2 identifiers  C:  Consent Signed Yes  E: Equipment preventative maintenance is current and dialysis delivery system OK to use  B: Hepatitis B Surface Antigen: Negative; Draw Date: 2/6/2025      Hepatitis B Surface Antibody: Susceptible; Draw Date: 1/9/2025  O: Dialysis orders present and complete prior to treatment  A: Vascular access verified and assessed prior to treatment  T: Treatment was performed at a clinically appropriate time  ?: Patient was allowed to ask questions and address concerns prior to treatment  See Adult Hemodialysis flowsheet in [employer] for further details and post assessment.  Machine water alarm in place and functioning. Transducer pods intact and checked every 15min.   Pt assisted with repositioning throughout dialysis treatment.  Pt returned via bed.  Chlorine/Chloramine water system checked every 4 hours.  Outpatient Dialysis at [facility].      Post treatment  report given to Gracie ESCUDERO RN regarding 2 L of fluid removed.    Yen Nesbitt Dialysis RN

## 2025-02-21 NOTE — PROVIDER NOTIFICATION
MD Notification    Notified Person: MD    Notified Person Name: ANGEL    Notification Date/Time:2000 2/20/2025    Notification Interaction: vocera     Purpose of Notification: respiratory requested an order for cpap as the patient uses it at home     Orders Received: cpap ordered     Comments:

## 2025-02-22 LAB
ANION GAP SERPL CALCULATED.3IONS-SCNC: 11 MMOL/L (ref 7–15)
BASE EXCESS BLDV CALC-SCNC: 6.7 MMOL/L (ref -3–3)
BUN SERPL-MCNC: 43.6 MG/DL (ref 8–23)
CALCIUM SERPL-MCNC: 9.6 MG/DL (ref 8.8–10.4)
CHLORIDE SERPL-SCNC: 97 MMOL/L (ref 98–107)
CREAT SERPL-MCNC: 2.83 MG/DL (ref 0.51–0.95)
EGFRCR SERPLBLD CKD-EPI 2021: 17 ML/MIN/1.73M2
ERYTHROCYTE [DISTWIDTH] IN BLOOD BY AUTOMATED COUNT: 16.8 % (ref 10–15)
GLUCOSE BLDC GLUCOMTR-MCNC: 107 MG/DL (ref 70–99)
GLUCOSE BLDC GLUCOMTR-MCNC: 125 MG/DL (ref 70–99)
GLUCOSE BLDC GLUCOMTR-MCNC: 160 MG/DL (ref 70–99)
GLUCOSE BLDC GLUCOMTR-MCNC: 163 MG/DL (ref 70–99)
GLUCOSE BLDC GLUCOMTR-MCNC: 166 MG/DL (ref 70–99)
GLUCOSE BLDC GLUCOMTR-MCNC: 173 MG/DL (ref 70–99)
GLUCOSE BLDC GLUCOMTR-MCNC: 178 MG/DL (ref 70–99)
GLUCOSE SERPL-MCNC: 192 MG/DL (ref 70–99)
HCO3 BLDV-SCNC: 34 MMOL/L (ref 21–28)
HCO3 SERPL-SCNC: 30 MMOL/L (ref 22–29)
HCT VFR BLD AUTO: 24.7 % (ref 35–47)
HGB BLD-MCNC: 7.6 G/DL (ref 11.7–15.7)
MCH RBC QN AUTO: 30.9 PG (ref 26.5–33)
MCHC RBC AUTO-ENTMCNC: 30.8 G/DL (ref 31.5–36.5)
MCV RBC AUTO: 100 FL (ref 78–100)
O2/TOTAL GAS SETTING VFR VENT: 3 %
OXYHGB MFR BLDV: 84 % (ref 70–75)
PCO2 BLDV: 59 MM HG (ref 40–50)
PH BLDV: 7.37 [PH] (ref 7.32–7.43)
PLATELET # BLD AUTO: 245 10E3/UL (ref 150–450)
PO2 BLDV: 53 MM HG (ref 25–47)
POTASSIUM SERPL-SCNC: 3.7 MMOL/L (ref 3.4–5.3)
RBC # BLD AUTO: 2.46 10E6/UL (ref 3.8–5.2)
SAO2 % BLDV: 85.5 % (ref 70–75)
SODIUM SERPL-SCNC: 138 MMOL/L (ref 135–145)
WBC # BLD AUTO: 6.7 10E3/UL (ref 4–11)

## 2025-02-22 PROCEDURE — 85027 COMPLETE CBC AUTOMATED: CPT | Performed by: INTERNAL MEDICINE

## 2025-02-22 PROCEDURE — 999N000157 HC STATISTIC RCP TIME EA 10 MIN

## 2025-02-22 PROCEDURE — 250N000013 HC RX MED GY IP 250 OP 250 PS 637: Performed by: HOSPITALIST

## 2025-02-22 PROCEDURE — 99232 SBSQ HOSP IP/OBS MODERATE 35: CPT | Performed by: HOSPITALIST

## 2025-02-22 PROCEDURE — 94640 AIRWAY INHALATION TREATMENT: CPT

## 2025-02-22 PROCEDURE — 82310 ASSAY OF CALCIUM: CPT | Performed by: HOSPITALIST

## 2025-02-22 PROCEDURE — 250N000009 HC RX 250: Performed by: HOSPITALIST

## 2025-02-22 PROCEDURE — 250N000013 HC RX MED GY IP 250 OP 250 PS 637: Performed by: INTERNAL MEDICINE

## 2025-02-22 PROCEDURE — 94640 AIRWAY INHALATION TREATMENT: CPT | Mod: 76

## 2025-02-22 PROCEDURE — 120N000001 HC R&B MED SURG/OB

## 2025-02-22 PROCEDURE — 36415 COLL VENOUS BLD VENIPUNCTURE: CPT | Performed by: HOSPITALIST

## 2025-02-22 PROCEDURE — 250N000013 HC RX MED GY IP 250 OP 250 PS 637: Performed by: PHYSICIAN ASSISTANT

## 2025-02-22 PROCEDURE — 82805 BLOOD GASES W/O2 SATURATION: CPT | Performed by: INTERNAL MEDICINE

## 2025-02-22 PROCEDURE — 80048 BASIC METABOLIC PNL TOTAL CA: CPT | Performed by: HOSPITALIST

## 2025-02-22 RX ADMIN — HYDRALAZINE HYDROCHLORIDE 75 MG: 50 TABLET ORAL at 06:02

## 2025-02-22 RX ADMIN — IPRATROPIUM BROMIDE 0.5 MG: 0.5 SOLUTION RESPIRATORY (INHALATION) at 07:16

## 2025-02-22 RX ADMIN — HYDRALAZINE HYDROCHLORIDE 75 MG: 50 TABLET ORAL at 14:04

## 2025-02-22 RX ADMIN — LEVALBUTEROL HYDROCHLORIDE 1.25 MG: 1.25 SOLUTION RESPIRATORY (INHALATION) at 07:16

## 2025-02-22 RX ADMIN — PANTOPRAZOLE SODIUM 40 MG: 40 INJECTION, POWDER, FOR SOLUTION INTRAVENOUS at 06:02

## 2025-02-22 RX ADMIN — IPRATROPIUM BROMIDE 0.5 MG: 0.5 SOLUTION RESPIRATORY (INHALATION) at 20:30

## 2025-02-22 RX ADMIN — AMLODIPINE BESYLATE 10 MG: 10 TABLET ORAL at 17:18

## 2025-02-22 RX ADMIN — APIXABAN 5 MG: 5 TABLET, FILM COATED ORAL at 20:35

## 2025-02-22 RX ADMIN — ATORVASTATIN CALCIUM 20 MG: 20 TABLET, FILM COATED ORAL at 20:35

## 2025-02-22 RX ADMIN — AMIODARONE HYDROCHLORIDE 200 MG: 200 TABLET ORAL at 20:35

## 2025-02-22 RX ADMIN — TACROLIMUS: 1 OINTMENT TOPICAL at 20:36

## 2025-02-22 RX ADMIN — APIXABAN 5 MG: 5 TABLET, FILM COATED ORAL at 09:31

## 2025-02-22 RX ADMIN — LEVALBUTEROL HYDROCHLORIDE 1.25 MG: 1.25 SOLUTION RESPIRATORY (INHALATION) at 20:31

## 2025-02-22 RX ADMIN — AMLODIPINE BESYLATE 10 MG: 10 TABLET ORAL at 09:31

## 2025-02-22 RX ADMIN — BUDESONIDE 1 MG: 1 INHALANT ORAL at 07:16

## 2025-02-22 RX ADMIN — Medication 60 ML: at 10:56

## 2025-02-22 RX ADMIN — BUDESONIDE 1 MG: 1 INHALANT ORAL at 20:30

## 2025-02-22 RX ADMIN — DIGOXIN 62.5 MCG: 125 TABLET ORAL at 09:31

## 2025-02-22 RX ADMIN — SERTRALINE HYDROCHLORIDE 75 MG: 50 TABLET ORAL at 20:35

## 2025-02-22 RX ADMIN — IPRATROPIUM BROMIDE 0.5 MG: 0.5 SOLUTION RESPIRATORY (INHALATION) at 14:56

## 2025-02-22 RX ADMIN — QUETIAPINE FUMARATE 50 MG: 50 TABLET ORAL at 22:45

## 2025-02-22 RX ADMIN — LEVALBUTEROL HYDROCHLORIDE 1.25 MG: 1.25 SOLUTION RESPIRATORY (INHALATION) at 14:56

## 2025-02-22 RX ADMIN — AMIODARONE HYDROCHLORIDE 200 MG: 200 TABLET ORAL at 09:31

## 2025-02-22 RX ADMIN — IPRATROPIUM BROMIDE 0.5 MG: 0.5 SOLUTION RESPIRATORY (INHALATION) at 11:15

## 2025-02-22 RX ADMIN — Medication 5 ML: at 09:35

## 2025-02-22 RX ADMIN — ACETAMINOPHEN 650 MG: 325 TABLET, FILM COATED ORAL at 17:47

## 2025-02-22 RX ADMIN — TACROLIMUS: 1 OINTMENT TOPICAL at 09:36

## 2025-02-22 RX ADMIN — MICONAZOLE NITRATE: 2 POWDER TOPICAL at 09:36

## 2025-02-22 RX ADMIN — CETIRIZINE HYDROCHLORIDE 5 MG: 5 TABLET ORAL at 22:46

## 2025-02-22 RX ADMIN — HYDRALAZINE HYDROCHLORIDE 75 MG: 50 TABLET ORAL at 22:45

## 2025-02-22 RX ADMIN — INSULIN GLARGINE 15 UNITS: 100 INJECTION, SOLUTION SUBCUTANEOUS at 20:36

## 2025-02-22 RX ADMIN — INSULIN GLARGINE 15 UNITS: 100 INJECTION, SOLUTION SUBCUTANEOUS at 09:36

## 2025-02-22 RX ADMIN — MICONAZOLE NITRATE: 2 POWDER TOPICAL at 20:36

## 2025-02-22 RX ADMIN — SENNOSIDES 10 ML: 8.8 LIQUID ORAL at 09:31

## 2025-02-22 RX ADMIN — POLYETHYLENE GLYCOL 3350 17 G: 17 POWDER, FOR SOLUTION ORAL at 09:31

## 2025-02-22 RX ADMIN — LEVALBUTEROL HYDROCHLORIDE 1.25 MG: 1.25 SOLUTION RESPIRATORY (INHALATION) at 11:15

## 2025-02-22 RX ADMIN — FUROSEMIDE 80 MG: 40 TABLET ORAL at 09:31

## 2025-02-22 RX ADMIN — Medication 50 MCG: at 09:31

## 2025-02-22 ASSESSMENT — ACTIVITIES OF DAILY LIVING (ADL)
ADLS_ACUITY_SCORE: 84
ADLS_ACUITY_SCORE: 83
ADLS_ACUITY_SCORE: 83
ADLS_ACUITY_SCORE: 84
ADLS_ACUITY_SCORE: 83
ADLS_ACUITY_SCORE: 83
ADLS_ACUITY_SCORE: 84
ADLS_ACUITY_SCORE: 85
ADLS_ACUITY_SCORE: 81
ADLS_ACUITY_SCORE: 83
ADLS_ACUITY_SCORE: 81
ADLS_ACUITY_SCORE: 83
ADLS_ACUITY_SCORE: 85
ADLS_ACUITY_SCORE: 83
ADLS_ACUITY_SCORE: 84
ADLS_ACUITY_SCORE: 83
ADLS_ACUITY_SCORE: 83
ADLS_ACUITY_SCORE: 84
ADLS_ACUITY_SCORE: 85
ADLS_ACUITY_SCORE: 84

## 2025-02-22 NOTE — PROGRESS NOTES
0700-1930  Orientations: A&Ox3-4- int confusion and forgetful.  Vitals/Pain: VSS on 2 L NC. Denies pain today.  Tele: SR  Lines/Drains: R PIV, SL. L AV fistula WDL. G tube  Skin/Wounds: pale and reddened bottom. Scattered bruising. Existing L AKA. Peeling to RLE heel/foot.   GI/: no void- on HD. + BM. Int tube feeds vias G tube, restarted at 5 PM. Meds via G tube= NPO.   Labs: Abnormal/Trends, Electrolyte Replacement- n/a  Ambulation/Assist: x2 lift. Repositioning encouraged, but often refused. Patient educated on importance. Patient up in chair x1 with much encouragement.   Plan: Activity encouraged. Off IMC status today. Pending TCU placement. Continue plan of care.

## 2025-02-22 NOTE — PROGRESS NOTES
"[facility]  Hospitalist Progress Note        Chandu Rodriguez MD   02/22/2025        Interval History:        Patient seen and examined with her daughter present in room    - No acute issues overnight but daughter concerned about intermittent twitches and essential tremor; upon chart review I see that this has been noted since earlier in the hospital stay  - an EEG on 2/10 was noted abnormal due to the presence of moderate generalized slowing of the background indicative of diffuse cerebral dysfunction (encephalopathy) seen in toxic, metabolic conditions, diffuse structural cerebral abnormalities; noted \" this abnormal routine EEG neither refutes nor confirms diagnosis of seizures or epilepsy\"  - no clinical concern for generalized seizure ; will monitor clinically for now and if worsening can consider neurology reevaluation with video EEG monitoring  -  following for disposition to TCU         Assessment and Plan:        Supriya Herr is a 74 year old female with PMHx significant for ESRD on HD, CHF, COPD, poor mobility (Lt AKA, Obesity, WC bound), DM type II, hypertension.  She was brought to the ER by EMS for evaluation of shortness of breath, diagnosed with influenza A and admitted on 1/8/25       Hospital course summary  She was intubated for worsening respiratory failure and remained on mechanical ventilator 1/9 - 1/18; reintubated (1/20 - 1/25).  Was treated with Tamiflu, IV antibiotic, steroid, has completed courses.  Hospital course complicated by A-fib RVR.  Was on amiodarone drip with eventual transition to p.o. Also required diltiazem  for rate control.  Required BiPAP--HFNC--intermittent BiPAP use.  Ongoing tube feeding and modified diet per SLP.  Delirium managed with Seroquel.  Concern for recurrent pneumonia, hospital-acquired on 2/1, restarted on antibiotics.  ID consulted, recommended discontinuing antibiotics, respiratory failure likely related to fluid " overload.     Patient developed symptomatic bradycardia on 2/5/25 triggering a RRT. She was placed on dopamine drip and transferred back to the ICU. Bradycardia was likely due to rate controlling medications. Cardiology consulted with adjustment of medications. Patient has remained in NSR without recurrence of bradycardia on amio drip. While in the ICU, patient required dialysis on pressors, now transitioned to midodrine . Hypoxic and hypercarbic resp failure is much improved with dialysis.      Patient transferred back to hospitalist service on 2/8/25. Recurrent issues with atrial fib with RVR. Disposition now pending TCU placement.     Acute hypoxic respiratory failure requiring mechanical ventilation  Influenza A pneumonia  Acute COPD exacerbation  Hospital-acquired pneumonia  Pharyngeal edema  - Initial presentation with shortness of breath, diagnosed influenza A; prolonged ICU course requiring mechanical ventilation as noted above.  *1/20 bronchoscopy and BAL: culture grew actinomyces  *CT chest showed RML infiltrate versus atelectasis but no sign of actinomycosis, intensivist felt this was likely colonization.    *Pneumonia treated with cefepime and vancomycin, then Levaquin. Completed course 1/26.  *Flu and COPD exacerbation was treated with IV steroids and tamiflu.  *2/1 had increased O2 needs, concern for new infection. repeat blood cx --negative. CT chest showed possible right lower lobe pneumonia and ID was consulted and patient was again started on vancomycin and Levaquin, then subsequently discontinued as respiratory failure was determined to be due to fluid overload as she improved with dialysis.  - Continue budesonide 1 Mg twice daily, ipratropium/xopenex 4 times daily  - weaning off O2, respiratory status stable on 2 L oxygen     Recurrent atrial fibrillation with RVR  Acute on chronic CHFpEF  Hypertension  Shock, due to sedation-resolved  Symptomatic bradycardia-Resolved  - PTA amlodipine 5 Mg BID,  Coreg 25 Mg BID and Lasix.  *1/21/25 TTE: LVEF 60%, no pericardial effusion, no significant valvular disease, diastolic function indeterminate.  No WMA.  *2/2/25 had symptomatic bradycardia due to rate controlling medications requiring RRT. Placed on dopamine gtt, coreg and diltiazem were held. Her heart went back into normal sinus and only amiodarone 200mg daily was continued at that point.  *2/10/25: developed atrial fib with RVR, amiodarone gtt restarted and PO dose increased from daily to BID.  *2/12/25: restarted amlodipine and oral hydralazine for uptrending BPs  *2/15/25: recurrent atrial fib with RVR, RRT called, amiodarone gtt restarted.  - Cardiology has followed on/off due to recurrent atrial fib and bradycardia.   - Intermittent needs for amiodarone gtt, currently on amiodarone oral 200mg BID.  - initial plan for PPM on 2/18; now discontinued per EP (signed off 2/19). No indication for PPM at this time.   - A-fib to be managed with digoxin-- switched to three times a week (2/22) per pharmacy recs  - Continue with atorvastatin 20 mg daily and Lasix 80 mg daily (hold on dialysis days)  - continue Eliquis 5mg BID  - continue amlodipine 5mg BID, hydralazine 75mg q8h, amiodarone 200 mg BID  -gets midodrine prior to dialysis     Encephalopathy/delirium likely infectious and metabolic-improved  Intermittent twitchings  Anxiety  *1/20/25 CT head: negative for acute intracranial process; Suspected some baseline cognitive impairment.    *2/10/25: CT head: no acute intracranial process.   *2/10/25 EEG: showed moderate generalized slowing and triphasic waves which per neurology commonly seen in chronic kidney disease.   - Of note has had on and off jerking movements/twitchings during her hospital stay  - Neurology evaluated (2/11) and recommended IV thiamine supplements X 5 days  - stable since 2/11/25: AOx2-3, fluctuating mental status  - no clinical concern for generalized seizure ; will monitor clinically for now  and if worsening can consider neurology reevaluation with video EEG monitoring  - Continue Seroquel 50mg qhs  - Continue PTA Zoloft and gabapentin.  - PT, OT recommend TCU     Diabetes mellitus, type 2  HbA1c 6.5% on 1/17/25. PTA Lantus 18u qd and NovoLog 5u TIDWM  - Lantus 15 units BID resumed on 2/21. Iniitally held for procedure   -medium resistance sliding scale insulin q4h  -Hypoglycemia protocol     ESRD on HD , wasTue-Thu-Sat, now MWF  Anemia of chronic disease  Hyperkalemia-resolved  -Nephrology following, currently MWF dialysis  -Hemoglobin 7-8, at baseline.  -Gets EPO with hemodialysis.  -Continue vitamin D3.    -Nephrology resumed Lasix on nondialysis days on 2/3.    -continue midodrine daily MWF with dialysis  -Lokelma discontinued on 2/10     Constipation  -Noted on CT 2/2  -Continue the bowel regimen      Dysphagia  Suspect due to recurrent intubation. Did well with video swallow study on 1/31/25, diet advanced.  But again was made n.p.o. on 2/11  *2/16 ENT evaluated with flexible scope: mild edema of arytenoids. No candida infection of pharynx of larynx. No findings to explain dysphagia  *2/17 G tube placed with IR  - nutrition following for tube feeds      Likely cerumen impaction- resolved  2/11 reported ear pressure and some ringing  - improved with debrox drops added 2/12, finished course 2/16. Ears clear when evaluated by ENT on 2/16      DVT Prophylaxis: on Eliquis    Code Status: Full Code       Diet:   Adult Formula Drip Feeding: [employer] Renal Support; Gastrostomy; Goal Rate: 65; mL/hr; From: 4:00 PM; To: 6:00 AM; run x14 hours nightly for total volume 910 mL (orders updated 2/18 to reflect new enteral access)      Disposition:   Medically Ready for Discharge: Ready Now pending TCU placement;  following for disposition    Care plan discussed with patient and nursing    Discussed treatment care plan extensively with her daughter present in room and answered several  "questions regarding her mentation/delirium, tube feeding, twitchings; daughter also concerned about her disposition and wants to assure she goes to TCU with a good rating    Total time spent today 52 minutes       Clinically Significant Risk Factors          # Hypochloremia: Lowest Cl = 97 mmol/L in last 2 days, will monitor as appropriate      # Hypoalbuminemia: Lowest albumin = 2.8 g/dL at 1/13/2025  5:14 AM, will monitor as appropriate       # Hypertension: Noted on problem list    # Chronic heart failure with preserved ejection fraction: heart failure noted on problem list and last echo with EF >50%      # Acute Hypercapnic Respiratory Failure: based on venous blood gas results.  Continue supplemental oxygen and ventilatory support as indicated.          # DMII: A1C = 6.5 % (Ref range: <5.7 %) within past 6 months   # Obesity: Estimated body mass index is 32.62 kg/m  as calculated from the following:    Height as of this encounter: 1.702 m (5' 7.01\").    Weight as of this encounter: 94.5 kg (208 lb 5.4 oz).        # Financial/Environmental Concerns: none                            Physical Exam:      Blood pressure (!) 167/45, pulse 73, temperature 97.8  F (36.6  C), temperature source Oral, resp. rate 18, height 1.702 m (5' 7.01\"), weight 94.5 kg (208 lb 5.4 oz), SpO2 99%, not currently breastfeeding.  Vitals:    02/18/25 1607 02/20/25 0425 02/22/25 0544   Weight: 95.8 kg (211 lb 3.2 oz) 93.8 kg (206 lb 12.7 oz) 94.5 kg (208 lb 5.4 oz)     Vital Signs with Ranges  Temp:  [97.8  F (36.6  C)-99.5  F (37.5  C)] 97.8  F (36.6  C)  Pulse:  [69-89] 73  Resp:  [18-20] 18  BP: ()/() 167/45  SpO2:  [91 %-99 %] 99 %  I/O's Last 24 hours  I/O last 3 completed shifts:  In: 1286 [NG/GT:450]  Out: 2000 [Other:2000]    Constitutional: Alert, awake and oriented; resting comfortably in no apparent distress       Oral cavity: Moist mucosa   Cardiovascular: Normal s1 s2, regular rate and rhythm, no murmur   Lungs: B/l " clear to auscultation, no wheezes or crepitations   Abdomen: Soft, nt, nd, no guarding, rigidity or rebound; BS +; G tube in place   LE : No edema on right; left AKA status   Musculoskeletal/Neuro Power 5/5 in all extremities; No focal neurological deficits noted; occasional twitchings noted   Psychiatry: normal mood and affect                Medications:        Current Facility-Administered Medications   Medication Dose Route Frequency Provider Last Rate Last Admin    - MEDICATION INSTRUCTIONS for Dialysis Patients -   Does not apply See Admin Instructions Akosua Garcia, [facility]        amiodarone (PACERONE) tablet 200 mg  200 mg Oral BID Carly Faye DO   200 mg at 02/21/25 2108    amLODIPine (NORVASC) tablet 10 mg  10 mg Oral BID Braden Oneill MD   10 mg at 02/21/25 1840    apixaban ANTICOAGULANT (ELIQUIS) tablet 5 mg  5 mg Oral or Feeding Tube BID Don Trammell MD   5 mg at 02/21/25 2108    atorvastatin (LIPITOR) tablet 20 mg  20 mg Oral or Feeding Tube QPM Denver Shipman MD   20 mg at 02/21/25 2108    B and C vitamin Complex with folic acid (NEPHRONEX) liquid 5 mL  5 mL Per Feeding Tube Daily Denver Shipman MD   5 mL at 02/21/25 0800    budesonide (PULMICORT) neb solution 1 mg  1 mg Nebulization BID Denver Shipman MD   1 mg at 02/22/25 0716    calcitRIOL (ROCALTROL) capsule 0.5 mcg  0.5 mcg Oral Once per day on Monday Wednesday Friday Chandu Rodriguez MD        cetirizine (zyrTEC) tablet 5 mg  5 mg Oral or Feeding Tube At Bedtime Denver Shipman MD   5 mg at 02/21/25 2108    cinacalcet (SENSIPAR) tablet 30 mg  30 mg Oral Once per day on Monday Wednesday Friday Chandu Rodriguez MD   30 mg at 02/21/25 1236    digoxin (LANOXIN) half-tab 62.5 mcg  62.5 mcg Oral Daily Kamryn Selby PA-C   62.5 mcg at 02/21/25 1236    furosemide (LASIX) tablet 80 mg  80 mg Oral or Feeding Tube Daily Enu-Vivian Samaniego MD   80 mg at 02/20/25 0829    hydrALAZINE (APRESOLINE)  tablet 75 mg  75 mg Oral Q8H [facility] Carly Faye DO   75 mg at 02/22/25 0602    insulin aspart (NovoLOG) injection (RAPID ACTING)  1-7 Units Subcutaneous Q4H Carly Faye DO   1 Units at 02/22/25 0602    insulin glargine (LANTUS PEN) injection 15 Units  15 Units Subcutaneous BID Vivian Russell MD   15 Units at 02/21/25 2108    ipratropium (ATROVENT) 0.02 % neb solution 0.5 mg  0.5 mg Nebulization 4x daily Johanne Crowell MD   0.5 mg at 02/22/25 0716    levalbuterol (XOPENEX) neb solution 1.25 mg  1.25 mg Nebulization 4x daily Johanne Crowell MD   1.25 mg at 02/22/25 0716    miconazole (MICATIN) 2 % powder   Topical BID Denver Shipman MD   Given at 02/21/25 2109    midodrine (PROAMATINE) tablet 5 mg  5 mg Oral Once per day on Monday Wednesday Friday Vivian Russell MD   5 mg at 02/21/25 0801    pantoprazole (PROTONIX) 2 mg/mL suspension 40 mg  40 mg Per Feeding Tube SUNIL Denver Mays MD   40 mg at 02/18/25 0942    Or    pantoprazole (PROTONIX) IV push injection 40 mg  40 mg Intravenous Denver Avery MD   40 mg at 02/22/25 0602    polyethylene glycol (MIRALAX) Packet 17 g  17 g Oral or Feeding Tube Daily Liset Romero MD   17 g at 02/21/25 1237    Prosource TF20 ENfit Compatibl EN LIQD (PROSOURCE TF20) packet 60 mL  1 packet Per Feeding Tube Daily Truman Moreland MD   60 mL at 02/21/25 1250    QUEtiapine (SEROquel) tablet 50 mg  50 mg Oral or Feeding Tube At Bedtime Cheo Watt MD   50 mg at 02/21/25 2108    sennosides (SENOKOT) syrup 10 mL  10 mL Oral or Feeding Tube BID Liset Romero MD   10 mL at 02/21/25 1241    sertraline (ZOLOFT) tablet 75 mg  75 mg Oral or Feeding Tube QPM Denver Shipman MD   75 mg at 02/21/25 2107    sodium chloride (PF) 0.9% PF flush 3 mL  3 mL Intracatheter Q8H Celine Plata, PENELOPE CNP   3 mL at 02/22/25 0603    tacrolimus (PROTOPIC) 0.1 % ointment   Topical BID Denver Shipman MD   Given at 02/21/25 2109     vitamin D3 (CHOLECALCIFEROL) tablet 50 mcg  50 mcg Oral or Feeding Tube Daily Denver Shipman MD   50 mcg at 02/21/25 0800     PRN Meds:   Current Facility-Administered Medications   Medication Dose Route Frequency Provider Last Rate Last Admin    acetaminophen (TYLENOL) tablet 650 mg  650 mg Oral Q4H PRN Denver Shipman MD   650 mg at 02/21/25 0810    Or    acetaminophen (TYLENOL) Suppository 650 mg  650 mg Rectal Q4H PRN Denver Shipman MD        albumin human 25 % injection 50 mL  50 mL Intravenous Q1H PRN Braden Oneill MD        albuterol (PROVENTIL HFA/VENTOLIN HFA) inhaler  2 puff Inhalation Q6H PRN Denver Shipman MD        benzocaine-menthol (CHLORASEPTIC) 6-10 MG lozenge 1 lozenge  1 lozenge Buccal Q1H PRN Carly Faye DO        bisacodyl (DULCOLAX) suppository 10 mg  10 mg Rectal Daily PRN Liset Romero MD        calcium carbonate (TUMS) chewable tablet 1,000 mg  1,000 mg Oral 4x Daily PRN Denver Shipman MD   1,000 mg at 02/05/25 1526    carboxymethylcellulose PF (REFRESH PLUS) 0.5 % ophthalmic solution 1 drop  1 drop Both Eyes Q1H PRN Denver Shipman MD        dextrose 10% infusion   Intravenous Continuous PRN Denver Shipman MD        glucose gel 15-30 g  15-30 g Oral Q15 Min PRN Denver Shipman MD        Or    dextrose 50 % injection 25-50 mL  25-50 mL Intravenous Q15 Min PRN Denver Shipman MD        Or    glucagon injection 1 mg  1 mg Subcutaneous Q15 Min PRN Denver Shipman MD        guaiFENesin-dextromethorphan (ROBITUSSIN DM) 100-10 MG/5ML syrup 10 mL  10 mL Oral Q4H PRN Carly Faye DO   10 mL at 02/20/25 1748    hydrALAZINE (APRESOLINE) injection 10 mg  10 mg Intravenous Q6H PRN Carly Faye DO        HYDROmorphone (DILAUDID) injection 0.2 mg  0.2 mg Intravenous Q2H RADHAN Carly Faye DO   0.2 mg at 02/19/25 1532    ipratropium - albuterol 0.5 mg/2.5 mg/3 mL (DUONEB) neb solution 3 mL  3 mL Nebulization  Q4H PRN Ted Proctor, PENELOPE CNP        lidocaine (LMX4) cream   Topical Q1H PRN Celine Plata APRN CNP        lidocaine (LMX4) cream   Topical Q1H PRN Denver Shipman MD        lidocaine 1 % 0.1-1 mL  0.1-1 mL Other Q1H PRN Celine Plata, PENELOPE CNP        lidocaine 1 % 0.1-1 mL  0.1-1 mL Other Q1H PRN Denver Shipman MD   1 mL at 02/14/25 1226    lidocaine 1 % 0.5 mL  0.5 mL Intradermal Once PRN Braden Oneill MD        lidocaine 1 % 0.5 mL  0.5 mL Intradermal Once PRN Braden Oneill MD        melatonin tablet 1 mg  1 mg Oral At Bedtime PRN Denver Shipman MD   1 mg at 02/11/25 2212    metoprolol (LOPRESSOR) injection 2.5-5 mg  2.5-5 mg Intravenous Q6H PRN Denver Shipman MD   2.5 mg at 02/20/25 1615    naloxone (NARCAN) injection 0.2 mg  0.2 mg Intravenous Q2 Min PRN Denver Shipman MD        Or    naloxone (NARCAN) injection 0.4 mg  0.4 mg Intravenous Q2 Min PRDenver Zuniga MD        Or    naloxone (NARCAN) injection 0.2 mg  0.2 mg Intramuscular Q2 Min PRDenver Zuniga MD        Or    naloxone (NARCAN) injection 0.4 mg  0.4 mg Intramuscular Q2 Min PRN Denver Shipman MD        No lozenges or gum should be given while patient on BIPAP/AVAPS/AVAPS AE   Does not apply Continuous PRN Denver Shipman MD        ondansetron (ZOFRAN ODT) ODT tab 4 mg  4 mg Oral Q6H PRN Denver Shipman MD        Or    ondansetron (ZOFRAN) injection 4 mg  4 mg Intravenous Q6H PRN Denver Shipman MD   4 mg at 02/20/25 1803    Patient may continue current oral medications   Does not apply Continuous PRN Denver Shipman MD        petrolatum-zinc oxide (SENSI-CARE) 49-15 % ointment   Topical Daily PRN Hugo Hector PA-C        phenol (CHLORASEPTIC) 1.4 % spray 1 mL  1 spray Mouth/Throat Q1H PRN Johanne Crowell MD   1 mL at 02/11/25 1736    prochlorperazine (COMPAZINE) injection 5 mg  5 mg Intravenous Q6H PRN Eren Mandel MD   5 mg at 02/07/25 0813    senna-docusate  "(SENOKOT-S/PERICOLACE) 8.6-50 MG per tablet 1 tablet  1 tablet Oral BID PRN Denver Shipman MD   1 tablet at 01/27/25 0832    Or    senna-docusate (SENOKOT-S/PERICOLACE) 8.6-50 MG per tablet 2 tablet  2 tablet Oral BID PRN Denver Shipman MD        sodium chloride (PF) 0.9% PF flush 3 mL  3 mL Intracatheter q1 min prn Celine Plata APRN CNP        sodium chloride (PF) 0.9% PF flush 3 mL  3 mL Intracatheter q1 min prn Denver Shipman MD        sodium chloride 0.9% BOLUS 1-250 mL  1-250 mL Intravenous Q1H PRN Harry Barnes MD        sodium chloride 0.9% BOLUS 100-150 mL  100-150 mL Intravenous Q15 Min PRN Braden Oneill MD        Stop Heparin 60 minutes before end of treatment   Does not apply Continuous PRN Braden Oneill MD                Data:      All new lab and imaging data was reviewed.   Recent Labs   Lab Test 02/22/25  0535 02/21/25  0530 02/19/25  0536 02/15/25  0842 02/14/25  1421 06/03/24  1644 04/24/24  0913 08/16/21  1816 04/16/21  0800   WBC 6.7 6.6 5.9   < > 4.4   < > 6.3   < > 6.4   HGB 7.6* 7.4* 7.6*   < > 8.4*   < > 10.1*   < > 10.9*    99 97   < > 98   < > 101*   < > 98    241 228   < > 184   < > 139*   < > 194   INR  --   --   --   --  1.57*  --  1.11  --  1.14    < > = values in this interval not displayed.      Recent Labs   Lab Test 02/22/25  0601 02/22/25  0535 02/22/25  0145 02/21/25  0546 02/21/25  0530 02/19/25  0643 02/19/25  0536   NA  --  138  --   --  137  --  134*   POTASSIUM  --  3.7  --   --  4.1  --  3.9   CHLORIDE  --  97*  --   --  97*  --  93*   CO2  --  30*  --   --  28  --  28   BUN  --  43.6*  --   --  66.4*  --  77.8*   CR  --  2.83*  --   --  4.02*  --  4.36*   ANIONGAP  --  11  --   --  12  --  13   JODY  --  9.6  --   --  10.3  --  10.2   * 192* 178*   < > 204*   < > 221*    < > = values in this interval not displayed.     No lab results found.    Invalid input(s): \"TROP\", \"TROPONINIES\"      "

## 2025-02-22 NOTE — PLAN OF CARE
"Patient Name: Geronimo  MRN: [id number]  Date of Admission: 1/8/2025  Reason for Admission: Flu A  Level of Care: Medsur    Vitals:   BP Readings from Last 1 Encounters:   02/22/25 (!) 145/60     Pulse Readings from Last 1 Encounters:   02/22/25 76     Wt Readings from Last 1 Encounters:   02/22/25 94.5 kg (208 lb 5.4 oz)     Ht Readings from Last 1 Encounters:   02/04/25 1.702 m (5' 7.01\")     Estimated body mass index is 32.62 kg/m  as calculated from the following:    Height as of this encounter: 1.702 m (5' 7.01\").    Weight as of this encounter: 94.5 kg (208 lb 5.4 oz).  Temp Readings from Last 1 Encounters:   02/22/25 98.3  F (36.8  C) (Oral)       Pain: Pain goal 0 Pain Rating 0 Effective pain medication/regimen N/A    CV Surgery Patient: No    Assessment    Resp: Lung sounds diminished. On 2L O2- only wore CPAP approximately 2 hours. Dyspneic on exertion  Telemetry: SR/ST  Neuro: Disoriented to time. Follows commands  GI/: anuric on HD. Continent of bowel x1  Skin/Wounds: Pale/reddened coccyx. Scattered bruising. L AKA. Peeling to R heel  Lines/Drains: PEG running TF, PIV SL  Activity: up w/ lift. Refuses repositioning at times  Sleep: fair  Abnormal Labs: Cr 2.83 hgb 7.6    Aggression Stop Light: Green          Patient Care Plan: continue plan of care. Encourage activity. TCU at discharge.   "

## 2025-02-22 NOTE — PROGRESS NOTES
Care Management Follow Up    Length of Stay (days): 45    Expected Discharge Date: 02/22/2025     Concerns to be Addressed: other (see comments) (elevated risk)     Patient plan of care discussed at interdisciplinary rounds: Yes    Anticipated Discharge Disposition: Skilled Nursing Facility              Anticipated Discharge Services: None  Anticipated Discharge DME: None    Patient/family educated on Medicare website which has current facility and service quality ratings: no  Education Provided on the Discharge Plan: No  Patient/Family in Agreement with the Plan: yes    Referrals Placed by CM/SW:    Private pay costs discussed: Not applicable    Discussed  Partnership in Safe Discharge Planning  document with patient/family: No     Handoff Completed: No, handoff not indicated or clinically appropriate    Additional Information:  SW received voicemail from [facility] at [facility] indicating pt acuity needs too high and they are not able to support at this time- referral declined.   SW updated referral determination.     Next Steps: Secure TCU.     Rafia Ugalde, BSW

## 2025-02-23 LAB
ANION GAP SERPL CALCULATED.3IONS-SCNC: 11 MMOL/L (ref 7–15)
BUN SERPL-MCNC: 66.9 MG/DL (ref 8–23)
CALCIUM SERPL-MCNC: 10.1 MG/DL (ref 8.8–10.4)
CHLORIDE SERPL-SCNC: 94 MMOL/L (ref 98–107)
CREAT SERPL-MCNC: 3.96 MG/DL (ref 0.51–0.95)
EGFRCR SERPLBLD CKD-EPI 2021: 11 ML/MIN/1.73M2
ERYTHROCYTE [DISTWIDTH] IN BLOOD BY AUTOMATED COUNT: 16.7 % (ref 10–15)
GLUCOSE BLDC GLUCOMTR-MCNC: 112 MG/DL (ref 70–99)
GLUCOSE BLDC GLUCOMTR-MCNC: 156 MG/DL (ref 70–99)
GLUCOSE BLDC GLUCOMTR-MCNC: 170 MG/DL (ref 70–99)
GLUCOSE BLDC GLUCOMTR-MCNC: 183 MG/DL (ref 70–99)
GLUCOSE BLDC GLUCOMTR-MCNC: 200 MG/DL (ref 70–99)
GLUCOSE BLDC GLUCOMTR-MCNC: 231 MG/DL (ref 70–99)
GLUCOSE SERPL-MCNC: 198 MG/DL (ref 70–99)
HCO3 SERPL-SCNC: 29 MMOL/L (ref 22–29)
HCT VFR BLD AUTO: 24 % (ref 35–47)
HGB BLD-MCNC: 7.3 G/DL (ref 11.7–15.7)
MCH RBC QN AUTO: 30.4 PG (ref 26.5–33)
MCHC RBC AUTO-ENTMCNC: 30.4 G/DL (ref 31.5–36.5)
MCV RBC AUTO: 100 FL (ref 78–100)
PLATELET # BLD AUTO: 222 10E3/UL (ref 150–450)
POTASSIUM SERPL-SCNC: 3.8 MMOL/L (ref 3.4–5.3)
RBC # BLD AUTO: 2.4 10E6/UL (ref 3.8–5.2)
SODIUM SERPL-SCNC: 134 MMOL/L (ref 135–145)
WBC # BLD AUTO: 5.4 10E3/UL (ref 4–11)

## 2025-02-23 PROCEDURE — 250N000013 HC RX MED GY IP 250 OP 250 PS 637: Performed by: INTERNAL MEDICINE

## 2025-02-23 PROCEDURE — 250N000013 HC RX MED GY IP 250 OP 250 PS 637: Performed by: HOSPITALIST

## 2025-02-23 PROCEDURE — 94640 AIRWAY INHALATION TREATMENT: CPT

## 2025-02-23 PROCEDURE — 250N000009 HC RX 250: Performed by: HOSPITALIST

## 2025-02-23 PROCEDURE — 94640 AIRWAY INHALATION TREATMENT: CPT | Mod: 76

## 2025-02-23 PROCEDURE — 36415 COLL VENOUS BLD VENIPUNCTURE: CPT | Performed by: INTERNAL MEDICINE

## 2025-02-23 PROCEDURE — 85018 HEMOGLOBIN: CPT | Performed by: INTERNAL MEDICINE

## 2025-02-23 PROCEDURE — 99232 SBSQ HOSP IP/OBS MODERATE 35: CPT | Performed by: HOSPITALIST

## 2025-02-23 PROCEDURE — 999N000157 HC STATISTIC RCP TIME EA 10 MIN

## 2025-02-23 PROCEDURE — 120N000001 HC R&B MED SURG/OB

## 2025-02-23 PROCEDURE — 80048 BASIC METABOLIC PNL TOTAL CA: CPT | Performed by: HOSPITALIST

## 2025-02-23 RX ADMIN — IPRATROPIUM BROMIDE 0.5 MG: 0.5 SOLUTION RESPIRATORY (INHALATION) at 07:08

## 2025-02-23 RX ADMIN — POLYETHYLENE GLYCOL 3350 17 G: 17 POWDER, FOR SOLUTION ORAL at 08:42

## 2025-02-23 RX ADMIN — HYDRALAZINE HYDROCHLORIDE 75 MG: 50 TABLET ORAL at 13:37

## 2025-02-23 RX ADMIN — TACROLIMUS: 1 OINTMENT TOPICAL at 11:28

## 2025-02-23 RX ADMIN — MICONAZOLE NITRATE: 2 POWDER TOPICAL at 20:34

## 2025-02-23 RX ADMIN — INSULIN GLARGINE 15 UNITS: 100 INJECTION, SOLUTION SUBCUTANEOUS at 08:49

## 2025-02-23 RX ADMIN — AMLODIPINE BESYLATE 10 MG: 10 TABLET ORAL at 18:38

## 2025-02-23 RX ADMIN — SENNOSIDES 10 ML: 8.8 LIQUID ORAL at 08:42

## 2025-02-23 RX ADMIN — MICONAZOLE NITRATE: 2 POWDER TOPICAL at 08:46

## 2025-02-23 RX ADMIN — LEVALBUTEROL HYDROCHLORIDE 1.25 MG: 1.25 SOLUTION RESPIRATORY (INHALATION) at 10:48

## 2025-02-23 RX ADMIN — Medication 5 ML: at 08:43

## 2025-02-23 RX ADMIN — IPRATROPIUM BROMIDE 0.5 MG: 0.5 SOLUTION RESPIRATORY (INHALATION) at 10:47

## 2025-02-23 RX ADMIN — PANTOPRAZOLE SODIUM 40 MG: 40 INJECTION, POWDER, FOR SOLUTION INTRAVENOUS at 06:53

## 2025-02-23 RX ADMIN — CETIRIZINE HYDROCHLORIDE 5 MG: 5 TABLET ORAL at 21:54

## 2025-02-23 RX ADMIN — FUROSEMIDE 80 MG: 40 TABLET ORAL at 08:42

## 2025-02-23 RX ADMIN — LEVALBUTEROL HYDROCHLORIDE 1.25 MG: 1.25 SOLUTION RESPIRATORY (INHALATION) at 07:08

## 2025-02-23 RX ADMIN — Medication 50 MCG: at 08:42

## 2025-02-23 RX ADMIN — APIXABAN 5 MG: 5 TABLET, FILM COATED ORAL at 20:34

## 2025-02-23 RX ADMIN — INSULIN GLARGINE 15 UNITS: 100 INJECTION, SOLUTION SUBCUTANEOUS at 20:34

## 2025-02-23 RX ADMIN — AMIODARONE HYDROCHLORIDE 200 MG: 200 TABLET ORAL at 20:34

## 2025-02-23 RX ADMIN — ATORVASTATIN CALCIUM 20 MG: 20 TABLET, FILM COATED ORAL at 20:34

## 2025-02-23 RX ADMIN — HYDRALAZINE HYDROCHLORIDE 75 MG: 50 TABLET ORAL at 21:53

## 2025-02-23 RX ADMIN — AMLODIPINE BESYLATE 10 MG: 10 TABLET ORAL at 08:42

## 2025-02-23 RX ADMIN — TACROLIMUS: 1 OINTMENT TOPICAL at 20:34

## 2025-02-23 RX ADMIN — HYDRALAZINE HYDROCHLORIDE 75 MG: 50 TABLET ORAL at 06:54

## 2025-02-23 RX ADMIN — LEVALBUTEROL HYDROCHLORIDE 1.25 MG: 1.25 SOLUTION RESPIRATORY (INHALATION) at 19:48

## 2025-02-23 RX ADMIN — Medication 60 ML: at 11:29

## 2025-02-23 RX ADMIN — QUETIAPINE FUMARATE 50 MG: 50 TABLET ORAL at 21:54

## 2025-02-23 RX ADMIN — AMIODARONE HYDROCHLORIDE 200 MG: 200 TABLET ORAL at 08:42

## 2025-02-23 RX ADMIN — SERTRALINE HYDROCHLORIDE 75 MG: 50 TABLET ORAL at 20:33

## 2025-02-23 RX ADMIN — BUDESONIDE 1 MG: 1 INHALANT ORAL at 07:08

## 2025-02-23 RX ADMIN — APIXABAN 5 MG: 5 TABLET, FILM COATED ORAL at 08:42

## 2025-02-23 RX ADMIN — ACETAMINOPHEN 650 MG: 325 TABLET, FILM COATED ORAL at 13:38

## 2025-02-23 RX ADMIN — IPRATROPIUM BROMIDE 0.5 MG: 0.5 SOLUTION RESPIRATORY (INHALATION) at 19:48

## 2025-02-23 ASSESSMENT — ACTIVITIES OF DAILY LIVING (ADL)
ADLS_ACUITY_SCORE: 87
ADLS_ACUITY_SCORE: 85
ADLS_ACUITY_SCORE: 87
ADLS_ACUITY_SCORE: 85
ADLS_ACUITY_SCORE: 87
ADLS_ACUITY_SCORE: 85
ADLS_ACUITY_SCORE: 87
ADLS_ACUITY_SCORE: 85
ADLS_ACUITY_SCORE: 87
ADLS_ACUITY_SCORE: 85
ADLS_ACUITY_SCORE: 87
ADLS_ACUITY_SCORE: 85

## 2025-02-23 NOTE — PROGRESS NOTES
[facility]  Hospitalist Progress Note        Chandu Rodriguez MD   02/23/2025        Interval History:        -No acute issues overnight; denies any active complaints; mentation stable, oriented and cheerful         Assessment and Plan:        Supriya Herr is a 74 year old female with PMHx significant for ESRD on HD, CHF, COPD, poor mobility (Lt AKA, Obesity, WC bound), DM type II, hypertension.  She was brought to the ER by EMS for evaluation of shortness of breath, diagnosed with influenza A and admitted on 1/8/25       Hospital course summary  She was intubated for worsening respiratory failure and remained on mechanical ventilator 1/9 - 1/18; reintubated (1/20 - 1/25).  Was treated with Tamiflu, IV antibiotic, steroid, has completed courses.  Hospital course complicated by A-fib RVR.  Was on amiodarone drip with eventual transition to p.o. Also required diltiazem  for rate control.  Required BiPAP--HFNC--intermittent BiPAP use.  Ongoing tube feeding and modified diet per SLP.  Delirium managed with Seroquel.  Concern for recurrent pneumonia, hospital-acquired on 2/1, restarted on antibiotics.  ID consulted, recommended discontinuing antibiotics, respiratory failure likely related to fluid overload.     Patient developed symptomatic bradycardia on 2/5/25 triggering a RRT. She was placed on dopamine drip and transferred back to the ICU. Bradycardia was likely due to rate controlling medications. Cardiology consulted with adjustment of medications. Patient has remained in NSR without recurrence of bradycardia on amio drip. While in the ICU, patient required dialysis on pressors, now transitioned to midodrine . Hypoxic and hypercarbic resp failure is much improved with dialysis.      Patient transferred back to hospitalist service on 2/8/25. Recurrent issues with atrial fib with RVR. Disposition now pending TCU placement.     Acute hypoxic respiratory failure requiring mechanical  ventilation  Influenza A pneumonia  Acute COPD exacerbation  Hospital-acquired pneumonia  Pharyngeal edema  - Initial presentation with shortness of breath, diagnosed influenza A; prolonged ICU course requiring mechanical ventilation as noted above.  *1/20 bronchoscopy and BAL: culture grew actinomyces  *CT chest showed RML infiltrate versus atelectasis but no sign of actinomycosis, intensivist felt this was likely colonization.    *Pneumonia treated with cefepime and vancomycin, then Levaquin. Completed course 1/26.  *Flu and COPD exacerbation was treated with IV steroids and tamiflu.  *2/1 had increased O2 needs, concern for new infection. repeat blood cx --negative. CT chest showed possible right lower lobe pneumonia and ID was consulted and patient was again started on vancomycin and Levaquin, then subsequently discontinued as respiratory failure was determined to be due to fluid overload as she improved with dialysis.  - Continue budesonide 1 Mg twice daily, ipratropium/xopenex 4 times daily  - weaning off O2, respiratory status stable on 2 L oxygen     Recurrent atrial fibrillation with RVR  Acute on chronic CHFpEF  Hypertension  Shock, due to sedation-resolved  Symptomatic bradycardia-Resolved  - PTA amlodipine 5 Mg BID, Coreg 25 Mg BID and Lasix.  *1/21/25 TTE: LVEF 60%, no pericardial effusion, no significant valvular disease, diastolic function indeterminate.  No WMA.  *2/2/25 had symptomatic bradycardia due to rate controlling medications requiring RRT. Placed on dopamine gtt, coreg and diltiazem were held. Her heart went back into normal sinus and only amiodarone 200mg daily was continued at that point.  *2/10/25: developed atrial fib with RVR, amiodarone gtt restarted and PO dose increased from daily to BID.  *2/12/25: restarted amlodipine and oral hydralazine for uptrending BPs  *2/15/25: recurrent atrial fib with RVR, RRT called, amiodarone gtt restarted.  - Cardiology has followed on/off due to  recurrent atrial fib and bradycardia.   - Intermittent needs for amiodarone gtt, currently on amiodarone oral 200mg BID.  - initial plan for PPM on 2/18; now discontinued per EP (signed off 2/19). No indication for PPM at this time.   - A-fib to be managed with digoxin-- switched to three times a week (2/22) per pharmacy recs  - Continue with atorvastatin 20 mg daily and Lasix 80 mg daily (hold on dialysis days)  - continue Eliquis 5mg BID  - continue amlodipine 10 mg BID, hydralazine 75mg q8h, amiodarone 200 mg BID  -gets midodrine prior to dialysis     Encephalopathy/delirium likely infectious and metabolic-improved  Intermittent twitchings  Anxiety  *1/20/25 CT head: negative for acute intracranial process; Suspected some baseline cognitive impairment.    *2/10/25: CT head: no acute intracranial process.   *2/10/25 EEG: showed moderate generalized slowing and triphasic waves which per neurology commonly seen in chronic kidney disease.   - Of note has had on and off jerking movements/twitchings during her hospital stay  - Neurology evaluated (2/11) and recommended IV thiamine supplements X 5 days  - stable since 2/11/25: AOx2-3, fluctuating mental status  - no clinical concern for generalized seizure ; will monitor clinically for now and if worsening can consider neurology reevaluation with video EEG monitoring  - Continue Seroquel 50mg qhs  - Continue PTA Zoloft and gabapentin.  - PT, OT recommend TCU     Diabetes mellitus, type 2  HbA1c 6.5% on 1/17/25. PTA Lantus 18u qd and NovoLog 5u TIDWM  - Lantus 15 units BID resumed on 2/21. Initally held for procedure   -medium resistance sliding scale insulin q4h  -Hypoglycemia protocol     ESRD on HD , wasTue-Thu-Sat, now [facility]  Anemia of chronic disease  Hyperkalemia-resolved  -Nephrology following, currently [facility] dialysis  -Hemoglobin 7-8, at baseline.  -Gets EPO with hemodialysis.  -Continue vitamin D3.    -Nephrology resumed Lasix on nondialysis days on 2/3.   "  -continue midodrine daily MWF with dialysis  -Lokelma discontinued on 2/10      Dysphagia  Suspect due to recurrent intubation. Did well with video swallow study on 1/31/25, diet advanced.  But again was made n.p.o. on 2/11  *2/16 ENT evaluated with flexible scope: mild edema of arytenoids. No candida infection of pharynx of larynx. No findings to explain dysphagia  *2/17 G tube placed with IR  - nutrition following for tube feeds      Likely cerumen impaction- resolved  2/11 reported ear pressure and some ringing  - improved with debrox drops added 2/12, finished course 2/16. Ears clear when evaluated by ENT on 2/16      DVT Prophylaxis: on Eliquis    Code Status: Full Code       Diet:   Adult Formula Drip Feeding: Continuous Ruci.cn Renal Support; Gastrostomy; Goal Rate: 65; mL/hr; From: 4:00 PM; To: 6:00 AM; run x14 hours nightly for total volume 910 mL (orders updated 2/18 to reflect new enteral access)      Disposition:   Medically Ready for Discharge: Ready Now pending TCU placement;  following for disposition    Care plan discussed with patient and nursing     Clinically Significant Risk Factors         # Hyponatremia: Lowest Na = 134 mmol/L in last 2 days, will monitor as appropriate  # Hypochloremia: Lowest Cl = 94 mmol/L in last 2 days, will monitor as appropriate      # Hypoalbuminemia: Lowest albumin = 2.8 g/dL at 1/13/2025  5:14 AM, will monitor as appropriate       # Hypertension: Noted on problem list    # Chronic heart failure with preserved ejection fraction: heart failure noted on problem list and last echo with EF >50%      # Acute Hypercapnic Respiratory Failure: based on venous blood gas results.  Continue supplemental oxygen and ventilatory support as indicated.          # DMII: A1C = 6.5 % (Ref range: <5.7 %) within past 6 months   # Obesity: Estimated body mass index is 33 kg/m  as calculated from the following:    Height as of this encounter: 1.702 m (5' 7.01\").    Weight " "as of this encounter: 95.6 kg (210 lb 12.2 oz).        # Financial/Environmental Concerns: none                            Physical Exam:      Blood pressure (!) 142/52, pulse 78, temperature 98.4  F (36.9  C), temperature source Oral, resp. rate 18, height 1.702 m (5' 7.01\"), weight 95.6 kg (210 lb 12.2 oz), SpO2 99%, not currently breastfeeding.  Vitals:    02/20/25 0425 02/22/25 0544 02/23/25 0529   Weight: 93.8 kg (206 lb 12.7 oz) 94.5 kg (208 lb 5.4 oz) 95.6 kg (210 lb 12.2 oz)     Vital Signs with Ranges  Temp:  [98  F (36.7  C)-98.6  F (37  C)] 98.4  F (36.9  C)  Pulse:  [71-78] 78  Resp:  [18] 18  BP: (127-148)/(48-69) 142/52  SpO2:  [97 %-100 %] 99 %  I/O's Last 24 hours  I/O last 3 completed shifts:  In: 480 [NG/GT:480]  Out: -     Constitutional: Alert, awake and oriented; resting comfortably in no apparent distress       Oral cavity: Moist mucosa   Cardiovascular: Normal s1 s2, regular rate and rhythm, no murmur   Lungs: B/l clear to auscultation, no wheezes or crepitations   Abdomen: Soft, nt, nd, no guarding, rigidity or rebound; BS +; G tube in place   LE : No edema on right; left AKA status   Musculoskeletal/Neuro Power 5/5 in all extremities; No focal neurological deficits noted; occasional twitchings noted   Psychiatry: normal mood and affect                Medications:        Current Facility-Administered Medications   Medication Dose Route Frequency Provider Last Rate Last Admin    - MEDICATION INSTRUCTIONS for Dialysis Patients -   Does not apply See Admin Instructions Akosua Garcia, [facility]        amiodarone (PACERONE) tablet 200 mg  200 mg Oral BID Carly Faye DO   200 mg at 02/22/25 2035    amLODIPine (NORVASC) tablet 10 mg  10 mg Oral BID Braden Oneill MD   10 mg at 02/22/25 1718    apixaban ANTICOAGULANT (ELIQUIS) tablet 5 mg  5 mg Oral or Feeding Tube BID Don Trammell MD   5 mg at 02/22/25 2035    atorvastatin (LIPITOR) tablet 20 mg  20 mg Oral or Feeding Tube " QPM Denver Shipman MD   20 mg at 02/22/25 2035    B and C vitamin Complex with folic acid (NEPHRONEX) liquid 5 mL  5 mL Per Feeding Tube Daily Denver Shipman MD   5 mL at 02/22/25 0935    budesonide (PULMICORT) neb solution 1 mg  1 mg Nebulization BID Denver Shipman MD   1 mg at 02/23/25 0708    calcitRIOL (ROCALTROL) capsule 0.5 mcg  0.5 mcg Oral Once per day on Monday Wednesday Friday Chandu Rodriguez MD        cetirizine (zyrTEC) tablet 5 mg  5 mg Oral or Feeding Tube At Bedtime Denver Shipman MD   5 mg at 02/22/25 2246    cinacalcet (SENSIPAR) tablet 30 mg  30 mg Oral Once per day on Monday Wednesday Friday Chandu Rodriguez MD   30 mg at 02/21/25 1236    [START ON 2/24/2025] digoxin (LANOXIN) half-tab 62.5 mcg  62.5 mcg Oral Once per day on Monday Wednesday Friday Chandu Rodriguez MD        furosemide (LASIX) tablet 80 mg  80 mg Oral or Feeding Tube Daily Mohsenu-Vivian Samaniego MD   80 mg at 02/22/25 0931    hydrALAZINE (APRESOLINE) tablet 75 mg  75 mg Oral Q8H [facility] Carly Faye, DO   75 mg at 02/23/25 0654    insulin aspart (NovoLOG) injection (RAPID ACTING)  1-7 Units Subcutaneous Q4H Carly Faye, DO   2 Units at 02/23/25 0653    insulin glargine (LANTUS PEN) injection 15 Units  15 Units Subcutaneous BID Vivian Russell MD   15 Units at 02/22/25 2036    ipratropium (ATROVENT) 0.02 % neb solution 0.5 mg  0.5 mg Nebulization 4x daily Johanne Crowell MD   0.5 mg at 02/23/25 0708    levalbuterol (XOPENEX) neb solution 1.25 mg  1.25 mg Nebulization 4x daily Johanne Crowell MD   1.25 mg at 02/23/25 0708    miconazole (MICATIN) 2 % powder   Topical BID Denver Shipman MD   Given at 02/22/25 2036    midodrine (PROAMATINE) tablet 5 mg  5 mg Oral Once per day on Monday Wednesday Friday Vivian Russell MD   5 mg at 02/21/25 0801    pantoprazole (PROTONIX) 2 mg/mL suspension 40 mg  40 mg Per Feeding Tube [facility] Denver Shipman MD   40 mg at 02/18/25 0954     Or    pantoprazole (PROTONIX) IV push injection 40 mg  40 mg Intravenous QAM AC Denver Shipman MD   40 mg at 02/23/25 0653    polyethylene glycol (MIRALAX) Packet 17 g  17 g Oral or Feeding Tube Daily Liset Romero MD   17 g at 02/22/25 0931    Prosource TF20 [employer] Compatibl EN LIQD (PROSOURCE TF20) packet 60 mL  1 packet Per Feeding Tube Daily Truman Moreland MD   60 mL at 02/22/25 1056    QUEtiapine (SEROquel) tablet 50 mg  50 mg Oral or Feeding Tube At Bedtime Cheo Watt MD   50 mg at 02/22/25 2245    sennosides (SENOKOT) syrup 10 mL  10 mL Oral or Feeding Tube BID Liset Romero MD   10 mL at 02/22/25 0931    sertraline (ZOLOFT) tablet 75 mg  75 mg Oral or Feeding Tube QPM Denver Shipman MD   75 mg at 02/22/25 2035    sodium chloride (PF) 0.9% PF flush 3 mL  3 mL Intracatheter Q8H Celine Plata APRN CNP   3 mL at 02/23/25 0705    tacrolimus (PROTOPIC) 0.1 % ointment   Topical BID Denver Shipman MD   Given at 02/22/25 2036    vitamin D3 (CHOLECALCIFEROL) tablet 50 mcg  50 mcg Oral or Feeding Tube Daily Denver hSipman MD   50 mcg at 02/22/25 0931     PRN Meds:   Current Facility-Administered Medications   Medication Dose Route Frequency Provider Last Rate Last Admin    acetaminophen (TYLENOL) tablet 650 mg  650 mg Oral Q4H PRN Denver Shipman MD   650 mg at 02/22/25 1747    Or    acetaminophen (TYLENOL) Suppository 650 mg  650 mg Rectal Q4H PRN Denver Shipman MD        albumin human 25 % injection 50 mL  50 mL Intravenous Q1H PRN Braden Oneill MD        albuterol (PROVENTIL HFA/VENTOLIN HFA) inhaler  2 puff Inhalation Q6H PRN Denver Shipman MD        benzocaine-menthol (CHLORASEPTIC) 6-10 MG lozenge 1 lozenge  1 lozenge Buccal Q1H PRN Carly Faye DO        bisacodyl (DULCOLAX) suppository 10 mg  10 mg Rectal Daily PRN Liset Romero MD        calcium carbonate (TUMS) chewable tablet 1,000 mg  1,000 mg Oral 4x Daily PRN Denver Shipman MD    1,000 mg at 02/05/25 1526    carboxymethylcellulose PF (REFRESH PLUS) 0.5 % ophthalmic solution 1 drop  1 drop Both Eyes Q1H PRN Denver Shipman MD        dextrose 10% infusion   Intravenous Continuous PRN Denver Shipman MD        glucose gel 15-30 g  15-30 g Oral Q15 Min PRN Denver Shipman MD        Or    dextrose 50 % injection 25-50 mL  25-50 mL Intravenous Q15 Min PRN Denver Shipman MD        Or    glucagon injection 1 mg  1 mg Subcutaneous Q15 Min PRN Denver Shipman MD        guaiFENesin-dextromethorphan (ROBITUSSIN DM) 100-10 MG/5ML syrup 10 mL  10 mL Oral Q4H PRN Carly Faye, DO   10 mL at 02/20/25 1748    hydrALAZINE (APRESOLINE) injection 10 mg  10 mg Intravenous Q6H PRN Carly Faye DO        HYDROmorphone (DILAUDID) injection 0.2 mg  0.2 mg Intravenous Q2H PRN Carly Faye DO   0.2 mg at 02/19/25 1532    ipratropium - albuterol 0.5 mg/2.5 mg/3 mL (DUONEB) neb solution 3 mL  3 mL Nebulization Q4H PRN Ted Proctor APRN CNP        lidocaine (LMX4) cream   Topical Q1H PRN Celine Plata APRN CNP        lidocaine (LMX4) cream   Topical Q1H PRN Denver Shipman MD        lidocaine 1 % 0.1-1 mL  0.1-1 mL Other Q1H PRN Celine Plata APRN CNP        lidocaine 1 % 0.1-1 mL  0.1-1 mL Other Q1H PRN Denver Shipman MD   1 mL at 02/14/25 1226    lidocaine 1 % 0.5 mL  0.5 mL Intradermal Once PRN Braden Oneill MD        lidocaine 1 % 0.5 mL  0.5 mL Intradermal Once PRN Braden Oneill MD        melatonin tablet 1 mg  1 mg Oral At Bedtime PRN Denver Shipman MD   1 mg at 02/11/25 2212    metoprolol (LOPRESSOR) injection 2.5-5 mg  2.5-5 mg Intravenous Q6H PRN Denver Shipman MD   2.5 mg at 02/20/25 1615    naloxone (NARCAN) injection 0.2 mg  0.2 mg Intravenous Q2 Min PRN Denver Shipman MD        Or    naloxone (NARCAN) injection 0.4 mg  0.4 mg Intravenous Q2 Min PRN Denver Shipman MD        Or    naloxone (NARCAN) injection 0.2  mg  0.2 mg Intramuscular Q2 Min PRN Denver Shipman MD        Or    naloxone (NARCAN) injection 0.4 mg  0.4 mg Intramuscular Q2 Min PRN Denver Shipman MD        No lozenges or gum should be given while patient on BIPAP/AVAPS/AVAPS AE   Does not apply Continuous PRN Denver Shipman MD        ondansetron (ZOFRAN ODT) ODT tab 4 mg  4 mg Oral Q6H PRN Denver Shipman MD        Or    ondansetron (ZOFRAN) injection 4 mg  4 mg Intravenous Q6H PRN Denver Shipman MD   4 mg at 02/20/25 1803    Patient may continue current oral medications   Does not apply Continuous PRN Denver Shipman MD        petrolatum-zinc oxide (SENSI-CARE) 49-15 % ointment   Topical Daily PRN Hugo Hector PA-C        phenol (CHLORASEPTIC) 1.4 % spray 1 mL  1 spray Mouth/Throat Q1H PRN Johanne Crowell MD   1 mL at 02/11/25 1736    prochlorperazine (COMPAZINE) injection 5 mg  5 mg Intravenous Q6H PRN Eren Mandel MD   5 mg at 02/07/25 0813    senna-docusate (SENOKOT-S/PERICOLACE) 8.6-50 MG per tablet 1 tablet  1 tablet Oral BID PRN Denver Shipman MD   1 tablet at 01/27/25 0832    Or    senna-docusate (SENOKOT-S/PERICOLACE) 8.6-50 MG per tablet 2 tablet  2 tablet Oral BID PRN Denver Shipman MD        sodium chloride (PF) 0.9% PF flush 3 mL  3 mL Intracatheter q1 min prn Celine Plata APRN CNP        sodium chloride (PF) 0.9% PF flush 3 mL  3 mL Intracatheter q1 min prn Denver Shipman MD        sodium chloride 0.9% BOLUS 1-250 mL  1-250 mL Intravenous Q1H PRN Harry Barnes MD        sodium chloride 0.9% BOLUS 100-150 mL  100-150 mL Intravenous Q15 Min PRN Braden Oneill MD        Stop Heparin 60 minutes before end of treatment   Does not apply Continuous PRN Braden Oneill MD                Data:      All new lab and imaging data was reviewed.   Recent Labs   Lab Test 02/23/25  0452 02/22/25  0535 02/21/25  0530 02/15/25  0842 02/14/25  1421 06/03/24  1644 04/24/24  0913 08/16/21  1816  "04/16/21  0800   WBC 5.4 6.7 6.6   < > 4.4   < > 6.3   < > 6.4   HGB 7.3* 7.6* 7.4*   < > 8.4*   < > 10.1*   < > 10.9*    100 99   < > 98   < > 101*   < > 98    245 241   < > 184   < > 139*   < > 194   INR  --   --   --   --  1.57*  --  1.11  --  1.14    < > = values in this interval not displayed.      Recent Labs   Lab Test 02/23/25  0653 02/23/25  0452 02/23/25  0202 02/22/25  0601 02/22/25  0535 02/21/25  0546 02/21/25  0530   NA  --  134*  --   --  138  --  137   POTASSIUM  --  3.8  --   --  3.7  --  4.1   CHLORIDE  --  94*  --   --  97*  --  97*   CO2  --  29  --   --  30*  --  28   BUN  --  66.9*  --   --  43.6*  --  66.4*   CR  --  3.96*  --   --  2.83*  --  4.02*   ANIONGAP  --  11  --   --  11  --  12   JODY  --  10.1  --   --  9.6  --  10.3   * 198* 156*   < > 192*   < > 204*    < > = values in this interval not displayed.     No lab results found.    Invalid input(s): \"TROP\", \"TROPONINIES\"      "

## 2025-02-23 NOTE — PROGRESS NOTES
[id number]  Orientations: A&Ox3-4- int confusion and forgetful.  Vitals/Pain: VSS on 2 L NC, patient states this is baseline. Requested PRN tylenol for headache.  Tele: SR  Lines/Drains: R PIV, SL. L AV fistula WDL. G tube  Skin/Wounds: pale and reddened bottom, with small opening under mepilex. Scattered bruising. Existing L AKA. Peeling to RLE heel/foot.   GI/: small incontinent void. + BM smear x2. Int tube feeds vias G tube (4 pm - 6 am). Meds via G tube= NPO.   Labs: Abnormal/Trends, Electrolyte Replacement- n/a  Ambulation/Assist: x2 lift. Repositioning encouraged, but often refused. Patient educated on importance. Patient refuses up in chair today.   Plan: Activity encouraged. Pending TCU placement. Continue plan of care.

## 2025-02-23 NOTE — PLAN OF CARE
"Patient Name: Geronimo  MRN: [id number]  Date of Admission: 1/8/2025  Reason for Admission: Flu A  Level of Care: Medsur     Vitals:       BP Readings from Last 1 Encounters:   02/22/25 (!) 145/60          Pulse Readings from Last 1 Encounters:   02/22/25 76          Wt Readings from Last 1 Encounters:   02/22/25 94.5 kg (208 lb 5.4 oz)          Ht Readings from Last 1 Encounters:   02/04/25 1.702 m (5' 7.01\")      Estimated body mass index is 32.62 kg/m  as calculated from the following:    Height as of this encounter: 1.702 m (5' 7.01\").    Weight as of this encounter: 94.5 kg (208 lb 5.4 oz).      Temp Readings from Last 1 Encounters:   02/22/25 98.3  F (36.8  C) (Oral)         Pain: Pain goal 0 Pain Rating 0 Effective pain medication/regimen N/A     CV Surgery Patient: No     Assessment     Resp: Lung sounds diminished. On 2L O2, refused CPAP overnight. Dyspneic on exertion  Telemetry: SR/ST  Neuro: Disoriented to time. Follows commands  GI/: anuric on HD. Bowels active, no BM  Skin/Wounds: Pale/reddened coccyx. Scattered bruising. L AKA. Peeling to R heel  Lines/Drains: PEG running TF, PIV SL  Activity: up w/ lift. Refuses repositioning at times  Sleep: fair  Abnormal Labs: Cr 3.96 hgb 7.3     Aggression Stop Light: Green          Patient Care Plan: continue plan of care. Encourage activity. TCU at discharge.   "

## 2025-02-24 ENCOUNTER — APPOINTMENT (OUTPATIENT)
Dept: SPEECH THERAPY | Facility: CLINIC | Age: 75
DRG: 207 | End: 2025-02-24
Attending: PHYSICIAN ASSISTANT
Payer: COMMERCIAL

## 2025-02-24 LAB
GLUCOSE BLDC GLUCOMTR-MCNC: 154 MG/DL (ref 70–99)
GLUCOSE BLDC GLUCOMTR-MCNC: 155 MG/DL (ref 70–99)
GLUCOSE BLDC GLUCOMTR-MCNC: 173 MG/DL (ref 70–99)
GLUCOSE BLDC GLUCOMTR-MCNC: 194 MG/DL (ref 70–99)
GLUCOSE BLDC GLUCOMTR-MCNC: 200 MG/DL (ref 70–99)
GLUCOSE BLDC GLUCOMTR-MCNC: 89 MG/DL (ref 70–99)

## 2025-02-24 PROCEDURE — 250N000013 HC RX MED GY IP 250 OP 250 PS 637: Performed by: HOSPITALIST

## 2025-02-24 PROCEDURE — 90937 HEMODIALYSIS REPEATED EVAL: CPT

## 2025-02-24 PROCEDURE — 92526 ORAL FUNCTION THERAPY: CPT | Mod: GN | Performed by: SPEECH-LANGUAGE PATHOLOGIST

## 2025-02-24 PROCEDURE — 90935 HEMODIALYSIS ONE EVALUATION: CPT | Performed by: INTERNAL MEDICINE

## 2025-02-24 PROCEDURE — 250N000009 HC RX 250: Performed by: HOSPITALIST

## 2025-02-24 PROCEDURE — 258N000003 HC RX IP 258 OP 636: Performed by: INTERNAL MEDICINE

## 2025-02-24 PROCEDURE — 94640 AIRWAY INHALATION TREATMENT: CPT

## 2025-02-24 PROCEDURE — 250N000011 HC RX IP 250 OP 636: Performed by: INTERNAL MEDICINE

## 2025-02-24 PROCEDURE — 94640 AIRWAY INHALATION TREATMENT: CPT | Mod: 76

## 2025-02-24 PROCEDURE — 250N000013 HC RX MED GY IP 250 OP 250 PS 637: Performed by: INTERNAL MEDICINE

## 2025-02-24 PROCEDURE — 634N000001 HC RX 634: Mod: JZ | Performed by: INTERNAL MEDICINE

## 2025-02-24 PROCEDURE — 99232 SBSQ HOSP IP/OBS MODERATE 35: CPT | Performed by: HOSPITALIST

## 2025-02-24 PROCEDURE — 999N000157 HC STATISTIC RCP TIME EA 10 MIN

## 2025-02-24 PROCEDURE — 120N000001 HC R&B MED SURG/OB

## 2025-02-24 RX ORDER — HEPARIN SODIUM 1000 [USP'U]/ML
500 INJECTION, SOLUTION INTRAVENOUS; SUBCUTANEOUS CONTINUOUS
Status: DISCONTINUED | OUTPATIENT
Start: 2025-02-24 | End: 2025-02-26

## 2025-02-24 RX ORDER — ALBUMIN (HUMAN) 12.5 G/50ML
50 SOLUTION INTRAVENOUS
Status: DISCONTINUED | OUTPATIENT
Start: 2025-02-24 | End: 2025-02-26

## 2025-02-24 RX ORDER — CALCITRIOL 1 UG/ML
0.5 SOLUTION ORAL
Status: DISCONTINUED | OUTPATIENT
Start: 2025-02-24 | End: 2025-02-26 | Stop reason: HOSPADM

## 2025-02-24 RX ADMIN — HYDRALAZINE HYDROCHLORIDE 75 MG: 50 TABLET ORAL at 21:57

## 2025-02-24 RX ADMIN — LEVALBUTEROL HYDROCHLORIDE 1.25 MG: 1.25 SOLUTION RESPIRATORY (INHALATION) at 07:17

## 2025-02-24 RX ADMIN — TACROLIMUS: 1 OINTMENT TOPICAL at 10:40

## 2025-02-24 RX ADMIN — AMLODIPINE BESYLATE 10 MG: 10 TABLET ORAL at 21:58

## 2025-02-24 RX ADMIN — TACROLIMUS: 1 OINTMENT TOPICAL at 21:38

## 2025-02-24 RX ADMIN — Medication 5 ML: at 10:49

## 2025-02-24 RX ADMIN — ATORVASTATIN CALCIUM 20 MG: 20 TABLET, FILM COATED ORAL at 21:58

## 2025-02-24 RX ADMIN — IRON SUCROSE 50 MG: 20 INJECTION, SOLUTION INTRAVENOUS at 11:27

## 2025-02-24 RX ADMIN — HYDRALAZINE HYDROCHLORIDE 75 MG: 50 TABLET ORAL at 15:27

## 2025-02-24 RX ADMIN — AMIODARONE HYDROCHLORIDE 200 MG: 200 TABLET ORAL at 21:58

## 2025-02-24 RX ADMIN — MICONAZOLE NITRATE: 2 POWDER TOPICAL at 21:38

## 2025-02-24 RX ADMIN — QUETIAPINE FUMARATE 50 MG: 50 TABLET ORAL at 21:58

## 2025-02-24 RX ADMIN — APIXABAN 5 MG: 5 TABLET, FILM COATED ORAL at 10:39

## 2025-02-24 RX ADMIN — APIXABAN 5 MG: 5 TABLET, FILM COATED ORAL at 21:58

## 2025-02-24 RX ADMIN — HYDRALAZINE HYDROCHLORIDE 75 MG: 50 TABLET ORAL at 06:35

## 2025-02-24 RX ADMIN — CETIRIZINE HYDROCHLORIDE 5 MG: 5 TABLET ORAL at 21:58

## 2025-02-24 RX ADMIN — MICONAZOLE NITRATE: 2 POWDER TOPICAL at 10:40

## 2025-02-24 RX ADMIN — IPRATROPIUM BROMIDE 0.5 MG: 0.5 SOLUTION RESPIRATORY (INHALATION) at 19:44

## 2025-02-24 RX ADMIN — INSULIN GLARGINE 15 UNITS: 100 INJECTION, SOLUTION SUBCUTANEOUS at 21:59

## 2025-02-24 RX ADMIN — ACETAMINOPHEN 650 MG: 325 TABLET, FILM COATED ORAL at 15:27

## 2025-02-24 RX ADMIN — CINACALCET 30 MG: 30 TABLET ORAL at 10:39

## 2025-02-24 RX ADMIN — CALCITRIOL 0.5 MCG: 1 SOLUTION ORAL at 15:27

## 2025-02-24 RX ADMIN — HEPARIN SODIUM 500 UNITS: 1000 INJECTION INTRAVENOUS; SUBCUTANEOUS at 11:31

## 2025-02-24 RX ADMIN — INSULIN GLARGINE 15 UNITS: 100 INJECTION, SOLUTION SUBCUTANEOUS at 10:41

## 2025-02-24 RX ADMIN — ACETAMINOPHEN 650 MG: 325 TABLET, FILM COATED ORAL at 06:35

## 2025-02-24 RX ADMIN — SODIUM CHLORIDE 250 ML: 0.9 INJECTION, SOLUTION INTRAVENOUS at 11:32

## 2025-02-24 RX ADMIN — DIGOXIN 62.5 MCG: 125 TABLET ORAL at 15:27

## 2025-02-24 RX ADMIN — IPRATROPIUM BROMIDE 0.5 MG: 0.5 SOLUTION RESPIRATORY (INHALATION) at 07:17

## 2025-02-24 RX ADMIN — IPRATROPIUM BROMIDE 0.5 MG: 0.5 SOLUTION RESPIRATORY (INHALATION) at 10:50

## 2025-02-24 RX ADMIN — PANTOPRAZOLE SODIUM 40 MG: 40 INJECTION, POWDER, FOR SOLUTION INTRAVENOUS at 06:35

## 2025-02-24 RX ADMIN — SERTRALINE HYDROCHLORIDE 75 MG: 50 TABLET ORAL at 21:58

## 2025-02-24 RX ADMIN — LEVALBUTEROL HYDROCHLORIDE 1.25 MG: 1.25 SOLUTION RESPIRATORY (INHALATION) at 10:52

## 2025-02-24 RX ADMIN — LEVALBUTEROL HYDROCHLORIDE 1.25 MG: 1.25 SOLUTION RESPIRATORY (INHALATION) at 19:44

## 2025-02-24 RX ADMIN — Medication 60 ML: at 15:26

## 2025-02-24 RX ADMIN — AMIODARONE HYDROCHLORIDE 200 MG: 200 TABLET ORAL at 10:39

## 2025-02-24 RX ADMIN — SODIUM CHLORIDE 200 ML: 9 INJECTION, SOLUTION INTRAVENOUS at 11:33

## 2025-02-24 RX ADMIN — MIDODRINE HYDROCHLORIDE 5 MG: 5 TABLET ORAL at 10:39

## 2025-02-24 RX ADMIN — EPOETIN ALFA-EPBX 4000 UNITS: 4000 INJECTION, SOLUTION INTRAVENOUS; SUBCUTANEOUS at 11:26

## 2025-02-24 RX ADMIN — Medication 50 MCG: at 10:39

## 2025-02-24 RX ADMIN — HEPARIN SODIUM 500 UNITS/HR: 1000 INJECTION INTRAVENOUS; SUBCUTANEOUS at 11:31

## 2025-02-24 RX ADMIN — AMLODIPINE BESYLATE 10 MG: 10 TABLET ORAL at 15:27

## 2025-02-24 RX ADMIN — BUDESONIDE 1 MG: 1 INHALANT ORAL at 07:18

## 2025-02-24 ASSESSMENT — ACTIVITIES OF DAILY LIVING (ADL)
ADLS_ACUITY_SCORE: 87
ADLS_ACUITY_SCORE: 85
ADLS_ACUITY_SCORE: 87
ADLS_ACUITY_SCORE: 85

## 2025-02-24 NOTE — PROGRESS NOTES
CLINICAL NUTRITION SERVICES - REASSESSMENT NOTE     Malnutrition Status:  Patient does not meet two of the established criteria necessary for diagnosing malnutrition     Registered Dietitian Interventions:  Continue EN support as ordered     SUBJECTIVE INFORMATION  Patient not available for interview due to receiving HD    CURRENT NUTRITION ORDERS  Diet: -  Nutrition Support: Enteral:  Type of Feeding Tube: GT (placed 2/17)   Enteral Frequency:  Continuous  Enteral Regimen: [place] Renal @ 65 mL/hr x 14 hours (4 pm - 6 am)  Total Enteral Provisions: 910 mL, 1638 kcal (25 kcal/kg), 73 g protein (1.4 g/kg), 157 g CHO, 18 g fiber, 601 mL free water.   + 1 Pkt ProSource TF20 (80 kcal, 20 g protein)  TOTAL (TF + Prosource) = 1718 kcal (26 kcal/kg), 93 g protein (1.4 g/kg)   Free Water Flush: 30 mL q 4 hours     ASSESSED NUTRITION NEEDS (DW = 65.5 kg (adjusted))  Estimated Energy Needs: 3546-0328 kcals/day (25-30 kcals/kg)  Justification: Obese  Estimated Protein Needs:  grams protein/day (1.2-1.5 grams of pro/kg)  Justification: Hypercatabolism with acute illness, HD     CURRENT INTAKE/TOLERANCE  TF has been meeting % estimated needs since 2/12      NEW FINDINGS  Weight:   02/24/25 0500 95.5 kg (210 lb 8.6 oz) Bed scale   Skin/wounds: Trace Edema  GI symptoms:  BM x2 yesterday. 1-2 BM recorded most days (2/19 had x3-4)  Nutrition-relevant labs: BUN 66.9 (H), Cr 3.96 (H), GFR 11 (L) -- Labs from 2/23 HD today. -200 (H)   Nutrition-relevant medications: Nephronex, Lasix, Med sliding scale insulin + Lantus 15 units, vitamin D3     EVALUATION OF THE PROGRESS TOWARD GOALS   Previous Goals  TF @ goal to provide % estimated needs.   Evaluation: Met    Previous Nutrition Diagnosis  Inadequate oral intake related to unsafe swallow as evidenced by NPO with reliance on TF to meet 100% of estimated nutrient needs.  Evaluation: No change    NUTRITION DIAGNOSIS  Inadequate oral intake related to unsafe  swallow as evidenced by NPO with reliance on TF to meet 100% of estimated nutrient needs.    INTERVENTIONS  Enteral nutrition management    Goals  TF @ goal to provide % estimated needs.      Monitoring/Evaluation      Progress toward goals will be monitored and evaluated per policy.

## 2025-02-24 NOTE — PROGRESS NOTES
Assessment and Plan:   End-stage renal disease on hemodialysis: Her last previous 1 was 2/21/2025, Friday.      Dialysis today for 3 hours, 400 blood flow rate, 15-gauge needles with left arm fistula.  3 L ultrafiltration.  3 potassium 33 bicarb 140 sodium bath.  EPO 4000 units and Venofer 50 mg.  Low-dose heparin.    She is getting calcitriol and Sensipar.  She is also on vitamin D.            Interval History:   Hypertension: She is on amlodipine, furosemide, hydralazine.  She gets midodrine before dialysis.    Diabetes:            Review of Systems:   She complains of fatigue.  No problems on dialysis.  Her left arm fistula is working well.          Medications:     Current Facility-Administered Medications   Medication Dose Route Frequency Provider Last Rate Last Admin    - MEDICATION INSTRUCTIONS for Dialysis Patients -   Does not apply See Admin Instructions Akosua Garcia, [facility]        amiodarone (PACERONE) tablet 200 mg  200 mg Oral BID Carly Faye DO   200 mg at 02/24/25 1039    amLODIPine (NORVASC) tablet 10 mg  10 mg Oral BID Braden Oneill MD   10 mg at 02/23/25 1838    apixaban ANTICOAGULANT (ELIQUIS) tablet 5 mg  5 mg Oral or Feeding Tube BID Don Trammell MD   5 mg at 02/24/25 1039    atorvastatin (LIPITOR) tablet 20 mg  20 mg Oral or Feeding Tube QPM Denver Shipman MD   20 mg at 02/23/25 2034    B and C vitamin Complex with folic acid (NEPHRONEX) liquid 5 mL  5 mL Per Feeding Tube Daily Denver Shipman MD   5 mL at 02/24/25 1049    budesonide (PULMICORT) neb solution 1 mg  1 mg Nebulization BID Denver Shipman MD   1 mg at 02/24/25 0718    calcitRIOL (ROCALTROL) solution 0.5 mcg  0.5 mcg Oral or G tube Once per day on Monday Wednesday Friday Vivian Russell MD        cetirizine (zyrTEC) tablet 5 mg  5 mg Oral or Feeding Tube At Bedtime Denver Shipman MD   5 mg at 02/23/25 2154    cinacalcet (SENSIPAR) tablet 30 mg  30 mg Oral Once per day on  Monday Wednesday Friday Chandu Rodriguez MD   30 mg at 02/24/25 1039    digoxin (LANOXIN) half-tab 62.5 mcg  62.5 mcg Oral Once per day on Monday Wednesday Friday Chandu Rodriguez MD        furosemide (LASIX) tablet 80 mg  80 mg Oral or Feeding Tube Daily Vivian Russell MD   80 mg at 02/23/25 0842    hydrALAZINE (APRESOLINE) tablet 75 mg  75 mg Oral Q8H [facility] Carly Faye DO   75 mg at 02/24/25 0635    insulin aspart (NovoLOG) injection (RAPID ACTING)  1-7 Units Subcutaneous Q4H Carly Faye DO   1 Units at 02/24/25 1042    insulin glargine (LANTUS PEN) injection 15 Units  15 Units Subcutaneous BID Vivian Russell MD   15 Units at 02/24/25 1041    ipratropium (ATROVENT) 0.02 % neb solution 0.5 mg  0.5 mg Nebulization 4x daily Johanne Crowell MD   0.5 mg at 02/24/25 1050    levalbuterol (XOPENEX) neb solution 1.25 mg  1.25 mg Nebulization 4x daily Johanne Crowell MD   1.25 mg at 02/24/25 1052    miconazole (MICATIN) 2 % powder   Topical BID Denver Shipman MD   Given at 02/24/25 1040    midodrine (PROAMATINE) tablet 5 mg  5 mg Oral Once per day on Monday Wednesday Friday Vivian Russell MD   5 mg at 02/24/25 1039    pantoprazole (PROTONIX) 2 mg/mL suspension 40 mg  40 mg Per Feeding Tube QAM Denver Mays MD   40 mg at 02/18/25 0942    Or    pantoprazole (PROTONIX) IV push injection 40 mg  40 mg Intravenous QADenver Ellis MD   40 mg at 02/24/25 0635    polyethylene glycol (MIRALAX) Packet 17 g  17 g Oral or Feeding Tube Daily Liset Romero MD   17 g at 02/23/25 0842    Prosource TF20 ENfit Compatibl EN LIQD (PROSOURCE TF20) packet 60 mL  1 packet Per Feeding Tube Daily Truman Moreland MD   60 mL at 02/23/25 1129    QUEtiapine (SEROquel) tablet 50 mg  50 mg Oral or Feeding Tube At Bedtime Cheo Watt MD   50 mg at 02/23/25 2154    sennosides (SENOKOT) syrup 10 mL  10 mL Oral or Feeding Tube BID Liset Romero MD   10 mL at  02/23/25 0842    sertraline (ZOLOFT) tablet 75 mg  75 mg Oral or Feeding Tube QPM Denver Shipman MD   75 mg at 02/23/25 2033    sodium chloride (PF) 0.9% PF flush 3 mL  3 mL Intracatheter Q8H Celine Plata APRELIE CNP   3 mL at 02/24/25 0635    tacrolimus (PROTOPIC) 0.1 % ointment   Topical BID Denver Shipman MD   Given at 02/24/25 1040    vitamin D3 (CHOLECALCIFEROL) tablet 50 mcg  50 mcg Oral or Feeding Tube Daily Denver Shipman MD   50 mcg at 02/24/25 1039     Current Facility-Administered Medications   Medication Dose Route Frequency Provider Last Rate Last Admin    dextrose 10% infusion   Intravenous Continuous PRN Denver Shipman MD        heparin 10,000 units/10 mL infusion (DIALYSIS USE)  500 Units/hr Hemodialysis Machine Continuous Braden Oneill MD 0.5 mL/hr at 02/24/25 1131 500 Units/hr at 02/24/25 1131    heparin 10,000 units/10 mL infusion (DIALYSIS USE)  500 Units/hr Hemodialysis Machine Continuous Braden Oneill MD 0.5 mL/hr at 02/21/25 0956 500 Units/hr at 02/21/25 0956    No lozenges or gum should be given while patient on BIPAP/AVAPS/AVAPS AE   Does not apply Continuous PRN Denver Shipman MD        Patient may continue current oral medications   Does not apply Continuous PRN Denver Shipman MD        sodium chloride 0.9 % infusion   Intravenous Continuous Denver Shipman MD 10 mL/hr at 02/05/25 1831 10 mL/hr at 02/05/25 1831    Stop Heparin 60 minutes before end of treatment   Does not apply Continuous PRN Braden Oneill MD        Stop Heparin 60 minutes before end of treatment   Does not apply Continuous PRN Braden Oneill MD         Current active medications and PTA medications reviewed, see medication list for details.            Physical Exam:   Vitals were reviewed  Patient Vitals for the past 24 hrs:   BP Temp Temp src Pulse Resp SpO2 Weight   02/24/25 1200 126/51 -- -- 76 -- 98 % --   02/24/25 1145 139/54 -- -- 77 -- 98 % --   02/24/25  1131 -- -- -- 78 -- -- --   25 1130 (!) 147/72 -- -- 78 -- 98 % --   25 1115 (!) 150/49 -- -- 83 -- -- --   25 1110 (!) 143/53 98.3  F (36.8  C) Oral 83 20 96 % --   25 0945 -- -- -- -- -- 100 % --   25 0830 (!) 156/52 98.2  F (36.8  C) Axillary 75 19 -- --   25 0500 -- -- -- -- -- -- 95.5 kg (210 lb 8.6 oz)   25 0420 (!) 168/53 97.7  F (36.5  C) Axillary 80 18 98 % --   25 0058 (!) 154/48 98.1  F (36.7  C) Oral 90 18 95 % --   25 1948 -- -- -- -- -- 97 % --   25 1443 138/48 98  F (36.7  C) Oral 78 18 98 % --   25 1338 (!) 191/52 -- -- 81 -- -- --       Temp:  [97.7  F (36.5  C)-98.3  F (36.8  C)] 98.3  F (36.8  C)  Pulse:  [75-90] 76  Resp:  [18-20] 20  BP: (126-191)/(48-72) 126/51  SpO2:  [95 %-100 %] 98 %    Temperatures:  Current - Temp: 98.3  F (36.8  C); Max - Temp  Av.1  F (36.7  C)  Min: 97.7  F (36.5  C)  Max: 98.3  F (36.8  C)  Respiration range: Resp  Av.6  Min: 18  Max: 20  Pulse range: Pulse  Av.9  Min: 75  Max: 90  Blood pressure range: Systolic (24hrs), Av , Min:126 , Max:191   ; Diastolic (24hrs), Av, Min:48, Max:72    Pulse oximetry range: SpO2  Av.6 %  Min: 95 %  Max: 100 %    I/O last 3 completed shifts:  In: 2241 [P.O.:60; NG/GT:2181]  Out: -       Intake/Output Summary (Last 24 hours) at 2025 1228  Last data filed at 2025 0630  Gross per 24 hour   Intake 2121 ml   Output --   Net 2121 ml       More alert and responsive today.  Left arm fistula with needles in place  She has a feeding tube, PEG       Wt Readings from Last 4 Encounters:   25 95.5 kg (210 lb 8.6 oz)   24 101.1 kg (222 lb 14.2 oz)   24 101.1 kg (222 lb 14.2 oz)   24 101.1 kg (222 lb 14.2 oz)          Data:          Lab Results   Component Value Date     2025     2025     2025     2021     2021     2020    Lab Results   Component Value  Date    CHLORIDE 94 02/23/2025    CHLORIDE 97 02/22/2025    CHLORIDE 97 02/21/2025    CHLORIDE 106 11/24/2021    CHLORIDE 109 11/23/2021    CHLORIDE 110 11/22/2021    CHLORIDE 105 04/17/2021    CHLORIDE 101 04/09/2021    CHLORIDE 106 11/18/2020    Lab Results   Component Value Date    BUN 66.9 02/23/2025    BUN 43.6 02/22/2025    BUN 66.4 02/21/2025    BUN 37 11/24/2021    BUN 69 11/23/2021    BUN 64 11/22/2021    BUN 68 04/17/2021    BUN 53 04/09/2021    BUN 44 11/18/2020      Lab Results   Component Value Date    POTASSIUM 3.8 02/23/2025    POTASSIUM 3.7 02/22/2025    POTASSIUM 4.1 02/21/2025    POTASSIUM 4.7 02/02/2022    POTASSIUM 3.8 11/24/2021    POTASSIUM 6.6 11/23/2021    POTASSIUM 4.2 04/17/2021    POTASSIUM 3.8 04/16/2021    POTASSIUM 3.9 04/09/2021    Lab Results   Component Value Date    CO2 29 02/23/2025    CO2 30 02/22/2025    CO2 28 02/21/2025    CO2 31 11/24/2021    CO2 23 11/23/2021    CO2 24 11/22/2021    CO2 23 04/17/2021    CO2 22 04/09/2021    CO2 29 11/18/2020    Lab Results   Component Value Date    CR 3.96 02/23/2025    CR 2.83 02/22/2025    CR 4.02 02/21/2025    CR 5.98 04/17/2021    CR 4.35 04/16/2021    CR 5.28 04/09/2021        Recent Labs   Lab Test 02/23/25  0452 02/22/25  0535 02/21/25  0530   WBC 5.4 6.7 6.6   HGB 7.3* 7.6* 7.4*   HCT 24.0* 24.7* 23.8*    100 99    245 241     Recent Labs   Lab Test 02/05/25 2004 02/02/25  0542 01/26/25  1135   AST 17 12 21   ALT 24 22 61*   ALKPHOS 75 55 49   BILITOTAL 0.3 0.2 0.2       Recent Labs   Lab Test 02/17/25  0544 02/16/25  0529 02/15/25  0842   MAG 2.2 2.1 2.2     Recent Labs   Lab Test 02/17/25  0544 02/12/25  0523 02/11/25  0507   PHOS 3.4 4.8* 3.6     Recent Labs   Lab Test 02/23/25  0452 02/22/25  0535 02/21/25  0530   JODY 10.1 9.6 10.3       Lab Results   Component Value Date    JODY 10.1 02/23/2025     Lab Results   Component Value Date    WBC 5.4 02/23/2025    HGB 7.3 (L) 02/23/2025    HCT 24.0 (L) 02/23/2025    MCV  100 02/23/2025     02/23/2025     Lab Results   Component Value Date     (L) 02/23/2025    POTASSIUM 3.8 02/23/2025    CHLORIDE 94 (L) 02/23/2025    CO2 29 02/23/2025     (H) 02/24/2025     Lab Results   Component Value Date    BUN 66.9 (H) 02/23/2025    CR 3.96 (H) 02/23/2025     Lab Results   Component Value Date    MAG 2.2 02/17/2025     Lab Results   Component Value Date    PHOS 3.4 02/17/2025       Creatinine   Date Value Ref Range Status   02/23/2025 3.96 (H) 0.51 - 0.95 mg/dL Final   02/22/2025 2.83 (H) 0.51 - 0.95 mg/dL Final   02/21/2025 4.02 (H) 0.51 - 0.95 mg/dL Final   02/19/2025 4.36 (H) 0.51 - 0.95 mg/dL Final   02/18/2025 3.14 (H) 0.51 - 0.95 mg/dL Final   02/17/2025 4.70 (H) 0.51 - 0.95 mg/dL Final   04/17/2021 5.98 (H) 0.52 - 1.04 mg/dL Final   04/16/2021 4.35 (H) 0.52 - 1.04 mg/dL Final   04/09/2021 5.28 (H) 0.52 - 1.04 mg/dL Final   11/18/2020 4.23 (H) 0.52 - 1.04 mg/dL Final   11/16/2020 5.08 (H) 0.52 - 1.04 mg/dL Final   09/25/2020 6.87 (H) 0.52 - 1.04 mg/dL Final       Attestation:  I have reviewed today's vital signs, notes, medications, labs and imaging.  Seen on dialysis.     Braden Oneill MD

## 2025-02-24 NOTE — PROGRESS NOTES
Care Management Follow Up    Length of Stay (days): 47    Expected Discharge Date: 02/24/2025     Concerns to be Addressed: other (see comments) (elevated risk)     Patient plan of care discussed at interdisciplinary rounds: Yes    Anticipated Discharge Disposition: Skilled Nursing Facility              Anticipated Discharge Services: None  Anticipated Discharge DME: None    Patient/family educated on Medicare website which has current facility and service quality ratings: no  Education Provided on the Discharge Plan: No  Patient/Family in Agreement with the Plan: yes    Referrals Placed by CM/SW:    Private pay costs discussed: Not applicable    Discussed  Partnership in Safe Discharge Planning  document with patient/family: No     Handoff Completed: No, handoff not indicated or clinically appropriate    Additional Information:  SW reviewed TCU referrals- All TCU's have denied-   [facility]- bed full  [facility]- Acuity too high  [facility]- Acuity too high  [facility]- Out of Network  [facility]- Acuity too high  [facility]- bed full   [facility] - Bed full  [facility]- Acuity too high    SW called [facility] [phone] and spoke with admissions to see if any beds have opened up or will be,  SW resent the referral for them to take a look at and will follow up as they are just working on discharge pt for today.     Addendum 4632- Resent referral to [facility] for reconsideration - SW indicated that the pt is no longer on NG tube see notes from Monday 2/17 indicating removed.     SW called [facility] - [phone] and left a voicemail with Lelo in admissions following up on bed availability for jenna. SW left call back number and resent the referral via Epic.     Addendum 1001: [facility] declined - Acuity too ezekiel.     Addendum 1141: previous transport quotes   [facility]:  [phone]  $95 base fee, $2.50 per mile.  If distance is over 15 miles away then cost will increase (they did  not share by how much).     [facility]:  [phone]  $20 per assist to load and unload so round trip to dialysis would be $80 and mileage is 65 cents per mile.     Addendum 1205: SW called [facility] transport line : [phone] and provided pt insurance ID number: Pt has medical transport covered.  Representative indicated she also has Elderly Waiver.    Pt can get transport to and from Dialysis 3 x per week for about a month or two.     TCU updates: [facility] declined not beds available.    [facility]- bed full - Acuity too high   [facility]- Acuity too high  [facility]- Acuity too high  [facility]- Out of Network  [facility]- Acuity too high  [facility]- bed full - still no bed.   [facility] - Bed full  [facility]- Acuity too high    Addendum 1433:   SW reviewed medicare nursing home sight and searched for facilites with above average staffing scores and 4-5 star overall ratings and sent more referrals to the following facilites:           Addendum: 1610  SW left printed list of Medicare rated facilities with pt, along with the [employer] transportation flyer information and the [facility] reach facilities list and the  skilled nursing facilities list. Supriya and her daughter will review them tonight again and see if there area additional locations.     Next Steps: Secure TCU, Transportation, arrange for Dialysis transportation.     GENET BolivarW

## 2025-02-24 NOTE — PROGRESS NOTES
[id number]  Orientations: A&Ox3-4- int confusion and forgetful.  Vitals/Pain: VSS on 2 L NC, patient states this is baseline. Requested PRN tylenol for headache.  Tele: SR  Lines/Drains: R PIV, SL. L AV fistula WDL. G tube  Skin/Wounds: pale and reddened bottom, with small opening under mepilex. Scattered bruising. Existing L AKA. Peeling to RLE heel/foot.   GI/: small incontinent void. + BM smear x2. Int tube feeds vias G tube (4 pm - 6 am). Meds via G tube. Upgraded to easy to chew diet today.  Labs: Abnormal/Trends, Electrolyte Replacement- n/a  Ambulation/Assist: x2 lift. Repositioning encouraged, but often refused. Patient educated on importance. Patient refuses up in chair today d/t dialysis causing lethargy per patient  Plan: Activity encouraged. Pending TCU placement. Continue plan of care.

## 2025-02-24 NOTE — PROGRESS NOTES
[facility]  Hospitalist Progress Note        Chandu Rodriguez MD   02/24/2025        Interval History:        - No acute issues overnight; denies any active complaints  - planned for dialysis 2/24   - SW following for disposition to TCU, declined by several TCU's         Assessment and Plan:        Supriya Herr is a 74 year old female with PMHx significant for ESRD on HD, CHF, COPD, poor mobility (Lt AKA, Obesity, WC bound), DM type II, hypertension.  She was brought to the ER by EMS for evaluation of shortness of breath, diagnosed with influenza A and admitted on 1/8/25       Hospital course summary  She was intubated for worsening respiratory failure and remained on mechanical ventilator 1/9 - 1/18; reintubated (1/20 - 1/25).  Was treated with Tamiflu, IV antibiotic, steroid, has completed courses.  Hospital course complicated by A-fib RVR.  Was on amiodarone drip with eventual transition to p.o. Also required diltiazem  for rate control.  Required BiPAP--HFNC--intermittent BiPAP use.  Ongoing tube feeding and modified diet per SLP.  Delirium managed with Seroquel.  Concern for recurrent pneumonia, hospital-acquired on 2/1, restarted on antibiotics.  ID consulted, recommended discontinuing antibiotics, respiratory failure likely related to fluid overload.     Patient developed symptomatic bradycardia on 2/5/25 triggering a RRT. She was placed on dopamine drip and transferred back to the ICU. Bradycardia was likely due to rate controlling medications. Cardiology consulted with adjustment of medications. Patient has remained in NSR without recurrence of bradycardia on amio drip. While in the ICU, patient required dialysis on pressors, now transitioned to midodrine . Hypoxic and hypercarbic resp failure is much improved with dialysis.      Patient transferred back to hospitalist service on 2/8/25. Recurrent issues with atrial fib with RVR. Disposition now pending TCU placement.     Acute  hypoxic respiratory failure requiring mechanical ventilation  Influenza A pneumonia  Acute COPD exacerbation  Hospital-acquired pneumonia  Pharyngeal edema  - Initial presentation with shortness of breath, diagnosed influenza A; prolonged ICU course requiring mechanical ventilation as noted above.  *1/20 bronchoscopy and BAL: culture grew actinomyces  *CT chest showed RML infiltrate versus atelectasis but no sign of actinomycosis, intensivist felt this was likely colonization.    *Pneumonia treated with cefepime and vancomycin, then Levaquin. Completed course 1/26.  *Flu and COPD exacerbation was treated with IV steroids and tamiflu.  *2/1 had increased O2 needs, concern for new infection. repeat blood cx --negative. CT chest showed possible right lower lobe pneumonia and ID was consulted and patient was again started on vancomycin and Levaquin, then subsequently discontinued as respiratory failure was determined to be due to fluid overload as she improved with dialysis.  - Continue budesonide 1 Mg twice daily, ipratropium/xopenex 4 times daily  - weaning off O2, respiratory status stable on 2 L oxygen     Recurrent atrial fibrillation with RVR  Acute on chronic CHFpEF  Hypertension  Shock, due to sedation-resolved  Symptomatic bradycardia-Resolved  - PTA amlodipine 5 Mg BID, Coreg 25 Mg BID and Lasix.  *1/21/25 TTE: LVEF 60%, no pericardial effusion, no significant valvular disease, diastolic function indeterminate.  No WMA.  *2/2/25 had symptomatic bradycardia due to rate controlling medications requiring RRT. Placed on dopamine gtt, coreg and diltiazem were held. Her heart went back into normal sinus and only amiodarone 200mg daily was continued at that point.  *2/10/25: developed atrial fib with RVR, amiodarone gtt restarted and PO dose increased from daily to BID.  *2/12/25: restarted amlodipine and oral hydralazine for uptrending BPs  *2/15/25: recurrent atrial fib with RVR, RRT called, amiodarone gtt  restarted.  - Cardiology has followed on/off due to recurrent atrial fib and bradycardia.   - Intermittent needs for amiodarone gtt, currently on amiodarone oral 200mg BID.  - initial plan for PPM on 2/18; now discontinued per EP (signed off 2/19). No indication for PPM at this time.   - A-fib to be managed with digoxin-- switched to three times a week (2/22) per pharmacy recs  - Continue with atorvastatin 20 mg daily and Lasix 80 mg daily (hold on dialysis days)  - continue Eliquis 5mg BID  - continue amlodipine 10 mg BID, hydralazine 75mg q8h, amiodarone 200 mg BID  -gets midodrine prior to dialysis     Encephalopathy/delirium likely infectious and metabolic-improved  Intermittent twitchings  Anxiety  *1/20/25 CT head: negative for acute intracranial process; Suspected some baseline cognitive impairment.    *2/10/25: CT head: no acute intracranial process.   *2/10/25 EEG: showed moderate generalized slowing and triphasic waves which per neurology commonly seen in chronic kidney disease.   - Of note has had on and off jerking movements/twitchings during her hospital stay  - Neurology evaluated (2/11) and recommended IV thiamine supplements X 5 days  - stable since 2/11/25: AOx2-3, fluctuating mental status  - no clinical concern for generalized seizure ; will monitor clinically for now and if worsening can consider neurology reevaluation with video EEG monitoring  - Continue Seroquel 50mg qhs  - Continue PTA Zoloft and gabapentin.  - PT, OT recommend TCU     Diabetes mellitus, type 2  HbA1c 6.5% on 1/17/25. PTA Lantus 18u qd and NovoLog 5u TIDWM  - Lantus 15 units BID resumed on 2/21. Initally held for procedure   -medium resistance sliding scale insulin q4h  -Hypoglycemia protocol     ESRD on HD , wasTue-Thu-Sat, now [facility]  Anemia of chronic disease  Hyperkalemia-resolved  -Nephrology following, currently MWF dialysis  -Hemoglobin 7-8, at baseline.  -Gets EPO with hemodialysis.  -Continue vitamin D3.    -Nephrology  resumed Lasix on nondialysis days on 2/3.    -continue midodrine daily MWF with dialysis  -Lokelma discontinued on 2/10      Dysphagia  Suspect due to recurrent intubation. Did well with video swallow study on 1/31/25, diet advanced.  But again was made n.p.o. on 2/11  *2/16 ENT evaluated with flexible scope: mild edema of arytenoids. No candida infection of pharynx of larynx. No findings to explain dysphagia  *2/17 G tube placed with IR  - nutrition following for tube feeds      Likely cerumen impaction- resolved  2/11 reported ear pressure and some ringing  - improved with debrox drops added 2/12, finished course 2/16. Ears clear when evaluated by ENT on 2/16      DVT Prophylaxis: on Eliquis    Code Status: Full Code       Diet:   Adult Formula Drip Feeding: Continuous [employer] Renal Support; Gastrostomy; Goal Rate: 65; mL/hr; From: 4:00 PM; To: 6:00 AM; run x14 hours nightly for total volume 910 mL (orders updated 2/18 to reflect new enteral access)      Disposition:   Medically Ready for Discharge: Ready Now pending TCU placement;  following for disposition    Care plan discussed with patient and nursing     Clinically Significant Risk Factors         # Hyponatremia: Lowest Na = 134 mmol/L in last 2 days, will monitor as appropriate  # Hypochloremia: Lowest Cl = 94 mmol/L in last 2 days, will monitor as appropriate      # Hypoalbuminemia: Lowest albumin = 2.8 g/dL at 1/13/2025  5:14 AM, will monitor as appropriate       # Hypertension: Noted on problem list    # Chronic heart failure with preserved ejection fraction: heart failure noted on problem list and last echo with EF >50%      # Acute Hypercapnic Respiratory Failure: based on venous blood gas results.  Continue supplemental oxygen and ventilatory support as indicated.          # DMII: A1C = 6.5 % (Ref range: <5.7 %) within past 6 months   # Obesity: Estimated body mass index is 32.97 kg/m  as calculated from the following:    Height as of  "this encounter: 1.702 m (5' 7.01\").    Weight as of this encounter: 95.5 kg (210 lb 8.6 oz).        # Financial/Environmental Concerns: none                            Physical Exam:      Blood pressure (!) 168/53, pulse 80, temperature 97.7  F (36.5  C), temperature source Axillary, resp. rate 18, height 1.702 m (5' 7.01\"), weight 95.5 kg (210 lb 8.6 oz), SpO2 98%, not currently breastfeeding.  Vitals:    02/22/25 0544 02/23/25 0529 02/24/25 0500   Weight: 94.5 kg (208 lb 5.4 oz) 95.6 kg (210 lb 12.2 oz) 95.5 kg (210 lb 8.6 oz)     Vital Signs with Ranges  Temp:  [97.7  F (36.5  C)-98.1  F (36.7  C)] 97.7  F (36.5  C)  Pulse:  [78-90] 80  Resp:  [18] 18  BP: (138-191)/(48-53) 168/53  SpO2:  [95 %-98 %] 98 %  I/O's Last 24 hours  I/O last 3 completed shifts:  In: 2241 [P.O.:60; NG/GT:2181]  Out: -     Constitutional: Alert, awake and oriented; resting comfortably in no apparent distress       Oral cavity: Moist mucosa   Cardiovascular: Normal s1 s2, regular rate and rhythm, no murmur   Lungs: B/l clear to auscultation, no wheezes or crepitations   Abdomen: Soft, nt, nd, no guarding, rigidity or rebound; BS +; G tube in place   LE : No edema on right; left AKA status   Musculoskeletal/Neuro Power 5/5 in all extremities; No focal neurological deficits noted   Psychiatry: normal mood and affect                Medications:        Current Facility-Administered Medications   Medication Dose Route Frequency Provider Last Rate Last Admin    - MEDICATION INSTRUCTIONS for Dialysis Patients -   Does not apply See Admin Instructions Akosua Garcia, [facility]        amiodarone (PACERONE) tablet 200 mg  200 mg Oral BID Carly Faye DO   200 mg at 02/23/25 2034    amLODIPine (NORVASC) tablet 10 mg  10 mg Oral BID Braden Oneill MD   10 mg at 02/23/25 1838    apixaban ANTICOAGULANT (ELIQUIS) tablet 5 mg  5 mg Oral or Feeding Tube BID Don Trammell MD   5 mg at 02/23/25 2034    atorvastatin (LIPITOR) tablet " 20 mg  20 mg Oral or Feeding Tube QPM Denver Shipman MD   20 mg at 02/23/25 2034    B and C vitamin Complex with folic acid (NEPHRONEX) liquid 5 mL  5 mL Per Feeding Tube Daily Denver Shipman MD   5 mL at 02/23/25 0843    budesonide (PULMICORT) neb solution 1 mg  1 mg Nebulization BID Denver Shipman MD   1 mg at 02/24/25 0718    calcitRIOL (ROCALTROL) capsule 0.5 mcg  0.5 mcg Oral Once per day on Monday Wednesday Friday Chandu Rodriguez MD        cetirizine (zyrTEC) tablet 5 mg  5 mg Oral or Feeding Tube At Bedtime Denver Shipman MD   5 mg at 02/23/25 2154    cinacalcet (SENSIPAR) tablet 30 mg  30 mg Oral Once per day on Monday Wednesday Friday Chandu Rodriguez MD   30 mg at 02/21/25 1236    digoxin (LANOXIN) half-tab 62.5 mcg  62.5 mcg Oral Once per day on Monday Wednesday Friday Chandu Rodriguez MD        furosemide (LASIX) tablet 80 mg  80 mg Oral or Feeding Tube Daily Mohsenu-Vivian Samaniego MD   80 mg at 02/23/25 0842    hydrALAZINE (APRESOLINE) tablet 75 mg  75 mg Oral Q8H [facility] Carly Faye, DO   75 mg at 02/24/25 0635    insulin aspart (NovoLOG) injection (RAPID ACTING)  1-7 Units Subcutaneous Q4H Carly Faye, DO   2 Units at 02/24/25 0634    insulin glargine (LANTUS PEN) injection 15 Units  15 Units Subcutaneous BID Vivian Russell MD   15 Units at 02/23/25 2034    ipratropium (ATROVENT) 0.02 % neb solution 0.5 mg  0.5 mg Nebulization 4x daily Johanne Crowell MD   0.5 mg at 02/24/25 0717    levalbuterol (XOPENEX) neb solution 1.25 mg  1.25 mg Nebulization 4x daily Johanne Crowell MD   1.25 mg at 02/24/25 0717    miconazole (MICATIN) 2 % powder   Topical BID Denver Shipman MD   Given at 02/23/25 2034    midodrine (PROAMATINE) tablet 5 mg  5 mg Oral Once per day on Monday Wednesday Friday Vivian Russell MD   5 mg at 02/21/25 0801    pantoprazole (PROTONIX) 2 mg/mL suspension 40 mg  40 [facility] Denver Ship[facility]   40 mg at  02/18/25 0942    Or    pantoprazole (PROTONIX) IV push injection 40 mg  40 mg Intravenous QAM AC Denver Shipman MD   40 mg at 02/24/25 0635    polyethylene glycol (MIRALAX) Packet 17 g  17 g Oral or Feeding Tube Daily Liset Romero MD   17 g at 02/23/25 0842    Prosource TF20 [employer]fit Compatibl EN LIQD (PROSOURCE TF20) packet 60 mL  1 packet Per Feeding Tube Daily Truman Moreland MD   60 mL at 02/23/25 1129    QUEtiapine (SEROquel) tablet 50 mg  50 mg Oral or Feeding Tube At Bedtime Cheo Watt MD   50 mg at 02/23/25 2154    sennosides (SENOKOT) syrup 10 mL  10 mL Oral or Feeding Tube BID Liset Romero MD   10 mL at 02/23/25 0842    sertraline (ZOLOFT) tablet 75 mg  75 mg Oral or Feeding Tube QPM Denver Shipman MD   75 mg at 02/23/25 2033    sodium chloride (PF) 0.9% PF flush 3 mL  3 mL Intracatheter Q8H Celine Plata APRN CNP   3 mL at 02/24/25 0635    tacrolimus (PROTOPIC) 0.1 % ointment   Topical BID Denver Shipman MD   Given at 02/23/25 2034    vitamin D3 (CHOLECALCIFEROL) tablet 50 mcg  50 mcg Oral or Feeding Tube Daily Denver Shipman MD   50 mcg at 02/23/25 0842     PRN Meds:   Current Facility-Administered Medications   Medication Dose Route Frequency Provider Last Rate Last Admin    acetaminophen (TYLENOL) tablet 650 mg  650 mg Oral Q4H PRN Denver Shipman MD   650 mg at 02/24/25 0635    Or    acetaminophen (TYLENOL) Suppository 650 mg  650 mg Rectal Q4H PRN Denver Shipman MD        albumin human 25 % injection 50 mL  50 mL Intravenous Q1H PRN Braden Oneill MD        albuterol (PROVENTIL HFA/VENTOLIN HFA) inhaler  2 puff Inhalation Q6H PRN Denver Shipman MD        benzocaine-menthol (CHLORASEPTIC) 6-10 MG lozenge 1 lozenge  1 lozenge Buccal Q1H PRN Carly Faye DO        bisacodyl (DULCOLAX) suppository 10 mg  10 mg Rectal Daily PRN Liset Rmoero MD        calcium carbonate (TUMS) chewable tablet 1,000 mg  1,000 mg Oral 4x Daily PRN  Denver Shipman MD   1,000 mg at 02/05/25 1526    carboxymethylcellulose PF (REFRESH PLUS) 0.5 % ophthalmic solution 1 drop  1 drop Both Eyes Q1H PRN Denver Shipman MD        dextrose 10% infusion   Intravenous Continuous PRN Denver Shipman MD        glucose gel 15-30 g  15-30 g Oral Q15 Min PRN Denver Shipman MD        Or    dextrose 50 % injection 25-50 mL  25-50 mL Intravenous Q15 Min PRN Denver Shipman MD        Or    glucagon injection 1 mg  1 mg Subcutaneous Q15 Min PRN Denver Shipman MD        guaiFENesin-dextromethorphan (ROBITUSSIN DM) 100-10 MG/5ML syrup 10 mL  10 mL Oral Q4H PRN Carly Faye, DO   10 mL at 02/20/25 1748    hydrALAZINE (APRESOLINE) injection 10 mg  10 mg Intravenous Q6H PRN Carly Faye DO        HYDROmorphone (DILAUDID) injection 0.2 mg  0.2 mg Intravenous Q2H PRN Carly Faye, DO   0.2 mg at 02/19/25 1532    ipratropium - albuterol 0.5 mg/2.5 mg/3 mL (DUONEB) neb solution 3 mL  3 mL Nebulization Q4H PRN Ted Proctor APRN CNP        lidocaine (LMX4) cream   Topical Q1H PRN Celine Plata APRN CNP        lidocaine (LMX4) cream   Topical Q1H PRN Denver Shipman MD        lidocaine 1 % 0.1-1 mL  0.1-1 mL Other Q1H PRN Celine Plata APRN CNP        lidocaine 1 % 0.1-1 mL  0.1-1 mL Other Q1H PRN Denver Shipman MD   1 mL at 02/14/25 1226    lidocaine 1 % 0.5 mL  0.5 mL Intradermal Once PRN Braden Oneill MD        lidocaine 1 % 0.5 mL  0.5 mL Intradermal Once PRN Braden Oneill MD        melatonin tablet 1 mg  1 mg Oral At Bedtime PRN Denver Shipman MD   1 mg at 02/11/25 2212    metoprolol (LOPRESSOR) injection 2.5-5 mg  2.5-5 mg Intravenous Q6H PRN Denver Shipman MD   2.5 mg at 02/20/25 1615    naloxone (NARCAN) injection 0.2 mg  0.2 mg Intravenous Q2 Min PRN Denver Shipman MD        Or    naloxone (NARCAN) injection 0.4 mg  0.4 mg Intravenous Q2 Min PRN Denver Shipman MD        Or    naloxone  (NARCAN) injection 0.2 mg  0.2 mg Intramuscular Q2 Min PRN Denver Shipman MD        Or    naloxone (NARCAN) injection 0.4 mg  0.4 mg Intramuscular Q2 Min PRN Denver Shipman MD        No lozenges or gum should be given while patient on BIPAP/AVAPS/AVAPS AE   Does not apply Continuous PRN Denver Shipman MD        ondansetron (ZOFRAN ODT) ODT tab 4 mg  4 mg Oral Q6H PRN Denver Shipman MD        Or    ondansetron (ZOFRAN) injection 4 mg  4 mg Intravenous Q6H PRN Denver Shipman MD   4 mg at 02/20/25 1803    Patient may continue current oral medications   Does not apply Continuous PRN Denver Shipman MD        petrolatum-zinc oxide (SENSI-CARE) 49-15 % ointment   Topical Daily PRN Hugo Hector PA-C        phenol (CHLORASEPTIC) 1.4 % spray 1 mL  1 spray Mouth/Throat Q1H PRN Johanne Crowell MD   1 mL at 02/11/25 1736    prochlorperazine (COMPAZINE) injection 5 mg  5 mg Intravenous Q6H PRN Eren Mandel MD   5 mg at 02/07/25 0813    senna-docusate (SENOKOT-S/PERICOLACE) 8.6-50 MG per tablet 1 tablet  1 tablet Oral BID PRN Denver Shipman MD   1 tablet at 01/27/25 0832    Or    senna-docusate (SENOKOT-S/PERICOLACE) 8.6-50 MG per tablet 2 tablet  2 tablet Oral BID PRN Denver Shipman MD        sodium chloride (PF) 0.9% PF flush 3 mL  3 mL Intracatheter q1 min prn Celine Plata APRN CNP        sodium chloride (PF) 0.9% PF flush 3 mL  3 mL Intracatheter q1 min prn Denver Shipman MD        sodium chloride 0.9% BOLUS 1-250 mL  1-250 mL Intravenous Q1H PRN Harry Barnes MD        sodium chloride 0.9% BOLUS 100-150 mL  100-150 mL Intravenous Q15 Min PRN Braden Oneill MD        Stop Heparin 60 minutes before end of treatment   Does not apply Continuous PRN Braden Oneill MD                Data:      All new lab and imaging data was reviewed.   Recent Labs   Lab Test 02/23/25  0452 02/22/25  0535 02/21/25  0530 02/15/25  0842 02/14/25  1421 06/03/24  1644 04/24/24  0913  "08/16/21  1816 04/16/21  0800   WBC 5.4 6.7 6.6   < > 4.4   < > 6.3   < > 6.4   HGB 7.3* 7.6* 7.4*   < > 8.4*   < > 10.1*   < > 10.9*    100 99   < > 98   < > 101*   < > 98    245 241   < > 184   < > 139*   < > 194   INR  --   --   --   --  1.57*  --  1.11  --  1.14    < > = values in this interval not displayed.      Recent Labs   Lab Test 02/24/25  0633 02/24/25  0215 02/23/25  2129 02/23/25  0653 02/23/25  0452 02/22/25  0601 02/22/25  0535 02/21/25  0546 02/21/25  0530   NA  --   --   --   --  134*  --  138  --  137   POTASSIUM  --   --   --   --  3.8  --  3.7  --  4.1   CHLORIDE  --   --   --   --  94*  --  97*  --  97*   CO2  --   --   --   --  29  --  30*  --  28   BUN  --   --   --   --  66.9*  --  43.6*  --  66.4*   CR  --   --   --   --  3.96*  --  2.83*  --  4.02*   ANIONGAP  --   --   --   --  11  --  11  --  12   JODY  --   --   --   --  10.1  --  9.6  --  10.3   * 155* 170*   < > 198*   < > 192*   < > 204*    < > = values in this interval not displayed.     No lab results found.    Invalid input(s): \"TROP\", \"TROPONINIES\"      "

## 2025-02-24 NOTE — PROGRESS NOTES
Potassium   Date Value Ref Range Status   02/23/2025 3.8 3.4 - 5.3 mmol/L Final   02/02/2022 4.7 3.4 - 5.3 mmol/L Final   04/17/2021 4.2 3.4 - 5.3 mmol/L Final     Hemoglobin   Date Value Ref Range Status   02/23/2025 7.3 (L) 11.7 - 15.7 g/dL Final   04/16/2021 10.9 (L) 11.7 - 15.7 g/dL Final     Creatinine   Date Value Ref Range Status   02/23/2025 3.96 (H) 0.51 - 0.95 mg/dL Final   04/17/2021 5.98 (H) 0.52 - 1.04 mg/dL Final       DIALYSIS PROCEDURE NOTE  Hepatitis status of previous patient on machine log was checked and verified ok to use with this patients hepatitis status.  Patient dialyzed for 3 hrs. on a K3 bath with a net fluid removal of  3 L.  A BFR of 450 ml/min was obtained via a left arm AVfistula using 15 gauge needles.      The treatment plan was discussed with Dr. Oneill during the treatment.    Total heparin received during the treatment: 1600 units.   Needle cannulation sites held x 10 min.     Meds  given: Epo.   Complications: None      Person educated: pt. Knowledge base Substantial. Barriers to learning: none. Educated on procedure via verbal mode. Patient verbalized understanding.   ICEBOAT? Timeout performed pre-treatment  I: Patient was identified using 2 identifiers  C:  Consent Signed Yes  E: Equipment preventative maintenance is current and dialysis delivery system OK to use  B: Hepatitis B Surface Antigen: Negative; Draw Date: 2/6/2025      Hepatitis B Surface Antibody: Susceptible; Draw Date: 1/9/2025  O: Dialysis orders present and complete prior to treatment  A: Vascular access verified and assessed prior to treatment  T: Treatment was performed at a clinically appropriate time  ?: Patient was allowed to ask questions and address concerns prior to treatment  See Adult Hemodialysis flowsheet in [employer] for further details and post assessment.  Machine water alarm in place and functioning. Transducer pods intact and checked every 15min.   Pt assisted with repositioning throughout dialysis  treatment.  Pt returned via bed.  Chlorine/Chloramine water system checked every 4 hours.  Outpatient Dialysis at [facility].      Post treatment report given to Gracie ESCUDERO RN regarding 3 L of fluid removed, last BP      Yen Nesbitt Dialysis RN

## 2025-02-24 NOTE — PLAN OF CARE
"     Patient Name: Geronimo  MRN: [id number]  Date of Admission: 1/8/2025  Reason for Admission: Flu A  Level of Care: Medsur     Vitals:         BP Readings from Last 1 Encounters:   02/22/25 (!) 145/60            Pulse Readings from Last 1 Encounters:   02/22/25 76            Wt Readings from Last 1 Encounters:   02/22/25 94.5 kg (208 lb 5.4 oz)            Ht Readings from Last 1 Encounters:   02/04/25 1.702 m (5' 7.01\")      Estimated body mass index is 32.62 kg/m  as calculated from the following:    Height as of this encounter: 1.702 m (5' 7.01\").    Weight as of this encounter: 94.5 kg (208 lb 5.4 oz).        Temp Readings from Last 1 Encounters:   02/22/25 98.3  F (36.8  C) (Oral)         Pain: Pain goal 0 Pain Rating 2 Effective pain medication/regimen tylenol     CV Surgery Patient: No     Assessment     Resp: Lung sounds diminished. On 2L O2, refused CPAP overnight. Dyspneic on exertion  Telemetry: SR/ST  Neuro: A+Ox4. Follows commands  GI/: anuric on HD. Bowels active, no BM  Skin/Wounds: Pale/reddened coccyx. Scattered bruising. L AKA. Peeling to R heel  Lines/Drains: PEG running TF, PIV SL  Activity: up w/ lift. Refuses repositioning at times  Sleep: fair  Abnormal Labs: Cr 3.96 hgb 7.3     Aggression Stop Light: Green          Patient Care Plan: continue plan of care. Encourage activity. TCU at discharge.            "

## 2025-02-25 ENCOUNTER — APPOINTMENT (OUTPATIENT)
Dept: PHYSICAL THERAPY | Facility: CLINIC | Age: 75
DRG: 207 | End: 2025-02-25
Attending: PHYSICIAN ASSISTANT
Payer: COMMERCIAL

## 2025-02-25 LAB
GLUCOSE BLDC GLUCOMTR-MCNC: 127 MG/DL (ref 70–99)
GLUCOSE BLDC GLUCOMTR-MCNC: 140 MG/DL (ref 70–99)
GLUCOSE BLDC GLUCOMTR-MCNC: 146 MG/DL (ref 70–99)
GLUCOSE BLDC GLUCOMTR-MCNC: 173 MG/DL (ref 70–99)
GLUCOSE BLDC GLUCOMTR-MCNC: 176 MG/DL (ref 70–99)
GLUCOSE BLDC GLUCOMTR-MCNC: 203 MG/DL (ref 70–99)

## 2025-02-25 PROCEDURE — 99232 SBSQ HOSP IP/OBS MODERATE 35: CPT | Performed by: HOSPITALIST

## 2025-02-25 PROCEDURE — 99232 SBSQ HOSP IP/OBS MODERATE 35: CPT | Performed by: INTERNAL MEDICINE

## 2025-02-25 PROCEDURE — 250N000013 HC RX MED GY IP 250 OP 250 PS 637: Performed by: HOSPITALIST

## 2025-02-25 PROCEDURE — 250N000013 HC RX MED GY IP 250 OP 250 PS 637: Performed by: INTERNAL MEDICINE

## 2025-02-25 PROCEDURE — 250N000009 HC RX 250: Performed by: HOSPITALIST

## 2025-02-25 PROCEDURE — 97530 THERAPEUTIC ACTIVITIES: CPT | Mod: GP

## 2025-02-25 PROCEDURE — 94640 AIRWAY INHALATION TREATMENT: CPT | Mod: 76

## 2025-02-25 PROCEDURE — 120N000001 HC R&B MED SURG/OB

## 2025-02-25 PROCEDURE — 97110 THERAPEUTIC EXERCISES: CPT | Mod: GP

## 2025-02-25 PROCEDURE — 999N000157 HC STATISTIC RCP TIME EA 10 MIN

## 2025-02-25 PROCEDURE — 250N000011 HC RX IP 250 OP 636: Performed by: HOSPITALIST

## 2025-02-25 PROCEDURE — G0463 HOSPITAL OUTPT CLINIC VISIT: HCPCS

## 2025-02-25 PROCEDURE — 94640 AIRWAY INHALATION TREATMENT: CPT

## 2025-02-25 RX ADMIN — HYDRALAZINE HYDROCHLORIDE 75 MG: 50 TABLET ORAL at 21:52

## 2025-02-25 RX ADMIN — APIXABAN 5 MG: 5 TABLET, FILM COATED ORAL at 10:08

## 2025-02-25 RX ADMIN — MICONAZOLE NITRATE: 2 POWDER TOPICAL at 10:09

## 2025-02-25 RX ADMIN — ATORVASTATIN CALCIUM 20 MG: 20 TABLET, FILM COATED ORAL at 21:52

## 2025-02-25 RX ADMIN — METOPROLOL TARTRATE 2.5 MG: 5 INJECTION INTRAVENOUS at 18:11

## 2025-02-25 RX ADMIN — SERTRALINE HYDROCHLORIDE 75 MG: 50 TABLET ORAL at 22:08

## 2025-02-25 RX ADMIN — LEVALBUTEROL HYDROCHLORIDE 1.25 MG: 1.25 SOLUTION RESPIRATORY (INHALATION) at 19:09

## 2025-02-25 RX ADMIN — APIXABAN 5 MG: 5 TABLET, FILM COATED ORAL at 21:52

## 2025-02-25 RX ADMIN — Medication 40 MG: at 10:06

## 2025-02-25 RX ADMIN — Medication 5 ML: at 10:07

## 2025-02-25 RX ADMIN — ACETAMINOPHEN 650 MG: 325 TABLET, FILM COATED ORAL at 11:16

## 2025-02-25 RX ADMIN — Medication 50 MCG: at 10:08

## 2025-02-25 RX ADMIN — AMIODARONE HYDROCHLORIDE 200 MG: 200 TABLET ORAL at 10:08

## 2025-02-25 RX ADMIN — AMIODARONE HYDROCHLORIDE 200 MG: 200 TABLET ORAL at 21:52

## 2025-02-25 RX ADMIN — MICONAZOLE NITRATE: 2 POWDER TOPICAL at 21:58

## 2025-02-25 RX ADMIN — SENNOSIDES 5 ML: 8.8 LIQUID ORAL at 10:07

## 2025-02-25 RX ADMIN — HYDRALAZINE HYDROCHLORIDE 75 MG: 50 TABLET ORAL at 06:08

## 2025-02-25 RX ADMIN — HYDRALAZINE HYDROCHLORIDE 75 MG: 50 TABLET ORAL at 14:49

## 2025-02-25 RX ADMIN — FUROSEMIDE 80 MG: 40 TABLET ORAL at 10:07

## 2025-02-25 RX ADMIN — INSULIN GLARGINE 15 UNITS: 100 INJECTION, SOLUTION SUBCUTANEOUS at 10:09

## 2025-02-25 RX ADMIN — TACROLIMUS: 1 OINTMENT TOPICAL at 21:58

## 2025-02-25 RX ADMIN — Medication 60 ML: at 16:30

## 2025-02-25 RX ADMIN — IPRATROPIUM BROMIDE 0.5 MG: 0.5 SOLUTION RESPIRATORY (INHALATION) at 19:09

## 2025-02-25 RX ADMIN — LEVALBUTEROL HYDROCHLORIDE 1.25 MG: 1.25 SOLUTION RESPIRATORY (INHALATION) at 14:51

## 2025-02-25 RX ADMIN — ONDANSETRON 4 MG: 4 TABLET, ORALLY DISINTEGRATING ORAL at 12:02

## 2025-02-25 RX ADMIN — CETIRIZINE HYDROCHLORIDE 5 MG: 5 TABLET ORAL at 21:52

## 2025-02-25 RX ADMIN — AMLODIPINE BESYLATE 10 MG: 10 TABLET ORAL at 10:08

## 2025-02-25 RX ADMIN — TACROLIMUS: 1 OINTMENT TOPICAL at 10:06

## 2025-02-25 RX ADMIN — IPRATROPIUM BROMIDE 0.5 MG: 0.5 SOLUTION RESPIRATORY (INHALATION) at 14:51

## 2025-02-25 RX ADMIN — QUETIAPINE FUMARATE 50 MG: 50 TABLET ORAL at 21:51

## 2025-02-25 ASSESSMENT — ACTIVITIES OF DAILY LIVING (ADL)
ADLS_ACUITY_SCORE: 84

## 2025-02-25 NOTE — PROGRESS NOTES
[facility]  Hospitalist Progress Note        Chandu Rodriguez MD   02/25/2025        Interval History:        - No acute issues overnight; denies any active complaints  - last session of dialysis 2/24 and thought that dialysis session was too long, next planned for 2/26  - SW following for disposition to TCU, declined by several TCU's         Assessment and Plan:        Supriya Herr is a 74 year old female with PMHx significant for ESRD on HD, CHF, COPD, poor mobility (Lt AKA, Obesity, WC bound), DM type II, hypertension.  She was brought to the ER by EMS for evaluation of shortness of breath, diagnosed with influenza A and admitted on 1/8/25       Hospital course summary  She was intubated for worsening respiratory failure and remained on mechanical ventilator 1/9 - 1/18; reintubated (1/20 - 1/25).  Was treated with Tamiflu, IV antibiotic, steroid, has completed courses.  Hospital course complicated by A-fib RVR.  Was on amiodarone drip with eventual transition to p.o. Also required diltiazem  for rate control.  Required BiPAP--HFNC--intermittent BiPAP use.  Ongoing tube feeding and modified diet per SLP.  Delirium managed with Seroquel.  Concern for recurrent pneumonia, hospital-acquired on 2/1, restarted on antibiotics.  ID consulted, recommended discontinuing antibiotics, respiratory failure likely related to fluid overload.     Patient developed symptomatic bradycardia on 2/5/25 triggering a RRT. She was placed on dopamine drip and transferred back to the ICU. Bradycardia was likely due to rate controlling medications. Cardiology consulted with adjustment of medications. Patient has remained in NSR without recurrence of bradycardia on amio drip. While in the ICU, patient required dialysis on pressors, now transitioned to midodrine . Hypoxic and hypercarbic resp failure is much improved with dialysis.      Patient transferred back to hospitalist service on 2/8/25. Recurrent issues with  atrial fib with RVR. Disposition now pending TCU placement.     Acute hypoxic respiratory failure requiring mechanical ventilation  Influenza A pneumonia  Acute COPD exacerbation  Hospital-acquired pneumonia  Pharyngeal edema  - Initial presentation with shortness of breath, diagnosed influenza A; prolonged ICU course requiring mechanical ventilation as noted above.  *1/20 bronchoscopy and BAL: culture grew actinomyces  *CT chest showed RML infiltrate versus atelectasis but no sign of actinomycosis, intensivist felt this was likely colonization.    *Pneumonia treated with cefepime and vancomycin, then Levaquin. Completed course 1/26.  *Flu and COPD exacerbation was treated with IV steroids and tamiflu.  *2/1 had increased O2 needs, concern for new infection. repeat blood cx --negative. CT chest showed possible right lower lobe pneumonia and ID was consulted and patient was again started on vancomycin and Levaquin, then subsequently discontinued as respiratory failure was determined to be due to fluid overload as she improved with dialysis.  - Continue budesonide 1 Mg twice daily, ipratropium/xopenex 4 times daily  -respiratory status stable ; occasionally needing 1 to 2 L; oxygen weaned down to room air (as of 2/25 )     Recurrent atrial fibrillation with RVR  Acute on chronic CHFpEF  Hypertension  Shock, due to sedation-resolved  Symptomatic bradycardia-Resolved  - PTA amlodipine 5 Mg BID, Coreg 25 Mg BID and Lasix.  *1/21/25 TTE: LVEF 60%, no pericardial effusion, no significant valvular disease, diastolic function indeterminate.  No WMA.  *2/2/25 had symptomatic bradycardia due to rate controlling medications requiring RRT. Placed on dopamine gtt, coreg and diltiazem were held. Her heart went back into normal sinus and only amiodarone 200mg daily was continued at that point.  *2/10/25: developed atrial fib with RVR, amiodarone gtt restarted and PO dose increased from daily to BID.  *2/12/25: restarted amlodipine  and oral hydralazine for uptrending BPs  *2/15/25: recurrent atrial fib with RVR, RRT called, amiodarone gtt restarted.  - Cardiology has followed on/off due to recurrent atrial fib and bradycardia.   - Intermittent needs for amiodarone gtt, currently on amiodarone oral 200mg BID.  - initial plan for PPM on 2/18; now discontinued per EP (signed off 2/19). No indication for PPM at this time.   - A-fib to be managed with digoxin-- switched to three times a week (2/22) per pharmacy recs  - Continue with atorvastatin 20 mg daily and Lasix 80 mg daily (hold on dialysis days)  - continue Eliquis 5mg BID  - continue amlodipine 10 mg BID, hydralazine 75mg q8h, amiodarone 200 mg BID  - gets midodrine prior to dialysis  - rate remains controlled for several days ; will discontinue telemetry (2/25)     Encephalopathy/delirium likely infectious and metabolic-improved  Intermittent twitchings  Anxiety  *1/20/25 CT head: negative for acute intracranial process; Suspected some baseline cognitive impairment.    *2/10/25: CT head: no acute intracranial process.   *2/10/25 EEG: showed moderate generalized slowing and triphasic waves which per neurology commonly seen in chronic kidney disease.   - Of note has had on and off jerking movements/twitchings during her hospital stay  - Neurology evaluated (2/11) and recommended IV thiamine supplements X 5 days  - stable since 2/11/25: AOx2-3, fluctuating mental status but has mostly been stable now  - no clinical concern for generalized seizure ; will monitor clinically for now and if worsening can consider neurology reevaluation with video EEG monitoring  - Continue Seroquel 50mg qhs  - Continue PTA Zoloft and gabapentin.  - PT, OT recommend TCU     Diabetes mellitus, type 2  HbA1c 6.5% on 1/17/25. PTA Lantus 18u qd and NovoLog 5u TIDWM  - Lantus 15 units BID resumed on 2/21. Initally held for procedure   -medium resistance sliding scale insulin q4h  -Hypoglycemia protocol     ESRD on HD ,  wasTue-Thu-Sat, now MWF  Anemia of chronic disease  Hyperkalemia-resolved  -Nephrology following, currently MWF dialysis  -Hemoglobin 7-8, at baseline.  -Gets EPO with hemodialysis.  -Continue vitamin D3.    -Nephrology resumed Lasix on nondialysis days on 2/3.    -continue midodrine daily MWF with dialysis  -Lokelma discontinued on 2/10      Dysphagia  Suspect due to recurrent intubation. Did well with video swallow study on 1/31/25, diet advanced.  But again was made n.p.o. on 2/11  *2/16 ENT evaluated with flexible scope: mild edema of arytenoids. No candida infection of pharynx of larynx. No findings to explain dysphagia  *2/17 G tube placed with IR and initiated on tube feeds, nutrition following  - SLP following to assess for PO intake; will have SLP follow up outpatient and will also plan for outpatient GI referral    Likely cerumen impaction- resolved  2/11 reported ear pressure and some ringing  - improved with debrox drops added 2/12, finished course 2/16. Ears clear when evaluated by ENT on 2/16      DVT Prophylaxis: on Eliquis    Code Status: Full Code       Diet:   Adult Formula Drip Feeding: Continuous Zen99 Renal Support; Gastrostomy; Goal Rate: 65; mL/hr; From: 4:00 PM; To: 6:00 AM; run x14 hours nightly for total volume 910 mL (orders updated 2/18 to reflect new enteral access)  Combination Diet Moderate Consistent Carb (60 g CHO per Meal) Diet; Easy to Chew (level 7); Thin Liquids (level 0) (Sit upright, stay up for 60+ minutes, alternate bites and sips, slow rate)      Disposition:   Medically Ready for Discharge: Ready Now pending TCU placement;  following for disposition    - planned for likely TCU discharge on 2/26/25 with a stretcher transport    Care plan discussed with patient and nursing     Clinically Significant Risk Factors               # Hypoalbuminemia: Lowest albumin = 2.8 g/dL at 1/13/2025  5:14 AM, will monitor as appropriate       # Hypertension: Noted on problem  "list    # Chronic heart failure with preserved ejection fraction: heart failure noted on problem list and last echo with EF >50%      # Acute Hypercapnic Respiratory Failure: based on venous blood gas results.  Continue supplemental oxygen and ventilatory support as indicated.          # DMII: A1C = 6.5 % (Ref range: <5.7 %) within past 6 months   # Obesity: Estimated body mass index is 32.97 kg/m  as calculated from the following:    Height as of this encounter: 1.702 m (5' 7.01\").    Weight as of this encounter: 95.5 kg (210 lb 8.6 oz).        # Financial/Environmental Concerns: none                            Physical Exam:      Blood pressure 129/48, pulse 76, temperature 98.2  F (36.8  C), temperature source Oral, resp. rate 18, height 1.702 m (5' 7.01\"), weight 95.5 kg (210 lb 8.6 oz), SpO2 96%, not currently breastfeeding.  Vitals:    02/22/25 0544 02/23/25 0529 02/24/25 0500   Weight: 94.5 kg (208 lb 5.4 oz) 95.6 kg (210 lb 12.2 oz) 95.5 kg (210 lb 8.6 oz)     Vital Signs with Ranges  Temp:  [97.9  F (36.6  C)-98.3  F (36.8  C)] 98.2  F (36.8  C)  Pulse:  [73-86] 76  Resp:  [18-20] 18  BP: (110-156)/(37-75) 129/48  SpO2:  [96 %-100 %] 96 %  I/O's Last 24 hours  I/O last 3 completed shifts:  In: 760 [P.O.:240; NG/GT:520]  Out: 3000 [Other:3000]    Constitutional: Alert, awake and oriented; resting comfortably in no apparent distress       Oral cavity: Moist mucosa   Cardiovascular: Normal s1 s2, regular rate and rhythm, no murmur   Lungs: B/l clear to auscultation, no wheezes or crepitations   Abdomen: Soft, nt, nd, no guarding, rigidity or rebound; BS +; G tube in place   LE : No edema on right; left AKA status   Musculoskeletal/Neuro Power 5/5 in all extremities; No focal neurological deficits noted   Psychiatry: normal mood and affect                Medications:        Current Facility-Administered Medications   Medication Dose Route Frequency Provider Last Rate Last Admin    - MEDICATION INSTRUCTIONS for " Dialysis Patients -   Does not apply See Admin Instructions Akosua Garcia, [facility]        amiodarone (PACERONE) tablet 200 mg  200 mg Oral BID Carly Faye DO   200 mg at 02/24/25 2158    amLODIPine (NORVASC) tablet 10 mg  10 mg Oral BID Braden Oneill MD   10 mg at 02/24/25 2158    apixaban ANTICOAGULANT (ELIQUIS) tablet 5 mg  5 mg Oral or Feeding Tube BID Don Trammell MD   5 mg at 02/24/25 2158    atorvastatin (LIPITOR) tablet 20 mg  20 mg Oral or Feeding Tube QPM Denver Shipman MD   20 mg at 02/24/25 2158    B and C vitamin Complex with folic acid (NEPHRONEX) liquid 5 mL  5 mL Per Feeding Tube Daily Denver Shipman MD   5 mL at 02/24/25 1049    budesonide (PULMICORT) neb solution 1 mg  1 mg Nebulization BID Denver Shipman MD   1 mg at 02/24/25 0718    calcitRIOL (ROCALTROL) solution 0.5 mcg  0.5 mcg Oral or G tube Once per day on Monday Wednesday Friday Enu-Vivian Samaniego MD   0.5 mcg at 02/24/25 1527    cetirizine (zyrTEC) tablet 5 mg  5 mg Oral or Feeding Tube At Bedtime Denver Shipman MD   5 mg at 02/24/25 2158    cinacalcet (SENSIPAR) tablet 30 mg  30 mg Oral Once per day on Monday Wednesday Friday Chandu Rodriguez MD   30 mg at 02/24/25 1039    digoxin (LANOXIN) half-tab 62.5 mcg  62.5 mcg Oral Once per day on Monday Wednesday Friday hCandu Rodriguez MD   62.5 mcg at 02/24/25 1527    furosemide (LASIX) tablet 80 mg  80 mg Oral or Feeding Tube Daily Vivian Russell MD   80 mg at 02/23/25 0842    hydrALAZINE (APRESOLINE) tablet 75 mg  75 mg Oral Q8H VÍCTOR Carly Faye DO   75 mg at 02/25/25 0608    insulin aspart (NovoLOG) injection (RAPID ACTING)  1-7 Units Subcutaneous Q4H Carly Faye DO   2 Units at 02/25/25 0622    insulin glargine (LANTUS PEN) injection 15 Units  15 Units Subcutaneous BID Enu-Vivian Samaniego MD   15 Units at 02/24/25 7677    ipratropium (ATROVENT) 0.02 % neb solution 0.5 mg  0.5 mg Nebulization 4x daily  Johanne Crowell MD   0.5 mg at 02/24/25 1944    levalbuterol (XOPENEX) neb solution 1.25 mg  1.25 mg Nebulization 4x daily Johanne Crowell MD   1.25 mg at 02/24/25 1944    miconazole (MICATIN) 2 % powder   Topical BID Denver Shipman MD   Given at 02/24/25 2138    midodrine (PROAMATINE) tablet 5 mg  5 mg Oral Once per day on Monday Wednesday Friday Vivian Russell MD   5 mg at 02/24/25 1039    pantoprazole (PROTONIX) 2 mg/mL suspension 40 mg  40 mg Per Feeding Tube QAM Denver Mays MD   40 mg at 02/18/25 0942    Or    pantoprazole (PROTONIX) IV push injection 40 mg  40 mg Intravenous QA Denver Mays MD   40 mg at 02/24/25 0635    polyethylene glycol (MIRALAX) Packet 17 g  17 g Oral or Feeding Tube Daily Liset Romero MD   17 g at 02/23/25 0842    Prosource TF20 ENfit Compatibl EN LIQD (PROSOURCE TF20) packet 60 mL  1 packet Per Feeding Tube Daily Truman Moreland MD   60 mL at 02/24/25 1526    QUEtiapine (SEROquel) tablet 50 mg  50 mg Oral or Feeding Tube At Bedtime Cheo Watt MD   50 mg at 02/24/25 2158    sennosides (SENOKOT) syrup 10 mL  10 mL Oral or Feeding Tube BID Liset Romero MD   10 mL at 02/23/25 0842    sertraline (ZOLOFT) tablet 75 mg  75 mg Oral or Feeding Tube QPM Denver Shipman MD   75 mg at 02/24/25 2158    sodium chloride (PF) 0.9% PF flush 3 mL  3 mL Intracatheter Q8H Celine Plata APRN CNP   3 mL at 02/24/25 2159    tacrolimus (PROTOPIC) 0.1 % ointment   Topical BID Denver Shipman MD   Given at 02/24/25 2138    vitamin D3 (CHOLECALCIFEROL) tablet 50 mcg  50 mcg Oral or Feeding Tube Daily Denver Shipman MD   50 mcg at 02/24/25 1039     PRN Meds:   Current Facility-Administered Medications   Medication Dose Route Frequency Provider Last Rate Last Admin    acetaminophen (TYLENOL) tablet 650 mg  650 mg Oral Q4H PRN Denver Shipman MD   650 mg at 02/24/25 1527    Or    acetaminophen (TYLENOL) Suppository 650 mg  650 mg Rectal Q4H PRN Umberto  Denver DAIGLE MD        albumin human 25 % injection 50 mL  50 mL Intravenous Q1H PRN Braden Oneill MD        albumin human 25 % injection 50 mL  50 mL Intravenous Q1H PRN Braden Oneill MD        albuterol (PROVENTIL HFA/VENTOLIN HFA) inhaler  2 puff Inhalation Q6H PRN Denver Shipman MD        benzocaine-menthol (CHLORASEPTIC) 6-10 MG lozenge 1 lozenge  1 lozenge Buccal Q1H PRN Carly Faye DO        bisacodyl (DULCOLAX) suppository 10 mg  10 mg Rectal Daily PRN Liset Romero MD        calcium carbonate (TUMS) chewable tablet 1,000 mg  1,000 mg Oral 4x Daily PRN Denver Shipman MD   1,000 mg at 02/05/25 1526    carboxymethylcellulose PF (REFRESH PLUS) 0.5 % ophthalmic solution 1 drop  1 drop Both Eyes Q1H PRN Denver Shipman MD        dextrose 10% infusion   Intravenous Continuous PRN Denver Shipman MD        glucose gel 15-30 g  15-30 g Oral Q15 Min PRN Denver Shipman MD        Or    dextrose 50 % injection 25-50 mL  25-50 mL Intravenous Q15 Min PRN Denver Shipman MD        Or    glucagon injection 1 mg  1 mg Subcutaneous Q15 Min PRN Denver Shipman MD        guaiFENesin-dextromethorphan (ROBITUSSIN DM) 100-10 MG/5ML syrup 10 mL  10 mL Oral Q4H PRN Carly Faye DO   10 mL at 02/20/25 1748    hydrALAZINE (APRESOLINE) injection 10 mg  10 mg Intravenous Q6H PRN Carly Faye DO        HYDROmorphone (DILAUDID) injection 0.2 mg  0.2 mg Intravenous Q2H PRN Carly Faye DO   0.2 mg at 02/19/25 1532    ipratropium - albuterol 0.5 mg/2.5 mg/3 mL (DUONEB) neb solution 3 mL  3 mL Nebulization Q4H PRN Ted Proctor APRN CNP        lidocaine (LMX4) cream   Topical Q1H PRN Celine Plata APRN CNP        lidocaine (LMX4) cream   Topical Q1H PRN Denver Shipman MD        lidocaine 1 % 0.1-1 mL  0.1-1 mL Other Q1H PRN Celine Plata APRN CNP        lidocaine 1 % 0.1-1 mL  0.1-1 mL Other Q1H Denver Blackburn MD   1 mL at  02/14/25 1226    lidocaine 1 % 0.5 mL  0.5 mL Intradermal Once PRN Braden Oneill MD        lidocaine 1 % 0.5 mL  0.5 mL Intradermal Once PRN Braden Oneill MD        lidocaine 1 % 0.5 mL  0.5 mL Intradermal Once PRN Braden Oneill MD        lidocaine 1 % 0.5 mL  0.5 mL Intradermal Once PRN Braden Oneill MD        melatonin tablet 1 mg  1 mg Oral At Bedtime PRN Denver Shipman MD   1 mg at 02/11/25 2212    metoprolol (LOPRESSOR) injection 2.5-5 mg  2.5-5 mg Intravenous Q6H PRN Denver Shipman MD   2.5 mg at 02/20/25 1615    naloxone (NARCAN) injection 0.2 mg  0.2 mg Intravenous Q2 Min PRN Denver Shipman MD        Or    naloxone (NARCAN) injection 0.4 mg  0.4 mg Intravenous Q2 Min PRN Denver Shipman MD        Or    naloxone (NARCAN) injection 0.2 mg  0.2 mg Intramuscular Q2 Min PRN Denver Shipman MD        Or    naloxone (NARCAN) injection 0.4 mg  0.4 mg Intramuscular Q2 Min PRN Denver Shipman MD        No lozenges or gum should be given while patient on BIPAP/AVAPS/AVAPS AE   Does not apply Continuous PRN Denver Shipman MD        ondansetron (ZOFRAN ODT) ODT tab 4 mg  4 mg Oral Q6H PRN Denver Shipman MD        Or    ondansetron (ZOFRAN) injection 4 mg  4 mg Intravenous Q6H PRN Denver Shipman MD   4 mg at 02/20/25 1803    Patient may continue current oral medications   Does not apply Continuous PRN Denver Shipman MD        petrolatum-zinc oxide (SENSI-CARE) 49-15 % ointment   Topical Daily PRN Hugo Hector PA-C        phenol (CHLORASEPTIC) 1.4 % spray 1 mL  1 spray Mouth/Throat Q1H PRN Johanne Crowell MD   1 mL at 02/11/25 1736    prochlorperazine (COMPAZINE) injection 5 mg  5 mg Intravenous Q6H PRN Eren Mandel MD   5 mg at 02/07/25 0813    senna-docusate (SENOKOT-S/PERICOLACE) 8.6-50 MG per tablet 1 tablet  1 tablet Oral BID PRN Denver Shipman MD   1 tablet at 01/27/25 0832    Or    senna-docusate (SENOKOT-S/PERICOLACE) 8.6-50 MG per  tablet 2 tablet  2 tablet Oral BID PRN Denver Shipman MD        sodium chloride (PF) 0.9% PF flush 3 mL  3 mL Intracatheter q1 min prn Celine Plata APRN CNP        sodium chloride (PF) 0.9% PF flush 3 mL  3 mL Intracatheter q1 min prn Denver Shipman MD        sodium chloride 0.9% BOLUS 1-250 mL  1-250 mL Intravenous Q1H PRN Harry Barnes MD        sodium chloride 0.9% BOLUS 100-150 mL  100-150 mL Intravenous Q15 Min PRN Braden Oneill MD        sodium chloride 0.9% BOLUS 100-150 mL  100-150 mL Intravenous Q15 Min PRN Braden Oneill MD        Stop Heparin 60 minutes before end of treatment   Does not apply Continuous PRN Braden Oneill MD        Stop Heparin 60 minutes before end of treatment   Does not apply Continuous Braden Rausch MD                Data:      All new lab and imaging data was reviewed.   Recent Labs   Lab Test 02/23/25  0452 02/22/25  0535 02/21/25  0530 02/15/25  0842 02/14/25  1421 06/03/24  1644 04/24/24  0913 08/16/21  1816 04/16/21  0800   WBC 5.4 6.7 6.6   < > 4.4   < > 6.3   < > 6.4   HGB 7.3* 7.6* 7.4*   < > 8.4*   < > 10.1*   < > 10.9*    100 99   < > 98   < > 101*   < > 98    245 241   < > 184   < > 139*   < > 194   INR  --   --   --   --  1.57*  --  1.11  --  1.14    < > = values in this interval not displayed.      Recent Labs   Lab Test 02/25/25  0621 02/25/25  0124 02/24/25  2117 02/23/25  0653 02/23/25  0452 02/22/25  0601 02/22/25  0535 02/21/25  0546 02/21/25  0530   NA  --   --   --   --  134*  --  138  --  137   POTASSIUM  --   --   --   --  3.8  --  3.7  --  4.1   CHLORIDE  --   --   --   --  94*  --  97*  --  97*   CO2  --   --   --   --  29  --  30*  --  28   BUN  --   --   --   --  66.9*  --  43.6*  --  66.4*   CR  --   --   --   --  3.96*  --  2.83*  --  4.02*   ANIONGAP  --   --   --   --  11  --  11  --  12   JODY  --   --   --   --  10.1  --  9.6  --  10.3   * 176* 200*   < > 198*   < > 192*   < >  "204*    < > = values in this interval not displayed.     No lab results found.    Invalid input(s): \"TROP\", \"TROPONINIES\"      "

## 2025-02-25 NOTE — PLAN OF CARE
"Patient Name: Geronimo  MRN: [id number]  Date of Admission: 1/8/2025  Reason for Admission: Flu A w/ respiratory failure   Level of Care: Medical     Vitals:   BP Readings from Last 1 Encounters:   02/25/25 132/58     Pulse Readings from Last 1 Encounters:   02/25/25 82     Wt Readings from Last 1 Encounters:   02/24/25 95.5 kg (210 lb 8.6 oz)     Ht Readings from Last 1 Encounters:   02/04/25 1.702 m (5' 7.01\")     Estimated body mass index is 32.97 kg/m  as calculated from the following:    Height as of this encounter: 1.702 m (5' 7.01\").    Weight as of this encounter: 95.5 kg (210 lb 8.6 oz).  Temp Readings from Last 1 Encounters:   02/25/25 98  F (36.7  C) (Oral)       Pain: Pain goal 0 Pain Rating 2/6 Effective pain medication/regimen PRN tylenol     CV Surgery Patient: No    Assessment  Zofran given x1 for nausea.     Resp: LS diminished on room air.   Telemetry: Discontinued   Neuro: A&Ox4, forgetful. Can be resistant to cares.   GI/: Oliguria. Smear today. Nocturnal TF. Mod carb, easy to chew diet. Poor PO intake. BG stable.   Skin/Wounds: PI to coccyx/sacrum, edema, abhijit. L AKA.   Lines/Drains: L AV fistula. R PIV SL. G tube.   Activity: Lift, T/R Q2H   Sleep: Between cares   Abnormal Labs: None    Aggression Stop Light: Green          Patient Care Plan: Dialysis tomorrow. Discharge to [facility] TCU between 0825-0449 per SW via stretcher transport.                           "

## 2025-02-25 NOTE — PROGRESS NOTES
Care Management Follow Up    Length of Stay (days): 48    Expected Discharge Date: 02/26/2025     Concerns to be Addressed: other (see comments) (elevated risk)     Patient plan of care discussed at interdisciplinary rounds: Yes    Anticipated Discharge Disposition: Skilled Nursing Facility  Anticipated Discharge Services: None  Anticipated Discharge DME: None    Patient/family educated on Medicare website which has current facility and service quality ratings: no  Education Provided on the Discharge Plan: No  Patient/Family in Agreement with the Plan: yes    Referrals Placed by CM/SW:    Private pay costs discussed: Not applicable    Discussed  Partnership in Safe Discharge Planning  document with patient/family: No     Handoff Completed: No, handoff not indicated or clinically appropriate    Additional Information:  HIGINIO Followed up with pt and asked if her daughter was able to look at the list of TCU's and left some other choices?  Supriya said yes but she does not have them and her daughter will be back at 5pm tonight and we can follow up.     HIGINIO received message that the [facility] may be able to take pt and are reviewing the referral.    HIGINIO communicated back and forth regarding pt needs in regards to 02- pt on Room air now as of AM today, Pt is no longer on Bipap machine this was intermittently used but no longer. Pt does use a CPAP and indicated they can bring one from home.     HIGINIO met with pt at bedside to discuss the TCU that accepted and had an opening. HIGINIO indicated the location is in [place] and shared the address pt replied oh that's ok its closer than the other once anyway's.  HIGINIO asked pt if there daughter was at work, pt indicated yes she is, HIGINIO asked if she thought it would be ok to send her a message regarding the opening of the TCU location, and pt indicated yes that would be fine.    HIGINIO sent pt's daughter Caroline Gray a text message at: [phone] that introduced self and role,  SW indicated location pf TCU with opening and asked if this would work so we can arrange for transport.     SW communicated with [employer] regarding nutrition formulas, pt is ok to use carton mixture and will need to do an open feed, nutrition will send 3 days supply with pt at discharge as [employer] supply company indicated that they can order on Wednesday but takes 2-5 days to get there and they are not open on the weekend, so sending a supply should get them through to next week shipment arrives Monday.     SW and Care coordinator asked Dialysis team to put pt on morning dialysis so we can discharge in the afternoon. Dialysis team confirmed they can do this.     Addendum 1534:   Social work called [facility] [employer] [facility] [phone] and spoke with [facility]  [facility] set up for Wednesday 2/26/2025 at [phone].     Addendum 1543: SW completed PCS Form.     Addendum 1552: HIGINIO completed PAS : [id number]  PAS-RR    D: Per DHS regulation, HIGINIO completed and submitted PAS-RR to [place] Board on Aging Direct Connect via the Senior LinkAge Line.  PAS-RR confirmation # is : [id number]    P: Further questions may be directed to [facility] LinkAge Line at #[phone], option #4 for PAS-RR staff.    Addendum: 1604:  SW received notice from [facility] that tomorrow afternoon will work as long as they get the insurance authorization through in time.  HIGINIO provided the [employer] [facility] [employer] cell and desk phone number and requested they call or message the [employer] [facility] [employer] Social work phone ( cell) [phone]  or call the desk phone [phone] once the authorization comes through.  We have tentative stretcher transport set up for [phone] tomorrow.       Next Steps: Confirm insurance auth.  Discharge orders. Arrange for transport to dialysis with [facility] ([phone]) [facility] [phone]    GENET BolivarW

## 2025-02-25 NOTE — PLAN OF CARE
"Patient Name: Geronimo  MRN: [id number]  Date of Admission: 1/8/2025  Reason for Admission: SOB, influenza, RF   Level of Care: medical    [id number]    Vitals:   BP Readings from Last 1 Encounters:   02/25/25 (!) 143/53     Pulse Readings from Last 1 Encounters:   02/25/25 79     Wt Readings from Last 1 Encounters:   02/24/25 95.5 kg (210 lb 8.6 oz)     Ht Readings from Last 1 Encounters:   02/04/25 1.702 m (5' 7.01\")     Estimated body mass index is 32.97 kg/m  as calculated from the following:    Height as of this encounter: 1.702 m (5' 7.01\").    Weight as of this encounter: 95.5 kg (210 lb 8.6 oz).  Temp Readings from Last 1 Encounters:   02/24/25 98.2  F (36.8  C) (Oral)       Pain: denied pain    Assessment    Resp: LS clear/dim on 1L NC. DEVRIES. Infrequent, dry, non-productive cough.   Telemetry: SR  Neuro: AOx4, forgetful at times   GI/: TF running until 0600 @ 65ml/hr thru G tube, q4 30ml FWF. Meds thru G tube. Easy to chew/mod carb diet, thin liquids. +BS, +flatus, x2 small BM this shift. Voided x1, dialyzed MWF  Skin/Wounds: Several red open areas on coccyx skin prep and mepi applied. Rash under breasts and groin, ointment and miconazole applied. AV fistula L arm. L AKA.   Lines/Drains: G tube WDL, dressing changed x1, CDI. R PIV SL.   Activity: A2 turn/repo q2h. Refusing repositioning at times, education provided.   Sleep: 7-8 hours between cares  Abnormal Labs: no labs ordered. BG 170s-200s overnight    Aggression Stop Light: Green          Patient Care Plan: Encourage activity/OOB and repositioning. Needs WOC consult for coccyx PI's.TCU placement pending.   "

## 2025-02-25 NOTE — CONSULTS
"[facility]  WO Nurse Inpatient Assessment     Consulted for:   2/25 Pressure Injury Wound Coccyx   1/19 Pressure Injury R foot: has thin skin and scarring/callus due to previous ulcers. h/o L BKA so, this right foot is her only foot. thank you!!   1/8 Pressure Injury coccyx     Summary: patient with multiple woc consults this admission. New consult for buttock 2/25, with deterioration / new skin breakdown to sacrococcygeal, hospital acquired deep tissue pressure injury, initial assessment 2/25    WO nurse follow-up plan: twice weekly    Patient History (according to provider note(s):      \"74 year old female with PMHx significant for ESRD on HD, CHF, COPD, poor mobility (Lt AKA, Obesity, WC bound), DM type II, hypertension. She was brought to the ER by EMS for evaluation of shortness of breath, diagnosed with influenza A and admitted on 1/8/25.\"    Assessment:      Areas visualized during today's visit: Focused: and Sacrum/coccyx    Pressure Injury Location: buttock, sacrococcygeal and left buttock     Last photo: 2/25  Wound type: Pressure Injury     Pressure Injury Stage: Deep Tissue Pressure Injury (DTPI), hospital acquired , with friction component     Wound history/plan of care:   found with sacral mepilex in use     Wound base:  Non-blanchable, Purple, Epidermis, and Dermis,      Palpation of the wound bed:  mild firm mild textured        Drainage: scant     Description of drainage: serosanguinous     Measurements (length x width x depth, in cm) cluster of 4cm x 4cm x 0.1, nonblanchable purple  0.5  x 0.5  x  0 cm      Tunneling N/A     Undermining N/A  Periwound skin: Intact      Color: pink      Temperature: normal   Odor: none  Pain: mild, intermittent, with removal of mepilex   Pain intervention prior to dressing change: slow and gentle cares   Treatment goal: Heal  and Decrease moisture  STATUS: initial assessment  Supplies ordered: gathered, at bedside, supplies stored on " "unit, and discussed with patient     My PI Risk Assessment     Sensory Perception: 4 - No impairment     Moisture: 3 - Occasionally moist      Activity: 2 - Chairfast     Mobility: 2 - Very limited     Nutrition: 3 - Adequate     Friction/Shear: 1 - Problem     TOTAL: 15       Treatment Plan:     01/20/25 0837  Skin care precautions  EFFECTIVE NOW        Comments: Pressure Injury Prevention (PIP) Plan:  If patient is declining pressure injury prevention interventions: Explore reason why and address patient's concerns, Educate on pressure injury risk and prevention intervention(s), If patient is still declining, document \"informed refusal\" , and Ensure Care team is aware ( provider, charge nurse, etc)  Mattress: Follow bed algorithm, add Low Air Loss (Air+) mattress pump if skin is very moist or constantly moist.  HOB: Maintain at or below 30 degrees, unless contraindicated  Repositioning in bed: Every 1-2 hours , Left/right positioning; avoid supine, Raise foot of bed prior to raising head of bed, to reduce patient sliding down (shear), and Frequent microturns using positioning wedges, as patient tolerates  Heels: Keep elevated off mattress, Pillows under calves, and Heel lift boots ([employer] Heel boot, primary RN to obtain #[id number])  Protective Dressing: Sacral Mepilex for prevention (#369097),  especially for the agitated patient  Chair positioning: Chair cushion (#571660) , Assist patient to reposition hourly, and Do NOT use a donut for sitting (this increases pressure to smaller area and creates a higher potential for injury)    If patient has a buttock pressure injury, or high risk for PI use chair cushion or SPS.  Moisture Management: Perineal cleansing /protection: Follow Incontinence Protocol, Avoid brief in bed, Clean and dry skin folds with bathing , Consider InterDry (#302990) between folds, and Moisturize dry skin  Under Devices: Inspect skin under all medical devices during skin inspection , Ensure tubes " "are stabilized without tension, and Ensure patient is not lying on medical devices or equipment when repositioned  Ask provider to discontinue device when no longer needed.        01/21/25 0600  Wound care  DAILY        Comments: Right foot and heel: apply skin prep (sure prep or 3M no sting) to heel at least daily, let dry  Apply  Heel lift boots ([employer] Heel boot, primary RN to obtain #[id number]) per manufacture instructions        02/25/25 1613  Wound care  EVERY SHIFT        Comments: Location: buttock, sacrum and left buttock  Care: provided qshift by primary RN  1. Cleanse qshift with (ricarda for incontinence) with 4x4\" and vashe to open areas because patient reports pain with ricarda to wounds  2. Apply Ostomy powder Adapt stoma powder (unit closet, comes in a small cardboard box). Ok to apply to bleeding weeping open areas  3. Do not apply mepilex for this patient buttock  4. Turn side to side, avoid supine          Orders: Reviewed and Updated    RECOMMEND PRIMARY TEAM ORDER: None, at this time  Education provided: importance of repositioning, plan of care, Moisture management, Hygiene, and Off-loading pressure  Discussed plan of care with: Patient, Nurse, and aides x2  Notify WOC if wound(s) deteriorate.  Nursing to notify the Provider(s) and re-consult the WOC Nurse if new skin concern.    DATA:     Current support surface: Standard  Standard gel mattress (Isoflex)  Containment of urine/stool: Incontinence Protocol and Incontinent pad in bed  BMI: Body mass index is 32.97 kg/m .   Active diet order: Orders Placed This Encounter      Combination Diet Moderate Consistent Carb (60 g CHO per Meal) Diet; Easy to Chew (level 7); Thin Liquids (level 0) (Sit upright, stay up for 60+ minutes, alternate bites and sips, slow rate)     Output: I/O last 3 completed shifts:  In: 840 [P.O.:240; NG/GT:600]  Out: 0      Labs:   Recent Labs   Lab 02/23/25  0452   HGB 7.3*   WBC 5.4     Pressure injury risk assessment:   Sensory " Perception: 3-->slightly limited  Moisture: 3-->occasionally moist  Activity: 2-->chairfast  Mobility: 2-->very limited  Nutrition: 2-->probably inadequate  Friction and Shear: 1-->problem  Ben Score: 13    [place] CWOCN   1st choice: Securely message with [employer] ([facility])   (2nd option: [facility] Office Phone [phone], messages checked periodically Mon-Fri 8a-4p)

## 2025-02-25 NOTE — PROGRESS NOTES
[facility]     Renal Progress Note       SHORTHAND KEY FOR MY NOTES:  c = with, s = without, p = after, a = before, x = except, asx = asymptomatic, tx = transplant or treatment, sx = symptoms or symptomatic, cx = canceled or culture, rxn = reaction, yday = yesterday, nl = normal, abx = antibiotics, fxn = function, dx = diagnosis, dz = disease, m/h = melena/hematochezia, c/d/l/ha = cramping/dizziness/lightheadedness/headache, d/c = discharge or diarrhea/constipation, f/c/n/v = fevers/chills/nausea/vomiting, cp/sob = chest pain/shortness of breath, tbv = total body volume, rxn = reaction, tdc = tunneled dialysis catheter, pta = prior to admission, hd = hemodialysis, pd = peritoneal dialysis, hhd = home hemodialysis, edw = estimated dry wt         Assessment/Plan:     1.  ESKD.  Pt is due for HD tmrw.  A.  Orders placed.        Interval History:     Pt c some n/v p a coughing fit today.  She thinks HD is too long.           Medications and Allergies:     Current Facility-Administered Medications   Medication Dose Route Frequency Provider Last Rate Last Admin    - MEDICATION INSTRUCTIONS for Dialysis Patients -   Does not apply See Admin Instructions Akosua Garcia, [facility]        amiodarone (PACERONE) tablet 200 mg  200 mg Oral BID Carly Faye,    200 mg at 02/25/25 1008    amLODIPine (NORVASC) tablet 10 mg  10 mg Oral BID Braden Oneill MD   10 mg at 02/25/25 1008    apixaban ANTICOAGULANT (ELIQUIS) tablet 5 mg  5 mg Oral or Feeding Tube BID Don Trammell MD   5 mg at 02/25/25 1008    atorvastatin (LIPITOR) tablet 20 mg  20 mg Oral or Feeding Tube QPM Denver Shipman MD   20 mg at 02/24/25 2158    B and C vitamin Complex with folic acid (NEPHRONEX) liquid 5 mL  5 mL Per Feeding Tube Daily Denver Shipman MD   5 mL at 02/25/25 1007    budesonide (PULMICORT) neb solution 1 mg  1 mg Nebulization BID Denver Shipman MD   1 mg at 02/24/25 0718    calcitRIOL  (ROCALTROL) solution 0.5 mcg  0.5 mcg Oral or G tube Once per day on Monday Wednesday Friday Vivian Russell MD   0.5 mcg at 02/24/25 1527    cetirizine (zyrTEC) tablet 5 mg  5 mg Oral or Feeding Tube At Bedtime Denver Shipman MD   5 mg at 02/24/25 2158    cinacalcet (SENSIPAR) tablet 30 mg  30 mg Oral Once per day on Monday Wednesday Friday Chandu Rodriguez MD   30 mg at 02/24/25 1039    digoxin (LANOXIN) half-tab 62.5 mcg  62.5 mcg Oral Once per day on Monday Wednesday Friday Chandu Rodriguez MD   62.5 mcg at 02/24/25 1527    furosemide (LASIX) tablet 80 mg  80 mg Oral or Feeding Tube Daily Vivian Russell MD   80 mg at 02/25/25 1007    hydrALAZINE (APRESOLINE) tablet 75 mg  75 mg Oral Q8H [facility] Carly Faye,    75 mg at 02/25/25 1449    insulin aspart (NovoLOG) injection (RAPID ACTING)  1-7 Units Subcutaneous Q4H Carly Faye DO   2 Units at 02/25/25 0622    insulin glargine (LANTUS PEN) injection 15 Units  15 Units Subcutaneous BID Vivian Russell MD   15 Units at 02/25/25 1009    ipratropium (ATROVENT) 0.02 % neb solution 0.5 mg  0.5 mg Nebulization 4x daily Johanne Crowell MD   0.5 mg at 02/25/25 1451    levalbuterol (XOPENEX) neb solution 1.25 mg  1.25 mg Nebulization 4x daily Johanne Crowell MD   1.25 mg at 02/25/25 1451    miconazole (MICATIN) 2 % powder   Topical BID Denver Shipman MD   Given at 02/25/25 1009    midodrine (PROAMATINE) tablet 5 mg  5 mg Oral Once per day on Monday Wednesday Friday Vivian Russell MD   5 mg at 02/24/25 1039    pantoprazole (PROTONIX) 2 mg/mL suspension 40 mg  40 mg Per Feeding Tube QADenver Ellis MD   40 mg at 02/25/25 1006    Or    pantoprazole (PROTONIX) IV push injection 40 mg  40 mg Intravenous QADenver Ellis MD   40 mg at 02/24/25 0635    polyethylene glycol (MIRALAX) Packet 17 g  17 g Oral or Feeding Tube Daily Liset Romero MD   17 g at 02/23/25 0842    Prosource TF20 ENfit  "Compatibl EN LIQD (PROSOURCE TF20) packet 60 mL  1 packet Per Feeding Tube Daily Truman Moreland MD   60 mL at 02/24/25 1526    QUEtiapine (SEROquel) tablet 50 mg  50 mg Oral or Feeding Tube At Bedtime Cheo Watt MD   50 mg at 02/24/25 2158    sennosides (SENOKOT) syrup 10 mL  10 mL Oral or Feeding Tube BID Liset Romero MD   5 mL at 02/25/25 1007    sertraline (ZOLOFT) tablet 75 mg  75 mg Oral or Feeding Tube QPM Denver Shipman MD   75 mg at 02/24/25 2158    sodium chloride (PF) 0.9% PF flush 3 mL  3 mL Intracatheter Q8H Celine lPata APRN CNP   3 mL at 02/25/25 1449    tacrolimus (PROTOPIC) 0.1 % ointment   Topical BID Denver Shipman MD   Given at 02/25/25 1006    vitamin D3 (CHOLECALCIFEROL) tablet 50 mcg  50 mcg Oral or Feeding Tube Daily Denver Shipman MD   50 mcg at 02/25/25 1008     Allergies   Allergen Reactions    Ampicillin-Sulbactam Sodium Rash     No evidence SJS, but very uncomfortable and precipitated multiple provider visits. Would not use penicillins again if other options available.     Penicillins Rash          Physical Exam:     Vitals were reviewed     , Blood pressure 132/58, pulse 82, temperature 98  F (36.7  C), temperature source Oral, resp. rate 18, height 1.702 m (5' 7.01\"), weight 95.5 kg (210 lb 8.6 oz), SpO2 100%, not currently breastfeeding.  Wt Readings from Last 3 Encounters:   02/24/25 95.5 kg (210 lb 8.6 oz)   06/14/24 101.1 kg (222 lb 14.2 oz)   06/03/24 101.1 kg (222 lb 14.2 oz)     Intake/Output Summary (Last 24 hours) at 2/25/2025 1513  Last data filed at 2/25/2025 1457  Gross per 24 hour   Intake 840 ml   Output 0 ml   Net 840 ml     GENERAL APPEARANCE: pleasant, NAD, alert  RESP: CTA B c good efforts  CV: RRR, nl S1/S2   ABDOMEN: o/s/nt/nd  EXTREMITIES/SKIN: + edema  ACCESS:  LAF c good thrill/bruit         Data:     CBC RESULTS:     Recent Labs   Lab 02/23/25  0452 02/22/25  0535 02/21/25  0530 02/19/25  0536   WBC 5.4 6.7 6.6 5.9   RBC 2.40* 2.46* " 2.40* 2.49*   HGB 7.3* 7.6* 7.4* 7.6*   HCT 24.0* 24.7* 23.8* 24.2*    245 241 228     Basic Metabolic Panel:  Recent Labs   Lab 02/25/25  1448 02/25/25  1025 02/25/25  0621 02/25/25  0124 02/24/25  2117 02/24/25  1653 02/23/25  0653 02/23/25  0452 02/22/25  0601 02/22/25  0535 02/21/25  0546 02/21/25  0530 02/19/25  0643 02/19/25  0536   NA  --   --   --   --   --   --   --  134*  --  138  --  137  --  134*   POTASSIUM  --   --   --   --   --   --   --  3.8  --  3.7  --  4.1  --  3.9   CHLORIDE  --   --   --   --   --   --   --  94*  --  97*  --  97*  --  93*   CO2  --   --   --   --   --   --   --  29  --  30*  --  28  --  28   BUN  --   --   --   --   --   --   --  66.9*  --  43.6*  --  66.4*  --  77.8*   CR  --   --   --   --   --   --   --  3.96*  --  2.83*  --  4.02*  --  4.36*   * 140* 203* 176* 200* 154*   < > 198*   < > 192*   < > 204*   < > 221*   JODY  --   --   --   --   --   --   --  10.1  --  9.6  --  10.3  --  10.2    < > = values in this interval not displayed.     INRNo lab results found in last 7 days.   Attestation:   I have reviewed today's relevant vital signs, notes, medications, labs and imaging.    Manuel Doe MD  [facility] - Nephrology  [phone]

## 2025-02-26 ENCOUNTER — APPOINTMENT (OUTPATIENT)
Dept: CARDIOLOGY | Facility: CLINIC | Age: 75
DRG: 207 | End: 2025-02-26
Attending: NURSE PRACTITIONER
Payer: COMMERCIAL

## 2025-02-26 ENCOUNTER — MEDICAL CORRESPONDENCE (OUTPATIENT)
Dept: HEALTH INFORMATION MANAGEMENT | Facility: CLINIC | Age: 75
End: 2025-02-26

## 2025-02-26 VITALS
RESPIRATION RATE: 61 BRPM | SYSTOLIC BLOOD PRESSURE: 107 MMHG | BODY MASS INDEX: 32.01 KG/M2 | WEIGHT: 203.93 LBS | TEMPERATURE: 98.2 F | HEART RATE: 77 BPM | HEIGHT: 67 IN | DIASTOLIC BLOOD PRESSURE: 48 MMHG | OXYGEN SATURATION: 95 %

## 2025-02-26 LAB
ALBUMIN SERPL BCG-MCNC: 2.9 G/DL (ref 3.5–5.2)
ANION GAP SERPL CALCULATED.3IONS-SCNC: 13 MMOL/L (ref 7–15)
BUN SERPL-MCNC: 80.6 MG/DL (ref 8–23)
CALCIUM SERPL-MCNC: 9.5 MG/DL (ref 8.8–10.4)
CHLORIDE SERPL-SCNC: 95 MMOL/L (ref 98–107)
CREAT SERPL-MCNC: 4.54 MG/DL (ref 0.51–0.95)
EGFRCR SERPLBLD CKD-EPI 2021: 9 ML/MIN/1.73M2
ERYTHROCYTE [DISTWIDTH] IN BLOOD BY AUTOMATED COUNT: 16.8 % (ref 10–15)
GLUCOSE BLDC GLUCOMTR-MCNC: 107 MG/DL (ref 70–99)
GLUCOSE BLDC GLUCOMTR-MCNC: 185 MG/DL (ref 70–99)
GLUCOSE BLDC GLUCOMTR-MCNC: 200 MG/DL (ref 70–99)
GLUCOSE BLDC GLUCOMTR-MCNC: 209 MG/DL (ref 70–99)
GLUCOSE SERPL-MCNC: 237 MG/DL (ref 70–99)
HBV CORE AB SERPL QL IA: NONREACTIVE
HBV SURFACE AG SERPL QL IA: NONREACTIVE
HCO3 SERPL-SCNC: 27 MMOL/L (ref 22–29)
HCT VFR BLD AUTO: 23.2 % (ref 35–47)
HGB BLD-MCNC: 7 G/DL (ref 11.7–15.7)
HGB BLD-MCNC: 7.2 G/DL (ref 11.7–15.7)
MCH RBC QN AUTO: 30.3 PG (ref 26.5–33)
MCHC RBC AUTO-ENTMCNC: 30.2 G/DL (ref 31.5–36.5)
MCV RBC AUTO: 100 FL (ref 78–100)
PHOSPHATE SERPL-MCNC: 3.6 MG/DL (ref 2.5–4.5)
PLATELET # BLD AUTO: 201 10E3/UL (ref 150–450)
POTASSIUM SERPL-SCNC: 4.4 MMOL/L (ref 3.4–5.3)
RBC # BLD AUTO: 2.31 10E6/UL (ref 3.8–5.2)
SODIUM SERPL-SCNC: 135 MMOL/L (ref 135–145)
WBC # BLD AUTO: 5.8 10E3/UL (ref 4–11)

## 2025-02-26 PROCEDURE — 258N000003 HC RX IP 258 OP 636: Performed by: INTERNAL MEDICINE

## 2025-02-26 PROCEDURE — 999N000096 CARDIAC MOBILE TELEMETRY MONITOR

## 2025-02-26 PROCEDURE — 86704 HEP B CORE ANTIBODY TOTAL: CPT | Performed by: INTERNAL MEDICINE

## 2025-02-26 PROCEDURE — 99239 HOSP IP/OBS DSCHRG MGMT >30: CPT | Performed by: HOSPITALIST

## 2025-02-26 PROCEDURE — 250N000013 HC RX MED GY IP 250 OP 250 PS 637: Performed by: HOSPITALIST

## 2025-02-26 PROCEDURE — 87340 HEPATITIS B SURFACE AG IA: CPT | Performed by: INTERNAL MEDICINE

## 2025-02-26 PROCEDURE — 250N000013 HC RX MED GY IP 250 OP 250 PS 637: Performed by: INTERNAL MEDICINE

## 2025-02-26 PROCEDURE — 634N000001 HC RX 634: Mod: JZ | Performed by: INTERNAL MEDICINE

## 2025-02-26 PROCEDURE — 250N000009 HC RX 250: Performed by: INTERNAL MEDICINE

## 2025-02-26 PROCEDURE — 250N000009 HC RX 250: Performed by: HOSPITALIST

## 2025-02-26 PROCEDURE — 80069 RENAL FUNCTION PANEL: CPT | Performed by: INTERNAL MEDICINE

## 2025-02-26 PROCEDURE — 36415 COLL VENOUS BLD VENIPUNCTURE: CPT | Performed by: INTERNAL MEDICINE

## 2025-02-26 PROCEDURE — 90935 HEMODIALYSIS ONE EVALUATION: CPT | Performed by: INTERNAL MEDICINE

## 2025-02-26 PROCEDURE — 94640 AIRWAY INHALATION TREATMENT: CPT

## 2025-02-26 PROCEDURE — 85014 HEMATOCRIT: CPT | Performed by: INTERNAL MEDICINE

## 2025-02-26 PROCEDURE — 999N000157 HC STATISTIC RCP TIME EA 10 MIN

## 2025-02-26 PROCEDURE — 90937 HEMODIALYSIS REPEATED EVAL: CPT

## 2025-02-26 PROCEDURE — 250N000012 HC RX MED GY IP 250 OP 636 PS 637: Performed by: HOSPITALIST

## 2025-02-26 PROCEDURE — 85018 HEMOGLOBIN: CPT | Performed by: HOSPITALIST

## 2025-02-26 RX ORDER — B COMPLEX C NO.10/FOLIC ACID 900MCG/5ML
5 LIQUID (ML) ORAL DAILY
Status: ON HOLD | DISCHARGE
Start: 2025-02-27 | End: 2025-03-05

## 2025-02-26 RX ORDER — ALBUMIN (HUMAN) 12.5 G/50ML
50 SOLUTION INTRAVENOUS
Status: DISCONTINUED | OUTPATIENT
Start: 2025-02-26 | End: 2025-02-26

## 2025-02-26 RX ORDER — AMLODIPINE BESYLATE 5 MG/1
10 TABLET ORAL 2 TIMES DAILY
DISCHARGE
Start: 2025-02-26 | End: 2025-03-09

## 2025-02-26 RX ORDER — HYDRALAZINE HYDROCHLORIDE 25 MG/1
75 TABLET, FILM COATED ORAL EVERY 8 HOURS
Status: ON HOLD | DISCHARGE
Start: 2025-02-26

## 2025-02-26 RX ORDER — FUROSEMIDE 80 MG/1
80 TABLET ORAL DAILY
Status: ON HOLD | DISCHARGE
Start: 2025-02-26 | End: 2025-03-16

## 2025-02-26 RX ORDER — MIDODRINE HYDROCHLORIDE 5 MG/1
5 TABLET ORAL
Status: ON HOLD | DISCHARGE
Start: 2025-02-26 | End: 2025-03-16

## 2025-02-26 RX ORDER — AMIODARONE HYDROCHLORIDE 200 MG/1
200 TABLET ORAL 2 TIMES DAILY
Status: ON HOLD | DISCHARGE
Start: 2025-02-26 | End: 2025-03-16

## 2025-02-26 RX ORDER — IPRATROPIUM BROMIDE AND ALBUTEROL SULFATE 2.5; .5 MG/3ML; MG/3ML
3 SOLUTION RESPIRATORY (INHALATION) EVERY 4 HOURS PRN
Status: ON HOLD | DISCHARGE
Start: 2025-02-26

## 2025-02-26 RX ORDER — INSULIN GLARGINE 100 [IU]/ML
15 INJECTION, SOLUTION SUBCUTANEOUS 2 TIMES DAILY
Status: ON HOLD | DISCHARGE
Start: 2025-02-26 | End: 2025-03-06

## 2025-02-26 RX ADMIN — LIDOCAINE HYDROCHLORIDE 0.5 ML: 10 INJECTION, SOLUTION EPIDURAL; INFILTRATION; INTRACAUDAL; PERINEURAL at 09:51

## 2025-02-26 RX ADMIN — SODIUM CHLORIDE 250 ML: 9 INJECTION, SOLUTION INTRAVENOUS at 09:51

## 2025-02-26 RX ADMIN — INSULIN GLARGINE 15 UNITS: 100 INJECTION, SOLUTION SUBCUTANEOUS at 01:47

## 2025-02-26 RX ADMIN — Medication 40 MG: at 07:42

## 2025-02-26 RX ADMIN — SODIUM CHLORIDE 200 ML: 9 INJECTION, SOLUTION INTRAVENOUS at 09:51

## 2025-02-26 RX ADMIN — IPRATROPIUM BROMIDE 0.5 MG: 0.5 SOLUTION RESPIRATORY (INHALATION) at 07:34

## 2025-02-26 RX ADMIN — CALCITRIOL 0.5 MCG: 1 SOLUTION ORAL at 07:45

## 2025-02-26 RX ADMIN — CINACALCET 30 MG: 30 TABLET ORAL at 07:48

## 2025-02-26 RX ADMIN — MIDODRINE HYDROCHLORIDE 5 MG: 5 TABLET ORAL at 07:42

## 2025-02-26 RX ADMIN — Medication: at 09:52

## 2025-02-26 RX ADMIN — APIXABAN 5 MG: 5 TABLET, FILM COATED ORAL at 07:43

## 2025-02-26 RX ADMIN — ACETAMINOPHEN 650 MG: 325 TABLET, FILM COATED ORAL at 09:45

## 2025-02-26 RX ADMIN — TACROLIMUS: 1 OINTMENT TOPICAL at 07:42

## 2025-02-26 RX ADMIN — EPOETIN ALFA-EPBX 10000 UNITS: 10000 INJECTION, SOLUTION INTRAVENOUS; SUBCUTANEOUS at 09:52

## 2025-02-26 RX ADMIN — LEVALBUTEROL HYDROCHLORIDE 1.25 MG: 1.25 SOLUTION RESPIRATORY (INHALATION) at 07:34

## 2025-02-26 RX ADMIN — SENNOSIDES 10 ML: 8.8 LIQUID ORAL at 07:43

## 2025-02-26 RX ADMIN — Medication 50 MCG: at 07:44

## 2025-02-26 RX ADMIN — MICONAZOLE NITRATE: 2 POWDER TOPICAL at 07:42

## 2025-02-26 RX ADMIN — AMIODARONE HYDROCHLORIDE 200 MG: 200 TABLET ORAL at 07:44

## 2025-02-26 ASSESSMENT — ACTIVITIES OF DAILY LIVING (ADL)
ADLS_ACUITY_SCORE: 82
ADLS_ACUITY_SCORE: 84
ADLS_ACUITY_SCORE: 82
ADLS_ACUITY_SCORE: 84
ADLS_ACUITY_SCORE: 82
ADLS_ACUITY_SCORE: 82
ADLS_ACUITY_SCORE: 84
ADLS_ACUITY_SCORE: 82
ADLS_ACUITY_SCORE: 84
ADLS_ACUITY_SCORE: 82

## 2025-02-26 NOTE — PROGRESS NOTES
"  DIALYSIS PROCEDURE NOTE  Hepatitis status of previous patient on machine log was checked and verified ok to use with this patients hepatitis status.  Patient dialyzed for 3 hrs. on a K3 bath with a net fluid removal of  1.5L.  A BFR of 450 ml/min was obtained via a left AVF using 15 gauge needles.      The treatment plan was discussed with Dr. Oneill during the treatment.    Total heparin received during the treatment: 0 units.   Needle cannulation sites held x 15 min.        Meds  given: 10,000 units of epo   Complications: During first 30 minutes of run patient reported \"fluttering in chest\", atrial fibrillation on monitor for 3 minutes, HR 78, , UF off, 2 LPM of O2 via nasal cannula applied,  MD Doe notified via phone, goal lowered to 2.5 KG per MD verbal order. Goal lowered to 1.5 per MD Oneill.      Person educated: patient . Knowledge base substantial. Barriers to learning: none. Educated on procedure via verbal mode. The patient verbalized understanding. Pt prefers verbal education style.      ICEBOAT? Timeout performed pre-treatment  I: Patient was identified using 2 identifiers  C:  Consent Signed Yes  E: Equipment preventative maintenance is current and dialysis delivery system OK to use  B: Hepatitis B Surface Antigen: Negative; Draw Date: 2/6/25      Hepatitis B Surface Antibody: Susceptible; Draw Date: 2/6/25  O: Dialysis orders present and complete prior to treatment  A: Vascular access verified and assessed prior to treatment  T: Treatment was performed at a clinically appropriate time  ?: Patient was allowed to ask questions and address concerns prior to treatment  See Adult Hemodialysis flowsheet in [facility] for further details and post assessment.  Machine water alarm in place and functioning. Transducer pods intact and checked every 15min.   Pt returned via bed.  Chlorine/Chloramine water system checked every 4 hours.  Outpatient Dialysis at [facility] on MWF.      Patient repositioned " every 2 hours during the treatment.  Post treatment report given to RADHA Lewis RN regarding 1.5L of fluid removed, last BP of 107/48, and patient pain rating of 0/10.      Please remove patient dressing on AVF and AVG needle sites 24 hours after dialysis. If leaking occurs please apply a Band-Aid.

## 2025-02-26 NOTE — PROGRESS NOTES
EP chart check:  Smart disclosure reviewed.  Patient did have episode of PAF last night.  Was given as needed metoprolol 2.5 mg x 1.  Patient converted approximately 1 hour later to sinus rhythm.    Patient still is stable on amiodarone therapy 200 mg twice daily.  The plan is to do a ZIO AT monitor at time of discharge.  Please reassure patient and daughter that she will be monitored from an arrhythmia standpoint.    Will stop 62.5 mg digoxin that was scheduled 3 times a week.  Patient scheduled to see me 3/14 in the clinic.    Please contact us with any questions or concerns.  Ramila Mayo NP, APRN CNP  Beeper:[phone]

## 2025-02-26 NOTE — PLAN OF CARE
Physical Therapy Discharge Summary    Reason for therapy discharge:    Discharged to transitional care facility.    Progress towards therapy goal(s). See goals on Care Plan in [facility] electronic health record for goal details.  Goals not met.  Barriers to achieving goals:   discharge from facility.    Therapy recommendation(s):    Continued therapy is recommended.  Rationale/Recommendations:   . Pt tolerated session well. Pt below baseline functional independence. Pt limited by prolonged hospitalization, decreased strength, decreased activity tolerance, impaired balance, fall risk. Pt at baseline lives in house with daughter Caroline, uses manual WC for locomotion, able to pivot to WC, ind. Pt currently requiring Ax2 via lift for transfers, able to self propel in manual WC short distance this date. Pt would benefit from continued skilled therapy at TCU prior to returning home safely. Will continue to update as appropriate.  PT Brief overview of current status: recommend nursing lift pt up to chair 3x per day  PT Total Distance Amb During Session (feet): 0

## 2025-02-26 NOTE — PROGRESS NOTES
"18:00 - Called into pt's room by daughter who noticed pt's HR was elevated on pulse ox. Attached to tele which shows Afib RVR. BP stable. PRN IV metoprolol 2.5mg given. O2 sats in mid 80s, placed on 2 LPM nasal cannula.     Daughter expressing hesitancy regarding pt's arranged TCU discharge tomorrow. Feels as though her heart rhythm and rate are not yet under appropriate control. Anxious about patient being at a facility without a heart monitor as we \"won't be able to catch these incidents.\"     ADDENDUM: 1917 - Pt converted back to SR.  "

## 2025-02-26 NOTE — PROGRESS NOTES
Care Management Discharge Note    Discharge Date: 02/26/2025       Discharge Disposition: Skilled Nursing Facility    Discharge Services: None    Discharge DME: None    Discharge Transportation: family or friend will provide    Private pay costs discussed: Not applicable    Does the patient's insurance plan have a 3 day qualifying hospital stay waiver?  Yes     Which insurance plan 3 day waiver is available? [employer]    Will the waiver be used for post-acute placement? No    PAS Confirmation Code: [id number]  Patient/family educated on Medicare website which has current facility and service quality ratings: no    Education Provided on the Discharge Plan: No  Persons Notified of Discharge Plans: yes, daughter  Patient/Family in Agreement with the Plan: yes    Handoff Referral Completed: No, handoff not indicated or clinically appropriate    Additional Information:  [facility] will [facility] via [facility] stretcher ride between [id number]. Patient aware and in agreement. HIGINIO also spoke with patient's daughter and answered many of her questions. She is understanding that this is the only facility but is not happy with facility ratings. She is ok with patient going today. HIGINIO did provide her with the facility SW number and let her know to reach out with any questions or concerns.     HIGINIO did set up transport for patient's dialysis appts for Friday 2/28 and Monday 3/3.  time from facility is 1100 and then  from dialysis center is 1430. Transport is through [facility]: [phone]. Bedside nurse confirmed tube feeding food is present in patient's room to be sent over.     No further discharge needs at this time.     PAS-RR    D: Per DHS regulation, HIGINIO completed and submitted PAS-RR to [place] [employer] Direct Connect via the [facility] LinkAge Line.  PAS-RR confirmation # is : [id number]    P: Further questions may be directed to [facility] LinkAge Line at #[phone], option #4  for PAS-RR staff.      NOBLE Nagel

## 2025-02-26 NOTE — DISCHARGE SUMMARY
[facility]  Discharge Summary        Supriya Herr MRN# [id number]   YOB: 1950 Age: 75 year old     Date of Admission:  1/8/2025  Date of Discharge:  2/26/2025  Admitting Physician:  Chandu Rodriguez MD  Discharge Physician: Chandu Rodriguez MD  Discharging Service: Hospitalist     Primary Provider: Noam Jackson  Primary Care Physician Phone Number: [phone]         Discharge Diagnoses/Problem Oriented Hospital Course (Providers):    Supriya Herr was admitted on 1/8/2025 by Chandu Rodriguez MD and I would refer you to their history and physical.  The following problems were addressed during her hospitalization:    Supriya Herr is a 74 year old female with PMHx significant for ESRD on HD, CHF, COPD, poor mobility (Lt AKA, Obesity, WC bound), DM type II, hypertension.  She was brought to the ER by EMS for evaluation of shortness of breath, diagnosed with influenza A and admitted on 1/8/25       Hospital course summary  She was intubated for worsening respiratory failure and remained on mechanical ventilator 1/9 - 1/18; reintubated (1/20 - 1/25).  Was treated with Tamiflu, IV antibiotic, steroid, has completed courses.  Hospital course complicated by A-fib RVR.  Was on amiodarone drip with eventual transition to p.o. Also required diltiazem  for rate control.  Required BiPAP--HFNC--intermittent BiPAP use.  Ongoing tube feeding and modified diet per SLP.  Delirium managed with Seroquel.  Concern for recurrent pneumonia, hospital-acquired on 2/1, restarted on antibiotics.  ID consulted, recommended discontinuing antibiotics, respiratory failure likely related to fluid overload.     Patient developed symptomatic bradycardia on 2/5/25 triggering a RRT. She was placed on dopamine drip and transferred back to the ICU. Bradycardia was likely due to rate controlling medications. Cardiology consulted with adjustment of medications. Patient has remained in  NSR without recurrence of bradycardia on amio drip. While in the ICU, patient required dialysis on pressors, now transitioned to midodrine . Hypoxic and hypercarbic resp failure is much improved with dialysis.      Patient transferred back to hospitalist service on 2/8/25. Recurrent issues with atrial fib with RVR. Disposition now pending TCU placement.     Acute hypoxic respiratory failure requiring mechanical ventilation  Influenza A pneumonia  Acute COPD exacerbation  Hospital-acquired pneumonia  Pharyngeal edema  - Initial presentation with shortness of breath, diagnosed influenza A; prolonged ICU course requiring mechanical ventilation as noted above.  *1/20 bronchoscopy and BAL: culture grew actinomyces  *CT chest showed RML infiltrate versus atelectasis but no sign of actinomycosis, intensivist felt this was likely colonization.    *Pneumonia treated with cefepime and vancomycin, then Levaquin. Completed course 1/26.  *Flu and COPD exacerbation was treated with IV steroids and tamiflu.  *2/1 had increased O2 needs, concern for new infection. repeat blood cx --negative. CT chest showed possible right lower lobe pneumonia and ID was consulted and patient was again started on vancomycin and Levaquin, then subsequently discontinued as respiratory failure was determined to be due to fluid overload as she improved with dialysis.  - Continue duonebs 4 times daily prn  -respiratory status stable ; occasionally needing 1 to 2 L; wean oxygen as tolerated at TCU     Recurrent atrial fibrillation with RVR  Acute on chronic CHFpEF  Hypertension  Shock, due to sedation-resolved  Symptomatic bradycardia-Resolved  - PTA amlodipine 5 Mg BID, Coreg 25 Mg BID and Lasix.  *1/21/25 TTE: LVEF 60%, no pericardial effusion, no significant valvular disease, diastolic function indeterminate.  No WMA.  *2/2/25 had symptomatic bradycardia due to rate controlling medications requiring RRT. Placed on dopamine gtt, coreg and diltiazem were  held. Her heart went back into normal sinus and only amiodarone 200mg daily was continued at that point.  *2/10/25: developed atrial fib with RVR, amiodarone gtt restarted and PO dose increased from daily to BID.  *2/12/25: restarted amlodipine and oral hydralazine for uptrending BPs  *2/15/25: recurrent atrial fib with RVR, RRT called, amiodarone gtt restarted.  - Cardiology has followed on/off due to recurrent atrial fib and bradycardia.   - Intermittent needs for amiodarone gtt, currently on amiodarone oral 200mg BID.  - initial plan for PPM on 2/18; now discontinued per EP (signed off 2/19). No indication for PPM at this time; was started on digoxin as well  - Continue with atorvastatin 20 mg daily and Lasix 80 mg daily (hold on dialysis days)  - started on Eliquis 5mg BID  - continue amlodipine 10 mg BID, hydralazine 75mg q8h, amiodarone 200 mg BID  - gets midodrine prior to dialysis    - on 2/25 went into a fib RVR again, was given a dose of IV metoprolol 2.5 mg and subsequently converted into sinus rhythm  - EP consulted again (2/26) given recurrence of a fib prior to discharge; EP suggested to continue amiodarone 200 mg BID until follow up with EP clinic on 3/14/25 and ordered for Zio patch at discharge; EP d/mahsa 62.5 mg digoxin    Encephalopathy/delirium likely infectious and metabolic-improved  Intermittent twitchings  Anxiety  *1/20/25 CT head: negative for acute intracranial process; Suspected some baseline cognitive impairment.    *2/10/25: CT head: no acute intracranial process.   *2/10/25 EEG: showed moderate generalized slowing and triphasic waves which per neurology commonly seen in chronic kidney disease.   - Of note has had on and off jerking movements/twitchings during her hospital stay  - Neurology evaluated (2/11) and recommended IV thiamine supplements X 5 days  - stable since 2/11/25: AOx2-3, fluctuating mental status but has mostly been stable now  - was started on Seroquel 50 mg at bedtime;  her mentation has remained stable for several days , thus to avoid risk of arrhythmias/QTC prolongation with amiodarone use Seroquel was discontinued at the time of discharge  - Continue PTA Zoloft   - PT, OT recommend TCU     Diabetes mellitus, type 2  HbA1c 6.5% on 1/17/25. PTA Lantus 18u qd and NovoLog 5u TIDWM  - Lantus 15 units BID resumed on 2/21 and BG seems reasonably well controlled on current regimen     ESRD on HD , wasTue-Thu-Sat, now MWF  Anemia of chronic disease  Hyperkalemia-resolved  -Nephrology following, currently MWF dialysis  -Hemoglobin 7-8, at baseline.  -Gets EPO with hemodialysis.  -Continue vitamin D3.    -Nephrology resumed Lasix on nondialysis days on 2/3.    -continue midodrine daily MWF with dialysis  -Lokelma discontinued on 2/10      Dysphagia  Suspect due to recurrent intubation. Did well with video swallow study on 1/31/25, diet advanced.  But again was made n.p.o. on 2/11  *2/16 ENT evaluated with flexible scope: mild edema of arytenoids. No candida infection of pharynx of larynx. No findings to explain dysphagia  *2/17 G tube placed with IR and initiated on tube feeds, nutrition following  - SLP following to assess for PO intake; will have SLP follow up at TCU; might need to consider outpatient G.I. evaluation if no significant improvement in dysphagia     Likely cerumen impaction- resolved  2/11 reported ear pressure and some ringing  - improved with debrox drops added 2/12, finished course 2/16. Ears clear when evaluated by ENT on 2/16       Code Status: Full Code       Diet:   Adult Formula Drip Feeding: Everyone Counts Renal Support; Gastrostomy; Goal Rate: 65; mL/hr; From: 4:00 PM; To: 6:00 AM; run x14 hours nightly for total volume 910 mL (orders updated 2/18 to reflect new enteral access)  Combination Diet Moderate Consistent Carb (60 g CHO per Meal) Diet; Easy to Chew (level 7); Thin Liquids (level 0) (Sit upright, stay up for 60+ minutes, alternate bites and sips,  "slow rate)       Disposition:   Medically Ready for Discharge:     Disposition was delayed due to difficulty finding TCU which was eventually available on 2/26/25;  assisted in disposition      Clinically Significant Risk Factors          # Hypochloremia: Lowest Cl = 95 mmol/L in last 2 days, will monitor as appropriate      # Hypoalbuminemia: Lowest albumin = 2.8 g/dL at 1/13/2025  5:14 AM, will monitor as appropriate       # Hypertension: Noted on problem list  # Chronic heart failure with preserved ejection fraction: heart failure noted on problem list and last echo with EF >50%      # Acute Hypoxic Respiratory Failure: Documented O2 saturation < 90%. Continue supplemental oxygen as needed  # Acute Hypercapnic Respiratory Failure: based on venous blood gas results.  Continue supplemental oxygen and ventilatory support as indicated.          # DMII: A1C = 6.5 % (Ref range: <5.7 %) within past 6 months   # Obesity: Estimated body mass index is 31.93 kg/m  as calculated from the following:    Height as of this encounter: 1.702 m (5' 7.01\").    Weight as of this encounter: 92.5 kg (203 lb 14.8 oz).        # Financial/Environmental Concerns: none                           Pending Results:        Unresulted Labs Ordered in the Past 30 Days of this Admission       Date and Time Order Name Status Description    2/26/2025  8:42 AM Hepatitis B surface antigen In process     2/26/2025  8:42 AM Hepatitis B core antibody In process     1/20/2025  8:19 PM Acid-Fast Bacilli Culture and Stain In process                  Discharge Instructions and Follow-Up:      Follow-up Appointments     Follow Up      Resume your usual dialysis schedule at [facility] on MWF   arrival at 11:30 AM  Located [address] [place] [id number] ([phone])        Follow Up and recommended labs and tests      Follow up with [facility] physician.  The following labs/tests are   recommended: repeat CBC, BMP in " 2-3 days or as recommended per   dialysis/nephrology.    Continue Dialysis Monday-wednesay- Friday schedule.    Tube feeds per nutrition.    Continue tube feeds per nutrition.    Follow up with EP/Cardiology as scheduled/suggested.    Continue CPAP at home settings.                 Discharge Disposition:      Discharged to short-term care facility         Discharge Medications:        Current Discharge Medication List        START taking these medications    Details   amiodarone (PACERONE) 200 MG tablet Take 1 tablet (200 mg) by mouth 2 times daily.    Associated Diagnoses: Persistent atrial fibrillation (H)      apixaban ANTICOAGULANT (ELIQUIS) 5 MG tablet Take 1 tablet (5 mg) by mouth or Feeding Tube 2 times daily.    Associated Diagnoses: Paroxysmal atrial fibrillation (H)      B and C vitamin Complex with folic acid (NEPHRONEX) 0.9 MG/5ML LIQD liquid Place 5 mLs into Feeding Tube daily.    Associated Diagnoses: ESRD (end stage renal disease) (H)      hydrALAZINE (APRESOLINE) 25 MG tablet Take 3 tablets (75 mg) by mouth every 8 hours.    Associated Diagnoses: Hypertension goal BP (blood pressure) < 140/90      ipratropium - albuterol 0.5 mg/2.5 mg/3 mL (DUONEB) 0.5-2.5 (3) MG/3ML neb solution Take 1 vial (3 mLs) by nebulization every 4 hours as needed for wheezing or shortness of breath.    Associated Diagnoses: Influenza A      midodrine (PROAMATINE) 5 MG tablet Take 1 tablet (5 mg) by mouth three times a week. (Prior to dialysis)    Associated Diagnoses: ESRD (end stage renal disease) (H)      pantoprazole (PROTONIX) 2 mg/mL SUSP suspension Place 20 mLs (40 mg) into Feeding Tube every morning (before breakfast).    Associated Diagnoses: Gastroesophageal reflux disease with esophagitis without hemorrhage           CONTINUE these medications which have CHANGED    Details   amLODIPine (NORVASC) 5 MG tablet Take 2 tablets (10 mg) by mouth 2 times daily. (Hold for SBP<100)    Associated Diagnoses: Hypertension goal  BP (blood pressure) < 140/90      furosemide (LASIX) 80 MG tablet Take 1 tablet (80 mg) by mouth or Feeding Tube daily.    Associated Diagnoses: CKD (chronic kidney disease) stage 5, GFR less than 15 ml/min (H); Anemia due to stage 5 chronic kidney disease, 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 (H)      insulin glargine 100 UNIT/ML pen Inject 15 Units subcutaneously 2 times daily.    Comments: If Lantus is not covered by insurance, may substitute Basaglar or Semglee or other insulin glargine product per insurance preference at same dose and frequency.    Associated Diagnoses: Type 2 diabetes mellitus with diabetic neuropathy, with long-term current use of insulin (H)           CONTINUE these medications which have NOT CHANGED    Details   acetaminophen (TYLENOL) 325 MG tablet Take 2 tablets (650 mg) by mouth every 4 hours as needed for mild pain  Qty: 50 tablet, Refills: 0    Associated Diagnoses: ESRD on dialysis (H)      albuterol (PROAIR HFA/PROVENTIL HFA/VENTOLIN HFA) 108 (90 Base) MCG/ACT inhaler Inhale 2 puffs into the lungs every 6 hours as needed for shortness of breath / dyspnea or wheezing  Qty: 3 Inhaler, Refills: 1    Comments: Pharmacy may dispense brand covered by insurance (Proair, or proventil or ventolin or generic albuterol inhaler)  Associated Diagnoses: Cough      atorvastatin (LIPITOR) 20 MG tablet Take 1 tablet (20 mg) by mouth every evening.  Qty: 90 tablet, Refills: 3    Associated Diagnoses: Hypercholesterolemia      calcitRIOL (ROCALTROL) 0.5 MCG capsule Take 0.5 mcg by mouth three times a week. Given at dialysis      cetirizine (ZYRTEC) 10 MG tablet Take 10 mg by mouth at bedtime.      cinacalcet (SENSIPAR) 30 MG tablet Take 30 mg by mouth three times a week. Given at dialysis three times a week      methoxy PEG-Epoetin Beta (MIRCERA) 30 MCG/0.3ML SOSY injectable syringe 30 mcg every 14 days. Given at dialysis      miconazole (MICATIN) 2 % external powder Apply topically 2 times daily as needed (redness  under breasts/groin) Apply twice daily to skin folds as needed  Qty: 90 g, Refills: 11    Associated Diagnoses: Intertrigo      olopatadine (PATANOL) 0.1 % ophthalmic solution Place 1 drop into both eyes 2 times daily as needed for allergies.      !! sertraline (ZOLOFT) 25 MG tablet TAKE 1  TABLET BY MOUTH EVERY DAY along with a 50 mg tablet for a total of 75 mg  Qty: 90 tablet, Refills: 3    Associated Diagnoses: NICOLE (generalized anxiety disorder)      !! sertraline (ZOLOFT) 50 MG tablet Take 1 tablet (50 mg) by mouth at bedtime  Qty: 90 tablet, Refills: 3    Comments: Takes with 25 mg for total of 75 mg. Needs to fill now  Associated Diagnoses: NICOLE (generalized anxiety disorder)      sevelamer carbonate (RENVELA) 800 MG tablet Take two with meals and one with snacks  Qty: 300 tablet, Refills: 11    Associated Diagnoses: CKD (chronic kidney disease) stage 5, GFR less than 15 ml/min (H)      tacrolimus (PROTOPIC) 0.1 % external ointment Apply topically 2 times daily. To skin folds  Qty: 60 g, Refills: 5    Associated Diagnoses: Inverse psoriasis      triamcinolone (KENALOG) 0.025 % external ointment Apply topically 2 times daily. To rash under breasts and groin as needed  Qty: 80 g, Refills: 11    Associated Diagnoses: Inverse psoriasis      Vitamin D3 50 mcg (2000 units) tablet TAKE ONE TABLET BY MOUTH ONCE DAILY  Qty: 90 tablet, Refills: 2    Associated Diagnoses: Vitamin D deficiency      blood glucose (NO BRAND SPECIFIED) test strip Use to test blood sugar 3 times daily or as directed.whatever is covered  Qty: 300 strip, Refills: 3    Associated Diagnoses: Type 2 diabetes mellitus with diabetic nephropathy, with long-term current use of insulin (H)      blood glucose (ONE TOUCH DELICA) lancing device Device to be used with lancets.needs lancets device for delica lancets  Qty: 1 each, Refills: 1    Associated Diagnoses: Type 2 diabetes mellitus with diabetic chronic kidney disease, unspecified CKD stage,  unspecified whether long term insulin use (H)      blood glucose monitoring (NO BRAND SPECIFIED) meter device kit Use to test blood sugar 3 times daily or as directed. Whatever is covered  Qty: 1 kit, Refills: 1    Associated Diagnoses: Type 2 diabetes mellitus with diabetic nephropathy, with long-term current use of insulin (H)      blood glucose monitoring (ULTRA THIN 30G) lancets Use to test blood sugar 3 times daily or as directed.watever is covered  Qty: 300 each, Refills: 3    Associated Diagnoses: Type 2 diabetes mellitus with diabetic nephropathy, with long-term current use of insulin (H)      Continuous Blood Gluc  (FREESTYLE PHOENIX 2 READER) BELKYS Use to read blood sugars as per 's instructions.  Qty: 1 each, Refills: 0    Associated Diagnoses: Type 2 diabetes mellitus with diabetic neuropathy, with long-term current use of insulin (H)      !! Continuous Glucose Sensor (FREESTYLE PHOENIX 2 SENSOR) MISC Change every 14 days.  Qty: 6 each, Refills: 0    Associated Diagnoses: Type 2 diabetes mellitus with diabetic neuropathy, with long-term current use of insulin (H); Cellulitis of right lower extremity      !! Continuous Glucose Sensor (FREESTYLE PHOENIX 2 SENSOR) MISC Change every 14 days.  Qty: 6 each, Refills: 5    Associated Diagnoses: Type 2 diabetes mellitus with diabetic neuropathy, with long-term current use of insulin (H)      !! Continuous Glucose Sensor (FREESTYLE PHOENIX 2 SENSOR) MISC Change every 14 days.  Qty: 6 each, Refills: 5    Associated Diagnoses: Type 2 diabetes mellitus with diabetic neuropathy, with long-term current use of insulin (H)      Glucagon (BAQSIMI ONE PACK) 3 MG/DOSE POWD Spray 3 mg in nostril See Admin Instructions USE ONLY FOR SEVERE HYPOGLYCEMIA.  Qty: 1 each, Refills: 3    Associated Diagnoses: Type 2 diabetes mellitus with hyperglycemia, with long-term current use of insulin (H)      insulin pen needle (BD PEN NEEDLE ALEX 2ND GEN) 32G X 4 MM miscellaneous  "USE 4 DAILY WITH INSULIN INJECTIONS.  Qty: 400 each, Refills: 1    Associated Diagnoses: Type 2 diabetes mellitus with hyperglycemia, with long-term current use of insulin (H)      !! order for DME Equipment being ordered: mattress overlay for hospital bed  Wt. 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 order for DME 1 wheelchair  Qty: 1 Device, Refills: 0    Associated Diagnoses: Traumatic amputation of lower extremity above knee, unspecified laterality, subsequent encounter; CKD (chronic kidney disease) stage 3, GFR 30-59 ml/min (H); Type 2 diabetes mellitus with stage 3 chronic kidney disease, without long-term current use of insulin (H)       !! - Potential duplicate medications found. Please discuss with provider.        STOP taking these medications       carvedilol (COREG) 25 MG tablet Comments:   Reason for Stopping:         docusate sodium (COLACE) 50 MG capsule Comments:   Reason for Stopping:         gabapentin (NEURONTIN) 100 MG capsule Comments:   Reason for Stopping:         insulin aspart (NOVOLOG FLEXPEN) 100 UNIT/ML pen Comments:   Reason for Stopping:         pantoprazole (PROTONIX) 40 MG EC tablet Comments:   Reason for Stopping:         silver sulfADIAZINE (SILVADENE) 1 % external cream Comments:   Reason for Stopping:                    Allergies:         Allergies   Allergen Reactions    Ampicillin-Sulbactam Sodium Rash     No evidence SJS, but very uncomfortable and precipitated multiple provider visits. Would not use penicillins again if other options available.     Penicillins Rash            Consultations This Hospital Stay:      Consultation during this admission received from intensivist, " "nephrology, cardiology, infectious disease, neurology, ENT and electrophysiology         Condition and Physical Exam on Discharge:        Discharge condition: Stable   Discharge vitals: Blood pressure 107/48, pulse 77, temperature 98.2  F (36.8  C), temperature source Oral, resp. rate (!) 61, height 1.702 m (5' 7.01\"), weight 92.5 kg (203 lb 14.8 oz), SpO2 95%, not currently breastfeeding.     Constitutional: Alert, awake and oriented; resting comfortably in no apparent distress         Oral cavity: Moist mucosa   Cardiovascular: Normal s1 s2, regular rate and rhythm, no murmur   Lungs: B/l clear to auscultation, no wheezes or crepitations   Abdomen: Soft, nt, nd, no guarding, rigidity or rebound; BS +; G tube in place   LE : No edema on right; left AKA status   Musculoskeletal/Neuro Power 5/5 in all extremities; No focal neurological deficits noted   Psychiatry: normal mood and affect               Discharge Orders for Skilled Facility (from Discharge Orders):        After Care Instructions       Activity - Up ad edvin      Diet      Follow this diet upon discharge:       Adult Formula Drip Feeding: [place] Renal Support; Gastrostomy; Goal Rate: 65; mL/hr; From: 4:00 PM; To: 6:00 AM; run x14 hours nightly for total volume 910 mL (orders updated 2/18 to reflect new enteral access)      Combination Diet Moderate Consistent Carb (60 g CHO per Meal) Diet; Easy to Chew (level 7); Thin Liquids (level 0) (Sit upright, stay up for 60+ minutes, alternate bites and sips,        Discharge Instructions      If questions or problems arise regarding tube function (e.g. leaking, dislodges, etc.) Contact Interventional Radiology department 24 hours a day.    For procedures that were done at [facility]:    8 AM - 4 PM Monday through Friday Contact the Nurse Line [phone]  For afterhours and weekends [phone] ([facility])  Ask for the Interventional Radiologist-on call.     For procedures that were done at [facility]:  8 AM - 4 PM Monday through Friday Contact   [phone] ([facility])  For afterhours and weekends: [phone]   Ask for the Interventional Radiologist on call.       IF DIRECTED by the RADIOLOGIST, related to specific problems with the tube functioning,  go to the Emergency Department.        Drain care      Gastrostomy (G) Tube Discharge Instructions:   You had a gastrostomy (stomach) tube also known as a G tube placed on 2/17/25. This tube is often used for nutritional support and medication administration or it can be used for stomach venting.     Care instructions:  - If you received sedation for your procedure, do not drive or operate heavy machinery for the rest of the day.  - Avoid soaking in stagnant water (tub baths, Jacuzzis, pools, lake, or ocean).    - You may shower beginning the day after the tube was placed.   - Clean under the disc daily with soap and water. Pat dry under disc and apply new split gauze dressing under disc. Cleaning tube site daily will help prevent infection and skin irritation.  - A small amount of clear tan drainage from the tube exit site can be normal.  - Make sure the disc on the tube fits slightly snug against the skin so that the tube does not move in or out of the body easily.  - Flush your tube with 60cc of water twice a day (using a cath tip syringe) to prevent tube from clogging (or follow recommendations from your doctor or dietician if given).    Giving Feedings and Medications:  - Follow-up with your dietician, primary care provider, or oncologist for instructions on tube feedings and medication administration.    - ONLY use specific enteric feedings with your G tube (unless otherwise discussed with your dietitian).    - Flush tube at least twice daily with 60ml of water using cath tip syringe unless otherwise instructed by your doctor or dietician.  - Flush the tube before and after administrating medications and bolus feedings with 60cc of water.    Follow  Up:  -Recommend routine exchanges of feeding tube at least once per year. Please contact your primary care provider or speak with your dietitian to obtain an order to have your G tube exchanged.   - G tubes CANNOT be removed until 6 weeks after date of tube placement (Tube placed 2/17/25).  - T-fasteners/Buttons (suture) should be removed 7-10 days after tube placement. This may have been done while you were still and inpatient, or can be completed in your outpatient clinic or care facility. Please contact [facility] for T fastener questions or concerns     Please seek medical evaluation for:  - Fever (greater than 101 F (38.3C)).  - Purulent (yellow/green/foul smelling) drainage from tube exit site.   - Significant or worsening abdominal pain.   - Skin that is hot to the touch or significantly reddened at the tube exit site.   - Bleeding at tube exit site.    Call the numbers below with questions or if you have any of the following symptoms:  - Tube falls out or felt to be out of position.  - Unable to flush tube.  - Significant leakage around tube site (tube feeding, medications or drainage).  - Significant bleeding at the tube exit site.  - Severe pain at tube exit site.    Please call Ethel IR RN clinician at  or Bertha IR NP at  for questions or concerns about the tube. You can leave a voicemail. We work Monday through Friday [phone]    An alternative number to call for questions is Interventional Radiology at         General info for SNF      Length of Stay Estimate: Short Term Care: Estimated # of Days <30  Condition at Discharge: Stable  Level of care:skilled   Rehabilitation Potential: Good  Admission H&P remains valid and up-to-date: Yes  Recent Chemotherapy: N/A  Use Nursing Home Standing Orders: Yes        Mantoux instructions      Give two-step Mantoux (PPD) Per Facility Policy Yes        Oxygen (SNF/TCU) Discharge      Tubes and Drains      Current Tubes and  Drains:     Drain  Duration           GI Enteral Access Device LUQ Gastrostomy 18 fr 9 days        Line  Duration           Hemodialysis Vascular Access AV fistula Superior Arm -- days    Hemodialysis Vascular Access Arteriovenous graft Left Forearm 1756 days                       Future tests that were ordered for you       EKG 12-lead complete w/read  (to be scheduled)                 Rehab orders for Skilled Facility (from Discharge Orders):      Referrals     Future Labs/Procedures    Primary Care - Care Coordination Referral     Process Instructions:    Services are provided by a Care Coordinator for people with complex needs   such as: medical, social, or financial troubles.  The Care Coordinator   works with the patient and their Primary Care Provider to determine health   goals, obtain resources, achieve outcomes, and develop care plans that   help coordinate the patient's care.     Comments:         Follow-Up with Cardiology [facility]     Scheduling Instructions:    Hospital follow-up    Comments:     [employer] [facility] will call you to coordinate your care as prescribed by   your provider. If you have concerns about scheduling, please call   [phone].      Nutrition Services Adult IP Consult     Comments:    Reason:  to continue and adjust tube feeds    Occupational Therapy Adult Consult     Comments:    Evaluate and treat as clinically indicated.    Reason: deconditioning    Physical Therapy Adult Consult     Comments:    Evaluate and treat as clinically indicated.    Reason: deconditioning    Speech Language Path Adult Consult     Comments:    Evaluate and treat as clinically indicated.    Reason: dysphagia             Discharge Time:      Greater than 30 minutes.        Image Results From This Hospital Stay (For Non-EPIC Providers):        Results for orders placed or performed during the hospital encounter of 01/08/25   XR Chest Port 1 View    Narrative    EXAM: XR CHEST PORT 1 VIEW  LOCATION: [address]  [facility]  DATE: 1/8/2025    INDICATION: SOB, cough x 3 days.  COMPARISON: 11/23/2021      Impression    IMPRESSION: Peripheral interstitial changes noted, question interstitial edema. No consolidation.   XR Chest Port 1 View    Narrative    EXAM: XR CHEST PORT 1 VIEW  LOCATION: [facility]  DATE: 1/9/2025    INDICATION: Endotracheal tube positioning.  COMPARISON: 1/8/2025      Impression    IMPRESSION: Endotracheal tube tip 3.3 cm from the tomasa. Scattered interstitial changes similar to previous. Enteric tube tip below the margin of the study.   XR Abdomen Port 1 View    Narrative    EXAM: XR ABDOMEN PORT 1 VIEW  LOCATION: [facility]  DATE: 1/9/2025    INDICATION: Feeding tube placement.  COMPARISON: None.      Impression    IMPRESSION:   Enteric tube tip in the body of the stomach.   US Lower Extremity Venous Duplex Bilateral    Narrative    EXAM: US LOWER EXTREMITY VENOUS DUPLEX BILATERAL  LOCATION: [facility]  DATE: 1/9/2025    INDICATION: Hypoxic, swelling  COMPARISON: None.  TECHNIQUE: Venous Duplex ultrasound of bilateral lower extremities with and without compression, augmentation and duplex. Color flow and spectral Doppler with waveform analysis performed.    FINDINGS: Exam includes the common femoral, femoral, popliteal veins as well as segmentally visualized deep calf veins and greater saphenous vein.     RIGHT: No deep vein thrombosis. No superficial thrombophlebitis. No popliteal cyst.    LEFT: No deep vein thrombosis. No superficial thrombophlebitis. No popliteal cyst.      Impression    IMPRESSION:  1.  No deep venous thrombosis in the bilateral lower extremities.   XR Chest Port 1 View    Narrative    EXAM: XR CHEST PORT 1 VIEW  LOCATION: [facility]  DATE: 1/14/2025    INDICATION: Respiratory failure, failing SBT trials.  COMPARISON: 1/9/2025.      Impression    IMPRESSION:  Endotracheal tube terminates 4 cm above the tomasa. An enteric catheter courses midline extending below the level of the diaphragm and beyond the field-of-view. There is persistent mild interstitial thickening/edema present. Minimal blunting   of the costophrenic angles is noted, suggestive of small volume pleural fluid and/or pleural thickening. No pneumothorax. Heart size is upper limits of normal. There is mild central vascular prominence. Moderate thoracic aortic atherosclerosis.   Hypertrophic degenerative changes of the spine. No acute osseous abnormality.   CT Chest Pulmonary Embolism w Contrast    Narrative    EXAM: CT CHEST PULMONARY EMBOLISM W CONTRAST  LOCATION: [facility]  DATE: 1/16/2025    INDICATION: Respiratory failure. Failing SBT trials.  COMPARISON: None.  TECHNIQUE: CT chest pulmonary angiogram during arterial phase injection of IV contrast. Multiplanar reformats and MIP reconstructions were performed. Dose reduction techniques were used.   CONTRAST: 78 mL Isovue 370    FINDINGS:  ANGIOGRAM CHEST: Pulmonary arteries are normal caliber and negative for pulmonary emboli. Thoracic aorta is negative for dissection. No CT evidence of right heart strain.    LUNGS AND PLEURA: Moderate-sized pleural effusions with bibasilar consolidation.    MEDIASTINUM/AXILLAE: Cardiac enlargement. Endotracheal tube tip 4.7 cm proximal to the tomasa. Nasogastric tube tip in the mid stomach.    CORONARY ARTERY CALCIFICATION: Mild.    UPPER ABDOMEN: Normal.    MUSCULOSKELETAL: Normal.      Impression    IMPRESSION:  1.  No pulmonary embolism.    2.  Cardiac enlargement with moderate-sized pleural effusions and bibasilar consolidation.    3.  Endotracheal and nasogastric tubes.   XR Chest Port 1 View    Narrative    EXAM: XR CHEST PORT 1 VIEW  LOCATION: [facility]  DATE: 1/20/2025    INDICATION: Increase work of breathing  COMPARISON: 01/14/2025       Impression     IMPRESSION: Endotracheal and enteric tubes have been removed. Remainder unchanged. Small bilateral pleural effusions with passive atelectasis in the lung bases. Calcified mitral annulus. Moderate degenerative change thoracic spine. Vascular stent left   axilla.    CT Head w/o Contrast    Narrative    EXAM: CT HEAD W/O CONTRAST  LOCATION: [facility]  DATE: 1/20/2025    INDICATION: encephalopathy, r o bleed (on Heparin)  COMPARISON: None.  TECHNIQUE: Routine CT Head without IV contrast. Multiplanar reformats. Dose reduction techniques were used.    FINDINGS:  INTRACRANIAL CONTENTS: No intracranial hemorrhage, extraaxial collection, or mass effect.  No CT evidence of acute infarct. Mild presumed chronic small vessel ischemic changes. Mild generalized volume loss. No hydrocephalus.     VISUALIZED ORBITS/SINUSES/MASTOIDS: Prior bilateral cataract surgery. Visualized portions of the orbits are otherwise unremarkable. There is mucosal thickening and air-fluid level within the right maxillary sinus. Scattered mucosal thickening of the   ethmoid air cells and sphenoid sinuses. Scattered fluid/membrane thickening in the right mastoid air cells. No apparent mass in the posterior nasopharynx or skull base.    BONES/SOFT TISSUES: No acute abnormality. There is atherosclerotic plaque of the bilateral carotid arteries and vertebral arteries. Right temporomandibular joint osteoarthritis.      Impression    IMPRESSION:  1.  No evidence of intracranial hemorrhage or other acute intracranial abnormality.  2.  Mild cerebral volume loss and presumed changes of chronic microvascular disease.  3.  Mucosal thickening and fluid level within the right maxillary sinus. Correlate for acute sinusitis.   CT Chest w/o Contrast    Narrative    EXAM: CT CHEST W/O CONTRAST  LOCATION: [facility]  DATE: 1/20/2025    INDICATION: r o new R plerual effusion  Acute resp failure, Inf A extubated 1  18.  COMPARISON: CT on 1/16/2024 and chest x-ray on 1/20/2025  TECHNIQUE: CT chest without IV contrast. Multiplanar reformats were obtained. Dose reduction techniques were used.  CONTRAST: None.    FINDINGS:   LUNGS AND PLEURA: Moderate right and small-to-moderate left pleural effusion and bibasilar compressive atelectasis, stable since 1/16/2025. Adjacent compressive atelectasis is slightly increased in the right lower lobe. Increased linear interlobular   septal thickening throughout the lungs, likely due to mild pulmonary edema. The central tracheobronchial tree is clear.    MEDIASTINUM/AXILLAE: Mildly enlarged mediastinal lymph nodes are stable. For example, a 12 mm subcarinal lymph node (4/67) is unchanged. The NG tube has been removed. Mild cardiomegaly. Mildly enlarged main pulmonary artery consistent with pulmonary   hypertension. No thoracic aortic aneurysm. No pericardial effusion.    CORONARY ARTERY CALCIFICATION: Moderate to severe    UPPER ABDOMEN: Pancreatic parenchymal atrophy.    MUSCULOSKELETAL: No suspicious lesions in the bones.      Impression    IMPRESSION:   1.  Stable moderate right and small-to-moderate left pleural effusion. Right lower lobe compressive atelectasis has slightly increased.  2.  Mild pulmonary edema is new/increased.  3.  Stable cardiomegaly. Stable enlarged main pulmonary artery consistent with pulmonary hypertension.     US Thoracentesis    Narrative    EXAM:   1. RIGHT  THORACENTESIS  2. ULTRASOUND GUIDANCE  LOCATION: [facility]  DATE: 1/20/2025    INDICATION: Pleural effusion.    PROCEDURE: Informed consent obtained by telephone from the patient's daughter, Caroline Gray. Time out performed. The chest was prepped and draped in sterile fashion. 10 mL of 1 % lidocaine was infused into the local soft tissues. Under direct ultrasound   guidance, a 5 Martiniquais catheter system was placed into the pleural effusion.     0.1  liters of gelatinous clear yellow   fluid were removed and sent to lab. No additional fluid could be removed.    Patient tolerated procedure well.    Ultrasound imaging was obtained and placed in the patient's permanent medical record.      Impression    IMPRESSION:  Status post right  ultrasound-guided thoracentesis.    Reference CPT Code: 26239   XR Abdomen Port 1 View    Narrative    EXAM: XR ABDOMEN PORT 1 VIEW  LOCATION: [facility]  DATE: 1/20/2025    INDICATION: NJ placement  COMPARISON: Abdominal radiograph 1/9/2025.      Impression    IMPRESSION:     Appropriately positioned feeding tube with tip in the expected location of the distal duodenum. There is a kink within the distal aspect of the tube near the tip of the internal stylette.    Nonobstructive bowel gas pattern. Large stool ball within the rectum.    Extensive atherosclerotic calcifications within the abdominal aorta and iliac arteries.    Severe degenerative changes of the right hip. Unchanged plate and screws within the right ilium and acetabulum.   XR Chest Port 1 View    Narrative    EXAM: XR CHEST PORT 1 VIEW  LOCATION: [facility]  DATE: 1/20/2025    INDICATION: ETT placement  COMPARISON: Earlier today at 8:15 AM      Impression    IMPRESSION: Endotracheal tube has been placed with tip 3.6 cm above the tomasa. There are 2 enteric tubes coursing below the left hemidiaphragm, tip outside the field-of-view. Increased small layering right pleural effusion. Stable small left pleural   effusion. Bibasilar atelectasis and mild pulmonary edema. No definite pneumothorax. Mild cardiomegaly. Left axillary vascular stent.   XR Chest Port 1 View    Narrative    EXAM: XR CHEST PORT 1 VIEW  LOCATION: [facility]  DATE: 1/20/2025    INDICATION: CVC placement  COMPARISON: 2:11 PM on 01/20/2025       Impression    IMPRESSION:  Right IJ central line has been placed with tip at the SVC/RA junction. Remainder unchanged.  Endotracheal tube tip 2 cm above the tomasa. Feeding tube tip below diaphragm, off the image. Small bilateral pleural effusions with passive   atelectasis in the lung bases. Calcified aortic atherosclerosis. Calcified mitral annulus. Mild degenerative change thoracic spine and right acromioclavicular joint. Vascular stent left axilla.    XR Chest Port 1 View    Narrative    EXAM: XR CHEST PORT 1 VIEW  LOCATION: [facility]  DATE: 1/21/2025    INDICATION: Eval infiltrates.  COMPARISON: 01/20/2025      Impression    IMPRESSION: Endotracheal tube in good position above the tomasa. Feeding tube courses below the diaphragm. Right IJ central venous catheter tip is near the superior atriocaval junction. There are small bilateral pleural effusions. Underlying atelectasis   or consolidation in both lung bases. Upper lungs remain clear. Overall, no significant change from previous.   XR Chest Port 1 View    Narrative    EXAM: XR CHEST PORT 1 VIEW  LOCATION: [facility]  DATE: 1/23/2025    INDICATION: Hypoxic respiratory failure  COMPARISON: 1/21/2025      Impression    IMPRESSION: The endotracheal tube tip is 3.4 cm above the tomasa. Right IJ central venous catheter tip is in the high right atrium. Feeding tube coursing into the stomach and tip outside the field-of-view. A new patchy opacity in the right midlung may be   due to pneumonia or pulmonary edema. Stable mild pulmonary edema, small bilateral pleural effusions and bibasilar atelectasis. No pneumothorax. Mild cardiomegaly.   CT Chest w/o Contrast    Narrative    EXAM: CT CHEST W/O CONTRAST  LOCATION: [facility]  DATE: 1/23/2025    INDICATION: worsening infiltrates in x ray, BAL with actinomyces, r o actinomycosis  COMPARISON: 1/20/2025  TECHNIQUE: CT chest without IV contrast. Multiplanar reformats were obtained. Dose reduction techniques were used.  CONTRAST: None.    FINDINGS:   LUNGS AND  PLEURA: Small right effusion with atelectasis. Additional area of consolidation noted within the right lower lobe may represent atelectasis and/or pneumonia. Small consolidation at the left lung base with adjacent volume loss. Slight   interstitial edema, similar to prior examination.    MEDIASTINUM/AXILLAE: Endotracheal tube above the tomasa. Heart is enlarged, unchanged. No mediastinal, axillary, or hilar adenopathy. Pulmonary arteries enlarged suggesting pulmonary arterial hypertension.    CORONARY ARTERY CALCIFICATION: Severe.    UPPER ABDOMEN: Normal.    MUSCULOSKELETAL: Degenerative changes of the spine.      Impression    IMPRESSION:   1.  Small bilateral effusions with bibasilar atelectasis. Additional areas of consolidation noted at the right lung base which probably represents atelectasis although pneumonia is not excluded. Overall, the left effusion is similar to prior examination   and slightly decreased right effusion. No new airspace opacities.     XR Video Swallow with SLP or OT    Narrative    EXAM: XR VIDEO SWALLOW WITH SLP OR OT  LOCATION: [facility]  DATE: 1/31/2025    INDICATION: Difficulty swallowing.  COMPARISON: None.    TECHNIQUE: Routine swallow study with speech pathology using multiple barium thicknesses.    RADIATION DOSE: Total Air Kerma 1 mGy      Impression    IMPRESSION: No penetration or aspiration with any consistency. Please see speech pathology note for further details.   XR Chest Port 1 View    Narrative    EXAM: XR CHEST PORT 1 VIEW  LOCATION: [facility]  DATE: 2/1/2025    INDICATION: worsening shortness of breath and hypoxia; recent influenza A infection, history of COPD and CHF; afebrile, WBC normal  COMPARISON: 1/23/2025      Impression    IMPRESSION: Heart size and vascularity are within normal limits for technique. An endotracheal tube is removed. Right IJ line tip in the SVC. Enteric tube tip below the diaphragm and lower  margin of the film. Patchy infiltrates in the right upper lobe   have developed and are likely infectious or inflammatory. Right lower lung infiltrates have improved.   CT Chest/Abdomen/Pelvis w Contrast    Narrative    EXAM: CT CHEST/ABDOMEN/PELVIS W CONTRAST  LOCATION: [facility]  DATE: 2/2/2025    INDICATION: abdo pain, unclear etiology, worsening resp failure  COMPARISON: CT chest dated 120  TECHNIQUE: CT scan of the chest, abdomen, and pelvis was performed following injection of IV contrast. Multiplanar reformats were obtained. Dose reduction techniques were used.   CONTRAST: 114 mL Isovue 370  LIMITATIONS: Arm down positioning with streak artifact; motion artifact    FINDINGS:   LUNGS AND PLEURA: Moderate bilateral pleural effusions, increased in the interval. Increasing associated compressive atelectasis. New heterogeneous airspace disease in the right upper lobe compatible with pneumonia. No pneumothorax. Retained secretions   in the trachea.    MEDIASTINUM/AXILLAE: Stable cardiomegaly. Stable prominent mediastinal nodes, potentially reactive in the setting of infection. Right-sided central venous catheter terminates at the cavoatrial junction.    CORONARY ARTERY CALCIFICATION: Severe.    HEPATOBILIARY: Gallbladder is thick-walled but decompressed. No intrahepatic biliary ductal dilatation.    PANCREAS: Unremarkable    SPLEEN: Unremarkable    ADRENAL GLANDS: Unremarkable    KIDNEYS/BLADDER: Bilateral renal cortical thinning without hydronephrosis. Relative right renal atrophy. Bladder is normal in contour.    BOWEL: Large amount of retained contrast in the colon. Feeding tube terminates in the proximal jejunum. No evidence of small bowel obstruction. Relatively large fecal burden in the rectosigmoid.    LYMPH NODES: Multiple mildly enlarged left periaortic nodes reaching 10 mm.    VASCULATURE: Extensive vascular calcification without abdominal aortic aneurysm.    PELVIC ORGANS: No  free fluid    MUSCULOSKELETAL: Postoperative changes in the right pelvis. Advanced degenerative changes in the right hip. Generalized osteopenia. Multilevel spondylosis.      Impression    IMPRESSION:  1.  New right upper lobe airspace disease compatible with pneumonia. Increasing bilateral pleural effusions.  2.  Large fecal burden in the rectosigmoid.  3.  Nonspecific mediastinal and retroperitoneal adenopathy.  4.  Advanced degenerative changes in the right hip.   XR Chest Port 1 View    Narrative    EXAM: XR CHEST PORT 1 VIEW  LOCATION: [facility]  DATE: 2/5/2025    INDICATION: Shortness of breath.  COMPARISON: 2/1/2025.      Impression    IMPRESSION: Increased mild hazy opacities that may be mildly increased edema. Atypical pneumonia remains a possibility particularly at the right upper lung. Stable opacity at the left lung base that may be ongoing airspace disease or atelectasis. Stable   cardiomegaly. Stable right neck central venous line tip at the right atrium. If relevant, this could be retracted by approximately 3.3 cm for placement at the cavoatrial level. Enteric feeding tube is again noted. Likely small basilar pleural fluid   appears stable.   XR Chest Port 1 View    Narrative    EXAM: XR CHEST PORT 1 VIEW  LOCATION: [facility]  DATE: 2/6/2025    INDICATION: Shortness of breath  COMPARISON: 2/5/2025.      Impression    IMPRESSION: Stable cardiac silhouette with mitral annulus calcification. Right IJ approach vascular catheter with distal tip terminating at the right atrium. Feeding tube courses below the diaphragm with distal tip beyond the inferior field of view.   Pulmonary vascular congestion with bilateral interstitial prominence appears worsened and may represent interstitial edema or atypical infection. Increasing dense consolidation of the left lung base which may represent atelectasis or pneumonia. Trace   right and small left pleural  effusions are mildly increased. No discernible pneumothorax. Left axillary vascular graft.   XR Chest Port 1 View    Narrative    EXAM: XR CHEST PORT 1 VIEW  LOCATION: [facility]  DATE: 2/8/2025    INDICATION: monitor vascular congestion and opacities in right upper lobe and left lower lobe, now that fluid has been removed with hemodialysis  COMPARISON: 11/23/2021      Impression    IMPRESSION: Right IJ catheter tip terminates in the right atrium. Transesophageal tube tip is below the field of view. Cardiac silhouette and mediastinal contours are enlarged. Hazy interstitial opacities bilaterally consistent with mild edema. Probable   small left pleural effusion. No pneumothorax. Vascular stent is seen in the left axilla.   CT Head w/o Contrast    Narrative    EXAM: CT HEAD W/O CONTRAST  LOCATION: [facility]  DATE: 2/10/2025    INDICATION: ams  COMPARISON: None.  TECHNIQUE: Routine CT Head without IV contrast. Multiplanar reformats. Dose reduction techniques were used.    FINDINGS:  INTRACRANIAL CONTENTS: No evidence of acute intracranial hemorrhage or mass effect. Scattered foci of decreased attenuation within the cerebral hemispheric white matter which are nonspecific, though most commonly ascribed to chronic small vessel ischemic   disease. The ventricles and sulci are normal for age. Normal gray-white matter differentiation. The basilar cisterns are patent.    VISUALIZED ORBITS/SINUSES/MASTOIDS: The globes are unremarkable. The partially imaged paranasal sinuses, mastoid air cells and middle ear cavities are unremarkable.     BONES/SOFT TISSUES: The visualized skull base and calvarium are unremarkable.      Impression    IMPRESSION:    1.  No evidence of acute intracranial hemorrhage or mass effect.   IR Gastrostomy Tube Percutaneous Plcmnt    Narrative    [facility]    EXAM: PERCUTANEOUS GASTROSTOMY TUBE PLACEMENT    LOCATION: [facility]  [place]    CLINICAL HISTORY: The patient has a history of poor oral intake and needs long term enteral nutrition.    PROCEDURES PERFORMED:  2. Insufflation of the stomach with air via the nasogastric tube.  3. Percutaneous puncture of body of the stomach under fluoroscopic guidance, and placement of T-tacks.  4. Percutaneous puncture of stomach with 18 gauge needle and advancement of a guidewire through needle and into stomach.  5. Sequential dilation of tract and placement of peel-away sheath into the stomach.  6. Placement of 18 Yemeni CARLOS gastrostomy tube through the peel-away sheath and into stomach.    MODERATE SEDATION: 2 mg Versed and 100 mcg Fentanyl were administered intravenously for moderate sedation. Pulse oximetry, heart rate and blood pressure were continuously monitored by an independent trained observer. The physician spent 9 minutes of   face-to-face moderate sedation time with the patient.    ADDITIONAL MEDICATIONS: see EMR    CONTRAST: see EMR    FLUOROSCOPIC TIME: 1.6 minutes  CUMULATIVE AIR KERMA/DOSE: 11.23 mGy    STERILE BARRIER TECHNIQUE: Maximal Sterile Barrier Technique Utilized: Cap AND mask AND sterile gown AND sterile gloves AND sterile full body drape AND hand hygiene AND skin preparation 2% chlorhexidine for cutaneous antisepsis (or acceptable alternative   antiseptics).   Sterile Ultrasound Technique Utilized ?Sterile gel AND sterile probe covers.    UNIVERSAL PROTOCOL: Standard universal protocol per facility guidelines was followed. See EMR for documentation.     TECHNIQUE: Risks, benefits and alternatives were explained to the patient and written, informed consent was obtained. The patient was placed in the supine position on the angiography table. A 5 Yemeni catheter was then placed transnasally into the   stomach, under fluoroscopic guidance. The stomach was insufflated with air. The abdomen was prepped and draped in the usual fashion and anesthetized with 1% lidocaine. The body of  the stomach was then punctured under fluoroscopic guidance and 2 T-tacks   were deposited within the stomach, and used to place tension on the anterior gastric wall. An 18 gauge needle was then used access the stomach between the T-tacks.  A stiff guidewire was advanced through the needle and looped within the stomach. The   tract was then sequentially dilated and a 22 Greek peel away sheath placed. A 18 Greek CARLOS gastrostomy tube was then advanced over the guidewire and into the stomach. The balloon was inflated with 8 mL of dilute contrast and pulled against the anterior   gastric wall. The T-tacks were then sutured to the skin. Contrast was injected through the gastrostomy tube to document its position. The nasogastric catheter was then removed. The patient tolerated the procedure well and there were no immediate   complications.    FINDINGS: Initial fluoroscopic inspection of the abdomen shows that the colon is caudal to the insufflated stomach. Images obtained following placement of the gastrostomy tube, show that the tube is within the stomach and that the balloon is adequately   inflated.      Impression    IMPRESSION:  1. Fluoroscopic guided placement of 18 Greek CARLOS gastrostomy tube.        CPT Codes:                              XR Chest Port 1 View    Narrative    EXAM: XR CHEST PORT 1 VIEW  LOCATION: [facility]  DATE: 2/18/2025    INDICATION: chest pain  COMPARISON: 02/08/2025       Impression    IMPRESSION: Feeding tube and right IJ central line have been removed. Remainder unchanged. Shallow inspiration with atelectasis in the left base. Calcified mitral annulus. Calcified aortic atherosclerosis. Vascular stent left axilla. Mild degenerative   change in the spine and shoulders.    Echocardiogram Complete     Value    LVEF  60%    Narrative    [id number]  [id number]  [id number]  [id number]^[id number]^AGATHA^ABIMAEL     [facility]  Echocardiography Laboratory  [address] [place] [address]  [place] [address]     Name: BROOKE ARANA  MRN: [id number]  : 1950  Study Date: 2025 01:57 PM  Age: 75 yrs  Gender: Female  Patient Location: [facility]  Reason For Study: Murmur  Ordering Physician: AGATHA LANDRY  Referring Physician: Noam Jackson  Performed By: Amilcar Rosas RDCS     BSA: 2.1 m2  Height: 67 in  Weight: 211 lb  HR: 130  BP: 166/98 mmHg  ______________________________________________________________________________  Procedure  Echocardiogram with two-dimensional, color and spectral Doppler. Definity (NDC  #58820-480) given intravenously. Technically difficult study.  ______________________________________________________________________________  Interpretation Summary     1. Normal left ventricular size and systolic function. Estimated ejection  fraction is 60%.  2. Normal right ventricular size and systolic function.  3. No significant valve disease.  4. No pericardial effusion.  5. No significant change compared to prior exam from 2021.  ______________________________________________________________________________  Left Ventricle  The left ventricle is normal in size. Grossly normal left ventricular wall  thickness. Left ventricular diastolic function is indeterminate. The visual  ejection fraction is estimated at 60%. No regional wall motion abnormalities  noted.     Right Ventricle  The right ventricle is normal in size and function.     Atria  Left atrial enlargement. Right atrial enlargement. There is no color Doppler  evidence of an atrial shunt.     Mitral Valve  There is mild mitral annular calcification. The mitral valve leaflets are  mildly thickened. There is trace mitral regurgitation.     Tricuspid Valve  The tricuspid valve is not well visualized. There is trace tricuspid  regurgitation. Right ventricular systolic pressure could not be approximated  due to inadequate tricuspid regurgitation.     Aortic Valve  The aortic valve is  trileaflet with aortic valve sclerosis. No aortic  regurgitation is present. No aortic stenosis is present.     Pulmonic Valve  The pulmonic valve is not well visualized. There is no pulmonic valvular  regurgitation. There is no pulmonic valvular stenosis.     Vessels  Normal size ascending aorta. Mildly dilated inferior vena cava with decreased  inspiratory collapse.     Pericardium  There is no pericardial effusion.     Rhythm  The rhythm was atrial fibrillation.  ______________________________________________________________________________  MMode/2D Measurements & Calculations  asc Aorta Diam: 3.7 cm     Asc Ao diam index BSA (cm/m2): 1.8  Asc Ao diam index Ht(cm/m): 2.2     Doppler Measurements & Calculations  MV E max prabhakar: 118.4 cm/sec  Ao V2 max: 136.7 cm/sec  Ao max P.5 mmHg  PA acc time: 0.10 sec  E/E' av.3  Lateral E/e': 10.1  Medial E/e': 16.5  RV S Prabhakar: 16.1 cm/sec     ______________________________________________________________________________  Report approved by: Adonis Valdovinos MD on 2025 02:43 PM           *Note: Due to a large number of results and/or encounters for the requested time period, some results have not been displayed. A complete set of results can be found in Results Review.           Most Recent Lab Results In EPIC (For Non-EPIC Providers):    Most Recent 3 CBC's:  Recent Labs   Lab Test 25  0844 25  0544 25  0452 25  0535   WBC  --  5.8 5.4 6.7   HGB 7.2* 7.0* 7.3* 7.6*   MCV  --  100 100 100   PLT  --  201 222 245      Most Recent 3 BMP's:  Recent Labs   Lab Test 25  1226 25  0732 25  0602 25  0544 25  0653 25  0452 25  0601 25  0535   NA  --   --   --  135  --  134*  --  138   POTASSIUM  --   --   --  4.4  --  3.8  --  3.7   CHLORIDE  --   --   --  95*  --  94*  --  97*   CO2  --   --   --  27  --  29  --  30*   BUN  --   --   --  80.6*  --  66.9*  --  43.6*   CR  --   --   --  4.54*  --  3.96*  --  " 2.83*   ANIONGAP  --   --   --  13  --  11  --  11   JODY  --   --   --  9.5  --  10.1  --  9.6   * 200* 209* 237*   < > 198*   < > 192*    < > = values in this interval not displayed.     Most Recent 3 Troponin's:No lab results found.    Invalid input(s): \"TROP\", \"TROPONINIES\"  Most Recent 3 INR's:  Recent Labs   Lab Test 02/14/25  1421 04/24/24  0913 04/16/21  0800   INR 1.57* 1.11 1.14     Most Recent 2 LFT's:  Recent Labs   Lab Test 02/05/25 2004 02/02/25  0542   AST 17 12   ALT 24 22   ALKPHOS 75 55   BILITOTAL 0.3 0.2     Most Recent Cholesterol Panel:  Recent Labs   Lab Test 04/21/23  1405   CHOL 113   LDL 39   HDL 50   TRIG 122     Most Recent 6 Bacteria Isolates From Any Culture (See EPIC Reports for Culture Details):  Recent Labs   Lab Test 03/19/21  0900 06/24/18  1100 06/23/18  2348 06/23/18  2347 06/18/18  0949 05/19/18  0119   CULT Light growth  Normal skin shree    Light growth  Streptococcus agalactiae sero group B  This organism is susceptible to ampicillin, penicillin, vancomycin and the cephalosporins.   If treatment is required AND your patient is allergic to penicillin, contact the   Microbiology Lab within 5 days to request susceptibility testing.  *  Moderate growth  Candida albicans  Susceptibility testing not routinely done  * Moderate growth  Corynebacterium striatum  Identification obtained by MALDI-TOF mass spectrometry research use only database. Test   characteristics determined and verified by the Infectious Diseases Diagnostic Laboratory   ([facility]) [place], [place].  *  Light growth  Staphylococcus aureus  *  Single colony  Beta hemolytic Streptococcus group B  *  Light growth  Gram positive bacilli resembling diphtheroids  No further identification  Susceptibility testing not routinely done  *  Susceptibility testing requested by  Dr. Chen for routine sensitivities on the Corynebacterium striatum on 6.26.18. ME.    Moderate growth  Staphylococcus aureus  Susceptibility " testing done on previous specimen  *  Moderate growth  Corynebacterium striatum  Identification obtained by MALDI-TOF mass spectrometry research use only database. Test   characteristics determined and verified by the Infectious Diseases Diagnostic Laboratory   ([facility]) [place], [place].  Susceptibility testing not routinely done  *  Incorrectly ordered by PCU/Clinic  Canceled, Test credited  See Accesion number [id number] No growth No growth Moderate growth  Beta hemolytic Streptococcus group C  *  Moderate growth  Staphylococcus aureus  *  Light growth  Normal skin shree   No growth     Most Recent TSH, T4 and HgbA1c:  Recent Labs   Lab Test 02/05/25  1736 01/17/25 2039   TSH 5.67*  --    T4 1.32  --    A1C  --  6.5*

## 2025-02-26 NOTE — PROGRESS NOTES
Patient seen and examined in dialysis    Per  TCU bed available for today    - last night went into a fib RVR again, was given a dose of IV metoprolol 2.5 mg and subsequently converted into sinus rhythm  - EP consulted again given recurrence of a fib prior to discharge; EP suggested to continue amiodarone BID until follow up with EP clinic on 3/14/25 and ordered for Zio patch at discharge; EP d/mahsa 62.5 mg digoxin    Discharge to TCU today after dialysis    Please see discharge summary for details

## 2025-02-26 NOTE — PROGRESS NOTES
Assessment and Plan:     ESRD: dialysis today. Hypotensive on run. Will decrease goal to 1.5 L UF. No heparin.   1.5 l UF, LAF with 400 BFR, 3 h, K protocol, 35 HCO3 and 140 Na.     Cont to run MWF.             Interval History:   Hypertension: she is hypotensive on dialysis. She does get midodrine pre-run.     DM:           Anemia: on EPO.                Review of Systems:   C/O dizziness with UF. Better now.           Medications:     Current Facility-Administered Medications   Medication Dose Route Frequency Provider Last Rate Last Admin    - MEDICATION INSTRUCTIONS for Dialysis Patients -   Does not apply See Admin Instructions Akosua Garcia, [facility]        amiodarone (PACERONE) tablet 200 mg  200 mg Oral BID Carly Faye DO   200 mg at 02/26/25 0744    amLODIPine (NORVASC) tablet 10 mg  10 mg Oral BID Braden Oneill MD   10 mg at 02/25/25 1008    apixaban ANTICOAGULANT (ELIQUIS) tablet 5 mg  5 mg Oral or Feeding Tube BID Don Trammell MD   5 mg at 02/26/25 0743    atorvastatin (LIPITOR) tablet 20 mg  20 mg Oral or Feeding Tube QPM Denver Shipman MD   20 mg at 02/25/25 2152    B and C vitamin Complex with folic acid (NEPHRONEX) liquid 5 mL  5 mL Per Feeding Tube Daily Denver Shipman MD   5 mL at 02/25/25 1007    budesonide (PULMICORT) neb solution 1 mg  1 mg Nebulization BID Denver Shipman MD   1 mg at 02/24/25 0718    calcitRIOL (ROCALTROL) solution 0.5 mcg  0.5 mcg Oral or G tube Once per day on Monday Wednesday Friday Vivian Russell MD   0.5 mcg at 02/26/25 0745    cetirizine (zyrTEC) tablet 5 mg  5 mg Oral or Feeding Tube At Bedtime Denver Shipman MD   5 mg at 02/25/25 2152    cinacalcet (SENSIPAR) tablet 30 mg  30 mg Oral Once per day on Monday Wednesday Friday Chandu Rodriguez MD   30 mg at 02/26/25 0748    digoxin (LANOXIN) half-tab 62.5 mcg  62.5 mcg Oral Once per day on Monday Wednesday Friday Chandu Rodriguez MD   62.5 mcg at 02/24/25  1527    furosemide (LASIX) tablet 80 mg  80 mg Oral or Feeding Tube Daily Vivian Russell MD   80 mg at 02/25/25 1007    hydrALAZINE (APRESOLINE) tablet 75 mg  75 mg Oral Q8H Counts include 234 beds at the [facility] Carly Faye,    75 mg at 02/25/25 2152    insulin aspart (NovoLOG) injection (RAPID ACTING)  1-7 Units Subcutaneous Q4H Carly Faye DO   2 Units at 02/26/25 0605    insulin glargine (LANTUS PEN) injection 15 Units  15 Units Subcutaneous BID Vivian Russell MD   15 Units at 02/26/25 0147    ipratropium (ATROVENT) 0.02 % neb solution 0.5 mg  0.5 mg Nebulization 4x daily Johanne Crowell MD   0.5 mg at 02/26/25 0734    levalbuterol (XOPENEX) neb solution 1.25 mg  1.25 mg Nebulization 4x daily Johanne Crowell MD   1.25 mg at 02/26/25 0734    miconazole (MICATIN) 2 % powder   Topical BID Denver Shipman MD   Given at 02/26/25 0742    midodrine (PROAMATINE) tablet 5 mg  5 mg Oral Once per day on Monday Wednesday Friday Vivian Russell MD   5 mg at 02/26/25 0742    pantoprazole (PROTONIX) 2 mg/mL suspension 40 mg  40 mg Per Feeding Tube QAM Denver Mays MD   40 mg at 02/26/25 0742    Or    pantoprazole (PROTONIX) IV push injection 40 mg  40 mg Intravenous QA Denver Mays MD   40 mg at 02/24/25 0635    polyethylene glycol (MIRALAX) Packet 17 g  17 g Oral or Feeding Tube Daily Liset Romero MD   17 g at 02/23/25 0842    Prosource TF20 ENfit Compatibl EN LIQD (PROSOURCE TF20) packet 60 mL  1 packet Per Feeding Tube Daily Truman Moreland MD   60 mL at 02/25/25 1630    QUEtiapine (SEROquel) tablet 50 mg  50 mg Oral or Feeding Tube At Bedtime Cheo Watt MD   50 mg at 02/25/25 2151    sennosides (SENOKOT) syrup 10 mL  10 mL Oral or Feeding Tube BID Liset Romero MD   10 mL at 02/26/25 0743    sertraline (ZOLOFT) tablet 75 mg  75 mg Oral or Feeding Tube QPM Denver Shipman MD   75 mg at 02/25/25 2208    sodium chloride (PF) 0.9% PF flush 3 mL  3 mL Intracatheter Q8H  Celine Plata, APRELIE CNP   3 mL at 02/26/25 0605    sodium chloride 0.9% BOLUS 500 mL  500 mL Hemodialysis Machine Once Manuel Doe MD        tacrolimus (PROTOPIC) 0.1 % ointment   Topical BID Denver Shipman MD   Given at 02/26/25 0742    vitamin D3 (CHOLECALCIFEROL) tablet 50 mcg  50 mcg Oral or Feeding Tube Daily Denver Shipman MD   50 mcg at 02/26/25 0744     Current Facility-Administered Medications   Medication Dose Route Frequency Provider Last Rate Last Admin    dextrose 10% infusion   Intravenous Continuous PRN Denver Shipman MD        heparin 10,000 units/10 mL infusion (DIALYSIS USE)  500 Units/hr Hemodialysis Machine Continuous Braden Oneill MD 0.5 mL/hr at 02/24/25 1131 500 Units/hr at 02/24/25 1131    heparin 10,000 units/10 mL infusion (DIALYSIS USE)  500 Units/hr Hemodialysis Machine Continuous Braden Oneill MD 0.5 mL/hr at 02/21/25 0956 500 Units/hr at 02/21/25 0956    No lozenges or gum should be given while patient on BIPAP/AVAPS/AVAPS AE   Does not apply Continuous PRN Denver Shipman MD        Patient may continue current oral medications   Does not apply Continuous PRN Denver Shipman MD        sodium chloride 0.9 % infusion   Intravenous Continuous Denver Shipman MD 10 mL/hr at 02/05/25 1831 10 mL/hr at 02/05/25 1831    Stop Heparin 60 minutes before end of treatment   Does not apply Continuous PRN Manuel Doe MD        Stop Heparin 60 minutes before end of treatment   Does not apply Continuous PRN Braden Oneill MD        Stop Heparin 60 minutes before end of treatment   Does not apply Continuous PRN Braden Oneill MD         Current active medications and PTA medications reviewed, see medication list for details.            Physical Exam:   Vitals were reviewed  Patient Vitals for the past 24 hrs:   BP Temp Temp src Pulse Resp SpO2 Weight   02/26/25 0945 99/65 -- -- 68 22 -- --   02/26/25 0930 111/54 -- -- 69 (!) 39 -- --   02/26/25  0915 111/55 -- -- 81 23 -- --   25 0906 134/47 -- -- 73 21 96 % --   25 0900 (!) 121/99 -- -- 72 21 95 % --   25 0845 135/46 -- -- 76 21 -- --   25 0843 123/51 -- -- 75 21 97 % --   25 0840 126/44 98.1  F (36.7  C) -- 75 17 95 % --   25 0816 -- -- -- -- -- 96 % --   25 0734 (!) 131/36 -- -- 75 -- 99 % 92.5 kg (203 lb 14.8 oz)   25 0729 (!) 131/36 98.2  F (36.8  C) Oral -- 18 -- --   25 0705 -- -- -- 73 -- 100 % --   25 0400 (!) 152/44 -- -- 66 -- 100 % --   25 0000 (!) 146/40 -- -- 76 -- (!) 89 % --   25 2152 116/83 -- -- -- -- -- --   25 (!) 126/38 -- -- 82 -- 98 % --   25 1930 (!) 141/41 -- -- 81 -- 98 % --   25 1900 128/86 -- -- 92 -- 95 % --   25 1845 113/60 -- -- (!) 128 -- 95 % --   25 1830 111/44 -- -- 111 -- 96 % --   25 1811 120/66 -- -- (!) 122 -- -- --   25 1800 120/66 -- -- -- -- 95 % --   25 1745 -- -- -- -- -- 93 % --   25 1730 -- -- -- -- -- 94 % --   25 1715 -- -- -- -- -- 92 % --   25 1700 -- -- -- -- -- (!) 83 % --   25 1645 -- -- -- -- -- (!) 84 % --   25 1630 -- -- -- -- -- 92 % --   25 1615 -- -- -- -- -- (!) 88 % --   25 1600 -- -- -- -- -- (!) 83 % --   25 1500 123/61 98  F (36.7  C) Oral 72 18 -- --   25 1451 -- -- -- -- -- 100 % --   25 1445 132/58 -- -- -- -- (!) 91 % --   25 1100 (!) 140/49 98  F (36.7  C) Oral 82 18 92 % --   25 1005 -- -- -- -- -- 95 % --       Temp:  [98  F (36.7  C)-98.2  F (36.8  C)] 98.1  F (36.7  C)  Pulse:  [] 68  Resp:  [17-39] 22  BP: ()/(36-99) 99/65  SpO2:  [83 %-100 %] 96 %    Temperatures:  Current - Temp: 98.1  F (36.7  C); Max - Temp  Av.1  F (36.7  C)  Min: 98  F (36.7  C)  Max: 98.2  F (36.8  C)  Respiration range: Resp  Av.7  Min: 17  Max: 39  Pulse range: Pulse  Av.5  Min: 66  Max: 128  Blood pressure range: Systolic (24hrs), Av  , Min:99 , Max:152   ; Diastolic (24hrs), Av, Min:36, Max:99    Pulse oximetry range: SpO2  Av.7 %  Min: 83 %  Max: 100 %    I/O last 3 completed shifts:  In: 1400 [NG/GT:510]  Out: 0       Intake/Output Summary (Last 24 hours) at 2025 0954  Last data filed at 2025 0800  Gross per 24 hour   Intake 1400 ml   Output 0 ml   Net 1400 ml       Alert and responsive, much brighter than previously.   Lungs clear ant BS  Cor afib, irreg, nl S1 S2 no M  LE RLE no edema, warm but no pusle, L AKA  LAF with needles in place.        Wt Readings from Last 4 Encounters:   25 92.5 kg (203 lb 14.8 oz)   24 101.1 kg (222 lb 14.2 oz)   24 101.1 kg (222 lb 14.2 oz)   24 101.1 kg (222 lb 14.2 oz)          Data:          Lab Results   Component Value Date     2025     2025     2025     2021     2021     2020    Lab Results   Component Value Date    CHLORIDE 95 2025    CHLORIDE 94 2025    CHLORIDE 97 2025    CHLORIDE 106 2021    CHLORIDE 109 2021    CHLORIDE 110 2021    CHLORIDE 105 2021    CHLORIDE 101 2021    CHLORIDE 106 2020    Lab Results   Component Value Date    BUN 80.6 2025    BUN 66.9 2025    BUN 43.6 2025    BUN 37 2021    BUN 69 2021    BUN 64 2021    BUN 68 2021    BUN 53 2021    BUN 44 2020      Lab Results   Component Value Date    POTASSIUM 4.4 2025    POTASSIUM 3.8 2025    POTASSIUM 3.7 2025    POTASSIUM 4.7 2022    POTASSIUM 3.8 2021    POTASSIUM 6.6 2021    POTASSIUM 4.2 2021    POTASSIUM 3.8 2021    POTASSIUM 3.9 2021    Lab Results   Component Value Date    CO2 27 2025    CO2 29 2025    CO2 30 2025    CO2 31 2021    CO2 23 2021    CO2 24 2021    CO2 23 2021    CO2 22 2021    CO2 29 2020    Lab  Results   Component Value Date    CR 4.54 02/26/2025    CR 3.96 02/23/2025    CR 2.83 02/22/2025    CR 5.98 04/17/2021    CR 4.35 04/16/2021    CR 5.28 04/09/2021        Recent Labs   Lab Test 02/26/25  0844 02/26/25  0544 02/23/25  0452 02/22/25  0535   WBC  --  5.8 5.4 6.7   HGB 7.2* 7.0* 7.3* 7.6*   HCT  --  23.2* 24.0* 24.7*   MCV  --  100 100 100   PLT  --  201 222 245     Recent Labs   Lab Test 02/05/25 2004 02/02/25  0542 01/26/25  1135   AST 17 12 21   ALT 24 22 61*   ALKPHOS 75 55 49   BILITOTAL 0.3 0.2 0.2       Recent Labs   Lab Test 02/17/25  0544 02/16/25  0529 02/15/25  0842   MAG 2.2 2.1 2.2     Recent Labs   Lab Test 02/26/25  0544 02/17/25  0544 02/12/25  0523   PHOS 3.6 3.4 4.8*     Recent Labs   Lab Test 02/26/25  0544 02/23/25  0452 02/22/25  0535   JODY 9.5 10.1 9.6       Lab Results   Component Value Date    JODY 9.5 02/26/2025     Lab Results   Component Value Date    WBC 5.8 02/26/2025    HGB 7.2 (L) 02/26/2025    HCT 23.2 (L) 02/26/2025     02/26/2025     02/26/2025     Lab Results   Component Value Date     02/26/2025    POTASSIUM 4.4 02/26/2025    CHLORIDE 95 (L) 02/26/2025    CO2 27 02/26/2025     (H) 02/26/2025     Lab Results   Component Value Date    BUN 80.6 (H) 02/26/2025    CR 4.54 (H) 02/26/2025     Lab Results   Component Value Date    MAG 2.2 02/17/2025     Lab Results   Component Value Date    PHOS 3.6 02/26/2025       Creatinine   Date Value Ref Range Status   02/26/2025 4.54 (H) 0.51 - 0.95 mg/dL Final   02/23/2025 3.96 (H) 0.51 - 0.95 mg/dL Final   02/22/2025 2.83 (H) 0.51 - 0.95 mg/dL Final   02/21/2025 4.02 (H) 0.51 - 0.95 mg/dL Final   02/19/2025 4.36 (H) 0.51 - 0.95 mg/dL Final   02/18/2025 3.14 (H) 0.51 - 0.95 mg/dL Final   04/17/2021 5.98 (H) 0.52 - 1.04 mg/dL Final   04/16/2021 4.35 (H) 0.52 - 1.04 mg/dL Final   04/09/2021 5.28 (H) 0.52 - 1.04 mg/dL Final   11/18/2020 4.23 (H) 0.52 - 1.04 mg/dL Final   11/16/2020 5.08 (H) 0.52 - 1.04 mg/dL  Final   09/25/2020 6.87 (H) 0.52 - 1.04 mg/dL Final       Attestation:  I have reviewed today's vital signs, notes, medications, labs and imaging.  Seen on dialysis.      Braden Oneill MD

## 2025-02-26 NOTE — PLAN OF CARE
[id number]    Orientations: A&O x 3 forgetful at times.  Vitals/Pain: VSS, NC 2L, No C/O pain.  Tele: Initially Afib RVR (HR about 100), Converted to SR.   Lines/Drains: G tube, Left AV fistula, PIV x 1 - SL.  Skin/Wounds: L AKA, Buttocks/Coccyx- PI, scattered bruising.   GI/: Mod Carb/ Easy to chew diet, Enteral feedings overnight, Q 4 BS: 173 at bedtime.   Ambulation/Assist: Assist of 2 with Lift, Q 2 Repo.   Sleep Quality: Resting between cares.   Plan: Current plan to discharge to TCU after dialysis, transport scheduled for appx 1400.

## 2025-02-26 NOTE — PLAN OF CARE
Pt discharged to [facility], [place], operated by [facility]. Transported by [facility] transport. PIV removed. Tele off. ZIO AT monitor applied prior to discharge. Pt acknowledges receipt of all personal belongings.       Daughter (Caroline Gray) contacted re:  of pt's wheelchair as it was unable to fit in ambulance. She will  this evening.

## 2025-02-26 NOTE — PLAN OF CARE
"Patient Name: Geronimo  MRN: [id number]  Date of Admission: 1/8/2025  Reason for Admission: SOB, influenza, RF   Level of Care: medical      [id number]     Vitals:   BP Readings from Last 1 Encounters:   02/26/25 (!) 146/40     Pulse Readings from Last 1 Encounters:   02/26/25 76     Wt Readings from Last 1 Encounters:   02/24/25 95.5 kg (210 lb 8.6 oz)     Ht Readings from Last 1 Encounters:   02/04/25 1.702 m (5' 7.01\")     Estimated body mass index is 32.97 kg/m  as calculated from the following:    Height as of this encounter: 1.702 m (5' 7.01\").    Weight as of this encounter: 95.5 kg (210 lb 8.6 oz).  Temp Readings from Last 1 Encounters:   02/25/25 98  F (36.7  C) (Oral)       Pain: denied pain    Assessment    Resp:  LS clear/dim on 1-2L NC overnight. DEVRIES. Infrequent, dry, non-productive cough.   Telemetry: SR   Neuro: AOx2-4, forgetful at times   GI/: TF running until 0600 @ 65ml/hr thru G tube, 890ml total, q4 30ml FWF. Meds thru G tube. Easy to chew/mod carb diet, thin liquids. +BS, +flatus, x1 small smear BM this shift. Anuric, dialyzed MWF  Skin/Wounds:  Coccyx/Sacrum/L buttocks PI, see WOC note, wound cleansed with vashe and stoma powder applied per WOC plan of care.  Rash under breasts and groin, ointment and miconazole applied. AV fistula L arm. L AKA.   Lines/Drains: G tube WDL, dressing changed x1, CDI. R PIV SL.   Activity: A2 turn/repo q2h   Sleep: 5-6 hours between cares  Abnormal Labs: Hgb 7.0, Cr 4.54, Cl 95.     Aggression Stop Light: Green          Patient Care Plan: Dialysis today @ 0800, hydralazine held. Discharge to [facility] [place] [facility] this afternoon @ 1330 pending insurance authorization, pt needs 3 days of TF formula sent with at discharge, see social work note.   "

## 2025-02-27 NOTE — PLAN OF CARE
Speech Language Therapy Discharge Summary    Reason for therapy discharge:    Discharged to transitional care facility.    Progress towards therapy goal(s). See goals on Care Plan in [facility] electronic health record for goal details.  Goals not met.  Barriers to achieving goals:   discharge from facility.    Therapy recommendation(s):    Continued therapy is recommended.  Rationale/Recommendations:  Continues short term SLP needs for dysphagia management.Patient discharged on an easy to chew IDDSI level 7 and thin liquids.

## 2025-03-01 ENCOUNTER — HOSPITAL ENCOUNTER (EMERGENCY)
Facility: CLINIC | Age: 75
Discharge: HOME | DRG: 286 | End: 2025-03-01
Attending: EMERGENCY MEDICINE | Admitting: EMERGENCY MEDICINE
Payer: COMMERCIAL

## 2025-03-01 ENCOUNTER — APPOINTMENT (OUTPATIENT)
Dept: GENERAL RADIOLOGY | Facility: CLINIC | Age: 75
DRG: 286 | End: 2025-03-01
Attending: EMERGENCY MEDICINE
Payer: COMMERCIAL

## 2025-03-01 VITALS
DIASTOLIC BLOOD PRESSURE: 61 MMHG | RESPIRATION RATE: 20 BRPM | TEMPERATURE: 97.7 F | HEART RATE: 75 BPM | SYSTOLIC BLOOD PRESSURE: 148 MMHG | OXYGEN SATURATION: 96 %

## 2025-03-01 DIAGNOSIS — F32.A ANXIETY AND DEPRESSION: ICD-10-CM

## 2025-03-01 DIAGNOSIS — Z86.79 HX OF CONGESTIVE HEART FAILURE: ICD-10-CM

## 2025-03-01 DIAGNOSIS — J96.21 ACUTE AND CHRONIC RESPIRATORY FAILURE WITH HYPOXIA (H): ICD-10-CM

## 2025-03-01 DIAGNOSIS — E78.5 HYPERLIPIDEMIA LDL GOAL <100: ICD-10-CM

## 2025-03-01 DIAGNOSIS — I10 HYPERTENSION GOAL BP (BLOOD PRESSURE) < 140/90: ICD-10-CM

## 2025-03-01 DIAGNOSIS — U07.1 INFECTION DUE TO 2019 NOVEL CORONAVIRUS: ICD-10-CM

## 2025-03-01 DIAGNOSIS — Z79.4 TYPE 2 DIABETES MELLITUS WITH DIABETIC NEUROPATHY, WITH LONG-TERM CURRENT USE OF INSULIN (H): ICD-10-CM

## 2025-03-01 DIAGNOSIS — Z99.2 ESRD (END STAGE RENAL DISEASE) ON DIALYSIS (H): ICD-10-CM

## 2025-03-01 DIAGNOSIS — N18.6 ESRD (END STAGE RENAL DISEASE) ON DIALYSIS (H): ICD-10-CM

## 2025-03-01 DIAGNOSIS — E11.628 DIABETIC FOOT INFECTION (H): ICD-10-CM

## 2025-03-01 DIAGNOSIS — R06.02 SOB (SHORTNESS OF BREATH): Primary | ICD-10-CM

## 2025-03-01 DIAGNOSIS — E78.5 DYSLIPIDEMIA: ICD-10-CM

## 2025-03-01 DIAGNOSIS — J10.1 INFLUENZA A: ICD-10-CM

## 2025-03-01 DIAGNOSIS — J90 PLEURAL EFFUSION: ICD-10-CM

## 2025-03-01 DIAGNOSIS — E11.9 TYPE 2 DIABETES MELLITUS (H): ICD-10-CM

## 2025-03-01 DIAGNOSIS — J96.11 CHRONIC RESPIRATORY FAILURE WITH HYPOXIA (H): ICD-10-CM

## 2025-03-01 DIAGNOSIS — I73.9 PVD (PERIPHERAL VASCULAR DISEASE): ICD-10-CM

## 2025-03-01 DIAGNOSIS — D64.9 ANEMIA, UNSPECIFIED TYPE: ICD-10-CM

## 2025-03-01 DIAGNOSIS — E87.5 HYPERKALEMIA: ICD-10-CM

## 2025-03-01 DIAGNOSIS — R79.89 ELEVATED TROPONIN: ICD-10-CM

## 2025-03-01 DIAGNOSIS — G47.33 OSA (OBSTRUCTIVE SLEEP APNEA): ICD-10-CM

## 2025-03-01 DIAGNOSIS — N18.6 ESRD ON DIALYSIS (H): ICD-10-CM

## 2025-03-01 DIAGNOSIS — Z99.2 ESRD ON DIALYSIS (H): ICD-10-CM

## 2025-03-01 DIAGNOSIS — L08.9 DIABETIC FOOT INFECTION (H): ICD-10-CM

## 2025-03-01 DIAGNOSIS — F41.9 ANXIETY AND DEPRESSION: ICD-10-CM

## 2025-03-01 DIAGNOSIS — J18.9 PNEUMONIA OF RIGHT LOWER LOBE DUE TO INFECTIOUS ORGANISM: ICD-10-CM

## 2025-03-01 DIAGNOSIS — E11.40 TYPE 2 DIABETES MELLITUS WITH DIABETIC NEUROPATHY, WITH LONG-TERM CURRENT USE OF INSULIN (H): ICD-10-CM

## 2025-03-01 DIAGNOSIS — F33.1 MODERATE RECURRENT MAJOR DEPRESSION (H): ICD-10-CM

## 2025-03-01 LAB
ANION GAP SERPL CALCULATED.3IONS-SCNC: 10 MMOL/L (ref 7–15)
ATRIAL RATE - MUSE: 77 BPM
BASOPHILS # BLD AUTO: 0.1 10E3/UL (ref 0–0.2)
BASOPHILS NFR BLD AUTO: 1 %
BUN SERPL-MCNC: 28.7 MG/DL (ref 8–23)
CALCIUM SERPL-MCNC: 9.7 MG/DL (ref 8.8–10.4)
CHLORIDE SERPL-SCNC: 97 MMOL/L (ref 98–107)
CREAT SERPL-MCNC: 2.45 MG/DL (ref 0.51–0.95)
DIASTOLIC BLOOD PRESSURE - MUSE: NORMAL MMHG
EGFRCR SERPLBLD CKD-EPI 2021: 20 ML/MIN/1.73M2
EOSINOPHIL # BLD AUTO: 0.1 10E3/UL (ref 0–0.7)
EOSINOPHIL NFR BLD AUTO: 1 %
ERYTHROCYTE [DISTWIDTH] IN BLOOD BY AUTOMATED COUNT: 17.2 % (ref 10–15)
FLUAV RNA SPEC QL NAA+PROBE: NEGATIVE
FLUBV RNA RESP QL NAA+PROBE: NEGATIVE
GLUCOSE SERPL-MCNC: 323 MG/DL (ref 70–99)
HCO3 SERPL-SCNC: 27 MMOL/L (ref 22–29)
HCT VFR BLD AUTO: 26.6 % (ref 35–47)
HGB BLD-MCNC: 8 G/DL (ref 11.7–15.7)
HOLD SPECIMEN: NORMAL
HOLD SPECIMEN: NORMAL
IMM GRANULOCYTES # BLD: 0.1 10E3/UL
IMM GRANULOCYTES NFR BLD: 1 %
INTERPRETATION ECG - MUSE: NORMAL
LYMPHOCYTES # BLD AUTO: 0.8 10E3/UL (ref 0.8–5.3)
LYMPHOCYTES NFR BLD AUTO: 9 %
MCH RBC QN AUTO: 30.4 PG (ref 26.5–33)
MCHC RBC AUTO-ENTMCNC: 30.1 G/DL (ref 31.5–36.5)
MCV RBC AUTO: 101 FL (ref 78–100)
MONOCYTES # BLD AUTO: 0.7 10E3/UL (ref 0–1.3)
MONOCYTES NFR BLD AUTO: 8 %
NEUTROPHILS # BLD AUTO: 6.8 10E3/UL (ref 1.6–8.3)
NEUTROPHILS NFR BLD AUTO: 80 %
NRBC # BLD AUTO: 0 10E3/UL
NRBC BLD AUTO-RTO: 0 /100
NT-PROBNP SERPL-MCNC: ABNORMAL PG/ML (ref 0–1800)
P AXIS - MUSE: 69 DEGREES
PLATELET # BLD AUTO: 183 10E3/UL (ref 150–450)
POTASSIUM SERPL-SCNC: 4.4 MMOL/L (ref 3.4–5.3)
PR INTERVAL - MUSE: 174 MS
QRS DURATION - MUSE: 100 MS
QT - MUSE: 412 MS
QTC - MUSE: 466 MS
R AXIS - MUSE: 69 DEGREES
RBC # BLD AUTO: 2.63 10E6/UL (ref 3.8–5.2)
RSV RNA SPEC NAA+PROBE: NEGATIVE
SARS-COV-2 RNA RESP QL NAA+PROBE: NEGATIVE
SODIUM SERPL-SCNC: 134 MMOL/L (ref 135–145)
SYSTOLIC BLOOD PRESSURE - MUSE: NORMAL MMHG
T AXIS - MUSE: -31 DEGREES
TROPONIN T SERPL HS-MCNC: 65 NG/L
TROPONIN T SERPL HS-MCNC: 73 NG/L
VENTRICULAR RATE- MUSE: 77 BPM
WBC # BLD AUTO: 8.5 10E3/UL (ref 4–11)

## 2025-03-01 PROCEDURE — 82565 ASSAY OF CREATININE: CPT | Performed by: EMERGENCY MEDICINE

## 2025-03-01 PROCEDURE — 85025 COMPLETE CBC W/AUTO DIFF WBC: CPT | Performed by: EMERGENCY MEDICINE

## 2025-03-01 PROCEDURE — 80048 BASIC METABOLIC PNL TOTAL CA: CPT | Performed by: EMERGENCY MEDICINE

## 2025-03-01 PROCEDURE — 99285 EMERGENCY DEPT VISIT HI MDM: CPT | Mod: 25

## 2025-03-01 PROCEDURE — 71046 X-RAY EXAM CHEST 2 VIEWS: CPT

## 2025-03-01 PROCEDURE — 83880 ASSAY OF NATRIURETIC PEPTIDE: CPT | Performed by: EMERGENCY MEDICINE

## 2025-03-01 PROCEDURE — 36415 COLL VENOUS BLD VENIPUNCTURE: CPT | Performed by: EMERGENCY MEDICINE

## 2025-03-01 PROCEDURE — 87637 SARSCOV2&INF A&B&RSV AMP PRB: CPT | Performed by: EMERGENCY MEDICINE

## 2025-03-01 PROCEDURE — 84484 ASSAY OF TROPONIN QUANT: CPT | Performed by: EMERGENCY MEDICINE

## 2025-03-01 ASSESSMENT — ACTIVITIES OF DAILY LIVING (ADL)
ADLS_ACUITY_SCORE: 61

## 2025-03-01 NOTE — PROGRESS NOTES
Care Management Follow Up    Length of Stay (days): 0    Expected Discharge Date:       Concerns to be Addressed:       Patient plan of care discussed at interdisciplinary rounds: No    Anticipated Discharge Disposition:                Anticipated Discharge Services:    Anticipated Discharge DME:      Patient/family educated on Medicare website which has current facility and service quality ratings:    Education Provided on the Discharge Plan:    Patient/Family in Agreement with the Plan:      Referrals Placed by CM/SW:    Private pay costs discussed: Not applicable    Discussed  Partnership in Safe Discharge Planning  document with patient/family: No     Handoff Completed: No, handoff not indicated or clinically appropriate    Additional Information:  Writer was notified by physician that patient may benefit from speaking with  regarding concerns at care facility. Writer spoke with patient's daughter, Caroline Gray, at bedside.    Patient had hospital stay from 01/08 - 02/26, and now has been residing at [facility] in [place]. Caroline Gray visits patient every day but has been concerned that [facility] is not able to meet patient's current needs. Caroline Gray stated she and spouse work from home and could better monitor patient there. Writer offered to provide information on skilled home care; Caroline Gray consented. Writer shared about how patient is able to transfer facilities with assistance from PCP. Writer offered to provide Skilled Nursing List for review if [facility] no longer the desired facility for patient; Caroline Gray consented. Writer shared about the [facility] office for LTC facilities if Caroline Gray wants to elevate concerns regarding care. Caroline Gray requested more information. Caroline Gray inquired about next steps, notably transportation back to [facility] as she is unable to provide. Writer reviewed information regarding medical transport from hospital and related costs; Caroline Gray  consented.    Writer printed out SNF list and home care agencies list and wrote down phone number for [facility] ([phone]). Writer provided these to Caroline Gray for review. Writer updated physician who stated bedside nurse was setting up transportation. Writer communicated this to Caroline Gray. No further questions or concerns cited at this time.    Next Steps: Return to [facility]/information regarding home care, other facilities, and state [facility] phone number; Bedside nurse arranging transportation    TOMER Alcaraz  [facility]  Inpatient Care Management

## 2025-03-01 NOTE — ED PROVIDER NOTES
Emergency Department Note      History of Present Illness     Chief Complaint  Shortness of Breath    HPI  Supriya Herr is a 75 year old female with history of CHF, Type 2 diabetes, anemia, PVD, and ESRD on dialysis who presents to the ED via EMS with her daughter for evaluation of shortness of breath. Patient is coming from a Care Facility that she has been staying for the past 3 days since coming from TCU. EMS reports patient called 911 because of shortness of breath this morning. Patient's nasal cannula was out of her nose when EMS arrived. Patient has dialysis MWF with last run yesterday. Her blood glucose was 250. Patient endorses shortness of breath which is unusual for the patient. Patient is on CPAP and is on 3 liter of Oxygen at home. Patient has difficulty swallowing and has a feeding tube. Her daughter adds on that patient has a loss of appetite due to it. Denies vomiting or diarrhea.Her daughter reports patient has been having chest pain and shortness of breath this week since being discharged from TCU. Her daughter checked patient's oxygen with an finger pulse oximeter and saw her oxygen levels in the 80s. She then checked patient's oxygen machine and saw it off. Daughter wasn't sure if it was off since arrival to care facility.     Independent Historian  Daughter and EMS as detailed above.    Review of External Notes  I reviewed the Discharge Summary from 2/26/25  Past Medical History   Medical History and Problem List   Anemia in chronic kidney disease  Anxiety and depression  Basal cell carcinoma  CKD (chronic kidney disease) stage 5,  Congestive heart failure   ESRD on dialysis    Dyslipidemia  Former tobacco use  Basal cell carcinoma (BCC)  Hyperlipidemia  Hypertension  Pneumonia  Psoriasis  Sleep apnea  Type 2 diabetes   Vitiligo    Medications   Pacerone  Norvasc  Eliquis  Lipitor  Sensipar  Lasix  Apresoline  Insulin glargine  Proamatine  Protonix  Zoloft  Renvela    Surgical History    Left leg amputation  Cataract extraction - bilateral  Colonoscopy  Create fistula arteriovenous upper extremity - right  Excise exostosis foot - right  Repair of hole in left retina   GI tube placement  Release trigger finger  Remove hardware foot - right  Pinning right hip  Physical Exam   Patient Vitals for the past 24 hrs:   BP Temp Temp src Pulse Resp SpO2   03/01/25 1231 (!) 148/61 -- -- 75 -- 96 %   03/01/25 1100 -- -- -- 70 20 99 %   03/01/25 1030 -- -- -- 68 18 100 %   03/01/25 1000 -- -- -- 73 13 100 %   03/01/25 0930 -- -- -- 70 26 100 %   03/01/25 0900 -- -- -- 72 24 97 %   03/01/25 0855 -- -- -- 75 20 94 %   03/01/25 0850 (!) 152/68 -- -- 75 -- --   03/01/25 0830 -- -- -- -- -- 97 %   03/01/25 0815 -- -- -- -- -- 96 %   03/01/25 0805 -- -- -- -- -- 94 %   03/01/25 0755 (!) 150/46 97.7  F (36.5  C) Oral 81 18 93 %     Physical Exam  General: Alert, appears chronically ill, on 2LPM O2 by NC.      In mild distress  HEENT:  Head:  Atraumatic  Ears:  External ears are normal  Mouth/Throat:  Oropharynx is without erythema or exudate and mucous membranes are moist.   Eyes:   Conjunctivae normal and EOM are normal. No scleral icterus.  CV:  Normal rate, regular rhythm, normal heart sounds and radial pulses are 2+ and symmetric. .  Resp:  Breath sounds are clear bilaterally    Non-labored, no retractions or accessory muscle use  GI:  Abdomen is soft, no distension, no tenderness. No rebound or guarding.  No CVA tenderness bilaterally  MS:  Normal range of motion. Left AKA.  No edema.    Normal strength in all 4 extremities.     Back atraumatic.    No midline cervical, thoracic, or lumbar tenderness  Skin:  Warm and dry.  No rash or lesions noted.  Neuro:   Alert. Normal strength.  GCS: 15  Psych: Normal mood and affect.    Diagnostics   Lab Results   Labs Ordered and Resulted from Time of ED Arrival to Time of ED Departure   BASIC METABOLIC PANEL - Abnormal       Result Value    Sodium 134 (*)     Potassium 4.4       Chloride 97 (*)     Carbon Dioxide (CO2) 27      Anion Gap 10      Urea Nitrogen 28.7 (*)     Creatinine 2.45 (*)     GFR Estimate 20 (*)     Calcium 9.7      Glucose 323 (*)    NT PROBNP INPATIENT - Abnormal    N terminal Pro BNP Inpatient 29,091 (*)    TROPONIN T, HIGH SENSITIVITY - Abnormal    Troponin T, High Sensitivity 65 (*)    CBC WITH PLATELETS AND DIFFERENTIAL - Abnormal    WBC Count 8.5      RBC Count 2.63 (*)     Hemoglobin 8.0 (*)     Hematocrit 26.6 (*)      (*)     MCH 30.4      MCHC 30.1 (*)     RDW 17.2 (*)     Platelet Count 183      % Neutrophils 80      % Lymphocytes 9      % Monocytes 8      % Eosinophils 1      % Basophils 1      % Immature Granulocytes 1      NRBCs per 100 WBC 0      Absolute Neutrophils 6.8      Absolute Lymphocytes 0.8      Absolute Monocytes 0.7      Absolute Eosinophils 0.1      Absolute Basophils 0.1      Absolute Immature Granulocytes 0.1      Absolute NRBCs 0.0     TROPONIN T, HIGH SENSITIVITY - Abnormal    Troponin T, High Sensitivity 73 (*)    INFLUENZA A/B, RSV AND SARS-COV2 PCR - Normal    Influenza A PCR Negative      Influenza B PCR Negative      RSV PCR Negative      SARS CoV2 PCR Negative       Imaging  XR Chest 2 Views   Final Result   IMPRESSION: Cardiomegaly with pulmonary vascular congestion and increased interstitial markings diffusely suggesting interstitial pulmonary edema. Trace pleural effusions. Findings consistent with congestive heart failure.              Report per radiology    EKG   ECG results from 03/01/25   EKG 12-lead, tracing only     Value    Systolic Blood Pressure     Diastolic Blood Pressure     Ventricular Rate 77    Atrial Rate 77    IN Interval 174    QRS Duration 100        QTc 466    P Axis 69    R AXIS 69    T Axis -31    Interpretation ECG      Sinus rhythm  Nonspecific ST and T wave abnormality  Abnormal ECG  When compared with ECG of 18-Feb-2025 09:07,  No significant change  Read at 0814       *Note: Due to a  large number of results and/or encounters for the requested time period, some results have not been displayed. A complete set of results can be found in Results Review.     Independent Interpretation  CXR: No pneumothorax or infiltrate.    ED Course    Medications Administered  Medications - No data to display    Procedures  Procedures     Discussion of Management  None    Additional Documentation  None    ED Course  ED Course as of 03/01/25 1326   Sat Mar 01, 2025   0750 I obtained history and examined the patient as noted above.    1114 I rechecked the patient and explained findings. I prepared the patient to be discharged home.    1145 I spoke with the  regarding the patient.      Medical Decision Making / Diagnosis   CMS Diagnoses: None    MIPS     None    MDM  Supriya Herr is a 75 year old female with a complex past medical history pertinent for ESRD on hemodialysis, CHF, COPD, atrial fibrillation, type 2 diabetes and hypertension with recent 2-month hospitalization discharged 2 days ago to a skilled nursing facility who presents due to shortness of breath and hypoxia today.  High concern her symptoms are related to lack of supplemental oxygen placement with her nasal cannula.  Her nasal cannula was found off of her face/body this morning and she was found to be hypoxic.  We have reapplied her supplemental oxygen with a nasal cannula at 3 L/min here with adequate resolution in her hypoxia symptoms.  Given that she describes feeling short of breath we did obtain a workup and her labs appear much improved in comparison with her most recent lab studies from her hospitalization.  I did review the patient's recent discharge summary for which she had an extensive hospitalization with multiple consultations with both cardiology and electrophysiology.  She's currently on amiodarone and continues this medication.  She also has been regularly going to dialysis 3 times a week.  Her troponin studies  although faintly dynamic here actually appear improved in comparison with her troponin values from several days ago and her recent hospitalization.  Her renal function appears improved and her electrolytes appear baseline for the patient.  Her BNP although elevated likely secondary to renal failure does show significant improvement in comparison with 3 weeks ago.  Patient appears relatively euvolemic on examination and she did have a full dialysis run yesterday.  Patient's chest x-ray shows interstitial pulmonary edema although thankfully I do not think this is off baseline for the patient given that she is relatively dialysis dependent and makes very minimal urine.  I do feel the patient is appropriate for continued outpatient management and follow-up with cardiology in the outpatient setting.  I have very low suspicion for ACS or new acute heart failure requiring hospitalization or more frequent dialysis runs and she is already scheduled.  She does have follow-up with cardiology in the next 2 weeks and can also follow-up with her primary care doctor in the next 1 week for recheck.  The daughter was quite hesitant and concerned about the patient returning to her skilled nursing facility, although I did have social work check with both the facility and reach out to the family member at bedside as well to help reassure that the patient will continue to receive appropriate cares, her daily medications, and her supplemental oxygen.  I suspect the main presentation today was secondary to lack of supplemental oxygen application and this has been rectified and we've ensured this will continue upon her return back to her skilled nursing facility today.  Patient discharged back to SNF by EMS.    Disposition  The patient was discharged.     ICD-10 Codes:    ICD-10-CM    1. SOB (shortness of breath)  R06.02 Primary Care - Care Coordination Referral      2. Hx of congestive heart failure  Z86.79       3. Chronic respiratory  failure with hypoxia (H)  J96.11 Primary Care - Care Coordination Referral      4. ESRD on dialysis (H)  N18.6     Z99.2       5. Infection due to 2019 novel coronavirus  U07.1 Primary Care - Care Coordination Referral      6. Hypertension goal BP (blood pressure) < 140/90  I10 Primary Care - Care Coordination Referral      7. ESRD (end stage renal disease) on dialysis (H)  N18.6 Primary Care - Care Coordination Referral    Z99.2       8. Anxiety and depression  F41.9 Primary Care - Care Coordination Referral    F32.A       9. Hyperlipidemia LDL goal <100  E78.5 Primary Care - Care Coordination Referral      10. Anemia, unspecified type  D64.9 Primary Care - Care Coordination Referral      11. Moderate recurrent major depression (H)  F33.1 Primary Care - Care Coordination Referral      12. Influenza A  J10.1 Primary Care - Care Coordination Referral      13. Acute and chronic respiratory failure with hypoxia (H)  J96.21 Primary Care - Care Coordination Referral      14. Type 2 diabetes mellitus with diabetic neuropathy, with long-term current use of insulin (H)  E11.40 Primary Care - Care Coordination Referral    Z79.4       15. JONE (obstructive sleep apnea)  G47.33 Primary Care - Care Coordination Referral      16. Pneumonia of right lower lobe due to infectious organism  J18.9 Primary Care - Care Coordination Referral      17. Dyslipidemia  E78.5 Primary Care - Care Coordination Referral      18. Pleural effusion  J90 Primary Care - Care Coordination Referral      19. Elevated troponin  R79.89 Primary Care - Care Coordination Referral      20. Hyperkalemia  E87.5 Primary Care - Care Coordination Referral      21. Diabetic foot infection (H)  E11.628 Primary Care - Care Coordination Referral    L08.9       22. PVD (peripheral vascular disease)  I73.9 Primary Care - Care Coordination Referral      23. Type 2 diabetes mellitus (H)  E11.9 Primary Care - Care Coordination Referral           Discharge Medications  New  Prescriptions    No medications on file     Scribe Disclosure:  I, Pooja Mcdonald, am serving as a scribe at 7:54 AM on 3/1/2025 to document services personally performed by Isrrael Lopez MD based on my observations and the provider's statements to me.      Isrrael Lopez MD  03/01/25 1322       Isrrael Lopez MD  03/01/25 8002

## 2025-03-01 NOTE — ED TRIAGE NOTES
"EMS report: in TCU after extensive recent hospitalization. Patient called 911 for SOB this morning. Patient's nasal cannula was out of nose this morning when EMS arrived. Patient has dialysis MWF and had successful run yesterday. EMS did US and report \"lungs full\".      Triage Assessment (Adult)       Row Name 03/01/25 0756          Triage Assessment    Airway WDL WDL        Respiratory WDL    Respiratory WDL X;rhythm/pattern     Rhythm/Pattern, Respiratory shortness of breath        Skin Circulation/Temperature WDL    Skin Circulation/Temperature WDL WDL        Cardiac WDL    Cardiac WDL WDL        Peripheral/Neurovascular WDL    Peripheral Neurovascular WDL WDL        Cognitive/Neuro/Behavioral WDL    Cognitive/Neuro/Behavioral WDL WDL        [place] Coma Scale    Best Eye Response 4-->(E4) spontaneous     Best Motor Response 6-->(M6) obeys commands     Best Verbal Response 5-->(V5) oriented     [place] Coma Scale Score 15                     "

## 2025-03-02 ENCOUNTER — HOSPITAL ENCOUNTER (INPATIENT)
Facility: CLINIC | Age: 75
LOS: 4 days | Discharge: NURSING, REHAB OR LONG-TERM CARE | DRG: 286 | End: 2025-03-06
Attending: EMERGENCY MEDICINE | Admitting: HOSPITALIST
Payer: COMMERCIAL

## 2025-03-02 ENCOUNTER — APPOINTMENT (OUTPATIENT)
Dept: GENERAL RADIOLOGY | Facility: CLINIC | Age: 75
DRG: 286 | End: 2025-03-02
Attending: EMERGENCY MEDICINE
Payer: COMMERCIAL

## 2025-03-02 DIAGNOSIS — Z99.2 ESRD ON HEMODIALYSIS (H): ICD-10-CM

## 2025-03-02 DIAGNOSIS — I50.23 ACUTE ON CHRONIC SYSTOLIC CONGESTIVE HEART FAILURE (H): ICD-10-CM

## 2025-03-02 DIAGNOSIS — Z99.2 ESRD (END STAGE RENAL DISEASE) ON DIALYSIS (H): Primary | ICD-10-CM

## 2025-03-02 DIAGNOSIS — E11.9 TYPE 2 DIABETES, HBA1C GOAL < 8% (H): ICD-10-CM

## 2025-03-02 DIAGNOSIS — E11.40 TYPE 2 DIABETES MELLITUS WITH DIABETIC NEUROPATHY, WITH LONG-TERM CURRENT USE OF INSULIN (H): ICD-10-CM

## 2025-03-02 DIAGNOSIS — N18.6 ESRD (END STAGE RENAL DISEASE) ON DIALYSIS (H): Primary | ICD-10-CM

## 2025-03-02 DIAGNOSIS — Z79.4 TYPE 2 DIABETES MELLITUS WITH DIABETIC NEUROPATHY, WITH LONG-TERM CURRENT USE OF INSULIN (H): ICD-10-CM

## 2025-03-02 DIAGNOSIS — N18.6 ESRD ON HEMODIALYSIS (H): ICD-10-CM

## 2025-03-02 DIAGNOSIS — R09.89 PULMONARY VASCULAR CONGESTION: ICD-10-CM

## 2025-03-02 DIAGNOSIS — R06.00 DYSPNEA, UNSPECIFIED TYPE: ICD-10-CM

## 2025-03-02 LAB
ANION GAP SERPL CALCULATED.3IONS-SCNC: 13 MMOL/L (ref 7–15)
ATRIAL RATE - MUSE: 71 BPM
BASOPHILS # BLD AUTO: 0 10E3/UL (ref 0–0.2)
BASOPHILS NFR BLD AUTO: 1 %
BUN SERPL-MCNC: 50.7 MG/DL (ref 8–23)
CALCIUM SERPL-MCNC: 9.8 MG/DL (ref 8.8–10.4)
CHLORIDE SERPL-SCNC: 95 MMOL/L (ref 98–107)
CREAT SERPL-MCNC: 3.98 MG/DL (ref 0.51–0.95)
DIASTOLIC BLOOD PRESSURE - MUSE: NORMAL MMHG
EGFRCR SERPLBLD CKD-EPI 2021: 11 ML/MIN/1.73M2
EOSINOPHIL # BLD AUTO: 0.2 10E3/UL (ref 0–0.7)
EOSINOPHIL NFR BLD AUTO: 3 %
ERYTHROCYTE [DISTWIDTH] IN BLOOD BY AUTOMATED COUNT: 16.8 % (ref 10–15)
FLUAV RNA SPEC QL NAA+PROBE: NEGATIVE
FLUBV RNA RESP QL NAA+PROBE: NEGATIVE
GLUCOSE BLDC GLUCOMTR-MCNC: 93 MG/DL (ref 70–99)
GLUCOSE SERPL-MCNC: 120 MG/DL (ref 70–99)
HCO3 SERPL-SCNC: 27 MMOL/L (ref 22–29)
HCT VFR BLD AUTO: 25.1 % (ref 35–47)
HGB BLD-MCNC: 7.6 G/DL (ref 11.7–15.7)
IMM GRANULOCYTES # BLD: 0.1 10E3/UL
IMM GRANULOCYTES NFR BLD: 1 %
INTERPRETATION ECG - MUSE: NORMAL
LYMPHOCYTES # BLD AUTO: 2.1 10E3/UL (ref 0.8–5.3)
LYMPHOCYTES NFR BLD AUTO: 25 %
MCH RBC QN AUTO: 29.9 PG (ref 26.5–33)
MCHC RBC AUTO-ENTMCNC: 30.3 G/DL (ref 31.5–36.5)
MCV RBC AUTO: 99 FL (ref 78–100)
MONOCYTES # BLD AUTO: 0.9 10E3/UL (ref 0–1.3)
MONOCYTES NFR BLD AUTO: 11 %
NEUTROPHILS # BLD AUTO: 4.8 10E3/UL (ref 1.6–8.3)
NEUTROPHILS NFR BLD AUTO: 59 %
NRBC # BLD AUTO: 0 10E3/UL
NRBC BLD AUTO-RTO: 0 /100
NT-PROBNP SERPL-MCNC: ABNORMAL PG/ML (ref 0–1800)
P AXIS - MUSE: 81 DEGREES
PLATELET # BLD AUTO: 212 10E3/UL (ref 150–450)
POTASSIUM SERPL-SCNC: 4.5 MMOL/L (ref 3.4–5.3)
PR INTERVAL - MUSE: 178 MS
QRS DURATION - MUSE: 94 MS
QT - MUSE: 396 MS
QTC - MUSE: 430 MS
R AXIS - MUSE: 74 DEGREES
RBC # BLD AUTO: 2.54 10E6/UL (ref 3.8–5.2)
RSV RNA SPEC NAA+PROBE: NEGATIVE
SARS-COV-2 RNA RESP QL NAA+PROBE: NEGATIVE
SODIUM SERPL-SCNC: 135 MMOL/L (ref 135–145)
SYSTOLIC BLOOD PRESSURE - MUSE: NORMAL MMHG
T AXIS - MUSE: 2 DEGREES
TROPONIN T SERPL HS-MCNC: 63 NG/L
TROPONIN T SERPL HS-MCNC: 64 NG/L
VENTRICULAR RATE- MUSE: 71 BPM
WBC # BLD AUTO: 8.2 10E3/UL (ref 4–11)

## 2025-03-02 PROCEDURE — 36415 COLL VENOUS BLD VENIPUNCTURE: CPT | Performed by: EMERGENCY MEDICINE

## 2025-03-02 PROCEDURE — 99285 EMERGENCY DEPT VISIT HI MDM: CPT | Mod: 25

## 2025-03-02 PROCEDURE — 99223 1ST HOSP IP/OBS HIGH 75: CPT | Performed by: HOSPITALIST

## 2025-03-02 PROCEDURE — 93005 ELECTROCARDIOGRAM TRACING: CPT

## 2025-03-02 PROCEDURE — 250N000011 HC RX IP 250 OP 636: Performed by: HOSPITALIST

## 2025-03-02 PROCEDURE — 82310 ASSAY OF CALCIUM: CPT | Performed by: EMERGENCY MEDICINE

## 2025-03-02 PROCEDURE — 80048 BASIC METABOLIC PNL TOTAL CA: CPT | Performed by: EMERGENCY MEDICINE

## 2025-03-02 PROCEDURE — 250N000013 HC RX MED GY IP 250 OP 250 PS 637: Performed by: HOSPITALIST

## 2025-03-02 PROCEDURE — 210N000002 HC R&B HEART CARE

## 2025-03-02 PROCEDURE — 82565 ASSAY OF CREATININE: CPT | Performed by: EMERGENCY MEDICINE

## 2025-03-02 PROCEDURE — 71046 X-RAY EXAM CHEST 2 VIEWS: CPT

## 2025-03-02 PROCEDURE — 85025 COMPLETE CBC W/AUTO DIFF WBC: CPT | Performed by: EMERGENCY MEDICINE

## 2025-03-02 PROCEDURE — 250N000011 HC RX IP 250 OP 636: Performed by: EMERGENCY MEDICINE

## 2025-03-02 PROCEDURE — 87637 SARSCOV2&INF A&B&RSV AMP PRB: CPT | Performed by: EMERGENCY MEDICINE

## 2025-03-02 PROCEDURE — 3E043XZ INTRODUCTION OF VASOPRESSOR INTO CENTRAL VEIN, PERCUTANEOUS APPROACH: ICD-10-PCS | Performed by: INTERNAL MEDICINE

## 2025-03-02 PROCEDURE — 83880 ASSAY OF NATRIURETIC PEPTIDE: CPT | Performed by: EMERGENCY MEDICINE

## 2025-03-02 PROCEDURE — 84484 ASSAY OF TROPONIN QUANT: CPT | Performed by: EMERGENCY MEDICINE

## 2025-03-02 PROCEDURE — 96374 THER/PROPH/DIAG INJ IV PUSH: CPT | Mod: 59

## 2025-03-02 RX ORDER — ACETAMINOPHEN 650 MG/1
650 SUPPOSITORY RECTAL EVERY 4 HOURS PRN
Status: DISCONTINUED | OUTPATIENT
Start: 2025-03-02 | End: 2025-03-03

## 2025-03-02 RX ORDER — ACETAMINOPHEN 325 MG/1
650 TABLET ORAL EVERY 4 HOURS PRN
Status: DISCONTINUED | OUTPATIENT
Start: 2025-03-02 | End: 2025-03-03

## 2025-03-02 RX ORDER — NICOTINE POLACRILEX 4 MG
15-30 LOZENGE BUCCAL
Status: DISCONTINUED | OUTPATIENT
Start: 2025-03-02 | End: 2025-03-03

## 2025-03-02 RX ORDER — SEVELAMER CARBONATE 800 MG/1
800 TABLET, FILM COATED ORAL 2 TIMES DAILY PRN
Status: DISCONTINUED | OUTPATIENT
Start: 2025-03-02 | End: 2025-03-03

## 2025-03-02 RX ORDER — FUROSEMIDE 10 MG/ML
60 INJECTION INTRAMUSCULAR; INTRAVENOUS ONCE
Status: COMPLETED | OUTPATIENT
Start: 2025-03-02 | End: 2025-03-02

## 2025-03-02 RX ORDER — SEVELAMER CARBONATE 800 MG/1
800 TABLET, FILM COATED ORAL
Status: ON HOLD | COMMUNITY
End: 2025-03-06

## 2025-03-02 RX ORDER — DEXTROSE MONOHYDRATE 25 G/50ML
25-50 INJECTION, SOLUTION INTRAVENOUS
Status: DISCONTINUED | OUTPATIENT
Start: 2025-03-02 | End: 2025-03-03

## 2025-03-02 RX ORDER — MIDODRINE HYDROCHLORIDE 5 MG/1
5 TABLET ORAL
Status: DISCONTINUED | OUTPATIENT
Start: 2025-03-03 | End: 2025-03-03

## 2025-03-02 RX ORDER — FUROSEMIDE 80 MG/1
80 TABLET ORAL DAILY
Status: DISCONTINUED | OUTPATIENT
Start: 2025-03-03 | End: 2025-03-03

## 2025-03-02 RX ORDER — OLOPATADINE HYDROCHLORIDE 1 MG/ML
1 SOLUTION/ DROPS OPHTHALMIC 2 TIMES DAILY PRN
Status: DISCONTINUED | OUTPATIENT
Start: 2025-03-02 | End: 2025-03-06 | Stop reason: HOSPADM

## 2025-03-02 RX ORDER — AMIODARONE HYDROCHLORIDE 200 MG/1
200 TABLET ORAL 2 TIMES DAILY
Status: DISCONTINUED | OUTPATIENT
Start: 2025-03-02 | End: 2025-03-03

## 2025-03-02 RX ORDER — CALCIUM CARBONATE 500 MG/1
1000 TABLET, CHEWABLE ORAL 4 TIMES DAILY PRN
Status: DISCONTINUED | OUTPATIENT
Start: 2025-03-02 | End: 2025-03-03

## 2025-03-02 RX ORDER — LIDOCAINE 40 MG/G
CREAM TOPICAL
Status: DISCONTINUED | OUTPATIENT
Start: 2025-03-02 | End: 2025-03-06 | Stop reason: HOSPADM

## 2025-03-02 RX ORDER — POLYETHYLENE GLYCOL 3350 17 G/17G
17 POWDER, FOR SOLUTION ORAL 2 TIMES DAILY PRN
Status: DISCONTINUED | OUTPATIENT
Start: 2025-03-02 | End: 2025-03-03

## 2025-03-02 RX ORDER — CHOLECALCIFEROL (VITAMIN D3) 50 MCG
50 TABLET ORAL DAILY
Status: DISCONTINUED | OUTPATIENT
Start: 2025-03-03 | End: 2025-03-03

## 2025-03-02 RX ORDER — CALCITRIOL 0.5 UG/1
0.5 CAPSULE, LIQUID FILLED ORAL
Status: DISCONTINUED | OUTPATIENT
Start: 2025-03-03 | End: 2025-03-03

## 2025-03-02 RX ORDER — B COMPLEX C NO.10/FOLIC ACID 900MCG/5ML
5 LIQUID (ML) ORAL DAILY
Status: DISCONTINUED | OUTPATIENT
Start: 2025-03-02 | End: 2025-03-02

## 2025-03-02 RX ORDER — AMOXICILLIN 250 MG
2 CAPSULE ORAL 2 TIMES DAILY PRN
Status: DISCONTINUED | OUTPATIENT
Start: 2025-03-02 | End: 2025-03-03

## 2025-03-02 RX ORDER — CINACALCET 30 MG/1
30 TABLET, FILM COATED ORAL
Status: DISCONTINUED | OUTPATIENT
Start: 2025-03-03 | End: 2025-03-06 | Stop reason: HOSPADM

## 2025-03-02 RX ORDER — SEVELAMER CARBONATE 800 MG/1
800 TABLET, FILM COATED ORAL SEE ADMIN INSTRUCTIONS
Status: DISCONTINUED | OUTPATIENT
Start: 2025-03-02 | End: 2025-03-02

## 2025-03-02 RX ORDER — CETIRIZINE HYDROCHLORIDE 10 MG/1
10 TABLET ORAL AT BEDTIME
Status: DISCONTINUED | OUTPATIENT
Start: 2025-03-02 | End: 2025-03-03

## 2025-03-02 RX ORDER — ATORVASTATIN CALCIUM 20 MG/1
20 TABLET, FILM COATED ORAL EVERY EVENING
Status: DISCONTINUED | OUTPATIENT
Start: 2025-03-02 | End: 2025-03-03

## 2025-03-02 RX ORDER — IPRATROPIUM BROMIDE AND ALBUTEROL SULFATE 2.5; .5 MG/3ML; MG/3ML
3 SOLUTION RESPIRATORY (INHALATION) EVERY 4 HOURS PRN
Status: DISCONTINUED | OUTPATIENT
Start: 2025-03-02 | End: 2025-03-06 | Stop reason: HOSPADM

## 2025-03-02 RX ORDER — ALBUTEROL SULFATE 90 UG/1
2 INHALANT RESPIRATORY (INHALATION) EVERY 6 HOURS PRN
Status: DISCONTINUED | OUTPATIENT
Start: 2025-03-02 | End: 2025-03-06 | Stop reason: HOSPADM

## 2025-03-02 RX ORDER — ONDANSETRON 2 MG/ML
4 INJECTION INTRAMUSCULAR; INTRAVENOUS EVERY 6 HOURS PRN
Status: DISCONTINUED | OUTPATIENT
Start: 2025-03-02 | End: 2025-03-06 | Stop reason: HOSPADM

## 2025-03-02 RX ORDER — AMOXICILLIN 250 MG
1 CAPSULE ORAL 2 TIMES DAILY PRN
Status: DISCONTINUED | OUTPATIENT
Start: 2025-03-02 | End: 2025-03-03

## 2025-03-02 RX ORDER — SEVELAMER CARBONATE 800 MG/1
800 TABLET, FILM COATED ORAL
Status: DISCONTINUED | OUTPATIENT
Start: 2025-03-03 | End: 2025-03-03

## 2025-03-02 RX ORDER — ONDANSETRON 4 MG/1
4 TABLET, ORALLY DISINTEGRATING ORAL EVERY 6 HOURS PRN
Status: DISCONTINUED | OUTPATIENT
Start: 2025-03-02 | End: 2025-03-06 | Stop reason: HOSPADM

## 2025-03-02 RX ORDER — LORAZEPAM 2 MG/ML
0.5 INJECTION INTRAMUSCULAR EVERY 6 HOURS PRN
Status: DISCONTINUED | OUTPATIENT
Start: 2025-03-02 | End: 2025-03-03

## 2025-03-02 RX ORDER — AMLODIPINE BESYLATE 5 MG/1
10 TABLET ORAL 2 TIMES DAILY
Status: DISCONTINUED | OUTPATIENT
Start: 2025-03-02 | End: 2025-03-03

## 2025-03-02 RX ORDER — HYDROXYZINE HYDROCHLORIDE 25 MG/1
25 TABLET, FILM COATED ORAL EVERY 6 HOURS PRN
Status: DISCONTINUED | OUTPATIENT
Start: 2025-03-02 | End: 2025-03-03

## 2025-03-02 RX ADMIN — HYDRALAZINE HYDROCHLORIDE 75 MG: 50 TABLET ORAL at 23:58

## 2025-03-02 RX ADMIN — SERTRALINE HYDROCHLORIDE 75 MG: 50 TABLET ORAL at 23:57

## 2025-03-02 RX ADMIN — AMIODARONE HYDROCHLORIDE 200 MG: 200 TABLET ORAL at 23:58

## 2025-03-02 RX ADMIN — FUROSEMIDE 60 MG: 10 INJECTION, SOLUTION INTRAVENOUS at 17:03

## 2025-03-02 RX ADMIN — AMLODIPINE BESYLATE 10 MG: 5 TABLET ORAL at 23:57

## 2025-03-02 RX ADMIN — LORAZEPAM 0.5 MG: 2 INJECTION INTRAMUSCULAR; INTRAVENOUS at 20:22

## 2025-03-02 RX ADMIN — CETIRIZINE HYDROCHLORIDE 10 MG: 10 TABLET, FILM COATED ORAL at 23:58

## 2025-03-02 RX ADMIN — ATORVASTATIN CALCIUM 20 MG: 20 TABLET, FILM COATED ORAL at 23:57

## 2025-03-02 ASSESSMENT — ACTIVITIES OF DAILY LIVING (ADL)
FALL_HISTORY_WITHIN_LAST_SIX_MONTHS: NO
ADLS_ACUITY_SCORE: 61
ADLS_ACUITY_SCORE: 65
ADLS_ACUITY_SCORE: 61
ADLS_ACUITY_SCORE: 65
ADLS_ACUITY_SCORE: 65
ADLS_ACUITY_SCORE: 61
ADLS_ACUITY_SCORE: 65
ADLS_ACUITY_SCORE: 61

## 2025-03-02 ASSESSMENT — COLUMBIA-SUICIDE SEVERITY RATING SCALE - C-SSRS
6. HAVE YOU EVER DONE ANYTHING, STARTED TO DO ANYTHING, OR PREPARED TO DO ANYTHING TO END YOUR LIFE?: NO
1. IN THE PAST MONTH, HAVE YOU WISHED YOU WERE DEAD OR WISHED YOU COULD GO TO SLEEP AND NOT WAKE UP?: NO
2. HAVE YOU ACTUALLY HAD ANY THOUGHTS OF KILLING YOURSELF IN THE PAST MONTH?: NO

## 2025-03-02 NOTE — ED TRIAGE NOTES
Patient BIBA after having anxiety and subsequent SOB. Patient was seen and worked up yesterday as well. Patient feels better upon arrival to ED. Patient with complex medical hx, currently in a nursing home.
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
## 2025-03-02 NOTE — Clinical Note
Hemodynamic equipment used: 5 lead ECG, [employer] With 3 Leads, Machine BP Cuff and pulse oximeter probe.

## 2025-03-03 ENCOUNTER — APPOINTMENT (OUTPATIENT)
Dept: SPEECH THERAPY | Facility: CLINIC | Age: 75
DRG: 286 | End: 2025-03-03
Attending: HOSPITALIST
Payer: COMMERCIAL

## 2025-03-03 ENCOUNTER — TELEPHONE (OUTPATIENT)
Dept: CARDIOLOGY | Facility: CLINIC | Age: 75
End: 2025-03-03
Payer: COMMERCIAL

## 2025-03-03 LAB
ANION GAP SERPL CALCULATED.3IONS-SCNC: 15 MMOL/L (ref 7–15)
BUN SERPL-MCNC: 55.9 MG/DL (ref 8–23)
CALCIUM SERPL-MCNC: 9.5 MG/DL (ref 8.8–10.4)
CHLORIDE SERPL-SCNC: 97 MMOL/L (ref 98–107)
CREAT SERPL-MCNC: 4.76 MG/DL (ref 0.51–0.95)
EGFRCR SERPLBLD CKD-EPI 2021: 9 ML/MIN/1.73M2
ERYTHROCYTE [DISTWIDTH] IN BLOOD BY AUTOMATED COUNT: 16.9 % (ref 10–15)
GLUCOSE BLDC GLUCOMTR-MCNC: 111 MG/DL (ref 70–99)
GLUCOSE BLDC GLUCOMTR-MCNC: 164 MG/DL (ref 70–99)
GLUCOSE BLDC GLUCOMTR-MCNC: 77 MG/DL (ref 70–99)
GLUCOSE SERPL-MCNC: 96 MG/DL (ref 70–99)
HBV CORE AB SERPL QL IA: NONREACTIVE
HBV SURFACE AB SERPL IA-ACNC: <3.5 M[IU]/ML
HBV SURFACE AB SERPL IA-ACNC: NONREACTIVE M[IU]/ML
HBV SURFACE AG SERPL QL IA: NONREACTIVE
HCO3 SERPL-SCNC: 23 MMOL/L (ref 22–29)
HCT VFR BLD AUTO: 24.2 % (ref 35–47)
HGB BLD-MCNC: 7.4 G/DL (ref 11.7–15.7)
MAGNESIUM SERPL-MCNC: 2.3 MG/DL (ref 1.7–2.3)
MCH RBC QN AUTO: 30.5 PG (ref 26.5–33)
MCHC RBC AUTO-ENTMCNC: 30.6 G/DL (ref 31.5–36.5)
MCV RBC AUTO: 100 FL (ref 78–100)
PHOSPHATE SERPL-MCNC: 4.7 MG/DL (ref 2.5–4.5)
PHOSPHATE SERPL-MCNC: 5 MG/DL (ref 2.5–4.5)
PLATELET # BLD AUTO: 184 10E3/UL (ref 150–450)
POTASSIUM SERPL-SCNC: 4.4 MMOL/L (ref 3.4–5.3)
RBC # BLD AUTO: 2.43 10E6/UL (ref 3.8–5.2)
SODIUM SERPL-SCNC: 135 MMOL/L (ref 135–145)
WBC # BLD AUTO: 6 10E3/UL (ref 4–11)

## 2025-03-03 PROCEDURE — 92610 EVALUATE SWALLOWING FUNCTION: CPT | Mod: GN | Performed by: SPEECH-LANGUAGE PATHOLOGIST

## 2025-03-03 PROCEDURE — 82310 ASSAY OF CALCIUM: CPT | Performed by: HOSPITALIST

## 2025-03-03 PROCEDURE — 99223 1ST HOSP IP/OBS HIGH 75: CPT | Performed by: INTERNAL MEDICINE

## 2025-03-03 PROCEDURE — 99232 SBSQ HOSP IP/OBS MODERATE 35: CPT | Performed by: INTERNAL MEDICINE

## 2025-03-03 PROCEDURE — 250N000013 HC RX MED GY IP 250 OP 250 PS 637: Performed by: INTERNAL MEDICINE

## 2025-03-03 PROCEDURE — 83735 ASSAY OF MAGNESIUM: CPT | Performed by: HOSPITALIST

## 2025-03-03 PROCEDURE — 99221 1ST HOSP IP/OBS SF/LOW 40: CPT | Performed by: INTERNAL MEDICINE

## 2025-03-03 PROCEDURE — G0463 HOSPITAL OUTPT CLINIC VISIT: HCPCS

## 2025-03-03 PROCEDURE — 86706 HEP B SURFACE ANTIBODY: CPT | Performed by: INTERNAL MEDICINE

## 2025-03-03 PROCEDURE — 84100 ASSAY OF PHOSPHORUS: CPT | Performed by: INTERNAL MEDICINE

## 2025-03-03 PROCEDURE — 999N000157 HC STATISTIC RCP TIME EA 10 MIN

## 2025-03-03 PROCEDURE — 5A09357 ASSISTANCE WITH RESPIRATORY VENTILATION, LESS THAN 24 CONSECUTIVE HOURS, CONTINUOUS POSITIVE AIRWAY PRESSURE: ICD-10-PCS | Performed by: INTERNAL MEDICINE

## 2025-03-03 PROCEDURE — 84100 ASSAY OF PHOSPHORUS: CPT | Performed by: HOSPITALIST

## 2025-03-03 PROCEDURE — 258N000003 HC RX IP 258 OP 636: Performed by: INTERNAL MEDICINE

## 2025-03-03 PROCEDURE — 210N000002 HC R&B HEART CARE

## 2025-03-03 PROCEDURE — 86704 HEP B CORE ANTIBODY TOTAL: CPT | Performed by: INTERNAL MEDICINE

## 2025-03-03 PROCEDURE — 80048 BASIC METABOLIC PNL TOTAL CA: CPT | Performed by: HOSPITALIST

## 2025-03-03 PROCEDURE — 250N000013 HC RX MED GY IP 250 OP 250 PS 637: Performed by: HOSPITALIST

## 2025-03-03 PROCEDURE — 90937 HEMODIALYSIS REPEATED EVAL: CPT

## 2025-03-03 PROCEDURE — 36415 COLL VENOUS BLD VENIPUNCTURE: CPT | Performed by: HOSPITALIST

## 2025-03-03 PROCEDURE — 92526 ORAL FUNCTION THERAPY: CPT | Mod: GN | Performed by: SPEECH-LANGUAGE PATHOLOGIST

## 2025-03-03 PROCEDURE — 250N000013 HC RX MED GY IP 250 OP 250 PS 637: Performed by: EMERGENCY MEDICINE

## 2025-03-03 PROCEDURE — 87340 HEPATITIS B SURFACE AG IA: CPT | Performed by: INTERNAL MEDICINE

## 2025-03-03 PROCEDURE — 85014 HEMATOCRIT: CPT | Performed by: HOSPITALIST

## 2025-03-03 PROCEDURE — 94660 CPAP INITIATION&MGMT: CPT

## 2025-03-03 PROCEDURE — 5A1D70Z PERFORMANCE OF URINARY FILTRATION, INTERMITTENT, LESS THAN 6 HOURS PER DAY: ICD-10-PCS | Performed by: INTERNAL MEDICINE

## 2025-03-03 RX ORDER — ATORVASTATIN CALCIUM 20 MG/1
20 TABLET, FILM COATED ORAL EVERY EVENING
Status: DISCONTINUED | OUTPATIENT
Start: 2025-03-03 | End: 2025-03-06 | Stop reason: HOSPADM

## 2025-03-03 RX ORDER — POLYETHYLENE GLYCOL 3350 17 G/17G
17 POWDER, FOR SOLUTION ORAL 2 TIMES DAILY PRN
Status: DISCONTINUED | OUTPATIENT
Start: 2025-03-03 | End: 2025-03-06 | Stop reason: HOSPADM

## 2025-03-03 RX ORDER — AMIODARONE HYDROCHLORIDE 200 MG/1
200 TABLET ORAL 2 TIMES DAILY
Status: DISCONTINUED | OUTPATIENT
Start: 2025-03-03 | End: 2025-03-06 | Stop reason: HOSPADM

## 2025-03-03 RX ORDER — FUROSEMIDE 10 MG/ML
80 SOLUTION ORAL DAILY
Status: DISCONTINUED | OUTPATIENT
Start: 2025-03-04 | End: 2025-03-06 | Stop reason: HOSPADM

## 2025-03-03 RX ORDER — CALCITRIOL 0.5 UG/1
0.5 CAPSULE, LIQUID FILLED ORAL
Status: DISCONTINUED | OUTPATIENT
Start: 2025-03-05 | End: 2025-03-06 | Stop reason: HOSPADM

## 2025-03-03 RX ORDER — AMINO ACIDS/PROTEIN HYDROLYS 11G-40/45
1 LIQUID IN PACKET (ML) ORAL 2 TIMES DAILY
Status: DISCONTINUED | OUTPATIENT
Start: 2025-03-03 | End: 2025-03-06 | Stop reason: HOSPADM

## 2025-03-03 RX ORDER — CALCIUM CARBONATE 500(1250)
500 TABLET ORAL 2 TIMES DAILY PRN
Status: DISCONTINUED | OUTPATIENT
Start: 2025-03-03 | End: 2025-03-06 | Stop reason: HOSPADM

## 2025-03-03 RX ORDER — NICOTINE POLACRILEX 4 MG
15-30 LOZENGE BUCCAL
Status: DISCONTINUED | OUTPATIENT
Start: 2025-03-03 | End: 2025-03-06 | Stop reason: HOSPADM

## 2025-03-03 RX ORDER — AMOXICILLIN 250 MG
1 CAPSULE ORAL 2 TIMES DAILY PRN
Status: DISCONTINUED | OUTPATIENT
Start: 2025-03-03 | End: 2025-03-06 | Stop reason: HOSPADM

## 2025-03-03 RX ORDER — HYDROXYZINE HYDROCHLORIDE 25 MG/1
25 TABLET, FILM COATED ORAL EVERY 6 HOURS PRN
Status: DISCONTINUED | OUTPATIENT
Start: 2025-03-03 | End: 2025-03-06 | Stop reason: HOSPADM

## 2025-03-03 RX ORDER — CALCIUM CARBONATE 500 MG/1
1000 TABLET, CHEWABLE ORAL 4 TIMES DAILY PRN
Status: DISCONTINUED | OUTPATIENT
Start: 2025-03-03 | End: 2025-03-06 | Stop reason: HOSPADM

## 2025-03-03 RX ORDER — CHOLECALCIFEROL (VITAMIN D3) 50 MCG
50 TABLET ORAL DAILY
Status: DISCONTINUED | OUTPATIENT
Start: 2025-03-04 | End: 2025-03-06 | Stop reason: HOSPADM

## 2025-03-03 RX ORDER — CETIRIZINE HYDROCHLORIDE 10 MG/1
10 TABLET ORAL AT BEDTIME
Status: DISCONTINUED | OUTPATIENT
Start: 2025-03-03 | End: 2025-03-03

## 2025-03-03 RX ORDER — MIDODRINE HYDROCHLORIDE 5 MG/1
5 TABLET ORAL
Status: DISCONTINUED | OUTPATIENT
Start: 2025-03-05 | End: 2025-03-06 | Stop reason: HOSPADM

## 2025-03-03 RX ORDER — ACETAMINOPHEN 325 MG/1
650 TABLET ORAL EVERY 4 HOURS PRN
Status: DISCONTINUED | OUTPATIENT
Start: 2025-03-03 | End: 2025-03-05

## 2025-03-03 RX ORDER — AMLODIPINE BESYLATE 10 MG/1
10 TABLET ORAL 2 TIMES DAILY
Status: DISCONTINUED | OUTPATIENT
Start: 2025-03-03 | End: 2025-03-06 | Stop reason: HOSPADM

## 2025-03-03 RX ORDER — ACETAMINOPHEN 650 MG/1
650 SUPPOSITORY RECTAL EVERY 4 HOURS PRN
Status: DISCONTINUED | OUTPATIENT
Start: 2025-03-03 | End: 2025-03-06 | Stop reason: HOSPADM

## 2025-03-03 RX ORDER — DEXTROSE MONOHYDRATE 25 G/50ML
25-50 INJECTION, SOLUTION INTRAVENOUS
Status: DISCONTINUED | OUTPATIENT
Start: 2025-03-03 | End: 2025-03-06 | Stop reason: HOSPADM

## 2025-03-03 RX ORDER — CALCIUM CARBONATE 500(1250)
500 TABLET ORAL
Status: DISCONTINUED | OUTPATIENT
Start: 2025-03-03 | End: 2025-03-06 | Stop reason: HOSPADM

## 2025-03-03 RX ORDER — CETIRIZINE HYDROCHLORIDE 5 MG/1
10 TABLET ORAL AT BEDTIME
Status: DISCONTINUED | OUTPATIENT
Start: 2025-03-03 | End: 2025-03-06 | Stop reason: HOSPADM

## 2025-03-03 RX ORDER — AMOXICILLIN 250 MG
2 CAPSULE ORAL 2 TIMES DAILY PRN
Status: DISCONTINUED | OUTPATIENT
Start: 2025-03-03 | End: 2025-03-06 | Stop reason: HOSPADM

## 2025-03-03 RX ADMIN — AMLODIPINE BESYLATE 10 MG: 5 TABLET ORAL at 14:12

## 2025-03-03 RX ADMIN — CALCIUM 500 MG: 500 TABLET ORAL at 17:14

## 2025-03-03 RX ADMIN — Medication 1 MG: at 20:45

## 2025-03-03 RX ADMIN — HYDROXYZINE HYDROCHLORIDE 25 MG: 25 TABLET ORAL at 14:14

## 2025-03-03 RX ADMIN — Medication: at 12:06

## 2025-03-03 RX ADMIN — CALCITRIOL 0.5 MCG: 0.5 CAPSULE ORAL at 14:12

## 2025-03-03 RX ADMIN — MIDODRINE HYDROCHLORIDE 5 MG: 5 TABLET ORAL at 09:01

## 2025-03-03 RX ADMIN — HYDRALAZINE HYDROCHLORIDE 75 MG: 50 TABLET ORAL at 17:15

## 2025-03-03 RX ADMIN — ATORVASTATIN CALCIUM 20 MG: 20 TABLET, FILM COATED ORAL at 20:45

## 2025-03-03 RX ADMIN — APIXABAN 5 MG: 5 TABLET, FILM COATED ORAL at 00:13

## 2025-03-03 RX ADMIN — Medication 50 MCG: at 14:14

## 2025-03-03 RX ADMIN — FUROSEMIDE 80 MG: 80 TABLET ORAL at 14:14

## 2025-03-03 RX ADMIN — CINACALCET 30 MG: 30 TABLET ORAL at 14:12

## 2025-03-03 RX ADMIN — SODIUM CHLORIDE 250 ML: 0.9 INJECTION, SOLUTION INTRAVENOUS at 12:06

## 2025-03-03 RX ADMIN — Medication 60 ML: at 20:47

## 2025-03-03 RX ADMIN — SERTRALINE HYDROCHLORIDE 75 MG: 50 TABLET ORAL at 20:46

## 2025-03-03 RX ADMIN — AMIODARONE HYDROCHLORIDE 200 MG: 200 TABLET ORAL at 20:45

## 2025-03-03 RX ADMIN — APIXABAN 5 MG: 5 TABLET, FILM COATED ORAL at 14:12

## 2025-03-03 RX ADMIN — CETIRIZINE HYDROCHLORIDE 10 MG: 1 SOLUTION ORAL at 20:46

## 2025-03-03 RX ADMIN — AMLODIPINE BESYLATE 10 MG: 10 TABLET ORAL at 20:45

## 2025-03-03 RX ADMIN — AMIODARONE HYDROCHLORIDE 200 MG: 200 TABLET ORAL at 09:01

## 2025-03-03 RX ADMIN — SODIUM CHLORIDE 200 ML: 0.9 INJECTION, SOLUTION INTRAVENOUS at 12:06

## 2025-03-03 RX ADMIN — APIXABAN 5 MG: 5 TABLET, FILM COATED ORAL at 20:45

## 2025-03-03 ASSESSMENT — ACTIVITIES OF DAILY LIVING (ADL)
ADLS_ACUITY_SCORE: 85
ADLS_ACUITY_SCORE: 87
DEPENDENT_IADLS:: CLEANING;COOKING;LAUNDRY;SHOPPING;MEDICATION MANAGEMENT;TRANSPORTATION
ADLS_ACUITY_SCORE: 87
ADLS_ACUITY_SCORE: 85
ADLS_ACUITY_SCORE: 87
ADLS_ACUITY_SCORE: 75
ADLS_ACUITY_SCORE: 85
ADLS_ACUITY_SCORE: 87
ADLS_ACUITY_SCORE: 87
ADLS_ACUITY_SCORE: 85
ADLS_ACUITY_SCORE: 75
ADLS_ACUITY_SCORE: 85

## 2025-03-03 NOTE — CONSULTS
CLINICAL NUTRITION SERVICES - ASSESSMENT NOTE    RECOMMENDATIONS FOR MDs/PROVIDERS TO ORDER:  Adjust insulin regimen as needed for re-start of nocturnal TF tonight at 6pm, patient to receive 144g CHO overnight.    Malnutrition Status:    Patient does not meet two of the established criteria necessary for diagnosing malnutrition  Malnutrition Present on Admission: No    Registered Dietitian Interventions:  Re-start tube feeds, same formula as previously using. TF to meet 97% min caloric and protein needs    Recommend TF as follows:   Type of Feeding Tube: PEG (placed 2/17)  Enteral Regimen: RelTel Renal Support at 70 mL/hr x 12 hrs (6pm - 6am)  Modulars: 2 pkts  Prosource TF20  Total Enteral Provisions: 840 mL daily provides 1672 kcal (18 kcal per kg), 107g protein (1.2 g per kg), 144g CHO, 17g fiber and 554 mL free water. Meets < 100% of DRI's.  Free Water Flush: 60 mL before/after each cycle  TF + fluid flush = 674 mL free water per day    Future/Additional Recommendations:  Monitor PO intakes, decrease TF provisions pending meal intakes     REASON FOR ASSESSMENT  Provider order - Registered Dietitian to order TF per Medical Nutrition Therapy Guidelines    SUBJECTIVE INFORMATION  Assessed patient in room.    NUTRITION HISTORY  Patient previously on tube feeds 1/9 - 2/26 while admitted at [facility], she was then discharged to TCU with tube feeding. She states that she continued to receive TF until she was admitted back to the hospital yesterday.    PEG tube placed 2/17    Previous Nutrition Support: Per 2/24 RD note  Type of Feeding Tube: GT (placed 2/17)   Enteral Frequency:  Continuous  Enteral Regimen: RelTel Renal @ 65 mL/hr x 14 hours (4 pm - 6 am)  Total Enteral Provisions: 910 mL, 1638 kcal (25 kcal/kg), 73 g protein (1.4 g/kg), 157 g CHO, 18 g fiber, 601 mL free water.   + 1 Pkt ProSource TF20 (80 kcal, 20 g protein)  TOTAL (TF + Prosource) = 1718 kcal (26 kcal/kg), 93 g protein (1.4 g/kg)   Free Water  "Flush: 30 mL q 4 hours     CURRENT NUTRITION ORDERS  Diet: Orders Placed This Encounter      Combination Diet Thin Liquids (level 0) (Sit at 90 degrees, stay up for 60+ minutes, slow rate, pick soft food, alternate bites and sips); Renal Diet (dialysis); Moderate Consistent Carb (60 g CHO per Meal) Diet  1500 mL fluid restriction    CURRENT INTAKE/TOLERANCE  Patient has regular diet, was recommended to order soft foods as shown above which she states she does not like. She has not had any meal intakes recorded this admission, but was preparing to eat lunch when this writer was in her room.    LABS  Nutrition-relevant labs: Reviewed    MEDICATIONS  Nutrition-relevant medications:  calcitriol, lasix, novolog, lantus, protonix, renvela, vitamin D3    ANTHROPOMETRICS  Height: 167.6 cm (5' 6\")  Most Recent Weight: 92.1 kg (203 lb)  BMI (kg/m ): Obesity Class I BMI 30-34.9  Weight History:   Weight fluctuated ~200-230# during previous hospitalization. Weight decreased since previous admission, though unable to r/o fluid losses d/t patient on HD  02/24/25 0500 95.5 kg (210 lb 8.6 oz) Bed scale     Wt Readings from Last 10 Encounters:   03/02/25 92.1 kg (203 lb 0.7 oz)   02/26/25 92.5 kg (203 lb 14.8 oz)   06/14/24 101.1 kg (222 lb 14.2 oz) - likely inaccurate, carried forward   06/03/24 101.1 kg (222 lb 14.2 oz)   04/24/24 101.1 kg (222 lb 14.2 oz)   02/05/24 95.8 kg (211 lb 3.2 oz)   04/21/23 98 kg (216 lb)   01/31/23 94.3 kg (208 lb)   06/27/22 94.3 kg (208 lb)   06/27/22 94.3 kg (208 lb)     Dosing Weight: 92 kg, based on actual wt    ASSESSED NUTRITION NEEDS  Estimated Energy Needs: 1433 x 1.2-1.3 SF = 5081-5550 kcals/day ([employer] [employer])  Justification: Maintenance  Estimated Protein Needs: 110-138 grams protein/day (1.2 - 1.5 grams of pro/kg)  Justification: Dialysis  Estimated Fluid Needs: Per provider pending fluid status    SYSTEM FINDINGS    Skin/wounds: Ben score 13, scab on left stump    GI symptoms: no " issues noted, LBM yesterday (soft)    MALNUTRITION  % Intake: No decreased intake noted - on TF + PO intakes meeting nutrition needs  % Weight Loss: Unable to assess  d/t fluid shifts on HD  Subcutaneous Fat Loss: None observed  Muscle Loss: Clavicles (pectoralis and deltoids): Mild and Shoulders (deltoids): Mild  Fluid Accumulation/Edema: Mild, 1+  Malnutrition Diagnosis: Patient does not meet two of the established criteria necessary for diagnosing malnutrition  Malnutrition Present on Admission: No    NUTRITION DIAGNOSIS  Inadequate oral intake related to diet texture restrictions, O2 needs as evidenced by current diet order comments/instructions by SLP, need for oximask.    INTERVENTIONS  Enteral nutrition management    Goals  Total avg nutritional intake to meet a minimum of 19 kcal/kg and 1.2 g PRO/kg daily (per dosing wt 92 kg).  Weight maintenance 92 kg +/- 1-2 kg     Monitoring/Evaluation  Progress toward goals will be monitored and evaluated per policy.    Tiffany Israel RDN, LD

## 2025-03-03 NOTE — H&P
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   / 212     135 95 (L) 50.7 (H) /  120 (H)   4.5 27 3.98 (H) \     Trop: 64 (H) BNP: 33,798 (H)       Imaging results reviewed over the past 24 hrs:   Recent Results (from the past 24 hours)   Chest XR,  PA & LAT    Narrative    EXAM: XR CHEST 2 VIEWS  LOCATION: [facility]  DATE: 3/2/2025    INDICATION: dyspnea  COMPARISON: 3/1/2025.      Impression    IMPRESSION: Cardiac enlargement. Small bilateral pleural effusions with some bibasilar atelectasis. Pulmonary venous hypertension. Findings are those of volume overload/congestive heart failure unchanged from previous.

## 2025-03-03 NOTE — CONSULTS
Care Management Initial Consult    General Information  Assessment completed with: Patient,    Type of CM/SW Visit: Offer D/C Planning    Primary Care Provider verified and updated as needed: Yes   Readmission within the last 30 days: no previous admission in last 30 days      Reason for Consult: discharge planning  Advance Care Planning: Advance Care Planning Reviewed: present on chart          Communication Assessment  Patient's communication style: spoken language (English or Bilingual)    Hearing Difficulty or Deaf: no   Wear Glasses or Blind: yes    Cognitive  Cognitive/Neuro/Behavioral: WDL  Level of Consciousness: intermittent confusion  Arousal Level: opens eyes spontaneously  Orientation: oriented x 4  Mood/Behavior: calm, cooperative  Best Language: 0 - No aphasia  Speech: spontaneous    Living Environment:   People in home: child(dominick), dependent, grandchild(dominick), other relative(s)     Current living Arrangements: house      Able to return to prior arrangements: yes       Family/Social Support:  Care provided by: child(dominick), self  Provides care for: no one  Marital Status:   Support system: Children          Description of Support System: Supportive, Involved         Current Resources:   Patient receiving home care services: No        Community Resources: Dialysis Services, DME  Equipment currently used at home: wheelchair, manual  Supplies currently used at home: Compression Stockings, Oxygen Tubing/Supplies, Diabetic Supplies    Employment/Financial:  Employment Status: retired        Financial Concerns: none   Referral to Financial Worker: No       Does the patient's insurance plan have a 3 day qualifying hospital stay waiver?  Yes     Which insurance plan 3 day waiver is available? [employer]    Will the waiver be used for post-acute placement? Undetermined at this time    Lifestyle & Psychosocial Needs:  Social Drivers of Health     Food Insecurity: Low Risk  (3/2/2025)    Food Insecurity      Within the past 12 months, did you worry that your food would run out before you got money to buy more?: No     Within the past 12 months, did the food you bought just not last and you didn t have money to get more?: No   Depression: Not at risk (9/11/2024)    PHQ-2     PHQ-2 Score: 0   Housing Stability: Low Risk  (3/2/2025)    Housing Stability     Do you have housing? : Yes     Are you worried about losing your housing?: No   Tobacco Use: Medium Risk (9/11/2024)    Patient History     Smoking Tobacco Use: Former     Smokeless Tobacco Use: Never     Passive Exposure: Not on file   Financial Resource Strain: Low Risk  (3/2/2025)    Financial Resource Strain     Within the past 12 months, have you or your family members you live with been unable to get utilities (heat, electricity) when it was really needed?: No   Alcohol Use: Not on file   Transportation Needs: Low Risk  (3/2/2025)    Transportation Needs     Within the past 12 months, has lack of transportation kept you from medical appointments, getting your medicines, non-medical meetings or appointments, work, or from getting things that you need?: No   Physical Activity: Insufficiently Active (7/19/2024)    Exercise Vital Sign     Days of Exercise per Week: 1 day     Minutes of Exercise per Session: 10 min   Interpersonal Safety: Low Risk  (3/2/2025)    Interpersonal Safety     Do you feel physically and emotionally safe where you currently live?: Yes     Within the past 12 months, have you been hit, slapped, kicked or otherwise physically hurt by someone?: No     Within the past 12 months, have you been humiliated or emotionally abused in other ways by your partner or ex-partner?: No   Stress: No Stress Concern Present (7/19/2024)    [employer] - Occupational Stress Questionnaire     Feeling of Stress : Only a little   Social Connections: Unknown (7/19/2024)    Social Connection and Isolation Panel [NHANES]     Frequency of  Communication with Friends and Family: Not on file     Frequency of Social Gatherings with Friends and Family: More than three times a week     Attends Restorationist Services: Not on file     Active Member of Clubs or Organizations: Not on file     Attends Club or Organization Meetings: Not on file     Marital Status: Not on file   Health Literacy: Not on file       Functional Status:  Prior to admission patient needed assistance:   Dependent ADLs:: Bathing, Dressing, Eating, Grooming, Transfers, Toileting  Dependent IADLs:: Cleaning, Cooking, Laundry, Shopping, Medication Management, Transportation       Mental Health Status:  Mental Health Status: No Current Concerns       Chemical Dependency Status:  Chemical Dependency Status: No Current Concerns             Values/Beliefs:  Spiritual, Cultural Beliefs, Restorationist Practices, Values that affect care:                 Discussed  Partnership in Safe Discharge Planning  document with patient/family: No    Additional Information:    Consult for discharge planning. Per H&P, patient is a 75 year old female with past medical history of end-stage renal disease on dialysis, type 2 diabetes mellitus, congestive heart failure, peripheral vascular disease, admitted 3/2/2025 for increasing shortness of breath.  Recent hospital discharge from [facility] 2/26/25     Writer reviewed chart. Writer met with patient at bedside and introduced self and role. Writer confirmed patient's primary doctor and home address as accurate. Patient goes to [facility] for dialysis.     Patient lives in a duplex with her daughter, son in law, and grand kids. Patient is retired and states no financial concerns at this time. Patient had home care in the past, but just recently came from [facility]. Patient has a shower chair, wheelchair, o2, diabetic supplies, and compression stockings. Patient needs assistance with most ADL's and IADL's, states some days are better than other.  Therapy to reconsult. Patient is agreeable to going back to TCU but does not want to return to [facility] unless she has to. Patient is okay with [facility] being [facility], [facility], [facility], and [facility] if TCU is recommended again. Patient does have a bed hold at [facility].     Next Steps: wait for therapy humas    MARK Washington

## 2025-03-03 NOTE — ED NOTES
[facility]  ED Nurse Handoff Report    ED Chief complaint: Shortness of Breath and Anxiety      ED Diagnosis:   Final diagnoses:   Dyspnea, unspecified type   ESRD on hemodialysis (H)   Pulmonary vascular congestion       Code Status: Full Code    Allergies:   Allergies   Allergen Reactions    Ampicillin-Sulbactam Sodium Rash     No evidence SJS, but very uncomfortable and precipitated multiple provider visits. Would not use penicillins again if other options available.     Penicillins Rash       Patient Story:   Patient with past medical history of ESRD on dialysis, DM2, chf, PVD and above knee amputation present with SOB and anxiety. Was seen and worked up yesterday, sent home last night.  Recent hospital discharge from [facility] 2/26/25.    Focused Assessment:    Neuro: Alert, oriented x 4  Respiratory:WDL except chronic supplemental o2, 2lnc. Dyspnea with exertion.  Cardiology:  CHF failure, has zio patch in place.   Gastrointestinal: Obese, Gtube in place  Genitourinary/Renal:  ESRD, fistula on left side  Musculoskeletal: Amputation, bed bound mostly  Skin: Has skin breakdown on coccyx  Lines: 20g    Labs Ordered and Resulted from Time of ED Arrival to Time of ED Departure   BASIC METABOLIC PANEL - Abnormal       Result Value    Sodium 135      Potassium 4.5      Chloride 95 (*)     Carbon Dioxide (CO2) 27      Anion Gap 13      Urea Nitrogen 50.7 (*)     Creatinine 3.98 (*)     GFR Estimate 11 (*)     Calcium 9.8      Glucose 120 (*)    TROPONIN T, HIGH SENSITIVITY - Abnormal    Troponin T, High Sensitivity 64 (*)    NT PROBNP INPATIENT - Abnormal    N terminal Pro BNP Inpatient 33,798 (*)    CBC WITH PLATELETS AND DIFFERENTIAL - Abnormal    WBC Count 8.2      RBC Count 2.54 (*)     Hemoglobin 7.6 (*)     Hematocrit 25.1 (*)     MCV 99      MCH 29.9      MCHC 30.3 (*)     RDW 16.8 (*)     Platelet Count 212      % Neutrophils 59      % Lymphocytes 25      % Monocytes 11       % Eosinophils 3      % Basophils 1      % Immature Granulocytes 1      NRBCs per 100 WBC 0      Absolute Neutrophils 4.8      Absolute Lymphocytes 2.1      Absolute Monocytes 0.9      Absolute Eosinophils 0.2      Absolute Basophils 0.0      Absolute Immature Granulocytes 0.1      Absolute NRBCs 0.0     INFLUENZA A/B, RSV AND SARS-COV2 PCR - Normal    Influenza A PCR Negative      Influenza B PCR Negative      RSV PCR Negative      SARS CoV2 PCR Negative     TROPONIN T, HIGH SENSITIVITY        Chest XR,  PA & LAT   Final Result   IMPRESSION: Cardiac enlargement. Small bilateral pleural effusions with some bibasilar atelectasis. Pulmonary venous hypertension. Findings are those of volume overload/congestive heart failure unchanged from previous.            Treatments and/or interventions provided:      Medications   furosemide (LASIX) injection 60 mg (60 mg Intravenous $Given 3/2/25 7218)        Patient's response to treatments and/or interventions:   Resting comfortably    To be done/followed up on inpatient unit:    See any in-patient orders    Does this patient have any cognitive concerns?:  A&Ox4    Activity level - Baseline/Home:    Total Care    Activity Level - Current:     Total Care    Patient's Preferred language: English     Needed?: No    Isolation: None  Infection: Not Applicable  Patient tested for COVID 19 prior to admission: YES    Bariatric?: No    Vital Signs:   Vitals:    03/02/25 1430 03/02/25 1500 03/02/25 1610 03/02/25 1800   BP:   (!) 144/86 (!) 151/50   Pulse:   68 73   Resp:  22 25 15   Temp:       TempSrc:       SpO2: 98% 96% 99%    Weight:       Height:           Cardiac Rhythm:     Was the PSS-3 completed:   Yes    Family Comments: Daughter bedside    For the majority of the shift this patient's behavior was Green.   Behavioral interventions performed were     ED NURSE PHONE NUMBER: [phone]          Instructions: This plan will send the code FBSE to the PM system.  DO NOT or CHANGE the price. Price (Do Not Change): 0.00 Detail Level: Generalized
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
## 2025-03-03 NOTE — CONSULTS
"[facility] Nurse Inpatient Assessment     Consulted for: \"buttock\"    Summary: POA stage 2 vs 3 pressure injury to coccyx, deferring staging until wound base has evolved. Scar tissue and loose peeling skin noted to left buttocks from recent DTPI, skin is intact.    [facility] nurse follow-up plan: weekly    Patient History (according to provider note(s):      \"Supriya Herr is a 75 year old female with past medical history of end-stage renal disease on dialysis, type 2 diabetes mellitus, congestive heart failure, peripheral vascular disease, admitted 3/2/2025 for increasing shortness of breath.  Recent hospital discharge from [facility] 2/26/25, see dismissal summary.     Acute heart failure with preserved ejection fraction (HFpEF)  Acute hypoxic respiratory failure  Shortness of breath, anxiety  Small bilateral pleural effusions, pulmonary venous hypertension  Elevated BNP  Elevated troponin  Essential hypertension  History of bradycardia  Chronic anticoagulation, apixaban (Eliquis)   Amiodarone therapy  Recent hospitalization as above for acute hypoxic respiratory failure requiring mechanical ventilation in the setting of influenza A pneumonia, acute COPD exacerbation, hospital-acquired pneumonia, and pharyngeal edema  Hospital admission for shortness of breath in the setting of recent hospital discharge 2/26/2025, see dismissal summary for details.  Complicated hospital stay with acute respiratory failure, pneumonia, acute heart failure, and atrial fibrillation.  History of paroxysmal atrial fibrillation with rapid ventricular response.  Cardiology and electrophysiology consulted on prior hospital stay, required IV amiodarone at that time with transition to oral therapy.  Initial plans for permanent pacemaker on prior hospital stay, later not pursued with no clear indication for placement at this time\"    Assessment:      Areas visualized during today's visit: " "Focused:    Pressure Injury Location: Coccyx      Last photo: 3/3  Wound type: Pressure Injury     Pressure Injury Stage: either Stage 2 or 3 deferring definitive staging until wound base has evolved, present on admission        Wound history/plan of care:   Sacral Mepilex in place on assessment. Skin on left buttock is peeling and there is scar tissue present. Open wound present on coccyx, deferring definitive staging.    Wound base: Moist, Pink, Yellow, and granulation buds throughout wound bed     Palpation of the wound bed: normal      Drainage: small     Description of drainage: serous     Measurements (length x width x depth, in cm) 0.9  x 0.7  x  0.1 cm      Tunneling N/A     Undermining N/A  Periwound skin: Intact, Dry/scaly, and Scar tissue      Color: normal and consistent with surrounding tissue      Temperature: normal   Odor: none  Pain: denies  and no grimacing or signs of discomfort, none  Pain intervention prior to dressing change: patient tolerated well and slow and gentle cares   Treatment goal: Heal , Infection control/prevention, Protection, and Simplify plan of care  STATUS: initial assessment  Supplies ordered: supplies stored on unit, discussed with RN, and discussed with patient     Treatment Plan:     Coccyx wound(s): Every 3 days and prn if soiled or damaged  Cleanse wounds with MicroKlenze wound cleanser and pat dry with gauze   Apply Cavilon No-sting barrier to periwound  Cover with a Sacral Mepilex border  Follow PIP plan    Pressure Injury Prevention (PIP) Plan:  If patient is declining pressure injury prevention interventions: Explore reason why and address patient's concerns, Educate on pressure injury risk and prevention intervention(s), If patient is still declining, document \"informed refusal\" , and Ensure Care team is aware ( provider, charge nurse, etc)  Mattress: Follow bed algorithm, add Low Air Loss (Air+) mattress pump if skin is very moist or constantly moist.   HOB: " Maintain at or below 30 degrees, unless contraindicated  Repositioning in bed: Every 1-2 hours , Left/right positioning; avoid supine, and Raise foot of bed prior to raising head of bed, to reduce patient sliding down (shear)  Heels: Keep elevated off mattress and Pillows under calves  Protective Dressing: Sacral Mepilex for prevention (#040225),  especially for the agitated patient   Positioning Equipment:None  Chair positioning: Chair cushion (#101616) , Assist patient to reposition hourly, and Do NOT use a donut for sitting (this increases pressure to smaller area and creates a higher potential for injury)    If patient has a buttock pressure injury, or high risk for PI use chair cushion or SPS.  Moisture Management: Perineal cleansing /protection: Follow Incontinence Protocol, Avoid brief in bed, Clean and dry skin folds with bathing , and Moisturize dry skin  Under Devices: Inspect skin under all medical devices during skin inspection , Ensure tubes are stabilized without tension, and Ensure patient is not lying on medical devices or equipment when repositioned  Ask provider to discontinue device when no longer needed.       Orders: Written    RECOMMEND PRIMARY TEAM ORDER: None, at this time  Education provided: importance of repositioning, plan of care, wound progress, Infection prevention , Moisture management, Hygiene, and Off-loading pressure  Discussed plan of care with: Patient and Nurse  Notify WOC if wound(s) deteriorate.  Nursing to notify the Provider(s) and re-consult the WOC Nurse if new skin concern.    DATA:     Current support surface: Standard  Standard gel mattress (Isoflex)  Containment of urine/stool: Incontinence Protocol, Incontinent pad in bed, and Suction based external urinary catheter   BMI: Body mass index is 32.77 kg/m .   Active diet order: Orders Placed This Encounter      Combination Diet Renal Diet (dialysis); Moderate Consistent Carb (60 g CHO per Meal) Diet       Output:   I/O  last 3 completed shifts:  In: 0   Out: 100 [Urine:100]     Labs:   Recent Labs   Lab 03/03/25  0631 02/26/25  0844 02/26/25  0544   ALBUMIN  --   --  2.9*   HGB 7.4*   < > 7.0*   WBC 6.0   < > 5.8    < > = values in this interval not displayed.     Pressure injury risk assessment:                          Jennifer Carney RN CWCN  Pager no longer is use, please contact through [employer] group: [facility] Nurse [place]

## 2025-03-03 NOTE — PLAN OF CARE
Goal Outcome Evaluation:      Plan of Care Reviewed With: patient, child        [id number]  A&O x 4, forgetful. Reports anxiety while in hospital, reassurance provided, feelings acknowledged. VSS, O2 stable on 2L NC. SLP consulted, recommendations in place. Pt requesting meds through PEG tube. Down to dialysis from [id number], 2L off per dialysis RN, tolerated well. Tolerated lunch, intake encouraged, fair appetite. WOC to bedside, turn and repo. Denies pain.  Plan of care ongoing.

## 2025-03-03 NOTE — PLAN OF CARE
Goal Outcome Evaluation:  -~Care plan-end of shift note: [id number]   -~Orientation/Mentation:A&O x4, forgetful, reoriented   -~VS: VSS, used CPAP overnight, now on 5L/oxymask, overnight CPAP-tolerated well  -~LS/Pulm:Ls diminished   -~Tele/Cardiac:SR  -~GI:WDL ex obese, GT in place  -~:external catheter in place, goes to dialysis, L upper arm AV fistula +thrill/bruit   -~Pain:denies   -~Mobility:up w/2A/lift   -~Skin:scattered bruises/scabs, coccyx-buttocks wounds, L AKA, RLE shin/foot discoloration   -~Diet:Mod CHO, 1500 ML FR  -~Lines/IVs:PIV SL   -~Safety/Concern:Fall risk   -~Aggression color:green   -~Plan/Shift summary/Goals:[place] [facility] [facility] [place]. Plans for SW/PT/OT/nutrition/nephrology/WOC -RN and cardiology consult

## 2025-03-03 NOTE — PROGRESS NOTES
Potassium   Date Value Ref Range Status   03/03/2025 4.4 3.4 - 5.3 mmol/L Final   02/02/2022 4.7 3.4 - 5.3 mmol/L Final   04/17/2021 4.2 3.4 - 5.3 mmol/L Final     Hemoglobin   Date Value Ref Range Status   03/03/2025 7.4 (L) 11.7 - 15.7 g/dL Final   04/16/2021 10.9 (L) 11.7 - 15.7 g/dL Final     Creatinine   Date Value Ref Range Status   03/03/2025 4.76 (H) 0.51 - 0.95 mg/dL Final   04/17/2021 5.98 (H) 0.52 - 1.04 mg/dL Final     Urea Nitrogen   Date Value Ref Range Status   03/03/2025 55.9 (H) 8.0 - 23.0 mg/dL Final   11/24/2021 37 (H) 7 - 30 mg/dL Final   04/17/2021 68 (H) 7 - 30 mg/dL Final     Sodium   Date Value Ref Range Status   03/03/2025 135 135 - 145 mmol/L Final   04/17/2021 137 133 - 144 mmol/L Final     INR   Date Value Ref Range Status   02/14/2025 1.57 (H) 0.85 - 1.15 Final   04/16/2021 1.14 0.86 - 1.14 Final       DIALYSIS PROCEDURE NOTE  Hepatitis status of previous patient on machine log was checked and verified ok to use with this patients hepatitis status.  Patient dialyzed for 3.5 hrs. on a K3 bath with a net fluid removal of  2L.  A BFR of 450 ml/min was obtained via a left arm AVfistula using 15 gauge needles.      The treatment plan was discussed with Dr. Roman during the treatment.    Total heparin received during the treatment: 0 units.   Needle cannulation sites held x 10 min.     Meds  given: None   Complications: None      Person educated: pt. Knowledge base substantial. Barriers to learning: None. Educated on procedure via verbal mode. Procedure patient verbalized understanding.   ICEBOAT? Timeout performed pre-treatment  I: Patient was identified using 2 identifiers  C:  Consent Signed Yes  E: Equipment preventative maintenance is current and dialysis delivery system OK to use  B: Hepatitis B Surface Antigen: Negative; Draw Date: 2/26/2025      Hepatitis B Surface Antibody: Susceptible; Draw Date: 2/26/2024  O: Dialysis orders present and complete prior to treatment  A: Vascular  access verified and assessed prior to treatment  T: Treatment was performed at a clinically appropriate time  ?: Patient was allowed to ask questions and address concerns prior to treatment  See Adult Hemodialysis flowsheet in EPIC for further details and post assessment.  Machine water alarm in place and functioning. Transducer pods intact and checked every 15min.   Pt assisted with repositioning throughout dialysis treatment.  Pt returned via bed.  Chlorine/Chloramine water system checked every 4 hours.  Outpatient Dialysis at [facility].       Post treatment report given to Carla HAYNES RN regarding 2 L of fluid removed, last BP      [facility] RN

## 2025-03-03 NOTE — PHARMACY-ADMISSION MEDICATION HISTORY
Pharmacist Admission Medication History    Admission medication history is complete. The information provided in this note is only as accurate as the sources available at the time of the update.    Information Source(s): Facility ([facility]/) medication list/MAR ([facility]) via N/A    Pertinent Information:     Changes made to PTA medication list:  Added: None  Deleted: calcitriol, cinacalcet  Changed: hydralazine, midodrine, amlodipine, sevelamer     Allergies reviewed with patient and updates made in EHR: no    Medication History Completed By: DAIJA CHRISTIAN RPH 3/2/2025 8:02 PM    PTA Med List   Medication Sig Last Dose/Taking    acetaminophen (TYLENOL) 325 MG tablet Take 2 tablets (650 mg) by mouth every 4 hours as needed for mild pain Taking As Needed    albuterol (PROAIR HFA/PROVENTIL HFA/VENTOLIN HFA) 108 (90 Base) MCG/ACT inhaler Inhale 2 puffs into the lungs every 6 hours as needed for shortness of breath / dyspnea or wheezing Taking As Needed    amiodarone (PACERONE) 200 MG tablet Take 1 tablet (200 mg) by mouth 2 times daily. 3/2/2025 Morning    amLODIPine (NORVASC) 5 MG tablet Take 2 tablets (10 mg) by mouth 2 times daily. (Hold for SBP<100) (Patient taking differently: Take 10 mg by mouth four times a week. Take on Tuesdays, Thursdays, Saturdays, Sundays (Hold for SBP<100)) 3/2/2025 Morning    apixaban ANTICOAGULANT (ELIQUIS) 5 MG tablet Take 1 tablet (5 mg) by mouth or Feeding Tube 2 times daily. 3/2/2025 Morning    atorvastatin (LIPITOR) 20 MG tablet Take 1 tablet (20 mg) by mouth every evening. 3/1/2025 Evening    cetirizine (ZYRTEC) 10 MG tablet Take 10 mg by mouth at bedtime. 3/1/2025 Bedtime    furosemide (LASIX) 80 MG tablet Take 1 tablet (80 mg) by mouth or Feeding Tube daily. 3/2/2025 Morning    hydrALAZINE (APRESOLINE) 25 MG tablet Take 3 tablets (75 mg) by mouth every 8 hours. (Patient taking differently: Take 75 mg by mouth 3 times daily as needed (systolic >  165).) Taking Differently    insulin glargine 100 UNIT/ML pen Inject 15 Units subcutaneously 2 times daily. 3/2/2025 Morning    ipratropium - albuterol 0.5 mg/2.5 mg/3 mL (DUONEB) 0.5-2.5 (3) MG/3ML neb solution Take 1 vial (3 mLs) by nebulization every 4 hours as needed for wheezing or shortness of breath. Taking As Needed    miconazole (MICATIN) 2 % external powder Apply topically 2 times daily as needed (redness under breasts/groin) Apply twice daily to skin folds as needed Taking As Needed    midodrine (PROAMATINE) 5 MG tablet Take 1 tablet (5 mg) by mouth three times a week. (Prior to dialysis) (Patient taking differently: Take 5 mg by mouth three times a week. Monday, Wednesday, Friday (Prior to dialysis)) Past Week    olopatadine (PATANOL) 0.1 % ophthalmic solution Place 1 drop into both eyes 2 times daily as needed for allergies. Taking As Needed    pantoprazole (PROTONIX) 2 mg/mL SUSP suspension Place 20 mLs (40 mg) into Feeding Tube every morning (before breakfast). 3/2/2025 Morning    sertraline (ZOLOFT) 25 MG tablet TAKE 1  TABLET BY MOUTH EVERY DAY along with a 50 mg tablet for a total of 75 mg 3/2/2025 Morning    sertraline (ZOLOFT) 50 MG tablet Take 1 tablet (50 mg) by mouth at bedtime (Patient taking differently: Take 50 mg by mouth daily. Take with 25 mg tablet for total daily dose of 75 mg) 3/2/2025 Morning    sevelamer carbonate (RENVELA) 800 MG tablet Take 800 mg by mouth 3 times daily (with meals). 3/2/2025 Noon    sevelamer carbonate (RENVELA) 800 MG tablet Take two with meals and one with snacks (Patient taking differently: Take 800 mg by mouth 2 times daily as needed (with snacks).) 3/1/2025    tacrolimus (PROTOPIC) 0.1 % external ointment Apply topically 2 times daily. To skin folds 3/2/2025 Morning    triamcinolone (KENALOG) 0.025 % external ointment Apply topically 2 times daily. To rash under breasts and groin as needed (Patient taking differently: Apply topically 2 times daily as needed.  To rash under breasts and groin as needed) Taking Differently    Vitamin D3 50 mcg (2000 units) tablet TAKE ONE TABLET BY MOUTH ONCE DAILY 3/2/2025 Morning

## 2025-03-03 NOTE — CONSULTS
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 Labs   Lab 03/03/25  0631 03/02/25  2347 03/02/25  1606 03/01/25  0809 02/26/25  0602 02/26/25  0544   WBC 6.0  --  8.2 8.5  --  5.8   HGB 7.4*  --  7.6* 8.0*   < > 7.0*     --  99 101*  --  100     --  212 183  --  201     --  135 134*  --  135   POTASSIUM 4.4  --  4.5 4.4  --  4.4   CHLORIDE 97*  --  95* 97*  --  95*   CO2 23  --  27 27  --  27   BUN 55.9*  --  50.7* 28.7*  --  80.6*   CR 4.76*  --  3.98* 2.45*  --  4.54*   GFRESTIMATED 9*  --  11* 20*  --  9*   ANIONGAP 15  --  13 10  --  13   JODY 9.5  --  9.8 9.7  --  9.5   GLC 96 93 120* 323*   < > 237*   ALBUMIN  --   --   --   --   --  2.9*    < > = values in this interval not displayed.     Recent Labs   Lab Test 04/21/23  1405 01/17/20  1204   CHOL 113 145   HDL 50 55   LDL 39 74   TRIG 122 78 " "    Recent Labs   Lab 03/03/25  0631 03/02/25  1606 03/01/25  0809   WBC 6.0 8.2 8.5   HGB 7.4* 7.6* 8.0*   HCT 24.2* 25.1* 26.6*    99 101*    212 183     No results for input(s): \"PH\", \"PHV\", \"PO2\", \"PO2V\", \"SAT\", \"PCO2\", \"PCO2V\", \"HCO3\", \"HCO3V\" in the last 168 hours.  Recent Labs   Lab 03/02/25  1606 03/01/25  0809   NTBNPI 33,798* 29,091*     No results for input(s): \"DD\" in the last 168 hours.  No results for input(s): \"SED\", \"CRP\" in the last 168 hours.  Recent Labs   Lab 03/03/25  0631 03/02/25  1606 03/01/25  0809    212 183     No results for input(s): \"TSH\" in the last 168 hours.  No results for input(s): \"COLOR\", \"APPEARANCE\", \"URINEGLC\", \"URINEBILI\", \"URINEKETONE\", \"SG\", \"UBLD\", \"URINEPH\", \"PROTEIN\", \"UROBILINOGEN\", \"NITRITE\", \"LEUKEST\", \"RBCU\", \"WBCU\" in the last 168 hours.    Imaging:  Recent Results (from the past 48 hours)   Chest XR,  PA & LAT    Narrative    EXAM: XR CHEST 2 VIEWS  LOCATION: [facility]  DATE: 3/2/2025    INDICATION: dyspnea  COMPARISON: 3/1/2025.      Impression    IMPRESSION: Cardiac enlargement. Small bilateral pleural effusions with some bibasilar atelectasis. Pulmonary venous hypertension. Findings are those of volume overload/congestive heart failure unchanged from previous.       Echo:  No results found for this or any previous visit (from the past 4320 hours).    Clinically Significant Risk Factors Present on Admission          # Hypochloremia: Lowest Cl = 95 mmol/L in last 2 days, will monitor as appropriate       # Drug Induced Coagulation Defect: home medication list includes an anticoagulant medication    # Hypertension: Noted on problem list  # Acute heart failure with preserved ejection fraction: heart failure noted on problem list, last echo with EF >50%, and receiving IV diuretics     # Anemia: based on hgb <11      # DMII: A1C = 6.5 % (Ref range: <5.7 %) within past 6 months   # Obesity: Estimated body mass index is " "32.77 kg/m  as calculated from the following:    Height as of this encounter: 1.676 m (5' 6\").    Weight as of this encounter: 92.1 kg (203 lb 0.7 oz).       # Financial/Environmental Concerns:                     "

## 2025-03-03 NOTE — PROGRESS NOTES
[facility]    Hospitalist Progress Note    Assessment & Plan   Date of Admission:  3/2/2025        Assessment & Plan  Supriya Herr is a 75 year old female with past medical history of end-stage renal disease on dialysis, type 2 diabetes mellitus, congestive heart failure, peripheral vascular disease, admitted 3/2/2025 for increasing shortness of breath.  Recent hospital discharge from [facility] 2/26/25, see dismissal summary.     Acute heart failure with preserved ejection fraction (HFpEF)  Acute hypoxic respiratory failure  Shortness of breath, anxiety  Small bilateral pleural effusions, pulmonary venous hypertension  Elevated BNP  Elevated troponin  Essential hypertension  History of bradycardia  Chronic anticoagulation, apixaban (Eliquis)   Amiodarone therapy  Recent hospitalization as above for acute hypoxic respiratory failure requiring mechanical ventilation in the setting of influenza A pneumonia, acute COPD exacerbation, hospital-acquired pneumonia, and pharyngeal edema  Hospital admission for shortness of breath in the setting of recent hospital discharge 2/26/2025, see dismissal summary for details.  Complicated hospital stay with acute respiratory failure, pneumonia, acute heart failure, and atrial fibrillation.  History of paroxysmal atrial fibrillation with rapid ventricular response.  Cardiology and electrophysiology consulted on prior hospital stay, required IV amiodarone at that time with transition to oral therapy.  Initial plans for permanent pacemaker on prior hospital stay, later not pursued with no clear indication for placement at this time.  On this visit, ER vitals temperature 98 Fahrenheit, blood pressure 151/51, respiratory rate 18, pulse 69, O2 saturation 97% 3 L nasal cannula  Admission labs WBC 8200, hemoglobin 7.6, platelets 212,000, troponin 64, BNP 33,798, glucose 120, sodium 135, potassium 4.5, creatinine 3.98, anion gap 13  EKG sinus  rhythm, rate 71, nonspecific ST and T wave abnormalities laterally  Chest x-ray with cardiac enlargement, small bilateral pleural effusions with some bibasilar atelectasis, pulmonary venous hypertension  Prior ECHO 1/21/2025 EF 60%, no significant valve disease, see report  Hospital admission for progressive shortness of breath, multifactorial, in the setting of acute on chronic CHF, end-stage renal disease on hemodialysis, atrial fibrillation, and multiple comorbidities.  Nephrology and cardiology consulted.  -Elevated troponin, suspected type II demand ischemia in the setting of acute congestive heart failure and chronic kidney disease with dialysis;   -increase oxygen needs from 3L to 6L since admission.   -Cr 4.7. L 4.4, minimal UO  -feels sob better, no complaints this am. No fever, chills or cough  Rare dysphagia  -lungs clear. Not need oxygen   -pt wanted oxygen for reassurance    -consider mild chf. Volume overload related to esrd, HFpEF  Doubt aspiration. Consider dysrythmia. Has ziopatch on   Anxiety may be playing a role       Plan;   -follow off oxygen,   -Admit inpatient  -Nephrology consult for fluid management, ESRD, and concerns of acute CHF  -Continue furosemide, nephrology to review  -Cardiology consult, recent concerns of CHF, atrial fibrillation, bradycardia  -Telemetry  -Monitor blood pressure, heart rate  -I/O's, daily weights, monitor urine output and serum Cr  -Continue amiodarone, amlodipine,hydralazine   -Continue anticoagulation, apixaban (Eliquis)  -Monitor electrolytes (potassium, magnesium), replace per protocol  -Telemetry  -Oxygen therapy, monitor saturations, titrate as needed; encourage incentive spirometry  -Nutrition consult for diet education, sodium restriction, assess nutritional status; also, PEG tube with ongoing tube feedings, see below  -Pharmacy consult for medication review and reconciliation  -Low sodium, diabetic diet  -1500 ml fluid restriction, nephrology to also  review  -AM labs     End-stage renal disease (ESRD), on dialysis  Chronic kidney disease (CKD), stage V  History of left arm fistula    -likely needs fluid removed. Volume up likely   Plan;   Nephrology consulted for dialysis, fluid balance, assist with medical management  Monitor I's and O's, daily weights, dialysis as per nephrology  AM basic profile daily for now    S/p PEG tube  Gastroesophageal reflux disease  Percutaneous endoscopic gastrostomy (PEG) tube placement prior hospital stay, with concerns of nutrition and swallowing per daughter.  Nutrition service consulted to manage tube feedings  Speech and language therapy (SLP) consulted, evaluate swallow, diet recommendation  Continue pantoprazole  - gerd precautions  - follow symptoms of rare dysphagia for solid foods.   - consider esophagrom before discharge      Hyperlipidemia  Continue atorvastatin     Anemia of chronic disease, end-stage renal disease  Admission hemoglobin 7.6, baseline 7.0-8.0; MCV 99  Monitor hemoglobin  Hb stable 7-8 range. No recent iron labs   Follow-up anemia with outpatient clinic provider, nephrology to review  -consider iron labs     Type II diabetes mellitus  Admission glucose 120  HbA1c 6.5% on 1/17/2025  PTA lantus 15 units bid  BS  range    Plan;   Monitor blood sugars  Insulin sliding scale  Hypoglycemia protocol  Continue prior to admission insulin glargine (Lantus), reduce to 8 units bid and reassess daily   Diabetic diet  Follow for need of prandial aspart  TFs per nutrition      History of anxiety and depression  Continue sertraline  Stop Low dose lorazepam prn,   Consider psychiatry consult for anxiety pending symptoms and clinical course     Gastroesophageal reflux disease  Continue pantoprazole     Obstructive sleep apnea (JONE)  Continue home regimen  Monitor oxygen saturations, encourage incentive spirometry     Weakness and deconditioning  S/p left above-knee amputation, traumatic  PT, OT consulted  Increase  "activity as tolerated  Fall precautions     History of buttock ulcer  Reported on admission, WOC consulted  Wound care, monitor closely     Full code status  Confirmed on admission with patient and daughter; intubated prior hospital stay     Disposition  Anticipated discharge timing see Disposition Plan below and Medically Ready for Discharge link  Anticipated discharge to transitional care unit   Social work consulted for discharge planning    Code Status: Full Code      Medically Ready for Discharge: Anticipated Tomorrow    Clinically Significant Risk Factors Present on Admission         # Hyponatremia: Lowest Na = 134 mmol/L in last 2 days, will monitor as appropriate  # Hypochloremia: Lowest Cl = 95 mmol/L in last 2 days, will monitor as appropriate       # Drug Induced Coagulation Defect: home medication list includes an anticoagulant medication    # Hypertension: Noted on problem list  # Acute heart failure with preserved ejection fraction: heart failure noted on problem list, last echo with EF >50%, and receiving IV diuretics    # Acute Hypoxic Respiratory Failure: Documented O2 saturation < 90%. Continue supplemental oxygen as needed   # Anemia: based on hgb <11      # DMII: A1C = 6.5 % (Ref range: <5.7 %) within past 6 months   # Obesity: Estimated body mass index is 32.77 kg/m  as calculated from the following:    Height as of this encounter: 1.676 m (5' 6\").    Weight as of this encounter: 92.1 kg (203 lb 0.7 oz).       # Financial/Environmental Concerns:         Moderate complex medical decision making.  Greater than 35 minutes spent on patient care including chart review, charting, exam, , care coordination with bedside nurse      Brenden Douglass MD, MD  Text Page  (7am to 6pm)  Interval History   Feels better. No sob this am. Nl sats per rn but pt wanted oxygen on as make her feel better  Sob since yest . Had HD on Friday, helped with sob  Rare dysphagia. No gerd  No cp  Had pain \"all " "over yest\" but gone today.   No complaints currently  Cpap overnight.   Has been anxious    -Data reviewed today: I reviewed all new labs and imaging results over the last 24 hours. I personally reviewed labs and imaging since admission    Physical Exam   Temp: 98  F (36.7  C) Temp src: Oral BP: 117/54 Pulse: 67   Resp: 20 SpO2: 98 % O2 Device: Oxymask Oxygen Delivery: 6 LPM  Vitals:    03/02/25 1333 03/02/25 2209   Weight: 92.1 kg (203 lb) 92.1 kg (203 lb 0.7 oz)     Vital Signs with Ranges  Temp:  [97.5  F (36.4  C)-98.7  F (37.1  C)] 98  F (36.7  C)  Pulse:  [67-75] 67  Resp:  [12-34] 20  BP: (117-152)/(46-86) 117/54  SpO2:  [83 %-100 %] 98 %  I/O last 3 completed shifts:  In: 0   Out: 100 [Urine:100]    Constitutional: In bed, nad  Respiratory: cTAB, breathing easilyi   Heent- nl op   Cardiovascular: RRR no r/g/m, no edema  Chest- ziopatch in place  GI: peg tube site looks fine  Soft, nt, nd  Skin/Integumen: no edema  Ortho -left above-knee amputation noted, nl stump       Medications   Current Facility-Administered Medications   Medication Dose Route Frequency Provider Last Rate Last Admin    Patient is already receiving anticoagulation with heparin, enoxaparin (LOVENOX), warfarin (COUMADIN)  or other anticoagulant medication   Does not apply Continuous PRN Walter Alvarado MD         Current Facility-Administered Medications   Medication Dose Route Frequency Provider Last Rate Last Admin    - MEDICATION INSTRUCTIONS for Dialysis Patients -   Does not apply See Admin Instructions Walter Alvarado MD        amiodarone (PACERONE) tablet 200 mg  200 mg Oral BID Walter Alvarado MD   200 mg at 03/02/25 2358    amLODIPine (NORVASC) tablet 10 mg  10 mg Oral BID Walter Alvarado MD   10 mg at 03/02/25 2357    apixaban ANTICOAGULANT (ELIQUIS) tablet 5 mg  5 mg Oral or Feeding Tube BID Walter Alvarado MD   5 mg at 03/03/25 0013    atorvastatin (LIPITOR) tablet 20 mg  20 mg Oral QPM Walter Alvarado " MD Carlos Manuel   20 mg at 03/02/25 2357    calcitRIOL (ROCALTROL) capsule 0.5 mcg  0.5 mcg Oral Once per day on Monday Wednesday Friday Walter Alvarado MD        cetirizine (zyrTEC) tablet 10 mg  10 mg Oral At Bedtime Walter Alvarado MD   10 mg at 03/02/25 2358    cinacalcet (SENSIPAR) tablet 30 mg  30 mg Oral Once per day on Monday Wednesday Friday Walter Alvarado MD        furosemide (LASIX) tablet 80 mg  80 mg Oral or Feeding Tube Daily Walter Alvarado MD        hydrALAZINE (APRESOLINE) tablet 75 mg  75 mg Oral Q8H Walter Alvarado MD   75 mg at 03/02/25 2358    insulin aspart (NovoLOG) injection (RAPID ACTING)  1-7 Units Subcutaneous TID Brenden Martinez MD        insulin aspart (NovoLOG) injection (RAPID ACTING)  1-5 Units Subcutaneous At Bedtime Brenden Douglass MD        insulin glargine (LANTUS PEN) injection 10 Units  10 Units Subcutaneous BID Brenden Douglass MD        midodrine (PROAMATINE) tablet 5 mg  5 mg Oral Once per day on Monday Wednesday Friday Walter Alvarado MD        pantoprazole (PROTONIX) 2 mg/mL suspension 40 mg  40 mg Per Feeding Tube QAM AC Walter Alvarado MD        sertraline (ZOLOFT) tablet 75 mg  75 mg Oral or Feeding Tube At Bedtime Walter Alvarado MD   75 mg at 03/02/25 2357    sevelamer carbonate (RENVELA) tablet 800 mg  800 mg Oral or Feeding Tube TID w/meals Walter Alvarado MD        sodium chloride (PF) 0.9% PF flush 3 mL  3 mL Intracatheter Q8H Walter Alvarado MD   3 mL at 03/03/25 0456    vitamin D3 (CHOLECALCIFEROL) tablet 50 mcg  50 mcg Oral Daily Walter Alvarado MD           Data   Recent Labs   Lab 03/03/25  0631 03/02/25  2347 03/02/25  1606 03/01/25  0809 02/26/25  0602 02/26/25  0544   WBC 6.0  --  8.2 8.5  --  5.8   HGB 7.4*  --  7.6* 8.0*   < > 7.0*     --  99 101*  --  100     --  212 183  --  201     --  135 134*  --  135   POTASSIUM 4.4  --  4.5 4.4  --  4.4   CHLORIDE 97*  --  95* 97*  --   95*   CO2 23  --  27 27  --  27   BUN 55.9*  --  50.7* 28.7*  --  80.6*   CR 4.76*  --  3.98* 2.45*  --  4.54*   ANIONGAP 15  --  13 10  --  13   JODY 9.5  --  9.8 9.7  --  9.5   GLC 96 93 120* 323*   < > 237*   ALBUMIN  --   --   --   --   --  2.9*    < > = values in this interval not displayed.       Imaging:   Recent Results (from the past 24 hours)   Chest XR,  PA & LAT    Narrative    EXAM: XR CHEST 2 VIEWS  LOCATION: [facility]  DATE: 3/2/2025    INDICATION: dyspnea  COMPARISON: 3/1/2025.      Impression    IMPRESSION: Cardiac enlargement. Small bilateral pleural effusions with some bibasilar atelectasis. Pulmonary venous hypertension. Findings are those of volume overload/congestive heart failure unchanged from previous.

## 2025-03-03 NOTE — PROGRESS NOTES
RECEIVING UNIT ED HANDOFF REVIEW    ED Nurse Handoff Report was reviewed by: Jarrod Saenz RN on March 2, 2025 at 9:43 PM

## 2025-03-04 ENCOUNTER — PATIENT OUTREACH (OUTPATIENT)
Dept: GERIATRIC MEDICINE | Facility: CLINIC | Age: 75
End: 2025-03-04
Payer: COMMERCIAL

## 2025-03-04 ENCOUNTER — APPOINTMENT (OUTPATIENT)
Dept: GENERAL RADIOLOGY | Facility: CLINIC | Age: 75
DRG: 286 | End: 2025-03-04
Attending: INTERNAL MEDICINE
Payer: COMMERCIAL

## 2025-03-04 ENCOUNTER — APPOINTMENT (OUTPATIENT)
Dept: PHYSICAL THERAPY | Facility: CLINIC | Age: 75
DRG: 286 | End: 2025-03-04
Attending: HOSPITALIST
Payer: COMMERCIAL

## 2025-03-04 LAB
GLUCOSE BLDC GLUCOMTR-MCNC: 108 MG/DL (ref 70–99)
GLUCOSE BLDC GLUCOMTR-MCNC: 182 MG/DL (ref 70–99)
GLUCOSE BLDC GLUCOMTR-MCNC: 183 MG/DL (ref 70–99)
GLUCOSE BLDC GLUCOMTR-MCNC: 211 MG/DL (ref 70–99)

## 2025-03-04 PROCEDURE — 250N000012 HC RX MED GY IP 250 OP 636 PS 637: Performed by: INTERNAL MEDICINE

## 2025-03-04 PROCEDURE — 99232 SBSQ HOSP IP/OBS MODERATE 35: CPT | Performed by: INTERNAL MEDICINE

## 2025-03-04 PROCEDURE — 99233 SBSQ HOSP IP/OBS HIGH 50: CPT | Mod: FS | Performed by: NURSE PRACTITIONER

## 2025-03-04 PROCEDURE — 97110 THERAPEUTIC EXERCISES: CPT | Mod: GP

## 2025-03-04 PROCEDURE — 250N000013 HC RX MED GY IP 250 OP 250 PS 637: Performed by: INTERNAL MEDICINE

## 2025-03-04 PROCEDURE — 999N000111 HC STATISTIC OT IP EVAL DEFER: Performed by: OCCUPATIONAL THERAPIST

## 2025-03-04 PROCEDURE — 74220 X-RAY XM ESOPHAGUS 1CNTRST: CPT

## 2025-03-04 PROCEDURE — 250N000013 HC RX MED GY IP 250 OP 250 PS 637: Performed by: HOSPITALIST

## 2025-03-04 PROCEDURE — 97161 PT EVAL LOW COMPLEX 20 MIN: CPT | Mod: GP

## 2025-03-04 PROCEDURE — 210N000002 HC R&B HEART CARE

## 2025-03-04 PROCEDURE — 99233 SBSQ HOSP IP/OBS HIGH 50: CPT | Performed by: INTERNAL MEDICINE

## 2025-03-04 RX ADMIN — HYDROXYZINE HYDROCHLORIDE 25 MG: 25 TABLET ORAL at 00:12

## 2025-03-04 RX ADMIN — CETIRIZINE HYDROCHLORIDE 10 MG: 1 SOLUTION ORAL at 21:50

## 2025-03-04 RX ADMIN — APIXABAN 5 MG: 5 TABLET, FILM COATED ORAL at 09:40

## 2025-03-04 RX ADMIN — HYDRALAZINE HYDROCHLORIDE 75 MG: 50 TABLET ORAL at 17:35

## 2025-03-04 RX ADMIN — SERTRALINE HYDROCHLORIDE 75 MG: 50 TABLET ORAL at 21:50

## 2025-03-04 RX ADMIN — INSULIN GLARGINE 5 UNITS: 100 INJECTION, SOLUTION SUBCUTANEOUS at 09:24

## 2025-03-04 RX ADMIN — HYDRALAZINE HYDROCHLORIDE 75 MG: 50 TABLET ORAL at 00:12

## 2025-03-04 RX ADMIN — Medication 60 ML: at 21:51

## 2025-03-04 RX ADMIN — ATORVASTATIN CALCIUM 20 MG: 20 TABLET, FILM COATED ORAL at 21:50

## 2025-03-04 RX ADMIN — CALCIUM 500 MG: 500 TABLET ORAL at 17:35

## 2025-03-04 RX ADMIN — AMIODARONE HYDROCHLORIDE 200 MG: 200 TABLET ORAL at 09:40

## 2025-03-04 RX ADMIN — AMLODIPINE BESYLATE 10 MG: 10 TABLET ORAL at 21:50

## 2025-03-04 RX ADMIN — CALCIUM 500 MG: 500 TABLET ORAL at 09:40

## 2025-03-04 RX ADMIN — Medication 50 MCG: at 09:40

## 2025-03-04 RX ADMIN — HYDRALAZINE HYDROCHLORIDE 75 MG: 50 TABLET ORAL at 09:40

## 2025-03-04 RX ADMIN — Medication 40 MG: at 07:17

## 2025-03-04 RX ADMIN — AMIODARONE HYDROCHLORIDE 200 MG: 200 TABLET ORAL at 21:50

## 2025-03-04 RX ADMIN — INSULIN ASPART 2 UNITS: 100 INJECTION, SOLUTION INTRAVENOUS; SUBCUTANEOUS at 09:24

## 2025-03-04 RX ADMIN — AMLODIPINE BESYLATE 10 MG: 10 TABLET ORAL at 09:40

## 2025-03-04 RX ADMIN — FUROSEMIDE 80 MG: 10 SOLUTION ORAL at 09:39

## 2025-03-04 RX ADMIN — Medication 60 ML: at 09:44

## 2025-03-04 RX ADMIN — INSULIN ASPART 1 UNITS: 100 INJECTION, SOLUTION INTRAVENOUS; SUBCUTANEOUS at 13:24

## 2025-03-04 ASSESSMENT — ACTIVITIES OF DAILY LIVING (ADL)
ADLS_ACUITY_SCORE: 93
ADLS_ACUITY_SCORE: 93
ADLS_ACUITY_SCORE: 89
ADLS_ACUITY_SCORE: 93
ADLS_ACUITY_SCORE: 93
ADLS_ACUITY_SCORE: 89
ADLS_ACUITY_SCORE: 89
ADLS_ACUITY_SCORE: 88
ADLS_ACUITY_SCORE: 93
ADLS_ACUITY_SCORE: 88
ADLS_ACUITY_SCORE: 93
ADLS_ACUITY_SCORE: 88
ADLS_ACUITY_SCORE: 89
ADLS_ACUITY_SCORE: 89
ADLS_ACUITY_SCORE: 93
ADLS_ACUITY_SCORE: 88
ADLS_ACUITY_SCORE: 89
ADLS_ACUITY_SCORE: 88
ADLS_ACUITY_SCORE: 89

## 2025-03-04 NOTE — PROGRESS NOTES
"[facility]             Assessment/Plan:     ESKD: Was previously a TRS but has switched to MWF at the [facility]. She had one run there on 2/28 (her first run after discharge)   *Dialysis planned for tomorrow    2. SOB/Volume: Does not appear to be clearly linked to volume. She is noting SOB when O2 sats are fine on RA. She requires midodrine and tends to go into RVR on dialysis and develop worsening hypotension. Yesterday was moderately better but assessing her dry weight based upon these physiologic responses on dialysis is challenging  *consider Right heart cath. If elevated then we will do a daily challenge here.     3. Anemia: Hgb stable at 7.4. Iron was low on 2/11 and she received Venofer. She had been receiving EPO while in hospital 2/2025.     4. Bone/Mineral: Phos is 5. Cont therapy with binders.     Attestation:   I have reviewed today's relevant vital signs, notes, medications, labs and imaging. 45 min was spent reviewing this data and conveying plan to the team including in person communication with Dr. Douglass.     Alysia Roman MD MS   [facility]      Interval History:     PT ran yesterday with no complications. She was irregular when I saw her on dialysis but is regular this am. O2 sats have been in the mid to high 90's on RA. She continues to complain of SOB with even rolling in bed although currently appears comfortable and conversant. Pulled 2.L yesterday. BP\"s came down into the 120's. Today BP's are in the 140's.    No nausea. No vomiting. No chest pain. No chest pressure. Cont to have SOB. No cough. No fevers          Data:     CBC RESULTS:     Recent Labs   Lab 03/03/25  0631 03/02/25  1606 03/01/25  0809 02/26/25  0844 02/26/25  0544   WBC 6.0 8.2 8.5  --  5.8   RBC 2.43* 2.54* 2.63*  --  2.31*   HGB 7.4* 7.6* 8.0* 7.2* 7.0*   HCT 24.2* 25.1* 26.6*  --  23.2*    212 183  --  201     Basic Metabolic Panel:  Recent Labs   Lab 03/04/25  0835 " "03/04/25  0205 03/03/25  2121 03/03/25  1818 03/03/25  1334 03/03/25  0631 03/02/25  2347 03/02/25  1606 03/01/25  0809 02/26/25  0602 02/26/25  0544   NA  --   --   --   --   --  135  --  135 134*  --  135   POTASSIUM  --   --   --   --   --  4.4  --  4.5 4.4  --  4.4   CHLORIDE  --   --   --   --   --  97*  --  95* 97*  --  95*   CO2  --   --   --   --   --  23  --  27 27  --  27   BUN  --   --   --   --   --  55.9*  --  50.7* 28.7*  --  80.6*   CR  --   --   --   --   --  4.76*  --  3.98* 2.45*  --  4.54*   * 183* 164* 111* 77 96   < > 120* 323*   < > 237*   JODY  --   --   --   --   --  9.5  --  9.8 9.7  --  9.5    < > = values in this interval not displayed.     INRNo lab results found in last 7 days.          Physical Exam:     Vitals were reviewed     , Blood pressure (!) 148/49, pulse 70, temperature 98.1  F (36.7  C), temperature source Oral, resp. rate 18, height 1.676 m (5' 6\"), weight 90.2 kg (198 lb 13.7 oz), SpO2 98%, not currently breastfeeding.  Wt Readings from Last 3 Encounters:   03/03/25 90.2 kg (198 lb 13.7 oz)   02/26/25 92.5 kg (203 lb 14.8 oz)   06/14/24 101.1 kg (222 lb 14.2 oz)     Intake/Output Summary (Last 24 hours) at 3/4/2025 1052  Last data filed at 3/4/2025 0700  Gross per 24 hour   Intake 1690 ml   Output 2000 ml   Net -310 ml     Gen; Comfortable. NAD. Alert and oriented X 3  Eyes; pupils are reactive. Non icteric  Mouth: no oral lesions  Neck: supple with no adenopathy  Resp: CTA anterior  and clear on the right. Diminished on the L  Card: regular with ?soft murmur apex   Abd: soft,  non tender. Good bowel sounds  Ext: no edema L AKA  Neuro: no asterixis or myoclonus  Skin; no rashes or lesions           Medications and Allergies:     Current Facility-Administered Medications   Medication Dose Route Frequency Provider Last Rate Last Admin    - MEDICATION INSTRUCTIONS for Dialysis Patients -   Does not apply See Admin Instructions Walter Alvarado MD        amiodarone " (PACERONE) tablet 200 mg  200 mg Oral or Feeding Tube BID Brenden Douglass MD   200 mg at 03/04/25 0940    amLODIPine (NORVASC) tablet 10 mg  10 mg Oral or Feeding Tube BID Brenden Douglass MD   10 mg at 03/04/25 0940    apixaban ANTICOAGULANT (ELIQUIS) tablet 5 mg  5 mg Oral or Feeding Tube BID Brenden Douglass MD   5 mg at 03/04/25 0940    atorvastatin (LIPITOR) tablet 20 mg  20 mg Oral or Feeding Tube QPM Brenden Douglass MD   20 mg at 03/03/25 2045    [START ON 3/5/2025] calcitRIOL (ROCALTROL) capsule 0.5 mcg  0.5 mcg Oral or Feeding Tube Once per day on Monday Wednesday Friday Brenden Douglass MD        calcium carbonate 500 mg (elemental) (OSCAL) tablet 500 mg  500 mg Oral or Feeding Tube TID w/meals Brenden Douglass MD   500 mg at 03/04/25 0940    cetirizine (zyrTEC) solution 10 mg  10 mg Oral or Feeding Tube At Bedtime Brneden Douglass MD   10 mg at 03/03/25 2046    cinacalcet (SENSIPAR) tablet 30 mg  30 mg Oral Once per day on Monday Wednesday Friday Walter Alvarado MD   30 mg at 03/03/25 1412    furosemide (LASIX) solution 80 mg  80 mg Oral or Feeding Tube Daily Brenden Douglass MD   80 mg at 03/04/25 0939    hydrALAZINE (APRESOLINE) tablet 75 mg  75 mg Oral or Feeding Tube Q8H Brenden Douglass MD   75 mg at 03/04/25 0940    insulin aspart (NovoLOG) injection (RAPID ACTING)  1-7 Units Subcutaneous TID AC Brenden Douglass MD   2 Units at 03/04/25 0924    insulin aspart (NovoLOG) injection (RAPID ACTING)  1-5 Units Subcutaneous At Bedtime Brenden Douglass MD        insulin glargine (LANTUS PEN) injection 5 Units  5 Units Subcutaneous BID Brenden Douglass MD   5 Units at 03/04/25 0924    [START ON 3/5/2025] midodrine (PROAMATINE) tablet 5 mg  5 mg Oral or Feeding Tube Once per day on Monday Wednesday Friday Brenden Douglass MD        pantoprazole (PROTONIX) 2 mg/mL suspension 40 mg  40 mg Per Feeding Tube [facility] Walter Alvarado MD   40 mg at 03/04/25 0717    [facility] TF20  ENfit Compatibl EN LIQD (PROSOURCE TF20) packet 60 mL  1 packet Per Feeding Tube BID Walter Alvarado MD   60 mL at 03/04/25 0944    sertraline (ZOLOFT) tablet 75 mg  75 mg Oral or Feeding Tube At Bedtime Brenden Douglass MD   75 mg at 03/03/25 2046    sodium chloride (PF) 0.9% PF flush 3 mL  3 mL Intracatheter Q8H Walter Alvarado MD   3 mL at 03/04/25 0925    sodium chloride 0.9% BOLUS 500 mL  500 mL Hemodialysis Machine Once Alysia Roman MD        vitamin D3 (CHOLECALCIFEROL) tablet 50 mcg  50 mcg Oral or Feeding Tube Daily Brenden Douglass MD   50 mcg at 03/04/25 0940     Allergies   Allergen Reactions    Ampicillin-Sulbactam Sodium Rash     No evidence SJS, but very uncomfortable and precipitated multiple provider visits. Would not use penicillins again if other options available.     Penicillins Rash

## 2025-03-04 NOTE — PROGRESS NOTES
[facility]    Hospitalist Progress Note    Assessment & Plan   Date of Admission:  3/2/2025        Assessment & Plan  Supriya Herr is a 75 year old female with past medical history of end-stage renal disease on dialysis, type 2 diabetes mellitus, congestive heart failure, peripheral vascular disease, admitted 3/2/2025 for increasing shortness of breath.  Recent hospital discharge from [facility] 2/26/25, see dismissal summary.     Acute heart failure with preserved ejection fraction (HFpEF)  Acute hypoxic respiratory failure  Shortness of breath, anxiety  Small bilateral pleural effusions, pulmonary venous hypertension  Elevated BNP  Elevated troponin  Essential hypertension  History of bradycardia  Chronic anticoagulation, apixaban (Eliquis)   Amiodarone therapy  Recent hospitalization as above for acute hypoxic respiratory failure requiring mechanical ventilation in the setting of influenza A pneumonia, acute COPD exacerbation, hospital-acquired pneumonia, and pharyngeal edema  Hospital admission for shortness of breath in the setting of recent hospital discharge 2/26/2025, see dismissal summary for details.  Complicated hospital stay with acute respiratory failure, pneumonia, acute heart failure, and atrial fibrillation.  History of paroxysmal atrial fibrillation with rapid ventricular response.  Cardiology and electrophysiology consulted on prior hospital stay, required IV amiodarone at that time with transition to oral therapy.  Initial plans for permanent pacemaker on prior hospital stay, later not pursued with no clear indication for placement at this time.  On this visit, ER vitals temperature 98 Fahrenheit, blood pressure 151/51, respiratory rate 18, pulse 69, O2 saturation 97% 3 L nasal cannula  Admission labs WBC 8200, hemoglobin 7.6, platelets 212,000, troponin 64, BNP 33,798, glucose 120, sodium 135, potassium 4.5, creatinine 3.98, anion gap 13  EKG sinus  rhythm, rate 71, nonspecific ST and T wave abnormalities laterally  Chest x-ray with cardiac enlargement, small bilateral pleural effusions with some bibasilar atelectasis, pulmonary venous hypertension  Prior ECHO 1/21/2025 EF 60%, no significant valve disease, see report  Hospital admission for progressive shortness of breath, multifactorial, in the setting of acute on chronic CHF, end-stage renal disease on hemodialysis, atrial fibrillation, and multiple comorbidities.  Nephrology and cardiology consulted.  -Elevated troponin, suspected type II demand ischemia in the setting of acute congestive heart failure and chronic kidney disease with dialysis;   -chf. Volume overload related to esrd, HFpEF  Doubt aspiration. Consider dysrythmia. Has ziopatch on   Anxiety may be playing a role  -Weight down to 90.2.  Had dialysis with fluid removal on 3/3.  -Still with some shortness of breath.  While patient appears euvolemic may still be volume up accounting for her subjective shortness of breath.  Has not tolerated more aggressive fluid removal in the past with associated hypotension and A-fib with RVR.    -Discussed with nephrology and cardiology.  Will proceed with right heart cath on 3/5 to determine if she remains volume up and whether a lower dry weight should be pursued on dialysis.  Updated patient's daughter.  Patient is agreeable.       Plan;   -N.p.o. at midnight for right heart cath tomorrow  -Cardiology to put in right heart cath orders.  Input appreciated  -No longer requiring oxygen  -Nephrology consult and following.  Appreciate input  -Continue furosemide,  -Cardiology consulted and following.  -Telemetry  -Monitor blood pressure, heart rate  -I/O's, daily weights, monitor urine output and serum Cr  -Continue amiodarone, amlodipine,hydralazine   -Continue anticoagulation, apixaban (Eliquis)-hold for right heart cath on 3/5   -1500 cc fluid restriction  -AM labs     End-stage renal disease (ESRD), on  dialysis  Chronic kidney disease (CKD), stage V  History of left arm fistula    Hemodialysis on3/3 with fluid off, weight down.  Less shortness of breath today but still with subjective shortness of breath possibly related to persistent volume.  On exam appears euvolemic  Plan;   -Right heart cath tomorrow to further evaluate volume status and whether to pursue more aggressive dry weight on dialysis.  Nephrology consulted for dialysis, fluid balance, assist with medical management  Monitor I's and O's, daily weights, dialysis as per nephrology  AM basic profile daily for now    S/p PEG tube  Gastroesophageal reflux disease  Vague dysphagia intermittent oral pharynx upper esophagus  Percutaneous endoscopic gastrostomy (PEG) tube placement prior hospital stay, with concerns of nutrition and swallowing per daughter.  Nutrition service consulted to manage tube feedings  Speech and language therapy (SLP) consulted, evaluate swallow, diet recommendation  Continue pantoprazole  - gerd precautions  - follow symptoms of rare dysphagia for solid foods.   -Esophagram today to further evaluate     Hyperlipidemia  Continue atorvastatin     Anemia of chronic disease, end-stage renal disease  Admission hemoglobin 7.6, baseline 7.0-8.0; MCV 99  Monitor hemoglobin  Hb stable 7-8 range. No recent iron labs   Follow-up anemia with outpatient clinic provider, nephrology to review  -EPO per nephrology     Type II diabetes mellitus  Admission glucose 120  HbA1c 6.5% on 1/17/2025  PTA lantus 15 units bid  BS blood sugars 180 range.  On nocturnal tube feed    Plan;   -N.p.o. at midnight.  Hold Lantus  Monitor blood sugars  Insulin sliding scale  Hypoglycemia protocol  Increase Lantus 8 units twice daily-hold at bedtime given n.p.o. status  Diabetic diet  Follow for need of prandial aspart  Nocturnal TFs per nutrition      History of anxiety and depression  Continue sertraline  Stop Low dose lorazepam prn,   Consider psychiatry consult for  "anxiety pending symptoms and clinical course     Gastroesophageal reflux disease  Continue pantoprazole     Obstructive sleep apnea (JONE)  Continue home regimen  Monitor oxygen saturations, encourage incentive spirometry     Weakness and deconditioning  S/p left above-knee amputation, traumatic  PT, OT consulted  Increase activity as tolerated  Fall precautions     Pressure Injury Stage: either Stage 2 or 3   Reported on admission, WOC consulted  Wound care treatment per wound care, monitor closely     Full code status  Confirmed on admission with patient and daughter; intubated prior hospital stay     Disposition  Anticipated discharge timing see Disposition Plan below and Medically Ready for Discharge link  Anticipated discharge to transitional care unit   Social work consulted for discharge planning    Code Status: Full Code    Medically Ready for Discharge: Anticipated in 2-4 Days pending results of right heart cath and further nephrology and cardiology evaluation.  Likely on 3/6 to nursing home    Clinically Significant Risk Factors          # Hypochloremia: Lowest Cl = 95 mmol/L in last 2 days, will monitor as appropriate            # Hypertension: Noted on problem list  # Acute heart failure with preserved ejection fraction: heart failure noted on problem list, last echo with EF >50%, and receiving IV diuretics          # DMII: A1C = 6.5 % (Ref range: <5.7 %) within past 6 months   # Obesity: Estimated body mass index is 32.1 kg/m  as calculated from the following:    Height as of this encounter: 1.676 m (5' 6\").    Weight as of this encounter: 90.2 kg (198 lb 13.7 oz)., PRESENT ON ADMISSION       # Financial/Environmental Concerns: none       Moderate complex medical decision making.  Greater than 50 minutes spent on patient care including chart review, charting, exam, , care coordination with bedside nurse, cardiology, nephrology, family, bedside nurse      Brenden Douglass MD, MD  Text Page  " (7am to 6pm)  Interval History   Shortness of breath better following dialysis yesterday.  Still with some shortness of breath.  Patient prefers to have oxygen on as it makes her feel less short of breath.  Normal oxygen saturations this morning at rest per nurse    -Data reviewed today: I reviewed all new labs and imaging results over the last 24 hours. I personally reviewed labs and imaging since admission    Physical Exam   Temp: 98.4  F (36.9  C) Temp src: Oral BP: (!) 154/47 Pulse: 70   Resp: 18 SpO2: 98 % O2 Device: None (Room air) Oxygen Delivery: 4 LPM  Vitals:    03/02/25 1333 03/02/25 2209 03/03/25 1333   Weight: 92.1 kg (203 lb) 92.1 kg (203 lb 0.7 oz) 90.2 kg (198 lb 13.7 oz)     Vital Signs with Ranges  Temp:  [98.1  F (36.7  C)-98.9  F (37.2  C)] 98.4  F (36.9  C)  Pulse:  [70-76] 70  Resp:  [18-20] 18  BP: (139-154)/(40-49) 154/47  SpO2:  [97 %-100 %] 98 %  I/O last 3 completed shifts:  In: 1130 [P.O.:360; NG/GT:350]  Out: 2000 [Other:2000]    Constitutional: In bed, nad  Respiratory: cTAB, breathing easily  Cardiovascular: RRR no r/g/m, no edema  Chest- ziopatch in place  GI: peg tube -normal site  Soft, nt, nd  Skin/Integumen: no edema  Ortho -left above-knee amputation noted, nl stump       Medications   Current Facility-Administered Medications   Medication Dose Route Frequency Provider Last Rate Last Admin    Patient is already receiving anticoagulation with heparin, enoxaparin (LOVENOX), warfarin (COUMADIN)  or other anticoagulant medication   Does not apply Continuous PRN Walter Alvarado MD         Current Facility-Administered Medications   Medication Dose Route Frequency Provider Last Rate Last Admin    - MEDICATION INSTRUCTIONS for Dialysis Patients -   Does not apply See Admin Instructions Walter Alvarado MD        amiodarone (PACERONE) tablet 200 mg  200 mg Oral or Feeding Tube BID Brenden Douglass MD   200 mg at 03/04/25 0940    amLODIPine (NORVASC) tablet 10 mg  10 mg Oral or  Feeding Tube BID Brenden Douglass MD   10 mg at 03/04/25 0940    [Held by provider] apixaban ANTICOAGULANT (ELIQUIS) tablet 5 mg  5 mg Oral or Feeding Tube BID Brenden Douglass MD   5 mg at 03/04/25 0940    atorvastatin (LIPITOR) tablet 20 mg  20 mg Oral or Feeding Tube QPM Brenden Douglass MD   20 mg at 03/03/25 2045    [START ON 3/5/2025] calcitRIOL (ROCALTROL) capsule 0.5 mcg  0.5 mcg Oral or Feeding Tube Once per day on Monday Wednesday Friday Brenden Douglass MD        calcium carbonate 500 mg (elemental) (OSCAL) tablet 500 mg  500 mg Oral or Feeding Tube TID w/meals Brenden Douglass MD   500 mg at 03/04/25 0940    cetirizine (zyrTEC) solution 10 mg  10 mg Oral or Feeding Tube At Bedtime Brenden Douglass MD   10 mg at 03/03/25 2046    cinacalcet (SENSIPAR) tablet 30 mg  30 mg Oral Once per day on Monday Wednesday Friday Walter Alvarado MD   30 mg at 03/03/25 1412    furosemide (LASIX) solution 80 mg  80 mg Oral or Feeding Tube Daily Brenden Douglass MD   80 mg at 03/04/25 0939    hydrALAZINE (APRESOLINE) tablet 75 mg  75 mg Oral or Feeding Tube Q8H Brenden Douglass MD   75 mg at 03/04/25 0940    insulin aspart (NovoLOG) injection (RAPID ACTING)  1-7 Units Subcutaneous TID AC Brenden Douglass MD   1 Units at 03/04/25 1324    insulin aspart (NovoLOG) injection (RAPID ACTING)  1-5 Units Subcutaneous At Bedtime Brenden Douglass MD        [Held by provider] insulin glargine (LANTUS PEN) injection 5 Units  5 Units Subcutaneous BID Brenden Douglass MD   5 Units at 03/04/25 0924    [START ON 3/5/2025] midodrine (PROAMATINE) tablet 5 mg  5 mg Oral or Feeding Tube Once per day on Monday Wednesday Friday Brenden Douglass MD        pantoprazole (PROTONIX) 2 mg/mL suspension 40 mg  40 mg Per Feeding Tube QAM AC Walter Alvarado MD   40 mg at 03/04/25 0717    Prosource TF20 ENfit Compatibl EN LIQD (PROSOURCE TF20) packet 60 mL  1 packet Per Feeding Tube BID Walter Alvarado MD   60 mL at  03/04/25 0944    sertraline (ZOLOFT) tablet 75 mg  75 mg Oral or Feeding Tube At Bedtime Brenden Douglass MD   75 mg at 03/03/25 2046    sodium chloride (PF) 0.9% PF flush 3 mL  3 mL Intracatheter Q8H Walter Alvarado MD   3 mL at 03/04/25 0925    sodium chloride 0.9% BOLUS 500 mL  500 mL Hemodialysis Machine Once Alysia Roman MD        vitamin D3 (CHOLECALCIFEROL) tablet 50 mcg  50 mcg Oral or Feeding Tube Daily Brenden Douglass MD   50 mcg at 03/04/25 0940       Data   Recent Labs   Lab 03/04/25  1311 03/04/25  0835 03/04/25  0205 03/03/25  1334 03/03/25  0631 03/02/25  2347 03/02/25  1606 03/01/25  0809 02/26/25  0602 02/26/25  0544   WBC  --   --   --   --  6.0  --  8.2 8.5  --  5.8   HGB  --   --   --   --  7.4*  --  7.6* 8.0*   < > 7.0*   MCV  --   --   --   --  100  --  99 101*  --  100   PLT  --   --   --   --  184  --  212 183  --  201   NA  --   --   --   --  135  --  135 134*  --  135   POTASSIUM  --   --   --   --  4.4  --  4.5 4.4  --  4.4   CHLORIDE  --   --   --   --  97*  --  95* 97*  --  95*   CO2  --   --   --   --  23  --  27 27  --  27   BUN  --   --   --   --  55.9*  --  50.7* 28.7*  --  80.6*   CR  --   --   --   --  4.76*  --  3.98* 2.45*  --  4.54*   ANIONGAP  --   --   --   --  15  --  13 10  --  13   JODY  --   --   --   --  9.5  --  9.8 9.7  --  9.5   * 211* 183*   < > 96   < > 120* 323*   < > 237*   ALBUMIN  --   --   --   --   --   --   --   --   --  2.9*    < > = values in this interval not displayed.       Imaging:   No results found for this or any previous visit (from the past 24 hours).
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
## 2025-03-04 NOTE — PLAN OF CARE
Goal Outcome Evaluation:      Plan of Care Reviewed With: patient, child    Overall Patient Progress: no changeOverall Patient Progress: no change  Heart Failure Care Map  GOALS TO BE MET BEFORE DISCHARGE:    1. Decrease congestion and/or edema with diuretic therapy to achieve near optimal volume status.     Dyspnea improved: No, further care required to meet this goal. Please explain 2L NC with saturations above 95% at all times, pt reports anxiety   Edema improved: Yes, satisfactory for discharge.        Last 24 hour I/O:   Intake/Output Summary (Last 24 hours) at 3/3/2025 2355  Last data filed at 3/3/2025 2300  Gross per 24 hour   Intake 1060 ml   Output 2100 ml   Net -1040 ml           Net I/O and Weights since admission:   02/02 0700 - 03/04 0659  In: 1060 [P.O.:360]  Out: 2100 [Urine:100]  Net: -1040     Vitals:    03/02/25 1333 03/02/25 2209 03/03/25 1333   Weight: 92.1 kg (203 lb) 92.1 kg (203 lb 0.7 oz) 90.2 kg (198 lb 13.7 oz)       2.  O2 sats > 90% on room air, or at prior home O2 therapy level.      Able to wean O2 this shift to keep sats above 90%?: No, further care required to meet this goal. Please explain 2 L for SOB/anxiety   Does patient use Home O2? No          Current oxygenation status:   SpO2: 100 %     O2 Device: Nasal cannula, Oxygen Delivery: 2 LPM    3.  Tolerates ambulation and mobility near baseline.     Ambulation: No, further care required to meet this goal. Please explain lift to chair, attempt with sloan steady but pt not ready, therapies following   Times patient ambulated with staff this shift: 2 to chair and return to bed    Please review the Heart Failure Care Map for additional HF goal outcomes.    Jalyn Dunaway, RN  3/3/2025   Neuro:anxious, up to chair and reports hallucinations of cat running across room, pt and daughter report very little sleep  CV/Rhythm:SR  Resp/02:2 L NC, SOB  GI/Diet:thin liq, renal mod carb diet, TF started at 1900 at goal rate of 70ml, has sm BM  incont  :HD today, removed 2 L, pure wick in place while in bed, no UOP noted  Skin/Incisions/Sites:L stump dry, flaky, abhijit, shiny RLE, buttocks abrasion with coccynx wound - WOC following, up to chair x 1.5 hrs on chair cushion, turned q2hr R and L, R heel elevated, HOB 30 degrees, mepilex intact to coccynx  Pulses/CMS:intact  Edema:trace RLE 1+  Activity/Falls Risk:fall risk, lift to chair, attempt to use sloan steady but pt unable to sit on side of bed with 2 assist  Lines/Drains/IVs:PEG, PIV, fistula LUE  Labs/BGM:, 164  Test/Procedures:HD today  VS/Pain:HTN, GT meds, no pain - report anxiety - melatonin for sleep  DC Plan:? Daughter would like to talk to social work as she does not feel TCU could do the HD, HF diets/checks, sticky note for MD/ social work to call Caroline Gray  Other:daughter at bedside, nutrition called to clarify restart TF at goal of 70cc

## 2025-03-04 NOTE — PLAN OF CARE
Goal Outcome Evaluation:  -~Care plan-end of shift note:    -~Orientation/Mentation:A&O x4, anxious, forgetful, reoriented, gave atarax, calm techniques, reassurance   -~VS: VSS, used CPAP overnight-tolerated for 30 minutes,declined further use, Oxymask overight per preference.   -~LS/Pulm:Ls diminished   -~Tele/Cardiac:SR  -~GI:WDL ex obese, GT in place, medium brown stool x1   -~:external catheter in place, goes to dialysis, L upper arm AV fistula +thrill/bruit   -~Pain:denies   -~Mobility:up w/2A/lift   -~Skin:scattered bruises/scabs, coccyx-buttocks wounds, L AKA, RLE shin/foot discoloration   -~Diet:Mod CHO, 1500 ML FR, nocturnal TF, off this am   -~Lines/IVs:PIV SL   -~Safety/Concern:Fall risk   -~Aggression color:green   -~Plan/Shift summary/Goals:denies CP, DEVRIES , encouraged pulmonary hygiene. Plans for TCU at discharge, SW following

## 2025-03-04 NOTE — PLAN OF CARE
Occupational Therapy: Orders received. Chart reviewed and discussed with care team.? Occupational Therapy not indicated due to pt has assist w/ all ADLs/IADLs at baseline. IP therapy needs being met by PT for functional mobility training.? Defer discharge recommendations to PT and medical team.? Will complete orders.

## 2025-03-04 NOTE — PLAN OF CARE
Goal Outcome Evaluation:    VSS. Monitor shows Sinus rhythm. Pt. Is alert and oriented with intermittent confusion. Pt. Has PEG tube. Pt. Sent for Esophogram. Plan for right heart cath tomorrow. NPO after midnight. Patient care order to hold TF at  0030 for right heart cath.

## 2025-03-04 NOTE — PROGRESS NOTES
03/04/25 1137   Appointment Info   Signing Clinician's Name / Credentials (PT) Jennifer Jose, PT, DPT   Living Environment   People in Home child(dominick), adult   Current Living Arrangements house   Home Accessibility wheelchair accessible   Transportation Anticipated family or friend will provide   Living Environment Comments Prior to this hospital admission pt was at TCU for rehab. At baseline, pt lives with daughter and 11 year old grandson in w/c accessible house   Self-Care   Usual Activity Tolerance fair   Current Activity Tolerance poor   Regular Exercise No   Equipment Currently Used at Home wheelchair, manual   Fall history within last six months no   Activity/Exercise/Self-Care Comment At baseline pt has assist as needed with ADLs, is able to pivot transfer to manual w/c independently   General Information   Onset of Illness/Injury or Date of Surgery 03/02/25   Referring Physician Walter Alvarado MD   Patient/Family Therapy Goals Statement (PT) Pt states her ultimate goal is to go back home and watch her grandson grow up   Pertinent History of Current Problem (include personal factors and/or comorbidities that impact the POC) Pt is 75 year old female adm on 3/2/25 from TCU with shortness of breath.  Plan is for right heart cath during this hospital stay. Pt was recently hospitalized in January 8, 2025- 2/26/25 with prolonged period of mechanical ventilation due to influenza A. PMH includes ESRD on HD, CHF, COPD, L AKA, w/c bound, DM type 2, HTN   Existing Precautions/Restrictions fall   Cognition   Affect/Mental Status (Cognition) WFL   Orientation Status (Cognition) oriented x 3   Follows Commands (Cognition) WFL   Pain Assessment   Patient Currently in Pain No   Integumentary/Edema   Integumentary/Edema no deficits were identifed   Posture    Posture Forward head position   Range of Motion (ROM)   Range of Motion ROM is WFL   Strength (Manual Muscle Testing)   Strength (Manual Muscle Testing)  Deficits observed during functional mobility   Strength Comments Pt is generally deconditioned but can perform SLR on R LE   Bed Mobility   Comment, (Bed Mobility) Pt total assist with rolling at this time, declines further mobility due to SOB with this activity   Transfers   Comment, (Transfers) Currently using lift with nursing for transfer OOB to chair   Gait/Stairs (Locomotion)   Comment, (Gait/Stairs) Pt is w/c bound at baseline, does not ambulate   Balance   Balance Comments Fair   Sensory Examination   Sensory Perception patient reports no sensory changes   Clinical Impression   Criteria for Skilled Therapeutic Intervention Yes, treatment indicated   PT Diagnosis (PT) Impaired mobility   Influenced by the following impairments Decreased strength, decreased balance, decreased activity tolerance   Functional limitations due to impairments Decreased independence with functional mobility tasks   Clinical Presentation (PT Evaluation Complexity) evolving   Clinical Presentation Rationale Current presentation, [facility]   Clinical Decision Making (Complexity) low complexity   Planned Therapy Interventions (PT) balance training;bed mobility training;home exercise program;patient/family education;strengthening;transfer training;wheelchair management/propulsion training   Risk & Benefits of therapy have been explained evaluation/treatment results reviewed;care plan/treatment goals reviewed;risks/benefits reviewed;current/potential barriers reviewed;participants voiced agreement with care plan;participants included;patient   PT Total Evaluation Time   PT Eval, Low Complexity Minutes (57579) 10   Physical Therapy Goals   PT Frequency 3x/week  (non-dialysis days, increase frequency if tolerance increases)   PT Predicted Duration/Target Date for Goal Attainment 03/22/25   PT: Bed Mobility Supervision/stand-by assist;Supine to/from sit;Rolling   PT: Transfers Minimal assist;Bed to/from chair   PT: Perform aerobic activity with  stable cardiovascular response continuous activity;10 minutes  (w/c propulsion)   Therapeutic Procedure/Exercise   Ther. Procedure: strength, endurance, ROM, flexibillity Minutes (92658) 10   Symptoms Noted During/After Treatment fatigue;shortness of breath   Treatment Detail/Skilled Intervention Pt completes 10 reps ankle pumps, SLR, SAQ on R LE, performs ROM on L LE. Pt unable to tolerate further activity at this time due to SOB.   PT Discharge Planning   PT Plan General strengthening, increase activity as kamar, transfers to chair and w/c mobility as able   PT Discharge Recommendation (DC Rec) Transitional Care Facility   PT Rationale for DC Rec Pt continues to be below baseline level of function, anticipate need for continued inpatient rehab at TCU when  medically stable for discharge.   PT Brief overview of current status Goals of therapy will be to address safe mobility and make recs for d/c to next level of care. Pt and RN will continue to follow all falls risk precautions as documented by RN staff while hospitalized   PT Total Distance Amb During Session (feet) 0   Physical Therapy Time and Intention   Timed Code Treatment Minutes 10   Total Session Time (sum of timed and untimed services) 20

## 2025-03-04 NOTE — PROGRESS NOTES
Care Management Follow Up    Length of Stay (days): 2    Expected Discharge Date: 03/07/2025     Concerns to be Addressed:       Patient plan of care discussed at interdisciplinary rounds: Yes    Anticipated Discharge Disposition:                Anticipated Discharge Services:    Anticipated Discharge DME:      Patient/family educated on Medicare website which has current facility and service quality ratings:    Education Provided on the Discharge Plan:    Patient/Family in Agreement with the Plan:      Referrals Placed by CM/SW:    Private pay costs discussed: Not applicable    Discussed  Partnership in Safe Discharge Planning  document with patient/family: No     Handoff Completed: Yes, [facility] PCP: Internal handoff referral completed    Additional Information:    SW notified by hospitalist that patients daughter, Caroline Gray, would like to speak to SW. SW called Caroline Gray and discusses [facility]. Caroline Gray reports patient was sent to dialysis with no coat, no shoe, and thw wrong renetta lift. States concerns that patient was not being checked on or turned enough, and the feeding tube machine was not working upon arrival. Caroline Gray had questions regarding facility standards and how often they are to be turning her and checking her oxygen. Caroline Gray would also like referrals sent to [facility] and [facility], although she is not closed off to having her mom return to [facility] if she is able to get some of her questions answered.     SW offered to call YOKAST[facility] at [facility] to see if any of those questions could be answered. SW left VM for YOKASTA Saenz, requesting call back. SW sent additional referrals.     Add: HIGINIO has yet to hear back from [facility]. Sent referrals to [facility] and [facility] as requested by pt and daughter.    Next Steps: continue to follow for discharge planning    MARK Washington

## 2025-03-04 NOTE — CONSULTS
Consult acknowledged. Already completed on 3/03. Care management is continuing to follow for discharge planning.

## 2025-03-04 NOTE — PROGRESS NOTES
[facility] Care Coordination Contact    Received a request to submit a DTR for the terminated of [employer] consultation services and EW. Documentation completed and faxed to the health plan. Care Coordinator aware.    Abbi Gomez RN  Utilization   [facility]  [phone]

## 2025-03-05 DIAGNOSIS — E11.3213 TYPE 2 DIABETES MELLITUS WITH MILD NONPROLIFERATIVE RETINOPATHY OF BOTH EYES AND MACULAR EDEMA, UNSPECIFIED WHETHER LONG TERM INSULIN USE (H): Primary | ICD-10-CM

## 2025-03-05 LAB
BASE EXCESS BLDV CALC-SCNC: 4 MMOL/L (ref -3–3)
GLUCOSE BLDC GLUCOMTR-MCNC: 139 MG/DL (ref 70–99)
GLUCOSE BLDC GLUCOMTR-MCNC: 184 MG/DL (ref 70–99)
GLUCOSE BLDC GLUCOMTR-MCNC: 197 MG/DL (ref 70–99)
GLUCOSE BLDC GLUCOMTR-MCNC: 204 MG/DL (ref 70–99)
GLUCOSE BLDC GLUCOMTR-MCNC: 91 MG/DL (ref 70–99)
HBV SURFACE AG SERPL QL IA: NONREACTIVE
HCO3 BLDV-SCNC: 30 MMOL/L (ref 21–28)
LACTATE BLD-SCNC: <0.3 MMOL/L
PCO2 BLDV: 54 MM HG (ref 40–50)
PH BLDV: 7.35 [PH] (ref 7.32–7.43)
PO2 BLDV: 38 MM HG (ref 25–47)
POTASSIUM SERPL-SCNC: 4.3 MMOL/L (ref 3.4–5.3)
SAO2 % BLDV: 67 % (ref 70–75)

## 2025-03-05 PROCEDURE — C1751 CATH, INF, PER/CENT/MIDLINE: HCPCS | Performed by: INTERNAL MEDICINE

## 2025-03-05 PROCEDURE — 82803 BLOOD GASES ANY COMBINATION: CPT

## 2025-03-05 PROCEDURE — 2022F DILAT RTA XM EVC RTNOPTHY: CPT | Performed by: OPHTHALMOLOGY

## 2025-03-05 PROCEDURE — 258N000003 HC RX IP 258 OP 636: Performed by: INTERNAL MEDICINE

## 2025-03-05 PROCEDURE — 93451 RIGHT HEART CATH: CPT | Mod: 26 | Performed by: INTERNAL MEDICINE

## 2025-03-05 PROCEDURE — 87340 HEPATITIS B SURFACE AG IA: CPT | Performed by: INTERNAL MEDICINE

## 2025-03-05 PROCEDURE — 84132 ASSAY OF SERUM POTASSIUM: CPT | Performed by: INTERNAL MEDICINE

## 2025-03-05 PROCEDURE — 999N000127 HC STATISTIC PERIPHERAL IV START W US GUIDANCE

## 2025-03-05 PROCEDURE — 90935 HEMODIALYSIS ONE EVALUATION: CPT | Performed by: INTERNAL MEDICINE

## 2025-03-05 PROCEDURE — 99232 SBSQ HOSP IP/OBS MODERATE 35: CPT | Performed by: INTERNAL MEDICINE

## 2025-03-05 PROCEDURE — 250N000013 HC RX MED GY IP 250 OP 250 PS 637: Performed by: HOSPITALIST

## 2025-03-05 PROCEDURE — 83605 ASSAY OF LACTIC ACID: CPT

## 2025-03-05 PROCEDURE — 250N000011 HC RX IP 250 OP 636: Performed by: INTERNAL MEDICINE

## 2025-03-05 PROCEDURE — 4A023N6 MEASUREMENT OF CARDIAC SAMPLING AND PRESSURE, RIGHT HEART, PERCUTANEOUS APPROACH: ICD-10-PCS | Performed by: INTERNAL MEDICINE

## 2025-03-05 PROCEDURE — 250N000013 HC RX MED GY IP 250 OP 250 PS 637: Performed by: INTERNAL MEDICINE

## 2025-03-05 PROCEDURE — 210N000002 HC R&B HEART CARE

## 2025-03-05 PROCEDURE — 250N000009 HC RX 250: Performed by: INTERNAL MEDICINE

## 2025-03-05 PROCEDURE — 99152 MOD SED SAME PHYS/QHP 5/>YRS: CPT | Performed by: INTERNAL MEDICINE

## 2025-03-05 PROCEDURE — 90937 HEMODIALYSIS REPEATED EVAL: CPT

## 2025-03-05 PROCEDURE — 272N000001 HC OR GENERAL SUPPLY STERILE: Performed by: INTERNAL MEDICINE

## 2025-03-05 PROCEDURE — 93451 RIGHT HEART CATH: CPT | Performed by: INTERNAL MEDICINE

## 2025-03-05 PROCEDURE — 99233 SBSQ HOSP IP/OBS HIGH 50: CPT | Mod: 25 | Performed by: NURSE PRACTITIONER

## 2025-03-05 PROCEDURE — 250N000009 HC RX 250: Performed by: HOSPITALIST

## 2025-03-05 PROCEDURE — C1894 INTRO/SHEATH, NON-LASER: HCPCS | Performed by: INTERNAL MEDICINE

## 2025-03-05 RX ORDER — CALCITRIOL 0.5 UG/1
0.5 CAPSULE, LIQUID FILLED ORAL
Status: ON HOLD
Start: 2025-03-05 | End: 2025-03-17

## 2025-03-05 RX ORDER — POTASSIUM CHLORIDE 1500 MG/1
20 TABLET, EXTENDED RELEASE ORAL
Status: DISCONTINUED | OUTPATIENT
Start: 2025-03-05 | End: 2025-03-05 | Stop reason: HOSPADM

## 2025-03-05 RX ORDER — NALOXONE HYDROCHLORIDE 0.4 MG/ML
0.2 INJECTION, SOLUTION INTRAMUSCULAR; INTRAVENOUS; SUBCUTANEOUS
Status: ACTIVE | OUTPATIENT
Start: 2025-03-05 | End: 2025-03-05

## 2025-03-05 RX ORDER — NALOXONE HYDROCHLORIDE 0.4 MG/ML
0.4 INJECTION, SOLUTION INTRAMUSCULAR; INTRAVENOUS; SUBCUTANEOUS
Status: ACTIVE | OUTPATIENT
Start: 2025-03-05 | End: 2025-03-05

## 2025-03-05 RX ORDER — ACETAMINOPHEN 325 MG/1
650 TABLET ORAL EVERY 4 HOURS PRN
Status: DISCONTINUED | OUTPATIENT
Start: 2025-03-05 | End: 2025-03-06 | Stop reason: HOSPADM

## 2025-03-05 RX ORDER — FENTANYL CITRATE 50 UG/ML
INJECTION, SOLUTION INTRAMUSCULAR; INTRAVENOUS
Status: DISCONTINUED | OUTPATIENT
Start: 2025-03-05 | End: 2025-03-05 | Stop reason: HOSPADM

## 2025-03-05 RX ORDER — SODIUM CHLORIDE 9 MG/ML
75 INJECTION, SOLUTION INTRAVENOUS CONTINUOUS
Status: ACTIVE | OUTPATIENT
Start: 2025-03-05 | End: 2025-03-05

## 2025-03-05 RX ORDER — LIDOCAINE 40 MG/G
CREAM TOPICAL
Status: DISCONTINUED | OUTPATIENT
Start: 2025-03-05 | End: 2025-03-05

## 2025-03-05 RX ORDER — OXYCODONE HYDROCHLORIDE 5 MG/1
5 TABLET ORAL EVERY 4 HOURS PRN
Status: DISCONTINUED | OUTPATIENT
Start: 2025-03-05 | End: 2025-03-06 | Stop reason: HOSPADM

## 2025-03-05 RX ORDER — LORAZEPAM 0.5 MG/1
0.5 TABLET ORAL
Status: DISCONTINUED | OUTPATIENT
Start: 2025-03-05 | End: 2025-03-05 | Stop reason: HOSPADM

## 2025-03-05 RX ORDER — ATROPINE SULFATE 0.1 MG/ML
0.5 INJECTION INTRAVENOUS
Status: ACTIVE | OUTPATIENT
Start: 2025-03-05 | End: 2025-03-05

## 2025-03-05 RX ORDER — ASPIRIN 325 MG
325 TABLET ORAL ONCE
Status: DISCONTINUED | OUTPATIENT
Start: 2025-03-05 | End: 2025-03-05 | Stop reason: HOSPADM

## 2025-03-05 RX ORDER — FLUMAZENIL 0.1 MG/ML
0.2 INJECTION, SOLUTION INTRAVENOUS
Status: ACTIVE | OUTPATIENT
Start: 2025-03-05 | End: 2025-03-05

## 2025-03-05 RX ORDER — ASPIRIN 81 MG/1
243 TABLET, CHEWABLE ORAL ONCE
Status: DISCONTINUED | OUTPATIENT
Start: 2025-03-05 | End: 2025-03-05 | Stop reason: HOSPADM

## 2025-03-05 RX ORDER — FENTANYL CITRATE 50 UG/ML
25 INJECTION, SOLUTION INTRAMUSCULAR; INTRAVENOUS
Status: DISCONTINUED | OUTPATIENT
Start: 2025-03-05 | End: 2025-03-06 | Stop reason: HOSPADM

## 2025-03-05 RX ORDER — LORAZEPAM 2 MG/ML
0.5 INJECTION INTRAMUSCULAR
Status: DISCONTINUED | OUTPATIENT
Start: 2025-03-05 | End: 2025-03-05 | Stop reason: HOSPADM

## 2025-03-05 RX ORDER — OXYCODONE HYDROCHLORIDE 5 MG/1
10 TABLET ORAL EVERY 4 HOURS PRN
Status: DISCONTINUED | OUTPATIENT
Start: 2025-03-05 | End: 2025-03-06 | Stop reason: HOSPADM

## 2025-03-05 RX ADMIN — Medication 60 ML: at 22:45

## 2025-03-05 RX ADMIN — HYDRALAZINE HYDROCHLORIDE 75 MG: 50 TABLET ORAL at 00:05

## 2025-03-05 RX ADMIN — CINACALCET 30 MG: 30 TABLET ORAL at 09:24

## 2025-03-05 RX ADMIN — AMIODARONE HYDROCHLORIDE 200 MG: 200 TABLET ORAL at 09:24

## 2025-03-05 RX ADMIN — Medication: at 14:56

## 2025-03-05 RX ADMIN — AMIODARONE HYDROCHLORIDE 200 MG: 200 TABLET ORAL at 22:04

## 2025-03-05 RX ADMIN — CALCIUM 500 MG: 500 TABLET ORAL at 09:24

## 2025-03-05 RX ADMIN — SODIUM CHLORIDE 500 ML: 0.9 INJECTION, SOLUTION INTRAVENOUS at 14:55

## 2025-03-05 RX ADMIN — HYDRALAZINE HYDROCHLORIDE 75 MG: 50 TABLET ORAL at 18:11

## 2025-03-05 RX ADMIN — Medication 40 MG: at 11:09

## 2025-03-05 RX ADMIN — CALCIUM 500 MG: 500 TABLET ORAL at 18:11

## 2025-03-05 RX ADMIN — Medication 50 MCG: at 09:24

## 2025-03-05 RX ADMIN — ATORVASTATIN CALCIUM 20 MG: 20 TABLET, FILM COATED ORAL at 22:05

## 2025-03-05 RX ADMIN — SODIUM CHLORIDE 250 ML: 0.9 INJECTION, SOLUTION INTRAVENOUS at 14:55

## 2025-03-05 RX ADMIN — LIDOCAINE HYDROCHLORIDE 1 ML: 10 INJECTION, SOLUTION EPIDURAL; INFILTRATION; INTRACAUDAL; PERINEURAL at 14:56

## 2025-03-05 RX ADMIN — CETIRIZINE HYDROCHLORIDE 10 MG: 1 SOLUTION ORAL at 22:05

## 2025-03-05 RX ADMIN — FUROSEMIDE 80 MG: 10 SOLUTION ORAL at 11:09

## 2025-03-05 RX ADMIN — SERTRALINE HYDROCHLORIDE 75 MG: 50 TABLET ORAL at 22:06

## 2025-03-05 RX ADMIN — APIXABAN 5 MG: 5 TABLET, FILM COATED ORAL at 22:03

## 2025-03-05 RX ADMIN — HYDROXYZINE HYDROCHLORIDE 25 MG: 25 TABLET ORAL at 00:05

## 2025-03-05 RX ADMIN — SODIUM CHLORIDE 200 ML: 0.9 INJECTION, SOLUTION INTRAVENOUS at 14:55

## 2025-03-05 RX ADMIN — AMLODIPINE BESYLATE 10 MG: 10 TABLET ORAL at 22:03

## 2025-03-05 ASSESSMENT — ACTIVITIES OF DAILY LIVING (ADL)
ADLS_ACUITY_SCORE: 89
ADLS_ACUITY_SCORE: 93
ADLS_ACUITY_SCORE: 89
ADLS_ACUITY_SCORE: 93
ADLS_ACUITY_SCORE: 89
ADLS_ACUITY_SCORE: 93
ADLS_ACUITY_SCORE: 89
ADLS_ACUITY_SCORE: 93
ADLS_ACUITY_SCORE: 89
ADLS_ACUITY_SCORE: 93
ADLS_ACUITY_SCORE: 89
ADLS_ACUITY_SCORE: 93

## 2025-03-05 NOTE — PROGRESS NOTES
Potassium   Date Value Ref Range Status   03/05/2025 4.3 3.4 - 5.3 mmol/L Final   02/02/2022 4.7 3.4 - 5.3 mmol/L Final   04/17/2021 4.2 3.4 - 5.3 mmol/L Final     Hemoglobin   Date Value Ref Range Status   03/03/2025 7.4 (L) 11.7 - 15.7 g/dL Final   04/16/2021 10.9 (L) 11.7 - 15.7 g/dL Final     Creatinine   Date Value Ref Range Status   03/03/2025 4.76 (H) 0.51 - 0.95 mg/dL Final   04/17/2021 5.98 (H) 0.52 - 1.04 mg/dL Final     Urea Nitrogen   Date Value Ref Range Status   03/03/2025 55.9 (H) 8.0 - 23.0 mg/dL Final   11/24/2021 37 (H) 7 - 30 mg/dL Final   04/17/2021 68 (H) 7 - 30 mg/dL Final     Sodium   Date Value Ref Range Status   03/03/2025 135 135 - 145 mmol/L Final   04/17/2021 137 133 - 144 mmol/L Final     INR   Date Value Ref Range Status   02/14/2025 1.57 (H) 0.85 - 1.15 Final   04/16/2021 1.14 0.86 - 1.14 Final       DIALYSIS PROCEDURE NOTE  Hepatitis status of previous patient on machine log was checked and verified ok to use with this patients hepatitis status.  Patient dialyzed for 3.5 hrs. on a K3 bath with a net fluid removal of  1L.  A BFR of 450 ml/min was obtained via a left AVF using 15 gauge needles.      The treatment plan was discussed with Dr. Roman during the treatment.    Total heparin received during the treatment: 0 units.   Needle cannulation sites held x 10 min.       Meds  given: none   Complications: tolerated treatment with no issues noted      Person educated: patient. Knowledge base substantial. Barriers to learning: none. Educated on procedure via verbal mode. The patient/family verbalized understanding. Pt prefers verbal education style.     ICEBOAT? Timeout performed pre-treatment  I: Patient was identified using 2 identifiers  C:  Consent Signed Yes  E: Equipment preventative maintenance is current and dialysis delivery system OK to use  B: Hepatitis B Surface Antigen: negative; Draw Date: 02/06/24      Hepatitis B Surface Antibody: susceptible; Draw Date: 02/06/25  O:  Dialysis orders present and complete prior to treatment  A: Vascular access verified and assessed prior to treatment  T: Treatment was performed at a clinically appropriate time  ?: Patient was allowed to ask questions and address concerns prior to treatment  See Adult Hemodialysis flowsheet in EPIC for further details and post assessment.  Machine water alarm in place and functioning. Transducer pods intact and checked every 15min.   Pt returned via bed.  Chlorine/Chloramine water system checked every 4 hours.  Outpatient Dialysis at [facility]    Patient repositioned every 2 hours during the treatment.  Post treatment report given to SHANTA Jensen regarding 1L of fluid removed    Please remove patient dressing on AVF and AVG needle sites 24 hours after dialysis. If leaking occurs please apply a Band-Aid.
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
## 2025-03-05 NOTE — PLAN OF CARE
Goal Outcome Evaluation:    Pt A&O x3; disorientated to time. Assist x2 w/ lift- turned and repositioned. Tube feeding @ 70 mL/hr-NPO @ midnight. OXANA Pedraza On 3L NC. L AKA. Plan for a right heart cath tomorrow.

## 2025-03-05 NOTE — PROGRESS NOTES
[facility]    Hospitalist Progress Note    Assessment & Plan   Date of Admission:  3/2/2025        Assessment & Plan  Supriya Herr is a 75 year old female with past medical history of end-stage renal disease on dialysis, type 2 diabetes mellitus, congestive heart failure, peripheral vascular disease, admitted 3/2/2025 for increasing shortness of breath.  Recent hospital discharge from [facility] 2/26/25, see dismissal summary.     Acute heart failure with preserved ejection fraction (HFpEF)  Acute hypoxic respiratory failure  Shortness of breath, anxiety  Small bilateral pleural effusions, pulmonary venous hypertension  Elevated BNP  Elevated troponin  Essential hypertension  History of bradycardia  Chronic anticoagulation, apixaban (Eliquis)   Amiodarone therapy  Recent hospitalization as above for acute hypoxic respiratory failure requiring mechanical ventilation in the setting of influenza A pneumonia, acute COPD exacerbation, hospital-acquired pneumonia, and pharyngeal edema  Hospital admission for shortness of breath in the setting of recent hospital discharge 2/26/2025, see dismissal summary for details.  Complicated hospital stay with acute respiratory failure, pneumonia, acute heart failure, and atrial fibrillation.  History of paroxysmal atrial fibrillation with rapid ventricular response.  Cardiology and electrophysiology consulted on prior hospital stay, required IV amiodarone at that time with transition to oral therapy.  Initial plans for permanent pacemaker on prior hospital stay, later not pursued with no clear indication for placement at this time.  On this visit, ER vitals temperature 98 Fahrenheit, blood pressure 151/51, respiratory rate 18, pulse 69, O2 saturation 97% 3 L nasal cannula  Admission labs WBC 8200, hemoglobin 7.6, platelets 212,000, troponin 64, BNP 33,798, glucose 120, sodium 135, potassium 4.5, creatinine 3.98, anion gap 13  EKG sinus  rhythm, rate 71, nonspecific ST and T wave abnormalities laterally  Chest x-ray with cardiac enlargement, small bilateral pleural effusions with some bibasilar atelectasis, pulmonary venous hypertension  Prior ECHO 1/21/2025 EF 60%, no significant valve disease, see report  Hospital admission for progressive shortness of breath, multifactorial, in the setting of acute on chronic CHF, end-stage renal disease on hemodialysis, atrial fibrillation, and multiple comorbidities.  Nephrology and cardiology consulted.  -Elevated troponin, suspected type II demand ischemia in the setting of acute congestive heart failure and chronic kidney disease with dialysis;   -chf. Volume overload related to esrd, HFpEF  Doubt aspiration. Consider dysrythmia. Has ziopatch on   Anxiety may be playing a role  -Weight down to 90.2.  Had dialysis with fluid removal on 3/3.  -Still with some shortness of breath.  While patient appears euvolemic may still be volume up accounting for her subjective shortness of breath.  Has not tolerated more aggressive fluid removal in the past with associated hypotension and A-fib with RVR.    -Discussed with nephrology and cardiology.  right heart cath on 3/5 to determine if she remains volume up and whether a lower dry weight should be pursued on dialysis.  Updated patient's daughter.  Patient is agreeable.       Plan;   -Zio patch apparently not functional.  Had Zio patch EKG technician review and apparently patient has 2 device components to this Zio patch.  1 on the chest 1 on the leg.  The leg device submits information.  This may be at her nursing facility and can be reattached on arrival to nursing home  -N.p.o.  for right heart cath today  -Cardiology to put in right heart cath orders.  Input appreciated  -No longer requiring oxygen  -Nephrology consult and following.  Appreciate input  -Continue furosemide,  -Cardiology consulted and following.  -Telemetry  -Monitor blood pressure, heart  rate  -I/O's, daily weights, monitor urine output and serum Cr  -Continue amiodarone, amlodipine,hydralazine   -Continue anticoagulation, apixaban (Eliquis)-hold for right heart cath on 3/5   -1500 cc fluid restriction  -AM labs     End-stage renal disease (ESRD), on dialysis  Chronic kidney disease (CKD), stage V  History of left arm fistula    Hemodialysis on3/3 with fluid off, weight down.  Less shortness of breath today but still with subjective shortness of breath possibly related to persistent volume.  On exam appears euvolemic  Plan;   -N.p.o.  -Right heart cath today to further evaluate volume status and whether to pursue more aggressive dry weight on dialysis.  Nephrology consulted for dialysis, fluid balance, assist with medical management  Monitor I's and O's, daily weights, dialysis as per nephrology      S/p PEG tube  Gastroesophageal reflux disease  Vague dysphagia intermittent oral pharynx upper esophagus  Percutaneous endoscopic gastrostomy (PEG) tube placement prior hospital stay, with concerns of nutrition and swallowing per daughter.  Nutrition service consulted to manage tube feedings  Speech and language therapy (SLP) consulted, evaluate swallow, diet recommendation  Continue pantoprazole  - gerd precautions  - follow symptoms of rare dysphagia for solid foods.   -Esophagram 3/4-normal  -Follow for dysphagia symptoms outpatient.  If clearly persist then patient would need upper endoscopy outpatient  -Speech therapy at nursing home     Hyperlipidemia  Continue atorvastatin     Anemia of chronic disease, end-stage renal disease  Admission hemoglobin 7.6, baseline 7.0-8.0; MCV 99  Monitor hemoglobin  Hb stable 7-8 range. No recent iron labs   Follow-up anemia with outpatient clinic provider, nephrology to review  -EPO per nephrology     Type II diabetes mellitus  Admission glucose 120  HbA1c 6.5% on 1/17/2025  PTA lantus 15 units bid  BS blood sugars 100 range.  On nocturnal tube feed    Plan;  "  -N.p.o. for right heart cath today hold Lantus-restart once taking p.o.  Monitor blood sugars  Insulin sliding scale  Hypoglycemia protocol  Lantus 5 units twice daily adjust accordingly.-On hold currently for n.p.o. status  Diabetic diet  Follow for need of prandial aspart  Nocturnal TFs per nutrition      History of anxiety and depression  Continue sertraline  Stop Low dose lorazepam prn,   Consider psychiatry consult for anxiety pending symptoms and clinical course     Gastroesophageal reflux disease  Continue pantoprazole     Obstructive sleep apnea (JONE)  Continue home regimen  Monitor oxygen saturations, encourage incentive spirometry     Weakness and deconditioning  S/p left above-knee amputation, traumatic  PT, OT consulted  Increase activity as tolerated  Fall precautions     Coccyx pressure Injury Stage: either Stage 2 or 3   Reported on admission, WOC consulted  Wound care treatment per wound care, monitor closely     Full code status  Confirmed on admission with patient and daughter; intubated prior hospital stay     Disposition  Anticipated discharge timing see Disposition Plan below and Medically Ready for Discharge link  Anticipated discharge to transitional care unit   Social work consulted for discharge planning    Code Status: Full Code    Medically Ready for Discharge: Anticipated Tomorrow once nephrology and cardiology workup with right heart cath has been completed    Clinically Significant Risk Factors                     # Hypertension: Noted on problem list  # Acute heart failure with preserved ejection fraction: heart failure noted on problem list, last echo with EF >50%, and receiving IV diuretics          # DMII: A1C = 6.5 % (Ref range: <5.7 %) within past 6 months   # Obesity: Estimated body mass index is 32.74 kg/m  as calculated from the following:    Height as of this encounter: 1.676 m (5' 6\").    Weight as of this encounter: 92 kg (202 lb 13.2 oz)., PRESENT ON ADMISSION       # " Financial/Environmental Concerns: none       Moderate complex medical decision making.  Greater than 400 minutes spent on patient care including chart review, charting, exam, , care coordination with bedside nurse and IV access nurse      Brenden Douglass MD, MD  Text Page  (7am to 6pm)  Interval History   Stable night.  Shortness of breath is feels better.  No new issues.  Right heart cath today.    -Data reviewed today: I reviewed all new labs and imaging results over the last 24 hours. I personally reviewed labs and imaging since admission    Physical Exam   Temp: 98  F (36.7  C) Temp src: Oral BP: 137/62 Pulse: 72   Resp: 18 SpO2: 100 % O2 Device: Nasal cannula Oxygen Delivery: 2 LPM  Vitals:    03/02/25 2209 03/03/25 1333 03/05/25 0600   Weight: 92.1 kg (203 lb 0.7 oz) 90.2 kg (198 lb 13.7 oz) 92 kg (202 lb 13.2 oz)     Vital Signs with Ranges  Temp:  [98  F (36.7  C)-98.6  F (37  C)] 98  F (36.7  C)  Pulse:  [70-75] 72  Resp:  [15-18] 18  BP: (132-149)/(48-68) 137/62  SpO2:  [100 %] 100 %  I/O last 3 completed shifts:  In: 2120 [P.O.:1440; NG/GT:120]  Out: -     Constitutional: In bed, nad  Respiratory: CTAB, breathing easily  Cardiovascular: RRR no r/g/m, no edema  Chest- ziopatch in place  GI: peg tube -normal site  Soft, nt, nd  Skin/Integumen: no edema  Ortho -left above-knee amputation noted, nl stump       Medications   Current Facility-Administered Medications   Medication Dose Route Frequency Provider Last Rate Last Admin    Patient is already receiving anticoagulation with heparin, enoxaparin (LOVENOX), warfarin (COUMADIN)  or other anticoagulant medication   Does not apply Continuous PRN Walter Alvarado MD         Current Facility-Administered Medications   Medication Dose Route Frequency Provider Last Rate Last Admin    - MEDICATION INSTRUCTIONS for Dialysis Patients -   Does not apply See Admin Instructions Walter Alvarado MD        amiodarone (PACERONE) tablet 200 mg  200 mg  Oral or Feeding Tube BID Brenden Douglass MD   200 mg at 03/05/25 0924    amLODIPine (NORVASC) tablet 10 mg  10 mg Oral or Feeding Tube BID Brenden Douglass MD   10 mg at 03/04/25 2150    [Held by provider] apixaban ANTICOAGULANT (ELIQUIS) tablet 5 mg  5 mg Oral or Feeding Tube BID Brenden Douglass MD   5 mg at 03/04/25 0940    aspirin (ASA) tablet 325 mg  325 mg Oral Once Faina Claire NP        Or    aspirin (ASA) chewable tablet 243 mg  243 mg Oral Once Faina Claire NP        atorvastatin (LIPITOR) tablet 20 mg  20 mg Oral or Feeding Tube QPM Brenden Douglass MD   20 mg at 03/04/25 2150    calcitRIOL (ROCALTROL) capsule 0.5 mcg  0.5 mcg Oral or Feeding Tube Once per day on Monday Wednesday Friday Brenden Douglass MD        calcium carbonate 500 mg (elemental) (OSCAL) tablet 500 mg  500 mg Oral or Feeding Tube TID w/meals Brenden Douglass MD   500 mg at 03/04/25 1735    cetirizine (zyrTEC) solution 10 mg  10 mg Oral or Feeding Tube At Bedtime Brenden Douglass MD   10 mg at 03/04/25 2150    cinacalcet (SENSIPAR) tablet 30 mg  30 mg Oral Once per day on Monday Wednesday Friday Walter Alvarado MD   30 mg at 03/05/25 0924    furosemide (LASIX) solution 80 mg  80 mg Oral or Feeding Tube Daily Brenden Douglsas MD   80 mg at 03/05/25 1109    hydrALAZINE (APRESOLINE) tablet 75 mg  75 mg Oral or Feeding Tube Q8H Brenden Douglass MD   75 mg at 03/05/25 0005    insulin aspart (NovoLOG) injection (RAPID ACTING)  1-7 Units Subcutaneous TID AC Brenden Douglass MD   1 Units at 03/04/25 1324    insulin aspart (NovoLOG) injection (RAPID ACTING)  1-5 Units Subcutaneous At Bedtime Brenden Douglass MD        [Held by provider] insulin glargine (LANTUS PEN) injection 5 Units  5 Units Subcutaneous BID Brenden Douglass MD   5 Units at 03/04/25 0924    midodrine (PROAMATINE) tablet 5 mg  5 mg Oral or Feeding Tube Once per day on Monday Wednesday Friday Brenden Douglass MD        pantoprazole  (PROTONIX) 2 mg/mL suspension 40 mg  40 mg Per Feeding Tube QAM AC Walter Alvarado MD   40 mg at 03/05/25 1109    Prosource TF20 ENfit Compatibl EN LIQD (PROSOURCE TF20) packet 60 mL  1 packet Per Feeding Tube BID Walter Alvarado MD   60 mL at 03/04/25 2151    sertraline (ZOLOFT) tablet 75 mg  75 mg Oral or Feeding Tube At Bedtime Brenden Douglass MD   75 mg at 03/04/25 2150    sodium chloride (PF) 0.9% PF flush 3 mL  3 mL Intracatheter Q8H Walter Alvarado MD   3 mL at 03/05/25 0006    sodium chloride 0.9% BOLUS 500 mL  500 mL Hemodialysis Machine Once Alysia Roman MD        sodium chloride 0.9% BOLUS 500 mL  500 mL Hemodialysis Machine Once Alysia Roman MD        vitamin D3 (CHOLECALCIFEROL) tablet 50 mcg  50 mcg Oral or Feeding Tube Daily Brenden Douglass MD   50 mcg at 03/05/25 0924       Data   Recent Labs   Lab 03/05/25  0203 03/04/25  2121 03/04/25  1650 03/03/25  1334 03/03/25  0631 03/02/25  2347 03/02/25  1606 03/01/25  0809   WBC  --   --   --   --  6.0  --  8.2 8.5   HGB  --   --   --   --  7.4*  --  7.6* 8.0*   MCV  --   --   --   --  100  --  99 101*   PLT  --   --   --   --  184  --  212 183   NA  --   --   --   --  135  --  135 134*   POTASSIUM  --   --   --   --  4.4  --  4.5 4.4   CHLORIDE  --   --   --   --  97*  --  95* 97*   CO2  --   --   --   --  23  --  27 27   BUN  --   --   --   --  55.9*  --  50.7* 28.7*   CR  --   --   --   --  4.76*  --  3.98* 2.45*   ANIONGAP  --   --   --   --  15  --  13 10   JODY  --   --   --   --  9.5  --  9.8 9.7   * 139* 108*   < > 96   < > 120* 323*    < > = values in this interval not displayed.       Imaging:   Recent Results (from the past 24 hours)   XR Esophagram    Narrative    EXAM: XR ESOPHAGRAM SINGLE CONTRAST  LOCATION: [facility]  DATE: 3/4/2025    INDICATION: intermittent dysphagia posterior pharynx, upper esophagus, r o lesion  COMPARISON: None.  TECHNIQUE: Routine.    RADIATION DOSE:  Total Air Kerma 2.8 mGy    FINDINGS:   ESOPHAGUS: Normal. Normal peristalsis. No strictures, masses, or inflammatory changes. No gastroesophageal reflux in the recumbent position.      Impression    IMPRESSION:   1.  Normal esophagram.

## 2025-03-05 NOTE — PROGRESS NOTES
Care Management Follow Up    Length of Stay (days): 3    Expected Discharge Date: 03/07/2025     Concerns to be Addressed:       Patient plan of care discussed at interdisciplinary rounds: Yes    Anticipated Discharge Disposition:     Return to TCU           Anticipated Discharge Services:    Anticipated Discharge DME:      Patient/family educated on Medicare website which has current facility and service quality ratings:  yes  Education Provided on the Discharge Plan:  yes  Patient/Family in Agreement with the Plan:  yes    Referrals Placed by CM/SW:  Post acute facilities   Private pay costs discussed: Not applicable    Discussed  Partnership in Safe Discharge Planning  document with patient/family: No    Handoff Completed: Yes, [facility] PCP: Internal handoff referral completed    Additional Information:  Writer send out additional TCU referrals that were listed in initial consult note, however; no acceptances. Writer sent referral via DOD to [facility] and confirmed with Caroline in admissions that patient has a bed hold.    VM received from Shelia-  (ph: [phone]) with [facility] confirming she reached out to the daughter yesterday but she did not want to talk since she was at the hospital and confirmed that they were trying to set up a time within the next day to discuss concerns with the daughter.    Call received from YOKASTA Rodriguez with [facility]. They are aware of daughter's concerns and are working on discussing these with the family.     Addendum 1500: Updated from  that patient may be ready for discharge tomorrow. Spoke to bedside RN who confirmed patient would need a stretcher. Call placed to  [facility] and spoke with Dominga. Stretcher ride arranged for tomorrow at [phone] with O2. Stretcher needing for O2 management and supervision. Updated Caroline in admissions at [facility] regarding ride time for tomorrow. PCS form Completed. PAS is not needed since patient was admitted from  facility.     Call placed to Caroline Gray (daughter) to discuss return to TCU likely tomorrow. Daughter expressed her frustrations with the TCU and is hoping to speak with Shelia at [facility] regarding her concerns around 3pm. Extensive conversation surrounding what care looks like across the board for TCU while also providing empathy and understanding that daughter wants good care for her mom with her complex needs to reduce hospitalizations. Daughter asked if patient could return home. We reviewed extensively what that would look like and the care her mom would need. Caroline Gray clearly stated patient was able to pivot transfer before her first hospitalization in January and their goal is for patient to rehab and get stronger. Writer explained that ultimately if that is their goal the best place for the rehab to occur more frequently is at TCU instead of home. Shared that patient does have an 18 day bed hold and therefore can return to [facility] if she is ready tomorrow. Explained other referrals were sent and beds are not available or facility unable to meet patient's needs. Encouraged Caroline gray to speak with staff at [facility] to help determine a plan that can care for patient safely. Also provided her with the office of [facility] number.     Daughter was made aware of the ride time for tomorrow if patient is ready. Caroline Gray asking that the MD call her in the morning to review her status if patient is ready. Updated  of ride time for tomorrow and that writer will need orders by 11. Also updated him on daughter's request for a call tomorrow if patient is ready.     Next Steps: Continue to follow    KAITLYNN Mcbride, St. Mary[facility] Regional Medical CenterSW  Social Work- Inpatient Care Coordination  [facility]

## 2025-03-05 NOTE — PROGRESS NOTES
"Pt is seen on dialysis. She had a R heart cath today which was very helpful given her recurrent presentations for SOB.     RA 9  RV 47/0, 6  PA 55/14 (30)  PCWP 20     CO 10.27  CI 5.11    Based upon these results we set her UF for 1 kg. Upon arrival to the dialysis unit her O2 sats were 89% but we had her take some deep breaths and put her on NC and she came up to the high 90's. Currently she is 100% on RA. Blood pressures have been in the 110-140 range during dialysis. No rvr.     Vital signs:  Temp: 98  F (36.7  C) Temp src: Oral BP: 137/49 Pulse: 68   Resp: 22 SpO2: 100 % O2 Device: None (Room air) Oxygen Delivery: 2 LPM Height: 167.6 cm (5' 6\") Weight: 92 kg (202 lb 13.2 oz)  Estimated body mass index is 32.74 kg/m  as calculated from the following:    Height as of this encounter: 1.676 m (5' 6\").    Weight as of this encounter: 92 kg (202 lb 13.2 oz).      Gen: NAD alert and oriented. Calm today  Lungs: Clear anterior. Somewhat diminished at bases  Card: irr. Rate is controlled in 60's  Abd: soft  Accss: L upper arm AVF with good flow L hand is warm    Last Comprehensive Metabolic Panel:  Lab Results   Component Value Date     03/03/2025    POTASSIUM 4.3 03/05/2025    CHLORIDE 97 (L) 03/03/2025    CO2 23 03/03/2025    ANIONGAP 15 03/03/2025     (H) 03/05/2025    BUN 55.9 (H) 03/03/2025    CR 4.76 (H) 03/03/2025    GFRESTIMATED 9 (L) 03/03/2025    JODY 9.5 03/03/2025     Labs 2/28 IN DIALYSIS:  Calcium 8.8  PTH: 446  Phos: 2.6  Iron saturation 10% with Ferritin of 1203  Hgb: 8.3    A/p:  ESKD: Dialysis MWF. She attends the [facility] where her primary nephrologist is Dr. Basilio although she has been hospitalized often enough that it has been some time since she was seen outpt.       2. Volume: Obtain weight post run today as based upon BP's and her R heart cath we are at EDW. Initially listed as 92.5 although we had a weight of 92 coming into dialysis today. Even given variences " between locations she may have an actual EDW slightly below that of 91 or so.     3. Bone Mineral: Phos on 3/3 was 5. She is prescribed sevelemir. In the outpt setting she was very low at 2.6 but is now on regular tube feeds. Continue to hold the sevelemir. I would also discontinue the cinacalcet.     4. Anemia: She will be managed in the dialysis unit per protocol. In hospital we use epo but will be converted to mircera as an outpt.     5. HTN: amlodipine now at 10 mg daily. We have had good pressures here so would not change.      Alysia Roman MD MS  Tom preferred

## 2025-03-05 NOTE — PROGRESS NOTES
Shift: [id number] 3/4/2025  Summary: Present 3/2 for SOB and Anxiety  History: ESRD (Dialysis MWF); L AKA    Orientation: AO x3; Little disoriented to time, intermittent confusion  Pain: Denied  Vitals/Tele: VSS 2L NC; CPAP overnight   IV Access/drains: R PIV SL; PEG Tube; L AVF (thrill and bruit present)  Diet: Combo MOD CHO Renal; TF 6p-6am 70 mL/hr; NPO at midnight for Cath tomorrow, TF needs to be paused at Midnight  Mobility: A2 Lift; Hx of L AKA   GI/: 1 BM; Making minimal urine d/t hemodialysis   Wound/Skinscattered bruises/scabs, coccyx-buttocks wounds, L AKA, RLE shin/foot discoloration   Consults: Cardiology/Nephrology/SW/PT/OT/nutrition   Discharge Plan: TBD      See Flow sheets for assessment

## 2025-03-05 NOTE — PLAN OF CARE
Neuro:alert, disoriented to time, anxious  Tele/cardiac: SR  Respiration: 2L NC  Activity: A2 with lift, L. AKA  Pain: denies  Drips: PIV SL, LUE fistula  Drains/tubes: PEG clamped from midnight  Skin: sacral pressure ulcer  GI/: incontinent, scarce urine, on HD  Aggression color: green  Isolation: none  Plan: RHC today

## 2025-03-06 VITALS
TEMPERATURE: 98.5 F | DIASTOLIC BLOOD PRESSURE: 82 MMHG | SYSTOLIC BLOOD PRESSURE: 149 MMHG | RESPIRATION RATE: 18 BRPM | HEIGHT: 66 IN | BODY MASS INDEX: 36.07 KG/M2 | HEART RATE: 76 BPM | OXYGEN SATURATION: 92 % | WEIGHT: 224.43 LBS

## 2025-03-06 LAB
ERYTHROCYTE [DISTWIDTH] IN BLOOD BY AUTOMATED COUNT: 16.4 % (ref 10–15)
GLUCOSE BLDC GLUCOMTR-MCNC: 190 MG/DL (ref 70–99)
GLUCOSE BLDC GLUCOMTR-MCNC: 238 MG/DL (ref 70–99)
HCT VFR BLD AUTO: 25.4 % (ref 35–47)
HGB BLD-MCNC: 7.7 G/DL (ref 11.7–15.7)
MCH RBC QN AUTO: 30.4 PG (ref 26.5–33)
MCHC RBC AUTO-ENTMCNC: 30.3 G/DL (ref 31.5–36.5)
MCV RBC AUTO: 100 FL (ref 78–100)
PLATELET # BLD AUTO: 169 10E3/UL (ref 150–450)
RBC # BLD AUTO: 2.53 10E6/UL (ref 3.8–5.2)
WBC # BLD AUTO: 5.2 10E3/UL (ref 4–11)

## 2025-03-06 PROCEDURE — 250N000013 HC RX MED GY IP 250 OP 250 PS 637: Performed by: INTERNAL MEDICINE

## 2025-03-06 PROCEDURE — 99239 HOSP IP/OBS DSCHRG MGMT >30: CPT | Performed by: INTERNAL MEDICINE

## 2025-03-06 PROCEDURE — 250N000013 HC RX MED GY IP 250 OP 250 PS 637: Performed by: HOSPITALIST

## 2025-03-06 PROCEDURE — 85027 COMPLETE CBC AUTOMATED: CPT | Performed by: INTERNAL MEDICINE

## 2025-03-06 PROCEDURE — 36415 COLL VENOUS BLD VENIPUNCTURE: CPT | Performed by: INTERNAL MEDICINE

## 2025-03-06 RX ORDER — NALOXONE HYDROCHLORIDE 0.4 MG/ML
0.2 INJECTION, SOLUTION INTRAMUSCULAR; INTRAVENOUS; SUBCUTANEOUS
Status: DISCONTINUED | OUTPATIENT
Start: 2025-03-06 | End: 2025-03-06 | Stop reason: HOSPADM

## 2025-03-06 RX ORDER — B COMPLEX C NO.10/FOLIC ACID 900MCG/5ML
5 LIQUID (ML) ORAL DAILY
Status: DISCONTINUED | OUTPATIENT
Start: 2025-03-06 | End: 2025-03-06 | Stop reason: HOSPADM

## 2025-03-06 RX ORDER — NALOXONE HYDROCHLORIDE 0.4 MG/ML
0.4 INJECTION, SOLUTION INTRAMUSCULAR; INTRAVENOUS; SUBCUTANEOUS
Status: DISCONTINUED | OUTPATIENT
Start: 2025-03-06 | End: 2025-03-06 | Stop reason: HOSPADM

## 2025-03-06 RX ADMIN — HYDRALAZINE HYDROCHLORIDE 75 MG: 50 TABLET ORAL at 09:09

## 2025-03-06 RX ADMIN — Medication 40 MG: at 09:10

## 2025-03-06 RX ADMIN — Medication 50 MCG: at 09:09

## 2025-03-06 RX ADMIN — FUROSEMIDE 80 MG: 10 SOLUTION ORAL at 09:09

## 2025-03-06 RX ADMIN — HYDROXYZINE HYDROCHLORIDE 25 MG: 25 TABLET ORAL at 03:49

## 2025-03-06 RX ADMIN — Medication 60 ML: at 09:10

## 2025-03-06 RX ADMIN — HYDRALAZINE HYDROCHLORIDE 75 MG: 50 TABLET ORAL at 00:23

## 2025-03-06 RX ADMIN — INSULIN ASPART 2 UNITS: 100 INJECTION, SOLUTION INTRAVENOUS; SUBCUTANEOUS at 09:07

## 2025-03-06 RX ADMIN — AMLODIPINE BESYLATE 10 MG: 10 TABLET ORAL at 09:09

## 2025-03-06 RX ADMIN — APIXABAN 5 MG: 5 TABLET, FILM COATED ORAL at 09:09

## 2025-03-06 RX ADMIN — CALCIUM 500 MG: 500 TABLET ORAL at 09:09

## 2025-03-06 RX ADMIN — AMIODARONE HYDROCHLORIDE 200 MG: 200 TABLET ORAL at 09:09

## 2025-03-06 ASSESSMENT — ACTIVITIES OF DAILY LIVING (ADL)
ADLS_ACUITY_SCORE: 89

## 2025-03-06 NOTE — DISCHARGE SUMMARY
[facility]    Discharge Summary  Hospitalist    Date of Admission:  3/2/2025  Date of Discharge:  3/6/2025  Discharging Provider: Brenden Douglass MD, MD    Discharge Diagnoses   Acute heart failure with preserved ejection fraction (HFpEF)  Acute hypoxic respiratory failure  Shortness of breath, anxiety, atelectasis,   Small bilateral pleural effusions, pulmonary venous hypertension  Elevated BNP  Elevated troponin  Essential hypertension  History of bradycardia  Chronic anticoagulation, apixaban (Eliquis)   Amiodarone therapy  Recent hospitalization as above for acute hypoxic respiratory failure requiring mechanical ventilation in the setting of influenza A pneumonia, acute COPD exacerbation, hospital-acquired pneumonia, and pharyngeal edema  End-stage renal disease (ESRD), on dialysis  Chronic kidney disease (CKD), stage V  History of left arm fistula   S/p PEG tube  Gastroesophageal reflux disease  Vague dysphagia intermittent oral pharynx upper esophagus-nl esophagram   JONE on cpap, inconsistent use    History of Present Illness   Supriya Herr is a 75 year old female presenting with increased shortness of breath, progressive over several days though likely longstanding, acute on chronic.  Recent [facility] hospitalization 1/8/2025 through 2/26/2025 for acute on chronic heart failure, A-fib with RVR, hypertension, shock, and symptomatic bradycardia, see dismissal summary for details.  Discharged to transitional care unit, skilled care. Complicated hospital stay with acute respiratory failure, pneumonia, acute heart failure, and atrial fibrillation.  History of paroxysmal atrial fibrillation with rapid ventricular response.  Cardiology and electrophysiology consulted on prior hospital stay, required IV amiodarone at that time with transition to oral therapy.  Initial plans for permanent pacemaker on prior hospital stay, later not pursued with no clear indication for  "placement at this time.  On this occasion, several day history of worsening shortness of breath, both at rest and with minimal exertion.  Intermittent cough productive of phlegm which is \"whitish\" in color.  End-stage renal disease on dialysis, last dialyzing 2 days prior to admission.  Weight reportedly stable by report.  No headaches.  Sleep is \"terrible\".  Intermittent anxiety reported.  No chest pain.  No nausea or vomiting.  No fever, chills, or sweats.     Prior ER visit day prior to admission for shortness of breath, see ER note for details.     On this visit, ER vitals temperature 98 Fahrenheit, blood pressure 151/51, respiratory rate 18, pulse 69, O2 saturation 97% 3 L nasal cannula  Admission labs WBC 8200, hemoglobin 7.6, platelets 212,000, troponin 64, BNP 33,798, glucose 120, sodium 135, potassium 4.5, creatinine 3.98, anion gap 13  EKG sinus rhythm, rate 71, nonspecific ST and T wave abnormalities laterally  Chest x-ray with cardiac enlargement, small bilateral pleural effusions with some bibasilar atelectasis, pulmonary venous hypertension  Hospital admission for progressive shortness of breath, multifactorial, in the setting of acute on chronic CHF, end-stage renal disease on hemodialysis, atrial fibrillation, and multiple comorbidities.  Nephrology and cardiology consulted.          Hospital Course   Supriya Herr was admitted on 3/2/2025.  The following problems were addressed during her hospitalization:    Active Problems:    Acute on chronic systolic congestive heart failure (H)    Pulmonary vascular congestion    ESRD on hemodialysis (H)    Dyspnea, unspecified type  Date of Admission:  3/2/2025        Assessment & Plan  Supriya Herr is a 75 year old female with past medical history of end-stage renal disease on dialysis, type 2 diabetes mellitus, congestive heart failure, peripheral vascular disease, admitted 3/2/2025 for increasing shortness of breath.  Recent hospital discharge from " [facility] 2/26/25, see dismissal summary.     Acute heart failure with preserved ejection fraction (HFpEF)  Acute hypoxic respiratory failure  Shortness of breath, anxiety  Small bilateral pleural effusions, pulmonary venous hypertension  Elevated BNP  Elevated troponin  Essential hypertension  History of bradycardia  Chronic anticoagulation, apixaban (Eliquis)   Amiodarone therapy  Recent hospitalization as above for acute hypoxic respiratory failure requiring mechanical ventilation in the setting of influenza A pneumonia, acute COPD exacerbation, hospital-acquired pneumonia, and pharyngeal edema  Hospital admission for shortness of breath in the setting of recent hospital discharge 2/26/2025, see dismissal summary for details.  Complicated hospital stay with acute respiratory failure, pneumonia, acute heart failure, and atrial fibrillation.  History of paroxysmal atrial fibrillation with rapid ventricular response.  Cardiology and electrophysiology consulted on prior hospital stay, required IV amiodarone at that time with transition to oral therapy.  Initial plans for permanent pacemaker on prior hospital stay, later not pursued with no clear indication for placement at this time.  On this visit, ER vitals temperature 98 Fahrenheit, blood pressure 151/51, respiratory rate 18, pulse 69, O2 saturation 97% 3 L nasal cannula  Admission labs WBC 8200, hemoglobin 7.6, platelets 212,000, troponin 64, BNP 33,798, glucose 120, sodium 135, potassium 4.5, creatinine 3.98, anion gap 13  EKG sinus rhythm, rate 71, nonspecific ST and T wave abnormalities laterally  Chest x-ray with cardiac enlargement, small bilateral pleural effusions with some bibasilar atelectasis, pulmonary venous hypertension  Prior ECHO 1/21/2025 EF 60%, no significant valve disease, see report  Hospital admission for progressive shortness of breath, multifactorial, in the setting of acute on chronic CHF, end-stage renal disease on  hemodialysis, atrial fibrillation, and multiple comorbidities.  Nephrology and cardiology consulted.  -Elevated troponin, suspected type II demand ischemia in the setting of acute congestive heart failure and chronic kidney disease with dialysis;   -chf. Volume overload related to esrd, HFpEF  Doubt aspiration.Has ziopatch on   Anxiety may be playing a role  -Weight down to 90.2.  Had dialysis with fluid removal on 3/3.  -Still with some shortness of breath.  While patient appears euvolemic may still be volume up accounting for her subjective shortness of breath.  Has not tolerated more aggressive fluid removal in the past with associated hypotension and A-fib with RVR.    -right heart cath 3/5 showed mild elevated RA and PCWP readings likely from from elevated left sided filling pressures.   - pt dialyzed post run and additional 1kg removed.   - of note, pt had nl sats before and after this HD run  - going forward, pt's EDW will be rec'd to be slightly lower if pt able to tolerate  - her subjective sob (with nl sats) likely combination of atelectasis, some anxiety and some volume.    -Zio patch apparently not functional.  Had Zio patch EKG technician review and apparently patient has 2 device components to this Zio patch.  1 on the chest 1 on the leg.  The leg device submits information.  This may be at her nursing facility and can be reattached on arrival to nursing home  -not needing oxygen at discharge   -follow up on dialysis, may attempt slightly lower EDW from 92.5.kg  -Continue furosemide  -outpatient cardiology follow up   -zio patch at discharge-see above   -Continue amiodarone, amlodipine,hydralazine   -Continue anticoagulation, apixaban (Eliquis)-hold for right heart cath on 3/5   - aggressive IS to treat atelectasis   -for now given occasional dips of oxygen level to 85-90% nocturnal will discharge on oxygen 2L nocturnal to wean off at tcu  - encouraged regular nightly use of cpap  - aggressive IS       End-stage renal disease (ESRD), on dialysis  Chronic kidney disease (CKD), stage V  History of left arm fistula     Hemodialysis on3/3 with fluid off, weight down.  Less shortness of breath today but still with subjective shortness of breath possibly related to persistent volume.  On exam appears euvolemic  -see discussion above. Mild elevation in filling pressures on Rheart cath. 1 extra kilo removed.   -per renal. They will rec going forward to pursue slightly lower dry wt if sbp and HR can tolerate   discussed with renal on discharge  - per renal, hold sevelemir and stop cinacalcet     S/p PEG tube  Gastroesophageal reflux disease  Vague dysphagia intermittent oral pharynx upper esophagus  Percutaneous endoscopic gastrostomy (PEG) tube placement prior hospital stay, with concerns of nutrition and swallowing per daughter.  Nutrition service consulted to manage tube feedings  Speech and language therapy (SLP) consulted, evaluate swallow, diet recommendation  Continue pantoprazole  - gerd precautions  - follow symptoms of rare dysphagia for solid foods.   -Esophagram 3/4-normal  -Follow for dysphagia symptoms outpatient.  If clearly persist then patient would need upper endoscopy outpatient  -Speech therapy at nursing home     Hyperlipidemia  Continue atorvastatin     Anemia of chronic disease, end-stage renal disease  Admission hemoglobin 7.6, baseline 7.0-8.0; MCV 99  Monitor hemoglobin  Hb stable 7-8 range. No recent iron labs   Follow-up anemia with outpatient clinic provider, nephrology to review  -EPO per nephrology     Type II diabetes mellitus  Admission glucose 120  HbA1c 6.5% on 1/17/2025  PTA lantus 15 units bid  BS blood sugars 100-200 range am of discharge.  On nocturnal tube feed  Lantus adjusted in hospital  Will discharge on 10 units bid for now, adjust back up to 15 units bid at PTA dosing accordingly   Nocturnal TFs per nutrition      History of anxiety and depression  Continue sertraline  Stop  Low dose lorazepam prn,   Consider psychiatry consult for anxiety pending symptoms and clinical course     Gastroesophageal reflux disease  Continue pantoprazole     Obstructive sleep apnea (JONE)  Atelectasis bibasilar  Mild occasional nocturnal hypoxia  Sense of sob at night.   Continue home regimen  Monitor oxygen saturations, encourage incentive spirometry  Encouraged nightly cpap to help with atelectasis   For now as she occasionally dips oxygen levels at night to 85-90% will discharge on 2L nocturnal oxygen to be weaned off at tcu      Weakness and deconditioning  S/p left above-knee amputation, traumatic  PT, OT consulted  Increase activity as tolerated  Fall precautions     Coccyx pressure Injury Stage: either Stage 2 or 3   Reported on admission, WOC consulted  Wound care treatment per wound care, monitor closely     Full code status  Confirmed on admission with patient and daughter; intubated prior hospital stay     Disposition- [facility]     Code Status: Full Code       Brenden Douglass MD, MD    Significant Results and Procedures   See hospital course     Pending Results   These results will be followed up by hospitalist   Unresulted Labs Ordered in the Past 30 Days of this Admission       Date and Time Order Name Status Description    1/20/2025  8:19 PM Acid-Fast Bacilli Culture and Stain In process             Code Status   Full Code       Primary Care Physician   Noam Jackson    Physical Exam   Temp: 98.5  F (36.9  C) Temp src: Oral BP: (!) 149/82 Pulse: 76   Resp: 18 SpO2: 95 % O2 Device: (P) Nasal cannula Oxygen Delivery: 2 LPM  Vitals:    03/03/25 1333 03/05/25 0600 03/06/25 0500   Weight: 90.2 kg (198 lb 13.7 oz) 92 kg (202 lb 13.2 oz) 101.8 kg (224 lb 6.9 oz)     Vital Signs with Ranges  Temp:  [98  F (36.7  C)-98.5  F (36.9  C)] 98.5  F (36.9  C)  Pulse:  [65-81] 76  Resp:  [13-31] 18  BP: (113-161)/() 149/82  SpO2:  [76 %-100 %] 95 %  I/O last 3 completed shifts:  In: 240  [NG/GT:240]  Out: 1000 [Other:1000]    Constitutional: In bed, nad  Respiratory:     CTAB, breathing easily  Cardiovascular: RRR no r/g/m, no edema  Chest- ziopatch in place  GI: peg tube -normal site  Soft, nt, nd  Skin/Integumen: no edema  Ortho -left above-knee amputation noted, nl stump       Discharge Disposition   Discharged to long-term care facility  Condition at discharge: Good    Consultations This Hospital Stay   WOUND OSTOMY CONTINENCE NURSE  IP CONSULT  CARDIOLOGY IP CONSULT  NEPHROLOGY IP CONSULT  NUTRITION SERVICES ADULT IP CONSULT  PHYSICAL THERAPY ADULT IP CONSULT  OCCUPATIONAL THERAPY ADULT IP CONSULT  CARE MANAGEMENT / SOCIAL WORK IP CONSULT  SPEECH LANGUAGE PATH ADULT IP CONSULT  CARE MANAGEMENT / SOCIAL WORK IP CONSULT  CARE MANAGEMENT / SOCIAL WORK IP CONSULT  PHARMACY IP CONSULT  VASCULAR ACCESS ADULT IP CONSULT  PHYSICAL THERAPY ADULT IP CONSULT  OCCUPATIONAL THERAPY ADULT IP CONSULT  PHARMACY IP CONSULT  SMOKING CESSATION PROGRAM IP CONSULT    Time Spent on this Encounter   Brenden QUIÑONEZ MD, personally saw the patient today and spent greater than 30 minutes discharging this patient.    Discharge Orders      Primary Care - Care Coordination Referral      Patient care order    Meal time insulin sliding scale:   Correction Scale - MEDIUM INSULIN RESISTANCE DOSING     Do Not give Correction Insulin if Pre-Meal BG less than 140.   For Pre-Meal  - 189 give 1 unit.   For Pre-Meal  - 239 give 2 units.   For Pre-Meal  - 289 give 3 units.   For Pre-Meal  - 339 give 4 units.   For Pre-Meal - 389 give 5 units.   For Pre-Meal -439 give 6 units  For Pre-Meal BG greater than or equal to 440 give 7 units.   To be given with prandial insulin, and based on pre-meal blood glucose. Administering insulin within 5 minutes of the start of the meal is ideal. Administer insulin no more than 30 minutes after the start of the meal, unless directed otherwise by provider.      Notify provider if glucose greater than or equal to 350 mg/dL after administration of correction dose.     Patient care order    Bedtime insulin sliding scale:     General info for SNF    Length of Stay Estimate: Short Term Care: Estimated # of Days <30  Condition at Discharge: Improving  Level of care:skilled   Rehabilitation Potential: Good  Admission H&P remains valid and up-to-date: Yes  Recent Chemotherapy: N/A  Use Nursing Home Standing Orders: Yes     Mantoux instructions    Give two-step Mantoux (PPD) Per Facility Policy Yes     Follow Up and recommended labs and tests    -follow up with dialysis Monday, Wednesday, Friday as per usual schedule  - follow up with [facility] regarding amiodorone therapy   - follow up with PCP 1 week after discharge from TCU     Glucose monitor nursing POCT    Before meals and at bedtime     Intake and output    Every shift     Activity - Up with nursing assistance     Reason for your hospital stay    ESRD on dialysis  Acute heart failure with preserved ejection fraction (HFpEF)  Acute hypoxic respiratory failure  Shortness of breath, anxiety  Small bilateral pleural effusions, pulmonary venous hypertension  Elevated BNP  Elevated troponin  Essential hypertension  History of bradycardia  Chronic anticoagulation, apixaban (Eliquis)   Amiodarone therapy  Recent hospitalization as above for acute hypoxic respiratory failure requiring mechanical ventilation in the setting of influenza A pneumonia, acute COPD exacerbation, hospital-acquired pneumonia, and pharyngeal edema  /p PEG tube  Gastroesophageal reflux disease  Vague dysphagia intermittent oral pharynx upper esophagus  DM-2 on insulin     Patient care order    Routine feeding tube cares  Pt is on nocturnal tube feeds and dialysis diet     Patient care order    -encourage aggressive incentive spirometry every 2 hours during day while awake     Patient care order    -patient's ziopatch not reading, ensure that the  chest and leg/hip component is in place so that it is functioning appropriately     Patient care order    Dialysis- mod carb diabetic diet  Nocturnal tube feeds     CPAP - Continue Home CPAP    Continue Home CPAP per home equipment settings.     Full Code     Physical Therapy Adult Consult    Evaluate and treat as clinically indicated.    Reason:  deconditioned     Occupational Therapy Adult Consult    Evaluate and treat as clinically indicated.    Reason:  deconditioned     Oxygen (SNF/TCU) Discharge     Fall precautions     Diet    Follow this diet upon discharge: Current Diet:Orders Placed This Encounter      Fluid restriction 1500 ML FLUID      Adult Formula Drip Feeding: Specified Time Grecia Estrella Renal Support; Gastrostomy; Goal Rate: 70; mL/hr; From: 6:00 PM; To: 6:00 AM      NPO for Procedure/Surgery per Anesthesia Guidelines Except for: Meds; Clear liquids before procedure/surgery: ADULT (Age GREATER than or Equal to 18 years) - Clear liquids 2 hours before procedure/     Discharge Medications   Current Discharge Medication List        START taking these medications    Details   !! insulin aspart (NOVOLOG PEN) 100 UNIT/ML pen Inject 1-5 Units subcutaneously at bedtime.    Associated Diagnoses: Type 2 diabetes mellitus with diabetic neuropathy, with long-term current use of insulin (H)      !! insulin aspart (NOVOLOG PEN) 100 UNIT/ML pen Inject 1-7 Units subcutaneously 3 times daily (before meals).    Associated Diagnoses: Type 2 diabetes mellitus with diabetic neuropathy, with long-term current use of insulin (H)      insulin glargine (LANTUS PEN) 100 UNIT/ML pen Inject 10 Units subcutaneously 2 times daily.    Comments: If Lantus is not covered by insurance, may substitute Basaglar or Semglee or other insulin glargine product per insurance preference at same dose and frequency.    Associated Diagnoses: Type 2 diabetes, HbA1C goal < 8% (H)       !! - Potential duplicate medications found. Please discuss with  provider.        CONTINUE these medications which have CHANGED    Details   calcitRIOL (ROCALTROL) 0.5 MCG capsule Take 1 capsule (0.5 mcg) by mouth three times a week. Given at dialysis    Comments: Given at dialysis  Associated Diagnoses: ESRD (end stage renal disease) on dialysis (H)           CONTINUE these medications which have NOT CHANGED    Details   acetaminophen (TYLENOL) 325 MG tablet Take 2 tablets (650 mg) by mouth every 4 hours as needed for mild pain  Qty: 50 tablet, Refills: 0    Associated Diagnoses: ESRD on dialysis (H)      albuterol (PROAIR HFA/PROVENTIL HFA/VENTOLIN HFA) 108 (90 Base) MCG/ACT inhaler Inhale 2 puffs into the lungs every 6 hours as needed for shortness of breath / dyspnea or wheezing  Qty: 3 Inhaler, Refills: 1    Comments: Pharmacy may dispense brand covered by insurance (Proair, or proventil or ventolin or generic albuterol inhaler)  Associated Diagnoses: Cough      amiodarone (PACERONE) 200 MG tablet Take 1 tablet (200 mg) by mouth 2 times daily.    Associated Diagnoses: Persistent atrial fibrillation (H)      amLODIPine (NORVASC) 5 MG tablet Take 2 tablets (10 mg) by mouth 2 times daily. (Hold for SBP<100)    Associated Diagnoses: Hypertension goal BP (blood pressure) < 140/90      apixaban ANTICOAGULANT (ELIQUIS) 5 MG tablet Take 1 tablet (5 mg) by mouth or Feeding Tube 2 times daily.    Associated Diagnoses: Paroxysmal atrial fibrillation (H)      atorvastatin (LIPITOR) 20 MG tablet Take 1 tablet (20 mg) by mouth every evening.  Qty: 90 tablet, Refills: 3    Associated Diagnoses: Hypercholesterolemia      cetirizine (ZYRTEC) 10 MG tablet Take 10 mg by mouth at bedtime.      furosemide (LASIX) 80 MG tablet Take 1 tablet (80 mg) by mouth or Feeding Tube daily.    Associated Diagnoses: CKD (chronic kidney disease) stage 5, GFR less than 15 ml/min (H); Anemia due to stage 5 chronic kidney disease, 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 (H)      Glucagon (BAQSIMI ONE PACK) 3 MG/DOSE POWD Spray  3 mg in nostril See Admin Instructions USE ONLY FOR SEVERE HYPOGLYCEMIA.  Qty: 1 each, Refills: 3    Associated Diagnoses: Type 2 diabetes mellitus with hyperglycemia, with long-term current use of insulin (H)      hydrALAZINE (APRESOLINE) 25 MG tablet Take 3 tablets (75 mg) by mouth every 8 hours.    Associated Diagnoses: Hypertension goal BP (blood pressure) < 140/90      ipratropium - albuterol 0.5 mg/2.5 mg/3 mL (DUONEB) 0.5-2.5 (3) MG/3ML neb solution Take 1 vial (3 mLs) by nebulization every 4 hours as needed for wheezing or shortness of breath.    Associated Diagnoses: Influenza A      miconazole (MICATIN) 2 % external powder Apply topically 2 times daily as needed (redness under breasts/groin) Apply twice daily to skin folds as needed  Qty: 90 g, Refills: 11    Associated Diagnoses: Intertrigo      midodrine (PROAMATINE) 5 MG tablet Take 1 tablet (5 mg) by mouth three times a week. (Prior to dialysis)    Associated Diagnoses: ESRD (end stage renal disease) (H)      olopatadine (PATANOL) 0.1 % ophthalmic solution Place 1 drop into both eyes 2 times daily as needed for allergies.      pantoprazole (PROTONIX) 2 mg/mL SUSP suspension Place 20 mLs (40 mg) into Feeding Tube every morning (before breakfast).    Associated Diagnoses: Gastroesophageal reflux disease with esophagitis without hemorrhage      !! sertraline (ZOLOFT) 25 MG tablet TAKE 1  TABLET BY MOUTH EVERY DAY along with a 50 mg tablet for a total of 75 mg  Qty: 90 tablet, Refills: 3    Associated Diagnoses: NICOLE (generalized anxiety disorder)      !! sertraline (ZOLOFT) 50 MG tablet Take 1 tablet (50 mg) by mouth at bedtime  Qty: 90 tablet, Refills: 3    Comments: Takes with 25 mg for total of 75 mg. Needs to fill now  Associated Diagnoses: NICOLE (generalized anxiety disorder)      sevelamer carbonate (RENVELA) 800 MG tablet Take two with meals and one with snacks  Qty: 300 tablet, Refills: 11    Associated Diagnoses: CKD (chronic kidney disease) stage  5, GFR less than 15 ml/min (H)      tacrolimus (PROTOPIC) 0.1 % external ointment Apply topically 2 times daily. To skin folds  Qty: 60 g, Refills: 5    Associated Diagnoses: Inverse psoriasis      triamcinolone (KENALOG) 0.025 % external ointment Apply topically 2 times daily. To rash under breasts and groin as needed  Qty: 80 g, Refills: 11    Associated Diagnoses: Inverse psoriasis      Vitamin D3 50 mcg (2000 units) tablet TAKE ONE TABLET BY MOUTH ONCE DAILY  Qty: 90 tablet, Refills: 2    Associated Diagnoses: Vitamin D deficiency      blood glucose (NO BRAND SPECIFIED) test strip Use to test blood sugar 3 times daily or as directed.whatever is covered  Qty: 300 strip, Refills: 3    Associated Diagnoses: Type 2 diabetes mellitus with diabetic nephropathy, with long-term current use of insulin (H)      blood glucose (ONE TOUCH DELICA) lancing device Device to be used with lancets.needs lancets device for delica lancets  Qty: 1 each, Refills: 1    Associated Diagnoses: Type 2 diabetes mellitus with diabetic chronic kidney disease, unspecified CKD stage, unspecified whether long term insulin use (H)      blood glucose monitoring (NO BRAND SPECIFIED) meter device kit Use to test blood sugar 3 times daily or as directed. Whatever is covered  Qty: 1 kit, Refills: 1    Associated Diagnoses: Type 2 diabetes mellitus with diabetic nephropathy, with long-term current use of insulin (H)      blood glucose monitoring (ULTRA THIN 30G) lancets Use to test blood sugar 3 times daily or as directed.watever is covered  Qty: 300 each, Refills: 3    Associated Diagnoses: Type 2 diabetes mellitus with diabetic nephropathy, with long-term current use of insulin (H)      Continuous Blood Gluc  (FREESTYLE PHOENIX 2 READER) BELKYS Use to read blood sugars as per 's instructions.  Qty: 1 each, Refills: 0    Associated Diagnoses: Type 2 diabetes mellitus with diabetic neuropathy, with long-term current use of insulin (H)  "     !! Continuous Glucose Sensor (FREESTYLE PHOENIX 2 SENSOR) MISC Change every 14 days.  Qty: 6 each, Refills: 0    Associated Diagnoses: Type 2 diabetes mellitus with diabetic neuropathy, with long-term current use of insulin (H); Cellulitis of right lower extremity      !! Continuous Glucose Sensor (FREESTYLE PHOENIX 2 SENSOR) MISC Change every 14 days.  Qty: 6 each, Refills: 5    Associated Diagnoses: Type 2 diabetes mellitus with diabetic neuropathy, with long-term current use of insulin (H)      !! Continuous Glucose Sensor (FREESTYLE PHOENIX 2 SENSOR) MISC Change every 14 days.  Qty: 6 each, Refills: 5    Associated Diagnoses: Type 2 diabetes mellitus with diabetic neuropathy, with long-term current use of insulin (H)      insulin pen needle (BD PEN NEEDLE ALEX 2ND GEN) 32G X 4 MM miscellaneous USE 4 DAILY WITH INSULIN INJECTIONS.  Qty: 400 each, Refills: 1    Associated Diagnoses: Type 2 diabetes mellitus with hyperglycemia, with long-term current use of insulin (H)      !! order for DME Equipment being ordered: mattress overlay for hospital bed  Wt. 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 order for DME 1 wheelchair  Qty: 1 Device, Refills: 0    Associated Diagnoses: Traumatic amputation of lower extremity above knee, unspecified laterality, subsequent encounter; CKD (chronic kidney disease) stage 3, GFR 30-59 ml/min (H); Type 2 diabetes mellitus with stage 3 chronic kidney disease, without long-term current use of insulin (H)       !! - Potential duplicate medications found. Please discuss with provider.        STOP taking these medications       B and C vitamin Complex with folic acid (NEPHRONEX) 0.9 MG/5ML " LIQD liquid Comments:   Reason for Stopping:         cinacalcet (SENSIPAR) 30 MG tablet Comments:   Reason for Stopping:         insulin glargine 100 UNIT/ML pen Comments:   Reason for Stopping:             Allergies   Allergies   Allergen Reactions    Ampicillin-Sulbactam Sodium Rash     No evidence SJS, but very uncomfortable and precipitated multiple provider visits. Would not use penicillins again if other options available.     Penicillins Rash     Data   Most Recent 3 CBC's:  Recent Labs   Lab Test 03/06/25  0633 03/03/25  0631 03/02/25  1606   WBC 5.2 6.0 8.2   HGB 7.7* 7.4* 7.6*    100 99    184 212      Most Recent 3 BMP's:  Recent Labs   Lab Test 03/06/25  0746 03/06/25  0204 03/05/25  2119 03/05/25  1824 03/05/25  1356 03/03/25  1334 03/03/25  0631 03/02/25  2347 03/02/25  1606 03/01/25  0809   NA  --   --   --   --   --   --  135  --  135 134*   POTASSIUM  --   --   --   --  4.3  --  4.4  --  4.5 4.4   CHLORIDE  --   --   --   --   --   --  97*  --  95* 97*   CO2  --   --   --   --   --   --  23  --  27 27   BUN  --   --   --   --   --   --  55.9*  --  50.7* 28.7*   CR  --   --   --   --   --   --  4.76*  --  3.98* 2.45*   ANIONGAP  --   --   --   --   --   --  15  --  13 10   JODY  --   --   --   --   --   --  9.5  --  9.8 9.7   * 190* 184*   < >  --    < > 96   < > 120* 323*    < > = values in this interval not displayed.     Most Recent 2 LFT's:  Recent Labs   Lab Test 02/05/25 2004 02/02/25  0542   AST 17 12   ALT 24 22   ALKPHOS 75 55   BILITOTAL 0.3 0.2     Most Recent INR's and Anticoagulation Dosing History:  Anticoagulation Dose History  More data exists         Latest Ref Rng & Units 7/13/2020 9/25/2020 11/16/2020 4/9/2021 4/16/2021 4/24/2024 2/14/2025   Recent Dosing and Labs   INR 0.85 - 1.15 1.11  1.14  1.15  1.14  1.14  1.11  1.57      Most Recent 3 Troponin's:No lab results found.  Most Recent Cholesterol Panel:  Recent Labs   Lab Test 04/21/23  1405   CHOL 113   LDL  39   HDL 50   TRIG 122     Most Recent 6 Bacteria Isolates From Any Culture (See EPIC Reports for Culture Details):  Recent Labs   Lab Test 03/19/21  0900 06/24/18  1100 06/23/18  2348 06/23/18  2347 06/18/18  0949 05/19/18  0119   CULT Light growth  Normal skin shree    Light growth  Streptococcus agalactiae sero group B  This organism is susceptible to ampicillin, penicillin, vancomycin and the cephalosporins.   If treatment is required AND your patient is allergic to penicillin, contact the   Microbiology Lab within 5 days to request susceptibility testing.  *  Moderate growth  Candida albicans  Susceptibility testing not routinely done  * Moderate growth  Corynebacterium striatum  Identification obtained by MALDI-TOF mass spectrometry research use only database. Test   characteristics determined and verified by the [facility]   ([facility]) [place], [place].  *  Light growth  Staphylococcus aureus  *  Single colony  Beta hemolytic Streptococcus group B  *  Light growth  Gram positive bacilli resembling diphtheroids  No further identification  Susceptibility testing not routinely done  *  Susceptibility testing requested by  Dr. Chen for routine sensitivities on the Corynebacterium striatum on 6.26.18. ME.    Moderate growth  Staphylococcus aureus  Susceptibility testing done on previous specimen  *  Moderate growth  Corynebacterium striatum  Identification obtained by MALDI-TOF mass spectrometry research use only database. Test   characteristics determined and verified by the [facility]   ([facility]) [place], [place].  Susceptibility testing not routinely done  *  Incorrectly ordered by PCU/Clinic  Canceled, Test credited  See Accesion number [id number] No growth No growth Moderate growth  Beta hemolytic Streptococcus group C  *  Moderate growth  Staphylococcus aureus  *  Light growth  Normal skin shree   No growth     Most Recent TSH, T4 and A1c  Labs:  Recent Labs   Lab Test 02/05/25  1736 01/17/25 2039   TSH 5.67*  --    T4 1.32  --    A1C  --  6.5*     Results for orders placed or performed during the hospital encounter of 03/02/25   Chest XR,  PA & LAT    Narrative    EXAM: XR CHEST 2 VIEWS  LOCATION: [facility]  DATE: 3/2/2025    INDICATION: dyspnea  COMPARISON: 3/1/2025.      Impression    IMPRESSION: Cardiac enlargement. Small bilateral pleural effusions with some bibasilar atelectasis. Pulmonary venous hypertension. Findings are those of volume overload/congestive heart failure unchanged from previous.   XR Esophagram    Narrative    EXAM: XR ESOPHAGRAM SINGLE CONTRAST  LOCATION: [facility]  DATE: 3/4/2025    INDICATION: intermittent dysphagia posterior pharynx, upper esophagus, r o lesion  COMPARISON: None.  TECHNIQUE: Routine.    RADIATION DOSE: Total Air Kerma 2.8 mGy    FINDINGS:   ESOPHAGUS: Normal. Normal peristalsis. No strictures, masses, or inflammatory changes. No gastroesophageal reflux in the recumbent position.      Impression    IMPRESSION:   1.  Normal esophagram.   Cardiac Catheterization    Narrative    Moderate pulmonary HTN with moderately elevated right and left sided   filling pressures  Normal cardiac index by Saleem.        RA 9  RV 47/0, 6  PA 55/14 (30)  PCWP 20    CO 10.27  CI 5.11       *Note: Due to a large number of results and/or encounters for the requested time period, some results have not been displayed. A complete set of results can be found in Results Review.

## 2025-03-06 NOTE — PLAN OF CARE
"Speech Language Therapy Discharge Summary    Reason for therapy discharge:    Discharged to transitional care facility.    Progress towards therapy goal(s). See goals on Care Plan in Epic electronic health record for goal details.  Goals partially met.  Barriers to achieving goals:   discharge from facility.    Therapy recommendation(s):    Continued therapy is recommended.  Rationale/Recommendations:  See below.    Clinical swallow evaluation completed 3/3/2025: \"Pt presents with minimal-no oral-pharyngeal dysphagia based on lack of lower dentition. Oral-pharyngeal function is WFL; however, pt continues to complain about intermittent feeling of food sticking/globus sensation resulting in poor po intake. VFSS was completed during pt's previous hospital stay with no significant pharyngeal residue and no penetration/aspration. Esophageal dysphagia symptoms were reported at that time. From an oral-pharyngeal perspective ok for regular diet textures, picking soft food from diet, and thin liquids with reminders to use GERD precautions. Consider completion of an esophagram to further assess esophageal phase of swallow. SLP to f/u x 1 to train GERD precautions.\"    Recommend short-course SLP to ensure diet tolerance with swallow strategies and reflux precautions.                            "

## 2025-03-06 NOTE — PROGRESS NOTES
"CLINICAL NUTRITION SERVICES - BRIEF NOTE    Chart reviewed for nutrition support patient. RD already following, see previous note for full assessment.   Per WOC note on 3/3, pt with Pressure Injury to coccyx - \"either Stage 2 or 3 deferring definitive staging until wound base has evolved, present on admission\"    Adding Expedite as well as Nephronex to assist with wound healing.    RD team will continue to follow    Grecia Smith RD, LD  Clinical Dietitian  [facility]      "

## 2025-03-06 NOTE — PROGRESS NOTES
Care Management Discharge Note    Discharge Date: 03/06/2025       Discharge Dis[facility]:  [facility]    Discharge Services:  TCU    Discharge DME:  O2    Discharge Transportation: family or friend will provide    Private pay costs discussed: transportation costs    Does the patient's insurance plan have a 3 day qualifying hospital stay waiver?  Yes     Which insurance plan 3 day waiver is available? Alternative insurance waiver    Will the waiver be used for post-acute placement? Yes    PAS Confirmation Code:  N/A  Patient/family educated on Medicare website which has current facility and service quality ratings:  yes    Education Provided on the Discharge Plan:  yes  Persons Notified of Discharge Plans: patient, daughter, nursing, MD, TCU  Patient/Family in Agreement with the Plan:      Handoff Referral Completed: Yes, [facility] PCP: Internal handoff referral completed    Additional Information:  Writer paged  and confirmed patient will be able to discharge to [facility] today. Writer updated RN of ride time who will relay this to patient. RN confirmed that  will call the daughter and talk with her also. Discharge orders received and faxed via [facility] to [facility]. Updated HUC on discharge plan for today.     Stretcher transport arranged for today at [phone]. Facility has been updated on transport and in agreement with return.     Attempted to reach daughter to discuss plans for discharge again. No answer and left VM requesting a call back.     Addendum 1130: Spoke with Caroline Gray about patient discharging today to [facility]. She confirmed she did talk with Shelia at [facility] regarding concerns but has a few more questions. Explained writer will talk with the facility and ask that they call. She Is also wanting to ensure dialysis rides have been arranged and unit is aware. Writer explained that she will ensure the team updates dialysis unit and will  only call ginny Gray back if there is a barrier to getting a ride arranged for dialysis. She is aware patient is discharging today. Confirmed she did speak with  today regarding patient returning to [facility] . Spoke to Caroline and balta at [facility]. They are working with the [facility] to ensure rides are arranged for dialysis and denied any concern with patient returning today. Caroline confirmed that YOKASTA Rodriguez is planning to call daughter today she is just in a meeting at this time. Writer attempted to contact dialysis unit regarding patient resuming services tomorrow, however; could not get a hold of anyone. Writer sent a message to Alysia Roman with Nephrology and asked that she ensure a handoff is given to dialysis unit regarding patient resuming OP services tomorrow. She confirmed she will do this.     Call received back from Caroline in admissions at [facility]. Confirmed that Their [facility] set up rides again for patient for dialysis.  time is at 11am from [facility] and then 1430 from [facility] KAITLYNN Aguila, LICSW  Social Work- Inpatient Care Coordination  [facility]

## 2025-03-06 NOTE — PLAN OF CARE
Physical Therapy Discharge Summary    Reason for therapy discharge:    Discharged to transitional care facility.    Progress towards therapy goal(s). See goals on Care Plan in [facility] electronic health record for goal details.  Goals partially met.  Barriers to achieving goals:   discharge from facility.    Therapy recommendation(s):    Continued therapy is recommended.  Rationale/Recommendations:  cont PT at TCU to further address deficits and optimize functional recovery.

## 2025-03-06 NOTE — DISCHARGE SUMMARY
Dialysis Discharge Summary Brief    [facility]  Nephrology Discharge Dialysis Orders  Ph: [phone]  Fax: [phone]    Supriya Herr  MRN: [id number]  YOB: 1950    [facility] [facility]: [facility]  Primary Nephrologist: sherlyn    Date of Admission: 3/2/2025  Date of Discharge:   Discharge Diagnosis:    ICD-10-CM    1. ESRD (end stage renal disease) on dialysis (H)  N18.6 calcitRIOL (ROCALTROL) 0.5 MCG capsule    Z99.2       2. Dyspnea, unspecified type  R06.00       3. ESRD on hemodialysis (H)  N18.6     Z99.2       4. Pulmonary vascular congestion  R09.89       5. Acute on chronic systolic congestive heart failure (H)  I50.23 Primary Care - Care Coordination Referral     Case Request Cath Lab: Right Heart Cath     Case Request Cath Lab: Right Heart Cath     Cardiac Catheterization     Cardiac Catheterization     Oxygen (SNF/TCU) Discharge      6. Type 2 diabetes mellitus with diabetic neuropathy, with long-term current use of insulin (H)  E11.40 insulin aspart (NOVOLOG PEN) 100 UNIT/ML pen    Z79.4 insulin aspart (NOVOLOG PEN) 100 UNIT/ML pen      7. Type 2 diabetes, HbA1C goal < 8% (H)  E11.9 insulin glargine (LANTUS PEN) 100 UNIT/ML pen          Pt was admitted with sob. She had recently been discharged to a TCU and had 1 run in the outpt unit on 2/28 before admission. While here she was intermittently hypoxic but most of the time had O2 saturations in the 90's on RA. There was a question regarding her volume status so a right heart cath was donw RA pressures were 9 and wedge was 20. At the time this was obtained we were pre dialysis and at her presumed EDW (weights are challenging in the hospital). During her dialysis runs here we did not have any hypotensive episodes. Also had no RVR listed EDW is 92.5. Our weight on our last run was 92 pre and we pulled 1 kg. Again, unclear if our weights are consistent with the outpt scales.     New Orders (if not  applicable put NA):  Estimated Dry Weight TBD listed as 92.5 but may be slightly lower.    Dialysis Duration    Dialysis Access    Antibiotics (dose per dialysis, end date)            Labs to be drawn at dialysis    Other major changes to dialysis prescription (e.g. Dialysate bath, heparin, blood flow rate, etc)      Medication changes (also fax the unit a copy of the discharge summary)              Name of physician completing this form: Alysia Roman MD, MD

## 2025-03-06 NOTE — PROGRESS NOTES
Patient medically ready to return to TCU. Stretcher transport arranged for [phone]. Daughter and patient updated by hospitalist and SW/CHAZ. Discharge instructions discussed with patient and daughter. Educated the patient and daughter on the importance of properly and regularly using the IS and CPAP.

## 2025-03-06 NOTE — PLAN OF CARE
Care plan note:      Recent Vitals:  Temp: 98.1  F (36.7  C) Temp src: Oral BP: 139/55 Pulse: 81   Resp: 16 SpO2: 96 % O2 Device: Nasal cannula      Orientation/Neuro: Alert and Oriented x 4  Pain: The patient is not having any pain.   Tele: Sinus rhythm    IV medications: None   Mobility: Assist of 2 and Total w/ lift   Skin: Wounds: Coccyx and Bruises: scattered.   Resp: 2L NC- Oxygen saturation variable, can range from 85%-90's on room air.  GI: WDL  : Strict I&O     Diet: Tolerating diet:   Fair. Nocturnal Tube feeding started at 1800.  Orders Placed This Encounter      Diet      Combination Diet Renal Diet (dialysis); Moderate Consistent Carb (60 g CHO per Meal) Diet      Safety/Concerns:  Fall Risk and Ben Risk  Aggression Color: Green    Plan: Pt tolerated Right heart catheterization and Dialysis today. Unable to entirely wean oxygen since saturation are variable on room air at rest between 85% to 93%.   Continue to monitor.      Mieky Hernandez RN

## 2025-03-06 NOTE — PLAN OF CARE
"Goal Outcome Evaluation:    Pt alert orient x4, intermittently confused forgetful at times, denies any pain BP (!) 141/50 (BP Location: Right arm)   Pulse 81   Temp 98  F (36.7  C) (Oral)   Resp 16   Ht 1.676 m (5' 6\")   Wt 92 kg (202 lb 13.2 oz)   LMP  (LMP Unknown)   SpO2 98%   BMI 32.74 kg/m    On 2L oxygen. Refusing CPAP, pt calling out in the hallway, Given Hydroxyzine x1. Plan to continue diuresis and dialysis. Cardiology and nephrology following.    "

## 2025-03-09 ENCOUNTER — APPOINTMENT (OUTPATIENT)
Dept: GENERAL RADIOLOGY | Facility: CLINIC | Age: 75
DRG: 280 | End: 2025-03-09
Payer: COMMERCIAL

## 2025-03-09 ENCOUNTER — HOSPITAL ENCOUNTER (INPATIENT)
Facility: CLINIC | Age: 75
DRG: 280 | End: 2025-03-09
Attending: EMERGENCY MEDICINE | Admitting: HOSPITALIST
Payer: COMMERCIAL

## 2025-03-09 ENCOUNTER — APPOINTMENT (OUTPATIENT)
Dept: GENERAL RADIOLOGY | Facility: CLINIC | Age: 75
DRG: 280 | End: 2025-03-09
Attending: EMERGENCY MEDICINE
Payer: COMMERCIAL

## 2025-03-09 DIAGNOSIS — Z79.4 TYPE 2 DIABETES MELLITUS WITH DIABETIC NEUROPATHY, WITH LONG-TERM CURRENT USE OF INSULIN (H): ICD-10-CM

## 2025-03-09 DIAGNOSIS — Z79.01 ANTICOAGULATED: ICD-10-CM

## 2025-03-09 DIAGNOSIS — I10 HYPERTENSION GOAL BP (BLOOD PRESSURE) < 140/90: ICD-10-CM

## 2025-03-09 DIAGNOSIS — R79.89 ELEVATED BRAIN NATRIURETIC PEPTIDE (BNP) LEVEL: ICD-10-CM

## 2025-03-09 DIAGNOSIS — Z79.4 TYPE 2 DIABETES MELLITUS WITH HYPERGLYCEMIA, WITH LONG-TERM CURRENT USE OF INSULIN (H): ICD-10-CM

## 2025-03-09 DIAGNOSIS — I50.23 ACUTE ON CHRONIC SYSTOLIC CONGESTIVE HEART FAILURE (H): ICD-10-CM

## 2025-03-09 DIAGNOSIS — L08.9 DIABETIC FOOT INFECTION (H): ICD-10-CM

## 2025-03-09 DIAGNOSIS — E11.65 TYPE 2 DIABETES MELLITUS WITH HYPERGLYCEMIA, WITH LONG-TERM CURRENT USE OF INSULIN (H): ICD-10-CM

## 2025-03-09 DIAGNOSIS — E11.628 DIABETIC FOOT INFECTION (H): ICD-10-CM

## 2025-03-09 DIAGNOSIS — R79.89 ELEVATED TROPONIN: ICD-10-CM

## 2025-03-09 DIAGNOSIS — E78.5 DYSLIPIDEMIA: ICD-10-CM

## 2025-03-09 DIAGNOSIS — I48.19 PERSISTENT ATRIAL FIBRILLATION (H): ICD-10-CM

## 2025-03-09 DIAGNOSIS — E11.9 TYPE 2 DIABETES MELLITUS (H): ICD-10-CM

## 2025-03-09 DIAGNOSIS — I73.9 PVD (PERIPHERAL VASCULAR DISEASE): ICD-10-CM

## 2025-03-09 DIAGNOSIS — D64.9 CHRONIC ANEMIA: ICD-10-CM

## 2025-03-09 DIAGNOSIS — N18.6 ESRD (END STAGE RENAL DISEASE) ON DIALYSIS (H): ICD-10-CM

## 2025-03-09 DIAGNOSIS — J96.21 ACUTE ON CHRONIC RESPIRATORY FAILURE WITH HYPOXIA (H): ICD-10-CM

## 2025-03-09 DIAGNOSIS — E78.5 HYPERLIPIDEMIA LDL GOAL <100: ICD-10-CM

## 2025-03-09 DIAGNOSIS — I48.0 PAROXYSMAL ATRIAL FIBRILLATION (H): ICD-10-CM

## 2025-03-09 DIAGNOSIS — J96.21 ACUTE AND CHRONIC RESPIRATORY FAILURE WITH HYPOXIA (H): ICD-10-CM

## 2025-03-09 DIAGNOSIS — R09.02 HYPOXIA: ICD-10-CM

## 2025-03-09 DIAGNOSIS — Z99.2 ESRD (END STAGE RENAL DISEASE) ON DIALYSIS (H): ICD-10-CM

## 2025-03-09 DIAGNOSIS — F41.9 ANXIETY AND DEPRESSION: ICD-10-CM

## 2025-03-09 DIAGNOSIS — D64.9 ANEMIA, UNSPECIFIED TYPE: ICD-10-CM

## 2025-03-09 DIAGNOSIS — N18.6 ESRD ON HEMODIALYSIS (H): ICD-10-CM

## 2025-03-09 DIAGNOSIS — F33.1 MODERATE RECURRENT MAJOR DEPRESSION (H): ICD-10-CM

## 2025-03-09 DIAGNOSIS — E11.9 TYPE 2 DIABETES, HBA1C GOAL < 8% (H): ICD-10-CM

## 2025-03-09 DIAGNOSIS — F32.A ANXIETY AND DEPRESSION: ICD-10-CM

## 2025-03-09 DIAGNOSIS — I27.20 PULMONARY HYPERTENSION (H): Chronic | ICD-10-CM

## 2025-03-09 DIAGNOSIS — U07.1 INFECTION DUE TO 2019 NOVEL CORONAVIRUS: Primary | ICD-10-CM

## 2025-03-09 DIAGNOSIS — R06.00 DYSPNEA, UNSPECIFIED TYPE: ICD-10-CM

## 2025-03-09 DIAGNOSIS — J10.1 INFLUENZA A: ICD-10-CM

## 2025-03-09 DIAGNOSIS — Z99.2 ESRD ON HEMODIALYSIS (H): ICD-10-CM

## 2025-03-09 DIAGNOSIS — J18.9 PNEUMONIA OF RIGHT LOWER LOBE DUE TO INFECTIOUS ORGANISM: ICD-10-CM

## 2025-03-09 DIAGNOSIS — E87.5 HYPERKALEMIA: ICD-10-CM

## 2025-03-09 DIAGNOSIS — G47.09 OTHER INSOMNIA: Chronic | ICD-10-CM

## 2025-03-09 DIAGNOSIS — G47.33 OSA (OBSTRUCTIVE SLEEP APNEA): ICD-10-CM

## 2025-03-09 DIAGNOSIS — E11.40 TYPE 2 DIABETES MELLITUS WITH DIABETIC NEUROPATHY, WITH LONG-TERM CURRENT USE OF INSULIN (H): ICD-10-CM

## 2025-03-09 DIAGNOSIS — J90 PLEURAL EFFUSION: ICD-10-CM

## 2025-03-09 DIAGNOSIS — N18.6 ESRD (END STAGE RENAL DISEASE) (H): ICD-10-CM

## 2025-03-09 DIAGNOSIS — E78.00 HYPERCHOLESTEROLEMIA: ICD-10-CM

## 2025-03-09 LAB
ANION GAP SERPL CALCULATED.3IONS-SCNC: 11 MMOL/L (ref 7–15)
BASE EXCESS BLDV CALC-SCNC: 1.6 MMOL/L (ref -3–3)
BASOPHILS # BLD AUTO: 0 10E3/UL (ref 0–0.2)
BASOPHILS NFR BLD AUTO: 1 %
BUN SERPL-MCNC: 47.6 MG/DL (ref 8–23)
CALCIUM SERPL-MCNC: 9.9 MG/DL (ref 8.8–10.4)
CHLORIDE SERPL-SCNC: 97 MMOL/L (ref 98–107)
CREAT SERPL-MCNC: 3.78 MG/DL (ref 0.51–0.95)
EGFRCR SERPLBLD CKD-EPI 2021: 12 ML/MIN/1.73M2
EOSINOPHIL # BLD AUTO: 0 10E3/UL (ref 0–0.7)
EOSINOPHIL NFR BLD AUTO: 1 %
ERYTHROCYTE [DISTWIDTH] IN BLOOD BY AUTOMATED COUNT: 16.2 % (ref 10–15)
FLUAV RNA SPEC QL NAA+PROBE: NEGATIVE
FLUBV RNA RESP QL NAA+PROBE: NEGATIVE
GLUCOSE BLDC GLUCOMTR-MCNC: 143 MG/DL (ref 70–99)
GLUCOSE BLDC GLUCOMTR-MCNC: 165 MG/DL (ref 70–99)
GLUCOSE SERPL-MCNC: 343 MG/DL (ref 70–99)
HCO3 BLDV-SCNC: 30 MMOL/L (ref 21–28)
HCO3 SERPL-SCNC: 27 MMOL/L (ref 22–29)
HCT VFR BLD AUTO: 23.1 % (ref 35–47)
HGB BLD-MCNC: 7 G/DL (ref 11.7–15.7)
HOLD SPECIMEN: NORMAL
IMM GRANULOCYTES # BLD: 0 10E3/UL
IMM GRANULOCYTES NFR BLD: 0 %
LYMPHOCYTES # BLD AUTO: 0.5 10E3/UL (ref 0.8–5.3)
LYMPHOCYTES NFR BLD AUTO: 10 %
MAGNESIUM SERPL-MCNC: 2.3 MG/DL (ref 1.7–2.3)
MCH RBC QN AUTO: 30.3 PG (ref 26.5–33)
MCHC RBC AUTO-ENTMCNC: 30.3 G/DL (ref 31.5–36.5)
MCV RBC AUTO: 100 FL (ref 78–100)
MONOCYTES # BLD AUTO: 0.4 10E3/UL (ref 0–1.3)
MONOCYTES NFR BLD AUTO: 8 %
NEUTROPHILS # BLD AUTO: 4.5 10E3/UL (ref 1.6–8.3)
NEUTROPHILS NFR BLD AUTO: 81 %
NRBC # BLD AUTO: 0 10E3/UL
NRBC BLD AUTO-RTO: 0 /100
NT-PROBNP SERPL-MCNC: ABNORMAL PG/ML (ref 0–1800)
O2/TOTAL GAS SETTING VFR VENT: 40 %
OXYHGB MFR BLDV: 52 % (ref 70–75)
PCO2 BLDV: 68 MM HG (ref 40–50)
PH BLDV: 7.26 [PH] (ref 7.32–7.43)
PHOSPHATE SERPL-MCNC: 4.6 MG/DL (ref 2.5–4.5)
PLATELET # BLD AUTO: 158 10E3/UL (ref 150–450)
PO2 BLDV: 32 MM HG (ref 25–47)
POTASSIUM SERPL-SCNC: 4.8 MMOL/L (ref 3.4–5.3)
RBC # BLD AUTO: 2.31 10E6/UL (ref 3.8–5.2)
RSV RNA SPEC NAA+PROBE: NEGATIVE
SAO2 % BLDV: 53 % (ref 70–75)
SARS-COV-2 RNA RESP QL NAA+PROBE: NEGATIVE
SODIUM SERPL-SCNC: 135 MMOL/L (ref 135–145)
TROPONIN T SERPL HS-MCNC: 76 NG/L
TROPONIN T SERPL HS-MCNC: 83 NG/L
WBC # BLD AUTO: 5.6 10E3/UL (ref 4–11)

## 2025-03-09 PROCEDURE — 80051 ELECTROLYTE PANEL: CPT | Performed by: EMERGENCY MEDICINE

## 2025-03-09 PROCEDURE — 85025 COMPLETE CBC W/AUTO DIFF WBC: CPT | Performed by: EMERGENCY MEDICINE

## 2025-03-09 PROCEDURE — 999N000157 HC STATISTIC RCP TIME EA 10 MIN

## 2025-03-09 PROCEDURE — 99223 1ST HOSP IP/OBS HIGH 75: CPT | Performed by: NURSE PRACTITIONER

## 2025-03-09 PROCEDURE — 82805 BLOOD GASES W/O2 SATURATION: CPT | Performed by: INTERNAL MEDICINE

## 2025-03-09 PROCEDURE — 85014 HEMATOCRIT: CPT | Performed by: EMERGENCY MEDICINE

## 2025-03-09 PROCEDURE — 99285 EMERGENCY DEPT VISIT HI MDM: CPT | Mod: 25

## 2025-03-09 PROCEDURE — 93005 ELECTROCARDIOGRAM TRACING: CPT

## 2025-03-09 PROCEDURE — 93010 ELECTROCARDIOGRAM REPORT: CPT | Performed by: INTERNAL MEDICINE

## 2025-03-09 PROCEDURE — 5A09357 ASSISTANCE WITH RESPIRATORY VENTILATION, LESS THAN 24 CONSECUTIVE HOURS, CONTINUOUS POSITIVE AIRWAY PRESSURE: ICD-10-PCS

## 2025-03-09 PROCEDURE — 250N000012 HC RX MED GY IP 250 OP 636 PS 637: Performed by: NURSE PRACTITIONER

## 2025-03-09 PROCEDURE — 87637 SARSCOV2&INF A&B&RSV AMP PRB: CPT | Performed by: EMERGENCY MEDICINE

## 2025-03-09 PROCEDURE — 83735 ASSAY OF MAGNESIUM: CPT | Performed by: NURSE PRACTITIONER

## 2025-03-09 PROCEDURE — 80048 BASIC METABOLIC PNL TOTAL CA: CPT | Performed by: EMERGENCY MEDICINE

## 2025-03-09 PROCEDURE — 71046 X-RAY EXAM CHEST 2 VIEWS: CPT

## 2025-03-09 PROCEDURE — 94660 CPAP INITIATION&MGMT: CPT

## 2025-03-09 PROCEDURE — 99222 1ST HOSP IP/OBS MODERATE 55: CPT | Performed by: INTERNAL MEDICINE

## 2025-03-09 PROCEDURE — 83880 ASSAY OF NATRIURETIC PEPTIDE: CPT | Performed by: EMERGENCY MEDICINE

## 2025-03-09 PROCEDURE — 36415 COLL VENOUS BLD VENIPUNCTURE: CPT | Performed by: INTERNAL MEDICINE

## 2025-03-09 PROCEDURE — 84100 ASSAY OF PHOSPHORUS: CPT | Performed by: NURSE PRACTITIONER

## 2025-03-09 PROCEDURE — 250N000013 HC RX MED GY IP 250 OP 250 PS 637: Performed by: NURSE PRACTITIONER

## 2025-03-09 PROCEDURE — 84484 ASSAY OF TROPONIN QUANT: CPT | Performed by: EMERGENCY MEDICINE

## 2025-03-09 PROCEDURE — 71045 X-RAY EXAM CHEST 1 VIEW: CPT

## 2025-03-09 PROCEDURE — 120N000013 HC R&B IMCU

## 2025-03-09 PROCEDURE — 36415 COLL VENOUS BLD VENIPUNCTURE: CPT | Performed by: EMERGENCY MEDICINE

## 2025-03-09 PROCEDURE — 99291 CRITICAL CARE FIRST HOUR: CPT

## 2025-03-09 RX ORDER — CARBOXYMETHYLCELLULOSE SODIUM 5 MG/ML
1 SOLUTION/ DROPS OPHTHALMIC
Status: DISCONTINUED | OUTPATIENT
Start: 2025-03-09 | End: 2025-03-17 | Stop reason: HOSPADM

## 2025-03-09 RX ORDER — AMOXICILLIN 250 MG
1 CAPSULE ORAL 2 TIMES DAILY PRN
Status: DISCONTINUED | OUTPATIENT
Start: 2025-03-09 | End: 2025-03-17 | Stop reason: HOSPADM

## 2025-03-09 RX ORDER — DEXTROSE MONOHYDRATE 25 G/50ML
25-50 INJECTION, SOLUTION INTRAVENOUS
Status: DISCONTINUED | OUTPATIENT
Start: 2025-03-09 | End: 2025-03-09

## 2025-03-09 RX ORDER — NICOTINE POLACRILEX 4 MG
15-30 LOZENGE BUCCAL
Status: DISCONTINUED | OUTPATIENT
Start: 2025-03-09 | End: 2025-03-17 | Stop reason: HOSPADM

## 2025-03-09 RX ORDER — SERTRALINE HYDROCHLORIDE 25 MG/1
25 TABLET, FILM COATED ORAL DAILY
Status: DISCONTINUED | OUTPATIENT
Start: 2025-03-09 | End: 2025-03-13

## 2025-03-09 RX ORDER — ONDANSETRON 4 MG/1
4 TABLET, ORALLY DISINTEGRATING ORAL EVERY 6 HOURS PRN
Status: DISCONTINUED | OUTPATIENT
Start: 2025-03-09 | End: 2025-03-17 | Stop reason: HOSPADM

## 2025-03-09 RX ORDER — ATORVASTATIN CALCIUM 20 MG/1
20 TABLET, FILM COATED ORAL EVERY EVENING
Status: DISCONTINUED | OUTPATIENT
Start: 2025-03-09 | End: 2025-03-17 | Stop reason: HOSPADM

## 2025-03-09 RX ORDER — LIDOCAINE 40 MG/G
CREAM TOPICAL
Status: DISCONTINUED | OUTPATIENT
Start: 2025-03-09 | End: 2025-03-17 | Stop reason: HOSPADM

## 2025-03-09 RX ORDER — DEXTROSE MONOHYDRATE 25 G/50ML
25-50 INJECTION, SOLUTION INTRAVENOUS
Status: DISCONTINUED | OUTPATIENT
Start: 2025-03-09 | End: 2025-03-17 | Stop reason: HOSPADM

## 2025-03-09 RX ORDER — CALCIUM CARBONATE 500 MG/1
1000 TABLET, CHEWABLE ORAL 4 TIMES DAILY PRN
Status: DISCONTINUED | OUTPATIENT
Start: 2025-03-09 | End: 2025-03-17 | Stop reason: HOSPADM

## 2025-03-09 RX ORDER — CALCITRIOL 0.25 UG/1
0.5 CAPSULE, LIQUID FILLED ORAL
Status: DISCONTINUED | OUTPATIENT
Start: 2025-03-10 | End: 2025-03-17

## 2025-03-09 RX ORDER — ALBUTEROL SULFATE 90 UG/1
2 INHALANT RESPIRATORY (INHALATION) EVERY 6 HOURS PRN
Status: DISCONTINUED | OUTPATIENT
Start: 2025-03-09 | End: 2025-03-17 | Stop reason: HOSPADM

## 2025-03-09 RX ORDER — AMLODIPINE BESYLATE 10 MG/1
10 TABLET ORAL
Status: DISCONTINUED | OUTPATIENT
Start: 2025-03-09 | End: 2025-03-17 | Stop reason: HOSPADM

## 2025-03-09 RX ORDER — AMOXICILLIN 250 MG
2 CAPSULE ORAL 2 TIMES DAILY PRN
Status: DISCONTINUED | OUTPATIENT
Start: 2025-03-09 | End: 2025-03-17 | Stop reason: HOSPADM

## 2025-03-09 RX ORDER — AMIODARONE HYDROCHLORIDE 200 MG/1
200 TABLET ORAL 2 TIMES DAILY
Status: DISCONTINUED | OUTPATIENT
Start: 2025-03-09 | End: 2025-03-17 | Stop reason: HOSPADM

## 2025-03-09 RX ORDER — CETIRIZINE HYDROCHLORIDE 10 MG/1
10 TABLET ORAL AT BEDTIME
Status: DISCONTINUED | OUTPATIENT
Start: 2025-03-09 | End: 2025-03-17 | Stop reason: HOSPADM

## 2025-03-09 RX ORDER — MIDODRINE HYDROCHLORIDE 5 MG/1
5 TABLET ORAL
Status: DISCONTINUED | OUTPATIENT
Start: 2025-03-10 | End: 2025-03-17 | Stop reason: HOSPADM

## 2025-03-09 RX ORDER — OLOPATADINE HYDROCHLORIDE 1 MG/ML
1 SOLUTION/ DROPS OPHTHALMIC 2 TIMES DAILY PRN
Status: DISCONTINUED | OUTPATIENT
Start: 2025-03-09 | End: 2025-03-17 | Stop reason: HOSPADM

## 2025-03-09 RX ORDER — ACETAMINOPHEN 325 MG/1
650 TABLET ORAL EVERY 4 HOURS PRN
Status: DISCONTINUED | OUTPATIENT
Start: 2025-03-09 | End: 2025-03-17 | Stop reason: HOSPADM

## 2025-03-09 RX ORDER — IPRATROPIUM BROMIDE AND ALBUTEROL SULFATE 2.5; .5 MG/3ML; MG/3ML
3 SOLUTION RESPIRATORY (INHALATION) EVERY 4 HOURS PRN
Status: DISCONTINUED | OUTPATIENT
Start: 2025-03-09 | End: 2025-03-17 | Stop reason: HOSPADM

## 2025-03-09 RX ORDER — SEVELAMER CARBONATE 800 MG/1
800 TABLET, FILM COATED ORAL 2 TIMES DAILY PRN
Status: DISCONTINUED | OUTPATIENT
Start: 2025-03-09 | End: 2025-03-13

## 2025-03-09 RX ORDER — ONDANSETRON 2 MG/ML
4 INJECTION INTRAMUSCULAR; INTRAVENOUS EVERY 6 HOURS PRN
Status: DISCONTINUED | OUTPATIENT
Start: 2025-03-09 | End: 2025-03-17 | Stop reason: HOSPADM

## 2025-03-09 RX ORDER — AMLODIPINE BESYLATE 10 MG/1
10 TABLET ORAL DAILY
Status: ON HOLD | COMMUNITY
End: 2025-03-16

## 2025-03-09 RX ORDER — FUROSEMIDE 40 MG/1
80 TABLET ORAL DAILY
Status: DISCONTINUED | OUTPATIENT
Start: 2025-03-09 | End: 2025-03-14

## 2025-03-09 RX ORDER — NICOTINE POLACRILEX 4 MG
15-30 LOZENGE BUCCAL
Status: DISCONTINUED | OUTPATIENT
Start: 2025-03-09 | End: 2025-03-09

## 2025-03-09 RX ADMIN — HYDRALAZINE HYDROCHLORIDE 75 MG: 50 TABLET ORAL at 22:09

## 2025-03-09 RX ADMIN — ATORVASTATIN CALCIUM 20 MG: 20 TABLET, FILM COATED ORAL at 20:33

## 2025-03-09 RX ADMIN — AMIODARONE HYDROCHLORIDE 200 MG: 200 TABLET ORAL at 22:09

## 2025-03-09 RX ADMIN — APIXABAN 5 MG: 5 TABLET, FILM COATED ORAL at 22:09

## 2025-03-09 RX ADMIN — SERTRALINE HYDROCHLORIDE 50 MG: 50 TABLET ORAL at 20:33

## 2025-03-09 RX ADMIN — SERTRALINE HYDROCHLORIDE 25 MG: 25 TABLET ORAL at 20:35

## 2025-03-09 RX ADMIN — AMLODIPINE BESYLATE 10 MG: 10 TABLET ORAL at 20:33

## 2025-03-09 RX ADMIN — FUROSEMIDE 80 MG: 40 TABLET ORAL at 20:33

## 2025-03-09 RX ADMIN — CETIRIZINE HYDROCHLORIDE 10 MG: 10 TABLET, FILM COATED ORAL at 22:09

## 2025-03-09 RX ADMIN — INSULIN GLARGINE 15 UNITS: 100 INJECTION, SOLUTION SUBCUTANEOUS at 22:18

## 2025-03-09 ASSESSMENT — ACTIVITIES OF DAILY LIVING (ADL)
ADLS_ACUITY_SCORE: 61
ADLS_ACUITY_SCORE: 66
ADLS_ACUITY_SCORE: 61
ADLS_ACUITY_SCORE: 78
ADLS_ACUITY_SCORE: 78
ADLS_ACUITY_SCORE: 61
ADLS_ACUITY_SCORE: 66

## 2025-03-09 NOTE — ED NOTES
Patient boosted in bed. Anxious affect, daughter at bedside, telling patient to breathe. Patient super anxious about laying flat.

## 2025-03-09 NOTE — CODE/RAPID RESPONSE
[facility]    RRT Note  3/9/2025   Time Called: 1758    Code Status: Full Code    I was called to evaluate Supriya Herr, who is a 75 year old female who was admitted on 3/9/2025 for acute on chronic dyspnea, nausea, and diaphoresis. PMH includes CHF, anemia and CKD, anxiety, depression, former tobacco use, hypertension, dyslipidemia, obesity, JONE, s/p traumatic amputation of the leg, DM2, history of noncompliance with medical treatment .    Assessment & Plan   Acute hypoxic respiratory failure 2/2 pulmonary edema  I was paged to come evaluate Ms. Herr as part of a rapid response for acutely worsening hypoxic respiratory failure. Ms. Herr has a complex medical history and has been hospitalized multiple time within the last year. Her most recent hospitalization was from 3/2/2025-3/6/2025 due to concerns for shortness of breath. She was ultimately treated for acute respiratory failure 2/2 pneumonia, heart failure and A-fib and discharged to a transitional care unit. She presents again on 3/9/2025 from her TCU where staff found her to be hypoxic with oxygen saturation in the 80s. Initial chest x-ray showed bilateral interstitial and alveolar opacities, small pleural effusions, mild cardiomegaly, findings suggestive of pulmonary edema and volume overload. In the ED, she was oxygenating well on 2 liters nasal canula. However, shortly after transferring up the the floor, she became hypoxic, with oxygen saturations in the 80s, despite being on 15 liters of supplemental O2, prompting the RN to call an RRT.      Upon my arrival, Ms. Herr was laying in the bed and appeared to be in acute distress. She was hypoxic and tachypnic with RR in the 40s. HR 88 and SBP of 166. She was immediately placed on bipap, after which her oxygenation and respiratory effort improved. She denies any chest pain, abdominal pain or nausea, but endorses shortness of breath. I had an extensive conversation  regarding code status and overall goals of care with both her and her daughter, who was at the bedside during the encounter. The decision was ultimately made to change her code status to DNR, with pre-arrest intubation OK.  A chest x-ray was completed showing increased interstitial opacities when compared to the one completed earlier in the day, indicating potential worsening of her pulmonary edema. Unfortunately, due to her ESRD, she is anuric so diuresis would not be an option. However, she was seen by nephrology down in the ED (see consultation note for more details), and is scheduled to have HD tomorrow. Plan in the interim is to continue to support her on bipap and transfer to a higher level of care. I discussed my findings and plan with the attending Hospitalist.         INTERVENTIONS:  -Chest x-ray  -ABG  -Bipap  -Goals of care discussion    Discussed with and defer further cares to Hospitalist Celine Damon    Upon completion of the encounter, Ms. Herr is hemodynamically stable, oxygenating well and appears comfortable. She will [facility] to [facility].    Kurt Escobar NP  [facility]  Securely message with the Vocera Web Console (learn more here)  Text page via [employer] Paging/Directory    Physical Exam   Vital Signs with Ranges:  Temp:  [97.8  F (36.6  C)-98  F (36.7  C)] 98  F (36.7  C)  Pulse:  [73-82] 79  Resp:  [18-44] 44  BP: (109-166)/(40-54) 155/54  SpO2:  [77 %-99 %] 98 %  No intake/output data recorded.    Physical Exam  Constitutional:       General: She is awake. She is in acute distress.   Cardiovascular:      Rate and Rhythm: Regular rhythm. Tachycardia present.      Pulses: Normal pulses.      Heart sounds: Normal heart sounds.   Pulmonary:      Effort: Tachypnea and accessory muscle usage present.      Breath sounds: Decreased air movement present.   Abdominal:      General: Bowel sounds are decreased.      Palpations: Abdomen is soft.      Tenderness: There is no  abdominal tenderness.   Skin:     General: Skin is warm and moist.   Neurological:      General: No focal deficit present.      Mental Status: She is alert.   Psychiatric:         Attention and Perception: Attention normal.         Mood and Affect: Mood is anxious.         Speech: Speech is rapid and pressured.         Behavior: Behavior is cooperative.     Data   IMAGING: (X-ray/CT/MRI)   Recent Results (from the past 24 hours)   XR Chest 2 Views    Narrative    EXAM: XR CHEST 2 VIEWS  LOCATION: [facility]  DATE: 3/9/2025    INDICATION: Dyspnea.  COMPARISON: 3/2/2025.      Impression    IMPRESSION: Bilateral interstitial and alveolar opacities with mid lower lung predominance. Small pleural effusions. Mild cardiomegaly. Cumulative findings suggest pulmonary edema and volume overload. Basilar atelectasis. No pneumothorax.         CBC with Diff:  Recent Labs   Lab Test 03/09/25  1050 02/15/25  0842 02/14/25  1421   WBC 5.6   < > 4.4   HGB 7.0*   < > 8.4*      < > 98      < > 184   INR  --   --  1.57*    < > = values in this interval not displayed.      Absolute Retic (10e9/L)   Date Value   11/05/2017 40.0     % Retic (%)   Date Value   11/05/2017 1.5       Comprehensive Metabolic Panel:  Recent Labs   Lab 03/09/25  1759 03/09/25  1301 03/09/25  1050   NA  --   --  135   POTASSIUM  --   --  4.8   CHLORIDE  --   --  97*   CO2  --   --  27   ANIONGAP  --   --  11   *  --  343*   BUN  --   --  47.6*   CR  --   --  3.78*   GFRESTIMATED  --   --  12*   JODY  --   --  9.9   MAG  --  2.3  --    PHOS  --  4.6*  --          Time Spent on this Encounter   CRITICAL CARE TIME  I spent 30 minutes of critical care time on the unit/floor managing the care of Supriya Herr. Upon evaluation, this patient had a high probability of imminent or life-threatening deterioration which required my direct attention, intervention, and personal management. 100% of my time was spent at the bedside  counseling the patient and/or coordinating care regarding services listed in this note.

## 2025-03-09 NOTE — ED NOTES
"[facility]  ED Nurse Handoff Report    ED Chief complaint: Shortness of Breath, Nausea, and Hypertension      ED Diagnosis:   Final diagnoses:   None       Code Status:  Per the admitting provider     Allergies:   Allergies   Allergen Reactions    Ampicillin-Sulbactam Sodium Rash     No evidence SJS, but very uncomfortable and precipitated multiple provider visits. Would not use penicillins again if other options available.     Penicillins Rash       Patient Story:   Per EMS pt with increased SOB, nausea no emesis elevated BP. Per EMS, pt desaat to the 85% on 4L/NC at the Care [facility]. EMS placed pt on re-breather mask  10L , O2 improved.Hx: CHF, COPD, Pneumonia, DM, Lt AKA, ESRD, dialysis Monday, Wednesday and Friday, Pt daughter reports pt was dialyzed on \"Friday\".     Focused Assessment:  A/o x 4 with forgetfulness. Place on Diminished breath sound on auscultation. Pt placed . Transitioned to O2 6L/NC, Sats 97 to 98%. Elevated BNP, and Troponin. Repeat Troponin in process. Pt denied chest pain. Abd distended, + BS x all four quadrants. Peg tube RUQ dressing CDI,. No drainage noted.     Treatments and/or interventions provided: Lab, EKG, XR, Oxygen  Results for orders placed or performed during the hospital encounter of 03/09/25   XR Chest 2 Views     Status: None    Narrative    EXAM: XR CHEST 2 VIEWS  LOCATION: [facility]  DATE: 3/9/2025    INDICATION: Dyspnea.  COMPARISON: 3/2/2025.      Impression    IMPRESSION: Bilateral interstitial and alveolar opacities with mid lower lung predominance. Small pleural effusions. Mild cardiomegaly. Cumulative findings suggest pulmonary edema and volume overload. Basilar atelectasis. No pneumothorax.     Basic metabolic panel     Status: Abnormal   Result Value Ref Range    Sodium 135 135 - 145 mmol/L    Potassium 4.8 3.4 - 5.3 mmol/L    Chloride 97 (L) 98 - 107 mmol/L    Carbon Dioxide (CO2) 27 22 - 29 mmol/L    Anion Gap 11 7 - 15 " mmol/L    Urea Nitrogen 47.6 (H) 8.0 - 23.0 mg/dL    Creatinine 3.78 (H) 0.51 - 0.95 mg/dL    GFR Estimate 12 (L) >60 mL/min/1.73m2    Calcium 9.9 8.8 - 10.4 mg/dL    Glucose 343 (H) 70 - 99 mg/dL   Troponin T, High Sensitivity     Status: Abnormal   Result Value Ref Range    Troponin T, High Sensitivity 76 (H) <=14 ng/L   Nt probnp inpatient (BNP)     Status: Abnormal   Result Value Ref Range    N terminal Pro BNP Inpatient 33,144 (H) 0 - 1,800 pg/mL   Influenza A/B, RSV and SARS-CoV2 PCR (COVID-19) Nasopharyngeal     Status: Normal    Specimen: Nasopharyngeal; Swab   Result Value Ref Range    Influenza A PCR Negative Negative    Influenza B PCR Negative Negative    RSV PCR Negative Negative    SARS CoV2 PCR Negative Negative    Narrative    Testing was performed using the Xpert Xpress CoV2/Flu/RSV Assay on the [employer] GeneXpert Instrument. This test should be ordered for the detection of SARS-CoV2, influenza, and RSV viruses in individuals with signs and symptoms of respiratory tract infection. This test is for in vitro diagnostic use under the [place] FDA for laboratories certified under CLIA to perform high or moderate complexity testing. This test has been [place] FDA cleared. A negative result does not rule out the presence of PCR inhibitors in the specimen or target RNA in concentration below the limit of detection for the assay. If only one viral target is positive but coinfection with multiple targets is suspected, the sample should be re-tested with another FDA cleared, approved, or authorized test, if coninfection would change clinical management. This test was validated by the [facility]. These laboratories are certified under the Clinical Laboratory Improvement Amendments of 1988 (CLIA-88) as qualified to perfom high complexity laboratory testing.   CBC with platelets and differential     Status: Abnormal   Result Value Ref Range    WBC Count 5.6 4.0 - 11.0 10e3/uL    RBC Count 2.31 (L) 3.80 -  5.20 10e6/uL    Hemoglobin 7.0 (L) 11.7 - 15.7 g/dL    Hematocrit 23.1 (L) 35.0 - 47.0 %     78 - 100 fL    MCH 30.3 26.5 - 33.0 pg    MCHC 30.3 (L) 31.5 - 36.5 g/dL    RDW 16.2 (H) 10.0 - 15.0 %    Platelet Count 158 150 - 450 10e3/uL    % Neutrophils 81 %    % Lymphocytes 10 %    % Monocytes 8 %    % Eosinophils 1 %    % Basophils 1 %    % Immature Granulocytes 0 %    NRBCs per 100 WBC 0 <1 /100    Absolute Neutrophils 4.5 1.6 - 8.3 10e3/uL    Absolute Lymphocytes 0.5 (L) 0.8 - 5.3 10e3/uL    Absolute Monocytes 0.4 0.0 - 1.3 10e3/uL    Absolute Eosinophils 0.0 0.0 - 0.7 10e3/uL    Absolute Basophils 0.0 0.0 - 0.2 10e3/uL    Absolute Immature Granulocytes 0.0 <=0.4 10e3/uL    Absolute NRBCs 0.0 10e3/uL   EKG 12-lead, tracing only     Status: None (Preliminary result)   Result Value Ref Range    Systolic Blood Pressure  mmHg    Diastolic Blood Pressure  mmHg    Ventricular Rate 68 BPM    Atrial Rate 68 BPM    MD Interval 166 ms    QRS Duration 96 ms     ms    QTc 442 ms    P Axis 41 degrees    R AXIS 74 degrees    T Axis -9 degrees    Interpretation ECG       Sinus rhythm  Nonspecific ST and T wave abnormality  Abnormal ECG  When compared with ECG of 02-Mar-2025 15:43,  ST now depressed in Lateral leads  Nonspecific T wave abnormality, improved in Lateral leads     CBC with platelets differential     Status: Abnormal    Narrative    The following orders were created for panel order CBC with platelets differential.  Procedure                               Abnormality         Status                     ---------                               -----------         ------                     CBC with platelets and ...[[id number]]  Abnormal            Final result                 Please view results for these tests on the individual orders.      Patient's response to treatments and/or interventions: Stable     To be done/followed up on inpatient unit:  See order    Does this patient have any cognitive  "concerns?: Forgetful    Activity level - Baseline/Home:  Total Care  Activity Level - Current:   Total Care    Patient's Preferred language: English   Needed?: No    Isolation: None and COVID r/o and special precautions    Patient tested for COVID 19 prior to admission: YES  Bariatric?: No    Vital Signs:   Vitals:    03/09/25 1006 03/09/25 1100 03/09/25 1145 03/09/25 1245   BP: 117/40 118/40 109/41    Pulse: 76 73 76    Resp: 24 20 18    Temp: 97.8  F (36.6  C)      TempSrc: Temporal      SpO2: 99% 98% 94%    Weight: 99.8 kg (220 lb)   102 kg (224 lb 14.4 oz)   Height: 1.651 m (5' 5\")          Cardiac Rhythm: NSR    Was the PSS-3 completed:   Yes  What interventions are required if any?               Family Comments:  Daughter at the bedside   OBS brochure/video discussed/provided to patient/family: Yes              Name of person given brochure if not patient: N/A               Relationship to patient: N/a     For the majority of the shift this patient's behavior was Green.   Behavioral interventions performed were N/A .    ED NURSE PHONE NUMBER: *[id number]        "
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
## 2025-03-09 NOTE — ED NOTES
Bed: ED09  Expected date:   Expected time:   Means of arrival:   Comments:  HEMS 414 [id number] CHF exacerbation

## 2025-03-09 NOTE — PHARMACY-ADMISSION MEDICATION HISTORY
Pharmacy Intern Admission Medication History    Admission medication history is complete. The information provided in this note is only as accurate as the sources available at the time of the update.    Information Source(s): Caregiver, Facility (TCU/NH/) medication list/MAR, and CareEverywhere/SureScripts via phone    Pertinent Information: Verified last doses with RN from [facility] at [phone]    Changes made to PTA medication list:  Added: None  Deleted:   Amlodipine 5 mg--> per RN pt has been taking 10 mg tablet  Changed:   Amlodipine:pt is taking on every Tue, Thu, Sat, Sun    Allergies reviewed with patient and updates made in EHR: yes    Medication History Completed By: Jaime Lua 3/9/2025 3:02 PM    PTA Med List   Medication Sig Last Dose/Taking    acetaminophen (TYLENOL) 325 MG tablet Take 2 tablets (650 mg) by mouth every 4 hours as needed for mild pain Taking As Needed    albuterol (PROAIR HFA/PROVENTIL HFA/VENTOLIN HFA) 108 (90 Base) MCG/ACT inhaler Inhale 2 puffs into the lungs every 6 hours as needed for shortness of breath / dyspnea or wheezing Taking As Needed    amiodarone (PACERONE) 200 MG tablet Take 1 tablet (200 mg) by mouth 2 times daily. 3/8/2025 Evening    amLODIPine (NORVASC) 10 MG tablet Take 10 mg by mouth daily. Take 1 table  by mouth once daily on every Tue, Thu, Sa, Sun Taking    apixaban ANTICOAGULANT (ELIQUIS) 5 MG tablet Take 1 tablet (5 mg) by mouth or Feeding Tube 2 times daily. 3/8/2025 Bedtime    atorvastatin (LIPITOR) 20 MG tablet Take 1 tablet (20 mg) by mouth every evening. 3/8/2025 Evening    calcitRIOL (ROCALTROL) 0.5 MCG capsule Take 1 capsule (0.5 mcg) by mouth three times a week. Given at dialysis 3/6/2025    cetirizine (ZYRTEC) 10 MG tablet Take 10 mg by mouth at bedtime. 3/8/2025 Bedtime    furosemide (LASIX) 80 MG tablet Take 1 tablet (80 mg) by mouth or Feeding Tube daily. 3/8/2025 Morning    Glucagon (BAQSIMI ONE PACK) 3  MG/DOSE POWD Spray 3 mg in nostril See Admin Instructions USE ONLY FOR SEVERE HYPOGLYCEMIA. Taking    hydrALAZINE (APRESOLINE) 25 MG tablet Take 3 tablets (75 mg) by mouth every 8 hours. 3/8/2025 Evening    insulin glargine (LANTUS PEN) 100 UNIT/ML pen Inject 10 Units subcutaneously 2 times daily. (Patient taking differently: Inject 15 Units subcutaneously 2 times daily.) 3/9/2025 Morning    ipratropium - albuterol 0.5 mg/2.5 mg/3 mL (DUONEB) 0.5-2.5 (3) MG/3ML neb solution Take 1 vial (3 mLs) by nebulization every 4 hours as needed for wheezing or shortness of breath. 3/9/2025 Morning    miconazole (MICATIN) 2 % external powder Apply topically 2 times daily as needed (redness under breasts/groin) Apply twice daily to skin folds as needed Taking As Needed    midodrine (PROAMATINE) 5 MG tablet Take 1 tablet (5 mg) by mouth three times a week. (Prior to dialysis) (Patient taking differently: Take 5 mg by mouth three times a week. Mon, Wed, and Fri (Prior to dialysis)) 3/7/2025    olopatadine (PATANOL) 0.1 % ophthalmic solution Place 1 drop into both eyes 2 times daily as needed for allergies. Taking As Needed    pantoprazole (PROTONIX) 2 mg/mL SUSP suspension Place 20 mLs (40 mg) into Feeding Tube every morning (before breakfast). 3/9/2025 Morning    sertraline (ZOLOFT) 25 MG tablet TAKE 1  TABLET BY MOUTH EVERY DAY along with a 50 mg tablet for a total of 75 mg 3/8/2025 Morning    sertraline (ZOLOFT) 50 MG tablet Take 1 tablet (50 mg) by mouth at bedtime 3/8/2025 Morning    sevelamer carbonate (RENVELA) 800 MG tablet Take two with meals and one with snacks (Patient taking differently: Take 800 mg by mouth 2 times daily as needed (with snacks).) Taking Differently    tacrolimus (PROTOPIC) 0.1 % external ointment Apply topically 2 times daily. To skin folds Taking    triamcinolone (KENALOG) 0.025 % external ointment Apply topically 2 times daily. To rash under breasts and groin as needed Taking    Vitamin D3 50 mcg  (2000 units) tablet TAKE ONE TABLET BY MOUTH ONCE DAILY Taking
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
## 2025-03-09 NOTE — ED TRIAGE NOTES
BIBA from [facility]. Per EMS pt with SOB, nausea no emesis and elevated BP of 158/92.  Denied chest pain Sat on 4L @ the Facility was 85% . Pt placed on Re breather 10L, Sats 100%.  Blood sugar @ the Facility = 300. Pt given Lantus . Ems obtained  was 320. 18G RAC, NTG total of 8 mg SL given for elevated BP. BP decreased to 120/60. Hx: COPD, CHF, O2 2L/NC, Dialysis M, W, F, was last dialyzed on Friday. Fistula Lt am.        Triage Assessment (Adult)       Row Name 03/09/25 1007          Triage Assessment    Airway WDL WDL        Respiratory WDL    Respiratory WDL WDL;X  SOB        Skin Circulation/Temperature WDL    Skin Circulation/Temperature WDL WDL;X  Pale        Cardiac WDL    Cardiac WDL WDL        Peripheral/Neurovascular WDL    Peripheral Neurovascular WDL WDL        Cognitive/Neuro/Behavioral WDL    Cognitive/Neuro/Behavioral WDL WDL                 Addendum: Pt noted to have bilateral arms bruises, Lt AKA, Lt arm fistula with + bruit and thrill . Pt observing Dex com on rt deltoid and has a PEG tube LUQ, Dressing CDI.

## 2025-03-09 NOTE — PROVIDER NOTIFICATION
Placed patient on BiPAP S/T during RRT for increased WOB and oxygen requirements. On arrival patient was diaphoretic on 15 LPM oxymask with SpO2 at 91% and RR in the mid to upper 40's. After BiPAP placement RR now in the mid 20's with SpO2 in the mid 90's. The following settings, patient parameters and alarms are listed below:      03/09/25 1815   CPAP/BiPAP/Settings   BIPAP/CPAP On Standby On   IPAP/EPAP (cmH2O) 12/5   Rate (breaths/min) 18   Oxygen (%) 40   Timed Inspiration (sec) 0.85   IPAP RISE  Settings (V60) 2   CPAP/BiPAP Patient Parameters   IPAP (cm H2O) 12 cmH2O   EPAP (cm H2O) 5 cmH2O   Pressure Support (cm H2O) 7 cmH2O   RR Total (breaths/min) 26 breaths/min   Vt (mL) 447 mL   Minute Ventilation (L/min) 12.2 L/min   Peak Inspiratory Pressure (cm H2O) 13 cmH2O   Pt.  Leak (L/min) 27 L/min   CPAP/BiPAP/AVAPS/AVAPS AE Alarms   High Pressure (cm H2O) 25 cmH2O   Low Pressure (cm H2O) 5   Apnea (sec) 20   Lo Min Vent 2   High Rate (breaths/min) 45 breaths/min   Low Rate (breaths/min) 5   Audible Alarm set at (Volume of Alarm) 10     Patient utilizing a size medium facemask with minimal leak. Plan for ABG 1 hour after BiPAP placement.     Niraj Nava, RT on 3/9/2025 at 6:32 PM
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
## 2025-03-10 LAB
ABO + RH BLD: NORMAL
ALBUMIN SERPL BCG-MCNC: 3.1 G/DL (ref 3.5–5.2)
ALP SERPL-CCNC: 73 U/L (ref 40–150)
ALT SERPL W P-5'-P-CCNC: 9 U/L (ref 0–50)
ANION GAP SERPL CALCULATED.3IONS-SCNC: 11 MMOL/L (ref 7–15)
AST SERPL W P-5'-P-CCNC: 12 U/L (ref 0–45)
ATRIAL RATE - MUSE: 68 BPM
ATRIAL RATE - MUSE: 92 BPM
BASE EXCESS BLDV CALC-SCNC: 2 MMOL/L (ref -3–3)
BASOPHILS # BLD AUTO: 0 10E3/UL (ref 0–0.2)
BASOPHILS NFR BLD AUTO: 0 %
BILIRUB SERPL-MCNC: 0.3 MG/DL
BLD GP AB SCN SERPL QL: NEGATIVE
BLD PROD TYP BPU: NORMAL
BLOOD COMPONENT TYPE: NORMAL
BUN SERPL-MCNC: 56.6 MG/DL (ref 8–23)
CALCIUM SERPL-MCNC: 9.9 MG/DL (ref 8.8–10.4)
CHLORIDE SERPL-SCNC: 101 MMOL/L (ref 98–107)
CODING SYSTEM: NORMAL
CREAT SERPL-MCNC: 4.79 MG/DL (ref 0.51–0.95)
CROSSMATCH: NORMAL
DIASTOLIC BLOOD PRESSURE - MUSE: NORMAL MMHG
DIASTOLIC BLOOD PRESSURE - MUSE: NORMAL MMHG
EGFRCR SERPLBLD CKD-EPI 2021: 9 ML/MIN/1.73M2
EOSINOPHIL # BLD AUTO: 0.1 10E3/UL (ref 0–0.7)
EOSINOPHIL NFR BLD AUTO: 3 %
ERYTHROCYTE [DISTWIDTH] IN BLOOD BY AUTOMATED COUNT: 16.1 % (ref 10–15)
GLUCOSE BLDC GLUCOMTR-MCNC: 106 MG/DL (ref 70–99)
GLUCOSE BLDC GLUCOMTR-MCNC: 113 MG/DL (ref 70–99)
GLUCOSE BLDC GLUCOMTR-MCNC: 115 MG/DL (ref 70–99)
GLUCOSE BLDC GLUCOMTR-MCNC: 63 MG/DL (ref 70–99)
GLUCOSE BLDC GLUCOMTR-MCNC: 78 MG/DL (ref 70–99)
GLUCOSE BLDC GLUCOMTR-MCNC: 81 MG/DL (ref 70–99)
GLUCOSE BLDC GLUCOMTR-MCNC: 83 MG/DL (ref 70–99)
GLUCOSE SERPL-MCNC: 87 MG/DL (ref 70–99)
HCO3 BLDV-SCNC: 29 MMOL/L (ref 21–28)
HCO3 SERPL-SCNC: 26 MMOL/L (ref 22–29)
HCT VFR BLD AUTO: 21.9 % (ref 35–47)
HGB BLD-MCNC: 6.8 G/DL (ref 11.7–15.7)
HGB BLD-MCNC: 8.3 G/DL (ref 11.7–15.7)
HOLD SPECIMEN: NORMAL
IMM GRANULOCYTES # BLD: 0 10E3/UL
IMM GRANULOCYTES NFR BLD: 0 %
INTERPRETATION ECG - MUSE: NORMAL
INTERPRETATION ECG - MUSE: NORMAL
ISSUE DATE AND TIME: NORMAL
LYMPHOCYTES # BLD AUTO: 1.2 10E3/UL (ref 0.8–5.3)
LYMPHOCYTES NFR BLD AUTO: 23 %
MCH RBC QN AUTO: 30.6 PG (ref 26.5–33)
MCHC RBC AUTO-ENTMCNC: 31.1 G/DL (ref 31.5–36.5)
MCV RBC AUTO: 99 FL (ref 78–100)
MONOCYTES # BLD AUTO: 0.6 10E3/UL (ref 0–1.3)
MONOCYTES NFR BLD AUTO: 12 %
NEUTROPHILS # BLD AUTO: 3.2 10E3/UL (ref 1.6–8.3)
NEUTROPHILS NFR BLD AUTO: 62 %
NRBC # BLD AUTO: 0 10E3/UL
NRBC BLD AUTO-RTO: 0 /100
O2/TOTAL GAS SETTING VFR VENT: 48 %
OXYHGB MFR BLDV: 80 % (ref 70–75)
P AXIS - MUSE: 41 DEGREES
P AXIS - MUSE: NORMAL DEGREES
PCO2 BLDV: 55 MM HG (ref 40–50)
PH BLDV: 7.33 [PH] (ref 7.32–7.43)
PLATELET # BLD AUTO: 143 10E3/UL (ref 150–450)
PO2 BLDV: 48 MM HG (ref 25–47)
POTASSIUM SERPL-SCNC: 4.7 MMOL/L (ref 3.4–5.3)
PR INTERVAL - MUSE: 166 MS
PR INTERVAL - MUSE: NORMAL MS
PROT SERPL-MCNC: 5.8 G/DL (ref 6.4–8.3)
QRS DURATION - MUSE: 94 MS
QRS DURATION - MUSE: 96 MS
QT - MUSE: 354 MS
QT - MUSE: 416 MS
QTC - MUSE: 400 MS
QTC - MUSE: 442 MS
R AXIS - MUSE: 63 DEGREES
R AXIS - MUSE: 74 DEGREES
RBC # BLD AUTO: 2.22 10E6/UL (ref 3.8–5.2)
SAO2 % BLDV: 82 % (ref 70–75)
SODIUM SERPL-SCNC: 138 MMOL/L (ref 135–145)
SPECIMEN EXP DATE BLD: NORMAL
SYSTOLIC BLOOD PRESSURE - MUSE: NORMAL MMHG
SYSTOLIC BLOOD PRESSURE - MUSE: NORMAL MMHG
T AXIS - MUSE: -9 DEGREES
T AXIS - MUSE: 16 DEGREES
UNIT ABO/RH: NORMAL
UNIT NUMBER: NORMAL
UNIT STATUS: NORMAL
UNIT TYPE ISBT: 600
VENTRICULAR RATE- MUSE: 68 BPM
VENTRICULAR RATE- MUSE: 77 BPM
WBC # BLD AUTO: 5.2 10E3/UL (ref 4–11)

## 2025-03-10 PROCEDURE — 999N000157 HC STATISTIC RCP TIME EA 10 MIN

## 2025-03-10 PROCEDURE — 36415 COLL VENOUS BLD VENIPUNCTURE: CPT | Performed by: NURSE PRACTITIONER

## 2025-03-10 PROCEDURE — 90937 HEMODIALYSIS REPEATED EVAL: CPT

## 2025-03-10 PROCEDURE — 99233 SBSQ HOSP IP/OBS HIGH 50: CPT | Performed by: HOSPITALIST

## 2025-03-10 PROCEDURE — 36415 COLL VENOUS BLD VENIPUNCTURE: CPT | Performed by: HOSPITALIST

## 2025-03-10 PROCEDURE — 82805 BLOOD GASES W/O2 SATURATION: CPT | Performed by: HOSPITALIST

## 2025-03-10 PROCEDURE — 85004 AUTOMATED DIFF WBC COUNT: CPT | Performed by: NURSE PRACTITIONER

## 2025-03-10 PROCEDURE — 86900 BLOOD TYPING SEROLOGIC ABO: CPT | Performed by: HOSPITALIST

## 2025-03-10 PROCEDURE — 86850 RBC ANTIBODY SCREEN: CPT | Performed by: HOSPITALIST

## 2025-03-10 PROCEDURE — G0463 HOSPITAL OUTPT CLINIC VISIT: HCPCS

## 2025-03-10 PROCEDURE — 85018 HEMOGLOBIN: CPT | Performed by: NURSE PRACTITIONER

## 2025-03-10 PROCEDURE — 82040 ASSAY OF SERUM ALBUMIN: CPT | Performed by: NURSE PRACTITIONER

## 2025-03-10 PROCEDURE — 86923 COMPATIBILITY TEST ELECTRIC: CPT | Performed by: HOSPITALIST

## 2025-03-10 PROCEDURE — 94660 CPAP INITIATION&MGMT: CPT

## 2025-03-10 PROCEDURE — P9016 RBC LEUKOCYTES REDUCED: HCPCS | Performed by: HOSPITALIST

## 2025-03-10 PROCEDURE — 120N000013 HC R&B IMCU

## 2025-03-10 PROCEDURE — 250N000013 HC RX MED GY IP 250 OP 250 PS 637: Performed by: NURSE PRACTITIONER

## 2025-03-10 PROCEDURE — 99232 SBSQ HOSP IP/OBS MODERATE 35: CPT | Performed by: INTERNAL MEDICINE

## 2025-03-10 PROCEDURE — 82947 ASSAY GLUCOSE BLOOD QUANT: CPT | Performed by: NURSE PRACTITIONER

## 2025-03-10 PROCEDURE — 258N000003 HC RX IP 258 OP 636: Performed by: INTERNAL MEDICINE

## 2025-03-10 PROCEDURE — 85018 HEMOGLOBIN: CPT | Performed by: HOSPITALIST

## 2025-03-10 RX ORDER — NICOTINE POLACRILEX 4 MG
15-30 LOZENGE BUCCAL
Status: DISCONTINUED | OUTPATIENT
Start: 2025-03-10 | End: 2025-03-10

## 2025-03-10 RX ORDER — DEXTROSE MONOHYDRATE 25 G/50ML
25-50 INJECTION, SOLUTION INTRAVENOUS
Status: DISCONTINUED | OUTPATIENT
Start: 2025-03-10 | End: 2025-03-10

## 2025-03-10 RX ADMIN — APIXABAN 5 MG: 5 TABLET, FILM COATED ORAL at 21:40

## 2025-03-10 RX ADMIN — HYDRALAZINE HYDROCHLORIDE 75 MG: 50 TABLET ORAL at 21:40

## 2025-03-10 RX ADMIN — CETIRIZINE HYDROCHLORIDE 10 MG: 10 TABLET, FILM COATED ORAL at 21:40

## 2025-03-10 RX ADMIN — MIDODRINE HYDROCHLORIDE 5 MG: 5 TABLET ORAL at 09:46

## 2025-03-10 RX ADMIN — SODIUM CHLORIDE 250 ML: 0.9 INJECTION, SOLUTION INTRAVENOUS at 12:24

## 2025-03-10 RX ADMIN — ATORVASTATIN CALCIUM 20 MG: 20 TABLET, FILM COATED ORAL at 21:40

## 2025-03-10 RX ADMIN — Medication 40 MG: at 09:52

## 2025-03-10 RX ADMIN — CALCITRIOL CAPSULES 0.25 MCG 0.5 MCG: 0.25 CAPSULE ORAL at 09:52

## 2025-03-10 RX ADMIN — APIXABAN 5 MG: 5 TABLET, FILM COATED ORAL at 09:46

## 2025-03-10 RX ADMIN — SODIUM CHLORIDE 500 ML: 0.9 INJECTION, SOLUTION INTRAVENOUS at 12:23

## 2025-03-10 RX ADMIN — SERTRALINE HYDROCHLORIDE 25 MG: 25 TABLET ORAL at 09:46

## 2025-03-10 RX ADMIN — AMIODARONE HYDROCHLORIDE 200 MG: 200 TABLET ORAL at 21:40

## 2025-03-10 RX ADMIN — SODIUM CHLORIDE 200 ML: 0.9 INJECTION, SOLUTION INTRAVENOUS at 12:23

## 2025-03-10 RX ADMIN — AMIODARONE HYDROCHLORIDE 200 MG: 200 TABLET ORAL at 09:46

## 2025-03-10 RX ADMIN — SERTRALINE HYDROCHLORIDE 50 MG: 50 TABLET ORAL at 09:46

## 2025-03-10 RX ADMIN — Medication: at 12:23

## 2025-03-10 ASSESSMENT — ACTIVITIES OF DAILY LIVING (ADL)
ADLS_ACUITY_SCORE: 80
ADLS_ACUITY_SCORE: 80
ADLS_ACUITY_SCORE: 78
ADLS_ACUITY_SCORE: 80
ADLS_ACUITY_SCORE: 76
ADLS_ACUITY_SCORE: 80
ADLS_ACUITY_SCORE: 78
ADLS_ACUITY_SCORE: 80
ADLS_ACUITY_SCORE: 76
ADLS_ACUITY_SCORE: 80

## 2025-03-10 NOTE — PROGRESS NOTES
Renal Medicine Progress Note            Assessment/Plan:     # ESRD   -MWF   -LAVF   -[facility]    # HTN: BP is at target.   # IDDM  # Anemia  # Normal LV/RV function    Plan:  # Next HD treatment is on Wed Interval History:      Afebrile.  VSS.  Had dialysis today with net 3.5 hrs UF.   Hgb is low at 6.8.  She had blood transfused during HD          Medications and Allergies:     Current Facility-Administered Medications   Medication Dose Route Frequency Provider Last Rate Last Admin    - MEDICATION INSTRUCTIONS for Dialysis Patients -   Does not apply See Admin Instructions Cyril Ward MD        amiodarone (PACERONE) tablet 200 mg  200 mg Oral or Feeding Tube BID Celine aDmon APRN CNP   200 mg at 03/10/25 0946    amLODIPine (NORVASC) tablet 10 mg  10 mg Oral or Feeding Tube Once per day on Sunday Tuesday Thursday Saturday Celine Damon APRN CNP   10 mg at 03/09/25 2033    apixaban ANTICOAGULANT (ELIQUIS) tablet 5 mg  5 mg Oral or Feeding Tube BID Celine Damon APRN CNP   5 mg at 03/10/25 0946    atorvastatin (LIPITOR) tablet 20 mg  20 mg Oral or Feeding Tube QPM Celine Damon APRN CNP   20 mg at 03/09/25 2033    calcitRIOL (ROCALTROL) capsule 0.5 mcg  0.5 mcg Oral or Feeding Tube Once per day on Monday Wednesday Friday Celine Damon APRN CNP   0.5 mcg at 03/10/25 0952    cetirizine (zyrTEC) tablet 10 mg  10 mg Oral or Feeding Tube At Bedtime Celine Damon APRN CNP   10 mg at 03/09/25 2209    furosemide (LASIX) tablet 80 mg  80 mg Oral or Feeding Tube Daily Celine Damon APRN CNP   80 mg at 03/09/25 2033    hydrALAZINE (APRESOLINE) tablet 75 mg  75 mg Oral Q8H Celine Damon APRN CNP   75 mg at 03/09/25 2209    insulin aspart (NovoLOG) injection (RAPID ACTING)  1-7 Units Subcutaneous TID AC Celine Damon APRN CNP        insulin aspart (NovoLOG) injection (RAPID ACTING)  1-5 Units Subcutaneous At Bedtime Celine Damon APRN CNP         "insulin glargine (LANTUS PEN) injection 15 Units  15 Units Subcutaneous BID Celine Damon APRN CNP   15 Units at 03/09/25 2218    midodrine (PROAMATINE) tablet 5 mg  5 mg Oral or Feeding Tube Once per day on Monday Wednesday Friday Celine Damon APRN CNP   5 mg at 03/10/25 0946    pantoprazole (PROTONIX) 2 mg/mL suspension 40 mg  40 mg Per Feeding Tube QAM  Celine Damon APRN CNP   40 mg at 03/10/25 0952    sertraline (ZOLOFT) tablet 25 mg  25 mg Oral Daily Celine Damon APRN CNP   25 mg at 03/10/25 0946    sertraline (ZOLOFT) tablet 50 mg  50 mg Oral Daily Celine Damon APRN CNP   50 mg at 03/10/25 0946    sodium chloride (PF) 0.9% PF flush 3 mL  3 mL Intracatheter Q8H Celine Damon APRN CNP   3 mL at 03/10/25 0946    sodium chloride (PF) 0.9% PF flush 3 mL  3 mL Intracatheter Q8H Kurt Escobar, NP   3 mL at 03/10/25 0947        Allergies   Allergen Reactions    Ampicillin-Sulbactam Sodium Rash     No evidence SJS, but very uncomfortable and precipitated multiple provider visits. Would not use penicillins again if other options available.     Penicillins Rash            Physical Exam:   Vitals were reviewed   , Blood pressure (!) 152/56, pulse 67, temperature 98  F (36.7  C), temperature source Oral, resp. rate (!) 31, height 1.651 m (5' 5\"), weight 102 kg (224 lb 14.4 oz), SpO2 99%, not currently breastfeeding.    Wt Readings from Last 3 Encounters:   03/09/25 102 kg (224 lb 14.4 oz)   03/06/25 101.8 kg (224 lb 6.9 oz)   02/26/25 92.5 kg (203 lb 14.8 oz)       Intake/Output Summary (Last 24 hours) at 3/10/2025 1606  Last data filed at 3/10/2025 1530  Gross per 24 hour   Intake 60 ml   Output 3500 ml   Net -3440 ml       GENERAL APPEARANCE: NAD  HEENT:  Eyes/ears/nose/neck grossly normal  RESP: lungs cta b c good efforts, no crackles, rhonchi or wheezes  CV: RRR  ABDOMEN: o/s/nt/nd, bs present  EXTREMITIES/SKIN:No edema. L AKA  NEURO: Awake, alert and answering " questions    L upper AVF + bruit         Data:     CBC RESULTS:     Recent Labs   Lab 03/10/25  0642 03/09/25  1050 03/06/25  0633   WBC 5.2 5.6 5.2   RBC 2.22* 2.31* 2.53*   HGB 6.8* 7.0* 7.7*   HCT 21.9* 23.1* 25.4*   * 158 169       Basic Metabolic Panel:  Recent Labs   Lab 03/10/25  1322 03/10/25  0934 03/10/25  0642 03/10/25  0024 03/09/25  2126 03/09/25  1759 03/09/25  1050 03/05/25  1824 03/05/25  1356   NA  --   --  138  --   --   --  135  --   --    POTASSIUM  --   --  4.7  --   --   --  4.8  --  4.3   CHLORIDE  --   --  101  --   --   --  97*  --   --    CO2  --   --  26  --   --   --  27  --   --    BUN  --   --  56.6*  --   --   --  47.6*  --   --    CR  --   --  4.79*  --   --   --  3.78*  --   --    GLC 81 83 87 113* 143* 165* 343*   < >  --    JODY  --   --  9.9  --   --   --  9.9  --   --     < > = values in this interval not displayed.       INRNo lab results found in last 7 days.   Attestation:   I have reviewed today's relevant vital signs, notes, medications, labs and imaging.    Tian Lim MD  [facility] Nephrology  Office phone :[phone]  Pager: [phone]

## 2025-03-10 NOTE — PLAN OF CARE
Goal Outcome Evaluation:      Plan of Care Reviewed With: child          Outcome Evaluation: Goal is return to TCU

## 2025-03-10 NOTE — PROGRESS NOTES
Pt on NIV most of shift. Break given  at 0700 and then placed back on at 0800. BiPAP removed around 1600 after dialysis. Pt is tolerating oxymask well. RT will continue to follow.     Chandler Rivera, RT  3/10/2025

## 2025-03-10 NOTE — PLAN OF CARE
Goal Outcome Evaluation:  [id number]       Arrived on the floor at 0009 from [facility] . Had an RRT episode due to hypoxia.  Pt stable on BiPAP with 50% FiO2 10 Lpm.  A/O X 4, drowsy, able to open eyes when asked. Daughter was at bedside during transfer.  AVSS on BiPAP except for episodes of HTN. Scheduled Hydralazine q8h for high BP.  A2 turn and repo, A2 with lift  L AKA  On NPO. Has PEG tube, clamped. PEG site WNL.  Incontinent, 1 BM prior to coming to the floor. Oliguric, dialysis pt. (M,W,F)  AV fistula on L arm positive for bruit and thrill.  2 PIVs on R arm X SL  Skin: redness on under both breasts, redness on groin area, stage 2-3 PI on coccyx - mepilex for protection  Tele is SR  LS coarse, crackles  For Dialysis today. Nephrology following.  For Nutrition consult regarding PEG/TF management.  For WOC consult

## 2025-03-10 NOTE — CONSULTS
SPIRITUAL HEALTH SERVICES  SPIRITUAL ASSESSMENT Consult Note  [facility]     REFERRAL SOURCE: Consult for support    Introduced spiritual health services/role to Supriya. She shared that she is tired and waiting to learn about timing of dialysis. She expressed preference for a visit tomorrow, but welcomed a prayer which was provided.    PLAN: Spoke with bedside nurse. Triaged for follow up tomorrow per patient request. Please re-consult if more urgent needs arise.    Cindy Pak  Associate      [facility] available 24/7 for emergent requests/referrals, either by paging the on-call  or by entering an ASAP/STAT consult in Epic (this will also page the on-call ).

## 2025-03-10 NOTE — PROGRESS NOTES
[facility]  Hospitalist Progress Note   03/10/2025          Assessment and Plan:       Supriya Herr is a 75 year old female with history of, anemia and CKD, anxiety, depression, former tobacco use, hypertension, dyslipidemia, obesity, JONE, s/p traumatic amputation of the leg, DM2, history of noncompliance with medical treatment, and ESRD on dialysis Monday/Wednesday/Friday admitted on 3/9/2025 for shortness of breath.     Of note, the patient was hospitalized at [facility] between 3/2/2025 - 3/6/2025 due to concerns of shortness of breath.  During this hospitalization, she was intermittently hypoxic but most of the time had oxygen saturations above 90 on room air.  Volume status was difficult to determine, thus a right heart cath was pursued showing RA pressures of 9, wedge was 20.  Patient was treated for acute respiratory failure 2/2 pneumonia, heart failure and A-fib, ultimately discharged to a transitional care unit.  The patient was also hospitalized in January 2025, requiring mechanical ventilation for respiratory failure secondary to influenza A.     Acute hypoxic respiratory failure 2/2, suspect multifactorial etiology in the setting of ESRD on HD (MWF), pulmonary hypertension, HFpEF, chronic longstanding nocturnal hypoxia on home oxygen; pleural effusions  Typically able to tolerate room air during the day, but requiring up to 3 L during the night.  On 3/9, the patient reported shortness of breath at the TCU staff, felt nauseated and ultimately was found to be hypoxic in the low 80s requiring up to 3 L to sat greater than 92%.  Patient has a longstanding history of feeling dyspneic despite normal oxygen saturation, before/after hemodialysis.  Weights have been extremely variable based on documentation, EDW is predicted to be approximately 92.5 kg.  *RHC 3/5/25 showing moderately elevated right and left-sided filling pressure; elevated wedge pressure consistent with  pulmonary hypertension  *CXR 3/9/2025 showing bilateral interstitial and alveolar opacities, small pleural effusions, mild cardiomegaly, findings suggestive of pulmonary edema and volume overload.  --Patient admitted to IMC on intermittent BiPAP support.  This morning placed on BiPAP support -due to hypoxia.  Continue IMC status.  Continue intermittent BiPAP.  --Nephrology consulted to assist with dialysis, diuresis.  On oral Lasix unsure if effective].  Pulmonology consult- ?  Noninvasive mechanical ventilation.  - I/O's, daily weights, monitor urine output and serum Cr   - 1500 ml fluid restriction   - Cont therapy with binders      Goals of Care  Discussed with daughter and patient at bedside. This is the patient's 3rd hospitalization in 2 months, the daughter struggles to leave her at TCU due to how tenuous she is, chronically uncomfortable w/ dyspnea, and intermittently hypoxic.  Admitting hospitalist had discussed regarding tenuous clinical condition, goals of care.  No CPR, prearrest intubation okay.    Await further input on prognosis from nephrology, cardiology, pulmonology.     Elevated BNP  Chronically elevated troponin, suspected type II demand ischemia  baseline range 60-80s  Essential hypertension  History of bradycardia  Paroxysmal atrial fibrillation on chronic anticoagulation (Eliquis)   PTA Continue amiodarone, amlodipine,hydralazine   [id number]   *TTE demonstrating LVEF of 60% 01/2025  *Cardiology and EP consulted on prior hospital stay (01/2025), required IV amiodarone at that time and transition to oral therapy.    Continue PTA amiodarone.  Continue PTA apixaban.  At this time troponin 76 > 83.  proBNP 33, 144 (baseline 20s-40k).  Telemetry monitoring.  Cardiology consultation on medical management, diuresis/prognosis requested.    End-stage renal disease on HD MWF  Chronic kidney disease (CKD), stage V  History of left arm fistula  Of note - has not tolerated more aggressive fluid removal  in the past with associated hypotension and A-fib with RVR. Cinacalcet stopped on previous admission   -Consult nephrology as above    Acute on chronic anemia likely dilutional, no active blood loss.  Anemia of chronic disease, end-stage renal disease  Admission hemoglobin 7.0, baseline 7.0-8.0  -EPO per nephrology.  Consent for blood transfusion obtained.  Will transfuse 1 unit PRBC with dialysis.  Monitor hemoglobin levels later this evening or earlier if symptomatic.     S/p PEG tube  Gastroesophageal reflux disease  Vague dysphagia intermittent oral pharynx upper esophagus  Percutaneous endoscopic gastrostomy (PEG) tube placement prior hospital stay, with concerns of nutrition and swallowing per daughter.  *Esophagram 3/4-normal   - Nutrition service consulted to manage tube feedings   - Continue pantoprazole      Type II DM, controlled with hemoglobin A1c of 6.5 [likely low in the setting of anemia]  Diabetic neuropathy      Recent Labs   Lab Test 01/17/25 2039   A1C 6.5*     PTA regimen includes: PTA lantus 15 units bid (but discharged on 3/6 on 10 units BID)  Continue on 10 U lantus bid  - med intensity sliding scale insulin ordered.  - Glucose checks QID.    - Hypoglycemic protocol in place.       History of anxiety and depression   Concern anxiety was a contributing component to her dyspnea on previous admission..  Low-dose lorazepam as needed was stopped.  - continue PTA regimen     Hyperlipidemia  Continue atorvastatin     Obstructive sleep apnea (JONE)  Atelectasis bibasilar  Mild occasional nocturnal hypoxia  Sense of sob at night  Occasionally dips oxygen levels at night to 85-90% will discharge on 2L nocturnal oxygen to be weaned off at TCU but unable.      Weakness and deconditioning  S/p left above-knee amputation, traumatic  - PT, OT consulted     Coccyx pressure Injury Stage: either Stage 2 or 3   Reported on previous admission, follows w/ WOC   - WOC RN to follow       Clinically Significant Risk  "Factors           # Hypertension: Noted on problem list  # Chronic heart failure with preserved ejection fraction: heart failure noted on problem list and last echo with EF >50%    # Acute Hypercapnic Respiratory Failure: based on venous blood gas results.  Continue supplemental oxygen and ventilatory support as indicated.        # DMII: A1C = 6.5 % (Ref range: <5.7 %) within past 6 months   # Obesity: Estimated body mass index is 37.43 kg/m  as calculated from the following:    Height as of this encounter: 1.651 m (5' 5\").    Weight as of this encounter: 102 kg (224 lb 14.4 oz)., PRESENT ON ADMISSION     # Financial/Environmental Concerns: none          Orders Placed This Encounter      Minced & Moist Diet (level 5) Thin Liquids (level 0)      DVT Prophylaxis: SCDs, on DOAC.  Code Status: No CPR- Pre-arrest intubation OK  Disposition: Expected discharge in greater than 2 days pending clinical improvement.    Medically Ready for Discharge: Anticipated in 2-4 Days     Discussed with patient, bedside RN, care coordinator.  >51 minutes spent by me on the date of service doing chart review, history, exam, documentation & further activities per the note.      Cyril Ward MD        Interval History:        Patient lying in bed.  Awake, arousable and able to answer all questions.  Denies any chest pain.  Complaining of shortness of breath.  On BiPAP support.  No nausea or vomiting.  Has been n.p.o. while on BiPAP.  PEG tube clamped.  Denies any blood in the stool or blood in the urine or coughing or blood.  No abdominal pain.  Noted hemoglobin dropped to 6.8.  Per nursing report up with assist of 2, lift.  Telemetry sinus rhythm.         Physical Exam:        Physical Exam   Temp:  [97.5  F (36.4  C)-99  F (37.2  C)] 98  F (36.7  C)  Pulse:  [59-89] 70  Resp:  [25-48] 28  BP: (122-170)/(37-84) 139/57  FiO2 (%):  [30 %-50 %] 30 %  SpO2:  [94 %-100 %] 97 %    Intake/Output Summary (Last 24 hours) at 3/10/2025 " 1805  Last data filed at 3/10/2025 1530  Gross per 24 hour   Intake 410 ml   Output 3500 ml   Net -3090 ml       Admission Weight: 99.8 kg (220 lb)  Current Weight: 102 kg (224 lb 14.4 oz)    PHYSICAL EXAM  GENERAL: Patient is in no distress. Alert and oriented.  Frail-appearing.  HEENT: On BiPAP.  HEART: Regular rate and rhythm. S1S2.  Systolic murmur present.  LUNGS: Bilateral coarse breath sounds, no wheezing.  Respirations unlabored  ABDOMEN: Soft, no abdominal tenderness, bowel sounds heard.  PEG tube present.  NEURO: Moving all extremities.  EXTREMITIES: Pedal edema present.  SKIN: Warm, dry.  PSYCHIATRY Cooperative       Medications:        Current Facility-Administered Medications   Medication Dose Route Frequency Provider Last Rate Last Admin    - MEDICATION INSTRUCTIONS for Dialysis Patients -   Does not apply See Admin Instructions Cyril Ward MD        amiodarone (PACERONE) tablet 200 mg  200 mg Oral or Feeding Tube BID Celine Damon APRN CNP   200 mg at 03/10/25 0946    amLODIPine (NORVASC) tablet 10 mg  10 mg Oral or Feeding Tube Once per day on Sunday Tuesday Thursday Saturday Celine Damon APRN CNP   10 mg at 03/09/25 2033    apixaban ANTICOAGULANT (ELIQUIS) tablet 5 mg  5 mg Oral or Feeding Tube BID Celine Damon APRN CNP   5 mg at 03/10/25 0946    atorvastatin (LIPITOR) tablet 20 mg  20 mg Oral or Feeding Tube QPM Celine Damon APRN CNP   20 mg at 03/09/25 2033    calcitRIOL (ROCALTROL) capsule 0.5 mcg  0.5 mcg Oral or Feeding Tube Once per day on Monday Wednesday Friday Celine Damon APRN CNP   0.5 mcg at 03/10/25 0952    cetirizine (zyrTEC) tablet 10 mg  10 mg Oral or Feeding Tube At Bedtime Celine Damon APRN CNP   10 mg at 03/09/25 2209    furosemide (LASIX) tablet 80 mg  80 mg Oral or Feeding Tube Daily Celine Damon APRN CNP   80 mg at 03/09/25 2033    hydrALAZINE (APRESOLINE) tablet 75 mg  75 mg Oral Q8H Celine Damon APRN CNP   75 mg at  03/09/25 2209    insulin aspart (NovoLOG) injection (RAPID ACTING)  1-7 Units Subcutaneous TID  Celine Damon APRN CNP        insulin aspart (NovoLOG) injection (RAPID ACTING)  1-5 Units Subcutaneous At Bedtime Celine Damon APRN CNP        insulin glargine (LANTUS PEN) injection 15 Units  15 Units Subcutaneous BID Celine Damon APRN CNP   15 Units at 03/09/25 2218    midodrine (PROAMATINE) tablet 5 mg  5 mg Oral or Feeding Tube Once per day on Monday Wednesday Friday Celine Damon APRN CNP   5 mg at 03/10/25 0946    pantoprazole (PROTONIX) 2 mg/mL suspension 40 mg  40 mg Per Feeding Tube QAM  Celine Damon APRN CNP   40 mg at 03/10/25 0952    sertraline (ZOLOFT) tablet 25 mg  25 mg Oral Daily Celine Damon APRN CNP   25 mg at 03/10/25 0946    sertraline (ZOLOFT) tablet 50 mg  50 mg Oral Daily Celine Damon APRN CNP   50 mg at 03/10/25 0946    sodium chloride (PF) 0.9% PF flush 3 mL  3 mL Intracatheter Q8H Celine Damon APRN CNP   3 mL at 03/10/25 0946    sodium chloride (PF) 0.9% PF flush 3 mL  3 mL Intracatheter Q8H Kurt Escobar NP   3 mL at 03/10/25 0947     Current Facility-Administered Medications   Medication Dose Route Frequency Provider Last Rate Last Admin    acetaminophen (TYLENOL) tablet 650 mg  650 mg Oral Q4H PRN Celine Damon APRN CNP        albuterol (PROVENTIL HFA/VENTOLIN HFA) inhaler  2 puff Inhalation Q6H PRN Celine Damon APRN CNP        calcium carbonate (TUMS) chewable tablet 1,000 mg  1,000 mg Oral 4x Daily PRN Celine Damon APRN CNP        carboxymethylcellulose PF (REFRESH PLUS) 0.5 % ophthalmic solution 1 drop  1 drop Both Eyes Q1H PRN Luz, Kurt Luis A, NP        glucose gel 15-30 g  15-30 g Oral Q15 Min PRN Cyril Ward MD        Or    dextrose 50 % injection 25-50 mL  25-50 mL Intravenous Q15 Min PRN Cyril Ward MD        Or    glucagon injection 1 mg  1 mg Subcutaneous Q15 Min  PRN Cyril Ward MD        ipratropium - albuterol 0.5 mg/2.5 mg/3 mL (DUONEB) neb solution 3 mL  3 mL Nebulization Q4H PRN Celine Damon APRN CNP        lidocaine (LMX4) cream   Topical Q1H PRN Kurt Escobar NP        lidocaine 1 % 0.1-1 mL  0.1-1 mL Other Q1H PRN Kurt Escobar NP        No lozenges or gum should be given while patient on BIPAP/AVAPS/AVAPS AE   Does not apply Continuous PRN Kurt Escobar NP        olopatadine (PATANOL) 0.1 % ophthalmic solution 1 drop  1 drop Both Eyes BID PRN Celine Damon APRN CNP        ondansetron (ZOFRAN ODT) ODT tab 4 mg  4 mg Oral Q6H PRN Celine Damon APRN CNP        Or    ondansetron (ZOFRAN) injection 4 mg  4 mg Intravenous Q6H PRN Celine Damon APRN CNP        Patient is already receiving anticoagulation with heparin, enoxaparin (LOVENOX), warfarin (COUMADIN)  or other anticoagulant medication   Does not apply Continuous PRN Celine Damon APRN CNP        Patient may continue current oral medications   Does not apply Continuous PRN Kurt Escboar NP        senna-docusate (SENOKOT-S/PERICOLACE) 8.6-50 MG per tablet 1 tablet  1 tablet Oral BID PRN Celine Damon APRN CNP        Or    senna-docusate (SENOKOT-S/PERICOLACE) 8.6-50 MG per tablet 2 tablet  2 tablet Oral BID PRN Celine Damon APRN CNP        sevelamer carbonate (RENVELA) tablet 800 mg  800 mg Oral BID PRN Celine Damon APRN CNP        sodium chloride (PF) 0.9% PF flush 3 mL  3 mL Intracatheter q1 min prn Celine Damon APRN CNP        sodium chloride (PF) 0.9% PF flush 3 mL  3 mL Intracatheter q1 min prn Kurt Escobar NP                Data:      All new lab and imaging data was reviewed.

## 2025-03-10 NOTE — PLAN OF CARE
.Admission    Patient arrived to room 609 via cart from ED.  Care plan note: Done    Inpatient nursing criteria listed below were met:    Did you put disposition on whiteboard and in sticky note: Yes  Full skin assessment done (add LDA if skin issue present). Initials of 2nd RN :Yes NR  Isolation education started/completed NA  Patient allergies verified with patient: No  Fall Risk? (Care plan updated, Education given and documented) Yes  Primary Care Plan initiated: Yes  Home medications documented in belongings flowsheet: NA  Patient belongings documented in belongings flowsheet: YES  Reminder note (belongings/ medications) placed in discharge instructions:NA  Admission profile/ required documentation complete: Yes  If patient is a 72 hour hold/Commitment are belongings removed from room and locked up? NA           Summary:  Came from rehab center with SOB, nausea and elevated Bp. Found to have Acute hypoxic respiratory failure, pulmonary edema  DATE & TIME: 2137-5065    Cognitive Concerns/ Orientation : Alert and oriented x2-4   BEHAVIOR & AGGRESSION TOOL COLOR: Green  ABNL VS/O2: Patient on Bipap , Elevated BP- on scheduled hydralazine  MOBILITY: Assist x2 with lift,T&R, Total care, not out of bed this shift  PAIN MANAGMENT: Denies   DIET: NPO except for meds due to BIPAP, peg tube clapped  BOWEL/BLADDER: Incontinent of Bowel and bladder. BM x1 this shift  ABNL LAB/BG: BNP 23317, [address]  DRAIN/DEVICES: PIV SL, PEG tube clamped  TELEMETRY RHYTHM: Accelerated junctional rhythm  SKIN: PI to Buttock coccyx area, scattered bruises , L AV fistula Positive bruit and thrill  TESTS/PROCEDURES: EKG, ECG, CXR  D/C DAY/GOALS/PLACE: Pending work up  OTHER IMPORTANT INFO: Dialysis MWF, RRT called this evening due to worsening hypoxic respiratory failure - patient desating  with activity oxygen levels were between 80-85% on 15 Liters oxy mask. Patient now on BiPAP and will be transferring to [facility].Daughter at bedside

## 2025-03-10 NOTE — CONSULTS
Care Management Initial Consult    General Information  Assessment completed with: Children,    Type of CM/SW Visit: Initial Assessment    Primary Care Provider verified and updated as needed:     Readmission within the last 30 days: previous discharge plan unsuccessful      Reason for Consult: facility placement, discharge planning  Advance Care Planning: Advance Care Planning Reviewed: present on chart          Communication Assessment  Patient's communication style: spoken language (English or Bilingual)    Hearing Difficulty or Deaf: no   Wear Glasses or Blind: yes    Cognitive  Cognitive/Neuro/Behavioral: WDL  Level of Consciousness: alert  Arousal Level: arouses to voice  Orientation: oriented x 4  Mood/Behavior: calm  Best Language: 0 - No aphasia  Speech: clear, spontaneous, logical    Living Environment:   People in home: facility resident     Current living Arrangements: extended care facility  [facility] of Facility: [facility]   Able to return to prior arrangements: yes       Family/Social Support:  Care provided by: child(dominick)  Provides care for: no one, unable/limited ability to care for self  Marital Status:   Support system: Children          Description of Support System: Supportive, Involved    Support Assessment: Adequate family and caregiver support    Current Resources:   Patient receiving home care services: No        Community Resources: Skilled Nursing Facility, Dialysis Services  Equipment currently used at home: wheelchair, manual  Supplies currently used at home:      Employment/Financial:  Employment Status: retired        Financial Concerns: none   Referral to Financial Worker: No       Does the patient's insurance plan have a 3 day qualifying hospital stay waiver?  No    Lifestyle & Psychosocial Needs:  Social Drivers of Health     Food Insecurity: Low Risk  (3/9/2025)    Food Insecurity     Within the past 12 months, did you worry that your food would run out before you got money to  buy more?: No     Within the past 12 months, did the food you bought just not last and you didn t have money to get more?: No   Depression: Not at risk (9/11/2024)    PHQ-2     PHQ-2 Score: 0   Housing Stability: Low Risk  (3/9/2025)    Housing Stability     Do you have housing? : Yes     Are you worried about losing your housing?: No   Tobacco Use: Medium Risk (9/11/2024)    Patient History     Smoking Tobacco Use: Former     Smokeless Tobacco Use: Never     Passive Exposure: Not on file   Financial Resource Strain: Low Risk  (3/9/2025)    Financial Resource Strain     Within the past 12 months, have you or your family members you live with been unable to get utilities (heat, electricity) when it was really needed?: No   Alcohol Use: Not on file   Transportation Needs: Low Risk  (3/9/2025)    Transportation Needs     Within the past 12 months, has lack of transportation kept you from medical appointments, getting your medicines, non-medical meetings or appointments, work, or from getting things that you need?: No   Physical Activity: Insufficiently Active (7/19/2024)    Exercise Vital Sign     Days of Exercise per Week: 1 day     Minutes of Exercise per Session: 10 min   Interpersonal Safety: Low Risk  (3/9/2025)    Interpersonal Safety     Do you feel physically and emotionally safe where you currently live?: Yes     Within the past 12 months, have you been hit, slapped, kicked or otherwise physically hurt by someone?: No     Within the past 12 months, have you been humiliated or emotionally abused in other ways by your partner or ex-partner?: No   Stress: No Stress Concern Present (7/19/2024)    [employer] [place] [employer] - Occupational Stress Questionnaire     Feeling of Stress : Only a little   Social Connections: Unknown (7/19/2024)    Social Connection and Isolation Panel [NHANES]     Frequency of Communication with Friends and Family: Not on file     Frequency of Social Gatherings with Friends  and Family: More than three times a week     Attends Muslim Services: Not on file     Active Member of Clubs or Organizations: Not on file     Attends Club or Organization Meetings: Not on file     Marital Status: Not on file   Health Literacy: Not on file       Functional Status:  Prior to admission patient needed assistance:   Dependent ADLs:: Wheelchair-with assist          Mental Health Status:  Mental Health Status: No Current Concerns       Chemical Dependency Status:  Chemical Dependency Status: No Current Concerns             Values/Beliefs:  Spiritual, Cultural Beliefs, Muslim Practices, Values that affect care: no               Discussed  Partnership in Safe Discharge Planning  document with patient/family: No    Additional Information:  SW/CM consult for discharge planning, elevated risk score and ambulatory care coordinator hand-in. SW reviewed chart. Pt discharged from [facility] back to [facility] on 3/6/25. Pt has been residing at [facility] since 2/26/25. SW sent a new referral and pt has a bed hold. HIGINIO called and spoke with pt's daughter, Caroline Gray. Caroline Gray is in agreement with pt returning to [facility], but ultimately does want her home. At baseline, pt lives at home with Caroline Gray and her family. Caroline Gray believes she can care for her, but would need equipment, such as a mechanical lift. Caroline Gray understands how often home care staff may come as compared to staff in a TCU. Currently goal is to return to TCU. HIGINIO provided supportive listening to Caroline Gray. Walter, , from [facility] here to visit pt. He wonders if pt needs equipment higher then a CPap and would like pt to have a pulmonology consult. RN CC will notify MD.     Next Steps: Coordinate return to TCU. HIGINIO following for discharge planning.     KAITLYNN Mullins, [facility]  [phone] Desk phone  [phone] Cell/text (Preferred)  [facility]

## 2025-03-10 NOTE — PROGRESS NOTES
DIALYSIS PROCEDURE NOTE      Patient dialyzed for 3.5 hrs. on a K2 bath with a net fluid removal of  3.5L.  A BFR of 450 ml/min was obtained via a AVF using 15 gauge needles.      The treatment plan was discussed with Dr. Madrid during the treatment.    Total heparin received during the treatment: 0 units.   Needle cannulation sites held x 15 min.     Meds  given: Blood given during run   Complications: none      Person educated: patient. Barriers to learning: none. Educated on procedure via verbal mode. Patient verbalized understanding.     ICEBOAT    I: Patient was identified using 2 identifiers  C:  Consent Signed Yes  E: Equipment preventative maintenance is current and dialysis delivery system OK to use  B: Hepatitis B Surface result    Latest Reference Range & Units 03/03/25 09:37 03/05/25 13:56   Hep B Surface Agn Nonreactive  Nonreactive Nonreactive   Hepatitis B Core Liv Nonreactive  Nonreactive    Hepatitis B Surface Antibody Instrument Value <8.5 m[IU]/mL <3.50      O: Dialysis orders present and complete prior to treatment  A: Vascular access verified and assessed prior to treatment  T: Treatment was performed at a clinically appropriate time  ?: Patient was allowed to ask questions and address concerns prior to treatment  Machine water alarm in place and functioning. Transducer pods intact and checked every 15min.   Pt returned via BS.  Chlorine/Chloramine water system checked every 4 hours.  Outpatient Dialysis at [facility]    Patient repositioned every 2 hours during the treatment.  Post treatment report given     Please remove patient dressing on AVF and AVG needle sites 24 hours after dialysis. If leaking occurs please apply a Band-Aid.

## 2025-03-10 NOTE — PLAN OF CARE
A&O. VSS on 2L NC. Tele SR. Lung sounds coarse throughout. Cheikh RLE. AKA LLE. Lift/T&R. PIV's SL. Fistula to LUE. PEG tube clamped. Known PI to coccyx, WOC consulted. Incontinent of B&B, not voiding - hemodialysis. No BM this shift- BS active, passing gas. ACHS BG checks, on minced and moist diet. Speech consulted.    Plan: Ran dialysis today, 3.5L off and got 1 unit of blood. Hgb recheck was 8.3.

## 2025-03-10 NOTE — CONSULTS
[facility]  WO Nurse Inpatient Assessment     Consulted for: coccyx    Summary: Patient declined turning today, currently having approx 2 hours of HD, requested for WOC RN to return tomorrow related to some anxiety and feeling out of breath, BiPAP in use    WO nurse follow-up plan: Tuesday 3/11 and then weekly    Patient History (according to provider note(s):      75 year old female with a past medical history significant for CHF, anemia and CKD, anxiety, depression, former tobacco use, hypertension, dyslipidemia, obesity, JONE, s/p traumatic amputation of the leg, DM2, history of noncompliance with medical treatment, and ESRD on dialysis Monday/Wednesday/Friday  who presents to the ED for evaluation of acute on chronic dyspnea, nausea, and diaphoresis.     Of note, the patient was hospitalized at [facility] between 3/2/2025 - 3/6/2025 due to concerns of shortness of breath.  During this hospitalization, she was intermittently hypoxic but most of the time had oxygen saturations above 90 on room air.  Volume status was difficult to determine, thus a right heart cath was pursued showing RA pressures of 9, wedge was 20.  Patient was treated for acute respiratory failure 2/2 pneumonia, heart failure and A-fib, ultimately discharged to a transitional care unit.  The patient was also hospitalized in January 2025, requiring mechanical ventilation for respiratory failure secondary to influenza A.    Weakness and deconditioning  S/p left above-knee amputation, traumatic  - PT, OT consulted     Coccyx pressure Injury Stage: either Stage 2 or 3   Reported on previous admission, follows w/ WOC   - WOC RN to follow      Assessment:      Areas visualized during today's visit: Focused: and Coccyx, patient declined assessment. Chart reviewed, wound to coccyx and sacral area previously noted as pressure related st 2vs3, present on admission.   See history and media history. RN updated of  "patients declination of assessment, will return at a later time for assessment. Will enter PIP orders for continued prevention, WOC to adjust orders pending assessment      Treatment Plan:     Coccyx: Every 3 days and as needed  Cleanse the area with NS and pat dry.  Apply No sting film barrier to periwound skin.  Cover wound with Sacral Mepilex (#[id number])  Turn and reposition Q 2hrs side to side only.  Ensure pt has Koko-cushion while sitting up in the chair.  FYI- If pt has constant incontinent loose stools needing dressing changes Q shift please discontinue the Mepilex dressing and apply criticaid barrier paste BID and PRN.     Pressure Injury Prevention (PIP) Plan:  If patient is declining pressure injury prevention interventions: Explore reason why and address patient's concerns, Educate on pressure injury risk and prevention intervention(s), If patient is still declining, document \"informed refusal\" , and Ensure Care team is aware ( provider, charge nurse, etc)  Mattress: Follow bed algorithm, add Low Air Loss (Air+) mattress pump if skin is very moist or constantly moist.   HOB: Maintain at or below 30 degrees, unless contraindicated  Repositioning in bed: Every 1-2 hours , Left/right positioning; avoid supine, Raise foot of bed prior to raising head of bed, to reduce patient sliding down (shear), and Frequent microturns using positioning wedges, as patient tolerates  Heels: Keep elevated off mattress, Pillows under calves, and Heel lift boots  Protective Dressing: Sacral Mepilex for prevention (#303599),  especially for the agitated patient   Positioning Equipment:None  Chair positioning: Chair cushion (#[id number]) , Assist patient to reposition hourly, and Do NOT use a donut for sitting (this increases pressure to smaller area and creates a higher potential for injury)    If patient has a buttock pressure injury, or high risk for PI use chair cushion or SPS.  Moisture Management: Perineal cleansing /protection: " Follow Incontinence Protocol, Avoid brief in bed, Clean and dry skin folds with bathing , Consider InterDry (#014365) between folds, and Moisturize dry skin  Under Devices: Inspect skin under all medical devices during skin inspection , Ensure tubes are stabilized without tension, and Ensure patient is not lying on medical devices or equipment when repositioned  Ask provider to discontinue device when no longer needed.      Orders: Written    RECOMMEND PRIMARY TEAM ORDER: None, at this time  Education provided: importance of repositioning, plan of care, wound progress, Infection prevention , Moisture management, Hygiene, and Off-loading pressure  Discussed plan of care with: Patient and Nurse  Notify [facility] if wound(s) deteriorate.  Nursing to notify the Provider(s) and re-consult the WO Nurse if new skin concern.    DATA:     Current support surface: Standard  Standard gel mattress (Isoflex)  Containment of urine/stool: Incontinence Protocol and Incontinent pad in bed  BMI: Body mass index is 37.43 kg/m .   Active diet order: Orders Placed This Encounter      NPO for Medical/Clinical Reasons Except for: Meds     Output: No intake/output data recorded.     Labs:   Recent Labs   Lab 03/10/25  0642   ALBUMIN 3.1*   HGB 6.8*   WBC 5.2     Pressure injury risk assessment:   Sensory Perception: 3-->slightly limited  Moisture: 4-->rarely moist  Activity: 1-->bedfast  Mobility: 2-->very limited  Nutrition: 2-->probably inadequate  Friction and Shear: 2-->potential problem  Ben Score: 14    Pat Oro RN, BSN, CWOCN   Pager no longer is use, please contact through [facility] group: [facility] Nurse Eva  Dept. Office Number: [phone]

## 2025-03-11 ENCOUNTER — APPOINTMENT (OUTPATIENT)
Dept: SPEECH THERAPY | Facility: CLINIC | Age: 75
DRG: 280 | End: 2025-03-11
Attending: HOSPITALIST
Payer: COMMERCIAL

## 2025-03-11 LAB
ANION GAP SERPL CALCULATED.3IONS-SCNC: 11 MMOL/L (ref 7–15)
BASE EXCESS BLDV CALC-SCNC: 7.3 MMOL/L (ref -3–3)
BUN SERPL-MCNC: 26.2 MG/DL (ref 8–23)
CALCIUM SERPL-MCNC: 9.7 MG/DL (ref 8.8–10.4)
CHLORIDE SERPL-SCNC: 97 MMOL/L (ref 98–107)
CREAT SERPL-MCNC: 2.89 MG/DL (ref 0.51–0.95)
EGFRCR SERPLBLD CKD-EPI 2021: 16 ML/MIN/1.73M2
ERYTHROCYTE [DISTWIDTH] IN BLOOD BY AUTOMATED COUNT: 15.9 % (ref 10–15)
GLUCOSE BLDC GLUCOMTR-MCNC: 103 MG/DL (ref 70–99)
GLUCOSE BLDC GLUCOMTR-MCNC: 104 MG/DL (ref 70–99)
GLUCOSE BLDC GLUCOMTR-MCNC: 129 MG/DL (ref 70–99)
GLUCOSE BLDC GLUCOMTR-MCNC: 209 MG/DL (ref 70–99)
GLUCOSE BLDC GLUCOMTR-MCNC: 67 MG/DL (ref 70–99)
GLUCOSE BLDC GLUCOMTR-MCNC: 86 MG/DL (ref 70–99)
GLUCOSE SERPL-MCNC: 97 MG/DL (ref 70–99)
HCO3 BLDV-SCNC: 33 MMOL/L (ref 21–28)
HCO3 SERPL-SCNC: 29 MMOL/L (ref 22–29)
HCT VFR BLD AUTO: 27.4 % (ref 35–47)
HGB BLD-MCNC: 8.5 G/DL (ref 11.7–15.7)
MCH RBC QN AUTO: 30 PG (ref 26.5–33)
MCHC RBC AUTO-ENTMCNC: 31 G/DL (ref 31.5–36.5)
MCV RBC AUTO: 97 FL (ref 78–100)
O2/TOTAL GAS SETTING VFR VENT: 30 %
OXYHGB MFR BLDV: 85 % (ref 70–75)
PCO2 BLDV: 50 MM HG (ref 40–50)
PH BLDV: 7.42 [PH] (ref 7.32–7.43)
PLATELET # BLD AUTO: 170 10E3/UL (ref 150–450)
PO2 BLDV: 50 MM HG (ref 25–47)
POTASSIUM SERPL-SCNC: 4 MMOL/L (ref 3.4–5.3)
RBC # BLD AUTO: 2.83 10E6/UL (ref 3.8–5.2)
SAO2 % BLDV: 87.3 % (ref 70–75)
SODIUM SERPL-SCNC: 137 MMOL/L (ref 135–145)
WBC # BLD AUTO: 5.8 10E3/UL (ref 4–11)

## 2025-03-11 PROCEDURE — 99207 PR NO CHARGE LOS: CPT | Performed by: INTERNAL MEDICINE

## 2025-03-11 PROCEDURE — 82435 ASSAY OF BLOOD CHLORIDE: CPT | Performed by: HOSPITALIST

## 2025-03-11 PROCEDURE — 85014 HEMATOCRIT: CPT | Performed by: HOSPITALIST

## 2025-03-11 PROCEDURE — 250N000013 HC RX MED GY IP 250 OP 250 PS 637: Performed by: HOSPITALIST

## 2025-03-11 PROCEDURE — 120N000001 HC R&B MED SURG/OB

## 2025-03-11 PROCEDURE — 250N000012 HC RX MED GY IP 250 OP 636 PS 637: Performed by: NURSE PRACTITIONER

## 2025-03-11 PROCEDURE — 82805 BLOOD GASES W/O2 SATURATION: CPT | Performed by: HOSPITALIST

## 2025-03-11 PROCEDURE — 92610 EVALUATE SWALLOWING FUNCTION: CPT | Mod: GN | Performed by: SPEECH-LANGUAGE PATHOLOGIST

## 2025-03-11 PROCEDURE — 92526 ORAL FUNCTION THERAPY: CPT | Mod: GN | Performed by: SPEECH-LANGUAGE PATHOLOGIST

## 2025-03-11 PROCEDURE — 36415 COLL VENOUS BLD VENIPUNCTURE: CPT | Performed by: HOSPITALIST

## 2025-03-11 PROCEDURE — G0463 HOSPITAL OUTPT CLINIC VISIT: HCPCS

## 2025-03-11 PROCEDURE — 99223 1ST HOSP IP/OBS HIGH 75: CPT | Performed by: STUDENT IN AN ORGANIZED HEALTH CARE EDUCATION/TRAINING PROGRAM

## 2025-03-11 PROCEDURE — 99231 SBSQ HOSP IP/OBS SF/LOW 25: CPT | Performed by: INTERNAL MEDICINE

## 2025-03-11 PROCEDURE — 250N000013 HC RX MED GY IP 250 OP 250 PS 637: Performed by: NURSE PRACTITIONER

## 2025-03-11 PROCEDURE — 80048 BASIC METABOLIC PNL TOTAL CA: CPT | Performed by: HOSPITALIST

## 2025-03-11 PROCEDURE — 99233 SBSQ HOSP IP/OBS HIGH 50: CPT | Performed by: HOSPITALIST

## 2025-03-11 RX ORDER — B COMPLEX C NO.10/FOLIC ACID 900MCG/5ML
5 LIQUID (ML) ORAL DAILY
Status: DISCONTINUED | OUTPATIENT
Start: 2025-03-11 | End: 2025-03-17 | Stop reason: HOSPADM

## 2025-03-11 RX ORDER — DEXTROSE MONOHYDRATE 100 MG/ML
INJECTION, SOLUTION INTRAVENOUS CONTINUOUS PRN
Status: DISCONTINUED | OUTPATIENT
Start: 2025-03-11 | End: 2025-03-17 | Stop reason: HOSPADM

## 2025-03-11 RX ADMIN — AMIODARONE HYDROCHLORIDE 200 MG: 200 TABLET ORAL at 09:38

## 2025-03-11 RX ADMIN — ATORVASTATIN CALCIUM 20 MG: 20 TABLET, FILM COATED ORAL at 21:39

## 2025-03-11 RX ADMIN — APIXABAN 5 MG: 5 TABLET, FILM COATED ORAL at 09:38

## 2025-03-11 RX ADMIN — ACETAMINOPHEN 650 MG: 325 TABLET, FILM COATED ORAL at 09:37

## 2025-03-11 RX ADMIN — INSULIN ASPART 1 UNITS: 100 INJECTION, SOLUTION INTRAVENOUS; SUBCUTANEOUS at 22:36

## 2025-03-11 RX ADMIN — HYDRALAZINE HYDROCHLORIDE 75 MG: 50 TABLET ORAL at 21:39

## 2025-03-11 RX ADMIN — Medication 5 ML: at 14:26

## 2025-03-11 RX ADMIN — SERTRALINE HYDROCHLORIDE 25 MG: 25 TABLET ORAL at 09:38

## 2025-03-11 RX ADMIN — SERTRALINE HYDROCHLORIDE 50 MG: 50 TABLET ORAL at 09:38

## 2025-03-11 RX ADMIN — CETIRIZINE HYDROCHLORIDE 10 MG: 10 TABLET, FILM COATED ORAL at 21:39

## 2025-03-11 RX ADMIN — AMIODARONE HYDROCHLORIDE 200 MG: 200 TABLET ORAL at 21:39

## 2025-03-11 RX ADMIN — AMLODIPINE BESYLATE 10 MG: 10 TABLET ORAL at 09:38

## 2025-03-11 RX ADMIN — HYDRALAZINE HYDROCHLORIDE 75 MG: 50 TABLET ORAL at 14:26

## 2025-03-11 RX ADMIN — FUROSEMIDE 80 MG: 40 TABLET ORAL at 09:37

## 2025-03-11 RX ADMIN — CALCIUM CARBONATE (ANTACID) CHEW TAB 500 MG 1000 MG: 500 CHEW TAB at 09:37

## 2025-03-11 RX ADMIN — HYDRALAZINE HYDROCHLORIDE 75 MG: 50 TABLET ORAL at 05:10

## 2025-03-11 RX ADMIN — APIXABAN 5 MG: 5 TABLET, FILM COATED ORAL at 21:39

## 2025-03-11 ASSESSMENT — ACTIVITIES OF DAILY LIVING (ADL)
ADLS_ACUITY_SCORE: 72
ADLS_ACUITY_SCORE: 71
ADLS_ACUITY_SCORE: 76
ADLS_ACUITY_SCORE: 72
ADLS_ACUITY_SCORE: 71
ADLS_ACUITY_SCORE: 76
ADLS_ACUITY_SCORE: 71
ADLS_ACUITY_SCORE: 76
ADLS_ACUITY_SCORE: 72
ADLS_ACUITY_SCORE: 78
ADLS_ACUITY_SCORE: 71
ADLS_ACUITY_SCORE: 72
ADLS_ACUITY_SCORE: 71
ADLS_ACUITY_SCORE: 78
ADLS_ACUITY_SCORE: 76
ADLS_ACUITY_SCORE: 72
ADLS_ACUITY_SCORE: 71
ADLS_ACUITY_SCORE: 72
ADLS_ACUITY_SCORE: 78
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
## 2025-03-11 NOTE — CONSULTS
"CLINICAL NUTRITION SERVICES - ASSESSMENT NOTE      Malnutrition Status:    % Intake: No decreased intake noted - pt on EN + po intake  % Weight Loss: None noted  Subcutaneous Fat Loss: None observed  Muscle Loss: Wasting of the temples (temporalis muscle): Mild and Clavicles (pectoralis and deltoids): Mild  Fluid Accumulation/Edema: None noted  Malnutrition Diagnosis: Patient does not meet two of the established criteria necessary for diagnosing malnutrition  Malnutrition Present on Admission: No    Registered Dietitian Interventions:  Type of Feeding Tube: PEG  Enteral Frequency:  14 hr cycle  Enteral Regimen: Jevity 1.5 @ 65 mL/hr (4pm-6am)  Total Enteral Provisions: 1365 cals (21 cals/kg), 58 gm pro (0.9 gm/kg), 19 gm fiber, 692 mL free water, 1984 mg K, 1138 mg Phos  Free Water Flush: 60 mL before and after cycle    Ok to resume EN today, as pt was receiving PTA    Added Nephronex vitamin for wound healing    Encouraged po intake of meals for added cals/pro  Pt declines nutrition supplements    Will modify EN as needed, pending po intake         REASON FOR ASSESSMENT  Provider order - Registered Dietitian to order TF per Medical Nutrition Therapy Guidelines    SUBJECTIVE INFORMATION  Assessed patient in room.    NUTRITION HISTORY  Chart reviewed  PEG placed 2/17/25  Per TCU transfer sheets, pt has been on a \"CHO controlled, Regular, Thin Liquids, 1500 mL fluid restriction\" diet    EN via G-tube at TCU:  Jevity 1.5 @ 65 mL/hr x 14 hrs (4pm-6am) = 1365 cals, 58 gm pro, 19 gm fiber, 692 mL free water, 1984 mg K, 1138 mg Phos  FWF of 60 mL before and after cycle    Visited with pt this afternoon  She reports tolerating po  Prefers softer foods that are cut up into small pieces  Notes that sometimes she has a hard time swallowing liquids, but doesn't want them thickened  She dislikes supplements - \"I won't drink them\"  Reports tolerating her EN regimen at TCU      ** note that during recent admit earlier this month, " "pt was receiving Expedite modular for wound healing, as well as daily Nephronex      CURRENT NUTRITION ORDERS  Diet: Regular, Thin Liquids, 1500 mL fluid restriction  (per SLP)    CURRENT INTAKE/TOLERANCE  Pt reports eating a small amount of her breakfast  Plans to order a meal later this afternoon  Agreeable to resuming her prior EN regimen    LABS  Nutrition-relevant labs: Reviewed  3/11: Na 137           K 4.0    MEDICATIONS  Nutrition-relevant medications: Reviewed  - lasix  - insulin    ANTHROPOMETRICS  Height: 165.1 cm (5' 5\")  Most Recent Weight: (3/9)102 kg (224 lb 14.4 oz)  IBW: 51 kg (left AKA)  % IBW: 200%  BMI (kg/m ): Obesity Class II BMI 35-39.9  Weight History:   Wt Readings from Last 10 Encounters:   03/09/25 102 kg (224 lb 14.4 oz)   03/06/25 101.8 kg (224 lb 6.9 oz)   02/26/25 92.5 kg (203 lb 14.8 oz)   06/14/24 101.1 kg (222 lb 14.2 oz)   06/03/24 101.1 kg (222 lb 14.2 oz)   04/24/24 101.1 kg (222 lb 14.2 oz)   02/05/24 95.8 kg (211 lb 3.2 oz)   04/21/23 98 kg (216 lb)   01/31/23 94.3 kg (208 lb)   06/27/22 94.3 kg (208 lb)       Dosing Weight: 64 kg, based on adjusted wt    ASSESSED NUTRITION NEEDS  Estimated Energy Needs: 2928-9402 kcals/day (25 - 30 kcals/kg)  Justification: Overweight  Estimated Protein Needs: 75-95 grams protein/day (1.2 - 1.5 grams of pro/kg)  Justification: Dialysis and Wound healing  Estimated Fluid Needs: 8471-1684 mL/day (1 mL/kcal)  Justification: Maintenance    SYSTEM FINDINGS    ESRD with HD    Skin/wounds: Coccyx PI noted on admit  GI symptoms: + BM    MALNUTRITION  % Intake: No decreased intake noted - pt on EN + po intake  % Weight Loss: None noted  Subcutaneous Fat Loss: None observed  Muscle Loss: Wasting of the temples (temporalis muscle): Mild and Clavicles (pectoralis and deltoids): Mild  Fluid Accumulation/Edema: None noted  Malnutrition Diagnosis: Patient does not meet two of the established criteria necessary for diagnosing malnutrition  Malnutrition " Present on Admission: No    NUTRITION DIAGNOSIS  Inadequate energy intake related to EN has yet to resume as evidenced by pt not meeting nutrition needs with po intake alone     INTERVENTIONS  Type of Feeding Tube: PEG  Enteral Frequency:  14 hr cycle  Enteral Regimen: Jevity 1.5 @ 65 mL/hr (4pm-6am)  Total Enteral Provisions: 1365 cals (21 cals/kg), 58 gm pro (0.9 gm/kg), 19 gm fiber, 692 mL free water, 1984 mg K, 1138 mg Phos  Free Water Flush: 60 mL before and after cycle    Ok to resume EN today, as pt was receiving PTA    Added Nephronex vitamin for wound healing    Encouraged po intake of meals for added cals/pro  Pt declines nutrition supplements    Will modify EN as needed, pending po intake      Goals  EN + po intake to meet nutrition needs       Monitoring/Evaluation  Progress toward goals will be monitored and evaluated per policy.

## 2025-03-11 NOTE — PROGRESS NOTES
[facility]  Hospitalist Progress Note   03/11/2025          Assessment and Plan:       Supriya Herr is a 75 year old female with history of, anemia and CKD, anxiety, depression, former tobacco use, hypertension, dyslipidemia, obesity, JONE, s/p traumatic amputation of the leg, DM2, history of noncompliance with medical treatment, and ESRD on dialysis Monday/Wednesday/Friday admitted on 3/9/2025 for shortness of breath.     Of note, the patient was hospitalized at [facility] between 3/2/2025 - 3/6/2025 due to concerns of shortness of breath.  During this hospitalization, she was intermittently hypoxic but most of the time had oxygen saturations above 90 on room air.  Volume status was difficult to determine, thus a right heart cath was pursued showing RA pressures of 9, wedge was 20.  Patient was treated for acute respiratory failure 2/2 pneumonia, heart failure and A-fib, ultimately discharged to a transitional care unit.  The patient was also hospitalized in January 2025, requiring mechanical ventilation for respiratory failure secondary to influenza A.     Acute hypoxic respiratory failure 2/2, suspect multifactorial etiology in the setting of ESRD on HD (MWF), pulmonary hypertension, HFpEF, chronic longstanding nocturnal hypoxia on home oxygen; pleural effusions, JONE.  JONE not compliant with CPAP.  Typically able to tolerate room air during the day, but requiring up to 3 L during the night.  On 3/9, the patient reported shortness of breath at the TCU staff, felt nauseated and ultimately was found to be hypoxic in the low 80s requiring up to 3 L to sat greater than 92%.  Patient has a longstanding history of feeling dyspneic despite normal oxygen saturation, before/after hemodialysis.  Weights have been extremely variable based on documentation, EDW is predicted to be approximately 92.5 kg.  *RHC 3/5/25 showing moderately elevated right and left-sided filling pressure; elevated  wedge pressure consistent with pulmonary hypertension  *CXR 3/9/2025 showing bilateral interstitial and alveolar opacities, small pleural effusions, mild cardiomegaly, findings suggestive of pulmonary edema and volume overload.  --Patient admitted to [facility] on intermittent BiPAP support dialysis on 3/10  -weaned off to nasal oxygen this morning.  Continue supplemental nasal oxygen, wean as able to.  Encourage patient to use CPAP at nighttime.  Pulmonology consult requested -await input on prognosis/management to keep patient out of the hospital if able to.  Nephrology following, dialysis on 3/12.  I/O's, daily weights, monitor urine output and serum Cr   1500 ml fluid restriction   Cont therapy with binders      Goals of Care  Admitting hospitalist was discussed with patient, daughter.  Third hospitalization in 2 months.  No CPR.  Prearrest intubation okay.   Revisited goals of care discussion with patient on 3/11 while more awake.  Patient reports would like to continue restorative care and live as long as able to.  Can consider palliative evaluation as outpatient    Acute exacerbation of heart failure with preserved EF.  Chronically elevated troponin, suspected type II demand ischemia  baseline range 60-80s  Paroxysmal atrial fibrillation on chronic anticoagulation (Eliquis)   Essential hypertension  History of bradycardia  PTA Continue amiodarone, amlodipine,hydralazine   [id number]   *TTE demonstrating LVEF of 60% 01/2025  *Cardiology and EP consulted on prior hospital stay (01/2025), required IV amiodarone at that time and transition to oral therapy.    At this time troponin 76 > 83.  proBNP 33, 144 (baseline 20s-40k).  Continue PTA amiodarone.  Continue PTA apixaban.  Telemetry monitoring.  Cardiology chart reviewed, volume management through dialysis.    End-stage renal disease on HD MWF  Chronic kidney disease (CKD), stage V  History of left arm fistula  Of note - has not tolerated more aggressive fluid  removal in the past with associated hypotension and A-fib with RVR. Cinacalcet stopped on previous admission   Nephrology following, appreciate input    Status post blood transfusion  Acute on chronic anemia likely dilutional, no active blood loss.  Anemia of chronic disease, end-stage renal disease  Admission hemoglobin 7.0, baseline 7.0-8.0  -Hemoglobin dropped to 6.8 on 3/10, received 1 unit PRBC.  Now hemoglobin in 8 range.  -EPO per nephrology.  Monitor hemoglobin levels in a.m. or earlier if symptomatic     S/p PEG tube  Gastroesophageal reflux disease  Vague dysphagia intermittent oral pharynx upper esophagus  Percutaneous endoscopic gastrostomy (PEG) tube placement prior hospital stay, with concerns of nutrition and swallowing per daughter.  *Esophagram 3/4-normal   - Continue pantoprazole   Continue oral diet as recommended by speech therapy.  Continue tube feeds nocturnal, nutrition consulted.     Type II DM, controlled with hemoglobin A1c of 6.5 [likely low in the setting of anemia]  Diabetic neuropathy  PTA regimen includes: PTA lantus 15 units bid (but discharged on 3/6 on 10 units BID)  Continue on 10 U lantus bid  - med intensity sliding scale insulin ordered.  - Glucose checks QID.    - Hypoglycemic protocol in place.       History of anxiety and depression   Concern anxiety was a contributing component to her dyspnea on previous admission. Low-dose lorazepam as needed was stopped.  Continue PTA Zoloft.     Hyperlipidemia  Continue atorvastatin    Physical deconditioning from medical illness, senile frailty.  S/p left above-knee amputation, traumatic  - PT, OT consulted     Coccyx pressure Injury Stage: either Stage 2 or 3   Reported on previous admission, follows w/ [facility]   - [facility] RN to follow     Clinically Significant Risk Factors     # Hypertension: Noted on problem list    # Chronic heart failure with preserved ejection fraction: heart failure noted on problem list and last echo with EF >50%    #  "Acute Hypercapnic Respiratory Failure: based on venous blood gas results.  Continue supplemental oxygen and ventilatory support as indicated.    # DMII: A1C = 6.5 % (Ref range: <5.7 %) within past 6 months   # Obesity: Estimated body mass index is 37.43 kg/m  as calculated from the following:    Height as of this encounter: 1.651 m (5' 5\").    Weight as of this encounter: 102 kg (224 lb 14.4 oz)., PRESENT ON ADMISSION       # Financial/Environmental Concerns: none          Orders Placed This Encounter      Combination Diet Regular Diet; Thin Liquids (level 0) (Upright position during and 60 minutes after oral intake. Slow pace with rest breaks as needed. Choose softer foods. Upper denture in place.)      DVT Prophylaxis: SCDs, on DOAC.  Code Status: No CPR- Pre-arrest intubation OK  Disposition: Expected discharge in 2 days pending clinical improvement.  Discontinue IMC status.    Medically Ready for Discharge: Anticipated in 2-4 Days     Discussed with patient, bedside RN  >51 minutes spent by me on the date of service doing chart review, history, exam, documentation & further activities per the note.      Cyril Ward MD        Interval History:        Patient lying in bed.    Awake, arousable and able to answer all questions.  Denies any chest pain.    Complaining of shortness of breath on rest.  Was on BiPAP for some time overnight this morning on nasal oxygen  Overnight on BiPAP support.  No nausea or vomiting.  Denies any blood in the stool or blood in the urine or coughing or blood.  No abdominal pain.  See 1 unit of hemoglobin on 3/10, this morning hemoglobin in 8 range.  Per nursing report up with assist of 2, lift.  Telemetry sinus rhythm.         Physical Exam:        Physical Exam   Temp:  [98  F (36.7  C)-98.4  F (36.9  C)] 98.1  F (36.7  C)  Pulse:  [] 68  BP: (112-151)/(42-62) 112/46  FiO2 (%):  [30 %] 30 %  SpO2:  [94 %-98 %] 94 %    Intake/Output Summary (Last 24 hours) at 3/10/2025 " 1805  Last data filed at 3/10/2025 1530  Gross per 24 hour   Intake 410 ml   Output 3500 ml   Net -3090 ml       Admission Weight: 99.8 kg (220 lb)  Current Weight: 102 kg (224 lb 14.4 oz)    PHYSICAL EXAM  GENERAL: Patient is in no distress. Alert and oriented.  Frail-appearing  HEART: Regular rate and rhythm. S1S2.  Systolic murmur present.  LUNGS: Bilateral coarse breath sounds, no wheezing.  Respirations unlabored  ABDOMEN: Soft, no abdominal tenderness, bowel sounds heard.  PEG tube present.  NEURO: Moving all extremities.  EXTREMITIES: Pedal edema present.  SKIN: Warm, dry.  PSYCHIATRY Cooperative       Medications:        Current Facility-Administered Medications   Medication Dose Route Frequency Provider Last Rate Last Admin    - MEDICATION INSTRUCTIONS for Dialysis Patients -   Does not apply See Admin Instructions Cyril aWrd MD        amiodarone (PACERONE) tablet 200 mg  200 mg Oral or Feeding Tube BID Celine Damon APRN CNP   200 mg at 03/11/25 0938    amLODIPine (NORVASC) tablet 10 mg  10 mg Oral or Feeding Tube Once per day on Sunday Tuesday Thursday Saturday Celine Damon APRN CNP   10 mg at 03/11/25 0938    apixaban ANTICOAGULANT (ELIQUIS) tablet 5 mg  5 mg Oral or Feeding Tube BID Celine Damon APRN CNP   5 mg at 03/11/25 0938    atorvastatin (LIPITOR) tablet 20 mg  20 mg Oral or Feeding Tube QPM Celine Damon APRN CNP   20 mg at 03/10/25 2140    B and C vitamin Complex with folic acid (NEPHRONEX) liquid 5 mL  5 mL Per Feeding Tube Daily Cyril Ward MD   5 mL at 03/11/25 1426    calcitRIOL (ROCALTROL) capsule 0.5 mcg  0.5 mcg Oral or Feeding Tube Once per day on Monday Wednesday Friday Celine Damon APRN CNP   0.5 mcg at 03/10/25 0952    cetirizine (zyrTEC) tablet 10 mg  10 mg Oral or Feeding Tube At Bedtime Celine Damon APRN CNP   10 mg at 03/10/25 2140    furosemide (LASIX) tablet 80 mg  80 mg Oral or Feeding Tube Daily Celine Damon APRN  CNP   80 mg at 03/11/25 0937    hydrALAZINE (APRESOLINE) tablet 75 mg  75 mg Oral Q8H Celine Damon APRN CNP   75 mg at 03/11/25 1426    insulin aspart (NovoLOG) injection (RAPID ACTING)  1-7 Units Subcutaneous TID AC Celine Damon APRN CNP        insulin aspart (NovoLOG) injection (RAPID ACTING)  1-5 Units Subcutaneous At Bedtime Celine Damon APRN CNP        insulin glargine (LANTUS PEN) injection 10 Units  10 Units Subcutaneous BID Cyril Ward MD        midodrine (PROAMATINE) tablet 5 mg  5 mg Oral or Feeding Tube Once per day on Monday Wednesday Friday Celine Damon APRN CNP   5 mg at 03/10/25 0946    pantoprazole (PROTONIX) 2 mg/mL suspension 40 mg  40 mg Per Feeding Tube QAM  Celine Damon APRN CNP   40 mg at 03/10/25 0952    sertraline (ZOLOFT) tablet 25 mg  25 mg Oral Daily Celine Damon APRN CNP   25 mg at 03/11/25 0938    sertraline (ZOLOFT) tablet 50 mg  50 mg Oral Daily Celine Damon APRN CNP   50 mg at 03/11/25 0938    sodium chloride (PF) 0.9% PF flush 3 mL  3 mL Intracatheter Q8H Celine Damon APRN CNP   3 mL at 03/11/25 0938    sodium chloride (PF) 0.9% PF flush 3 mL  3 mL Intracatheter Q8H Kurt Escobar NP   3 mL at 03/11/25 0512     Current Facility-Administered Medications   Medication Dose Route Frequency Provider Last Rate Last Admin    acetaminophen (TYLENOL) tablet 650 mg  650 mg Oral Q4H PRN Celine Damon APRN CNP   650 mg at 03/11/25 0937    albuterol (PROVENTIL HFA/VENTOLIN HFA) inhaler  2 puff Inhalation Q6H PRN Celine Damon APRN CNP        calcium carbonate (TUMS) chewable tablet 1,000 mg  1,000 mg Oral 4x Daily PRN Celine Damon APRN CNP   1,000 mg at 03/11/25 0937    carboxymethylcellulose PF (REFRESH PLUS) 0.5 % ophthalmic solution 1 drop  1 drop Both Eyes Q1H PRN Kurt Escobar NP        dextrose 10% infusion   Intravenous Continuous PRN Cyril Ward MD        glucose gel  15-30 g  15-30 g Oral Q15 Min PRN Cyril Ward MD        Or    dextrose 50 % injection 25-50 mL  25-50 mL Intravenous Q15 Min PRN Cyril Ward MD        Or    glucagon injection 1 mg  1 mg Subcutaneous Q15 Min PRN Cyril Ward MD        ipratropium - albuterol 0.5 mg/2.5 mg/3 mL (DUONEB) neb solution 3 mL  3 mL Nebulization Q4H PRN Celine Damon APRN CNP        lidocaine (LMX4) cream   Topical Q1H PRN Kurt Escobar NP        lidocaine 1 % 0.1-1 mL  0.1-1 mL Other Q1H PRN Kurt Escobar NP        No lozenges or gum should be given while patient on BIPAP/AVAPS/AVAPS AE   Does not apply Continuous PRN Kurt Escobar NP        olopatadine (PATANOL) 0.1 % ophthalmic solution 1 drop  1 drop Both Eyes BID PRN Celine Damon APRN CNP        ondansetron (ZOFRAN ODT) ODT tab 4 mg  4 mg Oral Q6H PRN Celine Damon APRN CNP        Or    ondansetron (ZOFRAN) injection 4 mg  4 mg Intravenous Q6H PRN Celine Damon APRN CNP        Patient is already receiving anticoagulation with heparin, enoxaparin (LOVENOX), warfarin (COUMADIN)  or other anticoagulant medication   Does not apply Continuous PRN Celine Damon APRN CNP        Patient may continue current oral medications   Does not apply Continuous PRN Kurt Escobar NP        senna-docusate (SENOKOT-S/PERICOLACE) 8.6-50 MG per tablet 1 tablet  1 tablet Oral BID PRN Celine Damon APRN CNP        Or    senna-docusate (SENOKOT-S/PERICOLACE) 8.6-50 MG per tablet 2 tablet  2 tablet Oral BID PRN Celine Damon APRN CNP        sevelamer carbonate (RENVELA) tablet 800 mg  800 mg Oral BID PRN Celine Damon APRN CNP        sodium chloride (PF) 0.9% PF flush 3 mL  3 mL Intracatheter q1 min prn Celine Damon, PENELOPE CNP        sodium chloride (PF) 0.9% PF flush 3 mL  3 mL Intracatheter q1 min prn Kurt Escobar, KEIRA                Data:      All new lab and  imaging data was reviewed.

## 2025-03-11 NOTE — DISCHARGE INSTRUCTIONS
03/10/25 1407   Wound care  EVERY THIRD DAY      Comments: Coccyx: Every 3 days and as needed  Cleanse the area with NS and pat dry.  Apply No sting film barrier to periwound skin.  Cover wound with Sacral Mepilex (#[id number])  Turn and reposition Q 2hrs side to side only.  Ensure pt has Koko-cushion while sitting up in the chair.  FYI- If pt has constant incontinent loose stools needing dressing changes Q shift please discontinue the Mepilex dressing and apply criticaid barrier paste BID and PRN.

## 2025-03-11 NOTE — CONSULTS
"[facility]  WOC Nurse Inpatient Assessment     Consulted for: Wound pressure wound coccyx     Summary: patient with POA this admission and previous admit, skin damage to sacrococcygeal related to pressure, initial woc assessment 3/11 this admission     WO nurse follow-up plan: weekly    Patient History (according to provider note(s):      \"75 year old female with history of, anemia and CKD, anxiety, depression, former tobacco use, hypertension, dyslipidemia, obesity, JONE, s/p traumatic amputation of the leg, DM2, history of noncompliance with medical treatment, and ESRD on dialysis Monday/Wednesday/Friday admitted on 3/9/2025 for shortness of breath.     Of note, the patient was hospitalized at [facility] between 3/2/2025 - 3/6/2025 due to concerns of shortness of breath.  During this hospitalization, she was intermittently hypoxic but most of the time had oxygen saturations above 90 on room air.  Volume status was difficult to determine, thus a right heart cath was pursued showing RA pressures of 9, wedge was 20.  Patient was treated for acute respiratory failure 2/2 pneumonia, heart failure and A-fib, ultimately discharged to a transitional care unit.  The patient was also hospitalized in January 2025, requiring mechanical ventilation for respiratory failure secondary to influenza A.\"    Assessment:      Areas visualized during today's visit: Focused: and Sacrum/coccyx    Pressure Injury Location: buttock, sacrococcygeal     Last photo: 3/11  Wound type: Pressure Injury, Friction, Incontinence Associated Dermatitis (IAD), and Moisture Associated Skin Damage (MASD)     Pressure Injury Stage: 3, present on admission (based on pink scar tissue in the area, suggestive of previous full thickness skin injury        Wound history/plan of care:   found with mepilex in use     Wound base:  Dermis,      Palpation of the wound bed: normal and textured      Drainage: small     " "Description of drainage: serosanguinous     Measurements (length x width x depth, in cm) cluster of  5 x 7  x  0.1 cm      Tunneling N/A     Undermining N/A  Periwound skin: Superficial erosion, dry flaky, pink scar tissue       Color: pink      Temperature: normal   Odor: none  Pain: denies ,  complaint of work of breathing   Pain intervention prior to dressing change: patient tolerated well  Treatment goal: Heal , Maintain (prevention of deterioration), and Protection  STATUS: initial assessment  Supplies ordered: supplies stored on unit    My PI Risk Assessment     Sensory Perception: 4 - No impairment     Moisture: 2 - Very moist      Activity: 2 - Chairfast     Mobility: 2 - Very limited     Nutrition: 2 - Probably inadequate      Friction/Shear: 3 - No apparent problem      TOTAL: 15       Treatment Plan:     03/10/25 1408  Skin care precautions  EFFECTIVE NOW        Comments: Pressure Injury Prevention (PIP) Plan:  If patient is declining pressure injury prevention interventions: Explore reason why and address patient's concerns, Educate on pressure injury risk and prevention intervention(s), If patient is still declining, document \"informed refusal\" , and Ensure Care team is aware ( provider, charge nurse, etc)  Mattress: Follow bed algorithm, add Low Air Loss (Air+) mattress pump if skin is very moist or constantly moist.  HOB: Maintain at or below 30 degrees, unless contraindicated  Repositioning in bed: Every 1-2 hours , Left/right positioning; avoid supine, Raise foot of bed prior to raising head of bed, to reduce patient sliding down (shear), and Frequent microturns using positioning wedges, as patient tolerates  Heels: Keep elevated off mattress, Pillows under calves, and Heel lift boots  Protective Dressing: Sacral Mepilex for prevention (#961013),  especially for the agitated patient  Positioning Equipment:None  Chair positioning: Chair cushion (#772175) , Assist patient to reposition hourly, and Do " NOT use a donut for sitting (this increases pressure to smaller area and creates a higher potential for injury)    If patient has a buttock pressure injury, or high risk for PI use chair cushion or SPS.  Moisture Management: Perineal cleansing /protection: Follow Incontinence Protocol, Avoid brief in bed, Clean and dry skin folds with bathing , Consider InterDry (#409606) between folds, and Moisturize dry skin  Under Devices: Inspect skin under all medical devices during skin inspection , Ensure tubes are stabilized without tension, and Ensure patient is not lying on medical devices or equipment when repositioned  Ask provider to discontinue device when no longer needed.        03/10/25 1407  Wound care  EVERY THIRD DAY      Comments: Coccyx: Every 3 days and as needed  Cleanse the area with NS and pat dry.  Apply No sting film barrier to periwound skin.  Cover wound with Sacral Mepilex (#126311)  Turn and reposition Q 2hrs side to side only.  Ensure pt has Koko-cushion while sitting up in the chair.  FYI- If pt has constant incontinent loose stools needing dressing changes Q shift please discontinue the Mepilex dressing and apply criticaid barrier paste BID and PRN.        Orders: Written    RECOMMEND PRIMARY TEAM ORDER: None, at this time  Education provided: importance of repositioning, plan of care, Moisture management, Hygiene, and Off-loading pressure  Discussed plan of care with: Patient and Nurse  Notify WOC if wound(s) deteriorate.  Nursing to notify the Provider(s) and re-consult the WOC Nurse if new skin concern.    DATA:     Current support surface: Standard  Standard gel mattress (Isoflex)  Containment of urine/stool: Incontinence Protocol and Incontinent pad in bed  BMI: Body mass index is 37.43 kg/m .   Active diet order: Orders Placed This Encounter      Combination Diet Regular Diet; Thin Liquids (level 0) (Upright position during and 60 minutes after oral intake. Slow pace with rest breaks as  needed. Choose softer foods. Upper denture in place.)     Output: I/O last 3 completed shifts:  In: 1080 [P.O.:460; NG/GT:270]  Out: 3500 [Other:3500]     Labs:   Recent Labs   Lab 03/11/25  0702 03/10/25  1630 03/10/25  0642   ALBUMIN  --   --  3.1*   HGB 8.5*   < > 6.8*   WBC 5.8  --  5.2    < > = values in this interval not displayed.     Pressure injury risk assessment:   Sensory Perception: 3-->slightly limited  Moisture: 4-->rarely moist  Activity: 1-->bedfast  Mobility: 2-->very limited  Nutrition: 2-->probably inadequate  Friction and Shear: 2-->potential problem  Ben Score: 14    [facility] CWOCN   1st choice: Securely message with [employer] ([facility] [employer])   (2nd option: [facility] Office Phone [phone], messages checked periodically Mon-Fri 8a-4p)

## 2025-03-11 NOTE — CONSULTS
Inpatient Cardiology Consultation.   [facility]  Date of Admission: 3/9/2025  Date of Consult: March 11, 2025    PRIMARY CARDIOLOGY TEAM:  Dr. Khushboo Dickerson [facility].      Patient is a 75-year-old female with end-stage renal disease on hemodialysis, oxygen dependent COPD, chronic HFpEF who has been admitted with 10 pound fluid overload, seen by nephrology, additional fluid pull planned during dialysis.    Volume management through dialysis.  Being seen by nephrology.  Currently patient does not have active cardiac issues.  Patient not seen.  Continue current cardiac medications.If there are ongoing cardiology specific issues despite adequate fluid pull and dialysis, please page us.      Magalis Keys MD, MD [facility]  Cardiology    No charge note.      CODE STATUS:  No CPR- Pre-arrest intubation OK    CKD POA List: ESRD on dialysis      Magalis Keys MD, MD    This note was completed in part using dictation via the Dragon voice recognition software. Some word and grammatical errors may occur and must be interpreted in the appropriate clinical context. If there are any questions pertaining to this issue, please contact me for further clarification.

## 2025-03-11 NOTE — PROGRESS NOTES
"SPIRITUAL HEALTH SERVICES - Consult Note  [facility]  Referral Source/Reason for Visit: Follow up visit for support    Summary and Recommendations -  Supriya was raised in the Religion [place] but is not currently involved in a aleksandr community.  She welcomed the offer of prayer, wanting to say the Lord's Prayer together.  Supriya also appreciated receiving an olive wood holding cross.    Plan: Spiritual Health remains available for support. Please consult as needs arise or as requested.    Cindy Pak M.Div.  Staff     SHS available 24/7 for emergent requests/referrals, either by paging the on-call  or by entering an ASAP/STAT consult in [facility], which will also page the on-call .    Assessment    Saw pt Supriya Herr per follow up visit.    Patient/Family Understanding of Illness and Goals of Care - Supriya hopes that after she goes to the TCU, that she'll regain enough strength to return home.    Distress and Loss - Supriya reflected on the fear she felt when she was struggling to breathe and came back into the hospital from the TCU.     Strengths, Coping, and Resources   Supriya named her family (daughter, son-in-law, grandson, and sister) as her core source of support.   She reflected the things that have brought her \"veronica\" in her life - making lefse with her mother, gardening flowers with her father, and fishing with her parents, aunts, and uncles. She also enjoys coloring with her 10 yo grandson.    Meaning, Beliefs, and Spirituality   Supriya was raised in the Religion [place] but is not currently involved in a aleksandr community.  She welcomed the offer of prayer, wanting to say the Lord's Prayer together.  Supriya also appreciated receiving an olive wood holding cross.    "

## 2025-03-11 NOTE — PROGRESS NOTES
" Renal Medicine Progress Note            Assessment/Plan:     # ESRD               -MWF               -LAVF               -[facility]     # HTN: BP is at target.   # IDDM  # Anemia  # Normal LV/RV function     Plan:  # HD order placed for tomorow          Interval History:     Afebrile. VSS  Using 1 liter O2.   \"A little upset stomach, but feels better today,\" she says.             Medications and Allergies:     Current Facility-Administered Medications   Medication Dose Route Frequency Provider Last Rate Last Admin    - MEDICATION INSTRUCTIONS for Dialysis Patients -   Does not apply See Admin Instructions Cyril Ward MD        amiodarone (PACERONE) tablet 200 mg  200 mg Oral or Feeding Tube BID Celine Damon APRN CNP   200 mg at 03/11/25 0938    amLODIPine (NORVASC) tablet 10 mg  10 mg Oral or Feeding Tube Once per day on Sunday Tuesday Thursday Saturday Celine Damon APRN CNP   10 mg at 03/11/25 0938    apixaban ANTICOAGULANT (ELIQUIS) tablet 5 mg  5 mg Oral or Feeding Tube BID Celine Damon APRN CNP   5 mg at 03/11/25 0938    atorvastatin (LIPITOR) tablet 20 mg  20 mg Oral or Feeding Tube QPM Celine Damon APRN CNP   20 mg at 03/10/25 2140    B and C vitamin Complex with folic acid (NEPHRONEX) liquid 5 mL  5 mL Per Feeding Tube Daily Cyril Ward MD   5 mL at 03/11/25 1426    calcitRIOL (ROCALTROL) capsule 0.5 mcg  0.5 mcg Oral or Feeding Tube Once per day on Monday Wednesday Friday Celine Damon APRN CNP   0.5 mcg at 03/10/25 0952    cetirizine (zyrTEC) tablet 10 mg  10 mg Oral or Feeding Tube At Bedtime Celine Damon APRN CNP   10 mg at 03/10/25 2140    furosemide (LASIX) tablet 80 mg  80 mg Oral or Feeding Tube Daily Celine Damon APRN CNP   80 mg at 03/11/25 0937    hydrALAZINE (APRESOLINE) tablet 75 mg  75 mg Oral Q8H Celine Damon APRN CNP   75 mg at 03/11/25 1426    insulin aspart (NovoLOG) injection (RAPID ACTING)  1-7 Units Subcutaneous TID " " Celine Damon APRN CNP        insulin aspart (NovoLOG) injection (RAPID ACTING)  1-5 Units Subcutaneous At Bedtime Celine Damon APRN CNP        insulin glargine (LANTUS PEN) injection 10 Units  10 Units Subcutaneous BID Cyril Ward MD        midodrine (PROAMATINE) tablet 5 mg  5 mg Oral or Feeding Tube Once per day on Monday Wednesday Friday Celine Damon APRN CNP   5 mg at 03/10/25 0946    pantoprazole (PROTONIX) 2 mg/mL suspension 40 mg  [facility] Celine Damon APRN CNP   40 mg at 03/10/25 0952    sertraline (ZOLOFT) tablet 25 mg  25 mg Oral Daily Celine Damon APRN CNP   25 mg at 03/11/25 0938    sertraline (ZOLOFT) tablet 50 mg  50 mg Oral Daily Celine Damon APRN CNP   50 mg at 03/11/25 0938    sodium chloride (PF) 0.9% PF flush 3 mL  3 mL Intracatheter Q8H Celine Damon APRN CNP   3 mL at 03/11/25 0938    sodium chloride (PF) 0.9% PF flush 3 mL  3 mL Intracatheter Q8H Kurt Escobar, NP   3 mL at 03/11/25 0512        Allergies   Allergen Reactions    Ampicillin-Sulbactam Sodium Rash     No evidence SJS, but very uncomfortable and precipitated multiple provider visits. Would not use penicillins again if other options available.     Penicillins Rash            Physical Exam:   Vitals were reviewed   , Blood pressure 112/46, pulse 68, temperature 98.1  F (36.7  C), temperature source Oral, resp. rate 28, height 1.651 m (5' 5\"), weight 102 kg (224 lb 14.4 oz), SpO2 94%, not currently breastfeeding.    Wt Readings from Last 3 Encounters:   03/09/25 102 kg (224 lb 14.4 oz)   03/06/25 101.8 kg (224 lb 6.9 oz)   02/26/25 92.5 kg (203 lb 14.8 oz)       Intake/Output Summary (Last 24 hours) at 3/11/2025 1555  Last data filed at 3/11/2025 1400  Gross per 24 hour   Intake 1010 ml   Output --   Net 1010 ml     GENERAL APPEARANCE: NAD  HEENT:  Eyes/ears/nose/neck grossly normal  RESP: lungs cta b c good efforts, no crackles, rhonchi or " wheezes  CV: RRR  ABDOMEN: o/s/nt/nd, bs present  EXTREMITIES/SKIN:No edema. L AKA  NEURO: Awake, alert and answering questions     L upper AVF + bruit         Data:     CBC RESULTS:     Recent Labs   Lab 03/11/25  0702 03/10/25  1630 03/10/25  0642 03/09/25  1050 03/06/25  0633   WBC 5.8  --  5.2 5.6 5.2   RBC 2.83*  --  2.22* 2.31* 2.53*   HGB 8.5* 8.3* 6.8* 7.0* 7.7*   HCT 27.4*  --  21.9* 23.1* 25.4*     --  143* 158 169       Basic Metabolic Panel:  Recent Labs   Lab 03/11/25  1140 03/11/25  0702 03/11/25  0509 03/11/25  0218 03/11/25  0142 03/10/25  2151 03/10/25  0934 03/10/25  0642 03/09/25  1759 03/09/25  1050 03/05/25  1824 03/05/25  1356   NA  --  137  --   --   --   --   --  138  --  135  --   --    POTASSIUM  --  4.0  --   --   --   --   --  4.7  --  4.8  --  4.3   CHLORIDE  --  97*  --   --   --   --   --  101  --  97*  --   --    CO2  --  29  --   --   --   --   --  26  --  27  --   --    BUN  --  26.2*  --   --   --   --   --  56.6*  --  47.6*  --   --    CR  --  2.89*  --   --   --   --   --  4.79*  --  3.78*  --   --    * 97 86 104* 67* 106*   < > 87   < > 343*   < >  --    JODY  --  9.7  --   --   --   --   --  9.9  --  9.9  --   --     < > = values in this interval not displayed.       INRNo lab results found in last 7 days.   Attestation:   I have reviewed today's relevant vital signs, notes, medications, labs and imaging.    Tian Lim MD  [facility] - Nephrology  Office phone :[phone]  Pager: [phone]

## 2025-03-11 NOTE — PLAN OF CARE
Goal Outcome Evaluation:    Orientations: A&O x4, intermittent confusion/forgetfulness  Vitals/Pain: VSS on 2 L NC, attempted Bipap twice but only tolerated for an hour each time  Tele: SR/afib CVR at times, MD notified  Lines/Drains: PIV x1 SL  Skin/Wounds: Coccyx PI, LUE fistula  GI/: BM x1, on HD, voided small amount x1  PEG tube clamped  Labs: BG dropped to 67, lantus held  Ambulation/Assist: not OOB, turn/repo  Sleep Quality: poor  Plan: speech and WOC consult

## 2025-03-11 NOTE — PROGRESS NOTES
"CLINICAL SWALLOW EVALUATION     03/11/25 0954   Appointment Info   Signing Clinician's Name / Credentials (SLP) Kelly Bhagat [facility]   General Information   Onset of Illness/Injury or Date of Surgery 03/09/25   Referring Physician Cyril Ward MD   Patient/Family Therapy Goal Statement (SLP) Patient did not state.   Pertinent History of Current Problem Per provider note: \"Supriya Herr is a 75 year old female with history of, anemia and CKD, anxiety, depression, former tobacco use, hypertension, dyslipidemia, obesity, JONE, s/p traumatic amputation of the leg, DM2, history of noncompliance with medical treatment, and ESRD on dialysis Monday/Wednesday/Friday admitted on 3/9/2025 for shortness of breath. CXR 3/9/2025 showing bilateral interstitial and alveolar opacities, small pleural effusions, mild cardiomegaly, findings suggestive of pulmonary edema and volume overload. Percutaneous endoscopic gastrostomy (PEG) tube placement prior hospital stay, with concerns of nutrition and swallowing per daughter.  *Esophagram 3/4-normal. Of note, the patient was hospitalized at [facility] between 3/2/2025 - 3/6/2025 due to concerns of shortness of breath.  During this hospitalization, she was intermittently hypoxic but most of the time had oxygen saturations above 90 on room air.  Volume status was difficult to determine, thus a right heart cath was pursued showing RA pressures of 9, wedge was 20.  Patient was treated for acute respiratory failure 2/2 pneumonia, heart failure and A-fib, ultimately discharged to a transitional care unit.  The patient was also hospitalized in January 2025, requiring mechanical ventilation for respiratory failure secondary to influenza A.\"   General Observations Patient alert, cooperative, pleasant yet directive with cares. RN reported patient's daughter indicated patient had been eating soft foods cut into small pieces at TCU.   Type of Evaluation   Type of Evaluation " Swallow Evaluation   Oral Motor   Oral Musculature generally intact   Structural Abnormalities none present   Mucosal Quality adequate   Dentition (Oral Motor)   Dentition (Oral Motor) edentulous;dental appliance/dentures   Comment, Dentition (Oral Motor) Upper dentures placed. Patient reported lower dentures are uncomfortable and does not wear them.   Dental Appliance/Denture (Oral Motor) upper   Vocal Quality/Secretion Management (Oral Motor)   Vocal Quality (Oral Motor) WFL   Secretion Management (Oral Motor) WNL   General Swallowing Observations   Current Diet/Method of Nutritional Intake (General Swallowing Observations, NIS) minced and moist (dysphagia mechanically altered) (level 5);thin liquids (level 0);gastrostomy tube (PEG)   Respiratory Support nasal cannula  (2L)   Past History of Dysphagia Patient seen by SLP service for swallowing prior recent admissions. VFSS 1/31/2025 with flash laryngeal penetration of thin liquids when taking large sequential sips. Patient discharged on easy to chew diet and thin liquids at that time. Clinical swallow evaluation 3/3/2025 subsequent admission with recommendation for regular diet and thin liquids given swallow strategies and reflux precautions. Paperwork from TCU indicates patient was on regular diet and thin liquids PTA.   Swallowing Evaluation Clinical swallow evaluation   Clinical Swallow Evaluation   Feeding Assistance set up only required   Clinical Swallow Evaluation Textures Trialed thin liquids;pureed;solid foods   Clinical Swallow Eval: Thin Liquid Texture Trial   Mode of Presentation, Thin Liquids straw;self-fed   Volume of Liquid or Food Presented 2 oz water   Oral Phase of Swallow WFL   Pharyngeal Phase of Swallow   (no overt aspiration signs)   Clinical Swallow Evaluation: Puree Solid Texture Trial   Mode of Presentation, Puree spoon;self-fed   Volume of Puree Presented 4 bites oatmeal   Oral Phase, Puree WFL   Pharyngeal Phase, Puree   (no overt  aspiration signs)   Clinical Swallow Evaluation: Solid Food Texture Trial   Mode of Presentation self-fed   Volume Presented 1 soda cracker   Oral Phase WFL   Pharyngeal Phase   (no overt aspiration signs)   General Therapy Interventions   Planned Therapy Interventions Dysphagia Treatment   Dysphagia treatment Instruction of safe swallow strategies   Clinical Impression   Criteria for Skilled Therapeutic Interventions Met (SLP Eval) Yes, treatment indicated   SLP Diagnosis Minimal dysphagia   Risks & Benefits of therapy have been explained evaluation/treatment results reviewed;care plan/treatment goals reviewed;risks/benefits reviewed;current/potential barriers reviewed;participants voiced agreement with care plan;participants included;patient   Clinical Impression Comments Clinical swallow evaluation completed per MD order. Patient presents with minimal oropharyngeal dysphagia based on clinical exam. Oral mech exam remarkable for edentulous state with upper dentures placed (lower dentures not present and are uncomfortable to wear). Patient assessed with thin liquid, puree and solid. No overt aspiration signs occurred across intake. Note functional mastication of solid and no oral residue remained. Patient took small bites and sips independently. Patinet reported no sensation of bolus sticking in pharynx. Recommend advance to regular diet and continue thin liquids (IDDSI 0) given swallow strategies (fully upright position, small sips/bites, slow pace with rest breaks as needed) and reflux precautions (remain upright 60 minutes after oral intake, elevate HOB 30 degrees, do not lay down 2-3 hours after meals). Crush pills if possible or trial small pills one at a time wtih puree followed by sips of water. SLP to follow for short-course for dysphagia.   SLP Total Evaluation Time   Eval: oral/pharyngeal swallow function, clinical swallow Minutes (73354) 10   SLP Goals   Therapy Frequency (SLP Eval) 3 times/week   SLP  Predicted Duration/Target Date for Goal Attainment 03/25/25   SLP Goals Swallow   SLP: Safely tolerate diet without signs/symptoms of aspiration Regular diet;Thin liquids;With use of swallow precautions   Swallowing Intervention               SLP Discharge Planning       SLP Discharge Recommendation Transitional Care Facility   SLP Rationale for DC Rec Do not anticipate SLP needs at discharge.   SLP Brief overview of current status  Recommend advance to regular diet and continue thin liquids (IDDSI 0) given swallow strategies (fully upright position, small sips/bites, slow pace with rest breaks as needed) and reflux precautions (remain upright 60 minutes after oral intake, elevate HOB 30 degrees, do not lay down 2-3 hours after meals). Crush pills if possible or trial small pills one at a time wtih puree followed by sips of water. SLP to follow for short-course for dysphagia.   SLP Time and Intention

## 2025-03-12 LAB
BASE EXCESS BLDV CALC-SCNC: 5.8 MMOL/L (ref -3–3)
GLUCOSE BLDC GLUCOMTR-MCNC: 119 MG/DL (ref 70–99)
GLUCOSE BLDC GLUCOMTR-MCNC: 122 MG/DL (ref 70–99)
GLUCOSE BLDC GLUCOMTR-MCNC: 192 MG/DL (ref 70–99)
GLUCOSE BLDC GLUCOMTR-MCNC: 206 MG/DL (ref 70–99)
GLUCOSE BLDC GLUCOMTR-MCNC: 96 MG/DL (ref 70–99)
HCO3 BLDV-SCNC: 32 MMOL/L (ref 21–28)
HGB BLD-MCNC: 8.3 G/DL (ref 11.7–15.7)
O2/TOTAL GAS SETTING VFR VENT: 4 %
OXYHGB MFR BLDV: 87 % (ref 70–75)
PCO2 BLDV: 53 MM HG (ref 40–50)
PH BLDV: 7.39 [PH] (ref 7.32–7.43)
PO2 BLDV: 55 MM HG (ref 25–47)
SAO2 % BLDV: 89.3 % (ref 70–75)

## 2025-03-12 PROCEDURE — 99232 SBSQ HOSP IP/OBS MODERATE 35: CPT | Performed by: INTERNAL MEDICINE

## 2025-03-12 PROCEDURE — 250N000013 HC RX MED GY IP 250 OP 250 PS 637: Performed by: HOSPITALIST

## 2025-03-12 PROCEDURE — 90937 HEMODIALYSIS REPEATED EVAL: CPT

## 2025-03-12 PROCEDURE — 258N000003 HC RX IP 258 OP 636: Performed by: INTERNAL MEDICINE

## 2025-03-12 PROCEDURE — 999N000127 HC STATISTIC PERIPHERAL IV START W US GUIDANCE

## 2025-03-12 PROCEDURE — 999N000157 HC STATISTIC RCP TIME EA 10 MIN

## 2025-03-12 PROCEDURE — 36415 COLL VENOUS BLD VENIPUNCTURE: CPT | Performed by: HOSPITALIST

## 2025-03-12 PROCEDURE — 99233 SBSQ HOSP IP/OBS HIGH 50: CPT | Performed by: HOSPITALIST

## 2025-03-12 PROCEDURE — 94660 CPAP INITIATION&MGMT: CPT

## 2025-03-12 PROCEDURE — 120N000001 HC R&B MED SURG/OB

## 2025-03-12 PROCEDURE — 99233 SBSQ HOSP IP/OBS HIGH 50: CPT | Performed by: STUDENT IN AN ORGANIZED HEALTH CARE EDUCATION/TRAINING PROGRAM

## 2025-03-12 PROCEDURE — 250N000013 HC RX MED GY IP 250 OP 250 PS 637: Performed by: NURSE PRACTITIONER

## 2025-03-12 PROCEDURE — 250N000011 HC RX IP 250 OP 636: Performed by: INTERNAL MEDICINE

## 2025-03-12 PROCEDURE — 85018 HEMOGLOBIN: CPT | Performed by: HOSPITALIST

## 2025-03-12 PROCEDURE — 82805 BLOOD GASES W/O2 SATURATION: CPT

## 2025-03-12 RX ORDER — ALBUMIN (HUMAN) 12.5 G/50ML
50 SOLUTION INTRAVENOUS
Status: DISCONTINUED | OUTPATIENT
Start: 2025-03-12 | End: 2025-03-14

## 2025-03-12 RX ORDER — HEPARIN SODIUM 1000 [USP'U]/ML
500 INJECTION, SOLUTION INTRAVENOUS; SUBCUTANEOUS CONTINUOUS
Status: DISCONTINUED | OUTPATIENT
Start: 2025-03-12 | End: 2025-03-14

## 2025-03-12 RX ADMIN — AMIODARONE HYDROCHLORIDE 200 MG: 200 TABLET ORAL at 16:33

## 2025-03-12 RX ADMIN — SODIUM CHLORIDE 250 ML: 0.9 INJECTION, SOLUTION INTRAVENOUS at 13:32

## 2025-03-12 RX ADMIN — SODIUM CHLORIDE 200 ML: 0.9 INJECTION, SOLUTION INTRAVENOUS at 13:31

## 2025-03-12 RX ADMIN — HEPARIN SODIUM 500 UNITS/HR: 1000 INJECTION INTRAVENOUS; SUBCUTANEOUS at 13:33

## 2025-03-12 RX ADMIN — ATORVASTATIN CALCIUM 20 MG: 20 TABLET, FILM COATED ORAL at 21:19

## 2025-03-12 RX ADMIN — HYDRALAZINE HYDROCHLORIDE 75 MG: 50 TABLET ORAL at 21:21

## 2025-03-12 RX ADMIN — SERTRALINE HYDROCHLORIDE 50 MG: 50 TABLET ORAL at 08:58

## 2025-03-12 RX ADMIN — Medication 60 ML: at 16:33

## 2025-03-12 RX ADMIN — Medication 40 MG: at 06:36

## 2025-03-12 RX ADMIN — HEPARIN SODIUM 500 UNITS: 1000 INJECTION INTRAVENOUS; SUBCUTANEOUS at 13:32

## 2025-03-12 RX ADMIN — HYDRALAZINE HYDROCHLORIDE 75 MG: 50 TABLET ORAL at 16:33

## 2025-03-12 RX ADMIN — APIXABAN 5 MG: 5 TABLET, FILM COATED ORAL at 08:58

## 2025-03-12 RX ADMIN — CALCITRIOL CAPSULES 0.25 MCG 0.5 MCG: 0.25 CAPSULE ORAL at 16:34

## 2025-03-12 RX ADMIN — FUROSEMIDE 80 MG: 40 TABLET ORAL at 16:33

## 2025-03-12 RX ADMIN — AMIODARONE HYDROCHLORIDE 200 MG: 200 TABLET ORAL at 21:20

## 2025-03-12 RX ADMIN — APIXABAN 5 MG: 5 TABLET, FILM COATED ORAL at 21:20

## 2025-03-12 RX ADMIN — Medication 5 ML: at 16:34

## 2025-03-12 RX ADMIN — MIDODRINE HYDROCHLORIDE 5 MG: 5 TABLET ORAL at 08:58

## 2025-03-12 RX ADMIN — HYDRALAZINE HYDROCHLORIDE 75 MG: 50 TABLET ORAL at 06:11

## 2025-03-12 RX ADMIN — SERTRALINE HYDROCHLORIDE 25 MG: 25 TABLET ORAL at 08:57

## 2025-03-12 RX ADMIN — CETIRIZINE HYDROCHLORIDE 10 MG: 10 TABLET, FILM COATED ORAL at 21:19

## 2025-03-12 ASSESSMENT — ACTIVITIES OF DAILY LIVING (ADL)
ADLS_ACUITY_SCORE: 79
ADLS_ACUITY_SCORE: 78
ADLS_ACUITY_SCORE: 79
ADLS_ACUITY_SCORE: 78
ADLS_ACUITY_SCORE: 79
ADLS_ACUITY_SCORE: 79
ADLS_ACUITY_SCORE: 78
ADLS_ACUITY_SCORE: 79
ADLS_ACUITY_SCORE: 78
ADLS_ACUITY_SCORE: 78
ADLS_ACUITY_SCORE: 79
ADLS_ACUITY_SCORE: 78
ADLS_ACUITY_SCORE: 79
ADLS_ACUITY_SCORE: 79

## 2025-03-12 NOTE — PROGRESS NOTES
Potassium   Date Value Ref Range Status   03/11/2025 4.0 3.4 - 5.3 mmol/L Final   02/02/2022 4.7 3.4 - 5.3 mmol/L Final   04/17/2021 4.2 3.4 - 5.3 mmol/L Final       DIALYSIS PROCEDURE NOTE  Hepatitis status of previous patient on machine log was checked and verified ok to use with this patients hepatitis status.  Patient dialyzed for 3.5 hrs. on a K3 bath with a net fluid removal of  3 L.  A BFR of 350 ml/min was obtained via a left arm AVfistula using 16 gauge needles.      The treatment plan was discussed with Dr. Lim during the treatment.    Total heparin received during the treatment: 1800 units.   Needle cannulation sites held x 10 min.       Meds  given: None   Complications: None      Person educated: pt. Knowledge base substantial. Barriers to learning: none. Educated on procedure via verbal mode. Patient verbalized understanding.   ICEBOAT? Timeout performed pre-treatment  I: Patient was identified using 2 identifiers  C:  Consent Signed Yes  E: Equipment preventative maintenance is current and dialysis delivery system OK to use  B: Hepatitis B Surface Antigen: Negative; Draw Date: 3/5/2025      Hepatitis B Surface Antibody: Susceptible; Draw Date: 3/3/2025  O: Dialysis orders present and complete prior to treatment  A: Vascular access verified and assessed prior to treatment  T: Treatment was performed at a clinically appropriate time  ?: Patient was allowed to ask questions and address concerns prior to treatment  See Adult Hemodialysis flowsheet in cloudControl for further details and post assessment.  Machine water alarm in place and functioning. Transducer pods intact and checked every 15min.   Pt assisted with repositioning throughout dialysis treatment.  Pt returned via bed.  Chlorine/Chloramine water system checked every 4 hours.  Outpatient Dialysis at [facility].      Post treatment report given to Marianne REYES RN regarding 3 L of fluid removed, last BP    [facility]

## 2025-03-12 NOTE — PROGRESS NOTES
Care Management Follow Up    Length of Stay (days): 3    Expected Discharge Date: 03/12/2025     Concerns to be Addressed: discharge planning     Patient plan of care discussed at interdisciplinary rounds: Yes    Anticipated Discharge Disposition: Transitional Care       Anticipated Discharge Services:    Anticipated Discharge DME:      Patient/family educated on Medicare website which has current facility and service quality ratings: no  Education Provided on the Discharge Plan: Yes  Patient/Family in Agreement with the Plan: yes    Referrals Placed by CM/SW: Post Acute Facilities  Private pay costs discussed: Not applicable    Discussed  Partnership in Safe Discharge Planning  document with patient/family: No     Handoff Completed: Yes, [facility] PCP: Internal handoff referral completed    Additional Information:  Spoke with pt's daughter, Caroline Gray. Goal remains return to [facility]. Encouraged Caroline Gray to ask for a copy of pt's discharge orders so she they are correct and knows what the facility should be following.     Next Steps: SW following for discharge planning.     KAITLYNN Mullins, LICSW  [phone] Desk phone  [phone] Cell/text (Preferred)  [facility]

## 2025-03-12 NOTE — PROGRESS NOTES
" Renal Medicine Progress Note            Assessment/Plan:     # ESRD               -MWF               -LAVF               -[facility]     # HTN: BP is at target.   # IDDM  # Anemia  # Normal LV/RV function     Plan:  # She tolerated 3 liters net UF with HD well. Next HD tx is on Friday.          Interval History:     Afebrile.   VSS.   \"I feel great,\" she says.             Medications and Allergies:     Current Facility-Administered Medications   Medication Dose Route Frequency Provider Last Rate Last Admin    - MEDICATION INSTRUCTIONS for Dialysis Patients -   Does not apply See Admin Instructions Cyril Ward MD        amiodarone (PACERONE) tablet 200 mg  200 mg Oral or Feeding Tube BID Celine Damon APRN CNP   200 mg at 03/11/25 2139    amLODIPine (NORVASC) tablet 10 mg  10 mg Oral or Feeding Tube Once per day on Sunday Tuesday Thursday Saturday Celine Damon APRN CNP   10 mg at 03/11/25 0938    apixaban ANTICOAGULANT (ELIQUIS) tablet 5 mg  5 mg Oral or Feeding Tube BID Celine Damon APRN CNP   5 mg at 03/12/25 0858    atorvastatin (LIPITOR) tablet 20 mg  20 mg Oral or Feeding Tube QPM Celine Damon APRN CNP   20 mg at 03/11/25 2139    B and C vitamin Complex with folic acid (NEPHRONEX) liquid 5 mL  5 mL Per Feeding Tube Daily Cyril Ward MD   5 mL at 03/11/25 1426    calcitRIOL (ROCALTROL) capsule 0.5 mcg  0.5 mcg Oral or Feeding Tube Once per day on Monday Wednesday Friday Celine Damon APRN CNP   0.5 mcg at 03/10/25 0952    cetirizine (zyrTEC) tablet 10 mg  10 mg Oral or Feeding Tube At Bedtime Celine Damon APRN CNP   10 mg at 03/11/25 2139    furosemide (LASIX) tablet 80 mg  80 mg Oral or Feeding Tube Daily Celine Damon APRN CNP   80 mg at 03/11/25 0937    hydrALAZINE (APRESOLINE) tablet 75 mg  75 mg Oral Q8H Celine Damon APRN CNP   75 mg at 03/12/25 0611    insulin aspart (NovoLOG) injection (RAPID ACTING)  1-7 Units Subcutaneous TID AC " "Celine Damon APRN CNP   2 Units at 03/12/25 0900    insulin aspart (NovoLOG) injection (RAPID ACTING)  1-5 Units Subcutaneous At Bedtime Celine Damon APRN CNP   1 Units at 03/11/25 2236    insulin glargine (LANTUS PEN) injection 15 Units  15 Units Subcutaneous BID Cyril Ward MD        midodrine (PROAMATINE) tablet 5 mg  5 mg Oral or Feeding Tube Once per day on Monday Wednesday Friday Celine Damon APRN CNP   5 mg at 03/12/25 0858    pantoprazole (PROTONIX) 2 mg/mL suspension 40 mg  40 mg Per Feeding Tube QAM AC Celine Damon APRN CNP   40 mg at 03/12/25 0636    sertraline (ZOLOFT) tablet 25 mg  25 mg Oral Daily Celine Damon APRN CNP   25 mg at 03/12/25 0857    sertraline (ZOLOFT) tablet 50 mg  50 mg Oral Daily Celine Damon APRN CNP   50 mg at 03/12/25 0858    sodium chloride (PF) 0.9% PF flush 3 mL  3 mL Intracatheter Q8H Celine Damon APRN CNP   3 mL at 03/12/25 0129    wound support modular (EXPEDITE) bottle 60 mL  60 mL Oral Daily Cyril Ward MD            Allergies   Allergen Reactions    Ampicillin-Sulbactam Sodium Rash     No evidence SJS, but very uncomfortable and precipitated multiple provider visits. Would not use penicillins again if other options available.     Penicillins Rash            Physical Exam:   Vitals were reviewed   , Blood pressure 123/42, pulse 62, temperature 98.2  F (36.8  C), temperature source Oral, resp. rate 24, height 1.651 m (5' 5\"), weight 93.6 kg (206 lb 5.6 oz), SpO2 99%, not currently breastfeeding.    Wt Readings from Last 3 Encounters:   03/12/25 93.6 kg (206 lb 5.6 oz)   03/06/25 101.8 kg (224 lb 6.9 oz)   02/26/25 92.5 kg (203 lb 14.8 oz)       Intake/Output Summary (Last 24 hours) at 3/12/2025 1421  Last data filed at 3/12/2025 0630  Gross per 24 hour   Intake 1299 ml   Output --   Net 1299 ml       GENERAL APPEARANCE: NAD  HEENT:  Eyes/ears/nose/neck grossly normal  RESP: lungs cta b c good efforts, no crackles, " rhonchi or wheezes  CV: RRR  ABDOMEN: o/s/nt/nd, bs present  EXTREMITIES/SKIN:No edema. L AKA  NEURO: Awake, alert and answering questions              Data:     CBC RESULTS:     Recent Labs   Lab 03/12/25  0603 03/11/25  0702 03/10/25  1630 03/10/25  0642 03/09/25  1050 03/06/25  0633   WBC  --  5.8  --  5.2 5.6 5.2   RBC  --  2.83*  --  2.22* 2.31* 2.53*   HGB 8.3* 8.5* 8.3* 6.8* 7.0* 7.7*   HCT  --  27.4*  --  21.9* 23.1* 25.4*   PLT  --  170  --  143* 158 169       Basic Metabolic Panel:  Recent Labs   Lab 03/12/25  0826 03/12/25  0128 03/11/25  2232 03/11/25  1708 03/11/25  1140 03/11/25  0702 03/10/25  0934 03/10/25  0642 03/09/25  1759 03/09/25  1050 03/05/25  1824 03/05/25  1356   NA  --   --   --   --   --  137  --  138  --  135  --   --    POTASSIUM  --   --   --   --   --  4.0  --  4.7  --  4.8  --  4.3   CHLORIDE  --   --   --   --   --  97*  --  101  --  97*  --   --    CO2  --   --   --   --   --  29  --  26  --  27  --   --    BUN  --   --   --   --   --  26.2*  --  56.6*  --  47.6*  --   --    CR  --   --   --   --   --  2.89*  --  4.79*  --  3.78*  --   --    * 192* 209* 103* 129* 97   < > 87   < > 343*   < >  --    JODY  --   --   --   --   --  9.7  --  9.9  --  9.9  --   --     < > = values in this interval not displayed.       INRNo lab results found in last 7 days.   Attestation:   I have reviewed today's relevant vital signs, notes, medications, labs and imaging.    Tian Lim MD  [facility] - Nephrology  Office phone :[phone]  Pager: [phone]

## 2025-03-12 NOTE — PLAN OF CARE
Goal Outcome Evaluation:    Orientations: A&O x4, forgetful  Vitals/Pain: VSS on 1-2 L, wore CPAP from 5419-6537  Tele: SR  Lines/Drains: Lost IV access, VAT consult placed, LUE AV fistula  Skin/Wounds: PI to coccyx, wound care completed  GI/: No urine output - on hemodialysis  3 small/smear BMS  PEG tube -TF infusing from 1319-8908  Regular diet with thin liquids, 1500ml FR  Labs: BG ACHS  Ambulation/Assist: Not OOB, lift, turn/repo  Sleep Quality: good  Plan: Dialysis today, pulmonology to follow up on VBGs after wearing CPAP

## 2025-03-12 NOTE — PROGRESS NOTES
A&O. VSS on 2L NC. Tele SR. Lung sounds coarse throughout. Cheikh RLE. AKA LLE. Lift/T&R. PIV's SL. Fistula to LUE, thrill/bruit present. PEG tube infusing TF, stop at 6:30am as it was started late. Known PI to coccyx, WOC following. T&R. Bmx2 today on bedpan. Only voided once - hemodialysis. ACHS BG checks, did not give AM lantus as TF wasn't established yet. Advanced to regular diet with thin liquids. Ate well this evening.     Plan: Pt to do CPAP setting tonight with VBG in AM. Plan for dialysis tomorrow.

## 2025-03-12 NOTE — PROGRESS NOTES
NUTRITION BRIEF NOTE     Notified from provider about slight change to tube feeding regimen.     Previously patient was receiving Jevity 1.5 @ 65 mL/hr over 14 hour cycle (4 pm-6am). Provided  1365 cals (21 cals/kg), 58 gm pro (0.9 gm/kg), 19 gm fiber, 692 mL free water, 1984 mg K, 1138 mg Phos.     MD adjusted regimen to a shorter cycle: Jevity 1.5 @ 65 mL/hr over 10 hour cycle (8pm-6am).  New regimen provides 650 mL of formula, 975 kcal, 41 g protein, 140 g CHO, 13 g fiber, 494 mL water  FWF: 60 mL before and after cycle    Continue Nephronex vitamin and Expedite to promote wound healing.     PO + TF to meet >/=100% of estimated needs.      ASSESSED NUTRITION NEEDS  Dosing Weight: 64 kg, based on adjusted wt  Estimated Energy Needs: 5582-0000 kcals/day (25 - 30 kcals/kg)  Justification: Overweight  Estimated Protein Needs: 75-95 grams protein/day (1.2 - 1.5 grams of pro/kg)  Justification: Dialysis and Wound healing  Estimated Fluid Needs: 7247-8636 mL/day (1 mL/kcal)  Justification: Maintenance    Jaymie Palmer, MS, RD, LD  Pager: [phone]  Available on [employer]

## 2025-03-12 NOTE — PROGRESS NOTES
[facility]   Pulmonary Progress Note  Supriya Herr MRN: [id number]  1950  Date of Admission:3/9/2025  Date of Service: 03/12/2025  ___________________________________  Assessment & Plan  Supriya Herr is a 75 year old female with PMHx most significant for CHF, anemia, CKD, hypertension, obesity hypoventilation, JONE, end-stage kidney disease on dialysis Monday Wednesday Friday that presented with acute on chronic dyspnea and diaphoresis.     Acute on chronic hypoxic respiratory failure  Moderate obstructive sleep apnea (AHI 19, APAP 5-20)  *RSV, COVID, flu: Negative  *cxr: Bilateral opacities consistent with pulmonary edema     Unfortunately, as seen between her multiple hospitalizations recently there is a very fine line in which Supriya is able to stay out of the hospital on a consistent basis.  Unfortunately this includes diligent management of her estimated dry weight with her dialysis along with doing everything we can to prevent intermittent increases in right-sided pressures.  Unfortunately when she does not wear her CPAP at night based on her sleep study in 2022 she will intermittently drop her oxygen to dangerous levels below 88% which will cause transient increases in right-sided pressures on her heart.  Taken together this will cause a vicious cycle of worsening of her left-sided output which then causes increased pulmonary edema and the cycle continues.  While being compliant with CPAP may not alleviate all these issues or prevent future hospitalizations, it is an absolute necessary for step.     VBG 3/12/2025 7.39/53, c/w mild chronic hypercapnia... however, she has not consistently had CPAP at night yet and this may improve overtime as her sleep apnea is finally treated on a consistent bases. This can be followed up as an outpatient.  If having issues with hypercapnia again, we can reassess.                   -Agree with attempting lower dry weight               -Wore CPAP all  night 3/11/25!               -continue to encourage CPAP   -follow up with sleep provider to assess need for BiPAP as outpatient    I have personally reviewed the daily labs, imaging studies, cultures and discussed the case with referring physician and consulting physicians.     I spent 50 dedicated to her care so far today 03/12/2025 excluding procedures, including review of medical records, review of imaging (results & images), time with patient and time in documentation.    Kurt Lerner MD  Pulmonary & Critical Care   [facility]  Securely message with the Vocera Web Console (learn more here)    Interval History   Nursing notes reviewed.  Wore CPAP all night last night, EPAP 10  Having for dialysis today  Down to 2 L nasal cannula  Still a little sleepy, feel maybe a little better, but its her HD day and she usually feels lousy those days so tough to tell if CPAP helped  Review of Systems   ROS:  6 point ROS including Respiratory, CV, GI and , other than that noted in the HPI, is negative    Physical Exam Temp:  [98  F (36.7  C)-98.5  F (36.9  C)] 98.2  F (36.8  C)  Pulse:  [68-72] 71  Resp:  [18-22] 20  BP: (112-153)/(44-56) 153/56  FiO2 (%):  [30 %] 30 %  SpO2:  [94 %-99 %] 97 %  I/O last 3 completed shifts:  In: 1274 [P.O.:740; NG/GT:270]  Out: -   Wt Readings from Last 1 Encounters:   03/12/25 93.6 kg (206 lb 5.6 oz)    Body mass index is 34.34 kg/m . FiO2 (%): 30 %, Resp: 20    Exam:  Constitutional: awake, alert, cooperative, no apparent distress, and appears stated age  HEENT: Anicteric sclera, EOMI, MMM  Cardiovascular: RRR, no mrg  Respiratory: decreased throughout, no wheezes  GI: normal bowel sounds, non-distended, non-tender  Skin: no rashes and no jaundice  Musculoskeletal: bl edema, wwp  Neurologic: AAOx3, no new focal deficits  Data   Labs (all laboratory studies reviewed by me):   Hemoglobin 6.8  VBG 7.26/68     Imaging (all imaging studies reviewed by  me):  Chest x-ray: Consistent with bilateral pulmonary edema     Procedures:   None  Medications   Current Facility-Administered Medications   Medication Dose Route Frequency Provider Last Rate Last Admin    - MEDICATION INSTRUCTIONS for Dialysis Patients -   Does not apply See Admin Instructions Cyril Ward MD        amiodarone (PACERONE) tablet 200 mg  200 mg Oral or Feeding Tube BID Celine Damon APRN CNP   200 mg at 03/11/25 2139    amLODIPine (NORVASC) tablet 10 mg  10 mg Oral or Feeding Tube Once per day on Sunday Tuesday Thursday Saturday Celine Damon APRN CNP   10 mg at 03/11/25 0938    apixaban ANTICOAGULANT (ELIQUIS) tablet 5 mg  5 mg Oral or Feeding Tube BID Celine Damon APRN CNP   5 mg at 03/11/25 2139    atorvastatin (LIPITOR) tablet 20 mg  20 mg Oral or Feeding Tube QPM Celine Damon APRN CNP   20 mg at 03/11/25 2139    B and C vitamin Complex with folic acid (NEPHRONEX) liquid 5 mL  5 mL Per Feeding Tube Daily Cyril Ward MD   5 mL at 03/11/25 1426    calcitRIOL (ROCALTROL) capsule 0.5 mcg  0.5 mcg Oral or Feeding Tube Once per day on Monday Wednesday Friday Ceilne Damon APRN CNP   0.5 mcg at 03/10/25 0952    cetirizine (zyrTEC) tablet 10 mg  10 mg Oral or Feeding Tube At Bedtime Celine Damon APRN CNP   10 mg at 03/11/25 2139    furosemide (LASIX) tablet 80 mg  80 mg Oral or Feeding Tube Daily Celine Damon APRN CNP   80 mg at 03/11/25 0937    hydrALAZINE (APRESOLINE) tablet 75 mg  75 mg Oral Q8H Celine Damon APRN CNP   75 mg at 03/12/25 0611    insulin aspart (NovoLOG) injection (RAPID ACTING)  1-7 Units Subcutaneous TID AC Celine Damon APRN CNP        insulin aspart (NovoLOG) injection (RAPID ACTING)  1-5 Units Subcutaneous At Bedtime Celine Damon APRN CNP   1 Units at 03/11/25 2236    insulin glargine (LANTUS PEN) injection 10 Units  10 Units Subcutaneous BID Cyril Ward MD   10 Units at 03/11/25 2236     midodrine (PROAMATINE) tablet 5 mg  5 mg Oral or Feeding Tube Once per day on Monday Wednesday Friday Celine Damon APRN CNP   5 mg at 03/10/25 0946    pantoprazole (PROTONIX) 2 mg/mL suspension 40 mg  40 mg Per Feeding Tube [facility] Celine Damon APRN CNP   40 mg at 03/12/25 0636    sertraline (ZOLOFT) tablet 25 mg  25 mg Oral Daily Celine Damon APRN CNP   25 mg at 03/11/25 0938    sertraline (ZOLOFT) tablet 50 mg  50 mg Oral Daily Celine Damon APRN CNP   50 mg at 03/11/25 0938    sodium chloride (PF) 0.9% PF flush 3 mL  3 mL Intracatheter Q8H Celine Damon APRN CNP   3 mL at 03/12/25 0129    sodium chloride (PF) 0.9% PF flush 3 mL  3 mL Intracatheter Q8H Kurt Escobar NP   3 mL at 03/11/25 0512

## 2025-03-12 NOTE — PROGRESS NOTES
[facility]  Hospitalist Progress Note   03/12/2025          Assessment and Plan:       Supriya Herr is a 75 year old female with history of ESRD on hemodialysis, heart failure with preserved EF, COPD, JONE, chronic anemia, hypertension, hyperlipidemia, poor mobility due to left AKA, obesity, anxiety, depression, former nicotine use admitted on 3/9/2025 for shortness of breath.    *Hospitalized at [facility] from 3/2 - 3/6/2025 with shortness of breath likely multifactorial.  Volume status was difficult to determine, underwent right heart cath on 3/5 showed mildly elevated right atrial and PCWP readings.  Was treated for acute respiratory failure likely from heart failure exacerbation, atrial fibrillation, end-stage renal disease and discharged to care facility with supplemental nocturnal oxygen.  *Prolonged hospitalized at [facility] from 1/8 to 2/26/2025 for acute hypoxic respiratory failure multifactorial, was intubated ( 1/9-1/18), reintubated (1/20 - 1/25).  Then was treated for shock with pressors, influenza A pneumonia, hospital-acquired pneumonia with intravenous antibiotics, steroids, antivirals.  Then also noted to have heart failure exacerbation, recurrent A-fib with RVR, subsequent bradycardia.  Then also noted to have encephalopathy likely from infectious, metabolic etiology and delirium.  During that hospitalization was noted to have dysphagia, pharyngeal edema, was evaluated by ENT on 2/16.  No findings to explain dysphagia.  G-tube was placed by IR on 2/17 and was on nocturnal tube feeds.  Discharged to TCU for rehabilitation.      Acute hypoxic respiratory failure multifactorial -likely from heart failure exacerbation, ESRD on hemodialysis, pulmonary hypertension, JONE not compliant with CPAP, physical deconditioning, recent pneumonia, anxiety related, anemia.  On 3/9, the patient reported shortness of breath at the TCU staff, felt nauseated and ultimately was found to be hypoxic in the low 80s  prompting transfer to ER.  Weights have been extremely variable based on documentation, EDW is predicted to be approximately 92.5 kg.  O2 sats are variable depending on anxiety/underlying issues.  *CXR 3/9/2025 showing bilateral interstitial and alveolar opacities, small pleural effusions, mild cardiomegaly, findings suggestive of pulmonary edema and volume overload.  --[place] [facility] underwent [place], intermittent BiPAP support, decreased tube feeding, free water flushes, was on fluid restriction with which patient having improvement in shortness of breath.  IMC status discontinued 3/11.    -- Patient was on BiPAP on 3/11 overnight -on 3/12 admits to improvement in shortness of breath.    -- At length discussed with patient, her daughter Caroline over the telephone regarding ongoing  medical issues. Did discuss my concerns for tenuous clinical situation given -multiple medical issues, risk for readmission discussed with patient and family.  Patient and family opting for restorative care.  -- Will continue CPAP support at night, with nap time.  Appreciate pulmonology input.  ---Will continue dialysis MWF schedule.  Appreciate nephrology input.  --Continue oral Lasix daily, cardiac medications.  Appreciate cardiology chart review and input.  -- Decrease tube feeding to 65 mL/h for 10 hours.  Decrease free water flushes to 15 mL before and after medication, free water to 30 mL before and after tube feeds.  Can further decrease tube feeds if oral intake improves.  --Address medical issues as discussed  -- 1500 mL fluid restriction.  Strict intake output monitoring.  Daily weights.  -- Aggressive incentive spirometry.  Encourage ambulation as able to tolerate.  --Close follow-up imaging of chest in few weeks for resolution.  -- Feeding with aspiration precautions.     Goals of Care  -- Have discussed with patient, daughter goals of care during this hospitalization.  Patient would like to continue restorative  care and live as long as able to.  Discussed with patient's daughter continue no CPR, prearrest intubation okay.  Elected for restorative care.  Patient and family declined palliative evaluation.  Patient at high risk for decompensation/readmission.    End-stage renal disease on HD MWF  History of left arm fistula  Of note - has not tolerated more aggressive fluid removal in the past with associated hypotension and A-fib with RVR. Cinacalcet stopped on previous admission.  Undergoing dialysis on 3/12.  Nephrology following, recommend to continue dialysis MWF.  Appreciate input.  May need to consider for once a week-? In future.     Acute exacerbation of heart failure with preserved EF.  Elevated troponin from volume overload, renal disease.  Type II MI.  Paroxysmal atrial fibrillation on chronic anticoagulation (Eliquis)   Essential hypertension.  Hyperlipidemia.  History of bradycardia  *TTE 1/21/2025 with LVEF of 60%.  No valvular abnormality   *RHC 3/5/25 showing moderately elevated right and left-sided filling pressure; elevated wedge pressure consistent with pulmonary hypertension  *Cardiology and EP followed on prior hospital stay (01/2025), required IV amiodarone at that time and transition to oral therapy.    At this time troponin 76 > 83.  proBNP 33, 144 (baseline 20s-40k).  --Cardiology reviewed chart on 3/11, recommended volume management through dialysis.  Appreciate input.  Dialysis as above  Continue PTA amiodarone 200 mg twice daily.  Continue PTA apixaban 5 mg twice daily.  closely monitor hemoglobin levels.  Continue PTA hydralazine 75 mg every 8 hours.  Continue PTA Lasix 80 mg oral daily.  Continue PTA Lipitor.  Telemetry monitoring.  Close cardiology follow-up as outpatient within 1 week on discharge.    Status post blood transfusion  Acute on chronic anemia likely dilutional, no active blood loss.  Anemia of chronic disease, end-stage renal disease  Admission hemoglobin 7.0, baseline  7.0-8.0  -Hemoglobin dropped to 6.8 on 3/10, received 1 unit PRBC.  Now hemoglobin in 8 range.  -EPO per nephrology.  Monitor hemoglobin levels closely. Will need to continue Eliquis despite anemia given ([id number] - VASc 7).    S/p PEG tube 2/17/2025  Gastroesophageal reflux disease  Vague dysphagia intermittent oral pharynx upper esophagus  *Esophagram 3/4-normal   - Continue pantoprazole   Continue oral diet as recommended by speech therapy.    Continue tube feeds nocturnal-decrease to 10 hours at nighttime.  Recommend calorie count at TCU to wean off tube feeds if able to.     Type II DM, controlled with hemoglobin A1c of 6.5 [likely low in the setting of anemia]  Diabetic neuropathy  PTA regimen includes: PTA lantus 15 units bid (but discharged on 3/6 on 10 units BID)  Noted hyperglycemia.  Increase insulin Lantus to 15 units twice daily.  Medium intensity sliding scale insulin, monitor blood sugars and optimize regimen.  Hypoglycemia protocol in place.     Acute on chronic anxiety.  Depression  Continue PTA Zoloft.  Can consider behavioral therapy as outpatient.    Physical deconditioning from medical illness, senile frailty.  S/p left above-knee amputation, traumatic  Patient from TCU, will discharge back to TCU.  Encourage ambulation out of bed, per nursing report using the left.  Fall precautions     Sacrococcygeal Pressure injury, Stage 3, present on admission  Wound care team followed.  Pressure injury prevention.     Obesity with a BMI of 34.  Increase all-cause mortality and morbidity.  Consider lifestyle modification with diet and exercise as able to    Clinically Significant Risk Factors     # Hypertension: Noted on problem list    # Chronic heart failure with preserved ejection fraction: heart failure noted on problem list and last echo with EF >50%    # Acute Hypercapnic Respiratory Failure: based on venous blood gas results.  Continue supplemental oxygen and ventilatory support as indicated.    #  "DMII: A1C = 6.5 % (Ref range: <5.7 %) within past 6 months   # Obesity: Estimated body mass index is 34.34 kg/m  as calculated from the following:    Height as of this encounter: 1.651 m (5' 5\").    Weight as of this encounter: 93.6 kg (206 lb 5.6 oz)., PRESENT ON ADMISSION       # Financial/Environmental Concerns: none       Orders Placed This Encounter      Combination Diet Regular Diet; Thin Liquids (level 0) (Upright position during and 60 minutes after oral intake. Slow pace with rest breaks as needed. Choose softer foods. Upper denture in place.)      DVT Prophylaxis: SCDs, on DOAC.  Code Status: No CPR- Pre-arrest intubation OK  Disposition: Expected discharge in 2 days pending clinical improvement.  Discontinue IMC status.    Medically Ready for Discharge: Anticipated in 2-4 Days     Discussed with patient, bedside RN, care coordinator, cardiologist, nutrition team.  Updated patient's daughter over the telephone 3/12.  >51 minutes spent by me on the date of service doing chart review, history, exam, documentation & further activities per the note.      Cyril aWrd MD        Interval History:        Patient lying in bed -Undergoing dialysis.  Patient denies any chest pain or palpitations.   Denies any shortness of breath on rest.  Was on CPAP overnight.  Reports feeling better this morning -on 2 L nasal oxygen with O2 sats of 99% .  Denies any headache or dizziness.  Denies nausea or vomiting.    Denies any blood in the stools or blood in the urine.    Per report tolerating oral diet.  Per nursing report up with assist of 2, lift.  Telemetry sinus rhythm.         Physical Exam:        Physical Exam   Temp:  [98  F (36.7  C)-98.5  F (36.9  C)] 98.2  F (36.8  C)  Pulse:  [65-72] 65  Resp:  [18-22] 18  BP: (112-153)/(42-56) 140/48  FiO2 (%):  [30 %] 30 %  SpO2:  [94 %-100 %] 98 %    Intake/Output Summary (Last 24 hours) at 3/10/2025 1805  Last data filed at 3/10/2025 1530  Gross per 24 hour   Intake 410 ml "   Output 3500 ml   Net -3090 ml       Admission Weight: 99.8 kg (220 lb)  Current Weight: 102 kg (224 lb 14.4 oz)    PHYSICAL EXAM  GENERAL: Patient is in no distress. Alert and oriented.  Frail-appearing  HEART: Regular rate and rhythm. S1S2.  Systolic murmur present.  LUNGS: Bilateral decreased breath sounds, no wheezing  Respirations unlabored  ABDOMEN: Soft, no abdominal tenderness, bowel sounds heard.  PEG tube present.  NEURO: Moving all extremities.  EXTREMITIES: Pedal edema present.  SKIN: Warm, dry.  PSYCHIATRY Cooperative       Medications:        Current Facility-Administered Medications   Medication Dose Route Frequency Provider Last Rate Last Admin    - MEDICATION INSTRUCTIONS for Dialysis Patients -   Does not apply See Admin Instructions Cyril Ward MD        amiodarone (PACERONE) tablet 200 mg  200 mg Oral or Feeding Tube BID Celine Damon APRN CNP   200 mg at 03/11/25 2139    amLODIPine (NORVASC) tablet 10 mg  10 mg Oral or Feeding Tube Once per day on Sunday Tuesday Thursday Saturday Celine Damon APRN CNP   10 mg at 03/11/25 0938    apixaban ANTICOAGULANT (ELIQUIS) tablet 5 mg  5 mg Oral or Feeding Tube BID Celine Damon APRN CNP   5 mg at 03/12/25 0858    atorvastatin (LIPITOR) tablet 20 mg  20 mg Oral or Feeding Tube QPM Celine Damon APRN CNP   20 mg at 03/11/25 2139    B and C vitamin Complex with folic acid (NEPHRONEX) liquid 5 mL  5 mL Per Feeding Tube Daily Cyril Ward MD   5 mL at 03/11/25 1426    calcitRIOL (ROCALTROL) capsule 0.5 mcg  0.5 mcg Oral or Feeding Tube Once per day on Monday Wednesday Friday Celine Damon APRN CNP   0.5 mcg at 03/10/25 0952    cetirizine (zyrTEC) tablet 10 mg  10 mg Oral or Feeding Tube At Bedtime Celine Damon APRN CNP   10 mg at 03/11/25 2139    furosemide (LASIX) tablet 80 mg  80 mg Oral or Feeding Tube Daily Celine Damon APRN CNP   80 mg at 03/11/25 0937    heparin (porcine) injection  500 Units  Hemodialysis Machine OR IV Push Once in dialysis/CRRT Tian Lim MD        hydrALAZINE (APRESOLINE) tablet 75 mg  75 mg Oral Q8H Celine Damon APRN CNP   75 mg at 03/12/25 0611    insulin aspart (NovoLOG) injection (RAPID ACTING)  1-7 Units Subcutaneous TID AC Celine Damon APRN CNP   2 Units at 03/12/25 0900    insulin aspart (NovoLOG) injection (RAPID ACTING)  1-5 Units Subcutaneous At Bedtime Celine Damon APRN CNP   1 Units at 03/11/25 2236    insulin glargine (LANTUS PEN) injection 10 Units  10 Units Subcutaneous BID Cyril Ward MD   10 Units at 03/12/25 0900    midodrine (PROAMATINE) tablet 5 mg  5 mg Oral or Feeding Tube Once per day on Monday Wednesday Friday Celine Damon APRN CNP   5 mg at 03/12/25 0858    pantoprazole (PROTONIX) 2 mg/mL suspension 40 mg  40 mg Per Feeding Tube QAM AC Celine Damon APRN CNP   40 mg at 03/12/25 0636    sertraline (ZOLOFT) tablet 25 mg  25 mg Oral Daily Celine Damon APRN CNP   25 mg at 03/12/25 0857    sertraline (ZOLOFT) tablet 50 mg  50 mg Oral Daily Celine Damon APRN CNP   50 mg at 03/12/25 0858    sodium chloride (PF) 0.9% PF flush 3 mL  3 mL Intracatheter Q8H Celine Damon APRN CNP   3 mL at 03/12/25 0129    sodium chloride (PF) 0.9% PF flush 3 mL  3 mL Intracatheter Q8H Kurt Escobar NP   3 mL at 03/11/25 0512    sodium chloride 0.9% BOLUS 200 mL  200 mL Hemodialysis Machine Once Tian Lim MD        sodium chloride 0.9% BOLUS 250 mL  250 mL Intravenous Once in dialysis/CRRT Tian Lim MD        wound support modular (EXPEDITE) bottle 60 mL  60 mL Oral Daily Cyril Ward MD         Current Facility-Administered Medications   Medication Dose Route Frequency Provider Last Rate Last Admin    acetaminophen (TYLENOL) tablet 650 mg  650 mg Oral Q4H PRN Celine Damon APRN CNP   650 mg at 03/11/25 0937    albumin human 25 % injection 50 mL  50 mL Intravenous Q1H PRN Lim,  Tian Herring MD        albumin human 5 % injection  mL   mL Intravenous Q1H PRN Tian Lim MD        albuterol (PROVENTIL HFA/VENTOLIN HFA) inhaler  2 puff Inhalation Q6H PRN Celine Damon APRN CNP        calcium carbonate (TUMS) chewable tablet 1,000 mg  1,000 mg Oral 4x Daily PRN Celine Damon APRN CNP   1,000 mg at 03/11/25 0937    carboxymethylcellulose PF (REFRESH PLUS) 0.5 % ophthalmic solution 1 drop  1 drop Both Eyes Q1H PRN Kurt Escobar NP        dextrose 10% infusion   Intravenous Continuous PRN Cyril Ward MD        glucose gel 15-30 g  15-30 g Oral Q15 Min PRN Cyril Ward MD        Or    dextrose 50 % injection 25-50 mL  25-50 mL Intravenous Q15 Min PRN Cyril Ward MD        Or    glucagon injection 1 mg  1 mg Subcutaneous Q15 Min PRN Cyril Ward MD        ipratropium - albuterol 0.5 mg/2.5 mg/3 mL (DUONEB) neb solution 3 mL  3 mL Nebulization Q4H PRN Celine Damon APRN CNP        lidocaine (LMX4) cream   Topical Q1H PRN Kurt Escobar NP        lidocaine 1 % 0.1-1 mL  0.1-1 mL Other Q1H PRN Kurt Escobar NP        lidocaine 1 % 0.5 mL  0.5 mL Intradermal Once PRN Tian Lim MD        lidocaine 1 % 0.5 mL  0.5 mL Intradermal Once PRN Tian Lim MD        No lozenges or gum should be given while patient on BIPAP/AVAPS/AVAPS AE   Does not apply Continuous PRN Kurt Escobar NP        olopatadine (PATANOL) 0.1 % ophthalmic solution 1 drop  1 drop Both Eyes BID PRN Celine Damon APRN CNP        ondansetron (ZOFRAN ODT) ODT tab 4 mg  4 mg Oral Q6H PRN Celine Damon APRN CNP        Or    ondansetron (ZOFRAN) injection 4 mg  4 mg Intravenous Q6H PRN Celine Damon APRN CNP        Patient is already receiving anticoagulation with heparin, enoxaparin (LOVENOX), warfarin (COUMADIN)  or other anticoagulant medication   Does not apply Continuous PRN Celine Damon  PENELOPE Ramos CNP        Patient may continue current oral medications   Does not apply Continuous PRN Kurt Escobar NP        senna-docusate (SENOKOT-S/PERICOLACE) 8.6-50 MG per tablet 1 tablet  1 tablet Oral BID PRN Celine Damon APRN CNP        Or    senna-docusate (SENOKOT-S/PERICOLACE) 8.6-50 MG per tablet 2 tablet  2 tablet Oral BID PRN Celine Damon APRN CNP        sevelamer carbonate (RENVELA) tablet 800 mg  800 mg Oral BID PRN Celine Damon APRN CNP        sodium chloride (PF) 0.9% PF flush 3 mL  3 mL Intracatheter q1 min rodolfon Celine Damon APRN CNP        sodium chloride (PF) 0.9% PF flush 3 mL  3 mL Intracatheter q1 min prn Kurt Escobar NP        sodium chloride 0.9% BOLUS 100-150 mL  100-150 mL Intravenous Q15 Min PRTian Terrell MD        Stop Heparin 60 minutes before end of treatment   Does not apply Continuous PRN Tian Lim MD                Data:      All new lab and imaging data was reviewed.

## 2025-03-13 ENCOUNTER — APPOINTMENT (OUTPATIENT)
Dept: SPEECH THERAPY | Facility: CLINIC | Age: 75
DRG: 280 | End: 2025-03-13
Payer: COMMERCIAL

## 2025-03-13 VITALS
RESPIRATION RATE: 18 BRPM | DIASTOLIC BLOOD PRESSURE: 44 MMHG | WEIGHT: 206.35 LBS | SYSTOLIC BLOOD PRESSURE: 129 MMHG | HEART RATE: 67 BPM | OXYGEN SATURATION: 95 % | HEIGHT: 65 IN | BODY MASS INDEX: 34.38 KG/M2 | TEMPERATURE: 98.1 F

## 2025-03-13 LAB
GLUCOSE BLDC GLUCOMTR-MCNC: 113 MG/DL (ref 70–99)
GLUCOSE BLDC GLUCOMTR-MCNC: 146 MG/DL (ref 70–99)
GLUCOSE BLDC GLUCOMTR-MCNC: 167 MG/DL (ref 70–99)
GLUCOSE BLDC GLUCOMTR-MCNC: 191 MG/DL (ref 70–99)
HGB BLD-MCNC: 8.7 G/DL (ref 11.7–15.7)
NT-PROBNP SERPL-MCNC: ABNORMAL PG/ML (ref 0–1800)
TROPONIN T SERPL HS-MCNC: 84 NG/L

## 2025-03-13 PROCEDURE — 99232 SBSQ HOSP IP/OBS MODERATE 35: CPT | Performed by: INTERNAL MEDICINE

## 2025-03-13 PROCEDURE — 250N000011 HC RX IP 250 OP 636: Performed by: INTERNAL MEDICINE

## 2025-03-13 PROCEDURE — 999N000157 HC STATISTIC RCP TIME EA 10 MIN

## 2025-03-13 PROCEDURE — 99233 SBSQ HOSP IP/OBS HIGH 50: CPT | Performed by: STUDENT IN AN ORGANIZED HEALTH CARE EDUCATION/TRAINING PROGRAM

## 2025-03-13 PROCEDURE — 83880 ASSAY OF NATRIURETIC PEPTIDE: CPT | Performed by: HOSPITALIST

## 2025-03-13 PROCEDURE — 84484 ASSAY OF TROPONIN QUANT: CPT | Performed by: HOSPITALIST

## 2025-03-13 PROCEDURE — 36415 COLL VENOUS BLD VENIPUNCTURE: CPT | Performed by: HOSPITALIST

## 2025-03-13 PROCEDURE — 120N000001 HC R&B MED SURG/OB

## 2025-03-13 PROCEDURE — 94660 CPAP INITIATION&MGMT: CPT

## 2025-03-13 PROCEDURE — 250N000013 HC RX MED GY IP 250 OP 250 PS 637: Performed by: NURSE PRACTITIONER

## 2025-03-13 PROCEDURE — 85018 HEMOGLOBIN: CPT | Performed by: HOSPITALIST

## 2025-03-13 PROCEDURE — 92526 ORAL FUNCTION THERAPY: CPT | Mod: GN | Performed by: SPEECH-LANGUAGE PATHOLOGIST

## 2025-03-13 PROCEDURE — 99233 SBSQ HOSP IP/OBS HIGH 50: CPT | Performed by: HOSPITALIST

## 2025-03-13 PROCEDURE — 250N000013 HC RX MED GY IP 250 OP 250 PS 637: Performed by: HOSPITALIST

## 2025-03-13 RX ORDER — LORAZEPAM 2 MG/ML
0.5 INJECTION INTRAMUSCULAR ONCE
Status: COMPLETED | OUTPATIENT
Start: 2025-03-13 | End: 2025-03-13

## 2025-03-13 RX ORDER — SERTRALINE HYDROCHLORIDE 25 MG/1
25 TABLET, FILM COATED ORAL DAILY
Status: DISCONTINUED | OUTPATIENT
Start: 2025-03-14 | End: 2025-03-17 | Stop reason: HOSPADM

## 2025-03-13 RX ORDER — SEVELAMER CARBONATE 800 MG/1
800 TABLET, FILM COATED ORAL 2 TIMES DAILY PRN
Status: DISCONTINUED | OUTPATIENT
Start: 2025-03-13 | End: 2025-03-17 | Stop reason: HOSPADM

## 2025-03-13 RX ADMIN — HYDRALAZINE HYDROCHLORIDE 75 MG: 50 TABLET ORAL at 05:18

## 2025-03-13 RX ADMIN — FUROSEMIDE 80 MG: 40 TABLET ORAL at 10:24

## 2025-03-13 RX ADMIN — HYDRALAZINE HYDROCHLORIDE 75 MG: 50 TABLET ORAL at 15:31

## 2025-03-13 RX ADMIN — AMLODIPINE BESYLATE 10 MG: 10 TABLET ORAL at 10:24

## 2025-03-13 RX ADMIN — HYDRALAZINE HYDROCHLORIDE 75 MG: 50 TABLET ORAL at 20:49

## 2025-03-13 RX ADMIN — Medication 40 MG: at 10:25

## 2025-03-13 RX ADMIN — APIXABAN 5 MG: 5 TABLET, FILM COATED ORAL at 20:50

## 2025-03-13 RX ADMIN — ATORVASTATIN CALCIUM 20 MG: 20 TABLET, FILM COATED ORAL at 20:49

## 2025-03-13 RX ADMIN — Medication 5 ML: at 10:25

## 2025-03-13 RX ADMIN — APIXABAN 5 MG: 5 TABLET, FILM COATED ORAL at 10:24

## 2025-03-13 RX ADMIN — CETIRIZINE HYDROCHLORIDE 10 MG: 10 TABLET, FILM COATED ORAL at 20:52

## 2025-03-13 RX ADMIN — Medication 60 ML: at 10:25

## 2025-03-13 RX ADMIN — AMIODARONE HYDROCHLORIDE 200 MG: 200 TABLET ORAL at 10:24

## 2025-03-13 RX ADMIN — AMIODARONE HYDROCHLORIDE 200 MG: 200 TABLET ORAL at 20:50

## 2025-03-13 RX ADMIN — SERTRALINE HYDROCHLORIDE 50 MG: 50 TABLET ORAL at 10:25

## 2025-03-13 RX ADMIN — LORAZEPAM 0.5 MG: 2 INJECTION INTRAMUSCULAR; INTRAVENOUS at 04:25

## 2025-03-13 RX ADMIN — MELATONIN TAB 3 MG 3 MG: 3 TAB at 22:15

## 2025-03-13 RX ADMIN — SERTRALINE HYDROCHLORIDE 25 MG: 25 TABLET ORAL at 10:26

## 2025-03-13 ASSESSMENT — ACTIVITIES OF DAILY LIVING (ADL)
ADLS_ACUITY_SCORE: 83
ADLS_ACUITY_SCORE: 85
ADLS_ACUITY_SCORE: 83
ADLS_ACUITY_SCORE: 81
ADLS_ACUITY_SCORE: 79
ADLS_ACUITY_SCORE: 81
ADLS_ACUITY_SCORE: 79
ADLS_ACUITY_SCORE: 79
ADLS_ACUITY_SCORE: 83
ADLS_ACUITY_SCORE: 85
ADLS_ACUITY_SCORE: 79
ADLS_ACUITY_SCORE: 85
ADLS_ACUITY_SCORE: 85
ADLS_ACUITY_SCORE: 81
ADLS_ACUITY_SCORE: 81
ADLS_ACUITY_SCORE: 79
ADLS_ACUITY_SCORE: 79
ADLS_ACUITY_SCORE: 81
ADLS_ACUITY_SCORE: 85
ADLS_ACUITY_SCORE: 81
ADLS_ACUITY_SCORE: 79

## 2025-03-13 NOTE — PROGRESS NOTES
Speech Language Therapy Discharge Summary    Reason for therapy discharge:    All goals and outcomes met, no further needs identified.    Progress towards therapy goal(s). See goals on Care Plan in [facility] electronic health record for goal details.  Goals met    Therapy recommendation(s):    No further SLP swallow Tx   No aspiration has been observed during evaluations since 1/2025 - see recent bedside evaluations, VFSS, and Esophagram results:    3/2/25 Esophagram:  ESOPHAGUS: Normal. Normal peristalsis. No strictures, masses, or inflammatory changes. No gastroesophageal reflux in the recumbent position. IMPRESSION: 1.  Normal esophagram.    1/31/25 VFSS: IMPRESSION: No penetration or aspiration with any consistency - Pt presents with minimal-no oral-pharyngeal dysphagia based on lack of lower dentition.  Oral-pharyngeal function is WFL; however, pt continues to complain about intermittent feeling of food sticking/globus sensation resulting in poor po intake.  VFSS was completed during pt's previous hospital stay with no significant pharyngeal residue and no penetration/aspration.  Esophageal dysphagia symptoms were reported at that time.  From an oral-pharyngeal perspective ok for regular diet textures, picking soft food from diet, and thin liquids with reminders to use GERD precautions.      Limited po intake despite swallow found to be WFL resulted in PEG placement during recent hospital stay.  Anxiety appears to be an ongoing issue with pt feeling that she can not swallow.  No further SLP swallow eval and Tx indicated.     03/13/25 1421   SLP Discharge Planning   SLP Plan Discontinue swallow Tx   SLP Discharge Recommendation Transitional Care Facility   SLP Rationale for DC Rec No further SLP swallow Tx needs. Extensive education has been provided.   SLP Brief overview of current status  Swallow function is at baseline, anxiety is impacting intake; Rec: regular diet (pick soft food as needed) and continue thin  liquids (IDDSI 0) given swallow strategies (fully upright position, small sips/bites, slow pace with rest breaks as needed) and reflux precautions (remain upright 60 minutes after oral intake, elevate HOB 30 degrees, do not lay down 2-3 hours after meals, smaller meals at a sitting). Crush pills if possible or trial small pills one at a time wtih puree followed by sips of water.

## 2025-03-13 NOTE — PROGRESS NOTES
[facility]  Hospitalist Progress Note   03/13/2025          Assessment and Plan:       Supriya Herr is a 75 year old female with history of ESRD on hemodialysis, heart failure with preserved EF, COPD, JONE, chronic anemia, hypertension, hyperlipidemia, poor mobility due to left AKA, obesity, anxiety, depression, former nicotine use admitted on 3/9/2025 for shortness of breath.    *Hospitalized at [facility] from 3/2 - 3/6/2025 with shortness of breath likely multifactorial.  Volume status was difficult to determine, underwent right heart cath on 3/5 showed mildly elevated right atrial and PCWP readings.  Was treated for acute respiratory failure likely from heart failure exacerbation, atrial fibrillation, end-stage renal disease and discharged to care facility with supplemental nocturnal oxygen.  *Prolonged hospitalized at [facility] from 1/8 to 2/26/2025 for acute hypoxic respiratory failure multifactorial, was intubated ( 1/9-1/18), reintubated (1/20 - 1/25).  Then was treated for shock with pressors, influenza A pneumonia, hospital-acquired pneumonia with intravenous antibiotics, steroids, antivirals.  Then also noted to have heart failure exacerbation, recurrent A-fib with RVR, subsequent bradycardia.  Then also noted to have encephalopathy likely from infectious, metabolic etiology and delirium.  During that hospitalization was noted to have dysphagia, pharyngeal edema, was evaluated by ENT on 2/16.  No findings to explain dysphagia.  G-tube was placed by IR on 2/17 and was on nocturnal tube feeds.  Discharged to TCU for rehabilitation.      Acute hypoxic respiratory failure multifactorial -likely from heart failure exacerbation, ESRD on hemodialysis, pulmonary hypertension, JONE not compliant with CPAP, physical deconditioning, recent pneumonia, anxiety related, anemia.  On 3/9, the patient reported shortness of breath at the TCU staff, felt nauseated and ultimately was found to be hypoxic in the low 80s  prompting transfer to ER.  Weights have been extremely variable based on documentation, EDW is predicted to be approximately 92.5 kg.  O2 sats are variable depending on anxiety/underlying issues.  *CXR 3/9/2025 showing bilateral interstitial and alveolar opacities, small pleural effusions, mild cardiomegaly, findings suggestive of pulmonary edema and volume overload.  --[facility], underwent dialysis, intermittent BiPAP support, decreased tube feeding, free water flushes, was on fluid restriction with which patient having improvement in shortness of breath.  IMC status discontinued 3/11.    -- Patient was on BiPAP on 3/11 overnight.  -- 3/12 some improvement in shortness of breath with dialysis, was on CPAP for few hours overnight.  3/13 -overnight per report patient restless, got Ativan oral dose early this morning, drowsy and barely arousable.  Was on CPAP for couple of hours.  --Patient and family opting for restorative care.  --Will continue CPAP support with oxygen at night, with nap time.  Appreciate pulmonology input.  -- Supplemental nasal oxygen during day as needed.   ---Will continue dialysis MWF schedule.  Appreciate nephrology input.  --Continue oral Lasix daily, cardiac medications.  Appreciate cardiology chart review and input.  -- Decrease tube feeding to 65 mL/h for 10 hours.  Decrease free water flushes to 15 mL before and after medication, free water to 30 mL before and after tube feeds.  Can further decrease tube feeds if oral intake improves.  --Avoid benzodiazepines.  --Address medical issues as discussed  -- 1500 mL fluid restriction.  Strict intake output monitoring.  Daily weights.  -- Aggressive incentive spirometry.  Encourage ambulation as able to tolerate.  -- Feeding with aspiration precautions.  --Close follow-up imaging of chest in few weeks for resolution.     Goals of Care  -- Have discussed with patient, daughter goals of care during this hospitalization.  Patient  would like to continue restorative care and live as long as able to.  Revisited goals of care discussion with patient's daughter on 3/12 -  continue no CPR, prearrest intubation okay.  Elected for restorative care.  Patient and family declined palliative evaluation.  Patient at high risk for decompensation/readmission.    End-stage renal disease on HD MWF  History of left arm fistula  Of note - has not tolerated more aggressive fluid removal in the past with associated hypotension and A-fib with RVR. Cinacalcet stopped on previous admission.  Undergoing dialysis on 3/12.  Nephrology following, recommend to continue dialysis MWF.  Appreciate input.  May need to consider for once a week-? In future.   Await nephrology input on dosing of Lasix.    Acute exacerbation of heart failure with preserved EF.  Elevated troponin from volume overload, renal disease.  Type II MI.  Paroxysmal atrial fibrillation on chronic anticoagulation (Eliquis)   Essential hypertension.  Hyperlipidemia.  History of bradycardia  *TTE 1/21/2025 with LVEF of 60%.  No valvular abnormality   *RHC 3/5/25 showing moderately elevated right and left-sided filling pressure; elevated wedge pressure consistent with pulmonary hypertension  *Cardiology and EP followed on prior hospital stay (01/2025), required IV amiodarone at that time and transition to oral therapy.    At this time troponin 76 > 83.  proBNP 33, 144 (baseline 20s-40k).  --Cardiology reviewed chart on 3/11, recommended volume management through dialysis.  Appreciate input.  Dialysis as above  Continue PTA amiodarone 200 mg twice daily.  Continue PTA apixaban 5 mg twice daily.  closely monitor hemoglobin levels.  Continue PTA hydralazine 75 mg every 8 hours.  Continue PTA Lasix 80 mg oral daily.  Continue PTA Lipitor.  Telemetry monitoring.  Close cardiology follow-up as outpatient within 1 week on discharge.    Acute on chronic anxiety.  Depression.  Insomnia.  Continue PTA Zoloft.    Added  as needed Seroquel twice daily.  QTc within normal limits.  As needed melatonin as needed for sleep.  Would avoid benzodiazepines if able too.   Can consider behavioral therapy as outpatient.    Status post blood transfusion  Acute on chronic anemia likely dilutional, no active blood loss.  Anemia of chronic disease, end-stage renal disease  Admission hemoglobin 7.0, baseline 7.0-8.0  -Hemoglobin dropped to 6.8 on 3/10, received 1 unit PRBC.  Now hemoglobin in 8 range.  -EPO per nephrology.  Monitor hemoglobin levels closely. Will need to continue Eliquis despite anemia given ([id number] - VASc 7).    S/p PEG tube 2/17/2025  Gastroesophageal reflux disease  Vague dysphagia intermittent oral pharynx upper esophagus  *Esophagram 3/4-normal   - Continue pantoprazole   Continue oral diet as recommended by speech therapy.    Continue tube feeds nocturnal-decrease to 10 hours at nighttime.  Recommend calorie count at TCU to wean off tube feeds if able to.     Type II DM, controlled with hemoglobin A1c of 6.5 [likely low in the setting of anemia]  Diabetic neuropathy  PTA regimen includes: PTA lantus 15 units bid (but discharged on 3/6 on 10 units BID)  Noted hyperglycemia.  Increase insulin Lantus to 15 units twice daily.  Medium intensity sliding scale insulin, monitor blood sugars and optimize regimen.  Hypoglycemia protocol in place.     Physical deconditioning from medical illness, senile frailty.  S/p left above-knee amputation, traumatic  Patient from TCU, will discharge back to TCU.  Encourage ambulation out of bed, per nursing report using the lift.  Fall precautions     Sacrococcygeal Pressure injury, Stage 3, present on admission  Wound care team followed.  Pressure injury prevention.     Obesity with a BMI of 34.  Increase all-cause mortality and morbidity.  Consider lifestyle modification with diet and exercise as able to    Clinically Significant Risk Factors     # Hypertension: Noted on problem list    #  "Chronic heart failure with preserved ejection fraction: heart failure noted on problem list and last echo with EF >50%    # Acute Hypercapnic Respiratory Failure: based on venous blood gas results.  Continue supplemental oxygen and ventilatory support as indicated.    # DMII: A1C = 6.5 % (Ref range: <5.7 %) within past 6 months   # Obesity: Estimated body mass index is 34.34 kg/m  as calculated from the following:    Height as of this encounter: 1.651 m (5' 5\").    Weight as of this encounter: 93.6 kg (206 lb 5.6 oz).        # Financial/Environmental Concerns: none       Orders Placed This Encounter      Combination Diet Regular Diet; Thin Liquids (level 0) (Upright position during and 60 minutes after oral intake. Slow pace with rest breaks as needed. Choose softer foods. Upper denture in place.)      DVT Prophylaxis: SCDs, on DOAC.  Code Status: No CPR- Pre-arrest intubation OK  Disposition: Expected discharge likely 1 to 2 days, pending improvement in clinical condition.    Medically Ready for Discharge: Anticipated Tomorrow     Discussed with patient, bedside RN, pulmonologist, care team  Updated patient's daughter over the telephone 3/12.  >51 minutes spent by me on the date of service doing chart review, history, exam, documentation & further activities per the note.      Cyril Ward MD        Interval History:        Patient lying in bed -drowsy, arousable and falls asleep.    Per report restless overnight, had CPAP on for couple of hours.  Early this morning at 340 patient had received one-time dose of Ativan 0.5 mg for restlessness and subsequently fell asleep, this morning drowsy.    Patient denies any chest pain or palpitations.   No shortness of breath at rest.  Per report was on CPAP for couple of hours, on supplemental nasal oxygen this morning 2 L.  Per report had not had anything to eat this morning.  No report of diarrhea or blood in the stools or blood in the urine.  Per report up with assist " of 2, Lift  Telemetry sinus rhythm.         Physical Exam:        Physical Exam   Temp:  [98.2  F (36.8  C)-98.5  F (36.9  C)] 98.2  F (36.8  C)  Pulse:  [61-68] 65  Resp:  [18] 18  BP: (110-144)/(38-73) 133/50  FiO2 (%):  [30 %-40 %] 30 %  SpO2:  [97 %-100 %] 99 %    Intake/Output Summary (Last 24 hours) at 3/10/2025 1805  Last data filed at 3/10/2025 1530  Gross per 24 hour   Intake 410 ml   Output 3500 ml   Net -3090 ml       Admission Weight: 99.8 kg (220 lb)  Current Weight: 102 kg (224 lb 14.4 oz)    PHYSICAL EXAM  GENERAL: Patient is in no distress.  Drowsy but arousable.  Frail-appearing  HEART: Regular rate and rhythm. S1S2.  Systolic murmur present.  LUNGS: Bilateral decreased breath sounds, no wheezing  Respirations unlabored  ABDOMEN: Soft, no abdominal tenderness, bowel sounds heard.  PEG tube present.  NEURO: Moving all extremities.  EXTREMITIES: Pedal edema present.  SKIN: Warm, dry.  PSYCHIATRY drowsy       Medications:        Current Facility-Administered Medications   Medication Dose Route Frequency Provider Last Rate Last Admin    - MEDICATION INSTRUCTIONS for Dialysis Patients -   Does not apply See Admin Instructions Cyril Ward MD        amiodarone (PACERONE) tablet 200 mg  200 mg Oral or Feeding Tube BID Celine Damon APRN CNP   200 mg at 03/13/25 1024    amLODIPine (NORVASC) tablet 10 mg  10 mg Oral or Feeding Tube Once per day on Sunday Tuesday Thursday Saturday Celine Damon APRN CNP   10 mg at 03/13/25 1024    apixaban ANTICOAGULANT (ELIQUIS) tablet 5 mg  5 mg Oral or Feeding Tube BID Celine Damon APRN CNP   5 mg at 03/13/25 1024    atorvastatin (LIPITOR) tablet 20 mg  20 mg Oral or Feeding Tube QPM Celine Damon APRN CNP   20 mg at 03/12/25 2119    B and C vitamin Complex with folic acid (NEPHRONEX) liquid 5 mL  5 mL Per Feeding Tube Daily Cyril Ward MD   5 mL at 03/13/25 1025    calcitRIOL (ROCALTROL) capsule 0.5 mcg  0.5 mcg Oral or Feeding Tube  Once per day on Monday Wednesday Friday Celine Damon APRN CNP   0.5 mcg at 03/12/25 1634    cetirizine (zyrTEC) tablet 10 mg  10 mg Oral or Feeding Tube At Bedtime Celine Damon APRN CNP   10 mg at 03/12/25 2119    furosemide (LASIX) tablet 80 mg  80 mg Oral or Feeding Tube Daily Celine Damon APRN CNP   80 mg at 03/13/25 1024    hydrALAZINE (APRESOLINE) tablet 75 mg  75 mg Oral Q8H Celine Damon APRN CNP   75 mg at 03/13/25 0518    insulin aspart (NovoLOG) injection (RAPID ACTING)  1-7 Units Subcutaneous TID AC Celine Damon APRN CNP   2 Units at 03/13/25 1010    insulin aspart (NovoLOG) injection (RAPID ACTING)  1-5 Units Subcutaneous At Bedtime Celine Damon APRN CNP   1 Units at 03/11/25 2236    insulin glargine (LANTUS PEN) injection 15 Units  15 Units Subcutaneous BID Cyril Ward MD   15 Units at 03/13/25 1010    midodrine (PROAMATINE) tablet 5 mg  5 mg Oral or Feeding Tube Once per day on Monday Wednesday Friday Celine Damon APRN CNP   5 mg at 03/12/25 0858    pantoprazole (PROTONIX) 2 mg/mL suspension 40 mg  40 mg Per Feeding Tube QAM AC Celine Damon APRN CNP   40 mg at 03/13/25 1025    sertraline (ZOLOFT) tablet 25 mg  25 mg Oral Daily Celine Damon APRN CNP   25 mg at 03/13/25 1026    sertraline (ZOLOFT) tablet 50 mg  50 mg Oral Daily Celine Damon APRN CNP   50 mg at 03/13/25 1025    sodium chloride (PF) 0.9% PF flush 3 mL  3 mL Intracatheter Q8H Celine Damon APRN CNP   3 mL at 03/13/25 1029    wound support modular (EXPEDITE) bottle 60 mL  60 mL Oral Daily Cyril Ward MD   60 mL at 03/13/25 1025     Current Facility-Administered Medications   Medication Dose Route Frequency Provider Last Rate Last Admin    acetaminophen (TYLENOL) tablet 650 mg  650 mg Oral Q4H PRN Celine Damon APRN CNP   650 mg at 03/11/25 0937    albumin human 25 % injection 50 mL  50 mL Intravenous Q1H PRN Tian Lim MD        albumin  human 5 % injection  mL   mL Intravenous Q1H PRN Tian Lim MD        albuterol (PROVENTIL HFA/VENTOLIN HFA) inhaler  2 puff Inhalation Q6H PRN Celine Damon APRN CNP        calcium carbonate (TUMS) chewable tablet 1,000 mg  1,000 mg Oral 4x Daily PRN Celine Damon APRN CNP   1,000 mg at 03/11/25 0937    carboxymethylcellulose PF (REFRESH PLUS) 0.5 % ophthalmic solution 1 drop  1 drop Both Eyes Q1H PRN Kurt Escobar NP        dextrose 10% infusion   Intravenous Continuous PRN Cyril Ward MD        glucose gel 15-30 g  15-30 g Oral Q15 Min PRN Cyril Ward MD        Or    dextrose 50 % injection 25-50 mL  25-50 mL Intravenous Q15 Min PRN Cyril Ward MD        Or    glucagon injection 1 mg  1 mg Subcutaneous Q15 Min PRN Cyril Ward MD        ipratropium - albuterol 0.5 mg/2.5 mg/3 mL (DUONEB) neb solution 3 mL  3 mL Nebulization Q4H PRN Celine Damon APRN CNP        lidocaine (LMX4) cream   Topical Q1H PRN Kurt Escobar NP        lidocaine 1 % 0.1-1 mL  0.1-1 mL Other Q1H PRN Kurt Escobar NP        lidocaine 1 % 0.5 mL  0.5 mL Intradermal Once PRN Tian Lim MD        lidocaine 1 % 0.5 mL  0.5 mL Intradermal Once PRN Tian Lim MD        melatonin tablet 3 mg  3 mg Oral At Bedtime PRN Cyril Ward MD        No lozenges or gum should be given while patient on BIPAP/AVAPS/AVAPS AE   Does not apply Continuous PRN Kurt Escobar NP        olopatadine (PATANOL) 0.1 % ophthalmic solution 1 drop  1 drop Both Eyes BID PRN Celine Damon APRN CNP        ondansetron (ZOFRAN ODT) ODT tab 4 mg  4 mg Oral Q6H PRN Celine Damon APRN CNP        Or    ondansetron (ZOFRAN) injection 4 mg  4 mg Intravenous Q6H PRN Celine Damon APRN CNP        Patient is already receiving anticoagulation with heparin, enoxaparin (LOVENOX), warfarin (COUMADIN)  or other anticoagulant medication    Does not apply Continuous PRN Celine Damon APRN CNP        Patient may continue current oral medications   Does not apply Continuous PRN Kurt Escobar NP        QUEtiapine (SEROquel) half-tab 12.5 mg  12.5 mg Oral BID PRN Cyril Ward MD        senna-docusate (SENOKOT-S/PERICOLACE) 8.6-50 MG per tablet 1 tablet  1 tablet Oral BID PRN Celine Damon APRN CNP        Or    senna-docusate (SENOKOT-S/PERICOLACE) 8.6-50 MG per tablet 2 tablet  2 tablet Oral BID PRN Celine Damon APRN CNP        sevelamer carbonate (RENVELA) tablet 800 mg  800 mg Oral BID PRN Celine Damon APRN CNP        sodium chloride (PF) 0.9% PF flush 3 mL  3 mL Intracatheter q1 min prn Celine Damon APRN CNP        sodium chloride 0.9% BOLUS 100-150 mL  100-150 mL Intravenous Q15 Min PRN Tian Lim MD        Stop Heparin 60 minutes before end of treatment   Does not apply Continuous PRN Tian Lim MD                Data:      All new lab and imaging data was reviewed.

## 2025-03-13 NOTE — PLAN OF CARE
Reason for Admission: ***    Cognitive/Mentation: A/Ox ***  Neuros/CMS: Intact ex ***  VS: ***.   Tele: ***.  /GI: ***Continent. Last BM ***.   Pulmonary: LS ***.  Pain: ***.     Drains/Lines: ***  Skin: ***  Activity: Assist x *** with ***.  Diet: *** with *** liquids. Takes pills ***.     Therapies recs: ***  Discharge: ***    Aggression Stoplight Tool: ***    End of shift summary: ***

## 2025-03-13 NOTE — PLAN OF CARE
"A&O x4. VSS on 2L O2 NC. Tele NSR. Turn/repo in bed, TAPS and pillows utilized.   Dialyzed today, had 3L fluid removed.   LS diminished.   BS+. Flatus+ BM+ today.   PEG tube site cleansed, had large crusting around it. New drainage sponge placed.   Denies pain.  Refused trial of taking any meds PO this morning for fear of them \"getting stuck\" in her throat.  This afternoon did try small capsule x2 and was able to swallow them without any coughing or s/s of difficulty. This afternoon writer discussed with pt and her daughter trial of taking PO meds tonight in hopes of weaning off of requiring admin via PEG tube. Can use pudding/applesauce to help. Pt agreeable to try.  Seems to be tolerating regular diet, though poor appetite.   PI on coccyx/sacrum, red/pink, blanchable. Foam dressing in place.    "

## 2025-03-13 NOTE — PROGRESS NOTES
[facility]   Pulmonary Progress Note  Supriya Herr MRN: [id number]  1950  Date of Admission:3/9/2025  Date of Service: 03/13/2025  ___________________________________  Assessment & Plan  Supriya Herr is a 75 year old female with PMHx most significant for CHF, anemia, CKD, hypertension, obesity hypoventilation, JONE, end-stage kidney disease on dialysis Monday Wednesday Friday that presented with acute on chronic dyspnea and diaphoresis.     Acute on chronic hypoxic respiratory failure  Moderate obstructive sleep apnea (AHI 19, APAP 5-20)  *RSV, COVID, flu: Negative  *cxr: Bilateral opacities consistent with pulmonary edema     Unfortunately, as seen between her multiple hospitalizations recently there is a very fine line in which Supriya is able to stay out of the hospital on a consistent basis.  Unfortunately this includes diligent management of her estimated dry weight with her dialysis along with doing everything we can to prevent intermittent increases in right-sided pressures.  Unfortunately when she does not wear her CPAP at night based on her sleep study in 2022 she will intermittently drop her oxygen to dangerous levels below 88% which will cause transient increases in right-sided pressures on her heart.  Taken together this will cause a vicious cycle of worsening of her left-sided output which then causes increased pulmonary edema and the cycle continues.  While being compliant with CPAP may not alleviate all these issues or prevent future hospitalizations, it is an absolute necessary for step.     VBG 3/12/2025 7.39/53, c/w mild chronic hypercapnia... however, she has not consistently had CPAP at night yet and this may improve overtime as her sleep apnea is finally treated on a consistent bases. This can be followed up as an outpatient.  If having issues with hypercapnia again, we can reassess.                   -Agree with attempting lower dry weight               -continues to use  CPAP at night when able               -continue to encourage CPAP   -follow up with sleep provider to assess need for BiPAP as outpatient in 1-2 months   -CPAP settings below   -ok to discharge from pulm standpoint    Device type: Auto-CPAP  PAP settings: CPAP min 5.0 cm  H20                           CPAP max 20.0 cm  H20                          95th% pressure 8.2 cm  H20                            RESMED EPR level Setting: THREE                          RESMED Soft response setting:  ON    I have personally reviewed the daily labs, imaging studies, cultures and discussed the case with referring physician and consulting physicians.     I spent 50 dedicated to her care so far today 03/13/2025 excluding procedures, including review of medical records, review of imaging (results & images), time with patient and time in documentation.    Kurt Lerner MD  Pulmonary & Critical Care   [facility]  Securely message with the Vocera Web Console (learn more here)    Interval History   Nursing notes reviewed.  Tried taking p.o. pills   Had dialysis yesterday  On 2 L nasal cannula, satting 99%  Wore CPAP but appears only for couple hours last night  Review of Systems   ROS:  6 point ROS including Respiratory, CV, GI and , other than that noted in the HPI, is negative    Physical Exam Temp:  [98.2  F (36.8  C)-98.5  F (36.9  C)] 98.2  F (36.8  C)  Pulse:  [57-84] 61  Resp:  [18-26] 18  BP: (110-144)/(38-73) 110/53  SpO2:  [97 %-100 %] 97 %  I/O last 3 completed shifts:  In: 690 [P.O.:480; NG/GT:210]  Out: 3000 [Other:3000]  Wt Readings from Last 1 Encounters:   03/12/25 93.6 kg (206 lb 5.6 oz)    Body mass index is 34.34 kg/m . FiO2 (%): 30 %, Resp: 18    Exam:  Constitutional: awake, alert, cooperative, no apparent distress, and appears stated age  HEENT: Anicteric sclera, EOMI, MMM  Cardiovascular: RRR, no mrg  Respiratory: decreased throughout, no wheezes  GI: normal bowel sounds,  non-distended, non-tender  Skin: no rashes and no jaundice  Musculoskeletal: bl edema, wwp  Neurologic: AAOx3, no new focal deficits  Data   Labs (all laboratory studies reviewed by me):   Hemoglobin 6.8  VBG 7.26/68     Imaging (all imaging studies reviewed by me):  Chest x-ray: Consistent with bilateral pulmonary edema     Procedures:   None  Medications   Current Facility-Administered Medications   Medication Dose Route Frequency Provider Last Rate Last Admin    - MEDICATION INSTRUCTIONS for Dialysis Patients -   Does not apply See Admin Instructions Cyril Ward MD        amiodarone (PACERONE) tablet 200 mg  200 mg Oral or Feeding Tube BID Celine Damon APRN CNP   200 mg at 03/12/25 2120    amLODIPine (NORVASC) tablet 10 mg  10 mg Oral or Feeding Tube Once per day on Sunday Tuesday Thursday Saturday Celine Damon APRN CNP   10 mg at 03/11/25 0938    apixaban ANTICOAGULANT (ELIQUIS) tablet 5 mg  5 mg Oral or Feeding Tube BID Celine Damon APRN CNP   5 mg at 03/12/25 2120    atorvastatin (LIPITOR) tablet 20 mg  20 mg Oral or Feeding Tube QPM Celine Damon APRN CNP   20 mg at 03/12/25 2119    B and C vitamin Complex with folic acid (NEPHRONEX) liquid 5 mL  5 mL Per Feeding Tube Daily Cyril Ward MD   5 mL at 03/12/25 1634    calcitRIOL (ROCALTROL) capsule 0.5 mcg  0.5 mcg Oral or Feeding Tube Once per day on Monday Wednesday Friday Celine Damon APRN CNP   0.5 mcg at 03/12/25 1634    cetirizine (zyrTEC) tablet 10 mg  10 mg Oral or Feeding Tube At Bedtime Celine Damon APRN CNP   10 mg at 03/12/25 2119    furosemide (LASIX) tablet 80 mg  80 mg Oral or Feeding Tube Daily Celine Damon APRN CNP   80 mg at 03/12/25 1633    hydrALAZINE (APRESOLINE) tablet 75 mg  75 mg Oral Q8H Celine Damon APRN CNP   75 mg at 03/13/25 0518    insulin aspart (NovoLOG) injection (RAPID ACTING)  1-7 Units Subcutaneous TID Celine Thacker, PENELOPE CNP   2 Units at 03/12/25  0900    insulin aspart (NovoLOG) injection (RAPID ACTING)  1-5 Units Subcutaneous At Bedtime Celine Damon APRN CNP   1 Units at 03/11/25 2236    insulin glargine (LANTUS PEN) injection 15 Units  15 Units Subcutaneous BID Cyril Ward MD   15 Units at 03/13/25 0007    midodrine (PROAMATINE) tablet 5 mg  5 mg Oral or Feeding Tube Once per day on Monday Wednesday Friday Celine Damon APRN CNP   5 mg at 03/12/25 0858    pantoprazole (PROTONIX) 2 mg/mL suspension 40 mg  40 [facility] Celine Damon APRN CNP   40 mg at 03/12/25 0636    sertraline (ZOLOFT) tablet 25 mg  25 mg Oral Daily Celine Damon APRN CNP   25 mg at 03/12/25 0857    sertraline (ZOLOFT) tablet 50 mg  50 mg Oral Daily Celine Damon APRN CNP   50 mg at 03/12/25 0858    sodium chloride (PF) 0.9% PF flush 3 mL  3 mL Intracatheter Q8H Celine Damon APRN CNP   3 mL at 03/12/25 1636    wound support modular (EXPEDITE) bottle 60 mL  60 mL Oral Daily Cyril Ward MD   60 mL at 03/12/25 1634

## 2025-03-13 NOTE — PROGRESS NOTES
Renal Medicine Progress Note            Assessment/Plan:     # ESRD               -MWF               -LAVF               -[facility]     # HTN: BP is at target.   # IDDM  # Anemia  # Normal LV/RV function     Plan:  # Hemodialysis order placed for tomorrow.   # Hold BP medications the morning of dialysis             Interval History:     Afebrile.   VSS.   Patient is talking on the phone.             Medications and Allergies:     Current Facility-Administered Medications   Medication Dose Route Frequency Provider Last Rate Last Admin    - MEDICATION INSTRUCTIONS for Dialysis Patients -   Does not apply See Admin Instructions Cyril Ward MD        amiodarone (PACERONE) tablet 200 mg  200 mg Oral or Feeding Tube BID Celine Damon APRN CNP   200 mg at 03/13/25 1024    amLODIPine (NORVASC) tablet 10 mg  10 mg Oral or Feeding Tube Once per day on Sunday Tuesday Thursday Saturday Celine Damon APRN CNP   10 mg at 03/13/25 1024    apixaban ANTICOAGULANT (ELIQUIS) tablet 5 mg  5 mg Oral or Feeding Tube BID Celine Damon APRN CNP   5 mg at 03/13/25 1024    atorvastatin (LIPITOR) tablet 20 mg  20 mg Oral or Feeding Tube QPM Celine Damon APRN CNP   20 mg at 03/12/25 2119    B and C vitamin Complex with folic acid (NEPHRONEX) liquid 5 mL  5 mL Per Feeding Tube Daily Cyril Ward MD   5 mL at 03/13/25 1025    calcitRIOL (ROCALTROL) capsule 0.5 mcg  0.5 mcg Oral or Feeding Tube Once per day on Monday Wednesday Friday Celine Damon APRN CNP   0.5 mcg at 03/12/25 1634    cetirizine (zyrTEC) tablet 10 mg  10 mg Oral or Feeding Tube At Bedtime Celine Damon APRN CNP   10 mg at 03/12/25 2119    furosemide (LASIX) tablet 80 mg  80 mg Oral or Feeding Tube Daily Celine Damon APRN CNP   80 mg at 03/13/25 1024    hydrALAZINE (APRESOLINE) tablet 75 mg  75 mg Oral Q8H Celine Damon APRN CNP   75 mg at 03/13/25 0518    insulin aspart (NovoLOG) injection (RAPID ACTING)  1-7  "Units Subcutaneous TID  Celine Damon APRN CNP   2 Units at 03/13/25 1010    insulin aspart (NovoLOG) injection (RAPID ACTING)  1-5 Units Subcutaneous At Bedtime Celine Damon APRN CNP   1 Units at 03/11/25 2236    insulin glargine (LANTUS PEN) injection 15 Units  15 Units Subcutaneous BID Cyril Ward MD   15 Units at 03/13/25 1010    midodrine (PROAMATINE) tablet 5 mg  5 mg Oral or Feeding Tube Once per day on Monday Wednesday Friday Celine Damon APRN CNP   5 mg at 03/12/25 0858    pantoprazole (PROTONIX) 2 mg/mL suspension 40 mg  40 mg Per Feeding Tube QAM  Celine Damon APRN CNP   40 mg at 03/13/25 1025    sertraline (ZOLOFT) tablet 25 mg  25 mg Oral Daily Celine Damon APRN CNP   25 mg at 03/13/25 1026    sertraline (ZOLOFT) tablet 50 mg  50 mg Oral Daily Celine Damon APRN CNP   50 mg at 03/13/25 1025    sodium chloride (PF) 0.9% PF flush 3 mL  3 mL Intracatheter Q8H Celine Damon APRN CNP   3 mL at 03/13/25 1029    wound support modular (EXPEDITE) bottle 60 mL  60 mL Oral Daily Cyril Ward MD   60 mL at 03/13/25 1025        Allergies   Allergen Reactions    Ampicillin-Sulbactam Sodium Rash     No evidence SJS, but very uncomfortable and precipitated multiple provider visits. Would not use penicillins again if other options available.     Penicillins Rash            Physical Exam:   Vitals were reviewed   , Blood pressure 133/50, pulse 65, temperature 98.2  F (36.8  C), temperature source Axillary, resp. rate 18, height 1.651 m (5' 5\"), weight 93.6 kg (206 lb 5.6 oz), SpO2 99%, not currently breastfeeding.    Wt Readings from Last 3 Encounters:   03/12/25 93.6 kg (206 lb 5.6 oz)   03/06/25 101.8 kg (224 lb 6.9 oz)   02/26/25 92.5 kg (203 lb 14.8 oz)       Intake/Output Summary (Last 24 hours) at 3/13/2025 1342  Last data filed at 3/13/2025 1020  Gross per 24 hour   Intake 700 ml   Output 3000 ml   Net -2300 ml     GENERAL APPEARANCE: NAD  HEENT:  " Eyes/ears/nose/neck grossly normal  RESP:No wheezes.  CV: RRR  ABDOMEN: o/s/nt/nd, bs present  EXTREMITIES/SKIN:No edema. L CAROLE  NEURO: Awake, alert and answering questions         Data:     CBC RESULTS:     Recent Labs   Lab 03/13/25  0917 03/12/25  0603 03/11/25  0702 03/10/25  1630 03/10/25  0642 03/09/25  1050   WBC  --   --  5.8  --  5.2 5.6   RBC  --   --  2.83*  --  2.22* 2.31*   HGB 8.7* 8.3* 8.5* 8.3* 6.8* 7.0*   HCT  --   --  27.4*  --  21.9* 23.1*   PLT  --   --  170  --  143* 158       Basic Metabolic Panel:  Recent Labs   Lab 03/13/25  1135 03/13/25  0828 03/12/25  2128 03/12/25  1742 03/12/25  1429 03/12/25  0826 03/11/25  1140 03/11/25  0702 03/10/25  0934 03/10/25  0642 03/09/25  1759 03/09/25  1050   NA  --   --   --   --   --   --   --  137  --  138  --  135   POTASSIUM  --   --   --   --   --   --   --  4.0  --  4.7  --  4.8   CHLORIDE  --   --   --   --   --   --   --  97*  --  101  --  97*   CO2  --   --   --   --   --   --   --  29  --  26  --  27   BUN  --   --   --   --   --   --   --  26.2*  --  56.6*  --  47.6*   CR  --   --   --   --   --   --   --  2.89*  --  4.79*  --  3.78*   * 191* 122* 119* 96 206*   < > 97   < > 87   < > 343*   JODY  --   --   --   --   --   --   --  9.7  --  9.9  --  9.9    < > = values in this interval not displayed.       INRNo lab results found in last 7 days.   Attestation:   I have reviewed today's relevant vital signs, notes, medications, labs and imaging.    Tian Lim MD  [facility]phrology  Office phone :[phone]  Pager: [phone]

## 2025-03-13 NOTE — PLAN OF CARE
From [facility].  Admitted 3/9 with SOB, diaphoresis, nausea, HTN, and hyperglycemia.   Dialysis M-W-F    Reason for Admission: Acute Hypoxic Respiratory Failure, Pleural Effusions, Pulmonary Hypertension.     Cognitive/Mentation: A/Ox 2; disoriented to time and place.   Neuros/CMS: Intact ex generalized weakness, L AKA, tremors/spasms at times.  VS: VSS on 2L NC or BiPAP/CPAP overnight, as tolerated.    Tele: SR w/ ST depression.  /GI: Incontinent. Patient appears to have limited urine output. Bladder scanned in AM for 27 mL. Last BM 3/12/25, scant x2.   Pulmonary: LS diminished, coarse at bases.  Pain: denies.. Endorsed pain x1 throughout the night, described as pain from her BiPAP/CPAP, refused tylenol.     Drains/Lines:   LUE fistula, positive bruit and thrill.   Skin: Pressure injury to sacrum/coccyx, scattered bruises. L AKA. PEG tube.  Activity: Assist x 2 with lift.  Diet: Regular with thin liquids. FR 1500mL. Did very well taking pills whole, one at a time with applesauce, and followed by a sip of water.    PEG ran from 8pm-6am at goal rate of 65 mL/hr.     Therapies recs: Pending  Discharge: Return to [facility].     Aggression Stoplight Tool: Green.    End of shift summary:   ACHS BG checks.

## 2025-03-13 NOTE — PROVIDER NOTIFICATION
MD Notification    Notified Person: MD    Notified Person Name: Dr. Jed Lang    Notification Date/Time: 3/13/25 3057    Notification Interaction: Vocera    Purpose of Notification:  Hello, patient is restless, anxious, and cannot sleep. Could we try atarax or something similar for her?    Orders Received:  One time dose of ativan 0.5mg    Comments:   Received very well by patient. Patient very grateful and fell asleep soon after receiving medication.  Pt may benefit from anxiety meds at HS.

## 2025-03-14 LAB
ANION GAP SERPL CALCULATED.3IONS-SCNC: 10 MMOL/L (ref 7–15)
BUN SERPL-MCNC: 46.5 MG/DL (ref 8–23)
CALCIUM SERPL-MCNC: 10.1 MG/DL (ref 8.8–10.4)
CHLORIDE SERPL-SCNC: 92 MMOL/L (ref 98–107)
CREAT SERPL-MCNC: 4.07 MG/DL (ref 0.51–0.95)
EGFRCR SERPLBLD CKD-EPI 2021: 11 ML/MIN/1.73M2
GLUCOSE BLDC GLUCOMTR-MCNC: 149 MG/DL (ref 70–99)
GLUCOSE BLDC GLUCOMTR-MCNC: 169 MG/DL (ref 70–99)
GLUCOSE BLDC GLUCOMTR-MCNC: 173 MG/DL (ref 70–99)
GLUCOSE BLDC GLUCOMTR-MCNC: 207 MG/DL (ref 70–99)
GLUCOSE BLDC GLUCOMTR-MCNC: 83 MG/DL (ref 70–99)
GLUCOSE SERPL-MCNC: 136 MG/DL (ref 70–99)
HCO3 SERPL-SCNC: 29 MMOL/L (ref 22–29)
POTASSIUM SERPL-SCNC: 4.7 MMOL/L (ref 3.4–5.3)
SODIUM SERPL-SCNC: 131 MMOL/L (ref 135–145)

## 2025-03-14 PROCEDURE — 90935 HEMODIALYSIS ONE EVALUATION: CPT | Performed by: INTERNAL MEDICINE

## 2025-03-14 PROCEDURE — 250N000009 HC RX 250: Performed by: INTERNAL MEDICINE

## 2025-03-14 PROCEDURE — 99232 SBSQ HOSP IP/OBS MODERATE 35: CPT | Performed by: HOSPITALIST

## 2025-03-14 PROCEDURE — 120N000001 HC R&B MED SURG/OB

## 2025-03-14 PROCEDURE — 250N000011 HC RX IP 250 OP 636: Performed by: INTERNAL MEDICINE

## 2025-03-14 PROCEDURE — 90937 HEMODIALYSIS REPEATED EVAL: CPT

## 2025-03-14 PROCEDURE — 99232 SBSQ HOSP IP/OBS MODERATE 35: CPT | Performed by: STUDENT IN AN ORGANIZED HEALTH CARE EDUCATION/TRAINING PROGRAM

## 2025-03-14 PROCEDURE — 94660 CPAP INITIATION&MGMT: CPT

## 2025-03-14 PROCEDURE — 80051 ELECTROLYTE PANEL: CPT | Performed by: INTERNAL MEDICINE

## 2025-03-14 PROCEDURE — 250N000013 HC RX MED GY IP 250 OP 250 PS 637: Performed by: HOSPITALIST

## 2025-03-14 PROCEDURE — P9047 ALBUMIN (HUMAN), 25%, 50ML: HCPCS | Performed by: INTERNAL MEDICINE

## 2025-03-14 PROCEDURE — 999N000157 HC STATISTIC RCP TIME EA 10 MIN

## 2025-03-14 PROCEDURE — 250N000013 HC RX MED GY IP 250 OP 250 PS 637: Performed by: NURSE PRACTITIONER

## 2025-03-14 PROCEDURE — 5A1D70Z PERFORMANCE OF URINARY FILTRATION, INTERMITTENT, LESS THAN 6 HOURS PER DAY: ICD-10-PCS | Performed by: INTERNAL MEDICINE

## 2025-03-14 PROCEDURE — 250N000011 HC RX IP 250 OP 636: Performed by: NURSE PRACTITIONER

## 2025-03-14 PROCEDURE — 80048 BASIC METABOLIC PNL TOTAL CA: CPT | Performed by: INTERNAL MEDICINE

## 2025-03-14 RX ORDER — HEPARIN SODIUM 1000 [USP'U]/ML
500 INJECTION, SOLUTION INTRAVENOUS; SUBCUTANEOUS CONTINUOUS
Status: DISCONTINUED | OUTPATIENT
Start: 2025-03-14 | End: 2025-03-14

## 2025-03-14 RX ADMIN — ATORVASTATIN CALCIUM 20 MG: 20 TABLET, FILM COATED ORAL at 20:24

## 2025-03-14 RX ADMIN — APIXABAN 5 MG: 5 TABLET, FILM COATED ORAL at 20:24

## 2025-03-14 RX ADMIN — AMIODARONE HYDROCHLORIDE 200 MG: 200 TABLET ORAL at 20:24

## 2025-03-14 RX ADMIN — Medication 5 ML: at 08:34

## 2025-03-14 RX ADMIN — SERTRALINE HYDROCHLORIDE 50 MG: 50 TABLET ORAL at 08:35

## 2025-03-14 RX ADMIN — Medication 40 MG: at 08:39

## 2025-03-14 RX ADMIN — Medication 60 ML: at 08:37

## 2025-03-14 RX ADMIN — CALCITRIOL CAPSULES 0.25 MCG 0.5 MCG: 0.25 CAPSULE ORAL at 08:35

## 2025-03-14 RX ADMIN — SERTRALINE HYDROCHLORIDE 25 MG: 25 TABLET ORAL at 08:35

## 2025-03-14 RX ADMIN — MIDODRINE HYDROCHLORIDE 5 MG: 5 TABLET ORAL at 08:34

## 2025-03-14 RX ADMIN — ONDANSETRON 4 MG: 2 INJECTION, SOLUTION INTRAMUSCULAR; INTRAVENOUS at 10:19

## 2025-03-14 RX ADMIN — AMIODARONE HYDROCHLORIDE 200 MG: 200 TABLET ORAL at 08:36

## 2025-03-14 RX ADMIN — LIDOCAINE HYDROCHLORIDE 0.5 ML: 10 INJECTION, SOLUTION EPIDURAL; INFILTRATION; INTRACAUDAL; PERINEURAL at 09:36

## 2025-03-14 RX ADMIN — HYDRALAZINE HYDROCHLORIDE 75 MG: 50 TABLET ORAL at 14:37

## 2025-03-14 RX ADMIN — APIXABAN 5 MG: 5 TABLET, FILM COATED ORAL at 08:36

## 2025-03-14 RX ADMIN — HYDRALAZINE HYDROCHLORIDE 75 MG: 50 TABLET ORAL at 20:24

## 2025-03-14 RX ADMIN — ALBUMIN HUMAN 50 ML: 0.25 SOLUTION INTRAVENOUS at 12:14

## 2025-03-14 RX ADMIN — CETIRIZINE HYDROCHLORIDE 10 MG: 10 TABLET, FILM COATED ORAL at 21:44

## 2025-03-14 ASSESSMENT — ACTIVITIES OF DAILY LIVING (ADL)
ADLS_ACUITY_SCORE: 83

## 2025-03-14 NOTE — PROGRESS NOTES
[facility]   Pulmonary Progress Note  Supriya Herr MRN: [id number]  1950  Date of Admission:3/9/2025  Date of Service: 03/14/2025  ___________________________________  Assessment & Plan  Supriya Herr is a 75 year old female with PMHx most significant for CHF, anemia, CKD, hypertension, obesity hypoventilation, JONE, end-stage kidney disease on dialysis Monday Wednesday Friday that presented with acute on chronic dyspnea and diaphoresis.     Acute on chronic hypoxic respiratory failure  Moderate obstructive sleep apnea (AHI 19, APAP 5-20)  *RSV, COVID, flu: Negative  *cxr: Bilateral opacities consistent with pulmonary edema     Unfortunately, as seen between her multiple hospitalizations recently there is a very fine line in which Supriya is able to stay out of the hospital on a consistent basis.  Unfortunately this includes diligent management of her estimated dry weight with her dialysis along with doing everything we can to prevent intermittent increases in right-sided pressures.  Unfortunately when she does not wear her CPAP at night based on her sleep study in 2022 she will intermittently drop her oxygen to dangerous levels below 88% which will cause transient increases in right-sided pressures on her heart.  Taken together this will cause a vicious cycle of worsening of her left-sided output which then causes increased pulmonary edema and the cycle continues.  While being compliant with CPAP may not alleviate all these issues or prevent future hospitalizations, it is an absolute necessary for step.     VBG 3/12/2025 7.39/53, c/w mild chronic hypercapnia... however, she has not consistently had CPAP at night yet and this may improve overtime as her sleep apnea is finally treated on a consistent bases. This can be followed up as an outpatient.  If having issues with hypercapnia again, we can reassess.                   -Agree with attempting lower dry weight    -- Suspect compliant use of  CPAP will help with mobilizing fluid               -continues to use CPAP at night when able               -continue to encourage CPAP   -follow up with sleep provider to assess need for BiPAP as outpatient in 1-2 months   -CPAP settings below   -ok to discharge from pulm standpoint    Device type: Auto-CPAP  PAP settings: CPAP min 5.0 cm  H20                           CPAP max 20.0 cm  H20                          95th% pressure 8.2 cm  H20                            RESMED EPR level Setting: THREE                          RESMED Soft response setting:  ON    I have personally reviewed the daily labs, imaging studies, cultures and discussed the case with referring physician and consulting physicians.     I spent 35 dedicated to her care so far today 03/14/2025 excluding procedures, including review of medical records, review of imaging (results & images), time with patient and time in documentation.    Kurt Lerner MD  Pulmonary & Critical Care   [facility]  Securely message with the Vocera Web Console (learn more here)    Interval History   Nursing notes reviewed.  Tolerate CPAP from 11 PM to 6 AM  Planning for dialysis today  Almost down 30 pounds since admission  Suspect using CPAP at night is also helping mobilize fluid  ------------  Tried taking p.o. pills   Had dialysis yesterday  On 2 L nasal cannula, satting 99%  Wore CPAP but appears only for couple hours last night  Review of Systems   ROS:  6 point ROS including Respiratory, CV, GI and , other than that noted in the HPI, is negative    Physical Exam Temp:  [98.1  F (36.7  C)-98.7  F (37.1  C)] 98.7  F (37.1  C)  Pulse:  [64-69] 64  Resp:  [18] 18  BP: (119-133)/(39-51) 126/51  FiO2 (%):  [30 %] 30 %  SpO2:  [92 %-99 %] 99 %  I/O last 3 completed shifts:  In: 861 [P.O.:50; NG/GT:811]  Out: -   Wt Readings from Last 1 Encounters:   03/14/25 89 kg (196 lb 3.4 oz)    Body mass index is 32.65 kg/m . FiO2 (%): 30 %,  Resp: 18    Exam:  Constitutional: awake, alert, cooperative, no apparent distress, and appears stated age  HEENT: Anicteric sclera, EOMI, MMM  Cardiovascular: RRR, no mrg  Respiratory: decreased throughout, no wheezes  GI: normal bowel sounds, non-distended, non-tender  Skin: no rashes and no jaundice  Musculoskeletal: bl edema, wwp  Neurologic: AAOx3, no new focal deficits  Data   Labs (all laboratory studies reviewed by me):   Hemoglobin 6.8  VBG 7.26/68     Imaging (all imaging studies reviewed by me):  Chest x-ray: Consistent with bilateral pulmonary edema     Procedures:   None  Medications   Current Facility-Administered Medications   Medication Dose Route Frequency Provider Last Rate Last Admin    - MEDICATION INSTRUCTIONS for Dialysis Patients -   Does not apply See Admin Instructions Cyril Ward MD        amiodarone (PACERONE) tablet 200 mg  200 mg Oral or Feeding Tube BID Celine Damon APRN CNP   200 mg at 03/13/25 2050    amLODIPine (NORVASC) tablet 10 mg  10 mg Oral or Feeding Tube Once per day on Sunday Tuesday Thursday Saturday Celine Damon APRN CNP   10 mg at 03/13/25 1024    apixaban ANTICOAGULANT (ELIQUIS) tablet 5 mg  5 mg Oral or Feeding Tube BID Celine Damon APRN CNP   5 mg at 03/13/25 2050    atorvastatin (LIPITOR) tablet 20 mg  20 mg Oral or Feeding Tube QPM Celine Damon APRN CNP   20 mg at 03/13/25 2049    B and C vitamin Complex with folic acid (NEPHRONEX) liquid 5 mL  5 mL Per Feeding Tube Daily Cyril Ward MD   5 mL at 03/13/25 1025    calcitRIOL (ROCALTROL) capsule 0.5 mcg  0.5 mcg Oral or Feeding Tube Once per day on Monday Wednesday Friday Celine Damon APRN CNP   0.5 mcg at 03/12/25 1634    cetirizine (zyrTEC) tablet 10 mg  10 mg Oral or Feeding Tube At Bedtime Celine Damon APRN CNP   10 mg at 03/13/25 2052    furosemide (LASIX) tablet 80 mg  80 mg Oral or Feeding Tube Daily Celine Damon APRN CNP   80 mg at 03/13/25 1024     heparin (porcine) injection  500 Units Hemodialysis Machine OR IV Push Once in dialysis/CRRT Tian Lim MD        hydrALAZINE (APRESOLINE) tablet 75 mg  75 mg Oral Q8H Celine Damon APRN CNP   75 mg at 03/13/25 2049    insulin aspart (NovoLOG) injection (RAPID ACTING)  1-7 Units Subcutaneous TID AC Celine Damon APRN CNP   2 Units at 03/13/25 1010    insulin aspart (NovoLOG) injection (RAPID ACTING)  1-5 Units Subcutaneous At Bedtime Celine Damon APRN CNP   1 Units at 03/11/25 2236    insulin glargine (LANTUS PEN) injection 15 Units  15 Units Subcutaneous BID Cyril Ward MD   15 Units at 03/13/25 2211    midodrine (PROAMATINE) tablet 5 mg  5 mg Oral or Feeding Tube Once per day on Monday Wednesday Friday Celine Damon APRN CNP   5 mg at 03/12/25 0858    pantoprazole (PROTONIX) 2 mg/mL suspension 40 mg  40 mg Per Feeding Tube QAM AC Celine Damon APRN CNP   40 mg at 03/13/25 1025    sertraline (ZOLOFT) tablet 25 mg  25 mg Oral or Feeding Tube Daily Cyril Ward MD        sertraline (ZOLOFT) tablet 50 mg  50 mg Oral or Feeding Tube Daily Cyril Ward MD        sodium chloride (PF) 0.9% PF flush 3 mL  3 mL Intracatheter Q8H Celine Damon APRN CNP   3 mL at 03/14/25 0410    sodium chloride 0.9% BOLUS 200 mL  200 mL Hemodialysis Machine Once Tian Lim MD        sodium chloride 0.9% BOLUS 250 mL  250 mL Intravenous Once in dialysis/CRRT Tian Lim MD        sodium chloride 0.9% BOLUS 500 mL  500 mL Hemodialysis Machine Once Tian Lim MD        sodium chloride 0.9% BOLUS 500 mL  500 mL Hemodialysis Machine Once Tian Lim MD        wound support modular (EXPEDITE) bottle 60 mL  60 mL Oral Daily Cyril Ward MD   60 mL at 03/13/25 1025

## 2025-03-14 NOTE — PROGRESS NOTES
Care Management Follow Up    Length of Stay (days): 5    Expected Discharge Date: 03/14/2025     Concerns to be Addressed: discharge planning     Patient plan of care discussed at interdisciplinary rounds: Yes    Anticipated Discharge Disposition: Transitional Care  Anticipated Discharge Services:    Anticipated Discharge DME:      Patient/family educated on Medicare website which has current facility and service quality ratings: no  Education Provided on the Discharge Plan: Yes  Patient/Family in Agreement with the Plan: yes    Referrals Placed by CM/SW: Post Acute Facilities  Private pay costs discussed: Not applicable    Discussed  Partnership in Safe Discharge Planning  document with patient/family: No     Handoff Completed: No, handoff not indicated or clinically appropriate    Additional Information:  SW completed PAS: [id number]    PAS-RR    D: Per DHS regulation, HIGINIO completed and submitted PAS-RR to [place] [employer] Direct Connect via the [employer] Line.  PAS-RR confirmation # is : [id number]    P: Further questions may be directed to [facility] at #[phone], option #4 for PAS-RR staff.   HIGINIO updated pt is ready to discharge back to TCU today.    HIGINIO called [facility] to confirm they are able to take pt back today. [facility] confirmed they can, with no time restrictions on this.    HIGINIO call [facility] - set up transport with Max: Stretcher [phone] - [phone] today.     HIGINIO called back [facility] admissions: [phone] to give them the transport time. HIGINIO left voicemail indicating time and discharge order will be sent through [employer].     Addendum 1236: HIGINIO notified by senior linkage line that  pt triggered an Obra Level II assessment with Sae mccartney.  SW ask if they could confirm this is correct as pt does not have a DD diagnosis and the last PAS did not trigger an Obra II.  [facility] that it looks like [employer] flagged this so that it would  trigger:   HIGINIO received the [facility] DD department contact info and the  partners contact info as follows:   [facility] DD: Dar [phone] - SW called and left voicemail with request for an urgent call back.     [place] [facility]: ROCIO Ellen: [phone].   SW called and left a voicemail requesting a call back at pt is on a CADI waiver and not DD, uncertain this should be triggering an Obra II.    HIGINIO updated providers and nursing on assessment needed.      Addendum 1524: HIGINIO did not hear a response back from [facility] regarding Obra level II assessment for today. HIGINIO called and cancelled transport.   HIGINIO spoke with Dr. Ward to update that [facility] assessment will not be completed today, Dr. Ward will call and update Daughter.     HIGINIO called TCU to update on delay in discharge.     Next Steps: Send discharge orders to TCU. Monitor status of Obra II assessment- update team if able to complete today, if not pt will likely be here until Monday or Tuesday until [facility] can complete the assessment.     Rafia Ugalde, BSW

## 2025-03-14 NOTE — PROGRESS NOTES
Potassium   Date Value Ref Range Status   03/14/2025 4.7 3.4 - 5.3 mmol/L Final   02/02/2022 4.7 3.4 - 5.3 mmol/L Final   04/17/2021 4.2 3.4 - 5.3 mmol/L Final     Hemoglobin   Date Value Ref Range Status   03/13/2025 8.7 (L) 11.7 - 15.7 g/dL Final   04/16/2021 10.9 (L) 11.7 - 15.7 g/dL Final     Creatinine   Date Value Ref Range Status   03/14/2025 4.07 (H) 0.51 - 0.95 mg/dL Final   04/17/2021 5.98 (H) 0.52 - 1.04 mg/dL Final     Urea Nitrogen   Date Value Ref Range Status   03/14/2025 46.5 (H) 8.0 - 23.0 mg/dL Final   11/24/2021 37 (H) 7 - 30 mg/dL Final   04/17/2021 68 (H) 7 - 30 mg/dL Final     Sodium   Date Value Ref Range Status   03/14/2025 131 (L) 135 - 145 mmol/L Final   04/17/2021 137 133 - 144 mmol/L Final     INR   Date Value Ref Range Status   02/14/2025 1.57 (H) 0.85 - 1.15 Final   04/16/2021 1.14 0.86 - 1.14 Final       DIALYSIS PROCEDURE NOTE  Hepatitis status of previous patient on machine log was checked and verified ok to use with this patients hepatitis status.  Patient dialyzed for 3.5 hrs. on a K3 bath with a net fluid removal of 1.8L.  A BFR of 350 ml/min was obtained via a Left AVF using 16 gauge needles.      The treatment plan was discussed with Dr. Lim during the treatment.    Total heparin received during the treatment: 0 units.   Needle cannulation sites held x 10 min. Lidocaine given on both cannulation sites pretreatment    Meds given: 50ml 25% albumin  infused for soft pressures  Complications: None      Person educated: Patient. Knowledge base adequate. Barriers to learning: none. Educated on Procedure via verbal mode. Pt prefers verbal education style.     ICEBOAT? Timeout performed pre-treatment  I: Patient was identified using 2 identifiers  C:  Consent Signed Yes  E: Equipment preventative maintenance is current and dialysis delivery system OK to use  B: Hepatitis B Surface Antigen: Negative; Draw Date: 3/5/25      Hepatitis B Surface Antibody: Susceptible; Draw Date: 3/5/25  O:  Dialysis orders present and complete prior to treatment  A: Vascular access verified and assessed prior to treatment  T: Treatment was performed at a clinically appropriate time  ?: Patient was allowed to ask questions and address concerns prior to treatment  See Adult Hemodialysis flowsheet in EPIC for further details and post assessment.  Machine water alarm in place and functioning. Transducer pods intact and checked every 15min.   Pt returned via bed.  Chlorine/Chloramine water system checked every 4 hours.  Outpatient Dialysis at [facility]    Patient repositioned every 2 hours during the treatment.  Post treatment report given to Michelle.SHANTA GARDNER regarding 1.8L of fluid removed, last BP of 124/46 , and patient pain rating of 0/10.     Please remove patient dressing on AVF and AVG needle sites 24 hours after dialysis. If leaking occurs please apply a Band-Aid.

## 2025-03-14 NOTE — PROGRESS NOTES
[facility]  Hospitalist Progress Note   03/14/2025          Assessment and Plan:       Supriya Herr is a 75 year old female with history of ESRD on hemodialysis, heart failure with preserved EF, COPD, JONE, chronic anemia, hypertension, hyperlipidemia, poor mobility due to left AKA, obesity, anxiety, depression, former nicotine use admitted on 3/9/2025 for shortness of breath.    *Hospitalized at [facility] from 3/2 - 3/6/2025 with shortness of breath likely multifactorial.  Volume status was difficult to determine, underwent right heart cath on 3/5 showed mildly elevated right atrial and PCWP readings.  Was treated for acute respiratory failure likely from heart failure exacerbation, atrial fibrillation, end-stage renal disease and discharged to care facility with supplemental nocturnal oxygen.  *Prolonged hospitalized at [facility] from 1/8 to 2/26/2025 for acute hypoxic respiratory failure multifactorial, was intubated ( 1/9-1/18), reintubated (1/20 - 1/25).  Then was treated for shock with pressors, influenza A pneumonia, hospital-acquired pneumonia with intravenous antibiotics, steroids, antivirals.  Then also noted to have heart failure exacerbation, recurrent A-fib with RVR, subsequent bradycardia.  Then also noted to have encephalopathy likely from infectious, metabolic etiology and delirium.  During that hospitalization was noted to have dysphagia, pharyngeal edema, was evaluated by ENT on 2/16.  No findings to explain dysphagia.  G-tube was placed by IR on 2/17 and was on nocturnal tube feeds.  Discharged to TCU for rehabilitation.      Acute hypoxic respiratory failure multifactorial -likely from heart failure exacerbation, ESRD on hemodialysis, pulmonary hypertension, JONE not compliant with CPAP, physical deconditioning, recent pneumonia, anxiety related, anemia.  On 3/9, the patient reported shortness of breath at the TCU staff, felt nauseated and ultimately was found to be hypoxic in the low 80s  prompting transfer to ER.  Weights have been extremely variable based on documentation, EDW is predicted to be approximately 92.5 kg.  O2 sats are variable depending on anxiety/underlying issues.  *CXR 3/9/2025 showing bilateral interstitial and alveolar opacities, small pleural effusions, mild cardiomegaly, findings suggestive of pulmonary edema and volume overload.  --Patient was admitted to [facility], underwent dialysis, intermittent BiPAP support, decreased tube feeding, free water flushes, was on fluid restriction with which patient having improvement in shortness of breath.  IMC status discontinued 3/11.    --Weaned off from Bipap and has been using CPAP for naps/nighttime  -- Continue CPAP with naps/nighttime.  Encourage use.  -- Supplemental nasal oxygen during day as needed.   -- Pulmonology followed, recommend CPAP on discharge.  See note for details.  Appreciate input.  -- Nephrology followed, continue dialysis MWF, prior to admission Lasix discontinued ineffective.  Appreciate input.  -- Cardiology reviewed patient's chart, continue cardiac meds.  Appreciate input   -- Decreased tube feeding to 65 mL/h for 10 hours.  Decreasedree water flushes to 15 mL before and after medication, free water to 30 mL before and after tube feeds.  Can further decrease tube feeds if oral intake improves.  Monitor electrolytes closely.  --Avoid benzodiazepines.  Of note patient was drowsy on 3/13 after receiving one-time dose of Ativan.   --Address medical issues as discussed  -- 1500 mL fluid restriction.  Strict intake output monitoring.  Daily weights.  -- Aggressive incentive spirometry.  Encourage ambulation as able to tolerate.  -- Feeding with aspiration precautions.  --Close follow-up imaging of chest in few weeks for resolution.  -follow up with sleep provider to assess need for BiPAP as outpatient in 1-2 months      Goals of Care  -- Have discussed with patient, daughter goals of care during this hospitalization.   Patient would like to continue restorative care and live as long as able to.  Revisited goals of care discussion with patient's daughter -  continue no CPR, prearrest intubation okay.  Elected for restorative care.  Patient and family declined palliative evaluation.  Patient at high risk for decompensation/readmission.    End-stage renal disease on HD MWF  History of left arm fistula  Of note - has not tolerated more aggressive fluid removal in the past with associated hypotension and A-fib with RVR. Cinacalcet stopped on previous admission.  Undergoing dialysis on 3/14.  Nephrology following, recommend to continue dialysis MWF.  Appreciate input.  May need to consider 4 times a week-? In future.     Acute exacerbation of heart failure with preserved EF.  Elevated troponin from volume overload, renal disease.  Type II MI.  Paroxysmal atrial fibrillation on chronic anticoagulation (Eliquis)   Essential hypertension.  Hyperlipidemia.  History of bradycardia  *TTE 1/21/2025 with LVEF of 60%.  No valvular abnormality   *RHC 3/5/25 showing moderately elevated right and left-sided filling pressure; elevated wedge pressure consistent with pulmonary hypertension  *Cardiology and EP followed on prior hospital stay (01/2025), required IV amiodarone at that time and transition to oral therapy.    At this time troponin 76 > 83.  proBNP 33, 144 (baseline 20s-40k).  --Cardiology reviewed chart on 3/11, recommended volume management through dialysis.  Appreciate input.  Dialysis as above.  Discontinued PTA Lasix 3/14 after okay by nephrology  Continue PTA amiodarone 200 mg twice daily.  Continue PTA apixaban 5 mg twice daily.  closely monitor hemoglobin levels.  Continue PTA hydralazine 75 mg every 8 hours.  Continue PTA Lipitor.  Telemetry monitoring.  Close cardiology follow-up as outpatient within 1 week on discharge.    Hyponatremia, hypochloremia.  Sodium 131, chloride 92 on 3/14.  Fluid removal with dialysis.  Monitor BMP in  AM.    Acute on chronic anxiety.  Depression.  Insomnia.  Continue PTA Zoloft.    Added as needed Seroquel twice daily.  QTc within normal limits.  As needed melatonin as needed for sleep.  Would avoid benzodiazepines if able too.   Can consider behavioral therapy as outpatient.    Status post blood transfusion  Acute on chronic anemia likely dilutional, no active blood loss.  Anemia of chronic disease, end-stage renal disease  Admission hemoglobin 7.0, baseline 7.0-8.0  -Hemoglobin dropped to 6.8 on 3/10, received 1 unit PRBC.  Now hemoglobin in 8 range.  -EPO per nephrology.  Monitor hemoglobin levels closely. Will need to continue Eliquis despite anemia given ([id number] - VASc 7).    S/p PEG tube 2/17/2025  Gastroesophageal reflux disease  Vague dysphagia intermittent oral pharynx upper esophagus  *Esophagram 3/4-normal   - Continue pantoprazole   Continue oral diet as recommended by speech therapy.    Continue tube feeds nocturnal-decreased to 10 hours at nighttime.  Recommend calorie count at TCU to wean off tube feeds if able to.     Type II DM, controlled with hemoglobin A1c of 6.5 [likely low in the setting of anemia]  Diabetic neuropathy  PTA regimen includes: PTA lantus 15 units bid (but discharged on 3/6 on 10 units BID)  Noted hyperglycemia.  Increase insulin Lantus to 15 units twice daily.  Medium intensity sliding scale insulin, monitor blood sugars and optimize regimen.  Hypoglycemia protocol in place.     Physical deconditioning from medical illness, senile frailty.  S/p left above-knee amputation, traumatic  Patient from TCU, will discharge back to TCU.  Encourage ambulation out of bed, per nursing report using the lift.  Fall precautions     Sacrococcygeal Pressure injury, Stage 3, present on admission  Wound care team followed.  Pressure injury prevention.     Obesity with a BMI of 34.  Increase all-cause mortality and morbidity.  Consider lifestyle modification with diet and exercise as able  "to    Clinically Significant Risk Factors     # Hypertension: Noted on problem list    # Chronic heart failure with preserved ejection fraction: heart failure noted on problem list and last echo with EF >50%    # Acute Hypercapnic Respiratory Failure: based on venous blood gas results.  Continue supplemental oxygen and ventilatory support as indicated.    # DMII: A1C = 6.5 % (Ref range: <5.7 %) within past 6 months   # Obesity: Estimated body mass index is 32.65 kg/m  as calculated from the following:    Height as of this encounter: 1.651 m (5' 5\").    Weight as of this encounter: 89 kg (196 lb 3.4 oz).        # Financial/Environmental Concerns: none       Orders Placed This Encounter      Combination Diet Regular Diet; Thin Liquids (level 0) (Upright position during and 60 minutes after oral intake. Slow pace with rest breaks as needed. Choose softer foods. Upper denture in place.)      DVT Prophylaxis: SCDs, on DOAC.  Code Status: No CPR- Pre-arrest intubation OK  Disposition: Expected discharge -likely Monday per SW awaiting Obra II assessment     Medically Ready for Discharge: Anticipated in 2-4 Days     Discussed with patient, bedside RN, care team  Updated patient's daughter over the telephone 3/14.  >35 minutes spent by me on the date of service doing chart review, history, exam, documentation & further activities per the note.      Cyril Ward MD        Interval History:        Patient lying in bed undergoing dialysis.  Has been compliant with CPAP overnight.    Patient denies any chest pain or palpitations.   No shortness of breath at rest.  No report of diarrhea or blood in the stools or blood in the urine.  Per report up with assist of 2, Lift  Telemetry sinus rhythm.         Physical Exam:        Physical Exam   Temp:  [98.1  F (36.7  C)-98.7  F (37.1  C)] 98.2  F (36.8  C)  Pulse:  [57-79] 66  Resp:  [10-30] 22  BP: ()/(27-51) 124/46  FiO2 (%):  [30 %] 30 %  SpO2:  [88 %-100 %] 100 " %    Intake/Output Summary (Last 24 hours) at 3/10/2025 1805  Last data filed at 3/10/2025 1530  Gross per 24 hour   Intake 410 ml   Output 3500 ml   Net -3090 ml       Admission Weight: 99.8 kg (220 lb)  Current Weight: 102 kg (224 lb 14.4 oz)    PHYSICAL EXAM  GENERAL: Patient is in no distress.    LUNGS: Bilateral decreased breath sounds, no wheezing  Respirations unlabored  NEURO: Moving all extremities.  EXTREMITIES: Pedal edema present.  SKIN: Warm, dry.  PSYCHIATRY cooperative.       Medications:        Current Facility-Administered Medications   Medication Dose Route Frequency Provider Last Rate Last Admin    - MEDICATION INSTRUCTIONS for Dialysis Patients -   Does not apply See Admin Instructions Cyril Ward MD        amiodarone (PACERONE) tablet 200 mg  200 mg Oral or Feeding Tube BID Celine Damon APRN CNP   200 mg at 03/14/25 0836    amLODIPine (NORVASC) tablet 10 mg  10 mg Oral or Feeding Tube Once per day on Sunday Tuesday Thursday Saturday Celine Damon APRN CNP   10 mg at 03/13/25 1024    apixaban ANTICOAGULANT (ELIQUIS) tablet 5 mg  5 mg Oral or Feeding Tube BID Celine Damon APRN CNP   5 mg at 03/14/25 0836    atorvastatin (LIPITOR) tablet 20 mg  20 mg Oral or Feeding Tube QPM Celine Damon APRN CNP   20 mg at 03/13/25 2049    B and C vitamin Complex with folic acid (NEPHRONEX) liquid 5 mL  5 mL Per Feeding Tube Daily Cyril Ward MD   5 mL at 03/14/25 0834    calcitRIOL (ROCALTROL) capsule 0.5 mcg  0.5 mcg Oral or Feeding Tube Once per day on Monday Wednesday Friday Celine Damon APRN CNP   0.5 mcg at 03/14/25 0835    cetirizine (zyrTEC) tablet 10 mg  10 mg Oral or Feeding Tube At Bedtime Celine Damon APRN CNP   10 mg at 03/13/25 2052    heparin (porcine) injection  500 Units Hemodialysis Machine OR IV Push Once in dialysis/CRRT Tian Lim MD        hydrALAZINE (APRESOLINE) tablet 75 mg  75 mg Oral Q8H Celine Damon APRN CNP   75 mg at  03/14/25 1437    insulin aspart (NovoLOG) injection (RAPID ACTING)  1-7 Units Subcutaneous TID  Celine Damon APRN CNP   1 Units at 03/14/25 0840    insulin aspart (NovoLOG) injection (RAPID ACTING)  1-5 Units Subcutaneous At Bedtime Celine Damon APRN CNP   1 Units at 03/11/25 2236    insulin glargine (LANTUS PEN) injection 15 Units  15 Units Subcutaneous BID Cyril Ward MD   15 Units at 03/14/25 0836    midodrine (PROAMATINE) tablet 5 mg  5 mg Oral or Feeding Tube Once per day on Monday Wednesday Friday Celine Damon APRN CNP   5 mg at 03/14/25 0834    pantoprazole (PROTONIX) 2 mg/mL suspension 40 mg  40 mg Per Feeding Tube QAM  Celine Damon APRN CNP   40 mg at 03/14/25 0839    sertraline (ZOLOFT) tablet 25 mg  25 mg Oral or Feeding Tube Daily Cyril Ward MD   25 mg at 03/14/25 0835    sertraline (ZOLOFT) tablet 50 mg  50 mg Oral or Feeding Tube Daily Cyril Ward MD   50 mg at 03/14/25 0835    sodium chloride (PF) 0.9% PF flush 3 mL  3 mL Intracatheter Q8H Celine Damon APRN CNP   3 mL at 03/14/25 0834    sodium chloride 0.9% BOLUS 200 mL  200 mL Hemodialysis Machine Once Tian Lim MD        sodium chloride 0.9% BOLUS 250 mL  250 mL Intravenous Once in dialysis/CRRT Tian Lim MD        sodium chloride 0.9% BOLUS 500 mL  500 mL Hemodialysis Machine Once Tian Lim MD        sodium chloride 0.9% BOLUS 500 mL  500 mL Hemodialysis Machine Once Tian Lim MD        wound support modular (EXPEDITE) bottle 60 mL  60 mL Oral Daily Cyril Ward MD   60 mL at 03/14/25 0837     Current Facility-Administered Medications   Medication Dose Route Frequency Provider Last Rate Last Admin    acetaminophen (TYLENOL) tablet 650 mg  650 mg Oral Q4H PRN Celine Damon APRN CNP   650 mg at 03/11/25 0937    albumin human 25 % injection 50 mL  50 mL Intravenous Q1H PRN Tian Lim MD   50 mL at 03/14/25 1214    albumin human 5 %  injection  mL   mL Intravenous Q1H PRN Tian Lim MD        albumin human 5 % injection  mL   mL Intravenous Q1H PRN Tian Lim MD        albuterol (PROVENTIL HFA/VENTOLIN HFA) inhaler  2 puff Inhalation Q6H PRN Celine Damon APRN CNP        calcium carbonate (TUMS) chewable tablet 1,000 mg  1,000 mg Oral 4x Daily PRN Celine Damon APRN CNP   1,000 mg at 03/11/25 0937    carboxymethylcellulose PF (REFRESH PLUS) 0.5 % ophthalmic solution 1 drop  1 drop Both Eyes Q1H PRN Kurt Escobar NP        dextrose 10% infusion   Intravenous Continuous PRN Cyril Ward MD        glucose gel 15-30 g  15-30 g Oral Q15 Min PRN Cyril Ward MD        Or    dextrose 50 % injection 25-50 mL  25-50 mL Intravenous Q15 Min PRN Cyril Ward MD        Or    glucagon injection 1 mg  1 mg Subcutaneous Q15 Min PRN Cyril Ward MD        ipratropium - albuterol 0.5 mg/2.5 mg/3 mL (DUONEB) neb solution 3 mL  3 mL Nebulization Q4H PRN Celine Damon APRN CNP        lidocaine (LMX4) cream   Topical Q1H PRN Kurt Escobar NP        lidocaine 1 % 0.1-1 mL  0.1-1 mL Other Q1H PRN Kurt Escoabr NP        lidocaine 1 % 0.5 mL  0.5 mL Intradermal Once PRN Tian Lim MD        lidocaine 1 % 0.5 mL  0.5 mL Intradermal Once PRN Tian Lim MD        melatonin tablet 3 mg  3 mg Oral At Bedtime PRN Cyril Ward MD   3 mg at 03/13/25 9962    No lozenges or gum should be given while patient on BIPAP/AVAPS/AVAPS AE   Does not apply Continuous PRN Kurt Escobar NP        olopatadine (PATANOL) 0.1 % ophthalmic solution 1 drop  1 drop Both Eyes BID PRN Celine Damon APRN CNP        ondansetron (ZOFRAN ODT) ODT tab 4 mg  4 mg Oral Q6H PRN Celine Damon APRN CNP        Or    ondansetron (ZOFRAN) injection 4 mg  4 mg Intravenous Q6H PRN Celine Damon APRN CNP   4 mg at 03/14/25 1019    Patient  is already receiving anticoagulation with heparin, enoxaparin (LOVENOX), warfarin (COUMADIN)  or other anticoagulant medication   Does not apply Continuous PRN Celine Damon APRN CNP        Patient may continue current oral medications   Does not apply Continuous PRN Kurt Escobar NP        QUEtiapine (SEROquel) half-tab 12.5 mg  12.5 mg Oral or Feeding Tube BID PRN Cyril Ward MD        senna-docusate (SENOKOT-S/PERICOLACE) 8.6-50 MG per tablet 1 tablet  1 tablet Oral BID PRN Celine Damon APRN CNP        Or    senna-docusate (SENOKOT-S/PERICOLACE) 8.6-50 MG per tablet 2 tablet  2 tablet Oral BID PRN Celine Damon APRN CNP        sevelamer carbonate (RENVELA) tablet 800 mg  800 mg Oral or Feeding Tube BID PRN Cyril Ward MD        sodium chloride (PF) 0.9% PF flush 3 mL  3 mL Intracatheter q1 min prn Celine Damon APRN CNP        sodium chloride 0.9% BOLUS 100-150 mL  100-150 mL Intravenous Q15 Min RADHAN Tian Lim MD        sodium chloride 0.9% BOLUS 100-150 mL  100-150 mL Intravenous Q15 Min Tian Jonas MD        Stop Heparin 60 minutes before end of treatment   Does not apply Continuous Tian Jonas MD        Stop Heparin 60 minutes before end of treatment   Does not apply Continuous Tian Jonas MD                Data:      All new lab and imaging data was reviewed.

## 2025-03-14 NOTE — PROGRESS NOTES
Renal Medicine Progress Note            Assessment/Plan:     # ESRD               -MWF               -LAVF               -[facility]     # HTN: BP is at target.   # IDDM  # Anemia  # Normal LV/RV function     Plan:  # Stop Lasix.   # Next HD tx is on Monday        Interval History:     Afebrile.   VSS.  Getting HD treatment.   Goal net UF ~ 3 liters.  She says she does not urinate much at all.           Medications and Allergies:     Current Facility-Administered Medications   Medication Dose Route Frequency Provider Last Rate Last Admin    - MEDICATION INSTRUCTIONS for Dialysis Patients -   Does not apply See Admin Instructions Cyril Ward MD        amiodarone (PACERONE) tablet 200 mg  200 mg Oral or Feeding Tube BID Celine Damon APRN CNP   200 mg at 03/14/25 0836    amLODIPine (NORVASC) tablet 10 mg  10 mg Oral or Feeding Tube Once per day on Sunday Tuesday Thursday Saturday Celine Damon APRN CNP   10 mg at 03/13/25 1024    apixaban ANTICOAGULANT (ELIQUIS) tablet 5 mg  5 mg Oral or Feeding Tube BID Celine Damon APRN CNP   5 mg at 03/14/25 0836    atorvastatin (LIPITOR) tablet 20 mg  20 mg Oral or Feeding Tube QPM Celine Damon APRN CNP   20 mg at 03/13/25 2049    B and C vitamin Complex with folic acid (NEPHRONEX) liquid 5 mL  5 mL Per Feeding Tube Daily Cyril Ward MD   5 mL at 03/14/25 0834    calcitRIOL (ROCALTROL) capsule 0.5 mcg  0.5 mcg Oral or Feeding Tube Once per day on Monday Wednesday Friday Celine Damon APRN CNP   0.5 mcg at 03/14/25 0835    cetirizine (zyrTEC) tablet 10 mg  10 mg Oral or Feeding Tube At Bedtime Celine Damon APRN CNP   10 mg at 03/13/25 2052    furosemide (LASIX) tablet 80 mg  80 mg Oral or Feeding Tube Daily Celine Damon APRN CNP   80 mg at 03/13/25 1024    heparin (porcine) injection  500 Units Hemodialysis Machine OR IV Push Once in dialysis/CRRT Tian Lim MD        hydrALAZINE (APRESOLINE) tablet 75 mg  75 mg  Oral Q8H Celine Damon APRN CNP   75 mg at 03/13/25 2049    insulin aspart (NovoLOG) injection (RAPID ACTING)  1-7 Units Subcutaneous TID AC Celine Damon APRN CNP   1 Units at 03/14/25 0840    insulin aspart (NovoLOG) injection (RAPID ACTING)  1-5 Units Subcutaneous At Bedtime Celine Damon APRN CNP   1 Units at 03/11/25 2236    insulin glargine (LANTUS PEN) injection 15 Units  15 Units Subcutaneous BID Cyril Ward MD   15 Units at 03/14/25 0836    midodrine (PROAMATINE) tablet 5 mg  5 mg Oral or Feeding Tube Once per day on Monday Wednesday Friday Celine Damon APRN CNP   5 mg at 03/14/25 0834    pantoprazole (PROTONIX) 2 mg/mL suspension 40 mg  40 mg Per Feeding Tube QAM  Celine Damon APRN CNP   40 mg at 03/14/25 0839    sertraline (ZOLOFT) tablet 25 mg  25 mg Oral or Feeding Tube Daily Cyril Ward MD   25 mg at 03/14/25 0835    sertraline (ZOLOFT) tablet 50 mg  50 mg Oral or Feeding Tube Daily Cyril Ward MD   50 mg at 03/14/25 0835    sodium chloride (PF) 0.9% PF flush 3 mL  3 mL Intracatheter Q8H Celine Damon APRN CNP   3 mL at 03/14/25 0834    sodium chloride 0.9% BOLUS 200 mL  200 mL Hemodialysis Machine Once Tian Lim MD        sodium chloride 0.9% BOLUS 250 mL  250 mL Intravenous Once in dialysis/CRRT Tian Lim MD        sodium chloride 0.9% BOLUS 500 mL  500 mL Hemodialysis Machine Once Tian Lim MD        sodium chloride 0.9% BOLUS 500 mL  500 mL Hemodialysis Machine Once Tian Lim MD        wound support modular (EXPEDITE) bottle 60 mL  60 mL Oral Daily Cyril Ward MD   60 mL at 03/14/25 0837        Allergies   Allergen Reactions    Ampicillin-Sulbactam Sodium Rash     No evidence SJS, but very uncomfortable and precipitated multiple provider visits. Would not use penicillins again if other options available.     Penicillins Rash            Physical Exam:   Vitals were reviewed   , Blood pressure 133/47,  "pulse 64, temperature 98.2  F (36.8  C), temperature source Oral, resp. rate 18, height 1.651 m (5' 5\"), weight 89 kg (196 lb 3.4 oz), SpO2 98%, not currently breastfeeding.    Wt Readings from Last 3 Encounters:   03/14/25 89 kg (196 lb 3.4 oz)   03/06/25 101.8 kg (224 lb 6.9 oz)   02/26/25 92.5 kg (203 lb 14.8 oz)       Intake/Output Summary (Last 24 hours) at 3/14/2025 0957  Last data filed at 3/14/2025 0900  Gross per 24 hour   Intake 991 ml   Output --   Net 991 ml     GENERAL APPEARANCE: NAD  HEENT:  Eyes/ears/nose/neck grossly normal  RESP:No wheezes.  CV: RRR  ABDOMEN: o/s/nt/nd, bs present  EXTREMITIES/SKIN:No edema. L AKA  NEURO: Awake, alert and answering questions           Data:     CBC RESULTS:     Recent Labs   Lab 03/13/25  0917 03/12/25  0603 03/11/25  0702 03/10/25  1630 03/10/25  0642 03/09/25  1050   WBC  --   --  5.8  --  5.2 5.6   RBC  --   --  2.83*  --  2.22* 2.31*   HGB 8.7* 8.3* 8.5* 8.3* 6.8* 7.0*   HCT  --   --  27.4*  --  21.9* 23.1*   PLT  --   --  170  --  143* 158       Basic Metabolic Panel:  Recent Labs   Lab 03/14/25  0807 03/14/25  0205 03/13/25  2210 03/13/25  1727 03/13/25  1135 03/13/25  0828 03/11/25  1140 03/11/25  0702 03/10/25  0934 03/10/25  0642 03/09/25  1759 03/09/25  1050   NA  --   --   --   --   --   --   --  137  --  138  --  135   POTASSIUM  --   --   --   --   --   --   --  4.0  --  4.7  --  4.8   CHLORIDE  --   --   --   --   --   --   --  97*  --  101  --  97*   CO2  --   --   --   --   --   --   --  29  --  26  --  27   BUN  --   --   --   --   --   --   --  26.2*  --  56.6*  --  47.6*   CR  --   --   --   --   --   --   --  2.89*  --  4.79*  --  3.78*   * 207* 146* 113* 167* 191*   < > 97   < > 87   < > 343*   JODY  --   --   --   --   --   --   --  9.7  --  9.9  --  9.9    < > = values in this interval not displayed.       INRNo lab results found in last 7 days.   Attestation:   I have reviewed today's relevant vital signs, notes, medications, labs and " imaging.    Tian Lim MD  [facility] - Nephrology  Office phone :[phone]  Pager: [phone]

## 2025-03-14 NOTE — PLAN OF CARE
A&O x4. Lethargic and very sleepy all morning after PRN ativan dose given around 0430.  Arouses but falls back asleep right away.  VSS on 2L O2 NC. Tele NSR. Turn/repo in bed, TAPS and pillows utilized.   LS diminished.   BS+. Flatus+ BM+ today.   Denies pain.  PI on coccyx/sacrum, red/pink, blanchable. Foam dressing in place.    Plan for CPAP use overnight, dialysis tomorrow. Potential discharge to TCU tomorrow.

## 2025-03-14 NOTE — PROGRESS NOTES
[id number]  Orientations: A&Ox 4. Forgetful. Calm and cooperative.   Vitals/Pain: VSS on 1 L NC- CPAP NOC. Denies pain.   Tele: SR  Lines/Drains: R PIV WDL. L AV fistula WDL. PEG tube  Skin/Wounds: reddened cocccyx with mepilex in place. Existing L AKA. Scattered bruising.   GI/: smear x2. No urine output, patient on HD. Regular diet with 1500 ml FR. Noc tube feeds 8 pm to 6 am.   Labs: Abnormal/Trends, Electrolyte Replacement- n/a  Ambulation/Assist: x2 lift. Turn and repo  Plan: dialysis today. Medically stable pending return to TCU- needs Obra level II test via the [facility]. Continue plan of care.

## 2025-03-14 NOTE — PLAN OF CARE
"Patient Name: Geronimo  MRN: [id number]  Date of Admission: 3/9/2025  Reason for Admission: Hypoxia   Level of Care: Medical     Vitals:   BP Readings from Last 1 Encounters:   03/13/25 129/44     Pulse Readings from Last 1 Encounters:   03/13/25 67     Wt Readings from Last 1 Encounters:   03/12/25 93.6 kg (206 lb 5.6 oz)     Ht Readings from Last 1 Encounters:   03/09/25 1.651 m (5' 5\")     Estimated body mass index is 34.34 kg/m  as calculated from the following:    Height as of this encounter: 1.651 m (5' 5\").    Weight as of this encounter: 93.6 kg (206 lb 5.6 oz).  Temp Readings from Last 1 Encounters:   03/13/25 98.1  F (36.7  C) (Oral)       Pain: Denies pain    CV Surgery Patient: No    Assessment    Resp: LS dim on 1L NC. Tolerated CPAP from 4653-5305.  Telemetry: SR  Neuro: A&O x4, forgetful   GI/: 2 smear Bms overnight. No UOP, on dialysis. Pt takes pills one at a time with applesauce and sips of water- no issues.  Diet: Regular, 1500 ml FR  Skin/Wounds: PI to buttocks/sacrum. R-arm skin tear. Scattered bruising. L-AKA.   Lines/Drains: 1 PIV, SL. L-arm fistula   Activity: Ax2 lift, turn and repo   Sleep: good   Abnormal Labs: No labs ordered this AM    Aggression Stop Light: Green          Patient Care Plan: Dialysis today. Continue plan of care   "

## 2025-03-15 LAB
ANION GAP SERPL CALCULATED.3IONS-SCNC: 9 MMOL/L (ref 7–15)
BUN SERPL-MCNC: 28.2 MG/DL (ref 8–23)
CALCIUM SERPL-MCNC: 10.5 MG/DL (ref 8.8–10.4)
CHLORIDE SERPL-SCNC: 94 MMOL/L (ref 98–107)
CREAT SERPL-MCNC: 2.72 MG/DL (ref 0.51–0.95)
EGFRCR SERPLBLD CKD-EPI 2021: 17 ML/MIN/1.73M2
GLUCOSE BLDC GLUCOMTR-MCNC: 128 MG/DL (ref 70–99)
GLUCOSE BLDC GLUCOMTR-MCNC: 149 MG/DL (ref 70–99)
GLUCOSE BLDC GLUCOMTR-MCNC: 153 MG/DL (ref 70–99)
GLUCOSE BLDC GLUCOMTR-MCNC: 174 MG/DL (ref 70–99)
GLUCOSE BLDC GLUCOMTR-MCNC: 94 MG/DL (ref 70–99)
GLUCOSE SERPL-MCNC: 216 MG/DL (ref 70–99)
HCO3 SERPL-SCNC: 30 MMOL/L (ref 22–29)
HGB BLD-MCNC: 8.6 G/DL (ref 11.7–15.7)
POTASSIUM SERPL-SCNC: 4.8 MMOL/L (ref 3.4–5.3)
SODIUM SERPL-SCNC: 133 MMOL/L (ref 135–145)

## 2025-03-15 PROCEDURE — 36415 COLL VENOUS BLD VENIPUNCTURE: CPT | Performed by: HOSPITALIST

## 2025-03-15 PROCEDURE — 85018 HEMOGLOBIN: CPT | Performed by: HOSPITALIST

## 2025-03-15 PROCEDURE — 120N000001 HC R&B MED SURG/OB

## 2025-03-15 PROCEDURE — 99232 SBSQ HOSP IP/OBS MODERATE 35: CPT

## 2025-03-15 PROCEDURE — 80048 BASIC METABOLIC PNL TOTAL CA: CPT | Performed by: HOSPITALIST

## 2025-03-15 PROCEDURE — 82310 ASSAY OF CALCIUM: CPT | Performed by: HOSPITALIST

## 2025-03-15 PROCEDURE — 250N000013 HC RX MED GY IP 250 OP 250 PS 637: Performed by: HOSPITALIST

## 2025-03-15 PROCEDURE — 250N000013 HC RX MED GY IP 250 OP 250 PS 637: Performed by: NURSE PRACTITIONER

## 2025-03-15 RX ADMIN — SERTRALINE HYDROCHLORIDE 25 MG: 25 TABLET ORAL at 09:00

## 2025-03-15 RX ADMIN — ATORVASTATIN CALCIUM 20 MG: 20 TABLET, FILM COATED ORAL at 20:55

## 2025-03-15 RX ADMIN — SERTRALINE HYDROCHLORIDE 50 MG: 50 TABLET ORAL at 09:01

## 2025-03-15 RX ADMIN — Medication 40 MG: at 08:59

## 2025-03-15 RX ADMIN — HYDRALAZINE HYDROCHLORIDE 75 MG: 50 TABLET ORAL at 13:10

## 2025-03-15 RX ADMIN — HYDRALAZINE HYDROCHLORIDE 75 MG: 50 TABLET ORAL at 06:24

## 2025-03-15 RX ADMIN — HYDRALAZINE HYDROCHLORIDE 75 MG: 50 TABLET ORAL at 21:02

## 2025-03-15 RX ADMIN — APIXABAN 5 MG: 5 TABLET, FILM COATED ORAL at 09:00

## 2025-03-15 RX ADMIN — APIXABAN 5 MG: 5 TABLET, FILM COATED ORAL at 20:56

## 2025-03-15 RX ADMIN — AMIODARONE HYDROCHLORIDE 200 MG: 200 TABLET ORAL at 09:00

## 2025-03-15 RX ADMIN — AMIODARONE HYDROCHLORIDE 200 MG: 200 TABLET ORAL at 20:58

## 2025-03-15 RX ADMIN — AMLODIPINE BESYLATE 10 MG: 10 TABLET ORAL at 09:01

## 2025-03-15 RX ADMIN — Medication 5 ML: at 08:59

## 2025-03-15 RX ADMIN — CETIRIZINE HYDROCHLORIDE 10 MG: 10 TABLET, FILM COATED ORAL at 21:03

## 2025-03-15 RX ADMIN — Medication 60 ML: at 09:00

## 2025-03-15 ASSESSMENT — ACTIVITIES OF DAILY LIVING (ADL)
ADLS_ACUITY_SCORE: 80
ADLS_ACUITY_SCORE: 82
ADLS_ACUITY_SCORE: 82
ADLS_ACUITY_SCORE: 80
ADLS_ACUITY_SCORE: 82
ADLS_ACUITY_SCORE: 83
ADLS_ACUITY_SCORE: 80
ADLS_ACUITY_SCORE: 81
ADLS_ACUITY_SCORE: 80
ADLS_ACUITY_SCORE: 81
ADLS_ACUITY_SCORE: 80
ADLS_ACUITY_SCORE: 84
ADLS_ACUITY_SCORE: 82
ADLS_ACUITY_SCORE: 80
ADLS_ACUITY_SCORE: 80
ADLS_ACUITY_SCORE: 82
ADLS_ACUITY_SCORE: 83
ADLS_ACUITY_SCORE: 82
ADLS_ACUITY_SCORE: 80

## 2025-03-15 NOTE — PROGRESS NOTES
Shift: 03/15/2025 [id number]  Summary: hypoxia     Orientation: A&O x4  Vitals/Tele: HTN. All other VSS on 1L O2 NC. A fib at times.   IV Access/drains: PEG tube. PIV SL.   Diet: Regular. Refused all meals today.   Mobility: T/R   GI/: Bedpan for B/B. Call light appropriate   Wound/Skin: Generalized bruising, ROXANA, L AKA, L fistula bruits present, skin rashes - look at LDA.   Other: denies pain this shift, MG and Phos protocols,   Consults: Pharmacy.     Discharge Plan: pending       See Flow sheets for assessment

## 2025-03-15 NOTE — PROGRESS NOTES
[facility]  Hospitalist Progress Note   03/15/2025          Assessment and Plan:       Supriya Herr is a 75 year old female with history of ESRD on hemodialysis, heart failure with preserved EF, COPD, JONE, chronic anemia, hypertension, hyperlipidemia, poor mobility due to left AKA, obesity, anxiety, depression, former nicotine use admitted on 3/9/2025 for shortness of breath.    *Hospitalized at [facility] from 3/2 - 3/6/2025 with shortness of breath likely multifactorial.  Volume status was difficult to determine, underwent right heart cath on 3/5 showed mildly elevated right atrial and PCWP readings.  Was treated for acute respiratory failure likely from heart failure exacerbation, atrial fibrillation, end-stage renal disease and discharged to care facility with supplemental nocturnal oxygen.  *Prolonged hospitalized at [facility] from 1/8 to 2/26/2025 for acute hypoxic respiratory failure multifactorial, was intubated ( 1/9-1/18), reintubated (1/20 - 1/25).  Then was treated for shock with pressors, influenza A pneumonia, hospital-acquired pneumonia with intravenous antibiotics, steroids, antivirals.  Then also noted to have heart failure exacerbation, recurrent A-fib with RVR, subsequent bradycardia.  Then also noted to have encephalopathy likely from infectious, metabolic etiology and delirium.  During that hospitalization was noted to have dysphagia, pharyngeal edema, was evaluated by ENT on 2/16.  No findings to explain dysphagia.  G-tube was placed by IR on 2/17 and was on nocturnal tube feeds.  Discharged to TCU for rehabilitation.      Acute hypoxic respiratory failure multifactorial -likely from heart failure exacerbation, ESRD on hemodialysis, pulmonary hypertension, JONE not compliant with CPAP, physical deconditioning, recent pneumonia, anxiety related, anemia.  On 3/9, the patient reported shortness of breath at the TCU staff, felt nauseated and ultimately was found to be hypoxic in the low 80s  prompting transfer to ER.  Weights have been extremely variable based on documentation, EDW is predicted to be approximately 92.5 kg.  O2 sats are variable depending on anxiety/underlying issues.  *CXR 3/9/2025 showing bilateral interstitial and alveolar opacities, small pleural effusions, mild cardiomegaly, findings suggestive of pulmonary edema and volume overload.  --Patient was admitted to [facility], underwent dialysis, intermittent BiPAP support, decreased tube feeding, free water flushes, was on fluid restriction with which patient having improvement in shortness of breath.  IMC status discontinued 3/11.    --Weaned off from Bipap and has been using CPAP for naps/nighttime  -- Continue CPAP with naps/nighttime.  Encourage use.  -- Supplemental nasal oxygen during day as needed.   -- Pulmonology followed, recommend CPAP on discharge (settings in pulm note)  -- Nephrology followed, continue dialysis MWF, prior to admission Lasix discontinued ineffective.  Appreciate input.  -- Cardiology reviewed patient's chart, continue cardiac meds.  Appreciate input   -- Decreased tube feeding to 65 mL/h for 10 hours.  Decreasedree water flushes to 15 mL before and after medication, free water to 30 mL before and after tube feeds.  Can further decrease tube feeds if oral intake improves.  Monitor electrolytes closely.  --Avoid benzodiazepines.  Of note patient was drowsy on 3/13 after receiving one-time dose of Ativan.   --Address medical issues as discussed  -- 1500 mL fluid restriction.  Strict intake output monitoring.  Daily weights.  -- Aggressive incentive spirometry.  Encourage ambulation as able to tolerate.  -- Feeding with aspiration precautions.  --Close follow-up imaging of chest in few weeks for resolution.  -follow up with sleep provider to assess need for BiPAP as outpatient in 1-2 months      Goals of Care  -- Have discussed with patient, daughter goals of care during this hospitalization.  Patient would like to  continue restorative care and live as long as able to.  Revisited goals of care discussion with patient's daughter -  continue no CPR, prearrest intubation okay.  Elected for restorative care.  Patient and family declined palliative evaluation.  Patient at high risk for decompensation/readmission.    End-stage renal disease on HD MWF  History of left arm fistula  Of note - has not tolerated more aggressive fluid removal in the past with associated hypotension and A-fib with RVR. Cinacalcet stopped on previous admission.  Undergoing dialysis on 3/14.  Nephrology following, recommend to continue dialysis MWF.  Appreciate input.  May need to consider 4 times a week-? In future.     Acute exacerbation of heart failure with preserved EF.  Elevated troponin from volume overload, renal disease.  Type II MI.  Paroxysmal atrial fibrillation on chronic anticoagulation (Eliquis)   Essential hypertension.  Hyperlipidemia.  History of bradycardia  *TTE 1/21/2025 with LVEF of 60%.  No valvular abnormality   *RHC 3/5/25 showing moderately elevated right and left-sided filling pressure; elevated wedge pressure consistent with pulmonary hypertension  *Cardiology and EP followed on prior hospital stay (01/2025), required IV amiodarone at that time and transition to oral therapy.    At this time troponin 76 > 83.  proBNP 33, 144 (baseline 20s-40k).  --Cardiology reviewed chart on 3/11, recommended volume management through dialysis.  Appreciate input.  Dialysis as above.  Discontinued PTA Lasix 3/14 after okay by nephrology  Continue PTA amiodarone 200 mg twice daily.  Continue PTA apixaban 5 mg twice daily.  closely monitor hemoglobin levels.  Continue PTA hydralazine 75 mg every 8 hours.  Continue PTA Lipitor.  Telemetry monitoring.  Close cardiology follow-up as outpatient within 1 week on discharge.    Hyponatremia, hypochloremia.  Sodium 131, chloride 92 on 3/14.  Fluid removal with dialysis.  Monitor BMP in AM.    Acute on  chronic anxiety.  Depression.  Insomnia.  Continue PTA Zoloft.    Added as needed Seroquel twice daily.  QTc within normal limits.  As needed melatonin as needed for sleep.  Would avoid benzodiazepines if able too.   Can consider behavioral therapy as outpatient.    Status post blood transfusion  Acute on chronic anemia likely dilutional, no active blood loss.  Anemia of chronic disease, end-stage renal disease  Admission hemoglobin 7.0, baseline 7.0-8.0  -Hemoglobin dropped to 6.8 on 3/10, received 1 unit PRBC.  Now hemoglobin in 8 range.  -EPO per nephrology.  Monitor hemoglobin levels closely. Will need to continue Eliquis despite anemia given (RDC1TA4 - VASc 7).    S/p PEG tube 2/17/2025  Gastroesophageal reflux disease  Vague dysphagia intermittent oral pharynx upper esophagus  *Esophagram 3/4-normal   - Continue pantoprazole   Continue oral diet as recommended by speech therapy.    Continue tube feeds nocturnal-decreased to 10 hours at nighttime.  Recommend calorie count at TCU to wean off tube feeds if able to.     Type II DM, controlled with hemoglobin A1c of 6.5 [likely low in the setting of anemia]  Diabetic neuropathy  PTA regimen includes: PTA lantus 15 units bid (but discharged on 3/6 on 10 units BID)  Noted hyperglycemia.  Increase insulin Lantus to 15 units twice daily.  Medium intensity sliding scale insulin, monitor blood sugars and optimize regimen.  Hypoglycemia protocol in place.     Physical deconditioning from medical illness, senile frailty.  S/p left above-knee amputation, traumatic  Patient from TCU, will discharge back to TCU.  Encourage ambulation out of bed, per nursing report using the lift.  Fall precautions     Sacrococcygeal Pressure injury, Stage 3, present on admission  Wound care team followed.  Pressure injury prevention.     Obesity with a BMI of 34.  Increase all-cause mortality and morbidity.  Consider lifestyle modification with diet and exercise as able to    Clinically  "Significant Risk Factors     # Hypertension: Noted on problem list    # Chronic heart failure with preserved ejection fraction: heart failure noted on problem list and last echo with EF >50%    # Acute Hypercapnic Respiratory Failure: based on venous blood gas results.  Continue supplemental oxygen and ventilatory support as indicated.    # DMII: A1C = 6.5 % (Ref range: <5.7 %) within past 6 months   # Obesity: Estimated body mass index is 32.65 kg/m  as calculated from the following:    Height as of this encounter: 1.651 m (5' 5\").    Weight as of this encounter: 89 kg (196 lb 3.4 oz).        # Financial/Environmental Concerns: none       Orders Placed This Encounter      Combination Diet Regular Diet; Thin Liquids (level 0) (Upright position during and 60 minutes after oral intake. Slow pace with rest breaks as needed. Choose softer foods. Upper denture in place.)      DVT Prophylaxis: SCDs, on DOAC.  Code Status: No CPR- Pre-arrest intubation OK  Disposition: Expected discharge -likely Monday per SW awaiting Obra II assessment     Medically Ready for Discharge: Ready Now, discharge pending to TCU    Jimi Oliveira MD        Interval History:      No acute concerns, no shortness of breath, uses CPAP at night without issues.              Physical Exam:        Physical Exam   Temp:  [98.2  F (36.8  C)-98.6  F (37  C)] 98.6  F (37  C)  Pulse:  [57-79] 69  Resp:  [10-30] 22  BP: ()/(27-52) 165/52  SpO2:  [88 %-100 %] 95 %    Intake/Output Summary (Last 24 hours) at 3/10/2025 1805  Last data filed at 3/10/2025 1530  Gross per 24 hour   Intake 410 ml   Output 3500 ml   Net -3090 ml       Admission Weight: 99.8 kg (220 lb)  Current Weight: 102 kg (224 lb 14.4 oz)    PHYSICAL EXAM  GENERAL: Patient is in no distress.    LUNGS: Bilateral decreased breath sounds, no wheezing, on 1L NC  Respirations unlabored  NEURO: Moving all extremities.  EXTREMITIES: Pedal edema present.  SKIN: Warm, dry.  PSYCHIATRY cooperative.    "    Medications:        Current Facility-Administered Medications   Medication Dose Route Frequency Provider Last Rate Last Admin    - MEDICATION INSTRUCTIONS for Dialysis Patients -   Does not apply See Admin Instructions Cyril Ward MD        amiodarone (PACERONE) tablet 200 mg  200 mg Oral or Feeding Tube BID Celine Damon APRN CNP   200 mg at 03/14/25 2024    amLODIPine (NORVASC) tablet 10 mg  10 mg Oral or Feeding Tube Once per day on Sunday Tuesday Thursday Saturday Celine Damon APRN CNP   10 mg at 03/13/25 1024    apixaban ANTICOAGULANT (ELIQUIS) tablet 5 mg  5 mg Oral or Feeding Tube BID Celine Damon APRN CNP   5 mg at 03/14/25 2024    atorvastatin (LIPITOR) tablet 20 mg  20 mg Oral or Feeding Tube QPM Celine Damon APRN CNP   20 mg at 03/14/25 2024    B and C vitamin Complex with folic acid (NEPHRONEX) liquid 5 mL  5 mL Per Feeding Tube Daily Cyril Ward MD   5 mL at 03/14/25 0834    calcitRIOL (ROCALTROL) capsule 0.5 mcg  0.5 mcg Oral or Feeding Tube Once per day on Monday Wednesday Friday Celine Damon APRN CNP   0.5 mcg at 03/14/25 0835    cetirizine (zyrTEC) tablet 10 mg  10 mg Oral or Feeding Tube At Bedtime Celine Damon APRN CNP   10 mg at 03/14/25 2144    heparin (porcine) injection  500 Units Hemodialysis Machine OR IV Push Once in dialysis/CRRT Tian Lim MD        hydrALAZINE (APRESOLINE) tablet 75 mg  75 mg Oral Q8H Celine Damon APRN CNP   75 mg at 03/15/25 0624    insulin aspart (NovoLOG) injection (RAPID ACTING)  1-7 Units Subcutaneous TID AC Celine Damon APRN CNP   1 Units at 03/14/25 1808    insulin aspart (NovoLOG) injection (RAPID ACTING)  1-5 Units Subcutaneous At Bedtime Celine Damon APRN CNP   1 Units at 03/11/25 2236    insulin glargine (LANTUS PEN) injection 15 Units  15 Units Subcutaneous BID Cyril Ward MD   15 Units at 03/14/25 2033    midodrine (PROAMATINE) tablet 5 mg  5 mg Oral or Feeding Tube  Once per day on Monday Wednesday Friday Celine Damon APRN CNP   5 mg at 03/14/25 0834    pantoprazole (PROTONIX) 2 mg/mL suspension 40 mg  40 mg Per Feeding Tube QAM AC Celine Damon APRN CNP   40 mg at 03/14/25 0839    sertraline (ZOLOFT) tablet 25 mg  25 mg Oral or Feeding Tube Daily Cyril Ward MD   25 mg at 03/14/25 0835    sertraline (ZOLOFT) tablet 50 mg  50 mg Oral or Feeding Tube Daily Cyril Ward MD   50 mg at 03/14/25 0835    sodium chloride (PF) 0.9% PF flush 3 mL  3 mL Intracatheter Q8H Celine Damon APRN CNP   3 mL at 03/14/25 1808    wound support modular (EXPEDITE) bottle 60 mL  60 mL Oral Daily Cyril Ward MD   60 mL at 03/14/25 0837     Current Facility-Administered Medications   Medication Dose Route Frequency Provider Last Rate Last Admin    acetaminophen (TYLENOL) tablet 650 mg  650 mg Oral Q4H PRN Celine Damon APRN CNP   650 mg at 03/11/25 0937    albuterol (PROVENTIL HFA/VENTOLIN HFA) inhaler  2 puff Inhalation Q6H PRN Celine Damon APRN CNP        calcium carbonate (TUMS) chewable tablet 1,000 mg  1,000 mg Oral 4x Daily PRN Celine Damon APRN CNP   1,000 mg at 03/11/25 0937    carboxymethylcellulose PF (REFRESH PLUS) 0.5 % ophthalmic solution 1 drop  1 drop Both Eyes Q1H PRN Kurt Escobar NP        dextrose 10% infusion   Intravenous Continuous PRN Cyril Ward MD        glucose gel 15-30 g  15-30 g Oral Q15 Min PRN Cyril Ward MD        Or    dextrose 50 % injection 25-50 mL  25-50 mL Intravenous Q15 Min PRN Cyril Ward MD        Or    glucagon injection 1 mg  1 mg Subcutaneous Q15 Min PRN Cyril Ward MD        ipratropium - albuterol 0.5 mg/2.5 mg/3 mL (DUONEB) neb solution 3 mL  3 mL Nebulization Q4H PRN Celine Damon APRN CNP        lidocaine (LMX4) cream   Topical Q1H PRN Kurt Escobar, NP        lidocaine 1 % 0.1-1 mL  0.1-1 mL Other Q1H PRN Kurt Escobar  KEIRA Gunn        melatonin tablet 3 mg  3 mg Oral At Bedtime PRN Cyril Ward MD   3 mg at 03/13/25 2215    No lozenges or gum should be given while patient on BIPAP/AVAPS/AVAPS AE   Does not apply Continuous PRN Kurt Escobar NP        olopatadine (PATANOL) 0.1 % ophthalmic solution 1 drop  1 drop Both Eyes BID PRN Celine Damon APRN CNP        ondansetron (ZOFRAN ODT) ODT tab 4 mg  4 mg Oral Q6H PRN Celine Damon APRN CNP        Or    ondansetron (ZOFRAN) injection 4 mg  4 mg Intravenous Q6H PRN Celine Damon APRN CNP   4 mg at 03/14/25 1019    Patient is already receiving anticoagulation with heparin, enoxaparin (LOVENOX), warfarin (COUMADIN)  or other anticoagulant medication   Does not apply Continuous PRN Celine Damon APRN CNP        Patient may continue current oral medications   Does not apply Continuous PRN Kurt Escobar NP        QUEtiapine (SEROquel) half-tab 12.5 mg  12.5 mg Oral or Feeding Tube BID PRN Cyril Ward MD        senna-docusate (SENOKOT-S/PERICOLACE) 8.6-50 MG per tablet 1 tablet  1 tablet Oral BID PRN Celine Damon APRN CNP        Or    senna-docusate (SENOKOT-S/PERICOLACE) 8.6-50 MG per tablet 2 tablet  2 tablet Oral BID PRN Celine Damon APRN CNP        sevelamer carbonate (RENVELA) tablet 800 mg  800 mg Oral or Feeding Tube BID PRN Cyril Ward MD        sodium chloride (PF) 0.9% PF flush 3 mL  3 mL Intracatheter q1 min prn Celine Damon APRN CNP   3 mL at 03/15/25 0013            Data:      All new lab and imaging data was reviewed.

## 2025-03-15 NOTE — PLAN OF CARE
"Goal Outcome Evaluation:       Patient Name: Geronimo  MRN: [id number]  Date of Admission: 3/9/2025        Vitals:   BP Readings from Last 1 Encounters:   03/14/25 122/40     Pulse Readings from Last 1 Encounters:   03/14/25 69     Wt Readings from Last 1 Encounters:   03/14/25 89 kg (196 lb 3.4 oz)     Ht Readings from Last 1 Encounters:   03/09/25 1.651 m (5' 5\")     Estimated body mass index is 32.65 kg/m  as calculated from the following:    Height as of this encounter: 1.651 m (5' 5\").    Weight as of this encounter: 89 kg (196 lb 3.4 oz).  Temp Readings from Last 1 Encounters:   03/14/25 98.6  F (37  C) (Oral)       Pain: Pain goal 0/10 Pain Rating 0/10  CV Surgery Patient: No  Assessment  Resp: LS clear but diminished, on 1L NC and CIPAP overnight.   Telemetry: NSR  Neuro: A&Ox4  GI/: BS active, x0 BM  Skin/Wounds: scattered bruising. Blanchable redness on button and coccyx.  Lines/Drains: 1 PIV SL. PEG tube, feeding initiated from 2000 -0600.  Activity: stayed in bed throughout shift with intermittent repositioning  Aggression Stop Light: Green     Patient Care Plan: Pt tolerated CIPAP overnight for about 5 hours. Plan of care ongoing, call light within reach.                 "

## 2025-03-16 PROBLEM — G47.09 OTHER INSOMNIA: Chronic | Status: ACTIVE | Noted: 2025-03-16

## 2025-03-16 PROBLEM — I27.20 PULMONARY HYPERTENSION (H): Chronic | Status: ACTIVE | Noted: 2025-03-16

## 2025-03-16 LAB
GLUCOSE BLDC GLUCOMTR-MCNC: 116 MG/DL (ref 70–99)
GLUCOSE BLDC GLUCOMTR-MCNC: 130 MG/DL (ref 70–99)
GLUCOSE BLDC GLUCOMTR-MCNC: 146 MG/DL (ref 70–99)
GLUCOSE BLDC GLUCOMTR-MCNC: 199 MG/DL (ref 70–99)
GLUCOSE BLDC GLUCOMTR-MCNC: 215 MG/DL (ref 70–99)

## 2025-03-16 PROCEDURE — 250N000013 HC RX MED GY IP 250 OP 250 PS 637: Performed by: HOSPITALIST

## 2025-03-16 PROCEDURE — 120N000001 HC R&B MED SURG/OB

## 2025-03-16 PROCEDURE — 999N000157 HC STATISTIC RCP TIME EA 10 MIN

## 2025-03-16 PROCEDURE — 99232 SBSQ HOSP IP/OBS MODERATE 35: CPT

## 2025-03-16 PROCEDURE — 250N000013 HC RX MED GY IP 250 OP 250 PS 637: Performed by: NURSE PRACTITIONER

## 2025-03-16 PROCEDURE — 94660 CPAP INITIATION&MGMT: CPT

## 2025-03-16 RX ORDER — AMIODARONE HYDROCHLORIDE 200 MG/1
200 TABLET ORAL 2 TIMES DAILY
Status: ON HOLD | DISCHARGE
Start: 2025-03-16

## 2025-03-16 RX ORDER — ATORVASTATIN CALCIUM 20 MG/1
20 TABLET, FILM COATED ORAL EVERY EVENING
Status: ON HOLD | DISCHARGE
Start: 2025-03-16

## 2025-03-16 RX ORDER — AMLODIPINE BESYLATE 10 MG/1
10 TABLET ORAL DAILY
Status: ON HOLD | DISCHARGE
Start: 2025-03-16

## 2025-03-16 RX ORDER — MIDODRINE HYDROCHLORIDE 5 MG/1
5 TABLET ORAL
Status: ON HOLD | DISCHARGE
Start: 2025-03-17

## 2025-03-16 RX ADMIN — APIXABAN 5 MG: 5 TABLET, FILM COATED ORAL at 20:55

## 2025-03-16 RX ADMIN — Medication 40 MG: at 06:41

## 2025-03-16 RX ADMIN — AMLODIPINE BESYLATE 10 MG: 10 TABLET ORAL at 08:57

## 2025-03-16 RX ADMIN — HYDRALAZINE HYDROCHLORIDE 75 MG: 50 TABLET ORAL at 13:10

## 2025-03-16 RX ADMIN — SERTRALINE HYDROCHLORIDE 50 MG: 50 TABLET ORAL at 08:55

## 2025-03-16 RX ADMIN — AMIODARONE HYDROCHLORIDE 200 MG: 200 TABLET ORAL at 08:55

## 2025-03-16 RX ADMIN — SERTRALINE HYDROCHLORIDE 25 MG: 25 TABLET ORAL at 08:55

## 2025-03-16 RX ADMIN — ATORVASTATIN CALCIUM 20 MG: 20 TABLET, FILM COATED ORAL at 20:54

## 2025-03-16 RX ADMIN — HYDRALAZINE HYDROCHLORIDE 75 MG: 50 TABLET ORAL at 05:10

## 2025-03-16 RX ADMIN — HYDRALAZINE HYDROCHLORIDE 75 MG: 50 TABLET ORAL at 20:54

## 2025-03-16 RX ADMIN — Medication 5 ML: at 08:54

## 2025-03-16 RX ADMIN — APIXABAN 5 MG: 5 TABLET, FILM COATED ORAL at 08:55

## 2025-03-16 RX ADMIN — AMIODARONE HYDROCHLORIDE 200 MG: 200 TABLET ORAL at 20:55

## 2025-03-16 RX ADMIN — CETIRIZINE HYDROCHLORIDE 10 MG: 10 TABLET, FILM COATED ORAL at 22:36

## 2025-03-16 ASSESSMENT — ACTIVITIES OF DAILY LIVING (ADL)
ADLS_ACUITY_SCORE: 87
ADLS_ACUITY_SCORE: 88
ADLS_ACUITY_SCORE: 87
ADLS_ACUITY_SCORE: 87
ADLS_ACUITY_SCORE: 88
ADLS_ACUITY_SCORE: 87
ADLS_ACUITY_SCORE: 88
ADLS_ACUITY_SCORE: 87
ADLS_ACUITY_SCORE: 88
ADLS_ACUITY_SCORE: 87
ADLS_ACUITY_SCORE: 86
ADLS_ACUITY_SCORE: 88
ADLS_ACUITY_SCORE: 88
ADLS_ACUITY_SCORE: 87

## 2025-03-16 NOTE — PLAN OF CARE
Summary: [id number]  Primary Diagnosis: Acute Hypoxic Respiratory Failure     Orientation: A&Ox4, pleasant and interactive, can be forgetful at times  Aggression Stop Light: Green  Activity: 2A w/lift, Q2hrs T&R  Diet/BS Checks: TF initiated at 8PM and stopped at 6AM, on regular diet with FR 1500ml/day  Tele:  NSR  IV Access/Drains: PEG tube, L AV Fistula and R PIV SL  Pain Management: Denied  Abnormal VS/Results: VSS ex HTN, utilized CPAP at night  Bowel/Bladder: 1 Loose BM, no urine output  Skin/Wounds: Sacral wound with mepi in place, abrasion to RAC, scattered bruising  Consults: Nephrology  D/C Disposition: Pending TCU availability  Other Info:   -Pt has existing L AKA  -Takes her meds through the G tube

## 2025-03-16 NOTE — PROGRESS NOTES
Care Management Follow Up    Length of Stay (days): 7    Expected Discharge Date: 03/17/2025     Concerns to be Addressed: discharge planning     Patient plan of care discussed at interdisciplinary rounds: Yes    Anticipated Discharge Disposition: Transitional Care              Anticipated Discharge Services:    Anticipated Discharge DME:      Patient/family educated on Medicare website which has current facility and service quality ratings: no  Education Provided on the Discharge Plan: Yes  Patient/Family in Agreement with the Plan: yes    Referrals Placed by CM/SW: Post Acute Facilities  Private pay costs discussed: Not applicable    Discussed  Partnership in Safe Discharge Planning  document with patient/family: No     Handoff Completed: No, handoff not indicated or clinically appropriate    Additional Information:  Delay in discharge caused for three reasons.   #1 A PAS was submitted for this person which triggered a Level 2 screening which needs to be completed by [facility] staff or her insurance CM prior to patients discharge.   The PAS referral was made by mistake, as a PAS is not needed. Patient came from a TCU and prior to her discharge from here on 3/6/25 the PAS/Level 2 were completed. A patient only needs one screening done as long as they have not discharged home from the TCU.     #2 The second delay is due to [facility] not accepting any w/e admissions.  Writer spoke with the nursing supervisor on Saturday and asked the facility to make an exception since they are holding a bed for patient however he strongly declined.     # 3 Patient requires a Cpap for use at the TCU.  This needs to be arranged for prior to returning. The nursing supervisor states the DME company they use ([employer]) doesn't provide delivery and set up on the weekend.  Next Steps:     On Monday  staff will   contact the [employer], explain the mistake and ask if the Level 2 request can be cancelled.   Speak  with [facility] to coordinate her return.     Jillian Gill, [facility] CareSW

## 2025-03-16 NOTE — PROGRESS NOTES
CPAP/BiPAP Settings  CPAP of 10  CPAP/BiPAP/AVAPS/AVAPS AE Alarms  High Pressure: 25  Low Pressure: 5  Low Pressure Delay: 20  High Rate (breaths/min): 45  Alarm Volume Level: 10    CPAP/BiPAP/AVAPS/AVAPS AE Patient Assessment  Skin Assessment: Intact  Barriers Applied: Applied    Plan: Tolerating CPAP well. RT will continue to follow.     Annetta Westfall, RT.

## 2025-03-16 NOTE — PROGRESS NOTES
3/15/2025 [id number]  Pt transferred from [facility]. Settled pt.     Summary:  Came from rehab center with SOB, nausea and elevated Bp. Found to have Acute hypoxic respiratory failure, pulmonary edema  Cognitive Concerns/ Orientation : Alert and oriented x2-4   BEHAVIOR & AGGRESSION TOOL COLOR: Green  ABNL VS/O2: Patient on Bipap last night,  MOBILITY: Assist x2 with lift,T&R, Total care, not out of bed this shift  PAIN MANAGMENT: Denies   DIET: NOC TF /  Regular diet.   BOWEL/BLADDER: Incontinent of Bowel and bladder.   ABNL LAB/BG:   DRAIN/DEVICES: PIV SL, PEG tube clamped  SKIN: PI to Buttock coccyx area, scattered bruises , L AV fistula Positive bruit and thrill  TESTS/PROCEDURES:   D/C DAY/GOALS/PLACE:   OTHER IMPORTANT INFO: Dialysis MWF, K / Mag / Phos protocols

## 2025-03-16 NOTE — PLAN OF CARE
Goal Outcome Evaluation:    Plan of Care Reviewed With: patient    Overall Patient Progress: no change    Orientation: A&O x4, intermittent confusion at times.   Aggression Stop Light: Green  Activity: Ax2 w/ lift  Diet/BS Checks: Regular, thin liquids and FR 1500. BG ACHS   Tele: discontinued   IV Access/Drains: 1 PIV SL, PEG tube clamped, L AV fistula  Pain Management: Denies pain.  Abnormal VS/Results: VSS on 3L o2 NC except HTN at times. CPAP overnight.   Bowel/Bladder: 1 BM using bed pan. Anuric pt on HD.  Skin/Wounds: Scattered bruising, sacral wound mepilex changed.   Consults: Neph, SW  D/C Disposition: pending TCU bed  Other Info: Pt has dialysis MWF.

## 2025-03-16 NOTE — PROGRESS NOTES
[facility]  Hospitalist Progress Note   03/16/2025          Assessment and Plan:       Supriya Herr is a 75 year old female with history of ESRD on hemodialysis, heart failure with preserved EF, COPD, JONE, chronic anemia, hypertension, hyperlipidemia, poor mobility due to left AKA, obesity, anxiety, depression, former nicotine use admitted on 3/9/2025 for shortness of breath.    *Hospitalized at [facility] from 3/2 - 3/6/2025 with shortness of breath likely multifactorial.  Volume status was difficult to determine, underwent right heart cath on 3/5 showed mildly elevated right atrial and PCWP readings.  Was treated for acute respiratory failure likely from heart failure exacerbation, atrial fibrillation, end-stage renal disease and discharged to care facility with supplemental nocturnal oxygen.  *Prolonged hospitalized at [facility] from 1/8 to 2/26/2025 for acute hypoxic respiratory failure multifactorial, was intubated ( 1/9-1/18), reintubated (1/20 - 1/25).  Then was treated for shock with pressors, influenza A pneumonia, hospital-acquired pneumonia with intravenous antibiotics, steroids, antivirals.  Then also noted to have heart failure exacerbation, recurrent A-fib with RVR, subsequent bradycardia.  Then also noted to have encephalopathy likely from infectious, metabolic etiology and delirium.  During that hospitalization was noted to have dysphagia, pharyngeal edema, was evaluated by ENT on 2/16.  No findings to explain dysphagia.  G-tube was placed by IR on 2/17 and was on nocturnal tube feeds.  Discharged to TCU for rehabilitation.        Acute hypoxic respiratory failure multifactorial -likely from heart failure exacerbation, ESRD on hemodialysis, pulmonary hypertension, JONE not compliant with CPAP, physical deconditioning, recent pneumonia, anxiety related, anemia.  On 3/9, the patient reported shortness of breath at the TCU staff, felt nauseated and ultimately was found to be hypoxic in the low  80s prompting transfer to ER.  Weights have been extremely variable based on documentation, EDW is predicted to be approximately 92.5 kg.  O2 sats are variable depending on anxiety/underlying issues.  *CXR 3/9/2025 showing bilateral interstitial and alveolar opacities, small pleural effusions, mild cardiomegaly, findings suggestive of pulmonary edema and volume overload.  --Patient was admitted to [facility], underwent dialysis, intermittent BiPAP support, decreased tube feeding, free water flushes, was on fluid restriction with which patient having improvement in shortness of breath.  IMC status discontinued 3/11.    --Weaned off from Bipap and has been using CPAP for naps/nighttime  -- Continue CPAP with naps/nighttime.  Encourage use.  -- Supplemental nasal oxygen during day as needed.   -- Pulmonology followed, recommend CPAP on discharge (settings in pulm note)  -- Nephrology followed, continue dialysis MWF, prior to admission Lasix discontinued ineffective.  Appreciate input.  -- Cardiology reviewed patient's chart, continue cardiac meds.  Appreciate input   -- Decreased tube feeding to 65 mL/h for 10 hours.  Decreasedree water flushes to 15 mL before and after medication, free water to 30 mL before and after tube feeds.  Can further decrease tube feeds if oral intake improves.  Monitor electrolytes closely.  --Avoid benzodiazepines.  Of note patient was drowsy on 3/13 after receiving one-time dose of Ativan.   --Address medical issues as discussed  -- 1500 mL fluid restriction.  Strict intake output monitoring.  Daily weights.  -- Aggressive incentive spirometry.  Encourage ambulation as able to tolerate.  -- Feeding with aspiration precautions.  --Close follow-up imaging of chest in few weeks for resolution.  -- follow up with sleep provider to assess need for BiPAP as outpatient in 1-2 months      Goals of Care  Have discussed with patient, daughter goals of care during this hospitalization.  Patient would like to  continue restorative care and live as long as able to. Revisited goals of care discussion with patient's daughter -  continue no CPR, prearrest intubation okay.  Elected for restorative care.  Patient and family declined palliative evaluation.  Patient at high risk for decompensation/readmission.    End-stage renal disease on HD MWF  History of left arm fistula  Of note - has not tolerated more aggressive fluid removal in the past with associated hypotension and A-fib with RVR. Cinacalcet stopped on previous admission.  Nephrology following, recommend to continue dialysis MWF.  Appreciate input.  May need to consider 4 times a week-? In future.   Next dialysis 3/17    Acute exacerbation of heart failure with preserved EF.  Elevated troponin from volume overload, renal disease.  Type II MI.  Paroxysmal atrial fibrillation on chronic anticoagulation (Eliquis)   Essential hypertension.  Hyperlipidemia.  History of bradycardia  *TTE 1/21/2025 with LVEF of 60%.  No valvular abnormality   *RHC 3/5/25 showing moderately elevated right and left-sided filling pressure; elevated wedge pressure consistent with pulmonary hypertension  *Cardiology and EP followed on prior hospital stay (01/2025), required IV amiodarone at that time and transition to oral therapy.    At this time troponin 76 > 83.  proBNP 33, 144 (baseline 20s-40k).  --Cardiology reviewed chart on 3/11, recommended volume management through dialysis.  Appreciate input.  Dialysis as above.  Discontinued PTA Lasix 3/14 after okay by nephrology  Continue PTA amiodarone 200 mg twice daily.  Continue PTA apixaban 5 mg twice daily.  closely monitor hemoglobin levels.  Continue PTA hydralazine 75 mg every 8 hours.  Continue PTA Lipitor.  Telemetry monitoring.  Close cardiology follow-up as outpatient within 1 week on discharge.    Hyponatremia, hypochloremia.  Sodium 131, chloride 92 on 3/14.  Fluid removal with dialysis.  Monitor BMP in AM.    Acute on chronic  anxiety.  Depression.  Insomnia.  Continue PTA Zoloft.    Added as needed Seroquel twice daily.  QTc within normal limits.  As needed melatonin as needed for sleep.  Would avoid benzodiazepines if able too.   Can consider behavioral therapy as outpatient.    Status post blood transfusion  Acute on chronic anemia likely dilutional, no active blood loss.  Anemia of chronic disease, end-stage renal disease  Admission hemoglobin 7.0, baseline 7.0-8.0  -Hemoglobin dropped to 6.8 on 3/10, received 1 unit PRBC.  Now hemoglobin in 8 range.  -EPO per nephrology.  Monitor hemoglobin levels closely. Will need to continue Eliquis despite anemia given ([id number] - VASc 7).    S/p PEG tube 2/17/2025  Gastroesophageal reflux disease  Vague dysphagia intermittent oral pharynx upper esophagus  *Esophagram 3/4-normal   - Continue pantoprazole   Continue oral diet as recommended by speech therapy.    Continue tube feeds nocturnal-decreased to 10 hours at nighttime.  Recommend calorie count at TCU to wean off tube feeds if able to.     Type II DM, controlled with hemoglobin A1c of 6.5 [likely low in the setting of anemia]  Diabetic neuropathy  PTA regimen includes: PTA lantus 15 units bid (but discharged on 3/6 on 10 units BID)  Noted hyperglycemia.  Increase insulin Lantus to 15 units twice daily.  Medium intensity sliding scale insulin, monitor blood sugars and optimize regimen.  Hypoglycemia protocol in place.     Physical deconditioning from medical illness, senile frailty.  S/p left above-knee amputation, traumatic  Patient from TCU, will discharge back to TCU.  Encourage ambulation out of bed, per nursing report using the lift.  Fall precautions     Sacrococcygeal Pressure injury, Stage 3, present on admission  Wound care team followed.  Pressure injury prevention.     Obesity with a BMI of 34.  Increase all-cause mortality and morbidity.  Consider lifestyle modification with diet and exercise as able to    Clinically Significant  "Risk Factors     # Hypertension: Noted on problem list    # Chronic heart failure with preserved ejection fraction: heart failure noted on problem list and last echo with EF >50%    # Acute Hypercapnic Respiratory Failure: based on venous blood gas results.  Continue supplemental oxygen and ventilatory support as indicated.    # DMII: A1C = 6.5 % (Ref range: <5.7 %) within past 6 months   # Obesity: Estimated body mass index is 32.65 kg/m  as calculated from the following:    Height as of this encounter: 1.651 m (5' 5\").    Weight as of this encounter: 89 kg (196 lb 3.4 oz).        # Financial/Environmental Concerns: none       Orders Placed This Encounter      Combination Diet Regular Diet; Thin Liquids (level 0) (Upright position during and 60 minutes after oral intake. Slow pace with rest breaks as needed. Choose softer foods. Upper denture in place.)      DVT Prophylaxis: SCDs, on DOAC.  Code Status: No CPR- Pre-arrest intubation OK    Medically Ready for Discharge: Ready Now, discharge pending to TCU, delayed by level 2 assessment by social work (triggered by mistake, and TCU will not accept over the weekend). Needs CPAP at TCU, nursing supervisor there states DME company cannot deliver until weekdays.    Likely discharge 3/17 after dialysis       Jimi Oliveira MD        Interval History:      No acute concerns, denies shortness of breath, chest pain, nausea or other concerns.           Physical Exam:        Physical Exam   Temp:  [98  F (36.7  C)-98.5  F (36.9  C)] 98.4  F (36.9  C)  Pulse:  [62-74] 62  Resp:  [16-20] 16  BP: (123-157)/(34-74) 123/61  SpO2:  [95 %-99 %] 95 %    Intake/Output Summary (Last 24 hours) at 3/10/2025 1805  Last data filed at 3/10/2025 1530  Gross per 24 hour   Intake 410 ml   Output 3500 ml   Net -3090 ml       Admission Weight: 99.8 kg (220 lb)  Current Weight: 102 kg (224 lb 14.4 oz)    PHYSICAL EXAM  GENERAL: Patient is in no distress.    LUNGS: Bilateral decreased breath sounds, " no wheezing, on 2L NC  Respirations unlabored  NEURO: Moving all extremities.  EXTREMITIES: Pedal edema present.  SKIN: Warm, dry.  PSYCHIATRY cooperative.       Medications:        Current Facility-Administered Medications   Medication Dose Route Frequency Provider Last Rate Last Admin    - MEDICATION INSTRUCTIONS for Dialysis Patients -   Does not apply See Admin Instructions Cyril Ward MD        amiodarone (PACERONE) tablet 200 mg  200 mg Oral or Feeding Tube BID Celine Damon APRN CNP   200 mg at 03/15/25 2058    amLODIPine (NORVASC) tablet 10 mg  10 mg Oral or Feeding Tube Once per day on Sunday Tuesday Thursday Saturday Celine Damon APRN CNP   10 mg at 03/15/25 0901    apixaban ANTICOAGULANT (ELIQUIS) tablet 5 mg  5 mg Oral or Feeding Tube BID Celine Damon APRN CNP   5 mg at 03/15/25 2056    atorvastatin (LIPITOR) tablet 20 mg  20 mg Oral or Feeding Tube QPM Celine Damon APRN CNP   20 mg at 03/15/25 2055    B and C vitamin Complex with folic acid (NEPHRONEX) liquid 5 mL  5 mL Per Feeding Tube Daily Cyril Ward MD   5 mL at 03/15/25 0859    calcitRIOL (ROCALTROL) capsule 0.5 mcg  0.5 mcg Oral or Feeding Tube Once per day on Monday Wednesday Friday Celine Damon APRN CNP   0.5 mcg at 03/14/25 0835    cetirizine (zyrTEC) tablet 10 mg  10 mg Oral or Feeding Tube At Bedtime Celine Damon APRN CNP   10 mg at 03/15/25 2103    heparin (porcine) injection  500 Units Hemodialysis Machine OR IV Push Once in dialysis/CRRT Tian Lim MD        hydrALAZINE (APRESOLINE) tablet 75 mg  75 mg Oral Q8H Celine Damon APRN CNP   75 mg at 03/16/25 0510    insulin aspart (NovoLOG) injection (RAPID ACTING)  1-7 Units Subcutaneous TID AC Celine Damon APRN CNP   1 Units at 03/14/25 1808    insulin aspart (NovoLOG) injection (RAPID ACTING)  1-5 Units Subcutaneous At Bedtime Celine Damon APRN CNP   1 Units at 03/11/25 0985    insulin glargine (LANTUS PEN)  injection 15 Units  15 Units Subcutaneous BID Cyril Ward MD   15 Units at 03/15/25 2227    midodrine (PROAMATINE) tablet 5 mg  5 mg Oral or Feeding Tube Once per day on Monday Wednesday Friday Celine Damon APRN CNP   5 mg at 03/14/25 0834    pantoprazole (PROTONIX) 2 mg/mL suspension 40 mg  40 mg Per Feeding Tube QAM AC Celine Damon APRN CNP   40 mg at 03/16/25 0641    sertraline (ZOLOFT) tablet 25 mg  25 mg Oral or Feeding Tube Daily Cyril Ward MD   25 mg at 03/15/25 0900    sertraline (ZOLOFT) tablet 50 mg  50 mg Oral or Feeding Tube Daily Cyril Ward MD   50 mg at 03/15/25 0901    sodium chloride (PF) 0.9% PF flush 3 mL  3 mL Intracatheter Q8H Celine Damon APRN CNP   3 mL at 03/16/25 0104    wound support modular (EXPEDITE) bottle 60 mL  60 mL Oral Daily Cyril Ward MD   60 mL at 03/15/25 0900     Current Facility-Administered Medications   Medication Dose Route Frequency Provider Last Rate Last Admin    acetaminophen (TYLENOL) tablet 650 mg  650 mg Oral Q4H PRN Celine Damon APRN CNP   650 mg at 03/11/25 0937    albuterol (PROVENTIL HFA/VENTOLIN HFA) inhaler  2 puff Inhalation Q6H PRN Celine Damon APRN CNP        calcium carbonate (TUMS) chewable tablet 1,000 mg  1,000 mg Oral 4x Daily PRN Celine Damon APRN CNP   1,000 mg at 03/11/25 0937    carboxymethylcellulose PF (REFRESH PLUS) 0.5 % ophthalmic solution 1 drop  1 drop Both Eyes Q1H PRN Kurt Escobar NP        dextrose 10% infusion   Intravenous Continuous PRN Cyril Ward MD        glucose gel 15-30 g  15-30 g Oral Q15 Min PRN Cyril Ward MD        Or    dextrose 50 % injection 25-50 mL  25-50 mL Intravenous Q15 Min PRN Cyril Ward MD        Or    glucagon injection 1 mg  1 mg Subcutaneous Q15 Min PRN Cyril Ward MD        ipratropium - albuterol 0.5 mg/2.5 mg/3 mL (DUONEB) neb solution 3 mL  3 mL Nebulization Q4H PRN Celine Damon APRN CNP         lidocaine (LMX4) cream   Topical Q1H PRN Kurt Escobar NP        lidocaine 1 % 0.1-1 mL  0.1-1 mL Other Q1H PRN Kurt Escobar NP        melatonin tablet 3 mg  3 mg Oral At Bedtime PRN Cyril Ward MD   3 mg at 03/13/25 2215    No lozenges or gum should be given while patient on BIPAP/AVAPS/AVAPS AE   Does not apply Continuous PRN Kurt Escobar NP        olopatadine (PATANOL) 0.1 % ophthalmic solution 1 drop  1 drop Both Eyes BID PRN Celine Damon APRN CNP        ondansetron (ZOFRAN ODT) ODT tab 4 mg  4 mg Oral Q6H PRN Celine Damon APRN CNP        Or    ondansetron (ZOFRAN) injection 4 mg  4 mg Intravenous Q6H PRN Celine Damon APRN CNP   4 mg at 03/14/25 1019    Patient is already receiving anticoagulation with heparin, enoxaparin (LOVENOX), warfarin (COUMADIN)  or other anticoagulant medication   Does not apply Continuous PRN Celine Damon APRN CNP        Patient may continue current oral medications   Does not apply Continuous PRN Kurt Escobar NP        QUEtiapine (SEROquel) half-tab 12.5 mg  12.5 mg Oral or Feeding Tube BID PRN Cyril Ward MD        senna-docusate (SENOKOT-S/PERICOLACE) 8.6-50 MG per tablet 1 tablet  1 tablet Oral BID PRN Celine Damon APRN CNP        Or    senna-docusate (SENOKOT-S/PERICOLACE) 8.6-50 MG per tablet 2 tablet  2 tablet Oral BID PRN Celine Damon APRN CNP        sevelamer carbonate (RENVELA) tablet 800 mg  800 mg Oral or Feeding Tube BID PRN Cyril Ward MD        sodium chloride (PF) 0.9% PF flush 3 mL  3 mL Intracatheter q1 min prn Celine Damon APRN CNP   3 mL at 03/15/25 0013            Data:      All new lab and imaging data was reviewed.

## 2025-03-17 VITALS
BODY MASS INDEX: 34.05 KG/M2 | HEART RATE: 64 BPM | HEIGHT: 65 IN | OXYGEN SATURATION: 98 % | RESPIRATION RATE: 18 BRPM | SYSTOLIC BLOOD PRESSURE: 120 MMHG | TEMPERATURE: 98.4 F | WEIGHT: 204.37 LBS | DIASTOLIC BLOOD PRESSURE: 53 MMHG

## 2025-03-17 LAB
ANION GAP SERPL CALCULATED.3IONS-SCNC: 10 MMOL/L (ref 7–15)
BUN SERPL-MCNC: 66.3 MG/DL (ref 8–23)
CALCIUM SERPL-MCNC: 10.5 MG/DL (ref 8.8–10.4)
CHLORIDE SERPL-SCNC: 96 MMOL/L (ref 98–107)
CREAT SERPL-MCNC: 5.55 MG/DL (ref 0.51–0.95)
EGFRCR SERPLBLD CKD-EPI 2021: 7 ML/MIN/1.73M2
GLUCOSE BLDC GLUCOMTR-MCNC: 109 MG/DL (ref 70–99)
GLUCOSE BLDC GLUCOMTR-MCNC: 175 MG/DL (ref 70–99)
GLUCOSE BLDC GLUCOMTR-MCNC: 328 MG/DL (ref 70–99)
GLUCOSE SERPL-MCNC: 265 MG/DL (ref 70–99)
HCO3 SERPL-SCNC: 31 MMOL/L (ref 22–29)
MAGNESIUM SERPL-MCNC: 2.5 MG/DL (ref 1.7–2.3)
PHOSPHATE SERPL-MCNC: 4.8 MG/DL (ref 2.5–4.5)
POTASSIUM SERPL-SCNC: 5.1 MMOL/L (ref 3.4–5.3)
SODIUM SERPL-SCNC: 137 MMOL/L (ref 135–145)

## 2025-03-17 PROCEDURE — 80048 BASIC METABOLIC PNL TOTAL CA: CPT | Performed by: HOSPITALIST

## 2025-03-17 PROCEDURE — 84100 ASSAY OF PHOSPHORUS: CPT | Performed by: HOSPITALIST

## 2025-03-17 PROCEDURE — 250N000009 HC RX 250: Performed by: INTERNAL MEDICINE

## 2025-03-17 PROCEDURE — 83735 ASSAY OF MAGNESIUM: CPT | Performed by: HOSPITALIST

## 2025-03-17 PROCEDURE — 250N000013 HC RX MED GY IP 250 OP 250 PS 637: Performed by: NURSE PRACTITIONER

## 2025-03-17 PROCEDURE — 634N000001 HC RX 634: Mod: JZ | Performed by: INTERNAL MEDICINE

## 2025-03-17 PROCEDURE — 90935 HEMODIALYSIS ONE EVALUATION: CPT | Performed by: INTERNAL MEDICINE

## 2025-03-17 PROCEDURE — 94660 CPAP INITIATION&MGMT: CPT

## 2025-03-17 PROCEDURE — 999N000157 HC STATISTIC RCP TIME EA 10 MIN

## 2025-03-17 PROCEDURE — 250N000011 HC RX IP 250 OP 636: Performed by: INTERNAL MEDICINE

## 2025-03-17 PROCEDURE — 250N000013 HC RX MED GY IP 250 OP 250 PS 637: Performed by: HOSPITALIST

## 2025-03-17 PROCEDURE — 99239 HOSP IP/OBS DSCHRG MGMT >30: CPT | Performed by: INTERNAL MEDICINE

## 2025-03-17 PROCEDURE — 90937 HEMODIALYSIS REPEATED EVAL: CPT

## 2025-03-17 PROCEDURE — 258N000003 HC RX IP 258 OP 636: Performed by: INTERNAL MEDICINE

## 2025-03-17 RX ORDER — CALCITRIOL 0.25 UG/1
0.25 CAPSULE, LIQUID FILLED ORAL
Status: DISCONTINUED | OUTPATIENT
Start: 2025-03-19 | End: 2025-03-17 | Stop reason: HOSPADM

## 2025-03-17 RX ORDER — ALBUMIN (HUMAN) 12.5 G/50ML
50 SOLUTION INTRAVENOUS
Status: DISCONTINUED | OUTPATIENT
Start: 2025-03-17 | End: 2025-03-17

## 2025-03-17 RX ORDER — CALCITRIOL 0.25 UG/1
0.25 CAPSULE, LIQUID FILLED ORAL
Status: ON HOLD | DISCHARGE
Start: 2025-03-17

## 2025-03-17 RX ORDER — HEPARIN SODIUM 1000 [USP'U]/ML
500 INJECTION, SOLUTION INTRAVENOUS; SUBCUTANEOUS CONTINUOUS
Status: DISCONTINUED | OUTPATIENT
Start: 2025-03-17 | End: 2025-03-17

## 2025-03-17 RX ADMIN — EPOETIN ALFA-EPBX 4000 UNITS: 4000 INJECTION, SOLUTION INTRAVENOUS; SUBCUTANEOUS at 10:09

## 2025-03-17 RX ADMIN — SODIUM CHLORIDE 200 ML: 0.9 INJECTION, SOLUTION INTRAVENOUS at 08:12

## 2025-03-17 RX ADMIN — SODIUM CHLORIDE 250 ML: 0.9 INJECTION, SOLUTION INTRAVENOUS at 09:11

## 2025-03-17 RX ADMIN — MIDODRINE HYDROCHLORIDE 5 MG: 5 TABLET ORAL at 08:52

## 2025-03-17 RX ADMIN — IRON SUCROSE 100 MG: 20 INJECTION, SOLUTION INTRAVENOUS at 10:11

## 2025-03-17 RX ADMIN — APIXABAN 5 MG: 5 TABLET, FILM COATED ORAL at 14:45

## 2025-03-17 RX ADMIN — LIDOCAINE HYDROCHLORIDE 0.5 ML: 10 INJECTION, SOLUTION EPIDURAL; INFILTRATION; INTRACAUDAL; PERINEURAL at 09:19

## 2025-03-17 RX ADMIN — HYDRALAZINE HYDROCHLORIDE 75 MG: 50 TABLET ORAL at 05:56

## 2025-03-17 RX ADMIN — LIDOCAINE HYDROCHLORIDE 0.5 ML: 10 INJECTION, SOLUTION EPIDURAL; INFILTRATION; INTRACAUDAL; PERINEURAL at 09:51

## 2025-03-17 RX ADMIN — HEPARIN SODIUM 500 UNITS: 1000 INJECTION, SOLUTION INTRAVENOUS; SUBCUTANEOUS at 07:12

## 2025-03-17 RX ADMIN — SERTRALINE HYDROCHLORIDE 50 MG: 50 TABLET ORAL at 08:52

## 2025-03-17 RX ADMIN — SERTRALINE HYDROCHLORIDE 25 MG: 25 TABLET ORAL at 08:52

## 2025-03-17 RX ADMIN — HEPARIN SODIUM 500 UNITS/HR: 1000 INJECTION, SOLUTION INTRAVENOUS; SUBCUTANEOUS at 07:13

## 2025-03-17 RX ADMIN — Medication 40 MG: at 06:37

## 2025-03-17 ASSESSMENT — ACTIVITIES OF DAILY LIVING (ADL)
ADLS_ACUITY_SCORE: 83
ADLS_ACUITY_SCORE: 87
ADLS_ACUITY_SCORE: 83

## 2025-03-17 NOTE — PROGRESS NOTES
Care Management Discharge Note    Discharge Date: 03/17/2025       Discharge Disposition: Transitional Care    Discharge Services:      Discharge DME:      Discharge Transportation:      Private pay costs discussed: transportation costs    Does the patient's insurance plan have a 3 day qualifying hospital stay waiver?  No    PAS Confirmation Code: [id number]  Patient/family educated on Medicare website which has current facility and service quality ratings: no    Education Provided on the Discharge Plan: Yes  Persons Notified of Discharge Plans: Patient/Family  Patient/Family in Agreement with the Plan: yes    Handoff Referral Completed: No, handoff not indicated or clinically appropriate    Additional Information:  HIGINIO scheduled MH stretcher transport for [id number] today to [facility]. Stretcher transport due to oxygen and poor trunk support. HIGINIO received discharge orders and faxed to [facility] and confirmed discharge time with them. HIGINIO completed PCS. HIGINIO spoke with daughter Caroline Gray and udpated on discharge plans.     TOMER Beverly    [facility]

## 2025-03-17 NOTE — PROGRESS NOTES
Care Management Follow Up    Length of Stay (days): 8    Expected Discharge Date: 03/17/2025     Concerns to be Addressed: discharge planning     Patient plan of care discussed at interdisciplinary rounds: Yes    Anticipated Discharge Disposition: Transitional Care              Anticipated Discharge Services:    Anticipated Discharge DME:      Patient/family educated on Medicare website which has current facility and service quality ratings: no  Education Provided on the Discharge Plan: Yes  Patient/Family in Agreement with the Plan: yes    Referrals Placed by CM/SW: Post Acute Facilities  Private pay costs discussed: Not applicable    Discussed  Partnership in Safe Discharge Planning  document with patient/family: No     Handoff Completed: No, handoff not indicated or clinically appropriate    Additional Information:  SW called [facility] and left a vm requesting a call back to coordinate patients return.     Next Steps: Coordinate return to [facility]    TOMER Beverly    [facility]

## 2025-03-17 NOTE — PROGRESS NOTES
Care Management Follow Up    Length of Stay (days): 8    Expected Discharge Date: 03/17/2025     Concerns to be Addressed: discharge planning     Patient plan of care discussed at interdisciplinary rounds: yes    Anticipated Discharge Disposition: Transitional Care  Anticipated Discharge Services:    Anticipated Discharge DME:      Patient/family educated on Medicare website which has current facility and service quality ratings: no  Education Provided on the Discharge Plan: Yes  Patient/Family in Agreement with the Plan: yes    Referrals Placed by CM/SW: Post Acute Facilities  Private pay costs discussed: Not applicable    Discussed  Partnership in Safe Discharge Planning  document with patient/family: No     Handoff Completed: No, handoff not indicated or clinically appropriate    Additional Information:  HIGINIO Called [facility] at: [phone], Spoke with [facility] to see if we can get this Obra II cleared as the pas was completed again by mistake- they searched through records, indicated it does appear this should not be triggered as an Obra II and looks like on the screening from 1/8/2025 The DD options was selected from the [facility] and should have been the MI option as pt does not receive DD services nor have a qualifying DD or related conditions diagnosis.   [facility] may not be able to clear- Checking and they will call HIGINIO back.    HIGINIO called Dar at [facility] [phone] and left a voicemail again.  HIGINIO emailed [email or web address] requesting to have this obra II cleared.     Addendum 8532: Higinio received call back from [facility] and confirmed they are unable to clear this and [facility] will need to clear the Obra II and fix the issue triggering the Obra II    Addendum 7045: HIGINIO received call from Savage with [facility] Obra II  [phone]- HIGINIO confirmed that pt does not have a DD diagnosis on file nor related condition.  HIGINIO relayed the pt did not need  this additional screening as they were admitted from a TCU and returning to TCU and were not back in the community in between.  indicated they will be clearing this assessment.   SW requested they look into the previous Obra that is triggering this as it appears there is an error from screening done on 1/8/2025,  indicated he will see if his supervisors can look into this and correct.      Next Steps: Coordinate discharge with TCU, Transport, update pt/family.     Rafia Ugalde, BSW

## 2025-03-17 NOTE — PROGRESS NOTES
Potassium   Date Value Ref Range Status   03/17/2025 5.1 3.4 - 5.3 mmol/L Final   02/02/2022 4.7 3.4 - 5.3 mmol/L Final   04/17/2021 4.2 3.4 - 5.3 mmol/L Final     Hemoglobin   Date Value Ref Range Status   03/15/2025 8.6 (L) 11.7 - 15.7 g/dL Final   04/16/2021 10.9 (L) 11.7 - 15.7 g/dL Final     Creatinine   Date Value Ref Range Status   03/17/2025 5.55 (H) 0.51 - 0.95 mg/dL Final   04/17/2021 5.98 (H) 0.52 - 1.04 mg/dL Final     Urea Nitrogen   Date Value Ref Range Status   03/17/2025 66.3 (H) 8.0 - 23.0 mg/dL Final   11/24/2021 37 (H) 7 - 30 mg/dL Final   04/17/2021 68 (H) 7 - 30 mg/dL Final     Sodium   Date Value Ref Range Status   03/17/2025 137 135 - 145 mmol/L Final   04/17/2021 137 133 - 144 mmol/L Final     INR   Date Value Ref Range Status   02/14/2025 1.57 (H) 0.85 - 1.15 Final   04/16/2021 1.14 0.86 - 1.14 Final       DIALYSIS PROCEDURE NOTE  Hepatitis status of previous patient on machine log was checked and verified ok to use with this patients hepatitis status.  Patient dialyzed for 3.5 hrs. on a K2 bath with a net fluid removal of 2.5L.  A BFR of 350 ml/min was obtained via a Left AVF using 16 gauge needles.      The treatment plan was discussed with Dr. Oneill during the treatment.    Total heparin received during the treatment: 1750 units.   Needle cannulation sites held x 10 min.     Meds given: 5mg midodrine given pre treatment and 4,000 units epoetin and 100mg venofer given during dialysis     Complications: Soft pressures during last hour of treatment, reduced UF goal to 2.5L and systolic pressures maintained > 90      Person educated: Patient. Knowledge base adequate. Barriers to learning: none. Educated on procedure, fluid management and medication via verbal mode. Pt prefers verbal education style.     ICEBOAT? Timeout performed pre-treatment  I: Patient was identified using 2 identifiers  C:  Consent Signed Yes  E: Equipment preventative maintenance is current and dialysis delivery system  OK to use  B: Hepatitis B Surface Antigen: Negative; Draw Date: 03/03/2025      Hepatitis B Surface Antibody: Susceptible; Draw Date: 03/03/2025  O: Dialysis orders present and complete prior to treatment  A: Vascular access verified and assessed prior to treatment  T: Treatment was performed at a clinically appropriate time  ?: Patient was allowed to ask questions and address concerns prior to treatment  See Adult Hemodialysis flowsheet in EPIC for further details and post assessment.  Machine water alarm in place and functioning. Transducer pods intact and checked every 15min.   Pt returned via bed transport.  Chlorine/Chloramine water system checked every 4 hours.  Outpatient Dialysis at [facility] [place] on MWF     Patient repositioned every 2 hours during the treatment.  Post treatment report given to JACQUI Guadarrama RN regarding 2.5L of fluid removed, last BP of 111/72, and patient pain rating of 0/10.     Please remove patient dressing on AVF and AVG needle sites 24 hours after dialysis. If leaking occurs please apply a Band-Aid.

## 2025-03-17 NOTE — PROGRESS NOTES
Nursing Update  Pt arrived back from Dialysis at approx 1400. Dialysis nurse reported goal for this pt is 2.5 to 3 liters. Today only able to pull 2.5 liters due to hypotension. Left AV  fistula site with with good bruit/thrill dressings are dry/intact

## 2025-03-17 NOTE — DISCHARGE SUMMARY
"[facility]  Hospitalist Discharge Summary      Date of Admission:  3/9/2025  Date of Discharge:  3/17/2025  5:14 PM  Discharging Provider: Sameera Gandara MD  Discharge Service: Hospitalist Service    Discharge Diagnoses     Acute hypoxic respiratory failure multifactorial -likely from heart failure exacerbation, ESRD on hemodialysis     Clinically Significant Risk Factors     # DMII: A1C = 6.5 % (Ref range: <5.7 %) within past 6 months  # Obesity: Estimated body mass index is 34.01 kg/m  as calculated from the following:    Height as of this encounter: 1.651 m (5' 5\").    Weight as of this encounter: 92.7 kg (204 lb 5.9 oz).       Follow-ups Needed After Discharge   Follow-up Appointments       Follow Up and recommended labs and tests      Follow up with Nursing home physician.  No follow up labs or test are needed.    Follow up with dialysis Mondays, Wednesday and Fridays    Follow up with pulmonology/sleep medicine outpatient in 1-2 months     Follow up with primary care for hospital follow up, consider repeat chest imaging                Unresulted Labs Ordered in the Past 30 Days of this Admission       Date and Time Order Name Status Description    1/20/2025  8:19 PM Acid-Fast Bacilli Culture and Stain In process         These results will be followed up by PCP    Discharge Disposition   TCU  Condition at discharge: Stable    Hospital Course    Supriya Herr is a 75 year old female with history of ESRD on hemodialysis, heart failure with preserved EF, COPD, JONE, chronic anemia, hypertension, hyperlipidemia, poor mobility due to left AKA, obesity, anxiety, depression, former nicotine use admitted on 3/9/2025 for shortness of breath.     *Hospitalized at [facility] from 3/2 - 3/6/2025 with shortness of breath likely multifactorial.  Volume status was difficult to determine, underwent right heart cath on 3/5 showed mildly elevated right atrial and PCWP readings.  Was treated for acute " respiratory failure likely from heart failure exacerbation, atrial fibrillation, end-stage renal disease and discharged to care facility with supplemental nocturnal oxygen.  *Prolonged hospitalized at [facility] from 1/8 to 2/26/2025 for acute hypoxic respiratory failure multifactorial, was intubated ( 1/9-1/18), reintubated (1/20 - 1/25).  Then was treated for shock with pressors, influenza A pneumonia, hospital-acquired pneumonia with intravenous antibiotics, steroids, antivirals.  Then also noted to have heart failure exacerbation, recurrent A-fib with RVR, subsequent bradycardia.  Then also noted to have encephalopathy likely from infectious, metabolic etiology and delirium.  During that hospitalization was noted to have dysphagia, pharyngeal edema, was evaluated by ENT on 2/16.  No findings to explain dysphagia.  G-tube was placed by IR on 2/17 and was on nocturnal tube feeds.  Discharged to TCU for rehabilitation.         Acute hypoxic respiratory failure multifactorial -likely from heart failure exacerbation, ESRD on hemodialysis, pulmonary hypertension, JONE not compliant with CPAP, physical deconditioning, recent pneumonia, anxiety related, anemia.  On 3/9, the patient reported shortness of breath at the TCU staff, felt nauseated and ultimately was found to be hypoxic in the low 80s prompting transfer to ER.  Weights have been extremely variable based on documentation, EDW is predicted to be approximately 92.5 kg.  O2 sats are variable depending on anxiety/underlying issues.  *CXR 3/9/2025 showing bilateral interstitial and alveolar opacities, small pleural effusions, mild cardiomegaly, findings suggestive of pulmonary edema and volume overload.  --Patient was admitted to [facility], underwent dialysis, intermittent BiPAP support, decreased tube feeding, free water flushes, was on fluid restriction with which patient having improvement in shortness of breath.  IMC status discontinued 3/11.    --Weaned off from Bipap and  has been using CPAP for naps/nighttime  -- Continue CPAP with naps/nighttime.  Encourage use.  -- Supplemental nasal oxygen during day as needed.   -- Pulmonology followed, recommend CPAP on discharge (settings in pulm note)  -- Nephrology followed, continue dialysis MWF, prior to admission Lasix discontinued ineffective.  Appreciate input.  -- Cardiology reviewed patient's chart, continue cardiac meds.  Appreciate input   -- Decreased tube feeding to 65 mL/h for 10 hours.  Decreasedree water flushes to 15 mL before and after medication, free water to 30 mL before and after tube feeds.  Can further decrease tube feeds if oral intake improves.  Monitor electrolytes closely.  --Avoid benzodiazepines.  Of note patient was drowsy on 3/13 after receiving one-time dose of Ativan.   --Address medical issues as discussed  -- 1500 mL fluid restriction.  Strict intake output monitoring.  Daily weights.  -- Aggressive incentive spirometry.  Encourage ambulation as able to tolerate.  -- Feeding with aspiration precautions.  --Close follow-up imaging of chest in few weeks for resolution.  -- follow up with sleep provider to assess need for BiPAP as outpatient in 1-2 months      Goals of Care  Have discussed with patient, daughter goals of care during this hospitalization.  Patient would like to continue restorative care and live as long as able to. Revisited goals of care discussion with patient's daughter -  continue no CPR, prearrest intubation okay.  Elected for restorative care.  Patient and family declined palliative evaluation.  Patient at high risk for decompensation/readmission.     End-stage renal disease on HD MWF  History of left arm fistula  Of note - has not tolerated more aggressive fluid removal in the past with associated hypotension and A-fib with RVR. Cinacalcet stopped on previous admission.  Nephrology following, recommend to continue dialysis MWF.  Appreciate input.  May need to consider 4 times a week-? In  future.   Next dialysis 3/17     Acute exacerbation of heart failure with preserved EF.  Elevated troponin from volume overload, renal disease.  Type II MI.  Paroxysmal atrial fibrillation on chronic anticoagulation (Eliquis)   Essential hypertension.  Hyperlipidemia.  History of bradycardia  *TTE 1/21/2025 with LVEF of 60%.  No valvular abnormality   *RHC 3/5/25 showing moderately elevated right and left-sided filling pressure; elevated wedge pressure consistent with pulmonary hypertension  *Cardiology and EP followed on prior hospital stay (01/2025), required IV amiodarone at that time and transition to oral therapy.    At this time troponin 76 > 83.  proBNP 33, 144 (baseline 20s-40k).  --Cardiology reviewed chart on 3/11, recommended volume management through dialysis.  Appreciate input.  Dialysis as above.  Discontinued PTA Lasix 3/14 after okay by nephrology  Continue PTA amiodarone 200 mg twice daily.  Continue PTA apixaban 5 mg twice daily.  closely monitor hemoglobin levels.  Continue PTA hydralazine 75 mg every 8 hours.  Continue PTA Lipitor.  Telemetry monitoring.  Close cardiology follow-up as outpatient within 1 week on discharge.     Hyponatremia, hypochloremia.  Sodium 131, chloride 92 on 3/14.  Fluid removal with dialysis.  Monitor BMP in AM.     Acute on chronic anxiety.  Depression.  Insomnia.  Continue PTA Zoloft.    Added as needed Seroquel twice daily.  QTc within normal limits.  As needed melatonin as needed for sleep.  Would avoid benzodiazepines if able too.   Can consider behavioral therapy as outpatient.     Status post blood transfusion  Acute on chronic anemia likely dilutional, no active blood loss.  Anemia of chronic disease, end-stage renal disease  Admission hemoglobin 7.0, baseline 7.0-8.0  -Hemoglobin dropped to 6.8 on 3/10, received 1 unit PRBC.  Now hemoglobin in 8 range.  -EPO per nephrology.  Monitor hemoglobin levels closely. Will need to continue Eliquis despite anemia given  (QHE0NG2 - VASc 7).     S/p PEG tube 2/17/2025  Gastroesophageal reflux disease  Vague dysphagia intermittent oral pharynx upper esophagus  *Esophagram 3/4-normal   - Continue pantoprazole   Continue oral diet as recommended by speech therapy.    Continue tube feeds nocturnal-decreased to 10 hours at nighttime.  Recommend calorie count at TCU to wean off tube feeds if able to.     Type II DM, controlled with hemoglobin A1c of 6.5 [likely low in the setting of anemia]  Diabetic neuropathy  continue lantus 15 units bid        Physical deconditioning from medical illness, senile frailty.  S/p left above-knee amputation, traumatic  Patient from TCU, will discharge back to TCU.  Fall precautions     Sacrococcygeal Pressure injury, Stage 3, present on admission  Wound care team followed.  Pressure injury prevention.      Obesity with a BMI of 34.  Increase all-cause mortality and morbidity.  Consider lifestyle modification with diet and exercise as able to    Consultations This Hospital Stay   NEPHROLOGY IP CONSULT  NEPHROLOGY IP CONSULT  CARE MANAGEMENT / SOCIAL WORK IP CONSULT  SPIRITUAL HEALTH SERVICES IP CONSULT  CARE MANAGEMENT / SOCIAL WORK IP CONSULT  CARE MANAGEMENT / SOCIAL WORK IP CONSULT  WOUND OSTOMY CONTINENCE NURSE  IP CONSULT  SPEECH LANGUAGE PATH ADULT IP CONSULT  PULMONARY IP CONSULT  CARDIOLOGY IP CONSULT  NUTRITION SERVICES ADULT IP CONSULT  PHARMACY IP CONSULT  VASCULAR ACCESS ADULT IP CONSULT  PHYSICAL THERAPY ADULT IP CONSULT  OCCUPATIONAL THERAPY ADULT IP CONSULT  SPEECH LANGUAGE PATH ADULT IP CONSULT  PULMONARY IP CONSULT    Code Status   No CPR- Pre-arrest intubation OK    Time Spent on this Encounter   I, Sameera Gandara MD, personally saw the patient today and spent greater than 30 minutes discharging this patient.       Sameera Gandara MD  [place] [address] ONCOLOGY  [facility] [address] [address]  Phone:  [phone]  ______________________________________________________________________    Physical Exam   Vital Signs: Temp: 98.3  F (36.8  C) Temp src: Oral BP: (!) 96/36 Pulse: 61   Resp: 29 SpO2: 100 % O2 Device: Nasal cannula Oxygen Delivery: 3 LPM  Weight: 204 lbs 5.86 oz  Constitutional: Awake, alert, cooperative, no apparent distress  Respiratory: Clear to auscultation bilaterally, no crackles or wheezing  Cardiovascular: Regular rate and rhythm, normal S1 and S2, and no murmur noted  GI: Normal bowel sounds, soft, non-distended, non-tender  Skin/Integumen: No rashes, no cyanosis, no edema         Primary Care Physician   Noam Jackson    Discharge Orders      CPAP Order for DME - ONLY FOR DME    If providing a ResMed device for this [facility] patient, please add [facility] Scarosso as an Integrator in [facility] along with patient's MRN:[id number] and CSN:[id number].     Primary Care - Care Coordination Referral      Med Therapy Management Referral      Adult Sleep Eval & Management Referral      Discharge Instructions    CPAP at nighttime, with naps -settings as recommended by pulmonologist.     General info for SNF    Length of Stay Estimate: Short Term Care: Estimated # of Days <30  Condition at Discharge: Improving  Level of care:skilled   Rehabilitation Potential: Good  Admission H&P remains valid and up-to-date: Yes  Recent Chemotherapy: N/A  Use Nursing Home Standing Orders: Yes     Mantoux instructions    Give two-step Mantoux (PPD) Per Facility Policy Yes     Follow Up and recommended labs and tests    Follow up with Nursing home physician.  No follow up labs or test are needed.    Follow up with dialysis Mondays, Wednesday and Fridays    Follow up with pulmonology/sleep medicine outpatient in 1-2 months     Follow up with primary care for hospital follow up, consider repeat chest imaging     Tubes and Drains    Current Tubes and Drains:     Drain  Duration           GI Enteral Access Device  LUQ Gastrostomy 6 days        Line  Duration           Hemodialysis Vascular Access AV fistula Superior Arm -- days    Hemodialysis Vascular Access Arteriovenous graft Left Forearm 1774 days              Reason for your hospital stay    You were hospitalized for respiratory failure     Glucose monitor nursing POCT    Before meals and at bedtime     Wound care (specify)    Wound care  EVERY THIRD DAY     Comments: Coccyx: Every 3 days and as needed  Cleanse the area with NS and pat dry.  Apply No sting film barrier to periwound skin.  Cover wound with Sacral Mepilex (#060260)  Turn and reposition Q 2hrs side to side only.  Ensure pt has Koko-cushion while sitting up in the chair.  FYI- If pt has constant incontinent loose stools needing dressing changes Q shift please discontinue the Mepilex dressing and apply criticaid barrier paste BID and PRN.     Activity - Up with assistive device     Activity - Up with nursing assistance     CPAP - Continue Home CPAP    NEW CPAP    Device type: Auto-CPAP  PAP settings: CPAP min 5.0 cm  H20                           CPAP max 20.0 cm  H20                          95th% pressure 8.2 cm  H20                            RESMED EPR level Setting: THREE                          RESMED Soft response setting:  ON     No CPR- Pre-arrest intubation OK     Physical Therapy Adult Consult    Evaluate and treat as clinically indicated.    Reason:  weakness/deconditioning     Occupational Therapy Adult Consult    Evaluate and treat as clinically indicated.    Reason:  weakness/deconditioning     Speech Language Path Adult Consult    Evaluate and treat as clinically indicated.    Reason:  swallow eval     Adult Formula Tube Feeding    Adult Formula Drip Feeding: Continuous Jevity 1.5; Gastrostomy; Goal Rate: 65 mL/hr x 10 hrs (8 pm-6am); mL/hr;     Free water order:  Free water route: Gastric   Free water - Feeding Tube Flush Frequency: Before and after each feeding   Free water volume: 60 mL    Additional Free water: None   Comments:     Oxygen (SNF/TCU) Discharge     Fall precautions     Diet    Fluid restriction 1500 ML FLUID  Adult Formula Drip Feeding: Jevity 1.5; Gastrostomy; Goal Rate: 65 mL/hr x 10 hrs (8 pm-6am); mL/hr; Combination Diet Regular Diet; Thin Liquids (Upright position during/after oral intake 60 min. Choose softer foods. Upper denture in place.)    Free water route: Gastric   Free water - Feeding Tube Flush Frequency: Before and after each feeding   Free water volume: 60 mL   Additional Free water: None       Significant Results and Procedures   Results for orders placed or performed during the hospital encounter of 03/09/25   XR Chest 2 Views    Narrative    EXAM: XR CHEST 2 VIEWS  LOCATION: [facility]  DATE: 3/9/2025    INDICATION: Dyspnea.  COMPARISON: 3/2/2025.      Impression    IMPRESSION: Bilateral interstitial and alveolar opacities with mid lower lung predominance. Small pleural effusions. Mild cardiomegaly. Cumulative findings suggest pulmonary edema and volume overload. Basilar atelectasis. No pneumothorax.     XR Chest Port 1 View    Narrative    EXAM: XR CHEST PORT 1 VIEW  LOCATION: [facility]  DATE: 3/9/2025    INDICATION: Worsening hypoxic respiratory failure  COMPARISON: 3/9/2025      Impression    IMPRESSION: Cardiomegaly. Pulmonary vascularity is not well assessed. There is increased interstitial opacities in both lungs when compared to earlier today. These are nonspecific but may indicate developing or worsening pulmonary edema. Probable small   to moderate left pleural effusion.     *Note: Due to a large number of results and/or encounters for the requested time period, some results have not been displayed. A complete set of results can be found in Results Review.       Discharge Medications   Current Discharge Medication List        START taking these medications    Details   melatonin 3 MG tablet Take 1 tablet  (3 mg) by mouth nightly as needed for sleep.    Associated Diagnoses: Other insomnia           CONTINUE these medications which have CHANGED    Details   amiodarone (PACERONE) 200 MG tablet Take 1 tablet (200 mg) by mouth or Feeding Tube 2 times daily.    Associated Diagnoses: Persistent atrial fibrillation (H)      amLODIPine (NORVASC) 10 MG tablet Take 1 tablet (10 mg) by mouth or Feeding Tube daily. Take 1 table  by mouth once daily on every Tue, Thu, Sa, Sun    Associated Diagnoses: Hypertension goal BP (blood pressure) < 140/90      atorvastatin (LIPITOR) 20 MG tablet Take 1 tablet (20 mg) by mouth or Feeding Tube every evening.    Associated Diagnoses: Hypercholesterolemia      insulin glargine (LANTUS PEN) 100 UNIT/ML pen Inject 15 Units subcutaneously 2 times daily.    Comments: If Lantus is not covered by insurance, may substitute Basaglar or Semglee or other insulin glargine product per insurance preference at same dose and frequency.    Associated Diagnoses: Type 2 diabetes, HbA1C goal < 8% (H)      midodrine (PROAMATINE) 5 MG tablet Take 1 tablet (5 mg) by mouth three times a week. Mon, Wed, and Fri (Prior to dialysis)    Associated Diagnoses: ESRD (end stage renal disease) (H)           CONTINUE these medications which have NOT CHANGED    Details   acetaminophen (TYLENOL) 325 MG tablet Take 2 tablets (650 mg) by mouth every 4 hours as needed for mild pain  Qty: 50 tablet, Refills: 0    Associated Diagnoses: ESRD on dialysis (H)      albuterol (PROAIR HFA/PROVENTIL HFA/VENTOLIN HFA) 108 (90 Base) MCG/ACT inhaler Inhale 2 puffs into the lungs every 6 hours as needed for shortness of breath / dyspnea or wheezing  Qty: 3 Inhaler, Refills: 1    Comments: Pharmacy may dispense brand covered by insurance (Proair, or proventil or ventolin or generic albuterol inhaler)  Associated Diagnoses: Cough      apixaban ANTICOAGULANT (ELIQUIS) 5 MG tablet Take 1 tablet (5 mg) by mouth or Feeding Tube 2 times daily.     Associated Diagnoses: Paroxysmal atrial fibrillation (H)      calcitRIOL (ROCALTROL) 0.5 MCG capsule Take 1 capsule (0.5 mcg) by mouth three times a week. Given at dialysis    Comments: Given at dialysis  Associated Diagnoses: ESRD (end stage renal disease) on dialysis (H)      cetirizine (ZYRTEC) 10 MG tablet Take 10 mg by mouth at bedtime.      Glucagon (BAQSIMI ONE PACK) 3 MG/DOSE POWD Spray 3 mg in nostril See Admin Instructions USE ONLY FOR SEVERE HYPOGLYCEMIA.  Qty: 1 each, Refills: 3    Associated Diagnoses: Type 2 diabetes mellitus with hyperglycemia, with long-term current use of insulin (H)      hydrALAZINE (APRESOLINE) 25 MG tablet Take 3 tablets (75 mg) by mouth every 8 hours.    Associated Diagnoses: Hypertension goal BP (blood pressure) < 140/90      !! insulin aspart (NOVOLOG PEN) 100 UNIT/ML pen Inject 1-5 Units subcutaneously at bedtime.    Associated Diagnoses: Type 2 diabetes mellitus with diabetic neuropathy, with long-term current use of insulin (H)      !! insulin aspart (NOVOLOG PEN) 100 UNIT/ML pen Inject 1-7 Units subcutaneously 3 times daily (before meals).    Associated Diagnoses: Type 2 diabetes mellitus with diabetic neuropathy, with long-term current use of insulin (H)      ipratropium - albuterol 0.5 mg/2.5 mg/3 mL (DUONEB) 0.5-2.5 (3) MG/3ML neb solution Take 1 vial (3 mLs) by nebulization every 4 hours as needed for wheezing or shortness of breath.    Associated Diagnoses: Influenza A      miconazole (MICATIN) 2 % external powder Apply topically 2 times daily as needed (redness under breasts/groin) Apply twice daily to skin folds as needed  Qty: 90 g, Refills: 11    Associated Diagnoses: Intertrigo      olopatadine (PATANOL) 0.1 % ophthalmic solution Place 1 drop into both eyes 2 times daily as needed for allergies.      pantoprazole (PROTONIX) 2 mg/mL SUSP suspension Place 20 mLs (40 mg) into Feeding Tube every morning (before breakfast).    Associated Diagnoses: Gastroesophageal  reflux disease with esophagitis without hemorrhage      !! sertraline (ZOLOFT) 25 MG tablet TAKE 1  TABLET BY MOUTH EVERY DAY along with a 50 mg tablet for a total of 75 mg  Qty: 90 tablet, Refills: 3    Associated Diagnoses: NICOLE (generalized anxiety disorder)      !! sertraline (ZOLOFT) 50 MG tablet Take 1 tablet (50 mg) by mouth at bedtime  Qty: 90 tablet, Refills: 3    Comments: Takes with 25 mg for total of 75 mg. Needs to fill now  Associated Diagnoses: NICOLE (generalized anxiety disorder)      sevelamer carbonate (RENVELA) 800 MG tablet Take two with meals and one with snacks  Qty: 300 tablet, Refills: 11    Associated Diagnoses: CKD (chronic kidney disease) stage 5, GFR less than 15 ml/min (H)      tacrolimus (PROTOPIC) 0.1 % external ointment Apply topically 2 times daily. To skin folds  Qty: 60 g, Refills: 5    Associated Diagnoses: Inverse psoriasis      triamcinolone (KENALOG) 0.025 % external ointment Apply topically 2 times daily. To rash under breasts and groin as needed  Qty: 80 g, Refills: 11    Associated Diagnoses: Inverse psoriasis      Vitamin D3 50 mcg (2000 units) tablet TAKE ONE TABLET BY MOUTH ONCE DAILY  Qty: 90 tablet, Refills: 2    Associated Diagnoses: Vitamin D deficiency      blood glucose (NO BRAND SPECIFIED) test strip Use to test blood sugar 3 times daily or as directed.whatever is covered  Qty: 300 strip, Refills: 3    Associated Diagnoses: Type 2 diabetes mellitus with diabetic nephropathy, with long-term current use of insulin (H)      blood glucose (ONE TOUCH DELICA) lancing device Device to be used with lancets.needs lancets device for delica lancets  Qty: 1 each, Refills: 1    Associated Diagnoses: Type 2 diabetes mellitus with diabetic chronic kidney disease, unspecified CKD stage, unspecified whether long term insulin use (H)      blood glucose monitoring (NO BRAND SPECIFIED) meter device kit Use to test blood sugar 3 times daily or as directed. Whatever is covered  Qty: 1 kit,  "Refills: 1    Associated Diagnoses: Type 2 diabetes mellitus with diabetic nephropathy, with long-term current use of insulin (H)      blood glucose monitoring (ULTRA THIN 30G) lancets Use to test blood sugar 3 times daily or as directed.watever is covered  Qty: 300 each, Refills: 3    Associated Diagnoses: Type 2 diabetes mellitus with diabetic nephropathy, with long-term current use of insulin (H)      Continuous Blood Gluc  (FREESTYLE PHOENIX 2 READER) BELKYS Use to read blood sugars as per 's instructions.  Qty: 1 each, Refills: 0    Associated Diagnoses: Type 2 diabetes mellitus with diabetic neuropathy, with long-term current use of insulin (H)      !! Continuous Glucose Sensor (FREESTYLE PHOENIX 2 SENSOR) MISC Change every 14 days.  Qty: 6 each, Refills: 0    Associated Diagnoses: Type 2 diabetes mellitus with diabetic neuropathy, with long-term current use of insulin (H); Cellulitis of right lower extremity      !! Continuous Glucose Sensor (FREESTYLE PHOENIX 2 SENSOR) MISC Change every 14 days.  Qty: 6 each, Refills: 5    Associated Diagnoses: Type 2 diabetes mellitus with diabetic neuropathy, with long-term current use of insulin (H)      !! Continuous Glucose Sensor (FREESTYLE PHOENIX 2 SENSOR) MISC Change every 14 days.  Qty: 6 each, Refills: 5    Associated Diagnoses: Type 2 diabetes mellitus with diabetic neuropathy, with long-term current use of insulin (H)      insulin pen needle (BD PEN NEEDLE ALEX 2ND GEN) 32G X 4 MM miscellaneous USE 4 DAILY WITH INSULIN INJECTIONS.  Qty: 400 each, Refills: 1    Associated Diagnoses: Type 2 diabetes mellitus with hyperglycemia, with long-term current use of insulin (H)      !! order for DME Equipment being ordered: mattress overlay for hospital bed  Wt. 192#  Height 5'5\"  99 months/Lifetime  Qty: 1 Units, Refills: 0    Associated Diagnoses: CKD (chronic kidney disease) stage 5, GFR less than 15 ml/min (H); Immobility; Traumatic amputation of left lower " extremity above knee, subsequent encounter; Type 2 diabetes mellitus with diabetic neuropathy, with long-term current use of insulin (H)      !! order for DME 1 wheelchair  Qty: 1 Device, Refills: 0    Associated Diagnoses: Traumatic amputation of lower extremity above knee, unspecified laterality, subsequent encounter; CKD (chronic kidney disease) stage 3, GFR 30-59 ml/min (H); Type 2 diabetes mellitus with stage 3 chronic kidney disease, without long-term current use of insulin (H)       !! - Potential duplicate medications found. Please discuss with provider.        STOP taking these medications       furosemide (LASIX) 80 MG tablet Comments:   Reason for Stopping:             Allergies   Allergies   Allergen Reactions    Ampicillin-Sulbactam Sodium Rash     No evidence SJS, but very uncomfortable and precipitated multiple provider visits. Would not use penicillins again if other options available.     Penicillins Rash

## 2025-03-17 NOTE — PROGRESS NOTES
3-17-25 [id number] nursing shift update  Orientation: A&O x3. Disorientated to situation -with  intermittent confusion. Today she told her [facility] dialysis clinic on the phone, (while I was in room) that she would make her 2pm appt at their clinic. I had to interrupt her phone conversation to tell the clinic and remind the pt that dialysis will be done this am here in hospital  and when pt is discharged she'll return to clinic appointments as PTA her Mon - Wed- Friday schedule   Aggression Stop Light: Green  Activity: Ax2 w/ lift . 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 On reg diet with thin liquids during day. Refused breakfast. Remains on 1500 fluid restriction. BGM's to be ACHS   IV Access/Drains: 1 PIV SL, PEG tube clamped when not in used for meds or food/water flushes-  L AV fistula with + bruit/thrill  Pain Management: Denies pain.  Abnormal VS/Results: VSS on O2 at 4PLM via  NC except HTN at times. CPAP overnight.   Respiratory: Denies SOB at rest. O2 at 4LPM via nc  Bowel/Bladder: . Anuric pt on HD. Had BM last 3-16-25  Skin/Wounds: Scattered bruising, sacral wound mepilex changed.   Consults: Neph, SW  D/C Disposition: pending TCU bed  Other Info: Pt has dialysis MWF. Plan to discharge late afternoon after Dialysis. Transportation planned by SW for time range of 3:40-4:20pm on stretcher with O2    Other info   Speech recommending Upright position during and 60 minutes after oral intake. Slow pace with rest breaks as needed. Choose softer foods. Upper denture in place.

## 2025-03-17 NOTE — PROGRESS NOTES
Assessment and Plan:     End-stage renal disease on dialysis.  Seen during dialysis.  She gets dialysis .  Left arm fistula.Her last dialysis was 30/15/25.    Labs today show sodium 137, potassium 5.1, bicarbonate 31, creatinine 5.55.  Calcium 10.5, phosphorus 4.8, magnesium 2.5.    Today we will run her 3.5 hours, left arm fistula with 350 blood flow rate, 2-3 L ultrafiltration as tolerated.  K protocol, 35 bicarb, 138 sodium.  Low-dose heparin.  Midodrine 5 mg before dialysis.    Given her hypercalcemia we will reduce the dose of calcitriol.            Interval History:   Acute respiratory failure: Congestive heart failure, pulmonary hypertension, obstructive sleep apnea, COPD.      Anemia: Hemoglobin 8.6.  She is on Venofer and EPO IV.                Review of Systems:   Discharge plan to transitional care unit with continued dialysis.  With       Medications:      See list.          Physical Exam:   Vitals were reviewed  Patient Vitals for the past 24 hrs:   BP Temp Temp src Pulse Resp SpO2 Weight   25 1015 116/48 -- -- 62 21 100 % --   25 1000 119/49 -- -- 60 20 100 % --   25 0945 (!) 141/48 -- -- 61 26 100 % --   25 0930 (!) 153/49 -- -- 62 21 99 % --   25 0925 (!) 149/57 -- -- 61 20 99 % --   25 0919 (!) 118/99 98.3  F (36.8  C) Oral 68 26 97 % --   25 0744 (!) 153/45 97.9  F (36.6  C) Oral 70 16 92 % --   25 0639 -- -- -- -- -- -- 92.7 kg (204 lb 5.9 oz)   25 0553 133/62 98.2  F (36.8  C) Oral 78 16 97 % --   25 1929 137/43 98.4  F (36.9  C) Oral 74 15 99 % --   25 1547 136/44 98.5  F (36.9  C) Oral 69 15 98 % --   25 1144 (!) 143/41 98.3  F (36.8  C) Oral 68 15 98 % --       Temp:  [97.9  F (36.6  C)-98.5  F (36.9  C)] 98.3  F (36.8  C)  Pulse:  [60-78] 62  Resp:  [15-26] 21  BP: (116-153)/(41-99) 116/48  SpO2:  [92 %-100 %] 100 %    Temperatures:  Current - Temp: 98.3  F (36.8  C); Max - Temp  Av.3   F (36.8  C)  Min: 97.9  F (36.6  C)  Max: 98.5  F (36.9  C)  Respiration range: Resp  Av.2  Min: 15  Max: 26  Pulse range: Pulse  Av.6  Min: 60  Max: 78  Blood pressure range: Systolic (24hrs), Av , Min:116 , Max:153   ; Diastolic (24hrs), Av, Min:41, Max:99    Pulse oximetry range: SpO2  Av.1 %  Min: 92 %  Max: 100 %    I/O last 3 completed shifts:  In: 120 [NG/GT:120]  Out: -       Intake/Output Summary (Last 24 hours) at 3/17/2025 1028  Last data filed at 3/17/2025 0700  Gross per 24 hour   Intake 120 ml   Output --   Net 120 ml       Alert and responsive  LAF with needles in place.       Wt Readings from Last 4 Encounters:   25 92.7 kg (204 lb 5.9 oz)   25 101.8 kg (224 lb 6.9 oz)   25 92.5 kg (203 lb 14.8 oz)   24 101.1 kg (222 lb 14.2 oz)          Data:          Lab Results   Component Value Date     2025     03/15/2025     2025     2021     2021     2020    Lab Results   Component Value Date    CHLORIDE 96 2025    CHLORIDE 94 03/15/2025    CHLORIDE 92 2025    CHLORIDE 106 2021    CHLORIDE 109 2021    CHLORIDE 110 2021    CHLORIDE 105 2021    CHLORIDE 101 2021    CHLORIDE 106 2020    Lab Results   Component Value Date    BUN 66.3 2025    BUN 28.2 03/15/2025    BUN 46.5 2025    BUN 37 2021    BUN 69 2021    BUN 64 2021    BUN 68 2021    BUN 53 2021    BUN 44 2020      Lab Results   Component Value Date    POTASSIUM 5.1 2025    POTASSIUM 4.8 03/15/2025    POTASSIUM 4.7 2025    POTASSIUM 4.7 2022    POTASSIUM 3.8 2021    POTASSIUM 6.6 2021    POTASSIUM 4.2 2021    POTASSIUM 3.8 2021    POTASSIUM 3.9 2021    Lab Results   Component Value Date    CO2 31 2025    CO2 30 03/15/2025    CO2 29 2025    CO2 31 2021    CO2 23 2021    CO2 24  11/22/2021    CO2 23 04/17/2021    CO2 22 04/09/2021    CO2 29 11/18/2020    Lab Results   Component Value Date    CR 5.55 03/17/2025    CR 2.72 03/15/2025    CR 4.07 03/14/2025    CR 5.98 04/17/2021    CR 4.35 04/16/2021    CR 5.28 04/09/2021        Recent Labs   Lab Test 03/15/25  0626 03/13/25  0917 03/12/25  0603 03/11/25  0702 03/10/25  1630 03/10/25  0642 03/09/25  1050   WBC  --   --   --  5.8  --  5.2 5.6   HGB 8.6* 8.7* 8.3* 8.5*   < > 6.8* 7.0*   HCT  --   --   --  27.4*  --  21.9* 23.1*   MCV  --   --   --  97  --  99 100   PLT  --   --   --  170  --  143* 158    < > = values in this interval not displayed.     Recent Labs   Lab Test 03/10/25  0642 02/05/25 2004 02/02/25  0542   AST 12 17 12   ALT 9 24 22   ALKPHOS 73 75 55   BILITOTAL 0.3 0.3 0.2       Recent Labs   Lab Test 03/17/25  0936 03/09/25  1301 03/03/25  0937   MAG 2.5* 2.3 2.3     Recent Labs   Lab Test 03/17/25  0936 03/09/25  1301 03/03/25  0937   PHOS 4.8* 4.6* 5.0*     Recent Labs   Lab Test 03/17/25  0936 03/15/25  0626 03/14/25  1014   JODY 10.5* 10.5* 10.1       Lab Results   Component Value Date    JODY 10.5 (H) 03/17/2025     Lab Results   Component Value Date    WBC 5.8 03/11/2025    HGB 8.6 (L) 03/15/2025    HCT 27.4 (L) 03/11/2025    MCV 97 03/11/2025     03/11/2025     Lab Results   Component Value Date     03/17/2025    POTASSIUM 5.1 03/17/2025    CHLORIDE 96 (L) 03/17/2025    CO2 31 (H) 03/17/2025     (H) 03/17/2025     Lab Results   Component Value Date    BUN 66.3 (H) 03/17/2025    CR 5.55 (H) 03/17/2025     Lab Results   Component Value Date    MAG 2.5 (H) 03/17/2025     Lab Results   Component Value Date    PHOS 4.8 (H) 03/17/2025       Creatinine   Date Value Ref Range Status   03/17/2025 5.55 (H) 0.51 - 0.95 mg/dL Final   03/15/2025 2.72 (H) 0.51 - 0.95 mg/dL Final   03/14/2025 4.07 (H) 0.51 - 0.95 mg/dL Final   03/11/2025 2.89 (H) 0.51 - 0.95 mg/dL Final   03/10/2025 4.79 (H) 0.51 - 0.95 mg/dL Final    03/09/2025 3.78 (H) 0.51 - 0.95 mg/dL Final   04/17/2021 5.98 (H) 0.52 - 1.04 mg/dL Final   04/16/2021 4.35 (H) 0.52 - 1.04 mg/dL Final   04/09/2021 5.28 (H) 0.52 - 1.04 mg/dL Final   11/18/2020 4.23 (H) 0.52 - 1.04 mg/dL Final   11/16/2020 5.08 (H) 0.52 - 1.04 mg/dL Final   09/25/2020 6.87 (H) 0.52 - 1.04 mg/dL Final       Attestation:  I have reviewed today's vital signs, notes, medications, labs and imaging.  Seen on dialysis.     Braden Oneill MD

## 2025-03-17 NOTE — PLAN OF CARE
Shift: 1900-0730  Primary Diagnosis: acute Hypoxic Respiratory Failure    Orientation: A&O x4, forgetful at times  Aggression Stop Light: Green  Activity: Ax2 w/ lift  Diet/BS Checks: Regular, thin liquids and FR 1500. TF at night 8PM to 6AM, BG ACHS   Tele: N/A  IV Access/Drains: R PIV SL, PEG tube clamped, L AV fistula  Pain Management: Denied  Abnormal VS/Results: VSS on 3L O2 NC, wears CPAP overnight.   Bowel/Bladder: 1 BM using bed pan. Anuric pt on HD.  Skin/Wounds: Scattered bruising, sacral wound mepilex changed.   Consults: Nephrology, SW  D/C Disposition: Pending TCU bed availability  Other Info:   -Pt has dialysis today

## 2025-03-17 NOTE — PROGRESS NOTES
Care Management Follow Up    Length of Stay (days): 8    Expected Discharge Date: 03/17/2025     Concerns to be Addressed: discharge planning     Patient plan of care discussed at interdisciplinary rounds: Yes    Anticipated Discharge Disposition: Transitional Care              Anticipated Discharge Services:  Outpatient Dialysis at [facility] [address] [facility]  Anticipated Discharge DME:      Patient/family educated on Medicare website which has current facility and service quality ratings: no  Education Provided on the Discharge Plan: Yes  Patient/Family in Agreement with the Plan: yes    Referrals Placed by CM/SW: Post Acute Facilities  Private pay costs discussed: Not applicable    Discussed  Partnership in Safe Discharge Planning  document with patient/family: No     Handoff Completed: no    Additional Information:  Per Juan JEFF, patient is returning to [facility] and will be resuming outpt Dialysis at [facility] [address] [facility] on [facility].  Called [facility] and spoke to Eren Townsend RN.  Informed Kristian that patient will resume outpt dialysis on Wednesday.  Kristian requested discharge summary be faxed to [phone] when it's available.     Next Steps: Await discharge orders to be faxed.      SHANTA Salcedo RN, BSN, OCN   Inpatient Care Coordination [address]  Office: [phone]

## 2025-03-18 ENCOUNTER — TELEPHONE (OUTPATIENT)
Dept: ENDOCRINOLOGY | Facility: CLINIC | Age: 75
End: 2025-03-18
Payer: COMMERCIAL

## 2025-03-18 LAB
ACID FAST STAIN (ARUP): NORMAL

## 2025-03-18 NOTE — TELEPHONE ENCOUNTER
Patient Contacted (daughter) for the patient to call back and schedule the following:    Appointment type: RETURN DIABETES  Provider: Arabella Kamara PA-C  Return date: Yani 4/2/25  Specialty phone number: [phone]  Additional appointment(s) needed: N/A  Additonal Notes:  Spoke to pt's daughter and she was just getting on a call so asked to have us call back to reschedule. [email or web address]    Hernán Sparks on 3/18/2025 at 3:35 PM

## 2025-03-20 PROBLEM — N17.9 ACUTE KIDNEY FAILURE, UNSPECIFIED: Status: ACTIVE | Noted: 2025-03-20

## 2025-03-20 NOTE — TELEPHONE ENCOUNTER
LVM (2nd Attempt) Contacted patient's (daughter) (1st Attempt) for the patient to call back and schedule the following:     Appointment type: RETURN DIABETES  Provider: Arabella Kamara PA-C  Return date: Yani 4/2/25  Specialty phone number: [phone]  Additional appointment(s) needed: N/A  Additonal Notes:  Spoke to pt's daughter 1st attempt and she was just getting on a call so asked to have us call back to reschedule. [email or web address], [email or web address] for my 2nd attempt to reschedule this appointment.  Needs to get rescheduled due to a change in the providers schedule.  This appointment is now canceled.    Hernán Sparks on 3/20/2025 at 9:24 AM

## 2025-03-25 ENCOUNTER — MYC MEDICAL ADVICE (OUTPATIENT)
Dept: PHARMACY | Facility: CLINIC | Age: 75
End: 2025-03-25

## 2025-03-25 ENCOUNTER — OFFICE VISIT (OUTPATIENT)
Dept: CARDIOLOGY | Facility: CLINIC | Age: 75
End: 2025-03-25
Attending: NURSE PRACTITIONER
Payer: COMMERCIAL

## 2025-03-25 VITALS
SYSTOLIC BLOOD PRESSURE: 153 MMHG | HEIGHT: 66 IN | HEART RATE: 69 BPM | BODY MASS INDEX: 32.78 KG/M2 | DIASTOLIC BLOOD PRESSURE: 60 MMHG | OXYGEN SATURATION: 97 % | WEIGHT: 204 LBS

## 2025-03-25 DIAGNOSIS — I48.19 PERSISTENT ATRIAL FIBRILLATION (H): Primary | ICD-10-CM

## 2025-03-25 DIAGNOSIS — R06.00 DYSPNEA, UNSPECIFIED TYPE: ICD-10-CM

## 2025-03-25 DIAGNOSIS — I50.23 ACUTE ON CHRONIC SYSTOLIC CONGESTIVE HEART FAILURE (H): ICD-10-CM

## 2025-03-25 DIAGNOSIS — N18.6 ESRD (END STAGE RENAL DISEASE) ON DIALYSIS (H): ICD-10-CM

## 2025-03-25 DIAGNOSIS — Z99.2 ESRD (END STAGE RENAL DISEASE) ON DIALYSIS (H): ICD-10-CM

## 2025-03-25 DIAGNOSIS — Z79.899 LONG TERM CURRENT USE OF AMIODARONE: ICD-10-CM

## 2025-03-25 DIAGNOSIS — I27.20 PULMONARY HYPERTENSION (H): Chronic | ICD-10-CM
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
## 2025-03-25 NOTE — PATIENT INSTRUCTIONS
Call my nurse with any questions or concerns:  [phone]  *If you have concerns after hours, please call [phone], option 2 to speak with on call Cardiologist.    1. Medication changes from today:    Continue amiodarone 200 mg every day   Kurt Lerner MD (pulmonologist)   [phone]   or make an appointment with sleep doctor ANTHONY Roman (nephrology)  Phone: [phone]

## 2025-03-27 ENCOUNTER — HOSPITAL ENCOUNTER (OUTPATIENT)
Facility: CLINIC | Age: 75
Setting detail: OBSERVATION
End: 2025-03-27
Attending: EMERGENCY MEDICINE | Admitting: STUDENT IN AN ORGANIZED HEALTH CARE EDUCATION/TRAINING PROGRAM
Payer: COMMERCIAL

## 2025-03-27 ENCOUNTER — APPOINTMENT (OUTPATIENT)
Dept: CT IMAGING | Facility: CLINIC | Age: 75
End: 2025-03-27
Attending: EMERGENCY MEDICINE
Payer: COMMERCIAL

## 2025-03-27 ENCOUNTER — MEDICAL CORRESPONDENCE (OUTPATIENT)
Dept: HEALTH INFORMATION MANAGEMENT | Facility: CLINIC | Age: 75
End: 2025-03-27

## 2025-03-27 DIAGNOSIS — R05.9 COUGH: ICD-10-CM

## 2025-03-27 DIAGNOSIS — U07.1 PNEUMONIA DUE TO COVID-19 VIRUS: ICD-10-CM

## 2025-03-27 DIAGNOSIS — R11.0 NAUSEA: ICD-10-CM

## 2025-03-27 DIAGNOSIS — J10.1 INFLUENZA A: ICD-10-CM

## 2025-03-27 DIAGNOSIS — R53.1 GENERALIZED WEAKNESS: ICD-10-CM

## 2025-03-27 DIAGNOSIS — J96.21 ACUTE AND CHRONIC RESPIRATORY FAILURE WITH HYPOXIA (H): ICD-10-CM

## 2025-03-27 DIAGNOSIS — G47.33 OSA (OBSTRUCTIVE SLEEP APNEA): ICD-10-CM

## 2025-03-27 DIAGNOSIS — F33.9 EPISODE OF RECURRENT MAJOR DEPRESSIVE DISORDER, UNSPECIFIED DEPRESSION EPISODE SEVERITY: ICD-10-CM

## 2025-03-27 DIAGNOSIS — A41.9 SEVERE SEPSIS WITH ACUTE ORGAN DYSFUNCTION (H): ICD-10-CM

## 2025-03-27 DIAGNOSIS — I27.20 PULMONARY HYPERTENSION (H): Chronic | ICD-10-CM

## 2025-03-27 DIAGNOSIS — N17.9 ACUTE KIDNEY FAILURE, UNSPECIFIED: ICD-10-CM

## 2025-03-27 DIAGNOSIS — L30.4 INTERTRIGO: ICD-10-CM

## 2025-03-27 DIAGNOSIS — N18.6 ESRD (END STAGE RENAL DISEASE) ON DIALYSIS (H): ICD-10-CM

## 2025-03-27 DIAGNOSIS — J98.11 COLLAPSE OF RIGHT LUNG: ICD-10-CM

## 2025-03-27 DIAGNOSIS — B34.8 PARAINFLUENZA: ICD-10-CM

## 2025-03-27 DIAGNOSIS — R65.20 SEVERE SEPSIS WITH ACUTE ORGAN DYSFUNCTION (H): ICD-10-CM

## 2025-03-27 DIAGNOSIS — Z45.2 ENCOUNTER FOR ADJUSTMENT AND MANAGEMENT OF VASCULAR ACCESS DEVICE: ICD-10-CM

## 2025-03-27 DIAGNOSIS — J30.2 SEASONAL ALLERGIC RHINITIS, UNSPECIFIED TRIGGER: ICD-10-CM

## 2025-03-27 DIAGNOSIS — E11.9 TYPE 2 DIABETES, HBA1C GOAL < 8% (H): ICD-10-CM

## 2025-03-27 DIAGNOSIS — N18.6 ESRD ON HEMODIALYSIS (H): Primary | ICD-10-CM

## 2025-03-27 DIAGNOSIS — N18.6 ESRD ON DIALYSIS (H): ICD-10-CM

## 2025-03-27 DIAGNOSIS — J40 BRONCHITIS: ICD-10-CM

## 2025-03-27 DIAGNOSIS — Z99.2 ESRD ON DIALYSIS (H): ICD-10-CM

## 2025-03-27 DIAGNOSIS — Z79.4 TYPE 2 DIABETES MELLITUS WITH HYPERGLYCEMIA, WITH LONG-TERM CURRENT USE OF INSULIN (H): ICD-10-CM

## 2025-03-27 DIAGNOSIS — K21.00 GASTROESOPHAGEAL REFLUX DISEASE WITH ESOPHAGITIS WITHOUT HEMORRHAGE: ICD-10-CM

## 2025-03-27 DIAGNOSIS — D72.829 LEUKOCYTOSIS, UNSPECIFIED TYPE: ICD-10-CM

## 2025-03-27 DIAGNOSIS — K59.00 CONSTIPATION, UNSPECIFIED CONSTIPATION TYPE: ICD-10-CM

## 2025-03-27 DIAGNOSIS — E78.00 HYPERCHOLESTEROLEMIA: ICD-10-CM

## 2025-03-27 DIAGNOSIS — E11.65 TYPE 2 DIABETES MELLITUS WITH HYPERGLYCEMIA, WITH LONG-TERM CURRENT USE OF INSULIN (H): ICD-10-CM

## 2025-03-27 DIAGNOSIS — I48.0 PAROXYSMAL ATRIAL FIBRILLATION (H): ICD-10-CM

## 2025-03-27 DIAGNOSIS — Z99.2 ESRD (END STAGE RENAL DISEASE) ON DIALYSIS (H): ICD-10-CM

## 2025-03-27 DIAGNOSIS — N18.6 ESRD (END STAGE RENAL DISEASE) (H): ICD-10-CM

## 2025-03-27 DIAGNOSIS — I10 HYPERTENSION GOAL BP (BLOOD PRESSURE) < 140/90: ICD-10-CM

## 2025-03-27 DIAGNOSIS — E55.9 VITAMIN D DEFICIENCY: ICD-10-CM

## 2025-03-27 DIAGNOSIS — T17.500A MUCUS PLUGGING OF BRONCHI: ICD-10-CM

## 2025-03-27 DIAGNOSIS — J12.82 PNEUMONIA DUE TO COVID-19 VIRUS: ICD-10-CM

## 2025-03-27 DIAGNOSIS — Z99.2 ESRD ON HEMODIALYSIS (H): Primary | ICD-10-CM

## 2025-03-27 DIAGNOSIS — R06.00 DYSPNEA, UNSPECIFIED TYPE: ICD-10-CM

## 2025-03-27 DIAGNOSIS — Z93.1 G TUBE FEEDINGS (H): ICD-10-CM

## 2025-03-27 LAB
ALBUMIN SERPL BCG-MCNC: 3.7 G/DL (ref 3.5–5.2)
ALBUMIN UR-MCNC: ABNORMAL G/DL
ALP SERPL-CCNC: 92 U/L (ref 40–150)
ALT SERPL W P-5'-P-CCNC: 22 U/L (ref 0–50)
ANION GAP SERPL CALCULATED.3IONS-SCNC: 13 MMOL/L (ref 7–15)
APPEARANCE UR: ABNORMAL
AST SERPL W P-5'-P-CCNC: 16 U/L (ref 0–45)
BACTERIA #/AREA URNS HPF: ABNORMAL /HPF
BASOPHILS # BLD AUTO: 0.1 10E3/UL (ref 0–0.2)
BASOPHILS NFR BLD AUTO: 0 %
BILIRUB SERPL-MCNC: 0.4 MG/DL
BILIRUB UR QL STRIP: ABNORMAL
BUN SERPL-MCNC: 33.6 MG/DL (ref 8–23)
CALCIUM SERPL-MCNC: 10.9 MG/DL (ref 8.8–10.4)
CHLORIDE SERPL-SCNC: 97 MMOL/L (ref 98–107)
COLOR UR AUTO: ABNORMAL
CREAT SERPL-MCNC: 2.67 MG/DL (ref 0.51–0.95)
CRP SERPL-MCNC: 39.78 MG/L
EGFRCR SERPLBLD CKD-EPI 2021: 18 ML/MIN/1.73M2
EOSINOPHIL # BLD AUTO: 0.1 10E3/UL (ref 0–0.7)
EOSINOPHIL NFR BLD AUTO: 1 %
ERYTHROCYTE [DISTWIDTH] IN BLOOD BY AUTOMATED COUNT: 15.1 % (ref 10–15)
FLUAV RNA SPEC QL NAA+PROBE: NEGATIVE
FLUBV RNA RESP QL NAA+PROBE: NEGATIVE
GLUCOSE BLDC GLUCOMTR-MCNC: 112 MG/DL (ref 70–99)
GLUCOSE SERPL-MCNC: 232 MG/DL (ref 70–99)
GLUCOSE UR STRIP-MCNC: ABNORMAL MG/DL
HCO3 SERPL-SCNC: 26 MMOL/L (ref 22–29)
HCT VFR BLD AUTO: 29.2 % (ref 35–47)
HGB BLD-MCNC: 9.2 G/DL (ref 11.7–15.7)
HGB UR QL STRIP: ABNORMAL
HOLD SPECIMEN: NORMAL
HOLD SPECIMEN: NORMAL
IMM GRANULOCYTES # BLD: 0.5 10E3/UL
IMM GRANULOCYTES NFR BLD: 3 %
KETONES UR STRIP-MCNC: ABNORMAL MG/DL
LEUKOCYTE ESTERASE UR QL STRIP: ABNORMAL
LYMPHOCYTES # BLD AUTO: 1.3 10E3/UL (ref 0.8–5.3)
LYMPHOCYTES NFR BLD AUTO: 7 %
MCH RBC QN AUTO: 30.2 PG (ref 26.5–33)
MCHC RBC AUTO-ENTMCNC: 31.5 G/DL (ref 31.5–36.5)
MCV RBC AUTO: 96 FL (ref 78–100)
MONOCYTES # BLD AUTO: 1.1 10E3/UL (ref 0–1.3)
MONOCYTES NFR BLD AUTO: 7 %
MUCOUS THREADS #/AREA URNS LPF: PRESENT /LPF
NEUTROPHILS # BLD AUTO: 14.4 10E3/UL (ref 1.6–8.3)
NEUTROPHILS NFR BLD AUTO: 83 %
NITRATE UR QL: ABNORMAL
NRBC # BLD AUTO: 0 10E3/UL
NRBC BLD AUTO-RTO: 0 /100
NT-PROBNP SERPL-MCNC: ABNORMAL PG/ML (ref 0–1800)
PH UR STRIP: ABNORMAL [PH]
PLATELET # BLD AUTO: 224 10E3/UL (ref 150–450)
POTASSIUM SERPL-SCNC: 3.6 MMOL/L (ref 3.4–5.3)
PROCALCITONIN SERPL IA-MCNC: 1.3 NG/ML
PROT SERPL-MCNC: 6.9 G/DL (ref 6.4–8.3)
RBC # BLD AUTO: 3.05 10E6/UL (ref 3.8–5.2)
RBC URINE: >100 /HPF
RSV RNA SPEC NAA+PROBE: NEGATIVE
SARS-COV-2 RNA RESP QL NAA+PROBE: NEGATIVE
SODIUM SERPL-SCNC: 136 MMOL/L (ref 135–145)
SP GR UR STRIP: ABNORMAL
TROPONIN T SERPL HS-MCNC: 85 NG/L
TROPONIN T SERPL HS-MCNC: 85 NG/L
UROBILINOGEN UR STRIP-MCNC: ABNORMAL MG/DL
WBC # BLD AUTO: 17.4 10E3/UL (ref 4–11)
WBC CLUMPS #/AREA URNS HPF: PRESENT /HPF
WBC URINE: >100 /HPF

## 2025-03-27 PROCEDURE — 86140 C-REACTIVE PROTEIN: CPT | Performed by: EMERGENCY MEDICINE

## 2025-03-27 PROCEDURE — 87040 BLOOD CULTURE FOR BACTERIA: CPT | Performed by: STUDENT IN AN ORGANIZED HEALTH CARE EDUCATION/TRAINING PROGRAM

## 2025-03-27 PROCEDURE — 84484 ASSAY OF TROPONIN QUANT: CPT | Performed by: EMERGENCY MEDICINE

## 2025-03-27 PROCEDURE — 87186 SC STD MICRODIL/AGAR DIL: CPT | Performed by: EMERGENCY MEDICINE

## 2025-03-27 PROCEDURE — 250N000012 HC RX MED GY IP 250 OP 636 PS 637: Performed by: STUDENT IN AN ORGANIZED HEALTH CARE EDUCATION/TRAINING PROGRAM

## 2025-03-27 PROCEDURE — 36415 COLL VENOUS BLD VENIPUNCTURE: CPT | Performed by: EMERGENCY MEDICINE

## 2025-03-27 PROCEDURE — 87149 DNA/RNA DIRECT PROBE: CPT | Performed by: EMERGENCY MEDICINE

## 2025-03-27 PROCEDURE — 87637 SARSCOV2&INF A&B&RSV AMP PRB: CPT | Performed by: EMERGENCY MEDICINE

## 2025-03-27 PROCEDURE — 84145 PROCALCITONIN (PCT): CPT | Performed by: EMERGENCY MEDICINE

## 2025-03-27 PROCEDURE — G0378 HOSPITAL OBSERVATION PER HR: HCPCS

## 2025-03-27 PROCEDURE — 999N000127 HC STATISTIC PERIPHERAL IV START W US GUIDANCE

## 2025-03-27 PROCEDURE — 81001 URINALYSIS AUTO W/SCOPE: CPT | Performed by: EMERGENCY MEDICINE

## 2025-03-27 PROCEDURE — 99223 1ST HOSP IP/OBS HIGH 75: CPT | Performed by: STUDENT IN AN ORGANIZED HEALTH CARE EDUCATION/TRAINING PROGRAM

## 2025-03-27 PROCEDURE — 85025 COMPLETE CBC W/AUTO DIFF WBC: CPT | Performed by: EMERGENCY MEDICINE

## 2025-03-27 PROCEDURE — 99214 OFFICE O/P EST MOD 30 MIN: CPT | Performed by: INTERNAL MEDICINE

## 2025-03-27 PROCEDURE — 84155 ASSAY OF PROTEIN SERUM: CPT | Performed by: EMERGENCY MEDICINE

## 2025-03-27 PROCEDURE — 82962 GLUCOSE BLOOD TEST: CPT

## 2025-03-27 PROCEDURE — 71250 CT THORAX DX C-: CPT

## 2025-03-27 PROCEDURE — 250N000013 HC RX MED GY IP 250 OP 250 PS 637: Performed by: STUDENT IN AN ORGANIZED HEALTH CARE EDUCATION/TRAINING PROGRAM

## 2025-03-27 PROCEDURE — 83880 ASSAY OF NATRIURETIC PEPTIDE: CPT | Performed by: EMERGENCY MEDICINE

## 2025-03-27 PROCEDURE — 3E0G76Z INTRODUCTION OF NUTRITIONAL SUBSTANCE INTO UPPER GI, VIA NATURAL OR ARTIFICIAL OPENING: ICD-10-PCS | Performed by: STUDENT IN AN ORGANIZED HEALTH CARE EDUCATION/TRAINING PROGRAM

## 2025-03-27 PROCEDURE — 74176 CT ABD & PELVIS W/O CONTRAST: CPT

## 2025-03-27 PROCEDURE — 999N000040 HC STATISTIC CONSULT NO CHARGE VASC ACCESS

## 2025-03-27 PROCEDURE — 99285 EMERGENCY DEPT VISIT HI MDM: CPT | Mod: 25

## 2025-03-27 RX ORDER — MIDODRINE HYDROCHLORIDE 10 MG/1
10 TABLET ORAL
Status: DISCONTINUED | OUTPATIENT
Start: 2025-03-28 | End: 2025-03-27

## 2025-03-27 RX ORDER — HYDRALAZINE HYDROCHLORIDE 20 MG/ML
5 INJECTION INTRAMUSCULAR; INTRAVENOUS EVERY 4 HOURS PRN
Status: DISCONTINUED | OUTPATIENT
Start: 2025-03-27 | End: 2025-04-17 | Stop reason: HOSPADM

## 2025-03-27 RX ORDER — AMIODARONE HYDROCHLORIDE 200 MG/1
200 TABLET ORAL 2 TIMES DAILY
Status: DISCONTINUED | OUTPATIENT
Start: 2025-03-27 | End: 2025-04-09

## 2025-03-27 RX ORDER — SEVELAMER CARBONATE 800 MG/1
1600 TABLET, FILM COATED ORAL
Status: DISCONTINUED | OUTPATIENT
Start: 2025-03-28 | End: 2025-04-16

## 2025-03-27 RX ORDER — PROCHLORPERAZINE MALEATE 5 MG/1
5 TABLET ORAL EVERY 6 HOURS PRN
Status: DISCONTINUED | OUTPATIENT
Start: 2025-03-27 | End: 2025-04-17 | Stop reason: HOSPADM

## 2025-03-27 RX ORDER — AMOXICILLIN 250 MG
1 CAPSULE ORAL 2 TIMES DAILY PRN
Status: DISCONTINUED | OUTPATIENT
Start: 2025-03-27 | End: 2025-04-17 | Stop reason: HOSPADM

## 2025-03-27 RX ORDER — CEFTRIAXONE 1 G/1
1 INJECTION, POWDER, FOR SOLUTION INTRAMUSCULAR; INTRAVENOUS EVERY 24 HOURS
Status: CANCELLED | OUTPATIENT
Start: 2025-03-28

## 2025-03-27 RX ORDER — AMLODIPINE BESYLATE 10 MG/1
10 TABLET ORAL
Status: DISCONTINUED | OUTPATIENT
Start: 2025-03-29 | End: 2025-04-17 | Stop reason: HOSPADM

## 2025-03-27 RX ORDER — CHOLECALCIFEROL (VITAMIN D3) 50 MCG
50 TABLET ORAL DAILY
Status: DISCONTINUED | OUTPATIENT
Start: 2025-03-28 | End: 2025-03-30

## 2025-03-27 RX ORDER — NALOXONE HYDROCHLORIDE 0.4 MG/ML
0.4 INJECTION, SOLUTION INTRAMUSCULAR; INTRAVENOUS; SUBCUTANEOUS
Status: DISCONTINUED | OUTPATIENT
Start: 2025-03-27 | End: 2025-04-17 | Stop reason: HOSPADM

## 2025-03-27 RX ORDER — SEVELAMER CARBONATE 800 MG/1
800 TABLET, FILM COATED ORAL 3 TIMES DAILY PRN
Status: ON HOLD | COMMUNITY
End: 2025-04-17

## 2025-03-27 RX ORDER — NALOXONE HYDROCHLORIDE 0.4 MG/ML
0.2 INJECTION, SOLUTION INTRAMUSCULAR; INTRAVENOUS; SUBCUTANEOUS
Status: DISCONTINUED | OUTPATIENT
Start: 2025-03-27 | End: 2025-04-17 | Stop reason: HOSPADM

## 2025-03-27 RX ORDER — ONDANSETRON 4 MG/1
4 TABLET, ORALLY DISINTEGRATING ORAL EVERY 6 HOURS PRN
Status: DISCONTINUED | OUTPATIENT
Start: 2025-03-27 | End: 2025-04-17 | Stop reason: HOSPADM

## 2025-03-27 RX ORDER — NICOTINE POLACRILEX 4 MG
15-30 LOZENGE BUCCAL
Status: DISCONTINUED | OUTPATIENT
Start: 2025-03-27 | End: 2025-04-17 | Stop reason: HOSPADM

## 2025-03-27 RX ORDER — CALCITRIOL 0.25 UG/1
0.25 CAPSULE, LIQUID FILLED ORAL
Status: DISCONTINUED | OUTPATIENT
Start: 2025-03-28 | End: 2025-04-11

## 2025-03-27 RX ORDER — ONDANSETRON 4 MG/1
4 TABLET, ORALLY DISINTEGRATING ORAL EVERY 6 HOURS PRN
Status: ON HOLD | COMMUNITY
End: 2025-04-17

## 2025-03-27 RX ORDER — POLYETHYLENE GLYCOL 3350 17 G/17G
17 POWDER, FOR SOLUTION ORAL 2 TIMES DAILY
Status: DISCONTINUED | OUTPATIENT
Start: 2025-03-27 | End: 2025-03-30

## 2025-03-27 RX ORDER — BISACODYL 10 MG
10 SUPPOSITORY, RECTAL RECTAL ONCE
Status: COMPLETED | OUTPATIENT
Start: 2025-03-27 | End: 2025-03-27

## 2025-03-27 RX ORDER — CETIRIZINE HYDROCHLORIDE 10 MG/1
10 TABLET ORAL AT BEDTIME
Status: DISCONTINUED | OUTPATIENT
Start: 2025-03-27 | End: 2025-03-30

## 2025-03-27 RX ORDER — MIDODRINE HYDROCHLORIDE 5 MG/1
5 TABLET ORAL
Status: DISCONTINUED | OUTPATIENT
Start: 2025-03-28 | End: 2025-04-17 | Stop reason: HOSPADM

## 2025-03-27 RX ORDER — SENNOSIDES 8.6 MG
2 TABLET ORAL 2 TIMES DAILY
Status: DISCONTINUED | OUTPATIENT
Start: 2025-03-27 | End: 2025-04-17 | Stop reason: HOSPADM

## 2025-03-27 RX ORDER — ATORVASTATIN CALCIUM 20 MG/1
20 TABLET, FILM COATED ORAL EVERY EVENING
Status: DISCONTINUED | OUTPATIENT
Start: 2025-03-27 | End: 2025-04-17 | Stop reason: HOSPADM

## 2025-03-27 RX ORDER — ONDANSETRON 2 MG/ML
4 INJECTION INTRAMUSCULAR; INTRAVENOUS EVERY 6 HOURS PRN
Status: DISCONTINUED | OUTPATIENT
Start: 2025-03-27 | End: 2025-04-17 | Stop reason: HOSPADM

## 2025-03-27 RX ORDER — ACETAMINOPHEN 325 MG/1
650 TABLET ORAL EVERY 4 HOURS PRN
Status: DISCONTINUED | OUTPATIENT
Start: 2025-03-27 | End: 2025-04-17 | Stop reason: HOSPADM

## 2025-03-27 RX ORDER — HYDROMORPHONE HYDROCHLORIDE 2 MG/1
2 TABLET ORAL EVERY 4 HOURS PRN
Status: DISCONTINUED | OUTPATIENT
Start: 2025-03-27 | End: 2025-04-09

## 2025-03-27 RX ORDER — CEFTRIAXONE 2 G/1
2 INJECTION, POWDER, FOR SOLUTION INTRAMUSCULAR; INTRAVENOUS ONCE
Status: DISCONTINUED | OUTPATIENT
Start: 2025-03-27 | End: 2025-03-27

## 2025-03-27 RX ORDER — DEXTROSE MONOHYDRATE 25 G/50ML
25-50 INJECTION, SOLUTION INTRAVENOUS
Status: DISCONTINUED | OUTPATIENT
Start: 2025-03-27 | End: 2025-04-17 | Stop reason: HOSPADM

## 2025-03-27 RX ORDER — IPRATROPIUM BROMIDE AND ALBUTEROL SULFATE 2.5; .5 MG/3ML; MG/3ML
3 SOLUTION RESPIRATORY (INHALATION) EVERY 4 HOURS PRN
Status: DISCONTINUED | OUTPATIENT
Start: 2025-03-27 | End: 2025-04-17 | Stop reason: HOSPADM

## 2025-03-27 RX ORDER — BISACODYL 10 MG
10 SUPPOSITORY, RECTAL RECTAL DAILY PRN
Status: DISCONTINUED | OUTPATIENT
Start: 2025-03-28 | End: 2025-04-17 | Stop reason: HOSPADM

## 2025-03-27 RX ORDER — AMOXICILLIN 250 MG
2 CAPSULE ORAL 2 TIMES DAILY PRN
Status: DISCONTINUED | OUTPATIENT
Start: 2025-03-27 | End: 2025-04-17 | Stop reason: HOSPADM

## 2025-03-27 RX ORDER — ALBUTEROL SULFATE 90 UG/1
2 INHALANT RESPIRATORY (INHALATION) EVERY 6 HOURS PRN
Status: DISCONTINUED | OUTPATIENT
Start: 2025-03-27 | End: 2025-04-17 | Stop reason: HOSPADM

## 2025-03-27 RX ADMIN — INSULIN GLARGINE 15 UNITS: 100 INJECTION, SOLUTION SUBCUTANEOUS at 23:01

## 2025-03-27 RX ADMIN — CETIRIZINE HYDROCHLORIDE 10 MG: 10 TABLET, FILM COATED ORAL at 22:26

## 2025-03-27 RX ADMIN — ATORVASTATIN CALCIUM 20 MG: 20 TABLET, FILM COATED ORAL at 22:25

## 2025-03-27 RX ADMIN — HYDROMORPHONE HYDROCHLORIDE 2 MG: 2 TABLET ORAL at 22:26

## 2025-03-27 RX ADMIN — AMIODARONE HYDROCHLORIDE 200 MG: 200 TABLET ORAL at 22:27

## 2025-03-27 RX ADMIN — APIXABAN 5 MG: 5 TABLET, FILM COATED ORAL at 22:27

## 2025-03-27 RX ADMIN — HYDRALAZINE HYDROCHLORIDE 75 MG: 25 TABLET ORAL at 22:26

## 2025-03-27 RX ADMIN — BISACODYL 10 MG: 10 SUPPOSITORY RECTAL at 18:56

## 2025-03-27 ASSESSMENT — ACTIVITIES OF DAILY LIVING (ADL)
ADLS_ACUITY_SCORE: 63

## 2025-03-27 NOTE — ED NOTES
Bed: ED05  Expected date: 3/27/25  Expected time: 11:51 AM  Means of arrival:   Comments:  Intake 2
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
## 2025-03-27 NOTE — PHARMACY-ADMISSION MEDICATION HISTORY
Pharmacist Admission Medication History    Admission medication history is complete. The information provided in this note is only as accurate as the sources available at the time of the update.    Information Source(s): Facility ([facility] medication list/MAR ([facility]) via N/A    Pertinent Information: None    Changes made to PTA medication list:  Added: ondansetron  Deleted: novolog  Changed: None    Allergies reviewed with patient and updates made in EHR: no    Medication History Completed By: Truman Chao RPH 3/27/2025 1:46 PM    PTA Med List   Medication Sig Last Dose/Taking    acetaminophen (TYLENOL) 325 MG tablet Take 2 tablets (650 mg) by mouth every 4 hours as needed for mild pain Taking As Needed    albuterol (PROAIR HFA/PROVENTIL HFA/VENTOLIN HFA) 108 (90 Base) MCG/ACT inhaler Inhale 2 puffs into the lungs every 6 hours as needed for shortness of breath / dyspnea or wheezing Taking As Needed    amiodarone (PACERONE) 200 MG tablet Take 1 tablet (200 mg) by mouth or Feeding Tube 2 times daily. 3/26/2025 Morning    amLODIPine (NORVASC) 10 MG tablet Take 1 tablet (10 mg) by mouth or Feeding Tube daily. Take 1 table  by mouth once daily on every Tue, Thu, Sa, Sun 3/25/2025    apixaban ANTICOAGULANT (ELIQUIS) 5 MG tablet Take 1 tablet (5 mg) by mouth or Feeding Tube 2 times daily. 3/26/2025 Evening    atorvastatin (LIPITOR) 20 MG tablet Take 1 tablet (20 mg) by mouth or Feeding Tube every evening. 3/26/2025 Evening    calcitRIOL (ROCALTROL) 0.25 MCG capsule Take 1 capsule (0.25 mcg) by mouth three times a week. Given at dialysis Taking    cetirizine (ZYRTEC) 10 MG tablet Take 10 mg by mouth at bedtime. 3/26/2025 Bedtime    Glucagon (BAQSIMI ONE PACK) 3 MG/DOSE POWD Spray 3 mg in nostril See Admin Instructions USE ONLY FOR SEVERE HYPOGLYCEMIA. Taking    hydrALAZINE (APRESOLINE) 25 MG tablet Take 3 tablets (75 mg) by mouth every 8 hours. 3/27/2025 at 12:00 AM    insulin  glargine (LANTUS PEN) 100 UNIT/ML pen Inject 15 Units subcutaneously 2 times daily. 3/26/2025 Evening    ipratropium - albuterol 0.5 mg/2.5 mg/3 mL (DUONEB) 0.5-2.5 (3) MG/3ML neb solution Take 1 vial (3 mLs) by nebulization every 4 hours as needed for wheezing or shortness of breath. Taking As Needed    melatonin 3 MG tablet Take 1 tablet (3 mg) by mouth nightly as needed for sleep. Taking As Needed    miconazole (MICATIN) 2 % external powder Apply topically 2 times daily as needed (redness under breasts/groin) Apply twice daily to skin folds as needed Taking As Needed    midodrine (PROAMATINE) 5 MG tablet Take 1 tablet (5 mg) by mouth three times a week. Mon, Wed, and Fri (Prior to dialysis) 3/26/2025 Morning    olopatadine (PATANOL) 0.1 % ophthalmic solution Place 1 drop into both eyes 2 times daily as needed for allergies. Taking As Needed    ondansetron (ZOFRAN ODT) 4 MG ODT tab Take 4 mg by mouth every 6 hours as needed for nausea or vomiting. Taking As Needed    pantoprazole (PROTONIX) 2 mg/mL SUSP suspension Place 20 mLs (40 mg) into Feeding Tube every morning (before breakfast). 3/26/2025 Morning    sertraline (ZOLOFT) 25 MG tablet TAKE 1  TABLET BY MOUTH EVERY DAY along with a 50 mg tablet for a total of 75 mg (Patient taking differently: Take 75 mg by mouth daily.) 3/26/2025 Morning    sevelamer carbonate (RENVELA) 800 MG tablet Take 800 mg by mouth 3 times daily as needed (with snacks). Taking As Needed    sevelamer carbonate (RENVELA) 800 MG tablet Take two with meals and one with snacks (Patient taking differently: Take 1,600 mg by mouth 3 times daily (with meals).) Past Week    tacrolimus (PROTOPIC) 0.1 % external ointment Apply topically 2 times daily. To skin folds Past Week    triamcinolone (KENALOG) 0.025 % external ointment Apply topically 2 times daily. To rash under breasts and groin as needed Taking    Vitamin D3 50 mcg (2000 units) tablet TAKE ONE TABLET BY MOUTH ONCE DAILY 3/26/2025 Morning

## 2025-03-27 NOTE — PROGRESS NOTES
Hand off given to joseph Pond RN .............................................  March 27, 2025 3:40 PM

## 2025-03-27 NOTE — ED PROVIDER NOTES
"  Emergency Department Note      History of Present Illness     Chief Complaint   Abdominal Pain      HPI   Supriya Herr is a 75 year old female, anticoagulated on Eliquis, with a history of ESRD on dialysis, CHF, respiratory failure with hypoxia, DM2, hypertension, and hyperlipidemia who presents to the ED for abdominal pain. The patient's daughter reports that the patient was recently discharged from the hospital on 3/17. She was admitted for respiratory failure. Since her discharge, she has had some mild shortness of breath, though her daughter notes she presents to the ED today for different symptoms. The patient states that she has abdominal pain, rib pain, and flank pain. She denies any nausea, vomiting, or diarrhea. Her daughter states that she has had 8 days of dry heaving as she has phlegm stuck in her throat. Daughter further reports that the patient was constipated all of last week. Also has had decreased appetite, though she does have a feeding tube in place. Her other symptoms include light sensitivity since discharge from the hospital as well as \"banging in her head\" beginning this morning. Patient is on dialysis and last had this done yesterday. She does still make a small amount of urine.    Independent Historian   Daughter as detailed above.    Review of External Notes   3/9/25-/3/17/25 hospital admission for hypoxemia and respiratory failure.    Past Medical History     Medical History and Problem List   Anemia    Vitiligo  Traumatic amputation of leg above knee  Contact dermatitis and other eczema  Dermatophytosis of nail  Generalized osteoarthrosis  Hypertension  Moderate nonproliferative diabetic retinopathy  Proteinuria  Stage I pressure ulcer  Hyperlipidemia  Non compliance with medical treatment  Basal cell carcinoma  Senile nuclear sclerosis  Obstructive sleep apnea  Type 2 diabetes mellitus with diabetic neuropathy  MDD  Macular cyst, hole, or pseudohole of retina  Former tobacco " use  CKD stage 5  Obesity  Anxiety  Plantar ulcer of right foot with fat layer exposed  Cellulitis  Intertrigo  Drug rash  Wheelchair bound  Combined forms of age-related cataract of right eye  Pseudophakia of left eye  End stage renal disease on dialysis  Steal syndrome as complication of dialysis access  Pneumonia due to COVID-19 virus  Hyperkalemia  Hypervolemia  Acute and chronic respiratory failure with hypoxia  Toxic metabolic encephalopathy  Severe sepsis with acute organ dysfunction  Pleural effusion  Acute on chronic systolic congestive heart failure  Pulmonary vascular congestion  Acute kidney failure    Medications   Pacerone  Norvasc  Eliquis  Lipitor  Rocaltrol  Baqsimi  Apresoline  Novolog pen  Lantus pen  ProAmatine  Pronotix  Zoloft  Renvela  Zyrtec     Surgical History   Past Surgical History:   Procedure Laterality Date    AMPUTATION      left leg AKA    CATARACT IOL, RT/LT Left     CATARACT IOL, RT/LT Right 08/11/2020    + phaco    COLONOSCOPY N/A 6/13/2018    Procedure: COLONOSCOPY;  colonoscopy ;  Surgeon: Barry Morel MD;  Location: [facility]    CREATE FISTULA ARTERIOVENOUS UPPER EXTREMITY Right 11/16/2020    Procedure: CREATION, ARTERIOVENOUS FISTULA, UPPER EXTREMITY WITH INTRAOPERATIVE ULTRASOUND;  Surgeon: Kennedy Banks MD;  Location: [facility]    CREATE GRAFT ARTERIOVENOUS UPPER EXTREMITY BOVINE Left 5/7/2020    Procedure: Left upper arm brachial artery to axillary vein arteriovenous bovine graft creation with intraoperative ultrasound;  Surgeon: Angelita Martin MD;  Location: [facility]    CV RIGHT HEART CATH MEASUREMENTS RECORDED N/A 3/5/2025    Procedure: Right Heart Cath;  Surgeon: Shyam Chapman MD;  Location:  [facility] CATH [facility]    EXCISE EXOSTOSIS FOOT Right 9/26/2018    Procedure: EXCISE EXOSTOSIS FOOT;;  Surgeon: Alvaro Gautam MD;  Location: [facility]    EYE SURGERY  Feb 2012    Repair of hole in left retina    IR DIALYSIS FISTULOGRAM LEFT   7/13/2020    IR DIALYSIS FISTULOGRAM LEFT  9/25/2020    IR DIALYSIS FISTULOGRAM LEFT  10/1/2020    IR DIALYSIS FISTULOGRAM LEFT  4/24/2024    IR DIALYSIS MECH THROMB W/STENT  9/25/2020    IR DIALYSIS PTA  7/13/2020    IR DIALYSIS PTA  10/1/2020    IR GASTROSTOMY TUBE PERCUTANEOUS PLCMNT  2/17/2025    LIGATE FISTULA ARTERIOVENOUS UPPER EXTREMITY Right 2/2/2022    Procedure: Right Upper extremity arteriovenous Fistula Ligation;  Surgeon: Kennedy Banks MD;  [facility]: [facility]    PHACOEMULSIFICATION CLEAR CORNEA WITH STANDARD INTRAOCULAR LENS IMPLANT Right 8/11/2020    Procedure: PHACOEMULSIFICATION, CATARACT, WITH INTRAOCULAR LENS IMPLANT;  Surgeon: Leanne Jett MD;  [facility]    PHACOEMULSIFICATION WITH STANDARD INTRAOCULAR LENS IMPLANT  5/6/13    left    PHACOEMULSIFICATION WITH STANDARD INTRAOCULAR LENS IMPLANT  5/6/2013    Procedure: PHACOEMULSIFICATION WITH STANDARD INTRAOCULAR LENS IMPLANT;  Left Kelman Phacoemulsification with Intraocular Lens Implant;  Surgeon: Mat Valdes MD;  Location: [facility]    RELEASE TRIGGER FINGER  6/27/2014    Procedure: RELEASE TRIGGER FINGER;  Surgeon: Santi Pedraza MD;  Location: [facility]    REMOVE HARDWARE FOOT Right 9/26/2018    Procedure: REMOVE HARDWARE FOOT;  Right Foot Removal Of Hardware, Sesamoidectomy With Second Metatarsal Head Excision ;  Surgeon: Alvaro Gautam MD;  Location: [facility]    REPAIR FISTULA ARTERIOVENOUS UPPER EXTREMITY Right 4/16/2021    Procedure: Banding of right upper arm arteriovenous fistula;  Surgeon: Kennedy Banks MD;  Location: [facility]    RETINAL REATTACHMENT Left     SURGICAL HISTORY OF -   1989    amputation above left knee    SURGICAL HISTORY OF -   1989    right foot, open reduction and pinning    SURGICAL HISTORY OF -   1989    pinning right hip    SURGICAL HISTORY OF -   2006    colon screening declined       Physical Exam     Patient Vitals for the past 24 hrs:   BP Temp Temp  src Pulse Resp SpO2   03/27/25 1057 (!) 163/54 99.1  F (37.3  C) Oral 75 18 98 %     Physical Exam   Eye:  Pupils are equal, round, and reactive.  Extraocular movements intact.    ENT:  No rhinorrhea.  Moist mucus membranes.  Normal tongue and tonsil.    Cardiac:  Regular rate and rhythm.  No murmurs, gallops, or rubs.    Pulmonary:  Clear to auscultation bilaterally.  No wheezes, rales, or rhonchi.    Abdomen:  Positive bowel sounds.  Abdomen is soft and non-distended, without focal tenderness. There is a g-tube in the LUQ without surrounding redness or warmth.    Musculoskeletal:  Normal movement of all extremities without evidence for deficit.    Skin:  Warm and dry without rashes.    Neurologic:  Non-focal exam without asymmetric weakness or numbness.     Psychiatric:  Normal affect with appropriate interaction with examiner.     Diagnostics     Lab Results   Labs Ordered and Resulted from Time of ED Arrival to Time of ED Departure   COMPREHENSIVE METABOLIC PANEL - Abnormal       Result Value    Sodium 136      Potassium 3.6      Carbon Dioxide (CO2) 26      Anion Gap 13      Urea Nitrogen 33.6 (*)     Creatinine 2.67 (*)     GFR Estimate 18 (*)     Calcium 10.9 (*)     Chloride 97 (*)     Glucose 232 (*)     Alkaline Phosphatase 92      AST 16      ALT 22      Protein Total 6.9      Albumin 3.7      Bilirubin Total 0.4     TROPONIN T, HIGH SENSITIVITY - Abnormal    Troponin T, High Sensitivity 85 (*)    NT PROBNP INPATIENT - Abnormal    N terminal Pro BNP Inpatient 51,544 (*)    CBC WITH PLATELETS AND DIFFERENTIAL - Abnormal    WBC Count 17.4 (*)     RBC Count 3.05 (*)     Hemoglobin 9.2 (*)     Hematocrit 29.2 (*)     MCV 96      MCH 30.2      MCHC 31.5      RDW 15.1 (*)     Platelet Count 224      % Neutrophils 83      % Lymphocytes 7      % Monocytes 7      % Eosinophils 1      % Basophils 0      % Immature Granulocytes 3      NRBCs per 100 WBC 0      Absolute Neutrophils 14.4 (*)     Absolute Lymphocytes  1.3      Absolute Monocytes 1.1      Absolute Eosinophils 0.1      Absolute Basophils 0.1      Absolute Immature Granulocytes 0.5 (*)     Absolute NRBCs 0.0     TROPONIN T, HIGH SENSITIVITY - Abnormal    Troponin T, High Sensitivity 85 (*)    PROCALCITONIN - Abnormal    Procalcitonin 1.30 (*)    CRP INFLAMMATION - Abnormal    CRP Inflammation 39.78 (*)    ROUTINE UA WITH MICROSCOPIC REFLEX TO CULTURE   BLOOD CULTURE   BLOOD CULTURE       Imaging   CT Chest Abdomen Pelvis w/o Contrast   Final Result   IMPRESSION:   1.  Small to moderate left and small right pleural effusions with adjacent atelectasis.   2.  New left upper lobe 3 mm nodule may be infectious/inflammatory. Attention on follow-up. Resolved previously visualized right upper lobe pneumonia.   3.  Large rectal stool ball. No evidence of obstruction.   4.  Nonspecific mild urinary bladder wall thickening. Correlate with urinalysis to exclude infection.   5.  Cholelithiasis.              Independent Interpretation   None    ED Course      Medications Administered   Medications   sodium chloride (PF) 0.9% PF flush 3 mL (has no administration in time range)   sodium chloride (PF) 0.9% PF flush 3 mL (has no administration in time range)       Procedures   Procedures     Discussion of Management   Admitting Hospitalist, Dr. Gayle    ED Course   ED Course as of 03/27/25 1432   u Mar 27, 2025   1113 I obtained history and examined the patient as noted above.     1339 I rechecked the patient.   1431 I consulted with Dr. Gayle of the hospitalist service and discussed patient admission. They accepted care of the patient.       Additional Documentation  None    Medical Decision Making / Diagnosis     CMS Diagnoses: None    MIPS       None    MDM   Supriya Herr is a 75 year old female ***    Disposition   The patient was admitted to the hospital.     Diagnosis   No diagnosis found.     Discharge Medications   New Prescriptions    No medications on file          Scribe Disclosure:  I, Janine Lawrence, am serving as a scribe at 11:16 AM on 3/27/2025 to document services personally performed by Trierweiler, Chad A, MD based on my observations and the provider's statements to me.              Scribe Disclosure:  I, Janine Melinda, am serving as a scribe at 11:16 AM on 3/27/2025 to document services personally performed by Trierweiler, Chad A, MD based on my observations and the provider's statements to me.        Trierweiler, Chad A, MD  03/28/25 1005
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
## 2025-03-27 NOTE — PROGRESS NOTES
[facility]  ED Nurse Handoff Report    ED Chief complaint: Abdominal Pain      ED Diagnosis:   Final diagnoses:   Generalized weakness   Leukocytosis, unspecified type   Constipation, unspecified constipation type       Code Status: admitting physician to address     Allergies:   Allergies   Allergen Reactions    Ampicillin-Sulbactam Sodium Rash     No evidence SJS, but very uncomfortable and precipitated multiple provider visits. Would not use penicillins again if other options available.     Penicillins Rash       Patient Story: pt BIBA from SNF with abdominal pan that started this morning. Denies N/V/D. G tube present. On oxygen and renetta lift at baseline. L fistula for dialysis.     Focused Assessment:  pt is ill-appearing however is in good spirits. Very pleasant to interact with. Has no complaints other than abdominal pain.       Treatments and/or interventions provided:   Labs Ordered and Resulted from Time of ED Arrival to Time of ED Departure   COMPREHENSIVE METABOLIC PANEL - Abnormal       Result Value    Sodium 136      Potassium 3.6      Carbon Dioxide (CO2) 26      Anion Gap 13      Urea Nitrogen 33.6 (*)     Creatinine 2.67 (*)     GFR Estimate 18 (*)     Calcium 10.9 (*)     Chloride 97 (*)     Glucose 232 (*)     Alkaline Phosphatase 92      AST 16      ALT 22      Protein Total 6.9      Albumin 3.7      Bilirubin Total 0.4     TROPONIN T, HIGH SENSITIVITY - Abnormal    Troponin T, High Sensitivity 85 (*)    NT PROBNP INPATIENT - Abnormal    N terminal Pro BNP Inpatient 51,544 (*)    CBC WITH PLATELETS AND DIFFERENTIAL - Abnormal    WBC Count 17.4 (*)     RBC Count 3.05 (*)     Hemoglobin 9.2 (*)     Hematocrit 29.2 (*)     MCV 96      MCH 30.2      MCHC 31.5      RDW 15.1 (*)     Platelet Count 224      % Neutrophils 83      % Lymphocytes 7      % Monocytes 7      % Eosinophils 1      % Basophils 0      % Immature Granulocytes 3      NRBCs per 100 WBC 0      Absolute Neutrophils  14.4 (*)     Absolute Lymphocytes 1.3      Absolute Monocytes 1.1      Absolute Eosinophils 0.1      Absolute Basophils 0.1      Absolute Immature Granulocytes 0.5 (*)     Absolute NRBCs 0.0     TROPONIN T, HIGH SENSITIVITY - Abnormal    Troponin T, High Sensitivity 85 (*)    PROCALCITONIN - Abnormal    Procalcitonin 1.30 (*)    CRP INFLAMMATION - Abnormal    CRP Inflammation 39.78 (*)    ROUTINE UA WITH MICROSCOPIC REFLEX TO CULTURE   INFLUENZA A/B, RSV AND SARS-COV2 PCR   BLOOD CULTURE   BLOOD CULTURE     CT Chest Abdomen Pelvis w/o Contrast   Final Result   IMPRESSION:   1.  Small to moderate left and small right pleural effusions with adjacent atelectasis.   2.  New left upper lobe 3 mm nodule may be infectious/inflammatory. Attention on follow-up. Resolved previously visualized right upper lobe pneumonia.   3.  Large rectal stool ball. No evidence of obstruction.   4.  Nonspecific mild urinary bladder wall thickening. Correlate with urinalysis to exclude infection.   5.  Cholelithiasis.              Medications   sodium chloride (PF) 0.9% PF flush 3 mL (has no administration in time range)   sodium chloride (PF) 0.9% PF flush 3 mL (has no administration in time range)       Patient's response to treatments and/or interventions: tolerated     To be done/followed up on inpatient unit:  peripheral blood cultures needed     Does this patient have any cognitive concerns?: Short term memory loss    Activity level - Baseline/Home:  Total Care  Activity Level - Current:   Total Care    Patient's Preferred language: English   Needed?: No    Isolation: None  Infection: Not Applicable  Patient tested for COVID 19 prior to admission: NO  Bariatric?: Yes, L BKA.     Vital Signs:   Vitals:    03/27/25 1057 03/27/25 1447   BP: (!) 163/54 (!) 154/55   Pulse: 75 75   Resp: 18    Temp: 99.1  F (37.3  C)    TempSrc: Oral    SpO2: 98% 99%       Cardiac Rhythm:     Was the PSS-3 completed:   Yes  Family Comments:  daughter present and supportive at bedside   OBS brochure/video discussed/provided to patient/family: No  For the majority of the shift this patient's behavior was Green.   Behavioral interventions performed were none, frequent rouding .    ED NURSE PHONE NUMBER: [phone]

## 2025-03-27 NOTE — ED TRIAGE NOTES
BIBA from [facility] for abd pain that started this morning, denies n/v/d. Pt has a G tube and is a renetta transfer.      Triage Assessment (Adult)       Row Name 03/27/25 0956          Triage Assessment    Airway WDL WDL        Respiratory WDL    Respiratory WDL X  baseline 2L NC        Skin Circulation/Temperature WDL    Skin Circulation/Temperature WDL WDL        Cardiac WDL    Cardiac WDL WDL  denies cp        Peripheral/Neurovascular WDL    Peripheral Neurovascular WDL WDL        Cognitive/Neuro/Behavioral WDL    Cognitive/Neuro/Behavioral WDL WDL

## 2025-03-28 VITALS
SYSTOLIC BLOOD PRESSURE: 184 MMHG | DIASTOLIC BLOOD PRESSURE: 54 MMHG | HEIGHT: 66 IN | TEMPERATURE: 99.4 F | HEART RATE: 79 BPM | OXYGEN SATURATION: 98 % | RESPIRATION RATE: 18 BRPM | BODY MASS INDEX: 31.85 KG/M2 | WEIGHT: 198.19 LBS

## 2025-03-28 LAB
ALBUMIN SERPL BCG-MCNC: 3.4 G/DL (ref 3.5–5.2)
ALP SERPL-CCNC: 93 U/L (ref 40–150)
ALT SERPL W P-5'-P-CCNC: 16 U/L (ref 0–50)
ANION GAP SERPL CALCULATED.3IONS-SCNC: 11 MMOL/L (ref 7–15)
AST SERPL W P-5'-P-CCNC: 11 U/L (ref 0–45)
BILIRUB SERPL-MCNC: 0.4 MG/DL
BUN SERPL-MCNC: 45.9 MG/DL (ref 8–23)
CALCIUM SERPL-MCNC: 10.8 MG/DL (ref 8.8–10.4)
CHLORIDE SERPL-SCNC: 97 MMOL/L (ref 98–107)
CREAT SERPL-MCNC: 3.6 MG/DL (ref 0.51–0.95)
CRP SERPL-MCNC: 48.35 MG/L
E FAECALIS DNA BLD POS QL NAA+NON-PROBE: DETECTED
E FAECIUM DNA BLD POS QL NAA+NON-PROBE: NOT DETECTED
EGFRCR SERPLBLD CKD-EPI 2021: 12 ML/MIN/1.73M2
ERYTHROCYTE [DISTWIDTH] IN BLOOD BY AUTOMATED COUNT: 15.4 % (ref 10–15)
GLUCOSE BLDC GLUCOMTR-MCNC: 118 MG/DL (ref 70–99)
GLUCOSE BLDC GLUCOMTR-MCNC: 122 MG/DL (ref 70–99)
GLUCOSE BLDC GLUCOMTR-MCNC: 136 MG/DL (ref 70–99)
GLUCOSE BLDC GLUCOMTR-MCNC: 179 MG/DL (ref 70–99)
GLUCOSE BLDC GLUCOMTR-MCNC: 92 MG/DL (ref 70–99)
GLUCOSE SERPL-MCNC: 140 MG/DL (ref 70–99)
GP B STREP DNA BLD POS QL NAA+NON-PROBE: NOT DETECTED
HCO3 SERPL-SCNC: 26 MMOL/L (ref 22–29)
HCT VFR BLD AUTO: 25.4 % (ref 35–47)
HGB BLD-MCNC: 8.1 G/DL (ref 11.7–15.7)
LACTATE SERPL-SCNC: 0.5 MMOL/L (ref 0.7–2)
LISTERIA SPECIES (DETECTED/NOT DETECTED): NOT DETECTED
MCH RBC QN AUTO: 30.3 PG (ref 26.5–33)
MCHC RBC AUTO-ENTMCNC: 31.9 G/DL (ref 31.5–36.5)
MCV RBC AUTO: 95 FL (ref 78–100)
MRSA DNA SPEC QL NAA+PROBE: NEGATIVE
PLATELET # BLD AUTO: 197 10E3/UL (ref 150–450)
POTASSIUM SERPL-SCNC: 3.9 MMOL/L (ref 3.4–5.3)
PROCALCITONIN SERPL IA-MCNC: 1.36 NG/ML
PROT SERPL-MCNC: 6.2 G/DL (ref 6.4–8.3)
RBC # BLD AUTO: 2.67 10E6/UL (ref 3.8–5.2)
S AUREUS DNA BLD POS QL NAA+NON-PROBE: NOT DETECTED
S AUREUS+CONS DNA BLD POS NAA+NON-PROBE: NOT DETECTED
S EPIDERMIDIS DNA BLD POS QL NAA+NON-PRB: NOT DETECTED
S LUGDUNENSIS DNA BLD POS QL NAA+NON-PRB: NOT DETECTED
S PNEUM DNA BLD POS QL NAA+NON-PROBE: NOT DETECTED
S PYO DNA BLD POS QL NAA+NON-PROBE: NOT DETECTED
SA TARGET DNA: NEGATIVE
SODIUM SERPL-SCNC: 134 MMOL/L (ref 135–145)
STREPTOCOCCUS ANGINOSUS GROUP: NOT DETECTED
STREPTOCOCCUS DNA BLD POS NAA+NON-PROBE: NOT DETECTED
WBC # BLD AUTO: 21.3 10E3/UL (ref 4–11)

## 2025-03-28 PROCEDURE — 250N000013 HC RX MED GY IP 250 OP 250 PS 637: Performed by: STUDENT IN AN ORGANIZED HEALTH CARE EDUCATION/TRAINING PROGRAM

## 2025-03-28 PROCEDURE — 258N000003 HC RX IP 258 OP 636: Performed by: STUDENT IN AN ORGANIZED HEALTH CARE EDUCATION/TRAINING PROGRAM

## 2025-03-28 PROCEDURE — 99232 SBSQ HOSP IP/OBS MODERATE 35: CPT | Performed by: INTERNAL MEDICINE

## 2025-03-28 PROCEDURE — 5A1D70Z PERFORMANCE OF URINARY FILTRATION, INTERMITTENT, LESS THAN 6 HOURS PER DAY: ICD-10-PCS | Performed by: STUDENT IN AN ORGANIZED HEALTH CARE EDUCATION/TRAINING PROGRAM

## 2025-03-28 PROCEDURE — 85041 AUTOMATED RBC COUNT: CPT | Performed by: STUDENT IN AN ORGANIZED HEALTH CARE EDUCATION/TRAINING PROGRAM

## 2025-03-28 PROCEDURE — 250N000013 HC RX MED GY IP 250 OP 250 PS 637: Performed by: HOSPITALIST

## 2025-03-28 PROCEDURE — G0378 HOSPITAL OBSERVATION PER HR: HCPCS

## 2025-03-28 PROCEDURE — 36415 COLL VENOUS BLD VENIPUNCTURE: CPT | Performed by: STUDENT IN AN ORGANIZED HEALTH CARE EDUCATION/TRAINING PROGRAM

## 2025-03-28 PROCEDURE — 999N000226 HC STATISTIC SLP IP EVAL DEFER

## 2025-03-28 PROCEDURE — 99222 1ST HOSP IP/OBS MODERATE 55: CPT | Performed by: PHYSICIAN ASSISTANT

## 2025-03-28 PROCEDURE — 634N000001 HC RX 634: Mod: JZ | Performed by: INTERNAL MEDICINE

## 2025-03-28 PROCEDURE — 250N000011 HC RX IP 250 OP 636: Mod: JZ | Performed by: HOSPITALIST

## 2025-03-28 PROCEDURE — 90937 HEMODIALYSIS REPEATED EVAL: CPT

## 2025-03-28 PROCEDURE — G0463 HOSPITAL OUTPT CLINIC VISIT: HCPCS

## 2025-03-28 PROCEDURE — 82962 GLUCOSE BLOOD TEST: CPT

## 2025-03-28 PROCEDURE — 999N000157 HC STATISTIC RCP TIME EA 10 MIN

## 2025-03-28 PROCEDURE — 87641 MR-STAPH DNA AMP PROBE: CPT | Performed by: HOSPITALIST

## 2025-03-28 PROCEDURE — 99232 SBSQ HOSP IP/OBS MODERATE 35: CPT | Performed by: STUDENT IN AN ORGANIZED HEALTH CARE EDUCATION/TRAINING PROGRAM

## 2025-03-28 PROCEDURE — 96375 TX/PRO/DX INJ NEW DRUG ADDON: CPT

## 2025-03-28 PROCEDURE — 84145 PROCALCITONIN (PCT): CPT | Performed by: STUDENT IN AN ORGANIZED HEALTH CARE EDUCATION/TRAINING PROGRAM

## 2025-03-28 PROCEDURE — 83605 ASSAY OF LACTIC ACID: CPT | Performed by: STUDENT IN AN ORGANIZED HEALTH CARE EDUCATION/TRAINING PROGRAM

## 2025-03-28 PROCEDURE — 250N000011 HC RX IP 250 OP 636: Performed by: STUDENT IN AN ORGANIZED HEALTH CARE EDUCATION/TRAINING PROGRAM

## 2025-03-28 PROCEDURE — 86140 C-REACTIVE PROTEIN: CPT | Performed by: STUDENT IN AN ORGANIZED HEALTH CARE EDUCATION/TRAINING PROGRAM

## 2025-03-28 PROCEDURE — 87040 BLOOD CULTURE FOR BACTERIA: CPT | Performed by: PHYSICIAN ASSISTANT

## 2025-03-28 PROCEDURE — 96374 THER/PROPH/DIAG INJ IV PUSH: CPT

## 2025-03-28 PROCEDURE — 94660 CPAP INITIATION&MGMT: CPT

## 2025-03-28 PROCEDURE — 80053 COMPREHEN METABOLIC PANEL: CPT | Performed by: STUDENT IN AN ORGANIZED HEALTH CARE EDUCATION/TRAINING PROGRAM

## 2025-03-28 PROCEDURE — 36415 COLL VENOUS BLD VENIPUNCTURE: CPT | Performed by: PHYSICIAN ASSISTANT

## 2025-03-28 PROCEDURE — 120N000001 HC R&B MED SURG/OB

## 2025-03-28 PROCEDURE — 258N000003 HC RX IP 258 OP 636: Performed by: INTERNAL MEDICINE

## 2025-03-28 RX ORDER — MEROPENEM 500 MG/1
500 INJECTION, POWDER, FOR SOLUTION INTRAVENOUS DAILY
Status: DISCONTINUED | OUTPATIENT
Start: 2025-03-28 | End: 2025-03-28 | Stop reason: ALTCHOICE

## 2025-03-28 RX ORDER — HEPARIN SODIUM 1000 [USP'U]/ML
500 INJECTION, SOLUTION INTRAVENOUS; SUBCUTANEOUS CONTINUOUS
Status: DISCONTINUED | OUTPATIENT
Start: 2025-03-28 | End: 2025-03-28

## 2025-03-28 RX ORDER — DEXTROSE MONOHYDRATE 100 MG/ML
INJECTION, SOLUTION INTRAVENOUS CONTINUOUS PRN
Status: DISCONTINUED | OUTPATIENT
Start: 2025-03-28 | End: 2025-04-17 | Stop reason: HOSPADM

## 2025-03-28 RX ORDER — HYDROMORPHONE HCL IN WATER/PF 6 MG/30 ML
0.2 PATIENT CONTROLLED ANALGESIA SYRINGE INTRAVENOUS
Status: COMPLETED | OUTPATIENT
Start: 2025-03-28 | End: 2025-03-28

## 2025-03-28 RX ORDER — AMINO ACIDS/PROTEIN HYDROLYS 11G-40/45
1 LIQUID IN PACKET (ML) ORAL DAILY
Status: DISCONTINUED | OUTPATIENT
Start: 2025-03-28 | End: 2025-04-13

## 2025-03-28 RX ORDER — LABETALOL HYDROCHLORIDE 5 MG/ML
10 INJECTION, SOLUTION INTRAVENOUS
Status: DISCONTINUED | OUTPATIENT
Start: 2025-03-28 | End: 2025-04-17 | Stop reason: HOSPADM

## 2025-03-28 RX ORDER — CLONIDINE HYDROCHLORIDE 0.1 MG/1
0.1 TABLET ORAL EVERY 4 HOURS PRN
Status: DISCONTINUED | OUTPATIENT
Start: 2025-03-28 | End: 2025-04-17 | Stop reason: HOSPADM

## 2025-03-28 RX ADMIN — HYDRALAZINE HYDROCHLORIDE 75 MG: 25 TABLET ORAL at 06:03

## 2025-03-28 RX ADMIN — SENNOSIDES AND DOCUSATE SODIUM 2 TABLET: 50; 8.6 TABLET ORAL at 01:36

## 2025-03-28 RX ADMIN — CALCITRIOL CAPSULES 0.25 MCG 0.25 MCG: 0.25 CAPSULE ORAL at 18:26

## 2025-03-28 RX ADMIN — CETIRIZINE HYDROCHLORIDE 10 MG: 10 TABLET, FILM COATED ORAL at 22:59

## 2025-03-28 RX ADMIN — Medication 60 ML: at 18:46

## 2025-03-28 RX ADMIN — EPOETIN ALFA-EPBX 10000 UNITS: 10000 INJECTION, SOLUTION INTRAVENOUS; SUBCUTANEOUS at 16:15

## 2025-03-28 RX ADMIN — SODIUM CHLORIDE 500 ML: 0.9 INJECTION, SOLUTION INTRAVENOUS at 16:14

## 2025-03-28 RX ADMIN — SODIUM CHLORIDE 250 ML: 0.9 INJECTION, SOLUTION INTRAVENOUS at 16:14

## 2025-03-28 RX ADMIN — APIXABAN 5 MG: 5 TABLET, FILM COATED ORAL at 18:26

## 2025-03-28 RX ADMIN — APIXABAN 5 MG: 5 TABLET, FILM COATED ORAL at 22:59

## 2025-03-28 RX ADMIN — LABETALOL HYDROCHLORIDE 10 MG: 5 INJECTION INTRAVENOUS at 01:35

## 2025-03-28 RX ADMIN — HYDROMORPHONE HYDROCHLORIDE 0.2 MG: 0.2 INJECTION, SOLUTION INTRAMUSCULAR; INTRAVENOUS; SUBCUTANEOUS at 01:35

## 2025-03-28 RX ADMIN — VANCOMYCIN HYDROCHLORIDE 1750 MG: 10 INJECTION, POWDER, LYOPHILIZED, FOR SOLUTION INTRAVENOUS at 20:26

## 2025-03-28 RX ADMIN — SERTRALINE HYDROCHLORIDE 75 MG: 50 TABLET ORAL at 18:26

## 2025-03-28 RX ADMIN — AMIODARONE HYDROCHLORIDE 200 MG: 200 TABLET ORAL at 18:26

## 2025-03-28 RX ADMIN — HYDRALAZINE HYDROCHLORIDE 75 MG: 25 TABLET ORAL at 22:58

## 2025-03-28 RX ADMIN — MIDODRINE HYDROCHLORIDE 5 MG: 5 TABLET ORAL at 18:26

## 2025-03-28 RX ADMIN — ONDANSETRON 4 MG: 2 INJECTION, SOLUTION INTRAMUSCULAR; INTRAVENOUS at 01:35

## 2025-03-28 RX ADMIN — SODIUM CHLORIDE 200 ML: 0.9 INJECTION, SOLUTION INTRAVENOUS at 16:14

## 2025-03-28 RX ADMIN — MEROPENEM 500 MG: 500 INJECTION, POWDER, FOR SOLUTION INTRAVENOUS at 06:04

## 2025-03-28 RX ADMIN — Medication 40 MG: at 06:30

## 2025-03-28 RX ADMIN — ATORVASTATIN CALCIUM 20 MG: 20 TABLET, FILM COATED ORAL at 22:58

## 2025-03-28 ASSESSMENT — ACTIVITIES OF DAILY LIVING (ADL)
ADLS_ACUITY_SCORE: 75
ADLS_ACUITY_SCORE: 73
ADLS_ACUITY_SCORE: 73
ADLS_ACUITY_SCORE: 75
ADLS_ACUITY_SCORE: 75
DEPENDENT_IADLS:: CLEANING;COOKING;LAUNDRY;SHOPPING;MEDICATION MANAGEMENT;TRANSPORTATION
ADLS_ACUITY_SCORE: 75
ADLS_ACUITY_SCORE: 75
ADLS_ACUITY_SCORE: 73
ADLS_ACUITY_SCORE: 75
ADLS_ACUITY_SCORE: 75
ADLS_ACUITY_SCORE: 73
ADLS_ACUITY_SCORE: 75
ADLS_ACUITY_SCORE: 73
ADLS_ACUITY_SCORE: 75
ADLS_ACUITY_SCORE: 75
ADLS_ACUITY_SCORE: 73
ADLS_ACUITY_SCORE: 75

## 2025-03-28 NOTE — PLAN OF CARE
Summary:   abdominal pain and retching.  Orientation: A/Ox3 dis to time int conf  Observation Goals (met & not met): Not met  Activity Level: Ax2 Lift LBKA  Fall Risk: Yes  Behavior & Aggression Tool Color: Green  Pain Management: PRN IV/PO diluadid given   ABNL VS/O2: VSS on 2L NC/Oxymask/Cpap ex HTN PRN labetalol given/ Temp max 99.9  ABNL Lab/BG: See flowsheets   Diet: Reg   Bowel/Bladder: Incont Bowel 2 loose Bm's this shift,  not producing urine on HD  Drains/Devices: RPIV SL, PEG tube   Tests/Procedures for next shift: HD  Anticipated DC date: Pending   Other Important Info: PRN zofran,senna given   Meds crushed and given through Peg tube   Tele NSR  BC- Gram positive cocci in pairs and chains Started on Abx  Sacral wound mepi in place     Comments: -diagnostic tests and consults completed and resulted -not met   -vital signs normal or at patient baseline -not met  -tolerating oral intake to maintain hydration- not met  -adequate pain control on oral analgesics- not met  -infection is improving -not met  -returns to baseline functional status - not met   -safe disposition plan has been identified - not met  Nurse to notify provider when observation goals have been met and patient is ready for discharge.

## 2025-03-28 NOTE — PLAN OF CARE
"SLP: well known to SLP services, multiple evaluations during prior recent admissions. Most recently:    3/2025 admission: Clinical SLP assessment/treatmtnet, rec'ing regular/thin with strategies. Last SLP stating \"Overall pt had limited po intake despite swallow found to be WFL resulted in PEG placement during recent hospital stay.  Anxiety appears to be an ongoing issue with pt feeling that she can not swallow.  No further SLP swallow eval and Tx indicated.\"    3/2/25 Esophagram:  ESOPHAGUS: Normal. Normal peristalsis. No strictures, masses, or inflammatory changes. No gastroesophageal reflux in the recumbent position. IMPRESSION: 1.  Normal esophagram.     1/31/25 VFSS: IMPRESSION: No penetration or aspiration with any consistency - Pt presents with minimal-no oral-pharyngeal dysphagia based on lack of lower dentition.  Oral-pharyngeal function is WFL; however, pt continues to complain about intermittent feeling of food sticking/globus sensation resulting in poor po intake.  VFSS was completed during pt's previous hospital stay also with no significant pharyngeal residue and no penetration/aspration.  Esophageal dysphagia symptoms were reported at that time.  From an oral-pharyngeal perspective ok for regular diet textures, picking soft food from diet, and thin liquids with reminders to use GERD precautions.         Discussed case with pt at bedside. She reports retching and vomiting, but no changes in swallow when actually eating/drinking. Pt declines evaluation currently and overall repeat clinical swallow evaluation does not appear needed to provide any new or skilled services. Defer to GI re: retching and abdominal pain work up and management. Continue to follow most recent SLP recommendations per prior assessments:     Regular diet (pick soft food as needed, given dentition) and continue thin liquids (IDDSI 0) given swallow strategies (fully upright position, small sips/bites, slow pace with rest breaks as " needed) and reflux precautions (remain upright 60 minutes after oral intake, elevate HOB 30 degrees, do not lay down 2-3 hours after meals, smaller meals at a sitting). Crush pills if possible or trial small pills one at a time wtih puree followed by sips of water.      Re-consult SLP services if changes/concerns arise.     Updated hospitalist as well.

## 2025-03-28 NOTE — PROVIDER NOTIFICATION
MD Notification    Notified Person: MD    Notified Person Name: Redd Guerrero    Notification Date/Time:  3/28/25 02:23     Notification Interaction: Vocibis    Purpose of Notification: Sepsis protocol fired     Orders Received:    Comments:
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
## 2025-03-28 NOTE — PROVIDER NOTIFICATION
MD Notification    Notified Person: MD    Notified Person Name: Dr. Lawson    Notification Date/Time: 3/28/2025 0748    Notification Interaction: [employer] messaging    Purpose of Notification: Blood culture positive for Enterococcus faecalis. Patient is on merrem daily.    Orders Received: provider acknowledged.     Comments:

## 2025-03-28 NOTE — PROGRESS NOTES
"[facility]    Medicine Progress Note - Hospitalist Service    Date of Admission:  3/27/2025    Assessment & Plan      Supriya Herr is a 75 year old female with very complex past medical history including end-stage renal disease on hemodialysis, HFpEF, paroxysmal atrial fibrillation, hypertension, hyperlipidemia, anxiety, depression, chronic anemia, dysphagia status post PEG tube, type 2 diabetes, left traumatic AKA and recent hospitalization with multifactorial respiratory failure now being admitted on 3/27/2025 with abdominal pain and retching. She is found to have e/o constipation. But other evaluation notable for elevated WBC, procal and CRP of unclear etiology.     Pf note, pt with multiple recent hospitalizations. Per last discharge summary:   \"Hospitalized at [facility] from 3/2 - 3/6/2025 with shortness of breath likely multifactorial.  Volume status was difficult to determine, underwent right heart cath on 3/5 showed mildly elevated right atrial and PCWP readings.  Was treated for acute respiratory failure likely from heart failure exacerbation, atrial fibrillation, end-stage renal disease and discharged to care facility with supplemental nocturnal oxygen.\"  \"Prolonged hospitalized at [facility] from 1/8 to 2/26/2025 for acute hypoxic respiratory failure multifactorial, was intubated ( 1/9-1/18), reintubated (1/20 - 1/25).  Then was treated for shock with pressors, influenza A pneumonia, hospital-acquired pneumonia with intravenous antibiotics, steroids, antivirals.  Then also noted to have heart failure exacerbation, recurrent A-fib with RVR, subsequent bradycardia.  Then also noted to have encephalopathy likely from infectious, metabolic etiology and delirium.  During that hospitalization was noted to have dysphagia, pharyngeal edema, was evaluated by ENT on 2/16.  No findings to explain dysphagia.  G-tube was placed by IR on 2/17 and was on nocturnal tube feeds.  Discharged to TCU for " "rehabilitation.\"    Abdominal pain  Retching, with Hx of GERD   Dysphagia s/p PEG tube (2/1/2025)   Constipation, rectal stool ball   Pt presents to the ED with vague complaints of retching/dry heaving without vomiting and some crampy lower abdominal pain.   * CT scan showed large rectal stool ball without evidence of obstruction. There was also some non-specific mild urinary bladder wall thickening. No other intra-abdominal pathology.   - Unclear exactly what is going on. Pt did not have any retching or other symptoms while I was in the room. Perhaps she has some reflux of tube feeds or other esophageal issue. On my discussion with her 3/28, she did not have any concerns about this but clearly was an issue when she came in.  - Will order bowel regimen to work on the constipation   - GI following   - NPO at midnight   - XR esophagram  - Zofran available PRN    Leukocytosis  Elevated procalcitonin   Elevated CRP  E. Faecalis bacteremia  WBC elevated to 17.4, Procal elevated to 1.3, and CRP elevated to 39.78.   Unclear infectious source. She has some mild non-specific urinary bladder wall thickening, but with ESRD makes very little urine. Also has a new MIKAYLA 3mm nodule which may be infectious or inflammatory, but no respiratory complaints.   * COVID, flu RSV pending   * UA very abnormal but difficult to interpret in the setting of ESRD   * Bcx positive for E. Faecalis. Unclear source.  - ID consulted for assistance   - TTE   - Switched from Meropenem to Vancomycin   - Repeat Bcx until cleared  - Follow-up blood cultures  - Trend WBC, procal, and CRP     End-stage renal disease on HD   Dialyzes MWF via L arm fistula. Per prior notes, volume removal has often been limited by intra dialytic hypotension and a fib with RVR   - Nephrology consulted   - Continue PTA midodrine MWF before dialysis   - Continue PTA calcitriol, renvela, vit D.     Heart failure with preserved ejection fraction  Markedly elevated BNP without " clinical features of exacerbation   Paroxysmal atrial fibrillation on chronic anticoagulation  Hypertension  Hyperlipidemia  Troponin elevation, chronic (85->85)  [PTA medications include: amiodarone 200mg BID, amlodipine 10mg daily, Apixaban 5mg BID, atorvastatin 20mg daily, hydralazine 75mg Q8H,   *TTE 1/21/2025 with LVEF of 60%.  No valvular abnormality   * RHC 3/5/25 showing moderately elevated right and left-sided filling pressure; elevated wedge pressure consistent with pulmonary hypertension  * On admission now, pt has markedly elevated BNP to 51,544 (though historically is in the 20,000-40,000 range), She does have small pleural effusions on CT, but no other overt evidence of volume overload or clinical HF.   - I/Os and daily weights ordered  - continue PTA apixaban 5 mg BID  - continue PTA atorvastatin 20 mg daily  - continue PTA hydralazine 75 mg q8H  - continue PTA amiodarone 200 mg BID  - continue PTA amlodipine 10 m qday  - Volume management per nephrology    Anxiety/depression/insomnia  - Continue PTA zoloft     Anemia of chronic disease  Hemoglobin 9.2 on admission, stable   - Monitor CBC       Type II DM  - Continue PTA lantus 15 units BID   - MDSSI     Physical deconditioning from medical illness, senile frailty.  S/p left above-knee amputation, traumatic  Patient from TCU, daughter hopeful pt will be able to discharge back home with services rather than going back to TCU.   - PT/OT consulted      Sacrococcygeal Pressure injury, Stage 3, present on admission  Wound care team followed.  Pressure injury prevention.      Obesity with a BMI of 34.  Increase all-cause mortality and morbidity.          Diet: Renal Diet (non-dialysis)  Adult Formula Drip Feeding: [place] Renal Support; Gastrostomy; Goal Rate: 65; mL/hr; From: 4:00 PM; To: 6:00 AM; Resume nocturnal TF tonight at 4:00 pm -- Run at 65 mL/hr daily from 4:00 pm to 6:00 am    DVT Prophylaxis: DOAC  Bradshaw Catheter: Not  "present  Lines: None     Cardiac Monitoring: None  Code Status: No CPR- Pre-arrest intubation OK      Clinically Significant Risk Factors Present on Admission         # Hyponatremia: Lowest Na = 134 mmol/L in last 2 days, will monitor as appropriate  # Hypochloremia: Lowest Cl = 97 mmol/L in last 2 days, will monitor as appropriate   # Hypercalcemia: Highest Ca = 10.9 mg/dL in last 2 days, will monitor as appropriate    # Hypoalbuminemia: Lowest albumin = 3.4 g/dL at 3/28/2025  3:20 AM, will monitor as appropriate    # Drug Induced Coagulation Defect: home medication list includes an anticoagulant medication    # Hypertension: Noted on problem list  # Chronic heart failure with preserved ejection fraction: heart failure noted on problem list and last echo with EF >50%     # Anemia: based on hgb <11      # DMII: A1C = 6.5 % (Ref range: <5.7 %) within past 6 months   # Obesity: Estimated body mass index is 31.99 kg/m  as calculated from the following:    Height as of this encounter: 1.676 m (5' 6\").    Weight as of this encounter: 89.9 kg (198 lb 3.1 oz).         # Financial/Environmental Concerns:           Social Drivers of Health    Tobacco Use: Medium Risk (3/25/2025)    Patient History     Smoking Tobacco Use: Former     Smokeless Tobacco Use: Never   Physical Activity: Insufficiently Active (7/19/2024)    Exercise Vital Sign     Days of Exercise per Week: 1 day     Minutes of Exercise per Session: 10 min   Social Connections: Unknown (7/19/2024)    Social Connection and Isolation Panel [NHANES]     Frequency of Social Gatherings with Friends and Family: More than three times a week          Disposition Plan     Medically Ready for Discharge: Anticipated in 2-4 Days pending evaluation of bacteremia             Ganesh Lawson MD  Hospitalist Service  [facility]  Securely message with [employer] (more info)  Text page via [employer] Paging/Directory "   ______________________________________________________________________    Interval History   Admitted to the hospital yesterday. In the interim, found to have E. Faecalis bacteremia of unclear source and was started on meropenem overnight. Today, patient states her only concern coming in was coughing up more sputum which is different from what has been stated in previous notes. Seems that has been some variation in history given for unclear reason. At this time, patient feels improved from when she came in. We discussed the E. Faecalis bacteremia and need for further treatment/evaluation which she understood.    Physical Exam   Vital Signs: Temp: 99.5  F (37.5  C) Temp src: Oral BP: 132/41 Pulse: 69   Resp: 18 SpO2: 97 % O2 Device: Nasal cannula Oxygen Delivery: 2 LPM  Weight: 198 lbs 3.1 oz  Constitutional: Awake, alert, cooperative, no apparent distress.    ENT: Normocephalic, without obvious abnormality, atraumatic.  Extra occular movements intact.  Normal sclera.  Pulmonary: No increased work of breathing, good air exchange, clear to auscultation bilaterally, no crackles or wheezing.  Cardiovascular: Regular rate and rhythm, normal S1 and S2, no S3 or S4. No murmurs, rubs, or gallops noted.  GI: Normal bowel sounds, soft, non-distended, mild tenderness of abdomen generally.  PEG tube in place. No guarding or rebound.  Skin/Integumen: No cyanosis or jaundice. No rashes noted.  Extremities: No lower extremity edema.  Neuro: A&Ox4. Conversant. Responds appropriately in conversation. Moves all extremities with no focal deficit identified.      Medical Decision Making     43 MINUTES SPENT BY ME on the date of service doing chart review, history, exam, documentation & further activities per the note.      Data   ------------------------- PAST 24 HR DATA REVIEWED -----------------------------------------------    I have personally reviewed the following data over the past 24 hrs:    21.3 (H)  \   8.1 (L)   / 197      134 (L) 97 (L) 45.9 (H) /  122 (H)   3.9 26 3.60 (H) \     ALT: 16 AST: 11 AP: 93 TBILI: 0.4   ALB: 3.4 (L) TOT PROTEIN: 6.2 (L) LIPASE: N/A     Procal: 1.36 (H) CRP: 48.35 (H) Lactic Acid: 0.5 (L)         Imaging results reviewed over the past 24 hrs:   No results found for this or any previous visit (from the past 24 hours).

## 2025-03-28 NOTE — PROGRESS NOTES
Called by nursing staff with report of positive blood culture, empiric antibiotics ordered, MRSA PCR.

## 2025-03-28 NOTE — PLAN OF CARE
Goal Outcome Evaluation:    Comments:     -diagnostic tests and consults completed and resulted -not met   -vital signs normal or at patient baseline -met  -tolerating oral intake to maintain hydration- not met  -adequate pain control on oral analgesics- met  -infection is improving -not met  -returns to baseline functional status - not met   -safe disposition plan has been identified - not met  Nurse to notify provider when observation goals have been met and patient is ready for discharge

## 2025-03-28 NOTE — CONSULTS
"[facility] Nurse Inpatient Assessment     Consulted for: \"buttock\"    Summary: POA open wound to left buttock & intertrigo/IAD to gluteal cleft    [facility] nurse follow-up plan: weekly    Patient History (according to provider note(s):      \"Supriya Herr is a 75 year old female with very complex past medical history including end-stage renal disease on hemodialysis, HFpEF, paroxysmal atrial fibrillation, hypertension, hyperlipidemia, anxiety, depression, chronic anemia, dysphagia status post PEG tube, type 2 diabetes, left traumatic AKA and recent hospitalization with multifactorial respiratory failure now being admitted on 3/27/2025 with abdominal pain and retching. She is found to have e/o constipation. But other evaluation notable for elevated WBC, procal and CRP of unclear etiology.     Pf note, pt with multiple recent hospitalizations. Per last discharge summary:   \"Hospitalized at [facility] from 3/2 - 3/6/2025 with shortness of breath likely multifactorial.  Volume status was difficult to determine, underwent right heart cath on 3/5 showed mildly elevated right atrial and PCWP readings.  Was treated for acute respiratory failure likely from heart failure exacerbation, atrial fibrillation, end-stage renal disease and discharged to care facility with supplemental nocturnal oxygen.\"  \"Prolonged hospitalized at [facility] from 1/8 to 2/26/2025 for acute hypoxic respiratory failure multifactorial, was intubated ( 1/9-1/18), reintubated (1/20 - 1/25).  Then was treated for shock with pressors, influenza A pneumonia, hospital-acquired pneumonia with intravenous antibiotics, steroids, antivirals.  Then also noted to have heart failure exacerbation, recurrent A-fib with RVR, subsequent bradycardia.  Then also noted to have encephalopathy likely from infectious, metabolic etiology and delirium.  During that hospitalization was noted to have dysphagia, pharyngeal edema, was evaluated by ENT on 2/16.  No " "findings to explain dysphagia.  G-tube was placed by IR on 2/17 and was on nocturnal tube feeds.  Discharged to TCU for rehabilitation.\"    Assessment:      Areas visualized during today's visit: Focused:    Wound location:     Last photo: 3/28  Wound due to: Friction, Incontinence Associated Dermatitis (IAD), and Intertrigo  Wound history/plan of care: Multiple pink scars noted to buttocks/sacrococcygeal area. Cluster of open wounds to left buttocks, etiology unknown but suspect this is related to incontinence, friction, and there may be a pressure component. Small area of intertrigo/IAD to gluteal cleft.    Wound base: Left buttock - Epidermis, Serous scabbing, Moist, Pink, Bumpy , and Yellow, Gluteal cleft Moist, Pink, Smooth , and Pale     Palpation of the wound bed: normal      Drainage: small     Description of drainage: serous     Measurements (length x width x depth, in cm): Left buttock is ~ 4 x 2.5 x 0.1 cm, Gluteal cleft is 0.5 x 0.1 x 0.2 cm     Tunneling: N/A     Undermining: N/A  Periwound skin: Intact and Scar tissue      Color: normal and consistent with surrounding tissue, pale, and pink      Temperature: normal   Odor: none  Pain: mild and during dressing change  Pain interventions prior to dressing change: patient tolerated well, slow and gentle cares , and distraction  Treatment goal: Heal , Infection control/prevention, Protection, and Simplify plan of care  STATUS: initial assessment  Supplies ordered: gathered, at bedside, supplies stored on unit, discussed with RN, and discussed with patient        Treatment Plan:     Buttocks wound(s): BID and after episodes of incontinence  Cleanse skin with Martir spray and soft white Austin cloths   Apply CriticAid barrier paste  Follow PIP plan    Pressure Injury Prevention (PIP) Plan:  If patient is declining pressure injury prevention interventions: Explore reason why and address patient's concerns, Educate on pressure injury risk and prevention " "intervention(s), If patient is still declining, document \"informed refusal\" , and Ensure Care team is aware ( provider, charge nurse, etc)  Mattress: Follow bed algorithm, add Low Air Loss (Air+) mattress pump if skin is very moist or constantly moist.   HOB: Maintain at or below 30 degrees, unless contraindicated  Repositioning in bed: Every 1-2 hours , Left/right positioning; avoid supine, and Raise foot of bed prior to raising head of bed, to reduce patient sliding down (shear)  Heels: Keep elevated off mattress and Pillows under calves  Protective Dressing: None  Positioning Equipment:None  Chair positioning: Assist patient to reposition hourly and Do NOT use a donut for sitting (this increases pressure to smaller area and creates a higher potential for injury)    If patient has a buttock pressure injury, or high risk for PI use chair cushion or SPS.  Moisture Management: Perineal cleansing /protection: Follow Incontinence Protocol, Avoid brief in bed, Clean and dry skin folds with bathing , and Moisturize dry skin  Under Devices: Inspect skin under all medical devices during skin inspection , Ensure tubes are stabilized without tension, and Ensure patient is not lying on medical devices or equipment when repositioned  Ask provider to discontinue device when no longer needed.       Orders: Written    RECOMMEND PRIMARY TEAM ORDER: None, at this time  Education provided: importance of repositioning, plan of care, wound progress, Infection prevention , Moisture management, Hygiene, and Off-loading pressure  Discussed plan of care with: Patient and Nurse  Notify WOC if wound(s) deteriorate.  Nursing to notify the Provider(s) and re-consult the WOC Nurse if new skin concern.    DATA:     Current support surface: Standard  Standard gel mattress (Isoflex)  Containment of urine/stool: Incontinence Protocol and Incontinent pad in bed  BMI: Body mass index is 31.99 kg/m .   Active diet order: Orders Placed This " Encounter      Renal Diet (non-dialysis)       Output:   I/O last 3 completed shifts:  In: 135 [NG/GT:135]  Out: -      Labs:   Recent Labs   Lab 03/28/25  0320   ALBUMIN 3.4*   HGB 8.1*   WBC 21.3*     Pressure injury risk assessment:   Sensory Perception: 3-->slightly limited  Moisture: 3-->occasionally moist  Activity: 1-->bedfast  Mobility: 2-->very limited  Nutrition: 3-->adequate  Friction and Shear: 1-->problem  Ben Score: 13    Jennifer Carney RN CWCN  Pager no longer is use, please contact through [employer] group: WOC Nurse Eva

## 2025-03-28 NOTE — PROGRESS NOTES
Care Management Follow Up    Length of Stay (days): 0    Expected Discharge Date: 03/30/2025     Concerns to be Addressed:       Patient plan of care discussed at interdisciplinary rounds: Yes    Anticipated Discharge Disposition: Home, Home Care, Transitional Care     Anticipated Discharge Services: Transportation Services, Home Care  Anticipated Discharge DME:      Patient/family educated on Medicare website which has current facility and service quality ratings: no  Education Provided on the Discharge Plan:    Patient/Family in Agreement with the Plan: yes    Referrals Placed by CM/SW:    Private pay costs discussed: Not applicable    Discussed  Partnership in Safe Discharge Planning  document with patient/family: No     Handoff Completed: No, handoff not indicated or clinically appropriate    Additional Information:  Called [facility] (Phone: [phone]) and spoke with  regarding pt's MA. Was informed that pt's MA is listed as active now, it did go through the final formal review today.     Also received message from [facility], they can do weekend admissions now for patients that have already been with them. So if pt were to be ready they can accept back this weekend. Pt is on 2G floor.  RN# [phone]  Fax# [phone]  Message to hospitalist, who stated pt likely needs 2-3 more days. Patient likely not ready over the weekend.     Spoke with daughter Caroline Gray about the MA being active, she states they sat on hold for a couple hours as well and did hear this from the [facility] too. Updated that [facility] actually does do weekend admissions for current residents, but that it sounds that pt likely wouldn't be ready this weekend anyway, per hospitalist. Caroline Gray is asking that the hospitalist give her a call when they can today. SW confirmed they will relay this request. Discussed that PT evaluation will be helpful in determining what home DME would be needed for patient, if that  is the plan at discharge. Caroline Gray very appreciative of the follow-up call.    TOMER Vicente  Social Work  [facility]
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
## 2025-03-28 NOTE — PROGRESS NOTES
Renal Medicine Progress Note            Assessment/Plan:     Supriya Herr is a 75 year old female who was admitted on 3/27/2025.      Assessment:  1) end-stage renal disease, chronic Monday Wednesday Friday dialysis  Dialysis at [facility] (formerly [facility])  Nephrologist Dr. Lim  Access: Left upper AV fistula, 15 gauge needles  Run time 3.5 hours  Target weight:89.3kg, yesterday post weight 88.7kg  Rx: 2K bath, heparin 1500 load, 800/hr     Anemia: 3/26 as outpatent 8.5, mircera 50mcg given 3/21.  Today 8.1.     CKD-MBD: off binders with low phos last month,      2) recurrent dyspnea: Clearly losing weight with 20-25 pound weight loss in the last few months.  Still does not look grossly volume overloaded and is unclear her recent dyspnea especially on Sundays as purely volume related.  Still, will try to challenge her weight with dialysis today along with management.     3) multiple GI issues noted.  GI consult pending     Other:  Diabetes  Obesity  Gram-positive bacteremia, on empiric antibiotics.  ID consulted today.       Plan/Recs:  1) dialysis planned today, will be this afternoon  2) 1-2 kg UF goal, midodrine 10 mg with HD  3) Retacrit ordered with HD today    [facility]  Office: [phone]  Cell: [phone]        Interval History:     Patient denies any significant retching or emesis.  Sleeping comfortably.  Notable set of blood cultures with gram-positive cocci in pairs and chains.  Started on meropenem.  Tmax 99.5              Medications and Allergies:     Current Facility-Administered Medications   Medication Dose Route Frequency Provider Last Rate Last Admin    amiodarone (PACERONE) tablet 200 mg  200 mg Oral or Feeding Tube BID Ekaterina Gayle MD   200 mg at 03/27/25 2227    [START ON 3/29/2025] amLODIPine (NORVASC) tablet 10 mg  10 mg Oral or Feeding Tube Once per day on Sunday Tuesday Thursday Saturday Ekaterina Gayle MD        apixaban ANTICOAGULANT (ELIQUIS) tablet 5  mg  5 mg Oral or Feeding Tube BID Ekaterina Gayle MD   5 mg at 03/27/25 2227    atorvastatin (LIPITOR) tablet 20 mg  20 mg Oral or Feeding Tube QPM Ekaterina Gayle MD   20 mg at 03/27/25 2225    calcitRIOL (ROCALTROL) capsule 0.25 mcg  0.25 mcg Oral Once per day on Monday Wednesday Friday Ekaterina Gayle MD        cetirizine (zyrTEC) tablet 10 mg  10 mg Oral At Bedtime Ekaterina Gayle MD   10 mg at 03/27/25 2226    hydrALAZINE (APRESOLINE) tablet 75 mg  75 mg Oral Q8H Truman Guerrero DO   75 mg at 03/28/25 0603    insulin aspart (NovoLOG) injection (RAPID ACTING)  1-7 Units Subcutaneous TID AC Ekaterina Gayle MD        insulin aspart (NovoLOG) injection (RAPID ACTING)  1-5 Units Subcutaneous At Bedtime Ekaterina Gayle MD        insulin glargine (LANTUS PEN) injection 15 Units  15 Units Subcutaneous BID Ekaterina Gayle MD   15 Units at 03/27/25 2301    meropenem (MERREM) 500 mg vial to attach to  mL bag for ADULTS or 25 mL bag for PEDS  500 mg Intravenous Daily Truman Guerrero DO   500 mg at 03/28/25 0604    midodrine (PROAMATINE) tablet 5 mg  5 mg Oral Once per day on Monday Wednesday Friday Ekaterina Gayle MD        pantoprazole (PROTONIX) 2 mg/mL suspension 40 mg  40 mg Per Feeding Tube QAM AC Ekaterina Gayle MD   40 mg at 03/28/25 0630    polyethylene glycol (MIRALAX) Packet 17 g  17 g Oral BID Ekaterina Gayle MD        Prosource TF20 ENfit Compatibl EN LIQD (PROSOURCE TF20) packet 60 mL  1 packet Per Feeding Tube Daily Ekaterina Gayle MD        sennosides (SENOKOT) tablet 2 tablet  2 tablet Oral BID Ekaterina Gayle MD        sertraline (ZOLOFT) tablet 75 mg  75 mg Oral Daily Ekaterina Gayle MD        sevelamer carbonate (RENVELA) tablet 1,600 mg  1,600 mg Oral TID w/meals Ekaterina Gayle MD        sodium chloride (PF) 0.9% PF flush 3 mL  3 mL Intracatheter Q8H [facility] Ekaterina Gayle MD   3 mL at 03/28/25 0604    vitamin D3  "(CHOLECALCIFEROL) tablet 50 mcg  50 mcg Oral Daily Ekaterina Gayle MD            Allergies   Allergen Reactions    Ampicillin-Sulbactam Sodium Rash     No evidence SJS, but very uncomfortable and precipitated multiple provider visits. Would not use penicillins again if other options available.     Penicillins Rash            Physical Exam:   Vitals were reviewed  /45 (BP Location: Right arm)   Pulse 70   Temp 99.3  F (37.4  C) (Oral)   Resp 18   Ht 1.676 m (5' 6\")   Wt 89.9 kg (198 lb 3.1 oz)   LMP  (LMP Unknown)   SpO2 99%   BMI 31.99 kg/m      Wt Readings from Last 3 Encounters:   03/27/25 89.9 kg (198 lb 3.1 oz)   03/25/25 92.5 kg (204 lb)   03/17/25 92.7 kg (204 lb 5.9 oz)       Intake/Output Summary (Last 24 hours) at 3/28/2025 1050  Last data filed at 3/28/2025 0135  Gross per 24 hour   Intake 135 ml   Output --   Net 135 ml       GENERAL: No distress, laying supine without any dyspnea  HEENT:  Normocephalic. No gross abnormalities.   CV: RRR,, 2 out of 6 systolic murmur noted, no peripheral edema of significance noted.  Patient with left upper arm fistula with bruit and thrill  RESP: Clear anteriorly with good efforts  GI: Abdomen soft/nt/nd, BS normal.  PEG tube present  MUSCULOSKELETAL: extremities nl - no gross deformities noted  SKIN: no suspicious lesions or rashes, dry to touch  NEURO: Grossly nonfocal  PSYCH: mood good, affect appropriate           Data:     BMP  Recent Labs   Lab 03/28/25  0735 03/28/25  0320 03/28/25  0122 03/27/25  2130 03/27/25  1135   NA  --  134*  --   --  136   POTASSIUM  --  3.9  --   --  3.6   CHLORIDE  --  97*  --   --  97*   JODY  --  10.8*  --   --  10.9*   CO2  --  26  --   --  26   BUN  --  45.9*  --   --  33.6*   CR  --  3.60*  --   --  2.67*   * 140* 118* 112* 232*     CBC  Recent Labs   Lab 03/28/25  0320 03/27/25  1135   WBC 21.3* 17.4*   HGB 8.1* 9.2*   HCT 25.4* 29.2*   MCV 95 96    224     Lab Results   Component Value Date    AST 11 " 03/28/2025    ALT 16 03/28/2025    ALKPHOS 93 03/28/2025    BILITOTAL 0.4 03/28/2025     Lab Results   Component Value Date    INR 1.57 (H) 02/14/2025       Attestation:  I have reviewed today's vital signs, notes, medications, labs and imaging.    DO [facility]phrology  Office: [phone]  Cell: [phone]

## 2025-03-28 NOTE — PROVIDER NOTIFICATION
MD Notification    Notified Person: MD    Notified Person Name: Redd Guerrero     Notification Date/Time: 3/28/25 05:30    Notification Interaction: Vocera    Purpose of Notification: /38 asking for Scheduled hydralazine parameters     Orders Received:  Hold for sbp < 120   Comments:

## 2025-03-28 NOTE — PROVIDER NOTIFICATION
MD Notification    Notified Person: MD    Notified Person Name: Redd Guerrero    Notification Date/Time: 3/28/25 01:08    Notification Interaction: Vocera     Purpose of Notification: Pt BP elevated and pt in 10/10 abd pain     Orders Received:  PRN IV Labetalol,Dilaudid, PO catapres,   Comments:

## 2025-03-28 NOTE — PHARMACY-VANCOMYCIN DOSING SERVICE
Pharmacy Vancomycin Initial Note  Date of Service 2025  Patient's  1950  75 year old, female    Indication: Bacteremia    Current estimated CrCl = Estimated Creatinine Clearance: 15 mL/min (A) (based on SCr of 3.6 mg/dL (H)).    Creatinine for last 3 days  3/27/2025: 11:35 AM Creatinine 2.67 mg/dL  3/28/2025:  3:20 AM Creatinine 3.60 mg/dL    Recent Vancomycin Level(s) for last 3 days  No results found for requested labs within last 3 days.      Vancomycin IV Administrations (past 72 hours)        No vancomycin orders with administrations in past 72 hours.                    Nephrotoxins and other renal medications (From now, onward)      Start     Dose/Rate Route Frequency Ordered Stop    25 1800  vancomycin (VANCOCIN) 1,750 mg in 0.9% NaCl 517.5 mL intermittent infusion         1,750 mg  over 2 Hours Intravenous ONCE 25 1445              Contrast Orders - past 72 hours (72h ago, onward)      None                Plan:  Start vancomycin 1750mg x 1. Patient is on dialysis so will monitor intermittent levels   Vancomycin monitoring method: Renal Replacement Therapy  Vancomycin therapeutic monitoring goal: 15-20 mg/L  Pharmacy will check vancomycin levels as appropriate in 1-3 Days.    Serum creatinine levels will be ordered daily for the first week of therapy and at least twice weekly for subsequent weeks.      Deena Dinero [facility]

## 2025-03-28 NOTE — PROVIDER NOTIFICATION
MD Notification    Notified Person: MD    Notified Person Name:Ori Guerrero    Notification Date/Time:3/28/25@0509    Notification Interaction:[employer] messaging    Purpose of Notification:Infectious disease lab called with critical lab-Blood culture drawn from rt hand yesterday came back with gram +ve cocci in pairs and chains. Any new orders?    Orders Received:I.V Merrem Q 8hrs, MRSA PCR nasal swab    Comments:

## 2025-03-28 NOTE — PROVIDER NOTIFICATION
MD Notification    Notified Person: MD    Notified Person Name: Ekaterina Gayle    Notification Date/Time: 3/27/25 21:50    Notification Interaction: Tom     Purpose of Notification: Pt in 9/10 pain, does not have IV access     Orders Received:  Place IV, PRN PO dilaudid 2mg  Comments:

## 2025-03-28 NOTE — PROGRESS NOTES
Potassium   Date Value Ref Range Status   03/28/2025 3.9 3.4 - 5.3 mmol/L Final   02/02/2022 4.7 3.4 - 5.3 mmol/L Final   04/17/2021 4.2 3.4 - 5.3 mmol/L Final     Hemoglobin   Date Value Ref Range Status   03/28/2025 8.1 (L) 11.7 - 15.7 g/dL Final   04/16/2021 10.9 (L) 11.7 - 15.7 g/dL Final     Creatinine   Date Value Ref Range Status   03/28/2025 3.60 (H) 0.51 - 0.95 mg/dL Final   04/17/2021 5.98 (H) 0.52 - 1.04 mg/dL Final     Urea Nitrogen   Date Value Ref Range Status   03/28/2025 45.9 (H) 8.0 - 23.0 mg/dL Final   11/24/2021 37 (H) 7 - 30 mg/dL Final   04/17/2021 68 (H) 7 - 30 mg/dL Final     Sodium   Date Value Ref Range Status   03/28/2025 134 (L) 135 - 145 mmol/L Final   04/17/2021 137 133 - 144 mmol/L Final     INR   Date Value Ref Range Status   02/14/2025 1.57 (H) 0.85 - 1.15 Final   04/16/2021 1.14 0.86 - 1.14 Final       DIALYSIS PROCEDURE NOTE  Hepatitis status of previous patient on machine log was checked and verified ok to use with this patients hepatitis status.  Patient dialyzed for 3:15 hrs. on a K3 bath with a net fluid removal of  1.5L.  A BFR of 450 ml/min was obtained via a Left AVF using 15 gauge needles.      The treatment plan was discussed with Dr. Langley during the treatment.    Total heparin received during the treatment: 0 units.   Needle cannulation sites held x 10 min.       Meds  given: Epogen 10,000   Complications: none      Person educated: patient. Knowledge base substantial. Barriers to learning: none. Educated on procedure via verbal mode. The patient/family verbalized understanding. Pt prefers verbal education style.     ICEBOAT? Timeout performed pre-treatment  I: Patient was identified using 2 identifiers  C:  Consent Signed Yes  E: Equipment preventative maintenance is current and dialysis delivery system OK to use  B:    Latest Reference Range & Units 03/03/25 09:37 03/05/25 13:56   Hep B Surface Agn Nonreactive  Nonreactive Nonreactive   Hepatitis B Core Liv Nonreactive   Nonreactive    Hepatitis B Surface Antibody Instrument Value <8.5 m[IU]/mL <3.50    Hepatitis B Surface Antibody  Nonreactive      O: Dialysis orders present and complete prior to treatment  A: Vascular access verified and assessed prior to treatment  T: Treatment was performed at a clinically appropriate time  ?: Patient was allowed to ask questions and address concerns prior to treatment  See Adult Hemodialysis flowsheet in [facility] for further details and post assessment.  Machine water alarm in place and functioning. Transducer pods intact and checked every 15min.   Pt returned via bed.  Chlorine/Chloramine water system checked every 4 hours.  Outpatient Dialysis at [facility]    Patient repositioned every 2 hours during the treatment.  Post treatment report given to SHANTA Valle regarding 1.5L of fluid removed, last BP of 133/45, and patient pain rating of 0/10.     Please remove patient dressing on AVF and AVG needle sites 24 hours after dialysis. If leaking occurs please apply a Band-Aid.

## 2025-03-29 ENCOUNTER — APPOINTMENT (OUTPATIENT)
Dept: CARDIOLOGY | Facility: CLINIC | Age: 75
End: 2025-03-29
Attending: PHYSICIAN ASSISTANT
Payer: COMMERCIAL

## 2025-03-29 LAB
ALBUMIN SERPL BCG-MCNC: 3.2 G/DL (ref 3.5–5.2)
ALP SERPL-CCNC: 82 U/L (ref 40–150)
ALT SERPL W P-5'-P-CCNC: 14 U/L (ref 0–50)
ANION GAP SERPL CALCULATED.3IONS-SCNC: 12 MMOL/L (ref 7–15)
AST SERPL W P-5'-P-CCNC: 12 U/L (ref 0–45)
BACTERIA UR CULT: ABNORMAL
BILIRUB DIRECT SERPL-MCNC: 0.11 MG/DL (ref 0–0.3)
BILIRUB SERPL-MCNC: 0.3 MG/DL
BUN SERPL-MCNC: 33.3 MG/DL (ref 8–23)
CALCIUM SERPL-MCNC: 10.4 MG/DL (ref 8.8–10.4)
CHLORIDE SERPL-SCNC: 97 MMOL/L (ref 98–107)
CREAT SERPL-MCNC: 2.65 MG/DL (ref 0.51–0.95)
EGFRCR SERPLBLD CKD-EPI 2021: 18 ML/MIN/1.73M2
ERYTHROCYTE [DISTWIDTH] IN BLOOD BY AUTOMATED COUNT: 15.2 % (ref 10–15)
GLUCOSE BLDC GLUCOMTR-MCNC: 101 MG/DL (ref 70–99)
GLUCOSE BLDC GLUCOMTR-MCNC: 166 MG/DL (ref 70–99)
GLUCOSE BLDC GLUCOMTR-MCNC: 195 MG/DL (ref 70–99)
GLUCOSE BLDC GLUCOMTR-MCNC: 81 MG/DL (ref 70–99)
GLUCOSE BLDC GLUCOMTR-MCNC: 88 MG/DL (ref 70–99)
GLUCOSE SERPL-MCNC: 104 MG/DL (ref 70–99)
HCO3 SERPL-SCNC: 27 MMOL/L (ref 22–29)
HCT VFR BLD AUTO: 25.9 % (ref 35–47)
HGB BLD-MCNC: 7.9 G/DL (ref 11.7–15.7)
LVEF ECHO: NORMAL
MCH RBC QN AUTO: 29.5 PG (ref 26.5–33)
MCHC RBC AUTO-ENTMCNC: 30.5 G/DL (ref 31.5–36.5)
MCV RBC AUTO: 97 FL (ref 78–100)
PLATELET # BLD AUTO: 196 10E3/UL (ref 150–450)
POTASSIUM SERPL-SCNC: 3.8 MMOL/L (ref 3.4–5.3)
PROT SERPL-MCNC: 6 G/DL (ref 6.4–8.3)
RBC # BLD AUTO: 2.68 10E6/UL (ref 3.8–5.2)
SODIUM SERPL-SCNC: 136 MMOL/L (ref 135–145)
WBC # BLD AUTO: 9.9 10E3/UL (ref 4–11)

## 2025-03-29 PROCEDURE — 85014 HEMATOCRIT: CPT | Performed by: STUDENT IN AN ORGANIZED HEALTH CARE EDUCATION/TRAINING PROGRAM

## 2025-03-29 PROCEDURE — 255N000002 HC RX 255 OP 636: Performed by: PHYSICIAN ASSISTANT

## 2025-03-29 PROCEDURE — 82248 BILIRUBIN DIRECT: CPT | Performed by: STUDENT IN AN ORGANIZED HEALTH CARE EDUCATION/TRAINING PROGRAM

## 2025-03-29 PROCEDURE — 999N000208 ECHOCARDIOGRAM COMPLETE

## 2025-03-29 PROCEDURE — 120N000001 HC R&B MED SURG/OB

## 2025-03-29 PROCEDURE — 99232 SBSQ HOSP IP/OBS MODERATE 35: CPT | Performed by: STUDENT IN AN ORGANIZED HEALTH CARE EDUCATION/TRAINING PROGRAM

## 2025-03-29 PROCEDURE — 99232 SBSQ HOSP IP/OBS MODERATE 35: CPT | Performed by: INTERNAL MEDICINE

## 2025-03-29 PROCEDURE — 250N000013 HC RX MED GY IP 250 OP 250 PS 637: Performed by: STUDENT IN AN ORGANIZED HEALTH CARE EDUCATION/TRAINING PROGRAM

## 2025-03-29 PROCEDURE — 87040 BLOOD CULTURE FOR BACTERIA: CPT | Performed by: STUDENT IN AN ORGANIZED HEALTH CARE EDUCATION/TRAINING PROGRAM

## 2025-03-29 PROCEDURE — 250N000013 HC RX MED GY IP 250 OP 250 PS 637: Performed by: HOSPITALIST

## 2025-03-29 PROCEDURE — 80048 BASIC METABOLIC PNL TOTAL CA: CPT | Performed by: STUDENT IN AN ORGANIZED HEALTH CARE EDUCATION/TRAINING PROGRAM

## 2025-03-29 PROCEDURE — 93306 TTE W/DOPPLER COMPLETE: CPT | Mod: 26 | Performed by: INTERNAL MEDICINE

## 2025-03-29 RX ADMIN — HYDRALAZINE HYDROCHLORIDE 75 MG: 25 TABLET ORAL at 15:38

## 2025-03-29 RX ADMIN — CETIRIZINE HYDROCHLORIDE 10 MG: 10 TABLET, FILM COATED ORAL at 21:59

## 2025-03-29 RX ADMIN — INSULIN GLARGINE 12 UNITS: 100 INJECTION, SOLUTION SUBCUTANEOUS at 20:28

## 2025-03-29 RX ADMIN — APIXABAN 5 MG: 5 TABLET, FILM COATED ORAL at 10:45

## 2025-03-29 RX ADMIN — Medication 60 ML: at 16:17

## 2025-03-29 RX ADMIN — SERTRALINE HYDROCHLORIDE 75 MG: 50 TABLET ORAL at 10:45

## 2025-03-29 RX ADMIN — Medication 50 MCG: at 15:38

## 2025-03-29 RX ADMIN — HYDRALAZINE HYDROCHLORIDE 75 MG: 25 TABLET ORAL at 05:56

## 2025-03-29 RX ADMIN — HUMAN ALBUMIN MICROSPHERES AND PERFLUTREN 3 ML: 10; .22 INJECTION, SOLUTION INTRAVENOUS at 13:28

## 2025-03-29 RX ADMIN — AMLODIPINE BESYLATE 10 MG: 10 TABLET ORAL at 10:42

## 2025-03-29 RX ADMIN — APIXABAN 5 MG: 5 TABLET, FILM COATED ORAL at 20:27

## 2025-03-29 RX ADMIN — AMIODARONE HYDROCHLORIDE 200 MG: 200 TABLET ORAL at 20:27

## 2025-03-29 RX ADMIN — ATORVASTATIN CALCIUM 20 MG: 20 TABLET, FILM COATED ORAL at 20:27

## 2025-03-29 RX ADMIN — Medication 40 MG: at 10:42

## 2025-03-29 RX ADMIN — HYDRALAZINE HYDROCHLORIDE 75 MG: 25 TABLET ORAL at 21:59

## 2025-03-29 RX ADMIN — AMIODARONE HYDROCHLORIDE 200 MG: 200 TABLET ORAL at 10:45

## 2025-03-29 ASSESSMENT — ACTIVITIES OF DAILY LIVING (ADL)
ADLS_ACUITY_SCORE: 77
ADLS_ACUITY_SCORE: 75
ADLS_ACUITY_SCORE: 76
ADLS_ACUITY_SCORE: 75
ADLS_ACUITY_SCORE: 77
ADLS_ACUITY_SCORE: 75

## 2025-03-29 NOTE — PROGRESS NOTES
"[facility]    Medicine Progress Note - Hospitalist Service    Date of Admission:  3/27/2025    Assessment & Plan      Supriya Herr is a 75 year old female with very complex past medical history including end-stage renal disease on hemodialysis, HFpEF, paroxysmal atrial fibrillation, hypertension, hyperlipidemia, anxiety, depression, chronic anemia, dysphagia status post PEG tube, type 2 diabetes, left traumatic AKA and recent hospitalization with multifactorial respiratory failure now being admitted on 3/27/2025 with abdominal pain and retching. She is found to have e/o constipation. But other evaluation notable for elevated WBC, procal and CRP of unclear etiology.     Pf note, pt with multiple recent hospitalizations. Per last discharge summary:   \"Hospitalized at [facility] from 3/2 - 3/6/2025 with shortness of breath likely multifactorial.  Volume status was difficult to determine, underwent right heart cath on 3/5 showed mildly elevated right atrial and PCWP readings.  Was treated for acute respiratory failure likely from heart failure exacerbation, atrial fibrillation, end-stage renal disease and discharged to care facility with supplemental nocturnal oxygen.\"  \"Prolonged hospitalized at [facility] from 1/8 to 2/26/2025 for acute hypoxic respiratory failure multifactorial, was intubated ( 1/9-1/18), reintubated (1/20 - 1/25).  Then was treated for shock with pressors, influenza A pneumonia, hospital-acquired pneumonia with intravenous antibiotics, steroids, antivirals.  Then also noted to have heart failure exacerbation, recurrent A-fib with RVR, subsequent bradycardia.  Then also noted to have encephalopathy likely from infectious, metabolic etiology and delirium.  During that hospitalization was noted to have dysphagia, pharyngeal edema, was evaluated by ENT on 2/16.  No findings to explain dysphagia.  G-tube was placed by IR on 2/17 and was on nocturnal tube feeds.  Discharged to TCU for " "rehabilitation.\"    Abdominal pain  Retching, with Hx of GERD   Dysphagia s/p PEG tube (2/1/2025)   Constipation, rectal stool ball   Pt presents to the ED with vague complaints of retching/dry heaving without vomiting and some crampy lower abdominal pain.   * CT scan showed large rectal stool ball without evidence of obstruction. There was also some non-specific mild urinary bladder wall thickening. No other intra-abdominal pathology.   - Unclear exactly what is going on. Pt did not have any retching or other symptoms while I was in the room. Perhaps she has some reflux of tube feeds or other esophageal issue. On my discussion with her 3/28, she did not have any concerns about this but clearly was an issue when she came in.  - Will order bowel regimen to work on the constipation   - GI following   - NPO at midnight and hold tube feeds at midnight for EGD tomorrow   - XR esophagram  - Zofran available PRN    Leukocytosis  Elevated procalcitonin   Elevated CRP  E. Faecalis bacteremia  WBC elevated to 17.4, Procal elevated to 1.3, and CRP elevated to 39.78.   Unclear infectious source. She has some mild non-specific urinary bladder wall thickening, but with ESRD makes very little urine. Also has a new MIKAYLA 3mm nodule which may be infectious or inflammatory, but no respiratory complaints.   * COVID, flu RSV pending   * UA very abnormal but difficult to interpret in the setting of ESRD. Ucx positive for E faecalis.  * Bcx positive for E. Faecalis. Unclear source. Concern for endocarditis given how quickly blood cultures are coming back positive  - ID consulted for assistance   - TTE   - vancomycin   - Repeat Bcx until cleared  - Follow-up blood cultures  - Trend WBC, procal, and CRP     End-stage renal disease on HD   Dialyzes MWF via L arm fistula. Per prior notes, volume removal has often been limited by intra dialytic hypotension and a fib with RVR   - Nephrology consulted   - Continue PTA midodrine MWF before " dialysis   - Continue PTA calcitriol, renvela, vit D.     Heart failure with preserved ejection fraction  Markedly elevated BNP without clinical features of exacerbation   Paroxysmal atrial fibrillation on chronic anticoagulation  Hypertension  Hyperlipidemia  Troponin elevation, chronic (85->85)  [PTA medications include: amiodarone 200mg BID, amlodipine 10mg daily, Apixaban 5mg BID, atorvastatin 20mg daily, hydralazine 75mg Q8H,   *TTE 1/21/2025 with LVEF of 60%.  No valvular abnormality   * RHC 3/5/25 showing moderately elevated right and left-sided filling pressure; elevated wedge pressure consistent with pulmonary hypertension  * On admission now, pt has markedly elevated BNP to 51,544 (though historically is in the 20,000-40,000 range), She does have small pleural effusions on CT, but no other overt evidence of volume overload or clinical HF.   - I/Os and daily weights ordered  - continue PTA apixaban 5 mg BID  - continue PTA atorvastatin 20 mg daily  - continue PTA hydralazine 75 mg q8H  - continue PTA amiodarone 200 mg BID  - continue PTA amlodipine 10 m qday  - Volume management per nephrology    Anxiety/depression/insomnia  - Continue PTA zoloft     Anemia of chronic disease  Hemoglobin 9.2 on admission, stable   - Monitor CBC       Type II DM  - Continue PTA lantus 15 units BID   - MDSSI     Physical deconditioning from medical illness, senile frailty.  S/p left above-knee amputation, traumatic  Patient from TCU, daughter hopeful pt will be able to discharge back home with services rather than going back to TCU.   - PT/OT consulted      Sacrococcygeal Pressure injury, Stage 3, present on admission  Wound care team followed.  Pressure injury prevention.      Obesity with a BMI of 34.  Increase all-cause mortality and morbidity.          Diet: Adult Formula Drip Feeding: Continuous Elastera Renal Support; Gastrostomy; Goal Rate: 65; mL/hr; From: 4:00 PM; To: 6:00 AM; Resume nocturnal TF tonight at 4:00 pm  "-- Run at 65 mL/hr daily from 4:00 pm to 6:00 am  Renal Diet (dialysis)  NPO for Procedure/Surgery per Anesthesia Guidelines Except for: Meds; Clear liquids before procedure/surgery: ADULT (Age GREATER than or Equal to 18 years) - Clear liquids 2 hours before procedure/surgery    DVT Prophylaxis: DOAC  Bradshaw Catheter: Not present  Lines: None     Cardiac Monitoring: None  Code Status: No CPR- Pre-arrest intubation OK      Clinically Significant Risk Factors         # Hyponatremia: Lowest Na = 134 mmol/L in last 2 days, will monitor as appropriate  # Hypochloremia: Lowest Cl = 97 mmol/L in last 2 days, will monitor as appropriate   # Hypercalcemia: Highest Ca = 10.8 mg/dL in last 2 days, will monitor as appropriate    # Hypoalbuminemia: Lowest albumin = 3.2 g/dL at 3/29/2025 11:39 AM, will monitor as appropriate       # Hypertension: Noted on problem list  # Chronic heart failure with preserved ejection fraction: heart failure noted on problem list and last echo with EF >50%          # DMII: A1C = 6.5 % (Ref range: <5.7 %) within past 6 months   # Obesity: Estimated body mass index is 31.63 kg/m  as calculated from the following:    Height as of this encounter: 1.676 m (5' 6\").    Weight as of this encounter: 88.9 kg (195 lb 15.8 oz)., PRESENT ON ADMISSION       # Financial/Environmental Concerns:           Social Drivers of Health    Tobacco Use: Medium Risk (3/25/2025)    Patient History     Smoking Tobacco Use: Former     Smokeless Tobacco Use: Never   Physical Activity: Insufficiently Active (7/19/2024)    Exercise Vital Sign     Days of Exercise per Week: 1 day     Minutes of Exercise per Session: 10 min   Social Connections: Unknown (7/19/2024)    Social Connection and Isolation Panel [NHANES]     Frequency of Social Gatherings with Friends and Family: More than three times a week          Disposition Plan     Medically Ready for Discharge: Anticipated in 2-4 Days pending evaluation of bacteremia       "       Ganesh Lawson MD  Hospitalist Service  [facility]  Securely message with [employer] (more info)  Text page via [employer] Paging/Directory   ______________________________________________________________________    Interval History   Blood cultures coming back positive very quickly. Concern for endocarditis. TTE pending. Will almost certainly need SHAHNAZ.  Discussed with Dr. Servin. Plan for EGD tomorrow for evaluation of dysphagia.      Physical Exam   Vital Signs: Temp: 97.6  F (36.4  C) Temp src: Oral BP: (!) 156/40 Pulse: 68   Resp: 18 SpO2: 95 % O2 Device: None (Room air) Oxygen Delivery: 1 LPM  Weight: 195 lbs 15.82 oz  Constitutional: Awake, alert, cooperative, no apparent distress.    Pulmonary: No increased work of breathing, good air exchange, clear to auscultation bilaterally, no crackles or wheezing.  Cardiovascular: Regular rate and rhythm, normal S1 and S2, no S3 or S4. No murmurs, rubs, or gallops noted.  GI: Normal bowel sounds, soft, non-distended, mild tenderness of abdomen generally.  PEG tube in place. No guarding or rebound.  Skin/Integumen: No cyanosis or jaundice. No rashes noted.  Extremities: No lower extremity edema.  Neuro: A&Ox4. Conversant. Responds appropriately in conversation. Moves all extremities with no focal deficit identified.      Medical Decision Making     39 MINUTES SPENT BY ME on the date of service doing chart review, history, exam, documentation & further activities per the note.      Data   ------------------------- PAST 24 HR DATA REVIEWED -----------------------------------------------    I have personally reviewed the following data over the past 24 hrs:    9.9  \   7.9 (L)   / 196     136 97 (L) 33.3 (H) /  81   3.8 27 2.65 (H) \     ALT: 14 AST: 12 AP: 82 TBILI: 0.3   ALB: 3.2 (L) TOT PROTEIN: 6.0 (L) LIPASE: N/A       Imaging results reviewed over the past 24 hrs:   Recent Results (from the past 24 hours)   Echocardiogram Complete   Result  Value    LVEF  60-65%    [facility]    [id number]  [id number]  [id number]  [id number]^PAUL^JEET^DEBORAH     [facility]  Echocardiography Laboratory  [address] [place] [address]     Name: BROOKE ARANA  MRN: [id number]  : 1950  Study Date: 2025 01:02 PM  Age: 75 yrs  Gender: Female  Patient Location: [facility]  Reason For Study: Endocarditis  Ordering Physician: JEET MILLER  Referring Physician: Noam Jackson  Performed By: Amilcar Rosas RDCS     BSA: 2.0 m2  Height: 66 in  Weight: 198 lb  HR: 66  BP: 160/48 mmHg  ______________________________________________________________________________  Procedure  Echocardiogram with two-dimensional, color and spectral Doppler. Optison (NDC  #1200-6150) given intravenously.  ______________________________________________________________________________  Interpretation Summary     Left ventricular wall thickness not well seen however appears increased  consistent with severe concentric hypertrophy.  Left ventricular systolic function is normal. The visual ejection fraction is  60-65%.  The right ventricle is normal in size and function.  No significant valvular abnormalities though visualization limited. Recommend  SHAHNAZ if concern for endocarditis.  IVC diameter <2.1 cm collapsing >50% with sniff suggests a normal RA pressure  of 3 mmHg.  ______________________________________________________________________________  Left Ventricle  The left ventricle is normal in size. There is severe concentric left  ventricular hypertrophy. Left ventricular systolic function is normal. The  visual ejection fraction is 60-65%. Left ventricular diastolic function is not  assessable. No regional wall motion abnormalities noted.     Right Ventricle  The right ventricle is normal in size and function.     Atria  Normal left atrial size. Right atrial size is normal.     Mitral Valve  There is mild to moderate mitral annular calcification.  There is mild (1+)  mitral regurgitation.     Tricuspid Valve  There is trace tricuspid regurgitation.     Aortic Valve  There is mild trileaflet aortic sclerosis. The aortic valve is not well  visualized. No aortic regurgitation is present.     Pulmonic Valve  The pulmonic valve is not well seen, but is grossly normal.     Vessels  The aortic root is normal size. Normal size ascending aorta. IVC diameter <2.1  cm collapsing >50% with sniff suggests a normal RA pressure of 3 mmHg.     Pericardium  There is no pericardial effusion.     ______________________________________________________________________________  MMode/2D Measurements & Calculations  IVSd: 1.5 cm  LVIDd: 5.7 cm  LVIDs: 3.5 cm  LVPWd: 1.3 cm  FS: 38.7 %  LV mass(C)d: 360.4 grams  LV mass(C)dI: 181.0 grams/m2     asc Aorta Diam: 3.4 cm  LVOT diam: 2.1 cm  LVOT area: 3.5 cm2  Asc Ao diam index BSA (cm/m2): 1.7  Asc Ao diam index Ht(cm/m): 2.0  RWT: 0.45  TAPSE: 3.7 cm     Doppler Measurements & Calculations  MV E max del: 116.0 cm/sec  MV A max del: 108.0 cm/sec  MV E/A: 1.1  MV dec time: 0.22 sec  PA acc time: 0.11 sec  E/E' av.0  Lateral E/e': 10.3  Medial E/e': 17.8     ______________________________________________________________________________  Report approved by: Eleazar Eckert MD on 2025 03:22 PM

## 2025-03-29 NOTE — PLAN OF CARE
Goal Outcome Evaluation:    Summary: abdominal pain and retching.  Orientation: A/Ox3; forgetful, intermittent confusion at baseline  Observation Goals (met & not met): Not met  Activity Level: Ax2 w/ lift, turn & repo at baseline, L BKA   Fall Risk: Yes  Behavior & Aggression Tool Color: Green  Pain Management: denies pain  ABNL VS/O2: VSS on 1-2L via NC for comfort, CPAP during NOC  ABNL Lab/BG: See flowsheets   Diet: Renal diet, NOC TF thru PEG tube; holding tonight per possible endoscopy tomorrow, meds thru Gtube  Bowel/Bladder: Incont large loose watery stool; cdiff workup ordered, not producing urine on HD  Drains/Devices: RPIV SL, PEG tube   Tests/Procedures for next shift: HD MWF, had run today; 1.5L removed   Anticipated DC date: Pending   Other Important Info: no emesis episodes today, had dialysis run. Tele NSR. Started on IV vanco d/t positive cultures; see results. MRSA neg. Sacral wound; WOC seen, no mepi, only barrier cream

## 2025-03-29 NOTE — PROGRESS NOTES
Patient refused the masks available in the hospital. She requested a different mask and explained to her what is available. Patient stayed on 2L NC and sats mid 90s.    Margret San, RT  3/29/2025

## 2025-03-29 NOTE — PROVIDER NOTIFICATION
MD Notification    Notified Person: MD    Notified Person Name: Dr. Lawson    Notification Date/Time: 0736 3/29/2025    Notification Interaction: Wimba messaging    Purpose of Notification: Rm 518 J.H., + gram cocci in pairs & chains from blood cultures.    Orders Received: provider acknowledged.     Comments: patient currently on IV vanco      0746: second call received from ID lab, blood culture drawn yesterday also gram + cocci in pairs & chains. Provider aware.

## 2025-03-29 NOTE — PLAN OF CARE
PRIMARY Concern: Abd pain/ Gen. Weakness  SAFETY RISK Concerns (fall risk, behaviors, etc.): Fall risk  Aggression Tool Color: Green  Isolation/Type: Enteric to r/o  Tests/Procedures for NEXT shift: For XR Esophagram today, Echo, pending stool sample  Consults? (Pending/following, signed-off?) GI, WOC, PT, ID  Where is patient from? (Home, TCU, etc.): TCU [facility]  Other Important info for NEXT shift: Pt refused repositioning on other side, and refused her pads to get changed this morning. Has L arm fistula, L AKA, HD schedule MWF  Anticipated DC date & active delays: TBD    SUMMARY NOTE:  Orientation/Cognitive: Disoriented to time & situation  Observation Goals (Met/ Not Met): INP  Mobility Level/Assist Equipment: A2 Lift  Antibiotics & Plan (IV/po, length of tx left): Vanco IV  Pain Management: Denies  Complete Pain Reassessment: Y/N Y Due next: next shift  Tele/VS/O2: VSS on 2L NC, Tele: SR  ABNL Lab/BG: Na, 134, Crea 3.60, Trop 85, WBC 21.3, Hgb 8.1  Diet: NPO Mn  Bowel/Bladder: incont  Skin Concerns: Left buttocks open woun- wound care applied  Drains/Devices: PIVSL/ O2 support  Patient Stated Goal for Today: none

## 2025-03-29 NOTE — PLAN OF CARE
3/28/25  [id number]    Orientation: A/Ox3, intermittent confusion    Vitals/Tele: VSS    IV Access/drains:  RPIV infusing Vanco    Diet: NPO for possible endoscopy tomorrow     Mobility: Ax2 w lift T/R    GI/: Incontinent of stool, med loose stool x1 this shift    Wound/Skin: Sacral wound; WOC seen, no mepi, only barrier cream       Discharge Plan: TBD      See Flow sheets for assessment

## 2025-03-29 NOTE — PROGRESS NOTES
[facility]  Infectious Disease Progress Note          Assessment and Plan:   Date of Admission:  3/27/2025  Date of Consult (When I saw the patient): 03/28/25        Assessment & Plan  Supriya Herr is a 75 year old female who was admitted on 3/27/2025.      Impression:  76 yo female with a history of ESRD on HD, CHF, DM, HTN, JONE, traumatic left AKA, and obesity who presented with abdominal pain and dry heaves and has since been found to have E. Faecalis bacteremia and urine culture also growing E. Faecalis.      -Afebrile.   -Leukocytosis.   -Blood cultures from 3/27: 4 of 4 with E. Faecalis by verigene testing.   -Urine culture with 50-100K E. Faecalis. Produces very little urine with ESRD. No urinary symptoms.   -CT C/A/P with mild bladder wall thickening, rectal stool ball, no abscess.   -On Meropenem  -Allergy listed to Unasyn and Penicillin (rash).      Recommendations:   High-grade Enterococcus bacteremia, blood cultures all rapidly positive even after start of antibiotics, high probability this is endocarditis  Urine may either be source initially or spillover, imaging without obvious abscess, obstruction etc  Can get transthoracic echocardiogram but almost certainly will need transesophageal echocardiogram  For now vancomycin,.  Possible rechallenge of penicillin allergy, likely addition of ceftriaxone synergistic treatment.  The transesophageal echocardiogram almost certainly be needed here as the treatment will be dramatically different between endocarditis and simple bacteremia           Interval History:     Feels okay generalized achiness and malaise, cognition okay, temp is down no major fever symptoms.  No focal pain sites.  Follow-up blood cultures from March 28 rapidly positive              Medications:     Current Facility-Administered Medications   Medication Dose Route Frequency Provider Last Rate Last Admin    amiodarone (PACERONE) tablet 200 mg  200 mg Oral or  Feeding Tube BID Ekaterina Gayle MD   200 mg at 03/28/25 1826    amLODIPine (NORVASC) tablet 10 mg  10 mg Oral or Feeding Tube Once per day on Sunday Tuesday Thursday Saturday Ekaterina Gayle MD        apixaban ANTICOAGULANT (ELIQUIS) tablet 5 mg  5 mg Oral or Feeding Tube BID Ekaterina Gayle MD   5 mg at 03/28/25 2259    atorvastatin (LIPITOR) tablet 20 mg  20 mg Oral or Feeding Tube QPM Ekaterina Gayle MD   20 mg at 03/28/25 2258    calcitRIOL (ROCALTROL) capsule 0.25 mcg  0.25 mcg Oral Once per day on Monday Wednesday Friday Ekaterina Gayle MD   0.25 mcg at 03/28/25 1826    cetirizine (zyrTEC) tablet 10 mg  10 mg Oral At Bedtime Ekaterina Gayle MD   10 mg at 03/28/25 2259    hydrALAZINE (APRESOLINE) tablet 75 mg  75 mg Oral Q8H Truman Guerrero DO   75 mg at 03/29/25 0556    insulin aspart (NovoLOG) injection (RAPID ACTING)  1-7 Units Subcutaneous TID AC Ekaterina Gayle MD        insulin aspart (NovoLOG) injection (RAPID ACTING)  1-5 Units Subcutaneous At Bedtime Ekaterina Gayle MD        insulin glargine (LANTUS PEN) injection 12 Units  12 Units Subcutaneous BID Ganesh Lawson MD        midodrine (PROAMATINE) tablet 5 mg  5 mg Oral Once per day on Monday Wednesday Friday Ekaterina Gayle MD   5 mg at 03/28/25 1826    pantoprazole (PROTONIX) 2 mg/mL suspension 40 mg  40 mg Per Feeding Tube QAM AC Ekaterina Gayle MD   40 mg at 03/28/25 0630    polyethylene glycol (MIRALAX) Packet 17 g  17 g Oral BID Ekaterina Gayle MD        Prosource TF20 ENfit Compatibl EN LIQD (PROSOURCE TF20) packet 60 mL  1 packet Per Feeding Tube Daily Ekaterina Gayle MD   60 mL at 03/28/25 1846    sennosides (SENOKOT) tablet 2 tablet  2 tablet Oral BID Ekaterina Gayle MD        sertraline (ZOLOFT) tablet 75 mg  75 mg Oral Daily Ekaterina Gayle MD   75 mg at 03/28/25 1826    sevelamer carbonate (RENVELA) tablet 1,600 mg  1,600 mg Oral TID w/meals Ekaterina Gayle MD         "sodium chloride (PF) 0.9% PF flush 3 mL  3 mL Intracatheter Q8H VÍCTOR Ekaterina Gayle MD   3 mL at 03/29/25 0616    vitamin D3 (CHOLECALCIFEROL) tablet 50 mcg  50 mcg Oral Daily Ekaterina Gayle MD                      Physical Exam:   Blood pressure 132/40, pulse 68, temperature 97.6  F (36.4  C), temperature source Oral, resp. rate 18, height 1.676 m (5' 6\"), weight 88.9 kg (195 lb 15.8 oz), SpO2 96%, not currently breastfeeding.  Wt Readings from Last 2 Encounters:   03/29/25 88.9 kg (195 lb 15.8 oz)   03/25/25 92.5 kg (204 lb)     Vital Signs with Ranges  Temp:  [97.6  F (36.4  C)-98.5  F (36.9  C)] 97.6  F (36.4  C)  Pulse:  [63-77] 68  Resp:  [16-18] 18  BP: ()/(24-90) 132/40  SpO2:  [85 %-99 %] 96 %    Constitutional: Awake, alert, cooperative, no apparent distress     Lungs: Clear to auscultation bilaterally, no crackles or wheezing   Cardiovascular: Regular rate and rhythm, normal S1 and S2, and no murmur noted   Abdomen: Normal bowel sounds, soft, non-distended, non-tender   Skin: No rashes, no cyanosis, no edema   Other: Looks chronically more than acutely ill, no embolic lesions          Data:   All microbiology laboratory data reviewed.  Recent Labs   Lab Test 03/28/25  0320 03/27/25  1135 03/15/25  0626 03/12/25  0603 03/11/25  0702   WBC 21.3* 17.4*  --   --  5.8   HGB 8.1* 9.2* 8.6*   < > 8.5*   HCT 25.4* 29.2*  --   --  27.4*   MCV 95 96  --   --  97    224  --   --  170    < > = values in this interval not displayed.     Recent Labs   Lab Test 03/28/25  0320 03/27/25  1135 03/17/25  0936   CR 3.60* 2.67* 5.55*     Recent Labs   Lab Test 06/03/24  1644   *     Recent Labs   Lab Test 03/19/21  0900 06/24/18  1100 06/23/18  2348 06/23/18  2347 06/18/18  0949 05/19/18  0119 05/19/18  0056 05/19/18  0022 11/02/17  0600   CULT Light growth  Normal skin shree    Light growth  Streptococcus agalactiae sero group B  This organism is susceptible to ampicillin, penicillin, vancomycin " and the cephalosporins.   If treatment is required AND your patient is allergic to penicillin, contact the   Microbiology Lab within 5 days to request susceptibility testing.  *  Moderate growth  Candida albicans  Susceptibility testing not routinely done  * Moderate growth  Corynebacterium striatum  Identification obtained by MALDI-TOF mass spectrometry research use only database. Test   characteristics determined and verified by the Infectious Diseases Diagnostic Laboratory   ([facility]) [place], [place].  *  Light growth  Staphylococcus aureus  *  Single colony  Beta hemolytic Streptococcus group B  *  Light growth  Gram positive bacilli resembling diphtheroids  No further identification  Susceptibility testing not routinely done  *  Susceptibility testing requested by  Dr. Chen for routine sensitivities on the Corynebacterium striatum on 6.26.18. [place].    Moderate growth  Staphylococcus aureus  Susceptibility testing done on previous specimen  *  Moderate growth  Corynebacterium striatum  Identification obtained by MALDI-TOF mass spectrometry research use only database. Test   characteristics determined and verified by the Infectious Diseases Diagnostic Laboratory   ([facility]) [place], [place].  Susceptibility testing not routinely done  *  Incorrectly ordered by PCU/Clinic  Canceled, Test credited  See Accesion number [id number] No growth No growth Moderate growth  Beta hemolytic Streptococcus group C  *  Moderate growth  Staphylococcus aureus  *  Light growth  Normal skin shree   No growth Moderate growth  Acinetobacter baumannii complex  *  Heavy growth  Beta hemolytic Streptococcus group C  *  Moderate growth  Escherichia coli  *  Moderate growth  Beta hemolytic Streptococcus group B  * No growth No growth

## 2025-03-29 NOTE — PROGRESS NOTES
[facility]  Gastroenterology Progress Note     Supriya Herr MRN# [id number]   YOB: 1950 Age: 75 year old          Assessment and Plan:   Supriya Herr is a 75 year old female with very complex past medical history including end-stage renal disease on hemodialysis, HFpEF, paroxysmal atrial fibrillation, hypertension, hyperlipidemia, anxiety, depression, chronic anemia, dysphagia status post PEG tube, type 2 diabetes, left traumatic AKA and recent hospitalization with multifactorial respiratory failure now being admitted on 3/27/2025 with abdominal pain and retching. She is found to have e/o constipation. But other evaluation notable for elevated WBC, procal and CRP of unclear etiology.      Abdominal pain  Retching, with Hx of GERD   Dysphagia s/p PEG tube (2/1/2025)   Constipation   Pt presents to the ED with vague complaints of retching/dry heaving without vomiting and some crampy lower abdominal pain.   *CT scan showed large rectal stool ball without evidence of obstruction. There was also some non-specific mild urinary bladder wall thickening. No other intra-abdominal pathology.   *Pt noted to have dysphagia, pharyngeal edema, was evaluated by ENT on 2/16.  No findings to explain dysphagia.  G-tube was placed by IR on 2/17 and was on nocturnal tube feeds.     - Pt did not have any retching or other symptoms while I was in the room.  None reported by RN today.  She is not providing any history at this time and will not allow an exam.  - She may have some reflux of tube feeds or other esophageal issue.   - Xray esophagram not completed  - Started bowel regimen, advance as needed  - Zofran as needed  - EGD tomorrow to evaluate for cause of recurrent nausea/vomiting.  Pt in agreement.  - NPO after midnight (and hold Tube feeds at midnight)          Interval History:     doing well; no cp, sob, n/v/d, or abd pain.              Review of Systems:     C: NEGATIVE for  fever, chills, change in weight  E/M: NEGATIVE for ear, mouth and throat problems  R: NEGATIVE for significant cough or SOB  CV: NEGATIVE for chest pain, palpitations or peripheral edema             Medications:   I have reviewed this patient's current medications  Current Facility-Administered Medications   Medication Dose Route Frequency Provider Last Rate Last Admin    - MEDICATION INSTRUCTIONS for Dialysis Patients -   Does not apply See Admin Instructions Ekaterina Gayle MD        amiodarone (PACERONE) tablet 200 mg  200 mg Oral or Feeding Tube BID Ekaterina Gayle MD   200 mg at 03/29/25 1045    amLODIPine (NORVASC) tablet 10 mg  10 mg Oral or Feeding Tube Once per day on Sunday Tuesday Thursday Saturday Ekaterina Gayle MD   10 mg at 03/29/25 1042    apixaban ANTICOAGULANT (ELIQUIS) tablet 5 mg  5 mg Oral or Feeding Tube BID Ekaterina Gayle MD   5 mg at 03/29/25 1045    atorvastatin (LIPITOR) tablet 20 mg  20 mg Oral or Feeding Tube QPM Ekaterina Gayle MD   20 mg at 03/28/25 2258    calcitRIOL (ROCALTROL) capsule 0.25 mcg  0.25 mcg Oral Once per day on Monday Wednesday Friday Ekaterina Gayle MD   0.25 mcg at 03/28/25 1826    cetirizine (zyrTEC) tablet 10 mg  10 mg Oral At Bedtime Ekaterina Gayle MD   10 mg at 03/28/25 2259    hydrALAZINE (APRESOLINE) tablet 75 mg  75 mg Oral Q8H Truman Guerrero DO   75 mg at 03/29/25 0556    insulin aspart (NovoLOG) injection (RAPID ACTING)  1-7 Units Subcutaneous TID AC Ekaterina Gayle MD        insulin aspart (NovoLOG) injection (RAPID ACTING)  1-5 Units Subcutaneous At Bedtime Ekaterina Gayle MD        insulin glargine (LANTUS PEN) injection 12 Units  12 Units Subcutaneous BID Ganesh Lawson MD        midodrine (PROAMATINE) tablet 5 mg  5 mg Oral Once per day on Monday Wednesday Friday Ekaterina Gayle MD   5 mg at 03/28/25 1826    pantoprazole (PROTONIX) 2 mg/mL suspension 40 mg  40 mg Per Feeding Tube SUNILM Ekaterina Chen  MD KENDRA   40 mg at 03/29/25 1042    polyethylene glycol (MIRALAX) Packet 17 g  17 g Oral BID Ekaterina Gayle MD        Prosource TF20 ENfit Compatibl EN LIQD (PROSOURCE TF20) packet 60 mL  1 packet Per Feeding Tube Daily Ekaterina Gayle MD   60 mL at 03/28/25 1846    sennosides (SENOKOT) tablet 2 tablet  2 tablet Oral BID Ekaterina Gayle MD        sertraline (ZOLOFT) tablet 75 mg  75 mg Oral Daily Ekaterina Gayle MD   75 mg at 03/29/25 1045    sevelamer carbonate (RENVELA) tablet 1,600 mg  1,600 mg Oral TID w/meals Ekaterina Gayle MD        sodium chloride (PF) 0.9% PF flush 3 mL  3 mL Intracatheter Q8H ÍVCTOR Ekaterina Gayle MD   3 mL at 03/29/25 0616    vancomycin place cardoso - receiving intermittent dosing  1 each Intravenous See Admin Instructions Ekaterina Gayle MD        vitamin D3 (CHOLECALCIFEROL) tablet 50 mcg  50 mcg Oral Daily Ekaterina Gayle MD                      Physical Exam:   Vitals were reviewed  Vital Signs with Ranges  Temp:  [97.6  F (36.4  C)-98.5  F (36.9  C)] 97.6  F (36.4  C)  Pulse:  [63-77] 68  Resp:  [16-18] 18  BP: ()/(24-90) 149/39  SpO2:  [85 %-99 %] 87 %  I/O last 3 completed shifts:  In: 135 [NG/GT:135]  Out: 1500 [Other:1500]  Constitutional: Alert, oriented to person, place, situation.  Cooperative, lying in bed in NAD.   Respiratory:  Lungs CTAB, no labored breathing.  Cardiovascular:  Heart RRR, no MRG  GI:  Abdomen soft, NT/ND.  PEG tube intact  Skin/Integumen:  Warm, dry, non-diaphoretic.  MSK: Normal muscle tone             Data:   I reviewed the patient's new clinical lab test results.   Recent Labs   Lab Test 03/28/25  0320 03/27/25  1135 03/15/25  0626 03/12/25  0603 03/11/25  0702 02/15/25  0842 02/14/25  1421 06/03/24  1644 04/24/24  0913 08/16/21  1816 04/16/21  0800   WBC 21.3* 17.4*  --   --  5.8   < > 4.4   < > 6.3   < > 6.4   HGB 8.1* 9.2* 8.6*   < > 8.5*   < > 8.4*   < > 10.1*   < > 10.9*   MCV 95 96  --   --  97   < > 98   < >  101*   < > 98    224  --   --  170   < > 184   < > 139*   < > 194   INR  --   --   --   --   --   --  1.57*  --  1.11  --  1.14    < > = values in this interval not displayed.     Recent Labs   Lab Test 03/28/25 0320 03/27/25  1135 03/17/25  0936   POTASSIUM 3.9 3.6 5.1   CHLORIDE 97* 97* 96*   CO2 26 26 31*   BUN 45.9* 33.6* 66.3*   ANIONGAP 11 13 10     Recent Labs   Lab Test 03/28/25  0320 03/27/25  1135 03/10/25  0642 02/26/25  0544 02/08/25  2212 02/05/25 2004 02/02/25  0542 11/22/21  1803 09/03/21  1331 04/11/18  0959 03/29/18  1448   ALBUMIN 3.4* 3.7 3.1*   < >  --    < > 3.2*   < >  --    < >  --    BILITOTAL 0.4 0.4 0.3  --   --    < > 0.2   < >  --    < >  --    ALT 16 22 9  --   --    < > 22   < >  --    < >  --    AST 11 16 12  --   --    < > 12   < >  --    < >  --    PROTEIN  --   --   --   --  70*  --   --   --  100*  --  >499*   LIPASE  --   --   --   --   --   --  15  --   --   --   --     < > = values in this interval not displayed.       I reviewed the patient's new imaging results.    All laboratory data reviewed  All imaging studies reviewed by me.    MARY Askew,  3/29/2025  [facility]  Office : [phone]  Cell: [phone] (Dr. Servin)

## 2025-03-30 LAB
ANION GAP SERPL CALCULATED.3IONS-SCNC: 12 MMOL/L (ref 7–15)
BACTERIA BLD CULT: ABNORMAL
BUN SERPL-MCNC: 55.7 MG/DL (ref 8–23)
CALCIUM SERPL-MCNC: 9.9 MG/DL (ref 8.8–10.4)
CHLORIDE SERPL-SCNC: 100 MMOL/L (ref 98–107)
CREAT SERPL-MCNC: 3.6 MG/DL (ref 0.51–0.95)
EGFRCR SERPLBLD CKD-EPI 2021: 12 ML/MIN/1.73M2
ERYTHROCYTE [DISTWIDTH] IN BLOOD BY AUTOMATED COUNT: 15.1 % (ref 10–15)
GLUCOSE BLDC GLUCOMTR-MCNC: 138 MG/DL (ref 70–99)
GLUCOSE BLDC GLUCOMTR-MCNC: 142 MG/DL (ref 70–99)
GLUCOSE BLDC GLUCOMTR-MCNC: 151 MG/DL (ref 70–99)
GLUCOSE BLDC GLUCOMTR-MCNC: 156 MG/DL (ref 70–99)
GLUCOSE BLDC GLUCOMTR-MCNC: 177 MG/DL (ref 70–99)
GLUCOSE SERPL-MCNC: 140 MG/DL (ref 70–99)
HCO3 SERPL-SCNC: 24 MMOL/L (ref 22–29)
HCT VFR BLD AUTO: 23.9 % (ref 35–47)
HGB BLD-MCNC: 7.6 G/DL (ref 11.7–15.7)
MCH RBC QN AUTO: 30.4 PG (ref 26.5–33)
MCHC RBC AUTO-ENTMCNC: 31.8 G/DL (ref 31.5–36.5)
MCV RBC AUTO: 96 FL (ref 78–100)
PLATELET # BLD AUTO: 189 10E3/UL (ref 150–450)
POTASSIUM SERPL-SCNC: 3.6 MMOL/L (ref 3.4–5.3)
RBC # BLD AUTO: 2.5 10E6/UL (ref 3.8–5.2)
SODIUM SERPL-SCNC: 136 MMOL/L (ref 135–145)
UPPER GI ENDOSCOPY: NORMAL
WBC # BLD AUTO: 7.3 10E3/UL (ref 4–11)

## 2025-03-30 PROCEDURE — 87040 BLOOD CULTURE FOR BACTERIA: CPT | Performed by: STUDENT IN AN ORGANIZED HEALTH CARE EDUCATION/TRAINING PROGRAM

## 2025-03-30 PROCEDURE — 0DJ08ZZ INSPECTION OF UPPER INTESTINAL TRACT, VIA NATURAL OR ARTIFICIAL OPENING ENDOSCOPIC: ICD-10-PCS | Performed by: INTERNAL MEDICINE

## 2025-03-30 PROCEDURE — 250N000013 HC RX MED GY IP 250 OP 250 PS 637: Performed by: STUDENT IN AN ORGANIZED HEALTH CARE EDUCATION/TRAINING PROGRAM

## 2025-03-30 PROCEDURE — 999N000147 HC STATISTIC PT IP EVAL DEFER

## 2025-03-30 PROCEDURE — 36415 COLL VENOUS BLD VENIPUNCTURE: CPT | Performed by: STUDENT IN AN ORGANIZED HEALTH CARE EDUCATION/TRAINING PROGRAM

## 2025-03-30 PROCEDURE — 120N000001 HC R&B MED SURG/OB

## 2025-03-30 PROCEDURE — 250N000009 HC RX 250: Performed by: INTERNAL MEDICINE

## 2025-03-30 PROCEDURE — 99232 SBSQ HOSP IP/OBS MODERATE 35: CPT | Performed by: STUDENT IN AN ORGANIZED HEALTH CARE EDUCATION/TRAINING PROGRAM

## 2025-03-30 PROCEDURE — 85018 HEMOGLOBIN: CPT | Performed by: HOSPITALIST

## 2025-03-30 PROCEDURE — 85610 PROTHROMBIN TIME: CPT | Performed by: HOSPITALIST

## 2025-03-30 PROCEDURE — 99232 SBSQ HOSP IP/OBS MODERATE 35: CPT | Performed by: INTERNAL MEDICINE

## 2025-03-30 PROCEDURE — 85027 COMPLETE CBC AUTOMATED: CPT | Performed by: STUDENT IN AN ORGANIZED HEALTH CARE EDUCATION/TRAINING PROGRAM

## 2025-03-30 PROCEDURE — 43235 EGD DIAGNOSTIC BRUSH WASH: CPT | Performed by: INTERNAL MEDICINE

## 2025-03-30 PROCEDURE — 80048 BASIC METABOLIC PNL TOTAL CA: CPT | Performed by: STUDENT IN AN ORGANIZED HEALTH CARE EDUCATION/TRAINING PROGRAM

## 2025-03-30 PROCEDURE — 250N000012 HC RX MED GY IP 250 OP 636 PS 637: Performed by: STUDENT IN AN ORGANIZED HEALTH CARE EDUCATION/TRAINING PROGRAM

## 2025-03-30 PROCEDURE — 250N000011 HC RX IP 250 OP 636: Performed by: INTERNAL MEDICINE

## 2025-03-30 PROCEDURE — 250N000013 HC RX MED GY IP 250 OP 250 PS 637: Performed by: HOSPITALIST

## 2025-03-30 PROCEDURE — 36415 COLL VENOUS BLD VENIPUNCTURE: CPT | Performed by: HOSPITALIST

## 2025-03-30 PROCEDURE — 999N000099 HC STATISTIC MODERATE SEDATION < 10 MIN: Performed by: INTERNAL MEDICINE

## 2025-03-30 RX ORDER — CETIRIZINE HYDROCHLORIDE 10 MG/1
10 TABLET ORAL AT BEDTIME
Status: DISCONTINUED | OUTPATIENT
Start: 2025-03-30 | End: 2025-04-17 | Stop reason: HOSPADM

## 2025-03-30 RX ORDER — CHOLECALCIFEROL (VITAMIN D3) 50 MCG
50 TABLET ORAL DAILY
Status: DISCONTINUED | OUTPATIENT
Start: 2025-03-30 | End: 2025-04-16

## 2025-03-30 RX ORDER — POLYETHYLENE GLYCOL 3350 17 G/17G
17 POWDER, FOR SOLUTION ORAL 2 TIMES DAILY
Status: DISCONTINUED | OUTPATIENT
Start: 2025-03-30 | End: 2025-04-17 | Stop reason: HOSPADM

## 2025-03-30 RX ORDER — LIDOCAINE 40 MG/G
CREAM TOPICAL
Status: DISCONTINUED | OUTPATIENT
Start: 2025-03-30 | End: 2025-03-30 | Stop reason: HOSPADM

## 2025-03-30 RX ORDER — FLUMAZENIL 0.1 MG/ML
0.2 INJECTION, SOLUTION INTRAVENOUS
Status: ACTIVE | OUTPATIENT
Start: 2025-03-30 | End: 2025-03-31

## 2025-03-30 RX ORDER — FENTANYL CITRATE 50 UG/ML
INJECTION, SOLUTION INTRAMUSCULAR; INTRAVENOUS PRN
Status: DISCONTINUED | OUTPATIENT
Start: 2025-03-30 | End: 2025-03-30 | Stop reason: HOSPADM

## 2025-03-30 RX ADMIN — AMIODARONE HYDROCHLORIDE 200 MG: 200 TABLET ORAL at 08:50

## 2025-03-30 RX ADMIN — APIXABAN 5 MG: 5 TABLET, FILM COATED ORAL at 21:06

## 2025-03-30 RX ADMIN — AMIODARONE HYDROCHLORIDE 200 MG: 200 TABLET ORAL at 21:06

## 2025-03-30 RX ADMIN — SEVELAMER CARBONATE 1600 MG: 800 TABLET, FILM COATED ORAL at 13:52

## 2025-03-30 RX ADMIN — INSULIN ASPART 1 UNITS: 100 INJECTION, SOLUTION INTRAVENOUS; SUBCUTANEOUS at 13:52

## 2025-03-30 RX ADMIN — SERTRALINE HYDROCHLORIDE 75 MG: 50 TABLET ORAL at 08:53

## 2025-03-30 RX ADMIN — INSULIN GLARGINE 12 UNITS: 100 INJECTION, SOLUTION SUBCUTANEOUS at 13:52

## 2025-03-30 RX ADMIN — Medication 40 MG: at 06:30

## 2025-03-30 RX ADMIN — CETIRIZINE HYDROCHLORIDE 10 MG: 10 TABLET, FILM COATED ORAL at 22:17

## 2025-03-30 RX ADMIN — INSULIN ASPART 1 UNITS: 100 INJECTION, SOLUTION INTRAVENOUS; SUBCUTANEOUS at 18:11

## 2025-03-30 RX ADMIN — ATORVASTATIN CALCIUM 20 MG: 20 TABLET, FILM COATED ORAL at 21:06

## 2025-03-30 RX ADMIN — APIXABAN 5 MG: 5 TABLET, FILM COATED ORAL at 11:39

## 2025-03-30 RX ADMIN — INSULIN GLARGINE 12 UNITS: 100 INJECTION, SOLUTION SUBCUTANEOUS at 21:07

## 2025-03-30 RX ADMIN — Medication 60 ML: at 12:17

## 2025-03-30 RX ADMIN — HYDRALAZINE HYDROCHLORIDE 75 MG: 25 TABLET ORAL at 21:06

## 2025-03-30 RX ADMIN — Medication 50 MCG: at 11:39

## 2025-03-30 RX ADMIN — HYDRALAZINE HYDROCHLORIDE 75 MG: 25 TABLET ORAL at 06:30

## 2025-03-30 RX ADMIN — SEVELAMER CARBONATE 1600 MG: 800 TABLET, FILM COATED ORAL at 18:11

## 2025-03-30 RX ADMIN — AMLODIPINE BESYLATE 10 MG: 10 TABLET ORAL at 08:50

## 2025-03-30 ASSESSMENT — ACTIVITIES OF DAILY LIVING (ADL)
ADLS_ACUITY_SCORE: 78
ADLS_ACUITY_SCORE: 78
ADLS_ACUITY_SCORE: 77
ADLS_ACUITY_SCORE: 78
ADLS_ACUITY_SCORE: 78
ADLS_ACUITY_SCORE: 77
ADLS_ACUITY_SCORE: 78
ADLS_ACUITY_SCORE: 77
ADLS_ACUITY_SCORE: 78
ADLS_ACUITY_SCORE: 77
ADLS_ACUITY_SCORE: 78
ADLS_ACUITY_SCORE: 77
ADLS_ACUITY_SCORE: 77
ADLS_ACUITY_SCORE: 78
ADLS_ACUITY_SCORE: 77
ADLS_ACUITY_SCORE: 77
ADLS_ACUITY_SCORE: 78
ADLS_ACUITY_SCORE: 77

## 2025-03-30 NOTE — PROGRESS NOTES
Nephrology progress note    Pt down for GI procedure, not seen.  Chart revieweed    Weight yesterday 88.9kg, is below target weight but hypertensive. Ongoing weight loss, will challenge slightly and will need new target weight order on discharge.     Dialysis ordered placed for tomorrow per chronic MWF schedule  3.5 hrs via left AVF  1-2kg UF as tolerated

## 2025-03-30 NOTE — PROGRESS NOTES
[id number]    PRIMARY Concern: Abd pain/ Gen. Weakness  SAFETY RISK Concerns (fall risk, behaviors, etc.): Fall risk  Aggression Tool Color: Green  Isolation/Type: Enteric to r/o  Tests/Procedures for NEXT shift: For XR Esophagram today, Echo, pending stool sample  Consults? (Pending/following, signed-off?) GI, WOC, PT, ID  Where is patient from? (Home, TCU, etc.): TCU [facility]  Other Important info for NEXT shift:  Has L arm fistula, L AKA, HD schedule MWF  - Pt to notify staff when ready for CPAP  Anticipated DC date & active delays: TBD    SUMMARY NOTE:  Orientation/Cognitive: A&Ox2-3- disoriented to time and situation  Observation Goals (Met/ Not Met): INP  Mobility Level/Assist Equipment: A2 Lift  Antibiotics & Plan (IV/po, length of tx left): Vanco IV  Pain Management: Denies  Complete Pain Reassessment: Y/N Y Due next: next shift  Tele/VS/O2: VSS on 1L NC, Tele: SR  ABNL Lab/BG: Na, 134, Crea 3.60, Trop 85, WBC 21.3, Hgb 8.1  Diet: NPO Midnight  Bowel/Bladder: incont  Skin Concerns: Left buttocks open wound- wound care applied  Drains/Devices: PIVSL/ O2 support  Patient Stated Goal for Today: none

## 2025-03-30 NOTE — PLAN OF CARE
3/29/25  [id number]    Orientation: A/Ox4, forgetful at times    Vitals/Tele:  VSS, on 1 L nasal cannula    IV Access/drains: PIV SL    Diet: NPO    Mobility: Ax2 w/lift    GI/: Incontinent    Wound/Skin: Left buttocks open wound    Consults: WOC, PT, IP, GI    Discharge Plan: Pending    Other: EGD and XR Esophagram to be completed today 3/30. Pt refused CPAP, does not like the mask. Resp offered her a different mask she still refused. Wore NC on 1 L during the night. Pt refused repositioning and brief check this shift.       See Flow sheets for assessmen

## 2025-03-30 NOTE — PROGRESS NOTES
PRIMARY Concern: Abd pain/ Gen. Weakness  SAFETY RISK Concerns (fall risk, behaviors, etc.): Fall risk  Aggression Tool Color: Green  Isolation/Type: Enteric to r/o  Tests/Procedures for NEXT shift: None  Consults? (Pending/following, signed-off?) GI, WOC, PT, ID  Where is patient from? (Home, TCU, etc.): TCU [facility]  Other Important info for NEXT shift:  Has L arm fistula, L AKA, HD schedule MWF  Anticipated DC date & active delays: TBD     SUMMARY NOTE:  Orientation/Cognitive: A&Ox3- disoriented to time  Observation Goals (Met/ Not Met): INP  Mobility Level/Assist Equipment: A2 Lift  Antibiotics & Plan (IV/po, length of tx left): Vanco IV for Bacteremia.   Pain Management: Denies  Complete Pain Reassessment: Y/N Y Due next: next shift  Tele/VS/O2: VSS on RA.Tele: SR  ABNL Lab/BG: Cr 3.60, Trop 85,Hgb trending down 7.6,   Diet: Renal and TF as scheduled.   Bowel/Bladder: Incont.    Skin Concerns: Left buttocks open wound- wound care per POC. Constantly refusing to Turn & Repo q2hr/PRN. Education provided w/ pt's understanding, however pt continues to refuse. Continue to educate.   Drains/Devices: PIV SL.   Patient Stated Goal for Today: Rest.

## 2025-03-30 NOTE — PROGRESS NOTES
Tried to help place patient on NOC CPAP. She stated she wore a different mask and would not wear the one currently on machine. She stated she wears a mask that covers mouth and nose. She was offered a UTN mask but refused and stated she would wear the NC tonight as she can't wear the mask in room.

## 2025-03-30 NOTE — PROGRESS NOTES
"[facility]    Medicine Progress Note - Hospitalist Service    Date of Admission:  3/27/2025    Assessment & Plan      Supriya Herr is a 75 year old female with very complex past medical history including end-stage renal disease on hemodialysis, HFpEF, paroxysmal atrial fibrillation, hypertension, hyperlipidemia, anxiety, depression, chronic anemia, dysphagia status post PEG tube, type 2 diabetes, left traumatic AKA and recent hospitalization with multifactorial respiratory failure now being admitted on 3/27/2025 with abdominal pain and retching. She is found to have e/o constipation. But other evaluation notable for elevated WBC, procal and CRP of unclear etiology.     Pf note, pt with multiple recent hospitalizations. Per last discharge summary:   \"Hospitalized at [facility] from 3/2 - 3/6/2025 with shortness of breath likely multifactorial.  Volume status was difficult to determine, underwent right heart cath on 3/5 showed mildly elevated right atrial and PCWP readings.  Was treated for acute respiratory failure likely from heart failure exacerbation, atrial fibrillation, end-stage renal disease and discharged to care facility with supplemental nocturnal oxygen.\"  \"Prolonged hospitalized at [facility] from 1/8 to 2/26/2025 for acute hypoxic respiratory failure multifactorial, was intubated ( 1/9-1/18), reintubated (1/20 - 1/25).  Then was treated for shock with pressors, influenza A pneumonia, hospital-acquired pneumonia with intravenous antibiotics, steroids, antivirals.  Then also noted to have heart failure exacerbation, recurrent A-fib with RVR, subsequent bradycardia.  Then also noted to have encephalopathy likely from infectious, metabolic etiology and delirium.  During that hospitalization was noted to have dysphagia, pharyngeal edema, was evaluated by ENT on 2/16.  No findings to explain dysphagia.  G-tube was placed by IR on 2/17 and was on nocturnal tube feeds.  Discharged to TCU for " "rehabilitation.\"    Abdominal pain  Retching, with Hx of GERD   Dysphagia s/p PEG tube (2/1/2025)   Constipation, rectal stool ball   Pt presents to the ED with vague complaints of retching/dry heaving without vomiting and some crampy lower abdominal pain.   * CT scan showed large rectal stool ball without evidence of obstruction. There was also some non-specific mild urinary bladder wall thickening. No other intra-abdominal pathology.   - Unclear exactly what is going on. Pt did not have any retching or other symptoms while I was in the room. Perhaps she has some reflux of tube feeds or other esophageal issue.  - GI consulted for assessment:   - EGD on 3/30 without etiology to explain dysphagia   - XR Esophagram pending  - Protonix 40 mg daily  - Will order bowel regimen to work on the constipation   - Calorie counts so we can try to wean off tube feeds if able  - Zofran available PRN    Leukocytosis  Elevated procalcitonin   Elevated CRP  E. Faecalis bacteremia  WBC elevated to 17.4, Procal elevated to 1.3, and CRP elevated to 39.78.   Unclear infectious source. She has some mild non-specific urinary bladder wall thickening, but with ESRD makes very little urine. Also has a new MIKAYLA 3mm nodule which may be infectious or inflammatory, but no respiratory complaints.   * COVID, flu RSV pending   *UA very abnormal but difficult to interpret in the setting of ESRD. Ucx positive for E faecalis. Could be initial source or seeding from bacteremia.  * Bcx positive for E. Faecalis. Unclear source. Concern for endocarditis given how quickly blood cultures are coming back positive  *TTE without vegetation seen.  - ID consulted for assistance   - SHAHNAZ (ordered for tomorrow, 3/31)   - IV vancomycin   - Repeat Bcx until cleared    End-stage renal disease on HD   Dialyzes MWF via L arm fistula. Per prior notes, volume removal has often been limited by intra dialytic hypotension and a fib with RVR   *Noted that patient has been " getting short of breath on Sundays in the past and there was discussion of adding a fourth dialysis day on Saturdays. As of now, nephrology challenging dry weight to see if that helps.  - Nephrology consulted   - Continue PTA midodrine MWF before dialysis   - Continue PTA calcitriol, renvela, vit D.     Heart failure with preserved ejection fraction  Markedly elevated BNP without clinical features of exacerbation   Paroxysmal atrial fibrillation on chronic anticoagulation  Hypertension  Hyperlipidemia  Troponin elevation, chronic (85->85)  [PTA medications include: amiodarone 200mg BID, amlodipine 10mg daily, Apixaban 5mg BID, atorvastatin 20mg daily, hydralazine 75mg Q8H,   *TTE 1/21/2025 with LVEF of 60%.  No valvular abnormality   * RHC 3/5/25 showing moderately elevated right and left-sided filling pressure; elevated wedge pressure consistent with pulmonary hypertension  * On admission now, pt has markedly elevated BNP to 51,544 (though historically is in the 20,000-40,000 range), She does have small pleural effusions on CT, but no other overt evidence of volume overload or clinical HF. She is satting well on room air.  - I/Os and daily weights ordered  - continue PTA apixaban 5 mg BID  - continue PTA atorvastatin 20 mg daily  - continue PTA hydralazine 75 mg q8H  - continue PTA amiodarone 200 mg BID  - continue PTA amlodipine 10 m qday  - Volume management per nephrology  - Avoid using supplemental oxygen unless patient is satting <90% on room air.    Anxiety/depression/insomnia  - Continue PTA zoloft     Anemia of chronic disease  Hemoglobin 9.2 on admission, stable   - Monitor CBC       Type II DM  - Continue PTA lantus 15 units BID   - MDSSI     Physical deconditioning from medical illness, senile frailty.  S/p left above-knee amputation, traumatic  Patient from TCU, daughter hopeful pt will be able to discharge back home with services rather than going back to TCU.   - PT/OT consulted      Sacrococcygeal  "Pressure injury, Stage 3, present on admission  Wound care team followed.  Pressure injury prevention.      Obesity with a BMI of 34.  Increase all-cause mortality and morbidity.          Diet: Adult Formula Drip Feeding: Continuous [place] Renal Support; Gastrostomy; Goal Rate: 65; mL/hr; From: 4:00 PM; To: 6:00 AM; Resume nocturnal TF tonight at 4:00 pm -- Run at 65 mL/hr daily from 4:00 pm to 6:00 am  Advance Diet as Tolerated: Clear Liquid Diet  NPO for Procedure/Surgery per Anesthesia Guidelines Except for: Meds; Clear liquids before procedure/surgery: ADULT (Age GREATER than or Equal to 18 years) - Clear liquids 2 hours before procedure/surgery  Calorie Counts    DVT Prophylaxis: DOAC  Bradshaw Catheter: Not present  Lines: None     Cardiac Monitoring: None  Code Status: No CPR- Pre-arrest intubation OK      Clinically Significant Risk Factors          # Hypochloremia: Lowest Cl = 97 mmol/L in last 2 days, will monitor as appropriate   # Hypercalcemia: corrected calcium is >10.1, will monitor as appropriate    # Hypoalbuminemia: Lowest albumin = 3.2 g/dL at 3/29/2025 11:39 AM, will monitor as appropriate       # Hypertension: Noted on problem list  # Chronic heart failure with preserved ejection fraction: heart failure noted on problem list and last echo with EF >50%          # DMII: A1C = 6.5 % (Ref range: <5.7 %) within past 6 months   # Obesity: Estimated body mass index is 31.63 kg/m  as calculated from the following:    Height as of this encounter: 1.676 m (5' 6\").    Weight as of this encounter: 88.9 kg (195 lb 15.8 oz)., PRESENT ON ADMISSION       # Financial/Environmental Concerns:           Social Drivers of Health    Tobacco Use: Medium Risk (3/30/2025)    Patient History     Smoking Tobacco Use: Former     Smokeless Tobacco Use: Never   Physical Activity: Insufficiently Active (7/19/2024)    Exercise Vital Sign     Days of Exercise per Week: 1 day     Minutes of Exercise per Session: 10 min   Social " Connections: Unknown (7/19/2024)    Social Connection and Isolation Panel [NHANES]     Frequency of Social Gatherings with Friends and Family: More than three times a week          Disposition Plan     Medically Ready for Discharge: Anticipated in 2-4 Days pending evaluation of bacteremia             Ganesh Lawson MD  Hospitalist Service  [facility]  Securely message with [employer] (more info)  Text page via [employer] Paging/Directory   ______________________________________________________________________    Interval History   TTE without vegetation seen. Discussed need for SHAHNAZ with patient and daughter.     No etiology of dysphagia seen on EGD with Dr. Servin. Esophagram pending to be done tomorrow likely.    Patient has no other complaints or concerns at this time. Daughter mentioned she has been getting short of breath on Sundays and there has been talk of doing another run of dialysis on Saturdays. At this time, patient feels her breathing is good and improved compared to where she normally is on Sundays.    Declined CPAP yesterday due to mask fit issues. Discussed importance of CPAP usage in JONE.       Physical Exam   Vital Signs: Temp: 98.2  F (36.8  C) Temp src: Oral BP: (!) 121/38 Pulse: 61   Resp: 20 SpO2: 93 % O2 Device: None (Room air) Oxygen Delivery: 3 LPM  Weight: 195 lbs 15.82 oz  Constitutional: Awake, alert, cooperative, no apparent distress.  Appears chronically ill.  Pulmonary: No increased work of breathing, good air exchange, clear to auscultation bilaterally, no crackles or wheezing.  Cardiovascular: Regular rate and rhythm, normal S1 and S2, no S3 or S4. No murmurs, rubs, or gallops noted.  GI: Normal bowel sounds, soft, non-distended, mild tenderness of abdomen generally.  PEG tube in place. No guarding or rebound.  Skin/Integumen: No cyanosis or jaundice. No rashes noted.  Extremities: No lower extremity edema.  Neuro: A&Ox4. Conversant. Responds appropriately in  conversation. Moves all extremities with no focal deficit identified.      Medical Decision Making     41 MINUTES SPENT BY ME on the date of service doing chart review, history, exam, documentation & further activities per the note.      Data   ------------------------- PAST 24 HR DATA REVIEWED -----------------------------------------------    I have personally reviewed the following data over the past 24 hrs:    7.3  \   7.6 (L)   / 189     136 100 55.7 (H) /  151 (H)   3.6 24 3.60 (H) \       Imaging results reviewed over the past 24 hrs:   No results found for this or any previous visit (from the past 24 hours).

## 2025-03-30 NOTE — PLAN OF CARE
PT eval orders received, pt admitted from TCU, has bed hold, plan for return to TCU at discharge. A x 2 w/ lift per rounds this AM. No skilled IP PT needs as safe discharge displacement identified. Defer PT to next level of care, will complete orders

## 2025-03-30 NOTE — PROGRESS NOTES
[facility]  Infectious Disease Progress Note          Assessment and Plan:   Date of Admission:  3/27/2025  Date of Consult (When I saw the patient): 03/28/25        Assessment & Plan  Supriya Herr is a 75 year old female who was admitted on 3/27/2025.      Impression:  74 yo female with a history of ESRD on HD, CHF, DM, HTN, JONE, traumatic left AKA, and obesity who presented with abdominal pain and dry heaves and has since been found to have E. Faecalis bacteremia and urine culture also growing E. Faecalis.      -Afebrile.   -Leukocytosis.   -Blood cultures from 3/27: 4 of 4 with E. Faecalis by verigene testing.   -Urine culture with 50-100K E. Faecalis. Produces very little urine with ESRD. No urinary symptoms.   -CT C/A/P with mild bladder wall thickening, rectal stool ball, no abscess.   -On Meropenem  -Allergy listed to Unasyn and Penicillin (rash).      Recommendations:   High-grade Enterococcus bacteremia, blood cultures all rapidly positive even after start of antibiotics, high probability this is endocarditis  Urine may either be source initially or spillover, imaging without obvious abscess, obstruction etc  transthoracic echocardiogram unremarkable but almost certainly will need transesophageal echocardiogram as endovascular infection a major concern  For now vancomycin,.  Possible rechallenge of penicillin allergy, likely addition of ceftriaxone synergistic treatment.  The transesophageal echocardiogram almost certainly be needed here as the treatment will be dramatically different between endocarditis and simple bacteremia           Interval History:     Feels okay generalized achiness and malaise, cognition okay, temp is down no major fever symptoms.  No focal pain sites.  Follow-up blood cultures from March 28 rapidly positive March 29 and March 30 cultures so far negative transthoracic echocardiogram unremarkable              Medications:     Current Facility-Administered  Medications   Medication Dose Route Frequency Provider Last Rate Last Admin    - MEDICATION INSTRUCTIONS for Dialysis Patients -   Does not apply See Admin Instructions Ekaterina Gayle MD        amiodarone (PACERONE) tablet 200 mg  200 mg Oral or Feeding Tube BID Ekaterina Gayle MD   200 mg at 03/30/25 0850    amLODIPine (NORVASC) tablet 10 mg  10 mg Oral or Feeding Tube Once per day on Sunday Tuesday Thursday Saturday Ekaterina Gayle MD   10 mg at 03/30/25 0850    apixaban ANTICOAGULANT (ELIQUIS) tablet 5 mg  5 mg Oral or Feeding Tube BID Ekaterina Gayle MD   5 mg at 03/29/25 2027    atorvastatin (LIPITOR) tablet 20 mg  20 mg Oral or Feeding Tube QPM Ekaterina Gayle MD   20 mg at 03/29/25 2027    calcitRIOL (ROCALTROL) capsule 0.25 mcg  0.25 mcg Oral Once per day on Monday Wednesday Friday Ekaterina Gayle MD   0.25 mcg at 03/28/25 1826    cetirizine (zyrTEC) tablet 10 mg  10 mg Oral or Feeding Tube At Bedtime Ekaterina Gayle MD        hydrALAZINE (APRESOLINE) tablet 75 mg  75 mg Oral or Feeding Tube Q8H Ekaterina Gayle MD        insulin aspart (NovoLOG) injection (RAPID ACTING)  1-7 Units Subcutaneous TID AC Ekaterina Gayle MD        insulin aspart (NovoLOG) injection (RAPID ACTING)  1-5 Units Subcutaneous At Bedtime Ekaterina Gayle MD        insulin glargine (LANTUS PEN) injection 12 Units  12 Units Subcutaneous BID Ganesh Lawson MD   12 Units at 03/29/25 2028    midodrine (PROAMATINE) tablet 5 mg  5 mg Oral Once per day on Monday Wednesday Friday Ekaterina Gayle MD   5 mg at 03/28/25 1826    pantoprazole (PROTONIX) 2 mg/mL suspension 40 mg  40 mg Per Feeding Tube QAM AC Ekaterina Gayle MD   40 mg at 03/30/25 0630    polyethylene glycol (MIRALAX) Packet 17 g  17 g Oral or Feeding Tube BID Ekaterina Gayle MD        Prosource TF20 ENfit Compatibl EN LIQD (PROSOURPorous Power TF20) packet 60 mL  1 packet Per Feeding Tube Daily Ekaterina Gayle MD   60 mL at 03/29/25  "1617    sennosides (SENOKOT) tablet 2 tablet  2 tablet Oral BID Ekaterina Gayle MD        sertraline (ZOLOFT) tablet 75 mg  75 mg Oral or Feeding Tube Daily Ekaterina Gayle MD   75 mg at 03/30/25 0853    sevelamer carbonate (RENVELA) tablet 1,600 mg  1,600 mg Oral TID w/meals Ekaterina Gayle MD        sodium chloride (PF) 0.9% PF flush 3 mL  3 mL Intracatheter Q8H VÍCTOR Ekaterina Gayle MD   3 mL at 03/30/25 0849    vancomycin place cardoso - receiving intermittent dosing  1 each Intravenous See Admin Instructions Ekaterina Gayle MD        vitamin D3 (CHOLECALCIFEROL) tablet 50 mcg  50 mcg Oral or Feeding Tube Daily Ekaterina Gayle MD                      Physical Exam:   Blood pressure (!) 154/46, pulse 68, temperature 98.1  F (36.7  C), temperature source Axillary, resp. rate 18, height 1.676 m (5' 6\"), weight 88.9 kg (195 lb 15.8 oz), SpO2 99%, not currently breastfeeding.  Wt Readings from Last 2 Encounters:   03/29/25 88.9 kg (195 lb 15.8 oz)   03/25/25 92.5 kg (204 lb)     Vital Signs with Ranges  Temp:  [98.1  F (36.7  C)-99  F (37.2  C)] 98.1  F (36.7  C)  Pulse:  [68-74] 68  Resp:  [18] 18  BP: (137-156)/(36-49) 154/46  SpO2:  [87 %-99 %] 99 %    Constitutional: Awake, alert, cooperative, no apparent distress     Lungs: Clear to auscultation bilaterally, no crackles or wheezing   Cardiovascular: Regular rate and rhythm, normal S1 and S2, and no murmur noted   Abdomen: Normal bowel sounds, soft, non-distended, non-tender   Skin: No rashes, no cyanosis, no edema   Other: Looks chronically more than acutely ill, no embolic lesions          Data:   All microbiology laboratory data reviewed.  Recent Labs   Lab Test 03/30/25  0616 03/29/25  1139 03/28/25  0320   WBC 7.3 9.9 21.3*   HGB 7.6* 7.9* 8.1*   HCT 23.9* 25.9* 25.4*   MCV 96 97 95    196 197     Recent Labs   Lab Test 03/30/25  0616 03/29/25  1139 03/28/25  0320   CR 3.60* 2.65* 3.60*     Recent Labs   Lab Test 06/03/24  1644 "   *     Recent Labs   Lab Test 03/19/21  0900 06/24/18  1100 06/23/18  2348 06/23/18  2347 06/18/18  0949 05/19/18  0119 05/19/18  0056 05/19/18  0022 11/02/17  0600   CULT Light growth  Normal skin shree    Light growth  Streptococcus agalactiae sero group B  This organism is susceptible to ampicillin, penicillin, vancomycin and the cephalosporins.   If treatment is required AND your patient is allergic to penicillin, contact the   Microbiology Lab within 5 days to request susceptibility testing.  *  Moderate growth  Candida albicans  Susceptibility testing not routinely done  * Moderate growth  Corynebacterium striatum  Identification obtained by MALDI-TOF mass spectrometry research use only database. Test   characteristics determined and verified by the Infectious Diseases Diagnostic Laboratory   ([facility]) [place], [place].  *  Light growth  Staphylococcus aureus  *  Single colony  Beta hemolytic Streptococcus group B  *  Light growth  Gram positive bacilli resembling diphtheroids  No further identification  Susceptibility testing not routinely done  *  Susceptibility testing requested by  Dr. Chen for routine sensitivities on the Corynebacterium striatum on 6.26.18. [place].    Moderate growth  Staphylococcus aureus  Susceptibility testing done on previous specimen  *  Moderate growth  Corynebacterium striatum  Identification obtained by MALDI-TOF mass spectrometry research use only database. Test   characteristics determined and verified by the Infectious Diseases Diagnostic Laboratory   ([facility]) [place], [place].  Susceptibility testing not routinely done  *  Incorrectly ordered by PCU/Clinic  Canceled, Test credited  See Accesion number [id number] No growth No growth Moderate growth  Beta hemolytic Streptococcus group C  *  Moderate growth  Staphylococcus aureus  *  Light growth  Normal skin shree   No growth Moderate growth  Acinetobacter baumannii complex  *  Heavy growth  Beta hemolytic Streptococcus  group C  *  Moderate growth  Escherichia coli  *  Moderate growth  Beta hemolytic Streptococcus group B  * No growth No growth

## 2025-03-30 NOTE — PLAN OF CARE
Goal Outcome Evaluation:    SUMMARY NOTE:  Orientation/Cognitive: A&Ox2-3; intermittent confusion at baseline  Observation Goals (Met/ Not Met): INP  Mobility Level/Assist Equipment: A2 Lift, turn & repo q2  Antibiotics & Plan (IV/po, length of tx left): IV shi, TF restarted  Pain Management: Denies  Tele/VS/O2: VSS except on 1L O2 via NC, cpap during NOC; patient will often refuse cpap  ABNL Lab/BG: hgb 7.9, , blood cultures redrawn today; pending  Diet: TF started today, renal diet, tolerating PO diet also, refuses oral meds, meds thru g-tube; plan for NPO @ [place], stop TF & NPO. BG AC & HS.   Bowel/Bladder: minimal urine, HD M,W,F, no BM during shift, passing flatus  Skin Concerns: Left buttocks wound, barrier cream applied, redness to folds  Drains/Devices: PIVSL/ O2 support  Patient Stated Goal for Today: none  Other: Plan for NPO @ [place] for EGD 3/30, awaiting xray esophogram

## 2025-03-31 ENCOUNTER — APPOINTMENT (OUTPATIENT)
Dept: GENERAL RADIOLOGY | Facility: CLINIC | Age: 75
End: 2025-03-31
Attending: PHYSICIAN ASSISTANT
Payer: COMMERCIAL

## 2025-03-31 ENCOUNTER — MEDICAL CORRESPONDENCE (OUTPATIENT)
Dept: HEALTH INFORMATION MANAGEMENT | Facility: CLINIC | Age: 75
End: 2025-03-31

## 2025-03-31 LAB
ANION GAP SERPL CALCULATED.3IONS-SCNC: 13 MMOL/L (ref 7–15)
BACTERIA BLD CULT: ABNORMAL
BUN SERPL-MCNC: 81.4 MG/DL (ref 8–23)
CALCIUM SERPL-MCNC: 10.2 MG/DL (ref 8.8–10.4)
CHLORIDE SERPL-SCNC: 96 MMOL/L (ref 98–107)
CREAT SERPL-MCNC: 5 MG/DL (ref 0.51–0.95)
EGFRCR SERPLBLD CKD-EPI 2021: 8 ML/MIN/1.73M2
ERYTHROCYTE [DISTWIDTH] IN BLOOD BY AUTOMATED COUNT: 15.2 % (ref 10–15)
GLUCOSE BLDC GLUCOMTR-MCNC: 109 MG/DL (ref 70–99)
GLUCOSE BLDC GLUCOMTR-MCNC: 121 MG/DL (ref 70–99)
GLUCOSE BLDC GLUCOMTR-MCNC: 161 MG/DL (ref 70–99)
GLUCOSE BLDC GLUCOMTR-MCNC: 186 MG/DL (ref 70–99)
GLUCOSE BLDC GLUCOMTR-MCNC: 85 MG/DL (ref 70–99)
GLUCOSE BLDC GLUCOMTR-MCNC: 90 MG/DL (ref 70–99)
GLUCOSE SERPL-MCNC: 133 MG/DL (ref 70–99)
HCO3 SERPL-SCNC: 27 MMOL/L (ref 22–29)
HCT VFR BLD AUTO: 25.8 % (ref 35–47)
HGB BLD-MCNC: 7.6 G/DL (ref 11.7–15.7)
HGB BLD-MCNC: 8.3 G/DL (ref 11.7–15.7)
INR PPP: 1.47 (ref 0.85–1.15)
MAGNESIUM SERPL-MCNC: 2.3 MG/DL (ref 1.7–2.3)
MCH RBC QN AUTO: 30.2 PG (ref 26.5–33)
MCHC RBC AUTO-ENTMCNC: 32.2 G/DL (ref 31.5–36.5)
MCV RBC AUTO: 94 FL (ref 78–100)
PHOSPHATE SERPL-MCNC: 3.7 MG/DL (ref 2.5–4.5)
PLATELET # BLD AUTO: 209 10E3/UL (ref 150–450)
POTASSIUM SERPL-SCNC: 3.7 MMOL/L (ref 3.4–5.3)
RBC # BLD AUTO: 2.75 10E6/UL (ref 3.8–5.2)
SODIUM SERPL-SCNC: 136 MMOL/L (ref 135–145)
VANCOMYCIN SERPL-MCNC: 15.7 UG/ML
WBC # BLD AUTO: 7.9 10E3/UL (ref 4–11)

## 2025-03-31 PROCEDURE — 90937 HEMODIALYSIS REPEATED EVAL: CPT

## 2025-03-31 PROCEDURE — 84100 ASSAY OF PHOSPHORUS: CPT | Performed by: STUDENT IN AN ORGANIZED HEALTH CARE EDUCATION/TRAINING PROGRAM

## 2025-03-31 PROCEDURE — 36415 COLL VENOUS BLD VENIPUNCTURE: CPT | Performed by: STUDENT IN AN ORGANIZED HEALTH CARE EDUCATION/TRAINING PROGRAM

## 2025-03-31 PROCEDURE — 99233 SBSQ HOSP IP/OBS HIGH 50: CPT | Performed by: STUDENT IN AN ORGANIZED HEALTH CARE EDUCATION/TRAINING PROGRAM

## 2025-03-31 PROCEDURE — 999N000198 HC STATISTIC WOC PT EDUCATION, 16-30 MIN

## 2025-03-31 PROCEDURE — 80048 BASIC METABOLIC PNL TOTAL CA: CPT | Performed by: STUDENT IN AN ORGANIZED HEALTH CARE EDUCATION/TRAINING PROGRAM

## 2025-03-31 PROCEDURE — 250N000011 HC RX IP 250 OP 636: Performed by: STUDENT IN AN ORGANIZED HEALTH CARE EDUCATION/TRAINING PROGRAM

## 2025-03-31 PROCEDURE — 90935 HEMODIALYSIS ONE EVALUATION: CPT | Performed by: INTERNAL MEDICINE

## 2025-03-31 PROCEDURE — 258N000003 HC RX IP 258 OP 636: Performed by: INTERNAL MEDICINE

## 2025-03-31 PROCEDURE — 999N000157 HC STATISTIC RCP TIME EA 10 MIN

## 2025-03-31 PROCEDURE — 120N000001 HC R&B MED SURG/OB

## 2025-03-31 PROCEDURE — 250N000013 HC RX MED GY IP 250 OP 250 PS 637: Performed by: STUDENT IN AN ORGANIZED HEALTH CARE EDUCATION/TRAINING PROGRAM

## 2025-03-31 PROCEDURE — 94660 CPAP INITIATION&MGMT: CPT

## 2025-03-31 PROCEDURE — 74220 X-RAY XM ESOPHAGUS 1CNTRST: CPT

## 2025-03-31 PROCEDURE — 85027 COMPLETE CBC AUTOMATED: CPT | Performed by: STUDENT IN AN ORGANIZED HEALTH CARE EDUCATION/TRAINING PROGRAM

## 2025-03-31 PROCEDURE — 80202 ASSAY OF VANCOMYCIN: CPT | Performed by: STUDENT IN AN ORGANIZED HEALTH CARE EDUCATION/TRAINING PROGRAM

## 2025-03-31 PROCEDURE — 83735 ASSAY OF MAGNESIUM: CPT | Performed by: STUDENT IN AN ORGANIZED HEALTH CARE EDUCATION/TRAINING PROGRAM

## 2025-03-31 PROCEDURE — 258N000003 HC RX IP 258 OP 636: Performed by: STUDENT IN AN ORGANIZED HEALTH CARE EDUCATION/TRAINING PROGRAM

## 2025-03-31 RX ADMIN — HYDRALAZINE HYDROCHLORIDE 75 MG: 25 TABLET ORAL at 14:22

## 2025-03-31 RX ADMIN — Medication: at 09:14

## 2025-03-31 RX ADMIN — CALCITRIOL CAPSULES 0.25 MCG 0.25 MCG: 0.25 CAPSULE ORAL at 14:04

## 2025-03-31 RX ADMIN — MIDODRINE HYDROCHLORIDE 5 MG: 5 TABLET ORAL at 08:02

## 2025-03-31 RX ADMIN — HYDRALAZINE HYDROCHLORIDE 75 MG: 25 TABLET ORAL at 05:18

## 2025-03-31 RX ADMIN — AMIODARONE HYDROCHLORIDE 200 MG: 200 TABLET ORAL at 14:05

## 2025-03-31 RX ADMIN — INSULIN GLARGINE 12 UNITS: 100 INJECTION, SOLUTION SUBCUTANEOUS at 08:00

## 2025-03-31 RX ADMIN — Medication 50 MCG: at 14:06

## 2025-03-31 RX ADMIN — SEVELAMER CARBONATE 1600 MG: 800 TABLET, FILM COATED ORAL at 18:27

## 2025-03-31 RX ADMIN — SERTRALINE HYDROCHLORIDE 75 MG: 50 TABLET ORAL at 14:05

## 2025-03-31 RX ADMIN — SODIUM CHLORIDE 250 ML: 0.9 INJECTION, SOLUTION INTRAVENOUS at 09:14

## 2025-03-31 RX ADMIN — SENNOSIDES 2 TABLET: 8.6 TABLET ORAL at 14:06

## 2025-03-31 RX ADMIN — AMIODARONE HYDROCHLORIDE 200 MG: 200 TABLET ORAL at 22:29

## 2025-03-31 RX ADMIN — ATORVASTATIN CALCIUM 20 MG: 20 TABLET, FILM COATED ORAL at 20:23

## 2025-03-31 RX ADMIN — Medication 60 ML: at 14:07

## 2025-03-31 RX ADMIN — VANCOMYCIN HYDROCHLORIDE 1500 MG: 10 INJECTION, POWDER, LYOPHILIZED, FOR SOLUTION INTRAVENOUS at 14:18

## 2025-03-31 RX ADMIN — SODIUM CHLORIDE 200 ML: 0.9 INJECTION, SOLUTION INTRAVENOUS at 09:14

## 2025-03-31 RX ADMIN — Medication 40 MG: at 14:07

## 2025-03-31 ASSESSMENT — ACTIVITIES OF DAILY LIVING (ADL)
ADLS_ACUITY_SCORE: 82
ADLS_ACUITY_SCORE: 78
ADLS_ACUITY_SCORE: 82
ADLS_ACUITY_SCORE: 82
ADLS_ACUITY_SCORE: 78
ADLS_ACUITY_SCORE: 82
ADLS_ACUITY_SCORE: 82
ADLS_ACUITY_SCORE: 78
ADLS_ACUITY_SCORE: 82
ADLS_ACUITY_SCORE: 78
ADLS_ACUITY_SCORE: 82
ADLS_ACUITY_SCORE: 82

## 2025-03-31 NOTE — PROGRESS NOTES
"[facility]    Medicine Progress Note - Hospitalist Service    Date of Admission:  3/27/2025    Assessment & Plan      Supriya Herr is a 75 year old female with very complex past medical history including end-stage renal disease on hemodialysis, HFpEF, paroxysmal atrial fibrillation, hypertension, hyperlipidemia, anxiety, depression, chronic anemia, dysphagia status post PEG tube, type 2 diabetes, left traumatic AKA and recent hospitalization with multifactorial respiratory failure now being admitted on 3/27/2025 with abdominal pain and retching. She is found to have e/o constipation. But other evaluation notable for elevated WBC, procal and CRP of unclear etiology.     Pf note, pt with multiple recent hospitalizations. Per last discharge summary:   \"Hospitalized at [facility] from 3/2 - 3/6/2025 with shortness of breath likely multifactorial.  Volume status was difficult to determine, underwent right heart cath on 3/5 showed mildly elevated right atrial and PCWP readings.  Was treated for acute respiratory failure likely from heart failure exacerbation, atrial fibrillation, end-stage renal disease and discharged to care facility with supplemental nocturnal oxygen.\"  \"Prolonged hospitalized at [facility] from 1/8 to 2/26/2025 for acute hypoxic respiratory failure multifactorial, was intubated ( 1/9-1/18), reintubated (1/20 - 1/25).  Then was treated for shock with pressors, influenza A pneumonia, hospital-acquired pneumonia with intravenous antibiotics, steroids, antivirals.  Then also noted to have heart failure exacerbation, recurrent A-fib with RVR, subsequent bradycardia.  Then also noted to have encephalopathy likely from infectious, metabolic etiology and delirium.  During that hospitalization was noted to have dysphagia, pharyngeal edema, was evaluated by ENT on 2/16.  No findings to explain dysphagia.  G-tube was placed by IR on 2/17 and was on nocturnal tube feeds.  Discharged to TCU for " "rehabilitation.\"    E. Faecalis bacteremia  Leukocytosis  Elevated procalcitonin   Elevated CRP  On admission, WBC elevated to 17.4, Procal elevated to 1.3, and CRP elevated to 39.78.   Unclear infectious source. She has some mild non-specific urinary bladder wall thickening, but with ESRD makes very little urine. Also has a new MIKAYLA 3mm nodule which may be infectious or inflammatory, but no respiratory complaints.   * COVID, flu RSV pending   *UA very abnormal but difficult to interpret in the setting of ESRD. Ultimately Ucx positive for E faecalis. Could be initial source or seeding from bacteremia.  * Bcx positive for E. Faecalis. Unclear source. Concern for endocarditis given how quickly blood cultures are coming back positive  *TTE without vegetation seen.  - ID consulted for assistance   - SHAHNAZ  planned for 3/31, results pending   - Continue IV vancomycin   - Repeat Bcx until cleared, no growth since 3/29    Abdominal pain  Retching, with Hx of GERD   Dysphagia s/p PEG tube (2/1/2025)   Constipation, rectal stool ball   Pt presents to the ED with vague complaints of retching/dry heaving without vomiting and some crampy lower abdominal pain.   * CT scan showed large rectal stool ball without evidence of obstruction. There was also some non-specific mild urinary bladder wall thickening. No other intra-abdominal pathology.   - Unclear exactly what is going on. Pt did not have any retching or other symptoms while I was in the room. Perhaps she has some reflux of tube feeds or other esophageal issue.  - GI consulted for assessment:   - EGD on 3/30 without etiology to explain dysphagia   - XR Esophagram normal  - Continue Protonix 40 mg daily  - Continue bowel regimen to work on the constipation   - Calorie counts so we can try to wean off tube feeds if able  - Zofran available PRN    End-stage renal disease on HD   Dialyzes MWF via L arm fistula. Per prior notes, volume removal has often been limited by intra " dialytic hypotension and a fib with RVR   *Noted that patient has been getting short of breath on Sundays in the past and there was discussion of adding a fourth dialysis day on Saturdays. As of now, nephrology challenging dry weight to see if that helps.  - Nephrology consulted   - Continue PTA midodrine MWF before dialysis   - Continue PTA calcitriol, renvela, vit D.     Heart failure with preserved ejection fraction  Markedly elevated BNP without clinical features of exacerbation   Paroxysmal atrial fibrillation on chronic anticoagulation  Hypertension  Hyperlipidemia  Troponin elevation, chronic (85->85)  [PTA medications include: amiodarone 200mg BID, amlodipine 10mg daily, Apixaban 5mg BID, atorvastatin 20mg daily, hydralazine 75mg Q8H,   *TTE 1/21/2025 with LVEF of 60%.  No valvular abnormality   * RHC 3/5/25 showing moderately elevated right and left-sided filling pressure; elevated wedge pressure consistent with pulmonary hypertension  * On admission now, pt has markedly elevated BNP to 51,544 (though historically is in the 20,000-40,000 range), She does have small pleural effusions on CT, but no other overt evidence of volume overload or clinical HF. She is satting well on room air.  - I/Os and daily weights ordered  - continue PTA apixaban 5 mg BID  - continue PTA atorvastatin 20 mg daily  - continue PTA hydralazine 75 mg q8H  - continue PTA amiodarone 200 mg BID  - continue PTA amlodipine 10 m qday  - Volume management per nephrology  - Avoid using supplemental oxygen unless patient is satting <90% on room air.    Anxiety/depression/insomnia  - Continue PTA zoloft     Anemia of chronic disease  Hemoglobin 9.2 on admission, stable at 8.3 now  - Monitor CBC       Type II DM  - Continue PTA lantus 15 units BID   - MDSSI     Physical deconditioning from medical illness, senile frailty.  S/p left above-knee amputation, traumatic  Patient from TCU, daughter hopeful pt will be able to discharge back home with  "services rather than going back to TCU.   - PT/OT consulted      Sacrococcygeal Pressure injury, Stage 3, present on admission  Wound care team followed.  Pressure injury prevention.      Obesity with a BMI of 34.  Increase all-cause mortality and morbidity.          Diet: NPO for Procedure/Surgery per Anesthesia Guidelines Except for: Meds; Clear liquids before procedure/surgery: ADULT (Age GREATER than or Equal to 18 years) - Clear liquids 2 hours before procedure/surgery  Calorie Counts  Adult Formula Drip Feeding: Continuous [employer] Renal Support; Gastrostomy; Goal Rate: 65; mL/hr; From: 6:00 PM; To: 6:00 AM; 3/31: Decrease cycle time from 14 hrs to 12 hrs (6:00 pm to 6:00 am)    DVT Prophylaxis: DOAC  Bradshaw Catheter: Not present  Lines: None     Cardiac Monitoring: None  Code Status: No CPR- Pre-arrest intubation OK      Clinically Significant Risk Factors          # Hypochloremia: Lowest Cl = 96 mmol/L in last 2 days, will monitor as appropriate      # Hypoalbuminemia: Lowest albumin = 3.2 g/dL at 3/29/2025 11:39 AM, will monitor as appropriate    # Coagulation Defect: INR = 1.47 (Ref range: 0.85 - 1.15) and/or PTT = 54 Seconds (Ref range: 22 - 38 Seconds), will monitor for bleeding    # Hypertension: Noted on problem list  # Chronic heart failure with preserved ejection fraction: heart failure noted on problem list and last echo with EF >50%          # DMII: A1C = 6.5 % (Ref range: <5.7 %) within past 6 months   # Obesity: Estimated body mass index is 33.45 kg/m  as calculated from the following:    Height as of this encounter: 1.676 m (5' 6\").    Weight as of this encounter: 94 kg (207 lb 3.7 oz)., PRESENT ON ADMISSION       # Financial/Environmental Concerns:           Social Drivers of Health    Tobacco Use: Medium Risk (3/30/2025)    Patient History     Smoking Tobacco Use: Former     Smokeless Tobacco Use: Never   Physical Activity: Insufficiently Active (7/19/2024)    Exercise Vital Sign     Days of " Exercise per Week: 1 day     Minutes of Exercise per Session: 10 min   Social Connections: Unknown (7/19/2024)    Social Connection and Isolation Panel [NHANES]     Frequency of Social Gatherings with Friends and Family: More than three times a week          Disposition Plan     Medically Ready for Discharge: Anticipated in 2-4 Days pending evaluation of bacteremia             Ekaterina Gayle MD  Hospitalist Service  [facility]  Securely message with [employer] (more info)  Text page via [employer] Paging/Directory   ______________________________________________________________________    Interval History   Pt seen on dialysis. No acute concerns. NO CP or shortness of breath. Tolerating dialysis well. Her Retching/dry heaving issue that she presented with has resolved. She really wants to go home.       Physical Exam   Vital Signs: Temp: 98.2  F (36.8  C) Temp src: Oral BP: 107/49 Pulse: 70   Resp: 25 SpO2: 100 % O2 Device: None (Room air) Oxygen Delivery: 1 LPM  Weight: 207 lbs 3.72 oz  Constitutional: Awake, alert, cooperative, no apparent distress.  Appears chronically ill.  Pulmonary: No increased work of breathing, good air exchange, clear to auscultation bilaterally, no crackles or wheezing.  Cardiovascular: Regular rate and rhythm, normal S1 and S2, no S3 or S4. No murmurs, rubs, or gallops noted.  GI: Normal bowel sounds, soft, non-distended, mild tenderness of abdomen generally.  PEG tube in place. No guarding or rebound.  Skin/Integumen: No cyanosis or jaundice. No rashes noted.  Extremities: No lower extremity edema. L AKA.   Neuro: A&Ox4. Conversant. Responds appropriately in conversation. Moves all extremities with no focal deficit identified.      Medical Decision Making     45 MINUTES SPENT BY ME on the date of service doing chart review, history, exam, documentation & further activities per the note.      Data   ------------------------- PAST 24 HR DATA REVIEWED  -----------------------------------------------    I have personally reviewed the following data over the past 24 hrs:    7.9  \   8.3 (L)   / 209     136 96 (L) 81.4 (H) /  109 (H)   3.7 27 5.00 (H) \     INR:  1.47 (H) PTT:  N/A   D-dimer:  N/A Fibrinogen:  N/A       Imaging results reviewed over the past 24 hrs:   No results found for this or any previous visit (from the past 24 hours).

## 2025-03-31 NOTE — PROGRESS NOTES
[facility]  Infectious Disease Progress Note          Assessment and Plan:   Date of Admission:  3/27/2025  Date of Consult (When I saw the patient): 03/28/25        Assessment & Plan  Supriya Herr is a 75 year old female who was admitted on 3/27/2025.      Impression:  76 yo female with a history of ESRD on HD, CHF, DM, HTN, JONE, traumatic left AKA, and obesity who presented with abdominal pain and dry heaves and has since been found to have E. Faecalis bacteremia and urine culture also growing E. Faecalis.      -Afebrile.   -Leukocytosis.   -Blood cultures from 3/27: 4 of 4 with E. Faecalis by verigene testing.   -Urine culture with 50-100K E. Faecalis. Produces very little urine with ESRD. No urinary symptoms.   -CT C/A/P with mild bladder wall thickening, rectal stool ball, no abscess.   -On Meropenem  -Allergy listed to Unasyn and Penicillin (rash).      Recommendations:   High-grade Enterococcus bacteremia, blood cultures all rapidly positive even after start of antibiotics, high probability this is endocarditis  Urine may either be source initially or spillover, imaging without obvious abscess, obstruction etc  transthoracic echocardiogram unremarkable but almost certainly will need transesophageal echocardiogram as endovascular infection a major concern  For now vancomycin,.  Possible rechallenge of penicillin allergy, likely addition of ceftriaxone synergistic treatment.  The transesophageal echocardiogram almost certainly be needed here as the treatment will be dramatically different between endocarditis and simple bacteremia  SHAHNAZ pending            Interval History:   Not in room              Medications:     Current Facility-Administered Medications   Medication Dose Route Frequency Provider Last Rate Last Admin    - MEDICATION INSTRUCTIONS for Dialysis Patients -   Does not apply See Admin Instructions Ekaterina Gayle MD        amiodarone (PACERONE) tablet 200 mg  200 mg  Oral or Feeding Tube BID Ekaterina Gayle MD   200 mg at 03/30/25 2106    amLODIPine (NORVASC) tablet 10 mg  10 mg Oral or Feeding Tube Once per day on Sunday Tuesday Thursday Saturday Ekaterina Gayle MD   10 mg at 03/30/25 0850    [Held by provider] apixaban ANTICOAGULANT (ELIQUIS) tablet 5 mg  5 mg Oral or Feeding Tube BID Ekaterina Gayle MD   5 mg at 03/30/25 2106    atorvastatin (LIPITOR) tablet 20 mg  20 mg Oral or Feeding Tube QPM Ekaterina Gayle MD   20 mg at 03/30/25 2106    calcitRIOL (ROCALTROL) capsule 0.25 mcg  0.25 mcg Oral Once per day on Monday Wednesday Friday Ekaterina Gayle MD   0.25 mcg at 03/28/25 1826    cetirizine (zyrTEC) tablet 10 mg  10 mg Oral or Feeding Tube At Bedtime Ekaterina Gayle MD   10 mg at 03/30/25 2217    hydrALAZINE (APRESOLINE) tablet 75 mg  75 mg Oral or Feeding Tube Q8H Ekaterina Gayle MD   75 mg at 03/31/25 0518    insulin aspart (NovoLOG) injection (RAPID ACTING)  1-7 Units Subcutaneous TID AC Ekaterina Gayle MD   1 Units at 03/30/25 1811    insulin aspart (NovoLOG) injection (RAPID ACTING)  1-5 Units Subcutaneous At Bedtime Ekaterina Gayle MD        insulin glargine (LANTUS PEN) injection 12 Units  12 Units Subcutaneous BID Ganesh Lawson MD   12 Units at 03/31/25 0800    midodrine (PROAMATINE) tablet 5 mg  5 mg Oral Once per day on Monday Wednesday Friday Ekaterina Gayle MD   5 mg at 03/31/25 0802    pantoprazole (PROTONIX) 2 mg/mL suspension 40 mg  40 mg Per Feeding Tube QAM AC Ekaterina Gayle MD   40 mg at 03/30/25 0630    polyethylene glycol (MIRALAX) Packet 17 g  17 g Oral or Feeding Tube BID Ekaterina Gayle MD        Prosource TF20 ENfit Compatibl EN LIQD (PROSOURCE TF20) packet 60 mL  1 packet Per Feeding Tube Daily Ekaterina Gayle MD   60 mL at 03/30/25 1217    sennosides (SENOKOT) tablet 2 tablet  2 tablet Oral BID Ekaterina Gayle MD        sertraline (ZOLOFT) tablet 75 mg  75 mg Oral or Feeding Tube Daily  "Ekaterina Gayle MD   75 mg at 03/30/25 0853    sevelamer carbonate (RENVELA) tablet 1,600 mg  1,600 mg Oral TID w/meals Ekaterina Gayle MD   1,600 mg at 03/30/25 1811    sodium chloride (PF) 0.9% PF flush 3 mL  3 mL Intracatheter Q8H VÍCTOR Ekaterina Gayle MD   3 mL at 03/31/25 0524    sodium chloride 0.9% BOLUS 500 mL  500 mL Hemodialysis Machine Once Aime Langley DO        vancomycin (VANCOCIN) 1,500 mg in 0.9% NaCl 265 mL intermittent infusion  1,500 mg Intravenous Once Ekaterina Gayle MD        vancomycin place acrdoso - receiving intermittent dosing  1 each Intravenous See Admin Instructions Ekaterina Gayle MD        vitamin D3 (CHOLECALCIFEROL) tablet 50 mcg  50 mcg Oral or Feeding Tube Daily Ekaterina Gayle MD   50 mcg at 03/30/25 1139                  Physical Exam:   Blood pressure 109/51, pulse 61, temperature 98.2  F (36.8  C), temperature source Oral, resp. rate 19, height 1.676 m (5' 6\"), weight 94 kg (207 lb 3.7 oz), SpO2 100%, not currently breastfeeding.  Wt Readings from Last 2 Encounters:   03/31/25 94 kg (207 lb 3.7 oz)   03/25/25 92.5 kg (204 lb)     Vital Signs with Ranges  Temp:  [97.3  F (36.3  C)-98.3  F (36.8  C)] 98.2  F (36.8  C)  Pulse:  [57-71] 61  Resp:  [15-39] 19  BP: ()/(33-67) 109/51  SpO2:  [88 %-100 %] 100 %           Data:   All microbiology laboratory data reviewed.  Recent Labs   Lab Test 03/31/25  0731 03/30/25  2222 03/30/25  0616 03/29/25  1139   WBC 7.9  --  7.3 9.9   HGB 8.3* 7.6* 7.6* 7.9*   HCT 25.8*  --  23.9* 25.9*   MCV 94  --  96 97     --  189 196     Recent Labs   Lab Test 03/31/25  0731 03/30/25  0616 03/29/25  1139   CR 5.00* 3.60* 2.65*     Recent Labs   Lab Test 06/03/24  1644   *     Recent Labs   Lab Test 03/19/21  0900 06/24/18  1100 06/23/18  2348 06/23/18  2347 06/18/18  0949 05/19/18  0119 05/19/18  0056 05/19/18  0022 11/02/17  0600   CULT Light growth  Normal skin shree    Light growth  Streptococcus agalactiae " sero group B  This organism is susceptible to ampicillin, penicillin, vancomycin and the cephalosporins.   If treatment is required AND your patient is allergic to penicillin, contact the   Microbiology Lab within 5 days to request susceptibility testing.  *  Moderate growth  Candida albicans  Susceptibility testing not routinely done  * Moderate growth  Corynebacterium striatum  Identification obtained by MALDI-TOF mass spectrometry research use only database. Test   characteristics determined and verified by the Infectious Diseases Diagnostic Laboratory   ([facility]) [place], [place].  *  Light growth  Staphylococcus aureus  *  Single colony  Beta hemolytic Streptococcus group B  *  Light growth  Gram positive bacilli resembling diphtheroids  No further identification  Susceptibility testing not routinely done  *  Susceptibility testing requested by  Dr. Chen for routine sensitivities on the Corynebacterium striatum on 6.26.18. ME.    Moderate growth  Staphylococcus aureus  Susceptibility testing done on previous specimen  *  Moderate growth  Corynebacterium striatum  Identification obtained by MALDI-TOF mass spectrometry research use only database. Test   characteristics determined and verified by the Infect[facility] Diseases Diagnostic Laboratory   ([facility]) [place], [place].  Susceptibility testing not routinely done  *  Incorrectly ordered by U/Clinic  Canceled, Test credited  See Accesion number [id number] No growth No growth Moderate growth  Beta hemolytic Streptococcus group C  *  Moderate growth  Staphylococcus aureus  *  Light growth  Normal skin shree   No growth Moderate growth  Acinetobacter baumannii complex  *  Heavy growth  Beta hemolytic Streptococcus group C  *  Moderate growth  Escherichia coli  *  Moderate growth  Beta hemolytic Streptococcus group B  * No growth No growth

## 2025-03-31 NOTE — PROVIDER NOTIFICATION
MD Notification    Notified Person: MD    Notified Person Name: Dr. Oswald    Notification Date/Time: 3/30/25 2042    Notification Interaction: [employer] Messaging    Purpose of Notification: Patient having new onset of vaginal bleeding starting around 1800. Continuing to have small to moderate amounts of blood. Unclear exactly where bleed is coming from.    Orders Received: Notify house OLVIN or call RRT.    Comments:

## 2025-03-31 NOTE — PROVIDER NOTIFICATION
MD Notification    Notified Person: MD    Notified Person Name: Dr. Cardona    Notification Date/Time: 2120 3/30/25    Notification Interaction: [employer] Messaging    Purpose of Notification: Pt having new onset of vaginal bleeding around 1830. Continuing to have small amounts of blood around 2000 but has since stopped at 2100. Pt is hgb is trending down. Currently on eliquis. No mentions of vaginal bleed other than tonight.     Orders Received: Hgb check ordered.     Comments:

## 2025-03-31 NOTE — PROGRESS NOTES
Potassium   Date Value Ref Range Status   03/31/2025 3.7 3.4 - 5.3 mmol/L Final   02/02/2022 4.7 3.4 - 5.3 mmol/L Final   04/17/2021 4.2 3.4 - 5.3 mmol/L Final     Hemoglobin   Date Value Ref Range Status   03/31/2025 8.3 (L) 11.7 - 15.7 g/dL Final   04/16/2021 10.9 (L) 11.7 - 15.7 g/dL Final     Creatinine   Date Value Ref Range Status   03/31/2025 5.00 (H) 0.51 - 0.95 mg/dL Final   04/17/2021 5.98 (H) 0.52 - 1.04 mg/dL Final     Urea Nitrogen   Date Value Ref Range Status   03/31/2025 81.4 (H) 8.0 - 23.0 mg/dL Final   11/24/2021 37 (H) 7 - 30 mg/dL Final   04/17/2021 68 (H) 7 - 30 mg/dL Final     Sodium   Date Value Ref Range Status   03/31/2025 136 135 - 145 mmol/L Final   04/17/2021 137 133 - 144 mmol/L Final     INR   Date Value Ref Range Status   03/30/2025 1.47 (H) 0.85 - 1.15 Final   04/16/2021 1.14 0.86 - 1.14 Final       DIALYSIS PROCEDURE NOTE  Hepatitis status of previous patient on machine log was checked and verified ok to use with this patients hepatitis status.  Patient dialyzed for 3.5 hrs. on a K3 bath with a net fluid removal of  0 L.  A BFR of 450 ml/min was obtained via a left avf using 15 gauge needles.      The treatment plan was discussed with Dr. Prieto during the treatment.    Total heparin received during the treatment: 0 units.   Needle cannulation sites held x 10 min.       Meds  given: none   Complications: none      Person educated: patient. Knowledge base substantial. Barriers to learning: none. Educated on procedure via verbal mode. The patient/family verbalized understanding. Pt prefers verbal education style.     ICEBOAT? Timeout performed pre-treatment  I: Patient was identified using 2 identifiers  C:  Consent Signed Yes  E: Equipment preventative maintenance is current and dialysis delivery system OK to use  B:    Latest Reference Range & Units 03/03/25 09:37   Hep B Surface Agn Nonreactive  Nonreactive   Hepatitis B Core Liv Nonreactive  Nonreactive   Hepatitis B Surface  Antibody Instrument Value <8.5 m[IU]/mL <3.50   Hepatitis B Surface Antibody  Nonreactive     O: Dialysis orders present and complete prior to treatment  A: Vascular access verified and assessed prior to treatment  T: Treatment was performed at a clinically appropriate time  ?: Patient was allowed to ask questions and address concerns prior to treatment  See Adult Hemodialysis flowsheet in [facility] for further details and post assessment.  Machine water alarm in place and functioning. Transducer pods intact and checked every 15min.   Pt returned via bed.  Chlorine/Chloramine water system checked every 4 hours.  Outpatient Dialysis at [facility] ([facility]3]    Patient repositioned every 2 hours during the treatment.  Post treatment report given to SHANTA Hines regarding 0L of fluid removed, last BP of 112/68, and patient pain rating of 0/10.     Please remove patient dressing on AVF and AVG needle sites 24 hours after dialysis. If leaking occurs please apply a Band-Aid.

## 2025-03-31 NOTE — PROGRESS NOTES
Assessment and Plan:     End-stage renal disease on dialysis: Seen during dialysis.  She is running 3.5 hours with a left arm fistula and 15-gauge needles at 400 blood flow rate.  Her goal is 2 L ultrafiltration as tolerated.  K protocol, 35 bicarb, 138 sodium.    She is on oral calcitriol and oral vitamin D with dialysis.  She is getting Renvela 3 times daily as a phosphate binder.  She gets midodrine before dialysis for blood pressure support.  No additional heparin on dialysis.    We will run her Monday Wednesday and Friday while she is inpatient.            Interval History:   Congestive heart heart failure: Atrial fibrillation.    Anemia:     Diabetes:    Decubitus ulcers.    Hypertension: She is on amlodipine, hydralazine.                   Review of Systems:   Somnolent          Medications:     Current Facility-Administered Medications   Medication Dose Route Frequency Provider Last Rate Last Admin    - MEDICATION INSTRUCTIONS for Dialysis Patients -   Does not apply See Admin Instructions Ekaterina Galye MD        amiodarone (PACERONE) tablet 200 mg  200 mg Oral or Feeding Tube BID Ekaterina Gayle MD   200 mg at 03/30/25 2106    amLODIPine (NORVASC) tablet 10 mg  10 mg Oral or Feeding Tube Once per day on Sunday Tuesday Thursday Saturday Ekaterina Gayle MD   10 mg at 03/30/25 0850    [Held by provider] apixaban ANTICOAGULANT (ELIQUIS) tablet 5 mg  5 mg Oral or Feeding Tube BID Ekaterina Gayle MD   5 mg at 03/30/25 2106    atorvastatin (LIPITOR) tablet 20 mg  20 mg Oral or Feeding Tube QPM Ekaterina Gayle MD   20 mg at 03/30/25 2106    calcitRIOL (ROCALTROL) capsule 0.25 mcg  0.25 mcg Oral Once per day on Monday Wednesday Friday Ekaterina Gayle MD   0.25 mcg at 03/28/25 1826    cetirizine (zyrTEC) tablet 10 mg  10 mg Oral or Feeding Tube At Bedtime Ekaterina Gayle MD   10 mg at 03/30/25 2217    hydrALAZINE (APRESOLINE) tablet 75 mg  75 mg Oral or Feeding Tube Q8H Nalini  Ekaterina GARDNER MD   75 mg at 03/31/25 0518    insulin aspart (NovoLOG) injection (RAPID ACTING)  1-7 Units Subcutaneous TID AC Ekaterina Gayle MD   1 Units at 03/30/25 1811    insulin aspart (NovoLOG) injection (RAPID ACTING)  1-5 Units Subcutaneous At Bedtime Ekaterina Gayle MD        insulin glargine (LANTUS PEN) injection 12 Units  12 Units Subcutaneous BID Ganesh Lawson MD   12 Units at 03/31/25 0800    midodrine (PROAMATINE) tablet 5 mg  5 mg Oral Once per day on Monday Wednesday Friday Ekaterina Gayle MD   5 mg at 03/31/25 0802    pantoprazole (PROTONIX) 2 mg/mL suspension 40 mg  40 mg Per Feeding Tube QAM AC Ekaterina Gayle MD   40 mg at 03/30/25 0630    polyethylene glycol (MIRALAX) Packet 17 g  17 g Oral or Feeding Tube BID Ekaterina Gayle MD        Prosource TF20 ENfit Compatibl EN LIQD (PROSOURCE TF20) packet 60 mL  1 packet Per Feeding Tube Daily Ekaterina Gayle MD   60 mL at 03/30/25 1217    sennosides (SENOKOT) tablet 2 tablet  2 tablet Oral BID Ekaterina Gayle MD        sertraline (ZOLOFT) tablet 75 mg  75 mg Oral or Feeding Tube Daily Ekaterina Gayle MD   75 mg at 03/30/25 0853    sevelamer carbonate (RENVELA) tablet 1,600 mg  1,600 mg Oral TID w/meals Ekaterina Gayle MD   1,600 mg at 03/30/25 1811    sodium chloride (PF) 0.9% PF flush 3 mL  3 mL Intracatheter Q8H VÍCTOR Ekaterina Gayle MD   3 mL at 03/31/25 0524    sodium chloride 0.9% BOLUS 500 mL  500 mL Hemodialysis Machine Once Aime Langley DO        vancomycin place cardoso - receiving intermittent dosing  1 each Intravenous See Admin Instructions Ekaterina Gayle MD        vitamin D3 (CHOLECALCIFEROL) tablet 50 mcg  50 mcg Oral or Feeding Tube Daily Ekaterina Gayle MD   50 mcg at 03/30/25 113     Current Facility-Administered Medications   Medication Dose Route Frequency Provider Last Rate Last Admin    dextrose 10% infusion   Intravenous Continuous PRN Ekaterina Gayle MD         Current active  medications and PTA medications reviewed, see medication list for details.            Physical Exam:   Vitals were reviewed  Patient Vitals for the past 24 hrs:   BP Temp Temp src Pulse Resp SpO2 Weight   25 1030 104/40 -- -- 59 23 100 % --   25 1015 (!) 99/36 -- -- 58 (!) 39 100 % --   25 1006 (!) 97/34 -- -- 57 (!) 32 100 % --   25 1000 (!) 73/33 -- -- 57 22 100 % --   25 0945 91/40 -- -- 58 19 100 % --   25 0937 (!) 101/39 -- -- 59 22 100 % --   25 0930 (!) 96/37 -- -- 59 (!) 37 100 % --   25 0915 (!) 104/35 -- -- 59 15 99 % --   25 0900 106/55 -- -- 59 29 100 % --   25 0846 109/67 -- -- 62 26 99 % --   25 0836 (!) 109/36 -- -- 61 25 98 % --   25 0743 (!) 149/43 98.2  F (36.8  C) Oral 67 24 95 % --   25 0516 (!) 149/47 98.3  F (36.8  C) Axillary 70 16 99 % 94 kg (207 lb 3.7 oz)   25 2337 (!) 165/47 97.5  F (36.4  C) Oral 71 18 100 % --   25 2230 -- -- -- -- -- 98 % --   25 2100 -- -- -- -- -- (!) 88 % --   25 2030 -- -- -- -- -- (!) 91 % --   25 1533 -- -- -- -- -- 93 % --   25 1530 (!) 146/39 97.3  F (36.3  C) Oral 64 19 (!) 89 % --   25 1212 -- -- -- -- -- 93 % --   25 1108 (!) 121/38 98.2  F (36.8  C) Oral 61 20 99 % --   25 1051 -- -- -- 60 (!) 0 92 % --   25 1050 121/53 -- -- 60 (!) 45 95 % --       Temp:  [97.3  F (36.3  C)-98.3  F (36.8  C)] 98.2  F (36.8  C)  Pulse:  [57-71] 59  Resp:  [0-45] 23  BP: ()/(33-67) 104/40  SpO2:  [88 %-100 %] 100 %    Temperatures:  Current - Temp: 98.2  F (36.8  C); Max - Temp  Av.9  F (36.6  C)  Min: 97.3  F (36.3  C)  Max: 98.3  F (36.8  C)  Respiration range: Resp  Av.9  Min: 0  Max: 45  Pulse range: Pulse  Av.2  Min: 57  Max: 71  Blood pressure range: Systolic (24hrs), Av , Min:73 , Max:165   ; Diastolic (24hrs), Av, Min:33, Max:67    Pulse oximetry range: SpO2  Av.9 %  Min: 88 %  Max: 100 %    I/O  last 3 completed shifts:  In: 863 [P.O.:360; NG/GT:503]  Out: -       Intake/Output Summary (Last 24 hours) at 3/31/2025 1049  Last data filed at 3/31/2025 0000  Gross per 24 hour   Intake 863 ml   Output --   Net 863 ml       Somnolent and minimally responsive  Nasal cannula O2  Left arm fistula with needles in place       Wt Readings from Last 4 Encounters:   03/31/25 94 kg (207 lb 3.7 oz)   03/25/25 92.5 kg (204 lb)   03/17/25 92.7 kg (204 lb 5.9 oz)   03/06/25 101.8 kg (224 lb 6.9 oz)          Data:          Lab Results   Component Value Date     03/31/2025     03/30/2025     03/29/2025     04/17/2021     04/09/2021     11/18/2020    Lab Results   Component Value Date    CHLORIDE 96 03/31/2025    CHLORIDE 100 03/30/2025    CHLORIDE 97 03/29/2025    CHLORIDE 106 11/24/2021    CHLORIDE 109 11/23/2021    CHLORIDE 110 11/22/2021    CHLORIDE 105 04/17/2021    CHLORIDE 101 04/09/2021    CHLORIDE 106 11/18/2020    Lab Results   Component Value Date    BUN 81.4 03/31/2025    BUN 55.7 03/30/2025    BUN 33.3 03/29/2025    BUN 37 11/24/2021    BUN 69 11/23/2021    BUN 64 11/22/2021    BUN 68 04/17/2021    BUN 53 04/09/2021    BUN 44 11/18/2020      Lab Results   Component Value Date    POTASSIUM 3.7 03/31/2025    POTASSIUM 3.6 03/30/2025    POTASSIUM 3.8 03/29/2025    POTASSIUM 4.7 02/02/2022    POTASSIUM 3.8 11/24/2021    POTASSIUM 6.6 11/23/2021    POTASSIUM 4.2 04/17/2021    POTASSIUM 3.8 04/16/2021    POTASSIUM 3.9 04/09/2021    Lab Results   Component Value Date    CO2 27 03/31/2025    CO2 24 03/30/2025    CO2 27 03/29/2025    CO2 31 11/24/2021    CO2 23 11/23/2021    CO2 24 11/22/2021    CO2 23 04/17/2021    CO2 22 04/09/2021    CO2 29 11/18/2020    Lab Results   Component Value Date    CR 5.00 03/31/2025    CR 3.60 03/30/2025    CR 2.65 03/29/2025    CR 5.98 04/17/2021    CR 4.35 04/16/2021    CR 5.28 04/09/2021        Recent Labs   Lab Test 03/31/25  0731 03/30/25  2222  03/30/25  0616 03/29/25  1139   WBC 7.9  --  7.3 9.9   HGB 8.3* 7.6* 7.6* 7.9*   HCT 25.8*  --  23.9* 25.9*   MCV 94  --  96 97     --  189 196     Recent Labs   Lab Test 03/29/25  1139 03/28/25  0320 03/27/25  1135   AST 12 11 16   ALT 14 16 22   ALKPHOS 82 93 92   BILITOTAL 0.3 0.4 0.4       Recent Labs   Lab Test 03/31/25  0731 03/17/25  0936 03/09/25  1301   MAG 2.3 2.5* 2.3     Recent Labs   Lab Test 03/31/25  0731 03/17/25  0936 03/09/25  1301   PHOS 3.7 4.8* 4.6*     Recent Labs   Lab Test 03/31/25  0731 03/30/25  0616 03/29/25  1139   JODY 10.2 9.9 10.4       Lab Results   Component Value Date    JODY 10.2 03/31/2025     Lab Results   Component Value Date    WBC 7.9 03/31/2025    HGB 8.3 (L) 03/31/2025    HCT 25.8 (L) 03/31/2025    MCV 94 03/31/2025     03/31/2025     Lab Results   Component Value Date     03/31/2025    POTASSIUM 3.7 03/31/2025    CHLORIDE 96 (L) 03/31/2025    CO2 27 03/31/2025     (H) 03/31/2025     Lab Results   Component Value Date    BUN 81.4 (H) 03/31/2025    CR 5.00 (H) 03/31/2025     Lab Results   Component Value Date    MAG 2.3 03/31/2025     Lab Results   Component Value Date    PHOS 3.7 03/31/2025       Creatinine   Date Value Ref Range Status   03/31/2025 5.00 (H) 0.51 - 0.95 mg/dL Final   03/30/2025 3.60 (H) 0.51 - 0.95 mg/dL Final   03/29/2025 2.65 (H) 0.51 - 0.95 mg/dL Final   03/28/2025 3.60 (H) 0.51 - 0.95 mg/dL Final   03/27/2025 2.67 (H) 0.51 - 0.95 mg/dL Final   03/17/2025 5.55 (H) 0.51 - 0.95 mg/dL Final   04/17/2021 5.98 (H) 0.52 - 1.04 mg/dL Final   04/16/2021 4.35 (H) 0.52 - 1.04 mg/dL Final   04/09/2021 5.28 (H) 0.52 - 1.04 mg/dL Final   11/18/2020 4.23 (H) 0.52 - 1.04 mg/dL Final   11/16/2020 5.08 (H) 0.52 - 1.04 mg/dL Final   09/25/2020 6.87 (H) 0.52 - 1.04 mg/dL Final       Attestation:  I have reviewed today's vital signs, notes, medications, labs and imaging.  Seen during dialysis.  End-stage renal disease on dialysis: Seen during  dialysis.     Braden Oneill MD

## 2025-03-31 NOTE — PLAN OF CARE
PRIMARY Concern: Abd pain/ Gen. Weakness  SAFETY RISK Concerns (fall risk, behaviors, etc.): Fall risk  Aggression Tool Color: Green  Isolation/Type:n/a  Tests/Procedures for NEXT shift: AM labs, Plan for SHAHNAZ 3/31/25, Dialysis run planned as well  Consults? (Pending/following, signed-off?) GI, WOC, PT, ID following  Where is patient from? (Home, TCU, etc.): TCU [facility]  Other Important info for NEXT shift: Pt tried to do CPAP, but uncomfortable with the mask, RT tried different mask but still pt having a hard time staying on a mask, shifted to NC 1L later on. Has L arm fistula, L AKA, HD schedule MWF  - Pt to notify staff when ready for CPAP  - Pt is on calorie count for 3x days  Anticipated DC date & active delays: TBD     SUMMARY NOTE:  Orientation/Cognitive: A&Ox2-3- disoriented to time and situation  Observation Goals (Met/ Not Met): INP  Mobility Level/Assist Equipment: A2 Lift, refuses repositioning  Antibiotics & Plan (IV/po, length of tx left): Vanco IV  Pain Management: Denies  Complete Pain Reassessment: Y/N Y Due next: next shift  Tele/VS/O2: VSS on 1L NC Tele: SR  ABNL Lab/BG: Hgb 7.6, Crea 3.6  Diet: NPO Midnight  Bowel/Bladder: incont, no vaginal bleeding and loose stools this shift  Skin Concerns: Left buttocks open wound- wound care applied  Drains/Devices: PIV SL, PEG Tube  Patient Stated Goal for Today: sleep

## 2025-03-31 NOTE — CONSULTS
"CLINICAL NUTRITION SERVICES - ASSESSMENT NOTE    Registered Dietitian Interventions:  OK to decrease nocturnal cycle to 12 hours as follows,     Grecia Estrella Renal @ goal of 65 ml x 12 hours  (from 1800 to 0600 daily) will provide: 780 ml formula, 1404 kcals, 62 g PRO, 134 g CHO, 16 g fiber, and 515 ml free H2O daily.     + ProSource TF20 once daily (80 kcal and 20 g protein)  Total (TF + ProSource TF20)= 1484 kcal (22 kcal/kg), 82 g protein (1.2 g/kg),   - This meets 89% energy and 100% PRO needs     Future/Additional Recommendations:  Monitor PO vs ability to further decrease EN provisions (at least >60% to discontinue EN support)     REASON FOR ASSESSMENT  Provider order - Registered Dietitian to order TF per Medical Nutrition Therapy Guidelines  Calorie counts 3/31-4/02    INFORMATION OBTAINED  Patient not available for interview due to  receiving HD today    NUTRITION HISTORY  - CALLIE   Home nutrition support plan: Grecia Estrella Renal at 65 mL/hr x 14 hours (from 1600 to 0600 daily) via existing G-tube = 1638 kcal, 73 g protein, 157 g CHO, 18 g fiber, 600 mL H2O  ProSource TF20 once daily (80 kcal and 20 g protein)  Total (TF + ProSource TF20)= 1718 kcal, 93 g protein  Flushes 60 mL before and after the cycle feed  - This meets 100% nutrition needs    CURRENT NUTRITION ORDERS  Diet: Orders Placed This Encounter      NPO for Procedure/Surgery per Anesthesia Guidelines Except for: Meds; Clear liquids before procedure/surgery: ADULT (Age GREATER than or Equal to 18 years) - Clear liquids 2 hours before procedure/surgery  Snacks/Supplements: Per RD note 3/11 \"She dislikes supplements - \"I won't drink them\"\"    CURRENT INTAKE/TOLERANCE  - Flowsheets show a no appetite records and 2 intakes yesterday of 50%  - [employer] (meal ordering system), shows pt ordered meals BID yesterday   ---> when combined, it appears pt took in about 386 kcal and 12 g PRO yesterday (<25% needs)    LABS  Nutrition-relevant labs: BUN 81.4 (H), " "Cr 5 (H), -189 (H), Lytes NL    MEDICATIONS  Nutrition-relevant medications: medium sliding scale insulin, Lantus 12 units BID, Miralax BID, Senna  BID, Renvela, D-3,     ANTHROPOMETRICS  Height: 167.6 cm (5' 6\")  Admission Weight: 89.9 kg (198 lb 3.1 oz) (03/27/25 2132)   Most Recent Weight: 94 kg (207 lb 3.7 oz) (03/31/25 0516)  IBW: 59.1 kg ([place]) - 150%   BMI: Body mass index is 33.45 kg/m .   Weight History:   Wt Readings from Last 10 Encounters:   03/31/25 94 kg (207 lb 3.7 oz)   03/25/25 92.5 kg (204 lb)   03/17/25 92.7 kg (204 lb 5.9 oz)   03/06/25 101.8 kg (224 lb 6.9 oz)   02/26/25 92.5 kg (203 lb 14.8 oz)   06/14/24 101.1 kg (222 lb 14.2 oz)   06/03/24 101.1 kg (222 lb 14.2 oz)   04/24/24 101.1 kg (222 lb 14.2 oz)   02/05/24 95.8 kg (211 lb 3.2 oz)   04/21/23 98 kg (216 lb)   - up to a 3% wt loss in 10 days, pending dry wt  - Per Nephrology notes \"Clearly losing weight with 20-25 pound weight loss in the last few months.\"    --> given 2/26/25 wt, question wt loss vs fluctuating fluid status     Dosing Weight: 66.8 kg, based on adjusted wt based on lowest, admit wt    ASSESSED NUTRITION NEEDS  Estimated Energy Needs: 7498-1500 kcals/day (25 - 35 kcals/kg)  Justification: HD  Estimated Protein Needs:  grams protein/day (1.2 - 1.8 grams of pro/kg)  Justification: HD (with DM), hospitalized  Estimated Fluid Needs: 1 mL/kcal  Justification: Per provider pending fluid status     SYSTEM AND PHYSICAL FINDINGS    GI symptoms: Reviewed  Skin/wounds: 3/28 WOCN, Wound location: left buttock & intertrigo/IAD to gluteal cleft, Wound due to: Friction, Incontinence Associated Dermatitis (IAD), and Intertrigo, STATUS: initial assessment    MALNUTRITION  % Intake: CALLIE Hx - do not suspect as pt receives EN support  % Weight Loss: Unable to assess  - up to 3% wt loss in 10 days, pending dry wt but question fluid status   Subcutaneous Fat Loss: Unable to assess  Muscle Loss: Temples (temporalis muscle): Mild and " Clavicles (pectoralis and deltoids): Mild (carried over from previous assessment 3/11)  Fluid Accumulation/Edema: None noted  Malnutrition Diagnosis: Unable to determine due to lack of Hx, complete NFPE but do not suspect  Malnutrition Present on Admission: Unable to assess    NUTRITION DIAGNOSIS  Increased nutrient needs protein  related to hemodialysis while hospitalized as evidenced by need for at least 1.2 g/kg/d PRO and need for EN support with protein modulars to meet nutrition needs until able to take adequate PO intake.     INTERVENTIONS  Enteral nutrition management    GOALS  EN - goal rate to provide >85% energy and 100% PRO needs until able to further increase PO intake  PO - increase as able, need for at least >60% to discontinue EN support    MONITORING/EVALUATION  Progress toward goals will be monitored and evaluated per policy.

## 2025-03-31 NOTE — CONSULTS
Care Management Follow Up    Length of Stay (days): 3    Expected Discharge Date:       Concerns to be Addressed:       Patient plan of care discussed at interdisciplinary rounds: Yes    Anticipated Discharge Disposition: Home, Home Care, Transitional Care              Anticipated Discharge Services: Transportation Services, Home Care  Anticipated Discharge DME:      Patient/family educated on Medicare website which has current facility and service quality ratings: no  Education Provided on the Discharge Plan:    Patient/Family in Agreement with the Plan: yes    Referrals Placed by CM/SW:    Private pay costs discussed: Not applicable    Discussed  Partnership in Safe Discharge Planning  document with patient/family: Yes:      Handoff Completed: Yes, [facility] PCP: Internal handoff referral completed    Additional Information:  CC/SW is following for discharge planning. If patient were to go home with daughter, she would require a lift device, home care.   Writer spoke briefly to patient. She does not feel at this time she would want to back home.  Next Steps: follow for home care discharge vs TCU once patient is more medically ready.    Janki Watkins RN

## 2025-03-31 NOTE — PROGRESS NOTES
[facility]  Gastroenterology Progress Note     Supriya eHrr MRN# [id number]   YOB: 1950 Age: 75 year old          Assessment and Plan:   Supriya Herr is a 75 year old female with very complex past medical history including end-stage renal disease on hemodialysis, HFpEF, paroxysmal atrial fibrillation, hypertension, hyperlipidemia, anxiety, depression, chronic anemia, dysphagia status post PEG tube, type 2 diabetes, left traumatic AKA and recent hospitalization with multifactorial respiratory failure now being admitted on 3/27/2025 with abdominal pain and retching. She is found to have e/o constipation. But other evaluation notable for elevated WBC, procal and CRP of unclear etiology.      Abdominal pain  Retching, with Hx of GERD   Dysphagia s/p PEG tube (2/1/2025)   Constipation   Pt presents to the ED with vague complaints of retching/dry heaving without vomiting and some crampy lower abdominal pain.   *CT scan showed large rectal stool ball without evidence of obstruction. There was also some non-specific mild urinary bladder wall thickening. No other intra-abdominal pathology.   *Pt noted to have dysphagia, pharyngeal edema, was evaluated by ENT on 2/16.  No findings to explain dysphagia.  G-tube was placed by IR on 2/17 and was on nocturnal tube feeds.     - She may have some reflux of tube feeds or other esophageal issue.   - Xray esophagram: Normal caliber esophagus. No strictures. Mildly delayed mid to lower esophageal clearing secondary to increased tertiary peristalsis likely due to presbyesophagus.   - Continue bowel regimen  - Zofran as needed  - EGD: Upper GI endoscopy was fairly unremarkable, patient's PEG tube in good position and healthy appearing. No signs of mechanical obstruction or peptic ulcer disease.  - Recommend to continue on previous diet, and tube feeding can be restarted   - GI will sign off.  Please call with questions.        Interval  History:     doing well; no cp, sob, n/v/d, or abd pain.              Review of Systems:     C: NEGATIVE for fever, chills, change in weight  E/M: NEGATIVE for ear, mouth and throat problems  R: NEGATIVE for significant cough or SOB  CV: NEGATIVE for chest pain, palpitations or peripheral edema             Medications:   I have reviewed this patient's current medications  Current Facility-Administered Medications   Medication Dose Route Frequency Provider Last Rate Last Admin    - MEDICATION INSTRUCTIONS for Dialysis Patients -   Does not apply See Admin Instructions Ekaterina Gayle MD        amiodarone (PACERONE) tablet 200 mg  200 mg Oral or Feeding Tube BID Ekaterina Gayle MD   200 mg at 03/30/25 2106    amLODIPine (NORVASC) tablet 10 mg  10 mg Oral or Feeding Tube Once per day on Sunday Tuesday Thursday Saturday Ekaterina Gayle MD   10 mg at 03/30/25 0850    [Held by provider] apixaban ANTICOAGULANT (ELIQUIS) tablet 5 mg  5 mg Oral or Feeding Tube BID Ekaterina Gayle MD   5 mg at 03/30/25 2106    atorvastatin (LIPITOR) tablet 20 mg  20 mg Oral or Feeding Tube QPM Ekaterina Gayle MD   20 mg at 03/30/25 2106    calcitRIOL (ROCALTROL) capsule 0.25 mcg  0.25 mcg Oral Once per day on Monday Wednesday Friday Ekaterina Gayle MD   0.25 mcg at 03/28/25 1826    cetirizine (zyrTEC) tablet 10 mg  10 mg Oral or Feeding Tube At Bedtime Ekaterina Gayle MD   10 mg at 03/30/25 2217    hydrALAZINE (APRESOLINE) tablet 75 mg  75 mg Oral or Feeding Tube Q8H Ekaterina Gayle MD   75 mg at 03/31/25 0518    insulin aspart (NovoLOG) injection (RAPID ACTING)  1-7 Units Subcutaneous TID AC Ekaterina Gayle MD   1 Units at 03/30/25 1811    insulin aspart (NovoLOG) injection (RAPID ACTING)  1-5 Units Subcutaneous At Bedtime Ekateirna Gayle MD        insulin glargine (LANTUS PEN) injection 12 Units  12 Units Subcutaneous BID Ganesh Lawson MD   12 Units at 03/31/25 0800    midodrine (PROAMATINE) tablet  5 mg  5 mg Oral Once per day on Monday Wednesday Friday Ekaterina Gayle MD   5 mg at 03/31/25 0802    pantoprazole (PROTONIX) 2 mg/mL suspension 40 mg  40 mg Per Feeding Tube QAM AC Ekaterina Gayle MD   40 mg at 03/30/25 0630    polyethylene glycol (MIRALAX) Packet 17 g  17 g Oral or Feeding Tube BID Ekaterina Gayle MD        Prosource TF20 ENfit Compatibl EN LIQD (PROSOURCE TF20) packet 60 mL  1 packet Per Feeding Tube Daily Ekaterina Gayle MD   60 mL at 03/30/25 1217    sennosides (SENOKOT) tablet 2 tablet  2 tablet Oral BID Ekaterina Gayle MD        sertraline (ZOLOFT) tablet 75 mg  75 mg Oral or Feeding Tube Daily Ekaterina Gayle MD   75 mg at 03/30/25 0853    sevelamer carbonate (RENVELA) tablet 1,600 mg  1,600 mg Oral TID w/meals Ekaterina Gayle MD   1,600 mg at 03/30/25 1811    sodium chloride (PF) 0.9% PF flush 3 mL  3 mL Intracatheter Q8H VÍCTOR Ekaterina Gayle MD   3 mL at 03/31/25 0524    vancomycin (VANCOCIN) 1,500 mg in 0.9% NaCl 265 mL intermittent infusion  1,500 mg Intravenous Once Ekaterina Gayle MD        vancomycin place cardoso - receiving intermittent dosing  1 each Intravenous See Admin Instructions Ekaterina Gayle MD        vitamin D3 (CHOLECALCIFEROL) tablet 50 mcg  50 mcg Oral or Feeding Tube Daily Ekaterina Gayle MD   50 mcg at 03/30/25 1139                  Physical Exam:   Vitals were reviewed  Vital Signs with Ranges  Temp:  [97.3  F (36.3  C)-98.3  F (36.8  C)] 98.2  F (36.8  C)  Pulse:  [57-71] 61  Resp:  [15-39] 39  BP: ()/(33-75) 112/51  SpO2:  [88 %-100 %] 98 %  I/O last 3 completed shifts:  In: 863 [P.O.:360; NG/GT:503]  Out: -   Constitutional: Alert, oriented to person, place, situation.  Cooperative, lying in bed in NAD.   Respiratory:  Lungs CTAB, no labored breathing.  Cardiovascular:  Heart RRR, no MRG  GI:  Abdomen soft, NT/ND.  PEG tube intact  Skin/Integumen:  Warm, dry, non-diaphoretic.  MSK: Normal muscle tone              Data:   I reviewed the patient's new clinical lab test results.   Recent Labs   Lab Test 03/31/25  0731 03/30/25  2222 03/30/25  0616 03/29/25  1139 02/15/25  0842 02/14/25  1421 06/03/24  1644 04/24/24  0913   WBC 7.9  --  7.3 9.9   < > 4.4   < > 6.3   HGB 8.3* 7.6* 7.6* 7.9*   < > 8.4*   < > 10.1*   MCV 94  --  96 97   < > 98   < > 101*     --  189 196   < > 184   < > 139*   INR  --  1.47*  --   --   --  1.57*  --  1.11    < > = values in this interval not displayed.     Recent Labs   Lab Test 03/31/25 0731 03/30/25 0616 03/29/25  1139   POTASSIUM 3.7 3.6 3.8   CHLORIDE 96* 100 97*   CO2 27 24 27   BUN 81.4* 55.7* 33.3*   ANIONGAP 13 12 12     Recent Labs   Lab Test 03/29/25  1139 03/28/25  0320 03/27/25  1135 02/26/25  0544 02/08/25  2212 02/05/25  2004 02/02/25  0542 11/22/21  1803 09/03/21  1331 04/11/18  0959 03/29/18  1448   ALBUMIN 3.2* 3.4* 3.7   < >  --    < > 3.2*   < >  --    < >  --    BILITOTAL 0.3 0.4 0.4   < >  --    < > 0.2   < >  --    < >  --    ALT 14 16 22   < >  --    < > 22   < >  --    < >  --    AST 12 11 16   < >  --    < > 12   < >  --    < >  --    PROTEIN  --   --   --   --  70*  --   --   --  100*  --  >499*   LIPASE  --   --   --   --   --   --  15  --   --   --   --     < > = values in this interval not displayed.       I reviewed the patient's new imaging results.    All laboratory data reviewed  All imaging studies reviewed by me.    MARY Askew,  3/29/2025  [facility]  Office : [phone]  Cell: [phone] (Dr. Servin)

## 2025-03-31 NOTE — PLAN OF CARE
Goal Outcome Evaluation:    Shift Summary [id number]    Admitting Diagnosis: Generalized weakness [R53.1]  Constipation, unspecified constipation type [K59.00]  Leukocytosis, unspecified type [D72.829]  ESRD on hemodialysis (H) [N18.6, Z99.2]     A&Ox3, disoriented to time.  Ax2, lift. PEG tub in place. Pt denied pain during shift. Pt off the floor most of the shift, had dialysis and XR done. Discharge TBD.

## 2025-03-31 NOTE — PHARMACY-VANCOMYCIN DOSING SERVICE
Pharmacy Vancomycin Note  Date of Service 2025  Patient's  1950   75 year old, female    Indication: Bacteremia  Day of Therapy: 4  Current vancomycin regimen: intermittent levels   Current vancomycin monitoring method: Renal Replacement Therapy  Current vancomycin therapeutic monitoring goal: 15-20 mg/L      Current estimated CrCl = Estimated Creatinine Clearance: 11.1 mL/min (A) (based on SCr of 5 mg/dL (H)).    Creatinine for last 3 days  3/29/2025: 11:39 AM Creatinine 2.65 mg/dL  3/30/2025:  6:16 AM Creatinine 3.60 mg/dL  3/31/2025:  7:31 AM Creatinine 5.00 mg/dL    Recent Vancomycin Levels (past 3 days)  3/31/2025:  7:31 AM Vancomycin 15.7 ug/mL    Vancomycin IV Administrations (past 72 hours)                     vancomycin (VANCOCIN) 1,750 mg in 0.9% NaCl 517.5 mL intermittent infusion (mg) 1,750 mg New Bag 25                    Nephrotoxins and other renal medications (From now, onward)      Start     Dose/Rate Route Frequency Ordered Stop    25 1400  vancomycin (VANCOCIN) 1,500 mg in 0.9% NaCl 265 mL intermittent infusion         1,500 mg  over 90 Minutes Intravenous ONCE 25 1109      25 1003  vancomycin place cardoso - receiving intermittent dosing         1 each Intravenous SEE ADMIN INSTRUCTIONS 25 1003                 Contrast Orders - past 72 hours (72h ago, onward)      Start     Dose/Rate Route Frequency Stop    25 1330  perflutren diluted 1mL to 2mL with saline (OPTISON) diluted injection 3 mL         3 mL Intravenous ONCE 25 1328            Interpretation of levels and current regimen:  Vancomycin level is reflective of therapeutic level    Has serum creatinine changed greater than 50% in last 72 hours: No    Urine output:  diminished urine output    Renal Function: ESRD on Dialysis      Plan:  Give one time dose of 1500mg (15mg/kg)  x 1 for pre-dialysis level of 15.7   Vancomycin monitoring method: Renal Replacement Therapy  Vancomycin  therapeutic monitoring goal: 15-20 mg/L.  Serum creatinine levels will be ordered a minimum of twice weekly.    Deena Dinero RPH

## 2025-03-31 NOTE — PROGRESS NOTES
[id number]     PRIMARY Concern: Abd pain/ Gen. Weakness  SAFETY RISK Concerns (fall risk, behaviors, etc.): Fall risk  Aggression Tool Color: Green  Isolation/Type:n/a  Tests/Procedures for NEXT shift: Plan for [facility]AHNAZ 3/31/25, Dialysis run planned as well  Consults? (Pending/following, signed-off?) GI, WOC, PT, ID following  Where is patient from? (Home, TCU, etc.): TCU [facility]  Other Important info for NEXT shift:  Has L arm fistula, L AKA, HD schedule MWF  - Pt to notify staff when ready for CPAP  - Pt is on calorie count for next 3x days  Anticipated DC date & active delays: TBD    SUMMARY NOTE:  Orientation/Cognitive: A&Ox2-3- disoriented to time and situation  Observation Goals (Met/ Not Met): INP  Mobility Level/Assist Equipment: A2 Lift  Antibiotics & Plan (IV/po, length of tx left): Vanco IV  Pain Management: Denies  Complete Pain Reassessment: Y/N Y Due next: next shift  Tele/VS/O2: VSS on RA- intermittently drops to 88% but rebounds to low 90's Tele: SR  ABNL Lab/BG:   Diet: NPO Midnight- stop tube feeding at midnight  Total TF intake 368 from [id number]  Bowel/Bladder: incont  Skin Concerns: Left buttocks open wound- wound care applied  Drains/Devices: PIV SL- TF running @ 65ml/hr- to be stopped at midnight  Patient Stated Goal for Today: none

## 2025-03-31 NOTE — CONSULTS
"[facility]  WO Nurse Inpatient Assessment     Consulted for: \"buttock\", 2nd consult received for \"Sacral wound:    Summary: POA open wound to left buttock & intertrigo/IAD to gluteal cleft    3/31: Duplicate consult received at end of day on Friday 3/28. WOC RN saw patient on 3/28 prior to the 2nd consult, but there was no breakdown noted to the sacrum. Scattered scar tissue to the area and left buttock wound were present, as noted/pictured below. This writer discussed patient with bedside RN today, and there are no new skin concerns at this time. [facility] will follow up with patient weekly as previously planned.     [facility] nurse follow-up plan: weekly    Patient History (according to provider note(s):      \"Supriya Herr is a 75 year old female with very complex past medical history including end-stage renal disease on hemodialysis, HFpEF, paroxysmal atrial fibrillation, hypertension, hyperlipidemia, anxiety, depression, chronic anemia, dysphagia status post PEG tube, type 2 diabetes, left traumatic AKA and recent hospitalization with multifactorial respiratory failure now being admitted on 3/27/2025 with abdominal pain and retching. She is found to have e/o constipation. But other evaluation notable for elevated WBC, procal and CRP of unclear etiology.     Pf note, pt with multiple recent hospitalizations. Per last discharge summary:   \"Hospitalized at [facility] from 3/2 - 3/6/2025 with shortness of breath likely multifactorial.  Volume status was difficult to determine, underwent right heart cath on 3/5 showed mildly elevated right atrial and PCWP readings.  Was treated for acute respiratory failure likely from heart failure exacerbation, atrial fibrillation, end-stage renal disease and discharged to care facility with supplemental nocturnal oxygen.\"  \"Prolonged hospitalized at [facility] from 1/8 to 2/26/2025 for acute hypoxic respiratory failure multifactorial, was intubated ( 1/9-1/18), reintubated " "(1/20 - 1/25).  Then was treated for shock with pressors, influenza A pneumonia, hospital-acquired pneumonia with intravenous antibiotics, steroids, antivirals.  Then also noted to have heart failure exacerbation, recurrent A-fib with RVR, subsequent bradycardia.  Then also noted to have encephalopathy likely from infectious, metabolic etiology and delirium.  During that hospitalization was noted to have dysphagia, pharyngeal edema, was evaluated by ENT on 2/16.  No findings to explain dysphagia.  G-tube was placed by IR on 2/17 and was on nocturnal tube feeds.  Discharged to TCU for rehabilitation.\"    Assessment:      Areas visualized during today's visit:  not assessed today, previous assessment copied for continuity    Wound location:     Last photo: 3/28  Wound due to: Friction, Incontinence Associated Dermatitis (IAD), and Intertrigo  Wound history/plan of care: Multiple pink scars noted to buttocks/sacrococcygeal area. Cluster of open wounds to left buttocks, etiology unknown but suspect this is related to incontinence, friction, and there may be a pressure component. Small area of intertrigo/IAD to gluteal cleft.    Wound base: Left buttock - Epidermis, Serous scabbing, Moist, Pink, Bumpy , and Yellow, Gluteal cleft Moist, Pink, Smooth , and Pale     Palpation of the wound bed: normal      Drainage: small     Description of drainage: serous     Measurements (length x width x depth, in cm): Left buttock is ~ 4 x 2.5 x 0.1 cm, Gluteal cleft is 0.5 x 0.1 x 0.2 cm     Tunneling: N/A     Undermining: N/A  Periwound skin: Intact and Scar tissue      Color: normal and consistent with surrounding tissue, pale, and pink      Temperature: normal   Odor: none  Pain: mild and during dressing change  Pain interventions prior to dressing change: patient tolerated well, slow and gentle cares , and distraction  Treatment goal: Heal , Infection control/prevention, Protection, and Simplify plan of care  STATUS: initial " "assessment  Supplies ordered: gathered, at bedside, supplies stored on unit, discussed with RN, and discussed with patient        Treatment Plan:     Buttocks wound(s): BID and after episodes of incontinence  Cleanse skin with Martir spray and soft white Austin cloths   Apply CriticAid barrier paste  Follow PIP plan    Pressure Injury Prevention (PIP) Plan:  If patient is declining pressure injury prevention interventions: Explore reason why and address patient's concerns, Educate on pressure injury risk and prevention intervention(s), If patient is still declining, document \"informed refusal\" , and Ensure Care team is aware ( provider, charge nurse, etc)  Mattress: Follow bed algorithm, add Low Air Loss (Air+) mattress pump if skin is very moist or constantly moist.   HOB: Maintain at or below 30 degrees, unless contraindicated  Repositioning in bed: Every 1-2 hours , Left/right positioning; avoid supine, and Raise foot of bed prior to raising head of bed, to reduce patient sliding down (shear)  Heels: Keep elevated off mattress and Pillows under calves  Protective Dressing: None  Positioning Equipment:None  Chair positioning: Assist patient to reposition hourly and Do NOT use a donut for sitting (this increases pressure to smaller area and creates a higher potential for injury)    If patient has a buttock pressure injury, or high risk for PI use chair cushion or SPS.  Moisture Management: Perineal cleansing /protection: Follow Incontinence Protocol, Avoid brief in bed, Clean and dry skin folds with bathing , and Moisturize dry skin  Under Devices: Inspect skin under all medical devices during skin inspection , Ensure tubes are stabilized without tension, and Ensure patient is not lying on medical devices or equipment when repositioned  Ask provider to discontinue device when no longer needed.       Orders: Written    RECOMMEND PRIMARY TEAM ORDER: None, at this time  Education provided: importance of repositioning, " plan of care, wound progress, Infection prevention , Moisture management, Hygiene, and Off-loading pressure  Discussed plan of care with: Patient and Nurse  Notify WOC if wound(s) deteriorate.  Nursing to notify the Provider(s) and re-consult the WO Nurse if new skin concern.    DATA:     Current support surface: Standard  Standard gel mattress (Isoflex)  Containment of urine/stool: Incontinence Protocol and Incontinent pad in bed  BMI: Body mass index is 33.45 kg/m .   Active diet order: Orders Placed This Encounter      NPO for Procedure/Surgery per Anesthesia Guidelines Except for: Meds; Clear liquids before procedure/surgery: ADULT (Age GREATER than or Equal to 18 years) - Clear liquids 2 hours before procedure/surgery       Output:   I/O last 3 completed shifts:  In: 863 [P.O.:360; NG/GT:503]  Out: -      Labs:   Recent Labs   Lab 03/31/25  0731 03/30/25  2222 03/30/25  0616 03/29/25  1139   ALBUMIN  --   --   --  3.2*   HGB 8.3* 7.6*   < > 7.9*   INR  --  1.47*  --   --    WBC 7.9  --    < > 9.9    < > = values in this interval not displayed.     Pressure injury risk assessment:   Sensory Perception: 3-->slightly limited  Moisture: 3-->occasionally moist  Activity: 1-->bedfast  Mobility: 2-->very limited  Nutrition: 2-->probably inadequate  Friction and Shear: 1-->problem  Ben Score: 12    Jennifer Carney RN CWCN  Pager no longer is use, please contact through [employer] group: [facility] Nurse Eva

## 2025-03-31 NOTE — PROGRESS NOTES
[facility]  Gastroenterology Progress Note     Supriya Herr MRN# [id number]   YOB: 1950 Age: 75 year old          Assessment and Plan:       ESRD on hemodialysis (H)    Generalized weakness    Constipation, unspecified constipation type    Leukocytosis, unspecified type  Patient did well no new GI complaints.  Patient has been n.p.o.  Upper GI endoscopy was fairly unremarkable patient's PEG tube in good position and healthy appearing there is no signs of mechanical obstruction or peptic ulcer disease.  Number recommend to continue on previous diet tube feeding can be restarted finding and plan discussed with the hospitalist team.            Generalized weakness  Leukocytosis, unspecified type  Constipation, unspecified constipation type  ESRD on hemodialysis (H)      Interval History:     doing well; no cp, sob, n/v/d, or abd pain.              Review of Systems:     C: NEGATIVE for fever, chills, change in weight  E/M: NEGATIVE for ear, mouth and throat problems  R: NEGATIVE for significant cough or SOB  CV: NEGATIVE for chest pain, palpitations or peripheral edema             Medications:   I have reviewed this patient's current medications  Current Facility-Administered Medications   Medication Dose Route Frequency Provider Last Rate Last Admin    - MEDICATION INSTRUCTIONS for Dialysis Patients -   Does not apply See Admin Instructions Ekaterina Gayle MD        amiodarone (PACERONE) tablet 200 mg  200 mg Oral or Feeding Tube BID Ekaterina Gayle MD   200 mg at 03/30/25 2106    amLODIPine (NORVASC) tablet 10 mg  10 mg Oral or Feeding Tube Once per day on Sunday Tuesday Thursday Saturday Ekaterina Gayle MD   10 mg at 03/30/25 0850    [Held by provider] apixaban ANTICOAGULANT (ELIQUIS) tablet 5 mg  5 mg Oral or Feeding Tube BID Ekaterina Gayle MD   5 mg at 03/30/25 2106    atorvastatin (LIPITOR) tablet 20 mg  20 mg Oral or Feeding Tube QPM Ekaterina Gayle  MD KENDRA   20 mg at 03/30/25 2106    calcitRIOL (ROCALTROL) capsule 0.25 mcg  0.25 mcg Oral Once per day on Monday Wednesday Friday Ekaterina Gayle MD   0.25 mcg at 03/28/25 1826    cetirizine (zyrTEC) tablet 10 mg  10 mg Oral or Feeding Tube At Bedtime Ekaterina Gayle MD        hydrALAZINE (APRESOLINE) tablet 75 mg  75 mg Oral or Feeding Tube Q8H Ekaterina Gayle MD   75 mg at 03/30/25 2106    insulin aspart (NovoLOG) injection (RAPID ACTING)  1-7 Units Subcutaneous TID AC Ekaterina Gayle MD   1 Units at 03/30/25 1811    insulin aspart (NovoLOG) injection (RAPID ACTING)  1-5 Units Subcutaneous At Bedtime Ekaterina Gayle MD        insulin glargine (LANTUS PEN) injection 12 Units  12 Units Subcutaneous BID Ganesh Lawson MD   12 Units at 03/30/25 2107    midodrine (PROAMATINE) tablet 5 mg  5 mg Oral Once per day on Monday Wednesday Friday Ekaterina Gayle MD   5 mg at 03/28/25 1826    No heparin via hemodialysis machine   Does not apply Once Aime Langley,         pantoprazole (PROTONIX) 2 mg/mL suspension 40 mg  40 mg Per Feeding Tube QAM AC Ekaterina Gayle MD   40 mg at 03/30/25 0630    polyethylene glycol (MIRALAX) Packet 17 g  17 g Oral or Feeding Tube BID Ekaterina Gayle MD        Prosource TF20 ENfit Compatibl EN LIQD (PROSOURCE TF20) packet 60 mL  1 packet Per Feeding Tube Daily Ekaterina Gayle MD   60 mL at 03/30/25 1217    sennosides (SENOKOT) tablet 2 tablet  2 tablet Oral BID Ekaterina Gayle MD        sertraline (ZOLOFT) tablet 75 mg  75 mg Oral or Feeding Tube Daily Ekaterina Gayle MD   75 mg at 03/30/25 0853    sevelamer carbonate (RENVELA) tablet 1,600 mg  1,600 mg Oral TID w/meals Ekaterina Gayle MD   1,600 mg at 03/30/25 1811    sodium chloride (PF) 0.9% PF flush 3 mL  3 mL Intracatheter Q8H VÍCTOR Ekaterina Gayle MD   3 mL at 03/30/25 1353    sodium chloride 0.9% BOLUS 200 mL  200 mL Hemodialysis Machine Once iAme Langley DO        [START ON  3/31/2025] sodium chloride 0.9% BOLUS 250 mL  250 mL Intravenous Once in dialysis/CRRT Aime Langley DO        [START ON 3/31/2025] sodium chloride 0.9% BOLUS 500 mL  500 mL Hemodialysis Machine Once Aime Langley DO        vancomycin place cardoso - receiving intermittent dosing  1 each Intravenous See Admin Instructions Ekaterina Gayle MD        vitamin D3 (CHOLECALCIFEROL) tablet 50 mcg  50 mcg Oral or Feeding Tube Daily Ekaterina Gayle MD   50 mcg at 03/30/25 1139                  Physical Exam:   Vitals were reviewed  Vital Signs with Ranges  Temp:  [97.3  F (36.3  C)-98.2  F (36.8  C)] 97.3  F (36.3  C)  Pulse:  [60-93] 64  Resp:  [0-61] 19  BP: (121-154)/() 146/39  SpO2:  [86 %-100 %] 93 %  I/O last 3 completed shifts:  In: 515 [P.O.:240; NG/GT:210]  Out: -   Cardiovascular: negative, PMI normal. No lifts, heaves, or thrills. RRR. No murmurs, clicks gallops or rub  Respiratory: negative, Percussion normal. Good diaphragmatic excursion. Lungs clear  Head: Normocephalic. No masses, lesions, tenderness or abnormalities  Neck: Neck supple. No adenopathy. Thyroid symmetric, normal size,, Carotids without bruits.  Abdomen: Abdomen soft, non-tender. BS normal. No masses, organomegaly  SKIN: no suspicious lesions or rashes           Data:   I reviewed the patient's new clinical lab test results.   Recent Labs   Lab Test 03/30/25  0616 03/29/25  1139 03/28/25  0320 02/15/25  0842 02/14/25  1421 06/03/24  1644 04/24/24  0913 08/16/21  1816 04/16/21  0800   WBC 7.3 9.9 21.3*   < > 4.4   < > 6.3   < > 6.4   HGB 7.6* 7.9* 8.1*   < > 8.4*   < > 10.1*   < > 10.9*   MCV 96 97 95   < > 98   < > 101*   < > 98    196 197   < > 184   < > 139*   < > 194   INR  --   --   --   --  1.57*  --  1.11  --  1.14    < > = values in this interval not displayed.     Recent Labs   Lab Test 03/30/25  0616 03/29/25  1139 03/28/25  0320   POTASSIUM 3.6 3.8 3.9   CHLORIDE 100 97* 97*   CO2 24 27 26   BUN 55.7* 33.3* 45.9*    ANIONGAP 12 12 11     Recent Labs   Lab Test 03/29/25  1139 03/28/25  0320 03/27/25  1135 02/26/25  0544 02/08/25  2212 02/05/25 2004 02/02/25  0542 11/22/21  1803 09/03/21  1331 04/11/18  0959 03/29/18  1448   ALBUMIN 3.2* 3.4* 3.7   < >  --    < > 3.2*   < >  --    < >  --    BILITOTAL 0.3 0.4 0.4   < >  --    < > 0.2   < >  --    < >  --    ALT 14 16 22   < >  --    < > 22   < >  --    < >  --    AST 12 11 16   < >  --    < > 12   < >  --    < >  --    PROTEIN  --   --   --   --  70*  --   --   --  100*  --  >499*   LIPASE  --   --   --   --   --   --  15  --   --   --   --     < > = values in this interval not displayed.       I reviewed the patient's new imaging results.    All laboratory data reviewed  All imaging studies reviewed by me.    Demetrius Servin MD,  3/30/2025  [facility]  Office : [phone]  Cell: [phone]      [facility]  Ph: [phone] Fax: [phone]

## 2025-04-01 ENCOUNTER — APPOINTMENT (OUTPATIENT)
Dept: ULTRASOUND IMAGING | Facility: CLINIC | Age: 75
End: 2025-04-01
Attending: STUDENT IN AN ORGANIZED HEALTH CARE EDUCATION/TRAINING PROGRAM
Payer: COMMERCIAL

## 2025-04-01 ENCOUNTER — APPOINTMENT (OUTPATIENT)
Dept: CARDIOLOGY | Facility: CLINIC | Age: 75
DRG: 871 | End: 2025-04-01
Attending: STUDENT IN AN ORGANIZED HEALTH CARE EDUCATION/TRAINING PROGRAM
Payer: COMMERCIAL

## 2025-04-01 LAB
GLUCOSE BLDC GLUCOMTR-MCNC: 101 MG/DL (ref 70–99)
GLUCOSE BLDC GLUCOMTR-MCNC: 111 MG/DL (ref 70–99)
GLUCOSE BLDC GLUCOMTR-MCNC: 125 MG/DL (ref 70–99)
GLUCOSE BLDC GLUCOMTR-MCNC: 131 MG/DL (ref 70–99)
GLUCOSE BLDC GLUCOMTR-MCNC: 159 MG/DL (ref 70–99)
LVEF ECHO: NORMAL

## 2025-04-01 PROCEDURE — 76376 3D RENDER W/INTRP POSTPROCES: CPT | Mod: 26 | Performed by: INTERNAL MEDICINE

## 2025-04-01 PROCEDURE — 76830 TRANSVAGINAL US NON-OB: CPT

## 2025-04-01 PROCEDURE — 94660 CPAP INITIATION&MGMT: CPT

## 2025-04-01 PROCEDURE — 999N000184 HC STATISTIC TELEMETRY

## 2025-04-01 PROCEDURE — 250N000011 HC RX IP 250 OP 636: Performed by: INTERNAL MEDICINE

## 2025-04-01 PROCEDURE — 99233 SBSQ HOSP IP/OBS HIGH 50: CPT | Performed by: STUDENT IN AN ORGANIZED HEALTH CARE EDUCATION/TRAINING PROGRAM

## 2025-04-01 PROCEDURE — 93312 ECHO TRANSESOPHAGEAL: CPT | Mod: 26 | Performed by: INTERNAL MEDICINE

## 2025-04-01 PROCEDURE — 93320 DOPPLER ECHO COMPLETE: CPT | Mod: 26 | Performed by: INTERNAL MEDICINE

## 2025-04-01 PROCEDURE — 250N000013 HC RX MED GY IP 250 OP 250 PS 637: Performed by: STUDENT IN AN ORGANIZED HEALTH CARE EDUCATION/TRAINING PROGRAM

## 2025-04-01 PROCEDURE — 250N000009 HC RX 250: Performed by: INTERNAL MEDICINE

## 2025-04-01 PROCEDURE — 250N000011 HC RX IP 250 OP 636: Performed by: STUDENT IN AN ORGANIZED HEALTH CARE EDUCATION/TRAINING PROGRAM

## 2025-04-01 PROCEDURE — 120N000001 HC R&B MED SURG/OB

## 2025-04-01 PROCEDURE — 93325 DOPPLER ECHO COLOR FLOW MAPG: CPT | Mod: 26 | Performed by: INTERNAL MEDICINE

## 2025-04-01 PROCEDURE — 99232 SBSQ HOSP IP/OBS MODERATE 35: CPT | Performed by: INTERNAL MEDICINE

## 2025-04-01 PROCEDURE — 93325 DOPPLER ECHO COLOR FLOW MAPG: CPT

## 2025-04-01 PROCEDURE — 258N000003 HC RX IP 258 OP 636: Performed by: INTERNAL MEDICINE

## 2025-04-01 PROCEDURE — 999N000157 HC STATISTIC RCP TIME EA 10 MIN

## 2025-04-01 PROCEDURE — 999N000183 HC STATISTIC TEE INCLUDES SEDATION

## 2025-04-01 PROCEDURE — 99152 MOD SED SAME PHYS/QHP 5/>YRS: CPT | Performed by: INTERNAL MEDICINE

## 2025-04-01 RX ORDER — GLYCOPYRROLATE 0.2 MG/ML
0.1 INJECTION, SOLUTION INTRAMUSCULAR; INTRAVENOUS ONCE
Status: COMPLETED | OUTPATIENT
Start: 2025-04-01 | End: 2025-04-01

## 2025-04-01 RX ORDER — NALOXONE HYDROCHLORIDE 0.4 MG/ML
0.2 INJECTION, SOLUTION INTRAMUSCULAR; INTRAVENOUS; SUBCUTANEOUS
Status: DISCONTINUED | OUTPATIENT
Start: 2025-04-01 | End: 2025-04-01

## 2025-04-01 RX ORDER — LIDOCAINE 50 MG/G
OINTMENT TOPICAL ONCE
Status: COMPLETED | OUTPATIENT
Start: 2025-04-01 | End: 2025-04-01

## 2025-04-01 RX ORDER — NALOXONE HYDROCHLORIDE 0.4 MG/ML
0.4 INJECTION, SOLUTION INTRAMUSCULAR; INTRAVENOUS; SUBCUTANEOUS
Status: DISCONTINUED | OUTPATIENT
Start: 2025-04-01 | End: 2025-04-01

## 2025-04-01 RX ORDER — SODIUM CHLORIDE 9 MG/ML
1000 INJECTION, SOLUTION INTRAVENOUS CONTINUOUS
Status: DISCONTINUED | OUTPATIENT
Start: 2025-04-01 | End: 2025-04-01

## 2025-04-01 RX ORDER — DEXTROSE MONOHYDRATE 25 G/50ML
9.5 INJECTION, SOLUTION INTRAVENOUS
Status: DISCONTINUED | OUTPATIENT
Start: 2025-04-01 | End: 2025-04-01

## 2025-04-01 RX ORDER — FENTANYL CITRATE 50 UG/ML
25 INJECTION, SOLUTION INTRAMUSCULAR; INTRAVENOUS
Status: DISCONTINUED | OUTPATIENT
Start: 2025-04-01 | End: 2025-04-01

## 2025-04-01 RX ORDER — LIDOCAINE HYDROCHLORIDE 40 MG/ML
1.5 SOLUTION TOPICAL ONCE
Status: COMPLETED | OUTPATIENT
Start: 2025-04-01 | End: 2025-04-01

## 2025-04-01 RX ORDER — FLUMAZENIL 0.1 MG/ML
0.2 INJECTION, SOLUTION INTRAVENOUS
Status: DISCONTINUED | OUTPATIENT
Start: 2025-04-01 | End: 2025-04-01

## 2025-04-01 RX ADMIN — AMIODARONE HYDROCHLORIDE 200 MG: 200 TABLET ORAL at 21:15

## 2025-04-01 RX ADMIN — SERTRALINE HYDROCHLORIDE 75 MG: 50 TABLET ORAL at 08:54

## 2025-04-01 RX ADMIN — MIDAZOLAM 1 MG: 1 INJECTION INTRAMUSCULAR; INTRAVENOUS at 14:13

## 2025-04-01 RX ADMIN — AMIODARONE HYDROCHLORIDE 200 MG: 200 TABLET ORAL at 08:54

## 2025-04-01 RX ADMIN — ONDANSETRON 4 MG: 2 INJECTION, SOLUTION INTRAMUSCULAR; INTRAVENOUS at 03:23

## 2025-04-01 RX ADMIN — GLYCOPYRROLATE 0.1 MG: 0.2 INJECTION, SOLUTION INTRAMUSCULAR; INTRAVENOUS at 13:39

## 2025-04-01 RX ADMIN — Medication 40 MG: at 05:51

## 2025-04-01 RX ADMIN — FENTANYL CITRATE 50 MCG: 50 INJECTION, SOLUTION INTRAMUSCULAR; INTRAVENOUS at 14:07

## 2025-04-01 RX ADMIN — SEVELAMER CARBONATE 1600 MG: 800 TABLET, FILM COATED ORAL at 16:06

## 2025-04-01 RX ADMIN — ATORVASTATIN CALCIUM 20 MG: 20 TABLET, FILM COATED ORAL at 21:15

## 2025-04-01 RX ADMIN — FENTANYL CITRATE 25 MCG: 50 INJECTION, SOLUTION INTRAMUSCULAR; INTRAVENOUS at 14:13

## 2025-04-01 RX ADMIN — SENNOSIDES 2 TABLET: 8.6 TABLET ORAL at 21:15

## 2025-04-01 RX ADMIN — HYDRALAZINE HYDROCHLORIDE 75 MG: 25 TABLET ORAL at 05:51

## 2025-04-01 RX ADMIN — HYDRALAZINE HYDROCHLORIDE 75 MG: 25 TABLET ORAL at 16:06

## 2025-04-01 RX ADMIN — MIDAZOLAM 2 MG: 1 INJECTION INTRAMUSCULAR; INTRAVENOUS at 14:07

## 2025-04-01 RX ADMIN — SEVELAMER CARBONATE 1600 MG: 800 TABLET, FILM COATED ORAL at 08:54

## 2025-04-01 RX ADMIN — POLYETHYLENE GLYCOL 3350 17 G: 17 POWDER, FOR SOLUTION ORAL at 21:16

## 2025-04-01 RX ADMIN — LIDOCAINE HYDROCHLORIDE 1.5 ML: 40 SOLUTION TOPICAL at 13:37

## 2025-04-01 RX ADMIN — AMLODIPINE BESYLATE 10 MG: 10 TABLET ORAL at 08:54

## 2025-04-01 RX ADMIN — SODIUM CHLORIDE 1000 ML: 0.9 INJECTION, SOLUTION INTRAVENOUS at 13:30

## 2025-04-01 RX ADMIN — HYDRALAZINE HYDROCHLORIDE 75 MG: 25 TABLET ORAL at 21:15

## 2025-04-01 RX ADMIN — Medication 50 MCG: at 08:54

## 2025-04-01 RX ADMIN — TOPICAL ANESTHETIC 1 ML: 200 SPRAY DENTAL; PERIODONTAL at 14:03

## 2025-04-01 ASSESSMENT — ACTIVITIES OF DAILY LIVING (ADL)
ADLS_ACUITY_SCORE: 82

## 2025-04-01 NOTE — PROGRESS NOTES
1325 Pt confused at times. Daughter consented to procedure via phone with Dr. Ortiz. Pt denies difficulty swallowing and had esophogram during this stay because of previous dysphagia, reports sleep apnea and uses CPAP at home. Pt has dentures but those are removed. NPO x 12+ hrs. All questions & concerns addressed.     HSAHNAZ: Pt tolerated well. VSS. Total sedation given- 3 mg Versed & 75 mcg Fentanyl. See SHAHNAZ Flowsheet. I called her daughter, Caroline Gray, after procedure and updated her.    1501 Detailed report called to Faina WONG RN. Pt to be NPO until 1600. Both pt & nurse informed.    1518 Pt transferred to [facility] per cart.  Pt  A/O. Resp even & unlabored upon transfer.

## 2025-04-01 NOTE — PROVIDER NOTIFICATION
MD Notification    Notified Person: MD    Notified Person Name: Dr. Gayle    Notification Date/Time: 3/31/25 1524    Notification Interaction: [facility] Messaging    Purpose of Notification: Requesting for diet order. Plan for SHAHNAZ tomorrow at 2pm. Pt also noted to have notable bleeding from vaginal area last evening and early this morning. Unclear of source.     Orders Received: Renal diet ordered. Provider to follow up tomorrow with patient regarding vaginal bleed.    Comments:

## 2025-04-01 NOTE — PROGRESS NOTES
[id number]  Isolation/Type:n/a  Tests/Procedures for NEXT shift: none scheduled  Consults? (Pending/following, signed-off?) GI signed off, WOC seen, PT, ID following  Where is patient from? (Home, TCU, etc.): TCU [facility]  Other Important info for NEXT shift:  Has L arm fistula, L AKA, HD schedule MWF  - Pt to notify staff when ready for CPAP  - Pt is on calorie count for next 3x days- difficult for accurate track due to pt being NPO for procedures. Back to diet at 1600 today.  - Pt having intermittent vaginal bleed- unclear if urine- abd US completed  Anticipated DC date & active delays: TBD     SUMMARY NOTE:  Orientation/Cognitive: A&Ox3-4  Observation Goals (Met/ Not Met): INP  Mobility Level/Assist Equipment: A2 Lift  Antibiotics & Plan (IV/po, length of tx left): none currently, recently vanco  Pain Management: Denies  Complete Pain Reassessment: Y/N Y Due next: next shift  Tele/VS/O2: on 1-2 L/min today. Unable to wean to RA this shift. Tele: NSR  ABNL Lab/BG: >125>101  Diet: tube feed [id number]  Total TF  Bowel/Bladder: incont  Skin Concerns: Left buttocks open wound- wound care applied  Drains/Devices: PIV SL- TF running @ 65ml/hr  Patient Stated Goal for Today: none

## 2025-04-01 NOTE — PROGRESS NOTES
[facility]  Infectious Disease Progress Note          Assessment and Plan:   Date of Admission:  3/27/2025  Date of Consult (When I saw the patient): 03/28/25        Assessment & Plan  Supriya Herr is a 75 year old female who was admitted on 3/27/2025.      Impression:  76 yo female with a history of ESRD on HD, CHF, DM, HTN, JONE, traumatic left AKA, and obesity who presented with abdominal pain and dry heaves and has since been found to have E. Faecalis bacteremia and urine culture also growing E. Faecalis.      -Afebrile.   -Leukocytosis.   -Blood cultures from 3/27: 4 of 4 with E. Faecalis by verigene testing.   -Urine culture with 50-100K E. Faecalis. Produces very little urine with ESRD. No urinary symptoms.   -CT C/A/P with mild bladder wall thickening, rectal stool ball, no abscess.   -On Meropenem  -Allergy listed to Unasyn and Penicillin (rash).      Recommendations:   High-grade Enterococcus bacteremia, blood cultures all rapidly positive even after start of antibiotics, high probability this is endocarditis  Urine may either be source initially or spillover, imaging without obvious abscess, obstruction etc  transthoracic echocardiogram unremarkable but almost certainly will need transesophageal echocardiogram as endovascular infection a major concern  For now vancomycin,.  Possible rechallenge of penicillin allergy, likely addition of ceftriaxone synergistic treatment.  The transesophageal echocardiogram almost certainly be needed here as the treatment will be dramatically different between endocarditis and simple bacteremia  SHAHNAZ pending            Interval History:   No fever, cultures are pending  SHAHNAZ is pending             Medications:     Current Facility-Administered Medications   Medication Dose Route Frequency Provider Last Rate Last Admin    - MEDICATION INSTRUCTIONS for Dialysis Patients -   Does not apply See Admin Instructions Ekaterian Gayle MD        amiodarone  (PACERONE) tablet 200 mg  200 mg Oral or Feeding Tube BID Ekaterina Gayle MD   200 mg at 04/01/25 0854    amLODIPine (NORVASC) tablet 10 mg  10 mg Oral or Feeding Tube Once per day on Sunday Tuesday Thursday Saturday Ekaterina Gayle MD   10 mg at 04/01/25 0854    [Held by provider] apixaban ANTICOAGULANT (ELIQUIS) tablet 5 mg  5 mg Oral or Feeding Tube BID Ekaterina Gayle MD   5 mg at 03/30/25 2106    atorvastatin (LIPITOR) tablet 20 mg  20 mg Oral or Feeding Tube QPM Ekaterina Gayle MD   20 mg at 03/31/25 2023    calcitRIOL (ROCALTROL) capsule 0.25 mcg  0.25 mcg Oral Once per day on Monday Wednesday Friday Ekaterina Gayle MD   0.25 mcg at 03/31/25 1404    cetirizine (zyrTEC) tablet 10 mg  10 mg Oral or Feeding Tube At Bedtime Ekaterina Gayle MD   10 mg at 03/30/25 2217    hydrALAZINE (APRESOLINE) tablet 75 mg  75 mg Oral or Feeding Tube Q8H Ekaterina Gayle MD   75 mg at 04/01/25 0551    insulin aspart (NovoLOG) injection (RAPID ACTING)  1-7 Units Subcutaneous TID AC Ekaterina Gayle MD   1 Units at 03/30/25 1811    insulin aspart (NovoLOG) injection (RAPID ACTING)  1-5 Units Subcutaneous At Bedtime Ekaterina Gayle MD        [Held by provider] insulin glargine (LANTUS PEN) injection 12 Units  12 Units Subcutaneous BID Ganesh Lawson MD   12 Units at 03/31/25 0800    midodrine (PROAMATINE) tablet 5 mg  5 mg Oral Once per day on Monday Wednesday Friday Ekaterina Gayle MD   5 mg at 03/31/25 0802    pantoprazole (PROTONIX) 2 mg/mL suspension 40 mg  40 mg Per Feeding Tube QAM  Ekaterina Gayle MD   40 mg at 04/01/25 0551    polyethylene glycol (MIRALAX) Packet 17 g  17 g Oral or Feeding Tube BID Ekaterina Gayle MD        [Held by provider] Prosource TF20 ENfit Compatibl EN LIQD (PROSOURCE TF20) packet 60 mL  1 packet Per Feeding Tube Daily Ekaterina Gayle MD   60 mL at 03/31/25 1407    sennosides (SENOKOT) tablet 2 tablet  2 tablet Oral BID Ekaterina Gayle MD   2  "tablet at 03/31/25 1406    sertraline (ZOLOFT) tablet 75 mg  75 mg Oral or Feeding Tube Daily Ekaterina Gayle MD   75 mg at 04/01/25 0854    sevelamer carbonate (RENVELA) tablet 1,600 mg  1,600 mg Oral TID w/meals Ekaterina Gayle MD   1,600 mg at 04/01/25 0854    sodium chloride (PF) 0.9% PF flush 3 mL  3 mL Intracatheter Q8H VÍCTOR Ekaterina Gayle MD   3 mL at 04/01/25 0854    vancomycin place cardoso - receiving intermittent dosing  1 each Intravenous See Admin Instructions Ekaterina Gayle MD        vitamin D3 (CHOLECALCIFEROL) tablet 50 mcg  50 mcg Oral or Feeding Tube Daily Ekaterina Gayle MD   50 mcg at 04/01/25 0854                  Physical Exam:   Blood pressure (!) 155/45, pulse 70, temperature 98.4  F (36.9  C), temperature source Oral, resp. rate 16, height 1.676 m (5' 6\"), weight 94 kg (207 lb 3.7 oz), SpO2 100%, not currently breastfeeding.  Wt Readings from Last 2 Encounters:   03/31/25 94 kg (207 lb 3.7 oz)   03/25/25 92.5 kg (204 lb)     Vital Signs with Ranges  Temp:  [98.1  F (36.7  C)-98.5  F (36.9  C)] 98.4  F (36.9  C)  Pulse:  [58-74] 70  Resp:  [16-39] 16  BP: ()/(38-75) 155/45  SpO2:  [90 %-100 %] 100 %    Constitutional: Awake, alert, cooperative, no apparent distress      Lungs: Clear to auscultation bilaterally, no crackles or wheezing   Cardiovascular: Regular rate and rhythm, normal S1 and S2, and no murmur noted   Abdomen: Normal bowel sounds, soft, non-distended, non-tender   Skin: No rashes, no cyanosis, no edema   Other: Looks chronically more than acutely ill, no embolic lesions            Data:   All microbiology laboratory data reviewed.  Recent Labs   Lab Test 03/31/25  0731 03/30/25  2222 03/30/25  0616 03/29/25  1139   WBC 7.9  --  7.3 9.9   HGB 8.3* 7.6* 7.6* 7.9*   HCT 25.8*  --  23.9* 25.9*   MCV 94  --  96 97     --  189 196     Recent Labs   Lab Test 03/31/25  0731 03/30/25  0616 03/29/25  1139   CR 5.00* 3.60* 2.65*     Recent Labs   Lab Test " 06/03/24  1644   *     Recent Labs   Lab Test 03/19/21  0900 06/24/18  1100 06/23/18  2348 06/23/18  2347 06/18/18  0949 05/19/18  0119 05/19/18  0056 05/19/18  0022 11/02/17  0600   CULT Light growth  Normal skin shree    Light growth  Streptococcus agalactiae sero group B  This organism is susceptible to ampicillin, penicillin, vancomycin and the cephalosporins.   If treatment is required AND your patient is allergic to penicillin, contact the   Microbiology Lab within 5 days to request susceptibility testing.  *  Moderate growth  Candida albicans  Susceptibility testing not routinely done  * Moderate growth  Corynebacterium striatum  Identification obtained by MALDI-TOF mass spectrometry research use only database. Test   characteristics determined and verified by the Infectious Diseases Diagnostic Laboratory   ([facility]) [place], [place].  *  Light growth  Staphylococcus aureus  *  Single colony  Beta hemolytic Streptococcus group B  *  Light growth  Gram positive bacilli resembling diphtheroids  No further identification  Susceptibility testing not routinely done  *  Susceptibility testing requested by  Dr. Chen for routine sensitivities on the Corynebacterium striatum on 6.26.18. ME.    Moderate growth  Staphylococcus aureus  Susceptibility testing done on previous specimen  *  Moderate growth  Corynebacterium striatum  Identification obtained by MALDI-TOF mass spectrometry research use only database. Test   characteristics determined and verified by the Infectious Diseases Diagnostic Laboratory   ([facility]) [place], [place].  Susceptibility testing not routinely done  *  Incorrectly ordered by PCU/Clinic  Canceled, Test credited  See Accesion number [id number] No growth No growth Moderate growth  Beta hemolytic Streptococcus group C  *  Moderate growth  Staphylococcus aureus  *  Light growth  Normal skin shree   No growth Moderate growth  Acinetobacter baumannii complex  *  Heavy growth  Beta  hemolytic Streptococcus group C  *  Moderate growth  Escherichia coli  *  Moderate growth  Beta hemolytic Streptococcus group B  * No growth No growth

## 2025-04-01 NOTE — PROGRESS NOTES
"[facility]    Medicine Progress Note - Hospitalist Service    Date of Admission:  3/27/2025    Assessment & Plan      Supriya Herr is a 75 year old female with very complex past medical history including end-stage renal disease on hemodialysis, HFpEF, paroxysmal atrial fibrillation, hypertension, hyperlipidemia, anxiety, depression, chronic anemia, dysphagia status post PEG tube, type 2 diabetes, left traumatic AKA and recent hospitalization with multifactorial respiratory failure now being admitted on 3/27/2025 with abdominal pain and retching. She is found to have e/o constipation. But other evaluation notable for elevated WBC, procal and CRP of unclear etiology.     Pf note, pt with multiple recent hospitalizations. Per last discharge summary:   \"Hospitalized at [facility] from 3/2 - 3/6/2025 with shortness of breath likely multifactorial.  Volume status was difficult to determine, underwent right heart cath on 3/5 showed mildly elevated right atrial and PCWP readings.  Was treated for acute respiratory failure likely from heart failure exacerbation, atrial fibrillation, end-stage renal disease and discharged to care facility with supplemental nocturnal oxygen.\"  \"Prolonged hospitalized at [facility] from 1/8 to 2/26/2025 for acute hypoxic respiratory failure multifactorial, was intubated ( 1/9-1/18), reintubated (1/20 - 1/25).  Then was treated for shock with pressors, influenza A pneumonia, hospital-acquired pneumonia with intravenous antibiotics, steroids, antivirals.  Then also noted to have heart failure exacerbation, recurrent A-fib with RVR, subsequent bradycardia.  Then also noted to have encephalopathy likely from infectious, metabolic etiology and delirium.  During that hospitalization was noted to have dysphagia, pharyngeal edema, was evaluated by ENT on 2/16.  No findings to explain dysphagia.  G-tube was placed by IR on 2/17 and was on nocturnal tube feeds.  Discharged to TCU for " "rehabilitation.\"    E. Faecalis bacteremia  Leukocytosis  Elevated procalcitonin   Elevated CRP  On admission, WBC elevated to 17.4, Procal elevated to 1.3, and CRP elevated to 39.78.   Unclear infectious source. She has some mild non-specific urinary bladder wall thickening, but with ESRD makes very little urine. Also has a new MIKAYLA 3mm nodule which may be infectious or inflammatory, but no respiratory complaints.   * COVID, flu RSV pending   *UA very abnormal but difficult to interpret in the setting of ESRD. Ultimately Ucx positive for E faecalis. Could be initial source or seeding from bacteremia.  * Bcx positive for E. Faecalis. Unclear source. Concern for endocarditis given how quickly blood cultures are coming back positive  *TTE without vegetation seen.  * TTE completed 4/1, results pending   - ID consulted for assistance   - Continue IV vancomycin   - Repeat Bcx until cleared, no growth since 3/29   - Final abx plan to be determined     Abdominal pain  Retching, with Hx of GERD   Dysphagia s/p PEG tube (2/1/2025)   Constipation, rectal stool ball   Pt presents to the ED with vague complaints of retching/dry heaving without vomiting and some crampy lower abdominal pain.   * CT scan showed large rectal stool ball without evidence of obstruction. There was also some non-specific mild urinary bladder wall thickening. No other intra-abdominal pathology.   - Unclear exactly what is going on. Pt did not have any retching or other symptoms while I was in the room. Perhaps she has some reflux of tube feeds or other esophageal issue.  - GI consulted for assessment:   - EGD on 3/30 without etiology to explain dysphagia   - XR Esophagram normal  - Continue Protonix 40 mg daily  - Continue bowel regimen to work on the constipation   - Calorie counts so we can try to wean off tube feeds if able  - Zofran available PRN    End-stage renal disease on HD   Dialyzes MWF via L arm fistula. Per prior notes, volume removal has " often been limited by intra dialytic hypotension and a fib with RVR   *Noted that patient has been getting short of breath on Sundays in the past and there was discussion of adding a fourth dialysis day on Saturdays. As of now, nephrology challenging dry weight to see if that helps.  - Nephrology consulted   - Continue PTA midodrine MWF before dialysis   - Continue PTA calcitriol, renvela, vit D.     Heart failure with preserved ejection fraction  Markedly elevated BNP without clinical features of exacerbation   Paroxysmal atrial fibrillation on chronic anticoagulation  Hypertension  Hyperlipidemia  Troponin elevation, Chronic Non-ischemic Myocardial Injury due to chronic heart failure with preserved ejection fraction and end-stage renal disease (85->85), no treatment indicated   [PTA medications include: amiodarone 200mg BID, amlodipine 10mg daily, Apixaban 5mg BID, atorvastatin 20mg daily, hydralazine 75mg Q8H,   *TTE 1/21/2025 with LVEF of 60%.  No valvular abnormality   * RHC 3/5/25 showing moderately elevated right and left-sided filling pressure; elevated wedge pressure consistent with pulmonary hypertension  * On admission now, pt has markedly elevated BNP to 51,544 (though historically is in the 20,000-40,000 range), She does have small pleural effusions on CT, but no other overt evidence of volume overload or clinical HF. She is satting well on room air.  - I/Os and daily weights ordered  - continue PTA apixaban 5 mg BID  - continue PTA atorvastatin 20 mg daily  - continue PTA hydralazine 75 mg q8H  - continue PTA amiodarone 200 mg BID  - continue PTA amlodipine 10 m qday  - Volume management per nephrology  - Avoid using supplemental oxygen unless patient is satting <90% on room air.    Vaginal bleeding   Pt has had intermittent vaginal bleeding this stay. Nursing and pt quite convinced it is in fact vaginal rather than rectal or urethral.   - transvaginal ultrasound ordered for further eval.  "    Anxiety/depression/insomnia  - Continue PTA zoloft     Anemia of chronic disease  Hemoglobin 9.2 on admission, stable at 8.3 now  - Monitor CBC       Type II DM  - Continue PTA lantus 15 units BID   - MDSSI     Physical deconditioning from medical illness, senile frailty.  S/p left above-knee amputation, traumatic  Patient from TCU, daughter hopeful pt will be able to discharge back home with services rather than going back to TCU.   - PT/OT consulted      Sacrococcygeal Pressure injury, Stage 3, present on admission  Wound care team followed.  Pressure injury prevention.      Obesity with a BMI of 34.  Increase all-cause mortality and morbidity.          Diet: Calorie Counts  Adult Formula Drip Feeding: Continuous UpDown Renal Support; Gastrostomy; Goal Rate: 65; mL/hr; From: 6:00 PM; To: 6:00 AM; 3/31: Decrease cycle time from 14 hrs to 12 hrs (6:00 pm to 6:00 am)  NPO for Medical/Clinical Reasons Except for: Meds, Ice Chips    DVT Prophylaxis: DOAC  Bradshaw Catheter: Not present  Lines: None     Cardiac Monitoring: None  Code Status: No CPR- Pre-arrest intubation OK      Clinically Significant Risk Factors          # Hypochloremia: Lowest Cl = 96 mmol/L in last 2 days, will monitor as appropriate      # Hypoalbuminemia: Lowest albumin = 3.2 g/dL at 3/29/2025 11:39 AM, will monitor as appropriate    # Coagulation Defect: INR = 1.47 (Ref range: 0.85 - 1.15) and/or PTT = 54 Seconds (Ref range: 22 - 38 Seconds), will monitor for bleeding    # Hypertension: Noted on problem list  # Chronic heart failure with preserved ejection fraction: heart failure noted on problem list and last echo with EF >50%          # DMII: A1C = 6.5 % (Ref range: <5.7 %) within past 6 months   # Obesity: Estimated body mass index is 33.45 kg/m  as calculated from the following:    Height as of this encounter: 1.676 m (5' 6\").    Weight as of this encounter: 94 kg (207 lb 3.7 oz).        # Financial/Environmental Concerns:       "     Social Drivers of Health    Tobacco Use: Medium Risk (3/30/2025)    Patient History     Smoking Tobacco Use: Former     Smokeless Tobacco Use: Never   Physical Activity: Insufficiently Active (7/19/2024)    Exercise Vital Sign     Days of Exercise per Week: 1 day     Minutes of Exercise per Session: 10 min   Social Connections: Unknown (7/19/2024)    Social Connection and Isolation Panel [NHANES]     Frequency of Social Gatherings with Friends and Family: More than three times a week          Disposition Plan     Medically Ready for Discharge: Anticipated in 2-4 Days pending evaluation of bacteremia             Ekaterina Gayle MD  Hospitalist Service  [facility]  Securely message with [employer] (more info)  Text page via [employer] Paging/Directory   ______________________________________________________________________    Interval History   Pt seen after SHAHNAZ. Tolerated well. No acute concerns. NO CP or shortness of breath.       Physical Exam   Vital Signs: Temp: 98.4  F (36.9  C) Temp src: Oral BP: (!) 156/62 Pulse: 63   Resp: 16 SpO2: 94 % O2 Device: Nasal cannula Oxygen Delivery: 2 LPM  Weight: 207 lbs 3.72 oz  Constitutional: Awake, alert, cooperative, no apparent distress.  Appears chronically ill.  Pulmonary: No increased work of breathing, good air exchange, clear to auscultation bilaterally, no crackles or wheezing.  Cardiovascular: Regular rate and rhythm, normal S1 and S2, no S3 or S4. No murmurs, rubs, or gallops noted.  GI: Normal bowel sounds, soft, non-distended, mild tenderness of abdomen generally.  PEG tube in place. No guarding or rebound.  Skin/Integumen: No cyanosis or jaundice. No rashes noted.  Extremities: No lower extremity edema. L AKA.   Neuro: A&Ox4. Conversant. Responds appropriately in conversation. Moves all extremities with no focal deficit identified.      Medical Decision Making     45 MINUTES SPENT BY ME on the date of service doing chart review, history,  exam, documentation & further activities per the note.      Data   ------------------------- PAST 24 HR DATA REVIEWED -----------------------------------------------        Imaging results reviewed over the past 24 hrs:   No results found for this or any previous visit (from the past 24 hours).

## 2025-04-01 NOTE — PLAN OF CARE
Orientation: A&Ox4    Vitals/Tele: BP HTN 160s. OVSS RA. Denies pain. Tele: SR.    IV Access/drains: PIV SL    Diet: NPO, TF stopped at [place] for SHAHNAZ today.    Mobility: A2 lift. T/rq2.     GI/: Incontinent B&B, no BM this shift.     Wound/Skin: Scab to L stump, PROSPER. Wound to L buttocks, PROSPER. Barrier cream in place.     Consults: GI s/o. WOC following.     Discharge Plan: SHAHNAZ today. Discharge to TCU [facility] pending.       See Flow sheets for assessment

## 2025-04-01 NOTE — PROGRESS NOTES
[id number]     PRIMARY Concern: Abd pain/ Gen. Weakness  SAFETY RISK Concerns (fall risk, behaviors, etc.): Fall risk  Aggression Tool Color: Green  Isolation/Type:n/a  Tests/Procedures for NEXT shift: Plan for SHAHNAZ 4/1/25,   Consults? (Pending/following, signed-off?) GI signed off, WOC seen, PT, ID following  Where is patient from? (Home, TCU, etc.): TCU [facility]  Other Important info for NEXT shift:  Has L arm fistula, L AKA, HD schedule MWF  - Pt to notify staff when ready for CPAP  - Pt is on calorie count for next 3x days- difficult for accurate track due to pt being NPO for procedures  - Pt having intermittent vaginal bleed- unclear if urine- MD notified  Anticipated DC date & active delays: TBD     SUMMARY NOTE:  Orientation/Cognitive: A&Ox3- disoriented to time  Observation Goals (Met/ Not Met): INP  Mobility Level/Assist Equipment: A2 Lift  Antibiotics & Plan (IV/po, length of tx left): Vanco IV  Pain Management: Denies  Complete Pain Reassessment: Y/N Y Due next: next shift  Tele/VS/O2: VSS on RA- intermittently drops to 88% but rebounds to low 90's Tele: NSR  ABNL Lab/BG:   Diet: NPO Midnight- stop tube feeding at midnight  Total TF  Bowel/Bladder: incont  Skin Concerns: Left buttocks open wound- wound care applied  Drains/Devices: PIV SL- TF running @ 65ml/hr- to be stopped at midnight  Patient Stated Goal for Today: none

## 2025-04-02 ENCOUNTER — PATIENT OUTREACH (OUTPATIENT)
Dept: GERIATRIC MEDICINE | Facility: CLINIC | Age: 75
End: 2025-04-02

## 2025-04-02 ENCOUNTER — TELEPHONE (OUTPATIENT)
Dept: CARDIOLOGY | Facility: CLINIC | Age: 75
End: 2025-04-02

## 2025-04-02 ENCOUNTER — ENROLLMENT (OUTPATIENT)
Dept: HOME HEALTH SERVICES | Facility: HOME HEALTH | Age: 75
End: 2025-04-02
Payer: COMMERCIAL

## 2025-04-02 LAB
ANION GAP SERPL CALCULATED.3IONS-SCNC: 13 MMOL/L (ref 7–15)
BUN SERPL-MCNC: 55.4 MG/DL (ref 8–23)
CALCIUM SERPL-MCNC: 10.1 MG/DL (ref 8.8–10.4)
CHLORIDE SERPL-SCNC: 95 MMOL/L (ref 98–107)
CREAT SERPL-MCNC: 3.95 MG/DL (ref 0.51–0.95)
EGFRCR SERPLBLD CKD-EPI 2021: 11 ML/MIN/1.73M2
ERYTHROCYTE [DISTWIDTH] IN BLOOD BY AUTOMATED COUNT: 15.3 % (ref 10–15)
GLUCOSE BLDC GLUCOMTR-MCNC: 109 MG/DL (ref 70–99)
GLUCOSE BLDC GLUCOMTR-MCNC: 123 MG/DL (ref 70–99)
GLUCOSE BLDC GLUCOMTR-MCNC: 163 MG/DL (ref 70–99)
GLUCOSE BLDC GLUCOMTR-MCNC: 177 MG/DL (ref 70–99)
GLUCOSE BLDC GLUCOMTR-MCNC: 180 MG/DL (ref 70–99)
GLUCOSE BLDC GLUCOMTR-MCNC: 258 MG/DL (ref 70–99)
GLUCOSE SERPL-MCNC: 257 MG/DL (ref 70–99)
HBV SURFACE AG SERPL QL IA: NONREACTIVE
HCO3 SERPL-SCNC: 27 MMOL/L (ref 22–29)
HCT VFR BLD AUTO: 24.7 % (ref 35–47)
HGB BLD-MCNC: 7.9 G/DL (ref 11.7–15.7)
MCH RBC QN AUTO: 29.9 PG (ref 26.5–33)
MCHC RBC AUTO-ENTMCNC: 32 G/DL (ref 31.5–36.5)
MCV RBC AUTO: 94 FL (ref 78–100)
PLATELET # BLD AUTO: 187 10E3/UL (ref 150–450)
POTASSIUM SERPL-SCNC: 4.4 MMOL/L (ref 3.4–5.3)
RBC # BLD AUTO: 2.64 10E6/UL (ref 3.8–5.2)
SODIUM SERPL-SCNC: 135 MMOL/L (ref 135–145)
VANCOMYCIN SERPL-MCNC: 22.4 UG/ML
WBC # BLD AUTO: 8.3 10E3/UL (ref 4–11)

## 2025-04-02 PROCEDURE — 90935 HEMODIALYSIS ONE EVALUATION: CPT | Performed by: INTERNAL MEDICINE

## 2025-04-02 PROCEDURE — 120N000001 HC R&B MED SURG/OB

## 2025-04-02 PROCEDURE — 99232 SBSQ HOSP IP/OBS MODERATE 35: CPT | Performed by: INTERNAL MEDICINE

## 2025-04-02 PROCEDURE — 85027 COMPLETE CBC AUTOMATED: CPT | Performed by: STUDENT IN AN ORGANIZED HEALTH CARE EDUCATION/TRAINING PROGRAM

## 2025-04-02 PROCEDURE — 250N000011 HC RX IP 250 OP 636: Performed by: INTERNAL MEDICINE

## 2025-04-02 PROCEDURE — 80048 BASIC METABOLIC PNL TOTAL CA: CPT | Performed by: STUDENT IN AN ORGANIZED HEALTH CARE EDUCATION/TRAINING PROGRAM

## 2025-04-02 PROCEDURE — 87340 HEPATITIS B SURFACE AG IA: CPT | Performed by: INTERNAL MEDICINE

## 2025-04-02 PROCEDURE — 80202 ASSAY OF VANCOMYCIN: CPT | Performed by: STUDENT IN AN ORGANIZED HEALTH CARE EDUCATION/TRAINING PROGRAM

## 2025-04-02 PROCEDURE — 99233 SBSQ HOSP IP/OBS HIGH 50: CPT | Performed by: STUDENT IN AN ORGANIZED HEALTH CARE EDUCATION/TRAINING PROGRAM

## 2025-04-02 PROCEDURE — 90937 HEMODIALYSIS REPEATED EVAL: CPT

## 2025-04-02 PROCEDURE — 258N000003 HC RX IP 258 OP 636: Performed by: INTERNAL MEDICINE

## 2025-04-02 PROCEDURE — 250N000013 HC RX MED GY IP 250 OP 250 PS 637: Performed by: STUDENT IN AN ORGANIZED HEALTH CARE EDUCATION/TRAINING PROGRAM

## 2025-04-02 PROCEDURE — 36415 COLL VENOUS BLD VENIPUNCTURE: CPT | Performed by: STUDENT IN AN ORGANIZED HEALTH CARE EDUCATION/TRAINING PROGRAM

## 2025-04-02 PROCEDURE — 634N000001 HC RX 634: Mod: JZ | Performed by: INTERNAL MEDICINE

## 2025-04-02 RX ORDER — ALBUMIN (HUMAN) 12.5 G/50ML
50 SOLUTION INTRAVENOUS
Status: DISCONTINUED | OUTPATIENT
Start: 2025-04-02 | End: 2025-04-04

## 2025-04-02 RX ORDER — HEPARIN SODIUM 1000 [USP'U]/ML
500 INJECTION, SOLUTION INTRAVENOUS; SUBCUTANEOUS CONTINUOUS
Status: DISCONTINUED | OUTPATIENT
Start: 2025-04-02 | End: 2025-04-04

## 2025-04-02 RX ADMIN — SODIUM CHLORIDE 200 ML: 0.9 INJECTION, SOLUTION INTRAVENOUS at 10:42

## 2025-04-02 RX ADMIN — EPOETIN ALFA-EPBX 10000 UNITS: 10000 INJECTION, SOLUTION INTRAVENOUS; SUBCUTANEOUS at 10:41

## 2025-04-02 RX ADMIN — INSULIN GLARGINE 12 UNITS: 100 INJECTION, SOLUTION SUBCUTANEOUS at 20:10

## 2025-04-02 RX ADMIN — HYDRALAZINE HYDROCHLORIDE 75 MG: 25 TABLET ORAL at 13:28

## 2025-04-02 RX ADMIN — HEPARIN SODIUM 500 UNITS/HR: 1000 INJECTION, SOLUTION INTRAVENOUS; SUBCUTANEOUS at 10:42

## 2025-04-02 RX ADMIN — SERTRALINE HYDROCHLORIDE 75 MG: 50 TABLET ORAL at 13:29

## 2025-04-02 RX ADMIN — IRON SUCROSE 50 MG: 20 INJECTION, SOLUTION INTRAVENOUS at 10:42

## 2025-04-02 RX ADMIN — SEVELAMER CARBONATE 1600 MG: 800 TABLET, FILM COATED ORAL at 18:28

## 2025-04-02 RX ADMIN — HEPARIN SODIUM 500 UNITS: 1000 INJECTION, SOLUTION INTRAVENOUS; SUBCUTANEOUS at 10:42

## 2025-04-02 RX ADMIN — CETIRIZINE HYDROCHLORIDE 10 MG: 10 TABLET, FILM COATED ORAL at 21:57

## 2025-04-02 RX ADMIN — HYDRALAZINE HYDROCHLORIDE 75 MG: 25 TABLET ORAL at 20:08

## 2025-04-02 RX ADMIN — SODIUM CHLORIDE 250 ML: 0.9 INJECTION, SOLUTION INTRAVENOUS at 10:42

## 2025-04-02 RX ADMIN — ATORVASTATIN CALCIUM 20 MG: 20 TABLET, FILM COATED ORAL at 20:06

## 2025-04-02 RX ADMIN — ACETAMINOPHEN 650 MG: 325 TABLET, FILM COATED ORAL at 20:06

## 2025-04-02 RX ADMIN — AMIODARONE HYDROCHLORIDE 200 MG: 200 TABLET ORAL at 13:29

## 2025-04-02 RX ADMIN — Medication 50 MCG: at 13:28

## 2025-04-02 RX ADMIN — Medication 40 MG: at 13:27

## 2025-04-02 RX ADMIN — CALCITRIOL CAPSULES 0.25 MCG 0.25 MCG: 0.25 CAPSULE ORAL at 13:29

## 2025-04-02 RX ADMIN — AMIODARONE HYDROCHLORIDE 200 MG: 200 TABLET ORAL at 20:06

## 2025-04-02 ASSESSMENT — ACTIVITIES OF DAILY LIVING (ADL)
ADLS_ACUITY_SCORE: 82
ADLS_ACUITY_SCORE: 82
ADLS_ACUITY_SCORE: 87
ADLS_ACUITY_SCORE: 74
ADLS_ACUITY_SCORE: 82
ADLS_ACUITY_SCORE: 82
ADLS_ACUITY_SCORE: 87
ADLS_ACUITY_SCORE: 87
ADLS_ACUITY_SCORE: 82
ADLS_ACUITY_SCORE: 74
ADLS_ACUITY_SCORE: 82
ADLS_ACUITY_SCORE: 87
ADLS_ACUITY_SCORE: 82

## 2025-04-02 NOTE — PROGRESS NOTES
[facility]  Infectious Disease Progress Note          Assessment and Plan:   Date of Admission:  3/27/2025  Date of Consult (When I saw the patient): 03/28/25        Assessment & Plan  Supriya Herr is a 75 year old female who was admitted on 3/27/2025.      Impression:  74 yo female with a history of ESRD on HD, CHF, DM, HTN, JONE, traumatic left AKA, and obesity who presented with abdominal pain and dry heaves and has since been found to have E. Faecalis bacteremia and urine culture also growing E. Faecalis.      -Afebrile.   -Leukocytosis.   -Blood cultures from 3/27: 4 of 4 with E. Faecalis by verigene testing.   -Urine culture with 50-100K E. Faecalis. Produces very little urine with ESRD. No urinary symptoms.   -CT C/A/P with mild bladder wall thickening, rectal stool ball, no abscess.   -On Meropenem  -Allergy listed to Unasyn and Penicillin (rash).     Fortunately SHAHNAZ negative : There is no evidence of a mass or vegetation on this good quality SHAHNAZ      Recommendations:   High-grade Enterococcus bacteremia  SHAHNAZ negative   Urine may either be source initially or spillover, imaging without obvious abscess, obstruction etc  Will plan on treating with vancomycin with dialysis 6 weeks course followed by repeat blood culture   Will discuss with Nephrology the plan for outpt dialysis antibiotics           Interval History:   No fever, cultures are pending  SHAHNAZ is negative              Medications:     Current Facility-Administered Medications   Medication Dose Route Frequency Provider Last Rate Last Admin    - MEDICATION INSTRUCTIONS for Dialysis Patients -   Does not apply See Admin Instructions Ekaterina Gayle MD        amiodarone (PACERONE) tablet 200 mg  200 mg Oral or Feeding Tube BID Ekaterina Gayle MD   200 mg at 04/01/25 2115    amLODIPine (NORVASC) tablet 10 mg  10 mg Oral or Feeding Tube Once per day on Sunday Tuesday Thursday Saturday Ekaterina Gayle MD   10 mg at  04/01/25 0854    [Held by provider] apixaban ANTICOAGULANT (ELIQUIS) tablet 5 mg  5 mg Oral or Feeding Tube BID Ekaterina Gayle MD   5 mg at 03/30/25 2106    atorvastatin (LIPITOR) tablet 20 mg  20 mg Oral or Feeding Tube QPM Ekaterina Gayle MD   20 mg at 04/01/25 2115    calcitRIOL (ROCALTROL) capsule 0.25 mcg  0.25 mcg Oral Once per day on Monday Wednesday Friday Ekaterina Gayle MD   0.25 mcg at 03/31/25 1404    cetirizine (zyrTEC) tablet 10 mg  10 mg Oral or Feeding Tube At Bedtime Ekaterina Gayle MD   10 mg at 03/30/25 2217    epoetin candy-epbx (RETACRIT) injection 10,000 Units  10,000 Units Intravenous Once in dialysis/CRRT Braden Oneill MD        heparin (porcine) injection  500 Units Hemodialysis Machine OR IV Push Once in dialysis/CRRT Braden Oneill MD        hydrALAZINE (APRESOLINE) tablet 75 mg  75 mg Oral or Feeding Tube Q8H Ekaterina Gayle MD   75 mg at 04/01/25 2115    insulin aspart (NovoLOG) injection (RAPID ACTING)  1-7 Units Subcutaneous TID AC Ekaterina Gayle MD   1 Units at 03/30/25 1811    insulin aspart (NovoLOG) injection (RAPID ACTING)  1-5 Units Subcutaneous At Bedtime Ekaterina Gayle MD        insulin glargine (LANTUS PEN) injection 12 Units  12 Units Subcutaneous BID Ekaterina Gayle MD   12 Units at 03/31/25 0800    iron sucrose (VENOFER) injection 50 mg  50 mg Intravenous Once in dialysis/CRRT Braden Oneill MD        midodrine (PROAMATINE) tablet 5 mg  5 mg Oral Once per day on Monday Wednesday Friday Ekaterina Gayle MD   5 mg at 03/31/25 0802    pantoprazole (PROTONIX) 2 mg/mL suspension 40 mg  40 mg Per Feeding Tube QAM AC Ekaterina Gayle MD   40 mg at 04/01/25 0551    polyethylene glycol (MIRALAX) Packet 17 g  17 g Oral or Feeding Tube BID Ekaterina Gayle MD   17 g at 04/01/25 2116    [Held by provider] Prosource TF20 ENfit Compatibl EN LIQD (PROSOURCE TF20) packet 60 mL  1 packet Per Feeding Tube Daily Nalini  "Ekaterina GARDNER MD   60 mL at 03/31/25 1407    sennosides (SENOKOT) tablet 2 tablet  2 tablet Oral BID Ekaterina Gayle MD   2 tablet at 04/01/25 2115    sertraline (ZOLOFT) tablet 75 mg  75 mg Oral or Feeding Tube Daily Ekaterina Gayle MD   75 mg at 04/01/25 0854    sevelamer carbonate (RENVELA) tablet 1,600 mg  1,600 mg Oral TID w/meals Ekaterina Gayle MD   1,600 mg at 04/01/25 1606    sodium chloride (PF) 0.9% PF flush 3 mL  3 mL Intracatheter Q8H VÍCTOR Ekaterina Gayle MD   3 mL at 04/01/25 2116    sodium chloride 0.9% BOLUS 200 mL  200 mL Hemodialysis Machine Once Braden Oneill MD        sodium chloride 0.9% BOLUS 250 mL  250 mL Intravenous Once in dialysis/CRRT Braden Oneill MD        sodium chloride 0.9% BOLUS 500 mL  500 mL Hemodialysis Machine Once Braden Oneill MD        vancomycin place cardoso - receiving intermittent dosing  1 each Intravenous See Admin Instructions Ekaterina Gayle MD        vitamin D3 (CHOLECALCIFEROL) tablet 50 mcg  50 mcg Oral or Feeding Tube Daily Ekaterina Gayle MD   50 mcg at 04/01/25 0854                  Physical Exam:   Blood pressure (!) 167/55, pulse 70, temperature 98.3  F (36.8  C), temperature source Oral, resp. rate 18, height 1.676 m (5' 6\"), weight 93.8 kg (206 lb 14.4 oz), SpO2 99%, not currently breastfeeding.  Wt Readings from Last 2 Encounters:   04/02/25 93.8 kg (206 lb 14.4 oz)   03/25/25 92.5 kg (204 lb)     Vital Signs with Ranges  Temp:  [98.3  F (36.8  C)-98.5  F (36.9  C)] 98.3  F (36.8  C)  Pulse:  [63-75] 70  Resp:  [12-28] 18  BP: (130-196)/() 167/55  SpO2:  [92 %-100 %] 99 %    Constitutional: Awake, alert, cooperative, no apparent distress      Lungs: Clear to auscultation bilaterally, no crackles or wheezing   Cardiovascular: Regular rate and rhythm, normal S1 and S2, and no murmur noted   Abdomen: Normal bowel sounds, soft, non-distended, non-tender   Skin: No rashes, no cyanosis, no edema   Other: " Looks chronically more than acutely ill, no embolic lesions            Data:   All microbiology laboratory data reviewed.  Recent Labs   Lab Test 04/02/25  0759 03/31/25  0731 03/30/25  2222 03/30/25  0616   WBC 8.3 7.9  --  7.3   HGB 7.9* 8.3* 7.6* 7.6*   HCT 24.7* 25.8*  --  23.9*   MCV 94 94  --  96    209  --  189     Recent Labs   Lab Test 04/02/25  0759 03/31/25  0731 03/30/25  0616   CR 3.95* 5.00* 3.60*     Recent Labs   Lab Test 06/03/24  1644   *     Recent Labs   Lab Test 03/19/21  0900 06/24/18  1100 06/23/18  2348 06/23/18  2347 06/18/18  0949 05/19/18  0119 05/19/18  0056 05/19/18  0022 11/02/17  0600   CULT Light growth  Normal skin shree    Light growth  Streptococcus agalactiae sero group B  This organism is susceptible to ampicillin, penicillin, vancomycin and the cephalosporins.   If treatment is required AND your patient is allergic to penicillin, contact the   Microbiology Lab within 5 days to request susceptibility testing.  *  Moderate growth  Candida albicans  Susceptibility testing not routinely done  * Moderate growth  Corynebacterium striatum  Identification obtained by MALDI-TOF mass spectrometry research use only database. Test   characteristics determined and verified by the Infectious Diseases Diagnostic Laboratory   ([facility]) [place], [place].  *  Light growth  Staphylococcus aureus  *  Single colony  Beta hemolytic Streptococcus group B  *  Light growth  Gram positive bacilli resembling diphtheroids  No further identification  Susceptibility testing not routinely done  *  Susceptibility testing requested by  Dr. Chen for routine sensitivities on the Corynebacterium striatum on 6.26.18. [place].    Moderate growth  Staphylococcus aureus  Susceptibility testing done on previous specimen  *  Moderate growth  Corynebacterium striatum  Identification obtained by MALDI-TOF mass spectrometry research use only database. Test   characteristics determined and verified by the  Infectious Diseases Diagnostic Laboratory   ([facility]) [place], [place].  Susceptibility testing not routinely done  *  Incorrectly ordered by PCU/Clinic  Canceled, Test credited  See Accesion number [id number] No growth No growth Moderate growth  Beta hemolytic Streptococcus group C  *  Moderate growth  Staphylococcus aureus  *  Light growth  Normal skin shree   No growth Moderate growth  Acinetobacter baumannii complex  *  Heavy growth  Beta hemolytic Streptococcus group C  *  Moderate growth  Escherichia coli  *  Moderate growth  Beta hemolytic Streptococcus group B  * No growth No growth

## 2025-04-02 NOTE — PROGRESS NOTES
"[facility]    Medicine Progress Note - Hospitalist Service    Date of Admission:  3/27/2025    Assessment & Plan      Supriya Herr is a 75 year old female with very complex past medical history including end-stage renal disease on hemodialysis, HFpEF, paroxysmal atrial fibrillation, hypertension, hyperlipidemia, anxiety, depression, chronic anemia, dysphagia status post PEG tube, type 2 diabetes, left traumatic AKA and recent hospitalization with multifactorial respiratory failure now being admitted on 3/27/2025 with abdominal pain and retching. She is found to have e/o constipation. But other evaluation notable for elevated WBC, procal and CRP of unclear etiology.     Pf note, pt with multiple recent hospitalizations. Per last discharge summary:   \"[facility] 3[facility]/2025 with shortness of breath likely multifactorial.  Volume status was difficult to determine, underwent right heart cath on 3/5 showed mildly elevated right atrial and PCWP readings.  Was treated for acute respiratory failure likely from heart failure exacerbation, atrial fibrillation, end-stage renal disease and discharged to care facility with supplemental nocturnal oxygen.\"  \"[facility] from 1/[facility] to 2/26/2025 for acute hypoxic respiratory failure multifactorial, was intubated ( 1/9-1/18), reintubated (1/20 - 1/25).  Then was treated for shock with pressors, influenza A pneumonia, hospital-acquired pneumonia with intravenous antibiotics, steroids, antivirals.  Then also noted to have heart failure exacerbation, recurrent A-fib with RVR, subsequent bradycardia.  Then also noted to have encephalopathy likely from infectious, metabolic etiology and delirium.  During that hospitalization was noted to have dysphagia, pharyngeal edema, was evaluated by ENT on 2/16.  No findings to explain dysphagia.  G-tube was placed by IR on 2/17 and was on nocturnal tube feeds.  Discharged to TCU for " HPI Comments: 71-year-old male presents with body aches, fatigue, headache, sore throat, cough started yesterday. His wife was diagnosed with the flu today. He has had multiple ill contacts at work. No shortness of breath. Patient is a 43 y.o. male presenting with flu symptoms. The history is provided by the patient. Flu   Associated symptoms include headaches, sore throat and myalgias. Pertinent negatives include no chest pain, no shortness of breath, no vomiting and no confusion. Past Medical History:   Diagnosis Date    Diabetes ([address]     Hypertension     Other ill-defined conditions(799.89)     high chol    Psychiatric disorder     anxiety       No past surgical history on file. No family history on file. Social History     Social History    Marital status:      Spouse name: N/A    Number of children: N/A    Years of education: N/A     Occupational History    Not on file. Social History Main Topics    Smoking status: Never Smoker    Smokeless tobacco: Not on file    Alcohol use No    Drug use: No    Sexual activity: Not on file     Other Topics Concern    Not on file     Social History Narrative         ALLERGIES: Review of patient's allergies indicates no known allergies. Review of Systems   Constitutional: Positive for fatigue and fever. HENT: Positive for sore throat. Negative for congestion and trouble swallowing. Eyes: Negative for visual disturbance. Respiratory: Positive for cough. Negative for shortness of breath. Cardiovascular: Negative for chest pain. Gastrointestinal: Negative for abdominal pain and vomiting. Musculoskeletal: Positive for arthralgias and myalgias. Skin: Negative for rash. Neurological: Positive for headaches. Psychiatric/Behavioral: Negative for confusion.        Vitals:    01/05/18 1804   BP: (!) 171/91   Pulse: (!) 104   Resp: 20   Temp: 100.3 °F (37.9 °C)   SpO2: 96%   Weight: 115.7 kg (255 lb)   Height: 5' 11\" "rehabilitation.\"    E. Faecalis bacteremia  Leukocytosis  Elevated procalcitonin   Elevated CRP  On admission, WBC elevated to 17.4, Procal elevated to 1.3, and CRP elevated to 39.78.   Unclear infectious source. She has some mild non-specific urinary bladder wall thickening, but with ESRD makes very little urine. Also has a new MIKAYLA 3mm nodule which may be infectious or inflammatory, but no respiratory complaints.   * COVID, flu RSV pending   *UA very abnormal but difficult to interpret in the setting of ESRD. Ultimately Ucx positive for E faecalis. Could be initial source or seeding from bacteremia.  * Bcx positive for E. Faecalis. Unclear source. Concern for endocarditis given how quickly blood cultures are coming back positive  *TTE without vegetation seen.  * TTE completed 4/1, results pending   - ID consulted for assistance   - Continue IV vancomycin   - Repeat Bcx until cleared, no growth since 3/29   - Per ID, plan to complete 6 week course with IV vancomycin.   - Will need to arrange outpatient antibiotics with dialysis.     Abdominal pain  Retching, with Hx of GERD   Dysphagia s/p PEG tube (2/1/2025)   Constipation, rectal stool ball   Pt presents to the ED with vague complaints of retching/dry heaving without vomiting and some crampy lower abdominal pain.   * CT scan showed large rectal stool ball without evidence of obstruction. There was also some non-specific mild urinary bladder wall thickening. No other intra-abdominal pathology.   - Unclear exactly what is going on. Pt did not have any retching or other symptoms while I was in the room. Perhaps she has some reflux of tube feeds or other esophageal issue.  - GI consulted for assessment:   - EGD on 3/30 without etiology to explain dysphagia   - XR Esophagram normal  - Continue Protonix 40 mg daily  - Continue bowel regimen to work on the constipation   - Calorie counts so we can try to wean off tube feeds if able  - Zofran available PRN    End-stage " (1.803 m)            Physical Exam   Constitutional: He appears well-developed and well-nourished. HENT:   Head: Normocephalic and atraumatic. Right Ear: External ear normal. Tympanic membrane is injected. Tympanic membrane is not erythematous. A middle ear effusion is present. Left Ear: External ear normal. Tympanic membrane is injected. Tympanic membrane is not erythematous. A middle ear effusion is present. Nose: Nose normal.   Mouth/Throat: Posterior oropharyngeal erythema present. No oropharyngeal exudate. Eyes: Conjunctivae are normal. Pupils are equal, round, and reactive to light. Neck: Normal range of motion. Neck supple. Cardiovascular: Regular rhythm, normal heart sounds and intact distal pulses. Pulmonary/Chest: Effort normal and breath sounds normal. No respiratory distress. He has no wheezes. Abdominal: Soft. Bowel sounds are normal. He exhibits no distension. There is no tenderness. Musculoskeletal: Normal range of motion. He exhibits no edema. Neurological: He is alert. Skin: Skin is warm and dry. Psychiatric: Judgment normal.   Nursing note and vitals reviewed. MDM  Number of Diagnoses or Management Options  Flu syndrome:   Diagnosis management comments: Parts of this document were created using dragon voice recognition software. The chart has been reviewed but errors may still be present. Likely flu as wife flu A positive. Supportive care. I discussed the results of all labs, procedures, radiographs, and treatments with the patient and available family. Treatment plan is agreed upon and the patient is ready for discharge. Questions about treatment in the ED and differential diagnosis of presenting condition were answered. Patient was given verbal discharge instructions including, but not limited to, importance of returning to the emergency department for any concern of worsening or continued symptoms.   Instructions were given to follow up with a primary care provider or specialist within 1-2 days. Adverse effects of medications, if prescribed, were discussed and patient was advised to refrain from significant physical activity until followed up by primary care physician and to not drive or operate heavy machinery after taking any sedating substances.            Amount and/or Complexity of Data Reviewed  Clinical lab tests: reviewed and ordered (Results for orders placed or performed during the hospital encounter of 01/05/18  -INFLUENZA A & B AG (RAPID TEST)       Result                                            Value                         Ref Range                       Influenza A Ag                                    NEGATIVE                      NEG                             Influenza B Ag                                    NEGATIVE                      NEG                        )      ED Course       Procedures renal disease on HD   Dialyzes MWF via L arm fistula. Per prior notes, volume removal has often been limited by intra dialytic hypotension and a fib with RVR   *Noted that patient has been getting short of breath on Sundays in the past and there was discussion of adding a fourth dialysis day on Saturdays. As of now, nephrology challenging dry weight to see if that helps.  - Nephrology consulted   - Continue PTA midodrine MWF before dialysis   - Continue PTA calcitriol, renvela, vit D.     Heart failure with preserved ejection fraction  Markedly elevated BNP without clinical features of exacerbation   Paroxysmal atrial fibrillation on chronic anticoagulation  Hypertension  Hyperlipidemia  Troponin elevation, Chronic Non-ischemic Myocardial Injury due to chronic heart failure with preserved ejection fraction and end-stage renal disease (85->85), no treatment indicated   [PTA medications include: amiodarone 200mg BID, amlodipine 10mg daily, Apixaban 5mg BID, atorvastatin 20mg daily, hydralazine 75mg Q8H,   *TTE 1/21/2025 with LVEF of 60%.  No valvular abnormality   * RHC 3/5/25 showing moderately elevated right and left-sided filling pressure; elevated wedge pressure consistent with pulmonary hypertension  * On admission now, pt has markedly elevated BNP to 51,544 (though historically is in the 20,000-40,000 range), She does have small pleural effusions on CT, but no other overt evidence of volume overload or clinical HF. She is satting well on room air.  - I/Os and daily weights ordered  - continue PTA apixaban 5 mg BID  - continue PTA atorvastatin 20 mg daily  - continue PTA hydralazine 75 mg q8H  - continue PTA amiodarone 200 mg BID  - continue PTA amlodipine 10 m qday  - Volume management per nephrology  - Avoid using supplemental oxygen unless patient is satting <90% on room air.    Vaginal bleeding   Pt has had intermittent vaginal bleeding this stay. Nursing and pt quite convinced it is in fact vaginal rather  "than rectal or urethral.   - transvaginal ultrasound ordered for further eval negative for any concerning findings.   - Recommend outpatient Gyn follow-up     Anxiety/depression/insomnia  - Continue PTA zoloft     Anemia of chronic disease  Hemoglobin 9.2 on admission, stable at 8.3 now  - Monitor CBC       Type II DM  - Continue PTA lantus 15 units BID   - MDSSI     Physical deconditioning from medical illness, senile frailty.  S/p left above-knee amputation, traumatic  Patient from TCU, daughter hopeful pt will be able to discharge back home with services rather than going back to TCU.   - PT/OT consulted      Sacrococcygeal Pressure injury, Stage 3, present on admission  Wound care team followed.  Pressure injury prevention.      Obesity with a BMI of 34.  Increase all-cause mortality and morbidity.          Diet: Calorie Counts  Adult Formula Drip Feeding: Continuous [employer] Renal Support; Gastrostomy; Goal Rate: 65; mL/hr; From: 6:00 PM; To: 6:00 AM; 3/31: Decrease cycle time from 14 hrs to 12 hrs (6:00 pm to 6:00 am)  Renal Diet (dialysis)    DVT Prophylaxis: DOAC  Bradshaw Catheter: Not present  Lines: None     Cardiac Monitoring: None  Code Status: No CPR- Pre-arrest intubation OK      Clinically Significant Risk Factors          # Hypochloremia: Lowest Cl = 95 mmol/L in last 2 days, will monitor as appropriate      # Hypoalbuminemia: Lowest albumin = 3.2 g/dL at 3/29/2025 11:39 AM, will monitor as appropriate       # Hypertension: Noted on problem list  # Chronic heart failure with preserved ejection fraction: heart failure noted on problem list and last echo with EF >50%          # DMII: A1C = 6.5 % (Ref range: <5.7 %) within past 6 months   # Obesity: Estimated body mass index is 34.91 kg/m  as calculated from the following:    Height as of this encounter: 1.676 m (5' 6\").    Weight as of this encounter: 98.1 kg (216 lb 4.3 oz).        # Financial/Environmental Concerns:           Social Drivers of Health "    Tobacco Use: Medium Risk (3/30/2025)    Patient History     Smoking Tobacco Use: Former     Smokeless Tobacco Use: Never   Physical Activity: Insufficiently Active (7/19/2024)    Exercise Vital Sign     Days of Exercise per Week: 1 day     Minutes of Exercise per Session: 10 min   Social Connections: Unknown (7/19/2024)    Social Connection and Isolation Panel [NHANES]     Frequency of Social Gatherings with Friends and Family: More than three times a week          Disposition Plan     Medically Ready for Discharge: Anticipated in 2-4 Days pending evaluation of bacteremia             Ekaterina Gayle MD  Hospitalist Service  [facility]  Securely message with "[employer]" (more info)  Text page via [facility] Paging/Directory   ______________________________________________________________________    Interval History   Pt doing well today. Seen after dialysis. This went well. No pain or shortness of breath.       Physical Exam   Vital Signs: Temp: 98.3  F (36.8  C) Temp src: Oral BP: (!) 154/47 Pulse: 75   Resp: 18 SpO2: 96 % O2 Device: Nasal cannula Oxygen Delivery: 1 LPM  Weight: 216 lbs 4.34 oz  Constitutional: Awake, alert, cooperative, no apparent distress.  Appears chronically ill.  Pulmonary: No increased work of breathing, good air exchange, clear to auscultation bilaterally, no crackles or wheezing.  Cardiovascular: Regular rate and rhythm, normal S1 and S2, no S3 or S4. No murmurs, rubs, or gallops noted.  GI: Normal bowel sounds, soft, non-distended, mild tenderness of abdomen generally.  PEG tube in place. No guarding or rebound.  Skin/Integumen: No cyanosis or jaundice. No rashes noted.  Extremities: No lower extremity edema. L AKA.   Neuro: A&Ox4. Conversant. Responds appropriately in conversation. Moves all extremities with no focal deficit identified.      Medical Decision Making     45 MINUTES SPENT BY ME on the date of service doing chart review, history, exam, documentation &  further activities per the note.      Data   ------------------------- PAST 24 HR DATA REVIEWED -----------------------------------------------    I have personally reviewed the following data over the past 24 hrs:    8.3  \   7.9 (L)   / 187     135 95 (L) 55.4 (H) /  123 (H)   4.4 27 3.95 (H) \       Imaging results reviewed over the past 24 hrs:   Recent Results (from the past 24 hours)   US Pelvic Complete with Transvaginal    Narrative    EXAM: US PELVIC TRANSABDOMINAL AND TRANSVAGINAL  LOCATION: [facility]  DATE: 4/1/2025    INDICATION: abnormal vaginal bleeding  COMPARISON: CT 5/7/2024  TECHNIQUE: Transabdominal scans were performed. Endovaginal ultrasound was performed to better visualize the adnexa.    FINDINGS:    UTERUS: 6.8 x 4.6 x 4.0 cm. Uterus is overall poorly seen due to extensive bowel gas. Some areas were measured on the exam below this appears to be artifact from bowel gas. No convincing fibroids.    ENDOMETRIUM: Endometrium poorly seen and difficult to definitively measure.  RIGHT OVARY: Not seen due to bowel gas.    LEFT OVARY: Not seen due to bowel gas.  No significant free fluid.      Impression    IMPRESSION:  1.  Very difficult exam due to patient's body habitus and fairly extensive amount of bowel gas.    2.  Neither ovary is clearly seen due to bowel gas.    3.  Endometrium is poorly seen as well. No concerning findings.

## 2025-04-02 NOTE — PROGRESS NOTES
Assessment and Plan:     End-stage renal disease: Seen on dialysis.  Dialysis today 3.5 hours, left arm fistula with 15-gauge needle and 400 blood flow rate, 2 L ultrafiltration, 3K, 33 bicarb, 138 sodium.  EPO and Venofer on dialysis.  Low-dose heparin.    She is on calcitriol, vitamin D, Renvela.            Interval History:   Enterococcus bacteremia, question endocarditis.  ID is following.  She is on vancomycin.     Hypertension: She is on amlodipine, hydralazine.  She also gets midodrine on dialysis days.    PEG for nutrition.    Diabetes mellitus.   -Diabetic diet  -Continue home diabetes regimen  -Start sliding-scale insulin           Review of Systems:   No complaints on dialysis.          Medications:     Current Facility-Administered Medications   Medication Dose Route Frequency Provider Last Rate Last Admin    - MEDICATION INSTRUCTIONS for Dialysis Patients -   Does not apply See Admin Instructions Ekaterina Gayle MD        amiodarone (PACERONE) tablet 200 mg  200 mg Oral or Feeding Tube BID Ekaterina Gayle MD   200 mg at 04/01/25 2115    amLODIPine (NORVASC) tablet 10 mg  10 mg Oral or Feeding Tube Once per day on Sunday Tuesday Thursday Saturday Ekaterina Gayle MD   10 mg at 04/01/25 0854    [Held by provider] apixaban ANTICOAGULANT (ELIQUIS) tablet 5 mg  5 mg Oral or Feeding Tube BID Ekaterina Gayle MD   5 mg at 03/30/25 2106    atorvastatin (LIPITOR) tablet 20 mg  20 mg Oral or Feeding Tube QPM Ekaterina Gayle MD   20 mg at 04/01/25 2115    calcitRIOL (ROCALTROL) capsule 0.25 mcg  0.25 mcg Oral Once per day on Monday Wednesday Friday Ekaterina Gayle MD   0.25 mcg at 03/31/25 1404    cetirizine (zyrTEC) tablet 10 mg  10 mg Oral or Feeding Tube At Bedtime Ekaterina Gayle MD   10 mg at 03/30/25 2217    epoetin candy-epbx (RETACRIT) injection 10,000 Units  10,000 Units Intravenous Once in dialysis/CRRT Braden Oneill MD        heparin (porcine) injection  500  Units Hemodialysis Machine OR IV Push Once in dialysis/CRRT Braden Oneill MD        hydrALAZINE (APRESOLINE) tablet 75 mg  75 mg Oral or Feeding Tube Q8H Ekaterina Gayle MD   75 mg at 04/01/25 2115    insulin aspart (NovoLOG) injection (RAPID ACTING)  1-7 Units Subcutaneous TID AC Ekaterina Gayle MD   1 Units at 03/30/25 1811    insulin aspart (NovoLOG) injection (RAPID ACTING)  1-5 Units Subcutaneous At Bedtime Ekaterina Gayle MD        insulin glargine (LANTUS PEN) injection 12 Units  12 Units Subcutaneous BID Ekaterina Gayle MD   12 Units at 03/31/25 0800    iron sucrose (VENOFER) injection 50 mg  50 mg Intravenous Once in dialysis/CRRT Braden Oneill MD        midodrine (PROAMATINE) tablet 5 mg  5 mg Oral Once per day on Monday Wednesday Friday Ekaterina Gayle MD   5 mg at 03/31/25 0802    pantoprazole (PROTONIX) 2 mg/mL suspension 40 mg  40 mg Per Feeding Tube QAM AC Ekaterina Gayle MD   40 mg at 04/01/25 0551    polyethylene glycol (MIRALAX) Packet 17 g  17 g Oral or Feeding Tube BID Ekaterina Gayle MD   17 g at 04/01/25 2116    [Held by provider] Prosource TF20 ENfit Compatibl EN LIQD (PROSOURCE TF20) packet 60 mL  1 packet Per Feeding Tube Daily Ekaterina Gayle MD   60 mL at 03/31/25 1407    sennosides (SENOKOT) tablet 2 tablet  2 tablet Oral BID Ekaterina Gayle MD   2 tablet at 04/01/25 2115    sertraline (ZOLOFT) tablet 75 mg  75 mg Oral or Feeding Tube Daily Ekaterina Gayle MD   75 mg at 04/01/25 0854    sevelamer carbonate (RENVELA) tablet 1,600 mg  1,600 mg Oral TID w/meals Ekaterina Gayle MD   1,600 mg at 04/01/25 1606    sodium chloride (PF) 0.9% PF flush 3 mL  3 mL Intracatheter Q8H [facility] Ekaterina Gayle MD   3 mL at 04/01/25 2116    sodium chloride 0.9% BOLUS 200 mL  200 mL Hemodialysis Machine Once Braden Oneill MD        sodium chloride 0.9% BOLUS 250 mL  250 mL Intravenous Once in dialysis/CRRT Braden Oneill MD         sodium chloride 0.9% BOLUS 500 mL  500 mL Hemodialysis Machine Once Braden Oneill MD        vancomycin place cardoso - receiving intermittent dosing  1 each Intravenous See Admin Instructions Ekaterina Gayle MD        vitamin D3 (CHOLECALCIFEROL) tablet 50 mcg  50 mcg Oral or Feeding Tube Daily Ekaterina Gayle MD   50 mcg at 04/01/25 0854     Current Facility-Administered Medications   Medication Dose Route Frequency Provider Last Rate Last Admin    dextrose 10% infusion   Intravenous Continuous PRN Ekaterina Gayle MD        heparin 10,000 units/10 mL infusion (DIALYSIS USE)  500 Units/hr Hemodialysis Machine Continuous Braden Oneill MD        Stop Heparin 60 minutes before end of treatment   Does not apply Continuous PRN Braden Oneill MD         Current active medications and PTA medications reviewed, see medication list for details.            Physical Exam:   Vitals were reviewed  Patient Vitals for the past 24 hrs:   BP Temp Temp src Pulse Resp SpO2 Weight   04/02/25 1000 (!) 140/94 -- -- -- 27 -- --   04/02/25 0945 (!) 146/60 -- -- 71 25 -- --   04/02/25 0930 (!) 160/56 -- -- 70 17 100 % --   04/02/25 0915 (!) 164/61 -- -- 69 30 99 % --   04/02/25 0900 (!) 167/55 -- -- 70 -- -- --   04/02/25 0852 (!) 156/85 -- -- 70 -- -- --   04/02/25 0845 (!) 158/55 98.3  F (36.8  C) Oral 70 18 99 % --   04/02/25 0753 (!) 170/51 -- Oral 72 18 94 % --   04/02/25 0600 -- -- -- -- -- -- 93.8 kg (206 lb 14.4 oz)   04/02/25 0500 (!) 155/50 98.3  F (36.8  C) Oral 73 18 98 % --   04/01/25 2304 -- -- -- -- -- 99 % --   04/01/25 1932 130/41 98.3  F (36.8  C) Oral 75 20 97 % --   04/01/25 1617 (!) 163/50 98.5  F (36.9  C) Oral 67 28 97 % --   04/01/25 1549 (!) 162/57 98.3  F (36.8  C) Oral 70 16 93 % --   04/01/25 1515 (!) 145/95 -- -- 63 16 94 % --   04/01/25 1500 (!) 156/62 -- -- 68 20 92 % --   04/01/25 1445 (!) 154/63 -- -- 70 26 95 % --   04/01/25 1430 (!) 178/56 -- -- 69 25 97 % --    25 1424 (!) 181/55 -- -- 69 23 94 % --   25 1421 (!) 173/52 -- -- 68 22 95 % --   25 1418 (!) 161/117 -- -- 68 21 97 % --   25 1415 (!) 173/52 -- -- 68 19 95 % --   25 1412 (!) 174/70 -- -- 70 25 92 % --   25 1407 (!) 179/120 -- -- 75 12 94 % --   25 1405 (!) 189/52 -- -- 73 16 97 % --   25 1335 (!) 196/64 -- -- 68 16 98 % --   25 1249 -- -- -- -- -- 92 % --   25 1156 -- -- -- -- -- 96 % --   25 1029 -- -- -- -- -- 100 % --       Temp:  [98.3  F (36.8  C)-98.5  F (36.9  C)] 98.3  F (36.8  C)  Pulse:  [63-75] 71  Resp:  [12-30] 27  BP: (130-196)/() 140/94  SpO2:  [92 %-100 %] 100 %    Temperatures:  Current - Temp: 98.3  F (36.8  C); Max - Temp  Av.3  F (36.8  C)  Min: 98.3  F (36.8  C)  Max: 98.5  F (36.9  C)  Respiration range: Resp  Av.8  Min: 12  Max: 30  Pulse range: Pulse  Av.8  Min: 63  Max: 75  Blood pressure range: Systolic (24hrs), Av , Min:130 , Max:196   ; Diastolic (24hrs), Av, Min:41, Max:120    Pulse oximetry range: SpO2  Av %  Min: 92 %  Max: 100 %    I/O last 3 completed shifts:  In: 120 [P.O.:120]  Out: -       Intake/Output Summary (Last 24 hours) at 2025 1005  Last data filed at 2025 1926  Gross per 24 hour   Intake 120 ml   Output --   Net 120 ml       Alert and responsive, nasal cannula O2  She is somewhat confused  Lungs show clear anterior breath sounds  Cardiac exam regular rhythm normal S1-S2 no murmur  Left upper arm fistula with needles in place  Right lower extremity with stasis changes and tenderness to palpation, left lower extremity BKA       Wt Readings from Last 4 Encounters:   25 93.8 kg (206 lb 14.4 oz)   25 92.5 kg (204 lb)   25 92.7 kg (204 lb 5.9 oz)   25 101.8 kg (224 lb 6.9 oz)          Data:          Lab Results   Component Value Date     2025     2025     2025     2021     2021    NA  140 11/18/2020    Lab Results   Component Value Date    CHLORIDE 95 04/02/2025    CHLORIDE 96 03/31/2025    CHLORIDE 100 03/30/2025    CHLORIDE 106 11/24/2021    CHLORIDE 109 11/23/2021    CHLORIDE 110 11/22/2021    CHLORIDE 105 04/17/2021    CHLORIDE 101 04/09/2021    CHLORIDE 106 11/18/2020    Lab Results   Component Value Date    BUN 55.4 04/02/2025    BUN 81.4 03/31/2025    BUN 55.7 03/30/2025    BUN 37 11/24/2021    BUN 69 11/23/2021    BUN 64 11/22/2021    BUN 68 04/17/2021    BUN 53 04/09/2021    BUN 44 11/18/2020      Lab Results   Component Value Date    POTASSIUM 4.4 04/02/2025    POTASSIUM 3.7 03/31/2025    POTASSIUM 3.6 03/30/2025    POTASSIUM 4.7 02/02/2022    POTASSIUM 3.8 11/24/2021    POTASSIUM 6.6 11/23/2021    POTASSIUM 4.2 04/17/2021    POTASSIUM 3.8 04/16/2021    POTASSIUM 3.9 04/09/2021    Lab Results   Component Value Date    CO2 27 04/02/2025    CO2 27 03/31/2025    CO2 24 03/30/2025    CO2 31 11/24/2021    CO2 23 11/23/2021    CO2 24 11/22/2021    CO2 23 04/17/2021    CO2 22 04/09/2021    CO2 29 11/18/2020    Lab Results   Component Value Date    CR 3.95 04/02/2025    CR 5.00 03/31/2025    CR 3.60 03/30/2025    CR 5.98 04/17/2021    CR 4.35 04/16/2021    CR 5.28 04/09/2021        Recent Labs   Lab Test 04/02/25  0759 03/31/25  0731 03/30/25  2222 03/30/25  0616   WBC 8.3 7.9  --  7.3   HGB 7.9* 8.3* 7.6* 7.6*   HCT 24.7* 25.8*  --  23.9*   MCV 94 94  --  96    209  --  189     Recent Labs   Lab Test 03/29/25  1139 03/28/25  0320 03/27/25  1135   AST 12 11 16   ALT 14 16 22   ALKPHOS 82 93 92   BILITOTAL 0.3 0.4 0.4       Recent Labs   Lab Test 03/31/25  0731 03/17/25  0936 03/09/25  1301   MAG 2.3 2.5* 2.3     Recent Labs   Lab Test 03/31/25  0731 03/17/25  0936 03/09/25  1301   PHOS 3.7 4.8* 4.6*     Recent Labs   Lab Test 04/02/25  0759 03/31/25  0731 03/30/25  0616   JODY 10.1 10.2 9.9       Lab Results   Component Value Date    JODY 10.1 04/02/2025     Lab Results   Component Value  Date    WBC 8.3 04/02/2025    HGB 7.9 (L) 04/02/2025    HCT 24.7 (L) 04/02/2025    MCV 94 04/02/2025     04/02/2025     Lab Results   Component Value Date     04/02/2025    POTASSIUM 4.4 04/02/2025    CHLORIDE 95 (L) 04/02/2025    CO2 27 04/02/2025     (H) 04/02/2025     (H) 04/02/2025     Lab Results   Component Value Date    BUN 55.4 (H) 04/02/2025    CR 3.95 (H) 04/02/2025     Lab Results   Component Value Date    MAG 2.3 03/31/2025     Lab Results   Component Value Date    PHOS 3.7 03/31/2025       Creatinine   Date Value Ref Range Status   04/02/2025 3.95 (H) 0.51 - 0.95 mg/dL Final   03/31/2025 5.00 (H) 0.51 - 0.95 mg/dL Final   03/30/2025 3.60 (H) 0.51 - 0.95 mg/dL Final   03/29/2025 2.65 (H) 0.51 - 0.95 mg/dL Final   03/28/2025 3.60 (H) 0.51 - 0.95 mg/dL Final   03/27/2025 2.67 (H) 0.51 - 0.95 mg/dL Final   04/17/2021 5.98 (H) 0.52 - 1.04 mg/dL Final   04/16/2021 4.35 (H) 0.52 - 1.04 mg/dL Final   04/09/2021 5.28 (H) 0.52 - 1.04 mg/dL Final   11/18/2020 4.23 (H) 0.52 - 1.04 mg/dL Final   11/16/2020 5.08 (H) 0.52 - 1.04 mg/dL Final   09/25/2020 6.87 (H) 0.52 - 1.04 mg/dL Final       Attestation:  I have reviewed today's vital signs, notes, medications, labs and imaging.  Seen on dialysis.     Braden Oneill MD

## 2025-04-02 NOTE — PROGRESS NOTES
DIALYSIS PROCEDURE NOTE  Hepatitis status of previous patient on machine log was checked and verified ok to use with this patients hepatitis status.  Patient dialyzed for 3.5 hrs. on a K3 bath with a net fluid removal of  2L.  A BFR of 450 ml/min was obtained via a left arm AVfistula using 15 gauge needles.      The treatment plan was discussed with Dr. Oneill during the treatment.    Total heparin received during the treatment: 1700 units.   Needle cannulation sites held x 5 min.     Meds  given: Epogen 31356 and Iron 2.5 ml   Complications: None      Person educated: pt. Knowledge base substantial. Barriers to learning: none. Educated on procedure via verbal mode. Patient verbalized understanding.   ICEBOAT? Timeout performed pre-treatment  I: Patient was identified using 2 identifiers  C:  Consent Signed Yes  E: Equipment preventative maintenance is current and dialysis delivery system OK to use  B: Hepatitis B Surface Antigen: Negative; Draw Date: 3/3/2025      Hepatitis B Surface Antibody: susceptible; Draw Date: 3/3/2025  O: Dialysis orders present and complete prior to treatment  A: Vascular access verified and assessed prior to treatment  T: Treatment was performed at a clinically appropriate time  ?: Patient was allowed to ask questions and address concerns prior to treatment  See Adult Hemodialysis flowsheet in [facility] for further details and post assessment.  Machine water alarm in place and functioning. Transducer pods intact and checked every 15min.   Pt assisted with repositioning throughout dialysis treatment.  Pt returned via bed.  Chlorine/Chloramine water system checked every 4 hours.  Outpatient Dialysis at [facility].      Post treatment report given to Faina VILLALPANDO RN regarding 2 L of fluid removed, last BP        Marianne REYES RN

## 2025-04-02 NOTE — PROGRESS NOTES
CALORIE COUNT      Approximate Oral Intake for:    4/1/25  Calories: 192 kcal   Protein: 0 grams       Intake from TF/PN:       Grecia Estrella Renal @ goal of 65 ml x 12 hours  (from 1800 to 0600 daily) will provide: 780 ml formula, 1404 kcals, 62 g PRO, 134 g CHO, 16 g fiber, and 515 ml free H2O daily.     + ProSource TF20 once daily (80 kcal and 20 g protein)   Total (TF + ProSource TF20)= 1484 kcal (22 kcal/kg), 82 g protein (1.2 g/kg),   - This meets 89% energy and 100% PRO needs       Estimated Needs:    Calories: 6941-3440 kcals/day (25 - 35 kcals/kg)  Justification: Overweight, HD  Protein:  grams protein/day (1.2 - 1.8 grams of pro/kg)  Justification: HD (with DM), hospitalized    Miguelina Feliciano RD, LD, CNSC   Clinical Dietitian - [facility]

## 2025-04-02 NOTE — PLAN OF CARE
Isolation/Type:n/a  Tests/Procedures for NEXT shift: none scheduled  Consults? (Pending/following, signed-off?) GI signed off, WOC, PT, ID following  Where is patient from? (Home, TCU, etc.): TCU [facility]  Other Important info for NEXT shift:  Has L arm fistula, L AKA, HD schedule MWF  Anticipated DC date & active delays: TBD, SW for discharge plan     SUMMARY NOTE:  Orientation/Cognitive: A&Ox3-4  Observation Goals (Met/ Not Met): INP  Mobility Level/Assist Equipment: A2 Lift, repo  Antibiotics & Plan (IV/po, length of tx left): none currently, recently vanco  Pain Management: Denies  Complete Pain Reassessment: Y/N Y Due next: next shift  Tele/VS/O2: on 1 L/min, HTN  ABNL Lab/BG: See Chart  Diet: tube feed [id number], renal diet, calorie count  Bowel/Bladder: incont  Skin Concerns: Left buttocks open wound  Drains/Devices: PIV SL- PEG tube  Patient Stated Goal for Today: none

## 2025-04-02 NOTE — PROGRESS NOTES
[facility] Care Coordination Contact      [facility] Six-Month Telephone Assessment    6 month telephone assessment completed on 4/1/25. Phone call completed with daughter, Caroline.   Caroline is enquiring about adaptive vehicles as she is hoping to discharge Supriya to her home versus TCU. Resources provided to Caroline.     ER visits: Yes -  M [facility] dx SOB  Hospitalizations: Yes -  M [facility] 1/8/25 dx COVID, 3/2/25 M [facility] dx ESRD, 3/9/25 M [facility] dx COVID, 3/27/25 M [facility] dx ESRD.   TCU stays: Yes -  Has been at [facility] between all hospital admissions since  2/26/25  Significant health status changes: Frequent hospitalizations  Falls/Injuries: No  ADL/IADL changes: Unclear at this time.  Is currently hospitalized.    Changes in services: Yes: EW and all services have been discontinued due to being out of the community for greater than 30 days.  Currently inpt at [facility].  Daughter wishes to her to discharge to her home versus returning to TCU.     Caregiver Assessment follow up:  NA    Goals: See Support Plan for goal progress documentation.      Will see member in 6 months for an annual health risk assessment.   Encouraged member to call CC with any questions or concerns in the meantime.     Ellen Guerrero RN, BSN, PHN  [facility]  [phone]  Fax: [phone]

## 2025-04-02 NOTE — PLAN OF CARE
Goal Outcome Evaluation:  from [facility] TCU  admitted with abdominal pain, constipation, elevated wbcs, crp, positive Bcs  DATE & TIME: 4/2/ 25 1700- 1930 transferred from short stay  Cognitive Concerns/ Orientation : A&O x3,forgetful   BEHAVIOR & AGGRESSION TOOL COLOR: green  CIWA SCORE: NA   ABNL VS/O2: VSS, on 1L o2,  sats drops to low 80s on RA  MOBILITY: total, up with lift, L BKA  PAIN MANAGMENT: denied  DIET: Renal, TF at 65cc/hr  from 6pm to 6am with 60cc flushes before and after.  BOWEL/BLADDER: incontinent, BM x 1, dialysis patient, still produce some urine.  ABNL LAB/BG: Cr 3.95, Hg 7.9, BC and UC E. fecalis  DRAIN/DEVICES: R piv Sl , dialysis fistula on Left arm, G tube  TELEMETRY RHYTHM:   SKIN: scattered bruises, left Buttock wound,scab to left stump and G tube site.  TESTS/PROCEDURES: had  dialysis today- 2L off  D/C DATE: pending progress  Discharge Barriers: placement. CC following,TCU vs Home  OTHER IMPORTANT INFO: ID/WOC/ SW/ Neph/ Nutrition following.ID recommending treating with vancomycin with dialysis 6 weeks course followed by repeat blood culture , calorie count. Dialysis on M/W/F.C pap at HS.

## 2025-04-02 NOTE — TELEPHONE ENCOUNTER
1st attempt- Left voicemail for the patient to call back and schedule the following:    Appointment type:  Return EP  Provider:  MICHELLE PROCTOR  Return date:  7/2025  Additional appointment(s) needed:  Yes  Additional Notes: Labs to be done prior  Specialty phone number: [phone]

## 2025-04-02 NOTE — PLAN OF CARE
Isolation/Type:n/a  Tests/Procedures for NEXT shift: none scheduled  Consults? (Pending/following, signed-off?) GI signed off, WOC seen, PT, ID following  Where is patient from? (Home, TCU, etc.): TCU [facility]  Other Important info for NEXT shift:  Has L arm fistula, L AKA, HD schedule MWF  - Pt to notify staff when ready for CPAP  - Refuse CPAP Today  Anticipated DC date & active delays: TBD     SUMMARY NOTE:  Orientation/Cognitive: A&Ox3-4  Observation Goals (Met/ Not Met): INP  Mobility Level/Assist Equipment: A2 Lift  Antibiotics & Plan (IV/po, length of tx left): none currently, recently vanco  Pain Management: Denies  Complete Pain Reassessment: Y/N Y Due next: next shift  Tele/VS/O2: on 1-2 L/min today. Unable to wean to RA this shift.   ABNL Lab/BG: See Chart  Diet: tube feed [id number]  Total TF  Bowel/Bladder: incont  Skin Concerns: Left buttocks open wound- wound care applied  Drains/Devices: PIV SL- TF running @ 65ml/hr  Patient Stated Goal for Today: none

## 2025-04-02 NOTE — PHARMACY-VANCOMYCIN DOSING SERVICE
Pharmacy Vancomycin Note  Date of Service 2025  Patient's  1950   75 year old, female    Indication: Bacteremia  Current vancomycin regimen:  intermittent dose based on level    Current vancomycin monitoring method: Renal Replacement Therapy  Current vancomycin therapeutic monitoring goal: 15-20 mg/L    Current estimated CrCl = Estimated Creatinine Clearance: 14 mL/min (A) (based on SCr of 3.95 mg/dL (H)).    Creatinine for last 3 days  3/31/2025:  7:31 AM Creatinine 5.00 mg/dL  2025:  7:59 AM Creatinine 3.95 mg/dL    Recent Vancomycin Levels (past 3 days)  3/31/2025:  7:31 AM Vancomycin 15.7 ug/mL  2025:  7:59 AM Vancomycin 22.4 ug/mL    Vancomycin IV Administrations (past 72 hours)                     vancomycin (VANCOCIN) 1,500 mg in 0.9% NaCl 265 mL intermittent infusion (mg) 1,500 mg New Bag 25 1418                    Nephrotoxins and other renal medications (From now, onward)      Start     Dose/Rate Route Frequency Ordered Stop    25 1003  vancomycin place cardoso - receiving intermittent dosing         1 each Intravenous SEE ADMIN INSTRUCTIONS 25 1003                 Contrast Orders - past 72 hours (72h ago, onward)      Start     Dose/Rate Route Frequency Stop    25 1330  barium sulfate (E-Z-PAQUE) oral suspension 60%          Oral ONCE 25 1329            Interpretation of levels and current regimen:  Vancomycin level is reflective of therapeutic level    Has serum creatinine changed greater than 50% in last 72 hours: No    Urine output:  unable to determine    Renal Function: ESRD on Dialysis    Insight[employer] Prediction of Planned New Vancomycin Regimen      Plan:  Give 1000mg x 1 for pre-dialysis level of 22.4  Vancomycin monitoring method:  Renal Replacement Therapy  Vancomycin therapeutic monitoring goal: 15-20 mg/L  Pharmacy will check vancomycin levels as appropriate in 3-5 Days.  Serum creatinine levels will be ordered .    Deena Dinero [facility]

## 2025-04-02 NOTE — PROGRESS NOTES
Care Management Follow Up    Length of Stay (days): 5    Expected Discharge Date: 04/04/2025     Concerns to be Addressed:       Patient plan of care discussed at interdisciplinary rounds: Yes    Anticipated Discharge Disposition: Home, Home Care, Transitional Care              Anticipated Discharge Services: Transportation Services, Home Care  Anticipated Discharge DME:      Patient/family educated on Medicare website which has current facility and service quality ratings: no  Education Provided on the Discharge Plan:    Patient/Family in Agreement with the Plan: yes    Referrals Placed by CM/SW:    Private pay costs discussed: Not applicable    Discussed  Partnership in Safe Discharge Planning  document with patient/family: Yes: verbally     Handoff Completed: Yes, [facility] PCP: Internal handoff referral completed    Additional Information:  Writer went into room to speak to patient and called daughter Caroline on the phone. Caroline told writer she could not talk and wanted to call later in the day. Writer and MD spoke to patient. She fells she would like to walk better in order to go home but feels her TCU is not good. She understands no one(TCU) will accept her as she has been through this.   Per previous conversation, Caroline would like to take patient home but needs a lift device, the only request.. Patient is also on tube feedings so would need family to be taught how to help with this. Patient's Dialysis unit is [facility] They would have to be notified of patient returning. Family works from home so she would get 24 hours support.   Follow up with Caroline.  Home investigation for tube feedings entered for possible home care discharge. NO lift has been ordered yet.  Writer did speak to Caroline. She would like to patient home this time, NO TCU. She would like to be taught how to do the tube feedings at home. Caroline didn't  give up the bedhold at [facility] until she talks to the MD and sees if she can handle everything. She  would like   Next Steps: follow for discharge planning, Home vs TCU. DME needed-Lift with sling for Amputee, home care. Home infusion investigation order placed.    Janki Watkins RN

## 2025-04-03 LAB
BACTERIA BLD CULT: NO GROWTH
BACTERIA BLD CULT: NO GROWTH
BACTERIA BLD CULT: NORMAL
BACTERIA BLD CULT: NORMAL
GLUCOSE BLDC GLUCOMTR-MCNC: 132 MG/DL (ref 70–99)
GLUCOSE BLDC GLUCOMTR-MCNC: 141 MG/DL (ref 70–99)
GLUCOSE BLDC GLUCOMTR-MCNC: 184 MG/DL (ref 70–99)
GLUCOSE BLDC GLUCOMTR-MCNC: 196 MG/DL (ref 70–99)
GLUCOSE BLDC GLUCOMTR-MCNC: 97 MG/DL (ref 70–99)

## 2025-04-03 PROCEDURE — 250N000013 HC RX MED GY IP 250 OP 250 PS 637: Performed by: STUDENT IN AN ORGANIZED HEALTH CARE EDUCATION/TRAINING PROGRAM

## 2025-04-03 PROCEDURE — 99232 SBSQ HOSP IP/OBS MODERATE 35: CPT | Performed by: STUDENT IN AN ORGANIZED HEALTH CARE EDUCATION/TRAINING PROGRAM

## 2025-04-03 PROCEDURE — 120N000001 HC R&B MED SURG/OB

## 2025-04-03 PROCEDURE — G0463 HOSPITAL OUTPT CLINIC VISIT: HCPCS

## 2025-04-03 RX ADMIN — APIXABAN 5 MG: 5 TABLET, FILM COATED ORAL at 21:04

## 2025-04-03 RX ADMIN — AMIODARONE HYDROCHLORIDE 200 MG: 200 TABLET ORAL at 09:27

## 2025-04-03 RX ADMIN — AMIODARONE HYDROCHLORIDE 200 MG: 200 TABLET ORAL at 21:04

## 2025-04-03 RX ADMIN — HYDRALAZINE HYDROCHLORIDE 75 MG: 25 TABLET ORAL at 21:04

## 2025-04-03 RX ADMIN — HYDRALAZINE HYDROCHLORIDE 75 MG: 25 TABLET ORAL at 13:03

## 2025-04-03 RX ADMIN — HYDRALAZINE HYDROCHLORIDE 75 MG: 25 TABLET ORAL at 06:22

## 2025-04-03 RX ADMIN — SEVELAMER CARBONATE 1600 MG: 800 TABLET, FILM COATED ORAL at 17:58

## 2025-04-03 RX ADMIN — Medication 50 MCG: at 09:27

## 2025-04-03 RX ADMIN — POLYETHYLENE GLYCOL 3350 17 G: 17 POWDER, FOR SOLUTION ORAL at 09:27

## 2025-04-03 RX ADMIN — AMLODIPINE BESYLATE 10 MG: 10 TABLET ORAL at 09:27

## 2025-04-03 RX ADMIN — Medication 40 MG: at 09:32

## 2025-04-03 RX ADMIN — ATORVASTATIN CALCIUM 20 MG: 20 TABLET, FILM COATED ORAL at 21:04

## 2025-04-03 RX ADMIN — INSULIN ASPART 1 UNITS: 100 INJECTION, SOLUTION INTRAVENOUS; SUBCUTANEOUS at 09:37

## 2025-04-03 RX ADMIN — INSULIN GLARGINE 12 UNITS: 100 INJECTION, SOLUTION SUBCUTANEOUS at 21:11

## 2025-04-03 RX ADMIN — SERTRALINE HYDROCHLORIDE 75 MG: 50 TABLET ORAL at 09:27

## 2025-04-03 RX ADMIN — SEVELAMER CARBONATE 1600 MG: 800 TABLET, FILM COATED ORAL at 13:03

## 2025-04-03 RX ADMIN — CETIRIZINE HYDROCHLORIDE 10 MG: 10 TABLET, FILM COATED ORAL at 23:00

## 2025-04-03 RX ADMIN — INSULIN GLARGINE 12 UNITS: 100 INJECTION, SOLUTION SUBCUTANEOUS at 09:37

## 2025-04-03 ASSESSMENT — ACTIVITIES OF DAILY LIVING (ADL)
ADLS_ACUITY_SCORE: 91
ADLS_ACUITY_SCORE: 87
ADLS_ACUITY_SCORE: 91
ADLS_ACUITY_SCORE: 91
ADLS_ACUITY_SCORE: 87
ADLS_ACUITY_SCORE: 91
ADLS_ACUITY_SCORE: 87
ADLS_ACUITY_SCORE: 91
ADLS_ACUITY_SCORE: 87
ADLS_ACUITY_SCORE: 91
ADLS_ACUITY_SCORE: 87
ADLS_ACUITY_SCORE: 87
ADLS_ACUITY_SCORE: 91
ADLS_ACUITY_SCORE: 91
ADLS_ACUITY_SCORE: 87
ADLS_ACUITY_SCORE: 87
ADLS_ACUITY_SCORE: 91
ADLS_ACUITY_SCORE: 91

## 2025-04-03 NOTE — PLAN OF CARE
Goal Outcome Evaluation:      Plan of Care Reviewed With: patient    Overall Patient Progress: no change     SUMMARY: Abdominal pain, Constipation     DATE & TIME: 04/02/2025 -04/03/2025 [id number]      Cognitive Concerns/ Orientation: A & O x4   BEHAVIOR & AGGRESSION TOOL COLOR: Green  ABNL VS/O2: VSS on 0.5-1L NC. Temp 100.3 - PRN Tylenol given. CPAP on at bedtime   MOBILITY: A x2 lift; T & R   PAIN MANAGMENT: Denied pain this shift   DIET: Renal, TF at 65cc/hr  from 6pm to 6am with 60cc flushes before and after  BOWEL/BLADDER: Incontinent of bowel, pt. On Dialysis still produces some urine  ABNL LAB/BG: Creat 3.95, Hgb 7.9, , 196  DRAIN/DEVICES: R PIV SL, Dialysis fistula on Left arm, G tube  TELEMETRY RHYTHM: NA  SKIN: Scattered bruises, left Buttock wound, scab to left stump- L AKA   TESTS/PROCEDURES: None this shift   D/C DATE: Pending     OTHER IMPORTANT INFO: ID/woc/ SW/ neph/ nutrition following.On intermittent vancomycin. Pt able to swallow small pills

## 2025-04-03 NOTE — PROGRESS NOTES
DATE & TIME:4/3/25, 9480-7061  Cognitive Concerns/ Orientation:A/Ox4  BEHAVIOR & AGGRESSION TOOL COLOR:Green  ABNL VS/O2:VSS on 1L nc  MOBILITY:Ax2, T/R  PAIN MANAGMENT:Denies  DIET: Renal, TF at 65cc/hr  from 6pm to 6am with 60cc flushes before and after; Poor Appetite; Nutrition recommending 200 Calories a day minimum   BOWEL/BLADDER:Incontinent of bowel, on dialysis  DRAIN/DEVICES:R piv SL, L fistula, Gj tube  SKIN: Scattered bruising, L AKA  TESTS/PROCEDURES:Bg 132  D/C DATE: Pending

## 2025-04-03 NOTE — PLAN OF CARE
Goal Outcome Evaluation:      Plan of Care Reviewed With: patient    Overall Patient Progress: no changeOverall Patient Progress: no change    SUMMARY: Abdominal pain, Constipation     DATE & TIME: 04/03/2025 [id number]      Cognitive Concerns/ Orientation: A & O x4   BEHAVIOR & AGGRESSION TOOL COLOR: Green  ABNL VS/O2: VSS on 0.5-1L NC; CPAP on at bedtime   MOBILITY: A x2 lift; T & R   PAIN MANAGMENT: Denied pain this shift   DIET: Renal, TF at 65cc/hr  from 6pm to 6am with 60cc flushes before and after; Poor Appetite; Nutrition recommending 200 Calories a day minimum   BOWEL/BLADDER: Incontinent of bowel, pt. On Dialysis still produces some urine  ABNL LAB/BG: See Chart  DRAIN/DEVICES: R PIV SL, Dialysis fistula on Left arm, G tube  TELEMETRY RHYTHM: NSR  SKIN: Scattered bruises, left Buttock wound, scab to left stump- L AKA   TESTS/PROCEDURES: None this shift   D/C DATE: Pending     OTHER IMPORTANT INFO: ID/woc/ SW/ neph/ nutrition following.On intermittent vancomycin. Pt able to swallow small pills     see above

## 2025-04-03 NOTE — PROGRESS NOTES
"[facility]    Medicine Progress Note - Hospitalist Service    Date of Admission:  3/27/2025    Assessment & Plan      Supriya Herr is a 75 year old female with very complex past medical history including end-stage renal disease on hemodialysis, HFpEF, paroxysmal atrial fibrillation, hypertension, hyperlipidemia, anxiety, depression, chronic anemia, dysphagia status post PEG tube, type 2 diabetes, left traumatic AKA and recent hospitalization with multifactorial respiratory failure now being admitted on 3/27/2025 with abdominal pain and retching. She is found to have e/o constipation. But other evaluation notable for elevated WBC, procal and CRP of unclear etiology.     Pf note, pt with multiple recent hospitalizations. Per last discharge summary:   \"Hospitalized at [facility] from 3/2 - 3/6/2025 with shortness of breath likely multifactorial.  Volume status was difficult to determine, underwent right heart cath on 3/5 showed mildly elevated right atrial and PCWP readings.  Was treated for acute respiratory failure likely from heart failure exacerbation, atrial fibrillation, end-stage renal disease and discharged to care facility with supplemental nocturnal oxygen.\"  \"Prolonged hospitalized at [facility] from 1/8 to 2/26/2025 for acute hypoxic respiratory failure multifactorial, was intubated ( 1/9-1/18), reintubated (1/20 - 1/25).  Then was treated for shock with pressors, influenza A pneumonia, hospital-acquired pneumonia with intravenous antibiotics, steroids, antivirals.  Then also noted to have heart failure exacerbation, recurrent A-fib with RVR, subsequent bradycardia.  Then also noted to have encephalopathy likely from infectious, metabolic etiology and delirium.  During that hospitalization was noted to have dysphagia, pharyngeal edema, was evaluated by ENT on 2/16.  No findings to explain dysphagia.  G-tube was placed by IR on 2/17 and was on nocturnal tube feeds.  Discharged to TCU for " "rehabilitation.\"    E. Faecalis bacteremia  Leukocytosis  Elevated procalcitonin   Elevated CRP  On admission, WBC elevated to 17.4, Procal elevated to 1.3, and CRP elevated to 39.78.   Unclear infectious source. She has some mild non-specific urinary bladder wall thickening, but with ESRD makes very little urine. Also has a new MIKAYLA 3mm nodule which may be infectious or inflammatory, but no respiratory complaints.   * COVID, flu RSV pending   *UA very abnormal but difficult to interpret in the setting of ESRD. Ultimately Ucx positive for E faecalis. Could be initial source or seeding from bacteremia.  * Bcx positive for E. Faecalis. Unclear source. Concern for endocarditis given how quickly blood cultures are coming back positive  *TTE without vegetation seen.  * TTE completed 4/1, results pending   - ID consulted for assistance   - Continue IV vancomycin   - Repeat Bcx until cleared, no growth since 3/29   - Per ID, plan to complete 6 week course with IV vancomycin.   - Will need to arrange outpatient antibiotics with dialysis.     Abdominal pain  Retching, with Hx of GERD   Dysphagia s/p PEG tube (2/1/2025)   Constipation, rectal stool ball   Pt presents to the ED with vague complaints of retching/dry heaving without vomiting and some crampy lower abdominal pain.   * CT scan showed large rectal stool ball without evidence of obstruction. There was also some non-specific mild urinary bladder wall thickening. No other intra-abdominal pathology.   - Unclear exactly what is going on. Pt did not have any retching or other symptoms while I was in the room. Perhaps she has some reflux of tube feeds or other esophageal issue.  - GI consulted for assessment:   - EGD on 3/30 without etiology to explain dysphagia   - XR Esophagram normal  - Continue Protonix 40 mg daily  - Continue bowel regimen to work on the constipation   - Zofran available PRN  * Pt still having these bouts of retching/coughing/dry heaving. Very unclear " why this is happening. I have not personally seen one of these episodes so its hard to say exactly what is going on. Will continue to monitor.     End-stage renal disease on HD   Dialyzes MWF via L arm fistula. Per prior notes, volume removal has often been limited by intra dialytic hypotension and a fib with RVR   *Noted that patient has been getting short of breath on Sundays in the past and there was discussion of adding a fourth dialysis day on Saturdays. As of now, nephrology challenging dry weight to see if that helps.  - Nephrology consulted   - Continue PTA midodrine MWF before dialysis   - Continue PTA calcitriol, renvela, vit D.     Heart failure with preserved ejection fraction  Markedly elevated BNP without clinical features of exacerbation   Paroxysmal atrial fibrillation on chronic anticoagulation  Hypertension  Hyperlipidemia  Troponin elevation, Chronic Non-ischemic Myocardial Injury due to chronic heart failure with preserved ejection fraction and end-stage renal disease (85->85), no treatment indicated   [PTA medications include: amiodarone 200mg BID, amlodipine 10mg daily, Apixaban 5mg BID, atorvastatin 20mg daily, hydralazine 75mg Q8H,   *TTE 1/21/2025 with LVEF of 60%.  No valvular abnormality   * RHC 3/5/25 showing moderately elevated right and left-sided filling pressure; elevated wedge pressure consistent with pulmonary hypertension  * On admission now, pt has markedly elevated BNP to 51,544 (though historically is in the 20,000-40,000 range), She does have small pleural effusions on CT, but no other overt evidence of volume overload or clinical HF. She is satting well on room air.  - I/Os and daily weights ordered  - continue PTA apixaban 5 mg BID  - continue PTA atorvastatin 20 mg daily  - continue PTA hydralazine 75 mg q8H  - continue PTA amiodarone 200 mg BID  - continue PTA amlodipine 10 m qday  - Volume management per nephrology  - Avoid using supplemental oxygen unless patient is  satting <90% on room air.    Vaginal bleeding   Pt has had intermittent vaginal bleeding this stay. Nursing and pt quite convinced it is in fact vaginal rather than rectal or urethral.   - transvaginal ultrasound ordered for further eval negative for any concerning findings.   - Recommend outpatient Gyn follow-up     Anxiety/depression/insomnia  - Continue PTA zoloft     Anemia of chronic disease  Hemoglobin 9.2 on admission, stable at 8.3 now  - Monitor CBC       Type II DM  - Continue PTA lantus 15 units BID   - MDSSI     Physical deconditioning from medical illness, senile frailty.  S/p left above-knee amputation, traumatic  Patient from TCU, daughter hopeful pt will be able to discharge back home with services rather than going back to TCU.   - PT/OT consulted   - Will plan for home discharge in the next day or two, will need home equipment      Sacrococcygeal Pressure injury, Stage 3, present on admission  Wound care team followed.  Pressure injury prevention.      Obesity with a BMI of 34.  Increase all-cause mortality and morbidity.          Diet: Adult Formula Drip Feeding: Continuous Grecia Pick1 Renal Support; Gastrostomy; Goal Rate: 65; mL/hr; From: 6:00 PM; To: 6:00 AM; 3/31: Decrease cycle time from 14 hrs to 12 hrs (6:00 pm to 6:00 am)  Renal Diet (dialysis)    DVT Prophylaxis: DOAC  Bradshaw Catheter: Not present  Lines: None     Cardiac Monitoring: None  Code Status: No CPR- Pre-arrest intubation OK      Clinically Significant Risk Factors          # Hypochloremia: Lowest Cl = 95 mmol/L in last 2 days, will monitor as appropriate      # Hypoalbuminemia: Lowest albumin = 3.2 g/dL at 3/29/2025 11:39 AM, will monitor as appropriate       # Hypertension: Noted on problem list  # Chronic heart failure with preserved ejection fraction: heart failure noted on problem list and last echo with EF >50%          # DMII: A1C = 6.5 % (Ref range: <5.7 %) within past 6 months   # Obesity: Estimated body mass index is  "34.91 kg/m  as calculated from the following:    Height as of this encounter: 1.676 m (5' 6\").    Weight as of this encounter: 98.1 kg (216 lb 4.3 oz).        # Financial/Environmental Concerns:           Social Drivers of Health    Tobacco Use: Medium Risk (3/30/2025)    Patient History     Smoking Tobacco Use: Former     Smokeless Tobacco Use: Never   Physical Activity: Insufficiently Active (7/19/2024)    Exercise Vital Sign     Days of Exercise per Week: 1 day     Minutes of Exercise per Session: 10 min   Social Connections: Unknown (7/19/2024)    Social Connection and Isolation Panel [NHANES]     Frequency of Social Gatherings with Friends and Family: More than three times a week          Disposition Plan     Medically Ready for Discharge: Anticipated in 2-4 Days pending evaluation of bacteremia             Ekaterina Gayle MD  Hospitalist Service  [facility]  Securely message with [employer] (more info)  Text page via [employer] Paging/Directory   ______________________________________________________________________    Interval History   Pt doing well today. Sleepy. Reported a retching, dry heaving epidose this morning. Cannot really say what the trigger was. Feels fine now.       Physical Exam   Vital Signs: Temp: 98.2  F (36.8  C) Temp src: Oral BP: (!) 152/45 Pulse: 68   Resp: 16 SpO2: 100 % O2 Device: Nasal cannula Oxygen Delivery: 1 LPM  Weight: 216 lbs 4.34 oz  Constitutional: Awake, alert, cooperative, no apparent distress.  Appears chronically ill.  Pulmonary: No increased work of breathing, good air exchange, clear to auscultation bilaterally, no crackles or wheezing.  Cardiovascular: Regular rate and rhythm, normal S1 and S2, no S3 or S4. No murmurs, rubs, or gallops noted.  GI: Normal bowel sounds, soft, non-distended, mild tenderness of abdomen generally.  PEG tube in place. No guarding or rebound.  Skin/Integumen: No cyanosis or jaundice. No rashes noted.  Extremities: No " lower extremity edema. L AKA.   Neuro: A&Ox4. Conversant. Responds appropriately in conversation. Moves all extremities with no focal deficit identified.      Medical Decision Making     45 MINUTES SPENT BY ME on the date of service doing chart review, history, exam, documentation & further activities per the note.      Data   ------------------------- PAST 24 HR DATA REVIEWED -----------------------------------------------        Imaging results reviewed over the past 24 hrs:   No results found for this or any previous visit (from the past 24 hours).

## 2025-04-03 NOTE — PROGRESS NOTES
[facility]    Received request for benefit check should pt require home TF.  Supriya has coverage for enteral nutrition through [facility] [employer] [facility], covered at [place]     Thank you     Celeste Miner RN  [facility] Liaison  [phone] (Mon thru Fri 8am - 5pm)  [phone] Office

## 2025-04-03 NOTE — PROGRESS NOTES
"CLINICAL NUTRITION SERVICES - REASSESSMENT NOTE    RECOMMENDATIONS FOR MDs/PROVIDERS:  Pt w/ inadequate po intake to wean TF at this time    Registered Dietitian Interventions:  - Continue EN support, as ordered  - This meets 89% energy and 100% PRO needs   - Spoke w/ RN to help encourage pt intake to make up energy deficit     INFORMATION OBTAINED  Assessed patient in room.     CURRENT NUTRITION ORDERS  Diet: Orders Placed This Encounter      Renal Diet (dialysis)  Snacks/Supplements: Per RD note 3/11 \"She dislikes supplements - \"I won't drink them\"\"   Nutrition Support: Grecia Estrella Renal @ goal of 65 ml x 12 hours  (from 1800 to 0600 daily) will provide: 780 ml formula, 1404 kcals, 62 g PRO, 134 g CHO, 16 g fiber, and 515 ml free H2O daily.     + ProSource TF20 once daily (80 kcal and 20 g protein)  Total (TF + ProSource TF20)= 1484 kcal (22 kcal/kg), 82 g protein (1.2 g/kg),   - This meets 89% energy and 100% PRO needs     Dosing Weight: 66.8 kg, based on adjusted wt based on lowest, admit wt    ASSESSED NUTRITION NEEDS  Estimated Energy Needs: 7829-1522 kcals/day (25 - 35 kcals/kg)  Justification: HD  Estimated Protein Needs:  grams protein/day (1.2 - 1.8 grams of pro/kg)  Justification: HD (w/ DM)  Estimated Fluid Needs: 1 mL/kcal   Justification: Per provider pending fluid status      MALNUTRITION  % Intake: CALLIE Hx - do not suspect as pt receives EN support  % Weight Loss: Unable to assess  - up to 3% wt loss in 10 days, pending dry wt but question fluid status   Subcutaneous Fat Loss: None observed  Muscle Loss: Temples (temporalis muscle): Mild and Clavicles (pectoralis and deltoids): Mild  Fluid Accumulation/Edema:  None noted  Malnutrition Diagnosis: Patient does not meet two of the above criteria necessary for diagnosing malnutrition  Malnutrition Present on Admission: No    CURRENT INTAKE/TOLERANCE  Approximate 3 day average Oral Intake for:    3/31-4/2/25  Calories: 228 kcal (14% estimated " "needs)  Protein: 8 grams (12% estimated needs)    Approximate Oral Intake for:    4/2/25  Calories: 191 kcal  Protein: 8 grams   Approximate Oral Intake for:    4/1/25  Calories: 192 kcal   Protein: 0 grams   Approximate Oral Intake for:    3/31/25  Calories: 302 kcal  Protein: 15 grams      NEW FINDINGS  - Pt was resting in bed and woke up to name. She endorsed the poor intakes over the past few days. She said her stomach is always hurting. She's been letting the RN and/or MD know to adjust any meds they can. When asked if there was anything we can do from a nutrition perspective she said \"no,\" re-iterated her stomach hurt and wanted to rest. She was made aware the TF would continue providing most the nutrition she needs  - Pt w/ inadequate PO intake to wean EN support at this time. Messaged w/ MD via [employer] who agreed pt likely not increasing PO intake enough and we'll plan to continue EN support as ordered, meeting 88% energy and 100% PRO needs   Nutrition-relevant labs: 4/2: BUN 55.4 (H), Cr 3.95 (H), GFR 11 (L), Lytes NL  Nutrition-relevant medications: medium sliding scale insulin w/ meals and q PM, Lantus 12 units BID, Miralax/Senna BID (held), Renvela, D-3  GI symptoms: Reviewed  Skin/wounds: WOCN following for a IAD wound   Weight: trend up   - Admission: 89.9 kg (198 lb 3.1 oz) (03/27/25 2132)   - Most Recent: 98.1 kg (216 lb 4.3 oz) (04/02/25 1445)    EVALUATION OF THE PROGRESS TOWARD GOALS   Previous Goals  EN - goal rate to provide >85% energy and 100% PRO needs until able to further increase PO intake  Evaluation: Met  PO - increase as able, need for at least >60% to discontinue EN support  Evaluation: Not progressing    Previous Nutrition Diagnosis  Increased nutrient needs protein  related to hemodialysis while hospitalized as evidenced by need for at least 1.2 g/kg/d PRO and need for EN support with protein modulars to meet nutrition needs until able to take adequate PO intake.   Evaluation: Not " progressing --> Updated below    NUTRITION DIAGNOSIS:  Inadequate oral intake related to altered GI functioning as evidenced by pt reports stomach pain as a barrier to PO intake and need to increase EN support to meet >90% estimated needs until able to increase PO intake.    INTERVENTIONS  Collaboration by nutrition professional with other providers  Enteral nutrition management    Goals  EN - goal rate to meet at least 89% energy and 100% PRO needs  PO - to make up EN support energy deficit    Monitoring/Evaluation      Progress toward goals will be monitored and evaluated per policy.

## 2025-04-04 ENCOUNTER — MEDICAL CORRESPONDENCE (OUTPATIENT)
Dept: HEALTH INFORMATION MANAGEMENT | Facility: CLINIC | Age: 75
End: 2025-04-04

## 2025-04-04 VITALS
OXYGEN SATURATION: 100 % | RESPIRATION RATE: 16 BRPM | HEIGHT: 66 IN | WEIGHT: 216.27 LBS | TEMPERATURE: 99.7 F | BODY MASS INDEX: 34.76 KG/M2 | SYSTOLIC BLOOD PRESSURE: 168 MMHG | HEART RATE: 74 BPM | DIASTOLIC BLOOD PRESSURE: 50 MMHG

## 2025-04-04 LAB
ANION GAP SERPL CALCULATED.3IONS-SCNC: 10 MMOL/L (ref 7–15)
BACTERIA BLD CULT: NO GROWTH
BACTERIA BLD CULT: NO GROWTH
BUN SERPL-MCNC: 56.8 MG/DL (ref 8–23)
CALCIUM SERPL-MCNC: 10.3 MG/DL (ref 8.8–10.4)
CHLORIDE SERPL-SCNC: 92 MMOL/L (ref 98–107)
CREAT SERPL-MCNC: 3.83 MG/DL (ref 0.51–0.95)
EGFRCR SERPLBLD CKD-EPI 2021: 12 ML/MIN/1.73M2
ERYTHROCYTE [DISTWIDTH] IN BLOOD BY AUTOMATED COUNT: 15 % (ref 10–15)
GLUCOSE BLDC GLUCOMTR-MCNC: 120 MG/DL (ref 70–99)
GLUCOSE BLDC GLUCOMTR-MCNC: 133 MG/DL (ref 70–99)
GLUCOSE BLDC GLUCOMTR-MCNC: 83 MG/DL (ref 70–99)
GLUCOSE BLDC GLUCOMTR-MCNC: 87 MG/DL (ref 70–99)
GLUCOSE SERPL-MCNC: 214 MG/DL (ref 70–99)
HBV SURFACE AB SERPL IA-ACNC: <3.5 M[IU]/ML
HBV SURFACE AB SERPL IA-ACNC: NONREACTIVE M[IU]/ML
HBV SURFACE AG SERPL QL IA: NONREACTIVE
HCO3 SERPL-SCNC: 29 MMOL/L (ref 22–29)
HCT VFR BLD AUTO: 25.3 % (ref 35–47)
HGB BLD-MCNC: 7.8 G/DL (ref 11.7–15.7)
MCH RBC QN AUTO: 29.3 PG (ref 26.5–33)
MCHC RBC AUTO-ENTMCNC: 30.8 G/DL (ref 31.5–36.5)
MCV RBC AUTO: 95 FL (ref 78–100)
PLATELET # BLD AUTO: 191 10E3/UL (ref 150–450)
POTASSIUM SERPL-SCNC: 4.2 MMOL/L (ref 3.4–5.3)
RBC # BLD AUTO: 2.66 10E6/UL (ref 3.8–5.2)
SODIUM SERPL-SCNC: 131 MMOL/L (ref 135–145)
VANCOMYCIN SERPL-MCNC: 16.7 UG/ML
WBC # BLD AUTO: 8.6 10E3/UL (ref 4–11)

## 2025-04-04 PROCEDURE — 250N000011 HC RX IP 250 OP 636: Performed by: STUDENT IN AN ORGANIZED HEALTH CARE EDUCATION/TRAINING PROGRAM

## 2025-04-04 PROCEDURE — 80048 BASIC METABOLIC PNL TOTAL CA: CPT | Performed by: STUDENT IN AN ORGANIZED HEALTH CARE EDUCATION/TRAINING PROGRAM

## 2025-04-04 PROCEDURE — 90935 HEMODIALYSIS ONE EVALUATION: CPT | Performed by: INTERNAL MEDICINE

## 2025-04-04 PROCEDURE — 120N000001 HC R&B MED SURG/OB

## 2025-04-04 PROCEDURE — 85018 HEMOGLOBIN: CPT | Performed by: STUDENT IN AN ORGANIZED HEALTH CARE EDUCATION/TRAINING PROGRAM

## 2025-04-04 PROCEDURE — 250N000011 HC RX IP 250 OP 636: Performed by: INTERNAL MEDICINE

## 2025-04-04 PROCEDURE — 87340 HEPATITIS B SURFACE AG IA: CPT | Performed by: INTERNAL MEDICINE

## 2025-04-04 PROCEDURE — 634N000001 HC RX 634: Mod: JZ | Performed by: INTERNAL MEDICINE

## 2025-04-04 PROCEDURE — G0463 HOSPITAL OUTPT CLINIC VISIT: HCPCS

## 2025-04-04 PROCEDURE — 258N000003 HC RX IP 258 OP 636: Performed by: STUDENT IN AN ORGANIZED HEALTH CARE EDUCATION/TRAINING PROGRAM

## 2025-04-04 PROCEDURE — 80202 ASSAY OF VANCOMYCIN: CPT | Performed by: STUDENT IN AN ORGANIZED HEALTH CARE EDUCATION/TRAINING PROGRAM

## 2025-04-04 PROCEDURE — 250N000012 HC RX MED GY IP 250 OP 636 PS 637: Performed by: STUDENT IN AN ORGANIZED HEALTH CARE EDUCATION/TRAINING PROGRAM

## 2025-04-04 PROCEDURE — 258N000003 HC RX IP 258 OP 636: Performed by: INTERNAL MEDICINE

## 2025-04-04 PROCEDURE — 36415 COLL VENOUS BLD VENIPUNCTURE: CPT | Performed by: STUDENT IN AN ORGANIZED HEALTH CARE EDUCATION/TRAINING PROGRAM

## 2025-04-04 PROCEDURE — 250N000013 HC RX MED GY IP 250 OP 250 PS 637: Performed by: STUDENT IN AN ORGANIZED HEALTH CARE EDUCATION/TRAINING PROGRAM

## 2025-04-04 PROCEDURE — 90937 HEMODIALYSIS REPEATED EVAL: CPT

## 2025-04-04 PROCEDURE — 86706 HEP B SURFACE ANTIBODY: CPT | Performed by: INTERNAL MEDICINE

## 2025-04-04 PROCEDURE — 99254 IP/OBS CNSLTJ NEW/EST MOD 60: CPT | Performed by: REGISTERED NURSE

## 2025-04-04 PROCEDURE — 250N000009 HC RX 250: Mod: JW | Performed by: INTERNAL MEDICINE

## 2025-04-04 PROCEDURE — 99232 SBSQ HOSP IP/OBS MODERATE 35: CPT | Performed by: STUDENT IN AN ORGANIZED HEALTH CARE EDUCATION/TRAINING PROGRAM

## 2025-04-04 RX ORDER — HEPARIN SODIUM 1000 [USP'U]/ML
500 INJECTION, SOLUTION INTRAVENOUS; SUBCUTANEOUS CONTINUOUS
Status: DISCONTINUED | OUTPATIENT
Start: 2025-04-04 | End: 2025-04-04

## 2025-04-04 RX ORDER — ALBUMIN (HUMAN) 12.5 G/50ML
50 SOLUTION INTRAVENOUS
Status: DISCONTINUED | OUTPATIENT
Start: 2025-04-04 | End: 2025-04-04

## 2025-04-04 RX ADMIN — INSULIN GLARGINE 12 UNITS: 100 INJECTION, SOLUTION SUBCUTANEOUS at 07:41

## 2025-04-04 RX ADMIN — HEPARIN SODIUM 500 UNITS/HR: 1000 INJECTION, SOLUTION INTRAVENOUS; SUBCUTANEOUS at 09:26

## 2025-04-04 RX ADMIN — ACETAMINOPHEN 650 MG: 325 TABLET, FILM COATED ORAL at 20:38

## 2025-04-04 RX ADMIN — SENNOSIDES 2 TABLET: 8.6 TABLET ORAL at 20:26

## 2025-04-04 RX ADMIN — ONDANSETRON 4 MG: 2 INJECTION, SOLUTION INTRAMUSCULAR; INTRAVENOUS at 07:48

## 2025-04-04 RX ADMIN — SEVELAMER CARBONATE 1600 MG: 800 TABLET, FILM COATED ORAL at 12:39

## 2025-04-04 RX ADMIN — Medication: at 09:27

## 2025-04-04 RX ADMIN — APIXABAN 5 MG: 5 TABLET, FILM COATED ORAL at 12:40

## 2025-04-04 RX ADMIN — HEPARIN SODIUM 500 UNITS: 1000 INJECTION, SOLUTION INTRAVENOUS; SUBCUTANEOUS at 09:25

## 2025-04-04 RX ADMIN — Medication 50 MCG: at 12:40

## 2025-04-04 RX ADMIN — EPOETIN ALFA-EPBX 4000 UNITS: 4000 INJECTION, SOLUTION INTRAVENOUS; SUBCUTANEOUS at 09:25

## 2025-04-04 RX ADMIN — CALCITRIOL CAPSULES 0.25 MCG 0.25 MCG: 0.25 CAPSULE ORAL at 12:40

## 2025-04-04 RX ADMIN — AMIODARONE HYDROCHLORIDE 200 MG: 200 TABLET ORAL at 20:27

## 2025-04-04 RX ADMIN — SEVELAMER CARBONATE 1600 MG: 800 TABLET, FILM COATED ORAL at 19:06

## 2025-04-04 RX ADMIN — Medication 40 MG: at 12:41

## 2025-04-04 RX ADMIN — AMIODARONE HYDROCHLORIDE 200 MG: 200 TABLET ORAL at 12:40

## 2025-04-04 RX ADMIN — INSULIN ASPART 2 UNITS: 100 INJECTION, SOLUTION INTRAVENOUS; SUBCUTANEOUS at 07:40

## 2025-04-04 RX ADMIN — ATORVASTATIN CALCIUM 20 MG: 20 TABLET, FILM COATED ORAL at 20:27

## 2025-04-04 RX ADMIN — INSULIN GLARGINE 12 UNITS: 100 INJECTION, SOLUTION SUBCUTANEOUS at 22:43

## 2025-04-04 RX ADMIN — SODIUM CHLORIDE 200 ML: 0.9 INJECTION, SOLUTION INTRAVENOUS at 09:26

## 2025-04-04 RX ADMIN — MIDODRINE HYDROCHLORIDE 5 MG: 5 TABLET ORAL at 12:39

## 2025-04-04 RX ADMIN — SODIUM CHLORIDE 250 ML: 0.9 INJECTION, SOLUTION INTRAVENOUS at 09:27

## 2025-04-04 RX ADMIN — HYDRALAZINE HYDROCHLORIDE 75 MG: 25 TABLET ORAL at 12:39

## 2025-04-04 RX ADMIN — IRON SUCROSE 50 MG: 20 INJECTION, SOLUTION INTRAVENOUS at 09:25

## 2025-04-04 RX ADMIN — SERTRALINE HYDROCHLORIDE 75 MG: 50 TABLET ORAL at 12:40

## 2025-04-04 RX ADMIN — HYDRALAZINE HYDROCHLORIDE 75 MG: 25 TABLET ORAL at 05:58

## 2025-04-04 RX ADMIN — HYDRALAZINE HYDROCHLORIDE 75 MG: 25 TABLET ORAL at 20:30

## 2025-04-04 RX ADMIN — HEPARIN SODIUM 500 UNITS/HR: 1000 INJECTION, SOLUTION INTRAVENOUS; SUBCUTANEOUS at 09:25

## 2025-04-04 RX ADMIN — VANCOMYCIN HYDROCHLORIDE 1500 MG: 10 INJECTION, POWDER, LYOPHILIZED, FOR SOLUTION INTRAVENOUS at 14:44

## 2025-04-04 RX ADMIN — APIXABAN 5 MG: 5 TABLET, FILM COATED ORAL at 20:27

## 2025-04-04 RX ADMIN — CETIRIZINE HYDROCHLORIDE 10 MG: 10 TABLET, FILM COATED ORAL at 22:43

## 2025-04-04 RX ADMIN — LIDOCAINE HYDROCHLORIDE 0.5 ML: 10 INJECTION, SOLUTION EPIDURAL; INFILTRATION; INTRACAUDAL; PERINEURAL at 09:26

## 2025-04-04 RX ADMIN — SODIUM CHLORIDE 500 ML: 0.9 INJECTION, SOLUTION INTRAVENOUS at 09:27

## 2025-04-04 ASSESSMENT — ACTIVITIES OF DAILY LIVING (ADL)
ADLS_ACUITY_SCORE: 87

## 2025-04-04 NOTE — CONSULTS
Palliative Care Consultation Note  [facility]      Patient: Supriya Herr  Date of Admission:  3/27/2025    Requesting Clinician / Team: Dr Gayle/Medicine  Reason for consult: Goals of care     Recommendations & Counseling     GOALS OF CARE:   Restorative with limits   Long discussion with daughter on phone. She wanted a better understanding of her mother's condition and what to expect. Reviewed that she has been on dialysis for 5 years and with frequent hospitalizations occurring this may signal that her condition is beginning to decline and may continue on this trajectory in the months ahead. Caroline feels that much of Supriya's medical problems have come from the poor care at the TCU settings she has been in over the past few months. I explained that over time it is often more difficult for those with serious illness to remain medically stable outside of the hospital setting. Caroline understands but does not feel her mother is at this point right now.   Caroline's goal is to avoid TCU settings and bring her mother home with her. She is working with  for discharge planning to get the homecare and equipment she will need.      ADVANCE CARE PLANNING:  No health care directive on file. Per system policy, Surrogate Decision-makers for Patients With Diminished Decision-making Capacity offers guidance on possible decision-makers. Deneen Velazquez has been identified as a surrogate decision maker.   There is a POLST form on file, this was reviewed and current.  Code status: No CPR- Pre-arrest intubation OK    MEDICAL MANAGEMENT:   We are not actively managing symptoms at this time.    PSYCHOSOCIAL/SPIRITUAL SUPPORT:  Family daughter Caroline and grandchildren    Palliative Care will not continue to follow. Thank you for the consult and allowing us to aid in the care of Supriya Herr.    Reviewed patient case with hospitalist Dr Gayle and HIGINIO Wells, CNS  Securely message with [employer]  "info)  Text page via [facility] Paging/Directory   \"[facility] [place]\"     Palliative Summary/HPI     Supriya Herr is a 75 year old female with a past medical history of ESRD on HD, a-fib, HTN HLD, HFpEF, anemia, dysphagia s/p PEG, anxiety/depression who presented on 3/27/25 with abdominal pain and retching.  Upon further evaluation she was found to be constipated, elevated WBC, procal, and CRP of unclear etiology. The plan is to complete a 6 week course of IV vancomycin.    Today, the patient was seen for:  Goals of care    Palliative Care Summary:   Spoke with patient's daughter on phone.   Introduced the role of palliative care as an interdisciplinary team that cares for patients with serious illness to help support symptom management, communication, coping for patients and their families as well as support with medical decision making.    Prognosis, Goals, & Planning:    Functional Status just prior to this current hospitalization:  has been hospitalized 4 times in the past 6 months for recurrent respiratory compromise/infections. Prior to admission patient has moved to TCU from hospital and back again for several weeks for higher level of daily care needs.  Outpatient Palliative Performance Score (PPS) 30%  Extensive disease. Bedbound & requires total care, normal or reduced intake. Normal LOC or drowsy or confused.   ECOG4 (Completely disabled and dependent on others for selfcare; bedbound)    Prognosis, Goals, and/or Advance Care Planning:  We discussed general treatment options (full/restorative, selective/conservatives, and comfort only/hospice). We then discussed how these specifically apply to Supriya's current medical condition.    Discussed what continuing restorative/life-prolonging care entails, including continued (re)admissions to the hospital, continuing with preventative and primary care, seeing disease/organ specific specialty consultations for medical treatments in hopes to prolong life " for as long as possible.    Long discussion with daughter on phone. She wanted a better understanding of her mother's condition and what to expect. Reviewed that she has been on dialysis for 5 years and with frequent hospitalizations occurring this may signal that her condition is beginning to decline and may continue on this trajectory in the months ahead. Caroline feels that much of Talons medical problems have come from the poor care at the TCU settings she has been in over the past few months. I explained that it is often more difficult for a patient to remain medically stable outside of the hospital setting   Reviewed  CHF and ESRD and may often be difficult to treat together with diuresis vs irritating the kidneys and that with any treatment there is likely negative effects on other organ systems.  It was explained to Caroline that comfort care is a good option when the burdens of aggressive treatments outweigh the benefits and are unwanted by patient, or not in their best interest. Comfort care focuses on optimizing quality of life and relief from distressing symptoms such as pain, shortness of breath, nausea, and anxiety. The aim is to focus on symptom management, not reversal or treatment of underlying medical conditions. It was explained that hospice is a service that provides comfort care outside the hospital setting.   Code Status was addressed today:   Yes, We discussed potential risks and rationale of attempting cardiac resuscitation, intubation, and mechanical ventilation.  We also discussed probability of survival as well as quality of life implications.  Based on this discussion, patient or surrogate response/decision: No CPR; intubation Okay.      Patient's decision making preferences: not assessed             Coping, Meaning, & Spirituality:   Mood, coping, and/or meaning in the context of serious illness were addressed today: Yes    Social:   Living situation: has been in and out of TCU/Hospital  settings  Important relationships/caregivers: chalino Velazquez  Areas of fulfillment/veronica: grandchildren    Medications:  I have reviewed this patient's medication profile and medications from this hospitalization. Notable medications:     Noted scheduled meds are:  Current Facility-Administered Medications   Medication Dose Route Frequency Provider Last Rate Last Admin    - MEDICATION INSTRUCTIONS for Dialysis Patients -   Does not apply See Admin Instructions Ekaterina Gayle MD        amiodarone (PACERONE) tablet 200 mg  200 mg Oral or Feeding Tube BID Ekaterina Gayle MD   200 mg at 04/03/25 2104    amLODIPine (NORVASC) tablet 10 mg  10 mg Oral or Feeding Tube Once per day on Sunday Tuesday Thursday Saturday Ekaterina Gayle MD   10 mg at 04/03/25 0927    apixaban ANTICOAGULANT (ELIQUIS) tablet 5 mg  5 mg Oral or Feeding Tube BID Ekaterina Gayle MD   5 mg at 04/03/25 2104    atorvastatin (LIPITOR) tablet 20 mg  20 mg Oral or Feeding Tube QPM Ekaterina Gayle MD   20 mg at 04/03/25 2104    calcitRIOL (ROCALTROL) capsule 0.25 mcg  0.25 mcg Oral Once per day on Monday Wednesday Friday Ekaterina Gayle MD   0.25 mcg at 04/02/25 1329    cetirizine (zyrTEC) tablet 10 mg  10 mg Oral or Feeding Tube At Bedtime Ekaterina Gayle MD   10 mg at 04/03/25 2300    hydrALAZINE (APRESOLINE) tablet 75 mg  75 mg Oral or Feeding Tube Q8H Ekaterina Gayle MD   75 mg at 04/04/25 0558    insulin aspart (NovoLOG) injection (RAPID ACTING)  1-7 Units Subcutaneous TID AC Ekaterina Gayle MD   2 Units at 04/04/25 0740    insulin aspart (NovoLOG) injection (RAPID ACTING)  1-5 Units Subcutaneous At Bedtime Ekaterina Gayle MD        insulin glargine (LANTUS PEN) injection 12 Units  12 Units Subcutaneous BID Ekaterina Gayle MD   12 Units at 04/04/25 0741    midodrine (PROAMATINE) tablet 5 mg  5 mg Oral Once per day on Monday Wednesday Friday Ekaterina Gayle MD   5 mg at 03/31/25 0802    pantoprazole  (PROTONIX) 2 mg/mL suspension 40 mg  40 mg Per Feeding Tube QAM AC Ekaterina Gayle MD   40 mg at 04/03/25 0932    polyethylene glycol (MIRALAX) Packet 17 g  17 g Oral or Feeding Tube BID Ekaterina Gayle MD   17 g at 04/03/25 0927    [Held by provider] Prosource TF20 ENfit Compatibl EN LIQD (PROSOURCE TF20) packet 60 mL  1 packet Per Feeding Tube Daily Ekaterina Gayle MD   60 mL at 03/31/25 1407    sennosides (SENOKOT) tablet 2 tablet  2 tablet Oral BID Ekaterina Gayle MD   2 tablet at 04/01/25 2115    sertraline (ZOLOFT) tablet 75 mg  75 mg Oral or Feeding Tube Daily Ekaterina Gayle MD   75 mg at 04/03/25 0927    sevelamer carbonate (RENVELA) tablet 1,600 mg  1,600 mg Oral TID w/meals Ekaterina Gayle MD   1,600 mg at 04/03/25 1758    sodium chloride (PF) 0.9% PF flush 3 mL  3 mL Intracatheter Q8H VÍCTOR Ekaterina Gayle MD   3 mL at 04/04/25 0600    vancomycin (VANCOCIN) 1,500 mg in 0.9% NaCl 265 mL intermittent infusion  1,500 mg Intravenous Once Ekaterina Gayle MD        vancomycin place cardoso - receiving intermittent dosing  1 each Intravenous See Admin Instructions Ekaterina Gayle MD        vitamin D3 (CHOLECALCIFEROL) tablet 50 mcg  50 mcg Oral or Feeding Tube Daily Ekaterina Gayle MD   50 mcg at 04/03/25 0927       Noted PRN meds are:  Current Facility-Administered Medications   Medication Dose Route Frequency Provider Last Rate Last Admin    acetaminophen (TYLENOL) tablet 650 mg  650 mg Oral Q4H PRN Ekaterina Gayle MD   650 mg at 04/02/25 2006    albuterol (PROVENTIL HFA/VENTOLIN HFA) inhaler  2 puff Inhalation Q6H PRN Ekaterina Gayle MD        benzocaine-menthol (CHLORASEPTIC) 6-10 MG lozenge 1 lozenge  1 lozenge Buccal TID PRN Dilan Torres MD        bisacodyl (DULCOLAX) suppository 10 mg  10 mg Rectal Daily PRN Ekaterina Gayle MD        cloNIDine (CATAPRES) tablet 0.1 mg  0.1 mg Oral Q4H PRN Truman Guerrero DO        dextrose 10% infusion    Intravenous Continuous PRN Ekaterina Gayle MD        glucose gel 15-30 g  15-30 g Oral Q15 Min PRN Ekaterina Gayle MD        Or    dextrose 50 % injection 25-50 mL  25-50 mL Intravenous Q15 Min PRN Ekaterina Gayle MD        Or    glucagon injection 1 mg  1 mg Subcutaneous Q15 Min PRN Ekaterina Gayle MD        hydrALAZINE (APRESOLINE) injection 5 mg  5 mg Intravenous Q4H PRN Ekaterina Gayle MD        HYDROmorphone (DILAUDID) tablet 2 mg  2 mg Oral Q4H PRN Ekaterina Gayle MD   2 mg at 03/27/25 2226    ipratropium - albuterol 0.5 mg/2.5 mg/3 mL (DUONEB) neb solution 3 mL  3 mL Nebulization Q4H PRN Ekaterina Gayle MD        labetalol (NORMODYNE/TRANDATE) injection 10 mg  10 mg Intravenous Q2H PRN Truman Guerrero DO   10 mg at 03/28/25 0135    miconazole (MICATIN) 2 % powder   Topical BID PRN Ekaterina Gayle MD        naloxone (NARCAN) injection 0.2 mg  0.2 mg Intravenous Q2 Min PRN Ekaterina Gayle MD        Or    naloxone (NARCAN) injection 0.4 mg  0.4 mg Intravenous Q2 Min PRN Ekaterina Gayle MD        Or    naloxone (NARCAN) injection 0.2 mg  0.2 mg Intramuscular Q2 Min PRN Ekaterina Gayle MD        Or    naloxone (NARCAN) injection 0.4 mg  0.4 mg Intramuscular Q2 Min PRN Ekaterina Gayle MD        ondansetron (ZOFRAN ODT) ODT tab 4 mg  4 mg Oral Q6H PRN Ekaterina Gayle MD        Or    ondansetron (ZOFRAN) injection 4 mg  4 mg Intravenous Q6H PRN Ekaterina Gayle MD   4 mg at 04/04/25 0748    prochlorperazine (COMPAZINE) injection 5 mg  5 mg Intravenous Q6H PRN Ekaterina Gayle MD        Or    prochlorperazine (COMPAZINE) tablet 5 mg  5 mg Oral Q6H Ekaterina Magana MD        senna-docusate (SENOKOT-S/PERICOLACE) 8.6-50 MG per tablet 1 tablet  1 tablet Oral BID Ekaterina Magana MD        Or    senna-docusate (SENOKOT-S/PERICOLACE) 8.6-50 MG per tablet 2 tablet  2 tablet Oral BID Ekaterina Magana MD   2 tablet at 03/28/25 0136    sodium  chloride (PF) 0.9% PF flush 3 mL  3 mL Intracatheter q1 min Ekaterina Phoenix MD         ROS:  Comprehensive ROS is  reviewed and is negative except as here & per HPI:     PHYSICAL EXAM:  Vital Signs: Temp: 98.3  F (36.8  C) Temp src: Oral BP: (!) 114/38 Pulse: 68   Resp: 24 SpO2: 99 % O2 Device: Nasal cannula Oxygen Delivery: 1 LPM  Wt Readings from Last 4 Encounters:   04/02/25 98.1 kg (216 lb 4.3 oz)   03/25/25 92.5 kg (204 lb)   03/17/25 92.7 kg (204 lb 5.9 oz)   03/06/25 101.8 kg (224 lb 6.9 oz)     Lab Results   Component Value Date    ALBUMIN 3.2 03/29/2025    ALBUMIN 3.1 11/22/2021    ALBUMIN 2.7 06/08/2020     GENERAL:  Alert, no  distress, chronically ill appearing.  HEAD: Normocephalic atraumatic  NEUROLOGIC: Alert.  PSYCH: Calm    Data reviewed:  Lab Results   Component Value Date    WBC 8.6 04/04/2025    WBC 8.3 04/02/2025    WBC 7.9 03/31/2025    HGB 7.8 (L) 04/04/2025    HGB 7.9 (L) 04/02/2025    HGB 8.3 (L) 03/31/2025    HCT 25.3 (L) 04/04/2025    HCT 24.7 (L) 04/02/2025    HCT 25.8 (L) 03/31/2025     04/04/2025     04/02/2025     03/31/2025     (L) 04/04/2025     04/02/2025     03/31/2025    POTASSIUM 4.2 04/04/2025    POTASSIUM 4.4 04/02/2025    POTASSIUM 3.7 03/31/2025    CHLORIDE 92 (L) 04/04/2025    CHLORIDE 95 (L) 04/02/2025    CHLORIDE 96 (L) 03/31/2025    CO2 29 04/04/2025    CO2 27 04/02/2025    CO2 27 03/31/2025    BUN 56.8 (H) 04/04/2025    BUN 55.4 (H) 04/02/2025    BUN 81.4 (H) 03/31/2025    CR 3.83 (H) 04/04/2025    CR 3.95 (H) 04/02/2025    CR 5.00 (H) 03/31/2025     (H) 04/04/2025     (H) 04/04/2025     (H) 04/03/2025     (H) 06/03/2024    SED 84 (H) 07/31/2018    SED 97 (H) 07/24/2018    DD 1.03 (H) 08/19/2021    DD 0.69 (H) 08/18/2021    DD 0.80 (H) 08/17/2021    NTBNPI 51,544 (H) 03/27/2025    NTBNPI 28,540 (H) 03/13/2025    NTBNPI 33,144 (H) 03/09/2025    TROPI <0.012 01/02/2014    TROPI <0.012 01/01/2014     TROPI <0.012 01/01/2014    AST 12 03/29/2025    AST 11 03/28/2025    AST 16 03/27/2025    ALT 14 03/29/2025    ALT 16 03/28/2025    ALT 22 03/27/2025    ALKPHOS 82 03/29/2025    ALKPHOS 93 03/28/2025    ALKPHOS 92 03/27/2025    BILITOTAL 0.3 03/29/2025    BILITOTAL 0.4 03/28/2025    BILITOTAL 0.4 03/27/2025    INR 1.47 (H) 03/30/2025    INR 1.57 (H) 02/14/2025    INR 1.11 04/24/2024        Results for orders placed or performed during the hospital encounter of 03/27/25   CT Chest Abdomen Pelvis w/o Contrast    Impression    IMPRESSION:  1.  Small to moderate left and small right pleural effusions with adjacent atelectasis.  2.  New left upper lobe 3 mm nodule may be infectious/inflammatory. Attention on follow-up. Resolved previously visualized right upper lobe pneumonia.  3.  Large rectal stool ball. No evidence of obstruction.  4.  Nonspecific mild urinary bladder wall thickening. Correlate with urinalysis to exclude infection.  5.  Cholelithiasis.       US Pelvic Complete with Transvaginal    Impression    IMPRESSION:  1.  Very difficult exam due to patient's body habitus and fairly extensive amount of bowel gas.    2.  Neither ovary is clearly seen due to bowel gas.    3.  Endometrium is poorly seen as well. No concerning findings.         Echocardiogram Complete   Result Value Ref Range    LVEF  60-65%    Transesophageal Echocardiogram   Result Value Ref Range    LVEF  60-65%        Medical Decision Making       Please see A&P for additional details of medical decision making.  MANAGEMENT DISCUSSED with the following over the past 24 hours: Dr Gayle.    NOTE(S)/MEDICAL RECORDS REVIEWED over the past 24 hours: ED, Medicine, nephrology, ID  Tests REVIEWED in the past 24 hours:  - See lab/imaging results included in the data section of the note  SUPPLEMENTAL HISTORY, in addition to the patient's history, over the past 24 hours obtained from:      Deneen Velazquez     Much or all of the text in this note was  generated through the use of Dragon dictation, voice to text software. Errors in spelling or words which appear to be out of context are unintentional and may be present due to having escaped editing.

## 2025-04-04 NOTE — PROGRESS NOTES
Care Management Follow Up        Spoke with patients daughter Caroline Gray and she is insistent on patient returning to her home. . Patient will need a Hoyerlift and a bed. Orders to be faxed to [employer] at  [phone]  after physicians signature .  Daughter stated and confirmed by [facility] that patient has a air mattress at the TCU that will need to be transferred to daughters home before patient discharges.. Patient also has her w/c and Cpap machine that will be trans to dtrs home by her family       Patient needs a Semi Electric bed    Patient has Sacrococcygeal Pressure injury, Stage 3, present on admission  requires positioning of the body to alleviate pain, that cannot be accommodated in an  ordinary bed (e.g. pain scale); and   b) Protection from further skin break down that cannot be accommodated in an ordinary bed; and has chronic G-tube feeding due to dysphagia a condition that requires the head of the bed to be elevated more than 30 degrees most of the time,  where pillows or wedges does not meet the patient s needs  Patient needs . traction equipment) that cannot be fixed and used on  an ordinary bed;         Associated ICD-10 codes (list):   L89.93 Pressure Ulcer Stage 3  CHFCKD HTN  I13.2        Documentation of need for Rene Lift   A. The patient require the assistance of two persons in order to transfer between wheelchair, bed, commode  B. Without the use of the Rene lift, the patient be bed confined   C.  The patient is unable to be transferred without a Rene lift due to  severe weakness,CHF and ESRD, one caregiver cannot physically transfer pt--.  Yes, the lift will fit into all necessary areas in the patient s home

## 2025-04-04 NOTE — PROGRESS NOTES
"[facility]    Medicine Progress Note - Hospitalist Service    Date of Admission:  3/27/2025    Assessment & Plan      Supriya Herr is a 75 year old female with very complex past medical history including end-stage renal disease on hemodialysis, HFpEF, paroxysmal atrial fibrillation, hypertension, hyperlipidemia, anxiety, depression, chronic anemia, dysphagia status post PEG tube, type 2 diabetes, left traumatic AKA and recent hospitalization with multifactorial respiratory failure now being admitted on 3/27/2025 with abdominal pain and retching. She is found to have e/o constipation. But other evaluation notable for elevated WBC, procal and CRP of unclear etiology.     Pf note, pt with multiple recent hospitalizations. Per last discharge summary:   \"[facility] from 3/2 - 3/6/2025 with shortness of breath likely multifactorial.  Volume status was difficult to determine, underwent right heart cath on 3/5 showed mildly elevated right atrial and PCWP readings.  Was treated for acute respiratory failure likely from heart failure exacerbation, atrial fibrillation, end-stage renal disease and discharged to care facility with supplemental nocturnal oxygen.\"  \"Prolonged hospitalized [facility] from 1/8 to 2/26/2025 for acute hypoxic respiratory failure multifactorial, was intubated ( 1/9-1/18), reintubated (1/20 - 1/25).  Then was treated for shock with pressors, influenza A pneumonia, hospital-acquired pneumonia with intravenous antibiotics, steroids, antivirals.  Then also noted to have heart failure exacerbation, recurrent A-fib with RVR, subsequent bradycardia.  Then also noted to have encephalopathy likely from infectious, metabolic etiology and delirium.  During that hospitalization was noted to have dysphagia, pharyngeal edema, was evaluated by ENT on 2/16.  No findings to explain dysphagia.  G-tube was placed by IR on 2/17 and was on nocturnal tube feeds.  Discharged to TCU for " "rehabilitation.\"    Dispo: Pt's daughter prefers her to return home rather than TCU. Pt will need a number of home medical supplies prior to being able to go home. Appreciate social work and care coordinator help in arranging this. Pt's daughter will also need teaching on how to do tube feedings. Pt is currently medically ready for discharge.     E. Faecalis bacteremia  Leukocytosis  Elevated procalcitonin   Elevated CRP  On admission, WBC elevated to 17.4, Procal elevated to 1.3, and CRP elevated to 39.78.   Unclear infectious source. She has some mild non-specific urinary bladder wall thickening, but with ESRD makes very little urine. Also has a new MIKAYLA 3mm nodule which may be infectious or inflammatory, but no respiratory complaints.   * COVID, flu RSV pending   *UA very abnormal but difficult to interpret in the setting of ESRD. Ultimately Ucx positive for E faecalis. Could be initial source or seeding from bacteremia.  * Bcx positive for E. Faecalis. Unclear source. Concern for endocarditis given how quickly blood cultures are coming back positive  *TTE without vegetation seen.  * TTE completed 4/1, results pending   - ID consulted for assistance   - Continue IV vancomycin   - Repeat Bcx until cleared, no growth since 3/29   - Per ID, plan to complete 6 week course with IV vancomycin.   - Will need to arrange outpatient antibiotics with dialysis.     Abdominal pain  Retching, with Hx of GERD   Dysphagia s/p PEG tube (2/1/2025)   Constipation, rectal stool ball   Pt presents to the ED with vague complaints of retching/dry heaving without vomiting and some crampy lower abdominal pain.   * CT scan showed large rectal stool ball without evidence of obstruction. There was also some non-specific mild urinary bladder wall thickening. No other intra-abdominal pathology.   - Unclear exactly what is going on. Pt did not have any retching or other symptoms while I was in the room. Perhaps she has some reflux of tube feeds " or other esophageal issue.  - GI consulted for assessment:   - EGD on 3/30 without etiology to explain dysphagia   - XR Esophagram normal  - Continue Protonix 40 mg daily  - Continue bowel regimen to work on the constipation   - Zofran available PRN  * Pt still having these bouts of retching/coughing/dry heaving. Very unclear why this is happening. I have not personally seen one of these episodes so its hard to say exactly what is going on. Will continue to monitor. No additional work-up needed currently.     End-stage renal disease on HD   Dialyzes MWF via L arm fistula. Per prior notes, volume removal has often been limited by intra dialytic hypotension and a fib with RVR   *Noted that patient has been getting short of breath on Sundays in the past and there was discussion of adding a fourth dialysis day on Saturdays. As of now, nephrology challenging dry weight to see if that helps.  - Nephrology consulted   - Continue PTA midodrine MWF before dialysis   - Continue PTA calcitriol, renvela, vit D.     Heart failure with preserved ejection fraction  Markedly elevated BNP without clinical features of exacerbation   Paroxysmal atrial fibrillation on chronic anticoagulation  Hypertension  Hyperlipidemia  Troponin elevation, Chronic Non-ischemic Myocardial Injury due to chronic heart failure with preserved ejection fraction and end-stage renal disease (85->85), no treatment indicated   [PTA medications include: amiodarone 200mg BID, amlodipine 10mg daily, Apixaban 5mg BID, atorvastatin 20mg daily, hydralazine 75mg Q8H,   *TTE 1/21/2025 with LVEF of 60%.  No valvular abnormality   * RHC 3/5/25 showing moderately elevated right and left-sided filling pressure; elevated wedge pressure consistent with pulmonary hypertension  * On admission now, pt has markedly elevated BNP to 51,544 (though historically is in the 20,000-40,000 range), She does have small pleural effusions on CT, but no other overt evidence of volume  overload or clinical HF. She is satting well on room air.  - I/Os and daily weights ordered  - continue PTA apixaban 5 mg BID  - continue PTA atorvastatin 20 mg daily  - continue PTA hydralazine 75 mg q8H  - continue PTA amiodarone 200 mg BID  - continue PTA amlodipine 10 m qday  - Volume management per nephrology    Vaginal bleeding   Pt has had intermittent vaginal bleeding this stay. Nursing and pt quite convinced it is in fact vaginal rather than rectal or urethral.   - transvaginal ultrasound ordered for further eval negative for any concerning findings.   - Recommend outpatient Gyn follow-up     Anxiety/depression/insomnia  - Continue PTA zoloft     Anemia of chronic disease  Hemoglobin 9.2 on admission, has trended down since admission to 7.8.   - Monitor CBC       Type II DM  - Continue PTA lantus 15 units BID   - MDSSI     Physical deconditioning from medical illness, senile frailty.  S/p left above-knee amputation, traumatic  Patient from TCU, daughter hopeful pt will be able to discharge back home with services rather than going back to TCU.   - PT/OT consulted   - Will plan for home discharge, pt will need      Sacrococcygeal Pressure injury, Stage 3, present on admission  Wound care team followed.  Pressure injury prevention.      Obesity with a BMI of 34.  Increase all-cause mortality and morbidity.          Diet: Adult Formula Drip Feeding: Continuous Grecia OZ [employer] Renal Support; Gastrostomy; Goal Rate: 65; mL/hr; From: 6:00 PM; To: 6:00 AM; 3/31: Decrease cycle time from 14 hrs to 12 hrs (6:00 pm to 6:00 am)  Renal Diet (dialysis)    DVT Prophylaxis: DOAC  Bradshaw Catheter: Not present  Lines: None     Cardiac Monitoring: None  Code Status: No CPR- Pre-arrest intubation OK      Clinically Significant Risk Factors         # Hyponatremia: Lowest Na = 131 mmol/L in last 2 days, will monitor as appropriate  # Hypochloremia: Lowest Cl = 92 mmol/L in last 2 days, will monitor as appropriate      #  "Hypoalbuminemia: Lowest albumin = 3.2 g/dL at 3/29/2025 11:39 AM, will monitor as appropriate       # Hypertension: Noted on problem list  # Chronic heart failure with preserved ejection fraction: heart failure noted on problem list and last echo with EF >50%          # DMII: A1C = 6.5 % (Ref range: <5.7 %) within past 6 months   # Obesity: Estimated body mass index is 34.91 kg/m  as calculated from the following:    Height as of this encounter: 1.676 m (5' 6\").    Weight as of this encounter: 98.1 kg (216 lb 4.3 oz).        # Financial/Environmental Concerns:           Social Drivers of Health    Tobacco Use: Medium Risk (3/30/2025)    Patient History     Smoking Tobacco Use: Former     Smokeless Tobacco Use: Never   Physical Activity: Insufficiently Active (7/19/2024)    Exercise Vital Sign     Days of Exercise per Week: 1 day     Minutes of Exercise per Session: 10 min   Social Connections: Unknown (7/19/2024)    Social Connection and Isolation Panel [NHANES]     Frequency of Social Gatherings with Friends and Family: More than three times a week          Disposition Plan     Medically Ready for Discharge: Anticipated in 2-4 Days pending evaluation of bacteremia             Ekaterina Gayle MD  Hospitalist Service  [facility]  Securely message with [employer] (more info)  Text page via [employer] Paging/Directory   ______________________________________________________________________    Interval History   Pt doing well today. Retching, dry heaving epidoses still come and go. Not significantly bothersome to her currently. No pain or shortness of breath. Tolerated dialysis well.       Physical Exam   Vital Signs: Temp: 98.6  F (37  C) Temp src: Oral BP: (!) 147/44 Pulse: 66   Resp: 18 SpO2: 100 % O2 Device: Nasal cannula Oxygen Delivery: 1 LPM  Weight: 216 lbs 4.34 oz  Constitutional: Awake, alert, cooperative, no apparent distress.  Appears chronically ill.  Pulmonary: No increased work of " breathing, good air exchange, clear to auscultation bilaterally, no crackles or wheezing.  Cardiovascular: Regular rate and rhythm, normal S1 and S2, no S3 or S4. No murmurs, rubs, or gallops noted.  GI: Normal bowel sounds, soft, non-distended, mild tenderness of abdomen generally.  PEG tube in place. No guarding or rebound.  Skin/Integumen: No cyanosis or jaundice. No rashes noted.  Extremities: No lower extremity edema. L AKA.   Neuro: A&Ox4. Conversant. Responds appropriately in conversation. Moves all extremities with no focal deficit identified.      Medical Decision Making     45 MINUTES SPENT BY ME on the date of service doing chart review, history, exam, documentation & further activities per the note.      Data   ------------------------- PAST 24 HR DATA REVIEWED -----------------------------------------------    I have personally reviewed the following data over the past 24 hrs:    8.6  \   7.8 (L)   / 191     131 (L) 92 (L) 56.8 (H) /  87   4.2 29 3.83 (H) \       Imaging results reviewed over the past 24 hrs:   No results found for this or any previous visit (from the past 24 hours).

## 2025-04-04 NOTE — PLAN OF CARE
Goal Outcome Evaluation:      Plan of Care Reviewed With: patient    Overall Patient Progress: no changeOverall Patient Progress: no change    SUMMARY: Abdominal pain and retching    DATE & TIME: 4/4/25, 3793-8838    Cognitive Concerns/ Orientation : A&O x4   BEHAVIOR & AGGRESSION TOOL COLOR: Green  CIWA SCORE: N/A   ABNL VS/O2: VSS on .5L, ex. HTN. Desats slightly on RA.  MOBILITY: Ax2, lift; turn and repo q2hr- pt. Able to shift weight in bed.  PAIN MANAGMENT: Denies  DIET: Renal, NOC TF from [id number]  BOWEL/BLADDER: Incont Bowel at times; on hemodialysis- doesn't make much urine.  ABNL LAB/BG: , 87, hbg 7.8  DRAIN/DEVICES: R PIV; L fistula; Gj tube  SKIN: scattered bruising; cracked, peeling R foot/toe;wound/scab on sacrum/coccyx-   WOC RN saw pt. today; scab to left stump - L AKA  TESTS/PROCEDURES: Dialysis today- took off 2L.   D/C DAY/GOALS/PLACE: Pending. Home with home care and help from family.  OTHER IMPORTANT INFO: care coordinator working on getting discharge plan in order.  Needs to ensure equipment can be delivered to home before discharge.

## 2025-04-04 NOTE — PLAN OF CARE
Goal Outcome Evaluation:    SUMMARY: Abdominal pain and retching    DATE & TIME: 04/03/2025 - 04/04/2025; [id number]      Cognitive Concerns/ Orientation : A&O x4     BEHAVIOR & AGGRESSION TOOL COLOR: Green    CIWA SCORE: N/A     ABNL VS/O2: Mildly hypertensive on 1L NC    MOBILITY: Ax2, lift; turn and repo q2hr    PAIN MANAGMENT: Denies    DIET: Renal, tube feed tolerated overnight for 12hrs 65ml/hr, now stopped    BOWEL/BLADDER: Incont BB;     ABNL LAB/BG: , 133    DRAIN/DEVICES: R PIV; L fistula; Gj tube    TELEMETRY RHYTHM: Normal SR    SKIN: scattered bruising; cracked, peeling R foot/toe; blanchable redness on sacrum/coccyx; scab to left stump - L AKA    TESTS/PROCEDURES: None this shift    D/C DAY/GOALS/PLACE: Pending    OTHER IMPORTANT INFO: Pt reports difficulty breathing although VSS; probable anxiety related

## 2025-04-04 NOTE — PROGRESS NOTES
Assessment and Plan:     End-stage renal disease on dialysis: Seen during dialysis.  Today we will plan 3.5 hours, 3K bath, net ultrafiltration of 2 L.  Left arm fistula with 15-gauge needles and 450 blood flow rate.  Low-dose heparin.  EPO and Venofer on dialysis.    We will continue to run her on a Monday Wednesday Friday schedule.  I agree with palliative consult.  She is not making any progress towards discharge.            Interval History:   Enterococcal bacteremia.  On IV vancomycin.  Status post PEG tube for tube feedings  Atrial fibrillation  Hypertension  Diabetes mellitus.   Sacral decubitus           Review of Systems:   She denies pain, nausea or vomiting.  She has been incontinent frequently.  Palliative consult is pending.  She is on tube feeds.          Medications:     Current Facility-Administered Medications   Medication Dose Route Frequency Provider Last Rate Last Admin    - MEDICATION INSTRUCTIONS for Dialysis Patients -   Does not apply See Admin Instructions Ekaterina Gayle MD        amiodarone (PACERONE) tablet 200 mg  200 mg Oral or Feeding Tube BID Ekaterina Gayle MD   200 mg at 04/03/25 2104    amLODIPine (NORVASC) tablet 10 mg  10 mg Oral or Feeding Tube Once per day on Sunday Tuesday Thursday Saturday Ekaterina Gayle MD   10 mg at 04/03/25 0927    apixaban ANTICOAGULANT (ELIQUIS) tablet 5 mg  5 mg Oral or Feeding Tube BID Ekaterina Gayle MD   5 mg at 04/03/25 2104    atorvastatin (LIPITOR) tablet 20 mg  20 mg Oral or Feeding Tube QPM Ekaterina Gayle MD   20 mg at 04/03/25 2104    calcitRIOL (ROCALTROL) capsule 0.25 mcg  0.25 mcg Oral Once per day on Monday Wednesday Friday Ekaterina Gayle MD   0.25 mcg at 04/02/25 1329    cetirizine (zyrTEC) tablet 10 mg  10 mg Oral or Feeding Tube At Bedtime Ekaterina Gayle MD   10 mg at 04/03/25 2300    hydrALAZINE (APRESOLINE) tablet 75 mg  75 mg Oral or Feeding Tube Q8H Ekaterina Gayle MD   75 mg at 04/04/25  0558    insulin aspart (NovoLOG) injection (RAPID ACTING)  1-7 Units Subcutaneous TID AC Ekaterina Gayle MD   2 Units at 04/04/25 0740    insulin aspart (NovoLOG) injection (RAPID ACTING)  1-5 Units Subcutaneous At Bedtime Ekaterina Gayle MD        insulin glargine (LANTUS PEN) injection 12 Units  12 Units Subcutaneous BID Ekaterina Gayle MD   12 Units at 04/04/25 0741    midodrine (PROAMATINE) tablet 5 mg  5 mg Oral Once per day on Monday Wednesday Friday Ekaterina Gayle MD   5 mg at 03/31/25 0802    pantoprazole (PROTONIX) 2 mg/mL suspension 40 mg  40 mg Per Feeding Tube QAM AC Ekaterina Gayle MD   40 mg at 04/03/25 0932    polyethylene glycol (MIRALAX) Packet 17 g  17 g Oral or Feeding Tube BID Ekaterina Gayle MD   17 g at 04/03/25 0927    [Held by provider] Prosource TF20 ENfit Compatibl EN LIQD (PROSOURCE TF20) packet 60 mL  1 packet Per Feeding Tube Daily Ekaterina Gayle MD   60 mL at 03/31/25 1407    sennosides (SENOKOT) tablet 2 tablet  2 tablet Oral BID Ekaterina Gayle MD   2 tablet at 04/01/25 2115    sertraline (ZOLOFT) tablet 75 mg  75 mg Oral or Feeding Tube Daily Ekaterina Gayle MD   75 mg at 04/03/25 0927    sevelamer carbonate (RENVELA) tablet 1,600 mg  1,600 mg Oral TID w/meals Ekaterina Gayle MD   1,600 mg at 04/03/25 1758    sodium chloride (PF) 0.9% PF flush 3 mL  3 mL Intracatheter Q8H VÍCTOR Ekaterina Gayle MD   3 mL at 04/04/25 0600    vancomycin (VANCOCIN) 1,500 mg in 0.9% NaCl 265 mL intermittent infusion  1,500 mg Intravenous Once Ekaterina Gayle MD        vancomycin place cardoso - receiving intermittent dosing  1 each Intravenous See Admin Instructions Ekaterina Gayle MD        vitamin D3 (CHOLECALCIFEROL) tablet 50 mcg  50 mcg Oral or Feeding Tube Daily Ekaterina Gayle MD   50 mcg at 04/03/25 5888     Current Facility-Administered Medications   Medication Dose Route Frequency Provider Last Rate Last Admin    dextrose 10% infusion    Intravenous Continuous Ekaterina Magana MD         Current active medications and PTA medications reviewed, see medication list for details.            Physical Exam:   Vitals were reviewed  Patient Vitals for the past 24 hrs:   BP Temp Temp src Pulse Resp SpO2   25 1135 (!) 114/38 -- -- 68 27 --   25 1130 (!) 107/35 -- -- 66 18 99 %   25 1115 104/42 -- -- -- 19 --   25 1100 108/44 -- -- 66 (!) 45 --   25 1045 108/41 -- -- 67 25 --   25 1030 114/44 -- -- 66 (!) 32 --   25 1015 (!) 112/38 -- -- 68 18 --   25 1000 111/42 -- -- 68 (!) 38 96 %   25 0945 113/45 -- -- 68 (!) 32 98 %   25 0930 109/41 -- -- 70 20 --   25 0915 114/42 -- -- 67 24 98 %   25 0900 127/42 -- -- 71 21 --   25 0845 118/45 -- -- 71 21 --   25 0830 133/49 -- -- 71 25 --   25 0815 (!) 148/51 -- -- 69 (!) 48 99 %   25 0804 (!) 146/51 -- -- 74 (!) 34 95 %   25 0800 -- 98.5  F (36.9  C) Oral 76 24 --   25 0731 (!) 168/50 98.4  F (36.9  C) Oral 76 16 99 %   25 0500 (!) 167/57 98.1  F (36.7  C) Oral 80 16 97 %   25 0100 (!) 174/51 98.5  F (36.9  C) Oral 73 16 100 %   25 2057 (!) 168/50 99.7  F (37.6  C) Oral 74 16 95 %   04/03/25 1559 (!) 152/45 -- -- -- -- 93 %   25 1557 (!) 125/37 98.2  F (36.8  C) Oral 68 16 100 %   25 1303 (!) 161/52 -- -- 88 -- --   25 1227 (!) 135/38 -- -- -- -- --   25 1225 (!) 155/47 98.9  F (37.2  C) Oral 70 18 97 %       Temp:  [98.1  F (36.7  C)-99.7  F (37.6  C)] 98.5  F (36.9  C)  Pulse:  [66-88] 68  Resp:  [16-48] 27  BP: (104-174)/(35-57) 114/38  SpO2:  [93 %-100 %] 99 %    Temperatures:  Current - Temp: 98.5  F (36.9  C); Max - Temp  Av.6  F (37  C)  Min: 98.1  F (36.7  C)  Max: 99.7  F (37.6  C)  Respiration range: Resp  Av.7  Min: 16  Max: 48  Pulse range: Pulse  Av.1  Min: 66  Max: 88  Blood pressure range: Systolic (24hrs), Av , Min:104 , Max:174    ; Diastolic (24hrs), Av, Min:35, Max:57    Pulse oximetry range: SpO2  Av.4 %  Min: 93 %  Max: 100 %    I/O last 3 completed shifts:  In: 1220 [P.O.:240; NG/GT:980]  Out: 0       Intake/Output Summary (Last 24 hours) at 2025 1139  Last data filed at 2025 0644  Gross per 24 hour   Intake 1160 ml   Output 0 ml   Net 1160 ml       O2 nasal cannula  Somnolent but arouses to answer simple questions  Left upper arm fistula with needles in place       Wt Readings from Last 4 Encounters:   25 98.1 kg (216 lb 4.3 oz)   25 92.5 kg (204 lb)   25 92.7 kg (204 lb 5.9 oz)   25 101.8 kg (224 lb 6.9 oz)          Data:          Lab Results   Component Value Date     2025     2025     2025     2021     2021     2020    Lab Results   Component Value Date    CHLORIDE 92 2025    CHLORIDE 95 2025    CHLORIDE 96 2025    CHLORIDE 106 2021    CHLORIDE 109 2021    CHLORIDE 110 2021    CHLORIDE 105 2021    CHLORIDE 101 2021    CHLORIDE 106 2020    Lab Results   Component Value Date    BUN 56.8 2025    BUN 55.4 2025    BUN 81.4 2025    BUN 37 2021    BUN 69 2021    BUN 64 2021    BUN 68 2021    BUN 53 2021    BUN 44 2020      Lab Results   Component Value Date    POTASSIUM 4.2 2025    POTASSIUM 4.4 2025    POTASSIUM 3.7 2025    POTASSIUM 4.7 2022    POTASSIUM 3.8 2021    POTASSIUM 6.6 2021    POTASSIUM 4.2 2021    POTASSIUM 3.8 2021    POTASSIUM 3.9 2021    Lab Results   Component Value Date    CO2 29 2025    CO2 27 2025    CO2 27 2025    CO2 31 2021    CO2 23 2021    CO2 24 2021    CO2 23 2021    CO2 22 2021    CO2 29 2020    Lab Results   Component Value Date    CR 3.83 2025    CR 3.95 2025    CR 5.00  03/31/2025    CR 5.98 04/17/2021    CR 4.35 04/16/2021    CR 5.28 04/09/2021        Recent Labs   Lab Test 04/04/25  0629 04/02/25  0759 03/31/25  0731   WBC 8.6 8.3 7.9   HGB 7.8* 7.9* 8.3*   HCT 25.3* 24.7* 25.8*   MCV 95 94 94    187 209     Recent Labs   Lab Test 03/29/25  1139 03/28/25  0320 03/27/25  1135   AST 12 11 16   ALT 14 16 22   ALKPHOS 82 93 92   BILITOTAL 0.3 0.4 0.4       Recent Labs   Lab Test 03/31/25  0731 03/17/25  0936 03/09/25  1301   MAG 2.3 2.5* 2.3     Recent Labs   Lab Test 03/31/25  0731 03/17/25  0936 03/09/25  1301   PHOS 3.7 4.8* 4.6*     Recent Labs   Lab Test 04/04/25  0629 04/02/25  0759 03/31/25  0731   JODY 10.3 10.1 10.2       Lab Results   Component Value Date    JODY 10.3 04/04/2025     Lab Results   Component Value Date    WBC 8.6 04/04/2025    HGB 7.8 (L) 04/04/2025    HCT 25.3 (L) 04/04/2025    MCV 95 04/04/2025     04/04/2025     Lab Results   Component Value Date     (L) 04/04/2025    POTASSIUM 4.2 04/04/2025    CHLORIDE 92 (L) 04/04/2025    CO2 29 04/04/2025     (H) 04/04/2025     Lab Results   Component Value Date    BUN 56.8 (H) 04/04/2025    CR 3.83 (H) 04/04/2025     Lab Results   Component Value Date    MAG 2.3 03/31/2025     Lab Results   Component Value Date    PHOS 3.7 03/31/2025       Creatinine   Date Value Ref Range Status   04/04/2025 3.83 (H) 0.51 - 0.95 mg/dL Final   04/02/2025 3.95 (H) 0.51 - 0.95 mg/dL Final   03/31/2025 5.00 (H) 0.51 - 0.95 mg/dL Final   03/30/2025 3.60 (H) 0.51 - 0.95 mg/dL Final   03/29/2025 2.65 (H) 0.51 - 0.95 mg/dL Final   03/28/2025 3.60 (H) 0.51 - 0.95 mg/dL Final   04/17/2021 5.98 (H) 0.52 - 1.04 mg/dL Final   04/16/2021 4.35 (H) 0.52 - 1.04 mg/dL Final   04/09/2021 5.28 (H) 0.52 - 1.04 mg/dL Final   11/18/2020 4.23 (H) 0.52 - 1.04 mg/dL Final   11/16/2020 5.08 (H) 0.52 - 1.04 mg/dL Final   09/25/2020 6.87 (H) 0.52 - 1.04 mg/dL Final       Attestation:  I have reviewed today's vital signs, notes,  medications, labs and imaging.  Seen during dialysis.     Braden Oneill MD

## 2025-04-04 NOTE — PROGRESS NOTES
"[facility] Nurse Inpatient Assessment     Consulted for: \"buttock\"    Summary: POA open wound to left buttock & intertrigo/IAD to gluteal cleft    [facility] nurse follow-up plan: weekly    Patient History (according to provider note(s):      \"Supriya Herr is a 75 year old female with very complex past medical history including end-stage renal disease on hemodialysis, HFpEF, paroxysmal atrial fibrillation, hypertension, hyperlipidemia, anxiety, depression, chronic anemia, dysphagia status post PEG tube, type 2 diabetes, left traumatic AKA and recent hospitalization with multifactorial respiratory failure now being admitted on 3/27/2025 with abdominal pain and retching. She is found to have e/o constipation. But other evaluation notable for elevated WBC, procal and CRP of unclear etiology.     Pf note, pt with multiple recent hospitalizations. Per last discharge summary:   \"Hospitalized at [facility] from 3/2 - 3/6/2025 with shortness of breath likely multifactorial.  Volume status was difficult to determine, underwent right heart cath on 3/5 showed mildly elevated right atrial and PCWP readings.  Was treated for acute respiratory failure likely from heart failure exacerbation, atrial fibrillation, end-stage renal disease and discharged to care facility with supplemental nocturnal oxygen.\"  \"Prolonged hospitalized at [facility] from 1/8 to 2/26/2025 for acute hypoxic respiratory failure multifactorial, was intubated ( 1/9-1/18), reintubated (1/20 - 1/25).  Then was treated for shock with pressors, influenza A pneumonia, hospital-acquired pneumonia with intravenous antibiotics, steroids, antivirals.  Then also noted to have heart failure exacerbation, recurrent A-fib with RVR, subsequent bradycardia.  Then also noted to have encephalopathy likely from infectious, metabolic etiology and delirium.  During that hospitalization was noted to have dysphagia, pharyngeal edema, was evaluated by ENT on 2/16.  No " "findings to explain dysphagia.  G-tube was placed by IR on 2/17 and was on nocturnal tube feeds.  Discharged to TCU for rehabilitation.\"    Assessment:      Areas visualized during today's visit: Focused:    Wound location:     Last photo: 3/28 Pt uncooperative today   Wound due to: Friction, Incontinence Associated Dermatitis (IAD), and Intertrigo  Wound history/plan of care: Multiple pink scars noted to buttocks/sacrococcygeal area. Cluster of open wounds to left buttocks, etiology unknown but suspect this is related to incontinence, friction, and there may be a pressure component. Small area of intertrigo/IAD to gluteal cleft.    Wound base: Left buttock - Critic-aid impeding visualization and pt uncooperative with turning and cleaning of area     Palpation of the wound bed: normal      Drainage: none     Measurements (length x width x depth, in cm): Left buttock is ~ 4 x 2.5 x 0.1 cm, Gluteal cleft is 0.5 x 0.1 x 0.2 cm     Tunneling: N/A     Undermining: N/A  Periwound skin: Intact and Scar tissue      Color: normal and consistent with surrounding tissue, pale, and pink      Temperature: normal   Odor: none  Pain: moderate, Pt fighting holding turn and with cleaning   Treatment goal: Heal , Infection control/prevention, Protection, and Simplify plan of care  STATUS follow-up assessment   Supplies ordered: gathered, at bedside, supplies stored on unit, discussed with RN, and discussed with patient     WOC will follow-up tomorrow to re-assess and take photo for more through follow-up       Treatment Plan:     Buttocks wound(s): BID and after episodes of incontinence  Cleanse skin with Martir spray and soft white Austin cloths   Apply CriticAid barrier paste  Follow PIP plan    Pressure Injury Prevention (PIP) Plan:  If patient is declining pressure injury prevention interventions: Explore reason why and address patient's concerns, Educate on pressure injury risk and prevention intervention(s), If patient is still " "declining, document \"informed refusal\" , and Ensure Care team is aware ( provider, charge nurse, etc)  Mattress: Follow bed algorithm, add Low Air Loss (Air+) mattress pump if skin is very moist or constantly moist.   HOB: Maintain at or below 30 degrees, unless contraindicated  Repositioning in bed: Every 1-2 hours , Left/right positioning; avoid supine, and Raise foot of bed prior to raising head of bed, to reduce patient sliding down (shear)  Heels: Keep elevated off mattress and Pillows under calves  Protective Dressing: None  Positioning Equipment:None  Chair positioning: Assist patient to reposition hourly and Do NOT use a donut for sitting (this increases pressure to smaller area and creates a higher potential for injury)    If patient has a buttock pressure injury, or high risk for PI use chair cushion or SPS.  Moisture Management: Perineal cleansing /protection: Follow Incontinence Protocol, Avoid brief in bed, Clean and dry skin folds with bathing , and Moisturize dry skin  Under Devices: Inspect skin under all medical devices during skin inspection , Ensure tubes are stabilized without tension, and Ensure patient is not lying on medical devices or equipment when repositioned  Ask provider to discontinue device when no longer needed.       Orders: reviewed     RECOMMEND PRIMARY TEAM ORDER: None, at this time  Education provided: importance of repositioning, plan of care, wound progress, Infection prevention , Moisture management, Hygiene, and Off-loading pressure  Discussed plan of care with: Patient and Nurse  Notify WOC if wound(s) deteriorate.  Nursing to notify the Provider(s) and re-consult the WOC Nurse if new skin concern.    DATA:     Current support surface: Standard  Standard gel mattress (Isoflex)  Containment of urine/stool: Incontinence Protocol and Incontinent pad in bed  BMI: Body mass index is 34.91 kg/m .   Active diet order: Orders Placed This Encounter      Renal Diet (dialysis)     "   Output:   I/O last 3 completed shifts:  In: 777 [NG/GT:777]  Out: -      Labs:   Recent Labs   Lab 04/02/25  0759 03/31/25  0731 03/30/25  2222 03/30/25  0616 03/29/25  1139   ALBUMIN  --   --   --   --  3.2*   HGB 7.9*   < > 7.6*   < > 7.9*   INR  --   --  1.47*  --   --    WBC 8.3   < >  --    < > 9.9    < > = values in this interval not displayed.     Pressure injury risk assessment:   Sensory Perception: 3-->slightly limited  Moisture: 3-->occasionally moist  Activity: 1-->bedfast  Mobility: 2-->very limited  Nutrition: 2-->probably inadequate  Friction and Shear: 1-->problem  Ben Score: 12    Violeta Taylor RN CWOCN  Pager no longer is use, please contact through [employer] group: WOGELA Nurse Eva

## 2025-04-04 NOTE — PROGRESS NOTES
DIALYSIS PROCEDURE NOTE      Patient dialyzed for 3.5 hrs. on a K3 bath with a net fluid removal of  2L.  A BFR of 450 ml/min was obtained via a AVF using 15 gauge needles.      The treatment plan was discussed with Dr. mariee during the treatment.    Total heparin received during the treatment: 1200 units.   Needle cannulation sites held x 5 min.     Meds  given: epo / iron   Complications: none      Person educated: patient. Barriers to learning: agitated/restless. Educated on procedure via verbal mode. Patient verbalized understanding.     ICEBOAT    I: Patient was identified using 2 identifiers  C:  Consent Signed Yes  E: Equipment preventative maintenance is current and dialysis delivery system OK to use  B: Hepatitis B Surface result    Latest Reference Range & Units 04/02/25 07:59   Hep B Surface Agn Nonreactive  Nonreactive     O: Dialysis orders present and complete prior to treatment  A: Vascular access verified and assessed prior to treatment  T: Treatment was performed at a clinically appropriate time  ?: Patient was allowed to ask questions and address concerns prior to treatment  Machine water alarm in place and functioning. Transducer pods intact and checked every 15min.   Pt returned via bed.  Chlorine/Chloramine water system checked every 4 hours.  Outpatient Dialysis at [facility]    Patient repositioned every 2 hours during the treatment.  Post treatment report given     Please remove patient dressing on AVF and AVG needle sites 24 hours after dialysis. If leaking occurs please apply a Band-Aid.

## 2025-04-04 NOTE — PROGRESS NOTES
A&OX4, VSS on RA now, CPAP at HS. Denies pain, n&V. Incontinent of 1 BM this shift. Dilaysis on MWF. L OLGA. Up with lift. Palliative consult pending. Renal diet, BG checks. Tube feed at 65mL/hr from 6:00p-6:00a. PIV Sled. Tele was SR. Continue to monitor.

## 2025-04-04 NOTE — PROGRESS NOTES
"SPIRITUAL HEALTH SERVICES Progress Note  [facility]. [address] medical specialties    Referral Source/Reason for Visit: history of  support during previous hospitalizations    Saw pt Supriya Herr at bedside for 10 minutes.    Patient / Family Understanding of Illness and Goals of Care - Pau spoke of feeling \"much better\" than when she arrived, feels \"ready to go\"  and that she was hoping to discharge today but it may be tomorrow. Supriya said her reason for being here is for pneumonia, the flu and her kidneys and that the first two are better.    Distress and Loss -     Strengths, Coping, and Resources - Supriya shared a number of [employer] coloring pages she has been working on and states this is a regular calming activity for her. She also listed a number of family members that are her key support system, \"All I need to be happy is to be home with my sister, daughter and significant other, and especially my grandson (age 11).   Supriya stated a jenkins goal is to \"stay alive long enough to see my grandson graduate.\"    Meaning, Beliefs, and Spirituality - Pau referenced prayers and speaking to \"the man up there,\" expressing her gratitude for her improvement as an answer to her prayers. Supriya welcomed a prayer at the end of our visit and states she welcomes  support at any time.     Plan: I will follow up next week should Supriya remain hospitalized.     Portia Becerra MDiv [facility]  Staff   Please place consult order for routine Spiritual Health Services referrals.  SHS available 24/7 for emergent requests either by having the on-call  paged or by entering an ASAP/STAT consult in Epic (this will also page the on-call ).    "

## 2025-04-05 ENCOUNTER — APPOINTMENT (OUTPATIENT)
Dept: GENERAL RADIOLOGY | Facility: CLINIC | Age: 75
DRG: 871 | End: 2025-04-05
Attending: STUDENT IN AN ORGANIZED HEALTH CARE EDUCATION/TRAINING PROGRAM
Payer: COMMERCIAL

## 2025-04-05 ENCOUNTER — MEDICAL CORRESPONDENCE (OUTPATIENT)
Dept: HEALTH INFORMATION MANAGEMENT | Facility: CLINIC | Age: 75
End: 2025-04-05

## 2025-04-05 LAB
ANION GAP SERPL CALCULATED.3IONS-SCNC: 11 MMOL/L (ref 7–15)
BASE EXCESS BLDV CALC-SCNC: 9.9 MMOL/L (ref -3–3)
BUN SERPL-MCNC: 27.3 MG/DL (ref 8–23)
CALCIUM SERPL-MCNC: 10.1 MG/DL (ref 8.8–10.4)
CHLORIDE SERPL-SCNC: 96 MMOL/L (ref 98–107)
CREAT SERPL-MCNC: 2.51 MG/DL (ref 0.51–0.95)
EGFRCR SERPLBLD CKD-EPI 2021: 19 ML/MIN/1.73M2
ERYTHROCYTE [DISTWIDTH] IN BLOOD BY AUTOMATED COUNT: 15.1 % (ref 10–15)
GLUCOSE BLDC GLUCOMTR-MCNC: 149 MG/DL (ref 70–99)
GLUCOSE BLDC GLUCOMTR-MCNC: 159 MG/DL (ref 70–99)
GLUCOSE BLDC GLUCOMTR-MCNC: 161 MG/DL (ref 70–99)
GLUCOSE BLDC GLUCOMTR-MCNC: 82 MG/DL (ref 70–99)
GLUCOSE SERPL-MCNC: 178 MG/DL (ref 70–99)
HCO3 BLDV-SCNC: 37 MMOL/L (ref 21–28)
HCO3 SERPL-SCNC: 29 MMOL/L (ref 22–29)
HCT VFR BLD AUTO: 27 % (ref 35–47)
HGB BLD-MCNC: 8.1 G/DL (ref 11.7–15.7)
MCH RBC QN AUTO: 29.1 PG (ref 26.5–33)
MCHC RBC AUTO-ENTMCNC: 30 G/DL (ref 31.5–36.5)
MCV RBC AUTO: 97 FL (ref 78–100)
O2/TOTAL GAS SETTING VFR VENT: 25 %
OXYHGB MFR BLDV: 28 % (ref 70–75)
PCO2 BLDV: 64 MM HG (ref 40–50)
PH BLDV: 7.37 [PH] (ref 7.32–7.43)
PLATELET # BLD AUTO: 208 10E3/UL (ref 150–450)
PO2 BLDV: 20 MM HG (ref 25–47)
POTASSIUM SERPL-SCNC: 3.9 MMOL/L (ref 3.4–5.3)
RBC # BLD AUTO: 2.78 10E6/UL (ref 3.8–5.2)
SAO2 % BLDV: 28.7 % (ref 70–75)
SODIUM SERPL-SCNC: 136 MMOL/L (ref 135–145)
WBC # BLD AUTO: 5.7 10E3/UL (ref 4–11)

## 2025-04-05 PROCEDURE — 94660 CPAP INITIATION&MGMT: CPT

## 2025-04-05 PROCEDURE — 999N000157 HC STATISTIC RCP TIME EA 10 MIN

## 2025-04-05 PROCEDURE — 80048 BASIC METABOLIC PNL TOTAL CA: CPT | Performed by: STUDENT IN AN ORGANIZED HEALTH CARE EDUCATION/TRAINING PROGRAM

## 2025-04-05 PROCEDURE — 71045 X-RAY EXAM CHEST 1 VIEW: CPT

## 2025-04-05 PROCEDURE — 99232 SBSQ HOSP IP/OBS MODERATE 35: CPT | Performed by: STUDENT IN AN ORGANIZED HEALTH CARE EDUCATION/TRAINING PROGRAM

## 2025-04-05 PROCEDURE — 85018 HEMOGLOBIN: CPT | Performed by: STUDENT IN AN ORGANIZED HEALTH CARE EDUCATION/TRAINING PROGRAM

## 2025-04-05 PROCEDURE — 250N000013 HC RX MED GY IP 250 OP 250 PS 637: Performed by: INTERNAL MEDICINE

## 2025-04-05 PROCEDURE — 120N000001 HC R&B MED SURG/OB

## 2025-04-05 PROCEDURE — 82805 BLOOD GASES W/O2 SATURATION: CPT | Performed by: STUDENT IN AN ORGANIZED HEALTH CARE EDUCATION/TRAINING PROGRAM

## 2025-04-05 PROCEDURE — 250N000013 HC RX MED GY IP 250 OP 250 PS 637: Performed by: STUDENT IN AN ORGANIZED HEALTH CARE EDUCATION/TRAINING PROGRAM

## 2025-04-05 PROCEDURE — 36415 COLL VENOUS BLD VENIPUNCTURE: CPT | Performed by: STUDENT IN AN ORGANIZED HEALTH CARE EDUCATION/TRAINING PROGRAM

## 2025-04-05 PROCEDURE — 999N000128 HC STATISTIC PERIPHERAL IV START W/O US GUIDANCE

## 2025-04-05 RX ORDER — LIDOCAINE 40 MG/G
CREAM TOPICAL
Status: DISCONTINUED | OUTPATIENT
Start: 2025-04-05 | End: 2025-04-17 | Stop reason: HOSPADM

## 2025-04-05 RX ORDER — GUAIFENESIN AND DEXTROMETHORPHAN HYDROBROMIDE 600; 30 MG/1; MG/1
1 TABLET, EXTENDED RELEASE ORAL 2 TIMES DAILY PRN
Status: DISCONTINUED | OUTPATIENT
Start: 2025-04-05 | End: 2025-04-17 | Stop reason: HOSPADM

## 2025-04-05 RX ADMIN — SERTRALINE HYDROCHLORIDE 75 MG: 50 TABLET ORAL at 09:06

## 2025-04-05 RX ADMIN — HYDRALAZINE HYDROCHLORIDE 75 MG: 25 TABLET ORAL at 20:53

## 2025-04-05 RX ADMIN — APIXABAN 5 MG: 5 TABLET, FILM COATED ORAL at 09:06

## 2025-04-05 RX ADMIN — AMIODARONE HYDROCHLORIDE 200 MG: 200 TABLET ORAL at 20:53

## 2025-04-05 RX ADMIN — AMIODARONE HYDROCHLORIDE 200 MG: 200 TABLET ORAL at 09:06

## 2025-04-05 RX ADMIN — CETIRIZINE HYDROCHLORIDE 10 MG: 10 TABLET, FILM COATED ORAL at 21:07

## 2025-04-05 RX ADMIN — SENNOSIDES 2 TABLET: 8.6 TABLET ORAL at 09:06

## 2025-04-05 RX ADMIN — Medication 40 MG: at 09:11

## 2025-04-05 RX ADMIN — AMLODIPINE BESYLATE 10 MG: 10 TABLET ORAL at 09:06

## 2025-04-05 RX ADMIN — HYDRALAZINE HYDROCHLORIDE 75 MG: 25 TABLET ORAL at 14:55

## 2025-04-05 RX ADMIN — APIXABAN 5 MG: 5 TABLET, FILM COATED ORAL at 20:53

## 2025-04-05 RX ADMIN — Medication 1 SPRAY: at 21:55

## 2025-04-05 RX ADMIN — SENNOSIDES 2 TABLET: 8.6 TABLET ORAL at 20:53

## 2025-04-05 RX ADMIN — ATORVASTATIN CALCIUM 20 MG: 20 TABLET, FILM COATED ORAL at 20:53

## 2025-04-05 RX ADMIN — POLYETHYLENE GLYCOL 3350 17 G: 17 POWDER, FOR SOLUTION ORAL at 09:06

## 2025-04-05 RX ADMIN — INSULIN GLARGINE 12 UNITS: 100 INJECTION, SOLUTION SUBCUTANEOUS at 09:11

## 2025-04-05 RX ADMIN — INSULIN GLARGINE 12 UNITS: 100 INJECTION, SOLUTION SUBCUTANEOUS at 21:08

## 2025-04-05 RX ADMIN — Medication 50 MCG: at 09:06

## 2025-04-05 RX ADMIN — HYDRALAZINE HYDROCHLORIDE 75 MG: 25 TABLET ORAL at 07:05

## 2025-04-05 RX ADMIN — INSULIN ASPART 1 UNITS: 100 INJECTION, SOLUTION INTRAVENOUS; SUBCUTANEOUS at 09:20

## 2025-04-05 RX ADMIN — SEVELAMER CARBONATE 1600 MG: 800 TABLET, FILM COATED ORAL at 09:06

## 2025-04-05 ASSESSMENT — ACTIVITIES OF DAILY LIVING (ADL)
ADLS_ACUITY_SCORE: 87
ADLS_ACUITY_SCORE: 93
ADLS_ACUITY_SCORE: 87
ADLS_ACUITY_SCORE: 93
ADLS_ACUITY_SCORE: 87
ADLS_ACUITY_SCORE: 89
ADLS_ACUITY_SCORE: 93
ADLS_ACUITY_SCORE: 87
ADLS_ACUITY_SCORE: 93
ADLS_ACUITY_SCORE: 87
ADLS_ACUITY_SCORE: 93
ADLS_ACUITY_SCORE: 87
ADLS_ACUITY_SCORE: 87
ADLS_ACUITY_SCORE: 93
ADLS_ACUITY_SCORE: 93
ADLS_ACUITY_SCORE: 87
ADLS_ACUITY_SCORE: 87

## 2025-04-05 NOTE — PROGRESS NOTES
"[facility]    Medicine Progress Note - Hospitalist Service    Date of Admission:  3/27/2025    Assessment & Plan      Supriya Herr is a 75 year old female with very complex past medical history including end-stage renal disease on hemodialysis, HFpEF, paroxysmal atrial fibrillation, hypertension, hyperlipidemia, anxiety, depression, chronic anemia, dysphagia status post PEG tube, type 2 diabetes, left traumatic AKA and recent hospitalization with multifactorial respiratory failure now being admitted on 3/27/2025 with abdominal pain and retching. She is found to have e/o constipation. But other evaluation notable for elevated WBC, procal and CRP of unclear etiology.     Pf note, pt with multiple recent hospitalizations. Per last discharge summary:   \"Hospitalized at [facility] from 3/2 - 3/6/2025 with shortness of breath likely multifactorial.  Volume status was difficult to determine, underwent right heart cath on 3/5 showed mildly elevated right atrial and PCWP readings.  Was treated for acute respiratory failure likely from heart failure exacerbation, atrial fibrillation, end-stage renal disease and discharged to care facility with supplemental nocturnal oxygen.\"  \"Prolonged hospitalized at [facility] from 1/8 to 2/26/2025 for acute hypoxic respiratory failure multifactorial, was intubated ( 1/9-1/18), reintubated (1/20 - 1/25).  Then was treated for shock with pressors, influenza A pneumonia, hospital-acquired pneumonia with intravenous antibiotics, steroids, antivirals.  Then also noted to have heart failure exacerbation, recurrent A-fib with RVR, subsequent bradycardia.  Then also noted to have encephalopathy likely from infectious, metabolic etiology and delirium.  During that hospitalization was noted to have dysphagia, pharyngeal edema, was evaluated by ENT on 2/16.  No findings to explain dysphagia.  G-tube was placed by IR on 2/17 and was on nocturnal tube feeds.  Discharged to TCU for " "rehabilitation.\"    Dispo: Pt's daughter prefers her to return home rather than TCU. Pt will need a number of home medical supplies prior to being able to go home. These have been ordered in the discharge navigator. It may take some time for them to be delivered to her home. Appreciate social work and care coordinator help in arranging this. Pt's daughter will also need teaching on how to do tube feedings. IV antibiotics should be arranged through nephrology so she can get them with dialysis. Pt is currently medically ready for discharge.     E. Faecalis bacteremia  Leukocytosis  Elevated procalcitonin   Elevated CRP  On admission, WBC elevated to 17.4, Procal elevated to 1.3, and CRP elevated to 39.78.   Unclear infectious source. She has some mild non-specific urinary bladder wall thickening, but with ESRD makes very little urine. Also has a new MIKAYLA 3mm nodule which may be infectious or inflammatory, but no respiratory complaints.   * COVID, flu RSV pending   *UA very abnormal but difficult to interpret in the setting of ESRD. Ultimately Ucx positive for E faecalis. Could be initial source or seeding from bacteremia.  *Bcx positive for E. Faecalis. Unclear source. Concern for endocarditis given how quickly blood cultures are coming back positive  *TTE without vegetation seen.  *TTE completed 4/1 without concern for mass or vegetation.   - ID consulted for assistance   - Continue IV vancomycin   - Repeat Bcx until cleared, no growth since 3/29   - Per ID, plan to complete 6 week course with IV vancomycin.   - IV antibiotics should be arranged through nephrology/dialysis.     Abdominal pain  Retching, with Hx of GERD   Dysphagia s/p PEG tube (2/1/2025)   Constipation, rectal stool ball   Pt presents to the ED with vague complaints of retching/dry heaving without vomiting and some crampy lower abdominal pain.   * CT scan showed large rectal stool ball without evidence of obstruction. There was also some non-specific " mild urinary bladder wall thickening. No other intra-abdominal pathology.   - Unclear exactly what is going on. Pt did not have any retching or other symptoms while I was in the room. Perhaps she has some reflux of tube feeds or other esophageal issue.  - GI consulted for assessment:   - EGD on 3/30 without etiology to explain dysphagia   - XR Esophagram normal  - Continue Protonix 40 mg daily  - Continue bowel regimen to work on the constipation   - Zofran available PRN  * Pt still having these bouts of retching/coughing/dry heaving. Very unclear why this is happening. I have not personally seen one of these episodes nor have any of the nurses so its hard to say exactly what is going on. Will continue to monitor. No additional work-up needed currently.     End-stage renal disease on HD   Dialyzes MWF via L arm fistula. Per prior notes, volume removal has often been limited by intra dialytic hypotension and a fib with RVR   *Noted that patient has been getting short of breath on Sundays in the past and there was discussion of adding a fourth dialysis day on Saturdays. As of now, nephrology challenging dry weight to see if that helps.  - Nephrology consulted   - Continue PTA midodrine MWF before dialysis   - Continue PTA calcitriol, renvela, vit D.     Heart failure with preserved ejection fraction  Markedly elevated BNP without clinical features of exacerbation   Paroxysmal atrial fibrillation on chronic anticoagulation  Hypertension  Hyperlipidemia  Troponin elevation, Chronic Non-ischemic Myocardial Injury due to chronic heart failure with preserved ejection fraction and end-stage renal disease (85->85), no treatment indicated   [PTA medications include: amiodarone 200mg BID, amlodipine 10mg daily, Apixaban 5mg BID, atorvastatin 20mg daily, hydralazine 75mg Q8H,   *TTE 1/21/2025 with LVEF of 60%.  No valvular abnormality   * RHC 3/5/25 showing moderately elevated right and left-sided filling pressure; elevated  wedge pressure consistent with pulmonary hypertension  * On admission now, pt has markedly elevated BNP to 51,544 (though historically is in the 20,000-40,000 range), She does have small pleural effusions on CT, but no other overt evidence of volume overload or clinical HF. She is satting well on room air.  - I/Os and daily weights ordered  - continue PTA apixaban 5 mg BID  - continue PTA atorvastatin 20 mg daily  - continue PTA hydralazine 75 mg q8H  - continue PTA amiodarone 200 mg BID  - continue PTA amlodipine 10 m qday  - Volume management per nephrology    Vaginal bleeding   Pt has had intermittent vaginal bleeding this stay. Nursing and pt quite convinced it is in fact vaginal rather than rectal or urethral.   - transvaginal ultrasound ordered for further eval negative for any concerning findings.   - Recommend outpatient Gyn follow-up     Anxiety/depression/insomnia  - Continue PTA zoloft     Anemia of chronic disease  Hemoglobin 9.2 on admission, has trended down since admission to 7.8.   - Monitor CBC       Type II DM  - Continue PTA lantus 15 units BID   - MDSSI     Physical deconditioning from medical illness, senile frailty.  S/p left above-knee amputation, traumatic  Patient from TCU, daughter hopeful pt will be able to discharge back home with services rather than going back to TCU.   - PT/OT consulted   - Will plan for home discharge, pt will need      Sacrococcygeal Pressure injury, Stage 3, present on admission  Wound care team followed.  Pressure injury prevention.      Obesity with a BMI of 34.  Increase all-cause mortality and morbidity.    Documentation for necessity of medical equipment     Patient needs a Semi Electric bed   Patient has Sacrococcygeal Pressure injury, Stage 3, present on admission and requires positioning of the body to alleviate pain, that cannot be accommodated in an ordinary bed (e.g. pain scale).  Patient also requires protection from further skin break down that cannot  "be accommodated in an ordinary bed. She also has chronic G-tube feeding due to dysphagia a condition that requires the head of the bed to be elevated more than 30 degrees most of the time,  where pillows or wedges does not meet the patient s needs      Documentation of need for Rene Lift   The patient require the assistance of two persons in order to transfer between wheelchair, bed, commode. Without the use of the Rene lift, the patient will be bed confined.   The patient is unable to be transferred without a Rene lift due to  severe weakness, and lower extremity amputation. One caregiver cannot physically transfer pt. Yes, the lift will fit into all necessary areas in the patient s home                Diet: Adult Formula Drip Feeding: Continuous 8aweek Renal Support; Gastrostomy; Goal Rate: 65; mL/hr; From: 6:00 PM; To: 6:00 AM; 3/31: Decrease cycle time from 14 hrs to 12 hrs (6:00 pm to 6:00 am)  Renal Diet (dialysis)    DVT Prophylaxis: DOAC  Bradshaw Catheter: Not present  Lines: None     Cardiac Monitoring: ACTIVE order. Indication: Procedural area  Code Status: No CPR- Pre-arrest intubation OK      Clinically Significant Risk Factors         # Hyponatremia: Lowest Na = 131 mmol/L in last 2 days, will monitor as appropriate  # Hypochloremia: Lowest Cl = 92 mmol/L in last 2 days, will monitor as appropriate      # Hypoalbuminemia: Lowest albumin = 3.2 g/dL at 3/29/2025 11:39 AM, will monitor as appropriate       # Hypertension: Noted on problem list  # Chronic heart failure with preserved ejection fraction: heart failure noted on problem list and last echo with EF >50%          # DMII: A1C = 6.5 % (Ref range: <5.7 %) within past 6 months   # Obesity: Estimated body mass index is 34.91 kg/m  as calculated from the following:    Height as of this encounter: 1.676 m (5' 6\").    Weight as of this encounter: 98.1 kg (216 lb 4.3 oz).        # Financial/Environmental Concerns:           Social Drivers of Health  "   Tobacco Use: Medium Risk (3/30/2025)    Patient History     Smoking Tobacco Use: Former     Smokeless Tobacco Use: Never   Physical Activity: Insufficiently Active (7/19/2024)    Exercise Vital Sign     Days of Exercise per Week: 1 day     Minutes of Exercise per Session: 10 min   Social Connections: Unknown (7/19/2024)    Social Connection and Isolation Panel [NHANES]     Frequency of Social Gatherings with Friends and Family: More than three times a week          Disposition Plan     Medically Ready for Discharge: Anticipated in 2-4 Days pending evaluation of bacteremia             Ekaterina Gayle MD  Hospitalist Service  [facility]  Securely message with [employer] (more info)  Text page via [facility] Paging/Directory   ______________________________________________________________________    Interval History   Pt doing well today. No current concerns. Discussed plan to have equipment delivred to her daughters home which may take some time.       Physical Exam   Vital Signs: Temp: 98.3  F (36.8  C) Temp src: Oral BP: (!) 167/52 Pulse: 71   Resp: 20 SpO2: 100 % O2 Device: Nasal cannula Oxygen Delivery: 1 LPM  Weight: 216 lbs 4.34 oz  Constitutional: Awake, alert, cooperative, no apparent distress.  Pulmonary: No increased work of breathing, good air exchange  Cardiovascular: Regular rate and rhythm. LUE AV fistula   Skin/Integumen: No cyanosis or jaundice. No rashes noted.  Extremities: No lower extremity edema. L AKA.   Neuro: A&Ox4. Conversant. Responds appropriately in conversation. Moves all extremities with no focal deficit identified.      Medical Decision Making     45 MINUTES SPENT BY ME on the date of service doing chart review, history, exam, documentation & further activities per the note.      Data   ------------------------- PAST 24 HR DATA REVIEWED -----------------------------------------------    I have personally reviewed the following data over the past 24 hrs:    5.7  \    8.1 (L)   / 208     136 96 (L) 27.3 (H) /  178 (H)   3.9 29 2.51 (H) \       Imaging results reviewed over the past 24 hrs:   No results found for this or any previous visit (from the past 24 hours).

## 2025-04-05 NOTE — PROGRESS NOTES
Care Management Follow Up    Length of Stay (days): 8    Expected Discharge Date: 04/07/2025     Concerns to be Addressed:       Patient plan of care discussed at interdisciplinary rounds: Yes    Anticipated Discharge Disposition: Home, Home Care, Transitional Care              Anticipated Discharge Services: Transportation Services, Home Care  Anticipated Discharge DME:      Patient/family educated on Medicare website which has current facility and service quality ratings: no  Education Provided on the Discharge Plan:    Patient/Family in Agreement with the Plan: yes    Referrals Placed by CM/SW:    Private pay costs discussed: Not applicable    Discussed  Partnership in Safe Discharge Planning  document with patient/family: No     Handoff Completed: No, handoff not indicated or clinically appropriate    Additional Information:  Daughter Caroline asked to meet as she had many questions.     Update on DME Rene Lift and hospital bed.  Order on the front of the chart for the rene was faxed by the bedside nurse to [facility].  Writer faxed over facesheet via communications tab and the latest hospitalist note with the documentation via the routing option. Also faxed over the hospital bed order (printed from the chart review) by hand.    Concerns over transportation to [facility].  Will need wheelchair transport (has own w/c).  Check for insurance transportation benefit or provide Caroline with list of transportation companies.    3.   Had some issue when she was transported to [facility] from the TCU.  Need to call [facility] and see what the problem was.  Ensure a lift is available.  Had something to do with her stump.  Could it be proper sling for rene transfers?    4.    Would like home care for RN, OT and PT.  Referral sent to [facility].  She has a coccyx wound for follow up.    5.    Tube feeding supplies and teaching.  Informed her [facility] will follow with her for teaching and supplies    6.    Wants to know how to decrease  the tube feedings so she can increase oral intake better.  Who helps them in this post discharge?  PCP?  Dietician at [facility]?    7.    Has oxygen concentrator at home.  Will need portable oxygen for transportation to appointments and dialysis.  Need to contact [facility] to obtain one prior to discharge.  Also wants the oxymask to go with her home to use.  She has her CPAP at the TCU.  [facility] told her she needs a new Rx for CPAP.  She made an appointment for April 15 with Sleep clinic.  Told her hospital could not provide Rx and to keep appointment.    8.    Questions on medication management and .  Discharge nurse to do teaching on which meds are able to be crushed.  Can send  home with her.    9.    Had questions on Renal diet and how to follow.  Need to add to AVS and teaching by nurse or dietician prior to discharge.    10.   Also today's note refers to IV antibiotic to be done at dialysis at [facility].  Please send appropriate orders to [facility] at discharge.    11.   Caroline states she is hoping for increase in PCA hours and additional help at home; states  care manager working on.    12.    Also Caroline has patient's CPAP and other items at the TCU.  She is wondering if patient will have to return there prior to going home or will go home directly.  Explained to her likely directly home pending DME; she will wait to  until discharge DME is firm.    Explained to Caroline that orders were sent today for DME and we will follow up on Monday.  Likely not approved by insurance for 1-2 days once received from [facility] and they do not submit on the weekends.      Next Steps: Follow up on DME and multiple needs as noted above.        Dominique Burleson, RN  Inpatient Float Care Coordinator  [facility]  [email or web address]

## 2025-04-05 NOTE — PROVIDER NOTIFICATION
Pt and family called writer into room, stating not feeling well. Pt felt off and more confused, sleepy. MD Gayle notified. Pending order. CXR result was back. MD notified as well.     VBG ordered --result communicated to oncall MD Munoz. Pt is conversational. A&O x4. Will try to get pt to wear Cpap longer tonight (only 4 hours last night). RT was notified of a better/more comfortable mask. Pt and daughter were educated on the importance of using the Cpap when sleeping. Both agreed to the plan. Pt also had been using the IS more frequently for concern of Atelectasis on CXR.

## 2025-04-05 NOTE — PLAN OF CARE
DATE & TIME: 4/5/2025 [id number]    Cognitive Concerns/ Orientation : A&O x4.    BEHAVIOR & AGGRESSION TOOL COLOR: green   CIWA SCORE: na    ABNL VS/O2: HTN, PRN available for SBP >180, did not meet parameter. On 1 liter NS\oxymask. C/o SOB, DEVRIES. Lungs clear. MD aware, PCXR done. Result pending. Using IS and flutter valve as instructed. Productive cough with yellow and clear sputum.   MOBILITY: lift, able to assist in turn. Turn  Q 2 hours while in bed.   PAIN MANAGMENT: denied pain.   DIET: renal. TF from 6pm to 6am. Poor appetite.   BOWEL/BLADDER: continent of stool. Using bedpan. Large BM this shift.   ABNL LAB/BG: pending CXR result. Cr. 2.51. /161.   DRAIN/DEVICES: fistula on left arm, peg tube. PIV SL.   TELEMETRY RHYTHM: NSR.   SKIN: coccyx open area and left buttock wound, dressing changed per POC. Left AKA.   TESTS/PROCEDURES: CXR   D/C DATE: pending home equipment-paperwork signed and faxed to home medical today by writer.   Discharge Barriers: discharge planning--daughter, Caroline, will be taking pt home. Caroline had a lot of questions and logistics to workout. Care coordinator following.   OTHER IMPORTANT INFO: GEN COLEMAN.

## 2025-04-05 NOTE — PLAN OF CARE
Goal Outcome Evaluation:     SUMMARY: Abdominal pain and retching    DATE & TIME: 4/4-5/25, [id number]    Cognitive Concerns/ Orientation : A&O x4   BEHAVIOR & AGGRESSION TOOL COLOR: Green  CIWA SCORE: N/A   ABNL VS/O2: VSS on 1L, ex. HTN. Desats slightly on RA.  MOBILITY: Ax2, lift; turn and repo q2hr- pt. Able to shift weight in bed.  PAIN MANAGMENT: Denies  DIET: Renal, NOC TF started at 2200 to 1000- formula was not available  BOWEL/BLADDER: Incont Bowel at times; on hemodialysis- doesn't make much urine.  ABNL LAB/BG:   DRAIN/DEVICES: R PIV; L fistula; Gj tube infusing  SKIN: scattered bruising; cracked, peeling R foot/toe;wound/scab on sacrum/coccyx-   WOC RN consult; scab to left stump - L AKA  TESTS/PROCEDURES: Dialysis tyesterday- took off 2L.   D/C DAY/GOALS/PLACE: Pending. Home with home care and help from family.  OTHER IMPORTANT INFO: care coordinator working on getting discharge plan in order.  Needs to ensure equipment can be delivered to home before discharge.

## 2025-04-06 ENCOUNTER — APPOINTMENT (OUTPATIENT)
Dept: GENERAL RADIOLOGY | Facility: CLINIC | Age: 75
End: 2025-04-06
Payer: COMMERCIAL

## 2025-04-06 LAB
BASE EXCESS BLDV CALC-SCNC: 4.8 MMOL/L (ref -3–3)
GLUCOSE BLDC GLUCOMTR-MCNC: 138 MG/DL (ref 70–99)
GLUCOSE BLDC GLUCOMTR-MCNC: 144 MG/DL (ref 70–99)
GLUCOSE BLDC GLUCOMTR-MCNC: 160 MG/DL (ref 70–99)
GLUCOSE BLDC GLUCOMTR-MCNC: 225 MG/DL (ref 70–99)
GLUCOSE BLDC GLUCOMTR-MCNC: 99 MG/DL (ref 70–99)
HCO3 BLDV-SCNC: 33 MMOL/L (ref 21–28)
O2/TOTAL GAS SETTING VFR VENT: 30 %
OXYHGB MFR BLDV: 90 % (ref 70–75)
PCO2 BLDV: 62 MM HG (ref 40–50)
PH BLDV: 7.33 [PH] (ref 7.32–7.43)
PO2 BLDV: 65 MM HG (ref 25–47)
SAO2 % BLDV: 92.8 % (ref 70–75)

## 2025-04-06 PROCEDURE — 99291 CRITICAL CARE FIRST HOUR: CPT

## 2025-04-06 PROCEDURE — 82805 BLOOD GASES W/O2 SATURATION: CPT

## 2025-04-06 PROCEDURE — 250N000013 HC RX MED GY IP 250 OP 250 PS 637: Performed by: HOSPITALIST

## 2025-04-06 PROCEDURE — 250N000013 HC RX MED GY IP 250 OP 250 PS 637: Performed by: INTERNAL MEDICINE

## 2025-04-06 PROCEDURE — 71045 X-RAY EXAM CHEST 1 VIEW: CPT

## 2025-04-06 PROCEDURE — 250N000011 HC RX IP 250 OP 636

## 2025-04-06 PROCEDURE — 999N000157 HC STATISTIC RCP TIME EA 10 MIN

## 2025-04-06 PROCEDURE — 99232 SBSQ HOSP IP/OBS MODERATE 35: CPT | Performed by: HOSPITALIST

## 2025-04-06 PROCEDURE — 120N000013 HC R&B IMCU

## 2025-04-06 PROCEDURE — 99232 SBSQ HOSP IP/OBS MODERATE 35: CPT | Performed by: INTERNAL MEDICINE

## 2025-04-06 PROCEDURE — 250N000013 HC RX MED GY IP 250 OP 250 PS 637: Performed by: STUDENT IN AN ORGANIZED HEALTH CARE EDUCATION/TRAINING PROGRAM

## 2025-04-06 PROCEDURE — 94660 CPAP INITIATION&MGMT: CPT

## 2025-04-06 PROCEDURE — 36415 COLL VENOUS BLD VENIPUNCTURE: CPT

## 2025-04-06 PROCEDURE — 5A09357 ASSISTANCE WITH RESPIRATORY VENTILATION, LESS THAN 24 CONSECUTIVE HOURS, CONTINUOUS POSITIVE AIRWAY PRESSURE: ICD-10-PCS | Performed by: STUDENT IN AN ORGANIZED HEALTH CARE EDUCATION/TRAINING PROGRAM

## 2025-04-06 RX ORDER — LORAZEPAM 2 MG/ML
0.5 INJECTION INTRAMUSCULAR ONCE
Status: COMPLETED | OUTPATIENT
Start: 2025-04-06 | End: 2025-04-06

## 2025-04-06 RX ADMIN — INSULIN ASPART 2 UNITS: 100 INJECTION, SOLUTION INTRAVENOUS; SUBCUTANEOUS at 07:48

## 2025-04-06 RX ADMIN — LORAZEPAM 0.5 MG: 2 INJECTION INTRAMUSCULAR; INTRAVENOUS at 05:52

## 2025-04-06 RX ADMIN — AMLODIPINE BESYLATE 10 MG: 10 TABLET ORAL at 15:11

## 2025-04-06 RX ADMIN — SENNOSIDES 2 TABLET: 8.6 TABLET ORAL at 20:01

## 2025-04-06 RX ADMIN — INSULIN GLARGINE 12 UNITS: 100 INJECTION, SOLUTION SUBCUTANEOUS at 20:03

## 2025-04-06 RX ADMIN — CETIRIZINE HYDROCHLORIDE 10 MG: 10 TABLET, FILM COATED ORAL at 22:10

## 2025-04-06 RX ADMIN — INSULIN GLARGINE 12 UNITS: 100 INJECTION, SOLUTION SUBCUTANEOUS at 07:49

## 2025-04-06 RX ADMIN — AMIODARONE HYDROCHLORIDE 200 MG: 200 TABLET ORAL at 20:01

## 2025-04-06 RX ADMIN — HYDRALAZINE HYDROCHLORIDE 75 MG: 25 TABLET ORAL at 05:19

## 2025-04-06 RX ADMIN — HYDRALAZINE HYDROCHLORIDE 75 MG: 25 TABLET ORAL at 15:11

## 2025-04-06 RX ADMIN — SERTRALINE HYDROCHLORIDE 75 MG: 50 TABLET ORAL at 15:11

## 2025-04-06 RX ADMIN — APIXABAN 5 MG: 5 TABLET, FILM COATED ORAL at 20:01

## 2025-04-06 RX ADMIN — Medication 50 MCG: at 15:11

## 2025-04-06 RX ADMIN — INSULIN ASPART 1 UNITS: 100 INJECTION, SOLUTION INTRAVENOUS; SUBCUTANEOUS at 11:41

## 2025-04-06 RX ADMIN — HYDRALAZINE HYDROCHLORIDE 75 MG: 25 TABLET ORAL at 22:10

## 2025-04-06 RX ADMIN — ATORVASTATIN CALCIUM 20 MG: 20 TABLET, FILM COATED ORAL at 20:01

## 2025-04-06 RX ADMIN — POLYETHYLENE GLYCOL 3350 17 G: 17 POWDER, FOR SOLUTION ORAL at 20:01

## 2025-04-06 RX ADMIN — GUAIFENESIN AND DEXTROMETHORPHAN HYDROBROMIDE 1 TABLET: 600; 30 TABLET, EXTENDED RELEASE ORAL at 15:27

## 2025-04-06 ASSESSMENT — ACTIVITIES OF DAILY LIVING (ADL)
ADLS_ACUITY_SCORE: 95
ADLS_ACUITY_SCORE: 95
ADLS_ACUITY_SCORE: 93
ADLS_ACUITY_SCORE: 95
ADLS_ACUITY_SCORE: 93
ADLS_ACUITY_SCORE: 95
ADLS_ACUITY_SCORE: 95
ADLS_ACUITY_SCORE: 93
ADLS_ACUITY_SCORE: 95
ADLS_ACUITY_SCORE: 93
ADLS_ACUITY_SCORE: 93
ADLS_ACUITY_SCORE: 95
ADLS_ACUITY_SCORE: 93
ADLS_ACUITY_SCORE: 95

## 2025-04-06 NOTE — PROGRESS NOTES
Transfer of care accepted.  VSS, on BiPAP. Patient lethargic- given Ativan before transfer to unit.  Referring skin check to oncoming nurse.  Patient meds placed in bin.

## 2025-04-06 NOTE — PROGRESS NOTES
"[facility]    Medicine Progress Note - Hospitalist Service    Date of Admission:  3/27/2025    Assessment & Plan   Supriya Herr is a 75 year old female with very complex past medical history including end-stage renal disease on hemodialysis, HFpEF, paroxysmal atrial fibrillation, hypertension, hyperlipidemia, anxiety, depression, chronic anemia, dysphagia status post PEG tube, type 2 diabetes, left traumatic AKA and recent hospitalization with multifactorial respiratory failure now being admitted on 3/27/2025 with abdominal pain and retching. She is found to have e/o constipation. But other evaluation notable for elevated WBC, procal and CRP of unclear etiology.      Pf note, pt with multiple recent hospitalizations. Per last discharge summary:   \"Hospitalized at [facility] from 3/2 - 3/6/2025 with shortness of breath likely multifactorial.  Volume status was difficult to determine, underwent right heart cath on 3/5 showed mildly elevated right atrial and PCWP readings.  Was treated for acute respiratory failure likely from heart failure exacerbation, atrial fibrillation, end-stage renal disease and discharged to care facility with supplemental nocturnal oxygen.\"  \"Prolonged hospitalized at [facility] from 1/8 to 2/26/2025 for acute hypoxic respiratory failure multifactorial, was intubated ( 1/9-1/18), reintubated (1/20 - 1/25).  Then was treated for shock with pressors, influenza A pneumonia, hospital-acquired pneumonia with intravenous antibiotics, steroids, antivirals.  Then also noted to have heart failure exacerbation, recurrent A-fib with RVR, subsequent bradycardia.  Then also noted to have encephalopathy likely from infectious, metabolic etiology and delirium.  During that hospitalization was noted to have dysphagia, pharyngeal edema, was evaluated by ENT on 2/16.  No findings to explain dysphagia.  G-tube was placed by IR on 2/17 and was on nocturnal tube feeds.  Discharged to TCU for " "rehabilitation.\"     Dispo: Pt's daughter prefers her to return home rather than TCU. Pt will need a number of home medical supplies prior to being able to go home. These have been ordered in the discharge navigator. It may take some time for them to be delivered to her home. Appreciate social work and care coordinator help in arranging this. Pt's daughter will also need teaching on how to do tube feedings. IV antibiotics should be arranged through nephrology so she can get them with dialysis.     *4/6 - Patient has been medically ready for discharge but had sensation of SOB despite sats ok and RRT called early AM  - see their  note for details. CXR and blood gas fairly unremarkable. Transfer[place] [facility] and [place] bipap. Some improvement with atarax.   - Continue close monitoring and regimen below     E. Faecalis bacteremia  Leukocytosis  Elevated procalcitonin   Elevated CRP  On admission, WBC elevated to 17.4, Procal elevated to 1.3, and CRP elevated to 39.78.   Unclear infectious source. She has some mild non-specific urinary bladder wall thickening, but with ESRD makes very little urine. Also has a new MIKAYLA 3mm nodule which may be infectious or inflammatory, but no respiratory complaints.   * COVID, flu RSV pending   *UA very abnormal but difficult to interpret in the setting of ESRD. Ultimately Ucx positive for E faecalis. Could be initial source or seeding from bacteremia.  *Bcx positive for E. Faecalis. Unclear source. Concern for endocarditis given how quickly blood cultures are coming back positive  *TTE without vegetation seen.  *TTE completed 4/1 without concern for mass or vegetation.   - ID consulted for assistance               - Continue IV vancomycin               - Repeat Bcx until cleared, no growth since 3/29               - Per ID, plan to complete 6 week course with IV vancomycin.   - IV antibiotics should be arranged through nephrology/dialysis.      Abdominal pain  Retching, with Hx of GERD "   Dysphagia s/p PEG tube (2/1/2025)   Constipation, rectal stool ball   Pt presents to the ED with vague complaints of retching/dry heaving without vomiting and some crampy lower abdominal pain.   * CT scan showed large rectal stool ball without evidence of obstruction. There was also some non-specific mild urinary bladder wall thickening. No other intra-abdominal pathology.   - Unclear exactly what is going on. Pt did not have any retching or other symptoms while I was in the room. Perhaps she has some reflux of tube feeds or other esophageal issue.  - GI consulted for assessment:               - EGD on 3/30 without etiology to explain dysphagia               - XR Esophagram normal  - Continue Protonix 40 mg daily  - Continue bowel regimen to work on the constipation   - Zofran available PRN  * Pt still having these bouts of retching/coughing/dry heaving. Very unclear why this is happening. I have not personally seen one of these episodes nor have any of the nurses so its hard to say exactly what is going on. Will continue to monitor. No additional work-up needed currently.      End-stage renal disease on HD   Dialyzes MWF via L arm fistula. Per prior notes, volume removal has often been limited by intra dialytic hypotension and a fib with RVR   *Noted that patient has been getting short of breath on Sundays in the past and there was discussion of adding a fourth dialysis day on Saturdays. As of now, nephrology challenging dry weight to see if that helps.  - Nephrology consulted   - Continue PTA midodrine MWF before dialysis   - Continue PTA calcitriol, renvela, vit D.      Heart failure with preserved ejection fraction  Markedly elevated BNP without clinical features of exacerbation   Paroxysmal atrial fibrillation on chronic anticoagulation  Hypertension  Hyperlipidemia  Troponin elevation, Chronic Non-ischemic Myocardial Injury due to chronic heart failure with preserved ejection fraction and end-stage renal  disease (85->85), no treatment indicated   [PTA medications include: amiodarone 200mg BID, amlodipine 10mg daily, Apixaban 5mg BID, atorvastatin 20mg daily, hydralazine 75mg Q8H,   *TTE 1/21/2025 with LVEF of 60%.  No valvular abnormality   * RHC 3/5/25 showing moderately elevated right and left-sided filling pressure; elevated wedge pressure consistent with pulmonary hypertension  * On admission now, pt has markedly elevated BNP to 51,544 (though historically is in the 20,000-40,000 range), She does have small pleural effusions on CT, but no other overt evidence of volume overload or clinical HF. She is satting well on room air.  - I/Os and daily weights ordered  - continue PTA apixaban 5 mg BID  - continue PTA atorvastatin 20 mg daily  - continue PTA hydralazine 75 mg q8H  - continue PTA amiodarone 200 mg BID  - continue PTA amlodipine 10 m qday  - Volume management per nephrology     Vaginal bleeding   Pt has had intermittent vaginal bleeding this stay. Nursing and pt quite convinced it is in fact vaginal rather than rectal or urethral.   - transvaginal ultrasound ordered for further eval negative for any concerning findings.   - Recommend outpatient Gyn follow-up      Anxiety/depression/insomnia  - Continue PTA zoloft      Anemia of chronic disease  Hemoglobin 9.2 on admission, has trended down since admission to 7.8.   - Monitor CBC      Type II DM  - Continue PTA lantus 15 units BID   - MDSSI      Physical deconditioning from medical illness, senile frailty.  S/p left above-knee amputation, traumatic  Patient from TCU, daughter hopeful pt will be able to discharge back home with services rather than going back to TCU.   - PT/OT consulted   - Will plan for home discharge, pt will need      Sacrococcygeal Pressure injury, Stage 3, present on admission  Wound care team followed.  Pressure injury prevention.      Obesity with a BMI of 34.  Increase all-cause mortality and morbidity.       DME (Durable Medical  "Equipment) Orders and Documentation  Orders Placed This Encounter   Procedures    Miscellaneous DME Order    Hospital Bed      The patient was assessed and it was determined the patient is in need of the following listed DME Supplies/Equipment. Please complete supporting documentation below to demonstrate medical necessity.      Hospital Bed/Accessories Documentation  Hospital bed is required for body positioning, to allow for safe transfers to wheelchair and standing and frequent changes in body position, not feasible in an ordinary bed     NOTE: Patient must have a \"Yes\" in one of the four following questions to qualify for a hospital bed.    1. Does the patient require positioning of the body in ways not feasible with an ordinary bed due to a medical condition that is expected to last at least 1 month? Yes (Please explain): multiple comorbidities as above    2. Does the patient require, for the alleviation of pain, positioning of the body in ways not feasible with an ordinary bed? Yes (Please explain): see above     3. Does the patient require the head of the bed to be elevated more than 30 degrees most of the time due to congestive heart failure, chronic pulmonary disease, or aspiration? Yes (Please explain): tube feeding and heart failure    4. Does the patient require traction that can only be attached to a hospital bed? No    Additional Criteria:    Does the patient require frequent changes in body position and/or have an immediate need for change in body position? Yes - Patient qualifies for Semi Electric Bed     Trapeze Criteria:  (Patient must meet standard hospital bed criteria also)   1. Does patient need this device to sit up because of a respiratory condition, for change in body position for other medical reasons, or to get in or out of bed? No       Documentation for necessity of medical equipment      Patient needs a Semi Electric bed    Patient has Sacrococcygeal Pressure injury, Stage 3, present on " admission  requires positioning of the body to alleviate pain, that cannot be accommodated in an  ordinary bed (e.g. pain scale); and   b) Protection from further skin break down that cannot be accommodated in an ordinary bed; and has chronic G-tube feeding due to dysphagia a condition that requires the head of the bed to be elevated more than 30 degrees most of the time,  where pillows or wedges does not meet the patient s needs  Patient needs . traction equipment) that cannot be fixed and used on  an ordinary bed;      Associated ICD-10 codes (list):   L89.93 Pressure Ulcer Stage 3  CHFCKD HTN  I13.2     Documentation of need for Rene Lift   A. The patient require the assistance of two persons in order to transfer between wheelchair, bed, commode  B. Without the use of the Rene lift, the patient be bed confined   C.  The patient is unable to be transferred without a Rene lift due to  severe weakness,CHF and ESRD, one caregiver cannot physically transfer pt--.  Yes, the lift will fit into all necessary areas in the patient s home            Diet: Adult Formula Drip Feeding: Continuous [employer] Renal Support; Gastrostomy; Goal Rate: 65; mL/hr; From: 6:00 PM; To: 6:00 AM; 3/31: Decrease cycle time from 14 hrs to 12 hrs (6:00 pm to 6:00 am)  Renal Diet (dialysis)    DVT Prophylaxis: DOAC  Bradshaw Catheter: Not present  Lines: None     Cardiac Monitoring: ACTIVE order. Indication: Procedural area  Code Status: No CPR- Pre-arrest intubation OK      Clinically Significant Risk Factors          # Hypochloremia: Lowest Cl = 96 mmol/L in last 2 days, will monitor as appropriate      # Hypoalbuminemia: Lowest albumin = 3.2 g/dL at 3/29/2025 11:39 AM, will monitor as appropriate     # Hypertension: Noted on problem list  # Chronic heart failure with preserved ejection fraction: heart failure noted on problem list and last echo with EF >50%          # DMII: A1C = 6.5 % (Ref range: <5.7 %) within past 6 months   # Obesity:  "Estimated body mass index is 34.91 kg/m  as calculated from the following:    Height as of this encounter: 1.676 m (5' 6\").    Weight as of this encounter: 98.1 kg (216 lb 4.3 oz).      # Financial/Environmental Concerns:           Social Drivers of Health    Tobacco Use: Medium Risk (3/30/2025)    Patient History     Smoking Tobacco Use: Former     Smokeless Tobacco Use: Never   Physical Activity: Insufficiently Active (7/19/2024)    Exercise Vital Sign     Days of Exercise per Week: 1 day     Minutes of Exercise per Session: 10 min   Social Connections: Unknown (7/19/2024)    Social Connection and Isolation Panel [NHANES]     Frequency of Social Gatherings with Friends and Family: More than three times a week          Disposition Plan     Medically Ready for Discharge: Anticipated in 2-4 Days             Blaise Holden MD  Hospitalist Service  [facility]  Securely message with [employer] (more info)  Text page via [employer] Paging/Directory   ______________________________________________________________________    Interval History   Care assumed today. Seen and examined in IMC. Resting comfortably on NIPPV. Denies new issues - breathing improving, no new pain; denied fevers chills. Reports that she wants to rest a little longer.    Physical Exam   Vital Signs: Temp: 98.4  F (36.9  C) Temp src: Axillary BP: 139/41 Pulse: 65   Resp: 18 SpO2: 95 % O2 Device: BiPAP/CPAP Oxygen Delivery: 8 LPM  Weight: 216 lbs 4.34 oz    Gen: NAD, replying with mask in place  HEENT: EOMI, MMM  Resp: no focal crackles, no wheezes, no increased work of resp  CV: S1S2 heard, reg rhythm, reg rate  Abdo: soft, nontender, nondistended, bowel sounds present  Ext: perfused  Neuro: aa, conversant, CN grossly intact, no facial asymmetry      Medical Decision Making       39 MINUTES SPENT BY ME on the date of service doing chart review, history, exam, documentation & further activities per the note.      Data         Imaging " results reviewed over the past 24 hrs:   Recent Results (from the past 24 hours)   XR Chest Port 1 View    Narrative    EXAM: XR CHEST PORT 1 VIEW  LOCATION: [facility]  DATE: 4/5/2025    INDICATION: Cough, shortness of breath  COMPARISON: 3/9/2025 radiograph. 3/27/2025 CT.      Impression    IMPRESSION: No pneumothorax. A left basilar opacity likely represents atelectasis. Underlying airspace disease is also possible. There is interstitial edema. The cardiomediastinal silhouette is at the upper limits of normal in size. Mitral annular   calcification is present. There is a left axillary vascular stent.   XR Chest Port 1 View    Narrative    EXAM: XR CHEST PORT 1 VIEW  LOCATION: [facility]  DATE: 4/6/2025    INDICATION: Worsening SOB  COMPARISON: 4/5/2025      Impression    IMPRESSION: Stable elevated right hemidiaphragm. Mild bibasilar atelectasis. No pleural effusion. Stable enlarged cardiac silhouette. Stable mild pulmonary vascular congestion. Left axillary stent.

## 2025-04-06 NOTE — PLAN OF CARE
Goal Outcome Evaluation:       SUMMARY: Abdominal pain and retching    DATE & TIME: 4/5/25, [id number]    Cognitive Concerns/ Orientation : A&O x4   BEHAVIOR & AGGRESSION TOOL COLOR: Green  CIWA SCORE: N/A   ABNL VS/O2: VSS except elevated BP on 1L, ex. HTN. Desats slightly on RA.  MOBILITY: Ax2, lift; turn and repo q2hr- pt. Able to shift weight in bed.  PAIN MANAGMENT: Denies  DIET: Renal, NOC TF from [id number]  BOWEL/BLADDER: Incontinent, no BM on hemodialysis  ABNL LAB/BG:   DRAIN/DEVICES: R PIV SL; L fistula; Gj tube infusing  SKIN: scattered bruising; cracked, peeling R foot/toe;wound/scab on sacrum/coccyx-   WOC RN consult; scab to left stump - L AKA  TESTS/PROCEDURES: Dialysis yesterday- took off 2L.   D/C DAY/GOALS/PLACE: Pending. Home with home care and help from family.  OTHER IMPORTANT INFO: care coordinator working on getting discharge plan in order.  Needs to ensure equipment can be delivered to home before discharge. Hemodialysis- M-W-F

## 2025-04-06 NOTE — PROGRESS NOTES
"Date & Time: 4/5/25 4116-2465  Summary: Abdominal pain and retching  Orientation/Cognitive: A&Ox2  Mobility Level/Assist Equipment: A2/Lift turn and repo  Fall Risk (Y/N): Yes  Behavior Concerns: Frequently yelling/screaming out for help with frequent call lights use  Pain Management: Denies  Tele/VS/O2: Patient is hypertensive, schedule hydralazine given per MAR. Unable to tolerate CPAP machine throughout the shift. Oxymask from 3L-8L until [place] [facility] [place]: NSR ( HX of Afib and is known to be in Afib on tele at times)  ABNL Lab/BG: PCO2 62, PO2 venous 65, O2 Sat venous 92.8, Bicarb Venous 33, Hgb 8.1  Diet: Renal; TF from 6 am to 6 pm  Bowel/Bladder: Incontinent  Skin Concerns: Generalized scattered bruising and scabs. Cracked and peeling R foot/toe. Sacrum/coccyx wounds. Left AKA stump wound  Drains/Devices: R PIV SL. LUE fistula with thrill and bruit present. Peg tube infusing TF at goal rate of 65 ml/hr from 6 am to 6 pm.  Tests/Procedures for next shift: None  Anticipated DC date & active delays: TBD  Other Important Information: RRT was called at 0530 due to the patient complaining of being difficulty breathing despite being on a oxymask between 3-6L of oxygen with adequate oxygen saturation between 96-98%. Did not tolerate her CPAP mask all night long. Respiratory therapy did adjusted her CPAP mask multiples times during the shift but the patient still had discomfort keeping her CPAP on. PRN ativan given once prior to [place] the [place] [facility] [place] and TF turned off.        BP (!) 176/60   Pulse 81   Temp 98.6  F (37  C) (Oral)   Resp 18   Ht 1.676 m (5' 6\")   Wt 98.1 kg (216 lb 4.3 oz)   LMP  (LMP Unknown)   SpO2 100%   BMI 34.91 kg/m      "

## 2025-04-06 NOTE — PLAN OF CARE
"Patient Name: Geronimo  MRN: [id number]  Date of Admission: 3/27/2025  Reason for Admission: Abdominal pain, constipation.  Level of Care: [facility] [place].     Vitals:   BP Readings from Last 1 Encounters:   04/06/25 139/41     Pulse Readings from Last 1 Encounters:   04/06/25 65     Wt Readings from Last 1 Encounters:   04/02/25 98.1 kg (216 lb 4.3 oz)     Ht Readings from Last 1 Encounters:   03/27/25 1.676 m (5' 6\")     Estimated body mass index is 34.91 kg/m  as calculated from the following:    Height as of this encounter: 1.676 m (5' 6\").    Weight as of this encounter: 98.1 kg (216 lb 4.3 oz).  Temp Readings from Last 1 Encounters:   04/06/25 98.4  F (36.9  C) (Axillary)     Pain: Pain goal 0. Pain Rating 0/10. Effective pain medication/regimen N/A, denies pain.    CV Surgery Patient: No    Resp: LS clear, dim in bases. On BiPAP and oxymask/NC throughout the day, currently on 2 L oxymask. C/o intermittent SOB. Intermittent productive cough, PRN mucinex given x1.   Telemetry: NSR. AVSS except HTN.   Neuro: A&O x2, disoriented to time and situation. Lethargic this morning, but has since resolved. Can be hard to understand speech when on BiPAP.   GI/: Incontinent b/b. Pt on hemodialysis M/W/F, oliguric. Smear of BM x3 this shift, passing flatus. PEG tube in place for nocturnal feeds 5408-5531 at goal rate of 65 mL/hr w/ 60 mL free water flush Q4H. Did not start TF until 1845 as pt came off BiPAP at 1800. Denies nausea, no retching observed. NPO while on BiPAP, otherwise renal diet. Crushing pills through PEG tube per pt request.   Skin/Wounds: Skin tear on R wrist, mepi in place, CDI. Scabbing on L AKA stump. Coccyx wound, mepi in place, wound cares done per orders. L arm fistula. L AKA. PEG tube CDI. Dual RN skin check complete.   Lines/Drains: PIV SL.   Activity: Ax2 w/ lift, turn/repo.   Abnormal Labs: CO2 62. HCO3 33. BS ACHS.     Aggression Stop Light: Green          Patient Care Plan: Continue O2 support and " wean as able. Will have dialysis run tomorrow. Plan is for discharge home w/ home care and medical equipment when medically ready.

## 2025-04-06 NOTE — PROGRESS NOTES
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      2) Resp Distress:  She does not appear  volume overloaded  by exam or CXR.  She does have audible exp wheeze. Chronic resp acidosis on VBG.      3) BP control OK.    4) Anemia:  HGB 8.1    Plan:    Next HD  tomorrow as  planned.    Dusty Prieto MD  [facility] - Nephrology  [phone]    Interval History     RRT noted.    Did not tolerate CPAP  last night.    CXR:  Stable elevated right hemidiaphragm. Mild bibasilar atelectasis. No pleural effusion. Stable enlarged cardiac silhouette. Stable mild pulmonary vascular congestion. Left axillary stent.     VBG: Chronic resp acidosis.      Exp wheeze on exam      Physical Exam   Temp: 97.8  F (36.6  C) Temp src: Axillary BP: (!) 151/45 Pulse: 69   Resp: 18 SpO2: 100 % O2 Device: Oxymask Oxygen Delivery: 6 LPM  Vitals:    03/31/25 0516 04/02/25 0600 04/02/25 1445   Weight: 94 kg (207 lb 3.7 oz) 93.8 kg (206 lb 14.4 oz) 98.1 kg (216 lb 4.3 oz)     Vital Signs with Ranges  Temp:  [97.8  F (36.6  C)-98.6  F (37  C)] 97.8  F (36.6  C)  Pulse:  [65-81] 69  Resp:  [18-20] 18  BP: (139-176)/(41-76) 151/45  FiO2 (%):  [40 %-60 %] 40 %  SpO2:  [93 %-100 %] 100 %  I/O last 3 completed shifts:  In: 1837 [P.O.:120; NG/GT:1717]  Out: 0     GENERAL APPEARANCE: resting in bed, oxygen mask on.  HEENT:  Eyes/ears/nose/neck grossly normal  RESP: lungs coarse exp wheezes  CV: RRR, nl S1/S2, no m/r/g   ABDOMEN: o/s/nt/nd, bs present  EXTREMITIES/SKIN: no rashes/lesions; no edema    Medications   Current Facility-Administered Medications   Medication Dose Route Frequency Provider Last Rate Last Admin    dextrose 10% infusion   Intravenous Continuous PRN Ekaterina Gayle MD        May take oral meds with a sip of water, the morning of SHAHNAZ procedure.   Does not apply Continuous PRN Ganesh Lwason MD         Current Facility-Administered Medications    Medication Dose Route Frequency Provider Last Rate Last Admin    - MEDICATION INSTRUCTIONS for Dialysis Patients -   Does not apply See Admin Instructions Ekaterina Gayle MD        amiodarone (PACERONE) tablet 200 mg  200 mg Oral or Feeding Tube BID Ekaterina Gayle MD   200 mg at 04/05/25 2053    amLODIPine (NORVASC) tablet 10 mg  10 mg Oral or Feeding Tube Once per day on Sunday Tuesday Thursday Saturday Ekaterina Gayle MD   10 mg at 04/05/25 0906    apixaban ANTICOAGULANT (ELIQUIS) tablet 5 mg  5 mg Oral or Feeding Tube BID Ekaterina Gayle MD   5 mg at 04/05/25 2053    atorvastatin (LIPITOR) tablet 20 mg  20 mg Oral or Feeding Tube QPM Ekaterina Gayle MD   20 mg at 04/05/25 2053    calcitRIOL (ROCALTROL) capsule 0.25 mcg  0.25 mcg Oral Once per day on Monday Wednesday Friday Ekaterina Gayle MD   0.25 mcg at 04/04/25 1240    cetirizine (zyrTEC) tablet 10 mg  10 mg Oral or Feeding Tube At Bedtime Ekaterina Gayle MD   10 mg at 04/05/25 2107    hydrALAZINE (APRESOLINE) tablet 75 mg  75 mg Oral or Feeding Tube Q8H Ekaterina Gayle MD   75 mg at 04/06/25 0519    insulin aspart (NovoLOG) injection (RAPID ACTING)  1-7 Units Subcutaneous TID AC Blaise Holden MD   1 Units at 04/06/25 1141    insulin aspart (NovoLOG) injection (RAPID ACTING)  1-5 Units Subcutaneous At Bedtime Blaise Holden MD        insulin glargine (LANTUS PEN) injection 12 Units  12 Units Subcutaneous BID Ekaterina Gayle MD   12 Units at 04/06/25 0749    midodrine (PROAMATINE) tablet 5 mg  5 mg Oral Once per day on Monday Wednesday Friday Ekaterina Gayle MD   5 mg at 04/04/25 1239    pantoprazole (PROTONIX) 2 mg/mL suspension 40 mg  40 mg Per Feeding Tube QAM AC Ekaterina Gayle MD   40 mg at 04/05/25 0911    polyethylene glycol (MIRALAX) Packet 17 g  17 g Oral or Feeding Tube BID Ekaterina Gayle MD   17 g at 04/05/25 0906    [Held by provider] Prosource TF20 ENfit Compatibl EN LIQD (PROSOURCE  TF20) packet 60 mL  1 packet Per Feeding Tube Daily Ekaterina Gayle MD   60 mL at 03/31/25 1407    sennosides (SENOKOT) tablet 2 tablet  2 tablet Oral BID Blaise Holden MD   2 tablet at 04/05/25 2053    sertraline (ZOLOFT) tablet 75 mg  75 mg Oral or Feeding Tube Daily Ekaterina Gayle MD   75 mg at 04/05/25 0906    sevelamer carbonate (RENVELA) tablet 1,600 mg  1,600 mg Oral TID w/meals Ekaterina Gayle MD   1,600 mg at 04/05/25 0906    sodium chloride (PF) 0.9% PF flush 3 mL  3 mL Intravenous Q8H Ganesh Lawson MD   3 mL at 04/06/25 1009    sodium chloride (PF) 0.9% PF flush 3 mL  3 mL Intracatheter Q8H [facility] Ekaterina Gayle MD   3 mL at 04/06/25 0527    vancomycin place cardoso - receiving intermittent dosing  1 each Intravenous See Admin Instructions Ekaterina Gayle MD        vitamin D3 (CHOLECALCIFEROL) tablet 50 mcg  50 mcg Oral or Feeding Tube Daily Ekaterina Gayle MD   50 mcg at 04/05/25 0906       Data   BMP  Recent Labs   Lab 04/06/25  1101 04/06/25  0722 04/06/25  0134 04/05/25  2108 04/05/25  1137 04/05/25  0607 04/04/25  1205 04/04/25  0629 04/02/25  1342 04/02/25  0759 03/31/25  0744 03/31/25  0731   NA  --   --   --   --   --  136  --  131*  --  135  --  136   POTASSIUM  --   --   --   --   --  3.9  --  4.2  --  4.4  --  3.7   CHLORIDE  --   --   --   --   --  96*  --  92*  --  95*  --  96*   JODY  --   --   --   --   --  10.1  --  10.3  --  10.1  --  10.2   CO2  --   --   --   --   --  29  --  29  --  27  --  27   BUN  --   --   --   --   --  27.3*  --  56.8*  --  55.4*  --  81.4*   CR  --   --   --   --   --  2.51*  --  3.83*  --  3.95*  --  5.00*   * 225* 160* 159*   < > 178*   < > 214*   < > 258*  257*   < > 133*    < > = values in this interval not displayed.     [email or web address](phos:4)  CBC)  Recent Labs   Lab 04/05/25  0607 04/04/25  0629 04/02/25  0759 03/31/25  0731   WBC 5.7 8.6 8.3 7.9   HGB 8.1* 7.8* 7.9* 8.3*   HCT 27.0* 25.3* 24.7* 25.8*   MCV 97 95 94  94    191 187 209       Recent Labs   Lab 03/30/25  2222   INR 1.47*         Attestation:   I have reviewed today's relevant vital signs, notes, medications, labs and imaging.

## 2025-04-06 NOTE — CODE/RAPID RESPONSE
[facility]    RRT Note  4/6/2025   Time Called: 0532    RRT called for: worsening SOB, with increased work of breathing    Assessment & Plan   Supriya Herr is a 75 year old female with very complex PMH including end-stage renal disease on hemodialysis, HFpEF, paroxysmal atrial fibrillation, hypertension, hyperlipidemia, anxiety, depression, chronic anemia, dysphagia status post PEG tube, type 2 diabetes, left traumatic AKA and recent hospitalization with multifactorial respiratory failure now being admitted on 3/27/2025 with abdominal pain and retching. Rapid response called in the setting of worsening shortness of breath.    Acute hypoxemic and hypercarbic respiratory failure 2/2 multifactorial  Assessment: increased work of breathing, shortness of breath,oxygen saturation in lower 80s despite oxygen support    INTERVENTIONS:  -VBG  -CXR  -Will pur her on Bipap     At the end of the RRT pt is in the process of getting transferred to     Discussed with bedside nurse and defer further cares to day rounding provider    Interval History     Supriya Herr is a 75 year old female who was admitted on 3/27/2025 for multifactorial respiratory failure, rapid response called in the setting of worsening shortness of breath and increased work of breathing.   Medical history significant for: end-stage renal disease on hemodialysis, HFpEF, paroxysmal atrial fibrillation, hypertension, hyperlipidemia, anxiety, depression, chronic anemia, dysphagia status post PEG tube, type 2 diabetes, left traumatic AKA     Code Status: No CPR- Pre-arrest intubation OK    Allergies   Allergies   Allergen Reactions    Ampicillin-Sulbactam Sodium Rash     No evidence SJS, but very uncomfortable and precipitated multiple provider visits. Would not use penicillins again if other options available.     Penicillins Rash       Physical Exam   Vital Signs with Ranges:  Temp:  [98.2  F (36.8  C)-98.6  F (37  C)] 98.6  F  (37  C)  Pulse:  [71-81] 81  Resp:  [18-20] 18  BP: (153-176)/(45-60) 176/60  SpO2:  [93 %-100 %] 100 %  I/O last 3 completed shifts:  In: 1007 [P.O.:120; NG/GT:887]  Out: -     Constitutional: in distress  Pulmonary: decreased air entry to basal third of the lung fields  Cardiovascular: S1 &S2 well heard, no M or G  GI: non tender, non distended  Skin/Integumen: no rash  Neuro: A &Ox3  Extremities: no edema    Data       ABG:  -  Recent Labs   Lab 04/05/25  1817   O2PER 25       Troponin:    No lab results found.    IMAGING: (X-ray/CT/MRI)   Recent Results (from the past 24 hours)   XR Chest Port 1 View    Narrative    EXAM: XR CHEST PORT 1 VIEW  LOCATION: [facility]  DATE: 4/5/2025    INDICATION: Cough, shortness of breath  COMPARISON: 3/9/2025 radiograph. 3/27/2025 CT.      Impression    IMPRESSION: No pneumothorax. A left basilar opacity likely represents atelectasis. Underlying airspace disease is also possible. There is interstitial edema. The cardiomediastinal silhouette is at the upper limits of normal in size. Mitral annular   calcification is present. There is a left axillary vascular stent.       CBC with Diff:  Recent Labs   Lab Test 04/05/25  0607 03/31/25  0731 03/30/25  2222   WBC 5.7   < >  --    HGB 8.1*   < > 7.6*   MCV 97   < >  --       < >  --    INR  --   --  1.47*    < > = values in this interval not displayed.        Lactic Acid:    Lab Results   Component Value Date    LACT 0.5 03/28/2025    LACT <0.3 03/05/2025    LACT 0.5 02/06/2025    LACT 0.6 05/19/2018           Comprehensive Metabolic Panel:  Recent Labs   Lab 04/06/25  0134 04/05/25  1137 04/05/25  0607 03/31/25  0744 03/31/25  0731   NA  --   --  136   < > 136   POTASSIUM  --   --  3.9   < > 3.7   CHLORIDE  --   --  96*   < > 96*   CO2  --   --  29   < > 27   ANIONGAP  --   --  11   < > 13   *   < > 178*   < > 133*   BUN  --   --  27.3*   < > 81.4*   CR  --   --  2.51*   < > 5.00*  "  GFRESTIMATED  --   --  19*   < > 8*   JODY  --   --  10.1   < > 10.2   MAG  --   --   --   --  2.3   PHOS  --   --   --   --  3.7    < > = values in this interval not displayed.       INR:    Recent Labs   Lab Test 03/30/25  2222   INR 1.47*       D-DIMER:  No results found for: \"DIMER\"    BNP:  No results found for: \"BNP\"    UA:  No results for input(s): \"COLOR\", \"APPEARANCE\", \"URINEGLC\", \"URINEBILI\", \"URINEKETONE\", \"SG\", \"UBLD\", \"URINEPH\", \"PROTEIN\", \"UROBILINOGEN\", \"NITRITE\", \"LEUKEST\", \"RBCU\", \"WBCU\" in the last 168 hours.        Time Spent on this Encounter   I spent 36 minutes of critical care time on the unit/floor managing the care of Supriya Herr. Upon evaluation, this patient had a high probability of imminent or life-threatening deterioration due to worsening respiratory failure, which required my direct attention, intervention, and personal management. 100% of my time was spent at the bedside counseling the patient and/or coordinating care regarding services listed in this note.    Dilan Torres MD     This note is created using voice recognition software.  As a result, there might be error that have gone undetected.  Please consider this when interpreting information found in this note.     "

## 2025-04-07 LAB
ALBUMIN SERPL BCG-MCNC: 3.1 G/DL (ref 3.5–5.2)
ANION GAP SERPL CALCULATED.3IONS-SCNC: 11 MMOL/L (ref 7–15)
BASE EXCESS BLDV CALC-SCNC: 9.1 MMOL/L (ref -3–3)
BUN SERPL-MCNC: 77 MG/DL (ref 8–23)
CALCIUM SERPL-MCNC: 9.9 MG/DL (ref 8.8–10.4)
CHLORIDE SERPL-SCNC: 94 MMOL/L (ref 98–107)
CREAT SERPL-MCNC: 4.92 MG/DL (ref 0.51–0.95)
EGFRCR SERPLBLD CKD-EPI 2021: 9 ML/MIN/1.73M2
GLUCOSE BLDC GLUCOMTR-MCNC: 102 MG/DL (ref 70–99)
GLUCOSE BLDC GLUCOMTR-MCNC: 175 MG/DL (ref 70–99)
GLUCOSE BLDC GLUCOMTR-MCNC: 186 MG/DL (ref 70–99)
GLUCOSE BLDC GLUCOMTR-MCNC: 192 MG/DL (ref 70–99)
GLUCOSE BLDC GLUCOMTR-MCNC: 73 MG/DL (ref 70–99)
GLUCOSE SERPL-MCNC: 229 MG/DL (ref 70–99)
HCO3 BLDV-SCNC: 35 MMOL/L (ref 21–28)
HCO3 SERPL-SCNC: 29 MMOL/L (ref 22–29)
HGB BLD-MCNC: 7.5 G/DL (ref 11.7–15.7)
MAGNESIUM SERPL-MCNC: 2.4 MG/DL (ref 1.7–2.3)
O2/TOTAL GAS SETTING VFR VENT: 4 %
OXYHGB MFR BLDV: 75 % (ref 70–75)
PCO2 BLDV: 55 MM HG (ref 40–50)
PH BLDV: 7.41 [PH] (ref 7.32–7.43)
PHOSPHATE SERPL-MCNC: 4.9 MG/DL (ref 2.5–4.5)
PO2 BLDV: 42 MM HG (ref 25–47)
POTASSIUM SERPL-SCNC: 4.7 MMOL/L (ref 3.4–5.3)
SAO2 % BLDV: 76.8 % (ref 70–75)
SODIUM SERPL-SCNC: 134 MMOL/L (ref 135–145)
VANCOMYCIN SERPL-MCNC: 25 UG/ML

## 2025-04-07 PROCEDURE — 120N000013 HC R&B IMCU

## 2025-04-07 PROCEDURE — 36415 COLL VENOUS BLD VENIPUNCTURE: CPT | Performed by: HOSPITALIST

## 2025-04-07 PROCEDURE — 94660 CPAP INITIATION&MGMT: CPT

## 2025-04-07 PROCEDURE — 250N000009 HC RX 250: Performed by: STUDENT IN AN ORGANIZED HEALTH CARE EDUCATION/TRAINING PROGRAM

## 2025-04-07 PROCEDURE — 250N000013 HC RX MED GY IP 250 OP 250 PS 637: Performed by: STUDENT IN AN ORGANIZED HEALTH CARE EDUCATION/TRAINING PROGRAM

## 2025-04-07 PROCEDURE — 99232 SBSQ HOSP IP/OBS MODERATE 35: CPT | Performed by: HOSPITALIST

## 2025-04-07 PROCEDURE — 250N000011 HC RX IP 250 OP 636: Performed by: INTERNAL MEDICINE

## 2025-04-07 PROCEDURE — 85018 HEMOGLOBIN: CPT | Performed by: INTERNAL MEDICINE

## 2025-04-07 PROCEDURE — 36415 COLL VENOUS BLD VENIPUNCTURE: CPT | Performed by: INTERNAL MEDICINE

## 2025-04-07 PROCEDURE — 250N000013 HC RX MED GY IP 250 OP 250 PS 637: Performed by: INTERNAL MEDICINE

## 2025-04-07 PROCEDURE — 94640 AIRWAY INHALATION TREATMENT: CPT

## 2025-04-07 PROCEDURE — 80069 RENAL FUNCTION PANEL: CPT | Performed by: INTERNAL MEDICINE

## 2025-04-07 PROCEDURE — 90937 HEMODIALYSIS REPEATED EVAL: CPT

## 2025-04-07 PROCEDURE — 90935 HEMODIALYSIS ONE EVALUATION: CPT | Performed by: INTERNAL MEDICINE

## 2025-04-07 PROCEDURE — 82805 BLOOD GASES W/O2 SATURATION: CPT | Performed by: HOSPITALIST

## 2025-04-07 PROCEDURE — 999N000157 HC STATISTIC RCP TIME EA 10 MIN

## 2025-04-07 PROCEDURE — 634N000001 HC RX 634: Performed by: INTERNAL MEDICINE

## 2025-04-07 PROCEDURE — 999N000128 HC STATISTIC PERIPHERAL IV START W/O US GUIDANCE

## 2025-04-07 PROCEDURE — 80202 ASSAY OF VANCOMYCIN: CPT | Performed by: STUDENT IN AN ORGANIZED HEALTH CARE EDUCATION/TRAINING PROGRAM

## 2025-04-07 PROCEDURE — 250N000013 HC RX MED GY IP 250 OP 250 PS 637: Performed by: HOSPITALIST

## 2025-04-07 PROCEDURE — 83735 ASSAY OF MAGNESIUM: CPT | Performed by: STUDENT IN AN ORGANIZED HEALTH CARE EDUCATION/TRAINING PROGRAM

## 2025-04-07 PROCEDURE — 250N000011 HC RX IP 250 OP 636

## 2025-04-07 RX ORDER — VANCOMYCIN HYDROCHLORIDE 1 G/200ML
1000 INJECTION, SOLUTION INTRAVENOUS ONCE
Status: COMPLETED | OUTPATIENT
Start: 2025-04-07 | End: 2025-04-07

## 2025-04-07 RX ORDER — HEPARIN SODIUM 1000 [USP'U]/ML
500 INJECTION, SOLUTION INTRAVENOUS; SUBCUTANEOUS CONTINUOUS
Status: DISCONTINUED | OUTPATIENT
Start: 2025-04-07 | End: 2025-04-09

## 2025-04-07 RX ADMIN — GUAIFENESIN AND DEXTROMETHORPHAN HYDROBROMIDE 1 TABLET: 600; 30 TABLET, EXTENDED RELEASE ORAL at 13:31

## 2025-04-07 RX ADMIN — INSULIN GLARGINE 12 UNITS: 100 INJECTION, SOLUTION SUBCUTANEOUS at 09:09

## 2025-04-07 RX ADMIN — HYDRALAZINE HYDROCHLORIDE 75 MG: 25 TABLET ORAL at 06:38

## 2025-04-07 RX ADMIN — ATORVASTATIN CALCIUM 20 MG: 20 TABLET, FILM COATED ORAL at 22:00

## 2025-04-07 RX ADMIN — INSULIN GLARGINE 12 UNITS: 100 INJECTION, SOLUTION SUBCUTANEOUS at 22:26

## 2025-04-07 RX ADMIN — CALCITRIOL CAPSULES 0.25 MCG 0.25 MCG: 0.25 CAPSULE ORAL at 13:30

## 2025-04-07 RX ADMIN — IPRATROPIUM BROMIDE AND ALBUTEROL SULFATE 3 ML: .5; 3 SOLUTION RESPIRATORY (INHALATION) at 19:43

## 2025-04-07 RX ADMIN — Medication 50 MCG: at 13:30

## 2025-04-07 RX ADMIN — SEVELAMER CARBONATE 1600 MG: 800 TABLET, FILM COATED ORAL at 18:16

## 2025-04-07 RX ADMIN — AMIODARONE HYDROCHLORIDE 200 MG: 200 TABLET ORAL at 13:31

## 2025-04-07 RX ADMIN — HYDRALAZINE HYDROCHLORIDE 75 MG: 25 TABLET ORAL at 14:48

## 2025-04-07 RX ADMIN — HEPARIN SODIUM 500 UNITS/HR: 1000 INJECTION, SOLUTION INTRAVENOUS; SUBCUTANEOUS at 08:30

## 2025-04-07 RX ADMIN — Medication 40 MG: at 13:30

## 2025-04-07 RX ADMIN — VANCOMYCIN HYDROCHLORIDE 1000 MG: 1 INJECTION, SOLUTION INTRAVENOUS at 17:14

## 2025-04-07 RX ADMIN — MIDODRINE HYDROCHLORIDE 5 MG: 5 TABLET ORAL at 07:43

## 2025-04-07 RX ADMIN — HYDRALAZINE HYDROCHLORIDE 75 MG: 25 TABLET ORAL at 21:59

## 2025-04-07 RX ADMIN — SENNOSIDES 2 TABLET: 8.6 TABLET ORAL at 22:00

## 2025-04-07 RX ADMIN — AMIODARONE HYDROCHLORIDE 200 MG: 200 TABLET ORAL at 21:59

## 2025-04-07 RX ADMIN — CETIRIZINE HYDROCHLORIDE 10 MG: 10 TABLET, FILM COATED ORAL at 22:01

## 2025-04-07 RX ADMIN — HEPARIN SODIUM 500 UNITS: 1000 INJECTION, SOLUTION INTRAVENOUS; SUBCUTANEOUS at 08:50

## 2025-04-07 RX ADMIN — EPOETIN ALFA-EPBX 5000 UNITS: 4000 INJECTION, SOLUTION INTRAVENOUS; SUBCUTANEOUS at 10:00

## 2025-04-07 RX ADMIN — APIXABAN 5 MG: 5 TABLET, FILM COATED ORAL at 13:31

## 2025-04-07 RX ADMIN — SERTRALINE HYDROCHLORIDE 75 MG: 50 TABLET ORAL at 13:30

## 2025-04-07 RX ADMIN — POLYETHYLENE GLYCOL 3350 17 G: 17 POWDER, FOR SOLUTION ORAL at 22:29

## 2025-04-07 RX ADMIN — APIXABAN 5 MG: 5 TABLET, FILM COATED ORAL at 22:00

## 2025-04-07 RX ADMIN — INSULIN ASPART 1 UNITS: 100 INJECTION, SOLUTION INTRAVENOUS; SUBCUTANEOUS at 09:09

## 2025-04-07 ASSESSMENT — ACTIVITIES OF DAILY LIVING (ADL)
ADLS_ACUITY_SCORE: 95
ADLS_ACUITY_SCORE: 99
ADLS_ACUITY_SCORE: 99
ADLS_ACUITY_SCORE: 95
ADLS_ACUITY_SCORE: 91
ADLS_ACUITY_SCORE: 95
ADLS_ACUITY_SCORE: 99
ADLS_ACUITY_SCORE: 95
ADLS_ACUITY_SCORE: 95
ADLS_ACUITY_SCORE: 91
ADLS_ACUITY_SCORE: 91
ADLS_ACUITY_SCORE: 95

## 2025-04-07 NOTE — PHARMACY-VANCOMYCIN DOSING SERVICE
Pharmacy Vancomycin Note  Date of Service 2025  Patient's  1950   75 year old, female    Indication: Bacteremia  Day of Therapy: 11  Current vancomycin regimen: Intermittent dosing based on Vanco level pre-HD.   Current vancomycin monitoring method: Renal Replacement Therapy  Current vancomycin therapeutic monitoring goal: 15-20 mg/L    Current estimated CrCl = Estimated Creatinine Clearance: 11.5 mL/min (A) (based on SCr of 4.92 mg/dL (H)).    Creatinine for last 3 days  2025:  6:07 AM Creatinine 2.51 mg/dL  2025:  6:41 AM Creatinine 4.92 mg/dL    Recent Vancomycin Levels (past 3 days)  2025:  6:41 AM Vancomycin 25.0 ug/mL    Vancomycin IV Administrations (past 72 hours)                     vancomycin (VANCOCIN) 1,500 mg in 0.9% NaCl 265 mL intermittent infusion (mg) 1,500 mg New Bag 25 1444                    Nephrotoxins and other renal medications (From now, onward)      Start     Dose/Rate Route Frequency Ordered Stop    25 1600  vancomycin (VANCOCIN) 1,000 mg in 200 mL dextrose intermittent infusion         1,000 mg  200 mL/hr over 1 Hours Intravenous ONCE 25 0851      25 1003  vancomycin place cardoso - receiving intermittent dosing         1 each Intravenous SEE ADMIN INSTRUCTIONS 25 1003                 Contrast Orders - past 72 hours (72h ago, onward)      None            Interpretation of levels and current regimen:  Vancomycin level is reflective of supratherapeutic level  Has serum creatinine changed greater than 50% in last 72 hours: ESRD on iHD  Urine output:  ESRD on iHD  Renal Function: ESRD on Dialysis      Plan:  Give 1000 mg IV Vancomycin after dialysis today  Vancomycin monitoring method: Renal Replacement Therapy  Vancomycin therapeutic monitoring goal: 15-20 mg/L  Pharmacy will check vancomycin levels as appropriate in  Recheck Vancomycin level prior to next hemodialysis.  .  Serum creatinine levels will be ordered  per Nephrology.   .    Yamilet Maravilla [facility] , PharmD, BCPS

## 2025-04-07 NOTE — PLAN OF CARE
"Patient Name: Geronimo  MRN: [id number]  Date of Admission: 3/27/2025  Reason for Admission: Generalized weakness / SOB  [place] [facility] [place]    Vitals:   BP Readings from Last 1 Encounters:   04/07/25 (!) 148/45     Pulse Readings from Last 1 Encounters:   04/07/25 77     Wt Readings from Last 1 Encounters:   04/02/25 98.1 kg (216 lb 4.3 oz)     Ht Readings from Last 1 Encounters:   03/27/25 1.676 m (5' 6\")     Estimated body mass index is 34.91 kg/m  as calculated from the following:    Height as of this encounter: 1.676 m (5' 6\").    Weight as of this encounter: 98.1 kg (216 lb 4.3 oz).  Temp Readings from Last 1 Encounters:   04/07/25 98.2  F (36.8  C) (Axillary)       Pain: Pain goal 0 Pain Rating 0 Effective pain medication/regimen rest/reposition      Assessment    Resp: LS coarse, on Bipap at 30% FiO2 overnight-tolerated  Telemetry: SR  Neuro: Alert, intermittent confusion, forgetful, easily reoriented and redirectable  GI/: G-tube in place, nocturnal tube feeds at 65 ml/hr-tolerated; oliguric; had small BM this AM 4/7  Skin/Wounds: L AVF (+bruit and thrill);  wound in coccyx, skin tear in R arm-covered with mepilex; L AKA   Lines/Drains: IVF was pulled out accidentally by pt., IV team consult placed  Activity: A2-CL  Sleep: intermittent, long intervals  Abnormal Labs: routine    Aggression Stop Light: Green          Patient Care Plan: for HD today, monitor respiratory status      "

## 2025-04-07 NOTE — PROGRESS NOTES
"[facility]    Medicine Progress Note - Hospitalist Service    Date of Admission:  3/27/2025    Assessment & Plan   Supriya Herr is a 75 year old female with very complex past medical history including end-stage renal disease on hemodialysis, HFpEF, paroxysmal atrial fibrillation, hypertension, hyperlipidemia, anxiety, depression, chronic anemia, dysphagia status post PEG tube, type 2 diabetes, left traumatic AKA and recent hospitalization with multifactorial respiratory failure now being admitted on 3/27/2025 with abdominal pain and retching. She is found to have e/o constipation. But other evaluation notable for elevated WBC, procal and CRP of unclear etiology.      Pf note, pt with multiple recent hospitalizations. Per last discharge summary:   \"Hospitalized at [facility] from 3/2 - 3/6/2025 with shortness of breath likely multifactorial.  Volume status was difficult to determine, underwent right heart cath on 3/5 showed mildly elevated right atrial and PCWP readings.  Was treated for acute respiratory failure likely from heart failure exacerbation, atrial fibrillation, end-stage renal disease and discharged to care facility with supplemental nocturnal oxygen.\"  \"Prolonged hospitalized at [facility] from 1/8 to 2/26/2025 for acute hypoxic respiratory failure multifactorial, was intubated ( 1/9-1/18), reintubated (1/20 - 1/25).  Then was treated for shock with pressors, influenza A pneumonia, hospital-acquired pneumonia with intravenous antibiotics, steroids, antivirals.  Then also noted to have heart failure exacerbation, recurrent A-fib with RVR, subsequent bradycardia.  Then also noted to have encephalopathy likely from infectious, metabolic etiology and delirium.  During that hospitalization was noted to have dysphagia, pharyngeal edema, was evaluated by ENT on 2/16.  No findings to explain dysphagia.  G-tube was placed by IR on 2/17 and was on nocturnal tube feeds.  Discharged to TCU for " "rehabilitation.\"     Dispo: Pt's daughter prefers her to return home rather than TCU. Pt will need a number of home medical supplies prior to being able to go home. These have been ordered in the discharge navigator. It may take some time for them to be delivered to her home. Appreciate social work and care coordinator help in arranging this. Pt's daughter will also need teaching on how to do tube feedings. IV antibiotics should be arranged through nephrology so she can get them with dialysis.      *4/6 - Patient has been medically ready for discharge but had sensation of SOB despite sats ok and RRT called early AM  - see their  note for details. CXR and blood gas fairly unremarkable. Transferred to [facility] and on bipap. Some improvement with atarax.   *4/7 - remains on NC / OM remains in place but O2 sats have been ok   - Continue close monitoring and regimen below       E. Faecalis bacteremia  Leukocytosis  Elevated procalcitonin   Elevated CRP  On admission, WBC elevated to 17.4, Procal elevated to 1.3, and CRP elevated to 39.78.   Unclear infectious source. She has some mild non-specific urinary bladder wall thickening, but with ESRD makes very little urine. Also has a new MIKAYLA 3mm nodule which may be infectious or inflammatory, but no respiratory complaints.   * COVID, flu RSV pending   *UA very abnormal but difficult to interpret in the setting of ESRD. Ultimately Ucx positive for E faecalis. Could be initial source or seeding from bacteremia.  *Bcx positive for E. Faecalis. Unclear source. Concern for endocarditis given how quickly blood cultures are coming back positive  *TTE without vegetation seen.  *TTE completed 4/1 without concern for mass or vegetation.   - ID consulted for assistance               - Continue IV vancomycin               - Repeat Bcx until cleared, no growth since 3/29               - Per ID, plan to complete 6 week course with IV vancomycin.   - IV antibiotics should be arranged through " nephrology/dialysis.      Abdominal pain  Retching, with Hx of GERD   Dysphagia s/p PEG tube (2/1/2025)   Constipation, rectal stool ball   Pt presents to the ED with vague complaints of retching/dry heaving without vomiting and some crampy lower abdominal pain.   * CT scan showed large rectal stool ball without evidence of obstruction. There was also some non-specific mild urinary bladder wall thickening. No other intra-abdominal pathology.   - Unclear exactly what is going on. Pt did not have any retching or other symptoms while I was in the room. Perhaps she has some reflux of tube feeds or other esophageal issue.  - GI consulted for assessment:               - EGD on 3/30 without etiology to explain dysphagia               - XR Esophagram normal  - Continue Protonix 40 mg daily  - Continue bowel regimen to work on the constipation   - Zofran available PRN  * Pt still having these bouts of retching/coughing/dry heaving. Very unclear why this is happening. I have not personally seen one of these episodes nor have any of the nurses so its hard to say exactly what is going on. Will continue to monitor. No additional work-up needed currently.      End-stage renal disease on HD   Dialyzes MWF via L arm fistula. Per prior notes, volume removal has often been limited by intra dialytic hypotension and a fib with RVR   *Noted that patient has been getting short of breath on Sundays in the past and there was discussion of adding a fourth dialysis day on Saturdays. As of now, nephrology challenging dry weight to see if that helps.  - Nephrology consulted   - Continue PTA midodrine MWF before dialysis   - Continue PTA calcitriol, renvela, vit D.      Concern of acute hypoxic respiratory failure though normal O2 sats and blood gas fairly bland  *CXRs 4/5 without new overt findings  - O2 as needed, wean as able, redirect and re-assure    Heart failure with preserved ejection fraction, chronic  Paroxysmal atrial fibrillation on  chronic anticoagulation  Hypertension  Hyperlipidemia  Troponin elevation, Chronic Non-ischemic Myocardial Injury due to chronic heart failure with preserved ejection fraction and end-stage renal disease (85->85), no treatment indicated   [PTA medications include: amiodarone 200mg BID, amlodipine 10mg daily, Apixaban 5mg BID, atorvastatin 20mg daily, hydralazine 75mg Q8H,   *TTE 1/21/2025 with LVEF of 60%.  No valvular abnormality   * RHC 3/5/25 showing moderately elevated right and left-sided filling pressure; elevated wedge pressure consistent with pulmonary hypertension  * On admission now, pt has markedly elevated BNP to 51,544 (though historically is in the 20,000-40,000 range), She does have small pleural effusions on CT, but no other overt evidence of volume overload or clinical HF. She is satting well on room air.  - I/Os and daily weights ordered  - continue PTA apixaban 5 mg BID  - continue PTA atorvastatin 20 mg daily  - continue PTA hydralazine 75 mg q8H  - continue PTA amiodarone 200 mg BID  - continue PTA amlodipine 10 m qday  - Volume management per nephrology    Vaginal bleeding   Pt has had intermittent vaginal bleeding this stay. Nursing and pt quite convinced it is in fact vaginal rather than rectal or urethral.   - transvaginal ultrasound ordered for further eval negative for any concerning findings.   - Recommend outpatient Gyn follow-up      Anxiety/depression/insomnia  - Continue PTA zoloft      Anemia of chronic disease  Hemoglobin 9.2 on admission, has trended down since admission to 7.8.   - Monitor CBC      Type II DM  - Continue PTA lantus 15 units BID   - MDSSI      Physical deconditioning from medical illness, senile frailty.  S/p left above-knee amputation, traumatic  Patient from TCU, daughter hopeful pt will be able to discharge back home with services rather than going back to TCU.   - PT/OT consulted   - Will plan for home discharge, pt will need      Sacrococcygeal Pressure  injury, Stage 3, present on admission  Wound care team followed.  Pressure injury prevention.      Obesity with a BMI of 34.  Increase all-cause mortality and morbidity.     Documentation for necessity of medical equipment      Patient needs a Semi Electric bed   Patient has Sacrococcygeal Pressure injury, Stage 3, present on admission and requires positioning of the body to alleviate pain, that cannot be accommodated in an ordinary bed (e.g. pain scale).  Patient also requires protection from further skin break down that cannot be accommodated in an ordinary bed. She also has chronic G-tube feeding due to dysphagia a condition that requires the head of the bed to be elevated more than 30 degrees most of the time,  where pillows or wedges does not meet the patient s needs       Documentation of need for Rene Lift   The patient require the assistance of two persons in order to transfer between wheelchair, bed, commode. Without the use of the Rene lift, the patient will be bed confined.   The patient is unable to be transferred without a Rene lift due to  severe weakness, and lower extremity amputation. One caregiver cannot physically transfer pt. Yes, the lift will fit into all necessary areas in the patient s home            Diet: Adult Formula Drip Feeding: [employer] Renal Support; Gastrostomy; Goal Rate: 65; mL/hr; From: 6:00 PM; To: 6:00 AM; 3/31: Decrease cycle time from 14 hrs to 12 hrs (6:00 pm to 6:00 am)  Renal Diet (dialysis)    DVT Prophylaxis: DOAC  Bradshaw Catheter: Not present  Lines: None     Cardiac Monitoring: ACTIVE order. Indication: Procedural area  Code Status: No CPR- Pre-arrest intubation OK      Clinically Significant Risk Factors         # Hyponatremia: Lowest Na = 134 mmol/L in last 2 days, will monitor as appropriate  # Hypochloremia: Lowest Cl = 94 mmol/L in last 2 days, will monitor as appropriate   # Hypercalcemia: corrected calcium is >10.1, will monitor as appropriate    #  "Hypoalbuminemia: Lowest albumin = 3.1 g/dL at 4/7/2025  6:41 AM, will monitor as appropriate     # Hypertension: Noted on problem list  # Chronic heart failure with preserved ejection fraction: heart failure noted on problem list and last echo with EF >50%          # DMII: A1C = 6.5 % (Ref range: <5.7 %) within past 6 months   # Obesity: Estimated body mass index is 34.91 kg/m  as calculated from the following:    Height as of this encounter: 1.676 m (5' 6\").    Weight as of this encounter: 98.1 kg (216 lb 4.3 oz).      # Financial/Environmental Concerns:           Social Drivers of Health    Tobacco Use: Medium Risk (3/30/2025)    Patient History     Smoking Tobacco Use: Former     Smokeless Tobacco Use: Never   Physical Activity: Insufficiently Active (7/19/2024)    Exercise Vital Sign     Days of Exercise per Week: 1 day     Minutes of Exercise per Session: 10 min   Social Connections: Unknown (7/19/2024)    Social Connection and Isolation Panel [NHANES]     Frequency of Social Gatherings with Friends and Family: More than three times a week          Disposition Plan     Medically Ready for Discharge: Anticipated Tomorrow             Blaise Holden MD  Hospitalist Service  [facility]  Securely message with [employer] (more info)  Text page via [employer] Paging/Directory   ______________________________________________________________________    Interval History   Seen and examined in HD. No new complaints, denies change in breathing. Reviewed that recent xr did not show new infiltrate etc. No new fevers or chills. Discussed with nephro.    Physical Exam   Vital Signs: Temp: 98.1  F (36.7  C) Temp src: Axillary BP: (!) 166/51 Pulse: 74   Resp: 16 SpO2: 96 % O2 Device: Oxymask Oxygen Delivery: 4 LPM  Weight: 216 lbs 4.34 oz      Gen: NAD, pleasant  HEENT: EOMI, MMM  Resp: no focal crackles, no wheezes, no increased work of resp  CV: S1S2 heard, reg rhythm, reg rate  Abdo: soft, nontender, " nondistended, bowel sounds present  Ext: calves nontender, well perfused  Neuro: aa, conversant, moving ext, CN grossly intact, no facial asymmetry      Medical Decision Making       41 MINUTES SPENT BY ME on the date of service doing chart review, history, exam, documentation & further activities per the note.      Data     I have personally reviewed the following data over the past 24 hrs:    N/A  \   7.5 (L)   / N/A     134 (L) 94 (L) 77.0 (H) /  186 (H)   4.7 29 4.92 (H) \     ALT: N/A AST: N/A AP: N/A TBILI: N/A   ALB: 3.1 (L) TOT PROTEIN: N/A LIPASE: N/A

## 2025-04-07 NOTE — PLAN OF CARE
Orientations: AOX2-3. Disoriented to place and time. Intermittently anxious and forgetful  Vitals/Pain: VSS on 2-3 liters O2 via oxymask. BIPAP at night. LS coarse.  Tele: SR   Lines/Drains: PIV x1. Saline locked. Left arm fistula thrill and bruit present. G-tube present with nocturnal tube feeds. 6pm-6am.  Skin/Wounds: coccyx wound care complete and right arm skin tear covered with mepilex. Scab on left AKA site.  GI/: oliguric no urine output today, incontinent of stool. 2 BM's soft and formed.  Labs: Abnormal/Trends, Electrolyte Replacement- Mag 2.4 phos 4.9 Cr. 4.92 before dialysis today.  Ambulation/Assist: AX2 and lift turn and repo Q2 HRS.  Sleep Quality: Good  Plan: plan to discharge home with home care when medically ready. Promote regular sleep wake cycle. Daughter at bedside very supportive. Dialysis MWF.

## 2025-04-07 NOTE — PROGRESS NOTES
"Seen on dialysis.    Blood pressures are in the 140's. She continues to complain of SOB although on face mask O2 and sats are fine. Has been very anxious since arrival at dialysis. Upper arm LAVF working well.     Vital signs:  Temp: 97.9  F (36.6  C) Temp src: Axillary BP: (!) 141/48 Pulse: 73   Resp: 17 SpO2: 96 % O2 Device: Oxymask Oxygen Delivery: 4 LPM Height: 167.6 cm (5' 6\") Weight: 98.1 kg (216 lb 4.3 oz)  Estimated body mass index is 34.91 kg/m  as calculated from the following:    Height as of this encounter: 1.676 m (5' 6\").    Weight as of this encounter: 98.1 kg (216 lb 4.3 oz).  Gen anxious. Calling out. But alert  Lungs: Clear anterior  Ext: L amputation, R LE trace to no edema  Access: L upper arm fistula. L hand is warm. Color normal    Last Comprehensive Metabolic Panel:  Lab Results   Component Value Date     (L) 04/07/2025    POTASSIUM 4.7 04/07/2025    CHLORIDE 94 (L) 04/07/2025    CO2 29 04/07/2025    ANIONGAP 11 04/07/2025     (H) 04/07/2025    BUN 77.0 (H) 04/07/2025    CR 4.92 (H) 04/07/2025    GFRESTIMATED 9 (L) 04/07/2025    JODY 9.9 04/07/2025       A/P:    ESKD: dialysis MWF.     2. Volume: No overt volume to pull. We are aiming for 2.5 kg off today as tolerated. Blood pressures currently ok although historically can drop pressures dramatically during her run. Will pull as able    3. Anemia: Hgb is 7.5. Has been typically in the 7-8.5 range. Get 5,000 EPO with dialysis    4. BMP: Calcium is normal. Phos is controlled    5. Discharge: Have followed her previously and has routinely failed discharge. Most typically returns with SOB and not always only after a two day span from dialysis. Not clear that any additional run would be beneficial. Volume does not clearly correlate with her symptoms. She is difficult to occasionally get through her dialysis run because she is so agitated and anxious. This portends failure in an outpt unit where there are only techs and more patients per " staff member. Oxygen appears to help alleviate some of her symptoms of SOB even when her O2 saturations are adequate.    Alysia Roman MD MS

## 2025-04-07 NOTE — PROGRESS NOTES
Potassium   Date Value Ref Range Status   04/07/2025 4.7 3.4 - 5.3 mmol/L Final   02/02/2022 4.7 3.4 - 5.3 mmol/L Final   04/17/2021 4.2 3.4 - 5.3 mmol/L Final     Hemoglobin   Date Value Ref Range Status   04/07/2025 7.5 (L) 11.7 - 15.7 g/dL Final   04/16/2021 10.9 (L) 11.7 - 15.7 g/dL Final     Creatinine   Date Value Ref Range Status   04/07/2025 4.92 (H) 0.51 - 0.95 mg/dL Final   04/17/2021 5.98 (H) 0.52 - 1.04 mg/dL Final     Urea Nitrogen   Date Value Ref Range Status   04/07/2025 77.0 (H) 8.0 - 23.0 mg/dL Final   11/24/2021 37 (H) 7 - 30 mg/dL Final   04/17/2021 68 (H) 7 - 30 mg/dL Final     Sodium   Date Value Ref Range Status   04/07/2025 134 (L) 135 - 145 mmol/L Final   04/17/2021 137 133 - 144 mmol/L Final     INR   Date Value Ref Range Status   03/30/2025 1.47 (H) 0.85 - 1.15 Final   04/16/2021 1.14 0.86 - 1.14 Final       DIALYSIS PROCEDURE NOTE  Hepatitis status of previous patient on machine log was checked and verified ok to use with this patients hepatitis status.  Patient dialyzed for 3.5 hrs. on a K2 bath with a net fluid removal of  2.4L.  A BFR of 450 ml/min was obtained via a LUE AVF using 15 gauge needles.      The treatment plan was discussed with Dr. Roman during the treatment.    Total heparin received during the treatment: 1500 units.   Needle cannulation sites held x 10 min.     Meds  given: Retacrit, see MAR     Complications: Patient was very anxious on run, HD was completed as ordered, PT needed to be reassured every 5 min that she was going to be ok, and that her O2 SATs were fine.      ICEBOAT? Timeout performed pre-treatment  I: Patient was identified using 2 identifiers  C:  Consent Signed Yes  E: Equipment preventative maintenance is current and dialysis delivery system OK to use  B: Hepatitis B Surface Antigen: negative; Draw Date: 4/4/25      Hepatitis B Surface Antibody: susceptible; Draw Date: 4/4/25  O: Dialysis orders present and complete prior to treatment  A: Vascular  access verified and assessed prior to treatment  T: Treatment was performed at a clinically appropriate time  ?: Patient was allowed to ask questions and address concerns prior to treatment  See Adult Hemodialysis flowsheet in EPIC for further details and post assessment.  Machine water alarm in place and functioning. Transducer pods intact and checked every 15min.   Pt returned via Bed.  Chlorine/Chloramine water system checked every 4 hours.    Patient repositioned every 2 hours during the treatment.  Post treatment report given to JACQUI Palafox RN regarding 2.4L of fluid removed andlast BP of 111/51 .

## 2025-04-07 NOTE — LETTER
5/24/2019       RE: Supriya Herr  [address]  [facility] [address]     Dear Colleague,    Thank you for referring your patient, Supriya Herr, to the [facility] NEPHROLOGY at [facility]. Please see a copy of my visit note below.    Nephrology Clinic Visit 5/24/19    Assessment and Plan:     1. CKD5 w/proteinuria-  Renal function is slowly declining. Creat 4.1, eGFR 10 ml/mn, UPCR 6.28 g/gCr. No uremic symptoms   - Etiology of CKD felt to be DM, HTN, Vascular dz,    - Inactive on transplant wait list   - Will need AVG when time comes to initiate HD. New vein mapping today confirms this plan. Dr Martin would not have time available until sometime in July.    - Had long talk with patient and daughter Caroline. Caroline was very tearful and frightened. She felt like she had done something wrong to worsen her mother's kidney function. She is fearful that starting dialysis will antonio the beginning of the end for her mother. We discussed the wisdom of careful planning for Supriya's transition to dialysis to hopefully prevent and emergent situation. After tears were shed by all, patient and daughter agreed to initiation likely by the end of summer.    - No need to initiate at this time, no uremic symptoms, lytes, bicarb fine   - Does not use NSAIDs   - Blood pressures uncontrolled.   - DM with excellent control. A1c 6.0 % 4/19   - On statin for CV risk reduction     2. Volume status - Euvolemic. No edema. Blood pressures  uncontrolled. No current weight given amputation.   Remains on Lasix 60 mg am, 40 mg pm     3. HTN -  Uncontrolled. Clinic blood pressures 180-190/ in setting of pain/. Outside blood pressures in the 150-170/ range.   Current regimen:    - lasix 60/40   - Coreg 12.5 mg bid   - Amlodipine 5 mg every day       - Will increase Amlodipine to 5 mg bid      - Did not increase Coreg due to HR in the 60's    - Continue home monitoring   - Blood pressure goal  < 130/80     4. Electrolytes - No acute concerns. K 4.7, Na 144     5. Acid base - No acute concerns. Bicarb 23     6. BMD - Ca corrected 10.6, Phos 5.9, Albumin 3.0   - Vit D 27 (10/18)   -  ()   - Hold Vit D and Calcitriol      7. Anemia - Hgb 8.2 ()   - Iron studies : Ferritin 298, Fe 34, IS 15.    - Received Injectafer  and    - Has not required KELLIE given baseline creat ~ 10   - Colonoscopy , tubular adenomas     8. DM2 - Well controlled. A1c 6.0 %. On Insulin      9. Depression - Controlled with increase in Zoloft to 50 mg every day.       10. Disposition - RTC  1 month for f/u     Assessment and plan was discussed with patient and she voiced her understanding and agreement.     Reason for Visit:  CKD5/HTN f/u     HPI:  Ms Herr is a 68 yo female with CKD5 and nephrotic range proteinuria, DM2, HTN, in today for routine CKD f/u. Last seen by Dr Basilio 19. Baseline creat in the 3-4 range since      Interval Hx:   No hospital admissions     ROS:   Blood pressures at day program have risen over the last several weeks into the 150-170/ range. No clear etiology. Patient taking her medications as prescribed, following all of her dietary restrictions.   Patient currently in quite a lot of pain after having to lay flat for her vein mapping.   No other specific concerns voiced by patient.   Has good appetite. Blood sugars generally in target. Denies dyspnea, chest pain, abdominal pain. No bowel or bladder concerns. Doesn't yet have prosthesis.      Chronic Health Problems:     CKD5  Proteinuria  AKA, left (10/18)  T2DM  Vit D def  HTN  HLD  Anxiety/depression  Vitiligo  Non proliferative diabetic retinopathy  BCC  JONE  Anemia  Prior tobacco abuse     Personal Hx:   Lives in lower level of [facility], daughter Caroline upper level. NS    Family Hx:   Family History   Problem Relation Age of Onset     Diabetes Mother      Hypertension Mother      Heart Disease Mother          of  congestive heart failure     Eye Disorder Mother      Arthritis Mother      Obesity Mother      Heart Disease Father          from CHF     Cerebrovascular Disease Father      Arthritis Father      Musculoskeletal Disorder Other         has MS     Thyroid Disease Other      Eye Disorder Other         cataracts     Cancer Other         throat/liver     Skin Cancer No family hx of      Melanoma No family hx of      Glaucoma No family hx of      Macular Degeneration No family hx of          Allergies:  Allergies   Allergen Reactions     Penicillins Rash     Unasyn Rash     No evidence SJS, but very uncomfortable and precipitated multiple provider visits. Would not use penicillins again if other options available.        Medications:  Current Outpatient Medications   Medication Sig     amLODIPine (NORVASC) 5 MG tablet Take 1 tablet (5 mg) by mouth 2 times daily     acetaminophen (TYLENOL) 325 MG tablet Take 2 tablets (650 mg) by mouth every 4 hours as needed for mild pain     albuterol (PROAIR HFA, PROVENTIL HFA, VENTOLIN HFA) 108 (90 BASE) MCG/ACT inhaler Inhale 2 puffs into the lungs every 6 hours as needed for shortness of breath / dyspnea or wheezing     atorvastatin (LIPITOR) 20 MG tablet TAKE 1 TABLET BY MOUTH ONCE DAILY     blood glucose (ONETOUCH ULTRA) test strip TEST YOUR BLOOD SUGAR 3-4 TIMES PER DAY.     calcitRIOL (ROCALTROL) 0.25 MCG capsule TAKE 1 CAPSULE (0.25 MCG) BY MOUTH DAILY     carvedilol (COREG) 12.5 MG tablet TAKE 1 TABLET (12.5 MG) BY MOUTH 2 TIMES DAILY (WITH MEALS)     clindamycin (CLINDAMAX) 1 % topical gel Apply topically 2 times daily     furosemide (LASIX) 40 MG tablet Take 1 tablet (40 mg) by mouth 2 times daily (Patient taking differently: Take 40 mg by mouth every evening )     furosemide (LASIX) 80 MG tablet Take 60 mg by mouth every morning     insulin aspart (NOVOLOG FLEXPEN) 100 UNIT/ML pen Inject 4 units SQ with breakfast, lunch and dinner.     insulin glargine (BASAGLAR  KWIKPEN) 100 UNIT/ML pen Inject 22 Units Subcutaneous At Bedtime Inject 22 U SubCut at HS     insulin pen needle (ULTICARE MINI) 31G X 6 MM Use daily or as directed.     order for DME Compression socks - knee  15-20 mmHg  Open toed.  Use daily.     order for DME Equipment being ordered: Mattress Overlay for Hospital Bed. Height 65. Weight 168 lbs.  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     order for DME 1 wheelchair     order for DME Equipment being ordered: TEDS stocking   Below the knee 15-20 mg  Dispense 2  Use daily     ORDER FOR DME Equipment being ordered: Compression stockings, 20-30 MMHG, knee high     sertraline (ZOLOFT) 50 MG tablet Take 1 tablet (50 mg) by mouth daily     triamcinolone (KENALOG) 0.1 % external cream Apply to sites of dermatitis- the abdomen, armpits, groin as needed.     vitamin D3 (CHOLECALCIFEROL) 2000 units tablet Take 1 tablet by mouth daily     No current facility-administered medications for this visit.       Vitals:  Vitals not recorded by [facility].   The 3 rooming blood pressures were in the 180-190/ range  Repeat manual blood pressure by me : 180/60    Exam:  GEN: Pleasant female in NAD. Daughter present .  CARDIAC: RRR  LUNGS: CTA  ABDOMEN: Soft NT  EXT: Left LORI, Right tr edema  NEURO: alert. Oriented. No asterixis    LABS:   CMP  Recent Labs   Lab Test 05/24/19  1410 04/24/19  1514 02/15/19  1459 01/18/19  1457    142 144 142   POTASSIUM 4.7 4.1 4.3 4.5   CHLORIDE 113* 110* 110* 108   CO2 23 23 28 28   ANIONGAP 8 9 6 6   GLC 66* 176* 126* 166*   BUN 98* 98* 75* 70*   CR 4.15* 4.52* 3.53* 3.68*   GFRESTIMATED 10* 9* 12* 12*   GFRESTBLACK 12* 11* 14* 14*   JODY 9.8  8.7 8.6 8.5     Recent Labs   Lab Test 07/31/18  1148 07/24/18  0900 07/17/18  0830 06/24/18  0623 06/23/18  2347 03/29/18  1410 11/13/17  0715   BILITOTAL  --   --   --  0.2 0.2 0.2 0.3   ALKPHOS  --   --   --  49 56 56 70   ALT  --   --   --  17 13 15 37   AST 24 24 12 16 12 14 30     CBC  Recent Labs   Lab Test 04/24/19  1514 02/15/19  1459 01/18/19  1457 12/07/18  1411  07/31/18  1148   HGB 8.2* 10.3* 10.2* 10.3*   < > 9.3*   WBC  --  5.5 5.4 5.7  --  5.1   RBC  --  3.40* 3.47* 3.47*  --  3.09*   HCT  --  31.6* 32.4* 32.3*  --  29.1*   MCV  --  93 93 93  --  94   MCH  --  30.3 29.4 29.7  --  30.1   MCHC  --  32.6 31.5 31.9  --  32.0   RDW  --  12.5 12.4 12.4  --  12.8   PLT  --  232 222 237  --  246    < > = values in this interval not displayed.     URINE STUDIES  Recent Labs   Lab Test 03/29/18  1448 01/25/18  0233 11/02/17  0602 10/26/16  1220  12/05/12  1541   COLOR Straw Light Yellow Light Yellow Yellow   < > Yellow   APPEARANCE Clear Clear Clear Slightly Cloudy   < > Clear   URINEGLC 50* Negative 300* >500*   < > 100*   URINEBILI Negative Negative Negative Negative   < > Negative   URINEKETONE Negative Negative Negative Negative   < > Negative   SG 1.008 1.007 1.010 1.014   < > 1.025   UBLD Negative Negative Negative Negative   < > Small*   URINEPH 5.0 6.5 7.5* 6.0   < > 6.0   PROTEIN >499* 100* >600* >500*   < > 100*   UROBILINOGEN  --   --   --   --   --  0.2   NITRITE Negative Negative Negative Negative   < > Negative   LEUKEST Negative Negative Small* Small*   < > Negative   RBCU 1 0 1 5*   < >  --    WBCU 2 1 44* 54*   < >  --     < > = values in this interval not displayed.     Recent Labs   Lab Test 05/24/19  1420 12/07/18  1417 11/01/18  1533 05/24/18  1441   UTPG 6.28* 5.60* 6.71* 5.97*     PTH  Recent Labs   Lab Test 01/18/19  1457 10/17/18  1413 05/24/18  1435   PTHI 126* 127* 55     IRON STUDIES  Recent Labs   Lab Test 04/24/19  1514 02/15/19  1459 12/07/18  1411   IRON 34* 61 42   * 249  240   [facility] 15 24 18   ELENA 298* 316* 348*               Again, thank you for allowing me to participate in the care of your patient.      Sincerely,    Silva Guerrero NP       Detail Level: Zone

## 2025-04-08 LAB
CRP SERPL-MCNC: 12.72 MG/L
ERYTHROCYTE [DISTWIDTH] IN BLOOD BY AUTOMATED COUNT: 15.5 % (ref 10–15)
GLUCOSE BLDC GLUCOMTR-MCNC: 103 MG/DL (ref 70–99)
GLUCOSE BLDC GLUCOMTR-MCNC: 124 MG/DL (ref 70–99)
GLUCOSE BLDC GLUCOMTR-MCNC: 136 MG/DL (ref 70–99)
GLUCOSE BLDC GLUCOMTR-MCNC: 149 MG/DL (ref 70–99)
GLUCOSE BLDC GLUCOMTR-MCNC: 191 MG/DL (ref 70–99)
HCT VFR BLD AUTO: 25.1 % (ref 35–47)
HGB BLD-MCNC: 7.7 G/DL (ref 11.7–15.7)
MCH RBC QN AUTO: 29.2 PG (ref 26.5–33)
MCHC RBC AUTO-ENTMCNC: 30.7 G/DL (ref 31.5–36.5)
MCV RBC AUTO: 95 FL (ref 78–100)
PLATELET # BLD AUTO: 199 10E3/UL (ref 150–450)
RBC # BLD AUTO: 2.64 10E6/UL (ref 3.8–5.2)
WBC # BLD AUTO: 7.1 10E3/UL (ref 4–11)

## 2025-04-08 PROCEDURE — 86140 C-REACTIVE PROTEIN: CPT | Performed by: HOSPITALIST

## 2025-04-08 PROCEDURE — 250N000011 HC RX IP 250 OP 636: Performed by: STUDENT IN AN ORGANIZED HEALTH CARE EDUCATION/TRAINING PROGRAM

## 2025-04-08 PROCEDURE — 99232 SBSQ HOSP IP/OBS MODERATE 35: CPT | Performed by: PHYSICIAN ASSISTANT

## 2025-04-08 PROCEDURE — 36415 COLL VENOUS BLD VENIPUNCTURE: CPT | Performed by: HOSPITALIST

## 2025-04-08 PROCEDURE — 999N000157 HC STATISTIC RCP TIME EA 10 MIN

## 2025-04-08 PROCEDURE — 99233 SBSQ HOSP IP/OBS HIGH 50: CPT | Performed by: HOSPITALIST

## 2025-04-08 PROCEDURE — 250N000013 HC RX MED GY IP 250 OP 250 PS 637: Performed by: STUDENT IN AN ORGANIZED HEALTH CARE EDUCATION/TRAINING PROGRAM

## 2025-04-08 PROCEDURE — 250N000013 HC RX MED GY IP 250 OP 250 PS 637: Performed by: HOSPITALIST

## 2025-04-08 PROCEDURE — 85027 COMPLETE CBC AUTOMATED: CPT | Performed by: HOSPITALIST

## 2025-04-08 PROCEDURE — 94660 CPAP INITIATION&MGMT: CPT

## 2025-04-08 PROCEDURE — 120N000013 HC R&B IMCU

## 2025-04-08 RX ORDER — HYDROXYZINE HYDROCHLORIDE 25 MG/1
25 TABLET, FILM COATED ORAL EVERY 6 HOURS PRN
Status: DISCONTINUED | OUTPATIENT
Start: 2025-04-08 | End: 2025-04-09

## 2025-04-08 RX ADMIN — SERTRALINE HYDROCHLORIDE 75 MG: 50 TABLET ORAL at 10:25

## 2025-04-08 RX ADMIN — AMIODARONE HYDROCHLORIDE 200 MG: 200 TABLET ORAL at 10:24

## 2025-04-08 RX ADMIN — Medication 40 MG: at 10:36

## 2025-04-08 RX ADMIN — HYDRALAZINE HYDROCHLORIDE 75 MG: 25 TABLET ORAL at 14:40

## 2025-04-08 RX ADMIN — HYDRALAZINE HYDROCHLORIDE 75 MG: 25 TABLET ORAL at 21:02

## 2025-04-08 RX ADMIN — AMLODIPINE BESYLATE 10 MG: 10 TABLET ORAL at 10:25

## 2025-04-08 RX ADMIN — APIXABAN 5 MG: 5 TABLET, FILM COATED ORAL at 10:25

## 2025-04-08 RX ADMIN — SENNOSIDES 2 TABLET: 8.6 TABLET ORAL at 21:02

## 2025-04-08 RX ADMIN — SENNOSIDES 2 TABLET: 8.6 TABLET ORAL at 10:36

## 2025-04-08 RX ADMIN — ATORVASTATIN CALCIUM 20 MG: 20 TABLET, FILM COATED ORAL at 21:03

## 2025-04-08 RX ADMIN — HYDROXYZINE HYDROCHLORIDE 25 MG: 25 TABLET, FILM COATED ORAL at 14:40

## 2025-04-08 RX ADMIN — INSULIN GLARGINE 12 UNITS: 100 INJECTION, SOLUTION SUBCUTANEOUS at 10:41

## 2025-04-08 RX ADMIN — SEVELAMER CARBONATE 1600 MG: 800 TABLET, FILM COATED ORAL at 18:35

## 2025-04-08 RX ADMIN — AMIODARONE HYDROCHLORIDE 200 MG: 200 TABLET ORAL at 21:02

## 2025-04-08 RX ADMIN — POLYETHYLENE GLYCOL 3350 17 G: 17 POWDER, FOR SOLUTION ORAL at 10:24

## 2025-04-08 RX ADMIN — Medication 50 MCG: at 10:24

## 2025-04-08 RX ADMIN — INSULIN GLARGINE 12 UNITS: 100 INJECTION, SOLUTION SUBCUTANEOUS at 22:17

## 2025-04-08 RX ADMIN — SENNOSIDES AND DOCUSATE SODIUM 2 TABLET: 50; 8.6 TABLET ORAL at 10:24

## 2025-04-08 RX ADMIN — ONDANSETRON 4 MG: 4 TABLET, ORALLY DISINTEGRATING ORAL at 10:23

## 2025-04-08 RX ADMIN — CETIRIZINE HYDROCHLORIDE 10 MG: 10 TABLET, FILM COATED ORAL at 21:03

## 2025-04-08 RX ADMIN — HYDRALAZINE HYDROCHLORIDE 75 MG: 25 TABLET ORAL at 06:05

## 2025-04-08 RX ADMIN — HYDROXYZINE HYDROCHLORIDE 25 MG: 25 TABLET, FILM COATED ORAL at 21:06

## 2025-04-08 RX ADMIN — APIXABAN 5 MG: 5 TABLET, FILM COATED ORAL at 21:05

## 2025-04-08 ASSESSMENT — ACTIVITIES OF DAILY LIVING (ADL)
ADLS_ACUITY_SCORE: 91
ADLS_ACUITY_SCORE: 91
ADLS_ACUITY_SCORE: 88
ADLS_ACUITY_SCORE: 88
ADLS_ACUITY_SCORE: 91
ADLS_ACUITY_SCORE: 88
ADLS_ACUITY_SCORE: 89
ADLS_ACUITY_SCORE: 89
ADLS_ACUITY_SCORE: 91
ADLS_ACUITY_SCORE: 88
ADLS_ACUITY_SCORE: 91
ADLS_ACUITY_SCORE: 92
ADLS_ACUITY_SCORE: 88
ADLS_ACUITY_SCORE: 88
ADLS_ACUITY_SCORE: 85
ADLS_ACUITY_SCORE: 88
ADLS_ACUITY_SCORE: 85
ADLS_ACUITY_SCORE: 88
ADLS_ACUITY_SCORE: 88
ADLS_ACUITY_SCORE: 89
ADLS_ACUITY_SCORE: 91

## 2025-04-08 NOTE — PROGRESS NOTES
"[facility]    Medicine Progress Note - Hospitalist Service    Date of Admission:  3/27/2025    Assessment & Plan   Supriya Herr is a 75 year old female with very complex past medical history including end-stage renal disease on hemodialysis, HFpEF, paroxysmal atrial fibrillation, hypertension, hyperlipidemia, anxiety, depression, chronic anemia, dysphagia status post PEG tube, type 2 diabetes, left traumatic AKA and recent hospitalization with multifactorial respiratory failure now being admitted on 3/27/2025 with abdominal pain and retching. She is found to have e/o constipation. But other evaluation notable for elevated WBC, procal and CRP of unclear etiology.      Pf note, pt with multiple recent hospitalizations. Per last discharge summary:   \"Hospitalized at [facility] from 3/2 - 3/6/2025 with shortness of breath likely multifactorial.  Volume status was difficult to determine, underwent right heart cath on 3/5 showed mildly elevated right atrial and PCWP readings.  Was treated for acute respiratory failure likely from heart failure exacerbation, atrial fibrillation, end-stage renal disease and discharged to care facility with supplemental nocturnal oxygen.\"  \"Prolonged hospitalized at [facility] from 1/8 to 2/26/2025 for acute hypoxic respiratory failure multifactorial, was intubated ( 1/9-1/18), reintubated (1/20 - 1/25).  Then was treated for shock with pressors, influenza A pneumonia, hospital-acquired pneumonia with intravenous antibiotics, steroids, antivirals.  Then also noted to have heart failure exacerbation, recurrent A-fib with RVR, subsequent bradycardia.  Then also noted to have encephalopathy likely from infectious, metabolic etiology and delirium.  During that hospitalization was noted to have dysphagia, pharyngeal edema, was evaluated by ENT on 2/16.  No findings to explain dysphagia.  G-tube was placed by IR on 2/17 and was on nocturnal tube feeds.  Discharged to TCU for " "rehabilitation.\"     Dispo: Pt's daughter prefers her to return home rather than TCU. Pt will need a number of home medical supplies prior to being able to go home. These have been ordered in the discharge navigator. It may take some time for them to be delivered to her home. Appreciate social work and care coordinator help in arranging this. Pt's daughter will also need teaching on how to do tube feedings. IV antibiotics should be arranged through nephrology so she can get them with dialysis.        *4/6 - 4/8 patient had/has been fairly optimized in general, however despite objectively having good O2 sats and CXR 4/6 unremarkable, feels SOB at times and has wanted mask or NC in place. Blood gas repeats compensated. Suspect some degree of anxiety. Updated dtr on phone.   *SW working on obtaining DME for home as patient and dtr hoping for home as above.   - Continue close monitoring and regimen below     Concern of acute hypoxic respiratory failure though mostly normal O2 sats and blood gas fairly bland  *CXRs 4/5 without new overt findings  - O2 as needed, wean as able, redirect and re-assure     E. Faecalis bacteremia  Leukocytosis  Elevated procalcitonin   Elevated CRP  *On admission, WBC elevated to 17.4, Procal elevated to 1.3, and CRP elevated to 39.78.   Unclear infectious source. She has some mild non-specific urinary bladder wall thickening, but with ESRD makes very little urine. Also has a new MIKAYLA 3mm nodule which may be infectious or inflammatory, but no respiratory complaints.   * COVID, flu RSV pending   *UA very abnormal but difficult to interpret in the setting of ESRD. Ultimately Ucx positive for E faecalis. Could be initial source or seeding from bacteremia.  *Bcx positive for E. Faecalis. Unclear source. Concern for endocarditis given how quickly blood cultures are coming back positive  *TTE without vegetation seen.  *TTE completed 4/1 without concern for mass or vegetation.   - ID consulted for " assistance               - Continue IV vancomycin               - Repeat Bcx until cleared, no growth since 3/29               - Per ID, plan to complete 6 week course with IV vancomycin - last dose 5/10  - IV antibiotics should be arranged through nephrology/dialysis.   - WBC remains normal and CRP improving thru 4/7    Abdominal pain - improved/resolved  Retching, with Hx of GERD   Dysphagia s/p PEG tube (2/1/2025)   Constipation, rectal stool ball   Pt presents to the ED with vague complaints of retching/dry heaving without vomiting and some crampy lower abdominal pain.   * CT scan showed large rectal stool ball without evidence of obstruction. There was also some non-specific mild urinary bladder wall thickening. No other intra-abdominal pathology.   - Unclear exactly what is going on. Pt did not have any retching or other symptoms while I was in the room. Perhaps she has some reflux of tube feeds or other esophageal issue.  - GI consulted for assessment:               - EGD on 3/30 without etiology to explain dysphagia               - XR Esophagram normal  - Continue Protonix 40 mg daily  - Continue bowel regimen to work on the constipation   - Zofran available PRN  * Prior to 4/6, pt still having these bouts of retching/coughing/dry heaving. Resolved lately?  - Will continue to monitor. No additional work-up needed currently.      End-stage renal disease on HD   Dialyzes MWF via L arm fistula. Per prior notes, volume removal has often been limited by intra dialytic hypotension and a fib with RVR   *Noted that patient has been getting short of breath on Sundays in the past and there was discussion of adding a fourth dialysis day on Saturdays. As of now, nephrology challenging dry weight to see if that helps.  - Nephrology consulted   - Continue PTA midodrine MWF before dialysis   - Continue PTA calcitriol, renvela, vit D.      Heart failure with preserved ejection fraction, chronic  Paroxysmal atrial  fibrillation on chronic anticoagulation  Hypertension  Hyperlipidemia  Troponin elevation, Chronic Non-ischemic Myocardial Injury due to chronic heart failure with preserved ejection fraction and end-stage renal disease (85->85), no treatment indicated   [PTA medications include: amiodarone 200mg BID, amlodipine 10mg daily, Apixaban 5mg BID, atorvastatin 20mg daily, hydralazine 75mg Q8H,   *TTE 1/21/2025 with LVEF of 60%.  No valvular abnormality   * RHC 3/5/25 showing moderately elevated right and left-sided filling pressure; elevated wedge pressure consistent with pulmonary hypertension  * On admission now, pt has markedly elevated BNP to 51,544 (though historically is in the 20,000-40,000 range), She does have small pleural effusions on CT, but no other overt evidence of volume overload or clinical HF. She is satting well on room air.  - I/Os and daily weights ordered  - continue PTA apixaban 5 mg BID  - continue PTA atorvastatin 20 mg daily  - continue PTA hydralazine 75 mg q8H  - continue PTA amiodarone 200 mg BID  - continue PTA amlodipine 10 m qday  - Volume management per nephrology     Vaginal bleeding   Pt has had intermittent vaginal bleeding this stay. Nursing and pt quite convinced it is in fact vaginal rather than rectal or urethral.   - transvaginal ultrasound ordered for further eval negative for any concerning findings.   - Recommend outpatient Gyn follow-up      Anxiety/depression/insomnia  - Continue PTA zoloft      Anemia of chronic disease  Hemoglobin 9.2 on admission, has trended down since admission to 7.8.   - Monitor CBC      Type II DM  - Continue PTA lantus 15 units BID   - MDSSI      Physical deconditioning from medical illness, senile frailty.  S/p left above-knee amputation, traumatic  Patient from TCU, daughter hopeful pt will be able to discharge back home with services rather than going back to TCU.   - PT/OT consulted   - Will plan for home discharge, pt will need       Sacrococcygeal Pressure injury, Stage 3, present on admission  Wound care team followed.  Pressure injury prevention.      Obesity with a BMI of 34.  Increase all-cause mortality and morbidity.     Documentation for necessity of medical equipment      Patient needs a Semi Electric bed   Patient has Sacrococcygeal Pressure injury, Stage 3, present on admission and requires positioning of the body to alleviate pain, that cannot be accommodated in an ordinary bed (e.g. pain scale).  Patient also requires protection from further skin break down that cannot be accommodated in an ordinary bed. She also has chronic G-tube feeding due to dysphagia a condition that requires the head of the bed to be elevated more than 30 degrees most of the time,  where pillows or wedges does not meet the patient s needs       Documentation of need for Rene Lift   The patient require the assistance of two persons in order to transfer between wheelchair, bed, commode. Without the use of the Rene lift, the patient will be bed confined.   The patient is unable to be transferred without a Rene lift due to  severe weakness, and lower extremity amputation. One caregiver cannot physically transfer pt. Yes, the lift will fit into all necessary areas in the patient s home            Diet: Adult Formula Drip Feeding: popexpert Renal Support; Gastrostomy; Goal Rate: 65; mL/hr; From: 6:00 PM; To: 6:00 AM; 3/31: Decrease cycle time from 14 hrs to 12 hrs (6:00 pm to 6:00 am)  Renal Diet (dialysis)    DVT Prophylaxis: DOAC  Bradshaw Catheter: Not present  Lines: None     Cardiac Monitoring: ACTIVE order. Indication: Procedural area  Code Status: No CPR- Pre-arrest intubation OK      Clinically Significant Risk Factors         # Hyponatremia: Lowest Na = 134 mmol/L in last 2 days, will monitor as appropriate  # Hypochloremia: Lowest Cl = 94 mmol/L in last 2 days, will monitor as appropriate   # Hypercalcemia: corrected calcium is >10.1, will  "monitor as appropriate    # Hypoalbuminemia: Lowest albumin = 3.1 g/dL at 4/7/2025  6:41 AM, will monitor as appropriate     # Hypertension: Noted on problem list  # Chronic heart failure with preserved ejection fraction: heart failure noted on problem list and last echo with EF >50%          # DMII: A1C = 6.5 % (Ref range: <5.7 %) within past 6 months   # Obesity: Estimated body mass index is 31.92 kg/m  as calculated from the following:    Height as of this encounter: 1.676 m (5' 6\").    Weight as of this encounter: 89.7 kg (197 lb 12 oz).      # Financial/Environmental Concerns:           Social Drivers of Health    Tobacco Use: Medium Risk (3/30/2025)    Patient History     Smoking Tobacco Use: Former     Smokeless Tobacco Use: Never   Physical Activity: Insufficiently Active (7/19/2024)    Exercise Vital Sign     Days of Exercise per Week: 1 day     Minutes of Exercise per Session: 10 min   Social Connections: Unknown (7/19/2024)    Social Connection and Isolation Panel [NHANES]     Frequency of Social Gatherings with Friends and Family: More than three times a week          Disposition Plan     Medically Ready for Discharge: Anticipated Tomorrow             Blaise Holden MD  Hospitalist Service  [facility]  Securely message with Auto I.D. (more info)  Text page via [employer] Paging/Directory   ______________________________________________________________________    Interval History   Seen and examined in AM. On NIPPV, awakened to voice, denied issues. Updated and encouraged her that CRP and WBC improving/normal respectively; and that we would continue to wean O2 and she can then try to work on getting home. No new pain, fevers, or chills.  Discussed with RN.    Physical Exam   Vital Signs: Temp: 98.5  F (36.9  C) Temp src: Oral BP: (!) 179/55 Pulse: 80   Resp: 20 SpO2: 99 % O2 Device: BiPAP/CPAP Oxygen Delivery: 3 LPM  Weight: 197 lbs 12.04 oz    Gen: NAD, pleasant, resting but awakened " to voice  HEENT: EOMI, MMM  Resp: mask on, no focal crackles,  no wheezes, no increased work of resp  CV: S1S2 heard, reg rhythm, reg rate  Abdo: soft, nontender, nondistended, bowel sounds present  Ext: calves nontender, well perfused  Neuro: as above, moving ext, CN grossly intact, no facial asymmetry      Medical Decision Making       51 MINUTES SPENT BY ME on the date of service doing chart review, history, exam, documentation & further activities per the note.      Data     I have personally reviewed the following data over the past 24 hrs:    7.1  \   7.7 (L)   / 199     N/A N/A N/A /  136 (H)   N/A N/A N/A \     Procal: N/A CRP: 12.72 (H) Lactic Acid: N/A

## 2025-04-08 NOTE — PLAN OF CARE
"Patient Name: Geronimo  MRN: [id number]  Date of Admission: 3/27/2025  Reason for Admission: bacteremia, constipation  Level of Care: [facility]    Vitals:   BP Readings from Last 1 Encounters:   04/08/25 (!) 168/79     Pulse Readings from Last 1 Encounters:   04/08/25 77     Wt Readings from Last 1 Encounters:   04/02/25 98.1 kg (216 lb 4.3 oz)     Ht Readings from Last 1 Encounters:   03/27/25 1.676 m (5' 6\")     Estimated body mass index is 34.91 kg/m  as calculated from the following:    Height as of this encounter: 1.676 m (5' 6\").    Weight as of this encounter: 98.1 kg (216 lb 4.3 oz).  Temp Readings from Last 1 Encounters:   04/08/25 97.8  F (36.6  C) (Axillary)       Pain: denies pain    CV Surgery Patient: No    Assessment    Resp: DEVRIES and reports SOB at times, otherwise VSS on Bipap FiO2 30% overnight, on 3L NC intermittently. Hypertensive at times, on scheduled hydralazine.   Telemetry: SR w/ST depression  Neuro: A&O to self, place; intermittent confusion, anxious, easily redirectable  GI/: Renal diet, LUQ G-tube infusing nocturnal TF at 65mL/hr, tolerated well. Pt reports abdominal discomfort/constipation, scheduled bowel meds given through G-tube, +3 BM. Oliguric, pt on hemodialysis MWF.   Skin/Wounds: LUE fistula, bruit/thrill present, dressing CDI. Buttock wound, dressing CDI. R arm skin tear, dressing CDI.   Lines/Drains: R PIV SL, LUQ G-tube  Activity: Assist x2 turn/repo q2h  Sleep: Slept intermittently between cares  Abnormal Labs: BG ACHS 175, 191.     Aggression Stop Light: Green          Patient Care Plan: Discharge home w/home care when medically ready.       Goal Outcome Evaluation:      Plan of Care Reviewed With: patient    Overall Patient Progress: improvingOverall Patient Progress: improving           "

## 2025-04-08 NOTE — PROGRESS NOTES
Care Management Follow Up    Length of Stay (days): 11    Expected Discharge Date: 04/11/2025     Concerns to be Addressed:       Patient plan of care discussed at interdisciplinary rounds: Yes    Anticipated Discharge Disposition: Home, Home Care, Transitional Care              Anticipated Discharge Services: Transportation Services, Home Care  Anticipated Discharge DME:      Patient/family educated on Medicare website which has current facility and service quality ratings: no  Education Provided on the Discharge Plan:    Patient/Family in Agreement with the Plan: yes    Referrals Placed by CM/SW:    Private pay costs discussed: Not applicable    Discussed  Partnership in Safe Discharge Planning  document with patient/family: No     Handoff Completed: No, handoff not indicated or clinically appropriate    Additional Information:  Spoke with patients George Hguhes  Cesar [phone]  regarding discharge plans and that patient will be returning home with daughter Caroline Gray who will be caring for patient.   Ellen stated patients  elderly waiver has been stopped as she was out of her home for ^30 days . She will be able to reinstate patient once she is in the community. Ellen requested writer to notify her day before patient is discharged.   Notified [facility] of poss discharge soon and they requested to refax the orders and F2F   Next Steps: Need o2 orders for home. MD will do it in AM. Need script for pill  and BP monitor.    Manuel Richey RN -[phone]

## 2025-04-08 NOTE — PROGRESS NOTES
Patient has been assessed for Home Oxygen needs. Oxygen readings:    *Pulse oximetry (SpO2) = 82% on room air at rest while awake.    *SpO2 improved to 93% on 3liters/minute at rest.    *SpO2 = % on room air during activity/with exercise.    *SpO2 improved to % on liters/minute during activity/with exercise.

## 2025-04-08 NOTE — PROGRESS NOTES
[facility]    Infectious Disease Progress Note    Date of Service (when I saw the patient): 04/08/2025     Assessment & Plan   Supriya Herr is a 75 year old female who was admitted on 3/27/2025.     Impression:  74 yo female with a history of ESRD on HD, CHF, DM, HTN, JONE, traumatic left AKA, and obesity who presented with abdominal pain and dry heaves and has since been found to have E. Faecalis bacteremia and urine culture also growing E. Faecalis.      -Afebrile.   -Leukocytosis.   -Blood cultures from 3/27 and 3/28 with E. Faecalis. Blood cultures from 3/29 and on are no growth.   -Urine culture with 50-100K E. Faecalis. Produces very little urine with ESRD. No urinary symptoms. Urine may either be source initially or spillover, imaging without obvious abscess, obstruction etc.  -CT C/A/P with mild bladder wall thickening, rectal stool ball, no abscess.   -SHAHNAZ with no evidence of a mass or vegetation -this was a good quality SHAHNAZ   -On Vancomycin  -Allergy listed to Unasyn and Penicillin (rash).        Recommendations:   Will plan on treating with vancomycin with dialysis for 6 weeks (last day of therapy to be 5/10/25).   Nephrology to place orders for Vancomycin to be given at dialysis upon discharge.   Will need blood cultures repeated one week after completing IV antibiotics.   Will arrange outpatient follow-up with ID in 2-3 weeks once patient discharges.     Patient and plan discussed with Dr. Cohen.     Arcelia Velazquze PA-C    Interval History   Tolerating antibiotics ok   No new rashes or issues with antibiotics   Still having some breathing issues, on BiPAP this am.   Labs reviewed   No changes to past medical, social or family history         Physical Exam   Temp: 98.2  F (36.8  C) Temp src: Oral BP: (!) 169/50 Pulse: 80   Resp: 18 SpO2: 99 % O2 Device: BiPAP/CPAP Oxygen Delivery: 3 LPM  Vitals:    04/02/25 0600 04/02/25 1445 04/08/25 0605   Weight: 93.8 kg (206 lb 14.4 oz)  98.1 kg (216 lb 4.3 oz) 89.7 kg (197 lb 12 oz)     Vital Signs with Ranges  Temp:  [97.6  F (36.4  C)-98.9  F (37.2  C)] 98.2  F (36.8  C)  Pulse:  [66-82] 80  Resp:  [16-20] 18  BP: ()/(37-88) 169/50  FiO2 (%):  [30 %] 30 %  SpO2:  [88 %-100 %] 99 %    Constitutional: Awake, alert, cooperative, no apparent distress  Lungs: Clear to auscultation bilaterally, no crackles or wheezing. On Bipap  Cardiovascular: Regular rate and rhythm, normal S1 and S2, and no murmur noted  Abdomen: Normal bowel sounds, soft, non-distended, non-tender  Skin: No rashes, no cyanosis, no edema  Other:    Medications   Current Facility-Administered Medications   Medication Dose Route Frequency Provider Last Rate Last Admin    dextrose 10% infusion   Intravenous Continuous PRN Ekaterina Gayle MD        heparin 10,000 units/10 mL infusion (DIALYSIS USE)  500 Units/hr Hemodialysis Machine Continuous Dusty Prieto MD   Stopped at 04/07/25 0950    May take oral meds with a sip of water, the morning of SHAHNAZ procedure.   Does not apply Continuous PRN Ganesh Lawson MD        Stop Heparin 60 minutes before end of treatment   Does not apply Continuous PRN Dusty Prieto MD         Current Facility-Administered Medications   Medication Dose Route Frequency Provider Last Rate Last Admin    - MEDICATION INSTRUCTIONS for Dialysis Patients -   Does not apply See Admin Instructions Ekaterina Gayle MD        amiodarone (PACERONE) tablet 200 mg  200 mg Oral or Feeding Tube BID Ekaterina Gayle MD   200 mg at 04/08/25 1024    amLODIPine (NORVASC) tablet 10 mg  10 mg Oral or Feeding Tube Once per day on Sunday Tuesday Thursday Saturday Ekaterina Gayle MD   10 mg at 04/08/25 1025    apixaban ANTICOAGULANT (ELIQUIS) tablet 5 mg  5 mg Oral or Feeding Tube BID Ekaterina Gayle MD   5 mg at 04/08/25 1025    atorvastatin (LIPITOR) tablet 20 mg  20 mg Oral or Feeding Tube QPM Ekaterina Gayle MD   20 mg at 04/07/25 9675     calcitRIOL (ROCALTROL) capsule 0.25 mcg  0.25 mcg Oral Once per day on Monday Wednesday Friday Ekaterina Gayle MD   0.25 mcg at 04/07/25 1330    cetirizine (zyrTEC) tablet 10 mg  10 mg Oral or Feeding Tube At Bedtime Ekaterina Gayle MD   10 mg at 04/07/25 2201    hydrALAZINE (APRESOLINE) tablet 75 mg  75 mg Oral or Feeding Tube Q8H Ekaterina Gayle MD   75 mg at 04/08/25 0605    insulin aspart (NovoLOG) injection (RAPID ACTING)  1-7 Units Subcutaneous TID AC Blaise Holden MD   1 Units at 04/07/25 0909    insulin aspart (NovoLOG) injection (RAPID ACTING)  1-5 Units Subcutaneous At Bedtime Blaise Holden MD        insulin glargine (LANTUS PEN) injection 12 Units  12 Units Subcutaneous BID Ekaterina Gayle MD   12 Units at 04/08/25 1041    midodrine (PROAMATINE) tablet 5 mg  5 mg Oral Once per day on Monday Wednesday Friday Ekaterina Gayle MD   5 mg at 04/07/25 0743    pantoprazole (PROTONIX) 2 mg/mL suspension 40 mg  40 mg Per Feeding Tube QAM AC Ektaerina Gayle MD   40 mg at 04/08/25 1036    polyethylene glycol (MIRALAX) Packet 17 g  17 g Oral or Feeding Tube BID Ekaterina Gayle MD   17 g at 04/08/25 1024    [Held by provider] Prosource TF20 ENfit Compatibl EN LIQD (PROSOURCE TF20) packet 60 mL  1 packet Per Feeding Tube Daily Ekaterina Gayle MD   60 mL at 03/31/25 1407    sennosides (SENOKOT) tablet 2 tablet  2 tablet Oral BID Blaise Holden MD   2 tablet at 04/08/25 1036    sertraline (ZOLOFT) tablet 75 mg  75 mg Oral or Feeding Tube Daily Ekaterina Gayle MD   75 mg at 04/08/25 1025    sevelamer carbonate (RENVELA) tablet 1,600 mg  1,600 mg Oral TID w/meals Ekaterina Gayle MD   1,600 mg at 04/07/25 1816    sodium chloride (PF) 0.9% PF flush 3 mL  3 mL Intravenous Q8H Ganesh Lawson MD   3 mL at 04/08/25 1054    sodium chloride (PF) 0.9% PF flush 3 mL  3 mL Intracatheter Q8H [facility] Ekaterina Gayle MD   3 mL at 04/08/25 0605    sodium chloride 0.9% BOLUS  200 mL  200 mL Hemodialysis Machine Once Dusty Prieto MD        sodium chloride 0.9% BOLUS 250 mL  250 mL Intravenous Once in dialysis/CRRT Dusty Prieto MD        sodium chloride 0.9% BOLUS 500 mL  500 mL Hemodialysis Machine Once Dusty Prieto MD        vancomycin place cardoso - receiving intermittent dosing  1 each Intravenous See Admin Instructions Ekaterina Gayle MD        vitamin D3 (CHOLECALCIFEROL) tablet 50 mcg  50 mcg Oral or Feeding Tube Daily Ekaterina Gayle MD   50 mcg at 04/08/25 1024       Data   All microbiology laboratory data reviewed.  Recent Labs   Lab Test 04/08/25  0544 04/07/25  0641 04/05/25  0607 04/04/25  0629   WBC 7.1  --  5.7 8.6   HGB 7.7* 7.5* 8.1* 7.8*   HCT 25.1*  --  27.0* 25.3*   MCV 95  --  97 95     --  208 191     Recent Labs   Lab Test 04/07/25  0641 04/05/25  0607 04/04/25  0629   CR 4.92* 2.51* 3.83*            03/30/2025 0616 04/04/2025 1046 Blood Culture Arm, Right [[id number]]    Blood from Arm, Right    Final result Component Value   Culture No Growth             03/30/2025 0616 04/04/2025 1046 Blood Culture Hand, Right [[id number]]    Blood from Hand, Right    Final result Component Value   Culture No Growth             03/29/2025 1532 04/03/2025 2103 Blood Culture Wrist, Right [[id number]]    Blood from Wrist, Right    Final result Component Value   Culture No Growth             03/29/2025 1139 04/03/2025 1605 Blood Culture Arm, Right [[id number]]   Blood from Arm, Right    Final result Component Value   Culture No Growth             03/28/2025 1249 03/31/2025 0731 Blood Culture Peripheral Blood [[id number]]   (Abnormal)   Peripheral Blood    Final result Component Value   Culture Positive on the 1st day of incubation Abnormal     Enterococcus faecalis Panic     2 of 2 bottles  Susceptibilities done on previous cultures             03/28/2025 1249 03/31/2025 0732 Blood Culture Peripheral Blood [[id number]]   (Abnormal)   Peripheral  Blood    Final result Component Value   Culture Positive on the 1st day of incubation Abnormal     Enterococcus faecalis Panic     1 of 2 bottles  Susceptibilities done on previous cultures             03/28/2025 0611 03/28/2025 1224 MRSA MSSA PCR, Nasal Swab [[id number]]    Swab from Nose    Final result Component Value   MRSA Target DNA Negative   SA Target DNA Negative          03/27/2025 2025 03/30/2025 0716 Blood Culture Hand, Right [[id number]]   (Abnormal)   Blood from Hand, Right    Final result Component Value   Culture Positive on the 1st day of incubation Abnormal     Enterococcus faecalis Panic     2 of 2 bottles  Susceptibilities done on previous cultures             03/27/2025 1646 03/27/2025 1744 Influenza A/B, RSV and SARS-CoV2 PCR (COVID-19) Nose [[id number]]    Swab from Nose    Final result Component Value   Influenza A PCR Negative   Influenza B PCR Negative   RSV PCR Negative   SARS CoV2 PCR Negative   NEGATIVE: SARS-CoV-2 (COVID-19) RNA not detected, presumed negative.          03/27/2025 1635 03/30/2025 0722 Blood Culture Hand, Right [[id number]]    (Abnormal)   Blood from Hand, Right    Final result Component Value   Culture Positive on the 1st day of incubation Abnormal     Enterococcus faecalis Panic     2 of 2 bottles       Susceptibility     Enterococcus faecalis     CARLOS     Ampicillin <=2 ug/mL Susceptible     Gentamicin Synergy Susceptible... Susceptible 1     Vancomycin 1 ug/mL Susceptible              1 No high level gentamicin resistance found - therefore combination therapy with an aminoglycoside may be indicated for serious enterococcal infections such as bacteremia and endocarditis.             03/27/2025 1635 03/28/2025 0727 Verigene GP Panel [[id number]]    (Abnormal)   Blood from Hand, Right    Final result Component Value   Staphylococcus species Not Detected   Staphylococcus aureus Not Detected   Staphylococcus epidermidis Not Detected   Staphylococcus lugdunensis  Not Detected   Enterococcus faecalis Detected Abnormal    Positive for Enterococcus faecalis and negative for Mary/vanB genes (not resistant to vancomycin) by [employer] multiplex nucleic acid test. Final identification and antimicrobial susceptibility testing will be verified by standard methods.   Enterococcus faecium Not Detected   Streptococcus species Not Detected   Streptococcus agalactiae Not Detected   Streptococcus anginosus group Not Detected   Streptococcus pneumoniae Not Detected   Streptococcus pyogenes Not Detected   Listeria species Not Detected          03/27/2025 1443 03/29/2025 1401 Urine Culture [[id number]]    (Abnormal)   Urine, Catheter    Final result Component Value   Culture 50,000-100,000 CFU/mL Enterococcus faecalis Abnormal        Susceptibility     Enterococcus faecalis     CARLOS     Ampicillin <=2 ug/mL Susceptible     Nitrofurantoin <=16 ug/mL Susceptible     Vancomycin 1 ug/mL Susceptible

## 2025-04-08 NOTE — PROGRESS NOTES
"CLINICAL NUTRITION SERVICES - REASSESSMENT NOTE     Registered Dietitian Interventions:  - Trial Nepro  - Suggested small/frequent meals     Consider liberalized diet?        INFORMATION OBTAINED  Assessed patient in room. Sister present.     CURRENT NUTRITION ORDERS  Diet: Renal  Snacks/Supplements: none currently ordered  Nutrition Support:     Nutrition Support Enteral:  Type of Feeding Tube: GT  Enteral Frequency:  Cyclic  Enteral Regimen: Grecia Estrella Renal @ 65 ml/hr   Total Enteral Provisions: 1404 kcals (84% estimated energy needs), 62 g PRO (94% estimated protein needs), 134 g CHO, 16 g fiber, and 515 ml free H2O daily.   Free Water Flush: 60 mL before and after cycle     CURRENT INTAKE/TOLERANCE  - Oral intakes remain poor. She hadn't ordered any meals yet today by visit afternoon. She prefers softer foods (but NOT purees). Mashed potatoes are OK, she likes the pot roast but otherwise hasn't found a lot of foods she prefers - says everything is flavorless (due to diet restriction).   - Sister in room is encouraging, providing suggestions and offering to bring different foods into the hospital. Pt mostly declines.   - She did agree to try a Berry Nepro this afternoon. \"Just 1\".   - Pt complains of constipation.      NEW FINDINGS  GI symptoms:  Patient reports constipation. No nausea. Poor appetite.      Skin/wounds: Buttock wound from friction, IAD, intertrigo.     Nutrition-relevant labs:  No BMP today.   Recent Labs   Lab 04/08/25  1138 04/08/25  0838 04/08/25  0156 04/07/25  2223 04/07/25  1708 04/07/25  1309   * 136* 191* 175* 73 102*     Nutrition-relevant medications:   Aguilar has been on hold.   Med sliding scale insulin + 12 units lantus   Miralax, Senokot  Vitamin D3 daily     Weight: 89.7 kg - consistent with admit wt of 89.9 kg.   Wt Readings from Last 5 Encounters:   04/08/25 89.7 kg (197 lb 12 oz)   03/25/25 92.5 kg (204 lb)   03/17/25 92.7 kg (204 lb 5.9 oz)   03/06/25 101.8 kg (224 lb " 6.9 oz)   02/26/25 92.5 kg (203 lb 14.8 oz)       ASSESSED NUTRITION NEEDS  Dosing Weight: 66.8 kg, based on adjusted wt based on lowest, admit wt  Estimated Energy Needs: 5757-9975 kcals/day (25 - 35 kcals/kg)  Justification: HD  Estimated Protein Needs:  grams protein/day (1.2 - 1.8 grams of pro/kg)  Justification: HD (w/ DM)  Estimated Fluid Needs: 1 mL/kcal   Justification: Per provider pending fluid status     MALNUTRITION  % Intake: Decreased intake does not meet criteria EN providing >75% estimated needs.   % Weight Loss: Weight loss does not meet criteria   Subcutaneous Fat Loss:  Global moderate from prolonged admission   Muscle Loss:  Global moderate from prolonged admission   Fluid Accumulation/Edema: Mild, 1+  Malnutrition Diagnosis: Moderate malnutrition in the context of acute illness or injury  Malnutrition Present on Admission: No    EVALUATION OF THE PROGRESS TOWARD GOALS   Previous Goals  EN - goal rate to meet at least 89% energy and 100% PRO needs  Evaluation: Unable to meet w/ Prosource on hold   PO - to make up EN support energy deficit  Evaluation:  Unable to assess    Previous Nutrition Diagnosis  Inadequate oral intake related to altered GI functioning as evidenced by pt reports stomach pain as a barrier to PO intake and need to increase EN support to meet >90% estimated needs until able to increase PO intake.   Evaluation: No change    NUTRITION DIAGNOSIS  Inadequate oral intake related to poor appetite, diet restriction to renal as evidenced by negligible PO with ongoing reliance on TF to meet >75% estimated needs.     INTERVENTIONS  Medical food supplement therapy - Trial Nepro  Offered suggestions, encouragement, suggested pt consume small/frequent meals.     GOALS  TF @ goal to provide >75% of estimated needs while PO intake remains poor.      MONITORING/EVALUATION  Progress toward goals will be monitored and evaluated per policy.    Ladonna Wallace, RD, LD  Pager:  [phone]  Available on [employer]

## 2025-04-09 ENCOUNTER — APPOINTMENT (OUTPATIENT)
Dept: GENERAL RADIOLOGY | Facility: CLINIC | Age: 75
End: 2025-04-09
Attending: INTERNAL MEDICINE
Payer: COMMERCIAL

## 2025-04-09 ENCOUNTER — APPOINTMENT (OUTPATIENT)
Dept: CT IMAGING | Facility: CLINIC | Age: 75
End: 2025-04-09
Attending: INTERNAL MEDICINE
Payer: COMMERCIAL

## 2025-04-09 LAB
ABO + RH BLD: NORMAL
BLD GP AB SCN SERPL QL: NEGATIVE
BLD PROD TYP BPU: NORMAL
BLOOD COMPONENT TYPE: NORMAL
CODING SYSTEM: NORMAL
CROSSMATCH: NORMAL
GLUCOSE BLDC GLUCOMTR-MCNC: 105 MG/DL (ref 70–99)
GLUCOSE BLDC GLUCOMTR-MCNC: 137 MG/DL (ref 70–99)
GLUCOSE BLDC GLUCOMTR-MCNC: 159 MG/DL (ref 70–99)
GLUCOSE BLDC GLUCOMTR-MCNC: 212 MG/DL (ref 70–99)
GLUCOSE BLDC GLUCOMTR-MCNC: 86 MG/DL (ref 70–99)
HGB BLD-MCNC: 7.6 G/DL (ref 11.7–15.7)
ISSUE DATE AND TIME: NORMAL
POTASSIUM SERPL-SCNC: 4.4 MMOL/L (ref 3.4–5.3)
RADIOLOGIST FLAGS: NORMAL
SPECIMEN EXP DATE BLD: NORMAL
UNIT ABO/RH: NORMAL
UNIT NUMBER: NORMAL
UNIT STATUS: NORMAL
UNIT TYPE ISBT: 600
VANCOMYCIN SERPL-MCNC: 23.4 UG/ML

## 2025-04-09 PROCEDURE — 71250 CT THORAX DX C-: CPT

## 2025-04-09 PROCEDURE — 84132 ASSAY OF SERUM POTASSIUM: CPT | Performed by: INTERNAL MEDICINE

## 2025-04-09 PROCEDURE — 250N000013 HC RX MED GY IP 250 OP 250 PS 637: Performed by: INTERNAL MEDICINE

## 2025-04-09 PROCEDURE — 250N000013 HC RX MED GY IP 250 OP 250 PS 637: Performed by: HOSPITALIST

## 2025-04-09 PROCEDURE — 250N000013 HC RX MED GY IP 250 OP 250 PS 637: Performed by: STUDENT IN AN ORGANIZED HEALTH CARE EDUCATION/TRAINING PROGRAM

## 2025-04-09 PROCEDURE — 36415 COLL VENOUS BLD VENIPUNCTURE: CPT | Performed by: STUDENT IN AN ORGANIZED HEALTH CARE EDUCATION/TRAINING PROGRAM

## 2025-04-09 PROCEDURE — 99233 SBSQ HOSP IP/OBS HIGH 50: CPT | Performed by: INTERNAL MEDICINE

## 2025-04-09 PROCEDURE — P9016 RBC LEUKOCYTES REDUCED: HCPCS | Performed by: INTERNAL MEDICINE

## 2025-04-09 PROCEDURE — 250N000009 HC RX 250: Mod: JW | Performed by: INTERNAL MEDICINE

## 2025-04-09 PROCEDURE — 250N000011 HC RX IP 250 OP 636: Performed by: STUDENT IN AN ORGANIZED HEALTH CARE EDUCATION/TRAINING PROGRAM

## 2025-04-09 PROCEDURE — 71045 X-RAY EXAM CHEST 1 VIEW: CPT

## 2025-04-09 PROCEDURE — 90935 HEMODIALYSIS ONE EVALUATION: CPT | Performed by: INTERNAL MEDICINE

## 2025-04-09 PROCEDURE — 94660 CPAP INITIATION&MGMT: CPT

## 2025-04-09 PROCEDURE — 36415 COLL VENOUS BLD VENIPUNCTURE: CPT | Performed by: INTERNAL MEDICINE

## 2025-04-09 PROCEDURE — 120N000001 HC R&B MED SURG/OB

## 2025-04-09 PROCEDURE — 999N000157 HC STATISTIC RCP TIME EA 10 MIN

## 2025-04-09 PROCEDURE — 250N000011 HC RX IP 250 OP 636: Performed by: INTERNAL MEDICINE

## 2025-04-09 PROCEDURE — 258N000003 HC RX IP 258 OP 636: Performed by: INTERNAL MEDICINE

## 2025-04-09 PROCEDURE — 86923 COMPATIBILITY TEST ELECTRIC: CPT | Performed by: INTERNAL MEDICINE

## 2025-04-09 PROCEDURE — 86900 BLOOD TYPING SEROLOGIC ABO: CPT | Performed by: INTERNAL MEDICINE

## 2025-04-09 PROCEDURE — 90937 HEMODIALYSIS REPEATED EVAL: CPT

## 2025-04-09 PROCEDURE — 85018 HEMOGLOBIN: CPT | Performed by: INTERNAL MEDICINE

## 2025-04-09 PROCEDURE — 80202 ASSAY OF VANCOMYCIN: CPT | Performed by: STUDENT IN AN ORGANIZED HEALTH CARE EDUCATION/TRAINING PROGRAM

## 2025-04-09 RX ORDER — AMIODARONE HYDROCHLORIDE 200 MG/1
200 TABLET ORAL DAILY
Status: ON HOLD | COMMUNITY
End: 2025-04-17

## 2025-04-09 RX ORDER — HYDROXYZINE HYDROCHLORIDE 25 MG/1
50 TABLET, FILM COATED ORAL EVERY 6 HOURS PRN
Status: DISCONTINUED | OUTPATIENT
Start: 2025-04-09 | End: 2025-04-09

## 2025-04-09 RX ORDER — AMIODARONE HYDROCHLORIDE 200 MG/1
200 TABLET ORAL DAILY
Status: DISCONTINUED | OUTPATIENT
Start: 2025-04-10 | End: 2025-04-17 | Stop reason: HOSPADM

## 2025-04-09 RX ORDER — HYDROXYZINE HYDROCHLORIDE 25 MG/1
25 TABLET, FILM COATED ORAL EVERY 6 HOURS PRN
Status: DISCONTINUED | OUTPATIENT
Start: 2025-04-09 | End: 2025-04-17 | Stop reason: HOSPADM

## 2025-04-09 RX ORDER — VANCOMYCIN HYDROCHLORIDE 1 G/200ML
1000 INJECTION, SOLUTION INTRAVENOUS ONCE
Status: COMPLETED | OUTPATIENT
Start: 2025-04-09 | End: 2025-04-09

## 2025-04-09 RX ADMIN — Medication 40 MG: at 09:39

## 2025-04-09 RX ADMIN — SENNOSIDES 2 TABLET: 8.6 TABLET ORAL at 09:39

## 2025-04-09 RX ADMIN — APIXABAN 5 MG: 5 TABLET, FILM COATED ORAL at 09:41

## 2025-04-09 RX ADMIN — APIXABAN 5 MG: 5 TABLET, FILM COATED ORAL at 22:22

## 2025-04-09 RX ADMIN — HYDRALAZINE HYDROCHLORIDE 75 MG: 25 TABLET ORAL at 22:22

## 2025-04-09 RX ADMIN — MIDODRINE HYDROCHLORIDE 5 MG: 5 TABLET ORAL at 13:42

## 2025-04-09 RX ADMIN — AMIODARONE HYDROCHLORIDE 200 MG: 200 TABLET ORAL at 09:40

## 2025-04-09 RX ADMIN — LIDOCAINE HYDROCHLORIDE 0.5 ML: 10 INJECTION, SOLUTION EPIDURAL; INFILTRATION; INTRACAUDAL; PERINEURAL at 17:00

## 2025-04-09 RX ADMIN — Medication 50 MCG: at 09:40

## 2025-04-09 RX ADMIN — CETIRIZINE HYDROCHLORIDE 10 MG: 10 TABLET, FILM COATED ORAL at 22:22

## 2025-04-09 RX ADMIN — SERTRALINE HYDROCHLORIDE 75 MG: 50 TABLET ORAL at 09:40

## 2025-04-09 RX ADMIN — HYDROXYZINE HYDROCHLORIDE 25 MG: 25 TABLET, FILM COATED ORAL at 04:56

## 2025-04-09 RX ADMIN — ATORVASTATIN CALCIUM 20 MG: 20 TABLET, FILM COATED ORAL at 22:22

## 2025-04-09 RX ADMIN — HYDROXYZINE HYDROCHLORIDE 25 MG: 25 TABLET, FILM COATED ORAL at 11:06

## 2025-04-09 RX ADMIN — HEPARIN SODIUM 500 UNITS/HR: 1000 INJECTION, SOLUTION INTRAVENOUS; SUBCUTANEOUS at 16:59

## 2025-04-09 RX ADMIN — INSULIN ASPART 2 UNITS: 100 INJECTION, SOLUTION INTRAVENOUS; SUBCUTANEOUS at 09:42

## 2025-04-09 RX ADMIN — HYDROXYZINE HYDROCHLORIDE 25 MG: 25 TABLET ORAL at 22:22

## 2025-04-09 RX ADMIN — VANCOMYCIN HYDROCHLORIDE 1000 MG: 1 INJECTION, SOLUTION INTRAVENOUS at 20:22

## 2025-04-09 RX ADMIN — HYDRALAZINE HYDROCHLORIDE 75 MG: 25 TABLET ORAL at 04:56

## 2025-04-09 RX ADMIN — Medication: at 17:00

## 2025-04-09 RX ADMIN — INSULIN GLARGINE 12 UNITS: 100 INJECTION, SOLUTION SUBCUTANEOUS at 22:32

## 2025-04-09 RX ADMIN — SODIUM CHLORIDE 500 ML: 9 INJECTION, SOLUTION INTRAVENOUS at 16:59

## 2025-04-09 RX ADMIN — INSULIN GLARGINE 12 UNITS: 100 INJECTION, SOLUTION SUBCUTANEOUS at 09:41

## 2025-04-09 RX ADMIN — POLYETHYLENE GLYCOL 3350 17 G: 17 POWDER, FOR SOLUTION ORAL at 09:39

## 2025-04-09 RX ADMIN — QUETIAPINE 12.5 MG: 25 TABLET, FILM COATED ORAL at 15:21

## 2025-04-09 ASSESSMENT — ACTIVITIES OF DAILY LIVING (ADL)
ADLS_ACUITY_SCORE: 93
ADLS_ACUITY_SCORE: 87
ADLS_ACUITY_SCORE: 92
ADLS_ACUITY_SCORE: 89
ADLS_ACUITY_SCORE: 87
ADLS_ACUITY_SCORE: 93
ADLS_ACUITY_SCORE: 93
ADLS_ACUITY_SCORE: 89
ADLS_ACUITY_SCORE: 85
ADLS_ACUITY_SCORE: 93
ADLS_ACUITY_SCORE: 87
ADLS_ACUITY_SCORE: 93
ADLS_ACUITY_SCORE: 89
ADLS_ACUITY_SCORE: 93

## 2025-04-09 NOTE — PLAN OF CARE
"1900-0730    Orientations: AxOx2, disoriented to time and situation. Forgetful. Frequently anxious and states that she \"can't breathe\" even with good O2 sats and oxygen applied-- improves with reassurance.  Vitals/Pain: VSS on 3LNC or oxymask / BIPAP 30% overnight. Denies pain. PRN Atarax given x2 for anxiety.  Tele: NSR  Lines/Drains: PIV x1 SL. LUE fistula, thrill + bruit present. L PEG tube.  Skin/Wounds: Scattered bruises/scrapes. Blanchable redness to coccyx/buttocks. Healing wound covered with Mepilex-- red/pink + scabbing. RUE skin tear.  GI/: Renal diet w/ overnight TF. Anuric, on HD. BM x2.   Labs: , 159.   Ambulation/Assist: Assist x2 with lift; turn and repo q2h.  Sleep Quality: Sleeping between cares.    Plan: Dialysis run today. Encourage activity/OOB, PO intake. Discharge home pending acquisition of DME/home O2/home-care RN-- tentatively on Thursday.   "

## 2025-04-09 NOTE — PROGRESS NOTES
DIALYSIS PROCEDURE NOTE  Hepatitis status of previous patient on machine log was checked and verified ok to use with this patients hepatitis status.  Patient dialyzed for 3 hrs. on a K2 and K3 bath (switched an hour and 30 minutes into run for potassium of 4.4) with a net fluid removal of  3L.  A BFR of 400-450 ml/min was obtained via a left AVF using 15 gauge needles.      The treatment plan was discussed with Dr. Roman during the treatment.    Total heparin received during the treatment: 1000 units.      Arterial sites held for 15 minutes.      Meds  given: PBRC given on run by writer 300 mL.   Complications: none       Person educated: patient . Knowledge base substantial. Barriers to learning: none. Educated on procedure via verbal mode. The patient verbalized understanding. Pt prefers verbal education style.      ICEBOAT? Timeout performed pre-treatment  I: Patient was identified using 2 identifiers  C:  Consent Signed Yes  E: Equipment preventative maintenance is current and dialysis delivery system OK to use  B: Hepatitis B Surface Antigen: Negative; Draw Date: 4/4/25      Hepatitis B Surface Antibody: Susceptible; Draw Date: 4/4/25  O: Dialysis orders present and complete prior to treatment  A: Vascular access verified and assessed prior to treatment  T: Treatment was performed at a clinically appropriate time  ?: Patient was allowed to ask questions and address concerns prior to treatment  See Adult Hemodialysis flowsheet in EPIC for further details and post assessment.  Machine water alarm in place and functioning. Transducer pods intact and checked every 15min.   Pt returned via bed.  Chlorine/Chloramine water system checked every 4 hours.  Outpatient Dialysis at [facility] on MWF.     Patient repositioned every 2 hours during the treatment.  Post treatment report given to JOE Welch RN regarding 3L of fluid removed, last BP of 125/52, and patient pain rating of 0/10.        Please remove patient  dressing on AVF and AVG needle sites 24 hours after dialysis. If leaking occurs please apply a Band-Aid.

## 2025-04-09 NOTE — PROGRESS NOTES
"Seen on dialysis. She says she feels ok. Blood pressures are in the 140-170 range. O2 saturations are in the 90's regardless of how the O2 is being delivered. Today she appears less agitated on dialysis.    Vital signs:  Temp: 97.6  F (36.4  C) Temp src: Oral BP: (!) 176/55 Pulse: 73     SpO2: 96 % O2 Device: Oxymask Oxygen Delivery: 4 LPM Height: 167.6 cm (5' 6\") Weight: 93 kg (205 lb 0.4 oz)  Estimated body mass index is 33.09 kg/m  as calculated from the following:    Height as of this encounter: 1.676 m (5' 6\").    Weight as of this encounter: 93 kg (205 lb 0.4 oz).      Gen; sleeping on dialysis  Lungs:course breath sounds anterior with some rhonchi  Card: irregular  but rate controlled  Ext: no edema L BKA    A/P  ESKD: dialysis MWF via L upper arm AVF  2. Volume: pulling 3 kg today  3. Vanco dosing: (enterococcus I blood 3/28) Generally 500 mg IV after dialysis MWF This is preferred to large weekly doses 6 weeks therapy last day 5/10/2025 Check level pre dialysis Friday.  3. Long term goals:  spoke with daughter She would like to take her home. If vanco is being given on dialysis that is one less issue she needs to address. If a care meeting is held tomorrow I would be happy to participate.    Alysia Roman MD MS  "

## 2025-04-09 NOTE — PLAN OF CARE
"Patient Name: Geronimo  MRN: [id number]  Date of Admission: 3/27/2025  Reason for Admission: Gen. Weakness, bacteriemia, leukocytosis.   Level of Care: C, transitioned to Acute today.     Vitals:   BP Readings from Last 1 Encounters:   04/09/25 (!) 169/56     Pulse Readings from Last 1 Encounters:   04/09/25 74     Wt Readings from Last 1 Encounters:   04/08/25 89.7 kg (197 lb 12 oz)     Ht Readings from Last 1 Encounters:   03/27/25 1.676 m (5' 6\")     Estimated body mass index is 31.92 kg/m  as calculated from the following:    Height as of this encounter: 1.676 m (5' 6\").    Weight as of this encounter: 89.7 kg (197 lb 12 oz).  Temp Readings from Last 1 Encounters:   04/09/25 97.8  F (36.6  C) (Oral)       Pain: denied pain. Very anxious. Added PRN Seroquel BID today, first dose at 1521.     CV Surgery Patient: No    Assessment    Resp: DEVRIES, c/o shortness of breath dispite O2 sat >92%. O2 needs had fluctuated from 3-6 liters based on her anxiety level.   Telemetry: NSR  Neuro: intact.   GI/: inc of stool x2 this shift. Anuric.   Skin/Wounds: coccyx wound changed per POC. Left AKA.   Lines/Drains: PIV right forearm, SL. HD shunt left upper arm, bruit and thrill present.   Activity: lift. Turn Q 2 when in bed. Pt had been refusing repositioning at times due to shortness of breath. Education provided regarding skin breakdown.   Sleep:   Abnormal Labs: Hgb 7.6, will receive 1 unit PRBC during HD. CXR: Right basilar atelectasis or infiltrate, increased from previous. Pulmonary vascularity is congested with probable pulmonary edema.     Aggression Stop Light: Green          Patient Care Plan: Transfuse <8, CT of chest after HD (late  HD today). Vancomycin after HD-plan to have total of 6 weeks, last 5/10/2025. Palliative consult placed today for goals of care. Daughter updated by writer and MD over the phone.     "

## 2025-04-09 NOTE — PROGRESS NOTES
"[facility]  Hospitalist Progress Note  Owen Andrade MD  04/09/2025    Assessment & Plan   Supriya Herr is a 75 year old female with very complex past medical history including end-stage renal disease on hemodialysis, HFpEF, paroxysmal atrial fibrillation, hypertension, hyperlipidemia, anxiety, depression, chronic anemia, dysphagia status post PEG tube, type 2 diabetes, left traumatic AKA and recent hospitalization with multifactorial respiratory failure now being admitted on 3/27/2025 with abdominal pain and retching. She is found to have e/o constipation. But other evaluation notable for elevated WBC, procal and CRP of unclear etiology.      Pf note, pt with multiple recent hospitalizations. Per last discharge summary:   \"Hospitalized at [facility] from 3/2 - 3/6/2025 with shortness of breath likely multifactorial.  Volume status was difficult to determine, underwent right heart cath on 3/5 showed mildly elevated right atrial and PCWP readings.  Was treated for acute respiratory failure likely from heart failure exacerbation, atrial fibrillation, end-stage renal disease and discharged to care facility with supplemental nocturnal oxygen.\"  \"Prolonged hospitalized at [facility] from 1/8 to 2/26/2025 for acute hypoxic respiratory failure multifactorial, was intubated ( 1/9-1/18), reintubated (1/20 - 1/25).  Then was treated for shock with pressors, influenza A pneumonia, hospital-acquired pneumonia with intravenous antibiotics, steroids, antivirals.  Then also noted to have heart failure exacerbation, recurrent A-fib with RVR, subsequent bradycardia.  Then also noted to have encephalopathy likely from infectious, metabolic etiology and delirium.  During that hospitalization was noted to have dysphagia, pharyngeal edema, was evaluated by ENT on 2/16.  No findings to explain dysphagia.  G-tube was placed by IR on 2/17 and was on nocturnal tube feeds.  Discharged to TCU for rehabilitation.\"   "   Dispo: Pt's daughter prefers her to return home rather than TCU. Pt will need a number of home medical supplies prior to being able to go home. These have been ordered in the discharge navigator. It may take some time for them to be delivered to her home. Appreciate social work and care coordinator help in arranging this. Pt's daughter will also need teaching on how to do tube feedings. IV antibiotics should be arranged through nephrology so she can get them with dialysis.         *4/6 - 4/8 patient had/has been fairly optimized in general, however despite objectively having good O2 sats and CXR 4/6 unremarkable, feels SOB at times and has wanted mask or NC in place. Blood gas repeats compensated. Suspect some degree of anxiety. Updated dtr on phone.   *SW working on obtaining DME for home as patient and dtr hoping for home as above.   - Continue close monitoring and regimen below     Concern of acute hypoxic respiratory failure though mostly normal O2 sats and blood gas fairly bland  *CXRs 4/5 without new overt findings  - O2 as needed, wean as able, redirect and re-assure     E. Faecalis bacteremia  Leukocytosis  Elevated procalcitonin   Elevated CRP  *On admission, WBC elevated to 17.4, Procal elevated to 1.3, and CRP elevated to 39.78.   Unclear infectious source. She has some mild non-specific urinary bladder wall thickening, but with ESRD makes very little urine. Also has a new MIKAYLA 3mm nodule which may be infectious or inflammatory, but no respiratory complaints.   * COVID, flu RSV negative at admission   *UA very abnormal but difficult to interpret in the setting of ESRD. Ultimately Ucx positive for E faecalis. Could be initial source or seeding from bacteremia.  *Bcx positive for E. Faecalis. Unclear source. Concern for endocarditis given how quickly blood cultures are coming back positive  *TTE without vegetation seen.  *TTE completed 4/1 without concern for mass or vegetation.   - ID consulted for  assistance               - Continue IV vancomycin               - Repeat Bcx until cleared, no growth since 3/29               - Per ID, plan to complete 6 week course with IV vancomycin - last dose 5/10  - IV antibiotics should be arranged through nephrology/dialysis.   - WBC remains normal and CRP improving thru 4/8  - check CT of chest     Abdominal pain - improved/resolved  Retching, with Hx of GERD   Dysphagia s/p PEG tube (2/1/2025)   Constipation, rectal stool ball   Pt presents to the ED with vague complaints of retching/dry heaving without vomiting and some crampy lower abdominal pain.   * CT scan showed large rectal stool ball without evidence of obstruction. There was also some non-specific mild urinary bladder wall thickening. No other intra-abdominal pathology.   - Unclear exactly what is going on. Pt did not have any retching or other symptoms while I was in the room. Perhaps she has some reflux of tube feeds or other esophageal issue.  - GI consulted for assessment:               - EGD on 3/30 without etiology to explain dysphagia               - XR Esophagram normal  - Continue Protonix 40 mg daily  - Continue bowel regimen to work on the constipation   - Zofran available PRN  * Prior to 4/6, pt still having these bouts of retching/coughing/dry heaving. Resolved lately?  - Will continue to monitor. No additional work-up needed currently.   -      End-stage renal disease on HD   Dialyzes MWF via L arm fistula. Per prior notes, volume removal has often been limited by intra dialytic hypotension and a fib with RVR   *Noted that patient has been getting short of breath on Sundays in the past and there was discussion of adding a fourth dialysis day on Saturdays. As of now, nephrology challenging dry weight to see if that helps.  - Nephrology consulted   - Continue PTA midodrine MWF before dialysis   - Continue PTA calcitriol, renvela, vit D.      Heart failure with preserved ejection fraction,  chronic  Paroxysmal atrial fibrillation on chronic anticoagulation  Hypertension  Hyperlipidemia  Troponin elevation, Chronic Non-ischemic Myocardial Injury due to chronic heart failure with preserved ejection fraction and end-stage renal disease (85->85), no treatment indicated   [PTA medications include: amiodarone 200mg BID, amlodipine 10mg daily, Apixaban 5mg BID, atorvastatin 20mg daily, hydralazine 75mg Q8H,   *TTE 1/21/2025 with LVEF of 60%.  No valvular abnormality   * RHC 3/5/25 showing moderately elevated right and left-sided filling pressure; elevated wedge pressure consistent with pulmonary hypertension  * On admission now, pt has markedly elevated BNP to 51,544 (though historically is in the 20,000-40,000 range), She does have small pleural effusions on CT, but no other overt evidence of volume overload or clinical HF. She is satting well on room air.  - I/Os and daily weights ordered  - continue PTA apixaban 5 mg BID  - continue PTA atorvastatin 20 mg daily  - continue PTA hydralazine 75 mg q8H  - continue PTA amiodarone 200 mg change to daily as per discussion with [facility].   - continue PTA amlodipine 10 m qday  - Volume management per nephrology     Vaginal bleeding   Pt has had intermittent vaginal bleeding this stay. Nursing and pt quite convinced it is in fact vaginal rather than rectal or urethral.   - transvaginal ultrasound ordered for further eval negative for any concerning findings.   - Recommend outpatient Gyn follow-up      Anxiety/depression/insomnia  - Continue PTA zoloft      Anemia of chronic disease  Hemoglobin 9.2 on admission, has trended down since admission to 7.8.   - Hgb 7.7 > 7.6 (1 gram worse than before  - will plan to give 1 unit of pRBC during HD     Type II DM  - Continue PTA lantus 15 units BID   - MDSSI      Physical deconditioning from medical illness, senile frailty.  S/p left above-knee amputation, traumatic  Patient from TCU, daughter hopeful pt will be able to discharge  back home with services rather than going back to TCU.   - PT/OT consulted   - Will plan for home discharge, pt will need      Sacrococcygeal Pressure injury, Stage 3, present on admission  Wound care team followed.  Pressure injury prevention.      Obesity with a BMI of 34.  Increase all-cause mortality and morbidity.     Documentation for necessity of medical equipment      Patient needs a Semi Electric bed   Patient has Sacrococcygeal Pressure injury, Stage 3, present on admission and requires positioning of the body to alleviate pain, that cannot be accommodated in an ordinary bed (e.g. pain scale).  Patient also requires protection from further skin break down that cannot be accommodated in an ordinary bed. She also has chronic G-tube feeding due to dysphagia a condition that requires the head of the bed to be elevated more than 30 degrees most of the time,  where pillows or wedges does not meet the patient s needs       Documentation of need for Rene Lift   The patient require the assistance of two persons in order to transfer between wheelchair, bed, commode. Without the use of the Rene lift, the patient will be bed confined.   The patient is unable to be transferred without a Rene lift due to  severe weakness, and lower extremity amputation. One caregiver cannot physically transfer pt. Yes, the lift will fit into all necessary areas in the patient s home       Diet: Adult Formula Drip Feeding: Continuous [facility] Renal Support; Gastrostomy; Goal Rate: 65; mL/hr; From: 6:00 PM; To: 6:00 AM; 3/31: Decrease cycle time from 14 hrs to 12 hrs (6:00 pm to 6:00 am)  Renal Diet (dialysis)    DVT Prophylaxis: DOAC  Bradshaw Catheter: Not present  Lines: None     Cardiac Monitoring: ACTIVE order. Indication: Procedural area  Code Status: No CPR- Pre-arrest intubation OK          Clinically Significant Risk Factors         # Hyponatremia: Lowest Na = 134 mmol/L in last 2 days, will monitor as appropriate  #  "Hypochloremia: Lowest Cl = 94 mmol/L in last 2 days, will monitor as appropriate   # Hypercalcemia: corrected calcium is >10.1, will monitor as appropriate    # Hypoalbuminemia: Lowest albumin = 3.1 g/dL at 4/7/2025  6:41 AM, will monitor as appropriate     # Hypertension: Noted on problem list  # Chronic heart failure with preserved ejection fraction: heart failure noted on problem list and last echo with EF >50%            # DMII: A1C = 6.5 % (Ref range: <5.7 %) within past 6 months   # Obesity: Estimated body mass index is 31.92 kg/m  as calculated from the following:    Height as of this encounter: 1.676 m (5' 6\").    Weight as of this encounter: 89.7 kg (197 lb 12 oz).      # Financial/Environmental Concerns:           Social Drivers of Health       Tobacco Use: Medium Risk (3/30/2025)     Patient History      Smoking Tobacco Use: Former      Smokeless Tobacco Use: Never   Physical Activity: Insufficiently Active (7/19/2024)     Exercise Vital Sign      Days of Exercise per Week: 1 day      Minutes of Exercise per Session: 10 min   Social Connections: Unknown (7/19/2024)     Social Connection and Isolation Panel [NHANES]      Frequency of Social Gatherings with Friends and Family: More than three times a week        Disposition Plan  -- home with home care     Medically Ready for Discharge:   -- anticipate on 4/11    Disposition:     Interval History   -- chart reviewed  -- discussed with RN  -- patient with ongoing anxiety, getting atarax  -- has ongoing SOB sensation despite normal oxygen saturation  -- discussion with daughter and palliative care    -Data reviewed today: I reviewed all new labs and imaging over the last 24 hours. I personally reviewed no images or EKG's today.    Physical Exam    , Blood pressure (!) 160/53, pulse 77, temperature 97.8  F (36.6  C), temperature source Oral, resp. rate 18, height 1.676 m (5' 6\"), weight 89.7 kg (197 lb 12 oz), SpO2 93%, not currently breastfeeding.  Vitals:    " 04/02/25 0600 04/02/25 1445 04/08/25 0605   Weight: 93.8 kg (206 lb 14.4 oz) 98.1 kg (216 lb 4.3 oz) 89.7 kg (197 lb 12 oz)     Vital Signs with Ranges  Temp:  [97.3  F (36.3  C)-98.2  F (36.8  C)] 97.8  F (36.6  C)  Pulse:  [61-77] 77  Resp:  [18] 18  BP: (125-175)/(31-98) 160/53  FiO2 (%):  [30 %] 30 %  SpO2:  [90 %-100 %] 93 %  I/O's Last 24 hours  I/O last 3 completed shifts:  In: 2261 [P.O.:300; NG/GT:1215]  Out: -     Constitutional: Anxious, on FM,   Respiratory: Diminished at the bases  Cardiovascular: Regular rate and rhythm, normal S1 and S2, and no murmur noted  GI: Normal bowel sounds, soft, non-distended, non-tender  Skin/Integumen: No rashes, no cyanosis, no edema  Other:      Medications   All medications were reviewed.  Current Facility-Administered Medications   Medication Dose Route Frequency Provider Last Rate Last Admin    dextrose 10% infusion   Intravenous Continuous PRN Ekaterina Gayle MD        heparin 10,000 units/10 mL infusion (DIALYSIS USE)  500 Units/hr Hemodialysis Machine Continuous Dusty Prieto MD   Stopped at 04/07/25 0950    May take oral meds with a sip of water, the morning of SHAHNAZ procedure.   Does not apply Continuous PRN Ganesh Lawson MD        Stop Heparin 60 minutes before end of treatment   Does not apply Continuous PRN Dusty Prieto MD         Current Facility-Administered Medications   Medication Dose Route Frequency Provider Last Rate Last Admin    - MEDICATION INSTRUCTIONS for Dialysis Patients -   Does not apply See Admin Instructions Ekaterina Gayle MD        amiodarone (PACERONE) tablet 200 mg  200 mg Oral or Feeding Tube BID Ekaterina Gayle MD   200 mg at 04/09/25 0940    amLODIPine (NORVASC) tablet 10 mg  10 mg Oral or Feeding Tube Once per day on Sunday Tuesday Thursday Saturday Ekaterina Gayle MD   10 mg at 04/08/25 1025    apixaban ANTICOAGULANT (ELIQUIS) tablet 5 mg  5 mg Oral or Feeding Tube BID Ekaterina Gayle MD   5 mg at 04/09/25  0941    atorvastatin (LIPITOR) tablet 20 mg  20 mg Oral or Feeding Tube QPM Ekaterina Gayle MD   20 mg at 04/08/25 2103    calcitRIOL (ROCALTROL) capsule 0.25 mcg  0.25 mcg Oral Once per day on Monday Wednesday Friday Ekaterina Gayle MD   0.25 mcg at 04/07/25 1330    cetirizine (zyrTEC) tablet 10 mg  10 mg Oral or Feeding Tube At Bedtime Ekaterina Gayle MD   10 mg at 04/08/25 2103    hydrALAZINE (APRESOLINE) tablet 75 mg  75 mg Oral or Feeding Tube Q8H Ekaterina Gayle MD   75 mg at 04/09/25 0456    insulin aspart (NovoLOG) injection (RAPID ACTING)  1-7 Units Subcutaneous TID  Blaise Holden MD   2 Units at 04/09/25 0942    insulin aspart (NovoLOG) injection (RAPID ACTING)  1-5 Units Subcutaneous At Bedtime Blaise Holden MD        insulin glargine (LANTUS PEN) injection 12 Units  12 Units Subcutaneous BID Ekaterina Gayle MD   12 Units at 04/09/25 0941    midodrine (PROAMATINE) tablet 5 mg  5 mg Oral Once per day on Monday Wednesday Friday Ekaterina Gayle MD   5 mg at 04/09/25 1342    No heparin via hemodialysis machine   Does not apply Once Alysia Roman MD        pantoprazole (PROTONIX) 2 mg/mL suspension 40 mg  40 mg Per Feeding Tube QAM AC Ekaterina Gayle MD   40 mg at 04/09/25 0939    polyethylene glycol (MIRALAX) Packet 17 g  17 g Oral or Feeding Tube BID Ekaterina Gayle MD   17 g at 04/09/25 0939    [Held by provider] Prosource TF20 ENfit Compatibl EN LIQD (PROSOURCE TF20) packet 60 mL  1 packet Per Feeding Tube Daily Ekaterina Gayle MD   60 mL at 03/31/25 1407    sennosides (SENOKOT) tablet 2 tablet  2 tablet Oral BID Blaise Holden MD   2 tablet at 04/09/25 0939    sertraline (ZOLOFT) tablet 75 mg  75 mg Oral or Feeding Tube Daily Ekaterina Gayle MD   75 mg at 04/09/25 0940    sevelamer carbonate (RENVELA) tablet 1,600 mg  1,600 mg Oral TID w/meals Ekaternia Gayle MD   1,600 mg at 04/08/25 1835    sodium chloride (PF) 0.9% PF flush 3  mL  3 mL Intravenous Q8H Ganesh Lawson MD   3 mL at 04/09/25 1346    sodium chloride (PF) 0.9% PF flush 3 mL  3 mL Intracatheter Q8H [facility] Ekaterina Gayle MD   3 mL at 04/08/25 2105    sodium chloride 0.9% BOLUS 200 mL  200 mL Hemodialysis Machine Once Alysia Roman MD        sodium chloride 0.9% BOLUS 200 mL  200 mL Hemodialysis Machine Once Dusty Prieto MD        sodium chloride 0.9% BOLUS 250 mL  250 mL Intravenous Once in dialysis/CRRT Alysia Roman MD        sodium chloride 0.9% BOLUS 250 mL  250 mL Intravenous Once in dialysis/CRRT Dusty Prieto MD        sodium chloride 0.9% BOLUS 500 mL  500 mL Hemodialysis Machine Once Alysia Roman MD        sodium chloride 0.9% BOLUS 500 mL  500 mL Hemodialysis Machine Once Dusty Prieto MD        vancomycin (VANCOCIN) 1,000 mg in 200 mL dextrose intermittent infusion  1,000 mg Intravenous Once Ekaterina Gayle MD        vancomycin place cardoso - receiving intermittent dosing  1 each Intravenous See Admin Instructions Ekaterina Gayle MD        vitamin D3 (CHOLECALCIFEROL) tablet 50 mcg  50 mcg Oral or Feeding Tube Daily Ekaterina Gayle MD   50 mcg at 04/09/25 0940        Data   Recent Labs   Lab 04/09/25  1309 04/09/25  1114 04/09/25  0813 04/09/25  0134 04/08/25  0838 04/08/25  0544 04/07/25  0806 04/07/25  0641 04/05/25  1137 04/05/25  0607 04/04/25  1205 04/04/25  0629   WBC  --   --   --   --   --  7.1  --   --   --  5.7  --  8.6   HGB 7.6*  --   --   --   --  7.7*  --  7.5*  --  8.1*  --  7.8*   MCV  --   --   --   --   --  95  --   --   --  97  --  95   PLT  --   --   --   --   --  199  --   --   --  208  --  191   NA  --   --   --   --   --   --   --  134*  --  136  --  131*   POTASSIUM 4.4  --   --   --   --   --   --  4.7  --  3.9  --  4.2   CHLORIDE  --   --   --   --   --   --   --  94*  --  96*  --  92*   CO2  --   --   --   --   --   --   --  29  --  29  --  29   BUN  --   --   --   --   --   --   --  77.0*  --   27.3*  --  56.8*   CR  --   --   --   --   --   --   --  4.92*  --  2.51*  --  3.83*   ANIONGAP  --   --   --   --   --   --   --  11  --  11  --  10   JODY  --   --   --   --   --   --   --  9.9  --  10.1  --  10.3   GLC  --  137* 212* 159*   < >  --    < > 229*   < > 178*   < > 214*   ALBUMIN  --   --   --   --   --   --   --  3.1*  --   --   --   --     < > = values in this interval not displayed.       No results found for this or any previous visit (from the past 24 hours).    Owen Andrade MD  Text Page  (7am to 6pm)

## 2025-04-09 NOTE — PLAN OF CARE
A&O x3-4, confused about time occasionally. Anxious, discussed with Dr. Holden, PRN atarax given x1- seemed to help in combination with getting up to chair, change from NC to oxymask + having BM after constipation.     VSS on 3L O2 NC or oxymask. Intermittent cpap - all morning pt frequently requested to be placed back on cpap feeling as if she could not breathe despite sats stable %, suspect anxiety related.   Tele: SR with ST abnormality.   Up with lift.   LS course, diminished.   BS+. Flatus+, BM+ large today after scheduled + PRN bowel meds. Oliguric on HD. Scant pink tinged vaginal discharge with incontinence cares.   Poor appetite. Tried a few bites of food this evening with encouragement.   Nocturnal TF started at 1830  Plan for HD tomorrow.

## 2025-04-09 NOTE — PHARMACY-VANCOMYCIN DOSING SERVICE
Pharmacy Vancomycin Note  Date of Service 2025  Patient's  1950   75 year old, female    Indication: Bacteremia  Day of Therapy: 13  Current vancomycin regimen:  intermittent dosing for dialysis mg   Current vancomycin monitoring method: Renal Replacement Therapy  Current vancomycin therapeutic monitoring goal: 15-20 mg/L    Current estimated CrCl = Estimated Creatinine Clearance: 11 mL/min (A) (based on SCr of 4.92 mg/dL (H)).    Creatinine for last 3 days  2025:  6:41 AM Creatinine 4.92 mg/dL    Recent Vancomycin Levels (past 3 days)  2025:  6:41 AM Vancomycin 25.0 ug/mL  2025:  5:29 AM Vancomycin 23.4 ug/mL    Vancomycin IV Administrations (past 72 hours)                     vancomycin (VANCOCIN) 1,000 mg in 200 mL dextrose intermittent infusion (mg) 1,000 mg New Bag 25 1714                    Nephrotoxins and other renal medications (From now, onward)      Start     Dose/Rate Route Frequency Ordered Stop    25 1400  vancomycin (VANCOCIN) 1,000 mg in 200 mL dextrose intermittent infusion         1,000 mg  200 mL/hr over 1 Hours Intravenous ONCE 25 1319      25 1003  vancomycin place cardoso - receiving intermittent dosing         1 each Intravenous SEE ADMIN INSTRUCTIONS 25 1003                 Contrast Orders - past 72 hours (72h ago, onward)      None            Interpretation of levels and current regimen:    Renal Function: ESRD on Dialysis    Plan:  Dose 10 mg/kg (1000mg) after dialysis today   Vancomycin monitoring method: Renal Replacement Therapy  Vancomycin therapeutic monitoring goal: 15-20 mg/L  Pharmacy will check vancomycin levels as appropriate in  prior to next HD .  Serum creatinine levels will be ordered  per provider for HD .    Dipika Martin, [facility]

## 2025-04-10 ENCOUNTER — PATIENT OUTREACH (OUTPATIENT)
Dept: GERIATRIC MEDICINE | Facility: CLINIC | Age: 75
End: 2025-04-10
Payer: COMMERCIAL

## 2025-04-10 ENCOUNTER — APPOINTMENT (OUTPATIENT)
Dept: ULTRASOUND IMAGING | Facility: CLINIC | Age: 75
End: 2025-04-10
Attending: INTERNAL MEDICINE
Payer: COMMERCIAL

## 2025-04-10 LAB
% LINING CELLS, BODY FLUID: 14 %
ANION GAP SERPL CALCULATED.3IONS-SCNC: 9 MMOL/L (ref 7–15)
APPEARANCE FLD: ABNORMAL
BASOPHILS NFR FLD MANUAL: 1 %
BUN SERPL-MCNC: 33.5 MG/DL (ref 8–23)
CALCIUM SERPL-MCNC: 9.8 MG/DL (ref 8.8–10.4)
CHLORIDE SERPL-SCNC: 93 MMOL/L (ref 98–107)
COLOR FLD: ABNORMAL
CREAT SERPL-MCNC: 2.52 MG/DL (ref 0.51–0.95)
EGFRCR SERPLBLD CKD-EPI 2021: 19 ML/MIN/1.73M2
ERYTHROCYTE [DISTWIDTH] IN BLOOD BY AUTOMATED COUNT: 17.6 % (ref 10–15)
GLUCOSE BLDC GLUCOMTR-MCNC: 104 MG/DL (ref 70–99)
GLUCOSE BLDC GLUCOMTR-MCNC: 148 MG/DL (ref 70–99)
GLUCOSE BLDC GLUCOMTR-MCNC: 159 MG/DL (ref 70–99)
GLUCOSE BLDC GLUCOMTR-MCNC: 172 MG/DL (ref 70–99)
GLUCOSE BLDC GLUCOMTR-MCNC: 99 MG/DL (ref 70–99)
GLUCOSE SERPL-MCNC: 150 MG/DL (ref 70–99)
GRAM STAIN RESULT: NORMAL
GRAM STAIN RESULT: NORMAL
HCO3 SERPL-SCNC: 30 MMOL/L (ref 22–29)
HCT VFR BLD AUTO: 27.8 % (ref 35–47)
HGB BLD-MCNC: 8.7 G/DL (ref 11.7–15.7)
LDH SERPL L TO P-CCNC: 204 U/L (ref 0–250)
LYMPHOCYTES NFR FLD MANUAL: 49 %
MCH RBC QN AUTO: 28.3 PG (ref 26.5–33)
MCHC RBC AUTO-ENTMCNC: 31.3 G/DL (ref 31.5–36.5)
MCV RBC AUTO: 91 FL (ref 78–100)
MONOS+MACROS NFR FLD MANUAL: 33 %
NEUTS BAND NFR FLD MANUAL: 3 %
PLATELET # BLD AUTO: 205 10E3/UL (ref 150–450)
POTASSIUM SERPL-SCNC: 3.6 MMOL/L (ref 3.4–5.3)
PROT FLD-MCNC: 2.3 G/DL
PROT SERPL-MCNC: 6 G/DL (ref 6.4–8.3)
PROTEIN BODY FLUID SOURCE: NORMAL
RBC # BLD AUTO: 3.07 10E6/UL (ref 3.8–5.2)
SODIUM SERPL-SCNC: 132 MMOL/L (ref 135–145)
SPECIMEN SOURCE FLD: ABNORMAL
WBC # BLD AUTO: 5.5 10E3/UL (ref 4–11)
WBC # FLD AUTO: 475 /UL

## 2025-04-10 PROCEDURE — 250N000009 HC RX 250: Performed by: RADIOLOGY

## 2025-04-10 PROCEDURE — 272N000706 US THORACENTESIS

## 2025-04-10 PROCEDURE — 94799 UNLISTED PULMONARY SVC/PX: CPT

## 2025-04-10 PROCEDURE — 250N000013 HC RX MED GY IP 250 OP 250 PS 637: Performed by: INTERNAL MEDICINE

## 2025-04-10 PROCEDURE — 36415 COLL VENOUS BLD VENIPUNCTURE: CPT | Performed by: INTERNAL MEDICINE

## 2025-04-10 PROCEDURE — 999N000156 HC STATISTIC RCP CONSULT EA 30 MIN

## 2025-04-10 PROCEDURE — 999N000157 HC STATISTIC RCP TIME EA 10 MIN

## 2025-04-10 PROCEDURE — 80048 BASIC METABOLIC PNL TOTAL CA: CPT | Performed by: INTERNAL MEDICINE

## 2025-04-10 PROCEDURE — 83615 LACTATE (LD) (LDH) ENZYME: CPT | Performed by: INTERNAL MEDICINE

## 2025-04-10 PROCEDURE — 84157 ASSAY OF PROTEIN OTHER: CPT | Performed by: INTERNAL MEDICINE

## 2025-04-10 PROCEDURE — 85027 COMPLETE CBC AUTOMATED: CPT | Performed by: INTERNAL MEDICINE

## 2025-04-10 PROCEDURE — 0W9B3ZZ DRAINAGE OF LEFT PLEURAL CAVITY, PERCUTANEOUS APPROACH: ICD-10-PCS | Performed by: STUDENT IN AN ORGANIZED HEALTH CARE EDUCATION/TRAINING PROGRAM

## 2025-04-10 PROCEDURE — 99233 SBSQ HOSP IP/OBS HIGH 50: CPT | Performed by: INTERNAL MEDICINE

## 2025-04-10 PROCEDURE — 89050 BODY FLUID CELL COUNT: CPT | Performed by: INTERNAL MEDICINE

## 2025-04-10 PROCEDURE — 94640 AIRWAY INHALATION TREATMENT: CPT | Mod: 76

## 2025-04-10 PROCEDURE — 94660 CPAP INITIATION&MGMT: CPT

## 2025-04-10 PROCEDURE — 250N000009 HC RX 250: Performed by: INTERNAL MEDICINE

## 2025-04-10 PROCEDURE — 999N000128 HC STATISTIC PERIPHERAL IV START W/O US GUIDANCE

## 2025-04-10 PROCEDURE — 94640 AIRWAY INHALATION TREATMENT: CPT

## 2025-04-10 PROCEDURE — 120N000001 HC R&B MED SURG/OB

## 2025-04-10 PROCEDURE — 87070 CULTURE OTHR SPECIMN AEROBIC: CPT | Performed by: INTERNAL MEDICINE

## 2025-04-10 PROCEDURE — 250N000013 HC RX MED GY IP 250 OP 250 PS 637: Performed by: HOSPITALIST

## 2025-04-10 PROCEDURE — 84155 ASSAY OF PROTEIN SERUM: CPT | Performed by: INTERNAL MEDICINE

## 2025-04-10 PROCEDURE — 250N000013 HC RX MED GY IP 250 OP 250 PS 637: Performed by: STUDENT IN AN ORGANIZED HEALTH CARE EDUCATION/TRAINING PROGRAM

## 2025-04-10 PROCEDURE — 99232 SBSQ HOSP IP/OBS MODERATE 35: CPT | Performed by: REGISTERED NURSE

## 2025-04-10 RX ORDER — HYDROMORPHONE HCL IN WATER/PF 6 MG/30 ML
0.2 PATIENT CONTROLLED ANALGESIA SYRINGE INTRAVENOUS EVERY 4 HOURS PRN
Status: DISCONTINUED | OUTPATIENT
Start: 2025-04-10 | End: 2025-04-10

## 2025-04-10 RX ORDER — HYDROMORPHONE HCL IN WATER/PF 6 MG/30 ML
0.2 PATIENT CONTROLLED ANALGESIA SYRINGE INTRAVENOUS EVERY 6 HOURS PRN
Status: DISCONTINUED | OUTPATIENT
Start: 2025-04-10 | End: 2025-04-17 | Stop reason: HOSPADM

## 2025-04-10 RX ORDER — LIDOCAINE HYDROCHLORIDE 10 MG/ML
10 INJECTION, SOLUTION EPIDURAL; INFILTRATION; INTRACAUDAL; PERINEURAL ONCE
Status: COMPLETED | OUTPATIENT
Start: 2025-04-10 | End: 2025-04-10

## 2025-04-10 RX ORDER — IPRATROPIUM BROMIDE AND ALBUTEROL SULFATE 2.5; .5 MG/3ML; MG/3ML
3 SOLUTION RESPIRATORY (INHALATION)
Status: DISCONTINUED | OUTPATIENT
Start: 2025-04-10 | End: 2025-04-17 | Stop reason: HOSPADM

## 2025-04-10 RX ADMIN — SENNOSIDES 2 TABLET: 8.6 TABLET ORAL at 08:40

## 2025-04-10 RX ADMIN — HYDRALAZINE HYDROCHLORIDE 75 MG: 25 TABLET ORAL at 05:17

## 2025-04-10 RX ADMIN — LIDOCAINE HYDROCHLORIDE ANHYDROUS 10 ML: 10 INJECTION, SOLUTION INFILTRATION at 15:09

## 2025-04-10 RX ADMIN — ATORVASTATIN CALCIUM 20 MG: 20 TABLET, FILM COATED ORAL at 20:54

## 2025-04-10 RX ADMIN — HYDROXYZINE HYDROCHLORIDE 25 MG: 25 TABLET ORAL at 05:17

## 2025-04-10 RX ADMIN — HYDROXYZINE HYDROCHLORIDE 25 MG: 25 TABLET ORAL at 11:28

## 2025-04-10 RX ADMIN — IPRATROPIUM BROMIDE AND ALBUTEROL SULFATE 3 ML: .5; 3 SOLUTION RESPIRATORY (INHALATION) at 13:38

## 2025-04-10 RX ADMIN — APIXABAN 5 MG: 5 TABLET, FILM COATED ORAL at 20:54

## 2025-04-10 RX ADMIN — Medication 50 MCG: at 08:40

## 2025-04-10 RX ADMIN — AMIODARONE HYDROCHLORIDE 200 MG: 200 TABLET ORAL at 08:40

## 2025-04-10 RX ADMIN — IPRATROPIUM BROMIDE AND ALBUTEROL SULFATE 3 ML: .5; 3 SOLUTION RESPIRATORY (INHALATION) at 19:30

## 2025-04-10 RX ADMIN — APIXABAN 5 MG: 5 TABLET, FILM COATED ORAL at 08:40

## 2025-04-10 RX ADMIN — HYDRALAZINE HYDROCHLORIDE 75 MG: 25 TABLET ORAL at 13:18

## 2025-04-10 RX ADMIN — AMLODIPINE BESYLATE 10 MG: 10 TABLET ORAL at 08:40

## 2025-04-10 RX ADMIN — SENNOSIDES 2 TABLET: 8.6 TABLET ORAL at 20:54

## 2025-04-10 RX ADMIN — INSULIN GLARGINE 12 UNITS: 100 INJECTION, SOLUTION SUBCUTANEOUS at 08:48

## 2025-04-10 RX ADMIN — CETIRIZINE HYDROCHLORIDE 10 MG: 10 TABLET, FILM COATED ORAL at 20:54

## 2025-04-10 RX ADMIN — Medication 40 MG: at 08:40

## 2025-04-10 RX ADMIN — HYDROXYZINE HYDROCHLORIDE 25 MG: 25 TABLET ORAL at 22:17

## 2025-04-10 RX ADMIN — INSULIN GLARGINE 12 UNITS: 100 INJECTION, SOLUTION SUBCUTANEOUS at 20:54

## 2025-04-10 RX ADMIN — SERTRALINE HYDROCHLORIDE 75 MG: 50 TABLET ORAL at 08:40

## 2025-04-10 RX ADMIN — HYDRALAZINE HYDROCHLORIDE 75 MG: 25 TABLET ORAL at 20:54

## 2025-04-10 RX ADMIN — POLYETHYLENE GLYCOL 3350 17 G: 17 POWDER, FOR SOLUTION ORAL at 08:40

## 2025-04-10 RX ADMIN — IPRATROPIUM BROMIDE AND ALBUTEROL SULFATE 3 ML: .5; 3 SOLUTION RESPIRATORY (INHALATION) at 16:10

## 2025-04-10 ASSESSMENT — ACTIVITIES OF DAILY LIVING (ADL)
ADLS_ACUITY_SCORE: 89
ADLS_ACUITY_SCORE: 84
ADLS_ACUITY_SCORE: 90
ADLS_ACUITY_SCORE: 90
ADLS_ACUITY_SCORE: 84
ADLS_ACUITY_SCORE: 90
ADLS_ACUITY_SCORE: 89
ADLS_ACUITY_SCORE: 90
ADLS_ACUITY_SCORE: 90
ADLS_ACUITY_SCORE: 84
ADLS_ACUITY_SCORE: 90
ADLS_ACUITY_SCORE: 84
ADLS_ACUITY_SCORE: 90
ADLS_ACUITY_SCORE: 90
ADLS_ACUITY_SCORE: 89
ADLS_ACUITY_SCORE: 90
ADLS_ACUITY_SCORE: 89

## 2025-04-10 NOTE — PROGRESS NOTES
04/10/25 1340   RCAT Assessment   Reason for Assessment Other (see comments)  (History of acute respiratory failure and pneumonia)   Pulmonary Status 3   Surgical Status 0   Chest X-ray 4   Respiratory Pattern 0   Mental Status 0   Breath Sounds 3   Cough Effectiveness 0   Level of Activity 2   O2 Required for SpO2>=92% 1   Acuity Level (points) 13   Acuity Level  3   Re-eval Interval Guideline Every 3 days   Re-evaluation Date 04/13/25   $RT Consult Time Spent RCAT (30 minute increments) 2   Clinical Indications/Symptoms   Aerosol Therapy RCAT protocol   Broncho-pulmonary Hygiene Rhonchi on auscultation;Productive cough   Volume Expansion Atelectasis   Aerosol Therapy Plan   RT Treatment Nebulizer   Anticholinergic/Beta-Andrenergic Agonist Duoneb soln (0.5mg/3mg per 3mL) neb Max 6 doses/24h   Aerosol Treatment Frequency Acuity Level 3: QID/PRN @noc-Mod wheezing/Hx asthma/secretion removal   Aerosol Therapy (SVN)   Daily Review of Necessity (SVN) completed   Respiratory Treatment Status (SVN) given   Patient Position semi-Amaya's   Posttreatment Assessment (SVN) breath sounds improved   Signs of Intolerance (SVN) none   Broncho-Pulmonary Hygiene Plan   Broncho-Pulmonary Hygiene Treatment OPEP therapy   Broncho-Pulm Hygiene Frequency Acuity Level 2 : QID & PRN @noc-Moderate amounts of secretions   Volume Expansion Plan   Volume Expansion Treatment Bipap Treatment per MD order;CPAP/OPEP/Flutter Valve;Incentive Spirometer   Volume Expansion Frequency Acuity Level 2 : QID-High risk for/with persistent atelectasis   Breath Sounds   Breath Sounds All Fields   All Lung Fields Breath Sounds Anterior:;Lateral:;diminished;coarse     RCAT Assessment completed per physician order and patient assessment, per RCAT protocol, patient will be started on Flutter valve Chest Physiotherapy QID, with deep breathing and triana coughing techniques alongside Nebulizer therapy, RT to continue to follow and re-assess per RCAT protocol

## 2025-04-10 NOTE — PROGRESS NOTES
[id number]  Orientations: A&Ox3-4. Slightly anxious this morning, given prn atarax. Calm and cooperative after. Meds via G tube.  Vitals/Pain: VSS on 3 L NC. RT following. Denies pain.   Tele: n/a  Lines/Drains: Lost IV access- okay to stay out per MD Andrade.   Skin/Wounds: L AVF, scattered bruising, tears, and abrasions. L buttock abrasion form old PI mepilex in place.   GI/: incontinent BM x1. - urine output, on HD. Lacks appetite- renal diet with NOC TF at 65 ml /hr  Labs: Abnormal/Trends, Electrolyte Replacement- n/a  Ambulation/Assist: x2 lift. Turn and repo.   Plan: care conference today- continue new interventions and reassess on Monday. 150 ml via thoracentesis today. HD MWF. Continue plan of care.

## 2025-04-10 NOTE — PLAN OF CARE
"1900-0730    Orientations: AxOx3, disoriented to situation. Anxious and restless.   Vitals/Pain: VSS on 3-5L oxymask / CPAP 3-5L bleed in / BIPAP 30% except HTN. Denies pain.   Lines/Drains: PIV x1 SL. LUE fistula, dressing CDI.  Skin/Wounds: Scattered bruises. Skin tear/scab to R forearm, healing abrasion to L buttock, peeling to R foot.  GI/: Renal diet, low PO tolerance; running TF overnight (started late due to late dialysis-- will be complete ~0900). Anuric, on HD. BM x2, incontinent-- one stool had blood mixed in, however was not bloody on the wipe, so possible bloody urine?  Labs: Trending Hgb: 7.6 --> 8.7. , 148.   Ambulation/Assist: Assist x2 with lift; turn and repo q2h.  Sleep Quality: Sleeping intermittently.    Plan: Anxiety management, reassurance. Ongoing IV abx. Tentative discharge home in the next few days pending medical equipment/home O2.    Events: Pt very anxious overnight on CPAP. Complains of shortness of breath and \"not enough oxygen\" on both CPAP and oxymask with 5L. Soothes with reassurance and then anxiety recurs ~10 minutes later. PRN Atarax given x2, Seroquel not given due to jittery/tremors that were not present prior.   Discussed with patient and daughter that we could try medication to help with air hunger, however would be more palliative and therefore should be discussed as part of goals-of-care with providers. Pt and daughter agreeable-- requested to retry BIPAP first, then possibly additional medications. Pt rested better on the BIPAP.  "

## 2025-04-10 NOTE — PROGRESS NOTES
[facility] Care Coordination Contact    Dear Provider:     Your patient requires a Certificate of Need (CON) form to be completed in order to obtain special transportation services, or door to door assistance, with [employer] [id number].    [employer] requires providers to complete this form on line within 30 days.  The new form is completely interactive, imbedded in the website in a secure portal, fully HIPAA compliant, and the data is encrypted when submitted to [employer].      Please be prepared with the following information:  Member Group #: [id number]  Member ID #:  [id number]    As well as transportation mode needed (amblatory with assistance, wheelchair, or stretcher), diagnosis codes, and how the members health condition requires special transportation.    [employer] CON Form    Please follow instructions and complete the form in full before submitting.      Thank you,    Leslie Braun  Care Management Specialist   [facility]   [phone]

## 2025-04-10 NOTE — PROVIDER NOTIFICATION
"   04/10/25 0347   Tech Time   $Tech Time (10 minute increments) 4   Mode: CPAP/ BiPAP/ AVAPS/ AVAPS AE   CPAP/BiPAP/ AVAPS/ AVAPS AE Mode BiPAP S/T   Equipment   Device v60   CPAP/BiPAP/Settings   $CPAP/BiPAP Subsequent completed   BIPAP/CPAP On Standby On   IPAP/EPAP (cmH2O) 14/7   Rate (breaths/min) 12   Oxygen (%) 30   Timed Inspiration (sec) 0.9   IPAP RISE  Settings (V60) 2   CPAP/BiPAP Patient Parameters   IPAP (cm H2O) 14 cmH2O   EPAP (cm H2O) 7 cmH2O   RR Total (breaths/min) 32 breaths/min   Vt (mL) 354 mL   Minute Ventilation (L/min) 12.4 L/min   Peak Inspiratory Pressure (cm H2O) 15 cmH2O   Pt.  Leak (L/min) 13 L/min   CPAP/BiPAP/AVAPS/AVAPS AE Alarms   High Pressure (cm H2O) 25 cmH2O   Low Pressure (cm H2O) 5   Lo Min Vent 2   High Rate (breaths/min) 45 breaths/min   Low Rate (breaths/min) 5   Audible Alarm set at (Volume of Alarm) 10   Humidifier Checked N/A     Called to bedside by RN. Patient is anxious and had not slept. Stated she wanted the bigger mask and felt her breathes were \"not big enough.\" Patient is tachypneic. Will try BiPAP to see if patient can feel relief with breathing. Patient called daughter while in room and daughter stated she felt patient did better on BiPAP.  "

## 2025-04-10 NOTE — PROGRESS NOTES
"[facility]  Hospitalist Progress Note  Owen Andrade MD  04/10/2025    Assessment & Plan   Supriya Herr is a 75 year old female with very complex past medical history including end-stage renal disease on hemodialysis, HFpEF, paroxysmal atrial fibrillation, hypertension, hyperlipidemia, anxiety, depression, chronic anemia, dysphagia status post PEG tube, type 2 diabetes, left traumatic AKA and recent hospitalization with multifactorial respiratory failure now being admitted on 3/27/2025 with abdominal pain and retching. She is found to have e/o constipation. But other evaluation notable for elevated WBC, procal and CRP of unclear etiology.      Pf note, pt with multiple recent hospitalizations. Per last discharge summary:   \"[facility]25 with shortness of breath likely multifactorial.  Volume status was difficult to determine, underwent right heart cath on 3/5 showed mildly elevated right atrial and PCWP readings.  Was treated for acute respiratory failure likely from heart failure exacerbation, atrial fibrillation, end-stage renal disease and discharged to care facility with supplemental nocturnal oxygen.\"  \"Prol[facility]ed [facility] from 1/[facility] to 2/26/2025 for acute hypoxic respiratory failure multifactorial, was intubated ( 1/9-1/18), reintubated (1/20 - 1/25).  Then was treated for shock with pressors, influenza A pneumonia, hospital-acquired pneumonia with intravenous antibiotics, steroids, antivirals.  Then also noted to have heart failure exacerbation, recurrent A-fib with RVR, subsequent bradycardia.  Then also noted to have encephalopathy likely from infectious, metabolic etiology and delirium.  During that hospitalization was noted to have dysphagia, pharyngeal edema, was evaluated by ENT on 2/16.  No findings to explain dysphagia.  G-tube was placed by IR on 2/17 and was on nocturnal tube feeds.  Discharged to TCU for rehabilitation.\"   "   Dispo: Pt's daughter prefers her to return home rather than TCU. Pt will need a number of home medical supplies prior to being able to go home. These have been ordered in the discharge navigator. It may take some time for them to be delivered to her home. Appreciate social work and care coordinator help in arranging this. Pt's daughter will also need teaching on how to do tube feedings. IV antibiotics should be arranged through nephrology so she can get them with dialysis.         *4/6 - 4/8 patient had/has been fairly optimized in general, however despite objectively having good O2 sats and CXR 4/6 unremarkable, feels SOB at times and has wanted mask or NC in place. Blood gas repeats compensated. Suspect some degree of anxiety. Updated dtr on phone.   *SW working on obtaining DME for home as patient and dtr hoping for home as above.   - Continue close monitoring and regimen below     Concern of acute hypoxic respiratory failure though mostly normal O2 sats and blood gas fairly bland  *CXRs 4/5 without new overt findings  - O2 as needed, wean as able, redirect and re-assure     E. Faecalis bacteremia  Leukocytosis  Elevated procalcitonin   Elevated CRP  *On admission, WBC elevated to 17.4, Procal elevated to 1.3, and CRP elevated to 39.78.   Unclear infectious source. She has some mild non-specific urinary bladder wall thickening, but with ESRD makes very little urine. Also has a new MIKAYLA 3mm nodule which may be infectious or inflammatory, but no respiratory complaints.   * COVID, flu RSV negative at admission   *UA very abnormal but difficult to interpret in the setting of ESRD. Ultimately Ucx positive for E faecalis. Could be initial source or seeding from bacteremia.  *Bcx positive for E. Faecalis. Unclear source. Concern for endocarditis given how quickly blood cultures are coming back positive  *TTE without vegetation seen.  *TTE completed 4/1 without concern for mass or vegetation.   - ID consulted for  assistance               - Continue IV vancomycin               - Repeat Bcx until cleared, no growth since 3/29               - Per ID, plan to complete 6 week course with IV vancomycin - last dose 5/10  - IV antibiotics should be arranged through nephrology/dialysis.   - WBC remains normal and CRP improving thru 4/8  - CT of chest 4/9 shows: trace interstitial edema, mod left pleural effusion, mild-mod bronchial wall thickening with associated mucus plugging, near complete RML collapse, 3 mm MIKAYLA pulmonary nodule that is new.  Repeat CT in 3 months for follow up of nodule and RML collapse  - start Duonebs Q 4 hours while awake, chest physiotherapy, pulmonary consultation, US guided left thoracentesis, will add IV solumedrol.     Abdominal pain - improved/resolved  Retching, with Hx of GERD   Dysphagia s/p PEG tube (2/1/2025)   Constipation, rectal stool ball   Pt presents to the ED with vague complaints of retching/dry heaving without vomiting and some crampy lower abdominal pain.   * CT scan showed large rectal stool ball without evidence of obstruction. There was also some non-specific mild urinary bladder wall thickening. No other intra-abdominal pathology.   - Unclear exactly what is going on. Pt did not have any retching or other symptoms while I was in the room. Perhaps she has some reflux of tube feeds or other esophageal issue.  - GI consulted for assessment:               - EGD on 3/30 without etiology to explain dysphagia               - XR Esophagram normal  - Continue Protonix 40 mg daily  - Continue bowel regimen to work on the constipation   - Zofran available PRN  * Prior to 4/6, pt still having these bouts of retching/coughing/dry heaving. Resolved lately?  - Will continue to monitor. No additional work-up needed currently.      End-stage renal disease on HD   Dialyzes MWF via L arm fistula. Per prior notes, volume removal has often been limited by intra dialytic hypotension and a fib with RVR    *Noted that patient has been getting short of breath on Sundays in the past and there was discussion of adding a fourth dialysis day on Saturdays. As of now, nephrology challenging dry weight to see if that helps.  - Nephrology consulted   - Continue PTA midodrine MWF before dialysis   - Continue PTA calcitriol, renvela, vit D.      Heart failure with preserved ejection fraction, chronic  Paroxysmal atrial fibrillation on chronic anticoagulation  Hypertension  Hyperlipidemia  Troponin elevation, Chronic Non-ischemic Myocardial Injury due to chronic heart failure with preserved ejection fraction and end-stage renal disease (85->85), no treatment indicated   [PTA medications include: amiodarone 200mg BID, amlodipine 10mg daily, Apixaban 5mg BID, atorvastatin 20mg daily, hydralazine 75mg Q8H,   *TTE 1/21/2025 with LVEF of 60%.  No valvular abnormality   * RHC 3/5/25 showing moderately elevated right and left-sided filling pressure; elevated wedge pressure consistent with pulmonary hypertension  * On admission now, pt has markedly elevated BNP to 51,544 (though historically is in the 20,000-40,000 range), She does have small pleural effusions on CT, but no other overt evidence of volume overload or clinical HF. She is satting well on room air.  - I/Os and daily weights ordered  - continue PTA apixaban 5 mg BID  - continue PTA atorvastatin 20 mg daily  - continue PTA hydralazine 75 mg q8H  - continue PTA amiodarone 200 mg change to daily as per discussion with [facility].   - continue PTA amlodipine 10 m qday  - Volume management per nephrology     Vaginal bleeding   Pt has had intermittent vaginal bleeding this stay. Nursing and pt quite convinced it is in fact vaginal rather than rectal or urethral.   - transvaginal ultrasound ordered for further eval negative for any concerning findings.   - Recommend outpatient Gyn follow-up      Anxiety/depression/insomnia  - Continue PTA zoloft      Anemia of chronic disease  Hemoglobin  9.2 on admission, has trended down since admission to 7.8.   - Hgb 7.7 > 7.6, given 1 unit with post transfusion hgb 8.7     Type II DM  - Continue PTA lantus 15 units BID   - MDSSI      Physical deconditioning from medical illness, senile frailty.  S/p left above-knee amputation, traumatic  Patient from TCU, daughter hopeful pt will be able to discharge back home with services rather than going back to TCU.   - PT/OT consulted   - Will plan for home discharge      Sacrococcygeal Pressure injury, Stage 3, present on admission  Wound care team followed.  Pressure injury prevention.      Obesity with a BMI of 34.  Increase all-cause mortality and morbidity.     Documentation for necessity of medical equipment      Patient needs a Semi Electric bed   Patient has Sacrococcygeal Pressure injury, Stage 3, present on admission and requires positioning of the body to alleviate pain, that cannot be accommodated in an ordinary bed (e.g. pain scale).  Patient also requires protection from further skin break down that cannot be accommodated in an ordinary bed. She also has chronic G-tube feeding due to dysphagia a condition that requires the head of the bed to be elevated more than 30 degrees most of the time,  where pillows or wedges does not meet the patient s needs       Documentation of need for Rene Lift   The patient require the assistance of two persons in order to transfer between wheelchair, bed, commode. Without the use of the Rene lift, the patient will be bed confined.   The patient is unable to be transferred without a Rene lift due to  severe weakness, and lower extremity amputation. One caregiver cannot physically transfer pt. Yes, the lift will fit into all necessary areas in the patient s home       Diet: Adult Formula Drip Feeding: Continuous [employer] Renal Support; Gastrostomy; Goal Rate: 65; mL/hr; From: 6:00 PM; To: 6:00 AM; 3/31: Decrease cycle time from 14 hrs to 12 hrs (6:00 pm to 6:00 am)  Renal  "Diet (dialysis)    DVT Prophylaxis: DOAC  Bradshaw Catheter: Not present  Lines: None     Cardiac Monitoring: ACTIVE order. Indication: Procedural area  Code Status: No CPR- Pre-arrest intubation OK          Clinically Significant Risk Factors         # Hyponatremia: Lowest Na = 134 mmol/L in last 2 days, will monitor as appropriate  # Hypochloremia: Lowest Cl = 94 mmol/L in last 2 days, will monitor as appropriate   # Hypercalcemia: corrected calcium is >10.1, will monitor as appropriate    # Hypoalbuminemia: Lowest albumin = 3.1 g/dL at 4/7/2025  6:41 AM, will monitor as appropriate     # Hypertension: Noted on problem list  # Chronic heart failure with preserved ejection fraction: heart failure noted on problem list and last echo with EF >50%            # DMII: A1C = 6.5 % (Ref range: <5.7 %) within past 6 months   # Obesity: Estimated body mass index is 31.92 kg/m  as calculated from the following:    Height as of this encounter: 1.676 m (5' 6\").    Weight as of this encounter: 89.7 kg (197 lb 12 oz).      # Financial/Environmental Concerns:           Disposition Plan  -- home with home care     Medically Ready for Discharge:   -- anticipate on 4/14    Disposition:     Interval History   -- chart reviewed  -- family conference today  -- continues to be anxious  -- CT scan shows new RML collapse, mucus plugging  -- received 1 unit of pRBC    -Data reviewed today: I reviewed all new labs and imaging over the last 24 hours. I personally reviewed no images or EKG's today.    Physical Exam    , Blood pressure (!) 144/65, pulse 67, temperature 98.1  F (36.7  C), temperature source Axillary, resp. rate 18, height 1.676 m (5' 6\"), weight 85.5 kg (188 lb 7.9 oz), SpO2 99%, not currently breastfeeding.  Vitals:    04/08/25 0605 04/09/25 1539 04/09/25 2000   Weight: 89.7 kg (197 lb 12 oz) 93 kg (205 lb 0.4 oz) 85.5 kg (188 lb 7.9 oz)     Vital Signs with Ranges  Temp:  [97.6  F (36.4  C)-98.2  F (36.8  C)] 98.1  F (36.7 "  C)  Pulse:  [62-76] 67  Resp:  [18-33] 18  BP: (109-181)/() 144/65  FiO2 (%):  [30 %] 30 %  SpO2:  [87 %-100 %] 99 %  I/O's Last 24 hours  I/O last 3 completed shifts:  In: 1859 [NG/GT:440; IV Piggyback:200]  Out: 3000 [Other:3000]    Constitutional: Chronically ill appearing  Respiratory: Diminished at the bases  Cardiovascular: Regular rate and rhythm, normal S1 and S2, and no murmur noted  GI: Normal bowel sounds, soft, non-distended, non-tender  Skin/Integumen: No rashes, no cyanosis, no edema  Other:      Medications   All medications were reviewed.  Current Facility-Administered Medications   Medication Dose Route Frequency Provider Last Rate Last Admin    dextrose 10% infusion   Intravenous Continuous PRN Ekaterina Gayle MD        May take oral meds with a sip of water, the morning of SHAHNAZ procedure.   Does not apply Continuous PRN Ganesh Lawson MD         Current Facility-Administered Medications   Medication Dose Route Frequency Provider Last Rate Last Admin    - MEDICATION INSTRUCTIONS for Dialysis Patients -   Does not apply See Admin Instructions Ekaterina Gayle MD        amiodarone (PACERONE) tablet 200 mg  200 mg Oral or Feeding Tube Daily Owen Andrade MD   200 mg at 04/10/25 0840    amLODIPine (NORVASC) tablet 10 mg  10 mg Oral or Feeding Tube Once per day on Sunday Tuesday Thursday Saturday Ekaterina Gayle MD   10 mg at 04/10/25 0840    apixaban ANTICOAGULANT (ELIQUIS) tablet 5 mg  5 mg Oral or Feeding Tube BID Ekaterina Gayle MD   5 mg at 04/10/25 0840    atorvastatin (LIPITOR) tablet 20 mg  20 mg Oral or Feeding Tube QPM Ekaterina Gayle MD   20 mg at 04/09/25 2222    calcitRIOL (ROCALTROL) capsule 0.25 mcg  0.25 mcg Oral Once per day on Monday Wednesday Friday Ekaterina Gayle MD   0.25 mcg at 04/07/25 1330    cetirizine (zyrTEC) tablet 10 mg  10 mg Oral or Feeding Tube At Bedtime Ekaterina Gayle MD   10 mg at 04/09/25 2222    hydrALAZINE (APRESOLINE)  tablet 75 mg  75 mg Oral or Feeding Tube Q8H Ekaterina Gayle MD   75 mg at 04/10/25 1318    insulin aspart (NovoLOG) injection (RAPID ACTING)  1-7 Units Subcutaneous TID AC Blaise Holden MD   2 Units at 04/09/25 0942    insulin aspart (NovoLOG) injection (RAPID ACTING)  1-5 Units Subcutaneous At Bedtime Blaise Holden MD        insulin glargine (LANTUS PEN) injection 12 Units  12 Units Subcutaneous BID Ekaterina Gayle MD   12 Units at 04/10/25 0848    ipratropium - albuterol 0.5 mg/2.5 mg/3 mL (DUONEB) neb solution 3 mL  3 mL Nebulization Q4H WA Owen Andrade MD   3 mL at 04/10/25 1338    midodrine (PROAMATINE) tablet 5 mg  5 mg Oral Once per day on Monday Wednesday Friday Ekaterina Gayle MD   5 mg at 04/09/25 1342    pantoprazole (PROTONIX) 2 mg/mL suspension 40 mg  40 mg Per Feeding Tube QAM AC Ekaterina Gayle MD   40 mg at 04/10/25 0840    polyethylene glycol (MIRALAX) Packet 17 g  17 g Oral or Feeding Tube BID Ekaterina Gayle MD   17 g at 04/10/25 0840    [Held by provider] Prosource TF20 ENfit Compatibl EN LIQD (PROSOURCE TF20) packet 60 mL  1 packet Per Feeding Tube Daily Ekaterina Gayle MD   60 mL at 03/31/25 1407    sennosides (SENOKOT) tablet 2 tablet  2 tablet Oral BID Blaise Holden MD   2 tablet at 04/10/25 0840    sertraline (ZOLOFT) tablet 75 mg  75 mg Oral or Feeding Tube Daily Ekaterina Gayle MD   75 mg at 04/10/25 0840    sevelamer carbonate (RENVELA) tablet 1,600 mg  1,600 mg Oral TID w/meals Ekaterina Gayle MD   1,600 mg at 04/08/25 1835    sodium chloride (PF) 0.9% PF flush 3 mL  3 mL Intravenous Q8H Ganesh Lawson MD   3 mL at 04/10/25 0841    sodium chloride (PF) 0.9% PF flush 3 mL  3 mL Intracatheter Q8H [facility] Ekaterina Gayle MD   3 mL at 04/08/25 2105    vancomycin place cardoso - receiving intermittent dosing  1 each Intravenous See Admin Instructions Ekaterina Gayle MD        vitamin D3 (CHOLECALCIFEROL) tablet 50 mcg  50  mcg Oral or Feeding Tube Daily Ekaterina Gayle MD   50 mcg at 04/10/25 0840        Data   Recent Labs   Lab 04/10/25  1213 04/10/25  0929 04/10/25  0513 04/09/25  1707 04/09/25  1309 04/08/25  0838 04/08/25  0544 04/07/25  0806 04/07/25  0641 04/05/25  1137 04/05/25  0607   WBC  --   --  5.5  --   --   --  7.1  --   --   --  5.7   HGB  --   --  8.7*  --  7.6*  --  7.7*  --  7.5*  --  8.1*   MCV  --   --  91  --   --   --  95  --   --   --  97   PLT  --   --  205  --   --   --  199  --   --   --  208   NA  --   --  132*  --   --   --   --   --  134*  --  136   POTASSIUM  --   --  3.6  --  4.4  --   --   --  4.7  --  3.9   CHLORIDE  --   --  93*  --   --   --   --   --  94*  --  96*   CO2  --   --  30*  --   --   --   --   --  29  --  29   BUN  --   --  33.5*  --   --   --   --   --  77.0*  --  27.3*   CR  --   --  2.52*  --   --   --   --   --  4.92*  --  2.51*   ANIONGAP  --   --  9  --   --   --   --   --  11  --  11   JODY  --   --  9.8  --   --   --   --   --  9.9  --  10.1   * 159* 150*   < >  --    < >  --    < > 229*   < > 178*   ALBUMIN  --   --   --   --   --   --   --   --  3.1*  --   --     < > = values in this interval not displayed.       Recent Results (from the past 24 hours)   CT Chest w/o Contrast   Result Value    Radiologist flags Chest CT in 3 months.    Narrative    EXAM: CT CHEST W/O CONTRAST  LOCATION: [facility]  DATE: 4/9/2025    INDICATION: Shortness of breath.  COMPARISON: 3/27/2025 and 11/12/2019.  TECHNIQUE: CT chest without IV contrast. Multiplanar reformats were obtained. Dose reduction techniques were used.  CONTRAST: None.    FINDINGS: Absence of intravenous contrast limits the sensitivity of this examination for detection of infectious/inflammatory change, post traumatic abnormalities, vascular abnormalities, and visceral lesions. Study is additionally degraded by motion.    LUNGS AND PLEURA: Mild to moderate diffuse bronchial wall thickening, with  mucus plugging within peripheral subsegmental branches. No pulmonary interstitial edema. Mild to moderate dependent atelectatic change. Near complete right middle lobe collapse,   new from prior and without obstructing endobronchial lesion identified.     A 3 mm nodule in the left upper lobe, series 4 image 142, similar to recent prior, though new from 11/12/2019.    No pneumothorax.    Moderate left and small right pleural effusions.     MEDIASTINUM/AXILLAE: Mild mediastinal lymphadenopathy, similar to recent prior, though new from 2019.      Thoracic esophagus is unremarkable.      No axillary lymphadenopathy.    Chest wall is unremarkable.    No thoracic aortic aneurysm. Moderate atherosclerotic calcifications.    Moderate cardiomegaly. Dense calcifications of the mitral annulus. No pericardial effusion.    CORONARY ARTERY CALCIFICATION: Severe.    UPPER ABDOMEN: Cholelithiasis. Percutaneous gastrostomy catheter. Colonic diverticulosis. Mild to moderate bilateral renal parenchymal atrophy.    MUSCULOSKELETAL: No suspicious abnormality. Multiple old bilateral rib fracture deformities. Diffuse idiopathic skeletal hyperostosis of the thoracic spine.    OTHER: No additionally suspicious abnormality.      Impression    IMPRESSION:  1.  Trace interstitial edema, with associated moderate left and small right pleural effusions.  2.  Mild to moderate diffuse bronchial wall thickening, with associated mucus plugging in peripheral subsegmental branches.  3.  Near complete right middle lobe collapse, new from prior and without obstructing endobronchial lesion identified. A follow-up chest CT is recommended in 3 months to document resolution of this finding.  4.  Mild mediastinal lymphadenopathy, similar to recent prior, though new from 2019.   5.  Attention on 3 month follow-up chest CT.  6.  Moderate cardiomegaly.  7.  Cholelithiasis.  8.  Colonic diverticulosis.  9.  Indeterminate 3 mm left upper lobe pulmonary nodule, new  from 2019.  Attention on 3 month follow-up chest CT.      [Recommend Follow Up: Chest CT in 3 months.]    This report will be copied to the [facility] to ensure a provider acknowledges the finding.          Owen Andrade MD  Text Page  (7am to 6pm)

## 2025-04-10 NOTE — PROGRESS NOTES
Care Management Follow Up    Spoke with  Oxygen liaison Mairi regarding BiPap at home.  Marii reported she reviewed her chart and felt she does not have qualifying DX for Bipap at home. However patient will need to keep her outpt sleep study and they would be able to document that after sleep study noted patient failed  Cpap  and will need Bipap at home.      Manuel Richey RN

## 2025-04-10 NOTE — PROVIDER NOTIFICATION
04/09/25 5633   Tech Time   $Tech Time (10 minute increments) 4   Mode: CPAP/ BiPAP/ AVAPS/ AVAPS AE   CPAP/BiPAP/ AVAPS/ AVAPS AE Mode CPAP   Equipment   Device resmed   CPAP/BiPAP/Settings   $CPAP/BiPAP Initial completed   BIPAP/CPAP On Standby On   IPAP/EPAP (cmH2O)   (5-20 auto cpap)       CPAP/BiPAP Patient Parameters   CPAP (cm H2O)   (5-20 auto)   CPAP/BiPAP/AVAPS/AVAPS AE Alarms   Humidifier Checked N/A   RT Device Skin Assessment   Interface Face Mask - Small   Site Appearance neck circumference Clean and dry   Site Appearance nares (outer) Clean and dry   Site Appearance nares (inner) Clean and dry   Site Appearance lips Clean and dry   Site Appearance bridge of nose Clean and dry   Site Appearance of ears Clean and dry   Site Appearance occiput Clean and dry   Strap Tightness Finger Allowance between head and device strap   Device Skin Interventions Taken Skin barrier applied;Strap adjusted to allow 1 finger between skin and device strap   Patient placed on CPAP auto 5-20 with 3L O2 bled in. Settings found from sleep study in 2022.

## 2025-04-10 NOTE — PROGRESS NOTES
PALLIATIVE CARE PROGRESS NOTE  [facility]     Patient Name: Supriya Herr  Date of Admission: 3/27/2025   Today the patient was seen for: goals of care     Recommendations & Counseling       GOALS OF CARE:   Restorative with limits   Yesterday at around 2200 Supriya had a chest CT showing moderate left and small right pleural effusions, mucous plugging, near complete right middle lobe collapse. She underwent a thoracentesis today and plan is for RT to provide nebs/chest physiotherapies over the weekend.   Ultimately, the goal is for Supriya to discharge home with Caroline if her condition improves with interventions over the weekend. If no improvement, will readdress goals of care on Monday.    ADVANCE CARE PLANNING:  No health care directive on file. Per system policy, Surrogate Decision-makers for Patients With Diminished Decision-making Capacity offers guidance on possible decision-makers. Deneen Velazquez has been identified as a surrogate decision maker.   There is a POLST form on file, this was reviewed and current.  Code status: No CPR- Pre-arrest intubation OK     MEDICAL MANAGEMENT:     #Shortness of breath/Dyspnea related to to pleural effusions, lung collapse, mucous plugging, ESRD, HF.  Nurses noted significant anxiety and shortness of breath yesterday evening.  She had a thoracentesis today so hopefully symptoms will improve as well as starting aggressive lung toileting from RT starting this afternoon.  Hydroxyzine 25 mg every 6 hours as needed.  Several doses given yesterday with little improvement in symptoms.  Seroquel 12.5 mg as needed for anxiety-nurses did not note any improvement in symptoms.  Will trial hydromorphone/Dilaudid 0.2 mg every 6 hours for severe symptoms of shortness of breath.   Fan at bedside     PSYCHOSOCIAL/SPIRITUAL SUPPORT:  Family daughter Caroline and grandchildren    Palliative Care will continue to follow. Thank you for the consult and allowing us to aid in  the care of Supriya Herr.    These recommendations have been discussed with primary and bedside teams.    Pamela Wells CNS  Securely message with AddMyBest (more info)  Text page via Vsnap Paging/Directory       Chart documentation was completed, in part, with [employer] voice-recognition software. Even though reviewed, some grammatical, spelling, and word errors may remain.         Interval History:     Supriya has had increasing feelings of SOB later in afternoon yesterday. Hydroxyzine and Seroquel given without good response.      Yesterday at around 2200 had a chest CT showing moderate left and small right pleural effusion, mucous plugging, near complete right middle lobe collapse. She underwent a thoracentesis. Plan is for RT to provide nebs/chest physiotherapies over the weekend and hope for improvement. I will meet with patient and daughter Caroline on Monday to check in for goals of care.     Assessment          Supriya Herr is a 75 year old female with a past medical history of ESRD on HD, a-fib, HTN HLD, HFpEF, anemia, dysphagia s/p PEG, anxiety/depression who presented on 3/27/25 with abdominal pain and retching.  Upon further evaluation she was found to be constipated, elevated WBC, procal, and CRP of unclear etiology. The plan is to complete a 6 week course of IV vancomycin.     Today, the patient was seen for:  Goals of care          Review of Systems:     Besides above, ROS was reviewed and is unremarkable           Physical Exam:   Temp:  [97.6  F (36.4  C)-98.2  F (36.8  C)] 98.1  F (36.7  C)  Pulse:  [62-77] 66  Resp:  [18-33] 20  BP: (109-181)/() 165/56  FiO2 (%):  [30 %] 30 %  SpO2:  [87 %-100 %] 98 %  188 lbs 7.89 oz    Physical Exam  GENERAL:  Alert, fatigued, confused, anxious, mild distress, lying in bed, appears chronically ill.  HEAD: Normocephalic atraumatic  EXTREMITIES: Warm; bilateral LE edema  ABDOMEN:  Obese  RESPIRATORY: Breathing labored during conversation, with accessory  muscle use.  CARDIOVASCULAR: RRR  NEUROLOGIC: Alert.  PSYCH: Cooperative, jangled.           Current Problem List:   Active Problems:    ESRD on hemodialysis (H)    Generalized weakness    Constipation, unspecified constipation type    Leukocytosis, unspecified type      Allergies   Allergen Reactions    Ampicillin-Sulbactam Sodium Rash     No evidence SJS, but very uncomfortable and precipitated multiple provider visits. Would not use penicillins again if other options available.     Penicillins Rash            Data Reviewed:     Results for orders placed or performed during the hospital encounter of 03/27/25 (from the past 24 hours)   Glucose by meter   Result Value Ref Range    GLUCOSE BY METER POCT 137 (H) 70 - 99 mg/dL   Potassium   Result Value Ref Range    Potassium 4.4 3.4 - 5.3 mmol/L   Hemoglobin   Result Value Ref Range    Hemoglobin 7.6 (L) 11.7 - 15.7 g/dL   XR Chest Port 1 View    Narrative    EXAM: XR CHEST PORT 1 VIEW  LOCATION: [facility]  DATE: 4/9/2025    INDICATION: Hypoxemia.  COMPARISON: 4/6/2025.      Impression    IMPRESSION: Right basilar atelectasis or infiltrate, increased from previous. Pulmonary vascularity is congested with probable pulmonary edema.   Prepare red blood cells (unit)   Result Value Ref Range    Blood Component Type Red Blood Cells     Product Code M1136A84     Unit Status Transfused     Unit Number [id number]     CROSSMATCH Compatible     CODING SYSTEM [id number]     ISSUE DATE AND TIME [id number]     UNIT ABO/RH A-     UNIT TYPE ISBT 0600    ABO/Rh type and screen    Narrative    The following orders were created for panel order ABO/Rh type and screen.  Procedure                               Abnormality         Status                     ---------                               -----------         ------                     Adult Type and Screen[[id number]]                           Final result                 Please view results for these  tests on the individual orders.   Adult Type and Screen   Result Value Ref Range    ABO/RH(D) A POS     Antibody Screen Negative Negative    SPECIMEN EXPIRATION DATE [id number]    Glucose by meter   Result Value Ref Range    GLUCOSE BY METER POCT 86 70 - 99 mg/dL   CT Chest w/o Contrast   Result Value Ref Range    Radiologist flags Chest CT in 3 months.     Narrative    EXAM: CT CHEST W/O CONTRAST  LOCATION: [facility]  DATE: 4/9/2025    INDICATION: Shortness of breath.  COMPARISON: 3/27/2025 and 11/12/2019.  TECHNIQUE: CT chest without IV contrast. Multiplanar reformats were obtained. Dose reduction techniques were used.  CONTRAST: None.    FINDINGS: Absence of intravenous contrast limits the sensitivity of this examination for detection of infectious/inflammatory change, post traumatic abnormalities, vascular abnormalities, and visceral lesions. Study is additionally degraded by motion.    LUNGS AND PLEURA: Mild to moderate diffuse bronchial wall thickening, with mucus plugging within peripheral subsegmental branches. No pulmonary interstitial edema. Mild to moderate dependent atelectatic change. Near complete right middle lobe collapse,   new from prior and without obstructing endobronchial lesion identified.     A 3 mm nodule in the left upper lobe, series 4 image 142, similar to recent prior, though new from 11/12/2019.    No pneumothorax.    Moderate left and small right pleural effusions.     MEDIASTINUM/AXILLAE: Mild mediastinal lymphadenopathy, similar to recent prior, though new from 2019.      Thoracic esophagus is unremarkable.      No axillary lymphadenopathy.    Chest wall is unremarkable.    No thoracic aortic aneurysm. Moderate atherosclerotic calcifications.    Moderate cardiomegaly. Dense calcifications of the mitral annulus. No pericardial effusion.    CORONARY ARTERY CALCIFICATION: Severe.    UPPER ABDOMEN: Cholelithiasis. Percutaneous gastrostomy catheter. Colonic  diverticulosis. Mild to moderate bilateral renal parenchymal atrophy.    MUSCULOSKELETAL: No suspicious abnormality. Multiple old bilateral rib fracture deformities. Diffuse idiopathic skeletal hyperostosis of the thoracic spine.    OTHER: No additionally suspicious abnormality.      Impression    IMPRESSION:  1.  Trace interstitial edema, with associated moderate left and small right pleural effusions.  2.  Mild to moderate diffuse bronchial wall thickening, with associated mucus plugging in peripheral subsegmental branches.  3.  Near complete right middle lobe collapse, new from prior and without obstructing endobronchial lesion identified. A follow-up chest CT is recommended in 3 months to document resolution of this finding.  4.  Mild mediastinal lymphadenopathy, similar to recent prior, though new from 2019.   5.  Attention on 3 month follow-up chest CT.  6.  Moderate cardiomegaly.  7.  Cholelithiasis.  8.  Colonic diverticulosis.  9.  Indeterminate 3 mm left upper lobe pulmonary nodule, new from 2019.  Attention on 3 month follow-up chest CT.      [Recommend Follow Up: Chest CT in 3 months.]    This report will be copied to the [facility] to ensure a provider acknowledges the finding.      Glucose by meter   Result Value Ref Range    GLUCOSE BY METER POCT 105 (H) 70 - 99 mg/dL   Glucose by meter   Result Value Ref Range    GLUCOSE BY METER POCT 148 (H) 70 - 99 mg/dL   Basic metabolic panel   Result Value Ref Range    Sodium 132 (L) 135 - 145 mmol/L    Potassium 3.6 3.4 - 5.3 mmol/L    Chloride 93 (L) 98 - 107 mmol/L    Carbon Dioxide (CO2) 30 (H) 22 - 29 mmol/L    Anion Gap 9 7 - 15 mmol/L    Urea Nitrogen 33.5 (H) 8.0 - 23.0 mg/dL    Creatinine 2.52 (H) 0.51 - 0.95 mg/dL    GFR Estimate 19 (L) >60 mL/min/1.73m2    Calcium 9.8 8.8 - 10.4 mg/dL    Glucose 150 (H) 70 - 99 mg/dL   CBC with platelets   Result Value Ref Range    WBC Count 5.5 4.0 - 11.0 10e3/uL    RBC Count 3.07 (L) 3.80 - 5.20 10e6/uL     Hemoglobin 8.7 (L) 11.7 - 15.7 g/dL    Hematocrit 27.8 (L) 35.0 - 47.0 %    MCV 91 78 - 100 fL    MCH 28.3 26.5 - 33.0 pg    MCHC 31.3 (L) 31.5 - 36.5 g/dL    RDW 17.6 (H) 10.0 - 15.0 %    Platelet Count 205 150 - 450 10e3/uL   Glucose by meter   Result Value Ref Range    GLUCOSE BY METER POCT 159 (H) 70 - 99 mg/dL     *Note: Due to a large number of results and/or encounters for the requested time period, some results have not been displayed. A complete set of results can be found in Results Review.         Medical Decision Making       Please see A&P for additional details of medical decision making.  MANAGEMENT DISCUSSED with the following over the past 24 hours: Dr Andrade, Gracie Martinez RN, CM/RNCC   NOTE(S)/MEDICAL RECORDS REVIEWED over the past 24 hours: Medicine, RT, Nephrology,, Nursing, Pharmacy   Tests REVIEWED in the past 24 hours:  - See lab/imaging results included in the data section of the note  SUPPLEMENTAL HISTORY, in addition to the patient's history, over the past 24 hours obtained from:   - chalino Velazquez  Medical complexity over the past 24 hours:  - Parenteral (IV) CONTROLLED SUBSTANCES ordered

## 2025-04-10 NOTE — PROGRESS NOTES
"Brief nephrology  note    Dialysis planned for tomorrow. Orders in. Yesterday noted to have a mod L pleural effusion and mucus plugging. Getting a thoracentesis and chest physiotherapy. BP\"s have been in the 140-160 range. Note I have initiated epo with dialysis runs as we don't typically use mircera here and due to her prolonged hospitalization she will need some coverage.     Dialysis yesterday we pulled 3L and blood pressures were in the 120-130 range at the end.     Alysia Roman MD MS  "

## 2025-04-11 VITALS
WEIGHT: 188.49 LBS | SYSTOLIC BLOOD PRESSURE: 149 MMHG | DIASTOLIC BLOOD PRESSURE: 49 MMHG | HEIGHT: 66 IN | BODY MASS INDEX: 30.29 KG/M2 | OXYGEN SATURATION: 100 % | HEART RATE: 74 BPM | RESPIRATION RATE: 18 BRPM | TEMPERATURE: 98.8 F

## 2025-04-11 LAB
ANION GAP SERPL CALCULATED.3IONS-SCNC: 9 MMOL/L (ref 7–15)
BUN SERPL-MCNC: 58.8 MG/DL (ref 8–23)
CALCIUM SERPL-MCNC: 10.4 MG/DL (ref 8.8–10.4)
CHLORIDE SERPL-SCNC: 93 MMOL/L (ref 98–107)
CREAT SERPL-MCNC: 3.9 MG/DL (ref 0.51–0.95)
EGFRCR SERPLBLD CKD-EPI 2021: 11 ML/MIN/1.73M2
ERYTHROCYTE [DISTWIDTH] IN BLOOD BY AUTOMATED COUNT: 17 % (ref 10–15)
GLUCOSE BLDC GLUCOMTR-MCNC: 105 MG/DL (ref 70–99)
GLUCOSE BLDC GLUCOMTR-MCNC: 150 MG/DL (ref 70–99)
GLUCOSE BLDC GLUCOMTR-MCNC: 164 MG/DL (ref 70–99)
GLUCOSE BLDC GLUCOMTR-MCNC: 168 MG/DL (ref 70–99)
GLUCOSE BLDC GLUCOMTR-MCNC: 179 MG/DL (ref 70–99)
GLUCOSE SERPL-MCNC: 170 MG/DL (ref 70–99)
HCO3 SERPL-SCNC: 32 MMOL/L (ref 22–29)
HCT VFR BLD AUTO: 26.7 % (ref 35–47)
HGB BLD-MCNC: 8.3 G/DL (ref 11.7–15.7)
MCH RBC QN AUTO: 28.5 PG (ref 26.5–33)
MCHC RBC AUTO-ENTMCNC: 31.1 G/DL (ref 31.5–36.5)
MCV RBC AUTO: 92 FL (ref 78–100)
PLATELET # BLD AUTO: 216 10E3/UL (ref 150–450)
POTASSIUM SERPL-SCNC: 3.8 MMOL/L (ref 3.4–5.3)
RBC # BLD AUTO: 2.91 10E6/UL (ref 3.8–5.2)
SODIUM SERPL-SCNC: 134 MMOL/L (ref 135–145)
VANCOMYCIN SERPL-MCNC: 29.5 UG/ML
WBC # BLD AUTO: 6.9 10E3/UL (ref 4–11)

## 2025-04-11 PROCEDURE — 80202 ASSAY OF VANCOMYCIN: CPT | Performed by: STUDENT IN AN ORGANIZED HEALTH CARE EDUCATION/TRAINING PROGRAM

## 2025-04-11 PROCEDURE — 250N000013 HC RX MED GY IP 250 OP 250 PS 637: Performed by: INTERNAL MEDICINE

## 2025-04-11 PROCEDURE — 250N000009 HC RX 250: Performed by: INTERNAL MEDICINE

## 2025-04-11 PROCEDURE — 999N000157 HC STATISTIC RCP TIME EA 10 MIN

## 2025-04-11 PROCEDURE — 36415 COLL VENOUS BLD VENIPUNCTURE: CPT | Performed by: INTERNAL MEDICINE

## 2025-04-11 PROCEDURE — 250N000011 HC RX IP 250 OP 636: Performed by: STUDENT IN AN ORGANIZED HEALTH CARE EDUCATION/TRAINING PROGRAM

## 2025-04-11 PROCEDURE — 85027 COMPLETE CBC AUTOMATED: CPT | Performed by: INTERNAL MEDICINE

## 2025-04-11 PROCEDURE — 250N000013 HC RX MED GY IP 250 OP 250 PS 637: Performed by: STUDENT IN AN ORGANIZED HEALTH CARE EDUCATION/TRAINING PROGRAM

## 2025-04-11 PROCEDURE — 120N000001 HC R&B MED SURG/OB

## 2025-04-11 PROCEDURE — 90937 HEMODIALYSIS REPEATED EVAL: CPT

## 2025-04-11 PROCEDURE — 250N000013 HC RX MED GY IP 250 OP 250 PS 637: Performed by: HOSPITALIST

## 2025-04-11 PROCEDURE — 634N000001 HC RX 634: Mod: JZ | Performed by: INTERNAL MEDICINE

## 2025-04-11 PROCEDURE — 94640 AIRWAY INHALATION TREATMENT: CPT | Mod: 76

## 2025-04-11 PROCEDURE — 90935 HEMODIALYSIS ONE EVALUATION: CPT | Performed by: INTERNAL MEDICINE

## 2025-04-11 PROCEDURE — 94640 AIRWAY INHALATION TREATMENT: CPT

## 2025-04-11 PROCEDURE — 99232 SBSQ HOSP IP/OBS MODERATE 35: CPT | Performed by: INTERNAL MEDICINE

## 2025-04-11 PROCEDURE — G0463 HOSPITAL OUTPT CLINIC VISIT: HCPCS

## 2025-04-11 PROCEDURE — 80048 BASIC METABOLIC PNL TOTAL CA: CPT | Performed by: INTERNAL MEDICINE

## 2025-04-11 PROCEDURE — 258N000003 HC RX IP 258 OP 636: Performed by: STUDENT IN AN ORGANIZED HEALTH CARE EDUCATION/TRAINING PROGRAM

## 2025-04-11 PROCEDURE — 94660 CPAP INITIATION&MGMT: CPT

## 2025-04-11 RX ORDER — HEPARIN SODIUM 1000 [USP'U]/ML
500 INJECTION, SOLUTION INTRAVENOUS; SUBCUTANEOUS CONTINUOUS
Status: DISCONTINUED | OUTPATIENT
Start: 2025-04-11 | End: 2025-04-11

## 2025-04-11 RX ORDER — CEFAZOLIN SODIUM 1 G/50ML
750 SOLUTION INTRAVENOUS ONCE
Status: COMPLETED | OUTPATIENT
Start: 2025-04-11 | End: 2025-04-11

## 2025-04-11 RX ORDER — FORMOTEROL FUMARATE 20 UG/2ML
20 SOLUTION RESPIRATORY (INHALATION) EVERY 12 HOURS
Status: DISCONTINUED | OUTPATIENT
Start: 2025-04-11 | End: 2025-04-17 | Stop reason: HOSPADM

## 2025-04-11 RX ORDER — BUDESONIDE 0.5 MG/2ML
0.5 INHALANT ORAL 2 TIMES DAILY
Status: DISCONTINUED | OUTPATIENT
Start: 2025-04-11 | End: 2025-04-17 | Stop reason: HOSPADM

## 2025-04-11 RX ADMIN — POLYETHYLENE GLYCOL 3350 17 G: 17 POWDER, FOR SOLUTION ORAL at 21:03

## 2025-04-11 RX ADMIN — Medication 50 MCG: at 08:08

## 2025-04-11 RX ADMIN — INSULIN GLARGINE 12 UNITS: 100 INJECTION, SOLUTION SUBCUTANEOUS at 08:16

## 2025-04-11 RX ADMIN — CETIRIZINE HYDROCHLORIDE 10 MG: 10 TABLET, FILM COATED ORAL at 21:01

## 2025-04-11 RX ADMIN — Medication 40 MG: at 08:08

## 2025-04-11 RX ADMIN — AMIODARONE HYDROCHLORIDE 200 MG: 200 TABLET ORAL at 13:58

## 2025-04-11 RX ADMIN — HYDROXYZINE HYDROCHLORIDE 25 MG: 25 TABLET ORAL at 13:58

## 2025-04-11 RX ADMIN — APIXABAN 5 MG: 5 TABLET, FILM COATED ORAL at 08:08

## 2025-04-11 RX ADMIN — IPRATROPIUM BROMIDE AND ALBUTEROL SULFATE 3 ML: .5; 3 SOLUTION RESPIRATORY (INHALATION) at 15:45

## 2025-04-11 RX ADMIN — CALCITRIOL CAPSULES 0.25 MCG 0.25 MCG: 0.25 CAPSULE ORAL at 08:08

## 2025-04-11 RX ADMIN — FORMOTEROL FUMARATE 20 MCG: 20 SOLUTION RESPIRATORY (INHALATION) at 19:55

## 2025-04-11 RX ADMIN — SENNOSIDES 2 TABLET: 8.6 TABLET ORAL at 13:58

## 2025-04-11 RX ADMIN — IPRATROPIUM BROMIDE AND ALBUTEROL SULFATE 3 ML: .5; 3 SOLUTION RESPIRATORY (INHALATION) at 19:54

## 2025-04-11 RX ADMIN — HYDRALAZINE HYDROCHLORIDE 75 MG: 25 TABLET ORAL at 16:16

## 2025-04-11 RX ADMIN — INSULIN ASPART 1 UNITS: 100 INJECTION, SOLUTION INTRAVENOUS; SUBCUTANEOUS at 18:12

## 2025-04-11 RX ADMIN — HYDROXYZINE HYDROCHLORIDE 25 MG: 25 TABLET ORAL at 05:00

## 2025-04-11 RX ADMIN — SERTRALINE HYDROCHLORIDE 75 MG: 50 TABLET ORAL at 08:08

## 2025-04-11 RX ADMIN — EPOETIN ALFA-EPBX 10000 UNITS: 10000 INJECTION, SOLUTION INTRAVENOUS; SUBCUTANEOUS at 11:16

## 2025-04-11 RX ADMIN — IPRATROPIUM BROMIDE AND ALBUTEROL SULFATE 3 ML: .5; 3 SOLUTION RESPIRATORY (INHALATION) at 08:24

## 2025-04-11 RX ADMIN — SEVELAMER CARBONATE 1600 MG: 800 TABLET, FILM COATED ORAL at 17:15

## 2025-04-11 RX ADMIN — VANCOMYCIN HYDROCHLORIDE 750 MG: 1 INJECTION, POWDER, LYOPHILIZED, FOR SOLUTION INTRAVENOUS at 14:41

## 2025-04-11 RX ADMIN — INSULIN GLARGINE 12 UNITS: 100 INJECTION, SOLUTION SUBCUTANEOUS at 21:15

## 2025-04-11 RX ADMIN — INSULIN ASPART 1 UNITS: 100 INJECTION, SOLUTION INTRAVENOUS; SUBCUTANEOUS at 08:16

## 2025-04-11 RX ADMIN — HYDRALAZINE HYDROCHLORIDE 75 MG: 25 TABLET ORAL at 05:00

## 2025-04-11 RX ADMIN — APIXABAN 5 MG: 5 TABLET, FILM COATED ORAL at 21:01

## 2025-04-11 RX ADMIN — ATORVASTATIN CALCIUM 20 MG: 20 TABLET, FILM COATED ORAL at 21:01

## 2025-04-11 RX ADMIN — BUDESONIDE 0.5 MG: 0.5 INHALANT RESPIRATORY (INHALATION) at 19:54

## 2025-04-11 ASSESSMENT — ACTIVITIES OF DAILY LIVING (ADL)
ADLS_ACUITY_SCORE: 93
ADLS_ACUITY_SCORE: 85
ADLS_ACUITY_SCORE: 89
ADLS_ACUITY_SCORE: 93
ADLS_ACUITY_SCORE: 89
ADLS_ACUITY_SCORE: 85
ADLS_ACUITY_SCORE: 89
ADLS_ACUITY_SCORE: 92
ADLS_ACUITY_SCORE: 93
ADLS_ACUITY_SCORE: 93
ADLS_ACUITY_SCORE: 89
ADLS_ACUITY_SCORE: 92
ADLS_ACUITY_SCORE: 93
ADLS_ACUITY_SCORE: 89
ADLS_ACUITY_SCORE: 93

## 2025-04-11 NOTE — PROGRESS NOTES
"SPIRITUAL HEALTH SERVICES - Progress Note  [facility]    Referral Source/Reason for Visit: palliative/referral from previous     Visited with Supriya shortly after her return from dialysis.    Supriya anticipates either discharging home or to [facility]. Her preference, she names, is to go home.  She expressed anxiety related to whether her oxygen mask was working properly. Appreciate support of nursing staff throughout visit.  Supriya finds meaning in her relationship with her grandson and shared that her hope is to be able to \"watch him grow up.\"  She requested prayer and reflected on those hopes and concerns dearest to her heart at this time.    Offered emotional/spiritual support through reflective conversation and validation of Supriya's experience and emotions.    Plan: Spiritual Health remains available. Please contact as needs arise.    Kamryn He  Associate     SHS available 24/7 for emergent requests/referrals, either by paging the on-call  or by entering an ASAP/STAT consult in Epic (this will also page the on-call ).   "

## 2025-04-11 NOTE — PLAN OF CARE
Shift Summary [id number]:    VSS on 3 O2 oxymask. A/Ox2, oriented to self and time (knows it is April 2025). Pt reports intermittent abd pain but declines pain meds. LS dim w/ fine crackles this AM. Pt c/o of SOB at times, PRN atarax given for anxiety x1. Fistula to LUE WDL. No BM this shift, +BS and passing flatus. Poor appetite on reg diet. TF running in g-tube. WOCN completed coccyx wound care. Up A2 lift. Repo q2h, pt occasionally refuses repos, education provided. Consulted teams: Pulm, Neph, ID. Dgt at bedside. Discharge tbd. Care plan updated.     See flowsheet for full assessment. See MAR for meds given.    Yahir Oneill RN  6:33 PM    Goal Outcome Evaluation:      Plan of Care Reviewed With: patient    Overall Patient Progress: no change    Outcome Evaluation: HD run today, 3L off.    Problem: Gas Exchange Impaired  Goal: Optimal Gas Exchange  Outcome: Not Progressing  Intervention: Optimize Oxygenation and Ventilation    Problem: Hemodialysis  Goal: Safe, Effective Therapy Delivery  Intervention: Optimize Device Care and Function  Medication Review/Management: medications reviewed

## 2025-04-11 NOTE — PROGRESS NOTES
ID Chart Check    Patient on Vancomycin with dialysis until 5/10/25 for E.faecalis bacteremia.   Routine labs stable. Underwent thoracentesis 4/10, only 3 neutrophils in cell count. Afebrile. No leukocytosis.     Continue the same. ID will follow intermittently and arrange follow-up with ID upon discharge. Please call with questions in the mean time.       Arcelia Velazquez PA-C

## 2025-04-11 NOTE — PROGRESS NOTES
"[facility] Nurse Inpatient Assessment     Consulted for: \"buttock\"    Summary:  Left buttock wounds are healing well and are mostly epithelialized with a few very small scattered scabs. Pink scar tissue and chronic tissue discoloration noted to bilateral buttocks and thighs.    [facility] nurse follow-up plan: weekly    Patient History (according to provider note(s):      \"Supriya Herr is a 75 year old female with very complex past medical history including end-stage renal disease on hemodialysis, HFpEF, paroxysmal atrial fibrillation, hypertension, hyperlipidemia, anxiety, depression, chronic anemia, dysphagia status post PEG tube, type 2 diabetes, left traumatic AKA and recent hospitalization with multifactorial respiratory failure now being admitted on 3/27/2025 with abdominal pain and retching. She is found to have e/o constipation. But other evaluation notable for elevated WBC, procal and CRP of unclear etiology.     Pf note, pt with multiple recent hospitalizations. Per last discharge summary:   \"Hospitalized at [facility] from 3/2 - 3/6/2025 with shortness of breath likely multifactorial.  Volume status was difficult to determine, underwent right heart cath on 3/5 showed mildly elevated right atrial and PCWP readings.  Was treated for acute respiratory failure likely from heart failure exacerbation, atrial fibrillation, end-stage renal disease and discharged to care facility with supplemental nocturnal oxygen.\"  \"Prolonged hospitalized at [facility] from 1/8 to 2/26/2025 for acute hypoxic respiratory failure multifactorial, was intubated ( 1/9-1/18), reintubated (1/20 - 1/25).  Then was treated for shock with pressors, influenza A pneumonia, hospital-acquired pneumonia with intravenous antibiotics, steroids, antivirals.  Then also noted to have heart failure exacerbation, recurrent A-fib with RVR, subsequent bradycardia.  Then also noted to have encephalopathy likely from infectious, metabolic " "etiology and delirium.  During that hospitalization was noted to have dysphagia, pharyngeal edema, was evaluated by ENT on 2/16.  No findings to explain dysphagia.  G-tube was placed by IR on 2/17 and was on nocturnal tube feeds.  Discharged to TCU for rehabilitation.\"    Assessment:      Areas visualized during today's visit: Focused:    Wound location:     Last photo: 4-10-25    Wound due to: Friction, Incontinence Associated Dermatitis (IAD), and Intertrigo  Wound history/plan of care: Multiple pink scars noted to buttocks/sacrococcygeal area. Cluster of open wounds to left buttocks are almost healed, etiology unknown but suspect this is related to incontinence, friction, and there may be a pressure component. Small area of intertrigo/IAD to gluteal cleft is now dry and intact with no redness.    Wound base:      Palpation of the wound bed: normal      Drainage: none     Wound bed: Scattered small scabs. Generally area is re-epithelialized.      Tunneling: N/A     Undermining: N/A  Periwound skin: Intact and Scar tissue      Color: normal and consistent with surrounding tissue, dusky, pale, and pink      Temperature: normal   Odor: none  Pain: tenderness   Treatment goal: Heal , Infection control/prevention, Protection, and Simplify plan of care  STATUS follow-up assessment   Supplies ordered: gathered, at bedside, supplies stored on unit, and discussed with patient     WOC will follow-up weekly        Treatment Plan:     Buttocks wound(s): Daily and prn following incontinent episode  Cleanse skin with Martir spray and soft white Austin cloths   Apply Mepilex 4x4 to area   Follow PIP plan    Pressure Injury Prevention (PIP) Plan:  If patient is declining pressure injury prevention interventions: Explore reason why and address patient's concerns, Educate on pressure injury risk and prevention intervention(s), If patient is still declining, document \"informed refusal\" , and Ensure Care team is aware ( provider, " charge nurse, etc)  Mattress: Follow bed algorithm, add Low Air Loss (Air+) mattress pump if skin is very moist or constantly moist.   HOB: Maintain at or below 30 degrees, unless contraindicated  Repositioning in bed: Every 1-2 hours , Left/right positioning; avoid supine, and Raise foot of bed prior to raising head of bed, to reduce patient sliding down (shear)  Heels: Keep elevated off mattress and Pillows under calves  Protective Dressing: None  Positioning Equipment:None  Chair positioning: Assist patient to reposition hourly and Do NOT use a donut for sitting (this increases pressure to smaller area and creates a higher potential for injury)    If patient has a buttock pressure injury, or high risk for PI use chair cushion or SPS.  Moisture Management: Perineal cleansing /protection: Follow Incontinence Protocol, Avoid brief in bed, Clean and dry skin folds with bathing , and Moisturize dry skin  Under Devices: Inspect skin under all medical devices during skin inspection , Ensure tubes are stabilized without tension, and Ensure patient is not lying on medical devices or equipment when repositioned  Ask provider to discontinue device when no longer needed.       Orders: reviewed     RECOMMEND PRIMARY TEAM ORDER: None, at this time  Education provided: importance of repositioning, plan of care, wound progress, Infection prevention , Moisture management, Hygiene, and Off-loading pressure  Discussed plan of care with: Patient and Nurse  Notify WOC if wound(s) deteriorate.  Nursing to notify the Provider(s) and re-consult the WOC Nurse if new skin concern.    DATA:     Current support surface: Standard  Standard gel mattress (Isoflex)  Containment of urine/stool: Incontinence Protocol and Incontinent pad in bed  BMI: Body mass index is 30.96 kg/m .   Active diet order: Orders Placed This Encounter      Renal Diet (dialysis)       Output:   I/O last 3 completed shifts:  In: 1791 [P.O.:170; NG/GT:410; IV  Piggyback:280]  Out: 3000 [Other:3000]     Labs:   Recent Labs   Lab 04/11/25  0517 04/08/25  0544 04/07/25  0641   ALBUMIN  --   --  3.1*   HGB 8.3*   < > 7.5*   WBC 6.9   < >  --     < > = values in this interval not displayed.     Pressure injury risk assessment:   Sensory Perception: 3-->slightly limited  Moisture: 2-->very moist  Activity: 2-->chairfast  Mobility: 3-->slightly limited  Nutrition: 3-->adequate  Friction and Shear: 1-->problem  Ben Score: 14    Jennifer Carney RN CWCN  Pager no longer is use, please contact through [employer] group: WOGELA Nurse Eva

## 2025-04-11 NOTE — PROVIDER NOTIFICATION
0756: Writer paged Dr. Andrade asking about holding midodrine prior to HD since . He stated to hold midodrine dose. Writer requested hold parameters be placed.

## 2025-04-11 NOTE — PROGRESS NOTES
"[facility]  Hospitalist Progress Note  Owen Andrade MD  04/11/2025    Assessment & Plan   Supriya Herr is a 75 year old female with very complex past medical history including end-stage renal disease on hemodialysis, HFpEF, paroxysmal atrial fibrillation, hypertension, hyperlipidemia, anxiety, depression, chronic anemia, dysphagia status post PEG tube, type 2 diabetes, left traumatic AKA and recent hospitalization with multifactorial respiratory failure now being admitted on 3/27/2025 with abdominal pain and retching. She is found to have e/o constipation. But other evaluation notable for elevated WBC, procal and CRP of unclear etiology.      Pf note, pt with multiple recent hospitalizations. Per last discharge summary:   \"Hospitalized at [facility] from 3/2 - 3/6/2025 with shortness of breath likely multifactorial.  Volume status was difficult to determine, underwent right heart cath on 3/5 showed mildly elevated right atrial and PCWP readings.  Was treated for acute respiratory failure likely from heart failure exacerbation, atrial fibrillation, end-stage renal disease and discharged to care facility with supplemental nocturnal oxygen.\"  \"Prolonged hospitalized at [facility] from 1/8 to 2/26/2025 for acute hypoxic respiratory failure multifactorial, was intubated ( 1/9-1/18), reintubated (1/20 - 1/25).  Then was treated for shock with pressors, influenza A pneumonia, hospital-acquired pneumonia with intravenous antibiotics, steroids, antivirals.  Then also noted to have heart failure exacerbation, recurrent A-fib with RVR, subsequent bradycardia.  Then also noted to have encephalopathy likely from infectious, metabolic etiology and delirium.  During that hospitalization was noted to have dysphagia, pharyngeal edema, was evaluated by ENT on 2/16.  No findings to explain dysphagia.  G-tube was placed by IR on 2/17 and was on nocturnal tube feeds.  Discharged to TCU for rehabilitation.\"   "   Dispo: Pt's daughter prefers her to return home rather than TCU. Pt will need a number of home medical supplies prior to being able to go home. These have been ordered in the discharge navigator. It may take some time for them to be delivered to her home. Appreciate social work and care coordinator help in arranging this. Pt's daughter will also need teaching on how to do tube feedings. IV antibiotics should be arranged through nephrology so she can get them with dialysis.         *4/6 - 4/8 patient had/has been fairly optimized in general, however despite objectively having good O2 sats and CXR 4/6 unremarkable, feels SOB at times and has wanted mask or NC in place. Blood gas repeats compensated. Suspect some degree of anxiety. Updated dtr on phone.   *SW working on obtaining DME for home as patient and dtr hoping for home as above.   - Continue close monitoring and regimen below     Concern of acute hypoxic respiratory failure though mostly normal O2 sats and blood gas fairly bland  *CXRs 4/5 without new overt findings  - O2 as needed, wean as able, redirect and re-assure     E. Faecalis bacteremia  Leukocytosis  Elevated procalcitonin   Elevated CRP  *On admission, WBC elevated to 17.4, Procal elevated to 1.3, and CRP elevated to 39.78.   Unclear infectious source. She has some mild non-specific urinary bladder wall thickening, but with ESRD makes very little urine. Also has a new MIKAYLA 3mm nodule which may be infectious or inflammatory, but no respiratory complaints.   * COVID, flu RSV negative at admission   *UA very abnormal but difficult to interpret in the setting of ESRD. Ultimately Ucx positive for E faecalis. Could be initial source or seeding from bacteremia.  *Bcx positive for E. Faecalis. Unclear source. Concern for endocarditis given how quickly blood cultures are coming back positive  *TTE without vegetation seen.  *TTE completed 4/1 without concern for mass or vegetation.   - ID consulted for  assistance               - Continue IV vancomycin               - Repeat Bcx until cleared, no growth since 3/29               - Per ID, plan to complete 6 week course with IV vancomycin - last dose 5/10  - IV antibiotics should be arranged through nephrology/dialysis.   - WBC remains normal and CRP improving thru 4/8  - CT of chest 4/9 shows: trace interstitial edema, mod left pleural effusion, mild-mod bronchial wall thickening with associated mucus plugging, near complete RML collapse, 3 mm MIKAYLA pulmonary nodule that is new.  Repeat CT in 3 months for follow up of nodule and RML collapse  - start Duonebs Q 4 hours while awake, chest physiotherapy, pulmonary consultation, US guided left thoracentesis, will add IV solumedrol.     Abdominal pain - improved/resolved  Retching, with Hx of GERD   Dysphagia s/p PEG tube (2/1/2025)   Constipation, rectal stool ball   Pt presents to the ED with vague complaints of retching/dry heaving without vomiting and some crampy lower abdominal pain.   * CT scan showed large rectal stool ball without evidence of obstruction. There was also some non-specific mild urinary bladder wall thickening. No other intra-abdominal pathology.   - Unclear exactly what is going on. Pt did not have any retching or other symptoms while I was in the room. Perhaps she has some reflux of tube feeds or other esophageal issue.  - GI consulted for assessment:               - EGD on 3/30 without etiology to explain dysphagia               - XR Esophagram normal  - Continue Protonix 40 mg daily  - Continue bowel regimen to work on the constipation   - Zofran available PRN  * Prior to 4/6, pt still having these bouts of retching/coughing/dry heaving. Resolved lately?  - Will continue to monitor. No additional work-up needed currently.      End-stage renal disease on HD   Dialyzes MWF via L arm fistula. Per prior notes, volume removal has often been limited by intra dialytic hypotension and a fib with RVR    *Noted that patient has been getting short of breath on Sundays in the past and there was discussion of adding a fourth dialysis day on Saturdays. As of now, nephrology challenging dry weight to see if that helps.  - Nephrology consulted   - Continue PTA midodrine MWF before dialysis   - Continue PTA calcitriol, renvela, vit D.      Heart failure with preserved ejection fraction, chronic  Paroxysmal atrial fibrillation on chronic anticoagulation  Hypertension  Hyperlipidemia  Troponin elevation, Chronic Non-ischemic Myocardial Injury due to chronic heart failure with preserved ejection fraction and end-stage renal disease (85->85), no treatment indicated   [PTA medications include: amiodarone 200mg BID, amlodipine 10mg daily, Apixaban 5mg BID, atorvastatin 20mg daily, hydralazine 75mg Q8H,   *TTE 1/21/2025 with LVEF of 60%.  No valvular abnormality   * RHC 3/5/25 showing moderately elevated right and left-sided filling pressure; elevated wedge pressure consistent with pulmonary hypertension  * On admission now, pt has markedly elevated BNP to 51,544 (though historically is in the 20,000-40,000 range), She does have small pleural effusions on CT, but no other overt evidence of volume overload or clinical HF. She is satting well on room air.  - I/Os and daily weights ordered  - continue PTA apixaban 5 mg BID  - continue PTA atorvastatin 20 mg daily  - continue PTA hydralazine 75 mg q8H  - continue PTA amiodarone 200 mg change to daily as per discussion with [facility].   - continue PTA amlodipine 10 m qday  - Volume management per nephrology     Vaginal bleeding   Pt has had intermittent vaginal bleeding this stay. Nursing and pt quite convinced it is in fact vaginal rather than rectal or urethral.   - transvaginal ultrasound ordered for further eval negative for any concerning findings.   - Recommend outpatient Gyn follow-up      Anxiety/depression/insomnia  - Continue PTA zoloft      Anemia of chronic disease  Hemoglobin  9.2 on admission, has trended down since admission to 7.8.   - Hgb 7.7 > 7.6, given 1 unit with post transfusion hgb 8.7     Type II DM  - Continue PTA lantus 15 units BID   - MDSSI      Physical deconditioning from medical illness, senile frailty.  S/p left above-knee amputation, traumatic  Patient from TCU, daughter hopeful pt will be able to discharge back home with services rather than going back to TCU.   - PT/OT consulted   - Will plan for home discharge      Sacrococcygeal Pressure injury, Stage 3, present on admission  Wound care team followed.  Pressure injury prevention.      Obesity with a BMI of 34.  Increase all-cause mortality and morbidity.     Documentation for necessity of medical equipment      Patient needs a Semi Electric bed   Patient has Sacrococcygeal Pressure injury, Stage 3, present on admission and requires positioning of the body to alleviate pain, that cannot be accommodated in an ordinary bed (e.g. pain scale).  Patient also requires protection from further skin break down that cannot be accommodated in an ordinary bed. She also has chronic G-tube feeding due to dysphagia a condition that requires the head of the bed to be elevated more than 30 degrees most of the time,  where pillows or wedges does not meet the patient s needs       Documentation of need for Rene Lift   The patient require the assistance of two persons in order to transfer between wheelchair, bed, commode. Without the use of the Rene lift, the patient will be bed confined.   The patient is unable to be transferred without a Rene lift due to  severe weakness, and lower extremity amputation. One caregiver cannot physically transfer pt. Yes, the lift will fit into all necessary areas in the patient s home       Diet: Adult Formula Drip Feeding: Continuous Grecia trinket Renal Support; Gastrostomy; Goal Rate: 65; mL/hr; From: 6:00 PM; To: 6:00 AM; 3/31: Decrease cycle time from 14 hrs to 12 hrs (6:00 pm to 6:00 am)  Renal  "Diet (dialysis)    DVT Prophylaxis: DOAC  Bradshaw Catheter: Not present  Lines: None     Cardiac Monitoring: ACTIVE order. Indication: Procedural area  Code Status: No CPR- Pre-arrest intubation OK          Clinically Significant Risk Factors         # Hyponatremia: Lowest Na = 134 mmol/L in last 2 days, will monitor as appropriate  # Hypochloremia: Lowest Cl = 94 mmol/L in last 2 days, will monitor as appropriate   # Hypercalcemia: corrected calcium is >10.1, will monitor as appropriate    # Hypoalbuminemia: Lowest albumin = 3.1 g/dL at 4/7/2025  6:41 AM, will monitor as appropriate     # Hypertension: Noted on problem list  # Chronic heart failure with preserved ejection fraction: heart failure noted on problem list and last echo with EF >50%            # DMII: A1C = 6.5 % (Ref range: <5.7 %) within past 6 months   # Obesity: Estimated body mass index is 31.92 kg/m  as calculated from the following:    Height as of this encounter: 1.676 m (5' 6\").    Weight as of this encounter: 89.7 kg (197 lb 12 oz).      # Financial/Environmental Concerns:           Disposition Plan  -- home with home care     Medically Ready for Discharge:   -- anticipate on 4/14    Disposition:     Interval History   -- less anxious, breathing more comfortable  -- had HD today    -Data reviewed today: I reviewed all new labs and imaging over the last 24 hours. I personally reviewed no images or EKG's today.    Physical Exam    , Blood pressure 138/46, pulse 69, temperature 98.4  F (36.9  C), temperature source Oral, resp. rate 26, height 1.676 m (5' 6\"), weight 87 kg (191 lb 12.8 oz), SpO2 100%, not currently breastfeeding.  Vitals:    04/09/25 1539 04/09/25 2000 04/11/25 0500   Weight: 93 kg (205 lb 0.4 oz) 85.5 kg (188 lb 7.9 oz) 87 kg (191 lb 12.8 oz)     Vital Signs with Ranges  Temp:  [97.7  F (36.5  C)-98.8  F (37.1  C)] 98.4  F (36.9  C)  Pulse:  [64-77] 69  Resp:  [16-56] 26  BP: ()/(26-53) 138/46  FiO2 (%):  [30 %] 30 %  SpO2:  " [93 %-100 %] 100 %  I/O's Last 24 hours  I/O last 3 completed shifts:  In: 1511 [P.O.:170; NG/GT:410]  Out: 3000 [Other:3000]    Constitutional: Chronically ill appearing  Respiratory: Diminished at the bases  Cardiovascular: Regular rate and rhythm, normal S1 and S2, and no murmur noted  GI: Normal bowel sounds, soft, non-distended, non-tender  Skin/Integumen: No rashes, no cyanosis, no edema  Other:      Medications   All medications were reviewed.  Current Facility-Administered Medications   Medication Dose Route Frequency Provider Last Rate Last Admin    dextrose 10% infusion   Intravenous Continuous PRN Ekaterina Gayle MD        May take oral meds with a sip of water, the morning of SHAHNAZ procedure.   Does not apply Continuous PRN Ganesh Lawson MD         Current Facility-Administered Medications   Medication Dose Route Frequency Provider Last Rate Last Admin    - MEDICATION INSTRUCTIONS for Dialysis Patients -   Does not apply See Admin Instructions Ekaterina Gayle MD        amiodarone (PACERONE) tablet 200 mg  200 mg Oral or Feeding Tube Daily Owen Andrade MD   200 mg at 04/11/25 1358    amLODIPine (NORVASC) tablet 10 mg  10 mg Oral or Feeding Tube Once per day on Sunday Tuesday Thursday Saturday Ekaterina Gayle MD   10 mg at 04/10/25 0840    apixaban ANTICOAGULANT (ELIQUIS) tablet 5 mg  5 mg Oral or Feeding Tube BID Ekaterina Gayle MD   5 mg at 04/11/25 0808    atorvastatin (LIPITOR) tablet 20 mg  20 mg Oral or Feeding Tube QPM Ekaterina Gayle MD   20 mg at 04/10/25 2054    budesonide (PULMICORT) neb solution 0.5 mg  0.5 mg Nebulization BID Eren Mandel MD        cetirizine (zyrTEC) tablet 10 mg  10 mg Oral or Feeding Tube At Bedtime Ekaterina Gayle MD   10 mg at 04/10/25 2054    formoterol (PERFOROMIST) neb solution 20 mcg  20 mcg Nebulization Q12H Eren Mandel MD        hydrALAZINE (APRESOLINE) tablet 75 mg  75 mg Oral or Feeding Tube Q8H Ekaterina Gayle MD   75 mg  at 04/11/25 0500    insulin aspart (NovoLOG) injection (RAPID ACTING)  1-7 Units Subcutaneous TID AC Blaise Holden MD   1 Units at 04/11/25 0816    insulin aspart (NovoLOG) injection (RAPID ACTING)  1-5 Units Subcutaneous At Bedtime Blaise Holden MD        insulin glargine (LANTUS PEN) injection 12 Units  12 Units Subcutaneous BID Ekaterina Gayle MD   12 Units at 04/11/25 0816    ipratropium - albuterol 0.5 mg/2.5 mg/3 mL (DUONEB) neb solution 3 mL  3 mL Nebulization Q4H WA Owen Andrade MD   3 mL at 04/11/25 1545    midodrine (PROAMATINE) tablet 5 mg  5 mg Oral Once per day on Monday Wednesday Friday Ekaterina Gayle MD   5 mg at 04/09/25 1342    pantoprazole (PROTONIX) 2 mg/mL suspension 40 mg  40 mg Per Feeding Tube QAM AC Ekaterina Gayle MD   40 mg at 04/11/25 0808    polyethylene glycol (MIRALAX) Packet 17 g  17 g Oral or Feeding Tube BID Ekaterina Gayle MD   17 g at 04/10/25 0840    [Held by provider] Prosource TF20 ENfit Compatibl EN LIQD (PROSOURCE TF20) packet 60 mL  1 packet Per Feeding Tube Daily Ekaterina Gayle MD   60 mL at 03/31/25 1407    sennosides (SENOKOT) tablet 2 tablet  2 tablet Oral BID Blaise Holden MD   2 tablet at 04/11/25 1358    sertraline (ZOLOFT) tablet 75 mg  75 mg Oral or Feeding Tube Daily Ekaterina Gayle MD   75 mg at 04/11/25 0808    sevelamer carbonate (RENVELA) tablet 1,600 mg  1,600 mg Oral TID w/meals Ekaterina Gayle MD   1,600 mg at 04/08/25 1835    sodium chloride (PF) 0.9% PF flush 3 mL  3 mL Intravenous Q8H Ganesh Lawson MD   3 mL at 04/10/25 1725    sodium chloride (PF) 0.9% PF flush 3 mL  3 mL Intracatheter Q8H [facility] Ekaterina Gayle MD   3 mL at 04/11/25 0503    vancomycin place cardoso - receiving intermittent dosing  1 each Intravenous See Admin Instructions Ekaterina Gayle MD        vitamin D3 (CHOLECALCIFEROL) tablet 50 mcg  50 mcg Oral or Feeding Tube Daily Ekaterina Gayle MD   50 mcg at 04/11/25  0808        Data   Recent Labs   Lab 04/11/25  1353 04/11/25  0737 04/11/25  0517 04/10/25  1716 04/10/25  1616 04/10/25  0929 04/10/25  0513 04/09/25  1707 04/09/25  1309 04/08/25  0838 04/08/25  0544 04/07/25  0806 04/07/25  0641   WBC  --   --  6.9  --   --   --  5.5  --   --   --  7.1  --   --    HGB  --   --  8.3*  --   --   --  8.7*  --  7.6*  --  7.7*  --  7.5*   MCV  --   --  92  --   --   --  91  --   --   --  95  --   --    PLT  --   --  216  --   --   --  205  --   --   --  199  --   --    NA  --   --  134*  --   --   --  132*  --   --   --   --   --  134*   POTASSIUM  --   --  3.8  --   --   --  3.6  --  4.4  --   --   --  4.7   CHLORIDE  --   --  93*  --   --   --  93*  --   --   --   --   --  94*   CO2  --   --  32*  --   --   --  30*  --   --   --   --   --  29   BUN  --   --  58.8*  --   --   --  33.5*  --   --   --   --   --  77.0*   CR  --   --  3.90*  --   --   --  2.52*  --   --   --   --   --  4.92*   ANIONGAP  --   --  9  --   --   --  9  --   --   --   --   --  11   JODY  --   --  10.4  --   --   --  9.8  --   --   --   --   --  9.9   * 164* 170*   < >  --    < > 150*   < >  --    < >  --    < > 229*   ALBUMIN  --   --   --   --   --   --   --   --   --   --   --   --  3.1*   PROTTOTAL  --   --   --   --  6.0*  --   --   --   --   --   --   --   --     < > = values in this interval not displayed.       No results found for this or any previous visit (from the past 24 hours).      Owen Andrade MD  Text Page  (7am to 6pm)

## 2025-04-11 NOTE — PLAN OF CARE
[id number]    Orientations: AxOx2, disoriented to time and situation. Excitable, intermittently anxious.   Vitals/Pain: VSS on 3LNC / BIPAP 30% overnight.   Lines/Drains: PIV x1 SL. LUE fistula. PEG tube.   Skin/Wounds: Scabbing skin tear to R forearm. L buttock abrasion. Scattered bruises.  GI/: Renal diet, no appetite; TF running overnight. Oliguric, on HD. BM x2, small and soft. Small amounts of bloody vaginal discharge.   Labs: , 179.   Ambulation/Assist: Assist x2 with lift; turn and repo q2h.  Sleep Quality: Sleeping between cares. PRN Atarax 25mg given x2 for sleep/anxiety.    Plan: Dialysis run today. Encourage activity/OOB, PO intake. Will have aggressive respiratory treatments over the weekend (nebs, chest physio) + then repeat goals-of-care discussion on Monday. Current plan is to discharge home with daughter once DME/home O2 acquired-- however pending improvement in respiratory status.

## 2025-04-11 NOTE — PHARMACY-VANCOMYCIN DOSING SERVICE
Pharmacy Vancomycin Note  Date of Service 2025  Patient's  1950   75 year old, female  TBW = 87 kg    Indication: Bacteremia  Day of Therapy: 15 (started 3/28/2025).  Current vancomycin regimen:  intermittent dosing  Current vancomycin monitoring method: Renal Replacement Therapy  Current vancomycin therapeutic monitoring goal: 15-20 mg/L    Current estimated CrCl = Estimated Creatinine Clearance: 13.6 mL/min (A) (based on SCr of 3.9 mg/dL (H)).    Creatinine for last 3 days  4/10/2025:  5:13 AM Creatinine 2.52 mg/dL  2025:  5:17 AM Creatinine 3.90 mg/dL    Recent Vancomycin Levels (past 3 days)  2025:  5:29 AM Vancomycin 23.4 ug/mL  2025:  5:17 AM Vancomycin 29.5 ug/mL    Vancomycin IV Administrations (past 72 hours)                     vancomycin (VANCOCIN) 1,000 mg in 200 mL dextrose intermittent infusion (mg) 1,000 mg New Bag 25                    Nephrotoxins and other renal medications (From now, onward)      Start     Dose/Rate Route Frequency Ordered Stop    25 1400  vancomycin (VANCOCIN) 750 mg in sodium chloride 0.9 % 282.5 mL intermittent infusion         750 mg  over 60 Minutes Intravenous ONCE 25 1159      25 1003  vancomycin place cardoso - receiving intermittent dosing         1 each Intravenous SEE ADMIN INSTRUCTIONS 25 1003                 Contrast Orders - past 72 hours (72h ago, onward)      None            Interpretation of levels and current regimen:  Vancomycin level is reflective of supratherapeutic level (pre-HD)    Renal Function: ESRD on Dialysis    Plan:  Vancomycin 750 mg (~ 7 mg/kg) to be given after dialysis today.  Vancomycin monitoring method: Renal Replacement Therapy  Vancomycin therapeutic monitoring goal: 15-20 mg/L  Pharmacy will check vancomycin levels as appropriate prior to next HD.    Shahnaz Sharma, PharmD

## 2025-04-11 NOTE — PROGRESS NOTES
"Seen in dialysis. She currently feels well and is rather talkative. Yesterday she had a thoracentesis and says she feels better today. She was also noted to have sig mucus plugging of the R middle lobe. Aggressive pulmonary toilet has been initiated. Today her breathing appears comfortable. Blood pressures in HD are in the 120's. Prior to dialysis they have been in the 140-160 range. We are aiming for 3L off.     Vital signs:  Temp: 97.7  F (36.5  C) Temp src: Oral BP: 127/47 Pulse: 65   Resp: 20 SpO2: 100 % O2 Device: Oxymask Oxygen Delivery: 3 LPM Height: 167.6 cm (5' 6\") Weight: 87 kg (191 lb 12.8 oz)  Estimated body mass index is 30.96 kg/m  as calculated from the following:    Height as of this encounter: 1.676 m (5' 6\").    Weight as of this encounter: 87 kg (191 lb 12.8 oz).      Gen; on dialysis. NAD. Awake and alert  Lungs: Clear anterior A few crackles R LL.   Card: regular, occassional PVC  Abd: soft non tender  Ext: No edema R L L BKA  Access: L upper arm fistula with good flows L hand is warm    Last Comprehensive Metabolic Panel:  Lab Results   Component Value Date     (L) 04/11/2025    POTASSIUM 3.8 04/11/2025    CHLORIDE 93 (L) 04/11/2025    CO2 32 (H) 04/11/2025    ANIONGAP 9 04/11/2025     (H) 04/11/2025    BUN 58.8 (H) 04/11/2025    CR 3.90 (H) 04/11/2025    GFRESTIMATED 11 (L) 04/11/2025    JODY 10.4 04/11/2025     Lab Results   Component Value Date    HGB 8.3 (L) 04/11/2025       A/p:  ESKD: dialysis MWF via [facility]. Outpt unit is [facility] with Dr. Lim    2. BMD/Hypercalcemia: Stop the calcitriol (ordered for you). Likely due to immobility Phos was 4.9. She is on sevelamer 1600 tid with meals.     3. Volume: Blood pressures correct with volume removal. We have been pulling 3Kg without issues.    4. Anemia: Hgb is 8.3 and stable. Typically on Mircera but I have switched to EPO given her prolonged hospitalization so we will treat here.     Pt is hoping to go home with her daughter. " This will require equipment and coordination which is being obtained. Today Ms. Herr is clear that she would prefer to be at home with her daughter than in a TCU.     Alysia Roman MD MS

## 2025-04-11 NOTE — PROGRESS NOTES
Potassium   Date Value Ref Range Status   04/11/2025 3.8 3.4 - 5.3 mmol/L Final   02/02/2022 4.7 3.4 - 5.3 mmol/L Final   04/17/2021 4.2 3.4 - 5.3 mmol/L Final     Hemoglobin   Date Value Ref Range Status   04/11/2025 8.3 (L) 11.7 - 15.7 g/dL Final   04/16/2021 10.9 (L) 11.7 - 15.7 g/dL Final     Creatinine   Date Value Ref Range Status   04/11/2025 3.90 (H) 0.51 - 0.95 mg/dL Final   04/17/2021 5.98 (H) 0.52 - 1.04 mg/dL Final     Urea Nitrogen   Date Value Ref Range Status   04/11/2025 58.8 (H) 8.0 - 23.0 mg/dL Final   11/24/2021 37 (H) 7 - 30 mg/dL Final   04/17/2021 68 (H) 7 - 30 mg/dL Final     Sodium   Date Value Ref Range Status   04/11/2025 134 (L) 135 - 145 mmol/L Final   04/17/2021 137 133 - 144 mmol/L Final     INR   Date Value Ref Range Status   03/30/2025 1.47 (H) 0.85 - 1.15 Final   04/16/2021 1.14 0.86 - 1.14 Final       DIALYSIS PROCEDURE NOTE  Hepatitis status of previous patient on machine log was checked and verified ok to use with this patients hepatitis status.  Patient dialyzed for 3.5 hrs. on a K3 bath with a net fluid removal of 3L.  A BFR of 450 ml/min was obtained via a Left AVG using 15 gauge needles.      The treatment plan was discussed with Dr. Roman during the treatment.    Total heparin received during the treatment: 1500 units.   Needle cannulation sites held x 10 min.     Meds given: 10,000 units epoetin given   Complications: None      Person educated: patient. Knowledge base adequate. Barriers to learning: intermittent confusion. Educated on procedure, access care and medication via verbal mode. Patient verbalized understanding.     ICEBOAT? Timeout performed pre-treatment  I: Patient was identified using 2 identifiers  C:  Consent Signed Yes  E: Equipment preventative maintenance is current and dialysis delivery system OK to use  B: Hepatitis B Surface Antigen: Negative; Draw Date: 04/04/2025      Hepatitis B Surface Antibody: Susceptible; Draw Date: 04/04/2025  O: Dialysis  orders present and complete prior to treatment  A: Vascular access verified and assessed prior to treatment  T: Treatment was performed at a clinically appropriate time  ?: Patient was allowed to ask questions and address concerns prior to treatment  See Adult Hemodialysis flowsheet in EPIC for further details and post assessment.  Machine water alarm in place and functioning. Transducer pods intact and checked every 15min.   Pt returned via bed transport.  Chlorine/Chloramine water system checked every 4 hours.  Outpatient Dialysis at [facility] on MWF    Patient repositioned every 2 hours during the treatment.  Post treatment report given to SHANTA Oneill regarding 3L of fluid removed, last BP of 112/42, and patient pain rating of 0/10.     Please remove patient dressing on AVF and AVG needle sites 24 hours after dialysis. If leaking occurs please apply a Band-Aid.

## 2025-04-12 LAB
ANION GAP SERPL CALCULATED.3IONS-SCNC: 11 MMOL/L (ref 7–15)
BUN SERPL-MCNC: 35.9 MG/DL (ref 8–23)
C PNEUM DNA SPEC QL NAA+PROBE: NOT DETECTED
CALCIUM SERPL-MCNC: 10.9 MG/DL (ref 8.8–10.4)
CHLORIDE SERPL-SCNC: 94 MMOL/L (ref 98–107)
CREAT SERPL-MCNC: 2.73 MG/DL (ref 0.51–0.95)
EGFRCR SERPLBLD CKD-EPI 2021: 17 ML/MIN/1.73M2
ERYTHROCYTE [DISTWIDTH] IN BLOOD BY AUTOMATED COUNT: 16.6 % (ref 10–15)
FLUAV H1 2009 PAND RNA SPEC QL NAA+PROBE: NOT DETECTED
FLUAV H1 RNA SPEC QL NAA+PROBE: NOT DETECTED
FLUAV H3 RNA SPEC QL NAA+PROBE: NOT DETECTED
FLUAV RNA SPEC QL NAA+PROBE: NOT DETECTED
FLUBV RNA SPEC QL NAA+PROBE: NOT DETECTED
GLUCOSE BLDC GLUCOMTR-MCNC: 156 MG/DL (ref 70–99)
GLUCOSE BLDC GLUCOMTR-MCNC: 162 MG/DL (ref 70–99)
GLUCOSE BLDC GLUCOMTR-MCNC: 174 MG/DL (ref 70–99)
GLUCOSE BLDC GLUCOMTR-MCNC: 206 MG/DL (ref 70–99)
GLUCOSE BLDC GLUCOMTR-MCNC: 351 MG/DL (ref 70–99)
GLUCOSE SERPL-MCNC: 164 MG/DL (ref 70–99)
HADV DNA SPEC QL NAA+PROBE: NOT DETECTED
HCO3 SERPL-SCNC: 29 MMOL/L (ref 22–29)
HCOV PNL SPEC NAA+PROBE: NOT DETECTED
HCT VFR BLD AUTO: 29.2 % (ref 35–47)
HGB BLD-MCNC: 9.3 G/DL (ref 11.7–15.7)
HMPV RNA SPEC QL NAA+PROBE: NOT DETECTED
HPIV1 RNA SPEC QL NAA+PROBE: NOT DETECTED
HPIV2 RNA SPEC QL NAA+PROBE: NOT DETECTED
HPIV3 RNA SPEC QL NAA+PROBE: DETECTED
HPIV4 RNA SPEC QL NAA+PROBE: NOT DETECTED
M PNEUMO DNA SPEC QL NAA+PROBE: NOT DETECTED
MCH RBC QN AUTO: 28.9 PG (ref 26.5–33)
MCHC RBC AUTO-ENTMCNC: 31.8 G/DL (ref 31.5–36.5)
MCV RBC AUTO: 91 FL (ref 78–100)
PLATELET # BLD AUTO: 239 10E3/UL (ref 150–450)
POTASSIUM SERPL-SCNC: 4.1 MMOL/L (ref 3.4–5.3)
RBC # BLD AUTO: 3.22 10E6/UL (ref 3.8–5.2)
RSV RNA SPEC QL NAA+PROBE: NOT DETECTED
RSV RNA SPEC QL NAA+PROBE: NOT DETECTED
RV+EV RNA SPEC QL NAA+PROBE: NOT DETECTED
SODIUM SERPL-SCNC: 134 MMOL/L (ref 135–145)
WBC # BLD AUTO: 5.8 10E3/UL (ref 4–11)

## 2025-04-12 PROCEDURE — 120N000001 HC R&B MED SURG/OB

## 2025-04-12 PROCEDURE — 99232 SBSQ HOSP IP/OBS MODERATE 35: CPT | Performed by: INTERNAL MEDICINE

## 2025-04-12 PROCEDURE — 80048 BASIC METABOLIC PNL TOTAL CA: CPT | Performed by: INTERNAL MEDICINE

## 2025-04-12 PROCEDURE — 94660 CPAP INITIATION&MGMT: CPT

## 2025-04-12 PROCEDURE — 94640 AIRWAY INHALATION TREATMENT: CPT

## 2025-04-12 PROCEDURE — 94640 AIRWAY INHALATION TREATMENT: CPT | Mod: 76

## 2025-04-12 PROCEDURE — 999N000157 HC STATISTIC RCP TIME EA 10 MIN

## 2025-04-12 PROCEDURE — 250N000009 HC RX 250: Performed by: INTERNAL MEDICINE

## 2025-04-12 PROCEDURE — 250N000013 HC RX MED GY IP 250 OP 250 PS 637: Performed by: INTERNAL MEDICINE

## 2025-04-12 PROCEDURE — 87633 RESP VIRUS 12-25 TARGETS: CPT | Performed by: INTERNAL MEDICINE

## 2025-04-12 PROCEDURE — 250N000012 HC RX MED GY IP 250 OP 636 PS 637: Performed by: INTERNAL MEDICINE

## 2025-04-12 PROCEDURE — 250N000013 HC RX MED GY IP 250 OP 250 PS 637: Performed by: STUDENT IN AN ORGANIZED HEALTH CARE EDUCATION/TRAINING PROGRAM

## 2025-04-12 PROCEDURE — 36415 COLL VENOUS BLD VENIPUNCTURE: CPT | Performed by: INTERNAL MEDICINE

## 2025-04-12 PROCEDURE — 250N000013 HC RX MED GY IP 250 OP 250 PS 637: Performed by: HOSPITALIST

## 2025-04-12 PROCEDURE — 250N000012 HC RX MED GY IP 250 OP 636 PS 637: Performed by: HOSPITALIST

## 2025-04-12 PROCEDURE — 85027 COMPLETE CBC AUTOMATED: CPT | Performed by: INTERNAL MEDICINE

## 2025-04-12 RX ORDER — PREDNISONE 20 MG/1
20 TABLET ORAL DAILY
Status: COMPLETED | OUTPATIENT
Start: 2025-04-12 | End: 2025-04-16

## 2025-04-12 RX ADMIN — BUDESONIDE 0.5 MG: 0.5 INHALANT RESPIRATORY (INHALATION) at 19:15

## 2025-04-12 RX ADMIN — SENNOSIDES 2 TABLET: 8.6 TABLET ORAL at 09:27

## 2025-04-12 RX ADMIN — PREDNISONE 20 MG: 20 TABLET ORAL at 12:44

## 2025-04-12 RX ADMIN — INSULIN ASPART 1 UNITS: 100 INJECTION, SOLUTION INTRAVENOUS; SUBCUTANEOUS at 10:38

## 2025-04-12 RX ADMIN — APIXABAN 5 MG: 5 TABLET, FILM COATED ORAL at 09:27

## 2025-04-12 RX ADMIN — ATORVASTATIN CALCIUM 20 MG: 20 TABLET, FILM COATED ORAL at 20:41

## 2025-04-12 RX ADMIN — INSULIN ASPART 2 UNITS: 100 INJECTION, SOLUTION INTRAVENOUS; SUBCUTANEOUS at 18:17

## 2025-04-12 RX ADMIN — HYDRALAZINE HYDROCHLORIDE 75 MG: 25 TABLET ORAL at 09:27

## 2025-04-12 RX ADMIN — IPRATROPIUM BROMIDE AND ALBUTEROL SULFATE 3 ML: .5; 3 SOLUTION RESPIRATORY (INHALATION) at 08:41

## 2025-04-12 RX ADMIN — SEVELAMER CARBONATE 1600 MG: 800 TABLET, FILM COATED ORAL at 20:41

## 2025-04-12 RX ADMIN — HYDRALAZINE HYDROCHLORIDE 75 MG: 25 TABLET ORAL at 00:21

## 2025-04-12 RX ADMIN — SERTRALINE HYDROCHLORIDE 75 MG: 50 TABLET ORAL at 09:27

## 2025-04-12 RX ADMIN — HYDRALAZINE HYDROCHLORIDE 75 MG: 25 TABLET ORAL at 23:30

## 2025-04-12 RX ADMIN — INSULIN ASPART 4 UNITS: 100 INJECTION, SOLUTION INTRAVENOUS; SUBCUTANEOUS at 21:26

## 2025-04-12 RX ADMIN — HYDRALAZINE HYDROCHLORIDE 75 MG: 25 TABLET ORAL at 17:43

## 2025-04-12 RX ADMIN — AMLODIPINE BESYLATE 10 MG: 10 TABLET ORAL at 09:27

## 2025-04-12 RX ADMIN — SEVELAMER CARBONATE 1600 MG: 800 TABLET, FILM COATED ORAL at 10:38

## 2025-04-12 RX ADMIN — IPRATROPIUM BROMIDE AND ALBUTEROL SULFATE 3 ML: .5; 3 SOLUTION RESPIRATORY (INHALATION) at 19:15

## 2025-04-12 RX ADMIN — BUDESONIDE 0.5 MG: 0.5 INHALANT RESPIRATORY (INHALATION) at 08:41

## 2025-04-12 RX ADMIN — APIXABAN 5 MG: 5 TABLET, FILM COATED ORAL at 20:41

## 2025-04-12 RX ADMIN — FORMOTEROL FUMARATE 20 MCG: 20 SOLUTION RESPIRATORY (INHALATION) at 19:15

## 2025-04-12 RX ADMIN — POLYETHYLENE GLYCOL 3350 17 G: 17 POWDER, FOR SOLUTION ORAL at 20:41

## 2025-04-12 RX ADMIN — Medication 40 MG: at 09:27

## 2025-04-12 RX ADMIN — Medication 50 MCG: at 09:27

## 2025-04-12 RX ADMIN — AMIODARONE HYDROCHLORIDE 200 MG: 200 TABLET ORAL at 09:27

## 2025-04-12 RX ADMIN — INSULIN GLARGINE 12 UNITS: 100 INJECTION, SOLUTION SUBCUTANEOUS at 09:37

## 2025-04-12 RX ADMIN — IPRATROPIUM BROMIDE AND ALBUTEROL SULFATE 3 ML: .5; 3 SOLUTION RESPIRATORY (INHALATION) at 15:10

## 2025-04-12 RX ADMIN — INSULIN ASPART 1 UNITS: 100 INJECTION, SOLUTION INTRAVENOUS; SUBCUTANEOUS at 14:12

## 2025-04-12 RX ADMIN — CETIRIZINE HYDROCHLORIDE 10 MG: 10 TABLET, FILM COATED ORAL at 20:41

## 2025-04-12 RX ADMIN — INSULIN GLARGINE 12 UNITS: 100 INJECTION, SOLUTION SUBCUTANEOUS at 21:26

## 2025-04-12 RX ADMIN — FORMOTEROL FUMARATE 20 MCG: 20 SOLUTION RESPIRATORY (INHALATION) at 08:48

## 2025-04-12 RX ADMIN — IPRATROPIUM BROMIDE AND ALBUTEROL SULFATE 3 ML: .5; 3 SOLUTION RESPIRATORY (INHALATION) at 11:44

## 2025-04-12 ASSESSMENT — ACTIVITIES OF DAILY LIVING (ADL)
ADLS_ACUITY_SCORE: 85
ADLS_ACUITY_SCORE: 84
ADLS_ACUITY_SCORE: 85

## 2025-04-12 NOTE — PLAN OF CARE
Goal Outcome Evaluation:    Orientations: A&O x3, intermittently confused to date and situation  Vitals/Pain: VSS on 3 L, Bipap overnight  Lines/Drains: PIV x1 SL  Skin/Wounds: buttocks wound, dressing CDI  GI/: Oliguric, on HD, small BM x2  Diet: renal, nocturnal TF via G tube  Labs: refused AM labs  Ambulation/Assist: up with lift, turn/repo Q2H, refuses repositioning at times  Sleep Quality: good

## 2025-04-12 NOTE — PLAN OF CARE
Shift Summary [id number]:    VSS on 2L O2 oxymask. A/Ox3, disoriented between situation and time. Denies pain LS dim/coarse, DEVRIES. Fistula to LUE WDL. BM this shift, +BS and passing flatus. Poor appetite on reg diet. TF running in g-tube. B,174,206. Wound care completed per WOCN orders. Up A2 lift. Repo q2h, pt occasionally refuses repos, education provided. Started prednisone today. Resp panel PCR collected (see results). Consulted teams: Pulm, Neph, ID. Dgt at bedside. Discharge tbd. Care plan updated.     See flowsheet for full assessment. See MAR for meds given.    Yahir Oneill RN  6:34 PM    Goal Outcome Evaluation:      Plan of Care Reviewed With: patient    Overall Patient Progress: no change    Problem: Gas Exchange Impaired  Goal: Optimal Gas Exchange  Outcome: Progressing

## 2025-04-12 NOTE — PROVIDER NOTIFICATION
04/12/25 1813   Significant Event   Significant Event Critical result/value   Critical Test Results/Notification   Critical Lab Result (Lab Name and Value) Resp panel PCR + Parainfluenza Virus 3   What Time Did The Lab Notify You? 1813   Provider Notified yes   Date of Provider Notification 04/12/25   Time of Provider Notification 1816   Mechanism of Provider notification page  (AudioBoo)   What Provider Did You Notify? Dr. Oswald   Response aware  (responded by via AudioBoo at 1823)

## 2025-04-12 NOTE — SIGNIFICANT EVENT
Significant Event Note    Time of event: 6:16 PM April 12, 2025    Description of event:  Parainfluenza Virus 3 - Detected    Plan:  --Supportive care (hydration, antipyretics, oxygen if needed).  --Monitor for worsening respiratory symptoms  --Droplet precautions.    Discussed with: bedside nurse    Vinod Oswald MD

## 2025-04-12 NOTE — PROGRESS NOTES
"[facility]  Hospitalist Progress Note  Owen Andrade MD  04/12/2025    Assessment & Plan   Supriya Herr is a 75 year old female with very complex past medical history including end-stage renal disease on hemodialysis, HFpEF, paroxysmal atrial fibrillation, hypertension, hyperlipidemia, anxiety, depression, chronic anemia, dysphagia status post PEG tube, type 2 diabetes, left traumatic AKA and recent hospitalization with multifactorial respiratory failure now being admitted on 3/27/2025 with abdominal pain and retching. She is found to have e/o constipation. But other evaluation notable for elevated WBC, procal and CRP of unclear etiology.      Pf note, pt with multiple recent hospitalizations. Per last discharge summary:   \"Hospitalized at [facility] from 3/2 - 3/6/2025 with shortness of breath likely multifactorial.  Volume status was difficult to determine, underwent right heart cath on 3/5 showed mildly elevated right atrial and PCWP readings.  Was treated for acute respiratory failure likely from heart failure exacerbation, atrial fibrillation, end-stage renal disease and discharged to care facility with supplemental nocturnal oxygen.\"  \"Prolonged hospitalized at [facility] from 1/8 to 2/26/2025 for acute hypoxic respiratory failure multifactorial, was intubated ( 1/9-1/18), reintubated (1/20 - 1/25).  Then was treated for shock with pressors, influenza A pneumonia, hospital-acquired pneumonia with intravenous antibiotics, steroids, antivirals.  Then also noted to have heart failure exacerbation, recurrent A-fib with RVR, subsequent bradycardia.  Then also noted to have encephalopathy likely from infectious, metabolic etiology and delirium.  During that hospitalization was noted to have dysphagia, pharyngeal edema, was evaluated by ENT on 2/16.  No findings to explain dysphagia.  G-tube was placed by IR on 2/17 and was on nocturnal tube feeds.  Discharged to TCU for rehabilitation.\"   "   Dispo: Pt's daughter prefers her to return home rather than TCU. Pt will need a number of home medical supplies prior to being able to go home. These have been ordered in the discharge navigator. It may take some time for them to be delivered to her home. Appreciate social work and care coordinator help in arranging this. Pt's daughter will also need teaching on how to do tube feedings. IV antibiotics should be arranged through nephrology so she can get them with dialysis.         *4/6 - 4/8 patient had/has been fairly optimized in general, however despite objectively having good O2 sats and CXR 4/6 unremarkable, feels SOB at times and has wanted mask or NC in place. Blood gas repeats compensated. Suspect some degree of anxiety. Updated dtr on phone.   *SW working on obtaining DME for home as patient and dtr hoping for home as above.   - Continue close monitoring and regimen below     Concern of acute hypoxic respiratory failure though mostly normal O2 sats and blood gas fairly bland  *CXRs 4/5 without new overt findings  - O2 as needed, wean as able, redirect and re-assure     E. Faecalis bacteremia  Leukocytosis  Elevated procalcitonin   Elevated CRP  *On admission, WBC elevated to 17.4, Procal elevated to 1.3, and CRP elevated to 39.78.   Unclear infectious source. She has some mild non-specific urinary bladder wall thickening, but with ESRD makes very little urine. Also has a new MIKAYLA 3mm nodule which may be infectious or inflammatory, but no respiratory complaints.   * COVID, flu RSV negative at admission   *UA very abnormal but difficult to interpret in the setting of ESRD. Ultimately Ucx positive for E faecalis. Could be initial source or seeding from bacteremia.  *Bcx positive for E. Faecalis. Unclear source. Concern for endocarditis given how quickly blood cultures are coming back positive  *TTE without vegetation seen.  *TTE completed 4/1 without concern for mass or vegetation.   - ID consulted for  assistance               - Continue IV vancomycin               - Repeat Bcx until cleared, no growth since 3/29               - Per ID, plan to complete 6 week course with IV vancomycin - last dose 5/10  - IV antibiotics should be arranged through nephrology/dialysis.   - WBC remains normal and CRP improving thru 4/8  - CT of chest 4/9 shows: trace interstitial edema, mod left pleural effusion, mild-mod bronchial wall thickening with associated mucus plugging, near complete RML collapse, 3 mm MIKAYLA pulmonary nodule that is new.  Repeat CT in 3 months for follow up of nodule and RML collapse  - start Duonebs Q 4 hours while awake, chest physiotherapy, pulmonary consultation, US guided left thoracentesis   - start prednisone 20 mg daily for bronchial congestion on 4/11     Abdominal pain - improved/resolved  Retching, with Hx of GERD   Dysphagia s/p PEG tube (2/1/2025)   Constipation, rectal stool ball   Pt presents to the ED with vague complaints of retching/dry heaving without vomiting and some crampy lower abdominal pain.   * CT scan showed large rectal stool ball without evidence of obstruction. There was also some non-specific mild urinary bladder wall thickening. No other intra-abdominal pathology.   - Unclear exactly what is going on. Pt did not have any retching or other symptoms while I was in the room. Perhaps she has some reflux of tube feeds or other esophageal issue.  - GI consulted for assessment:               - EGD on 3/30 without etiology to explain dysphagia               - XR Esophagram normal  - Continue Protonix 40 mg daily  - Continue bowel regimen to work on the constipation   - Zofran available PRN  * Prior to 4/6, pt still having these bouts of retching/coughing/dry heaving. Resolved lately?  - Will continue to monitor. No additional work-up needed currently.      End-stage renal disease on HD   Dialyzes MWF via L arm fistula. Per prior notes, volume removal has often been limited by intra  dialytic hypotension and a fib with RVR   *Noted that patient has been getting short of breath on Sundays in the past and there was discussion of adding a fourth dialysis day on Saturdays. As of now, nephrology challenging dry weight to see if that helps.  - Nephrology consulted   - Continue PTA midodrine MWF before dialysis   - Continue PTA calcitriol, renvela, vit D.      Heart failure with preserved ejection fraction, chronic  Paroxysmal atrial fibrillation on chronic anticoagulation  Hypertension  Hyperlipidemia  Troponin elevation, Chronic Non-ischemic Myocardial Injury due to chronic heart failure with preserved ejection fraction and end-stage renal disease (85->85), no treatment indicated   [PTA medications include: amiodarone 200mg BID, amlodipine 10mg daily, Apixaban 5mg BID, atorvastatin 20mg daily, hydralazine 75mg Q8H,   *TTE 1/21/2025 with LVEF of 60%.  No valvular abnormality   * RHC 3/5/25 showing moderately elevated right and left-sided filling pressure; elevated wedge pressure consistent with pulmonary hypertension  * On admission now, pt has markedly elevated BNP to 51,544 (though historically is in the 20,000-40,000 range), She does have small pleural effusions on CT, but no other overt evidence of volume overload or clinical HF. She is satting well on room air.  - I/Os and daily weights ordered  - continue PTA apixaban 5 mg BID  - continue PTA atorvastatin 20 mg daily  - continue PTA hydralazine 75 mg q8H  - continue PTA amiodarone 200 mg change to daily as per discussion with [facility].   - continue PTA amlodipine 10 m qday  - Volume management per nephrology     Vaginal bleeding   Pt has had intermittent vaginal bleeding this stay. Nursing and pt quite convinced it is in fact vaginal rather than rectal or urethral.   - transvaginal ultrasound ordered for further eval negative for any concerning findings.   - Recommend outpatient Gyn follow-up      Anxiety/depression/insomnia  - Continue PTA zoloft       Anemia of chronic disease  Hemoglobin 9.2 on admission, has trended down since admission to 7.8.   - Hgb 7.7 > 7.6, given 1 unit with post transfusion hgb 8.7     Type II DM  - Continue PTA lantus 15 units BID   - MDSSI      Physical deconditioning from medical illness, senile frailty.  S/p left above-knee amputation, traumatic  Patient from TCU, daughter hopeful pt will be able to discharge back home with services rather than going back to TCU.   - PT/OT consulted   - Will plan for home discharge      Sacrococcygeal Pressure injury, Stage 3, present on admission  Wound care team followed.  Pressure injury prevention.      Obesity with a BMI of 34.  Increase all-cause mortality and morbidity.     Documentation for necessity of medical equipment      Patient needs a Semi Electric bed   Patient has Sacrococcygeal Pressure injury, Stage 3, present on admission and requires positioning of the body to alleviate pain, that cannot be accommodated in an ordinary bed (e.g. pain scale).  Patient also requires protection from further skin break down that cannot be accommodated in an ordinary bed. She also has chronic G-tube feeding due to dysphagia a condition that requires the head of the bed to be elevated more than 30 degrees most of the time,  where pillows or wedges does not meet the patient s needs       Documentation of need for Rene Lift   The patient require the assistance of two persons in order to transfer between wheelchair, bed, commode. Without the use of the Rene lift, the patient will be bed confined.   The patient is unable to be transferred without a Rene lift due to  severe weakness, and lower extremity amputation. One caregiver cannot physically transfer pt. Yes, the lift will fit into all necessary areas in the patient s home       Diet: Adult Formula Drip Feeding: Continuous [employer] Renal Support; Gastrostomy; Goal Rate: 65; mL/hr; From: 6:00 PM; To: 6:00 AM; 3/31: Decrease cycle time from 14 hrs  "to 12 hrs (6:00 pm to 6:00 am)  Renal Diet (dialysis)    DVT Prophylaxis: DOAC  Bradshaw Catheter: Not present  Lines: None     Cardiac Monitoring: ACTIVE order. Indication: Procedural area  Code Status: No CPR- Pre-arrest intubation OK          Clinically Significant Risk Factors         # Hyponatremia: Lowest Na = 134 mmol/L in last 2 days, will monitor as appropriate  # Hypochloremia: Lowest Cl = 94 mmol/L in last 2 days, will monitor as appropriate   # Hypercalcemia: corrected calcium is >10.1, will monitor as appropriate    # Hypoalbuminemia: Lowest albumin = 3.1 g/dL at 4/7/2025  6:41 AM, will monitor as appropriate     # Hypertension: Noted on problem list  # Chronic heart failure with preserved ejection fraction: heart failure noted on problem list and last echo with EF >50%            # DMII: A1C = 6.5 % (Ref range: <5.7 %) within past 6 months   # Obesity: Estimated body mass index is 31.92 kg/m  as calculated from the following:    Height as of this encounter: 1.676 m (5' 6\").    Weight as of this encounter: 89.7 kg (197 lb 12 oz).      # Financial/Environmental Concerns:           Disposition Plan  -- home with home care     Medically Ready for Discharge:   -- anticipate on 4/14    Disposition:     Interval History   -- having good sputum production and expectoration  -- oxygen level down to 2.5 L  -- continues with flutter valve     -Data reviewed today: I reviewed all new labs and imaging over the last 24 hours. I personally reviewed no images or EKG's today.    Physical Exam    , Blood pressure (!) 168/46, pulse 66, temperature 97.9  F (36.6  C), temperature source Oral, resp. rate 20, height 1.676 m (5' 6\"), weight 85.2 kg (187 lb 13.3 oz), SpO2 96%, not currently breastfeeding.  Vitals:    04/09/25 2000 04/11/25 0500 04/12/25 0545   Weight: 85.5 kg (188 lb 7.9 oz) 87 kg (191 lb 12.8 oz) 85.2 kg (187 lb 13.3 oz)     Vital Signs with Ranges  Temp:  [97.9  F (36.6  C)-99  F (37.2  C)] 97.9  F (36.6 "  C)  Pulse:  [66-76] 66  Resp:  [16-56] 20  BP: ()/(26-54) 168/46  FiO2 (%):  [30 %] 30 %  SpO2:  [96 %-100 %] 96 %  I/O's Last 24 hours  I/O last 3 completed shifts:  In: 2462 [P.O.:320; NG/GT:930; IV Piggyback:280]  Out: 3000 [Other:3000]    Constitutional: Calm, sitting in chair  Respiratory: Diminished at the bases  Cardiovascular: Regular rate and rhythm, normal S1 and S2, and no murmur noted  GI: Normal bowel sounds, soft, non-distended, non-tender  Skin/Integumen: No rashes, no cyanosis, no edema  Other:      Medications   All medications were reviewed.  Current Facility-Administered Medications   Medication Dose Route Frequency Provider Last Rate Last Admin    dextrose 10% infusion   Intravenous Continuous PRN Ekaterina Gayle MD        May take oral meds with a sip of water, the morning of SHAHNAZ procedure.   Does not apply Continuous PRN Ganesh Lawson MD         Current Facility-Administered Medications   Medication Dose Route Frequency Provider Last Rate Last Admin    - MEDICATION INSTRUCTIONS for Dialysis Patients -   Does not apply See Admin Instructions Ekaterina Gayle MD        amiodarone (PACERONE) tablet 200 mg  200 mg Oral or Feeding Tube Daily Owen Andrade MD   200 mg at 04/12/25 0927    amLODIPine (NORVASC) tablet 10 mg  10 mg Oral or Feeding Tube Once per day on Sunday Tuesday Thursday Saturday Ekaterian Gayle MD   10 mg at 04/12/25 0927    apixaban ANTICOAGULANT (ELIQUIS) tablet 5 mg  5 mg Oral or Feeding Tube BID Ekaterina Gayle MD   5 mg at 04/12/25 0927    atorvastatin (LIPITOR) tablet 20 mg  20 mg Oral or Feeding Tube QPM Ekaterina Gayle MD   20 mg at 04/11/25 2101    budesonide (PULMICORT) neb solution 0.5 mg  0.5 mg Nebulization BID Eren Mandel MD   0.5 mg at 04/12/25 0841    cetirizine (zyrTEC) tablet 10 mg  10 mg Oral or Feeding Tube At Bedtime Ekaterina Gayle MD   10 mg at 04/11/25 2101    formoterol (PERFOROMIST) neb solution 20 mcg  20 mcg  Nebulization Q12H Eren Mandel MD   20 mcg at 04/12/25 0848    hydrALAZINE (APRESOLINE) tablet 75 mg  75 mg Oral or Feeding Tube Q8H Ekaterina Gayle MD   75 mg at 04/12/25 0927    insulin aspart (NovoLOG) injection (RAPID ACTING)  1-7 Units Subcutaneous TID AC Blaise Holden MD   1 Units at 04/12/25 1038    insulin aspart (NovoLOG) injection (RAPID ACTING)  1-5 Units Subcutaneous At Bedtime Blaise Holden MD        insulin glargine (LANTUS PEN) injection 12 Units  12 Units Subcutaneous BID Ekaterina Gayle MD   12 Units at 04/12/25 0937    ipratropium - albuterol 0.5 mg/2.5 mg/3 mL (DUONEB) neb solution 3 mL  3 mL Nebulization Q4H WA Owen Andrade MD   3 mL at 04/12/25 1144    midodrine (PROAMATINE) tablet 5 mg  5 mg Oral Once per day on Monday Wednesday Friday Ekaterina Gayle MD   5 mg at 04/09/25 1342    pantoprazole (PROTONIX) 2 mg/mL suspension 40 mg  40 mg Per Feeding Tube QAM AC Ekaterina Gayle MD   40 mg at 04/12/25 0927    polyethylene glycol (MIRALAX) Packet 17 g  17 g Oral or Feeding Tube BID Ekaterina Gayle MD   17 g at 04/11/25 2103    [Held by provider] Prosource TF20 ENfit Compatibl EN LIQD (PROSOURCE TF20) packet 60 mL  1 packet Per Feeding Tube Daily Ekaterina Gayle MD   60 mL at 03/31/25 1407    sennosides (SENOKOT) tablet 2 tablet  2 tablet Oral BID Blaise Holden MD   2 tablet at 04/12/25 0927    sertraline (ZOLOFT) tablet 75 mg  75 mg Oral or Feeding Tube Daily Ekaterina Gayle MD   75 mg at 04/12/25 0927    sevelamer carbonate (RENVELA) tablet 1,600 mg  1,600 mg Oral TID w/meals Ekaterina Gayle MD   1,600 mg at 04/12/25 1038    sodium chloride (PF) 0.9% PF flush 3 mL  3 mL Intravenous Q8H Ganesh Lawson MD   3 mL at 04/12/25 0939    sodium chloride (PF) 0.9% PF flush 3 mL  3 mL Intracatheter Q8H [facility] Ekaterina Gayle MD   3 mL at 04/12/25 0026    vancomycin place cardoso - receiving intermittent dosing  1 each Intravenous See Admin  Instructions Ekaterina Gayle MD        vitamin D3 (CHOLECALCIFEROL) tablet 50 mcg  50 mcg Oral or Feeding Tube Daily Ekaterina Gayle MD   50 mcg at 04/12/25 0927        Data   Recent Labs   Lab 04/12/25  1002 04/12/25  1000 04/12/25  0153 04/11/25  0737 04/11/25  0517 04/10/25  1716 04/10/25  1616 04/10/25  0929 04/10/25  0513 04/07/25  0806 04/07/25  0641   WBC 5.8  --   --   --  6.9  --   --   --  5.5   < >  --    HGB 9.3*  --   --   --  8.3*  --   --   --  8.7*   < > 7.5*   MCV 91  --   --   --  92  --   --   --  91   < >  --      --   --   --  216  --   --   --  205   < >  --    *  --   --   --  134*  --   --   --  132*  --  134*   POTASSIUM 4.1  --   --   --  3.8  --   --   --  3.6   < > 4.7   CHLORIDE 94*  --   --   --  93*  --   --   --  93*  --  94*   CO2 29  --   --   --  32*  --   --   --  30*  --  29   BUN 35.9*  --   --   --  58.8*  --   --   --  33.5*  --  77.0*   CR 2.73*  --   --   --  3.90*  --   --   --  2.52*  --  4.92*   ANIONGAP 11  --   --   --  9  --   --   --  9  --  11   JODY 10.9*  --   --   --  10.4  --   --   --  9.8  --  9.9   * 156* 162*   < > 170*   < >  --    < > 150*   < > 229*   ALBUMIN  --   --   --   --   --   --   --   --   --   --  3.1*   PROTTOTAL  --   --   --   --   --   --  6.0*  --   --   --   --     < > = values in this interval not displayed.       No results found for this or any previous visit (from the past 24 hours).      Owen Andrade MD  Text Page  (7am to 6pm)

## 2025-04-13 LAB
ANION GAP SERPL CALCULATED.3IONS-SCNC: 12 MMOL/L (ref 7–15)
BUN SERPL-MCNC: 55.3 MG/DL (ref 8–23)
CALCIUM SERPL-MCNC: 11 MG/DL (ref 8.8–10.4)
CHLORIDE SERPL-SCNC: 90 MMOL/L (ref 98–107)
CREAT SERPL-MCNC: 3.93 MG/DL (ref 0.51–0.95)
EGFRCR SERPLBLD CKD-EPI 2021: 11 ML/MIN/1.73M2
ERYTHROCYTE [DISTWIDTH] IN BLOOD BY AUTOMATED COUNT: 16.4 % (ref 10–15)
GLUCOSE BLDC GLUCOMTR-MCNC: 233 MG/DL (ref 70–99)
GLUCOSE BLDC GLUCOMTR-MCNC: 242 MG/DL (ref 70–99)
GLUCOSE BLDC GLUCOMTR-MCNC: 265 MG/DL (ref 70–99)
GLUCOSE BLDC GLUCOMTR-MCNC: 272 MG/DL (ref 70–99)
GLUCOSE BLDC GLUCOMTR-MCNC: 299 MG/DL (ref 70–99)
GLUCOSE SERPL-MCNC: 270 MG/DL (ref 70–99)
HCO3 SERPL-SCNC: 28 MMOL/L (ref 22–29)
HCT VFR BLD AUTO: 28.5 % (ref 35–47)
HGB BLD-MCNC: 9.2 G/DL (ref 11.7–15.7)
MCH RBC QN AUTO: 29.1 PG (ref 26.5–33)
MCHC RBC AUTO-ENTMCNC: 32.3 G/DL (ref 31.5–36.5)
MCV RBC AUTO: 90 FL (ref 78–100)
PLATELET # BLD AUTO: 255 10E3/UL (ref 150–450)
POTASSIUM SERPL-SCNC: 4.3 MMOL/L (ref 3.4–5.3)
RBC # BLD AUTO: 3.16 10E6/UL (ref 3.8–5.2)
SODIUM SERPL-SCNC: 130 MMOL/L (ref 135–145)
WBC # BLD AUTO: 5.8 10E3/UL (ref 4–11)

## 2025-04-13 PROCEDURE — 999N000157 HC STATISTIC RCP TIME EA 10 MIN

## 2025-04-13 PROCEDURE — 120N000001 HC R&B MED SURG/OB

## 2025-04-13 PROCEDURE — 94640 AIRWAY INHALATION TREATMENT: CPT | Mod: 76

## 2025-04-13 PROCEDURE — 250N000009 HC RX 250: Performed by: INTERNAL MEDICINE

## 2025-04-13 PROCEDURE — 94640 AIRWAY INHALATION TREATMENT: CPT

## 2025-04-13 PROCEDURE — 85014 HEMATOCRIT: CPT | Performed by: INTERNAL MEDICINE

## 2025-04-13 PROCEDURE — 250N000013 HC RX MED GY IP 250 OP 250 PS 637: Performed by: HOSPITALIST

## 2025-04-13 PROCEDURE — 99232 SBSQ HOSP IP/OBS MODERATE 35: CPT | Performed by: INTERNAL MEDICINE

## 2025-04-13 PROCEDURE — 250N000013 HC RX MED GY IP 250 OP 250 PS 637: Performed by: STUDENT IN AN ORGANIZED HEALTH CARE EDUCATION/TRAINING PROGRAM

## 2025-04-13 PROCEDURE — 250N000012 HC RX MED GY IP 250 OP 636 PS 637: Performed by: INTERNAL MEDICINE

## 2025-04-13 PROCEDURE — 80048 BASIC METABOLIC PNL TOTAL CA: CPT | Performed by: INTERNAL MEDICINE

## 2025-04-13 PROCEDURE — 250N000013 HC RX MED GY IP 250 OP 250 PS 637: Performed by: INTERNAL MEDICINE

## 2025-04-13 PROCEDURE — 36415 COLL VENOUS BLD VENIPUNCTURE: CPT | Performed by: INTERNAL MEDICINE

## 2025-04-13 RX ADMIN — ATORVASTATIN CALCIUM 20 MG: 20 TABLET, FILM COATED ORAL at 20:51

## 2025-04-13 RX ADMIN — FORMOTEROL FUMARATE 20 MCG: 20 SOLUTION RESPIRATORY (INHALATION) at 07:28

## 2025-04-13 RX ADMIN — POLYETHYLENE GLYCOL 3350 17 G: 17 POWDER, FOR SOLUTION ORAL at 20:51

## 2025-04-13 RX ADMIN — APIXABAN 5 MG: 5 TABLET, FILM COATED ORAL at 10:09

## 2025-04-13 RX ADMIN — SEVELAMER CARBONATE 1600 MG: 800 TABLET, FILM COATED ORAL at 18:06

## 2025-04-13 RX ADMIN — Medication 40 MG: at 10:10

## 2025-04-13 RX ADMIN — FORMOTEROL FUMARATE 20 MCG: 20 SOLUTION RESPIRATORY (INHALATION) at 19:12

## 2025-04-13 RX ADMIN — AMIODARONE HYDROCHLORIDE 200 MG: 200 TABLET ORAL at 10:09

## 2025-04-13 RX ADMIN — INSULIN GLARGINE 12 UNITS: 100 INJECTION, SOLUTION SUBCUTANEOUS at 09:05

## 2025-04-13 RX ADMIN — IPRATROPIUM BROMIDE AND ALBUTEROL SULFATE 3 ML: .5; 3 SOLUTION RESPIRATORY (INHALATION) at 19:11

## 2025-04-13 RX ADMIN — SERTRALINE HYDROCHLORIDE 75 MG: 50 TABLET ORAL at 10:09

## 2025-04-13 RX ADMIN — INSULIN ASPART 3 UNITS: 100 INJECTION, SOLUTION INTRAVENOUS; SUBCUTANEOUS at 09:06

## 2025-04-13 RX ADMIN — AMLODIPINE BESYLATE 10 MG: 10 TABLET ORAL at 10:09

## 2025-04-13 RX ADMIN — HYDRALAZINE HYDROCHLORIDE 75 MG: 25 TABLET ORAL at 16:33

## 2025-04-13 RX ADMIN — CETIRIZINE HYDROCHLORIDE 10 MG: 10 TABLET, FILM COATED ORAL at 20:51

## 2025-04-13 RX ADMIN — BUDESONIDE 0.5 MG: 0.5 INHALANT RESPIRATORY (INHALATION) at 19:12

## 2025-04-13 RX ADMIN — IPRATROPIUM BROMIDE AND ALBUTEROL SULFATE 3 ML: .5; 3 SOLUTION RESPIRATORY (INHALATION) at 10:47

## 2025-04-13 RX ADMIN — HYDRALAZINE HYDROCHLORIDE 75 MG: 25 TABLET ORAL at 10:09

## 2025-04-13 RX ADMIN — BUDESONIDE 0.5 MG: 0.5 INHALANT RESPIRATORY (INHALATION) at 07:28

## 2025-04-13 RX ADMIN — Medication 50 MCG: at 10:09

## 2025-04-13 RX ADMIN — IPRATROPIUM BROMIDE AND ALBUTEROL SULFATE 3 ML: .5; 3 SOLUTION RESPIRATORY (INHALATION) at 15:04

## 2025-04-13 RX ADMIN — APIXABAN 5 MG: 5 TABLET, FILM COATED ORAL at 20:51

## 2025-04-13 RX ADMIN — IPRATROPIUM BROMIDE AND ALBUTEROL SULFATE 3 ML: .5; 3 SOLUTION RESPIRATORY (INHALATION) at 07:27

## 2025-04-13 RX ADMIN — INSULIN ASPART 3 UNITS: 100 INJECTION, SOLUTION INTRAVENOUS; SUBCUTANEOUS at 13:11

## 2025-04-13 RX ADMIN — SEVELAMER CARBONATE 1600 MG: 800 TABLET, FILM COATED ORAL at 10:37

## 2025-04-13 RX ADMIN — HYDROXYZINE HYDROCHLORIDE 25 MG: 25 TABLET ORAL at 10:09

## 2025-04-13 RX ADMIN — SENNOSIDES 2 TABLET: 8.6 TABLET ORAL at 10:09

## 2025-04-13 RX ADMIN — HYDRALAZINE HYDROCHLORIDE 75 MG: 25 TABLET ORAL at 23:38

## 2025-04-13 RX ADMIN — PREDNISONE 20 MG: 20 TABLET ORAL at 10:09

## 2025-04-13 ASSESSMENT — ACTIVITIES OF DAILY LIVING (ADL)
ADLS_ACUITY_SCORE: 85

## 2025-04-13 NOTE — PLAN OF CARE
Shift Summary [id number]:    VSS on 1L O2 NC. Wore bipap for approx 1 hour when napping. A/Ox3, disoriented between situation and time. Denies pain. LS dim/coarse, DEVRIES. Fistula to LUE WDL. No BM this shift, +BS and passing flatus. Poor appetite on reg diet. TF rate increased d/t poor PO intake. B,242,233. Lantus increased and novolog switched to high resistance dosing. Mepilex on coccyx wound CDI.. Up A2 lift. Repo q2h. Pt declined going back to bed, stayed in the chair for majority of the day. Weight shifting provided.Contact/Droplet precautions maintained. Consulted teams: Pulm, Neph, ID. Dgt at bedside. HD tomm. Discharge tbd. Care plan updated.     See flowsheet for full assessment. See MAR for meds given.    Yahir Oneill RN    Goal Outcome Evaluation:      Plan of Care Reviewed With: patient    Overall Patient Progress: no change    Problem: Gas Exchange Impaired  Goal: Optimal Gas Exchange  Outcome: Progressing

## 2025-04-13 NOTE — PROGRESS NOTES
"[facility]  Hospitalist Progress Note  Owen Andrade MD  04/13/2025    Assessment & Plan   Supriya Herr is a 75 year old female with very complex past medical history including end-stage renal disease on hemodialysis, HFpEF, paroxysmal atrial fibrillation, hypertension, hyperlipidemia, anxiety, depression, chronic anemia, dysphagia status post PEG tube, type 2 diabetes, left traumatic AKA and recent hospitalization with multifactorial respiratory failure now being admitted on 3/27/2025 with abdominal pain and retching. She is found to have e/o constipation. But other evaluation notable for elevated WBC, procal and CRP of unclear etiology.      Pf note, pt with multiple recent hospitalizations. Per last discharge summary:   \"Hospitalized at [facility] from 3/2 - 3/6/2025 with shortness of breath likely multifactorial.  Volume status was difficult to determine, underwent right heart cath on 3/5 showed mildly elevated right atrial and PCWP readings.  Was treated for acute respiratory failure likely from heart failure exacerbation, atrial fibrillation, end-stage renal disease and discharged to care facility with supplemental nocturnal oxygen.\"  \"Prolonged hospitalized at [facility] from 1/8 to 2/26/2025 for acute hypoxic respiratory failure multifactorial, was intubated ( 1/9-1/18), reintubated (1/20 - 1/25).  Then was treated for shock with pressors, influenza A pneumonia, hospital-acquired pneumonia with intravenous antibiotics, steroids, antivirals.  Then also noted to have heart failure exacerbation, recurrent A-fib with RVR, subsequent bradycardia.  Then also noted to have encephalopathy likely from infectious, metabolic etiology and delirium.  During that hospitalization was noted to have dysphagia, pharyngeal edema, was evaluated by ENT on 2/16.  No findings to explain dysphagia.  G-tube was placed by IR on 2/17 and was on nocturnal tube feeds.  Discharged to TCU for rehabilitation.\"   "   Dispo: Pt's daughter prefers her to return home rather than TCU. Pt will need a number of home medical supplies prior to being able to go home. These have been ordered in the discharge navigator. It may take some time for them to be delivered to her home. Appreciate social work and care coordinator help in arranging this. Pt's daughter will also need teaching on how to do tube feedings. IV antibiotics should be arranged through nephrology so she can get them with dialysis.         *4/6 - 4/8 patient had/has been fairly optimized in general, however despite objectively having good O2 sats and CXR 4/6 unremarkable, feels SOB at times and has wanted mask or NC in place. Blood gas repeats compensated. Suspect some degree of anxiety. Updated dtr on phone.   *SW working on obtaining DME for home as patient and dtr hoping for home as above.   - Continue close monitoring and regimen below     Concern of acute hypoxic respiratory failure though mostly normal O2 sats and blood gas fairly bland  *CXRs 4/5 without new overt findings  - O2 as needed, wean as able, redirect and re-assure     E. Faecalis sepsis  Parainfluenza bronchitis  Elevated procalcitonin   Elevated CRP  *On admission, WBC elevated to 17.4, Procal elevated to 1.3, and CRP elevated to 39.78.   Unclear infectious source. She has some mild non-specific urinary bladder wall thickening, but with ESRD makes very little urine. Also has a new MIKAYLA 3mm nodule which may be infectious or inflammatory, but no respiratory complaints.   * COVID, flu RSV negative at admission   *UA very abnormal but difficult to interpret in the setting of ESRD. Ultimately Ucx positive for E faecalis. Could be initial source or seeding from bacteremia.  *Bcx positive for E. Faecalis. Unclear source. Concern for endocarditis given how quickly blood cultures are coming back positive  *TTE without vegetation seen.  *TTE completed 4/1 without concern for mass or vegetation.   - ID consulted  for assistance               - Continue IV vancomycin               - Repeat Bcx until cleared, no growth since 3/29               - Per ID, plan to complete 6 week course with IV vancomycin - last dose 5/10  - IV antibiotics should be arranged through nephrology/dialysis.   - WBC remains normal and CRP improving thru 4/8  - CT of chest 4/9 shows: trace interstitial edema, mod left pleural effusion, mild-mod bronchial wall thickening with associated mucus plugging, near complete RML collapse, 3 mm MIKAYLA pulmonary nodule that is new.  Repeat CT in 3 months for follow up of nodule and RML collapse  - continue Duonebs Q 4 hours while awake, chest physiotherapy, pulmonary consultation, US guided left thoracentesis produced 150 ml fluid  - continue prednisone 20 mg daily for bronchial congestion on 4/11     Abdominal pain - improved/resolved  Retching, with Hx of GERD   Dysphagia s/p PEG tube (2/1/2025)   Constipation, rectal stool ball   Pt presents to the ED with vague complaints of retching/dry heaving without vomiting and some crampy lower abdominal pain.   * CT scan showed large rectal stool ball without evidence of obstruction. There was also some non-specific mild urinary bladder wall thickening. No other intra-abdominal pathology.   - Unclear exactly what is going on. Pt did not have any retching or other symptoms while I was in the room. Perhaps she has some reflux of tube feeds or other esophageal issue.  - GI consulted for assessment:               - EGD on 3/30 without etiology to explain dysphagia               - XR Esophagram normal  - Continue Protonix 40 mg daily  - Continue bowel regimen to work on the constipation   - Zofran available PRN  * Prior to 4/6, pt still having these bouts of retching/coughing/dry heaving. Resolved lately?  - Will continue to monitor. No additional work-up needed currently.      End-stage renal disease on HD   Dialyzes MWF via L arm fistula. Per prior notes, volume removal has  often been limited by intra dialytic hypotension and a fib with RVR   *Noted that patient has been getting short of breath on Sundays in the past and there was discussion of adding a fourth dialysis day on Saturdays. As of now, nephrology challenging dry weight to see if that helps.  - Nephrology consulted   - Continue PTA midodrine MWF before dialysis   - Continue PTA calcitriol, renvela, vit D.      Heart failure with preserved ejection fraction, chronic  Paroxysmal atrial fibrillation on chronic anticoagulation  Hypertension  Hyperlipidemia  Troponin elevation, Chronic Non-ischemic Myocardial Injury due to chronic heart failure with preserved ejection fraction and end-stage renal disease (85->85), no treatment indicated   [PTA medications include: amiodarone 200mg BID, amlodipine 10mg daily, Apixaban 5mg BID, atorvastatin 20mg daily, hydralazine 75mg Q8H,   *TTE 1/21/2025 with LVEF of 60%.  No valvular abnormality   * RHC 3/5/25 showing moderately elevated right and left-sided filling pressure; elevated wedge pressure consistent with pulmonary hypertension  * On admission now, pt has markedly elevated BNP to 51,544 (though historically is in the 20,000-40,000 range), She does have small pleural effusions on CT, but no other overt evidence of volume overload or clinical HF. She is satting well on room air.  - I/Os and daily weights ordered  - continue PTA apixaban 5 mg BID  - continue PTA atorvastatin 20 mg daily  - continue PTA hydralazine 75 mg q8H  - continue PTA amiodarone 200 mg change to daily as per discussion with [facility].   - continue PTA amlodipine 10 m qday  - Volume management per nephrology     Vaginal bleeding   Pt has had intermittent vaginal bleeding this stay. Nursing and pt quite convinced it is in fact vaginal rather than rectal or urethral.   - transvaginal ultrasound ordered for further eval negative for any concerning findings.   - Recommend outpatient Gyn follow-up       Anxiety/depression/insomnia  - Continue PTA zoloft      Anemia of chronic disease  Hemoglobin 9.2 on admission, has trended down since admission to 7.8.   - Hgb 7.7 > 7.6, given 1 unit with post transfusion hgb 8.7 > 9.3     Type II DM  - Continue PTA lantus 15 units BID   - will change from mod SSI to high SSI with prednisone     Physical deconditioning from medical illness, senile frailty.  S/p left above-knee amputation, traumatic  Patient from TCU, daughter hopeful pt will be able to discharge back home with services rather than going back to TCU.   - PT/OT consulted   - Will plan for home discharge      Sacrococcygeal Pressure injury, Stage 3, present on admission  Wound care team followed.  Pressure injury prevention.      Obesity with a BMI of 34.  Increase all-cause mortality and morbidity.     Documentation for necessity of medical equipment      Patient needs a Semi Electric bed   Patient has Sacrococcygeal Pressure injury, Stage 3, present on admission and requires positioning of the body to alleviate pain, that cannot be accommodated in an ordinary bed (e.g. pain scale).  Patient also requires protection from further skin break down that cannot be accommodated in an ordinary bed. She also has chronic G-tube feeding due to dysphagia a condition that requires the head of the bed to be elevated more than 30 degrees most of the time,  where pillows or wedges does not meet the patient s needs       Documentation of need for Rene Lift   The patient require the assistance of two persons in order to transfer between wheelchair, bed, commode. Without the use of the Rene lift, the patient will be bed confined.   The patient is unable to be transferred without a Rene lift due to  severe weakness, and lower extremity amputation. One caregiver cannot physically transfer pt. Yes, the lift will fit into all necessary areas in the patient s home       Diet: Adult Formula Drip Feeding: Continuous ExactTarget Renal  "Support; Gastrostomy; Goal Rate: 65; mL/hr; From: 6:00 PM; To: 6:00 AM; 3/31: Decrease cycle time from 14 hrs to 12 hrs (6:00 pm to 6:00 am)  Renal Diet (dialysis)    DVT Prophylaxis: DOAC  Bradshaw Catheter: Not present  Lines: None     Cardiac Monitoring: ACTIVE order. Indication: Procedural area  Code Status: No CPR- Pre-arrest intubation OK          Clinically Significant Risk Factors         # Hyponatremia: Lowest Na = 134 mmol/L in last 2 days, will monitor as appropriate  # Hypochloremia: Lowest Cl = 94 mmol/L in last 2 days, will monitor as appropriate   # Hypercalcemia: corrected calcium is >10.1, will monitor as appropriate    # Hypoalbuminemia: Lowest albumin = 3.1 g/dL at 4/7/2025  6:41 AM, will monitor as appropriate     # Hypertension: Noted on problem list  # Chronic heart failure with preserved ejection fraction: heart failure noted on problem list and last echo with EF >50%            # DMII: A1C = 6.5 % (Ref range: <5.7 %) within past 6 months   # Obesity: Estimated body mass index is 31.92 kg/m  as calculated from the following:    Height as of this encounter: 1.676 m (5' 6\").    Weight as of this encounter: 89.7 kg (197 lb 12 oz).      # Financial/Environmental Concerns:           Disposition Plan  -- home with home care     Medically Ready for Discharge:   -- anticipate on 4/14    Disposition:     Interval History   -- poor  oral intake per RN  -- oxygen level down to 1L  -- nasal swab overnight + parainfluenza virus    -Data reviewed today: I reviewed all new labs and imaging over the last 24 hours. I personally reviewed no images or EKG's today.    Physical Exam    , Blood pressure (!) 167/56, pulse 73, temperature 99.5  F (37.5  C), temperature source Tympanic, resp. rate 20, height 1.676 m (5' 6\"), weight 86 kg (189 lb 9.5 oz), SpO2 97%, not currently breastfeeding.  Vitals:    04/11/25 0500 04/12/25 0545 04/13/25 0550   Weight: 87 kg (191 lb 12.8 oz) 85.2 kg (187 lb 13.3 oz) 86 kg (189 lb 9.5 " oz)     Vital Signs with Ranges  Temp:  [98.4  F (36.9  C)-99.5  F (37.5  C)] 99.5  F (37.5  C)  Pulse:  [73] 73  Resp:  [18-20] 20  BP: (156-167)/(48-56) 167/56  FiO2 (%):  [30 %] 30 %  SpO2:  [96 %-99 %] 97 %  I/O's Last 24 hours  I/O last 3 completed shifts:  In: 855 [P.O.:340; I.V.:6; NG/GT:180]  Out: -     Constitutional: Calm, sitting in chair  Respiratory: Diminished at the bases  Cardiovascular: Regular rate and rhythm, normal S1 and S2, and no murmur noted  GI: Normal bowel sounds, soft, non-distended, non-tender  Skin/Integumen: No rashes, no cyanosis, no edema  Other:      Medications   All medications were reviewed.  Current Facility-Administered Medications   Medication Dose Route Frequency Provider Last Rate Last Admin    dextrose 10% infusion   Intravenous Continuous PRN Ekaterina Gayle MD        May take oral meds with a sip of water, the morning of SHAHNAZ procedure.   Does not apply Continuous PRN Ganesh Lawson MD         Current Facility-Administered Medications   Medication Dose Route Frequency Provider Last Rate Last Admin    - MEDICATION INSTRUCTIONS for Dialysis Patients -   Does not apply See Admin Instructions Ekaterina Gayle MD        amiodarone (PACERONE) tablet 200 mg  200 mg Oral or Feeding Tube Daily Owen Andrade MD   200 mg at 04/13/25 1009    amLODIPine (NORVASC) tablet 10 mg  10 mg Oral or Feeding Tube Once per day on Sunday Tuesday Thursday Saturday Ekaterina Gayle MD   10 mg at 04/13/25 1009    apixaban ANTICOAGULANT (ELIQUIS) tablet 5 mg  5 mg Oral or Feeding Tube BID Ekaterina Gayle MD   5 mg at 04/13/25 1009    atorvastatin (LIPITOR) tablet 20 mg  20 mg Oral or Feeding Tube QPM Ekaterina Gayle MD   20 mg at 04/12/25 2041    budesonide (PULMICORT) neb solution 0.5 mg  0.5 mg Nebulization BID Eren Mandel MD   0.5 mg at 04/13/25 0728    cetirizine (zyrTEC) tablet 10 mg  10 mg Oral or Feeding Tube At Bedtime Ekaterina Gayle MD   10 mg at 04/12/25  2041    formoterol (PERFOROMIST) neb solution 20 mcg  20 mcg Nebulization Q12H Eren Mandel MD   20 mcg at 04/13/25 0728    hydrALAZINE (APRESOLINE) tablet 75 mg  75 mg Oral or Feeding Tube Q8H Ekaterina Gayle MD   75 mg at 04/13/25 1009    insulin aspart (NovoLOG) injection (RAPID ACTING)  1-7 Units Subcutaneous TID  Blaise Holden MD   3 Units at 04/13/25 1311    insulin aspart (NovoLOG) injection (RAPID ACTING)  1-5 Units Subcutaneous At Bedtime Blaise Holden MD   4 Units at 04/12/25 2126    insulin glargine (LANTUS PEN) injection 12 Units  12 Units Subcutaneous BID Ekaterina Gayle MD   12 Units at 04/13/25 0905    ipratropium - albuterol 0.5 mg/2.5 mg/3 mL (DUONEB) neb solution 3 mL  3 mL Nebulization Q4H WA Owen Andrade MD   3 mL at 04/13/25 1047    midodrine (PROAMATINE) tablet 5 mg  5 mg Oral Once per day on Monday Wednesday Friday Ekaterina Gayle MD   5 mg at 04/09/25 1342    pantoprazole (PROTONIX) 2 mg/mL suspension 40 mg  40 [facility] Ekaterina Gayle MD   40 mg at 04/13/25 1010    polyethylene glycol (MIRALAX) Packet 17 g  17 g Oral or Feeding Tube BID Ekaterina Gayle MD   17 g at 04/12/25 2041    predniSONE (DELTASONE) tablet 20 mg  20 mg Oral Daily Owen Andrade MD   20 mg at 04/13/25 1009    [Held by provider] Prosource TF20 ENfit Compatibl EN LIQD (PROSOURCE TF20) packet 60 mL  1 packet Per Feeding Tube Daily Ekaterina Gayle MD   60 mL at 03/31/25 1407    sennosides (SENOKOT) tablet 2 tablet  2 tablet Oral BID Blaise Holden MD   2 tablet at 04/13/25 1009    sertraline (ZOLOFT) tablet 75 mg  75 mg Oral or Feeding Tube Daily Ekaterina Gayle MD   75 mg at 04/13/25 1009    sevelamer carbonate (RENVELA) tablet 1,600 mg  1,600 mg Oral TID w/meals Ekaterina Gayle MD   1,600 mg at 04/13/25 1037    sodium chloride (PF) 0.9% PF flush 3 mL  3 mL Intravenous Q8H Ganesh Lawson MD   3 mL at 04/12/25 1821    sodium chloride  (PF) 0.9% PF flush 3 mL  3 mL Intracatheter Q8H VÍCTOR Ekaterina Gayle MD   3 mL at 04/13/25 1010    vancomycin place cardoso - receiving intermittent dosing  1 each Intravenous See Admin Instructions Ekaterina Gayle MD        vitamin D3 (CHOLECALCIFEROL) tablet 50 mcg  50 mcg Oral or Feeding Tube Daily Ekaterina Gayle MD   50 mcg at 04/13/25 1009        Data   Recent Labs   Lab 04/13/25  1228 04/13/25  0825 04/13/25  0540 04/12/25  1251 04/12/25  1002 04/11/25  0737 04/11/25  0517 04/10/25  1716 04/10/25  1616 04/07/25  0806 04/07/25  0641   WBC  --   --  5.8  --  5.8  --  6.9  --   --    < >  --    HGB  --   --  9.2*  --  9.3*  --  8.3*  --   --    < > 7.5*   MCV  --   --  90  --  91  --  92  --   --    < >  --    PLT  --   --  255  --  239  --  216  --   --    < >  --    NA  --   --  130*  --  134*  --  134*  --   --    < > 134*   POTASSIUM  --   --  4.3  --  4.1  --  3.8  --   --    < > 4.7   CHLORIDE  --   --  90*  --  94*  --  93*  --   --    < > 94*   CO2  --   --  28  --  29  --  32*  --   --    < > 29   BUN  --   --  55.3*  --  35.9*  --  58.8*  --   --    < > 77.0*   CR  --   --  3.93*  --  2.73*  --  3.90*  --   --    < > 4.92*   ANIONGAP  --   --  12  --  11  --  9  --   --    < > 11   JODY  --   --  11.0*  --  10.9*  --  10.4  --   --    < > 9.9   * 272* 270*   < > 164*   < > 170*   < >  --    < > 229*   ALBUMIN  --   --   --   --   --   --   --   --   --   --  3.1*   PROTTOTAL  --   --   --   --   --   --   --   --  6.0*  --   --     < > = values in this interval not displayed.       No results found for this or any previous visit (from the past 24 hours).      Owen Andrade MD  Text Page  (7am to 6pm)

## 2025-04-13 NOTE — PLAN OF CARE
Goal Outcome Evaluation:    Orientations: A&O x3, intermittently confused/forgetful  Vitals/Pain: VSS on 2 L, anxious about Bipap overnight only tolerated from 5347-6698  Lines/Drains: PIV x1 SL  Skin/Wounds: buttocks wound, dressing CDI  GI/: Oliguric, on HD, small/smear BM x1  Thick, pinkish/brown vaginal discharge noted  Diet: renal, nocturnal TF via G tube  Labs: Na 130, Cr 3.93  BG ACHS, 351, 299  Ambulation/Assist: up with lift, turn/repo Q2H, refuses repositioning at times  Sleep Quality: fair

## 2025-04-13 NOTE — PROGRESS NOTES
"Brief nephrology note    Ms. Herr is a 76 yo woman with ESKD, pulmonary HTN, CAD and repeated admissions for SOB/anxiety. Dialysis MWF. . Now diagnosed with parainfluenza virus 3. She is on droplet precautions.     Vital signs:  Temp: 99.5  F (37.5  C) Temp src: Tympanic BP: (!) 167/56 Pulse: 73   Resp: 20 SpO2: 95 % O2 Device: Nasal cannula Oxygen Delivery: 1 LPM Height: 167.6 cm (5' 6\") Weight: 86 kg (189 lb 9.5 oz)  Estimated body mass index is 30.6 kg/m  as calculated from the following:    Height as of this encounter: 1.676 m (5' 6\").    Weight as of this encounter: 86 kg (189 lb 9.5 oz).      Plan dialysis tomorrow. Orders in.     Alysia Roman MD MS  "

## 2025-04-13 NOTE — CONSULTS
CLINICAL NUTRITION SERVICES -     Received consult for TF adjustment.     RN reports patient has been refusing meals or eating minimally (eg 1 sausage link at mealtimes). Weight down to 85.2 kg yesterday, cannot rule out weight loss to inadequate nutrition -vs- fluid fluctuations in setting of dialysis.     Recommendations  - Increase TF rate from 65 to 80 ml/hr x 12 hrs/day    [employer] Renal @ 80 ml/hr x 12 hrs/day to provide 960 ml, 1728 kcal (26 kcal/kg), 77 g protein (1.2 g/kg), 163 g CHO, 15 g fiber, 643 ml free water daily. This meets 100% minimum nutrition needs.     Kristina Adair, RD, LD, CNSC   Available on [employer]

## 2025-04-14 LAB
ANION GAP SERPL CALCULATED.3IONS-SCNC: 10 MMOL/L (ref 7–15)
BUN SERPL-MCNC: 82.4 MG/DL (ref 8–23)
CALCIUM SERPL-MCNC: 10.7 MG/DL (ref 8.8–10.4)
CHLORIDE SERPL-SCNC: 87 MMOL/L (ref 98–107)
CREAT SERPL-MCNC: 5.15 MG/DL (ref 0.51–0.95)
EGFRCR SERPLBLD CKD-EPI 2021: 8 ML/MIN/1.73M2
GLUCOSE BLDC GLUCOMTR-MCNC: 204 MG/DL (ref 70–99)
GLUCOSE BLDC GLUCOMTR-MCNC: 243 MG/DL (ref 70–99)
GLUCOSE BLDC GLUCOMTR-MCNC: 264 MG/DL (ref 70–99)
GLUCOSE BLDC GLUCOMTR-MCNC: 344 MG/DL (ref 70–99)
GLUCOSE BLDC GLUCOMTR-MCNC: 92 MG/DL (ref 70–99)
GLUCOSE SERPL-MCNC: 267 MG/DL (ref 70–99)
HCO3 SERPL-SCNC: 29 MMOL/L (ref 22–29)
HGB BLD-MCNC: 8.6 G/DL (ref 11.7–15.7)
MAGNESIUM SERPL-MCNC: 2.4 MG/DL (ref 1.7–2.3)
PHOSPHATE SERPL-MCNC: 2.9 MG/DL (ref 2.5–4.5)
POTASSIUM SERPL-SCNC: 4.4 MMOL/L (ref 3.4–5.3)
SODIUM SERPL-SCNC: 126 MMOL/L (ref 135–145)
VANCOMYCIN SERPL-MCNC: 27 UG/ML

## 2025-04-14 PROCEDURE — 80048 BASIC METABOLIC PNL TOTAL CA: CPT | Performed by: STUDENT IN AN ORGANIZED HEALTH CARE EDUCATION/TRAINING PROGRAM

## 2025-04-14 PROCEDURE — 80202 ASSAY OF VANCOMYCIN: CPT | Performed by: STUDENT IN AN ORGANIZED HEALTH CARE EDUCATION/TRAINING PROGRAM

## 2025-04-14 PROCEDURE — 258N000003 HC RX IP 258 OP 636: Performed by: INTERNAL MEDICINE

## 2025-04-14 PROCEDURE — 94640 AIRWAY INHALATION TREATMENT: CPT | Mod: 76

## 2025-04-14 PROCEDURE — 36415 COLL VENOUS BLD VENIPUNCTURE: CPT | Performed by: INTERNAL MEDICINE

## 2025-04-14 PROCEDURE — 36415 COLL VENOUS BLD VENIPUNCTURE: CPT | Performed by: STUDENT IN AN ORGANIZED HEALTH CARE EDUCATION/TRAINING PROGRAM

## 2025-04-14 PROCEDURE — 634N000001 HC RX 634: Mod: JZ | Performed by: INTERNAL MEDICINE

## 2025-04-14 PROCEDURE — 85018 HEMOGLOBIN: CPT | Performed by: INTERNAL MEDICINE

## 2025-04-14 PROCEDURE — 250N000011 HC RX IP 250 OP 636: Performed by: STUDENT IN AN ORGANIZED HEALTH CARE EDUCATION/TRAINING PROGRAM

## 2025-04-14 PROCEDURE — 250N000011 HC RX IP 250 OP 636: Performed by: INTERNAL MEDICINE

## 2025-04-14 PROCEDURE — 250N000012 HC RX MED GY IP 250 OP 636 PS 637: Performed by: STUDENT IN AN ORGANIZED HEALTH CARE EDUCATION/TRAINING PROGRAM

## 2025-04-14 PROCEDURE — 90937 HEMODIALYSIS REPEATED EVAL: CPT

## 2025-04-14 PROCEDURE — 250N000009 HC RX 250: Performed by: INTERNAL MEDICINE

## 2025-04-14 PROCEDURE — 99233 SBSQ HOSP IP/OBS HIGH 50: CPT | Performed by: STUDENT IN AN ORGANIZED HEALTH CARE EDUCATION/TRAINING PROGRAM

## 2025-04-14 PROCEDURE — 94640 AIRWAY INHALATION TREATMENT: CPT

## 2025-04-14 PROCEDURE — 999N000157 HC STATISTIC RCP TIME EA 10 MIN

## 2025-04-14 PROCEDURE — 83735 ASSAY OF MAGNESIUM: CPT | Performed by: STUDENT IN AN ORGANIZED HEALTH CARE EDUCATION/TRAINING PROGRAM

## 2025-04-14 PROCEDURE — 94660 CPAP INITIATION&MGMT: CPT

## 2025-04-14 PROCEDURE — 250N000013 HC RX MED GY IP 250 OP 250 PS 637: Performed by: STUDENT IN AN ORGANIZED HEALTH CARE EDUCATION/TRAINING PROGRAM

## 2025-04-14 PROCEDURE — 120N000001 HC R&B MED SURG/OB

## 2025-04-14 PROCEDURE — 250N000013 HC RX MED GY IP 250 OP 250 PS 637: Performed by: INTERNAL MEDICINE

## 2025-04-14 PROCEDURE — 84100 ASSAY OF PHOSPHORUS: CPT | Performed by: STUDENT IN AN ORGANIZED HEALTH CARE EDUCATION/TRAINING PROGRAM

## 2025-04-14 PROCEDURE — 90935 HEMODIALYSIS ONE EVALUATION: CPT | Performed by: INTERNAL MEDICINE

## 2025-04-14 RX ORDER — HEPARIN SODIUM 1000 [USP'U]/ML
500 INJECTION, SOLUTION INTRAVENOUS; SUBCUTANEOUS
Status: COMPLETED | OUTPATIENT
Start: 2025-04-14 | End: 2025-04-14

## 2025-04-14 RX ORDER — VANCOMYCIN HYDROCHLORIDE 500 MG/10ML
500 INJECTION, POWDER, LYOPHILIZED, FOR SOLUTION INTRAVENOUS ONCE
Status: COMPLETED | OUTPATIENT
Start: 2025-04-14 | End: 2025-04-14

## 2025-04-14 RX ADMIN — CETIRIZINE HYDROCHLORIDE 10 MG: 10 TABLET, FILM COATED ORAL at 20:59

## 2025-04-14 RX ADMIN — APIXABAN 5 MG: 5 TABLET, FILM COATED ORAL at 20:58

## 2025-04-14 RX ADMIN — EPOETIN ALFA-EPBX 10000 UNITS: 10000 INJECTION, SOLUTION INTRAVENOUS; SUBCUTANEOUS at 09:36

## 2025-04-14 RX ADMIN — IPRATROPIUM BROMIDE AND ALBUTEROL SULFATE 3 ML: .5; 3 SOLUTION RESPIRATORY (INHALATION) at 14:47

## 2025-04-14 RX ADMIN — BUDESONIDE 0.5 MG: 0.5 INHALANT RESPIRATORY (INHALATION) at 08:10

## 2025-04-14 RX ADMIN — FORMOTEROL FUMARATE 20 MCG: 20 SOLUTION RESPIRATORY (INHALATION) at 08:10

## 2025-04-14 RX ADMIN — IPRATROPIUM BROMIDE AND ALBUTEROL SULFATE 3 ML: .5; 3 SOLUTION RESPIRATORY (INHALATION) at 08:10

## 2025-04-14 RX ADMIN — HEPARIN SODIUM 500 UNITS: 1000 INJECTION, SOLUTION INTRAVENOUS; SUBCUTANEOUS at 09:29

## 2025-04-14 RX ADMIN — HYDRALAZINE HYDROCHLORIDE 75 MG: 25 TABLET ORAL at 16:21

## 2025-04-14 RX ADMIN — Medication: at 09:22

## 2025-04-14 RX ADMIN — SERTRALINE HYDROCHLORIDE 75 MG: 50 TABLET ORAL at 14:01

## 2025-04-14 RX ADMIN — ATORVASTATIN CALCIUM 20 MG: 20 TABLET, FILM COATED ORAL at 20:59

## 2025-04-14 RX ADMIN — Medication: at 09:36

## 2025-04-14 RX ADMIN — AMIODARONE HYDROCHLORIDE 200 MG: 200 TABLET ORAL at 08:18

## 2025-04-14 RX ADMIN — IPRATROPIUM BROMIDE AND ALBUTEROL SULFATE 3 ML: .5; 3 SOLUTION RESPIRATORY (INHALATION) at 20:29

## 2025-04-14 RX ADMIN — LIDOCAINE HYDROCHLORIDE 0.5 ML: 10 INJECTION, SOLUTION EPIDURAL; INFILTRATION; INTRACAUDAL; PERINEURAL at 09:22

## 2025-04-14 RX ADMIN — VANCOMYCIN HYDROCHLORIDE 500 MG: 500 INJECTION, POWDER, LYOPHILIZED, FOR SOLUTION INTRAVENOUS at 20:57

## 2025-04-14 RX ADMIN — APIXABAN 5 MG: 5 TABLET, FILM COATED ORAL at 08:18

## 2025-04-14 RX ADMIN — Medication 40 MG: at 08:18

## 2025-04-14 RX ADMIN — SODIUM CHLORIDE 500 ML: 9 INJECTION, SOLUTION INTRAVENOUS at 09:21

## 2025-04-14 RX ADMIN — HYDRALAZINE HYDROCHLORIDE 75 MG: 25 TABLET ORAL at 23:41

## 2025-04-14 RX ADMIN — PREDNISONE 20 MG: 20 TABLET ORAL at 14:01

## 2025-04-14 RX ADMIN — BUDESONIDE 0.5 MG: 0.5 INHALANT RESPIRATORY (INHALATION) at 20:29

## 2025-04-14 RX ADMIN — LIDOCAINE HYDROCHLORIDE 0.5 ML: 10 INJECTION, SOLUTION EPIDURAL; INFILTRATION; INTRACAUDAL; PERINEURAL at 09:36

## 2025-04-14 RX ADMIN — SEVELAMER CARBONATE 1600 MG: 800 TABLET, FILM COATED ORAL at 14:00

## 2025-04-14 RX ADMIN — Medication 50 MCG: at 14:01

## 2025-04-14 RX ADMIN — SEVELAMER CARBONATE 1600 MG: 800 TABLET, FILM COATED ORAL at 18:30

## 2025-04-14 ASSESSMENT — ACTIVITIES OF DAILY LIVING (ADL)
ADLS_ACUITY_SCORE: 88
ADLS_ACUITY_SCORE: 85
ADLS_ACUITY_SCORE: 89
ADLS_ACUITY_SCORE: 89
ADLS_ACUITY_SCORE: 85
ADLS_ACUITY_SCORE: 85
ADLS_ACUITY_SCORE: 89
ADLS_ACUITY_SCORE: 85
ADLS_ACUITY_SCORE: 88
ADLS_ACUITY_SCORE: 89
ADLS_ACUITY_SCORE: 89
ADLS_ACUITY_SCORE: 85
ADLS_ACUITY_SCORE: 88
ADLS_ACUITY_SCORE: 85
ADLS_ACUITY_SCORE: 85

## 2025-04-14 NOTE — PROGRESS NOTES
CLINICAL NUTRITION SERVICES - REASSESSMENT NOTE     Registered Dietitian Interventions:  - continue TF overnight to meet 100% of estimated needs.     Future/Additional Recommendations:  - Consider diet liberalization in the setting of negligible oral intakes.      INFORMATION OBTAINED  Assessed patient in room.    CURRENT NUTRITION ORDERS  Diet: Renal (Dialysis)    Nutrition Support Enteral:  Type of Feeding Tube: GT  Enteral Frequency: Cycled (6 pm - 6 am)   Enteral Regimen: [employer] Renal @ 80 ml/hr x 12 hours   Total Enteral Provisions: 960 ml, 1728 kcal (26 kcal/kg), 77 g protein (1.2 g/kg), 163 g CHO, 15 g fiber, 643 ml free water daily   Free Water Flush: 60 ml before before and after each feeding.     CURRENT INTAKE/TOLERANCE  - Negligible PO.   - TF increased to meet 100% estimated nutrient needs yesterday.   - Pt disliked the Nepro supplement I sent last week. She is frustrated by her diet, nothing tastes good to her.      NEW FINDINGS  GI symptoms:  Daily stooling.      Skin/wounds: Buttock wound   Nutrition-relevant labs:   Na 126 (L)  BUN 82.4 (H), Cr 5.15 (H), GFR 8 (L), Mg 2.4 (H) - HD today.   Recent Labs   Lab 04/14/25  1320 04/14/25  0736 04/14/25  0638 04/14/25  0150 04/13/25  2215 04/13/25  1802   GLC 92 264* 267* 243* 265* 233*     Nutrition-relevant medications:   High sliding scale insulin + Lantus 15 units BID   Vitamin D3 2000 units daily   Weight: 87.9 kg - has been trending low the past few days with lowest wt of admission measured 4/12 - 85.2 kg. 7.8% wt loss indicated since 2/26/25  Wt Readings from Last 10 Encounters:   04/14/25 87.9 kg (193 lb 12.6 oz)   03/25/25 92.5 kg (204 lb)   03/17/25 92.7 kg (204 lb 5.9 oz)   03/06/25 101.8 kg (224 lb 6.9 oz)   02/26/25 92.5 kg (203 lb 14.8 oz)   06/14/24 101.1 kg (222 lb 14.2 oz)   06/03/24 101.1 kg (222 lb 14.2 oz)   04/24/24 101.1 kg (222 lb 14.2 oz)   02/05/24 95.8 kg (211 lb 3.2 oz)   04/21/23 98 kg (216 lb)     ASSESSED NUTRITION  NEEDS  Dosing Weight: 66.8 kg, based on adjusted wt based on lowest, admit wt  Estimated Energy Needs: 9496-7345 kcals/day (25 - 35 kcals/kg)  Justification: HD  Estimated Protein Needs:  grams protein/day (1.2 - 1.8 grams of pro/kg)  Justification: HD (w/ DM)  Estimated Fluid Needs: 1 mL/kcal   Justification: Per provider pending fluid status     MALNUTRITION  % Intake: Decreased intake does not meet criteria - EN providing >% of estimated needs.   % Weight Loss: > 7.5% in 3 months (severe)   Subcutaneous Fat Loss:  Global moderate from prolonged admission   Muscle Loss:  Global moderate from prolonged admission   Fluid Accumulation/Edema: None noted  Malnutrition Diagnosis: Moderate malnutrition in the context of acute illness or injury  Malnutrition Present on Admission: No    EVALUATION OF THE PROGRESS TOWARD GOALS   Previous Goals  TF @ goal to provide >75% of estimated needs while PO intake remains poor.   Evaluation: Met    Previous Nutrition Diagnosis  Inadequate oral intake related to poor appetite, diet restriction to renal as evidenced by negligible PO with ongoing reliance on TF to meet >75% estimated needs.   Evaluation: No change    NUTRITION DIAGNOSIS  Inadequate oral intake related to poor appetite, diet restrictions as evidenced by poor oral intake ongoing, weight loss of >7% in <2 months, fat and muscle wasting.     INTERVENTIONS  See nutrition interventions above    GOALS  TF to provide % estimated needs while PO remains poor.      MONITORING/EVALUATION  Progress toward goals will be monitored and evaluated per policy.    Ladonna Wallace RD, LD  Pager: [phone]  Available on [employer]

## 2025-04-14 NOTE — PLAN OF CARE
Goal Outcome Evaluation:    Orientations: A&O x3, intermittently confused/forgetful of time/situation  Vitals/Pain: VSS on 1 L, Bipap overnight  Lines/Drains: PIV x1 SL  Skin/Wounds: buttocks wound, dressing CDI  GI/: No urine output, on HD, small/smear BM x1  Small amount of thick, pinkish/brown vaginal discharge  Diet: renal, nocturnal TF via G tube  Labs: AM lab results pending   BG ACHS, 265, 243  Ambulation/Assist: up with lift, turn/repo  Sleep Quality: good  Plan: dialysis today

## 2025-04-14 NOTE — PLAN OF CARE
Goal Outcome Evaluation:       Summary: Parainfluenza, respiratory failure-Dialysis (MWF), abdominal pain, Sepsis, CHF   DATE & TIME: 4/14/25 4283-0921    Cognitive Concerns/ Orientation : A&Ox4   BEHAVIOR & AGGRESSION TOOL COLOR: Green   CIWA SCORE: N/a    ABNL VS/O2: VSS, RA  MOBILITY: Total lift, left AKA  PAIN MANAGMENT: Denies  DIET: Renal, TF 80 hr (6 pm-6 am), takes pills whole with water   BOWEL/BLADDER: No BM- voids only small amounts at times   ABNL LAB/BG: , Hemoglobin 8.6, CR 5.15  DRAIN/DEVICES: PIV SL  TELEMETRY RHYTHM: N/a  SKIN: Blanchable redness on coccyx, Mepilex in place, Wound care orders in computer, turn and reposition every 2 hours   TESTS/PROCEDURES: None  D/C DAY/GOALS/PLACE: pending improvement to home with bruno   OTHER IMPORTANT INFO: Lungs coarse, productive cough, on scheduled NEBS, bipap at night, using IS and Ac apella. Left fistula

## 2025-04-14 NOTE — PROGRESS NOTES
DIALYSIS PROCEDURE NOTE  Hepatitis status of previous patient on machine log was checked and verified ok to use with this patients hepatitis status.  Patient dialyzed for 3.5 hrs. on a K3 bath with a net fluid removal of  3L.  A BFR of 400-450 ml/min was obtained via a left AVF using 15 gauge needles.      The treatment plan was discussed with Dr. Oneill during the treatment.    Total heparin received during the treatment: 1400 units.   Needle cannulation sites held x 10 min.        Meds  given: epo 10,000 units   Complications: At beginning of run SBP greater than 200, asymptomatic, MD bedside and notified, no changes made to orders.      Person educated: patient. Knowledge base substantial. Barriers to learning: none. Educated on procedure via verbal mode. The patient verbalized understanding. Pt prefers verbal education style.      ICEBOAT? Timeout performed pre-treatment  I: Patient was identified using 2 identifiers  C:  Consent Signed Yes  E: Equipment preventative maintenance is current and dialysis delivery system OK to use  B: Hepatitis B Surface Antigen: Negative; Draw Date: 4/4/25      Hepatitis B Surface Antibody: Susceptible; Draw Date: 4/4/25  O: Dialysis orders present and complete prior to treatment  A: Vascular access verified and assessed prior to treatment  T: Treatment was performed at a clinically appropriate time  ?: Patient was allowed to ask questions and address concerns prior to treatment  See Adult Hemodialysis flowsheet in TRData for further details and post assessment.  Machine water alarm in place and functioning. Transducer pods intact and checked every 15min.   Pt returned via bed.  Chlorine/Chloramine water system checked every 4 hours.  Outpatient Dialysis at [facility] on MWF.     Patient repositioned every 2 hours during the treatment.  Post treatment report given to ELAINE Couch RN regarding 3L of fluid removed, last BP of 181/53, and patient pain rating of 0/10.      Please  remove patient dressing on AVF and AVG needle sites 24 hours after dialysis. If leaking occurs please apply a Band-Aid.

## 2025-04-14 NOTE — PHARMACY-ADMISSION MEDICATION HISTORY
Pharmacy Vancomycin Note  Date of Service 2025  Patient's  1950   75 year old, female    Indication: Bacteremia  Day of Therapy: Started on 3/28/25  Current vancomycin regimen: Intermittent Dosing from Vancomycin  Current vancomycin monitoring method: Renal Replacement Therapy  Current vancomycin therapeutic monitoring goal: 15-20 mg/L    Current estimated CrCl = Estimated Creatinine Clearance: 10.4 mL/min (A) (based on SCr of 5.15 mg/dL (H)).    Creatinine for last 3 days  2025: 10:02 AM Creatinine 2.73 mg/dL  2025:  5:40 AM Creatinine 3.93 mg/dL  2025:  6:38 AM Creatinine 5.15 mg/dL    Recent Vancomycin Levels (past 3 days)  2025:  6:38 AM Vancomycin 27.0 ug/mL    Vancomycin IV Administrations (past 72 hours)                     vancomycin (VANCOCIN) 750 mg in sodium chloride 0.9 % 282.5 mL intermittent infusion (mg) 750 mg New Bag 25 1441                    Nephrotoxins and other renal medications (From now, onward)      Start     Dose/Rate Route Frequency Ordered Stop    25 1600  vancomycin (VANCOCIN) 500 mg vial to attach to  mL bag         500 mg  over 1 Hours Intravenous ONCE 25 0809      25 1003  vancomycin place cardoso - receiving intermittent dosing         1 each Intravenous SEE ADMIN INSTRUCTIONS 25 1003                 Contrast Orders - past 72 hours (72h ago, onward)      None            Interpretation of levels and current regimen:  Vancomycin level is reflective of supratherapeutic level. Pre-HD    Renal Function: ESRD on Dialysis    Plan:  Vancomycin 500 mg IV once for dose 5 - 7 mg/kg for pre-HD level of 27 (25 -  29.9).  Vancomycin monitoring method: Renal Replacement Therapy  Vancomycin therapeutic monitoring goal: 15-20 mg/L  Pharmacy will check vancomycin levels as appropriate prior to next dialysis session.    Bladimir Greene, [facility]

## 2025-04-14 NOTE — PLAN OF CARE
Goal Outcome Evaluation:  Orientations: A&Ox3, disoriented to time  Vitals/Pain: VSS on RA. Denies pain.   Tele: No tele  Lines/Drains: Fistula to LUE. PIV SL. Peg tube.   Skin/Wounds: Abrasion to coccyx, mepi in place. Scattered bruising. L AKA.   GI/: Renal diet. Nocturnal tube feeds 6p-6a. Small BM smear today. No UOP, on HD.   Labs: Abnormal/Trends, Electrolyte Replacement- none  Ambulation/Assist: Ax2 lift, T/R in bed  Sleep Quality: Fair  Plan: Had dialysis today with 3 kg pulled off. Continue IV abx. Plan to go home with family at discharge. CC/ SW following for planning of discharge.

## 2025-04-14 NOTE — PROGRESS NOTES
"[facility]  Hospitalist Progress Note    Assessment & Plan   Supriya Herr is a 75 year old female with very complex past medical history including end-stage renal disease on hemodialysis, HFpEF, paroxysmal atrial fibrillation, hypertension, hyperlipidemia, anxiety, depression, chronic anemia, dysphagia status post PEG tube, type 2 diabetes, left traumatic AKA and recent hospitalization with multifactorial respiratory failure who was admitted on 3/27/2025 with abdominal pain and retching.      Pf note, pt with multiple recent hospitalizations.     \"Hospitalized at [facility] from 3/2 - 3/6/2025 with shortness of breath likely multifactorial.  Volume status was difficult to determine, underwent right heart cath on 3/5 showed mildly elevated right atrial and PCWP readings.  Was treated for acute respiratory failure likely from heart failure exacerbation, atrial fibrillation, end-stage renal disease and discharged to care facility with supplemental nocturnal oxygen.\"    \"Prolonged hospitalized at [facility] from 1/8 to 2/26/2025 for acute hypoxic respiratory failure multifactorial, was intubated ( 1/9-1/18), reintubated (1/20 - 1/25).  Then was treated for shock with pressors, influenza A pneumonia, hospital-acquired pneumonia with intravenous antibiotics, steroids, antivirals.  Then also noted to have heart failure exacerbation, recurrent A-fib with RVR, subsequent bradycardia.  Then also noted to have encephalopathy likely from infectious, metabolic etiology and delirium.  During that hospitalization was noted to have dysphagia, pharyngeal edema, was evaluated by ENT on 2/16.  No findings to explain dysphagia.  G-tube was placed by IR on 2/17 and was on nocturnal tube feeds.  Discharged to TCU for rehabilitation.\"     Dispo: Pt's daughter prefers her to return home rather than TCU. Pt will need a number of home medical supplies prior to being able to go home. These have been ordered in the discharge " navigator. It may take some time for them to be delivered to her home. Appreciate social work and care coordinator help in arranging this. Pt's daughter will also need teaching on how to do tube feedings. IV antibiotics should be arranged through nephrology so she can get them with dialysis.      Acute Respiratory Failure likely 2/2 Parainfluenza Bronchitis    Near RML Collapse   3mm Left Upper Pulmonary Nodule   From 4/6-4/8/25 had been fairly optimized in general, however despite objectively having good O2 sats and CXR 4/6 unremarkable, feels SOB at times and has wanted mask or NC in place. Blood gas repeats compensated. CT chest on 4/9/25 showed mild to moderate diffuse bronchial wall thickening, with associated mucus plugging in peripheral subsegmental branches, near complete right middle lobe collapse, new from prior and without obstructing endobronchial lesion identified. On 4/12/25 she was diagnosed with parainfluenza. She was started on prednisone 20 mg daily for bronchial congestion     - Spo2 > 90%    - Wean as able     - 4/10/25 US guided left thoracentesis produced 150 ml fluid    - Continue Duonebs Q4 hours while awake, chest physiotherapy    - Continue prednisone 20mg daily for bronchial congestion on 4/11   - Plan for 5 day course     - Pulmonary consulted     Sepsis 2/2 E. Faecalis Bacteremia   Elevated Procalcitonin   Elevated CRP  On admission, WBC elevated to 17.4, Procal elevated to 1.3, and CRP elevated to 39.78.   Unclear infectious source. She has some mild non-specific urinary bladder wall thickening, but with ESRD makes very little urine. Also has a new MIKAYLA 3mm nodule which may be infectious or inflammatory, but no respiratory complaints. COVID, flu RSV negative at admission. UA very abnormal but difficult to interpret in the setting of ESRD. Ultimately Ucx positive for E faecalis. Could be initial source or seeding from bacteremia.    - 3/27/25 Blood Cx: 4/4 bottles positive for  Enterococcus faecalis   - 3/28/25 Blood Cx: 3/4 bottles positive for Enterococcus faecalis   - 3/29/25-3/30/25 Blood Cx: No growth     - 3/29/25 TTE without vegetation seen   - 4/1/25 SHAHNAZ without concern for mass or vegetation    - ID consulted and peripherally following                - Continue IV vancomycin    - Plan to complete 6 week course with IV vancomycin - last dose 5/10/25               - Nephrology to place orders for Vancomycin to be given at dialysis upon discharge.    - Will need blood cultures repeated one week after completing IV antibiotics    - Will arrange outpatient follow-up with ID in 2-3 weeks once patient discharges      Abdominal Pain: Improved/Resolved  Retching, with Hx of GERD   Dysphagia s/p PEG tube (2/1/2025)   Constipation, rectal stool ball   Pt presented to the ED with vague complaints of retching/dry heaving without vomiting and some crampy lower abdominal pain. CT scan showed large rectal stool ball without evidence of obstruction. There was also some non-specific mild urinary bladder wall thickening. No other intra-abdominal pathology.   - Unclear exactly what is going on. Perhaps she has some reflux of tube feeds or other esophageal issue.  - GI consulted                - EGD on 3/30 without etiology to explain dysphagia               - XR Esophagram normal  - Continue Protonix 40 mg daily  - Continue bowel regimen to work on the constipation   - Zofran available PRN  - Will continue to monitor. No additional work-up needed currently.      ESRD on HD   Dialyzes MWF via L arm fistula. Per prior notes, volume removal has often been limited by intra dialytic hypotension and a fib with RVR. Noted that patient has been getting short of breath on Sundays in the past and there was discussion of adding a fourth dialysis day on Saturdays. As of now, nephrology challenging dry weight to see if that helps.  - Nephrology consulted and following   - Continue PTA midodrine MWF before  dialysis   - Continue PTA Renvela, Vit D.      HFpEF  Paroxysmal atrial fibrillation on chronic anticoagulation  Hypertension  Hyperlipidemia  Troponin elevation, Chronic Non-ischemic Myocardial Injury due to chronic heart failure with preserved ejection fraction and end-stage renal disease (85->85), no treatment indicated   On admission now, pt has markedly elevated BNP to 51,544 (though historically is in the 20,000-40,000 range), She does have small pleural effusions on CT, but no other overt evidence of volume overload or clinical HF.  *TTE 1/21/2025 with LVEF of 60%.  No valvular abnormality   * RHC 3/5/25 showing moderately elevated right and left-sided filling pressure; elevated wedge pressure consistent with pulmonary hypertension  - I/Os and daily weights ordered    - continue PTA apixaban 5 mg BID  - continue PTA atorvastatin 20 mg daily  - continue PTA hydralazine 75 mg q8H  - continue PTA amiodarone 200 mg change to daily as per discussion with [facility].   - continue PTA amlodipine 10 m qday    - Volume management per nephrology     Vaginal Bleeding   Pt has had intermittent vaginal bleeding this stay. Nursing and pt quite convinced it is in fact vaginal rather than rectal or urethral.   - Transvaginal ultrasound ordered for further eval negative for any concerning findings.   - Recommend outpatient Gyn follow-up      Anxiety/Depression/Insomnia  - Continue PTA zoloft      Anemia of Chronic Disease  Hemoglobin 9.2 on admission, has trended down since admission to 7.8.   - Hgb 7.7 > 7.6 > 8.7 > 9.3 > 8.6    - S/p 1U pRBC on 4/9/25  - Continue to monitor      Type II DM  - Continue PTA lantus 15 units BID   - Continue HDSSI     Physical deconditioning from medical illness, senile frailty.  S/p left above-knee amputation, traumatic  Patient from TCU, daughter hopeful pt will be able to discharge back home with services rather than going back to TCU.   - PT/OT consulted   - Will plan for home discharge     "  Sacrococcygeal Pressure injury, Stage 3, present on admission  Wound care team followed.  Pressure injury prevention.      Obesity with a BMI of 34.  Increase all-cause mortality and morbidity.    Clinically Significant Risk Factors         # Hyponatremia: Lowest Na = 126 mmol/L in last 2 days, will monitor as appropriate  # Hypochloremia: Lowest Cl = 87 mmol/L in last 2 days, will monitor as appropriate   # Hypercalcemia: Highest Ca = 11 mg/dL in last 2 days, will monitor as appropriate    # Hypoalbuminemia: Lowest albumin = 3.1 g/dL at 4/7/2025  6:41 AM, will monitor as appropriate     # Hypertension: Noted on problem list    # Chronic heart failure with preserved ejection fraction: heart failure noted on problem list and last echo with EF >50%          # DMII: A1C = 6.5 % (Ref range: <5.7 %) within past 6 months   # Obesity: Estimated body mass index is 31.28 kg/m  as calculated from the following:    Height as of this encounter: 1.676 m (5' 6\").    Weight as of this encounter: 87.9 kg (193 lb 12.6 oz).   # Moderate Malnutrition: based on nutrition assessment and treatment provided per dietitian's recommendations.      # Financial/Environmental Concerns:             Diet: Renal Diet (dialysis)  Adult Formula Drip Feeding: Wavestream Renal Support; Gastrostomy; Goal Rate: 80; mL/hr; From: 6:00 PM; To: 6:00 AM; 4/13: Increase TF rate to 80 ml/hr x 12 hrs.     DVT Prophylaxis: DOAC   Bradshaw Catheter: Not present  Lines: None     Cardiac Monitoring: None  Code Status: No CPR- Pre-arrest intubation OK      Disposition Plan       Expected Discharge Date: 04/16/2025,  3:00 PM    Destination: home with family;inpatient rehabilitation facility  Discharge Comments: From [facility]  SW following,   IV abx plan per ID      Entered: Debbie Zarco MD 04/14/2025, 12:55 PM     Notes Reviewed: Nephrology, previous notes    Family Updated: Spoke Caroline Gray via phone on 4/14/25     Care Team Updated: CM and " Nursing updated     Disposition: Potentially in 1-2 days pending improvement in oxygen needs and Pulmonary recs. Per CM patient's daughter needs a 24 hour notice before discharge. CM will check in on DME.       Medically Ready for Discharge: Anticipated Tomorrow        Debbie Zarco MD  Hospitalist Service   [facility]  Securely message with the Vocera Web Console (learn more here)         Medical Decision Making       60 MINUTES SPENT BY ME on the date of service doing chart review, history, exam, documentation & further activities per the note.         Interval History      No acute overnight events.    This morning the patient states that she is doing okay. States that her breathing has improved. Has some coughing.     -Data reviewed today: I reviewed all new labs and imaging results over the last 24 hours.   Physical Exam   Temp: 98.8  F (37.1  C) Temp src: Oral BP: (!) 181/53 Pulse: 61   Resp: 25 SpO2: 95 % O2 Device: Nasal cannula Oxygen Delivery: 2 LPM  Vitals:    04/12/25 0545 04/13/25 0550 04/14/25 0458   Weight: 85.2 kg (187 lb 13.3 oz) 86 kg (189 lb 9.5 oz) 87.9 kg (193 lb 12.6 oz)     Vital Signs with Ranges  Temp:  [97.5  F (36.4  C)-98.8  F (37.1  C)] 98.8  F (37.1  C)  Pulse:  [61-75] 61  Resp:  [11-30] 25  BP: (148-214)/(45-75) 181/53  FiO2 (%):  [30 %] 30 %  SpO2:  [95 %-100 %] 95 %  I/O last 3 completed shifts:  In: 1952 [P.O.:410; I.V.:3; NG/GT:200]  Out: -       Constitutional: Awake, alert, cooperative, no apparent distress.    HEENT: PERRL, Normocephalic, without obvious abnormality, atraumatic, oral pharynx with moist mucus membranes  Pulmonary: Diminished lung sounds in the bases  Cardiovascular: Regular rate and rhythm, normal S1 and S2  GI: Normal bowel sounds, soft, non-distended, non-tender.  Skin/Integumen: Visualized skin appeared clear.  Neuro: CN II-XII grossly intact.  Psych:  Alert and oriented x 3.  Extremities: No lower extremity edema  noted      Medications   Current Facility-Administered Medications   Medication Dose Route Frequency Provider Last Rate Last Admin    dextrose 10% infusion   Intravenous Continuous PRN Ekaterina Gayle MD        May take oral meds with a sip of water, the morning of SHAHNAZ procedure.   Does not apply Continuous PRN Ganesh Lawson MD        Stop Heparin 60 minutes before end of treatment   Does not apply Continuous PRN Alysia Roman MD   Given at 04/14/25 0936     Current Facility-Administered Medications   Medication Dose Route Frequency Provider Last Rate Last Admin    - MEDICATION INSTRUCTIONS for Dialysis Patients -   Does not apply See Admin Instructions Ekaterina Gayle MD        amiodarone (PACERONE) tablet 200 mg  200 mg Oral or Feeding Tube Daily Owen Andrade MD   200 mg at 04/14/25 0818    amLODIPine (NORVASC) tablet 10 mg  10 mg Oral or Feeding Tube Once per day on Sunday Tuesday Thursday Saturday Ekaterina Gayle MD   10 mg at 04/13/25 1009    apixaban ANTICOAGULANT (ELIQUIS) tablet 5 mg  5 mg Oral or Feeding Tube BID Ekaterina Gayle MD   5 mg at 04/14/25 0818    atorvastatin (LIPITOR) tablet 20 mg  20 mg Oral or Feeding Tube QPM Ekaterina Gayle MD   20 mg at 04/13/25 2051    budesonide (PULMICORT) neb solution 0.5 mg  0.5 mg Nebulization BID Eren Mandel MD   0.5 mg at 04/14/25 0810    cetirizine (zyrTEC) tablet 10 mg  10 mg Oral or Feeding Tube At Bedtime Ekaterina Gayle MD   10 mg at 04/13/25 2051    formoterol (PERFOROMIST) neb solution 20 mcg  20 mcg Nebulization Q12H Eren Mandel MD   20 mcg at 04/14/25 0810    hydrALAZINE (APRESOLINE) tablet 75 mg  75 mg Oral or Feeding Tube Q8H Ekaterina Gayle MD   75 mg at 04/13/25 2338    insulin aspart (NovoLOG) injection (RAPID ACTING)  1-10 Units Subcutaneous TID AC Owen Andrade MD   5 Units at 04/14/25 0835    insulin aspart (NovoLOG) injection (RAPID ACTING)  1-7 Units Subcutaneous At Bedtime Owen Andrade  MD Candida   3 Units at 04/13/25 2219    insulin glargine (LANTUS PEN) injection 15 Units  15 Units Subcutaneous BID Owen Andrade MD   15 Units at 04/14/25 0820    ipratropium - albuterol 0.5 mg/2.5 mg/3 mL (DUONEB) neb solution 3 mL  3 mL Nebulization Q4H WA Owen Andrade MD   3 mL at 04/14/25 0810    midodrine (PROAMATINE) tablet 5 mg  5 mg Oral Once per day on Monday Wednesday Friday Ekaterina Gayle MD   5 mg at 04/09/25 1342    pantoprazole (PROTONIX) 2 mg/mL suspension 40 mg  40 [facility] Gayle [facility]   40 mg at 04/14/25 0818    polyethylene glycol (MIRALAX) Packet 17 g  17 g Oral or Feeding Tube BID Ekaterina Gayle MD   17 g at 04/13/25 2051    predniSONE (DELTASONE) tablet 20 mg  20 mg Oral Daily Debbie Zarco MD   20 mg at 04/13/25 1009    sennosides (SENOKOT) tablet 2 tablet  2 tablet Oral BID Blaise Holden MD   2 tablet at 04/13/25 1009    sertraline (ZOLOFT) tablet 75 mg  75 mg Oral or Feeding Tube Daily Ekaterina Gayle MD   75 mg at 04/13/25 1009    sevelamer carbonate (RENVELA) tablet 1,600 mg  1,600 mg Oral TID w/meals Ekatreina Gayle MD   1,600 mg at 04/13/25 1806    sodium chloride (PF) 0.9% PF flush 3 mL  3 mL Intravenous Q8H Ganesh Lawson MD   3 mL at 04/12/25 1821    sodium chloride (PF) 0.9% PF flush 3 mL  3 mL Intracatheter Q8H [facility] Ekaterina Gayle MD   3 mL at 04/14/25 0836    vancomycin (VANCOCIN) 500 mg vial to attach to  mL bag  500 mg Intravenous Once Ekaterina Gayle MD        vancomycin place cardoso - receiving intermittent dosing  1 each Intravenous See Admin Instructions Ekaterina Gayle MD        vitamin D3 (CHOLECALCIFEROL) tablet 50 mcg  50 mcg Oral or Feeding Tube Daily Ekaterina Gayle MD   50 mcg at 04/13/25 1009       Data   Recent Labs   Lab 04/14/25  0807 04/14/25  0736 04/14/25  0638 04/14/25  0150 04/13/25  0825 04/13/25  0540 04/12/25  1251 04/12/25  1002 04/11/25  0737 04/11/25  0517  04/10/25  1716 04/10/25  1616   WBC  --   --   --   --   --  5.8  --  5.8  --  6.9  --   --    HGB 8.6*  --   --   --   --  9.2*  --  9.3*  --  8.3*  --   --    MCV  --   --   --   --   --  90  --  91  --  92  --   --    PLT  --   --   --   --   --  255  --  239  --  216  --   --    NA  --   --  126*  --   --  130*  --  134*  --  134*  --   --    POTASSIUM  --   --  4.4  --   --  4.3  --  4.1  --  3.8  --   --    CHLORIDE  --   --  87*  --   --  90*  --  94*  --  93*  --   --    CO2  --   --  29  --   --  28  --  29  --  32*  --   --    BUN  --   --  82.4*  --   --  55.3*  --  35.9*  --  58.8*  --   --    CR  --   --  5.15*  --   --  3.93*  --  2.73*  --  3.90*  --   --    ANIONGAP  --   --  10  --   --  12  --  11  --  9  --   --    JODY  --   --  10.7*  --   --  11.0*  --  10.9*  --  10.4  --   --    GLC  --  264* 267* 243*   < > 270*   < > 164*   < > 170*   < >  --    PROTTOTAL  --   --   --   --   --   --   --   --   --   --   --  6.0*    < > = values in this interval not displayed.       No results found for this or any previous visit (from the past 24 hours).

## 2025-04-14 NOTE — PROGRESS NOTES
Assessment and Plan:     ESRD on dialysis: 3.5 h,  BFR, 3 L UF. K protocol, 35 HCO3 and 140 Na. On EPO, no heparin. Midodrine prior to dialysis.     VIt D, renvela. Now off calcitriol.     Run MWF.             Interval History:   Anemia:     Hypertension:     DM    Decubiti                     Review of Systems:   SOB last night. On NC O2. No sx on dialysis.           Medications:     Current Facility-Administered Medications   Medication Dose Route Frequency Provider Last Rate Last Admin    - MEDICATION INSTRUCTIONS for Dialysis Patients -   Does not apply See Admin Instructions Ekaterina Gayle MD        amiodarone (PACERONE) tablet 200 mg  200 mg Oral or Feeding Tube Daily Owen Andrade MD   200 mg at 04/14/25 0818    amLODIPine (NORVASC) tablet 10 mg  10 mg Oral or Feeding Tube Once per day on Sunday Tuesday Thursday Saturday Ekaterina Gayle MD   10 mg at 04/13/25 1009    apixaban ANTICOAGULANT (ELIQUIS) tablet 5 mg  5 mg Oral or Feeding Tube BID Ekaterina Gayle MD   5 mg at 04/14/25 0818    atorvastatin (LIPITOR) tablet 20 mg  20 mg Oral or Feeding Tube QPM Ekaterina Gayle MD   20 mg at 04/13/25 2051    budesonide (PULMICORT) neb solution 0.5 mg  0.5 mg Nebulization BID Eren Mandel MD   0.5 mg at 04/14/25 0810    cetirizine (zyrTEC) tablet 10 mg  10 mg Oral or Feeding Tube At Bedtime Ekaterina Gayle MD   10 mg at 04/13/25 2051    formoterol (PERFOROMIST) neb solution 20 mcg  20 mcg Nebulization Q12H Eren Mandel MD   20 mcg at 04/14/25 0810    heparin (porcine) injection 500 Units  500 Units Intravenous Q1H Dusyt Prieto MD   500 Units at 04/14/25 0929    hydrALAZINE (APRESOLINE) tablet 75 mg  75 mg Oral or Feeding Tube Q8H Ekaterina Gayle MD   75 mg at 04/13/25 2338    insulin aspart (NovoLOG) injection (RAPID ACTING)  1-10 Units Subcutaneous TID AC Owen Andrade MD   5 Units at 04/14/25 0835    insulin aspart (NovoLOG) injection (RAPID ACTING)  1-7  Units Subcutaneous At Bedtime Owen Andrade MD   3 Units at 04/13/25 2219    insulin glargine (LANTUS PEN) injection 15 Units  15 Units Subcutaneous BID Owen Andrade MD   15 Units at 04/14/25 0820    ipratropium - albuterol 0.5 mg/2.5 mg/3 mL (DUONEB) neb solution 3 mL  3 mL Nebulization Q4H WA Owen Andrade MD   3 mL at 04/14/25 0810    midodrine (PROAMATINE) tablet 5 mg  5 mg Oral Once per day on Monday Wednesday Friday Ekaterina Gayle MD   5 mg at 04/09/25 1342    pantoprazole (PROTONIX) 2 mg/mL suspension 40 mg  40 [facility]   40 mg at 04/14/25 0818    polyethylene glycol (MIRALAX) Packet 17 g  17 g Oral or Feeding Tube BID Ekaterina Gayle MD   17 g at 04/13/25 2051    predniSONE (DELTASONE) tablet 20 mg  20 mg Oral Daily Owen Andrade MD   20 mg at 04/13/25 1009    sennosides (SENOKOT) tablet 2 tablet  2 tablet Oral BID Blaise Holden MD   2 tablet at 04/13/25 1009    sertraline (ZOLOFT) tablet 75 mg  75 mg Oral or Feeding Tube Daily Ekaterina Gayle MD   75 mg at 04/13/25 1009    sevelamer carbonate (RENVELA) tablet 1,600 mg  1,600 mg Oral TID w/meals Ekaterina Gayle MD   1,600 mg at 04/13/25 1806    sodium chloride (PF) 0.9% PF flush 3 mL  3 mL Intravenous Q8H Ganesh Lawson MD   3 mL at 04/12/25 1821    sodium chloride (PF) 0.9% PF flush 3 mL  3 mL Intracatheter Q8H [facility] Ekaterina Gayle MD   3 mL at 04/14/25 0836    vancomycin (VANCOCIN) 500 mg vial to attach to  mL bag  500 mg Intravenous Once Ekaterina Gayle MD        vancomycin place cardoso - receiving intermittent dosing  1 each Intravenous See Admin Instructions Ekaterina Gayle MD        vitamin D3 (CHOLECALCIFEROL) tablet 50 mcg  50 mcg Oral or Feeding Tube Daily Ekaterina Gayle MD   50 mcg at 04/13/25 1009     Current Facility-Administered Medications   Medication Dose Route Frequency Provider Last Rate Last Admin    dextrose 10% infusion    Intravenous Continuous PRN Ekaterina Gayle MD        May take oral meds with a sip of water, the morning of SHAHNAZ procedure.   Does not apply Continuous PRN Ganesh Lawson MD        Stop Heparin 60 minutes before end of treatment   Does not apply Continuous PRN Alysia Roman MD   Given at 25 0936     Current active medications and PTA medications reviewed, see medication list for details.            Physical Exam:   Vitals were reviewed  Patient Vitals for the past 24 hrs:   BP Temp Temp src Pulse Resp SpO2 Weight   25 0915 (!) 214/65 -- -- 66 -- 99 % --   25 0900 (!) 206/70 -- -- 68 13 98 % --   25 0855 (!) 156/57 98.8  F (37.1  C) Oral 69 11 98 % --   25 0803 (!) 159/54 97.5  F (36.4  C) Axillary 69 12 100 % --   25 0458 -- -- -- -- -- 100 % 87.9 kg (193 lb 12.6 oz)   25 2259 (!) 155/51 98.7  F (37.1  C) Axillary 71 26 100 % --   25 (!) 149/48 98.1  F (36.7  C) Oral 75 18 97 % --   25 1454 (!) 148/45 98.1  F (36.7  C) Oral 69 18 98 % --   25 1410 -- -- -- -- -- 95 % --   25 1312 -- -- -- -- -- 97 % --   25 1047 -- -- -- -- -- 99 % --   25 1009 (!) 167/56 -- -- -- -- -- --       Temp:  [97.5  F (36.4  C)-98.8  F (37.1  C)] 98.8  F (37.1  C)  Pulse:  [66-75] 66  Resp:  [11-26] 13  BP: (148-214)/(45-70) 214/65  FiO2 (%):  [30 %] 30 %  SpO2:  [95 %-100 %] 99 %    Temperatures:  Current - Temp: 98.8  F (37.1  C); Max - Temp  Av.2  F (36.8  C)  Min: 97.5  F (36.4  C)  Max: 98.8  F (37.1  C)  Respiration range: Resp  Av.3  Min: 11  Max: 26  Pulse range: Pulse  Av.6  Min: 66  Max: 75  Blood pressure range: Systolic (24hrs), Av , Min:148 , Max:214   ; Diastolic (24hrs), Av, Min:45, Max:70    Pulse oximetry range: SpO2  Av.3 %  Min: 95 %  Max: 100 %    I/O last 3 completed shifts:  In:  [P.O.:410; I.V.:3; NG/GT:200]  Out: -       Intake/Output Summary (Last 24 hours) at 2025 0951  Last data  filed at 4/14/2025 0700  Gross per 24 hour   Intake 2174 ml   Output --   Net 2174 ml       Alert and responsive  LAF with needles in place  L AKA, RLE no edema, warm       Wt Readings from Last 4 Encounters:   04/14/25 87.9 kg (193 lb 12.6 oz)   03/25/25 92.5 kg (204 lb)   03/17/25 92.7 kg (204 lb 5.9 oz)   03/06/25 101.8 kg (224 lb 6.9 oz)          Data:          Lab Results   Component Value Date     04/14/2025     04/13/2025     04/12/2025     04/17/2021     04/09/2021     11/18/2020    Lab Results   Component Value Date    CHLORIDE 87 04/14/2025    CHLORIDE 90 04/13/2025    CHLORIDE 94 04/12/2025    CHLORIDE 106 11/24/2021    CHLORIDE 109 11/23/2021    CHLORIDE 110 11/22/2021    CHLORIDE 105 04/17/2021    CHLORIDE 101 04/09/2021    CHLORIDE 106 11/18/2020    Lab Results   Component Value Date    BUN 82.4 04/14/2025    BUN 55.3 04/13/2025    BUN 35.9 04/12/2025    BUN 37 11/24/2021    BUN 69 11/23/2021    BUN 64 11/22/2021    BUN 68 04/17/2021    BUN 53 04/09/2021    BUN 44 11/18/2020      Lab Results   Component Value Date    POTASSIUM 4.4 04/14/2025    POTASSIUM 4.3 04/13/2025    POTASSIUM 4.1 04/12/2025    POTASSIUM 4.7 02/02/2022    POTASSIUM 3.8 11/24/2021    POTASSIUM 6.6 11/23/2021    POTASSIUM 4.2 04/17/2021    POTASSIUM 3.8 04/16/2021    POTASSIUM 3.9 04/09/2021    Lab Results   Component Value Date    CO2 29 04/14/2025    CO2 28 04/13/2025    CO2 29 04/12/2025    CO2 31 11/24/2021    CO2 23 11/23/2021    CO2 24 11/22/2021    CO2 23 04/17/2021    CO2 22 04/09/2021    CO2 29 11/18/2020    Lab Results   Component Value Date    CR 5.15 04/14/2025    CR 3.93 04/13/2025    CR 2.73 04/12/2025    CR 5.98 04/17/2021    CR 4.35 04/16/2021    CR 5.28 04/09/2021        Recent Labs   Lab Test 04/14/25  0807 04/13/25  0540 04/12/25  1002 04/11/25  0517   WBC  --  5.8 5.8 6.9   HGB 8.6* 9.2* 9.3* 8.3*   HCT  --  28.5* 29.2* 26.7*   MCV  --  90 91 92   PLT  --  255 239 216      Recent Labs   Lab Test 03/29/25  1139 03/28/25  0320 03/27/25  1135   AST 12 11 16   ALT 14 16 22   ALKPHOS 82 93 92   BILITOTAL 0.3 0.4 0.4       Recent Labs   Lab Test 04/14/25  0638 04/07/25  0641 03/31/25  0731   MAG 2.4* 2.4* 2.3     Recent Labs   Lab Test 04/14/25  0638 04/07/25  0641 03/31/25  0731   PHOS 2.9 4.9* 3.7     Recent Labs   Lab Test 04/14/25  0638 04/13/25  0540 04/12/25  1002   JODY 10.7* 11.0* 10.9*       Lab Results   Component Value Date    JODY 10.7 (H) 04/14/2025     Lab Results   Component Value Date    WBC 5.8 04/13/2025    HGB 8.6 (L) 04/14/2025    HCT 28.5 (L) 04/13/2025    MCV 90 04/13/2025     04/13/2025     Lab Results   Component Value Date     (L) 04/14/2025    POTASSIUM 4.4 04/14/2025    CHLORIDE 87 (L) 04/14/2025    CO2 29 04/14/2025     (H) 04/14/2025     Lab Results   Component Value Date    BUN 82.4 (H) 04/14/2025    CR 5.15 (H) 04/14/2025     Lab Results   Component Value Date    MAG 2.4 (H) 04/14/2025     Lab Results   Component Value Date    PHOS 2.9 04/14/2025       Creatinine   Date Value Ref Range Status   04/14/2025 5.15 (H) 0.51 - 0.95 mg/dL Final   04/13/2025 3.93 (H) 0.51 - 0.95 mg/dL Final   04/12/2025 2.73 (H) 0.51 - 0.95 mg/dL Final   04/11/2025 3.90 (H) 0.51 - 0.95 mg/dL Final   04/10/2025 2.52 (H) 0.51 - 0.95 mg/dL Final   04/07/2025 4.92 (H) 0.51 - 0.95 mg/dL Final   04/17/2021 5.98 (H) 0.52 - 1.04 mg/dL Final   04/16/2021 4.35 (H) 0.52 - 1.04 mg/dL Final   04/09/2021 5.28 (H) 0.52 - 1.04 mg/dL Final   11/18/2020 4.23 (H) 0.52 - 1.04 mg/dL Final   11/16/2020 5.08 (H) 0.52 - 1.04 mg/dL Final   09/25/2020 6.87 (H) 0.52 - 1.04 mg/dL Final       Attestation:  I have reviewed today's vital signs, notes, medications, labs and imaging.  Seen on dialysis.      Braden Oneill MD

## 2025-04-15 ENCOUNTER — MEDICAL CORRESPONDENCE (OUTPATIENT)
Dept: HEALTH INFORMATION MANAGEMENT | Facility: CLINIC | Age: 75
End: 2025-04-15

## 2025-04-15 LAB
ANION GAP SERPL CALCULATED.3IONS-SCNC: 13 MMOL/L (ref 7–15)
BACTERIA PLR CULT: NO GROWTH
BUN SERPL-MCNC: 58.5 MG/DL (ref 8–23)
CALCIUM SERPL-MCNC: 11.1 MG/DL (ref 8.8–10.4)
CHLORIDE SERPL-SCNC: 91 MMOL/L (ref 98–107)
CREAT SERPL-MCNC: 3.64 MG/DL (ref 0.51–0.95)
EGFRCR SERPLBLD CKD-EPI 2021: 12 ML/MIN/1.73M2
GLUCOSE BLDC GLUCOMTR-MCNC: 156 MG/DL (ref 70–99)
GLUCOSE BLDC GLUCOMTR-MCNC: 225 MG/DL (ref 70–99)
GLUCOSE BLDC GLUCOMTR-MCNC: 299 MG/DL (ref 70–99)
GLUCOSE BLDC GLUCOMTR-MCNC: 307 MG/DL (ref 70–99)
GLUCOSE BLDC GLUCOMTR-MCNC: 389 MG/DL (ref 70–99)
GLUCOSE BLDC GLUCOMTR-MCNC: 92 MG/DL (ref 70–99)
GLUCOSE SERPL-MCNC: 191 MG/DL (ref 70–99)
GRAM STAIN RESULT: NORMAL
GRAM STAIN RESULT: NORMAL
HCO3 SERPL-SCNC: 27 MMOL/L (ref 22–29)
HGB BLD-MCNC: 9.8 G/DL (ref 11.7–15.7)
POTASSIUM SERPL-SCNC: 4.6 MMOL/L (ref 3.4–5.3)
SODIUM SERPL-SCNC: 131 MMOL/L (ref 135–145)

## 2025-04-15 PROCEDURE — 94640 AIRWAY INHALATION TREATMENT: CPT | Mod: 76

## 2025-04-15 PROCEDURE — 99232 SBSQ HOSP IP/OBS MODERATE 35: CPT | Performed by: PHYSICIAN ASSISTANT

## 2025-04-15 PROCEDURE — 999N000157 HC STATISTIC RCP TIME EA 10 MIN

## 2025-04-15 PROCEDURE — 99233 SBSQ HOSP IP/OBS HIGH 50: CPT | Performed by: STUDENT IN AN ORGANIZED HEALTH CARE EDUCATION/TRAINING PROGRAM

## 2025-04-15 PROCEDURE — 250N000009 HC RX 250: Performed by: INTERNAL MEDICINE

## 2025-04-15 PROCEDURE — 36415 COLL VENOUS BLD VENIPUNCTURE: CPT | Performed by: STUDENT IN AN ORGANIZED HEALTH CARE EDUCATION/TRAINING PROGRAM

## 2025-04-15 PROCEDURE — 120N000001 HC R&B MED SURG/OB

## 2025-04-15 PROCEDURE — 250N000013 HC RX MED GY IP 250 OP 250 PS 637: Performed by: STUDENT IN AN ORGANIZED HEALTH CARE EDUCATION/TRAINING PROGRAM

## 2025-04-15 PROCEDURE — 250N000013 HC RX MED GY IP 250 OP 250 PS 637: Performed by: HOSPITALIST

## 2025-04-15 PROCEDURE — 80048 BASIC METABOLIC PNL TOTAL CA: CPT | Performed by: STUDENT IN AN ORGANIZED HEALTH CARE EDUCATION/TRAINING PROGRAM

## 2025-04-15 PROCEDURE — 94640 AIRWAY INHALATION TREATMENT: CPT

## 2025-04-15 PROCEDURE — 85018 HEMOGLOBIN: CPT | Performed by: STUDENT IN AN ORGANIZED HEALTH CARE EDUCATION/TRAINING PROGRAM

## 2025-04-15 PROCEDURE — 250N000012 HC RX MED GY IP 250 OP 636 PS 637: Performed by: STUDENT IN AN ORGANIZED HEALTH CARE EDUCATION/TRAINING PROGRAM

## 2025-04-15 PROCEDURE — 250N000013 HC RX MED GY IP 250 OP 250 PS 637: Performed by: INTERNAL MEDICINE

## 2025-04-15 PROCEDURE — 94660 CPAP INITIATION&MGMT: CPT

## 2025-04-15 RX ADMIN — FORMOTEROL FUMARATE 20 MCG: 20 SOLUTION RESPIRATORY (INHALATION) at 19:40

## 2025-04-15 RX ADMIN — FORMOTEROL FUMARATE 20 MCG: 20 SOLUTION RESPIRATORY (INHALATION) at 06:53

## 2025-04-15 RX ADMIN — SEVELAMER CARBONATE 1600 MG: 800 TABLET, FILM COATED ORAL at 08:53

## 2025-04-15 RX ADMIN — BUDESONIDE 0.5 MG: 0.5 INHALANT RESPIRATORY (INHALATION) at 06:53

## 2025-04-15 RX ADMIN — IPRATROPIUM BROMIDE AND ALBUTEROL SULFATE 3 ML: .5; 3 SOLUTION RESPIRATORY (INHALATION) at 19:40

## 2025-04-15 RX ADMIN — APIXABAN 5 MG: 5 TABLET, FILM COATED ORAL at 20:22

## 2025-04-15 RX ADMIN — IPRATROPIUM BROMIDE AND ALBUTEROL SULFATE 3 ML: .5; 3 SOLUTION RESPIRATORY (INHALATION) at 06:53

## 2025-04-15 RX ADMIN — SERTRALINE HYDROCHLORIDE 75 MG: 50 TABLET ORAL at 08:53

## 2025-04-15 RX ADMIN — AMIODARONE HYDROCHLORIDE 200 MG: 200 TABLET ORAL at 08:53

## 2025-04-15 RX ADMIN — HYDRALAZINE HYDROCHLORIDE 75 MG: 25 TABLET ORAL at 15:42

## 2025-04-15 RX ADMIN — Medication 40 MG: at 08:51

## 2025-04-15 RX ADMIN — HYDRALAZINE HYDROCHLORIDE 75 MG: 25 TABLET ORAL at 08:52

## 2025-04-15 RX ADMIN — BUDESONIDE 0.5 MG: 0.5 INHALANT RESPIRATORY (INHALATION) at 19:40

## 2025-04-15 RX ADMIN — Medication 50 MCG: at 08:53

## 2025-04-15 RX ADMIN — AMLODIPINE BESYLATE 10 MG: 10 TABLET ORAL at 08:53

## 2025-04-15 RX ADMIN — SEVELAMER CARBONATE 1600 MG: 800 TABLET, FILM COATED ORAL at 17:51

## 2025-04-15 RX ADMIN — SENNOSIDES 2 TABLET: 8.6 TABLET ORAL at 08:53

## 2025-04-15 RX ADMIN — APIXABAN 5 MG: 5 TABLET, FILM COATED ORAL at 08:53

## 2025-04-15 RX ADMIN — CETIRIZINE HYDROCHLORIDE 10 MG: 10 TABLET, FILM COATED ORAL at 20:22

## 2025-04-15 RX ADMIN — PREDNISONE 20 MG: 20 TABLET ORAL at 08:53

## 2025-04-15 RX ADMIN — ATORVASTATIN CALCIUM 20 MG: 20 TABLET, FILM COATED ORAL at 20:22

## 2025-04-15 RX ADMIN — IPRATROPIUM BROMIDE AND ALBUTEROL SULFATE 3 ML: .5; 3 SOLUTION RESPIRATORY (INHALATION) at 15:00

## 2025-04-15 ASSESSMENT — ACTIVITIES OF DAILY LIVING (ADL)
ADLS_ACUITY_SCORE: 89
ADLS_ACUITY_SCORE: 93
ADLS_ACUITY_SCORE: 89
ADLS_ACUITY_SCORE: 93
ADLS_ACUITY_SCORE: 89
ADLS_ACUITY_SCORE: 89
ADLS_ACUITY_SCORE: 93
ADLS_ACUITY_SCORE: 89
ADLS_ACUITY_SCORE: 89
ADLS_ACUITY_SCORE: 93
ADLS_ACUITY_SCORE: 89
ADLS_ACUITY_SCORE: 92
ADLS_ACUITY_SCORE: 93
ADLS_ACUITY_SCORE: 89
ADLS_ACUITY_SCORE: 89
ADLS_ACUITY_SCORE: 93
ADLS_ACUITY_SCORE: 93
ADLS_ACUITY_SCORE: 89

## 2025-04-15 NOTE — PROGRESS NOTES
[facility]    Infectious Disease Progress Note    Date of Service (when I saw the patient): 04/15/2025     Assessment & Plan   Supriya Herr is a 75 year old female who was admitted on 3/27/2025.     Impression:  76 yo female with a history of ESRD on HD, CHF, DM, HTN, JONE, traumatic left AKA, and obesity who presented with abdominal pain and dry heaves and has since been found to have E. Faecalis bacteremia and urine culture also growing E. Faecalis.      -Afebrile.   -Leukocytosis.   -Blood cultures from 3/27 and 3/28 with E. Faecalis. Blood cultures from 3/29 and on are no growth.   -Urine culture with 50-100K E. Faecalis. Produces very little urine with ESRD. No urinary symptoms. Urine may either be source initially or spillover, imaging without obvious abscess, obstruction etc.  -CT C/A/P with mild bladder wall thickening, rectal stool ball, no abscess.   -SHAHNAZ with no evidence of a mass or vegetation -this was a good quality SHAHNAZ   -Parainfluenza diagnosed 4/12/25, improving.   -On Vancomycin  -Allergy listed to Unasyn and Penicillin (rash).        Recommendations:   Will plan on treating with vancomycin with dialysis for 6 weeks (last day of therapy to be 5/10/25).   Nephrology to place orders for Vancomycin to be given at dialysis upon discharge.   Will need blood cultures repeated one week after completing IV antibiotics.   Will arrange outpatient follow-up with ID in 2-3 weeks once patient discharges.   Symptomatic cares for parainfluenza infection.   ID will sign off. Please call us back with questions.     Patient and plan discussed with Dr. Cohen.     Arcelia Velazquez PA-C    Interval History   Tolerating antibiotics ok   No new rashes or issues with antibiotics   Breathing is much better. Some cough yet. Feeling better overall and wanting to go home with her daughter.   Labs reviewed   No changes to past medical, social or family history         Physical Exam   Temp: 98.4  F  (36.9  C) Temp src: Oral BP: (!) 170/60 Pulse: 67   Resp: 23 SpO2: 96 % O2 Device: None (Room air)    Vitals:    04/13/25 0550 04/14/25 0458 04/14/25 1558   Weight: 86 kg (189 lb 9.5 oz) 87.9 kg (193 lb 12.6 oz) 82.7 kg (182 lb 5.1 oz)     Vital Signs with Ranges  Temp:  [97.9  F (36.6  C)-99.4  F (37.4  C)] 98.4  F (36.9  C)  Pulse:  [67-79] 67  Resp:  [17-23] 23  BP: (126-170)/() 170/60  FiO2 (%):  [30 %] 30 %  SpO2:  [92 %-98 %] 96 %    Constitutional: Awake, alert, cooperative, no apparent distress  Lungs: Clear to auscultation bilaterally, no crackles or wheezing.   Cardiovascular: Regular rate and rhythm, normal S1 and S2, and no murmur noted  Abdomen: Normal bowel sounds, soft, non-distended, non-tender  Skin: No rashes, no cyanosis, no edema  Other:    Medications   Current Facility-Administered Medications   Medication Dose Route Frequency Provider Last Rate Last Admin    dextrose 10% infusion   Intravenous Continuous PRN Ekaterina Gayle MD        May take oral meds with a sip of water, the morning of SHAHNAZ procedure.   Does not apply Continuous PRN Ganesh Lawson MD         Current Facility-Administered Medications   Medication Dose Route Frequency Provider Last Rate Last Admin    - MEDICATION INSTRUCTIONS for Dialysis Patients -   Does not apply See Admin Instructions Ekaterina Gayle MD        amiodarone (PACERONE) tablet 200 mg  200 mg Oral or Feeding Tube Daily Owen Andrade MD   200 mg at 04/15/25 0853    amLODIPine (NORVASC) tablet 10 mg  10 mg Oral or Feeding Tube Once per day on Sunday Tuesday Thursday Saturday Ekaterina Gayle MD   10 mg at 04/15/25 0853    apixaban ANTICOAGULANT (ELIQUIS) tablet 5 mg  5 mg Oral or Feeding Tube BID Ekaterina Gayle MD   5 mg at 04/15/25 0853    atorvastatin (LIPITOR) tablet 20 mg  20 mg Oral or Feeding Tube QPM Ekaterina Gayle MD   20 mg at 04/14/25 2059    budesonide (PULMICORT) neb solution 0.5 mg  0.5 mg Nebulization BID Ministerio,  MD Eren   0.5 mg at 04/15/25 0653    cetirizine (zyrTEC) tablet 10 mg  10 mg Oral or Feeding Tube At Bedtime Ekaterina Gayle MD   10 mg at 04/14/25 2059    formoterol (PERFOROMIST) neb solution 20 mcg  20 mcg Nebulization Q12H Eren Mandel MD   20 mcg at 04/15/25 0653    hydrALAZINE (APRESOLINE) tablet 75 mg  75 mg Oral or Feeding Tube Q8H Ekaterina Gayle MD   75 mg at 04/15/25 0852    insulin aspart (NovoLOG) injection (RAPID ACTING)  1-10 Units Subcutaneous TID  Owen Andrade MD   7 Units at 04/15/25 0853    insulin aspart (NovoLOG) injection (RAPID ACTING)  1-7 Units Subcutaneous At Bedtime Owen Andrade MD   6 Units at 04/14/25 2116    insulin glargine (LANTUS PEN) injection 20 Units  20 Units Subcutaneous BID Debbie Zarco MD   20 Units at 04/15/25 0854    ipratropium - albuterol 0.5 mg/2.5 mg/3 mL (DUONEB) neb solution 3 mL  3 mL Nebulization Q4H WA Owen Andrade MD   3 mL at 04/15/25 0653    midodrine (PROAMATINE) tablet 5 mg  5 mg Oral Once per day on Monday Wednesday Friday Ekaterina Gayle MD   5 mg at 04/09/25 1342    pantoprazole (PROTONIX) 2 mg/mL suspension 40 mg  40 mg Per [facility] Ekaterina aGyle MD   40 mg at 04/15/25 0851    polyethylene glycol (MIRALAX) Packet 17 g  17 g Oral or Feeding Tube BID Ekaterina Gayle MD   17 g at 04/13/25 2051    predniSONE (DELTASONE) tablet 20 mg  20 mg Oral Daily Debbie Zarco MD   20 mg at 04/15/25 0853    sennosides (SENOKOT) tablet 2 tablet  2 tablet Oral BID Blaise Holden MD   2 tablet at 04/15/25 0853    sertraline (ZOLOFT) tablet 75 mg  75 mg Oral or Feeding Tube Daily Ekaterina Gayle MD   75 mg at 04/15/25 0853    sevelamer carbonate (RENVELA) tablet 1,600 mg  1,600 mg Oral TID w/meals Ekaterina Gayle MD   1,600 mg at 04/15/25 0853    sodium chloride (PF) 0.9% PF flush 3 mL  3 mL Intravenous Q8H Ganesh Lawson MD   3 mL at 04/15/25 0139    vancomycin place cardoso -  receiving intermittent dosing  1 each Intravenous See Admin Instructions Ekaterina Gayle MD        vitamin D3 (CHOLECALCIFEROL) tablet 50 mcg  50 mcg Oral or Feeding Tube Daily Ekaterina Gayle MD   50 mcg at 04/15/25 0853       Data   All microbiology laboratory data reviewed.  Recent Labs   Lab Test 04/14/25  0807 04/13/25  0540 04/12/25  1002 04/11/25  0517   WBC  --  5.8 5.8 6.9   HGB 8.6* 9.2* 9.3* 8.3*   HCT  --  28.5* 29.2* 26.7*   MCV  --  90 91 92   PLT  --  255 239 216     Recent Labs   Lab Test 04/14/25  0638 04/13/25  0540 04/12/25  1002   CR 5.15* 3.93* 2.73*         04/12/2025 1245 04/12/2025 1807 Respiratory Panel PCR [[id number]]    (Abnormal)   Swab from Nasopharyngeal    Final result Component Value   Adenovirus Not Detected   Coronavirus Not Detected   This test detects Coronavirus 229E, HKU1, NL63 and OC43 but does not distinguish between them. It does not detect MERS ( Respiratory Syndrome), SARS (Severe Acute Respiratory Syndrome) or 2019-nCoV (Novel 2019) Coronavirus.   Human Metapneumovirus Not Detected   Human Rhin/Enterovirus Not Detected   Influenza A Not Detected   Influenza A, H1 Not Detected   Influenza A 2009 H1N1 Not Detected   Influenza A, H3 Not Detected   Influenza B Not Detected   Parainfluenza Virus 1 Not Detected   Parainfluenza Virus 2 Not Detected   Parainfluenza Virus 3 Detected Abnormal    Parainfluenza Virus 4 Not Detected   Respiratory Syncytial Virus A Not Detected   Respiratory Syncytial Virus B Not Detected   Chlamydia Pneumoniae Not Detected   Mycoplasma Pneumoniae Not Detected          04/10/2025 1521 04/10/2025 1642 Protein fluid [[id number]]    Pleural fluid from Pleural Cavity, Left    Final result Component Value Units   Protein Fluid Source Pleural Cavity, Left    Protein Total Fluid 2.3 g/dL          04/10/2025 1521 04/10/2025 1719 Cell count with differential fluid [[id number]]    (Abnormal)   Pleural fluid from Lung, Left    Final  result Component Value   No component results          04/10/2025 1521 04/15/2025 0712 Pleural fluid Aerobic Bacterial Culture Routine With Gram Stain [[id number]]   Pleural fluid from Lung, Left    Final result Component Value   Culture No Growth   Gram Stain Result No organisms seen    1+ WBC seen             04/10/2025 1521 04/10/2025 1719 Cell Count Body Fluid [[id number]]    (Abnormal)   Pleural fluid from Lung, Left    Final result Component Value Units   Color Red Abnormal     Clarity Bloody Abnormal     Cell Count Fluid Source Pleural Cavity, Left    Total Nucleated Cells 475 /uL          04/10/2025 1521 04/10/2025 1719 Differential Body Fluid [[id number]]    Pleural fluid from Lung, Left    Final result Component Value Units   % Neutrophils 3 %   % Lymphocytes 49 %   % Monocyte/Macrophages 33 %   % Basophils 1 %   % Lining Cells 14 %               03/30/2025 0616 04/04/2025 1046 Blood Culture Arm, Right [[id number]]    Blood from Arm, Right    Final result Component Value   Culture No Growth             03/30/2025 0616 04/04/2025 1046 Blood Culture Hand, Right [[id number]]    Blood from Hand, Right    Final result Component Value   Culture No Growth             03/29/2025 1532 04/03/2025 2103 Blood Culture Wrist, Right [[id number]]    Blood from Wrist, Right    Final result Component Value   Culture No Growth             03/29/2025 1139 04/03/2025 1605 Blood Culture Arm, Right [[id number]]   Blood from Arm, Right    Final result Component Value   Culture No Growth             03/28/2025 1249 03/31/2025 0731 Blood Culture Peripheral Blood [[id number]]   (Abnormal)   Peripheral Blood    Final result Component Value   Culture Positive on the 1st day of incubation Abnormal     Enterococcus faecalis Panic     2 of 2 bottles  Susceptibilities done on previous cultures             03/28/2025 1249 03/31/2025 0732 Blood Culture Peripheral Blood [[id number]]   (Abnormal)   Peripheral Blood    Final  result Component Value   Culture Positive on the 1st day of incubation Abnormal     Enterococcus faecalis Panic     1 of 2 bottles  Susceptibilities done on previous cultures             03/28/2025 0611 03/28/2025 1224 MRSA MSSA PCR, Nasal Swab [[id number]]    Swab from Nose    Final result Component Value   MRSA Target DNA Negative   SA Target DNA Negative          03/27/2025 2025 03/30/2025 0716 Blood Culture Hand, Right [[id number]]   (Abnormal)   Blood from Hand, Right    Final result Component Value   Culture Positive on the 1st day of incubation Abnormal     Enterococcus faecalis Panic     2 of 2 bottles  Susceptibilities done on previous cultures             03/27/2025 1646 03/27/2025 1744 Influenza A/B, RSV and SARS-CoV2 PCR (COVID-19) Nose [[id number]]    Swab from Nose    Final result Component Value   Influenza A PCR Negative   Influenza B PCR Negative   RSV PCR Negative   SARS CoV2 PCR Negative   NEGATIVE: SARS-CoV-2 (COVID-19) RNA not detected, presumed negative.          03/27/2025 1635 03/30/2025 0722 Blood Culture Hand, Right [[id number]]    (Abnormal)   Blood from Hand, Right    Final result Component Value   Culture Positive on the 1st day of incubation Abnormal     Enterococcus faecalis Panic     2 of 2 bottles       Susceptibility     Enterococcus faecalis     CARLOS     Ampicillin <=2 ug/mL Susceptible     Gentamicin Synergy Susceptible... Susceptible 1     Vancomycin 1 ug/mL Susceptible              1 No high level gentamicin resistance found - therefore combination therapy with an aminoglycoside may be indicated for serious enterococcal infections such as bacteremia and endocarditis.             03/27/2025 1635 03/28/2025 0727 Verigene GP Panel [[id number]]    (Abnormal)   Blood from Hand, Right    Final result Component Value   Staphylococcus species Not Detected   Staphylococcus aureus Not Detected   Staphylococcus epidermidis Not Detected   Staphylococcus lugdunensis Not Detected    Enterococcus faecalis Detected Abnormal    Positive for Enterococcus faecalis and negative for Mary/vanB genes (not resistant to vancomycin) by [place]erm[place] multiplex nucleic acid test. Final identification and antimicrobial susceptibility testing will be verified by standard methods.   Enterococcus faecium Not Detected   Streptococcus species Not Detected   Streptococcus agalactiae Not Detected   Streptococcus anginosus group Not Detected   Streptococcus pneumoniae Not Detected   Streptococcus pyogenes Not Detected   Listeria species Not Detected          03/27/2025 1443 03/29/2025 1401 Urine Culture [[id number]]    (Abnormal)   Urine, Catheter    Final result Component Value   Culture 50,000-100,000 CFU/mL Enterococcus faecalis Abnormal        Susceptibility     Enterococcus faecalis     CARLOS     Ampicillin <=2 ug/mL Susceptible     Nitrofurantoin <=16 ug/mL Susceptible     Vancomycin 1 ug/mL Susceptible

## 2025-04-15 NOTE — PROGRESS NOTES
Care Management Follow Up    Length of Stay (days): 18    Expected Discharge Date: 04/16/2025     Concerns to be Addressed:       Patient plan of care discussed at interdisciplinary rounds: Yes    Anticipated Discharge Disposition: Home, Home Care, Transitional Care              Anticipated Discharge Services: Transportation Services, Home Care  Anticipated Discharge DME:      Patient/family educated on Medicare website which has current facility and service quality ratings: no  Education Provided on the Discharge Plan:    Patient/Family in Agreement with the Plan: yes    Referrals Placed by CM/SW:    Private pay costs discussed: Not applicable    Discussed  Partnership in Safe Discharge Planning  document with patient/family: No     Handoff Completed: No, handoff not indicated or clinically appropriate    Additional Information:  Spoke with Francisca at [facility] regarding patients discharge. She requested RX and ICD 10 code for the Semi electric bed and BP monitor. Francisca stated pill  will not be covered by her insurance.   RX with MD signature  emailed to [email or web address]  Writer spoke with patients daughter regarding pill . Discussed with daughter that writer had spoken with patients CHAZ Guerrero [phone]  who will be able to provide DME that's needed for patient at home under her Waiver services.  Next Steps:  CC to notify CHAZ Hughes day before discharge to start Waiver process.  Manuel Richey RN -[phone]

## 2025-04-15 NOTE — PROVIDER NOTIFICATION
MD Notification    Notified Person: MD    Notified Person Name: Roshan Arriaga MD    Notification Date/Time: 4/15/25, 0149    Notification Interaction: [employer] messaging    Purpose of Notification: Blood glucose at bedtime was 344. Lantus and bedtime insulin was given. Blood glucose is now 389. Please advise. Thank you      Orders Received: Insulin aspart 7 units once was ordered    Comments:

## 2025-04-15 NOTE — PLAN OF CARE
Goal Outcome Evaluation:       Summary: Parainfluenza, respiratory failure-Dialysis (MWF), abdominal pain, Sepsis, CHF     DATE & TIME: 4/15/25 8476-9676    Cognitive Concerns/ Orientation: A&Ox3   BEHAVIOR & AGGRESSION TOOL COLOR: Green   CIWA SCORE: NA   ABNL VS/O2: VSS, RA  MOBILITY: Total lift, left AKA- turn and repositioning   PAIN MANAGMENT: Denied  DIET: Renal, TF 80 ml/ hr (6 pm-6 am), takes pills whole with water   BOWEL/BLADDER: Large incontinent soft BM x1, small stool x1  ABNL LAB/BG: , 92, 156, 225  DRAIN/DEVICES: PIV SL, left fistula  TELEMETRY RHYTHM: NA  SKIN: Blanchable redness on coccyx with excoriation, mepilex in place, turned and repositioned   TESTS/PROCEDURES: None- dialysis tomorrow  D/C DAY/GOALS/PLACE: Pending improvement,  to home with daugher once equipment all coordinated   OTHER IMPORTANT INFO: Lungs coarse- expiratory wheezes, on scheduled Nebs, productive cough.  Pulmonary consult pending.  ID signed off- will need 6 weeks of Vancomycin with Dialysis per ID.

## 2025-04-15 NOTE — PROGRESS NOTES
"[facility]  Hospitalist Progress Note    Assessment & Plan   Supriya Herr is a 75 year old female with very complex past medical history including end-stage renal disease on hemodialysis, HFpEF, paroxysmal atrial fibrillation, hypertension, hyperlipidemia, anxiety, depression, chronic anemia, dysphagia status post PEG tube, type 2 diabetes, left traumatic AKA and recent hospitalization with multifactorial respiratory failure who was admitted on 3/27/2025 with abdominal pain and retching.      Pf note, pt with multiple recent hospitalizations.      \"Hospitalized at [facility] from 3/2 - 3/6/2025 with shortness of breath likely multifactorial.  Volume status was difficult to determine, underwent right heart cath on 3/5 showed mildly elevated right atrial and PCWP readings.  Was treated for acute respiratory failure likely from heart failure exacerbation, atrial fibrillation, end-stage renal disease and discharged to care facility with supplemental nocturnal oxygen.\"     \"Prolonged hospitalized at [facility] from 1/8 to 2/26/2025 for acute hypoxic respiratory failure multifactorial, was intubated ( 1/9-1/18), reintubated (1/20 - 1/25).  Then was treated for shock with pressors, influenza A pneumonia, hospital-acquired pneumonia with intravenous antibiotics, steroids, antivirals.  Then also noted to have heart failure exacerbation, recurrent A-fib with RVR, subsequent bradycardia.  Then also noted to have encephalopathy likely from infectious, metabolic etiology and delirium.  During that hospitalization was noted to have dysphagia, pharyngeal edema, was evaluated by ENT on 2/16.  No findings to explain dysphagia.  G-tube was placed by IR on 2/17 and was on nocturnal tube feeds.  Discharged to TCU for rehabilitation.\"     Dispo: Pt's daughter prefers her to return home rather than TCU. Pt will need a number of home medical supplies prior to being able to go home. These have been ordered in the discharge " navigator. It may take some time for them to be delivered to her home. Appreciate social work and care coordinator help in arranging this. Pt's daughter will also need teaching on how to do tube feedings. IV antibiotics should be arranged through nephrology so she can get them with dialysis.      Acute Respiratory Failure likely 2/2 Parainfluenza Bronchitis    Near RML Collapse   3mm Left Upper Pulmonary Nodule   From 4/6-4/8/25 had been fairly optimized in general, however despite objectively having good O2 sats and CXR 4/6 unremarkable, feels SOB at times and has wanted mask or NC in place. Blood gas repeats compensated. CT chest on 4/9/25 showed mild to moderate diffuse bronchial wall thickening, with associated mucus plugging in peripheral subsegmental branches, near complete right middle lobe collapse, new from prior and without obstructing endobronchial lesion identified. On 4/12/25 she was diagnosed with parainfluenza. She was started on prednisone 20 mg daily for bronchial congestion      - Spo2 > 90%                - Wean as able    - Weaned to room air on 4/14/25     - 4/10/25 US guided left thoracentesis produced 150 ml fluid     - Continue Duonebs Q4 hours while awake     - Continue prednisone 20mg daily for bronchial congestion on 4/11               - Plan for 5 day course     - Pulmonary consulted   - Spoke to Dr. Dalal who plans to see her today      Sepsis 2/2 E. Faecalis Bacteremia   Elevated Procalcitonin   Elevated CRP  On admission, WBC elevated to 17.4, Procal elevated to 1.3, and CRP elevated to 39.78.   Unclear infectious source. She has some mild non-specific urinary bladder wall thickening, but with ESRD makes very little urine. Also has a new MIKAYLA 3mm nodule which may be infectious or inflammatory, but no respiratory complaints. COVID, flu RSV negative at admission. UA very abnormal but difficult to interpret in the setting of ESRD. Ultimately Ucx positive for E faecalis. Could be initial  source or seeding from bacteremia.     - 3/27/25 Blood Cx: 4/4 bottles positive for Enterococcus faecalis   - 3/28/25 Blood Cx: 3/4 bottles positive for Enterococcus faecalis   - 3/29/25-3/30/25 Blood Cx: No growth      - 3/29/25 TTE without vegetation seen   - 4/1/25 SHAHNAZ without concern for mass or vegetation     - ID consulted and signed off 4/15/25               - Continue IV vancomycin                            - Plan to complete 6 week course with IV vancomycin - last dose 5/10/25               - Nephrology to place orders for Vancomycin to be given at dialysis upon discharge.                - Will need blood cultures repeated one week after completing IV antibiotics                - Will arrange outpatient follow-up with ID in 2-3 weeks once patient discharges      Abdominal Pain: Improved/Resolved  Retching, with Hx of GERD   Dysphagia s/p PEG Tube (2/1/2025)   Constipation, Rectal Stool Ball   Pt presented to the ED with vague complaints of retching/dry heaving without vomiting and some crampy lower abdominal pain. CT scan showed large rectal stool ball without evidence of obstruction. There was also some non-specific mild urinary bladder wall thickening. No other intra-abdominal pathology.   - Unclear exactly what is going on. Perhaps she has some reflux of tube feeds or other esophageal issue.  - GI consulted                - EGD on 3/30 without etiology to explain dysphagia               - XR Esophagram normal  - Continue Protonix 40 mg daily  - Continue bowel regimen to work on the constipation   - Zofran available PRN  - Will continue to monitor. No additional work-up needed currently.      ESRD on HD   Dialyzes MWF via L arm fistula. Per prior notes, volume removal has often been limited by intra dialytic hypotension and a fib with RVR. Noted that patient has been getting short of breath on Sundays in the past and there was discussion of adding a fourth dialysis day on Saturdays. As of now, nephrology  challenging dry weight to see if that helps.  - Nephrology consulted and following   - Continue PTA midodrine MWF before dialysis   - Continue PTA Renvela, Vit D.      HFpEF  Paroxysmal Atrial Fibrillation on Chronic Anticoagulation  Hypertension  Hyperlipidemia  Troponin elevation, Chronic Non-ischemic Myocardial Injury due to chronic heart failure with preserved ejection fraction and end-stage renal disease (85->85), no treatment indicated   On admission now, pt has markedly elevated BNP to 51,544 (though historically is in the 20,000-40,000 range), She does have small pleural effusions on CT, but no other overt evidence of volume overload or clinical HF.  *TTE 1/21/2025 with LVEF of 60%.  No valvular abnormality   * RHC 3/5/25 showing moderately elevated right and left-sided filling pressure; elevated wedge pressure consistent with pulmonary hypertension    - I/Os and daily weights ordered     - Continue PTA apixaban 5 mg BID  - Continue PTA atorvastatin 20 mg daily  - Continue PTA hydralazine 75 mg q8H  - Continue PTA amiodarone 200 mg change to daily as per discussion with [facility].   - Continue PTA amlodipine 10 m qday     - Volume management per Nephrology     Vaginal Bleeding   Pt has had intermittent vaginal bleeding this stay. Nursing and pt quite convinced it is in fact vaginal rather than rectal or urethral.   - Transvaginal ultrasound ordered for further eval negative for any concerning findings.   - Recommend outpatient Gyn follow-up      Anxiety/Depression/Insomnia  - Continue PTA zoloft      Anemia of Chronic Disease  Hemoglobin 9.2 on admission, has trended down since admission to 7.8.   - Hgb 7.7 > 7.6 > 8.7 > 9.3 > 8.6                - S/p 1U pRBC on 4/9/25  - Continue to monitor      Type II DM  - Increase to Lantus 20U BID given higher sugars while on Prednisone    - Plan to discharge on home dose   - Continue HDSSI     Physical deconditioning from medical illness, senile frailty.  S/p left  "above-knee amputation, traumatic  Patient from TCU, daughter hopeful pt will be able to discharge back home with services rather than going back to TCU.   - PT/OT consulted   - Will plan for home discharge      Sacrococcygeal Pressure injury, Stage 3, present on admission  Wound care team followed.  Pressure injury prevention.      Obesity with a BMI of 34.  Increase all-cause mortality and morbidity.    Clinically Significant Risk Factors         # Hyponatremia: Lowest Na = 126 mmol/L in last 2 days, will monitor as appropriate  # Hypochloremia: Lowest Cl = 87 mmol/L in last 2 days, will monitor as appropriate   # Hypercalcemia: Highest Ca = 10.7 mg/dL in last 2 days, will monitor as appropriate    # Hypoalbuminemia: Lowest albumin = 3.1 g/dL at 4/7/2025  6:41 AM, will monitor as appropriate     # Hypertension: Noted on problem list  # Chronic heart failure with preserved ejection fraction: heart failure noted on problem list and last echo with EF >50%          # DMII: A1C = 6.5 % (Ref range: <5.7 %) within past 6 months   # Overweight: Estimated body mass index is 29.43 kg/m  as calculated from the following:    Height as of this encounter: 1.676 m (5' 6\").    Weight as of this encounter: 82.7 kg (182 lb 5.1 oz).   # Moderate Malnutrition: based on nutrition assessment and treatment provided per dietitian's recommendations.    # Financial/Environmental Concerns:             Diet: Renal Diet (dialysis)  Adult Formula Drip Feeding: Continuous [employer] Renal Support; Gastrostomy; Goal Rate: 80; mL/hr; From: 6:00 PM; To: 6:00 AM; 4/13: Increase TF rate to 80 ml/hr x 12 hrs.     DVT Prophylaxis: DOAC   Bradshaw Catheter: Not present  Lines: None     Cardiac Monitoring: None  Code Status: No CPR- Pre-arrest intubation OK      Disposition Plan       Expected Discharge Date: 04/16/2025,  3:00 PM    Destination: home with family;inpatient rehabilitation facility  Discharge Comments: From [facility]  SW following,   IV " abx plan per ID      Entered: Debbie Zarco MD 04/15/2025, 3:08 PM     Notes Reviewed: ID, Spoke to Pulm    Family Updated: Spoke to Caroline Gray via phone on 4/15/25    Care Team Updated: Nursing and CM updated     Disposition: Tentative plan to discharge home with daughter pending pulmonary recs, DME equipment, and getting everything arranged.       Medically Ready for Discharge: Anticipated Tomorrow      Debbie Zarco MD  Hospitalist Service   [facility]  Securely message with the Vocera Web Console (learn more here)         Medical Decision Making       50 MINUTES SPENT BY ME on the date of service doing chart review, history, exam, documentation & further activities per the note.           Interval History     No acute overnight events.     This morning the patient states that she is doing well. Had some very vivid dreams last night. Breathing is better. Overall feeling better.     -Data reviewed today: I reviewed all new labs and imaging results over the last 24 hours.     Physical Exam   Temp: 98.4  F (36.9  C) Temp src: Oral BP: (!) 170/60 Pulse: 67   Resp: 23 SpO2: 96 % O2 Device: None (Room air)    Vitals:    04/13/25 0550 04/14/25 0458 04/14/25 1558   Weight: 86 kg (189 lb 9.5 oz) 87.9 kg (193 lb 12.6 oz) 82.7 kg (182 lb 5.1 oz)     Vital Signs with Ranges  Temp:  [97.9  F (36.6  C)-99.4  F (37.4  C)] 98.4  F (36.9  C)  Pulse:  [67-79] 67  Resp:  [17-23] 23  BP: (126-170)/() 170/60  FiO2 (%):  [30 %] 30 %  SpO2:  [92 %-98 %] 96 %  I/O last 3 completed shifts:  In: 1483 [P.O.:510; NG/GT:973]  Out: -       Constitutional: Awake, alert, cooperative, no apparent distress.    HEENT: PERRL, Normocephalic, without obvious abnormality, atraumatic, oral pharynx with moist mucus membranes  Pulmonary: Diminished lung sounds in the bases, coarse sounding cough  Cardiovascular: Regular rate and rhythm, normal S1 and S2  GI: Normal bowel sounds, soft, non-distended,  non-tender.  Skin/Integumen: Visualized skin appeared clear.  Neuro: CN II-XII grossly intact.  Psych:  Alert and oriented x 3.  Extremities: No lower extremity edema noted      Medications   Current Facility-Administered Medications   Medication Dose Route Frequency Provider Last Rate Last Admin    dextrose 10% infusion   Intravenous Continuous PRN Ekaterina Gayle MD        May take oral meds with a sip of water, the morning of SHAHNAZ procedure.   Does not apply Continuous PRN Ganesh Lawson MD         Current Facility-Administered Medications   Medication Dose Route Frequency Provider Last Rate Last Admin    - MEDICATION INSTRUCTIONS for Dialysis Patients -   Does not apply See Admin Instructions Ekaterina Gayle MD        amiodarone (PACERONE) tablet 200 mg  200 mg Oral or Feeding Tube Daily Owen Andrade MD   200 mg at 04/15/25 0853    amLODIPine (NORVASC) tablet 10 mg  10 mg Oral or Feeding Tube Once per day on Sunday Tuesday Thursday Saturday Ekaterina Gayle MD   10 mg at 04/15/25 0853    apixaban ANTICOAGULANT (ELIQUIS) tablet 5 mg  5 mg Oral or Feeding Tube BID Ekaterina Gayle MD   5 mg at 04/15/25 0853    atorvastatin (LIPITOR) tablet 20 mg  20 mg Oral or Feeding Tube QPM Ekaterina Gayle MD   20 mg at 04/14/25 2059    budesonide (PULMICORT) neb solution 0.5 mg  0.5 mg Nebulization BID Eren Mandel MD   0.5 mg at 04/15/25 0653    cetirizine (zyrTEC) tablet 10 mg  10 mg Oral or Feeding Tube At Bedtime Ekaterina Gayle MD   10 mg at 04/14/25 2059    formoterol (PERFOROMIST) neb solution 20 mcg  20 mcg Nebulization Q12H Eren Mandel MD   20 mcg at 04/15/25 0653    hydrALAZINE (APRESOLINE) tablet 75 mg  75 mg Oral or Feeding Tube Q8H Ekaterina Gayle MD   75 mg at 04/15/25 0852    insulin aspart (NovoLOG) injection (RAPID ACTING)  1-10 Units Subcutaneous TID AC Owen Andrade MD   7 Units at 04/15/25 0853    insulin aspart (NovoLOG) injection (RAPID ACTING)  1-7 Units  Subcutaneous At Bedtime Owen Andrade MD   6 Units at 04/14/25 2116    insulin glargine (LANTUS PEN) injection 20 Units  20 Units Subcutaneous BID Debbie Zarco MD   20 Units at 04/15/25 0854    ipratropium - albuterol 0.5 mg/2.5 mg/3 mL (DUONEB) neb solution 3 mL  3 mL Nebulization Q4H WA Owen Andrade MD   3 mL at 04/15/25 1500    midodrine (PROAMATINE) tablet 5 mg  5 mg Oral Once per day on Monday Wednesday Friday Ekaterina Gayle MD   5 mg at 04/09/25 1342    pantoprazole (PROTONIX) 2 mg/mL suspension 40 mg  40 mg [facility] Ekaterina Gayle MD   40 mg at 04/15/25 0851    polyethylene glycol (MIRALAX) Packet 17 g  17 g Oral or Feeding Tube BID Ekaterina Gayle MD   17 g at 04/13/25 2051    predniSONE (DELTASONE) tablet 20 mg  20 mg Oral Daily Debbie Zarco MD   20 mg at 04/15/25 0853    sennosides (SENOKOT) tablet 2 tablet  2 tablet Oral BID Blaise Holden MD   2 tablet at 04/15/25 0853    sertraline (ZOLOFT) tablet 75 mg  75 mg Oral or Feeding Tube Daily Ekaterina Gayle MD   75 mg at 04/15/25 0853    sevelamer carbonate (RENVELA) tablet 1,600 mg  1,600 mg Oral TID w/meals Ekaterina Gayle MD   1,600 mg at 04/15/25 0853    sodium chloride (PF) 0.9% PF flush 3 mL  3 mL Intravenous Q8H Ganesh Lawson MD   3 mL at 04/15/25 0139    vancomycin place cardoso - receiving intermittent dosing  1 each Intravenous See Admin Instructions Ekaterina Gayle MD        vitamin D3 (CHOLECALCIFEROL) tablet 50 mcg  50 mcg Oral or Feeding Tube Daily Ekaterina Gayle MD   50 mcg at 04/15/25 0853       Data   Recent Labs   Lab 04/15/25  1416 04/15/25  1221 04/15/25  0717 04/14/25  1320 04/14/25  0807 04/14/25  0736 04/14/25  0638 04/13/25  0825 04/13/25  0540 04/12/25  1251 04/12/25  1002 04/11/25  0737 04/11/25  0517 04/10/25  1716 04/10/25  1616   WBC  --   --   --   --   --   --   --   --  5.8  --  5.8  --  6.9  --   --    HGB  --   --   --   --  8.6*  --   --    --  9.2*  --  9.3*  --  8.3*  --   --    MCV  --   --   --   --   --   --   --   --  90  --  91  --  92  --   --    PLT  --   --   --   --   --   --   --   --  255  --  239  --  216  --   --    NA  --   --   --   --   --   --  126*  --  130*  --  134*  --  134*  --   --    POTASSIUM  --   --   --   --   --   --  4.4  --  4.3  --  4.1  --  3.8  --   --    CHLORIDE  --   --   --   --   --   --  87*  --  90*  --  94*  --  93*  --   --    CO2  --   --   --   --   --   --  29  --  28  --  29  --  32*  --   --    BUN  --   --   --   --   --   --  82.4*  --  55.3*  --  35.9*  --  58.8*  --   --    CR  --   --   --   --   --   --  5.15*  --  3.93*  --  2.73*  --  3.90*  --   --    ANIONGAP  --   --   --   --   --   --  10  --  12  --  11  --  9  --   --    JODY  --   --   --   --   --   --  10.7*  --  11.0*  --  10.9*  --  10.4  --   --    * 92 307*   < >  --    < > 267*   < > 270*   < > 164*   < > 170*   < >  --    PROTTOTAL  --   --   --   --   --   --   --   --   --   --   --   --   --   --  6.0*    < > = values in this interval not displayed.       No results found for this or any previous visit (from the past 24 hours).

## 2025-04-15 NOTE — PROGRESS NOTES
Care Management Follow Up    Length of Stay (days): 18    Expected Discharge Date: 04/16/2025     Concerns to be Addressed:       Patient plan of care discussed at interdisciplinary rounds: Yes    Anticipated Discharge Disposition: Home, Home Care, Home Infusion, Dialysis Services, DME  Disposition Comments: Planning discharge in 1-2 days        Anticipated Discharge Services: Home Care, Transportation Services  Anticipated Discharge DME: Bed, Other (see comment) (Needs nebulizer, lift device with sling, CPAP supplies and new O2 orders)    Patient/family educated on Medicare website which has current facility and service quality ratings: no  Education Provided on the Discharge Plan: Yes  Patient/Family in Agreement with the Plan: yes    Referrals Placed by CM/SW: External Care Coordination, Homecare, Home Infusion, Durable Medical Equipment (DME), Transportation, Insurance Verification for DME  Private pay costs discussed: Not applicable    Discussed  Partnership in Safe Discharge Planning  document with patient/family: No     Handoff Completed: Yes, [facility] PCP: Internal handoff referral completed    Additional Information:  Patient transferred back to [address] [place] [address] from [facility].  Appreciate help from Colleague, Manuel, with ensuring [facility] had all information needed for an electric hospital bed.  CC has reviewed records.  Patient had been at a TCU since February.  Patient's Daughter, Caroline, was having to spend nights with her mother to ensure she was receiving the care she needed.  Caroline has now decide to care for her mother at home.  Caroline had cared for her mother prior to the last TCU stay.  Since the time she last cared for her mother, she is now on tube feedings and patient was able to pivot transfer and is now needing a lift.  Patient has been on Bipap 14/7 here.  Patient has a home CPAP.  The case was discussed with RT Aakash with [facility] ([facility]  He reported when patient did use her CPAP, she did  well and did not feel patient is needing a Bipap at this point.  Patient has a virtual Sleep Medicine appointment scheduled for today.  Caroline noted this appointment was so that her mother could get new CPAP supplies.  In discussion with Aakash, they determined patient does not require a face to face for supplies.  [facility] has helped in sending supplies that patient is needing.  Have been in Vocera communication with Pulmonary, Dr Sumit Dalal concerning needing help with the supply script and Caroline wanting to discuss what Pulmonary is planning for 4/9 CT chest results.  Patient has home O2 for nocturnal use only.  Patient has been stable with RA sats >92%, so unsure if any additional O2 orders are needed for discharge.  Patient will need a nebulizer if she needs to continue neb treatments 4X per days.    ID is recommending vancomycin with dialysis for 6 weeks (to 5/10/25) for treatment of E. Faecalis bacteremia.    [facility] will manage patient's tube feedings and [facility] has accepted for home care services in addition.    Called Haley Sandovaltowheather to inquire what was wrong with the sling that patient was using when she went to dialysis from the TCU, as Caroline thought it was because it was a whole body and with her AKA, there was problems.  This unit is closed on Tuesdays, so cannot bring understanding of this.    [facility] has confirmed patient has been approved for an electric bed and mattress and renetta lift with whole body sling.  They can have delivery by tomorrow and will determine this after talking with Caroline.    Patient will most likely need all medications filled.    Patient's Elderly Waiver CM, Ellen Guerrero, wanted to be notified the day before discharge to reinstate waivered services.  A message has been left with both numbers she had originally provided, awaiting call back.    Will set up stretcher transport after Pulmonary sees patient today and Hospitalist determines discharge  readiness.    Next Steps:   Care Management will continue to follow.    Briana Alcocer, RN  Inpatient Care Management  [phone]

## 2025-04-15 NOTE — PLAN OF CARE
Summary: Parainfluenza, respiratory failure-Dialysis (MWF), abdominal pain, Sepsis, CHF     DATE & TIME: 4/14/25, 1930 - 0730    Cognitive Concerns/ Orientation: A&Ox4   BEHAVIOR & AGGRESSION TOOL COLOR: Green   CIWA SCORE: NA   ABNL VS/O2: BP elevated at times, other VSS on room air. Used BiPAP overnight  MOBILITY: Total lift, left AKA  PAIN MANAGMENT: Denied  DIET: Renal, TF 80 ml/ hr (6 pm-6 am), takes pills whole with water   BOWEL/BLADDER: Smears x 2, no urine this shift   ABNL LAB/BG:  and 389  DRAIN/DEVICES: PIV SL,left fistula  TELEMETRY RHYTHM: NA  SKIN: Blanchable redness on coccyx with some excoriation. Mepilex in place. Turned and repositioned   TESTS/PROCEDURES: None  D/C DAY/GOALS/PLACE: Pending improvement,  to home with daugher   OTHER IMPORTANT INFO: Lungs diminished, frequent productive cough    Goal Outcome Evaluation:      Plan of Care Reviewed With: patient    Overall Patient Progress: improvingOverall Patient Progress: improving

## 2025-04-16 ENCOUNTER — PATIENT OUTREACH (OUTPATIENT)
Dept: GERIATRIC MEDICINE | Facility: CLINIC | Age: 75
End: 2025-04-16
Payer: COMMERCIAL

## 2025-04-16 LAB
ALBUMIN SERPL BCG-MCNC: 3.5 G/DL (ref 3.5–5.2)
ANION GAP SERPL CALCULATED.3IONS-SCNC: 12 MMOL/L (ref 7–15)
BUN SERPL-MCNC: 76.5 MG/DL (ref 8–23)
CALCIUM SERPL-MCNC: 10.9 MG/DL (ref 8.8–10.4)
CHLORIDE SERPL-SCNC: 93 MMOL/L (ref 98–107)
CREAT SERPL-MCNC: 4.34 MG/DL (ref 0.51–0.95)
EGFRCR SERPLBLD CKD-EPI 2021: 10 ML/MIN/1.73M2
GLUCOSE BLDC GLUCOMTR-MCNC: 112 MG/DL (ref 70–99)
GLUCOSE BLDC GLUCOMTR-MCNC: 225 MG/DL (ref 70–99)
GLUCOSE BLDC GLUCOMTR-MCNC: 234 MG/DL (ref 70–99)
GLUCOSE BLDC GLUCOMTR-MCNC: 309 MG/DL (ref 70–99)
GLUCOSE BLDC GLUCOMTR-MCNC: 351 MG/DL (ref 70–99)
GLUCOSE BLDC GLUCOMTR-MCNC: 385 MG/DL (ref 70–99)
GLUCOSE SERPL-MCNC: 299 MG/DL (ref 70–99)
HCO3 SERPL-SCNC: 29 MMOL/L (ref 22–29)
HGB BLD-MCNC: 9.3 G/DL (ref 11.7–15.7)
PHOSPHATE SERPL-MCNC: 2.1 MG/DL (ref 2.5–4.5)
POTASSIUM SERPL-SCNC: 4.4 MMOL/L (ref 3.4–5.3)
SODIUM SERPL-SCNC: 134 MMOL/L (ref 135–145)
VANCOMYCIN SERPL-MCNC: 26 UG/ML

## 2025-04-16 PROCEDURE — 94660 CPAP INITIATION&MGMT: CPT

## 2025-04-16 PROCEDURE — 250N000013 HC RX MED GY IP 250 OP 250 PS 637: Performed by: INTERNAL MEDICINE

## 2025-04-16 PROCEDURE — 250N000009 HC RX 250: Mod: JW | Performed by: INTERNAL MEDICINE

## 2025-04-16 PROCEDURE — 94640 AIRWAY INHALATION TREATMENT: CPT

## 2025-04-16 PROCEDURE — 99222 1ST HOSP IP/OBS MODERATE 55: CPT | Performed by: INTERNAL MEDICINE

## 2025-04-16 PROCEDURE — 85018 HEMOGLOBIN: CPT | Performed by: STUDENT IN AN ORGANIZED HEALTH CARE EDUCATION/TRAINING PROGRAM

## 2025-04-16 PROCEDURE — 250N000011 HC RX IP 250 OP 636: Performed by: STUDENT IN AN ORGANIZED HEALTH CARE EDUCATION/TRAINING PROGRAM

## 2025-04-16 PROCEDURE — 82310 ASSAY OF CALCIUM: CPT | Performed by: STUDENT IN AN ORGANIZED HEALTH CARE EDUCATION/TRAINING PROGRAM

## 2025-04-16 PROCEDURE — 999N000127 HC STATISTIC PERIPHERAL IV START W US GUIDANCE

## 2025-04-16 PROCEDURE — 250N000009 HC RX 250: Performed by: INTERNAL MEDICINE

## 2025-04-16 PROCEDURE — 94640 AIRWAY INHALATION TREATMENT: CPT | Mod: 76

## 2025-04-16 PROCEDURE — 36415 COLL VENOUS BLD VENIPUNCTURE: CPT | Performed by: STUDENT IN AN ORGANIZED HEALTH CARE EDUCATION/TRAINING PROGRAM

## 2025-04-16 PROCEDURE — 90935 HEMODIALYSIS ONE EVALUATION: CPT | Performed by: INTERNAL MEDICINE

## 2025-04-16 PROCEDURE — 250N000013 HC RX MED GY IP 250 OP 250 PS 637: Performed by: STUDENT IN AN ORGANIZED HEALTH CARE EDUCATION/TRAINING PROGRAM

## 2025-04-16 PROCEDURE — 250N000011 HC RX IP 250 OP 636: Performed by: INTERNAL MEDICINE

## 2025-04-16 PROCEDURE — 80202 ASSAY OF VANCOMYCIN: CPT | Performed by: STUDENT IN AN ORGANIZED HEALTH CARE EDUCATION/TRAINING PROGRAM

## 2025-04-16 PROCEDURE — 999N000157 HC STATISTIC RCP TIME EA 10 MIN

## 2025-04-16 PROCEDURE — 90935 HEMODIALYSIS ONE EVALUATION: CPT

## 2025-04-16 PROCEDURE — 120N000001 HC R&B MED SURG/OB

## 2025-04-16 PROCEDURE — 258N000003 HC RX IP 258 OP 636: Performed by: INTERNAL MEDICINE

## 2025-04-16 PROCEDURE — 634N000001 HC RX 634: Mod: JZ | Performed by: INTERNAL MEDICINE

## 2025-04-16 PROCEDURE — 999N000040 HC STATISTIC CONSULT NO CHARGE VASC ACCESS

## 2025-04-16 PROCEDURE — 99233 SBSQ HOSP IP/OBS HIGH 50: CPT | Performed by: STUDENT IN AN ORGANIZED HEALTH CARE EDUCATION/TRAINING PROGRAM

## 2025-04-16 PROCEDURE — 250N000012 HC RX MED GY IP 250 OP 636 PS 637: Performed by: STUDENT IN AN ORGANIZED HEALTH CARE EDUCATION/TRAINING PROGRAM

## 2025-04-16 RX ORDER — ALBUMIN (HUMAN) 12.5 G/50ML
50 SOLUTION INTRAVENOUS
Status: DISCONTINUED | OUTPATIENT
Start: 2025-04-16 | End: 2025-04-16

## 2025-04-16 RX ORDER — HYDRALAZINE HYDROCHLORIDE 50 MG/1
100 TABLET, FILM COATED ORAL EVERY 8 HOURS
Status: DISCONTINUED | OUTPATIENT
Start: 2025-04-16 | End: 2025-04-17 | Stop reason: HOSPADM

## 2025-04-16 RX ORDER — SEVELAMER CARBONATE 800 MG/1
800 TABLET, FILM COATED ORAL
Status: DISCONTINUED | OUTPATIENT
Start: 2025-04-16 | End: 2025-04-17 | Stop reason: HOSPADM

## 2025-04-16 RX ORDER — VANCOMYCIN HYDROCHLORIDE 500 MG/10ML
500 INJECTION, POWDER, LYOPHILIZED, FOR SOLUTION INTRAVENOUS ONCE
Status: COMPLETED | OUTPATIENT
Start: 2025-04-16 | End: 2025-04-16

## 2025-04-16 RX ORDER — HEPARIN SODIUM 1000 [USP'U]/ML
500 INJECTION, SOLUTION INTRAVENOUS; SUBCUTANEOUS CONTINUOUS
Status: DISCONTINUED | OUTPATIENT
Start: 2025-04-16 | End: 2025-04-16

## 2025-04-16 RX ADMIN — EPOETIN ALFA-EPBX 4000 UNITS: 4000 INJECTION, SOLUTION INTRAVENOUS; SUBCUTANEOUS at 10:20

## 2025-04-16 RX ADMIN — CETIRIZINE HYDROCHLORIDE 10 MG: 10 TABLET, FILM COATED ORAL at 20:54

## 2025-04-16 RX ADMIN — AMIODARONE HYDROCHLORIDE 200 MG: 200 TABLET ORAL at 13:26

## 2025-04-16 RX ADMIN — IPRATROPIUM BROMIDE AND ALBUTEROL SULFATE 3 ML: .5; 3 SOLUTION RESPIRATORY (INHALATION) at 07:35

## 2025-04-16 RX ADMIN — BUDESONIDE 0.5 MG: 0.5 INHALANT RESPIRATORY (INHALATION) at 07:35

## 2025-04-16 RX ADMIN — APIXABAN 5 MG: 5 TABLET, FILM COATED ORAL at 13:26

## 2025-04-16 RX ADMIN — HYDRALAZINE HYDROCHLORIDE 100 MG: 50 TABLET ORAL at 23:34

## 2025-04-16 RX ADMIN — ATORVASTATIN CALCIUM 20 MG: 20 TABLET, FILM COATED ORAL at 20:54

## 2025-04-16 RX ADMIN — VANCOMYCIN HYDROCHLORIDE 500 MG: 500 INJECTION, POWDER, LYOPHILIZED, FOR SOLUTION INTRAVENOUS at 21:06

## 2025-04-16 RX ADMIN — FORMOTEROL FUMARATE 20 MCG: 20 SOLUTION RESPIRATORY (INHALATION) at 07:36

## 2025-04-16 RX ADMIN — IPRATROPIUM BROMIDE AND ALBUTEROL SULFATE 3 ML: .5; 3 SOLUTION RESPIRATORY (INHALATION) at 15:32

## 2025-04-16 RX ADMIN — HEPARIN SODIUM 500 UNITS: 1000 INJECTION, SOLUTION INTRAVENOUS; SUBCUTANEOUS at 10:21

## 2025-04-16 RX ADMIN — HEPARIN SODIUM 500 UNITS/HR: 1000 INJECTION, SOLUTION INTRAVENOUS; SUBCUTANEOUS at 10:21

## 2025-04-16 RX ADMIN — PREDNISONE 20 MG: 20 TABLET ORAL at 13:27

## 2025-04-16 RX ADMIN — IPRATROPIUM BROMIDE AND ALBUTEROL SULFATE 3 ML: .5; 3 SOLUTION RESPIRATORY (INHALATION) at 19:27

## 2025-04-16 RX ADMIN — SEVELAMER CARBONATE 800 MG: 800 TABLET, FILM COATED ORAL at 13:26

## 2025-04-16 RX ADMIN — HYDRALAZINE HYDROCHLORIDE 75 MG: 25 TABLET ORAL at 00:06

## 2025-04-16 RX ADMIN — Medication 40 MG: at 13:28

## 2025-04-16 RX ADMIN — SODIUM CHLORIDE 200 ML: 9 INJECTION, SOLUTION INTRAVENOUS at 10:21

## 2025-04-16 RX ADMIN — HYDRALAZINE HYDROCHLORIDE 100 MG: 50 TABLET ORAL at 15:54

## 2025-04-16 RX ADMIN — SODIUM CHLORIDE 250 ML: 9 INJECTION, SOLUTION INTRAVENOUS at 10:21

## 2025-04-16 RX ADMIN — MIDODRINE HYDROCHLORIDE 5 MG: 5 TABLET ORAL at 08:23

## 2025-04-16 RX ADMIN — APIXABAN 5 MG: 5 TABLET, FILM COATED ORAL at 20:54

## 2025-04-16 RX ADMIN — SEVELAMER CARBONATE 800 MG: 800 TABLET, FILM COATED ORAL at 18:25

## 2025-04-16 RX ADMIN — LIDOCAINE HYDROCHLORIDE 0.5 ML: 10 INJECTION, SOLUTION EPIDURAL; INFILTRATION; INTRACAUDAL; PERINEURAL at 10:20

## 2025-04-16 RX ADMIN — SERTRALINE HYDROCHLORIDE 75 MG: 50 TABLET ORAL at 13:27

## 2025-04-16 RX ADMIN — Medication: at 10:20

## 2025-04-16 RX ADMIN — FORMOTEROL FUMARATE 20 MCG: 20 SOLUTION RESPIRATORY (INHALATION) at 19:27

## 2025-04-16 RX ADMIN — BUDESONIDE 0.5 MG: 0.5 INHALANT RESPIRATORY (INHALATION) at 19:27

## 2025-04-16 ASSESSMENT — ACTIVITIES OF DAILY LIVING (ADL)
ADLS_ACUITY_SCORE: 93
ADLS_ACUITY_SCORE: 92
ADLS_ACUITY_SCORE: 93
ADLS_ACUITY_SCORE: 92
ADLS_ACUITY_SCORE: 93
ADLS_ACUITY_SCORE: 92
ADLS_ACUITY_SCORE: 93
ADLS_ACUITY_SCORE: 92
ADLS_ACUITY_SCORE: 93
ADLS_ACUITY_SCORE: 92
ADLS_ACUITY_SCORE: 93
ADLS_ACUITY_SCORE: 92
ADLS_ACUITY_SCORE: 92
ADLS_ACUITY_SCORE: 93

## 2025-04-16 NOTE — PROGRESS NOTES
[facility] Care Coordination Contact    Arranged transportation thru [facility] provider [facility] (phone: [phone]) for the below appt:    Appt Date & Time: M, W & F @ 2 PM   (Scheduled thru 4/30 at this time)     Clinic Name & Address:  [facility] [address] [place] [address]     time:  1:00PM  Return ride  time: 6:15 PM    Notified CC & daughter Caroline of ride details.       Leslie Braun  Care Management Specialist   [facility]   [phone]

## 2025-04-16 NOTE — PROGRESS NOTES
Pt seen for neb tx. BS diminished and coarse. On RA. Tolerated tx. Good congested cough. Encouraged to use Aerobika. Will continue to monitor.    Yahir Ritchie, RT

## 2025-04-16 NOTE — PROGRESS NOTES
Nebulizer Documentation  I attest that I have seen and evaluated Supriya Herr. She has a diagnosis of J20.9 - Acute bronchitis, unspecified and a nebulizer machine is needed to administer medication to improve breathing passages.     I, the undersigned, certify that the above prescribed supplies are medically necessary for this patient and is both reasonable and necessary in reference to accepted standards of medical and necessary in reference to accepted standards of medical practice in the treatment of this patient's condition and is not prescribed as a convenience.    Debbie Zarco MD  Hospitalist Service  [facility]  Securely message with the Vocera Web Console (learn more here)

## 2025-04-16 NOTE — PROGRESS NOTES
Potassium   Date Value Ref Range Status   04/16/2025 4.4 3.4 - 5.3 mmol/L Final   02/02/2022 4.7 3.4 - 5.3 mmol/L Final   04/17/2021 4.2 3.4 - 5.3 mmol/L Final     Hemoglobin   Date Value Ref Range Status   04/16/2025 9.3 (L) 11.7 - 15.7 g/dL Final   04/16/2021 10.9 (L) 11.7 - 15.7 g/dL Final     Creatinine   Date Value Ref Range Status   04/16/2025 4.34 (H) 0.51 - 0.95 mg/dL Final   04/17/2021 5.98 (H) 0.52 - 1.04 mg/dL Final     Urea Nitrogen   Date Value Ref Range Status   04/16/2025 76.5 (H) 8.0 - 23.0 mg/dL Final   11/24/2021 37 (H) 7 - 30 mg/dL Final   04/17/2021 68 (H) 7 - 30 mg/dL Final     Sodium   Date Value Ref Range Status   04/16/2025 134 (L) 135 - 145 mmol/L Final   04/17/2021 137 133 - 144 mmol/L Final     INR   Date Value Ref Range Status   03/30/2025 1.47 (H) 0.85 - 1.15 Final   04/16/2021 1.14 0.86 - 1.14 Final       DIALYSIS PROCEDURE NOTE  Hepatitis status of previous patient on machine log was checked and verified ok to use with this patients hepatitis status.  Patient dialyzed for 3.5 hrs. on a K3 bath with a net fluid removal of  2.6L.  A BFR of 400 ml/min was obtained via a L AVF using 15 gauge needles.      The treatment plan was discussed with Dr. Oneill during the treatment.    Total heparin received during the treatment: 1700 units.   Needle cannulation sites held x 10 min.       Meds  given: Epo 4000 units, lidocaine   Complications: none      Person educated: patient. Knowledge base substantial. Barriers to learning: none. Educated on procedure via verbal mode. patient verbalized understanding.   ICEBOAT? Timeout performed pre-treatment  I: Patient was identified using 2 identifiers  C:  Consent Signed Yes  E: Equipment preventative maintenance is current and dialysis delivery system OK to use  B: Hepatitis B Surface Antigen: negative; Draw Date: 04/04/2025      Hepatitis B Surface Antibody: susceptible; Draw Date: 04/04/2025  O: Dialysis orders present and complete prior to  treatment  A: Vascular access verified and assessed prior to treatment  T: Treatment was performed at a clinically appropriate time  ?: Patient was allowed to ask questions and address concerns prior to treatment  See Adult Hemodialysis flowsheet in [facility] for further details and post assessment.  Machine water alarm in place and functioning. Transducer pods intact and checked every 15min.   Pt assisted with repositioning throughout dialysis treatment.  Pt returned via bed.  Chlorine/Chloramine water system checked every 4 hours.  Outpatient Dialysis at [facility] on [facility]       Post treatment report given to bedside RN (see flowsheet) regarding 2.6L of fluid removed, last /44.    Please remove patient dressing on AVF and AVG needle sites 24 hours after dialysis. If leaking occurs please apply a Band-Aid.     Kara Nonweiler, RN

## 2025-04-16 NOTE — PROGRESS NOTES
Care Management Follow Up    Length of Stay (days): 19    Expected Discharge Date: 04/17/2025     Concerns to be Addressed:       Patient plan of care discussed at interdisciplinary rounds: Yes    Anticipated Discharge Disposition: Home, Home Care, Home Infusion, Dialysis Services, DME  Disposition Comments: Planning discharge in 1-2 days     Anticipated Discharge Services: Home Care, Transportation Services  Anticipated Discharge DME: Bed, Other (see comment) (Needs nebulizer, lift device with sling, [facility]PC[facility] supplies and new O2 orders)    Patient/family educated on Medicare website which has current facility and service quality ratings: no  Education Provided on the Discharge Plan: Yes  Patient/Family in Agreement with the Plan: yes    Referrals Placed by CM/SW: External Care Coordination, Homecare, Home Infusion, Durable Medical Equipment (DME), Transportation, Insurance Verification for DME  Private pay costs discussed: Not applicable    Discussed  Partnership in Safe Discharge Planning  document with patient/family: No     Handoff Completed: Yes, [facility] PCP: Internal handoff referral completed    Additional Information:  Much time has been put into discharge planning with entire care team:  --[facility] ([phone])(Fax:[phone]) conversed with Santino.  They are aware of discharge planning for tomorrow and would like patient to arrive by 1:50 PM on Friday.  Usual dialysis schedule is MWF at 2:15 PM. Dr Oneill will call in orders and order for Vancomycin for treatment of E. Faecalis bacteremia.     --Patient's [facility] . Ellen Guerrero ([phone]) is aware of the discharge plan and is setting up patient's dialysis transportation and is aware of time.    --[facility] will be managing patient's tube feedings.  The RN Liaison, Celeste Miner has conversed with both patient and Daughter Caroline.  They will need signed orders in by noon tomorrow and will make arrangements as  to time of delivery of product and what time the nurse would be at their home tomorrow afternoon for teaching.  Celeste noted that they may need a nurse to go to the home a few days.    --[facility] has accepted patient for SN/PT/OT.  Had conversation with Kelly, .  This agency is aware of paln for discharge tomorrow and they are following progress on EPIC.  They will be calling daughter for scheduling after they receive the discharge order.    --[facility] has delivered the hospital bed and mattress and Rene lift and whole body sling.    --[facility] will deliver the home nebulizer here.  The orders for CPAP supplies were faxed and received.  Patient's daughter was told she will need to go to the [facility] showroom ([facility]) to pick out mask and  this equipment.  They will not deliver to hospital.    Patient's Daughter Caroline sounded overwhelmed today.  Explained that we would arrange stretcher transport for tomorrow afternoon.  She then started to name off multi miscellaneous items that she would need such as chux, dressings, oximeter, bed pain, pill , etc.  She wanted to talk to patient's [facility] CM before fully committing that she is OK with patient being discharged tomorrow.  Explained discharging on a Thursday is better than a Friday.  Hospitalist reported patient is medically stable for discharge tomorrow, but wants to make sure the daughter is ready.  This is a large commitment by patient's daughter as she has another job working from home.  Caroline is compensated 7 hours per day for caring for her mother.  This will be a 24/7 caregiver role.    PT order was placed to [facility].  [facility]    To ensure we are able to get a stretcher transport, transportation was set up for tomorrow with transport window ([phone]).    Next Steps:   Care Management will  continue to follow    Briana Alcocer, RN  Inpatient Care Management  [phone]

## 2025-04-16 NOTE — PHARMACY-VANCOMYCIN DOSING SERVICE
Pharmacy Vancomycin Note  Date of Service 2025  Patient's  1950   75 year old, female    Indication: Bacteremia  Day of Therapy: 20  Current vancomycin regimen:  Intermittent dosing  Current vancomycin monitoring method: Renal Replacement Therapy  Current vancomycin therapeutic monitoring goal: 15-20 mg/L    Current estimated CrCl = Estimated Creatinine Clearance: 12 mL/min (A) (based on SCr of 4.34 mg/dL (H)).    Creatinine for last 3 days  2025:  6:38 AM Creatinine 5.15 mg/dL  4/15/2025:  7:15 PM Creatinine 3.64 mg/dL  2025:  6:54 AM Creatinine 4.34 mg/dL    Recent Vancomycin Levels (past 3 days)  2025:  6:38 AM Vancomycin 27.0 ug/mL  2025:  6:54 AM Vancomycin 26.0 ug/mL    Vancomycin IV Administrations (past 72 hours)                     vancomycin (VANCOCIN) 500 mg vial to attach to  mL bag (mg) 500 mg New Bag 25                    Nephrotoxins and other renal medications (From now, onward)      Start     Dose/Rate Route Frequency Ordered Stop    25 1003  vancomycin place cardoso - receiving intermittent dosing         1 each Intravenous SEE ADMIN INSTRUCTIONS 25 1003                 Contrast Orders - past 72 hours (72h ago, onward)      None            Interpretation of levels and current regimen:  Vancomycin level is reflective of  appropriate pre-HD level, assuming 30% removal by dialysis     Renal Function: ESRD on Dialysis    Plan:  Give vancomycin  mg x1 (6mg/kg) after dialysis today .  Vancomycin monitoring method: Renal Replacement Therapy  Vancomycin therapeutic monitoring goal: 15-20 mg/L  Pharmacy will check vancomycin levels as appropriate in 1-3 Days.    DAIJA CHRISTIAN [facility]

## 2025-04-16 NOTE — PROGRESS NOTES
"[facility]  Hospitalist Progress Note    Assessment & Plan   Supriya Herr is a 75 year old female with very complex past medical history including end-stage renal disease on hemodialysis, HFpEF, paroxysmal atrial fibrillation, hypertension, hyperlipidemia, anxiety, depression, chronic anemia, dysphagia status post PEG tube, type 2 diabetes, left traumatic AKA and recent hospitalization with multifactorial respiratory failure who was admitted on 3/27/2025 with abdominal pain and retching.      Pf note, pt with multiple recent hospitalizations.      \"Hospitalized at [facility] from 3/2 - 3/6/2025 with shortness of breath likely multifactorial.  Volume status was difficult to determine, underwent right heart cath on 3/5 showed mildly elevated right atrial and PCWP readings.  Was treated for acute respiratory failure likely from heart failure exacerbation, atrial fibrillation, end-stage renal disease and discharged to care facility with supplemental nocturnal oxygen.\"     \"Prolonged hospitalized at [facility] from 1/8 to 2/26/2025 for acute hypoxic respiratory failure multifactorial, was intubated ( 1/9-1/18), reintubated (1/20 - 1/25).  Then was treated for shock with pressors, influenza A pneumonia, hospital-acquired pneumonia with intravenous antibiotics, steroids, antivirals.  Then also noted to have heart failure exacerbation, recurrent A-fib with RVR, subsequent bradycardia.  Then also noted to have encephalopathy likely from infectious, metabolic etiology and delirium.  During that hospitalization was noted to have dysphagia, pharyngeal edema, was evaluated by ENT on 2/16.  No findings to explain dysphagia.  G-tube was placed by IR on 2/17 and was on nocturnal tube feeds.  Discharged to TCU for rehabilitation.\"     Dispo: Pt's daughter prefers her to return home rather than TCU. Pt will need a number of home medical supplies prior to being able to go home. These have been ordered in the discharge " navigator. It may take some time for them to be delivered to her home. Appreciate social work and care coordinator help in arranging this. Pt's daughter will also need teaching on how to do tube feedings. IV antibiotics should be arranged through nephrology so she can get them with dialysis.      Acute Respiratory Failure likely 2/2 Parainfluenza Bronchitis    Near RML Collapse   3mm Left Upper Pulmonary Nodule   From 4/6-4/8/25 had been fairly optimized in general, however despite objectively having good O2 sats and CXR 4/6 unremarkable, feels SOB at times and has wanted mask or NC in place. Blood gas repeats compensated. CT chest on 4/9/25 showed mild to moderate diffuse bronchial wall thickening, with associated mucus plugging in peripheral subsegmental branches, near complete right middle lobe collapse, new from prior and without obstructing endobronchial lesion identified. On 4/12/25 she was diagnosed with parainfluenza. She was started on prednisone 20 mg daily for bronchial congestion      - Spo2 > 90%                - Wean as able                - Weaned to room air on 4/14/25     - 4/10/25 US guided left thoracentesis produced 150 ml fluid     - Continue Duonebs Q4 hours while awake     - Continue prednisone 20mg daily for bronchial congestion on 4/11               - Plan for 5 day course     - Pulmonary consulted               - Spoke to Dr. Dalal who spoke to patients daughter on 4/16/25. Answered all of her questions. He recommends starting Augmentin for 2 weeks. Continue nebs. Finish steroids. He will arrange outpatient follow up.      Sepsis 2/2 E. Faecalis Bacteremia   Elevated Procalcitonin   Elevated CRP  On admission, WBC elevated to 17.4, Procal elevated to 1.3, and CRP elevated to 39.78.   Unclear infectious source. She has some mild non-specific urinary bladder wall thickening, but with ESRD makes very little urine. Also has a new MIKAYLA 3mm nodule which may be infectious or inflammatory, but no  respiratory complaints. COVID, flu RSV negative at admission. UA very abnormal but difficult to interpret in the setting of ESRD. Ultimately Ucx positive for E faecalis. Could be initial source or seeding from bacteremia.     - 3/27/25 Blood Cx: 4/4 bottles positive for Enterococcus faecalis   - 3/28/25 Blood Cx: 3/4 bottles positive for Enterococcus faecalis   - 3/29/25-3/30/25 Blood Cx: No growth      - 3/29/25 TTE without vegetation seen   - 4/1/25 SHAHNAZ without concern for mass or vegetation     - ID consulted and signed off 4/15/25               - Continue IV vancomycin                            - Plan to complete 6 week course with IV vancomycin - last dose 5/10/25               - Nephrology to place orders for Vancomycin to be given at dialysis upon discharge.                - Will need blood cultures repeated one week after completing IV antibiotics                - Will arrange outpatient follow-up with ID in 2-3 weeks once patient discharges      Abdominal Pain: Improved/Resolved  Retching, with Hx of GERD   Dysphagia s/p PEG Tube (2/1/2025)   Constipation, Rectal Stool Ball   Pt presented to the ED with vague complaints of retching/dry heaving without vomiting and some crampy lower abdominal pain. CT scan showed large rectal stool ball without evidence of obstruction. There was also some non-specific mild urinary bladder wall thickening. No other intra-abdominal pathology.   - Unclear exactly what is going on. Perhaps she has some reflux of tube feeds or other esophageal issue.  - GI consulted                - EGD on 3/30 without etiology to explain dysphagia               - XR Esophagram normal  - Continue Protonix 40 mg daily  - Continue bowel regimen to work on the constipation   - Zofran available PRN  - Will continue to monitor. No additional work-up needed currently.      ESRD on HD   Dialyzes MWF via L arm fistula. Per prior notes, volume removal has often been limited by intra dialytic hypotension  and a fib with RVR. Noted that patient has been getting short of breath on Sundays in the past and there was discussion of adding a fourth dialysis day on Saturdays. As of now, nephrology challenging dry weight to see if that helps.  - Nephrology consulted and following   - Continue PTA midodrine MWF before dialysis   - Continue PTA Vit D.   - Nephrology stopped Renvela      HFpEF  Paroxysmal Atrial Fibrillation on Chronic Anticoagulation  Hypertension  Hyperlipidemia  Troponin elevation, Chronic Non-ischemic Myocardial Injury due to chronic heart failure with preserved ejection fraction and end-stage renal disease (85->85), no treatment indicated   On admission now, pt has markedly elevated BNP to 51,544 (though historically is in the 20,000-40,000 range), She does have small pleural effusions on CT, but no other overt evidence of volume overload or clinical HF.  *TTE 1/21/2025 with LVEF of 60%.  No valvular abnormality   * RHC 3/5/25 showing moderately elevated right and left-sided filling pressure; elevated wedge pressure consistent with pulmonary hypertension     - I/Os and daily weights ordered     - Continue PTA apixaban 5 mg BID  - Continue PTA atorvastatin 20 mg daily  - Continue PTA amiodarone 200 mg change to daily as per discussion with [facility].   - Continue PTA amlodipine 10 m qday  - Increase PTA hydralazine to 100 mg q8H given high blood pressure     - Volume management per Nephrology     Vaginal Bleeding   Pt has had intermittent vaginal bleeding this stay. Nursing and pt quite convinced it is in fact vaginal rather than rectal or urethral.   - Transvaginal ultrasound ordered for further eval negative for any concerning findings.   - Recommend outpatient Gyn follow-up      Anxiety/Depression/Insomnia  - Continue PTA zoloft      Anemia of Chronic Disease  Hemoglobin 9.2 on admission, has trended down since admission to 7.8.   - Hgb 7.7 > 7.6 > 8.7 > 9.3 > 8.6 > 9.3                - S/p 1U pRBC on 4/9/25  -  "Continue to monitor      Type II DM  - Increase to Lantus 20U BID given higher sugars while on Prednisone                - Plan to discharge on home dose   - Continue HDSSI     Physical deconditioning from medical illness, senile frailty.  S/p left above-knee amputation, traumatic  Patient from TCU, daughter hopeful pt will be able to discharge back home with services rather than going back to TCU.   - PT/OT consulted   - Will plan for home discharge      Sacrococcygeal Pressure injury, Stage 3, present on admission  Wound care team followed.  Pressure injury prevention.      Obesity with a BMI of 34.  Increase all-cause mortality and morbidity.    Clinically Significant Risk Factors         # Hyponatremia: Lowest Na = 131 mmol/L in last 2 days, will monitor as appropriate  # Hypochloremia: Lowest Cl = 91 mmol/L in last 2 days, will monitor as appropriate   # Hypercalcemia: Highest Ca = 11.1 mg/dL in last 2 days, will monitor as appropriate    # Hypoalbuminemia: Lowest albumin = 3.1 g/dL at 4/7/2025  6:41 AM, will monitor as appropriate     # Hypertension: Noted on problem list  # Chronic heart failure with preserved ejection fraction: heart failure noted on problem list and last echo with EF >50%          # DMII: A1C = 6.5 % (Ref range: <5.7 %) within past 6 months   # Overweight: Estimated body mass index is 29.75 kg/m  as calculated from the following:    Height as of this encounter: 1.676 m (5' 6\").    Weight as of this encounter: 83.6 kg (184 lb 4.9 oz).   # Moderate Malnutrition: based on nutrition assessment and treatment provided per dietitian's recommendations.    # Financial/Environmental Concerns:             Diet: Renal Diet (dialysis)  Adult Formula Drip Feeding: [place].in Renal Support; Gastrostomy; Goal Rate: 80; mL/hr; From: 6:00 PM; To: 6:00 AM; 4/13: Increase TF rate to 80 ml/hr x 12 hrs.     DVT Prophylaxis: DOAC   Bradshaw Catheter: Not present  Lines: None     Cardiac Monitoring: " None  Code Status: No CPR- Pre-arrest intubation OK      Disposition Plan       Expected Discharge Date: 04/17/2025,  3:00 PM    Destination: home with family;inpatient rehabilitation facility  Discharge Comments: From [facility]  SW following,   IV abx plan per ID      Entered: Debbie Zarco MD 04/16/2025, 2:25 PM     Notes Reviewed: Spoke to Pulm    Family Updated: Spoke to Caroline Gray via phone on 4/16/25    Care Team Updated: Nursing and CM updated     Disposition: Reaching medical stability, patient is a complex discharge and will need make sure daughter has everything she needs prior to discharge.       Medically Ready for Discharge: Anticipated Tomorrow        Debbie Zarco MD  Hospitalist Service   [facility]  Securely message with the Vocera Web Console (learn more here)         Medical Decision Making       50 MINUTES SPENT BY ME on the date of service doing chart review, history, exam, documentation & further activities per the note.           Interval History     No acute overnight events.     This morning the patient states that she is doing well. SOB has improved. Hoping to go home soon.      -Data reviewed today: I reviewed all new labs and imaging results over the last 24 hours.     Physical Exam   Temp: 98.2  F (36.8  C) Temp src: Oral BP: 89/68 Pulse: 69   Resp: 26 SpO2: 99 % O2 Device: None (Room air)    Vitals:    04/14/25 0458 04/14/25 1558 04/16/25 0700   Weight: 87.9 kg (193 lb 12.6 oz) 82.7 kg (182 lb 5.1 oz) 83.6 kg (184 lb 4.9 oz)     Vital Signs with Ranges  Temp:  [98.2  F (36.8  C)-98.6  F (37  C)] 98.2  F (36.8  C)  Pulse:  [62-78] 69  Resp:  [15-29] 26  BP: ()/(41-68) 89/68  SpO2:  [94 %-100 %] 99 %  I/O last 3 completed shifts:  In: 1450 [P.O.:390; NG/GT:1060]  Out: -       Constitutional: Awake, alert, cooperative, no apparent distress.    HEENT: PERRL, Normocephalic, without obvious abnormality, atraumatic, oral pharynx with moist mucus  membranes  Pulmonary: Diminished lung sounds in the bases, coarse sounding cough  Cardiovascular: Regular rate and rhythm, normal S1 and S2  GI: Normal bowel sounds, soft, non-distended, non-tender.  Skin/Integumen: Visualized skin appeared clear.  Neuro: CN II-XII grossly intact.  Psych:  Alert and oriented x 3.  Extremities: No lower extremity edema noted, Left AKA       Medications   Current Facility-Administered Medications   Medication Dose Route Frequency Provider Last Rate Last Admin    dextrose 10% infusion   Intravenous Continuous PRN Ekaterina Gayle MD        heparin 10,000 units/10 mL infusion (DIALYSIS USE)  500 Units/hr Hemodialysis Machine Continuous Braden Oneill MD 0.5 mL/hr at 04/16/25 1021 500 Units/hr at 04/16/25 1021    May take oral meds with a sip of water, the morning of SHAHNAZ procedure.   Does not apply Continuous PRN Ganesh Lawson MD        Stop Heparin 60 minutes before end of treatment   Does not apply Continuous PRN Braden Oneill MD   Given at 04/16/25 1020     Current Facility-Administered Medications   Medication Dose Route Frequency Provider Last Rate Last Admin    - MEDICATION INSTRUCTIONS for Dialysis Patients -   Does not apply See Admin Instructions Ekaterina Gayle MD        amiodarone (PACERONE) tablet 200 mg  200 mg Oral or Feeding Tube Daily Owen Andrade MD   200 mg at 04/16/25 1326    amLODIPine (NORVASC) tablet 10 mg  10 mg Oral or Feeding Tube Once per day on Sunday Tuesday Thursday Saturday Ekaterina Gayle MD   10 mg at 04/15/25 0853    apixaban ANTICOAGULANT (ELIQUIS) tablet 5 mg  5 mg Oral or Feeding Tube BID Ekaterina Gayle MD   5 mg at 04/16/25 1326    atorvastatin (LIPITOR) tablet 20 mg  20 mg Oral or Feeding Tube QPM Ekaterina Gayle MD   20 mg at 04/15/25 2022    budesonide (PULMICORT) neb solution 0.5 mg  0.5 mg Nebulization BID Eren Mandel MD   0.5 mg at 04/16/25 0735    cetirizine (zyrTEC) tablet 10 mg  10 mg Oral  or Feeding Tube At Bedtime Ekaterina Gayle MD   10 mg at 04/15/25 2022    formoterol (PERFOROMIST) neb solution 20 mcg  20 mcg Nebulization Q12H Eren Mandel MD   20 mcg at 04/16/25 0736    hydrALAZINE (APRESOLINE) tablet 100 mg  100 mg Oral or Feeding Tube Q8H Debbie Zarco MD        insulin aspart (NovoLOG) injection (RAPID ACTING)  1-10 Units Subcutaneous TID  Owen Andrade MD   4 Units at 04/16/25 0823    insulin aspart (NovoLOG) injection (RAPID ACTING)  1-7 Units Subcutaneous At Bedtime Owen Andrade MD   4 Units at 04/15/25 2138    insulin glargine (LANTUS PEN) injection 20 Units  20 Units Subcutaneous BID Debbie Zarco MD   20 Units at 04/16/25 0823    ipratropium - albuterol 0.5 mg/2.5 mg/3 mL (DUONEB) neb solution 3 mL  3 mL Nebulization Q4H WA Owen Andrade MD   3 mL at 04/16/25 0735    midodrine (PROAMATINE) tablet 5 mg  5 mg Oral Once per day on Monday Wednesday Friday Ekaterina Gayle MD   5 mg at 04/16/25 0823    pantoprazole (PROTONIX) 2 mg/mL suspension 40 mg  40 mg Per Feeding Tube QAM  Ekaterina Gayle MD   40 mg at 04/16/25 1328    polyethylene glycol (MIRALAX) Packet 17 g  17 g Oral or Feeding Tube BID Ekaterina Gayle MD   17 g at 04/13/25 2051    sennosides (SENOKOT) tablet 2 tablet  2 tablet Oral BID Blaise Holden MD   2 tablet at 04/15/25 0853    sertraline (ZOLOFT) tablet 75 mg  75 mg Oral or Feeding Tube Daily Ekaterina Gayle MD   75 mg at 04/16/25 1327    sevelamer carbonate (RENVELA) tablet 800 mg  800 mg Oral TID w/meals Braden Oneill MD   800 mg at 04/16/25 1326    sodium chloride (PF) 0.9% PF flush 3 mL  3 mL Intravenous Q8H Ganesh Lawson MD   3 mL at 04/15/25 0139    vancomycin (VANCOCIN) 500 mg vial to attach to  mL bag  500 mg Intravenous Once Ekaterina Gayle MD        vancomycin place cardoso - receiving intermittent dosing  1 each Intravenous See Admin Instructions Ekaterina Gayle MD            Data   Recent Labs   Lab 04/16/25  1319 04/16/25  0816 04/16/25  0654 04/15/25  2136 04/15/25  1915 04/14/25  1320 04/14/25  0807 04/14/25  0736 04/14/25  0638 04/13/25  0825 04/13/25  0540 04/12/25  1251 04/12/25  1002 04/11/25  0737 04/11/25  0517 04/10/25  1716 04/10/25  1616   WBC  --   --   --   --   --   --   --   --   --   --  5.8  --  5.8  --  6.9  --   --    HGB  --   --  9.3*  --  9.8*  --  8.6*  --   --   --  9.2*  --  9.3*  --  8.3*  --   --    MCV  --   --   --   --   --   --   --   --   --   --  90  --  91  --  92  --   --    PLT  --   --   --   --   --   --   --   --   --   --  255  --  239  --  216  --   --    NA  --   --  134*  --  131*  --   --   --  126*  --  130*  --  134*  --  134*  --   --    POTASSIUM  --   --  4.4  --  4.6  --   --   --  4.4  --  4.3  --  4.1  --  3.8  --   --    CHLORIDE  --   --  93*  --  91*  --   --   --  87*  --  90*  --  94*  --  93*  --   --    CO2  --   --  29  --  27  --   --   --  29  --  28  --  29  --  32*  --   --    BUN  --   --  76.5*  --  58.5*  --   --   --  82.4*  --  55.3*  --  35.9*  --  58.8*  --   --    CR  --   --  4.34*  --  3.64*  --   --   --  5.15*  --  3.93*  --  2.73*  --  3.90*  --   --    ANIONGAP  --   --  12  --  13  --   --   --  10  --  12  --  11  --  9  --   --    JODY  --   --  10.9*  --  11.1*  --   --   --  10.7*  --  11.0*  --  10.9*  --  10.4  --   --    * 225* 299*   < > 191*   < >  --    < > 267*   < > 270*   < > 164*   < > 170*   < >  --    ALBUMIN  --   --  3.5  --   --   --   --   --   --   --   --   --   --   --   --   --   --    PROTTOTAL  --   --   --   --   --   --   --   --   --   --   --   --   --   --   --   --  6.0*    < > = values in this interval not displayed.       No results found for this or any previous visit (from the past 24 hours).

## 2025-04-16 NOTE — PLAN OF CARE
Goal Outcome Evaluation:       Summary: Parainfluenza, respiratory failure-Dialysis (MWF), abdominal pain, Sepsis, CHF     DATE & TIME: 4/16/25 [id number]    Cognitive Concerns/ Orientation: A&O x 4, forgetful   BEHAVIOR & AGGRESSION TOOL COLOR: Green   CIWA SCORE: NA   ABNL VS/O2: BP soft after dialysis- Hydralazine held,  BP improved in evening, on RA throughout day with saturations in 90's.  MOBILITY: Total lift, left AKA. Turn and repositioning every 2 hours.  PAIN MANAGMENT: Denied  DIET: Renal, TF 80 ml/ hr (6 pm-6 am), takes pills one at a time, whole with water- sometimes patient asks for pills to be given in G tube.   BOWEL/BLADDER: Incontinent of bowels x2, no urine output  ABNL LAB/BG: , 112, 234, CR 4.34, Hemoglobin 9.3  DRAIN/DEVICES: Left fistula, right PIV for Vancomycin due at 2000.  TELEMETRY RHYTHM: NA  SKIN: Blanchable redness on coccyx with excoriation, mepilex in place and changed   TESTS/PROCEDURES: Dialysis today   D/C DAY/GOALS/PLACE: To home with daughter- when all equipment coordinated    OTHER IMPORTANT INFO: Lungs coarse/ diminished,  productive cough. On scheduled Nebs. Pulmonary consulted  ID signed off. Patient will need 6 weeks of Vancomycin with Dialysis per ID.Contact/Droplet precautions maintained.

## 2025-04-16 NOTE — PROGRESS NOTES
Assessment and Plan:     ESRD: Low dose heparin. Midodrine pre-run. 3.5 h, LAF with 15 ga needles and 400 BFR. 3L UF. 3K 33 HCO3 and 140 NA.     Will need to check arrangements for outpt dialysis.     Also on vit D. With high Ca will stop. On Renvela. Phos 2.1. Will decrease dose.             Interval History:   Enterococcus bacteremia: Cx now neg. UC + enterococcus. On IV vanco after dialysis. ID wants IV vanco on dialysis for 6 weeks. Last day of vanco will be 5/10/25.     Anemia: Hgb 9.3 today. EPO on dialysis.      Hypertension:  amlodipine.             Review of Systems:   No sx on dialysis. Tolerating well.           Medications:     Current Facility-Administered Medications   Medication Dose Route Frequency Provider Last Rate Last Admin    - MEDICATION INSTRUCTIONS for Dialysis Patients -   Does not apply See Admin Instructions Ekaterina Gayle MD        amiodarone (PACERONE) tablet 200 mg  200 mg Oral or Feeding Tube Daily Owen Andrade MD   200 mg at 04/15/25 0853    amLODIPine (NORVASC) tablet 10 mg  10 mg Oral or Feeding Tube Once per day on Sunday Tuesday Thursday Saturday Ekaterina Gayle MD   10 mg at 04/15/25 0853    apixaban ANTICOAGULANT (ELIQUIS) tablet 5 mg  5 mg Oral or Feeding Tube BID Ekaterina Gayle MD   5 mg at 04/15/25 2022    atorvastatin (LIPITOR) tablet 20 mg  20 mg Oral or Feeding Tube QPM Ekaterina Gayle MD   20 mg at 04/15/25 2022    budesonide (PULMICORT) neb solution 0.5 mg  0.5 mg Nebulization BID Eren Mandel MD   0.5 mg at 04/16/25 0735    cetirizine (zyrTEC) tablet 10 mg  10 mg Oral or Feeding Tube At Bedtime Ekaterina Gayle MD   10 mg at 04/15/25 2022    epoetin candy-epbx (RETACRIT) injection 4,000 Units  4,000 Units Intravenous Once in dialysis/CRRT Braden Oneill MD        formoterol (PERFOROMIST) neb solution 20 mcg  20 mcg Nebulization Q12H Eren Mandel MD   20 mcg at 04/16/25 0736    heparin (porcine) injection  500 Units  Hemodialysis Machine OR IV Push Once in dialysis/CRRT Braden Oneill MD        hydrALAZINE (APRESOLINE) tablet 100 mg  100 mg Oral or Feeding Tube Q8H Debbie Zarco MD        insulin aspart (NovoLOG) injection (RAPID ACTING)  1-10 Units Subcutaneous TID AC Owen Andrade MD   4 Units at 04/16/25 0823    insulin aspart (NovoLOG) injection (RAPID ACTING)  1-7 Units Subcutaneous At Bedtime Owen Andrade MD   4 Units at 04/15/25 2138    insulin glargine (LANTUS PEN) injection 20 Units  20 Units Subcutaneous BID Debbie Zarco MD   20 Units at 04/16/25 0823    ipratropium - albuterol 0.5 mg/2.5 mg/3 mL (DUONEB) neb solution 3 mL  3 mL Nebulization Q4H WA Owen Andrade MD   3 mL at 04/16/25 0735    midodrine (PROAMATINE) tablet 5 mg  5 mg Oral Once per day on Monday Wednesday Friday Ekaterina Gayle MD   5 mg at 04/16/25 0823    pantoprazole (PROTONIX) 2 mg/mL suspension 40 mg  40 mg Per Feeding Tube QAM  Ekaterina Gayle MD   40 mg at 04/15/25 0851    polyethylene glycol (MIRALAX) Packet 17 g  17 g Oral or Feeding Tube BID Ekaterina Gayle MD   17 g at 04/13/25 2051    predniSONE (DELTASONE) tablet 20 mg  20 mg Oral Daily Debbie Zarco MD   20 mg at 04/15/25 0853    sennosides (SENOKOT) tablet 2 tablet  2 tablet Oral BID Blaise Holden MD   2 tablet at 04/15/25 0853    sertraline (ZOLOFT) tablet 75 mg  75 mg Oral or Feeding Tube Daily Ekaterina Gayle MD   75 mg at 04/15/25 0853    sevelamer carbonate (RENVELA) tablet 1,600 mg  1,600 mg Oral TID w/meals Ekaterina Gayle MD   1,600 mg at 04/15/25 1751    sodium chloride (PF) 0.9% PF flush 3 mL  3 mL Intravenous Q8H Ganesh Lawson MD   3 mL at 04/15/25 0139    sodium chloride 0.9% BOLUS 200 mL  200 mL Hemodialysis Machine Once Braden Oneill MD        sodium chloride 0.9% BOLUS 250 mL  250 mL Intravenous Once in dialysis/CRRT Braden Oneill MD        sodium chloride 0.9% BOLUS 500  mL  500 mL Hemodialysis Machine Once Braden Oneill MD        vancomycin (VANCOCIN) 500 mg vial to attach to  mL bag  500 mg Intravenous Once Ekaterina Gayle MD        vancomycin place cardoso - receiving intermittent dosing  1 each Intravenous See Admin Instructions Ekaterina Gayle MD        vitamin D3 (CHOLECALCIFEROL) tablet 50 mcg  50 mcg Oral or Feeding Tube Daily Ekaterina Gayle MD   50 mcg at 04/15/25 0853     Current Facility-Administered Medications   Medication Dose Route Frequency Provider Last Rate Last Admin    dextrose 10% infusion   Intravenous Continuous PRN Ekaterina Gayle MD        heparin 10,000 units/10 mL infusion (DIALYSIS USE)  500 Units/hr Hemodialysis Machine Continuous Braden Oneill MD        May take oral meds with a sip of water, the morning of SHAHNAZ procedure.   Does not apply Continuous PRN Ganesh Lawson MD        Stop Heparin 60 minutes before end of treatment   Does not apply Continuous PRN Braden Oneill MD         Current active medications and PTA medications reviewed, see medication list for details.            Physical Exam:   Vitals were reviewed  Patient Vitals for the past 24 hrs:   BP Temp Temp src Pulse Resp SpO2 Weight   25 0945 132/64 -- -- 63 19 100 % --   25 0910 (!) 179/58 -- -- -- -- -- --   25 0818 (!) 160/53 98.5  F (36.9  C) Oral 68 18 96 % --   25 0735 -- -- -- -- -- 99 % --   25 0700 -- -- -- -- -- -- 83.6 kg (184 lb 4.9 oz)   04/15/25 2357 (!) 179/60 98.4  F (36.9  C) Oral 74 20 98 % --   04/15/25 1539 (!) 160/49 98.6  F (37  C) Oral 78 20 94 % --       Temp:  [98.4  F (36.9  C)-98.6  F (37  C)] 98.5  F (36.9  C)  Pulse:  [63-78] 63  Resp:  [18-20] 19  BP: (132-179)/(49-64) 132/64  SpO2:  [94 %-100 %] 100 %    Temperatures:  Current - Temp: 98.5  F (36.9  C); Max - Temp  Av.5  F (36.9  C)  Min: 98.4  F (36.9  C)  Max: 98.6  F (37  C)  Respiration range: Resp  Av.3  Min: 18   Max: 20  Pulse range: Pulse  Av.8  Min: 63  Max: 78  Blood pressure range: Systolic (24hrs), Av , Min:132 , Max:179   ; Diastolic (24hrs), Av, Min:49, Max:64    Pulse oximetry range: SpO2  Av.4 %  Min: 94 %  Max: 100 %    I/O last 3 completed shifts:  In: 1450 [P.O.:390; NG/GT:1060]  Out: -       Intake/Output Summary (Last 24 hours) at 2025 1006  Last data filed at 2025 0630  Gross per 24 hour   Intake 1060 ml   Output --   Net 1060 ml       Alert and responsive  On NC O2  LAF with good bruit, needles in place       Wt Readings from Last 4 Encounters:   25 83.6 kg (184 lb 4.9 oz)   25 92.5 kg (204 lb)   25 92.7 kg (204 lb 5.9 oz)   25 101.8 kg (224 lb 6.9 oz)          Data:          Lab Results   Component Value Date     2025     04/15/2025     2025     2021     2021     2020    Lab Results   Component Value Date    CHLORIDE 93 2025    CHLORIDE 91 04/15/2025    CHLORIDE 87 2025    CHLORIDE 106 2021    CHLORIDE 109 2021    CHLORIDE 110 2021    CHLORIDE 105 2021    CHLORIDE 101 2021    CHLORIDE 106 2020    Lab Results   Component Value Date    BUN 76.5 2025    BUN 58.5 04/15/2025    BUN 82.4 2025    BUN 37 2021    BUN 69 2021    BUN 64 2021    BUN 68 2021    BUN 53 2021    BUN 44 2020      Lab Results   Component Value Date    POTASSIUM 4.4 2025    POTASSIUM 4.6 04/15/2025    POTASSIUM 4.4 2025    POTASSIUM 4.7 2022    POTASSIUM 3.8 2021    POTASSIUM 6.6 2021    POTASSIUM 4.2 2021    POTASSIUM 3.8 2021    POTASSIUM 3.9 2021    Lab Results   Component Value Date    CO2 29 2025    CO2 27 04/15/2025    CO2 29 2025    CO2 31 2021    CO2 23 2021    CO2 24 2021    CO2 23 2021    CO2 22 2021    CO2 29 2020    Lab  Results   Component Value Date    CR 4.34 04/16/2025    CR 3.64 04/15/2025    CR 5.15 04/14/2025    CR 5.98 04/17/2021    CR 4.35 04/16/2021    CR 5.28 04/09/2021        Recent Labs   Lab Test 04/16/25  0654 04/15/25  1915 04/14/25  0807 04/13/25  0540 04/12/25  1002 04/11/25  0517   WBC  --   --   --  5.8 5.8 6.9   HGB 9.3* 9.8* 8.6* 9.2* 9.3* 8.3*   HCT  --   --   --  28.5* 29.2* 26.7*   MCV  --   --   --  90 91 92   PLT  --   --   --  255 239 216     Recent Labs   Lab Test 03/29/25  1139 03/28/25  0320 03/27/25  1135   AST 12 11 16   ALT 14 16 22   ALKPHOS 82 93 92   BILITOTAL 0.3 0.4 0.4       Recent Labs   Lab Test 04/14/25  0638 04/07/25  0641 03/31/25  0731   MAG 2.4* 2.4* 2.3     Recent Labs   Lab Test 04/16/25  0654 04/14/25  0638 04/07/25  0641   PHOS 2.1* 2.9 4.9*     Recent Labs   Lab Test 04/16/25  0654 04/15/25  1915 04/14/25  0638   JODY 10.9* 11.1* 10.7*       Lab Results   Component Value Date    JODY 10.9 (H) 04/16/2025     Lab Results   Component Value Date    WBC 5.8 04/13/2025    HGB 9.3 (L) 04/16/2025    HCT 28.5 (L) 04/13/2025    MCV 90 04/13/2025     04/13/2025     Lab Results   Component Value Date     (L) 04/16/2025    POTASSIUM 4.4 04/16/2025    CHLORIDE 93 (L) 04/16/2025    CO2 29 04/16/2025     (H) 04/16/2025     Lab Results   Component Value Date    BUN 76.5 (H) 04/16/2025    CR 4.34 (H) 04/16/2025     Lab Results   Component Value Date    MAG 2.4 (H) 04/14/2025     Lab Results   Component Value Date    PHOS 2.1 (L) 04/16/2025       Creatinine   Date Value Ref Range Status   04/16/2025 4.34 (H) 0.51 - 0.95 mg/dL Final   04/15/2025 3.64 (H) 0.51 - 0.95 mg/dL Final   04/14/2025 5.15 (H) 0.51 - 0.95 mg/dL Final   04/13/2025 3.93 (H) 0.51 - 0.95 mg/dL Final   04/12/2025 2.73 (H) 0.51 - 0.95 mg/dL Final   04/11/2025 3.90 (H) 0.51 - 0.95 mg/dL Final   04/17/2021 5.98 (H) 0.52 - 1.04 mg/dL Final   04/16/2021 4.35 (H) 0.52 - 1.04 mg/dL Final   04/09/2021 5.28 (H) 0.52 - 1.04  mg/dL Final   11/18/2020 4.23 (H) 0.52 - 1.04 mg/dL Final   11/16/2020 5.08 (H) 0.52 - 1.04 mg/dL Final   09/25/2020 6.87 (H) 0.52 - 1.04 mg/dL Final       Attestation:  I have reviewed today's vital signs, notes, medications, labs and imaging.  Seen on dialysis.      Braden Oneill MD

## 2025-04-16 NOTE — PLAN OF CARE
Summary: Parainfluenza, respiratory failure-Dialysis (MWF), abdominal pain, Sepsis, CHF     DATE & TIME: 4/15/25, 1930 - 0730    Cognitive Concerns/ Orientation: A&O x 3, disoriented to time   BEHAVIOR & AGGRESSION TOOL COLOR: Green   CIWA SCORE: NA   ABNL VS/O2: BP elevated, other VSS on room air. Patient requested for BIPAP to be remove at 0330   MOBILITY: Total lift, left AKA. Refused repositioning overnight. Educated regarding  the importance of repositioning to prevent pressure injury/worsening of pressure injury  PAIN MANAGMENT: Denied  DIET: Renal, TF 80 ml/ hr (6 pm-6 am), takes pills one at a time, whole with water   BOWEL/BLADDER: Incontinent of bowels  ABNL LAB/BG: , 309  DRAIN/DEVICES: Left fistula  TELEMETRY RHYTHM: NA  SKIN: Blanchable redness on coccyx with excoriation, mepilex in place  TESTS/PROCEDURES: None  D/C DAY/GOALS/PLACE: Pending improvement,  to home with shellugher   OTHER IMPORTANT INFO: Lungs coarse,  productive cough. On scheduled Nebs. Pulmonary consulted  ID signed off. Patient will need 6 weeks of Vancomycin with Dialysis per ID.Contact/Droplet precautions maintained        Goal Outcome Evaluation:      Plan of Care Reviewed With: patient    Overall Patient Progress: improvingOverall Patient Progress: improving

## 2025-04-17 ENCOUNTER — HOME CARE VISIT (OUTPATIENT)
Dept: HOME HEALTH SERVICES | Facility: HOME HEALTH | Age: 75
End: 2025-04-17
Payer: COMMERCIAL

## 2025-04-17 ENCOUNTER — HOME INFUSION BILLING (OUTPATIENT)
Dept: HOME HEALTH SERVICES | Facility: HOME HEALTH | Age: 75
End: 2025-04-17
Payer: COMMERCIAL

## 2025-04-17 VITALS
DIASTOLIC BLOOD PRESSURE: 78 MMHG | HEART RATE: 70 BPM | RESPIRATION RATE: 18 BRPM | SYSTOLIC BLOOD PRESSURE: 141 MMHG | OXYGEN SATURATION: 94 % | TEMPERATURE: 97.7 F

## 2025-04-17 VITALS
RESPIRATION RATE: 20 BRPM | HEART RATE: 70 BPM | HEIGHT: 66 IN | DIASTOLIC BLOOD PRESSURE: 44 MMHG | SYSTOLIC BLOOD PRESSURE: 154 MMHG | WEIGHT: 184.3 LBS | BODY MASS INDEX: 29.62 KG/M2 | TEMPERATURE: 98.8 F | OXYGEN SATURATION: 98 %

## 2025-04-17 LAB
ALBUMIN SERPL BCG-MCNC: 3.7 G/DL (ref 3.5–5.2)
ANION GAP SERPL CALCULATED.3IONS-SCNC: 12 MMOL/L (ref 7–15)
BUN SERPL-MCNC: 52.8 MG/DL (ref 8–23)
CALCIUM SERPL-MCNC: 11.1 MG/DL (ref 8.8–10.4)
CHLORIDE SERPL-SCNC: 97 MMOL/L (ref 98–107)
CREAT SERPL-MCNC: 3.11 MG/DL (ref 0.51–0.95)
EGFRCR SERPLBLD CKD-EPI 2021: 15 ML/MIN/1.73M2
GLUCOSE BLDC GLUCOMTR-MCNC: 119 MG/DL (ref 70–99)
GLUCOSE BLDC GLUCOMTR-MCNC: 289 MG/DL (ref 70–99)
GLUCOSE BLDC GLUCOMTR-MCNC: 295 MG/DL (ref 70–99)
GLUCOSE SERPL-MCNC: 121 MG/DL (ref 70–99)
HCO3 SERPL-SCNC: 29 MMOL/L (ref 22–29)
PHOSPHATE SERPL-MCNC: 1.9 MG/DL (ref 2.5–4.5)
POTASSIUM SERPL-SCNC: 4.2 MMOL/L (ref 3.4–5.3)
SODIUM SERPL-SCNC: 138 MMOL/L (ref 135–145)

## 2025-04-17 PROCEDURE — B4154 EF SPEC METABOLIC NONINHERIT: HCPCS

## 2025-04-17 PROCEDURE — G0463 HOSPITAL OUTPT CLINIC VISIT: HCPCS

## 2025-04-17 PROCEDURE — S9342 HIT ENTERAL PUMP DIEM: HCPCS

## 2025-04-17 PROCEDURE — 999N000157 HC STATISTIC RCP TIME EA 10 MIN

## 2025-04-17 PROCEDURE — 99239 HOSP IP/OBS DSCHRG MGMT >30: CPT | Performed by: STUDENT IN AN ORGANIZED HEALTH CARE EDUCATION/TRAINING PROGRAM

## 2025-04-17 PROCEDURE — 250N000013 HC RX MED GY IP 250 OP 250 PS 637: Performed by: INTERNAL MEDICINE

## 2025-04-17 PROCEDURE — 82310 ASSAY OF CALCIUM: CPT | Performed by: STUDENT IN AN ORGANIZED HEALTH CARE EDUCATION/TRAINING PROGRAM

## 2025-04-17 PROCEDURE — 99207 PR NO BILLABLE SERVICE THIS VISIT: CPT | Performed by: STUDENT IN AN ORGANIZED HEALTH CARE EDUCATION/TRAINING PROGRAM

## 2025-04-17 PROCEDURE — 94640 AIRWAY INHALATION TREATMENT: CPT | Mod: 76

## 2025-04-17 PROCEDURE — 250N000009 HC RX 250: Performed by: INTERNAL MEDICINE

## 2025-04-17 PROCEDURE — B9002 ENTER NUTR INF PUMP ANY TYPE: HCPCS | Mod: RR

## 2025-04-17 PROCEDURE — 250N000013 HC RX MED GY IP 250 OP 250 PS 637: Performed by: STUDENT IN AN ORGANIZED HEALTH CARE EDUCATION/TRAINING PROGRAM

## 2025-04-17 PROCEDURE — A4213 20+ CC SYRINGE ONLY: HCPCS

## 2025-04-17 PROCEDURE — 36415 COLL VENOUS BLD VENIPUNCTURE: CPT | Performed by: STUDENT IN AN ORGANIZED HEALTH CARE EDUCATION/TRAINING PROGRAM

## 2025-04-17 PROCEDURE — A4450 NON-WATERPROOF TAPE: HCPCS

## 2025-04-17 PROCEDURE — 94660 CPAP INITIATION&MGMT: CPT

## 2025-04-17 PROCEDURE — 94640 AIRWAY INHALATION TREATMENT: CPT

## 2025-04-17 RX ORDER — BUDESONIDE 0.5 MG/2ML
0.5 INHALANT ORAL 2 TIMES DAILY
Qty: 120 ML | Refills: 0 | Status: SHIPPED | OUTPATIENT
Start: 2025-04-17 | End: 2025-04-29

## 2025-04-17 RX ORDER — AMLODIPINE BESYLATE 10 MG/1
10 TABLET ORAL DAILY
Qty: 30 TABLET | Refills: 0 | Status: SHIPPED | OUTPATIENT
Start: 2025-04-17 | End: 2025-04-29

## 2025-04-17 RX ORDER — HYDRALAZINE HYDROCHLORIDE 100 MG/1
100 TABLET, FILM COATED ORAL EVERY 8 HOURS
Qty: 90 TABLET | Refills: 0 | Status: SHIPPED | OUTPATIENT
Start: 2025-04-17 | End: 2025-04-29

## 2025-04-17 RX ORDER — GLUCAGON 3 MG/1
3 POWDER NASAL SEE ADMIN INSTRUCTIONS
Qty: 1 EACH | Refills: 3 | Status: SHIPPED | OUTPATIENT
Start: 2025-04-17

## 2025-04-17 RX ORDER — ALBUTEROL SULFATE 90 UG/1
2 INHALANT RESPIRATORY (INHALATION) EVERY 6 HOURS PRN
Qty: 8.5 G | Refills: 0 | Status: SHIPPED | OUTPATIENT
Start: 2025-04-17

## 2025-04-17 RX ORDER — DOXYCYCLINE 100 MG/1
100 CAPSULE ORAL EVERY 12 HOURS SCHEDULED
Status: DISCONTINUED | OUTPATIENT
Start: 2025-04-17 | End: 2025-04-17 | Stop reason: HOSPADM

## 2025-04-17 RX ORDER — MIDODRINE HYDROCHLORIDE 5 MG/1
5 TABLET ORAL
Qty: 12 TABLET | Refills: 0 | Status: SHIPPED | OUTPATIENT
Start: 2025-04-18 | End: 2025-05-18

## 2025-04-17 RX ORDER — ACETAMINOPHEN 325 MG/1
650 TABLET ORAL EVERY 4 HOURS PRN
Qty: 50 TABLET | Refills: 0 | Status: SHIPPED | OUTPATIENT
Start: 2025-04-17

## 2025-04-17 RX ORDER — CETIRIZINE HYDROCHLORIDE 10 MG/1
10 TABLET ORAL AT BEDTIME
Qty: 30 TABLET | Refills: 0 | Status: SHIPPED | OUTPATIENT
Start: 2025-04-17 | End: 2025-05-17

## 2025-04-17 RX ORDER — DOXYCYCLINE 100 MG/1
100 CAPSULE ORAL EVERY 12 HOURS
Qty: 28 CAPSULE | Refills: 0 | Status: SHIPPED | OUTPATIENT
Start: 2025-04-17 | End: 2025-05-01

## 2025-04-17 RX ORDER — GUAIFENESIN AND DEXTROMETHORPHAN HYDROBROMIDE 600; 30 MG/1; MG/1
1 TABLET, EXTENDED RELEASE ORAL 2 TIMES DAILY PRN
Qty: 10 TABLET | Refills: 0 | Status: SHIPPED | OUTPATIENT
Start: 2025-04-17

## 2025-04-17 RX ORDER — ONDANSETRON 4 MG/1
4 TABLET, ORALLY DISINTEGRATING ORAL EVERY 6 HOURS PRN
Qty: 30 TABLET | Refills: 0 | Status: SHIPPED | OUTPATIENT
Start: 2025-04-17 | End: 2025-04-29

## 2025-04-17 RX ORDER — CHOLECALCIFEROL (VITAMIN D3) 50 MCG
1 TABLET ORAL DAILY
Qty: 30 TABLET | Refills: 0 | Status: SHIPPED | OUTPATIENT
Start: 2025-04-17 | End: 2025-05-17

## 2025-04-17 RX ORDER — SERTRALINE HYDROCHLORIDE 25 MG/1
75 TABLET, FILM COATED ORAL DAILY
Qty: 90 TABLET | Refills: 0 | Status: SHIPPED | OUTPATIENT
Start: 2025-04-17 | End: 2025-04-29

## 2025-04-17 RX ORDER — SEVELAMER CARBONATE 800 MG/1
800 TABLET, FILM COATED ORAL
Qty: 90 TABLET | Refills: 0 | Status: SHIPPED | OUTPATIENT
Start: 2025-04-17 | End: 2025-05-17

## 2025-04-17 RX ORDER — AMIODARONE HYDROCHLORIDE 200 MG/1
200 TABLET ORAL DAILY
Qty: 30 TABLET | Refills: 0 | Status: SHIPPED | OUTPATIENT
Start: 2025-04-17 | End: 2025-04-29

## 2025-04-17 RX ORDER — FORMOTEROL FUMARATE 20 UG/2ML
20 SOLUTION RESPIRATORY (INHALATION) EVERY 12 HOURS
Qty: 360 ML | Refills: 0 | Status: SHIPPED | OUTPATIENT
Start: 2025-04-17 | End: 2025-04-29

## 2025-04-17 RX ORDER — IPRATROPIUM BROMIDE AND ALBUTEROL SULFATE 2.5; .5 MG/3ML; MG/3ML
3 SOLUTION RESPIRATORY (INHALATION) EVERY 4 HOURS PRN
Qty: 90 ML | Refills: 0 | Status: SHIPPED | OUTPATIENT
Start: 2025-04-17

## 2025-04-17 RX ORDER — POLYETHYLENE GLYCOL 3350 17 G/17G
17 POWDER, FOR SOLUTION ORAL DAILY
Qty: 510 G | Refills: 0 | Status: SHIPPED | OUTPATIENT
Start: 2025-04-17

## 2025-04-17 RX ORDER — SENNOSIDES 8.6 MG
2 TABLET ORAL 2 TIMES DAILY
Qty: 60 TABLET | Refills: 0 | Status: SHIPPED | OUTPATIENT
Start: 2025-04-17 | End: 2025-04-29

## 2025-04-17 RX ORDER — ATORVASTATIN CALCIUM 20 MG/1
20 TABLET, FILM COATED ORAL EVERY EVENING
Qty: 30 TABLET | Refills: 0 | Status: SHIPPED | OUTPATIENT
Start: 2025-04-17 | End: 2025-04-29

## 2025-04-17 RX ADMIN — SEVELAMER CARBONATE 800 MG: 800 TABLET, FILM COATED ORAL at 09:10

## 2025-04-17 RX ADMIN — IPRATROPIUM BROMIDE AND ALBUTEROL SULFATE 3 ML: .5; 3 SOLUTION RESPIRATORY (INHALATION) at 15:32

## 2025-04-17 RX ADMIN — FORMOTEROL FUMARATE 20 MCG: 20 SOLUTION RESPIRATORY (INHALATION) at 07:33

## 2025-04-17 RX ADMIN — AMLODIPINE BESYLATE 10 MG: 10 TABLET ORAL at 09:10

## 2025-04-17 RX ADMIN — HYDRALAZINE HYDROCHLORIDE 100 MG: 50 TABLET ORAL at 09:10

## 2025-04-17 RX ADMIN — AMIODARONE HYDROCHLORIDE 200 MG: 200 TABLET ORAL at 09:09

## 2025-04-17 RX ADMIN — SERTRALINE HYDROCHLORIDE 75 MG: 50 TABLET ORAL at 09:09

## 2025-04-17 RX ADMIN — DOXYCYCLINE HYCLATE 100 MG: 100 CAPSULE ORAL at 09:56

## 2025-04-17 RX ADMIN — IPRATROPIUM BROMIDE AND ALBUTEROL SULFATE 3 ML: .5; 3 SOLUTION RESPIRATORY (INHALATION) at 11:43

## 2025-04-17 RX ADMIN — Medication 40 MG: at 09:08

## 2025-04-17 RX ADMIN — APIXABAN 5 MG: 5 TABLET, FILM COATED ORAL at 09:10

## 2025-04-17 RX ADMIN — IPRATROPIUM BROMIDE AND ALBUTEROL SULFATE 3 ML: .5; 3 SOLUTION RESPIRATORY (INHALATION) at 07:33

## 2025-04-17 RX ADMIN — SEVELAMER CARBONATE 800 MG: 800 TABLET, FILM COATED ORAL at 12:36

## 2025-04-17 RX ADMIN — BUDESONIDE 0.5 MG: 0.5 INHALANT RESPIRATORY (INHALATION) at 07:33

## 2025-04-17 ASSESSMENT — ACTIVITIES OF DAILY LIVING (ADL)
ADLS_ACUITY_SCORE: 93

## 2025-04-17 NOTE — CONSULTS
Consult  Care Coordinator is managing patient's discharge planning including arranging her return to dialysis.   CC RN will involve SW if needed.  SW consult is being marked as complete.

## 2025-04-17 NOTE — PLAN OF CARE
Summary: Parainfluenza, respiratory failure-Dialysis (MWF), abdominal pain, Sepsis, CHF     DATE & TIME: 4/16/25, 1930 - 0730    Cognitive Concerns/ Orientation: A&O x 3, disoriented to time. Forgetful   BEHAVIOR & AGGRESSION TOOL COLOR: Green   CIWA SCORE: NA   ABNL VS/O2: BP elevated, other VSS on room air  MOBILITY: Total lift, left AKA. Refused repositioning overnight.  PAIN MANAGMENT: Denied  DIET: Renal, TF 80 ml/ hr (6 pm-6 am), takes pills one at a time, whole with water   BOWEL/BLADDER: Incontinent of bowels x1, no urine output  ABNL LAB/BG: , 295  DRAIN/DEVICES: Left AV  fistula, right PIV  TELEMETRY RHYTHM: NA  SKIN: Blanchable redness on coccyx with excoriation, mepilex in place, CDI TESTS/PROCEDURES: Dialysis today   D/C DAY/GOALS/PLACE: Possible home today,  when all equipment coordinated    OTHER IMPORTANT INFO: Lungs coarse, productive cough. On scheduled Nebs. Pulmonary consulted. Patient will need 6 weeks of Vancomycin with Dialysis per ID. ID signed off. Contact/Droplet precautions maintained    Goal Outcome Evaluation:      Plan of Care Reviewed With: patient    Overall Patient Progress: improvingOverall Patient Progress: improving

## 2025-04-17 NOTE — PROGRESS NOTES
Oxygen Documentation  I certify that this patient, Supriya Herr has been under my care (or a nurse practitioner or physican's assistant working with me). This is the face-to-face encounter for oxygen medical necessity.      At the time of this encounter, I have reviewed the qualifying testing and have determined that supplemental oxygen is reasonable and necessary and is expected to improve the patient's condition in a home setting.         Patient has continued oxygen desaturation due to JONE.    If portability is ordered, is the patient mobile within the home? no    Was this visit performed as a telehealth visit: No    Debbie Zarco MD  Hospitalist Service  [facility]  Securely message with the Vocera Web Console (learn more here)

## 2025-04-17 NOTE — PROGRESS NOTES
Care Management Discharge Note    Discharge Date: 04/17/2025       Discharge Disposition: Home, Home Care, Home Infusion, Dialysis Services, DME    Discharge Services: Home Care, Transportation Services    Discharge DME: Bed, Other (see comment) (Needs nebulizer, lift device with sling, CPCP supplies and new O2 orders)    Discharge Transportation: agency    Private pay costs discussed: Not applicable    Does the patient's insurance plan have a 3 day qualifying hospital stay waiver?  Yes     Which insurance plan 3 day waiver is available? Alternative insurance waiver    Will the waiver be used for post-acute placement? No    PAS Confirmation Code:  NA  Patient/family educated on Medicare website which has current facility and service quality ratings: no    Education Provided on the Discharge Plan: Yes  Persons Notified of Discharge Plans: patient and Daughter Veronica, [place], [place] , [facility] CM, [facility]  Patient/Family in Agreement with the Plan: yes    Handoff Referral Completed: Yes, [facility] PCP: Internal handoff referral completed    Additional Information:    Patient is discharging home  --[facility] ([phone])(Fax:[phone]) conversed with Santino on 4/16.  They are aware of discharge planning for today and would like patient to arrive by 1:50 PM on Friday.  Usual dialysis schedule is MWF at 2:15 PM. Dr Oneill will call in orders and order for Vancomycin for treatment of E. Faecalis bacteremia.      --Patient's [facility] . Ellen Guerrero ([phone]) is aware of the discharge plan and is setting up patient's dialysis transportation and is aware of time.     --[facility] will be managing patient's tube feedings.  The RN Liaison, Celeste Miner has conversed with both patient and Daughter Caroline and will make arrangements as to time of delivery of product and what time the nurse would be at their home later today for teaching.  Celeste noted that they may need a  nurse to go to the home a few days.     --[facility] has accepted patient for SN/PT/OT.  Had conversation with Kelly,  yesterday.  This agency is aware of plan for discharge today and they are following progress on EPIC.  Orders have been faxed to this agency.  They will be calling daughter for scheduling after they receive the discharge order.     --[facility] has delivered the hospital bed and mattress and Rene lift and whole body sling.     --[facility] will deliver the home nebulizer here.  The orders for CPAP supplies were faxed and received.  Patient's daughter was told she will need to go to the [facility] Medical showroom ([facility]) to pick out mask and  this equipment.  They will not deliver to hospital.     Have had conversation this AM with Daughter Caroline.  She will be busy with her other employment until 1 PM and will plan to arrive here at 2 PM to go over the discharge instructions.    This is a large commitment by patient's daughter as she has another job working from home.  Caroline is compensated 7 hours per day for caring for her mother.  This will be a 24/7 caregiver role.     PT order was placed to [facility].  [facility]  PT has not seen patient yet.     Stretcher transportation was set up with transport window ([phone]).    PCS form is completed.  All medications are being filled here due to patient has not been home since February.     Briana Alcocer, RN  Inpatient Care Management  [phone]

## 2025-04-17 NOTE — PROGRESS NOTES
[facility]    Pt will discharge home today on new home tube feeding. Formula and supplies will be delivered to pt's home today between 4-5pm. A nurse visit is scheduled for 745pm. I spoke with pt's daughter, Caroline to update on the above information and she's in agreement with plan.      Thank you for the referral.    Celeste Miner RN  [facility] Liaison  [phone] (Mon thru Fri 8am - 5pm)  [phone] Office

## 2025-04-17 NOTE — PROGRESS NOTES
"[facility] Nurse Inpatient Assessment     Consulted for: \"buttock\"    Summary:  Left buttock wounds are healing well and are mostly epithelialized with a small area of superficial excoriation. Pink scar tissue and chronic tissue discoloration noted to bilateral buttocks and thighs.    [facility] nurse follow-up plan: weekly    Patient History (according to provider note(s):      \"Supriya Herr is a 75 year old female with very complex past medical history including end-stage renal disease on hemodialysis, HFpEF, paroxysmal atrial fibrillation, hypertension, hyperlipidemia, anxiety, depression, chronic anemia, dysphagia status post PEG tube, type 2 diabetes, left traumatic AKA and recent hospitalization with multifactorial respiratory failure now being admitted on 3/27/2025 with abdominal pain and retching. She is found to have e/o constipation. But other evaluation notable for elevated WBC, procal and CRP of unclear etiology.     Pf note, pt with multiple recent hospitalizations. Per last discharge summary:   \"Hospitalized at [facility] from 3/2 - 3/6/2025 with shortness of breath likely multifactorial.  Volume status was difficult to determine, underwent right heart cath on 3/5 showed mildly elevated right atrial and PCWP readings.  Was treated for acute respiratory failure likely from heart failure exacerbation, atrial fibrillation, end-stage renal disease and discharged to care facility with supplemental nocturnal oxygen.\"  \"Prolonged hospitalized at [facility] from 1/8 to 2/26/2025 for acute hypoxic respiratory failure multifactorial, was intubated ( 1/9-1/18), reintubated (1/20 - 1/25).  Then was treated for shock with pressors, influenza A pneumonia, hospital-acquired pneumonia with intravenous antibiotics, steroids, antivirals.  Then also noted to have heart failure exacerbation, recurrent A-fib with RVR, subsequent bradycardia.  Then also noted to have encephalopathy likely from infectious, " "metabolic etiology and delirium.  During that hospitalization was noted to have dysphagia, pharyngeal edema, was evaluated by ENT on 2/16.  No findings to explain dysphagia.  G-tube was placed by IR on 2/17 and was on nocturnal tube feeds.  Discharged to TCU for rehabilitation.\"    Assessment:      Areas visualized during today's visit: Focused:    Wound location:     Last photo: 4-17-25    Wound due to: Friction, Incontinence Associated Dermatitis (IAD), and Intertrigo  Wound history/plan of care: Multiple pink scars noted to buttocks/sacrococcygeal area. Small area of excoriation noted to left buttock. Cleansed with Martir spray and Critic Aid barrier cream applied to protect skin.    Wound base:      Palpation of the wound bed: normal      Drainage: none     Wound bed: Scattered small scabs. Generally area is re-epithelialized.      Tunneling: N/A     Undermining: N/A  Periwound skin: Intact and Scar tissue      Color: normal and consistent with surrounding tissue, dusky, pale, and pink      Temperature: normal   Odor: none  Pain: tenderness   Treatment goal: Heal , Infection control/prevention, Protection, and Simplify plan of care  STATUS follow-up assessment   Supplies ordered: gathered, at bedside, supplies stored on unit, and discussed with patient     WOC will follow-up weekly        Treatment Plan:     Buttocks wound(s): Daily and prn following incontinent episode  Cleanse skin with Martir spray and soft white Austin cloths   Apply Critic Aid barrier paste to protect skin  Follow PIP plan    Pressure Injury Prevention (PIP) Plan:  If patient is declining pressure injury prevention interventions: Explore reason why and address patient's concerns, Educate on pressure injury risk and prevention intervention(s), If patient is still declining, document \"informed refusal\" , and Ensure Care team is aware ( provider, charge nurse, etc)  Mattress: Follow bed algorithm, add Low Air Loss (Air+) mattress pump if skin is " very moist or constantly moist.   HOB: Maintain at or below 30 degrees, unless contraindicated  Repositioning in bed: Every 1-2 hours , Left/right positioning; avoid supine, and Raise foot of bed prior to raising head of bed, to reduce patient sliding down (shear)  Heels: Keep elevated off mattress and Pillows under calves  Protective Dressing: None  Positioning Equipment:None  Chair positioning: Assist patient to reposition hourly and Do NOT use a donut for sitting (this increases pressure to smaller area and creates a higher potential for injury)    If patient has a buttock pressure injury, or high risk for PI use chair cushion or SPS.  Moisture Management: Perineal cleansing /protection: Follow Incontinence Protocol, Avoid brief in bed, Clean and dry skin folds with bathing , and Moisturize dry skin  Under Devices: Inspect skin under all medical devices during skin inspection , Ensure tubes are stabilized without tension, and Ensure patient is not lying on medical devices or equipment when repositioned  Ask provider to discontinue device when no longer needed.       Orders: reviewed     RECOMMEND PRIMARY TEAM ORDER: None, at this time  Education provided: importance of repositioning, plan of care, wound progress, Infection prevention , Moisture management, Hygiene, and Off-loading pressure  Discussed plan of care with: Patient and Nurse  Notify WOC if wound(s) deteriorate.  Nursing to notify the Provider(s) and re-consult the WOC Nurse if new skin concern.    DATA:     Current support surface: Standard  Standard gel mattress (Isoflex)  Containment of urine/stool: Incontinence Protocol and Incontinent pad in bed  BMI: Body mass index is 29.75 kg/m .   Active diet order: Orders Placed This Encounter      Renal Diet (dialysis)      Diet       Output:   I/O last 3 completed shifts:  In: 1196 [P.O.:240; NG/GT:956]  Out: 2600 [Other:2600]     Labs:   Recent Labs   Lab 04/16/25  0654 04/14/25  0807 04/13/25  0540    ALBUMIN 3.5  --   --    HGB 9.3*   < > 9.2*   WBC  --   --  5.8    < > = values in this interval not displayed.     Pressure injury risk assessment:   Sensory Perception: 3-->slightly limited  Moisture: 3-->occasionally moist  Activity: 1-->bedfast  Mobility: 2-->very limited  Nutrition: 2-->probably inadequate  Friction and Shear: 2-->potential problem  Ben Score: 13    Jennifer Carney RN CWCN  Pager no longer is use, please contact through gamigo group: WOC Nurse Eva

## 2025-04-17 NOTE — DISCHARGE INSTRUCTIONS
Nutrition Support Enteral:  Type of Feeding Tube: GT  Enteral Frequency: Cycled (6 pm - 6 am)   Enteral Regimen: [place] Renal @ 80 ml/hr x 12 hours   Total Enteral Provisions: 960 ml, 1728 kcal (26 kcal/kg), 77 g protein (1.2 g/kg), 163 g CHO, 15 g fiber, 643 ml free water daily   Free Water Flush: 60 ml before before and after each feeding.     Buttocks wounds: Daily and following incontinent episodes  Cleanse skin with Martir spray and soft white [place] cloths   Apply Critic Aid barrier paste to protect skin (may use any Zinc based barrier cream/paste)

## 2025-04-17 NOTE — DISCHARGE SUMMARY
"[facility]  Hospitalist Discharge Summary     Admit Date:  3/27/2025  Discharge Date:     4/17/2025  Discharging Provider: Debbie Zarco MD    PRIMARY CARE PROVIDER:    Noam Jackson     DISCHARGE DIAGNOSES:     Acute Respiratory Failure likely 2/2 Parainfluenza Bronchitis: Resolved   Near RML Collapse   3mm Left Upper Pulmonary Nodule   Sepsis 2/2 E. Faecalis Bacteremia   Elevated Procalcitonin   Elevated CRP  Abdominal Pain: Improved/Resolved  Retching, with Hx of GERD   Dysphagia s/p PEG Tube (2/1/2025)   Constipation, Rectal Stool Ball    ESRD on HD   HFpEF  Paroxysmal Atrial Fibrillation on Chronic Anticoagulation  Hypertension  Hyperlipidemia  Troponin elevation, Chronic Non-ischemic Myocardial Injury due to chronic heart failure with preserved ejection fraction and end-stage renal disease (85->85), no treatment indicated   Vaginal Bleeding   Anxiety/Depression/Insomnia   Anemia of Chronic Disease   Type II DM   Physical deconditioning from medical illness, senile frailty.  S/p left above-knee amputation, traumatic  Sacrococcygeal Pressure injury, Stage 3, present on admission   Obesity with a BMI of 34.     BRIEF HISTORY OF PRESENT ILLNESS/HOSPITAL COURSE:   Supriya Herr is a 75 year old female with very complex past medical history including end-stage renal disease on hemodialysis, HFpEF, paroxysmal atrial fibrillation, hypertension, hyperlipidemia, anxiety, depression, chronic anemia, dysphagia status post PEG tube, type 2 diabetes, left traumatic AKA and recent hospitalization with multifactorial respiratory failure who was admitted on 3/27/2025 with abdominal pain and retching.      Pf note, pt with multiple recent hospitalizations.      \"[facility] with shortness of breath likely multifactorial.  Volume status was difficult to determine, underwent right heart cath on 3/5 showed mildly elevated right atrial and PCWP readings.  " "Was treated for acute respiratory failure likely from heart failure exacerbation, atrial fibrillation, end-stage renal disease and discharged to care facility with supplemental nocturnal oxygen.\"     \"Prolonged hospitalized at [facility] from 1/8 to 2/26/2025 for acute hypoxic respiratory failure multifactorial, was intubated ( 1/9-1/18), reintubated (1/20 - 1/25).  Then was treated for shock with pressors, influenza A pneumonia, hospital-acquired pneumonia with intravenous antibiotics, steroids, antivirals.  Then also noted to have heart failure exacerbation, recurrent A-fib with RVR, subsequent bradycardia.  Then also noted to have encephalopathy likely from infectious, metabolic etiology and delirium.  During that hospitalization was noted to have dysphagia, pharyngeal edema, was evaluated by ENT on 2/16.  No findings to explain dysphagia.  G-tube was placed by IR on 2/17 and was on nocturnal tube feeds.  Discharged to TCU for rehabilitation.\"     Discharge Planning   Pt's daughter prefers her to return home rather than TCU. Had multiple extensive conversation with the daughter days leading up to discharge going over the plan and details to make sure she is ready. Discussed that this is a big task to take on as the patient is medical complex and has a lot of needs that take coordination. Caroline Gray stated she understood the task she is taking on and feels comfortable taking it on. Care Mgmt played a big role in helping getting patient home. They also spent time discussing things with Caroline Gray.     - Per Care Mgmt Note:   - [facility] ([phone])(Fax:[phone]) conversed with Santino on 4/16.  They are aware of discharge planning for today and would like patient to arrive by 1:50 PM on Friday.  Usual dialysis schedule is MWF at 2:15 PM. Dr Oneill will call in orders and order for Vancomycin for treatment of E. Faecalis bacteremia.   - Patient's [facility] . Ellen Guerrero ([phone]) is aware " of the discharge plan and is setting up patient's dialysis transportation and is aware of time.   - [facility] will be managing patient's tube feedings.  The RN Liaison, Celeste Miner has conversed with both patient and Daughter Caroline and will make arrangements as to time of delivery of product and what time the nurse would be at their home later today for teaching.  Celeste noted that they may need a nurse to go to the home a few days.   - [facility] has accepted patient for SN/PT/OT.  Had conversation with Kelly,  yesterday.  This agency is aware of plan for discharge today and they are following progress on EPIC.  Orders have been faxed to this agency.  They will be calling daughter for scheduling after they receive the discharge order.   - [facility] has delivered the hospital bed and mattress and Rene lift and whole body sling   - [facility] will deliver the home nebulizer here.  The orders for CPAP supplies were faxed and received.  Patient's daughter was told she will need to go to the [facility] Medical showroom ([facility]) to pick out mask and  this equipment.  They will not deliver to hospital.    - Went over all the patients stay and went over each problem along with meds with Caroline Gray on 4/17/25. All new scripts were given to be taken home.     - Caroline Gray spent time with nursing going over discharge instructions     - This is a high risk discharge in the sense that the patient is medically complex and needs a lot of care. Therapy was recommending TCU and patient came from a TCU but daughter preferred to take patient home despite all of this. As a medical team we have done our best to set her up for success at home. All of the above was discussed with Caroline Gray in detail on the day of discharge. Discussed that it intially will be overwhelming as this is a big change for both of them. Once they have a routine down, things should  improve.      Acute Respiratory Failure likely 2/2 Parainfluenza Bronchitis: Resolved   Near RML Collapse   3mm Left Upper Pulmonary Nodule   From 4/6-4/8/25 had been fairly optimized in general, however despite objectively having good O2 sats and CXR 4/6 unremarkable, she felt SOB at times and had wanted mask or NC in place. Blood gas repeats compensated. CT chest on 4/9/25 showed mild to moderate diffuse bronchial wall thickening, with associated mucus plugging in peripheral subsegmental branches, near complete right middle lobe collapse, new from prior and without obstructing endobronchial lesion identified. On 4/12/25 she was diagnosed with parainfluenza. She was started on prednisone 20 mg daily for bronchial congestion      - Spo2 > 90%                - Wean as able                - Weaned to room air on 4/14/25     - 4/10/25 US guided left thoracentesis produced 150 ml fluid     - Completed prednisone 20mg daily for bronchial congestion     - Change Duonebs to PRN at discharge  - Continue Pulmicort and Perforomist BID at discharge   - Continue flutter valve and IS      - Pulmonary consulted and signed off 4/16/25                - Spoke to Dr. Dalal who spoke to patients daughter on 4/16/25. Answered all of her questions.    - Doxycycline 100mg BID for 2 weeks    - Flutter valve    - Will arrange follow up in clinic with CT in 3 months      Sepsis 2/2 E. Faecalis Bacteremia   Elevated Procalcitonin   Elevated CRP  On admission, WBC elevated to 17.4, Procal elevated to 1.3, and CRP elevated to 39.78.   Unclear infectious source. She has some mild non-specific urinary bladder wall thickening, but with ESRD makes very little urine. Also has a new MIKAYLA 3mm nodule which may be infectious or inflammatory, but no respiratory complaints. COVID, flu RSV negative at admission. UA very abnormal but difficult to interpret in the setting of ESRD. Ultimately Ucx positive for E faecalis. Could be initial source or seeding  from bacteremia.     - 3/27/25 Blood Cx: 4/4 bottles positive for Enterococcus faecalis   - 3/28/25 Blood Cx: 3/4 bottles positive for Enterococcus faecalis   - 3/29/25-3/30/25 Blood Cx: No growth      - 3/29/25 TTE without vegetation seen   - 4/1/25 SHAHNAZ without concern for mass or vegetation     - ID consulted and signed off 4/15/25              - Continue IV vancomycin  - Plan to complete 6 week course with IV vancomycin - last dose 5/10/25   - Nephrology to place orders for Vancomycin to be given at dialysis upon discharge.                - Will need blood cultures repeated one week after completing IV antibiotics                - Will arrange outpatient follow-up with ID in 2-3 weeks once patient discharges      Abdominal Pain: Improved/Resolved  Retching, with Hx of GERD   Dysphagia s/p PEG Tube (2/1/2025)   Constipation, Rectal Stool Ball   Pt presented to the ED with vague complaints of retching/dry heaving without vomiting and some crampy lower abdominal pain. CT scan showed large rectal stool ball without evidence of obstruction. There was also some non-specific mild urinary bladder wall thickening. No other intra-abdominal pathology.   - Unclear exactly what is going on. Perhaps she has some reflux of tube feeds or other esophageal issue.  - GI consulted                - EGD on 3/30 without etiology to explain dysphagia               - XR Esophagram normal  - Continue Protonix 40 mg daily  - Continue bowel regimen to work on the constipation   - Zofran available PRN  - Will continue to monitor. No additional work-up needed currently.      - Nutrition consulted    - TF ordered     ESRD on HD   Dialyzes MWF via L arm fistula. Per prior notes, volume removal has often been limited by intra dialytic hypotension and a fib with RVR. Noted that patient has been getting short of breath on Sundays in the past and there was discussion of adding a fourth dialysis day on Saturdays. As of now, nephrology challenging  dry weight to see if that helps.  - Nephrology consulted and following   - Continue PTA midodrine MWF before dialysis   - Continue PTA Vit D.   - Nephrology stopped Calcitriol and reduced Renvela     HFpEF  Paroxysmal Atrial Fibrillation on Chronic Anticoagulation  Hypertension  Hyperlipidemia  Troponin elevation, Chronic Non-ischemic Myocardial Injury due to chronic heart failure with preserved ejection fraction and end-stage renal disease (85->85), no treatment indicated   On admission now, pt has markedly elevated BNP to 51,544 (though historically is in the 20,000-40,000 range), She does have small pleural effusions on CT, but no other overt evidence of volume overload or clinical HF.  *TTE 1/21/2025 with LVEF of 60%.  No valvular abnormality   * RHC 3/5/25 showing moderately elevated right and left-sided filling pressure; elevated wedge pressure consistent with pulmonary hypertension     - I/Os and daily weights ordered     - Continue PTA apixaban 5 mg BID  - Continue PTA atorvastatin 20 mg daily  - Continue PTA amiodarone 200 mg change to daily as per discussion with Rph.   - Continue PTA amlodipine 10 m qday  - Increase PTA hydralazine to 100 mg q8H given high blood pressure     - Volume management per Nephrology     Vaginal Bleeding   Pt has had intermittent vaginal bleeding this stay. Nursing and pt quite convinced it is in fact vaginal rather than rectal or urethral.   - Transvaginal ultrasound: negative for any concerning findings.   - Recommend outpatient Gyn follow-up      Anxiety/Depression/Insomnia  - Continue PTA Zoloft      Anemia of Chronic Disease  Hemoglobin 9.2 on admission, has trended down since admission to 7.8.   - Hgb 7.7 > 7.6 > 8.7 > 9.3 > 8.6 > 9.3                - S/p 1U pRBC on 4/9/25  - Continue to monitor      Type II DM  - Will resume home Lantus does of 15U BID as patient is done with steroids     Physical deconditioning from medical illness, senile frailty.  S/p left above-knee  "amputation, traumatic  Patient from TCU, daughter hopeful pt will be able to discharge back home with services rather than going back to TCU.   - PT/OT consulted    - TCU recommended   - Will plan for home discharge      Sacrococcygeal Pressure injury, Stage 3, present on admission  Wound care team followed.  Pressure injury prevention.      Obesity with a BMI of 34.  Increase all-cause mortality and morbidity.       Clinically Significant Risk Factors         # Hyponatremia: Lowest Na = 131 mmol/L in last 2 days, will monitor as appropriate  # Hypochloremia: Lowest Cl = 91 mmol/L in last 2 days, will monitor as appropriate   # Hypercalcemia: Highest Ca = 11.1 mg/dL in last 2 days, will monitor as appropriate    # Hypoalbuminemia: Lowest albumin = 3.1 g/dL at 4/7/2025  6:41 AM, will monitor as appropriate     # Hypertension: Noted on problem list    # Chronic heart failure with preserved ejection fraction: heart failure noted on problem list and last echo with EF >50%          # DMII: A1C = 6.5 % (Ref range: <5.7 %) within past 6 months   # Overweight: Estimated body mass index is 29.75 kg/m  as calculated from the following:    Height as of this encounter: 1.676 m (5' 6\").    Weight as of this encounter: 83.6 kg (184 lb 4.9 oz).   # Moderate Malnutrition: based on nutrition assessment and treatment provided per dietitian's recommendations.      # Financial/Environmental Concerns:                TOTAL DISCHARGE TIME:  Debbie QUIÑONEZ MD, personally saw the patient today and spent greater than 30 minutes discharging this patient.    Debbie Zarco MD  [facility]  Hospitalist Service   Securely message with the Vocera Web Console (learn more here)  Text page via [employer] Paging/Directory        Significant Results and Procedures   As above    Pending Results   These results will be followed up by n/a  Unresulted Labs Ordered in the Past 30 Days of this Admission       No orders " found from 2/25/2025 to 3/28/2025.            Code Status   No CPR but Pre-arrest intubation ferny        Primary Care Physician   Noam Jackson    Physical Exam   Temp: 98.8  F (37.1  C) Temp src: Oral BP: (!) 154/44 Pulse: 70   Resp: 20 SpO2: 98 % O2 Device: None (Room air)    Vitals:    04/14/25 0458 04/14/25 1558 04/16/25 0700   Weight: 87.9 kg (193 lb 12.6 oz) 82.7 kg (182 lb 5.1 oz) 83.6 kg (184 lb 4.9 oz)     Vital Signs with Ranges  Temp:  [98  F (36.7  C)-98.8  F (37.1  C)] 98.8  F (37.1  C)  Pulse:  [65-77] 70  Resp:  [15-29] 20  BP: ()/(41-68) 154/44  SpO2:  [96 %-100 %] 98 %  I/O last 3 completed shifts:  In: 1196 [P.O.:240; NG/GT:956]  Out: 2600 [Other:2600]    Constitutional: Awake, alert, cooperative, no apparent distress.    HEENT: PERRL, Normocephalic, without obvious abnormality, atraumatic, oral pharynx with moist mucus membranes  Pulmonary: Diminished lung sounds in the bases, coarse sounding cough  Cardiovascular: Regular rate and rhythm, normal S1 and S2  GI: Normal bowel sounds, soft, non-distended, non-tender.  Skin/Integumen: Visualized skin appeared clear.  Neuro: CN II-XII grossly intact.  Psych:  Alert and oriented x 3.  Extremities: No lower extremity edema noted, Left AKA        Discharge Disposition   Discharged to home  Condition at discharge: Stable    Consultations This Hospital Stay   NUTRITION SERVICES ADULT IP CONSULT  WOUND OSTOMY CONTINENCE NURSE  IP CONSULT  CARE MANAGEMENT / SOCIAL WORK IP CONSULT  NEPHROLOGY IP CONSULT  GASTROENTEROLOGY IP CONSULT  SPEECH LANGUAGE PATH ADULT IP CONSULT  PHYSICAL THERAPY ADULT IP CONSULT  CARE MANAGEMENT / SOCIAL WORK IP CONSULT  PHARMACY IP CONSULT  NUTRITION SERVICES ADULT IP CONSULT  INFECTIOUS DISEASES IP CONSULT  PHARMACY TO DOSE VANCO  WOUND OSTOMY CONTINENCE NURSE  IP CONSULT  CARE MANAGEMENT / SOCIAL WORK IP CONSULT  PALLIATIVE CARE ADULT IP CONSULT  VASCULAR ACCESS ADULT IP CONSULT  VASCULAR ACCESS ADULT IP  CONSULT  PALLIATIVE CARE ADULT IP CONSULT  PULMONARY IP CONSULT  VASCULAR ACCESS ADULT IP CONSULT  PULMONARY IP CONSULT  NUTRITION SERVICES ADULT IP CONSULT  SOCIAL WORK IP CONSULT  VASCULAR ACCESS ADULT IP CONSULT  PHYSICAL THERAPY ADULT IP CONSULT      Discharge Orders      Med Therapy Management Referral      Primary Care - Care Coordination Referral      Home Care Referral      Home Infusion Referral      Reason for your hospital stay    You were admitted to the hospital for abdominal pain. During your stay, it was found that your had sepsis due to enterococcus faecalis bacteremia. The likley source was your urine. Infectious disease saw you and recommended 6 weeks of IV Vancomycin. For your abdominal pain, you underwent an EGD on 3/30/25 which did not show any findings to explain your symptoms. While you were in the hospital, you were diagnosed with parainfluenza, bronchitis and right middle lung collapse. Pulmonology saw you and recommended 2 weeks of oral antibiotics, continued nebulizer and outpatient follow up.     Activity    Your activity upon discharge: activity as tolerated     Tubes and Drains    Current Tubes and Drains:     Drain  Duration           GI Enteral Access Device LUQ Gastrostomy 20 days        Line  Duration           Hemodialysis Vascular Access AV fistula Superior Arm -- days    Hemodialysis Vascular Access Arteriovenous graft Left Forearm 1806 days              CPAP - Continue Home CPAP    Continue Home CPAP per home equipment settings.     Flutter Valve    Continue to use flutter valve 4 times a day For 1 month     Incentive Spirometry    Continue to use incentive spirometer 4 times a day For 1 month     Discharge Instructions    - Use CPAP at night with 2L of oxygen as doing prior to admission     Monitor and record    Blood glucose: 4 times a day, before meals and at bedtime.    Blood pressure: three times a day.    Please write these down and bring them to her primary care  "appointment.     Miscellaneous DME Order    DME Documentation:   Describe the reason for need to support medical necessity: See script faxed    I, the undersigned, certify that the above prescribed supplies are medically necessary for this patient and is both reasonable and necessary in reference to accepted standards of medical and necessary in reference to accepted standards of medical practice in the treatment of this patient's condition and is not prescribed as a convenience.     Hospital Bed    Hospital Bed Documentation:   Hospital bed is required for body positioning, to allow for safe transfers to wheelchair and standing and frequent changes in body position, not feasible in an ordinary bed     NOTE: Patient must have a \"Yes\" in one of the four following questions to qualify for a hospital bed.    1. Does the patient require positioning of the body in ways not feasible with an ordinary bed due to a medical condition that is expected to last at least 1 month? Yes (Please explain): yes  multifactorial respiratory failure and ESRD and CHF    2. Does the patient require, for the alleviation of pain, positioning of the body in ways not feasible with an ordinary bed? Yes .      3. Does the patient require the head of the bed to be elevated more than 30 degrees most of the time due to congestive heart failure, chronic pulmonary disease, or aspiration? Yes (Please explain): h/o CHFand has Tube feeding  due to Dysphagia    4. Does the patient require traction that can only be attached to a hospital bed? Yes  Patient will be able to assist in turning and transfers     Additional Criteria:    Does the patient require frequent changes in body position and/or have an immediate need for change in body position? Yes - Patient qualifies for Semi Electric Bed     Trapeze Criteria:  (Patient must meet standard hospital bed criteria also)   1. Does patient need this device to sit up because of a respiratory condition, for " change in body position for other medical reasons, or to get in or out of bed? Yes (Please explain): CHF Sacrococcygeal Pressure injury, Stage 3, present on admission    I, the undersigned, certify that the above prescribed supplies are medically necessary for this patient and is both reasonable and necessary in reference to accepted standards of medical and necessary in reference to accepted standards of medical practice in the treatment of this patient's condition and is not prescribed as a convenience.     Miscellaneous DME Order    DME Documentation:   Describe the reason for need to support medical necessity: Patient is on GJ tube feeding .     I, the undersigned, certify that the above prescribed supplies are medically necessary for this patient and is both reasonable and necessary in reference to accepted standards of medical and necessary in reference to accepted standards of medical practice in the treatment of this patient's condition and is not prescribed as a convenience.     Miscellaneous DME Order    DME Documentation:   Describe the reason for need to support medical necessity: Htn and CAD.     I, the undersigned, certify that the above prescribed supplies are medically necessary for this patient and is both reasonable and necessary in reference to accepted standards of medical and necessary in reference to accepted standards of medical practice in the treatment of this patient's condition and is not prescribed as a convenience.     Nebulizer and Nebulizer Supplies     Oxygen Adult/Peds     Diet    Follow this diet upon discharge:       Adult Formula Drip Feeding: Continuous [employer] Renal Support; Gastrostomy; Goal Rate: 80; mL/hr; From: 6:00 PM; To: 6:00 AM; 4/13: Increase TF rate to 80 ml/hr x 12 hrs.      Renal Diet (dialysis)    Free water order:  Free water route: Gastric   Free water - Feeding Tube Flush Frequency: Before and after each feeding   Free water volume: 60 mL   Additional Free  water: None     Hospital Follow-up with Existing Primary Care Provider (PCP)          Discharge Medications   Current Discharge Medication List        START taking these medications    Details   budesonide (PULMICORT) 0.5 MG/2ML neb solution Take 2 mLs (0.5 mg) by nebulization 2 times daily.  Qty: 120 mL, Refills: 0    Associated Diagnoses: Parainfluenza; Collapse of right lung      dextromethorphan-guaiFENesin (MUCINEX DM)  MG 12 hr tablet Take 1 tablet by mouth 2 times daily as needed for cough.  Qty: 10 tablet, Refills: 0    Associated Diagnoses: Parainfluenza      doxycycline hyclate (VIBRAMYCIN) 100 MG capsule Take 1 capsule (100 mg) by mouth every 12 hours for 14 days.  Qty: 28 capsule, Refills: 0    Associated Diagnoses: Bronchitis; Mucus plugging of bronchi      formoterol (PERFOROMIST) 20 MCG/2ML neb solution Take 2 mLs (20 mcg) by nebulization every 12 hours.  Qty: 360 mL, Refills: 0    Associated Diagnoses: Collapse of right lung      polyethylene glycol (MIRALAX) 17 GM/Dose powder Take 17 g by mouth daily.  Qty: 510 g, Refills: 0    Associated Diagnoses: Constipation, unspecified constipation type      sennosides (SENOKOT) 8.6 MG tablet Take 2 tablets by mouth 2 times daily.  Qty: 60 tablet, Refills: 0    Associated Diagnoses: Constipation, unspecified constipation type           CONTINUE these medications which have CHANGED    Details   acetaminophen (TYLENOL) 325 MG tablet Take 2 tablets (650 mg) by mouth every 4 hours as needed for mild pain.  Qty: 50 tablet, Refills: 0    Associated Diagnoses: ESRD on dialysis (H)      albuterol (PROAIR HFA/PROVENTIL HFA/VENTOLIN HFA) 108 (90 Base) MCG/ACT inhaler Inhale 2 puffs into the lungs every 6 hours as needed for shortness of breath or wheezing.  Qty: 8.5 g, Refills: 0    Comments: Pharmacy may dispense brand covered by insurance (Proair, or proventil or ventolin or generic albuterol inhaler)  Associated Diagnoses: Parainfluenza      amiodarone  (PACERONE) 200 MG tablet Take 1 tablet (200 mg) by mouth or Feeding Tube daily.  Qty: 30 tablet, Refills: 0    Associated Diagnoses: Paroxysmal atrial fibrillation (H)      amLODIPine (NORVASC) 10 MG tablet Take 1 tablet (10 mg) by mouth or Feeding Tube daily. Take 1 table  by mouth once daily on every Tue, Thu, Sa, Sun  Qty: 30 tablet, Refills: 0    Associated Diagnoses: Hypertension goal BP (blood pressure) < 140/90      apixaban ANTICOAGULANT (ELIQUIS) 5 MG tablet Take 1 tablet (5 mg) by mouth or Feeding Tube 2 times daily.  Qty: 60 tablet, Refills: 0    Associated Diagnoses: Paroxysmal atrial fibrillation (H)      atorvastatin (LIPITOR) 20 MG tablet Take 1 tablet (20 mg) by mouth or Feeding Tube every evening.  Qty: 30 tablet, Refills: 0    Associated Diagnoses: Hypercholesterolemia      cetirizine (ZYRTEC) 10 MG tablet Take 1 tablet (10 mg) by mouth at bedtime.  Qty: 30 tablet, Refills: 0    Associated Diagnoses: Seasonal allergic rhinitis, unspecified trigger      Glucagon (BAQSIMI ONE PACK) 3 MG/DOSE nasal powder Spray 1 spray (3 mg) in nostril See Admin Instructions. USE ONLY FOR SEVERE HYPOGLYCEMIA.  Qty: 1 each, Refills: 3    Associated Diagnoses: Type 2 diabetes mellitus with hyperglycemia, with long-term current use of insulin (H)      hydrALAZINE (APRESOLINE) 100 MG tablet Take 1 tablet (100 mg) by mouth or Feeding Tube every 8 hours.  Qty: 90 tablet, Refills: 0    Associated Diagnoses: Hypertension goal BP (blood pressure) < 140/90      insulin glargine (LANTUS PEN) 100 UNIT/ML pen Inject 15 Units subcutaneously 2 times daily.  Qty: 15 mL, Refills: 0    Comments: If Lantus is not covered by insurance, may substitute Basaglar or Semglee or other insulin glargine product per insurance preference at same dose and frequency.    Associated Diagnoses: Type 2 diabetes, HbA1C goal < 8% (H)      ipratropium - albuterol 0.5 mg/2.5 mg/3 mL (DUONEB) 0.5-2.5 (3) MG/3ML neb solution Take 1 vial (3 mLs) by  nebulization every 4 hours as needed for wheezing or shortness of breath.  Qty: 90 mL, Refills: 0    Associated Diagnoses: Influenza A      miconazole (MICATIN) 2 % external powder Apply topically 2 times daily as needed (redness under breasts/groin). Apply twice daily to skin folds as needed  Qty: 90 g, Refills: 0    Associated Diagnoses: Intertrigo      midodrine (PROAMATINE) 5 MG tablet Take 1 tablet (5 mg) by mouth three times a week. Mon, Wed, and Fri (Prior to dialysis)  Qty: 12 tablet, Refills: 0    Associated Diagnoses: ESRD (end stage renal disease) (H)      ondansetron (ZOFRAN ODT) 4 MG ODT tab Take 1 tablet (4 mg) by mouth every 6 hours as needed for nausea or vomiting.  Qty: 30 tablet, Refills: 0    Associated Diagnoses: Nausea      pantoprazole (PROTONIX) 2 mg/mL SUSP suspension Place 20 mLs (40 mg) into Feeding Tube every morning (before breakfast).  Qty: 600 mL, Refills: 0    Associated Diagnoses: Gastroesophageal reflux disease with esophagitis without hemorrhage      sertraline (ZOLOFT) 25 MG tablet Take 3 tablets (75 mg) by mouth or Feeding Tube daily.  Qty: 90 tablet, Refills: 0    Associated Diagnoses: Episode of recurrent major depressive disorder, unspecified depression episode severity      sevelamer carbonate (RENVELA) 800 MG tablet Take 1 tablet (800 mg) by mouth 3 times daily (with meals).  Qty: 90 tablet, Refills: 0    Associated Diagnoses: ESRD on hemodialysis (H)      Vitamin D3 50 mcg (2000 units) tablet Take 1 tablet (50 mcg) by mouth daily.  Qty: 30 tablet, Refills: 0    Associated Diagnoses: Vitamin D deficiency           CONTINUE these medications which have NOT CHANGED    Details   melatonin 3 MG tablet Take 1 tablet (3 mg) by mouth nightly as needed for sleep.    Associated Diagnoses: Other insomnia           STOP taking these medications       calcitRIOL (ROCALTROL) 0.25 MCG capsule Comments:   Reason for Stopping:         olopatadine (PATANOL) 0.1 % ophthalmic solution Comments:    Reason for Stopping:         tacrolimus (PROTOPIC) 0.1 % external ointment Comments:   Reason for Stopping:         triamcinolone (KENALOG) 0.025 % external ointment Comments:   Reason for Stopping:             Allergies   Allergies   Allergen Reactions    Ampicillin-Sulbactam Sodium Rash     No evidence SJS, but very uncomfortable and precipitated multiple provider visits. Would not use penicillins again if other options available.     Penicillins Rash     Data   Most Recent 3 CBC's:  Recent Labs   Lab Test 04/16/25  0654 04/15/25  1915 04/14/25  0807 04/13/25  0540 04/12/25  1002 04/11/25  0517   WBC  --   --   --  5.8 5.8 6.9   HGB 9.3* 9.8* 8.6* 9.2* 9.3* 8.3*   MCV  --   --   --  90 91 92   PLT  --   --   --  255 239 216      Most Recent 3 BMP's:  Recent Labs   Lab Test 04/17/25  0800 04/17/25  0225 04/16/25 2136 04/16/25  0816 04/16/25  0654 04/15/25  2136 04/15/25  1915 04/14/25  0736 04/14/25  0638   NA  --   --   --   --  134*  --  131*  --  126*   POTASSIUM  --   --   --   --  4.4  --  4.6  --  4.4   CHLORIDE  --   --   --   --  93*  --  91*  --  87*   CO2  --   --   --   --  29  --  27  --  29   BUN  --   --   --   --  76.5*  --  58.5*  --  82.4*   CR  --   --   --   --  4.34*  --  3.64*  --  5.15*   ANIONGAP  --   --   --   --  12  --  13  --  10   JODY  --   --   --   --  10.9*  --  11.1*  --  10.7*   * 295* 385*   < > 299*   < > 191*   < > 267*    < > = values in this interval not displayed.     Most Recent 2 LFT's:  Recent Labs   Lab Test 03/29/25  1139 03/28/25  0320   AST 12 11   ALT 14 16   ALKPHOS 82 93   BILITOTAL 0.3 0.4     Most Recent INR's and Anticoagulation Dosing History:  Anticoagulation Dose History  More data exists         Latest Ref Rng & Units 9/25/2020 11/16/2020 4/9/2021 4/16/2021 4/24/2024 2/14/2025 3/30/2025   Recent Dosing and Labs   INR 0.85 - 1.15 1.14  1.15  1.14  1.14  1.11  1.57  1.47      Most Recent 3 Troponin's:No lab results found.  Most Recent Cholesterol  Panel:  Recent Labs   Lab Test 04/21/23  1405   CHOL 113   LDL 39   HDL 50   TRIG 122     Most Recent 6 Bacteria Isolates From Any Culture (See EPIC Reports for Culture Details):  Recent Labs   Lab Test 03/19/21  0900 06/24/18  1100 06/23/18  2348 06/23/18  2347 06/18/18  0949 05/19/18  0119   CULT Light growth  Normal skin shree    Light growth  Streptococcus agalactiae sero group B  This organism is susceptible to ampicillin, penicillin, vancomycin and the cephalosporins.   If treatment is required AND your patient is allergic to penicillin, contact the   Microbiology Lab within 5 days to request susceptibility testing.  *  Moderate growth  Candida albicans  Susceptibility testing not routinely done  * Moderate growth  Corynebacterium striatum  Identification obtained by MALDI-TOF mass spectrometry research use only database. Test   characteristics determined and verified by the Infect[facility] Diseases Diagnostic Laboratory   ([facility]) [place], [place].  *  Light growth  Staphylococcus aureus  *  Single colony  Beta hemolytic Streptococcus group B  *  Light growth  Gram positive bacilli resembling diphtheroids  No further identification  Susceptibility testing not routinely done  *  Susceptibility testing requested by  Dr. Chen for routine sensitivities on the Corynebacterium striatum on 6.26.18. ME.    Moderate growth  Staphylococcus aureus  Susceptibility testing done on previous specimen  *  Moderate growth  Corynebacterium striatum  Identification obtained by MALDI-TOF mass spectrometry research use only database. Test   characteristics determined and verified by the Infect[facility] Diseases Diagnostic Laboratory   ([facility]) [place], [place].  Susceptibility testing not routinely done  *  Incorrectly ordered by PCU/Clinic  Canceled, Test credited  See Accesion number [id number] No growth No growth Moderate growth  Beta hemolytic Streptococcus group C  *  Moderate growth  Staphylococcus aureus  *  Light growth  Normal  skin shree   No growth     Most Recent TSH, T4 and A1c Labs:  Recent Labs   Lab Test 02/05/25  1736 01/17/25 2039   TSH 5.67*  --    T4 1.32  --    A1C  --  6.5*     Results for orders placed or performed during the hospital encounter of 03/27/25   CT Chest Abdomen Pelvis w/o Contrast    Narrative    EXAM: CT CHEST ABDOMEN PELVIS W/O CONTRAST  LOCATION: [facility]  DATE: 3/27/2025    INDICATION: Right chest pain, right flank pain, frequent retching, g-tube dependent.  COMPARISON: Chest radiograph 3/9/2025. CT chest, abdomen pelvis 2/2/2025.  TECHNIQUE: CT scan of the chest, abdomen, and pelvis was performed without IV contrast. Multiplanar reformats were obtained. Dose reduction techniques were used.   CONTRAST: None.    FINDINGS:   LUNGS AND PLEURA: Resolved right upper lobe consolidative opacity. Small to moderate left and small right pleural effusions with adjacent atelectasis. Similar and likely benign right lower lobe 4 mm nodule (4/156). New left upper lobe 3 mm nodule (4/93).    MEDIASTINUM/AXILLAE: Aortic calcification. No lymphadenopathy. Cardiomegaly. Left axillary vascular stent. Mitral valve annular calcification.    CORONARY ARTERY CALCIFICATION: Severe.    HEPATOBILIARY: Cholelithiasis.    PANCREAS: Normal.    SPLEEN: Normal.    ADRENAL GLANDS: Normal.    KIDNEYS/BLADDER: Atrophic kidneys. Left renal cyst; no follow-up necessary. Nonspecific mild urinary bladder wall thickening.    BOWEL: Large rectal stool ball (rectal diameter measures 8.3 cm. No significant wall thickening or obstruction. Gastrostomy tube. Mild stranding surrounding the gastrostomy catheter along the abdominal wall. No organized fluid collection. Appendix is   normal.    LYMPH NODES: Since 2019, similar mildly enlarged left periaortic lymph nodes measuring up to 1.2 cm (3/164).    VASCULATURE: Severe aortobiiliac atherosclerosis.    PELVIC ORGANS: Apparent prominent cervix/upper vagina is not well assessed  with noncontrast technique. No adnexal mass..    MUSCULOSKELETAL: Right posterior acetabular surgical hardware. Severe right hip joint osteoarthrosis. Remote rib fractures. Degenerative changes of the spine.      Impression    IMPRESSION:  1.  Small to moderate left and small right pleural effusions with adjacent atelectasis.  2.  New left upper lobe 3 mm nodule may be infectious/inflammatory. Attention on follow-up. Resolved previously visualized right upper lobe pneumonia.  3.  Large rectal stool ball. No evidence of obstruction.  4.  Nonspecific mild urinary bladder wall thickening. Correlate with urinalysis to exclude infection.  5.  Cholelithiasis.       XR Esophagram    Narrative    EXAM: XR ESOPHAGRAM SINGLE CONTRAST  LOCATION: [facility]  DATE: 3/31/2025    INDICATION: frequent vomiting, retching, dysphagia  COMPARISON: Esophagram 03/04/2025.  TECHNIQUE: Very limited patient mobility. Examination was performed the complex, in the MARSHALL position. Single contrast barium esophagram.    RADIATION DOSE: Dose Area Product 594.35 microGy-m2    FINDINGS:   ESOPHAGUS: Normal caliber esophagus. No strictures. Mildly delayed mid to lower esophageal clearing secondary to increased tertiary peristalsis likely due to presbyesophagus.         US Pelvic Complete with Transvaginal    Narrative    EXAM: US PELVIC TRANSABDOMINAL AND TRANSVAGINAL  LOCATION: [facility]  DATE: 4/1/2025    INDICATION: abnormal vaginal bleeding  COMPARISON: CT 5/7/2024  TECHNIQUE: Transabdominal scans were performed. Endovaginal ultrasound was performed to better visualize the adnexa.    FINDINGS:    UTERUS: 6.8 x 4.6 x 4.0 cm. Uterus is overall poorly seen due to extensive bowel gas. Some areas were measured on the exam below this appears to be artifact from bowel gas. No convincing fibroids.    ENDOMETRIUM: Endometrium poorly seen and difficult to definitively measure.  RIGHT OVARY: Not seen due  to bowel gas.    LEFT OVARY: Not seen due to bowel gas.  No significant free fluid.      Impression    IMPRESSION:  1.  Very difficult exam due to patient's body habitus and fairly extensive amount of bowel gas.    2.  Neither ovary is clearly seen due to bowel gas.    3.  Endometrium is poorly seen as well. No concerning findings.         XR Chest Port 1 View    Narrative    EXAM: XR CHEST PORT 1 VIEW  LOCATION: [facility]  DATE: 4/5/2025    INDICATION: Cough, shortness of breath  COMPARISON: 3/9/2025 radiograph. 3/27/2025 CT.      Impression    IMPRESSION: No pneumothorax. A left basilar opacity likely represents atelectasis. Underlying airspace disease is also possible. There is interstitial edema. The cardiomediastinal silhouette is at the upper limits of normal in size. Mitral annular   calcification is present. There is a left axillary vascular stent.   XR Chest Port 1 View    Narrative    EXAM: XR CHEST PORT 1 VIEW  LOCATION: [facility]  DATE: 4/6/2025    INDICATION: Worsening SOB  COMPARISON: 4/5/2025      Impression    IMPRESSION: Stable elevated right hemidiaphragm. Mild bibasilar atelectasis. No pleural effusion. Stable enlarged cardiac silhouette. Stable mild pulmonary vascular congestion. Left axillary stent.   XR Chest Port 1 View    Narrative    EXAM: XR CHEST PORT 1 VIEW  LOCATION: [facility]  DATE: 4/9/2025    INDICATION: Hypoxemia.  COMPARISON: 4/6/2025.      Impression    IMPRESSION: Right basilar atelectasis or infiltrate, increased from previous. Pulmonary vascularity is congested with probable pulmonary edema.   CT Chest w/o Contrast     Value    Radiologist flags Chest CT in 3 months.    Narrative    EXAM: CT CHEST W/O CONTRAST  LOCATION: [facility]  DATE: 4/9/2025    INDICATION: Shortness of breath.  COMPARISON: 3/27/2025 and 11/12/2019.  TECHNIQUE: CT chest without IV contrast. Multiplanar  reformats were obtained. Dose reduction techniques were used.  CONTRAST: None.    FINDINGS: Absence of intravenous contrast limits the sensitivity of this examination for detection of infectious/inflammatory change, post traumatic abnormalities, vascular abnormalities, and visceral lesions. Study is additionally degraded by motion.    LUNGS AND PLEURA: Mild to moderate diffuse bronchial wall thickening, with mucus plugging within peripheral subsegmental branches. No pulmonary interstitial edema. Mild to moderate dependent atelectatic change. Near complete right middle lobe collapse,   new from prior and without obstructing endobronchial lesion identified.     A 3 mm nodule in the left upper lobe, series 4 image 142, similar to recent prior, though new from 11/12/2019.    No pneumothorax.    Moderate left and small right pleural effusions.     MEDIASTINUM/AXILLAE: Mild mediastinal lymphadenopathy, similar to recent prior, though new from 2019.      Thoracic esophagus is unremarkable.      No axillary lymphadenopathy.    Chest wall is unremarkable.    No thoracic aortic aneurysm. Moderate atherosclerotic calcifications.    Moderate cardiomegaly. Dense calcifications of the mitral annulus. No pericardial effusion.    CORONARY ARTERY CALCIFICATION: Severe.    UPPER ABDOMEN: Cholelithiasis. Percutaneous gastrostomy catheter. Colonic diverticulosis. Mild to moderate bilateral renal parenchymal atrophy.    MUSCULOSKELETAL: No suspicious abnormality. Multiple old bilateral rib fracture deformities. Diffuse idiopathic skeletal hyperostosis of the thoracic spine.    OTHER: No additionally suspicious abnormality.      Impression    IMPRESSION:  1.  Trace interstitial edema, with associated moderate left and small right pleural effusions.  2.  Mild to moderate diffuse bronchial wall thickening, with associated mucus plugging in peripheral subsegmental branches.  3.  Near complete right middle lobe collapse, new from prior and  without obstructing endobronchial lesion identified. A follow-up chest CT is recommended in 3 months to document resolution of this finding.  4.  Mild mediastinal lymphadenopathy, similar to recent prior, though new from 2019.   5.  Attention on 3 month follow-up chest CT.  6.  Moderate cardiomegaly.  7.  Cholelithiasis.  8.  Colonic diverticulosis.  9.  Indeterminate 3 mm left upper lobe pulmonary nodule, new from 2019.  Attention on 3 month follow-up chest CT.      [Recommend Follow Up: Chest CT in 3 months.]    This report will be copied to the [facility] to ensure a provider acknowledges the finding.      US Thoracentesis    Narrative    EXAM:   1. DIAGNOSTIC AND THERAPEUTIC LEFT  THORACENTESIS  2. ULTRASOUND GUIDANCE  LOCATION: [facility]  DATE: 4/10/2025    INDICATION: Pleural effusion.    PROCEDURE: Informed consent obtained by telephone from the patient's daughter. Time out performed. The chest was prepped and draped in sterile fashion. 10 mL of 1 % lidocaine was infused into the local soft tissues. Under direct ultrasound guidance, a 5   St Helenian catheter system was placed into the pleural effusion.     0.15  liters of cloudy red  fluid were removed and sent to lab.    Patient tolerated procedure well.    Ultrasound imaging was obtained and placed in the patient's permanent medical record.      Impression    IMPRESSION:  Status post left  ultrasound-guided thoracentesis.    Reference CPT Code: 89341   Echocardiogram Complete     Value    LVEF  60-65%    Narrative    [id number]  [id number]  [id number]  [id number]^PAUL^JEET^DEBORAH     [facility]  Echocardiography Laboratory  [address] [place] [address]     Name: BROOKE ARANA  MRN: [id number]  : 1950  Study Date: 2025 01:02 PM  Age: 75 yrs  Gender: Female  Patient Location: [facility]  Reason For Study: Endocarditis  Ordering Physician: JEET MILLER  Referring Physician: Renetta  Noam Wilhelm  Performed By: Amilcar Rosas KRISTEL     BSA: 2.0 m2  Height: 66 in  Weight: 198 lb  HR: 66  BP: 160/48 mmHg  ______________________________________________________________________________  Procedure  Echocardiogram with two-dimensional, color and spectral Doppler. Optison (NDC  #8800-1394) given intravenously.  ______________________________________________________________________________  Interpretation Summary     Left ventricular wall thickness not well seen however appears increased  consistent with severe concentric hypertrophy.  Left ventricular systolic function is normal. The visual ejection fraction is  60-65%.  The right ventricle is normal in size and function.  No significant valvular abnormalities though visualization limited. Recommend  SHAHNAZ if concern for endocarditis.  IVC diameter <2.1 cm collapsing >50% with sniff suggests a normal RA pressure  of 3 mmHg.  ______________________________________________________________________________  Left Ventricle  The left ventricle is normal in size. There is severe concentric left  ventricular hypertrophy. Left ventricular systolic function is normal. The  visual ejection fraction is 60-65%. Left ventricular diastolic function is not  assessable. No regional wall motion abnormalities noted.     Right Ventricle  The right ventricle is normal in size and function.     Atria  Normal left atrial size. Right atrial size is normal.     Mitral Valve  There is mild to moderate mitral annular calcification. There is mild (1+)  mitral regurgitation.     Tricuspid Valve  There is trace tricuspid regurgitation.     Aortic Valve  There is mild trileaflet aortic sclerosis. The aortic valve is not well  visualized. No aortic regurgitation is present.     Pulmonic Valve  The pulmonic valve is not well seen, but is grossly normal.     Vessels  The aortic root is normal size. Normal size ascending aorta. IVC diameter <2.1  cm collapsing >50% with sniff suggests a  normal RA pressure of 3 mmHg.     Pericardium  There is no pericardial effusion.     ______________________________________________________________________________  MMode/2D Measurements & Calculations  IVSd: 1.5 cm  LVIDd: 5.7 cm  LVIDs: 3.5 cm  LVPWd: 1.3 cm  FS: 38.7 %  LV mass(C)d: 360.4 grams  LV mass(C)dI: 181.0 grams/m2     asc Aorta Diam: 3.4 cm  LVOT diam: 2.1 cm  LVOT area: 3.5 cm2  Asc Ao diam index BSA (cm/m2): 1.7  Asc Ao diam index Ht(cm/m): 2.0  RWT: 0.45  TAPSE: 3.7 cm     Doppler Measurements & Calculations  MV E max del: 116.0 cm/sec  MV A max del: 108.0 cm/sec  MV E/A: 1.1  MV dec time: 0.22 sec  PA acc time: 0.11 sec  E/E' av.0  Lateral E/e': 10.3  Medial E/e': 17.8     ______________________________________________________________________________  Report approved by: Eleazar Eckert MD on 2025 03:22 PM         Transesophageal Echocardiogram     Value    LVEF  60-65%    Narrative    [id number]  [id number]  [id number]  [id number]^HIREN^JUAQUIN     [facility]  Echocardiography Laboratory  [address] [place] [id number]     Name: BROOKE ARANA  MRN: [id number]  : 1950  Study Date: 2025 01:57 PM  Age: 75 yrs  Gender: Female  Patient Location: [facility]  Reason For Study: Endocarditis, chronic  Ordering Physician: JUAQUIN MARADIAGA  Referring Physician: Noam Jackson MD  Performed By: Leonid Goodwin     BSA: 2.0 m2  Height: 66 in  Weight: 207 lb  HR: 70  BP: 196/64 mmHg  ______________________________________________________________________________  Procedure  Transesophageal Echocardiogram with two-dimensional, color and spectral  Doppler. SHAHNAZ Probe serial #[id number] was used during the procedure. The heart  rate, respiratory rate and response to care were monitored throughout the  procedure with the assistance of the nurse. 3D image acquisition,  reconstruction, and real-time interpretation was  performed.  ______________________________________________________________________________  Interpretation Summary     There is no evidence of a mass or vegetation on this good quality SHAHNAZ.  Normal biventricular size and function. Left ventricular ejection fraction of  60-65%.  No hemodynamically significant valvular disease.  ______________________________________________________________________________  SHAHNAZ  I determined this patient to be an appropriate candidate for the planned  sedation and procedure and have reassessed the patient immediately prior to  sedation and procedure. Total sedation time: 17 minutes of continuous bedside  1:1 monitoring. Versed (3mg) was given intravenously. Fentanyl (75mcg) was  given intravenously. Consent to the procedure was obtained prior to sedation.  Prior to the exam, the oral cavity was checked and no overcrowding was noted.  The transesophageal probe was passed without difficulty. There were no  complications associated with this procedure.     Left Ventricle  The left ventricle is normal in size. There is normal left ventricular wall  thickness. Left ventricular systolic function is normal. The visual ejection  fraction is 60-65%. There is no thrombus seen in the left ventricle.     Right Ventricle  Normal right ventricle structure and size.     Atria  Normal left atrial size. Right atrial size is normal. There is no atrial shunt  seen. No thrombus is detected in the left atrial appendage.     Mitral Valve  The mitral valve leaflets appear normal. There is no evidence of stenosis,  fluttering, or prolapse. There is no vegetation seen on the mitral valve.  There is mild (1+) mitral regurgitation.     Tricuspid Valve  Normal tricuspid valve. There is no vegetation on the tricuspid valve. There  is trace tricuspid regurgitation.     Aortic Valve  Normal tricuspid aortic valve. No aortic stenosis is present.     Pulmonic Valve  Normal pulmonic valve. There is no vegetation on  the pulmonic valve.     Vessels  The aortic root is normal size. Normal size ascending aorta.     Pericardial/Pleural  There is no pericardial effusion.     Rhythm  Sinus rhythm was noted.  ______________________________________________________________________________  Report approved by: Shelton Ortiz MD on 04/01/2025 06:13 PM     ______________________________________________________________________________        *Note: Due to a large number of results and/or encounters for the requested time period, some results have not been displayed. A complete set of results can be found in Results Review.
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
## 2025-04-18 ENCOUNTER — TELEPHONE (OUTPATIENT)
Dept: HOME HEALTH SERVICES | Facility: HOME HEALTH | Age: 75
End: 2025-04-18

## 2025-04-18 PROCEDURE — S9342 HIT ENTERAL PUMP DIEM: HCPCS

## 2025-04-18 NOTE — PROGRESS NOTES
Nursing Visit Note:  Nurse visit today for enteral teach for Supriya Herr.     present during visit today: Not Applicable.    Intravenous Access:  No Intravenous access/labs at this visit.    Education Provided:     Patient Education focused on enteral teach using infinity pump.     Note: patient is home living with daughter and her family. Pt has a g-tube and will be doing enteral feeds for 12 hours overnight   Gave instructions on how to  set up enteral feeds, store food, flush Gtube, run infinity pump    Saline administered: 0 (ml)    Supply Check:   Does the patient have all the supplies they need for the next visit?  Yes    Next visit plan: second teach tomorrow so daughter can perform set up    Faina Sebastian 4/17/2025

## 2025-04-19 PROCEDURE — S9342 HIT ENTERAL PUMP DIEM: HCPCS

## 2025-04-19 NOTE — TELEPHONE ENCOUNTER
Triage Note/Care Coordination    Identify the person you spoke with and their relationship to the patient: daughter    Reason for the call: Administration problem/concern    Enteral-Related Issues    Type of therapy: Intermittent    Describe the problem: Pump problem    Interventions: Other: Daughter stating she is unable to prime tubing. It primes very slowly and then the pump stops. Ensured tubing is not connected to patient nor is it capped. Attempted to reconnect the tubing cassette into the pump with not change. Next step is to change out bag and tubing. Daughter agreed to try this and call back if still not working. Never received call back.      Outcomes:     What is the plan for ongoing care of this patient: Problem resolved, patient will remain in home    Was there harm, averted harm, or unexpected death of the patient? No    Does the patient/caregiver verbalize agreement with the plan? YES      Follow-Up Communication    None    Nalini Bradshaw RN, BSN  [facility]  [email or web address]  [phone]

## 2025-04-20 PROCEDURE — S9342 HIT ENTERAL PUMP DIEM: HCPCS

## 2025-04-21 ENCOUNTER — DOCUMENTATION ONLY (OUTPATIENT)
Dept: OTHER | Facility: CLINIC | Age: 75
End: 2025-04-21
Payer: COMMERCIAL

## 2025-04-21 ENCOUNTER — PATIENT OUTREACH (OUTPATIENT)
Dept: GERIATRIC MEDICINE | Facility: CLINIC | Age: 75
End: 2025-04-21
Payer: COMMERCIAL

## 2025-04-21 PROCEDURE — S9342 HIT ENTERAL PUMP DIEM: HCPCS

## 2025-04-21 NOTE — Clinical Note
I am the [facility] care coordinator for Supriya Herr I am writing to inform you that I completed Supriya's elderly waiver assessment on 4/21 after her discharge to home.  Elderly waiver is opened and formal supportive services have started to support this transition. No questions or concerns at this time.   All of my documentation can be found in EPIC. Please do not hesitate to contact me with any questions or concerns.  Thank you,  Ellen Guerrero RN, BSN, PHN [facility] [phone] Fax: [phone]

## 2025-04-22 ENCOUNTER — PATIENT OUTREACH (OUTPATIENT)
Dept: GERIATRIC MEDICINE | Facility: CLINIC | Age: 75
End: 2025-04-22
Payer: COMMERCIAL

## 2025-04-22 ENCOUNTER — MEDICAL CORRESPONDENCE (OUTPATIENT)
Dept: HEALTH INFORMATION MANAGEMENT | Facility: CLINIC | Age: 75
End: 2025-04-22
Payer: COMMERCIAL

## 2025-04-22 PROCEDURE — S9342 HIT ENTERAL PUMP DIEM: HCPCS

## 2025-04-22 SDOH — HEALTH STABILITY: PHYSICAL HEALTH: ON AVERAGE, HOW MANY DAYS PER WEEK DO YOU ENGAGE IN MODERATE TO STRENUOUS EXERCISE (LIKE A BRISK WALK)?: 0 DAYS

## 2025-04-22 SDOH — HEALTH STABILITY: PHYSICAL HEALTH: ON AVERAGE, HOW MANY MINUTES DO YOU ENGAGE IN EXERCISE AT THIS LEVEL?: 0 MIN

## 2025-04-22 NOTE — PROGRESS NOTES
[facility] Care Coordination Contact    Arranged transportation thru [facility] provider [facility] (phone: [phone]) for the below appts:      Appt Date & Time: Thurs 4/24 @ 3:20 PM  Clinic Name & Address:  [facility] [address] [facility] [address]  [facility] [address]   Transportation Provider: [facility] [phone]   time: 2:20 PM  Return ride  time: will call      Appt Date & Time: Thurs 5/1 @ 2:15 PM  Clinic Name & Address:  [facility] - [place] [address]  [address] [facility] [address]   Transportation Provider: [facility]ITA [phone]   time:  1:15 PM   Return ride  time: will call       Appt Date & Time: Dialysis on M, W & F @ 2 PM  (Scheduled thru 5/30 at this time)   Clinic Name & Address:  [facility] - [address] [place] [address]   Transportation Provider: [facility]ITA [phone]   time:  1:00 PM  Return ride  time: 6:15 PM      Notified daughter Caroline & CC of  times.    Leslie Braun  Care Management Specialist   [facility]   [phone]

## 2025-04-22 NOTE — PROGRESS NOTES
[facility] Care Coordination Contact    List of supplies and DME needs:          Chux pads- Able to do 100/ mo through MA.  [facility]A.  BP cuff/ unit- In home.  Bedpan- MA through [facility].  Briefs- Qty for 4 per day. XL. 8 pack/mo. Order  from Dr. Jackson, [facility] will reach back out. MA through [facility].  Gloves size L. 1 box per/mo. MA through [facility].  Spirometer- [facility] does not carry.    Coloplast barrier cream- 1 per mo. MA through [facility].        EW- Prevlog repositioning sheet, Vashe wound solution [id number] 8.5 fl oz. (Handi), GetGoing white washcloths [place] 50 pieces(Handi), perineal and skin  rise free Ana(APA), box of masks, Wipes- 8 pack wipes/mo $10 per pack. (APA. Will resubmit for service agreement when EW open), pill .  EW items ordered when EW open.     Daughter Caroline golden.     Ellen Guerrero RN, BSN, PHN  [facility]  [phone]  Fax: [phone]

## 2025-04-23 ENCOUNTER — MEDICAL CORRESPONDENCE (OUTPATIENT)
Dept: HEALTH INFORMATION MANAGEMENT | Facility: CLINIC | Age: 75
End: 2025-04-23
Payer: COMMERCIAL

## 2025-04-23 PROCEDURE — S9342 HIT ENTERAL PUMP DIEM: HCPCS

## 2025-04-24 ENCOUNTER — MEDICAL CORRESPONDENCE (OUTPATIENT)
Dept: HEALTH INFORMATION MANAGEMENT | Facility: CLINIC | Age: 75
End: 2025-04-24

## 2025-04-24 ENCOUNTER — HOME INFUSION BILLING (OUTPATIENT)
Dept: HOME HEALTH SERVICES | Facility: HOME HEALTH | Age: 75
End: 2025-04-24
Payer: COMMERCIAL

## 2025-04-24 ENCOUNTER — OFFICE VISIT (OUTPATIENT)
Dept: FAMILY MEDICINE | Facility: CLINIC | Age: 75
End: 2025-04-24
Payer: COMMERCIAL

## 2025-04-24 VITALS
SYSTOLIC BLOOD PRESSURE: 169 MMHG | OXYGEN SATURATION: 96 % | HEART RATE: 76 BPM | DIASTOLIC BLOOD PRESSURE: 55 MMHG | TEMPERATURE: 96.8 F | RESPIRATION RATE: 16 BRPM

## 2025-04-24 DIAGNOSIS — Z89.612 S/P AKA (ABOVE KNEE AMPUTATION) UNILATERAL, LEFT (H): ICD-10-CM

## 2025-04-24 DIAGNOSIS — Z99.2 ESRD ON HEMODIALYSIS (H): ICD-10-CM

## 2025-04-24 DIAGNOSIS — J96.00 ACUTE RESPIRATORY FAILURE, UNSPECIFIED WHETHER WITH HYPOXIA OR HYPERCAPNIA (H): ICD-10-CM

## 2025-04-24 DIAGNOSIS — E11.40 TYPE 2 DIABETES MELLITUS WITH DIABETIC NEUROPATHY, WITH LONG-TERM CURRENT USE OF INSULIN (H): ICD-10-CM

## 2025-04-24 DIAGNOSIS — Z79.4 TYPE 2 DIABETES MELLITUS WITH DIABETIC NEUROPATHY, WITH LONG-TERM CURRENT USE OF INSULIN (H): ICD-10-CM

## 2025-04-24 DIAGNOSIS — N17.9 ACUTE RENAL FAILURE, UNSPECIFIED ACUTE RENAL FAILURE TYPE: ICD-10-CM

## 2025-04-24 DIAGNOSIS — Z09 HOSPITAL DISCHARGE FOLLOW-UP: Primary | ICD-10-CM

## 2025-04-24 DIAGNOSIS — J44.9 CHRONIC OBSTRUCTIVE PULMONARY DISEASE, UNSPECIFIED COPD TYPE (H): ICD-10-CM

## 2025-04-24 DIAGNOSIS — Z99.3 WHEELCHAIR DEPENDENCE: ICD-10-CM

## 2025-04-24 DIAGNOSIS — N18.6 ESRD ON HEMODIALYSIS (H): ICD-10-CM

## 2025-04-24 DIAGNOSIS — L89.159 PRESSURE INJURY OF SKIN OF SACRAL REGION, UNSPECIFIED INJURY STAGE: ICD-10-CM

## 2025-04-24 DIAGNOSIS — F51.04 CHRONIC INSOMNIA: ICD-10-CM

## 2025-04-24 DIAGNOSIS — F33.1 MODERATE RECURRENT MAJOR DEPRESSION (H): ICD-10-CM

## 2025-04-24 DIAGNOSIS — R11.0 CHRONIC NAUSEA: ICD-10-CM

## 2025-04-24 DIAGNOSIS — A41.9 BACTERIAL SEPSIS (H): ICD-10-CM

## 2025-04-24 DIAGNOSIS — I10 HYPERTENSION GOAL BP (BLOOD PRESSURE) < 140/90: ICD-10-CM

## 2025-04-24 DIAGNOSIS — T85.598S FEEDING TUBE DYSFUNCTION, SEQUELA: ICD-10-CM

## 2025-04-24 DIAGNOSIS — I10 ESSENTIAL HYPERTENSION: ICD-10-CM

## 2025-04-24 PROCEDURE — S9342 HIT ENTERAL PUMP DIEM: HCPCS

## 2025-04-24 PROCEDURE — B4154 EF SPEC METABOLIC NONINHERIT: HCPCS

## 2025-04-24 RX ORDER — ONDANSETRON 4 MG/1
4 TABLET, ORALLY DISINTEGRATING ORAL EVERY 8 HOURS PRN
Qty: 30 TABLET | Refills: 3 | Status: SHIPPED | OUTPATIENT
Start: 2025-04-24 | End: 2025-05-24

## 2025-04-24 ASSESSMENT — PATIENT HEALTH QUESTIONNAIRE - PHQ9: SUM OF ALL RESPONSES TO PHQ QUESTIONS 1-9: 7

## 2025-04-24 NOTE — PROGRESS NOTES
"  Assessment & Plan     Hospital discharge follow-up  Taking home by her daughter who noted that at times her mother received substandard care at the TCU  Daughter feeling overwhelmed at time doing her 24 hours cares but looking into other resources  patient agrees with this plan \" would rather be with my daughter\"    Acute respiratory failure, unspecified whether with hypoxia or hypercapnia (H)  Doing better   Needs bid helping    Bacterial sepsis (H)  Resolved   Off abx    ESRD on hemodialysis (H)  Going as directed    Essential hypertension  On hydrazine \" helping   Expand that her systolic can run higher at times    Feeding tube dysfunction, sequela  Has feeding questions   Her daughter will contact GI as this is not something we attend to  - Adult GI  Referral - Consult Only; Future    Chronic nausea  Use prn  - ondansetron (ZOFRAN ODT) 4 MG ODT tab; Take 1 tablet (4 mg) by mouth every 8 hours as needed for nausea.    Chronic insomnia  Use prn  - melatonin 3 MG tablet; Take 1 tablet (3 mg) by mouth nightly as needed for sleep.    Hypertension goal BP (blood pressure) < 140/90  Reviewed medications     Type 2 diabetes mellitus with diabetic neuropathy, with long-term current use of insulin (H)    Well controlled   Lab Results   Component Value Date    A1C 6.5 01/17/2025    A1C 6.8 04/21/2023    A1C 6.2 11/23/2021    A1C 6.2 04/09/2021    A1C 6.0 06/22/2018    A1C 5.4 03/29/2018    A1C 6.8 01/09/2018    A1C 9.6 06/09/2017         Acute renal failure, unspecified acute renal failure type  Now on dialysis  Follow up with consultant as planned.   Wheelchair bound  Reveiwedskin cares    Pressure injury of skin of sacral region, unspecified injury stage  Daugjter doing cares '  :\" is  smaller\"    S/P AKA (above knee amputation) unilateral, left (H)      Chronic obstructive pulmonary disease, unspecified COPD type (H)  On bid nebs ( steroid and LAMDA)    Moderate recurrent major depression (H)  \" Dognb OK\" " "happy to be home        MED REC REQUIRED  Post Medication Reconciliation Status:     BMI  Estimated body mass index is 29.75 kg/m  as calculated from the following:    Height as of 3/27/25: 1.676 m (5' 6\").    Weight as of 4/16/25: 83.6 kg (184 lb 4.9 oz).         Follow-up 1 month      Subjective   Supriya is a 75 year old, presenting for the following health issues:  Hospital F/U (3/27 at [facility] for Acute Respiratory Failure)    History of Present Illness       Reason for visit:  Hospital follow  up   She is taking medications regularly.          Hospital Follow-up Visit:    Hospital/Nursing Home/IP Rehab Facility: [facility]  Most Recent Admission Date: 3/27/2025   Most Recent Admission Diagnosis: Generalized weakness - R53.1  Constipation, unspecified constipation type - K59.00  Leukocytosis, unspecified type - D72.829  ESRD on hemodialysis (H) - N18.6, Z99.2     Most Recent Discharge Date: 4/17/2025   Most Recent Discharge Diagnosis: Generalized weakness - R53.1  Leukocytosis, unspecified type - D72.829  Constipation, unspecified constipation type - K59.00  ESRD on hemodialysis (H) - N18.6, Z99.2  Acute kidney failure, unspecified - N17.9  Dyspnea, unspecified type - R06.00  Severe sepsis with acute organ dysfunction (H) - A41.9, R65.20  ESRD (end stage renal disease) on dialysis (H) - N18.6, Z99.2  Pneumonia due to COVID-19 virus - U07.1, J12.82  Encounter for adjustment and management of vascular access device - Z45.2  ESRD (end stage renal disease) (H) - N18.6  Acute and chronic respiratory failure with hypoxia (H) - J96.21  Pulmonary hypertension (H) - I27.20  Type 2 diabetes mellitus with hyperglycemia, with long-term current use of insulin (H) - E11.65, Z79.4  ESRD on dialysis (H) - N18.6, Z99.2  Cough - R05.9  Hypertension goal BP (blood pressure) < 140/90 - I10  Paroxysmal atrial fibrillation (H) - I48.0  Hypercholesterolemia - E78.00  Type 2 diabetes, HbA1C goal < " 8% (H) - E11.9  Influenza A - J10.1  Intertrigo - L30.4  Gastroesophageal reflux disease with esophagitis without hemorrhage - K21.00  Parainfluenza - B34.8  Collapse of right lung - J98.11  Seasonal allergic rhinitis, unspecified trigger - J30.2  Episode of recurrent major depressive disorder, unspecified depression episode severity - F33.9  G tube feedings (H) - Z93.1  Nausea - R11.0  Vitamin D deficiency - E55.9  Bronchitis - J40  Mucus plugging of bronchi - T17.500A  JONE (obstructive sleep apnea) - G47.33   Was the patient in the ICU or did the patient experience delirium during hospitalization?  No  Do you have any other stressors you would like to discuss with your provider? Financial Concerns, Housing Concerns, Transportation Concerns, Grief or Loss, and Health Concerns    Problems taking medications regularly:  needs RX  Medication changes since discharge: None  Problems adhering to non-medication therapy:  high care needs    Summary of hospitalization:  [facility] discharge summary reviewed  Diagnostic Tests/Treatments reviewed.  Follow up needed: see GI about feedingtube  Other Healthcare Providers Involved in Patient s Care:         Care Coordination  Update since discharge: stable.         Plan of care communicated with patient and family                   Review of Systems  Constitutional, HEENT, cardiovascular, pulmonary, gi and gu systems are negative, except as otherwise noted.      Objective    BP (!) 169/55 (BP Location: Right arm, Patient Position: Sitting, Cuff Size: Adult Regular)   Pulse 76   Temp 96.8  F (36  C) (Temporal)   Resp 16   LMP  (LMP Unknown)   SpO2 96%   There is no height or weight on file to calculate BMI.  Physical Exam   GENERAL: alert, frail, elderly, fatigued, and in wheelchair  EYES: Eyes grossly normal to inspection, PERRL and conjunctivae and sclerae normal  HENT: ear canals and TM's normal, nose and mouth without ulcers or lesions  NECK: no  adenopathy, no asymmetry, masses, or scars  RESP: lungs clear to auscultation - no rales, rhonchi or wheezes  CV: regular rates and rhythm, normal S1 S2, no S3 or S4, and no murmur, click or rub  ABDOMEN: soft, nontender, no hepatosplenomegaly, no masses and bowel sounds normal  ABDOMEN: well-healed incision with feeding tube  and no redness or drainage  NEURO: sensory exam grossly normal and mentation intact  PSYCH: mentation appears normal, affect normal/bright    No results displayed because visit has over 200 results.              Signed Electronically by: Noam Jackson MD

## 2025-04-25 PROCEDURE — 85018 HEMOGLOBIN: CPT | Performed by: INTERNAL MEDICINE

## 2025-04-25 PROCEDURE — 84132 ASSAY OF SERUM POTASSIUM: CPT | Performed by: INTERNAL MEDICINE

## 2025-04-25 PROCEDURE — S9342 HIT ENTERAL PUMP DIEM: HCPCS

## 2025-04-25 PROCEDURE — 84520 ASSAY OF UREA NITROGEN: CPT | Performed by: INTERNAL MEDICINE

## 2025-04-26 ENCOUNTER — TELEPHONE (OUTPATIENT)
Dept: HOME HEALTH SERVICES | Facility: HOME HEALTH | Age: 75
End: 2025-04-26
Payer: COMMERCIAL

## 2025-04-26 PROCEDURE — S9342 HIT ENTERAL PUMP DIEM: HCPCS

## 2025-04-27 ENCOUNTER — LAB REQUISITION (OUTPATIENT)
Dept: LAB | Facility: CLINIC | Age: 75
End: 2025-04-27

## 2025-04-27 LAB
BUN SERPL-MCNC: 18 MG/DL (ref 8–23)
BUN SERPL-MCNC: 77.2 MG/DL (ref 8–23)
HGB BLD-MCNC: 10.9 G/DL (ref 11.7–15.7)
POTASSIUM SERPL-SCNC: 4.7 MMOL/L (ref 3.4–5.3)

## 2025-04-27 PROCEDURE — S9342 HIT ENTERAL PUMP DIEM: HCPCS

## 2025-04-27 NOTE — TELEPHONE ENCOUNTER
Triage Note/Care Coordination    Identify the person you spoke with and their relationship to the patient: daughter    Reason for the call: Other: Patients daughter Caroline called reporting feeding tube says no flow.     Enteral-Related Issues    Type of therapy: Continuous    Describe the problem: Other: Patients daughter Caroline reported the alarm was going off and she was having trouble figuring it out but then decided she was just going to redo everything and set it up again but found some of the tubing was twisted and kinked off. Caroline reported she untwisted the tubing and it seems to be working fine now.     Interventions: Informed Caroline if the pump ever says no flow it usually means that something is kinked off or blocked. Instructed to always check tubing, Caroline verbalized understanding. Instructed Caroline to call back with any further questions or concerns, Caroline stated okay thank you.   Outcomes:     What is the plan for ongoing care of this patient: Problem resolved, patient will remain in home    Was there harm, averted harm, or unexpected death of the patient? No    Does the patient/caregiver verbalize agreement with the plan? YES    Follow-Up Communication    None    Prescriber contacted (name, date, time): Flavia ALTAMIRANO RN 4/26/25 7920

## 2025-04-28 PROCEDURE — S9342 HIT ENTERAL PUMP DIEM: HCPCS

## 2025-04-29 ENCOUNTER — VIRTUAL VISIT (OUTPATIENT)
Dept: PHARMACY | Facility: CLINIC | Age: 75
End: 2025-04-29
Attending: STUDENT IN AN ORGANIZED HEALTH CARE EDUCATION/TRAINING PROGRAM
Payer: COMMERCIAL

## 2025-04-29 DIAGNOSIS — J98.11 COLLAPSE OF RIGHT LUNG: ICD-10-CM

## 2025-04-29 DIAGNOSIS — I48.0 PAROXYSMAL ATRIAL FIBRILLATION (H): Primary | ICD-10-CM

## 2025-04-29 DIAGNOSIS — N18.6 ESRD (END STAGE RENAL DISEASE) ON DIALYSIS (H): ICD-10-CM

## 2025-04-29 DIAGNOSIS — F33.9 EPISODE OF RECURRENT MAJOR DEPRESSIVE DISORDER, UNSPECIFIED DEPRESSION EPISODE SEVERITY: ICD-10-CM

## 2025-04-29 DIAGNOSIS — K59.00 CONSTIPATION, UNSPECIFIED CONSTIPATION TYPE: ICD-10-CM

## 2025-04-29 DIAGNOSIS — B34.8 PARAINFLUENZA: ICD-10-CM

## 2025-04-29 DIAGNOSIS — E11.9 TYPE 2 DIABETES, HBA1C GOAL < 8% (H): ICD-10-CM

## 2025-04-29 DIAGNOSIS — R11.0 NAUSEA: ICD-10-CM

## 2025-04-29 DIAGNOSIS — I10 HYPERTENSION GOAL BP (BLOOD PRESSURE) < 140/90: ICD-10-CM

## 2025-04-29 DIAGNOSIS — K21.9 GASTROESOPHAGEAL REFLUX DISEASE, UNSPECIFIED WHETHER ESOPHAGITIS PRESENT: ICD-10-CM

## 2025-04-29 DIAGNOSIS — E78.00 HYPERCHOLESTEROLEMIA: ICD-10-CM

## 2025-04-29 DIAGNOSIS — Z99.2 ESRD (END STAGE RENAL DISEASE) ON DIALYSIS (H): ICD-10-CM

## 2025-04-29 PROCEDURE — S9342 HIT ENTERAL PUMP DIEM: HCPCS

## 2025-04-29 PROCEDURE — 99605 MTMS BY PHARM NP 15 MIN: CPT | Mod: 93 | Performed by: PHARMACIST

## 2025-04-29 PROCEDURE — 1111F DSCHRG MED/CURRENT MED MERGE: CPT | Mod: 93 | Performed by: PHARMACIST

## 2025-04-29 PROCEDURE — 99607 MTMS BY PHARM ADDL 15 MIN: CPT | Mod: 93 | Performed by: PHARMACIST

## 2025-04-29 RX ORDER — FORMOTEROL FUMARATE 20 UG/2ML
20 SOLUTION RESPIRATORY (INHALATION) EVERY 12 HOURS
Qty: 360 ML | Refills: 3 | Status: SHIPPED | OUTPATIENT
Start: 2025-04-29

## 2025-04-29 RX ORDER — SERTRALINE HYDROCHLORIDE 25 MG/1
75 TABLET, FILM COATED ORAL DAILY
Qty: 90 TABLET | Refills: 3 | Status: SHIPPED | OUTPATIENT
Start: 2025-04-29

## 2025-04-29 RX ORDER — BLOOD-GLUCOSE SENSOR
EACH MISCELLANEOUS
Qty: 6 EACH | Refills: 1 | Status: SHIPPED | OUTPATIENT
Start: 2025-04-29

## 2025-04-29 RX ORDER — HYDRALAZINE HYDROCHLORIDE 100 MG/1
100 TABLET, FILM COATED ORAL EVERY 8 HOURS
Qty: 90 TABLET | Refills: 3 | Status: SHIPPED | OUTPATIENT
Start: 2025-04-29

## 2025-04-29 RX ORDER — BUDESONIDE 0.5 MG/2ML
0.5 INHALANT ORAL 2 TIMES DAILY
Qty: 120 ML | Refills: 11 | Status: SHIPPED | OUTPATIENT
Start: 2025-04-29

## 2025-04-29 RX ORDER — SENNOSIDES 8.6 MG
TABLET ORAL
Status: SHIPPED
Start: 2025-04-29

## 2025-04-29 RX ORDER — AMLODIPINE BESYLATE 10 MG/1
10 TABLET ORAL DAILY
Qty: 90 TABLET | Refills: 3 | Status: SHIPPED | OUTPATIENT
Start: 2025-04-29

## 2025-04-29 RX ORDER — OMEPRAZOLE 20 MG/1
20 CAPSULE, DELAYED RELEASE ORAL DAILY
Qty: 90 CAPSULE | Refills: 3 | Status: SHIPPED | OUTPATIENT
Start: 2025-04-29

## 2025-04-29 RX ORDER — ATORVASTATIN CALCIUM 20 MG/1
20 TABLET, FILM COATED ORAL EVERY EVENING
Qty: 90 TABLET | Refills: 3 | Status: SHIPPED | OUTPATIENT
Start: 2025-04-29

## 2025-04-29 RX ORDER — AMIODARONE HYDROCHLORIDE 200 MG/1
200 TABLET ORAL DAILY
Qty: 90 TABLET | Refills: 1 | Status: SHIPPED | OUTPATIENT
Start: 2025-04-29

## 2025-04-29 NOTE — LETTER
"Recommended To-Do List      Prepared on: Apr 29, 2025       You can get the best results from your medications by completing the items on this \"To-Do List.\"      Bring your To-Do List when you go to your doctor. And, share it with your family or caregivers.    My To-Do List:  What we talked about: What I should do:   Your medication dosage being too high    Decrease your dosage of sennosides (SENOKOT)          What we talked about: What I should do:   The administration of your medication(s)    Change the medication you are taking from pantoprazole (PROTONIX) to omeprazole (PriLOSEC)          What we talked about: What I should do:   Your medication dosage being too high    Decrease your dosage of insulin glargine (LANTUS PEN)          What we talked about: What I should do:                     "

## 2025-04-29 NOTE — LETTER
_  Medication List        Prepared on: Apr 29, 2025     Bring your Medication List when you go to the doctor, hospital, or   emergency room. And, share it with your family or caregivers.     Note any changes to how you take your medications.  Cross out medications when you no longer use them.    Medication How I take it Why I use it Prescriber   acetaminophen (TYLENOL) 325 MG tablet Take 2 tablets (650 mg) by mouth every 4 hours as needed for mild pain. ESRD on Dialysis (H) Debbie Zarco MD   albuterol (PROAIR HFA/PROVENTIL HFA/VENTOLIN HFA) 108 (90 Base) MCG/ACT inhaler Inhale 2 puffs into the lungs every 6 hours as needed for shortness of breath or wheezing. Parainfluenza Debbie Zarco MD   amiodarone (PACERONE) 200 MG tablet Take 1 tablet (200 mg) by mouth or Feeding Tube daily. Paroxysmal Atrial Fibrillation (H) Noam Jackson MD   amLODIPine (NORVASC) 10 MG tablet Take 1 tablet (10 mg) by mouth or Feeding Tube daily. Take 1 table  by mouth once daily on every Tue, Thu, Sa, Sun Hypertension Goal BP (Blood Pressure) < 140/90 Noam Jackson MD   apixaban ANTICOAGULANT (ELIQUIS) 5 MG tablet Take 1 tablet (5 mg) by mouth or Feeding Tube 2 times daily. Paroxysmal Atrial Fibrillation (H) Noam Jackson MD   atorvastatin (LIPITOR) 20 MG tablet Take 1 tablet (20 mg) by mouth or Feeding Tube every evening. Hypercholesterolemia Noam Jackson MD   budesonide (PULMICORT) 0.5 MG/2ML neb solution Take 2 mLs (0.5 mg) by nebulization 2 times daily. Collapse of right lung; Parainfluenza Noam Jackson MD   cetirizine (ZYRTEC) 10 MG tablet Take 1 tablet (10 mg) by mouth at bedtime. Seasonal allergic rhinitis, unspecified trigger Debbie Zarco MD   Continuous Glucose Sensor (FREESTYLE [place] 2 PLUS SENSOR) MISC Change every 15 days. Type 2 Diabetes, HbA1c Goal < 8% (H) Noam Jackson MD   dextromethorphan-guaiFENesin (MUCINEX DM)  MG 12 hr  tablet Take 1 tablet by mouth 2 times daily as needed for cough. Parainfluenza Debbie Zarco MD   doxycycline hyclate (VIBRAMYCIN) 100 MG capsule Take 1 capsule (100 mg) by mouth every 12 hours for 14 days. Bronchitis; Mucus plugging of bronchi Debbie Zarco MD   formoterol (PERFOROMIST) 20 MCG/2ML neb solution Take 2 mLs (20 mcg) by nebulization every 12 hours. Collapse of right lung Noam Jackson MD   Glucagon (BAQSIMI ONE PACK) 3 MG/DOSE nasal powder Spray 1 spray (3 mg) in nostril See Admin Instructions. USE ONLY FOR SEVERE HYPOGLYCEMIA. Type 2 diabetes mellitus with hyperglycemia, with long-term current use of insulin (H) Debbie Zarco MD   hydrALAZINE (APRESOLINE) 100 MG tablet Take 1 tablet (100 mg) by mouth or Feeding Tube every 8 hours. Hypertension Goal BP (Blood Pressure) < 140/90 Noam Jackson MD   insulin glargine (LANTUS PEN) 100 UNIT/ML pen Inject 10 Units subcutaneously at bedtime. Type 2 Diabetes, HbA1c Goal < 8% (H) Noam Jackson MD   ipratropium - albuterol 0.5 mg/2.5 mg/3 mL (DUONEB) 0.5-2.5 (3) MG/3ML neb solution Take 1 vial (3 mLs) by nebulization every 4 hours as needed for wheezing or shortness of breath. Influenza A Debbie Zarco MD   TC Ice Cream Renal Support 1.8 PO LIQD Place 960 mLs into G tube daily. Infuse TC Ice Cream Renal support at 80 ml/hr x 12 hours (~4 cartons per day) via pump into G-tubeWater flush: 60 ml before and after each feedingKcals: 1728 kcal/day Nausea Debbie Zarco MD   melatonin 3 MG tablet Take 1 tablet (3 mg) by mouth nightly as needed for sleep. Chronic Insomnia Noam Jackson MD   miconazole (MICATIN) 2 % external powder Apply topically 2 times daily as needed (redness under breasts/groin). Apply twice daily to skin folds as needed Intertrigo Debbie Zarco MD   midodrine (PROAMATINE) 5 MG tablet Take 1 tablet (5 mg) by mouth three times a week. Mon, Wed, and Fri (Prior to dialysis)  ESRD (End Stage Renal Disease) (H) Debbie Zarco MD   omeprazole (PRILOSEC) 20 MG DR capsule Take 1 capsule (20 mg) by mouth daily. Gastroesophageal reflux disease, unspecified whether esophagitis present Noam Jackson MD   ondansetron (ZOFRAN ODT) 4 MG ODT tab Take 1 tablet (4 mg) by mouth every 8 hours as needed for nausea. Chronic Nausea Noam Jackson MD   polyethylene glycol (MIRALAX) 17 GM/Dose powder Take 17 g by mouth daily. Constipation, unspecified constipation type Debbie Zarco MD   sennosides (SENOKOT) 8.6 MG tablet Decrease senna to 2 tabs AM and 1 tab PM Constipation, unspecified constipation type Noam Jackson MD   sertraline (ZOLOFT) 25 MG tablet Take 3 tablets (75 mg) by mouth or Feeding Tube daily. Episode of recurrent major depressive disorder, unspecified depression episode severity Noam Jackson MD   sevelamer carbonate (RENVELA) 800 MG tablet Take 1 tablet (800 mg) by mouth 3 times daily (with meals). ESRD on hemodialysis (H) Debbie Zarco MD   Vitamin D3 50 mcg (2000 units) tablet Take 1 tablet (50 mcg) by mouth daily. Vitamin D Deficiency Debbie Zarco MD         Add new medications, over-the-counter drugs, herbals, vitamins, or  minerals in the blank rows below.    Medication How I take it Why I use it Prescriber                                      Allergies:      - Ampicillin-sulbactam Sodium - Rash  - Penicillins - Rash        Side effects I have had:      Not on File        Other Information:              My notes and questions:

## 2025-04-29 NOTE — LETTER
April 30, 2025  Supriya Herr  [address] [place] [id number]    Dear Ms. Herr, [facility]     Thank you for talking with me on Apr 29, 2025 about your health and medications. As a follow-up to our conversation, I have included two documents:      Your Recommended To-Do List has steps you should take to get the best results from your medications.  Your Medication List will help you keep track of your medications and how to take them.    If you want to talk about these documents, please call Lissa Dunbar RPH at phone: [phone], Monday-Friday 8-4:30pm.    I look forward to working with you and your doctors to make sure your medications work well for you.    Sincerely,  Lissa Dunbar RPH  [facility] Pharmacist, [facility]

## 2025-04-29 NOTE — PROGRESS NOTES
Medication Therapy Management (MTM) Encounter    ASSESSMENT:                            Medication Adherence/Access: No issues identified.    A fib/hypertension:   Labile blood pressure, continue current medications. OK to hold hydralazine for low BP <100/50and administer hydralazine in 6-8 hour window    Diabetes  Patient is meeting A1c goal of < 8%.  Patient would benefit from dose reduction of insulin glargine due to recent hypoglycemia. Continue at once daily dosing, will decrease to 10 units daily.    Updating to Avery 2+ as her current product will be discontinued.    Hyperlipidemia  Taking appropriate statin.    COPD  Budesonide and formoterol nebs are compatible solutions, OK to administer together.    Constipation   Frequent stooling, reduce senna to 3 tab/day. OK to continue reducing as needed to target 1-2 stools/day.  OK to remain off Miralax since stool is soft.    GERD  OK to continue omeprazole since taking medications orally.  Unnecessary to use suspension.     Mental health  Stable    ESRD  Continue care per dialysis staff    Nausea  Controlled with prn ondansetron.     PLAN:                            Continue omeprazole 20mg daily - take 1 hour prior to tube feeding    Decrease Lantus to 10 units once a day    Decrease senna to 2 tabs AM and 1 tab PM    OK to mix budesonide and formoterol - administer together twice daily    OK to administer hydralazine doses 6-8 hours apart for blood pressure >100/50    Switch Avery to 2+ at next refill.     Follow-up: 1 week    SUBJECTIVE/OBJECTIVE:                          Supriya Herr is a 75 year old female seen for a transitions of care visit. She was discharged from [facility] on 4/17/25 for Acute Respiratory Failure likely 2/2 Parainfluenza Bronchitis: Resolved    . Patient was accompanied by her daughter Caroline.     Reason for visit: hospital follow-up.    Allergies/ADRs: Reviewed in chart  Past Medical History: Reviewed in chart  Tobacco: She  reports that she quit smoking about 7 years ago. Her smoking use included cigarettes. She started smoking about 61 years ago. She has a 26.9 pack-year smoking history. She has never used smokeless tobacco.  Alcohol: not currently using    Medication Adherence/Access: Patient uses pill box(es) and has assistance with medication administration: family member.  Patient takes medications 3 time(s) per day. 8AM-10AM, 4PM, 10PM-12AM    Afib/Hypertension  Eliquis 5mg twice daily for stroke prevention  Amiodarone 200mg daily in AM  Hydralazine 100mg every 8 hours  Amlodipine 10mg daily in the evening  Patient reports no current medication side effects.    Patient does not have a history of GI bleed.   Patient self-monitors blood pressure.  Home BP monitoring   137/73 today, 157/55, 151/60, 161/59, 173/58 (just got her up into her chair), 144/63, 165/63, 128/56 lowest.       Diabetes   Insulin glargine 12 units once a day (daughter has been reducing dose due to hypoglycemia)    Current diabetes symptoms: hypoglycemia NO SYMPTOMS  Diet/Exercise: 12 hour feeding tube  Eating a little during the day, has a call into the dietician on how to wean. She is not very hungry.      Blood sugar monitoring: 3-4 time(s) daily with Freestyle Avery; Ranges: (patient reported)   Fasting 106, 107  PM 60s   Date morning midday evening    4/25 193  122    4/26 165 87 67 Lantus 15u Held PM Lantus    139 110 62 Lantus 15u Held PM Lantus    107 144, 129,156 100 Decreased to Lantus 12u. Held PM Lantus   4/29 106 78,99,96  No insulin today                     Eye exam is up to date  Foot exam is up to date    Hyperlipidemia   atorvastatin 20mg daily  Patient reports no significant myalgias or other side effects.       COPD   Budesonide AM and PM when daughter has time to administer   Formoterol 10AM and 10PM   Albuterol MDI as needed - hasn't taken any  Duoneb as needed - hasn't taken any      Patient reports no current medication side effects.     Feeling better     Constipation   Senna 8.6mg 2 tabs twice a day  Miralax - holding  Patient is having 3-4 BM per day, typically 3 during the day and once overnight. Normal for her is 1-2 BM during the day.   Patient feels that current therapy is effective. Struggled with constipation prior.   Patient reports no current medication side effects.    Stool is soft to pass.      GERD    Omeprazole 20 mg once daily   Patient feels that current regimen is effective.  The patient does not have a history of GI bleed.  Had been prescribed pantoprazole suspension but waiting on compounding to make this. Was instructed to purchase otc omeprazole since patient can take medication whole by mouth and doesn't need via G tube. This has been working fine.        Mental Health   Anxiety  Sertraline 75mg once daily.   Patient reports no current medication side effects.  Patient reports symptoms are stable.       ESRD  Haley MWRHONDA  Has midodrine to take prior to dialysis as needed - takes to dialysis and staff administers. Doesn't think she's needed to take this lately.   Renvela - on hold while not eating    Nausea  Ondansetron ODT 4mg as needed - has been taking at dialysis,  helpful.     Today's Vitals: LMP  (LMP Unknown)   ----------------  Post Discharge Medication Reconciliation Status: discharge medications reconciled and changed, per note/orders.    I spent 75 minutes with this patient today. All changes were made via collaborative practice agreement with Noam Jackson MD.     A summary of these recommendations was sent via [place].    Lissa Dunbar, PharmD, BCACP   Medication Therapy Management Pharmacist   [facility]  [phone]          Medication Therapy Recommendations  Constipation, unspecified constipation type   1 Current Medication: sennosides (SENOKOT) 8.6 MG tablet   Current Medication Sig: Decrease senna to 2 tabs AM and 1 tab PM   Rationale: Dose too  high - Dosage too high - Safety   Recommendation: Decrease Dose   Status: Accepted per CPA   Identified Date: 4/29/2025 Completed Date: 4/29/2025         Gastroesophageal reflux disease, unspecified whether esophagitis present   1 Current Medication: pantoprazole (PROTONIX) 2 mg/mL SUSP suspension (Discontinued)   Current Medication Sig: Place 20 mLs (40 mg) into Feeding Tube every morning (before breakfast).   Rationale: More cost-effective medication available - Cost - Adherence   Recommendation: Change Medication - omeprazole 20 MG DR capsule   Status: Accepted per CPA   Identified Date: 4/29/2025 Completed Date: 4/29/2025         Type 2 diabetes mellitus with diabetic chronic kidney disease (H)   1 Current Medication: insulin glargine (LANTUS PEN) 100 UNIT/ML pen   Current Medication Sig: Inject 10 Units subcutaneously at bedtime.   Rationale: Dose too high - Dosage too high - Safety   Recommendation: Decrease Dose   Status: Accepted per CPA   Identified Date: 4/29/2025 Completed Date: 4/29/2025

## 2025-04-30 PROCEDURE — S9342 HIT ENTERAL PUMP DIEM: HCPCS

## 2025-04-30 NOTE — PATIENT INSTRUCTIONS
"Recommendations from today's MTM visit:                                                    MTM (medication therapy management) is a service provided by a clinical pharmacist designed to help you get the most of out of your medicines.   Today we reviewed what your medicines are for, how to know if they are working, that your medicines are safe and how to make your medicine regimen as easy as possible.      Continue omeprazole 20mg daily - take 1 hour prior to tube feeding    Decrease Lantus to 10 units once a day    Decrease senna to 2 tabs AM and 1 tab PM    OK to mix budesonide and formoterol - administer together twice daily    OK to administer hydralazine doses 6-8 hours apart for blood pressure >100/50    Follow-up: 1 week    It was great speaking with you today.  I value your experience and would be very thankful for your time in providing feedback in our clinic survey. In the next few days, you may receive an email or text message from [email or web address] with a link to a survey related to your  clinical pharmacist.\"     To schedule another MTM appointment, please call the clinic directly or you may call the MTM scheduling line at [phone] or toll-free at [phone].     My Clinical Pharmacist's contact information:                                                      Please feel free to contact me with any questions or concerns you have.      Lissa Dunbar, PharmD, BCACP   Medication Therapy Management Pharmacist   [facility]  [phone]    "

## 2025-05-01 ENCOUNTER — OFFICE VISIT (OUTPATIENT)
Dept: OPHTHALMOLOGY | Facility: CLINIC | Age: 75
End: 2025-05-01
Attending: OPHTHALMOLOGY
Payer: COMMERCIAL

## 2025-05-01 DIAGNOSIS — E11.3213 TYPE 2 DIABETES MELLITUS WITH MILD NONPROLIFERATIVE RETINOPATHY OF BOTH EYES AND MACULAR EDEMA, UNSPECIFIED WHETHER LONG TERM INSULIN USE (H): ICD-10-CM

## 2025-05-01 DIAGNOSIS — H04.123 DRY EYE SYNDROME OF BOTH EYES: Primary | ICD-10-CM

## 2025-05-01 PROCEDURE — 92134 CPTRZ OPH DX IMG PST SGM RTA: CPT | Performed by: OPHTHALMOLOGY

## 2025-05-01 PROCEDURE — 92235 FLUORESCEIN ANGRPH MLTIFRAME: CPT | Performed by: OPHTHALMOLOGY

## 2025-05-01 PROCEDURE — S9342 HIT ENTERAL PUMP DIEM: HCPCS

## 2025-05-01 ASSESSMENT — CONF VISUAL FIELD
METHOD: COUNTING FINGERS
OD_SUPERIOR_TEMPORAL_RESTRICTION: 0
OS_INFERIOR_NASAL_RESTRICTION: 3
OD_NORMAL: 1
OD_INFERIOR_TEMPORAL_RESTRICTION: 0
OS_INFERIOR_TEMPORAL_RESTRICTION: 3
OD_SUPERIOR_NASAL_RESTRICTION: 0
OD_INFERIOR_NASAL_RESTRICTION: 0

## 2025-05-01 ASSESSMENT — REFRACTION_WEARINGRX
OD_SPHERE: -1.00
OD_CYLINDER: +1.00
OS_AXIS: 130
OD_AXIS: 075
OS_SPHERE: -4.00
OD_ADD: +2.50
OS_CYLINDER: +2.00
OS_ADD: +2.50

## 2025-05-01 ASSESSMENT — VISUAL ACUITY
OS_CC+: -2
OD_CC+: -2
OD_CC: 20/40
METHOD: SNELLEN - LINEAR
OS_CC: 20/30
CORRECTION_TYPE: GLASSES

## 2025-05-01 ASSESSMENT — SLIT LAMP EXAM - LIDS
COMMENTS: NORMAL
COMMENTS: NORMAL

## 2025-05-01 ASSESSMENT — TONOMETRY
OS_IOP_MMHG: 14
IOP_METHOD: TONOPEN
OD_IOP_MMHG: 11

## 2025-05-01 ASSESSMENT — CUP TO DISC RATIO
OS_RATIO: 0.3
OD_RATIO: 0.3

## 2025-05-01 ASSESSMENT — EXTERNAL EXAM - RIGHT EYE: OD_EXAM: NORMAL

## 2025-05-01 NOTE — NURSING NOTE
Chief Complaints and History of Present Illnesses   Patient presents with    Diabetic Retinopathy Follow Up     Chief Complaint(s) and History of Present Illness(es)       Diabetic Retinopathy Follow Up               Comments    Patient states that with her glasses on she can see well. No pain and irritation. No flashes of light. She states that she does have floaters.     Ocular Meds:none   FBS:177 this morning   Lab Results       Component                Value               Date                       A1C                      6.5                 01/17/2025                 A1C                      6.8                 04/21/2023                 A1C                      6.2                 11/23/2021                 A1C                      6.2                 04/09/2021                 A1C                      6.0                 06/22/2018                 A1C                      5.4                 03/29/2018                 A1C                      6.8                 01/09/2018                 A1C                      9.6                 06/09/2017                Asael KELLY, May 1, 2025 1:28 PM

## 2025-05-01 NOTE — PROGRESS NOTES
CC: follow up  NPDR and DME.     HPI: 75 year old female with history of  NPDR and DME.     Interim: Patient has been in and out of hospital in January due to influenza. Has been experiencing light sensitivity while in hospital. Symptoms have been stable to improved.   Now on GI tube because history of intubation and unable to swallow  She is on dialysis    Diabetes mellitus: she is on insulin, and had some adjusts due to her hospitalization    Imaging:  Optical Coherence Tomography: 5/1/25  right eye: central stable lamellar hole. Mild  IRF, choroid normal, hyaloid . Mild Epiretinal membrane. Stable   Left eye: irregular fovea, irregular inner retinal contour, mild IRF rashid nasally, microaneurysms. stable    fluorescein angiography: 5/1/25     Right eye: blockage of fluorescein angiography on the areas of heme; microaneurysms throughout; mild late Diabetic macular edema and PP leakage.  Few capillary non perfusion, no neovascularization elsewhere   Left eye: microaneurysms; mild late Diabetic macular edema; staining of the peripheral Chorioretinal  scars. Few capillary non perfusion;  No neovascularization elsewhere; no neovascularization of the disc.    Assessment & Plan:    #DMII  Lab Results   Component Value Date    A1C 6.5 01/17/2025    A1C 6.8 04/21/2023    A1C 6.2 11/23/2021    A1C 6.2 04/09/2021    A1C 6.0 06/22/2018    A1C 5.4 03/29/2018    A1C 6.8 01/09/2018    A1C 9.6 06/09/2017     # Moderate nonproliferative diabetic retinopathy both eyes   - stable.    # mild Diabetic macular edema both eyes  - stable  observe    # H/o right eye - retinal macroaneurysm   - at the sup temp margin of disc may contribute to macular edema;   - status post avastin inj x2 for cystoid macular edema with improvement- last injection was in 2019  - No longer present- previously observed to be thrombosing  - Last DOROTEO 8/15/19 (#1)    # Mod Hypertensive retinopathy both eyes   - Stage 5 kidney disease   - blood pressure  control has been uncontrolled, with high and low pressures fluctuating with dialysis    # Pseudophakia both eyes  8-11-20 right eye   8-17-22 YAG OD    #. History of Macula hole repair left eye by Dr. Daniel  S/p PPV, mp, air-fluid exchange, SF6 gas infusion, left eye 2/14/2012 by Dr. Daniel    - residual lamellar hole, but outer retina closed  08/17/22  Peripheral Chorioretinal  Scars with  IN area of shallow elevation anterior to the scars. questionable shallow SRF with demarcation line, possible old laser and not extending posteriorly.   These are chronic findings and present in prior optos photos from 2019.   Stable, unchanged 5/1/25  Will monitor.      # Dry eye syndrome both eyes   Status post punctal plugs   artificial tears  four times a day   Artificial tear ointment at bedtime     PLAN:   - Blood pressure (<120/80) and blood glucose (HbA1c <7.0) control discussed with patient.   - Patient advised that failure to adequately control each may lead to vision loss. The patient expressed understanding.  - follow up in 6 months with Optical Coherence Tomography both eyes; no fluorescein angiography     Howard Casas MD  PGY-3 Ophthalmology Resident    ~~~~~~~~~~~~~~~~~~~~~~~~~~~~~~~~~~   Complete documentation of historical and exam elements from today's encounter can be found in the full encounter summary report (not reduplicated in this progress note).  I personally obtained the chief complaint(s) and history of present illness.  I confirmed and edited as necessary the review of systems, past medical/surgical history, family history, social history, and examination findings as documented by others; and I examined the patient myself.  I personally reviewed the relevant tests, images, and reports as documented above.  I formulated and edited as necessary the assessment and plan and discussed the findings and management plan with the patient and family    Leanne Jett MD   of  Ophthalmology.  Retina Service   Department of Ophthalmology and Visual Neurosciences   [facility]  Phone: [phone]   Fax: [phone]

## 2025-05-02 PROCEDURE — S9342 HIT ENTERAL PUMP DIEM: HCPCS

## 2025-05-03 PROCEDURE — S9342 HIT ENTERAL PUMP DIEM: HCPCS

## 2025-05-04 PROCEDURE — S9342 HIT ENTERAL PUMP DIEM: HCPCS

## 2025-05-05 PROCEDURE — S9342 HIT ENTERAL PUMP DIEM: HCPCS

## 2025-05-06 ENCOUNTER — PATIENT OUTREACH (OUTPATIENT)
Dept: GERIATRIC MEDICINE | Facility: CLINIC | Age: 75
End: 2025-05-06

## 2025-05-06 ENCOUNTER — OFFICE VISIT (OUTPATIENT)
Dept: ORTHOPEDICS | Facility: CLINIC | Age: 75
End: 2025-05-06
Payer: COMMERCIAL

## 2025-05-06 DIAGNOSIS — B35.1 OM (ONYCHOMYCOSIS): ICD-10-CM

## 2025-05-06 DIAGNOSIS — I73.9 PVD (PERIPHERAL VASCULAR DISEASE): Primary | ICD-10-CM

## 2025-05-06 DIAGNOSIS — L84 TYLOMA: ICD-10-CM

## 2025-05-06 DIAGNOSIS — I87.321 CHRONIC VENOUS HYPERTENSION (IDIOPATHIC) WITH INFLAMMATION OF RIGHT LOWER EXTREMITY: ICD-10-CM

## 2025-05-06 DIAGNOSIS — I73.89 OTHER SPECIFIED PERIPHERAL VASCULAR DISEASES: ICD-10-CM

## 2025-05-06 PROCEDURE — S9342 HIT ENTERAL PUMP DIEM: HCPCS

## 2025-05-06 NOTE — LETTER
5/6/2025      Supriya Herr  [address]  [place] [address]      Dear Colleague,    Thank you for referring your patient, Supriya Herr, to the [facility] [place]. Please see a copy of my visit note below.    Chief Complaint   Patient presents with     Follow Up     Foot check.             Allergies   Allergen Reactions     Ampicillin-Sulbactam Sodium Rash     No evidence SJS, but very uncomfortable and precipitated multiple provider visits. Would not use penicillins again if other options available.      Penicillins Rash         Subjective: Supriya is a 75 year old female who presents to the clinic today for a diabetic foot exam and management.  Her nails do get long.  she has a history of amputation and from previous notes, we have documented that she has nonpalpable DP and PT pulses.    Interval history: She presents today with her daughter.  They relate that she has moved into her daughter's house.  She has had a significant history over the last few months.  She has been hospitalized multiple times from COVID-19, renal disease, dyspnea.  This has been a difficult time for her and her daughter.  She notes that she does not have pain in her feet.     Objective    Hemoglobin A1C   Date Value Ref Range Status   01/17/2025 6.5 (H) <5.7 % Final     Comment:     Normal <5.7%   Prediabetes 5.7-6.4%    Diabetes 6.5% or higher     Note: Adopted from ADA consensus guidelines.   04/09/2021 6.2 (H) 0 - 5.6 % Final     Comment:     Normal <5.7% Prediabetes 5.7-6.4%  Diabetes 6.5% or higher - adopted from ADA   consensus guidelines.           Non-palpable DP and PT pulses L.   Equinus noted BL. Pes planus with rigid toe deformities noted to lesser digits on the right. Left AKA noted.   Nails are thickened, discolored, elongated, with subungual debris consistent with onychomycosis.          Assessment: DM2 with left AKA and neuropathy. She has severe vasculopathy.  Onychomycosis.    Tyloma     Plan:   - Pt seen and evaluated  - Nails debrided x 5.  - Tyloma debrided x 1  - Cont compression socks.  - See again in 3 months.      Again, thank you for allowing me to participate in the care of your patient.        Sincerely,        Eleazar Pack DPM    Electronically signed

## 2025-05-06 NOTE — PROGRESS NOTES
Outcome for 05/06/25 10:23 AM: e-SENS message sent  Jaleel Avery MA  Outcome for 05/15/25 1:39 PM: Pt is unable to upload from home. Avery data sent via [employer].   Jud Cabrera MA    Patient is showing 4/5 MNCM met. BP out range   Jud Cabrera MA

## 2025-05-06 NOTE — PROGRESS NOTES
[facility] Care Coordination Contact    4/30/25- Call placed to [facility] to follow up on EW open.  [facility] state they are waiting on a physician certification form or 1503 from [facility] prior to opening EW.    4/30/25- Email to HIGINIO Wilcox([facility]) requesting assistance obtaining document.   5/5/25- Follow up email to email to HIGINIO Wilcox([facility]) requesting assistance obtaining document.   5/6/25- Left voicemail for [facility] requesting assistance obtaining physician certification.    Ellen Guerrero RN, BSN, PHN  [facility]  [phone]  Fax: [phone]

## 2025-05-06 NOTE — PROGRESS NOTES
Chief Complaint   Patient presents with    Follow Up     Foot check.             Allergies   Allergen Reactions    Ampicillin-Sulbactam Sodium Rash     No evidence SJS, but very uncomfortable and precipitated multiple provider visits. Would not use penicillins again if other options available.     Penicillins Rash         Subjective: Supriya is a 75 year old female who presents to the clinic today for a diabetic foot exam and management.  Her nails do get long.  she has a history of amputation and from previous notes, we have documented that she has nonpalpable DP and PT pulses.    Interval history: She presents today with her daughter.  They relate that she has moved into her daughter's house.  She has had a significant history over the last few months.  She has been hospitalized multiple times from COVID-19, renal disease, dyspnea.  This has been a difficult time for her and her daughter.  She notes that she does not have pain in her feet.     Objective    Hemoglobin A1C   Date Value Ref Range Status   01/17/2025 6.5 (H) <5.7 % Final     Comment:     Normal <5.7%   Prediabetes 5.7-6.4%    Diabetes 6.5% or higher     Note: Adopted from ADA consensus guidelines.   04/09/2021 6.2 (H) 0 - 5.6 % Final     Comment:     Normal <5.7% Prediabetes 5.7-6.4%  Diabetes 6.5% or higher - adopted from ADA   consensus guidelines.           Non-palpable DP and PT pulses L.   Equinus noted BL. Pes planus with rigid toe deformities noted to lesser digits on the right. Left AKA noted.   Nails are thickened, discolored, elongated, with subungual debris consistent with onychomycosis.          Assessment: DM2 with left AKA and neuropathy. She has severe vasculopathy.  Onychomycosis.   Tyloma     Plan:   - Pt seen and evaluated  - Nails debrided x 5.  - Tyloma debrided x 1  - Cont compression socks.  - See again in 3 months.

## 2025-05-07 PROCEDURE — S9342 HIT ENTERAL PUMP DIEM: HCPCS

## 2025-05-08 ENCOUNTER — VIRTUAL VISIT (OUTPATIENT)
Dept: PHARMACY | Facility: CLINIC | Age: 75
End: 2025-05-08
Payer: COMMERCIAL

## 2025-05-08 ENCOUNTER — VIRTUAL VISIT (OUTPATIENT)
Dept: FAMILY MEDICINE | Facility: CLINIC | Age: 75
End: 2025-05-08
Payer: COMMERCIAL

## 2025-05-08 DIAGNOSIS — Z99.3 WHEELCHAIR DEPENDENCE: ICD-10-CM

## 2025-05-08 DIAGNOSIS — L89.302 PRESSURE INJURY OF BUTTOCK, STAGE 2, UNSPECIFIED LATERALITY (H): Primary | ICD-10-CM

## 2025-05-08 DIAGNOSIS — Z99.2 ESRD ON HEMODIALYSIS (H): ICD-10-CM

## 2025-05-08 DIAGNOSIS — K59.00 CONSTIPATION, UNSPECIFIED CONSTIPATION TYPE: ICD-10-CM

## 2025-05-08 DIAGNOSIS — E11.9 TYPE 2 DIABETES, HBA1C GOAL < 8% (H): Primary | ICD-10-CM

## 2025-05-08 DIAGNOSIS — N18.6 ESRD ON HEMODIALYSIS (H): ICD-10-CM

## 2025-05-08 PROCEDURE — S9342 HIT ENTERAL PUMP DIEM: HCPCS

## 2025-05-08 RX ORDER — SENNOSIDES 8.6 MG
1 TABLET ORAL 2 TIMES DAILY PRN
Status: SHIPPED
Start: 2025-05-08

## 2025-05-08 RX ORDER — INSULIN ASPART 100 [IU]/ML
4 INJECTION, SOLUTION INTRAVENOUS; SUBCUTANEOUS 3 TIMES DAILY PRN
COMMUNITY

## 2025-05-08 NOTE — PROGRESS NOTES
"Supriya is a 75 year old who is being evaluated via a billable telephone visit.    What phone number would you like to be contacted at? Cell daughter  How would you like to obtain your AVS? Theodorahart  Originating Location (pt. Location): Home    Distant Location (provider location):  On-site  Telephone visit completed due to the patient did not have access to video, while the distant provider did.    Assessment & Plan     Pressure injury of buttock, stage 2, unspecified laterality (H)  New   Discussed skin cares/ keep pressure off of area( has to go to dialysis so hard to do)  - Home Care Referral  - Wound Care Referral; Future    Wheelchair bound  As above   Use foam/  pad/ keep pressure off of area    ESRD on hemodialysis (H)  Follow up with consultant as planned.         MED REC REQUIRED  Post Medication Reconciliation Status:     BMI  Estimated body mass index is 29.75 kg/m  as calculated from the following:    Height as of 3/27/25: 1.676 m (5' 6\").    Weight as of 4/16/25: 83.6 kg (184 lb 4.9 oz).         Follow-up withconsulagnt as planned      Subjective   Supriya is a 75 year old, presenting for the following health issues:  No chief complaint on file.    Encounter Diagnoses   Name Primary?    Pressure injury of buttock, stage 2, unspecified laterality (H) Yes    Wheelchair bound     ESRD on hemodialysis (H)       HPI  My mom has a new wound on her bottom. Sending a picture along wondering, if we need a wound care visit I'm guessing we do. And by chance if there's anyone that would come to the house per her alliance. It's home care that she's currently under. Please let me know what you would think our next steps are.             Review of Systems  Constitutional, HEENT, cardiovascular, pulmonary, gi and gu systems are negative, except as otherwise noted.      Objective           Vitals:  No vitals were obtained today due to virtual visit.    Physical Exam   General: Alert and no distress //Respiratory: No " audible wheeze, cough, or shortness of breath // Psychiatric:  Appropriate affect, tone, and pace of words      Reveiwed photos  Lab Requisition on 04/25/2025   Component Date Value Ref Range Status    Potassium 04/25/2025 4.7  3.4 - 5.3 mmol/L Final    Hemoglobin 04/25/2025 10.9 (L)  11.7 - 15.7 g/dL Final    Urea Nitrogen 04/25/2025 77.2 (H)  8.0 - 23.0 mg/dL Final    Urea Nitrogen 04/25/2025 18.0  8.0 - 23.0 mg/dL Final         Phone call duration: 15 minutes  Signed Electronically by: Noam Jackson MD

## 2025-05-08 NOTE — PROGRESS NOTES
Medication Therapy Management (MTM) Encounter    ASSESSMENT:                            Medication Adherence/Access: No issues identified.    Diabetes  Patient is meeting A1c goal of < 8%.  Patient is meeting goal of > 50% time in target with continuous glucose monitoring.   Patient would benefit from OK to continue PRN Novolog 4 units for high carbohydrate intake.      constipation:   Reduce senna to 1 tab daily AM and 1 additional tablet PRN in PM    PLAN:                            OK to continue current doses of insulin    Lantus 15 units daily  Novolog 4 units for high carbohydrate meal or glucose >200. Give doses no closer than 6 hours apart.     Decrease senna to 1 tablet daily in AM and 1 tablet in PM as needed    Follow-up: 1 week on MyChart    SUBJECTIVE/OBJECTIVE:                          Supriya Herr is a 75 year old female seen for a follow-up visit. Patient was accompanied by her daughter Caroline.      Reason for visit: diabetes recheck.    Allergies/ADRs: Reviewed in chart  Past Medical History: Reviewed in chart  Tobacco: She reports that she quit smoking about 7 years ago. Her smoking use included cigarettes. She started smoking about 61 years ago. She has a 26.9 pack-year smoking history. She has never used smokeless tobacco.  Alcohol: not currently using    Medication Adherence/Access: no issues reported.    Diabetes   Insulin glargine 15 units (increased 5 days ago)  Insulin aspart 4 units as needed, has directions to use 4 units with meals    Current diabetes symptoms: hypoglycemia NO SYMPTOMS  Diet/Exercise: 12 hour feeding tube, weaning  She is more hungry now, eating more. Breakfast around 11am, eggs or egg sandwich.   Working with nutritionist at dialysis as well.   Dinner last night- turkey sandwich, veggie straws, crackers     Blood sugar monitoring: Continuous Glucose Monitor Average glucose Last 7 Days - 161mg/dl  12a-6a, 6a-12p, 12p-6p, 6p-12a; 164, 167, 154, 154  Time in Range Last 7  Days - 72%, Above (>180mg/dl) 28%, Below (<70mg/dl) 0%  Eye exam is up to date  Foot exam is up to date    Pre meal - 143, 134, 106  After dinner yesterday 208    Constipation  Senna 8.6mg 2 tabs once a day (decreased dose 2 days ago per RN)  Miralax - holding  Patient is still having very soft stool and increased frequency.   Struggled with constipation prior.   Patient reports no current medication side effects.      Today's Vitals: LMP  (LMP Unknown)   ----------------  Post Discharge Medication Reconciliation Status: medication reconcilation previously completed during another office visit.    I spent 30 minutes with this patient today. All changes were made via collaborative practice agreement with Noam Jackson MD.     A summary of these recommendations was sent via [employer].    Lissa Dunbar, PharmD, BCACP   Medication Therapy Management Pharmacist   [facility]  [phone]     Telemedicine Visit Details  The patient's medications can be safely assessed via a telemedicine encounter.  Type of service:  Telephone visit  Originating Location (pt. Location): Home    Distant Location (provider location):  On-site  Start Time: 1:08 PM  End Time: 1:42 PM     Medication Therapy Recommendations  No medication therapy recommendations to display

## 2025-05-08 NOTE — PATIENT INSTRUCTIONS
"Recommendations from today's MTM visit:                                                         OK to continue current doses of insulin    Lantus 15 units daily  Novolog 4 units for high carbohydrate meal or glucose >200. Give doses no closer than 6 hours apart - if you are giving closer than that, consider lower dose of 2 units.     Follow-up:  please send glucose update on [employer] next week    It was great speaking with you today.  I value your experience and would be very thankful for your time in providing feedback in our clinic survey. In the next few days, you may receive an email or text message from [employer] with a link to a survey related to your  clinical pharmacist.\"     To schedule another MTM appointment, please call the clinic directly or you may call the MTM scheduling line at [phone] or toll-free at [phone].     My Clinical Pharmacist's contact information:                                                      Please feel free to contact me with any questions or concerns you have.      Lissa Dunbar, PharmD, BCACP   Medication Therapy Management Pharmacist   [facility]  [phone]    "

## 2025-05-09 PROCEDURE — S9342 HIT ENTERAL PUMP DIEM: HCPCS

## 2025-05-10 PROCEDURE — S9342 HIT ENTERAL PUMP DIEM: HCPCS

## 2025-05-11 PROCEDURE — S9342 HIT ENTERAL PUMP DIEM: HCPCS

## 2025-05-12 PROCEDURE — S9342 HIT ENTERAL PUMP DIEM: HCPCS

## 2025-05-13 PROCEDURE — S9342 HIT ENTERAL PUMP DIEM: HCPCS

## 2025-05-13 NOTE — PROGRESS NOTES
[facility] Care Coordination Contact    [facility] Early Reassessment     Home visit for Change in Condition Health Risk Assessment with Supriya Herr completed on April 21, 2025    Reason for Early reassessment: Request for Elderly Waiver services Yes, if yes explain was out of community for greater than 30 days, EW closed at that time. Is discharging back to the community and will need EW supplies, DME and services.     Type of residence:: Private home - no stairs  Current living arrangement:: I live in a private home with family on the main level. Lives with daughter Caroline and her family in daughter's home.      Assessment completed with:: Children- daughter Caroline, Patient    Current Care Plan  Member currently receiving the following home care services: Physical Therapy, Occupational Therapy, Skilled Nursing   Member currently receiving the following community resources: DME, Home Infusion, [facility], PCA, OP Dialysis, Transportation Services      Medication Review  Medication reconciliation completed in Epic: Yes  Medication set-up & administration: Family/informal caregiver sets up weekly.  Family caregiver administers medications.  Medication Risk Assessment Medication (1 or more, place referral to MTM): Recent transition (IP/TCU within last 30 days)  MTM Referral Placed:  MTM visit 5/8/25    Mental/Behavioral Health   Depression Screening:   PHQ-2 Total Score (Adult) - Positive if 3 or more points; Administer PHQ-9 if positive: 0        Mental health DX:: Yes   Mental health DX how managed:: Medication    Falls Assessment:   Fallen 2 or more times in the past year?: No   Any fall with injury in the past year?: No    ADL/IADL Dependencies:   Dependent ADLs:: Wheelchair-with assist, Bathing, Dressing, Eating, Grooming, Incontinence, Toileting, Transfers, Positioning  Dependent IADLs:: Cleaning, Cooking, Laundry, Shopping, Medication Management, Transportation, Meal Preparation, Money Management,  Incontinence    Health [facility] sponsored benefits: [facility]: Shared information regarding [employer] Program. Reviewed preventative health screening and health plan supplemental benefits/incentives. Reviewed medication disposal form.    CFSS Assessment completed at visit: Yes Annual CFSS assessment indicated 13 hours per day of CFSS. This is an increase from the previous assessment.      Elderly Waiver Eligibility: Yes-will open to william CROCKER    Care Plan & Recommendations: Supriya discharge back to her home where she lives with her daughter and daughter's family on 4/18 after an extended hospital/TCU stay.  Daughter is  main caregiver/PCA. Daughter works from home and is able to provide care to Supryia.  [facility] home infusion tube feeding management. [facility] for SN, PT and OT.  [facility] [place]. CC to continue to work with home care for DME and supplies as needed. PCA only formal service requested at this time.  No concerns identified.     See [place] Assessment for detailed assessment information.    Follow-Up Plan: Member informed of future contact, plan to f/u with member with a 6 month telephone assessment.  Contact information shared with member and family, encouraged member to call with any questions or concerns at any time.    [facility] care continuum providers: Please see Snapshot and Care Management Flowsheets for Specific details of care plan.    This CC note routed to PCP, Noam Jackson.     Ellen Guerrero RN, BSN, PHN  [facility]  [phone]  Fax: [phone]

## 2025-05-14 ENCOUNTER — PATIENT OUTREACH (OUTPATIENT)
Dept: GERIATRIC MEDICINE | Facility: CLINIC | Age: 75
End: 2025-05-14
Payer: COMMERCIAL

## 2025-05-14 PROCEDURE — S9342 HIT ENTERAL PUMP DIEM: HCPCS

## 2025-05-14 NOTE — LETTER
May 14, 2025    BROOKE ARANA  [address]  [place] [address]      Negraisrael Epps,    I hope you ve been well since we last spoke. Attached is your copy of your personalized Support Plan which summarizes our conversation.   My goal is to help you keep your health on track. Your Support Plan includes the steps we agreed would help you with that. We can talk about these steps during our next meeting or sooner if you d like.   Here are additional programs and services I can help you with:    Get to appointments with [facility]    If you need a ride to medical or dental visits, [facility] can help. Call to schedule a ride. 0 [phone] (TTY:[phone]), 8 a.m. - 6 p.m. CT, Monday - Friday.    Access One-Pass to boost your health    Get a gym membership, at-home fitness classes, and free access to fun activities that help your brain. To get access, go to [employer] or call One-Pass.   9 [phone] (TTY:[phone]), 8 a.m. - 9 p.m. CT, Monday - Friday      Set up your health care directive  A directive is a legal form that explains your health care wishes. You can request this form from me, and I ll walk you through it before you discuss your plans with your doctor.    Schedule your annual physical  I can help set up your annual physical at your clinic of choice. It's an important step towards good health.      Have questions? I m here to help.  Call me at [phone] (TTY:[phone]) 8am - 5pm, Monday - Friday.  I ll reach out to you again in 6 months to follow up via telephone.  Warm regards,      Ellen Guerrero RN, PHN    E-mail: [email or web address]  Phone: [phone]      [facility]    cc: member records                                                                    [id number]

## 2025-05-14 NOTE — PROGRESS NOTES
[facility] Care Coordination Contact    Received after visit chart from care coordinator.  Completed following tasks: Mailed copy of support plan to member, Mailed [place] Choices signature sheet pages 3-4, Mailed Safe Medication Disposal , Mailed [employer] Leave Behind Letter, Submitted referrals/auths for DME/supplies, and Updated services in Database.Uploaded consent to communicate form(s) to [facility]    , Provider Signature - No Support Plan Shared:  Member indicates that they do not want their support plan shared with any EW providers.    , and Order placed with [facility] (p: [phone]; f: [phone]) for barrier cream, maciel wash, wipes, chucks, golves, briefs & a bed pan.  Order placed on 5/13/25. Database updated.  As required, authorization submitted to health plan.    Order placed with [facility] (p: [phone]; f: [phone]) for disposable washcloths, wound therapy solution & mattress overly.  Order placed on 5/13/25. Database updated.  As required, authorization submitted to health plan.    Leslie Braun  Care Management Specialist   [facility]   [phone]

## 2025-05-15 PROCEDURE — S9342 HIT ENTERAL PUMP DIEM: HCPCS

## 2025-05-16 PROCEDURE — S9342 HIT ENTERAL PUMP DIEM: HCPCS

## 2025-05-17 PROCEDURE — B9002 ENTER NUTR INF PUMP ANY TYPE: HCPCS | Mod: RR

## 2025-05-17 PROCEDURE — S9342 HIT ENTERAL PUMP DIEM: HCPCS

## 2025-05-18 PROCEDURE — S9342 HIT ENTERAL PUMP DIEM: HCPCS

## 2025-05-19 PROCEDURE — S9342 HIT ENTERAL PUMP DIEM: HCPCS

## 2025-05-20 ENCOUNTER — VIRTUAL VISIT (OUTPATIENT)
Dept: ENDOCRINOLOGY | Facility: CLINIC | Age: 75
End: 2025-05-20
Payer: COMMERCIAL

## 2025-05-20 DIAGNOSIS — E11.9 TYPE 2 DIABETES, HBA1C GOAL < 8% (H): ICD-10-CM

## 2025-05-20 PROCEDURE — 98007 SYNCH AUDIO-VIDEO EST HI 40: CPT | Performed by: STUDENT IN AN ORGANIZED HEALTH CARE EDUCATION/TRAINING PROGRAM

## 2025-05-20 PROCEDURE — S9342 HIT ENTERAL PUMP DIEM: HCPCS

## 2025-05-20 PROCEDURE — 1126F AMNT PAIN NOTED NONE PRSNT: CPT | Mod: 95 | Performed by: STUDENT IN AN ORGANIZED HEALTH CARE EDUCATION/TRAINING PROGRAM

## 2025-05-20 PROCEDURE — G2211 COMPLEX E/M VISIT ADD ON: HCPCS | Performed by: STUDENT IN AN ORGANIZED HEALTH CARE EDUCATION/TRAINING PROGRAM

## 2025-05-20 RX ORDER — BLOOD-GLUCOSE SENSOR
EACH MISCELLANEOUS
Qty: 6 EACH | Refills: 3 | Status: SHIPPED | OUTPATIENT
Start: 2025-05-20

## 2025-05-20 RX ORDER — INSULIN ASPART 100 [IU]/ML
INJECTION, SOLUTION INTRAVENOUS; SUBCUTANEOUS
Qty: 15 ML | Refills: 3 | Status: SHIPPED | OUTPATIENT
Start: 2025-05-20
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
## 2025-05-20 NOTE — NURSING NOTE
Current patient location: [address] [place] [id number]    Is the patient currently in the state of [place]? YES    Visit mode: VIDEO    If the visit is dropped, the patient can be reconnected by:VIDEO VISIT: Text to cell phone:   Telephone Information:   Mobile [phone]       Will anyone else be joining the visit? NO  (If patient encounters technical issues they should call [phone]    Are changes needed to the allergy or medication list? No and Pt stated no med changes    Are refills needed on medications prescribed by this physician? NO    Rooming Documentation:  Not applicable    Reason for visit: RECHECK    Emmy STEWART
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
## 2025-05-20 NOTE — PATIENT INSTRUCTIONS
- To make sure to get fixed dose of Lantus 10 units dailty while receiving the tube feed.   After switching from the tube feed to the oral formula to reduce the dose to 8 unitts daily if she is getting one serving /day , if they increase it to two cartoons/serving /day to increase lantus back to 10 units     To use the following corrections scale not more frequent than every 6 hours :    Blood sugar      Novolog doses   <200                   0 units   200-250              1 unit   251-300              2 units  301-350              3 units  351-400             4 units   >400                   5 units

## 2025-05-21 PROCEDURE — S9342 HIT ENTERAL PUMP DIEM: HCPCS

## 2025-05-22 ENCOUNTER — TELEPHONE (OUTPATIENT)
Dept: FAMILY MEDICINE | Facility: CLINIC | Age: 75
End: 2025-05-22
Payer: COMMERCIAL

## 2025-05-22 PROCEDURE — S9342 HIT ENTERAL PUMP DIEM: HCPCS

## 2025-05-22 NOTE — TELEPHONE ENCOUNTER
Forms/Letter Request    Type of form/letter: Standard Written Order     Who is the form from? [employer]    Where did/will the form come from? Form was faxed in    When is form/letter needed by: ASAP    How would you like the form/letter returned: Faxed to [phone]    Where form is located: [facility] to be signed basket    Patient Notified form requests are processed in 5-7 business days: N/A    Could we send this information to you in [facility] or would you prefer to receive a phone call?: N/A    Additional Information: N/A

## 2025-05-23 PROCEDURE — S9342 HIT ENTERAL PUMP DIEM: HCPCS

## 2025-05-24 PROCEDURE — S9342 HIT ENTERAL PUMP DIEM: HCPCS

## 2025-05-25 PROCEDURE — S9342 HIT ENTERAL PUMP DIEM: HCPCS

## 2025-05-26 PROCEDURE — S9342 HIT ENTERAL PUMP DIEM: HCPCS

## 2025-05-27 ENCOUNTER — HOSPITAL ENCOUNTER (OUTPATIENT)
Dept: WOUND CARE | Facility: CLINIC | Age: 75
Discharge: HOME | End: 2025-05-27
Attending: FAMILY MEDICINE | Admitting: FAMILY MEDICINE
Payer: COMMERCIAL

## 2025-05-27 VITALS — BODY MASS INDEX: 30.04 KG/M2 | HEIGHT: 66 IN | WEIGHT: 186.9 LBS

## 2025-05-27 DIAGNOSIS — L89.322 PRESSURE INJURY OF LEFT ISCHIUM, STAGE 2 (H): Primary | ICD-10-CM

## 2025-05-27 DIAGNOSIS — L89.302 PRESSURE INJURY OF BUTTOCK, STAGE 2, UNSPECIFIED LATERALITY (H): ICD-10-CM

## 2025-05-27 PROCEDURE — S9342 HIT ENTERAL PUMP DIEM: HCPCS

## 2025-05-27 PROCEDURE — 99214 OFFICE O/P EST MOD 30 MIN: CPT | Performed by: FAMILY MEDICINE

## 2025-05-27 PROCEDURE — G0463 HOSPITAL OUTPT CLINIC VISIT: HCPCS | Mod: 25

## 2025-05-27 PROCEDURE — 97602 WOUND(S) CARE NON-SELECTIVE: CPT

## 2025-05-27 NOTE — PROGRESS NOTES
Patient arrived for wound care visit. Certified Wound Care Nurse time spent evaluating patient record, completed a full evaluation and documented wound(s) & maciel-wound skin; provided recommendation based on treatment plan. Applied dressing, reviewed discharge instructions, patient education, and discussed plan of care with appropriate medical team staff members and patient and/or family members.
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
## 2025-05-27 NOTE — DISCHARGE INSTRUCTIONS
05/27/2025   Supriya Herr   1950    A DME order was not completed because the supplies are ordered by home care or at a care facility    Dressing changes outside of clinic are being performed by Home Care    [facility] Phone [phone] Fax [phone]    Plan 05/27/2025   -Stop the medihoney  -On days you are not at Dialysis, only up in the chair for three hours at a time then return to bed for an hour before getting back up in your chair  -Utilize your Roho cushion only and avoid any pillows or neck pillows as this can cause pressure points  -Some air was released from your Roho cushion today as there was too much air in the cushion which can make it uncomfortable to sit on  -When at dialysis or in any other chair, move your Roho cushion onto those chairs   -Continue to use the airflow mattress at home. Having your bed at home 30 degrees or less is best to avoid sliding down in your bed and reducing pressure on the wounds.    Wound Dressing Change: Midline Gluteal  - Wash your hands with soap and water before you begin your dressing change and prepare a clean surface for dressings.  - Cleanse with mild unscented soap and water (such as Cetaphil, Cerave or Dove) or Wound Cleanser  -Apply small amount of VASHE on gauze, lay into wound bed, let sit for 10 minutes, remove gauze (do not rinse)  -Pat the area dry  - Secondary dressing: Cover with 1 6x6 Zetuvit silicone plus border  Change dressing Three times a week and as needed for soilage/leakage    Wound Dressing Change: Left IT   - Wash your hands with soap and water before you begin your dressing change and prepare a clean surface for dressings.  - Cleanse with mild unscented soap and water (such as Cetaphil, Cerave or Dove) or Wound Cleanser  -Apply small amount of VASHE on gauze, lay into wound bed, let sit for 10 minutes, remove gauze (do not rinse)  -Pat the area dry  -Secondary dressing: Cover with 1 4x4 Zetuvit silicone plus border  Change  dressing Three times a week and as needed for soilage/leakage  You do not need to change the dressing on the days you are being seen at the wound clinic      A diet high in protein is important for wound healing, we recommend getting 90 grams of protein per day (unless directed otherwise to limit your protein intake). Taking protein shakes or bars are a good way to get extra protein in your diet.     Good sources of protein:  Pork 26g per 3 oz  Whey protein powder - 24g per scoop (on average)  Greek yogurt - 23g per 8oz   Chicken or Turkey - 23g per 3oz  Fish - 20-25g per 3oz  Beef - 18-23g per 3oz  Tofu - 10g per 1/2 cup  Navy beans - 20g per cup  Cottage cheese - 14g per 1/2 cup   Lentils - 13g per 1/4 cup  Beef jerky 13g per 1oz  2% milk - 8g per cup  Peanut butter - 8g per 2 tablespoons  Eggs - 6g per egg  Mixed nuts - 6g per 2oz       Main Provider: Milton Daugherty M.D. May 27, 2025    Call us at [phone] if you have any questions about your wounds, if you have redness or swelling around your wound, have a fever of 101 degrees Fahrenheit or greater or if you have any other problems or concerns. We answer the phone Monday through Friday 8 am to 4 pm, please leave a message as we check the voicemail frequently throughout the day. If you have a concern over the weekend, please leave a message and we will return your call Monday. If the need is urgent, go to the ER or urgent care.    If you had a positive experience please indicate that on your patient satisfaction survey form that [facility] will be sending you.    It was a pleasure meeting with you today.  Thank you for allowing me and my team the privilege of caring for you today.  YOU are the reason we are here, and I truly hope we provided you with the excellent service you deserve.  Please let us know if there is anything else we can do for you so that we can be sure you are leaving completely satisfied with your care experience.      If you  have any billing related questions please call the [facility] Business office at [phone]. The clinic staff does not handle billing related matters.    If you are scheduled to have a follow up appointment, you will receive a reminder call the day before your visit. On the appointment day please arrive 15 minutes prior to your appointment time. If you are unable to keep that appointment, please call the clinic to cancel or reschedule. If you are more than 10 minutes late or greater for your scheduled appointment time, the clinic policy is that you may be asked to reschedule.

## 2025-05-28 ENCOUNTER — TELEPHONE (OUTPATIENT)
Dept: FAMILY MEDICINE | Facility: CLINIC | Age: 75
End: 2025-05-28

## 2025-05-28 ENCOUNTER — TELEPHONE (OUTPATIENT)
Dept: WOUND CARE | Facility: CLINIC | Age: 75
End: 2025-05-28
Payer: COMMERCIAL

## 2025-05-28 ENCOUNTER — TELEPHONE (OUTPATIENT)
Dept: FAMILY MEDICINE | Facility: CLINIC | Age: 75
End: 2025-05-28
Payer: COMMERCIAL

## 2025-05-28 DIAGNOSIS — N18.6 ESRD ON HEMODIALYSIS (H): ICD-10-CM

## 2025-05-28 DIAGNOSIS — Z99.2 ESRD ON HEMODIALYSIS (H): ICD-10-CM

## 2025-05-28 DIAGNOSIS — L89.302 PRESSURE INJURY OF BUTTOCK, STAGE 2, UNSPECIFIED LATERALITY (H): Primary | ICD-10-CM

## 2025-05-28 PROCEDURE — S9342 HIT ENTERAL PUMP DIEM: HCPCS

## 2025-05-28 NOTE — ADDENDUM NOTE
Encounter addended by: Carly Curtis RN on: 5/28/2025 10:54 AM   Actions taken: Visit diagnoses modified, Order list changed, Diagnosis association updated, Edited Discharge Instructions

## 2025-05-28 NOTE — ADDENDUM NOTE
Encounter addended by: Milton Daugherty MD on: 5/28/2025 10:56 AM   Actions taken: Clinical Note Signed

## 2025-05-28 NOTE — TELEPHONE ENCOUNTER
Spoke with Ellen with [facility] who would like to order supplies through [facility] as home cares supplier is low on supplies and takes a long time to sent supplies out. Will send out an prescription to Ellen and ultimately will be up to [facility] if they will fullfill the order or not as patient has a medica plan and home care.     Will fax to Maximiliano Fax: [phone]

## 2025-05-28 NOTE — TELEPHONE ENCOUNTER
Care coordination calling to see if provider would be will to order Nepro (liquid) Nutritional supplement. Pt takes 2 per day.     Care coordination will fax to dme once the order has been placed. Thanks!!    Dionne Lei RN  [facility]

## 2025-05-28 NOTE — TELEPHONE ENCOUNTER
Forms/Letter Request    Type of form/letter: Change Order - Wound Care Protocol     Who is the form from? [facility]    Where did/will the form come from? Form was faxed in     When is form/letter needed by: ASAP    How would you like the form/letter returned: Faxed to [phone]    Where form is located: Renetta to sign basket    Patient Notified form requests are processed in 5-7 business days: N/A    Could we send this information to you in Any.DO or would you prefer to receive a phone call?: N/A    Additional Information: N/A

## 2025-05-28 NOTE — TELEPHONE ENCOUNTER
[facility]    Who is the name of the provider?:  Dat    What is the location you see this provider at/preferred location?: Wound Healing [facility]  Person calling / Facility: Ellen WHEELER [facility] Care Coordinator  Phone number:  [phone]   Nurse call back needed:  YES   Can we leave a detailed message on this number?  YES     Reason for call:  Because home care supplier is low on wound care supplies, she is requesting an order so she can place the order for supplies.     Pharmacy location:  NA

## 2025-06-02 ENCOUNTER — LAB REQUISITION (OUTPATIENT)
Dept: LAB | Facility: CLINIC | Age: 75
End: 2025-06-02

## 2025-06-02 ENCOUNTER — PATIENT OUTREACH (OUTPATIENT)
Dept: GERIATRIC MEDICINE | Facility: CLINIC | Age: 75
End: 2025-06-02
Payer: COMMERCIAL

## 2025-06-02 LAB
HGB BLD-MCNC: 11.2 G/DL (ref 11.7–15.7)
MCV RBC AUTO: 95 FL (ref 78–100)

## 2025-06-02 PROCEDURE — 85018 HEMOGLOBIN: CPT | Performed by: INTERNAL MEDICINE

## 2025-06-02 NOTE — PROGRESS NOTES
[facility] Care Coordination Contact    Called member daughter  Caroline Gray and left a voice mail to remind member to schedule dental appt. If member requires assistance with scheduling in message advised to call CHW.      Provided this CHW contact information.      MOI Desir  [facility]  [phone]

## 2025-06-04 ENCOUNTER — TELEPHONE (OUTPATIENT)
Dept: FAMILY MEDICINE | Facility: CLINIC | Age: 75
End: 2025-06-04

## 2025-06-04 ENCOUNTER — TELEPHONE (OUTPATIENT)
Dept: WOUND CARE | Facility: CLINIC | Age: 75
End: 2025-06-04
Payer: COMMERCIAL

## 2025-06-04 DIAGNOSIS — L89.302 PRESSURE INJURY OF BUTTOCK, STAGE 2, UNSPECIFIED LATERALITY (H): ICD-10-CM

## 2025-06-04 DIAGNOSIS — J96.00 ACUTE RESPIRATORY FAILURE, UNSPECIFIED WHETHER WITH HYPOXIA OR HYPERCAPNIA (H): ICD-10-CM

## 2025-06-04 DIAGNOSIS — L89.302 PRESSURE INJURY OF BUTTOCK, STAGE 2, UNSPECIFIED LATERALITY (H): Primary | ICD-10-CM

## 2025-06-04 DIAGNOSIS — N18.5 CHRONIC KIDNEY DISEASE, STAGE 5 (H): ICD-10-CM

## 2025-06-04 DIAGNOSIS — N18.6 ESRD ON HEMODIALYSIS (H): ICD-10-CM

## 2025-06-04 DIAGNOSIS — Z46.59 ENCOUNTER FOR CARE RELATED TO FEEDING TUBE: ICD-10-CM

## 2025-06-04 DIAGNOSIS — Z99.2 ESRD ON HEMODIALYSIS (H): ICD-10-CM

## 2025-06-04 DIAGNOSIS — L89.322 PRESSURE INJURY OF LEFT ISCHIUM, STAGE 2 (H): Primary | ICD-10-CM

## 2025-06-04 RX ORDER — SODIUM CHLOR/HYPOCHLOROUS ACID 0.033 %
5 SOLUTION, IRRIGATION IRRIGATION
Qty: 1000 ML | Refills: 3 | Status: SHIPPED | OUTPATIENT
Start: 2025-06-04

## 2025-06-04 NOTE — TELEPHONE ENCOUNTER
A DME order was placed for VASHE and barrier cream and also placed a prescription to the pharmacy to see if it can be covered this way. Voicemail left with Ellen informing her of this.

## 2025-06-04 NOTE — TELEPHONE ENCOUNTER
Care coordination calling regarding the forms that were sent from [facility] regarding the nepro (nutritional supplement)     She is going to be re faxing then now.     Please call Ellen back at [phone] to verify that the form have been received. Thanks    Dionne Lei RN  [facility]

## 2025-06-04 NOTE — TELEPHONE ENCOUNTER
[facility] care coordinator, Ellen, is working with the patient including assisting with ordering some supplies. She is requesting  an order for Vashe and barrier cream so that she can get it submitted to insurance. She can retrieve it in [place].    Phone: [phone]

## 2025-06-04 NOTE — TELEPHONE ENCOUNTER
Called pt and daughter answered, C2C on file, relayed message. She does say he dietician from [place] and her dialysis dietician from [facility] both recommend it (not sure why they're not ordering it).     I did schedule for [facility] at 5:15 tomorrow 6/5. She is also going to reach out to her nutritionists at [place] and dialysis to see what it would be for them to order this/if they know anything about the formula.    Thanks!  Blaise ALTAMIRANO RN   [facility]

## 2025-06-04 NOTE — TELEPHONE ENCOUNTER
"Please patient and her care coordinator  Apparently ( I did not know this) in order to write for NEPRO  I have to have known to document a whole bunch of things on my 5/8/25 note in order for the patient to qualify for this medication        Would have been good to know that     So now I need another visit ( video should be ok ) to go through and document step by step what [employer] says I need     AND , believe it or not   They require that I Know the \" daily caloric intake form formula\" and daily caloric intake from other food and./ or fluids  I could not make this up    So I will also need to refer them to dietary consult to figure that out....      "

## 2025-06-05 ENCOUNTER — PATIENT OUTREACH (OUTPATIENT)
Dept: CARE COORDINATION | Facility: CLINIC | Age: 75
End: 2025-06-05

## 2025-06-05 NOTE — TELEPHONE ENCOUNTER
Called Ellen back at [phone] and relayed in a detailed message to reach out to pt's dietitian at her dialysis clinic to complete the forms and then once completed Dr. Jackson will sign. Thanks    Dionne Lei RN  [facility]

## 2025-06-09 ENCOUNTER — PATIENT OUTREACH (OUTPATIENT)
Dept: CARE COORDINATION | Facility: CLINIC | Age: 75
End: 2025-06-09
Payer: COMMERCIAL

## 2025-06-09 ENCOUNTER — TELEPHONE (OUTPATIENT)
Dept: FAMILY MEDICINE | Facility: CLINIC | Age: 75
End: 2025-06-09
Payer: COMMERCIAL

## 2025-06-09 NOTE — TELEPHONE ENCOUNTER
[facility] requesting the updated 5/8 appointment notes. Renetta requesting that records + [employer] cover letter faxed to [phone] and marked attn: vidhi wadsworth

## 2025-06-11 ENCOUNTER — TELEPHONE (OUTPATIENT)
Dept: CARDIOLOGY | Facility: CLINIC | Age: 75
End: 2025-06-11
Payer: COMMERCIAL

## 2025-06-11 ENCOUNTER — TELEPHONE (OUTPATIENT)
Dept: OTHER | Facility: CLINIC | Age: 75
End: 2025-06-11
Payer: COMMERCIAL

## 2025-06-11 ENCOUNTER — TRANSFERRED RECORDS (OUTPATIENT)
Dept: HEALTH INFORMATION MANAGEMENT | Facility: CLINIC | Age: 75
End: 2025-06-11
Payer: COMMERCIAL

## 2025-06-11 ENCOUNTER — HOME INFUSION (OUTPATIENT)
Dept: HOME HEALTH SERVICES | Facility: HOME HEALTH | Age: 75
End: 2025-06-11
Payer: COMMERCIAL

## 2025-06-11 NOTE — TELEPHONE ENCOUNTER
Orders faxed and daughter Caroline chris made aware that orders have been sent. Pt having dialysis today. JNelsonSHANTA

## 2025-06-11 NOTE — TELEPHONE ENCOUNTER
Health Call Center    Phone Message    May a detailed message be left on voicemail: yes     Reason for Call: Other: Patient would like her lab orders  by Ramila Mayo faxed [place] [facility] [place] so she can have her labs drawn during dialysis. Please call her when these are faxed.       Action Taken: Other: cardiology    Travel Screening: Not Applicable  Thank you!  [facility]       Date of Service:

## 2025-06-11 NOTE — TELEPHONE ENCOUNTER
Daughter called Caroline.  She wanted to discuss flushing of gastrostomy tube.  Patient is on fluid restriction.  Also, she has noticed pink tinge fluid.  Discussed to flush with 5 ml every other day at least.  If you see stomach contents can flush as well.  Pink tinge fluid could be related to meds or irritation.  If it continues or becomes bright red need to seek medical attention.    Patient is eating and drinking by mouth.  Suggested to daughter to reach out dietician and PCP and see if tube can be removed.  Ethel Costa RN  IR nurse clinician  [phone]

## 2025-06-11 NOTE — PROGRESS NOTES
Triage Note/Care Coordination    Identify the person you spoke with and their relationship to the patient: daughter, Caroline Gray    Reason for the call: Patient condition change/concern    Enteral-Related Issues    Type of therapy: Other: Patient is taking food/liquids orally at this time.  She is not getting tube feeds currently, but still has tube in place    Describe the problem: Other: Daughter states it has happened on a few occasions recently, that there was red/pink color in feeding tube.   She flushed the tube and it went away    Interventions: Other: Daughter states that patient has been eating red popsicles and has a new medication that is pink in color.   Patient is still a renetta lift, but getting into wheelchair, so have a bit more physical movement.   Site looks good, no reddness or pain.   I instructed her to flush twice daily with 20-30mL water and contact PCP if issue continues.       Outcomes:     What is the plan for ongoing care of this patient: Problem resolved, patient will remain in home    Was there harm, averted harm, or unexpected death of the patient? No    Does the patient/caregiver verbalize agreement with the plan? Yes    Follow-Up Communication    None

## 2025-06-13 ENCOUNTER — TRANSFERRED RECORDS (OUTPATIENT)
Dept: HEALTH INFORMATION MANAGEMENT | Facility: CLINIC | Age: 75
End: 2025-06-13
Payer: COMMERCIAL

## 2025-06-16 ENCOUNTER — TELEPHONE (OUTPATIENT)
Dept: FAMILY MEDICINE | Facility: CLINIC | Age: 75
End: 2025-06-16
Payer: COMMERCIAL

## 2025-06-16 ENCOUNTER — TELEPHONE (OUTPATIENT)
Dept: FAMILY MEDICINE | Facility: CLINIC | Age: 75
End: 2025-06-16

## 2025-06-16 ENCOUNTER — MEDICAL CORRESPONDENCE (OUTPATIENT)
Dept: HEALTH INFORMATION MANAGEMENT | Facility: CLINIC | Age: 75
End: 2025-06-16
Payer: COMMERCIAL

## 2025-06-16 NOTE — TELEPHONE ENCOUNTER
Pt care coordinator calling requesting that pt's last office visit notes be Addend stating why a sit to stand would be appropriate vs a renetta if the provider is in agreement.     Please fax to [facility] once addend (Please route to TC's to fax). Thanks    Dionne Lei RN  [facility]

## 2025-06-16 NOTE — TELEPHONE ENCOUNTER
Forms/Letter Request    Type of form/letter: Standard Written Order: Patient Lift     Who is the form from? [facility]    Where did/will the form come from? Form was faxed in    When is form/letter needed by: 6/20/25    How would you like the form/letter returned: Fax to [phone]    Where form is located: Renetta to sign basket    Patient Notified form requests are processed in 5-7 business days: N/A    Could we send this information to you in Let or would you prefer to receive a phone call?: N/A    Additional Information: Specifically for patient lift and lift sling.

## 2025-06-17 ENCOUNTER — TELEPHONE (OUTPATIENT)
Dept: CARDIOLOGY | Facility: CLINIC | Age: 75
End: 2025-06-17
Payer: COMMERCIAL

## 2025-06-17 ENCOUNTER — TELEPHONE (OUTPATIENT)
Dept: FAMILY MEDICINE | Facility: CLINIC | Age: 75
End: 2025-06-17
Payer: COMMERCIAL

## 2025-06-17 NOTE — TELEPHONE ENCOUNTER
Received series of labs from [facility] labs completed on 6/11 and 6/13.  Faxed to lab entry team and sent to [facility] to scan.  Copy to Ramila Mayo, OLVIN for review.  Patient has appointment scheduled with provider on 9/18.  Tamara

## 2025-06-17 NOTE — TELEPHONE ENCOUNTER
"Forms/Letter Request    Type of form/letter: Change Order - OT Note     Who is the form from? [facility]    Where did/will the form come from? Form was faxed in    When is form/letter needed by: ASAP    How would you like the form/letter returned: Fax to [phone]     Where form is located: Renetta to sign basket    Patient Notified form requests are processed in 5-7 business days: N/A    Could we send this information to you in SIMI or would you prefer to receive a phone call?: N/A    Additional Information: Order description ends with \"Will benefit from continued skilled OT to address indep and safety\"  "

## 2025-06-19 ENCOUNTER — PATIENT OUTREACH (OUTPATIENT)
Dept: CARE COORDINATION | Facility: CLINIC | Age: 75
End: 2025-06-19
Payer: COMMERCIAL

## 2025-06-19 ENCOUNTER — TELEPHONE (OUTPATIENT)
Dept: FAMILY MEDICINE | Facility: CLINIC | Age: 75
End: 2025-06-19
Payer: COMMERCIAL

## 2025-06-19 NOTE — TELEPHONE ENCOUNTER
Pt care coordinator calling stated Zaida told her the amended notes were never received (just the order itself and two cover letters). Requested they be refaxed.

## 2025-06-19 NOTE — TELEPHONE ENCOUNTER
"Forms/Letter Request     Type of form/letter: Change Order - PT eval complete 6/17     Who is the form from? [facility]     Where did/will the form come from? Form was faxed in     When is form/letter needed by: ASAP     How would you like the form/letter returned: Fax to [phone]      Where form is located: Renetta to sign basket     Patient Notified form requests are processed in 5-7 business days: N/A     Could we send this information to you in [employer] Cloud or would you prefer to receive a phone call?: N/A     Additional Information: Order description: \"PT eval completed 6/17, PT 2W4, 1W4 to address, assisted device training and education, functional strength, functional mobility, and reduce dependence with bed, transfers, mobility, and navigating her home with WC.\"        "

## 2025-06-26 ENCOUNTER — TELEPHONE (OUTPATIENT)
Dept: FAMILY MEDICINE | Facility: CLINIC | Age: 75
End: 2025-06-26
Payer: COMMERCIAL

## 2025-06-26 ENCOUNTER — HOSPITAL ENCOUNTER (OUTPATIENT)
Facility: CLINIC | Age: 75
Discharge: HOME | End: 2025-06-26
Admitting: NURSE PRACTITIONER
Payer: COMMERCIAL

## 2025-06-26 ENCOUNTER — APPOINTMENT (OUTPATIENT)
Dept: INTERVENTIONAL RADIOLOGY/VASCULAR | Facility: CLINIC | Age: 75
End: 2025-06-26
Attending: FAMILY MEDICINE
Payer: COMMERCIAL

## 2025-06-26 DIAGNOSIS — Z99.2 ESRD ON HEMODIALYSIS (H): ICD-10-CM

## 2025-06-26 DIAGNOSIS — N18.6 ESRD ON HEMODIALYSIS (H): ICD-10-CM

## 2025-06-26 PROCEDURE — G0463 HOSPITAL OUTPT CLINIC VISIT: HCPCS

## 2025-06-26 PROCEDURE — 999N000087 HC STATISTIC IV OP-OVERFLOW

## 2025-06-26 ASSESSMENT — ACTIVITIES OF DAILY LIVING (ADL): ADLS_ACUITY_SCORE: 63

## 2025-06-26 NOTE — TELEPHONE ENCOUNTER
Forms/Letter Request    Type of form/letter: Home Health Cert & Plan of Care (6/17/25-8/15/25)     Who is the form from? [facility]     Where did/will the form come from? Form was faxed in    When is form/letter needed by: ASAP    How would you like the form/letter returned: Fax to [phone]     Where form is located: Renetta to sign basket    Additional Information: Order #[id number]

## 2025-06-26 NOTE — DISCHARGE INSTRUCTIONS
G-tube Removal Discharge Instructions     After you go home:    Do not eat or drink anything for 6 hours.   After 6 hours, remove the dressing from the site and drink a small amount of water. If water or contents leak from the site, stop drinking water and wait 2 more hours. After 2 hours - repeat the process. If there is leaking from the site again - wait another 2 hours to try again.    If there is no leaking from the site, you may start drinking water and eating. Start with a liquid or soft diet.    Care of Insertion Site:    For the first 48 hrs, check your puncture site every couple hours while you are awake   You may change the dressing daily, but more often if dressing becomes wet or dirty. It is not unusual to have drainage from the opening until the site is completely healed.   You may remove the dressing when the site is completely healed   You may shower tomorrow  No  tub baths, whirlpools or swimming until your puncture site has fully healed    Activity     You may go back to normal activity in 24 hours   Wait 48 hours before lifting, straining, exercise or other strenuous activity    Medicines:    You may resume all medications  Resume your Warfarin/Coumadin at your regular dose today. Follow up with your provider to have your INR rechecked  Resume your Platelet Inhibitors and Aspirin tomorrow at your regular dose  For minor pain, you may take Acetaminophen (Tylenol) or Ibuprofen (Advil)                 Call the provider who ordered this procedure if:    Blood or fluid is draining from the site  The site is red, swollen, hot, tender or there is foul-smelling drainage  Chills or a fever greater than 101 F (38 C)  Increased pain at the site  Any questions or concerns    Call  911 or go to the Emergency Room if:    Severe pain or trouble breathing  Bleeding that you cannot control      If you have questions call:       Interventional Radiology Intake Center @ [phone]    Mon - Fri  7:30 am - 4 pm           Calls will be returned on the next business day  If you need immediate assistance - please be seen   in an Urgent Care or Emergency Department    You may also contact your provider via My Chart

## 2025-06-26 NOTE — PROGRESS NOTES
G tube pulled in room by Cherelle CROCKETT. Site CDI/soft/denies pain. Pt and daughter understand discharge instructions, discharged to lobby with all belongings.

## 2025-06-26 NOTE — PROGRESS NOTES
Supriya Herr is here in care suites for a G tube removal with her daughter.     Yaneth s a 75 year old woman with with very complex past medical history including end-stage renal disease on hemodialysis, HFpEF, paroxysmal atrial fibrillation, hypertension, hyperlipidemia, anxiety, depression, chronic anemia, dysphagia status post PEG tube, type 2 diabetes, left traumatic AKA and recent hospitalization with multifactorial respiratory failure due to para influenza January. She had a g tube placed for nutrition 2/17/25 and has not used it in awhile. Request for removal made.     After explaining the procedure and answering questions the g tube was removed intact and without pain. Gauze and tape placed over the site and instructions reviewed for NPO as best they can. Daughter concerned as her mother is diabetic and hasn't eaten all day. I relayed that she could eat some food, but try to limit it as she would have leaking initially.     Extra gauze and tape given. Discharge instructions given by Care suites SHANTA Sy.     Total time: 20 minutes    Thanks, Bertha [facility] Interventional Radiology CNP ([phone]) (phone [phone])

## 2025-07-01 ENCOUNTER — HOSPITAL ENCOUNTER (OUTPATIENT)
Dept: WOUND CARE | Facility: CLINIC | Age: 75
Discharge: HOME | End: 2025-07-01
Attending: FAMILY MEDICINE | Admitting: FAMILY MEDICINE
Payer: COMMERCIAL

## 2025-07-01 DIAGNOSIS — L89.302 PRESSURE INJURY OF BUTTOCK, STAGE 2, UNSPECIFIED LATERALITY (H): Primary | ICD-10-CM

## 2025-07-01 DIAGNOSIS — L89.322: ICD-10-CM

## 2025-07-01 PROCEDURE — G0463 HOSPITAL OUTPT CLINIC VISIT: HCPCS

## 2025-07-01 NOTE — DISCHARGE INSTRUCTIONS
07/01/2025   Supriya Herr   1950     Dressing changes outside of clinic are being performed by Home Care     [facility] Phone [phone] Fax [phone]     Plan 05/27/2025   - you do not need another appointment here unless the wound(s) reopen despite best care  - if dealing with frequent diarrhea, abstain from cover dressings and use the zinc moisture barrier paste and good hygiene  - zinc moisture barrier paste can be purchased OTC (examples are Desitin; the concentration of zinc is not important as they vary)  - recommended: On days you are not at Dialysis, only up in the chair for three hours at a time then return to bed for an hour before getting back up in your chair  -Utilize your Roho cushion only and avoid any pillows or neck pillows as this can cause pressure points  -When at dialysis or in any other chair, move your Roho cushion onto those chairs   -Continue to use the airflow mattress at home. Having your bed at home 30 degrees or less is best to avoid sliding down in your bed and reducing pressure on the wounds.     Skin care: Gluteal  - Wash your hands with soap and water before you begin your dressing change and prepare a clean surface for dressings.  - Cleanse with mild unscented soap and water (such as Cetaphil, Cerave or Dove)   - good rinse away the soap  - Pat the area dry  - If experiencing frequent incontinence: abstain from the Mepilex silicone border dressing and lightly apply zinc moisture barrier paste to the area    - excess paste can be removed by applying mineral oil to cotton ball and gently rubbed over area until clean; otherwise, clean away gently any soiled paste and reapply as needed  - optional if you want to purchase Secondary dressing: Cover with 6x6 Mepilex silicone border dressing and should be changed dressing three times a week minimally and as needed for soilage/leakage     Skin care: Left IT   - Wash your hands with soap and water before you begin your  dressing change and prepare a clean surface for dressings.  - Cleanse with mild unscented soap and water (such as Cetaphil, Cerave or Dove)   - good rinse away the soap  - Pat the area dry  - lightly apply zinc moisture barrier paste to the area    - excess paste can be removed by applying mineral oil to cotton ball and gently rubbed over area until clean; otherwise, clean away gently any soiled paste and reapply as needed     You do not need to change the dressing on the days you are being seen at the wound clinic     Main Provider: Milton Daugherty M.D. July 1, 2025    Call us at [phone] if you have any questions about your wounds, if you have redness or swelling around your wound, have a fever of 101 degrees Fahrenheit or greater or if you have any other problems or concerns. We answer the phone Monday through Friday 8 am to 4 pm, please leave a message as we check the voicemail frequently throughout the day. If you have a concern over the weekend, please leave a message and we will return your call Monday. If the need is urgent, go to the ER or urgent care.    If you had a positive experience please indicate that on your patient satisfaction survey form that [facility] will be sending you.    It was a pleasure meeting with you today.  Thank you for allowing our team the privilege of caring for you today.  YOU are the reason we are here, and we truly hope we provided you with the excellent service you deserve.  Please let us know if there is anything else we can do for you so that we can be sure you are leaving completely satisfied with your care experience.      If you have any billing related questions please call the [facility] Business office at [phone]. The clinic staff does not handle billing related matters.    If you are scheduled to have a follow up appointment, you will receive a reminder call the day before your visit. On the appointment day please arrive 15 minutes prior to your  appointment time. If you are unable to keep that appointment, please call the clinic to cancel or reschedule. If you are more than 10 minutes late or greater for your scheduled appointment time, the clinic policy is that you may be asked to reschedule.

## 2025-07-01 NOTE — PROGRESS NOTES
Patient and daughter arrived for wound care visit. Certified Wound Care Nurse time spent evaluating patient record, and completed a full evaluation; provided recommendation based on treatment plan. Reviewed discharge instructions, patient education, and discussed plan of care with appropriate medical team staff members and patient and/or family members.

## 2025-07-02 ENCOUNTER — TELEPHONE (OUTPATIENT)
Dept: FAMILY MEDICINE | Facility: CLINIC | Age: 75
End: 2025-07-02
Payer: COMMERCIAL

## 2025-07-02 NOTE — TELEPHONE ENCOUNTER
"Forms/Letter Request    Type of form/letter: Enteral Nutrition Order     Who is the form from? [employer]    Where did/will the form come from? Form was faxed in    When is form/letter needed by: ASAP    How would you like the form/letter returned: Fax to [phone]     Where form is located: Renetta to sign basket    Additional Information: \"[facility] [employer] [employer]oss/carbon 24/CS\"  "

## 2025-07-02 NOTE — TELEPHONE ENCOUNTER
Forms/Letter Request    Type of form/letter: Home Health Cert/Plan of Care (Multiple Forms!)     Who is the form from? [facility]     Where did/will the form come from? Form was faxed in    When is form/letter needed by: ASAP    How would you like the form/letter returned: Fax to [phone]     Where form is located: Renetta to sign basket    Additional Information: See below for included forms:    Home Health Cert/Plan of Care (4/18/25-6/16/25)  Change Order (Order No: [id number])  Missed Visit Note - 6/24/25 (Order No: [id number])  Missed Visit Note - 6/26/25 (Order No: [id number])

## 2025-07-03 ENCOUNTER — PATIENT OUTREACH (OUTPATIENT)
Dept: CARE COORDINATION | Facility: CLINIC | Age: 75
End: 2025-07-03
Payer: COMMERCIAL

## 2025-07-03 ENCOUNTER — MEDICAL CORRESPONDENCE (OUTPATIENT)
Dept: HEALTH INFORMATION MANAGEMENT | Facility: CLINIC | Age: 75
End: 2025-07-03
Payer: COMMERCIAL

## 2025-07-08 ENCOUNTER — ANCILLARY PROCEDURE (OUTPATIENT)
Dept: ULTRASOUND IMAGING | Facility: CLINIC | Age: 75
End: 2025-07-08
Attending: PODIATRIST
Payer: COMMERCIAL

## 2025-07-08 ENCOUNTER — OFFICE VISIT (OUTPATIENT)
Dept: ORTHOPEDICS | Facility: CLINIC | Age: 75
End: 2025-07-08
Payer: COMMERCIAL

## 2025-07-08 DIAGNOSIS — I73.9 PAD (PERIPHERAL ARTERY DISEASE): ICD-10-CM

## 2025-07-08 DIAGNOSIS — I87.321 CHRONIC VENOUS HYPERTENSION (IDIOPATHIC) WITH INFLAMMATION OF RIGHT LOWER EXTREMITY: ICD-10-CM

## 2025-07-08 DIAGNOSIS — I73.89 OTHER SPECIFIED PERIPHERAL VASCULAR DISEASES: ICD-10-CM

## 2025-07-08 DIAGNOSIS — I73.9 PVD (PERIPHERAL VASCULAR DISEASE): ICD-10-CM

## 2025-07-08 DIAGNOSIS — L74.0 HEAT RASH: Primary | ICD-10-CM

## 2025-07-08 DIAGNOSIS — B35.1 OM (ONYCHOMYCOSIS): ICD-10-CM

## 2025-07-08 DIAGNOSIS — L84 TYLOMA: ICD-10-CM

## 2025-07-08 DIAGNOSIS — Z89.612 S/P AKA (ABOVE KNEE AMPUTATION) UNILATERAL, LEFT (H): ICD-10-CM

## 2025-07-08 PROCEDURE — 99213 OFFICE O/P EST LOW 20 MIN: CPT | Mod: 25 | Performed by: PODIATRIST

## 2025-07-08 PROCEDURE — 11720 DEBRIDE NAIL 1-5: CPT | Mod: Q7 | Performed by: PODIATRIST

## 2025-07-08 PROCEDURE — 93971 EXTREMITY STUDY: CPT | Mod: RT | Performed by: RADIOLOGY

## 2025-07-08 NOTE — PROGRESS NOTES
Chief Complaint   Patient presents with    Follow Up     Diabetic foot exam and management.   Soreness - right great toe.            Allergies   Allergen Reactions    Ampicillin-Sulbactam Sodium Rash     No evidence SJS, but very uncomfortable and precipitated multiple provider visits. Would not use penicillins again if other options available.     Penicillins Rash         Subjective: Supriya is a 75 year old female who presents to the clinic today for a diabetic foot exam and management.  Her nails do get long.  she has a history of amputation and from previous notes, we have documented that she has nonpalpable DP and PT pulses.    Interval history: She presents today with her daughter.  She had the venous competency this morning. She has a couple of new, painless red spots on the bottom of the right foot. These started during the hot days. She did get new compression socks.      Objective    Hemoglobin A1C   Date Value Ref Range Status   01/17/2025 6.5 (H) <5.7 % Final     Comment:     Normal <5.7%   Prediabetes 5.7-6.4%    Diabetes 6.5% or higher     Note: Adopted from ADA consensus guidelines.   04/09/2021 6.2 (H) 0 - 5.6 % Final     Comment:     Normal <5.7% Prediabetes 5.7-6.4%  Diabetes 6.5% or higher - adopted from ADA   consensus guidelines.           Non-palpable DP and PT pulses L.   Equinus noted BL. Pes planus with rigid toe deformities noted to lesser digits on the right. Left AKA noted.   Nails are thickened, discolored, elongated, with subungual debris consistent with onychomycosis.  Small, resolving red macules noted to the plantar right foot with no pain noted with palpation of the areas. No s/s of infection noted.         Assessment: DM2 with left AKA and neuropathy. She has severe vasculopathy.  Onychomycosis.   Tyloma  Encounter Diagnoses   Name Primary?    Heat rash Yes    PVD (peripheral vascular disease)     Other specified peripheral vascular diseases     S/P AKA (above knee amputation)  unilateral, left (H)     PAD (peripheral artery disease)     Chronic venous hypertension (idiopathic) with inflammation of right lower extremity     OM (onychomycosis)     Tyloma           Plan:   - Pt seen and evaluated  - Nails debrided x 5.  - Tyloma debrided x 1.  - Recommend continuation of watching the areas on the plantar foot. These appear to be like heat rash.   - Cont compression socks.  - See again in 9 weeks.

## 2025-07-08 NOTE — LETTER
7/8/2025      Supriya Herr  [address]  [facility] [place] [address]      Dear Colleague,    Thank you for referring your patient, Surpiya Herr, to the [facility] [place]. Please see a copy of my visit note below.    Chief Complaint   Patient presents with     Follow Up     Diabetic foot exam and management.   Soreness - right great toe.            Allergies   Allergen Reactions     Ampicillin-Sulbactam Sodium Rash     No evidence SJS, but very uncomfortable and precipitated multiple provider visits. Would not use penicillins again if other options available.      Penicillins Rash         Subjective: Supriya is a 75 year old female who presents to the clinic today for a diabetic foot exam and management.  Her nails do get long.  she has a history of amputation and from previous notes, we have documented that she has nonpalpable DP and PT pulses.    Interval history: She presents today with her daughter.  She had the venous competency this morning. She has a couple of new, painless red spots on the bottom of the right foot. These started during the hot days. She did get new compression socks.      Objective    Hemoglobin A1C   Date Value Ref Range Status   01/17/2025 6.5 (H) <5.7 % Final     Comment:     Normal <5.7%   Prediabetes 5.7-6.4%    Diabetes 6.5% or higher     Note: Adopted from ADA consensus guidelines.   04/09/2021 6.2 (H) 0 - 5.6 % Final     Comment:     Normal <5.7% Prediabetes 5.7-6.4%  Diabetes 6.5% or higher - adopted from ADA   consensus guidelines.           Non-palpable DP and PT pulses L.   Equinus noted BL. Pes planus with rigid toe deformities noted to lesser digits on the right. Left AKA noted.   Nails are thickened, discolored, elongated, with subungual debris consistent with onychomycosis.  Small, resolving red macules noted to the plantar right foot with no pain noted with palpation of the areas. No s/s of infection noted.         Assessment: DM2 with left  AKA and neuropathy. She has severe vasculopathy.  Onychomycosis.   Tyloma  Encounter Diagnoses   Name Primary?     Heat rash Yes     PVD (peripheral vascular disease)      Other specified peripheral vascular diseases      S/P AKA (above knee amputation) unilateral, left (H)      PAD (peripheral artery disease)      Chronic venous hypertension (idiopathic) with inflammation of right lower extremity      OM (onychomycosis)      Tyloma           Plan:   - Pt seen and evaluated  - Nails debrided x 5.  - Tyloma debrided x 1.  - Recommend continuation of watching the areas on the plantar foot. These appear to be like heat rash.   - Cont compression socks.  - See again in 9 weeks.      Again, thank you for allowing me to participate in the care of your patient.        Sincerely,        Eleazar Pack DPM    Electronically signed

## 2025-07-21 ENCOUNTER — MEDICAL CORRESPONDENCE (OUTPATIENT)
Dept: HEALTH INFORMATION MANAGEMENT | Facility: CLINIC | Age: 75
End: 2025-07-21
Payer: COMMERCIAL

## 2025-07-21 ENCOUNTER — TELEPHONE (OUTPATIENT)
Dept: FAMILY MEDICINE | Facility: CLINIC | Age: 75
End: 2025-07-21
Payer: COMMERCIAL

## 2025-07-21 NOTE — TELEPHONE ENCOUNTER
"Forms/Letter Request    Type of form/letter: Order #[id number]     Who is the form from? [facility]    Where did/will the form come from? Form was faxed in    How would you like the form/letter returned: Fax to [phone]    Where form is located: Renetta to sign basket    Additional Information: \"CG reports feeling confident with wound care. Requesting okay for the following frequency update: SN 1xweekly for skin assessment and ROS with 6prn for change in condition\"  "

## 2025-07-31 ENCOUNTER — MYC REFILL (OUTPATIENT)
Dept: PHARMACY | Facility: CLINIC | Age: 75
End: 2025-07-31
Payer: COMMERCIAL

## 2025-07-31 DIAGNOSIS — K21.9 GASTROESOPHAGEAL REFLUX DISEASE, UNSPECIFIED WHETHER ESOPHAGITIS PRESENT: ICD-10-CM

## 2025-07-31 DIAGNOSIS — I48.0 PAROXYSMAL ATRIAL FIBRILLATION (H): ICD-10-CM

## 2025-07-31 RX ORDER — OMEPRAZOLE 20 MG/1
20 CAPSULE, DELAYED RELEASE ORAL DAILY
Qty: 90 CAPSULE | Refills: 3 | OUTPATIENT
Start: 2025-07-31

## 2025-07-31 RX ORDER — AMIODARONE HYDROCHLORIDE 200 MG/1
200 TABLET ORAL DAILY
Qty: 90 TABLET | Refills: 1 | OUTPATIENT
Start: 2025-07-31

## 2025-08-05 ENCOUNTER — PATIENT OUTREACH (OUTPATIENT)
Dept: GERIATRIC MEDICINE | Facility: CLINIC | Age: 75
End: 2025-08-05

## 2025-08-05 ENCOUNTER — VIRTUAL VISIT (OUTPATIENT)
Dept: SLEEP MEDICINE | Facility: CLINIC | Age: 75
End: 2025-08-05
Payer: COMMERCIAL

## 2025-08-05 VITALS
SYSTOLIC BLOOD PRESSURE: 149 MMHG | BODY MASS INDEX: 31.34 KG/M2 | HEART RATE: 66 BPM | DIASTOLIC BLOOD PRESSURE: 50 MMHG | WEIGHT: 195 LBS | HEIGHT: 66 IN

## 2025-08-05 DIAGNOSIS — G47.33 OSA (OBSTRUCTIVE SLEEP APNEA): ICD-10-CM

## 2025-08-05 PROCEDURE — 3077F SYST BP >= 140 MM HG: CPT | Mod: 95 | Performed by: INTERNAL MEDICINE

## 2025-08-05 PROCEDURE — 3078F DIAST BP <80 MM HG: CPT | Mod: 95 | Performed by: INTERNAL MEDICINE

## 2025-08-05 PROCEDURE — 1126F AMNT PAIN NOTED NONE PRSNT: CPT | Mod: 95 | Performed by: INTERNAL MEDICINE

## 2025-08-05 PROCEDURE — 98001 SYNCH AUDIO-VIDEO NEW LOW 30: CPT | Performed by: INTERNAL MEDICINE

## 2025-08-05 RX ORDER — B COMPLEX, C NO.20/FOLIC ACID 1 MG
CAPSULE ORAL
COMMUNITY
Start: 2025-05-20

## 2025-08-05 ASSESSMENT — SLEEP AND FATIGUE QUESTIONNAIRES
HOW LIKELY ARE YOU TO NOD OFF OR FALL ASLEEP WHILE SITTING QUIETLY AFTER LUNCH WITHOUT ALCOHOL: SLIGHT CHANCE OF DOZING
HOW LIKELY ARE YOU TO NOD OFF OR FALL ASLEEP WHILE WATCHING TV: MODERATE CHANCE OF DOZING
HOW LIKELY ARE YOU TO NOD OFF OR FALL ASLEEP IN A CAR, WHILE STOPPED FOR A FEW MINUTES IN TRAFFIC: WOULD NEVER DOZE
HOW LIKELY ARE YOU TO NOD OFF OR FALL ASLEEP WHILE LYING DOWN TO REST IN THE AFTERNOON WHEN CIRCUMSTANCES PERMIT: HIGH CHANCE OF DOZING
HOW LIKELY ARE YOU TO NOD OFF OR FALL ASLEEP WHILE SITTING AND TALKING TO SOMEONE: WOULD NEVER DOZE
HOW LIKELY ARE YOU TO NOD OFF OR FALL ASLEEP WHEN YOU ARE A PASSENGER IN A CAR FOR AN HOUR WITHOUT A BREAK: SLIGHT CHANCE OF DOZING
HOW LIKELY ARE YOU TO NOD OFF OR FALL ASLEEP WHILE SITTING AND READING: MODERATE CHANCE OF DOZING
HOW LIKELY ARE YOU TO NOD OFF OR FALL ASLEEP WHILE SITTING INACTIVE IN A PUBLIC PLACE: SLIGHT CHANCE OF DOZING

## 2025-08-05 ASSESSMENT — PAIN SCALES - GENERAL: PAINLEVEL_OUTOF10: NO PAIN (0)

## 2025-08-07 DIAGNOSIS — I10 HYPERTENSION GOAL BP (BLOOD PRESSURE) < 140/90: ICD-10-CM

## 2025-08-07 RX ORDER — HYDRALAZINE HYDROCHLORIDE 100 MG/1
TABLET, FILM COATED ORAL
Qty: 90 TABLET | Refills: 3 | OUTPATIENT
Start: 2025-08-07

## 2025-08-11 ENCOUNTER — TELEPHONE (OUTPATIENT)
Dept: FAMILY MEDICINE | Facility: CLINIC | Age: 75
End: 2025-08-11
Payer: COMMERCIAL

## 2025-08-18 ENCOUNTER — MYC MEDICAL ADVICE (OUTPATIENT)
Dept: FAMILY MEDICINE | Facility: CLINIC | Age: 75
End: 2025-08-18
Payer: COMMERCIAL

## 2025-08-18 ENCOUNTER — MEDICAL CORRESPONDENCE (OUTPATIENT)
Dept: HEALTH INFORMATION MANAGEMENT | Facility: CLINIC | Age: 75
End: 2025-08-18
Payer: COMMERCIAL

## 2025-08-18 DIAGNOSIS — R09.02 HYPOXEMIA: Primary | ICD-10-CM

## 2025-08-18 DIAGNOSIS — J96.00 ACUTE RESPIRATORY FAILURE, UNSPECIFIED WHETHER WITH HYPOXIA OR HYPERCAPNIA (H): ICD-10-CM

## 2025-08-19 ENCOUNTER — HOSPITAL ENCOUNTER (EMERGENCY)
Facility: CLINIC | Age: 75
Discharge: HOME | End: 2025-08-19
Attending: EMERGENCY MEDICINE
Payer: COMMERCIAL

## 2025-08-19 ENCOUNTER — MEDICAL CORRESPONDENCE (OUTPATIENT)
Dept: HEALTH INFORMATION MANAGEMENT | Facility: CLINIC | Age: 75
End: 2025-08-19

## 2025-08-19 ENCOUNTER — OFFICE VISIT (OUTPATIENT)
Dept: URGENT CARE | Facility: URGENT CARE | Age: 75
End: 2025-08-19
Payer: COMMERCIAL

## 2025-08-19 ENCOUNTER — APPOINTMENT (OUTPATIENT)
Dept: GENERAL RADIOLOGY | Facility: CLINIC | Age: 75
End: 2025-08-19
Attending: EMERGENCY MEDICINE
Payer: COMMERCIAL

## 2025-08-19 ENCOUNTER — TELEPHONE (OUTPATIENT)
Dept: FAMILY MEDICINE | Facility: CLINIC | Age: 75
End: 2025-08-19

## 2025-08-19 ENCOUNTER — TELEPHONE (OUTPATIENT)
Dept: FAMILY MEDICINE | Facility: CLINIC | Age: 75
End: 2025-08-19
Payer: COMMERCIAL

## 2025-08-19 VITALS
OXYGEN SATURATION: 98 % | TEMPERATURE: 98.3 F | BODY MASS INDEX: 31.47 KG/M2 | RESPIRATION RATE: 17 BRPM | HEART RATE: 71 BPM | HEIGHT: 66 IN | SYSTOLIC BLOOD PRESSURE: 168 MMHG | DIASTOLIC BLOOD PRESSURE: 69 MMHG

## 2025-08-19 VITALS
TEMPERATURE: 97.1 F | SYSTOLIC BLOOD PRESSURE: 163 MMHG | OXYGEN SATURATION: 98 % | RESPIRATION RATE: 20 BRPM | HEART RATE: 69 BPM | DIASTOLIC BLOOD PRESSURE: 59 MMHG

## 2025-08-19 DIAGNOSIS — E11.69 TYPE 2 DIABETES MELLITUS WITH OTHER SPECIFIED COMPLICATION, WITH LONG-TERM CURRENT USE OF INSULIN (H): ICD-10-CM

## 2025-08-19 DIAGNOSIS — N18.6 END STAGE RENAL FAILURE ON DIALYSIS (H): ICD-10-CM

## 2025-08-19 DIAGNOSIS — I50.9 CONGESTIVE HEART FAILURE, UNSPECIFIED HF CHRONICITY, UNSPECIFIED HEART FAILURE TYPE (H): ICD-10-CM

## 2025-08-19 DIAGNOSIS — N18.6 ESRD (END STAGE RENAL DISEASE) ON DIALYSIS (H): ICD-10-CM

## 2025-08-19 DIAGNOSIS — Z99.2 END STAGE RENAL FAILURE ON DIALYSIS (H): ICD-10-CM

## 2025-08-19 DIAGNOSIS — R07.9 CHEST PAIN, UNSPECIFIED TYPE: ICD-10-CM

## 2025-08-19 DIAGNOSIS — Z79.4 TYPE 2 DIABETES MELLITUS WITH OTHER SPECIFIED COMPLICATION, WITH LONG-TERM CURRENT USE OF INSULIN (H): ICD-10-CM

## 2025-08-19 DIAGNOSIS — R06.02 SOB (SHORTNESS OF BREATH): Primary | ICD-10-CM

## 2025-08-19 DIAGNOSIS — Z99.2 ESRD (END STAGE RENAL DISEASE) ON DIALYSIS (H): ICD-10-CM

## 2025-08-19 DIAGNOSIS — R07.9 CHEST PAIN, UNSPECIFIED TYPE: Primary | ICD-10-CM

## 2025-08-19 LAB
ALBUMIN SERPL BCG-MCNC: 3.9 G/DL (ref 3.5–5.2)
ALP SERPL-CCNC: 136 U/L (ref 40–150)
ALT SERPL W P-5'-P-CCNC: 10 U/L (ref 0–50)
ANION GAP SERPL CALCULATED.3IONS-SCNC: 14 MMOL/L (ref 7–15)
AST SERPL W P-5'-P-CCNC: 11 U/L (ref 0–45)
ATRIAL RATE - MUSE: 69 BPM
BILIRUB SERPL-MCNC: 0.3 MG/DL
BUN SERPL-MCNC: 33.2 MG/DL (ref 8–23)
CALCIUM SERPL-MCNC: 9.3 MG/DL (ref 8.8–10.4)
CHLORIDE SERPL-SCNC: 99 MMOL/L (ref 98–107)
CREAT SERPL-MCNC: 4.25 MG/DL (ref 0.51–0.95)
DIASTOLIC BLOOD PRESSURE - MUSE: NORMAL MMHG
EGFRCR SERPLBLD CKD-EPI 2021: 10 ML/MIN/1.73M2
ERYTHROCYTE [DISTWIDTH] IN BLOOD BY AUTOMATED COUNT: 15.5 % (ref 10–15)
GLUCOSE SERPL-MCNC: 128 MG/DL (ref 70–99)
HCO3 SERPL-SCNC: 29 MMOL/L (ref 22–29)
HCT VFR BLD AUTO: 27.8 % (ref 35–47)
HGB BLD-MCNC: 8.8 G/DL (ref 11.7–15.7)
HOLD SPECIMEN: NORMAL
INTERPRETATION ECG - MUSE: NORMAL
MCH RBC QN AUTO: 30.1 PG (ref 26.5–33)
MCHC RBC AUTO-ENTMCNC: 31.7 G/DL (ref 31.5–36.5)
MCV RBC AUTO: 95.2 FL (ref 78–100)
P AXIS - MUSE: 78 DEGREES
PLATELET # BLD AUTO: 229 10E3/UL (ref 150–450)
POTASSIUM SERPL-SCNC: 4.5 MMOL/L (ref 3.4–5.3)
PR INTERVAL - MUSE: 170 MS
PROT SERPL-MCNC: 7 G/DL (ref 6.4–8.3)
QRS DURATION - MUSE: 88 MS
QT - MUSE: 446 MS
QTC - MUSE: 477 MS
R AXIS - MUSE: 27 DEGREES
RBC # BLD AUTO: 2.92 10E6/UL (ref 3.8–5.2)
SODIUM SERPL-SCNC: 142 MMOL/L (ref 135–145)
SYSTOLIC BLOOD PRESSURE - MUSE: NORMAL MMHG
T AXIS - MUSE: 32 DEGREES
TROPONIN T SERPL HS-MCNC: 82 NG/L
TROPONIN T SERPL HS-MCNC: 85 NG/L
VENTRICULAR RATE- MUSE: 69 BPM
WBC # BLD AUTO: 6.83 10E3/UL (ref 4–11)

## 2025-08-19 PROCEDURE — 3078F DIAST BP <80 MM HG: CPT | Performed by: FAMILY MEDICINE

## 2025-08-19 PROCEDURE — 99285 EMERGENCY DEPT VISIT HI MDM: CPT | Mod: 25 | Performed by: EMERGENCY MEDICINE

## 2025-08-19 PROCEDURE — 93005 ELECTROCARDIOGRAM TRACING: CPT

## 2025-08-19 PROCEDURE — 71046 X-RAY EXAM CHEST 2 VIEWS: CPT

## 2025-08-19 PROCEDURE — 99215 OFFICE O/P EST HI 40 MIN: CPT | Performed by: FAMILY MEDICINE

## 2025-08-19 PROCEDURE — 36415 COLL VENOUS BLD VENIPUNCTURE: CPT | Performed by: EMERGENCY MEDICINE

## 2025-08-19 PROCEDURE — 36415 COLL VENOUS BLD VENIPUNCTURE: CPT

## 2025-08-19 PROCEDURE — 80053 COMPREHEN METABOLIC PANEL: CPT | Performed by: EMERGENCY MEDICINE

## 2025-08-19 PROCEDURE — 84484 ASSAY OF TROPONIN QUANT: CPT | Performed by: EMERGENCY MEDICINE

## 2025-08-19 PROCEDURE — 80053 COMPREHEN METABOLIC PANEL: CPT

## 2025-08-19 PROCEDURE — 85027 COMPLETE CBC AUTOMATED: CPT | Performed by: EMERGENCY MEDICINE

## 2025-08-19 PROCEDURE — 3077F SYST BP >= 140 MM HG: CPT | Performed by: FAMILY MEDICINE

## 2025-08-19 PROCEDURE — 84484 ASSAY OF TROPONIN QUANT: CPT

## 2025-08-19 PROCEDURE — 85014 HEMATOCRIT: CPT

## 2025-08-19 ASSESSMENT — ACTIVITIES OF DAILY LIVING (ADL)
ADLS_ACUITY_SCORE: 63

## 2025-08-20 ENCOUNTER — PATIENT OUTREACH (OUTPATIENT)
Dept: GERIATRIC MEDICINE | Facility: CLINIC | Age: 75
End: 2025-08-20
Payer: COMMERCIAL

## 2025-08-20 ENCOUNTER — TELEPHONE (OUTPATIENT)
Dept: FAMILY MEDICINE | Facility: CLINIC | Age: 75
End: 2025-08-20
Payer: COMMERCIAL

## 2025-08-23 ENCOUNTER — HEALTH MAINTENANCE LETTER (OUTPATIENT)
Age: 75
End: 2025-08-23

## 2025-08-25 ENCOUNTER — MEDICAL CORRESPONDENCE (OUTPATIENT)
Dept: HEALTH INFORMATION MANAGEMENT | Facility: CLINIC | Age: 75
End: 2025-08-25
Payer: COMMERCIAL

## 2025-08-26 ENCOUNTER — OFFICE VISIT (OUTPATIENT)
Dept: ORTHOPEDICS | Facility: CLINIC | Age: 75
End: 2025-08-26
Payer: COMMERCIAL

## 2025-08-26 DIAGNOSIS — L30.9 IDIOPATHIC DERMATITIS: Primary | ICD-10-CM

## 2025-08-26 DIAGNOSIS — T14.8XXA DEEP TISSUE INJURY: ICD-10-CM

## 2025-08-26 DIAGNOSIS — L90.9 FAT PAD ATROPHY OF FOOT: ICD-10-CM

## 2025-08-26 DIAGNOSIS — M21.621 TAILOR'S BUNION OF RIGHT FOOT: ICD-10-CM

## 2025-08-26 DIAGNOSIS — Z89.612 S/P AKA (ABOVE KNEE AMPUTATION) UNILATERAL, LEFT (H): ICD-10-CM

## 2025-08-26 PROCEDURE — 99214 OFFICE O/P EST MOD 30 MIN: CPT | Performed by: PODIATRIST

## 2025-08-26 RX ORDER — TRIAMCINOLONE ACETONIDE 0.25 MG/G
OINTMENT TOPICAL DAILY
Qty: 30 G | Refills: 0 | Status: SHIPPED | OUTPATIENT
Start: 2025-08-26

## 2025-08-27 ENCOUNTER — TELEPHONE (OUTPATIENT)
Dept: PULMONOLOGY | Facility: CLINIC | Age: 75
End: 2025-08-27
Payer: COMMERCIAL

## 2025-08-27 DIAGNOSIS — R09.02 HYPOXEMIA: Primary | ICD-10-CM

## 2025-09-03 ENCOUNTER — TELEPHONE (OUTPATIENT)
Dept: FAMILY MEDICINE | Facility: CLINIC | Age: 75
End: 2025-09-03
Payer: COMMERCIAL

## (undated) DEVICE — EYE TIP IRRIGATION & ASPIRATION POLYMER 35D BENT 8065751511

## (undated) DEVICE — DRAPE STOCKINETTE 4" 8544

## (undated) DEVICE — SU SILK 2-0 TIE 12X30" A305H

## (undated) DEVICE — BASIN SET SINGLE STERILE 13752-624

## (undated) DEVICE — SU PROLENE 6-0 RB-2DA 30" 8711H

## (undated) DEVICE — SPONGE RAY-TEC 4X8" 7318

## (undated) DEVICE — EYE SPONGE SPEAR WECK CEL 0008685

## (undated) DEVICE — ESU GROUND PAD ADULT W/CORD E7507

## (undated) DEVICE — SU SILK 4-0 TIE 12X30" A303H

## (undated) DEVICE — EYE PACK CUSTOM ANTERIOR 30DEG TIP CENTURION PPK6682-04

## (undated) DEVICE — VESSEL LOOPS DEV-O-LOOP WHITE MINI 31145728

## (undated) DEVICE — ADH SKIN CLOSURE PREMIERPRO EXOFIN 1.0ML 3470

## (undated) DEVICE — DRAPE SHEET REV FOLD 3/4 9349

## (undated) DEVICE — SU PROLENE 6-0 RB-2DA 18" 8714H

## (undated) DEVICE — INTRO SHEATH 7FRX10CM PINNACLE RSS702

## (undated) DEVICE — PACK AV FISTULA

## (undated) DEVICE — NDL ANGIOCATH 20GA 1.75" 4059

## (undated) DEVICE — SU SILK 3-0 TIE 12X30" A304H

## (undated) DEVICE — SU VICRYL 3-0 SH 27" J316H

## (undated) DEVICE — SU ETHILON 3-0 PS-1 18" 1663H

## (undated) DEVICE — PREP CHLORAPREP 26ML TINTED ORANGE  260815

## (undated) DEVICE — SOL NACL 0.9% IRRIG 1000ML BOTTLE 2F7124

## (undated) DEVICE — SUTURE BOOTS 051003PBX

## (undated) DEVICE — GEL ULTRASOUND AQUASONIC 20GM 01-01

## (undated) DEVICE — LINEN TOWEL PACK X30 5481

## (undated) DEVICE — GLOVE PROTEXIS MICRO 7.5  2D73PM75

## (undated) DEVICE — SUCTION MANIFOLD DORNOCH ULTRA CART UL-CL500

## (undated) DEVICE — SU MONOCRYL 4-0 PS-2 27" UND Y426H

## (undated) DEVICE — BNDG ELASTIC 4"X5YDS STERILE 6611-4S

## (undated) DEVICE — SU PROLENE 7-0 BV175-6 24" 8735H

## (undated) DEVICE — BLADE SAW OSCILLATING STRYK MED 9.0X25X0.38MM 2296-003-111

## (undated) DEVICE — EYE NDL RETROBULBAR ATKINSON 25GA 1.5" 581637

## (undated) DEVICE — GLOVE PROTEXIS MICRO 7.0  2D73PM70

## (undated) DEVICE — TOURNIQUET CUFF 30" REPRO BLUE 60-7070-105

## (undated) DEVICE — DRAPE EXTREMITY UPPER 120X76" 29414

## (undated) DEVICE — ESU ELEC NDL 1" COATED/INSULATED E1465

## (undated) DEVICE — BLADE KNIFE SURG 15 371115

## (undated) DEVICE — EYE KNIFE STILETTO VISITEC 1.1MM ANG 45DEG SIDEPORT 376620

## (undated) DEVICE — SU MONOCRYL 4-0 PS-2 18" UND Y496G

## (undated) DEVICE — PACK LOWER EXTREMITY RIVERSIDE SOP32LEFSX

## (undated) DEVICE — BLADE KNIFE SURG 10 371110

## (undated) DEVICE — TAPE MICROPORE 1"X1.5YD 1530S-1

## (undated) DEVICE — IMM LEG ELEVATOR 79-90191

## (undated) DEVICE — DECANTER TRANSFER DEVICE 2008S

## (undated) DEVICE — LINEN TOWEL PACK X6 WHITE 5487

## (undated) DEVICE — DRSG ABDOMINAL 07 1/2X8" 7197D

## (undated) DEVICE — ESU ELEC BLADE 2.75" COATED/INSULATED E1455

## (undated) DEVICE — SOL WATER IRRIG 1000ML BOTTLE 2F7114

## (undated) DEVICE — DRAPE IOBAN INCISE 23X17" 6650EZ

## (undated) DEVICE — INSERT FOGARTY 33MM TRACTION HYDRAJAW HYDRA33

## (undated) DEVICE — SPONGE KITTNER 30-101

## (undated) DEVICE — Device

## (undated) DEVICE — COVER NEOPROBE SOFTFLEX 5X96" W/BANDS 20-PC596

## (undated) DEVICE — BLADE CLIPPER SGL USE 9680

## (undated) DEVICE — SU VICRYL 3-0 SH 27" UND J416H

## (undated) DEVICE — SYR 50ML LL W/O NDL 309653

## (undated) DEVICE — LINEN GOWN XLG 5407

## (undated) DEVICE — BANDAGE 5YDX4IN SLFADH ADH STRL LF

## (undated) DEVICE — CLIP HORIZON SM RED WIDE SLOT 001201

## (undated) DEVICE — SYR 01ML 27GA 0.5" NDL TBC 309623

## (undated) DEVICE — EYE SHIELD PLASTIC

## (undated) DEVICE — SU SILK 5-0 TIE 12X30" A302H

## (undated) DEVICE — GLOVE PROTEXIS BLUE W/NEU-THERA 7.0  2D73EB70

## (undated) DEVICE — MANIFOLD KIT ANGIO AUTOMATED 014613

## (undated) DEVICE — DRAPE TIBURON HAND 29427

## (undated) DEVICE — SURGICEL ABSORBABLE HEMOSTAT SNOW 2"X4" 2082

## (undated) DEVICE — SYR 30ML LL W/O NDL 302832

## (undated) DEVICE — GOWN XLG DISP 9545

## (undated) DEVICE — NDL BLUNT 18GA 1" W/O FILTER 305181

## (undated) DEVICE — DEFIB PRO-PADZ LVP LQD GEL ADULT 8900-2105-01

## (undated) DEVICE — LINEN ORTHO PACK 5446

## (undated) DEVICE — SU PROLENE 5-0 RB-2DA 18" 8713H

## (undated) DEVICE — STRAP KNEE/BODY 31143004

## (undated) DEVICE — SURGICEL ABSORBABLE HEMOSTAT SNOW 4"X4" 2083

## (undated) DEVICE — KIT HAND CONTROL ANGIOTOUCH ACIST 65CM AT-P65

## (undated) DEVICE — EYE KNIFE SLIT XSTAR VISITEC 2.5MM 45DEG BEVEL UP 373725

## (undated) DEVICE — TAPE MICROPORE 2"X1.5YD 1530S-2

## (undated) DEVICE — RAD INTRODUCER KIT MICRO 5FRX10CM .018 NITINOL G/W

## (undated) DEVICE — SYR 10ML LL W/O NDL 302995

## (undated) DEVICE — SU SILK 2-0 SH 30" K833H

## (undated) DEVICE — GLOVE PROTEXIS MICRO 6.5  2D73PM65

## (undated) DEVICE — GLOVE PROTEXIS MICRO 6.0  2D73PM60

## (undated) DEVICE — TOTE ANGIO CORP PC15AT SAN32CC83O

## (undated) DEVICE — LINEN TOWEL PACK X5 5464

## (undated) DEVICE — DECANTER BAG 2002S

## (undated) DEVICE — ESU HOLSTER PLASTIC DISP E2400

## (undated) DEVICE — SYR PISTON URETHRAL 60ML 68000

## (undated) DEVICE — PAD CHUX UNDERPAD 23X24" 7136

## (undated) DEVICE — PACK CATARACT CUSTOM ASC SEY15CPUMC

## (undated) DEVICE — EYE CANN IRR 25GA CYSTOTOME 581610

## (undated) DEVICE — LIGHT HANDLE X1 31140133

## (undated) DEVICE — EYE CANN IRR 27GA ANTERIOR CHAMBER 581280

## (undated) DEVICE — GLOVE PROTEXIS BLUE W/NEU-THERA 8.0  2D73EB80

## (undated) DEVICE — GLOVE PROTEXIS W/NEU-THERA 7.5  2D73TE75

## (undated) DEVICE — DRAPE C-ARM OEC MINI VIEW 6800   00-901917-01

## (undated) DEVICE — DRSG GAUZE 4X8" NON21842

## (undated) RX ORDER — ONDANSETRON 2 MG/ML
INJECTION INTRAMUSCULAR; INTRAVENOUS
Status: DISPENSED
Start: 2021-04-16

## (undated) RX ORDER — FENTANYL CITRATE 50 UG/ML
INJECTION, SOLUTION INTRAMUSCULAR; INTRAVENOUS
Status: DISPENSED
Start: 2018-06-13

## (undated) RX ORDER — EPHEDRINE SULFATE 50 MG/ML
INJECTION, SOLUTION INTRAMUSCULAR; INTRAVENOUS; SUBCUTANEOUS
Status: DISPENSED
Start: 2020-11-16

## (undated) RX ORDER — ONDANSETRON 2 MG/ML
INJECTION INTRAMUSCULAR; INTRAVENOUS
Status: DISPENSED
Start: 2022-02-02

## (undated) RX ORDER — FENTANYL CITRATE 50 UG/ML
INJECTION, SOLUTION INTRAMUSCULAR; INTRAVENOUS
Status: DISPENSED
Start: 2020-11-16

## (undated) RX ORDER — PROPOFOL 10 MG/ML
INJECTION, EMULSION INTRAVENOUS
Status: DISPENSED
Start: 2018-09-26

## (undated) RX ORDER — HEPARIN SODIUM 200 [USP'U]/100ML
INJECTION, SOLUTION INTRAVENOUS
Status: DISPENSED
Start: 2025-03-05

## (undated) RX ORDER — PROPOFOL 10 MG/ML
INJECTION, EMULSION INTRAVENOUS
Status: DISPENSED
Start: 2021-04-16

## (undated) RX ORDER — ACETAMINOPHEN 325 MG/1
TABLET ORAL
Status: DISPENSED
Start: 2020-08-11

## (undated) RX ORDER — LIDOCAINE HYDROCHLORIDE 20 MG/ML
INJECTION, SOLUTION EPIDURAL; INFILTRATION; INTRACAUDAL; PERINEURAL
Status: DISPENSED
Start: 2018-09-26

## (undated) RX ORDER — CITRIC ACID/SODIUM CITRATE 334-500MG
SOLUTION, ORAL ORAL
Status: DISPENSED
Start: 2018-09-26

## (undated) RX ORDER — DEXAMETHASONE SODIUM PHOSPHATE 4 MG/ML
INJECTION, SOLUTION INTRA-ARTICULAR; INTRALESIONAL; INTRAMUSCULAR; INTRAVENOUS; SOFT TISSUE
Status: DISPENSED
Start: 2020-11-16

## (undated) RX ORDER — FENTANYL CITRATE 50 UG/ML
INJECTION, SOLUTION INTRAMUSCULAR; INTRAVENOUS
Status: DISPENSED
Start: 2020-09-25

## (undated) RX ORDER — LIDOCAINE HYDROCHLORIDE 20 MG/ML
INJECTION, SOLUTION EPIDURAL; INFILTRATION; INTRACAUDAL; PERINEURAL
Status: DISPENSED
Start: 2021-04-16

## (undated) RX ORDER — BUPIVACAINE HYDROCHLORIDE 2.5 MG/ML
INJECTION, SOLUTION EPIDURAL; INFILTRATION; INTRACAUDAL
Status: DISPENSED
Start: 2020-11-16

## (undated) RX ORDER — LIDOCAINE HYDROCHLORIDE 10 MG/ML
INJECTION, SOLUTION EPIDURAL; INFILTRATION; INTRACAUDAL; PERINEURAL
Status: DISPENSED
Start: 2022-02-02

## (undated) RX ORDER — CLINDAMYCIN PHOSPHATE 900 MG/50ML
INJECTION, SOLUTION INTRAVENOUS
Status: DISPENSED
Start: 2021-04-16

## (undated) RX ORDER — LIDOCAINE HYDROCHLORIDE 20 MG/ML
INJECTION, SOLUTION EPIDURAL; INFILTRATION; INTRACAUDAL; PERINEURAL
Status: DISPENSED
Start: 2020-11-16

## (undated) RX ORDER — CLINDAMYCIN PHOSPHATE 150 MG/ML
INJECTION, SOLUTION INTRAVENOUS
Status: DISPENSED
Start: 2020-09-25

## (undated) RX ORDER — NEOSTIGMINE METHYLSULFATE 1 MG/ML
VIAL (ML) INJECTION
Status: DISPENSED
Start: 2020-05-07

## (undated) RX ORDER — HEPARIN SODIUM 1000 [USP'U]/ML
INJECTION, SOLUTION INTRAVENOUS; SUBCUTANEOUS
Status: DISPENSED
Start: 2020-11-16

## (undated) RX ORDER — FENTANYL CITRATE 50 UG/ML
INJECTION, SOLUTION INTRAMUSCULAR; INTRAVENOUS
Status: DISPENSED
Start: 2020-05-07

## (undated) RX ORDER — FENTANYL CITRATE 50 UG/ML
INJECTION, SOLUTION INTRAMUSCULAR; INTRAVENOUS
Status: DISPENSED
Start: 2024-04-24

## (undated) RX ORDER — HYDROMORPHONE HYDROCHLORIDE 1 MG/ML
INJECTION, SOLUTION INTRAMUSCULAR; INTRAVENOUS; SUBCUTANEOUS
Status: DISPENSED
Start: 2021-04-16

## (undated) RX ORDER — ONDANSETRON 2 MG/ML
INJECTION INTRAMUSCULAR; INTRAVENOUS
Status: DISPENSED
Start: 2020-05-07

## (undated) RX ORDER — LIDOCAINE HYDROCHLORIDE 10 MG/ML
INJECTION, SOLUTION EPIDURAL; INFILTRATION; INTRACAUDAL; PERINEURAL
Status: DISPENSED
Start: 2020-10-01

## (undated) RX ORDER — ALBUMIN, HUMAN INJ 5% 5 %
SOLUTION INTRAVENOUS
Status: DISPENSED
Start: 2020-05-07

## (undated) RX ORDER — DEXMEDETOMIDINE HYDROCHLORIDE 4 UG/ML
INJECTION, SOLUTION INTRAVENOUS
Status: DISPENSED
Start: 2020-08-11

## (undated) RX ORDER — FENTANYL CITRATE 50 UG/ML
INJECTION, SOLUTION INTRAMUSCULAR; INTRAVENOUS
Status: DISPENSED
Start: 2020-07-13

## (undated) RX ORDER — FENTANYL CITRATE 50 UG/ML
INJECTION, SOLUTION INTRAMUSCULAR; INTRAVENOUS
Status: DISPENSED
Start: 2025-03-05

## (undated) RX ORDER — NALOXONE HYDROCHLORIDE 0.4 MG/ML
INJECTION, SOLUTION INTRAMUSCULAR; INTRAVENOUS; SUBCUTANEOUS
Status: DISPENSED
Start: 2025-04-01

## (undated) RX ORDER — EPHEDRINE SULFATE 50 MG/ML
INJECTION, SOLUTION INTRAMUSCULAR; INTRAVENOUS; SUBCUTANEOUS
Status: DISPENSED
Start: 2022-02-02

## (undated) RX ORDER — CLINDAMYCIN PHOSPHATE 900 MG/50ML
INJECTION, SOLUTION INTRAVENOUS
Status: DISPENSED
Start: 2022-02-02

## (undated) RX ORDER — ESMOLOL HYDROCHLORIDE 10 MG/ML
INJECTION INTRAVENOUS
Status: DISPENSED
Start: 2021-04-16

## (undated) RX ORDER — ESMOLOL HYDROCHLORIDE 10 MG/ML
INJECTION INTRAVENOUS
Status: DISPENSED
Start: 2020-11-16

## (undated) RX ORDER — PROPOFOL 10 MG/ML
INJECTION, EMULSION INTRAVENOUS
Status: DISPENSED
Start: 2022-02-02

## (undated) RX ORDER — LIDOCAINE HYDROCHLORIDE 10 MG/ML
INJECTION, SOLUTION INFILTRATION; PERINEURAL
Status: DISPENSED
Start: 2025-02-17

## (undated) RX ORDER — PROPOFOL 10 MG/ML
INJECTION, EMULSION INTRAVENOUS
Status: DISPENSED
Start: 2020-08-11

## (undated) RX ORDER — LIDOCAINE HYDROCHLORIDE 10 MG/ML
INJECTION, SOLUTION EPIDURAL; INFILTRATION; INTRACAUDAL; PERINEURAL
Status: DISPENSED
Start: 2020-09-25

## (undated) RX ORDER — GLYCOPYRROLATE 0.2 MG/ML
INJECTION, SOLUTION INTRAMUSCULAR; INTRAVENOUS
Status: DISPENSED
Start: 2020-11-16

## (undated) RX ORDER — FENTANYL CITRATE 50 UG/ML
INJECTION, SOLUTION INTRAMUSCULAR; INTRAVENOUS
Status: DISPENSED
Start: 2021-04-16

## (undated) RX ORDER — GLYCOPYRROLATE 0.2 MG/ML
INJECTION, SOLUTION INTRAMUSCULAR; INTRAVENOUS
Status: DISPENSED
Start: 2021-04-16

## (undated) RX ORDER — ACETAMINOPHEN 325 MG/1
TABLET ORAL
Status: DISPENSED
Start: 2020-05-07

## (undated) RX ORDER — LIDOCAINE HYDROCHLORIDE 20 MG/ML
INJECTION, SOLUTION EPIDURAL; INFILTRATION; INTRACAUDAL; PERINEURAL
Status: DISPENSED
Start: 2020-05-07

## (undated) RX ORDER — FENTANYL CITRATE-0.9 % NACL/PF 10 MCG/ML
PLASTIC BAG, INJECTION (ML) INTRAVENOUS
Status: DISPENSED
Start: 2020-11-16

## (undated) RX ORDER — LIDOCAINE HYDROCHLORIDE 20 MG/ML
INJECTION, SOLUTION EPIDURAL; INFILTRATION; INTRACAUDAL; PERINEURAL
Status: DISPENSED
Start: 2022-02-02

## (undated) RX ORDER — SODIUM CHLORIDE 9 MG/ML
INJECTION, SOLUTION INTRAVENOUS
Status: DISPENSED
Start: 2024-04-24

## (undated) RX ORDER — HEPARIN SODIUM 1000 [USP'U]/ML
INJECTION, SOLUTION INTRAVENOUS; SUBCUTANEOUS
Status: DISPENSED
Start: 2024-04-24

## (undated) RX ORDER — DEXAMETHASONE SODIUM PHOSPHATE 4 MG/ML
INJECTION, SOLUTION INTRA-ARTICULAR; INTRALESIONAL; INTRAMUSCULAR; INTRAVENOUS; SOFT TISSUE
Status: DISPENSED
Start: 2021-04-16

## (undated) RX ORDER — LIDOCAINE HYDROCHLORIDE 20 MG/ML
INJECTION, SOLUTION EPIDURAL; INFILTRATION; INTRACAUDAL; PERINEURAL
Status: DISPENSED
Start: 2020-08-11

## (undated) RX ORDER — SODIUM CHLORIDE 9 MG/ML
INJECTION, SOLUTION INTRAVENOUS
Status: DISPENSED
Start: 2020-07-13

## (undated) RX ORDER — HEPARIN SODIUM 1000 [USP'U]/ML
INJECTION, SOLUTION INTRAVENOUS; SUBCUTANEOUS
Status: DISPENSED
Start: 2025-03-05

## (undated) RX ORDER — LIDOCAINE HYDROCHLORIDE 10 MG/ML
INJECTION, SOLUTION EPIDURAL; INFILTRATION; INTRACAUDAL; PERINEURAL
Status: DISPENSED
Start: 2025-03-05

## (undated) RX ORDER — FENTANYL CITRATE 50 UG/ML
INJECTION, SOLUTION INTRAMUSCULAR; INTRAVENOUS
Status: DISPENSED
Start: 2020-10-01

## (undated) RX ORDER — FENTANYL CITRATE 50 UG/ML
INJECTION, SOLUTION INTRAMUSCULAR; INTRAVENOUS
Status: DISPENSED
Start: 2020-08-11

## (undated) RX ORDER — FENTANYL CITRATE-0.9 % NACL/PF 10 MCG/ML
PLASTIC BAG, INJECTION (ML) INTRAVENOUS
Status: DISPENSED
Start: 2021-04-16

## (undated) RX ORDER — HEPARIN SODIUM 1000 [USP'U]/ML
INJECTION, SOLUTION INTRAVENOUS; SUBCUTANEOUS
Status: DISPENSED
Start: 2020-09-25

## (undated) RX ORDER — FENTANYL CITRATE 50 UG/ML
INJECTION, SOLUTION INTRAMUSCULAR; INTRAVENOUS
Status: DISPENSED
Start: 2018-09-26

## (undated) RX ORDER — CEFAZOLIN SODIUM 2 G/100ML
INJECTION, SOLUTION INTRAVENOUS
Status: DISPENSED
Start: 2024-04-24

## (undated) RX ORDER — GLYCOPYRROLATE 0.2 MG/ML
INJECTION, SOLUTION INTRAMUSCULAR; INTRAVENOUS
Status: DISPENSED
Start: 2025-04-01

## (undated) RX ORDER — PROPOFOL 10 MG/ML
INJECTION, EMULSION INTRAVENOUS
Status: DISPENSED
Start: 2020-05-07

## (undated) RX ORDER — LIDOCAINE HYDROCHLORIDE 40 MG/ML
SOLUTION TOPICAL
Status: DISPENSED
Start: 2025-04-01

## (undated) RX ORDER — HEPARIN SODIUM 1000 [USP'U]/ML
INJECTION, SOLUTION INTRAVENOUS; SUBCUTANEOUS
Status: DISPENSED
Start: 2020-05-07

## (undated) RX ORDER — REGADENOSON 0.08 MG/ML
INJECTION, SOLUTION INTRAVENOUS
Status: DISPENSED
Start: 2018-05-22

## (undated) RX ORDER — BUPIVACAINE HYDROCHLORIDE 2.5 MG/ML
INJECTION, SOLUTION EPIDURAL; INFILTRATION; INTRACAUDAL
Status: DISPENSED
Start: 2022-02-02

## (undated) RX ORDER — SODIUM CHLORIDE 9 MG/ML
INJECTION, SOLUTION INTRAVENOUS
Status: DISPENSED
Start: 2020-09-25

## (undated) RX ORDER — LIDOCAINE HYDROCHLORIDE 10 MG/ML
INJECTION, SOLUTION EPIDURAL; INFILTRATION; INTRACAUDAL; PERINEURAL
Status: DISPENSED
Start: 2020-07-13

## (undated) RX ORDER — PROPOFOL 10 MG/ML
INJECTION, EMULSION INTRAVENOUS
Status: DISPENSED
Start: 2020-11-16

## (undated) RX ORDER — ONDANSETRON 2 MG/ML
INJECTION INTRAMUSCULAR; INTRAVENOUS
Status: DISPENSED
Start: 2020-11-16

## (undated) RX ORDER — PHENYLEPHRINE HCL IN 0.9% NACL 1 MG/10 ML
SYRINGE (ML) INTRAVENOUS
Status: DISPENSED
Start: 2018-09-26

## (undated) RX ORDER — LIDOCAINE HYDROCHLORIDE 10 MG/ML
INJECTION, SOLUTION INFILTRATION; PERINEURAL
Status: DISPENSED
Start: 2018-06-14

## (undated) RX ORDER — DIPHENHYDRAMINE HYDROCHLORIDE 50 MG/ML
INJECTION INTRAMUSCULAR; INTRAVENOUS
Status: DISPENSED
Start: 2018-06-13

## (undated) RX ORDER — PAPAVERINE HYDROCHLORIDE 30 MG/ML
INJECTION INTRAMUSCULAR; INTRAVENOUS
Status: DISPENSED
Start: 2021-04-16

## (undated) RX ORDER — ACETAMINOPHEN 325 MG/1
TABLET ORAL
Status: DISPENSED
Start: 2018-09-26

## (undated) RX ORDER — FENTANYL CITRATE 50 UG/ML
INJECTION, SOLUTION INTRAMUSCULAR; INTRAVENOUS
Status: DISPENSED
Start: 2025-03-30

## (undated) RX ORDER — FLUMAZENIL 0.1 MG/ML
INJECTION, SOLUTION INTRAVENOUS
Status: DISPENSED
Start: 2025-04-01

## (undated) RX ORDER — LIDOCAINE HYDROCHLORIDE 10 MG/ML
INJECTION, SOLUTION EPIDURAL; INFILTRATION; INTRACAUDAL; PERINEURAL
Status: DISPENSED
Start: 2018-06-26

## (undated) RX ORDER — GLYCOPYRROLATE 0.2 MG/ML
INJECTION, SOLUTION INTRAMUSCULAR; INTRAVENOUS
Status: DISPENSED
Start: 2020-05-07

## (undated) RX ORDER — PAPAVERINE HYDROCHLORIDE 30 MG/ML
INJECTION INTRAMUSCULAR; INTRAVENOUS
Status: DISPENSED
Start: 2020-11-16

## (undated) RX ORDER — PAPAVERINE HYDROCHLORIDE 30 MG/ML
INJECTION INTRAMUSCULAR; INTRAVENOUS
Status: DISPENSED
Start: 2020-05-07

## (undated) RX ORDER — FENTANYL CITRATE-0.9 % NACL/PF 10 MCG/ML
PLASTIC BAG, INJECTION (ML) INTRAVENOUS
Status: DISPENSED
Start: 2022-02-02

## (undated) RX ORDER — ACETAMINOPHEN 325 MG/1
TABLET ORAL
Status: DISPENSED
Start: 2020-11-16

## (undated) RX ORDER — EPHEDRINE SULFATE 50 MG/ML
INJECTION, SOLUTION INTRAMUSCULAR; INTRAVENOUS; SUBCUTANEOUS
Status: DISPENSED
Start: 2020-05-07

## (undated) RX ORDER — HEPARIN SODIUM 1000 [USP'U]/ML
INJECTION, SOLUTION INTRAVENOUS; SUBCUTANEOUS
Status: DISPENSED
Start: 2021-04-16

## (undated) RX ORDER — HEPARIN SODIUM 1000 [USP'U]/ML
INJECTION, SOLUTION INTRAVENOUS; SUBCUTANEOUS
Status: DISPENSED
Start: 2022-02-02

## (undated) RX ORDER — SODIUM CHLORIDE, SODIUM LACTATE, POTASSIUM CHLORIDE, CALCIUM CHLORIDE 600; 310; 30; 20 MG/100ML; MG/100ML; MG/100ML; MG/100ML
INJECTION, SOLUTION INTRAVENOUS
Status: DISPENSED
Start: 2021-04-16

## (undated) RX ORDER — LIDOCAINE HYDROCHLORIDE 10 MG/ML
INJECTION, SOLUTION EPIDURAL; INFILTRATION; INTRACAUDAL; PERINEURAL
Status: DISPENSED
Start: 2024-04-24

## (undated) RX ORDER — VERAPAMIL HYDROCHLORIDE 2.5 MG/ML
INJECTION, SOLUTION INTRAVENOUS
Status: DISPENSED
Start: 2021-04-16

## (undated) RX ORDER — FENTANYL CITRATE 50 UG/ML
INJECTION, SOLUTION INTRAMUSCULAR; INTRAVENOUS
Status: DISPENSED
Start: 2022-02-02

## (undated) RX ORDER — CLINDAMYCIN PHOSPHATE 150 MG/ML
INJECTION, SOLUTION INTRAVENOUS
Status: DISPENSED
Start: 2020-10-01

## (undated) RX ORDER — PHENYLEPHRINE HCL IN 0.9% NACL 1 MG/10 ML
SYRINGE (ML) INTRAVENOUS
Status: DISPENSED
Start: 2020-05-07

## (undated) RX ORDER — BUPIVACAINE HYDROCHLORIDE 5 MG/ML
INJECTION, SOLUTION EPIDURAL; INTRACAUDAL
Status: DISPENSED
Start: 2018-09-26

## (undated) RX ORDER — FENTANYL CITRATE 50 UG/ML
INJECTION, SOLUTION INTRAMUSCULAR; INTRAVENOUS
Status: DISPENSED
Start: 2025-04-01

## (undated) RX ORDER — ONDANSETRON 2 MG/ML
INJECTION INTRAMUSCULAR; INTRAVENOUS
Status: DISPENSED
Start: 2018-09-26

## (undated) RX ORDER — CLINDAMYCIN PHOSPHATE 900 MG/50ML
INJECTION, SOLUTION INTRAVENOUS
Status: DISPENSED
Start: 2018-09-26

## (undated) RX ORDER — HEPARIN SODIUM 1000 [USP'U]/ML
INJECTION, SOLUTION INTRAVENOUS; SUBCUTANEOUS
Status: DISPENSED
Start: 2020-10-01

## (undated) RX ORDER — EPHEDRINE SULFATE 50 MG/ML
INJECTION, SOLUTION INTRAMUSCULAR; INTRAVENOUS; SUBCUTANEOUS
Status: DISPENSED
Start: 2018-09-26